# Patient Record
Sex: MALE | Race: BLACK OR AFRICAN AMERICAN | NOT HISPANIC OR LATINO | URBAN - METROPOLITAN AREA
[De-identification: names, ages, dates, MRNs, and addresses within clinical notes are randomized per-mention and may not be internally consistent; named-entity substitution may affect disease eponyms.]

---

## 2017-01-04 ENCOUNTER — EMERGENCY (EMERGENCY)
Facility: HOSPITAL | Age: 77
LOS: 1 days | Discharge: HOME | End: 2017-01-04
Attending: EMERGENCY MEDICINE | Admitting: STUDENT IN AN ORGANIZED HEALTH CARE EDUCATION/TRAINING PROGRAM
Payer: COMMERCIAL

## 2017-01-04 VITALS
HEART RATE: 93 BPM | SYSTOLIC BLOOD PRESSURE: 101 MMHG | DIASTOLIC BLOOD PRESSURE: 66 MMHG | OXYGEN SATURATION: 97 % | RESPIRATION RATE: 16 BRPM

## 2017-01-04 VITALS
RESPIRATION RATE: 14 BRPM | WEIGHT: 203.93 LBS | TEMPERATURE: 97 F | SYSTOLIC BLOOD PRESSURE: 108 MMHG | OXYGEN SATURATION: 99 % | DIASTOLIC BLOOD PRESSURE: 74 MMHG | HEART RATE: 102 BPM

## 2017-01-04 DIAGNOSIS — J44.9 CHRONIC OBSTRUCTIVE PULMONARY DISEASE, UNSPECIFIED: ICD-10-CM

## 2017-01-04 DIAGNOSIS — E11.9 TYPE 2 DIABETES MELLITUS WITHOUT COMPLICATIONS: ICD-10-CM

## 2017-01-04 DIAGNOSIS — R53.1 WEAKNESS: ICD-10-CM

## 2017-01-04 DIAGNOSIS — I73.9 PERIPHERAL VASCULAR DISEASE, UNSPECIFIED: ICD-10-CM

## 2017-01-04 DIAGNOSIS — Z87.891 PERSONAL HISTORY OF NICOTINE DEPENDENCE: ICD-10-CM

## 2017-01-04 DIAGNOSIS — I10 ESSENTIAL (PRIMARY) HYPERTENSION: ICD-10-CM

## 2017-01-04 DIAGNOSIS — Z79.84 LONG TERM (CURRENT) USE OF ORAL HYPOGLYCEMIC DRUGS: ICD-10-CM

## 2017-01-04 DIAGNOSIS — Z95.1 PRESENCE OF AORTOCORONARY BYPASS GRAFT: ICD-10-CM

## 2017-01-04 DIAGNOSIS — R05 COUGH: ICD-10-CM

## 2017-01-04 DIAGNOSIS — E78.5 HYPERLIPIDEMIA, UNSPECIFIED: ICD-10-CM

## 2017-01-04 DIAGNOSIS — I25.10 ATHEROSCLEROTIC HEART DISEASE OF NATIVE CORONARY ARTERY WITHOUT ANGINA PECTORIS: ICD-10-CM

## 2017-01-04 LAB
ALBUMIN SERPL ELPH-MCNC: 3.1 G/DL — LOW (ref 3.3–5)
ALP SERPL-CCNC: 95 U/L — SIGNIFICANT CHANGE UP (ref 40–120)
ALT FLD-CCNC: 22 U/L — SIGNIFICANT CHANGE UP (ref 12–78)
ANION GAP SERPL CALC-SCNC: 11 MMOL/L — SIGNIFICANT CHANGE UP (ref 5–17)
AST SERPL-CCNC: 12 U/L — LOW (ref 15–37)
BASOPHILS # BLD AUTO: 0.1 K/UL — SIGNIFICANT CHANGE UP (ref 0–0.2)
BASOPHILS NFR BLD AUTO: 1.5 % — SIGNIFICANT CHANGE UP (ref 0–2)
BILIRUB SERPL-MCNC: 0.4 MG/DL — SIGNIFICANT CHANGE UP (ref 0.2–1.2)
BUN SERPL-MCNC: 17 MG/DL — SIGNIFICANT CHANGE UP (ref 7–23)
CALCIUM SERPL-MCNC: 10 MG/DL — SIGNIFICANT CHANGE UP (ref 8.5–10.1)
CHLORIDE SERPL-SCNC: 97 MMOL/L — SIGNIFICANT CHANGE UP (ref 96–108)
CO2 SERPL-SCNC: 29 MMOL/L — SIGNIFICANT CHANGE UP (ref 22–31)
CREAT SERPL-MCNC: 1.4 MG/DL — HIGH (ref 0.5–1.3)
EOSINOPHIL # BLD AUTO: 0.2 K/UL — SIGNIFICANT CHANGE UP (ref 0–0.5)
EOSINOPHIL NFR BLD AUTO: 2.3 % — SIGNIFICANT CHANGE UP (ref 0–6)
GLUCOSE SERPL-MCNC: 360 MG/DL — HIGH (ref 70–99)
HCT VFR BLD CALC: 43.1 % — SIGNIFICANT CHANGE UP (ref 39–50)
HGB BLD-MCNC: 13.7 G/DL — SIGNIFICANT CHANGE UP (ref 13–17)
LYMPHOCYTES # BLD AUTO: 1.6 K/UL — SIGNIFICANT CHANGE UP (ref 1–3.3)
LYMPHOCYTES # BLD AUTO: 25.2 % — SIGNIFICANT CHANGE UP (ref 13–44)
MCHC RBC-ENTMCNC: 27.1 PG — SIGNIFICANT CHANGE UP (ref 27–34)
MCHC RBC-ENTMCNC: 31.8 GM/DL — LOW (ref 32–36)
MCV RBC AUTO: 85.2 FL — SIGNIFICANT CHANGE UP (ref 80–100)
MONOCYTES # BLD AUTO: 0.6 K/UL — SIGNIFICANT CHANGE UP (ref 0–0.9)
MONOCYTES NFR BLD AUTO: 9.3 % — HIGH (ref 1–9)
NEUTROPHILS # BLD AUTO: 4 K/UL — SIGNIFICANT CHANGE UP (ref 1.8–7.4)
NEUTROPHILS NFR BLD AUTO: 61.7 % — SIGNIFICANT CHANGE UP (ref 43–77)
PLATELET # BLD AUTO: 382 K/UL — SIGNIFICANT CHANGE UP (ref 150–400)
POTASSIUM SERPL-MCNC: 4.3 MMOL/L — SIGNIFICANT CHANGE UP (ref 3.5–5.3)
POTASSIUM SERPL-SCNC: 4.3 MMOL/L — SIGNIFICANT CHANGE UP (ref 3.5–5.3)
PROT SERPL-MCNC: 7.5 G/DL — SIGNIFICANT CHANGE UP (ref 6–8.3)
RBC # BLD: 5.06 M/UL — SIGNIFICANT CHANGE UP (ref 4.2–5.8)
RBC # FLD: 13.1 % — SIGNIFICANT CHANGE UP (ref 10.3–14.5)
SODIUM SERPL-SCNC: 137 MMOL/L — SIGNIFICANT CHANGE UP (ref 135–145)
WBC # BLD: 6.5 K/UL — SIGNIFICANT CHANGE UP (ref 3.8–10.5)
WBC # FLD AUTO: 6.5 K/UL — SIGNIFICANT CHANGE UP (ref 3.8–10.5)

## 2017-01-04 PROCEDURE — 93005 ELECTROCARDIOGRAM TRACING: CPT

## 2017-01-04 PROCEDURE — 99283 EMERGENCY DEPT VISIT LOW MDM: CPT | Mod: 25

## 2017-01-04 PROCEDURE — 71045 X-RAY EXAM CHEST 1 VIEW: CPT

## 2017-01-04 PROCEDURE — 71010: CPT | Mod: 26

## 2017-01-04 PROCEDURE — 80053 COMPREHEN METABOLIC PANEL: CPT

## 2017-01-04 PROCEDURE — 99285 EMERGENCY DEPT VISIT HI MDM: CPT

## 2017-01-04 PROCEDURE — 85027 COMPLETE CBC AUTOMATED: CPT

## 2017-01-04 RX ORDER — SODIUM CHLORIDE 9 MG/ML
1000 INJECTION INTRAMUSCULAR; INTRAVENOUS; SUBCUTANEOUS ONCE
Qty: 0 | Refills: 0 | Status: COMPLETED | OUTPATIENT
Start: 2017-01-04 | End: 2017-01-04

## 2017-01-04 RX ADMIN — SODIUM CHLORIDE 1000 MILLILITER(S): 9 INJECTION INTRAMUSCULAR; INTRAVENOUS; SUBCUTANEOUS at 14:53

## 2017-01-04 NOTE — ED PROVIDER NOTE - OBJECTIVE STATEMENT
pt c/o generalized weakness/fatigue. pt had recent pna, d/c from hospital 12/27. pt reports was coughing x 3 weeks. no fevers, chills, ha, d/n/v, cp, sob, abd pain.  pmd - hip

## 2017-01-04 NOTE — ED PROVIDER NOTE - PROGRESS NOTE DETAILS
CXR d/w radiologist, he relates infiltrate seen on CT chest 12/27. Reevaluated patient at bedside.  Patient feeling much improved, ambulating in ER without symptoms. Pt offered admission, but declining, has 1 more day abx, wants to be d/c to f/u as outpt.  Discussed the results of all diagnostic testing in ED and copies of all reports given.   An opportunity to ask questions was given.  Discussed the importance of prompt, close medical follow-up.  Patient will return with any changes, concerns or persistent / worsening symptoms.  Understanding of all instructions verbalized.

## 2017-01-04 NOTE — ED PROVIDER NOTE - PMH
Asymptomatic Peripheral Vascular Disease    CAD (Coronary Artery Disease)    COPD (chronic obstructive pulmonary disease)    Diabetes Mellitus, Type II    Dyslipidemia    Hypertension    Osteomyelitis

## 2017-01-04 NOTE — ED PROVIDER NOTE - CONDUCTED A DETAILED DISCUSSION WITH PATIENT AND/OR GUARDIAN REGARDING, MDM
return to ED if symptoms worsen, persist or questions arise/need for outpatient follow-up/radiology results/lab results

## 2017-01-04 NOTE — ED ADULT NURSE NOTE - OBJECTIVE STATEMENT
Pt to ED sent by PMD for hypotension, pt states being sick with cough/cough x 3weeks, afebrile on arrival, skin intact, EKG performed, lab tests sent will continue to monitor.

## 2017-07-06 ENCOUNTER — INPATIENT (INPATIENT)
Facility: HOSPITAL | Age: 77
LOS: 3 days | Discharge: HOME | DRG: 189 | End: 2017-07-10
Attending: INTERNAL MEDICINE | Admitting: INTERNAL MEDICINE
Payer: COMMERCIAL

## 2017-07-06 VITALS
OXYGEN SATURATION: 100 % | SYSTOLIC BLOOD PRESSURE: 143 MMHG | HEART RATE: 120 BPM | RESPIRATION RATE: 36 BRPM | DIASTOLIC BLOOD PRESSURE: 91 MMHG | TEMPERATURE: 98 F

## 2017-07-06 DIAGNOSIS — J96.91 RESPIRATORY FAILURE, UNSPECIFIED WITH HYPOXIA: ICD-10-CM

## 2017-07-06 LAB
ALBUMIN SERPL ELPH-MCNC: 3.8 G/DL — SIGNIFICANT CHANGE UP (ref 3.3–5)
ALP SERPL-CCNC: 98 U/L — SIGNIFICANT CHANGE UP (ref 40–120)
ALT FLD-CCNC: 29 U/L — SIGNIFICANT CHANGE UP (ref 12–78)
ANION GAP SERPL CALC-SCNC: 8 MMOL/L — SIGNIFICANT CHANGE UP (ref 5–17)
AST SERPL-CCNC: 14 U/L — LOW (ref 15–37)
BASOPHILS # BLD AUTO: 0 K/UL — SIGNIFICANT CHANGE UP (ref 0–0.2)
BASOPHILS NFR BLD AUTO: 0.4 % — SIGNIFICANT CHANGE UP (ref 0–2)
BILIRUB SERPL-MCNC: 0.3 MG/DL — SIGNIFICANT CHANGE UP (ref 0.2–1.2)
BUN SERPL-MCNC: 28 MG/DL — HIGH (ref 7–23)
CALCIUM SERPL-MCNC: 9.5 MG/DL — SIGNIFICANT CHANGE UP (ref 8.5–10.1)
CHLORIDE SERPL-SCNC: 93 MMOL/L — LOW (ref 96–108)
CK MB BLD-MCNC: 6.8 % — HIGH (ref 0–3.5)
CK MB CFR SERPL CALC: 10.7 NG/ML — HIGH (ref 0–3.6)
CK SERPL-CCNC: 158 U/L — SIGNIFICANT CHANGE UP (ref 26–308)
CO2 SERPL-SCNC: 32 MMOL/L — HIGH (ref 22–31)
CREAT SERPL-MCNC: 1.6 MG/DL — HIGH (ref 0.5–1.3)
EOSINOPHIL # BLD AUTO: 0 K/UL — SIGNIFICANT CHANGE UP (ref 0–0.5)
EOSINOPHIL NFR BLD AUTO: 0.1 % — SIGNIFICANT CHANGE UP (ref 0–6)
GLUCOSE SERPL-MCNC: 372 MG/DL — HIGH (ref 70–99)
HCT VFR BLD CALC: 44.6 % — SIGNIFICANT CHANGE UP (ref 39–50)
HGB BLD-MCNC: 14.3 G/DL — SIGNIFICANT CHANGE UP (ref 13–17)
LACTATE SERPL-SCNC: 2.4 MMOL/L — HIGH (ref 0.7–2)
LYMPHOCYTES # BLD AUTO: 0.5 K/UL — LOW (ref 1–3.3)
LYMPHOCYTES # BLD AUTO: 5.4 % — LOW (ref 13–44)
MCHC RBC-ENTMCNC: 28.4 PG — SIGNIFICANT CHANGE UP (ref 27–34)
MCHC RBC-ENTMCNC: 32 GM/DL — SIGNIFICANT CHANGE UP (ref 32–36)
MCV RBC AUTO: 88.8 FL — SIGNIFICANT CHANGE UP (ref 80–100)
MONOCYTES # BLD AUTO: 0.1 K/UL — SIGNIFICANT CHANGE UP (ref 0–0.9)
MONOCYTES NFR BLD AUTO: 0.9 % — LOW (ref 1–9)
NEUTROPHILS # BLD AUTO: 8.1 K/UL — HIGH (ref 1.8–7.4)
NEUTROPHILS NFR BLD AUTO: 93.2 % — HIGH (ref 43–77)
NT-PROBNP SERPL-SCNC: 168 PG/ML — SIGNIFICANT CHANGE UP (ref 0–450)
PLATELET # BLD AUTO: 264 K/UL — SIGNIFICANT CHANGE UP (ref 150–400)
POTASSIUM SERPL-MCNC: 5.2 MMOL/L — SIGNIFICANT CHANGE UP (ref 3.5–5.3)
POTASSIUM SERPL-SCNC: 5.2 MMOL/L — SIGNIFICANT CHANGE UP (ref 3.5–5.3)
PROT SERPL-MCNC: 7.3 G/DL — SIGNIFICANT CHANGE UP (ref 6–8.3)
RBC # BLD: 5.03 M/UL — SIGNIFICANT CHANGE UP (ref 4.2–5.8)
RBC # FLD: 13.6 % — SIGNIFICANT CHANGE UP (ref 10.3–14.5)
SODIUM SERPL-SCNC: 133 MMOL/L — LOW (ref 135–145)
TROPONIN I SERPL-MCNC: <.015 NG/ML — SIGNIFICANT CHANGE UP (ref 0.01–0.04)
WBC # BLD: 8.7 K/UL — SIGNIFICANT CHANGE UP (ref 3.8–10.5)
WBC # FLD AUTO: 8.7 K/UL — SIGNIFICANT CHANGE UP (ref 3.8–10.5)

## 2017-07-06 PROCEDURE — 93010 ELECTROCARDIOGRAM REPORT: CPT

## 2017-07-06 PROCEDURE — 99223 1ST HOSP IP/OBS HIGH 75: CPT | Mod: AI

## 2017-07-06 PROCEDURE — 71010: CPT | Mod: 26

## 2017-07-06 PROCEDURE — 99291 CRITICAL CARE FIRST HOUR: CPT

## 2017-07-06 PROCEDURE — 99285 EMERGENCY DEPT VISIT HI MDM: CPT

## 2017-07-06 RX ORDER — CLOPIDOGREL BISULFATE 75 MG/1
75 TABLET, FILM COATED ORAL DAILY
Qty: 0 | Refills: 0 | Status: DISCONTINUED | OUTPATIENT
Start: 2017-07-06 | End: 2017-07-10

## 2017-07-06 RX ORDER — INSULIN LISPRO 100/ML
6 VIAL (ML) SUBCUTANEOUS ONCE
Qty: 0 | Refills: 0 | Status: COMPLETED | OUTPATIENT
Start: 2017-07-06 | End: 2017-07-06

## 2017-07-06 RX ORDER — ATORVASTATIN CALCIUM 80 MG/1
40 TABLET, FILM COATED ORAL AT BEDTIME
Qty: 0 | Refills: 0 | Status: DISCONTINUED | OUTPATIENT
Start: 2017-07-06 | End: 2017-07-10

## 2017-07-06 RX ORDER — FUROSEMIDE 40 MG
40 TABLET ORAL ONCE
Qty: 0 | Refills: 0 | Status: DISCONTINUED | OUTPATIENT
Start: 2017-07-06 | End: 2017-07-06

## 2017-07-06 RX ORDER — DEXTROSE 50 % IN WATER 50 %
1 SYRINGE (ML) INTRAVENOUS ONCE
Qty: 0 | Refills: 0 | Status: DISCONTINUED | OUTPATIENT
Start: 2017-07-06 | End: 2017-07-10

## 2017-07-06 RX ORDER — INSULIN GLARGINE 100 [IU]/ML
12 INJECTION, SOLUTION SUBCUTANEOUS AT BEDTIME
Qty: 0 | Refills: 0 | Status: DISCONTINUED | OUTPATIENT
Start: 2017-07-06 | End: 2017-07-10

## 2017-07-06 RX ORDER — SODIUM CHLORIDE 9 MG/ML
1000 INJECTION, SOLUTION INTRAVENOUS
Qty: 0 | Refills: 0 | Status: DISCONTINUED | OUTPATIENT
Start: 2017-07-06 | End: 2017-07-10

## 2017-07-06 RX ORDER — INSULIN LISPRO 100/ML
VIAL (ML) SUBCUTANEOUS
Qty: 0 | Refills: 0 | Status: DISCONTINUED | OUTPATIENT
Start: 2017-07-06 | End: 2017-07-10

## 2017-07-06 RX ORDER — IPRATROPIUM/ALBUTEROL SULFATE 18-103MCG
3 AEROSOL WITH ADAPTER (GRAM) INHALATION ONCE
Qty: 0 | Refills: 0 | Status: COMPLETED | OUTPATIENT
Start: 2017-07-06 | End: 2017-07-06

## 2017-07-06 RX ORDER — BUDESONIDE AND FORMOTEROL FUMARATE DIHYDRATE 160; 4.5 UG/1; UG/1
2 AEROSOL RESPIRATORY (INHALATION)
Qty: 0 | Refills: 0 | Status: DISCONTINUED | OUTPATIENT
Start: 2017-07-06 | End: 2017-07-10

## 2017-07-06 RX ORDER — CEFTRIAXONE 500 MG/1
1 INJECTION, POWDER, FOR SOLUTION INTRAMUSCULAR; INTRAVENOUS EVERY 24 HOURS
Qty: 0 | Refills: 0 | Status: DISCONTINUED | OUTPATIENT
Start: 2017-07-06 | End: 2017-07-09

## 2017-07-06 RX ORDER — IPRATROPIUM/ALBUTEROL SULFATE 18-103MCG
3 AEROSOL WITH ADAPTER (GRAM) INHALATION EVERY 6 HOURS
Qty: 0 | Refills: 0 | Status: DISCONTINUED | OUTPATIENT
Start: 2017-07-06 | End: 2017-07-08

## 2017-07-06 RX ORDER — DEXTROSE 50 % IN WATER 50 %
25 SYRINGE (ML) INTRAVENOUS ONCE
Qty: 0 | Refills: 0 | Status: DISCONTINUED | OUTPATIENT
Start: 2017-07-06 | End: 2017-07-10

## 2017-07-06 RX ORDER — VALSARTAN 80 MG/1
160 TABLET ORAL DAILY
Qty: 0 | Refills: 0 | Status: DISCONTINUED | OUTPATIENT
Start: 2017-07-06 | End: 2017-07-10

## 2017-07-06 RX ORDER — DEXTROSE 50 % IN WATER 50 %
12.5 SYRINGE (ML) INTRAVENOUS ONCE
Qty: 0 | Refills: 0 | Status: DISCONTINUED | OUTPATIENT
Start: 2017-07-06 | End: 2017-07-10

## 2017-07-06 RX ORDER — INSULIN LISPRO 100/ML
4 VIAL (ML) SUBCUTANEOUS
Qty: 0 | Refills: 0 | Status: DISCONTINUED | OUTPATIENT
Start: 2017-07-06 | End: 2017-07-07

## 2017-07-06 RX ORDER — GLUCAGON INJECTION, SOLUTION 0.5 MG/.1ML
1 INJECTION, SOLUTION SUBCUTANEOUS ONCE
Qty: 0 | Refills: 0 | Status: DISCONTINUED | OUTPATIENT
Start: 2017-07-06 | End: 2017-07-10

## 2017-07-06 RX ORDER — MAGNESIUM SULFATE 500 MG/ML
2 VIAL (ML) INJECTION ONCE
Qty: 0 | Refills: 0 | Status: COMPLETED | OUTPATIENT
Start: 2017-07-06 | End: 2017-07-06

## 2017-07-06 RX ORDER — ENOXAPARIN SODIUM 100 MG/ML
40 INJECTION SUBCUTANEOUS DAILY
Qty: 0 | Refills: 0 | Status: DISCONTINUED | OUTPATIENT
Start: 2017-07-06 | End: 2017-07-10

## 2017-07-06 RX ORDER — AZITHROMYCIN 500 MG/1
500 TABLET, FILM COATED ORAL EVERY 24 HOURS
Qty: 0 | Refills: 0 | Status: DISCONTINUED | OUTPATIENT
Start: 2017-07-06 | End: 2017-07-10

## 2017-07-06 RX ORDER — TIOTROPIUM BROMIDE 18 UG/1
1 CAPSULE ORAL; RESPIRATORY (INHALATION) DAILY
Qty: 0 | Refills: 0 | Status: DISCONTINUED | OUTPATIENT
Start: 2017-07-06 | End: 2017-07-10

## 2017-07-06 RX ORDER — TAMSULOSIN HYDROCHLORIDE 0.4 MG/1
0.4 CAPSULE ORAL AT BEDTIME
Qty: 0 | Refills: 0 | Status: DISCONTINUED | OUTPATIENT
Start: 2017-07-06 | End: 2017-07-10

## 2017-07-06 RX ADMIN — INSULIN GLARGINE 12 UNIT(S): 100 INJECTION, SOLUTION SUBCUTANEOUS at 21:24

## 2017-07-06 RX ADMIN — Medication 50 GRAM(S): at 08:17

## 2017-07-06 RX ADMIN — ATORVASTATIN CALCIUM 40 MILLIGRAM(S): 80 TABLET, FILM COATED ORAL at 21:24

## 2017-07-06 RX ADMIN — ENOXAPARIN SODIUM 40 MILLIGRAM(S): 100 INJECTION SUBCUTANEOUS at 21:24

## 2017-07-06 RX ADMIN — Medication 40 MILLIGRAM(S): at 18:00

## 2017-07-06 RX ADMIN — Medication 3 MILLILITER(S): at 08:18

## 2017-07-06 RX ADMIN — BUDESONIDE AND FORMOTEROL FUMARATE DIHYDRATE 2 PUFF(S): 160; 4.5 AEROSOL RESPIRATORY (INHALATION) at 17:24

## 2017-07-06 RX ADMIN — Medication 3 MILLILITER(S): at 20:26

## 2017-07-06 RX ADMIN — TAMSULOSIN HYDROCHLORIDE 0.4 MILLIGRAM(S): 0.4 CAPSULE ORAL at 21:24

## 2017-07-06 RX ADMIN — Medication 125 MILLIGRAM(S): at 08:17

## 2017-07-06 RX ADMIN — Medication 6 UNIT(S): at 21:32

## 2017-07-06 NOTE — ED PROVIDER NOTE - OBJECTIVE STATEMENT
pt w COPD, progressive SOB over the last two weeks while he was being tapered off of prednisone.  now moderate to severe and not responsive to home nebulizer w albuterol.  endorses dry cough, no fevers, no chest pain, no abd pain or n/v/d.

## 2017-07-06 NOTE — CONSULT NOTE ADULT - SUBJECTIVE AND OBJECTIVE BOX
CC: 77M presents with progressively worsening dyspnea and cough    HPI:  77M PMH COPD (previously on Symbicort), HTN, HLD, DM2 (not on insulin), CAD s/p 3V-CABG (2004), PVD s/p R LE stent on plavix, BPH, and left femur fracture with OM s/p multiple surgeries who presents with 1 month of progressively worsening dyspnea on exertion. He reports exercise tolerance of less than 1 block, 2-pillow orthopnea, waking up frequently at night due to dyspnea. He denies any leg edema or chest pain. No fevers or chills, but reports worsening cough without production. He saw a PMD who prescribed him a steroid taper that he completed 2 days ago. He has also been taking albuterol nebs q6h for 1 month without significant improvement.     In the ED, he was afebrile and hemodynamically stable, but lethargic and found on ABG to have acute on chronic hypercapnic respiratory failure. He was placed on BiPAP 15/5 and first repeat ABG showed no improvement in hypercapnia. He was subsequently admitted to ICU. He received Solumedrol and duonebs with improvement in dyspnea.    Allergies: No Known Allergies    PAST MEDICAL & SURGICAL HISTORY:  COPD (previously on Symbicort)  Hypertension  Hyperlipidemia  Diabetes mellitus type 2 (not on insulin)  Coronary artery disease  S/P 3V-CABG (2004)  Left femur fracture and osteomyelitis s/p multiple orthopedic surgeries  Benign prostatic hyperplasia  Peripheral vascular disease s/p Right leg stent    FAMILY HISTORY:  Family history of diabetes mellitus    SOCIAL HISTORY:  quit smoking 30 years ago, used to smoke 1 ppd for 35 years. Smokes about 3-4 joints marijuana 3x/week. No alcohol use. No other drug use    Home Medications: Clopidogrel 75 mg qd, Valsartan/HCTZ 160/12.5 qd, Metformin 1000 mg bid, Glipizide 2.5 mg qd, Tamsulosin 0.4 mg qhs, Atorvastatin 40 mg qhs, Albuterol nebs    Review of Systems:  Constitutional: +fatigue, lethargy, no fevers or chills  Neuro: no headache, numbness, weakness  Resp: +dyspnea, +cough  CVS: no chest pain, palpitations, leg swelling  GI: no abdominal pain, nausea, vomiting, diarrhea   : no dysuria, frequency, incontinence  Skin: no itching, burning, rashes, or lesions   Msk: no joint pain or swelling  Psych: no depression, anxiety, mood swings    T(F): 98.5 (07-06-17 @ 07:14), Max: 98.5 (07-06-17 @ 07:14)  HR: 92 (07-06-17 @ 15:30) (90 - 120)  BP: 99/53 (07-06-17 @ 09:44) (99/53 - 143/91)  RR: 16 (07-06-17 @ 09:44) (16 - 36)  SpO2: 98% (07-06-17 @ 15:30)    Physical Exam:     Gen: NAD; AAOx3, lethargic in speech  Neuro: CN II-XII grossly intact; moving all extremities  HEENT: NC/AT; EOMI; injected sclera, MMM; no JVD  CVS: normal S1 & S2; regular rate and rhythm   Resp: decreased air entry bilaterally; faint end-expiratory wheezing  Abd: soft; NT/ND   Ext: no edema; no cyanosis or clubbing  Skin: WWP                        15.3   10.4  )-----------( 270      ( 06 Jul 2017 08:13 )             47.1     137  |  98  |  24  ----------------------------<  252  4.6   |  33  |  1.40    Ca    9.8      06 Jul 2017 08:14    TPro  7.3  /  Alb  3.9  /  TBili  0.3  /  DBili  x   /  AST  19  /  ALT  30  /  AlkPhos  106  07-06    Lactate 1.0           07-06 @ 08:14      CARDIAC MARKERS ( 06 Jul 2017 08:14 )  <.015 ng/mL / x     / 181 U/L / x     / 11.1 ng/mL    CXR: hyperinflated lungs with flattened hemidiaphragm. Stable left basilar opacity    CODE STATUS: full

## 2017-07-06 NOTE — DOWNTIME INTERRUPTION NOTE - WHICH MANUAL FORMS INITIATED?
Down time interruption today.    I/O  VS  MAR  Physician orders.  Critical lab value  Patient profile  assesment profile (body system)  Lab results

## 2017-07-06 NOTE — ED ADULT NURSE REASSESSMENT NOTE - NS ED NURSE REASSESS COMMENT FT1
pt placed on bipap due to abg results, 40% FIO2, , 15 ipap, 5epap
after 3 Duonebs pt is now wheezing, able to hear breath sounds. placed on an end tidal CO @ 35. will continue to monitor

## 2017-07-06 NOTE — PROGRESS NOTE ADULT - SUBJECTIVE AND OBJECTIVE BOX
Patient is a 77y old  Male who presents with a chief complaint of   PAST MEDICAL & SURGICAL HISTORY:  Hypertension  COPD (chronic obstructive pulmonary disease)  Osteomyelitis  Asymptomatic Peripheral Vascular Disease  Dyslipidemia  CAD (Coronary Artery Disease)  Diabetes Mellitus, Type II  CABG (Coronary Artery Bypass Graft)    YUE COMMODORE   77y    Male    BRIEF HOSPITAL COURSE:    Review of Systems:          SOB improved             All other ROS are negative.    ICU Vital Signs Last 24 Hrs  T(C): 36.9 (2017 23:40), Max: 36.9 (2017 07:14)  T(F): 98.4 (2017 23:40), Max: 98.5 (2017 07:14)  HR: 96 (2017 23:00) (88 - 120)  BP: 128/66 (2017 23:00) (99/53 - 143/91)  BP(mean): 90 (2017 23:00) (80 - 90)  ABP: --  ABP(mean): --  RR: 20 (2017 23:00) (14 - 36)  SpO2: 98% (2017 23:00) (96% - 100%)    Physical Examination:    General:  NAD    HEENT: no JVD    PULM:  bilateral bs few wheeze    CVS: s1 s2 reg    ABD: Soft      EXT: no edema     SKIN: warm    Neuro: alert awake.     ABG - ( 2017 14:19 )  pH: 7.30  /  pCO2: 61    /  pO2: 91    / HCO3: 26    / Base Excess: 2.9   /  SaO2: 96        LABS:                        14.3   8.7   )-----------( 264      ( 2017 14:40 )             44.6     07-06    133<L>  |  93<L>  |  28<H>  ----------------------------<  372<H>  5.2   |  32<H>  |  1.60<H>    Ca    9.5      2017 14:40  Phos  4.3     07-06  Mg     2.1     07-06    TPro  7.3  /  Alb  3.8  /  TBili  0.3  /  DBili  <.10  /  AST  14<L>  /  ALT  29  /  AlkPhos  98  07-06      CARDIAC MARKERS ( 2017 14:40 )  <.015 ng/mL / x     / 158 U/L / x     / 10.7 ng/mL  CARDIAC MARKERS ( 2017 08:14 )  <.015 ng/mL / x     / 181 U/L / x     / 11.1 ng/mL    CAPILLARY BLOOD GLUCOSE  366 (2017 21:14)    PT/INR - ( 2017 15:50 )   PT: 10.6 sec;   INR: 0.97 ratio         PTT - ( 2017 15:50 )  PTT:36.7 sec  Urinalysis Basic - ( 2017 16:39 )    Color: Yellow / Appearance: Clear / S.020 / pH: x  Gluc: x / Ketone: Negative  / Bili: Negative / Urobili: Negative   Blood: x / Protein: 25 mg/dL / Nitrite: Negative   Leuk Esterase: Negative / RBC: 0-2 /HPF / WBC 0-2   Sq Epi: x / Non Sq Epi: x / Bacteria: x      Medications:  cefTRIAXone   IVPB 1 Gram(s) IV Intermittent every 24 hours  azithromycin  IVPB 500 milliGRAM(s) IV Intermittent every 24 hours  tamsulosin 0.4 milliGRAM(s) Oral at bedtime  valsartan 160 milliGRAM(s) Oral daily  ALBUTerol/ipratropium for Nebulization 3 milliLiter(s) Nebulizer every 6 hours  buDESOnide 160 MICROgram(s)/formoterol 4.5 MICROgram(s) Inhaler 2 Puff(s) Inhalation two times a day  tiotropium 18 MICROgram(s) Capsule 1 Capsule(s) Inhalation daily  enoxaparin Injectable 40 milliGRAM(s) SubCutaneous daily  clopidogrel Tablet 75 milliGRAM(s) Oral daily  insulin glargine Injectable (LANTUS) 12 Unit(s) SubCutaneous at bedtime  insulin lispro Injectable (HumaLOG) 4 Unit(s) SubCutaneous three times a day before meals  insulin lispro (HumaLOG) corrective regimen sliding scale   SubCutaneous three times a day before meals  methylPREDNISolone sodium succinate Injectable 40 milliGRAM(s) IV Push two times a day  atorvastatin 40 milliGRAM(s) Oral at bedtime       @ 07:01  -   @ 00:19  --------------------------------------------------------  IN: 420 mL / OUT: 700 mL / NET: -280 mL    RADIOLOGY/IMAGING/ECHO    CXR hyperinflated.      Assessment/Plan:    77M PMH COPD HTN, HLD, DM2, CAD s/p 3V-CABG (), PVD s/p R LE stent on plavix, BPH, and left femur fracture presents with SOB lethargy.    In the ED, he was afebrile, but lethargic and found on ABG to have acute on chronic hypercapnic respiratory failure. He was placed on BiPAP 15/5 and first repeat ABG showed no improvement in hypercapnia. He was subsequently admitted to ICU. He received Solumedrol and duonebs with improvement in dyspnea.      AECOPD with acute on chronic hypercapnia.  Improved with therapy steroids bronchodilator  ceftriaxone/azithro. Now off BIPAP.     Lactic acidosis during resp distress no suspecting sepsis repeat in AM.    DVT prophylaxis   Glycemic control Lantus humalog     f/u cultures legionella ag.  d/c ceftriaxone in next 48 hrs if cultures negative.        Minutes of Critical Care time: 34  (Reviewing data, imaging, discussing with multidisciplinary team, non inclusive of procedures, discussing goals of care with patient/family)

## 2017-07-06 NOTE — CONSULT NOTE ADULT - ASSESSMENT
77M PMH COPD (previously on Symbicort), HTN, HLD, DM2 (not on insulin), CAD s/p 3V-CABG (2004), PVD s/p R LE stent on plavix, CKD, (baseline Cr=1.4), BPH, and left femur fracture with OM s/p multiple surgeries who presents with acute on chronic hypercapnic respiratory failure due to COPD exacerbation, improved with BiPAP    - Improved lethargy with improved ABG (now 7.3/60)  - HD stable, restart clopidogrel, valsartan, atorvastatin  - BiPAP 22/8 qhs and prn day, supplemental oxygen with NC@2-3LPM when off BiPAP, goal SpO2>88%  - Solumedrol 40 mg IV q12h + Duonebs q6h + Symbicort 160/4.5 2 puffs bid + Spiriva  - Ceftriaxone + Azithromycin for COPD exacerbation. There is a left basilar opacity. May obtain CT Chest in AM to re-eval  - Diabetic diet as tolerated  - CKD, trend BUN/Cr/K/HCO3, strict I/Os  - F/up blood cultures, UA, Urine cultures, urine legionella  - DVT ppx with Lovenox  - Start Lantus 12 units qhs + Lispro 4 tid ac + HISS  - No lines or cunha  - discussed with patient and wife, full code  CC time spent: 40 min

## 2017-07-07 DIAGNOSIS — J18.9 PNEUMONIA, UNSPECIFIED ORGANISM: ICD-10-CM

## 2017-07-07 DIAGNOSIS — E78.5 HYPERLIPIDEMIA, UNSPECIFIED: ICD-10-CM

## 2017-07-07 DIAGNOSIS — I25.10 ATHEROSCLEROTIC HEART DISEASE OF NATIVE CORONARY ARTERY WITHOUT ANGINA PECTORIS: ICD-10-CM

## 2017-07-07 DIAGNOSIS — I10 ESSENTIAL (PRIMARY) HYPERTENSION: ICD-10-CM

## 2017-07-07 DIAGNOSIS — E11.9 TYPE 2 DIABETES MELLITUS WITHOUT COMPLICATIONS: ICD-10-CM

## 2017-07-07 DIAGNOSIS — J44.1 CHRONIC OBSTRUCTIVE PULMONARY DISEASE WITH (ACUTE) EXACERBATION: ICD-10-CM

## 2017-07-07 DIAGNOSIS — J96.01 ACUTE RESPIRATORY FAILURE WITH HYPOXIA: ICD-10-CM

## 2017-07-07 LAB
ALBUMIN SERPL ELPH-MCNC: 3.3 G/DL — SIGNIFICANT CHANGE UP (ref 3.3–5)
ALP SERPL-CCNC: 83 U/L — SIGNIFICANT CHANGE UP (ref 40–120)
ALT FLD-CCNC: 19 U/L — SIGNIFICANT CHANGE UP (ref 12–78)
ANION GAP SERPL CALC-SCNC: 3 MMOL/L — LOW (ref 5–17)
AST SERPL-CCNC: 17 U/L — SIGNIFICANT CHANGE UP (ref 15–37)
BASE EXCESS BLDA CALC-SCNC: 6.8 MMOL/L — HIGH (ref -2–2)
BASOPHILS # BLD AUTO: 0.1 K/UL — SIGNIFICANT CHANGE UP (ref 0–0.2)
BASOPHILS NFR BLD AUTO: 0.6 % — SIGNIFICANT CHANGE UP (ref 0–2)
BILIRUB SERPL-MCNC: 0.2 MG/DL — SIGNIFICANT CHANGE UP (ref 0.2–1.2)
BLOOD GAS COMMENTS ARTERIAL: SIGNIFICANT CHANGE UP
BUN SERPL-MCNC: 30 MG/DL — HIGH (ref 7–23)
CALCIUM SERPL-MCNC: 9.3 MG/DL — SIGNIFICANT CHANGE UP (ref 8.5–10.1)
CHLORIDE SERPL-SCNC: 97 MMOL/L — SIGNIFICANT CHANGE UP (ref 96–108)
CO2 SERPL-SCNC: 37 MMOL/L — HIGH (ref 22–31)
CREAT SERPL-MCNC: 1.3 MG/DL — SIGNIFICANT CHANGE UP (ref 0.5–1.3)
EOSINOPHIL # BLD AUTO: 0 K/UL — SIGNIFICANT CHANGE UP (ref 0–0.5)
EOSINOPHIL NFR BLD AUTO: 0.1 % — SIGNIFICANT CHANGE UP (ref 0–6)
GLUCOSE SERPL-MCNC: 206 MG/DL — HIGH (ref 70–99)
HBA1C BLD-MCNC: 7.5 % — HIGH (ref 4–5.6)
HCO3 BLDA-SCNC: 29 MMOL/L — HIGH (ref 23–27)
HCT VFR BLD CALC: 42.4 % — SIGNIFICANT CHANGE UP (ref 39–50)
HGB BLD-MCNC: 13.6 G/DL — SIGNIFICANT CHANGE UP (ref 13–17)
HOROWITZ INDEX BLDA+IHG-RTO: 21 — SIGNIFICANT CHANGE UP
LACTATE SERPL-SCNC: 2.3 MMOL/L — HIGH (ref 0.7–2)
LYMPHOCYTES # BLD AUTO: 1.3 K/UL — SIGNIFICANT CHANGE UP (ref 1–3.3)
LYMPHOCYTES # BLD AUTO: 11.4 % — LOW (ref 13–44)
MAGNESIUM SERPL-MCNC: 2.3 MG/DL — SIGNIFICANT CHANGE UP (ref 1.6–2.6)
MCHC RBC-ENTMCNC: 28.4 PG — SIGNIFICANT CHANGE UP (ref 27–34)
MCHC RBC-ENTMCNC: 32 GM/DL — SIGNIFICANT CHANGE UP (ref 32–36)
MCV RBC AUTO: 88.5 FL — SIGNIFICANT CHANGE UP (ref 80–100)
MONOCYTES # BLD AUTO: 1.1 K/UL — HIGH (ref 0–0.9)
MONOCYTES NFR BLD AUTO: 9.9 % — HIGH (ref 1–9)
NEUTROPHILS # BLD AUTO: 8.7 K/UL — HIGH (ref 1.8–7.4)
NEUTROPHILS NFR BLD AUTO: 78 % — HIGH (ref 43–77)
PCO2 BLDA: 64 MMHG — HIGH (ref 32–46)
PH BLDA: 7.33 — LOW (ref 7.35–7.45)
PHOSPHATE SERPL-MCNC: 4.2 MG/DL — SIGNIFICANT CHANGE UP (ref 2.5–4.5)
PLATELET # BLD AUTO: 231 K/UL — SIGNIFICANT CHANGE UP (ref 150–400)
PO2 BLDA: 52 MMHG — LOW (ref 74–108)
POTASSIUM SERPL-MCNC: 5.3 MMOL/L — SIGNIFICANT CHANGE UP (ref 3.5–5.3)
POTASSIUM SERPL-SCNC: 5.3 MMOL/L — SIGNIFICANT CHANGE UP (ref 3.5–5.3)
PROT SERPL-MCNC: 6.6 G/DL — SIGNIFICANT CHANGE UP (ref 6–8.3)
RBC # BLD: 4.78 M/UL — SIGNIFICANT CHANGE UP (ref 4.2–5.8)
RBC # FLD: 14 % — SIGNIFICANT CHANGE UP (ref 10.3–14.5)
SAO2 % BLDA: 85 % — LOW (ref 92–96)
SODIUM SERPL-SCNC: 137 MMOL/L — SIGNIFICANT CHANGE UP (ref 135–145)
WBC # BLD: 11.2 K/UL — HIGH (ref 3.8–10.5)
WBC # FLD AUTO: 11.2 K/UL — HIGH (ref 3.8–10.5)

## 2017-07-07 PROCEDURE — 99233 SBSQ HOSP IP/OBS HIGH 50: CPT

## 2017-07-07 PROCEDURE — 99233 SBSQ HOSP IP/OBS HIGH 50: CPT | Mod: GC

## 2017-07-07 RX ORDER — INSULIN LISPRO 100/ML
6 VIAL (ML) SUBCUTANEOUS
Qty: 0 | Refills: 0 | Status: DISCONTINUED | OUTPATIENT
Start: 2017-07-07 | End: 2017-07-09

## 2017-07-07 RX ADMIN — Medication 4 UNIT(S): at 11:34

## 2017-07-07 RX ADMIN — Medication 40 MILLIGRAM(S): at 06:11

## 2017-07-07 RX ADMIN — TAMSULOSIN HYDROCHLORIDE 0.4 MILLIGRAM(S): 0.4 CAPSULE ORAL at 21:42

## 2017-07-07 RX ADMIN — Medication 3 MILLILITER(S): at 08:33

## 2017-07-07 RX ADMIN — Medication 5: at 11:35

## 2017-07-07 RX ADMIN — BUDESONIDE AND FORMOTEROL FUMARATE DIHYDRATE 2 PUFF(S): 160; 4.5 AEROSOL RESPIRATORY (INHALATION) at 06:11

## 2017-07-07 RX ADMIN — TIOTROPIUM BROMIDE 1 CAPSULE(S): 18 CAPSULE ORAL; RESPIRATORY (INHALATION) at 06:12

## 2017-07-07 RX ADMIN — Medication 4 UNIT(S): at 08:03

## 2017-07-07 RX ADMIN — ENOXAPARIN SODIUM 40 MILLIGRAM(S): 100 INJECTION SUBCUTANEOUS at 11:35

## 2017-07-07 RX ADMIN — CEFTRIAXONE 100 GRAM(S): 500 INJECTION, POWDER, FOR SOLUTION INTRAMUSCULAR; INTRAVENOUS at 09:57

## 2017-07-07 RX ADMIN — Medication 6 UNIT(S): at 17:41

## 2017-07-07 RX ADMIN — BUDESONIDE AND FORMOTEROL FUMARATE DIHYDRATE 2 PUFF(S): 160; 4.5 AEROSOL RESPIRATORY (INHALATION) at 18:17

## 2017-07-07 RX ADMIN — Medication 3 MILLILITER(S): at 19:42

## 2017-07-07 RX ADMIN — VALSARTAN 160 MILLIGRAM(S): 80 TABLET ORAL at 06:11

## 2017-07-07 RX ADMIN — Medication 3 MILLILITER(S): at 13:44

## 2017-07-07 RX ADMIN — AZITHROMYCIN 255 MILLIGRAM(S): 500 TABLET, FILM COATED ORAL at 11:22

## 2017-07-07 RX ADMIN — ATORVASTATIN CALCIUM 40 MILLIGRAM(S): 80 TABLET, FILM COATED ORAL at 21:41

## 2017-07-07 RX ADMIN — Medication 1: at 08:03

## 2017-07-07 RX ADMIN — Medication 3 MILLILITER(S): at 02:14

## 2017-07-07 RX ADMIN — INSULIN GLARGINE 12 UNIT(S): 100 INJECTION, SOLUTION SUBCUTANEOUS at 21:42

## 2017-07-07 RX ADMIN — CLOPIDOGREL BISULFATE 75 MILLIGRAM(S): 75 TABLET, FILM COATED ORAL at 11:35

## 2017-07-07 RX ADMIN — Medication 1: at 17:41

## 2017-07-07 NOTE — PROGRESS NOTE ADULT - SUBJECTIVE AND OBJECTIVE BOX
Patient is a 77y old  Male who presents with a chief complaint of  SOB    INTERVAL HPI/OVERNIGHT EVENTS: Patient on O2 Via NC now. Denies new complaints    MEDICATIONS  (STANDING):  insulin glargine Injectable (LANTUS) 12 Unit(s) SubCutaneous at bedtime  insulin lispro Injectable (HumaLOG) 4 Unit(s) SubCutaneous three times a day before meals  enoxaparin Injectable 40 milliGRAM(s) SubCutaneous daily  insulin lispro (HumaLOG) corrective regimen sliding scale   SubCutaneous three times a day before meals  dextrose 5%. 1000 milliLiter(s) (50 mL/Hr) IV Continuous <Continuous>  dextrose 50% Injectable 12.5 Gram(s) IV Push once  dextrose 50% Injectable 25 Gram(s) IV Push once  dextrose 50% Injectable 25 Gram(s) IV Push once  methylPREDNISolone sodium succinate Injectable 40 milliGRAM(s) IV Push two times a day  ALBUTerol/ipratropium for Nebulization 3 milliLiter(s) Nebulizer every 6 hours  buDESOnide 160 MICROgram(s)/formoterol 4.5 MICROgram(s) Inhaler 2 Puff(s) Inhalation two times a day  tiotropium 18 MICROgram(s) Capsule 1 Capsule(s) Inhalation daily  clopidogrel Tablet 75 milliGRAM(s) Oral daily  tamsulosin 0.4 milliGRAM(s) Oral at bedtime  atorvastatin 40 milliGRAM(s) Oral at bedtime  valsartan 160 milliGRAM(s) Oral daily  cefTRIAXone   IVPB 1 Gram(s) IV Intermittent every 24 hours  azithromycin  IVPB 500 milliGRAM(s) IV Intermittent every 24 hours    MEDICATIONS  (PRN):  dextrose Gel 1 Dose(s) Oral once PRN Blood Glucose LESS THAN 70 milliGRAM(s)/deciliter  glucagon  Injectable 1 milliGRAM(s) IntraMuscular once PRN Glucose LESS THAN 70 milligrams/deciliter      Allergies    No Known Allergies    Intolerances    shellfish (Nausea)      REVIEW OF SYSTEMS:  CONSTITUTIONAL: No fever or fatigue  EYES: No eye pain, visual disturbances, or discharge  ENMT:  No difficulty hearing, tinnitus, vertigo; No sinus or throat pain  NECK: No pain or stiffness  RESPIRATORY: No cough, wheezing, chills or hemoptysis; positive shortness of breath  CARDIOVASCULAR: No chest pain, palpitations, dizziness, or leg swelling  GASTROINTESTINAL: No abdominal or epigastric pain. No nausea, vomiting, or hematemesis; No diarrhea or constipation. No melena or hematochezia.  GENITOURINARY: No dysuria, frequency, hematuria, or incontinence  NEUROLOGICAL: No headaches, memory loss, loss of strength, numbness, or tremors  SKIN: No itching, burning, rashes, or lesions   LYMPH NODES: No enlarged glands  ENDOCRINE: No heat or cold intolerance; No hair loss; No polydipsia or polyuria  MUSCULOSKELETAL: No joint pain or swelling; No muscle, back, or extremity pain  PSYCHIATRIC: No depression, anxiety, mood swings, or difficulty sleeping  HEME/LYMPH: No easy bruising, or bleeding gums  ALLERGY AND IMMUNOLOGIC: No hives or eczema    Vital Signs Last 24 Hrs  T(C): 36.6 (07 Jul 2017 08:04), Max: 36.9 (06 Jul 2017 20:30)  T(F): 97.8 (07 Jul 2017 08:04), Max: 98.4 (06 Jul 2017 20:30)  HR: 118 (07 Jul 2017 08:34) (62 - 118)  BP: 115/58 (07 Jul 2017 08:00) (112/67 - 134/62)  BP(mean): 77 (07 Jul 2017 08:00) (75 - 90)  RR: 18 (07 Jul 2017 08:00) (12 - 24)  SpO2: 96% (07 Jul 2017 08:34) (88% - 100%)    PHYSICAL EXAM:  GENERAL: NAD, well-groomed, well-developed  HEAD:  Atraumatic, Normocephalic  EYES: EOMI, PERRLA, conjunctiva and sclera clear  ENMT: No tonsillar erythema, exudates, or enlargement; Moist mucous membranes, Good dentition, No lesions  NECK: Supple, No JVD, Normal thyroid  NERVOUS SYSTEM:  Alert & Oriented X3, Good concentration; Motor Strength 5/5 B/L upper and lower extremities; DTRs 2+ intact and symmetric  CHEST/LUNG: Decreased breath sounds  bilaterally; No rales, rhonchi, wheezing, or rubs  HEART: Regular rate and rhythm; No murmurs, rubs, or gallops  ABDOMEN: Soft, Nontender, Nondistended; Bowel sounds present  EXTREMITIES:  2+ Peripheral Pulses, No clubbing, cyanosis, or edema  LYMPH: No lymphadenopathy noted  SKIN: No rashes or lesions    LABS:                        13.6   11.2  )-----------( 231      ( 07 Jul 2017 05:59 )             42.4     07 Jul 2017 05:59    137    |  97     |  30     ----------------------------<  206    5.3     |  37     |  1.30     Ca    9.3        07 Jul 2017 05:59  Phos  4.2       07 Jul 2017 05:59  Mg     2.3       07 Jul 2017 05:59    TPro  6.6    /  Alb  3.3    /  TBili  0.2    /  DBili  x      /  AST  17     /  ALT  19     /  AlkPhos  83     07 Jul 2017 05:59    PT/INR - ( 06 Jul 2017 15:50 )   PT: 10.6 sec;   INR: 0.97 ratio         PTT - ( 06 Jul 2017 15:50 )  PTT:36.7 sec  CAPILLARY BLOOD GLUCOSE  167 (07 Jul 2017 07:00)  366 (06 Jul 2017 21:14)        BLOOD CULTURE    RADIOLOGY & ADDITIONAL TESTS:    Imaging Personally Reviewed:  [ ] YES     Consultant(s) Notes Reviewed:      Care Discussed with Consultants/Other Providers:

## 2017-07-07 NOTE — PROGRESS NOTE ADULT - SUBJECTIVE AND OBJECTIVE BOX
Interval events:   On nasal cannula overnight, patient reports he felt okay on nasal cannula.  Reports his breathing feels much better today than yesterday.  States cough is improving.  Reports he had a dull right sided chest pain this AM which resolved on it's own and did not return.     Review of Systems:  Constitutional: no fever, chills  Neuro: no headache, lightheadedness, dizziness.  Resp: + productive and dry cough, no wheezing, shortness of breath  CVS: no chest pain, palpitations, leg swelling  GI: no abdominal pain, nausea, vomiting, diarrhea   : no dysuria, frequency, incontinence  Skin: no itching, burning, rashes, or lesions   Msk: no joint pain or swelling      T(F): 97.5 (17 @ 12:00), Max: 98.4 (17 @ 20:30)  HR: 87 (17 @ 13:47) (62 - 118)  BP: 96/56 (17 @ 13:00) (95/57 - 134/62)  RR: 20 (17 @ 13:00) (12 - 24)  SpO2: 97% (17 @ 13:47) (88% - 99%)  Wt(kg): --      CAPILLARY BLOOD GLUCOSE  351 (2017 11:00)    I&O's Summary    2017 07:01  -  2017 07:00  --------------------------------------------------------  IN: 540 mL / OUT: 1200 mL / NET: -660 mL    2017 07:01  -  2017 14:04  --------------------------------------------------------  IN: 420 mL / OUT: 250 mL / NET: 170 mL        Physical Exam:     Gen: NAD, sitting up in chair  Neuro: AAO x 3  HEENT: NCAT, on nasal cannula  CV: RRR, +S1, +s2 no murmur  Pulm: Lungs CTA B/L, no w/r/r  GI: +BS x 4 Quadrants  Ext: No c/c/e      Meds:  enoxaparin Injectable 40 milliGRAM(s) SubCutaneous daily  clopidogrel Tablet 75 milliGRAM(s) Oral daily  cefTRIAXone   IVPB 1 Gram(s) IV Intermittent every 24 hours  azithromycin  IVPB 500 milliGRAM(s) IV Intermittent every 24 hours  tamsulosin 0.4 milliGRAM(s) Oral at bedtime  valsartan 160 milliGRAM(s) Oral daily  insulin glargine Injectable (LANTUS) 12 Unit(s) SubCutaneous at bedtime  insulin lispro Injectable (HumaLOG) 4 Unit(s) SubCutaneous three times a day before meals  insulin lispro (HumaLOG) corrective regimen sliding scale   SubCutaneous three times a day before meals  dextrose Gel 1 Dose(s) Oral once PRN  dextrose 50% Injectable 12.5 Gram(s) IV Push once  dextrose 50% Injectable 25 Gram(s) IV Push once  dextrose 50% Injectable 25 Gram(s) IV Push once  glucagon  Injectable 1 milliGRAM(s) IntraMuscular once PRN  methylPREDNISolone sodium succinate Injectable 40 milliGRAM(s) IV Push two times a day  atorvastatin 40 milliGRAM(s) Oral at bedtime  ALBUTerol/ipratropium for Nebulization 3 milliLiter(s) Nebulizer every 6 hours  buDESOnide 160 MICROgram(s)/formoterol 4.5 MICROgram(s) Inhaler 2 Puff(s) Inhalation two times a day  tiotropium 18 MICROgram(s) Capsule 1 Capsule(s) Inhalation daily  dextrose 5%. 1000 milliLiter(s) IV Continuous <Continuous>                            13.6   11.2  )-----------( 231      ( 2017 05:59 )             42.4       07-07    137  |  97  |  30<H>  ----------------------------<  206<H>  5.3   |  37<H>  |  1.30    Ca    9.3      2017 05:59  Phos  4.2     07-  Mg     2.3     -    TPro  6.6  /  Alb  3.3  /  TBili  0.2  /  DBili  x   /  AST  17  /  ALT  19  /  AlkPhos  83      Lactate 2.3           -07 @ 05:59    Lactate 2.4           - @ 14:48      CARDIAC MARKERS ( 2017 14:40 )  <.015 ng/mL / x     / 158 U/L / x     / 10.7 ng/mL  CARDIAC MARKERS ( 2017 08:14 )  <.015 ng/mL / x     / 181 U/L / x     / 11.1 ng/mL      PT/INR - ( 2017 15:50 )   PT: 10.6 sec;   INR: 0.97 ratio         PTT - ( 2017 15:50 )  PTT:36.7 sec  Urinalysis Basic - ( 2017 16:39 )    Color: Yellow / Appearance: Clear / S.020 / pH: x  Gluc: x / Ketone: Negative  / Bili: Negative / Urobili: Negative   Blood: x / Protein: 25 mg/dL / Nitrite: Negative   Leuk Esterase: Negative / RBC: 0-2 /HPF / WBC 0-2   Sq Epi: x / Non Sq Epi: x / Bacteria: x      ABG - ( 2017 09:35 )  pH: 7.33  /  pCO2: 64    /  pO2: 52    / HCO3: 29    / Base Excess: 6.8   /  SaO2: 85            Radiology: ***    Bedside Lung U/S: ***    Bedside Cardiac U/S: ***    CENTRAL LINE: Y/N   DATE INSERTED:   REMOVE: Y/N    SHARMA: Y/N      DATE INSERTED:        REMOVE: Y/N    A-LINE: Y/N     DATE INSERTED:              REMOVE: Y/N    GLOBAL ISSUE/BEST PRACTICE:  Analgesia: None  Sedation: None  HOB elevation: yes  Stress ulcer prophylaxis: None  VTE prophylaxis: Lovenox  Glycemic control: Lantus 12U QHS, 4U premeal and sliding scale  Nutrition: Consistent Carb/DASH-TLC    CODE STATUS: *** Interval events:   On nasal cannula overnight, patient reports he felt okay on nasal cannula.  Reports his breathing feels much better today than yesterday.  States cough is improving.  Reports he had a dull right sided chest pain this AM which resolved on it's own and did not return.     Review of Systems:  Constitutional: no fever, chills  Neuro: no headache, lightheadedness, dizziness.  Resp: + productive and dry cough, no wheezing, shortness of breath  CVS: no chest pain, palpitations, leg swelling  GI: no abdominal pain, nausea, vomiting, diarrhea   : no dysuria, frequency, incontinence  Skin: no itching, burning, rashes, or lesions   Msk: no joint pain or swelling      T(F): 97.5 (17 @ 12:00), Max: 98.4 (17 @ 20:30)  HR: 87 (17 @ 13:47) (62 - 118)  BP: 96/56 (17 @ 13:00) (95/57 - 134/62)  RR: 20 (17 @ 13:00) (12 - 24)  SpO2: 97% (17 @ 13:47) (88% - 99%)  Wt(kg): --      CAPILLARY BLOOD GLUCOSE  351 (2017 11:00)    I&O's Summary    2017 07:01  -  2017 07:00  --------------------------------------------------------  IN: 540 mL / OUT: 1200 mL / NET: -660 mL    2017 07:01  -  2017 14:04  --------------------------------------------------------  IN: 420 mL / OUT: 250 mL / NET: 170 mL        Physical Exam:     Gen: NAD, sitting up in chair  Neuro: AAO x 3  HEENT: NCAT, on nasal cannula  CV: RRR, +S1, +s2 no murmur  Pulm: Lungs CTA B/L, no w/r/r  GI: +BS x 4 Quadrants, soft, nontender, nondistended  Ext: No c/c/e      Meds:  enoxaparin Injectable 40 milliGRAM(s) SubCutaneous daily  clopidogrel Tablet 75 milliGRAM(s) Oral daily  cefTRIAXone   IVPB 1 Gram(s) IV Intermittent every 24 hours  azithromycin  IVPB 500 milliGRAM(s) IV Intermittent every 24 hours  tamsulosin 0.4 milliGRAM(s) Oral at bedtime  valsartan 160 milliGRAM(s) Oral daily  insulin glargine Injectable (LANTUS) 12 Unit(s) SubCutaneous at bedtime  insulin lispro Injectable (HumaLOG) 4 Unit(s) SubCutaneous three times a day before meals  insulin lispro (HumaLOG) corrective regimen sliding scale   SubCutaneous three times a day before meals  dextrose Gel 1 Dose(s) Oral once PRN  dextrose 50% Injectable 12.5 Gram(s) IV Push once  dextrose 50% Injectable 25 Gram(s) IV Push once  dextrose 50% Injectable 25 Gram(s) IV Push once  glucagon  Injectable 1 milliGRAM(s) IntraMuscular once PRN  methylPREDNISolone sodium succinate Injectable 40 milliGRAM(s) IV Push two times a day  atorvastatin 40 milliGRAM(s) Oral at bedtime  ALBUTerol/ipratropium for Nebulization 3 milliLiter(s) Nebulizer every 6 hours  buDESOnide 160 MICROgram(s)/formoterol 4.5 MICROgram(s) Inhaler 2 Puff(s) Inhalation two times a day  tiotropium 18 MICROgram(s) Capsule 1 Capsule(s) Inhalation daily  dextrose 5%. 1000 milliLiter(s) IV Continuous <Continuous>                            13.6   11.2  )-----------( 231      ( 2017 05:59 )             42.4       07-07    137  |  97  |  30<H>  ----------------------------<  206<H>  5.3   |  37<H>  |  1.30    Ca    9.3      2017 05:59  Phos  4.2     07-  Mg     2.3     -    TPro  6.6  /  Alb  3.3  /  TBili  0.2  /  DBili  x   /  AST  17  /  ALT  19  /  AlkPhos  83  07-07    Lactate 2.3           -07 @ 05:59    Lactate 2.4           07-06 @ 14:48      CARDIAC MARKERS ( 2017 14:40 )  <.015 ng/mL / x     / 158 U/L / x     / 10.7 ng/mL  CARDIAC MARKERS ( 2017 08:14 )  <.015 ng/mL / x     / 181 U/L / x     / 11.1 ng/mL      PT/INR - ( 2017 15:50 )   PT: 10.6 sec;   INR: 0.97 ratio         PTT - ( 2017 15:50 )  PTT:36.7 sec  Urinalysis Basic - ( 2017 16:39 )    Color: Yellow / Appearance: Clear / S.020 / pH: x  Gluc: x / Ketone: Negative  / Bili: Negative / Urobili: Negative   Blood: x / Protein: 25 mg/dL / Nitrite: Negative   Leuk Esterase: Negative / RBC: 0-2 /HPF / WBC 0-2   Sq Epi: x / Non Sq Epi: x / Bacteria: x      ABG - ( 2017 09:35 )  pH: 7.33  /  pCO2: 64    /  pO2: 52    / HCO3: 29    / Base Excess: 6.8   /  SaO2: 85            Radiology: ***    Bedside Lung U/S: ***    Bedside Cardiac U/S: ***    CENTRAL LINE: Y/N   DATE INSERTED:   REMOVE: Y/N    SHARMA: Y/N      DATE INSERTED:        REMOVE: Y/N    A-LINE: Y/N     DATE INSERTED:              REMOVE: Y/N    GLOBAL ISSUE/BEST PRACTICE:  Analgesia: None  Sedation: None  HOB elevation: yes  Stress ulcer prophylaxis: None  VTE prophylaxis: Lovenox  Glycemic control: Lantus 12U QHS, 4U premeal and sliding scale  Nutrition: Consistent Carb/DASH-TLC    CODE STATUS: *** Interval events:   On nasal cannula overnight, patient reports he felt okay on nasal cannula.  Reports his breathing feels much better today than yesterday.  States cough is improving.  Reports he had a dull right sided chest pain this AM which resolved on it's own and did not return.     Review of Systems:  Constitutional: no fever, chills  Neuro: no headache, lightheadedness, dizziness.  Resp: + productive and dry cough, no wheezing, shortness of breath  CVS: no chest pain, palpitations, leg swelling  GI: no abdominal pain, nausea, vomiting, diarrhea   : no dysuria, frequency, incontinence  Skin: no itching, burning, rashes, or lesions   Msk: no joint pain or swelling      T(F): 97.5 (17 @ 12:00), Max: 98.4 (17 @ 20:30)  HR: 87 (17 @ 13:47) (62 - 118)  BP: 96/56 (17 @ 13:00) (95/57 - 134/62)  RR: 20 (17 @ 13:00) (12 - 24)  SpO2: 97% (17 @ 13:47) (88% - 99%)  Wt(kg): 94.4 kg      CAPILLARY BLOOD GLUCOSE  351 (2017 11:00)    I&O's Summary    2017 07:01  -  2017 07:00  --------------------------------------------------------  IN: 540 mL / OUT: 1200 mL / NET: -660 mL    2017 07:01  -  2017 14:04  --------------------------------------------------------  IN: 420 mL / OUT: 250 mL / NET: 170 mL      Physical Exam:     Gen: NAD, sitting up in chair  Neuro: AAO x 3  HEENT: NCAT, on nasal cannula  CV: RRR, +S1, +s2 no murmur  Pulm: Lungs CTA B/L, no w/r/r  GI: +BS x 4 Quadrants, soft, nontender, nondistended  Ext: No c/c/e      Meds:  enoxaparin Injectable 40 milliGRAM(s) SubCutaneous daily  clopidogrel Tablet 75 milliGRAM(s) Oral daily  cefTRIAXone   IVPB 1 Gram(s) IV Intermittent every 24 hours  azithromycin  IVPB 500 milliGRAM(s) IV Intermittent every 24 hours  tamsulosin 0.4 milliGRAM(s) Oral at bedtime  valsartan 160 milliGRAM(s) Oral daily  insulin glargine Injectable (LANTUS) 12 Unit(s) SubCutaneous at bedtime  insulin lispro Injectable (HumaLOG) 4 Unit(s) SubCutaneous three times a day before meals  insulin lispro (HumaLOG) corrective regimen sliding scale   SubCutaneous three times a day before meals  methylPREDNISolone sodium succinate Injectable 40 milliGRAM(s) IV Push two times a day  atorvastatin 40 milliGRAM(s) Oral at bedtime  ALBUTerol/ipratropium for Nebulization 3 milliLiter(s) Nebulizer every 6 hours  buDESOnide 160 MICROgram(s)/formoterol 4.5 MICROgram(s) Inhaler 2 Puff(s) Inhalation two times a day  tiotropium 18 MICROgram(s) Capsule 1 Capsule(s) Inhalation daily               13.6   11.2  )-----------( 231      ( 2017 05:59 )             42.4       137  |  97  |  30<H>  ----------------------------<  206<H>  5.3   |  37  |  1.30    Ca    9.3      2017 05:59  Phos  4.2     07-07  Mg     2.3     07-07    TPro  6.6  /  Alb  3.3  /  TBili  0.2  /  DBili  x   /  AST  17  /  ALT  19  /  AlkPhos  83  -07    Lactate 2.3           - @ 05:59    Lactate 2.4           07- @ 14:48      CARDIAC MARKERS ( 2017 14:40 )  <.015 ng/mL / x     / 158 U/L / x     / 10.7 ng/mL  CARDIAC MARKERS ( 2017 08:14 )  <.015 ng/mL / x     / 181 U/L / x     / 11.1 ng/mL    PT/INR - ( 2017 15:50 )   PT: 10.6 sec;   INR: 0.97 ratio    PTT - ( 2017 15:50 )  PTT:36.7 sec    Urinalysis Basic - ( 2017 16:39 )    Color: Yellow / Appearance: Clear / S.020 / pH: x  Gluc: x / Ketone: Negative  / Bili: Negative / Urobili: Negative   Blood: x / Protein: 25 mg/dL / Nitrite: Negative   Leuk Esterase: Negative / RBC: 0-2 /HPF / WBC 0-2   Sq Epi: x / Non Sq Epi: x / Bacteria: x      ABG - ( 2017 09:35 )  pH: 7.33  /  pCO2: 64    /  pO2: 52    / HCO3: 29    / Base Excess: 6.8   /  SaO2: 85          CXR: hyperinflated lungs with flattened hemidiaphragm. Stable left basilar opacity    CENTRAL LINE: N    SHARMA: N    A-LINE: N    GLOBAL ISSUE/BEST PRACTICE:  Analgesia: None  Sedation: None  HOB elevation: yes  Stress ulcer prophylaxis: N/A  VTE prophylaxis: Lovenox  Glycemic control: Lantus 12U QHS, 4U premeal and sliding scale  Nutrition: Consistent Carb/DASH-TLC    CODE STATUS: full

## 2017-07-08 DIAGNOSIS — J96.01 ACUTE RESPIRATORY FAILURE WITH HYPOXIA: ICD-10-CM

## 2017-07-08 DIAGNOSIS — N40.0 BENIGN PROSTATIC HYPERPLASIA WITHOUT LOWER URINARY TRACT SYMPTOMS: ICD-10-CM

## 2017-07-08 LAB
ALBUMIN SERPL ELPH-MCNC: 3.4 G/DL — SIGNIFICANT CHANGE UP (ref 3.3–5)
ALP SERPL-CCNC: 89 U/L — SIGNIFICANT CHANGE UP (ref 40–120)
ALT FLD-CCNC: 33 U/L — SIGNIFICANT CHANGE UP (ref 12–78)
ANION GAP SERPL CALC-SCNC: 1 MMOL/L — LOW (ref 5–17)
AST SERPL-CCNC: 25 U/L — SIGNIFICANT CHANGE UP (ref 15–37)
BASOPHILS # BLD AUTO: 0.1 K/UL — SIGNIFICANT CHANGE UP (ref 0–0.2)
BASOPHILS NFR BLD AUTO: 1 % — SIGNIFICANT CHANGE UP (ref 0–2)
BILIRUB SERPL-MCNC: 0.3 MG/DL — SIGNIFICANT CHANGE UP (ref 0.2–1.2)
BUN SERPL-MCNC: 30 MG/DL — HIGH (ref 7–23)
CALCIUM SERPL-MCNC: 9.5 MG/DL — SIGNIFICANT CHANGE UP (ref 8.5–10.1)
CHLORIDE SERPL-SCNC: 99 MMOL/L — SIGNIFICANT CHANGE UP (ref 96–108)
CO2 SERPL-SCNC: 40 MMOL/L — HIGH (ref 22–31)
CREAT SERPL-MCNC: 1.3 MG/DL — SIGNIFICANT CHANGE UP (ref 0.5–1.3)
EOSINOPHIL # BLD AUTO: 0.5 K/UL — SIGNIFICANT CHANGE UP (ref 0–0.5)
EOSINOPHIL NFR BLD AUTO: 3.8 % — SIGNIFICANT CHANGE UP (ref 0–6)
GLUCOSE SERPL-MCNC: 119 MG/DL — HIGH (ref 70–99)
HCT VFR BLD CALC: 44.1 % — SIGNIFICANT CHANGE UP (ref 39–50)
HGB BLD-MCNC: 13.6 G/DL — SIGNIFICANT CHANGE UP (ref 13–17)
LYMPHOCYTES # BLD AUTO: 2.9 K/UL — SIGNIFICANT CHANGE UP (ref 1–3.3)
LYMPHOCYTES # BLD AUTO: 21.2 % — SIGNIFICANT CHANGE UP (ref 13–44)
MAGNESIUM SERPL-MCNC: 2 MG/DL — SIGNIFICANT CHANGE UP (ref 1.6–2.6)
MCHC RBC-ENTMCNC: 27.7 PG — SIGNIFICANT CHANGE UP (ref 27–34)
MCHC RBC-ENTMCNC: 30.9 GM/DL — LOW (ref 32–36)
MCV RBC AUTO: 89.6 FL — SIGNIFICANT CHANGE UP (ref 80–100)
MONOCYTES # BLD AUTO: 1.1 K/UL — HIGH (ref 0–0.9)
MONOCYTES NFR BLD AUTO: 8.5 % — SIGNIFICANT CHANGE UP (ref 1–9)
NEUTROPHILS # BLD AUTO: 8.8 K/UL — HIGH (ref 1.8–7.4)
NEUTROPHILS NFR BLD AUTO: 65.4 % — SIGNIFICANT CHANGE UP (ref 43–77)
PHOSPHATE SERPL-MCNC: 3.9 MG/DL — SIGNIFICANT CHANGE UP (ref 2.5–4.5)
PLATELET # BLD AUTO: 244 K/UL — SIGNIFICANT CHANGE UP (ref 150–400)
POTASSIUM SERPL-MCNC: 4.7 MMOL/L — SIGNIFICANT CHANGE UP (ref 3.5–5.3)
POTASSIUM SERPL-SCNC: 4.7 MMOL/L — SIGNIFICANT CHANGE UP (ref 3.5–5.3)
PROT SERPL-MCNC: 6.5 G/DL — SIGNIFICANT CHANGE UP (ref 6–8.3)
RAPID RVP RESULT: SIGNIFICANT CHANGE UP
RBC # BLD: 4.92 M/UL — SIGNIFICANT CHANGE UP (ref 4.2–5.8)
RBC # FLD: 14.1 % — SIGNIFICANT CHANGE UP (ref 10.3–14.5)
SODIUM SERPL-SCNC: 140 MMOL/L — SIGNIFICANT CHANGE UP (ref 135–145)
WBC # BLD: 13.5 K/UL — HIGH (ref 3.8–10.5)
WBC # FLD AUTO: 13.5 K/UL — HIGH (ref 3.8–10.5)

## 2017-07-08 PROCEDURE — 71250 CT THORAX DX C-: CPT | Mod: 26

## 2017-07-08 PROCEDURE — 99233 SBSQ HOSP IP/OBS HIGH 50: CPT

## 2017-07-08 RX ORDER — IPRATROPIUM/ALBUTEROL SULFATE 18-103MCG
3 AEROSOL WITH ADAPTER (GRAM) INHALATION EVERY 6 HOURS
Qty: 0 | Refills: 0 | Status: DISCONTINUED | OUTPATIENT
Start: 2017-07-08 | End: 2017-07-08

## 2017-07-08 RX ORDER — ALBUTEROL 90 UG/1
2.5 AEROSOL, METERED ORAL EVERY 6 HOURS
Qty: 0 | Refills: 0 | Status: DISCONTINUED | OUTPATIENT
Start: 2017-07-08 | End: 2017-07-10

## 2017-07-08 RX ADMIN — Medication 40 MILLIGRAM(S): at 05:55

## 2017-07-08 RX ADMIN — VALSARTAN 160 MILLIGRAM(S): 80 TABLET ORAL at 05:54

## 2017-07-08 RX ADMIN — TIOTROPIUM BROMIDE 1 CAPSULE(S): 18 CAPSULE ORAL; RESPIRATORY (INHALATION) at 05:53

## 2017-07-08 RX ADMIN — Medication 6 UNIT(S): at 08:35

## 2017-07-08 RX ADMIN — Medication 6 UNIT(S): at 17:54

## 2017-07-08 RX ADMIN — CLOPIDOGREL BISULFATE 75 MILLIGRAM(S): 75 TABLET, FILM COATED ORAL at 12:15

## 2017-07-08 RX ADMIN — ENOXAPARIN SODIUM 40 MILLIGRAM(S): 100 INJECTION SUBCUTANEOUS at 12:15

## 2017-07-08 RX ADMIN — ATORVASTATIN CALCIUM 40 MILLIGRAM(S): 80 TABLET, FILM COATED ORAL at 22:14

## 2017-07-08 RX ADMIN — CEFTRIAXONE 100 GRAM(S): 500 INJECTION, POWDER, FOR SOLUTION INTRAMUSCULAR; INTRAVENOUS at 09:39

## 2017-07-08 RX ADMIN — INSULIN GLARGINE 12 UNIT(S): 100 INJECTION, SOLUTION SUBCUTANEOUS at 22:14

## 2017-07-08 RX ADMIN — Medication 3 MILLILITER(S): at 01:18

## 2017-07-08 RX ADMIN — BUDESONIDE AND FORMOTEROL FUMARATE DIHYDRATE 2 PUFF(S): 160; 4.5 AEROSOL RESPIRATORY (INHALATION) at 05:53

## 2017-07-08 RX ADMIN — Medication 6 UNIT(S): at 12:16

## 2017-07-08 RX ADMIN — Medication 1: at 08:34

## 2017-07-08 RX ADMIN — Medication 2: at 17:54

## 2017-07-08 RX ADMIN — TAMSULOSIN HYDROCHLORIDE 0.4 MILLIGRAM(S): 0.4 CAPSULE ORAL at 22:14

## 2017-07-08 RX ADMIN — AZITHROMYCIN 255 MILLIGRAM(S): 500 TABLET, FILM COATED ORAL at 10:37

## 2017-07-08 RX ADMIN — Medication 3 MILLILITER(S): at 07:59

## 2017-07-08 RX ADMIN — Medication 3: at 12:16

## 2017-07-08 NOTE — PROGRESS NOTE ADULT - SUBJECTIVE AND OBJECTIVE BOX
Patient is a 77y old  Male who presents with a chief complaint of respiratory distress (08 Jul 2017 03:41)      INTERVAL HPI/OVERNIGHT EVENTS: pt states SOB and wheezing is much improved. Denies fever, chills, cough, CP.     MEDICATIONS  (STANDING):  insulin glargine Injectable (LANTUS) 12 Unit(s) SubCutaneous at bedtime  enoxaparin Injectable 40 milliGRAM(s) SubCutaneous daily  insulin lispro (HumaLOG) corrective regimen sliding scale   SubCutaneous three times a day before meals  dextrose 5%. 1000 milliLiter(s) (50 mL/Hr) IV Continuous <Continuous>  dextrose 50% Injectable 12.5 Gram(s) IV Push once  dextrose 50% Injectable 25 Gram(s) IV Push once  dextrose 50% Injectable 25 Gram(s) IV Push once  buDESOnide 160 MICROgram(s)/formoterol 4.5 MICROgram(s) Inhaler 2 Puff(s) Inhalation two times a day  tiotropium 18 MICROgram(s) Capsule 1 Capsule(s) Inhalation daily  clopidogrel Tablet 75 milliGRAM(s) Oral daily  tamsulosin 0.4 milliGRAM(s) Oral at bedtime  atorvastatin 40 milliGRAM(s) Oral at bedtime  valsartan 160 milliGRAM(s) Oral daily  cefTRIAXone   IVPB 1 Gram(s) IV Intermittent every 24 hours  azithromycin  IVPB 500 milliGRAM(s) IV Intermittent every 24 hours  insulin lispro Injectable (HumaLOG) 6 Unit(s) SubCutaneous three times a day before meals  predniSONE   Tablet 40 milliGRAM(s) Oral daily    MEDICATIONS  (PRN):  dextrose Gel 1 Dose(s) Oral once PRN Blood Glucose LESS THAN 70 milliGRAM(s)/deciliter  glucagon  Injectable 1 milliGRAM(s) IntraMuscular once PRN Glucose LESS THAN 70 milligrams/deciliter  ALBUTerol    0.083% 2.5 milliGRAM(s) Nebulizer every 6 hours PRN Shortness of Breath and/or Wheezing      Allergies    No Known Allergies    Intolerances    shellfish (Nausea)      REVIEW OF SYSTEMS:  CONSTITUTIONAL: No fever or chills  HEENT:  No headache, no sore throat  RESPIRATORY: + slight wheezing; No cough, or shortness of breath  CARDIOVASCULAR: No chest pain, palpitations, or leg swelling  GASTROINTESTINAL: No nausea, vomiting, or diarrhea  GENITOURINARY: No dysuria, frequency, or hematuria  NEUROLOGICAL: no focal weakness or dizziness  SKIN:  No rashes or lesions   MUSCULOSKELETAL: no myalgias   PSYCHIATRIC: No depression or anxiety  ROS Unable to obtain due to - [ ] Dementia  [ ] Lethargy  REST OF REVIEW Of SYSTEM - [ ] Normal     Vital Signs Last 24 Hrs  T(C): 37.1 (08 Jul 2017 20:04), Max: 37.1 (08 Jul 2017 12:01)  T(F): 98.8 (08 Jul 2017 20:04), Max: 98.8 (08 Jul 2017 20:04)  HR: 109 (08 Jul 2017 15:32) (88 - 120)  BP: 112/63 (08 Jul 2017 20:04) (103/59 - 124/64)  BP(mean): 78 (08 Jul 2017 09:00) (71 - 84)  RR: 16 (08 Jul 2017 20:04) (12 - 30)  SpO2: 100% (08 Jul 2017 20:04) (93% - 100%)    PHYSICAL EXAM:  GENERAL: NAD  HEENT:  EOMI, moist mucous membranes  CHEST/LUNG:  mild wheezing b/l; even air entry b/l  HEART:  RRR, S1, S2  ABDOMEN:  BS+, soft, nontender, nondistended  EXTREMITIES: no edema or calf tenderness  SKIN:  no rash  NERVOUS SYSTEM: AA&Ox3    LABS:                        13.6   13.5  )-----------( 244      ( 08 Jul 2017 05:43 )             44.1     CBC Full  -  ( 08 Jul 2017 05:43 )  WBC Count : 13.5 K/uL  Hemoglobin : 13.6 g/dL  Hematocrit : 44.1 %  Platelet Count - Automated : 244 K/uL  Mean Cell Volume : 89.6 fl  Mean Cell Hemoglobin : 27.7 pg  Mean Cell Hemoglobin Concentration : 30.9 gm/dL  Auto Neutrophil # : 8.8 K/uL  Auto Lymphocyte # : 2.9 K/uL  Auto Monocyte # : 1.1 K/uL  Auto Eosinophil # : 0.5 K/uL  Auto Basophil # : 0.1 K/uL  Auto Neutrophil % : 65.4 %  Auto Lymphocyte % : 21.2 %  Auto Monocyte % : 8.5 %  Auto Eosinophil % : 3.8 %  Auto Basophil % : 1.0 %    08 Jul 2017 05:43    140    |  99     |  30     ----------------------------<  119    4.7     |  40     |  1.30     Ca    9.5        08 Jul 2017 05:43  Phos  3.9       08 Jul 2017 05:43  Mg     2.0       08 Jul 2017 05:43    TPro  6.5    /  Alb  3.4    /  TBili  0.3    /  DBili  x      /  AST  25     /  ALT  33     /  AlkPhos  89     08 Jul 2017 05:43        CAPILLARY BLOOD GLUCOSE  223 (08 Jul 2017 16:37)  290 (08 Jul 2017 11:51)  167 (08 Jul 2017 07:52)  175 (07 Jul 2017 23:00)  85 (07 Jul 2017 22:00)          RECENT CULTURES:        RESPIRATORY CULTURES:      cardiac Enzyme:  07-06 @ 14:40    CK:  158    CKMB:  --    CPK Mass Assay:  6.8    troponin i:  <.015    BNP: 168  07-06 @ 08:14    CK:  181    CKMB:  --    CPK Mass Assay:  6.1    troponin i:  <.015    BNP: 150        Anemia panel:          RADIOLOGY & ADDITIONAL TESTS:    Personally reviewed.     Consultant(s) Notes Reviewed:  [x] YES  [ ] NO    Care Discussed with [x] Consultants  [x] Patient  [ ] Family  [ ]      [ ] Other; RN  DVT ppx: lovenox Patient is a 77y old  Male who presents with a chief complaint of respiratory distress (08 Jul 2017 03:41)      INTERVAL HPI/OVERNIGHT EVENTS: pt states SOB and wheezing is much improved. Denies fever, chills, cough, CP.     MEDICATIONS  (STANDING):  insulin glargine Injectable (LANTUS) 12 Unit(s) SubCutaneous at bedtime  enoxaparin Injectable 40 milliGRAM(s) SubCutaneous daily  insulin lispro (HumaLOG) corrective regimen sliding scale   SubCutaneous three times a day before meals  dextrose 5%. 1000 milliLiter(s) (50 mL/Hr) IV Continuous <Continuous>  dextrose 50% Injectable 12.5 Gram(s) IV Push once  dextrose 50% Injectable 25 Gram(s) IV Push once  dextrose 50% Injectable 25 Gram(s) IV Push once  buDESOnide 160 MICROgram(s)/formoterol 4.5 MICROgram(s) Inhaler 2 Puff(s) Inhalation two times a day  tiotropium 18 MICROgram(s) Capsule 1 Capsule(s) Inhalation daily  clopidogrel Tablet 75 milliGRAM(s) Oral daily  tamsulosin 0.4 milliGRAM(s) Oral at bedtime  atorvastatin 40 milliGRAM(s) Oral at bedtime  valsartan 160 milliGRAM(s) Oral daily  cefTRIAXone   IVPB 1 Gram(s) IV Intermittent every 24 hours  azithromycin  IVPB 500 milliGRAM(s) IV Intermittent every 24 hours  insulin lispro Injectable (HumaLOG) 6 Unit(s) SubCutaneous three times a day before meals  predniSONE   Tablet 40 milliGRAM(s) Oral daily    MEDICATIONS  (PRN):  dextrose Gel 1 Dose(s) Oral once PRN Blood Glucose LESS THAN 70 milliGRAM(s)/deciliter  glucagon  Injectable 1 milliGRAM(s) IntraMuscular once PRN Glucose LESS THAN 70 milligrams/deciliter  ALBUTerol    0.083% 2.5 milliGRAM(s) Nebulizer every 6 hours PRN Shortness of Breath and/or Wheezing      Allergies    No Known Allergies    Intolerances    shellfish (Nausea)      REVIEW OF SYSTEMS:  CONSTITUTIONAL: No fever or chills  HEENT:  No headache, no sore throat  RESPIRATORY: + slight wheezing; No cough, or shortness of breath  CARDIOVASCULAR: No chest pain, palpitations, or leg swelling  GASTROINTESTINAL: No nausea, vomiting, or diarrhea  GENITOURINARY: No dysuria, frequency, or hematuria  NEUROLOGICAL: no focal weakness or dizziness  SKIN:  No rashes or lesions   MUSCULOSKELETAL: no myalgias   PSYCHIATRIC: No depression or anxiety  ROS Unable to obtain due to - [ ] Dementia  [ ] Lethargy  REST OF REVIEW Of SYSTEM - [ ] Normal     Vital Signs Last 24 Hrs  T(C): 37.1 (08 Jul 2017 20:04), Max: 37.1 (08 Jul 2017 12:01)  T(F): 98.8 (08 Jul 2017 20:04), Max: 98.8 (08 Jul 2017 20:04)  HR: 109 (08 Jul 2017 15:32) (88 - 120)  BP: 112/63 (08 Jul 2017 20:04) (103/59 - 124/64)  BP(mean): 78 (08 Jul 2017 09:00) (71 - 84)  RR: 16 (08 Jul 2017 20:04) (12 - 30)  SpO2: 100% (08 Jul 2017 20:04) (93% - 100%)    PHYSICAL EXAM:  GENERAL: NAD  HEENT:  EOMI, moist mucous membranes  CHEST/LUNG:  mild wheezing b/l; even air entry b/l  HEART:  RRR, S1, S2  ABDOMEN:  BS+, soft, nontender, nondistended  EXTREMITIES: no edema or calf tenderness  SKIN:  no rash  NERVOUS SYSTEM: AA&Ox3    LABS:                        13.6   13.5  )-----------( 244      ( 08 Jul 2017 05:43 )             44.1     CBC Full  -  ( 08 Jul 2017 05:43 )  WBC Count : 13.5 K/uL  Hemoglobin : 13.6 g/dL  Hematocrit : 44.1 %  Platelet Count - Automated : 244 K/uL  Mean Cell Volume : 89.6 fl  Mean Cell Hemoglobin : 27.7 pg  Mean Cell Hemoglobin Concentration : 30.9 gm/dL  Auto Neutrophil # : 8.8 K/uL  Auto Lymphocyte # : 2.9 K/uL  Auto Monocyte # : 1.1 K/uL  Auto Eosinophil # : 0.5 K/uL  Auto Basophil # : 0.1 K/uL  Auto Neutrophil % : 65.4 %  Auto Lymphocyte % : 21.2 %  Auto Monocyte % : 8.5 %  Auto Eosinophil % : 3.8 %  Auto Basophil % : 1.0 %    08 Jul 2017 05:43    140    |  99     |  30     ----------------------------<  119    4.7     |  40     |  1.30     Ca    9.5        08 Jul 2017 05:43  Phos  3.9       08 Jul 2017 05:43  Mg     2.0       08 Jul 2017 05:43    TPro  6.5    /  Alb  3.4    /  TBili  0.3    /  DBili  x      /  AST  25     /  ALT  33     /  AlkPhos  89     08 Jul 2017 05:43      CAPILLARY BLOOD GLUCOSE  223 (08 Jul 2017 16:37)  290 (08 Jul 2017 11:51)  167 (08 Jul 2017 07:52)  175 (07 Jul 2017 23:00)  85 (07 Jul 2017 22:00)          RECENT CULTURES:        RESPIRATORY CULTURES:      cardiac Enzyme:  07-06 @ 14:40    CK:  158    CKMB:  --    CPK Mass Assay:  6.8    troponin i:  <.015    BNP: 168  07-06 @ 08:14    CK:  181    CKMB:  --    CPK Mass Assay:  6.1    troponin i:  <.015    BNP: 150        Anemia panel:          RADIOLOGY & ADDITIONAL TESTS:    Personally reviewed.     Consultant(s) Notes Reviewed:  [x] YES  [ ] NO    Care Discussed with [x] Consultants  [x] Patient  [ ] Family  [ ]      [ ] Other; RN  DVT ppx: lovenox

## 2017-07-08 NOTE — PROGRESS NOTE ADULT - SUBJECTIVE AND OBJECTIVE BOX
24 hour events:   on BiPAP 22/8 overnight    Review of Systems:  Constitutional: No fever, chills, fatigue  Neuro: No headache, numbness, weakness  Resp: No cough, wheezing, shortness of breath  CVS: No chest pain, palpitations, leg swelling  GI: No abdominal pain, nausea, vomiting, diarrhea   : No dysuria, frequency, incontinence  Skin: No itching, burning, rashes, or lesions   Msk: No joint pain or swelling  Psych: No depression, anxiety, mood swings    T(F): 97.7 (07-08-17 @ 04:00), Max: 97.8 (07-07-17 @ 08:04)  HR: 95 (07-08-17 @ 08:00) (73 - 118)  BP: 124/58 (07-08-17 @ 07:00) (95/57 - 130/63)  RR: 12 (07-08-17 @ 07:00) (12 - 30)  SpO2: 99% (07-08-17 @ 08:00) (92% - 100%)    CAPILLARY BLOOD GLUCOSE  167 (08 Jul 2017 07:52)    I&O's Summary    07 Jul 2017 07:01  -  08 Jul 2017 07:00  --------------------------------------------------------  IN: 1730 mL / OUT: 2250 mL / NET: -520 mL        Physical Exam:   Gen:  Neuro:  HEENT:  Resp:  CVS:  Abd:  Ext:  Skin:    Meds:  cefTRIAXone   IVPB IV Intermittent  azithromycin  IVPB IV Intermittent  tamsulosin Oral  valsartan Oral  insulin glargine Injectable (LANTUS) SubCutaneous  insulin lispro (HumaLOG) corrective regimen sliding scale SubCutaneous  dextrose Gel Oral PRN  dextrose 50% Injectable IV Push  dextrose 50% Injectable IV Push  dextrose 50% Injectable IV Push  glucagon  Injectable IntraMuscular PRN  atorvastatin Oral  insulin lispro Injectable (HumaLOG) SubCutaneous  predniSONE   Tablet Oral  ALBUTerol/ipratropium for Nebulization Nebulizer  buDESOnide 160 MICROgram(s)/formoterol 4.5 MICROgram(s) Inhaler Inhalation  tiotropium 18 MICROgram(s) Capsule Inhalation  enoxaparin Injectable SubCutaneous  clopidogrel Tablet Oral  dextrose 5%. IV Continuous                        13.6   13.5  )-----------( 244      ( 08 Jul 2017 05:43 )             44.1       07-08    140  |  99  |  30<H>  ----------------------------<  119<H>  4.7   |  40<H>  |  1.30    Ca    9.5      08 Jul 2017 05:43  Phos  3.9     07-08  Mg     2.0     07-08    TPro  6.5  /  Alb  3.4  /  TBili  0.3  /  DBili  x   /  AST  25  /  ALT  33  /  AlkPhos  89  07-08    BCx neg  UCx <10K CFU    Radiology: ***    Bedside ultrasound: ***    CENTRAL LINE: N/Y          DATE INSERTED:              REMOVE: Y/N  SHARMA: N/Y                       DATE INSERTED:              REMOVE: Y/N  A-LINE: N/Y                       DATE INSERTED:              REMOVE: Y/N    GLOBAL ISSUE/BEST PRACTICE:  Analgesia:  Sedation:  CAM-ICU:   HOB elevation: yes  Stress ulcer prophylaxis:  VTE prophylaxis:  Glycemic control:  Nutrition:    CODE STATUS: *** 24 hour events:   on BiPAP 22/8 overnight, found it uncomfortable    T(F): 97.7 (07-08-17 @ 04:00), Max: 97.8 (07-07-17 @ 08:04)  HR: 95 (07-08-17 @ 08:00) (73 - 118)  BP: 124/58 (07-08-17 @ 07:00) (95/57 - 130/63)  RR: 12 (07-08-17 @ 07:00) (12 - 30)  SpO2: 99% (07-08-17 @ 08:00) (92% - 100%)    CAPILLARY BLOOD GLUCOSE  167 (08 Jul 2017 07:52)    I&O's Summary    07 Jul 2017 07:01  -  08 Jul 2017 07:00  --------------------------------------------------------  IN: 1730 mL / OUT: 2250 mL / NET: -520 mL    Physical Exam:   Gen: NAD, laying in bed  Neuro: alert, appropriate  HEENT: OP clear  Resp: expiratory wheezing b/l, good air movement  CVS: RRR  Abd: soft, NTND  Ext: no edema    Meds:  cefTRIAXone   IVPB IV Intermittent  azithromycin  IVPB IV Intermittent  tamsulosin Oral  valsartan Oral  insulin glargine Injectable (LANTUS) SubCutaneous  insulin lispro (HumaLOG) corrective regimen sliding scale SubCutaneous  dextrose Gel Oral PRN  dextrose 50% Injectable IV Push  dextrose 50% Injectable IV Push  dextrose 50% Injectable IV Push  glucagon  Injectable IntraMuscular PRN  atorvastatin Oral  insulin lispro Injectable (HumaLOG) SubCutaneous  predniSONE   Tablet Oral  ALBUTerol/ipratropium for Nebulization Nebulizer  buDESOnide 160 MICROgram(s)/formoterol 4.5 MICROgram(s) Inhaler Inhalation  tiotropium 18 MICROgram(s) Capsule Inhalation  enoxaparin Injectable SubCutaneous  clopidogrel Tablet Oral  dextrose 5%. IV Continuous                        13.6   13.5  )-----------( 244      ( 08 Jul 2017 05:43 )             44.1       07-08    140  |  99  |  30<H>  ----------------------------<  119<H>  4.7   |  40<H>  |  1.30    Ca    9.5      08 Jul 2017 05:43  Phos  3.9     07-08  Mg     2.0     07-08    TPro  6.5  /  Alb  3.4  /  TBili  0.3  /  DBili  x   /  AST  25  /  ALT  33  /  AlkPhos  89  07-08    BCx neg  UCx <10K CFU    Radiology:   < from: CT Chest No Cont (07.08.17 @ 09:27) >  Evidence of centrilobular emphysema. No lobar consolidation effusion or vascular congestion. No suspicious pulmonary nodules. No pneumothorax.  No thoracic aortic aneurysm. No pericardial effusion. Central airway intact. Thyroid gland not enlarged.  No hilar lesions, evaluation limited due to lack of IV contrast. Stable subcarinal lymph node since prior exam. Sternal sutures present. Mediastinal clips present. No adrenal lesions identified. No acute osseous abnormalities.    CENTRAL LINE: N  SHARMA: N  A-LINE: N    GLOBAL ISSUE/BEST PRACTICE:  Analgesia: N  Sedation:N  CAM-ICU:  NEG  HOB elevation: yes  Stress ulcer prophylaxis:NA  VTE prophylaxis: Y  Glycemic control: Y  Nutrition: Y    CODE STATUS: FULL

## 2017-07-09 LAB
ANION GAP SERPL CALC-SCNC: 2 MMOL/L — LOW (ref 5–17)
BASE EXCESS BLDA CALC-SCNC: 12.1 MMOL/L — HIGH (ref -2–2)
BLOOD GAS COMMENTS ARTERIAL: SIGNIFICANT CHANGE UP
BLOOD GAS COMMENTS ARTERIAL: SIGNIFICANT CHANGE UP
BUN SERPL-MCNC: 27 MG/DL — HIGH (ref 7–23)
CALCIUM SERPL-MCNC: 9.3 MG/DL — SIGNIFICANT CHANGE UP (ref 8.5–10.1)
CHLORIDE SERPL-SCNC: 96 MMOL/L — SIGNIFICANT CHANGE UP (ref 96–108)
CO2 SERPL-SCNC: 42 MMOL/L — HIGH (ref 22–31)
CREAT SERPL-MCNC: 1.3 MG/DL — SIGNIFICANT CHANGE UP (ref 0.5–1.3)
GLUCOSE SERPL-MCNC: 158 MG/DL — HIGH (ref 70–99)
HCO3 BLDA-SCNC: 34 MMOL/L — HIGH (ref 23–27)
HCT VFR BLD CALC: 43.6 % — SIGNIFICANT CHANGE UP (ref 39–50)
HGB BLD-MCNC: 13.7 G/DL — SIGNIFICANT CHANGE UP (ref 13–17)
HOROWITZ INDEX BLDA+IHG-RTO: 21 — SIGNIFICANT CHANGE UP
MAGNESIUM SERPL-MCNC: 1.9 MG/DL — SIGNIFICANT CHANGE UP (ref 1.6–2.6)
MCHC RBC-ENTMCNC: 28.2 PG — SIGNIFICANT CHANGE UP (ref 27–34)
MCHC RBC-ENTMCNC: 31.4 GM/DL — LOW (ref 32–36)
MCV RBC AUTO: 89.8 FL — SIGNIFICANT CHANGE UP (ref 80–100)
PCO2 BLDA: 70 MMHG — CRITICAL HIGH (ref 32–46)
PH BLDA: 7.36 — SIGNIFICANT CHANGE UP (ref 7.35–7.45)
PHOSPHATE SERPL-MCNC: 3.4 MG/DL — SIGNIFICANT CHANGE UP (ref 2.5–4.5)
PLATELET # BLD AUTO: 224 K/UL — SIGNIFICANT CHANGE UP (ref 150–400)
PO2 BLDA: 75 MMHG — SIGNIFICANT CHANGE UP (ref 74–108)
POTASSIUM SERPL-MCNC: 4.1 MMOL/L — SIGNIFICANT CHANGE UP (ref 3.5–5.3)
POTASSIUM SERPL-SCNC: 4.1 MMOL/L — SIGNIFICANT CHANGE UP (ref 3.5–5.3)
PROCALCITONIN SERPL-MCNC: <0.05 NG/ML — HIGH (ref 0–0.04)
RBC # BLD: 4.86 M/UL — SIGNIFICANT CHANGE UP (ref 4.2–5.8)
RBC # FLD: 13.6 % — SIGNIFICANT CHANGE UP (ref 10.3–14.5)
SAO2 % BLDA: 95 % — SIGNIFICANT CHANGE UP (ref 92–96)
SODIUM SERPL-SCNC: 140 MMOL/L — SIGNIFICANT CHANGE UP (ref 135–145)
WBC # BLD: 10.2 K/UL — SIGNIFICANT CHANGE UP (ref 3.8–10.5)
WBC # FLD AUTO: 10.2 K/UL — SIGNIFICANT CHANGE UP (ref 3.8–10.5)

## 2017-07-09 PROCEDURE — 99233 SBSQ HOSP IP/OBS HIGH 50: CPT

## 2017-07-09 RX ORDER — INSULIN LISPRO 100/ML
6 VIAL (ML) SUBCUTANEOUS
Qty: 0 | Refills: 0 | Status: DISCONTINUED | OUTPATIENT
Start: 2017-07-09 | End: 2017-07-10

## 2017-07-09 RX ORDER — INSULIN LISPRO 100/ML
5 VIAL (ML) SUBCUTANEOUS
Qty: 0 | Refills: 0 | Status: DISCONTINUED | OUTPATIENT
Start: 2017-07-09 | End: 2017-07-09

## 2017-07-09 RX ADMIN — VALSARTAN 160 MILLIGRAM(S): 80 TABLET ORAL at 06:15

## 2017-07-09 RX ADMIN — Medication 5 UNIT(S): at 12:01

## 2017-07-09 RX ADMIN — AZITHROMYCIN 255 MILLIGRAM(S): 500 TABLET, FILM COATED ORAL at 11:34

## 2017-07-09 RX ADMIN — CLOPIDOGREL BISULFATE 75 MILLIGRAM(S): 75 TABLET, FILM COATED ORAL at 11:32

## 2017-07-09 RX ADMIN — BUDESONIDE AND FORMOTEROL FUMARATE DIHYDRATE 2 PUFF(S): 160; 4.5 AEROSOL RESPIRATORY (INHALATION) at 17:51

## 2017-07-09 RX ADMIN — TIOTROPIUM BROMIDE 1 CAPSULE(S): 18 CAPSULE ORAL; RESPIRATORY (INHALATION) at 06:16

## 2017-07-09 RX ADMIN — BUDESONIDE AND FORMOTEROL FUMARATE DIHYDRATE 2 PUFF(S): 160; 4.5 AEROSOL RESPIRATORY (INHALATION) at 06:15

## 2017-07-09 RX ADMIN — INSULIN GLARGINE 12 UNIT(S): 100 INJECTION, SOLUTION SUBCUTANEOUS at 21:28

## 2017-07-09 RX ADMIN — Medication 3: at 12:01

## 2017-07-09 RX ADMIN — Medication 6 UNIT(S): at 17:03

## 2017-07-09 RX ADMIN — Medication 1: at 08:05

## 2017-07-09 RX ADMIN — Medication 6 UNIT(S): at 08:06

## 2017-07-09 RX ADMIN — ATORVASTATIN CALCIUM 40 MILLIGRAM(S): 80 TABLET, FILM COATED ORAL at 21:28

## 2017-07-09 RX ADMIN — ENOXAPARIN SODIUM 40 MILLIGRAM(S): 100 INJECTION SUBCUTANEOUS at 11:32

## 2017-07-09 RX ADMIN — TAMSULOSIN HYDROCHLORIDE 0.4 MILLIGRAM(S): 0.4 CAPSULE ORAL at 21:28

## 2017-07-09 RX ADMIN — Medication 40 MILLIGRAM(S): at 06:15

## 2017-07-09 RX ADMIN — Medication 3: at 17:03

## 2017-07-09 NOTE — PROGRESS NOTE ADULT - SUBJECTIVE AND OBJECTIVE BOX
Patient is a 77y old  Male who presents with a chief complaint of respiratory distress (08 Jul 2017 03:41)      INTERVAL HPI/OVERNIGHT EVENTS: Pt reports slight wheeze. Denies SOB, CP, fever, cough, chills.    MEDICATIONS  (STANDING):  insulin glargine Injectable (LANTUS) 12 Unit(s) SubCutaneous at bedtime  enoxaparin Injectable 40 milliGRAM(s) SubCutaneous daily  insulin lispro (HumaLOG) corrective regimen sliding scale   SubCutaneous three times a day before meals  dextrose 5%. 1000 milliLiter(s) (50 mL/Hr) IV Continuous <Continuous>  dextrose 50% Injectable 12.5 Gram(s) IV Push once  dextrose 50% Injectable 25 Gram(s) IV Push once  dextrose 50% Injectable 25 Gram(s) IV Push once  buDESOnide 160 MICROgram(s)/formoterol 4.5 MICROgram(s) Inhaler 2 Puff(s) Inhalation two times a day  tiotropium 18 MICROgram(s) Capsule 1 Capsule(s) Inhalation daily  clopidogrel Tablet 75 milliGRAM(s) Oral daily  tamsulosin 0.4 milliGRAM(s) Oral at bedtime  atorvastatin 40 milliGRAM(s) Oral at bedtime  valsartan 160 milliGRAM(s) Oral daily  azithromycin  IVPB 500 milliGRAM(s) IV Intermittent every 24 hours  predniSONE   Tablet 40 milliGRAM(s) Oral daily  insulin lispro Injectable (HumaLOG) 6 Unit(s) SubCutaneous three times a day before meals    MEDICATIONS  (PRN):  dextrose Gel 1 Dose(s) Oral once PRN Blood Glucose LESS THAN 70 milliGRAM(s)/deciliter  glucagon  Injectable 1 milliGRAM(s) IntraMuscular once PRN Glucose LESS THAN 70 milligrams/deciliter  ALBUTerol    0.083% 2.5 milliGRAM(s) Nebulizer every 6 hours PRN Shortness of Breath and/or Wheezing      Allergies    No Known Allergies    Intolerances    shellfish (Nausea)      REVIEW OF SYSTEMS:  CONSTITUTIONAL: No fever or chills  HEENT:  No headache, no sore throat  RESPIRATORY: + slight wheezing; No cough, or shortness of breath  CARDIOVASCULAR: No chest pain, palpitations, or leg swelling  GASTROINTESTINAL: No nausea, vomiting, or diarrhea  GENITOURINARY: No dysuria, frequency, or hematuria  NEUROLOGICAL: no focal weakness or dizziness  SKIN:  No rashes or lesions   MUSCULOSKELETAL: no myalgias   PSYCHIATRIC: No depression or anxiety    Vital Signs Last 24 Hrs  T(C): 37.3 (09 Jul 2017 12:27), Max: 37.3 (09 Jul 2017 12:27)  T(F): 99.1 (09 Jul 2017 12:27), Max: 99.1 (09 Jul 2017 12:27)  HR: 106 (09 Jul 2017 12:27) (99 - 109)  BP: 93/56 (09 Jul 2017 12:27) (93/56 - 112/68)  BP(mean): --  RR: 16 (09 Jul 2017 12:27) (16 - 20)  SpO2: 96% (09 Jul 2017 12:27) (92% - 100%)    PHYSICAL EXAM:  GENERAL: NAD  HEENT:  EOMI, moist mucous membranes  CHEST/LUNG:  mild wheezing b/l; even air entry b/l  HEART:  RRR, S1, S2  ABDOMEN:  BS+, soft, nontender, nondistended  EXTREMITIES: no edema  SKIN:  no rash  NERVOUS SYSTEM: AA&Ox3    LABS:                        13.7   10.2  )-----------( 224      ( 09 Jul 2017 06:42 )             43.6     CBC Full  -  ( 09 Jul 2017 06:42 )  WBC Count : 10.2 K/uL  Hemoglobin : 13.7 g/dL  Hematocrit : 43.6 %  Platelet Count - Automated : 224 K/uL  Mean Cell Volume : 89.8 fl  Mean Cell Hemoglobin : 28.2 pg  Mean Cell Hemoglobin Concentration : 31.4 gm/dL  Auto Neutrophil # : x  Auto Lymphocyte # : x  Auto Monocyte # : x  Auto Eosinophil # : x  Auto Basophil # : x  Auto Neutrophil % : x  Auto Lymphocyte % : x  Auto Monocyte % : x  Auto Eosinophil % : x  Auto Basophil % : x    09 Jul 2017 06:42    140    |  96     |  27     ----------------------------<  158    4.1     |  42     |  1.30     Ca    9.3        09 Jul 2017 06:42  Phos  3.4       09 Jul 2017 06:42  Mg     1.9       09 Jul 2017 06:42          CAPILLARY BLOOD GLUCOSE  272 (09 Jul 2017 12:27)  174 (09 Jul 2017 08:11)  81 (08 Jul 2017 21:25)  223 (08 Jul 2017 16:37)          RECENT CULTURES:        RESPIRATORY CULTURES:      cardiac Enzyme:  07-06 @ 14:40    CK:  158    CKMB:  --    CPK Mass Assay:  6.8    troponin i:  <.015    BNP: 168  07-06 @ 08:14    CK:  181    CKMB:  --    CPK Mass Assay:  6.1    troponin i:  <.015    BNP: 150        Anemia panel:          RADIOLOGY & ADDITIONAL TESTS:    Personally reviewed.     Consultant(s) Notes Reviewed:  [x] YES  [ ] NO    Care Discussed with [x] Consultants  [x] Patient  [ ] Family  [ ]      [ ] Other; RN  DVT ppx: lovenox

## 2017-07-09 NOTE — PROGRESS NOTE ADULT - PROVIDER SPECIALTY LIST ADULT
Problem: Patient Care Overview  Goal: Plan of Care Review  Outcome: Ongoing (interventions implemented as appropriate)  Infant in open crib, VSS on room air. One bradycardic event noted today while infant was sleeping (see flowsheets, NNP notified). Infant tolerated Q3 nipple feedings of Neosure 22 with two spits after first feed. RN tried slow flow nipple and spits were reduced after that. Infant is voiding, no stools so far this shift. Multivitamins and propanalol given per MAR. Maternal grandmother called on behalf of mother, stating that infant's mother is sick with a virus. MGM gave the phone to mom, RN provided update to her. She stated she has one or two bottles of EBM at home and asked if she should continue to pump even through she has a virus. Mom also asked what she can do to increase her supply; RN advised her that she should continue to pump with a goal of 8-10 times per day. Mom stated she is not pumping that often. Will continue to monitor.        Pulmonology

## 2017-07-09 NOTE — PROGRESS NOTE ADULT - SUBJECTIVE AND OBJECTIVE BOX
Date/Time Patient Seen:  		  Referring MD:   Data Reviewed	       Patient is a 77y old  Male who presents with a chief complaint of respiratory distress (08 Jul 2017 03:41)      Subjective/HPI    pt w COPD, progressive SOB over the last two weeks while he was being tapered off of prednisone.  now moderate to severe and not responsive to home nebulizer w albuterol.  endorses dry cough, no fevers, no chest pain, no abd pain or n/v/d.     PAST MEDICAL & SURGICAL HISTORY:  Hypertension  Diabetes mellitus  COPD (chronic obstructive pulmonary disease)  Osteomyelitis  Asymptomatic Peripheral Vascular Disease  Dyslipidemia  CAD (Coronary Artery Disease)  Diabetes Mellitus, Type II  CABG (Coronary Artery Bypass Graft)        Medication list         MEDICATIONS  (STANDING):  insulin glargine Injectable (LANTUS) 12 Unit(s) SubCutaneous at bedtime  enoxaparin Injectable 40 milliGRAM(s) SubCutaneous daily  insulin lispro (HumaLOG) corrective regimen sliding scale   SubCutaneous three times a day before meals  dextrose 5%. 1000 milliLiter(s) (50 mL/Hr) IV Continuous <Continuous>  dextrose 50% Injectable 12.5 Gram(s) IV Push once  dextrose 50% Injectable 25 Gram(s) IV Push once  dextrose 50% Injectable 25 Gram(s) IV Push once  buDESOnide 160 MICROgram(s)/formoterol 4.5 MICROgram(s) Inhaler 2 Puff(s) Inhalation two times a day  tiotropium 18 MICROgram(s) Capsule 1 Capsule(s) Inhalation daily  clopidogrel Tablet 75 milliGRAM(s) Oral daily  tamsulosin 0.4 milliGRAM(s) Oral at bedtime  atorvastatin 40 milliGRAM(s) Oral at bedtime  valsartan 160 milliGRAM(s) Oral daily  cefTRIAXone   IVPB 1 Gram(s) IV Intermittent every 24 hours  azithromycin  IVPB 500 milliGRAM(s) IV Intermittent every 24 hours  insulin lispro Injectable (HumaLOG) 6 Unit(s) SubCutaneous three times a day before meals  predniSONE   Tablet 40 milliGRAM(s) Oral daily    MEDICATIONS  (PRN):  dextrose Gel 1 Dose(s) Oral once PRN Blood Glucose LESS THAN 70 milliGRAM(s)/deciliter  glucagon  Injectable 1 milliGRAM(s) IntraMuscular once PRN Glucose LESS THAN 70 milligrams/deciliter  ALBUTerol    0.083% 2.5 milliGRAM(s) Nebulizer every 6 hours PRN Shortness of Breath and/or Wheezing         Vitals log        ICU Vital Signs Last 24 Hrs  T(C): 36.6 (09 Jul 2017 04:36), Max: 37.1 (08 Jul 2017 12:01)  T(F): 97.9 (09 Jul 2017 04:36), Max: 98.8 (08 Jul 2017 20:04)  HR: 102 (09 Jul 2017 04:36) (95 - 120)  BP: 108/68 (09 Jul 2017 04:36) (107/56 - 124/58)  BP(mean): 78 (08 Jul 2017 09:00) (76 - 83)  ABP: --  ABP(mean): --  RR: 20 (09 Jul 2017 04:36) (12 - 28)  SpO2: 98% (09 Jul 2017 04:36) (93% - 100%)           Input and Output:  I&O's Detail    07 Jul 2017 07:01  -  08 Jul 2017 07:00  --------------------------------------------------------  IN:    Oral Fluid: 1430 mL    Solution: 50 mL    Solution: 250 mL  Total IN: 1730 mL    OUT:    Voided: 2250 mL  Total OUT: 2250 mL    Total NET: -520 mL      08 Jul 2017 07:01  -  09 Jul 2017 06:11  --------------------------------------------------------  IN:    Solution: 50 mL    Solution: 250 mL  Total IN: 300 mL    OUT:    Voided: 2100 mL  Total OUT: 2100 mL    Total NET: -1800 mL          Lab Data                        13.6   13.5  )-----------( 244      ( 08 Jul 2017 05:43 )             44.1     07-08    140  |  99  |  30<H>  ----------------------------<  119<H>  4.7   |  40<H>  |  1.30    Ca    9.5      08 Jul 2017 05:43  Phos  3.9     07-08  Mg     2.0     07-08    TPro  6.5  /  Alb  3.4  /  TBili  0.3  /  DBili  x   /  AST  25  /  ALT  33  /  AlkPhos  89  07-08    ABG - ( 07 Jul 2017 09:35 )  pH: 7.33  /  pCO2: 64    /  pO2: 52    / HCO3: 29    / Base Excess: 6.8   /  SaO2: 85                      Review of Systems	    LA  Objective     Physical Examination    head at  heart - s1s2  lungs - dec BS  abd - soft  extr - no gross edema  cn grossly int      Pertinent Lab findings & Imaging      Eliecer:  NO   Adequate UO     I&O's Detail    07 Jul 2017 07:01  -  08 Jul 2017 07:00  --------------------------------------------------------  IN:    Oral Fluid: 1430 mL    Solution: 50 mL    Solution: 250 mL  Total IN: 1730 mL    OUT:    Voided: 2250 mL  Total OUT: 2250 mL    Total NET: -520 mL      08 Jul 2017 07:01  -  09 Jul 2017 06:11  --------------------------------------------------------  IN:    Solution: 50 mL    Solution: 250 mL  Total IN: 300 mL    OUT:    Voided: 2100 mL  Total OUT: 2100 mL    Total NET: -1800 mL               Discussed with:     Cultures:	        Radiology

## 2017-07-09 NOTE — CHART NOTE - NSCHARTNOTEFT_GEN_A_CORE
spoke with RT about ABG report this am  pt appears to have compensated resp acidosis  pH in balance  cont with current recommendations and nocturnal use of BIPAP  will follow

## 2017-07-10 ENCOUNTER — TRANSCRIPTION ENCOUNTER (OUTPATIENT)
Age: 77
End: 2017-07-10

## 2017-07-10 VITALS — OXYGEN SATURATION: 93 %

## 2017-07-10 DIAGNOSIS — J44.9 CHRONIC OBSTRUCTIVE PULMONARY DISEASE, UNSPECIFIED: ICD-10-CM

## 2017-07-10 LAB
ANION GAP SERPL CALC-SCNC: 3 MMOL/L — LOW (ref 5–17)
BUN SERPL-MCNC: 21 MG/DL — SIGNIFICANT CHANGE UP (ref 7–23)
CALCIUM SERPL-MCNC: 9.2 MG/DL — SIGNIFICANT CHANGE UP (ref 8.5–10.1)
CHLORIDE SERPL-SCNC: 97 MMOL/L — SIGNIFICANT CHANGE UP (ref 96–108)
CO2 SERPL-SCNC: 41 MMOL/L — HIGH (ref 22–31)
CREAT SERPL-MCNC: 1.1 MG/DL — SIGNIFICANT CHANGE UP (ref 0.5–1.3)
GLUCOSE SERPL-MCNC: 149 MG/DL — HIGH (ref 70–99)
HCT VFR BLD CALC: 41.6 % — SIGNIFICANT CHANGE UP (ref 39–50)
HGB BLD-MCNC: 12.9 G/DL — LOW (ref 13–17)
MCHC RBC-ENTMCNC: 27.4 PG — SIGNIFICANT CHANGE UP (ref 27–34)
MCHC RBC-ENTMCNC: 30.9 GM/DL — LOW (ref 32–36)
MCV RBC AUTO: 88.6 FL — SIGNIFICANT CHANGE UP (ref 80–100)
PLATELET # BLD AUTO: 219 K/UL — SIGNIFICANT CHANGE UP (ref 150–400)
POTASSIUM SERPL-MCNC: 4 MMOL/L — SIGNIFICANT CHANGE UP (ref 3.5–5.3)
POTASSIUM SERPL-SCNC: 4 MMOL/L — SIGNIFICANT CHANGE UP (ref 3.5–5.3)
RBC # BLD: 4.7 M/UL — SIGNIFICANT CHANGE UP (ref 4.2–5.8)
RBC # FLD: 13.6 % — SIGNIFICANT CHANGE UP (ref 10.3–14.5)
SODIUM SERPL-SCNC: 141 MMOL/L — SIGNIFICANT CHANGE UP (ref 135–145)
WBC # BLD: 8.8 K/UL — SIGNIFICANT CHANGE UP (ref 3.8–10.5)
WBC # FLD AUTO: 8.8 K/UL — SIGNIFICANT CHANGE UP (ref 3.8–10.5)

## 2017-07-10 PROCEDURE — 87486 CHLMYD PNEUM DNA AMP PROBE: CPT

## 2017-07-10 PROCEDURE — 96372 THER/PROPH/DIAG INJ SC/IM: CPT | Mod: 59

## 2017-07-10 PROCEDURE — 83735 ASSAY OF MAGNESIUM: CPT

## 2017-07-10 PROCEDURE — 94640 AIRWAY INHALATION TREATMENT: CPT

## 2017-07-10 PROCEDURE — 99285 EMERGENCY DEPT VISIT HI MDM: CPT | Mod: 25

## 2017-07-10 PROCEDURE — 82550 ASSAY OF CK (CPK): CPT

## 2017-07-10 PROCEDURE — 85730 THROMBOPLASTIN TIME PARTIAL: CPT

## 2017-07-10 PROCEDURE — 87086 URINE CULTURE/COLONY COUNT: CPT

## 2017-07-10 PROCEDURE — 87449 NOS EACH ORGANISM AG IA: CPT

## 2017-07-10 PROCEDURE — 96375 TX/PRO/DX INJ NEW DRUG ADDON: CPT

## 2017-07-10 PROCEDURE — 85027 COMPLETE CBC AUTOMATED: CPT

## 2017-07-10 PROCEDURE — 96374 THER/PROPH/DIAG INJ IV PUSH: CPT

## 2017-07-10 PROCEDURE — 71250 CT THORAX DX C-: CPT

## 2017-07-10 PROCEDURE — 83880 ASSAY OF NATRIURETIC PEPTIDE: CPT

## 2017-07-10 PROCEDURE — 96376 TX/PRO/DX INJ SAME DRUG ADON: CPT

## 2017-07-10 PROCEDURE — 84100 ASSAY OF PHOSPHORUS: CPT

## 2017-07-10 PROCEDURE — 94660 CPAP INITIATION&MGMT: CPT

## 2017-07-10 PROCEDURE — 87040 BLOOD CULTURE FOR BACTERIA: CPT

## 2017-07-10 PROCEDURE — 83036 HEMOGLOBIN GLYCOSYLATED A1C: CPT

## 2017-07-10 PROCEDURE — 84484 ASSAY OF TROPONIN QUANT: CPT

## 2017-07-10 PROCEDURE — 82803 BLOOD GASES ANY COMBINATION: CPT

## 2017-07-10 PROCEDURE — 82248 BILIRUBIN DIRECT: CPT

## 2017-07-10 PROCEDURE — 94760 N-INVAS EAR/PLS OXIMETRY 1: CPT

## 2017-07-10 PROCEDURE — 81001 URINALYSIS AUTO W/SCOPE: CPT

## 2017-07-10 PROCEDURE — 87633 RESP VIRUS 12-25 TARGETS: CPT

## 2017-07-10 PROCEDURE — 71045 X-RAY EXAM CHEST 1 VIEW: CPT

## 2017-07-10 PROCEDURE — 80053 COMPREHEN METABOLIC PANEL: CPT

## 2017-07-10 PROCEDURE — 87581 M.PNEUMON DNA AMP PROBE: CPT

## 2017-07-10 PROCEDURE — 85610 PROTHROMBIN TIME: CPT

## 2017-07-10 PROCEDURE — 99239 HOSP IP/OBS DSCHRG MGMT >30: CPT

## 2017-07-10 PROCEDURE — 82553 CREATINE MB FRACTION: CPT

## 2017-07-10 PROCEDURE — 83605 ASSAY OF LACTIC ACID: CPT

## 2017-07-10 PROCEDURE — 80048 BASIC METABOLIC PNL TOTAL CA: CPT

## 2017-07-10 PROCEDURE — 87798 DETECT AGENT NOS DNA AMP: CPT

## 2017-07-10 PROCEDURE — 93005 ELECTROCARDIOGRAM TRACING: CPT

## 2017-07-10 PROCEDURE — 84145 PROCALCITONIN (PCT): CPT

## 2017-07-10 RX ORDER — BUDESONIDE AND FORMOTEROL FUMARATE DIHYDRATE 160; 4.5 UG/1; UG/1
2 AEROSOL RESPIRATORY (INHALATION)
Qty: 1 | Refills: 0 | OUTPATIENT
Start: 2017-07-10 | End: 2017-08-09

## 2017-07-10 RX ORDER — AZITHROMYCIN 500 MG/1
500 TABLET, FILM COATED ORAL EVERY 24 HOURS
Qty: 0 | Refills: 0 | Status: DISCONTINUED | OUTPATIENT
Start: 2017-07-10 | End: 2017-07-10

## 2017-07-10 RX ORDER — ALBUTEROL 90 UG/1
3 AEROSOL, METERED ORAL
Qty: 60 | Refills: 0 | OUTPATIENT
Start: 2017-07-10 | End: 2017-07-20

## 2017-07-10 RX ORDER — TIOTROPIUM BROMIDE 18 UG/1
1 CAPSULE ORAL; RESPIRATORY (INHALATION)
Qty: 1 | Refills: 0 | OUTPATIENT
Start: 2017-07-10 | End: 2017-08-09

## 2017-07-10 RX ADMIN — Medication 1: at 17:44

## 2017-07-10 RX ADMIN — TIOTROPIUM BROMIDE 1 CAPSULE(S): 18 CAPSULE ORAL; RESPIRATORY (INHALATION) at 06:09

## 2017-07-10 RX ADMIN — VALSARTAN 160 MILLIGRAM(S): 80 TABLET ORAL at 06:09

## 2017-07-10 RX ADMIN — Medication 6 UNIT(S): at 08:46

## 2017-07-10 RX ADMIN — AZITHROMYCIN 500 MILLIGRAM(S): 500 TABLET, FILM COATED ORAL at 12:17

## 2017-07-10 RX ADMIN — Medication 3: at 12:16

## 2017-07-10 RX ADMIN — Medication 6 UNIT(S): at 17:44

## 2017-07-10 RX ADMIN — Medication 20 MILLIGRAM(S): at 06:09

## 2017-07-10 RX ADMIN — Medication 6 UNIT(S): at 12:17

## 2017-07-10 RX ADMIN — ENOXAPARIN SODIUM 40 MILLIGRAM(S): 100 INJECTION SUBCUTANEOUS at 12:17

## 2017-07-10 RX ADMIN — BUDESONIDE AND FORMOTEROL FUMARATE DIHYDRATE 2 PUFF(S): 160; 4.5 AEROSOL RESPIRATORY (INHALATION) at 06:09

## 2017-07-10 RX ADMIN — CLOPIDOGREL BISULFATE 75 MILLIGRAM(S): 75 TABLET, FILM COATED ORAL at 12:17

## 2017-07-10 NOTE — DISCHARGE NOTE ADULT - PLAN OF CARE
resolved, prevent recurrence Your respiratory function was compromised from a COPD exacerbation. It is important to keep your COPD under control with the daily inhalers. Complete the course of steroids with the prescribed prednisone for the next 4 days starting tomorrow (you had your dose for today in the hospital). Use the prescribed Symbicort and Spiriva inhalers every day even if you feel well. If you feel badly, and have wheezing or shortness of breath you may use the rescue albuterol nebulizer prescribed. Follow up with the pulmonologist you had scheduled an appt with and discuss your COPD regimen, get pulmonary function testing, and would recommend a sleep study, as well, to evaluate if you require BIPAP at night. Your Hb A1c is 7.5%, which is acceptable for your age. Continue your home regimen of medications for diabetes and follow up with your PMD within a week. Continue your home regimen and follow up with your PMD or cardiologist. Continue flomax and follow up with your PMD. Continue your home regimen and follow up with your PMD.

## 2017-07-10 NOTE — PROGRESS NOTE ADULT - SUBJECTIVE AND OBJECTIVE BOX
Date/Time Patient Seen:  		  Referring MD:   Data Reviewed	       Patient is a 77y old  Male who presents with a chief complaint of respiratory distress (08 Jul 2017 03:41)  in bed  seen and examined  vs and meds reviewed  on prednisone and inhalers      Subjective/HPI     PAST MEDICAL & SURGICAL HISTORY:  Hypertension  Diabetes mellitus  COPD (chronic obstructive pulmonary disease)  Osteomyelitis  Asymptomatic Peripheral Vascular Disease  Dyslipidemia  CAD (Coronary Artery Disease)  Diabetes Mellitus, Type II  CABG (Coronary Artery Bypass Graft)        Medication list         MEDICATIONS  (STANDING):  insulin glargine Injectable (LANTUS) 12 Unit(s) SubCutaneous at bedtime  enoxaparin Injectable 40 milliGRAM(s) SubCutaneous daily  insulin lispro (HumaLOG) corrective regimen sliding scale   SubCutaneous three times a day before meals  dextrose 5%. 1000 milliLiter(s) (50 mL/Hr) IV Continuous <Continuous>  dextrose 50% Injectable 12.5 Gram(s) IV Push once  dextrose 50% Injectable 25 Gram(s) IV Push once  dextrose 50% Injectable 25 Gram(s) IV Push once  buDESOnide 160 MICROgram(s)/formoterol 4.5 MICROgram(s) Inhaler 2 Puff(s) Inhalation two times a day  tiotropium 18 MICROgram(s) Capsule 1 Capsule(s) Inhalation daily  clopidogrel Tablet 75 milliGRAM(s) Oral daily  tamsulosin 0.4 milliGRAM(s) Oral at bedtime  atorvastatin 40 milliGRAM(s) Oral at bedtime  valsartan 160 milliGRAM(s) Oral daily  azithromycin  IVPB 500 milliGRAM(s) IV Intermittent every 24 hours  insulin lispro Injectable (HumaLOG) 6 Unit(s) SubCutaneous three times a day before meals  predniSONE   Tablet 20 milliGRAM(s) Oral daily    MEDICATIONS  (PRN):  dextrose Gel 1 Dose(s) Oral once PRN Blood Glucose LESS THAN 70 milliGRAM(s)/deciliter  glucagon  Injectable 1 milliGRAM(s) IntraMuscular once PRN Glucose LESS THAN 70 milligrams/deciliter  ALBUTerol    0.083% 2.5 milliGRAM(s) Nebulizer every 6 hours PRN Shortness of Breath and/or Wheezing         Vitals log        ICU Vital Signs Last 24 Hrs  T(C): 37.1 (10 Jul 2017 04:00), Max: 37.3 (09 Jul 2017 12:27)  T(F): 98.7 (10 Jul 2017 04:00), Max: 99.1 (09 Jul 2017 12:27)  HR: 92 (10 Jul 2017 04:00) (87 - 112)  BP: 97/64 (10 Jul 2017 04:00) (93/56 - 108/72)  BP(mean): --  ABP: --  ABP(mean): --  RR: 17 (10 Jul 2017 04:00) (16 - 18)  SpO2: 95% (10 Jul 2017 04:00) (92% - 99%)           Input and Output:  I&O's Detail    08 Jul 2017 07:01  -  09 Jul 2017 07:00  --------------------------------------------------------  IN:    Solution: 50 mL    Solution: 250 mL  Total IN: 300 mL    OUT:    Voided: 2400 mL  Total OUT: 2400 mL    Total NET: -2100 mL      09 Jul 2017 07:01  -  10 Jul 2017 05:56  --------------------------------------------------------  IN:  Total IN: 0 mL    OUT:    Voided: 475 mL  Total OUT: 475 mL    Total NET: -475 mL          Lab Data                        13.7   10.2  )-----------( 224      ( 09 Jul 2017 06:42 )             43.6     07-09    140  |  96  |  27<H>  ----------------------------<  158<H>  4.1   |  42<H>  |  1.30    Ca    9.3      09 Jul 2017 06:42  Phos  3.4     07-09  Mg     1.9     07-09      ABG - ( 09 Jul 2017 06:57 )  pH: 7.36  /  pCO2: 70    /  pO2: 75    / HCO3: 34    / Base Excess: 12.1  /  SaO2: 95                      Review of Systems	      Objective     Physical Examination  obese  head at  heart - s1s2  lungs - dec BS  abd - soft  cn grossly int        Pertinent Lab findings & Imaging      Eliecer:  NO   Adequate UO     I&O's Detail    08 Jul 2017 07:01  -  09 Jul 2017 07:00  --------------------------------------------------------  IN:    Solution: 50 mL    Solution: 250 mL  Total IN: 300 mL    OUT:    Voided: 2400 mL  Total OUT: 2400 mL    Total NET: -2100 mL      09 Jul 2017 07:01  -  10 Jul 2017 05:56  --------------------------------------------------------  IN:  Total IN: 0 mL    OUT:    Voided: 475 mL  Total OUT: 475 mL    Total NET: -475 mL               Discussed with:     Cultures:	        Radiology

## 2017-07-10 NOTE — PROGRESS NOTE ADULT - PROBLEM SELECTOR PLAN 6
c/w lantus 12un QHS and humalog 6un TID premeal and HISS  -go back to home regimen on discharge as his Hb A1c was 7.5% which is good given his age.
c/w lantus 12un QHS and humalog 6un TID premeal and HISS  -monitor FSG, improving control now in 100-200s.
c/w lantus 12un QHS and humalog 6un TID premeal and HISS  -monitor FSG, which have been labile, as low as 81 and up to 272 -- if more consistent trend appears, will alter humalog/lantus accodingly
Continue home meds

## 2017-07-10 NOTE — PROGRESS NOTE ADULT - PROBLEM SELECTOR PROBLEM 2
COPD exacerbation
Acute respiratory failure with hypoxia

## 2017-07-10 NOTE — PROGRESS NOTE ADULT - ATTENDING COMMENTS
77M PMH COPD, HTN, HLD, DM2, CAD s/p 3V-CABG (2004), PVD s/p R LE stent on plavix, CKD, (baseline Cr=1.4), with acute on chronic hypercapnic respiratory failure due to COPD exacerbation, now markedly improved with BiPAP.    --normal mentation  --CAD, PVD, continue ASA, plavix, statin, ARB  --COPD exacerbation, continue pred 40, symbicort, spiriva, change duonebs to prn  continue Azithromycin and CTX  needs BiPAP qHS for chronic hypercapnic respiratory failure, decrease to 12/6 for comfort and to encourage compliance  will likely need home O2 and nocturnal BiPAP  --CKD at baseline  --type 2 DM, continue lantus, humalog and ISS  --stable for tele  --discussed plan with pt
77M PMH COPD (previously on Symbicort), HTN, HLD, DM2 (not on insulin), CAD s/p 3V-CABG (2004), PVD s/p R LE stent on plavix, CKD, (baseline Cr=1.4), BPH, and left femur fracture with OM s/p multiple surgeries who presents with acute on chronic hypercapnic respiratory failure due to COPD exacerbation, improved with BiPAP    - Resolved lethargy and hypercapnia  - HD stable, c/w clopidogrel, valsartan, atorvastatin  - BiPAP 22/8 qhs and prn day, supplemental oxygen with NC@2-3LPM when off BiPAP, goal SpO2>88%  - Change steroids to prednisone 40 mg qd + Duonebs q6h + Symbicort 160/4.5 2 puffs bid + Spiriva  - Ceftriaxone + Azithromycin for COPD exacerbation. There is a chronic left basilar opacity. Check CT Chest  - Diabetic diet as tolerated  - CKD stable, trend kidney function and lytes  - Blood cultures negative, UA negative, Urine legionella pending, UCx pending  - DVT ppx with Lovenox  - C/w Lantus 12 units qhs, increase Lispro 6 tid ac, c/w HISS  - No lines or cunha  - discussed with patient and wife, full code  - Stable for floors with remote tele/pulse ox
Note written by attending, see above.

## 2017-07-10 NOTE — DISCHARGE NOTE ADULT - MEDICATION SUMMARY - MEDICATIONS TO TAKE
I will START or STAY ON the medications listed below when I get home from the hospital:    predniSONE 20 mg oral tablet  -- 1 tab(s) by mouth once a day for 4 more days starting tomorrow (7/11/17)  -- Indication: For COPD exacerbation    tamsulosin 0.4 mg oral capsule  -- 1 cap(s) by mouth once a day (at bedtime)  -- Indication: For BPH (benign prostatic hyperplasia)    metFORMIN  -- 1000 milligram(s) by mouth 2 times a day  -- Indication: For Diabetes Mellitus, Type II    atorvastatin 40 mg oral tablet  -- 1 tab(s) by mouth once a day (at bedtime)  -- Indication: For CAD (Coronary Artery Disease)    Diovan  mg-12.5 mg oral tablet  -- 1 tab(s) by mouth once a day  -- Indication: For Hypertension    clopidogrel 75 mg oral tablet  -- 1 tab(s) by mouth once a day  -- Indication: For CAD (Coronary Artery Disease)    budesonide-formoterol 160 mcg-4.5 mcg/inh inhalation aerosol  -- 2 puff(s) inhaled 2 times a day  -- Indication: For COPD (chronic obstructive pulmonary disease)    albuterol 2.5 mg/3 mL (0.083%) inhalation solution  -- 3 milliliter(s) inhaled every 4 hours, As Needed for wheezing or shortness of breath  -- Indication: For COPD exacerbation    tiotropium 18 mcg inhalation capsule  -- 1 cap(s) inhaled once a day  -- Indication: For COPD (chronic obstructive pulmonary disease)    Multiple Vitamins with Minerals oral tablet  -- 1 tab(s) by mouth once a day  -- Indication: For vitamin supplement

## 2017-07-10 NOTE — PROGRESS NOTE ADULT - SUBJECTIVE AND OBJECTIVE BOX
Patient is a 77y old  Male who presents with a chief complaint of respiratory distress (10 Jul 2017 15:17)      INTERVAL HPI/OVERNIGHT EVENTS: much improved. No SOB. no wheezing.     MEDICATIONS  (STANDING):  insulin glargine Injectable (LANTUS) 12 Unit(s) SubCutaneous at bedtime  enoxaparin Injectable 40 milliGRAM(s) SubCutaneous daily  insulin lispro (HumaLOG) corrective regimen sliding scale   SubCutaneous three times a day before meals  dextrose 5%. 1000 milliLiter(s) (50 mL/Hr) IV Continuous <Continuous>  dextrose 50% Injectable 12.5 Gram(s) IV Push once  dextrose 50% Injectable 25 Gram(s) IV Push once  dextrose 50% Injectable 25 Gram(s) IV Push once  buDESOnide 160 MICROgram(s)/formoterol 4.5 MICROgram(s) Inhaler 2 Puff(s) Inhalation two times a day  tiotropium 18 MICROgram(s) Capsule 1 Capsule(s) Inhalation daily  clopidogrel Tablet 75 milliGRAM(s) Oral daily  tamsulosin 0.4 milliGRAM(s) Oral at bedtime  atorvastatin 40 milliGRAM(s) Oral at bedtime  valsartan 160 milliGRAM(s) Oral daily  insulin lispro Injectable (HumaLOG) 6 Unit(s) SubCutaneous three times a day before meals  predniSONE   Tablet 20 milliGRAM(s) Oral daily  azithromycin   Tablet 500 milliGRAM(s) Oral every 24 hours    MEDICATIONS  (PRN):  dextrose Gel 1 Dose(s) Oral once PRN Blood Glucose LESS THAN 70 milliGRAM(s)/deciliter  glucagon  Injectable 1 milliGRAM(s) IntraMuscular once PRN Glucose LESS THAN 70 milligrams/deciliter  ALBUTerol    0.083% 2.5 milliGRAM(s) Nebulizer every 6 hours PRN Shortness of Breath and/or Wheezing      Allergies    No Known Allergies    Intolerances    shellfish (Nausea)      REVIEW OF SYSTEMS:  CONSTITUTIONAL: No fever or chills  HEENT:  No headache, no sore throat  RESPIRATORY: No cough, wheezing, or shortness of breath  CARDIOVASCULAR: No chest pain, palpitations, or leg swelling  GASTROINTESTINAL: No nausea, vomiting, or diarrhea  GENITOURINARY: No dysuria, frequency, or hematuria  NEUROLOGICAL: no focal weakness or dizziness  SKIN:  No rashes or lesions   MUSCULOSKELETAL: no myalgias   PSYCHIATRIC: No depression or anxiety      Vital Signs Last 24 Hrs  T(C): 36.4 (10 Jul 2017 11:43), Max: 37.6 (10 Jul 2017 07:49)  T(F): 97.5 (10 Jul 2017 11:43), Max: 99.7 (10 Jul 2017 07:49)  HR: 93 (10 Jul 2017 11:43) (87 - 112)  BP: 99/63 (10 Jul 2017 11:43) (97/62 - 108/72)  BP(mean): --  RR: 17 (10 Jul 2017 11:43) (17 - 17)  SpO2: 93% (10 Jul 2017 12:33) (90% - 99%)    PHYSICAL EXAM:  GENERAL: NAD  HEENT:  EOMI, moist mucous membranes  CHEST/LUNG:  CTA b/l, no rales, wheezes, or rhonchi  HEART:  RRR, S1, S2  ABDOMEN:  BS+, soft, nontender, nondistended  EXTREMITIES: no edema or calf tenderness  SKIN:  no rash  NERVOUS SYSTEM: AA&Ox3    LABS:                        12.9   8.8   )-----------( 219      ( 10 Jul 2017 05:56 )             41.6     CBC Full  -  ( 10 Jul 2017 05:56 )  WBC Count : 8.8 K/uL  Hemoglobin : 12.9 g/dL  Hematocrit : 41.6 %  Platelet Count - Automated : 219 K/uL  Mean Cell Volume : 88.6 fl  Mean Cell Hemoglobin : 27.4 pg  Mean Cell Hemoglobin Concentration : 30.9 gm/dL  Auto Neutrophil # : x  Auto Lymphocyte # : x  Auto Monocyte # : x  Auto Eosinophil # : x  Auto Basophil # : x  Auto Neutrophil % : x  Auto Lymphocyte % : x  Auto Monocyte % : x  Auto Eosinophil % : x  Auto Basophil % : x    10 Jul 2017 05:56    141    |  97     |  21     ----------------------------<  149    4.0     |  41     |  1.10     Ca    9.2        10 Jul 2017 05:56          CAPILLARY BLOOD GLUCOSE  167 (10 Jul 2017 16:29)  278 (10 Jul 2017 11:43)  144 (10 Jul 2017 09:00)          RECENT CULTURES:        RESPIRATORY CULTURES:      cardiac Enzyme:  07-06 @ 14:40    CK:  158    CKMB:  --    CPK Mass Assay:  6.8    troponin i:  <.015    BNP: 168  07-06 @ 08:14    CK:  181    CKMB:  --    CPK Mass Assay:  6.1    troponin i:  <.015    BNP: 150        Anemia panel:          RADIOLOGY & ADDITIONAL TESTS:    Personally reviewed.     Consultant(s) Notes Reviewed:  [x] YES  [ ] NO    Care Discussed with [x] Consultants  [x] Patient  [x] Family  [ ]      [ ] Other; RN  DVT ppx lovenox

## 2017-07-10 NOTE — DISCHARGE NOTE ADULT - PATIENT PORTAL LINK FT
“You can access the [employer] Patient Portal, offered by [facility], by registering with the following website: [email or web address]”

## 2017-07-10 NOTE — DISCHARGE NOTE ADULT - SECONDARY DIAGNOSIS.
COPD exacerbation Diabetes Mellitus, Type II CAD (Coronary Artery Disease) BPH (benign prostatic hyperplasia) Essential hypertension Dyslipidemia

## 2017-07-10 NOTE — PROGRESS NOTE ADULT - PROBLEM SELECTOR PROBLEM 6
CAD (Coronary Artery Disease)
Diabetes Mellitus, Type II

## 2017-07-10 NOTE — DISCHARGE NOTE ADULT - MEDICATION SUMMARY - MEDICATIONS TO STOP TAKING
I will STOP taking the medications listed below when I get home from the hospital:    levoFLOXacin 500 mg oral tablet  -- 1 tab(s) by mouth every 24 hours    Advair Diskus 250 mcg-50 mcg inhalation powder  -- 1 puff(s) inhaled 2 times a day  -- Check with your doctor before becoming pregnant.  For inhalation only.  Obtain medical advice before taking any non-prescription drugs as some may affect the action of this medication.  Rinse mouth thoroughly after use.

## 2017-07-10 NOTE — PROGRESS NOTE ADULT - ASSESSMENT
77yo M w/PMHx of CAD s/p CABG, HTN, HLD, DM and COPD very recently admitted here from 12/9-12/13 with influenza and COPD exacerbation now presents with acute respiratory failure secondary to  LLL pneumonia and acute on chronic  COPD exacerbation secondary to PNA.  Given recent influenza and hospitalization being treated as post-influenza pneumonia/HCAP and  coverage for MRSA.
75yo M w/ PMH of CAD s/p CABG, HTN, HLD, DM2 and COPD (recently admitted to [facility] from 12/9-12/13 with influenza and COPD exacerbation) p/w SOB, wheezing a/w acute hypoxic & hypercarbic respiratory failure due to COPD exacerbation.
77M PMH COPD (previously on Symbicort), HTN, HLD, DM2 (not on insulin), CAD s/p 3V-CABG (2004), PVD s/p R LE stent on plavix, CKD, (baseline Cr=1.4), BPH, and left femur fracture with OM s/p multiple surgeries who presents with acute on chronic hypercapnic respiratory failure due to COPD exacerbation, improved with BiPAP.    Neuro: Stable, no pain issues.  Cardio: CAD with stent history-continue clopidogrel, atorvastatin.  HTN-continue valsartan.  Pulm: COPD exacerbation-continue Ceftriaxone, Azithromycin.  Continue Albuterol/Ipratropium Q6H, budesonide 160 mcg, methylprenisolone 40mg IVP BID.  ABG improved from yesterday. BiPAP 22/8 qhs and prn day, supplemental oxygen with NC@2-3LPM when off BiPAP, goal SpO2>88%.  Encourage BIPAP overnight.  GI: Stable.  Tolerating PO.  Endo: Elevated blood glucose, will increase premeal insulin to 6u TID.  Continue Lantus and ISS.  : Creatinine improved.  Continue tamsulosin.  ID: Continue Ceftriaxone and Azithromycin.    Skin: no lines, no cunha.   Dispo: Stable for transfer to telemetry with continuous pulse ox and nocturnal bipap.
77yo M w/ PMH of CAD s/p CABG, HTN, HLD, CKD (likely Stage II), DM2 and COPD (recently admitted to [facility] from 12/9-12/13 with influenza and COPD exacerbation) p/w SOB, wheezing a/w acute hypoxic & hypercarbic respiratory failure due to COPD exacerbation.
77yo M w/ PMH of CAD s/p CABG, HTN, HLD, DM2 and COPD (recently admitted to [facility] from 12/9-12/13 with influenza and COPD exacerbation) p/w SOB, wheezing a/w acute hypoxic & hypercarbic respiratory failure due to COPD exacerbation.

## 2017-07-10 NOTE — PROGRESS NOTE ADULT - PROBLEM SELECTOR PROBLEM 5
Dyslipidemia
CAD (Coronary Artery Disease)

## 2017-07-10 NOTE — PROGRESS NOTE ADULT - PROBLEM SELECTOR PROBLEM 1
Acute respiratory failure with hypoxia
Acute respiratory failure with hypoxia and hypercapnia
COPD (chronic obstructive pulmonary disease)

## 2017-07-10 NOTE — DISCHARGE NOTE ADULT - CARE PLAN
Principal Discharge DX:	Acute respiratory failure with hypoxia and hypercapnia  Goal:	resolved, prevent recurrence  Instructions for follow-up, activity and diet:	Your respiratory function was compromised from a COPD exacerbation. It is important to keep your COPD under control with the daily inhalers. Complete the course of steroids with the prescribed prednisone for the next 4 days starting tomorrow (you had your dose for today in the hospital). Use the prescribed Symbicort and Spiriva inhalers every day even if you feel well. If you feel badly, and have wheezing or shortness of breath you may use the rescue albuterol nebulizer prescribed. Follow up with the pulmonologist you had scheduled an appt with and discuss your COPD regimen, get pulmonary function testing, and would recommend a sleep study, as well, to evaluate if you require BIPAP at night.  Secondary Diagnosis:	COPD exacerbation  Instructions for follow-up, activity and diet:	Your respiratory function was compromised from a COPD exacerbation. It is important to keep your COPD under control with the daily inhalers. Complete the course of steroids with the prescribed prednisone for the next 4 days starting tomorrow (you had your dose for today in the hospital). Use the prescribed Symbicort and Spiriva inhalers every day even if you feel well. If you feel badly, and have wheezing or shortness of breath you may use the rescue albuterol nebulizer prescribed. Follow up with the pulmonologist you had scheduled an appt with and discuss your COPD regimen, get pulmonary function testing, and would recommend a sleep study, as well, to evaluate if you require BIPAP at night.  Secondary Diagnosis:	Diabetes Mellitus, Type II  Instructions for follow-up, activity and diet:	Your Hb A1c is 7.5%, which is acceptable for your age. Continue your home regimen of medications for diabetes and follow up with your PMD within a week.  Secondary Diagnosis:	CAD (Coronary Artery Disease)  Instructions for follow-up, activity and diet:	Continue your home regimen and follow up with your PMD or cardiologist.  Secondary Diagnosis:	BPH (benign prostatic hyperplasia)  Instructions for follow-up, activity and diet:	Continue flomax and follow up with your PMD.  Secondary Diagnosis:	Essential hypertension  Instructions for follow-up, activity and diet:	Continue your home regimen and follow up with your PMD.  Secondary Diagnosis:	Dyslipidemia  Instructions for follow-up, activity and diet:	Continue your home regimen and follow up with your PMD.

## 2017-07-10 NOTE — DISCHARGE NOTE ADULT - MEDICATION SUMMARY - MEDICATIONS TO CHANGE
I will SWITCH the dose or number of times a day I take the medications listed below when I get home from the hospital:    predniSONE 10 mg oral tablet  -- take 2 tabs daily for 2 days, then take 1 tab daily for 2 days

## 2017-07-10 NOTE — PROGRESS NOTE ADULT - PROBLEM SELECTOR PROBLEM 7
Diabetes Mellitus, Type II
BPH (benign prostatic hyperplasia)

## 2017-07-10 NOTE — PROGRESS NOTE ADULT - PROBLEM SELECTOR PLAN 2
-c/w prednisone, nebs PRN  -c/w Spiriva and Symbicort  -check RVP as unclear pt's trigger for COPD exacerbation
-c/w prednisone, nebs PRN  -c/w Spiriva and Symbicort  -unclear pt's trigger for COPD exacerbation  -RVP negative
-c/w prednisone, nebs PRN (decreased prednisone to 20mg po daily today for 5 days as per pulm recs)  -c/w Spiriva and Symbicort  -unclear pt's trigger for COPD exacerbation  -RVP negative
cont spiriva, symbicort, prednisone tapered to 20 mg daily  overall better  ABG shows compensated CO2 retention, consistent with COPD  will need assessment for COURTNEY OHS in the sleep lab  no need for BIPAP at home at present, will need PSG first  will need to assess for Home 02 need, assess exertional o2 sat on room air
IV solumedrol, taper as tolerated, Nebs, O2 supplementation.

## 2017-07-10 NOTE — DISCHARGE NOTE ADULT - NSTOBACCOHOTLINE_GEN_A_CS
[facility] Smokers Quitline ([phone]) [facility] Smokers Quitline ([phone])

## 2017-07-10 NOTE — DISCHARGE NOTE ADULT - HOSPITAL COURSE
Hospital Course:  75yo M w/ PMH of CAD s/p CABG, HTN, HLD, DM2 and COPD (recently admitted to [facility] from 12/9-12/13 with influenza and COPD exacerbation) p/w SOB, wheezing a/w acute hypoxic & hypercarbic respiratory failure due to COPD exacerbation. Pt was placed on BIPAP and monitored in the ICU. Started to improve without need for intubation. Initially started on CAP coverage but CT Chest showed no signs of PNA, pt had no cough, afebrile, negative procalcitonin, so discontinued rocephin. Had 5 days of azithromycin to potentially help with inflammation with COPD exacerbation. Transitioned from Solumedrol to prednisone and will complete 4 more days of prednisone 20mg po daily as per pulm recs (Dr. Combs). Pt's last ABG showed compensated hypercarbia, normal pH. Pt's hypoxia resolved and was 93% on RA at rest, 90% on RA with ambulation on day of discharge. Pt had mild MERRILL on admission, which resolved with encouraged increased po intake of fluids. RVP negative. Pt's symptoms resolved and he feels approximately at baseline. As per pulm, no need for BIPAP on discharge - pt will have outpt PFTs and sleep study with a pulmonologist the pt has made an appt to decide if would benefit from BIPAP in the future.   Pt discharged to home. Pt making appts with his PMD and pulmonologist.     Time spent: 45min    Called PMD Dr. Sarah Avila's office and left message regarding the discharge. Hospital Course:  75yo M w/ PMH of CAD s/p CABG, HTN, HLD, DM2 and COPD (recently admitted to [facility] from 12/9-12/13 with influenza and COPD exacerbation) p/w SOB, wheezing a/w acute hypoxic & hypercarbic respiratory failure due to COPD exacerbation. Pt was placed on BIPAP and monitored in the ICU. Started to improve without need for intubation. Initially started on CAP coverage but CT Chest showed no signs of PNA, pt had no cough, afebrile, negative procalcitonin, so discontinued rocephin. Had 5 days of azithromycin to potentially help with inflammation with COPD exacerbation. Transitioned from Solumedrol to prednisone and will complete 4 more days of prednisone 20mg po daily as per pulm recs (Dr. Combs). Pt's last ABG showed compensated hypercarbia, normal pH. Pt's hypoxia resolved and was 93% on RA at rest, 90% on RA with ambulation on day of discharge. Pt had mild MERRILL on admission, which resolved with encouraged increased po intake of fluids. RVP negative. BCx, UCx, legionella antigen negative. Pt's symptoms resolved and he feels approximately at baseline. As per pulm, no need for BIPAP on discharge - pt will have outpt PFTs and sleep study with a pulmonologist the pt has made an appt to decide if would benefit from BIPAP in the future.   Pt discharged to home. Pt making appts with his PMD and pulmonologist.     Time spent: 45min    Called PMD Dr. Sarah Avila's office and left message regarding the discharge. Hospital Course:  75yo M w/ PMH of CAD s/p CABG, HTN, HLD, CKD (likely Stage 2), DM2 and COPD (recently admitted to [facility] from 12/9-12/13 with influenza and COPD exacerbation) p/w SOB, wheezing a/w acute hypoxic & hypercarbic respiratory failure due to COPD exacerbation. Pt was placed on BIPAP and monitored in the ICU. Started to improve without need for intubation. Initially started on CAP coverage but CT Chest showed no signs of PNA, pt had no cough, afebrile, negative procalcitonin, so discontinued rocephin. Had 5 days of azithromycin to potentially help with inflammation with COPD exacerbation. Transitioned from Solumedrol to prednisone and will complete 4 more days of prednisone 20mg po daily as per pulm recs (Dr. Combs). Pt's last ABG showed compensated hypercarbia, normal pH. Pt's hypoxia resolved and was 93% on RA at rest, 90% on RA with ambulation on day of discharge. Pt had mild MERRILL on admission, which resolved with encouraged increased po intake of fluids. RVP negative. BCx, UCx, legionella antigen negative. Pt's symptoms resolved and he feels approximately at baseline. As per pulm, no need for BIPAP on discharge - pt will have outpt PFTs and sleep study with a pulmonologist the pt has made an appt to decide if would benefit from BIPAP in the future.   Pt discharged to home. Pt making appts with his PMD and pulmonologist.     Time spent: 45min    Called PMD Dr. Sarah Avila's office and left message regarding the discharge.

## 2017-07-10 NOTE — PROGRESS NOTE ADULT - PROBLEM SELECTOR PLAN 1
Off BIPAP. On 2L O2 via NC. Check ABG, CXR. Nebs PRN. Treatment for PNA.
-due to COPD exacerbation  -symptomatically improving  -wean off NC O2 as tolerated  -ABG in AM showed good SpO2 and pO2 supposedly off oxygen, but after trying to remove nasal cannula this morning, pt's SpO2 dropped to 82% on RA at rest.   -CT Chest benign not showing acute pathology, negative procalcitonin; discontinued rocephin. Will continue azithromycin to complete a 5-day course as the anti-inflammatory effects may help with COPD exacerbation.   -pt will likely need home O2 and possibly nocturnal BIPAP -- pCO2 of 70 on AM ABG, but has normal pH showing compensated hypercarbia.   -c/w steroids, Spiriva, Symbicort, nebs PRN
-due to COPD exacerbation  -symptomatically improving  -weaned off NC now satting >90% on RA with walking and at rest.  -CT Chest benign not showing acute pathology, negative procalcitonin; discontinued rocephin. Will continue azithromycin to complete a 5-day course as the anti-inflammatory effects may help with COPD exacerbation.   -eval for possible BIPAP to be done as outpt as per pulm recs  -c/w steroids, Spiriva, Symbicort, nebs PRN
-pt no longer requiring BIPAP during the day   -symptomatically much improved  -wean off NC O2 as tolerated  -recheck ABG in AM  -CT Chest benign not showing acute pathology, recommend de-escalating Abx to just azithromycin  -Check RVP, procalcitonin
doubt PNA  no evidence of PNA on ct chest  cont NEBS and Steroids and Inhalers for COPD ex  ABG noted - co2 retention in acute COPD ex  cont NIPPV nightly  o2 during the day  will need to assess for home o2 need, will need to assess sat on exertion  macrolide ABX for 5 days, cephalosporin for 7 days  will follow  overall appears to be better  discussed plan of care with pt  will need PFT as outpatient
will need outpatient PFT and sleep study  pt's wife made an appt with pulmonary specialist in [facility]  will follow up as outpatient  discussed with pt

## 2017-07-13 DIAGNOSIS — I12.9 HYPERTENSIVE CHRONIC KIDNEY DISEASE WITH STAGE 1 THROUGH STAGE 4 CHRONIC KIDNEY DISEASE, OR UNSPECIFIED CHRONIC KIDNEY DISEASE: ICD-10-CM

## 2017-07-13 DIAGNOSIS — Z79.84 LONG TERM (CURRENT) USE OF ORAL HYPOGLYCEMIC DRUGS: ICD-10-CM

## 2017-07-13 DIAGNOSIS — E11.22 TYPE 2 DIABETES MELLITUS WITH DIABETIC CHRONIC KIDNEY DISEASE: ICD-10-CM

## 2017-07-13 DIAGNOSIS — N18.2 CHRONIC KIDNEY DISEASE, STAGE 2 (MILD): ICD-10-CM

## 2017-07-13 DIAGNOSIS — Z79.51 LONG TERM (CURRENT) USE OF INHALED STEROIDS: ICD-10-CM

## 2017-07-13 DIAGNOSIS — N17.9 ACUTE KIDNEY FAILURE, UNSPECIFIED: ICD-10-CM

## 2017-07-13 DIAGNOSIS — E87.2 ACIDOSIS: ICD-10-CM

## 2017-07-13 DIAGNOSIS — J96.01 ACUTE RESPIRATORY FAILURE WITH HYPOXIA: ICD-10-CM

## 2017-07-13 DIAGNOSIS — Z79.01 LONG TERM (CURRENT) USE OF ANTICOAGULANTS: ICD-10-CM

## 2017-07-13 DIAGNOSIS — J96.21 ACUTE AND CHRONIC RESPIRATORY FAILURE WITH HYPOXIA: ICD-10-CM

## 2017-07-13 DIAGNOSIS — J96.22 ACUTE AND CHRONIC RESPIRATORY FAILURE WITH HYPERCAPNIA: ICD-10-CM

## 2017-07-13 DIAGNOSIS — I73.9 PERIPHERAL VASCULAR DISEASE, UNSPECIFIED: ICD-10-CM

## 2017-07-13 DIAGNOSIS — J96.02 ACUTE RESPIRATORY FAILURE WITH HYPERCAPNIA: ICD-10-CM

## 2017-07-13 DIAGNOSIS — Z95.828 PRESENCE OF OTHER VASCULAR IMPLANTS AND GRAFTS: ICD-10-CM

## 2017-07-13 DIAGNOSIS — J44.1 CHRONIC OBSTRUCTIVE PULMONARY DISEASE WITH (ACUTE) EXACERBATION: ICD-10-CM

## 2017-07-13 DIAGNOSIS — Z87.891 PERSONAL HISTORY OF NICOTINE DEPENDENCE: ICD-10-CM

## 2017-07-13 DIAGNOSIS — E78.5 HYPERLIPIDEMIA, UNSPECIFIED: ICD-10-CM

## 2017-07-13 DIAGNOSIS — I25.10 ATHEROSCLEROTIC HEART DISEASE OF NATIVE CORONARY ARTERY WITHOUT ANGINA PECTORIS: ICD-10-CM

## 2017-07-13 DIAGNOSIS — N40.0 BENIGN PROSTATIC HYPERPLASIA WITHOUT LOWER URINARY TRACT SYMPTOMS: ICD-10-CM

## 2017-07-13 DIAGNOSIS — Z95.1 PRESENCE OF AORTOCORONARY BYPASS GRAFT: ICD-10-CM

## 2017-07-24 ENCOUNTER — APPOINTMENT (OUTPATIENT)
Dept: VASCULAR SURGERY | Facility: CLINIC | Age: 77
End: 2017-07-24
Payer: MEDICARE

## 2017-07-24 ENCOUNTER — APPOINTMENT (OUTPATIENT)
Dept: VASCULAR SURGERY | Facility: CLINIC | Age: 77
End: 2017-07-24

## 2017-07-24 VITALS
BODY MASS INDEX: 30.1 KG/M2 | HEART RATE: 70 BPM | SYSTOLIC BLOOD PRESSURE: 124 MMHG | WEIGHT: 215 LBS | TEMPERATURE: 98 F | DIASTOLIC BLOOD PRESSURE: 60 MMHG | HEIGHT: 71 IN

## 2017-07-24 DIAGNOSIS — Z87.891 PERSONAL HISTORY OF NICOTINE DEPENDENCE: ICD-10-CM

## 2017-07-24 DIAGNOSIS — F15.90 OTHER STIMULANT USE, UNSPECIFIED, UNCOMPLICATED: ICD-10-CM

## 2017-07-24 DIAGNOSIS — Q78.2 OSTEOPETROSIS: ICD-10-CM

## 2017-07-24 PROCEDURE — 93926 LOWER EXTREMITY STUDY: CPT | Mod: RT

## 2017-07-24 PROCEDURE — 93924 LWR XTR VASC STDY BILAT: CPT

## 2017-07-24 RX ORDER — PSYLLIUM HUSK 0.4 G
CAPSULE ORAL
Refills: 0 | Status: ACTIVE | COMMUNITY

## 2017-07-24 RX ORDER — UBIDECARENONE 200 MG
CAPSULE ORAL
Refills: 0 | Status: ACTIVE | COMMUNITY

## 2017-07-24 RX ORDER — VITAMIN B COMPLEX
CAPSULE ORAL
Refills: 0 | Status: ACTIVE | COMMUNITY

## 2017-07-24 RX ORDER — MULTIVITAMIN
TABLET ORAL
Refills: 0 | Status: ACTIVE | COMMUNITY

## 2017-08-08 ENCOUNTER — FORM ENCOUNTER (OUTPATIENT)
Age: 77
End: 2017-08-08

## 2017-08-09 ENCOUNTER — APPOINTMENT (OUTPATIENT)
Age: 77
End: 2017-08-09
Payer: MEDICARE

## 2017-08-09 PROCEDURE — 37223Z: CUSTOM | Mod: 50,59

## 2017-08-09 PROCEDURE — 75625 CONTRAST EXAM ABDOMINL AORTA: CPT

## 2017-08-09 PROCEDURE — 37221Z: CUSTOM | Mod: 50

## 2017-08-28 ENCOUNTER — APPOINTMENT (OUTPATIENT)
Dept: VASCULAR SURGERY | Facility: CLINIC | Age: 77
End: 2017-08-28

## 2017-09-25 ENCOUNTER — APPOINTMENT (OUTPATIENT)
Dept: VASCULAR SURGERY | Facility: CLINIC | Age: 77
End: 2017-09-25
Payer: MEDICARE

## 2017-09-25 VITALS
TEMPERATURE: 98.6 F | HEART RATE: 112 BPM | HEIGHT: 60 IN | RESPIRATION RATE: 16 BRPM | SYSTOLIC BLOOD PRESSURE: 137 MMHG | BODY MASS INDEX: 42.21 KG/M2 | DIASTOLIC BLOOD PRESSURE: 84 MMHG | WEIGHT: 215 LBS

## 2017-09-25 PROCEDURE — 93924 LWR XTR VASC STDY BILAT: CPT

## 2017-09-25 PROCEDURE — 99214 OFFICE O/P EST MOD 30 MIN: CPT | Mod: 25

## 2017-10-30 ENCOUNTER — APPOINTMENT (OUTPATIENT)
Dept: VASCULAR SURGERY | Facility: CLINIC | Age: 77
End: 2017-10-30
Payer: MEDICARE

## 2017-10-30 PROCEDURE — 93926 LOWER EXTREMITY STUDY: CPT | Mod: RT

## 2017-10-30 PROCEDURE — 99214 OFFICE O/P EST MOD 30 MIN: CPT

## 2017-10-30 PROCEDURE — 93979 VASCULAR STUDY: CPT

## 2017-12-18 ENCOUNTER — APPOINTMENT (OUTPATIENT)
Dept: VASCULAR SURGERY | Facility: CLINIC | Age: 77
End: 2017-12-18

## 2017-12-18 ENCOUNTER — INPATIENT (INPATIENT)
Facility: HOSPITAL | Age: 77
LOS: 2 days | Discharge: HOME | DRG: 683 | End: 2017-12-21
Attending: HOSPITALIST | Admitting: HOSPITALIST
Payer: COMMERCIAL

## 2017-12-18 VITALS
HEIGHT: 71 IN | HEART RATE: 132 BPM | DIASTOLIC BLOOD PRESSURE: 81 MMHG | OXYGEN SATURATION: 78 % | WEIGHT: 199.96 LBS | TEMPERATURE: 98 F | SYSTOLIC BLOOD PRESSURE: 119 MMHG | RESPIRATION RATE: 24 BRPM

## 2017-12-18 DIAGNOSIS — Z83.3 FAMILY HISTORY OF DIABETES MELLITUS: ICD-10-CM

## 2017-12-18 DIAGNOSIS — J44.9 CHRONIC OBSTRUCTIVE PULMONARY DISEASE, UNSPECIFIED: ICD-10-CM

## 2017-12-18 DIAGNOSIS — E11.9 TYPE 2 DIABETES MELLITUS WITHOUT COMPLICATIONS: ICD-10-CM

## 2017-12-18 DIAGNOSIS — Z29.9 ENCOUNTER FOR PROPHYLACTIC MEASURES, UNSPECIFIED: ICD-10-CM

## 2017-12-18 DIAGNOSIS — I25.10 ATHEROSCLEROTIC HEART DISEASE OF NATIVE CORONARY ARTERY WITHOUT ANGINA PECTORIS: ICD-10-CM

## 2017-12-18 DIAGNOSIS — E78.5 HYPERLIPIDEMIA, UNSPECIFIED: ICD-10-CM

## 2017-12-18 DIAGNOSIS — I10 ESSENTIAL (PRIMARY) HYPERTENSION: ICD-10-CM

## 2017-12-18 DIAGNOSIS — N17.9 ACUTE KIDNEY FAILURE, UNSPECIFIED: ICD-10-CM

## 2017-12-18 LAB
ALBUMIN SERPL ELPH-MCNC: 3.9 G/DL — SIGNIFICANT CHANGE UP (ref 3.3–5)
ALP SERPL-CCNC: 91 U/L — SIGNIFICANT CHANGE UP (ref 40–120)
ALT FLD-CCNC: 28 U/L — SIGNIFICANT CHANGE UP (ref 12–78)
ANION GAP SERPL CALC-SCNC: 8 MMOL/L — SIGNIFICANT CHANGE UP (ref 5–17)
ANISOCYTOSIS BLD QL: SLIGHT — SIGNIFICANT CHANGE UP
APTT BLD: 33.5 SEC — SIGNIFICANT CHANGE UP (ref 27.5–37.4)
AST SERPL-CCNC: 21 U/L — SIGNIFICANT CHANGE UP (ref 15–37)
BASOPHILS NFR BLD AUTO: 2 % — SIGNIFICANT CHANGE UP (ref 0–2)
BILIRUB SERPL-MCNC: 0.4 MG/DL — SIGNIFICANT CHANGE UP (ref 0.2–1.2)
BUN SERPL-MCNC: 21 MG/DL — SIGNIFICANT CHANGE UP (ref 7–23)
CALCIUM SERPL-MCNC: 9.9 MG/DL — SIGNIFICANT CHANGE UP (ref 8.5–10.1)
CHLORIDE SERPL-SCNC: 104 MMOL/L — SIGNIFICANT CHANGE UP (ref 96–108)
CO2 SERPL-SCNC: 28 MMOL/L — SIGNIFICANT CHANGE UP (ref 22–31)
CREAT SERPL-MCNC: 1.5 MG/DL — HIGH (ref 0.5–1.3)
DACRYOCYTES BLD QL SMEAR: SLIGHT — SIGNIFICANT CHANGE UP
ELLIPTOCYTES BLD QL SMEAR: SLIGHT — SIGNIFICANT CHANGE UP
EOSINOPHIL NFR BLD AUTO: 16 % — HIGH (ref 0–6)
GLUCOSE SERPL-MCNC: 204 MG/DL — HIGH (ref 70–99)
HCT VFR BLD CALC: 45 % — SIGNIFICANT CHANGE UP (ref 39–50)
HGB BLD-MCNC: 14.1 G/DL — SIGNIFICANT CHANGE UP (ref 13–17)
INR BLD: 1.04 RATIO — SIGNIFICANT CHANGE UP (ref 0.88–1.16)
LACTATE SERPL-SCNC: 1.4 MMOL/L — SIGNIFICANT CHANGE UP (ref 0.7–2)
LACTATE SERPL-SCNC: 2 MMOL/L — SIGNIFICANT CHANGE UP (ref 0.7–2)
LACTATE SERPL-SCNC: 2.2 MMOL/L — HIGH (ref 0.7–2)
LACTATE SERPL-SCNC: 3 MMOL/L — HIGH (ref 0.7–2)
LYMPHOCYTES # BLD AUTO: 13 % — SIGNIFICANT CHANGE UP (ref 13–44)
MCHC RBC-ENTMCNC: 27.5 PG — SIGNIFICANT CHANGE UP (ref 27–34)
MCHC RBC-ENTMCNC: 31.2 GM/DL — LOW (ref 32–36)
MCV RBC AUTO: 87.9 FL — SIGNIFICANT CHANGE UP (ref 80–100)
MICROCYTES BLD QL: SLIGHT — SIGNIFICANT CHANGE UP
MONOCYTES NFR BLD AUTO: 9 % — SIGNIFICANT CHANGE UP (ref 1–9)
NEUTROPHILS NFR BLD AUTO: 56 % — SIGNIFICANT CHANGE UP (ref 43–77)
NEUTS BAND # BLD: 3 % — SIGNIFICANT CHANGE UP (ref 0–8)
PLAT MORPH BLD: NORMAL — SIGNIFICANT CHANGE UP
PLATELET # BLD AUTO: 284 K/UL — SIGNIFICANT CHANGE UP (ref 150–400)
POIKILOCYTOSIS BLD QL AUTO: SLIGHT — SIGNIFICANT CHANGE UP
POLYCHROMASIA BLD QL SMEAR: SLIGHT — SIGNIFICANT CHANGE UP
POTASSIUM SERPL-MCNC: 4.5 MMOL/L — SIGNIFICANT CHANGE UP (ref 3.5–5.3)
POTASSIUM SERPL-SCNC: 4.5 MMOL/L — SIGNIFICANT CHANGE UP (ref 3.5–5.3)
PROT SERPL-MCNC: 7.3 G/DL — SIGNIFICANT CHANGE UP (ref 6–8.3)
PROTHROM AB SERPL-ACNC: 11.3 SEC — SIGNIFICANT CHANGE UP (ref 9.8–12.7)
RAPID RVP RESULT: SIGNIFICANT CHANGE UP
RBC # BLD: 5.12 M/UL — SIGNIFICANT CHANGE UP (ref 4.2–5.8)
RBC # FLD: 13.7 % — SIGNIFICANT CHANGE UP (ref 10.3–14.5)
RBC BLD AUTO: ABNORMAL
SCHISTOCYTES BLD QL AUTO: SLIGHT — SIGNIFICANT CHANGE UP
SODIUM SERPL-SCNC: 140 MMOL/L — SIGNIFICANT CHANGE UP (ref 135–145)
VARIANT LYMPHS # BLD: 1 % — SIGNIFICANT CHANGE UP (ref 0–6)
WBC # BLD: 9.2 K/UL — SIGNIFICANT CHANGE UP (ref 3.8–10.5)
WBC # FLD AUTO: 9.2 K/UL — SIGNIFICANT CHANGE UP (ref 3.8–10.5)

## 2017-12-18 PROCEDURE — 93970 EXTREMITY STUDY: CPT | Mod: 26

## 2017-12-18 PROCEDURE — 71010: CPT | Mod: 26

## 2017-12-18 PROCEDURE — 93010 ELECTROCARDIOGRAM REPORT: CPT

## 2017-12-18 PROCEDURE — 99285 EMERGENCY DEPT VISIT HI MDM: CPT

## 2017-12-18 PROCEDURE — 99223 1ST HOSP IP/OBS HIGH 75: CPT | Mod: AI,GC

## 2017-12-18 RX ORDER — GLUCAGON INJECTION, SOLUTION 0.5 MG/.1ML
1 INJECTION, SOLUTION SUBCUTANEOUS ONCE
Qty: 0 | Refills: 0 | Status: DISCONTINUED | OUTPATIENT
Start: 2017-12-18 | End: 2017-12-21

## 2017-12-18 RX ORDER — CLOPIDOGREL BISULFATE 75 MG/1
75 TABLET, FILM COATED ORAL DAILY
Qty: 0 | Refills: 0 | Status: DISCONTINUED | OUTPATIENT
Start: 2017-12-18 | End: 2017-12-21

## 2017-12-18 RX ORDER — DEXTROSE 50 % IN WATER 50 %
12.5 SYRINGE (ML) INTRAVENOUS ONCE
Qty: 0 | Refills: 0 | Status: DISCONTINUED | OUTPATIENT
Start: 2017-12-18 | End: 2017-12-21

## 2017-12-18 RX ORDER — AZITHROMYCIN 500 MG/1
500 TABLET, FILM COATED ORAL EVERY 24 HOURS
Qty: 0 | Refills: 0 | Status: DISCONTINUED | OUTPATIENT
Start: 2017-12-19 | End: 2017-12-19

## 2017-12-18 RX ORDER — HEPARIN SODIUM 5000 [USP'U]/ML
5000 INJECTION INTRAVENOUS; SUBCUTANEOUS EVERY 12 HOURS
Qty: 0 | Refills: 0 | Status: DISCONTINUED | OUTPATIENT
Start: 2017-12-18 | End: 2017-12-21

## 2017-12-18 RX ORDER — IPRATROPIUM/ALBUTEROL SULFATE 18-103MCG
3 AEROSOL WITH ADAPTER (GRAM) INHALATION
Qty: 0 | Refills: 0 | Status: DISCONTINUED | OUTPATIENT
Start: 2017-12-18 | End: 2017-12-19

## 2017-12-18 RX ORDER — DEXTROSE 50 % IN WATER 50 %
25 SYRINGE (ML) INTRAVENOUS ONCE
Qty: 0 | Refills: 0 | Status: DISCONTINUED | OUTPATIENT
Start: 2017-12-18 | End: 2017-12-21

## 2017-12-18 RX ORDER — SODIUM CHLORIDE 9 MG/ML
1000 INJECTION INTRAMUSCULAR; INTRAVENOUS; SUBCUTANEOUS
Qty: 0 | Refills: 0 | Status: COMPLETED | OUTPATIENT
Start: 2017-12-18 | End: 2017-12-18

## 2017-12-18 RX ORDER — IPRATROPIUM/ALBUTEROL SULFATE 18-103MCG
3 AEROSOL WITH ADAPTER (GRAM) INHALATION ONCE
Qty: 0 | Refills: 0 | Status: COMPLETED | OUTPATIENT
Start: 2017-12-18 | End: 2017-12-18

## 2017-12-18 RX ORDER — ASPIRIN/CALCIUM CARB/MAGNESIUM 324 MG
81 TABLET ORAL DAILY
Qty: 0 | Refills: 0 | Status: DISCONTINUED | OUTPATIENT
Start: 2017-12-18 | End: 2017-12-18

## 2017-12-18 RX ORDER — IPRATROPIUM/ALBUTEROL SULFATE 18-103MCG
3 AEROSOL WITH ADAPTER (GRAM) INHALATION EVERY 4 HOURS
Qty: 0 | Refills: 0 | Status: DISCONTINUED | OUTPATIENT
Start: 2017-12-18 | End: 2017-12-21

## 2017-12-18 RX ORDER — HYDROCHLOROTHIAZIDE 25 MG
12.5 TABLET ORAL DAILY
Qty: 0 | Refills: 0 | Status: DISCONTINUED | OUTPATIENT
Start: 2017-12-18 | End: 2017-12-18

## 2017-12-18 RX ORDER — BUDESONIDE, MICRONIZED 100 %
0.5 POWDER (GRAM) MISCELLANEOUS
Qty: 0 | Refills: 0 | Status: DISCONTINUED | OUTPATIENT
Start: 2017-12-18 | End: 2017-12-21

## 2017-12-18 RX ORDER — VALSARTAN 80 MG/1
160 TABLET ORAL DAILY
Qty: 0 | Refills: 0 | Status: DISCONTINUED | OUTPATIENT
Start: 2017-12-18 | End: 2017-12-18

## 2017-12-18 RX ORDER — ASPIRIN/CALCIUM CARB/MAGNESIUM 324 MG
81 TABLET ORAL DAILY
Qty: 0 | Refills: 0 | Status: DISCONTINUED | OUTPATIENT
Start: 2017-12-18 | End: 2017-12-21

## 2017-12-18 RX ORDER — IPRATROPIUM/ALBUTEROL SULFATE 18-103MCG
3 AEROSOL WITH ADAPTER (GRAM) INHALATION EVERY 6 HOURS
Qty: 0 | Refills: 0 | Status: DISCONTINUED | OUTPATIENT
Start: 2017-12-18 | End: 2017-12-18

## 2017-12-18 RX ORDER — ATORVASTATIN CALCIUM 80 MG/1
40 TABLET, FILM COATED ORAL AT BEDTIME
Qty: 0 | Refills: 0 | Status: DISCONTINUED | OUTPATIENT
Start: 2017-12-18 | End: 2017-12-21

## 2017-12-18 RX ORDER — AMLODIPINE BESYLATE 2.5 MG/1
5 TABLET ORAL DAILY
Qty: 0 | Refills: 0 | Status: DISCONTINUED | OUTPATIENT
Start: 2017-12-18 | End: 2017-12-19

## 2017-12-18 RX ORDER — CEFTRIAXONE 500 MG/1
1 INJECTION, POWDER, FOR SOLUTION INTRAMUSCULAR; INTRAVENOUS EVERY 24 HOURS
Qty: 0 | Refills: 0 | Status: DISCONTINUED | OUTPATIENT
Start: 2017-12-19 | End: 2017-12-19

## 2017-12-18 RX ORDER — CEFTRIAXONE 500 MG/1
1 INJECTION, POWDER, FOR SOLUTION INTRAMUSCULAR; INTRAVENOUS ONCE
Qty: 0 | Refills: 0 | Status: COMPLETED | OUTPATIENT
Start: 2017-12-18 | End: 2017-12-18

## 2017-12-18 RX ORDER — INSULIN LISPRO 100/ML
VIAL (ML) SUBCUTANEOUS
Qty: 0 | Refills: 0 | Status: DISCONTINUED | OUTPATIENT
Start: 2017-12-18 | End: 2017-12-21

## 2017-12-18 RX ORDER — DEXTROSE 50 % IN WATER 50 %
1 SYRINGE (ML) INTRAVENOUS ONCE
Qty: 0 | Refills: 0 | Status: DISCONTINUED | OUTPATIENT
Start: 2017-12-18 | End: 2017-12-21

## 2017-12-18 RX ORDER — SODIUM CHLORIDE 9 MG/ML
1000 INJECTION INTRAMUSCULAR; INTRAVENOUS; SUBCUTANEOUS
Qty: 0 | Refills: 0 | Status: DISCONTINUED | OUTPATIENT
Start: 2017-12-18 | End: 2017-12-20

## 2017-12-18 RX ORDER — SODIUM CHLORIDE 9 MG/ML
1000 INJECTION, SOLUTION INTRAVENOUS
Qty: 0 | Refills: 0 | Status: DISCONTINUED | OUTPATIENT
Start: 2017-12-18 | End: 2017-12-21

## 2017-12-18 RX ORDER — SODIUM CHLORIDE 9 MG/ML
1000 INJECTION INTRAMUSCULAR; INTRAVENOUS; SUBCUTANEOUS ONCE
Qty: 0 | Refills: 0 | Status: COMPLETED | OUTPATIENT
Start: 2017-12-18 | End: 2017-12-18

## 2017-12-18 RX ORDER — AZITHROMYCIN 500 MG/1
500 TABLET, FILM COATED ORAL ONCE
Qty: 0 | Refills: 0 | Status: COMPLETED | OUTPATIENT
Start: 2017-12-18 | End: 2017-12-18

## 2017-12-18 RX ADMIN — Medication 2: at 17:34

## 2017-12-18 RX ADMIN — CLOPIDOGREL BISULFATE 75 MILLIGRAM(S): 75 TABLET, FILM COATED ORAL at 17:44

## 2017-12-18 RX ADMIN — Medication 125 MILLIGRAM(S): at 09:35

## 2017-12-18 RX ADMIN — Medication 40 MILLIGRAM(S): at 14:59

## 2017-12-18 RX ADMIN — Medication 81 MILLIGRAM(S): at 17:44

## 2017-12-18 RX ADMIN — SODIUM CHLORIDE 2000 MILLILITER(S): 9 INJECTION INTRAMUSCULAR; INTRAVENOUS; SUBCUTANEOUS at 12:21

## 2017-12-18 RX ADMIN — Medication 40 MILLIGRAM(S): at 21:45

## 2017-12-18 RX ADMIN — AZITHROMYCIN 255 MILLIGRAM(S): 500 TABLET, FILM COATED ORAL at 10:06

## 2017-12-18 RX ADMIN — Medication 3 MILLILITER(S): at 09:14

## 2017-12-18 RX ADMIN — AMLODIPINE BESYLATE 5 MILLIGRAM(S): 2.5 TABLET ORAL at 17:44

## 2017-12-18 RX ADMIN — CEFTRIAXONE 100 GRAM(S): 500 INJECTION, POWDER, FOR SOLUTION INTRAMUSCULAR; INTRAVENOUS at 09:36

## 2017-12-18 RX ADMIN — Medication 3 MILLILITER(S): at 20:19

## 2017-12-18 RX ADMIN — SODIUM CHLORIDE 75 MILLILITER(S): 9 INJECTION INTRAMUSCULAR; INTRAVENOUS; SUBCUTANEOUS at 15:00

## 2017-12-18 RX ADMIN — ATORVASTATIN CALCIUM 40 MILLIGRAM(S): 80 TABLET, FILM COATED ORAL at 21:45

## 2017-12-18 RX ADMIN — SODIUM CHLORIDE 1000 MILLILITER(S): 9 INJECTION INTRAMUSCULAR; INTRAVENOUS; SUBCUTANEOUS at 10:50

## 2017-12-18 RX ADMIN — SODIUM CHLORIDE 1000 MILLILITER(S): 9 INJECTION INTRAMUSCULAR; INTRAVENOUS; SUBCUTANEOUS at 13:39

## 2017-12-18 RX ADMIN — HEPARIN SODIUM 5000 UNIT(S): 5000 INJECTION INTRAVENOUS; SUBCUTANEOUS at 17:44

## 2017-12-18 RX ADMIN — Medication 0.5 MILLIGRAM(S): at 20:19

## 2017-12-18 NOTE — H&P ADULT - ASSESSMENT
Patient is a 76 yo male with pmhx of COPD, PVD, CAD, DMII, HTN, HLD presented to ED with COPD exacerbation. Patient is a 76 yo male with pmhx of COPD, PVD, CAD, DMII, HTN, HLD presented to ED with COPD exacerbation, MERRILL

## 2017-12-18 NOTE — H&P ADULT - PROBLEM SELECTOR PLAN 7
- dvt ppx heparin  IMPROVE VTE Individual Risk Assessment          RISK                                                          Points  [  ] Previous VTE                                                3  [  ] Thrombophilia                                             2  [  ] Lower limb paralysis                                   2        (unable to hold up >15 seconds)    [  ] Current Cancer                                             2         (within 6 months)  x] Immobilization > 24 hrs                              1  [  ] ICU/CCU stay > 24 hours                             1  x] Age > 60                                                         1    IMPROVE VTE Score: 2

## 2017-12-18 NOTE — H&P ADULT - PROBLEM SELECTOR PLAN 3
- ISS  - routine finger sticks  - diabetic diet - continue aspirin 81  - continue clopidogrel   - monitor vitals

## 2017-12-18 NOTE — PROVIDER CONTACT NOTE (CRITICAL VALUE NOTIFICATION) - ACTION/TREATMENT ORDERED:
Patient already received 3 liters of normal saline. Also patient is getting N/S at 75 cc/hr. Patient already received 3 liters of normal saline. Also patient is getting N/S at 75 cc/hr.  Will repeat lactate in 4 hours

## 2017-12-18 NOTE — ED PROVIDER NOTE - CARE PLAN
Principal Discharge DX:	Cough  Secondary Diagnosis:	Wheezing Principal Discharge DX:	Cough  Secondary Diagnosis:	Wheezing  Secondary Diagnosis:	COPD (chronic obstructive pulmonary disease)

## 2017-12-18 NOTE — H&P ADULT - PROBLEM SELECTOR PLAN 6
- dvt ppx heparin - continue valsartan/hctz with hold parameters - Hold HCTZ/ Valsartan secondary to MERRILL.  - Add Norvasc 5mg po daily with hold parameters.  - Resume home meds, once renal function better

## 2017-12-18 NOTE — H&P ADULT - ATTENDING COMMENTS
Patient seen and examined at bedside. admitted for acute COPD exacerbation. Plan as above discussed at length.  Also d/W Dr. Dejesus from Pulm, Knows patient and will see. Agreed with the plan. Patient seen and examined at bedside. admitted for acute COPD exacerbation. Plan as above discussed at length.  Also d/W Dr. Dejesus from Pulm, Knows patient and will see. Plan as above, edited where appropriate. Patient seen and examined at bedside. admitted for acute COPD exacerbation. Plan as above discussed at length.  Also d/W Dr. Dejesus from Pulm, Knows patient and will see. Plan as above, edited where appropriate. Lactate is high likely secondary to COPD exacerbation, hypoxia? f/u repeat lactate level, f/u blood cultures, continue fluids, bolus was given in ER.

## 2017-12-18 NOTE — ED ADULT NURSE NOTE - OBJECTIVE STATEMENT
Pt. stated he has had shortness of breath x 3 days. Pt. stated he has been taking his albuterol treatments via nebulizer with no relief x 3days. Pt. denies chest pain or discomfort.

## 2017-12-18 NOTE — H&P ADULT - NSHPPHYSICALEXAM_GEN_ALL_CORE
Physical Exam:  General: Well developed, well nourished, NAD  HEENT: NCAT, PERRLA, EOMI bl, moist mucous membranes   Neck: Supple, nontender, no mass  Neurology: A&Ox3, nonfocal  Respiratory: +diffused wheezing b/l   CV: RRR, +S1/S2, no murmurs, rubs or gallops  Abdominal: Soft, NT, ND +BSx4  Extremities: No C/C/E  Skin: warm, dry

## 2017-12-18 NOTE — H&P ADULT - PROBLEM SELECTOR PLAN 2
- continue aspirin 81  - continue clopidogrel   - monitor vitals ? CKD.   Gentle hydration, NS at 75ml/hour x 1 day.  monitor creatinine.  Avoid nephrotoxins. ? CKD. Hold ARB  Gentle hydration, NS at 75ml/hour x 1 day.  monitor creatinine.  Avoid nephrotoxins. ? CKD. Hold ARB for now, resume once renal function better. BP stable at present.   Gentle hydration, NS at 75ml/hour x 1 day.  monitor creatinine.  Avoid nephrotoxins. ? CKD. Hold ARB/ HCTZ  for now, resume once renal function better. BP stable at present.   Gentle hydration, NS at 75ml/hour x 1 day. Craetinine 1.1 in July 2017  monitor creatinine.  Avoid nephrotoxins.

## 2017-12-18 NOTE — ED PROVIDER NOTE - OBJECTIVE STATEMENT
pt c/o sob, productive cough, wheeze x few days. no fevers, chills, ha, d/n/v, cp, abd pain.  pmd - olga (hip)  pul[facility] - arturo

## 2017-12-18 NOTE — H&P ADULT - HISTORY OF PRESENT ILLNESS
Patient is a 78 yo male with pmhx of COPD, PVD, CAD, DMII, HTN, HLD presented to ED with COPD exacerbation. Per patient and his wife, he started having trouble breathing about 3-4 days ago and came to the ED today due to worsening of his symptoms. Patient states that he has been using nebulizer treatment at home every day about 4-6 times a day for the past 3-4 days. According to wife, nebulizing treatment has not been effective to alleviate his SOB. Patient recently was placed on Breo and Incruse which is not effective according to his wife. Patient states that his SOB worsens at night, needs to sleep on about 4 pillows at night. Patient does not use any home oxygen. Patient and wife also states that he does not ambulate much at home due to his chronic leg pain. Per wife, patient has very little trouble breathing prior to 4 days ago, and only needed to use his nebulizer occasionally. Currently states that he is breathing better on NC. Patient denies any headache, dizziness, changes in vision, chest pain, abdominal pain, or recent sick contacts.     In the ED, patient is afebrile 97.8, 132bpm, 119/81, RR 24 @ 78% on presentation. WBC 9.2, h/h 14.1/45. Na 140, K 4.5, BUN 21, Cr 1.5, Glucose 204. GFR 44. Lactate 2.2. Negative RVP. CXR shows hyperexpansion of lung consistent with COPD with no acute changes. Patient received zithromax, rocephin, IVF bolus, duoneb treatment  and solumedrol in ED.

## 2017-12-18 NOTE — H&P ADULT - NSHPREVIEWOFSYSTEMS_GEN_ALL_CORE
Constitutional: denies fever, chills, diaphoresis   HEENT: denies blurry vision, difficulty hearing  Respiratory: +SOB, +LA, +wheezing, denies cough, sputum production, hemoptysis  Cardiovascular: denies CP, palpitations, edema  Gastrointestinal: denies nausea, vomiting, diarrhea, constipation, abdominal pain, melena, hematochezia   Genitourinary: denies dysuria, frequency, urgency, hematuria   Musculoskeletal: +chronic lower leg pain  Neurologic: denies headache, weakness, dizziness, paresthesias, numbness/tingling  ROS negative except as noted above

## 2017-12-18 NOTE — H&P ADULT - PROBLEM SELECTOR PLAN 1
- continue rocephin and zithromax  - continue duoneb treatment  - continue supplemental O2   - monitor pulse ox   - monitor vitals  - f/u AM labs   - f/u blood cultures, f/u urine cultures  - f/u repeat lactate - continue rocephin and zithromax for possible URI causing COPD exacerbation   - continue duoneb treatment  - continue supplemental O2   - monitor pulse ox   - monitor vitals  - f/u AM labs   - f/u blood cultures, f/u urine cultures  - f/u repeat lactat-   -Dr. Dejesus from [facility] called, knows patient since from [facility].

## 2017-12-18 NOTE — H&P ADULT - NSHPSOCIALHISTORY_GEN_ALL_CORE
Previous smoker, quit 30 years ago   stop etoh about 4 months ago  stop marijuanna about 4 months ago  lives at home with wife

## 2017-12-19 LAB
ANION GAP SERPL CALC-SCNC: 6 MMOL/L — SIGNIFICANT CHANGE UP (ref 5–17)
BUN SERPL-MCNC: 16 MG/DL — SIGNIFICANT CHANGE UP (ref 7–23)
CALCIUM SERPL-MCNC: 9.9 MG/DL — SIGNIFICANT CHANGE UP (ref 8.5–10.1)
CHLORIDE SERPL-SCNC: 106 MMOL/L — SIGNIFICANT CHANGE UP (ref 96–108)
CO2 SERPL-SCNC: 31 MMOL/L — SIGNIFICANT CHANGE UP (ref 22–31)
CREAT SERPL-MCNC: 1.1 MG/DL — SIGNIFICANT CHANGE UP (ref 0.5–1.3)
GLUCOSE SERPL-MCNC: 166 MG/DL — HIGH (ref 70–99)
HBA1C BLD-MCNC: 6.4 % — HIGH (ref 4–5.6)
HCT VFR BLD CALC: 46.3 % — SIGNIFICANT CHANGE UP (ref 39–50)
HGB BLD-MCNC: 14.1 G/DL — SIGNIFICANT CHANGE UP (ref 13–17)
MCHC RBC-ENTMCNC: 27.1 PG — SIGNIFICANT CHANGE UP (ref 27–34)
MCHC RBC-ENTMCNC: 30.5 GM/DL — LOW (ref 32–36)
MCV RBC AUTO: 89 FL — SIGNIFICANT CHANGE UP (ref 80–100)
PLATELET # BLD AUTO: 290 K/UL — SIGNIFICANT CHANGE UP (ref 150–400)
POTASSIUM SERPL-MCNC: 4.9 MMOL/L — SIGNIFICANT CHANGE UP (ref 3.5–5.3)
POTASSIUM SERPL-SCNC: 4.9 MMOL/L — SIGNIFICANT CHANGE UP (ref 3.5–5.3)
RBC # BLD: 5.21 M/UL — SIGNIFICANT CHANGE UP (ref 4.2–5.8)
RBC # FLD: 13.8 % — SIGNIFICANT CHANGE UP (ref 10.3–14.5)
SODIUM SERPL-SCNC: 143 MMOL/L — SIGNIFICANT CHANGE UP (ref 135–145)
WBC # BLD: 9.3 K/UL — SIGNIFICANT CHANGE UP (ref 3.8–10.5)
WBC # FLD AUTO: 9.3 K/UL — SIGNIFICANT CHANGE UP (ref 3.8–10.5)

## 2017-12-19 PROCEDURE — 99233 SBSQ HOSP IP/OBS HIGH 50: CPT

## 2017-12-19 RX ORDER — FAMOTIDINE 10 MG/ML
20 INJECTION INTRAVENOUS DAILY
Qty: 0 | Refills: 0 | Status: DISCONTINUED | OUTPATIENT
Start: 2017-12-19 | End: 2017-12-21

## 2017-12-19 RX ORDER — HYDROCHLOROTHIAZIDE 25 MG
12.5 TABLET ORAL DAILY
Qty: 0 | Refills: 0 | Status: DISCONTINUED | OUTPATIENT
Start: 2017-12-19 | End: 2017-12-21

## 2017-12-19 RX ORDER — VALSARTAN 80 MG/1
160 TABLET ORAL DAILY
Qty: 0 | Refills: 0 | Status: DISCONTINUED | OUTPATIENT
Start: 2017-12-19 | End: 2017-12-21

## 2017-12-19 RX ORDER — IPRATROPIUM/ALBUTEROL SULFATE 18-103MCG
3 AEROSOL WITH ADAPTER (GRAM) INHALATION EVERY 6 HOURS
Qty: 0 | Refills: 0 | Status: DISCONTINUED | OUTPATIENT
Start: 2017-12-19 | End: 2017-12-21

## 2017-12-19 RX ADMIN — Medication 40 MILLIGRAM(S): at 14:07

## 2017-12-19 RX ADMIN — Medication 1: at 08:06

## 2017-12-19 RX ADMIN — Medication 2: at 12:53

## 2017-12-19 RX ADMIN — Medication 3 MILLILITER(S): at 04:30

## 2017-12-19 RX ADMIN — FAMOTIDINE 20 MILLIGRAM(S): 10 INJECTION INTRAVENOUS at 12:55

## 2017-12-19 RX ADMIN — CLOPIDOGREL BISULFATE 75 MILLIGRAM(S): 75 TABLET, FILM COATED ORAL at 12:53

## 2017-12-19 RX ADMIN — HEPARIN SODIUM 5000 UNIT(S): 5000 INJECTION INTRAVENOUS; SUBCUTANEOUS at 05:34

## 2017-12-19 RX ADMIN — CEFTRIAXONE 100 GRAM(S): 500 INJECTION, POWDER, FOR SOLUTION INTRAMUSCULAR; INTRAVENOUS at 03:52

## 2017-12-19 RX ADMIN — ATORVASTATIN CALCIUM 40 MILLIGRAM(S): 80 TABLET, FILM COATED ORAL at 22:32

## 2017-12-19 RX ADMIN — AMLODIPINE BESYLATE 5 MILLIGRAM(S): 2.5 TABLET ORAL at 05:35

## 2017-12-19 RX ADMIN — Medication 3 MILLILITER(S): at 20:33

## 2017-12-19 RX ADMIN — Medication 40 MILLIGRAM(S): at 05:35

## 2017-12-19 RX ADMIN — Medication 0.5 MILLIGRAM(S): at 20:33

## 2017-12-19 RX ADMIN — AZITHROMYCIN 255 MILLIGRAM(S): 500 TABLET, FILM COATED ORAL at 03:52

## 2017-12-19 RX ADMIN — HEPARIN SODIUM 5000 UNIT(S): 5000 INJECTION INTRAVENOUS; SUBCUTANEOUS at 17:04

## 2017-12-19 RX ADMIN — Medication 40 MILLIGRAM(S): at 22:32

## 2017-12-19 RX ADMIN — Medication 12.5 MILLIGRAM(S): at 17:04

## 2017-12-19 RX ADMIN — SODIUM CHLORIDE 75 MILLILITER(S): 9 INJECTION INTRAMUSCULAR; INTRAVENOUS; SUBCUTANEOUS at 03:53

## 2017-12-19 RX ADMIN — Medication 81 MILLIGRAM(S): at 12:53

## 2017-12-19 NOTE — PHYSICAL THERAPY INITIAL EVALUATION ADULT - CRITERIA FOR SKILLED THERAPEUTIC INTERVENTIONS
anticipated discharge recommendation/TBD after functional assessment anticipated discharge recommendation/impairments found

## 2017-12-19 NOTE — PROGRESS NOTE ADULT - PROBLEM SELECTOR PLAN 1
- continue rocephin and zithromax for possible URI causing COPD exacerbation   - continue duoneb treatment  - continue supplemental O2   - monitor pulse ox   - monitor vitals  - f/u AM labs   - f/u blood cultures, f/u urine cultures  - f/u repeat lactat-   -Dr. Dejesus from [facility] saw patient.

## 2017-12-19 NOTE — ED ADULT NURSE REASSESSMENT NOTE - NS ED NURSE REASSESS COMMENT FT1
Rapid response called at 0420 as patient sat up to use urinal which resulted in severe respiratory distress, patient with tripod posture, saturation dropping to low 80's on nasal cannula oxygen, poor movement of air, rapid and shallow breathing, unable to speak, HR increased to 130+. Patient admitted for COPD exacerbation/wheezing. ICU PA, resp therapist, medical residents responded. Duoneb given with some improvement, patient states he feels better. Upgraded to remote tele with continuous pulse ox.

## 2017-12-19 NOTE — PROGRESS NOTE ADULT - SUBJECTIVE AND OBJECTIVE BOX
Patient is a 77y old  Male who presents with a chief complaint of copd exacerbation (18 Dec 2017 13:55)       INTERVAL HPI/OVERNIGHT EVENTS: S/P RRT early this morning for sob, desaturation. Feels ok now. Remains on 3LO2 supplement.     MEDICATIONS  (STANDING):  ALBUTerol/ipratropium for Nebulization 3 milliLiter(s) Nebulizer every 6 hours  amLODIPine   Tablet 5 milliGRAM(s) Oral daily  aspirin enteric coated 81 milliGRAM(s) Oral daily  atorvastatin 40 milliGRAM(s) Oral at bedtime  azithromycin  IVPB 500 milliGRAM(s) IV Intermittent every 24 hours  buDESOnide   0.5 milliGRAM(s) Respule 0.5 milliGRAM(s) Inhalation two times a day  cefTRIAXone   IVPB 1 Gram(s) IV Intermittent every 24 hours  clopidogrel Tablet 75 milliGRAM(s) Oral daily  dextrose 5%. 1000 milliLiter(s) (50 mL/Hr) IV Continuous <Continuous>  dextrose 50% Injectable 12.5 Gram(s) IV Push once  dextrose 50% Injectable 25 Gram(s) IV Push once  dextrose 50% Injectable 25 Gram(s) IV Push once  heparin  Injectable 5000 Unit(s) SubCutaneous every 12 hours  insulin lispro (HumaLOG) corrective regimen sliding scale   SubCutaneous three times a day before meals  methylPREDNISolone sodium succinate Injectable 40 milliGRAM(s) IV Push every 8 hours  sodium chloride 0.9%. 1000 milliLiter(s) (75 mL/Hr) IV Continuous <Continuous>    MEDICATIONS  (PRN):  ALBUTerol/ipratropium for Nebulization 3 milliLiter(s) Nebulizer every 4 hours PRN Shortness of Breath and/or Wheezing  dextrose Gel 1 Dose(s) Oral once PRN Blood Glucose LESS THAN 70 milliGRAM(s)/deciliter  glucagon  Injectable 1 milliGRAM(s) IntraMuscular once PRN Glucose LESS THAN 70 milligrams/deciliter      Allergies    No Known Allergies    Intolerances    shellfish (Nausea)      REVIEW OF SYSTEMS:  CONSTITUTIONAL: No fever, weight loss, or fatigue  EYES: No eye pain, visual disturbances, or discharge  ENMT:  No difficulty hearing, tinnitus, vertigo; No sinus or throat pain  NECK: No pain or stiffness  RESPIRATORY: No cough, wheezing, chills or hemoptysis; + shortness of breath  CARDIOVASCULAR: No chest pain, palpitations, dizziness, or leg swelling  GASTROINTESTINAL: No abdominal or epigastric pain. No nausea, vomiting, or hematemesis; No diarrhea or constipation. No melena or hematochezia.  GENITOURINARY: No dysuria, frequency, hematuria, or incontinence  NEUROLOGICAL: No headaches, memory loss, loss of strength, numbness, or tremors  SKIN: No itching, burning, rashes, or lesions   LYMPH NODES: No enlarged glands  ENDOCRINE: No heat or cold intolerance; No hair loss; No polydipsia or polyuria  MUSCULOSKELETAL: No joint pain or swelling; No muscle, back, or extremity pain  PSYCHIATRIC: No depression, anxiety, mood swings, or difficulty sleeping  HEME/LYMPH: No easy bruising, or bleeding gums  ALLERGY AND IMMUNOLOGIC: No hives or eczema    Vital Signs Last 24 Hrs  T(C): 37.1 (19 Dec 2017 01:54), Max: 37.1 (19 Dec 2017 01:54)  T(F): 98.8 (19 Dec 2017 01:54), Max: 98.8 (19 Dec 2017 01:54)  HR: 101 (19 Dec 2017 05:47) (101 - 138)  BP: 109/55 (19 Dec 2017 05:47) (109/55 - 136/64)  BP(mean): --  RR: 18 (19 Dec 2017 05:47) (18 - 40)  SpO2: 98% (19 Dec 2017 05:47) (78% - 100%)    PHYSICAL EXAM:  GENERAL: NAD, well-groomed, well-developed  HEAD:  Atraumatic, Normocephalic  EYES: EOMI, PERRLA, conjunctiva and sclera clear  ENMT: No tonsillar erythema, exudates, or enlargement; Moist mucous membranes  NECK: Supple, No JVD, Normal thyroid  NERVOUS SYSTEM:  Alert & Oriented X3, Good concentration; Motor Strength 5/5 B/L upper and lower extremities; DTRs 2+ intact and symmetric  CHEST/LUNG: Decrease to  auscultation bilaterally; No rales, rhonchi, wheezing, or rubs  HEART: Regular rate and rhythm; No murmurs, rubs, or gallops  ABDOMEN: Soft, Nontender, Nondistended; Bowel sounds present  EXTREMITIES:  2+ Peripheral Pulses, No clubbing, cyanosis, or edema  LYMPH: No lymphadenopathy noted  SKIN: No rashes or lesions    LABS:                        14.1   9.3   )-----------( 290      ( 19 Dec 2017 06:09 )             46.3     19 Dec 2017 06:09    143    |  106    |  16     ----------------------------<  166    4.9     |  31     |  1.10     Ca    9.9        19 Dec 2017 06:09    TPro  7.3    /  Alb  3.9    /  TBili  0.4    /  DBili  x      /  AST  21     /  ALT  28     /  AlkPhos  91     18 Dec 2017 09:41    PT/INR - ( 18 Dec 2017 09:41 )   PT: 11.3 sec;   INR: 1.04 ratio         PTT - ( 18 Dec 2017 09:41 )  PTT:33.5 sec  CAPILLARY BLOOD GLUCOSE      POCT Blood Glucose.: 147 mg/dL (19 Dec 2017 04:44)  POCT Blood Glucose.: 225 mg/dL (18 Dec 2017 16:50)    BLOOD CULTURE    RADIOLOGY & ADDITIONAL TESTS:    Imaging Personally Reviewed:  [ ] YES     Consultant(s) Notes Reviewed:      Care Discussed with Consultants/Other Providers:

## 2017-12-19 NOTE — PROGRESS NOTE ADULT - SUBJECTIVE AND OBJECTIVE BOX
VITALS/LABS  12/19/2017 988 112 115/78 20 97%   12/19/2017 W 9.3 Hb 14.1 Plt 290k Na 143 K 4.9 CO2 31 Cr 1.1   REVIEW OF SYMPTOMS    Able to give ROS  Yes     RELIABLE No   CONSTITUTIONAL Weakness Yes  Chills No Vision changes No  ENDOCRINE No unexplained hair loss No heat or cold intolerance    ALLERGY No hives  Sore throat No   RESP Coughing blood No  Shortness of breath YES   NEURO No Headache  Confusion Pain neck No   CARDIAC No Chest pain No Palpitations   GI No Pain abdomen NO   Vomiting NO   PHYSICAL EXAM    HEENT Unremarkable PERRLA atraumatic   RESP Fair air entry EXP prolonged    Harsh breath sound Resp distres mild   CARDIAC S1 S2 No S3     NO JVD    ABDOMEN SOFT BS PRESENT NOT DISTENDED No hepatosplenomegaly PEDAL EDEMA present No calf tenderness  NO rash   GENERAL Not TOXIC looking  HOSPITAL COURSE PROBLEM ASSESSMENT PLAN  12/18/2017 ADMISSION [facility] P                                                                    LEG PAIN  12/18 V duplex n  RESP  O2 Monitor PO   COPD EX  solumed 40.3 (12/18)   DUONEB.4P (12/18) --> Duoneb.6 (12/18) Pulmicort (12/18)  LACTICEMIA   12/18/2017 LA 2.2   PNEUMONIA RESP TRACT INFECTION   12/18/2017 pct n 12/18/2017 RVP n   12/18/2017 CXR COPD nsc 7/6/2017    On azithro (12/18) Rocephin (12/18)   CAD   On ASA 81 (12/18) Plavix 75 (12/18) atorvastat 40 (12/18)   HYPERTENSION  Norvasc 5 (12/18) HCTZ 12.5 (12/19) Valsartan 160 (12/19)    MERRILL  12/18 Cr 1.5  DM   iss (12/18)   GLOBAL ISSUE/BEST PRACTICE:        PROBLEM: Analgesia:     na                        PROBLEM: Sedation:     na               PROBLEM: HOB elevation:   y             PROBLEM: Stress ulcer proph:    na                      PROBLEM: VTE prophylaxis:      hsc (12/18)                   PROBLEM: Glycemic control:    iss (12/18)    PROBLEM: Nutrition:    cons carb (12/18)           PROBLEM: Advanced directive: na     PROBLEM: Allergies:  na      TIME SPENT Over 25 minutes aggregate care time spent on encounter; activities included   direct patient care, counseling and/or coordinating care reviewing notes, lab data/ imaging , discussion with multidisciplinary team/ patient  /family. Risks, benefits, alternatives  discussed in detail. Proper antibiotic use issues addressed Questions/concerns  were addressed  as  best as possible   SUMMARY ASSESSMENT PLAN 12/18/2017 ADMISSION [facility] P   12/18/2017 ADMISSION [facility] P                                                               HPI 12/18/2017 ADMISSION [facility] P                                                                                           Patient is a 76 yo male former smoker quit 30 y Not on home O2 Had home nebs utd with flu and pneumonia vaccinations with pmhx of COPD, PVD, CAD, CABG DMII, HTN, HLD Pilonidal cyst admitted [facility] P 12/18/2017  with COPD exacerbation.   HOSPITAL COURSE 12/18/2017 ADMISSION [facility] P                                                               Pt was felt to be dyspneic sec COPD ex RVP as well as Pct were neg Pt was managed with azithro BD ICS and IVCS  DM was managed with iss   PROBLEM ASSESSMENT PLAN  12/18/2017 ADMISSION [facility] P                                                                    LEG PAIN  12/18 V duplex n  RESP  Given suppl O2 Monitor PO   COPD EX  Rxed with IVCS IBD ICS   LACTICEMIA   12/18/2017 Managed with IVF   PNEUMONIA RESP TRACT INFECTION   No clear evidence of bact pneumonia Kept on azithro (12/18) Rocephin (12/18) pending bc   CAD   Kept on ASA 81 (12/18) Plavix 75 (12/18) atorvastat 40 (12/18) No evidence active ongoing ischemia

## 2017-12-19 NOTE — CHART NOTE - NSCHARTNOTEFT_GEN_A_CORE
Patient is a 77y old  Male who presents with a chief complaint of copd exacerbation (18 Dec 2017 13:55)    Rapid Response was called on a 77y year old Male patient for respiratory distress, change in respiratory rate and O2 sat less than 90% on NC.     Patient was seen and examined at the bedside by the rapid response team and ICU team. Patient reports getting up to find a urinal when he became short of breath.     Vital Signs:   HR: 136  RR: 40  BP: 136/64  O2 sat: 82% on 2L NC     PHYSICAL EXAM:  GENERAL: elderly male in NAD  HEENT:  NC/AT, EOMI, PERRL,  CHEST/LUNG: diffuse expiratory wheeze in R lung, decreased breath sounds at bases  HEART: tachycardia, S1 S2; No murmurs, rubs, or gallops  ABDOMEN: Soft, NT/ND, +BS   EXTREMITIES:  No clubbing, cyanosis, or edema. +2 peripheral pulses   NERVOUS SYSTEM:  A&Ox3    Assessment/Plan - 77M with PMH of COPD, PVD, CAD, DMII, HTN, HLD admitted for COPD exacerbation exacerbation and MERRILL. Rapid Response called on a 77y Male for SOB.  - Duoneb treatment stat, Duoneb order switched to q6 hours standing and continue q4 PRN  - Plan for Solu-Medrol treatment at 6am   - O2 sat during treatment was 97% on NC, patient reports SOB resolved after treatment.   - Patient appeared more comfortable following treatment.  - Attending made aware  - Will continue to follow, RN to call if any changes. Patient is a 77y old  Male who presents with a chief complaint of copd exacerbation (18 Dec 2017 13:55)    Rapid Response was called on a 77y year old Male patient for respiratory distress, change in respiratory rate and O2 sat less than 90% on NC.     Patient was seen and examined at the bedside by the rapid response team and ICU team. Patient reports trying to get out of bed to find a urinal and became short of breath.     Vital Signs:   HR: 136  RR: 40  BP: 136/64  O2 sat: 82% on 2L NC     PHYSICAL EXAM:  GENERAL: elderly male in NAD  HEENT:  NC/AT, EOMI, PERRL,  CHEST/LUNG: diffuse expiratory wheeze in R lung, decreased breath sounds at bases  HEART: tachycardia, S1 S2; No murmurs, rubs, or gallops  ABDOMEN: Soft, NT/ND, +BS   EXTREMITIES:  No clubbing, cyanosis, or edema. +2 peripheral pulses   NERVOUS SYSTEM:  A&Ox3    Assessment/Plan - 77M with PMH of COPD, PVD, CAD, DMII, HTN, HLD admitted for COPD exacerbation and MERRILL. Rapid Response called on a 77y Male for SOB.  - Duoneb treatment stat, Duoneb order switched to q6 hours standing and continue q4 PRN  - Plan for Solu-Medrol treatment at 6am   - O2 sat during treatment was 97% on NC, patient reports SOB resolved after treatment.   - Patient appeared more comfortable following treatment.  - Attending made aware  - Will continue to follow, RN to call if any changes.

## 2017-12-19 NOTE — PROGRESS NOTE ADULT - ASSESSMENT
Patient is a 76 yo male with pmhx of COPD, PVD, CAD, DMII, HTN, HLD presented to ED with COPD exacerbation, MERRILL

## 2017-12-19 NOTE — PHYSICAL THERAPY INITIAL EVALUATION ADULT - ADDITIONAL COMMENTS
Patient lives in a 2 story home with steps to 2nd floor and basement; steps to enter home. Patient with no hx of falls.

## 2017-12-19 NOTE — PHYSICAL THERAPY INITIAL EVALUATION ADULT - PERTINENT HX OF CURRENT PROBLEM, REHAB EVAL
Patient is a 76 yo male with pmhx of COPD, PVD, CAD, DMII, HTN, HLD presented to ED with COPD exacerbation. Per patient and his wife, he started having trouble breathing about 3-4 days ago and came to the ED today due to worsening of his symptoms.

## 2017-12-19 NOTE — CHART NOTE - NSCHARTNOTEFT_GEN_A_CORE
Patient is a 77y old  Male who presents with a chief complaint of copd exacerbation (18 Dec 2017 13:55)    Rapid Response was called on a 77y year old Male patient for respiratory distress, change in respiratory rate and O2 sat less than 90% on NC.     Follow up: patient received standing Solu-Medrol dose. Denies feeling short of breath. States SOB only occurred because he was having difficulty getting out of bed to find a urinal. Denies using O2 at home.     Repeat Vital Signs:   HR: 98  RR: 18  BP: 109/62  O2 sat: 96% 2L NC     PHYSICAL EXAM:  GENERAL: elderly male in NAD, laying comfortably in bed  HEENT:  NC/AT, EOMI, PERRL  CHEST/LUNG: diffuse expiratory wheezing bilaterally  HEART: Regular rate and rhythm; No murmurs, rubs, or gallops  ABDOMEN: Soft, NT/ND, +BS   EXTREMITIES:  No clubbing, cyanosis, or edema. +2 peripheral pulses     Assessment/Plan -  77M with PMH of COPD, PVD, CAD, DMII, HTN, HLD admitted for COPD exacerbation and MERRILL. Rapid Response called on a 77y Male for SOB.  -Received Solu-Medrol standing treatment   -Patient appears more comfortable, denies shortness of breath   -NC lowered from 5L to 3L, satting at 97%.   - Will continue to follow, RN to call if any changes.

## 2017-12-20 ENCOUNTER — TRANSCRIPTION ENCOUNTER (OUTPATIENT)
Age: 77
End: 2017-12-20

## 2017-12-20 LAB
ANION GAP SERPL CALC-SCNC: 3 MMOL/L — LOW (ref 5–17)
BUN SERPL-MCNC: 21 MG/DL — SIGNIFICANT CHANGE UP (ref 7–23)
CALCIUM SERPL-MCNC: 9.5 MG/DL — SIGNIFICANT CHANGE UP (ref 8.5–10.1)
CHLORIDE SERPL-SCNC: 102 MMOL/L — SIGNIFICANT CHANGE UP (ref 96–108)
CO2 SERPL-SCNC: 35 MMOL/L — HIGH (ref 22–31)
CREAT SERPL-MCNC: 1.1 MG/DL — SIGNIFICANT CHANGE UP (ref 0.5–1.3)
GLUCOSE SERPL-MCNC: 177 MG/DL — HIGH (ref 70–99)
NT-PROBNP SERPL-SCNC: 348 PG/ML — SIGNIFICANT CHANGE UP (ref 0–450)
POTASSIUM SERPL-MCNC: 5.1 MMOL/L — SIGNIFICANT CHANGE UP (ref 3.5–5.3)
POTASSIUM SERPL-SCNC: 5.1 MMOL/L — SIGNIFICANT CHANGE UP (ref 3.5–5.3)
SODIUM SERPL-SCNC: 140 MMOL/L — SIGNIFICANT CHANGE UP (ref 135–145)

## 2017-12-20 PROCEDURE — 93306 TTE W/DOPPLER COMPLETE: CPT | Mod: 26

## 2017-12-20 PROCEDURE — 99233 SBSQ HOSP IP/OBS HIGH 50: CPT

## 2017-12-20 RX ADMIN — Medication 3 MILLILITER(S): at 14:57

## 2017-12-20 RX ADMIN — Medication 3 MILLILITER(S): at 09:49

## 2017-12-20 RX ADMIN — ATORVASTATIN CALCIUM 40 MILLIGRAM(S): 80 TABLET, FILM COATED ORAL at 21:30

## 2017-12-20 RX ADMIN — Medication 3 MILLILITER(S): at 19:41

## 2017-12-20 RX ADMIN — Medication 3 MILLILITER(S): at 00:46

## 2017-12-20 RX ADMIN — Medication 1: at 08:40

## 2017-12-20 RX ADMIN — Medication 0.5 MILLIGRAM(S): at 19:41

## 2017-12-20 RX ADMIN — CLOPIDOGREL BISULFATE 75 MILLIGRAM(S): 75 TABLET, FILM COATED ORAL at 11:05

## 2017-12-20 RX ADMIN — HEPARIN SODIUM 5000 UNIT(S): 5000 INJECTION INTRAVENOUS; SUBCUTANEOUS at 05:30

## 2017-12-20 RX ADMIN — Medication 12.5 MILLIGRAM(S): at 05:31

## 2017-12-20 RX ADMIN — Medication 81 MILLIGRAM(S): at 11:05

## 2017-12-20 RX ADMIN — Medication 3: at 17:35

## 2017-12-20 RX ADMIN — Medication 3: at 12:57

## 2017-12-20 RX ADMIN — Medication 40 MILLIGRAM(S): at 11:05

## 2017-12-20 RX ADMIN — VALSARTAN 160 MILLIGRAM(S): 80 TABLET ORAL at 05:30

## 2017-12-20 RX ADMIN — FAMOTIDINE 20 MILLIGRAM(S): 10 INJECTION INTRAVENOUS at 11:05

## 2017-12-20 RX ADMIN — Medication 40 MILLIGRAM(S): at 05:30

## 2017-12-20 RX ADMIN — Medication 0.5 MILLIGRAM(S): at 09:49

## 2017-12-20 RX ADMIN — HEPARIN SODIUM 5000 UNIT(S): 5000 INJECTION INTRAVENOUS; SUBCUTANEOUS at 17:41

## 2017-12-20 NOTE — DISCHARGE NOTE ADULT - HOSPITAL COURSE
Patient was admitted and started on IV steroids, Nebulizer treatments. Patient was admitted and started on IV steroids, Nebulizer treatments.  Patient was seen by Pulmonologist Dr. Dejesus. Patient was given O2 supplement, later was tapered off. Patient is a 78 yo male with pmhx of COPD, PVD, CAD, DMII, HTN, HLD presented to ED with COPD exacerbation. Per patient and his wife, he started having trouble breathing about 3-4 days ago and came to the ED today due to worsening of his symptoms. Patient states that he has been using nebulizer treatment at home every day about 4-6 times a day for the past 3-4 days. According to wife, nebulizing treatment has not been effective to alleviate his SOB. Patient recently was placed on Breo and Incruse which is not effective according to his wife. Patient states that his SOB worsens at night, needs to sleep on about 4 pillows at night. Patient does not use any home oxygen. Patient and wife also states that he does not ambulate much at home due to his chronic leg pain. Per wife, patient has very little trouble breathing prior to 4 days ago, and only needed to use his nebulizer occasionally. Currently states that he is breathing better on NC. Patient denies any headache, dizziness, changes in vision, chest pain, abdominal pain, or recent sick contacts.     In the ED, patient is afebrile 97.8, 132bpm, 119/81, RR 24 @ 78% on presentation. WBC 9.2, h/h 14.1/45. Na 140, K 4.5, BUN 21, Cr 1.5, Glucose 204. GFR 44. Lactate 2.2. Negative RVP. CXR shows hyperexpansion of lung consistent with COPD with no acute changes. Patient received zithromax, rocephin, IVF bolus, duoneb treatment  and solumedrol in ED.     Patient was admitted for acute copd exacerbation, and started on IV steroids, Nebulizer treatments.  Patient was seen by Pulmonologist Dr. Dejesus. Patient was given O2 supplement, later was tapered off. stable no more wheezing.   O2 was86 % on RA at rest and activity then 94 % on 2 lit at rest and activity . spoke to Dr Dukes to see him on Monday.   I spent 45 min and spoke to family and Dr Dejesus and notified DR Avila pcp.    PHYSICAL EXAM    Constitutional: NAD, well-groomed, well-developed  HEENT: PERRLA, EOMI, Normal Hearing, MMM  Neck: No LAD, No JVD  Back: Normal spine flexure, No CVA tenderness  Respiratory: CTAB/L   Cardiovascular: S1 and S2, RRR, no M/G/R  Gastrointestinal: BS+, soft, NT/ND  Extremities: No peripheral edema  Vascular: 2+ peripheral pulses  Neurological: A/O x 3, no focal deficits  Skin: No rashes

## 2017-12-20 NOTE — PROGRESS NOTE ADULT - SUBJECTIVE AND OBJECTIVE BOX
Patient is a 77y old  Male who presents with a chief complaint of SOB (19 Dec 2017 10:01)       INTERVAL HPI/ OVERNIGHT EVENTS: SOB and cough has been improving.     MEDICATIONS  (STANDING):  ALBUTerol/ipratropium for Nebulization 3 milliLiter(s) Nebulizer every 6 hours  aspirin enteric coated 81 milliGRAM(s) Oral daily  atorvastatin 40 milliGRAM(s) Oral at bedtime  buDESOnide   0.5 milliGRAM(s) Respule 0.5 milliGRAM(s) Inhalation two times a day  clopidogrel Tablet 75 milliGRAM(s) Oral daily  dextrose 5%. 1000 milliLiter(s) (50 mL/Hr) IV Continuous <Continuous>  dextrose 50% Injectable 12.5 Gram(s) IV Push once  dextrose 50% Injectable 25 Gram(s) IV Push once  dextrose 50% Injectable 25 Gram(s) IV Push once  famotidine    Tablet 20 milliGRAM(s) Oral daily  heparin  Injectable 5000 Unit(s) SubCutaneous every 12 hours  hydrochlorothiazide 12.5 milliGRAM(s) Oral daily  insulin lispro (HumaLOG) corrective regimen sliding scale   SubCutaneous three times a day before meals  methylPREDNISolone sodium succinate Injectable 40 milliGRAM(s) IV Push every 12 hours  sodium chloride 0.9%. 1000 milliLiter(s) (75 mL/Hr) IV Continuous <Continuous>  valsartan 160 milliGRAM(s) Oral daily    MEDICATIONS  (PRN):  ALBUTerol/ipratropium for Nebulization 3 milliLiter(s) Nebulizer every 4 hours PRN Shortness of Breath and/or Wheezing  dextrose Gel 1 Dose(s) Oral once PRN Blood Glucose LESS THAN 70 milliGRAM(s)/deciliter  glucagon  Injectable 1 milliGRAM(s) IntraMuscular once PRN Glucose LESS THAN 70 milligrams/deciliter      Allergies    No Known Allergies    Intolerances    shellfish (Nausea)      REVIEW OF SYSTEMS:  CONSTITUTIONAL: No fever, weight loss, or fatigue  EYES: No eye pain, visual disturbances, or discharge  ENMT:  No difficulty hearing, tinnitus, vertigo; No sinus or throat pain  NECK: No pain or stiffness  RESPIRATORY: + cough, +wheezing, No  chills or hemoptysis; + shortness of breath  CARDIOVASCULAR: No chest pain, palpitations, dizziness, or leg swelling  GASTROINTESTINAL: No abdominal or epigastric pain. No nausea, vomiting, or hematemesis; No diarrhea or constipation. No melena or hematochezia.  GENITOURINARY: No dysuria, frequency, hematuria, or incontinence  NEUROLOGICAL: No headaches, memory loss, loss of strength, numbness, or tremors  SKIN: No itching, burning, rashes, or lesions   LYMPH NODES: No enlarged glands  ENDOCRINE: No heat or cold intolerance; No hair loss; No polydipsia or polyuria  MUSCULOSKELETAL: No joint pain or swelling; No muscle, back, or extremity pain  PSYCHIATRIC: No depression, anxiety, mood swings, or difficulty sleeping  HEME/LYMPH: No easy bruising, or bleeding gums  ALLERGY AND IMMUNOLOGIC: No hives or eczema    Vital Signs Last 24 Hrs  T(C): 36.7 (20 Dec 2017 04:48), Max: 37.1 (19 Dec 2017 21:28)  T(F): 98 (20 Dec 2017 04:48), Max: 98.8 (19 Dec 2017 21:28)  HR: 76 (20 Dec 2017 04:48) (76 - 106)  BP: 128/64 (20 Dec 2017 04:48) (116/66 - 148/64)  BP(mean): --  RR: 18 (20 Dec 2017 04:48) (18 - 20)  SpO2: 98% (20 Dec 2017 04:48) (98% - 100%)    PHYSICAL EXAM:  GENERAL: NAD, well-groomed, well-developed  HEAD:  Atraumatic, Normocephalic  EYES: EOMI, PERRLA, conjunctiva and sclera clear  ENMT: No tonsillar erythema, exudates, or enlargement; Moist mucous membranes.  NECK: Supple, No JVD, Normal thyroid  NERVOUS SYSTEM:  Alert & Oriented X3, Good concentration; Motor Strength 5/5 B/L upper and lower extremities; DTRs 2+ intact and symmetric  CHEST/LUNG: Decreased breath sounds  to auscultation bilaterally; + bilat expiratory wheezes.  HEART: Regular rate and rhythm; No murmurs, rubs, or gallops  ABDOMEN: Soft, Nontender, Nondistended; Bowel sounds present  EXTREMITIES:  2+ Peripheral Pulses, No clubbing, cyanosis, or edema  LYMPH: No lymphadenopathy noted  SKIN: No rashes or lesions    LABS:      Ca    9.9        19 Dec 2017 06:09      PT/INR - ( 18 Dec 2017 09:41 )   PT: 11.3 sec;   INR: 1.04 ratio         PTT - ( 18 Dec 2017 09:41 )  PTT:33.5 sec  CAPILLARY BLOOD GLUCOSE      POCT Blood Glucose.: 166 mg/dL (20 Dec 2017 07:49)  POCT Blood Glucose.: 172 mg/dL (19 Dec 2017 21:24)  POCT Blood Glucose.: 129 mg/dL (19 Dec 2017 16:39)  POCT Blood Glucose.: 225 mg/dL (19 Dec 2017 12:27)    BLOOD CULTURE  12-18 @ 12:33   No growth to date.  --  --    RADIOLOGY & ADDITIONAL TESTS:    Imaging Personally Reviewed:  [ ] YES     Consultant(s) Notes Reviewed:      Care Discussed with Consultants/Other Providers:

## 2017-12-20 NOTE — DIETITIAN INITIAL EVALUATION ADULT. - PROBLEM SELECTOR PLAN 6
- Hold HCTZ/ Valsartan secondary to MERRILL.  - Add Norvasc 5mg po daily with hold parameters.  - Resume home meds, once renal function better

## 2017-12-20 NOTE — DISCHARGE NOTE ADULT - CARE PLAN
Principal Discharge DX:	COPD (chronic obstructive pulmonary disease)  Goal:	Improvement.  Instructions for follow-up, activity and diet:	Complete course of Prednisone as prescribed. Continue home meds. F/U with your pulmonologist.  Secondary Diagnosis:	MERRILL (acute kidney injury)  Goal:	Resolved.  Instructions for follow-up, activity and diet:	F/U with PCP.  Secondary Diagnosis:	CAD (Coronary Artery Disease)  Instructions for follow-up, activity and diet:	Continue home meds. F/U with your doctor.  Secondary Diagnosis:	Dyslipidemia  Instructions for follow-up, activity and diet:	Continue home meds. F/u with your doctor  Secondary Diagnosis:	Essential hypertension  Instructions for follow-up, activity and diet:	Continue home meds. F/u with PCP.  Secondary Diagnosis:	DM2 (diabetes mellitus, type 2)  Instructions for follow-up, activity and diet:	Continue home meds. F/u with your PCP. Principal Discharge DX:	COPD (chronic obstructive pulmonary disease)  Goal:	Improvement.  Instructions for follow-up, activity and diet:	Complete course of Prednisone as prescribed. Continue home meds. F/U with your pulmonologist.  called Dr Oleg haynes him next week  Secondary Diagnosis:	MERRILL (acute kidney injury)  Goal:	Resolved.  Instructions for follow-up, activity and diet:	F/U with PCP.  Secondary Diagnosis:	CAD (Coronary Artery Disease)  Instructions for follow-up, activity and diet:	Continue home meds. F/U with your doctor.  Secondary Diagnosis:	Dyslipidemia  Instructions for follow-up, activity and diet:	Continue home meds. F/u with your doctor  Secondary Diagnosis:	Essential hypertension  Instructions for follow-up, activity and diet:	Continue home meds. F/u with PCP.  Secondary Diagnosis:	DM2 (diabetes mellitus, type 2)  Instructions for follow-up, activity and diet:	Continue home meds. F/u with your PCP.

## 2017-12-20 NOTE — PROGRESS NOTE ADULT - ASSESSMENT
Patient is a 78 yo male with pmhx of COPD, PVD, CAD, DMII, HTN, HLD presented to ED with COPD exacerbation, MERRILL

## 2017-12-20 NOTE — DISCHARGE NOTE ADULT - CARE PROVIDER_API CALL
Sarah Avila), Internal Medicine  [address] [place] [address]  Phone: [phone]    Arun Dejesus), Critical Care Medicine; Internal Medicine; Pulmonary Disease  [address] [place] [address]  Phone: [phone]  Fax: [phone]

## 2017-12-20 NOTE — DISCHARGE NOTE ADULT - MEDICATION SUMMARY - MEDICATIONS TO TAKE
I will START or STAY ON the medications listed below when I get home from the hospital:    predniSONE 10 mg oral tablet  -- 4 tab(s) by mouth once a day then   3 tab for 3 days then   2 tab for 3 days  1 tab for 3 days  0.5 tab for 2 days   -- It is very important that you take or use this exactly as directed.  Do not skip doses or discontinue unless directed by your doctor.  Obtain medical advice before taking any non-prescription drugs as some may affect the action of this medication.  Take with food or milk.    -- Indication: For COPD (chronic obstructive pulmonary disease)    aspirin 81 mg oral tablet  -- 1 tab(s) by mouth once a day  -- Indication: For CAD (Coronary Artery Disease)    tamsulosin 0.4 mg oral capsule  -- 1 cap(s) by mouth once a day (at bedtime)  -- Indication: For Home meds     metFORMIN  -- 1000 milligram(s) by mouth 2 times a day  -- Indication: For DM2 (diabetes mellitus, type 2)    glipiZIDE 2.5 mg oral tablet, extended release  -- 1 tab(s) by mouth 2 times a day (with meals)  -- Indication: For Diabetes Mellitus, Type II    atorvastatin 40 mg oral tablet  -- 1 tab(s) by mouth once a day (at bedtime)  -- Indication: For CAD (Coronary Artery Disease)    Diovan  mg-12.5 mg oral tablet  -- 1 tab(s) by mouth once a day  -- Indication: For Essential hypertension    clopidogrel 75 mg oral tablet  -- 1 tab(s) by mouth once a day  -- Indication: For CAD (Coronary Artery Disease)    theophylline 400 mg/24 hours oral capsule, extended release  -- 1 cap(s) by mouth once a day  -- Indication: For COPD (chronic obstructive pulmonary disease)    Incruse Ellipta 62.5 mcg/inh inhalation powder  -- 1 puff(s) inhaled once a day  -- Indication: For COPD (chronic obstructive pulmonary disease)    Breo Ellipta 200 mcg-25 mcg/inh inhalation powder  -- 1 puff(s) inhaled once a day  -- Indication: For COPD (chronic obstructive pulmonary disease)    Daliresp 500 mcg oral tablet  -- 1 tab(s) by mouth once a day  -- Indication: For COPD (chronic obstructive pulmonary disease)    Multiple Vitamins with Minerals oral tablet  -- 1 tab(s) by mouth once a day  -- Indication: For Home meds

## 2017-12-20 NOTE — DIETITIAN INITIAL EVALUATION ADULT. - OTHER INFO
patient reports with good PO intake follows consistent cho diet at home states with some diet non compliance low Na diet but wife keeps an eye on him.  refusing diet education at this time. states wife knows diet

## 2017-12-20 NOTE — DISCHARGE NOTE ADULT - PLAN OF CARE
Improvement. Complete course of Prednisone as prescribed. Continue home meds. F/U with your pulmonologist. Resolved. F/U with PCP. Continue home meds. F/U with your doctor. Continue home meds. F/u with your doctor Continue home meds. F/u with PCP. Continue home meds. F/u with your PCP. Complete course of Prednisone as prescribed. Continue home meds. F/U with your pulmonologist.  called Dr Oleg haynes him next week

## 2017-12-20 NOTE — DISCHARGE NOTE ADULT - PATIENT PORTAL LINK FT
“You can access the [facility] Patient Portal, offered by [facility], by registering with the following website: [email or web address]”

## 2017-12-20 NOTE — DIETITIAN INITIAL EVALUATION ADULT. - PROBLEM SELECTOR PLAN 2
? CKD. Hold ARB/ HCTZ  for now, resume once renal function better. BP stable at present.   Gentle hydration, NS at 75ml/hour x 1 day. Craetinine 1.1 in July 2017  monitor creatinine.  Avoid nephrotoxins.

## 2017-12-20 NOTE — DISCHARGE NOTE ADULT - SECONDARY DIAGNOSIS.
MERRILL (acute kidney injury) CAD (Coronary Artery Disease) Dyslipidemia Essential hypertension DM2 (diabetes mellitus, type 2)

## 2017-12-20 NOTE — DISCHARGE NOTE ADULT - MEDICATION SUMMARY - MEDICATIONS TO CHANGE
I will SWITCH the dose or number of times a day I take the medications listed below when I get home from the hospital:    predniSONE 20 mg oral tablet  -- 1 tab(s) by mouth once a day for 4 more days starting tomorrow (7/11/17)

## 2017-12-20 NOTE — PROGRESS NOTE ADULT - SUBJECTIVE AND OBJECTIVE BOX
copd ex improved   Patient seen /examined/evaluated  today Plan/Questions explained (to patient/ancillary staff/colleagues) Chart notes reviewd copd ex improved   Patient seen /examined/evaluated  today Plan/Questions explained (to patient/ancillary staff/colleagues) Chart notes reviewd     VITALS/LABS  12/20/2017 afeb 82 140/80 16 98%   12/20/2017 Na 140 K 5.1 CO2 35 Cr 1.1   12/19/2017 W 9.3 Hb 14.1 Plt 290k Na 143 K 4.9 CO2 31 Cr 1.1   REVIEW OF SYMPTOMS    Able to give ROS  Yes     RELIABLE No   CONSTITUTIONAL Weakness Yes  Chills No Vision changes No  ENDOCRINE No unexplained hair loss No heat or cold intolerance    ALLERGY No hives  Sore throat No   RESP Coughing blood No  Shortness of breath YES   NEURO No Headache  Confusion Pain neck No   CARDIAC No Chest pain No Palpitations   GI No Pain abdomen NO   Vomiting NO   PHYSICAL EXAM    HEENT Unremarkable PERRLA atraumatic   RESP Fair air entry EXP prolonged    Harsh breath sound Resp distres mild   CARDIAC S1 S2 No S3     NO JVD    ABDOMEN SOFT BS PRESENT NOT DISTENDED No hepatosplenomegaly PEDAL EDEMA present No calf tenderness  NO rash   GENERAL Not TOXIC looking  HOSPITAL COURSE PROBLEM ASSESSMENT PLAN  12/18/2017 ADMISSION [facility]        NEED FOR HOME O2  12/20 RA resp 90     If po drops below 88 on ambulation on ra then he will need home O2         12/20 test ordered                                                 LEG PAIN  12/18 V duplex n  RESP  O2 Monitor PO   COPD EX  solumed 40.3 (12/18) --> Pred 40 (12/20)   DUONEB.4P (12/18) --> Duoneb.6 (12/18) Pulmicort (12/18)  LACTICEMIA   12/18/2017 LA 2.2   PNEUMONIA RESP TRACT INFECTION   12/18/2017 pct n 12/18/2017 RVP n 12/18 bc n   12/18/2017 CXR COPD nsc 7/6/2017    On azithro (12/18-12/20) Rocephin (12/18-12/20)   CAD   On ASA 81 (12/18) Plavix 75 (12/18) atorvastat 40 (12/18)   HYPERTENSION  Norvasc 5 (12/18) HCTZ 12.5 (12/19) Valsartan 160 (12/19)    MERRILL  12/18-12/20 Cr 1.5-1.1  DM   iss (12/18)   GLOBAL ISSUE/BEST PRACTICE:        PROBLEM: Analgesia:     na                        PROBLEM: Sedation:     na               PROBLEM: HOB elevation:   y             PROBLEM: Stress ulcer proph:    na                      PROBLEM: VTE prophylaxis:      hsc (12/18)                   PROBLEM: Glycemic control:    iss (12/18)    PROBLEM: Nutrition:    cons carb (12/18)           PROBLEM: Advanced directive: na     PROBLEM: Allergies:  na      TIME SPENT Over 25 minutes aggregate care time spent on encounter; activities included   direct patient care, counseling and/or coordinating care reviewing notes, lab data/ imaging , discussion with multidisciplinary team/ patient  /family. Risks, benefits, alternatives  discussed in detail. Proper antibiotic use issues addressed Questions/concerns  were addressed  as  best as possible   SUMMARY ASSESSMENT PLAN 12/18/2017 ADMISSION [facility]   12/18/2017 ADMISSION [facility]                                                                 HPI 12/18/2017 ADMISSION [facility]                                                                                           Patient is a 76 yo male former smoker quit 30 y Not on home O2 Had home nebs utd with flu and pneumonia vaccinations with pmhx of COPD, PVD, CAD, CABG DMII, HTN, HLD Pilonidal cyst admitted [facility] 12/18/2017  with COPD exacerbation.   HOSPITAL COURSE 12/18/2017 ADMISSION [facility]                                                               Pt was felt to be dyspneic sec COPD ex RVP as well as Pct were neg Pt was managed with azithro BD ICS and IVCS  DM was managed with iss   PROBLEM ASSESSMENT PLAN  12/18/2017 ADMISSION [facility]                                                                    LEG PAIN  12/18 V duplex n  RESP  Given suppl O2 Monitor PO   COPD EX  Rxed with IVCS IBD ICS   LACTICEMIA   12/18/2017 Managed with IVF   PNEUMONIA RESP TRACT INFECTION   No clear evidence of bact pneumonia Kept on azithro (12/18) Rocephin (12/18) pending bc then DCed   CAD   Kept on ASA 81 (12/18) Plavix 75 (12/18) atorvastat 40 (12/18) No evidence active ongoing ischemia

## 2017-12-20 NOTE — PROGRESS NOTE ADULT - PROBLEM SELECTOR PLAN 1
- Check BNP.   - continue duoneb treatment  - Check O2 sat on room air, at rest and ambulation.   - Blood cultures negative to date.   -Pulm follow up, Dr. Dejesus   -Switch to Prednisone 40mg po daily.

## 2017-12-20 NOTE — DIETITIAN INITIAL EVALUATION ADULT. - PROBLEM SELECTOR PLAN 1
- continue rocephin and zithromax for possible URI causing COPD exacerbation   - continue duoneb treatment  - continue supplemental O2   - monitor pulse ox   - monitor vitals  - f/u AM labs   - f/u blood cultures, f/u urine cultures  - f/u repeat lactat-   -Dr. Dejesus from Pul called, knows patient since from [facility]IP.

## 2017-12-21 VITALS
OXYGEN SATURATION: 100 % | SYSTOLIC BLOOD PRESSURE: 123 MMHG | DIASTOLIC BLOOD PRESSURE: 82 MMHG | RESPIRATION RATE: 18 BRPM | TEMPERATURE: 99 F | HEART RATE: 102 BPM

## 2017-12-21 LAB
ANION GAP SERPL CALC-SCNC: 5 MMOL/L — SIGNIFICANT CHANGE UP (ref 5–17)
BUN SERPL-MCNC: 21 MG/DL — SIGNIFICANT CHANGE UP (ref 7–23)
CALCIUM SERPL-MCNC: 9.3 MG/DL — SIGNIFICANT CHANGE UP (ref 8.5–10.1)
CHLORIDE SERPL-SCNC: 102 MMOL/L — SIGNIFICANT CHANGE UP (ref 96–108)
CO2 SERPL-SCNC: 36 MMOL/L — HIGH (ref 22–31)
CREAT SERPL-MCNC: 1.1 MG/DL — SIGNIFICANT CHANGE UP (ref 0.5–1.3)
GLUCOSE SERPL-MCNC: 126 MG/DL — HIGH (ref 70–99)
HCT VFR BLD CALC: 40.4 % — SIGNIFICANT CHANGE UP (ref 39–50)
HGB BLD-MCNC: 12.4 G/DL — LOW (ref 13–17)
MCHC RBC-ENTMCNC: 27.3 PG — SIGNIFICANT CHANGE UP (ref 27–34)
MCHC RBC-ENTMCNC: 30.8 GM/DL — LOW (ref 32–36)
MCV RBC AUTO: 88.8 FL — SIGNIFICANT CHANGE UP (ref 80–100)
PLATELET # BLD AUTO: 239 K/UL — SIGNIFICANT CHANGE UP (ref 150–400)
POTASSIUM SERPL-MCNC: 4.4 MMOL/L — SIGNIFICANT CHANGE UP (ref 3.5–5.3)
POTASSIUM SERPL-SCNC: 4.4 MMOL/L — SIGNIFICANT CHANGE UP (ref 3.5–5.3)
RBC # BLD: 4.55 M/UL — SIGNIFICANT CHANGE UP (ref 4.2–5.8)
RBC # FLD: 13.7 % — SIGNIFICANT CHANGE UP (ref 10.3–14.5)
SODIUM SERPL-SCNC: 143 MMOL/L — SIGNIFICANT CHANGE UP (ref 135–145)
WBC # BLD: 9.1 K/UL — SIGNIFICANT CHANGE UP (ref 3.8–10.5)
WBC # FLD AUTO: 9.1 K/UL — SIGNIFICANT CHANGE UP (ref 3.8–10.5)

## 2017-12-21 PROCEDURE — 99239 HOSP IP/OBS DSCHRG MGMT >30: CPT

## 2017-12-21 PROCEDURE — 83605 ASSAY OF LACTIC ACID: CPT

## 2017-12-21 PROCEDURE — 71045 X-RAY EXAM CHEST 1 VIEW: CPT

## 2017-12-21 PROCEDURE — 85027 COMPLETE CBC AUTOMATED: CPT

## 2017-12-21 PROCEDURE — 96375 TX/PRO/DX INJ NEW DRUG ADDON: CPT

## 2017-12-21 PROCEDURE — 87798 DETECT AGENT NOS DNA AMP: CPT

## 2017-12-21 PROCEDURE — 94640 AIRWAY INHALATION TREATMENT: CPT

## 2017-12-21 PROCEDURE — 93970 EXTREMITY STUDY: CPT

## 2017-12-21 PROCEDURE — 84145 PROCALCITONIN (PCT): CPT

## 2017-12-21 PROCEDURE — 96365 THER/PROPH/DIAG IV INF INIT: CPT

## 2017-12-21 PROCEDURE — 94760 N-INVAS EAR/PLS OXIMETRY 1: CPT

## 2017-12-21 PROCEDURE — 93005 ELECTROCARDIOGRAM TRACING: CPT

## 2017-12-21 PROCEDURE — 99221 1ST HOSP IP/OBS SF/LOW 40: CPT

## 2017-12-21 PROCEDURE — 87633 RESP VIRUS 12-25 TARGETS: CPT

## 2017-12-21 PROCEDURE — 97162 PT EVAL MOD COMPLEX 30 MIN: CPT

## 2017-12-21 PROCEDURE — 87486 CHLMYD PNEUM DNA AMP PROBE: CPT

## 2017-12-21 PROCEDURE — 94664 DEMO&/EVAL PT USE INHALER: CPT

## 2017-12-21 PROCEDURE — 97530 THERAPEUTIC ACTIVITIES: CPT

## 2017-12-21 PROCEDURE — 99231 SBSQ HOSP IP/OBS SF/LOW 25: CPT

## 2017-12-21 PROCEDURE — 99285 EMERGENCY DEPT VISIT HI MDM: CPT | Mod: 25

## 2017-12-21 PROCEDURE — 85610 PROTHROMBIN TIME: CPT

## 2017-12-21 PROCEDURE — 96376 TX/PRO/DX INJ SAME DRUG ADON: CPT

## 2017-12-21 PROCEDURE — 93306 TTE W/DOPPLER COMPLETE: CPT

## 2017-12-21 PROCEDURE — 96367 TX/PROPH/DG ADDL SEQ IV INF: CPT

## 2017-12-21 PROCEDURE — 87581 M.PNEUMON DNA AMP PROBE: CPT

## 2017-12-21 PROCEDURE — 96372 THER/PROPH/DIAG INJ SC/IM: CPT | Mod: 59

## 2017-12-21 PROCEDURE — 97116 GAIT TRAINING THERAPY: CPT

## 2017-12-21 PROCEDURE — 83880 ASSAY OF NATRIURETIC PEPTIDE: CPT

## 2017-12-21 PROCEDURE — 80048 BASIC METABOLIC PNL TOTAL CA: CPT

## 2017-12-21 PROCEDURE — 87040 BLOOD CULTURE FOR BACTERIA: CPT

## 2017-12-21 PROCEDURE — 82962 GLUCOSE BLOOD TEST: CPT

## 2017-12-21 PROCEDURE — 83036 HEMOGLOBIN GLYCOSYLATED A1C: CPT

## 2017-12-21 PROCEDURE — 80053 COMPREHEN METABOLIC PANEL: CPT

## 2017-12-21 PROCEDURE — 85730 THROMBOPLASTIN TIME PARTIAL: CPT

## 2017-12-21 RX ADMIN — Medication 1: at 18:15

## 2017-12-21 RX ADMIN — Medication 3 MILLILITER(S): at 07:45

## 2017-12-21 RX ADMIN — Medication 81 MILLIGRAM(S): at 11:20

## 2017-12-21 RX ADMIN — CLOPIDOGREL BISULFATE 75 MILLIGRAM(S): 75 TABLET, FILM COATED ORAL at 11:20

## 2017-12-21 RX ADMIN — Medication 3 MILLILITER(S): at 13:29

## 2017-12-21 RX ADMIN — Medication 12.5 MILLIGRAM(S): at 05:34

## 2017-12-21 RX ADMIN — VALSARTAN 160 MILLIGRAM(S): 80 TABLET ORAL at 05:34

## 2017-12-21 RX ADMIN — Medication 40 MILLIGRAM(S): at 05:34

## 2017-12-21 RX ADMIN — Medication 3 MILLILITER(S): at 01:36

## 2017-12-21 RX ADMIN — Medication 0.5 MILLIGRAM(S): at 07:45

## 2017-12-21 RX ADMIN — Medication 4: at 12:18

## 2017-12-21 RX ADMIN — Medication 1: at 09:51

## 2017-12-21 RX ADMIN — HEPARIN SODIUM 5000 UNIT(S): 5000 INJECTION INTRAVENOUS; SUBCUTANEOUS at 05:34

## 2017-12-21 RX ADMIN — FAMOTIDINE 20 MILLIGRAM(S): 10 INJECTION INTRAVENOUS at 11:20

## 2017-12-21 NOTE — PROGRESS NOTE ADULT - SUBJECTIVE AND OBJECTIVE BOX
VITALS/LABS  12/21/2017 afeb 85 113/50 18 97%   12/21/2017 W 9.1 Hb 12.4 Plt 239 Na 143 K 4.4 CO2 36 Cr 1.1   REVIEW OF SYMPTOMS    Able to give ROS  Yes     RELIABLE No   CONSTITUTIONAL Weakness Yes  Chills No Vision changes No  ENDOCRINE No unexplained hair loss No heat or cold intolerance    ALLERGY No hives  Sore throat No   RESP Coughing blood No  Shortness of breath YES   NEURO No Headache  Confusion Pain neck No   CARDIAC No Chest pain No Palpitations   GI No Pain abdomen NO   Vomiting NO   PHYSICAL EXAM    HEENT Unremarkable PERRLA atraumatic   RESP Fair air entry EXP prolonged    Harsh breath sound Resp distres mild   CARDIAC S1 S2 No S3     NO JVD    ABDOMEN SOFT BS PRESENT NOT DISTENDED No hepatosplenomegaly PEDAL EDEMA present No calf tenderness  NO rash   GENERAL Not TOXIC looking  HOSPITAL COURSE PROBLEM ASSESSMENT PLAN  12/18/2017 ADMISSION [facility]        NEED FOR HOME O2  12/20 RA resp 90       On 12/21 RA pulse ox dropped to 66% on ambulating in RA and he will need home O2 2l/min 24/7 portable and stationary for copd till i reevaluate him in office                                     LEG PAIN  12/18 V duplex n  RESP  O2 Monitor PO   COPD EX  solumed 40.3 (12/18) --> Pred 40 (12/20)   DUONEB.4P (12/18) --> Duoneb.6 (12/18) Pulmicort (12/18)  LACTICEMIA   12/18/2017 LA 2.2   PNEUMONIA RESP TRACT INFECTION   12/18/2017 pct n 12/18/2017 RVP n 12/18 bc n   12/18/2017 CXR COPD nsc 7/6/2017    On azithro (12/18-12/20) Rocephin (12/18-12/20)   CAD   On ASA 81 (12/18) Plavix 75 (12/18) atorvastat 40 (12/18)   HYPERTENSION  Norvasc 5 (12/18) HCTZ 12.5 (12/19) Valsartan 160 (12/19)    MERRILL  12/18-12/20 Cr 1.5-1.1  DM   iss (12/18)   GLOBAL ISSUE/BEST PRACTICE:        PROBLEM: Analgesia:     na                        PROBLEM: Sedation:     na               PROBLEM: HOB elevation:   y             PROBLEM: Stress ulcer proph:    na                      PROBLEM: VTE prophylaxis:      hsc (12/18)                   PROBLEM: Glycemic control:    iss (12/18)    PROBLEM: Nutrition:    cons carb (12/18)           PROBLEM: Advanced directive: na     PROBLEM: Allergies:  na      TIME SPENT Over 25 minutes aggregate care time spent on encounter; activities included   direct patient care, counseling and/or coordinating care reviewing notes, lab data/ imaging , discussion with multidisciplinary team/ patient  /family. Risks, benefits, alternatives  discussed in detail. Proper antibiotic use issues addressed Questions/concerns  were addressed  as  best as possible   SUMMARY ASSESSMENT PLAN 12/18/2017 ADMISSION [facility]   12/18/2017 ADMISSION [facility]                                                                 HPI 12/18/2017 ADMISSION [facility]                                                                                           Patient is a 78 yo male former smoker quit 30 y Not on home O2 Had home nebs utd with flu and pneumonia vaccinations with pmhx of COPD, PVD, CAD, CABG DMII, HTN, HLD Pilonidal cyst admitted [facility] 12/18/2017  with COPD exacerbation.   HOSPITAL COURSE 12/18/2017 ADMISSION [facility]                                                               Pt was felt to be dyspneic sec COPD ex RVP as well as Pct were neg Pt was managed with azithro BD ICS and IVCS  DM was managed with iss   PROBLEM ASSESSMENT PLAN  12/18/2017 ADMISSION [facility]                                                                    LEG PAIN  12/18 V duplex n  RESP  Given suppl O2 Monitor PO   On 12/21 RA pulse ox dropped to 66% on ambulating in RA and he will need home O2 2l/min 24/7 portable and stationary for copd till i reevaluate him in office   COPD EX  Rxed with IVCS IBD ICS   LACTICEMIA   12/18/2017 Managed with IVF   PNEUMONIA RESP TRACT INFECTION   No clear evidence of bact pneumonia Kept on azithro (12/18) Rocephin (12/18) pending bc then DCed   CAD   Kept on ASA 81 (12/18) Plavix 75 (12/18) atorvastat 40 (12/18) No evidence active ongoing ischemia

## 2017-12-23 LAB
CULTURE RESULTS: SIGNIFICANT CHANGE UP
CULTURE RESULTS: SIGNIFICANT CHANGE UP
SPECIMEN SOURCE: SIGNIFICANT CHANGE UP
SPECIMEN SOURCE: SIGNIFICANT CHANGE UP

## 2018-01-20 ENCOUNTER — INPATIENT (INPATIENT)
Facility: HOSPITAL | Age: 78
LOS: 2 days | Discharge: HOME | DRG: 189 | End: 2018-01-23
Attending: STUDENT IN AN ORGANIZED HEALTH CARE EDUCATION/TRAINING PROGRAM | Admitting: INTERNAL MEDICINE
Payer: COMMERCIAL

## 2018-01-20 VITALS — WEIGHT: 210.1 LBS | HEIGHT: 72 IN

## 2018-01-20 DIAGNOSIS — J96.22 ACUTE AND CHRONIC RESPIRATORY FAILURE WITH HYPERCAPNIA: ICD-10-CM

## 2018-01-20 DIAGNOSIS — J44.1 CHRONIC OBSTRUCTIVE PULMONARY DISEASE WITH (ACUTE) EXACERBATION: ICD-10-CM

## 2018-01-20 DIAGNOSIS — I10 ESSENTIAL (PRIMARY) HYPERTENSION: ICD-10-CM

## 2018-01-20 DIAGNOSIS — I25.10 ATHEROSCLEROTIC HEART DISEASE OF NATIVE CORONARY ARTERY WITHOUT ANGINA PECTORIS: ICD-10-CM

## 2018-01-20 DIAGNOSIS — Z29.9 ENCOUNTER FOR PROPHYLACTIC MEASURES, UNSPECIFIED: ICD-10-CM

## 2018-01-20 DIAGNOSIS — N40.0 BENIGN PROSTATIC HYPERPLASIA WITHOUT LOWER URINARY TRACT SYMPTOMS: ICD-10-CM

## 2018-01-20 DIAGNOSIS — E11.9 TYPE 2 DIABETES MELLITUS WITHOUT COMPLICATIONS: ICD-10-CM

## 2018-01-20 DIAGNOSIS — J96.21 ACUTE AND CHRONIC RESPIRATORY FAILURE WITH HYPOXIA: ICD-10-CM

## 2018-01-20 DIAGNOSIS — E78.5 HYPERLIPIDEMIA, UNSPECIFIED: ICD-10-CM

## 2018-01-20 LAB
ALBUMIN SERPL ELPH-MCNC: 3.5 G/DL — SIGNIFICANT CHANGE UP (ref 3.3–5)
ALP SERPL-CCNC: 80 U/L — SIGNIFICANT CHANGE UP (ref 40–120)
ALT FLD-CCNC: 25 U/L — SIGNIFICANT CHANGE UP (ref 12–78)
ANION GAP SERPL CALC-SCNC: 7 MMOL/L — SIGNIFICANT CHANGE UP (ref 5–17)
APTT BLD: 33.7 SEC — SIGNIFICANT CHANGE UP (ref 27.5–37.4)
AST SERPL-CCNC: 20 U/L — SIGNIFICANT CHANGE UP (ref 15–37)
BASE EXCESS BLDA CALC-SCNC: 6.4 MMOL/L — HIGH (ref -2–2)
BASE EXCESS BLDA CALC-SCNC: 6.7 MMOL/L — HIGH (ref -2–2)
BASE EXCESS BLDA CALC-SCNC: 7.9 MMOL/L — HIGH (ref -2–2)
BASOPHILS # BLD AUTO: 0.1 K/UL — SIGNIFICANT CHANGE UP (ref 0–0.2)
BASOPHILS NFR BLD AUTO: 1.3 % — SIGNIFICANT CHANGE UP (ref 0–2)
BILIRUB SERPL-MCNC: 0.5 MG/DL — SIGNIFICANT CHANGE UP (ref 0.2–1.2)
BLOOD GAS COMMENTS ARTERIAL: SIGNIFICANT CHANGE UP
BUN SERPL-MCNC: 16 MG/DL — SIGNIFICANT CHANGE UP (ref 7–23)
CALCIUM SERPL-MCNC: 9.3 MG/DL — SIGNIFICANT CHANGE UP (ref 8.5–10.1)
CHLORIDE SERPL-SCNC: 97 MMOL/L — SIGNIFICANT CHANGE UP (ref 96–108)
CK SERPL-CCNC: 72 U/L — SIGNIFICANT CHANGE UP (ref 26–308)
CO2 SERPL-SCNC: 31 MMOL/L — SIGNIFICANT CHANGE UP (ref 22–31)
CREAT SERPL-MCNC: 1.3 MG/DL — SIGNIFICANT CHANGE UP (ref 0.5–1.3)
EOSINOPHIL # BLD AUTO: 0.4 K/UL — SIGNIFICANT CHANGE UP (ref 0–0.5)
EOSINOPHIL NFR BLD AUTO: 4.2 % — SIGNIFICANT CHANGE UP (ref 0–6)
GLUCOSE SERPL-MCNC: 225 MG/DL — HIGH (ref 70–99)
HCO3 BLDA-SCNC: 28 MMOL/L — HIGH (ref 23–27)
HCO3 BLDA-SCNC: 28 MMOL/L — HIGH (ref 23–27)
HCO3 BLDA-SCNC: 30 MMOL/L — HIGH (ref 23–27)
HCT VFR BLD CALC: 41.9 % — SIGNIFICANT CHANGE UP (ref 39–50)
HGB BLD-MCNC: 13.1 G/DL — SIGNIFICANT CHANGE UP (ref 13–17)
HOROWITZ INDEX BLDA+IHG-RTO: 30 — SIGNIFICANT CHANGE UP
HOROWITZ INDEX BLDA+IHG-RTO: 40 — SIGNIFICANT CHANGE UP
HOROWITZ INDEX BLDA+IHG-RTO: SIGNIFICANT CHANGE UP
INR BLD: 0.96 RATIO — SIGNIFICANT CHANGE UP (ref 0.88–1.16)
LACTATE SERPL-SCNC: 1.9 MMOL/L — SIGNIFICANT CHANGE UP (ref 0.7–2)
LYMPHOCYTES # BLD AUTO: 1 K/UL — SIGNIFICANT CHANGE UP (ref 1–3.3)
LYMPHOCYTES # BLD AUTO: 9.4 % — LOW (ref 13–44)
MCHC RBC-ENTMCNC: 28.2 PG — SIGNIFICANT CHANGE UP (ref 27–34)
MCHC RBC-ENTMCNC: 31.2 GM/DL — LOW (ref 32–36)
MCV RBC AUTO: 90.2 FL — SIGNIFICANT CHANGE UP (ref 80–100)
MONOCYTES # BLD AUTO: 0.6 K/UL — SIGNIFICANT CHANGE UP (ref 0–0.9)
MONOCYTES NFR BLD AUTO: 5.7 % — SIGNIFICANT CHANGE UP (ref 1–9)
NEUTROPHILS # BLD AUTO: 8.4 K/UL — HIGH (ref 1.8–7.4)
NEUTROPHILS NFR BLD AUTO: 79.4 % — HIGH (ref 43–77)
PCO2 BLDA: 68 MMHG — HIGH (ref 32–46)
PCO2 BLDA: 86 MMHG — CRITICAL HIGH (ref 32–46)
PCO2 BLDA: 87 MMHG — CRITICAL HIGH (ref 32–46)
PH BLDA: 7.22 — LOW (ref 7.35–7.45)
PH BLDA: 7.22 — LOW (ref 7.35–7.45)
PH BLDA: 7.32 — LOW (ref 7.35–7.45)
PLATELET # BLD AUTO: 208 K/UL — SIGNIFICANT CHANGE UP (ref 150–400)
PO2 BLDA: 112 MMHG — HIGH (ref 74–108)
PO2 BLDA: 95 MMHG — SIGNIFICANT CHANGE UP (ref 74–108)
PO2 BLDA: 98 MMHG — SIGNIFICANT CHANGE UP (ref 74–108)
POTASSIUM SERPL-MCNC: 5.1 MMOL/L — SIGNIFICANT CHANGE UP (ref 3.5–5.3)
POTASSIUM SERPL-SCNC: 5.1 MMOL/L — SIGNIFICANT CHANGE UP (ref 3.5–5.3)
PROT SERPL-MCNC: 6.9 G/DL — SIGNIFICANT CHANGE UP (ref 6–8.3)
PROTHROM AB SERPL-ACNC: 10.4 SEC — SIGNIFICANT CHANGE UP (ref 9.8–12.7)
RAPID RVP RESULT: SIGNIFICANT CHANGE UP
RBC # BLD: 4.64 M/UL — SIGNIFICANT CHANGE UP (ref 4.2–5.8)
RBC # FLD: 13.6 % — SIGNIFICANT CHANGE UP (ref 10.3–14.5)
SAO2 % BLDA: 97 % — HIGH (ref 92–96)
SAO2 % BLDA: 97 % — HIGH (ref 92–96)
SAO2 % BLDA: 98 % — HIGH (ref 92–96)
SODIUM SERPL-SCNC: 135 MMOL/L — SIGNIFICANT CHANGE UP (ref 135–145)
THEOPHYLLINE SERPL-MCNC: <1 UG/ML — LOW (ref 10–20)
TROPONIN I SERPL-MCNC: <.015 NG/ML — SIGNIFICANT CHANGE UP (ref 0.01–0.04)
WBC # BLD: 10.5 K/UL — SIGNIFICANT CHANGE UP (ref 3.8–10.5)
WBC # FLD AUTO: 10.5 K/UL — SIGNIFICANT CHANGE UP (ref 3.8–10.5)

## 2018-01-20 PROCEDURE — 71045 X-RAY EXAM CHEST 1 VIEW: CPT | Mod: 26

## 2018-01-20 PROCEDURE — 93010 ELECTROCARDIOGRAM REPORT: CPT

## 2018-01-20 PROCEDURE — 99221 1ST HOSP IP/OBS SF/LOW 40: CPT

## 2018-01-20 PROCEDURE — 99223 1ST HOSP IP/OBS HIGH 75: CPT | Mod: AI,GC

## 2018-01-20 PROCEDURE — 99285 EMERGENCY DEPT VISIT HI MDM: CPT

## 2018-01-20 RX ORDER — CEFTRIAXONE 500 MG/1
1 INJECTION, POWDER, FOR SOLUTION INTRAMUSCULAR; INTRAVENOUS EVERY 24 HOURS
Qty: 0 | Refills: 0 | Status: DISCONTINUED | OUTPATIENT
Start: 2018-01-21 | End: 2018-01-22

## 2018-01-20 RX ORDER — TAMSULOSIN HYDROCHLORIDE 0.4 MG/1
0.4 CAPSULE ORAL AT BEDTIME
Qty: 0 | Refills: 0 | Status: DISCONTINUED | OUTPATIENT
Start: 2018-01-20 | End: 2018-01-23

## 2018-01-20 RX ORDER — CEFTRIAXONE 500 MG/1
1 INJECTION, POWDER, FOR SOLUTION INTRAMUSCULAR; INTRAVENOUS ONCE
Qty: 0 | Refills: 0 | Status: DISCONTINUED | OUTPATIENT
Start: 2018-01-20 | End: 2018-01-20

## 2018-01-20 RX ORDER — DEXTROSE 50 % IN WATER 50 %
25 SYRINGE (ML) INTRAVENOUS ONCE
Qty: 0 | Refills: 0 | Status: DISCONTINUED | OUTPATIENT
Start: 2018-01-20 | End: 2018-01-23

## 2018-01-20 RX ORDER — GLUCAGON INJECTION, SOLUTION 0.5 MG/.1ML
1 INJECTION, SOLUTION SUBCUTANEOUS ONCE
Qty: 0 | Refills: 0 | Status: DISCONTINUED | OUTPATIENT
Start: 2018-01-20 | End: 2018-01-23

## 2018-01-20 RX ORDER — ROFLUMILAST 500 UG/1
1 TABLET ORAL
Qty: 0 | Refills: 0 | COMMUNITY

## 2018-01-20 RX ORDER — THEOPHYLLINE ANHYDROUS 200 MG
1 TABLET, EXTENDED RELEASE 12 HR ORAL
Qty: 0 | Refills: 0 | COMMUNITY

## 2018-01-20 RX ORDER — IPRATROPIUM/ALBUTEROL SULFATE 18-103MCG
3 AEROSOL WITH ADAPTER (GRAM) INHALATION EVERY 4 HOURS
Qty: 0 | Refills: 0 | Status: DISCONTINUED | OUTPATIENT
Start: 2018-01-20 | End: 2018-01-23

## 2018-01-20 RX ORDER — DEXTROSE 50 % IN WATER 50 %
25 SYRINGE (ML) INTRAVENOUS ONCE
Qty: 0 | Refills: 0 | Status: DISCONTINUED | OUTPATIENT
Start: 2018-01-20 | End: 2018-01-20

## 2018-01-20 RX ORDER — GLUCAGON INJECTION, SOLUTION 0.5 MG/.1ML
1 INJECTION, SOLUTION SUBCUTANEOUS ONCE
Qty: 0 | Refills: 0 | Status: DISCONTINUED | OUTPATIENT
Start: 2018-01-20 | End: 2018-01-20

## 2018-01-20 RX ORDER — BUDESONIDE, MICRONIZED 100 %
0.5 POWDER (GRAM) MISCELLANEOUS
Qty: 0 | Refills: 0 | Status: DISCONTINUED | OUTPATIENT
Start: 2018-01-20 | End: 2018-01-23

## 2018-01-20 RX ORDER — AZITHROMYCIN 500 MG/1
500 TABLET, FILM COATED ORAL EVERY 24 HOURS
Qty: 0 | Refills: 0 | Status: DISCONTINUED | OUTPATIENT
Start: 2018-01-21 | End: 2018-01-22

## 2018-01-20 RX ORDER — AZITHROMYCIN 500 MG/1
500 TABLET, FILM COATED ORAL ONCE
Qty: 0 | Refills: 0 | Status: COMPLETED | OUTPATIENT
Start: 2018-01-20 | End: 2018-01-20

## 2018-01-20 RX ORDER — CEFTRIAXONE 500 MG/1
2 INJECTION, POWDER, FOR SOLUTION INTRAMUSCULAR; INTRAVENOUS EVERY 24 HOURS
Qty: 0 | Refills: 0 | Status: DISCONTINUED | OUTPATIENT
Start: 2018-01-20 | End: 2018-01-20

## 2018-01-20 RX ORDER — DEXTROSE 50 % IN WATER 50 %
1 SYRINGE (ML) INTRAVENOUS ONCE
Qty: 0 | Refills: 0 | Status: DISCONTINUED | OUTPATIENT
Start: 2018-01-20 | End: 2018-01-20

## 2018-01-20 RX ORDER — INSULIN LISPRO 100/ML
VIAL (ML) SUBCUTANEOUS
Qty: 0 | Refills: 0 | Status: DISCONTINUED | OUTPATIENT
Start: 2018-01-20 | End: 2018-01-20

## 2018-01-20 RX ORDER — SODIUM CHLORIDE 9 MG/ML
1000 INJECTION, SOLUTION INTRAVENOUS
Qty: 0 | Refills: 0 | Status: DISCONTINUED | OUTPATIENT
Start: 2018-01-20 | End: 2018-01-20

## 2018-01-20 RX ORDER — VALSARTAN 80 MG/1
160 TABLET ORAL DAILY
Qty: 0 | Refills: 0 | Status: DISCONTINUED | OUTPATIENT
Start: 2018-01-20 | End: 2018-01-23

## 2018-01-20 RX ORDER — INSULIN LISPRO 100/ML
VIAL (ML) SUBCUTANEOUS EVERY 6 HOURS
Qty: 0 | Refills: 0 | Status: DISCONTINUED | OUTPATIENT
Start: 2018-01-20 | End: 2018-01-21

## 2018-01-20 RX ORDER — IPRATROPIUM/ALBUTEROL SULFATE 18-103MCG
3 AEROSOL WITH ADAPTER (GRAM) INHALATION ONCE
Qty: 0 | Refills: 0 | Status: COMPLETED | OUTPATIENT
Start: 2018-01-20 | End: 2018-01-20

## 2018-01-20 RX ORDER — CEFTRIAXONE 500 MG/1
1 INJECTION, POWDER, FOR SOLUTION INTRAMUSCULAR; INTRAVENOUS ONCE
Qty: 0 | Refills: 0 | Status: COMPLETED | OUTPATIENT
Start: 2018-01-20 | End: 2018-01-20

## 2018-01-20 RX ORDER — DEXTROSE 50 % IN WATER 50 %
12.5 SYRINGE (ML) INTRAVENOUS ONCE
Qty: 0 | Refills: 0 | Status: DISCONTINUED | OUTPATIENT
Start: 2018-01-20 | End: 2018-01-20

## 2018-01-20 RX ORDER — ATORVASTATIN CALCIUM 80 MG/1
40 TABLET, FILM COATED ORAL AT BEDTIME
Qty: 0 | Refills: 0 | Status: DISCONTINUED | OUTPATIENT
Start: 2018-01-20 | End: 2018-01-23

## 2018-01-20 RX ORDER — DEXTROSE 50 % IN WATER 50 %
1 SYRINGE (ML) INTRAVENOUS ONCE
Qty: 0 | Refills: 0 | Status: DISCONTINUED | OUTPATIENT
Start: 2018-01-20 | End: 2018-01-23

## 2018-01-20 RX ORDER — ENOXAPARIN SODIUM 100 MG/ML
40 INJECTION SUBCUTANEOUS DAILY
Qty: 0 | Refills: 0 | Status: DISCONTINUED | OUTPATIENT
Start: 2018-01-20 | End: 2018-01-23

## 2018-01-20 RX ORDER — DEXTROSE 50 % IN WATER 50 %
12.5 SYRINGE (ML) INTRAVENOUS ONCE
Qty: 0 | Refills: 0 | Status: DISCONTINUED | OUTPATIENT
Start: 2018-01-20 | End: 2018-01-23

## 2018-01-20 RX ORDER — INSULIN LISPRO 100/ML
VIAL (ML) SUBCUTANEOUS AT BEDTIME
Qty: 0 | Refills: 0 | Status: DISCONTINUED | OUTPATIENT
Start: 2018-01-20 | End: 2018-01-20

## 2018-01-20 RX ORDER — SODIUM CHLORIDE 9 MG/ML
1000 INJECTION, SOLUTION INTRAVENOUS
Qty: 0 | Refills: 0 | Status: DISCONTINUED | OUTPATIENT
Start: 2018-01-20 | End: 2018-01-22

## 2018-01-20 RX ORDER — SODIUM CHLORIDE 9 MG/ML
1000 INJECTION, SOLUTION INTRAVENOUS
Qty: 0 | Refills: 0 | Status: DISCONTINUED | OUTPATIENT
Start: 2018-01-20 | End: 2018-01-23

## 2018-01-20 RX ORDER — MULTIVIT-MIN/FERROUS GLUCONATE 9 MG/15 ML
1 LIQUID (ML) ORAL DAILY
Qty: 0 | Refills: 0 | Status: DISCONTINUED | OUTPATIENT
Start: 2018-01-20 | End: 2018-01-23

## 2018-01-20 RX ORDER — CEFTRIAXONE 500 MG/1
INJECTION, POWDER, FOR SOLUTION INTRAMUSCULAR; INTRAVENOUS
Qty: 0 | Refills: 0 | Status: DISCONTINUED | OUTPATIENT
Start: 2018-01-20 | End: 2018-01-20

## 2018-01-20 RX ORDER — CLOPIDOGREL BISULFATE 75 MG/1
75 TABLET, FILM COATED ORAL DAILY
Qty: 0 | Refills: 0 | Status: DISCONTINUED | OUTPATIENT
Start: 2018-01-20 | End: 2018-01-23

## 2018-01-20 RX ORDER — ASPIRIN/CALCIUM CARB/MAGNESIUM 324 MG
81 TABLET ORAL DAILY
Qty: 0 | Refills: 0 | Status: DISCONTINUED | OUTPATIENT
Start: 2018-01-20 | End: 2018-01-23

## 2018-01-20 RX ORDER — DEXTROSE MONOHYDRATE, SODIUM CHLORIDE, AND POTASSIUM CHLORIDE 50; .745; 4.5 G/1000ML; G/1000ML; G/1000ML
1000 INJECTION, SOLUTION INTRAVENOUS
Qty: 0 | Refills: 0 | Status: DISCONTINUED | OUTPATIENT
Start: 2018-01-20 | End: 2018-01-20

## 2018-01-20 RX ADMIN — Medication 0.5 MILLIGRAM(S): at 19:25

## 2018-01-20 RX ADMIN — DEXTROSE MONOHYDRATE, SODIUM CHLORIDE, AND POTASSIUM CHLORIDE 60 MILLILITER(S): 50; .745; 4.5 INJECTION, SOLUTION INTRAVENOUS at 19:14

## 2018-01-20 RX ADMIN — Medication 3 MILLILITER(S): at 10:00

## 2018-01-20 RX ADMIN — Medication 40 MILLIGRAM(S): at 18:50

## 2018-01-20 RX ADMIN — ENOXAPARIN SODIUM 40 MILLIGRAM(S): 100 INJECTION SUBCUTANEOUS at 23:05

## 2018-01-20 RX ADMIN — Medication 3 MILLILITER(S): at 19:25

## 2018-01-20 RX ADMIN — TAMSULOSIN HYDROCHLORIDE 0.4 MILLIGRAM(S): 0.4 CAPSULE ORAL at 23:05

## 2018-01-20 RX ADMIN — AZITHROMYCIN 255 MILLIGRAM(S): 500 TABLET, FILM COATED ORAL at 10:29

## 2018-01-20 RX ADMIN — ATORVASTATIN CALCIUM 40 MILLIGRAM(S): 80 TABLET, FILM COATED ORAL at 23:05

## 2018-01-20 RX ADMIN — Medication 3 MILLILITER(S): at 15:36

## 2018-01-20 RX ADMIN — Medication 2: at 23:14

## 2018-01-20 RX ADMIN — Medication 3 MILLILITER(S): at 23:10

## 2018-01-20 RX ADMIN — CEFTRIAXONE 100 GRAM(S): 500 INJECTION, POWDER, FOR SOLUTION INTRAMUSCULAR; INTRAVENOUS at 10:06

## 2018-01-20 RX ADMIN — Medication 4: at 16:18

## 2018-01-20 RX ADMIN — Medication 3 MILLILITER(S): at 09:51

## 2018-01-20 NOTE — H&P ADULT - NSHPOUTPATIENTPROVIDERS_GEN_ALL_CORE
[facility] PMD- Dr. Sarah Dejesus and another pulmonologist whose name pt could not recall

## 2018-01-20 NOTE — ED PROVIDER NOTE - CARE PLAN
Principal Discharge DX:	SOB (shortness of breath)  Secondary Diagnosis:	Wheezing Principal Discharge DX:	COPD exacerbation  Secondary Diagnosis:	Wheezing

## 2018-01-20 NOTE — H&P ADULT - PROBLEM SELECTOR PROBLEM 1
Acute on chronic respiratory failure with hypercapnia Acute on chronic respiratory failure with hypoxia and hypercapnia

## 2018-01-20 NOTE — H&P ADULT - HISTORY OF PRESENT ILLNESS
77M w/pmh of CAD s/p CABG, COPD on 2L home O2, DMII, Dyslipidemia, HTN, PVD presenting with sob for 1 day. Patient states that he had difficulty breathing this morning despite nebulizer treatment. He has a nonproductive cough. Patient was feeling well up until this morning. Patient admits to noncompliance of COPD maintenance medications. Denies fevers, chills, n/v, chest pain, palpitations, abdominal pain or muscle weakness. Denies recent sick contacts or travel. Patient was admitted to [facility] 1 month ago for similar complaints and discharged home with prednisone taper and home oxygen. Patient is a former smoker with 5 pack year history, many years ago. He was a daily marijuana user and quit 15 years ago.     In the ED, patient's vitals were: T98.6F, /58, , RR 18, SpO2 100% on Bipap. Significant labs include: Glucose 225. ABG 7.22/86/98/28. RVP negative. Patient given Azithromycin 500mg x1, Rocephin 1g x1, Duonebs x2.   CXR: No sign of lobar consolidation vascular congestion or effusion.  EKG: sinus tachycardia. unchanged from prior 12/18/17 76yo M w/ PMH of CAD s/p CABG, COPD (on 2L home O2), DMII, Dyslipidemia, HTN, PVD presenting with SOB x 2 days. Patient states symptoms started yesterday with mild SOB, but this morning he had much more difficulty breathing and wheezing despite nebulizer treatment. He has a nonproductive cough, which he does not believe has increased much from baseline. Patient admits to noncompliance with COPD maintenance medications as he thought they are not effective and has only been using the albuterol nebulizer. Pt denies fevers, chills, n/v, chest pain, palpitations, abdominal pain or muscle weakness. Denies recent sick contacts or travel. Patient was admitted to [facility] 1 month ago for similar complaints, treated for COPD exacerbation and discharged home with prednisone taper and home oxygen. Patient is a former smoker and quit "many years ago." He was a daily marijuana user and quit 15 years ago.     In the ED, patient's vitals were: T98.6F, /58, , RR 18, SpO2 100% on BiPAP. Significant labs include: Glucose 225. ABG 7.22/86/98/28/97% on 5L NC. RVP negative. Patient given Solu-medrol 125mg x1, Azithromycin 500mg x1, Rocephin 1g x1, Duonebs x2.   CXR: No sign of lobar consolidation vascular congestion or effusion.  EKG: sinus tachycardia at 125bpm, unchanged from prior on 12/18/17.

## 2018-01-20 NOTE — CONSULT NOTE ADULT - SUBJECTIVE AND OBJECTIVE BOX
Patient is a 77y old  Male who presents with a chief complaint of SOB  HPI:  76 yo male with h/o DM, CAD, COPD, s/p recent [facility] admission for COPD exascerbation 12/17 after which he was d/c'd on home nighttime CPAP and home O2 with a steroid taper that ended 1/11/18.  Over the last week he developed progressive SOB and productive cough initially treated successfully with nebulizer at home.  Over the last 48 hours his symptoms worsened and were not relieved by home tx.  He arrived to ED via ambulance on CPAP.  In ED, his breathing appeared stable and he was briefly removed from the CPAP machine and placed on FM O2.  A blood gas was drawn showing a pH of 7.22, pCO2 87, pO2 112.  He was placed on BIPAP and ICU was called to consult.    Allergies: No Known Allergies    PAST MEDICAL & SURGICAL HISTORY:  Hypertension  COPD (chronic obstructive pulmonary disease)  Osteomyelitis  Asymptomatic Peripheral Vascular Disease  Dyslipidemia  CAD (Coronary Artery Disease)  Diabetes Mellitus, Type II  CABG (Coronary Artery Bypass Graft)    FAMILY HISTORY:  Family history of diabetes mellitus (Sibling)    SOCIAL HISTORY:    Home Medications:    Review of Systems:  Constitutional: No fever, chills  Resp: No cough, wheezing, shortness of breath  CVS: No chest pain, palpitations, leg swelling  GI: No abdominal pain, nausea, vomiting, diarrhea     T(F): 98.6 (01-20-18 @ 09:48), Max: 98.6 (01-20-18 @ 09:48)  HR: 118 (01-20-18 @ 12:41) (116 - 123)  BP: 101/58 (01-20-18 @ 12:41) (101/58 - 106/58)  RR: 18 (01-20-18 @ 12:41) (18 - 25)  SpO2: 100% (01-20-18 @ 12:41) on BIPAP 12/5      Physical Exam:   BP: 101/57  HR:  121  RR16, unlabored on BIPAP  O2 sat 98% on Bipap    Gen:  Sleepy, but easily arousable and attends well, alert and oriented x3, comfortable on BIPAP  CVS:  Tachy, reg, no murmur audible  Resp:  coarse exp ronchi/wheeze with prolonged exp, no crackles  Abd:  soft NT  Ext:  trace bipedal edema, no clubbing    Bedside lung US:  Bilat A line predominant with scattered B lines, no effusion bilat        Meds:  azithromycin  IVPB 500 milliGRAM(s) IV Intermittent every 24 hours    tamsulosin 0.4 milliGRAM(s) Oral at bedtime  valsartan 160 milliGRAM(s) Oral daily     atorvastatin 40 milliGRAM(s) Oral at bedtime  methylPREDNISolone sodium succinate Injectable 40 milliGRAM(s) IV Push every 8 hours     ALBUTerol/ipratropium for Nebulization 3 milliLiter(s) Nebulizer every 4 hours  buDESOnide   0.5 milliGRAM(s) Respule 0.5 milliGRAM(s) Inhalation two times a day      aspirin enteric coated 81 milliGRAM(s) Oral daily  clopidogrel Tablet 75 milliGRAM(s) Oral daily     multivitamin/minerals 1 Tablet(s) Oral daily                                    13.1   10.5  )-----------( 208      ( 20 Jan 2018 10:12 )             41.9       01-20    135  |  97  |  16  ----------------------------<  225<H>  5.1   |  31  |  1.30    Ca    9.3      20 Jan 2018 10:12    TPro  6.9  /  Alb  3.5  /  TBili  0.5  /  DBili  x   /  AST  20  /  ALT  25  /  AlkPhos  80  01-20    Lactate 1.9           01-20 @ 10:12    PT/INR - ( 20 Jan 2018 10:12 )   PT: 10.4 sec;   INR: 0.96 ratio       PTT - ( 20 Jan 2018 10:12 )  PTT:33.7 sec    Rapid RVP Result: NotDetec (01-20 @ 10:12)    ABG - ( 20 Jan 2018 13:35 ) on FM (?50%)  pH: 7.22  /  pCO2: 87    /  pO2: 112   / HCO3: 28    / Base Excess: 6.7   /  SaO2: 98                Radiology: ***    Bedside lung ultrasound: ***    Bedside ECHO: ***    CODE STATUS: ***  [facility] discussion: Y  Critical care time spent (mins): *** Patient is a 77y old  Male who presents with a chief complaint of SOB  HPI:  78 yo male with h/o DM, CAD, COPD, s/p recent [facility] for COPD exascerbation 12/17 after which he was d/c'd on home nighttime CPAP and home O2 with a steroid taper that ended 1/11/18.  Over the last week he developed progressive SOB and productive cough initially treated successfully with nebulizer at home.  Over the last 48 hours his symptoms worsened and were not relieved by home tx.  He arrived to ED via ambulance on CPAP.  In ED, his breathing appeared stable and he was briefly removed from the CPAP machine and placed on FM O2.  A blood gas was drawn showing a pH of 7.22, pCO2 87, pO2 112.  He was placed on BIPAP and ICU was called to consult.    Allergies: No Known Allergies    PAST MEDICAL & SURGICAL HISTORY:  Hypertension  COPD (chronic obstructive pulmonary disease)  Osteomyelitis  Asymptomatic Peripheral Vascular Disease  Dyslipidemia  CAD (Coronary Artery Disease)  Diabetes Mellitus, Type II  CABG (Coronary Artery Bypass Graft)    FAMILY HISTORY:  Family history of diabetes mellitus (Sibling)    Review of Systems:  Constitutional: No fever, chills  Resp: No cough, wheezing, shortness of breath  CVS: No chest pain, palpitations, leg swelling  GI: No abdominal pain, nausea, vomiting, diarrhea     T(F): 98.6 (01-20-18 @ 09:48), Max: 98.6 (01-20-18 @ 09:48)  HR: 118 (01-20-18 @ 12:41) (116 - 123)  BP: 101/58 (01-20-18 @ 12:41) (101/58 - 106/58)  RR: 18 (01-20-18 @ 12:41) (18 - 25)  SpO2: 100% (01-20-18 @ 12:41) on BIPAP 12/5      Meds:  azithromycin  IVPB 500 milliGRAM(s) IV Intermittent every 24 hours    tamsulosin 0.4 milliGRAM(s) Oral at bedtime  valsartan 160 milliGRAM(s) Oral daily     atorvastatin 40 milliGRAM(s) Oral at bedtime  methylPREDNISolone sodium succinate Injectable 40 milliGRAM(s) IV Push every 8 hours     ALBUTerol/ipratropium for Nebulization 3 milliLiter(s) Nebulizer every 4 hours  buDESOnide   0.5 milliGRAM(s) Respule 0.5 milliGRAM(s) Inhalation two times a day      aspirin enteric coated 81 milliGRAM(s) Oral daily  clopidogrel Tablet 75 milliGRAM(s) Oral daily     multivitamin/minerals 1 Tablet(s) Oral daily                                 13.1   10.5  )-----------( 208      ( 20 Jan 2018 10:12 )             41.9       01-20    135  |  97  |  16  ----------------------------<  225<H>  5.1   |  31  |  1.30    Ca    9.3      20 Jan 2018 10:12    TPro  6.9  /  Alb  3.5  /  TBili  0.5  /  DBili  x   /  AST  20  /  ALT  25  /  AlkPhos  80  01-20    Lactate 1.9           01-20 @ 10:12    PT/INR - ( 20 Jan 2018 10:12 )   PT: 10.4 sec;   INR: 0.96 ratio       PTT - ( 20 Jan 2018 10:12 )  PTT:33.7 sec    Rapid RVP Result: NotDetec (01-20 @ 10:12)    ABG - ( 20 Jan 2018 13:35 ) on FM (?50%)  pH: 7.22  /  pCO2: 87    /  pO2: 112   / HCO3: 28    / Base Excess: 6.7   /  SaO2: 98          Physical Exam:   BP: 101/57  HR:  121  RR16, unlabored on BIPAP  O2 sat 98% on Bipap    Gen:  Sleepy, but easily arousable and attends well, alert and oriented x3, comfortable on BIPAP  CVS:  Tachy, reg, no murmur audible  Resp:  coarse exp ronchi/wheeze with prolonged exp, no crackles  Abd:  soft NT  Ext:  trace bipedal edema, no clubbing    Bedside lung US:  Bilat A line predominant with scattered B lines, no effusion bilat        Radiology:     < from: Xray Chest 1 View AP/PA (01.20.18 @ 09:58) >  Portable study, 9:50 AM    Comparison exam dated 12/18/2017.    Cardiac monitor leads present.Sternal sutures present. Flattening left   hemidiaphragm, unchanged. No sign of lobar consolidation vascular   congestion or effusion. Heart size within normal limits. Hilar regions,   mediastinal contours intact. No acute osseous abnormalities.    < end of copied text >    CODE STATUS: Full Patient is a 77y old  Male who presents with a chief complaint of SOB  HPI:  76 yo male with h/o DM, CAD, COPD, s/p [facility] for COPD exascerbation 12/17 after which he was d/c'd on home nighttime CPAP and home O2 with a steroid taper that ended 1/11/18.  Over the last week he developed progressive SOB and productive cough initially treated successfully with nebulizer at home.  Over the last 48 hours his symptoms worsened and were not relieved by home tx.  He arrived to ED via ambulance on CPAP.  In ED, his breathing appeared stable and he was briefly removed from the CPAP machine and placed on FM O2.  A blood gas was drawn showing a pH of 7.22, pCO2 87, pO2 112.  He was placed on BIPAP and ICU was called to consult.    Allergies: No Known Allergies    PAST MEDICAL & SURGICAL HISTORY:  Hypertension  COPD (chronic obstructive pulmonary disease)  Osteomyelitis  Asymptomatic Peripheral Vascular Disease  Dyslipidemia  CAD (Coronary Artery Disease)  Diabetes Mellitus, Type II  CABG (Coronary Artery Bypass Graft)    FAMILY HISTORY:  Family history of diabetes mellitus (Sibling)    Review of Systems:  Constitutional: No fever, chills  Resp: No cough, wheezing, shortness of breath  CVS: No chest pain, palpitations, leg swelling  GI: No abdominal pain, nausea, vomiting, diarrhea     T(F): 98.6 (01-20-18 @ 09:48), Max: 98.6 (01-20-18 @ 09:48)  HR: 118 (01-20-18 @ 12:41) (116 - 123)  BP: 101/58 (01-20-18 @ 12:41) (101/58 - 106/58)  RR: 18 (01-20-18 @ 12:41) (18 - 25)  SpO2: 100% (01-20-18 @ 12:41) on BIPAP 12/5      Meds:  azithromycin  IVPB 500 milliGRAM(s) IV Intermittent every 24 hours    tamsulosin 0.4 milliGRAM(s) Oral at bedtime  valsartan 160 milliGRAM(s) Oral daily     atorvastatin 40 milliGRAM(s) Oral at bedtime  methylPREDNISolone sodium succinate Injectable 40 milliGRAM(s) IV Push every 8 hours     ALBUTerol/ipratropium for Nebulization 3 milliLiter(s) Nebulizer every 4 hours  buDESOnide   0.5 milliGRAM(s) Respule 0.5 milliGRAM(s) Inhalation two times a day      aspirin enteric coated 81 milliGRAM(s) Oral daily  clopidogrel Tablet 75 milliGRAM(s) Oral daily     multivitamin/minerals 1 Tablet(s) Oral daily                                 13.1   10.5  )-----------( 208      ( 20 Jan 2018 10:12 )             41.9       01-20    135  |  97  |  16  ----------------------------<  225<H>  5.1   |  31  |  1.30    Ca    9.3      20 Jan 2018 10:12    TPro  6.9  /  Alb  3.5  /  TBili  0.5  /  DBili  x   /  AST  20  /  ALT  25  /  AlkPhos  80  01-20    Lactate 1.9           01-20 @ 10:12    PT/INR - ( 20 Jan 2018 10:12 )   PT: 10.4 sec;   INR: 0.96 ratio       PTT - ( 20 Jan 2018 10:12 )  PTT:33.7 sec    Rapid RVP Result: NotDetec (01-20 @ 10:12)    ABG - ( 20 Jan 2018 13:35 ) on FM (?50%)  pH: 7.22  /  pCO2: 87    /  pO2: 112   / HCO3: 28    / Base Excess: 6.7   /  SaO2: 98          Physical Exam:   BP: 101/57  HR:  121  RR16, unlabored on BIPAP  O2 sat 98% on Bipap    Gen:  easily arousable and attends well, alert and oriented x3, comfortable on BIPAP  CVS:  Tachy, reg, no murmur audible  Resp:  coarse exp ronchi/wheeze with prolonged exp, no crackles  Abd:  soft NT  Ext:  trace bipedal edema, no clubbing    Bedside lung US:  Bilat A line predominant with scattered B lines, no effusion bilat        Radiology:     < from: Xray Chest 1 View AP/PA (01.20.18 @ 09:58) >  Portable study, 9:50 AM    Comparison exam dated 12/18/2017.    Cardiac monitor leads present.Sternal sutures present. Flattening left   hemidiaphragm, unchanged. No sign of lobar consolidation vascular   congestion or effusion. Heart size within normal limits. Hilar regions,   mediastinal contours intact. No acute osseous abnormalities.    < end of copied text >    CODE STATUS: Full

## 2018-01-20 NOTE — H&P ADULT - ASSESSMENT
77M w/pmh of CAD s/p CABG, COPD on 2L home O2, DMII, Dyslipidemia, HTN, PVD presenting with sob admitted with acute on chronic hypercarbic respiratory failure. 78yo M w/ PMH of CAD s/p CABG, COPD (on 2L home O2), DMII, Dyslipidemia, HTN, PVD presenting with SOB x 2 days admitted with acute on chronic hypoxic and hypercarbic respiratory failure due to COPD exacerbation. 76yo M w/ PMH of CAD s/p CABG, COPD (on 2L home O2), DM2, Dyslipidemia, HTN, PVD presenting with SOB x 2 days admitted with acute on chronic hypoxic and hypercarbic respiratory failure due to COPD exacerbation.

## 2018-01-20 NOTE — ED ADULT TRIAGE NOTE - CHIEF COMPLAINT QUOTE
pt from home, c/o diff breathing, ems arrival pt in resp distress, # 18 placed by ems, give Solumedrol 125 mg IVP and duoneb, pt arrived on CPAP

## 2018-01-20 NOTE — CONSULT NOTE ADULT - SUBJECTIVE AND OBJECTIVE BOX
Past Medical History:  Asymptomatic Peripheral Vascular Disease    CAD (Coronary Artery Disease)    COPD (chronic obstructive pulmonary disease)    Diabetes Mellitus, Type II    Dyslipidemia    Hypertension    Osteomyelitis.     Past Surgical History:  CABG (Coronary Artery Bypass Graft).    PMH CABG CAD COPD DM2 Htn   VITALS/LABS  1/20/2018 W 10.5 Hb 13.1 Plt 208 INR .9 Na 135 K 5.1 CO2 31 Cr 1.3 G 225   1/20/2018 10a 5l 722/86/98   REVIEW OF SYMPTOMS    Able to give ROS  Yes     RELIABLE No   CONSTITUTIONAL Weakness Yes  Chills No Vision changes No  ENDOCRINE No unexplained hair loss No heat or cold intolerance    ALLERGY No hives  Sore throat No   RESP Coughing blood  Shortness of breath YES   NEURO No Headache  Confusion Pain neck No   CARDIAC No Chest pain No Palpitations   GI No Pain abdomen NO   Vomiting NO   PHYSICAL EXAM    HEENT Unremarkable PERRLA atraumatic   RESP Fair air entry EXP prolonged    Harsh breath sound Resp distres mild   CARDIAC S1 S2 No S3     NO JVD    ABDOMEN SOFT BS PRESENT NOT DISTENDED No hepatosplenomegaly PEDAL EDEMA present No calf tenderness  NO rash   GENERAL Not TOXIC looking  PATIENT DESCRIPTION [facility] SOCIAL  1/20/2018 ADMISSION [facility]   12/18-12/21/2017 HOSPITAL COURSE  [facility]                                                                                           Patient is a 76 yo male former smoker quit 30 y Not on home O2 Had home nebs utd with flu and pneumonia vaccinations with pmhx of COPD, PVD, CAD, CABG DMII, HTN, HLD Pilonidal cyst admitted [facility] 12/18-12/21/2017  with COPD exacerbation. RVP as well as Pct were neg Pt was managed with azithro BD ICS and IVCS  DM was managed with iss 12/18 V duplex n On 12/21 RA pulse ox dropped to 66% on ambulating in RA and home O2 was ordered  HPI 1/20/2018 ADMISSION [facility] P   77 m brought in by EMS on CPAP wheezing with hop radually progressive SOB x 1 week No fever cough chest pain abd pain   ER vitals 1/20/2018 106/58 120 afeb 99%   DC MEDS 12/21/2017 Tapering steroids ASA 81 Tamsulosin .4 metformin 1000.2 glipizide 2.5 atorvastat 40 diovan hct 160 12.5 plavix 75 jarod 400 incruse ellipta 62.5 breo 200 25 daliresp 500  ER MEDS GIVEN BY 1P 1/20 azithro rocephin duoneb    HOSPITAL COURSE PROBLEM ASSESSMENT PLAN 1/20/2018 ADMISSION [facility]                                       RESP INFECTION  1/20 RVP n   1/20/2018 W 10.5   1/20/2018 CXR COPD Sternal sutures Flattened l diaphragm   ACUTE HYPERCAPNIC RESP FAILURE   1/20/2018 10a 5l 722/86/98      1/20 Check ABG on BPAP    1/20/2018 1p 12/5/.4 722/87/112 --> 16/5/.3  COPD EX   1/20 BD Steroids  abio                 MERRILL   1/20 Cr 1.3      GLOBAL ISSUE/BEST PRACTICE:        PROBLEM: Analgesia:     na                        PROBLEM: Sedation:     na               PROBLEM: HOB elevation:   y             PROBLEM: Stress ulcer proph:    na                      PROBLEM: VTE prophylaxis:      hsc (1/20)                   PROBLEM: Glycemic control:    iss (1/20)    PROBLEM: Nutrition:    npo (1/20)           PROBLEM: Advanced directive: na     PROBLEM: Allergies:  na      TIME SPENT Over 55 minutes aggregate care time spent on encounter; activities included   direct patient care, counseling and/or coordinating care reviewing notes, lab data/ imaging , discussion with multidisciplinary team/ patient  /family. Risks, benefits, alternatives  discussed in detail. Proper antibiotic use issues addressed Questions/concerns  were addressed  as  best as possible

## 2018-01-20 NOTE — H&P ADULT - NSHPLABSRESULTS_GEN_ALL_CORE
CXR (personally reviewed): No sign of lobar consolidation vascular congestion or effusion.  EKG (personally reviewed): sinus tachycardia at 125bpm, unchanged from prior on 12/18/17.

## 2018-01-20 NOTE — PATIENT PROFILE ADULT. - PMH
CAD (Coronary Artery Disease)    COPD (chronic obstructive pulmonary disease)    Diabetes Mellitus, Type II    Dyslipidemia    Hypertension    Osteomyelitis    PVD (peripheral vascular disease)

## 2018-01-20 NOTE — H&P ADULT - ATTENDING COMMENTS
Pt seen + examined. Case discussed with intern/resident. Agree with H&P above (edited) with following addendum:  76yo M w/ PMH of CAD s/p CABG, COPD (on 2L home O2), DM2, Dyslipidemia, HTN, PVD presenting with SOB x 2 days admitted with acute on chronic hypoxic and hypercarbic respiratory failure due to COPD exacerbation.   -likely due to medication non-compliance as pt was not using any of his maintenance medications and only used rescue nebs  -discussed case with pulmonologist who knows pt from prior admissions, Dr. Dejesus  -given pt's significant decompensation with poor response to BiPAP 12/5 (increased to 16/5, by pulm), will aggressive treat for now with solu-medrol 40mg q8h, duonebs q4h, and empiric CAP coverage as per pulm recs -- f/up procalcitonin and consider CT Chest when pt stabilizes off BiPAP to r/out PNA and limit Abx  -RVP negative  -counseled pt and his wife on importance of using maintenance inhalers in the future to decrease chances of exacerbations.     Called PMD, Dr. Avila's office and left message regarding admission.

## 2018-01-20 NOTE — H&P ADULT - PROBLEM SELECTOR PLAN 1
Admit to telemetry  Likely 2/2 COPD exacerbation 2/2 medication noncompliance  On BiPap with improvement. Patient uses 2L home O2.   Continue duonebs q6  Solumedrol 40q8  Continuous pulse ox  Pulm- Dejesus Admit to telemetry  Likely 2/2 COPD exacerbation 2/2 medication noncompliance  On BiPap with improvement. Patient uses 2L home O2.   Continue duonebs q6  Solumedrol 40q8  Continue azithromycin and ceftriaxone  Continuous pulse ox  Pulrubens Dejesus Admit to telemetry  Likely 2/2 COPD exacerbation 2/2 medication noncompliance  On BiPap with improvement. Patient uses 2L home O2.   Continue duonebs q4  Solumedrol 40q8  Continue azithromycin and ceftriaxone  Follow up ABG   Continuous pulse ox  Pulm- Dejesus Likely due to COPD exacerbation due to medication noncompliance as pt stated he has not used anything but rescue nebs.  ICU was consulted in the ER and pt not deemed ICU candidate at this time -- admit to telemetry.  On BiPAP with improvement in symptoms, but ABG unchanged in several hours.   Pulmonology, Dr. Dejesus, who knows pt from past admissions, recommends duonebs q4h, solu-medrol 40mg q8h, and CAP Abx with rocephin/azithromycin for now until pt stabilizes.   Solumedrol 40q8  Continue azithromycin and ceftriaxone  Follow up ABG   Continuous pulse ox  Pulrubens Dejesus Likely due to COPD exacerbation due to medication noncompliance as pt stated he has not used anything, but rescue nebs.  ICU was consulted in the ER and pt not deemed ICU candidate at this time -- admit to telemetry.  On BiPAP with improvement in symptoms, but ABG unchanged in several hours.   Pulmonology, Dr. Dejesus, who knows pt from past admissions, recommends duonebs q4h, solu-medrol 40mg q8h, and empiric CAP Abx regimen with rocephin/azithromycin for now until pt stabilizes.   Follow up repeat ABG with change in BiPAP setting by pulm to 16/5  RVP negative  Continuous pulse ox  NPO for now while BiPAP dependent

## 2018-01-20 NOTE — ED PROVIDER NOTE - OBJECTIVE STATEMENT
pt bib ems on cpap for sob and wheezing, worsening x 1 week. pt given solumedrol 125mg iv and 1 neb treatment by ems. pt denies fevers, cough, cp, abd pain, n/v, edema, calf pain.  pmd-  olga ([facility])  pulm - arturo

## 2018-01-20 NOTE — H&P ADULT - NSHPREVIEWOFSYSTEMS_GEN_ALL_CORE
Constitutional: denies fever, chills, diaphoresis   HEENT: denies blurry vision, difficulty hearing  Respiratory: +SOB, +cough, denies LA, sputum production, wheezing  Cardiovascular: denies CP, palpitations  Gastrointestinal: denies nausea, vomiting, diarrhea, constipation, abdominal pain  Genitourinary: denies dysuria, frequency, urgency, hematuria   Skin: denies rash, itching  Musculoskeletal: denies myalgias, joint swelling, muscle weakness  Neurologic: denies headache, weakness, dizziness, numbness/tingling  Psychiatric: denies feeling anxious Constitutional: denies fever, chills, diaphoresis   HEENT: denies blurry vision, difficulty hearing  Respiratory: +SOB, +cough, +wheezing, denies LA, sputum production  Cardiovascular: denies CP, palpitations  Gastrointestinal: denies nausea, vomiting, diarrhea, constipation, abdominal pain  Genitourinary: denies dysuria, frequency, urgency, hematuria   Skin: denies rash, itching  Musculoskeletal: denies myalgias, joint swelling, muscle weakness  Neurologic: denies headache, weakness, dizziness, numbness/tingling  Psychiatric: denies anxiety

## 2018-01-20 NOTE — H&P ADULT - PROBLEM SELECTOR PLAN 5
Controlled. Continue valsartan. Continue valsartan.  Will hold HCTZ portion of home combination pill for now especially as pt is NPO. Monitor BP.

## 2018-01-20 NOTE — ED PROVIDER NOTE - MEDICAL DECISION MAKING DETAILS
pt with sob, wheezing worsening past week, given neb and steroids by ems - ekg/cxr/labs/nebs pt with sob, wheezing worsening past week, given neb and steroids by ems - ekg/cxr/labs/nebs/pulm

## 2018-01-20 NOTE — CONSULT NOTE ADULT - ATTENDING COMMENTS
76 yo M with COPD, CAD, DM, recent admission for COPD exacerbation, now with acute on chronic hypercapnic respiratory failure, likely from recurrent COPD exacerbation.  Pt alert and conversant, and ABG improving on BiPAP.  Hemodynamically stable.    Not an ICU candidate.    Agree with systemic steroids, bronchdilators, BiPAP prn.    Given his chronic hypercapnia should use BiPAP nightly.  Call if condition changes.

## 2018-01-20 NOTE — CONSULT NOTE ADULT - ASSESSMENT
76 yo man with h/o DM, CAD, COPD s/p recent admission for exascerbation and d/c'd on nighttime CPAP and daytime lowflow O2 PRN and steroid taper ending on 1/11/18 who presents to ED with hypercapnic respiratory failure which has responded clinically to BIPAP.  The initial ABG was done before he was placed on BIPAP.  Currently pt is alert and oriented, able to converse in short 5-6 word sentences, states he feels very much improved, is well oxygenated and hemodynamically stable with a sinus tachycardia and adequate BP.  His physical exam and bedside US do not suggest any element of CHF and this appears to be an exascerbation of COPD which has responded to ED care with BIPAP, steroids, bronchodilators.      He is not in need of ICU care at this time. He appears to be doing well on BIPAP and I agree with continuing BIPAP on telemetry.
Past Medical History:  Asymptomatic Peripheral Vascular Disease    CAD (Coronary Artery Disease)    COPD (chronic obstructive pulmonary disease)    Diabetes Mellitus, Type II    Dyslipidemia    Hypertension    Osteomyelitis.     Past Surgical History:  CABG (Coronary Artery Bypass Graft).    PMH CABG CAD COPD DM2 Htn   VITALS/LABS  1/20/2018 W 10.5 Hb 13.1 Plt 208 INR .9 Na 135 K 5.1 CO2 31 Cr 1.3 G 225   1/20/2018 10a 5l 722/86/98   REVIEW OF SYMPTOMS    Able to give ROS  Yes     RELIABLE No   CONSTITUTIONAL Weakness Yes  Chills No Vision changes No  ENDOCRINE No unexplained hair loss No heat or cold intolerance    ALLERGY No hives  Sore throat No   RESP Coughing blood  Shortness of breath YES   NEURO No Headache  Confusion Pain neck No   CARDIAC No Chest pain No Palpitations   GI No Pain abdomen NO   Vomiting NO   PHYSICAL EXAM    HEENT Unremarkable PERRLA atraumatic   RESP Fair air entry EXP prolonged    Harsh breath sound Resp distres mild   CARDIAC S1 S2 No S3     NO JVD    ABDOMEN SOFT BS PRESENT NOT DISTENDED No hepatosplenomegaly PEDAL EDEMA present No calf tenderness  NO rash   GENERAL Not TOXIC looking  PATIENT DESCRIPTION [facility] SOCIAL  1/20/2018 ADMISSION [facility]   12/18-12/21/2017 HOSPITAL COURSE  [facility]                                                                                           Patient is a 78 yo male former smoker quit 30 y Not on home O2 Had home nebs utd with flu and pneumonia vaccinations with pmhx of COPD, PVD, CAD, CABG DMII, HTN, HLD Pilonidal cyst admitted [facility] 12/18-12/21/2017  with COPD exacerbation. RVP as well as Pct were neg Pt was managed with azithro BD ICS and IVCS  DM was managed with iss 12/18 V duplex n On 12/21 RA pulse ox dropped to 66% on ambulating in RA and home O2 was ordered  HPI 1/20/2018 ADMISSION [facility]   77 m brought in by EMS on CPAP wheezing with hop radually progressive SOB x 1 week No fever cough chest pain abd pain   ER vitals 1/20/2018 106/58 120 afeb 99%   DC MEDS 12/21/2017 Tapering steroids ASA 81 Tamsulosin .4 metformin 1000.2 glipizide 2.5 atorvastat 40 diovan hct 160 12.5 plavix 75 jarod 400 incruse ellipta 62.5 breo 200 25 daliresp 500  ER MEDS GIVEN BY 1P 1/20 azithro rocephin duoneb    HOSPITAL COURSE PROBLEM ASSESSMENT PLAN 1/20/2018 ADMISSION [facility]                                       RESP INFECTION  1/20 RVP n   1/20/2018 W 10.5   1/20/2018 CXR COPD Sternal sutures Flattened l diaphragm   ACUTE HYPERCAPNIC RESP FAILURE   1/20/2018 10a 5l 722/86/98      1/20 Check ABG on BPAP    1/20/2018 1p 12/5/.4 722/87/112 --> 16/5/.3  COPD EX   1/20 BD Steroids  abio                 MERRILL   1/20 Cr 1.3      GLOBAL ISSUE/BEST PRACTICE:        PROBLEM: Analgesia:     na                        PROBLEM: Sedation:     na               PROBLEM: HOB elevation:   y             PROBLEM: Stress ulcer proph:    na                      PROBLEM: VTE prophylaxis:      hsc (1/20)                   PROBLEM: Glycemic control:    iss (1/20)    PROBLEM: Nutrition:    npo (1/20)           PROBLEM: Advanced directive: na     PROBLEM: Allergies:  na      TIME SPENT Over 55 minutes aggregate care time spent on encounter; activities included   direct patient care, counseling and/or coordinating care reviewing notes, lab data/ imaging , discussion with multidisciplinary team/ patient  /family. Risks, benefits, alternatives  discussed in detail. Proper antibiotic use issues addressed Questions/concerns  were addressed  as  best as possible

## 2018-01-20 NOTE — H&P ADULT - PROBLEM SELECTOR PLAN 7
Lovenox for DVT ppx    IMPROVE VTE Individual Risk Assessment          RISK                                                          Points  [  ] Previous VTE                                                3  [  ] Thrombophilia                                             2  [  ] Lower limb paralysis                                   2        (unable to hold up >15 seconds)    [  ] Current Cancer                                             2         (within 6 months)  [  ] Immobilization > 24 hrs                              1  [  ] ICU/CCU stay > 24 hours                             1  [x  ] Age > 60                                                         1    IMPROVE VTE Score: 1

## 2018-01-20 NOTE — H&P ADULT - PROBLEM SELECTOR PLAN 2
Uncontrolled  Hold oral antihyperglycemic agents  Start ISS  accuchecks  Follow up A1c Hold oral antihyperglycemic agents  Start HISS (q6h for now while NPO)  accuchecks  Follow up A1c

## 2018-01-20 NOTE — ED ADULT NURSE NOTE - OBJECTIVE STATEMENT
Presents to ER [facility]. Wife at bedside states pt was just discharged from this hospital 12/21 for COPD exacerbation. BS diminished bilat. 125 methypred and combivent given in bus.

## 2018-01-20 NOTE — H&P ADULT - PMH
Asymptomatic Peripheral Vascular Disease    CAD (Coronary Artery Disease)    COPD (chronic obstructive pulmonary disease)    Diabetes Mellitus, Type II    Dyslipidemia    Hypertension    Osteomyelitis CAD (Coronary Artery Disease)    COPD (chronic obstructive pulmonary disease)    Diabetes Mellitus, Type II    Dyslipidemia    Hypertension    Osteomyelitis    PVD (peripheral vascular disease)

## 2018-01-20 NOTE — H&P ADULT - NSHPSOCIALHISTORY_GEN_ALL_CORE
Lives with wife at home. Ambulates without assistance.   Tobacco: former smoker, 5 pack years  Alcohol: last used 20 years ago  Drugs: daily marijuana user 15 years ago Lives with wife at home. Ambulates without assistance.   Tobacco: former smoker, 5 pack years  Alcohol: last used 20 years ago  Drugs: former daily marijuana user, quit 15 years ago

## 2018-01-20 NOTE — H&P ADULT - NSHPPHYSICALEXAM_GEN_ALL_CORE
Vital Signs Last 24 Hrs  T(C): 37 (01-20-18 @ 09:48), Max: 37 (01-20-18 @ 09:48)  T(F): 98.6 (01-20-18 @ 09:48), Max: 98.6 (01-20-18 @ 09:48)  HR: 118 (01-20-18 @ 12:41) (116 - 123)  BP: 101/58 (01-20-18 @ 12:41) (101/58 - 106/58)  RR: 18 (01-20-18 @ 12:41) (18 - 25)  SpO2: 100% (01-20-18 @ 12:41) (98% - 100%) on BiPAP    Physical Exam:  General: Well developed, well nourished, NAD, elderly AA male lying comfortably in bed  HEENT: NCAT, +BiPap mask on  Neurology: A&Ox3, nonfocal, sensation intact  Respiratory: +diffuse wheezing, no rales, no rhonchi  CV: RRR, +S1/S2, no murmurs, rubs or gallops  Abdominal: Soft, NT, ND +BSx4, no guarding, no rebound  Extremities: No C/C/E, + peripheral pulses  MSK: Normal ROM, no joint erythema or warmth, no joint swelling   Skin: +dry skin b/l legs Vital Signs Last 24 Hrs  T(C): 37 (01-20-18 @ 09:48), Max: 37 (01-20-18 @ 09:48)  T(F): 98.6 (01-20-18 @ 09:48), Max: 98.6 (01-20-18 @ 09:48)  HR: 118 (01-20-18 @ 12:41) (116 - 123)  BP: 101/58 (01-20-18 @ 12:41) (101/58 - 106/58)  RR: 18 (01-20-18 @ 12:41) (18 - 25)  SpO2: 100% (01-20-18 @ 12:41) (98% - 100%) on BiPAP    Physical Exam:  General: elderly man lying comfortably in bed with BiPAP on  Head: normocephalic, atraumatic  Eyes: anicteric  ENT: moist mucous membranes, +BiPAP mask on  Neurology: answering questions and following commands appropriately; nonfocal, sensation intact  Respiratory: +diffuse wheezing, no rales, no rhonchi, decreased breath sounds overall  CV: tachycardic at 104bpm, +S1/S2  Abdominal: Soft, NT, ND +BSx4, no guarding, no rebound  Extremities: No cyanosis or edema, + peripheral pulses  MSK: no joint erythema or warmth, no joint swelling   Skin: +dry skin b/l legs  Psych: normal affect

## 2018-01-21 DIAGNOSIS — E11.9 TYPE 2 DIABETES MELLITUS WITHOUT COMPLICATIONS: ICD-10-CM

## 2018-01-21 DIAGNOSIS — E83.39 OTHER DISORDERS OF PHOSPHORUS METABOLISM: ICD-10-CM

## 2018-01-21 LAB
ALBUMIN SERPL ELPH-MCNC: 3 G/DL — LOW (ref 3.3–5)
ALP SERPL-CCNC: 64 U/L — SIGNIFICANT CHANGE UP (ref 40–120)
ALT FLD-CCNC: 22 U/L — SIGNIFICANT CHANGE UP (ref 12–78)
ANION GAP SERPL CALC-SCNC: 3 MMOL/L — LOW (ref 5–17)
AST SERPL-CCNC: 16 U/L — SIGNIFICANT CHANGE UP (ref 15–37)
BASE EXCESS BLDA CALC-SCNC: 10.9 MMOL/L — HIGH (ref -2–2)
BILIRUB SERPL-MCNC: 0.5 MG/DL — SIGNIFICANT CHANGE UP (ref 0.2–1.2)
BLOOD GAS COMMENTS ARTERIAL: SIGNIFICANT CHANGE UP
BLOOD GAS COMMENTS ARTERIAL: SIGNIFICANT CHANGE UP
BUN SERPL-MCNC: 18 MG/DL — SIGNIFICANT CHANGE UP (ref 7–23)
CALCIUM SERPL-MCNC: 9.6 MG/DL — SIGNIFICANT CHANGE UP (ref 8.5–10.1)
CHLORIDE SERPL-SCNC: 97 MMOL/L — SIGNIFICANT CHANGE UP (ref 96–108)
CO2 SERPL-SCNC: 38 MMOL/L — HIGH (ref 22–31)
CREAT SERPL-MCNC: 1.1 MG/DL — SIGNIFICANT CHANGE UP (ref 0.5–1.3)
GLUCOSE SERPL-MCNC: 190 MG/DL — HIGH (ref 70–99)
HCO3 BLDA-SCNC: 33 MMOL/L — HIGH (ref 23–27)
HCT VFR BLD CALC: 36.3 % — LOW (ref 39–50)
HGB BLD-MCNC: 11.5 G/DL — LOW (ref 13–17)
HIV 1 & 2 AB SERPL IA.RAPID: SIGNIFICANT CHANGE UP
HOROWITZ INDEX BLDA+IHG-RTO: 30 — SIGNIFICANT CHANGE UP
INR BLD: 1.04 RATIO — SIGNIFICANT CHANGE UP (ref 0.88–1.16)
MAGNESIUM SERPL-MCNC: 1.9 MG/DL — SIGNIFICANT CHANGE UP (ref 1.6–2.6)
MCHC RBC-ENTMCNC: 28.2 PG — SIGNIFICANT CHANGE UP (ref 27–34)
MCHC RBC-ENTMCNC: 31.7 GM/DL — LOW (ref 32–36)
MCV RBC AUTO: 89.1 FL — SIGNIFICANT CHANGE UP (ref 80–100)
PCO2 BLDA: 70 MMHG — CRITICAL HIGH (ref 32–46)
PH BLDA: 7.34 — LOW (ref 7.35–7.45)
PHOSPHATE SERPL-MCNC: 2.3 MG/DL — LOW (ref 2.5–4.5)
PLATELET # BLD AUTO: 163 K/UL — SIGNIFICANT CHANGE UP (ref 150–400)
PO2 BLDA: 84 MMHG — SIGNIFICANT CHANGE UP (ref 74–108)
POTASSIUM SERPL-MCNC: 5 MMOL/L — SIGNIFICANT CHANGE UP (ref 3.5–5.3)
POTASSIUM SERPL-SCNC: 5 MMOL/L — SIGNIFICANT CHANGE UP (ref 3.5–5.3)
PROT SERPL-MCNC: 6 G/DL — SIGNIFICANT CHANGE UP (ref 6–8.3)
PROTHROM AB SERPL-ACNC: 11.3 SEC — SIGNIFICANT CHANGE UP (ref 9.8–12.7)
RBC # BLD: 4.08 M/UL — LOW (ref 4.2–5.8)
RBC # FLD: 13.2 % — SIGNIFICANT CHANGE UP (ref 10.3–14.5)
SAO2 % BLDA: 97 % — HIGH (ref 92–96)
SODIUM SERPL-SCNC: 138 MMOL/L — SIGNIFICANT CHANGE UP (ref 135–145)
WBC # BLD: 7.1 K/UL — SIGNIFICANT CHANGE UP (ref 3.8–10.5)
WBC # FLD AUTO: 7.1 K/UL — SIGNIFICANT CHANGE UP (ref 3.8–10.5)

## 2018-01-21 PROCEDURE — 99233 SBSQ HOSP IP/OBS HIGH 50: CPT

## 2018-01-21 RX ORDER — INSULIN LISPRO 100/ML
VIAL (ML) SUBCUTANEOUS
Qty: 0 | Refills: 0 | Status: DISCONTINUED | OUTPATIENT
Start: 2018-01-21 | End: 2018-01-22

## 2018-01-21 RX ORDER — SODIUM,POTASSIUM PHOSPHATES 278-250MG
1 POWDER IN PACKET (EA) ORAL
Qty: 0 | Refills: 0 | Status: COMPLETED | OUTPATIENT
Start: 2018-01-21 | End: 2018-01-23

## 2018-01-21 RX ORDER — PANTOPRAZOLE SODIUM 20 MG/1
40 TABLET, DELAYED RELEASE ORAL
Qty: 0 | Refills: 0 | Status: DISCONTINUED | OUTPATIENT
Start: 2018-01-21 | End: 2018-01-23

## 2018-01-21 RX ADMIN — Medication 3 MILLILITER(S): at 07:34

## 2018-01-21 RX ADMIN — Medication 40 MILLIGRAM(S): at 10:00

## 2018-01-21 RX ADMIN — Medication 3 MILLILITER(S): at 02:23

## 2018-01-21 RX ADMIN — Medication 1 TABLET(S): at 14:20

## 2018-01-21 RX ADMIN — Medication 0.5 MILLIGRAM(S): at 07:34

## 2018-01-21 RX ADMIN — Medication 3 MILLILITER(S): at 15:39

## 2018-01-21 RX ADMIN — TAMSULOSIN HYDROCHLORIDE 0.4 MILLIGRAM(S): 0.4 CAPSULE ORAL at 22:37

## 2018-01-21 RX ADMIN — ATORVASTATIN CALCIUM 40 MILLIGRAM(S): 80 TABLET, FILM COATED ORAL at 22:37

## 2018-01-21 RX ADMIN — CEFTRIAXONE 100 GRAM(S): 500 INJECTION, POWDER, FOR SOLUTION INTRAMUSCULAR; INTRAVENOUS at 10:00

## 2018-01-21 RX ADMIN — AZITHROMYCIN 255 MILLIGRAM(S): 500 TABLET, FILM COATED ORAL at 10:00

## 2018-01-21 RX ADMIN — ENOXAPARIN SODIUM 40 MILLIGRAM(S): 100 INJECTION SUBCUTANEOUS at 12:35

## 2018-01-21 RX ADMIN — Medication 3 MILLILITER(S): at 20:22

## 2018-01-21 RX ADMIN — Medication 3 MILLILITER(S): at 23:20

## 2018-01-21 RX ADMIN — Medication 2: at 05:17

## 2018-01-21 RX ADMIN — Medication 3: at 17:11

## 2018-01-21 RX ADMIN — Medication 3: at 22:37

## 2018-01-21 RX ADMIN — Medication 40 MILLIGRAM(S): at 02:12

## 2018-01-21 RX ADMIN — Medication 1 PACKET(S): at 17:11

## 2018-01-21 RX ADMIN — Medication 0.5 MILLIGRAM(S): at 20:22

## 2018-01-21 RX ADMIN — Medication 40 MILLIGRAM(S): at 17:11

## 2018-01-21 RX ADMIN — Medication 1: at 12:34

## 2018-01-21 RX ADMIN — SODIUM CHLORIDE 60 MILLILITER(S): 9 INJECTION, SOLUTION INTRAVENOUS at 14:21

## 2018-01-21 RX ADMIN — Medication 81 MILLIGRAM(S): at 12:35

## 2018-01-21 RX ADMIN — CLOPIDOGREL BISULFATE 75 MILLIGRAM(S): 75 TABLET, FILM COATED ORAL at 12:35

## 2018-01-21 NOTE — PROGRESS NOTE ADULT - SUBJECTIVE AND OBJECTIVE BOX
Patient is a 77y old  Male who presents with a chief complaint of SOB (20 Jan 2018 20:57)      HPI:  76yo M w/ PMH of CAD s/p CABG, COPD (on 2L home O2), DMII, Dyslipidemia, HTN, PVD presenting with SOB x 2 days. Patient states symptoms started yesterday with mild SOB, but this morning he had much more difficulty breathing and wheezing despite nebulizer treatment. He has a nonproductive cough, which he does not believe has increased much from baseline. Patient admits to noncompliance with COPD maintenance medications as he thought they are not effective and has only been using the albuterol nebulizer. Pt denies fevers, chills, n/v, chest pain, palpitations, abdominal pain or muscle weakness. Denies recent sick contacts or travel. Patient was admitted to [facility] 1 month ago for similar complaints, treated for COPD exacerbation and discharged home with prednisone taper and home oxygen. Patient is a former smoker and quit "many years ago." He was a daily marijuana user and quit 15 years ago.     In the ED, patient's vitals were: T98.6F, /58, , RR 18, SpO2 100% on BiPAP. Significant labs include: Glucose 225. ABG 7.22/86/98/28/97% on 5L NC. RVP negative. Patient given Solu-medrol 125mg x1, Azithromycin 500mg x1, Rocephin 1g x1, Duonebs x2.   CXR: No sign of lobar consolidation vascular congestion or effusion.  EKG: sinus tachycardia at 125bpm, unchanged from prior on 12/18/17. (20 Jan 2018 13:21)      INTERVAL HPI/OVERNIGHT EVENTS: Pt seen and evaluated at the bedside. No acute overnight events occured.     Telemetry: reviewed strips, sinus rhythm in 80's no events      T(C): 36 (01-21-18 @ 13:54), Max: 36.8 (01-20-18 @ 16:57)  HR: 96 (01-21-18 @ 13:54) (90 - 108)  BP: 107/69 (01-21-18 @ 13:54) (104/69 - 135/85)  RR: 17 (01-21-18 @ 13:54) (17 - 18)  SpO2: 98% (01-21-18 @ 13:54) (96% - 99%)  Wt(kg): --  I&O's Summary    20 Jan 2018 07:01  -  21 Jan 2018 07:00  --------------------------------------------------------  IN: 600 mL / OUT: 800 mL / NET: -200 mL    21 Jan 2018 07:01  -  21 Jan 2018 16:18  --------------------------------------------------------  IN: 0 mL / OUT: 600 mL / NET: -600 mL      ROS:  CONSTITUTIONAL: denies fever, chills, fatigue, weakness  HEENT: denies blurred vision, sore throat  SKIN: denies new lesions, rash  CARDIOVASCULAR: denies chest pain, chest pressure, palpitations  RESPIRATORY: admits severe SOB and dyspnea on exertion  GASTROINTESTINAL: denies nausea, vomiting, diarrhea, abdominal pain  GENITOURINARY: denies dysuria, discharge  NEUROLOGICAL: denies numbness, headache, focal weakness  MUSCULOSKELETAL: denies new joint pain, muscle aches  HEMATOLOGIC: denies gross bleeding, bruising  LYMPHATICS: denies enlarged lymph nodes, extremity swelling  ENDOCRINOLOGIC: denies sweating, cold or heat intolerance    PE:  GENERAL: patient appears comfortable, no acute distress, appropriate, conversational, appears frustrated w/ medical conditions, speaking in full sentences w/ nasal cannula on 2L  EYES: sclera clear, no exudates  ENMT: oropharynx clear without erythema, no exudates, moist mucous membranes  NECK: supple, soft, no thyromegaly noted  LUNGS: diffuse inspiratory/expiratory rhonchi w/ prolonged expiratory wheezing  HEART: soft S1/S2, regular rate and rhythm  GASTROINTESTINAL: abdomen is soft, nontender, nondistended, normoactive bowel sounds, no palpable masses  INTEGUMENT: good skin turgor, no lesions noted  MUSCULOSKELETAL: no clubbing or cyanosis, no obvious deformity  NEUROLOGIC: awake, alert, oriented x3, good muscle tone in 4 extremities, no obvious sensory deficits  PSYCHIATRIC: mood is good, affect is congruent, linear and logical thought process  HEME/LYMPH: no palpable supraclavicular nodules, no obvious ecchymosis or petechiae     MEDICATIONS  (STANDING):  ALBUTerol/ipratropium for Nebulization 3 milliLiter(s) Nebulizer every 4 hours  aspirin enteric coated 81 milliGRAM(s) Oral daily  atorvastatin 40 milliGRAM(s) Oral at bedtime  azithromycin  IVPB 500 milliGRAM(s) IV Intermittent every 24 hours  buDESOnide   0.5 milliGRAM(s) Respule 0.5 milliGRAM(s) Inhalation two times a day  cefTRIAXone   IVPB 1 Gram(s) IV Intermittent every 24 hours  clopidogrel Tablet 75 milliGRAM(s) Oral daily  dextrose 5%. 1000 milliLiter(s) (50 mL/Hr) IV Continuous <Continuous>  dextrose 50% Injectable 12.5 Gram(s) IV Push once  dextrose 50% Injectable 25 Gram(s) IV Push once  dextrose 50% Injectable 25 Gram(s) IV Push once  enoxaparin Injectable 40 milliGRAM(s) SubCutaneous daily  insulin lispro (HumaLOG) corrective regimen sliding scale   SubCutaneous every 6 hours  methylPREDNISolone sodium succinate Injectable 40 milliGRAM(s) IV Push every 8 hours  multivitamin/minerals 1 Tablet(s) Oral daily  sodium chloride 0.45%. 1000 milliLiter(s) (60 mL/Hr) IV Continuous <Continuous>  tamsulosin 0.4 milliGRAM(s) Oral at bedtime  valsartan 160 milliGRAM(s) Oral daily    MEDICATIONS  (PRN):  dextrose Gel 1 Dose(s) Oral once PRN Blood Glucose LESS THAN 70 milliGRAM(s)/deciliter  glucagon  Injectable 1 milliGRAM(s) IntraMuscular once PRN Glucose LESS THAN 70 milligrams/deciliter      LABS:                        11.5   7.1   )-----------( 163      ( 21 Jan 2018 07:25 )             36.3     01-21    138  |  97  |  18  ----------------------------<  190<H>  5.0   |  38<H>  |  1.10    Ca    9.6      21 Jan 2018 07:25  Phos  2.3     01-21  Mg     1.9     01-21    TPro  6.0  /  Alb  3.0<L>  /  TBili  0.5  /  DBili  x   /  AST  16  /  ALT  22  /  AlkPhos  64  01-21    PT/INR - ( 21 Jan 2018 07:25 )   PT: 11.3 sec;   INR: 1.04 ratio         PTT - ( 20 Jan 2018 10:12 )  PTT:33.7 sec    CAPILLARY BLOOD GLUCOSE      POCT Blood Glucose.: 184 mg/dL (21 Jan 2018 11:48)  POCT Blood Glucose.: 196 mg/dL (21 Jan 2018 07:20)  POCT Blood Glucose.: 218 mg/dL (21 Jan 2018 05:07)  POCT Blood Glucose.: 217 mg/dL (20 Jan 2018 22:59)    ABG - ( 21 Jan 2018 05:04 )  pH: 7.34  /  pCO2: 70    /  pO2: 84    / HCO3: 33    / Base Excess: 10.9  /  SaO2: 97                01-20 @ 15:34   No growth to date.  --  --        I personally reviewed the CXR: no focal infiltrates

## 2018-01-21 NOTE — PROGRESS NOTE ADULT - PROBLEM SELECTOR PLAN 1
COPD exacerbation due to medication nonadherence as pt stated he has not used anything, but rescue nebs (he told me he doesn't feel other maintenance meds have helped him at all)  ICU was consulted in the ER and pt not deemed ICU candidate, will continue to monitor on tele as pt has improved clinically  ABG represents persistent CO2 retention, only mild improvement, will likely require additional bipap support tonight  Pulmonology, Dr. Dejesus following: duonebs q4h, solu-medrol 40mg q8h, and empiric CAP Abx regimen with rocephin/azithromycin for now until pt stabilizes.   RVP negative  Continuous pulse ox  advanced diet as pt can tolerate diet w/ removal of bipap for meals

## 2018-01-21 NOTE — DIETITIAN INITIAL EVALUATION ADULT. - PROBLEM SELECTOR PLAN 5
Continue valsartan.  Will hold HCTZ portion of home combination pill for now especially as pt is NPO. Monitor BP.

## 2018-01-21 NOTE — PROGRESS NOTE ADULT - SUBJECTIVE AND OBJECTIVE BOX
VITALS/LABS  1/21/2018 afeb 96 108/69 17 100%   1/21/2018 W 7.1 Hb 11.5 Plt 163 INR 1 Na 138 K 5 CO2 38 Cr 1.1   REVIEW OF SYMPTOMS    Able to give ROS  Yes     RELIABLE No   CONSTITUTIONAL Weakness Yes  Chills No Vision changes No  ENDOCRINE No unexplained hair loss No heat or cold intolerance    ALLERGY No hives  Sore throat No   RESP Coughing blood  Shortness of breath YES   NEURO No Headache  Confusion Pain neck No   CARDIAC No Chest pain No Palpitations   GI No Pain abdomen NO   Vomiting NO   PHYSICAL EXAM    HEENT Unremarkable PERRLA atraumatic   RESP Fair air entry EXP prolonged    Harsh breath sound Resp distres mild   CARDIAC S1 S2 No S3     NO JVD    ABDOMEN SOFT BS PRESENT NOT DISTENDED No hepatosplenomegaly PEDAL EDEMA present No calf tenderness  NO rash   GENERAL Not TOXIC looking  [facility]  [facility] AD[facility]   12/18-12/21/2017 HOSPITAL COURSE  [facility]                                                                                           Patient is a 76 yo male former smoker quit 30 y Not on home O2 Had home nebs utd with flu and pneumonia vaccinations with pmhx of COPD, PVD, CAD, CABG DMII, HTN, HLD Pilonidal cyst admitted [facility] 12/18-12/21/2017  with COPD exacerbation. RVP as well as Pct were neg Pt was managed with azithro BD ICS and IVCS  DM was managed with iss 12/18 V duplex n On 12/21 RA pulse ox dropped to 66% on ambulating in RA and home O2 was ordered  HPI 1/20/2018 ADMISSION [facility]   77 m brought in by EMS on CPAP wheezing with hop radually progressive SOB x 1 week No fever cough chest pain abd pain   HOSPITAL COURSE PROBLEM ASSESSMENT PLAN 1/20/2018 ADMISSION [facility]                                       RESP INFECTION  1/20 RVP n   1/20-1/21/2018 W 10.5-7.1   1/21 HIV n 1/20 pct n  1/20/2018 CXR COPD Sternal sutures Flattened l diaphragm   ACUTE HYPERCAPNIC RESP FAILURE   1/20/2018 10a 5l 722/86/98      1/20 Check ABG on BPAP    1/20/2018 1p 12/5/.4 722/87/112 --> 16/5/.3  1/21/2018 bpap 16/5/.3 734/70/84  azithro (1/20) rocephin (1/20)   COPD EX   1/20 BD Steroids  abio                 MERRILL   1/20-1/21 Cr 1.3-1.1  CAD   ASA 81 (1/20) plavix 75 (1/20)    GLOBAL ISSUE/BEST PRACTICE:        PROBLEM: Analgesia:     na                        PROBLEM: Sedation:     na               PROBLEM: HOB elevation:   y             PROBLEM: Stress ulcer proph:    na                      PROBLEM: VTE prophylaxis:      hsc (1/20) --> lvnx 40 (1/20)                   PROBLEM: Glycemic control:    iss (1/20)    PROBLEM: Nutrition:    npo (1/20) --> Cons carb crump (1/21)           PROBLEM: Advanced directive: na     PROBLEM: Allergies:  na      TIME SPENT Over 25 minutes aggregate care time spent on encounter; activities included   direct patient care, counseling and/or coordinating care reviewing notes, lab data/ imaging , discussion with multidisciplinary team/ patient  /family. Risks, benefits, alternatives  discussed in detail. Proper antibiotic use issues addressed Questions/concerns  were addressed  as  best as possible

## 2018-01-21 NOTE — DIETITIAN INITIAL EVALUATION ADULT. - OTHER INFO
Pt sleeping soundly in bed. Pt NPO while on Bipap. Referral for Stage II pressure ulcer. Pt without pressure ulcers; chart reviewed and spoke with RN.

## 2018-01-21 NOTE — PROGRESS NOTE ADULT - PROBLEM SELECTOR PROBLEM 2
Diabetes Mellitus, Type II Type 2 diabetes mellitus without complication, with long-term current use of insulin

## 2018-01-21 NOTE — CHART NOTE - NSCHARTNOTEFT_GEN_A_CORE
Event Note    Called by RN as she had a needle stick occurrence with a patient after she administered insulin through the needle.  RN feels fine, will go to ER to get appropriate blood work.  Received consent from patient to be tested for HIV and Hepatitis.

## 2018-01-21 NOTE — PROGRESS NOTE ADULT - PROBLEM SELECTOR PLAN 2
Hold oral antihyperglycemic agents  Start HISS (q6h for now while NPO)  accuchecks  a1c: 6.4 in 12/2017

## 2018-01-21 NOTE — DIETITIAN INITIAL EVALUATION ADULT. - PROBLEM SELECTOR PLAN 1
Likely due to COPD exacerbation due to medication noncompliance as pt stated he has not used anything, but rescue nebs.  ICU was consulted in the ER and pt not deemed ICU candidate at this time -- admit to telemetry.  On BiPAP with improvement in symptoms, but ABG unchanged in several hours.   Pulmonology, Dr. Dejesus, who knows pt from past admissions, recommends duonebs q4h, solu-medrol 40mg q8h, and empiric CAP Abx regimen with rocephin/azithromycin for now until pt stabilizes.   Follow up repeat ABG with change in BiPAP setting by pulm to 16/5  RVP negative  Continuous pulse ox  NPO for now while BiPAP dependent

## 2018-01-22 ENCOUNTER — TRANSCRIPTION ENCOUNTER (OUTPATIENT)
Age: 78
End: 2018-01-22

## 2018-01-22 LAB
ANION GAP SERPL CALC-SCNC: 6 MMOL/L — SIGNIFICANT CHANGE UP (ref 5–17)
BUN SERPL-MCNC: 24 MG/DL — HIGH (ref 7–23)
CALCIUM SERPL-MCNC: 9.2 MG/DL — SIGNIFICANT CHANGE UP (ref 8.5–10.1)
CHLORIDE SERPL-SCNC: 96 MMOL/L — SIGNIFICANT CHANGE UP (ref 96–108)
CO2 SERPL-SCNC: 37 MMOL/L — HIGH (ref 22–31)
CREAT SERPL-MCNC: 1.1 MG/DL — SIGNIFICANT CHANGE UP (ref 0.5–1.3)
GLUCOSE SERPL-MCNC: 236 MG/DL — HIGH (ref 70–99)
HCT VFR BLD CALC: 37.2 % — LOW (ref 39–50)
HGB BLD-MCNC: 11.8 G/DL — LOW (ref 13–17)
LEGIONELLA AG UR QL: NEGATIVE — SIGNIFICANT CHANGE UP
MAGNESIUM SERPL-MCNC: 1.9 MG/DL — SIGNIFICANT CHANGE UP (ref 1.6–2.6)
MCHC RBC-ENTMCNC: 28 PG — SIGNIFICANT CHANGE UP (ref 27–34)
MCHC RBC-ENTMCNC: 31.7 GM/DL — LOW (ref 32–36)
MCV RBC AUTO: 88.3 FL — SIGNIFICANT CHANGE UP (ref 80–100)
PHOSPHATE SERPL-MCNC: 2.6 MG/DL — SIGNIFICANT CHANGE UP (ref 2.5–4.5)
PLATELET # BLD AUTO: 180 K/UL — SIGNIFICANT CHANGE UP (ref 150–400)
POTASSIUM SERPL-MCNC: 4.8 MMOL/L — SIGNIFICANT CHANGE UP (ref 3.5–5.3)
POTASSIUM SERPL-SCNC: 4.8 MMOL/L — SIGNIFICANT CHANGE UP (ref 3.5–5.3)
RBC # BLD: 4.21 M/UL — SIGNIFICANT CHANGE UP (ref 4.2–5.8)
RBC # FLD: 13.1 % — SIGNIFICANT CHANGE UP (ref 10.3–14.5)
SODIUM SERPL-SCNC: 139 MMOL/L — SIGNIFICANT CHANGE UP (ref 135–145)
WBC # BLD: 9.5 K/UL — SIGNIFICANT CHANGE UP (ref 3.8–10.5)
WBC # FLD AUTO: 9.5 K/UL — SIGNIFICANT CHANGE UP (ref 3.8–10.5)

## 2018-01-22 PROCEDURE — 71250 CT THORAX DX C-: CPT | Mod: 26

## 2018-01-22 PROCEDURE — 99233 SBSQ HOSP IP/OBS HIGH 50: CPT | Mod: GC

## 2018-01-22 RX ORDER — CEFUROXIME AXETIL 250 MG
500 TABLET ORAL EVERY 12 HOURS
Qty: 0 | Refills: 0 | Status: DISCONTINUED | OUTPATIENT
Start: 2018-01-23 | End: 2018-01-23

## 2018-01-22 RX ORDER — LANOLIN ALCOHOL/MO/W.PET/CERES
5 CREAM (GRAM) TOPICAL AT BEDTIME
Qty: 0 | Refills: 0 | Status: DISCONTINUED | OUTPATIENT
Start: 2018-01-22 | End: 2018-01-23

## 2018-01-22 RX ORDER — INSULIN LISPRO 100/ML
VIAL (ML) SUBCUTANEOUS
Qty: 0 | Refills: 0 | Status: DISCONTINUED | OUTPATIENT
Start: 2018-01-22 | End: 2018-01-22

## 2018-01-22 RX ORDER — AZITHROMYCIN 500 MG/1
250 TABLET, FILM COATED ORAL DAILY
Qty: 0 | Refills: 0 | Status: DISCONTINUED | OUTPATIENT
Start: 2018-01-23 | End: 2018-01-23

## 2018-01-22 RX ORDER — INSULIN LISPRO 100/ML
VIAL (ML) SUBCUTANEOUS
Qty: 0 | Refills: 0 | Status: DISCONTINUED | OUTPATIENT
Start: 2018-01-22 | End: 2018-01-23

## 2018-01-22 RX ADMIN — ATORVASTATIN CALCIUM 40 MILLIGRAM(S): 80 TABLET, FILM COATED ORAL at 21:55

## 2018-01-22 RX ADMIN — TAMSULOSIN HYDROCHLORIDE 0.4 MILLIGRAM(S): 0.4 CAPSULE ORAL at 21:55

## 2018-01-22 RX ADMIN — Medication 3 MILLILITER(S): at 07:48

## 2018-01-22 RX ADMIN — Medication 0.5 MILLIGRAM(S): at 20:13

## 2018-01-22 RX ADMIN — CEFTRIAXONE 100 GRAM(S): 500 INJECTION, POWDER, FOR SOLUTION INTRAMUSCULAR; INTRAVENOUS at 10:07

## 2018-01-22 RX ADMIN — Medication 1 TABLET(S): at 13:33

## 2018-01-22 RX ADMIN — Medication 8: at 21:55

## 2018-01-22 RX ADMIN — PANTOPRAZOLE SODIUM 40 MILLIGRAM(S): 20 TABLET, DELAYED RELEASE ORAL at 05:34

## 2018-01-22 RX ADMIN — Medication 1 PACKET(S): at 05:34

## 2018-01-22 RX ADMIN — Medication 3 MILLILITER(S): at 12:15

## 2018-01-22 RX ADMIN — Medication 0.5 MILLIGRAM(S): at 07:48

## 2018-01-22 RX ADMIN — Medication 2: at 17:29

## 2018-01-22 RX ADMIN — Medication 1 PACKET(S): at 17:30

## 2018-01-22 RX ADMIN — Medication 3: at 08:15

## 2018-01-22 RX ADMIN — Medication 40 MILLIGRAM(S): at 17:30

## 2018-01-22 RX ADMIN — AZITHROMYCIN 255 MILLIGRAM(S): 500 TABLET, FILM COATED ORAL at 10:07

## 2018-01-22 RX ADMIN — Medication 2: at 12:21

## 2018-01-22 RX ADMIN — Medication 3 MILLILITER(S): at 23:35

## 2018-01-22 RX ADMIN — ENOXAPARIN SODIUM 40 MILLIGRAM(S): 100 INJECTION SUBCUTANEOUS at 13:32

## 2018-01-22 RX ADMIN — Medication 3 MILLILITER(S): at 20:13

## 2018-01-22 RX ADMIN — Medication 3 MILLILITER(S): at 02:32

## 2018-01-22 RX ADMIN — CLOPIDOGREL BISULFATE 75 MILLIGRAM(S): 75 TABLET, FILM COATED ORAL at 13:32

## 2018-01-22 RX ADMIN — Medication 40 MILLIGRAM(S): at 01:03

## 2018-01-22 RX ADMIN — SODIUM CHLORIDE 60 MILLILITER(S): 9 INJECTION, SOLUTION INTRAVENOUS at 05:34

## 2018-01-22 RX ADMIN — Medication 81 MILLIGRAM(S): at 13:33

## 2018-01-22 RX ADMIN — Medication 3 MILLILITER(S): at 16:00

## 2018-01-22 RX ADMIN — Medication 5 MILLIGRAM(S): at 22:46

## 2018-01-22 NOTE — DISCHARGE NOTE ADULT - PLAN OF CARE
stabilize - please continue home medications  - follow up with PMD within 3-5 days - continue metformin and glipizide  - encourage carb controlled diet - continue atorvastatin  - - continue valsartan/hctz improve symptoms/functional status - please continue home medications  - please take medications as prescribed  - follow up with PMD within 3-5 days - continue metformin and glipizide  - encourage carb controlled diet  - please follow up with your PCP to follow up regularly to check your fingersticks and diabetes management - continue atorvastatin

## 2018-01-22 NOTE — PROGRESS NOTE ADULT - PROBLEM SELECTOR PLAN 1
- Continue BIPAP support at night, patient comfortable on NC during the day   - Pulmonology consutled (Dr. Dejesus)  - continue duonebs q4h, solu-medrol 40mg q12h, and empiric CAP Abx regimen with rocephin/azithromycin for now  - taper steroids dosing per pulm rec   - RVP negative  - f/u CT chest to rule out pneumonia, if negative, possible d/c antibiotics and switch to PO steroids per pulmonary rec  - Continuous pulse ox  - advanced diet as pt can tolerate diet w/ removal of bipap for meals  - D/C fluids, patient able to tolerate PO diet  - f/u AM labs - Continue BIPAP support at night, patient comfortable on NC during the day   - Pulmonology consutled (Dr. Dejesus)  - continue duonebs q4h, solu-medrol 40mg q12h, and empiric CAP Abx regimen with rocephin/azithromycin for now  - will switch to PO abx with ceftin and zithromax starting 1/23  - taper steroids dosing per pulm rec   - RVP negative  - f/u CT chest to rule out pneumonia, if negative, possible d/c antibiotics and switch to PO steroids per pulmonary rec  - Continuous pulse ox  - advanced diet as pt can tolerate diet w/ removal of bipap for meals  - D/C fluids, patient able to tolerate PO diet  - f/u AM labs

## 2018-01-22 NOTE — DISCHARGE NOTE ADULT - MEDICATION SUMMARY - MEDICATIONS TO TAKE
I will START or STAY ON the medications listed below when I get home from the hospital:    predniSONE 10 mg oral tablet  -- 4 tabs q daily x3 days, then 3 tabs q daily x3 days, then 2 tabs q daily x3 days, then 1 tab q daily x3 days  -- It is very important that you take or use this exactly as directed.  Do not skip doses or discontinue unless directed by your doctor.  Obtain medical advice before taking any non-prescription drugs as some may affect the action of this medication.  Take with food or milk.    -- Indication: For COPD exacerbation    aspirin 81 mg oral tablet  -- 1 tab(s) by mouth once a day  -- Indication: For CAD (Coronary Artery Disease)    tamsulosin 0.4 mg oral capsule  -- 1 cap(s) by mouth once a day (at bedtime)  -- Indication: For BPH    metFORMIN  -- 1000 milligram(s) by mouth 2 times a day  -- Indication: For Diabetes Mellitus, Type II    glipiZIDE 2.5 mg oral tablet, extended release  -- 1 tab(s) by mouth 2 times a day (with meals)  -- Indication: For Diabetes Mellitus, Type II    atorvastatin 40 mg oral tablet  -- 1 tab(s) by mouth once a day (at bedtime)  -- Indication: For Dyslipidemia    Diovan  mg-12.5 mg oral tablet  -- 1 tab(s) by mouth once a day  -- Indication: For Hypertension    clopidogrel 75 mg oral tablet  -- 1 tab(s) by mouth once a day  -- Indication: For CAD (Coronary Artery Disease)    Incruse Ellipta 62.5 mcg/inh inhalation powder  -- 1 puff(s) inhaled once a day  -- Indication: For COPD exacerbation    Breo Ellipta 200 mcg-25 mcg/inh inhalation powder  -- 1 puff(s) inhaled once a day  -- Indication: For COPD exacerbation    cefuroxime 500 mg oral tablet  -- 1 tab(s) by mouth every 12 hours  -- Indication: For COPD exacerbation    azithromycin 250 mg oral tablet  -- 1 tab(s) by mouth once a day  -- Indication: For COPD exacerbation    Multiple Vitamins with Minerals oral tablet  -- 1 tab(s) by mouth once a day  -- Indication: For Prophylactic measure I will START or STAY ON the medications listed below when I get home from the hospital:    predniSONE 10 mg oral tablet  -- 4 tabs q daily x3 days, then 3 tabs q daily x3 days, then 2 tabs q daily x3 days, then 1 tab q daily x3 days  -- It is very important that you take or use this exactly as directed.  Do not skip doses or discontinue unless directed by your doctor.  Obtain medical advice before taking any non-prescription drugs as some may affect the action of this medication.  Take with food or milk.    -- Indication: For COPD exacerbation    aspirin 81 mg oral tablet  -- 1 tab(s) by mouth once a day  -- Indication: For CAD (Coronary Artery Disease)    tamsulosin 0.4 mg oral capsule  -- 1 cap(s) by mouth once a day (at bedtime)  -- Indication: For BPH    metFORMIN  -- 1000 milligram(s) by mouth 2 times a day  -- Indication: For Diabetes Mellitus, Type II    glipiZIDE 2.5 mg oral tablet, extended release  -- 1 tab(s) by mouth 2 times a day (with meals)  -- Indication: For Diabetes Mellitus, Type II    atorvastatin 40 mg oral tablet  -- 1 tab(s) by mouth once a day (at bedtime)  -- Indication: For Dyslipidemia    Diovan  mg-12.5 mg oral tablet  -- 1 tab(s) by mouth once a day  -- Indication: For Hypertension    clopidogrel 75 mg oral tablet  -- 1 tab(s) by mouth once a day  -- Indication: For CAD (Coronary Artery Disease)    Symbicort 160 mcg-4.5 mcg/inh inhalation aerosol  -- 2 puff(s) inhaled 2 times a day   -- Check with your doctor before becoming pregnant.  For inhalation only.  Rinse mouth thoroughly after use.    -- Indication: For COPD exacerbation    Tudorza Pressair 400 mcg/inh inhalation powder  -- 400 microgram(s) inhaled 2 times a day   -- Check with your doctor before becoming pregnant.  For inhalation only.  Obtain medical advice before taking any non-prescription drugs as some may affect the action of this medication.    -- Indication: For COPD exacerbation    cefuroxime 500 mg oral tablet  -- 1 tab(s) by mouth every 12 hours  -- Indication: For COPD exacerbation    azithromycin 250 mg oral tablet  -- 1 tab(s) by mouth once a day  -- Indication: For COPD exacerbation    Multiple Vitamins with Minerals oral tablet  -- 1 tab(s) by mouth once a day  -- Indication: For Prophylactic measure

## 2018-01-22 NOTE — PROGRESS NOTE ADULT - SUBJECTIVE AND OBJECTIVE BOX
Patient is a 77y old  Male who presents with a chief complaint of SOB (20 Jan 2018 20:57)      HPI:  76yo M w/ PMH of CAD s/p CABG, COPD (on 2L home O2), DMII, Dyslipidemia, HTN, PVD presenting with SOB x 2 days. Patient states symptoms started yesterday with mild SOB, but this morning he had much more difficulty breathing and wheezing despite nebulizer treatment. He has a nonproductive cough, which he does not believe has increased much from baseline. Patient admits to noncompliance with COPD maintenance medications as he thought they are not effective and has only been using the albuterol nebulizer. Pt denies fevers, chills, n/v, chest pain, palpitations, abdominal pain or muscle weakness. Denies recent sick contacts or travel. Patient was admitted to [facility] 1 month ago for similar complaints, treated for COPD exacerbation and discharged home with prednisone taper and home oxygen. Patient is a former smoker and quit "many years ago." He was a daily marijuana user and quit 15 years ago.     In the ED, patient's vitals were: T98.6F, /58, , RR 18, SpO2 100% on BiPAP. Significant labs include: Glucose 225. ABG 7.22/86/98/28/97% on 5L NC. RVP negative. Patient given Solu-medrol 125mg x1, Azithromycin 500mg x1, Rocephin 1g x1, Duonebs x2.   CXR: No sign of lobar consolidation vascular congestion or effusion.  EKG: sinus tachycardia at 125bpm, unchanged from prior on 12/18/17. (20 Jan 2018 13:21)      INTERVAL HPI/OVERNIGHT EVENTS: Patient seen and evaluated at bedside, states that he is breathing better. Off bipap during the day. Patient complained of not sleeping well, on bipap at night. Patient states that he uses home O2 2L at night. Reports no BM yesterday, states that he does not want a bowel regimen at this time.     Telemetry: no events overnight       ROS:  CONSTITUTIONAL: denies fever, chills, fatigue, weakness  HEENT: denies blurred vision, sore throat  CARDIOVASCULAR: denies chest pain, chest pressure, palpitations  RESPIRATORY: improving SOB, tolerating NC well, Admits to intermittent cough   GASTROINTESTINAL: denies nausea, vomiting, diarrhea, abdominal pain  GENITOURINARY: denies dysuria, discharge  NEUROLOGICAL: denies numbness, headache, focal weakness  MUSCULOSKELETAL: denies new joint pain, muscle aches    Vital Signs Last 24 Hrs  T(C): 36.9 (22 Jan 2018 07:21), Max: 36.9 (21 Jan 2018 20:02)  T(F): 98.5 (22 Jan 2018 07:21), Max: 98.5 (21 Jan 2018 20:02)  HR: 85 (22 Jan 2018 07:50) (84 - 105)  BP: 110/66 (22 Jan 2018 07:21) (100/64 - 115/69)  BP(mean): --  RR: 20 (22 Jan 2018 07:21) (17 - 20)  SpO2: 97% (22 Jan 2018 07:50) (96% - 100%)      I&O's Summary    21 Jan 2018 07:01  -  22 Jan 2018 07:00  --------------------------------------------------------  IN: 1200 mL / OUT: 600 mL / NET: 600 mL    22 Jan 2018 07:01  -  22 Jan 2018 10:34  --------------------------------------------------------  IN: 0 mL / OUT: 300 mL / NET: -300 mL    PE:  GENERAL: patient appears comfortable, no acute distress, lying flat in bed w/ nasal cannula on 2L  EYES: sclera clear, no exudates  NECK: supple, soft, no thyromegaly noted  LUNGS: diffuse inspiratory/expiratory wheezing w/ prolonged expiratory wheezing  HEART: soft S1/S2, regular rate and rhythm  GASTROINTESTINAL: abdomen is soft, nontender, nondistended, normoactive bowel sounds, no palpable masses  MUSCULOSKELETAL: no clubbing or cyanosis, no obvious deformity  NEUROLOGIC: awake, alert, oriented x3      MEDICATIONS  (STANDING):  ALBUTerol/ipratropium for Nebulization 3 milliLiter(s) Nebulizer every 4 hours  aspirin enteric coated 81 milliGRAM(s) Oral daily  atorvastatin 40 milliGRAM(s) Oral at bedtime  azithromycin  IVPB 500 milliGRAM(s) IV Intermittent every 24 hours  buDESOnide   0.5 milliGRAM(s) Respule 0.5 milliGRAM(s) Inhalation two times a day  cefTRIAXone   IVPB 1 Gram(s) IV Intermittent every 24 hours  clopidogrel Tablet 75 milliGRAM(s) Oral daily  dextrose 5%. 1000 milliLiter(s) (50 mL/Hr) IV Continuous <Continuous>  dextrose 50% Injectable 12.5 Gram(s) IV Push once  dextrose 50% Injectable 25 Gram(s) IV Push once  dextrose 50% Injectable 25 Gram(s) IV Push once  enoxaparin Injectable 40 milliGRAM(s) SubCutaneous daily  insulin lispro (HumaLOG) corrective regimen sliding scale   SubCutaneous Before meals and at bedtime  methylPREDNISolone sodium succinate Injectable 40 milliGRAM(s) IV Push every 12 hours  multivitamin/minerals 1 Tablet(s) Oral daily  pantoprazole    Tablet 40 milliGRAM(s) Oral before breakfast  potassium phosphate / sodium phosphate powder 1 Packet(s) Oral two times a day  sodium chloride 0.45%. 1000 milliLiter(s) (60 mL/Hr) IV Continuous <Continuous>  tamsulosin 0.4 milliGRAM(s) Oral at bedtime  valsartan 160 milliGRAM(s) Oral daily    MEDICATIONS  (PRN):  dextrose Gel 1 Dose(s) Oral once PRN Blood Glucose LESS THAN 70 milliGRAM(s)/deciliter  glucagon  Injectable 1 milliGRAM(s) IntraMuscular once PRN Glucose LESS THAN 70 milligrams/deciliter        LABS:                          11.8   9.5   )-----------( 180      ( 22 Jan 2018 06:47 )             37.2     01-22    139  |  96  |  24<H>  ----------------------------<  236<H>  4.8   |  37<H>  |  1.10    Ca    9.2      22 Jan 2018 06:47  Phos  2.6     01-22  Mg     1.9     01-22    TPro  6.0  /  Alb  3.0<L>  /  TBili  0.5  /  DBili  x   /  AST  16  /  ALT  22  /  AlkPhos  64  01-21     PTT - ( 20 Jan 2018 10:12 )  PTT:33.7 sec      POCT Blood Glucose.: 252 mg/dL (22 Jan 2018 07:23)  POCT Blood Glucose.: 184 mg/dL (21 Jan 2018 11:48)  POCT Blood Glucose.: 196 mg/dL (21 Jan 2018 07:20)  POCT Blood Glucose.: 218 mg/dL (21 Jan 2018 05:07)  POCT Blood Glucose.: 217 mg/dL (20 Jan 2018 22:59)    ABG - ( 21 Jan 2018 05:04 )  pH: 7.34  /  pCO2: 70    /  pO2: 84    / HCO3: 33    / Base Excess: 10.9  /  SaO2: 97        Culture Results:   No growth to date. (01.20.18 @ 15:34)      < from: Xray Chest 1 View AP/PA (01.20.18 @ 09:58) >  EXAM:  XR CHEST AP OR PA 1V                            PROCEDURE DATE:  01/20/2018      INTERPRETATION:  Clinical information: Shortness of breath    Portable study, 9:50 AM    Comparison exam dated 12/18/2017.    Cardiac monitor leads present.Sternal sutures present. Flattening left   hemidiaphragm, unchanged. No sign of lobar consolidation vascular   congestion or effusion. Heart size within normal limits. Hilar regions,   mediastinal contours intact. No acute osseous abnormalities.    IMPRESSION:    See above report      DANNY MCGHEE M.D.,ATTENDING RADIOLOGIST  This document has been electronically signed. Jan 20 2018 10:15AM    < end of copied text > Patient is a 77y old  Male who presents with a chief complaint of SOB (20 Jan 2018 20:57)      HPI:  76yo M w/ PMH of CAD s/p CABG, COPD (on 2L home O2), DMII, Dyslipidemia, HTN, PVD presenting with SOB x 2 days. Patient states symptoms started yesterday with mild SOB, but this morning he had much more difficulty breathing and wheezing despite nebulizer treatment. He has a nonproductive cough, which he does not believe has increased much from baseline. Patient admits to noncompliance with COPD maintenance medications as he thought they are not effective and has only been using the albuterol nebulizer. Pt denies fevers, chills, n/v, chest pain, palpitations, abdominal pain or muscle weakness. Denies recent sick contacts or travel. Patient was admitted to [facility] 1 month ago for similar complaints, treated for COPD exacerbation and discharged home with prednisone taper and home oxygen. Patient is a former smoker and quit "many years ago." He was a daily marijuana user and quit 15 years ago.     In the ED, patient's vitals were: T98.6F, /58, , RR 18, SpO2 100% on BiPAP. Significant labs include: Glucose 225. ABG 7.22/86/98/28/97% on 5L NC. RVP negative. Patient given Solu-medrol 125mg x1, Azithromycin 500mg x1, Rocephin 1g x1, Duonebs x2.   CXR: No sign of lobar consolidation vascular congestion or effusion.  EKG: sinus tachycardia at 125bpm, unchanged from prior on 12/18/17. (20 Jan 2018 13:21)      INTERVAL HPI/OVERNIGHT EVENTS: Patient seen and evaluated at bedside, states that he is breathing better. Off bipap during the day. Patient complained of not sleeping well, on bipap at night. Patient states that he uses home O2 2L at night. Reports no BM yesterday, states that he does not want a bowel regimen at this time.     Telemetry: no events overnight       ROS:  CONSTITUTIONAL: denies fever, chills, fatigue, weakness  HEENT: denies blurred vision, sore throat  CARDIOVASCULAR: denies chest pain, chest pressure, palpitations  RESPIRATORY: improving SOB, tolerating NC well, Admits to intermittent cough   GASTROINTESTINAL: denies nausea, vomiting, diarrhea, abdominal pain  GENITOURINARY: denies dysuria, discharge  NEUROLOGICAL: denies numbness, headache, focal weakness  MUSCULOSKELETAL: denies new joint pain, muscle aches  All other systems reviewed and are negative    Vital Signs Last 24 Hrs  T(C): 36.9 (22 Jan 2018 07:21), Max: 36.9 (21 Jan 2018 20:02)  T(F): 98.5 (22 Jan 2018 07:21), Max: 98.5 (21 Jan 2018 20:02)  HR: 85 (22 Jan 2018 07:50) (84 - 105)  BP: 110/66 (22 Jan 2018 07:21) (100/64 - 115/69)  BP(mean): --  RR: 20 (22 Jan 2018 07:21) (17 - 20)  SpO2: 97% (22 Jan 2018 07:50) (96% - 100%)      I&O's Summary    21 Jan 2018 07:01  -  22 Jan 2018 07:00  --------------------------------------------------------  IN: 1200 mL / OUT: 600 mL / NET: 600 mL    22 Jan 2018 07:01  -  22 Jan 2018 10:34  --------------------------------------------------------  IN: 0 mL / OUT: 300 mL / NET: -300 mL    PE:  GENERAL: patient appears comfortable, no acute distress, lying flat in bed w/ nasal cannula on 2L  EYES: sclera clear, no exudates  NECK: supple, soft, no thyromegaly noted  LUNGS: diffuse inspiratory/expiratory wheezing w/ prolonged expiratory wheezing  HEART: soft S1/S2, regular rate and rhythm  GASTROINTESTINAL: abdomen is soft, nontender, nondistended, normoactive bowel sounds, no palpable masses  MUSCULOSKELETAL: no clubbing or cyanosis, no obvious deformity  NEUROLOGIC: awake, alert, oriented x3  PSYCH: pleasant, appropriate, mood is good, affect congruent      MEDICATIONS  (STANDING):  ALBUTerol/ipratropium for Nebulization 3 milliLiter(s) Nebulizer every 4 hours  aspirin enteric coated 81 milliGRAM(s) Oral daily  atorvastatin 40 milliGRAM(s) Oral at bedtime  azithromycin  IVPB 500 milliGRAM(s) IV Intermittent every 24 hours  buDESOnide   0.5 milliGRAM(s) Respule 0.5 milliGRAM(s) Inhalation two times a day  cefTRIAXone   IVPB 1 Gram(s) IV Intermittent every 24 hours  clopidogrel Tablet 75 milliGRAM(s) Oral daily  dextrose 5%. 1000 milliLiter(s) (50 mL/Hr) IV Continuous <Continuous>  dextrose 50% Injectable 12.5 Gram(s) IV Push once  dextrose 50% Injectable 25 Gram(s) IV Push once  dextrose 50% Injectable 25 Gram(s) IV Push once  enoxaparin Injectable 40 milliGRAM(s) SubCutaneous daily  insulin lispro (HumaLOG) corrective regimen sliding scale   SubCutaneous Before meals and at bedtime  methylPREDNISolone sodium succinate Injectable 40 milliGRAM(s) IV Push every 12 hours  multivitamin/minerals 1 Tablet(s) Oral daily  pantoprazole    Tablet 40 milliGRAM(s) Oral before breakfast  potassium phosphate / sodium phosphate powder 1 Packet(s) Oral two times a day  sodium chloride 0.45%. 1000 milliLiter(s) (60 mL/Hr) IV Continuous <Continuous>  tamsulosin 0.4 milliGRAM(s) Oral at bedtime  valsartan 160 milliGRAM(s) Oral daily    MEDICATIONS  (PRN):  dextrose Gel 1 Dose(s) Oral once PRN Blood Glucose LESS THAN 70 milliGRAM(s)/deciliter  glucagon  Injectable 1 milliGRAM(s) IntraMuscular once PRN Glucose LESS THAN 70 milligrams/deciliter        LABS:                          11.8   9.5   )-----------( 180      ( 22 Jan 2018 06:47 )             37.2     01-22    139  |  96  |  24<H>  ----------------------------<  236<H>  4.8   |  37<H>  |  1.10    Ca    9.2      22 Jan 2018 06:47  Phos  2.6     01-22  Mg     1.9     01-22    TPro  6.0  /  Alb  3.0<L>  /  TBili  0.5  /  DBili  x   /  AST  16  /  ALT  22  /  AlkPhos  64  01-21     PTT - ( 20 Jan 2018 10:12 )  PTT:33.7 sec      POCT Blood Glucose.: 252 mg/dL (22 Jan 2018 07:23)  POCT Blood Glucose.: 184 mg/dL (21 Jan 2018 11:48)  POCT Blood Glucose.: 196 mg/dL (21 Jan 2018 07:20)  POCT Blood Glucose.: 218 mg/dL (21 Jan 2018 05:07)  POCT Blood Glucose.: 217 mg/dL (20 Jan 2018 22:59)    ABG - ( 21 Jan 2018 05:04 )  pH: 7.34  /  pCO2: 70    /  pO2: 84    / HCO3: 33    / Base Excess: 10.9  /  SaO2: 97        Culture Results:   No growth to date. (01.20.18 @ 15:34)      < from: Xray Chest 1 View AP/PA (01.20.18 @ 09:58) >  EXAM:  XR CHEST AP OR PA 1V                            PROCEDURE DATE:  01/20/2018      INTERPRETATION:  Clinical information: Shortness of breath    Portable study, 9:50 AM    Comparison exam dated 12/18/2017.    Cardiac monitor leads present.Sternal sutures present. Flattening left   hemidiaphragm, unchanged. No sign of lobar consolidation vascular   congestion or effusion. Heart size within normal limits. Hilar regions,   mediastinal contours intact. No acute osseous abnormalities.    IMPRESSION:    See above report      DANNY MCGHEE M.D.,ATTENDING RADIOLOGIST  This document has been electronically signed. Jan 20 2018 10:15AM    < end of copied text >

## 2018-01-22 NOTE — PROGRESS NOTE ADULT - ATTENDING COMMENTS
The admission plan was discussed in detail with the patient and family members at the bedside. Their questions and concerns were addressed to the best of my ability. They are in agreement with the plan as detailed above. They demonstrated adequate understanding of the counseling which I have provided.
I personally conducted a physical examination of the patient. I personally gathered the patient's history. I edited the above listed findings which were prepared by the listed resident physician. I personally discussed the plan of care with the patient. The questions and concerns were addressed to the best of my ability. The patient is in agreement with the listed treatment plan.     A/P: I edited and agree w/ plan above. D/c planing for home tomorrow depending on clinical course

## 2018-01-22 NOTE — DISCHARGE NOTE ADULT - PATIENT PORTAL LINK FT
“You can access the [employer] Patient Portal, offered by [facility], by registering with the following website: [email or web address]”

## 2018-01-22 NOTE — PROGRESS NOTE ADULT - SUBJECTIVE AND OBJECTIVE BOX
VITALS/LABS  1/22/2018 afeb 89 110/66 20 100%   1/22/2018 2a 16/5/.3   1/22/2018 1/2 NS 60 (1/20)   1/22/2018 W 9.5 Hb 11.8 Plt 180 Na 139 K 4.8 CO2 37 Cr 1.1  REVIEW OF SYMPTOMS    Able to give ROS  Yes     RELIABLE No   CONSTITUTIONAL Weakness Yes  Chills No Vision changes No  ENDOCRINE No unexplained hair loss No heat or cold intolerance    ALLERGY No hives  Sore throat No   RESP Coughing blood  Shortness of breath YES   NEURO No Headache  Confusion Pain neck No   CARDIAC No Chest pain No Palpitations   GI No Pain abdomen NO   Vomiting NO   PHYSICAL EXAM    HEENT Unremarkable PERRLA atraumatic   RESP Fair air entry EXP prolonged    Harsh breath sound Resp distres mild   CARDIAC S1 S2 No S3     NO JVD    ABDOMEN SOFT BS PRESENT NOT DISTENDED No hepatosplenomegaly PEDAL EDEMA present No calf tenderness  NO rash   GENERAL Not TOXIC looking  PAT[facility] DES[facility]  [facility]/20/2018 ADMISSION [facility]   12/18-12/21/2017 HOSPITAL COURSE  [facility]                                                                                           Patient is a 76 yo male former smoker quit 30 y Not on home O2 Had home nebs utd with flu and pneumonia vaccinations with pmhx of COPD, PVD, CAD, CABG DMII, HTN, HLD Pilonidal cyst admitted [facility] 12/18-12/21/2017  with COPD exacerbation. RVP as well as Pct were neg Pt was managed with azithro BD ICS and IVCS  DM was managed with iss 12/18 V duplex n On 12/21 RA pulse ox dropped to 66% on ambulating in RA and home O2 was ordered  HPI 1/20/2018 ADMISSION [facility]   77 m brought in by EMS on CPAP wheezing with hop radually progressive SOB x 1 week No fever cough chest pain abd pain   HOSPITAL COURSE PROBLEM ASSESSMENT PLAN 1/20/2018 ADMISSION [facility]                                       RESP INFECTION  1/20 RVP n   1/20-1/21/2018 W 10.5-7.1   1/21 HIV n 1/20 pct n  1/20/2018 CXR COPD Sternal sutures Flattened l diaphragm   ACUTE HYPERCAPNIC RESP FAILURE   1/20/2018 10a 5l 722/86/98      1/20 Check ABG on BPAP    1/20/2018 1p 12/5/.4 722/87/112 --> 16/5/.3  1/21/2018 bpap 16/5/.3 734/70/84  azithro (1/20) rocephin (1/20)   COPD EX   1/20 BD Steroids  abio                 MERRILL   1/20-1/21 Cr 1.3-1.1  CAD   ASA 81 (1/20) plavix 75 (1/20)    GLOBAL ISSUE/BEST PRACTICE:        PROBLEM: Analgesia:     na                        PROBLEM: Sedation:     na               PROBLEM: HOB elevation:   y             PROBLEM: Stress ulcer proph:    na                      PROBLEM: VTE prophylaxis:      hsc (1/20) --> lvnx 40 (1/20)                   PROBLEM: Glycemic control:    iss (1/20)    PROBLEM: Nutrition:    npo (1/20) --> Cons carb crump (1/21)           PROBLEM: Advanced directive: na     PROBLEM: Allergies:  na      TIME SPENT Over 25 minutes aggregate care time spent on encounter; activities included   direct patient care, counseling and/or coordinating care reviewing notes, lab data/ imaging , discussion with multidisciplinary team/ patient  /family. Risks, benefits, alternatives  discussed in detail. Proper antibiotic use issues addressed Questions/concerns  were addressed  as  best as possible

## 2018-01-22 NOTE — DISCHARGE NOTE ADULT - HOSPITAL COURSE
76yo M w/ PMH of CAD s/p CABG, COPD (on 2L home O2), DMII, Dyslipidemia, HTN, PVD presented with SOB x 2 days. Patient stated symptoms started yesterday with mild SOB, but this morning he had much more difficulty breathing and wheezing despite nebulizer treatment. He had a nonproductive cough, which he did not believe has increased much from baseline. Patient admited to noncompliance with COPD maintenance medications as he thought they are not effective and has only been using the albuterol nebulizer. Pt denied fevers, chills, n/v, chest pain, palpitations, abdominal pain or muscle weakness. Denied recent sick contacts or travel. Patient was admitted to [facility] 1 month ago for similar complaints, treated for COPD exacerbation and discharged home with prednisone taper and home oxygen. Patient is a former smoker and quit "many years ago." He was a daily marijuana user and quit 15 years ago.   In the ED, patient's vitals were: T98.6F, /58, , RR 18, SpO2 100% on BiPAP. Significant labs include: Glucose 225. ABG 7.22/86/98/28/97% on 5L NC. RVP negative. Patient given Solu-medrol 125mg x1, Azithromycin 500mg x1, Rocephin 1g x1, Duonebs x2. CXR showed no sign of lobar consolidation vascular congestion or effusion. EKG sig for sinus tachycardia at 125bpm, unchanged from prior on 12/18/17. Patient was admitted to telemetry for acute on chronic hypoxic and hypercarbic respiratory failure due to COPD exacerbation. Patient was started on BiPap. Evaluated by pulmonology, Dr Dejesus, who started pt on duonebs q4h, solu-medrol 40mg q8h, and empiric CAP Abx regimen with rocephin/azithromycin. Patient was able to tolerate increased time off BiPap and was restarted on PO diet. Steroid were tapered and changed to PO and his abx were changed to PO ceftin and zithromax. Pts symptoms improved. Psych evaluated pt as family expressed concerns of psych history and an unsafe discharge home for pt. Psych deemed pt to have capacity and decision making capabilities. Patient was able to be discharged home with appropriate outpatient followup. 76yo M w/ PMH of CAD s/p CABG, COPD (on 2L home O2), DMII, Dyslipidemia, HTN, PVD presented with SOB x 2 days. Patient stated symptoms started yesterday with mild SOB, but this morning he had much more difficulty breathing and wheezing despite nebulizer treatment. He had a nonproductive cough, which he did not believe has increased much from baseline. Patient admited to noncompliance with COPD maintenance medications as he thought they are not effective and has only been using the albuterol nebulizer. Pt denied fevers, chills, n/v, chest pain, palpitations, abdominal pain or muscle weakness. Denied recent sick contacts or travel. Patient was admitted to [facility] 1 month ago for similar complaints, treated for COPD exacerbation and discharged home with prednisone taper and home oxygen. Patient is a former smoker and quit "many years ago." He was a daily marijuana user and quit 15 years ago.   In the ED, patient's vitals were: T98.6F, /58, , RR 18, SpO2 100% on BiPAP. Significant labs include: Glucose 225. ABG 7.22/86/98/28/97% on 5L NC. RVP negative. Patient given Solu-medrol 125mg x1, Azithromycin 500mg x1, Rocephin 1g x1, Duonebs x2. CXR showed no sign of lobar consolidation vascular congestion or effusion. EKG sig for sinus tachycardia at 125bpm, unchanged from prior on 12/18/17. Patient was admitted to telemetry for acute on chronic hypoxic and hypercarbic respiratory failure due to COPD exacerbation. Patient was started on BiPap. Evaluated by pulmonology, Dr Dejesus, who started pt on duonebs q4h, solu-medrol 40mg q8h, and empiric CAP Abx regimen with rocephin/azithromycin. Patient was able to tolerate increased time off BiPap and was restarted on PO diet. Steroid were tapered and changed to PO and his abx were changed to PO ceftin and zithromax. Pts symptoms improved.Patient was able to be discharged home with appropriate outpatient followup. 78yo M w/ PMH of CAD s/p CABG, COPD (on 2L home O2), DMII, Dyslipidemia, HTN, PVD presented with SOB x 2 days. Patient stated symptoms started yesterday with mild SOB, but this morning he had much more difficulty breathing and wheezing despite nebulizer treatment. He had a nonproductive cough, which he did not believe has increased much from baseline. Patient admited to noncompliance with COPD maintenance medications as he thought they are not effective and has only been using the albuterol nebulizer. Pt denied fevers, chills, n/v, chest pain, palpitations, abdominal pain or muscle weakness. Denied recent sick contacts or travel. Patient was admitted to [facility] 1 month ago for similar complaints, treated for COPD exacerbation and discharged home with prednisone taper and home oxygen. Patient is a former smoker and quit "many years ago." He was a daily marijuana user and quit 15 years ago.   In the ED, patient's vitals were: T98.6F, /58, , RR 18, SpO2 100% on BiPAP. Significant labs include: Glucose 225. ABG 7.22/86/98/28/97% on 5L NC. RVP negative. Patient given Solu-medrol 125mg x1, Azithromycin 500mg x1, Rocephin 1g x1, Duonebs x2. CXR showed no sign of lobar consolidation vascular congestion or effusion. EKG sig for sinus tachycardia at 125bpm, unchanged from prior on 12/18/17. Patient was admitted to telemetry for acute on chronic hypoxic and hypercarbic respiratory failure due to COPD exacerbation. Patient was started on BiPap. Evaluated by pulmonology, Dr Dejesus, who started pt on duonebs q4h, solu-medrol 40mg q8h, and empiric CAP Abx regimen with rocephin/azithromycin. Patient was able to tolerate increased time off BiPap and was restarted on PO diet. Steroid were tapered and changed to PO and his abx were changed to PO ceftin and zithromax. Pts symptoms improved.Patient was able to be discharged home with appropriate outpatient followup.     The total amount of time spent reviewing the hospital notes, laboratory values, imaging findings, assessing/counseling the patient, discussing with consultant physicians, social work, nursing staff took 70 minutes 78yo M w/ PMH of CAD s/p CABG, COPD (on 2L home O2), DMII, Dyslipidemia, HTN, PVD presented with SOB x 2 days. Patient stated symptoms started yesterday with mild SOB, but this morning he had much more difficulty breathing and wheezing despite nebulizer treatment. He had a nonproductive cough, which he did not believe has increased much from baseline. Patient admited to noncompliance with COPD maintenance medications as he thought they are not effective and has only been using the albuterol nebulizer. Pt denied fevers, chills, n/v, chest pain, palpitations, abdominal pain or muscle weakness. Denied recent sick contacts or travel. Patient was admitted to [facility] 1 month ago for similar complaints, treated for COPD exacerbation and discharged home with prednisone taper and home oxygen. Patient is a former smoker and quit "many years ago." He was a daily marijuana user and quit 15 years ago.   In the ED, patient's vitals were: T98.6F, /58, , RR 18, SpO2 100% on BiPAP. Significant labs include: Glucose 225. ABG 7.22/86/98/28/97% on 5L NC. RVP negative. Patient given Solu-medrol 125mg x1, Azithromycin 500mg x1, Rocephin 1g x1, Duonebs x2. CXR showed no sign of lobar consolidation vascular congestion or effusion. EKG sig for sinus tachycardia at 125bpm, unchanged from prior on 12/18/17. Patient was admitted to telemetry for acute on chronic hypoxic and hypercarbic respiratory failure due to COPD exacerbation. Patient was started on BiPap. Evaluated by pulmonology, Dr Dejesus, who started pt on duonebs q4h, solu-medrol 40mg q8h, and empiric CAP Abx regimen with rocephin/azithromycin. Patient was able to tolerate increased time off BiPap and was restarted on PO diet. Steroid were tapered and changed to PO and his abx were changed to PO ceftin and zithromax. Pts symptoms improved.Patient was able to be discharged home with appropriate outpatient followup.     The total amount of time spent reviewing the hospital notes, laboratory values, imaging findings, assessing/counseling the patient, discussing with consultant physicians, social work, nursing staff took 70 minutes    Message left w/ PCP informing of hospital discharge.

## 2018-01-22 NOTE — DISCHARGE NOTE ADULT - CARE PLAN
Principal Discharge DX:	COPD exacerbation  Goal:	stabilize  Assessment and plan of treatment:	- please continue home medications  - follow up with PMD within 3-5 days  Secondary Diagnosis:	Diabetes Mellitus, Type II  Assessment and plan of treatment:	- continue metformin and glipizide  - encourage carb controlled diet  Secondary Diagnosis:	Dyslipidemia  Assessment and plan of treatment:	- continue atorvastatin  -  Secondary Diagnosis:	Hypertension  Assessment and plan of treatment:	- continue valsartan/hctz Principal Discharge DX:	COPD exacerbation  Goal:	improve symptoms/functional status  Assessment and plan of treatment:	- please continue home medications  - please take medications as prescribed  - follow up with PMD within 3-5 days  Secondary Diagnosis:	Diabetes Mellitus, Type II  Assessment and plan of treatment:	- continue metformin and glipizide  - encourage carb controlled diet  - please follow up with your PCP to follow up regularly to check your fingersticks and diabetes management  Secondary Diagnosis:	Dyslipidemia  Assessment and plan of treatment:	- continue atorvastatin  Secondary Diagnosis:	Hypertension  Assessment and plan of treatment:	- continue valsartan/hctz

## 2018-01-22 NOTE — PROGRESS NOTE ADULT - PROBLEM SELECTOR PLAN 2
- ISS  - daily finger sticks - ISS  - daily finger sticks  - increased sliding scale to moderate dose due to steroid administration as inpatient, will resume home dosing upon hospital discharge which is planned for tomorrow 1/23/18 depending on clinical course

## 2018-01-23 VITALS — OXYGEN SATURATION: 95 %

## 2018-01-23 LAB
ALBUMIN SERPL ELPH-MCNC: 2.9 G/DL — LOW (ref 3.3–5)
ALP SERPL-CCNC: 71 U/L — SIGNIFICANT CHANGE UP (ref 40–120)
ALT FLD-CCNC: 24 U/L — SIGNIFICANT CHANGE UP (ref 12–78)
ANION GAP SERPL CALC-SCNC: 4 MMOL/L — LOW (ref 5–17)
AST SERPL-CCNC: 13 U/L — LOW (ref 15–37)
BASE EXCESS BLDA CALC-SCNC: 13.8 MMOL/L — HIGH (ref -2–2)
BILIRUB SERPL-MCNC: 0.3 MG/DL — SIGNIFICANT CHANGE UP (ref 0.2–1.2)
BLOOD GAS COMMENTS ARTERIAL: SIGNIFICANT CHANGE UP
BLOOD GAS COMMENTS ARTERIAL: SIGNIFICANT CHANGE UP
BUN SERPL-MCNC: 21 MG/DL — SIGNIFICANT CHANGE UP (ref 7–23)
CALCIUM SERPL-MCNC: 9.5 MG/DL — SIGNIFICANT CHANGE UP (ref 8.5–10.1)
CHLORIDE SERPL-SCNC: 96 MMOL/L — SIGNIFICANT CHANGE UP (ref 96–108)
CO2 SERPL-SCNC: 39 MMOL/L — HIGH (ref 22–31)
CREAT SERPL-MCNC: 1.1 MG/DL — SIGNIFICANT CHANGE UP (ref 0.5–1.3)
GLUCOSE SERPL-MCNC: 202 MG/DL — HIGH (ref 70–99)
HCO3 BLDA-SCNC: 36 MMOL/L — HIGH (ref 23–27)
HCT VFR BLD CALC: 36.8 % — LOW (ref 39–50)
HGB BLD-MCNC: 11.7 G/DL — LOW (ref 13–17)
HOROWITZ INDEX BLDA+IHG-RTO: 30 — SIGNIFICANT CHANGE UP
INR BLD: 1 RATIO — SIGNIFICANT CHANGE UP (ref 0.88–1.16)
MAGNESIUM SERPL-MCNC: 2 MG/DL — SIGNIFICANT CHANGE UP (ref 1.6–2.6)
MCHC RBC-ENTMCNC: 28 PG — SIGNIFICANT CHANGE UP (ref 27–34)
MCHC RBC-ENTMCNC: 31.7 GM/DL — LOW (ref 32–36)
MCV RBC AUTO: 88.2 FL — SIGNIFICANT CHANGE UP (ref 80–100)
PCO2 BLDA: 70 MMHG — CRITICAL HIGH (ref 32–46)
PH BLDA: 7.37 — SIGNIFICANT CHANGE UP (ref 7.35–7.45)
PHOSPHATE SERPL-MCNC: 3.8 MG/DL — SIGNIFICANT CHANGE UP (ref 2.5–4.5)
PLATELET # BLD AUTO: 178 K/UL — SIGNIFICANT CHANGE UP (ref 150–400)
PO2 BLDA: 119 MMHG — HIGH (ref 74–108)
POTASSIUM SERPL-MCNC: 4.4 MMOL/L — SIGNIFICANT CHANGE UP (ref 3.5–5.3)
POTASSIUM SERPL-SCNC: 4.4 MMOL/L — SIGNIFICANT CHANGE UP (ref 3.5–5.3)
PROT SERPL-MCNC: 5.8 G/DL — LOW (ref 6–8.3)
PROTHROM AB SERPL-ACNC: 10.9 SEC — SIGNIFICANT CHANGE UP (ref 9.8–12.7)
RBC # BLD: 4.17 M/UL — LOW (ref 4.2–5.8)
RBC # FLD: 13.2 % — SIGNIFICANT CHANGE UP (ref 10.3–14.5)
S PNEUM AG SER QL: SIGNIFICANT CHANGE UP
SAO2 % BLDA: 98 % — HIGH (ref 92–96)
SODIUM SERPL-SCNC: 139 MMOL/L — SIGNIFICANT CHANGE UP (ref 135–145)
WBC # BLD: 7 K/UL — SIGNIFICANT CHANGE UP (ref 3.8–10.5)
WBC # FLD AUTO: 7 K/UL — SIGNIFICANT CHANGE UP (ref 3.8–10.5)

## 2018-01-23 PROCEDURE — 87040 BLOOD CULTURE FOR BACTERIA: CPT

## 2018-01-23 PROCEDURE — 96367 TX/PROPH/DG ADDL SEQ IV INF: CPT

## 2018-01-23 PROCEDURE — 87899 AGENT NOS ASSAY W/OPTIC: CPT

## 2018-01-23 PROCEDURE — 99239 HOSP IP/OBS DSCHRG MGMT >30: CPT

## 2018-01-23 PROCEDURE — 86703 HIV-1/HIV-2 1 RESULT ANTBDY: CPT

## 2018-01-23 PROCEDURE — 96376 TX/PRO/DX INJ SAME DRUG ADON: CPT

## 2018-01-23 PROCEDURE — 85730 THROMBOPLASTIN TIME PARTIAL: CPT

## 2018-01-23 PROCEDURE — 94760 N-INVAS EAR/PLS OXIMETRY 1: CPT

## 2018-01-23 PROCEDURE — 85610 PROTHROMBIN TIME: CPT

## 2018-01-23 PROCEDURE — 83605 ASSAY OF LACTIC ACID: CPT

## 2018-01-23 PROCEDURE — 99221 1ST HOSP IP/OBS SF/LOW 40: CPT

## 2018-01-23 PROCEDURE — 84100 ASSAY OF PHOSPHORUS: CPT

## 2018-01-23 PROCEDURE — 84145 PROCALCITONIN (PCT): CPT

## 2018-01-23 PROCEDURE — 94660 CPAP INITIATION&MGMT: CPT

## 2018-01-23 PROCEDURE — 87449 NOS EACH ORGANISM AG IA: CPT

## 2018-01-23 PROCEDURE — 82803 BLOOD GASES ANY COMBINATION: CPT

## 2018-01-23 PROCEDURE — 87486 CHLMYD PNEUM DNA AMP PROBE: CPT

## 2018-01-23 PROCEDURE — 94664 DEMO&/EVAL PT USE INHALER: CPT

## 2018-01-23 PROCEDURE — 82550 ASSAY OF CK (CPK): CPT

## 2018-01-23 PROCEDURE — 80074 ACUTE HEPATITIS PANEL: CPT

## 2018-01-23 PROCEDURE — 96375 TX/PRO/DX INJ NEW DRUG ADDON: CPT

## 2018-01-23 PROCEDURE — 80048 BASIC METABOLIC PNL TOTAL CA: CPT

## 2018-01-23 PROCEDURE — 71250 CT THORAX DX C-: CPT

## 2018-01-23 PROCEDURE — 87798 DETECT AGENT NOS DNA AMP: CPT

## 2018-01-23 PROCEDURE — 93005 ELECTROCARDIOGRAM TRACING: CPT

## 2018-01-23 PROCEDURE — 80053 COMPREHEN METABOLIC PANEL: CPT

## 2018-01-23 PROCEDURE — 80198 ASSAY OF THEOPHYLLINE: CPT

## 2018-01-23 PROCEDURE — 36600 WITHDRAWAL OF ARTERIAL BLOOD: CPT

## 2018-01-23 PROCEDURE — 83735 ASSAY OF MAGNESIUM: CPT

## 2018-01-23 PROCEDURE — 82962 GLUCOSE BLOOD TEST: CPT

## 2018-01-23 PROCEDURE — 94640 AIRWAY INHALATION TREATMENT: CPT

## 2018-01-23 PROCEDURE — 96372 THER/PROPH/DIAG INJ SC/IM: CPT | Mod: 59

## 2018-01-23 PROCEDURE — 85027 COMPLETE CBC AUTOMATED: CPT

## 2018-01-23 PROCEDURE — 87389 HIV-1 AG W/HIV-1&-2 AB AG IA: CPT

## 2018-01-23 PROCEDURE — 99285 EMERGENCY DEPT VISIT HI MDM: CPT | Mod: 25

## 2018-01-23 PROCEDURE — 96365 THER/PROPH/DIAG IV INF INIT: CPT

## 2018-01-23 PROCEDURE — 87581 M.PNEUMON DNA AMP PROBE: CPT

## 2018-01-23 PROCEDURE — 84484 ASSAY OF TROPONIN QUANT: CPT

## 2018-01-23 PROCEDURE — 87633 RESP VIRUS 12-25 TARGETS: CPT

## 2018-01-23 PROCEDURE — 71045 X-RAY EXAM CHEST 1 VIEW: CPT

## 2018-01-23 RX ORDER — FLUTICASONE FUROATE AND VILANTEROL TRIFENATATE 100; 25 UG/1; UG/1
1 POWDER RESPIRATORY (INHALATION)
Qty: 0 | Refills: 0 | COMMUNITY

## 2018-01-23 RX ORDER — AZITHROMYCIN 500 MG/1
1 TABLET, FILM COATED ORAL
Qty: 2 | Refills: 0 | OUTPATIENT
Start: 2018-01-23 | End: 2018-01-24

## 2018-01-23 RX ORDER — BUDESONIDE AND FORMOTEROL FUMARATE DIHYDRATE 160; 4.5 UG/1; UG/1
2 AEROSOL RESPIRATORY (INHALATION)
Qty: 1 | Refills: 0 | OUTPATIENT
Start: 2018-01-23 | End: 2018-02-21

## 2018-01-23 RX ORDER — INSULIN LISPRO 100/ML
6 VIAL (ML) SUBCUTANEOUS ONCE
Qty: 0 | Refills: 0 | Status: COMPLETED | OUTPATIENT
Start: 2018-01-23 | End: 2018-01-23

## 2018-01-23 RX ORDER — UMECLIDINIUM 62.5 UG/1
1 AEROSOL, POWDER ORAL
Qty: 0 | Refills: 0 | COMMUNITY

## 2018-01-23 RX ORDER — ACLIDINIUM BROMIDE 400 UG/1
400 POWDER, METERED RESPIRATORY (INHALATION)
Qty: 1 | Refills: 0 | OUTPATIENT
Start: 2018-01-23 | End: 2018-02-21

## 2018-01-23 RX ORDER — CEFUROXIME AXETIL 250 MG
1 TABLET ORAL
Qty: 8 | Refills: 0 | OUTPATIENT
Start: 2018-01-23 | End: 2018-01-26

## 2018-01-23 RX ADMIN — Medication 1 TABLET(S): at 12:16

## 2018-01-23 RX ADMIN — CLOPIDOGREL BISULFATE 75 MILLIGRAM(S): 75 TABLET, FILM COATED ORAL at 12:16

## 2018-01-23 RX ADMIN — Medication 1 PACKET(S): at 05:43

## 2018-01-23 RX ADMIN — Medication 12: at 12:16

## 2018-01-23 RX ADMIN — ENOXAPARIN SODIUM 40 MILLIGRAM(S): 100 INJECTION SUBCUTANEOUS at 12:16

## 2018-01-23 RX ADMIN — VALSARTAN 160 MILLIGRAM(S): 80 TABLET ORAL at 05:43

## 2018-01-23 RX ADMIN — PANTOPRAZOLE SODIUM 40 MILLIGRAM(S): 20 TABLET, DELAYED RELEASE ORAL at 05:43

## 2018-01-23 RX ADMIN — Medication 3 MILLILITER(S): at 09:07

## 2018-01-23 RX ADMIN — Medication 0.5 MILLIGRAM(S): at 09:07

## 2018-01-23 RX ADMIN — Medication 6 UNIT(S): at 12:16

## 2018-01-23 RX ADMIN — Medication 3 MILLILITER(S): at 12:59

## 2018-01-23 RX ADMIN — Medication 40 MILLIGRAM(S): at 05:43

## 2018-01-23 RX ADMIN — AZITHROMYCIN 250 MILLIGRAM(S): 500 TABLET, FILM COATED ORAL at 12:16

## 2018-01-23 RX ADMIN — Medication 2: at 08:19

## 2018-01-23 RX ADMIN — Medication 81 MILLIGRAM(S): at 12:16

## 2018-01-23 RX ADMIN — Medication 500 MILLIGRAM(S): at 05:43

## 2018-01-23 RX ADMIN — Medication 3 MILLILITER(S): at 02:52

## 2018-01-23 NOTE — PROGRESS NOTE ADULT - PROBLEM SELECTOR PLAN 3
- Continue ASA and plavix and lipitor
Continue ASA and plavix and lipitor
- Continue ASA and plavix and lipitor

## 2018-01-23 NOTE — PROGRESS NOTE ADULT - PROBLEM SELECTOR PLAN 5
- Continue valsartan with hold parameters
Continue valsartan.  continue to hold diuretics for now until pt is fully tolerating diet  Monitor BP.
- Continue valsartan with hold parameters

## 2018-01-23 NOTE — PROGRESS NOTE ADULT - SUBJECTIVE AND OBJECTIVE BOX
Patient is a 77y old  Male who presents with a chief complaint of SOB (20 Jan 2018 20:57)      HPI:  76yo M w/ PMH of CAD s/p CABG, COPD (on 2L home O2), DMII, Dyslipidemia, HTN, PVD presenting with SOB x 2 days. Patient states symptoms started yesterday with mild SOB, but this morning he had much more difficulty breathing and wheezing despite nebulizer treatment. He has a nonproductive cough, which he does not believe has increased much from baseline. Patient admits to noncompliance with COPD maintenance medications as he thought they are not effective and has only been using the albuterol nebulizer. Pt denies fevers, chills, n/v, chest pain, palpitations, abdominal pain or muscle weakness. Denies recent sick contacts or travel. Patient was admitted to [facility] 1 month ago for similar complaints, treated for COPD exacerbation and discharged home with prednisone taper and home oxygen. Patient is a former smoker and quit "many years ago." He was a daily marijuana user and quit 15 years ago.     In the ED, patient's vitals were: T98.6F, /58, , RR 18, SpO2 100% on BiPAP. Significant labs include: Glucose 225. ABG 7.22/86/98/28/97% on 5L NC. RVP negative. Patient given Solu-medrol 125mg x1, Azithromycin 500mg x1, Rocephin 1g x1, Duonebs x2.   CXR: No sign of lobar consolidation vascular congestion or effusion.  EKG: sinus tachycardia at 125bpm, unchanged from prior on 12/18/17. (20 Jan 2018 13:21)      INTERVAL HPI/OVERNIGHT EVENTS: Patient seen and evaluated at bedside, states that he is breathing better and is ready to be discharged home. No complaints today.  Telemetry: sinus tachy       ROS:  CONSTITUTIONAL: denies fever, chills, fatigue, weakness  HEENT: denies blurred vision, sore throat  CARDIOVASCULAR: denies chest pain, chest pressure, palpitations  RESPIRATORY: improving SOB, tolerating NC well, Admits to intermittent cough   GASTROINTESTINAL: denies nausea, vomiting, diarrhea, abdominal pain  GENITOURINARY: denies dysuria, discharge  NEUROLOGICAL: denies numbness, headache, focal weakness  MUSCULOSKELETAL: denies new joint pain, muscle aches  All other systems reviewed and are negative    Vital Signs Last 24 Hrs  T(C): 37.5 (23 Jan 2018 08:50), Max: 37.5 (23 Jan 2018 08:50)  T(F): 99.5 (23 Jan 2018 08:50), Max: 99.5 (23 Jan 2018 08:50)  HR: 92 (23 Jan 2018 09:10) (52 - 102)  BP: 98/65 (23 Jan 2018 08:50) (98/65 - 126/80)  BP(mean): --  RR: 17 (23 Jan 2018 08:50) (17 - 19)  SpO2: 98% (23 Jan 2018 09:10) (96% - 100%)    I&O's Summary    22 Jan 2018 07:01  -  23 Jan 2018 07:00  --------------------------------------------------------  IN: 0 mL / OUT: 550 mL / NET: -550 mL    23 Jan 2018 07:01  -  23 Jan 2018 09:27  --------------------------------------------------------  IN: 0 mL / OUT: 200 mL / NET: -200 mL        PE:  GENERAL: patient appears comfortable, no acute distress, sitting in bed eating breakfast   EYES: sclera clear, no exudates  NECK: supple, soft, no thyromegaly noted  LUNGS: diffuse inspiratory/expiratory wheezing w/ prolonged expiratory wheezing  HEART: soft S1/S2, regular rate and rhythm  GASTROINTESTINAL: abdomen is soft, nontender, nondistended, normoactive bowel sounds, no palpable masses  MUSCULOSKELETAL: no clubbing or cyanosis, no obvious deformity  NEUROLOGIC: awake, alert, oriented x3  PSYCH: pleasant, appropriate, mood is good      MEDICATIONS  (STANDING):  ALBUTerol/ipratropium for Nebulization 3 milliLiter(s) Nebulizer every 4 hours  aspirin enteric coated 81 milliGRAM(s) Oral daily  atorvastatin 40 milliGRAM(s) Oral at bedtime  azithromycin   Tablet 250 milliGRAM(s) Oral daily  buDESOnide   0.5 milliGRAM(s) Respule 0.5 milliGRAM(s) Inhalation two times a day  cefuroxime   Tablet 500 milliGRAM(s) Oral every 12 hours  clopidogrel Tablet 75 milliGRAM(s) Oral daily  dextrose 5%. 1000 milliLiter(s) (50 mL/Hr) IV Continuous <Continuous>  dextrose 50% Injectable 12.5 Gram(s) IV Push once  dextrose 50% Injectable 25 Gram(s) IV Push once  dextrose 50% Injectable 25 Gram(s) IV Push once  enoxaparin Injectable 40 milliGRAM(s) SubCutaneous daily  insulin lispro (HumaLOG) corrective regimen sliding scale   SubCutaneous Before meals and at bedtime  melatonin 5 milliGRAM(s) Oral at bedtime  methylPREDNISolone sodium succinate Injectable 40 milliGRAM(s) IV Push every 12 hours  multivitamin/minerals 1 Tablet(s) Oral daily  pantoprazole    Tablet 40 milliGRAM(s) Oral before breakfast  tamsulosin 0.4 milliGRAM(s) Oral at bedtime  valsartan 160 milliGRAM(s) Oral daily    MEDICATIONS  (PRN):  dextrose Gel 1 Dose(s) Oral once PRN Blood Glucose LESS THAN 70 milliGRAM(s)/deciliter  glucagon  Injectable 1 milliGRAM(s) IntraMuscular once PRN Glucose LESS THAN 70 milligrams/deciliter          LABS:                         11.7   7.0   )-----------( 178      ( 23 Jan 2018 07:38 )             36.8     01-23    139  |  96  |  21  ----------------------------<  202<H>  4.4   |  39<H>  |  1.10    Ca    9.5      23 Jan 2018 07:38  Phos  3.8     01-23  Mg     2.0     01-23    TPro  5.8<L>  /  Alb  2.9<L>  /  TBili  0.3  /  DBili  x   /  AST  13<L>  /  ALT  24  /  AlkPhos  71  01-23          CAPILLARY BLOOD GLUCOSE      POCT Blood Glucose.: 195 mg/dL (23 Jan 2018 07:32)  POCT Blood Glucose.: 252 mg/dL (22 Jan 2018 07:23)  POCT Blood Glucose.: 184 mg/dL (21 Jan 2018 11:48)  POCT Blood Glucose.: 196 mg/dL (21 Jan 2018 07:20)  POCT Blood Glucose.: 218 mg/dL (21 Jan 2018 05:07)  POCT Blood Glucose.: 217 mg/dL (20 Jan 2018 22:59)    ABG - ( 23 Jan 2018 06:17 )  pH: 7.37  /  pCO2: 70    /  pO2: 119   / HCO3: 36    / Base Excess: 13.8  /  SaO2: 98            Culture Results:   No growth to date. (01.20.18 @ 15:34)    < from: CT Chest No Cont (01.22.18 @ 11:51) >  EXAM:  CT CHEST                            PROCEDURE DATE:  01/22/2018          INTERPRETATION:  CLINICAL INFORMATION:  Shortness of breath, COPD   exacerbation.    PROCEDURE:  Using multislice helical CT, 2.5 mm sections were obtained   from the thoracic inlet through the lung bases.  Multiplanar reformatted images.    COMPARISON: Chest radiograph 1/20/2018.    FINDINGS:      The lungs are hyperinflated with bilateral centrilobular emphysematous   changes.    The central airways remain patent.  There are mild aerosolized secretions trachea and left mainstem bronchus.    There are mild areas of linear scarring/fibrosis left lingula and lower   lobes.  There are few scattered subpleural nodular opacities.    There is minimal scarring at the right lung apex.    No lobar lung consolidation, significant pleural effusion or pneumothorax   is noted.    No enlarged mediastinal or hilar lymphadenopathy is noted.    Patient is status post sternotomy.  Coronary artery calcifications are present. No pericardial effusion is   noted.    Cholelithiasis is noted.    Impression:    Emphysematous disease.    No acute pulmonary process demonstrated as discussed above.    YUE COLEY M.D., ATTENDING RADIOLOGIST  This document has been electronically signed. Jan 22 2018 12:02PM        < end of copied text >      < from: Xray Chest 1 View AP/PA (01.20.18 @ 09:58) >  EXAM:  XR CHEST AP OR PA 1V                            PROCEDURE DATE:  01/20/2018      INTERPRETATION:  Clinical information: Shortness of breath    Portable study, 9:50 AM    Comparison exam dated 12/18/2017.    Cardiac monitor leads present.Sternal sutures present. Flattening left   hemidiaphragm, unchanged. No sign of lobar consolidation vascular   congestion or effusion. Heart size within normal limits. Hilar regions,   mediastinal contours intact. No acute osseous abnormalities.    IMPRESSION:    See above report      DANNY MCGHEE M.D.,ATTENDING RADIOLOGIST  This document has been electronically signed. Jan 20 2018 10:15AM    < end of copied text >

## 2018-01-23 NOTE — PROGRESS NOTE ADULT - PROBLEM SELECTOR PLAN 1
- Continue BIPAP support at night, patient comfortable on NC during the day   - Pulmonology consutled (Dr. Dejesus)  - continue duonebs q4h, solu-medrol 40mg q12h  - continue PO abx with ceftin and zithromax starting 1/23  - taper steroids dosing per pulm rec   - RVP negative  - legionella negative  - CT chest showed emphysema, no other acute findings, will switch to PO steroid for discharge   - Continuous pulse ox  - advanced diet as pt can tolerate diet w/ removal of bipap for meals  - D/C fluids, patient able to tolerate PO diet  - f/u AM labs

## 2018-01-23 NOTE — PROGRESS NOTE ADULT - SUBJECTIVE AND OBJECTIVE BOX
VITALS/LABS  1/23/2018 afeb 99 96/65 16 91%   1/23/2018 3a BPAP 16/5/.3  1/23/2018 W 7 Hb 11.7 Plt 178 INR 1 Na 139 K 4.4 CO2 39 Cr 1.1   REVIEW OF SYMPTOMS    Able to give ROS  Yes     RELIABLE No   CONSTITUTIONAL Weakness Yes  Chills No Vision changes No  ENDOCRINE No unexplained hair loss No heat or cold intolerance    ALLERGY No hives  Sore throat No   RESP Coughing blood  Shortness of breath YES   NEURO No Headache  Confusion Pain neck No   CARDIAC No Chest pain No Palpitations   GI No Pain abdomen NO   Vomiting NO   PHYSICAL EXAM    HEENT Unremarkable PERRLA atraumatic   RESP Fair air entry EXP prolonged    Harsh breath sound Resp distres mild   CARDIAC S1 S2 No S3     NO JVD    ABDOMEN SOFT BS PRESENT NOT DISTENDED No hepatosplenomegaly PEDAL EDEMA present No calf tenderness  NO rash   GENERAL Not TOXIC looking  [facility]  [facility]2018 ADMISSION [facility]   12/18-12/21/2017 HOSPITAL COURSE  [facility]                                                                                           Patient is a 76 yo male former smoker quit 30 y Not on home O2 Had home nebs utd with flu and pneumonia vaccinations with pmhx of COPD, PVD, CAD, CABG DMII, HTN, HLD Pilonidal cyst admitted [facility] 12/18-12/21/2017  with COPD exacerbation. RVP as well as Pct were neg Pt was managed with azithro BD ICS and IVCS  DM was managed with iss 12/18 V duplex n On 12/21 RA pulse ox dropped to 66% on ambulating in RA and home O2 was ordered  HPI 1/20/2018 ADMISSION [facility] P   77 m brought in by EMS on CPAP wheezing with hop radually progressive SOB x 1 week No fever cough chest pain abd pain     HOSPITAL COURSE PROBLEM ASSESSMENT PLAN 1/20/2018 ADMISSION [facility] P                                       RESP INFECTION  1/20 RVP n   1/20-1/21/2018 W 10.5-7.1   1/21 HIV n 1/20 pct n  1/20/2018 CXR COPD Sternal sutures Flattened l diaphragm   ACUTE HYPERCAPNIC RESP FAILURE   1/20/2018 10a 5l 722/86/98      1/20 Check ABG on BPAP    1/20/2018 1p 12/5/.4 722/87/112 --> 16/5/.3  1/21/2018 bpap 16/5/.3 734/70/84  azithro (1/20) rocephin (1/20)   1/23/2018 2.5l 737/70/119   COPD EX   1/20 BD Steroids  abio                 MERRILL   1/20-1/21 Cr 1.3-1.1  CAD   ASA 81 (1/20) plavix 75 (1/20)    12/20/2017 ECHO  n lvsf diast dysfn ef 55%    GLOBAL ISSUE/BEST PRACTICE:        PROBLEM: Analgesia:     na                        PROBLEM: Sedation:     na               PROBLEM: HOB elevation:   y             PROBLEM: Stress ulcer proph:    na                      PROBLEM: VTE prophylaxis:      hsc (1/20) --> lvnx 40 (1/20)                   PROBLEM: Glycemic control:    iss (1/20)    PROBLEM: Nutrition:    npo (1/20) --> Cons carb crump (1/21)           PROBLEM: Advanced directive: na     PROBLEM: Allergies:  na      TIME SPENT Over 25 minutes aggregate care time spent on encounter; activities included   direct patient care, counseling and/or coordinating care reviewing notes, lab data/ imaging , discussion with multidisciplinary team/ patient  /family. Risks, benefits, alternatives  discussed in detail. Proper antibiotic use issues addressed Questions/concerns  were addressed  as  best as possible

## 2018-01-23 NOTE — PROGRESS NOTE ADULT - PROBLEM SELECTOR PLAN 2
- ISS  - daily finger sticks  - increased sliding scale to moderate dose due to steroid administration as inpatient, will resume home dosing upon hospital discharge which is planned for today 1/23/18 depending on clinical course

## 2018-01-23 NOTE — PROVIDER CONTACT NOTE (OTHER) - ACTION/TREATMENT ORDERED:
md aware. came and spoke with wife and patient.pt is getting steroids.pt d/c home today.ok to d/c home.no new orders.

## 2018-01-23 NOTE — PROGRESS NOTE ADULT - PROBLEM SELECTOR PROBLEM 1
Acute on chronic respiratory failure with hypoxia and hypercapnia

## 2018-01-23 NOTE — PROGRESS NOTE ADULT - ASSESSMENT
76yo M w/ PMH of CAD s/p CABG, COPD (on 2L home O2), DM2, Dyslipidemia, HTN, PVD presenting with SOB x2 days admitted with acute on chronic hypoxic and hypercarbic respiratory failure due to COPD exacerbation.
78yo M w/ PMH of CAD s/p CABG, COPD (on 2L home O2), DM2, Dyslipidemia, HTN, PVD presenting with SOB x2 days admitted with acute on chronic hypoxic and hypercarbic respiratory failure due to COPD exacerbation.
76yo M w/ PMH of CAD s/p CABG, COPD (on 2L home O2), DM2, Dyslipidemia, HTN, PVD presenting with SOB x2 days admitted with acute on chronic hypoxic and hypercarbic respiratory failure due to COPD exacerbation.

## 2018-01-23 NOTE — PROGRESS NOTE ADULT - PROBLEM SELECTOR PLAN 8
- DVT ppx w/ lovenox  - GI ppx w/ protonix while on steroids
- DVT ppx w/ lovenox  - GI ppx w/ protonix while on steroids
DVT ppx w/ lovenox  GI ppx w/ protonix while on

## 2018-02-26 ENCOUNTER — APPOINTMENT (OUTPATIENT)
Dept: VASCULAR SURGERY | Facility: CLINIC | Age: 78
End: 2018-02-26
Payer: MEDICARE

## 2018-02-26 VITALS
BODY MASS INDEX: 30.1 KG/M2 | WEIGHT: 215 LBS | HEART RATE: 109 BPM | HEIGHT: 71 IN | DIASTOLIC BLOOD PRESSURE: 72 MMHG | SYSTOLIC BLOOD PRESSURE: 101 MMHG

## 2018-02-26 PROCEDURE — 93926 LOWER EXTREMITY STUDY: CPT

## 2018-02-26 PROCEDURE — 93978 VASCULAR STUDY: CPT

## 2018-02-26 PROCEDURE — 99214 OFFICE O/P EST MOD 30 MIN: CPT

## 2018-02-26 RX ORDER — PEN NEEDLE, DIABETIC 29 G X1/2"
31G X 8 MM NEEDLE, DISPOSABLE MISCELLANEOUS
Qty: 400 | Refills: 0 | Status: ACTIVE | COMMUNITY
Start: 2017-07-19

## 2018-02-26 RX ORDER — INSULIN ASPART 100 [IU]/ML
100 INJECTION, SOLUTION INTRAVENOUS; SUBCUTANEOUS
Qty: 15 | Refills: 0 | Status: ACTIVE | COMMUNITY
Start: 2017-07-19

## 2018-04-18 NOTE — ED ADULT NURSE NOTE - PMH
pt made aware
Asymptomatic Peripheral Vascular Disease    CAD (Coronary Artery Disease)    COPD (chronic obstructive pulmonary disease)    Diabetes Mellitus, Type II    Dyslipidemia    Hypertension    Osteomyelitis

## 2018-05-03 ENCOUNTER — EMERGENCY (EMERGENCY)
Facility: HOSPITAL | Age: 78
LOS: 1 days | Discharge: HOME | End: 2018-05-03
Attending: EMERGENCY MEDICINE
Payer: COMMERCIAL

## 2018-05-03 VITALS
OXYGEN SATURATION: 97 % | HEART RATE: 90 BPM | DIASTOLIC BLOOD PRESSURE: 78 MMHG | SYSTOLIC BLOOD PRESSURE: 130 MMHG | TEMPERATURE: 98 F | RESPIRATION RATE: 19 BRPM

## 2018-05-03 VITALS
OXYGEN SATURATION: 96 % | HEART RATE: 88 BPM | DIASTOLIC BLOOD PRESSURE: 78 MMHG | RESPIRATION RATE: 18 BRPM | SYSTOLIC BLOOD PRESSURE: 138 MMHG

## 2018-05-03 LAB
ALBUMIN SERPL ELPH-MCNC: 4.2 G/DL — SIGNIFICANT CHANGE UP (ref 3.3–5)
ALP SERPL-CCNC: 84 U/L — SIGNIFICANT CHANGE UP (ref 40–120)
ALT FLD-CCNC: 15 U/L — SIGNIFICANT CHANGE UP (ref 10–45)
ANION GAP SERPL CALC-SCNC: 13 MMOL/L — SIGNIFICANT CHANGE UP (ref 5–17)
APTT BLD: 37.8 SEC — HIGH (ref 27.5–37.4)
AST SERPL-CCNC: 20 U/L — SIGNIFICANT CHANGE UP (ref 10–40)
BASOPHILS # BLD AUTO: 0.1 K/UL — SIGNIFICANT CHANGE UP (ref 0–0.2)
BASOPHILS NFR BLD AUTO: 0.9 % — SIGNIFICANT CHANGE UP (ref 0–2)
BILIRUB SERPL-MCNC: 0.4 MG/DL — SIGNIFICANT CHANGE UP (ref 0.2–1.2)
BUN SERPL-MCNC: 15 MG/DL — SIGNIFICANT CHANGE UP (ref 7–23)
CALCIUM SERPL-MCNC: 10.3 MG/DL — SIGNIFICANT CHANGE UP (ref 8.4–10.5)
CHLORIDE SERPL-SCNC: 99 MMOL/L — SIGNIFICANT CHANGE UP (ref 96–108)
CO2 SERPL-SCNC: 30 MMOL/L — SIGNIFICANT CHANGE UP (ref 22–31)
CREAT SERPL-MCNC: 1.17 MG/DL — SIGNIFICANT CHANGE UP (ref 0.5–1.3)
EOSINOPHIL # BLD AUTO: 0.6 K/UL — HIGH (ref 0–0.5)
EOSINOPHIL NFR BLD AUTO: 9.6 % — HIGH (ref 0–6)
GAS PNL BLDV: SIGNIFICANT CHANGE UP
GLUCOSE SERPL-MCNC: 147 MG/DL — HIGH (ref 70–99)
HCT VFR BLD CALC: 43.4 % — SIGNIFICANT CHANGE UP (ref 39–50)
HGB BLD-MCNC: 13.8 G/DL — SIGNIFICANT CHANGE UP (ref 13–17)
INR BLD: 1.04 RATIO — SIGNIFICANT CHANGE UP (ref 0.88–1.16)
LYMPHOCYTES # BLD AUTO: 1.8 K/UL — SIGNIFICANT CHANGE UP (ref 1–3.3)
LYMPHOCYTES # BLD AUTO: 28.1 % — SIGNIFICANT CHANGE UP (ref 13–44)
MCHC RBC-ENTMCNC: 28.6 PG — SIGNIFICANT CHANGE UP (ref 27–34)
MCHC RBC-ENTMCNC: 31.8 GM/DL — LOW (ref 32–36)
MCV RBC AUTO: 90.1 FL — SIGNIFICANT CHANGE UP (ref 80–100)
MONOCYTES # BLD AUTO: 0.6 K/UL — SIGNIFICANT CHANGE UP (ref 0–0.9)
MONOCYTES NFR BLD AUTO: 8.8 % — SIGNIFICANT CHANGE UP (ref 2–14)
NEUTROPHILS # BLD AUTO: 3.3 K/UL — SIGNIFICANT CHANGE UP (ref 1.8–7.4)
NEUTROPHILS NFR BLD AUTO: 52.5 % — SIGNIFICANT CHANGE UP (ref 43–77)
PLATELET # BLD AUTO: 240 K/UL — SIGNIFICANT CHANGE UP (ref 150–400)
POTASSIUM SERPL-MCNC: 3.9 MMOL/L — SIGNIFICANT CHANGE UP (ref 3.5–5.3)
POTASSIUM SERPL-SCNC: 3.9 MMOL/L — SIGNIFICANT CHANGE UP (ref 3.5–5.3)
PROT SERPL-MCNC: 7.1 G/DL — SIGNIFICANT CHANGE UP (ref 6–8.3)
PROTHROM AB SERPL-ACNC: 11.2 SEC — SIGNIFICANT CHANGE UP (ref 9.8–12.7)
RAPID RVP RESULT: SIGNIFICANT CHANGE UP
RBC # BLD: 4.81 M/UL — SIGNIFICANT CHANGE UP (ref 4.2–5.8)
RBC # FLD: 12.4 % — SIGNIFICANT CHANGE UP (ref 10.3–14.5)
SODIUM SERPL-SCNC: 142 MMOL/L — SIGNIFICANT CHANGE UP (ref 135–145)
WBC # BLD: 6.3 K/UL — SIGNIFICANT CHANGE UP (ref 3.8–10.5)
WBC # FLD AUTO: 6.3 K/UL — SIGNIFICANT CHANGE UP (ref 3.8–10.5)

## 2018-05-03 PROCEDURE — 83605 ASSAY OF LACTIC ACID: CPT

## 2018-05-03 PROCEDURE — 87581 M.PNEUMON DNA AMP PROBE: CPT

## 2018-05-03 PROCEDURE — 99284 EMERGENCY DEPT VISIT MOD MDM: CPT | Mod: 25

## 2018-05-03 PROCEDURE — 82435 ASSAY OF BLOOD CHLORIDE: CPT

## 2018-05-03 PROCEDURE — 96374 THER/PROPH/DIAG INJ IV PUSH: CPT

## 2018-05-03 PROCEDURE — 94640 AIRWAY INHALATION TREATMENT: CPT

## 2018-05-03 PROCEDURE — 71046 X-RAY EXAM CHEST 2 VIEWS: CPT | Mod: 26

## 2018-05-03 PROCEDURE — 87633 RESP VIRUS 12-25 TARGETS: CPT

## 2018-05-03 PROCEDURE — 93010 ELECTROCARDIOGRAM REPORT: CPT

## 2018-05-03 PROCEDURE — 84295 ASSAY OF SERUM SODIUM: CPT

## 2018-05-03 PROCEDURE — 82947 ASSAY GLUCOSE BLOOD QUANT: CPT

## 2018-05-03 PROCEDURE — 85610 PROTHROMBIN TIME: CPT

## 2018-05-03 PROCEDURE — 82803 BLOOD GASES ANY COMBINATION: CPT

## 2018-05-03 PROCEDURE — 82330 ASSAY OF CALCIUM: CPT

## 2018-05-03 PROCEDURE — 85014 HEMATOCRIT: CPT

## 2018-05-03 PROCEDURE — 85027 COMPLETE CBC AUTOMATED: CPT

## 2018-05-03 PROCEDURE — 93970 EXTREMITY STUDY: CPT | Mod: 26

## 2018-05-03 PROCEDURE — 71046 X-RAY EXAM CHEST 2 VIEWS: CPT

## 2018-05-03 PROCEDURE — 87486 CHLMYD PNEUM DNA AMP PROBE: CPT

## 2018-05-03 PROCEDURE — 93005 ELECTROCARDIOGRAM TRACING: CPT

## 2018-05-03 PROCEDURE — 80053 COMPREHEN METABOLIC PANEL: CPT

## 2018-05-03 PROCEDURE — 84132 ASSAY OF SERUM POTASSIUM: CPT

## 2018-05-03 PROCEDURE — 85730 THROMBOPLASTIN TIME PARTIAL: CPT

## 2018-05-03 PROCEDURE — 87798 DETECT AGENT NOS DNA AMP: CPT

## 2018-05-03 PROCEDURE — 84484 ASSAY OF TROPONIN QUANT: CPT

## 2018-05-03 PROCEDURE — 93970 EXTREMITY STUDY: CPT

## 2018-05-03 PROCEDURE — 83880 ASSAY OF NATRIURETIC PEPTIDE: CPT

## 2018-05-03 RX ORDER — IPRATROPIUM/ALBUTEROL SULFATE 18-103MCG
3 AEROSOL WITH ADAPTER (GRAM) INHALATION ONCE
Qty: 0 | Refills: 0 | Status: COMPLETED | OUTPATIENT
Start: 2018-05-03 | End: 2018-05-03

## 2018-05-03 RX ORDER — SODIUM CHLORIDE 9 MG/ML
1000 INJECTION INTRAMUSCULAR; INTRAVENOUS; SUBCUTANEOUS ONCE
Qty: 0 | Refills: 0 | Status: COMPLETED | OUTPATIENT
Start: 2018-05-03 | End: 2018-05-03

## 2018-05-03 RX ADMIN — Medication 3 MILLILITER(S): at 18:21

## 2018-05-03 RX ADMIN — SODIUM CHLORIDE 1000 MILLILITER(S): 9 INJECTION INTRAMUSCULAR; INTRAVENOUS; SUBCUTANEOUS at 19:01

## 2018-05-03 NOTE — ED PROVIDER NOTE - PHYSICAL EXAMINATION
GENERAL: Well appearing, well nourished, awake, alert and in NAD  CARDIAC: Regular rate, regular rhythm.  Heart sounds S1, S2, no S3, S4. No murmurs, rubs or gallops.  RESPIRATORY: INSPIRATORY AND EXPIRATORY WHEEZES BILAT ANTERIOR AND POSTERIOR LUNG SCHMID; no ronchi/stridor; pt breathing and speaking comfortably with no increased WOB, no accessory mm. use, no intercostal retractions, no nasal flaring, pt satting 95 on RA.   GI: no ecchymosis, erythema, or lacerations, abdomen soft, non-distended, non-tender, no rebound or guarding.   extremities: no pedal edema or unilateral leg swelling bilat GENERAL: Well appearing, well nourished, awake, alert and in NAD  CARDIAC: Regular rate, regular rhythm.  Heart sounds S1, S2, no S3, S4. No murmurs, rubs or gallops.  RESPIRATORY: INSPIRATORY AND EXPIRATORY WHEEZES BILAT ANTERIOR AND POSTERIOR LUNG SCHMID; no ronchi/stridor/crackles; pt breathing and speaking comfortably with no increased WOB, no accessory mm. use, no intercostal retractions, no nasal flaring, pt satting 95 on RA.   GI: no ecchymosis, erythema, or lacerations, abdomen soft, non-distended, non-tender, no rebound or guarding.   extremities: no pedal edema or unilateral leg swelling bilat

## 2018-05-03 NOTE — ED PROVIDER NOTE - CARE PLAN
Principal Discharge DX:	Wheezing  Secondary Diagnosis:	Leg swelling Principal Discharge DX:	Wheezing  Secondary Diagnosis:	Leg swelling  Secondary Diagnosis:	COPD exacerbation

## 2018-05-03 NOTE — ED PROVIDER NOTE - MEDICAL DECISION MAKING DETAILS
78 yo M with PMH of DM, COPD (sleeps with 1L of O2), PAD (s/p stents in both legs most recently last year) with 1 day hx of bilat LE swelling associated with increased LA from baseline, sent in by pcp to r/o DVT/PE. On exam, O2sat 95 on RA, VS  otherwise wnl (temp pending), bilat anterior and posterior wheezes, no pedal edema bilat, pt in NAD. sx likely represent COPD exacerbation, other possibilities include CHF exacerbation (recent echo showed e/o diastolic HF), vs flu vs PE/DVT. basic labs, CXR pending, duoneb for tx (pt already received solumedrol en route), levaquin for possible COPD exacerbation, US BLE to r/o DVT. will reassess.

## 2018-05-03 NOTE — ED PROVIDER NOTE - ATTENDING CONTRIBUTION TO CARE
77 yom pmhx chf , mild diastolic chf, on home 02 only 1 L only at night and does not use 02 during the day, presents with mild leg swelling and mildly incr sob. no cough or fever. states pt himself has not noticed any incr leg swelling, however wife feels they are slightly more swollen than usual. pt was sitting out int he hot sun yesterday. pt states this AM in the shower had midlly incr sob due to the humidity/ steam that improved after getting out of the shower. went to his pmd for a routine check up after having blood work done 3 mo ago - states pmd noted some wheezing and agreed that legs were mildly more swollen - sent in for "ct of chest" to [facility] for blood clot. no recent sick contacts. admission few mo ago for copd exac managed w abx/ steroids.     ROS:   constitutional - no fever, no chills  eyes - no visual changes, no redness  eent - no sore throat, no nasal congestion  cvs - no chest pain, + mild leg swelling  resp - + shortness of breath, no cough  gi - no abdominal pain, no vomiting, no diarrhea  gu - no dysuria, no hematuria  msk - no acute back pain, no joint swelling  skin - no rashes, no jaundice  neuro - no headache, no focal weakness  psych - no acute mental health issue     Physical Exam:   constitutional - well appearing, awake and alert, oriented x3  head - no external evidence of trauma  cvs - rrr, no murmurs, trace bilat peripheral edema, no calf tenderness.   resp - wheezing in all lung fields. satting 96 on RA and 100 on 2L.   gi - abdomen soft and nontender, no rigidity, guarding or rebound, bowel sounds present  msk - moving all extremities spontaneously  neuro - alert and oriented x3, no focal deficits, CNs 2-12 grossly intact  skin- no jaundice, warm and dry  psych - mood and affect wnl, no apparent risk to self or others     sent in for r/o PE however pt without any c/o sob at this time, no hypoxia even on RA, no tachycardia- unlikely central PE; will do dvt studies. no specific risks - no prolonged immob. wheezing in all lung fields more c/w copd exac or possibly mild viral syndrome. unlikely chf as no signif swelling however see check bnp/xray. 77 yom pmhx chf , mild diastolic chf, on home 02 only 1 L only at night and does not use 02 during the day, presents with mild leg swelling and mildly incr sob. no cough or fever. states pt himself has not noticed any incr leg swelling, however wife feels they are slightly more swollen than usual. pt was sitting out int he hot sun yesterday. pt states this AM in the shower had midlly incr sob due to the humidity/ steam that improved after getting out of the shower. went to his pmd for a routine check up after having blood work done 3 mo ago - states pmd noted some wheezing and agreed that legs were mildly more swollen - sent in for "ct of chest" to [facility] for blood clot. no recent sick contacts. admission few mo ago for copd exac managed w abx/ steroids.     ROS:   constitutional - no fever, no chills  eyes - no visual changes, no redness  eent - no sore throat, no nasal congestion  cvs - no chest pain, + mild leg swelling  resp - + shortness of breath, no cough  gi - no abdominal pain, no vomiting, no diarrhea  gu - no dysuria, no hematuria  msk - no acute back pain, no joint swelling  skin - no rashes, no jaundice  neuro - no headache, no focal weakness  psych - no acute mental health issue     Physical Exam:   constitutional - well appearing, awake and alert, oriented x3  head - no external evidence of trauma  cvs - rrr, no murmurs, trace bilat peripheral edema, no calf tenderness.   resp - wheezing in all lung fields. satting 96 on RA and 100 on 2L.   gi - abdomen soft and nontender, no rigidity, guarding or rebound, bowel sounds present  msk - moving all extremities spontaneously  neuro - alert and oriented x3, no focal deficits, CNs 2-12 grossly intact  skin- no jaundice, warm and dry  psych - mood and affect wnl, no apparent risk to self or others     sent in for r/o PE however pt without any c/o sob at this time, no hypoxia even on RA, no tachycardia, no CP - unlikely central PE; will do dvt studies. no specific risks - no prolonged immob. wheezing in all lung fields more c/w copd exac or possibly mild viral syndrome. unlikely chf as no signif swelling however see check bnp/xray.  dvt study negative - much more likely to be copd exac. pt has f/u appt w his pulm in 4 days from now. strict return precautions. additional verbal instructions regarding diagnosis, return precautions and follow up plan given to pt and/or family. SHAKA Martell MD 77 yom pmhx chf , mild diastolic chf, on home 02 only 1 L only at night and does not use 02 during the day, presents with mild leg swelling and mildly incr sob. no cough or fever. states pt himself has not noticed any incr leg swelling, however wife feels they are slightly more swollen than usual. pt was sitting out int he hot sun yesterday. pt states this AM in the shower had midlly incr sob due to the humidity/ steam that improved after getting out of the shower. went to his pmd for a routine check up after having blood work done 3 mo ago - states pmd noted some wheezing and agreed that legs were mildly more swollen - sent in for "ct of chest" to [facility] for blood clot. no recent sick contacts. admission few mo ago for copd exac managed w abx/ steroids.     ROS:   constitutional - no fever, no chills  eyes - no visual changes, no redness  eent - no sore throat, no nasal congestion  cvs - no chest pain, + mild leg swelling  resp - + shortness of breath, no cough  gi - no abdominal pain, no vomiting, no diarrhea  gu - no dysuria, no hematuria  msk - no acute back pain, no joint swelling  skin - no rashes, no jaundice  neuro - no headache, no focal weakness  psych - no acute mental health issue     Physical Exam:   constitutional - well appearing, awake and alert, oriented x3  head - no external evidence of trauma  cvs - rrr, no murmurs, trace bilat peripheral edema, no calf tenderness.   resp - wheezing in all lung fields. satting 96 on RA and 100 on 2L.   gi - abdomen soft and nontender, no rigidity, guarding or rebound, bowel sounds present  msk - moving all extremities spontaneously  neuro - alert and oriented x3, no focal deficits, CNs 2-12 grossly intact  skin- no jaundice, warm and dry  psych - mood and affect wnl, no apparent risk to self or others     sent in for r/o PE however pt without any c/o sob at this time, no hypoxia even on RA, no tachycardia, no CP - unlikely central PE; will do dvt studies. no specific risks - no prolonged immob. wheezing in all lung fields more c/w copd exac or possibly mild viral syndrome. unlikely chf as no signif swelling however see check bnp/xray.  dvt study negative - much more likely to be copd exac. pt has f/u appt w his pulm in 4 days from now- will start short course steroids; pt states has adequate nebs/ inhalers at home. strict return precautions. additional verbal instructions regarding diagnosis, return precautions and follow up plan given to pt and/or family. SHAKA Martell MD

## 2018-05-03 NOTE — ED PROVIDER NOTE - OBJECTIVE STATEMENT
78 yo M with PMH of DM, COPD (sleeps with 1L of O2), PAD (s/p stents in both legs most recently last year) with 1 day hx of bilat LE swelling associated with increased LA from baseline. Pt returned from 7 hour road trip from [place] 4 d/a. Pt went PCP today for a routine checkup, PCP noticed swelling and wheezing and was concerned for DVT, sent pt by EMS to ED. Pt on ASA 81 and plavix.     ROS positive: bilat leg swelling, LA  ROS negative: f/c, CP, SOB, hemoptysis, n/v, abdominal, diarrhea, hematachezia, bilat leg swelling,     meds: incruse, brio, ventolin, losartan, hctz, asa, plavix, metformin, glyburide     PCP: Sarah Avila  surgeon: Marcio

## 2018-06-04 ENCOUNTER — APPOINTMENT (OUTPATIENT)
Dept: VASCULAR SURGERY | Facility: CLINIC | Age: 78
End: 2018-06-04
Payer: MEDICARE

## 2018-06-04 VITALS
SYSTOLIC BLOOD PRESSURE: 141 MMHG | WEIGHT: 210 LBS | HEIGHT: 71 IN | DIASTOLIC BLOOD PRESSURE: 76 MMHG | HEART RATE: 101 BPM | BODY MASS INDEX: 29.4 KG/M2

## 2018-06-04 DIAGNOSIS — Z09 ENCOUNTER FOR FOLLOW-UP EXAMINATION AFTER COMPLETED TREATMENT FOR CONDITIONS OTHER THAN MALIGNANT NEOPLASM: ICD-10-CM

## 2018-06-04 PROCEDURE — 93978 VASCULAR STUDY: CPT

## 2018-06-04 PROCEDURE — 93931 UPPER EXTREMITY STUDY: CPT

## 2018-06-04 PROCEDURE — 99214 OFFICE O/P EST MOD 30 MIN: CPT

## 2018-06-04 RX ORDER — ACLIDINIUM BROMIDE 400 UG/1
400 POWDER, METERED RESPIRATORY (INHALATION)
Qty: 1 | Refills: 0 | Status: DISCONTINUED | COMMUNITY
Start: 2017-07-12 | End: 2018-06-04

## 2018-06-04 RX ORDER — VALSARTAN 40 MG/1
TABLET, COATED ORAL
Refills: 0 | Status: COMPLETED | COMMUNITY
End: 2018-06-04

## 2018-06-04 RX ORDER — PREDNISONE 20 MG/1
20 TABLET ORAL
Qty: 4 | Refills: 0 | Status: DISCONTINUED | COMMUNITY
Start: 2017-07-10 | End: 2018-06-04

## 2018-06-11 ENCOUNTER — INPATIENT (INPATIENT)
Facility: HOSPITAL | Age: 78
LOS: 2 days | Discharge: HOME HEALTH | DRG: 871 | End: 2018-06-14
Attending: INTERNAL MEDICINE | Admitting: INTERNAL MEDICINE
Payer: COMMERCIAL

## 2018-06-11 ENCOUNTER — APPOINTMENT (OUTPATIENT)
Dept: VASCULAR SURGERY | Facility: CLINIC | Age: 78
End: 2018-06-11
Payer: MEDICARE

## 2018-06-11 VITALS
DIASTOLIC BLOOD PRESSURE: 62 MMHG | OXYGEN SATURATION: 95 % | HEART RATE: 124 BPM | RESPIRATION RATE: 18 BRPM | SYSTOLIC BLOOD PRESSURE: 98 MMHG

## 2018-06-11 DIAGNOSIS — E11.9 TYPE 2 DIABETES MELLITUS WITHOUT COMPLICATIONS: ICD-10-CM

## 2018-06-11 DIAGNOSIS — I25.10 ATHEROSCLEROTIC HEART DISEASE OF NATIVE CORONARY ARTERY WITHOUT ANGINA PECTORIS: ICD-10-CM

## 2018-06-11 DIAGNOSIS — J44.9 CHRONIC OBSTRUCTIVE PULMONARY DISEASE, UNSPECIFIED: ICD-10-CM

## 2018-06-11 DIAGNOSIS — I10 ESSENTIAL (PRIMARY) HYPERTENSION: ICD-10-CM

## 2018-06-11 DIAGNOSIS — A41.9 SEPSIS, UNSPECIFIED ORGANISM: ICD-10-CM

## 2018-06-11 DIAGNOSIS — I73.9 PERIPHERAL VASCULAR DISEASE, UNSPECIFIED: ICD-10-CM

## 2018-06-11 DIAGNOSIS — N17.9 ACUTE KIDNEY FAILURE, UNSPECIFIED: ICD-10-CM

## 2018-06-11 DIAGNOSIS — J18.1 LOBAR PNEUMONIA, UNSPECIFIED ORGANISM: ICD-10-CM

## 2018-06-11 LAB
ALBUMIN SERPL ELPH-MCNC: 3.7 G/DL — SIGNIFICANT CHANGE UP (ref 3.3–5)
ALP SERPL-CCNC: 101 U/L — SIGNIFICANT CHANGE UP (ref 40–120)
ALT FLD-CCNC: 15 U/L — SIGNIFICANT CHANGE UP (ref 10–45)
ANION GAP SERPL CALC-SCNC: 14 MMOL/L — SIGNIFICANT CHANGE UP (ref 5–17)
APTT BLD: 27.5 SEC — SIGNIFICANT CHANGE UP (ref 27.5–37.4)
AST SERPL-CCNC: 36 U/L — SIGNIFICANT CHANGE UP (ref 10–40)
BASE EXCESS BLDV CALC-SCNC: 2.4 MMOL/L — HIGH (ref -2–2)
BASOPHILS # BLD AUTO: 0 K/UL — SIGNIFICANT CHANGE UP (ref 0–0.2)
BASOPHILS NFR BLD AUTO: 0.3 % — SIGNIFICANT CHANGE UP (ref 0–2)
BILIRUB SERPL-MCNC: 0.6 MG/DL — SIGNIFICANT CHANGE UP (ref 0.2–1.2)
BUN SERPL-MCNC: 28 MG/DL — HIGH (ref 7–23)
CA-I SERPL-SCNC: 1.25 MMOL/L — SIGNIFICANT CHANGE UP (ref 1.12–1.3)
CALCIUM SERPL-MCNC: 10.4 MG/DL — SIGNIFICANT CHANGE UP (ref 8.4–10.5)
CHLORIDE BLDV-SCNC: 99 MMOL/L — SIGNIFICANT CHANGE UP (ref 96–108)
CHLORIDE SERPL-SCNC: 96 MMOL/L — SIGNIFICANT CHANGE UP (ref 96–108)
CK MB CFR SERPL CALC: 3 NG/ML — SIGNIFICANT CHANGE UP (ref 0–6.7)
CK MB CFR SERPL CALC: 3.4 NG/ML — SIGNIFICANT CHANGE UP (ref 0–6.7)
CK SERPL-CCNC: 48 U/L — SIGNIFICANT CHANGE UP (ref 30–200)
CK SERPL-CCNC: 82 U/L — SIGNIFICANT CHANGE UP (ref 30–200)
CO2 BLDV-SCNC: 30 MMOL/L — SIGNIFICANT CHANGE UP (ref 22–30)
CO2 SERPL-SCNC: 26 MMOL/L — SIGNIFICANT CHANGE UP (ref 22–31)
CREAT SERPL-MCNC: 1.81 MG/DL — HIGH (ref 0.5–1.3)
EOSINOPHIL # BLD AUTO: 0.1 K/UL — SIGNIFICANT CHANGE UP (ref 0–0.5)
EOSINOPHIL NFR BLD AUTO: 0.3 % — SIGNIFICANT CHANGE UP (ref 0–6)
GAS PNL BLDV: 134 MMOL/L — LOW (ref 136–145)
GAS PNL BLDV: SIGNIFICANT CHANGE UP
GAS PNL BLDV: SIGNIFICANT CHANGE UP
GLUCOSE BLDC GLUCOMTR-MCNC: 371 MG/DL — HIGH (ref 70–99)
GLUCOSE BLDV-MCNC: 298 MG/DL — HIGH (ref 70–99)
GLUCOSE SERPL-MCNC: 195 MG/DL — HIGH (ref 70–99)
HCO3 BLDV-SCNC: 28 MMOL/L — SIGNIFICANT CHANGE UP (ref 21–29)
HCT VFR BLD CALC: 41.7 % — SIGNIFICANT CHANGE UP (ref 39–50)
HCT VFR BLDA CALC: 37 % — LOW (ref 39–50)
HGB BLD CALC-MCNC: 12.1 G/DL — LOW (ref 13–17)
HGB BLD-MCNC: 13.6 G/DL — SIGNIFICANT CHANGE UP (ref 13–17)
INR BLD: 1.14 RATIO — SIGNIFICANT CHANGE UP (ref 0.88–1.16)
LACTATE BLDV-MCNC: 2 MMOL/L — SIGNIFICANT CHANGE UP (ref 0.7–2)
LIDOCAIN IGE QN: 18 U/L — SIGNIFICANT CHANGE UP (ref 7–60)
LYMPHOCYTES # BLD AUTO: 1 K/UL — SIGNIFICANT CHANGE UP (ref 1–3.3)
LYMPHOCYTES # BLD AUTO: 5.7 % — LOW (ref 13–44)
MCHC RBC-ENTMCNC: 29 PG — SIGNIFICANT CHANGE UP (ref 27–34)
MCHC RBC-ENTMCNC: 32.5 GM/DL — SIGNIFICANT CHANGE UP (ref 32–36)
MCV RBC AUTO: 89 FL — SIGNIFICANT CHANGE UP (ref 80–100)
MONOCYTES # BLD AUTO: 1.8 K/UL — HIGH (ref 0–0.9)
MONOCYTES NFR BLD AUTO: 10 % — SIGNIFICANT CHANGE UP (ref 2–14)
NEUTROPHILS # BLD AUTO: 14.9 K/UL — HIGH (ref 1.8–7.4)
NEUTROPHILS NFR BLD AUTO: 83.7 % — HIGH (ref 43–77)
PCO2 BLDV: 52 MMHG — HIGH (ref 35–50)
PH BLDV: 7.36 — SIGNIFICANT CHANGE UP (ref 7.35–7.45)
PLATELET # BLD AUTO: 236 K/UL — SIGNIFICANT CHANGE UP (ref 150–400)
PO2 BLDV: 52 MMHG — HIGH (ref 25–45)
POTASSIUM BLDV-SCNC: 4.5 MMOL/L — SIGNIFICANT CHANGE UP (ref 3.5–5.3)
POTASSIUM SERPL-MCNC: 4.9 MMOL/L — SIGNIFICANT CHANGE UP (ref 3.5–5.3)
POTASSIUM SERPL-SCNC: 4.9 MMOL/L — SIGNIFICANT CHANGE UP (ref 3.5–5.3)
PROT SERPL-MCNC: 7.8 G/DL — SIGNIFICANT CHANGE UP (ref 6–8.3)
PROTHROM AB SERPL-ACNC: 12.4 SEC — SIGNIFICANT CHANGE UP (ref 9.8–12.7)
RAPID RVP RESULT: SIGNIFICANT CHANGE UP
RBC # BLD: 4.68 M/UL — SIGNIFICANT CHANGE UP (ref 4.2–5.8)
RBC # FLD: 12.3 % — SIGNIFICANT CHANGE UP (ref 10.3–14.5)
SAO2 % BLDV: 84 % — SIGNIFICANT CHANGE UP (ref 67–88)
SODIUM SERPL-SCNC: 136 MMOL/L — SIGNIFICANT CHANGE UP (ref 135–145)
TROPONIN T SERPL-MCNC: 0.02 NG/ML — SIGNIFICANT CHANGE UP (ref 0–0.06)
TROPONIN T SERPL-MCNC: 0.02 NG/ML — SIGNIFICANT CHANGE UP (ref 0–0.06)
TROPONIN T SERPL-MCNC: 0.03 NG/ML — SIGNIFICANT CHANGE UP (ref 0–0.06)
WBC # BLD: 17.8 K/UL — HIGH (ref 3.8–10.5)
WBC # FLD AUTO: 17.8 K/UL — HIGH (ref 3.8–10.5)

## 2018-06-11 PROCEDURE — 71250 CT THORAX DX C-: CPT | Mod: 26

## 2018-06-11 PROCEDURE — 99213 OFFICE O/P EST LOW 20 MIN: CPT

## 2018-06-11 PROCEDURE — 93923 UPR/LXTR ART STDY 3+ LVLS: CPT

## 2018-06-11 PROCEDURE — 71046 X-RAY EXAM CHEST 2 VIEWS: CPT | Mod: 26

## 2018-06-11 PROCEDURE — 99285 EMERGENCY DEPT VISIT HI MDM: CPT | Mod: 25

## 2018-06-11 PROCEDURE — 93010 ELECTROCARDIOGRAM REPORT: CPT

## 2018-06-11 RX ORDER — CEFTRIAXONE 500 MG/1
1 INJECTION, POWDER, FOR SOLUTION INTRAMUSCULAR; INTRAVENOUS EVERY 24 HOURS
Qty: 0 | Refills: 0 | Status: DISCONTINUED | OUTPATIENT
Start: 2018-06-11 | End: 2018-06-13

## 2018-06-11 RX ORDER — SODIUM CHLORIDE 9 MG/ML
1500 INJECTION INTRAMUSCULAR; INTRAVENOUS; SUBCUTANEOUS ONCE
Qty: 0 | Refills: 0 | Status: COMPLETED | OUTPATIENT
Start: 2018-06-11 | End: 2018-06-11

## 2018-06-11 RX ORDER — SODIUM CHLORIDE 9 MG/ML
1000 INJECTION, SOLUTION INTRAVENOUS
Qty: 0 | Refills: 0 | Status: DISCONTINUED | OUTPATIENT
Start: 2018-06-11 | End: 2018-06-14

## 2018-06-11 RX ORDER — IPRATROPIUM/ALBUTEROL SULFATE 18-103MCG
3 AEROSOL WITH ADAPTER (GRAM) INHALATION ONCE
Qty: 0 | Refills: 0 | Status: COMPLETED | OUTPATIENT
Start: 2018-06-11 | End: 2018-06-11

## 2018-06-11 RX ORDER — AZITHROMYCIN 500 MG/1
500 TABLET, FILM COATED ORAL DAILY
Qty: 0 | Refills: 0 | Status: DISCONTINUED | OUTPATIENT
Start: 2018-06-12 | End: 2018-06-14

## 2018-06-11 RX ORDER — GLUCAGON INJECTION, SOLUTION 0.5 MG/.1ML
1 INJECTION, SOLUTION SUBCUTANEOUS ONCE
Qty: 0 | Refills: 0 | Status: DISCONTINUED | OUTPATIENT
Start: 2018-06-11 | End: 2018-06-14

## 2018-06-11 RX ORDER — DEXTROSE 50 % IN WATER 50 %
12.5 SYRINGE (ML) INTRAVENOUS ONCE
Qty: 0 | Refills: 0 | Status: DISCONTINUED | OUTPATIENT
Start: 2018-06-11 | End: 2018-06-14

## 2018-06-11 RX ORDER — ATORVASTATIN CALCIUM 80 MG/1
40 TABLET, FILM COATED ORAL AT BEDTIME
Qty: 0 | Refills: 0 | Status: DISCONTINUED | OUTPATIENT
Start: 2018-06-11 | End: 2018-06-14

## 2018-06-11 RX ORDER — DEXTROSE 50 % IN WATER 50 %
15 SYRINGE (ML) INTRAVENOUS ONCE
Qty: 0 | Refills: 0 | Status: DISCONTINUED | OUTPATIENT
Start: 2018-06-11 | End: 2018-06-14

## 2018-06-11 RX ORDER — HEPARIN SODIUM 5000 [USP'U]/ML
5000 INJECTION INTRAVENOUS; SUBCUTANEOUS EVERY 12 HOURS
Qty: 0 | Refills: 0 | Status: DISCONTINUED | OUTPATIENT
Start: 2018-06-11 | End: 2018-06-14

## 2018-06-11 RX ORDER — TAMSULOSIN HYDROCHLORIDE 0.4 MG/1
0.4 CAPSULE ORAL AT BEDTIME
Qty: 0 | Refills: 0 | Status: DISCONTINUED | OUTPATIENT
Start: 2018-06-11 | End: 2018-06-14

## 2018-06-11 RX ORDER — BUDESONIDE AND FORMOTEROL FUMARATE DIHYDRATE 160; 4.5 UG/1; UG/1
2 AEROSOL RESPIRATORY (INHALATION)
Qty: 0 | Refills: 0 | Status: DISCONTINUED | OUTPATIENT
Start: 2018-06-11 | End: 2018-06-14

## 2018-06-11 RX ORDER — CEFTRIAXONE 500 MG/1
1 INJECTION, POWDER, FOR SOLUTION INTRAMUSCULAR; INTRAVENOUS ONCE
Qty: 0 | Refills: 0 | Status: DISCONTINUED | OUTPATIENT
Start: 2018-06-11 | End: 2018-06-11

## 2018-06-11 RX ORDER — DEXTROSE 50 % IN WATER 50 %
25 SYRINGE (ML) INTRAVENOUS ONCE
Qty: 0 | Refills: 0 | Status: DISCONTINUED | OUTPATIENT
Start: 2018-06-11 | End: 2018-06-14

## 2018-06-11 RX ORDER — CLOPIDOGREL BISULFATE 75 MG/1
75 TABLET, FILM COATED ORAL DAILY
Qty: 0 | Refills: 0 | Status: DISCONTINUED | OUTPATIENT
Start: 2018-06-11 | End: 2018-06-14

## 2018-06-11 RX ORDER — INSULIN LISPRO 100/ML
VIAL (ML) SUBCUTANEOUS AT BEDTIME
Qty: 0 | Refills: 0 | Status: DISCONTINUED | OUTPATIENT
Start: 2018-06-11 | End: 2018-06-14

## 2018-06-11 RX ORDER — SODIUM CHLORIDE 9 MG/ML
1000 INJECTION INTRAMUSCULAR; INTRAVENOUS; SUBCUTANEOUS ONCE
Qty: 0 | Refills: 0 | Status: COMPLETED | OUTPATIENT
Start: 2018-06-11 | End: 2018-06-11

## 2018-06-11 RX ORDER — ASPIRIN/CALCIUM CARB/MAGNESIUM 324 MG
162 TABLET ORAL ONCE
Qty: 0 | Refills: 0 | Status: COMPLETED | OUTPATIENT
Start: 2018-06-11 | End: 2018-06-11

## 2018-06-11 RX ORDER — INSULIN LISPRO 100/ML
VIAL (ML) SUBCUTANEOUS
Qty: 0 | Refills: 0 | Status: DISCONTINUED | OUTPATIENT
Start: 2018-06-11 | End: 2018-06-14

## 2018-06-11 RX ORDER — CEFTRIAXONE 500 MG/1
1 INJECTION, POWDER, FOR SOLUTION INTRAMUSCULAR; INTRAVENOUS ONCE
Qty: 0 | Refills: 0 | Status: COMPLETED | OUTPATIENT
Start: 2018-06-11 | End: 2018-06-11

## 2018-06-11 RX ORDER — AZITHROMYCIN 500 MG/1
500 TABLET, FILM COATED ORAL ONCE
Qty: 0 | Refills: 0 | Status: COMPLETED | OUTPATIENT
Start: 2018-06-11 | End: 2018-06-11

## 2018-06-11 RX ORDER — ASPIRIN/CALCIUM CARB/MAGNESIUM 324 MG
81 TABLET ORAL DAILY
Qty: 0 | Refills: 0 | Status: DISCONTINUED | OUTPATIENT
Start: 2018-06-11 | End: 2018-06-14

## 2018-06-11 RX ORDER — MULTIVIT-MIN/FERROUS GLUCONATE 9 MG/15 ML
1 LIQUID (ML) ORAL DAILY
Qty: 0 | Refills: 0 | Status: DISCONTINUED | OUTPATIENT
Start: 2018-06-11 | End: 2018-06-14

## 2018-06-11 RX ORDER — AZITHROMYCIN 500 MG/1
500 TABLET, FILM COATED ORAL ONCE
Qty: 0 | Refills: 0 | Status: DISCONTINUED | OUTPATIENT
Start: 2018-06-11 | End: 2018-06-11

## 2018-06-11 RX ADMIN — BUDESONIDE AND FORMOTEROL FUMARATE DIHYDRATE 2 PUFF(S): 160; 4.5 AEROSOL RESPIRATORY (INHALATION) at 21:49

## 2018-06-11 RX ADMIN — CLOPIDOGREL BISULFATE 75 MILLIGRAM(S): 75 TABLET, FILM COATED ORAL at 21:49

## 2018-06-11 RX ADMIN — HEPARIN SODIUM 5000 UNIT(S): 5000 INJECTION INTRAVENOUS; SUBCUTANEOUS at 21:49

## 2018-06-11 RX ADMIN — Medication 3: at 22:16

## 2018-06-11 RX ADMIN — Medication 162 MILLIGRAM(S): at 14:59

## 2018-06-11 RX ADMIN — SODIUM CHLORIDE 1500 MILLILITER(S): 9 INJECTION INTRAMUSCULAR; INTRAVENOUS; SUBCUTANEOUS at 15:00

## 2018-06-11 RX ADMIN — SODIUM CHLORIDE 1000 MILLILITER(S): 9 INJECTION INTRAMUSCULAR; INTRAVENOUS; SUBCUTANEOUS at 15:00

## 2018-06-11 RX ADMIN — Medication 125 MILLIGRAM(S): at 14:59

## 2018-06-11 RX ADMIN — ATORVASTATIN CALCIUM 40 MILLIGRAM(S): 80 TABLET, FILM COATED ORAL at 21:50

## 2018-06-11 RX ADMIN — CEFTRIAXONE 100 GRAM(S): 500 INJECTION, POWDER, FOR SOLUTION INTRAMUSCULAR; INTRAVENOUS at 15:09

## 2018-06-11 RX ADMIN — AZITHROMYCIN 250 MILLIGRAM(S): 500 TABLET, FILM COATED ORAL at 16:30

## 2018-06-11 RX ADMIN — Medication 3 MILLILITER(S): at 14:59

## 2018-06-11 RX ADMIN — TAMSULOSIN HYDROCHLORIDE 0.4 MILLIGRAM(S): 0.4 CAPSULE ORAL at 21:50

## 2018-06-11 NOTE — ED PROVIDER NOTE - PHYSICAL EXAMINATION
Gen: NAD, AOx3, non-toxic //            Head: NCAT //            HEENT: EOMI, oral mucosa moist, normal conjunctiva //            Lung: CTAB, no respiratory distress, no wheezes/rhonchi/rales B/L, speaking in full sentences. //       CARDIAC: Regular rate and rhythm. Normal S1 and S2. No murmurs, rubs or gallops.  Equal pulses in upper and lower extremities bilaterally. No JVD.  There is no peripheral edema, cyanosis or pallor. Extremities are warm and well perfused. Capillary refill is less than 2 seconds. No carotid bruits.  //            Abd: soft, NTND, no guarding, no CVA tenderness //            MSK: no visible deformities //            Neuro: No focal sensory or motor deficits //            Skin: Warm, well perfused, no rash //            Psych: normal affect. ~Jenniffer Chin M.D., Ph.D. -Resident

## 2018-06-11 NOTE — H&P ADULT - PROBLEM SELECTOR PLAN 1
elevated HR, lactic acidosis, leukocytosis, likely source clinically pneumonia, MERRILL  s/p IV fluids with improved BP  urine culture blood cultures pending   s/p ceftriaxone IV and azithromycin IV  pulmonary evaluation and ID evaluation in AM  clinically improving already

## 2018-06-11 NOTE — H&P ADULT - HISTORY OF PRESENT ILLNESS
78 male history of COPD on 2LNC at home, CAD, DM 2, HTN, HLD, PVD started feeling unwell 2 days ago. His wife attributed it to COPD as at night patient had found that his O2 had slipped out of his nose. She took him to the vascular attending office where they found he was tachycardic and hypotensive and sent him to the Emergency Department. He later developed substernal, pressure-like, non-radiating, improved with O2 on.   BP was 98/60 and HR 130s so sent to Emergency Department. Seen by outpatient cardiology attending who diagnosed sinus tach. No lightheadedness or chest pain or shortness of breath. Currently subjectively feels much improved.

## 2018-06-11 NOTE — ED PROVIDER NOTE - MEDICAL DECISION MAKING DETAILS
PE vs ACS vs COPD exacerbation, will do basic labs, fluids, CTA chest, cardiac enzymes, ekg, cxr, likely admit.

## 2018-06-11 NOTE — ED PROVIDER NOTE - OBJECTIVE STATEMENT
78 male history of COPD on 2LNC at home, CAD, DMII, HTN, HLD, PVD here for chest pain. Onset last night when O2 fell off, mild intensituy, pointws substernal, pressure-like, nonradadiating, improved with O2 on.  This AM was at Vascular sx office ([facility]) who noted tachycardia and low BP to 98/60 and HR 130s so sent here. Sinus tach. No lightheadedness or chest pain or shortness of breath. 78 male history of COPD on 2LNC at home, CAD, DMII, HTN, HLD, PVD here for chest pain. Onset last night when O2 fell off, mild intensity, points to substernal region, pressure-like, nonradadiating, improved with O2 on.  This AM was at Vascular sx office ([facility]) who noted tachycardia and low BP to 98/60 and HR 130s so sent here. Sinus tach. No lightheadedness or chest pain or shortness of breath.

## 2018-06-11 NOTE — ED ADULT NURSE REASSESSMENT NOTE - COMFORT CARE
meal provided
plan of care explained/side rails up/warm blanket provided/treatment delay explained/wait time explained/darkened lights

## 2018-06-11 NOTE — ED PROVIDER NOTE - NS ED ROS FT
GENERAL: No fever or chills, //             EYES: no change in vision, //             HEENT: no trouble swallowing or speaking, //                PULMONARY: no cough or SOB, //             GI: no abdominal pain, no nausea or no vomiting, no diarrhea or constipation, //             : No changes in urination,  //            SKIN: no rashes,  //            NEURO: no headache,  //             MSK: No joint pain otherwise as HPI or negative. ~Jenniffer Chin M.D., Ph.D. -Resident

## 2018-06-11 NOTE — ED ADULT NURSE REASSESSMENT NOTE - NS ED NURSE REASSESS COMMENT FT1
Report received from     RACHEL Brar  Pt resting comfortably  Awaiting bed, labs drawn and sent by previous RN  Safety maintained at all times, bed in lowest position, call bell in hand.  Will continue to monitor closely.

## 2018-06-11 NOTE — CHART NOTE - NSCHARTNOTEFT_GEN_A_CORE
Pt was seen and examined.  Briefly this is a elderly male c hx PVD DM CAD pw hypotension and tachycardia  MERRILL likely hemodynamic in nature    Suggest  -Cont NS at 100cc/hr  -No renal objection to CTA if required  -Check UA and quantify urine  -IV abx        Sayed Angel  [facility]  [phone]

## 2018-06-11 NOTE — ED PROVIDER NOTE - ATTENDING CONTRIBUTION TO CARE
I have examined and evaluated this patient with the above resident or PA, and agree with the documented clinical history, exam and plan.   Briefly: 77y/o M w/ h/o CAD COPD (on home O2 2L), DM, HLD, HTN, PVD, presentign with chest pain, SOB, cough; found to be tachycardic in vascular surgeons office (there for follow-up visit) and sent to ED; where patient subsequently found to be febrile.    On exam, febrile, tachycardic, but not in acute distress.  MMM.  Speaking clearly and in full sentences, cooperative with exam.  Lungs with few rales L>R otherwise lungs clear no wheezing.  Cardiac s1s2, abd soft nt/nd, no cva tenderness, no rash, no peripheral edema.    Given pulmonary exam, and presenting complaints, likely respiratory infection, pneumonia suspected, vs URI/viral syndrome.  Tachycardia and fever meet sepsis criteria: blood cultures sent, fluid boluses initiated and patient empirically start on antibiotics for community acquired pneumonia coverage.  Plan for admission to medicine service for further evaluation and treatment.

## 2018-06-11 NOTE — ED PROVIDER NOTE - PROGRESS NOTE DETAILS
patient found to have rectal temp. Will cancel CTA chest bc PNA much more likely cause of chest pain and tachycardia vs PE. Will reconsider CTA if tachycardia not responsive to fluid bolus Emir Chin MD,PhD - Resident: patient accepted by Dr. Tineo for admission

## 2018-06-11 NOTE — H&P ADULT - PROBLEM SELECTOR PLAN 4
likely secondary to ATN from sepsis and hypotension  s.p IVF  will improve with hydration  renal evaluation called

## 2018-06-11 NOTE — H&P ADULT - ASSESSMENT
78 male history of COPD on 2LNC at home, CAD, DM 2, HTN, HLD, PVD started feeling unwell 2 days ago.

## 2018-06-11 NOTE — ED ADULT NURSE NOTE - OBJECTIVE STATEMENT
Patient is alert  and  oriented  x3.  Color is  good   and  skin warm  to touch.   Sob, wheezing  and  moist  non-productive   cough noted.   Cm  is  reading  sinus  tachycardia.  Patient  is febrile.

## 2018-06-11 NOTE — H&P ADULT - NSHPPHYSICALEXAM_GEN_ALL_CORE
PHYSICAL EXAM: vital signs as above  in no apparent distress  HEENT: VIOLETA EOMI  Neck: Supple, no JVD, no thyromegaly  Lungs: scattered expiratory wheeze, no crackles  CVS: S1 S2 soft ejection systolic murmur best heard at left sternal border   Abdomen: no tenderness, no organomegaly, BS present  Neuro: AO x 3 no focal weakness, no sensory abnormalities  Psych: appropriate affect  Skin: warm, dry  Ext: no cyanosis or clubbing, no edema  Msk: no joint swelling or deformities  Back: no CVA tenderness, no kyphosis/scoliosis

## 2018-06-11 NOTE — CONSULT NOTE ADULT - SUBJECTIVE AND OBJECTIVE BOX
CARDIOLOGY ATTENDING    History: He is a pleasant 79 y/o male PMH CAD s/p CABG (2004) and DM admitted with tachycardia (sinus) in setting of fever. A fever workup is pending. He denies palpitations nor syncope, but has been reporting atypical chest pain. Echo/NST in July 2017 was unremarkable    PMH: as above. CAD, DM, COPD, PVD  PSHX: Three-vessel CABG in 2004.  Fam HX: Noncontributory for cardiovascular disease.  Allergies: NKDA.  Medications: Valsartan/hydrochlorothiazide 160/25 mg daily, clopidogrel 75 mg daily, metformin 1000 mg twice daily, tamsulosin, atorvastatin 40 mg QHS, Symbicort, Avair  Review of systems: No fevers. No chills. No rash. No bleed. No diarrhea. No dysuria.     azithromycin  IVPB 500 milliGRAM(s) IV Intermittent Once                            13.6   17.8  )-----------( 236      ( 11 Jun 2018 14:42 )             41.7       06-11    136  |  96  |  28<H>  ----------------------------<  195<H>  4.9   |  26  |  1.81<H>    Ca    10.4      11 Jun 2018 14:42    TPro  7.8  /  Alb  3.7  /  TBili  0.6  /  DBili  x   /  AST  36  /  ALT  15  /  AlkPhos  101  06-11      CARDIAC MARKERS ( 11 Jun 2018 14:42 )  x     / 0.03 ng/mL / 82 U/L / x     / x            T(C): 38 (06-11-18 @ 14:38), Max: 38 (06-11-18 @ 14:38)  HR: 124 (06-11-18 @ 14:04) (124 - 124)  BP: 98/62 (06-11-18 @ 14:04) (98/62 - 98/62)  RR: 18 (06-11-18 @ 14:04) (18 - 18)  SpO2: 95% (06-11-18 @ 14:04) (95% - 95%)  Wt(kg): --    no JVD  RRR, no murmurs  CTAB  soft nt/nd  no c/c/e      A/P) He is a pleasant 79 y/o male PMH CAD s/p CABG (2004) and DM admitted with tachycardia (sinus) in setting of fever. A fever workup is pending. He denies palpitations nor syncope, but has been reporting atypical chest pain. Echo/NST in July 2017 was unremarkable    -continue current medical therapy for stable CAD  -fever workup as per primary team  -given atypical chest pain would get 3 set of CPK/troponin and admit to telemetry  -get repeat echo to assure normal LVEF  -final cardiology reccs pending the above

## 2018-06-12 DIAGNOSIS — J18.9 PNEUMONIA, UNSPECIFIED ORGANISM: ICD-10-CM

## 2018-06-12 DIAGNOSIS — J18.1 LOBAR PNEUMONIA, UNSPECIFIED ORGANISM: ICD-10-CM

## 2018-06-12 DIAGNOSIS — R50.9 FEVER, UNSPECIFIED: ICD-10-CM

## 2018-06-12 DIAGNOSIS — D72.829 ELEVATED WHITE BLOOD CELL COUNT, UNSPECIFIED: ICD-10-CM

## 2018-06-12 DIAGNOSIS — R59.1 GENERALIZED ENLARGED LYMPH NODES: ICD-10-CM

## 2018-06-12 DIAGNOSIS — E11.9 TYPE 2 DIABETES MELLITUS WITHOUT COMPLICATIONS: ICD-10-CM

## 2018-06-12 LAB
ANION GAP SERPL CALC-SCNC: 13 MMOL/L — SIGNIFICANT CHANGE UP (ref 5–17)
APPEARANCE UR: CLEAR — SIGNIFICANT CHANGE UP
BILIRUB UR-MCNC: NEGATIVE — SIGNIFICANT CHANGE UP
BUN SERPL-MCNC: 33 MG/DL — HIGH (ref 7–23)
CALCIUM SERPL-MCNC: 9.4 MG/DL — SIGNIFICANT CHANGE UP (ref 8.4–10.5)
CHLORIDE SERPL-SCNC: 97 MMOL/L — SIGNIFICANT CHANGE UP (ref 96–108)
CO2 SERPL-SCNC: 28 MMOL/L — SIGNIFICANT CHANGE UP (ref 22–31)
COLOR SPEC: SIGNIFICANT CHANGE UP
CREAT SERPL-MCNC: 1.54 MG/DL — HIGH (ref 0.5–1.3)
DIFF PNL FLD: NEGATIVE — SIGNIFICANT CHANGE UP
GLUCOSE BLDC GLUCOMTR-MCNC: 195 MG/DL — HIGH (ref 70–99)
GLUCOSE BLDC GLUCOMTR-MCNC: 214 MG/DL — HIGH (ref 70–99)
GLUCOSE BLDC GLUCOMTR-MCNC: 269 MG/DL — HIGH (ref 70–99)
GLUCOSE BLDC GLUCOMTR-MCNC: 331 MG/DL — HIGH (ref 70–99)
GLUCOSE SERPL-MCNC: 366 MG/DL — HIGH (ref 70–99)
GLUCOSE UR QL: >1000 MG/DL
HBA1C BLD-MCNC: 6.2 % — HIGH (ref 4–5.6)
HCT VFR BLD CALC: 37.4 % — LOW (ref 39–50)
HGB BLD-MCNC: 11.3 G/DL — LOW (ref 13–17)
KETONES UR-MCNC: NEGATIVE — SIGNIFICANT CHANGE UP
LEUKOCYTE ESTERASE UR-ACNC: NEGATIVE — SIGNIFICANT CHANGE UP
MCHC RBC-ENTMCNC: 26.6 PG — LOW (ref 27–34)
MCHC RBC-ENTMCNC: 30.2 GM/DL — LOW (ref 32–36)
MCV RBC AUTO: 88 FL — SIGNIFICANT CHANGE UP (ref 80–100)
NITRITE UR-MCNC: NEGATIVE — SIGNIFICANT CHANGE UP
PH UR: 5.5 — SIGNIFICANT CHANGE UP (ref 5–8)
PLATELET # BLD AUTO: 267 K/UL — SIGNIFICANT CHANGE UP (ref 150–400)
POTASSIUM SERPL-MCNC: 4.8 MMOL/L — SIGNIFICANT CHANGE UP (ref 3.5–5.3)
POTASSIUM SERPL-SCNC: 4.8 MMOL/L — SIGNIFICANT CHANGE UP (ref 3.5–5.3)
PROT UR-MCNC: NEGATIVE — SIGNIFICANT CHANGE UP
RBC # BLD: 4.25 M/UL — SIGNIFICANT CHANGE UP (ref 4.2–5.8)
RBC # FLD: 13.6 % — SIGNIFICANT CHANGE UP (ref 10.3–14.5)
SODIUM SERPL-SCNC: 138 MMOL/L — SIGNIFICANT CHANGE UP (ref 135–145)
SP GR SPEC: 1.02 — SIGNIFICANT CHANGE UP (ref 1.01–1.02)
TROPONIN T SERPL-MCNC: 0.02 NG/ML — SIGNIFICANT CHANGE UP (ref 0–0.06)
TSH SERPL-MCNC: 0.43 UIU/ML — SIGNIFICANT CHANGE UP (ref 0.27–4.2)
UROBILINOGEN FLD QL: NEGATIVE — SIGNIFICANT CHANGE UP
WBC # BLD: 10.14 K/UL — SIGNIFICANT CHANGE UP (ref 3.8–10.5)
WBC # FLD AUTO: 10.14 K/UL — SIGNIFICANT CHANGE UP (ref 3.8–10.5)

## 2018-06-12 PROCEDURE — 99222 1ST HOSP IP/OBS MODERATE 55: CPT

## 2018-06-12 RX ORDER — INSULIN LISPRO 100/ML
6 VIAL (ML) SUBCUTANEOUS
Qty: 0 | Refills: 0 | Status: DISCONTINUED | OUTPATIENT
Start: 2018-06-12 | End: 2018-06-12

## 2018-06-12 RX ORDER — INSULIN LISPRO 100 [IU]/ML
10 INJECTION, SUSPENSION SUBCUTANEOUS
Qty: 0 | Refills: 0 | Status: DISCONTINUED | OUTPATIENT
Start: 2018-06-12 | End: 2018-06-12

## 2018-06-12 RX ORDER — INSULIN GLARGINE 100 [IU]/ML
16 INJECTION, SOLUTION SUBCUTANEOUS AT BEDTIME
Qty: 0 | Refills: 0 | Status: DISCONTINUED | OUTPATIENT
Start: 2018-06-12 | End: 2018-06-14

## 2018-06-12 RX ORDER — INSULIN LISPRO 100/ML
7 VIAL (ML) SUBCUTANEOUS
Qty: 0 | Refills: 0 | Status: DISCONTINUED | OUTPATIENT
Start: 2018-06-12 | End: 2018-06-14

## 2018-06-12 RX ORDER — LABETALOL HCL 100 MG
10 TABLET ORAL ONCE
Qty: 0 | Refills: 0 | Status: DISCONTINUED | OUTPATIENT
Start: 2018-06-12 | End: 2018-06-12

## 2018-06-12 RX ADMIN — Medication 1: at 17:12

## 2018-06-12 RX ADMIN — Medication 81 MILLIGRAM(S): at 13:14

## 2018-06-12 RX ADMIN — HEPARIN SODIUM 5000 UNIT(S): 5000 INJECTION INTRAVENOUS; SUBCUTANEOUS at 10:09

## 2018-06-12 RX ADMIN — Medication 4: at 12:46

## 2018-06-12 RX ADMIN — INSULIN GLARGINE 16 UNIT(S): 100 INJECTION, SOLUTION SUBCUTANEOUS at 23:04

## 2018-06-12 RX ADMIN — ATORVASTATIN CALCIUM 40 MILLIGRAM(S): 80 TABLET, FILM COATED ORAL at 23:10

## 2018-06-12 RX ADMIN — BUDESONIDE AND FORMOTEROL FUMARATE DIHYDRATE 2 PUFF(S): 160; 4.5 AEROSOL RESPIRATORY (INHALATION) at 17:13

## 2018-06-12 RX ADMIN — TAMSULOSIN HYDROCHLORIDE 0.4 MILLIGRAM(S): 0.4 CAPSULE ORAL at 23:10

## 2018-06-12 RX ADMIN — AZITHROMYCIN 500 MILLIGRAM(S): 500 TABLET, FILM COATED ORAL at 13:14

## 2018-06-12 RX ADMIN — HEPARIN SODIUM 5000 UNIT(S): 5000 INJECTION INTRAVENOUS; SUBCUTANEOUS at 17:13

## 2018-06-12 RX ADMIN — CEFTRIAXONE 100 GRAM(S): 500 INJECTION, POWDER, FOR SOLUTION INTRAMUSCULAR; INTRAVENOUS at 17:12

## 2018-06-12 RX ADMIN — Medication 1 TABLET(S): at 17:14

## 2018-06-12 RX ADMIN — Medication 3: at 08:42

## 2018-06-12 RX ADMIN — BUDESONIDE AND FORMOTEROL FUMARATE DIHYDRATE 2 PUFF(S): 160; 4.5 AEROSOL RESPIRATORY (INHALATION) at 05:30

## 2018-06-12 RX ADMIN — CLOPIDOGREL BISULFATE 75 MILLIGRAM(S): 75 TABLET, FILM COATED ORAL at 13:13

## 2018-06-12 NOTE — CONSULT NOTE ADULT - SUBJECTIVE AND OBJECTIVE BOX
Patient is a 78y old  Male who presents with a chief complaint of SOB (2018 20:47)      HPI:  78 male history of COPD on 2LNC at home, CAD, DM 2, HTN, HLD, PVD started feeling unwell 2 days ago. His wife attributed it to COPD as at night patient had found that his O2 had slipped out of his nose. She took him to the vascular attending office where they found he was tachycardic and hypotensive and sent him to the Emergency Department. He later developed substernal, pressure-like, non-radiating, improved with O2 on.   BP was 98/60 and HR 130s so sent to Emergency Department. Seen by outpatient cardiology attending who diagnosed sinus tach. No lightheadedness or chest pain or shortness of breath. Currently subjectively feels much improved. (2018 17:30)    pt is a pretty poor historian: he says he has mild SOB with some cough: He has no chest pain now and he used to smoke and stopped about 30 years ago: He has been using oxygen at home: He was found to have Pneumonia on ct chest and hence admitted:       ?FOLLOWING PRESENT  [x ] Hx of PE/DVT, [y ] Hx COPD, [x ] Hx of Asthma, [ ?] Hx of Hospitalization, [x ]  Hx of BiPAP/CPAP use, [ unk] Hx of COURTNEY    Allergies    No Known Allergies    Intolerances    shellfish (Nausea)      PAST MEDICAL & SURGICAL HISTORY:  PVD (peripheral vascular disease)  Hypertension  COPD (chronic obstructive pulmonary disease)  Osteomyelitis  Dyslipidemia  CAD (Coronary Artery Disease)  Diabetes Mellitus, Type II  CABG (Coronary Artery Bypass Graft)      FAMILY HISTORY:  Family history of diabetes mellitus (Sibling)      Social History: [ remote: quit 30 years ago  ] TOBACCO                  [x  ] ETOH                                 [x  ] IVDA/DRUGS    REVIEW OF SYSTEMS      General:	x    Skin/Breast:x  	  Ophthalmologic:x  	  ENMT:	x    Respiratory and Thorax: sob, cough   	  Cardiovascular:	x    Gastrointestinal:	x    Genitourinary:	x    Musculoskeletal:	x    Neurological:	x    Psychiatric:	x    Hematology/Lymphatics:	x    Endocrine:	x    Allergic/Immunologic:	x    MEDICATIONS  (STANDING):  aspirin enteric coated 81 milliGRAM(s) Oral daily  atorvastatin 40 milliGRAM(s) Oral at bedtime  azithromycin   Tablet 500 milliGRAM(s) Oral daily  buDESOnide 160 MICROgram(s)/formoterol 4.5 MICROgram(s) Inhaler 2 Puff(s) Inhalation two times a day  cefTRIAXone   IVPB 1 Gram(s) IV Intermittent every 24 hours  clopidogrel Tablet 75 milliGRAM(s) Oral daily  dextrose 5%. 1000 milliLiter(s) (50 mL/Hr) IV Continuous <Continuous>  dextrose 5%. 1000 milliLiter(s) (50 mL/Hr) IV Continuous <Continuous>  dextrose 50% Injectable 12.5 Gram(s) IV Push once  dextrose 50% Injectable 25 Gram(s) IV Push once  dextrose 50% Injectable 25 Gram(s) IV Push once  dextrose 50% Injectable 12.5 Gram(s) IV Push once  dextrose 50% Injectable 25 Gram(s) IV Push once  dextrose 50% Injectable 25 Gram(s) IV Push once  heparin  Injectable 5000 Unit(s) SubCutaneous every 12 hours  insulin lispro (HumaLOG) corrective regimen sliding scale   SubCutaneous three times a day before meals  insulin lispro (HumaLOG) corrective regimen sliding scale   SubCutaneous at bedtime  multivitamin/minerals 1 Tablet(s) Oral daily  tamsulosin 0.4 milliGRAM(s) Oral at bedtime    MEDICATIONS  (PRN):  dextrose 40% Gel 15 Gram(s) Oral once PRN Blood Glucose LESS THAN 70 milliGRAM(s)/deciliter  dextrose 40% Gel 15 Gram(s) Oral once PRN Blood Glucose LESS THAN 70 milliGRAM(s)/deciliter  glucagon  Injectable 1 milliGRAM(s) IntraMuscular once PRN Glucose LESS THAN 70 milligrams/deciliter  glucagon  Injectable 1 milliGRAM(s) IntraMuscular once PRN Glucose LESS THAN 70 milligrams/deciliter       Vital Signs Last 24 Hrs  T(C): 36.4 (2018 07:15), Max: 38 (2018 14:20)  T(F): 97.6 (2018 07:15), Max: 100.4 (2018 14:20)  HR: 96 (2018 07:15) (87 - 124)  BP: 133/77 (2018 07:15) (98/62 - 133/77)  BP(mean): --  RR: 20 (2018 07:15) (18 - 23)  SpO2: 100% (2018 07:15) (95% - 100%)        I&O's Summary    2018 07:01  -  2018 07:00  --------------------------------------------------------  IN: 0 mL / OUT: 650 mL / NET: -650 mL        Physical Exam:   GENERAL: NAD, well-groomed, well-developed  HEENT: VIOLETA/   Atraumatic, Normocephalic  ENMT: No tonsillar erythema, exudates, or enlargement; Moist mucous membranes, Good dentition, No lesions  NECK: Supple, No JVD, Normal thyroid  CHEST/LUNG: poor air entry bilaterally: no wheezing   CVS: Regular rate and rhythm; No murmurs, rubs, or gallops  GI: : Soft, Nontender, Nondistended; Bowel sounds present  NERVOUS SYSTEM:  Alert & Oriented X3  EXTREMITIES:- edema  LYMPH: No lymphadenopathy noted  SKIN: No rashes or lesions  ENDOCRINOLOGY: No Thyromegaly  PSYCH: Appropriate    Labs:  2.4<52<4>>52<<7.365>>2.4<<3><<4><<5<<529>>, UNK<51<4>>61<<7.385>>Venous<<3><<4><<5<<619>>  CARDIAC MARKERS ( 2018 01:21 )  x     / 0.02 ng/mL / x     / x     / x      CARDIAC MARKERS ( 2018 20:53 )  x     / 0.02 ng/mL / x     / x     / x      CARDIAC MARKERS ( 2018 19:41 )  x     / 0.02 ng/mL / 48 U/L / x     / 3.0 ng/mL  CARDIAC MARKERS ( 2018 14:42 )  x     / 0.03 ng/mL / 82 U/L / x     / 3.4 ng/mL                            13.6   17.8  )-----------( 236      ( 2018 14:42 )             41.7     06    138  |  97  |  33<H>  ----------------------------<  366<H>  4.8   |  28  |  1.54<H>      136  |  96  |  28<H>  ----------------------------<  195<H>  4.9   |  26  |  1.81<H>    Ca    9.4      2018 05:38  Ca    10.4      2018 14:42    TPro  7.8  /  Alb  3.7  /  TBili  0.6  /  DBili  x   /  AST  36  /  ALT  15  /  AlkPhos  101  06-11    CAPILLARY BLOOD GLUCOSE      POCT Blood Glucose.: 269 mg/dL (2018 08:32)  POCT Blood Glucose.: 371 mg/dL (2018 21:48)    LIVER FUNCTIONS - ( 2018 14:42 )  Alb: 3.7 g/dL / Pro: 7.8 g/dL / ALK PHOS: 101 U/L / ALT: 15 U/L / AST: 36 U/L / GGT: x           PT/INR - ( 2018 14:42 )   PT: 12.4 sec;   INR: 1.14 ratio         PTT - ( 2018 14:42 )  PTT:27.5 sec  Urinalysis Basic - ( 2018 05:00 )    Color: PL Yellow / Appearance: Clear / S.023 / pH: x  Gluc: x / Ketone: Negative  / Bili: Negative / Urobili: Negative   Blood: x / Protein: Negative / Nitrite: Negative   Leuk Esterase: Negative / RBC: x / WBC x   Sq Epi: x / Non Sq Epi: x / Bacteria: x      D DImer      Studies  Chest X-RAY  CT SCAN Chest   CT Abdomen  Venous Dopplers: LE:   Others    < from: CT Chest No Cont (18 @ 18:11) >    Reconstructions were performed in axial thin, axial maximum intensity   projection, sagittal and coronal planes.    COMPARISON: CT of the chest dated 2018.    FINDINGS:    LUNGS AND LARGE AIRWAYS: Mucoid impaction of several lower lobe bronchi.   Bilateral centrilobular emphysema. A 3 mm peripheral right upper lobe   calcified granuloma. Bilateral lower lobe parenchymal consolidation, new   from 2018 concerning for pneumonia.  PLEURA: No pleural effusion.  VESSELS: Mild atheromatous disease of the aorta.  HEART: Heart size is normal. No pericardial effusion. Aortic valvular and   coronary artery calcifications.  MEDIASTINUM AND FRANKLYN: A subcarinal lymph node measures 1.6 x 1.2 cm   (3:75), and is new.  CHEST WALL AND LOWER NECK: Status post sternotomy.  UPPER ABDOMEN: Within normal limits.  BONES: Mild degenerative changes of the visualized cervicothoracic spine.   Status post sternotomy.     IMPRESSION:    Bilateral lower lobe patchy consolidations concerning for pneumonia.   Follow-up to resolution is recommended.    Centrilobular emphysema.    Mildly enlarged subcarinal lymph node, which may be reactive.                LAITH GROSSMAN M.D., RADIOLOGY RESIDENT  This document has been electronically signed.  JEANNE GILL M.D., ATTENDING RADIOLOGIST  This document has been electronically signed. 2018  6:44PM    < end of copied text >

## 2018-06-12 NOTE — CONSULT NOTE ADULT - ASSESSMENT
78 male history of COPD on 2LNC at home, CAD, DM 2, HTN, HLD, PVD started feeling unwell 2 days ago.
Assessment  DMT2: 78y Male with DM T2 with hyperglycemia on insulin, blood sugars running high, no hypoglycemic episode,  eating meals,  non compliant with low carb diet.  CAD: On medications, stable, monitored.  Pneumonia: on treatment, stable.
78 male history of COPD on 2LNC at home, CAD, DM 2, HTN, HLD, PVD started feeling unwell 2 days ago with fever, leukocytosis, sepsis, CAP

## 2018-06-12 NOTE — CONSULT NOTE ADULT - PROBLEM SELECTOR RECOMMENDATION 2
On medications, monitored and followed up by primary/cardiology team
cont symbicort as well as antibiotics: NO NEED FOR STEROIDS AT [facility] STIME: He has chronic compensated hypercarbic respiratory failure!
-due to above  -cont to trend

## 2018-06-12 NOTE — CONSULT NOTE ADULT - PROBLEM SELECTOR RECOMMENDATION 9
Will increase Lantus to 16 units at bed time.  Will increase Humalog to 7 units before each meal in addition to Humalog correction scale coverage.  Patient counseled for compliance with consistent low carb diet.
cont antibtiocs
-stable  -ceftriaxone 1 gm iv q24 + zithromax 500 mg iv q24  -suspect CAP  -check bcx and urine legionella antigen  -check sputum cx if productive

## 2018-06-12 NOTE — CONSULT NOTE ADULT - PROBLEM SELECTOR RECOMMENDATION 3
Continue treatment, FU primary team.
Subcarinal: ++: ? reactive: has pneumonia at this time: Would need to be followed up as an outpatient:
-due to PNA  -cont to trend

## 2018-06-12 NOTE — CONSULT NOTE ADULT - PROBLEM SELECTOR RECOMMENDATION 4
On meds primary team following up
running high secondary to steroids: defer to PMD
-as above  -appreciate pulm input

## 2018-06-12 NOTE — CONSULT NOTE ADULT - SUBJECTIVE AND OBJECTIVE BOX
YUE COTTO 78y Male  MRN-[id number]    Patient is a 78y old  Male who presents with a chief complaint of SOB (2018 20:47)      HPI:  78 male history of COPD on 2LNC at home, CAD, DM 2, HTN, HLD, PVD started feeling unwell 2 days ago. His wife attributed it to COPD as at night patient had found that his O2 had slipped out of his nose. She took him to the vascular attending office where they found he was tachycardic and hypotensive and sent him to the Emergency Department. He later developed substernal, pressure-like, non-radiating, improved with O2 on.   BP was 98/60 and HR 130s so sent to Emergency Department. Seen by outpatient cardiology attending who diagnosed sinus tach. No lightheadedness or chest pain or shortness of breath. Currently subjectively feels much improved. (2018 17:30)      PAST MEDICAL & SURGICAL HISTORY:  PVD (peripheral vascular disease)  Hypertension  COPD (chronic obstructive pulmonary disease)  Osteomyelitis  Dyslipidemia  CAD (Coronary Artery Disease)  Diabetes Mellitus, Type II  CABG (Coronary Artery Bypass Graft)      Allergies    No Known Allergies    Intolerances    shellfish (Nausea)      ANTIMICROBIALS:  azithromycin   Tablet 500 daily  cefTRIAXone   IVPB 1 every 24 hours      MEDICATIONS  (STANDING):  aspirin enteric coated 81 milliGRAM(s) Oral daily  atorvastatin 40 milliGRAM(s) Oral at bedtime  azithromycin   Tablet 500 milliGRAM(s) Oral daily  buDESOnide 160 MICROgram(s)/formoterol 4.5 MICROgram(s) Inhaler 2 Puff(s) Inhalation two times a day  cefTRIAXone   IVPB 1 Gram(s) IV Intermittent every 24 hours  clopidogrel Tablet 75 milliGRAM(s) Oral daily  dextrose 5%. 1000 milliLiter(s) (50 mL/Hr) IV Continuous <Continuous>  dextrose 5%. 1000 milliLiter(s) (50 mL/Hr) IV Continuous <Continuous>  dextrose 50% Injectable 12.5 Gram(s) IV Push once  dextrose 50% Injectable 25 Gram(s) IV Push once  dextrose 50% Injectable 25 Gram(s) IV Push once  dextrose 50% Injectable 12.5 Gram(s) IV Push once  dextrose 50% Injectable 25 Gram(s) IV Push once  dextrose 50% Injectable 25 Gram(s) IV Push once  heparin  Injectable 5000 Unit(s) SubCutaneous every 12 hours  insulin lispro (HumaLOG) corrective regimen sliding scale   SubCutaneous three times a day before meals  insulin lispro (HumaLOG) corrective regimen sliding scale   SubCutaneous at bedtime  multivitamin/minerals 1 Tablet(s) Oral daily  tamsulosin 0.4 milliGRAM(s) Oral at bedtime      Social History  Smoking:  Etoh:  Drug use:      FAMILY HISTORY:  Family history of diabetes mellitus (Sibling)      Vital Signs Last 24 Hrs  T(C): 36.4 (2018 07:15), Max: 38 (2018 14:20)  T(F): 97.6 (2018 07:15), Max: 100.4 (2018 14:20)  HR: 96 (2018 07:15) (87 - 124)  BP: 133/77 (2018 07:15) (98/62 - 133/77)  BP(mean): --  RR: 20 (2018 07:15) (18 - 23)  SpO2: 100% (2018 07:15) (95% - 100%)    CBC Full  -  ( 2018 14:42 )  WBC Count : 17.8 K/uL  Hemoglobin : 13.6 g/dL  Hematocrit : 41.7 %  Platelet Count - Automated : 236 K/uL  Mean Cell Volume : 89.0 fl  Mean Cell Hemoglobin : 29.0 pg  Mean Cell Hemoglobin Concentration : 32.5 gm/dL  Auto Neutrophil # : 14.9 K/uL  Auto Lymphocyte # : 1.0 K/uL  Auto Monocyte # : 1.8 K/uL  Auto Eosinophil # : 0.1 K/uL  Auto Basophil # : 0.0 K/uL  Auto Neutrophil % : 83.7 %  Auto Lymphocyte % : 5.7 %  Auto Monocyte % : 10.0 %  Auto Eosinophil % : 0.3 %  Auto Basophil % : 0.3 %        138  |  97  |  33<H>  ----------------------------<  366<H>  4.8   |  28  |  1.54<H>    Ca    9.4      2018 05:38    TPro  7.8  /  Alb  3.7  /  TBili  0.6  /  DBili  x   /  AST  36  /  ALT  15  /  AlkPhos  101  06-11    LIVER FUNCTIONS - ( 2018 14:42 )  Alb: 3.7 g/dL / Pro: 7.8 g/dL / ALK PHOS: 101 U/L / ALT: 15 U/L / AST: 36 U/L / GGT: x           Urinalysis Basic - ( 2018 05:00 )    Color: PL Yellow / Appearance: Clear / S.023 / pH: x  Gluc: x / Ketone: Negative  / Bili: Negative / Urobili: Negative   Blood: x / Protein: Negative / Nitrite: Negative   Leuk Esterase: Negative / RBC: x / WBC x   Sq Epi: x / Non Sq Epi: x / Bacteria: x        MICROBIOLOGY:          v    Rapid RVP Result: NotDetec ( @ 19:41)            RADIOLOGY YUE COTTO 78y Male  MRN-[id number]    Patient is a 78y old  Male who presents with a chief complaint of SOB (2018 20:47)      HPI:  78 male history of COPD on 2LNC at home, CAD, DM 2, HTN, HLD, PVD started feeling unwell 2 days ago. His wife attributed it to COPD as at night patient had found that his O2 had slipped out of his nose. She took him to the vascular attending office where they found he was tachycardic and hypotensive and sent him to the Emergency Department. He later developed substernal, pressure-like, non-radiating, improved with O2 on.   BP was 98/60 and HR 130s so sent to Emergency Department. Seen by outpatient cardiology attending who diagnosed sinus tach. No lightheadedness or chest pain or shortness of breath. Currently subjectively feels much improved. (2018 17:30)    Denies sick contacts, recent abx use      PAST MEDICAL & SURGICAL HISTORY:  PVD (peripheral vascular disease)  Hypertension  COPD (chronic obstructive pulmonary disease)  Osteomyelitis  Dyslipidemia  CAD (Coronary Artery Disease)  Diabetes Mellitus, Type II  CABG (Coronary Artery Bypass Graft)      Allergies    No Known Allergies    Intolerances    shellfish (Nausea)      ANTIMICROBIALS:  azithromycin   Tablet 500 daily  cefTRIAXone   IVPB 1 every 24 hours      MEDICATIONS  (STANDING):  aspirin enteric coated 81 milliGRAM(s) Oral daily  atorvastatin 40 milliGRAM(s) Oral at bedtime  azithromycin   Tablet 500 milliGRAM(s) Oral daily  buDESOnide 160 MICROgram(s)/formoterol 4.5 MICROgram(s) Inhaler 2 Puff(s) Inhalation two times a day  cefTRIAXone   IVPB 1 Gram(s) IV Intermittent every 24 hours  clopidogrel Tablet 75 milliGRAM(s) Oral daily  dextrose 5%. 1000 milliLiter(s) (50 mL/Hr) IV Continuous <Continuous>  dextrose 5%. 1000 milliLiter(s) (50 mL/Hr) IV Continuous <Continuous>  dextrose 50% Injectable 12.5 Gram(s) IV Push once  dextrose 50% Injectable 25 Gram(s) IV Push once  dextrose 50% Injectable 25 Gram(s) IV Push once  dextrose 50% Injectable 12.5 Gram(s) IV Push once  dextrose 50% Injectable 25 Gram(s) IV Push once  dextrose 50% Injectable 25 Gram(s) IV Push once  heparin  Injectable 5000 Unit(s) SubCutaneous every 12 hours  insulin lispro (HumaLOG) corrective regimen sliding scale   SubCutaneous three times a day before meals  insulin lispro (HumaLOG) corrective regimen sliding scale   SubCutaneous at bedtime  multivitamin/minerals 1 Tablet(s) Oral daily  tamsulosin 0.4 milliGRAM(s) Oral at bedtime      Social History  Smoking: former smoker  Etoh: no  Drug use: no      FAMILY HISTORY:  Family history of diabetes mellitus (Sibling)      Vital Signs Last 24 Hrs  T(C): 36.4 (2018 07:15), Max: 38 (2018 14:20)  T(F): 97.6 (2018 07:15), Max: 100.4 (2018 14:20)  HR: 96 (2018 07:15) (87 - 124)  BP: 133/77 (2018 07:15) (98/62 - 133/77)  BP(mean): --  RR: 20 (2018 07:15) (18 - 23)  SpO2: 100% (2018 07:15) (95% - 100%)    CBC Full  -  ( 2018 14:42 )  WBC Count : 17.8 K/uL  Hemoglobin : 13.6 g/dL  Hematocrit : 41.7 %  Platelet Count - Automated : 236 K/uL  Mean Cell Volume : 89.0 fl  Mean Cell Hemoglobin : 29.0 pg  Mean Cell Hemoglobin Concentration : 32.5 gm/dL  Auto Neutrophil # : 14.9 K/uL  Auto Lymphocyte # : 1.0 K/uL  Auto Monocyte # : 1.8 K/uL  Auto Eosinophil # : 0.1 K/uL  Auto Basophil # : 0.0 K/uL  Auto Neutrophil % : 83.7 %  Auto Lymphocyte % : 5.7 %  Auto Monocyte % : 10.0 %  Auto Eosinophil % : 0.3 %  Auto Basophil % : 0.3 %        138  |  97  |  33<H>  ----------------------------<  366<H>  4.8   |  28  |  1.54<H>    Ca    9.4      2018 05:38    TPro  7.8  /  Alb  3.7  /  TBili  0.6  /  DBili  x   /  AST  36  /  ALT  15  /  AlkPhos  101  06-11    LIVER FUNCTIONS - ( 2018 14:42 )  Alb: 3.7 g/dL / Pro: 7.8 g/dL / ALK PHOS: 101 U/L / ALT: 15 U/L / AST: 36 U/L / GGT: x           Urinalysis Basic - ( 2018 05:00 )    Color: PL Yellow / Appearance: Clear / S.023 / pH: x  Gluc: x / Ketone: Negative  / Bili: Negative / Urobili: Negative   Blood: x / Protein: Negative / Nitrite: Negative   Leuk Esterase: Negative / RBC: x / WBC x   Sq Epi: x / Non Sq Epi: x / Bacteria: x        MICROBIOLOGY:    Rapid RVP Result: NotDetec ( @ 19:41)    RADIOLOGY    CT Chest No Cont (18 @ 18:11) >  Bilateral lower lobe patchy consolidations concerning for pneumonia.   Follow-up to resolution is recommended.    Centrilobular emphysema.    Mildly enlarged subcarinal lymph node, which may be reactive.

## 2018-06-12 NOTE — CONSULT NOTE ADULT - ATTENDING COMMENTS
Ney Regan  Attending Physician   Division of Infectious Disease  Pager #[phone]  After 5pm/weekend or no response, call #[phone]
check venous dopplers bl le

## 2018-06-13 ENCOUNTER — TRANSCRIPTION ENCOUNTER (OUTPATIENT)
Age: 78
End: 2018-06-13

## 2018-06-13 DIAGNOSIS — J18.9 PNEUMONIA, UNSPECIFIED ORGANISM: ICD-10-CM

## 2018-06-13 LAB
ANION GAP SERPL CALC-SCNC: 10 MMOL/L — SIGNIFICANT CHANGE UP (ref 5–17)
BUN SERPL-MCNC: 27 MG/DL — HIGH (ref 7–23)
CALCIUM SERPL-MCNC: 9.8 MG/DL — SIGNIFICANT CHANGE UP (ref 8.4–10.5)
CHLORIDE SERPL-SCNC: 100 MMOL/L — SIGNIFICANT CHANGE UP (ref 96–108)
CO2 SERPL-SCNC: 27 MMOL/L — SIGNIFICANT CHANGE UP (ref 22–31)
CREAT SERPL-MCNC: 1.28 MG/DL — SIGNIFICANT CHANGE UP (ref 0.5–1.3)
CULTURE RESULTS: SIGNIFICANT CHANGE UP
GLUCOSE BLDC GLUCOMTR-MCNC: 122 MG/DL — HIGH (ref 70–99)
GLUCOSE BLDC GLUCOMTR-MCNC: 150 MG/DL — HIGH (ref 70–99)
GLUCOSE BLDC GLUCOMTR-MCNC: 162 MG/DL — HIGH (ref 70–99)
GLUCOSE BLDC GLUCOMTR-MCNC: 224 MG/DL — HIGH (ref 70–99)
GLUCOSE SERPL-MCNC: 170 MG/DL — HIGH (ref 70–99)
HCT VFR BLD CALC: 37.2 % — LOW (ref 39–50)
HGB BLD-MCNC: 11.1 G/DL — LOW (ref 13–17)
MCHC RBC-ENTMCNC: 26 PG — LOW (ref 27–34)
MCHC RBC-ENTMCNC: 29.8 GM/DL — LOW (ref 32–36)
MCV RBC AUTO: 87.1 FL — SIGNIFICANT CHANGE UP (ref 80–100)
PLATELET # BLD AUTO: 278 K/UL — SIGNIFICANT CHANGE UP (ref 150–400)
POTASSIUM SERPL-MCNC: 4.3 MMOL/L — SIGNIFICANT CHANGE UP (ref 3.5–5.3)
POTASSIUM SERPL-SCNC: 4.3 MMOL/L — SIGNIFICANT CHANGE UP (ref 3.5–5.3)
RBC # BLD: 4.27 M/UL — SIGNIFICANT CHANGE UP (ref 4.2–5.8)
RBC # FLD: 13.4 % — SIGNIFICANT CHANGE UP (ref 10.3–14.5)
SODIUM SERPL-SCNC: 137 MMOL/L — SIGNIFICANT CHANGE UP (ref 135–145)
SPECIMEN SOURCE: SIGNIFICANT CHANGE UP
T4 FREE SERPL-MCNC: 1.2 NG/DL — SIGNIFICANT CHANGE UP (ref 0.9–1.8)
TSH SERPL-MCNC: 1.18 UIU/ML — SIGNIFICANT CHANGE UP (ref 0.27–4.2)
WBC # BLD: 13.5 K/UL — HIGH (ref 3.8–10.5)
WBC # FLD AUTO: 13.5 K/UL — HIGH (ref 3.8–10.5)

## 2018-06-13 PROCEDURE — 99232 SBSQ HOSP IP/OBS MODERATE 35: CPT

## 2018-06-13 RX ORDER — CEFTRIAXONE 500 MG/1
1 INJECTION, POWDER, FOR SOLUTION INTRAMUSCULAR; INTRAVENOUS EVERY 24 HOURS
Qty: 0 | Refills: 0 | Status: DISCONTINUED | OUTPATIENT
Start: 2018-06-13 | End: 2018-06-14

## 2018-06-13 RX ORDER — ACETAMINOPHEN 500 MG
650 TABLET ORAL EVERY 6 HOURS
Qty: 0 | Refills: 0 | Status: DISCONTINUED | OUTPATIENT
Start: 2018-06-13 | End: 2018-06-14

## 2018-06-13 RX ADMIN — CLOPIDOGREL BISULFATE 75 MILLIGRAM(S): 75 TABLET, FILM COATED ORAL at 12:14

## 2018-06-13 RX ADMIN — ATORVASTATIN CALCIUM 40 MILLIGRAM(S): 80 TABLET, FILM COATED ORAL at 21:45

## 2018-06-13 RX ADMIN — Medication 650 MILLIGRAM(S): at 15:26

## 2018-06-13 RX ADMIN — Medication 7 UNIT(S): at 17:47

## 2018-06-13 RX ADMIN — Medication 81 MILLIGRAM(S): at 12:14

## 2018-06-13 RX ADMIN — CEFTRIAXONE 100 GRAM(S): 500 INJECTION, POWDER, FOR SOLUTION INTRAMUSCULAR; INTRAVENOUS at 19:39

## 2018-06-13 RX ADMIN — AZITHROMYCIN 500 MILLIGRAM(S): 500 TABLET, FILM COATED ORAL at 12:14

## 2018-06-13 RX ADMIN — INSULIN GLARGINE 16 UNIT(S): 100 INJECTION, SOLUTION SUBCUTANEOUS at 21:45

## 2018-06-13 RX ADMIN — TAMSULOSIN HYDROCHLORIDE 0.4 MILLIGRAM(S): 0.4 CAPSULE ORAL at 21:45

## 2018-06-13 RX ADMIN — BUDESONIDE AND FORMOTEROL FUMARATE DIHYDRATE 2 PUFF(S): 160; 4.5 AEROSOL RESPIRATORY (INHALATION) at 19:42

## 2018-06-13 RX ADMIN — Medication 1: at 08:18

## 2018-06-13 RX ADMIN — Medication 650 MILLIGRAM(S): at 05:20

## 2018-06-13 RX ADMIN — Medication 650 MILLIGRAM(S): at 16:15

## 2018-06-13 RX ADMIN — Medication 650 MILLIGRAM(S): at 04:43

## 2018-06-13 RX ADMIN — Medication 1 TABLET(S): at 12:14

## 2018-06-13 RX ADMIN — BUDESONIDE AND FORMOTEROL FUMARATE DIHYDRATE 2 PUFF(S): 160; 4.5 AEROSOL RESPIRATORY (INHALATION) at 06:28

## 2018-06-13 RX ADMIN — HEPARIN SODIUM 5000 UNIT(S): 5000 INJECTION INTRAVENOUS; SUBCUTANEOUS at 06:28

## 2018-06-13 RX ADMIN — HEPARIN SODIUM 5000 UNIT(S): 5000 INJECTION INTRAVENOUS; SUBCUTANEOUS at 19:41

## 2018-06-13 RX ADMIN — Medication 7 UNIT(S): at 08:18

## 2018-06-13 RX ADMIN — Medication 7 UNIT(S): at 12:11

## 2018-06-13 NOTE — PHYSICAL THERAPY INITIAL EVALUATION ADULT - GAIT DEVIATIONS NOTED, PT EVAL
decreased ara/decreased weight-shifting ability/decreased step length/decreased stride length/increased time in double stance

## 2018-06-13 NOTE — SWALLOW BEDSIDE ASSESSMENT ADULT - SLP GENERAL OBSERVATIONS
Pt awake, alert and oriented x 3; Pt OOB in chair and reports that he is tolerating the current diet; denies s/s of aspiration.  Pt endorses that sometimes he eats rapidly.  Good phonation and speech intelligibility noted.  Pt eating lunch; OOB, in chair upon encounter.

## 2018-06-13 NOTE — DISCHARGE NOTE ADULT - MEDICATION SUMMARY - MEDICATIONS TO STOP TAKING
I will STOP taking the medications listed below when I get home from the hospital:    predniSONE 10 mg oral tablet  -- 4 tabs q daily x3 days, then 3 tabs q daily x3 days, then 2 tabs q daily x3 days, then 1 tab q daily x3 days  -- It is very important that you take or use this exactly as directed.  Do not skip doses or discontinue unless directed by your doctor.  Obtain medical advice before taking any non-prescription drugs as some may affect the action of this medication.  Take with food or milk.    azithromycin 250 mg oral tablet  -- 1 tab(s) by mouth once a day    Symbicort 160 mcg-4.5 mcg/inh inhalation aerosol  -- 2 puff(s) inhaled 2 times a day   -- Check with your doctor before becoming pregnant.  For inhalation only.  Rinse mouth thoroughly after use.    Tudorza Pressair 400 mcg/inh inhalation powder  -- 400 microgram(s) inhaled 2 times a day   -- Check with your doctor before becoming pregnant.  For inhalation only.  Obtain medical advice before taking any non-prescription drugs as some may affect the action of this medication.    predniSONE 50 mg oral tablet  -- 1 tab(s) by mouth once a day   -- It is very important that you take or use this exactly as directed.  Do not skip doses or discontinue unless directed by your doctor.  Obtain medical advice before taking any non-prescription drugs as some may affect the action of this medication.  Take with food or milk.

## 2018-06-13 NOTE — PHYSICAL THERAPY INITIAL EVALUATION ADULT - ADDITIONAL COMMENTS
As per pt, PTA, Pt lived in PH with 15 steps to enter, 1 flight inside to bedroom/bath. PTA pt was independent with ambulation and ADL's w/o AD. Pt on 2L O2 at home at night only not for functional mobility

## 2018-06-13 NOTE — SWALLOW BEDSIDE ASSESSMENT ADULT - COMMENTS
6/11/18 CT Chest: IMPRESSION:  Bilateral lower lobe patchy consolidations concerning for pneumonia.  Follow-up to resolution is recommended.  Centrilobular emphysema.  Mildly enlarged subcarinal lymph node, which may be reactive.

## 2018-06-13 NOTE — DISCHARGE NOTE ADULT - PLAN OF CARE
sepsis resolves Take all antibiotics as ordered.  Call you Health care provider upon arrival home to make a one week follow up appointment.  If you develop fever, chills, malaise, or change in mental status call your Health Care Provider or go to the Emergency Department.  Nutrition is important, eat small frequent meals to help ensure you get adequate calories.  Do not stay in bed all day!  Increase your activity daily as tolerated. complete antibiotics  Follow up with PMD/pulmonary for repeat CT Chest in 1 month for clearance and to evaluate lymph nodes in chest Call your Health Care provider upon arrival home to make a follow up appointment within one week.  Take all inhalers as prescribed by your Health Care Provider.  Take steroids as prescribed by your Health Care Provider.  If your cough increases infrequency and severity and/or you have shortness of breath or increased shortness of breath call your Health Care Provider.  If you develop fever, chills, night sweats, malaise, and/or change in mental status call your Health care Provider.  Nutrition is very important.  Eat small frequent meals.  Increase your activity as tolerated.  Do not stay in bed all day HgA1C this admission  6.2  Make sure you get your HgA1c checked every three months.  If you take oral diabetes medications, check your blood glucose two times a day.  If you take insulin, check your blood glucose before meals and at bedtime.  It's important not to skip any meals.  Keep a log of your blood glucose results and always take it with you to your doctor appointments.  Keep a list of your current medications including injectables and over the counter medications and bring this medication list with you to all your doctor appointments.  If you have not seen your ophthalmologist this year call for appointment.  Check your feet daily for redness, sores, or openings. Do not self treat. If no improvement in two days call your primary care physician for an appointment.  Low blood sugar (hypoglycemia) is a blood sugar below 70mg/dl. Check your blood sugar if you feel signs/symptoms of hypoglycemia. If your blood sugar is below 70 take 15 grams of carbohydrates (ex 4 oz of apple juice, 3-4 glucose tablets, or 4-6 oz of regular soda) wait 15 minutes and repeat blood sugar to make sure it comes up above 70.  If your blood sugar is above 70 and you are due for a meal, have a meal.  If you are not due for a meal have a snack.  This snack helps keeps your blood sugar at a safe range. Coronary artery disease is a condition where the arteries the supply the heart muscle get clogges with fatty deposits & puts you at risk for a heart attack  Call your doctor if you have any new pain, pressure, or discomfort in the center of your chest, pain, tingling or discomfort in arms, back, neck, jaw, or stomach, shortness of breath, nausea, vomiting, burping or heartburn, sweating, cold and clammy skin, racing or abnormal heartbeat for more than 10 minutes or if they keep coming & going.  Call 911 and do not tr to get to hospital by care  You can help yourself with lefestyle changes (quitting smoking if you smoke), eat lots of fruits & vegetables & low fat dairy products, not a lot of meat & fatty foods, walk or some form of physical activity most days of the week, lose weight if you are overweight  Take your cardiac medication as prescribed to lower cholesterol, to lower blood pressure, aspirin to prevent blood clots, and diabetes control  Make sure to keep appointments with doctor for cardiac follow up care Resolved

## 2018-06-13 NOTE — PHYSICAL THERAPY INITIAL EVALUATION ADULT - PERTINENT HX OF CURRENT PROBLEM, REHAB EVAL
Pt is a 78yoM history of COPD on 2LNC at home, CAD, DM 2, HTN, HLD, PVD started feeling unwell 2 days ago. His wife attributed it to COPD as at night patient had found that his O2 had slipped out of his nose. She took him to the vascular attending office where they found he was tachycardic and hypotensive and sent him to the ED. He later developed substernal, pressure-like, non-radiating pain, improved with O2 on

## 2018-06-13 NOTE — DISCHARGE NOTE ADULT - MEDICATION SUMMARY - MEDICATIONS TO TAKE
I will START or STAY ON the medications listed below when I get home from the hospital:    tumeric  -- once a day  -- Indication: For Supplement    acetaminophen 325 mg oral tablet  -- 2 tab(s) by mouth every 6 hours, As needed, Moderate Pain (4 - 6)  -- Indication: For Pain    aspirin 81 mg oral tablet  -- 1 tab(s) by mouth once a day  -- Indication: For Heart disease    tamsulosin 0.4 mg oral capsule  -- 1 cap(s) by mouth once a day (at bedtime)  -- Indication: For Prostate enlargement    glipiZIDE 2.5 mg oral tablet, extended release  -- 1 tab(s) by mouth once a day  -- Indication: For Diabetes mellitus    metFORMIN 1000 mg oral tablet  -- 1 tab(s) by mouth 2 times a day  -- Indication: For Diabetes mellitus    atorvastatin 40 mg oral tablet  -- 1 tab(s) by mouth once a day (at bedtime)  -- Indication: For High cholesterol    Diovan  mg-12.5 mg oral tablet  -- 1 tab(s) by mouth once a day  -- Indication: For High bp    clopidogrel 75 mg oral tablet  -- 1 tab(s) by mouth once a day  -- Indication: For Heart disease    theophylline 400 mg/24 hours oral tablet, extended release  -- 1 tab(s) by mouth every 12 hours  -- Indication: For COPD (chronic obstructive pulmonary disease)    albuterol 2.5 mg/3 mL (0.083%) inhalation solution  -- 3 milliliter(s) inhaled every 6 hours, As Needed  -- Indication: For COPD (chronic obstructive pulmonary disease)    Breo Ellipta 200 mcg-25 mcg/inh inhalation powder  -- 1 puff(s) inhaled once a day  -- Indication: For COPD (chronic obstructive pulmonary disease)    Incruse Ellipta 62.5 mcg/inh inhalation powder  -- 1 puff(s) inhaled once a day  -- Indication: For COPD (chronic obstructive pulmonary disease)    cefuroxime 500 mg oral tablet  -- 1 tab(s) by mouth every 12 hours   -- Finish all this medication unless otherwise directed by prescriber.  Medication should be taken with plenty of water.  Take with food or milk.    -- Indication: For Pneumonia    Fish Oil oral capsule  -- 2 cap(s) by mouth once a day  -- Indication: For Supplement    CoQ10  -- 1 cap(s) by mouth once a day  -- Indication: For Supplement    Multiple Vitamins oral tablet  -- 1 tab(s) by mouth once a day  -- Indication: For Supplement    Vitamin C  -- 1 tab(s) by mouth once a day  -- Indication: For Supplement

## 2018-06-13 NOTE — PHYSICAL THERAPY INITIAL EVALUATION ADULT - STRENGTHENING, PT EVAL
GOAL: Pt will improve strength to at least 4+/5 t/o in 4 weeks in order to improve safety with functional mobility and ADL's.

## 2018-06-13 NOTE — DISCHARGE NOTE ADULT - HOSPITAL COURSE
78 male history of COPD on 2LNC at home, CAD, DM 2, HTN, HLD, PVD started feeling unwell 2 days ago. His wife attributed it to COPD as at night patient had found that his O2 had slipped out of his nose. She took him to the vascular attending office where they found he was tachycardic and hypotensive and sent him to the Emergency Department. He later developed substernal, pressure-like, non-radiating, improved with O2 on.   BP was 98/60 and HR 130s so sent to Emergency Department. Seen by outpatient cardiology attending who diagnosed sinus tach. Admitted with Severe sepsis. CT c Plan: elevated HR, lactic acidosis, leukocytosis, likely source clinically pneumonia, MERRILL  s/p IV fluids with improved BP  urine culture blood cultures pending   s/p ceftriaxone IV and azithromycin IV  pulmonary evaluation and ID evaluation in AM  clinically improving already. 78 male history of COPD on 2LNC at home, CAD, DM 2, HTN, HLD, PVD started feeling unwell 2 days ago. His wife attributed it to COPD as at night patient had found that his O2 had slipped out of his nose. She took him to the vascular attending office where they found he was tachycardic and hypotensive and sent him to the Emergency Department. He later developed substernal, pressure-like, non-radiating, improved with O2 on.   BP was 98/60 and HR 130s so sent to Emergency Department. Seen by outpatient cardiology attending who diagnosed sinus tach. Admitted with Severe sepsis with  elevated HR, lactic acidosis, leukocytosis, likely source clinically pneumonia and  MERRILL. CT chest with bilateral  lower lobe patchy consolidations concerning for pneumonia and enlarged sub carinal lymph nodes; Centrilobular emphysema. Pulmonary, cardiology, ID consulted. :   s/p IV fluids with improved BP  urine culture blood cultures pending   s/p ceftriaxone IV and azithromycin IV  pulmonary evaluation and ID evaluation in AM  clinically improving already. 78 male history of COPD on 2LNC at home, CAD, DM 2, HTN, HLD, PVD started feeling unwell 2 days ago. His wife attributed it to COPD as at night patient had found that his O2 had slipped out of his nose. She took him to the vascular attending office where they found he was tachycardic and hypotensive and sent him to the Emergency Department. He later developed substernal, pressure-like, non-radiating, improved with O2 on.   BP was 98/60 and HR 130s so sent to Emergency Department. Seen by outpatient cardiology attending who diagnosed sinus tach. Admitted with Severe sepsis with  elevated HR, lactic acidosis, leukocytosis, likely source clinically pneumonia and  MERRILL. CT chest with bilateral  lower lobe patchy consolidations concerning for pneumonia and enlarged sub carinal lymph nodes; Centrilobular emphysema. Pulmonary, cardiology, ID consulted. : s/p IV fluids with improved BP; continued  ceftriaxone IV and azithromycin IV and nebulizers. Clinically improving , Pt to complete 7 days of abx. 2 D ECHO with no significant changes from prior. LE duplex with no DVT> Pt discharged home with advise to repeat CT Chest in 1 month and with home PT>

## 2018-06-13 NOTE — DISCHARGE NOTE ADULT - CARE PLAN
Principal Discharge DX:	Severe sepsis  Goal:	sepsis resolves  Assessment and plan of treatment:	Take all antibiotics as ordered.  Call you Health care provider upon arrival home to make a one week follow up appointment.  If you develop fever, chills, malaise, or change in mental status call your Health Care Provider or go to the Emergency Department.  Nutrition is important, eat small frequent meals to help ensure you get adequate calories.  Do not stay in bed all day!  Increase your activity daily as tolerated.  Secondary Diagnosis:	Pneumonia of right middle lobe due to infectious organism  Assessment and plan of treatment:	complete antibiotics  Follow up with PMD/pulmonary for repeat CT Chest in 1 month for clearance and to evaluate lymph nodes in chest  Secondary Diagnosis:	COPD (chronic obstructive pulmonary disease)  Assessment and plan of treatment:	Call your Health Care provider upon arrival home to make a follow up appointment within one week.  Take all inhalers as prescribed by your Health Care Provider.  Take steroids as prescribed by your Health Care Provider.  If your cough increases infrequency and severity and/or you have shortness of breath or increased shortness of breath call your Health Care Provider.  If you develop fever, chills, night sweats, malaise, and/or change in mental status call your Health care Provider.  Nutrition is very important.  Eat small frequent meals.  Increase your activity as tolerated.  Do not stay in bed all day  Secondary Diagnosis:	Diabetes Mellitus, Type II  Assessment and plan of treatment:	HgA1C this admission  6.2  Make sure you get your HgA1c checked every three months.  If you take oral diabetes medications, check your blood glucose two times a day.  If you take insulin, check your blood glucose before meals and at bedtime.  It's important not to skip any meals.  Keep a log of your blood glucose results and always take it with you to your doctor appointments.  Keep a list of your current medications including injectables and over the counter medications and bring this medication list with you to all your doctor appointments.  If you have not seen your ophthalmologist this year call for appointment.  Check your feet daily for redness, sores, or openings. Do not self treat. If no improvement in two days call your primary care physician for an appointment.  Low blood sugar (hypoglycemia) is a blood sugar below 70mg/dl. Check your blood sugar if you feel signs/symptoms of hypoglycemia. If your blood sugar is below 70 take 15 grams of carbohydrates (ex 4 oz of apple juice, 3-4 glucose tablets, or 4-6 oz of regular soda) wait 15 minutes and repeat blood sugar to make sure it comes up above 70.  If your blood sugar is above 70 and you are due for a meal, have a meal.  If you are not due for a meal have a snack.  This snack helps keeps your blood sugar at a safe range.  Secondary Diagnosis:	CAD (Coronary Artery Disease)  Assessment and plan of treatment:	Coronary artery disease is a condition where the arteries the supply the heart muscle get clogges with fatty deposits & puts you at risk for a heart attack  Call your doctor if you have any new pain, pressure, or discomfort in the center of your chest, pain, tingling or discomfort in arms, back, neck, jaw, or stomach, shortness of breath, nausea, vomiting, burping or heartburn, sweating, cold and clammy skin, racing or abnormal heartbeat for more than 10 minutes or if they keep coming & going.  Call 911 and do not tr to get to hospital by care  You can help yourself with lefestyle changes (quitting smoking if you smoke), eat lots of fruits & vegetables & low fat dairy products, not a lot of meat & fatty foods, walk or some form of physical activity most days of the week, lose weight if you are overweight  Take your cardiac medication as prescribed to lower cholesterol, to lower blood pressure, aspirin to prevent blood clots, and diabetes control  Make sure to keep appointments with doctor for cardiac follow up care  Secondary Diagnosis:	MERRILL (acute kidney injury)  Assessment and plan of treatment:	Resolved

## 2018-06-13 NOTE — SWALLOW BEDSIDE ASSESSMENT ADULT - ASR SWALLOW ASPIRATION MONITOR
upper respiratory infection/throat clearing/pneumonia/cough/gurgly voice/*If evident, report to MD immediately and reconsult this department./change of breathing pattern

## 2018-06-13 NOTE — SWALLOW BEDSIDE ASSESSMENT ADULT - SWALLOW EVAL: DIAGNOSIS
Pt presents with overtly functional oropharyngeal swallowing skills.  No signs or symptoms of laryngeal penetration/aspiration evident with any consistency presented.  Pharyngeal swallow judged to be timely with adequate laryngeal elevation.  Pt reports that he sometimes eats rapidly but denies difficulty swallowing.

## 2018-06-13 NOTE — SWALLOW BEDSIDE ASSESSMENT ADULT - ASR SWALLOW DENTITION
Pt not wearing partial dentures and states he can eat/chew without them as they were soaking at this time.

## 2018-06-13 NOTE — DISCHARGE NOTE ADULT - PATIENT PORTAL LINK FT
You can access the [facility] Patient Portal, offered by [facility], by registering with the following website: [email or web address]

## 2018-06-13 NOTE — DISCHARGE NOTE ADULT - CARE PROVIDER_API CALL
Adan Gallardo), Cardiovascular Disease  [address]  [place] [address]  Phone: [phone]  Fax: [phone]    Billy Jaquez), Critical Care Medicine; Internal Medicine; Pulmonary Disease  [address] [place] [address]  Phone: [phone]  Fax: [phone]

## 2018-06-13 NOTE — SWALLOW BEDSIDE ASSESSMENT ADULT - CONSISTENCIES ADMINISTERED
thin liquid/mech soft/hard solid Pt administered meds by Nursing and he took several pills at once with sips of water; no s/s of aspiration evident; Pt denies difficulty.

## 2018-06-13 NOTE — SWALLOW BEDSIDE ASSESSMENT ADULT - ORAL PHASE
Within functional limits/No signs or symptoms of laryngeal penetration/aspiration evident with any consistency presented.  Pharyngeal swallow judged to be timely with adequate laryngeal elevation. Within functional limits

## 2018-06-13 NOTE — DISCHARGE NOTE ADULT - SECONDARY DIAGNOSIS.
Pneumonia of right middle lobe due to infectious organism COPD (chronic obstructive pulmonary disease) Diabetes Mellitus, Type II CAD (Coronary Artery Disease) MERRILL (acute kidney injury)

## 2018-06-13 NOTE — PHYSICAL THERAPY INITIAL EVALUATION ADULT - PRECAUTIONS/LIMITATIONS, REHAB EVAL
fall precautions/BP was 98/60 and HR 130s so sent to ED. Seen by outpatient cardiology attending who diagnosed sinus tach. Currently subjectively feels much improved. Dx: Severe Sepsis 2/2 PNA, MERRILL

## 2018-06-13 NOTE — SWALLOW BEDSIDE ASSESSMENT ADULT - SLP PERTINENT HISTORY OF CURRENT PROBLEM
78 male history of COPD on 2LNC at home, CAD, DM 2, HTN, HLD, PVD started feeling unwell 2 days ago. His wife attributed it to COPD as at night patient had found that his O2 had slipped out of his nose. She took him to the vascular attending office where they found he was tachycardic and hypotensive and sent him to the Emergency Department. He later developed substernal, pressure-like, non-radiating, improved with O2 on.   BP was 98/60 and HR 130s so sent to Emergency Department. Seen by outpatient cardiology attending who diagnosed sinus tach. No lightheadedness or chest pain or shortness of breath.

## 2018-06-14 VITALS — OXYGEN SATURATION: 84 % | RESPIRATION RATE: 20 BRPM

## 2018-06-14 LAB
GLUCOSE BLDC GLUCOMTR-MCNC: 105 MG/DL — HIGH (ref 70–99)
GLUCOSE BLDC GLUCOMTR-MCNC: 123 MG/DL — HIGH (ref 70–99)
GLUCOSE BLDC GLUCOMTR-MCNC: 169 MG/DL — HIGH (ref 70–99)
HCT VFR BLD CALC: 38.8 % — LOW (ref 39–50)
HGB BLD-MCNC: 12.2 G/DL — LOW (ref 13–17)
LEGIONELLA AG UR QL: NEGATIVE — SIGNIFICANT CHANGE UP
MCHC RBC-ENTMCNC: 27.4 PG — SIGNIFICANT CHANGE UP (ref 27–34)
MCHC RBC-ENTMCNC: 31.4 GM/DL — LOW (ref 32–36)
MCV RBC AUTO: 87 FL — SIGNIFICANT CHANGE UP (ref 80–100)
PLATELET # BLD AUTO: 318 K/UL — SIGNIFICANT CHANGE UP (ref 150–400)
RBC # BLD: 4.46 M/UL — SIGNIFICANT CHANGE UP (ref 4.2–5.8)
RBC # FLD: 13.4 % — SIGNIFICANT CHANGE UP (ref 10.3–14.5)
WBC # BLD: 9.63 K/UL — SIGNIFICANT CHANGE UP (ref 3.8–10.5)
WBC # FLD AUTO: 9.63 K/UL — SIGNIFICANT CHANGE UP (ref 3.8–10.5)

## 2018-06-14 PROCEDURE — 93970 EXTREMITY STUDY: CPT

## 2018-06-14 PROCEDURE — 85610 PROTHROMBIN TIME: CPT

## 2018-06-14 PROCEDURE — 80048 BASIC METABOLIC PNL TOTAL CA: CPT

## 2018-06-14 PROCEDURE — 83036 HEMOGLOBIN GLYCOSYLATED A1C: CPT

## 2018-06-14 PROCEDURE — 82962 GLUCOSE BLOOD TEST: CPT

## 2018-06-14 PROCEDURE — 85014 HEMATOCRIT: CPT

## 2018-06-14 PROCEDURE — 93005 ELECTROCARDIOGRAM TRACING: CPT

## 2018-06-14 PROCEDURE — 82803 BLOOD GASES ANY COMBINATION: CPT

## 2018-06-14 PROCEDURE — 82947 ASSAY GLUCOSE BLOOD QUANT: CPT

## 2018-06-14 PROCEDURE — 87798 DETECT AGENT NOS DNA AMP: CPT

## 2018-06-14 PROCEDURE — 71046 X-RAY EXAM CHEST 2 VIEWS: CPT

## 2018-06-14 PROCEDURE — 84132 ASSAY OF SERUM POTASSIUM: CPT

## 2018-06-14 PROCEDURE — 87486 CHLMYD PNEUM DNA AMP PROBE: CPT

## 2018-06-14 PROCEDURE — 93306 TTE W/DOPPLER COMPLETE: CPT

## 2018-06-14 PROCEDURE — 87633 RESP VIRUS 12-25 TARGETS: CPT

## 2018-06-14 PROCEDURE — 99285 EMERGENCY DEPT VISIT HI MDM: CPT | Mod: 25

## 2018-06-14 PROCEDURE — 87040 BLOOD CULTURE FOR BACTERIA: CPT

## 2018-06-14 PROCEDURE — 87449 NOS EACH ORGANISM AG IA: CPT

## 2018-06-14 PROCEDURE — 87581 M.PNEUMON DNA AMP PROBE: CPT

## 2018-06-14 PROCEDURE — 80053 COMPREHEN METABOLIC PANEL: CPT

## 2018-06-14 PROCEDURE — 93970 EXTREMITY STUDY: CPT | Mod: 26

## 2018-06-14 PROCEDURE — 85027 COMPLETE CBC AUTOMATED: CPT

## 2018-06-14 PROCEDURE — 82553 CREATINE MB FRACTION: CPT

## 2018-06-14 PROCEDURE — 94640 AIRWAY INHALATION TREATMENT: CPT

## 2018-06-14 PROCEDURE — 97161 PT EVAL LOW COMPLEX 20 MIN: CPT

## 2018-06-14 PROCEDURE — 92610 EVALUATE SWALLOWING FUNCTION: CPT

## 2018-06-14 PROCEDURE — 93306 TTE W/DOPPLER COMPLETE: CPT | Mod: 26

## 2018-06-14 PROCEDURE — 84295 ASSAY OF SERUM SODIUM: CPT

## 2018-06-14 PROCEDURE — 83605 ASSAY OF LACTIC ACID: CPT

## 2018-06-14 PROCEDURE — 84484 ASSAY OF TROPONIN QUANT: CPT

## 2018-06-14 PROCEDURE — 84439 ASSAY OF FREE THYROXINE: CPT

## 2018-06-14 PROCEDURE — 82565 ASSAY OF CREATININE: CPT

## 2018-06-14 PROCEDURE — 82435 ASSAY OF BLOOD CHLORIDE: CPT

## 2018-06-14 PROCEDURE — 85730 THROMBOPLASTIN TIME PARTIAL: CPT

## 2018-06-14 PROCEDURE — 83690 ASSAY OF LIPASE: CPT

## 2018-06-14 PROCEDURE — 84443 ASSAY THYROID STIM HORMONE: CPT

## 2018-06-14 PROCEDURE — 96375 TX/PRO/DX INJ NEW DRUG ADDON: CPT

## 2018-06-14 PROCEDURE — 82330 ASSAY OF CALCIUM: CPT

## 2018-06-14 PROCEDURE — 82550 ASSAY OF CK (CPK): CPT

## 2018-06-14 PROCEDURE — 99232 SBSQ HOSP IP/OBS MODERATE 35: CPT

## 2018-06-14 PROCEDURE — 87086 URINE CULTURE/COLONY COUNT: CPT

## 2018-06-14 PROCEDURE — 81003 URINALYSIS AUTO W/O SCOPE: CPT

## 2018-06-14 PROCEDURE — 96374 THER/PROPH/DIAG INJ IV PUSH: CPT

## 2018-06-14 PROCEDURE — 71250 CT THORAX DX C-: CPT

## 2018-06-14 RX ORDER — THEOPHYLLINE ANHYDROUS 200 MG
1 TABLET, EXTENDED RELEASE 12 HR ORAL
Qty: 0 | Refills: 0 | COMMUNITY

## 2018-06-14 RX ORDER — CEFUROXIME AXETIL 250 MG
500 TABLET ORAL EVERY 12 HOURS
Qty: 0 | Refills: 0 | Status: DISCONTINUED | OUTPATIENT
Start: 2018-06-14 | End: 2018-06-14

## 2018-06-14 RX ORDER — THEOPHYLLINE ANHYDROUS 200 MG
1 TABLET, EXTENDED RELEASE 12 HR ORAL
Qty: 0 | Refills: 0 | COMMUNITY
Start: 2018-06-14

## 2018-06-14 RX ORDER — THEOPHYLLINE ANHYDROUS 200 MG
400 TABLET, EXTENDED RELEASE 12 HR ORAL DAILY
Qty: 0 | Refills: 0 | Status: DISCONTINUED | OUTPATIENT
Start: 2018-06-14 | End: 2018-06-14

## 2018-06-14 RX ORDER — ACETAMINOPHEN 500 MG
2 TABLET ORAL
Qty: 0 | Refills: 0 | COMMUNITY
Start: 2018-06-14

## 2018-06-14 RX ORDER — CEFUROXIME AXETIL 250 MG
1 TABLET ORAL
Qty: 8 | Refills: 0 | OUTPATIENT
Start: 2018-06-14 | End: 2018-06-17

## 2018-06-14 RX ORDER — TIOTROPIUM BROMIDE 18 UG/1
1 CAPSULE ORAL; RESPIRATORY (INHALATION) DAILY
Qty: 0 | Refills: 0 | Status: DISCONTINUED | OUTPATIENT
Start: 2018-06-14 | End: 2018-06-14

## 2018-06-14 RX ORDER — CEFUROXIME AXETIL 250 MG
1 TABLET ORAL
Qty: 6 | Refills: 0 | OUTPATIENT
Start: 2018-06-14 | End: 2018-06-16

## 2018-06-14 RX ORDER — CEFTRIAXONE 500 MG/1
1 INJECTION, POWDER, FOR SOLUTION INTRAMUSCULAR; INTRAVENOUS ONCE
Qty: 0 | Refills: 0 | Status: COMPLETED | OUTPATIENT
Start: 2018-06-14 | End: 2018-06-14

## 2018-06-14 RX ADMIN — HEPARIN SODIUM 5000 UNIT(S): 5000 INJECTION INTRAVENOUS; SUBCUTANEOUS at 05:05

## 2018-06-14 RX ADMIN — Medication 650 MILLIGRAM(S): at 15:03

## 2018-06-14 RX ADMIN — HEPARIN SODIUM 5000 UNIT(S): 5000 INJECTION INTRAVENOUS; SUBCUTANEOUS at 17:22

## 2018-06-14 RX ADMIN — Medication 650 MILLIGRAM(S): at 05:38

## 2018-06-14 RX ADMIN — TIOTROPIUM BROMIDE 1 CAPSULE(S): 18 CAPSULE ORAL; RESPIRATORY (INHALATION) at 13:57

## 2018-06-14 RX ADMIN — BUDESONIDE AND FORMOTEROL FUMARATE DIHYDRATE 2 PUFF(S): 160; 4.5 AEROSOL RESPIRATORY (INHALATION) at 05:05

## 2018-06-14 RX ADMIN — Medication 1 TABLET(S): at 13:57

## 2018-06-14 RX ADMIN — Medication 7 UNIT(S): at 08:26

## 2018-06-14 RX ADMIN — Medication 650 MILLIGRAM(S): at 06:10

## 2018-06-14 RX ADMIN — AZITHROMYCIN 500 MILLIGRAM(S): 500 TABLET, FILM COATED ORAL at 13:58

## 2018-06-14 RX ADMIN — CEFTRIAXONE 100 GRAM(S): 500 INJECTION, POWDER, FOR SOLUTION INTRAMUSCULAR; INTRAVENOUS at 15:51

## 2018-06-14 RX ADMIN — CLOPIDOGREL BISULFATE 75 MILLIGRAM(S): 75 TABLET, FILM COATED ORAL at 13:58

## 2018-06-14 RX ADMIN — Medication 500 MILLIGRAM(S): at 15:23

## 2018-06-14 RX ADMIN — Medication 81 MILLIGRAM(S): at 13:58

## 2018-06-14 RX ADMIN — BUDESONIDE AND FORMOTEROL FUMARATE DIHYDRATE 2 PUFF(S): 160; 4.5 AEROSOL RESPIRATORY (INHALATION) at 17:22

## 2018-06-14 RX ADMIN — Medication 7 UNIT(S): at 13:55

## 2018-06-14 RX ADMIN — Medication 650 MILLIGRAM(S): at 14:02

## 2018-06-14 RX ADMIN — Medication 1: at 08:26

## 2018-06-14 RX ADMIN — Medication 7 UNIT(S): at 17:22

## 2018-06-14 NOTE — PROGRESS NOTE ADULT - PROBLEM SELECTOR PROBLEM 1
Severe sepsis
Severe sepsis
COPD (chronic obstructive pulmonary disease)
COPD (chronic obstructive pulmonary disease)
Diabetes Mellitus, Type II
Diabetes Mellitus, Type II
Severe sepsis
Sepsis due to pneumonia
Sepsis due to pneumonia

## 2018-06-14 NOTE — PROGRESS NOTE ADULT - PROBLEM SELECTOR PROBLEM 6
CAD (Coronary Artery Disease)
Hypertension

## 2018-06-14 NOTE — PROGRESS NOTE ADULT - PROBLEM SELECTOR PLAN 8
asymptomatic   outpatient follow up with vascular attending after discharge
asymptomatic   outpatient follow up with vascular attending after discharge
[facility]

## 2018-06-14 NOTE — PROGRESS NOTE ADULT - RS GEN PE MLT RESP DETAILS PC
respirations non-labored/clear to auscultation bilaterally/diminished breath sounds, L/diminished breath sounds, R
diminished breath sounds, R/diminished breath sounds, L/respirations non-labored

## 2018-06-14 NOTE — PROGRESS NOTE ADULT - ATTENDING COMMENTS
discussed with patient in detail, all questions answered  d/w pulmonary attending
discussed with patient in detail   discussed with wife at bedside in detail, all questions answered
check venous dopplers bl le
check venous dopplers bl le  6/14: dopplers BL LE pending!
discussed with patient in detail, all questions answered  discussed with wife at bedside in detail  PT evaluation noted  home PT
Ney Regan  Attending Physician   Division of Infectious Disease  Pager #[phone]  After 5pm/weekend or no response, call #[phone]
Ney Regan  Attending Physician   Division of Infectious Disease  Pager #[phone]  After 5pm/weekend or no response, call #[phone]

## 2018-06-14 NOTE — PROGRESS NOTE ADULT - PROVIDER SPECIALTY LIST ADULT
CCU
Cardiology
Endocrinology
Endocrinology
Infectious Disease
Infectious Disease
Internal Medicine
Nephrology
Pulmonology
Pulmonology
Cardiology
Internal Medicine
Internal Medicine

## 2018-06-14 NOTE — DIETITIAN INITIAL EVALUATION ADULT. - SOURCE
patient/Medical records, previous RD note, pt discussed during multidisciplinary rounds this morning

## 2018-06-14 NOTE — PROGRESS NOTE ADULT - PROBLEM SELECTOR PLAN 3
continue O2  no need for steroids per pulmonary
stable  no need for steroids per pulmonary
Continue treatment, pul/primary team FU
Continue treatment, pul/primary team FU
needs to be followed  up ? reactive:
needs to be followed  up ? reactive:  6/14: pt must be followed up for these lymphadenopathy:
uncontrolled DM Type 2   endocrinologist evaluation  will follow recommendations  continue finger sticks with short acting insulin sliding scale
-cont to trend
-cont to trend  -resolved

## 2018-06-14 NOTE — DIETITIAN INITIAL EVALUATION ADULT. - ENERGY NEEDS
Height: 71 inches, Weight: 218 pounds  BMI: 30.4 kg/m2 IBW: 172 pounds (+/-10%), %IBW: 127%  Pertinent Info: Per chart, 79 y/o male with PMH of T2DM, COPD, CAD s/p CABG (2004), HTN, HLD, admitted with sepsis 2/2 PNA, tachycardia and hypotension, possible D/C home today.  No edema, no pressure ulcers noted at this time.

## 2018-06-14 NOTE — PROGRESS NOTE ADULT - PROBLEM SELECTOR PROBLEM 4
Diabetes mellitus
Hypertension
Hypertension
MERRILL (acute kidney injury)
Pneumonia of right middle lobe due to infectious organism
Pneumonia of right middle lobe due to infectious organism

## 2018-06-14 NOTE — PROGRESS NOTE ADULT - PROBLEM SELECTOR PROBLEM 2
Pneumonia
Pneumonia
CAD (Coronary Artery Disease)
CAD (Coronary Artery Disease)
COPD (chronic obstructive pulmonary disease)
Pneumonia
Pneumonia
Fever
Fever

## 2018-06-14 NOTE — DIETITIAN INITIAL EVALUATION ADULT. - ORAL INTAKE PTA
Pt reports wife prepares healthy well balanced meals at home, reports good appetite and po intakes PTA/good

## 2018-06-14 NOTE — CHART NOTE - NSCHARTNOTEFT_GEN_A_CORE
Asked by PMD to expedite discharge home after IV ceftriaxone today; Spoke with Endocrine; Pt to be discharged back on Glipizide and metformin; Pt to complete 3 more days of PO Ceftin. Wife and pt agrees to plan.

## 2018-06-14 NOTE — PROGRESS NOTE ADULT - PROBLEM SELECTOR PLAN 1
much improved  will continue to monitor
on admission  now resolved
Will continue current insulin regimen for now. Will continue monitoring FS, log, will Follow up.  Patient counseled for compliance with consistent low carb diet.
Will continue current insulin regimen for now. Will continue monitoring FS, log, will Follow up.  Patient counseled for compliance with consistent low carb diet.
cont symbicort: no wheezing: cont o2
cont symbicort: no wheezing: cont o2  6/14: Add spiriva with Symbicort:
resolving  continue antibiotics ceftriaxone IV and azithromycin IV  pulmonary evaluation noted  ID evaluation called by me  clinically improving already
-seems improved  -cont abx
-seems improved  -cont abx  -day 4 of 7  -can change to ceftin 500 mg po BID x 3 days from 6/15  -potential side effects of abx explained including GI, Cdiff, etc.

## 2018-06-14 NOTE — PROGRESS NOTE ADULT - PROBLEM SELECTOR PLAN 4
uncontrolled DM Type 2   will continue to follow endocrinologist recommendations  continue finger sticks with short acting insulin sliding scale
On meds primary team following up
On meds primary team following up
discharge meds per endocrinologist
improved  will continue to monitor   renal help appreciated
reasonable: cont to follow
reasonable: cont to follow
-as above
-as above

## 2018-06-14 NOTE — PROGRESS NOTE ADULT - PROBLEM SELECTOR PLAN 2
bilateral LL pneumonia   on CT  continue antibiotics  defervescing well  speech and swallow evaluation pending to r/o aspiration
d/w ID   OK for discharge on po ceftin 500 BID x 3 days  give today's dose of ceftriaxone IV before discharge   speech and swallow evaluation noted  no intervention at this time
On medications, monitored and followed up by primary/cardiology team
On medications, monitored and followed up by primary/cardiology team
cont antibtiocs:
cont antibtiocs:  6/14: finish 5 days of zithromax as well as 7 days of ceftriaxone:
continue O2  no need for steroids per pulmonary
-resolved
-resolved

## 2018-06-14 NOTE — PROGRESS NOTE ADULT - SUBJECTIVE AND OBJECTIVE BOX
CARDIOLOGY ATTENDING    no tele    no palpitations, no syncope, no angina    acetaminophen   Tablet. 650 milliGRAM(s) Oral every 6 hours PRN  aspirin enteric coated 81 milliGRAM(s) Oral daily  atorvastatin 40 milliGRAM(s) Oral at bedtime  azithromycin   Tablet 500 milliGRAM(s) Oral daily  buDESOnide 160 MICROgram(s)/formoterol 4.5 MICROgram(s) Inhaler 2 Puff(s) Inhalation two times a day  cefTRIAXone   IVPB 1 Gram(s) IV Intermittent every 24 hours  clopidogrel Tablet 75 milliGRAM(s) Oral daily  dextrose 40% Gel 15 Gram(s) Oral once PRN  dextrose 40% Gel 15 Gram(s) Oral once PRN  dextrose 5%. 1000 milliLiter(s) IV Continuous <Continuous>  dextrose 5%. 1000 milliLiter(s) IV Continuous <Continuous>  dextrose 50% Injectable 12.5 Gram(s) IV Push once  dextrose 50% Injectable 25 Gram(s) IV Push once  dextrose 50% Injectable 25 Gram(s) IV Push once  dextrose 50% Injectable 12.5 Gram(s) IV Push once  dextrose 50% Injectable 25 Gram(s) IV Push once  dextrose 50% Injectable 25 Gram(s) IV Push once  glucagon  Injectable 1 milliGRAM(s) IntraMuscular once PRN  glucagon  Injectable 1 milliGRAM(s) IntraMuscular once PRN  heparin  Injectable 5000 Unit(s) SubCutaneous every 12 hours  insulin glargine Injectable (LANTUS) 16 Unit(s) SubCutaneous at bedtime  insulin lispro (HumaLOG) corrective regimen sliding scale   SubCutaneous three times a day before meals  insulin lispro (HumaLOG) corrective regimen sliding scale   SubCutaneous at bedtime  insulin lispro Injectable (HumaLOG) 7 Unit(s) SubCutaneous three times a day before meals  multivitamin/minerals 1 Tablet(s) Oral daily  tamsulosin 0.4 milliGRAM(s) Oral at bedtime                            11.1   13.50 )-----------( 278      ( 13 Jun 2018 09:35 )             37.2       06-13    137  |  100  |  27<H>  ----------------------------<  170<H>  4.3   |  27  |  1.28    Ca    9.8      13 Jun 2018 06:50    TPro  7.8  /  Alb  3.7  /  TBili  0.6  /  DBili  x   /  AST  36  /  ALT  15  /  AlkPhos  101  06-11      CARDIAC MARKERS ( 12 Jun 2018 01:21 )  x     / 0.02 ng/mL / x     / x     / x      CARDIAC MARKERS ( 11 Jun 2018 20:53 )  x     / 0.02 ng/mL / x     / x     / x      CARDIAC MARKERS ( 11 Jun 2018 19:41 )  x     / 0.02 ng/mL / 48 U/L / x     / 3.0 ng/mL  CARDIAC MARKERS ( 11 Jun 2018 14:42 )  x     / 0.03 ng/mL / 82 U/L / x     / 3.4 ng/mL        T(C): 36.4 (06-13-18 @ 05:09), Max: 36.9 (06-12-18 @ 19:57)  HR: 90 (06-13-18 @ 11:50) (90 - 99)  BP: 119/74 (06-13-18 @ 11:50) (108/70 - 120/72)  RR: 17 (06-13-18 @ 05:09) (17 - 20)  SpO2: 98% (06-13-18 @ 11:50) (94% - 98%)  Wt(kg): --    no JVD  RRR, no murmurs  CTAB  soft nt/nd  no c/c/e    repeat echo pending    A/P) 77 y/o male PMH CAD s/p CABG (2004) and DM admitted with tachycardia (sinus) in setting of fever. A fever workup is in progress. He denies palpitations nor syncope, but has been reporting atypical chest pain. Echo/NST in July 2017 was unremarkable    - f/u echo to assure LVEF is still normal  - Abx per med
Patient is a 78y old  Male who presents with a chief complaint of SOB (13 Jun 2018 10:23)    SUBJECTIVE / OVERNIGHT EVENTS: no overnight events  doing well    ROS:  Resp: No cough no sputum production  CVS: No chest pain no palpitations no orthopnea  GI: no N/V/D  : no dysuria, no hematuria  Neuro: no weakness no paresthesias  Heme: No petechiae no easy bruising  Msk: No joint pain no swelling  Skin: No rash no itching        MEDICATIONS  (STANDING):  aspirin enteric coated 81 milliGRAM(s) Oral daily  atorvastatin 40 milliGRAM(s) Oral at bedtime  azithromycin   Tablet 500 milliGRAM(s) Oral daily  buDESOnide 160 MICROgram(s)/formoterol 4.5 MICROgram(s) Inhaler 2 Puff(s) Inhalation two times a day  cefTRIAXone   IVPB 1 Gram(s) IV Intermittent every 24 hours  clopidogrel Tablet 75 milliGRAM(s) Oral daily  dextrose 5%. 1000 milliLiter(s) (50 mL/Hr) IV Continuous <Continuous>  dextrose 5%. 1000 milliLiter(s) (50 mL/Hr) IV Continuous <Continuous>  dextrose 50% Injectable 12.5 Gram(s) IV Push once  dextrose 50% Injectable 25 Gram(s) IV Push once  dextrose 50% Injectable 25 Gram(s) IV Push once  dextrose 50% Injectable 12.5 Gram(s) IV Push once  dextrose 50% Injectable 25 Gram(s) IV Push once  dextrose 50% Injectable 25 Gram(s) IV Push once  heparin  Injectable 5000 Unit(s) SubCutaneous every 12 hours  insulin glargine Injectable (LANTUS) 16 Unit(s) SubCutaneous at bedtime  insulin lispro (HumaLOG) corrective regimen sliding scale   SubCutaneous three times a day before meals  insulin lispro (HumaLOG) corrective regimen sliding scale   SubCutaneous at bedtime  insulin lispro Injectable (HumaLOG) 7 Unit(s) SubCutaneous three times a day before meals  multivitamin/minerals 1 Tablet(s) Oral daily  tamsulosin 0.4 milliGRAM(s) Oral at bedtime  tiotropium 18 MICROgram(s) Capsule 1 Capsule(s) Inhalation daily    MEDICATIONS  (PRN):  acetaminophen   Tablet. 650 milliGRAM(s) Oral every 6 hours PRN Moderate Pain (4 - 6)  dextrose 40% Gel 15 Gram(s) Oral once PRN Blood Glucose LESS THAN 70 milliGRAM(s)/deciliter  dextrose 40% Gel 15 Gram(s) Oral once PRN Blood Glucose LESS THAN 70 milliGRAM(s)/deciliter  glucagon  Injectable 1 milliGRAM(s) IntraMuscular once PRN Glucose LESS THAN 70 milligrams/deciliter  glucagon  Injectable 1 milliGRAM(s) IntraMuscular once PRN Glucose LESS THAN 70 milligrams/deciliter        CAPILLARY BLOOD GLUCOSE      POCT Blood Glucose.: 169 mg/dL (14 Jun 2018 08:03)  POCT Blood Glucose.: 224 mg/dL (13 Jun 2018 21:30)  POCT Blood Glucose.: 122 mg/dL (13 Jun 2018 16:51)    I&O's Summary    13 Jun 2018 07:01  -  14 Jun 2018 07:00  --------------------------------------------------------  IN: 840 mL / OUT: 1150 mL / NET: -310 mL        Vital Signs Last 24 Hrs  T(C): 37.5 (14 Jun 2018 05:01), Max: 37.5 (14 Jun 2018 05:01)  T(F): 99.5 (14 Jun 2018 05:01), Max: 99.5 (14 Jun 2018 05:01)  HR: 107 (14 Jun 2018 05:01) (100 - 107)  BP: 106/67 (14 Jun 2018 05:01) (106/67 - 125/73)  BP(mean): --  RR: 18 (14 Jun 2018 05:01) (18 - 18)  SpO2: 95% (14 Jun 2018 05:01) (94% - 96%)    PHYSICAL EXAM: vital signs as above  in no apparent distress  HEENT: VIOLETA EOMI  Neck: Supple, no JVD, no thyromegaly  Lungs: decreased breath sounds at bases   CVS: S1 S2 soft ejection systolic murmur best heard at left sternal border   Abdomen: no tenderness, no organomegaly, BS present  Neuro: AO x 3 no focal weakness, no sensory abnormalities  Psych: appropriate affect  Skin: warm, dry  Ext: no cyanosis or clubbing, no edema  Msk: no joint swelling or deformities  Back: no CVA tenderness, no kyphosis/scoliosis    LABS:                        12.2   9.63  )-----------( 318      ( 14 Jun 2018 08:18 )             38.8     06-13    137  |  100  |  27<H>  ----------------------------<  170<H>  4.3   |  27  |  1.28    Ca    9.8      13 Jun 2018 06:50                  All consultant(s) notes reviewed and care discussed with other providers    Contact Number, Dr Tineo [phone]
Patient is a 78y old  Male who presents with a chief complaint of SOB (2018 10:23)      SUBJECTIVE / OVERNIGHT EVENTS: no overnight events  states feels better    ROS:  Resp: No cough no sputum production  CVS: No chest pain no palpitations no orthopnea  GI: no N/V/D  : no dysuria, no hematuria  Neuro: no weakness no paresthesias  Heme: No petechiae no easy bruising  Msk: No joint pain no swelling  Skin: No rash no itching        MEDICATIONS  (STANDING):  aspirin enteric coated 81 milliGRAM(s) Oral daily  atorvastatin 40 milliGRAM(s) Oral at bedtime  azithromycin   Tablet 500 milliGRAM(s) Oral daily  buDESOnide 160 MICROgram(s)/formoterol 4.5 MICROgram(s) Inhaler 2 Puff(s) Inhalation two times a day  cefTRIAXone   IVPB 1 Gram(s) IV Intermittent every 24 hours  clopidogrel Tablet 75 milliGRAM(s) Oral daily  dextrose 5%. 1000 milliLiter(s) (50 mL/Hr) IV Continuous <Continuous>  dextrose 5%. 1000 milliLiter(s) (50 mL/Hr) IV Continuous <Continuous>  dextrose 50% Injectable 12.5 Gram(s) IV Push once  dextrose 50% Injectable 25 Gram(s) IV Push once  dextrose 50% Injectable 25 Gram(s) IV Push once  dextrose 50% Injectable 12.5 Gram(s) IV Push once  dextrose 50% Injectable 25 Gram(s) IV Push once  dextrose 50% Injectable 25 Gram(s) IV Push once  heparin  Injectable 5000 Unit(s) SubCutaneous every 12 hours  insulin glargine Injectable (LANTUS) 16 Unit(s) SubCutaneous at bedtime  insulin lispro (HumaLOG) corrective regimen sliding scale   SubCutaneous three times a day before meals  insulin lispro (HumaLOG) corrective regimen sliding scale   SubCutaneous at bedtime  insulin lispro Injectable (HumaLOG) 7 Unit(s) SubCutaneous three times a day before meals  multivitamin/minerals 1 Tablet(s) Oral daily  tamsulosin 0.4 milliGRAM(s) Oral at bedtime    MEDICATIONS  (PRN):  acetaminophen   Tablet. 650 milliGRAM(s) Oral every 6 hours PRN Moderate Pain (4 - 6)  dextrose 40% Gel 15 Gram(s) Oral once PRN Blood Glucose LESS THAN 70 milliGRAM(s)/deciliter  dextrose 40% Gel 15 Gram(s) Oral once PRN Blood Glucose LESS THAN 70 milliGRAM(s)/deciliter  glucagon  Injectable 1 milliGRAM(s) IntraMuscular once PRN Glucose LESS THAN 70 milligrams/deciliter  glucagon  Injectable 1 milliGRAM(s) IntraMuscular once PRN Glucose LESS THAN 70 milligrams/deciliter        CAPILLARY BLOOD GLUCOSE      POCT Blood Glucose.: 162 mg/dL (2018 07:35)  POCT Blood Glucose.: 214 mg/dL (2018 22:06)  POCT Blood Glucose.: 195 mg/dL (2018 16:39)  POCT Blood Glucose.: 331 mg/dL (2018 12:43)    I&O's Summary    2018 07:01  -  2018 07:00  --------------------------------------------------------  IN: 360 mL / OUT: 400 mL / NET: -40 mL    2018 07:01  -  2018 11:27  --------------------------------------------------------  IN: 300 mL / OUT: 300 mL / NET: 0 mL        Vital Signs Last 24 Hrs  T(C): 36.4 (2018 05:09), Max: 36.9 (2018 19:57)  T(F): 97.6 (2018 05:09), Max: 98.4 (2018 19:57)  HR: 98 (2018 05:09) (91 - 99)  BP: 115/68 (2018 05:09) (108/70 - 120/72)  BP(mean): --  RR: 17 (2018 05:09) (17 - 20)  SpO2: 98% (2018 05:09) (94% - 98%)    PHYSICAL EXAM: vital signs as above  in no apparent distress  HEENT: VIOLETA EOMI  Neck: Supple, no JVD, no thyromegaly  Lungs: decreased breath sounds at bases   CVS: S1 S2 soft ejection systolic murmur best heard at left sternal border   Abdomen: no tenderness, no organomegaly, BS present  Neuro: AO x 3 no focal weakness, no sensory abnormalities  Psych: appropriate affect  Skin: warm, dry  Ext: no cyanosis or clubbing, no edema  Msk: no joint swelling or deformities  Back: no CVA tenderness, no kyphosis/scoliosis    LABS:                        11.1   13.50 )-----------( 278      ( 2018 09:35 )             37.2     06-13    137  |  100  |  27<H>  ----------------------------<  170<H>  4.3   |  27  |  1.28    Ca    9.8      2018 06:50    TPro  7.8  /  Alb  3.7  /  TBili  0.6  /  DBili  x   /  AST  36  /  ALT  15  /  AlkPhos  101  06-11    PT/INR - ( 2018 14:42 )   PT: 12.4 sec;   INR: 1.14 ratio         PTT - ( 2018 14:42 )  PTT:27.5 sec  CARDIAC MARKERS ( 2018 01:21 )  x     / 0.02 ng/mL / x     / x     / x      CARDIAC MARKERS ( 2018 20:53 )  x     / 0.02 ng/mL / x     / x     / x      CARDIAC MARKERS ( 2018 19:41 )  x     / 0.02 ng/mL / 48 U/L / x     / 3.0 ng/mL  CARDIAC MARKERS ( 2018 14:42 )  x     / 0.03 ng/mL / 82 U/L / x     / 3.4 ng/mL      Urinalysis Basic - ( 2018 05:00 )    Color: PL Yellow / Appearance: Clear / S.023 / pH: x  Gluc: x / Ketone: Negative  / Bili: Negative / Urobili: Negative   Blood: x / Protein: Negative / Nitrite: Negative   Leuk Esterase: Negative / RBC: x / WBC x   Sq Epi: x / Non Sq Epi: x / Bacteria: x          All consultant(s) notes reviewed and care discussed with other providers    Contact Number, Dr Tineo [phone]
CARDIOLOGY     no tele    no palpitations, no syncope, no angina      MEDICATIONS  (STANDING):  aspirin enteric coated 81 milliGRAM(s) Oral daily  atorvastatin 40 milliGRAM(s) Oral at bedtime  azithromycin   Tablet 500 milliGRAM(s) Oral daily  buDESOnide 160 MICROgram(s)/formoterol 4.5 MICROgram(s) Inhaler 2 Puff(s) Inhalation two times a day  cefuroxime   Tablet 500 milliGRAM(s) Oral every 12 hours  clopidogrel Tablet 75 milliGRAM(s) Oral daily  dextrose 5%. 1000 milliLiter(s) (50 mL/Hr) IV Continuous <Continuous>  dextrose 5%. 1000 milliLiter(s) (50 mL/Hr) IV Continuous <Continuous>  dextrose 50% Injectable 12.5 Gram(s) IV Push once  dextrose 50% Injectable 25 Gram(s) IV Push once  dextrose 50% Injectable 25 Gram(s) IV Push once  dextrose 50% Injectable 12.5 Gram(s) IV Push once  dextrose 50% Injectable 25 Gram(s) IV Push once  dextrose 50% Injectable 25 Gram(s) IV Push once  heparin  Injectable 5000 Unit(s) SubCutaneous every 12 hours  insulin glargine Injectable (LANTUS) 16 Unit(s) SubCutaneous at bedtime  insulin lispro (HumaLOG) corrective regimen sliding scale   SubCutaneous three times a day before meals  insulin lispro (HumaLOG) corrective regimen sliding scale   SubCutaneous at bedtime  insulin lispro Injectable (HumaLOG) 7 Unit(s) SubCutaneous three times a day before meals  multivitamin/minerals 1 Tablet(s) Oral daily  tamsulosin 0.4 milliGRAM(s) Oral at bedtime  theophylline ER (24 Hour) 400 milliGRAM(s) Oral daily  tiotropium 18 MICROgram(s) Capsule 1 Capsule(s) Inhalation daily    MEDICATIONS  (PRN):  acetaminophen   Tablet. 650 milliGRAM(s) Oral every 6 hours PRN Moderate Pain (4 - 6)  dextrose 40% Gel 15 Gram(s) Oral once PRN Blood Glucose LESS THAN 70 milliGRAM(s)/deciliter  dextrose 40% Gel 15 Gram(s) Oral once PRN Blood Glucose LESS THAN 70 milliGRAM(s)/deciliter  glucagon  Injectable 1 milliGRAM(s) IntraMuscular once PRN Glucose LESS THAN 70 milligrams/deciliter  glucagon  Injectable 1 milliGRAM(s) IntraMuscular once PRN Glucose LESS THAN 70 milligrams/deciliter      LABS:                        12.2   9.63  )-----------( 318      ( 14 Jun 2018 08:18 )             38.8     Hemoglobin: 12.2 g/dL (06-14 @ 08:18)  Hemoglobin: 11.1 g/dL (06-13 @ 09:35)  Hemoglobin: 11.3 g/dL (06-12 @ 07:50)  Hemoglobin: 13.6 g/dL (06-11 @ 14:42)    06-13    137  |  100  |  27<H>  ----------------------------<  170<H>  4.3   |  27  |  1.28    Ca    9.8      13 Jun 2018 06:50      Creatinine Trend: 1.28<--, 1.54<--, 1.81<--     CARDIAC MARKERS ( 12 Jun 2018 01:21 )  x     / 0.02 ng/mL / x     / x     / x      CARDIAC MARKERS ( 11 Jun 2018 20:53 )  x     / 0.02 ng/mL / x     / x     / x      CARDIAC MARKERS ( 11 Jun 2018 19:41 )  x     / 0.02 ng/mL / 48 U/L / x     / 3.0 ng/mL        PHYSICAL EXAM  Vital Signs Last 24 Hrs  T(C): 37.5 (14 Jun 2018 05:01), Max: 37.5 (14 Jun 2018 05:01)  T(F): 99.5 (14 Jun 2018 05:01), Max: 99.5 (14 Jun 2018 05:01)  HR: 107 (14 Jun 2018 05:01) (100 - 107)  BP: 106/67 (14 Jun 2018 05:01) (106/67 - 125/73)  BP(mean): --  RR: 20 (14 Jun 2018 14:34) (18 - 20)  SpO2: 84% (14 Jun 2018 14:34) (84% - 96%)      no JVD  RRR, no murmurs  CTAB  soft nt/nd  no c/c/e     Transthoracic Echocardiogram (06.14.18 @ 08:58) >  Conclusions:  1. Normal left ventricular internal dimensions and wall  thicknesses.  2. Endocardium not well visualized; grossly normal left  ventricular systolic function. Septal motion consistent  with prior cardiac surgery.  3. The right ventricle is not well visualized; grossly  normal right ventricular systolic function.  *** Compared with echocardiogram of 8/26/2011, no  significant changes noted.        repeat echo pending    A/P) 77 y/o male PMH CAD s/p CABG (2004) and DM admitted with tachycardia (sinus) in setting of fever thought to be secondary to PNA.  He denies palpitations nor syncope, but has been reporting atypical chest pain. Echo/NST in July 2017 was unremarkable. The patient has ruled out for ACS.     - repeat TTE with normal LV function   - chest pain likely secondary to PNA - non anginal in nature   - blood cx negative   - Abx per med  - patient h/o cad/pad - on asa/plavix /statin  - f/u with Dr Rod on  August 22 at 1030am
Chief complaint  Patient is a 78y old  Male who presents with a chief complaint of SOB (13 Jun 2018 10:23)   Review of systems  Patient in bed, looks comfortable, no fever, no hypoglycemia.    Labs and Fingersticks  CAPILLARY BLOOD GLUCOSE      POCT Blood Glucose.: 122 mg/dL (13 Jun 2018 16:51)  POCT Blood Glucose.: 150 mg/dL (13 Jun 2018 11:47)  POCT Blood Glucose.: 162 mg/dL (13 Jun 2018 07:35)  POCT Blood Glucose.: 214 mg/dL (12 Jun 2018 22:06)      Anion Gap, Serum: 10 (06-13 @ 06:50)  Anion Gap, Serum: 13 (06-12 @ 05:38)    Hemoglobin A1C, Whole Blood: 6.2 <H> (06-12 @ 07:50)    Calcium, Total Serum: 9.8 (06-13 @ 06:50)  Calcium, Total Serum: 9.4 (06-12 @ 05:38)          06-13    137  |  100  |  27<H>  ----------------------------<  170<H>  4.3   |  27  |  1.28    Ca    9.8      13 Jun 2018 06:50                          11.1   13.50 )-----------( 278      ( 13 Jun 2018 09:35 )             37.2     Medications  MEDICATIONS  (STANDING):  aspirin enteric coated 81 milliGRAM(s) Oral daily  atorvastatin 40 milliGRAM(s) Oral at bedtime  azithromycin   Tablet 500 milliGRAM(s) Oral daily  buDESOnide 160 MICROgram(s)/formoterol 4.5 MICROgram(s) Inhaler 2 Puff(s) Inhalation two times a day  cefTRIAXone   IVPB 1 Gram(s) IV Intermittent every 24 hours  clopidogrel Tablet 75 milliGRAM(s) Oral daily  dextrose 5%. 1000 milliLiter(s) (50 mL/Hr) IV Continuous <Continuous>  dextrose 5%. 1000 milliLiter(s) (50 mL/Hr) IV Continuous <Continuous>  dextrose 50% Injectable 12.5 Gram(s) IV Push once  dextrose 50% Injectable 25 Gram(s) IV Push once  dextrose 50% Injectable 25 Gram(s) IV Push once  dextrose 50% Injectable 12.5 Gram(s) IV Push once  dextrose 50% Injectable 25 Gram(s) IV Push once  dextrose 50% Injectable 25 Gram(s) IV Push once  heparin  Injectable 5000 Unit(s) SubCutaneous every 12 hours  insulin glargine Injectable (LANTUS) 16 Unit(s) SubCutaneous at bedtime  insulin lispro (HumaLOG) corrective regimen sliding scale   SubCutaneous three times a day before meals  insulin lispro (HumaLOG) corrective regimen sliding scale   SubCutaneous at bedtime  insulin lispro Injectable (HumaLOG) 7 Unit(s) SubCutaneous three times a day before meals  multivitamin/minerals 1 Tablet(s) Oral daily  tamsulosin 0.4 milliGRAM(s) Oral at bedtime      Physical Exam  General: Patient comfortable in bed  Vital Signs Last 12 Hrs  T(F): 99.1 (06-13-18 @ 14:50), Max: 99.1 (06-13-18 @ 14:50)  HR: 104 (06-13-18 @ 14:50) (90 - 104)  BP: 117/71 (06-13-18 @ 14:50) (117/71 - 119/74)  BP(mean): --  RR: 18 (06-13-18 @ 14:50) (18 - 18)  SpO2: 94% (06-13-18 @ 14:50) (94% - 98%)  Neck: No palpable thyroid nodules.  CVS: S1S2, No murmurs  Respiratory: No wheezing, no crepitations  GI: Abdomen soft, bowel sounds positive  Musculoskeletal:  edema lower extremities.   Skin: No skin rashes, no ecchymosis    Diagnostics    Thyroid Stimulating Hormone, Serum: AM Sched. Collection: 13-Jun-2018 06:00 (06-12 @ 19:10)  Free Thyroxine, Serum: AM Sched. Collection: 13-Jun-2018 06:00 (06-12 @ 19:10)
Chief complaint  Patient is a 78y old  Male who presents with a chief complaint of SOB (13 Jun 2018 10:23)   Review of systems  Patient in bed, looks comfortable, no fever, still SOB feeling better, no hypoglycemia.    Labs and Fingersticks  CAPILLARY BLOOD GLUCOSE      POCT Blood Glucose.: 169 mg/dL (14 Jun 2018 08:03)  POCT Blood Glucose.: 224 mg/dL (13 Jun 2018 21:30)  POCT Blood Glucose.: 122 mg/dL (13 Jun 2018 16:51)  POCT Blood Glucose.: 150 mg/dL (13 Jun 2018 11:47)      Anion Gap, Serum: 10 (06-13 @ 06:50)      Calcium, Total Serum: 9.8 (06-13 @ 06:50)          06-13    137  |  100  |  27<H>  ----------------------------<  170<H>  4.3   |  27  |  1.28    Ca    9.8      13 Jun 2018 06:50                          12.2   9.63  )-----------( 318      ( 14 Jun 2018 08:18 )             38.8     Medications  MEDICATIONS  (STANDING):  aspirin enteric coated 81 milliGRAM(s) Oral daily  atorvastatin 40 milliGRAM(s) Oral at bedtime  azithromycin   Tablet 500 milliGRAM(s) Oral daily  buDESOnide 160 MICROgram(s)/formoterol 4.5 MICROgram(s) Inhaler 2 Puff(s) Inhalation two times a day  cefTRIAXone   IVPB 1 Gram(s) IV Intermittent every 24 hours  clopidogrel Tablet 75 milliGRAM(s) Oral daily  dextrose 5%. 1000 milliLiter(s) (50 mL/Hr) IV Continuous <Continuous>  dextrose 5%. 1000 milliLiter(s) (50 mL/Hr) IV Continuous <Continuous>  dextrose 50% Injectable 12.5 Gram(s) IV Push once  dextrose 50% Injectable 25 Gram(s) IV Push once  dextrose 50% Injectable 25 Gram(s) IV Push once  dextrose 50% Injectable 12.5 Gram(s) IV Push once  dextrose 50% Injectable 25 Gram(s) IV Push once  dextrose 50% Injectable 25 Gram(s) IV Push once  heparin  Injectable 5000 Unit(s) SubCutaneous every 12 hours  insulin glargine Injectable (LANTUS) 16 Unit(s) SubCutaneous at bedtime  insulin lispro (HumaLOG) corrective regimen sliding scale   SubCutaneous three times a day before meals  insulin lispro (HumaLOG) corrective regimen sliding scale   SubCutaneous at bedtime  insulin lispro Injectable (HumaLOG) 7 Unit(s) SubCutaneous three times a day before meals  multivitamin/minerals 1 Tablet(s) Oral daily  tamsulosin 0.4 milliGRAM(s) Oral at bedtime      Physical Exam  General: Patient comfortable in bed  Vital Signs Last 12 Hrs  T(F): 99.5 (06-14-18 @ 05:01), Max: 99.5 (06-14-18 @ 05:01)  HR: 107 (06-14-18 @ 05:01) (107 - 107)  BP: 106/67 (06-14-18 @ 05:01) (106/67 - 106/67)  BP(mean): --  RR: 18 (06-14-18 @ 05:01) (18 - 18)  SpO2: 95% (06-14-18 @ 05:01) (95% - 95%)  Neck: No palpable thyroid nodules.  CVS: S1S2, No murmurs  Respiratory: No wheezing, no crepitations  GI: Abdomen soft, bowel sounds positive  Musculoskeletal:  edema lower extremities.   Skin: No skin rashes, no ecchymosis    Diagnostics    Thyroid Stimulating Hormone, Serum: AM Sched. Collection: 13-Jun-2018 06:00 (06-12 @ 19:10)  Free Thyroxine, Serum: AM Sched. Collection: 13-Jun-2018 06:00 (06-12 @ 19:10)
NEPHROLOGY[facility] [phone]        Patient seen and examined in bed.  He felt well and offered no complaints        MEDICATIONS  (STANDING):  aspirin enteric coated 81 milliGRAM(s) Oral daily  atorvastatin 40 milliGRAM(s) Oral at bedtime  azithromycin   Tablet 500 milliGRAM(s) Oral daily  buDESOnide 160 MICROgram(s)/formoterol 4.5 MICROgram(s) Inhaler 2 Puff(s) Inhalation two times a day  cefTRIAXone   IVPB 1 Gram(s) IV Intermittent every 24 hours  clopidogrel Tablet 75 milliGRAM(s) Oral daily  dextrose 5%. 1000 milliLiter(s) (50 mL/Hr) IV Continuous <Continuous>  dextrose 5%. 1000 milliLiter(s) (50 mL/Hr) IV Continuous <Continuous>  dextrose 50% Injectable 12.5 Gram(s) IV Push once  dextrose 50% Injectable 25 Gram(s) IV Push once  dextrose 50% Injectable 25 Gram(s) IV Push once  dextrose 50% Injectable 12.5 Gram(s) IV Push once  dextrose 50% Injectable 25 Gram(s) IV Push once  dextrose 50% Injectable 25 Gram(s) IV Push once  heparin  Injectable 5000 Unit(s) SubCutaneous every 12 hours  insulin glargine Injectable (LANTUS) 16 Unit(s) SubCutaneous at bedtime  insulin lispro (HumaLOG) corrective regimen sliding scale   SubCutaneous three times a day before meals  insulin lispro (HumaLOG) corrective regimen sliding scale   SubCutaneous at bedtime  insulin lispro Injectable (HumaLOG) 7 Unit(s) SubCutaneous three times a day before meals  multivitamin/minerals 1 Tablet(s) Oral daily  tamsulosin 0.4 milliGRAM(s) Oral at bedtime      VITAL:  T(C): , Max: 36.9 (18 @ 19:57)  T(F): , Max: 98.4 (18 @ 19:57)  HR: 98 (18 @ 05:09)  BP: 115/68 (18 @ 05:09)  BP(mean): --  RR: 17 (18 @ 05:09)  SpO2: 98% (18 @ 05:09)  Wt(kg): --    I and O's:     @ 07:01  -   @ 07:00  --------------------------------------------------------  IN: 360 mL / OUT: 400 mL / NET: -40 mL     @ 07:01  -   @ 10:17  --------------------------------------------------------  IN: 300 mL / OUT: 300 mL / NET: 0 mL      Height (cm): 180.34 ( @ 14:33)  Weight (kg): 99 ( 14:33)  BMI (kg/m2): 30.4 ( 14:33)  BSA (m2): 2.19 ( @ 14:33)    PHYSICAL EXAM:    Constitutional: NAD  HEENT: PERRLA    Neck:  No JVD  Respiratory: rare crackels at bases  Cardiovascular: S1 and S2  Gastrointestinal: BS+, soft, NT/ND  Extremities: No peripheral edema  Neurological: A/O x 3, no focal deficits  Psychiatric: Normal mood, normal affect  : No Gonzáles  Skin: No rashes  Access: Not applicable    LABS:                        11.3   10.14 )-----------( 267      ( 2018 07:50 )             37.4     06-13    137  |  100  |  27<H>  ----------------------------<  170<H>  4.3   |  27  |  1.28    Ca    9.8      2018 06:50    TPro  7.8  /  Alb  3.7  /  TBili  0.6  /  DBili  x   /  AST  36  /  ALT  15  /  AlkPhos  101  06-11          Urine Studies:  Urinalysis Basic - ( 2018 05:00 )    Color: PL Yellow / Appearance: Clear / S.023 / pH: x  Gluc: x / Ketone: Negative  / Bili: Negative / Urobili: Negative   Blood: x / Protein: Negative / Nitrite: Negative   Leuk Esterase: Negative / RBC: x / WBC x   Sq Epi: x / Non Sq Epi: x / Bacteria: x            RADIOLOGY & ADDITIONAL STUDIES:
[facility] [phone]        Patient seen and examined         MEDICATIONS  (STANDING):  aspirin enteric coated 81 milliGRAM(s) Oral daily  atorvastatin 40 milliGRAM(s) Oral at bedtime  azithromycin   Tablet 500 milliGRAM(s) Oral daily  buDESOnide 160 MICROgram(s)/formoterol 4.5 MICROgram(s) Inhaler 2 Puff(s) Inhalation two times a day  cefTRIAXone   IVPB 1 Gram(s) IV Intermittent every 24 hours  clopidogrel Tablet 75 milliGRAM(s) Oral daily  dextrose 5%. 1000 milliLiter(s) (50 mL/Hr) IV Continuous <Continuous>  dextrose 5%. 1000 milliLiter(s) (50 mL/Hr) IV Continuous <Continuous>  dextrose 50% Injectable 12.5 Gram(s) IV Push once  dextrose 50% Injectable 25 Gram(s) IV Push once  dextrose 50% Injectable 25 Gram(s) IV Push once  dextrose 50% Injectable 12.5 Gram(s) IV Push once  dextrose 50% Injectable 25 Gram(s) IV Push once  dextrose 50% Injectable 25 Gram(s) IV Push once  heparin  Injectable 5000 Unit(s) SubCutaneous every 12 hours  insulin lispro (HumaLOG) corrective regimen sliding scale   SubCutaneous three times a day before meals  insulin lispro (HumaLOG) corrective regimen sliding scale   SubCutaneous at bedtime  multivitamin/minerals 1 Tablet(s) Oral daily  tamsulosin 0.4 milliGRAM(s) Oral at bedtime      VITAL:  T(C): , Max: 36.8 (18 @ 16:33)  T(F): , Max: 98.3 (18 @ 16:33)  HR: 99 (18 @ 13:14)  BP: 118/72 (18 @ 13:14)  BP(mean): --  RR: 20 (18 @ 13:14)  SpO2: 97% (18 @ 13:14)  Wt(kg): --    I and O's:     @ 07:01  -   @ 07:00  --------------------------------------------------------  IN: 0 mL / OUT: 650 mL / NET: -650 mL          PHYSICAL EXAM:    Constitutional: NAD  HEENT: PERRLA    Neck:  No JVD  Respiratory: CTAB/L  Cardiovascular: S1 and S2  Gastrointestinal: BS+, soft, NT/ND  Extremities: No peripheral edema  Neurological: A/O x 3, no focal deficits  Psychiatric: Normal mood, normal affect  : No Gonzáles  Skin: No rashes  Access: Not applicable    LABS:                        13.6   17.8  )-----------( 236      ( 2018 14:42 )             41.7     06-12    138  |  97  |  33<H>  ----------------------------<  366<H>  4.8   |  28  |  1.54<H>    Ca    9.4      2018 05:38    TPro  7.8  /  Alb  3.7  /  TBili  0.6  /  DBili  x   /  AST  36  /  ALT  15  /  AlkPhos  101  06-11          Urine Studies:  Urinalysis Basic - ( 2018 05:00 )    Color: PL Yellow / Appearance: Clear / S.023 / pH: x  Gluc: x / Ketone: Negative  / Bili: Negative / Urobili: Negative   Blood: x / Protein: Negative / Nitrite: Negative   Leuk Esterase: Negative / RBC: x / WBC x   Sq Epi: x / Non Sq Epi: x / Bacteria: x            RADIOLOGY & ADDITIONAL STUDIES:
[facility] [phone]        Patient seen and examined in bed.  He was in good spirits        MEDICATIONS  (STANDING):  aspirin enteric coated 81 milliGRAM(s) Oral daily  atorvastatin 40 milliGRAM(s) Oral at bedtime  azithromycin   Tablet 500 milliGRAM(s) Oral daily  buDESOnide 160 MICROgram(s)/formoterol 4.5 MICROgram(s) Inhaler 2 Puff(s) Inhalation two times a day  cefuroxime   Tablet 500 milliGRAM(s) Oral every 12 hours  clopidogrel Tablet 75 milliGRAM(s) Oral daily  dextrose 5%. 1000 milliLiter(s) (50 mL/Hr) IV Continuous <Continuous>  dextrose 5%. 1000 milliLiter(s) (50 mL/Hr) IV Continuous <Continuous>  dextrose 50% Injectable 12.5 Gram(s) IV Push once  dextrose 50% Injectable 25 Gram(s) IV Push once  dextrose 50% Injectable 25 Gram(s) IV Push once  dextrose 50% Injectable 12.5 Gram(s) IV Push once  dextrose 50% Injectable 25 Gram(s) IV Push once  dextrose 50% Injectable 25 Gram(s) IV Push once  heparin  Injectable 5000 Unit(s) SubCutaneous every 12 hours  insulin glargine Injectable (LANTUS) 16 Unit(s) SubCutaneous at bedtime  insulin lispro (HumaLOG) corrective regimen sliding scale   SubCutaneous three times a day before meals  insulin lispro (HumaLOG) corrective regimen sliding scale   SubCutaneous at bedtime  insulin lispro Injectable (HumaLOG) 7 Unit(s) SubCutaneous three times a day before meals  multivitamin/minerals 1 Tablet(s) Oral daily  tamsulosin 0.4 milliGRAM(s) Oral at bedtime  theophylline ER (24 Hour) 400 milliGRAM(s) Oral daily  tiotropium 18 MICROgram(s) Capsule 1 Capsule(s) Inhalation daily      VITAL:  T(C): , Max: 37.5 (06-14-18 @ 05:01)  T(F): , Max: 99.5 (06-14-18 @ 05:01)  HR: 107 (06-14-18 @ 05:01)  BP: 106/67 (06-14-18 @ 05:01)  BP(mean): --  RR: 20 (06-14-18 @ 14:34)  SpO2: 84% (06-14-18 @ 14:34)  Wt(kg): --    I and O's:    06-13 @ 07:01  -  06-14 @ 07:00  --------------------------------------------------------  IN: 840 mL / OUT: 1150 mL / NET: -310 mL          PHYSICAL EXAM:    Constitutional: NAD  HEENT: PERRLA    Neck:  No JVD  Respiratory: CTAB/L  Cardiovascular: S1 and S2  Gastrointestinal: BS+, soft, NT/ND  Extremities: No peripheral edema  Neurological: A/O x 3, no focal deficits  Psychiatric: Normal mood, normal affect  : No Gonzáles  Skin: No rashes  Access: Not applicable    LABS:                        12.2   9.63  )-----------( 318      ( 14 Jun 2018 08:18 )             38.8     06-13    137  |  100  |  27<H>  ----------------------------<  170<H>  4.3   |  27  |  1.28    Ca    9.8      13 Jun 2018 06:50            Urine Studies:          RADIOLOGY & ADDITIONAL STUDIES:
Patient denies CP, SOB  Review of systems otherwise (-)  	  MEDICATIONS:  MEDICATIONS  (STANDING):  aspirin enteric coated 81 milliGRAM(s) Oral daily  atorvastatin 40 milliGRAM(s) Oral at bedtime  azithromycin   Tablet 500 milliGRAM(s) Oral daily  buDESOnide 160 MICROgram(s)/formoterol 4.5 MICROgram(s) Inhaler 2 Puff(s) Inhalation two times a day  cefTRIAXone   IVPB 1 Gram(s) IV Intermittent every 24 hours  clopidogrel Tablet 75 milliGRAM(s) Oral daily  dextrose 5%. 1000 milliLiter(s) (50 mL/Hr) IV Continuous <Continuous>  dextrose 5%. 1000 milliLiter(s) (50 mL/Hr) IV Continuous <Continuous>  dextrose 50% Injectable 12.5 Gram(s) IV Push once  dextrose 50% Injectable 25 Gram(s) IV Push once  dextrose 50% Injectable 25 Gram(s) IV Push once  dextrose 50% Injectable 12.5 Gram(s) IV Push once  dextrose 50% Injectable 25 Gram(s) IV Push once  dextrose 50% Injectable 25 Gram(s) IV Push once  heparin  Injectable 5000 Unit(s) SubCutaneous every 12 hours  insulin lispro (HumaLOG) corrective regimen sliding scale   SubCutaneous three times a day before meals  insulin lispro (HumaLOG) corrective regimen sliding scale   SubCutaneous at bedtime  insulin lispro Injectable (HumaLOG) 6 Unit(s) SubCutaneous three times a day before meals  insulin lispro protamine/insulin lispro 75/25 Injectable (HumaLOG MIX) 10 Unit(s) SubCutaneous two times a day before meals  multivitamin/minerals 1 Tablet(s) Oral daily  tamsulosin 0.4 milliGRAM(s) Oral at bedtime      LABS:	 	    CARDIAC MARKERS:  CARDIAC MARKERS ( 12 Jun 2018 01:21 )  x     / 0.02 ng/mL / x     / x     / x      CARDIAC MARKERS ( 11 Jun 2018 20:53 )  x     / 0.02 ng/mL / x     / x     / x      CARDIAC MARKERS ( 11 Jun 2018 19:41 )  x     / 0.02 ng/mL / 48 U/L / x     / 3.0 ng/mL  CARDIAC MARKERS ( 11 Jun 2018 14:42 )  x     / 0.03 ng/mL / 82 U/L / x     / 3.4 ng/mL                                11.3   10.14 )-----------( 267      ( 12 Jun 2018 07:50 )             37.4     Hemoglobin: 11.3 g/dL (06-12 @ 07:50)  Hemoglobin: 13.6 g/dL (06-11 @ 14:42)      06-12    138  |  97  |  33<H>  ----------------------------<  366<H>  4.8   |  28  |  1.54<H>    Ca    9.4      12 Jun 2018 05:38    TPro  7.8  /  Alb  3.7  /  TBili  0.6  /  DBili  x   /  AST  36  /  ALT  15  /  AlkPhos  101  06-11    Creatinine Trend: 1.54<--, 1.81<--    HgA1c: Hemoglobin A1C, Whole Blood: 6.2 % (06-12 @ 07:50)    TSH: Thyroid Stimulating Hormone, Serum: 0.43 uIU/mL (06-12 @ 07:58)        PHYSICAL EXAM:  T(C): 36.9 (06-12-18 @ 19:57), Max: 36.9 (06-12-18 @ 19:57)  HR: 91 (06-12-18 @ 19:57) (87 - 99)  BP: 108/70 (06-12-18 @ 19:57) (108/70 - 133/77)  RR: 18 (06-12-18 @ 19:57) (18 - 20)  SpO2: 94% (06-12-18 @ 19:57) (94% - 100%)  Wt(kg): --  I&O's Summary    11 Jun 2018 07:01  -  12 Jun 2018 07:00  --------------------------------------------------------  IN: 0 mL / OUT: 650 mL / NET: -650 mL    12 Jun 2018 07:01  -  12 Jun 2018 21:28  --------------------------------------------------------  IN: 360 mL / OUT: 0 mL / NET: 360 mL      Height (cm): 180.34 (06-12 @ 14:33)  Weight (kg): 99 (06-12 @ 14:33)  BMI (kg/m2): 30.4 (06-12 @ 14:33)  BSA (m2): 2.19 (06-12 @ 14:33)    HEENT:   Normal oral mucosa, PERRL, EOMI	  Lymphatic: No obvious lymphadenopathy , no edema  Cardiovascular: Normal S1 S2, No JVD, 1/6 GRISELDA murmur, Peripheral pulses palpable 2+ bilaterally  Respiratory: Lungs clear to auscultation, normal effort 	  Gastrointestinal:  Soft, Non-tender, + BS	  Skin: No rashes,  No cyanosis, warm to touch  Musculoskeletal: Normal range of motion, normal strength  Psychiatry:  Appropriate Mood & affect           ASSESSMENT/PLAN: 	78y Male 79 y/o male PMH CAD s/p CABG (2004) and DM admitted with tachycardia (sinus) in setting of fever. A fever workup is pending. He denies palpitations nor syncope, but has been reporting atypical chest pain. Echo/NST in July 2017 was unremarkable    - f/u echo  - Abx per med    Adan Gallardo MD, FACC
Patient is a 78y old  Male who presents with a chief complaint of SOB (13 Jun 2018 10:23)      Any change in ROS:   mild cough:    MEDICATIONS  (STANDING):  aspirin enteric coated 81 milliGRAM(s) Oral daily  atorvastatin 40 milliGRAM(s) Oral at bedtime  azithromycin   Tablet 500 milliGRAM(s) Oral daily  buDESOnide 160 MICROgram(s)/formoterol 4.5 MICROgram(s) Inhaler 2 Puff(s) Inhalation two times a day  cefTRIAXone   IVPB 1 Gram(s) IV Intermittent every 24 hours  clopidogrel Tablet 75 milliGRAM(s) Oral daily  dextrose 5%. 1000 milliLiter(s) (50 mL/Hr) IV Continuous <Continuous>  dextrose 5%. 1000 milliLiter(s) (50 mL/Hr) IV Continuous <Continuous>  dextrose 50% Injectable 12.5 Gram(s) IV Push once  dextrose 50% Injectable 25 Gram(s) IV Push once  dextrose 50% Injectable 25 Gram(s) IV Push once  dextrose 50% Injectable 12.5 Gram(s) IV Push once  dextrose 50% Injectable 25 Gram(s) IV Push once  dextrose 50% Injectable 25 Gram(s) IV Push once  heparin  Injectable 5000 Unit(s) SubCutaneous every 12 hours  insulin glargine Injectable (LANTUS) 16 Unit(s) SubCutaneous at bedtime  insulin lispro (HumaLOG) corrective regimen sliding scale   SubCutaneous three times a day before meals  insulin lispro (HumaLOG) corrective regimen sliding scale   SubCutaneous at bedtime  insulin lispro Injectable (HumaLOG) 7 Unit(s) SubCutaneous three times a day before meals  multivitamin/minerals 1 Tablet(s) Oral daily  tamsulosin 0.4 milliGRAM(s) Oral at bedtime    MEDICATIONS  (PRN):  acetaminophen   Tablet. 650 milliGRAM(s) Oral every 6 hours PRN Moderate Pain (4 - 6)  dextrose 40% Gel 15 Gram(s) Oral once PRN Blood Glucose LESS THAN 70 milliGRAM(s)/deciliter  dextrose 40% Gel 15 Gram(s) Oral once PRN Blood Glucose LESS THAN 70 milliGRAM(s)/deciliter  glucagon  Injectable 1 milliGRAM(s) IntraMuscular once PRN Glucose LESS THAN 70 milligrams/deciliter  glucagon  Injectable 1 milliGRAM(s) IntraMuscular once PRN Glucose LESS THAN 70 milligrams/deciliter    Vital Signs Last 24 Hrs  T(C): 37.5 (14 Jun 2018 05:01), Max: 37.5 (14 Jun 2018 05:01)  T(F): 99.5 (14 Jun 2018 05:01), Max: 99.5 (14 Jun 2018 05:01)  HR: 107 (14 Jun 2018 05:01) (90 - 107)  BP: 106/67 (14 Jun 2018 05:01) (106/67 - 125/73)  BP(mean): --  RR: 18 (14 Jun 2018 05:01) (18 - 18)  SpO2: 95% (14 Jun 2018 05:01) (94% - 98%)    I&O's Summary    13 Jun 2018 07:01  -  14 Jun 2018 07:00  --------------------------------------------------------  IN: 840 mL / OUT: 1150 mL / NET: -310 mL          Physical Exam:   GENERAL: NAD, well-groomed, well-developed  HEENT: VIOLETA/   Atraumatic, Normocephalic  ENMT: No tonsillar erythema, exudates, or enlargement; Moist mucous membranes, Good dentition, No lesions  NECK: Supple, No JVD, Normal thyroid  CHEST/LUNG: scattered crackles bilaterally:   CVS: Regular rate and rhythm; No murmurs, rubs, or gallops  GI: : Soft, Nontender, Nondistended; Bowel sounds present  NERVOUS SYSTEM:  Alert & Oriented X3  EXTREMITIES:  trace edema  LYMPH: No lymphadenopathy noted  SKIN: No rashes or lesions  ENDOCRINOLOGY: No Thyromegaly  PSYCH: Appropriate    Labs:  28, UNK                            12.2   9.63  )-----------( 318      ( 14 Jun 2018 08:18 )             38.8                         11.1   13.50 )-----------( 278      ( 13 Jun 2018 09:35 )             37.2                         11.3   10.14 )-----------( 267      ( 12 Jun 2018 07:50 )             37.4                         13.6   17.8  )-----------( 236      ( 11 Jun 2018 14:42 )             41.7     06-13    137  |  100  |  27<H>  ----------------------------<  170<H>  4.3   |  27  |  1.28  06-12    138  |  97  |  33<H>  ----------------------------<  366<H>  4.8   |  28  |  1.54<H>  06-11    136  |  96  |  28<H>  ----------------------------<  195<H>  4.9   |  26  |  1.81<H>    Ca    9.8      13 Jun 2018 06:50    TPro  7.8  /  Alb  3.7  /  TBili  0.6  /  DBili  x   /  AST  36  /  ALT  15  /  AlkPhos  101  06-11    CAPILLARY BLOOD GLUCOSE      POCT Blood Glucose.: 169 mg/dL (14 Jun 2018 08:03)  POCT Blood Glucose.: 224 mg/dL (13 Jun 2018 21:30)  POCT Blood Glucose.: 122 mg/dL (13 Jun 2018 16:51)  POCT Blood Glucose.: 150 mg/dL (13 Jun 2018 11:47)                  RECENT CULTURES:  06-12 @ 08:20 .Urine Clean Catch (Midstream)                Normal Urogenital shayy present    06-11 @ 20:57 .Blood Blood-Peripheral         < from: CT Chest No Cont (06.11.18 @ 18:11) >  PROCEDURE:   CT of the Chest was performed without intravenous contrast.   Intravenous contrast: None.    Reconstructions were performed in axial thin, axial maximum intensity   projection, sagittal and coronal planes.    COMPARISON: CT of the chest dated 1/22/2018.    FINDINGS:    LUNGS AND LARGE AIRWAYS: Mucoid impaction of several lower lobe bronchi.   Bilateral centrilobular emphysema. A 3 mm peripheral right upper lobe   calcified granuloma. Bilateral lower lobe parenchymal consolidation, new   from 1/22/2018 concerning for pneumonia.  PLEURA: No pleural effusion.  VESSELS: Mild atheromatous disease of the aorta.  HEART: Heart size is normal. No pericardial effusion. Aortic valvular and   coronary artery calcifications.  MEDIASTINUM AND FRANKLYN: A subcarinal lymph node measures 1.6 x 1.2 cm   (3:75), and is new.  CHEST WALL AND LOWER NECK: Status post sternotomy.  UPPER ABDOMEN: Within normal limits.  BONES: Mild degenerative changes of the visualized cervicothoracic spine.   Status post sternotomy.     IMPRESSION:    Bilateral lower lobe patchy consolidations concerning for pneumonia.   Follow-up to resolution is recommended.    Centrilobular emphysema.    Mildly enlarged subcarinal lymph node, which may be reactive.                LAITH GROSSMAN M.D., RADIOLOGY RESIDENT  This document has been electronically signed.  JEANNE GILL M.D., ATTENDING RADIOLOGIST  This document has been electronically signed. Jun 11 2018  6:44PM    < end of copied text >  < from: CT Chest No Cont (06.11.18 @ 18:11) >  PROCEDURE:   CT of the Chest was performed without intravenous contrast.   Intravenous contrast: None.    Reconstructions were performed in axial thin, axial maximum intensity   projection, sagittal and coronal planes.    COMPARISON: CT of the chest dated 1/22/2018.    FINDINGS:    LUNGS AND LARGE AIRWAYS: Mucoid impaction of several lower lobe bronchi.   Bilateral centrilobular emphysema. A 3 mm peripheral right upper lobe   calcified granuloma. Bilateral lower lobe parenchymal consolidation, new   from 1/22/2018 concerning for pneumonia.  PLEURA: No pleural effusion.  VESSELS: Mild atheromatous disease of the aorta.  HEART: Heart size is normal. No pericardial effusion. Aortic valvular and   coronary artery calcifications.  MEDIASTINUM AND FRANKLYN: A subcarinal lymph node measures 1.6 x 1.2 cm   (3:75), and is new.  CHEST WALL AND LOWER NECK: Status post sternotomy.  UPPER ABDOMEN: Within normal limits.  BONES: Mild degenerative changes of the visualized cervicothoracic spine.   Status post sternotomy.     IMPRESSION:    Bilateral lower lobe patchy consolidations concerning for pneumonia.   Follow-up to resolution is recommended.    Centrilobular emphysema.    Mildly enlarged subcarinal lymph node, which may be reactive.                LAITH GROSSMAN M.D., RADIOLOGY RESIDENT  This document has been electronically signed.  JEANNE GILL M.D., ATTENDING RADIOLOGIST  This document has been electronically signed. Jun 11 2018  6:44PM    < end of copied text >         No growth to date.          RESPIRATORY CULTURES:          Studies  Chest X-RAY  CT SCAN Chest   Venous Dopplers: LE:   CT Abdomen  Others
Patient is a 78y old  Male who presents with a chief complaint of SOB (2018 20:47)        SUBJECTIVE / OVERNIGHT EVENTS: no overnight events  states feels a little better     ROS:  Resp: No cough no sputum production  CVS: No chest pain no palpitations no orthopnea  GI: no N/V/D  : no dysuria, no hematuria  Neuro: no weakness no paresthesias  Heme: No petechiae no easy bruising  Msk: No joint pain no swelling  Skin: No rash no itching        MEDICATIONS  (STANDING):  aspirin enteric coated 81 milliGRAM(s) Oral daily  atorvastatin 40 milliGRAM(s) Oral at bedtime  azithromycin   Tablet 500 milliGRAM(s) Oral daily  buDESOnide 160 MICROgram(s)/formoterol 4.5 MICROgram(s) Inhaler 2 Puff(s) Inhalation two times a day  cefTRIAXone   IVPB 1 Gram(s) IV Intermittent every 24 hours  clopidogrel Tablet 75 milliGRAM(s) Oral daily  dextrose 5%. 1000 milliLiter(s) (50 mL/Hr) IV Continuous <Continuous>  dextrose 5%. 1000 milliLiter(s) (50 mL/Hr) IV Continuous <Continuous>  dextrose 50% Injectable 12.5 Gram(s) IV Push once  dextrose 50% Injectable 25 Gram(s) IV Push once  dextrose 50% Injectable 25 Gram(s) IV Push once  dextrose 50% Injectable 12.5 Gram(s) IV Push once  dextrose 50% Injectable 25 Gram(s) IV Push once  dextrose 50% Injectable 25 Gram(s) IV Push once  heparin  Injectable 5000 Unit(s) SubCutaneous every 12 hours  insulin lispro (HumaLOG) corrective regimen sliding scale   SubCutaneous three times a day before meals  insulin lispro (HumaLOG) corrective regimen sliding scale   SubCutaneous at bedtime  multivitamin/minerals 1 Tablet(s) Oral daily  tamsulosin 0.4 milliGRAM(s) Oral at bedtime    MEDICATIONS  (PRN):  dextrose 40% Gel 15 Gram(s) Oral once PRN Blood Glucose LESS THAN 70 milliGRAM(s)/deciliter  dextrose 40% Gel 15 Gram(s) Oral once PRN Blood Glucose LESS THAN 70 milliGRAM(s)/deciliter  glucagon  Injectable 1 milliGRAM(s) IntraMuscular once PRN Glucose LESS THAN 70 milligrams/deciliter  glucagon  Injectable 1 milliGRAM(s) IntraMuscular once PRN Glucose LESS THAN 70 milligrams/deciliter        CAPILLARY BLOOD GLUCOSE      POCT Blood Glucose.: 269 mg/dL (2018 08:32)  POCT Blood Glucose.: 371 mg/dL (2018 21:48)    I&O's Summary    2018 07:01  -  2018 07:00  --------------------------------------------------------  IN: 0 mL / OUT: 650 mL / NET: -650 mL        Vital Signs Last 24 Hrs  T(C): 36.4 (2018 07:15), Max: 38 (2018 14:20)  T(F): 97.6 (2018 07:15), Max: 100.4 (2018 14:20)  HR: 96 (2018 07:15) (87 - 124)  BP: 133/77 (2018 07:15) (98/62 - 133/77)  BP(mean): --  RR: 20 (2018 07:15) (18 - 23)  SpO2: 100% (2018 07:15) (95% - 100%)    PHYSICAL EXAM: vital signs as above  in no apparent distress  HEENT: VIOLETA EOMI  Neck: Supple, no JVD, no thyromegaly  Lungs: scattered expiratory wheeze, few crackles bases evident today  CVS: S1 S2 soft ejection systolic murmur best heard at left sternal border   Abdomen: no tenderness, no organomegaly, BS present  Neuro: AO x 3 no focal weakness, no sensory abnormalities  Psych: appropriate affect  Skin: warm, dry  Ext: no cyanosis or clubbing, no edema  Msk: no joint swelling or deformities  Back: no CVA tenderness, no kyphosis/scoliosis    LABS:                        13.6   17.8  )-----------( 236      ( 2018 14:42 )             41.7     06-12    138  |  97  |  33<H>  ----------------------------<  366<H>  4.8   |  28  |  1.54<H>    Ca    9.4      2018 05:38    TPro  7.8  /  Alb  3.7  /  TBili  0.6  /  DBili  x   /  AST  36  /  ALT  15  /  AlkPhos  101  06-11    PT/INR - ( 2018 14:42 )   PT: 12.4 sec;   INR: 1.14 ratio         PTT - ( 2018 14:42 )  PTT:27.5 sec  CARDIAC MARKERS ( 2018 01:21 )  x     / 0.02 ng/mL / x     / x     / x      CARDIAC MARKERS ( 2018 20:53 )  x     / 0.02 ng/mL / x     / x     / x      CARDIAC MARKERS ( 2018 19:41 )  x     / 0.02 ng/mL / 48 U/L / x     / 3.0 ng/mL  CARDIAC MARKERS ( 2018 14:42 )  x     / 0.03 ng/mL / 82 U/L / x     / 3.4 ng/mL      Urinalysis Basic - ( 2018 05:00 )    Color: PL Yellow / Appearance: Clear / S.023 / pH: x  Gluc: x / Ketone: Negative  / Bili: Negative / Urobili: Negative   Blood: x / Protein: Negative / Nitrite: Negative   Leuk Esterase: Negative / RBC: x / WBC x   Sq Epi: x / Non Sq Epi: x / Bacteria: x          All consultant(s) notes reviewed and care discussed with other providers    Contact Number, Dr Tineo [phone]
Patient is a 78y old  Male who presents with a chief complaint of SOB (2018 20:47)      Any change in ROS: I feel OK :  no SOB : no cough :     MEDICATIONS  (STANDING):  aspirin enteric coated 81 milliGRAM(s) Oral daily  atorvastatin 40 milliGRAM(s) Oral at bedtime  azithromycin   Tablet 500 milliGRAM(s) Oral daily  buDESOnide 160 MICROgram(s)/formoterol 4.5 MICROgram(s) Inhaler 2 Puff(s) Inhalation two times a day  cefTRIAXone   IVPB 1 Gram(s) IV Intermittent every 24 hours  clopidogrel Tablet 75 milliGRAM(s) Oral daily  dextrose 5%. 1000 milliLiter(s) (50 mL/Hr) IV Continuous <Continuous>  dextrose 5%. 1000 milliLiter(s) (50 mL/Hr) IV Continuous <Continuous>  dextrose 50% Injectable 12.5 Gram(s) IV Push once  dextrose 50% Injectable 25 Gram(s) IV Push once  dextrose 50% Injectable 25 Gram(s) IV Push once  dextrose 50% Injectable 12.5 Gram(s) IV Push once  dextrose 50% Injectable 25 Gram(s) IV Push once  dextrose 50% Injectable 25 Gram(s) IV Push once  heparin  Injectable 5000 Unit(s) SubCutaneous every 12 hours  insulin glargine Injectable (LANTUS) 16 Unit(s) SubCutaneous at bedtime  insulin lispro (HumaLOG) corrective regimen sliding scale   SubCutaneous three times a day before meals  insulin lispro (HumaLOG) corrective regimen sliding scale   SubCutaneous at bedtime  insulin lispro Injectable (HumaLOG) 7 Unit(s) SubCutaneous three times a day before meals  multivitamin/minerals 1 Tablet(s) Oral daily  tamsulosin 0.4 milliGRAM(s) Oral at bedtime    MEDICATIONS  (PRN):  acetaminophen   Tablet. 650 milliGRAM(s) Oral every 6 hours PRN Moderate Pain (4 - 6)  dextrose 40% Gel 15 Gram(s) Oral once PRN Blood Glucose LESS THAN 70 milliGRAM(s)/deciliter  dextrose 40% Gel 15 Gram(s) Oral once PRN Blood Glucose LESS THAN 70 milliGRAM(s)/deciliter  glucagon  Injectable 1 milliGRAM(s) IntraMuscular once PRN Glucose LESS THAN 70 milligrams/deciliter  glucagon  Injectable 1 milliGRAM(s) IntraMuscular once PRN Glucose LESS THAN 70 milligrams/deciliter    Vital Signs Last 24 Hrs  T(C): 36.4 (2018 05:09), Max: 36.9 (2018 19:57)  T(F): 97.6 (2018 05:09), Max: 98.4 (2018 19:57)  HR: 98 (2018 05:09) (91 - 99)  BP: 115/68 (2018 05:09) (108/70 - 120/72)  BP(mean): --  RR: 17 (2018 05:09) (17 - 20)  SpO2: 98% (2018 05:09) (94% - 98%)    I&O's Summary    2018 07:01  -  2018 07:00  --------------------------------------------------------  IN: 360 mL / OUT: 400 mL / NET: -40 mL          Physical Exam:   GENERAL: NAD, well-groomed, well-developed  HEENT: VIOLETA/   Atraumatic, Normocephalic  ENMT: No tonsillar erythema, exudates, or enlargement; Moist mucous membranes, Good dentition, No lesions  NECK: Supple, No JVD, Normal thyroid  CHEST/LUNG: scattered crackles+  CVS: Regular rate and rhythm; No murmurs, rubs, or gallops  GI: : Soft, Nontender, Nondistended; Bowel sounds present  NERVOUS SYSTEM:  Alert & Oriented X3  EXTREMITIES:  *- edema  LYMPH: No lymphadenopathy noted  SKIN: No rashes or lesions  ENDOCRINOLOGY: No Thyromegaly  PSYCH: Appropriate    Labs:  28, UNK  CARDIAC MARKERS ( 2018 01:21 )  x     / 0.02 ng/mL / x     / x     / x      CARDIAC MARKERS ( 2018 20:53 )  x     / 0.02 ng/mL / x     / x     / x      CARDIAC MARKERS ( 2018 19:41 )  x     / 0.02 ng/mL / 48 U/L / x     / 3.0 ng/mL  CARDIAC MARKERS ( 2018 14:42 )  x     / 0.03 ng/mL / 82 U/L / x     / 3.4 ng/mL                            11.3   10.14 )-----------( 267      ( 2018 07:50 )             37.4                         13.6   17.8  )-----------( 236      ( 2018 14:42 )             41.7         137  |  100  |  27<H>  ----------------------------<  170<H>  4.3   |  27  |  1.28      138  |  97  |  33<H>  ----------------------------<  366<H>  4.8   |  28  |  1.54<H>      136  |  96  |  28<H>  ----------------------------<  195<H>  4.9   |  26  |  1.81<H>    Ca    9.8      2018 06:50  Ca    9.4      2018 05:38  Ca    10.4      2018 14:42    TPro  7.8  /  Alb  3.7  /  TBili  0.6  /  DBili  x   /  AST  36  /  ALT  15  /  AlkPhos  101  06-11    CAPILLARY BLOOD GLUCOSE      POCT Blood Glucose.: 162 mg/dL (2018 07:35)  POCT Blood Glucose.: 214 mg/dL (2018 22:06)  POCT Blood Glucose.: 195 mg/dL (2018 16:39)  POCT Blood Glucose.: 331 mg/dL (2018 12:43)      LIVER FUNCTIONS - ( 2018 14:42 )  Alb: 3.7 g/dL / Pro: 7.8 g/dL / ALK PHOS: 101 U/L / ALT: 15 U/L / AST: 36 U/L / GGT: x           PT/INR - ( 2018 14:42 )   PT: 12.4 sec;   INR: 1.14 ratio         PTT - ( 2018 14:42 )  PTT:27.5 sec  Urinalysis Basic - ( 2018 05:00 )    Color: PL Yellow / Appearance: Clear / S.023 / pH: x  Gluc: x / Ketone: Negative  / Bili: Negative / Urobili: Negative   Blood: x / Protein: Negative / Nitrite: Negative   Leuk Esterase: Negative / RBC: x / WBC x   Sq Epi: x / Non Sq Epi: x / Bacteria: x            RECENT CULTURES:   @ 20:57 .Blood Blood-Peripheral                No growth to date.          RESPIRATORY CULTURES:          Studies  Chest X-RAY  CT SCAN Chest   Venous Dopplers: LE:   CT Abdomen  Others      < from: CT Chest No Cont (18 @ 18:11) >  lateral centrilobular emphysema. A 3 mm peripheral right upper lobe   calcified granuloma. Bilateral lower lobe parenchymal consolidation, new   from 2018 concerning for pneumonia.  PLEURA: No pleural effusion.  VESSELS: Mild atheromatous disease of the aorta.  HEART: Heart size is normal. No pericardial effusion. Aortic valvular and   coronary artery calcifications.  MEDIASTINUM AND FRANKLYN: A subcarinal lymph node measures 1.6 x 1.2 cm   (3:75), and is new.  CHEST WALL AND LOWER NECK: Status post sternotomy.  UPPER ABDOMEN: Within normal limits.  BONES: Mild degenerative changes of the visualized cervicothoracic spine.   Status post sternotomy.     IMPRESSION:    Bilateral lower lobe patchy consolidations concerning for pneumonia.   Follow-up to resolution is recommended.    Centrilobular emphysema.    Mildly enlarged subcarinal lymph node, which may be reactive.                LAITH GROSSMAN M.D., RADIOLOGY RESIDENT  This document has been electronically signed.  JEANNE GILL M.D., ATTENDING RADIOLOGIST  This document has been electronically signed. 2018  6:44PM        < end of copied text >
YUE COTTO 78y [id number]    Patient is a 78y old  Male who presents with a chief complaint of SOB (2018 10:23)      Follow Up/CC:  ID following for PNA    Interval History/ROS: feels better    Allergies    No Known Allergies    Intolerances    shellfish (Nausea)      ANTIMICROBIALS:  azithromycin   Tablet 500 daily  cefTRIAXone   IVPB 1 every 24 hours      MEDICATIONS  (STANDING):  aspirin enteric coated 81 milliGRAM(s) Oral daily  atorvastatin 40 milliGRAM(s) Oral at bedtime  azithromycin   Tablet 500 milliGRAM(s) Oral daily  buDESOnide 160 MICROgram(s)/formoterol 4.5 MICROgram(s) Inhaler 2 Puff(s) Inhalation two times a day  cefTRIAXone   IVPB 1 Gram(s) IV Intermittent every 24 hours  clopidogrel Tablet 75 milliGRAM(s) Oral daily  dextrose 5%. 1000 milliLiter(s) (50 mL/Hr) IV Continuous <Continuous>  dextrose 5%. 1000 milliLiter(s) (50 mL/Hr) IV Continuous <Continuous>  dextrose 50% Injectable 12.5 Gram(s) IV Push once  dextrose 50% Injectable 25 Gram(s) IV Push once  dextrose 50% Injectable 25 Gram(s) IV Push once  dextrose 50% Injectable 12.5 Gram(s) IV Push once  dextrose 50% Injectable 25 Gram(s) IV Push once  dextrose 50% Injectable 25 Gram(s) IV Push once  heparin  Injectable 5000 Unit(s) SubCutaneous every 12 hours  insulin glargine Injectable (LANTUS) 16 Unit(s) SubCutaneous at bedtime  insulin lispro (HumaLOG) corrective regimen sliding scale   SubCutaneous three times a day before meals  insulin lispro (HumaLOG) corrective regimen sliding scale   SubCutaneous at bedtime  insulin lispro Injectable (HumaLOG) 7 Unit(s) SubCutaneous three times a day before meals  multivitamin/minerals 1 Tablet(s) Oral daily  tamsulosin 0.4 milliGRAM(s) Oral at bedtime    MEDICATIONS  (PRN):  acetaminophen   Tablet. 650 milliGRAM(s) Oral every 6 hours PRN Moderate Pain (4 - 6)  dextrose 40% Gel 15 Gram(s) Oral once PRN Blood Glucose LESS THAN 70 milliGRAM(s)/deciliter  dextrose 40% Gel 15 Gram(s) Oral once PRN Blood Glucose LESS THAN 70 milliGRAM(s)/deciliter  glucagon  Injectable 1 milliGRAM(s) IntraMuscular once PRN Glucose LESS THAN 70 milligrams/deciliter  glucagon  Injectable 1 milliGRAM(s) IntraMuscular once PRN Glucose LESS THAN 70 milligrams/deciliter        Vital Signs Last 24 Hrs  T(C): 36.4 (2018 05:09), Max: 36.9 (2018 19:57)  T(F): 97.6 (2018 05:09), Max: 98.4 (2018 19:57)  HR: 90 (2018 11:50) (90 - 99)  BP: 119/74 (2018 11:50) (108/70 - 120/72)  BP(mean): --  RR: 17 (2018 05:09) (17 - 20)  SpO2: 98% (2018 11:50) (94% - 98%)    CBC Full  -  ( 2018 09:35 )  WBC Count : 13.50 K/uL  Hemoglobin : 11.1 g/dL  Hematocrit : 37.2 %  Platelet Count - Automated : 278 K/uL  Mean Cell Volume : 87.1 fl  Mean Cell Hemoglobin : 26.0 pg  Mean Cell Hemoglobin Concentration : 29.8 gm/dL  Auto Neutrophil # : x  Auto Lymphocyte # : x  Auto Monocyte # : x  Auto Eosinophil # : x  Auto Basophil # : x  Auto Neutrophil % : x  Auto Lymphocyte % : x  Auto Monocyte % : x  Auto Eosinophil % : x  Auto Basophil % : x    06-13    137  |  100  |  27<H>  ----------------------------<  170<H>  4.3   |  27  |  1.28    Ca    9.8      2018 06:50    TPro  7.8  /  Alb  3.7  /  TBili  0.6  /  DBili  x   /  AST  36  /  ALT  15  /  AlkPhos  101  06-11    LIVER FUNCTIONS - ( 2018 14:42 )  Alb: 3.7 g/dL / Pro: 7.8 g/dL / ALK PHOS: 101 U/L / ALT: 15 U/L / AST: 36 U/L / GGT: x           Urinalysis Basic - ( 2018 05:00 )    Color: PL Yellow / Appearance: Clear / S.023 / pH: x  Gluc: x / Ketone: Negative  / Bili: Negative / Urobili: Negative   Blood: x / Protein: Negative / Nitrite: Negative   Leuk Esterase: Negative / RBC: x / WBC x   Sq Epi: x / Non Sq Epi: x / Bacteria: x        MICROBIOLOGY:  .Urine Clean Catch (Midstream)  18   Normal Urogenital shayy present  --  --      .Blood Blood-Peripheral  18   No growth to date.  --  --      Rapid RVP Result: Eric ( @ 19:41)      RADIOLOGY
YUE COTTO 78y [id number]    Patient is a 78y old  Male who presents with a chief complaint of SOB (13 Jun 2018 10:23)      Follow Up/CC:  ID following for PNA    Interval History/ROS: feels ok    Allergies    No Known Allergies    Intolerances    shellfish (Nausea)      ANTIMICROBIALS:  azithromycin   Tablet 500 daily  cefuroxime   Tablet 500 every 12 hours      MEDICATIONS  (STANDING):  aspirin enteric coated 81 milliGRAM(s) Oral daily  atorvastatin 40 milliGRAM(s) Oral at bedtime  azithromycin   Tablet 500 milliGRAM(s) Oral daily  buDESOnide 160 MICROgram(s)/formoterol 4.5 MICROgram(s) Inhaler 2 Puff(s) Inhalation two times a day  cefuroxime   Tablet 500 milliGRAM(s) Oral every 12 hours  clopidogrel Tablet 75 milliGRAM(s) Oral daily  dextrose 5%. 1000 milliLiter(s) (50 mL/Hr) IV Continuous <Continuous>  dextrose 5%. 1000 milliLiter(s) (50 mL/Hr) IV Continuous <Continuous>  dextrose 50% Injectable 12.5 Gram(s) IV Push once  dextrose 50% Injectable 25 Gram(s) IV Push once  dextrose 50% Injectable 25 Gram(s) IV Push once  dextrose 50% Injectable 12.5 Gram(s) IV Push once  dextrose 50% Injectable 25 Gram(s) IV Push once  dextrose 50% Injectable 25 Gram(s) IV Push once  heparin  Injectable 5000 Unit(s) SubCutaneous every 12 hours  insulin glargine Injectable (LANTUS) 16 Unit(s) SubCutaneous at bedtime  insulin lispro (HumaLOG) corrective regimen sliding scale   SubCutaneous three times a day before meals  insulin lispro (HumaLOG) corrective regimen sliding scale   SubCutaneous at bedtime  insulin lispro Injectable (HumaLOG) 7 Unit(s) SubCutaneous three times a day before meals  multivitamin/minerals 1 Tablet(s) Oral daily  tamsulosin 0.4 milliGRAM(s) Oral at bedtime  theophylline ER (24 Hour) 400 milliGRAM(s) Oral daily  tiotropium 18 MICROgram(s) Capsule 1 Capsule(s) Inhalation daily    MEDICATIONS  (PRN):  acetaminophen   Tablet. 650 milliGRAM(s) Oral every 6 hours PRN Moderate Pain (4 - 6)  dextrose 40% Gel 15 Gram(s) Oral once PRN Blood Glucose LESS THAN 70 milliGRAM(s)/deciliter  dextrose 40% Gel 15 Gram(s) Oral once PRN Blood Glucose LESS THAN 70 milliGRAM(s)/deciliter  glucagon  Injectable 1 milliGRAM(s) IntraMuscular once PRN Glucose LESS THAN 70 milligrams/deciliter  glucagon  Injectable 1 milliGRAM(s) IntraMuscular once PRN Glucose LESS THAN 70 milligrams/deciliter        Vital Signs Last 24 Hrs  T(C): 37.5 (14 Jun 2018 05:01), Max: 37.5 (14 Jun 2018 05:01)  T(F): 99.5 (14 Jun 2018 05:01), Max: 99.5 (14 Jun 2018 05:01)  HR: 107 (14 Jun 2018 05:01) (100 - 107)  BP: 106/67 (14 Jun 2018 05:01) (106/67 - 125/73)  BP(mean): --  RR: 18 (14 Jun 2018 05:01) (18 - 18)  SpO2: 95% (14 Jun 2018 05:01) (94% - 96%)    CBC Full  -  ( 14 Jun 2018 08:18 )  WBC Count : 9.63 K/uL  Hemoglobin : 12.2 g/dL  Hematocrit : 38.8 %  Platelet Count - Automated : 318 K/uL  Mean Cell Volume : 87.0 fl  Mean Cell Hemoglobin : 27.4 pg  Mean Cell Hemoglobin Concentration : 31.4 gm/dL  Auto Neutrophil # : x  Auto Lymphocyte # : x  Auto Monocyte # : x  Auto Eosinophil # : x  Auto Basophil # : x  Auto Neutrophil % : x  Auto Lymphocyte % : x  Auto Monocyte % : x  Auto Eosinophil % : x  Auto Basophil % : x    06-13    137  |  100  |  27<H>  ----------------------------<  170<H>  4.3   |  27  |  1.28    Ca    9.8      13 Jun 2018 06:50            MICROBIOLOGY:  .Urine Clean Catch (Midstream)  06-12-18   Normal Urogenital shayy present  --  --      .Blood Blood-Peripheral  06-11-18   No growth to date.  --  --      Rapid RVP Result: Eric (06-11 @ 19:41)    RADIOLOGY

## 2018-06-14 NOTE — PROGRESS NOTE ADULT - NEGATIVE GASTROINTESTINAL SYMPTOMS
no vomiting/no nausea/no abdominal pain/no diarrhea
no diarrhea/no abdominal pain/no vomiting/no nausea

## 2018-06-14 NOTE — PROGRESS NOTE ADULT - PROBLEM SELECTOR PROBLEM 3
COPD (chronic obstructive pulmonary disease)
Diabetes mellitus
Lymphadenopathy
Lymphadenopathy
Leukocytosis, unspecified type
Leukocytosis, unspecified type

## 2018-06-14 NOTE — PROGRESS NOTE ADULT - PROBLEM SELECTOR PLAN 6
chest pain free  ruled out for MI
Conth ome meds
Conth ome meds
chest pain free  ruled out for MI
improved hypotension  will resume BP meds as needed gradually

## 2018-06-14 NOTE — PROGRESS NOTE ADULT - PROBLEM SELECTOR PROBLEM 5
MERRILL (acute kidney injury)
CAD (Coronary Artery Disease)
MERRILL (acute kidney injury)

## 2018-06-14 NOTE — PROGRESS NOTE ADULT - PROBLEM SELECTOR PLAN 7
improved hypotension  will resume BP meds at discharge
asymptomatic   outpatient follow up with vascular attending after discharge
improved hypotension  will resume BP meds at discharge
seems to be controlled
seems to be controlled

## 2018-06-14 NOTE — DIETITIAN INITIAL EVALUATION ADULT. - NS AS NUTRI INTERV ED CONTENT
Pt's current HgbA1c 6.2% WLD, declines Consistent Carbohydrate diet education at this time. RD availability made known to pt

## 2018-06-14 NOTE — PROGRESS NOTE ADULT - PROBLEM SELECTOR PLAN 5
on admission secondary to ATN from sepsis and dehydration   resolved  renal help appreciated
chest pain likely not cardiac  cardiac enzymes negative  cardiology attending evaluation appreciated
monitor:
monitor:
on admission secondary to ATN from sepsis and dehydration   resolved  renal help appreciated

## 2018-06-14 NOTE — DIETITIAN INITIAL EVALUATION ADULT. - OTHER INFO
Nutrition consult received for T2DM. Pt reports UBW between 210-218 pounds, denies any recent wt fluctuations. Per previous RD note, pt wt was documented as 210.1 pounds, current wt is 218 pounds. Pt reports good appetite and po intakes, observed 100% po intake of breakfast at visit, reports still feeling hungry, flowsheet noted 100% po intakes. No GI distress, +BM yesterday. Pt was evaluated by SLP swallow evaluation yesterday recommended to continue with Regular consistency diet, denies chewing/swallowing difficulties. NKFA - pt denies having shellfish allergy and states he just does not like to eat them. Pt with T2DM on Metformin at home, reports taking fingersticks 2 x day with usual BG 110mg/dL. PTA takes multivitamin supplement.

## 2018-06-14 NOTE — PROGRESS NOTE ADULT - GASTROINTESTINAL DETAILS
soft/no rebound tenderness/no distention/nontender
no rebound tenderness/nontender/no distention/soft

## 2018-06-14 NOTE — PROGRESS NOTE ADULT - ASSESSMENT
78 male history of COPD on 2LNC at home, CAD, DM 2, HTN, HLD, PVD started feeling unwell 2 days ago. Clinically had pneumonia now confirmed with CT chest
78 male history of COPD on 2LNC at home, CAD, DM 2, HTN, HLD, PVD started feeling unwell 2 days ago. Clinically had pneumonia now confirmed with CT chest
78 male history of COPD on 2LNC at home, CAD, DM 2, HTN, HLD, PVD started feeling unwell 2 days ago.
ASSESSMENT AND PLAN:  A 78-year-old gentleman with past medical history of diabetes, hypertension, peripheral vascular disease, and chronic obstructive pulmonary disease, presents with hypotension and tachycardia but is nonseptic in appearance.  He has an elevated white count, but recently placed on prednisone.    Community acquired PNA  MERRILL    1.	Renal:    Acute kidney injury is likely hemodynamic in nature and improving.  2.	Cardiovascular:  Hold diovan and diuretics for now  3.	Cardiology:  MOSELEY  in progress.  Need to check echo.  4.	ID:  PO/IV antibiotics.
ASSESSMENT AND PLAN:  A 78-year-old gentleman with past medical history of diabetes, hypertension, peripheral vascular disease, and chronic obstructive pulmonary disease, presents with hypotension and tachycardia but is nonseptic in appearance.  He has an elevated white count, but recently placed on prednisone.    Community acquired PNA  MERRILL    1.	Renal:    Acute kidney injury was hemodynamic in nature resolved.  2.	Cardiovascular: Can restart diovan and diuretics   3.	ID:  PO antibiotics.        DC planning
ASSESSMENT AND PLAN:  A 78-year-old gentleman with past medical history of diabetes, hypertension, peripheral vascular disease, and chronic obstructive pulmonary disease, presents with hypotension and tachycardia but is nonseptic in appearance.  He has an elevated white count, but recently placed on prednisone.    Community acquired PNA  MERRILL    1.	Renal:  We will check UA.  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  Acute kidney injury is likely hemodynamic in nature.  If the CTA needs to be done then  no renal objections.  2.	Cardiovascular:  Hold Diovan and all diuretics.  Continue with IV hydration.  3.	Cardiology:  MOSELEY  in progress.  Need to check echo.  4.	ID:  IV antibiotics.  Further workup pending above.
Assessment  DMT2: 78y Male with DM T2 with hyperglycemia on insulin, blood sugars improving, no hypoglycemic episode,  eating meals,  non compliant with low carb diet.  CAD: On medications, stable, monitored.  Pneumonia: on treatment, stable.
Assessment  DMT2: 78y Male with DM T2 with hyperglycemia on insulin, blood sugars improving, no hypoglycemic episode,  eating meals,  non compliant with low carb diet.  CAD: On medications, stable, monitored.  Pneumonia: on treatment, stable.
78 male history of COPD on 2LNC at home, CAD, DM 2, HTN, HLD, PVD started feeling unwell 2 days ago with fever, leukocytosis, sepsis, CAP
78 male history of COPD on 2LNC at home, CAD, DM 2, HTN, HLD, PVD started feeling unwell 2 days ago with fever, leukocytosis, sepsis, CAP

## 2018-06-15 RX ORDER — CEFUROXIME AXETIL 250 MG
1 TABLET ORAL
Qty: 6 | Refills: 0 | OUTPATIENT
Start: 2018-06-15 | End: 2018-06-17

## 2018-10-03 NOTE — PATIENT PROFILE ADULT. - TOBACCO USE
Last seen: 04/24/18 by Dr. Samson  Next appt: None     Was the patient seen in the last year in this department? Yes   Does patient have an active prescription for medications requested? No   Received Request Via: Pharmacy   Former smoker

## 2018-10-26 ENCOUNTER — INPATIENT (INPATIENT)
Facility: HOSPITAL | Age: 78
LOS: 6 days | Discharge: NURSING, REHAB OR LONG-TERM CARE | DRG: 208 | End: 2018-11-02
Attending: INTERNAL MEDICINE | Admitting: INTERNAL MEDICINE
Payer: COMMERCIAL

## 2018-10-26 VITALS
DIASTOLIC BLOOD PRESSURE: 81 MMHG | SYSTOLIC BLOOD PRESSURE: 196 MMHG | HEART RATE: 112 BPM | TEMPERATURE: 96 F | WEIGHT: 220.02 LBS | RESPIRATION RATE: 32 BRPM | OXYGEN SATURATION: 99 %

## 2018-10-26 DIAGNOSIS — J96.02 ACUTE RESPIRATORY FAILURE WITH HYPERCAPNIA: ICD-10-CM

## 2018-10-26 PROBLEM — I73.9 PERIPHERAL VASCULAR DISEASE, UNSPECIFIED: Chronic | Status: ACTIVE | Noted: 2018-01-20

## 2018-10-26 LAB
ALBUMIN SERPL ELPH-MCNC: 3.3 G/DL — SIGNIFICANT CHANGE UP (ref 3.3–5)
ALBUMIN SERPL ELPH-MCNC: 4 G/DL — SIGNIFICANT CHANGE UP (ref 3.3–5)
ALP SERPL-CCNC: 107 U/L — SIGNIFICANT CHANGE UP (ref 40–120)
ALP SERPL-CCNC: 97 U/L — SIGNIFICANT CHANGE UP (ref 40–120)
ALT FLD-CCNC: 100 U/L — HIGH (ref 12–78)
ALT FLD-CCNC: 31 U/L — SIGNIFICANT CHANGE UP (ref 12–78)
ANION GAP SERPL CALC-SCNC: 6 MMOL/L — SIGNIFICANT CHANGE UP (ref 5–17)
ANION GAP SERPL CALC-SCNC: 7 MMOL/L — SIGNIFICANT CHANGE UP (ref 5–17)
ANION GAP SERPL CALC-SCNC: 8 MMOL/L — SIGNIFICANT CHANGE UP (ref 5–17)
APPEARANCE UR: ABNORMAL
APTT BLD: 34 SEC — SIGNIFICANT CHANGE UP (ref 27.5–37.4)
AST SERPL-CCNC: 32 U/L — SIGNIFICANT CHANGE UP (ref 15–37)
AST SERPL-CCNC: 84 U/L — HIGH (ref 15–37)
BASE EXCESS BLDA CALC-SCNC: -0.5 MMOL/L — SIGNIFICANT CHANGE UP (ref -2–2)
BASE EXCESS BLDA CALC-SCNC: -2 MMOL/L — SIGNIFICANT CHANGE UP (ref -2–2)
BASE EXCESS BLDA CALC-SCNC: 0.1 MMOL/L — SIGNIFICANT CHANGE UP (ref -2–2)
BASE EXCESS BLDA CALC-SCNC: 0.5 MMOL/L — SIGNIFICANT CHANGE UP (ref -2–2)
BASE EXCESS BLDA CALC-SCNC: 2 MMOL/L — SIGNIFICANT CHANGE UP (ref -2–2)
BASE EXCESS BLDA CALC-SCNC: 3.5 MMOL/L — HIGH (ref -2–2)
BASOPHILS # BLD AUTO: 0.06 K/UL — SIGNIFICANT CHANGE UP (ref 0–0.2)
BASOPHILS NFR BLD AUTO: 0.4 % — SIGNIFICANT CHANGE UP (ref 0–2)
BILIRUB SERPL-MCNC: 0.3 MG/DL — SIGNIFICANT CHANGE UP (ref 0.2–1.2)
BILIRUB SERPL-MCNC: 0.3 MG/DL — SIGNIFICANT CHANGE UP (ref 0.2–1.2)
BILIRUB UR-MCNC: NEGATIVE — SIGNIFICANT CHANGE UP
BLOOD GAS COMMENTS ARTERIAL: SIGNIFICANT CHANGE UP
BUN SERPL-MCNC: 19 MG/DL — SIGNIFICANT CHANGE UP (ref 7–23)
BUN SERPL-MCNC: 21 MG/DL — SIGNIFICANT CHANGE UP (ref 7–23)
BUN SERPL-MCNC: 26 MG/DL — HIGH (ref 7–23)
CALCIUM SERPL-MCNC: 8.8 MG/DL — SIGNIFICANT CHANGE UP (ref 8.5–10.1)
CALCIUM SERPL-MCNC: 9.6 MG/DL — SIGNIFICANT CHANGE UP (ref 8.5–10.1)
CALCIUM SERPL-MCNC: 9.6 MG/DL — SIGNIFICANT CHANGE UP (ref 8.5–10.1)
CHLORIDE SERPL-SCNC: 100 MMOL/L — SIGNIFICANT CHANGE UP (ref 96–108)
CHLORIDE SERPL-SCNC: 100 MMOL/L — SIGNIFICANT CHANGE UP (ref 96–108)
CHLORIDE SERPL-SCNC: 95 MMOL/L — LOW (ref 96–108)
CK MB BLD-MCNC: 4.2 % — HIGH (ref 0–3.5)
CK MB CFR SERPL CALC: 19.2 NG/ML — HIGH (ref 0–3.6)
CK SERPL-CCNC: 454 U/L — HIGH (ref 26–308)
CO2 SERPL-SCNC: 26 MMOL/L — SIGNIFICANT CHANGE UP (ref 22–31)
CO2 SERPL-SCNC: 32 MMOL/L — HIGH (ref 22–31)
CO2 SERPL-SCNC: 33 MMOL/L — HIGH (ref 22–31)
COLOR SPEC: YELLOW — SIGNIFICANT CHANGE UP
CREAT SERPL-MCNC: 1.2 MG/DL — SIGNIFICANT CHANGE UP (ref 0.5–1.3)
CREAT SERPL-MCNC: 1.7 MG/DL — HIGH (ref 0.5–1.3)
CREAT SERPL-MCNC: 1.9 MG/DL — HIGH (ref 0.5–1.3)
DIFF PNL FLD: ABNORMAL
EOSINOPHIL # BLD AUTO: 0.81 K/UL — HIGH (ref 0–0.5)
EOSINOPHIL NFR BLD AUTO: 5 % — SIGNIFICANT CHANGE UP (ref 0–6)
GLUCOSE SERPL-MCNC: 199 MG/DL — HIGH (ref 70–99)
GLUCOSE SERPL-MCNC: 310 MG/DL — HIGH (ref 70–99)
GLUCOSE SERPL-MCNC: 480 MG/DL — CRITICAL HIGH (ref 70–99)
GLUCOSE UR QL: 1000 MG/DL
HCO3 BLDA-SCNC: 20 MMOL/L — LOW (ref 23–27)
HCO3 BLDA-SCNC: 20 MMOL/L — LOW (ref 23–27)
HCO3 BLDA-SCNC: 22 MMOL/L — LOW (ref 23–27)
HCO3 BLDA-SCNC: 23 MMOL/L — SIGNIFICANT CHANGE UP (ref 23–27)
HCO3 BLDA-SCNC: 26 MMOL/L — SIGNIFICANT CHANGE UP (ref 23–27)
HCT VFR BLD CALC: 41.1 % — SIGNIFICANT CHANGE UP (ref 39–50)
HCT VFR BLD CALC: 43.4 % — SIGNIFICANT CHANGE UP (ref 39–50)
HGB BLD-MCNC: 12.6 G/DL — LOW (ref 13–17)
HGB BLD-MCNC: 13.2 G/DL — SIGNIFICANT CHANGE UP (ref 13–17)
HOROWITZ INDEX BLDA+IHG-RTO: 100 — SIGNIFICANT CHANGE UP
HOROWITZ INDEX BLDA+IHG-RTO: 100 — SIGNIFICANT CHANGE UP
HOROWITZ INDEX BLDA+IHG-RTO: 21 — SIGNIFICANT CHANGE UP
HOROWITZ INDEX BLDA+IHG-RTO: 40 — SIGNIFICANT CHANGE UP
IMM GRANULOCYTES NFR BLD AUTO: 1.4 % — SIGNIFICANT CHANGE UP (ref 0–1.5)
INR BLD: 0.96 RATIO — SIGNIFICANT CHANGE UP (ref 0.88–1.16)
KETONES UR-MCNC: NEGATIVE — SIGNIFICANT CHANGE UP
LACTATE SERPL-SCNC: 5 MMOL/L — CRITICAL HIGH (ref 0.7–2)
LACTATE SERPL-SCNC: 5.7 MMOL/L — CRITICAL HIGH (ref 0.7–2)
LEUKOCYTE ESTERASE UR-ACNC: NEGATIVE — SIGNIFICANT CHANGE UP
LYMPHOCYTES # BLD AUTO: 13.7 % — SIGNIFICANT CHANGE UP (ref 13–44)
LYMPHOCYTES # BLD AUTO: 2.21 K/UL — SIGNIFICANT CHANGE UP (ref 1–3.3)
MAGNESIUM SERPL-MCNC: 2.2 MG/DL — SIGNIFICANT CHANGE UP (ref 1.6–2.6)
MCHC RBC-ENTMCNC: 27.3 PG — SIGNIFICANT CHANGE UP (ref 27–34)
MCHC RBC-ENTMCNC: 27.8 PG — SIGNIFICANT CHANGE UP (ref 27–34)
MCHC RBC-ENTMCNC: 30.4 GM/DL — LOW (ref 32–36)
MCHC RBC-ENTMCNC: 30.7 GM/DL — LOW (ref 32–36)
MCV RBC AUTO: 89.7 FL — SIGNIFICANT CHANGE UP (ref 80–100)
MCV RBC AUTO: 90.7 FL — SIGNIFICANT CHANGE UP (ref 80–100)
MONOCYTES # BLD AUTO: 1.24 K/UL — HIGH (ref 0–0.9)
MONOCYTES NFR BLD AUTO: 7.7 % — SIGNIFICANT CHANGE UP (ref 2–14)
NEUTROPHILS # BLD AUTO: 11.63 K/UL — HIGH (ref 1.8–7.4)
NEUTROPHILS NFR BLD AUTO: 71.8 % — SIGNIFICANT CHANGE UP (ref 43–77)
NITRITE UR-MCNC: NEGATIVE — SIGNIFICANT CHANGE UP
NRBC # BLD: 0 /100 WBCS — SIGNIFICANT CHANGE UP (ref 0–0)
NT-PROBNP SERPL-SCNC: 204 PG/ML — SIGNIFICANT CHANGE UP (ref 0–450)
PCO2 BLDA: 103 MMHG — CRITICAL HIGH (ref 32–46)
PCO2 BLDA: 109 MMHG — CRITICAL HIGH (ref 32–46)
PCO2 BLDA: 110 MMHG — CRITICAL HIGH (ref 32–46)
PCO2 BLDA: 65 MMHG — HIGH (ref 32–46)
PCO2 BLDA: 80 MMHG — CRITICAL HIGH (ref 32–46)
PCO2 BLDA: 95 MMHG — CRITICAL HIGH (ref 32–46)
PCO2 BLDA: 99 MMHG — CRITICAL HIGH (ref 32–46)
PH BLDA: 7.06 — CRITICAL LOW (ref 7.35–7.45)
PH BLDA: 7.06 — CRITICAL LOW (ref 7.35–7.45)
PH BLDA: 7.08 — CRITICAL LOW (ref 7.35–7.45)
PH BLDA: 7.1 — CRITICAL LOW (ref 7.35–7.45)
PH BLDA: 7.11 — CRITICAL LOW (ref 7.35–7.45)
PH BLDA: 7.17 — CRITICAL LOW (ref 7.35–7.45)
PH BLDA: 7.28 — LOW (ref 7.35–7.45)
PH UR: 5 — SIGNIFICANT CHANGE UP (ref 5–8)
PHOSPHATE SERPL-MCNC: 2.9 MG/DL — SIGNIFICANT CHANGE UP (ref 2.5–4.5)
PLATELET # BLD AUTO: 319 K/UL — SIGNIFICANT CHANGE UP (ref 150–400)
PLATELET # BLD AUTO: 369 K/UL — SIGNIFICANT CHANGE UP (ref 150–400)
PO2 BLDA: 136 MMHG — HIGH (ref 74–108)
PO2 BLDA: 147 MMHG — HIGH (ref 74–108)
PO2 BLDA: 171 MMHG — HIGH (ref 74–108)
PO2 BLDA: 398 MMHG — HIGH (ref 74–108)
PO2 BLDA: 441 MMHG — HIGH (ref 74–108)
PO2 BLDA: 55 MMHG — LOW (ref 74–108)
PO2 BLDA: 98 MMHG — SIGNIFICANT CHANGE UP (ref 74–108)
POTASSIUM SERPL-MCNC: 4.7 MMOL/L — SIGNIFICANT CHANGE UP (ref 3.5–5.3)
POTASSIUM SERPL-MCNC: 5 MMOL/L — SIGNIFICANT CHANGE UP (ref 3.5–5.3)
POTASSIUM SERPL-MCNC: 5.5 MMOL/L — HIGH (ref 3.5–5.3)
POTASSIUM SERPL-SCNC: 4.7 MMOL/L — SIGNIFICANT CHANGE UP (ref 3.5–5.3)
POTASSIUM SERPL-SCNC: 5 MMOL/L — SIGNIFICANT CHANGE UP (ref 3.5–5.3)
POTASSIUM SERPL-SCNC: 5.5 MMOL/L — HIGH (ref 3.5–5.3)
PROT SERPL-MCNC: 6.9 G/DL — SIGNIFICANT CHANGE UP (ref 6–8.3)
PROT SERPL-MCNC: 7.5 G/DL — SIGNIFICANT CHANGE UP (ref 6–8.3)
PROT UR-MCNC: 150 MG/DL
PROTHROM AB SERPL-ACNC: 10.4 SEC — SIGNIFICANT CHANGE UP (ref 9.8–12.7)
RAPID RVP RESULT: SIGNIFICANT CHANGE UP
RBC # BLD: 4.53 M/UL — SIGNIFICANT CHANGE UP (ref 4.2–5.8)
RBC # BLD: 4.84 M/UL — SIGNIFICANT CHANGE UP (ref 4.2–5.8)
RBC # FLD: 13.2 % — SIGNIFICANT CHANGE UP (ref 10.3–14.5)
RBC # FLD: 13.4 % — SIGNIFICANT CHANGE UP (ref 10.3–14.5)
SAO2 % BLDA: 100 % — HIGH (ref 92–96)
SAO2 % BLDA: 100 % — HIGH (ref 92–96)
SAO2 % BLDA: 89 % — LOW (ref 92–96)
SAO2 % BLDA: 96 % — SIGNIFICANT CHANGE UP (ref 92–96)
SAO2 % BLDA: 98 % — HIGH (ref 92–96)
SAO2 % BLDA: 99 % — HIGH (ref 92–96)
SAO2 % BLDA: 99 % — HIGH (ref 92–96)
SODIUM SERPL-SCNC: 134 MMOL/L — LOW (ref 135–145)
SODIUM SERPL-SCNC: 134 MMOL/L — LOW (ref 135–145)
SODIUM SERPL-SCNC: 139 MMOL/L — SIGNIFICANT CHANGE UP (ref 135–145)
SP GR SPEC: 1.02 — SIGNIFICANT CHANGE UP (ref 1.01–1.02)
TROPONIN I SERPL-MCNC: 0.15 NG/ML — HIGH (ref 0.01–0.04)
UROBILINOGEN FLD QL: NEGATIVE — SIGNIFICANT CHANGE UP
WBC # BLD: 15.31 K/UL — HIGH (ref 3.8–10.5)
WBC # BLD: 16.17 K/UL — HIGH (ref 3.8–10.5)
WBC # FLD AUTO: 15.31 K/UL — HIGH (ref 3.8–10.5)
WBC # FLD AUTO: 16.17 K/UL — HIGH (ref 3.8–10.5)

## 2018-10-26 PROCEDURE — 99291 CRITICAL CARE FIRST HOUR: CPT

## 2018-10-26 PROCEDURE — 71045 X-RAY EXAM CHEST 1 VIEW: CPT | Mod: 26

## 2018-10-26 PROCEDURE — 99292 CRITICAL CARE ADDL 30 MIN: CPT

## 2018-10-26 PROCEDURE — 93010 ELECTROCARDIOGRAM REPORT: CPT

## 2018-10-26 PROCEDURE — 71045 X-RAY EXAM CHEST 1 VIEW: CPT | Mod: 26,77

## 2018-10-26 RX ORDER — AZITHROMYCIN 500 MG/1
500 TABLET, FILM COATED ORAL ONCE
Qty: 0 | Refills: 0 | Status: COMPLETED | OUTPATIENT
Start: 2018-10-26 | End: 2018-10-26

## 2018-10-26 RX ORDER — PANTOPRAZOLE SODIUM 20 MG/1
40 TABLET, DELAYED RELEASE ORAL DAILY
Qty: 0 | Refills: 0 | Status: DISCONTINUED | OUTPATIENT
Start: 2018-10-26 | End: 2018-10-30

## 2018-10-26 RX ORDER — ALBUTEROL 90 UG/1
4 AEROSOL, METERED ORAL EVERY 4 HOURS
Qty: 0 | Refills: 0 | Status: DISCONTINUED | OUTPATIENT
Start: 2018-10-26 | End: 2018-10-26

## 2018-10-26 RX ORDER — INSULIN HUMAN 100 [IU]/ML
1 INJECTION, SOLUTION SUBCUTANEOUS
Qty: 100 | Refills: 0 | Status: DISCONTINUED | OUTPATIENT
Start: 2018-10-26 | End: 2018-10-28

## 2018-10-26 RX ORDER — SODIUM CHLORIDE 9 MG/ML
1000 INJECTION INTRAMUSCULAR; INTRAVENOUS; SUBCUTANEOUS
Qty: 0 | Refills: 0 | Status: DISCONTINUED | OUTPATIENT
Start: 2018-10-26 | End: 2018-10-28

## 2018-10-26 RX ORDER — CEFTRIAXONE 500 MG/1
1 INJECTION, POWDER, FOR SOLUTION INTRAMUSCULAR; INTRAVENOUS EVERY 24 HOURS
Qty: 0 | Refills: 0 | Status: COMPLETED | OUTPATIENT
Start: 2018-10-26 | End: 2018-10-30

## 2018-10-26 RX ORDER — ALBUTEROL 90 UG/1
1 AEROSOL, METERED ORAL
Qty: 0 | Refills: 0 | Status: DISCONTINUED | OUTPATIENT
Start: 2018-10-26 | End: 2018-10-26

## 2018-10-26 RX ORDER — ALBUTEROL 90 UG/1
4 AEROSOL, METERED ORAL EVERY 6 HOURS
Qty: 0 | Refills: 0 | Status: DISCONTINUED | OUTPATIENT
Start: 2018-10-26 | End: 2018-10-27

## 2018-10-26 RX ORDER — DEXTROSE 50 % IN WATER 50 %
25 SYRINGE (ML) INTRAVENOUS ONCE
Qty: 0 | Refills: 0 | Status: DISCONTINUED | OUTPATIENT
Start: 2018-10-26 | End: 2018-10-26

## 2018-10-26 RX ORDER — IPRATROPIUM/ALBUTEROL SULFATE 18-103MCG
3 AEROSOL WITH ADAPTER (GRAM) INHALATION EVERY 6 HOURS
Qty: 0 | Refills: 0 | Status: DISCONTINUED | OUTPATIENT
Start: 2018-10-26 | End: 2018-10-26

## 2018-10-26 RX ORDER — LABETALOL HCL 100 MG
10 TABLET ORAL ONCE
Qty: 0 | Refills: 0 | Status: COMPLETED | OUTPATIENT
Start: 2018-10-26 | End: 2018-10-26

## 2018-10-26 RX ORDER — HYDRALAZINE HCL 50 MG
10 TABLET ORAL ONCE
Qty: 0 | Refills: 0 | Status: DISCONTINUED | OUTPATIENT
Start: 2018-10-26 | End: 2018-10-26

## 2018-10-26 RX ORDER — INSULIN LISPRO 100/ML
VIAL (ML) SUBCUTANEOUS EVERY 6 HOURS
Qty: 0 | Refills: 0 | Status: DISCONTINUED | OUTPATIENT
Start: 2018-10-26 | End: 2018-10-26

## 2018-10-26 RX ORDER — KETAMINE HYDROCHLORIDE 100 MG/ML
0.5 INJECTION INTRAMUSCULAR; INTRAVENOUS
Qty: 1000 | Refills: 0 | Status: DISCONTINUED | OUTPATIENT
Start: 2018-10-26 | End: 2018-10-27

## 2018-10-26 RX ORDER — IPRATROPIUM BROMIDE 0.2 MG/ML
4 SOLUTION, NON-ORAL INHALATION EVERY 6 HOURS
Qty: 0 | Refills: 0 | Status: DISCONTINUED | OUTPATIENT
Start: 2018-10-26 | End: 2018-10-26

## 2018-10-26 RX ORDER — IPRATROPIUM/ALBUTEROL SULFATE 18-103MCG
3 AEROSOL WITH ADAPTER (GRAM) INHALATION
Qty: 0 | Refills: 0 | Status: DISCONTINUED | OUTPATIENT
Start: 2018-10-26 | End: 2018-10-26

## 2018-10-26 RX ORDER — GLUCAGON INJECTION, SOLUTION 0.5 MG/.1ML
1 INJECTION, SOLUTION SUBCUTANEOUS ONCE
Qty: 0 | Refills: 0 | Status: DISCONTINUED | OUTPATIENT
Start: 2018-10-26 | End: 2018-10-26

## 2018-10-26 RX ORDER — MAGNESIUM SULFATE 500 MG/ML
2 VIAL (ML) INJECTION ONCE
Qty: 0 | Refills: 0 | Status: COMPLETED | OUTPATIENT
Start: 2018-10-26 | End: 2018-10-26

## 2018-10-26 RX ORDER — SODIUM CHLORIDE 9 MG/ML
1000 INJECTION, SOLUTION INTRAVENOUS
Qty: 0 | Refills: 0 | Status: DISCONTINUED | OUTPATIENT
Start: 2018-10-26 | End: 2018-10-26

## 2018-10-26 RX ORDER — DEXTROSE 50 % IN WATER 50 %
15 SYRINGE (ML) INTRAVENOUS ONCE
Qty: 0 | Refills: 0 | Status: DISCONTINUED | OUTPATIENT
Start: 2018-10-26 | End: 2018-10-26

## 2018-10-26 RX ORDER — PROPOFOL 10 MG/ML
10 INJECTION, EMULSION INTRAVENOUS
Qty: 1000 | Refills: 0 | Status: DISCONTINUED | OUTPATIENT
Start: 2018-10-26 | End: 2018-10-29

## 2018-10-26 RX ORDER — DEXTROSE 50 % IN WATER 50 %
12.5 SYRINGE (ML) INTRAVENOUS ONCE
Qty: 0 | Refills: 0 | Status: DISCONTINUED | OUTPATIENT
Start: 2018-10-26 | End: 2018-10-26

## 2018-10-26 RX ORDER — HEPARIN SODIUM 5000 [USP'U]/ML
5000 INJECTION INTRAVENOUS; SUBCUTANEOUS EVERY 8 HOURS
Qty: 0 | Refills: 0 | Status: DISCONTINUED | OUTPATIENT
Start: 2018-10-26 | End: 2018-10-30

## 2018-10-26 RX ORDER — CALCIUM GLUCONATE 100 MG/ML
2 VIAL (ML) INTRAVENOUS ONCE
Qty: 0 | Refills: 0 | Status: COMPLETED | OUTPATIENT
Start: 2018-10-26 | End: 2018-10-26

## 2018-10-26 RX ORDER — AZITHROMYCIN 500 MG/1
500 TABLET, FILM COATED ORAL EVERY 24 HOURS
Qty: 0 | Refills: 0 | Status: COMPLETED | OUTPATIENT
Start: 2018-10-27 | End: 2018-10-28

## 2018-10-26 RX ORDER — METOPROLOL TARTRATE 50 MG
5 TABLET ORAL ONCE
Qty: 0 | Refills: 0 | Status: COMPLETED | OUTPATIENT
Start: 2018-10-26 | End: 2018-10-26

## 2018-10-26 RX ORDER — IPRATROPIUM BROMIDE 0.2 MG/ML
1 SOLUTION, NON-ORAL INHALATION EVERY 6 HOURS
Qty: 0 | Refills: 0 | Status: DISCONTINUED | OUTPATIENT
Start: 2018-10-26 | End: 2018-10-27

## 2018-10-26 RX ORDER — INSULIN HUMAN 100 [IU]/ML
10 INJECTION, SOLUTION SUBCUTANEOUS ONCE
Qty: 0 | Refills: 0 | Status: COMPLETED | OUTPATIENT
Start: 2018-10-26 | End: 2018-10-26

## 2018-10-26 RX ORDER — KETAMINE HYDROCHLORIDE 100 MG/ML
1 INJECTION INTRAMUSCULAR; INTRAVENOUS
Qty: 1000 | Refills: 0 | Status: DISCONTINUED | OUTPATIENT
Start: 2018-10-26 | End: 2018-10-26

## 2018-10-26 RX ORDER — SODIUM BICARBONATE 1 MEQ/ML
50 SYRINGE (ML) INTRAVENOUS ONCE
Qty: 0 | Refills: 0 | Status: COMPLETED | OUTPATIENT
Start: 2018-10-26 | End: 2018-10-26

## 2018-10-26 RX ORDER — AZITHROMYCIN 500 MG/1
TABLET, FILM COATED ORAL
Qty: 0 | Refills: 0 | Status: COMPLETED | OUTPATIENT
Start: 2018-10-26 | End: 2018-10-28

## 2018-10-26 RX ORDER — CHLORHEXIDINE GLUCONATE 213 G/1000ML
15 SOLUTION TOPICAL
Qty: 0 | Refills: 0 | Status: DISCONTINUED | OUTPATIENT
Start: 2018-10-26 | End: 2018-10-30

## 2018-10-26 RX ORDER — ESMOLOL HCL 100MG/10ML
50 VIAL (ML) INTRAVENOUS
Qty: 2500 | Refills: 0 | Status: DISCONTINUED | OUTPATIENT
Start: 2018-10-26 | End: 2018-10-26

## 2018-10-26 RX ORDER — NICARDIPINE HYDROCHLORIDE 30 MG/1
5 CAPSULE, EXTENDED RELEASE ORAL
Qty: 40 | Refills: 0 | Status: DISCONTINUED | OUTPATIENT
Start: 2018-10-26 | End: 2018-10-26

## 2018-10-26 RX ORDER — ALBUTEROL 90 UG/1
4 AEROSOL, METERED ORAL
Qty: 0 | Refills: 0 | Status: DISCONTINUED | OUTPATIENT
Start: 2018-10-26 | End: 2018-10-26

## 2018-10-26 RX ORDER — ALBUTEROL 90 UG/1
4 AEROSOL, METERED ORAL
Qty: 0 | Refills: 0 | Status: DISCONTINUED | OUTPATIENT
Start: 2018-10-26 | End: 2018-10-27

## 2018-10-26 RX ADMIN — PROPOFOL 5.99 MICROGRAM(S)/KG/MIN: 10 INJECTION, EMULSION INTRAVENOUS at 23:05

## 2018-10-26 RX ADMIN — PROPOFOL 5.99 MICROGRAM(S)/KG/MIN: 10 INJECTION, EMULSION INTRAVENOUS at 16:29

## 2018-10-26 RX ADMIN — Medication 50 GRAM(S): at 16:37

## 2018-10-26 RX ADMIN — Medication 3 MILLILITER(S): at 09:52

## 2018-10-26 RX ADMIN — PANTOPRAZOLE SODIUM 40 MILLIGRAM(S): 20 TABLET, DELAYED RELEASE ORAL at 18:16

## 2018-10-26 RX ADMIN — KETAMINE HYDROCHLORIDE 24.95 MG/KG/HR: 100 INJECTION INTRAMUSCULAR; INTRAVENOUS at 18:56

## 2018-10-26 RX ADMIN — SODIUM CHLORIDE 75 MILLILITER(S): 9 INJECTION INTRAMUSCULAR; INTRAVENOUS; SUBCUTANEOUS at 17:03

## 2018-10-26 RX ADMIN — Medication 125 MILLIGRAM(S): at 09:03

## 2018-10-26 RX ADMIN — ALBUTEROL 4 PUFF(S): 90 AEROSOL, METERED ORAL at 20:57

## 2018-10-26 RX ADMIN — INSULIN HUMAN 10 UNIT(S): 100 INJECTION, SOLUTION SUBCUTANEOUS at 10:39

## 2018-10-26 RX ADMIN — CEFTRIAXONE 100 GRAM(S): 500 INJECTION, POWDER, FOR SOLUTION INTRAMUSCULAR; INTRAVENOUS at 16:40

## 2018-10-26 RX ADMIN — Medication 1 PUFF(S): at 20:57

## 2018-10-26 RX ADMIN — Medication 5 MILLIGRAM(S): at 10:41

## 2018-10-26 RX ADMIN — Medication 50 MILLIEQUIVALENT(S): at 14:52

## 2018-10-26 RX ADMIN — Medication 125 MILLIGRAM(S): at 14:30

## 2018-10-26 RX ADMIN — Medication 200 GRAM(S): at 14:30

## 2018-10-26 RX ADMIN — HEPARIN SODIUM 5000 UNIT(S): 5000 INJECTION INTRAVENOUS; SUBCUTANEOUS at 22:12

## 2018-10-26 RX ADMIN — Medication 3 MILLILITER(S): at 09:54

## 2018-10-26 RX ADMIN — Medication 125 MILLIGRAM(S): at 21:16

## 2018-10-26 RX ADMIN — ALBUTEROL 4 PUFF(S): 90 AEROSOL, METERED ORAL at 23:27

## 2018-10-26 RX ADMIN — CHLORHEXIDINE GLUCONATE 15 MILLILITER(S): 213 SOLUTION TOPICAL at 18:15

## 2018-10-26 RX ADMIN — AZITHROMYCIN 255 MILLIGRAM(S): 500 TABLET, FILM COATED ORAL at 14:30

## 2018-10-26 RX ADMIN — PROPOFOL 5.99 MICROGRAM(S)/KG/MIN: 10 INJECTION, EMULSION INTRAVENOUS at 19:36

## 2018-10-26 RX ADMIN — INSULIN HUMAN 10 UNIT(S)/HR: 100 INJECTION, SOLUTION SUBCUTANEOUS at 14:49

## 2018-10-26 RX ADMIN — Medication 10 MILLIGRAM(S): at 11:45

## 2018-10-26 NOTE — H&P ADULT - HISTORY OF PRESENT ILLNESS
Pt is a 78 year old male with a PMHx COPD, Asthma, Diabetes, Chronic Hypoxemic Respiratory Failure, CAD (s/p CABG) who was brought to the ED by EMS because of SOB. Over the last 4 days the patient endorsed a cough and has become increasingly SOB. He continued to worsen and refused to come to the hospital yesterday (10/25) when his wife suggested it. This AM he went to the bathroom and was so SOB that he could not get off the toilet at which point his wife called EMS.    In the ED, pt was noted to be in respiratory distress likely secondary to hypercapnia. Bipap was started and nebulizer treatments, iv steroid given were given. ICU was consulted.

## 2018-10-26 NOTE — PROCEDURE NOTE - NSINFORMCONSENT_GEN_A_CORE
Benefits, risks, and possible complications of procedure explained to patient/caregiver who verbalized understanding and gave written consent.
This was an emergent procedure.
Benefits, risks, and possible complications of procedure explained to patient/caregiver who verbalized understanding and gave verbal consent.
This was an emergent procedure.

## 2018-10-26 NOTE — CONSULT NOTE ADULT - SUBJECTIVE AND OBJECTIVE BOX
Patient is a 78y old  Male who presents with a chief complaint of     BRIEF HOSPITAL COURSE:  79 y/o male pmhx HTN, PVD, DM, COPD on 2L home O2, hx CABGx3, HLD presented to ED w/ SOB and increased work of breathing.  Pt arrived by EMS on CPAP, switched to BiPAP in ED, on initial presentation patient lethargic w/ increased work of breathing. As per wife, patient has had URI symptoms the past few days w/ non productive cough, nasal congestion. She denies fever/chills, N/V, CP, abdominal pain, N/V, headache or dizziness.     Events last 24 hours: ***    PAST MEDICAL & SURGICAL HISTORY:  PVD (peripheral vascular disease)  Hypertension  COPD (chronic obstructive pulmonary disease)  Osteomyelitis  Dyslipidemia  CAD (Coronary Artery Disease)  Diabetes Mellitus, Type II  CABG (Coronary Artery Bypass Graft)    Allergies    No Known Allergies    Intolerances    shellfish (Nausea)    FAMILY HISTORY:  Family history of diabetes mellitus (Sibling)      Review of Systems:  CONSTITUTIONAL: No fever, chills, or fatigue  EYES: No eye pain, visual disturbances, or discharge  ENMT:  No difficulty hearing, tinnitus, vertigo; No sinus or throat pain  NECK: No pain or stiffness  RESPIRATORY: No cough, wheezing, chills or hemoptysis; No shortness of breath  CARDIOVASCULAR: No chest pain, palpitations, dizziness, or leg swelling  GASTROINTESTINAL: No abdominal or epigastric pain. No nausea, vomiting, or hematemesis; No diarrhea or constipation. No melena or hematochezia.  GENITOURINARY: No dysuria, frequency, hematuria, or incontinence  NEUROLOGICAL: No headaches, memory loss, loss of strength, numbness, or tremors  SKIN: No itching, burning, rashes, or lesions   MUSCULOSKELETAL: No joint pain or swelling; No muscle, back, or extremity pain  PSYCHIATRIC: No depression, anxiety, mood swings, or difficulty sleeping      Medications:  azithromycin  IVPB      azithromycin  IVPB 500 milliGRAM(s) IV Intermittent once  cefTRIAXone   IVPB 1 Gram(s) IV Intermittent every 24 hours      ALBUTerol    90 MICROgram(s) HFA Inhaler 1 Puff(s) Inhalation every 2 hours  ALBUTerol/ipratropium for Nebulization 3 milliLiter(s) Nebulizer every 6 hours              dextrose 40% Gel 15 Gram(s) Oral once PRN  dextrose 50% Injectable 12.5 Gram(s) IV Push once  dextrose 50% Injectable 25 Gram(s) IV Push once  dextrose 50% Injectable 25 Gram(s) IV Push once  glucagon  Injectable 1 milliGRAM(s) IntraMuscular once PRN  insulin lispro (HumaLOG) corrective regimen sliding scale   SubCutaneous every 6 hours  methylPREDNISolone sodium succinate Injectable 125 milliGRAM(s) IV Push every 8 hours    dextrose 5%. 1000 milliLiter(s) IV Continuous <Continuous>  sodium bicarbonate  Injectable 50 milliEquivalent(s) IV Push once            Mode: AC/ CMV (Assist Control/ Continuous Mandatory Ventilation)  RR (machine): 10  TV (machine): 500  FiO2: 40  PEEP: 5  ITime: 1      ICU Vital Signs Last 24 Hrs  T(C): 36.8 (26 Oct 2018 10:32), Max: 36.8 (26 Oct 2018 10:32)  T(F): 98.2 (26 Oct 2018 10:32), Max: 98.2 (26 Oct 2018 10:32)  HR: 115 (26 Oct 2018 13:01) (106 - 127)  BP: 200/102 (26 Oct 2018 11:04) (186/88 - 203/88)  BP(mean): --  ABP: --  ABP(mean): --  RR: 19 (26 Oct 2018 11:04) (17 - 32)  SpO2: 99% (26 Oct 2018 13:01) (98% - 100%)    Vital Signs Last 24 Hrs  T(C): 36.8 (26 Oct 2018 10:32), Max: 36.8 (26 Oct 2018 10:32)  T(F): 98.2 (26 Oct 2018 10:32), Max: 98.2 (26 Oct 2018 10:32)  HR: 115 (26 Oct 2018 13:01) (106 - 127)  BP: 200/102 (26 Oct 2018 11:04) (186/88 - 203/88)  BP(mean): --  RR: 19 (26 Oct 2018 11:04) (17 - 32)  SpO2: 99% (26 Oct 2018 13:01) (98% - 100%)    ABG - ( 26 Oct 2018 09:32 )  pH, Arterial: 7.10  pH, Blood: x     /  pCO2: 99    /  pO2: 147   / HCO3: 22    / Base Excess: 0.5   /  SaO2: 98                  I&O's Detail        LABS:                        12.6   15.31 )-----------( 319      ( 26 Oct 2018 12:58 )             41.1     10-26    134<L>  |  100  |  19  ----------------------------<  310<H>  5.0   |  26  |  1.20    Ca    8.8      26 Oct 2018 08:48    TPro  7.5  /  Alb  4.0  /  TBili  0.3  /  DBili  x   /  AST  32  /  ALT  31  /  AlkPhos  107  10-26          CAPILLARY BLOOD GLUCOSE      POCT Blood Glucose.: 342 mg/dL (26 Oct 2018 08:55)    PT/INR - ( 26 Oct 2018 08:48 )   PT: 10.4 sec;   INR: 0.96 ratio         PTT - ( 26 Oct 2018 08:48 )  PTT:34.0 sec    CULTURES:      Physical Examination:    General: No acute distress.  Alert, oriented, interactive, nonfocal    HEENT: Pupils equal, reactive to light.  Symmetric.    PULM: Clear to auscultation bilaterally, no significant sputum production    CVS: Regular rate and rhythm, no murmurs, rubs, or gallops    ABD: Soft, nondistended, nontender, normoactive bowel sounds, no masses    EXT: No edema, nontender    SKIN: Warm and well perfused, no rashes noted.    NEURO: A&Ox3, strength 5/5 all extremities, cranial nerves grossly intact, no focal deficits    RADIOLOGY: ***    CRITICAL CARE TIME SPENT: ***  Evaluating/treating patient, reviewing data/labs/imaging, discussing case with multidisciplinary team, discussing plan/goals of care with patient/family. Non-inclusive of procedure time. Patient is a 78y old  Male who presents with a chief complaint of     BRIEF HOSPITAL COURSE:  79 y/o male pmhx HTN, PVD, DM, COPD on 2L home O2, hx CABGx3, HLD presented to ED w/ SOB and increased work of breathing.  Pt arrived by EMS on CPAP, switched to BiPAP in ED, on initial presentation patient lethargic w/ increased work of breathing. As per wife, patient has had URI symptoms the past few days w/ non productive cough, nasal congestion. She denies fever/chills, N/V, CP, abdominal pain, N/V, headache or dizziness.  Last night he kept asking his wife to increase his O2 because he was short of breath, this am patient was found to be very lethargic by wife and she called EMS.  Pt has had multiple admissions in the past for recurrent COPD exacerbations.     Events last 24 hours: ***    PAST MEDICAL & SURGICAL HISTORY:  PVD (peripheral vascular disease)  Hypertension  COPD (chronic obstructive pulmonary disease)  Osteomyelitis  Dyslipidemia  CAD (Coronary Artery Disease)  Diabetes Mellitus, Type II  CABG (Coronary Artery Bypass Graft)    Allergies    No Known Allergies    Intolerances    shellfish (Nausea)    FAMILY HISTORY:  Family history of diabetes mellitus (Sibling)      Review of Systems:  CONSTITUTIONAL: No fever, chills, or fatigue  EYES: No eye pain, visual disturbances, or discharge  ENMT:  No difficulty hearing, tinnitus, vertigo; No sinus or throat pain  NECK: No pain or stiffness  RESPIRATORY: No cough, wheezing, chills or hemoptysis; No shortness of breath  CARDIOVASCULAR: No chest pain, palpitations, dizziness, or leg swelling  GASTROINTESTINAL: No abdominal or epigastric pain. No nausea, vomiting, or hematemesis; No diarrhea or constipation. No melena or hematochezia.  GENITOURINARY: No dysuria, frequency, hematuria, or incontinence  NEUROLOGICAL: No headaches, memory loss, loss of strength, numbness, or tremors  SKIN: No itching, burning, rashes, or lesions   MUSCULOSKELETAL: No joint pain or swelling; No muscle, back, or extremity pain  PSYCHIATRIC: No depression, anxiety, mood swings, or difficulty sleeping      Medications:  azithromycin  IVPB      azithromycin  IVPB 500 milliGRAM(s) IV Intermittent once  cefTRIAXone   IVPB 1 Gram(s) IV Intermittent every 24 hours      ALBUTerol    90 MICROgram(s) HFA Inhaler 1 Puff(s) Inhalation every 2 hours  ALBUTerol/ipratropium for Nebulization 3 milliLiter(s) Nebulizer every 6 hours              dextrose 40% Gel 15 Gram(s) Oral once PRN  dextrose 50% Injectable 12.5 Gram(s) IV Push once  dextrose 50% Injectable 25 Gram(s) IV Push once  dextrose 50% Injectable 25 Gram(s) IV Push once  glucagon  Injectable 1 milliGRAM(s) IntraMuscular once PRN  insulin lispro (HumaLOG) corrective regimen sliding scale   SubCutaneous every 6 hours  methylPREDNISolone sodium succinate Injectable 125 milliGRAM(s) IV Push every 8 hours    dextrose 5%. 1000 milliLiter(s) IV Continuous <Continuous>  sodium bicarbonate  Injectable 50 milliEquivalent(s) IV Push once            Mode: AC/ CMV (Assist Control/ Continuous Mandatory Ventilation)  RR (machine): 10  TV (machine): 500  FiO2: 40  PEEP: 5  ITime: 1      ICU Vital Signs Last 24 Hrs  T(C): 36.8 (26 Oct 2018 10:32), Max: 36.8 (26 Oct 2018 10:32)  T(F): 98.2 (26 Oct 2018 10:32), Max: 98.2 (26 Oct 2018 10:32)  HR: 115 (26 Oct 2018 13:01) (106 - 127)  BP: 200/102 (26 Oct 2018 11:04) (186/88 - 203/88)  BP(mean): --  ABP: --  ABP(mean): --  RR: 19 (26 Oct 2018 11:04) (17 - 32)  SpO2: 99% (26 Oct 2018 13:01) (98% - 100%)    Vital Signs Last 24 Hrs  T(C): 36.8 (26 Oct 2018 10:32), Max: 36.8 (26 Oct 2018 10:32)  T(F): 98.2 (26 Oct 2018 10:32), Max: 98.2 (26 Oct 2018 10:32)  HR: 115 (26 Oct 2018 13:01) (106 - 127)  BP: 200/102 (26 Oct 2018 11:04) (186/88 - 203/88)  BP(mean): --  RR: 19 (26 Oct 2018 11:04) (17 - 32)  SpO2: 99% (26 Oct 2018 13:01) (98% - 100%)    ABG - ( 26 Oct 2018 09:32 )  pH, Arterial: 7.10  pH, Blood: x     /  pCO2: 99    /  pO2: 147   / HCO3: 22    / Base Excess: 0.5   /  SaO2: 98                  I&O's Detail        LABS:                        12.6   15.31 )-----------( 319      ( 26 Oct 2018 12:58 )             41.1     10-26    134<L>  |  100  |  19  ----------------------------<  310<H>  5.0   |  26  |  1.20    Ca    8.8      26 Oct 2018 08:48    TPro  7.5  /  Alb  4.0  /  TBili  0.3  /  DBili  x   /  AST  32  /  ALT  31  /  AlkPhos  107  10-26          CAPILLARY BLOOD GLUCOSE      POCT Blood Glucose.: 342 mg/dL (26 Oct 2018 08:55)    PT/INR - ( 26 Oct 2018 08:48 )   PT: 10.4 sec;   INR: 0.96 ratio         PTT - ( 26 Oct 2018 08:48 )  PTT:34.0 sec    CULTURES:      Physical Examination:    General: No acute distress.  Alert, oriented, interactive, nonfocal    HEENT: Pupils equal, reactive to light.  Symmetric.    PULM: Clear to auscultation bilaterally, no significant sputum production    CVS: Regular rate and rhythm, no murmurs, rubs, or gallops    ABD: Soft, nondistended, nontender, normoactive bowel sounds, no masses    EXT: No edema, nontender    SKIN: Warm and well perfused, no rashes noted.    NEURO: A&Ox3, strength 5/5 all extremities, cranial nerves grossly intact, no focal deficits    RADIOLOGY: ***    CRITICAL CARE TIME SPENT: ***  Evaluating/treating patient, reviewing data/labs/imaging, discussing case with multidisciplinary team, discussing plan/goals of care with patient/family. Non-inclusive of procedure time. Patient is a 78y old  Male who presents with a chief complaint of     BRIEF HOSPITAL COURSE:  79 y/o male pmhx HTN, PVD, DM, COPD on 2L home O2, hx CABGx3, HLD presented to ED w/ SOB and increased work of breathing.  Pt arrived by EMS on CPAP, switched to BiPAP in ED, on initial presentation patient lethargic w/ increased work of breathing. As per wife, patient has had URI symptoms the past few days w/ non productive cough, nasal congestion. She denies fever/chills, N/V, CP, abdominal pain, N/V, headache or dizziness.  Last night he kept asking his wife to increase his O2 because he was short of breath, this am patient was found to be very lethargic by wife and she called EMS.  Pt has had multiple admissions in the past for recurrent COPD exacerbations. Pt placed on BiPAP, intial ABG 7.05/109/420.     On my arrival to ED, lethargic on BiPAP responding to painful stimuli and opening eyes, repeat ABG done was 7.10/99  , I changed the BiPAP settings to 20/8. Pt was hypertensive w/ , given 5mg Lopressor and found to have     PAST MEDICAL & SURGICAL HISTORY:  PVD (peripheral vascular disease)  Hypertension  COPD (chronic obstructive pulmonary disease)  Osteomyelitis  Dyslipidemia  CAD (Coronary Artery Disease)  Diabetes Mellitus, Type II  CABG (Coronary Artery Bypass Graft)    Allergies    No Known Allergies    Intolerances    shellfish (Nausea)    FAMILY HISTORY:  Family history of diabetes mellitus (Sibling)      Review of Systems:  CONSTITUTIONAL: No fever, chills, or fatigue  EYES: No eye pain, visual disturbances, or discharge  ENMT:  No difficulty hearing, tinnitus, vertigo; No sinus or throat pain  NECK: No pain or stiffness  RESPIRATORY: No cough, wheezing, chills or hemoptysis; No shortness of breath  CARDIOVASCULAR: No chest pain, palpitations, dizziness, or leg swelling  GASTROINTESTINAL: No abdominal or epigastric pain. No nausea, vomiting, or hematemesis; No diarrhea or constipation. No melena or hematochezia.  GENITOURINARY: No dysuria, frequency, hematuria, or incontinence  NEUROLOGICAL: No headaches, memory loss, loss of strength, numbness, or tremors  SKIN: No itching, burning, rashes, or lesions   MUSCULOSKELETAL: No joint pain or swelling; No muscle, back, or extremity pain  PSYCHIATRIC: No depression, anxiety, mood swings, or difficulty sleeping      Medications:  azithromycin  IVPB      azithromycin  IVPB 500 milliGRAM(s) IV Intermittent once  cefTRIAXone   IVPB 1 Gram(s) IV Intermittent every 24 hours      ALBUTerol    90 MICROgram(s) HFA Inhaler 1 Puff(s) Inhalation every 2 hours  ALBUTerol/ipratropium for Nebulization 3 milliLiter(s) Nebulizer every 6 hours              dextrose 40% Gel 15 Gram(s) Oral once PRN  dextrose 50% Injectable 12.5 Gram(s) IV Push once  dextrose 50% Injectable 25 Gram(s) IV Push once  dextrose 50% Injectable 25 Gram(s) IV Push once  glucagon  Injectable 1 milliGRAM(s) IntraMuscular once PRN  insulin lispro (HumaLOG) corrective regimen sliding scale   SubCutaneous every 6 hours  methylPREDNISolone sodium succinate Injectable 125 milliGRAM(s) IV Push every 8 hours    dextrose 5%. 1000 milliLiter(s) IV Continuous <Continuous>  sodium bicarbonate  Injectable 50 milliEquivalent(s) IV Push once            Mode: AC/ CMV (Assist Control/ Continuous Mandatory Ventilation)  RR (machine): 10  TV (machine): 500  FiO2: 40  PEEP: 5  ITime: 1      ICU Vital Signs Last 24 Hrs  T(C): 36.8 (26 Oct 2018 10:32), Max: 36.8 (26 Oct 2018 10:32)  T(F): 98.2 (26 Oct 2018 10:32), Max: 98.2 (26 Oct 2018 10:32)  HR: 115 (26 Oct 2018 13:01) (106 - 127)  BP: 200/102 (26 Oct 2018 11:04) (186/88 - 203/88)  BP(mean): --  ABP: --  ABP(mean): --  RR: 19 (26 Oct 2018 11:04) (17 - 32)  SpO2: 99% (26 Oct 2018 13:01) (98% - 100%)    Vital Signs Last 24 Hrs  T(C): 36.8 (26 Oct 2018 10:32), Max: 36.8 (26 Oct 2018 10:32)  T(F): 98.2 (26 Oct 2018 10:32), Max: 98.2 (26 Oct 2018 10:32)  HR: 115 (26 Oct 2018 13:01) (106 - 127)  BP: 200/102 (26 Oct 2018 11:04) (186/88 - 203/88)  BP(mean): --  RR: 19 (26 Oct 2018 11:04) (17 - 32)  SpO2: 99% (26 Oct 2018 13:01) (98% - 100%)    ABG - ( 26 Oct 2018 09:32 )  pH, Arterial: 7.10  pH, Blood: x     /  pCO2: 99    /  pO2: 147   / HCO3: 22    / Base Excess: 0.5   /  SaO2: 98                  I&O's Detail        LABS:                        12.6   15.31 )-----------( 319      ( 26 Oct 2018 12:58 )             41.1     10-26    134<L>  |  100  |  19  ----------------------------<  310<H>  5.0   |  26  |  1.20    Ca    8.8      26 Oct 2018 08:48    TPro  7.5  /  Alb  4.0  /  TBili  0.3  /  DBili  x   /  AST  32  /  ALT  31  /  AlkPhos  107  10-26          CAPILLARY BLOOD GLUCOSE      POCT Blood Glucose.: 342 mg/dL (26 Oct 2018 08:55)    PT/INR - ( 26 Oct 2018 08:48 )   PT: 10.4 sec;   INR: 0.96 ratio         PTT - ( 26 Oct 2018 08:48 )  PTT:34.0 sec    CULTURES:      Physical Examination:    General: No acute distress.  Alert, oriented, interactive, nonfocal    HEENT: Pupils equal, reactive to light.  Symmetric.    PULM: Clear to auscultation bilaterally, no significant sputum production    CVS: Regular rate and rhythm, no murmurs, rubs, or gallops    ABD: Soft, nondistended, nontender, normoactive bowel sounds, no masses    EXT: No edema, nontender    SKIN: Warm and well perfused, no rashes noted.    NEURO: A&Ox3, strength 5/5 all extremities, cranial nerves grossly intact, no focal deficits    RADIOLOGY: ***    CRITICAL CARE TIME SPENT: ***  Evaluating/treating patient, reviewing data/labs/imaging, discussing case with multidisciplinary team, discussing plan/goals of care with patient/family. Non-inclusive of procedure time. Patient is a 78y old  Male who presents with a chief complaint of     BRIEF HOSPITAL COURSE:  77 y/o male pmhx HTN, PVD, DM, Asthma, COPD on 2L home O2, hx CABGx3, HLD presented to ED w/ SOB and increased work of breathing.  Pt arrived by EMS on CPAP, switched to BiPAP in ED, on initial presentation patient lethargic w/ increased work of breathing. As per wife, patient has had URI symptoms the past few days w/ non productive cough, nasal congestion. She denies fever/chills, N/V, CP, abdominal pain, N/V, headache or dizziness.  Last night he kept asking his wife to increase his O2 because he was short of breath, this am patient was found to be very lethargic by wife and she called EMS.  Pt has had multiple admissions in the past for recurrent COPD exacerbations. Pt placed on BiPAP, intial ABG 7.05/109/420.     On my arrival to ED, lethargic on BiPAP responding to painful stimuli and opening eyes, repeat ABG done was 7.10/99  , I changed the BiPAP settings to 20/8. Pt was hypertensive w/ , given 5mg Lopressor and found to have , given 10 units Insulin. After about 15 minutes on adjusted BiPAP settings, patient awake in bed and following commands. Pt accepted to ICU.   Patient arrived to ICU, given an additional 10mg IV labetalol. As RN was attempting additional access, patient quickly became bradycardic, unresponsive and into PEA arrest. ACLS initiated immediately. Pt received  Epix4, Atropine x1, 2x sodium bicarb, 1x CaGl and 1 D50.  ROSC achieved in approx. 17 minutes. PT intubated during code. ( See code sheet for additional details).     PAST MEDICAL & SURGICAL HISTORY:  PVD (peripheral vascular disease)  Hypertension  COPD (chronic obstructive pulmonary disease)  Osteomyelitis  Dyslipidemia  CAD (Coronary Artery Disease)  Diabetes Mellitus, Type II  CABG (Coronary Artery Bypass Graft)    Allergies    No Known Allergies    Intolerances    shellfish (Nausea)    FAMILY HISTORY:  Family history of diabetes mellitus (Sibling)      Review of Systems:  Unable to obtain due to clinical condition     Medications:  azithromycin  IVPB      azithromycin  IVPB 500 milliGRAM(s) IV Intermittent once  cefTRIAXone   IVPB 1 Gram(s) IV Intermittent every 24 hours  ALBUTerol    90 MICROgram(s) HFA Inhaler 1 Puff(s) Inhalation every 2 hours  ALBUTerol/ipratropium for Nebulization 3 milliLiter(s) Nebulizer every 6 hours  dextrose 40% Gel 15 Gram(s) Oral once PRN  dextrose 50% Injectable 12.5 Gram(s) IV Push once  dextrose 50% Injectable 25 Gram(s) IV Push once  dextrose 50% Injectable 25 Gram(s) IV Push once  glucagon  Injectable 1 milliGRAM(s) IntraMuscular once PRN  insulin lispro (HumaLOG) corrective regimen sliding scale   SubCutaneous every 6 hours  methylPREDNISolone sodium succinate Injectable 125 milliGRAM(s) IV Push every 8 hours  dextrose 5%. 1000 milliLiter(s) IV Continuous <Continuous>  sodium bicarbonate  Injectable 50 milliEquivalent(s) IV Push once            Mode: AC/ CMV (Assist Control/ Continuous Mandatory Ventilation)  RR (machine): 10  TV (machine): 500  FiO2: 40  PEEP: 5  ITime: 1      ICU Vital Signs Last 24 Hrs  T(C): 36.8 (26 Oct 2018 10:32), Max: 36.8 (26 Oct 2018 10:32)  T(F): 98.2 (26 Oct 2018 10:32), Max: 98.2 (26 Oct 2018 10:32)  HR: 115 (26 Oct 2018 13:01) (106 - 127)  BP: 200/102 (26 Oct 2018 11:04) (186/88 - 203/88)  RR: 19 (26 Oct 2018 11:04) (17 - 32)  SpO2: 99% (26 Oct 2018 13:01) (98% - 100%)    Vital Signs Last 24 Hrs  T(C): 36.8 (26 Oct 2018 10:32), Max: 36.8 (26 Oct 2018 10:32)  T(F): 98.2 (26 Oct 2018 10:32), Max: 98.2 (26 Oct 2018 10:32)  HR: 115 (26 Oct 2018 13:01) (106 - 127)  BP: 200/102 (26 Oct 2018 11:04) (186/88 - 203/88)  BP(mean): --  RR: 19 (26 Oct 2018 11:04) (17 - 32)  SpO2: 99% (26 Oct 2018 13:01) (98% - 100%)    ABG - ( 26 Oct 2018 09:32 )  pH, Arterial: 7.10  pH, Blood: x     /  pCO2: 99    /  pO2: 147   / HCO3: 22    / Base Excess: 0.5   /  SaO2: 98                  I&O's Detail        LABS:                        12.6   15.31 )-----------( 319      ( 26 Oct 2018 12:58 )             41.1     10-26    134<L>  |  100  |  19  ----------------------------<  310<H>  5.0   |  26  |  1.20    Ca    8.8      26 Oct 2018 08:48    TPro  7.5  /  Alb  4.0  /  TBili  0.3  /  DBili  x   /  AST  32  /  ALT  31  /  AlkPhos  107  10-26          CAPILLARY BLOOD GLUCOSE      POCT Blood Glucose.: 342 mg/dL (26 Oct 2018 08:55)    PT/INR - ( 26 Oct 2018 08:48 )   PT: 10.4 sec;   INR: 0.96 ratio         PTT - ( 26 Oct 2018 08:48 )  PTT:34.0 sec    CULTURES:      Physical Examination ( Done in ED @ 1130):    General: Awake/alert. Laying in bed on BiPAP w/ moderate respiratory distress     HEENT: Pupils equal, reactive to light.  Symmetric. Sluggish     PULM: Poor air entry  and movement b/l. Diffuse inspiratory/ expiratory wheeze b/l.  No rales/ rhonchi     CVS: + S1/S2. Sinus tachycardia. No murmurs     ABD: Soft, nondistended, nontender, normoactive bowel sounds, no masses    EXT: No edema, nontender    SKIN: Warm and well perfused, no rashes noted.    NEURO: Awake, alert. Follows commands, able to move all extremities.           Physical Exam POST cardiac arrest:    General: Intubated.     HEENT: Pupils equal, reactive to light.  Symmetric. Sluggish     PULM: Poor air entry  and movement b/l . Diffuse inspiratory/ expiratory wheeze b/l.  No rales/ rhonchi     CVS: + S1/S2. Sinus tachycardia. No murmurs     ABD: Soft, nondistended, nontender, normoactive bowel sounds, no masses    EXT: No edema, nontender    SKIN: Warm and well perfused, no rashes noted.    NEURO: Does not follow commands, does not withdraw to painful/ verbal stimuli . + gag reflex. No cough reflex           RADIOLOGY: < from: Xray Chest 1 View-PORTABLE IMMEDIATE (10.26.18 @ 16:08) >  PROCEDURE DATE:  10/26/2018          INTERPRETATION:  Status post right internal jugular line.    AP chest. Prior earlier same day.  Impression:  Left costophrenic excluded. Endotracheal tube nasogastric tube in   position. There has been interval introduction of a right internal   jugular line tip in the superior vena cava. No pneumothorax. No gross   change heart and lungs.    < end of copied text >      CRITICAL CARE TIME SPENT: 65 MIN  Evaluating/treating patient, reviewing data/labs/imaging, discussing case with multidisciplinary team, discussing plan/goals of care with patient/family. Non-inclusive of procedure time.

## 2018-10-26 NOTE — PROCEDURE NOTE - ADDITIONAL PROCEDURE DETAILS
Cuff broken was broken on initial intubation, unable to hear breath sounds, no color change on capnography. Placement of ETT in trachea confirmed w/ glidescope. ETT exchanged via tube exchanger w/ visualization the whole time w/ glidescope. O2 sat maintained >96% during course of exhange
DX: hypercapnic resp failure.  Cardiac arrest

## 2018-10-26 NOTE — PROGRESS NOTE ADULT - SUBJECTIVE AND OBJECTIVE BOX
24 hour events:   77 yo M with Hx asthma, COPD, chronic hypoxemic respiratory failure, CAD with CABG presents with worsening respiratory distress over a few days.  Found to have severe hypercapnic respiratory failure, initially improved on BiPAP.  Transferred to ICU and had bradycardic/PEA arrest.     Review of Systems: LINA    T(F): 96.4 (10-26-18 @ 20:29), Max: 98.2 (10-26-18 @ 10:32)  HR: 87 (10-26-18 @ 19:00) (0 - 144)  BP: 112/63 (10-26-18 @ 19:00) (105/66 - 223/121)  RR: 15 (10-26-18 @ 19:00) (13 - 111)  SpO2: 100% (10-26-18 @ 19:00) (72% - 100%)  Wt(kg): --    Mode: AC/ CMV (Assist Control/ Continuous Mandatory Ventilation), RR (machine): 9, TV (machine): 600, FiO2: 40, PEEP: 5  10-26-18 @ 07:01  -  10-26-18 @ 20:35  --------------------------------------------------------  IN: 845.9 mL / OUT: 0 mL / NET: 845.9 mL        CAPILLARY BLOOD GLUCOSE      POCT Blood Glucose.: 230 mg/dL (26 Oct 2018 20:14)      I&O's Summary    26 Oct 2018 07:01  -  26 Oct 2018 20:35  --------------------------------------------------------  IN: 845.9 mL / OUT: 0 mL / NET: 845.9 mL        Physical Exam:   Gen: critically ill, intubated  Neuro: PERRL, no response to painful stimuli, +gag  Resp: diffuse inspiratory and expiratory wheeze, little air movement  CVS: tachy, regular  Abd: soft, NTND  Skin: cool and dry    Meds:  azithromycin  IVPB   cefTRIAXone   IVPB IV Intermittent      insulin Infusion IV Continuous  methylPREDNISolone sodium succinate Injectable IV Push    ALBUTerol    90 MICROgram(s) HFA Inhaler Inhalation PRN  ALBUTerol    90 MICROgram(s) HFA Inhaler Inhalation  ipratropium 17 MICROgram(s) HFA Inhaler Inhalation    ketamine Infusion. IV Continuous  propofol Infusion IV Continuous        pantoprazole  Injectable IV Push      sodium chloride 0.9%. IV Continuous      chlorhexidine 0.12% Liquid Swish and Spit                              12.6   15.31 )-----------( 319      ( 26 Oct 2018 12:58 )             41.1       10-26    134<L>  |  95<L>  |  21  ----------------------------<  480<HH>  5.5<H>   |  33<H>  |  1.90<H>    Ca    9.6      26 Oct 2018 12:58  Phos  7.1     10-26  Mg     2.0     10-26    TPro  6.9  /  Alb  3.3  /  TBili  0.3  /  DBili  x   /  AST  84<H>  /  ALT  100<H>  /  AlkPhos  97  10-26    Lactate 5.7           10-26 @ 12:58          PT/INR - ( 26 Oct 2018 08:48 )   PT: 10.4 sec;   INR: 0.96 ratio         PTT - ( 26 Oct 2018 08:48 )  PTT:34.0 sec  Urinalysis Basic - ( 26 Oct 2018 14:28 )    Color: Yellow / Appearance: Turbid / S.025 / pH: x  Gluc: x / Ketone: Negative  / Bili: Negative / Urobili: Negative   Blood: x / Protein: 150 mg/dL / Nitrite: Negative   Leuk Esterase: Negative / RBC: 0-2 /HPF / WBC 0-2   Sq Epi: x / Non Sq Epi: Few / Bacteria: Moderate          Rapid RVP Result: NotDetec (10-26 @ 10:58)      Radiology:   < from: Xray Chest 1 View-PORTABLE IMMEDIATE (10.26.18 @ 16:08) >  INTERPRETATION:  Status post right internal jugular line.    AP chest. Prior earlier same day.  Impression:  Left costophrenic excluded. Endotracheal tube nasogastric tube in   position. There has been interval introduction of a right internal   jugular line tip in the superior vena cava. No pneumothorax. No gross   change heart and lungs.    < end of copied text >    < from: Xray Chest 1 View-PORTABLE IMMEDIATE (10.26.18 @ 13:09) >  INTERPRETATION:  Clinical information: Shortness of breath, intubated    Portable study, 12:44 PM    Comparison exam dated 10/26/2018, 8:51 AM.    Cardiac monitor leads overlie the chest. An endotracheal tube is present   with its tip 10 cm above the angelica.    Sternal sutures present. No lobar consolidation effusion or pneumothorax.   No vascular congestion. Left costophrenic angle is not includedon this   study. Heart size within normal limits. No acute osseous abnormalities.    IMPRESSION:    See above report    < end of copied text >      CENTRAL LINE: RIJ TLC  SHARMA: Y  A-LINE: N    GLOBAL ISSUE/BEST PRACTICE:  Analgesia: N  Sedation: Y  CAM-ICU:  LINA  HOB elevation: yes  Stress ulcer prophylaxis: Y  VTE prophylaxis: Y  Glycemic control: Y  Nutrition: N    CODE STATUS: FULL

## 2018-10-26 NOTE — ED PROVIDER NOTE - OBJECTIVE STATEMENT
78 year old male that has a history of diabetes and COPD was brought to the ED by EMS because of SOB. Over the last 4 days the patient has had a cough and has become more SOB. He continued to worsen and refused to come to the hospital yesterday when his wife suggested it. This AM he went to the bathroom and was so SOB that he could not get off the toilet. His wife activated EMS.

## 2018-10-26 NOTE — H&P ADULT - ASSESSMENT
Pt is a 78 year old male with a PMHx COPD, Asthma, Diabetes, Chronic Hypoxemic Respiratory Failure, CAD (s/p CABG) who was brought to the ED by EMS because of SOB.

## 2018-10-26 NOTE — ED ADULT NURSE NOTE - OBJECTIVE STATEMENT
patient comes to ED via ambulance in respiratory distress hx copd with worsening sob over past few days pt come in with cpap in place pt opens eyes to verbal stimuli patient comes to ED via ambulance in respiratory distress hx copd with worsening sob over past few days pt come in with cpap in place pt opens eyes to verbal stimuli wife states patient wasn't feeling well went to the bathroom and became unable to get up wife states patient was awake and speaking when EMS arrived

## 2018-10-26 NOTE — PATIENT PROFILE ADULT - VISION (WITH CORRECTIVE LENSES IF THE PATIENT USUALLY WEARS THEM):
Normal vision: sees adequately in most situations; can see medication labels, newsprint/wears glasses to read

## 2018-10-26 NOTE — ED ADULT NURSE NOTE - NSIMPLEMENTINTERV_GEN_ALL_ED
Implemented All Universal Safety Interventions:  [place] to call system. Call bell, personal items and telephone within reach. Instruct patient to call for assistance. Room bathroom lighting operational. Non-slip footwear when patient is off stretcher. Physically safe environment: no spills, clutter or unnecessary equipment. Stretcher in lowest position, wheels locked, appropriate side rails in place.

## 2018-10-26 NOTE — PROCEDURE NOTE - NSPROCDETAILS_GEN_ALL_CORE
difficult/crash intubation
orogastric/placement confirmed by auscultation/gastric secretions aspirated, placement confirmed
guidewire recovered/sterile dressing applied/ultrasound guidance/sterile technique, catheter placed/lumen(s) aspirated and flushed
Seldinger technique/location identified, draped/prepped, sterile technique used, needle inserted/introduced/positive blood return obtained via catheter/connected to a pressurized flush line

## 2018-10-26 NOTE — PROCEDURE NOTE - NSPROCNAME_GEN_A_CORE
Arterial Puncture/Cannulation
Central Line Insertion
Gastric Intubation/Gastric Lavage
Tracheal Intubation

## 2018-10-26 NOTE — PROGRESS NOTE ADULT - SUBJECTIVE AND OBJECTIVE BOX
CCT 48 min    Status asthmaticus with acute hypercapnic resp failure s/p CPA PEA    MERRILL with hyperkalemia.        PAP 35  Plateau 16  peep 5  auto peep 6  Moving air now    MV increased to approx 7 liters/min.     Will allow for some permissive hypercapnia     High exp time on vent peak flow 80    Hypothermia protocol post arrest.  Has what appears to be purposeful movements in 4 ext , but will not stop sedation to eval MS now due to current lung mechanics.      Reassess later.      D/W pt's wife at [facility] questions answered. CCT 48 min    Status asthmaticus with acute hypercapnic resp failure s/p CPA PEA    MERRILL with hyperkalemia.        PAP 35  Plateau 16  peep 5  auto peep 6  Moving air now    MV increased to approx 7 liters/min.     Will allow for some permissive hypercapnia repeat ABG  liberalize bronchodilators    High exp time on vent peak flow 80    Hypothermia protocol post arrest.  Has what appears to be purposeful movements in 4 ext , but will not stop sedation to eval MS now due to current lung mechanics.      Reassess later    Glycemic control with insulin drip post arrest .      D/W pt's wife at [facility] questions answered.

## 2018-10-26 NOTE — PROCEDURE NOTE - NSPOSTPRCRAD_GEN_A_CORE
post-procedure radiography performed
no pneumothorax/post-procedure radiography performed/central line located in the superior vena cava

## 2018-10-26 NOTE — PROCEDURE NOTE - NSPOSTCAREGUIDE_GEN_A_CORE
Verbal/written post procedure instructions were given to patient/caregiver
Verbal/written post procedure instructions were given to patient/caregiver
Care for catheter as per unit/ICU protocols/Verbal/written post procedure instructions were given to patient/caregiver
Care for catheter as per unit/ICU protocols

## 2018-10-26 NOTE — H&P ADULT - NSHPPHYSICALEXAM_GEN_ALL_CORE
Appearance: ILL APPEARING, gag reflex intact  Distress: MODERATE  Mentation: ALTERED LOC  EYES: Clear bilaterally, pupils equal, round and reactive to light.  CARDIAC: Normal rate, regular rhythm.  Heart sounds S1, S2.  No murmurs, rubs or gallops.  Respiratory Distress: YES   Breath Sounds: WHEEZES  Wheezes: DIFFUSE  MUSCULOSKELETAL: Spine appears normal, range of motion is not limited, no muscle or joint tenderness, no edema, no calf tenderness  NEUROLOGICAL: Oriented to person, no focal deficits, no motor or sensory deficits.  SKIN: Skin normal color for race, warm, diaphoretic and intact. No evidence of rash.

## 2018-10-26 NOTE — H&P ADULT - PROBLEM SELECTOR PLAN 1
Admit to ICU  On Bipap  IV steroid  Nebulizer  Serial ABGs  Maintain high index of suspicion for decompensation

## 2018-10-26 NOTE — ED PROVIDER NOTE - PROGRESS NOTE DETAILS
Patient is less confused now and following commands. Evaluated by ICU and accepted. Responding to Bipap, second ABG improved from the first.

## 2018-10-26 NOTE — PROGRESS NOTE ADULT - ATTENDING COMMENTS
77 yo M with Hx asthma, COPD, chronic hypoxemic respiratory failure, CAD with status asthmaticus and profound hypercapnic respiratory failure, complicated by PEA cardiac arrest (likely due to hypercapnic acidosis), anoxic encephalopathy, MERRILL, severe hyperglycemia.    --anoxic encephalopathy s/p cardiac arrest  targeted temperature management goal 36C  CTH if stable  required heavy sedation due to vent bucking and tenuous respiratory status, sedated with propofol and ketamine   --s/p cardiac arrest, likely due to profound acidosis and hypercapnia  trend cardiac enzymes and EKG though doubt ACS  check echo  continue home ASA, plavix   --status asthmaticus with profound hypercapnic respiratory failure  numerous vent adjustments to maximize expiration and minimize autopeep, serial ABG  on AC/9/600/40/5 with peak flow rate 80, Ppeak mid 30s, intrinsic PEEP of 5 for total PEEP of 10  high dose steroids, q2h bronchodilators  ketamine gtt for sedation and bronchodilator properties (fixed dose)  check sputum Cx, urine legionella Ag  empiric azithro and CTX for now  place [place]  --NPO for now  --MERRILL s/p cardiac arrest  prerenal v ATN  IV hydration  --DM type 2 uncontrolled, requiring insulin gtt  --discussed plan with wife in detail  --pt critically ill, total CC time 180min

## 2018-10-26 NOTE — ED PROVIDER NOTE - MEDICAL DECISION MAKING DETAILS
Pt in resp distress, likely hypercarbic, bipap started, labs, nebulizer treatments, iv steroid given, ICU consulted.

## 2018-10-27 LAB
ALBUMIN SERPL ELPH-MCNC: 3.2 G/DL — LOW (ref 3.3–5)
ALP SERPL-CCNC: 81 U/L — SIGNIFICANT CHANGE UP (ref 40–120)
ALT FLD-CCNC: 92 U/L — HIGH (ref 12–78)
ANION GAP SERPL CALC-SCNC: 4 MMOL/L — LOW (ref 5–17)
ANION GAP SERPL CALC-SCNC: 5 MMOL/L — SIGNIFICANT CHANGE UP (ref 5–17)
APTT BLD: 31.9 SEC — SIGNIFICANT CHANGE UP (ref 27.5–37.4)
AST SERPL-CCNC: 55 U/L — HIGH (ref 15–37)
BASE EXCESS BLDA CALC-SCNC: 4.2 MMOL/L — HIGH (ref -2–2)
BASE EXCESS BLDA CALC-SCNC: 5.5 MMOL/L — HIGH (ref -2–2)
BASE EXCESS BLDA CALC-SCNC: 5.6 MMOL/L — HIGH (ref -2–2)
BASOPHILS # BLD AUTO: 0.02 K/UL — SIGNIFICANT CHANGE UP (ref 0–0.2)
BASOPHILS NFR BLD AUTO: 0.1 % — SIGNIFICANT CHANGE UP (ref 0–2)
BILIRUB SERPL-MCNC: 0.3 MG/DL — SIGNIFICANT CHANGE UP (ref 0.2–1.2)
BLOOD GAS COMMENTS ARTERIAL: SIGNIFICANT CHANGE UP
BUN SERPL-MCNC: 21 MG/DL — SIGNIFICANT CHANGE UP (ref 7–23)
BUN SERPL-MCNC: 22 MG/DL — SIGNIFICANT CHANGE UP (ref 7–23)
CALCIUM SERPL-MCNC: 9.4 MG/DL — SIGNIFICANT CHANGE UP (ref 8.5–10.1)
CALCIUM SERPL-MCNC: 9.7 MG/DL — SIGNIFICANT CHANGE UP (ref 8.5–10.1)
CHLORIDE SERPL-SCNC: 103 MMOL/L — SIGNIFICANT CHANGE UP (ref 96–108)
CHLORIDE SERPL-SCNC: 103 MMOL/L — SIGNIFICANT CHANGE UP (ref 96–108)
CO2 SERPL-SCNC: 32 MMOL/L — HIGH (ref 22–31)
CO2 SERPL-SCNC: 34 MMOL/L — HIGH (ref 22–31)
CREAT SERPL-MCNC: 1.2 MG/DL — SIGNIFICANT CHANGE UP (ref 0.5–1.3)
CREAT SERPL-MCNC: 1.2 MG/DL — SIGNIFICANT CHANGE UP (ref 0.5–1.3)
CULTURE RESULTS: NO GROWTH — SIGNIFICANT CHANGE UP
EOSINOPHIL # BLD AUTO: 0 K/UL — SIGNIFICANT CHANGE UP (ref 0–0.5)
EOSINOPHIL NFR BLD AUTO: 0 % — SIGNIFICANT CHANGE UP (ref 0–6)
GLUCOSE SERPL-MCNC: 125 MG/DL — HIGH (ref 70–99)
GLUCOSE SERPL-MCNC: 193 MG/DL — HIGH (ref 70–99)
GRAM STN FLD: SIGNIFICANT CHANGE UP
HBA1C BLD-MCNC: 6.2 % — HIGH (ref 4–5.6)
HCO3 BLDA-SCNC: 26 MMOL/L — SIGNIFICANT CHANGE UP (ref 23–27)
HCO3 BLDA-SCNC: 28 MMOL/L — HIGH (ref 23–27)
HCO3 BLDA-SCNC: 28 MMOL/L — HIGH (ref 23–27)
HCT VFR BLD CALC: 37.8 % — LOW (ref 39–50)
HGB BLD-MCNC: 11.8 G/DL — LOW (ref 13–17)
HOROWITZ INDEX BLDA+IHG-RTO: 30 — SIGNIFICANT CHANGE UP
HOROWITZ INDEX BLDA+IHG-RTO: 30 — SIGNIFICANT CHANGE UP
HOROWITZ INDEX BLDA+IHG-RTO: 40 — SIGNIFICANT CHANGE UP
IMM GRANULOCYTES NFR BLD AUTO: 0.8 % — SIGNIFICANT CHANGE UP (ref 0–1.5)
INR BLD: 1.01 RATIO — SIGNIFICANT CHANGE UP (ref 0.88–1.16)
LACTATE SERPL-SCNC: 1.5 MMOL/L — SIGNIFICANT CHANGE UP (ref 0.7–2)
LEGIONELLA AG UR QL: NEGATIVE — SIGNIFICANT CHANGE UP
LYMPHOCYTES # BLD AUTO: 0.61 K/UL — LOW (ref 1–3.3)
LYMPHOCYTES # BLD AUTO: 3.2 % — LOW (ref 13–44)
MAGNESIUM SERPL-MCNC: 1.9 MG/DL — SIGNIFICANT CHANGE UP (ref 1.6–2.6)
MAGNESIUM SERPL-MCNC: 2.1 MG/DL — SIGNIFICANT CHANGE UP (ref 1.6–2.6)
MCHC RBC-ENTMCNC: 27.7 PG — SIGNIFICANT CHANGE UP (ref 27–34)
MCHC RBC-ENTMCNC: 31.2 GM/DL — LOW (ref 32–36)
MCV RBC AUTO: 88.7 FL — SIGNIFICANT CHANGE UP (ref 80–100)
MONOCYTES # BLD AUTO: 1.34 K/UL — HIGH (ref 0–0.9)
MONOCYTES NFR BLD AUTO: 7.1 % — SIGNIFICANT CHANGE UP (ref 2–14)
NEUTROPHILS # BLD AUTO: 16.85 K/UL — HIGH (ref 1.8–7.4)
NEUTROPHILS NFR BLD AUTO: 88.8 % — HIGH (ref 43–77)
PCO2 BLDA: 64 MMHG — HIGH (ref 32–46)
PCO2 BLDA: 65 MMHG — HIGH (ref 32–46)
PCO2 BLDA: 75 MMHG — CRITICAL HIGH (ref 32–46)
PH BLDA: 7.24 — LOW (ref 7.35–7.45)
PH BLDA: 7.31 — LOW (ref 7.35–7.45)
PH BLDA: 7.32 — LOW (ref 7.35–7.45)
PHOSPHATE SERPL-MCNC: 3 MG/DL — SIGNIFICANT CHANGE UP (ref 2.5–4.5)
PHOSPHATE SERPL-MCNC: 3.1 MG/DL — SIGNIFICANT CHANGE UP (ref 2.5–4.5)
PLATELET # BLD AUTO: 264 K/UL — SIGNIFICANT CHANGE UP (ref 150–400)
PO2 BLDA: 213 MMHG — HIGH (ref 74–108)
PO2 BLDA: 82 MMHG — SIGNIFICANT CHANGE UP (ref 74–108)
PO2 BLDA: 86 MMHG — SIGNIFICANT CHANGE UP (ref 74–108)
POTASSIUM SERPL-MCNC: 4.8 MMOL/L — SIGNIFICANT CHANGE UP (ref 3.5–5.3)
POTASSIUM SERPL-MCNC: 5.1 MMOL/L — SIGNIFICANT CHANGE UP (ref 3.5–5.3)
POTASSIUM SERPL-SCNC: 4.8 MMOL/L — SIGNIFICANT CHANGE UP (ref 3.5–5.3)
POTASSIUM SERPL-SCNC: 5.1 MMOL/L — SIGNIFICANT CHANGE UP (ref 3.5–5.3)
PROT SERPL-MCNC: 6.7 G/DL — SIGNIFICANT CHANGE UP (ref 6–8.3)
PROTHROM AB SERPL-ACNC: 11 SEC — SIGNIFICANT CHANGE UP (ref 9.8–12.7)
RBC # BLD: 4.26 M/UL — SIGNIFICANT CHANGE UP (ref 4.2–5.8)
RBC # FLD: 13.3 % — SIGNIFICANT CHANGE UP (ref 10.3–14.5)
SAO2 % BLDA: 96 % — SIGNIFICANT CHANGE UP (ref 92–96)
SAO2 % BLDA: 96 % — SIGNIFICANT CHANGE UP (ref 92–96)
SAO2 % BLDA: 99 % — HIGH (ref 92–96)
SODIUM SERPL-SCNC: 140 MMOL/L — SIGNIFICANT CHANGE UP (ref 135–145)
SODIUM SERPL-SCNC: 141 MMOL/L — SIGNIFICANT CHANGE UP (ref 135–145)
SPECIMEN SOURCE: SIGNIFICANT CHANGE UP
SPECIMEN SOURCE: SIGNIFICANT CHANGE UP
WBC # BLD: 18.97 K/UL — HIGH (ref 3.8–10.5)
WBC # FLD AUTO: 18.97 K/UL — HIGH (ref 3.8–10.5)

## 2018-10-27 PROCEDURE — 99291 CRITICAL CARE FIRST HOUR: CPT

## 2018-10-27 PROCEDURE — 93010 ELECTROCARDIOGRAM REPORT: CPT

## 2018-10-27 RX ORDER — ALBUTEROL 90 UG/1
4 AEROSOL, METERED ORAL EVERY 4 HOURS
Qty: 0 | Refills: 0 | Status: DISCONTINUED | OUTPATIENT
Start: 2018-10-27 | End: 2018-10-27

## 2018-10-27 RX ORDER — MAGNESIUM SULFATE 500 MG/ML
2 VIAL (ML) INJECTION ONCE
Qty: 0 | Refills: 0 | Status: COMPLETED | OUTPATIENT
Start: 2018-10-27 | End: 2018-10-27

## 2018-10-27 RX ORDER — KETAMINE HYDROCHLORIDE 100 MG/ML
0.4 INJECTION INTRAMUSCULAR; INTRAVENOUS
Qty: 1000 | Refills: 0 | Status: DISCONTINUED | OUTPATIENT
Start: 2018-10-27 | End: 2018-10-28

## 2018-10-27 RX ORDER — IPRATROPIUM/ALBUTEROL SULFATE 18-103MCG
3 AEROSOL WITH ADAPTER (GRAM) INHALATION EVERY 4 HOURS
Qty: 0 | Refills: 0 | Status: DISCONTINUED | OUTPATIENT
Start: 2018-10-27 | End: 2018-10-31

## 2018-10-27 RX ORDER — DEXMEDETOMIDINE HYDROCHLORIDE IN 0.9% SODIUM CHLORIDE 4 UG/ML
0.2 INJECTION INTRAVENOUS
Qty: 200 | Refills: 0 | Status: DISCONTINUED | OUTPATIENT
Start: 2018-10-27 | End: 2018-10-27

## 2018-10-27 RX ORDER — IPRATROPIUM BROMIDE 0.2 MG/ML
4 SOLUTION, NON-ORAL INHALATION
Qty: 0 | Refills: 0 | Status: DISCONTINUED | OUTPATIENT
Start: 2018-10-27 | End: 2018-10-27

## 2018-10-27 RX ORDER — IPRATROPIUM BROMIDE 0.2 MG/ML
4 SOLUTION, NON-ORAL INHALATION EVERY 6 HOURS
Qty: 0 | Refills: 0 | Status: DISCONTINUED | OUTPATIENT
Start: 2018-10-27 | End: 2018-10-27

## 2018-10-27 RX ORDER — MIDAZOLAM HYDROCHLORIDE 1 MG/ML
2 INJECTION, SOLUTION INTRAMUSCULAR; INTRAVENOUS ONCE
Qty: 0 | Refills: 0 | Status: DISCONTINUED | OUTPATIENT
Start: 2018-10-27 | End: 2018-10-27

## 2018-10-27 RX ORDER — ALBUTEROL 90 UG/1
4 AEROSOL, METERED ORAL
Qty: 0 | Refills: 0 | Status: DISCONTINUED | OUTPATIENT
Start: 2018-10-27 | End: 2018-10-28

## 2018-10-27 RX ORDER — TAMSULOSIN HYDROCHLORIDE 0.4 MG/1
0.4 CAPSULE ORAL AT BEDTIME
Qty: 0 | Refills: 0 | Status: DISCONTINUED | OUTPATIENT
Start: 2018-10-27 | End: 2018-11-02

## 2018-10-27 RX ADMIN — PROPOFOL 5.99 MICROGRAM(S)/KG/MIN: 10 INJECTION, EMULSION INTRAVENOUS at 02:06

## 2018-10-27 RX ADMIN — ALBUTEROL 4 PUFF(S): 90 AEROSOL, METERED ORAL at 08:14

## 2018-10-27 RX ADMIN — Medication 40 MILLIGRAM(S): at 14:03

## 2018-10-27 RX ADMIN — PANTOPRAZOLE SODIUM 40 MILLIGRAM(S): 20 TABLET, DELAYED RELEASE ORAL at 12:37

## 2018-10-27 RX ADMIN — SODIUM CHLORIDE 75 MILLILITER(S): 9 INJECTION INTRAMUSCULAR; INTRAVENOUS; SUBCUTANEOUS at 21:49

## 2018-10-27 RX ADMIN — Medication 125 MILLIGRAM(S): at 05:27

## 2018-10-27 RX ADMIN — ALBUTEROL 4 PUFF(S): 90 AEROSOL, METERED ORAL at 11:53

## 2018-10-27 RX ADMIN — ALBUTEROL 4 PUFF(S): 90 AEROSOL, METERED ORAL at 19:47

## 2018-10-27 RX ADMIN — TAMSULOSIN HYDROCHLORIDE 0.4 MILLIGRAM(S): 0.4 CAPSULE ORAL at 21:50

## 2018-10-27 RX ADMIN — AZITHROMYCIN 255 MILLIGRAM(S): 500 TABLET, FILM COATED ORAL at 12:36

## 2018-10-27 RX ADMIN — HEPARIN SODIUM 5000 UNIT(S): 5000 INJECTION INTRAVENOUS; SUBCUTANEOUS at 14:03

## 2018-10-27 RX ADMIN — CHLORHEXIDINE GLUCONATE 15 MILLILITER(S): 213 SOLUTION TOPICAL at 19:17

## 2018-10-27 RX ADMIN — HEPARIN SODIUM 5000 UNIT(S): 5000 INJECTION INTRAVENOUS; SUBCUTANEOUS at 21:48

## 2018-10-27 RX ADMIN — ALBUTEROL 4 PUFF(S): 90 AEROSOL, METERED ORAL at 17:08

## 2018-10-27 RX ADMIN — ALBUTEROL 4 PUFF(S): 90 AEROSOL, METERED ORAL at 23:12

## 2018-10-27 RX ADMIN — Medication 3 MILLILITER(S): at 19:43

## 2018-10-27 RX ADMIN — PROPOFOL 5.99 MICROGRAM(S)/KG/MIN: 10 INJECTION, EMULSION INTRAVENOUS at 21:48

## 2018-10-27 RX ADMIN — ALBUTEROL 4 PUFF(S): 90 AEROSOL, METERED ORAL at 02:40

## 2018-10-27 RX ADMIN — INSULIN HUMAN 4 UNIT(S)/HR: 100 INJECTION, SOLUTION SUBCUTANEOUS at 02:05

## 2018-10-27 RX ADMIN — PROPOFOL 5.99 MICROGRAM(S)/KG/MIN: 10 INJECTION, EMULSION INTRAVENOUS at 05:27

## 2018-10-27 RX ADMIN — MIDAZOLAM HYDROCHLORIDE 2 MILLIGRAM(S): 1 INJECTION, SOLUTION INTRAMUSCULAR; INTRAVENOUS at 19:31

## 2018-10-27 RX ADMIN — HEPARIN SODIUM 5000 UNIT(S): 5000 INJECTION INTRAVENOUS; SUBCUTANEOUS at 05:27

## 2018-10-27 RX ADMIN — ALBUTEROL 4 PUFF(S): 90 AEROSOL, METERED ORAL at 21:15

## 2018-10-27 RX ADMIN — DEXMEDETOMIDINE HYDROCHLORIDE IN 0.9% SODIUM CHLORIDE 4.9 MICROGRAM(S)/KG/HR: 4 INJECTION INTRAVENOUS at 05:42

## 2018-10-27 RX ADMIN — PROPOFOL 5.99 MICROGRAM(S)/KG/MIN: 10 INJECTION, EMULSION INTRAVENOUS at 14:21

## 2018-10-27 RX ADMIN — Medication 50 GRAM(S): at 15:07

## 2018-10-27 RX ADMIN — CHLORHEXIDINE GLUCONATE 15 MILLILITER(S): 213 SOLUTION TOPICAL at 05:27

## 2018-10-27 RX ADMIN — Medication 40 MILLIGRAM(S): at 21:48

## 2018-10-27 RX ADMIN — CEFTRIAXONE 100 GRAM(S): 500 INJECTION, POWDER, FOR SOLUTION INTRAMUSCULAR; INTRAVENOUS at 16:54

## 2018-10-27 RX ADMIN — Medication 1 PUFF(S): at 08:15

## 2018-10-27 RX ADMIN — Medication 3 MILLILITER(S): at 23:51

## 2018-10-27 RX ADMIN — SODIUM CHLORIDE 75 MILLILITER(S): 9 INJECTION INTRAMUSCULAR; INTRAVENOUS; SUBCUTANEOUS at 05:26

## 2018-10-27 RX ADMIN — Medication 3 MILLILITER(S): at 14:54

## 2018-10-27 RX ADMIN — PROPOFOL 5.99 MICROGRAM(S)/KG/MIN: 10 INJECTION, EMULSION INTRAVENOUS at 10:22

## 2018-10-27 RX ADMIN — Medication 1 PUFF(S): at 02:40

## 2018-10-27 NOTE — DIETITIAN INITIAL EVALUATION ADULT. - PERTINENT LABORATORY DATA
( 10-27-18) ,[phone], markedly improved overall  from yesterday [phone], 390, 311, 230, 214, 176, 162

## 2018-10-27 NOTE — PROGRESS NOTE ADULT - SUBJECTIVE AND OBJECTIVE BOX
Patient is a 78y old  Male who presents with a chief complaint of status asthmaticus (26 Oct 2018 20:34)    PAST MEDICAL & SURGICAL HISTORY:  PVD (peripheral vascular disease)  Hypertension  COPD (chronic obstructive pulmonary disease)  Osteomyelitis  Dyslipidemia  CAD (Coronary Artery Disease)  Diabetes Mellitus, Type II  CABG (Coronary Artery Bypass Graft)    YUE COMMODORE   78y    Male    BRIEF HOSPITAL COURSE:    Review of Systems:                       All other ROS are negative.    Allergies    No Known Allergies    Intolerances    shellfish (Nausea)    Weight (kg): 98 (10-26-18 @ 11:31)  BMI (kg/m2): 29.3 (10-26-18 @ 11:31)    ICU Vital Signs Last 24 Hrs  T(C): 35.6 (27 Oct 2018 04:09), Max: 36.8 (26 Oct 2018 10:32)  T(F): 96.1 (27 Oct 2018 04:09), Max: 98.2 (26 Oct 2018 10:32)  HR: 86 (27 Oct 2018 04:00) (0 - 144)  BP: 120/69 (27 Oct 2018 04:00) (104/64 - 223/121)  BP(mean): 89 (27 Oct 2018 04:00) (78 - 163)  ABP: 138/60 (27 Oct 2018 04:00) (80/77 - 170/66)  ABP(mean): 86 (27 Oct 2018 04:00) (47 - 126)  RR: 14 (27 Oct 2018 04:00) (13 - 111)  SpO2: 100% (27 Oct 2018 04:00) (72% - 100%)    Physical Examination:    General:     HEENT:     PULM:     CVS:     ABD:     EXT:     SKIN:     Neuro:    ABG - ( 26 Oct 2018 20:52 )  pH, Arterial: 7.28  pH, Blood: x     /  pCO2: 65    /  pO2: 55    / HCO3: 26    / Base Excess: 3.5   /  SaO2: 89                Mode: AC/ CMV (Assist Control/ Continuous Mandatory Ventilation)  RR (machine): 12  TV (machine): 500  FiO2: 40  PEEP: 5  ITime: 1  MAP: 9.6  PIP: 31    Mode: AC/ CMV (Assist Control/ Continuous Mandatory Ventilation), RR (machine): 12, TV (machine): 500, FiO2: 40, PEEP: 5, ITime: 1, MAP: 9.6, PIP: 31  LABS:                        12.6   15.31 )-----------( 319      ( 26 Oct 2018 12:58 )             41.1     10-26    139  |  100  |  26<H>  ----------------------------<  199<H>  4.7   |  32<H>  |  1.70<H>    Ca    9.6      26 Oct 2018 21:39  Phos  2.9     10-26  Mg     2.2     10-26    TPro  6.9  /  Alb  3.3  /  TBili  0.3  /  DBili  x   /  AST  84<H>  /  ALT  100<H>  /  AlkPhos  97  10-26      CARDIAC MARKERS ( 26 Oct 2018 21:39 )  .155 ng/mL / x     / 454 U/L / x     / 19.2 ng/mL      CAPILLARY BLOOD GLUCOSE      POCT Blood Glucose.: 112 mg/dL (27 Oct 2018 04:05)  POCT Blood Glucose.: 120 mg/dL (27 Oct 2018 03:05)  POCT Blood Glucose.: 131 mg/dL (27 Oct 2018 02:02)  POCT Blood Glucose.: 148 mg/dL (27 Oct 2018 01:13)  POCT Blood Glucose.: 158 mg/dL (27 Oct 2018 00:38)  POCT Blood Glucose.: 162 mg/dL (26 Oct 2018 23:10)  POCT Blood Glucose.: 176 mg/dL (26 Oct 2018 22:06)  POCT Blood Glucose.: 214 mg/dL (26 Oct 2018 21:06)  POCT Blood Glucose.: 230 mg/dL (26 Oct 2018 20:14)  POCT Blood Glucose.: 311 mg/dL (26 Oct 2018 19:04)  POCT Blood Glucose.: 340 mg/dL (26 Oct 2018 18:20)  POCT Blood Glucose.: 393 mg/dL (26 Oct 2018 17:06)  POCT Blood Glucose.: 442 mg/dL (26 Oct 2018 16:16)  POCT Blood Glucose.: 342 mg/dL (26 Oct 2018 08:55)    PT/INR - ( 26 Oct 2018 08:48 )   PT: 10.4 sec;   INR: 0.96 ratio         PTT - ( 26 Oct 2018 08:48 )  PTT:34.0 sec  Urinalysis Basic - ( 26 Oct 2018 14:28 )    Color: Yellow / Appearance: Turbid / S.025 / pH: x  Gluc: x / Ketone: Negative  / Bili: Negative / Urobili: Negative   Blood: x / Protein: 150 mg/dL / Nitrite: Negative   Leuk Esterase: Negative / RBC: 0-2 /HPF / WBC 0-2   Sq Epi: x / Non Sq Epi: Few / Bacteria: Moderate      CULTURES:  Rapid RVP Result: Eric (10-26 @ 10:58)      Medications:  MEDICATIONS  (STANDING):  ALBUTerol    90 MICROgram(s) HFA Inhaler 4 Puff(s) Inhalation every 6 hours  azithromycin  IVPB      azithromycin  IVPB 500 milliGRAM(s) IV Intermittent every 24 hours  cefTRIAXone   IVPB 1 Gram(s) IV Intermittent every 24 hours  chlorhexidine 0.12% Liquid 15 milliLiter(s) Swish and Spit two times a day  heparin  Injectable 5000 Unit(s) SubCutaneous every 8 hours  insulin Infusion 0.5 Unit(s)/Hr (0.5 mL/Hr) IV Continuous <Continuous>  ipratropium 17 MICROgram(s) HFA Inhaler 1 Puff(s) Inhalation every 6 hours  ketamine Infusion. 0.5 mG/kG/Hr (24.95 mL/Hr) IV Continuous <Continuous>  methylPREDNISolone sodium succinate Injectable 125 milliGRAM(s) IV Push every 8 hours  pantoprazole  Injectable 40 milliGRAM(s) IV Push daily  propofol Infusion 10 MICROgram(s)/kG/Min (5.988 mL/Hr) IV Continuous <Continuous>  sodium chloride 0.9%. 1000 milliLiter(s) (75 mL/Hr) IV Continuous <Continuous>    MEDICATIONS  (PRN):  ALBUTerol    90 MICROgram(s) HFA Inhaler 4 Puff(s) Inhalation every 2 hours PRN Wheezing        10-26 @ 07:  -  10-27 @ 04:23  --------------------------------------------------------  IN: 1393.1 mL / OUT: 200 mL / NET: 1193.1 mL        RADIOLOGY/IMAGING/ECHO    < from: Xray Chest 1 View-PORTABLE IMMEDIATE (10.26.18 @ 16:08) >    AP chest. Prior earlier same day.  Impression:  Left costophrenic excluded. Endotracheal tube nasogastric tube in   position. There has been interval introduction of a right internal   jugular line tip in the superior vena cava. No pneumothorax. No gross   change heart and lungs.    < end of copied text >      Assessment/Plan:              CRITICAL CARE TIME SPENT: 33 minutes assessing presenting problems of acute illness, which pose high probability of life threatening deterioration or end organ damage/dysfunction, as well as medical decision making including initiating plan of care, reviewing data, reviewing radiologic exams, discussing with multidisciplinary team,  discussing goals of care with patient/family, and writing this note.  Non-inclusive of procedures performed, Patient is a 78y old  Male who presents with a chief complaint of status asthmaticus (26 Oct 2018 20:34)    PAST MEDICAL & SURGICAL HISTORY:  PVD (peripheral vascular disease)  Hypertension  COPD (chronic obstructive pulmonary disease)  Osteomyelitis  Dyslipidemia  CAD (Coronary Artery Disease)  Diabetes Mellitus, Type II  CABG (Coronary Artery Bypass Graft)    YUE COMMODORE   78y    Male    BRIEF HOSPITAL COURSE:    Review of Systems:                       All other ROS are negative.    Allergies    No Known Allergies    Intolerances    shellfish (Nausea)    Weight (kg): 98 (10-26-18 @ 11:31)  BMI (kg/m2): 29.3 (10-26-18 @ 11:31)    ICU Vital Signs Last 24 Hrs  T(C): 35.6 (27 Oct 2018 04:09), Max: 36.8 (26 Oct 2018 10:32)  T(F): 96.1 (27 Oct 2018 04:09), Max: 98.2 (26 Oct 2018 10:32)  HR: 86 (27 Oct 2018 04:00) (0 - 144)  BP: 120/69 (27 Oct 2018 04:00) (104/64 - 223/121)  BP(mean): 89 (27 Oct 2018 04:00) (78 - 163)  ABP: 138/60 (27 Oct 2018 04:00) (80/77 - 170/66)  ABP(mean): 86 (27 Oct 2018 04:00) (47 - 126)  RR: 14 (27 Oct 2018 04:00) (13 - 111)  SpO2: 100% (27 Oct 2018 04:00) (72% - 100%)    Physical Examination:    General:     HEENT:     PULM:     CVS:     ABD:     EXT:     SKIN:     Neuro:    ABG - ( 26 Oct 2018 20:52 )  pH, Arterial: 7.28  pH, Blood: x     /  pCO2: 65    /  pO2: 55    / HCO3: 26    / Base Excess: 3.5   /  SaO2: 89                Mode: AC/ CMV (Assist Control/ Continuous Mandatory Ventilation)  RR (machine): 12  TV (machine): 500  FiO2: 40  PEEP: 5  ITime: 1  MAP: 9.6  PIP: 31    Mode: AC/ CMV (Assist Control/ Continuous Mandatory Ventilation), RR (machine): 12, TV (machine): 500, FiO2: 40, PEEP: 5, ITime: 1, MAP: 9.6, PIP: 31  LABS:                        12.6   15.31 )-----------( 319      ( 26 Oct 2018 12:58 )             41.1     10-26    139  |  100  |  26<H>  ----------------------------<  199<H>  4.7   |  32<H>  |  1.70<H>    Ca    9.6      26 Oct 2018 21:39  Phos  2.9     10-26  Mg     2.2     10-26    TPro  6.9  /  Alb  3.3  /  TBili  0.3  /  DBili  x   /  AST  84<H>  /  ALT  100<H>  /  AlkPhos  97  10-26      CARDIAC MARKERS ( 26 Oct 2018 21:39 )  .155 ng/mL / x     / 454 U/L / x     / 19.2 ng/mL      CAPILLARY BLOOD GLUCOSE      POCT Blood Glucose.: 112 mg/dL (27 Oct 2018 04:05)  POCT Blood Glucose.: 120 mg/dL (27 Oct 2018 03:05)  POCT Blood Glucose.: 131 mg/dL (27 Oct 2018 02:02)  POCT Blood Glucose.: 148 mg/dL (27 Oct 2018 01:13)  POCT Blood Glucose.: 158 mg/dL (27 Oct 2018 00:38)  POCT Blood Glucose.: 162 mg/dL (26 Oct 2018 23:10)  POCT Blood Glucose.: 176 mg/dL (26 Oct 2018 22:06)  POCT Blood Glucose.: 214 mg/dL (26 Oct 2018 21:06)  POCT Blood Glucose.: 230 mg/dL (26 Oct 2018 20:14)  POCT Blood Glucose.: 311 mg/dL (26 Oct 2018 19:04)  POCT Blood Glucose.: 340 mg/dL (26 Oct 2018 18:20)  POCT Blood Glucose.: 393 mg/dL (26 Oct 2018 17:06)  POCT Blood Glucose.: 442 mg/dL (26 Oct 2018 16:16)  POCT Blood Glucose.: 342 mg/dL (26 Oct 2018 08:55)    PT/INR - ( 26 Oct 2018 08:48 )   PT: 10.4 sec;   INR: 0.96 ratio         PTT - ( 26 Oct 2018 08:48 )  PTT:34.0 sec  Urinalysis Basic - ( 26 Oct 2018 14:28 )    Color: Yellow / Appearance: Turbid / S.025 / pH: x  Gluc: x / Ketone: Negative  / Bili: Negative / Urobili: Negative   Blood: x / Protein: 150 mg/dL / Nitrite: Negative   Leuk Esterase: Negative / RBC: 0-2 /HPF / WBC 0-2   Sq Epi: x / Non Sq Epi: Few / Bacteria: Moderate      CULTURES:  Rapid RVP Result: Eric (10-26 @ 10:58)      Medications:  MEDICATIONS  (STANDING):  ALBUTerol    90 MICROgram(s) HFA Inhaler 4 Puff(s) Inhalation every 6 hours  azithromycin  IVPB      azithromycin  IVPB 500 milliGRAM(s) IV Intermittent every 24 hours  cefTRIAXone   IVPB 1 Gram(s) IV Intermittent every 24 hours  chlorhexidine 0.12% Liquid 15 milliLiter(s) Swish and Spit two times a day  heparin  Injectable 5000 Unit(s) SubCutaneous every 8 hours  insulin Infusion 0.5 Unit(s)/Hr (0.5 mL/Hr) IV Continuous <Continuous>  ipratropium 17 MICROgram(s) HFA Inhaler 1 Puff(s) Inhalation every 6 hours  ketamine Infusion. 0.5 mG/kG/Hr (24.95 mL/Hr) IV Continuous <Continuous>  methylPREDNISolone sodium succinate Injectable 125 milliGRAM(s) IV Push every 8 hours  pantoprazole  Injectable 40 milliGRAM(s) IV Push daily  propofol Infusion 10 MICROgram(s)/kG/Min (5.988 mL/Hr) IV Continuous <Continuous>  sodium chloride 0.9%. 1000 milliLiter(s) (75 mL/Hr) IV Continuous <Continuous>    MEDICATIONS  (PRN):  ALBUTerol    90 MICROgram(s) HFA Inhaler 4 Puff(s) Inhalation every 2 hours PRN Wheezing        10-26 @ 07:  -  10-27 @ 04:23  --------------------------------------------------------  IN: 1393.1 mL / OUT: 200 mL / NET: 1193.1 mL        RADIOLOGY/IMAGING/ECHO    < from: Xray Chest 1 View-PORTABLE IMMEDIATE (10.26.18 @ 16:08) >    AP chest. Prior earlier same day.  Impression:  Left costophrenic excluded. Endotracheal tube nasogastric tube in   position. There has been interval introduction of a right internal   jugular line tip in the superior vena cava. No pneumothorax. No gross   change heart and lungs.          Assessment/Plan:      78M  with Hx asthma, COPD, CAD admit 10/26 with SOB for 2 days with status asthmaticus and profound hypercapnic respiratory failure leading to  PEA  cardiac arrest,  anoxic encephalopathy, MERRILL with hyperkalemia  severe hyperglycemia,  and lactic acidosis.     Sedate on vent with propofol ketamine.    Neuro  Hypothermia protocol, will now hold ketamine and propofol to eval MS    Cor  HD stable SR   Pulm PAP  himproved.  No wheeze.  steroids BD.     Gi  PPI feed soon if not extubated   Renal  Non oliguric MERRILL with hyperkalemia  improving with IVF   Lactic acidosis type B related to MERRILL and metformin from home.  He is otherwise warm and perfused.    Heme/DVT added heparin.    ID on ceftriaxone an azithro.  No discrete infiltrate on CXR.  Leave on for now   Endo  hyperglycemia without DKA requiring insulin infusion .   Stress and steroid related.    Lines/tubes RIJ TLC  R radial a-lice 10/26  sites clean               CRITICAL CARE TIME SPENT: 33 minutes assessing presenting problems of acute illness, which pose high probability of life threatening deterioration or end organ damage/dysfunction, as well as medical decision making including initiating plan of care, reviewing data, reviewing radiologic exams, discussing with multidisciplinary team,  discussing goals of care with patient/family, and writing this note.  Non-inclusive of procedures performed, Patient is a 78y old  Male who presents with a chief complaint of status asthmaticus (26 Oct 2018 20:34)    PAST MEDICAL & SURGICAL HISTORY:  PVD (peripheral vascular disease)  Hypertension  COPD (chronic obstructive pulmonary disease)  Osteomyelitis  Dyslipidemia  CAD (Coronary Artery Disease)  Diabetes Mellitus, Type II  CABG (Coronary Artery Bypass Graft)    YUE COMMODORE   78y    Male    BRIEF HOSPITAL COURSE:  s/p CPA due to asthma exacerbation     Review of Systems:   UATO                    All other ROS are negative.    Allergies    No Known Allergies    Intolerances    shellfish (Nausea)    Weight (kg): 98 (10-26-18 @ 11:31)  BMI (kg/m2): 29.3 (10-26-18 @ 11:31)    ICU Vital Signs Last 24 Hrs  T(C): 35.6 (27 Oct 2018 04:09), Max: 36.8 (26 Oct 2018 10:32)  T(F): 96.1 (27 Oct 2018 04:09), Max: 98.2 (26 Oct 2018 10:32)  HR: 86 (27 Oct 2018 04:00) (0 - 144)  BP: 120/69 (27 Oct 2018 04:00) (104/64 - 223/121)  BP(mean): 89 (27 Oct 2018 04:00) (78 - 163)  ABP: 138/60 (27 Oct 2018 04:00) (80/77 - 170/66)  ABP(mean): 86 (27 Oct 2018 04:00) (47 - 126)  RR: 14 (27 Oct 2018 04:00) (13 - 111)  SpO2: 100% (27 Oct 2018 04:00) (72% - 100%)    Physical Examination:    General:  sedate on vent     HEENT: RIJ  TLC     PULM:  bilateral BS few end exp wheezes.      CVS:  s1 s2 reg    ABD: soft NT     EXT: no edema     SKIN: warm    Neuro:  moves 4 to pain     ABG - ( 26 Oct 2018 20:52 )  pH, Arterial: 7.28  pH, Blood: x     /  pCO2: 65    /  pO2: 55    / HCO3: 26    / Base Excess: 3.5   /  SaO2: 89                Mode: AC/ CMV (Assist Control/ Continuous Mandatory Ventilation)  RR (machine): 12  TV (machine): 500  FiO2: 40  PEEP: 5  ITime: 1  MAP: 9.6  PIP: 31    Mode: AC/ CMV (Assist Control/ Continuous Mandatory Ventilation), RR (machine): 12, TV (machine): 500, FiO2: 40, PEEP: 5, ITime: 1, MAP: 9.6, PIP: 31  LABS:                        12.6   15.31 )-----------( 319      ( 26 Oct 2018 12:58 )             41.1     10    139  |  100  |  26<H>  ----------------------------<  199<H>  4.7   |  32<H>  |  1.70<H>    Ca    9.6      26 Oct 2018 21:39  Phos  2.9     10-26  Mg     2.2     10-26    TPro  6.9  /  Alb  3.3  /  TBili  0.3  /  DBili  x   /  AST  84<H>  /  ALT  100<H>  /  AlkPhos  97  10-26      CARDIAC MARKERS ( 26 Oct 2018 21:39 )  .155 ng/mL / x     / 454 U/L / x     / 19.2 ng/mL      CAPILLARY BLOOD GLUCOSE      POCT Blood Glucose.: 112 mg/dL (27 Oct 2018 04:05)  POCT Blood Glucose.: 120 mg/dL (27 Oct 2018 03:05)  POCT Blood Glucose.: 131 mg/dL (27 Oct 2018 02:02)  POCT Blood Glucose.: 148 mg/dL (27 Oct 2018 01:13)  POCT Blood Glucose.: 158 mg/dL (27 Oct 2018 00:38)  POCT Blood Glucose.: 162 mg/dL (26 Oct 2018 23:10)  POCT Blood Glucose.: 176 mg/dL (26 Oct 2018 22:06)  POCT Blood Glucose.: 214 mg/dL (26 Oct 2018 21:06)  POCT Blood Glucose.: 230 mg/dL (26 Oct 2018 20:14)  POCT Blood Glucose.: 311 mg/dL (26 Oct 2018 19:04)  POCT Blood Glucose.: 340 mg/dL (26 Oct 2018 18:20)  POCT Blood Glucose.: 393 mg/dL (26 Oct 2018 17:06)  POCT Blood Glucose.: 442 mg/dL (26 Oct 2018 16:16)  POCT Blood Glucose.: 342 mg/dL (26 Oct 2018 08:55)    PT/INR - ( 26 Oct 2018 08:48 )   PT: 10.4 sec;   INR: 0.96 ratio         PTT - ( 26 Oct 2018 08:48 )  PTT:34.0 sec  Urinalysis Basic - ( 26 Oct 2018 14:28 )    Color: Yellow / Appearance: Turbid / S.025 / pH: x  Gluc: x / Ketone: Negative  / Bili: Negative / Urobili: Negative   Blood: x / Protein: 150 mg/dL / Nitrite: Negative   Leuk Esterase: Negative / RBC: 0-2 /HPF / WBC 0-2   Sq Epi: x / Non Sq Epi: Few / Bacteria: Moderate      CULTURES:  Rapid RVP Result: Eric (10-26 @ 10:58)      Medications:  MEDICATIONS  (STANDING):  ALBUTerol    90 MICROgram(s) HFA Inhaler 4 Puff(s) Inhalation every 6 hours  azithromycin  IVPB      azithromycin  IVPB 500 milliGRAM(s) IV Intermittent every 24 hours  cefTRIAXone   IVPB 1 Gram(s) IV Intermittent every 24 hours  chlorhexidine 0.12% Liquid 15 milliLiter(s) Swish and Spit two times a day  heparin  Injectable 5000 Unit(s) SubCutaneous every 8 hours  insulin Infusion 0.5 Unit(s)/Hr (0.5 mL/Hr) IV Continuous <Continuous>  ipratropium 17 MICROgram(s) HFA Inhaler 1 Puff(s) Inhalation every 6 hours  ketamine Infusion. 0.5 mG/kG/Hr (24.95 mL/Hr) IV Continuous <Continuous>  methylPREDNISolone sodium succinate Injectable 125 milliGRAM(s) IV Push every 8 hours  pantoprazole  Injectable 40 milliGRAM(s) IV Push daily  propofol Infusion 10 MICROgram(s)/kG/Min (5.988 mL/Hr) IV Continuous <Continuous>  sodium chloride 0.9%. 1000 milliLiter(s) (75 mL/Hr) IV Continuous <Continuous>    MEDICATIONS  (PRN):  ALBUTerol    90 MICROgram(s) HFA Inhaler 4 Puff(s) Inhalation every 2 hours PRN Wheezing        10-26 @ 07:01  -  10-27 @ 04:23  --------------------------------------------------------  IN: 1393.1 mL / OUT: 200 mL / NET: 1193.1 mL        RADIOLOGY/IMAGING/ECHO    < from: Xray Chest 1 View-PORTABLE IMMEDIATE (10.26.18 @ 16:08) >    AP chest. Prior earlier same day.  Impression:  Left costophrenic excluded. Endotracheal tube nasogastric tube in   position. There has been interval introduction of a right internal   jugular line tip in the superior vena cava. No pneumothorax. No gross   change heart and lungs.          Assessment/Plan:      78M  with Hx asthma, COPD, CAD admit 10/26 with SOB for 2 days with status asthmaticus and profound hypercapnic respiratory failure leading to  PEA  cardiac arrest,  anoxic encephalopathy, MERRILL with hyperkalemia  severe hyperglycemia,  and lactic acidosis.     Sedate on vent with propofol ketamine.    Neuro  Hypothermia protocol, will now hold ketamine and propofol to eval MS.  Seems to have purposeful movements   Cor  HD stable SR   Pulm PAP  improved.  slight wheeze.  Steroids BD.     Gi  PPI feed soon if not extubated.    Renal  Non oliguric MERRILL with hyperkalemia  improving with IVF   Lactic acidosis type B related to MERRILL and metformin from home.  He is otherwise warm and perfused.    Heme/DVT added heparin.    ID on ceftriaxone an azithro.  No discrete infiltrate on CXR.  Leave on for now   Endo  hyperglycemia without DKA requiring insulin infusion .   Stress and steroid related.    Lines/tubes RIJ TLC  R radial a-lice 10/26  sites clean           CRITICAL CARE TIME SPENT: 33 minutes assessing presenting problems of acute illness, which pose high probability of life threatening deterioration or end organ damage/dysfunction, as well as medical decision making including initiating plan of care, reviewing data, reviewing radiologic exams, discussing with multidisciplinary team,  discussing goals of care with patient/family, and writing this note.  Non-inclusive of procedures performed,

## 2018-10-27 NOTE — DIETITIAN INITIAL EVALUATION ADULT. - ENERGY NEEDS
MSJ based on ht of 5'11"-6'0" wt of 215# , 78 yrs of age and male   1729 x 1.2 af x 1.1 sf = ~ 2280 kcals

## 2018-10-27 NOTE — PROGRESS NOTE ADULT - ASSESSMENT
Assessment  77 yo M with Hx asthma, COPD, chronic hypoxemic respiratory failure, CAD with CABG presents with worsening respiratory distress over a few days.  Found to have severe hypercapnic respiratory failure, initially improved on BiPAP.  Transferred to ICU and had bradycardic/PEA arrest.         Neuro  Sedated on propofol & ketamine drip  Pending CT Head after completion of targeted temperature management  Continue hypothermia protocol    Cardiovascular  Off pressors   TTE performed, pending results  restart aspirin & plavix    Pulmonary  Treatment regimen for status asthmaticus--decrease methylprednisone to 40mg IV BID, change albuterol frequency to every 4 hours standing with PRN albuterol available every 2 hours, increase frequency of ipatropium to every 4 hours  Continue ketamine drip for Assessment  77 yo M with Hx asthma, COPD, chronic hypoxemic respiratory failure, CAD with CABG presents with worsening respiratory distress over a few days.  Found to have severe hypercapnic respiratory failure, initially improved on BiPAP.  Transferred to ICU and had bradycardic/PEA arrest.     Neuro  Sedated on propofol & ketamine drip  Pending CT Head after completion of targeted temperature management  Continue hypothermia protocol    Cardiovascular  Off pressors   TTE performed, pending results  restart aspirin & plavix    Pulmonary  Treatment regimen for status asthmaticus--decrease methylprednisone to 40mg IV BID, change albuterol frequency to every 4 hours standing with PRN albuterol available every 2 hours, increase frequency of ipatropium to every 4 hours  Continue ketamine drip for sedation and bronchodilator properties (fixed dose)    Endocrine  Blood glucose levels currently well controlled on insulin drip, will consider changing to basal/bolus tomorrow after 24h of monitoring blood glucose levels with feedings and decreased steroids    /Renal  Improving MERRILL, continue IVF for hydration, restart flomax, continue cunha catheter for strict intake & output monitoring    GI  start feeds via OG tube, GI prophylaxis utilized    ID  COntinue Ceftriaxone & azithromycin for 3 days as empirical coverage for PNA, follow up blood & sputum cultures    Tubes/Lines  Right IJ central line, right radial A line, cunha catheter Assessment  77 yo M with Hx asthma, COPD, chronic hypoxemic respiratory failure, CAD with CABG presents with worsening respiratory distress over a few days.  Found to have severe hypercapnic respiratory failure, initially improved on BiPAP. Hospital Course complicated by bradycardic PEA arrest resulting anoxic encephalopathy and MERRILL.    Neuro  Sedated on propofol & ketamine drip (for sedation and vent synchrony in setting of status asthmaticus)  Pending CT Head after completion of targeted temperature management  Continue hypothermia protocol    Cardiovascular  Hemodynamically stable  TTE performed, pending results  restart aspirin & plavix (for CAD & PVD)    Pulmonary  Treatment regimen for status asthmaticus--decrease methylprednisone to 40mg IV BID, change albuterol frequency to every 4 hours standing with PRN albuterol available every 2 hours, increase frequency of ipatropium to every 4 hours  Continue ketamine drip for sedation and bronchodilator properties (fixed dose)    Endocrine  Blood glucose levels currently well controlled on insulin drip, will consider changing to basal/bolus tomorrow after 24h of monitoring blood glucose levels with feedings and decreased steroids    /Renal  Improving MERRILL, continue IVF for hydration, restart flomax, continue cunha catheter for strict intake & output monitoring    GI  start feeds via OG tube, GI prophylaxis utilized    ID  COntinue Ceftriaxone & azithromycin for 3 days as empirical coverage for PNA, follow up blood & sputum cultures    Tubes/Lines  Right IJ central line, right radial A line, cunha catheter

## 2018-10-27 NOTE — DIETITIAN INITIAL EVALUATION ADULT. - OTHER INFO
77 yo male with pmhx as below  presents with worsening respiratory distress over a few days. Pt found to have severe hypercapnic respiratory failure, initially improved on BiPAP.   Pt then transferred to ICU and had bradycardic/PEA arrest. It is difficult to see pt well as lights are out in room, lots of equipment , pt on hypothermia protocop and MD's doing rounds and in with pt at this time therfore unable to do a nutrition focused physical exam. From what I can tell, pt is not overly undernourished appearing nor obese. He is currently on a mech vent, orally intubated and sedated. FS markedly improved on insulin drip. RD aware that plan is for tubefeeding if extubation not feasible . RD nutrition assessment from june 2018 reviewed. Pt had a A1c of 6.2 which meets treatment goals for persons with DM, recent wts of 210-218#. skin at present - intact. Nutrient needs can be met with Pulmocare at 70 ml/hr x 22 hours ( for 96 gm pro and 2310 kcals) would provide 30 ml NC Prosource to help ensure adequate pro delivery, each 30 ml provides 15 gm protein . would anticpiate depirivan volume to be decreased or this medicine d/c'd once tf is at goal rate. If diprivan still providing a significant amount of fat kcals, volume of tf can be reduced.

## 2018-10-27 NOTE — CONSULT NOTE ADULT - SUBJECTIVE AND OBJECTIVE BOX
[facility] [place] [facility] - Saud Aguilar, Dani, Génesis, Medina, Kumar Hodges  Office Number: [phone]    Initial Consult Note    CHIEF COMPLAINT: Patient is a 78y old  Male who presents with a chief complaint of status asthmaticus (26 Oct 2018 20:34)      HPI:  79 yo M with Hx asthma, COPD, chronic hypoxemic respiratory failure, CAD with CABG presents with worsening respiratory distress over a few days.  Found to have severe hypercapnic respiratory failure, initially improved on BiPAP.  Transferred to ICU and had bradycardic/PEA arrest.     Cardiac wise, he has a history of CABG x 3V in 2004, and PVD with peripheral intervention more recently, and DM2.  He has been on Plavix  Wife reports a normal stress test in 8/2018.  Echo with normal LV function in 6/2018.    PAST MEDICAL & SURGICAL HISTORY:  PVD (peripheral vascular disease)  Hypertension  COPD (chronic obstructive pulmonary disease)  Osteomyelitis  Dyslipidemia  CAD (Coronary Artery Disease)  Diabetes Mellitus, Type II  CABG (Coronary Artery Bypass Graft)      SOCIAL HISTORY:  No tobacco, ethanol, or drug abuse.    FAMILY HISTORY:  Family history of diabetes mellitus (Sibling)    No family history of acute MI or sudden cardiac death.    MEDICATIONS  (STANDING):  ALBUTerol    90 MICROgram(s) HFA Inhaler 4 Puff(s) Inhalation every 6 hours  azithromycin  IVPB      azithromycin  IVPB 500 milliGRAM(s) IV Intermittent every 24 hours  cefTRIAXone   IVPB 1 Gram(s) IV Intermittent every 24 hours  chlorhexidine 0.12% Liquid 15 milliLiter(s) Swish and Spit two times a day  heparin  Injectable 5000 Unit(s) SubCutaneous every 8 hours  insulin Infusion 0.5 Unit(s)/Hr (0.5 mL/Hr) IV Continuous <Continuous>  ipratropium 17 MICROgram(s) HFA Inhaler 1 Puff(s) Inhalation every 6 hours  ketamine Infusion. 0.4 mG/kG/Hr (19.6 mL/Hr) IV Continuous <Continuous>  methylPREDNISolone sodium succinate Injectable 125 milliGRAM(s) IV Push every 8 hours  pantoprazole  Injectable 40 milliGRAM(s) IV Push daily  propofol Infusion 10 MICROgram(s)/kG/Min (5.988 mL/Hr) IV Continuous <Continuous>  sodium chloride 0.9%. 1000 milliLiter(s) (75 mL/Hr) IV Continuous <Continuous>    MEDICATIONS  (PRN):  ALBUTerol    90 MICROgram(s) HFA Inhaler 4 Puff(s) Inhalation every 2 hours PRN Wheezing      Allergies    No Known Allergies    Intolerances    shellfish (Nausea)      REVIEW OF SYSTEMS:  Unable to obtain ROS given the fact that he is intubated and sedated    VITAL SIGNS:   Vital Signs Last 24 Hrs  T(C): 35.6 (27 Oct 2018 04:09), Max: 36.8 (26 Oct 2018 10:32)  T(F): 96.1 (27 Oct 2018 04:09), Max: 98.2 (26 Oct 2018 10:32)  HR: 81 (27 Oct 2018 06:50) (0 - 144)  BP: 135/74 (27 Oct 2018 06:00) (104/64 - 223/121)  BP(mean): 97 (27 Oct 2018 06:00) (78 - 163)  RR: 18 (27 Oct 2018 06:50) (13 - 111)  SpO2: 100% (27 Oct 2018 06:50) (72% - 100%)    I&O's Summary    26 Oct 2018 07:01  -  27 Oct 2018 07:00  --------------------------------------------------------  IN: 1786.9 mL / OUT: 380 mL / NET: 1406.9 mL        On Exam:    Constitutional: NAD, intubated and sedated  Lungs:  decreased bilaterally, No wheezing, rales or rhonchi  Cardiovascular: borderline tachycardic, S1 and S2 positive.  No murmurs, rubs, gallops or clicks  Gastrointestinal: Bowel Sounds present, soft, nontender.   Lymph: No peripheral edema. No cervical lymphadenopathy.  Neurological: unable to assess  Skin: No rashes or ulcers   Psych:  unable to assess    LABS: All Labs Reviewed:                        11.8   18.97 )-----------( 264      ( 27 Oct 2018 06:38 )             37.8                         12.6   15.31 )-----------( 319      ( 26 Oct 2018 12:58 )             41.1                         13.2   16.17 )-----------( 369      ( 26 Oct 2018 08:48 )             43.4     26 Oct 2018 21:39    139    |  100    |  26     ----------------------------<  199    4.7     |  32     |  1.70   26 Oct 2018 12:58    134    |  95     |  21     ----------------------------<  480    5.5     |  33     |  1.90   26 Oct 2018 08:48    134    |  100    |  19     ----------------------------<  310    5.0     |  26     |  1.20     Ca    9.6        26 Oct 2018 21:39  Ca    9.6        26 Oct 2018 12:58  Ca    8.8        26 Oct 2018 08:48  Phos  2.9       26 Oct 2018 21:39  Phos  7.1       26 Oct 2018 12:58  Mg     2.2       26 Oct 2018 21:39  Mg     2.0       26 Oct 2018 12:58    TPro  6.9    /  Alb  3.3    /  TBili  0.3    /  DBili  x      /  AST  84     /  ALT  100    /  AlkPhos  97     26 Oct 2018 12:58  TPro  7.5    /  Alb  4.0    /  TBili  0.3    /  DBili  x      /  AST  32     /  ALT  31     /  AlkPhos  107    26 Oct 2018 08:48    PT/INR - ( 27 Oct 2018 06:38 )   PT: 11.0 sec;   INR: 1.01 ratio         PTT - ( 27 Oct 2018 06:38 )  PTT:31.9 sec  CARDIAC MARKERS ( 26 Oct 2018 21:39 )  .155 ng/mL / x     / 454 U/L / x     / 19.2 ng/mL      Blood Culture:   10-26 @ 08:48  Pro Bnp 204        RADIOLOGY:    EKG: ST

## 2018-10-27 NOTE — PROGRESS NOTE ADULT - SUBJECTIVE AND OBJECTIVE BOX
Brief Hospital Course:  78 year old male with past medical history of HTN, PVD, DM, Asthma, COPD on 2L home O2, hx CABGx3, HLD presented to ED w/ SOB and laboredwork of breathing.  Pt arrived by EMS on CPAP, switched to BiPAP in ED. As per wife, patient has had URI symptoms the past few days w/ non productive cough, nasal congestion and had increased usage of his rescue inhaler with no relief. She denies fever/chills, N/V, CP, abdominal pain, N/V, headache or dizziness.  On arrival to ICU patient was given 10mg labetalol IVP for hypertension.  With Rn at bedside patient became bradycardic, unresponsive and was found to be in PEA arrest, ACLS performed and ROSC achieved within approximately 17 minutes.  Patient intubated and on ventilator.    24 hour events:   remains on vent ABGs every 4 hours  Improving acidemia  continued on targeted temperature management      Review of Systems: Unable to perform patient intubated & sedated  Constitutional: Intubated & Sedated      T(F): 96.6 (10-27-18 @ 15:35), Max: 97.2 (10-26-18 @ 23:29)  HR: 90 (10-27-18 @ 16:00) (81 - 101)  BP: 123/72 (10-27-18 @ 16:00) (104/59 - 135/74)  RR: 15 (10-27-18 @ 16:00) (13 - 21)  SpO2: 100% (10-27-18 @ 16:00) (97% - 100%)  Wt(kg): --    Mode: AC/ CMV (Assist Control/ Continuous Mandatory Ventilation), RR (machine): 12, TV (machine): 500, FiO2: 30, PEEP: 5    CAPILLARY BLOOD GLUCOSE      POCT Blood Glucose.: 175 mg/dL (27 Oct 2018 16:35)      I&O's Summary    26 Oct 2018 07:  -  27 Oct 2018 07:00  --------------------------------------------------------  IN: 1786.9 mL / OUT: 380 mL / NET: 1406.9 mL    27 Oct 2018 07:01  -  27 Oct 2018 17:07  --------------------------------------------------------  IN: 1346 mL / OUT: 1205 mL / NET: 141 mL      Physical Exam:   Constitutional: Appears comfortable, intubated & sedated  HEENT: pupils 1mm equal and not reacting to light  Neck: supple, Central Line to RIJ-dressing, clean, dry & intact, ET tube, OG tube  Cardiovascular: Regular rate & rhythm, +S1/S2  Gastrointestinal: soft, non-tender, non distended, bowel sounds present x4  Extremities; No edema noted, no cyanosis or no clubbing,  Vascular: bilateral pedal pulse +2, capillary refill < 3 seconds  Neurological: intubated & sedated  Musculoskeletal: unable to assess d/t sedation   Skin: warm, dry, normal skin turgor      Code Status: Full Code    Meds:  azithromycin  IVPB   azithromycin  IVPB IV Intermittent  cefTRIAXone   IVPB IV Intermittent    tamsulosin Oral    insulin Infusion IV Continuous  methylPREDNISolone sodium succinate Injectable IV Push    ALBUTerol    90 MICROgram(s) HFA Inhaler Inhalation  ALBUTerol/ipratropium for Nebulization Nebulizer    ketamine Infusion. IV Continuous  propofol Infusion IV Continuous      heparin  Injectable SubCutaneous    pantoprazole  Injectable IV Push      sodium chloride 0.9%. IV Continuous      chlorhexidine 0.12% Liquid Swish and Spit                              11.8   18.97 )-----------( 264      ( 27 Oct 2018 06:38 )             37.8       10-27    141  |  103  |  22  ----------------------------<  193<H>  5.1   |  34<H>  |  1.20    Ca    9.7      27 Oct 2018 14:11  Phos  3.0     10-27  Mg     1.9     10-27    TPro  6.7  /  Alb  3.2<L>  /  TBili  0.3  /  DBili  x   /  AST  55<H>  /  ALT  92<H>  /  AlkPhos  81  10-27    Lactate 1.5           10-27 @ 14:10    Lactate 5.0           10-26 @ 21:39      CARDIAC MARKERS ( 26 Oct 2018 21:39 )  .155 ng/mL / x     / 454 U/L / x     / 19.2 ng/mL      PT/INR - ( 27 Oct 2018 06:38 )   PT: 11.0 sec;   INR: 1.01 ratio         PTT - ( 27 Oct 2018 06:38 )  PTT:31.9 sec  Urinalysis Basic - ( 26 Oct 2018 14:28 )    Color: Yellow / Appearance: Turbid / S.025 / pH: x  Gluc: x / Ketone: Negative  / Bili: Negative / Urobili: Negative   Blood: x / Protein: 150 mg/dL / Nitrite: Negative   Leuk Esterase: Negative / RBC: 0-2 /HPF / WBC 0-2   Sq Epi: x / Non Sq Epi: Few / Bacteria: Moderate      .Sputum Sputum - trap --   Few polymorphonuclear leukocytes per low power field  Moderate squamous epithelial cells per low power field  Numerous Gram positive cocci in pairs, chains and clusters 10-27 @ 10:23  .Urine Catheterized   No growth -- 10-26 @ 19:53      Rapid RVP Result: NotDetec (10-26 @ 10:58)    Bedside ultrasound: ***A line predominant    CENTRAL LINE:RIJ      DATE INSERTED:  10/26/18            REMOVE: Y/N  SHARMA: Yes                DATE INSERTED: 10/26/18             REMOVE: Y/N  A-LINE: Y (right radial)             DATE INSERTED: 10/26/18             REMOVE: Y/N    GLOBAL ISSUE/BEST PRACTICE:  Analgesia:  Sedation: Propofol, ketamine  HOB elevation: yes  Stress ulcer prophylaxis: Pantoprazole 40mg IVP daily  VTE prophylaxis:Heparin 5000 units SQ every 8 hours  Glycemic control: Blood glucose monitoring every 6 hours, insulin drip  Nutrition: OG tube feeding    CODE STATUS:Full Code Brief Hospital Course:  78 year old male with past medical history of HTN, PVD, DM, Asthma, COPD on 2L home O2, hx CABGx3, HLD presented to ED w/ SOB and laboredwork of breathing.  Pt arrived by EMS on CPAP, switched to BiPAP in ED. As per wife, patient has had URI symptoms the past few days w/ non productive cough, nasal congestion and had increased usage of his rescue inhaler with no relief. She denies fever/chills, N/V, CP, abdominal pain, N/V, headache or dizziness.  On arrival to ICU patient was given 10mg labetalol IVP for hypertension.  With Rn at bedside patient became bradycardic, unresponsive and was found to be in PEA arrest, ACLS performed and ROSC achieved within approximately 17 minutes.  Patient intubated and on ventilator.    24 hour events:   remains on vent Serial ABGs  Improving acidemia  continued on targeted temperature management      Review of Systems: Unable to perform patient intubated & sedated  Constitutional: Intubated & Sedated      T(F): 96.6 (10-27-18 @ 15:35), Max: 97.2 (10-26-18 @ 23:29)  HR: 90 (10-27-18 @ 16:00) (81 - 101)  BP: 123/72 (10-27-18 @ 16:00) (104/59 - 135/74)  RR: 15 (10-27-18 @ 16:00) (13 - 21)  SpO2: 100% (10-27-18 @ 16:00) (97% - 100%)  Wt(kg): --    Mode: AC/ CMV (Assist Control/ Continuous Mandatory Ventilation), RR (machine): 12, TV (machine): 500, FiO2: 30, PEEP: 5    CAPILLARY BLOOD GLUCOSE      POCT Blood Glucose.: 175 mg/dL (27 Oct 2018 16:35)      I&O's Summary    26 Oct 2018 07:01  -  27 Oct 2018 07:00  --------------------------------------------------------  IN: 1786.9 mL / OUT: 380 mL / NET: 1406.9 mL    27 Oct 2018 07:01  -  27 Oct 2018 17:07  --------------------------------------------------------  IN: 1346 mL / OUT: 1205 mL / NET: 141 mL      Physical Exam:   Constitutional: Appears comfortable, intubated & sedated  HEENT: pupils 1mm equal and not reacting to light  Neck: supple, Central Line to [facility]-dressing, clean, dry & intact, ET tube, OG tube  Cardiovascular: Regular rate & rhythm, +S1/S2  Gastrointestinal: soft, non-tender, non distended, bowel sounds present x4  Extremities; No edema noted, no cyanosis or no clubbing,  Vascular: bilateral pedal pulse +2, capillary refill < 3 seconds  Neurological: intubated & sedated  Musculoskeletal: unable to assess d/t sedation   Skin: warm, dry, normal skin turgor    Code Status: Full Code    Meds:  azithromycin  IVPB   azithromycin  IVPB IV Intermittent  cefTRIAXone   IVPB IV Intermittent    tamsulosin Oral    insulin Infusion IV Continuous  methylPREDNISolone sodium succinate Injectable IV Push    ALBUTerol    90 MICROgram(s) HFA Inhaler Inhalation  ALBUTerol/ipratropium for Nebulization Nebulizer    ketamine Infusion. IV Continuous  propofol Infusion IV Continuous      heparin  Injectable SubCutaneous    pantoprazole  Injectable IV Push      sodium chloride 0.9%. IV Continuous      chlorhexidine 0.12% Liquid Swish and Spit                          11.8   18.97 )-----------( 264      ( 27 Oct 2018 06:38 )             37.8       10-27    141  |  103  |  22  ----------------------------<  193<H>  5.1   |  34<H>  |  1.20    Ca    9.7      27 Oct 2018 14:11  Phos  3.0     10-27  Mg     1.9     10-27    TPro  6.7  /  Alb  3.2<L>  /  TBili  0.3  /  DBili  x   /  AST  55<H>  /  ALT  92<H>  /  AlkPhos  81  10-27    Lactate 1.5           10-27 @ 14:10    Lactate 5.0           10-26 @ 21:39      CARDIAC MARKERS ( 26 Oct 2018 21:39 )  .155 ng/mL / x     / 454 U/L / x     / 19.2 ng/mL      PT/INR - ( 27 Oct 2018 06:38 )   PT: 11.0 sec;   INR: 1.01 ratio         PTT - ( 27 Oct 2018 06:38 )  PTT:31.9 sec  Urinalysis Basic - ( 26 Oct 2018 14:28 )    Color: Yellow / Appearance: Turbid / S.025 / pH: x  Gluc: x / Ketone: Negative  / Bili: Negative / Urobili: Negative   Blood: x / Protein: 150 mg/dL / Nitrite: Negative   Leuk Esterase: Negative / RBC: 0-2 /HPF / WBC 0-2   Sq Epi: x / Non Sq Epi: Few / Bacteria: Moderate    .Sputum Sputum - trap --   Few polymorphonuclear leukocytes per low power field  Moderate squamous epithelial cells per low power field  Numerous Gram positive cocci in pairs, chains and clusters 10-27 @ 10:23  .Urine Catheterized   No growth -- 10-26 @ 19:53      Rapid RVP Result: NotDetec (10-26 @ 10:58)    Bedside ultrasound: ***A line predominant    CENTRAL LINE:RIJ      DATE INSERTED:  10/26/18            REMOVE: Y/N  SHARMA: Yes                DATE INSERTED: 10/26/18             REMOVE: Y/N  A-LINE: Y (right radial)             DATE INSERTED: 10/26/18             REMOVE: Y/N    GLOBAL ISSUE/BEST PRACTICE:  Analgesia:  Sedation: Propofol, ketamine  HOB elevation: yes  Stress ulcer prophylaxis: Pantoprazole 40mg IVP daily  VTE prophylaxis:Heparin 5000 units SQ every 8 hours  Glycemic control: Blood glucose monitoring every 6 hours, insulin drip  Nutrition: OG tube feeding    CODE STATUS:Full Code Brief Hospital Course:  78 year old male with past medical history of HTN, PVD, DM, Asthma, COPD on 2L home O2, hx CABGx3, HLD presented to ED w/ SOB and laboredwork of breathing.  Pt arrived by EMS on CPAP, switched to BiPAP in ED. As per wife, patient has had URI symptoms the past few days w/ non productive cough, nasal congestion and had increased usage of his rescue inhaler with no relief. She denies fever/chills, N/V, CP, abdominal pain, N/V, headache or dizziness.  On arrival to ICU patient was given 10mg labetalol IVP for hypertension.  With Rn at bedside patient became bradycardic, unresponsive and was found to be in PEA arrest, ACLS performed and ROSC achieved within approximately 17 minutes.  Patient intubated and on ventilator.    24 hour events:   remains on vent Serial ABGs  Improving acidemia  continued on targeted temperature management      Review of Systems: Unable to perform patient intubated & sedated  Constitutional: Intubated & Sedated      T(F): 96.6 (10-27-18 @ 15:35), Max: 97.2 (10-26-18 @ 23:29)  HR: 90 (10-27-18 @ 16:00) (81 - 101)  BP: 123/72 (10-27-18 @ 16:00) (104/59 - 135/74)  RR: 15 (10-27-18 @ 16:00) (13 - 21)  SpO2: 100% (10-27-18 @ 16:00) (97% - 100%)  Wt(kg): --    Mode: AC/ CMV (Assist Control/ Continuous Mandatory Ventilation), RR (machine): 12, TV (machine): 500, FiO2: 30, PEEP: 5    CAPILLARY BLOOD GLUCOSE      POCT Blood Glucose.: 175 mg/dL (27 Oct 2018 16:35)      I&O's Summary    26 Oct 2018 07:01  -  27 Oct 2018 07:00  --------------------------------------------------------  IN: 1786.9 mL / OUT: 380 mL / NET: 1406.9 mL    27 Oct 2018 07:01  -  27 Oct 2018 17:07  --------------------------------------------------------  IN: 1346 mL / OUT: 1205 mL / NET: 141 mL      Physical Exam:   Constitutional: Appears comfortable, intubated & sedated  HEENT: pupils 1mm equal and not reacting to light  Neck: supple, Central Line to [facility]-dressing, clean, dry & intact, ET tube, OG tube  Cardiovascular: Regular rate & rhythm, +S1/S2  Gastrointestinal: soft, non-tender, non distended, bowel sounds present x4  Respiratory: Inspiratory & expiratory wheezes, no rhonchi or rales  Extremities; No edema noted, no cyanosis or no clubbing,  Vascular: bilateral pedal pulse +2, capillary refill < 3 seconds  Neurological: intubated & sedated  Musculoskeletal: unable to assess d/t sedation   Skin: warm, dry, normal skin turgor    Code Status: Full Code    Meds:  azithromycin  IVPB   azithromycin  IVPB IV Intermittent  cefTRIAXone   IVPB IV Intermittent    tamsulosin Oral    insulin Infusion IV Continuous  methylPREDNISolone sodium succinate Injectable IV Push    ALBUTerol    90 MICROgram(s) HFA Inhaler Inhalation  ALBUTerol/ipratropium for Nebulization Nebulizer    ketamine Infusion. IV Continuous  propofol Infusion IV Continuous      heparin  Injectable SubCutaneous    pantoprazole  Injectable IV Push      sodium chloride 0.9%. IV Continuous      chlorhexidine 0.12% Liquid Swish and Spit                          11.8   18.97 )-----------( 264      ( 27 Oct 2018 06:38 )             37.8       10-27    141  |  103  |  22  ----------------------------<  193<H>  5.1   |  34<H>  |  1.20    Ca    9.7      27 Oct 2018 14:11  Phos  3.0     10-27  Mg     1.9     10-27    TPro  6.7  /  Alb  3.2<L>  /  TBili  0.3  /  DBili  x   /  AST  55<H>  /  ALT  92<H>  /  AlkPhos  81  10-27    Lactate 1.5           10-27 @ 14:10    Lactate 5.0           10-26 @ 21:39      CARDIAC MARKERS ( 26 Oct 2018 21:39 )  .155 ng/mL / x     / 454 U/L / x     / 19.2 ng/mL      PT/INR - ( 27 Oct 2018 06:38 )   PT: 11.0 sec;   INR: 1.01 ratio         PTT - ( 27 Oct 2018 06:38 )  PTT:31.9 sec  Urinalysis Basic - ( 26 Oct 2018 14:28 )    Color: Yellow / Appearance: Turbid / S.025 / pH: x  Gluc: x / Ketone: Negative  / Bili: Negative / Urobili: Negative   Blood: x / Protein: 150 mg/dL / Nitrite: Negative   Leuk Esterase: Negative / RBC: 0-2 /HPF / WBC 0-2   Sq Epi: x / Non Sq Epi: Few / Bacteria: Moderate    .Sputum Sputum - trap --   Few polymorphonuclear leukocytes per low power field  Moderate squamous epithelial cells per low power field  Numerous Gram positive cocci in pairs, chains and clusters 10-27 @ 10:23  .Urine Catheterized   No growth -- 10-26 @ 19:53      Rapid RVP Result: NotDetec (10-26 @ 10:58)    Bedside ultrasound: ***A line predominant    CENTRAL LINE:RIJ      DATE INSERTED:  10/26/18            REMOVE: Y/N  SHARMA: Yes                DATE INSERTED: 10/26/18             REMOVE: Y/N  A-LINE: Y (right radial)             DATE INSERTED: 10/26/18             REMOVE: Y/N    GLOBAL ISSUE/BEST PRACTICE:  Analgesia:  Sedation: Propofol, ketamine  HOB elevation: yes  Stress ulcer prophylaxis: Pantoprazole 40mg IVP daily  VTE prophylaxis:Heparin 5000 units SQ every 8 hours  Glycemic control: Blood glucose monitoring every 6 hours, insulin drip  Nutrition: OG tube feeding    CODE STATUS:Full Code

## 2018-10-27 NOTE — PROGRESS NOTE ADULT - ATTENDING COMMENTS
79 yo M with Hx asthma, COPD, chronic hypoxemic respiratory failure, CAD with status asthmaticus and profound hypercapnic respiratory failure, complicated by PEA cardiac arrest (likely due to hypercapnic acidosis), anoxic encephalopathy, MERRILL, severe hyperglycemia.    --anoxic encephalopathy s/p cardiac arrest  targeted temperature management goal 36C  CTH when 24h protocol completed  requires heavy sedation due to respiratory status, sedated with propofol and ketamine   --s/p cardiac arrest, likely due to profound acidosis and hypercapnia  f/u echo  if CTH negative continue ASA, plavix   --status asthmaticus with profound hypercapnic respiratory failure  marked improvement in respiratory acidosis  still with autoPEEP about 5  decrease steroids to solumedrol 40q8h, continue q2h bronchodilators  ketamine gtt for sedation and bronchodilator properties, will begin to wean  check sputum Cx, urine legionella Ag  empiric azithro and CTX for now  --start TF, cont GI ppx  --MERRILL s/p cardiac arrest, improved  cont IV hydration  --DM type 2 better controlled  given anticipated changes in feeding and weaning steroids will continue insulin gtt for now as on a stable rate  --discussed plan with wife in detail  --pt critically ill, total CC time 60min

## 2018-10-27 NOTE — PROGRESS NOTE ADULT - SUBJECTIVE AND OBJECTIVE BOX
24 hour events:   continued on targeted temperature management  NEDA and Leigha hernandez     Review of Systems: LINA    T(F): 97.2 (10-27-18 @ 08:00), Max: 98.2 (10-26-18 @ 10:32)  HR: 87 (10-27-18 @ 08:18) (0 - 144)  BP: 113/59 (10-27-18 @ 08:00) (104/59 - 223/121)  RR: 13 (10-27-18 @ 08:00) (13 - 111)  SpO2: 100% (10-27-18 @ 08:18) (72% - 100%)  Wt(kg): --    Mode: AC/ CMV (Assist Control/ Continuous Mandatory Ventilation), RR (machine): 12, TV (machine): 500, FiO2: 30, PEEP: 5  10-26-18 @ 07:01  -  10-27-18 @ 07:00  --------------------------------------------------------  IN: 1786.9 mL / OUT: 380 mL / NET: 1406.9 mL    10-27-18 @ 07:01  -  10-27-18 @ 08:55  --------------------------------------------------------  IN: 124.5 mL / OUT: 125 mL / NET: -0.5 mL        CAPILLARY BLOOD GLUCOSE      POCT Blood Glucose.: 165 mg/dL (27 Oct 2018 08:15)      I&O's Summary    26 Oct 2018 07:  -  27 Oct 2018 07:00  --------------------------------------------------------  IN: 1786.9 mL / OUT: 380 mL / NET: 1406.9 mL    27 Oct 2018 07:  -  27 Oct 2018 08:55  --------------------------------------------------------  IN: 124.5 mL / OUT: 125 mL / NET: -0.5 mL        Physical Exam:   Gen:  Neuro:  HEENT:  Resp:  CVS:  Abd:  Ext:  Skin:    Meds:  azithromycin  IVPB   azithromycin  IVPB IV Intermittent  cefTRIAXone   IVPB IV Intermittent      insulin Infusion IV Continuous  methylPREDNISolone sodium succinate Injectable IV Push    ALBUTerol    90 MICROgram(s) HFA Inhaler Inhalation PRN  ALBUTerol    90 MICROgram(s) HFA Inhaler Inhalation  ipratropium 17 MICROgram(s) HFA Inhaler Inhalation    ketamine Infusion. IV Continuous  propofol Infusion IV Continuous      heparin  Injectable SubCutaneous    pantoprazole  Injectable IV Push      sodium chloride 0.9%. IV Continuous      chlorhexidine 0.12% Liquid Swish and Spit                              11.8   18.97 )-----------( 264      ( 27 Oct 2018 06:38 )             37.8       10-27    140  |  103  |  21  ----------------------------<  125<H>  4.8   |  32<H>  |  1.20    Ca    9.4      27 Oct 2018 06:38  Phos  3.1     10-27  Mg     2.1     10-27    TPro  6.7  /  Alb  3.2<L>  /  TBili  0.3  /  DBili  x   /  AST  55<H>  /  ALT  92<H>  /  AlkPhos  81  10-27    Lactate 5.0           10-26 @ 21:39    Lactate 5.7           10-26 @ 12:58      CARDIAC MARKERS ( 26 Oct 2018 21:39 )  .155 ng/mL / x     / 454 U/L / x     / 19.2 ng/mL      PT/INR - ( 27 Oct 2018 06:38 )   PT: 11.0 sec;   INR: 1.01 ratio         PTT - ( 27 Oct 2018 06:38 )  PTT:31.9 sec  Urinalysis Basic - ( 26 Oct 2018 14:28 )    Color: Yellow / Appearance: Turbid / S.025 / pH: x  Gluc: x / Ketone: Negative  / Bili: Negative / Urobili: Negative   Blood: x / Protein: 150 mg/dL / Nitrite: Negative   Leuk Esterase: Negative / RBC: 0-2 /HPF / WBC 0-2   Sq Epi: x / Non Sq Epi: Few / Bacteria: Moderate          Rapid RVP Result: NotDetec (10-26 @ 10:58)          Radiology: ***  Bedside ultrasound: ***    CENTRAL LINE: N/Y          DATE INSERTED:              REMOVE: Y/N  SHARMA: N/Y                       DATE INSERTED:              REMOVE: Y/N  A-LINE: N/Y                       DATE INSERTED:              REMOVE: Y/N    GLOBAL ISSUE/BEST PRACTICE:  Analgesia:  Sedation:  CAM-ICU:   HOB elevation: yes  Stress ulcer prophylaxis:  VTE prophylaxis:  Glycemic control:  Nutrition:    CODE STATUS: *** 24 hour events:   continued on targeted temperature management  NEDA and Leigha hernandez     Review of Systems: LINA    T(F): 97.2 (10-27-18 @ 08:00), Max: 98.2 (10-26-18 @ 10:32)  HR: 87 (10-27-18 @ 08:18) (0 - 144)  BP: 113/59 (10-27-18 @ 08:00) (104/59 - 223/121)  RR: 13 (10-27-18 @ 08:00) (13 - 111)  SpO2: 100% (10-27-18 @ 08:18) (72% - 100%)  Wt(kg): --    Mode: AC/ CMV (Assist Control/ Continuous Mandatory Ventilation), RR (machine): 12, TV (machine): 500, FiO2: 30, PEEP: 5  10-26-18 @ 07:01  -  10-27-18 @ 07:00  --------------------------------------------------------  IN: 1786.9 mL / OUT: 380 mL / NET: 1406.9 mL    10-27-18 @ 07:01  -  10-27-18 @ 08:55  --------------------------------------------------------  IN: 124.5 mL / OUT: 125 mL / NET: -0.5 mL        CAPILLARY BLOOD GLUCOSE      POCT Blood Glucose.: 165 mg/dL (27 Oct 2018 08:15)      I&O's Summary    26 Oct 2018 07:  -  27 Oct 2018 07:00  --------------------------------------------------------  IN: 1786.9 mL / OUT: 380 mL / NET: 1406.9 mL    27 Oct 2018 07:  -  27 Oct 2018 08:55  --------------------------------------------------------  IN: 124.5 mL / OUT: 125 mL / NET: -0.5 mL        Physical Exam:   Gen: critically ill, intubated and sedated  Neuro: PERRL, responds to pain in extremities  HEENT: PERRL  Resp: inspiratory and expiratory wheezes, prolonged expiration  CVS: RRR  Abd: soft, NTND  Ext: no edema  Skin: cool and dry    Meds:  azithromycin  IVPB   azithromycin  IVPB IV Intermittent  cefTRIAXone   IVPB IV Intermittent      insulin Infusion IV Continuous  methylPREDNISolone sodium succinate Injectable IV Push    ALBUTerol    90 MICROgram(s) HFA Inhaler Inhalation PRN  ALBUTerol    90 MICROgram(s) HFA Inhaler Inhalation  ipratropium 17 MICROgram(s) HFA Inhaler Inhalation    ketamine Infusion. IV Continuous  propofol Infusion IV Continuous      heparin  Injectable SubCutaneous    pantoprazole  Injectable IV Push      sodium chloride 0.9%. IV Continuous      chlorhexidine 0.12% Liquid Swish and Spit                              11.8   18.97 )-----------( 264      ( 27 Oct 2018 06:38 )             37.8       10-27    140  |  103  |  21  ----------------------------<  125<H>  4.8   |  32<H>  |  1.20    Ca    9.4      27 Oct 2018 06:38  Phos  3.1     10-27  Mg     2.1     10-27    TPro  6.7  /  Alb  3.2<L>  /  TBili  0.3  /  DBili  x   /  AST  55<H>  /  ALT  92<H>  /  AlkPhos  81  10-27    Lactate 5.0           10-26 @ 21:39    Lactate 5.7           10-26 @ 12:58      CARDIAC MARKERS ( 26 Oct 2018 21:39 )  .155 ng/mL / x     / 454 U/L / x     / 19.2 ng/mL      PT/INR - ( 27 Oct 2018 06:38 )   PT: 11.0 sec;   INR: 1.01 ratio         PTT - ( 27 Oct 2018 06:38 )  PTT:31.9 sec  Urinalysis Basic - ( 26 Oct 2018 14:28 )    Color: Yellow / Appearance: Turbid / S.025 / pH: x  Gluc: x / Ketone: Negative  / Bili: Negative / Urobili: Negative   Blood: x / Protein: 150 mg/dL / Nitrite: Negative   Leuk Esterase: Negative / RBC: 0-2 /HPF / WBC 0-2   Sq Epi: x / Non Sq Epi: Few / Bacteria: Moderate          Rapid RVP Result: NotDetec (10-26 @ 10:58)          Radiology:   < from: Xray Chest 1 View-PORTABLE IMMEDIATE (10.26.18 @ 16:08) >  INTERPRETATION:  Status post right internal jugular line.    AP chest. Prior earlier same day.  Impression:  Left costophrenic excluded. Endotracheal tube nasogastric tube in   position. There has been interval introduction of a right internal   jugular line tip in the superior vena cava. No pneumothorax. No gross   change heart and lungs.    < end of copied text >      < from: Xray Chest 1 View-PORTABLE IMMEDIATE (10.26.18 @ 13:09) >    INTERPRETATION:  Clinical information: Shortness of breath, intubated    Portable study, 12:44 PM    Comparison exam dated 10/26/2018, 8:51 AM.    Cardiac monitor leads overlie the chest. An endotracheal tube is present   with its tip 10 cm above the angelica.    Sternal sutures present. No lobar consolidation effusion or pneumothorax.   No vascular congestion. Left costophrenic angle is not includedon this   study. Heart size within normal limits. No acute osseous abnormalities.    IMPRESSION:    See above report      < end of copied text >        CENTRAL LINE: RIJ TLC  SHARMA: Y  A-LINE: R rad     GLOBAL ISSUE/BEST PRACTICE:  Analgesia: N  Sedation:Y  CAM-ICU: LINA  HOB elevation: yes  Stress ulcer prophylaxis: Y  VTE prophylaxis: Y  Glycemic control: Y  Nutrition: Y    CODE STATUS: FULL

## 2018-10-28 LAB
ALBUMIN SERPL ELPH-MCNC: 3 G/DL — LOW (ref 3.3–5)
ALP SERPL-CCNC: 83 U/L — SIGNIFICANT CHANGE UP (ref 40–120)
ALT FLD-CCNC: 72 U/L — SIGNIFICANT CHANGE UP (ref 12–78)
ANION GAP SERPL CALC-SCNC: 6 MMOL/L — SIGNIFICANT CHANGE UP (ref 5–17)
APTT BLD: 30.1 SEC — SIGNIFICANT CHANGE UP (ref 27.5–37.4)
AST SERPL-CCNC: 24 U/L — SIGNIFICANT CHANGE UP (ref 15–37)
BASE EXCESS BLDA CALC-SCNC: 6.7 MMOL/L — HIGH (ref -2–2)
BASE EXCESS BLDA CALC-SCNC: 7.8 MMOL/L — HIGH (ref -2–2)
BASOPHILS # BLD AUTO: 0.01 K/UL — SIGNIFICANT CHANGE UP (ref 0–0.2)
BASOPHILS NFR BLD AUTO: 0.1 % — SIGNIFICANT CHANGE UP (ref 0–2)
BILIRUB SERPL-MCNC: 0.3 MG/DL — SIGNIFICANT CHANGE UP (ref 0.2–1.2)
BLOOD GAS COMMENTS ARTERIAL: SIGNIFICANT CHANGE UP
BUN SERPL-MCNC: 22 MG/DL — SIGNIFICANT CHANGE UP (ref 7–23)
CALCIUM SERPL-MCNC: 9.9 MG/DL — SIGNIFICANT CHANGE UP (ref 8.5–10.1)
CHLORIDE SERPL-SCNC: 103 MMOL/L — SIGNIFICANT CHANGE UP (ref 96–108)
CO2 SERPL-SCNC: 34 MMOL/L — HIGH (ref 22–31)
CREAT SERPL-MCNC: 0.97 MG/DL — SIGNIFICANT CHANGE UP (ref 0.5–1.3)
EOSINOPHIL # BLD AUTO: 0 K/UL — SIGNIFICANT CHANGE UP (ref 0–0.5)
EOSINOPHIL NFR BLD AUTO: 0 % — SIGNIFICANT CHANGE UP (ref 0–6)
GLUCOSE SERPL-MCNC: 182 MG/DL — HIGH (ref 70–99)
HCO3 BLDA-SCNC: 29 MMOL/L — HIGH (ref 23–27)
HCO3 BLDA-SCNC: 31 MMOL/L — HIGH (ref 23–27)
HCT VFR BLD CALC: 38.2 % — LOW (ref 39–50)
HGB BLD-MCNC: 11.9 G/DL — LOW (ref 13–17)
HOROWITZ INDEX BLDA+IHG-RTO: 30 — SIGNIFICANT CHANGE UP
HOROWITZ INDEX BLDA+IHG-RTO: 30 — SIGNIFICANT CHANGE UP
IMM GRANULOCYTES NFR BLD AUTO: 0.7 % — SIGNIFICANT CHANGE UP (ref 0–1.5)
INR BLD: 0.96 RATIO — SIGNIFICANT CHANGE UP (ref 0.88–1.16)
LACTATE SERPL-SCNC: 1.8 MMOL/L — SIGNIFICANT CHANGE UP (ref 0.7–2)
LYMPHOCYTES # BLD AUTO: 0.47 K/UL — LOW (ref 1–3.3)
LYMPHOCYTES # BLD AUTO: 2.7 % — LOW (ref 13–44)
MAGNESIUM SERPL-MCNC: 2.5 MG/DL — SIGNIFICANT CHANGE UP (ref 1.6–2.6)
MCHC RBC-ENTMCNC: 27.7 PG — SIGNIFICANT CHANGE UP (ref 27–34)
MCHC RBC-ENTMCNC: 31.2 GM/DL — LOW (ref 32–36)
MCV RBC AUTO: 89 FL — SIGNIFICANT CHANGE UP (ref 80–100)
MONOCYTES # BLD AUTO: 1 K/UL — HIGH (ref 0–0.9)
MONOCYTES NFR BLD AUTO: 5.8 % — SIGNIFICANT CHANGE UP (ref 2–14)
NEUTROPHILS # BLD AUTO: 15.74 K/UL — HIGH (ref 1.8–7.4)
NEUTROPHILS NFR BLD AUTO: 90.7 % — HIGH (ref 43–77)
PCO2 BLDA: 52 MMHG — HIGH (ref 32–46)
PCO2 BLDA: 56 MMHG — HIGH (ref 32–46)
PH BLDA: 7.37 — SIGNIFICANT CHANGE UP (ref 7.35–7.45)
PH BLDA: 7.42 — SIGNIFICANT CHANGE UP (ref 7.35–7.45)
PHOSPHATE SERPL-MCNC: 2.7 MG/DL — SIGNIFICANT CHANGE UP (ref 2.5–4.5)
PLATELET # BLD AUTO: 264 K/UL — SIGNIFICANT CHANGE UP (ref 150–400)
PO2 BLDA: 100 MMHG — SIGNIFICANT CHANGE UP (ref 74–108)
PO2 BLDA: 96 MMHG — SIGNIFICANT CHANGE UP (ref 74–108)
POTASSIUM SERPL-MCNC: 4.9 MMOL/L — SIGNIFICANT CHANGE UP (ref 3.5–5.3)
POTASSIUM SERPL-SCNC: 4.9 MMOL/L — SIGNIFICANT CHANGE UP (ref 3.5–5.3)
PROT SERPL-MCNC: 6.4 G/DL — SIGNIFICANT CHANGE UP (ref 6–8.3)
PROTHROM AB SERPL-ACNC: 10.4 SEC — SIGNIFICANT CHANGE UP (ref 9.8–12.7)
RBC # BLD: 4.29 M/UL — SIGNIFICANT CHANGE UP (ref 4.2–5.8)
RBC # FLD: 13.5 % — SIGNIFICANT CHANGE UP (ref 10.3–14.5)
SAO2 % BLDA: 98 % — HIGH (ref 92–96)
SAO2 % BLDA: 98 % — HIGH (ref 92–96)
SODIUM SERPL-SCNC: 143 MMOL/L — SIGNIFICANT CHANGE UP (ref 135–145)
WBC # BLD: 17.35 K/UL — HIGH (ref 3.8–10.5)
WBC # FLD AUTO: 17.35 K/UL — HIGH (ref 3.8–10.5)

## 2018-10-28 PROCEDURE — 99232 SBSQ HOSP IP/OBS MODERATE 35: CPT

## 2018-10-28 PROCEDURE — 99291 CRITICAL CARE FIRST HOUR: CPT

## 2018-10-28 PROCEDURE — 70450 CT HEAD/BRAIN W/O DYE: CPT | Mod: 26

## 2018-10-28 RX ORDER — DEXMEDETOMIDINE HYDROCHLORIDE IN 0.9% SODIUM CHLORIDE 4 UG/ML
0.3 INJECTION INTRAVENOUS
Qty: 200 | Refills: 0 | Status: DISCONTINUED | OUTPATIENT
Start: 2018-10-28 | End: 2018-10-29

## 2018-10-28 RX ORDER — FUROSEMIDE 40 MG
20 TABLET ORAL ONCE
Qty: 0 | Refills: 0 | Status: COMPLETED | OUTPATIENT
Start: 2018-10-28 | End: 2018-10-28

## 2018-10-28 RX ORDER — ALBUTEROL 90 UG/1
4 AEROSOL, METERED ORAL
Qty: 0 | Refills: 0 | Status: DISCONTINUED | OUTPATIENT
Start: 2018-10-28 | End: 2018-10-29

## 2018-10-28 RX ORDER — HYDRALAZINE HCL 50 MG
25 TABLET ORAL EVERY 8 HOURS
Qty: 0 | Refills: 0 | Status: DISCONTINUED | OUTPATIENT
Start: 2018-10-28 | End: 2018-10-30

## 2018-10-28 RX ORDER — CLOPIDOGREL BISULFATE 75 MG/1
75 TABLET, FILM COATED ORAL DAILY
Qty: 0 | Refills: 0 | Status: DISCONTINUED | OUTPATIENT
Start: 2018-10-28 | End: 2018-11-02

## 2018-10-28 RX ORDER — INSULIN GLARGINE 100 [IU]/ML
6 INJECTION, SOLUTION SUBCUTANEOUS AT BEDTIME
Qty: 0 | Refills: 0 | Status: DISCONTINUED | OUTPATIENT
Start: 2018-10-28 | End: 2018-10-28

## 2018-10-28 RX ORDER — INSULIN LISPRO 100/ML
VIAL (ML) SUBCUTANEOUS EVERY 6 HOURS
Qty: 0 | Refills: 0 | Status: DISCONTINUED | OUTPATIENT
Start: 2018-10-28 | End: 2018-10-31

## 2018-10-28 RX ORDER — ASPIRIN/CALCIUM CARB/MAGNESIUM 324 MG
81 TABLET ORAL DAILY
Qty: 0 | Refills: 0 | Status: DISCONTINUED | OUTPATIENT
Start: 2018-10-28 | End: 2018-11-02

## 2018-10-28 RX ORDER — MIDAZOLAM HYDROCHLORIDE 1 MG/ML
2 INJECTION, SOLUTION INTRAMUSCULAR; INTRAVENOUS ONCE
Qty: 0 | Refills: 0 | Status: DISCONTINUED | OUTPATIENT
Start: 2018-10-28 | End: 2018-10-28

## 2018-10-28 RX ORDER — ATORVASTATIN CALCIUM 80 MG/1
40 TABLET, FILM COATED ORAL AT BEDTIME
Qty: 0 | Refills: 0 | Status: DISCONTINUED | OUTPATIENT
Start: 2018-10-28 | End: 2018-11-02

## 2018-10-28 RX ORDER — INSULIN GLARGINE 100 [IU]/ML
6 INJECTION, SOLUTION SUBCUTANEOUS AT BEDTIME
Qty: 0 | Refills: 0 | Status: DISCONTINUED | OUTPATIENT
Start: 2018-10-28 | End: 2018-11-02

## 2018-10-28 RX ADMIN — Medication 3 MILLILITER(S): at 17:11

## 2018-10-28 RX ADMIN — SODIUM CHLORIDE 75 MILLILITER(S): 9 INJECTION INTRAMUSCULAR; INTRAVENOUS; SUBCUTANEOUS at 05:10

## 2018-10-28 RX ADMIN — Medication 3 MILLILITER(S): at 03:01

## 2018-10-28 RX ADMIN — HEPARIN SODIUM 5000 UNIT(S): 5000 INJECTION INTRAVENOUS; SUBCUTANEOUS at 21:33

## 2018-10-28 RX ADMIN — TAMSULOSIN HYDROCHLORIDE 0.4 MILLIGRAM(S): 0.4 CAPSULE ORAL at 21:33

## 2018-10-28 RX ADMIN — CEFTRIAXONE 100 GRAM(S): 500 INJECTION, POWDER, FOR SOLUTION INTRAMUSCULAR; INTRAVENOUS at 18:26

## 2018-10-28 RX ADMIN — Medication 3 MILLILITER(S): at 07:48

## 2018-10-28 RX ADMIN — DEXMEDETOMIDINE HYDROCHLORIDE IN 0.9% SODIUM CHLORIDE 7.35 MICROGRAM(S)/KG/HR: 4 INJECTION INTRAVENOUS at 20:40

## 2018-10-28 RX ADMIN — PROPOFOL 5.99 MICROGRAM(S)/KG/MIN: 10 INJECTION, EMULSION INTRAVENOUS at 20:39

## 2018-10-28 RX ADMIN — ALBUTEROL 4 PUFF(S): 90 AEROSOL, METERED ORAL at 15:30

## 2018-10-28 RX ADMIN — HEPARIN SODIUM 5000 UNIT(S): 5000 INJECTION INTRAVENOUS; SUBCUTANEOUS at 05:10

## 2018-10-28 RX ADMIN — MIDAZOLAM HYDROCHLORIDE 2 MILLIGRAM(S): 1 INJECTION, SOLUTION INTRAMUSCULAR; INTRAVENOUS at 09:03

## 2018-10-28 RX ADMIN — PANTOPRAZOLE SODIUM 40 MILLIGRAM(S): 20 TABLET, DELAYED RELEASE ORAL at 12:11

## 2018-10-28 RX ADMIN — Medication 40 MILLIGRAM(S): at 05:09

## 2018-10-28 RX ADMIN — CHLORHEXIDINE GLUCONATE 15 MILLILITER(S): 213 SOLUTION TOPICAL at 18:27

## 2018-10-28 RX ADMIN — Medication 3 MILLILITER(S): at 10:53

## 2018-10-28 RX ADMIN — ALBUTEROL 4 PUFF(S): 90 AEROSOL, METERED ORAL at 10:52

## 2018-10-28 RX ADMIN — CHLORHEXIDINE GLUCONATE 15 MILLILITER(S): 213 SOLUTION TOPICAL at 05:09

## 2018-10-28 RX ADMIN — HEPARIN SODIUM 5000 UNIT(S): 5000 INJECTION INTRAVENOUS; SUBCUTANEOUS at 14:25

## 2018-10-28 RX ADMIN — AZITHROMYCIN 255 MILLIGRAM(S): 500 TABLET, FILM COATED ORAL at 12:12

## 2018-10-28 RX ADMIN — ALBUTEROL 4 PUFF(S): 90 AEROSOL, METERED ORAL at 14:44

## 2018-10-28 RX ADMIN — Medication 81 MILLIGRAM(S): at 12:14

## 2018-10-28 RX ADMIN — DEXMEDETOMIDINE HYDROCHLORIDE IN 0.9% SODIUM CHLORIDE 7.35 MICROGRAM(S)/KG/HR: 4 INJECTION INTRAVENOUS at 15:57

## 2018-10-28 RX ADMIN — INSULIN GLARGINE 6 UNIT(S): 100 INJECTION, SOLUTION SUBCUTANEOUS at 12:12

## 2018-10-28 RX ADMIN — ALBUTEROL 4 PUFF(S): 90 AEROSOL, METERED ORAL at 03:02

## 2018-10-28 RX ADMIN — PROPOFOL 5.99 MICROGRAM(S)/KG/MIN: 10 INJECTION, EMULSION INTRAVENOUS at 01:32

## 2018-10-28 RX ADMIN — ALBUTEROL 4 PUFF(S): 90 AEROSOL, METERED ORAL at 06:15

## 2018-10-28 RX ADMIN — CLOPIDOGREL BISULFATE 75 MILLIGRAM(S): 75 TABLET, FILM COATED ORAL at 12:11

## 2018-10-28 RX ADMIN — PROPOFOL 5.99 MICROGRAM(S)/KG/MIN: 10 INJECTION, EMULSION INTRAVENOUS at 04:49

## 2018-10-28 RX ADMIN — Medication 3 MILLILITER(S): at 21:12

## 2018-10-28 RX ADMIN — Medication 25 MILLIGRAM(S): at 12:11

## 2018-10-28 RX ADMIN — Medication 40 MILLIGRAM(S): at 18:26

## 2018-10-28 RX ADMIN — Medication 20 MILLIGRAM(S): at 12:34

## 2018-10-28 RX ADMIN — Medication 2: at 18:27

## 2018-10-28 RX ADMIN — ALBUTEROL 4 PUFF(S): 90 AEROSOL, METERED ORAL at 01:57

## 2018-10-28 RX ADMIN — ATORVASTATIN CALCIUM 40 MILLIGRAM(S): 80 TABLET, FILM COATED ORAL at 21:33

## 2018-10-28 RX ADMIN — Medication 25 MILLIGRAM(S): at 21:33

## 2018-10-28 NOTE — PROGRESS NOTE ADULT - SUBJECTIVE AND OBJECTIVE BOX
[facility] - Saud Aguilar Grossman, Génesis, [place], Kumar Hodges  Office Number:  [phone]    Remains intubated/sedated. Off ketamine and pressors. Hypothermic    ROS: negative unless otherwise mentioned.    Telemetry:  SR    MEDICATIONS  (STANDING):  ALBUTerol    90 MICROgram(s) HFA Inhaler 4 Puff(s) Inhalation every 2 hours  ALBUTerol/ipratropium for Nebulization 3 milliLiter(s) Nebulizer every 4 hours  azithromycin  IVPB      azithromycin  IVPB 500 milliGRAM(s) IV Intermittent every 24 hours  cefTRIAXone   IVPB 1 Gram(s) IV Intermittent every 24 hours  chlorhexidine 0.12% Liquid 15 milliLiter(s) Swish and Spit two times a day  heparin  Injectable 5000 Unit(s) SubCutaneous every 8 hours  insulin Infusion 1 Unit(s)/Hr (1 mL/Hr) IV Continuous <Continuous>  ketamine Infusion. 0.4 mG/kG/Hr (19.6 mL/Hr) IV Continuous <Continuous>  methylPREDNISolone sodium succinate Injectable 40 milliGRAM(s) IV Push every 8 hours  pantoprazole  Injectable 40 milliGRAM(s) IV Push daily  propofol Infusion 10 MICROgram(s)/kG/Min (5.988 mL/Hr) IV Continuous <Continuous>  sodium chloride 0.9%. 1000 milliLiter(s) (75 mL/Hr) IV Continuous <Continuous>  tamsulosin 0.4 milliGRAM(s) Oral at bedtime    MEDICATIONS  (PRN):      Allergies    No Known Allergies    Intolerances    shellfish (Nausea)      Vital Signs Last 24 Hrs  T(C): 35.7 (28 Oct 2018 00:20), Max: 36.3 (27 Oct 2018 19:00)  T(F): 96.3 (28 Oct 2018 00:20), Max: 97.3 (27 Oct 2018 19:00)  HR: 95 (28 Oct 2018 06:30) (80 - 105)  BP: 116/71 (28 Oct 2018 06:00) (97/57 - 152/87)  BP(mean): 89 (28 Oct 2018 06:00) (71 - 113)  RR: 17 (28 Oct 2018 06:30) (12 - 34)  SpO2: 100% (28 Oct 2018 06:30) (74% - 100%)    I&O's Summary    26 Oct 2018 07:01  -  27 Oct 2018 07:00  --------------------------------------------------------  IN: 1786.9 mL / OUT: 380 mL / NET: 1406.9 mL    27 Oct 2018 07:01  -  28 Oct 2018 06:57  --------------------------------------------------------  IN: 3030 mL / OUT: 2305 mL / NET: 725 mL        ON EXAM:    Constitutional: NAD, intubated and sedated  Lungs:  decreased bilaterally, No wheezing, rales or rhonchi  Cardiovascular: borderline tachycardic, S1 and S2 positive.  No murmurs, rubs, gallops or clicks  Gastrointestinal: Bowel Sounds present, soft, nontender.   Lymph: No peripheral edema. No cervical lymphadenopathy.  Neurological: unable to assess  Skin: No rashes or ulcers   Psych:  unable to assess    LABS: All Labs Reviewed:                        11.9   17.35 )-----------( 264      ( 28 Oct 2018 06:10 )             38.2                         11.8   18.97 )-----------( 264      ( 27 Oct 2018 06:38 )             37.8                         12.6   15.31 )-----------( 319      ( 26 Oct 2018 12:58 )             41.1     28 Oct 2018 06:10    143    |  103    |  22     ----------------------------<  182    4.9     |  34     |  0.97   27 Oct 2018 14:11    141    |  103    |  22     ----------------------------<  193    5.1     |  34     |  1.20   27 Oct 2018 06:38    140    |  103    |  21     ----------------------------<  125    4.8     |  32     |  1.20     Ca    9.9        28 Oct 2018 06:10  Ca    9.7        27 Oct 2018 14:11  Ca    9.4        27 Oct 2018 06:38  Phos  2.7       28 Oct 2018 06:10  Phos  3.0       27 Oct 2018 14:11  Phos  3.1       27 Oct 2018 06:38  Mg     2.5       28 Oct 2018 06:10  Mg     1.9       27 Oct 2018 14:11  Mg     2.1       27 Oct 2018 06:38    TPro  6.4    /  Alb  3.0    /  TBili  0.3    /  DBili  x      /  AST  24     /  ALT  72     /  AlkPhos  83     28 Oct 2018 06:10  TPro  6.7    /  Alb  3.2    /  TBili  0.3    /  DBili  x      /  AST  55     /  ALT  92     /  AlkPhos  81     27 Oct 2018 06:38  TPro  6.9    /  Alb  3.3    /  TBili  0.3    /  DBili  x      /  AST  84     /  ALT  100    /  AlkPhos  97     26 Oct 2018 12:58    PT/INR - ( 28 Oct 2018 06:10 )   PT: 10.4 sec;   INR: 0.96 ratio         PTT - ( 28 Oct 2018 06:10 )  PTT:30.1 sec  CARDIAC MARKERS ( 26 Oct 2018 21:39 )  .155 ng/mL / x     / 454 U/L / x     / 19.2 ng/mL      Blood Culture: Organism --  Gram Stain Blood -- Gram Stain   Few polymorphonuclear leukocytes per low power field  Moderate squamous epithelial cells per low power field  Numerous Gram positive cocci in pairs, chains and clusters  Specimen Source .Sputum Sputum - trap  Culture-Blood --    Organism --  Gram Stain Blood -- Gram Stain --  Specimen Source .Blood Blood  Culture-Blood --    Organism --  Gram Stain Blood -- Gram Stain --  Specimen Source .Blood Blood  Culture-Blood --    Organism --  Gram Stain Blood -- Gram Stain --  Specimen Source .Urine Catheterized  Culture-Blood --      10-26 @ 08:48  Pro Bnp 204

## 2018-10-28 NOTE — PROGRESS NOTE ADULT - ASSESSMENT
Mr. Donohue is a 77 yo M with asthma, COPD, chronic hypoxemic respiratory failure, CAD with CABG in 2004 presents with worsening respiratory distress over a few days.  Found to have severe hypercapnic respiratory failure, initially improved on BiPAP.  Transferred to ICU and had bradycardic/PEA arrest.   Etiology of his arrest presumably respiratory in etiology  Has not had recent cardiac issues and reports a normal stress test in 8/2018.   LV function normal as of 6/2018    - Remains intubated and sedated. Vent management per ICU  - Enzyme leak noted though presumably in setting of arrest. Continue to trend  - EKG is Sinus tachycardia without obvious ischemia  - No sign of volume overload  - No arrythmia on telemetry  - Repeat echo with normal LV function  - Please restart anti-platelets if no contraindication, and head CT negative  - Eventually restart on statin  - BP medications can eventually be restarted  - Watch creatinine and electrolytes  - Will follow with you    Critically ill. High risk of decompensation. >35 min spent taking care of patient.

## 2018-10-28 NOTE — PROGRESS NOTE ADULT - SUBJECTIVE AND OBJECTIVE BOX
78y  Male  No Known Allergies  shellfish (Nausea)    CC; Patient is a 78y old  Male who presents with a chief complaint of Respiratory Distress     HPI: 77 yo M with Hx asthma, COPD, chronic hypoxemic respiratory failure, CAD with CABG presented with worsening respiratory distress, admited 10/26 with SOB for 2 days with status asthmaticus but  found to have severe hypercapnic respiratory failure, initially improved on BiPAP. Hospital Course was then complicated by bradycardic PEA arrest resulting anoxic encephalopathy and MERRILL.      PAST MEDICAL & SURGICAL HISTORY:  PVD (peripheral vascular disease)  Hypertension  COPD (chronic obstructive pulmonary disease)  Osteomyelitis  Dyslipidemia  CAD (Coronary Artery Disease)  Diabetes Mellitus, Type II  CABG (Coronary Artery Bypass Graft)    FAMILY HISTORY:  Family history of diabetes mellitus (Sibling)      Vitals   Vital Signs Last 24 Hrs  T(C): 37.3 (28 Oct 2018 19:40), Max: 37.3 (28 Oct 2018 19:40)  T(F): 99.1 (28 Oct 2018 19:40), Max: 99.1 (28 Oct 2018 19:40)  HR: 108 (28 Oct 2018 21:12) (65 - 122)  BP: 166/84 (28 Oct 2018 09:00) (104/60 - 166/84)  BP(mean): 115 (28 Oct 2018 09:00) (77 - 116)  RR: 17 (28 Oct 2018 20:30) (12 - 34)  SpO2: 100% (28 Oct 2018 21:12) (99% - 100%)  ABG - ( 28 Oct 2018 17:44 )  pH, Arterial: 7.42  pH, Blood: x     /  pCO2: 52    /  pO2: 96    / HCO3: 31    / Base Excess: 7.8   /  SaO2: 98                I&O's Summary    27 Oct 2018 07:01  -  28 Oct 2018 07:00  --------------------------------------------------------  IN: 3030 mL / OUT: 2305 mL / NET: 725 mL    28 Oct 2018 07:01  -  28 Oct 2018 23:58  --------------------------------------------------------  IN: 1359 mL / OUT: 2190 mL / NET: -831 mL        LABS  10-28    143  |  103  |  22  ----------------------------<  182<H>  4.9   |  34<H>  |  0.97    Ca    9.9      28 Oct 2018 06:10  Phos  2.7     10-28  Mg     2.5     10-28    TPro  6.4  /  Alb  3.0<L>  /  TBili  0.3  /  DBili  x   /  AST  24  /  ALT  72  /  AlkPhos  83  10-28                          11.9   17.35 )-----------( 264      ( 28 Oct 2018 06:10 )             38.2     PT/INR - ( 28 Oct 2018 06:10 )   PT: 10.4 sec;   INR: 0.96 ratio         PTT - ( 28 Oct 2018 06:10 )  PTT:30.1 sec      LIVER FUNCTIONS - ( 28 Oct 2018 06:10 )  Alb: 3.0 g/dL / Pro: 6.4 g/dL / ALK PHOS: 83 U/L / ALT: 72 U/L / AST: 24 U/L / GGT: x           CAPILLARY BLOOD GLUCOSE      POCT Blood Glucose.: 174 mg/dL (28 Oct 2018 17:13)        VENT SETTINGS   Mode: CPAP with PS  FiO2: 30  PEEP: 5  PS: 10  ITime: 1  MAP: 8  PIP: 17      Meds  MEDICATIONS  (STANDING):  ALBUTerol/ipratropium for Nebulization 3 milliLiter(s) Nebulizer every 4 hours  aspirin  chewable 81 milliGRAM(s) Oral daily  atorvastatin 40 milliGRAM(s) Oral at bedtime  cefTRIAXone   IVPB 1 Gram(s) IV Intermittent every 24 hours  chlorhexidine 0.12% Liquid 15 milliLiter(s) Swish and Spit two times a day  clopidogrel Tablet 75 milliGRAM(s) Oral daily  dexmedetomidine Infusion 0.3 MICROgram(s)/kG/Hr (7.35 mL/Hr) IV Continuous <Continuous>  heparin  Injectable 5000 Unit(s) SubCutaneous every 8 hours  hydrALAZINE 25 milliGRAM(s) Oral every 8 hours  insulin glargine Injectable (LANTUS) 6 Unit(s) SubCutaneous at bedtime  insulin lispro (HumaLOG) corrective regimen sliding scale   SubCutaneous every 6 hours  methylPREDNISolone sodium succinate Injectable 40 milliGRAM(s) IV Push every 12 hours  pantoprazole  Injectable 40 milliGRAM(s) IV Push daily  propofol Infusion 10 MICROgram(s)/kG/Min (5.988 mL/Hr) IV Continuous <Continuous>  tamsulosin 0.4 milliGRAM(s) Oral at bedtime      REVIEW OF SYSTEMS:    CONSTITUTIONAL: No fever, weight loss, or fatigue  EYES: No eye pain, visual disturbances, or discharge  ENMT:  No difficulty hearing, tinnitus, vertigo; No sinus or throat pain  NECK: No pain or stiffness  BREASTS: No pain, masses, or nipple discharge  RESPIRATORY: No cough, wheezing, chills or hemoptysis; No shortness of breath  CARDIOVASCULAR: No chest pain, palpitations, dizziness, or leg swelling  GASTROINTESTINAL: No abdominal or epigastric pain. No nausea, vomiting, or hematemesis; No diarrhea or constipation. No melena or hematochezia.  GENITOURINARY: No dysuria, frequency, hematuria, or incontinence  NEUROLOGICAL: No headaches, memory loss, loss of strength, numbness, or tremors  SKIN: No itching, burning, rashes, or lesions   LYMPH NODES: No enlarged glands  ENDOCRINE: No heat or cold intolerance; No hair loss  MUSCULOSKELETAL: No joint pain or swelling; No muscle, back, or extremity pain  PSYCHIATRIC: No depression, anxiety, mood swings, or difficulty sleeping  HEME/LYMPH: No easy bruising, or bleeding gums  ALLERY AND IMMUNOLOGIC: No hives or eczema      Physicial Exam:     Constitutional: NAD, well-groomed, well-developed  HEENT: PERRLA, EOMI, no drainage or redness  Neck: No bruits; no thyromegaly or nodules,  No JVD  Back: Normal spine flexure, No CVA tenderness, No deformity or limitation of movement  Respiratory: Breath Sounds equal & clear to percussion & auscultation, no accessory muscle use  Cardiovascular: Regular rate & rhythm, normal S1, S2; no murmurs, gallops or rubs; no S3, S4  Gastrointestinal: Soft, non-tender, non distended no hepatosplenomegaly, normal bowel sounds  Extremities: No peripheral edema, No cyanosis, clubbing   Vascular: Equal and normal pulses: 2+ peripheral pulses throughout  Neurological: GCS:    A&O x 3; no sensory, motor  deficits, normal reflexes  Psychiatric: Normal mood, normal affect  Musculoskeletal: No joint pain, swelling or deformity; no limitation of movement  Skin: No rashes 78y  Male  No Known Allergies  shellfish (Nausea)    CC; Patient is a 78y old  Male who presents with a chief complaint of Respiratory Distress     HPI: 79 yo M with Hx asthma, COPD, chronic hypoxemic respiratory failure, CAD with CABG presented with worsening respiratory distress, admited 10/26 with SOB for 2 days with status asthmaticus but  found to have severe hypercapnic respiratory failure, initially improved on BiPAP. Hospital Course was then complicated by bradycardic PEA arrest resulting anoxic encephalopathy and MERRILL.    PAST MEDICAL & SURGICAL HISTORY:  PVD (peripheral vascular disease)  Hypertension  COPD (chronic obstructive pulmonary disease)  Osteomyelitis  Dyslipidemia  CAD (Coronary Artery Disease)  Diabetes Mellitus, Type II  CABG (Coronary Artery Bypass Graft)    FAMILY HISTORY:  Family history of diabetes mellitus (Sibling)    Vital Signs Last 24 Hrs  T(C): 37.3 (28 Oct 2018 19:40), Max: 37.3 (28 Oct 2018 19:40)  T(F): 99.1 (28 Oct 2018 19:40), Max: 99.1 (28 Oct 2018 19:40)  HR: 108 (28 Oct 2018 21:12) (65 - 122)  BP: 166/84 (28 Oct 2018 09:00) (104/60 - 166/84)  BP(mean): 115 (28 Oct 2018 09:00) (77 - 116)  RR: 17 (28 Oct 2018 20:30) (12 - 34)  SpO2: 100% (28 Oct 2018 21:12) (99% - 100%)  ABG - ( 28 Oct 2018 17:44 )  pH, Arterial: 7.42  pH, Blood: x     /  pCO2: 52    /  pO2: 96    / HCO3: 31    / Base Excess: 7.8   /  SaO2: 98        I&O's Summary  27 Oct 2018 07:01  -  28 Oct 2018 07:00  --------------------------------------------------------  IN: 3030 mL / OUT: 2305 mL / NET: 725 mL    28 Oct 2018 07:01  -  28 Oct 2018 23:58  --------------------------------------------------------  IN: 1359 mL / OUT: 2190 mL / NET: -831 mL      LABS    143  |  103  |  22  ----------------------------<  182<H>  4.9   |  34<H>  |  0.97    Ca    9.9      28 Oct 2018 06:10  Phos  2.7     10-28  Mg     2.5     10-28    TPro  6.4  /  Alb  3.0<L>  /  TBili  0.3  /  DBili  x   /  AST  24  /  ALT  72  /  AlkPhos  83  10-28                        11.9   17.35 )-----------( 264      ( 28 Oct 2018 06:10 )             38.2     PT/INR - ( 28 Oct 2018 06:10 )   PT: 10.4 sec;   INR: 0.96 ratio    PTT - ( 28 Oct 2018 06:10 )  PTT:30.1 sec      LIVER FUNCTIONS - ( 28 Oct 2018 06:10 )  Alb: 3.0 g/dL / Pro: 6.4 g/dL / ALK PHOS: 83 U/L / ALT: 72 U/L / AST: 24 U/L / GGT: x           CAPILLARY BLOOD GLUCOSE  POCT Blood Glucose.: 174 mg/dL (28 Oct 2018 17:13)    VENT SETTINGS   Mode: CPAP with PS  FiO2: 30  PEEP: 5  PS: 10  ITime: 1  MAP: 8  PIP: 17      MEDICATIONS  (STANDING):  ALBUTerol/ipratropium for Nebulization 3 milliLiter(s) Nebulizer every 4 hours  aspirin  chewable 81 milliGRAM(s) Oral daily  atorvastatin 40 milliGRAM(s) Oral at bedtime  cefTRIAXone   IVPB 1 Gram(s) IV Intermittent every 24 hours  chlorhexidine 0.12% Liquid 15 milliLiter(s) Swish and Spit two times a day  clopidogrel Tablet 75 milliGRAM(s) Oral daily  dexmedetomidine Infusion 0.3 MICROgram(s)/kG/Hr (7.35 mL/Hr) IV Continuous <Continuous>  heparin  Injectable 5000 Unit(s) SubCutaneous every 8 hours  hydrALAZINE 25 milliGRAM(s) Oral every 8 hours  insulin glargine Injectable (LANTUS) 6 Unit(s) SubCutaneous at bedtime  insulin lispro (HumaLOG) corrective regimen sliding scale   SubCutaneous every 6 hours  methylPREDNISolone sodium succinate Injectable 40 milliGRAM(s) IV Push every 12 hours  pantoprazole  Injectable 40 milliGRAM(s) IV Push daily  propofol Infusion 10 MICROgram(s)/kG/Min (5.988 mL/Hr) IV Continuous <Continuous>  tamsulosin 0.4 milliGRAM(s) Oral at bedtime      REVIEW OF SYSTEMS:    Unable to obtain due to mechanical ventilation and sedation Precedex while intubated    Physicial Exam:     Constitutional: NAD, well-groomed, well-developed  HEENT: PERRLA, EOMI, no drainage or redness  Neck: No bruits; no thyromegaly or nodules,  No JVD  Respiratory: Breath Sounds equal & clear to percussion & auscultation, no accessory muscle use  Cardiovascular: Regular rate & rhythm, normal S1, S2; no murmurs, gallops or rubs; no S3, S4  Gastrointestinal: Soft, non-tender, non distended no hepatosplenomegaly, normal bowel sounds  Extremities: No peripheral edema, No cyanosis, clubbing   Vascular: Equal and normal pulses: 2+ peripheral pulses throughout  Neurological: follows commands while intubated, no sensory, motor  deficits, normal reflexes  Musculoskeletal: No joint pain, swelling or deformity; no limitation of movement  Skin: No rashes

## 2018-10-28 NOTE — PROGRESS NOTE ADULT - ATTENDING COMMENTS
79 yo M with Hx asthma, COPD, chronic hypoxemic respiratory failure, CAD with status asthmaticus and profound hypercapnic respiratory failure, complicated by PEA cardiac arrest (likely due to hypercapnic acidosis), anoxic encephalopathy, MERRILL, severe hyperglycemia.    --anoxic encephalopathy s/p cardiac arrest  today off sedation pt now following commands  may continue to require sedation for tolerance of vent and ETT, add precedex  --s/p cardiac arrest, likely due to profound acidosis and hypercapnia  CAD, cont ASA, plavix   f/u echo  --status asthmaticus with profound hypercapnic respiratory failure, now markedly improved  Pplat today 13, unable to perform expiratory pause maneuver for autoPEEP  decrease solumedrol to q12h, change bronchodilators to q4h standing and q2h prn  serial ABGs, developing alkalosis, decrease ventilation to maintain CO2 about mid50s (baseline)  complete 3d azithro today, and will complete 5d of empiric CTX  --cont TF, cont GI ppx  --MERRILL improved  volume overloaded by bedside US, lasix 20 IV   --DM type 2 better controlled  transition insulin gtt to lantus and ISS  --discussed plan with wife in detail  --pt critically ill, total CC time 60min

## 2018-10-28 NOTE — PROGRESS NOTE ADULT - ASSESSMENT
Assessment and Plan:     Assessment  77 yo M with Hx asthma, COPD, chronic hypoxemic respiratory failure, CAD with CABG presents with worsening respiratory distress over a few days.  Found to have severe hypercapnic respiratory failure, initially improved on BiPAP.  Transferred to ICU and had bradycardic/PEA arrest.     Neuro  Head CT performed with no indicators of ischemic infarct or hemorrage  Currently sedated on propofol-goal for today is to decrease propofol and assess LOC  continue targeted temperature management   ketamine drip stopped      Cardiovascular  Off pressors   TTE performed-EF 55-60%  restart aspirin & plavix given that head CT negative  continued hypertension--on diovan at home, will hold off on ACE/ARB in setting of MERRILL recovery, no BB secondary to asthma, will start on hydralazine 25mg oral every 8 hours  Bedside ultrasound revealed scattered B lines in lower lung fields and IVC 2.3 with no respiratory variation, will stop fluids and give lasix 20mg IV x1 dose    Pulmonary  Treatment regimen for status asthmaticus--decrease frequency  methylprednisone to 40mg IV every 12 hours, continue albuterol & duonebs  Continue ketamine drip for sedation and bronchodilator properties (fixed dose)    Endocrine  Blood glucose levels between 180-200 for last 24 hours, will start basal bolus.  Patient received 12units of humalog in 24 hour period.  Lantus 6mg once daily and humalog sliding scale    /Renal  Resolved MERRILL  Avoid nephrotoxins  Cunha for strict intake and output monitoring  flomax restarted    GI  Tolerating feeds, GI prophylaxis utilized    ID  Continue Azithromycin to be completed 10/28, continue ceftriaxone until 10/30  follow up blood & sputum cultures    Tubes/Lines  Right IJ central line, right radial A line, cunha catheter Assessment and Plan:     Assessment  79 yo M with Hx asthma, COPD, chronic hypoxemic respiratory failure, CAD with CABG presents with worsening respiratory distress over a few days.  Found to have severe hypercapnic respiratory failure, initially improved on BiPAP.  Transferred to ICU and had bradycardic/PEA arrest.     Neuro  Head CT performed with no indicators of ischemic infarct or hemorrage  Currently sedated on propofol-goal for today is to decrease propofol and assess LOC  continue targeted temperature management   ketamine drip stopped      Cardiovascular  Off pressors   TTE performed-EF 55-60%  restart aspirin & plavix given that head CT negative  continued hypertension--on diovan at home, will hold off on ACE/ARB in setting of MERRILL recovery, no BB secondary to asthma, will start on hydralazine 25mg oral every 8 hours  Bedside ultrasound revealed scattered B lines in lower lung fields and IVC 2.3 with no respiratory variation, will stop fluids and give lasix 20mg IV x1 dose    Pulmonary  Treatment regimen for status asthmaticus--decrease frequency  methylprednisone to 40mg IV every 12 hours, continue albuterol & duonebs    Endocrine  Blood glucose levels between 180-200 for last 24 hours, will start basal bolus.  Patient received 12units of humalog in 24 hour period.  Lantus 6mg once daily and humalog sliding scale    /Renal  Resolved MERRILL  Avoid nephrotoxins  Cunha for strict intake and output monitoring  flomax restarted    GI  Tolerating feeds, GI prophylaxis utilized    ID  Continue Azithromycin to be completed 10/28, continue ceftriaxone until 10/30  follow up blood & sputum cultures    Tubes/Lines  Right IJ central line, right radial A line, cunha catheter Assessment and Plan:     Assessment  77 yo M with Hx asthma, COPD, chronic hypoxemic respiratory failure, CAD with CABG presents with worsening respiratory distress over a few days.  Found to have severe hypercapnic respiratory failure, initially improved on BiPAP.  Hospital course complicated by bradycardic PEA arrest resulting in anoxic encephalopathy & MERRILL.    Neuro  Head CT performed with no indicators of ischemic infarct or hemorrage  Currently sedated on propofol-goal for today is to decrease propofol and assess LOC  continue targeted temperature management   ketamine drip stopped      Cardiovascular  Hemodynamically stable  TTE performed-EF 55-60%  restart aspirin & plavix (for CAD and PVD) given that head CT negative  continued hypertension--on diovan at home, will hold off on ACE/ARB in setting of MERRILL recovery, no BB secondary to asthma, will start on hydralazine 25mg oral every 8 hours  Bedside ultrasound revealed scattered B lines in lower lung fields and IVC 2.3 with no respiratory variation, will stop fluids and give lasix 20mg IV x1 dose    Pulmonary  Treatment regimen for status asthmaticus--decrease frequency  methylprednisone to 40mg IV every 12 hours, continue albuterol & duonebs  Continue ventilator management to improve ventilation in setting of status asthmaticus    Endocrine  Blood glucose levels between 180-200 for last 24 hours, will start basal bolus.  Patient received 12units of humalog in 24 hour period.  Lantus 6mg once daily and humalog sliding scale    /Renal  Resolved MERRILL  Avoid nephrotoxins  Cunha for strict intake and output monitoring  flomax restarted    GI  Tolerating feeds, GI prophylaxis utilized    ID  Continue Azithromycin to be completed 10/28, continue ceftriaxone until 10/30  follow up blood & sputum cultures    Tubes/Lines  Right IJ central line, right radial A line, cunha catheter

## 2018-10-28 NOTE — PROGRESS NOTE ADULT - SUBJECTIVE AND OBJECTIVE BOX
24 hour events:   ventilation improving over the course of the day yesterday  ketamine gtt weaned off    Review of Systems: LINA    T(F): 97 (10-28-18 @ 07:35), Max: 97.3 (10-27-18 @ 19:00)  HR: 65 (10-28-18 @ 07:53) (65 - 105)  BP: 116/71 (10-28-18 @ 06:00) (97/57 - 152/87)  RR: 17 (10-28-18 @ 06:30) (12 - 34)  SpO2: 100% (10-28-18 @ 07:53) (74% - 100%)  Wt(kg): --    Mode: AC/ CMV (Assist Control/ Continuous Mandatory Ventilation), RR (machine): 12, TV (machine): 500, FiO2: 30, PEEP: 5  10-27-18 @ 07:01  -  10-28-18 @ 07:00  --------------------------------------------------------  IN: 3030 mL / OUT: 2305 mL / NET: 725 mL        CAPILLARY BLOOD GLUCOSE      POCT Blood Glucose.: 190 mg/dL (28 Oct 2018 08:08)      I&O's Summary    27 Oct 2018 07:01  -  28 Oct 2018 07:00  --------------------------------------------------------  IN: 3030 mL / OUT: 2305 mL / NET: 725 mL        Physical Exam:   Gen:  Neuro:  HEENT:  Resp:  CVS:  Abd:  Ext:  Skin:    Meds:  azithromycin  IVPB   azithromycin  IVPB IV Intermittent  cefTRIAXone   IVPB IV Intermittent    tamsulosin Oral    insulin Infusion IV Continuous  methylPREDNISolone sodium succinate Injectable IV Push    ALBUTerol    90 MICROgram(s) HFA Inhaler Inhalation  ALBUTerol/ipratropium for Nebulization Nebulizer    propofol Infusion IV Continuous      heparin  Injectable SubCutaneous    pantoprazole  Injectable IV Push      sodium chloride 0.9%. IV Continuous      chlorhexidine 0.12% Liquid Swish and Spit                              11.9   17.35 )-----------( 264      ( 28 Oct 2018 06:10 )             38.2       10-28    143  |  103  |  22  ----------------------------<  182<H>  4.9   |  34<H>  |  0.97    Ca    9.9      28 Oct 2018 06:10  Phos  2.7     10-28  Mg     2.5     10-28    TPro  6.4  /  Alb  3.0<L>  /  TBili  0.3  /  DBili  x   /  AST  24  /  ALT  72  /  AlkPhos  83  10-28    Lactate 1.8           10-28 @ 06:10    Lactate 1.5           10-27 @ 14:10      CARDIAC MARKERS ( 26 Oct 2018 21:39 )  .155 ng/mL / x     / 454 U/L / x     / 19.2 ng/mL      PT/INR - ( 28 Oct 2018 06:10 )   PT: 10.4 sec;   INR: 0.96 ratio         PTT - ( 28 Oct 2018 06:10 )  PTT:30.1 sec  Urinalysis Basic - ( 26 Oct 2018 14:28 )    Color: Yellow / Appearance: Turbid / S.025 / pH: x  Gluc: x / Ketone: Negative  / Bili: Negative / Urobili: Negative   Blood: x / Protein: 150 mg/dL / Nitrite: Negative   Leuk Esterase: Negative / RBC: 0-2 /HPF / WBC 0-2   Sq Epi: x / Non Sq Epi: Few / Bacteria: Moderate      .Sputum Sputum - trap --   Few polymorphonuclear leukocytes per low power field  Moderate squamous epithelial cells per low power field  Numerous Gram positive cocci in pairs, chains and clusters 10-27 @ 10:23  .Blood Blood   No growth to date. -- 10-26 @ 20:03  .Blood Blood   No growth to date. -- 10-26 @ 20:01  .Urine Catheterized   No growth -- 10-26 @ 19:53      Rapid RVP Result: NotDetec (10-26 @ 10:58)          Radiology: ***  Bedside ultrasound: ***    CENTRAL LINE: N/Y          DATE INSERTED:              REMOVE: Y/N  SHARMA: N/Y                       DATE INSERTED:              REMOVE: Y/N  A-LINE: N/Y                       DATE INSERTED:              REMOVE: Y/N    GLOBAL ISSUE/BEST PRACTICE:  Analgesia:  Sedation:  CAM-ICU:   HOB elevation: yes  Stress ulcer prophylaxis:  VTE prophylaxis:  Glycemic control:  Nutrition:    CODE STATUS: *** 24 hour events:   ventilation improving over the course of the day yesterday  ketamine gtt weaned off    Review of Systems: LINA    T(F): 97 (10-28-18 @ 07:35), Max: 97.3 (10-27-18 @ 19:00)  HR: 65 (10-28-18 @ 07:53) (65 - 105)  BP: 116/71 (10-28-18 @ 06:00) (97/57 - 152/87)  RR: 17 (10-28-18 @ 06:30) (12 - 34)  SpO2: 100% (10-28-18 @ 07:53) (74% - 100%)  Wt(kg): --    Mode: AC/ CMV (Assist Control/ Continuous Mandatory Ventilation), RR (machine): 12, TV (machine): 500, FiO2: 30, PEEP: 5  10-27-18 @ 07:01  -  10-28-18 @ 07:00  --------------------------------------------------------  IN: 3030 mL / OUT: 2305 mL / NET: 725 mL      CAPILLARY BLOOD GLUCOSE    POCT Blood Glucose.: 190 mg/dL (28 Oct 2018 08:08)    I&O's Summary    27 Oct 2018 07:01  -  28 Oct 2018 07:00  --------------------------------------------------------  IN: 3030 mL / OUT: 2305 mL / NET: 725 mL    Physical Exam:   Constitutional: Appears comfortable, intubated & sedated  HEENT: pupils 2mm equal and reacting to light  Neck: supple, Central Line to RIJ-dressing, clean, dry & intact, ET tube, OG tube  Cardiovascular: Regular rate & rhythm, +S1/S2  Gastrointestinal: soft, non-tender, non distended, bowel sounds present x4  Extremities; No edema noted, no cyanosis or no clubbing,  Vascular: bilateral pedal pulse +2, capillary refill < 3 seconds  Neurological: intubated & sedated  Musculoskeletal: unable to assess d/t sedation   Skin: warm, dry, normal skin turgor    Meds:  azithromycin  IVPB   azithromycin  IVPB IV Intermittent  cefTRIAXone   IVPB IV Intermittent    tamsulosin Oral    insulin Infusion IV Continuous  methylPREDNISolone sodium succinate Injectable IV Push    ALBUTerol    90 MICROgram(s) HFA Inhaler Inhalation  ALBUTerol/ipratropium for Nebulization Nebulizer    propofol Infusion IV Continuous      heparin  Injectable SubCutaneous    pantoprazole  Injectable IV Push      sodium chloride 0.9%. IV Continuous      chlorhexidine 0.12% Liquid Swish and Spit               11.9   17.35 )-----------( 264      ( 28 Oct 2018 06:10 )             38.2       10-28    143  |  103  |  22  ----------------------------<  182<H>  4.9   |  34<H>  |  0.97    Ca    9.9      28 Oct 2018 06:10  Phos  2.7     10-28  Mg     2.5     10-28    TPro  6.4  /  Alb  3.0<L>  /  TBili  0.3  /  DBili  x   /  AST  24  /  ALT  72  /  AlkPhos  83  10-28    Lactate 1.8           10-28 @ 06:10    Lactate 1.5           10-27 @ 14:10      CARDIAC MARKERS ( 26 Oct 2018 21:39 )  .155 ng/mL / x     / 454 U/L / x     / 19.2 ng/mL      PT/INR - ( 28 Oct 2018 06:10 )   PT: 10.4 sec;   INR: 0.96 ratio         PTT - ( 28 Oct 2018 06:10 )  PTT:30.1 sec  Urinalysis Basic - ( 26 Oct 2018 14:28 )    Color: Yellow / Appearance: Turbid / S.025 / pH: x  Gluc: x / Ketone: Negative  / Bili: Negative / Urobili: Negative   Blood: x / Protein: 150 mg/dL / Nitrite: Negative   Leuk Esterase: Negative / RBC: 0-2 /HPF / WBC 0-2   Sq Epi: x / Non Sq Epi: Few / Bacteria: Moderate      .Sputum Sputum - trap --   Few polymorphonuclear leukocytes per low power field  Moderate squamous epithelial cells per low power field  Numerous Gram positive cocci in pairs, chains and clusters 10-27 @ 10:23  .Blood Blood   No growth to date. -- 10-26 @ 20:03  .Blood Blood   No growth to date. -- 10-26 @ 20:01  .Urine Catheterized   No growth -- 10-26 @ 19:53      Rapid RVP Result: NotDetec (10-26 @ 10:58)          Radiology: ***< from: CT Head No Cont (10.28.18 @ 09:46) >  XAM:  CT BRAIN                            PROCEDURE DATE:  10/28/2018          INTERPRETATION:  CLINICAL STATEMENT: Altered consciousness    TECHNIQUE: CT of the head was performed without IV contrast.    COMPARISON: None.    FINDINGS:  There is moderate diffuse parenchymal volume loss. There are areas of low   attenuation in the periventricular white matter likely related to mild   chronic microvascular ischemic changes.    There is no acute intracranial hemorrhage, parenchymal mass, or midline   shift. There is no acute territorial infarct. There is no hydrocephalus.    Extra-axial mass measuring 2.2 x 1.4 cm noted in the left parietal region   containing calcifications which may represent meningioma.    The cranium is intact.         Mild mucosal thickening paranasal sinuses.    IMPRESSION:  No acute intracranial hemorrhage or acute territorial infarct.      ALEKS HERBERT M.D., ATTENDING RADIOLOGIST  This document has been electronically signed. Oct 28 2018  9:59AM       < end of copied text >    Bedside ultrasound: ***Scattered B lines, IVC 2.3 with no respiratory variation      CENTRAL LINE:RIJ      DATE INSERTED:  10/26/18            REMOVE: Y/N  SHARMA: Yes                DATE INSERTED: 10/26/18             REMOVE: Y/N  A-LINE: Y (right radial)             DATE INSERTED: 10/26/18             REMOVE: Y/N    GLOBAL ISSUE/BEST PRACTICE:  Analgesia: n/a  Sedation propofol drip  CAM-ICU: n/a  HOB elevation: yes  Stress ulcer prophylaxis: Pantoprazole 40mg IVP twice daily  VTE prophylaxis: he eryn 5000 units SQ every 8 hours  Glycemic control: blood glucose monitoring every 6 hours, insulin coverage  Nutrition: feedings via OG tube    CODE STATUS: ***Full Code 24 hour events:   ventilation improving over the course of the day yesterday  ketamine gtt weaned off    Review of Systems: LINA    T(F): 97 (10-28-18 @ 07:35), Max: 97.3 (10-27-18 @ 19:00)  HR: 65 (10-28-18 @ 07:53) (65 - 105)  BP: 116/71 (10-28-18 @ 06:00) (97/57 - 152/87)  RR: 17 (10-28-18 @ 06:30) (12 - 34)  SpO2: 100% (10-28-18 @ 07:53) (74% - 100%)  Wt(kg): --    Mode: AC/ CMV (Assist Control/ Continuous Mandatory Ventilation), RR (machine): 12, TV (machine): 500, FiO2: 30, PEEP: 5  10-27-18 @ 07:01  -  10-28-18 @ 07:00  --------------------------------------------------------  IN: 3030 mL / OUT: 2305 mL / NET: 725 mL      CAPILLARY BLOOD GLUCOSE    POCT Blood Glucose.: 190 mg/dL (28 Oct 2018 08:08)    I&O's Summary    27 Oct 2018 07:01  -  28 Oct 2018 07:00  --------------------------------------------------------  IN: 3030 mL / OUT: 2305 mL / NET: 725 mL    Physical Exam:   Constitutional: Appears comfortable, intubated & sedated  HEENT: pupils 2mm equal and reacting to light  Neck: supple, Central Line to RIJ-dressing, clean, dry & intact, ET tube, OG tube  Cardiovascular: Regular rate & rhythm, +S1/S2  Respiratory: Inspiratory & expiratory wheezes, on ventilator, no rales or rhonchi  Gastrointestinal: soft, non-tender, non distended, bowel sounds present x4  Extremities; No edema noted, no cyanosis or no clubbing,  Vascular: bilateral pedal pulse +2, capillary refill < 3 seconds  Neurological: intubated & sedated  Musculoskeletal: unable to assess d/t sedation   Skin: warm, dry, normal skin turgor    Meds:  azithromycin  IVPB   azithromycin  IVPB IV Intermittent  cefTRIAXone   IVPB IV Intermittent    tamsulosin Oral    insulin Infusion IV Continuous  methylPREDNISolone sodium succinate Injectable IV Push    ALBUTerol    90 MICROgram(s) HFA Inhaler Inhalation  ALBUTerol/ipratropium for Nebulization Nebulizer    propofol Infusion IV Continuous      heparin  Injectable SubCutaneous    pantoprazole  Injectable IV Push      sodium chloride 0.9%. IV Continuous      chlorhexidine 0.12% Liquid Swish and Spit               11.9   17.35 )-----------( 264      ( 28 Oct 2018 06:10 )             38.2       10-28    143  |  103  |  22  ----------------------------<  182<H>  4.9   |  34<H>  |  0.97    Ca    9.9      28 Oct 2018 06:10  Phos  2.7     10-28  Mg     2.5     10-28    TPro  6.4  /  Alb  3.0<L>  /  TBili  0.3  /  DBili  x   /  AST  24  /  ALT  72  /  AlkPhos  83  10-28    Lactate 1.8           10-28 @ 06:10    Lactate 1.5           10-27 @ 14:10      CARDIAC MARKERS ( 26 Oct 2018 21:39 )  .155 ng/mL / x     / 454 U/L / x     / 19.2 ng/mL      PT/INR - ( 28 Oct 2018 06:10 )   PT: 10.4 sec;   INR: 0.96 ratio         PTT - ( 28 Oct 2018 06:10 )  PTT:30.1 sec  Urinalysis Basic - ( 26 Oct 2018 14:28 )    Color: Yellow / Appearance: Turbid / S.025 / pH: x  Gluc: x / Ketone: Negative  / Bili: Negative / Urobili: Negative   Blood: x / Protein: 150 mg/dL / Nitrite: Negative   Leuk Esterase: Negative / RBC: 0-2 /HPF / WBC 0-2   Sq Epi: x / Non Sq Epi: Few / Bacteria: Moderate      .Sputum Sputum - trap --   Few polymorphonuclear leukocytes per low power field  Moderate squamous epithelial cells per low power field  Numerous Gram positive cocci in pairs, chains and clusters 10-27 @ 10:23  .Blood Blood   No growth to date. -- 10-26 @ 20:03  .Blood Blood   No growth to date. -- 10-26 @ 20:01  .Urine Catheterized   No growth -- 10-26 @ 19:53      Rapid RVP Result: NotDetec (10-26 @ 10:58)          Radiology: ***< from: CT Head No Cont (10.28.18 @ 09:46) >  XAM:  CT BRAIN                            PROCEDURE DATE:  10/28/2018          INTERPRETATION:  CLINICAL STATEMENT: Altered consciousness    TECHNIQUE: CT of the head was performed without IV contrast.    COMPARISON: None.    FINDINGS:  There is moderate diffuse parenchymal volume loss. There are areas of low   attenuation in the periventricular white matter likely related to mild   chronic microvascular ischemic changes.    There is no acute intracranial hemorrhage, parenchymal mass, or midline   shift. There is no acute territorial infarct. There is no hydrocephalus.    Extra-axial mass measuring 2.2 x 1.4 cm noted in the left parietal region   containing calcifications which may represent meningioma.    The cranium is intact.         Mild mucosal thickening paranasal sinuses.    IMPRESSION:  No acute intracranial hemorrhage or acute territorial infarct.      ALEKS HERBERT M.D., ATTENDING RADIOLOGIST  This document has been electronically signed. Oct 28 2018  9:59AM       < end of copied text >    Bedside ultrasound: ***Scattered B lines, IVC 2.3 with no respiratory variation      CENTRAL LINE:RIJ      DATE INSERTED:  10/26/18            REMOVE: Y/N  SHARMA: Yes                DATE INSERTED: 10/26/18             REMOVE: Y/N  A-LINE: Y (right radial)             DATE INSERTED: 10/26/18             REMOVE: Y/N    GLOBAL ISSUE/BEST PRACTICE:  Analgesia: n/a  Sedation propofol drip  CAM-ICU: n/a  HOB elevation: yes  Stress ulcer prophylaxis: Pantoprazole 40mg IVP twice daily  VTE prophylaxis: he eryn 5000 units SQ every 8 hours  Glycemic control: blood glucose monitoring every 6 hours, insulin coverage  Nutrition: feedings via OG tube    CODE STATUS: ***Full Code 24 hour events:   ventilation improving over the course of the day yesterday  ketamine gtt weaned off  improving acedemia    Review of Systems: LINA    T(F): 97 (10-28-18 @ 07:35), Max: 97.3 (10-27-18 @ 19:00)  HR: 65 (10-28-18 @ 07:53) (65 - 105)  BP: 116/71 (10-28-18 @ 06:00) (97/57 - 152/87)  RR: 17 (10-28-18 @ 06:30) (12 - 34)  SpO2: 100% (10-28-18 @ 07:53) (74% - 100%)  Wt(kg): --    Mode: AC/ CMV (Assist Control/ Continuous Mandatory Ventilation), RR (machine): 12, TV (machine): 500, FiO2: 30, PEEP: 5  10-27-18 @ 07:01  -  10-28-18 @ 07:00  --------------------------------------------------------  IN: 3030 mL / OUT: 2305 mL / NET: 725 mL      CAPILLARY BLOOD GLUCOSE    POCT Blood Glucose.: 190 mg/dL (28 Oct 2018 08:08)    I&O's Summary    27 Oct 2018 07:01  -  28 Oct 2018 07:00  --------------------------------------------------------  IN: 3030 mL / OUT: 2305 mL / NET: 725 mL    Physical Exam:   Constitutional: Appears comfortable, intubated & sedated  HEENT: pupils 2mm equal and reacting to light  Neck: supple, Central Line to RIJ-dressing, clean, dry & intact, ET tube, OG tube  Cardiovascular: Regular rate & rhythm, +S1/S2  Respiratory: Inspiratory & expiratory wheezes, on ventilator, no rales or rhonchi  Gastrointestinal: soft, non-tender, non distended, bowel sounds present x4  Extremities; No edema noted, no cyanosis or no clubbing,  Vascular: bilateral pedal pulse +2, capillary refill < 3 seconds  Neurological: intubated & sedated  Musculoskeletal: unable to assess d/t sedation   Skin: warm, dry, normal skin turgor    Meds:  azithromycin  IVPB   azithromycin  IVPB IV Intermittent  cefTRIAXone   IVPB IV Intermittent    tamsulosin Oral    insulin Infusion IV Continuous  methylPREDNISolone sodium succinate Injectable IV Push    ALBUTerol    90 MICROgram(s) HFA Inhaler Inhalation  ALBUTerol/ipratropium for Nebulization Nebulizer    propofol Infusion IV Continuous      heparin  Injectable SubCutaneous    pantoprazole  Injectable IV Push      sodium chloride 0.9%. IV Continuous      chlorhexidine 0.12% Liquid Swish and Spit               11.9   17.35 )-----------( 264      ( 28 Oct 2018 06:10 )             38.2       10-28    143  |  103  |  22  ----------------------------<  182<H>  4.9   |  34<H>  |  0.97    Ca    9.9      28 Oct 2018 06:10  Phos  2.7     10-28  Mg     2.5     10-28    TPro  6.4  /  Alb  3.0<L>  /  TBili  0.3  /  DBili  x   /  AST  24  /  ALT  72  /  AlkPhos  83  10-28    Lactate 1.8           10-28 @ 06:10    Lactate 1.5           10-27 @ 14:10      CARDIAC MARKERS ( 26 Oct 2018 21:39 )  .155 ng/mL / x     / 454 U/L / x     / 19.2 ng/mL      PT/INR - ( 28 Oct 2018 06:10 )   PT: 10.4 sec;   INR: 0.96 ratio         PTT - ( 28 Oct 2018 06:10 )  PTT:30.1 sec  Urinalysis Basic - ( 26 Oct 2018 14:28 )    Color: Yellow / Appearance: Turbid / S.025 / pH: x  Gluc: x / Ketone: Negative  / Bili: Negative / Urobili: Negative   Blood: x / Protein: 150 mg/dL / Nitrite: Negative   Leuk Esterase: Negative / RBC: 0-2 /HPF / WBC 0-2   Sq Epi: x / Non Sq Epi: Few / Bacteria: Moderate      .Sputum Sputum - trap --   Few polymorphonuclear leukocytes per low power field  Moderate squamous epithelial cells per low power field  Numerous Gram positive cocci in pairs, chains and clusters 10-27 @ 10:23  .Blood Blood   No growth to date. -- 10-26 @ 20:03  .Blood Blood   No growth to date. -- 10-26 @ 20:01  .Urine Catheterized   No growth -- 10-26 @ 19:53      Rapid RVP Result: NotDetec (10-26 @ 10:58)          Radiology: ***< from: CT Head No Cont (10.28.18 @ 09:46) >  XAM:  CT BRAIN                            PROCEDURE DATE:  10/28/2018          INTERPRETATION:  CLINICAL STATEMENT: Altered consciousness    TECHNIQUE: CT of the head was performed without IV contrast.    COMPARISON: None.    FINDINGS:  There is moderate diffuse parenchymal volume loss. There are areas of low   attenuation in the periventricular white matter likely related to mild   chronic microvascular ischemic changes.    There is no acute intracranial hemorrhage, parenchymal mass, or midline   shift. There is no acute territorial infarct. There is no hydrocephalus.    Extra-axial mass measuring 2.2 x 1.4 cm noted in the left parietal region   containing calcifications which may represent meningioma.    The cranium is intact.         Mild mucosal thickening paranasal sinuses.    IMPRESSION:  No acute intracranial hemorrhage or acute territorial infarct.      ALEKS HERBERT M.D., ATTENDING RADIOLOGIST  This document has been electronically signed. Oct 28 2018  9:59AM       < end of copied text >    Bedside ultrasound: ***Scattered B lines, IVC 2.3 with no respiratory variation      CENTRAL LINE:RIJ      DATE INSERTED:  10/26/18            REMOVE: Y/N  SHARMA: Yes                DATE INSERTED: 10/26/18             REMOVE: Y/N  A-LINE: Y (right radial)             DATE INSERTED: 10/26/18             REMOVE: Y/N    GLOBAL ISSUE/BEST PRACTICE:  Analgesia: n/a  Sedation propofol drip  CAM-ICU: n/a  HOB elevation: yes  Stress ulcer prophylaxis: Pantoprazole 40mg IVP twice daily  VTE prophylaxis: he eryn 5000 units SQ every 8 hours  Glycemic control: blood glucose monitoring every 6 hours, insulin coverage  Nutrition: feedings via OG tube    CODE STATUS: ***Full Code 24 hour events:   ventilation improving over the course of the day yesterday  ketamine gtt weaned off  improving acidemia    Review of Systems: LINA    T(F): 97 (10-28-18 @ 07:35), Max: 97.3 (10-27-18 @ 19:00)  HR: 65 (10-28-18 @ 07:53) (65 - 105)  BP: 116/71 (10-28-18 @ 06:00) (97/57 - 152/87)  RR: 17 (10-28-18 @ 06:30) (12 - 34)  SpO2: 100% (10-28-18 @ 07:53) (74% - 100%)  Wt(kg): --    Mode: AC/ CMV (Assist Control/ Continuous Mandatory Ventilation), RR (machine): 12, TV (machine): 500, FiO2: 30, PEEP: 5  10-27-18 @ 07:01  -  10-28-18 @ 07:00  --------------------------------------------------------  IN: 3030 mL / OUT: 2305 mL / NET: 725 mL      CAPILLARY BLOOD GLUCOSE    POCT Blood Glucose.: 190 mg/dL (28 Oct 2018 08:08)    I&O's Summary    27 Oct 2018 07:01  -  28 Oct 2018 07:00  --------------------------------------------------------  IN: 3030 mL / OUT: 2305 mL / NET: 725 mL    Physical Exam:   Constitutional: Appears comfortable, intubated & sedated  HEENT: pupils 2mm equal and reacting to light  Neck: supple, Central Line to RIJ-dressing, clean, dry & intact, ET tube, OG tube  Cardiovascular: Regular rate & rhythm, +S1/S2  Respiratory: Inspiratory & expiratory wheezes, on ventilator, no rales or rhonchi  Gastrointestinal: soft, non-tender, non distended, bowel sounds present x4  Extremities; No edema noted, no cyanosis or no clubbing,  Vascular: bilateral pedal pulse +2, capillary refill < 3 seconds  Neurological: intubated & sedated  Musculoskeletal: unable to assess d/t sedation   Skin: warm, dry, normal skin turgor    Meds:  azithromycin  IVPB   azithromycin  IVPB IV Intermittent  cefTRIAXone   IVPB IV Intermittent    tamsulosin Oral    insulin Infusion IV Continuous  methylPREDNISolone sodium succinate Injectable IV Push    ALBUTerol    90 MICROgram(s) HFA Inhaler Inhalation  ALBUTerol/ipratropium for Nebulization Nebulizer    propofol Infusion IV Continuous      heparin  Injectable SubCutaneous    pantoprazole  Injectable IV Push      sodium chloride 0.9%. IV Continuous      chlorhexidine 0.12% Liquid Swish and Spit               11.9   17.35 )-----------( 264      ( 28 Oct 2018 06:10 )             38.2       10-28    143  |  103  |  22  ----------------------------<  182<H>  4.9   |  34<H>  |  0.97    Ca    9.9      28 Oct 2018 06:10  Phos  2.7     10-28  Mg     2.5     10-28    TPro  6.4  /  Alb  3.0<L>  /  TBili  0.3  /  DBili  x   /  AST  24  /  ALT  72  /  AlkPhos  83  10-28    Lactate 1.8           10-28 @ 06:10    Lactate 1.5           10-27 @ 14:10      CARDIAC MARKERS ( 26 Oct 2018 21:39 )  .155 ng/mL / x     / 454 U/L / x     / 19.2 ng/mL      PT/INR - ( 28 Oct 2018 06:10 )   PT: 10.4 sec;   INR: 0.96 ratio         PTT - ( 28 Oct 2018 06:10 )  PTT:30.1 sec  Urinalysis Basic - ( 26 Oct 2018 14:28 )    Color: Yellow / Appearance: Turbid / S.025 / pH: x  Gluc: x / Ketone: Negative  / Bili: Negative / Urobili: Negative   Blood: x / Protein: 150 mg/dL / Nitrite: Negative   Leuk Esterase: Negative / RBC: 0-2 /HPF / WBC 0-2   Sq Epi: x / Non Sq Epi: Few / Bacteria: Moderate      .Sputum Sputum - trap --   Few polymorphonuclear leukocytes per low power field  Moderate squamous epithelial cells per low power field  Numerous Gram positive cocci in pairs, chains and clusters 10-27 @ 10:23  .Blood Blood   No growth to date. -- 10-26 @ 20:03  .Blood Blood   No growth to date. -- 10-26 @ 20:01  .Urine Catheterized   No growth -- 10-26 @ 19:53      Rapid RVP Result: NotDetec (10-26 @ 10:58)          Radiology: ***< from: CT Head No Cont (10.28.18 @ 09:46) >  XAM:  CT BRAIN                            PROCEDURE DATE:  10/28/2018          INTERPRETATION:  CLINICAL STATEMENT: Altered consciousness    TECHNIQUE: CT of the head was performed without IV contrast.    COMPARISON: None.    FINDINGS:  There is moderate diffuse parenchymal volume loss. There are areas of low   attenuation in the periventricular white matter likely related to mild   chronic microvascular ischemic changes.    There is no acute intracranial hemorrhage, parenchymal mass, or midline   shift. There is no acute territorial infarct. There is no hydrocephalus.    Extra-axial mass measuring 2.2 x 1.4 cm noted in the left parietal region   containing calcifications which may represent meningioma.    The cranium is intact.         Mild mucosal thickening paranasal sinuses.    IMPRESSION:  No acute intracranial hemorrhage or acute territorial infarct.      ALEKS HERBERT M.D., ATTENDING RADIOLOGIST  This document has been electronically signed. Oct 28 2018  9:59AM       < end of copied text >    Bedside ultrasound: ***Scattered B lines, IVC 2.3 with no respiratory variation      CENTRAL LINE:RIJ      DATE INSERTED:  10/26/18            REMOVE: Y/N  SHARMA: Yes                DATE INSERTED: 10/26/18             REMOVE: Y/N  A-LINE: Y (right radial)             DATE INSERTED: 10/26/18             REMOVE: Y/N    GLOBAL ISSUE/BEST PRACTICE:  Analgesia: n/a  Sedation propofol drip  CAM-ICU: n/a  HOB elevation: yes  Stress ulcer prophylaxis: Pantoprazole 40mg IVP twice daily  VTE prophylaxis: he eryn 5000 units SQ every 8 hours  Glycemic control: blood glucose monitoring every 6 hours, insulin coverage  Nutrition: feedings via OG tube    CODE STATUS: ***Full Code 24 hour events:   ventilation improving over the course of the day yesterday  ketamine gtt weaned off  improving acidemia    Review of Systems: LINA patient intubated & sedated    T(F): 97 (10-28-18 @ 07:35), Max: 97.3 (10-27-18 @ 19:00)  HR: 65 (10-28-18 @ 07:53) (65 - 105)  BP: 116/71 (10-28-18 @ 06:00) (97/57 - 152/87)  RR: 17 (10-28-18 @ 06:30) (12 - 34)  SpO2: 100% (10-28-18 @ 07:53) (74% - 100%)  Wt(kg): --    Mode: AC/ CMV (Assist Control/ Continuous Mandatory Ventilation), RR (machine): 12, TV (machine): 500, FiO2: 30, PEEP: 5  10-27-18 @ 07:01  -  10-28-18 @ 07:00  --------------------------------------------------------  IN: 3030 mL / OUT: 2305 mL / NET: 725 mL      CAPILLARY BLOOD GLUCOSE    POCT Blood Glucose.: 190 mg/dL (28 Oct 2018 08:08)    I&O's Summary    27 Oct 2018 07:01  -  28 Oct 2018 07:00  --------------------------------------------------------  IN: 3030 mL / OUT: 2305 mL / NET: 725 mL    Physical Exam:   Constitutional: Appears comfortable, intubated & sedated  HEENT: pupils 2mm equal and reacting to light  Neck: supple, Central Line to RIJ-dressing, clean, dry & intact, ET tube, OG tube  Cardiovascular: Regular rate & rhythm, +S1/S2  Respiratory: Inspiratory & expiratory wheezes, on ventilator, no rales or rhonchi  Gastrointestinal: soft, non-tender, non distended, bowel sounds present x4  Extremities; No edema noted, no cyanosis or no clubbing,  Vascular: bilateral pedal pulse +2, capillary refill < 3 seconds  Neurological: intubated & sedated  Musculoskeletal: unable to assess d/t sedation   Skin: warm, dry, normal skin turgor    Meds:  azithromycin  IVPB   azithromycin  IVPB IV Intermittent  cefTRIAXone   IVPB IV Intermittent    tamsulosin Oral    insulin Infusion IV Continuous  methylPREDNISolone sodium succinate Injectable IV Push    ALBUTerol    90 MICROgram(s) HFA Inhaler Inhalation  ALBUTerol/ipratropium for Nebulization Nebulizer    propofol Infusion IV Continuous      heparin  Injectable SubCutaneous    pantoprazole  Injectable IV Push      sodium chloride 0.9%. IV Continuous      chlorhexidine 0.12% Liquid Swish and Spit               11.9   17.35 )-----------( 264      ( 28 Oct 2018 06:10 )             38.2       10-28    143  |  103  |  22  ----------------------------<  182<H>  4.9   |  34<H>  |  0.97    Ca    9.9      28 Oct 2018 06:10  Phos  2.7     10-28  Mg     2.5     10-28    TPro  6.4  /  Alb  3.0<L>  /  TBili  0.3  /  DBili  x   /  AST  24  /  ALT  72  /  AlkPhos  83  10-28    Lactate 1.8           10-28 @ 06:10    Lactate 1.5           10-27 @ 14:10      CARDIAC MARKERS ( 26 Oct 2018 21:39 )  .155 ng/mL / x     / 454 U/L / x     / 19.2 ng/mL      PT/INR - ( 28 Oct 2018 06:10 )   PT: 10.4 sec;   INR: 0.96 ratio         PTT - ( 28 Oct 2018 06:10 )  PTT:30.1 sec  Urinalysis Basic - ( 26 Oct 2018 14:28 )    Color: Yellow / Appearance: Turbid / S.025 / pH: x  Gluc: x / Ketone: Negative  / Bili: Negative / Urobili: Negative   Blood: x / Protein: 150 mg/dL / Nitrite: Negative   Leuk Esterase: Negative / RBC: 0-2 /HPF / WBC 0-2   Sq Epi: x / Non Sq Epi: Few / Bacteria: Moderate      .Sputum Sputum - trap --   Few polymorphonuclear leukocytes per low power field  Moderate squamous epithelial cells per low power field  Numerous Gram positive cocci in pairs, chains and clusters 10-27 @ 10:23  .Blood Blood   No growth to date. -- 10-26 @ 20:03  .Blood Blood   No growth to date. -- 10-26 @ 20:01  .Urine Catheterized   No growth -- 10-26 @ 19:53      Rapid RVP Result: NotDetec (10-26 @ 10:58)          Radiology: ***< from: CT Head No Cont (10.28.18 @ 09:46) >  XAM:  CT BRAIN                            PROCEDURE DATE:  10/28/2018          INTERPRETATION:  CLINICAL STATEMENT: Altered consciousness    TECHNIQUE: CT of the head was performed without IV contrast.    COMPARISON: None.    FINDINGS:  There is moderate diffuse parenchymal volume loss. There are areas of low   attenuation in the periventricular white matter likely related to mild   chronic microvascular ischemic changes.    There is no acute intracranial hemorrhage, parenchymal mass, or midline   shift. There is no acute territorial infarct. There is no hydrocephalus.    Extra-axial mass measuring 2.2 x 1.4 cm noted in the left parietal region   containing calcifications which may represent meningioma.    The cranium is intact.         Mild mucosal thickening paranasal sinuses.    IMPRESSION:  No acute intracranial hemorrhage or acute territorial infarct.      ALEKS HERBERT M.D., ATTENDING RADIOLOGIST  This document has been electronically signed. Oct 28 2018  9:59AM       < end of copied text >    Bedside ultrasound: ***Scattered B lines, IVC 2.3 with no respiratory variation      CENTRAL LINE:RIJ      DATE INSERTED:  10/26/18            REMOVE: Y/N  SHARMA: Yes                DATE INSERTED: 10/26/18             REMOVE: Y/N  A-LINE: Y (right radial)             DATE INSERTED: 10/26/18             REMOVE: Y/N    GLOBAL ISSUE/BEST PRACTICE:  Analgesia: n/a  Sedation propofol drip  CAM-ICU: n/a  HOB elevation: yes  Stress ulcer prophylaxis: Pantoprazole 40mg IVP twice daily  VTE prophylaxis: he eryn 5000 units SQ every 8 hours  Glycemic control: blood glucose monitoring every 6 hours, insulin coverage  Nutrition: feedings via OG tube    CODE STATUS: ***Full Code 24 hour events:   ventilation improving over the course of the day yesterday  ketamine gtt weaned off  improving acidemia    Review of Systems: LINA patient intubated & sedated    T(F): 97 (10-28-18 @ 07:35), Max: 97.3 (10-27-18 @ 19:00)  HR: 65 (10-28-18 @ 07:53) (65 - 105)  BP: 116/71 (10-28-18 @ 06:00) (97/57 - 152/87)  RR: 17 (10-28-18 @ 06:30) (12 - 34)  SpO2: 100% (10-28-18 @ 07:53) (74% - 100%)  Wt(kg): --    Mode: AC/ CMV (Assist Control/ Continuous Mandatory Ventilation), RR (machine): 12, TV (machine): 500, FiO2: 30, PEEP: 5  10-27-18 @ 07:01  -  10-28-18 @ 07:00  --------------------------------------------------------  IN: 3030 mL / OUT: 2305 mL / NET: 725 mL      CAPILLARY BLOOD GLUCOSE    POCT Blood Glucose.: 190 mg/dL (28 Oct 2018 08:08)    I&O's Summary    27 Oct 2018 07:01  -  28 Oct 2018 07:00  --------------------------------------------------------  IN: 3030 mL / OUT: 2305 mL / NET: 725 mL    Physical Exam:   Constitutional: Appears comfortable, intubated & sedated  HEENT: pupils 2mm equal and reacting to light  Neck: supple, Central Line to RIJ-dressing, clean, dry & intact, ET tube, OG tube  Cardiovascular: Regular rate & rhythm, +S1/S2  Respiratory: Inspiratory & expiratory wheezes, on ventilator, no rales or rhonchi  Gastrointestinal: soft, non-tender, non distended, bowel sounds present x4  Extremities; No edema noted, no cyanosis or no clubbing,  Vascular: bilateral pedal pulse +2, capillary refill < 3 seconds  Neurological: intubated & sedated  Musculoskeletal: unable to assess d/t sedation   Skin: warm, dry, normal skin turgor    Meds:  azithromycin  IVPB   azithromycin  IVPB IV Intermittent  cefTRIAXone   IVPB IV Intermittent    tamsulosin Oral    insulin Infusion IV Continuous  methylPREDNISolone sodium succinate Injectable IV Push    ALBUTerol    90 MICROgram(s) HFA Inhaler Inhalation  ALBUTerol/ipratropium for Nebulization Nebulizer    propofol Infusion IV Continuous      heparin  Injectable SubCutaneous    pantoprazole  Injectable IV Push      sodium chloride 0.9%. IV Continuous      chlorhexidine 0.12% Liquid Swish and Spit               11.9   17.35 )-----------( 264      ( 28 Oct 2018 06:10 )             38.2       10-28    143  |  103  |  22  ----------------------------<  182<H>  4.9   |  34<H>  |  0.97    Ca    9.9      28 Oct 2018 06:10  Phos  2.7     10-28  Mg     2.5     10-28    TPro  6.4  /  Alb  3.0<L>  /  TBili  0.3  /  DBili  x   /  AST  24  /  ALT  72  /  AlkPhos  83  10-28    Lactate 1.8           10-28 @ 06:10    Lactate 1.5           10-27 @ 14:10      CARDIAC MARKERS ( 26 Oct 2018 21:39 )  .155 ng/mL / x     / 454 U/L / x     / 19.2 ng/mL      PT/INR - ( 28 Oct 2018 06:10 )   PT: 10.4 sec;   INR: 0.96 ratio         PTT - ( 28 Oct 2018 06:10 )  PTT:30.1 sec  Urinalysis Basic - ( 26 Oct 2018 14:28 )    Color: Yellow / Appearance: Turbid / S.025 / pH: x  Gluc: x / Ketone: Negative  / Bili: Negative / Urobili: Negative   Blood: x / Protein: 150 mg/dL / Nitrite: Negative   Leuk Esterase: Negative / RBC: 0-2 /HPF / WBC 0-2   Sq Epi: x / Non Sq Epi: Few / Bacteria: Moderate      .Sputum Sputum - trap --   Few polymorphonuclear leukocytes per low power field  Moderate squamous epithelial cells per low power field  Numerous Gram positive cocci in pairs, chains and clusters 10-27 @ 10:23    .Blood Blood   No growth to date. -- 10-26 @ 20:03    .Blood Blood   No growth to date. -- 10-26 @ 20:01    .Urine Catheterized   No growth -- 10-26 @ 19:53      Rapid RVP Result: NotDetec (10-26 @ 10:58)          Radiology: ***< from: CT Head No Cont (10.28.18 @ 09:46) >  XAM:  CT BRAIN                            PROCEDURE DATE:  10/28/2018          INTERPRETATION:  CLINICAL STATEMENT: Altered consciousness    TECHNIQUE: CT of the head was performed without IV contrast.    COMPARISON: None.    FINDINGS:  There is moderate diffuse parenchymal volume loss. There are areas of low   attenuation in the periventricular white matter likely related to mild   chronic microvascular ischemic changes.    There is no acute intracranial hemorrhage, parenchymal mass, or midline   shift. There is no acute territorial infarct. There is no hydrocephalus.    Extra-axial mass measuring 2.2 x 1.4 cm noted in the left parietal region   containing calcifications which may represent meningioma.    The cranium is intact.         Mild mucosal thickening paranasal sinuses.    IMPRESSION:  No acute intracranial hemorrhage or acute territorial infarct.      ALEKS HERBERT M.D., ATTENDING RADIOLOGIST  This document has been electronically signed. Oct 28 2018  9:59AM       < end of copied text >    Bedside ultrasound: ***Scattered B lines, IVC 2.3 with no respiratory variation      CENTRAL LINE:RIJ      DATE INSERTED:  10/26/18            REMOVE: Y/N  SHARMA: Yes                DATE INSERTED: 10/26/18             REMOVE: Y/N  A-LINE: Y (right radial)             DATE INSERTED: 10/26/18             REMOVE: Y/N    GLOBAL ISSUE/BEST PRACTICE:  Analgesia: n/a  Sedation propofol drip  CAM-ICU: LINA  HOB elevation: yes  Stress ulcer prophylaxis: Pantoprazole 40mg IVP twice daily  VTE prophylaxis: he eryn 5000 units SQ every 8 hours  Glycemic control: blood glucose monitoring every 6 hours, insulin coverage  Nutrition: feedings via OG tube    CODE STATUS: ***Full Code

## 2018-10-28 NOTE — PROGRESS NOTE ADULT - ASSESSMENT
78 year old male with acute hypoxic hypercapnic respiratory failure secondary to status asthmaticus then developed PEA cardiac arrest, MERRILL 78 year old male with acute hypoxic hypercapnic respiratory failure secondary to status asthmaticus then developed PEA cardiac arrest, MERRILL     Critical Care time: 35 mins assessing presenting problems of acute illness that poses high probability of life threatening deterioration or end organ damage/dysfunction.  Medical decision making inculding Initiating plan of care, reviewing data, reviewing radiology,direct patient bedside evaluation and interpretation of vital signs, any necessary ventilator management , discusion with multidisciplinary team, discussing goals of care with patient/family, all non inclusive of procedures

## 2018-10-28 NOTE — PROGRESS NOTE ADULT - SUBJECTIVE AND OBJECTIVE BOX
Neurology Follow up note    YUE [id number]    HPI:      Interval History - doing better.    Patient is seen, chart was reviewed and case was discussed with the treatment team.  Pt is not in any distress.   Lying on bed comfortably.   No events reported overnight.   No clinical seizure was reported.  Sitting on chair bed comfortably.    is at bedside.    Vital Signs Last 24 Hrs  T(C): 35.9 (28 Oct 2018 11:27), Max: 36.3 (27 Oct 2018 19:00)  T(F): 96.6 (28 Oct 2018 11:27), Max: 97.3 (27 Oct 2018 19:00)  HR: 78 (28 Oct 2018 12:00) (65 - 105)  BP: 166/84 (28 Oct 2018 09:00) (97/57 - 166/84)  BP(mean): 115 (28 Oct 2018 09:00) (71 - 116)  RR: 14 (28 Oct 2018 12:00) (12 - 34)  SpO2: 100% (28 Oct 2018 12:00) (74% - 100%)          On Neurological Examination:    Mental Status - Pt is responding to verbal commands by squeezing hands and raising leg.    Speech -  seems to comprehend .    Cranial Nerves - Pupils 3 mm equal and reactive to light, extraocular eye movements intact,.  Pt has no  facial asymmetry.     Muscle tone - is normal      Motor Exam - 3/5 grossly    Sensory Exam; Pt withdraws all extremities equally on stimulation.       Deep tendon Reflexes - 2 plus all over.         Neck Supple -  Yes.     MEDICATIONS    ALBUTerol    90 MICROgram(s) HFA Inhaler 4 Puff(s) Inhalation every 2 hours  ALBUTerol/ipratropium for Nebulization 3 milliLiter(s) Nebulizer every 4 hours  aspirin  chewable 81 milliGRAM(s) Oral daily  atorvastatin 40 milliGRAM(s) Oral at bedtime  cefTRIAXone   IVPB 1 Gram(s) IV Intermittent every 24 hours  chlorhexidine 0.12% Liquid 15 milliLiter(s) Swish and Spit two times a day  clopidogrel Tablet 75 milliGRAM(s) Oral daily  heparin  Injectable 5000 Unit(s) SubCutaneous every 8 hours  hydrALAZINE 25 milliGRAM(s) Oral every 8 hours  insulin glargine Injectable (LANTUS) 6 Unit(s) SubCutaneous at bedtime  insulin lispro (HumaLOG) corrective regimen sliding scale   SubCutaneous every 6 hours  methylPREDNISolone sodium succinate Injectable 40 milliGRAM(s) IV Push every 12 hours  pantoprazole  Injectable 40 milliGRAM(s) IV Push daily  propofol Infusion 10 MICROgram(s)/kG/Min IV Continuous <Continuous>  tamsulosin 0.4 milliGRAM(s) Oral at bedtime      Allergies    No Known Allergies    Intolerances    shellfish (Nausea)      LABS:  CBC Full  -  ( 28 Oct 2018 06:10 )  WBC Count : 17.35 K/uL  Hemoglobin : 11.9 g/dL  Hematocrit : 38.2 %  Platelet Count - Automated : 264 K/uL  Mean Cell Volume : 89.0 fl  Mean Cell Hemoglobin : 27.7 pg  Mean Cell Hemoglobin Concentration : 31.2 gm/dL  Auto Neutrophil # : 15.74 K/uL  Auto Lymphocyte # : 0.47 K/uL  Auto Monocyte # : 1.00 K/uL  Auto Eosinophil # : 0.00 K/uL  Auto Basophil # : 0.01 K/uL  Auto Neutrophil % : 90.7 %  Auto Lymphocyte % : 2.7 %  Auto Monocyte % : 5.8 %  Auto Eosinophil % : 0.0 %  Auto Basophil % : 0.1 %    Urinalysis Basic - ( 26 Oct 2018 14:28 )    Color: Yellow / Appearance: Turbid / S.025 / pH: x  Gluc: x / Ketone: Negative  / Bili: Negative / Urobili: Negative   Blood: x / Protein: 150 mg/dL / Nitrite: Negative   Leuk Esterase: Negative / RBC: 0-2 /HPF / WBC 0-2   Sq Epi: x / Non Sq Epi: Few / Bacteria: Moderate      10-28    143  |  103  |  22  ----------------------------<  182<H>  4.9   |  34<H>  |  0.97    Ca    9.9      28 Oct 2018 06:10  Phos  2.7     10-28  Mg     2.5     10-28    TPro  6.4  /  Alb  3.0<L>  /  TBili  0.3  /  DBili  x   /  AST  24  /  ALT  72  /  AlkPhos  83  10-28    Hemoglobin A1C:     Vitamin B12     RADIOLOGY  < from: CT Head No Cont (10.28.18 @ 09:46) >    EXAM:  CT BRAIN                            PROCEDURE DATE:  10/28/2018          INTERPRETATION:  CLINICAL STATEMENT: Altered consciousness    TECHNIQUE: CT of the head was performed without IV contrast.    COMPARISON: None.    FINDINGS:  There is moderate diffuse parenchymal volume loss. There are areas of low   attenuation in the periventricular white matter likely related to mild   chronic microvascular ischemic changes.    There is no acute intracranial hemorrhage, parenchymal mass, or midline   shift. There is no acute territorial infarct. There is no hydrocephalus.    Extra-axial mass measuring 2.2 x 1.4 cm noted in the left parietal region   containing calcifications which may represent meningioma.    The cranium is intact.         Mild mucosal thickening paranasal sinuses.    IMPRESSION:  No acute intracranial hemorrhage or acute territorial infarct.                ALEKS HERBERT M.D., ATTENDING RADIOLOGIST  This document has been electronically signed. Oct 28 2018  9:59AM                <     ASSESSMENT AND PLAN:      hypoxic encephalopathy improving mentation.  sp cardiac arrest on hypothermia protocol.  respiratory failure on vent.    ct head reported no cva/edema.  would maintain adequate bp/oxygenation.  Plan of care was discussed with family. Questions answered.  Would continue to follow.

## 2018-10-28 NOTE — CONSULT NOTE ADULT - ASSESSMENT
[facility]    ALLERGY Shellfish  CONTACT Sp Amira     REASON FOR VISIT   Initial evaluation/Pulmonary consultation requested  10/28/2018  from Dr Dejesus   Patient examined chart reviewed  HOSPITAL ADMISSION [facility] Dr Melba Sidhu 10/26/2018   PATIENT CAME  FROM (if information available)      VITALS/LABS                           10/28/2018 afeb 109 140/70 20 100%   10/28/2018 Insulin drip Dced 10/28/2018   10/28/2018 DEX   10/28/2018 7a PROP 50 M/K/M   10/28/2018 u 1845 last s   10/28/2018 AC 12/500/5/.3   10/28/2018 W 17.3 Hb 11.9 Plt 264 INR .9 Na 143 K 4.9 CO2 34 Cr .9   10/28/2018 acc [phone]   10/28/2018 ac 12/.500/.3/5  10/28/2018 CT H Post ttt No infacrt or hrrge     REVIEW OF SYMPTOMS    Able to give ROS  NO     PHYSICAL EXAM    HEENT Unremarkable PERRLA atraumatic   RESP Fair air entry EXP prolonged    Harsh breath sound Resp distres mild   CARDIAC S1 S2 No S3     NO JVD    ABDOMEN SOFT BS PRESENT NOT DISTENDED No hepatosplenomegaly PEDAL EDEMA present No calf tenderness  NO rash   GENERAL Not TOXIC looking    GLOBAL ISSUE/BEST PRACTICE:      PROBLEM: HOB elevation:   y            PROBLEM: Stress ulcer proph:    protonix 40 (10/26)                       PROBLEM: VTE prophylaxis:      HSC (10/26)  PROBLEM: PNEUMONIA PPX Chlorhexidine .12%    PROBLEM: Glycemic control:    na  PROBLEM: Nutrition:    pulmocare 1680 (10/27)   PROBLEM: Advanced directive: na     PROBLEM: Allergies:  shellfish  PROCEDURE 10/28/2018 Gonzáles (u retn)   PROCEDURE 10/26/2018 A line   PROCEDURE 10/26/2018 C line RIJ  PROCEDURE 10/26/2018 G tube   PEROCEDURE 10/26/2018 ET intubation    PATIENT DESCRIPTION 10/26/2018  ADMISSION [facility] DR DU SIDHU  77 y/o male pmhx HTN, PVD, DM, Asthma, COPD on 2L home O2, hx CABGx3, HLD presented to ED w/ SOB and increased work of breathing. Pt admitted w/ acute on chronic hypercapnic respiratory failure due to status asthmaticus and hyperglycemia. Subsequently suffered a cardiac arrest (radycardic/PEA arrest. ) in ICU, ICU course was marked with anoxic encephalopathy s/p cardiac arrest  targeted temperature management goal 36C  required heavy sedation due to respiratory status, sedated with propofol and ketamine  ICU course marked  w/ MERRILL, lactic acidosis, and hyperkalemia.  Pulm consulted 10/28/2018     PATIENT MANAGEMENT   10/26/2018  ADMISSION [facility] P DR DU SIDHU    CARDIAC ARREST 10/26/2018 LIKELY SEC TO RESP FAILURE   Hemodynamics remained stable Possible anoxic encephaslopathy   TARGETED TEMPERATURE THERAPY POST CAC 10/26/2018 10/28 CT H n   INTUBATION MECHANICAL VENT 10/26/2018  IV SEDATIVE DRIP FOR MECHANICAL VENTILATION  Propofol changed to DEX 10/28/2018 for weaning   COPD Managed with BD steroids    HYPERGLYCEMIA REQUIRED INSULIN DRIO 10/26-10/28/2018   MERRILL NONOLIGURIC 10/26/2018 Resolved  with supp care     PROBLEM/ASSESSMENT/PLAN    CAD  10/26/2018 ECHO ef 55%   ASA 81 (10/28)   Plavix 75 (10/28)   Atorvastat 40 (10/28)   ASSESSMENT/RECOMMENDATIONS    PROBLEM/ASSESSMENT/PLAN    HYPERTENSION  Hydralazine 25.3  ASSESSMENT/RECOMMENDATIONS        PROBLEM/ASSESSMENT/PLAN    RESP TRACT INFECTION   10/26-10/27-10/28 W 15.3-18.7-17.3   MICROBIO   10/27 Sp c N fl   10/26 bc n   10/26  u c n   10/26 Leg n   10/26 RVP n   ABIO   Rocephin (10/26)   ASSESSMENT/RECOMMENDATIONS  Cont Rx      PROBLEM/ASSESSMENT/PLAN    ACUTE COMBINED O2 CO2  RESP FAILURE SEC COPD EX   10/28/2018 AC 12/500/5/.3   10/28/2018 737/56/100  ASSESSMENT/RECOMMENDATIONS  Wean as tolerated     PROBLEM/ASSESSMENT/PLAN      ASSESSMENT/RECOMMENDATIONS        PROBLEM/ASSESSMENT/PLAN    VENT SEDATION  Dexmedetomidine 200 in 50 ns .3 m/k/h (10/28)   Propofol 1000 in 100 10 m/k/m (10/26)  ASSESSMENT/RECOMMENDATIONS  Target RASS 0- Neg 1     PROBLEM/ASSESSMENT/PLAN    COPD EX   Solumed 40.2 (10/28)   Duoneb.6 (10/27)   ASSESSMENT/RECOMMENDATIONS  Consider adding inhaled cs        PROBLEM/ASSESSMENT/PLAN    DM  Lantus 6 (10/28)   iss (10/28)   ASSESSMENT/RECOMMENDATIONS  Monitor acc    PROBLEM/ASSESSMENT/PLAN    BPH   Tamsulosin .4 (10/27)   ASSESSMENT/RECOMMENDATIONS  Cont Rx         TIME SPENT Over 55 minutes aggregate care time spent on encounter; activities included   direct patient care, counseling and/or coordinating care reviewing notes, lab data/ imaging , discussion with multidisciplinary team/ patient  /family. Risks, benefits, alternatives  discussed in detail.
Mr. Donohue is a 79 yo M with asthma, COPD, chronic hypoxemic respiratory failure, CAD with CABG in 2004 presents with worsening respiratory distress over a few days.  Found to have severe hypercapnic respiratory failure, initially improved on BiPAP.  Transferred to ICU and had bradycardic/PEA arrest.   Etiology of his arrest presumably respiratory in etiology  Has not had recent cardiac issues and reports a normal stress test in 8/2018.   LV function normal as of 6/2018    - Remains intubated and sedated. Vent management per ICU  - Enzyme leak noted though presumably in setting of arrest. Continue to trend  - EKG is Sinus tachycardia without obvious ischemia  - No sign of volume overload  - No arrythmia on telemetry  - Repeat echocardiogram to evaluate changes in LV function  - Can restart anti-platelets if no contraindication  - Eventually restart on statin  - BP medications temporarily held  - Watch creatinine and electrolytes  - Will follow with you    Critically ill. High risk of decompensation. >35 min spent taking care of patient. Discussed with wife at bedside.
79 y/o male pmhx HTN, PVD, DM, Asthma, COPD on 2L home O2, hx CABGx3, HLD presented to ED w/ SOB and increased work of breathing. Pt admitted w/ acute on chronic hypercapnic respiratory failure due to status asthmaticus and hyperglycemia. Subsequently suffered a cardiac arrestin ICU, Now w/ MERRILL, lactic acidosis, and hyperkalemia.     Neuro:  CT head when stable. Hypothermia Protocol initiated aim for temp < 36 C. Sedated on propofol for ventilator compliance  CV: Cardiac arrest, likely related to hypercapnia. Hypothermia protocol. Trend Cardiac enzymes. Serial EKG's. Official TTE. Hold antihypertensives for now. Will restart ASA/ plavix after head CT.   Resp: Acute hypoxic respiratory failure, related to status asthmaticus. c/w AC/VC, titrate FiO2 to maintain O2 sat > 92%. Will place arterial line for serial ABG's. Low respiratory rate to allow full exhalation and avoid autopeep. Watch for autopeep on ventilator.  Given 2mg mag for additional assistance in bronchodilation. Started on ketamine infusion for bronchodilation as well.   Duoneb q6hr. Solumedrol 125 q8hr . Atrovent 4 puffs q6hr.   GI: NPO. Stress ulcer ppx w/ protonix  Renal MERRILL, likely from hypoperfusion during arrest. IVF hydration. Trend BUN/Crt. Avoid nephrotoxic agents  - Lactic acidosis, due to cardiac arrest. s/p IVF. repeat until cleared   Endo: Hyperglycemia, s/p 10 units insulin. BG remains >450. Started on Insulin infusion, accu checks q1hr. Follow per hyperglycemia protocol   ID: ? Sepsis, Started on ceftriaxone and Azithromycin f/u blood and urine cultures. RVP negative. send sputum culture. send urine legionella.

## 2018-10-28 NOTE — PROGRESS NOTE ADULT - SUBJECTIVE AND OBJECTIVE BOX
CHIEF COMPLAINT/INTERVAL HISTORY:  Pt. seen and evaluated for s/p PEA cardiac arrest and acute on chronic respiratory failure.  Pt. is currently on ventilator support.  Able to open his eyes.  Tolerating IV antibiotic and steroids.      REVIEW OF SYSTEMS:  No fever.  Rest of ROS not obtained as patient on vent.      Vital Signs Last 24 Hrs  T(C): 35.9 (28 Oct 2018 11:27), Max: 36.3 (27 Oct 2018 19:00)  T(F): 96.6 (28 Oct 2018 11:27), Max: 97.3 (27 Oct 2018 19:00)  HR: 78 (28 Oct 2018 12:00) (65 - 105)  BP: 166/84 (28 Oct 2018 09:00) (97/57 - 166/84)  BP(mean): 115 (28 Oct 2018 09:00) (71 - 116)  RR: 14 (28 Oct 2018 12:00) (12 - 34)  SpO2: 100% (28 Oct 2018 12:00) (74% - 100%)    PHYSICAL EXAM:  GENERAL: NAD, intubated  HEENT: conjunctiva and sclera clear, OGT, ETT  Chest: Decreased BS bialterally, no wheezing  CV: S1S2, RRR,   GI: soft, +BS, NT/ND  Musculoskeletal: no edema  Psychiatric: in no distress, intubated  Skin: warm and dry    LABS:                        11.9   17.35 )-----------( 264      ( 28 Oct 2018 06:10 )             38.2     10-    143  |  103  |  22  ----------------------------<  182<H>  4.9   |  34<H>  |  0.97    Ca    9.9      28 Oct 2018 06:10  Phos  2.7     10-  Mg     2.5     10-    TPro  6.4  /  Alb  3.0<L>  /  TBili  0.3  /  DBili  x   /  AST  24  /  ALT  72  /  AlkPhos  83  10-    PT/INR - ( 28 Oct 2018 06:10 )   PT: 10.4 sec;   INR: 0.96 ratio         PTT - ( 28 Oct 2018 06:10 )  PTT:30.1 sec  Urinalysis Basic - ( 26 Oct 2018 14:28 )    Color: Yellow / Appearance: Turbid / S.025 / pH: x  Gluc: x / Ketone: Negative  / Bili: Negative / Urobili: Negative   Blood: x / Protein: 150 mg/dL / Nitrite: Negative   Leuk Esterase: Negative / RBC: 0-2 /HPF / WBC 0-2   Sq Epi: x / Non Sq Epi: Few / Bacteria: Moderate        Assessment and Plan:  -S/p PEA cardiac arrest:  2/2 hypercapnic respiratory failure.  On hypothermia protocol.  Ventilator support.  Intensivist and Cardiology f/u  -Acute on chronic hypoxic and hypercapnic respiratory failure 2/2 status asthmaticus/COPD exacerbation:  continue Ceftriaxone 1gm IV daily, solu-medrol 40mg IV Q12h, and duoneb tx Q4h.  Ventilator support.    -MERRILL:  resolved with hydration.  -Type 2 DM:  continue Lantus 6 units SQ QHS and humalog sliding scale  -CAD:  continue ASA, Plavix, and Lipitor.  -HTN:  continue hydralazinie 25mg via OGT Q8h  -BPH:  continue Flomax 0.4mg via OGT QHS  -Ppx: Protonix 40mg IV daily and Heparin 5000 units sQ Q8h.

## 2018-10-29 DIAGNOSIS — J44.9 CHRONIC OBSTRUCTIVE PULMONARY DISEASE, UNSPECIFIED: ICD-10-CM

## 2018-10-29 DIAGNOSIS — I10 ESSENTIAL (PRIMARY) HYPERTENSION: ICD-10-CM

## 2018-10-29 DIAGNOSIS — N40.0 BENIGN PROSTATIC HYPERPLASIA WITHOUT LOWER URINARY TRACT SYMPTOMS: ICD-10-CM

## 2018-10-29 DIAGNOSIS — E11.9 TYPE 2 DIABETES MELLITUS WITHOUT COMPLICATIONS: ICD-10-CM

## 2018-10-29 DIAGNOSIS — I73.9 PERIPHERAL VASCULAR DISEASE, UNSPECIFIED: ICD-10-CM

## 2018-10-29 DIAGNOSIS — I25.10 ATHEROSCLEROTIC HEART DISEASE OF NATIVE CORONARY ARTERY WITHOUT ANGINA PECTORIS: ICD-10-CM

## 2018-10-29 DIAGNOSIS — N17.9 ACUTE KIDNEY FAILURE, UNSPECIFIED: ICD-10-CM

## 2018-10-29 DIAGNOSIS — J45.901 UNSPECIFIED ASTHMA WITH (ACUTE) EXACERBATION: ICD-10-CM

## 2018-10-29 DIAGNOSIS — J96.02 ACUTE RESPIRATORY FAILURE WITH HYPERCAPNIA: ICD-10-CM

## 2018-10-29 DIAGNOSIS — Z29.9 ENCOUNTER FOR PROPHYLACTIC MEASURES, UNSPECIFIED: ICD-10-CM

## 2018-10-29 DIAGNOSIS — I46.9 CARDIAC ARREST, CAUSE UNSPECIFIED: ICD-10-CM

## 2018-10-29 LAB
ALBUMIN SERPL ELPH-MCNC: 2.6 G/DL — LOW (ref 3.3–5)
ALP SERPL-CCNC: 76 U/L — SIGNIFICANT CHANGE UP (ref 40–120)
ALT FLD-CCNC: 57 U/L — SIGNIFICANT CHANGE UP (ref 12–78)
ANION GAP SERPL CALC-SCNC: 2 MMOL/L — LOW (ref 5–17)
APTT BLD: 31.4 SEC — SIGNIFICANT CHANGE UP (ref 27.5–37.4)
AST SERPL-CCNC: 16 U/L — SIGNIFICANT CHANGE UP (ref 15–37)
BASE EXCESS BLDA CALC-SCNC: 7.6 MMOL/L — HIGH (ref -2–2)
BASE EXCESS BLDA CALC-SCNC: 9.5 MMOL/L — HIGH (ref -2–2)
BASOPHILS # BLD AUTO: 0.01 K/UL — SIGNIFICANT CHANGE UP (ref 0–0.2)
BASOPHILS NFR BLD AUTO: 0.1 % — SIGNIFICANT CHANGE UP (ref 0–2)
BILIRUB SERPL-MCNC: 0.3 MG/DL — SIGNIFICANT CHANGE UP (ref 0.2–1.2)
BLOOD GAS COMMENTS ARTERIAL: SIGNIFICANT CHANGE UP
BUN SERPL-MCNC: 25 MG/DL — HIGH (ref 7–23)
CALCIUM SERPL-MCNC: 8.9 MG/DL — SIGNIFICANT CHANGE UP (ref 8.5–10.1)
CHLORIDE SERPL-SCNC: 106 MMOL/L — SIGNIFICANT CHANGE UP (ref 96–108)
CO2 SERPL-SCNC: 36 MMOL/L — HIGH (ref 22–31)
CREAT SERPL-MCNC: 1.2 MG/DL — SIGNIFICANT CHANGE UP (ref 0.5–1.3)
CULTURE RESULTS: SIGNIFICANT CHANGE UP
EOSINOPHIL # BLD AUTO: 0.01 K/UL — SIGNIFICANT CHANGE UP (ref 0–0.5)
EOSINOPHIL NFR BLD AUTO: 0.1 % — SIGNIFICANT CHANGE UP (ref 0–6)
GLUCOSE SERPL-MCNC: 171 MG/DL — HIGH (ref 70–99)
HCO3 BLDA-SCNC: 31 MMOL/L — HIGH (ref 23–27)
HCO3 BLDA-SCNC: 33 MMOL/L — HIGH (ref 23–27)
HCT VFR BLD CALC: 36.4 % — LOW (ref 39–50)
HGB BLD-MCNC: 11.3 G/DL — LOW (ref 13–17)
HOROWITZ INDEX BLDA+IHG-RTO: 30 — SIGNIFICANT CHANGE UP
HOROWITZ INDEX BLDA+IHG-RTO: 30 — SIGNIFICANT CHANGE UP
IMM GRANULOCYTES NFR BLD AUTO: 0.6 % — SIGNIFICANT CHANGE UP (ref 0–1.5)
INR BLD: 1.01 RATIO — SIGNIFICANT CHANGE UP (ref 0.88–1.16)
LYMPHOCYTES # BLD AUTO: 1.01 K/UL — SIGNIFICANT CHANGE UP (ref 1–3.3)
LYMPHOCYTES # BLD AUTO: 9.6 % — LOW (ref 13–44)
MAGNESIUM SERPL-MCNC: 2.2 MG/DL — SIGNIFICANT CHANGE UP (ref 1.6–2.6)
MCHC RBC-ENTMCNC: 27.4 PG — SIGNIFICANT CHANGE UP (ref 27–34)
MCHC RBC-ENTMCNC: 31 GM/DL — LOW (ref 32–36)
MCV RBC AUTO: 88.1 FL — SIGNIFICANT CHANGE UP (ref 80–100)
MONOCYTES # BLD AUTO: 0.94 K/UL — HIGH (ref 0–0.9)
MONOCYTES NFR BLD AUTO: 8.9 % — SIGNIFICANT CHANGE UP (ref 2–14)
NEUTROPHILS # BLD AUTO: 8.5 K/UL — HIGH (ref 1.8–7.4)
NEUTROPHILS NFR BLD AUTO: 80.7 % — HIGH (ref 43–77)
PCO2 BLDA: 49 MMHG — HIGH (ref 32–46)
PCO2 BLDA: 49 MMHG — HIGH (ref 32–46)
PH BLDA: 7.43 — SIGNIFICANT CHANGE UP (ref 7.35–7.45)
PH BLDA: 7.45 — SIGNIFICANT CHANGE UP (ref 7.35–7.45)
PHOSPHATE SERPL-MCNC: 2.7 MG/DL — SIGNIFICANT CHANGE UP (ref 2.5–4.5)
PLATELET # BLD AUTO: 235 K/UL — SIGNIFICANT CHANGE UP (ref 150–400)
PO2 BLDA: 101 MMHG — SIGNIFICANT CHANGE UP (ref 74–108)
PO2 BLDA: 87 MMHG — SIGNIFICANT CHANGE UP (ref 74–108)
POTASSIUM SERPL-MCNC: 4.6 MMOL/L — SIGNIFICANT CHANGE UP (ref 3.5–5.3)
POTASSIUM SERPL-SCNC: 4.6 MMOL/L — SIGNIFICANT CHANGE UP (ref 3.5–5.3)
PROT SERPL-MCNC: 5.9 G/DL — LOW (ref 6–8.3)
PROTHROM AB SERPL-ACNC: 11 SEC — SIGNIFICANT CHANGE UP (ref 9.8–12.7)
RBC # BLD: 4.13 M/UL — LOW (ref 4.2–5.8)
RBC # FLD: 13.5 % — SIGNIFICANT CHANGE UP (ref 10.3–14.5)
SAO2 % BLDA: 97 % — HIGH (ref 92–96)
SAO2 % BLDA: 98 % — HIGH (ref 92–96)
SODIUM SERPL-SCNC: 144 MMOL/L — SIGNIFICANT CHANGE UP (ref 135–145)
SPECIMEN SOURCE: SIGNIFICANT CHANGE UP
WBC # BLD: 10.53 K/UL — HIGH (ref 3.8–10.5)
WBC # FLD AUTO: 10.53 K/UL — HIGH (ref 3.8–10.5)

## 2018-10-29 PROCEDURE — 99233 SBSQ HOSP IP/OBS HIGH 50: CPT

## 2018-10-29 PROCEDURE — 99291 CRITICAL CARE FIRST HOUR: CPT

## 2018-10-29 RX ORDER — METOPROLOL TARTRATE 50 MG
25 TABLET ORAL EVERY 12 HOURS
Qty: 0 | Refills: 0 | Status: DISCONTINUED | OUTPATIENT
Start: 2018-10-29 | End: 2018-10-30

## 2018-10-29 RX ORDER — METOPROLOL TARTRATE 50 MG
5 TABLET ORAL ONCE
Qty: 0 | Refills: 0 | Status: COMPLETED | OUTPATIENT
Start: 2018-10-29 | End: 2018-10-29

## 2018-10-29 RX ORDER — BUDESONIDE AND FORMOTEROL FUMARATE DIHYDRATE 160; 4.5 UG/1; UG/1
2 AEROSOL RESPIRATORY (INHALATION)
Qty: 0 | Refills: 0 | Status: DISCONTINUED | OUTPATIENT
Start: 2018-10-29 | End: 2018-11-02

## 2018-10-29 RX ORDER — TIOTROPIUM BROMIDE 18 UG/1
1 CAPSULE ORAL; RESPIRATORY (INHALATION) DAILY
Qty: 0 | Refills: 0 | Status: DISCONTINUED | OUTPATIENT
Start: 2018-10-29 | End: 2018-11-02

## 2018-10-29 RX ADMIN — Medication 3 MILLILITER(S): at 03:39

## 2018-10-29 RX ADMIN — HEPARIN SODIUM 5000 UNIT(S): 5000 INJECTION INTRAVENOUS; SUBCUTANEOUS at 05:15

## 2018-10-29 RX ADMIN — PANTOPRAZOLE SODIUM 40 MILLIGRAM(S): 20 TABLET, DELAYED RELEASE ORAL at 12:15

## 2018-10-29 RX ADMIN — Medication 40 MILLIGRAM(S): at 05:15

## 2018-10-29 RX ADMIN — Medication 2: at 05:27

## 2018-10-29 RX ADMIN — ATORVASTATIN CALCIUM 40 MILLIGRAM(S): 80 TABLET, FILM COATED ORAL at 22:28

## 2018-10-29 RX ADMIN — Medication 25 MILLIGRAM(S): at 05:16

## 2018-10-29 RX ADMIN — Medication 25 MILLIGRAM(S): at 22:28

## 2018-10-29 RX ADMIN — CLOPIDOGREL BISULFATE 75 MILLIGRAM(S): 75 TABLET, FILM COATED ORAL at 12:16

## 2018-10-29 RX ADMIN — DEXMEDETOMIDINE HYDROCHLORIDE IN 0.9% SODIUM CHLORIDE 7.35 MICROGRAM(S)/KG/HR: 4 INJECTION INTRAVENOUS at 06:32

## 2018-10-29 RX ADMIN — Medication 25 MILLIGRAM(S): at 18:55

## 2018-10-29 RX ADMIN — CHLORHEXIDINE GLUCONATE 15 MILLILITER(S): 213 SOLUTION TOPICAL at 05:16

## 2018-10-29 RX ADMIN — BUDESONIDE AND FORMOTEROL FUMARATE DIHYDRATE 2 PUFF(S): 160; 4.5 AEROSOL RESPIRATORY (INHALATION) at 18:32

## 2018-10-29 RX ADMIN — Medication 8: at 12:15

## 2018-10-29 RX ADMIN — Medication 5 MILLIGRAM(S): at 18:55

## 2018-10-29 RX ADMIN — Medication 81 MILLIGRAM(S): at 12:15

## 2018-10-29 RX ADMIN — Medication 3 MILLILITER(S): at 11:28

## 2018-10-29 RX ADMIN — HEPARIN SODIUM 5000 UNIT(S): 5000 INJECTION INTRAVENOUS; SUBCUTANEOUS at 22:27

## 2018-10-29 RX ADMIN — TAMSULOSIN HYDROCHLORIDE 0.4 MILLIGRAM(S): 0.4 CAPSULE ORAL at 22:28

## 2018-10-29 RX ADMIN — Medication 3 MILLILITER(S): at 00:16

## 2018-10-29 RX ADMIN — Medication 40 MILLIGRAM(S): at 14:32

## 2018-10-29 RX ADMIN — Medication 4: at 18:31

## 2018-10-29 RX ADMIN — Medication 25 MILLIGRAM(S): at 14:32

## 2018-10-29 RX ADMIN — CEFTRIAXONE 100 GRAM(S): 500 INJECTION, POWDER, FOR SOLUTION INTRAMUSCULAR; INTRAVENOUS at 18:28

## 2018-10-29 RX ADMIN — CHLORHEXIDINE GLUCONATE 15 MILLILITER(S): 213 SOLUTION TOPICAL at 18:30

## 2018-10-29 RX ADMIN — HEPARIN SODIUM 5000 UNIT(S): 5000 INJECTION INTRAVENOUS; SUBCUTANEOUS at 14:32

## 2018-10-29 RX ADMIN — INSULIN GLARGINE 6 UNIT(S): 100 INJECTION, SOLUTION SUBCUTANEOUS at 22:32

## 2018-10-29 RX ADMIN — Medication 4: at 01:07

## 2018-10-29 RX ADMIN — Medication 3 MILLILITER(S): at 08:03

## 2018-10-29 NOTE — PROGRESS NOTE ADULT - ASSESSMENT
Mr. Donohue is a 79 yo M with asthma, COPD, chronic hypoxemic respiratory failure, CAD with CABG in 2004 presents with worsening respiratory distress over a few days.  Found to have severe hypercapnic respiratory failure, initially improved on BiPAP.  Transferred to ICU and had bradycardic/PEA arrest.   Etiology of his arrest presumably respiratory in etiology  Has not had recent cardiac issues and reports a normal stress test in 8/2018.   LV function normal as of 6/2018    - Remains intubated and sedated. Vent management per ICU  - Enzyme leak noted though presumably in setting of arrest.   - EKG is Sinus tachycardia without obvious ischemia  - No sign of volume overload  - No arrythmia on telemetry  - Antiplatelets resumed  - Atorvastatin 40 restarted  - Hydralazine resumed.  BP this AM is soft.  Continue to monitor closely and hold as needed  - Watch creatinine and electrolytes  - Will follow with you    Critically ill. High risk of decompensation. >35 min spent taking care of patient.

## 2018-10-29 NOTE — PROGRESS NOTE ADULT - SUBJECTIVE AND OBJECTIVE BOX
[facility]    ALLERGY Shellfish  CONTACT Sp Amira EATON      VITALS/LABS                           10/29/2018 99f 97 116/58 23 100%   10/29/2018 7a DEX .7 m/k/h   10/29/2018 7a PROP 5 m/k/m   10/29/2018 W 10.5 Hb 11.3 Plt 235 INR 1 Na 144 K 4.6 CO2 36 Cr 1.2   10/29/2018 12p cpap 5 ps 0 .3 743/49/87   10/28/2018 afeb 109 140/70 20 100%     REVIEW OF SYMPTOMS    Able to give ROS  NO     PHYSICAL EXAM    HEENT Unremarkable PERRLA atraumatic   RESP Fair air entry EXP prolonged    Harsh breath sound Resp distres mild   CARDIAC S1 S2 No S3     NO JVD    ABDOMEN SOFT BS PRESENT NOT DISTENDED No hepatosplenomegaly PEDAL EDEMA present No calf tenderness  NO rash   GENERAL Not TOXIC looking    GLOBAL ISSUE/BEST PRACTICE:      PROBLEM: HOB elevation:   y            PROBLEM: Stress ulcer proph:    protonix 40 (10/26)                       PROBLEM: VTE prophylaxis:      HSC (10/26)  PROBLEM: PNEUMONIA PPX Chlorhexidine .12%    PROBLEM: Glycemic control:    na  PROBLEM: Nutrition:    pulmocare 1680 (10/27)   PROBLEM: Advanced directive: na     PROBLEM: Allergies:  shellfish  PROCEDURE 10/28/2018 Gonzáles (u retn)   PROCEDURE 10/26/2018 A line   PROCEDURE 10/26/2018 C line RIJ  PROCEDURE 10/26/2018 G tube   PEROCEDURE 10/26/2018 ET intubation    PATIENT DESCRIPTION 10/26/2018  ADMISSION [facility] DR DU FREEMAN  79 y/o male pmhx HTN, PVD, DM, Asthma, COPD on 2L home O2, hx CABGx3, HLD presented to ED w/ SOB and increased work of breathing. Pt admitted w/ acute on chronic hypercapnic respiratory failure due to status asthmaticus and hyperglycemia. Subsequently suffered a cardiac arrest (radycardic/PEA arrest. ) in ICU, ICU course was marked with anoxic encephalopathy s/p cardiac arrest  targeted temperature management goal 36C  required heavy sedation due to respiratory status, sedated with propofol and ketamine  ICU course marked  w/ MERRILL, lactic acidosis, and hyperkalemia.  Pulm consulted 10/28/2018     PATIENT MANAGEMENT   10/26/2018  ADMISSION [facility] P DR DU FREEMAN    CARDIAC ARREST 10/26/2018 LIKELY SEC TO RESP FAILURE   Hemodynamics remained stable Possible anoxic encephaslopathy   TARGETED TEMPERATURE THERAPY POST CAC 10/26/2018 10/28 CT H n   INTUBATION MECHANICAL VENT 10/26/2018  IV SEDATIVE DRIP FOR MECHANICAL VENTILATION  Propofol changed to DEX 10/28/2018 for weaning   COPD Managed with BD steroids    HYPERGLYCEMIA REQUIRED INSULIN DRIO 10/26-10/28/2018   MERRILL NONOLIGURIC 10/26/2018 Resolved  with supp care     PROBLEM/ASSESSMENT/PLAN    RESP TRACT INFECTION   10/26-10/27-10/28 W 15.3-18.7-17.3   MICROBIO   10/27 Sp c N fl   10/26 bc n   10/26  u c n   10/26 Leg n   10/26 RVP n   ABIO   Rocephin (10/26)   ASSESSMENT/RECOMMENDATIONS  Cont Rx    PROBLEM/ASSESSMENT/PLAN    CAD  10/26/2018 ECHO ef 55%   ASA 81 (10/28)   Plavix 75 (10/28)   Atorvastat 40 (10/28)   ASSESSMENT/RECOMMENDATIONS    PROBLEM/ASSESSMENT/PLAN    HYPERTENSION  Hydralazine 25.3  ASSESSMENT/RECOMMENDATIONS      PROBLEM/ASSESSMENT/PLAN    ACUTE COMBINED O2 CO2  RESP FAILURE SEC COPD EX   10/28/2018 AC 12/500/5/.3   10/28/2018 737/56/100  ASSESSMENT/RECOMMENDATIONS  Wean as tolerated     PROBLEM/ASSESSMENT/PLAN    VENT SEDATION  Dexmedetomidine 200 in 50 ns .3 m/k/h (10/28)   Propofol 1000 in 100 10 m/k/m (10/26)  ASSESSMENT/RECOMMENDATIONS  Target RASS 0- Neg 1     PROBLEM/ASSESSMENT/PLAN    COPD EX   Solumed 40.2 (10/28) ? Solumed 40 (10/29)   Duoneb.6 (10/27)   ASSESSMENT/RECOMMENDATIONS  Consider adding inhaled cs      PROBLEM/ASSESSMENT/PLAN    DM  Lantus 6 (10/28)   iss (10/28)   ASSESSMENT/RECOMMENDATIONS  Monitor acc    PROBLEM/ASSESSMENT/PLAN    BPH   Tamsulosin .4 (10/27)   ASSESSMENT/RECOMMENDATIONS  Cont Rx       TIME SPENT Over 25 minutes aggregate care time spent on encounter; activities included   direct patient care, counseling and/or coordinating care reviewing notes, lab data/ imaging , discussion with multidisciplinary team/ patient  /family. Risks, benefits, alternatives  discussed in detail.

## 2018-10-29 NOTE — PROGRESS NOTE ADULT - SUBJECTIVE AND OBJECTIVE BOX
[facility] - Saud Aguilar, Dani, Génesis, Medina, Kumar Hodges  Office Number:  [phone]    Patient resting comfortably in bed in NAD.  Laying flat with no respiratory distress.  He is sedated on propofol and precedex.  Following commands when off of sedation.    F/U for:  cardiac arrest    Telemetry:  Sinus rhythm    MEDICATIONS  (STANDING):  ALBUTerol/ipratropium for Nebulization 3 milliLiter(s) Nebulizer every 4 hours  aspirin  chewable 81 milliGRAM(s) Oral daily  atorvastatin 40 milliGRAM(s) Oral at bedtime  cefTRIAXone   IVPB 1 Gram(s) IV Intermittent every 24 hours  chlorhexidine 0.12% Liquid 15 milliLiter(s) Swish and Spit two times a day  clopidogrel Tablet 75 milliGRAM(s) Oral daily  dexmedetomidine Infusion 0.3 MICROgram(s)/kG/Hr (7.35 mL/Hr) IV Continuous <Continuous>  heparin  Injectable 5000 Unit(s) SubCutaneous every 8 hours  hydrALAZINE 25 milliGRAM(s) Oral every 8 hours  insulin glargine Injectable (LANTUS) 6 Unit(s) SubCutaneous at bedtime  insulin lispro (HumaLOG) corrective regimen sliding scale   SubCutaneous every 6 hours  methylPREDNISolone sodium succinate Injectable 40 milliGRAM(s) IV Push every 12 hours  pantoprazole  Injectable 40 milliGRAM(s) IV Push daily  propofol Infusion 10 MICROgram(s)/kG/Min (5.988 mL/Hr) IV Continuous <Continuous>  tamsulosin 0.4 milliGRAM(s) Oral at bedtime    MEDICATIONS  (PRN):  ALBUTerol    90 MICROgram(s) HFA Inhaler 4 Puff(s) Inhalation every 2 hours PRN Shortness of Breath and/or Wheezing      Allergies    No Known Allergies    Intolerances    shellfish (Nausea)      Vital Signs Last 24 Hrs  T(C): 38 (29 Oct 2018 04:01), Max: 38 (29 Oct 2018 00:09)  T(F): 100.4 (29 Oct 2018 04:01), Max: 100.4 (29 Oct 2018 00:09)  HR: 93 (29 Oct 2018 06:00) (65 - 124)  BP: 166/84 (28 Oct 2018 09:00) (113/63 - 166/84)  BP(mean): 115 (28 Oct 2018 09:00) (82 - 116)  RR: 20 (29 Oct 2018 06:00) (12 - 30)  SpO2: 100% (29 Oct 2018 06:00) (97% - 100%)    I&O's Summary    27 Oct 2018 07:01  -  28 Oct 2018 07:00  --------------------------------------------------------  IN: 3030 mL / OUT: 2305 mL / NET: 725 mL    28 Oct 2018 07:01  -  29 Oct 2018 06:15  --------------------------------------------------------  IN: 1359 mL / OUT: 2190 mL / NET: -831 mL        ON EXAM:    Constitutional: NAD, intubated and sedated  Lungs:  decreased bilaterally, No wheezing, rales or rhonchi  Cardiovascular: borderline tachycardic, S1 and S2 positive.  No murmurs, rubs, gallops or clicks  Gastrointestinal: Bowel Sounds present, soft, nontender.   Lymph: No peripheral edema. No cervical lymphadenopathy.  Neurological: unable to assess  Skin: No rashes or ulcers   Psych:  unable to assess    LABS: All Labs Reviewed:                        11.9   17.35 )-----------( 264      ( 28 Oct 2018 06:10 )             38.2                         11.8   18.97 )-----------( 264      ( 27 Oct 2018 06:38 )             37.8                         12.6   15.31 )-----------( 319      ( 26 Oct 2018 12:58 )             41.1     28 Oct 2018 06:10    143    |  103    |  22     ----------------------------<  182    4.9     |  34     |  0.97   27 Oct 2018 14:11    141    |  103    |  22     ----------------------------<  193    5.1     |  34     |  1.20   27 Oct 2018 06:38    140    |  103    |  21     ----------------------------<  125    4.8     |  32     |  1.20     Ca    9.9        28 Oct 2018 06:10  Ca    9.7        27 Oct 2018 14:11  Ca    9.4        27 Oct 2018 06:38  Phos  2.7       28 Oct 2018 06:10  Phos  3.0       27 Oct 2018 14:11  Phos  3.1       27 Oct 2018 06:38  Mg     2.5       28 Oct 2018 06:10  Mg     1.9       27 Oct 2018 14:11  Mg     2.1       27 Oct 2018 06:38    TPro  6.4    /  Alb  3.0    /  TBili  0.3    /  DBili  x      /  AST  24     /  ALT  72     /  AlkPhos  83     28 Oct 2018 06:10  TPro  6.7    /  Alb  3.2    /  TBili  0.3    /  DBili  x      /  AST  55     /  ALT  92     /  AlkPhos  81     27 Oct 2018 06:38  TPro  6.9    /  Alb  3.3    /  TBili  0.3    /  DBili  x      /  AST  84     /  ALT  100    /  AlkPhos  97     26 Oct 2018 12:58    PT/INR - ( 28 Oct 2018 06:10 )   PT: 10.4 sec;   INR: 0.96 ratio         PTT - ( 28 Oct 2018 06:10 )  PTT:30.1 sec      Blood Culture: Organism --  Gram Stain Blood -- Gram Stain   Few polymorphonuclear leukocytes per low power field  Moderate squamous epithelial cells per low power field  Numerous Gram positive cocci in pairs, chains and clusters  Specimen Source .Sputum Sputum - trap  Culture-Blood --    Organism --  Gram Stain Blood -- Gram Stain --  Specimen Source .Blood Blood  Culture-Blood --    Organism --  Gram Stain Blood -- Gram Stain --  Specimen Source .Blood Blood  Culture-Blood --    Organism --  Gram Stain Blood -- Gram Stain --  Specimen Source .Urine Catheterized  Culture-Blood --      10-26 @ 08:48  Pro Bnp 204

## 2018-10-29 NOTE — PROGRESS NOTE ADULT - SUBJECTIVE AND OBJECTIVE BOX
Patient is a 78y old  Male who presents with a chief complaint of Respiratory Distress (28 Oct 2018 08:41)    24 hour events: Following simple commands off sedation yesterday. Bedside ultrasound 10/28 revealed scattered B lines in lower lung fields and IVC 2.3 with no respiratory variation, dc'd fluids and gave lasix 20mg IV x1 dose. Tm 100.4 10/29 0400h.    REVIEW OF SYSTEMS******  Constitutional: No fever, chills, fatigue  Neuro: No headache, numbness, weakness  Resp: No cough, wheezing, shortness of breath  CVS: No chest pain, palpitations, leg swelling  GI: No abdominal pain, nausea, vomiting, diarrhea   : No dysuria, frequency, incontinence  Skin: No itching, burning, rashes, or lesions   Msk: No joint pain or swelling  Psych: No depression, anxiety, mood swings  Heme: No bleeding    T(F): 100.4 (10-29-18 @ 04:01), Max: 100.4 (10-29-18 @ 00:09)  HR: 103 (10-29-18 @ 08:04) (78 - 124)  BP: 166/84 (10-28-18 @ 09:00) (166/84 - 166/84)  RR: 22 (10-29-18 @ 07:00) (14 - 30)  SpO2: 100% (10-29-18 @ 08:04) (97% - 100%)  Wt(kg): --    Mode: CPAP with PS, FiO2: 30, PEEP: 5, PS: 5  CAPILLARY BLOOD GLUCOSE    POCT Blood Glucose.: 184 mg/dL (29 Oct 2018 05:23)  POCT Blood Glucose.: 248 mg/dL (29 Oct 2018 00:43)  POCT Blood Glucose.: 174 mg/dL (28 Oct 2018 17:13)  POCT Blood Glucose.: 217 mg/dL (28 Oct 2018 14:05)  POCT Blood Glucose.: 215 mg/dL (28 Oct 2018 13:15)  POCT Blood Glucose.: 208 mg/dL (28 Oct 2018 11:23)  POCT Blood Glucose.: 206 mg/dL (28 Oct 2018 10:20)  POCT Blood Glucose.: 199 mg/dL (28 Oct 2018 09:08)  POCT Blood Glucose.: 190 mg/dL (28 Oct 2018 08:08)    I&O's Summary    10-28 @ 07:01  -  10-29 @ 07:00  --------------------------------------------------------  IN: 1920.1 mL / OUT: 2710 mL / NET: -789.9 mL      PHYSICAL EXAM  Constitutional: Appears comfortable, intubated & sedated  HEENT: pupils 2mm equal and reacting to light  Neck: supple, Central Line to RIJ-dressing, clean, dry & intact, ET tube, OG tube  Cardiovascular: Regular rate & rhythm, +S1/S2  Respiratory: Inspiratory & expiratory wheezes, on ventilator, no rales or rhonchi  Gastrointestinal: soft, non-tender, non distended, bowel sounds present x4  Extremities; No edema noted, no cyanosis or no clubbing,  Vascular: bilateral pedal pulse +2, capillary refill < 3 seconds  Neurological: intubated & sedated  Musculoskeletal: unable to assess d/t sedation   Skin: warm, dry, normal skin turgor    MEDICATIONS  cefTRIAXone   IVPB IV Intermittent  hydrALAZINE Oral  tamsulosin Oral  atorvastatin Oral  insulin glargine Injectable (LANTUS) SubCutaneous  insulin lispro (HumaLOG) corrective regimen sliding scale SubCutaneous  methylPREDNISolone sodium succinate Injectable IV Push  ALBUTerol    90 MICROgram(s) HFA Inhaler Inhalation PRN  ALBUTerol/ipratropium for Nebulization Nebulizer  dexmedetomidine Infusion IV Continuous  propofol Infusion IV Continuous  aspirin  chewable Oral  clopidogrel Tablet Oral  heparin  Injectable SubCutaneous  pantoprazole  Injectable IV Push  chlorhexidine 0.12% Liquid Swish and Spit                            11.3   10.53 )-----------( 235      ( 29 Oct 2018 06:45 )             36.4       10-29    144  |  106  |  25<H>  ----------------------------<  171<H>  4.6   |  36<H>  |  1.20    Ca    8.9      29 Oct 2018 06:45  Phos  2.7     10-29  Mg     2.2     10-29    TPro  5.9<L>  /  Alb  2.6<L>  /  TBili  0.3  /  DBili  x   /  AST  16  /  ALT  57  /  AlkPhos  76  10-29      PT/INR - ( 29 Oct 2018 06:45 )   PT: 11.0 sec;   INR: 1.01 ratio         PTT - ( 29 Oct 2018 06:45 )  PTT:31.4 sec    .Sputum Sputum - trap   Normal Respiratory Maria Esther present   Few polymorphonuclear leukocytes per low power field  Moderate squamous epithelial cells per low power field  Numerous Gram positive cocci in pairs, chains and clusters 10-27 @ 10:23  .Blood Blood   No growth to date. -- 10-26 @ 20:03  .Blood Blood   No growth to date. -- 10-26 @ 20:01  .Urine Catheterized   No growth -- 10-26 @ 19:53      Rapid RVP Result: NotDetec (10-26 @ 10:58)    Radiology: ***  Bedside lung ultrasound: ***  Bedside ECHO: ***    CENTRAL LINE: Y/N          DATE INSERTED:              REMOVE: Y/N  SHARMA: Y/N                        DATE INSERTED:              REMOVE: Y/N  A-LINE: Y/N                       DATE INSERTED:              REMOVE: Y/N    GLOBAL ISSUE/BEST PRACTICE  Analgesia:   Sedation:   CAM-ICU:   HOB elevation: yes  Stress ulcer prophylaxis:   VTE prophylaxis:   Glycemic control:   Nutrition:     CODE STATUS: ***  [facility] discussion: Y Patient is a 78y old  Male who presents with a chief complaint of Respiratory Distress (28 Oct 2018 08:41)    24 hour events: Following simple commands off sedation yesterday. Bedside ultrasound 10/28 revealed scattered B lines in lower lung fields and IVC 2.3 with no respiratory variation, dc'd fluids and gave lasix 20mg IV x1 dose. Tm 100.4 10/29 0400h.    REVIEW OF SYSTEMS******  Constitutional: No fever, chills, fatigue  Neuro: No headache, numbness, weakness  Resp: No cough, wheezing, shortness of breath  CVS: No chest pain, palpitations, leg swelling  GI: No abdominal pain, nausea, vomiting, diarrhea   : No dysuria, frequency, incontinence  Skin: No itching, burning, rashes, or lesions   Msk: No joint pain or swelling  Psych: No depression, anxiety, mood swings  Heme: No bleeding    T(F): 100.4 (10-29-18 @ 04:01), Max: 100.4 (10-29-18 @ 00:09)  HR: 103 (10-29-18 @ 08:04) (78 - 124)  BP: 166/84 (10-28-18 @ 09:00) (166/84 - 166/84)  RR: 22 (10-29-18 @ 07:00) (14 - 30)  SpO2: 100% (10-29-18 @ 08:04) (97% - 100%)  Wt(kg): --    Mode: CPAP with PS, FiO2: 30, PEEP: 5, PS: 5  CAPILLARY BLOOD GLUCOSE    POCT Blood Glucose.: 184 mg/dL (29 Oct 2018 05:23)  POCT Blood Glucose.: 248 mg/dL (29 Oct 2018 00:43)  POCT Blood Glucose.: 174 mg/dL (28 Oct 2018 17:13)  POCT Blood Glucose.: 217 mg/dL (28 Oct 2018 14:05)  POCT Blood Glucose.: 215 mg/dL (28 Oct 2018 13:15)  POCT Blood Glucose.: 208 mg/dL (28 Oct 2018 11:23)  POCT Blood Glucose.: 206 mg/dL (28 Oct 2018 10:20)  POCT Blood Glucose.: 199 mg/dL (28 Oct 2018 09:08)  POCT Blood Glucose.: 190 mg/dL (28 Oct 2018 08:08)    I&O's Summary    10-28 @ 07:01  -  10-29 @ 07:00  --------------------------------------------------------  IN: 1920.1 mL / OUT: 2710 mL / NET: -789.9 mL      PHYSICAL EXAM  Physical Exam:   Constitutional: Appears comfortable, intubated & sedated  HEENT: pupils 2mm equal and reacting to light  Neck: supple, Central Line to RIJ-dressing, clean, dry & intact, ET tube, OG tube  Cardiovascular: Regular rate & rhythm, +S1/S2  Respiratory: Inspiratory & expiratory wheezes, on ventilator, no rales or rhonchi  Gastrointestinal: soft, non-tender, non distended, bowel sounds present x4  Extremities; No edema noted, no cyanosis or no clubbing,  Vascular: bilateral pedal pulse +2, capillary refill < 3 seconds  Neurological: intubated & sedated  Musculoskeletal: unable to assess d/t sedation   Skin: warm, dry, normal skin turgor    MEDICATIONS  cefTRIAXone   IVPB IV Intermittent  hydrALAZINE Oral  tamsulosin Oral  atorvastatin Oral  insulin glargine Injectable (LANTUS) SubCutaneous  insulin lispro (HumaLOG) corrective regimen sliding scale SubCutaneous  methylPREDNISolone sodium succinate Injectable IV Push  ALBUTerol    90 MICROgram(s) HFA Inhaler Inhalation PRN  ALBUTerol/ipratropium for Nebulization Nebulizer  dexmedetomidine Infusion IV Continuous  propofol Infusion IV Continuous  aspirin  chewable Oral  clopidogrel Tablet Oral  heparin  Injectable SubCutaneous  pantoprazole  Injectable IV Push  chlorhexidine 0.12% Liquid Swish and Spit                            11.3   10.53 )-----------( 235      ( 29 Oct 2018 06:45 )             36.4       10-29    144  |  106  |  25<H>  ----------------------------<  171<H>  4.6   |  36<H>  |  1.20    Ca    8.9      29 Oct 2018 06:45  Phos  2.7     10-29  Mg     2.2     10-29    TPro  5.9<L>  /  Alb  2.6<L>  /  TBili  0.3  /  DBili  x   /  AST  16  /  ALT  57  /  AlkPhos  76  10-29      PT/INR - ( 29 Oct 2018 06:45 )   PT: 11.0 sec;   INR: 1.01 ratio         PTT - ( 29 Oct 2018 06:45 )  PTT:31.4 sec    .Sputum Sputum - trap   Normal Respiratory Maria Esther present   Few polymorphonuclear leukocytes per low power field  Moderate squamous epithelial cells per low power field  Numerous Gram positive cocci in pairs, chains and clusters 10-27 @ 10:23  .Blood Blood   No growth to date. -- 10-26 @ 20:03  .Blood Blood   No growth to date. -- 10-26 @ 20:01  .Urine Catheterized   No growth -- 10-26 @ 19:53      Rapid RVP Result: NotDetec (10-26 @ 10:58)    Radiology: ***  Bedside lung ultrasound: ***  Bedside ECHO: ***    CENTRAL LINE: Y/N          DATE INSERTED:              REMOVE: Y/N  SHARMA: Y/N                        DATE INSERTED:              REMOVE: Y/N  A-LINE: Y/N                       DATE INSERTED:              REMOVE: Y/N    GLOBAL ISSUE/BEST PRACTICE  Analgesia:   Sedation:   CAM-ICU:   HOB elevation: yes  Stress ulcer prophylaxis:   VTE prophylaxis:   Glycemic control:   Nutrition:     CODE STATUS: ***  [facility] discussion: Y Patient is a 78y old  Male who presents with a chief complaint of Respiratory Distress (28 Oct 2018 08:41)    24 hour events: Following simple commands off sedation yesterday. Bedside ultrasound 10/28 revealed scattered B lines in lower lung fields and IVC 2.3 with no respiratory variation, dc'd fluids and gave lasix 20mg IV x1 dose. Tm 100.4 10/29 0400h.    REVIEW OF SYSTEMS  Deferred secondary to patient's intubated status.      T(F): 100.4 (10-29-18 @ 04:01), Max: 100.4 (10-29-18 @ 00:09)  HR: 103 (10-29-18 @ 08:04) (78 - 124)  BP: 166/84 (10-28-18 @ 09:00) (166/84 - 166/84)  RR: 22 (10-29-18 @ 07:00) (14 - 30)  SpO2: 100% (10-29-18 @ 08:04) (97% - 100%)  Wt(kg): --    Mode: CPAP with PS, FiO2: 30, PEEP: 5, PS: 5  CAPILLARY BLOOD GLUCOSE    POCT Blood Glucose.: 184 mg/dL (29 Oct 2018 05:23)  POCT Blood Glucose.: 248 mg/dL (29 Oct 2018 00:43)  POCT Blood Glucose.: 174 mg/dL (28 Oct 2018 17:13)  POCT Blood Glucose.: 217 mg/dL (28 Oct 2018 14:05)  POCT Blood Glucose.: 215 mg/dL (28 Oct 2018 13:15)  POCT Blood Glucose.: 208 mg/dL (28 Oct 2018 11:23)  POCT Blood Glucose.: 206 mg/dL (28 Oct 2018 10:20)  POCT Blood Glucose.: 199 mg/dL (28 Oct 2018 09:08)  POCT Blood Glucose.: 190 mg/dL (28 Oct 2018 08:08)    I&O's Summary    10-28 @ 07:01  -  10-29 @ 07:00  --------------------------------------------------------  IN: 1920.1 mL / OUT: 2710 mL / NET: -789.9 mL      PHYSICAL EXAM  Physical Exam:   Constitutional: Appears comfortable, intubated & sedated  HEENT: pupils 2mm equal and reacting to light  Neck: supple, Central Line to RIJ-dressing, clean, dry & intact, ET tube, OG tube  Cardiovascular: Regular rate & rhythm, +S1/S2  Respiratory: expiratory wheezes, on ventilator, no rales or rhonchi  Gastrointestinal: soft, non-tender, non distended, bowel sounds present x4  Extremities; No edema noted, no cyanosis or no clubbing,  Vascular: bilateral pedal pulse +2, capillary refill < 2 seconds  Neurological: intubated & sedated  Musculoskeletal: unable to assess d/t sedation   Skin: warm, dry, normal skin turgor    MEDICATIONS  cefTRIAXone   IVPB IV Intermittent  hydrALAZINE Oral  tamsulosin Oral  atorvastatin Oral  insulin glargine Injectable (LANTUS) SubCutaneous  insulin lispro (HumaLOG) corrective regimen sliding scale SubCutaneous  methylPREDNISolone sodium succinate Injectable IV Push  ALBUTerol    90 MICROgram(s) HFA Inhaler Inhalation PRN  ALBUTerol/ipratropium for Nebulization Nebulizer  dexmedetomidine Infusion IV Continuous  propofol Infusion IV Continuous  aspirin  chewable Oral  clopidogrel Tablet Oral  heparin  Injectable SubCutaneous  pantoprazole  Injectable IV Push  chlorhexidine 0.12% Liquid Swish and Spit                            11.3   10.53 )-----------( 235      ( 29 Oct 2018 06:45 )             36.4       10-29    144  |  106  |  25<H>  ----------------------------<  171<H>  4.6   |  36<H>  |  1.20    Ca    8.9      29 Oct 2018 06:45  Phos  2.7     10-29  Mg     2.2     10-29    TPro  5.9<L>  /  Alb  2.6<L>  /  TBili  0.3  /  DBili  x   /  AST  16  /  ALT  57  /  AlkPhos  76  10-29      PT/INR - ( 29 Oct 2018 06:45 )   PT: 11.0 sec;   INR: 1.01 ratio         PTT - ( 29 Oct 2018 06:45 )  PTT:31.4 sec    .Sputum Sputum - trap   Normal Respiratory Maria Esther present   Few polymorphonuclear leukocytes per low power field  Moderate squamous epithelial cells per low power field  Numerous Gram positive cocci in pairs, chains and clusters 10-27 @ 10:23  .Blood Blood   No growth to date. -- 10-26 @ 20:03  .Blood Blood   No growth to date. -- 10-26 @ 20:01  .Urine Catheterized   No growth -- 10-26 @ 19:53      Rapid RVP Result: NotDetec (10-26 @ 10:58)    Radiology: x  Bedside lung ultrasound: x  Bedside ECHO: x    CENTRAL LINE:RIJ      DATE INSERTED:  10/26/18            REMOVE: Y/N  SHARMA: Yes                DATE INSERTED: 10/26/18             REMOVE: Y/N  A-LINE: Y (right radial)             DATE INSERTED: 10/26/18             REMOVE: Y/N    GLOBAL ISSUE/BEST PRACTICE:  Analgesia: n/a  Sedation propofol drip  CAM-ICU: [facility]  HOB elevation: yes  Stress ulcer prophylaxis: Pantoprazole 40mg IVP twice daily  VTE prophylaxis: he eryn 5000 units SQ every 8 hours  Glycemic control: blood glucose monitoring every 6 hours, insulin coverage  Nutrition: feedings via OG tube    CODE STATUS: Full Code

## 2018-10-29 NOTE — PROGRESS NOTE ADULT - SUBJECTIVE AND OBJECTIVE BOX
Neurology Follow up note    YUE [id number]    HPI: PRESENTED WITH RESPIRATORY DISTRESS.      Interval History - NO NEW EVENTS.    Patient is seen, chart was reviewed and case was discussed with the treatment team.  Pt is not in any distress.   Lying on bed comfortably.   No events reported overnight.   No clinical seizure was reported.  Sitting on chair bed comfortably.    is at bedside.    Vital Signs Last 24 Hrs  T(C): 37.5 (29 Oct 2018 12:53), Max: 38 (29 Oct 2018 00:09)  T(F): 99.5 (29 Oct 2018 12:53), Max: 100.4 (29 Oct 2018 00:09)  HR: 100 (29 Oct 2018 12:00) (9 - 124)  BP: --  BP(mean): --  RR: 21 (29 Oct 2018 12:00) (15 - 27)  SpO2: 100% (29 Oct 2018 12:00) (97% - 100%)          On Neurological Examination:    Mental Status - Pt is more responsive today , following simple commands.     Speech -  seems to comprehend .    Cranial Nerves - Pupils 3 mm equal and reactive to light, extraocular eye movements intact,.  Pt has no  facial asymmetry.     Muscle tone - is normal      Motor Exam - 3/5 grossly    Sensory Exam; Pt withdraws all extremities equally on stimulation.       Deep tendon Reflexes - 2 plus all over.         Neck Supple -  Yes.     MEDICATIONS    ALBUTerol    90 MICROgram(s) HFA Inhaler 4 Puff(s) Inhalation every 2 hours  ALBUTerol/ipratropium for Nebulization 3 milliLiter(s) Nebulizer every 4 hours  aspirin  chewable 81 milliGRAM(s) Oral daily  atorvastatin 40 milliGRAM(s) Oral at bedtime  cefTRIAXone   IVPB 1 Gram(s) IV Intermittent every 24 hours  chlorhexidine 0.12% Liquid 15 milliLiter(s) Swish and Spit two times a day  clopidogrel Tablet 75 milliGRAM(s) Oral daily  heparin  Injectable 5000 Unit(s) SubCutaneous every 8 hours  hydrALAZINE 25 milliGRAM(s) Oral every 8 hours  insulin glargine Injectable (LANTUS) 6 Unit(s) SubCutaneous at bedtime  insulin lispro (HumaLOG) corrective regimen sliding scale   SubCutaneous every 6 hours  methylPREDNISolone sodium succinate Injectable 40 milliGRAM(s) IV Push every 12 hours  pantoprazole  Injectable 40 milliGRAM(s) IV Push daily  propofol Infusion 10 MICROgram(s)/kG/Min IV Continuous <Continuous>  tamsulosin 0.4 milliGRAM(s) Oral at bedtime      Allergies    No Known Allergies    Intolerances    shellfish (Nausea)      LABS:  CBC Full  -  ( 28 Oct 2018 06:10 )  WBC Count : 17.35 K/uL  Hemoglobin : 11.9 g/dL  Hematocrit : 38.2 %  Platelet Count - Automated : 264 K/uL  Mean Cell Volume : 89.0 fl  Mean Cell Hemoglobin : 27.7 pg  Mean Cell Hemoglobin Concentration : 31.2 gm/dL  Auto Neutrophil # : 15.74 K/uL  Auto Lymphocyte # : 0.47 K/uL  Auto Monocyte # : 1.00 K/uL  Auto Eosinophil # : 0.00 K/uL  Auto Basophil # : 0.01 K/uL  Auto Neutrophil % : 90.7 %  Auto Lymphocyte % : 2.7 %  Auto Monocyte % : 5.8 %  Auto Eosinophil % : 0.0 %  Auto Basophil % : 0.1 %    Urinalysis Basic - ( 26 Oct 2018 14:28 )    Color: Yellow / Appearance: Turbid / S.025 / pH: x  Gluc: x / Ketone: Negative  / Bili: Negative / Urobili: Negative   Blood: x / Protein: 150 mg/dL / Nitrite: Negative   Leuk Esterase: Negative / RBC: 0-2 /HPF / WBC 0-2   Sq Epi: x / Non Sq Epi: Few / Bacteria: Moderate      10-28    143  |  103  |  22  ----------------------------<  182<H>  4.9   |  34<H>  |  0.97    Ca    9.9      28 Oct 2018 06:10  Phos  2.7     10-28  Mg     2.5     10-28    TPro  6.4  /  Alb  3.0<L>  /  TBili  0.3  /  DBili  x   /  AST  24  /  ALT  72  /  AlkPhos  83  10-28    Hemoglobin A1C:     Vitamin B12     RADIOLOGY  < from: CT Head No Cont (10.28.18 @ 09:46) >    EXAM:  CT BRAIN                            PROCEDURE DATE:  10/28/2018          INTERPRETATION:  CLINICAL STATEMENT: Altered consciousness    TECHNIQUE: CT of the head was performed without IV contrast.    COMPARISON: None.    FINDINGS:  There is moderate diffuse parenchymal volume loss. There are areas of low   attenuation in the periventricular white matter likely related to mild   chronic microvascular ischemic changes.    There is no acute intracranial hemorrhage, parenchymal mass, or midline   shift. There is no acute territorial infarct. There is no hydrocephalus.    Extra-axial mass measuring 2.2 x 1.4 cm noted in the left parietal region   containing calcifications which may represent meningioma.    The cranium is intact.         Mild mucosal thickening paranasal sinuses.    IMPRESSION:  No acute intracranial hemorrhage or acute territorial infarct.                ALEKS HERBERT M.D., ATTENDING RADIOLOGIST  This document has been electronically signed. Oct 28 2018  9:59AM                ASSESSMENT AND PLAN:      hypoxic encephalopathy improving mentation.  sp cardiac arrest on hypothermia protocol.  respiratory failure on vent.    sedation is being tapered off.  would maintain adequate bp/oxygenation.  Plan of care was discussed with family. Questions answered.  Would continue to follow.

## 2018-10-29 NOTE — PROGRESS NOTE ADULT - ATTENDING COMMENTS
78M PMH Asthma/COPD with chronic hypoxic respiratory failure on home oxygen, HTN, HLD, CAD, PVD, and DM2 who presents with acute hypoxic and hypercapnic respiratory failure due to status asthmaticus complicated by PEA cardiac arrest s/p ACLS with ROSC, now recovering.    - Awake and alert, following commands, no evidence of anoxic encephalopathy. PT eval  - HD stable. Had episode of SVT in the 200s post-extubation. Failed carotid massage. Adenosine not given due to resolving status asthmaticus. Received metoprolol 5mg IV, converted to NSR. Started on metoprolol 25 mg bid  - CAD, continue aspirin, plavix, and statin  - Passed SBT, extubated to NC, doing well. Taper methylprednisolone to 40 mg IV qd. Restart ICS/LABA/LAMA  - Keep NPO today, advance diet as tolerates. S&S eval  - MERRILL improved, strict I/O's, trend kidney function and lytes  - Continue ceftriaxone to complete 5-7 day course. S/p 3 days azithromycin. RVP and ULEg negative  - DVT ppx with HSQ  - Continue lantus + insulin coverage scale, improved hyperglycemia  - DIscussed with patient and wife at bedside, full code  CC time spent: 35 min

## 2018-10-29 NOTE — PROGRESS NOTE ADULT - ASSESSMENT
Assessment and Plan:  - On hypothermia protocol.  Ventilator support.  Intensivist and Cardiology f/u  Ventilator support.    -MERRILL:  resolved with hydration.  -Type 2 DM: -CAD: -HTN:  ia OGT Q8h  -BPH:  -Ppx:

## 2018-10-29 NOTE — PROGRESS NOTE ADULT - SUBJECTIVE AND OBJECTIVE BOX
Patient is a 78y old  Male who presents with a chief complaint of Respiratory Distress (28 Oct 2018 08:41)    Follow up for resp distress: Pt. seen and evaluated for s/p PEA cardiac arrest and acute on chronic respiratory failure.   INTERVAL HPI: Pt seen and examined bedside, has no complaints. extubated and AAOx3. denies any SOB, CP, With O2 NC  OVERNIGHT EVENTS:  T(F): 99.1 (10-29-18 @ 16:16), Max: 100.4 (10-29-18 @ 00:09)  HR: 121 (10-29-18 @ 17:00) (9 - 124)  BP: 130/76 (10-29-18 @ 17:00) (125/75 - 130/76)  RR: 31 (10-29-18 @ 17:00) (15 - 31)  SpO2: 100% (10-29-18 @ 17:00) (97% - 100%)  Wt(kg): --  I&O's Summary    28 Oct 2018 07:01  -  29 Oct 2018 07:00  --------------------------------------------------------  IN: 1920.1 mL / OUT: 2710 mL / NET: -789.9 mL    29 Oct 2018 07:01  -  29 Oct 2018 17:07  --------------------------------------------------------  IN: 479.2 mL / OUT: 465 mL / NET: 14.2 mL        REVIEW OF SYSTEM:    Constitutional: No fever, chills, fatigue  Neuro: No headache, numbness, weakness  Resp: No cough, wheezing, shortness of breath  CVS: No chest pain, palpitations, leg swelling  GI: No abdominal pain, nausea, vomiting, diarrhea   : No dysuria, frequency, incontinence  Skin: No itching, burning, rashes, or lesions   Msk: No joint pain or swelling  Psych: No depression, anxiety, mood swings          PHYSICAL EXAM:  GENERAL: NAD, well-developed with O2 nc  HEAD:  Atraumatic, Normocephalic  EYES: EOMI, PERRLA, conjunctiva and sclera clear  NECK: Supple, No JVD, Normal thyroid  HEART: Regular rate and rhythm; No murmurs, rubs, or gallops  RESPIRATORY: CTA B/L, No wheezing / rhonchi  ABDOMEN: Soft, Nontender, Nondistended; Bowel sounds present  NEUROLOGY: A&Ox3, nonfocal, moving all extremities.  EXTREMITIES:  2+ Peripheral Pulses, No clubbing, cyanosis, or edema  SKIN: warm, dry, normal color, no rash or abnormal lesions        LABS:                        11.3   10.53 )-----------( 235      ( 29 Oct 2018 06:45 )             36.4     10-29    144  |  106  |  25<H>  ----------------------------<  171<H>  4.6   |  36<H>  |  1.20    Ca    8.9      29 Oct 2018 06:45  Phos  2.7     10-29  Mg     2.2     10-29    TPro  5.9<L>  /  Alb  2.6<L>  /  TBili  0.3  /  DBili  x   /  AST  16  /  ALT  57  /  AlkPhos  76  10-29    PT/INR - ( 29 Oct 2018 06:45 )   PT: 11.0 sec;   INR: 1.01 ratio         PTT - ( 29 Oct 2018 06:45 )  PTT:31.4 sec    CAPILLARY BLOOD GLUCOSE      POCT Blood Glucose.: 311 mg/dL (29 Oct 2018 12:02)  POCT Blood Glucose.: 184 mg/dL (29 Oct 2018 05:23)  POCT Blood Glucose.: 248 mg/dL (29 Oct 2018 00:43)  POCT Blood Glucose.: 174 mg/dL (28 Oct 2018 17:13)    ABG - ( 29 Oct 2018 12:48 )  pH, Arterial: 7.43  pH, Blood: x     /  pCO2: 49    /  pO2: 87    / HCO3: 31    / Base Excess: 7.6   /  SaO2: 97                10-27 @ 10:23   Normal Respiratory Maria Esther present  --  --  10-26 @ 20:03   No growth to date.  --  --  10-26 @ 20:01   No growth to date.  --  --  10-26 @ 19:53   No growth  --  --          MEDICATIONS  (STANDING):  aspirin  chewable 81 milliGRAM(s) Oral daily  atorvastatin 40 milliGRAM(s) Oral at bedtime  buDESOnide 160 MICROgram(s)/formoterol 4.5 MICROgram(s) Inhaler 2 Puff(s) Inhalation two times a day  cefTRIAXone   IVPB 1 Gram(s) IV Intermittent every 24 hours  chlorhexidine 0.12% Liquid 15 milliLiter(s) Swish and Spit two times a day  clopidogrel Tablet 75 milliGRAM(s) Oral daily  heparin  Injectable 5000 Unit(s) SubCutaneous every 8 hours  hydrALAZINE 25 milliGRAM(s) Oral every 8 hours  insulin glargine Injectable (LANTUS) 6 Unit(s) SubCutaneous at bedtime  insulin lispro (HumaLOG) corrective regimen sliding scale   SubCutaneous every 6 hours  methylPREDNISolone sodium succinate Injectable 40 milliGRAM(s) IV Push daily  pantoprazole  Injectable 40 milliGRAM(s) IV Push daily  tamsulosin 0.4 milliGRAM(s) Oral at bedtime  tiotropium 18 MICROgram(s) Capsule 1 Capsule(s) Inhalation daily    MEDICATIONS  (PRN):  ALBUTerol/ipratropium for Nebulization 3 milliLiter(s) Nebulizer every 4 hours PRN Shortness of Breath and/or Wheezing

## 2018-10-29 NOTE — PROGRESS NOTE ADULT - ASSESSMENT
77 yo M with Hx asthma, COPD, chronic hypoxemic respiratory failure, CAD with CABG presented with worsening respiratory distress, admited 10/26 with SOB for 2 days with status asthmaticus but  found to have severe hypercapnic respiratory failure, initially improved on BiPAP. Hospital Course was then complicated by bradycardic PEA arrest resulting anoxic encephalopathy and MERRILL.    Neuro:    -Currently sedated on propofol, Precedex  -Following simple commands yesterday (10/28) off sedation  -continue targeted temperature management  -CT Head (10/28): Negative    Cardio:  -HD Stable  -TTE performed-EF 55-60%  -Continue aspirin & plavix (for CAD and PVD)  -continued antihypertensives--on diovan at home, will hold off on ACE/ARB in setting of MERRILL recovery, no BB secondary to asthma, continue start on hydralazine 25mg oral every 8 hours  -Bedside ultrasound 10/28 revealed scattered B lines in lower lung fields and IVC 2.3 with no respiratory variation, dc'd fluids and gave lasix 20mg IV x1 dose    Pulm:  -Treatment regimen for status asthmaticus--decreased frequency  methylprednisone to 40mg IV every 12 hours, continue albuterol & duonebs  -Continue ventilator management to improve ventilation in setting of status asthmaticus    Endocrine  -Blood glucose level 171   -ISS  -Long-acting Insulin    Renal:  -Resolved MERRILL  -Avoid nephrotoxins  -Cunha for strict intake and output monitoring  -Continue Flomax  -No electrolyte abnormalities    GI  -Tolerating feeds  -Protonix    ID  -Rocephin until 10/30  -Azithro completed 10/28  -Sputum Cx (10/27): GPCC  -BCx (10/26): NGTD (10/29)  -UCx (10/26): NGF    Tubes/Lines  -Right IJ central line  -Right radial A line  -cunha 77 yo M with Hx asthma, COPD, chronic hypoxemic respiratory failure, CAD with CABG presented with worsening respiratory distress, admited 10/26 with SOB for 2 days with status asthmaticus but  found to have severe hypercapnic respiratory failure, initially improved on BiPAP. Hospital Course was then complicated by bradycardic PEA arrest resulting anoxic encephalopathy and MERRILL.    Neuro:    -Currently sedated on propofol, Precedex  -Following simple commands yesterday (10/28) off sedation  -continue targeted temperature management  -CT Head (10/28): Negative    Cardio:  -HD Stable  -TTE performed-EF 55-60%  -Continue aspirin & plavix (for CAD and PVD)  -continued antihypertensives--on diovan at home, will hold off on ACE/ARB in setting of MERRILL recovery, no BB secondary to asthma, continue start on hydralazine 25mg oral every 8 hours  -Bedside ultrasound 10/28 revealed scattered B lines in lower lung fields and IVC 2.3 with no respiratory variation, dc'd fluids and gave lasix 20mg IV x1 dose    Pulm:  -Treatment regimen for status asthmaticus--decreased frequency  methylprednisone to 40mg IV every 12 hours, continue albuterol & duonebs  -Continue ventilator management to improve ventilation in setting of status asthmaticus    Endocrine  -Blood glucose level 171   -ISS  -Long-acting Insulin    Renal:  -Resolved MERRILL  -Avoid nephrotoxins  -Cunha for strict intake and output monitoring  -Continue Flomax  -No electrolyte abnormalities    GI  -Tolerating feeds  -Protonix    ID  -Rocephin until 10/30  -Azithro completed 10/28  -Sputum Cx (10/27): GPCC  -BCx (10/26): NGTD (10/29)  -UCx (10/26): NGF    Prophylaxis:  -Protonix  -Heparin    Tubes/Lines  -Right IJ central line  -Right radial A line  -cunha 77 yo M with Hx asthma, COPD, chronic hypoxemic respiratory failure, CAD with CABG presented with worsening respiratory distress, admited 10/26 with SOB for 2 days with status asthmaticus but  found to have severe hypercapnic respiratory failure, initially improved on BiPAP. Hospital Course was then complicated by bradycardic PEA arrest resulting anoxic encephalopathy and MERRILL.    Neuro:  -Alert and oriented, following commands  -DC Propofol and Precedex  -CT Head (10/28): Negative    Cardio:  -HD Stable  -TTE performed-EF 55-60%  -Continue aspirin & plavix (for CAD and PVD)  -continued antihypertensives--on diovan at home, will hold off on ACE/ARB in setting of MERRILL recovery, no BB secondary to asthma  -continue start on hydralazine 25mg oral every 8 hours with hold parameters for hypotension  -Bedside ultrasound 10/28 revealed scattered B lines in lower lung fields and IVC 2.3 with no respiratory variation, dc'd fluids and gave lasix 20mg IV x1 dose 10/28  -Will DC A-line today.  -Will DC Central line pending peripheral access.    Pulm:  -Treatment regimen for status asthmaticus--decreased dose of methylprednisone to 40 mg qd, will continue to taper   -continue symbicort, spiriva, albuterol  -Will extubate today. Supplemental O2 as needed.    Endocrine  -Blood glucose level 171   -ISS  -Long-acting Insulin    Renal:  -Resolved MERRILL  -Avoid nephrotoxins  -Alphonse for strict intake and output monitoring  -Continue Flomax  -No electrolyte abnormalities    GI  -Tolerating feeds  -Protonix    ID  -Rocephin day 4/5.  -Azithro completed 10/28  -Sputum Cx (10/27): Normal respiratory shayy  -BCx (10/26): NGTD (10/29)  -UCx (10/26): NGF    Prophylaxis:  -Protonix  -Heparin    Tubes/Lines  -Right IJ central line: consider dc today if peripheral access  -Right radial A line: will dc today  -alphonse

## 2018-10-30 LAB
ALBUMIN SERPL ELPH-MCNC: 2.9 G/DL — LOW (ref 3.3–5)
ALP SERPL-CCNC: 81 U/L — SIGNIFICANT CHANGE UP (ref 40–120)
ALT FLD-CCNC: 54 U/L — SIGNIFICANT CHANGE UP (ref 12–78)
ANION GAP SERPL CALC-SCNC: 5 MMOL/L — SIGNIFICANT CHANGE UP (ref 5–17)
APTT BLD: 37.1 SEC — SIGNIFICANT CHANGE UP (ref 27.5–37.4)
AST SERPL-CCNC: 20 U/L — SIGNIFICANT CHANGE UP (ref 15–37)
BASOPHILS # BLD AUTO: 0.01 K/UL — SIGNIFICANT CHANGE UP (ref 0–0.2)
BASOPHILS NFR BLD AUTO: 0.1 % — SIGNIFICANT CHANGE UP (ref 0–2)
BILIRUB SERPL-MCNC: 0.4 MG/DL — SIGNIFICANT CHANGE UP (ref 0.2–1.2)
BUN SERPL-MCNC: 28 MG/DL — HIGH (ref 7–23)
CALCIUM SERPL-MCNC: 9.3 MG/DL — SIGNIFICANT CHANGE UP (ref 8.5–10.1)
CHLORIDE SERPL-SCNC: 104 MMOL/L — SIGNIFICANT CHANGE UP (ref 96–108)
CO2 SERPL-SCNC: 37 MMOL/L — HIGH (ref 22–31)
CREAT SERPL-MCNC: 1.2 MG/DL — SIGNIFICANT CHANGE UP (ref 0.5–1.3)
EOSINOPHIL # BLD AUTO: 0.07 K/UL — SIGNIFICANT CHANGE UP (ref 0–0.5)
EOSINOPHIL NFR BLD AUTO: 0.5 % — SIGNIFICANT CHANGE UP (ref 0–6)
GLUCOSE SERPL-MCNC: 139 MG/DL — HIGH (ref 70–99)
HCT VFR BLD CALC: 41.5 % — SIGNIFICANT CHANGE UP (ref 39–50)
HGB BLD-MCNC: 12.8 G/DL — LOW (ref 13–17)
IMM GRANULOCYTES NFR BLD AUTO: 0.8 % — SIGNIFICANT CHANGE UP (ref 0–1.5)
INR BLD: 1.03 RATIO — SIGNIFICANT CHANGE UP (ref 0.88–1.16)
LYMPHOCYTES # BLD AUTO: 18.1 % — SIGNIFICANT CHANGE UP (ref 13–44)
LYMPHOCYTES # BLD AUTO: 2.44 K/UL — SIGNIFICANT CHANGE UP (ref 1–3.3)
MAGNESIUM SERPL-MCNC: 2.1 MG/DL — SIGNIFICANT CHANGE UP (ref 1.6–2.6)
MCHC RBC-ENTMCNC: 27.5 PG — SIGNIFICANT CHANGE UP (ref 27–34)
MCHC RBC-ENTMCNC: 30.8 GM/DL — LOW (ref 32–36)
MCV RBC AUTO: 89.2 FL — SIGNIFICANT CHANGE UP (ref 80–100)
MONOCYTES # BLD AUTO: 1.45 K/UL — HIGH (ref 0–0.9)
MONOCYTES NFR BLD AUTO: 10.7 % — SIGNIFICANT CHANGE UP (ref 2–14)
NEUTROPHILS # BLD AUTO: 9.43 K/UL — HIGH (ref 1.8–7.4)
NEUTROPHILS NFR BLD AUTO: 69.8 % — SIGNIFICANT CHANGE UP (ref 43–77)
PHOSPHATE SERPL-MCNC: 2.4 MG/DL — LOW (ref 2.5–4.5)
PLATELET # BLD AUTO: 261 K/UL — SIGNIFICANT CHANGE UP (ref 150–400)
POTASSIUM SERPL-MCNC: 4.2 MMOL/L — SIGNIFICANT CHANGE UP (ref 3.5–5.3)
POTASSIUM SERPL-SCNC: 4.2 MMOL/L — SIGNIFICANT CHANGE UP (ref 3.5–5.3)
PROT SERPL-MCNC: 6.6 G/DL — SIGNIFICANT CHANGE UP (ref 6–8.3)
PROTHROM AB SERPL-ACNC: 11.2 SEC — SIGNIFICANT CHANGE UP (ref 9.8–12.7)
RBC # BLD: 4.65 M/UL — SIGNIFICANT CHANGE UP (ref 4.2–5.8)
RBC # FLD: 13.2 % — SIGNIFICANT CHANGE UP (ref 10.3–14.5)
SODIUM SERPL-SCNC: 146 MMOL/L — HIGH (ref 135–145)
WBC # BLD: 13.51 K/UL — HIGH (ref 3.8–10.5)
WBC # FLD AUTO: 13.51 K/UL — HIGH (ref 3.8–10.5)

## 2018-10-30 PROCEDURE — 99233 SBSQ HOSP IP/OBS HIGH 50: CPT

## 2018-10-30 PROCEDURE — 99291 CRITICAL CARE FIRST HOUR: CPT

## 2018-10-30 RX ORDER — ENOXAPARIN SODIUM 100 MG/ML
40 INJECTION SUBCUTANEOUS EVERY 24 HOURS
Qty: 0 | Refills: 0 | Status: DISCONTINUED | OUTPATIENT
Start: 2018-10-31 | End: 2018-11-02

## 2018-10-30 RX ORDER — METOPROLOL TARTRATE 50 MG
25 TABLET ORAL EVERY 8 HOURS
Qty: 0 | Refills: 0 | Status: DISCONTINUED | OUTPATIENT
Start: 2018-10-30 | End: 2018-11-02

## 2018-10-30 RX ORDER — LOSARTAN POTASSIUM 100 MG/1
25 TABLET, FILM COATED ORAL DAILY
Qty: 0 | Refills: 0 | Status: DISCONTINUED | OUTPATIENT
Start: 2018-10-30 | End: 2018-11-02

## 2018-10-30 RX ORDER — DOCUSATE SODIUM 100 MG
100 CAPSULE ORAL THREE TIMES A DAY
Qty: 0 | Refills: 0 | Status: DISCONTINUED | OUTPATIENT
Start: 2018-10-30 | End: 2018-11-02

## 2018-10-30 RX ORDER — SENNA PLUS 8.6 MG/1
1 TABLET ORAL DAILY
Qty: 0 | Refills: 0 | Status: DISCONTINUED | OUTPATIENT
Start: 2018-10-30 | End: 2018-11-02

## 2018-10-30 RX ORDER — HYDRALAZINE HCL 50 MG
25 TABLET ORAL EVERY 12 HOURS
Qty: 0 | Refills: 0 | Status: DISCONTINUED | OUTPATIENT
Start: 2018-10-30 | End: 2018-10-31

## 2018-10-30 RX ORDER — POTASSIUM PHOSPHATE, MONOBASIC POTASSIUM PHOSPHATE, DIBASIC 236; 224 MG/ML; MG/ML
15 INJECTION, SOLUTION INTRAVENOUS ONCE
Qty: 0 | Refills: 0 | Status: COMPLETED | OUTPATIENT
Start: 2018-10-30 | End: 2018-10-30

## 2018-10-30 RX ADMIN — Medication 100 MILLIGRAM(S): at 21:33

## 2018-10-30 RX ADMIN — TAMSULOSIN HYDROCHLORIDE 0.4 MILLIGRAM(S): 0.4 CAPSULE ORAL at 21:32

## 2018-10-30 RX ADMIN — SENNA PLUS 1 TABLET(S): 8.6 TABLET ORAL at 21:33

## 2018-10-30 RX ADMIN — Medication 2: at 23:54

## 2018-10-30 RX ADMIN — ATORVASTATIN CALCIUM 40 MILLIGRAM(S): 80 TABLET, FILM COATED ORAL at 21:33

## 2018-10-30 RX ADMIN — Medication 100 MILLIGRAM(S): at 13:59

## 2018-10-30 RX ADMIN — CEFTRIAXONE 100 GRAM(S): 500 INJECTION, POWDER, FOR SOLUTION INTRAMUSCULAR; INTRAVENOUS at 15:42

## 2018-10-30 RX ADMIN — HEPARIN SODIUM 5000 UNIT(S): 5000 INJECTION INTRAVENOUS; SUBCUTANEOUS at 05:09

## 2018-10-30 RX ADMIN — Medication 25 MILLIGRAM(S): at 05:10

## 2018-10-30 RX ADMIN — TIOTROPIUM BROMIDE 1 CAPSULE(S): 18 CAPSULE ORAL; RESPIRATORY (INHALATION) at 07:09

## 2018-10-30 RX ADMIN — LOSARTAN POTASSIUM 25 MILLIGRAM(S): 100 TABLET, FILM COATED ORAL at 12:18

## 2018-10-30 RX ADMIN — Medication 81 MILLIGRAM(S): at 12:19

## 2018-10-30 RX ADMIN — BUDESONIDE AND FORMOTEROL FUMARATE DIHYDRATE 2 PUFF(S): 160; 4.5 AEROSOL RESPIRATORY (INHALATION) at 19:21

## 2018-10-30 RX ADMIN — Medication 25 MILLIGRAM(S): at 21:32

## 2018-10-30 RX ADMIN — Medication 25 MILLIGRAM(S): at 17:18

## 2018-10-30 RX ADMIN — CHLORHEXIDINE GLUCONATE 15 MILLILITER(S): 213 SOLUTION TOPICAL at 05:09

## 2018-10-30 RX ADMIN — Medication 25 MILLIGRAM(S): at 05:12

## 2018-10-30 RX ADMIN — Medication 25 MILLIGRAM(S): at 13:59

## 2018-10-30 RX ADMIN — CLOPIDOGREL BISULFATE 75 MILLIGRAM(S): 75 TABLET, FILM COATED ORAL at 12:19

## 2018-10-30 RX ADMIN — Medication 6: at 12:18

## 2018-10-30 RX ADMIN — BUDESONIDE AND FORMOTEROL FUMARATE DIHYDRATE 2 PUFF(S): 160; 4.5 AEROSOL RESPIRATORY (INHALATION) at 07:06

## 2018-10-30 RX ADMIN — Medication 40 MILLIGRAM(S): at 05:09

## 2018-10-30 RX ADMIN — INSULIN GLARGINE 6 UNIT(S): 100 INJECTION, SOLUTION SUBCUTANEOUS at 21:33

## 2018-10-30 RX ADMIN — POTASSIUM PHOSPHATE, MONOBASIC POTASSIUM PHOSPHATE, DIBASIC 62.5 MILLIMOLE(S): 236; 224 INJECTION, SOLUTION INTRAVENOUS at 10:12

## 2018-10-30 NOTE — PROGRESS NOTE ADULT - SUBJECTIVE AND OBJECTIVE BOX
[facility]    Saud Aguilar, Dani, Génesis, Medina, Carroll, Kumar      [phone]    CHIEF COMPLAINT: Patient is a 78y old  Male who presents with a chief complaint of Dyspnea (30 Oct 2018 07:46)      Follow Up: resp failure, rapid svt    Interim history: The patient reports no new symptoms.  Denies chest discomfort and shortness of breath.  No abdominal pain.  No new neurologic symptoms. 10 min rapid af yest janine noted      MEDICATIONS  (STANDING):  aspirin  chewable 81 milliGRAM(s) Oral daily  atorvastatin 40 milliGRAM(s) Oral at bedtime  buDESOnide 160 MICROgram(s)/formoterol 4.5 MICROgram(s) Inhaler 2 Puff(s) Inhalation two times a day  cefTRIAXone   IVPB 1 Gram(s) IV Intermittent every 24 hours  chlorhexidine 0.12% Liquid 15 milliLiter(s) Swish and Spit two times a day  clopidogrel Tablet 75 milliGRAM(s) Oral daily  docusate sodium 100 milliGRAM(s) Oral three times a day  hydrALAZINE 25 milliGRAM(s) Oral every 12 hours  insulin glargine Injectable (LANTUS) 6 Unit(s) SubCutaneous at bedtime  insulin lispro (HumaLOG) corrective regimen sliding scale   SubCutaneous every 6 hours  losartan 25 milliGRAM(s) Oral daily  metoprolol tartrate 25 milliGRAM(s) Oral every 8 hours  senna 1 Tablet(s) Oral daily  tamsulosin 0.4 milliGRAM(s) Oral at bedtime  tiotropium 18 MICROgram(s) Capsule 1 Capsule(s) Inhalation daily    MEDICATIONS  (PRN):  ALBUTerol/ipratropium for Nebulization 3 milliLiter(s) Nebulizer every 4 hours PRN Shortness of Breath and/or Wheezing      REVIEW OF SYSTEMS:  eye, ent, GI, , allergic, dermatologic, musculoskeletal and neurologic are negative except as described above    Vital Signs Last 24 Hrs  T(C): 37 (30 Oct 2018 12:20), Max: 37.5 (29 Oct 2018 12:53)  T(F): 98.6 (30 Oct 2018 12:20), Max: 99.5 (29 Oct 2018 12:53)  HR: 114 (30 Oct 2018 12:00) (97 - 124)  BP: 140/75 (30 Oct 2018 12:00) (124/66 - 166/79)  BP(mean): 100 (30 Oct 2018 12:00) (85 - 113)  RR: 34 (30 Oct 2018 12:00) (22 - 52)  SpO2: 97% (30 Oct 2018 12:00) (92% - 100%)    I&O's Summary    29 Oct 2018 07:01  -  30 Oct 2018 07:00  --------------------------------------------------------  IN: 409.2 mL / OUT: 1965 mL / NET: -1555.8 mL        Telemetry past 24h:    PHYSICAL EXAM:    Constitutional: well-nourished, well-developed, NAD   HEENT:  MMM, sclerae anicteric, conjunctivae clear, no oral cyanosis.  Pulmonary: Non-labored, breath sounds are decr bilaterally, ester wheezing   Cardiovascular: Regular, S1 and S2.  No murmur.  No rubs, gallops or clicks  Gastrointestinal: Bowel Sounds present, soft, nontender.   Lymph: No peripheral edema.   Neurological: Alert, no focal deficits  Skin: No rashes.  Psych:  Mood & affect appropriate    LABS: All Labs Reviewed:                        12.8   13.51 )-----------( 261      ( 30 Oct 2018 05:49 )             41.5                         11.3   10.53 )-----------( 235      ( 29 Oct 2018 06:45 )             36.4                         11.9   17.35 )-----------( 264      ( 28 Oct 2018 06:10 )             38.2     30 Oct 2018 05:49    146    |  104    |  28     ----------------------------<  139    4.2     |  37     |  1.20   29 Oct 2018 06:45    144    |  106    |  25     ----------------------------<  171    4.6     |  36     |  1.20   28 Oct 2018 06:10    143    |  103    |  22     ----------------------------<  182    4.9     |  34     |  0.97     Ca    9.3        30 Oct 2018 05:49  Ca    8.9        29 Oct 2018 06:45  Ca    9.9        28 Oct 2018 06:10  Phos  2.4       30 Oct 2018 05:49  Phos  2.7       29 Oct 2018 06:45  Phos  2.7       28 Oct 2018 06:10  Mg     2.1       30 Oct 2018 05:49  Mg     2.2       29 Oct 2018 06:45  Mg     2.5       28 Oct 2018 06:10    TPro  6.6    /  Alb  2.9    /  TBili  0.4    /  DBili  x      /  AST  20     /  ALT  54     /  AlkPhos  81     30 Oct 2018 05:49  TPro  5.9    /  Alb  2.6    /  TBili  0.3    /  DBili  x      /  AST  16     /  ALT  57     /  AlkPhos  76     29 Oct 2018 06:45  TPro  6.4    /  Alb  3.0    /  TBili  0.3    /  DBili  x      /  AST  24     /  ALT  72     /  AlkPhos  83     28 Oct 2018 06:10    PT/INR - ( 30 Oct 2018 05:49 )   PT: 11.2 sec;   INR: 1.03 ratio         PTT - ( 30 Oct 2018 05:49 )  PTT:37.1 sec      Blood Culture: Organism --  Gram Stain Blood -- Gram Stain   Few polymorphonuclear leukocytes per low power field  Moderate squamous epithelial cells per low power field  Numerous Gram positive cocci in pairs, chains and clusters  Specimen Source .Sputum Sputum - trap  Culture-Blood --    Organism --  Gram Stain Blood -- Gram Stain --  Specimen Source .Blood Blood  Culture-Blood --    Organism --  Gram Stain Blood -- Gram Stain --  Specimen Source .Blood Blood  Culture-Blood --    Organism --  Gram Stain Blood -- Gram Stain --  Specimen Source .Urine Catheterized  Culture-Blood --            RADIOLOGY:    EKG:    Echo:  < from: TTE Echo Doppler w/o Cont (10.26.18 @ 19:52) >     EXAM:  ECHO TTE W/O CON COMP W/DOPPLR         PROCEDURE DATE:  10/26/2018        INTERPRETATION:  Ordering Physician: RICKEY TRUJILLO    Indication: Cardiac arrest    Technician: BALDOMERO    Study Quality: Poor given that the patient was supine in the ICU   A complete echocardiographic study was performed utilizing standard   protocol including spectral and color Doppler in all echocardiographic   windows.    Height: 180 cm  Weight: 99 kg  Blood Pressure: 54/74    MEASUREMENTS  IVS: 0.99 cm  PWT: 0.78cm  LA: 3.4cm  AO: 2.9cm  LVIDd: Unable to measured given poor endocardial border definition  LVIDs: Unable to measure given poor endocardial border definition      LVEF: Visually estimated in the LV short axis view (best images of the   LV) is 55-60 %  RVSP: Unable to assess given lack of adequate TR waveform  RA Pressure: 15 mmHg  IVC: Dilated at 2.4 cm in diameter and collapses less than 50% with   inspiration (not valid if patient is intubated and on mechanical   ventilatory support)    FINDINGS  Left Ventricle: Over the left ventricle is poorly visualized. There was   best visualized in the LV short axis view. The visual estimation of the   ejection fraction is 55-60%. I'm unable to rule out regional wall motion   abnormalities given the poor acoustic windows.  Right Ventricle: Overall poorly visualized  Left Atrium: Grossly normal in size  Right Atrium: Overall poorly visualized  Mitral Valve: Normal in structure with trace mitral valve regurgitation  Aortic Valve: Trileaflet and sclerotic with no evidence of significant   insufficiency. No stenosis  Tricuspid Valve: Overall poorly visualized. There was trace tricuspid   valve regurgitation  Pulmonic Valve: Poorly visualized and poor Doppler interrogation  Diastolic Function: The LV diastolic function is indeterminate in this   study  Pericardium/Pleura: No significant pericardial or pleural effusions noted      CONCLUSIONS:  1. Normal technically limited study.  2. The left ventricle was best visualized in the shortaxis view. The LV   systolic function in that view appears preserved with a visually   estimated ejection fraction of 55-60%.  3. I'm unable to rule out regional wall motion abnormalities given the   poor endomyocardial border definition.  4. Normal mitral valve with trace regurgitation.  5. Sclerotic trileaflet aortic valve with no insufficiency or stenosis.  6. Poorly visualized right-sided chambers.  7. Limited study to assess pulmonary artery systolic pressure.                  MAC TAYLOR   This document has been electronically signed. Oct 27 2018 11:31AM                < end of copied text >

## 2018-10-30 NOTE — PROGRESS NOTE ADULT - SUBJECTIVE AND OBJECTIVE BOX
Patient is a 78y old  Male who presents with a chief complaint of Respiratory Distress (28 Oct 2018 08:41)    Follow up for resp distress: Pt. seen and evaluated for s/p PEA cardiac arrest and acute on chronic respiratory failure.     INTERVAL HPI: Pt seen and examined bedside, has no complaints. extubated on 10/29 and AAOx3. denies any SOB, CP, With O2 NC. feeling better.     OVERNIGHT EVENTS:  ICU Vital Signs Last 24 Hrs  T(C): 37 (30 Oct 2018 16:02), Max: 37.4 (29 Oct 2018 20:20)  T(F): 98.6 (30 Oct 2018 16:02), Max: 99.3 (29 Oct 2018 20:20)  HR: 96 (30 Oct 2018 17:00) (94 - 124)  BP: 139/72 (30 Oct 2018 17:00) (124/66 - 166/79)  BP(mean): 99 (30 Oct 2018 17:00) (85 - 114)  ABP: --  ABP(mean): --  RR: 34 (30 Oct 2018 17:00) (22 - 52)  SpO2: 100% (30 Oct 2018 17:00) (92% - 100%)        REVIEW OF SYSTEM:    Constitutional: No fever, chills, fatigue  Neuro: No headache, numbness, weakness  Resp: No cough, wheezing, shortness of breath  CVS: No chest pain, palpitations, leg swelling  GI: No abdominal pain, nausea, vomiting, diarrhea   : No dysuria, frequency, incontinence  Skin: No itching, burning, rashes, or lesions   Msk: No joint pain or swelling  Psych: No depression, anxiety, mood swings          PHYSICAL EXAM:  GENERAL: NAD, well-developed with O2 nc  HEAD:  Atraumatic, Normocephalic  EYES: EOMI, PERRLA, conjunctiva and sclera clear  NECK: Supple, No JVD, Normal thyroid  HEART: Regular rate and rhythm; No murmurs, rubs, or gallops  RESPIRATORY: CTA B/L, occ wheezing, no rhonchi  ABDOMEN: Soft, Nontender, Nondistended; Bowel sounds present  NEUROLOGY: A&Ox3, nonfocal, moving all extremities.  EXTREMITIES:  2+ Peripheral Pulses, No clubbing, cyanosis, or edema  SKIN: warm, dry, normal color, no rash or abnormal lesions        LABS:                        12.8   13.51 )-----------( 261      ( 30 Oct 2018 05:49 )             41.5     30 Oct 2018 05:49    146    |  104    |  28     ----------------------------<  139    4.2     |  37     |  1.20     Ca    9.3        30 Oct 2018 05:49  Phos  2.4       30 Oct 2018 05:49  Mg     2.1       30 Oct 2018 05:49    TPro  6.6    /  Alb  2.9    /  TBili  0.4    /  DBili  x      /  AST  20     /  ALT  54     /  AlkPhos  81     30 Oct 2018 05:49    PT/INR - ( 30 Oct 2018 05:49 )   PT: 11.2 sec;   INR: 1.03 ratio         PTT - ( 30 Oct 2018 05:49 )  PTT:37.1 sec    CAPILLARY BLOOD GLUCOSE      POCT Blood Glucose.: 253 mg/dL (30 Oct 2018 11:31)  POCT Blood Glucose.: 132 mg/dL (30 Oct 2018 05:16)  POCT Blood Glucose.: 143 mg/dL (30 Oct 2018 00:27)  POCT Blood Glucose.: 210 mg/dL (29 Oct 2018 18:13)    UCx       RADIOLOGY & ADDITIONAL TESTS:    MEDICATIONS  (STANDING):  aspirin  chewable 81 milliGRAM(s) Oral daily  atorvastatin 40 milliGRAM(s) Oral at bedtime  buDESOnide 160 MICROgram(s)/formoterol 4.5 MICROgram(s) Inhaler 2 Puff(s) Inhalation two times a day  cefTRIAXone   IVPB 1 Gram(s) IV Intermittent every 24 hours  chlorhexidine 0.12% Liquid 15 milliLiter(s) Swish and Spit two times a day  clopidogrel Tablet 75 milliGRAM(s) Oral daily  heparin  Injectable 5000 Unit(s) SubCutaneous every 8 hours  hydrALAZINE 25 milliGRAM(s) Oral every 8 hours  insulin glargine Injectable (LANTUS) 6 Unit(s) SubCutaneous at bedtime  insulin lispro (HumaLOG) corrective regimen sliding scale   SubCutaneous every 6 hours  methylPREDNISolone sodium succinate Injectable 40 milliGRAM(s) IV Push daily  pantoprazole  Injectable 40 milliGRAM(s) IV Push daily  tamsulosin 0.4 milliGRAM(s) Oral at bedtime  tiotropium 18 MICROgram(s) Capsule 1 Capsule(s) Inhalation daily    MEDICATIONS  (PRN):  ALBUTerol/ipratropium for Nebulization 3 milliLiter(s) Nebulizer every 4 hours PRN Shortness of Breath and/or Wheezing

## 2018-10-30 NOTE — PROGRESS NOTE ADULT - ASSESSMENT
79 yo M with Hx asthma, COPD, chronic hypoxemic respiratory failure, CAD with CABG presented with worsening respiratory distress, admited 10/26 with SOB for 2 days with status asthmaticus but  found to have severe hypercapnic respiratory failure, initially improved on BiPAP. Hospital Course was then complicated by bradycardic PEA arrest resulting anoxic encephalopathy and MERRILL.    Neuro:  -Alert and oriented, following commands s/p extubation 10/29  -CT Head (10/28): Negative    Cardio:  -HD Stable  -TTE performed-EF 55-60%  -Continue aspirin & plavix (for CAD and PVD)  -continued antihypertensives--on diovan at home, will hold off on ACE/ARB in setting of MERRILL recovery, no BB secondary to asthma  -continue on hydralazine 25mg oral every 8 hours with hold parameters for hypotension  -A-line dc'd 10/29  -Central line dc'd 10/29    Pulm:  -Treatment regimen for status asthmaticus--decreased dose of methylprednisone to 40 mg qd, will continue to taper  -continue symbicort, spiriva, albuterol  -Extubated 10/29.   -Supplemental O2 as needed    Endocrine  -Serum glucose level 139  -ISS  -Long-acting Insulin    Renal:  -Resolved MERRILL  -Avoid nephrotoxins  -Cunha for strict intake and output monitoring  -Continue Flomax  -Hypophosphatemia *****    GI  -Tolerating feeds  -Protonix    ID  -Rocephin day 5/5?****  -Azithro completed 10/28  -Sputum Cx (10/27): Normal respiratory shayy  -BCx (10/26): NGTD (10/29)  -UCx (10/26): NGF    Prophylaxis:  -Protonix  -Heparin    Tubes/Lines  -cunha 79 yo M with Hx asthma, COPD, chronic hypoxemic respiratory failure, CAD with CABG presented with worsening respiratory distress, admited 10/26 with SOB for 2 days with status asthmaticus but  found to have severe hypercapnic respiratory failure, initially improved on BiPAP. Hospital Course was then complicated by bradycardic PEA arrest resulting anoxic encephalopathy and MERRILL.    Neuro:  -Alert and oriented, following commands s/p extubation 10/29  -CT Head (10/28): Negative    Cardio:  -HD Stable  -TTE performed-EF 55-60%  -Continue aspirin & plavix (for CAD and PVD)  -continued antihypertensives--on diovan at home, will hold off on ACE/ARB in setting of MERRILL recovery, no BB secondary to asthma  -continue on hydralazine 25mg oral every 8 hours with hold parameters for hypotension  -A-line dc'd 10/29  -Central line dc'd 10/29    Pulm:  -Treatment regimen for status asthmaticus--decreased dose of methylprednisone to 40 mg qd, will continue to taper  -continue symbicort, spiriva, albuterol  -Extubated 10/29.   -Supplemental O2 as needed    Endocrine  -Serum glucose level 139  -ISS  -Long-acting Insulin    Renal:  -Resolved MERRILL  -Avoid nephrotoxins  -Cunha for strict intake and output monitoring  -Continue Flomax  -Hypophosphatemia *****    GI  -FU Speech and swallow eval****  -Will advance diet per speech and swallow*****  -Protonix    ID  -Rocephin day 5/5?****  -Azithro completed 10/28  -Sputum Cx (10/27): Normal respiratory shayy  -BCx (10/26): NGTD (10/29)  -UCx (10/26): NGF    Prophylaxis:  -Protonix  -Heparin    Tubes/Lines  -cunha 79 yo M with Hx asthma, COPD, chronic hypoxemic respiratory failure, CAD with CABG presented with worsening respiratory distress, admitted 10/26 with SOB for 2 days with status asthmaticus but  found to have severe hypercapnic respiratory failure, initially improved on BiPAP. Hospital Course was then complicated by bradycardic PEA arrest resulting anoxic encephalopathy and EMRRILL.    Neuro:  -Alert and oriented, following commands s/p extubation 10/29  -CT Head (10/28): Negative    Cardio:  -HD Stable  -TTE performed-EF 55-60%  -Continue aspirin & plavix (for CAD and PVD)  -continue antihypertensives, will resume Losartan 25 mg (MERRILL has resolved)  -continue on hydralazine 25mg oral every 12 hours with hold parameters for hypotension  -A-line dc'd 10/29  -Central line dc'd 10/29  -Continue metoprolo 25mg q8h per cardio  -if rapid svt/afib arrhythmia recurs, may benefit from amiodarone per cardio    Pulm:  -Treatment regimen for status asthmaticus--continue dose of methylprednisone to 40 mg qd, will continue to taper  -continue symbicort, spiriva, albuterol  -Extubated 10/29.   -Supplemental O2 as needed    Endocrine  -Serum glucose level 139  -ISS  -Long-acting Insulin    Renal:  -Resolved MERRILL  -Avoid nephrotoxins  -DC cunha  -Continue Flomax  -Hypophosphatemia, repleted    GI  -Diet advanced per speech and swallow recs  -Will start bowel regimen (senna/colace)  -Protonix    ID  -Rocephin day 5/5  -Azithro completed 10/28  -Sputum Cx (10/27): Normal respiratory shayy  -BCx (10/26): NGTD (10/29)  -UCx (10/26): NGF    MSK:  -PT/OT    Prophylaxis:  -Protonix  -Lovenox (MERRILL resolved)    Tubes/Lines  -cunha     Dispo:  -transfer to [facility] likely tomorrow (10/31)

## 2018-10-30 NOTE — PHYSICAL THERAPY INITIAL EVALUATION ADULT - IMPAIRED TRANSFERS: SIT/STAND, REHAB EVAL
narrow base of support/impaired coordination/impaired balance/cognition/decreased flexibility/impaired postural control/decreased strength

## 2018-10-30 NOTE — PHYSICAL THERAPY INITIAL EVALUATION ADULT - PRECAUTIONS/LIMITATIONS, REHAB EVAL
cardiac precautions/oxygen therapy device and L/min/fall precautions/Max , Uses O2 at night 2 L since ~ Jan. 2018

## 2018-10-30 NOTE — PROGRESS NOTE ADULT - ATTENDING COMMENTS
78M PMH Asthma/COPD with chronic hypoxic respiratory failure on home oxygen, HTN, HLD, CAD, PVD, and DM2 who presents with acute hypoxic and hypercapnic respiratory failure due to status asthmaticus complicated by PEA cardiac arrest s/p ACLS with ROSC, now recovering. ICU course complicated by SVT resolved with metoprolol    - Improved neurologic status, continue physical therapy  - HD stable. No further SVT. Increase metoprolol to 25 mg q8h. Start losartan 25 mg qd. Taper off hydralazine  - CAD, continue aspirin, plavix, and statin  - Resolved status asthmaticus. Change steroids to prednisone 40 mg daily with slow taper to home dose of 2.5 mg qd  - Continue ICS/LABA/LAMA  - Advance diet per speech and swallow  - MERRILL improved, strict I/O's, trend kidney function and lytes, d/c cunha  - S/p 5 days ceftriaxone and 3 day azithromycin  - DVT ppx with enoxaparin  - Continue lantus + insulin coverage scale, improved hyperglycemia  - DIscussed with patient and wife at bedside, full code  CC time spent: 35 min

## 2018-10-30 NOTE — PHYSICAL THERAPY INITIAL EVALUATION ADULT - IMPAIRMENTS CONTRIBUTING TO GAIT DEVIATIONS, PT EVAL
decreased strength/impaired balance decreased strength/narrow base of support/impaired postural control/decreased flexibility/impaired coordination/impaired balance/cognition

## 2018-10-30 NOTE — PHYSICAL THERAPY INITIAL EVALUATION ADULT - ADDITIONAL COMMENTS
Pt lives in a private home /c his spouse with 15 steps to enter /c no rails and plus 6 steps /c 1 handrail. Pt has 12 steps inside to bedroom/bath. PTA pt was independent with ambulation and ADL's w/o AD. Pt on 2L O2 at home at night only not for functional mobility. Pt also owns a cane. Pt drives and did not have any trouble /c functional mobility.

## 2018-10-30 NOTE — CHART NOTE - NSCHARTNOTEFT_GEN_A_CORE
Assessment/Follow Up: Pt awake/alert at visit; wife at bedside. Extubated 10/29, NGT removed. NPO, awaiting SLP evaluation. Per wife regular texture Consistent Carb, heart healthy diet at home. Usually good appetite/po intake. Past HgbA1c 6.2%(wdl). No report N/V. Loose brown stool 10/29. Will remain available for diet initiation pending SLP evaluation. Pt/pts spouse with no dietary questions/concerns.     Factors impacting intake: [ ] none [ ] nausea  [ ] vomiting [ ] diarrhea [ ] constipation  [ ]chewing problems [ ] swallowing issues  [x ] other: NPO awaiting SLP evaluation    Diet Presciption: NPO      Current Weight: Weight (kg): 98 (10-26 @ 11:31)      Pertinent Medications: MEDICATIONS  (STANDING):  aspirin  chewable 81 milliGRAM(s) Oral daily  atorvastatin 40 milliGRAM(s) Oral at bedtime  buDESOnide 160 MICROgram(s)/formoterol 4.5 MICROgram(s) Inhaler 2 Puff(s) Inhalation two times a day  cefTRIAXone   IVPB 1 Gram(s) IV Intermittent every 24 hours  chlorhexidine 0.12% Liquid 15 milliLiter(s) Swish and Spit two times a day  clopidogrel Tablet 75 milliGRAM(s) Oral daily  heparin  Injectable 5000 Unit(s) SubCutaneous every 8 hours  hydrALAZINE 25 milliGRAM(s) Oral every 8 hours  insulin glargine Injectable (LANTUS) 6 Unit(s) SubCutaneous at bedtime  insulin lispro (HumaLOG) corrective regimen sliding scale   SubCutaneous every 6 hours  methylPREDNISolone sodium succinate Injectable 40 milliGRAM(s) IV Push daily  metoprolol tartrate 25 milliGRAM(s) Oral every 12 hours  potassium phosphate IVPB 15 milliMole(s) IV Intermittent once  tamsulosin 0.4 milliGRAM(s) Oral at bedtime  tiotropium 18 MICROgram(s) Capsule 1 Capsule(s) Inhalation daily    MEDICATIONS  (PRN):  ALBUTerol/ipratropium for Nebulization 3 milliLiter(s) Nebulizer every 4 hours PRN Shortness of Breath and/or Wheezing    Pertinent Labs: 10-30 Na146 mmol/L<H> Glu 139 mg/dL<H> K+ 4.2 mmol/L Cr  1.20 mg/dL BUN 28 mg/dL<H> 10-30 Phos 2.4 mg/dL<L> 10-30 Alb 2.9 g/dL<L> 10-27 [id number] 6.2 %<H>     CAPILLARY BLOOD GLUCOSE      POCT Blood Glucose.: 132 mg/dL (30 Oct 2018 05:16)  POCT Blood Glucose.: 143 mg/dL (30 Oct 2018 00:27)  POCT Blood Glucose.: 210 mg/dL (29 Oct 2018 18:13)  POCT Blood Glucose.: 311 mg/dL (29 Oct 2018 12:02)    Skin: intact. Joe 16.     Estimated Needs:   [x ] no change since previous assessment  [ ] recalculated:     Previous Nutrition Diagnosis:   [ x] Increased nutrient needs - ongoing   [x ] Altered nutrition related labs- ongoing; sliding scale insulin coverage provided, HbgA1c wdl.       New Nutrition Diagnosis: [x ] Inadequate oral intake related to decreased ability to consume sufficient energy as evidenced by NPO status s/p extubation 10/29, awaiting SLP evaluation.       Interventions:   Recommend  [X ] Change Diet To: Consistent Carbohydrate, Dash/TLC diet w/ recommendations per SLP on diet texture/liquid consistency.   [ ] Nutrition Supplement  [ ] Nutrition Support  [x ] Other: MVI w/ mineral daily. Electrolyte replacement prn.     Monitoring and Evaluation:   [x ] PO intake [ x ] Tolerance to diet prescription [ x ] weights [ x ] labs[ x ] follow up per protocol  [ ] other:

## 2018-10-30 NOTE — SWALLOW BEDSIDE ASSESSMENT ADULT - COMMENTS
78 year old male with a PMHx COPD, Asthma, Diabetes, Chronic Hypoxemic Respiratory Failure, CAD who was brought to the ED by EMS because of SOB.   Pt A and 0 x 4, communicates wants/ needs, seen c Pt's spouse present bedside  Pt c slow mastication of solids, timely trigger of swallow c clear breath sounds pre/post deglutition via cervical auscultation

## 2018-10-30 NOTE — OCCUPATIONAL THERAPY INITIAL EVALUATION ADULT - PERTINENT HX OF CURRENT PROBLEM, REHAB EVAL
77 y/o male admitted with acute respiratory failure w/hypercapnia and s/p cardiac arrest. 77 y/o male admitted w/acute respiratory failure w/hypercapnia and s/p cardiac arrest. PMHx COPD, Asthma, DM, Chronic Hypoxemic Respiratory Failure, CAD (s/p CABG) who was brought to the ED by EMS on 10/26 due to SOB. Prior to 10/26 the pt endorsed a cough and has become increasingly SOB. He continued to worsen and his spouse called EMS when he went to the bathroom and couldn't get off the toilet. CT Head (-) neuro event.

## 2018-10-30 NOTE — PROGRESS NOTE ADULT - SUBJECTIVE AND OBJECTIVE BOX
Patient is a 78y old  Male who presents with a chief complaint of Dyspnea (29 Oct 2018 17:07)    24 hour events: Pt extubated 10/29. OG tube, central line, A-line dc'd 10/29. Pt clinically improving.    REVIEW OF SYSTEMS*******  Constitutional: No fever, chills, fatigue  Neuro: No headache, numbness, weakness  Resp: No cough, wheezing, shortness of breath  CVS: No chest pain, palpitations, leg swelling  GI: No abdominal pain, nausea, vomiting, diarrhea   : No dysuria, frequency, incontinence  Skin: No itching, burning, rashes, or lesions   Msk: No joint pain or swelling  Psych: No depression, anxiety, mood swings  Heme: No bleeding    T(F): 99.2 (10-30-18 @ 07:29), Max: 100 (10-29-18 @ 08:20)  HR: 106 (10-30-18 @ 07:00) (9 - 124)  BP: 166/79 (10-30-18 @ 07:00) (124/66 - 166/79)  RR: 35 (10-30-18 @ 07:00) (20 - 52)  SpO2: 99% (10-30-18 @ 07:00) (92% - 100%)  Wt(kg): --    Mode: CPAP with PS, FiO2: 30, PEEP: 5, PS: 5  CAPILLARY BLOOD GLUCOSE    POCT Blood Glucose.: 132 mg/dL (30 Oct 2018 05:16)  POCT Blood Glucose.: 143 mg/dL (30 Oct 2018 00:27)  POCT Blood Glucose.: 210 mg/dL (29 Oct 2018 18:13)  POCT Blood Glucose.: 311 mg/dL (29 Oct 2018 12:02)    I&O's Summary    10-29 @ 07:01  -  10-30 @ 07:00  --------------------------------------------------------  IN: 409.2 mL / OUT: 1965 mL / NET: -1555.8 mL      PHYSICAL EXAM  Constitutional: Appears comfortable  HEENT: pupils 2mm equal and reacting to light  Neck: supple  Cardiovascular: Regular rate & rhythm, +S1/S2  Respiratory: expiratory wheezes****, no rales or rhonchi  Gastrointestinal: soft, non-tender, non distended, bowel sounds present x4  Extremities; No edema noted, no cyanosis or no clubbing,  Vascular: bilateral pedal pulse +2, capillary refill < 2 seconds  Neurological: Neuro intact, no deficits  Skin: warm, dry, normal skin turgor    MEDICATIONS  cefTRIAXone   IVPB IV Intermittent  hydrALAZINE Oral  metoprolol tartrate Oral  tamsulosin Oral  atorvastatin Oral  insulin glargine Injectable (LANTUS) SubCutaneous  insulin lispro (HumaLOG) corrective regimen sliding scale SubCutaneous  methylPREDNISolone sodium succinate Injectable IV Push  ALBUTerol/ipratropium for Nebulization Nebulizer PRN  buDESOnide 160 MICROgram(s)/formoterol 4.5 MICROgram(s) Inhaler Inhalation  tiotropium 18 MICROgram(s) Capsule Inhalation  aspirin  chewable Oral  clopidogrel Tablet Oral  heparin  Injectable SubCutaneous  pantoprazole  Injectable IV Push  chlorhexidine 0.12% Liquid Swish and Spit                 12.8   13.51 )-----------( 261      ( 30 Oct 2018 05:49 )             41.5       10-30    146<H>  |  104  |  28<H>  ----------------------------<  139<H>  4.2   |  37<H>  |  1.20    Ca    9.3      30 Oct 2018 05:49  Phos  2.4     10-30  Mg     2.1     10-30    TPro  6.6  /  Alb  2.9<L>  /  TBili  0.4  /  DBili  x   /  AST  20  /  ALT  54  /  AlkPhos  81  10-3      PT/INR - ( 30 Oct 2018 05:49 )   PT: 11.2 sec;   INR: 1.03 ratio         PTT - ( 30 Oct 2018 05:49 )  PTT:37.1 sec    .Sputum Sputum - trap   Normal Respiratory Maria Esther present   Few polymorphonuclear leukocytes per low power field  Moderate squamous epithelial cells per low power field  Numerous Gram positive cocci in pairs, chains and clusters 10-27 @ 10:23  .Blood Blood   No growth to date. -- 10-26 @ 20:03  .Blood Blood   No growth to date. -- 10-26 @ 20:01  .Urine Catheterized   No growth -- 10-26 @ 19:53    Rapid RVP Result: NotDetec (10-26 @ 10:58)    Radiology: ***  Bedside lung ultrasound: ***  Bedside ECHO: ***    CENTRAL LINE: N  SHARMA: Yes                  A-LINE: N    GLOBAL ISSUE/BEST PRACTICE:  Analgesia: n/a  Sedation x  CAM-ICU: LINA  HOB elevation: yes  Stress ulcer prophylaxis: Pantoprazole 40mg IVP twice daily  VTE prophylaxis: he eryn 5000 units SQ every 8 hours  Glycemic control: blood glucose monitoring every 6 hours, insulin coverage  Nutrition:x    CODE STATUS: Full Code Patient is a 78y old  Male who presents with a chief complaint of Dyspnea (29 Oct 2018 17:07)    24 hour events: Pt extubated 10/29. OG tube, central line, A-line dc'd 10/29. Pt experienced an episode of SVT/rapid Afib 10/29 around 1830h for about 10 minutes that resolved with metoprolol. Pt clinically improving.     REVIEW OF SYSTEMS  Constitutional: No fever, chills, fatigue  Neuro: No headache, numbness, weakness  Resp: No cough, wheezing, shortness of breath  CVS: No chest pain, palpitations, leg swelling  GI: No abdominal pain, nausea, vomiting, diarrhea   : No dysuria, frequency, incontinence  Skin: No itching, burning, rashes, or lesions   Msk: No joint pain or swelling  Psych: No depression, anxiety    T(F): 99.2 (10-30-18 @ 07:29), Max: 100 (10-29-18 @ 08:20)  HR: 106 (10-30-18 @ 07:00) (9 - 124)  BP: 166/79 (10-30-18 @ 07:00) (124/66 - 166/79)  RR: 35 (10-30-18 @ 07:00) (20 - 52)  SpO2: 99% (10-30-18 @ 07:00) (92% - 100%)  Wt(kg): --    Mode: CPAP with PS, FiO2: 30, PEEP: 5, PS: 5  CAPILLARY BLOOD GLUCOSE    POCT Blood Glucose.: 132 mg/dL (30 Oct 2018 05:16)  POCT Blood Glucose.: 143 mg/dL (30 Oct 2018 00:27)  POCT Blood Glucose.: 210 mg/dL (29 Oct 2018 18:13)  POCT Blood Glucose.: 311 mg/dL (29 Oct 2018 12:02)    I&O's Summary    10-29 @ 07:01  -  10-30 @ 07:00  --------------------------------------------------------  IN: 409.2 mL / OUT: 1965 mL / NET: -1555.8 mL      PHYSICAL EXAM  Constitutional: Appears comfortable, sitting up in chair, AAOx3  HEENT: pupils 2mm equal and reacting to light  Neck: supple  Cardiovascular: Regular rate & rhythm, +S1/S2  Respiratory: expiratory wheezes, but improved from yesterday, no rales or rhonchi  Gastrointestinal: soft, non-tender, non distended, bowel sounds present x4  Extremities; No edema noted, no cyanosis or no clubbing,  Vascular: bilateral pedal pulse +2, capillary refill < 2 seconds  Neurological: Neuro intact, no deficits  Skin: warm, dry, normal skin turgor    MEDICATIONS  cefTRIAXone   IVPB IV Intermittent  hydrALAZINE Oral  metoprolol tartrate Oral  tamsulosin Oral  atorvastatin Oral  insulin glargine Injectable (LANTUS) SubCutaneous  insulin lispro (HumaLOG) corrective regimen sliding scale SubCutaneous  methylPREDNISolone sodium succinate Injectable IV Push  ALBUTerol/ipratropium for Nebulization Nebulizer PRN  buDESOnide 160 MICROgram(s)/formoterol 4.5 MICROgram(s) Inhaler Inhalation  tiotropium 18 MICROgram(s) Capsule Inhalation  aspirin  chewable Oral  clopidogrel Tablet Oral  heparin  Injectable SubCutaneous  pantoprazole  Injectable IV Push  chlorhexidine 0.12% Liquid Swish and Spit                 12.8   13.51 )-----------( 261      ( 30 Oct 2018 05:49 )             41.5       10-30    146<H>  |  104  |  28<H>  ----------------------------<  139<H>  4.2   |  37<H>  |  1.20    Ca    9.3      30 Oct 2018 05:49  Phos  2.4     10-30  Mg     2.1     10-30    TPro  6.6  /  Alb  2.9<L>  /  TBili  0.4  /  DBili  x   /  AST  20  /  ALT  54  /  AlkPhos  81  10-3      PT/INR - ( 30 Oct 2018 05:49 )   PT: 11.2 sec;   INR: 1.03 ratio         PTT - ( 30 Oct 2018 05:49 )  PTT:37.1 sec    .Sputum Sputum - trap   Normal Respiratory Maria Esther present   Few polymorphonuclear leukocytes per low power field  Moderate squamous epithelial cells per low power field  Numerous Gram positive cocci in pairs, chains and clusters 10-27 @ 10:23  .Blood Blood   No growth to date. -- 10-26 @ 20:03  .Blood Blood   No growth to date. -- 10-26 @ 20:01  .Urine Catheterized   No growth -- 10-26 @ 19:53    Rapid RVP Result: NotDetec (10-26 @ 10:58)    Radiology: x  Bedside lung ultrasound: x  Bedside ECHO: x    CENTRAL LINE: N  SHARMA: Yes                A-LINE: N    GLOBAL ISSUE/BEST PRACTICE:  Analgesia: n/a  Sedation x  CAM-ICU: LINA  HOB elevation: yes  Stress ulcer prophylaxis: Pantoprazole 40mg IVP twice daily  VTE prophylaxis: he eryn 5000 units SQ every 8 hours  Glycemic control: blood glucose monitoring every 6 hours, insulin coverage  Nutrition:x    CODE STATUS: Full Code

## 2018-10-30 NOTE — OCCUPATIONAL THERAPY INITIAL EVALUATION ADULT - ADDITIONAL COMMENTS
Pt lives in a private home with 15 steps to enter with no handrails plus 6 steps with 1 handrail. Pt has 12 steps inside to bedroom/bath. PTA pt was independent with ambulation and ADL's. Pt on 2L O2 at home at night only (since Jan 2018). Pt owns a cane. Pt drives and did not have any trouble with functional mobility prior to hospital admission. Pt lives in a private home with 15 steps to enter with no handrails plus 6 steps with 1 handrail. Pt has 12 steps inside to bedroom/bath. PTA pt was independent with ambulation and ADL's. Pt on 2L O2 at home at night only (since Jan 2018). Pt owns a cane. Pt has a stall shower with 1 grab bar. Pt drives and did not have any trouble with functional mobility prior to hospital admission. Pt appears mildly confused and exhibits decreased cognitive processing (delayed response time).

## 2018-10-30 NOTE — PHYSICAL THERAPY INITIAL EVALUATION ADULT - TRANSFER SAFETY CONCERNS NOTED: SIT/STAND, REHAB EVAL
decreased safety awareness/losing balance/decreased balance during turns/decreased sequencing ability/decreased weight-shifting ability/tactile, manual and verbal cues needed, excessive retropulsion in standing, leans to right/decreased proprioception/decreased step length

## 2018-10-30 NOTE — PHYSICAL THERAPY INITIAL EVALUATION ADULT - GENERAL OBSERVATIONS, REHAB EVAL
Pt rec'd in recliner in ICU /c spouse present, NAD, VSS, +2LO2 via NC, agreeable to PT. Pt is noted to have some delayed response time and processing time.

## 2018-10-30 NOTE — OCCUPATIONAL THERAPY INITIAL EVALUATION ADULT - TRANSFER TRAINING, PT EVAL
Patient will transfer to toilet with DME as needed with minimal assistance in 2-4 sessions. Patient will transfer to bedside chair with DME as needed with moderate assistance in 2-4 sessions.

## 2018-10-30 NOTE — PHYSICAL THERAPY INITIAL EVALUATION ADULT - GAIT DEVIATIONS NOTED, PT EVAL
decreased ara/increased time in double stance/decreased stride length/decreased weight-shifting ability/decreased step length increased time in double stance/decreased velocity of limb motion/decreased step length/decreased weight-shifting ability/decreased ara/decreased stride length

## 2018-10-30 NOTE — PROGRESS NOTE ADULT - ASSESSMENT
79 yo M with Hx asthma, COPD, chronic hypoxemic respiratory failure, CAD with CABG presented with worsening respiratory distress, admitted 10/26 with SOB for 2 days with status asthmaticus but  found to have severe hypercapnic respiratory failure, initially improved on BiPAP. Hospital Course was then complicated by bradycardic PEA arrest resulting anoxic encephalopathy and MERRILL.

## 2018-10-30 NOTE — OCCUPATIONAL THERAPY INITIAL EVALUATION ADULT - BED MOBILITY TRAINING, PT EVAL
Patient will perform bed mobility skills (supine to sit and sit to supine) with minimal assistance in 2-4 sessions. Patient will perform bed mobility skills (supine to sit and sit to supine) with maximal assistance in 2-4 sessions.

## 2018-10-30 NOTE — PHYSICAL THERAPY INITIAL EVALUATION ADULT - GAIT TRAINING, PT EVAL
GOAL: Pt will ambulate 200ft independently with least restrictive AD in 4 weeks. GOAL: Pt will ambulate 25'-50' or as tolerated /c Min ax1 and chair follow in 7 sessions

## 2018-10-30 NOTE — PHYSICAL THERAPY INITIAL EVALUATION ADULT - LEVEL OF INDEPENDENCE: STAND/SIT, REHAB EVAL
independent tactile, manual and verbal cues needed, excessive retropulsion in standing, leans to right/moderate assist (50% patients effort)/maximum assist (25% patients effort)

## 2018-10-30 NOTE — PHYSICAL THERAPY INITIAL EVALUATION ADULT - TRANSFER TRAINING, PT EVAL
GOAL: Pt will complete all transfers independently w/o AD in 4 weeks GOAL: Pt will perform sit<->stand transfers /c Min ax1 or better in 5 sessions

## 2018-10-30 NOTE — PHYSICAL THERAPY INITIAL EVALUATION ADULT - GAIT DISTANCE, PT EVAL
x2 trials/75 feet 1-2 side steps, pt not weight shifting onto RW, retroleans and very unsteady. flexed knees

## 2018-10-30 NOTE — OCCUPATIONAL THERAPY INITIAL EVALUATION ADULT - OCCUPATION
Retired (worked at [employer]) Retired (professor at [employer]; taught political science)

## 2018-10-30 NOTE — OCCUPATIONAL THERAPY INITIAL EVALUATION ADULT - RANGE OF MOTION EXAMINATION, UPPER EXTREMITY
Fine motor skills appear minimally-moderately impaired due to impaired cognition and decreased dexterity b/l hands. Pt is right hand dominant./bilateral UE Active ROM was WFL  (within functional limits)

## 2018-10-30 NOTE — PHYSICAL THERAPY INITIAL EVALUATION ADULT - IMPAIRMENTS FOUND, PT EVAL
gait, locomotion, and balance/aerobic capacity/endurance/muscle strength posture/gait, locomotion, and balance/arousal, attention, and cognition/gross motor/poor safety awareness/aerobic capacity/endurance/fine motor/muscle strength

## 2018-10-30 NOTE — PROGRESS NOTE ADULT - ASSESSMENT
77 yo M with asthma, COPD, chronic hypoxemic respiratory failure, CAD with CABG in 2004 presents with worsening respiratory distress over a few days.  Found to have severe hypercapnic respiratory failure, initially improved on BiPAP.  Transferred to ICU and had bradycardic/PEA arrest.   Etiology of his arrest presumably respiratory in etiology  Has not had recent cardiac issues and reports a normal stress test in 8/2018.   LV function normal.    -there is no evidence of acute ischemia.  -mild trop elevation in the setting of cardiac/resp arrest  -low suspicion for acs  -cont asa  -cont plavix  -cont statin  -cont bb    -there is recurrence of significant arrhythmia thus far.  -rapid svt, ?af yesterday, @~200. lasted about 10 min  -if recurs may benefit from amiodarone    -no clear vol ol    -bp adequately controlled  -cont losartan  -cont hydralazine    The patient is at risk of abrupt decompensation.  35 minutes of critical care time was spent with this patient.

## 2018-10-30 NOTE — PHYSICAL THERAPY INITIAL EVALUATION ADULT - PERTINENT HX OF CURRENT PROBLEM, REHAB EVAL
As per H&P:"Pt is a 78 year old male with a PMHx COPD, Asthma, Diabetes, Chronic Hypoxemic Respiratory Failure, CAD (s/p CABG) who was brought to the ED by EMS because of SOB. Over the last 4 days the patient endorsed a cough and has become increasingly SOB. He continued to worsen and refused to come to the hospital yesterday (10/25) when his wife suggested it. This AM he went to the bathroom and was so SOB that he could not get off the toilet at which point his wife called EMS."

## 2018-10-30 NOTE — OCCUPATIONAL THERAPY INITIAL EVALUATION ADULT - GENERAL OBSERVATIONS, REHAB EVAL
Patient seated upright in bedside chair with +IV, +telemonitor, 2 liters O2 via nc. Pt was intubated 10/26-10/29/18.

## 2018-10-30 NOTE — PROGRESS NOTE ADULT - SUBJECTIVE AND OBJECTIVE BOX
Neurology Follow up note    YUE [id number]    HPI:  Pt is a 78 year old male with a PMHx COPD, Asthma, Diabetes, Chronic Hypoxemic Respiratory Failure, CAD (s/p CABG) who was brought to the ED by EMS because of SOB. Over the last 4 days the patient endorsed a cough and has become increasingly SOB. He continued to worsen and refused to come to the hospital yesterday (10/25) when his wife suggested it. This AM he went to the bathroom and was so SOB that he could not get off the toilet at which point his wife called EMS.    In the ED, pt was noted to be in respiratory distress likely secondary to hypercapnia. Bipap was started and nebulizer treatments, iv steroid given were given. ICU was consulted. (26 Oct 2018 10:37)      Interval History - doing better    Patient is seen, chart was reviewed and case was discussed with the treatment team.  Pt is not in any distress.   Lying on bed comfortably.   No events reported overnight.   No clinical seizure was reported.  Sitting on chair bed comfortably.    is at bedside.    Vital Signs Last 24 Hrs  T(C): 37 (30 Oct 2018 16:02), Max: 37.4 (29 Oct 2018 20:20)  T(F): 98.6 (30 Oct 2018 16:02), Max: 99.3 (29 Oct 2018 20:20)  HR: 96 (30 Oct 2018 17:00) (94 - 124)  BP: 139/72 (30 Oct 2018 17:00) (124/66 - 166/79)  BP(mean): 99 (30 Oct 2018 17:00) (85 - 114)  RR: 34 (30 Oct 2018 17:00) (22 - 52)  SpO2: 100% (30 Oct 2018 17:00) (92% - 100%)          On Neurological Examination:    Mental Status - Pt is alert, awake, oriented X3.   Mood and affect  normal    Speech - dysarthria.    Cranial Nerves - Pupils 3 mm equal and reactive to light, extraocular eye movements intact. Pt has no visual field deficit.  Pt has no facial asymmetry. Facial sensation is intact.Tongue - is in midline.    Muscle tone - is normal    Motor Exam - 5/5 of UE.'  LE 3-4/5,    Sensory Exam -  Pt withdraws all extremities equally on stimulation. No asymmetry seen.    coordination:    Deep tendon Reflexes - 2 plus all over.          Neck Supple -  Yes.   MEDICATIONS    ALBUTerol/ipratropium for Nebulization 3 milliLiter(s) Nebulizer every 4 hours PRN  aspirin  chewable 81 milliGRAM(s) Oral daily  atorvastatin 40 milliGRAM(s) Oral at bedtime  buDESOnide 160 MICROgram(s)/formoterol 4.5 MICROgram(s) Inhaler 2 Puff(s) Inhalation two times a day  chlorhexidine 0.12% Liquid 15 milliLiter(s) Swish and Spit two times a day  clopidogrel Tablet 75 milliGRAM(s) Oral daily  docusate sodium 100 milliGRAM(s) Oral three times a day  hydrALAZINE 25 milliGRAM(s) Oral every 12 hours  insulin glargine Injectable (LANTUS) 6 Unit(s) SubCutaneous at bedtime  insulin lispro (HumaLOG) corrective regimen sliding scale   SubCutaneous every 6 hours  losartan 25 milliGRAM(s) Oral daily  metoprolol tartrate 25 milliGRAM(s) Oral every 8 hours  senna 1 Tablet(s) Oral daily  tamsulosin 0.4 milliGRAM(s) Oral at bedtime  tiotropium 18 MICROgram(s) Capsule 1 Capsule(s) Inhalation daily      Allergies    No Known Allergies    Intolerances    shellfish (Nausea)      LABS:  CBC Full  -  ( 30 Oct 2018 05:49 )  WBC Count : 13.51 K/uL  Hemoglobin : 12.8 g/dL  Hematocrit : 41.5 %  Platelet Count - Automated : 261 K/uL  Mean Cell Volume : 89.2 fl  Mean Cell Hemoglobin : 27.5 pg  Mean Cell Hemoglobin Concentration : 30.8 gm/dL  Auto Neutrophil # : 9.43 K/uL  Auto Lymphocyte # : 2.44 K/uL  Auto Monocyte # : 1.45 K/uL  Auto Eosinophil # : 0.07 K/uL  Auto Basophil # : 0.01 K/uL  Auto Neutrophil % : 69.8 %  Auto Lymphocyte % : 18.1 %  Auto Monocyte % : 10.7 %  Auto Eosinophil % : 0.5 %  Auto Basophil % : 0.1 %      10-30    146<H>  |  104  |  28<H>  ----------------------------<  139<H>  4.2   |  37<H>  |  1.20    Ca    9.3      30 Oct 2018 05:49  Phos  2.4     10-30  Mg     2.1     10-30    TPro  6.6  /  Alb  2.9<L>  /  TBili  0.4  /  DBili  x   /  AST  20  /  ALT  54  /  AlkPhos  81  10-30    Hemoglobin A1C:     Vitamin B12     RADIOLOGY    ASSESSMENT AND PLAN:      HYPOXIC ENCEPHALOPATHY IMPROVING MENTAL STATUS,  SP CARDIA ARREST.    CONTINUE NEBULIZER,  SWALLOWING EVAL.  Physical therapy evaluation.  OOB to chair/ambulation with assistance only.  Pain is accessed and addressed.  Plan of care was discussed with family. Questions answered.  Would continue to follow.

## 2018-10-30 NOTE — PROGRESS NOTE ADULT - SUBJECTIVE AND OBJECTIVE BOX
COMMODORE TURNER [facility] P    ALLERGY Shellfish  CONTACT Sp Amira C    REASON FOR VISIT   Initial evaluation/Pulmonary consultation requested  10/28/2018  from Dr Dejesus   Patient examined chart reviewed  HOSPITAL ADMISSION [facility] Dr Melba Sidhu 10/26/2018   PATIENT CAME  FROM (if information available)              VITALS/LABS                           10/30/2018 afeb 95 130/70 34 100%   10/30/2018 W 13.5 Hb 12.8 Plt 261 INR 1 Na 146 K 4.2 CO2 37 Cr 1.2     REVIEW OF SYMPTOMS    Able to give ROS  Yes     RELIABLE No   CONSTITUTIONAL Weakness Yes  Chills No Vision changes No  ENDOCRINE No unexplained hair loss No heat or cold intolerance    ALLERGY No hives  Sore throat No   RESP Coughing blood no  Shortness of breath YES   NEURO No Headache  Confusion Pain neck No   CARDIAC No Chest pain No Palpitations   GI No Pain abdomen NO   Vomiting NO     PHYSICAL EXAM    HEENT Unremarkable PERRLA atraumatic   RESP Fair air entry EXP prolonged    Harsh breath sound Resp distres mild   CARDIAC S1 S2 No S3     NO JVD    ABDOMEN SOFT BS PRESENT NOT DISTENDED No hepatosplenomegaly PEDAL EDEMA present No calf tenderness  NO rash   GENERAL Not TOXIC looking    GLOBAL ISSUE/BEST PRACTICE:      PROBLEM: HOB elevation:   y            PROBLEM: Stress ulcer proph:    protonix 40 (10/26)                       PROBLEM: VTE prophylaxis:      HSC (10/26) ?  Lovenox 40 (10/30)  PROBLEM: PNEUMONIA PPX Chlorhexidine .12%    PROBLEM: Glycemic control:    na  PROBLEM: Nutrition:    pulmocare 1680 (10/27)   PROBLEM: Advanced directive: na     PROBLEM: Allergies:  shellfish  PROCEDURE 10/30/2018 Gonzáles DCed  PROCEDURE 10/29/2018 EXtubation  PROCEDURE 10/28/2018 Gonzáles (u retn)   PROCEDURE 10/26/2018 A line   PROCEDURE 10/26/2018 C line RIJ  PROCEDURE 10/26/2018 G tube   PEROCEDURE 10/26/2018 ET intubation    PATIENT DESCRIPTION 10/26/2018  ADMISSION [facility] P DR DU SIDHU  77 y/o male pmhx HTN, PVD, DM, Asthma, COPD on 2L home O2, hx CABGx3, HLD presented to ED w/ SOB and increased work of breathing. Pt admitted w/ acute on chronic hypercapnic respiratory failure due to status asthmaticus and hyperglycemia. Subsequently suffered a cardiac arrest (radycardic/PEA arrest. ) in ICU, ICU course was marked with anoxic encephalopathy s/p cardiac arrest  targeted temperature management goal 36C  required heavy sedation due to respiratory status, sedated with propofol and ketamine  ICU course marked  w/ MERRILL, lactic acidosis, and hyperkalemia.  Pulm consulted 10/28/2018     PATIENT MANAGEMENT   10/26/2018  ADMISSION [facility] P DR DU SIDHU    CARDIAC ARREST 10/26/2018 LIKELY SEC TO RESP FAILURE   Hemodynamics remained stable Possible anoxic encephaslopathy   TARGETED TEMPERATURE THERAPY POST CAC 10/26/2018 10/28 CT H n Good neurologic recovery noted   INTUBATION MECHANICAL VENT 10/26-10/29/2018 Extubated 10/29/2018   IV SEDATIVE DRIP FOR MECHANICAL VENTILATION  Propofol changed to DEX 10/28/2018 for weaning   COPD Managed with BD steroids    HYPERGLYCEMIA REQUIRED INSULIN DRIO 10/26-10/28/2018   MERRILL NONOLIGURIC 10/26/2018 Resolved  with supp care     PROBLEM/ASSESSMENT/PLAN    CAD  10/26/2018 ECHO ef 55%   ASA 81 (10/28)   Plavix 75 (10/28)   Atorvastat 40 (10/28)   ASSESSMENT/RECOMMENDATIONS    PROBLEM/ASSESSMENT/PLAN    HYPERTENSION  Hydralazine 25.3  ASSESSMENT/RECOMMENDATIONS      PROBLEM/ASSESSMENT/PLAN    RESP TRACT INFECTION   10/26-10/27-10/28 W 15.3-18.7-17.3   MICROBIO   10/27 Sp c N fl   10/26 bc n   10/26  u c n   10/26 Leg n   10/26 RVP n   ABIO   Rocephin (10/26-10/30)   ASSESSMENT/RECOMMENDATIONS  Cont Rx    PROBLEM/ASSESSMENT/PLAN    ACUTE COMBINED O2 CO2  RESP FAILURE SEC COPD EX   10/29/2018 Extubated  10/28/2018 AC 12/500/5/.3   10/28/2018 737/56/100  ASSESSMENT/RECOMMENDATIONS  Wean as tolerated     PROBLEM/ASSESSMENT/PLAN    COPD EX   Solumed 40.2 (10/28) ? Solumed 40 (10/29) ? Pred 40.5d (10/30)  Symbicort 160 (10/30)   spiriva (10/30)   Duoneb.6 (10/27)   ASSESSMENT/RECOMMENDATIONS  Consider adding inhaled cs      PROBLEM/ASSESSMENT/PLAN    CHF  Hydralazine 25.2 (10/30)   metoprolol 25.3 (10/30)   Losartan 25 (10/30)   ASSESSMENT/RECOMMENDATIONS  Cont Rx       TIME SPENT Over 25 minutes aggregate care time spent on encounter; activities included   direct patient care, counseling and/or coordinating care reviewing notes, lab data/ imaging , discussion with multidisciplinary team/ patient  /family. Risks, benefits, alternatives  discussed in detail.

## 2018-10-31 LAB
ALBUMIN SERPL ELPH-MCNC: 2.7 G/DL — LOW (ref 3.3–5)
ALP SERPL-CCNC: 75 U/L — SIGNIFICANT CHANGE UP (ref 40–120)
ALT FLD-CCNC: 51 U/L — SIGNIFICANT CHANGE UP (ref 12–78)
ANION GAP SERPL CALC-SCNC: 6 MMOL/L — SIGNIFICANT CHANGE UP (ref 5–17)
AST SERPL-CCNC: 23 U/L — SIGNIFICANT CHANGE UP (ref 15–37)
BASOPHILS # BLD AUTO: 0.01 K/UL — SIGNIFICANT CHANGE UP (ref 0–0.2)
BASOPHILS NFR BLD AUTO: 0.1 % — SIGNIFICANT CHANGE UP (ref 0–2)
BILIRUB SERPL-MCNC: 0.4 MG/DL — SIGNIFICANT CHANGE UP (ref 0.2–1.2)
BUN SERPL-MCNC: 25 MG/DL — HIGH (ref 7–23)
CALCIUM SERPL-MCNC: 8.8 MG/DL — SIGNIFICANT CHANGE UP (ref 8.5–10.1)
CHLORIDE SERPL-SCNC: 103 MMOL/L — SIGNIFICANT CHANGE UP (ref 96–108)
CO2 SERPL-SCNC: 37 MMOL/L — HIGH (ref 22–31)
CREAT SERPL-MCNC: 1.2 MG/DL — SIGNIFICANT CHANGE UP (ref 0.5–1.3)
CULTURE RESULTS: SIGNIFICANT CHANGE UP
CULTURE RESULTS: SIGNIFICANT CHANGE UP
EOSINOPHIL # BLD AUTO: 0.29 K/UL — SIGNIFICANT CHANGE UP (ref 0–0.5)
EOSINOPHIL NFR BLD AUTO: 3.2 % — SIGNIFICANT CHANGE UP (ref 0–6)
GLUCOSE SERPL-MCNC: 104 MG/DL — HIGH (ref 70–99)
HCT VFR BLD CALC: 41.5 % — SIGNIFICANT CHANGE UP (ref 39–50)
HGB BLD-MCNC: 12.8 G/DL — LOW (ref 13–17)
IMM GRANULOCYTES NFR BLD AUTO: 0.9 % — SIGNIFICANT CHANGE UP (ref 0–1.5)
INR BLD: 0.98 RATIO — SIGNIFICANT CHANGE UP (ref 0.88–1.16)
LYMPHOCYTES # BLD AUTO: 2.36 K/UL — SIGNIFICANT CHANGE UP (ref 1–3.3)
LYMPHOCYTES # BLD AUTO: 25.8 % — SIGNIFICANT CHANGE UP (ref 13–44)
MAGNESIUM SERPL-MCNC: 1.8 MG/DL — SIGNIFICANT CHANGE UP (ref 1.6–2.6)
MCHC RBC-ENTMCNC: 27.5 PG — SIGNIFICANT CHANGE UP (ref 27–34)
MCHC RBC-ENTMCNC: 30.8 GM/DL — LOW (ref 32–36)
MCV RBC AUTO: 89.2 FL — SIGNIFICANT CHANGE UP (ref 80–100)
MONOCYTES # BLD AUTO: 0.93 K/UL — HIGH (ref 0–0.9)
MONOCYTES NFR BLD AUTO: 10.2 % — SIGNIFICANT CHANGE UP (ref 2–14)
NEUTROPHILS # BLD AUTO: 5.48 K/UL — SIGNIFICANT CHANGE UP (ref 1.8–7.4)
NEUTROPHILS NFR BLD AUTO: 59.8 % — SIGNIFICANT CHANGE UP (ref 43–77)
PHOSPHATE SERPL-MCNC: 2.9 MG/DL — SIGNIFICANT CHANGE UP (ref 2.5–4.5)
PLATELET # BLD AUTO: 265 K/UL — SIGNIFICANT CHANGE UP (ref 150–400)
POTASSIUM SERPL-MCNC: 3.8 MMOL/L — SIGNIFICANT CHANGE UP (ref 3.5–5.3)
POTASSIUM SERPL-SCNC: 3.8 MMOL/L — SIGNIFICANT CHANGE UP (ref 3.5–5.3)
PROT SERPL-MCNC: 6.3 G/DL — SIGNIFICANT CHANGE UP (ref 6–8.3)
PROTHROM AB SERPL-ACNC: 11.2 SEC — SIGNIFICANT CHANGE UP (ref 10–12.9)
RBC # BLD: 4.65 M/UL — SIGNIFICANT CHANGE UP (ref 4.2–5.8)
RBC # FLD: 13 % — SIGNIFICANT CHANGE UP (ref 10.3–14.5)
SODIUM SERPL-SCNC: 146 MMOL/L — HIGH (ref 135–145)
SPECIMEN SOURCE: SIGNIFICANT CHANGE UP
SPECIMEN SOURCE: SIGNIFICANT CHANGE UP
WBC # BLD: 9.15 K/UL — SIGNIFICANT CHANGE UP (ref 3.8–10.5)
WBC # FLD AUTO: 9.15 K/UL — SIGNIFICANT CHANGE UP (ref 3.8–10.5)

## 2018-10-31 PROCEDURE — 99233 SBSQ HOSP IP/OBS HIGH 50: CPT

## 2018-10-31 PROCEDURE — 99233 SBSQ HOSP IP/OBS HIGH 50: CPT | Mod: GC

## 2018-10-31 RX ORDER — INSULIN LISPRO 100/ML
VIAL (ML) SUBCUTANEOUS
Qty: 0 | Refills: 0 | Status: DISCONTINUED | OUTPATIENT
Start: 2018-10-31 | End: 2018-11-02

## 2018-10-31 RX ADMIN — BUDESONIDE AND FORMOTEROL FUMARATE DIHYDRATE 2 PUFF(S): 160; 4.5 AEROSOL RESPIRATORY (INHALATION) at 21:47

## 2018-10-31 RX ADMIN — Medication 8: at 12:20

## 2018-10-31 RX ADMIN — TAMSULOSIN HYDROCHLORIDE 0.4 MILLIGRAM(S): 0.4 CAPSULE ORAL at 21:46

## 2018-10-31 RX ADMIN — Medication 2: at 17:23

## 2018-10-31 RX ADMIN — Medication 25 MILLIGRAM(S): at 04:52

## 2018-10-31 RX ADMIN — Medication 25 MILLIGRAM(S): at 21:56

## 2018-10-31 RX ADMIN — BUDESONIDE AND FORMOTEROL FUMARATE DIHYDRATE 2 PUFF(S): 160; 4.5 AEROSOL RESPIRATORY (INHALATION) at 06:53

## 2018-10-31 RX ADMIN — Medication 25 MILLIGRAM(S): at 12:21

## 2018-10-31 RX ADMIN — Medication 81 MILLIGRAM(S): at 12:21

## 2018-10-31 RX ADMIN — Medication 0: at 06:52

## 2018-10-31 RX ADMIN — Medication 25 MILLIGRAM(S): at 04:51

## 2018-10-31 RX ADMIN — ATORVASTATIN CALCIUM 40 MILLIGRAM(S): 80 TABLET, FILM COATED ORAL at 21:46

## 2018-10-31 RX ADMIN — Medication 40 MILLIGRAM(S): at 07:37

## 2018-10-31 RX ADMIN — ENOXAPARIN SODIUM 40 MILLIGRAM(S): 100 INJECTION SUBCUTANEOUS at 12:21

## 2018-10-31 RX ADMIN — CLOPIDOGREL BISULFATE 75 MILLIGRAM(S): 75 TABLET, FILM COATED ORAL at 12:21

## 2018-10-31 RX ADMIN — Medication 2: at 21:56

## 2018-10-31 RX ADMIN — INSULIN GLARGINE 6 UNIT(S): 100 INJECTION, SOLUTION SUBCUTANEOUS at 21:57

## 2018-10-31 RX ADMIN — TIOTROPIUM BROMIDE 1 CAPSULE(S): 18 CAPSULE ORAL; RESPIRATORY (INHALATION) at 06:53

## 2018-10-31 NOTE — PROGRESS NOTE ADULT - SUBJECTIVE AND OBJECTIVE BOX
[facility] P    ALLERGY Shellfish  CONTACT Sp Amira C      VITALS/LABS                           10/31/2018 afeb 109 120/70 20 96%   10/31/2018 W 9.1 Hb 12.8 Plt 265 Na 146 K 3.8 CO2 37 Cr 1.2     REVIEW OF SYMPTOMS    Able to give ROS  Yes     RELIABLE No   CONSTITUTIONAL Weakness Yes  Chills No Vision changes No  ENDOCRINE No unexplained hair loss No heat or cold intolerance    ALLERGY No hives  Sore throat No   RESP Coughing blood no  Shortness of breath YES   NEURO No Headache  Confusion Pain neck No   CARDIAC No Chest pain No Palpitations   GI No Pain abdomen NO   Vomiting NO     PHYSICAL EXAM    HEENT Unremarkable PERRLA atraumatic   RESP Fair air entry EXP prolonged    Harsh breath sound Resp distres mild   CARDIAC S1 S2 No S3     NO JVD    ABDOMEN SOFT BS PRESENT NOT DISTENDED No hepatosplenomegaly PEDAL EDEMA present No calf tenderness  NO rash   GENERAL Not TOXIC looking    GLOBAL ISSUE/BEST PRACTICE:      PROBLEM: HOB elevation:   y            PROBLEM: Stress ulcer proph:    protonix 40 (10/26)                       PROBLEM: VTE prophylaxis:      HSC (10/26) ?  Lovenox 40 (10/30)  PROBLEM: PNEUMONIA PPX Chlorhexidine .12%    PROBLEM: Glycemic control:    na  PROBLEM: Nutrition:    pulmocare 1680 (10/27)   PROBLEM: Advanced directive: na     PROBLEM: Allergies:  shellfish  PROCEDURE 10/30/2018 Gonzáles DCed  PROCEDURE 10/29/2018 EXtubation  PROCEDURE 10/28/2018 Gonzáles (u retn)   PROCEDURE 10/26/2018 A line   PROCEDURE 10/26/2018 C line RIJ  PROCEDURE 10/26/2018 G tube   PEROCEDURE 10/26/2018 ET intubation    PATIENT DESCRIPTION 10/26/2018  ADMISSION [facility] P DR DU FREEMAN  79 y/o male pmhx HTN, PVD, DM, Asthma, COPD on 2L home O2, hx CABGx3, HLD presented to ED w/ SOB and increased work of breathing. Pt admitted w/ acute on chronic hypercapnic respiratory failure due to status asthmaticus and hyperglycemia. Subsequently suffered a cardiac arrest (radycardic/PEA arrest. ) in ICU, ICU course was marked with anoxic encephalopathy s/p cardiac arrest  targeted temperature management goal 36C  required heavy sedation due to respiratory status, sedated with propofol and ketamine  ICU course marked  w/ MERRILL, lactic acidosis, and hyperkalemia.  Pulm consulted 10/28/2018     PATIENT MANAGEMENT   10/26/2018  ADMISSION [facility] P DR DU FREEMAN    CARDIAC ARREST 10/26/2018 LIKELY SEC TO RESP FAILURE   Hemodynamics remained stable Possible anoxic encephaslopathy   TARGETED TEMPERATURE THERAPY POST CAC 10/26/2018 10/28 CT H n Good neurologic recovery noted   INTUBATION MECHANICAL VENT 10/26-10/29/2018 Extubated 10/29/2018   IV SEDATIVE DRIP FOR MECHANICAL VENTILATION  Propofol changed to DEX 10/28/2018 for weaning   COPD Managed with BD steroids  Rxed LABA LAMA ICS   HYPERGLYCEMIA REQUIRED INSULIN DRIO 10/26-10/28/2018   MERRILL NONOLIGURIC 10/26/2018 Resolved  with supp care     TIME SPENT Over 25 minutes aggregate care time spent on encounter; activities included   direct patient care, counseling and/or coordinating care reviewing notes, lab data/ imaging , discussion with multidisciplinary team/ patient  /family. Risks, benefits, alternatives  discussed in detail.

## 2018-10-31 NOTE — PROGRESS NOTE ADULT - ATTENDING COMMENTS
78M PMH Asthma/COPD with chronic hypoxic respiratory failure on home oxygen, HTN, HLD, CAD, PVD, and DM2 who presents with acute hypoxic and hypercapnic respiratory failure due to status asthmaticus complicated by PEA cardiac arrest s/p ACLS with ROSC, now recovering. ICU course complicated by SVT resolved with metoprolol.    - Improved neurologic status, continue physical therapy  - HD stable. No further SVT. Continue metoprolol to 25 mg q8h. Continue losartan 25 mg qd. D/c hydralazine  - CAD, continue aspirin, plavix, and statin  - Resolved status asthmaticus. Very slow prednisone taper to home dose of 2.5 mg qd  - Continue ICS/LABA/LAMA. Start nocturnal bilevel PAP for chronic hypercapnic respiratory failure, will try to arrange for home Bipap  - Advance diet per speech and swallow  - MERRILL improved, strict I/O's, trend kidney function and lytes, d/c cunha  - S/p 5 days ceftriaxone and 3 day azithromycin  - DVT ppx with enoxaparin  - Continue lantus + insulin coverage scale, improved hyperglycemia  - DIscussed with patient and wife at bedside, full code  CC time spent: 35 min 78M PMH Asthma/COPD with chronic hypoxic respiratory failure on home oxygen, HTN, HLD, CAD, PVD, and DM2 who presents with acute hypoxic and hypercapnic respiratory failure due to status asthmaticus complicated by PEA cardiac arrest s/p ACLS with ROSC, now recovering. ICU course complicated by SVT resolved with metoprolol.    - Improved neurologic status, continue physical therapy  - HD stable. No further SVT. Continue metoprolol to 25 mg q8h. Continue losartan 25 mg qd. D/c hydralazine  - CAD, continue aspirin, plavix, and statin  - Resolved status asthmaticus. Very slow prednisone taper to home dose of 2.5 mg qd  - Continue ICS/LABA/LAMA. Start nocturnal bilevel PAP for chronic hypercapnic respiratory failure, will try to arrange for home Bipap  - Tolerating diet well  - MERRILL improved, strict I/O's, trend kidney function and lytes, no cunha  - S/p 5 days ceftriaxone and 3 day azithromycin  - DVT ppx with enoxaparin  - Continue lantus + insulin coverage scale, improved hyperglycemia  - DIscussed with patient and wife at bedside, full code

## 2018-10-31 NOTE — PROGRESS NOTE ADULT - ASSESSMENT
infectious diseases progress note:    MADDIE MALLOY is a 74y y. o. Male patient    Patient less alert and not very verbal, wife reports significant change in mental status after receiving higher dose baclofen yesterday    Allergies    Vaseline (Pruritus; Urticaria)    Intolerances        ANTIBIOTICS/RELEVANT:  antimicrobials    immunologic:  influenza   Vaccine 0.5 milliLiter(s) IntraMuscular once    OTHER:  acetaminophen  IVPB. 1000 milliGRAM(s) IV Intermittent once  aspirin  chewable 81 milliGRAM(s) Oral daily  BACItracin   Ointment 1 Application(s) Topical three times a day  baclofen 10 milliGRAM(s) Oral once  baclofen 20 milliGRAM(s) Oral three times a day  bisacodyl Suppository 10 milliGRAM(s) Rectal daily PRN  chlorhexidine 0.12% Liquid 15 milliLiter(s) Swish and Spit four times a day  digoxin     Tablet 0.125 milliGRAM(s) Oral daily  heparin  Injectable 5000 Unit(s) SubCutaneous every 8 hours  insulin glargine Injectable (LANTUS) 12 Unit(s) SubCutaneous at bedtime  insulin lispro (HumaLOG) corrective regimen sliding scale   SubCutaneous Before meals and at bedtime  insulin lispro Injectable (HumaLOG) 7 Unit(s) SubCutaneous three times a day before meals  lidocaine   Patch 1 Patch Transdermal daily  metoprolol     tartrate 25 milliGRAM(s) Oral two times a day  multivitamin 1 Tablet(s) Oral daily  pantoprazole  Injectable 40 milliGRAM(s) IV Push daily  polyethylene glycol 3350 17 Gram(s) Oral two times a day PRN  predniSONE   Tablet 10 milliGRAM(s) Oral daily  saliva substitute (BIOTENE MOISTURIZING MOUTH SPRAY) 1 [place](s) Topical three times a day  senna Syrup 10 milliLiter(s) Oral at bedtime PRN  sodium chloride 0.65% Nasal 1 Spray(s) Both Nostrils three times a day  sodium chloride 0.9%. 500 milliLiter(s) IV Continuous <Continuous>  tiotropium 18 MICROgram(s) Capsule 1 Capsule(s) Inhalation daily      Objective:  Last 24-Vital Signs Last 24 Hrs  T(C): 36.4 (30 Nov 2017 08:28), Max: 37.2 (29 Nov 2017 22:10)  T(F): 97.6 (30 Nov 2017 08:28), Max: 99 (29 Nov 2017 22:10)  HR: 71 (30 Nov 2017 08:28) (71 - 103)  BP: 116/68 (30 Nov 2017 08:28) (103/64 - 127/68)  BP(mean): --  RR: 18 (30 Nov 2017 08:28) (18 - 18)  SpO2: 99% (30 Nov 2017 08:28) (95% - 99%)    T(C): 36.4 (11-30-17 @ 08:28), Max: 37.2 (11-29-17 @ 22:10)  T(F): 97.6 (11-30-17 @ 08:28), Max: 99 (11-29-17 @ 22:10)  T(C): 36.4 (11-30-17 @ 08:28), Max: 37.2 (11-29-17 @ 22:10)  T(F): 97.6 (11-30-17 @ 08:28), Max: 99 (11-29-17 @ 22:10)  T(C): 36.4 (11-30-17 @ 08:28), Max: 37.4 (11-26-17 @ 15:52)  T(F): 97.6 (11-30-17 @ 08:28), Max: 99.4 (11-26-17 @ 15:52)    PHYSICAL EXAM:  Constitutional:Well-developed, well nourished  Eyes:PERRLA, EOMI  Ear/Nose/Throat: oropharynx normal	  Neck:no JVD, no lymphadenopathy, supple  Respiratory: no accessory muscle use, lung fields bilaterally decreased BS in bases with some crackles  Cardiovascular:RRR, normal S1, S2 no m/r/g  Gastrointestinal:soft, tender-suprapubic, no HSM, BS-normal  Extremities:no clubbing, no cyanosis, edema absent  Neuro-patient less alert, sig change  Skin-no sig lesions      LABS:                        7.4    5.76  )-----------( 165      ( 29 Nov 2017 07:19 )             23.2       WBC 5.76  11-29 @ 07:19  WBC 7.8  11-28 @ 14:38  WBC 8.19  11-28 @ 08:55  WBC 7.63  11-27 @ 08:45  WBC 5.54  11-26 @ 09:29  WBC 4.73  11-25 @ 07:54  WBC 5.42  11-24 @ 08:41  WBC 5.45  11-23 @ 09:54      11-29    137  |  99  |  29<H>  ----------------------------<  102<H>  5.5<H>   |  29  |  0.83    Ca    8.7      29 Nov 2017 23:08    Urine Microscopic-Add On (NC) (11.30.17 @ 00:45)    Red Blood Cell - Urine: 5-10 /HPF    White Blood Cell - Urine: >50 /HPF    Bacteria: Few /HPF    Epithelial Cells: Occasional /HPF    Urinalysis (11.30.17 @ 00:45)    pH Urine: 7.5    Glucose Qualitative, Urine: 500    Blood, Urine: Small    Color: Yellow    Urine Appearance: SL Turbid    Bilirubin: Negative    Ketone - Urine: Negative    Specific Gravity: 1.013    Protein, Urine: 30 mg/dL    Urobilinogen: Negative    Nitrite: Negative    Leukocyte Esterase Concentration: Large            MICROBIOLOGY:        RADIOLOGY & ADDITIONAL STUDIES: 77 yo M with Hx asthma, COPD, chronic hypoxemic respiratory failure, CAD with CABG presented with worsening respiratory distress, admitted 10/26 with SOB for 2 days with status asthmaticus but  found to have severe hypercapnic respiratory failure, initially improved on BiPAP. Hospital Course was then complicated by bradycardic PEA arrest resulting anoxic encephalopathy and MERRILL.    Neuro:  -Alert and oriented, following commands s/p extubation 10/29  -CT Head (10/28): Negative    Cardio:  -HD Stable  -TTE performed-EF 55-60%  -Continue aspirin & plavix (for CAD and PVD)  -continue antihypertensives, will resume Losartan 25 mg (MERRILL has resolved)  -continue on hydralazine 25mg oral every 12 hours with hold parameters for hypotension  -A-line dc'd 10/29  -Central line dc'd 10/29  -Continue metoprolol 25mg q8h per cardio  -if rapid svt/afib arrhythmia recurs, may benefit from amiodarone per cardio    Pulm:  -Treatment regimen for status asthmaticus--continue dose of methylprednisone to 40 mg qd, will continue to taper****  -continue symbicort, spiriva, albuterol  -Extubated 10/29.   -Supplemental O2 as needed    Endocrine  -ISS  -Long-acting Insulin    Renal:  -Resolved MERRILL  -Avoid nephrotoxins  -DC cunha  -Continue Flomax  -Hypophosphatemia, repleted    GI  -Diet advanced per speech and swallow recs  -Will start bowel regimen (senna/colace)  -Protonix    ID  -Rocephin day 5/5  -Azithro completed 10/28  -Sputum Cx (10/27): Normal respiratory shayy  -BCx (10/26): NGTD (10/29)  -UCx (10/26): NGF    MSK:  -PT/OT    Prophylaxis:  -Protonix  -Lovenox (MERRILL resolved)    Tubes/Lines  -cunha     Dispo:  -transfer to [facility] likely today (10/31) 79 yo M with Hx asthma, COPD, chronic hypoxemic respiratory failure, CAD with CABG presented with worsening respiratory distress, admitted 10/26 with SOB for 2 days with status asthmaticus but  found to have severe hypercapnic respiratory failure, initially improved on BiPAP. Hospital Course was then complicated by bradycardic PEA arrest resulting anoxic encephalopathy and MERRILL.    Neuro:  -Alert and oriented, following commands s/p extubation 10/29  -CT Head (10/28): Negative    Cardio:  -HD Stable, but soft BPs  -TTE performed-EF 55-60%  -Continue aspirin & plavix (for CAD and PVD)  -continue antihypertensives, will resume Losartan 25 mg (MERRILL has resolved)  -Will dc hydralazine per cards  -A-line dc'd 10/29  -Central line dc'd 10/29  -Continue metoprolol 25mg q8h per cardio  -if rapid svt/afib arrhythmia recurs, may benefit from amiodarone per cardio    Pulm:  -Treatment regimen for status asthmaticus--continue dose of methylprednisone to 40 mg qd, will continue to taper****  -continue symbicort, spiriva, albuterol  -Extubated 10/29.   -Supplemental O2 as needed    Endocrine  -ISS  -Long-acting Insulin    Renal:  -Resolved MERRILL  -Avoid nephrotoxins  -DC cunha  -Continue Flomax  -Hypophosphatemia, repleted    GI  -Diet advanced per speech and swallow recs  -Will start bowel regimen (senna/colace)  -Protonix    ID  -Rocephin day 5/5  -Azithro completed 10/28  -Sputum Cx (10/27): Normal respiratory shayy  -BCx (10/26): NGTD (10/29)  -UCx (10/26): NGF    MSK:  -PT/OT    Prophylaxis:  -Protonix  -Lovenox (MERRILL resolved)    Tubes/Lines  -cunha     Dispo:  -transfer to [facility] likely today (10/31) 79 yo M with Hx asthma, COPD, chronic hypoxemic respiratory failure, CAD with CABG presented with worsening respiratory distress, admitted 10/26 with SOB for 2 days with status asthmaticus but  found to have severe hypercapnic respiratory failure, initially improved on BiPAP. Hospital Course was then complicated by bradycardic PEA arrest resulting anoxic encephalopathy and MERRILL.    Neuro:  -Alert and oriented, following commands s/p extubation 10/29  -CT Head (10/28): Negative    Cardio:  -HD Stable, but soft BPs  -TTE performed-EF 55-60%  -Continue aspirin & plavix (for CAD and PVD)  -continue Losartan 25 mg (MERRILL has resolved)  -Continue metoprolol 25mg q8h per cardio  -Will dc hydralazine per cards  -A-line dc'd 10/29  -Central line dc'd 10/29=  -if rapid svt/afib arrhythmia recurs, may benefit from amiodarone per cardio    Pulm:  -Treatment regimen for status asthmaticus--continue dose of methylprednisone to 40 mg qd, will continue to taper  -continue symbicort, spiriva, albuterol  -Extubated 10/29.   -Supplemental O2 as needed  -Recommend BiPap at night. Discussed with patient. Pt amenable to a trial of Bipap.    Endocrine  -ISS  -Long-acting Insulin    Renal:  -Resolved MERRILL  -Avoid nephrotoxins  -Gonzáles dc'd 10/30  -Continue Flomax    GI  -Diet advanced per speech and swallow recs  -Having watery bowel movements, will hold bowel regimen  -Protonix    ID  -Rocephin day 5/5  -Azithro completed 10/28  -Sputum Cx (10/27): Normal respiratory shayy  -BCx (10/26): NGTD (10/29)  -UCx (10/26): NGF    MSK:  -PT/OT    Prophylaxis:  -Protonix  -Lovenox (MERRILL resolved)    Tubes/Lines  -None    Dispo:  -transfer to [facility] likely today (10/31)

## 2018-10-31 NOTE — PROGRESS NOTE ADULT - ASSESSMENT
77 yo M with asthma, COPD, chronic hypoxemic respiratory failure, CAD with CABG in 2004 presents with worsening respiratory distress over a few days.  Found to have severe hypercapnic respiratory failure, initially improved on BiPAP.  Transferred to ICU and had bradycardic/PEA arrest.   Etiology of his arrest presumably respiratory in etiology  Has not had recent cardiac issues and reports a normal stress test in 8/2018.   LV function normal.  there is no evidence of acute ischemia.; mild trop elevation in the setting of cardiac/resp arrest  low suspicion for acs  NO recurrence of significant dysrhythmias  No significant volume overload on exam    Recommend:  cont asa  cont plavix  DVT prophylaxis  cont metoprolol  cont losartan  cont statin  pulf F/U and RX  if SVT recurs may benefit from amiodarone      The patient is at risk of abrupt decompensation.  30 minutes of critical care time was spent with this patient.

## 2018-10-31 NOTE — PROGRESS NOTE ADULT - SUBJECTIVE AND OBJECTIVE BOX
Patient is a 78y old  Male who presents with a chief complaint of Respiratory Distress (28 Oct 2018 08:41)    Follow up for resp distress: Pt. seen and evaluated for s/p PEA cardiac arrest and acute on chronic respiratory failure.     INTERVAL HPI: Pt seen and examined bedside, has no complaints. extubated on 10/29 and AAOx3. denies any SOB, CP, With O2 NC. feeling better.     OVERNIGHT EVENTS: had sinus tach     Vital Signs Last 24 Hrs  T(C): 37.3 (31 Oct 2018 15:46), Max: 37.7 (31 Oct 2018 00:09)  T(F): 99.2 (31 Oct 2018 15:46), Max: 99.9 (31 Oct 2018 00:09)  HR: 98 (31 Oct 2018 14:00) (85 - 117)  BP: 117/64 (31 Oct 2018 14:00) (103/60 - 149/80)  BP(mean): 83 (31 Oct 2018 14:00) (75 - 108)  RR: 32 (31 Oct 2018 14:00) (17 - 34)  SpO2: 100% (31 Oct 2018 14:00) (91% - 100%)      REVIEW OF SYSTEM:    Constitutional: No fever, chills, fatigue  Neuro: No headache, numbness, weakness  Resp: +cough, wheezing, no shortness of breath  CVS: No chest pain, palpitations, leg swelling  GI: No abdominal pain, nausea, vomiting, diarrhea   : No dysuria, frequency, incontinence  Skin: No itching, burning, rashes, or lesions   Msk: No joint pain or swelling  Psych: No depression, anxiety, mood swings          PHYSICAL EXAM:  GENERAL: NAD, well-developed with O2 nc  HEAD:  Atraumatic, Normocephalic  EYES: EOMI, PERRLA, conjunctiva and sclera clear  NECK: Supple, No JVD, Normal thyroid  HEART: Regular rate and rhythm; No murmurs, rubs, or gallops  RESPIRATORY: CTA B/L, occ wheezing, no rhonchi  ABDOMEN: Soft, Nontender, Nondistended; Bowel sounds present  NEUROLOGY: A&Ox3, nonfocal, moving all extremities.  EXTREMITIES:  2+ Peripheral Pulses, No clubbing, cyanosis, or edema  SKIN: warm, dry, normal color, no rash or abnormal lesions        LABS:                        12.8   9.15  )-----------( 265      ( 31 Oct 2018 04:51 )             41.5     31 Oct 2018 04:51    146    |  103    |  25     ----------------------------<  104    3.8     |  37     |  1.20     Ca    8.8        31 Oct 2018 04:51  Phos  2.9       31 Oct 2018 04:51  Mg     1.8       31 Oct 2018 04:51    TPro  6.3    /  Alb  2.7    /  TBili  0.4    /  DBili  x      /  AST  23     /  ALT  51     /  AlkPhos  75     31 Oct 2018 04:51    PT/INR - ( 31 Oct 2018 04:51 )   PT: 11.2 sec;   INR: 0.98 ratio         PTT - ( 30 Oct 2018 05:49 )  PTT:37.1 sec    CAPILLARY BLOOD GLUCOSE      POCT Blood Glucose.: 321 mg/dL (31 Oct 2018 12:12)  POCT Blood Glucose.: 166 mg/dL (30 Oct 2018 23:39)  POCT Blood Glucose.: 142 mg/dL (30 Oct 2018 17:37)    UCx       RADIOLOGY & ADDITIONAL TESTS:      MEDICATIONS  (STANDING):  aspirin  chewable 81 milliGRAM(s) Oral daily  atorvastatin 40 milliGRAM(s) Oral at bedtime  buDESOnide 160 MICROgram(s)/formoterol 4.5 MICROgram(s) Inhaler 2 Puff(s) Inhalation two times a day  cefTRIAXone   IVPB 1 Gram(s) IV Intermittent every 24 hours  chlorhexidine 0.12% Liquid 15 milliLiter(s) Swish and Spit two times a day  clopidogrel Tablet 75 milliGRAM(s) Oral daily  heparin  Injectable 5000 Unit(s) SubCutaneous every 8 hours  hydrALAZINE 25 milliGRAM(s) Oral every 8 hours  insulin glargine Injectable (LANTUS) 6 Unit(s) SubCutaneous at bedtime  insulin lispro (HumaLOG) corrective regimen sliding scale   SubCutaneous every 6 hours  methylPREDNISolone sodium succinate Injectable 40 milliGRAM(s) IV Push daily  pantoprazole  Injectable 40 milliGRAM(s) IV Push daily  tamsulosin 0.4 milliGRAM(s) Oral at bedtime  tiotropium 18 MICROgram(s) Capsule 1 Capsule(s) Inhalation daily    MEDICATIONS  (PRN):  ALBUTerol/ipratropium for Nebulization 3 milliLiter(s) Nebulizer every 4 hours PRN Shortness of Breath and/or Wheezing

## 2018-10-31 NOTE — PROGRESS NOTE ADULT - ASSESSMENT
77 yo M with Hx asthma, COPD, chronic hypoxemic respiratory failure, CAD with CABG presented with worsening respiratory distress, admitted 10/26 with SOB for 2 days with status asthmaticus but  found to have severe hypercapnic respiratory failure, initially improved on BiPAP. Hospital Course was then complicated by bradycardic PEA arrest resulting anoxic encephalopathy and MERRILL.

## 2018-10-31 NOTE — PROGRESS NOTE ADULT - SUBJECTIVE AND OBJECTIVE BOX
Patient is a 78y old  Male who presents with a chief complaint of Dyspnea (30 Oct 2018 18:46)    24 hour events: Pt experienced sinus tach overnight.     REVIEW OF SYSTEMS****  Constitutional: No fever, chills, fatigue  Neuro: No headache, numbness, weakness  Resp: No cough, wheezing, shortness of breath  CVS: No chest pain, palpitations, leg swelling  GI: No abdominal pain, nausea, vomiting, diarrhea   : No dysuria, frequency, incontinence  Skin: No itching, burning, rashes, or lesions   Msk: No joint pain or swelling  Psych: No depression, anxiety, mood swings    T(F): 99 (10-31-18 @ 04:01), Max: 99.9 (10-31-18 @ 00:09)  HR: 89 (10-31-18 @ 06:00) (85 - 118)  BP: 103/60 (10-31-18 @ 06:00) (103/60 - 165/73)  RR: 19 (10-31-18 @ 06:00) (17 - 34)  SpO2: 100% (10-31-18 @ 06:00) (91% - 100%)  Wt(kg): --      CAPILLARY BLOOD GLUCOSE    POCT Blood Glucose.: 166 mg/dL (30 Oct 2018 23:39)  POCT Blood Glucose.: 142 mg/dL (30 Oct 2018 17:37)  POCT Blood Glucose.: 253 mg/dL (30 Oct 2018 11:31)    I&O's Summary    10-30 @ 07:01  -  10-31 @ 07:00  --------------------------------------------------------  IN: 1104.4 mL / OUT: 851 mL / NET: 253.4 mL      PHYSICAL EXAM*****  Constitutional: Appears comfortable, sitting up in chair, AAOx3  HEENT: pupils 2mm equal and reacting to light  Neck: supple  Cardiovascular: Regular rate & rhythm, +S1/S2  Respiratory: expiratory wheezes, but improved from yesterday, no rales or rhonchi  Gastrointestinal: soft, non-tender, non distended, bowel sounds present x4  Extremities; No edema noted, no cyanosis or no clubbing,  Vascular: bilateral pedal pulse +2, capillary refill < 2 seconds  Neurological: Neuro intact, no deficits  Skin: warm, dry, normal skin turgor    MEDICATIONS    hydrALAZINE Oral  losartan Oral  metoprolol tartrate Oral  tamsulosin Oral  atorvastatin Oral  insulin glargine Injectable (LANTUS) SubCutaneous  insulin lispro (HumaLOG) corrective regimen sliding scale SubCutaneous  predniSONE   Tablet Oral  predniSONE   Tablet Oral  ALBUTerol/ipratropium for Nebulization Nebulizer PRN  buDESOnide 160 MICROgram(s)/formoterol 4.5 MICROgram(s) Inhaler Inhalation  tiotropium 18 MICROgram(s) Capsule Inhalation  aspirin  chewable Oral  clopidogrel Tablet Oral  enoxaparin Injectable SubCutaneous  docusate sodium Oral  senna Oral                        12.8   9.15  )-----------( 265      ( 31 Oct 2018 04:51 )             41.5       10-31    146<H>  |  103  |  25<H>  ----------------------------<  104<H>  3.8   |  37<H>  |  1.20    Ca    8.8      31 Oct 2018 04:51  Phos  2.9     10-31  Mg     1.8     10-31    TPro  6.3  /  Alb  2.7<L>  /  TBili  0.4  /  DBili  x   /  AST  23  /  ALT  51  /  AlkPhos  75  10-31      PT/INR - ( 31 Oct 2018 04:51 )   PT: 11.2 sec;   INR: 0.98 ratio         PTT - ( 30 Oct 2018 05:49 )  PTT:37.1 sec    .Sputum Sputum - trap   Normal Respiratory Maria Esther present   Few polymorphonuclear leukocytes per low power field  Moderate squamous epithelial cells per low power field  Numerous Gram positive cocci in pairs, chains and clusters 10-27 @ 10:23  .Blood Blood   No growth to date. -- 10-26 @ 20:03  .Blood Blood   No growth to date. -- 10-26 @ 20:01  .Urine Catheterized   No growth -- 10-26 @ 19:53    Rapid RVP Result: NotDetec (10-26 @ 10:58)    Radiology: ***  Bedside lung ultrasound: ***  Bedside ECHO: ***      CENTRAL LINE: N  SHARMA: Yes                A-LINE: N    GLOBAL ISSUE/BEST PRACTICE:  Analgesia: n/a  Sedation x  CAM-ICU: LINA  HOB elevation: yes  Stress ulcer prophylaxis: Pantoprazole 40mg IVP twice daily  VTE prophylaxis: he eryn 5000 units SQ every 8 hours  Glycemic control: blood glucose monitoring every 6 hours, insulin coverage  Nutrition:x    CODE STATUS: Full Code Patient is a 78y old  Male who presents with a chief complaint of Dyspnea (30 Oct 2018 18:46)    24 hour events: Pt experienced sinus tach overnight.     REVIEW OF SYSTEMS  Constitutional: No fever, chills, fatigue  Neuro: No headache, numbness, weakness  Resp: No cough, wheezing, shortness of breath  CVS: No chest pain, palpitations, leg swelling  GI: No abdominal pain, nausea, vomiting, diarrhea   : No dysuria, frequency, incontinence  Skin: No itching, burning, rashes, or lesions   Msk: No joint pain or swelling  Psych: No depression, anxiety    T(F): 99 (10-31-18 @ 04:01), Max: 99.9 (10-31-18 @ 00:09)  HR: 89 (10-31-18 @ 06:00) (85 - 118)  BP: 103/60 (10-31-18 @ 06:00) (103/60 - 165/73)  RR: 19 (10-31-18 @ 06:00) (17 - 34)  SpO2: 100% (10-31-18 @ 06:00) (91% - 100%)  Wt(kg): --    CAPILLARY BLOOD GLUCOSE    POCT Blood Glucose.: 166 mg/dL (30 Oct 2018 23:39)  POCT Blood Glucose.: 142 mg/dL (30 Oct 2018 17:37)  POCT Blood Glucose.: 253 mg/dL (30 Oct 2018 11:31)    I&O's Summary    10-30 @ 07:01  -  10-31 @ 07:00  --------------------------------------------------------  IN: 1104.4 mL / OUT: 851 mL / NET: 253.4 mL      PHYSICAL EXAM  Constitutional: Appears comfortable, sitting up in chair, AAOx3  HEENT: pupils 2mm equal and reacting to light  Neck: supple  Cardiovascular: Regular rate & rhythm, +S1/S2  Respiratory: expiratory wheezes, but improving, no rales or rhonchi  Gastrointestinal: soft, non-tender, non distended, bowel sounds present x4  Extremities; No edema noted, no cyanosis or no clubbing,  Vascular: bilateral pedal pulse +2, capillary refill < 2 seconds  Neurological: Neuro intact, no deficits  Skin: warm, dry, normal skin turgor    MEDICATIONS    hydrALAZINE Oral  losartan Oral  metoprolol tartrate Oral  tamsulosin Oral  atorvastatin Oral  insulin glargine Injectable (LANTUS) SubCutaneous  insulin lispro (HumaLOG) corrective regimen sliding scale SubCutaneous  predniSONE   Tablet Oral  predniSONE   Tablet Oral  ALBUTerol/ipratropium for Nebulization Nebulizer PRN  buDESOnide 160 MICROgram(s)/formoterol 4.5 MICROgram(s) Inhaler Inhalation  tiotropium 18 MICROgram(s) Capsule Inhalation  aspirin  chewable Oral  clopidogrel Tablet Oral  enoxaparin Injectable SubCutaneous  docusate sodium Oral  senna Oral                        12.8   9.15  )-----------( 265      ( 31 Oct 2018 04:51 )             41.5     10-31    146<H>  |  103  |  25<H>  ----------------------------<  104<H>  3.8   |  37<H>  |  1.20    Ca    8.8      31 Oct 2018 04:51  Phos  2.9     10-31  Mg     1.8     10-31    TPro  6.3  /  Alb  2.7<L>  /  TBili  0.4  /  DBili  x   /  AST  23  /  ALT  51  /  AlkPhos  75  10-31      PT/INR - ( 31 Oct 2018 04:51 )   PT: 11.2 sec;   INR: 0.98 ratio         PTT - ( 30 Oct 2018 05:49 )  PTT:37.1 sec    .Sputum Sputum - trap   Normal Respiratory Maria Esther present   Few polymorphonuclear leukocytes per low power field  Moderate squamous epithelial cells per low power field  Numerous Gram positive cocci in pairs, chains and clusters 10-27 @ 10:23  .Blood Blood   No growth to date. -- 10-26 @ 20:03  .Blood Blood   No growth to date. -- 10-26 @ 20:01  .Urine Catheterized   No growth -- 10-26 @ 19:53    Rapid RVP Result: NotDetec (10-26 @ 10:58)    Radiology: x  Bedside lung ultrasound: x  Bedside ECHO: x      CENTRAL LINE: N  SHARMA: Yes                A-LINE: N    GLOBAL ISSUE/BEST PRACTICE:  Analgesia: n/a  Sedation x  CAM-ICU: LINA  HOB elevation: yes  Stress ulcer prophylaxis: Pantoprazole 40mg IVP twice daily  VTE prophylaxis: he eryn 5000 units SQ every 8 hours  Glycemic control: blood glucose monitoring every 6 hours, insulin coverage  Nutrition:x    CODE STATUS: Full Code

## 2018-10-31 NOTE — PROGRESS NOTE ADULT - SUBJECTIVE AND OBJECTIVE BOX
Follow up: resp failure, SVT, CAD, CABG, PEA arrest    HPI:  Pt is a 78 year old male with a PMHx COPD, Asthma, Diabetes, Chronic Hypoxemic Respiratory Failure, CAD (s/p CABG) who was brought to the ED by EMS because of SOB. Over the last 4 days the patient endorsed a cough and has become increasingly SOB. He continued to worsen and refused to come to the hospital yesterday (10/25) when his wife suggested it. This AM he went to the bathroom and was so SOB that he could not get off the toilet at which point his wife called EMS.    He is now awake and alert, extubated after bradycardic/PEA arrest. He reports that he has an outside cardiologist in [place] and has been doing well since his coronary artery bypass surgery. He had sinus tachycardia to approximately 115 beats per minute overnight but this is improved. He has no chest pain or other cardiac complaints      PAST MEDICAL & SURGICAL HISTORY:  PVD (peripheral vascular disease)  Hypertension  COPD (chronic obstructive pulmonary disease)  Osteomyelitis  Dyslipidemia  CAD (Coronary Artery Disease)  Diabetes Mellitus, Type II  CABG (Coronary Artery Bypass Graft)      MEDICATIONS  (STANDING):  aspirin  chewable 81 milliGRAM(s) Oral daily  atorvastatin 40 milliGRAM(s) Oral at bedtime  buDESOnide 160 MICROgram(s)/formoterol 4.5 MICROgram(s) Inhaler 2 Puff(s) Inhalation two times a day  clopidogrel Tablet 75 milliGRAM(s) Oral daily  docusate sodium 100 milliGRAM(s) Oral three times a day  enoxaparin Injectable 40 milliGRAM(s) SubCutaneous every 24 hours  insulin glargine Injectable (LANTUS) 6 Unit(s) SubCutaneous at bedtime  insulin lispro (HumaLOG) corrective regimen sliding scale   SubCutaneous every 6 hours  losartan 25 milliGRAM(s) Oral daily  metoprolol tartrate 25 milliGRAM(s) Oral every 8 hours  predniSONE   Tablet 40 milliGRAM(s) Oral daily  predniSONE   Tablet   Oral   senna 1 Tablet(s) Oral daily  tamsulosin 0.4 milliGRAM(s) Oral at bedtime  tiotropium 18 MICROgram(s) Capsule 1 Capsule(s) Inhalation daily    Vital Signs Last 24 Hrs  T(C): 37.2 (31 Oct 2018 04:01), Max: 37.7 (31 Oct 2018 00:09)  T(F): 99 (31 Oct 2018 04:01), Max: 99.9 (31 Oct 2018 00:09)  HR: 98 (31 Oct 2018 08:00) (85 - 118)  BP: 109/63 (31 Oct 2018 08:00) (103/60 - 163/86)  BP(mean): 81 (31 Oct 2018 08:00) (75 - 114)  RR: 29 (31 Oct 2018 08:00) (17 - 34)  SpO2: 97% (31 Oct 2018 08:00) (91% - 100%)    I&O's Summary    30 Oct 2018 07:01  -  31 Oct 2018 07:00  --------------------------------------------------------  IN: 1204.4 mL / OUT: 851 mL / NET: 353.4 mL        PHYSICAL EXAM:    Constitutional: NAD, awake and alert, well-developed  Eyes:  EOMI,  Pupils round, no lesions  ENMT: no exudate or erythema  Pulmonary: breath sounds are clear bilaterally, No wheezing, rales or rhonchi  Cardiovascular: PMI not palpable RRR normal S1 and S2, no murmurs, rubs, gallops or clicks  Gastrointestinal: Bowel Sounds present, soft, nontender.   Lymph: No cervical lymphadenopathy.  Neurological: Alert, no focal deficits  Skin: No rashes.  No cyanosis.  Psych:  Mood & affect appropriate   Ext: No lower ext edema                                12.8   9.15  )-----------( 265      ( 31 Oct 2018 04:51 )             41.5     10-31    146<H>  |  103  |  25<H>  ----------------------------<  104<H>  3.8   |  37<H>  |  1.20    Ca    8.8      31 Oct 2018 04:51  Phos  2.9     10-31  Mg     1.8     10-31    TPro  6.3  /  Alb  2.7<L>  /  TBili  0.4  /  DBili  x   /  AST  23  /  ALT  51  /  AlkPhos  75  10-31      < from: 12 Lead ECG (10.27.18 @ 09:16) >    Ventricular Rate 85 BPM    Atrial Rate 85 BPM    P-R Interval 142 ms    QRS Duration 80 ms    Q-T Interval 368 ms    QTC Calculation(Bezet) 437 ms    P Axis 80 degrees    R Axis 83 degrees    T Axis 72 degrees    Diagnosis Line Normal sinus rhythm  Normal ECG    Confirmed by Tito Kelly ([id number]) on 10/27/2018 12:15:33 PM    < end of copied text >  < from: Xray Chest 1 View-PORTABLE IMMEDIATE (10.26.18 @ 16:08) >    EXAM:  XR CHEST PORTABLE IMMED 1V                            PROCEDURE DATE:  10/26/2018          INTERPRETATION:  Status post right internal jugular line.    AP chest. Prior earlier same day.  Impression:  Left costophrenic excluded. Endotracheal tube nasogastric tube in   position. There has been interval introduction of a right internal   jugular line tip in the superior vena cava. No pneumothorax. No gross   change heart and lungs.                RODRIGO FAITH M.D., ATTENDING RADIOLOGIST  This document has been electronically signed. Oct 26 2018  4:10PM                < end of copied text >  < from: TTE Echo Doppler w/o Cont (10.26.18 @ 19:52) >     EXAM:  ECHO TTE W/O CON COMP W/DOPPLR         PROCEDURE DATE:  10/26/2018        INTERPRETATION:  Ordering Physician: RICKEY TRUJILLO    Indication: Cardiac arrest    Technician: BALDOMERO    Study Quality: Poor given that the patient was supine in the ICU   A complete echocardiographic study was performed utilizing standard   protocol including spectral and color Doppler in all echocardiographic   windows.    Height: 180 cm  Weight: 99 kg  Blood Pressure: 54/74    MEASUREMENTS  IVS: 0.99 cm  PWT: 0.78cm  LA: 3.4cm  AO: 2.9cm  LVIDd: Unable to measured given poor endocardial border definition  LVIDs: Unable to measure given poor endocardial border definition      LVEF: Visually estimated in the LV short axis view (best images of the   LV) is 55-60 %  RVSP: Unable to assess given lack of adequate TR waveform  RA Pressure: 15 mmHg  IVC: Dilated at 2.4 cm in diameter and collapses less than 50% with   inspiration (not valid if patient is intubated and on mechanical   ventilatory support)    FINDINGS  Left Ventricle: Over the left ventricle is poorly visualized. There was   best visualized in the LV short axis view. The visual estimation of the   ejection fraction is 55-60%. I'm unable to rule out regional wall motion   abnormalities given the poor acoustic windows.  Right Ventricle: Overall poorly visualized  Left Atrium: Grossly normal in size  Right Atrium: Overall poorly visualized  Mitral Valve: Normal in structure with trace mitral valve regurgitation  Aortic Valve: Trileaflet and sclerotic with no evidence of significant   insufficiency. No stenosis  Tricuspid Valve: Overall poorly visualized. There was trace tricuspid   valve regurgitation  Pulmonic Valve: Poorly visualized and poor Doppler interrogation  Diastolic Function: The LV diastolic function is indeterminate in this   study  Pericardium/Pleura: No significant pericardial or pleural effusions noted      CONCLUSIONS:  1. Normal technically limited study.  2. The left ventricle was best visualized in the shortaxis view. The LV   systolic function in that view appears preserved with a visually   estimated ejection fraction of 55-60%.  3. I'm unable to rule out regional wall motion abnormalities given the   poor endomyocardial border definition.  4. Normal mitral valve with trace regurgitation.  5. Sclerotic trileaflet aortic valve with no insufficiency or stenosis.  6. Poorly visualized right-sided chambers.  7. Limited study to assess pulmonary artery systolic pressure.                  TITO KELLY   This document has been electronically signed. Oct 27 2018 11:31AM                < end of copied text >

## 2018-11-01 LAB
ALBUMIN SERPL ELPH-MCNC: 2.6 G/DL — LOW (ref 3.3–5)
ALP SERPL-CCNC: 71 U/L — SIGNIFICANT CHANGE UP (ref 40–120)
ALT FLD-CCNC: 58 U/L — SIGNIFICANT CHANGE UP (ref 12–78)
ANION GAP SERPL CALC-SCNC: 5 MMOL/L — SIGNIFICANT CHANGE UP (ref 5–17)
AST SERPL-CCNC: 29 U/L — SIGNIFICANT CHANGE UP (ref 15–37)
BASOPHILS # BLD AUTO: 0.01 K/UL — SIGNIFICANT CHANGE UP (ref 0–0.2)
BASOPHILS NFR BLD AUTO: 0.1 % — SIGNIFICANT CHANGE UP (ref 0–2)
BILIRUB SERPL-MCNC: 0.4 MG/DL — SIGNIFICANT CHANGE UP (ref 0.2–1.2)
BUN SERPL-MCNC: 23 MG/DL — SIGNIFICANT CHANGE UP (ref 7–23)
CALCIUM SERPL-MCNC: 9.3 MG/DL — SIGNIFICANT CHANGE UP (ref 8.5–10.1)
CHLORIDE SERPL-SCNC: 103 MMOL/L — SIGNIFICANT CHANGE UP (ref 96–108)
CO2 SERPL-SCNC: 37 MMOL/L — HIGH (ref 22–31)
CREAT SERPL-MCNC: 1.2 MG/DL — SIGNIFICANT CHANGE UP (ref 0.5–1.3)
EOSINOPHIL # BLD AUTO: 0.33 K/UL — SIGNIFICANT CHANGE UP (ref 0–0.5)
EOSINOPHIL NFR BLD AUTO: 4 % — SIGNIFICANT CHANGE UP (ref 0–6)
GLUCOSE SERPL-MCNC: 140 MG/DL — HIGH (ref 70–99)
HCT VFR BLD CALC: 37.9 % — LOW (ref 39–50)
HGB BLD-MCNC: 11.7 G/DL — LOW (ref 13–17)
IMM GRANULOCYTES NFR BLD AUTO: 1 % — SIGNIFICANT CHANGE UP (ref 0–1.5)
LYMPHOCYTES # BLD AUTO: 2.23 K/UL — SIGNIFICANT CHANGE UP (ref 1–3.3)
LYMPHOCYTES # BLD AUTO: 27.2 % — SIGNIFICANT CHANGE UP (ref 13–44)
MAGNESIUM SERPL-MCNC: 2 MG/DL — SIGNIFICANT CHANGE UP (ref 1.6–2.6)
MCHC RBC-ENTMCNC: 27.5 PG — SIGNIFICANT CHANGE UP (ref 27–34)
MCHC RBC-ENTMCNC: 30.9 GM/DL — LOW (ref 32–36)
MCV RBC AUTO: 89.2 FL — SIGNIFICANT CHANGE UP (ref 80–100)
MONOCYTES # BLD AUTO: 0.93 K/UL — HIGH (ref 0–0.9)
MONOCYTES NFR BLD AUTO: 11.4 % — SIGNIFICANT CHANGE UP (ref 2–14)
NEUTROPHILS # BLD AUTO: 4.61 K/UL — SIGNIFICANT CHANGE UP (ref 1.8–7.4)
NEUTROPHILS NFR BLD AUTO: 56.3 % — SIGNIFICANT CHANGE UP (ref 43–77)
PHOSPHATE SERPL-MCNC: 2.7 MG/DL — SIGNIFICANT CHANGE UP (ref 2.5–4.5)
PLATELET # BLD AUTO: 254 K/UL — SIGNIFICANT CHANGE UP (ref 150–400)
POTASSIUM SERPL-MCNC: 3.4 MMOL/L — LOW (ref 3.5–5.3)
POTASSIUM SERPL-SCNC: 3.4 MMOL/L — LOW (ref 3.5–5.3)
PROT SERPL-MCNC: 6 G/DL — SIGNIFICANT CHANGE UP (ref 6–8.3)
RBC # BLD: 4.25 M/UL — SIGNIFICANT CHANGE UP (ref 4.2–5.8)
RBC # FLD: 12.8 % — SIGNIFICANT CHANGE UP (ref 10.3–14.5)
SODIUM SERPL-SCNC: 145 MMOL/L — SIGNIFICANT CHANGE UP (ref 135–145)
WBC # BLD: 8.19 K/UL — SIGNIFICANT CHANGE UP (ref 3.8–10.5)
WBC # FLD AUTO: 8.19 K/UL — SIGNIFICANT CHANGE UP (ref 3.8–10.5)

## 2018-11-01 PROCEDURE — 99233 SBSQ HOSP IP/OBS HIGH 50: CPT

## 2018-11-01 PROCEDURE — 99232 SBSQ HOSP IP/OBS MODERATE 35: CPT

## 2018-11-01 RX ORDER — POTASSIUM CHLORIDE 20 MEQ
20 PACKET (EA) ORAL ONCE
Qty: 0 | Refills: 0 | Status: COMPLETED | OUTPATIENT
Start: 2018-11-01 | End: 2018-11-01

## 2018-11-01 RX ADMIN — BUDESONIDE AND FORMOTEROL FUMARATE DIHYDRATE 2 PUFF(S): 160; 4.5 AEROSOL RESPIRATORY (INHALATION) at 19:42

## 2018-11-01 RX ADMIN — Medication 4: at 18:11

## 2018-11-01 RX ADMIN — Medication 20 MILLIEQUIVALENT(S): at 12:47

## 2018-11-01 RX ADMIN — Medication 6: at 12:46

## 2018-11-01 RX ADMIN — Medication 25 MILLIGRAM(S): at 21:26

## 2018-11-01 RX ADMIN — TIOTROPIUM BROMIDE 1 CAPSULE(S): 18 CAPSULE ORAL; RESPIRATORY (INHALATION) at 05:57

## 2018-11-01 RX ADMIN — LOSARTAN POTASSIUM 25 MILLIGRAM(S): 100 TABLET, FILM COATED ORAL at 05:50

## 2018-11-01 RX ADMIN — INSULIN GLARGINE 6 UNIT(S): 100 INJECTION, SOLUTION SUBCUTANEOUS at 21:27

## 2018-11-01 RX ADMIN — Medication 2: at 08:36

## 2018-11-01 RX ADMIN — ATORVASTATIN CALCIUM 40 MILLIGRAM(S): 80 TABLET, FILM COATED ORAL at 21:26

## 2018-11-01 RX ADMIN — Medication 40 MILLIGRAM(S): at 05:50

## 2018-11-01 RX ADMIN — SENNA PLUS 1 TABLET(S): 8.6 TABLET ORAL at 21:26

## 2018-11-01 RX ADMIN — CLOPIDOGREL BISULFATE 75 MILLIGRAM(S): 75 TABLET, FILM COATED ORAL at 12:47

## 2018-11-01 RX ADMIN — Medication 2: at 21:27

## 2018-11-01 RX ADMIN — Medication 25 MILLIGRAM(S): at 05:50

## 2018-11-01 RX ADMIN — Medication 100 MILLIGRAM(S): at 21:26

## 2018-11-01 RX ADMIN — ENOXAPARIN SODIUM 40 MILLIGRAM(S): 100 INJECTION SUBCUTANEOUS at 12:48

## 2018-11-01 RX ADMIN — Medication 81 MILLIGRAM(S): at 12:48

## 2018-11-01 RX ADMIN — BUDESONIDE AND FORMOTEROL FUMARATE DIHYDRATE 2 PUFF(S): 160; 4.5 AEROSOL RESPIRATORY (INHALATION) at 05:57

## 2018-11-01 RX ADMIN — TAMSULOSIN HYDROCHLORIDE 0.4 MILLIGRAM(S): 0.4 CAPSULE ORAL at 21:26

## 2018-11-01 RX ADMIN — Medication 25 MILLIGRAM(S): at 12:48

## 2018-11-01 NOTE — PROGRESS NOTE ADULT - PROBLEM SELECTOR PLAN 4
continue ASA, Plavix, and Lipitor.
continue ASA, Plavix, BB and Lipitor.
continue ASA, Plavix, and Lipitor.
Hold oral hypoglycmeic meds  Regular Insulin Slide Scale  Finger sticks Q 6 hours  Low Carb 1800 Sang Diet when can tolorate  Hemoglobin A1c
continue ASA, Plavix, BB and Lipitor.

## 2018-11-01 NOTE — PROGRESS NOTE ADULT - SUBJECTIVE AND OBJECTIVE BOX
[facility] [place] [facility] -- Saud Aguilar, Dani, Génesis, Carroll Haney Savella  Office # [phone]      Follow Up:  resp failure, SVT, CAD, CABG, PEA arrest      Subjective/Observations: No events overnight resting comfortably in bed w/o complaints       REVIEW OF SYSTEMS: All other review of systems is negative unless indicated above    PAST MEDICAL & SURGICAL HISTORY:  PVD (peripheral vascular disease)  Hypertension  COPD (chronic obstructive pulmonary disease)  Osteomyelitis  Dyslipidemia  CAD (Coronary Artery Disease)  Diabetes Mellitus, Type II  CABG (Coronary Artery Bypass Graft)      MEDICATIONS  (STANDING):  aspirin  chewable 81 milliGRAM(s) Oral daily  atorvastatin 40 milliGRAM(s) Oral at bedtime  buDESOnide 160 MICROgram(s)/formoterol 4.5 MICROgram(s) Inhaler 2 Puff(s) Inhalation two times a day  clopidogrel Tablet 75 milliGRAM(s) Oral daily  docusate sodium 100 milliGRAM(s) Oral three times a day  enoxaparin Injectable 40 milliGRAM(s) SubCutaneous every 24 hours  insulin glargine Injectable (LANTUS) 6 Unit(s) SubCutaneous at bedtime  insulin lispro (HumaLOG) corrective regimen sliding scale   SubCutaneous Before meals and at bedtime  losartan 25 milliGRAM(s) Oral daily  metoprolol tartrate 25 milliGRAM(s) Oral every 8 hours  predniSONE   Tablet 40 milliGRAM(s) Oral daily  predniSONE   Tablet   Oral   senna 1 Tablet(s) Oral daily  tamsulosin 0.4 milliGRAM(s) Oral at bedtime  tiotropium 18 MICROgram(s) Capsule 1 Capsule(s) Inhalation daily    MEDICATIONS  (PRN):      Allergies    No Known Allergies    Intolerances    shellfish (Nausea)      Vital Signs Last 24 Hrs  T(C): 37.2 (01 Nov 2018 07:50), Max: 37.3 (31 Oct 2018 15:46)  T(F): 98.9 (01 Nov 2018 07:50), Max: 99.2 (31 Oct 2018 15:46)  HR: 86 (01 Nov 2018 07:50) (86 - 117)  BP: 107/68 (01 Nov 2018 07:50) (105/74 - 138/75)  BP(mean): 83 (31 Oct 2018 14:00) (83 - 101)  RR: 18 (01 Nov 2018 07:50) (18 - 33)  SpO2: 100% (01 Nov 2018 07:50) (96% - 100%)    I&O's Summary    31 Oct 2018 07:01  -  01 Nov 2018 07:00  --------------------------------------------------------  IN: 0 mL / OUT: 700 mL / NET: -700 mL          PHYSICAL EXAM:  TELE: NSR @ 80 No events of tele overnight   Constitutional: NAD, awake and alert, well-developed  HEENT: Moist Mucous Membranes, Anicteric  Pulmonary: Non-labored, breath sounds w/ crackles bilaterally at bases , No wheezing,  or rhonchi  Cardiovascular: Regular, S1 and S2 nl, No murmurs, rubs, gallops or clicks  Gastrointestinal: Bowel Sounds present, soft, nontender.   Lymph: No lymphadenopathy. No peripheral edema.  Skin: No visible rashes or ulcers.  Psych:  Mood & affect appropriate    LABS: All Labs Reviewed:                        11.7   8.19  )-----------( 254      ( 01 Nov 2018 06:30 )             37.9                         12.8   9.15  )-----------( 265      ( 31 Oct 2018 04:51 )             41.5                         12.8   13.51 )-----------( 261      ( 30 Oct 2018 05:49 )             41.5     01 Nov 2018 06:30    145    |  103    |  23     ----------------------------<  140    3.4     |  37     |  1.20   31 Oct 2018 04:51    146    |  103    |  25     ----------------------------<  104    3.8     |  37     |  1.20   30 Oct 2018 05:49    146    |  104    |  28     ----------------------------<  139    4.2     |  37     |  1.20     Ca    9.3        01 Nov 2018 06:30  Ca    8.8        31 Oct 2018 04:51  Ca    9.3        30 Oct 2018 05:49  Phos  2.7       01 Nov 2018 06:30  Phos  2.9       31 Oct 2018 04:51  Phos  2.4       30 Oct 2018 05:49  Mg     2.0       01 Nov 2018 06:30  Mg     1.8       31 Oct 2018 04:51  Mg     2.1       30 Oct 2018 05:49    TPro  6.0    /  Alb  2.6    /  TBili  0.4    /  DBili  x      /  AST  29     /  ALT  58     /  AlkPhos  71     01 Nov 2018 06:30  TPro  6.3    /  Alb  2.7    /  TBili  0.4    /  DBili  x      /  AST  23     /  ALT  51     /  AlkPhos  75     31 Oct 2018 04:51  TPro  6.6    /  Alb  2.9    /  TBili  0.4    /  DBili  x      /  AST  20     /  ALT  54     /  AlkPhos  81     30 Oct 2018 05:49    PT/INR - ( 31 Oct 2018 04:51 )   PT: 11.2 sec;   INR: 0.98 ratio                  ECG:  < from: 12 Lead ECG (10.27.18 @ 09:16) >  Ventricular Rate 85 BPM    Atrial Rate 85 BPM    P-R Interval 142 ms    QRS Duration 80 ms    Q-T Interval 368 ms    QTC Calculation(Bezet) 437 ms    P Axis 80 degrees    R Axis 83 degrees    T Axis 72 degrees    Diagnosis Line Normal sinus rhythm  Normal ECG    Confirmed by Tito Taylor ([id number]) on 10/27/2018 12:15:33 PM    < end of copied text >    Echo:  < from: TTE Echo Doppler w/o Cont (10.26.18 @ 19:52) >  EXAM:  ECHO TTE W/O CON COMP W/DOPPLR         PROCEDURE DATE:  10/26/2018        INTERPRETATION:  Ordering Physician: RICKEY TRUJILLO    Indication: Cardiac arrest    Technician:     Study Quality: Poor given that the patient was supine in the ICU   A complete echocardiographic study was performed utilizing standard   protocol including spectral and color Doppler in all echocardiographic   windows.    Height: 180 cm  Weight: 99 kg  Blood Pressure: 54/74    MEASUREMENTS  IVS: 0.99 cm  PWT: 0.78cm  LA: 3.4cm  AO: 2.9cm  LVIDd: Unable to measured given poor endocardial border definition  LVIDs: Unable to measure given poor endocardial border definition      LVEF: Visually estimated in the LV short axis view (best images of the   LV) is 55-60 %  RVSP: Unable to assess given lack of adequate TR waveform  RA Pressure: 15 mmHg  IVC: Dilated at 2.4 cm in diameter and collapses less than 50% with   inspiration (not valid if patient is intubated and on mechanical   ventilatory support)    FINDINGS  Left Ventricle: Over the left ventricle is poorly visualized. There was   best visualized in the LV short axis view. The visual estimation of the   ejection fraction is 55-60%. I'm unable to rule out regional wall motion   abnormalities given the poor acoustic windows.  Right Ventricle: Overall poorly visualized  Left Atrium: Grossly normal in size  Right Atrium: Overall poorly visualized  Mitral Valve: Normal in structure with trace mitral valve regurgitation  Aortic Valve: Trileaflet and sclerotic with no evidence of significant   insufficiency. No stenosis  Tricuspid Valve: Overall poorly visualized. There was trace tricuspid   valve regurgitation  Pulmonic Valve: Poorly visualized and poor Doppler interrogation  Diastolic Function: The LV diastolic function is indeterminate in this   study  Pericardium/Pleura: No significant pericardial or pleural effusions noted      CONCLUSIONS:  1. Normal technically limited study.  2. The left ventricle was best visualized in the shortaxis view. The LV   systolic function in that view appears preserved with a visually   estimated ejection fraction of 55-60%.  3. I'm unable to rule out regional wall motion abnormalities given the   poor endomyocardial border definition.  4. Normal mitral valve with trace regurgitation.  5. Sclerotic trileaflet aortic valve with no insufficiency or stenosis.  6. Poorly visualized right-sided chambers.  7. Limited study to assess pulmonary artery systolic pressure.                  TITO TAYLOR   This document has been electronically signed. Oct 27 2018 11:31AM    < end of copied text >    Radiology:  < from: CT Head No Cont (10.28.18 @ 09:46) >  EXAM:  CT BRAIN                            PROCEDURE DATE:  10/28/2018          INTERPRETATION:  CLINICAL STATEMENT: Altered consciousness    TECHNIQUE: CT of the head was performed without IV contrast.    COMPARISON: None.    FINDINGS:  There is moderate diffuse parenchymal volume loss. There are areas of low   attenuation in the periventricular white matter likely related to mild   chronic microvascular ischemic changes.    There is no acute intracranial hemorrhage, parenchymal mass, or midline   shift. There is no acute territorial infarct. There is no hydrocephalus.    Extra-axial mass measuring 2.2 x 1.4 cm noted in the left parietal region   containing calcifications which may represent meningioma.    The cranium is intact.         Mild mucosal thickening paranasal sinuses.    IMPRESSION:  No acute intracranial hemorrhage or acute territorial infarct.                ALEKS HERBERT M.D., ATTENDING RADIOLOGIST  This document has been electronically signed. Oct 28 2018  9:59AM                < end of copied text >           Austin Salinas ANP   Cardiology

## 2018-11-01 NOTE — PROGRESS NOTE ADULT - PROBLEM SELECTOR PROBLEM 6
MERRILL (acute kidney injury)

## 2018-11-01 NOTE — PROGRESS NOTE ADULT - PROBLEM SELECTOR PROBLEM 2
Acute respiratory failure with hypercapnia
Acute respiratory failure with hypercapnia
Cardiac arrest
Acute respiratory failure with hypercapnia
Acute respiratory failure with hypercapnia

## 2018-11-01 NOTE — PROGRESS NOTE ADULT - SUBJECTIVE AND OBJECTIVE BOX
COMMODORE YUE [facility]    ALLERGY Shellfish  CONTACT Sp Amira C      VITALS/LABS                           11/1/2018 afeb 86 107/68 18 100%   11/1/2018 bpap 10/5/.3   11/1/2018 W 8.1 Hb 11.7 Plt 254 Na 145 K 3.4 CO2 37 Cr 1.2     REVIEW OF SYMPTOMS    Able to give ROS  Yes     RELIABLE No   CONSTITUTIONAL Weakness Yes  Chills No Vision changes No  ENDOCRINE No unexplained hair loss No heat or cold intolerance    ALLERGY No hives  Sore throat No   RESP Coughing blood no  Shortness of breath YES   NEURO No Headache  Confusion Pain neck No   CARDIAC No Chest pain No Palpitations   GI No Pain abdomen NO   Vomiting NO     PHYSICAL EXAM    HEENT Unremarkable PERRLA atraumatic   RESP Fair air entry EXP prolonged    Harsh breath sound Resp distres mild   CARDIAC S1 S2 No S3     NO JVD    ABDOMEN SOFT BS PRESENT NOT DISTENDED No hepatosplenomegaly PEDAL EDEMA present No calf tenderness  NO rash   GENERAL Not TOXIC looking    GLOBAL ISSUE/BEST PRACTICE:      PROBLEM: HOB elevation:   y            PROBLEM: Stress ulcer proph:    protonix 40 (10/26)                       PROBLEM: VTE prophylaxis:      HSC (10/26) ?  Lovenox 40 (10/30)  PROBLEM: PNEUMONIA PPX Chlorhexidine .12%    PROBLEM: Glycemic control:    na  PROBLEM: Nutrition:    pulmocare 1680 (10/27) ? Dys 3 soft thin (10/31)   PROBLEM: Advanced directive: na     PROBLEM: Allergies:  shellfish  PROCEDURE 10/30/2018 Gonzáles DCed  PROCEDURE 10/29/2018 EXtubation  PROCEDURE 10/28/2018 Gonzáles (u retn)   PROCEDURE 10/26/2018 A line   PROCEDURE 10/26/2018 C line RIJ  PROCEDURE 10/26/2018 G tube   PEROCEDURE 10/26/2018 ET intubation  EVENT 10/31/2018 Tr to tele    PATIENT DESCRIPTION 10/26/2018  ADMISSION [facility] P DR DU FREEMAN  77 y/o male pmhx HTN, PVD, DM, Asthma, COPD on 2L home O2, hx CABGx3, HLD presented to ED w/ SOB and increased work of breathing. Pt admitted w/ acute on chronic hypercapnic respiratory failure due to status asthmaticus and hyperglycemia. Subsequently suffered a cardiac arrest (radycardic/PEA arrest. ) in ICU, ICU course was marked with anoxic encephalopathy s/p cardiac arrest  targeted temperature management goal 36C  required heavy sedation due to respiratory status, sedated with propofol and ketamine  ICU course marked  w/ MERRILL, lactic acidosis, and hyperkalemia.  Pulm consulted 10/28/2018     PATIENT MANAGEMENT   10/26/2018  ADMISSION [facility] JACK FREEMAN    CARDIAC ARREST 10/26/2018 LIKELY SEC TO RESP FAILURE TARGETED TEMPERATURE THERAPY WAS GIVEN POST CAC 10/26/2018  Hemodynamics remained stable Possible anoxic encephaslopathy if any was minimal    10/28 CT H n Good neurologic recovery noted   STATUS POST INTUBATION MECHANICAL VENT 10/26-10/29/2018 Did well off vent  COPD Managed with BD steroids  Rxed LABA LAMA ICS   HYPERGLYCEMIA REQUIRED INSULIN DRIO 10/26-10/28/2018   MERRILL NONOLIGURIC 10/26/2018 Resolved  with supp care     PROBLEM/ASSESSMENT/PLAN    CAD  10/26/2018 ECHO ef 55%   ASA 81 (10/28)   Plavix 75 (10/28)   Losartan 25 (10/30)   Metoprolol 25.3 (10/30)   Atorvastat 40 (10/28)   ASSESSMENT/RECOMMENDATIONS  No ongoing ischemia     PROBLEM/ASSESSMENT/PLAN    HYPERTENSION  Hydralazine 25.3  ASSESSMENT/RECOMMENDATIONS    Undre control    PROBLEM/ASSESSMENT/PLAN    RESP TRACT INFECTION   10/26-10/27-10/28-11/1 W 15.3-18.7-17.3 -  8.1  MICROBIO   10/27 Sp c N fl   10/26 bc n   10/26  u c n   10/26 Leg n   10/26 RVP n   ABIO   Rocephin (10/26-10/30)   ASSESSMENT/RECOMMENDATIONS  Cont Rx No obvious active infection    PROBLEM/ASSESSMENT/PLAN    ACUTE COMBINED O2 CO2  RESP FAILURE SEC COPD EX   10/29/2018 Extubated  10/28/2018 AC 12/500/5/.3   10/28/2018 737/56/100  ASSESSMENT/RECOMMENDATIONS  11/1/2018 Used bpap overnight  May need home bpap   11/1/2018 Check abg am     PROBLEM/ASSESSMENT/PLAN    COPD EX   Solumed 40.2 (10/28) ? Solumed 40 (10/29) ? Pred 40.5d (10/30)  Symbicort 160 (10/30)   spiriva (10/30)   Duoneb.6 (10/27)   ASSESSMENT/RECOMMENDATIONS  Consider adding inhaled cs      PROBLEM/ASSESSMENT/PLAN    DM  Lantus 6 (10/28)   iss (10/28)   ASSESSMENT/RECOMMENDATIONS  Monitor acc    PROBLEM/ASSESSMENT/PLAN    BPH   Tamsulosin .4 (10/27)   ASSESSMENT/RECOMMENDATIONS  Cont Rx       PROBLEM/ASSESSMENT/PLAN    D CHF  10/26/2018 ECHO ef 55%   Hydralazine 25.2 (10/30)   metoprolol 25.3 (10/30)   Losartan 25 (10/30)   ASSESSMENT/RECOMMENDATIONS  Cont Rx     TIME SPENT Over 25 minutes aggregate care time spent on encounter; activities included   direct patient care, counseling and/or coordinating care reviewing notes, lab data/ imaging , discussion with multidisciplinary team/ patient  /family. Risks, benefits, alternatives  discussed in detail.

## 2018-11-01 NOTE — PROGRESS NOTE ADULT - PROBLEM SELECTOR PLAN 8
Protonix 40mg IV daily and Heparin 5000 units sQ Q8h.
Protonix 40mg daily and Heparin 5000 units sQ Q8h.

## 2018-11-01 NOTE — PROGRESS NOTE ADULT - PROBLEM SELECTOR PLAN 5
continue Losartan, and metoprolol.  D/C  hydralazine 25mg q8 hrs
continue hydralazinie 25mg q8 hrs
continue Losartan, and metoprolol.  D/C hydralazine 25mg q8 hrs
continue hydralazinie 25mg q8 hrs
heparin for DVT  Protonix for GI  Peridex for VAP   head of bed 30 and frequent suctioning degrees for aspiration

## 2018-11-01 NOTE — PROGRESS NOTE ADULT - ASSESSMENT
79 yo M with asthma, COPD, chronic hypoxemic respiratory failure, CAD with CABG in 2004 presents with worsening respiratory distress over a few days.  Found to have severe hypercapnic respiratory failure, initially improved on BiPAP.  Transferred to ICU and had bradycardic/PEA arrest.   Etiology of his arrest presumably respiratory in etiology  Has not had recent cardiac issues and reports a normal stress test in 8/2018.   LV function normal.  there is no evidence of acute ischemia.; mild trop elevation in the setting of cardiac/resp arrest  low suspicion for acs  NO recurrence of significant dysrhythmias  No significant volume overload on exam    Recommend:  cont asa  cont plavix  DVT prophylaxis  cont metoprolol  cont losartan  cont statin  pulf F/U and RX  if SVT recurs may benefit from amiodarone    -Monitor and replete lytes, keep K>4 and Mg >2  - All other workup per primary team  Thank you for the consult  Will continue to follow  Austin GUY  Cardiology 77 yo M with asthma, COPD, chronic hypoxemic respiratory failure, CAD with CABG in 2004 presents with worsening respiratory distress over a few days.  Found to have severe hypercapnic respiratory failure, initially improved on BiPAP.  Transferred to ICU and had bradycardic/PEA arrest.   Etiology of his arrest presumably respiratory in etiology  Has not had recent cardiac issues and reports a normal stress test in 8/2018.   LV function normal.  there is no evidence of acute ischemia.; mild trop elevation in the setting of cardiac/resp arrest  low suspicion for acs  No recurrence of significant dysrhythmias  No significant volume overload on exam    Recommend:  cont asa  cont plavix  DVT prophylaxis  cont metoprolol  cont losartan  cont statin  pulf F/U and RX  if SVT recurs may benefit from amiodarone    Monitor and replete lytes, keep K>4 and Mg >2  All other workup per primary team    Will continue to follow  Austin GUY  Cardiology 77 yo M with asthma, COPD, chronic hypoxemic respiratory failure, CAD with CABG in 2004 presents with worsening respiratory distress over a few days.  Found to have severe hypercapnic respiratory failure, initially improved on BiPAP.  Transferred to ICU and had bradycardic/PEA arrest.   Etiology of his arrest presumably respiratory in etiology  Has not had recent cardiac issues and reports a normal stress test in 8/2018.   LV function normal.  there is no evidence of acute ischemia.; mild trop elevation in the setting of cardiac/resp arrest  low suspicion for acs  No recurrence of significant dysrhythmias  No significant volume overload on exam    Recommend:  cont asa  cont plavix  DVT prophylaxis  cont metoprolol  cont losartan  cont statin  pulm F/U and RX  if SVT recurs may benefit from amiodarone    Monitor and replete lytes, keep K>4 and Mg >2  All other workup per primary team  He will follow up with Dr. Rod as outpatient    Will continue to follow  Austin GUY  Cardiology

## 2018-11-01 NOTE — PROGRESS NOTE ADULT - PROBLEM SELECTOR PLAN 2
-Acute on chronic hypoxic and hypercapnic respiratory failure 2/2 status asthmaticus/COPD exacerbation:  Extubated yesterday  continue Ceftriaxone 1gm IV daily, solu-medrol tapered to 40mg IV daily  C/W duoneb tx . symbicort, spiriva, albuterol
-Acute on chronic hypoxic and hypercapnic respiratory failure 2/2 status asthmaticus/COPD exacerbation:  Extubated now  continue Ceftriaxone 1gm IV daily, solu-medrol 40mg IV Q12h, and duoneb tx Q4h.    continue symbicort, spiriva, albuterol
Resolved status asthmaticus  Acute on chronic hypoxic and hypercapnic respiratory failure 2/2 status asthmaticus/COPD exacerbation:  Extubated, on O2 nc  continue Ceftriaxone 1gm IV daily,  switched to prednisone 40mg daily  C/W duoneb tx . symbicort, spiriva, albuterol.  On BIPAP at night, will need for home. Dr wilson made aware.
today off sedation was following commands  will keep sedation to tolerate vent support with precedex   cardiac arrest  likely due to profound acidosis and hypercapnia
Resolved status asthmaticus  Acute on chronic hypoxic and hypercapnic respiratory failure 2/2 status asthmaticus/COPD exacerbation:  Extubated yesterday  continue Ceftriaxone 1gm IV daily, solu-medrol tapered to 40mg IV daily  C/W duoneb tx . symbicort, spiriva, albuterol

## 2018-11-01 NOTE — PROGRESS NOTE ADULT - PROBLEM SELECTOR PLAN 1
-Patient currently on Full vent support  -titrate settings to maintain SaO2 >90%, or pH >7.25  -consider low titdal volume ventilation strategy w/ goal Tv 4-6 cc/kg of ideal body weight  -plateu pressure goal <30  -Peridex oral care and VAP prophylaxis with HOB 30 degrees   -aggressive chest PT and suctioning   -daily sedation vacation with spontaneous breathing trial if clinical condition warrants, discuss with respiratory therapy
S/p PEA cardiac arrest:  2/2 hypercapnic respiratory failure.   TTE performed-EF 55-60%.  Received lasix for ? pul edema
S/p PEA cardiac arrest:  2/2 hypercapnic respiratory failure.   TTE performed-EF 55-60%. c/w ASA, plavix.  Stable cardiac
S/p PEA cardiac arrest:  2/2 hypercapnic respiratory failure.   TTE performed-EF 55-60%. c/w ASA, plavix.  if rapid svt/afib arrhythmia recurs, may benefit from amiodarone per cardio  Received lasix for pul edema
S/p PEA cardiac arrest:  2/2 hypercapnic respiratory failure.   TTE performed-EF 55-60%.  Received lasix for pul edema

## 2018-11-01 NOTE — PROGRESS NOTE ADULT - SUBJECTIVE AND OBJECTIVE BOX
Patient is a 78y old  Male who presents with a chief complaint of Respiratory Distress (28 Oct 2018 08:41)    Follow up for resp distress: Pt. seen and evaluated for s/p PEA cardiac arrest and acute on chronic respiratory failure.     INTERVAL HPI: Pt seen and examined bedside with wife present. has no complaints. extubated on 10/29 and AAOx3. denies any SOB, CP, With O2 NC. feeling better. tolerating po diet    OVERNIGHT EVENTS:   Vital Signs Last 24 Hrs  T(C): 36.9 (01 Nov 2018 15:43), Max: 37.2 (31 Oct 2018 20:03)  T(F): 98.5 (01 Nov 2018 15:43), Max: 99 (31 Oct 2018 23:31)  HR: 82 (01 Nov 2018 15:43) (82 - 119)  BP: 131/84 (01 Nov 2018 15:43) (107/68 - 138/73)  BP(mean): --  RR: 18 (01 Nov 2018 15:43) (18 - 20)  SpO2: 100% (01 Nov 2018 15:43) (96% - 100%)    REVIEW OF SYSTEM:    Constitutional: No fever, chills, fatigue  Neuro: No headache, numbness, weakness  Resp: +cough, wheezing, no shortness of breath  CVS: No chest pain, palpitations, leg swelling  GI: No abdominal pain, nausea, vomiting, diarrhea   : No dysuria, frequency, incontinence  Skin: No itching, burning, rashes, or lesions   Msk: No joint pain or swelling  Psych: No depression, anxiety, mood swings          PHYSICAL EXAM:  GENERAL: NAD, well-developed with O2 nc  HEAD:  Atraumatic, Normocephalic  EYES: EOMI, PERRLA, conjunctiva and sclera clear  NECK: Supple, No JVD, Normal thyroid  HEART: Regular rate and rhythm; No murmurs, rubs, or gallops  RESPIRATORY: CTA B/L, occ wheezing, no rhonchi  ABDOMEN: Soft, Nontender, Nondistended; Bowel sounds present  NEUROLOGY: A&Ox3, nonfocal, moving all extremities.  EXTREMITIES:  2+ Peripheral Pulses, No clubbing, cyanosis, or edema  SKIN: warm, dry, normal color, no rash or abnormal lesions        LABS:                        11.7   8.19  )-----------( 254      ( 01 Nov 2018 06:30 )             37.9     01 Nov 2018 06:30    145    |  103    |  23     ----------------------------<  140    3.4     |  37     |  1.20     Ca    9.3        01 Nov 2018 06:30  Phos  2.7       01 Nov 2018 06:30  Mg     2.0       01 Nov 2018 06:30    TPro  6.0    /  Alb  2.6    /  TBili  0.4    /  DBili  x      /  AST  29     /  ALT  58     /  AlkPhos  71     01 Nov 2018 06:30    PT/INR - ( 31 Oct 2018 04:51 )   PT: 11.2 sec;   INR: 0.98 ratio             CAPILLARY BLOOD GLUCOSE      POCT Blood Glucose.: 271 mg/dL (01 Nov 2018 11:56)  POCT Blood Glucose.: 174 mg/dL (01 Nov 2018 08:08)  POCT Blood Glucose.: 167 mg/dL (31 Oct 2018 21:39)  POCT Blood Glucose.: 199 mg/dL (31 Oct 2018 16:53)    [facility]       RADIOLOGY & ADDITIONAL TESTS:        RADIOLOGY & ADDITIONAL TESTS:    MEDICATIONS  (STANDING):  aspirin  chewable 81 milliGRAM(s) Oral daily  atorvastatin 40 milliGRAM(s) Oral at bedtime  buDESOnide 160 MICROgram(s)/formoterol 4.5 MICROgram(s) Inhaler 2 Puff(s) Inhalation two times a day  clopidogrel Tablet 75 milliGRAM(s) Oral daily  docusate sodium 100 milliGRAM(s) Oral three times a day  enoxaparin Injectable 40 milliGRAM(s) SubCutaneous every 24 hours  insulin glargine Injectable (LANTUS) 6 Unit(s) SubCutaneous at bedtime  insulin lispro (HumaLOG) corrective regimen sliding scale   SubCutaneous Before meals and at bedtime  losartan 25 milliGRAM(s) Oral daily  metoprolol tartrate 25 milliGRAM(s) Oral every 8 hours  predniSONE   Tablet 40 milliGRAM(s) Oral daily  predniSONE   Tablet   Oral   senna 1 Tablet(s) Oral daily  tamsulosin 0.4 milliGRAM(s) Oral at bedtime  tiotropium 18 MICROgram(s) Capsule 1 Capsule(s) Inhalation daily    MEDICATIONS  (PRN):

## 2018-11-01 NOTE — PROGRESS NOTE ADULT - PROBLEM SELECTOR PROBLEM 4
CAD (Coronary Artery Disease)
Diabetes Mellitus, Type II
CAD (Coronary Artery Disease)

## 2018-11-01 NOTE — PROGRESS NOTE ADULT - PROBLEM SELECTOR PLAN 3
continue Lantus 6 units SQ QHS and humalog sliding scale
continue Lantus 6 units SQ QHS and humalog sliding scale
continue Lantus 6 units SQ QHS and humalog sliding scale  improved hyperglycemia
profound hypercapnic respiratory failure, now improved  peek plateau pressure today 13  autoPEEP inhibited expiratory pressure on day shift   solumedrol q12h   bronchodilators to q4h and q2h prn  serial ABGs and foloow results  today developing alkalosis will decrease ventilation to maintain CO2
continue Lantus 6 units SQ QHS and humalog sliding scale   improved hyperglycemia

## 2018-11-01 NOTE — PROGRESS NOTE ADULT - PROBLEM SELECTOR PROBLEM 3
Diabetes Mellitus, Type II
Acute asthma exacerbation
Diabetes Mellitus, Type II

## 2018-11-02 ENCOUNTER — TRANSCRIPTION ENCOUNTER (OUTPATIENT)
Age: 78
End: 2018-11-02

## 2018-11-02 VITALS
RESPIRATION RATE: 18 BRPM | SYSTOLIC BLOOD PRESSURE: 118 MMHG | HEART RATE: 92 BPM | OXYGEN SATURATION: 99 % | TEMPERATURE: 98 F | DIASTOLIC BLOOD PRESSURE: 73 MMHG

## 2018-11-02 LAB
ALBUMIN SERPL ELPH-MCNC: 2.7 G/DL — LOW (ref 3.3–5)
ALP SERPL-CCNC: 73 U/L — SIGNIFICANT CHANGE UP (ref 40–120)
ALT FLD-CCNC: 53 U/L — SIGNIFICANT CHANGE UP (ref 12–78)
ANION GAP SERPL CALC-SCNC: 5 MMOL/L — SIGNIFICANT CHANGE UP (ref 5–17)
AST SERPL-CCNC: 22 U/L — SIGNIFICANT CHANGE UP (ref 15–37)
BASE EXCESS BLDA CALC-SCNC: 10.6 MMOL/L — HIGH (ref -2–2)
BILIRUB SERPL-MCNC: 0.5 MG/DL — SIGNIFICANT CHANGE UP (ref 0.2–1.2)
BLOOD GAS COMMENTS ARTERIAL: SIGNIFICANT CHANGE UP
BUN SERPL-MCNC: 22 MG/DL — SIGNIFICANT CHANGE UP (ref 7–23)
CALCIUM SERPL-MCNC: 8.8 MG/DL — SIGNIFICANT CHANGE UP (ref 8.5–10.1)
CHLORIDE SERPL-SCNC: 102 MMOL/L — SIGNIFICANT CHANGE UP (ref 96–108)
CO2 SERPL-SCNC: 36 MMOL/L — HIGH (ref 22–31)
CREAT SERPL-MCNC: 1.1 MG/DL — SIGNIFICANT CHANGE UP (ref 0.5–1.3)
GLUCOSE SERPL-MCNC: 115 MG/DL — HIGH (ref 70–99)
HCO3 BLDA-SCNC: 33 MMOL/L — HIGH (ref 23–27)
HCT VFR BLD CALC: 38.6 % — LOW (ref 39–50)
HGB BLD-MCNC: 12 G/DL — LOW (ref 13–17)
HOROWITZ INDEX BLDA+IHG-RTO: 30 — SIGNIFICANT CHANGE UP
INR BLD: 0.99 RATIO — SIGNIFICANT CHANGE UP (ref 0.88–1.16)
MAGNESIUM SERPL-MCNC: 1.8 MG/DL — SIGNIFICANT CHANGE UP (ref 1.6–2.6)
MCHC RBC-ENTMCNC: 27.5 PG — SIGNIFICANT CHANGE UP (ref 27–34)
MCHC RBC-ENTMCNC: 31.1 GM/DL — LOW (ref 32–36)
MCV RBC AUTO: 88.5 FL — SIGNIFICANT CHANGE UP (ref 80–100)
NRBC # BLD: 0 /100 WBCS — SIGNIFICANT CHANGE UP (ref 0–0)
PCO2 BLDA: 59 MMHG — HIGH (ref 32–46)
PH BLDA: 7.4 — SIGNIFICANT CHANGE UP (ref 7.35–7.45)
PHOSPHATE SERPL-MCNC: 2.2 MG/DL — LOW (ref 2.5–4.5)
PLATELET # BLD AUTO: 256 K/UL — SIGNIFICANT CHANGE UP (ref 150–400)
PO2 BLDA: 51 MMHG — LOW (ref 74–108)
POTASSIUM SERPL-MCNC: 3.3 MMOL/L — LOW (ref 3.5–5.3)
POTASSIUM SERPL-SCNC: 3.3 MMOL/L — LOW (ref 3.5–5.3)
PROT SERPL-MCNC: 6.2 G/DL — SIGNIFICANT CHANGE UP (ref 6–8.3)
PROTHROM AB SERPL-ACNC: 11.3 SEC — SIGNIFICANT CHANGE UP (ref 10–12.9)
RBC # BLD: 4.36 M/UL — SIGNIFICANT CHANGE UP (ref 4.2–5.8)
RBC # FLD: 12.6 % — SIGNIFICANT CHANGE UP (ref 10.3–14.5)
SAO2 % BLDA: 84 % — LOW (ref 92–96)
SODIUM SERPL-SCNC: 143 MMOL/L — SIGNIFICANT CHANGE UP (ref 135–145)
WBC # BLD: 7.47 K/UL — SIGNIFICANT CHANGE UP (ref 3.8–10.5)
WBC # FLD AUTO: 7.47 K/UL — SIGNIFICANT CHANGE UP (ref 3.8–10.5)

## 2018-11-02 PROCEDURE — 93970 EXTREMITY STUDY: CPT | Mod: 26

## 2018-11-02 PROCEDURE — 71045 X-RAY EXAM CHEST 1 VIEW: CPT | Mod: 26

## 2018-11-02 PROCEDURE — 99232 SBSQ HOSP IP/OBS MODERATE 35: CPT

## 2018-11-02 PROCEDURE — 99239 HOSP IP/OBS DSCHRG MGMT >30: CPT

## 2018-11-02 RX ORDER — TIOTROPIUM BROMIDE 18 UG/1
1 CAPSULE ORAL; RESPIRATORY (INHALATION)
Qty: 0 | Refills: 0 | COMMUNITY
Start: 2018-11-02

## 2018-11-02 RX ORDER — FLUTICASONE FUROATE AND VILANTEROL TRIFENATATE 100; 25 UG/1; UG/1
1 POWDER RESPIRATORY (INHALATION)
Qty: 0 | Refills: 0 | COMMUNITY

## 2018-11-02 RX ORDER — DOCUSATE SODIUM 100 MG
1 CAPSULE ORAL
Qty: 0 | Refills: 0 | COMMUNITY
Start: 2018-11-02

## 2018-11-02 RX ORDER — OMEGA-3 ACID ETHYL ESTERS 1 G
2 CAPSULE ORAL
Qty: 0 | Refills: 0 | COMMUNITY

## 2018-11-02 RX ORDER — BUDESONIDE AND FORMOTEROL FUMARATE DIHYDRATE 160; 4.5 UG/1; UG/1
2 AEROSOL RESPIRATORY (INHALATION)
Qty: 0 | Refills: 0 | COMMUNITY
Start: 2018-11-02

## 2018-11-02 RX ORDER — POTASSIUM CHLORIDE 20 MEQ
20 PACKET (EA) ORAL
Qty: 0 | Refills: 0 | Status: COMPLETED | OUTPATIENT
Start: 2018-11-02 | End: 2018-11-02

## 2018-11-02 RX ORDER — UBIDECARENONE 100 MG
1 CAPSULE ORAL
Qty: 0 | Refills: 0 | COMMUNITY

## 2018-11-02 RX ORDER — SENNA PLUS 8.6 MG/1
1 TABLET ORAL
Qty: 0 | Refills: 0 | COMMUNITY
Start: 2018-11-02

## 2018-11-02 RX ORDER — UMECLIDINIUM 62.5 UG/1
1 AEROSOL, POWDER ORAL
Qty: 0 | Refills: 0 | COMMUNITY

## 2018-11-02 RX ORDER — METOPROLOL TARTRATE 50 MG
1 TABLET ORAL
Qty: 0 | Refills: 0 | COMMUNITY
Start: 2018-11-02

## 2018-11-02 RX ORDER — METOPROLOL TARTRATE 50 MG
0.5 TABLET ORAL
Qty: 0 | Refills: 0 | COMMUNITY
Start: 2018-11-02

## 2018-11-02 RX ORDER — ALBUTEROL 90 UG/1
3 AEROSOL, METERED ORAL
Qty: 0 | Refills: 0 | COMMUNITY

## 2018-11-02 RX ADMIN — Medication 10: at 12:53

## 2018-11-02 RX ADMIN — Medication 40 MILLIGRAM(S): at 05:09

## 2018-11-02 RX ADMIN — Medication 4: at 08:47

## 2018-11-02 RX ADMIN — ENOXAPARIN SODIUM 40 MILLIGRAM(S): 100 INJECTION SUBCUTANEOUS at 14:08

## 2018-11-02 RX ADMIN — Medication 20 MILLIEQUIVALENT(S): at 14:08

## 2018-11-02 RX ADMIN — Medication 20 MILLIEQUIVALENT(S): at 10:32

## 2018-11-02 RX ADMIN — BUDESONIDE AND FORMOTEROL FUMARATE DIHYDRATE 2 PUFF(S): 160; 4.5 AEROSOL RESPIRATORY (INHALATION) at 09:32

## 2018-11-02 RX ADMIN — Medication 100 MILLIGRAM(S): at 05:08

## 2018-11-02 RX ADMIN — Medication 25 MILLIGRAM(S): at 05:09

## 2018-11-02 RX ADMIN — Medication 100 MILLIGRAM(S): at 14:10

## 2018-11-02 RX ADMIN — TIOTROPIUM BROMIDE 1 CAPSULE(S): 18 CAPSULE ORAL; RESPIRATORY (INHALATION) at 09:32

## 2018-11-02 RX ADMIN — Medication 81 MILLIGRAM(S): at 14:09

## 2018-11-02 RX ADMIN — CLOPIDOGREL BISULFATE 75 MILLIGRAM(S): 75 TABLET, FILM COATED ORAL at 14:09

## 2018-11-02 RX ADMIN — LOSARTAN POTASSIUM 25 MILLIGRAM(S): 100 TABLET, FILM COATED ORAL at 05:09

## 2018-11-02 NOTE — PROGRESS NOTE ADULT - ASSESSMENT
79 yo M with asthma, COPD, chronic hypoxemic respiratory failure, CAD with CABG in 2004 presents with worsening respiratory distress over a few days.  Found to have severe hypercapnic respiratory failure, initially improved on BiPAP.  Transferred to ICU and had bradycardic/PEA arrest.   Etiology of his arrest presumably respiratory in etiology  Has not had recent cardiac issues and reports a normal stress test in 8/2018.   LV function normal.  there is no evidence of acute ischemia.; mild trop elevation in the setting of cardiac/resp arrest  low suspicion for acs  No recurrence of significant dysrhythmias  No significant volume overload on exam    Recommend:  cont asa  cont plavix  DVT prophylaxis  cont metoprolol  cont losartan  cont statin  pulm F/U and RX  if SVT recurs may benefit from amiodarone    Monitor and replete lytes, keep K>4 and Mg >2  All other workup per primary team  He will follow up with Dr. Rod as outpatient    Will continue to follow  D/C planning to [facility] as per Primary team and CM possibly today as per notes    Cris Galarza, NP-C  Cardiology

## 2018-11-02 NOTE — PROGRESS NOTE ADULT - SUBJECTIVE AND OBJECTIVE BOX
[facility] -- Saud Aguilar, Génesis Louise Pannella, Patel, Savella  Office # [phone]      Follow Up:  resp failure, SVT, CAD, CABG, PEA arrest    Subjective/Observations: Seen and examined.  Awake and alert sitting up in bed.  Tolerating supplemental NC O2 but does not tolerate bipap "states it hurts his nose". Denies cp, sob or palpitations.  NAD.        REVIEW OF SYSTEMS: All other review of systems is negative unless indicated above    PAST MEDICAL & SURGICAL HISTORY:  PVD (peripheral vascular disease)  Hypertension  COPD (chronic obstructive pulmonary disease)  Osteomyelitis  Dyslipidemia  CAD (Coronary Artery Disease)  Diabetes Mellitus, Type II  CABG (Coronary Artery Bypass Graft)      MEDICATIONS  (STANDING):  aspirin  chewable 81 milliGRAM(s) Oral daily  atorvastatin 40 milliGRAM(s) Oral at bedtime  buDESOnide 160 MICROgram(s)/formoterol 4.5 MICROgram(s) Inhaler 2 Puff(s) Inhalation two times a day  clopidogrel Tablet 75 milliGRAM(s) Oral daily  docusate sodium 100 milliGRAM(s) Oral three times a day  enoxaparin Injectable 40 milliGRAM(s) SubCutaneous every 24 hours  insulin glargine Injectable (LANTUS) 6 Unit(s) SubCutaneous at bedtime  insulin lispro (HumaLOG) corrective regimen sliding scale   SubCutaneous Before meals and at bedtime  losartan 25 milliGRAM(s) Oral daily  metoprolol tartrate 25 milliGRAM(s) Oral every 8 hours  potassium chloride    Tablet ER 20 milliEquivalent(s) Oral every 2 hours  predniSONE   Tablet   Oral   senna 1 Tablet(s) Oral daily  tamsulosin 0.4 milliGRAM(s) Oral at bedtime  tiotropium 18 MICROgram(s) Capsule 1 Capsule(s) Inhalation daily    MEDICATIONS  (PRN):      Allergies    No Known Allergies    Intolerances    shellfish (Nausea)          Vital Signs Last 24 Hrs  T(C): 36.9 (02 Nov 2018 07:45), Max: 37.2 (02 Nov 2018 04:48)  T(F): 98.4 (02 Nov 2018 07:45), Max: 99 (02 Nov 2018 04:48)  HR: 92 (02 Nov 2018 07:45) (67 - 99)  BP: 102/71 (02 Nov 2018 07:45) (102/71 - 131/84)  BP(mean): --  RR: 18 (02 Nov 2018 07:45) (18 - 18)  SpO2: 98% (02 Nov 2018 07:45) (94% - 100%)    I&O's Summary    01 Nov 2018 07:01  -  02 Nov 2018 07:00  --------------------------------------------------------  IN: 500 mL / OUT: 700 mL / NET: -200 mL          PHYSICAL EXAM:  TELE: SR 80s  Constitutional: NAD, awake and alert, well-developed  HEENT: Moist Mucous Membranes, Anicteric  Pulmonary: Non-labored, breath sounds with wheezing bilaterally  Cardiovascular: Regular, S1 and S2, No murmurs, rubs, gallops or clicks  Gastrointestinal: Bowel Sounds present, soft, nontender.   Lymph: No peripheral edema. No lymphadenopathy.  Skin: No visible rashes or ulcers.  Psych:  Mood & affect appropriate    LABS: All Labs Reviewed:                        12.0   7.47  )-----------( 256      ( 02 Nov 2018 06:53 )             38.6                         11.7   8.19  )-----------( 254      ( 01 Nov 2018 06:30 )             37.9                         12.8   9.15  )-----------( 265      ( 31 Oct 2018 04:51 )             41.5     02 Nov 2018 06:53    143    |  102    |  22     ----------------------------<  115    3.3     |  36     |  1.10   01 Nov 2018 06:30    145    |  103    |  23     ----------------------------<  140    3.4     |  37     |  1.20   31 Oct 2018 04:51    146    |  103    |  25     ----------------------------<  104    3.8     |  37     |  1.20     Ca    8.8        02 Nov 2018 06:53  Ca    9.3        01 Nov 2018 06:30  Ca    8.8        31 Oct 2018 04:51  Phos  2.2       02 Nov 2018 06:53  Phos  2.7       01 Nov 2018 06:30  Phos  2.9       31 Oct 2018 04:51  Mg     1.8       02 Nov 2018 06:53  Mg     2.0       01 Nov 2018 06:30  Mg     1.8       31 Oct 2018 04:51    TPro  6.2    /  Alb  2.7    /  TBili  0.5    /  DBili  x      /  AST  22     /  ALT  53     /  AlkPhos  73     02 Nov 2018 06:53  TPro  6.0    /  Alb  2.6    /  TBili  0.4    /  DBili  x      /  AST  29     /  ALT  58     /  AlkPhos  71     01 Nov 2018 06:30  TPro  6.3    /  Alb  2.7    /  TBili  0.4    /  DBili  x      /  AST  23     /  ALT  51     /  AlkPhos  75     31 Oct 2018 04:51    PT/INR - ( 02 Nov 2018 06:53 )   PT: 11.3 sec;   INR: 0.99 ratio        < from: 12 Lead ECG (10.27.18 @ 09:16) >  Ventricular Rate 85 BPM    Atrial Rate 85 BPM    P-R Interval 142 ms    QRS Duration 80 ms    Q-T Interval 368 ms    QTC Calculation(Bezet) 437 ms    P Axis 80 degrees    R Axis 83 degrees    T Axis 72 degrees    Diagnosis Line Normal sinus rhythm  Normal ECG    Confirmed by Tito Taylor ([id number]) on 10/27/2018 12:15:33 PM    < end of copied text >     < from: TTE Echo Doppler w/o Cont (10.26.18 @ 19:52) >   EXAM:  ECHO TTE W/O CON COMP W/DOPPLR         PROCEDURE DATE:  10/26/2018        INTERPRETATION:  Ordering Physician: RICKEY TRUJILLO    Indication: Cardiac arrest    Technician:     Study Quality: Poor given that the patient was supine in the ICU   A complete echocardiographic study was performed utilizing standard   protocol including spectral and color Doppler in all echocardiographic   windows.    Height: 180 cm  Weight: 99 kg  Blood Pressure: 54/74    MEASUREMENTS  IVS: 0.99 cm  PWT: 0.78cm  LA: 3.4cm  AO: 2.9cm  LVIDd: Unable to measured given poor endocardial border definition  LVIDs: Unable to measure given poor endocardial border definition      LVEF: Visually estimated in the LV short axis view (best images of the   LV) is 55-60 %  RVSP: Unable to assess given lack of adequate TR waveform  RA Pressure: 15 mmHg  IVC: Dilated at 2.4 cm in diameter and collapses less than 50% with   inspiration (not valid if patient is intubated and on mechanical   ventilatory support)    FINDINGS  Left Ventricle: Over the left ventricle is poorly visualized. There was   best visualized in the LV short axis view. The visual estimation of the   ejection fraction is 55-60%. I'm unable to rule out regional wall motion   abnormalities given the poor acoustic windows.  Right Ventricle: Overall poorly visualized  Left Atrium: Grossly normal in size  Right Atrium: Overall poorly visualized  Mitral Valve: Normal in structure with trace mitral valve regurgitation  Aortic Valve: Trileaflet and sclerotic with no evidence of significant   insufficiency. No stenosis  Tricuspid Valve: Overall poorly visualized. There was trace tricuspid   valve regurgitation  Pulmonic Valve: Poorly visualized and poor Doppler interrogation  Diastolic Function: The LV diastolic function is indeterminate in this   study  Pericardium/Pleura: No significant pericardial or pleural effusions noted      CONCLUSIONS:  1. Normal technically limited study.  2. The left ventricle was best visualized in the shortaxis view. The LV   systolic function in that view appears preserved with a visually   estimated ejection fraction of 55-60%.  3. I'm unable to rule out regional wall motion abnormalities given the   poor endomyocardial border definition.  4. Normal mitral valve with trace regurgitation.  5. Sclerotic trileaflet aortic valve with no insufficiency or stenosis.  6. Poorly visualized right-sided chambers.  7. Limited study to assess pulmonary artery systolic pressure.                  TITO TAYLOR   This document has been electronically signed. Oct 27 2018 11:31AM    < end of copied text >    < from: Xray Chest 1 View- PORTABLE-Routine (11.02.18 @ 08:38) >  EXAM:  XR CHEST PORTABLE ROUTINE 1V                            PROCEDURE DATE:  11/02/2018          INTERPRETATION:  Clinical information: Respiratory failure    Portable study, 8:15 AM    Comparison exam dated 10/26/2018.    The patient is tilted towards the left.    Cardiac monitor leads overlie the chest. Sternal sutures present.    Diaphragm appears flattened, an emphysematous change. No lobar   consolidation effusion or vascular congestion. Heart size within normal   limits. Aortic knob contains calcifications. No acute osseous   abnormalities.    IMPRESSION: See above report                DANNY MCGHEE M.D.,ATTENDING RADIOLOGIST  This document has been electronically signed. Nov 2 2018  9:30AM        < end of copied text >

## 2018-11-02 NOTE — DISCHARGE NOTE ADULT - HOSPITAL COURSE
Pt is a 78 year old male with a PMHx COPD, Asthma, Diabetes, Chronic Hypoxemic Respiratory Failure, CAD (s/p CABG) who was brought to the ED by EMS because of SOB. Over the last 4 days the patient endorsed a cough and has become increasingly SOB. He continued to worsen and refused to come to the hospital yesterday (10/25) when his wife suggested it. This AM he went to the bathroom and was so SOB that he could not get off the toilet at which point his wife called EMS.    In the ED, pt was noted to be in respiratory distress likely secondary to hypercapnia. Bipap was started and nebulizer treatments, iv steroid given were given. ICU was consulted.  Admitted to ICU, On Bipap, IV steroid, Nebulizer, Serial ABGs with  high index of suspicion for decompensation.   Pt monitored and treated for status asthmaticus with profound hypercapnic respiratory failure requiring intubation. complicated by PEA cardiac arrest (likely due to hypercapnic acidosis), anoxic encephalopathy, MERRILL, severe hyperglycemia.  Treated with steroids, bronchodilators, ketamine gtt for sedation and bronchodilator properties. Followed by Cardio, Pul and Renal. Started on TF, MERRILL resolved, extubated and ,  Pt extubated 10/29. OG tube, central line, A-line dc'd 10/29. Pt experienced an episode of SVT/rapid Afib 10/29 around 1830h for about 10 minutes that resolved with metoprolol. Pt clinically improving.   TTE performed-EF 55-60%, Continued aspirin & plavix (for CAD and PVD), continued antihypertensives, resumed Losartan 25 mg (MERRILL has resolved). Treatment regimen for status asthmaticus--continue dose of methylprednisone to 40 mg qd, continued to taper. continued symbicort, spiriva, albuterol. Diet advanced per speech and swallow recs. Started bowel regimen (senna/colace), Protonix for GI ppx. Continue lantus + insulin coverage scale, improved hyperglycemia  Pt had PT and tolerated po diet. Is on O2 NC with BIPAP at night. Off antibx and on steroids taper.   Stable for d/c to AR.  Vital Signs Last 24 Hrs  T(C): 36.9 (02 Nov 2018 07:45), Max: 37.2 (02 Nov 2018 04:48)  T(F): 98.4 (02 Nov 2018 07:45), Max: 99 (02 Nov 2018 04:48)  HR: 92 (02 Nov 2018 07:45) (67 - 99)  BP: 102/71 (02 Nov 2018 07:45) (102/71 - 131/84)  BP(mean): --  RR: 18 (02 Nov 2018 07:45) (18 - 18)  SpO2: 98% (02 Nov 2018 07:45) (94% - 100%)    General: NAD,   Neurology: A&Ox3, nonfocal,  moving all extremities  Respiratory: CTA B/L, no wheezing  CV: RRR, S1S2, no murmurs, rubs or gallops  Abdominal: Soft, NT, ND +BS  Extremities: No edema, + peripheral pulses

## 2018-11-02 NOTE — DISCHARGE NOTE ADULT - CARE PLAN
Principal Discharge DX:	Acute respiratory failure with hypercapnia  Goal:	Resolved  Assessment and plan of treatment:	switched to prednisone 40mg dailytaper as adv.  C/W duoneb tx . symbicort, spiriva, albuterol. On BIPAP at night,  Secondary Diagnosis:	Cardiac arrest  Assessment and plan of treatment:	S/p PEA cardiac arrest:  2/2 hypercapnic respiratory failure.   TTE performed-EF 55-60%. c/w ASA, plavix.  Secondary Diagnosis:	CAD (Coronary Artery Disease)  Assessment and plan of treatment:	continue ASA, Plavix, BB and Lipitor.  Secondary Diagnosis:	Diabetes Mellitus, Type II  Assessment and plan of treatment:	C/W metformin and glipizide.  Accu- checks and ISS  ISS  Secondary Diagnosis:	Hypertension  Assessment and plan of treatment:	Home meds, and metoprolol.

## 2018-11-02 NOTE — PROGRESS NOTE ADULT - PROVIDER SPECIALTY LIST ADULT
Cardiology
Critical Care
Hospitalist
MICU
Neurology
Pulmonology
Neurology
Hospitalist

## 2018-11-02 NOTE — PROGRESS NOTE ADULT - SUBJECTIVE AND OBJECTIVE BOX
[facility]    ALLERGY Shellfish  CONTACT Sp Amira C      VITALS/LABS                           11/2/2018 afeb 92 102/71 18 98% 95   11/2/2018 W 7.4 Hb 12 Plt 256 INR .9 Na 143 K 3./3 CO2 36 Cr 1.1 PO4 2.2   11/2/2018 4L 740/59/51   11/2/2018 CXR Diaphragms flattenedNo consolidation     REVIEW OF SYMPTOMS    Able to give ROS  Yes     RELIABLE No   CONSTITUTIONAL Weakness Yes  Chills No Vision changes No  ENDOCRINE No unexplained hair loss No heat or cold intolerance    ALLERGY No hives  Sore throat No   RESP Coughing blood no  Shortness of breath YES   NEURO No Headache  Confusion Pain neck No   CARDIAC No Chest pain No Palpitations   GI No Pain abdomen NO   Vomiting NO     PHYSICAL EXAM    HEENT Unremarkable PERRLA atraumatic   RESP Fair air entry EXP prolonged    Harsh breath sound Resp distres mild   CARDIAC S1 S2 No S3     NO JVD    ABDOMEN SOFT BS PRESENT NOT DISTENDED No hepatosplenomegaly PEDAL EDEMA present No calf tenderness  NO rash   GENERAL Not TOXIC looking    GLOBAL ISSUE/BEST PRACTICE:      PROBLEM: HOB elevation:   y            PROBLEM: Stress ulcer proph:    protonix 40 (10/26)                       PROBLEM: VTE prophylaxis:      HSC (10/26) ?  Lovenox 40 (10/30)  PROBLEM: PNEUMONIA PPX Chlorhexidine .12%    PROBLEM: Glycemic control:    na  PROBLEM: Nutrition:    pulmocare 1680 (10/27) ? Dys 3 soft thin (10/31)   PROBLEM: Advanced directive: na     PROBLEM: Allergies:  shellfish    PATIENT DESCRIPTION 10/26/2018  ADMISSION [facility] DR DU FREEMAN  79 y/o male pmhx HTN, PVD, DM, Asthma, COPD on 2L home O2, hx CABGx3, HLD presented to ED w/ SOB and increased work of breathing. Pt admitted w/ acute on chronic hypercapnic respiratory failure due to status asthmaticus and hyperglycemia. Subsequently suffered a cardiac arrest (radycardic/PEA arrest. ) in ICU, ICU course was marked with anoxic encephalopathy s/p cardiac arrest  targeted temperature management goal 36C  required heavy sedation due to respiratory status, sedated with propofol and ketamine  ICU course marked  w/ MERRILL, lactic acidosis, and hyperkalemia.  Pulm consulted 10/28/2018     PATIENT MANAGEMENT   10/26/2018  ADMISSION [facility] DR DU FREEMAN      CARDIAC ARREST 10/26/2018 LIKELY SEC TO RESP FAILURE TARGETED TEMPERATURE THERAPY WAS GIVEN POST CAC 10/26/2018  Hemodynamics remained stable Possible anoxic encephaslopathy if any was minimal    10/28 CT H n Good neurologic recovery noted   STATUS POST INTUBATION MECHANICAL VENT 10/26-10/29/2018 Did well off vent Requires noct bpap [employer] was contacted 11/1 to help arrange post DC   HYPOXIA 11/2/2018 4L 740/59/51 V duplex legs ordered 11/2/2018  COPD Managed with BD steroids  Rxed LABA LAMA ICS   S CHF was ruled out 10/26 EF 55%   HYPERGLYCEMIA REQUIRED INSULIN DRIO 10/26-10/28/2018   MERRILL NONOLIGURIC 10/26/2018 Resolved  with supp care     PROBLEM/ASSESSMENT/PLAN    CAD  10/26/2018 ECHO ef 55%   ASA 81 (10/28)   Plavix 75 (10/28)   Losartan 25 (10/30)   Metoprolol 25.3 (10/30)   Atorvastat 40 (10/28)   ASSESSMENT/RECOMMENDATIONS  No ongoing ischemia     PROBLEM/ASSESSMENT/PLAN    HYPERTENSION  Hydralazine 25.3  ASSESSMENT/RECOMMENDATIONS    Undre control    PROBLEM/ASSESSMENT/PLAN    RESP TRACT INFECTION   10/26-10/27-10/28-11/1-11/2 W 15.3-18.7-17.3 -  8.1-7.4  MICROBIO   10/27 Sp c N fl   10/26 bc n   10/26  u c n   10/26 Leg n   10/26 RVP n   ABIO   Rocephin (10/26-10/30)   ASSESSMENT/RECOMMENDATIONS  Cont Rx No obvious active infection    PROBLEM/ASSESSMENT/PLAN    ACUTE COMBINED O2 CO2  RESP FAILURE SEC COPD EX   11/2/2018 4L 740/59/51   10/29/2018 Extubated  10/28/2018 AC 12/500/5/.3   10/28/2018 737/56/100  ASSESSMENT/RECOMMENDATIONS  11/2/2018 [employer] was contacted 11/1 to help arrange home niv as pt still hypercapnic after stabilization Rehab being considerde  11/1/2018 Used bpap overnight  May need home bpap     PROBLEM/ASSESSMENT/PLAN    COPD EX   Solumed 40.2 (10/28) ? Solumed 40 (10/29) ? Pred 40.5d (10/30)  Symbicort 160 (10/30)   spiriva (10/30)   Duoneb.6 (10/27)   ASSESSMENT/RECOMMENDATIONS  Cont Rx    PROBLEM/ASSESSMENT/PLAN    D CHF  10/26/2018 ECHO ef 55%   Hydralazine 25.2 (10/30)   metoprolol 25.3 (10/30)   Losartan 25 (10/30)   ASSESSMENT/RECOMMENDATIONS  Cont Rx     TIME SPENT Over 25 minutes aggregate care time spent on encounter; activities included   direct patient care, counseling and/or coordinating care reviewing notes, lab data/ imaging , discussion with multidisciplinary team/ patient  /family. Risks, benefits, alternatives  discussed in detail.  [facility]    ALLERGY Shellfish  CONTACT Sp Amira C      VITALS/LABS                           11/2/2018 afeb 92 102/71 18 98% 95   11/2/2018 W 7.4 Hb 12 Plt 256 INR .9 Na 143 K 3./3 CO2 36 Cr 1.1 PO4 2.2   11/2/2018 4L 740/59/51   11/2/2018 CXR Diaphragms flattenedNo consolidation     REVIEW OF SYMPTOMS    Able to give ROS  Yes     RELIABLE No   CONSTITUTIONAL Weakness Yes  Chills No Vision changes No  ENDOCRINE No unexplained hair loss No heat or cold intolerance    ALLERGY No hives  Sore throat No   RESP Coughing blood no  Shortness of breath YES   NEURO No Headache  Confusion Pain neck No   CARDIAC No Chest pain No Palpitations   GI No Pain abdomen NO   Vomiting NO     PHYSICAL EXAM    HEENT Unremarkable PERRLA atraumatic   RESP Fair air entry EXP prolonged    Harsh breath sound Resp distres mild   CARDIAC S1 S2 No S3     NO JVD    ABDOMEN SOFT BS PRESENT NOT DISTENDED No hepatosplenomegaly PEDAL EDEMA present No calf tenderness  NO rash   GENERAL Not TOXIC looking    GLOBAL ISSUE/BEST PRACTICE:      PROBLEM: HOB elevation:   y            PROBLEM: Stress ulcer proph:    protonix 40 (10/26)                       PROBLEM: VTE prophylaxis:      HSC (10/26) ?  Lovenox 40 (10/30)  PROBLEM: PNEUMONIA PPX Chlorhexidine .12%    PROBLEM: Glycemic control:    na  PROBLEM: Nutrition:    pulmocare 1680 (10/27) ? Dys 3 soft thin (10/31)   PROBLEM: Advanced directive: na     PROBLEM: Allergies:  shellfish    PATIENT DESCRIPTION 10/26/2018  ADMISSION [facility] DR DU FREEMAN  79 y/o male pmhx HTN, PVD, DM, Asthma, COPD on 2L home O2, hx CABGx3, HLD presented to ED w/ SOB and increased work of breathing. Pt admitted w/ acute on chronic hypercapnic respiratory failure due to status asthmaticus and hyperglycemia. Subsequently suffered a cardiac arrest (radycardic/PEA arrest. ) in ICU, ICU course was marked with anoxic encephalopathy s/p cardiac arrest  targeted temperature management goal 36C  required heavy sedation due to respiratory status, sedated with propofol and ketamine  ICU course marked  w/ MERRILL, lactic acidosis, and hyperkalemia.  Pulm consulted 10/28/2018     PATIENT MANAGEMENT   10/26/2018  ADMISSION [facility] DR DU FREEMAN      CARDIAC ARREST 10/26/2018 LIKELY SEC TO RESP FAILURE TARGETED TEMPERATURE THERAPY WAS GIVEN POST CAC 10/26/2018  Hemodynamics remained stable Possible anoxic encephaslopathy if any was minimal    10/28 CT H n Good neurologic recovery noted   STATUS POST INTUBATION MECHANICAL VENT 10/26-10/29/2018 Did well off vent Requires noct bpap [employer] was contacted 11/1 to help arrange post DC   HYPOXIA 11/2/2018 4L 740/59/51 V duplex legs ordered 11/2/2018  COPD Managed with BD steroids  Rxed LABA LAMA ICS   S CHF was ruled out 10/26 EF 55%   HYPERGLYCEMIA REQUIRED INSULIN DRIO 10/26-10/28/2018   MERRILL NONOLIGURIC 10/26/2018 Resolved  with supp care     PROBLEM/ASSESSMENT/PLAN    CAD  10/26/2018 ECHO ef 55%   ASA 81 (10/28)   Plavix 75 (10/28)   Losartan 25 (10/30)   Metoprolol 25.3 (10/30)   Atorvastat 40 (10/28)   ASSESSMENT/RECOMMENDATIONS  No ongoing ischemia     PROBLEM/ASSESSMENT/PLAN    HYPERTENSION  Hydralazine 25.3  ASSESSMENT/RECOMMENDATIONS    Undre control    PROBLEM/ASSESSMENT/PLAN    RESP TRACT INFECTION   10/26-10/27-10/28-11/1-11/2 W 15.3-18.7-17.3 -  8.1-7.4  MICROBIO   10/27 Sp c N fl   10/26 bc n   10/26  u c n   10/26 Leg n   10/26 RVP n   ABIO   Rocephin (10/26-10/30)   ASSESSMENT/RECOMMENDATIONS  Cont Rx No obvious active infection    PROBLEM/ASSESSMENT/PLAN    ACUTE COMBINED O2 CO2  RESP FAILURE SEC COPD EX   11/2/2018 4L 740/59/51   10/29/2018 Extubated  10/28/2018 AC 12/500/5/.3   10/28/2018 737/56/100  ASSESSMENT/RECOMMENDATIONS  11/2/2018 [employer] was contacted 11/1 to help arrange home niv as pt still hypercapnic after stabilization Rehab being considerde  Patient has chronic resp failure sec to COPD and will need niv to help control CO2 levels   11/1/2018 Used bpap overnight  May need home bpap     PROBLEM/ASSESSMENT/PLAN    COPD EX   Solumed 40.2 (10/28) ? Solumed 40 (10/29) ? Pred 40.5d (10/30)  Symbicort 160 (10/30)   spiriva (10/30)   Duoneb.6 (10/27)   ASSESSMENT/RECOMMENDATIONS  Cont Rx    PROBLEM/ASSESSMENT/PLAN    D CHF  10/26/2018 ECHO ef 55%   Hydralazine 25.2 (10/30)   metoprolol 25.3 (10/30)   Losartan 25 (10/30)   ASSESSMENT/RECOMMENDATIONS  Cont Rx     TIME SPENT Over 25 minutes aggregate care time spent on encounter; activities included   direct patient care, counseling and/or coordinating care reviewing notes, lab data/ imaging , discussion with multidisciplinary team/ patient  /family. Risks, benefits, alternatives  discussed in detail.

## 2018-11-02 NOTE — DISCHARGE NOTE ADULT - PLAN OF CARE
Resolved switched to prednisone 40mg dailytaper as adv.  C/W duoneb tx . symbicort, spiriva, albuterol. On BIPAP at night, S/p PEA cardiac arrest:  2/2 hypercapnic respiratory failure.   TTE performed-EF 55-60%. c/w ASA, plavix. continue ASA, Plavix, BB and Lipitor. C/W metformin and glipizide.  Accu- checks and ISS  ISS Home meds, and metoprolol.

## 2018-11-02 NOTE — DISCHARGE NOTE ADULT - MEDICATION SUMMARY - MEDICATIONS TO STOP TAKING
I will STOP taking the medications listed below when I get home from the hospital:    albuterol 2.5 mg/3 mL (0.083%) inhalation solution  -- 3 milliliter(s) inhaled every 6 hours, As Needed    Fish Oil oral capsule  -- 2 cap(s) by mouth once a day    CoQ10  -- 1 cap(s) by mouth once a day    tumeric  -- once a day    theophylline 400 mg/24 hours oral tablet, extended release  -- 1 tab(s) by mouth every 12 hours    cefuroxime 500 mg oral tablet  -- 1 tab(s) by mouth every 12 hours   -- Finish all this medication unless otherwise directed by prescriber.  Medication should be taken with plenty of water.  Take with food or milk.

## 2018-11-02 NOTE — DISCHARGE NOTE ADULT - PATIENT PORTAL LINK FT
You can access the [facility], offered by [facility], by registering with the following website: [email or web address]

## 2018-11-02 NOTE — DISCHARGE NOTE ADULT - MEDICATION SUMMARY - MEDICATIONS TO TAKE
I will START or STAY ON the medications listed below when I get home from the hospital:    predniSONE 10 mg oral tablet  -- 4 tab(s) by mouth once a dayx 2 days.   2 tabs daily x 3 days  1tab daily x 4 days  -- Indication: For Acute asthma exacerbation    acetaminophen 325 mg oral tablet  -- 2 tab(s) by mouth every 6 hours, As needed, Moderate Pain (4 - 6)  -- Indication: For Pain    aspirin 81 mg oral tablet  -- 1 tab(s) by mouth once a day  -- Indication: For CAD (Coronary Artery Disease)    tamsulosin 0.4 mg oral capsule  -- 1 cap(s) by mouth once a day (at bedtime)  -- Indication: For BPH (benign prostatic hyperplasia)    glipiZIDE 2.5 mg oral tablet, extended release  -- 1 tab(s) by mouth once a day  -- Indication: For Diabetes Mellitus, Type II    metFORMIN 1000 mg oral tablet  -- 1 tab(s) by mouth 2 times a day  -- Indication: For Diabetes Mellitus, Type II    atorvastatin 40 mg oral tablet  -- 1 tab(s) by mouth once a day (at bedtime)  -- Indication: For CAD (Coronary Artery Disease)    Diovan  mg-12.5 mg oral tablet  -- 1 tab(s) by mouth once a day  -- Indication: For Hypertension    clopidogrel 75 mg oral tablet  -- 1 tab(s) by mouth once a day  -- Indication: For CAD (Coronary Artery Disease)    metoprolol tartrate 25 mg oral tablet  -- 1 tab(s) by mouth every 8 hours  -- Indication: For CAD (Coronary Artery Disease)    Symbicort 160 mcg-4.5 mcg/inh inhalation aerosol  -- 2 puff(s) inhaled 2 times a day  -- Indication: For Acute asthma exacerbation    tiotropium 18 mcg inhalation capsule  -- 1 cap(s) inhaled once a day  -- Indication: For Acute asthma exacerbation    docusate sodium 100 mg oral capsule  -- 1 cap(s) by mouth 3 times a day  -- Indication: For COnstipation    senna oral tablet  -- 1 tab(s) by mouth once a day  -- Indication: For COnstipation    Multiple Vitamins oral tablet  -- 1 tab(s) by mouth once a day  -- Indication: For Prophylactic measure    Vitamin C  -- 1 tab(s) by mouth once a day  -- Indication: For Prophylactic measure

## 2018-11-02 NOTE — PROGRESS NOTE ADULT - SUBJECTIVE AND OBJECTIVE BOX
Neurology Follow up note    YUE COTTO78yMale    HPI:  Pt is a 78 year old male with a PMHx COPD, Asthma, Diabetes, Chronic Hypoxemic Respiratory Failure, CAD (s/p CABG) who was brought to the ED by EMS because of SOB. Over the last 4 days the patient endorsed a cough and has become increasingly SOB. He continued to worsen and refused to come to the hospital yesterday (10/25) when his wife suggested it. This AM he went to the bathroom and was so SOB that he could not get off the toilet at which point his wife called EMS.    In the ED, pt was noted to be in respiratory distress likely secondary to hypercapnia. Bipap was started and nebulizer treatments, iv steroid given were given. ICU was consulted. (26 Oct 2018 10:37)      Interval History - doing better.    Patient is seen, chart was reviewed and case was discussed with the treatment team.  Pt is not in any distress.   Lying on bed comfortably.   No events reported overnight.   No clinical seizure was reported.  Sitting on chair bed comfortably.    is at bedside.    Vital Signs Last 24 Hrs  T(C): 36.8 (02 Nov 2018 12:00), Max: 37.2 (02 Nov 2018 04:48)  T(F): 98.3 (02 Nov 2018 12:00), Max: 99 (02 Nov 2018 04:48)  HR: 92 (02 Nov 2018 12:00) (67 - 97)  BP: 118/73 (02 Nov 2018 12:00) (102/71 - 131/84)  BP(mean): --  RR: 18 (02 Nov 2018 12:00) (18 - 18)  SpO2: 99% (02 Nov 2018 12:00) (94% - 100%)        REVIEW OF SYSTEMS:    Constitutional: No fever, weight loss or fatigue  Eyes: No eye pain, visual disturbances, or discharge  ENT:  No difficulty hearing, tinnitus, vertigo; No sinus or throat pain  Neck: No pain or stiffness  Cardiovascular: No chest pain, palpitations, shortness of breath,   Gastrointestinal: No abdominal or epigastric pain. No nausea, vomiting or hematemesis; No diarrhea or constipation. No melena or hematochezia.  Genitourinary: No dysuria, frequency, hematuria or incontinence  Neurological: No headaches, memory loss, loss of strength, numbness or tremors  Psychiatric: No depression, anxiety, mood swings or difficulty sleeping  Musculoskeletal: No joint pain   Skin: No itching, burning, rashes or lesions   Lymph Nodes: No enlarged glands  Endocrine: No heat or cold intolerance; No hair loss, No h/o diabetes or thyroid dysfunction  Allergy and Immunologic: No hives or eczema    On Neurological Examination:    Mental Status - Pt is alert, awake, oriented X3.  immediate recall 3/3 ; 3 minute recall 1/3.  Follows commands well and able to answer questions appropriately  .Mood and affect  normal    Speech - dysarthria.    Cranial Nerves - Pupils 3 mm equal and reactive to light, extraocular eye movements intact. P  Pt has no facial asymmetry. Facial sensation is intact.Tongue - is in midline.    Muscle tone - is normal all over.     Motor Exam - 5/5 of UE.  LE 4/5.    Sensory Exam - Pin prick, temperature, joint position and vibration are intact on either side. Pt withdraws all extremities equally on stimulation. No asymmetry seen. No complaints of tingling, numbness.      coordination:    Finger to nose: normal    Deep tendon Reflexes - 2 plus all over.       Neck Supple -  Yes.     MEDICATIONS    aspirin  chewable 81 milliGRAM(s) Oral daily  atorvastatin 40 milliGRAM(s) Oral at bedtime  buDESOnide 160 MICROgram(s)/formoterol 4.5 MICROgram(s) Inhaler 2 Puff(s) Inhalation two times a day  clopidogrel Tablet 75 milliGRAM(s) Oral daily  docusate sodium 100 milliGRAM(s) Oral three times a day  enoxaparin Injectable 40 milliGRAM(s) SubCutaneous every 24 hours  insulin glargine Injectable (LANTUS) 6 Unit(s) SubCutaneous at bedtime  insulin lispro (HumaLOG) corrective regimen sliding scale   SubCutaneous Before meals and at bedtime  losartan 25 milliGRAM(s) Oral daily  metoprolol tartrate 25 milliGRAM(s) Oral every 8 hours  potassium chloride    Tablet ER 20 milliEquivalent(s) Oral every 2 hours  predniSONE   Tablet   Oral   senna 1 Tablet(s) Oral daily  tamsulosin 0.4 milliGRAM(s) Oral at bedtime  tiotropium 18 MICROgram(s) Capsule 1 Capsule(s) Inhalation daily      Allergies    No Known Allergies    Intolerances    shellfish (Nausea)      LABS:  CBC Full  -  ( 02 Nov 2018 06:53 )  WBC Count : 7.47 K/uL  Hemoglobin : 12.0 g/dL  Hematocrit : 38.6 %  Platelet Count - Automated : 256 K/uL  Mean Cell Volume : 88.5 fl  Mean Cell Hemoglobin : 27.5 pg  Mean Cell Hemoglobin Concentration : 31.1 gm/dL  Auto Neutrophil # : x  Auto Lymphocyte # : x  Auto Monocyte # : x  Au x  Auto Basophil % : x      11-02    143  |  102  |  22  ----------------------------<  115<H>  3.3<L>   |  36<H>  |  1.10    Ca    8.8      02 Nov 2018 06:53  Phos  2.2     11-02  Mg     1.8     11-02    TPro  6.2  /  Alb  2.7<L>  /  TBili  0.5  /  DBili  x   /  AST  22  /  ALT  53  /  AlkPhos  73  11-02    Hemoglobin A1C:     Vitamin B12     RADIOLOGY    ASSESSMENT AND PLAN:      hypoxic encephalopathy improving mental status.  sp cardiac/respiratory arrest.    Neuro wise stable for SUSI.  Physical therapy evaluation.  Pain is accessed and addressed.  Plan of care was discussed with family. Questions answered.  Would continue to follow.

## 2018-11-11 PROCEDURE — 87086 URINE CULTURE/COLONY COUNT: CPT

## 2018-11-11 PROCEDURE — 87633 RESP VIRUS 12-25 TARGETS: CPT

## 2018-11-11 PROCEDURE — 82803 BLOOD GASES ANY COMBINATION: CPT

## 2018-11-11 PROCEDURE — 83880 ASSAY OF NATRIURETIC PEPTIDE: CPT

## 2018-11-11 PROCEDURE — 94799 UNLISTED PULMONARY SVC/PX: CPT

## 2018-11-11 PROCEDURE — 94003 VENT MGMT INPAT SUBQ DAY: CPT

## 2018-11-11 PROCEDURE — 94770: CPT

## 2018-11-11 PROCEDURE — 82962 GLUCOSE BLOOD TEST: CPT

## 2018-11-11 PROCEDURE — 71045 X-RAY EXAM CHEST 1 VIEW: CPT

## 2018-11-11 PROCEDURE — 99221 1ST HOSP IP/OBS SF/LOW 40: CPT

## 2018-11-11 PROCEDURE — 83735 ASSAY OF MAGNESIUM: CPT

## 2018-11-11 PROCEDURE — 87581 M.PNEUMON DNA AMP PROBE: CPT

## 2018-11-11 PROCEDURE — 94660 CPAP INITIATION&MGMT: CPT

## 2018-11-11 PROCEDURE — 94760 N-INVAS EAR/PLS OXIMETRY 1: CPT

## 2018-11-11 PROCEDURE — 87040 BLOOD CULTURE FOR BACTERIA: CPT

## 2018-11-11 PROCEDURE — 36415 COLL VENOUS BLD VENIPUNCTURE: CPT

## 2018-11-11 PROCEDURE — 81001 URINALYSIS AUTO W/SCOPE: CPT

## 2018-11-11 PROCEDURE — 97167 OT EVAL HIGH COMPLEX 60 MIN: CPT

## 2018-11-11 PROCEDURE — 82553 CREATINE MB FRACTION: CPT

## 2018-11-11 PROCEDURE — 87798 DETECT AGENT NOS DNA AMP: CPT

## 2018-11-11 PROCEDURE — 85730 THROMBOPLASTIN TIME PARTIAL: CPT

## 2018-11-11 PROCEDURE — 99231 SBSQ HOSP IP/OBS SF/LOW 25: CPT

## 2018-11-11 PROCEDURE — 93005 ELECTROCARDIOGRAM TRACING: CPT

## 2018-11-11 PROCEDURE — 99291 CRITICAL CARE FIRST HOUR: CPT | Mod: 25

## 2018-11-11 PROCEDURE — 83605 ASSAY OF LACTIC ACID: CPT

## 2018-11-11 PROCEDURE — 84100 ASSAY OF PHOSPHORUS: CPT

## 2018-11-11 PROCEDURE — 97110 THERAPEUTIC EXERCISES: CPT

## 2018-11-11 PROCEDURE — 93306 TTE W/DOPPLER COMPLETE: CPT

## 2018-11-11 PROCEDURE — 87070 CULTURE OTHR SPECIMN AEROBIC: CPT

## 2018-11-11 PROCEDURE — 85610 PROTHROMBIN TIME: CPT

## 2018-11-11 PROCEDURE — 83036 HEMOGLOBIN GLYCOSYLATED A1C: CPT

## 2018-11-11 PROCEDURE — 94002 VENT MGMT INPAT INIT DAY: CPT

## 2018-11-11 PROCEDURE — 97163 PT EVAL HIGH COMPLEX 45 MIN: CPT

## 2018-11-11 PROCEDURE — 87449 NOS EACH ORGANISM AG IA: CPT

## 2018-11-11 PROCEDURE — 87486 CHLMYD PNEUM DNA AMP PROBE: CPT

## 2018-11-11 PROCEDURE — 82550 ASSAY OF CK (CPK): CPT

## 2018-11-11 PROCEDURE — 70450 CT HEAD/BRAIN W/O DYE: CPT

## 2018-11-11 PROCEDURE — 84484 ASSAY OF TROPONIN QUANT: CPT

## 2018-11-11 PROCEDURE — 93970 EXTREMITY STUDY: CPT

## 2018-11-11 PROCEDURE — 85027 COMPLETE CBC AUTOMATED: CPT

## 2018-11-11 PROCEDURE — 94640 AIRWAY INHALATION TREATMENT: CPT

## 2018-11-11 PROCEDURE — 97116 GAIT TRAINING THERAPY: CPT

## 2018-11-11 PROCEDURE — 36600 WITHDRAWAL OF ARTERIAL BLOOD: CPT

## 2018-11-11 PROCEDURE — 80053 COMPREHEN METABOLIC PANEL: CPT

## 2018-11-11 PROCEDURE — 80048 BASIC METABOLIC PNL TOTAL CA: CPT

## 2018-11-11 PROCEDURE — 97535 SELF CARE MNGMENT TRAINING: CPT

## 2018-11-11 PROCEDURE — 97530 THERAPEUTIC ACTIVITIES: CPT

## 2018-12-21 ENCOUNTER — INPATIENT (INPATIENT)
Facility: HOSPITAL | Age: 78
LOS: 2 days | Discharge: HOME | DRG: 189 | End: 2018-12-24
Attending: HOSPITALIST | Admitting: HOSPITALIST
Payer: COMMERCIAL

## 2018-12-21 VITALS
SYSTOLIC BLOOD PRESSURE: 192 MMHG | HEIGHT: 70 IN | HEART RATE: 120 BPM | WEIGHT: 214.95 LBS | DIASTOLIC BLOOD PRESSURE: 93 MMHG | TEMPERATURE: 98 F | OXYGEN SATURATION: 94 % | RESPIRATION RATE: 20 BRPM

## 2018-12-21 DIAGNOSIS — J44.1 CHRONIC OBSTRUCTIVE PULMONARY DISEASE WITH (ACUTE) EXACERBATION: ICD-10-CM

## 2018-12-21 LAB
ALBUMIN SERPL ELPH-MCNC: 3.8 G/DL — SIGNIFICANT CHANGE UP (ref 3.3–5)
ALP SERPL-CCNC: 102 U/L — SIGNIFICANT CHANGE UP (ref 40–120)
ALT FLD-CCNC: 31 U/L — SIGNIFICANT CHANGE UP (ref 12–78)
ANION GAP SERPL CALC-SCNC: 5 MMOL/L — SIGNIFICANT CHANGE UP (ref 5–17)
APPEARANCE UR: CLEAR — SIGNIFICANT CHANGE UP
APTT BLD: 32.3 SEC — SIGNIFICANT CHANGE UP (ref 27.5–36.3)
AST SERPL-CCNC: 21 U/L — SIGNIFICANT CHANGE UP (ref 15–37)
BACTERIA # UR AUTO: NEGATIVE — SIGNIFICANT CHANGE UP
BASE EXCESS BLDA CALC-SCNC: 4 MMOL/L — HIGH (ref -2–2)
BASOPHILS # BLD AUTO: 0.03 K/UL — SIGNIFICANT CHANGE UP (ref 0–0.2)
BASOPHILS NFR BLD AUTO: 0.4 % — SIGNIFICANT CHANGE UP (ref 0–2)
BILIRUB SERPL-MCNC: 0.4 MG/DL — SIGNIFICANT CHANGE UP (ref 0.2–1.2)
BILIRUB UR-MCNC: NEGATIVE — SIGNIFICANT CHANGE UP
BLD GP AB SCN SERPL QL: SIGNIFICANT CHANGE UP
BLOOD GAS COMMENTS ARTERIAL: SIGNIFICANT CHANGE UP
BLOOD GAS COMMENTS ARTERIAL: SIGNIFICANT CHANGE UP
BUN SERPL-MCNC: 12 MG/DL — SIGNIFICANT CHANGE UP (ref 7–23)
CALCIUM SERPL-MCNC: 9.4 MG/DL — SIGNIFICANT CHANGE UP (ref 8.5–10.1)
CHLORIDE SERPL-SCNC: 105 MMOL/L — SIGNIFICANT CHANGE UP (ref 96–108)
CK MB CFR SERPL CALC: 3.4 NG/ML — SIGNIFICANT CHANGE UP (ref 0–3.6)
CO2 SERPL-SCNC: 33 MMOL/L — HIGH (ref 22–31)
COLOR SPEC: YELLOW — SIGNIFICANT CHANGE UP
CREAT SERPL-MCNC: 1.1 MG/DL — SIGNIFICANT CHANGE UP (ref 0.5–1.3)
DIFF PNL FLD: NEGATIVE — SIGNIFICANT CHANGE UP
EOSINOPHIL # BLD AUTO: 0.86 K/UL — HIGH (ref 0–0.5)
EOSINOPHIL NFR BLD AUTO: 10.8 % — HIGH (ref 0–6)
EPI CELLS # UR: SIGNIFICANT CHANGE UP
FLU A RESULT: SIGNIFICANT CHANGE UP
FLU A RESULT: SIGNIFICANT CHANGE UP
FLUAV AG NPH QL: SIGNIFICANT CHANGE UP
FLUBV AG NPH QL: SIGNIFICANT CHANGE UP
GLUCOSE SERPL-MCNC: 181 MG/DL — HIGH (ref 70–99)
GLUCOSE UR QL: NEGATIVE — SIGNIFICANT CHANGE UP
HCO3 BLDA-SCNC: 27 MMOL/L — SIGNIFICANT CHANGE UP (ref 23–27)
HCT VFR BLD CALC: 42 % — SIGNIFICANT CHANGE UP (ref 39–50)
HGB BLD-MCNC: 13.2 G/DL — SIGNIFICANT CHANGE UP (ref 13–17)
HOROWITZ INDEX BLDA+IHG-RTO: 50 — SIGNIFICANT CHANGE UP
HYALINE CASTS # UR AUTO: ABNORMAL /LPF
IMM GRANULOCYTES NFR BLD AUTO: 0.5 % — SIGNIFICANT CHANGE UP (ref 0–1.5)
INR BLD: 1.01 RATIO — SIGNIFICANT CHANGE UP (ref 0.88–1.16)
KETONES UR-MCNC: NEGATIVE — SIGNIFICANT CHANGE UP
LACTATE SERPL-SCNC: 1 MMOL/L — SIGNIFICANT CHANGE UP (ref 0.7–2)
LEUKOCYTE ESTERASE UR-ACNC: NEGATIVE — SIGNIFICANT CHANGE UP
LYMPHOCYTES # BLD AUTO: 1.31 K/UL — SIGNIFICANT CHANGE UP (ref 1–3.3)
LYMPHOCYTES # BLD AUTO: 16.5 % — SIGNIFICANT CHANGE UP (ref 13–44)
MAGNESIUM SERPL-MCNC: 1.7 MG/DL — SIGNIFICANT CHANGE UP (ref 1.6–2.6)
MCHC RBC-ENTMCNC: 28.3 PG — SIGNIFICANT CHANGE UP (ref 27–34)
MCHC RBC-ENTMCNC: 31.4 GM/DL — LOW (ref 32–36)
MCV RBC AUTO: 90.1 FL — SIGNIFICANT CHANGE UP (ref 80–100)
MONOCYTES # BLD AUTO: 0.72 K/UL — SIGNIFICANT CHANGE UP (ref 0–0.9)
MONOCYTES NFR BLD AUTO: 9 % — SIGNIFICANT CHANGE UP (ref 2–14)
NEUTROPHILS # BLD AUTO: 5 K/UL — SIGNIFICANT CHANGE UP (ref 1.8–7.4)
NEUTROPHILS NFR BLD AUTO: 62.8 % — SIGNIFICANT CHANGE UP (ref 43–77)
NITRITE UR-MCNC: NEGATIVE — SIGNIFICANT CHANGE UP
NRBC # BLD: 0 /100 WBCS — SIGNIFICANT CHANGE UP (ref 0–0)
NT-PROBNP SERPL-SCNC: 152 PG/ML — SIGNIFICANT CHANGE UP (ref 0–450)
PCO2 BLDA: 64 MMHG — HIGH (ref 32–46)
PH BLDA: 7.3 — LOW (ref 7.35–7.45)
PH UR: 5 — SIGNIFICANT CHANGE UP (ref 5–8)
PLATELET # BLD AUTO: 321 K/UL — SIGNIFICANT CHANGE UP (ref 150–400)
PO2 BLDA: 190 MMHG — HIGH (ref 74–108)
POTASSIUM SERPL-MCNC: 4.5 MMOL/L — SIGNIFICANT CHANGE UP (ref 3.5–5.3)
POTASSIUM SERPL-SCNC: 4.5 MMOL/L — SIGNIFICANT CHANGE UP (ref 3.5–5.3)
PROT SERPL-MCNC: 7.2 G/DL — SIGNIFICANT CHANGE UP (ref 6–8.3)
PROT UR-MCNC: 25 MG/DL
PROTHROM AB SERPL-ACNC: 11.4 SEC — SIGNIFICANT CHANGE UP (ref 10–12.9)
RBC # BLD: 4.66 M/UL — SIGNIFICANT CHANGE UP (ref 4.2–5.8)
RBC # FLD: 13.8 % — SIGNIFICANT CHANGE UP (ref 10.3–14.5)
RBC CASTS # UR COMP ASSIST: NEGATIVE /HPF — SIGNIFICANT CHANGE UP (ref 0–4)
RSV RESULT: SIGNIFICANT CHANGE UP
RSV RNA RESP QL NAA+PROBE: SIGNIFICANT CHANGE UP
SAO2 % BLDA: 100 % — HIGH (ref 92–96)
SODIUM SERPL-SCNC: 143 MMOL/L — SIGNIFICANT CHANGE UP (ref 135–145)
SP GR SPEC: 1.01 — SIGNIFICANT CHANGE UP (ref 1.01–1.02)
TROPONIN I SERPL-MCNC: <.015 NG/ML — SIGNIFICANT CHANGE UP (ref 0.01–0.04)
UROBILINOGEN FLD QL: NEGATIVE — SIGNIFICANT CHANGE UP
WBC # BLD: 7.96 K/UL — SIGNIFICANT CHANGE UP (ref 3.8–10.5)
WBC # FLD AUTO: 7.96 K/UL — SIGNIFICANT CHANGE UP (ref 3.8–10.5)
WBC UR QL: SIGNIFICANT CHANGE UP

## 2018-12-21 PROCEDURE — 99291 CRITICAL CARE FIRST HOUR: CPT

## 2018-12-21 PROCEDURE — 99223 1ST HOSP IP/OBS HIGH 75: CPT | Mod: GC,AI

## 2018-12-21 PROCEDURE — 71045 X-RAY EXAM CHEST 1 VIEW: CPT | Mod: 26

## 2018-12-21 PROCEDURE — 93010 ELECTROCARDIOGRAM REPORT: CPT

## 2018-12-21 RX ORDER — LOSARTAN POTASSIUM 100 MG/1
100 TABLET, FILM COATED ORAL DAILY
Qty: 0 | Refills: 0 | Status: DISCONTINUED | OUTPATIENT
Start: 2018-12-21 | End: 2018-12-24

## 2018-12-21 RX ORDER — AZITHROMYCIN 500 MG/1
500 TABLET, FILM COATED ORAL ONCE
Qty: 0 | Refills: 0 | Status: COMPLETED | OUTPATIENT
Start: 2018-12-21 | End: 2018-12-21

## 2018-12-21 RX ORDER — ASPIRIN/CALCIUM CARB/MAGNESIUM 324 MG
81 TABLET ORAL DAILY
Qty: 0 | Refills: 0 | Status: DISCONTINUED | OUTPATIENT
Start: 2018-12-21 | End: 2018-12-24

## 2018-12-21 RX ORDER — AZITHROMYCIN 500 MG/1
TABLET, FILM COATED ORAL
Qty: 0 | Refills: 0 | Status: DISCONTINUED | OUTPATIENT
Start: 2018-12-21 | End: 2018-12-24

## 2018-12-21 RX ORDER — AZITHROMYCIN 500 MG/1
500 TABLET, FILM COATED ORAL EVERY 24 HOURS
Qty: 0 | Refills: 0 | Status: DISCONTINUED | OUTPATIENT
Start: 2018-12-22 | End: 2018-12-24

## 2018-12-21 RX ORDER — SODIUM CHLORIDE 9 MG/ML
1000 INJECTION, SOLUTION INTRAVENOUS
Qty: 0 | Refills: 0 | Status: DISCONTINUED | OUTPATIENT
Start: 2018-12-21 | End: 2018-12-24

## 2018-12-21 RX ORDER — TIOTROPIUM BROMIDE 18 UG/1
1 CAPSULE ORAL; RESPIRATORY (INHALATION) DAILY
Qty: 0 | Refills: 0 | Status: DISCONTINUED | OUTPATIENT
Start: 2018-12-21 | End: 2018-12-24

## 2018-12-21 RX ORDER — INSULIN LISPRO 100/ML
VIAL (ML) SUBCUTANEOUS
Qty: 0 | Refills: 0 | Status: DISCONTINUED | OUTPATIENT
Start: 2018-12-21 | End: 2018-12-24

## 2018-12-21 RX ORDER — TAMSULOSIN HYDROCHLORIDE 0.4 MG/1
0.4 CAPSULE ORAL AT BEDTIME
Qty: 0 | Refills: 0 | Status: DISCONTINUED | OUTPATIENT
Start: 2018-12-21 | End: 2018-12-24

## 2018-12-21 RX ORDER — DEXTROSE 50 % IN WATER 50 %
25 SYRINGE (ML) INTRAVENOUS ONCE
Qty: 0 | Refills: 0 | Status: DISCONTINUED | OUTPATIENT
Start: 2018-12-21 | End: 2018-12-24

## 2018-12-21 RX ORDER — CLOPIDOGREL BISULFATE 75 MG/1
75 TABLET, FILM COATED ORAL DAILY
Qty: 0 | Refills: 0 | Status: DISCONTINUED | OUTPATIENT
Start: 2018-12-21 | End: 2018-12-24

## 2018-12-21 RX ORDER — METOPROLOL TARTRATE 50 MG
25 TABLET ORAL EVERY 8 HOURS
Qty: 0 | Refills: 0 | Status: DISCONTINUED | OUTPATIENT
Start: 2018-12-21 | End: 2018-12-24

## 2018-12-21 RX ORDER — DEXTROSE 50 % IN WATER 50 %
12.5 SYRINGE (ML) INTRAVENOUS ONCE
Qty: 0 | Refills: 0 | Status: DISCONTINUED | OUTPATIENT
Start: 2018-12-21 | End: 2018-12-24

## 2018-12-21 RX ORDER — BUDESONIDE AND FORMOTEROL FUMARATE DIHYDRATE 160; 4.5 UG/1; UG/1
2 AEROSOL RESPIRATORY (INHALATION)
Qty: 0 | Refills: 0 | Status: DISCONTINUED | OUTPATIENT
Start: 2018-12-21 | End: 2018-12-24

## 2018-12-21 RX ORDER — ENOXAPARIN SODIUM 100 MG/ML
40 INJECTION SUBCUTANEOUS DAILY
Qty: 0 | Refills: 0 | Status: DISCONTINUED | OUTPATIENT
Start: 2018-12-21 | End: 2018-12-24

## 2018-12-21 RX ORDER — FUROSEMIDE 40 MG
40 TABLET ORAL ONCE
Qty: 0 | Refills: 0 | Status: COMPLETED | OUTPATIENT
Start: 2018-12-21 | End: 2018-12-21

## 2018-12-21 RX ORDER — GLUCAGON INJECTION, SOLUTION 0.5 MG/.1ML
1 INJECTION, SOLUTION SUBCUTANEOUS ONCE
Qty: 0 | Refills: 0 | Status: DISCONTINUED | OUTPATIENT
Start: 2018-12-21 | End: 2018-12-24

## 2018-12-21 RX ORDER — METFORMIN HYDROCHLORIDE 850 MG/1
1000 TABLET ORAL
Qty: 0 | Refills: 0 | Status: DISCONTINUED | OUTPATIENT
Start: 2018-12-21 | End: 2018-12-22

## 2018-12-21 RX ORDER — ALBUTEROL 90 UG/1
2.5 AEROSOL, METERED ORAL EVERY 4 HOURS
Qty: 0 | Refills: 0 | Status: DISCONTINUED | OUTPATIENT
Start: 2018-12-21 | End: 2018-12-24

## 2018-12-21 RX ORDER — NITROGLYCERIN 6.5 MG
0.4 CAPSULE, EXTENDED RELEASE ORAL ONCE
Qty: 0 | Refills: 0 | Status: COMPLETED | OUTPATIENT
Start: 2018-12-21 | End: 2018-12-21

## 2018-12-21 RX ORDER — IPRATROPIUM/ALBUTEROL SULFATE 18-103MCG
3 AEROSOL WITH ADAPTER (GRAM) INHALATION ONCE
Qty: 0 | Refills: 0 | Status: COMPLETED | OUTPATIENT
Start: 2018-12-21 | End: 2018-12-21

## 2018-12-21 RX ORDER — DEXTROSE 50 % IN WATER 50 %
15 SYRINGE (ML) INTRAVENOUS ONCE
Qty: 0 | Refills: 0 | Status: DISCONTINUED | OUTPATIENT
Start: 2018-12-21 | End: 2018-12-24

## 2018-12-21 RX ADMIN — Medication 125 MILLIGRAM(S): at 19:33

## 2018-12-21 RX ADMIN — Medication 40 MILLIGRAM(S): at 19:34

## 2018-12-21 RX ADMIN — ALBUTEROL 2.5 MILLIGRAM(S): 90 AEROSOL, METERED ORAL at 23:44

## 2018-12-21 RX ADMIN — Medication 3 MILLILITER(S): at 19:40

## 2018-12-21 RX ADMIN — Medication 0.4 MILLIGRAM(S): at 19:32

## 2018-12-21 NOTE — H&P ADULT - NSHPREVIEWOFSYSTEMS_GEN_ALL_CORE
CONSTITUTIONAL: + weakness, + fatigue, denies fever, chills  HEENT: denies blurred vision, sore throat  SKIN: denies new lesions, rash  CARDIOVASCULAR: denies chest pain, chest pressure, palpitations  RESPIRATORY: +shortness of breath, denies sputum production  GASTROINTESTINAL: denies nausea, vomiting, diarrhea, abdominal pain  GENITOURINARY: denies dysuria, discharge  NEUROLOGICAL: denies numbness, headache, focal weakness  MUSCULOSKELETAL: denies new joint pain, muscle aches  HEMATOLOGIC: denies gross bleeding, bruising  LYMPHATICS:  extremity swelling

## 2018-12-21 NOTE — ED PROVIDER NOTE - OBJECTIVE STATEMENT
78 male presents to ER with spouse c/o difficulty breathing for the past few days, lower extremity swelling, getting worse today. Patient denies chest pain, no fever, no abdominal pain.

## 2018-12-21 NOTE — CONSULT NOTE ADULT - SUBJECTIVE AND OBJECTIVE BOX
78y year old Male admitted for Patient is a 78y old  Male who presents with a chief complaint of       79 yo F with histoy of COPD, asthma, DM, PVD, Dyslipidemia, CAD s.p CABG, recent admission for hypercapnic respiratory failure c/b  cardiac arrest , now admitted with shortness of breath.  Per patient and wife, he woke up with difficulty breathing, wife at that time took his blood pressure and the systolic was int he 200s, she gave him albuterol treatment but he continued to have Shortness of breath similar to asthma attacks.  In ED he was given steroids, lasix, azithromycin and started on bipap.  He currently states he feels much better without any shortness of brath and would l freedom to go home.  He denies fever, chills, n/V, palipitations, sick contacts, chest pain, cough.       PMH:  Chronic obstructive pulmonary disease with acute exacerbation  Family history of diabetes mellitus (Sibling)  Handoff  MEWS Score  PVD (peripheral vascular disease)  Hypertension  Diabetes mellitus  COPD (chronic obstructive pulmonary disease)  Osteomyelitis  Asymptomatic Peripheral Vascular Disease  Dyslipidemia  CAD (Coronary Artery Disease)  Diabetes Mellitus, Type II  COPD exacerbation  CABG (Coronary Artery Bypass Graft)  DIFF BREATHING  49        MEDICATIONS  (STANDING):  ALBUTerol    0.083% 2.5 milliGRAM(s) Nebulizer every 4 hours  aspirin enteric coated 81 milliGRAM(s) Oral daily  azithromycin  IVPB 500 milliGRAM(s) IV Intermittent once  azithromycin  IVPB      buDESOnide 160 MICROgram(s)/formoterol 4.5 MICROgram(s) Inhaler 2 Puff(s) Inhalation two times a day  clopidogrel Tablet 75 milliGRAM(s) Oral daily  dextrose 5%. 1000 milliLiter(s) (50 mL/Hr) IV Continuous <Continuous>  dextrose 50% Injectable 12.5 Gram(s) IV Push once  dextrose 50% Injectable 25 Gram(s) IV Push once  dextrose 50% Injectable 25 Gram(s) IV Push once  enoxaparin Injectable 40 milliGRAM(s) SubCutaneous daily  insulin lispro (HumaLOG) corrective regimen sliding scale   SubCutaneous three times a day before meals  losartan 100 milliGRAM(s) Oral daily  metFORMIN 1000 milliGRAM(s) Oral two times a day  methylPREDNISolone sodium succinate Injectable 40 milliGRAM(s) IV Push every 12 hours  metoprolol tartrate 25 milliGRAM(s) Oral every 8 hours  tamsulosin 0.4 milliGRAM(s) Oral at bedtime  tiotropium 18 MICROgram(s) Capsule 1 Capsule(s) Inhalation daily    MEDICATIONS  (PRN):  dextrose 40% Gel 15 Gram(s) Oral once PRN Blood Glucose LESS THAN 70 milliGRAM(s)/deciliter  glucagon  Injectable 1 milliGRAM(s) IntraMuscular once PRN Glucose LESS THAN 70 milligrams/deciliter      I&O's Summary    ICU Vital Signs Last 24 Hrs  T(C): 36.7 (21 Dec 2018 18:47), Max: 36.7 (21 Dec 2018 18:47)  T(F): 98 (21 Dec 2018 18:47), Max: 98 (21 Dec 2018 18:47)  HR: 96 (21 Dec 2018 23:45) (80 - 120)  BP: 139/84 (21 Dec 2018 22:55) (127/62 - 192/93)  BP(mean): --  ABP: --  ABP(mean): --  RR: 24 (21 Dec 2018 22:55) (20 - 25)  SpO2: 97% (21 Dec 2018 23:45) (94% - 99%)      PHYSICAL EXAM:  General: NAD conversant  CV: s1s2 RRR no murmurs  pulm: distant breathsounds bilaterally, mild expiratory wheezing   GI: soft NTND   Extremities: no periferal edema          143  |  105  |  12  ----------------------------<  181<H>  4.5   |  33<H>  |  1.10    Ca    9.4      21 Dec 2018 19:18  Mg     1.7         TPro  7.2  /  Alb  3.8  /  TBili  0.4  /  DBili  x   /  AST  21  /  ALT  31  /  AlkPhos  102                            13.2   7.96  )-----------( 321      ( 21 Dec 2018 19:18 )             42.0     ABG - ( 21 Dec 2018 19:46 )  pH, Arterial: 7.30  pH, Blood: x     /  pCO2: 64    /  pO2: 190   / HCO3: 27    / Base Excess: 4.0   /  SaO2: 100               CARDIAC MARKERS ( 21 Dec 2018 19:18 )  <.015 ng/mL / x     / x     / x     / 3.4 ng/mL      Urinalysis Basic - ( 21 Dec 2018 20:46 )    Color: Yellow / Appearance: Clear / S.010 / pH: x  Gluc: x / Ketone: Negative  / Bili: Negative / Urobili: Negative   Blood: x / Protein: 25 mg/dL / Nitrite: Negative   Leuk Esterase: Negative / RBC: Negative /HPF / WBC 0-2   Sq Epi: x / Non Sq Epi: Occasional / Bacteria: Negative               79 yo F with histoy of COPD, asthma, DM, PVD, Dyslipidemia, CAD s.p CABG, recent admission for hypercapnic respiratory failure c/b  cardiac arrest , now admitted with shortness of breath likely from asthma/copd, he feels much better after treatment with nebs, steroids and bipap, mild expiratory wheezing.  During my exam he was on bipap 14/6 30%, i reduced to CPAP 5, he remained comfortable with TV over 400, not hypoxic, asking to go home.  - Trial off bipap,   - check gas off bipap, previous was 7.3/64/190/27  - continue nebs, steroids, azithro,   - budesonide and tiotropium   - flu is negative  - No need for ICU at this time    discussed with Leonidas MALDONADO

## 2018-12-21 NOTE — H&P ADULT - PROBLEM SELECTOR PLAN 1
Acute respiratory failure with hypercapnia  - Continue CPAP 5 TV > 400  - Continue Albuterol Neb 2.5 q4 hours  - Continue Budesonide/ formoterol BID  - Repeat ABG  - Consult Pulm, Dr. Dejesus, appreciate rec's  - F/u AM Labs: CBC, BMP Acute respiratory failure, acidosis with hypercapnia  - Continue CPAP 5 TV > 400  - Continue Albuterol Neb 2.5 q4 hours  - Continue Budesonide/ formoterol BID  - Repeat ABG  - Consult Pulm, Dr. Dejesus, appreciate rec's  - F/u AM Labs: CBC, BMP Acute respiratory failure, acidosis with hypercapnia  - Continue CPAP 5 TV > 400  - Continue Albuterol Neb 2.5 q4 hours  - Continue Budesonide/ formoterol BID  - Repeat ABG  - Consult Pulm, Dr. Dejesus, appreciate rec's  - F/u AM Labs: CBC, BMP, Blood cultures Acute respiratory failure, acidosis with hypercapnia  Likely viral induced COPD exacerbation, virus not covered by the RVPanel  - Continue CPAP 5 TV > 400  - Continue Albuterol Neb 2.5 q4 hours  - Continue Budesonide/ formoterol BID  - Repeat ABG  - Consult Pulm, Dr. Dejesus, appreciate rec's  - F/u AM Labs: CBC, BMP, Blood cultures Acute respiratory failure, acidosis with hypercapnia  Likely viral induced? possible virus not covered by the current RVPanel  -Pulm Dr Dejesus consulted  -on IV corticoteroids  - Continue CPAP 5 TV > 400  - Continue Albuterol Neb 2.5 q4 hours  - Continue Budesonide/ formoterol BID  - Repeat ABG in AM   - F/u AM Labs: CBC, BMP, Blood cultures

## 2018-12-21 NOTE — H&P ADULT - ATTENDING COMMENTS
Seen and examined by attending MD, agree with above and edited to reflect my findings. Case discussed with patient (wife at bedside) who is alert and oriented , and voiced understanding and agreement to aforementioned plan.

## 2018-12-21 NOTE — H&P ADULT - PROBLEM SELECTOR PLAN 5
IMPROVE VTE Individual Risk Assessment  RISK                                                                Points  [  ] Previous VTE                                                  3  [  ] Thrombophilia                                               2  [  ] Lower limb paralysis                                      2        (unable to hold up >15 seconds)    [  ] Current Cancer                                              2         (within 6 months)  [  ] Immobilization > 24 hrs                                1  [  ] ICU/CCU stay > 24 hours                              1  [ X ] Age > 60                                                      1  IMPROVE VTE Score ____1_____    Lovenox 40 mg subcutaneous daily IMPROVE VTE Individual Risk Assessment  RISK                                                                Points  [  ] Previous VTE                                                  3  [  ] Thrombophilia                                               2  [  ] Lower limb paralysis                                      2        (unable to hold up >15 seconds)    [  ] Current Cancer                                              2         (within 6 months)  [  ] Immobilization > 24 hrs                                1  [  ] ICU/CCU stay > 24 hours                              1  [ X ] Age > 60                                                      1  IMPROVE VTE Score ____1_____    Lovenox 40 mg subcutaneous daily (Padua score 5 infection, age, reduced mobility)

## 2018-12-21 NOTE — CONSULT NOTE ADULT - ASSESSMENT
COMMODORE TURNER [facility]    ALLERGY Shellfish  CONTACT Sp Amira     REASON FOR VISIT   Initial evaluation/Pulmonary consultation requested  12/21/2018 from Dr Dejesus   Patient examined chart reviewed  HOSPITAL ADMISSION [facility] Dr Gregory Reyes   PATIENT CAME  FROM (if information available)      PAST MEDICAL & SURGICAL HISTORY:  PVD (peripheral vascular disease)  Hypertension  COPD (chronic obstructive pulmonary disease)  Osteomyelitis  Dyslipidemia  CAD (Coronary Artery Disease)  Diabetes Mellitus, Type II  CABG (Coronary Artery Bypass Graft)    PATIENT PROBLEMS   10/26/2018  ADMISSION [facility] DR DU FREEMAN    CARDIAC ARREST 10/26/2018   TARGETED TEMPERATURE THERAPY POST CAC 10/26/2018   INTUBATION MECHANICAL VENT 10/26/2018  IV SEDATIVE DRIP FOR MECHANICAL VENTILATION    COPD EX   HYPERGLYCEMIA REQUIRED INSULIN DRIO 10/26-10/28/2018   MERRILL NONOLIGURIC 10/26/2018     VITALS/LABS                           12/21/2018 afeb 102 130/80   12/21/2018 W 7.9 Hb 13.2 Plt 321INR 101 Na 143 K 4.5 CO2 Cr 1.1   12/21/2018 Tr 1 n   12/21/2018 bpap 12/6/.5 730/64/190   12/21/2018 Flu An Flu B n RSV n  12/21/2018 CXR hyperinflation  11/2/2018 W 7.4 Hb 12 Plt 256 INR .9 Na 143 K 3./3 CO2 36 Cr 1.1 PO4 2.2     REVIEW OF SYMPTOMS    Able to give ROS  Yes     RELIABLE No   CONSTITUTIONAL Weakness Yes  Chills No Vision changes No  ENDOCRINE No unexplained hair loss No heat or cold intolerance    ALLERGY No hives  Sore throat No   RESP Coughing blood no  Shortness of breath YES   NEURO No Headache  Confusion Pain neck No   CARDIAC No Chest pain No Palpitations   GI No Pain abdomen NO   Vomiting NO     PHYSICAL EXAM    HEENT Unremarkable PERRLA atraumatic   RESP Fair air entry EXP prolonged    Harsh breath sound Resp distres mild   CARDIAC S1 S2 No S3     NO JVD    ABDOMEN SOFT BS PRESENT NOT DISTENDED No hepatosplenomegaly PEDAL EDEMA present No calf tenderness  NO rash   GENERAL Not TOXIC looking    PATIENT DESCRIPTION PATIENT COMMODORE TURNER 12/21/2018  ADMISSION [facility] DR GREGORY REYES  79 y/o male pmhx HTN, PVD, DM, Asthma, COPD on 2L home O2, hx CABGx3, HLD presented to ED 12/21/2018  w/ SOB and increased work of breathing.   11/2/2018 4L 740/59/51    12/16/2018 Has BPAP at home suppleied by Courtney On 2 l     MEDS   12/16/2018  Breo 200   Incruse   Daliresp  Glipizide 2.5x2   metformin 1000.2   Lopressor 12.5x2   Valsart 80    Tamsulosin .4   Atorvastat 40   Plavix 75     PROBLEM SPECIFIC PATIENT DATA PATIENT COMMODORE YUE 12/21/2018  ADMISSION [facility] P DR GREGORY REYES  79 y/o male pmhx HTN, PVD, DM, Asthma, COPD on 2L home O2, hx CABGx3, HLD presented to ED 12/21/2018  w/ SOB and increased work of breathing. Pateint was neg for flu and rsv (12/21/2018) and CXR showed just hyperinflation ABG revealed ac on chr resp acidosis and pt was placed on BPAP admitted [facility] 12/21/2018 and Pulm consulted   COPD ex albuterol (12/21) symbicort (12/21) spiriva (12/21) solumed 40.2 (12/21)   RO RESP TRACT INFECTION 12/21/2018 Flu AB aand RSV n 12/21/2018 W 7.9  12/21/2018 CXR No infiltr Azithro (12/21)   DM Sl scale (12/21)   RESP  12/21/2018 bpap 12/6/.5 730/64/190   RO MI 12/21/2018 Tr 1 n     ASSESSMENT RECOMMENDATIONSPATIENT COMMODORE YUE 12/21/2018  ADMISSION [facility] P DR GREGORY REYES  COPD ex BD Stroid   RO RESP TRACT INFECTION  Azithro (12/21) for antiinflammatory and immunomodulat   DM Sl scale (12/21)   RESP  12/21/2018 bpap 12/6/.5 730/64/190 Monitor abg   RO MI 12/21/2018 Tr 1 n Check enz     TIME SPENT Over 55 minutes aggregate care time spent on encounter; activities included   direct patient care, counseling and/or coordinating care reviewing notes, lab data/ imaging , discussion with multidisciplinary team/ patient  /family. Risks, benefits, alternatives  discussed in detail.

## 2018-12-21 NOTE — H&P ADULT - ASSESSMENT
79 y/o M PMhx HTN, PVD, DM, Asthma, COPD ( on 2L home O2) c/b Chronic Hypoxemic Respiratory Failure, CABG, HLD, recent admission to [facility] Oct 2018 for hypercapnic respiratory failure c/b  cardiac arrest,  now presents to ED  w/ SOB and increased work of breathing x 1 day. Admit for Acute on Chronic Respiratory failure with hypercapnia.

## 2018-12-21 NOTE — H&P ADULT - NSHPPHYSICALEXAM_GEN_ALL_CORE
T(C): 36.7 (12-21-18 @ 18:47), Max: 36.7 (12-21-18 @ 18:47)  HR: 96 (12-21-18 @ 23:45) (80 - 120)  BP: 139/84 (12-21-18 @ 22:55) (127/62 - 192/93)  RR: 24 (12-21-18 @ 22:55) (20 - 25)  SpO2: 97% (12-21-18 @ 23:45) (94% - 99%)    GENERAL: patient appears ill, no acute distress, appropriate, pleasant, on CPAP resting in bed   EYES: sclera clear, no exudates, PERRLA  ENMT: oropharynx clear without erythema, no exudates, moist mucous membranes  NECK: supple, soft, FROM  LUNGS: wheezing in Left lower lobe  HEART: Tachycardic S1/S2, reg rhythm, no murmurs noted, no lower extremity edema  GASTROINTESTINAL: abdomen is soft, nontender, nondistended, normoactive bowel sounds,   INTEGUMENT: good skin turgor, no lesions noted  MUSCULOSKELETAL: no clubbing or cyanosis, no obvious deformity  NEUROLOGIC: awake, alert, oriented x3, good muscle tone in 4 extremities, no obvious sensory deficits  PSYCHIATRIC: mood is good, affect is congruent, linear and logical thought process  HEME/LYMPH:  no obvious ecchymosis or petechiae

## 2018-12-21 NOTE — ED ADULT NURSE NOTE - OBJECTIVE STATEMENT
Pt A&Ox4, ambulatory to ED c/o difficulty breathing.  Pt is home from recent stay at rehab, uses oxygen during the day and bipap at night.  For the past day or two pt has had increased shortness of breath and difficulty breathing, along with B/L lower leg swelling.  Pt presents slightly diaphoretic, labored breathing at rate of 32-34 BPM with retractions.  Pt denies chest pain, N/V, dizziness.

## 2018-12-21 NOTE — H&P ADULT - PROBLEM SELECTOR PLAN 4
- Chronic  - stable, s/p CABG   - Continue ASA 81 mg daily  - Continue Clopidogrel 75 mg daily  - Continue Atorvastatin 40 mg daily

## 2018-12-21 NOTE — H&P ADULT - HISTORY OF PRESENT ILLNESS
79 y/o M PMhx HTN, PVD, DM, Asthma, COPD ( on 2L home O2) c/b Chronic Hypoxemic Respiratory Failure, CABG, HLD, recent admission to [facility] Oct 2018 for respiratory failure, complicated by PEA/cardiac arrest,  now presents to ED  w/ SOB and increased work of breathing x         In ED,  VS   ABG - ( 21 Dec 2018 19:46 )  pH, Arterial: 7.30  pH, Blood: x     /  pCO2: 64    /  pO2: 190   / HCO3: 27    / Base Excess: 4.0   /  SaO2: 100     ABG revealed Acute on Chronic respiratory acidosis and pt was placed on BPAP    Flu AB aand RSV neg  CXR - hyper inflation, No infiltrate      Received albuterol,  symbicort, spiriva, solumed 40.2, 79 y/o M PMhx HTN, PVD, DM, Asthma, COPD ( on 2L home O2) c/b Chronic Hypoxemic Respiratory Failure, CABG, HLD, recent admission to [facility] Oct 2018 for hypercapnic respiratory failure c/b  cardiac arrest,  now presents to ED  w/ SOB and increased work of breathing x 1 day.  Per patient  he woke up with difficulty breathing, wife at that time took his blood pressure and the systolic was int he 200s, she gave him albuterol treatment but he continued to have Shortness of breath similar to asthma attacks.  Pt also states he has been unable to sleep with the home BIPAP mask, waking up the in the middle of the night and taking it off in attempt to sleep. Both nights he was unable to go back to sleep and has been very tired the past two days due to lack of sleep. Pt has been consistently using his 2L home O2 during the daytime and reports LA with climbing stairs, which is chronic for him. Of note, his two brothers  in the past month and [place] 2-3 weeks ago. He currently states his breathing is much more comfortable on the CPAP.  He denies fever, chills, n/V,abdominal pain, dysuria, palpitations, cough, congestion, myalgias, sick contacts, chest pain,      In ED,  VS  T 98, , /93, RR 20, Sat 94% on 2L NC  ABG - ( 21 Dec 2018 19:46 )  pH, Arterial: 7.30  pH, Blood: x     /  pCO2: 64    /  pO2: 190   / HCO3: 27    / Base Excess: 4.0   /  SaO2: 100     ABG revealed Acute on Chronic respiratory acidosis and pt was placed on BPAP. Then changed to CPAP 5 TV >400 by ICU PA  Labs:   Flu AB aand RSV neg  CXR - hyper inflation, No infiltrate      Received albuterol,  symbicort, spiriva, solumed 40.2,    In ED he was given steroids, lasix, azithromycin and started on bipap. 79 y/o M PMhx HTN, PVD, DM, Asthma, COPD ( on 2L home O2) c/b Chronic Hypoxemic Respiratory Failure, CABG, HLD, recent admission to [facility] Oct 2018 for hypercapnic respiratory failure c/b  cardiac arrest,  now presents to ED  w/ SOB and increased work of breathing x 1 day.  Per patient  he woke up with difficulty breathing, wife at that time took his blood pressure and the systolic was int he 200s, she gave him albuterol treatment but he continued to have Shortness of breath similar to asthma attacks.  Pt also states he has been unable to sleep with the home BIPAP mask, waking up the in the middle of the night and taking it off in attempt to sleep. Both nights he was unable to go back to sleep and has been very tired the past two days due to lack of sleep. Pt has been consistently using his 2L home O2 during the daytime and reports LA with climbing stairs, which is chronic for him. Of note, his two brothers  in the past month and he [place] 2-3 weeks ago. He currently states his breathing is much more comfortable on the CPAP.  He denies fever, chills, n/V,abdominal pain, dysuria, palpitations, cough, congestion, myalgias, sick contacts, chest pain,      In ED,  VS  T 98, , /93, RR 20, Sat 94% on 2L NC  EKG- Sinus Tach 100 with fusion complexes, no ST changes  ABG - ( 21 Dec 2018 19:46 )  pH, Arterial: 7.30  pH, Blood: x     /  pCO2: 64    /  pO2: 190   / HCO3: 27    / Base Excess: 4.0   /  SaO2: 100     ABG revealed Acute on Chronic respiratory acidosis and pt was placed on BPAP. Then changed to CPAP 5 TV >400 by ICU PA  Labs:   Flu AB aand RSV neg  CXR - hyper inflation, No infiltrate      Received albuterol,  symbicort, spiriva, solumed 40.2,    In ED he was given steroids, lasix, azithromycin and started on bipap. 77 y/o M PMhx HTN, PVD, DM, Asthma, COPD ( on 2L home O2) c/b Chronic Hypoxemic Respiratory Failure, CABG, HLD, recent admission to [facility] Oct 2018 for hypercapnic respiratory failure c/b  cardiac arrest,  now presents to ED  w/ SOB and increased work of breathing x 1 day.  Per patient  he woke up with difficulty breathing, wife at that time took his blood pressure and the systolic was int he 200s, she gave him albuterol treatment but he continued to have Shortness of breath similar to asthma attacks.  Pt also states he has been unable to sleep with the home BIPAP mask, waking up the in the middle of the night and taking it off in attempt to sleep. Both nights he was unable to go back to sleep and has been very tired the past two days due to lack of sleep. Pt has been consistently using his 2L home O2 during the daytime and reports LA with climbing stairs, which is chronic for him. Of note, his two brothers  in the past month and he took some [place] 2-3 weeks ago. He currently states his breathing is much more comfortable on the CPAP.  He denies fever, chills, n/V,abdominal pain, dysuria, palpitations, cough, congestion, myalgias, sick contacts, chest pain,      In ED,  VS  T 98, , /93, RR 20, Sat 94% on 2L NC  EKG- Sinus Tach 100 with fusion complexes, no ST changes  ABG - ( 21 Dec 2018 19:46 )  pH, Arterial: 7.30  pH, Blood: x     /  pCO2: 64    /  pO2: 190   / HCO3: 27    / Base Excess: 4.0   /  SaO2: 100     ABG revealed Acute on Chronic respiratory acidosis and pt was placed on BPAP. Then changed to CPAP 5 TV >400 by ICU PA  Labs: WBC shows Eos% 10 , Procal 0.05, CO2 33, Glu 181, Trop neg x 1, UA protein and hyalin casts  Flu AB aand RSV neg  CXR - hyper inflation, No infiltrate , similar to 2018  Received albuterol, solumedrol 40 IV, lasix 40 IV , Azithromycin, sublingual Nitroglycerin 77 y/o M PMhx HTN, PVD, DMT2, Asthma, COPD ( on 2L home O2) c/b Chronic Hypoxemic Respiratory Failure, CABG, HLD, recent admission to [facility] Oct 2018 for hypercapnic respiratory failure c/b  cardiac arrest,  now presents to ED  w/ SOB and increased work of breathing x 1 day.  Per patient  he woke up with difficulty breathing, wife at that time took his blood pressure and the systolic was int he 200s, she gave him albuterol treatment but he continued to have Shortness of breath similar to asthma attacks.  Pt also states he has been unable to sleep with the home BIPAP mask, waking up the in the middle of the night and taking it off in attempt to sleep. Both nights he was unable to go back to sleep and has been very tired the past two days due to lack of sleep. Pt has been consistently using his 2L home O2 during the daytime and reports LA with climbing stairs, which is chronic for him. Of note, his two brothers  in the past month and he took some long [place] 2-3 weeks ago. He currently states his breathing is much more comfortable on the CPAP.  He denies fever, chills, n/V,abdominal pain, dysuria, palpitations, cough, congestion, myalgias, sick contacts, chest pain,      In ED,  VS  T 98, , /93, RR 20, Sat 94% on 2L NC  EKG- Sinus Tach 100 with fusion complexes, no ST changes  ABG - ( 21 Dec 2018 19:46 )  pH, Arterial: 7.30  pH, Blood: x     /  pCO2: 64    /  pO2: 190   / HCO3: 27    / Base Excess: 4.0   /  SaO2: 100     ABG revealed Acute on Chronic respiratory acidosis and pt was placed on BPAP. Then changed to CPAP 5 TV >400 by ICU PA  Labs: WBC shows Eos% 10 , Procal 0.05, CO2 33, Glu 181, Trop neg x 1, UA protein and hyalin casts  Flu AB aand RSV neg  CXR - hyper inflation, No infiltrate , similar to 2018  Received albuterol, solumedrol 40 IV, lasix 40 IV , Azithromycin, sublingual Nitroglycerin 77 y/o M PMhx HTN, PVD, DMT2, Asthma, COPD ( on 2L home O2) c/b Chronic Hypoxemic Respiratory Failure, CABG, HLD, recent admission to [facility] Oct 2018 for hypercapnic respiratory failure c/b  cardiac arrest,  now presents to ED  w/ SOB and increased work of breathing x 1 day.  Per patient  he woke up with difficulty breathing, wife at that time took his blood pressure and the systolic was int he 200s, she gave him albuterol treatment but he continued to have Shortness of breath similar to asthma attacks.  Pt also states he has been unable to sleep with the home BIPAP mask, waking up the in the middle of the night and taking it off in attempt to sleep. Both nights he was unable to go back to sleep and has been very tired the past two days due to lack of sleep. Pt has been consistently using his 2L home O2 during the daytime and reports LA with climbing stairs, which is chronic for him. Of note, his two brothers  in the past month and he took some long [place] 2-3 weeks ago. He currently states his breathing is much more comfortable on the CPAP.  He denies fever, chills, n/V,abdominal pain, dysuria, palpitations, cough, congestion, myalgias, sick contacts, chest pain,      In ED,  VS  T 98, , /93, RR 20, Sat 94% on 2L NC  EKG- Sinus Tach 100 with fusion complexes, no ST changes  ABG - ( 21 Dec 2018 19:46 )  pH, Arterial: 7.30  pH, Blood: x     /  pCO2: 64    /  pO2: 190   / HCO3: 27    / Base Excess: 4.0   /  SaO2: 100     ABG revealed Acute on Chronic respiratory acidosis and pt was placed on BPAP. Then changed to CPAP 5 TV >400 by ICU PA  Labs: WBC shows Eos% 10 , Procal 0.05, CO2 33, Glu 181, Trop neg x 1, UA protein and hyalin casts  Flu AB aand RSV neg  CXR - hyper inflation, No infiltrate , similar to 2018  Received albuterol, solumedrol 40 IV, lasix 40 IV , Azithromycin, sublingual Nitroglycerin.    He was placed on BiPap then CPAP after ICU consulted.

## 2018-12-21 NOTE — H&P ADULT - NSHPSOCIALHISTORY_GEN_ALL_CORE
Lives at home with wife. Performs all ADLs.  Ambulates without assistance. No longer drives after Nov admission. history of smoking, Quit 30 years ago, 50 pack years, Denies etoh or drug use.

## 2018-12-21 NOTE — H&P ADULT - PROBLEM SELECTOR PLAN 3
- Stable  - Hold home Metformin and Glipizide  - Accuchecks, ISS, Carb Count Diet  - f/u HgA1c - Stable  - Hold home Metformin, in the setting of acidemia and Glipizide  - Accuchlittle, ISS, Carb Count Diet  - f/u HgA1c - Stable  - Hold home Metformin, in the setting of acidemia  hold glipizide  - Leyda, ISS, Carb Count Diet  - f/u HgA1c

## 2018-12-21 NOTE — ED ADULT NURSE NOTE - NSIMPLEMENTINTERV_GEN_ALL_ED
Implemented All Fall with Harm Risk Interventions:  Cord to call system. Call bell, personal items and telephone within reach. Instruct patient to call for assistance. Room bathroom lighting operational. Non-slip footwear when patient is off stretcher. Physically safe environment: no spills, clutter or unnecessary equipment. Stretcher in lowest position, wheels locked, appropriate side rails in place. Provide visual cue, wrist band, yellow gown, etc. Monitor gait and stability. Monitor for mental status changes and reorient to person, place, and time. Review medications for side effects contributing to fall risk. Reinforce activity limits and safety measures with patient and family. Provide visual clues: red socks.

## 2018-12-22 DIAGNOSIS — I10 ESSENTIAL (PRIMARY) HYPERTENSION: ICD-10-CM

## 2018-12-22 DIAGNOSIS — N40.1 BENIGN PROSTATIC HYPERPLASIA WITH LOWER URINARY TRACT SYMPTOMS: ICD-10-CM

## 2018-12-22 DIAGNOSIS — E11.9 TYPE 2 DIABETES MELLITUS WITHOUT COMPLICATIONS: ICD-10-CM

## 2018-12-22 DIAGNOSIS — I25.10 ATHEROSCLEROTIC HEART DISEASE OF NATIVE CORONARY ARTERY WITHOUT ANGINA PECTORIS: ICD-10-CM

## 2018-12-22 DIAGNOSIS — Z29.9 ENCOUNTER FOR PROPHYLACTIC MEASURES, UNSPECIFIED: ICD-10-CM

## 2018-12-22 DIAGNOSIS — J44.1 CHRONIC OBSTRUCTIVE PULMONARY DISEASE WITH (ACUTE) EXACERBATION: ICD-10-CM

## 2018-12-22 LAB
ALBUMIN SERPL ELPH-MCNC: 3.6 G/DL — SIGNIFICANT CHANGE UP (ref 3.3–5)
ALP SERPL-CCNC: 86 U/L — SIGNIFICANT CHANGE UP (ref 40–120)
ALT FLD-CCNC: 28 U/L — SIGNIFICANT CHANGE UP (ref 12–78)
ANION GAP SERPL CALC-SCNC: 5 MMOL/L — SIGNIFICANT CHANGE UP (ref 5–17)
AST SERPL-CCNC: 14 U/L — LOW (ref 15–37)
BASE EXCESS BLDA CALC-SCNC: 6.2 MMOL/L — HIGH (ref -2–2)
BILIRUB SERPL-MCNC: 0.4 MG/DL — SIGNIFICANT CHANGE UP (ref 0.2–1.2)
BLOOD GAS COMMENTS ARTERIAL: SIGNIFICANT CHANGE UP
BLOOD GAS COMMENTS ARTERIAL: SIGNIFICANT CHANGE UP
BUN SERPL-MCNC: 14 MG/DL — SIGNIFICANT CHANGE UP (ref 7–23)
CALCIUM SERPL-MCNC: 9.3 MG/DL — SIGNIFICANT CHANGE UP (ref 8.5–10.1)
CHLORIDE SERPL-SCNC: 102 MMOL/L — SIGNIFICANT CHANGE UP (ref 96–108)
CO2 SERPL-SCNC: 34 MMOL/L — HIGH (ref 22–31)
CREAT SERPL-MCNC: 1.1 MG/DL — SIGNIFICANT CHANGE UP (ref 0.5–1.3)
CULTURE RESULTS: SIGNIFICANT CHANGE UP
GLUCOSE SERPL-MCNC: 218 MG/DL — HIGH (ref 70–99)
HCO3 BLDA-SCNC: 29 MMOL/L — HIGH (ref 23–27)
HCT VFR BLD CALC: 40.6 % — SIGNIFICANT CHANGE UP (ref 39–50)
HGB BLD-MCNC: 12.6 G/DL — LOW (ref 13–17)
HOROWITZ INDEX BLDA+IHG-RTO: 30 — SIGNIFICANT CHANGE UP
INR BLD: 1.14 RATIO — SIGNIFICANT CHANGE UP (ref 0.88–1.16)
MCHC RBC-ENTMCNC: 27.8 PG — SIGNIFICANT CHANGE UP (ref 27–34)
MCHC RBC-ENTMCNC: 31 GM/DL — LOW (ref 32–36)
MCV RBC AUTO: 89.4 FL — SIGNIFICANT CHANGE UP (ref 80–100)
NRBC # BLD: 0 /100 WBCS — SIGNIFICANT CHANGE UP (ref 0–0)
PCO2 BLDA: 59 MMHG — HIGH (ref 32–46)
PH BLDA: 7.35 — SIGNIFICANT CHANGE UP (ref 7.35–7.45)
PHOSPHATE SERPL-MCNC: 2.9 MG/DL — SIGNIFICANT CHANGE UP (ref 2.5–4.5)
PLATELET # BLD AUTO: 303 K/UL — SIGNIFICANT CHANGE UP (ref 150–400)
PO2 BLDA: 84 MMHG — SIGNIFICANT CHANGE UP (ref 74–108)
POTASSIUM SERPL-MCNC: 4.6 MMOL/L — SIGNIFICANT CHANGE UP (ref 3.5–5.3)
POTASSIUM SERPL-SCNC: 4.6 MMOL/L — SIGNIFICANT CHANGE UP (ref 3.5–5.3)
PROCALCITONIN SERPL-MCNC: <0.05 NG/ML — HIGH (ref 0–0.04)
PROT SERPL-MCNC: 6.8 G/DL — SIGNIFICANT CHANGE UP (ref 6–8.3)
PROTHROM AB SERPL-ACNC: 13 SEC — HIGH (ref 10–12.9)
RBC # BLD: 4.54 M/UL — SIGNIFICANT CHANGE UP (ref 4.2–5.8)
RBC # FLD: 13.6 % — SIGNIFICANT CHANGE UP (ref 10.3–14.5)
SAO2 % BLDA: 96 % — SIGNIFICANT CHANGE UP (ref 92–96)
SODIUM SERPL-SCNC: 141 MMOL/L — SIGNIFICANT CHANGE UP (ref 135–145)
SPECIMEN SOURCE: SIGNIFICANT CHANGE UP
TROPONIN I SERPL-MCNC: <.015 NG/ML — SIGNIFICANT CHANGE UP (ref 0.01–0.04)
WBC # BLD: 6.39 K/UL — SIGNIFICANT CHANGE UP (ref 3.8–10.5)
WBC # FLD AUTO: 6.39 K/UL — SIGNIFICANT CHANGE UP (ref 3.8–10.5)

## 2018-12-22 PROCEDURE — 93970 EXTREMITY STUDY: CPT | Mod: 26

## 2018-12-22 PROCEDURE — 99233 SBSQ HOSP IP/OBS HIGH 50: CPT

## 2018-12-22 RX ORDER — HYDRALAZINE HCL 50 MG
10 TABLET ORAL EVERY 8 HOURS
Qty: 0 | Refills: 0 | Status: DISCONTINUED | OUTPATIENT
Start: 2018-12-22 | End: 2018-12-24

## 2018-12-22 RX ORDER — ATORVASTATIN CALCIUM 80 MG/1
40 TABLET, FILM COATED ORAL AT BEDTIME
Qty: 0 | Refills: 0 | Status: DISCONTINUED | OUTPATIENT
Start: 2018-12-22 | End: 2018-12-24

## 2018-12-22 RX ORDER — PANTOPRAZOLE SODIUM 20 MG/1
40 TABLET, DELAYED RELEASE ORAL
Qty: 0 | Refills: 0 | Status: DISCONTINUED | OUTPATIENT
Start: 2018-12-22 | End: 2018-12-24

## 2018-12-22 RX ADMIN — Medication 40 MILLIGRAM(S): at 17:17

## 2018-12-22 RX ADMIN — ALBUTEROL 2.5 MILLIGRAM(S): 90 AEROSOL, METERED ORAL at 12:27

## 2018-12-22 RX ADMIN — Medication 2: at 12:31

## 2018-12-22 RX ADMIN — Medication 25 MILLIGRAM(S): at 14:33

## 2018-12-22 RX ADMIN — AZITHROMYCIN 255 MILLIGRAM(S): 500 TABLET, FILM COATED ORAL at 01:17

## 2018-12-22 RX ADMIN — Medication 81 MILLIGRAM(S): at 12:33

## 2018-12-22 RX ADMIN — LOSARTAN POTASSIUM 100 MILLIGRAM(S): 100 TABLET, FILM COATED ORAL at 06:48

## 2018-12-22 RX ADMIN — ENOXAPARIN SODIUM 40 MILLIGRAM(S): 100 INJECTION SUBCUTANEOUS at 12:29

## 2018-12-22 RX ADMIN — ALBUTEROL 2.5 MILLIGRAM(S): 90 AEROSOL, METERED ORAL at 16:04

## 2018-12-22 RX ADMIN — TAMSULOSIN HYDROCHLORIDE 0.4 MILLIGRAM(S): 0.4 CAPSULE ORAL at 21:12

## 2018-12-22 RX ADMIN — ALBUTEROL 2.5 MILLIGRAM(S): 90 AEROSOL, METERED ORAL at 19:01

## 2018-12-22 RX ADMIN — BUDESONIDE AND FORMOTEROL FUMARATE DIHYDRATE 2 PUFF(S): 160; 4.5 AEROSOL RESPIRATORY (INHALATION) at 09:00

## 2018-12-22 RX ADMIN — BUDESONIDE AND FORMOTEROL FUMARATE DIHYDRATE 2 PUFF(S): 160; 4.5 AEROSOL RESPIRATORY (INHALATION) at 21:12

## 2018-12-22 RX ADMIN — ATORVASTATIN CALCIUM 40 MILLIGRAM(S): 80 TABLET, FILM COATED ORAL at 21:07

## 2018-12-22 RX ADMIN — Medication 2: at 17:17

## 2018-12-22 RX ADMIN — Medication 40 MILLIGRAM(S): at 06:48

## 2018-12-22 RX ADMIN — ALBUTEROL 2.5 MILLIGRAM(S): 90 AEROSOL, METERED ORAL at 22:32

## 2018-12-22 RX ADMIN — Medication 2: at 06:48

## 2018-12-22 RX ADMIN — ALBUTEROL 2.5 MILLIGRAM(S): 90 AEROSOL, METERED ORAL at 07:44

## 2018-12-22 RX ADMIN — ALBUTEROL 2.5 MILLIGRAM(S): 90 AEROSOL, METERED ORAL at 02:14

## 2018-12-22 RX ADMIN — Medication 25 MILLIGRAM(S): at 06:48

## 2018-12-22 RX ADMIN — CLOPIDOGREL BISULFATE 75 MILLIGRAM(S): 75 TABLET, FILM COATED ORAL at 12:42

## 2018-12-22 RX ADMIN — TIOTROPIUM BROMIDE 1 CAPSULE(S): 18 CAPSULE ORAL; RESPIRATORY (INHALATION) at 09:00

## 2018-12-22 RX ADMIN — PANTOPRAZOLE SODIUM 40 MILLIGRAM(S): 20 TABLET, DELAYED RELEASE ORAL at 06:48

## 2018-12-22 RX ADMIN — Medication 25 MILLIGRAM(S): at 21:07

## 2018-12-22 NOTE — PROGRESS NOTE ADULT - SUBJECTIVE AND OBJECTIVE BOX
Patient examined Chart reviewed Detailed note will be inserted below after today's data is processed Meanwhile continue management as outlined in my previous note Off bpap Used bpap last night States is feeling better Patient examined Chart reviewed Detailed note will be inserted below after today's data is processed Meanwhile continue management as outlined in my previous note Off bpap Used bpap last night States is feeling better        COMMODORE TURNER [facility] P    ALLERGY Shellfish  CONTACT Sp Amira C    REASON FOR VISIT Subsequent pulm fu   Initial evaluation/Pulmonary consultation requested  12/21/2018 from Dr Dejesus   Patient examined chart reviewed  HOSPITAL ADMISSION [facility] Dr Gregory Reyes   PATIENT CAME  FROM (if information available)      VITALS/LABS                           12/22/2018 88 146/80 18 100%   12/22/2018 W 6.3 Hb 12.6 Plt 303  Na 141 K 4.6 CO2 34 Cr 1.1   12/22/2018 5a .3/10/6 735/59/84     GLOBAL ISSUE/BEST PRACTICE:      PROBLEM: HOB elevation:   y            PROBLEM: Stress ulcer proph:                      PROBLEM: VTE prophylaxis:      Chlorhexidine .12%    PROBLEM: Glycemic control:    na  PROBLEM: Nutrition:      PROBLEM: Advanced directive: na     PROBLEM: Allergies:  shellfish  PROCEDURE 10/29/2018 EXtubation   PEROCEDURE 10/26/2018 ET intubation    REVIEW OF SYMPTOMS    Able to give ROS  Yes     RELIABLE No   CONSTITUTIONAL Weakness Yes  Chills No Vision changes No  ENDOCRINE No unexplained hair loss No heat or cold intolerance    ALLERGY No hives  Sore throat No   RESP Coughing blood no  Shortness of breath YES   NEURO No Headache  Confusion Pain neck No   CARDIAC No Chest pain No Palpitations   GI No Pain abdomen NO   Vomiting NO     PHYSICAL EXAM    HEENT Unremarkable PERRLA atraumatic   RESP Fair air entry EXP prolonged    Harsh breath sound Resp distres mild   CARDIAC S1 S2 No S3     NO JVD    ABDOMEN SOFT BS PRESENT NOT DISTENDED No hepatosplenomegaly PEDAL EDEMA present No calf tenderness  NO rash   GENERAL Not TOXIC looking    PROBLEM SPECIFIC PATIENT DATA PATIENT [employer] TURNER 12/21/2018  ADMISSION [facility] DR GREGORY REYES  79 y/o male pmhx HTN, PVD, DM, Asthma, COPD on 2L home O2, hx CABGx3, HLD presented to ED 12/21/2018  w/ SOB and increased work of breathing. Pateint was neg for flu and rsv (12/21/2018) and CXR showed just hyperinflation ABG revealed ac on chr resp acidosis and pt was placed on BPAP admitted [facility] 12/21/2018 and Pulm consulted   COPD ex albuterol (12/21) symbicort (12/21) spiriva (12/21) solumed 40.2 (12/21)   RO RESP TRACT INFECTION 12/22/2018 prct n 12/21/2018 Flu AB aand RSV n 12/21/2018 W 7.9  12/21/2018 CXR No infiltr Azithro (12/21)   DM Sl scale (12/21)   RESP  12/22/2018 5a .3/10/6 735/59/84 12/21/2018 bpap 12/6/.5 730/64/190   RO MI 12/21-12/22/2018 Tr 1 n.2     ASSESSMENT RECOMMENDATIONSPATIENT COMMODORE YUE 12/21/2018  ADMISSION [facility] P DR GREGORY REYES  COPD ex BD Stroid Will taper steroids in am  RO RESP TRACT INFECTION  Azithro (12/21) for antiinflammatory and immunomodulat   DM Sl scale (12/21)   RESP  12/21/2018 bpap 12/6/.5 730/64/190 Monitor abg 12/22/2018 Improved   RO MI 12/21-12/22/2018 Tr 1 n.2     TIME SPENT Over 25 minutes aggregate care time spent on encounter; activities included   direct patient care, counseling and/or coordinating care reviewing notes, lab data/ imaging , discussion with multidisciplinary team/ patient  /family. Risks, benefits, alternatives  discussed in detail.

## 2018-12-22 NOTE — PROGRESS NOTE ADULT - SUBJECTIVE AND OBJECTIVE BOX
Patient is a 78y old  Male who presents with a chief complaint of Acute on Chronic Respiratory Failure (22 Dec 2018 08:13)      FROM ADMISSION H+P:   HPI:  77 y/o M PMhx HTN, PVD, DMT2, Asthma, COPD ( on 2L home O2) c/b Chronic Hypoxemic Respiratory Failure, CABG, HLD, recent admission to [facility] Oct 2018 for hypercapnic respiratory failure c/b  cardiac arrest, now presents to ED w/ SOB and increased work of breathing x 1 day. Per patient, he woke up with difficulty breathing, wife at that time took his blood pressure and the systolic was int he 200s, she gave him albuterol treatment but he continued to have Shortness of breath similar to asthma attacks.  Pt also states he has been unable to sleep with the home BIPAP mask, waking up the in the middle of the night and taking it off in attempt to sleep. Both nights he was unable to go back to sleep and has been very tired the past two days due to lack of sleep. Pt has been consistently using his 2L home O2 during the daytime and reports LA with climbing stairs, which is chronic for him. Of note, his two brothers  in the past month and he [place] some [place] 2-3 weeks ago. He currently states his breathing is much more comfortable on the CPAP.  He denies fever, chills, n/V,abdominal pain, dysuria, palpitations, cough, congestion, myalgias, sick contacts, chest pain,      In ED,  VS  T 98, , /93, RR 20, Sat 94% on 2L NC  EKG- Sinus Tach 100 with fusion complexes, no ST changes  ABG - ( 21 Dec 2018 19:46 )  pH, Arterial: 7.30  pH, Blood: x     /  pCO2: 64    /  pO2: 190   / HCO3: 27    / Base Excess: 4.0   /  SaO2: 100     ABG revealed Acute on Chronic respiratory acidosis and pt was placed on BPAP. Then changed to CPAP 5 TV >400 by ICU PA  Labs: WBC shows Eos% 10 , Procal 0.05, CO2 33, Glu 181, Trop neg x 1, UA protein and hyalin casts  Flu AB aand RSV neg  CXR - hyper inflation, No infiltrate , similar to 2018  Received albuterol, solumedrol 40 IV, lasix 40 IV , Azithromycin, sublingual Nitroglycerin.    He was placed on BiPap then CPAP after ICU consulted. (21 Dec 2018 23:39)      ----  INTERVAL HPI/OVERNIGHT EVENTS: Pt seen and evaluated at the bedside. No acute overnight events occurred. Pt was on bipap/cpap overnight and weaned to nasal cannula. He appears to be tolerating nasal cannula now. When I entered room, the pt was resting comfortably with nasal cannula, no audible snowing, even respirations, no acute distress. Pt promptly responded to verbal stimuli. Verbalized no focal complaints. No pain. No coughing. No chest congestion but did cough a bit when he sat up for my examination. He feels hungry. No reflux symptoms. No constipation or HA. No anxiety/agitaiton. No further complaints. No family @ bedside.     ----  PAST MEDICAL & SURGICAL HISTORY:  PVD (peripheral vascular disease)  Hypertension  COPD (chronic obstructive pulmonary disease)  Osteomyelitis  Dyslipidemia  CAD (Coronary Artery Disease)  Diabetes Mellitus, Type II  CABG (Coronary Artery Bypass Graft)      FAMILY HISTORY:  Family history of diabetes mellitus (Sibling)      ----  MEDICATIONS  (STANDING):  ALBUTerol    0.083% 2.5 milliGRAM(s) Nebulizer every 4 hours  aspirin enteric coated 81 milliGRAM(s) Oral daily  atorvastatin 40 milliGRAM(s) Oral at bedtime  azithromycin  IVPB 500 milliGRAM(s) IV Intermittent every 24 hours  azithromycin  IVPB      buDESOnide 160 MICROgram(s)/formoterol 4.5 MICROgram(s) Inhaler 2 Puff(s) Inhalation two times a day  clopidogrel Tablet 75 milliGRAM(s) Oral daily  dextrose 5%. 1000 milliLiter(s) (50 mL/Hr) IV Continuous <Continuous>  dextrose 50% Injectable 12.5 Gram(s) IV Push once  dextrose 50% Injectable 25 Gram(s) IV Push once  dextrose 50% Injectable 25 Gram(s) IV Push once  enoxaparin Injectable 40 milliGRAM(s) SubCutaneous daily  insulin lispro (HumaLOG) corrective regimen sliding scale   SubCutaneous three times a day before meals  losartan 100 milliGRAM(s) Oral daily  methylPREDNISolone sodium succinate Injectable 40 milliGRAM(s) IV Push every 12 hours  metoprolol tartrate 25 milliGRAM(s) Oral every 8 hours  pantoprazole    Tablet 40 milliGRAM(s) Oral before breakfast  tamsulosin 0.4 milliGRAM(s) Oral at bedtime  tiotropium 18 MICROgram(s) Capsule 1 Capsule(s) Inhalation daily    MEDICATIONS  (PRN):  dextrose 40% Gel 15 Gram(s) Oral once PRN Blood Glucose LESS THAN 70 milliGRAM(s)/deciliter  glucagon  Injectable 1 milliGRAM(s) IntraMuscular once PRN Glucose LESS THAN 70 milligrams/deciliter      ----  REVIEW OF SYSTEMS:  CONSTITUTIONAL: denies fever, chills, admits sleeping well  HEENT: denies blurred vision, sore throat  SKIN: denies new lesions, rash  CARDIOVASCULAR: denies chest pain, chest pressure, palpitations  RESPIRATORY: as per HPI  GASTROINTESTINAL: denies nausea, vomiting, diarrhea, abdominal pain  GENITOURINARY: denies dysuria, discharge  NEUROLOGICAL: denies numbness, headache, focal weakness  MUSCULOSKELETAL: denies new joint pain, muscle aches  HEMATOLOGIC: denies gross bleeding, bruising  LYMPHATICS: denies enlarged lymph nodes, extremity swelling  ENDOCRINOLOGIC: denies sweating, cold or heat intolerance    ----  PHYSICAL EXAM:  GENERAL: patient appears comfortable, tolerating nasal cannula, no acute distress, conversational, speaking in full sentences  EYES: sclera clear, no exudates  ENMT: oropharynx clear without erythema, no exudates, moist mucous membranes  NECK: supple, soft, no thyromegaly noted  LUNGS: good air entry bilaterally, coarse breath sounds b/l w/ prolonged but mild expiratory wheezes in b/l upper chest, no focal rhonchi appreciated, no rales in the bases  HEART: soft S1/S2, regular rate and rhythm, distant heart sounds, no lower extremity edema  GASTROINTESTINAL: abdomen is soft, nontender, nondistended, normoactive bowel sounds, no palpable masses  INTEGUMENT: good skin turgor, no lesions noted  MUSCULOSKELETAL: no clubbing or cyanosis, no obvious deformity  NEUROLOGIC: awake, alert, oriented x3, good muscle tone in 4 extremities, no obvious sensory deficits  HEME/LYMPH: no palpable supraclavicular nodules, no obvious ecchymosis or petechiae     T(C): 36.7 (18 @ 18:47), Max: 36.7 (18 @ 18:47)  HR: 92 (18 @ 07:46) (80 - 120)  BP: 146/80 (18 @ 06:50) (127/62 - 192/93)  RR: 18 (18 @ 06:50) (18 - 25)  SpO2: 100% (18 @ 07:46) (94% - 100%)  Wt(kg): --    ----  I&O's Summary      LABS:                        12.6   6.39  )-----------( 303      ( 22 Dec 2018 05:52 )             40.6         141  |  102  |  14  ----------------------------<  218<H>  4.6   |  34<H>  |  1.10    Ca    9.3      22 Dec 2018 05:52  Phos  2.9       Mg     1.7         TPro  6.8  /  Alb  3.6  /  TBili  0.4  /  DBili  x   /  AST  14<L>  /  ALT  28  /  AlkPhos  86      PT/INR - ( 22 Dec 2018 05:52 )   PT: 13.0 sec;   INR: 1.14 ratio         PTT - ( 21 Dec 2018 19:18 )  PTT:32.3 sec  Urinalysis Basic - ( 21 Dec 2018 20:46 )    Color: Yellow / Appearance: Clear / S.010 / pH: x  Gluc: x / Ketone: Negative  / Bili: Negative / Urobili: Negative   Blood: x / Protein: 25 mg/dL / Nitrite: Negative   Leuk Esterase: Negative / RBC: Negative /HPF / WBC 0-2   Sq Epi: x / Non Sq Epi: Occasional / Bacteria: Negative      CAPILLARY BLOOD GLUCOSE      POCT Blood Glucose.: 221 mg/dL (22 Dec 2018 06:43)    ABG - ( 22 Dec 2018 06:11 )  pH, Arterial: 7.35  pH, Blood: x     /  pCO2: 59    /  pO2: 84    / HCO3: 29    / Base Excess: 6.2   /  SaO2: 96                          ----  Personally reviewed:  Vital sign trends: [ x ] yes    [  ] no     [  ] n/a  Laboratory results: [ x ] yes    [  ] no     [  ] n/a  Radiology results: [ x] yes    [  ] no     [  ] n/a - cxr  Culture results: [  ] yes    [  ] no     [ x ] n/a  Consultant recommendations: [ x ] yes    [  ] no     [  ] n/a

## 2018-12-23 ENCOUNTER — TRANSCRIPTION ENCOUNTER (OUTPATIENT)
Age: 78
End: 2018-12-23

## 2018-12-23 LAB
ANION GAP SERPL CALC-SCNC: 4 MMOL/L — LOW (ref 5–17)
BUN SERPL-MCNC: 21 MG/DL — SIGNIFICANT CHANGE UP (ref 7–23)
CALCIUM SERPL-MCNC: 9.2 MG/DL — SIGNIFICANT CHANGE UP (ref 8.5–10.1)
CHLORIDE SERPL-SCNC: 102 MMOL/L — SIGNIFICANT CHANGE UP (ref 96–108)
CO2 SERPL-SCNC: 37 MMOL/L — HIGH (ref 22–31)
CREAT SERPL-MCNC: 1.2 MG/DL — SIGNIFICANT CHANGE UP (ref 0.5–1.3)
GLUCOSE SERPL-MCNC: 148 MG/DL — HIGH (ref 70–99)
HCT VFR BLD CALC: 40.3 % — SIGNIFICANT CHANGE UP (ref 39–50)
HGB BLD-MCNC: 12.2 G/DL — LOW (ref 13–17)
MAGNESIUM SERPL-MCNC: 1.9 MG/DL — SIGNIFICANT CHANGE UP (ref 1.6–2.6)
MCHC RBC-ENTMCNC: 27.9 PG — SIGNIFICANT CHANGE UP (ref 27–34)
MCHC RBC-ENTMCNC: 30.3 GM/DL — LOW (ref 32–36)
MCV RBC AUTO: 92 FL — SIGNIFICANT CHANGE UP (ref 80–100)
NRBC # BLD: 0 /100 WBCS — SIGNIFICANT CHANGE UP (ref 0–0)
PLATELET # BLD AUTO: 304 K/UL — SIGNIFICANT CHANGE UP (ref 150–400)
POTASSIUM SERPL-MCNC: 4.4 MMOL/L — SIGNIFICANT CHANGE UP (ref 3.5–5.3)
POTASSIUM SERPL-SCNC: 4.4 MMOL/L — SIGNIFICANT CHANGE UP (ref 3.5–5.3)
RBC # BLD: 4.38 M/UL — SIGNIFICANT CHANGE UP (ref 4.2–5.8)
RBC # FLD: 13.7 % — SIGNIFICANT CHANGE UP (ref 10.3–14.5)
SODIUM SERPL-SCNC: 143 MMOL/L — SIGNIFICANT CHANGE UP (ref 135–145)
WBC # BLD: 12.2 K/UL — HIGH (ref 3.8–10.5)
WBC # FLD AUTO: 12.2 K/UL — HIGH (ref 3.8–10.5)

## 2018-12-23 PROCEDURE — 99233 SBSQ HOSP IP/OBS HIGH 50: CPT

## 2018-12-23 RX ORDER — PANTOPRAZOLE SODIUM 20 MG/1
1 TABLET, DELAYED RELEASE ORAL
Qty: 0 | Refills: 0 | COMMUNITY
Start: 2018-12-23

## 2018-12-23 RX ADMIN — AZITHROMYCIN 255 MILLIGRAM(S): 500 TABLET, FILM COATED ORAL at 23:30

## 2018-12-23 RX ADMIN — BUDESONIDE AND FORMOTEROL FUMARATE DIHYDRATE 2 PUFF(S): 160; 4.5 AEROSOL RESPIRATORY (INHALATION) at 21:28

## 2018-12-23 RX ADMIN — Medication 40 MILLIGRAM(S): at 17:21

## 2018-12-23 RX ADMIN — AZITHROMYCIN 255 MILLIGRAM(S): 500 TABLET, FILM COATED ORAL at 01:58

## 2018-12-23 RX ADMIN — CLOPIDOGREL BISULFATE 75 MILLIGRAM(S): 75 TABLET, FILM COATED ORAL at 12:29

## 2018-12-23 RX ADMIN — ALBUTEROL 2.5 MILLIGRAM(S): 90 AEROSOL, METERED ORAL at 03:15

## 2018-12-23 RX ADMIN — ALBUTEROL 2.5 MILLIGRAM(S): 90 AEROSOL, METERED ORAL at 22:44

## 2018-12-23 RX ADMIN — BUDESONIDE AND FORMOTEROL FUMARATE DIHYDRATE 2 PUFF(S): 160; 4.5 AEROSOL RESPIRATORY (INHALATION) at 06:32

## 2018-12-23 RX ADMIN — ALBUTEROL 2.5 MILLIGRAM(S): 90 AEROSOL, METERED ORAL at 19:24

## 2018-12-23 RX ADMIN — Medication 25 MILLIGRAM(S): at 21:29

## 2018-12-23 RX ADMIN — Medication 3: at 12:29

## 2018-12-23 RX ADMIN — ALBUTEROL 2.5 MILLIGRAM(S): 90 AEROSOL, METERED ORAL at 08:34

## 2018-12-23 RX ADMIN — Medication 40 MILLIGRAM(S): at 06:22

## 2018-12-23 RX ADMIN — ENOXAPARIN SODIUM 40 MILLIGRAM(S): 100 INJECTION SUBCUTANEOUS at 12:29

## 2018-12-23 RX ADMIN — ATORVASTATIN CALCIUM 40 MILLIGRAM(S): 80 TABLET, FILM COATED ORAL at 21:29

## 2018-12-23 RX ADMIN — ALBUTEROL 2.5 MILLIGRAM(S): 90 AEROSOL, METERED ORAL at 12:49

## 2018-12-23 RX ADMIN — TIOTROPIUM BROMIDE 1 CAPSULE(S): 18 CAPSULE ORAL; RESPIRATORY (INHALATION) at 06:33

## 2018-12-23 RX ADMIN — Medication 81 MILLIGRAM(S): at 12:29

## 2018-12-23 RX ADMIN — PANTOPRAZOLE SODIUM 40 MILLIGRAM(S): 20 TABLET, DELAYED RELEASE ORAL at 06:22

## 2018-12-23 RX ADMIN — Medication 2: at 17:27

## 2018-12-23 RX ADMIN — LOSARTAN POTASSIUM 100 MILLIGRAM(S): 100 TABLET, FILM COATED ORAL at 06:22

## 2018-12-23 RX ADMIN — Medication 25 MILLIGRAM(S): at 14:35

## 2018-12-23 RX ADMIN — TAMSULOSIN HYDROCHLORIDE 0.4 MILLIGRAM(S): 0.4 CAPSULE ORAL at 21:29

## 2018-12-23 RX ADMIN — ALBUTEROL 2.5 MILLIGRAM(S): 90 AEROSOL, METERED ORAL at 15:41

## 2018-12-23 RX ADMIN — Medication 25 MILLIGRAM(S): at 06:22

## 2018-12-23 NOTE — DIETITIAN INITIAL EVALUATION ADULT. - ADHERENCE
Pt states that he tries to follow a low salt, consistent carbohydrate diet PTA. Wife ensures good adherence to diet aside from occasional meals (pt likes pancakes, sausage, stern, desserts). She checks his fingersticks 1-2x daily with values usually running  mg/dl. On metformin and glipizide PTA. Last HgbA1c noted in chart 6.2% (10/27/18).

## 2018-12-23 NOTE — DISCHARGE NOTE ADULT - DURABLE MEDICAL EQUIPMENT AGENCY
you are going home with your portable O2. You already have home O2 prior to this admission. You have a rolling walker and a cane at home.

## 2018-12-23 NOTE — DIETITIAN INITIAL EVALUATION ADULT. - OTHER INFO
Pt seen for nutrition referral for pressure injury > stage 2 (pt only with healed unstageable pressure injury on coccyx). Per chart, pt is 79 Y/O M with PMH of HTN, PVD, type 2 DM, COPD, CABG, HLD admitted for acute on chronic respiratory failure with hypercapnia. Per wife, pt with good appetite this AM (consumed 50-75% of breakfast). Denied any recent change in wt, currently 215 pounds stable (compared with previous RDN notes). No reported N/V/diarrhea/constipation, last BM today 12/23. No reported difficulties chewing/swallowing, NKFA.

## 2018-12-23 NOTE — DISCHARGE NOTE ADULT - CARE PLAN
Principal Discharge DX:	COPD exacerbation  Goal:	Improve symptoms  Assessment and plan of treatment:	- Please follow up with Dr. Dejesus in 1-2wks in his office  - Please continue steroid taper as detailed Principal Discharge DX:	COPD exacerbation  Goal:	Improve symptoms  Assessment and plan of treatment:	- Please follow up with Dr. Dejesus in 1-2wks in his office  - Please continue steroid taper as detailed  -Continue home inhalers  Secondary Diagnosis:	CAD (Coronary Artery Disease)  Assessment and plan of treatment:	Continue home meds  F/u with your cardiologist  Secondary Diagnosis:	Diabetes Mellitus, Type II  Assessment and plan of treatment:	Continue home meds  F/u with your PCP  HbA1C 6.2  Secondary Diagnosis:	Dyslipidemia  Assessment and plan of treatment:	Continue home med  f/u with your cardiologist  Secondary Diagnosis:	Essential hypertension  Assessment and plan of treatment:	Continue home meds  F/u with your PCP  Secondary Diagnosis:	Benign prostatic hyperplasia with lower urinary tract symptoms, symptom details unspecified  Assessment and plan of treatment:	Continue Flomax  F/u with your doctor

## 2018-12-23 NOTE — PROGRESS NOTE ADULT - PROBLEM SELECTOR PLAN 1
Acute respiratory failure, acidosis with hypercapnia and hypoxia  - Likely viral induced? possible virus not covered by the current RVPanel  - Pulm Dr Dejesus consulted  - on IV corticoteroids  - tolerating nasal cannula today, will reassess throughout the day today  - Continue Albuterol Neb 2.5 q4 hours  - Continue Budesonide/ formoterol BID  - cultures pending  - on empiric azithromycin, agree w/ this for now, will limit to short course (day 2 of 3), procal is negative
Acute respiratory failure, acidosis with hypercapnia and hypoxia  - Likely viral induced? possible virus not covered by the current RVPanel  - Pulm Dr Dejesus consulted  - on IV corticoteroids, last dose of 40mg IV solu-medrol tonight, start po steroids tomorrow  - tolerating nasal cannula today, will reassess throughout the day today  - Continue Albuterol Neb 2.5 q4 hours  - Continue Budesonide/ formoterol BID  - cultures ngtd  - on empiric azithromycin, agree w/ this for now, will limit to short course (day 3 of 3), procal is negative (last dose scheduled for tonight around midnight)

## 2018-12-23 NOTE — PROGRESS NOTE ADULT - PROBLEM/PLAN-2
Patient for the past 3 days whenever she has a BM and bears down has bright fresh vaginal bleeding, she put on a pad and then as minimal to none through out the day, has been experiencing left sided and low back dull ache pain, feeling slightly nauseous today, period not due for 2 weeks, call transferred to schedule, patient informed to go to Urgent care if soaking more than a pad an hour  
DISPLAY PLAN FREE TEXT
DISPLAY PLAN FREE TEXT

## 2018-12-23 NOTE — PROGRESS NOTE ADULT - PROBLEM SELECTOR PLAN 4
- Chronic  - stable, s/p CABG   - Continue ASA 81 mg daily  - Continue Clopidogrel 75 mg daily  - Continue Atorvastatin 40 mg daily
- Chronic  - stable, s/p CABG   - Continue ASA 81 mg daily  - Continue Clopidogrel 75 mg daily  - Continue Atorvastatin 40 mg daily

## 2018-12-23 NOTE — PROGRESS NOTE ADULT - SUBJECTIVE AND OBJECTIVE BOX
Patient is a 78y old  Male who presents with a chief complaint of Acute on Chronic Respiratory Failure (23 Dec 2018 11:20)      FROM ADMISSION H+P:   HPI:  77 y/o M PMhx HTN, PVD, DMT2, Asthma, COPD ( on 2L home O2) c/b Chronic Hypoxemic Respiratory Failure, CABG, HLD, recent admission to \[facility]\"" Oct 2018 for hypercapnic respiratory failure c/b  cardiac arrest,  now presents to ED  w/ SOB and increased work of breathing x 1 day.  Per patient  he woke up with difficulty breathing, wife at that time took his blood pressure and the systolic was int he 200s, she gave him albuterol treatment but he continued to have Shortness of breath similar to asthma attacks.  Pt also states he has been unable to sleep with the home BIPAP mask, waking up the in the middle of the night and taking it off in attempt to sleep. Both nights he was unable to go back to sleep and has been very tired the past two days due to lack of sleep. Pt has been consistently using his 2L home O2 during the daytime and reports LA with climbing stairs, which is chronic for him. Of note, his two brothers  in the past month and he [place] 2-3 weeks ago. He currently states his breathing is much more comfortable on the CPAP.  He denies fever, chills, n/V,abdominal pain, dysuria, palpitations, cough, congestion, myalgias, sick contacts, chest pain,      In ED,  VS  T 98, , /93, RR 20, Sat 94% on 2L NC  EKG- Sinus Tach 100 with fusion complexes, no ST changes  ABG - ( 21 Dec 2018 19:46 )  pH, Arterial: 7.30  pH, Blood: x     /  pCO2: 64    /  pO2: 190   / HCO3: 27    / Base Excess: 4.0   /  SaO2: 100     ABG revealed Acute on Chronic respiratory acidosis and pt was placed on BPAP. Then changed to CPAP 5 TV >400 by ICU PA  Labs: WBC shows Eos% 10 , Procal 0.05, CO2 33, Glu 181, Trop neg x 1, UA protein and hyalin casts  Flu AB aand RSV neg  CXR - hyper inflation, No infiltrate , similar to 2018  Received albuterol, solumedrol 40 IV, lasix 40 IV , Azithromycin, sublingual Nitroglycerin.    He was placed on BiPap then CPAP after ICU consulted. (21 Dec 2018 23:39)      ----  INTERVAL HPI/OVERNIGHT EVENTS: Pt seen and evaluated at the bedside. No acute overnight events occurred. Pt is ambulating to the restroom comfortably today without any acute complaints. SOB has improved but he still feels "a little congested" but he thinks there has been a significant improvement.     ----  PAST MEDICAL & SURGICAL HISTORY:  PVD (peripheral vascular disease)  Hypertension  COPD (chronic obstructive pulmonary disease)  Osteomyelitis  Dyslipidemia  CAD (Coronary Artery Disease)  Diabetes Mellitus, Type II  CABG (Coronary Artery Bypass Graft)      FAMILY HISTORY:  Family history of diabetes mellitus (Sibling)      ----  MEDICATIONS  (STANDING):  ALBUTerol    0.083% 2.5 milliGRAM(s) Nebulizer every 4 hours  aspirin enteric coated 81 milliGRAM(s) Oral daily  atorvastatin 40 milliGRAM(s) Oral at bedtime  azithromycin  IVPB 500 milliGRAM(s) IV Intermittent every 24 hours  azithromycin  IVPB      buDESOnide 160 MICROgram(s)/formoterol 4.5 MICROgram(s) Inhaler 2 Puff(s) Inhalation two times a day  clopidogrel Tablet 75 milliGRAM(s) Oral daily  dextrose 5%. 1000 milliLiter(s) (50 mL/Hr) IV Continuous <Continuous>  dextrose 50% Injectable 12.5 Gram(s) IV Push once  dextrose 50% Injectable 25 Gram(s) IV Push once  dextrose 50% Injectable 25 Gram(s) IV Push once  enoxaparin Injectable 40 milliGRAM(s) SubCutaneous daily  insulin lispro (HumaLOG) corrective regimen sliding scale   SubCutaneous three times a day before meals  losartan 100 milliGRAM(s) Oral daily  methylPREDNISolone sodium succinate Injectable 40 milliGRAM(s) IV Push every 12 hours  metoprolol tartrate 25 milliGRAM(s) Oral every 8 hours  pantoprazole    Tablet 40 milliGRAM(s) Oral before breakfast  tamsulosin 0.4 milliGRAM(s) Oral at bedtime  tiotropium 18 MICROgram(s) Capsule 1 Capsule(s) Inhalation daily    MEDICATIONS  (PRN):  dextrose 40% Gel 15 Gram(s) Oral once PRN Blood Glucose LESS THAN 70 milliGRAM(s)/deciliter  glucagon  Injectable 1 milliGRAM(s) IntraMuscular once PRN Glucose LESS THAN 70 milligrams/deciliter  hydrALAZINE 10 milliGRAM(s) Oral every 8 hours PRN Systolic blood pressure > 170      ----  REVIEW OF SYSTEMS:  CONSTITUTIONAL: denies fever, chills, admits sleeping well  HEENT: denies blurred vision, sore throat  SKIN: denies new lesions, rash  CARDIOVASCULAR: denies chest pain, chest pressure, palpitations  RESPIRATORY: as per HPI  GASTROINTESTINAL: denies nausea, vomiting, diarrhea, abdominal pain  GENITOURINARY: denies dysuria, discharge  NEUROLOGICAL: denies numbness, headache, focal weakness  MUSCULOSKELETAL: denies new joint pain, muscle aches  HEMATOLOGIC: denies gross bleeding, bruising  LYMPHATICS: denies enlarged lymph nodes, extremity swelling  ENDOCRINOLOGIC: denies sweating, cold or heat intolerance    ----  PHYSICAL EXAM:  GENERAL: patient appears comfortable, tolerating nasal cannula, no acute distress, conversational, speaking in full sentences  EYES: sclera clear, no exudates  ENMT: oropharynx clear without erythema, no exudates, moist mucous membranes  NECK: supple, soft, no thyromegaly noted  LUNGS: reduced air entry bilaterally, no wheezing today but there is trace bibasilar rhonchi noted, no rales in the bases, improving exam  HEART: soft S1/S2, regular rate and rhythm, distant heart sounds, no lower extremity edema  GASTROINTESTINAL: abdomen is soft, nontender, nondistended, normoactive bowel sounds, no palpable masses  INTEGUMENT: good skin turgor, no lesions noted  MUSCULOSKELETAL: no clubbing or cyanosis, no obvious deformity  NEUROLOGIC: awake, alert, oriented x3, good muscle tone in 4 extremities, no obvious sensory deficits  HEME/LYMPH: no palpable supraclavicular nodules, no obvious ecchymosis or petechiae     T(C): 36.8 (18 @ 13:25), Max: 36.9 (18 @ 16:53)  HR: 86 (18 @ 13:25) (71 - 111)  BP: 130/74 (18 @ 13:25) (114/64 - 135/77)  RR: 16 (18 @ 13:25) (16 - 18)  SpO2: 94% (18 @ 13:25) (93% - 100%)  Wt(kg): --    ----  I&O's Summary    22 Dec 2018 07:01  -  23 Dec 2018 07:00  --------------------------------------------------------  IN: 240 mL / OUT: 700 mL / NET: -460 mL        LABS:                        12.2   12.20 )-----------( 304      ( 23 Dec 2018 07:32 )             40.3         143  |  102  |  21  ----------------------------<  148<H>  4.4   |  37<H>  |  1.20    Ca    9.2      23 Dec 2018 07:32  Phos  2.9       Mg     1.9         TPro  6.8  /  Alb  3.6  /  TBili  0.4  /  DBili  x   /  AST  14<L>  /  ALT  28  /  AlkPhos  86  1222    PT/INR - ( 22 Dec 2018 05:52 )   PT: 13.0 sec;   INR: 1.14 ratio         PTT - ( 21 Dec 2018 19:18 )  PTT:32.3 sec  Urinalysis Basic - ( 21 Dec 2018 20:46 )    Color: Yellow / Appearance: Clear / S.010 / pH: x  Gluc: x / Ketone: Negative  / Bili: Negative / Urobili: Negative   Blood: x / Protein: 25 mg/dL / Nitrite: Negative   Leuk Esterase: Negative / RBC: Negative /HPF / WBC 0-2   Sq Epi: x / Non Sq Epi: Occasional / Bacteria: Negative      CAPILLARY BLOOD GLUCOSE      POCT Blood Glucose.: 269 mg/dL (23 Dec 2018 11:39)  POCT Blood Glucose.: 148 mg/dL (23 Dec 2018 07:52)  POCT Blood Glucose.: 258 mg/dL (22 Dec 2018 21:49)  POCT Blood Glucose.: 243 mg/dL (22 Dec 2018 16:42)    ABG - ( 22 Dec 2018 06:11 )  pH, Arterial: 7.35  pH, Blood: x     /  pCO2: 59    /  pO2: 84    / HCO3: 29    / Base Excess: 6.2   /  SaO2: 96                 @ 10:59   No growth to date.  --  --   @ 23:19   No growth to date.  --  --   @ 22:48   <10,000 CFU/ml Normal Urogenital shayy present  --  --            ----  Personally reviewed:  Vital sign trends: [ x ] yes    [  ] no     [  ] n/a  Laboratory results: [ x ] yes    [  ] no     [  ] n/a  Radiology results: [ x ] yes    [  ] no     [  ] n/a  Culture results: [ x ] yes    [  ] no     [  ] n/a  Consultant recommendations: [ x ] yes    [  ] no     [  ] n/a

## 2018-12-23 NOTE — PROGRESS NOTE ADULT - NSHPATTENDINGPLANDISCUSS_GEN_ALL_CORE
pt, pulm attending, RN - re: tx plan, dispo plan
pt, ED RN - re: tx plan, dispo plan - all in agreement

## 2018-12-23 NOTE — DIETITIAN INITIAL EVALUATION ADULT. - NS AS NUTRI INTERV ED CONTENT
Wife provided with written and verbal nutrition education on consistent carbohydrate diet. Topics discussed include: optimal sources of carbohydrates, complex vs. simple carbohydrates, inclusion of whole grains and fiber, mixed meals (pairing protein and carbohydrate for optimal blood glucose response), and timing of meals/snacks. Wife verbalized understanding of diet and will follow up regarding teach back ability. Wife provided with written and verbal nutrition education on heart healthy, consistent carbohydrate diet. Topics discussed include: lowering sodium intake, dietary sources of sodium, choosing heart healthy low fat meat/dairy optimal sources of carbohydrates, complex vs. simple carbohydrates, inclusion of whole grains and fiber, mixed meals (pairing protein and carbohydrate for optimal blood glucose response), and timing of meals/snacks. Wife verbalized understanding of diet and will follow up regarding teach back ability.

## 2018-12-23 NOTE — PROGRESS NOTE ADULT - PROBLEM SELECTOR PLAN 2
- BP's have been labile but well controlled over last 8hr, elevated by steroids/hypoxia/stress  - Hold home dose Diovan HCT   - Continue Losartan 100 mg daily  - will add hydralazine prn elevated SBP's
- BP well controlled last 24hrs  - Hold home dose Diovan HCT   - Continue Losartan 100 mg daily  - ordered hydralazine prn elevated SBP's but hasn't received any

## 2018-12-23 NOTE — PROGRESS NOTE ADULT - PROBLEM SELECTOR PLAN 6
IMPROVE VTE Individual Risk Assessment  RISK                                                                Points  [  ] Previous VTE                                                  3  [  ] Thrombophilia                                               2  [  ] Lower limb paralysis                                      2        (unable to hold up >15 seconds)    [  ] Current Cancer                                              2         (within 6 months)  [  ] Immobilization > 24 hrs                                1  [  ] ICU/CCU stay > 24 hours                              1  [ X ] Age > 60                                                      1  IMPROVE VTE Score ____1_____    Lovenox 40 mg subcutaneous daily (Padua score 5 infection, age, reduced mobility)
IMPROVE VTE Individual Risk Assessment  RISK                                                                Points  [  ] Previous VTE                                                  3  [  ] Thrombophilia                                               2  [  ] Lower limb paralysis                                      2        (unable to hold up >15 seconds)    [  ] Current Cancer                                              2         (within 6 months)  [  ] Immobilization > 24 hrs                                1  [  ] ICU/CCU stay > 24 hours                              1  [ X ] Age > 60                                                      1  IMPROVE VTE Score ____1_____    Lovenox 40 mg subcutaneous daily (Padua score 5 infection, age, reduced mobility)

## 2018-12-23 NOTE — DIETITIAN INITIAL EVALUATION ADULT. - NS AS NUTRI INTERV MEALS SNACK
Continue DASH/TLC, Consistent Carbohydrate (with snack) diet as it remains appropriate at this time. F/u HgbA1c. Encourage po intake of nutrient dense, carbohydrate controlled meals/snacks.

## 2018-12-23 NOTE — DISCHARGE NOTE ADULT - SECONDARY DIAGNOSIS.
CAD (Coronary Artery Disease) Diabetes Mellitus, Type II Dyslipidemia Essential hypertension Benign prostatic hyperplasia with lower urinary tract symptoms, symptom details unspecified

## 2018-12-23 NOTE — DIETITIAN INITIAL EVALUATION ADULT. - PROBLEM SELECTOR PLAN 1
Acute respiratory failure, acidosis with hypercapnia  Likely viral induced? possible virus not covered by the current RVPanel  -Pulm Dr Dejesus consulted  -on IV corticoteroids  - Continue CPAP 5 TV > 400  - Continue Albuterol Neb 2.5 q4 hours  - Continue Budesonide/ formoterol BID  - Repeat ABG in AM   - F/u AM Labs: CBC, BMP, Blood cultures

## 2018-12-23 NOTE — PROGRESS NOTE ADULT - PROBLEM SELECTOR PLAN 3
- Stable  - Hold home Metformin, in the setting of acidemia  - hold glipizide  - Leyda, JOSETTE, Carb Count Diet  - f/u HgA1c, it was only 6.2 back in 10/2018 recently
- Stable  - Hold home Metformin, in the setting of acidemia  - hold glipizide  - Leyda, JOSETTE, Carb Count Diet  - f/u HgA1c, it was only 6.2 back in 10/2018 recently

## 2018-12-23 NOTE — DISCHARGE NOTE ADULT - MEDICATION SUMMARY - MEDICATIONS TO TAKE
I will START or STAY ON the medications listed below when I get home from the hospital:    Medrol Dosepak 4 mg oral tablet  -- Please use as directed on the package   -- Indication: For COPD exacerbation    aspirin 81 mg oral tablet  -- 1 tab(s) by mouth once a day  -- Indication: For CAD (Coronary Artery Disease)    tamsulosin 0.4 mg oral capsule  -- 1 cap(s) by mouth once a day (at bedtime)  -- Indication: For Bph    NovoLOG FlexPen 100 units/mL injectable solution  -- sliding scale  -- Indication: For Diabetes Mellitus, Type II    glipiZIDE 2.5 mg oral tablet, extended release  -- 1 tab(s) by mouth once a day  -- Indication: For Diabetes Mellitus, Type II    metFORMIN 1000 mg oral tablet  -- 1 tab(s) by mouth 2 times a day  -- Indication: For Diabetes Mellitus, Type II    atorvastatin 40 mg oral tablet  -- 1 tab(s) by mouth once a day (at bedtime)  -- Indication: For HLD    valsartan-hydrochlorothiazide 160mg-12.5mg oral tablet  -- 1 tab(s) by mouth once a day  -- Indication: For HTN    clopidogrel 75 mg oral tablet  -- 1 tab(s) by mouth once a day  -- Indication: For CAD (Coronary Artery Disease)    metoprolol tartrate 25 mg oral tablet  -- 1 tab(s) by mouth every 8 hours  -- Indication: For Htn    Symbicort 160 mcg-4.5 mcg/inh inhalation aerosol  -- 2 puff(s) inhaled 2 times a day  -- Indication: For COPD exacerbation    Incruse Ellipta 62.5 mcg/inh inhalation powder  -- home dose and frequency  -- Indication: For COPD exacerbation    docusate sodium 100 mg oral capsule  -- 1 cap(s) by mouth 3 times a day  -- Indication: For as needed for constipation     senna oral tablet  -- 1 tab(s) by mouth once a day  -- Indication: For as needed for constipation     pantoprazole 40 mg oral delayed release tablet  -- 1 tab(s) by mouth once a day (before a meal) only while taking steroids  -- Indication: For Gi PPX    Multiple Vitamins oral tablet  -- 1 tab(s) by mouth once a day  -- Indication: For Supplements     Vitamin C  -- 1 tab(s) by mouth once a day  -- Indication: For supplemnt

## 2018-12-23 NOTE — PROGRESS NOTE ADULT - ATTENDING COMMENTS
The tx plan was discussed with the patient in detail. The patient's questions and concerns were addressed to the best of my ability. The patient is in agreement with the plan detailed above. The patient demonstrated adequate understanding of the plan and the counseling which I have provided.    D/c planning for 24-48hrs w/ steroid taper if clinically indicated. Still tight. Spoke w/ pulm, agree that pt needs at least 24hrs. Pt eager for d/c home.
The tx plan was discussed with the patient in detail. The patient's questions and concerns were addressed to the best of my ability. The patient is in agreement with the plan detailed above. The patient demonstrated adequate understanding of the plan and the counseling which I have provided.

## 2018-12-23 NOTE — PROGRESS NOTE ADULT - SUBJECTIVE AND OBJECTIVE BOX
Patient was examined Chart reviewed Detailed note will be inserted below after patient's latest data is processed Meanwhile continue management as per assessment recommendations in previous pulmonary note

## 2018-12-23 NOTE — DISCHARGE NOTE ADULT - PATIENT PORTAL LINK FT
You can access the [facility], offered by [facility], by registering with the following website: [email or web address]

## 2018-12-23 NOTE — DIETITIAN INITIAL EVALUATION ADULT. - ENERGY NEEDS
Wt: 215 pounds, Ht: 70 inches, BMI: 30.8 kg/m2 IBW: 166 pounds  +/-10%, %IBW: 129%  No edema; pt with unstageable pressure injury (healed) coccyx.

## 2018-12-23 NOTE — PROGRESS NOTE ADULT - ASSESSMENT
79 y/o M PMhx HTN, PVD, DM, Asthma, COPD ( on 2L home O2) c/b Chronic Hypoxemic Respiratory Failure, CABG, HLD, recent admission to [facility] Oct 2018 for hypercapnic respiratory failure c/b  cardiac arrest,  now presents to ED  w/ SOB and increased work of breathing x 1 day. Admit for Acute on Chronic Respiratory failure with hypercapnia.
77 y/o M PMhx HTN, PVD, DM, Asthma, COPD ( on 2L home O2) c/b Chronic Hypoxemic Respiratory Failure, CABG, HLD, recent admission to [facility] Oct 2018 for hypercapnic respiratory failure c/b  cardiac arrest,  now presents to ED  w/ SOB and increased work of breathing x 1 day. Admit for Acute on Chronic Respiratory failure with hypercapnia.

## 2018-12-23 NOTE — DIETITIAN INITIAL EVALUATION ADULT. - ORAL INTAKE PTA
Per wife, pt with good appetite/intake PTA. She prepares most meals for him. Typical intake: breakfast - oatmeal with berries/bananas, or shredded wheat with fruit, lunch - tuna sandwich on whole wheat bread, soup, dinner - protein (fish, chicken, beef), salad, vegetable. Pt takes CoQ10, Vitamin C, Vitamin B complex, and fish oil PTA.

## 2018-12-23 NOTE — DIETITIAN INITIAL EVALUATION ADULT. - PROBLEM SELECTOR PLAN 5
IMPROVE VTE Individual Risk Assessment  RISK                                                                Points  [  ] Previous VTE                                                  3  [  ] Thrombophilia                                               2  [  ] Lower limb paralysis                                      2        (unable to hold up >15 seconds)    [  ] Current Cancer                                              2         (within 6 months)  [  ] Immobilization > 24 hrs                                1  [  ] ICU/CCU stay > 24 hours                              1  [ X ] Age > 60                                                      1  IMPROVE VTE Score ____1_____    Lovenox 40 mg subcutaneous daily (Padua score 5 infection, age, reduced mobility)

## 2018-12-23 NOTE — DISCHARGE NOTE ADULT - CARE PROVIDER_API CALL
Pallavi Aguiar), Cardiovascular Disease; Internal Medicine  [address] [facility]  [address]  [place] [address]  Phone: [phone]  Fax: [phone]    Arun Dejesus), Critical Care Medicine; Internal Medicine; Pulmonary Disease  [address] [place] [address]  Phone: [phone]  Fax: [phone]

## 2018-12-23 NOTE — PROGRESS NOTE ADULT - PROBLEM SELECTOR PROBLEM 5
Benign prostatic hyperplasia with lower urinary tract symptoms, symptom details unspecified
Benign prostatic hyperplasia with lower urinary tract symptoms, symptom details unspecified

## 2018-12-23 NOTE — DISCHARGE NOTE ADULT - PLAN OF CARE
Improve symptoms - Please follow up with Dr. Dejesus in 1-2wks in his office  - Please continue steroid taper as detailed - Please follow up with Dr. Dejesus in 1-2wks in his office  - Please continue steroid taper as detailed  -Continue home inhalers Continue home meds  F/u with your cardiologist Continue home meds  F/u with your PCP  HbA1C 6.2 Continue home med  f/u with your cardiologist Continue home meds  F/u with your PCP Continue Flomax  F/u with your doctor

## 2018-12-24 VITALS
TEMPERATURE: 98 F | RESPIRATION RATE: 18 BRPM | SYSTOLIC BLOOD PRESSURE: 128 MMHG | HEART RATE: 87 BPM | DIASTOLIC BLOOD PRESSURE: 81 MMHG | OXYGEN SATURATION: 97 %

## 2018-12-24 LAB
ANION GAP SERPL CALC-SCNC: 4 MMOL/L — LOW (ref 5–17)
BUN SERPL-MCNC: 22 MG/DL — SIGNIFICANT CHANGE UP (ref 7–23)
CALCIUM SERPL-MCNC: 8.8 MG/DL — SIGNIFICANT CHANGE UP (ref 8.5–10.1)
CHLORIDE SERPL-SCNC: 102 MMOL/L — SIGNIFICANT CHANGE UP (ref 96–108)
CO2 SERPL-SCNC: 36 MMOL/L — HIGH (ref 22–31)
CREAT SERPL-MCNC: 1.2 MG/DL — SIGNIFICANT CHANGE UP (ref 0.5–1.3)
GLUCOSE SERPL-MCNC: 172 MG/DL — HIGH (ref 70–99)
HCT VFR BLD CALC: 40.1 % — SIGNIFICANT CHANGE UP (ref 39–50)
HGB BLD-MCNC: 12.2 G/DL — LOW (ref 13–17)
MAGNESIUM SERPL-MCNC: 2 MG/DL — SIGNIFICANT CHANGE UP (ref 1.6–2.6)
MCHC RBC-ENTMCNC: 28.2 PG — SIGNIFICANT CHANGE UP (ref 27–34)
MCHC RBC-ENTMCNC: 30.4 GM/DL — LOW (ref 32–36)
MCV RBC AUTO: 92.8 FL — SIGNIFICANT CHANGE UP (ref 80–100)
NRBC # BLD: 0 /100 WBCS — SIGNIFICANT CHANGE UP (ref 0–0)
PLATELET # BLD AUTO: 300 K/UL — SIGNIFICANT CHANGE UP (ref 150–400)
POTASSIUM SERPL-MCNC: 4.6 MMOL/L — SIGNIFICANT CHANGE UP (ref 3.5–5.3)
POTASSIUM SERPL-SCNC: 4.6 MMOL/L — SIGNIFICANT CHANGE UP (ref 3.5–5.3)
RBC # BLD: 4.32 M/UL — SIGNIFICANT CHANGE UP (ref 4.2–5.8)
RBC # FLD: 13.3 % — SIGNIFICANT CHANGE UP (ref 10.3–14.5)
SODIUM SERPL-SCNC: 142 MMOL/L — SIGNIFICANT CHANGE UP (ref 135–145)
WBC # BLD: 9.49 K/UL — SIGNIFICANT CHANGE UP (ref 3.8–10.5)
WBC # FLD AUTO: 9.49 K/UL — SIGNIFICANT CHANGE UP (ref 3.8–10.5)

## 2018-12-24 PROCEDURE — 83880 ASSAY OF NATRIURETIC PEPTIDE: CPT

## 2018-12-24 PROCEDURE — 85610 PROTHROMBIN TIME: CPT

## 2018-12-24 PROCEDURE — 94660 CPAP INITIATION&MGMT: CPT

## 2018-12-24 PROCEDURE — 86901 BLOOD TYPING SEROLOGIC RH(D): CPT

## 2018-12-24 PROCEDURE — 93005 ELECTROCARDIOGRAM TRACING: CPT

## 2018-12-24 PROCEDURE — 84100 ASSAY OF PHOSPHORUS: CPT

## 2018-12-24 PROCEDURE — 86900 BLOOD TYPING SEROLOGIC ABO: CPT

## 2018-12-24 PROCEDURE — 82553 CREATINE MB FRACTION: CPT

## 2018-12-24 PROCEDURE — 84145 PROCALCITONIN (PCT): CPT

## 2018-12-24 PROCEDURE — 84484 ASSAY OF TROPONIN QUANT: CPT

## 2018-12-24 PROCEDURE — 82962 GLUCOSE BLOOD TEST: CPT

## 2018-12-24 PROCEDURE — 83735 ASSAY OF MAGNESIUM: CPT

## 2018-12-24 PROCEDURE — 82803 BLOOD GASES ANY COMBINATION: CPT

## 2018-12-24 PROCEDURE — 83605 ASSAY OF LACTIC ACID: CPT

## 2018-12-24 PROCEDURE — 71045 X-RAY EXAM CHEST 1 VIEW: CPT

## 2018-12-24 PROCEDURE — 96365 THER/PROPH/DIAG IV INF INIT: CPT

## 2018-12-24 PROCEDURE — 81001 URINALYSIS AUTO W/SCOPE: CPT

## 2018-12-24 PROCEDURE — 87631 RESP VIRUS 3-5 TARGETS: CPT

## 2018-12-24 PROCEDURE — 94640 AIRWAY INHALATION TREATMENT: CPT

## 2018-12-24 PROCEDURE — 96375 TX/PRO/DX INJ NEW DRUG ADDON: CPT

## 2018-12-24 PROCEDURE — 99291 CRITICAL CARE FIRST HOUR: CPT | Mod: 25

## 2018-12-24 PROCEDURE — 94760 N-INVAS EAR/PLS OXIMETRY 1: CPT

## 2018-12-24 PROCEDURE — 93970 EXTREMITY STUDY: CPT

## 2018-12-24 PROCEDURE — 87086 URINE CULTURE/COLONY COUNT: CPT

## 2018-12-24 PROCEDURE — 80048 BASIC METABOLIC PNL TOTAL CA: CPT

## 2018-12-24 PROCEDURE — 87040 BLOOD CULTURE FOR BACTERIA: CPT

## 2018-12-24 PROCEDURE — 80053 COMPREHEN METABOLIC PANEL: CPT

## 2018-12-24 PROCEDURE — 36415 COLL VENOUS BLD VENIPUNCTURE: CPT

## 2018-12-24 PROCEDURE — 85027 COMPLETE CBC AUTOMATED: CPT

## 2018-12-24 PROCEDURE — 86850 RBC ANTIBODY SCREEN: CPT

## 2018-12-24 PROCEDURE — 85730 THROMBOPLASTIN TIME PARTIAL: CPT

## 2018-12-24 PROCEDURE — 99239 HOSP IP/OBS DSCHRG MGMT >30: CPT

## 2018-12-24 RX ORDER — UMECLIDINIUM 62.5 UG/1
0 AEROSOL, POWDER ORAL
Qty: 0 | Refills: 0 | COMMUNITY

## 2018-12-24 RX ADMIN — CLOPIDOGREL BISULFATE 75 MILLIGRAM(S): 75 TABLET, FILM COATED ORAL at 11:55

## 2018-12-24 RX ADMIN — ALBUTEROL 2.5 MILLIGRAM(S): 90 AEROSOL, METERED ORAL at 11:19

## 2018-12-24 RX ADMIN — Medication 40 MILLIGRAM(S): at 05:59

## 2018-12-24 RX ADMIN — ENOXAPARIN SODIUM 40 MILLIGRAM(S): 100 INJECTION SUBCUTANEOUS at 11:55

## 2018-12-24 RX ADMIN — Medication 25 MILLIGRAM(S): at 05:59

## 2018-12-24 RX ADMIN — BUDESONIDE AND FORMOTEROL FUMARATE DIHYDRATE 2 PUFF(S): 160; 4.5 AEROSOL RESPIRATORY (INHALATION) at 08:30

## 2018-12-24 RX ADMIN — ALBUTEROL 2.5 MILLIGRAM(S): 90 AEROSOL, METERED ORAL at 07:36

## 2018-12-24 RX ADMIN — ALBUTEROL 2.5 MILLIGRAM(S): 90 AEROSOL, METERED ORAL at 03:12

## 2018-12-24 RX ADMIN — Medication 3: at 11:55

## 2018-12-24 RX ADMIN — LOSARTAN POTASSIUM 100 MILLIGRAM(S): 100 TABLET, FILM COATED ORAL at 05:59

## 2018-12-24 RX ADMIN — PANTOPRAZOLE SODIUM 40 MILLIGRAM(S): 20 TABLET, DELAYED RELEASE ORAL at 05:59

## 2018-12-24 RX ADMIN — Medication 81 MILLIGRAM(S): at 11:55

## 2018-12-24 RX ADMIN — Medication 1: at 08:14

## 2018-12-24 RX ADMIN — TIOTROPIUM BROMIDE 1 CAPSULE(S): 18 CAPSULE ORAL; RESPIRATORY (INHALATION) at 05:58

## 2018-12-24 NOTE — PROGRESS NOTE ADULT - SUBJECTIVE AND OBJECTIVE BOX
Patient was examined Chart reviewed Detailed note will be inserted below after latest data is processed Meanwhile continue management in accordance with my previous recommendations Patient was examined Chart reviewed Detailed note will be inserted below after latest data is processed Meanwhile continue management in accordance with my previous recommendations         COMMODORE TURNER [facility] P    ALLERGY Shellfish  CONTACT Sp Amira C    REASON FOR VISIT Subsequent pulm fu   Initial evaluation/Pulmonary consultation requested  12/21/2018 from Dr Dejesus   Patient examined chart reviewed  HOSPITAL ADMISSION [facility] Dr Gregory Reyes   PATIENT CAME  FROM (if information available)      VITALS/LABS                           12/24/2018 afeb 87 128/81 18 97%   12/24/2018 W 9.4 Hb 12.2 Plt 300 Na 142 K 4.5 CO2 36 Cr 1.2     REVIEW OF SYMPTOMS    Able to give ROS  Yes     RELIABLE No   CONSTITUTIONAL Weakness Yes  Chills No Vision changes No  ENDOCRINE No unexplained hair loss No heat or cold intolerance    ALLERGY No hives  Sore throat No   RESP Coughing blood no  Shortness of breath YES   NEURO No Headache  Confusion Pain neck No   CARDIAC No Chest pain No Palpitations   GI No Pain abdomen NO   Vomiting NO     PHYSICAL EXAM    HEENT Unremarkable PERRLA atraumatic   RESP Fair air entry EXP prolonged    Harsh breath sound Resp distres mild   CARDIAC S1 S2 No S3     NO JVD    ABDOMEN SOFT BS PRESENT NOT DISTENDED No hepatosplenomegaly PEDAL EDEMA present No calf tenderness  NO rash   GENERAL Not TOXIC looking    GLOBAL ISSUE/BEST PRACTICE:      PROBLEM: HOB elevation:   y            PROBLEM: Stress ulcer proph:                      PROBLEM: VTE prophylaxis:      Chlorhexidine .12%    PROBLEM: Glycemic control:    na  PROBLEM: Nutrition:  cons carb (12/22)     PROBLEM: Advanced directive: na     PROBLEM: Allergies:  shellfish  PROCEDURE 10/29/2018 EXtubation   PEROCEDURE 10/26/2018 ET intubation    PROBLEM SPECIFIC PATIENT DATA PATIENT COMMODORE TURNER 12/21/2018  ADMISSION [facility] DR GREGORY REYES  79 y/o male pmhx HTN, PVD, DM, Asthma, COPD on 2L home O2, hx CABGx3, HLD HO CAC ttt 10/26/2018 resp failure intubation presented to ED 12/21/2018  w/ SOB and increased work of breathing. Pateint was neg for flu and rsv (12/21/2018) and CXR showed just hyperinflation ABG revealed ac on chr resp acidosis and pt was placed on BPAP admitted [facility] 12/21/2018 and Pulm consulted   COPD ex albuterol (12/21) symbicort (12/21) spiriva (12/21) solumed 40.2 (12/21)   RO RESP TRACT INFECTION 12/22/2018 prct n 12/21/2018 Flu AB aand RSV n 12/21/2018 W 7.9  12/21/2018 CXR No infiltr Azithro (12/21)   DM Sl scale (12/21)   RESP  12/22/2018 5a .3/10/6 735/59/84 12/21/2018 bpap 12/6/.5 730/64/190   RO MI 12/21-12/22/2018 Tr 1 n.2     ASSESSMENT RECOMMENDATIONSPATIENT COMMODORE YUE 12/21/2018  ADMISSION [facility] P DR GREGORY REYES  COPD ex BD Stroid DC planning on 12/24 on pred 40 Taper over 12 d  Improved  RO RESP TRACT INFECTION  Azithro (12/21) for antiinflammatory and immunomodulat No evidence of infection needing more bs abio   DM Sl scale (12/21)   RESP  12/21/2018 bpap 12/6/.5 730/64/190 Monitor abg 12/22/2018 Improved   RO MI 12/21-12/22/2018 Tr 1 n.2 MI roed     TIME SPENT Over 25 minutes aggregate care time spent on encounter; activities included   direct patient care, counseling and/or coordinating care reviewing notes, lab data/ imaging , discussion with multidisciplinary team/ patient  /family. Risks, benefits, alternatives  discussed in detail.

## 2018-12-24 NOTE — PROGRESS NOTE ADULT - REASON FOR ADMISSION
Acute on Chronic Respiratory Failure

## 2018-12-27 LAB
CULTURE RESULTS: SIGNIFICANT CHANGE UP
SPECIMEN SOURCE: SIGNIFICANT CHANGE UP

## 2019-01-06 ENCOUNTER — INPATIENT (INPATIENT)
Facility: HOSPITAL | Age: 79
LOS: 2 days | Discharge: HOME | DRG: 189 | End: 2019-01-09
Attending: INTERNAL MEDICINE | Admitting: STUDENT IN AN ORGANIZED HEALTH CARE EDUCATION/TRAINING PROGRAM
Payer: COMMERCIAL

## 2019-01-06 VITALS
HEART RATE: 135 BPM | TEMPERATURE: 98 F | SYSTOLIC BLOOD PRESSURE: 138 MMHG | HEIGHT: 71 IN | DIASTOLIC BLOOD PRESSURE: 70 MMHG | OXYGEN SATURATION: 50 % | WEIGHT: 220.02 LBS

## 2019-01-06 DIAGNOSIS — I25.10 ATHEROSCLEROTIC HEART DISEASE OF NATIVE CORONARY ARTERY WITHOUT ANGINA PECTORIS: ICD-10-CM

## 2019-01-06 DIAGNOSIS — J44.1 CHRONIC OBSTRUCTIVE PULMONARY DISEASE WITH (ACUTE) EXACERBATION: ICD-10-CM

## 2019-01-06 DIAGNOSIS — E78.5 HYPERLIPIDEMIA, UNSPECIFIED: ICD-10-CM

## 2019-01-06 DIAGNOSIS — J96.21 ACUTE AND CHRONIC RESPIRATORY FAILURE WITH HYPOXIA: ICD-10-CM

## 2019-01-06 DIAGNOSIS — I10 ESSENTIAL (PRIMARY) HYPERTENSION: ICD-10-CM

## 2019-01-06 DIAGNOSIS — E11.9 TYPE 2 DIABETES MELLITUS WITHOUT COMPLICATIONS: ICD-10-CM

## 2019-01-06 DIAGNOSIS — H92.02 OTALGIA, LEFT EAR: ICD-10-CM

## 2019-01-06 DIAGNOSIS — Z29.9 ENCOUNTER FOR PROPHYLACTIC MEASURES, UNSPECIFIED: ICD-10-CM

## 2019-01-06 DIAGNOSIS — J44.9 CHRONIC OBSTRUCTIVE PULMONARY DISEASE, UNSPECIFIED: ICD-10-CM

## 2019-01-06 LAB
ALBUMIN SERPL ELPH-MCNC: 3.6 G/DL — SIGNIFICANT CHANGE UP (ref 3.3–5)
ALP SERPL-CCNC: 100 U/L — SIGNIFICANT CHANGE UP (ref 40–120)
ALT FLD-CCNC: 41 U/L — SIGNIFICANT CHANGE UP (ref 12–78)
ANION GAP SERPL CALC-SCNC: 6 MMOL/L — SIGNIFICANT CHANGE UP (ref 5–17)
APTT BLD: 31.3 SEC — SIGNIFICANT CHANGE UP (ref 27.5–36.3)
AST SERPL-CCNC: 21 U/L — SIGNIFICANT CHANGE UP (ref 15–37)
BASE EXCESS BLDA CALC-SCNC: 2.3 MMOL/L — HIGH (ref -2–2)
BASE EXCESS BLDA CALC-SCNC: 2.4 MMOL/L — HIGH (ref -2–2)
BASE EXCESS BLDA CALC-SCNC: 4.9 MMOL/L — HIGH (ref -2–2)
BASOPHILS # BLD AUTO: 0.02 K/UL — SIGNIFICANT CHANGE UP (ref 0–0.2)
BASOPHILS NFR BLD AUTO: 0.2 % — SIGNIFICANT CHANGE UP (ref 0–2)
BILIRUB SERPL-MCNC: 0.4 MG/DL — SIGNIFICANT CHANGE UP (ref 0.2–1.2)
BLOOD GAS COMMENTS ARTERIAL: SIGNIFICANT CHANGE UP
BUN SERPL-MCNC: 15 MG/DL — SIGNIFICANT CHANGE UP (ref 7–23)
CALCIUM SERPL-MCNC: 9 MG/DL — SIGNIFICANT CHANGE UP (ref 8.5–10.1)
CHLORIDE SERPL-SCNC: 99 MMOL/L — SIGNIFICANT CHANGE UP (ref 96–108)
CK MB CFR SERPL CALC: 5 NG/ML — HIGH (ref 0–3.6)
CK SERPL-CCNC: 69 U/L — SIGNIFICANT CHANGE UP (ref 26–308)
CO2 SERPL-SCNC: 33 MMOL/L — HIGH (ref 22–31)
CREAT SERPL-MCNC: 1.2 MG/DL — SIGNIFICANT CHANGE UP (ref 0.5–1.3)
EOSINOPHIL # BLD AUTO: 0.42 K/UL — SIGNIFICANT CHANGE UP (ref 0–0.5)
EOSINOPHIL NFR BLD AUTO: 3.4 % — SIGNIFICANT CHANGE UP (ref 0–6)
FLU A RESULT: SIGNIFICANT CHANGE UP
FLU A RESULT: SIGNIFICANT CHANGE UP
FLUAV AG NPH QL: SIGNIFICANT CHANGE UP
FLUBV AG NPH QL: SIGNIFICANT CHANGE UP
GLUCOSE SERPL-MCNC: 308 MG/DL — HIGH (ref 70–99)
HCO3 BLDA-SCNC: 24 MMOL/L — SIGNIFICANT CHANGE UP (ref 23–27)
HCO3 BLDA-SCNC: 25 MMOL/L — SIGNIFICANT CHANGE UP (ref 23–27)
HCO3 BLDA-SCNC: 27 MMOL/L — SIGNIFICANT CHANGE UP (ref 23–27)
HCOV PNL SPEC NAA+PROBE: DETECTED
HCT VFR BLD CALC: 44.4 % — SIGNIFICANT CHANGE UP (ref 39–50)
HGB BLD-MCNC: 13.6 G/DL — SIGNIFICANT CHANGE UP (ref 13–17)
HOROWITZ INDEX BLDA+IHG-RTO: 25 — SIGNIFICANT CHANGE UP
HOROWITZ INDEX BLDA+IHG-RTO: 30 — SIGNIFICANT CHANGE UP
HOROWITZ INDEX BLDA+IHG-RTO: 40 — SIGNIFICANT CHANGE UP
IMM GRANULOCYTES NFR BLD AUTO: 0.6 % — SIGNIFICANT CHANGE UP (ref 0–1.5)
INR BLD: 0.92 RATIO — SIGNIFICANT CHANGE UP (ref 0.88–1.16)
LYMPHOCYTES # BLD AUTO: 1.9 K/UL — SIGNIFICANT CHANGE UP (ref 1–3.3)
LYMPHOCYTES # BLD AUTO: 15.4 % — SIGNIFICANT CHANGE UP (ref 13–44)
MAGNESIUM SERPL-MCNC: 1.7 MG/DL — SIGNIFICANT CHANGE UP (ref 1.6–2.6)
MCHC RBC-ENTMCNC: 28.2 PG — SIGNIFICANT CHANGE UP (ref 27–34)
MCHC RBC-ENTMCNC: 30.6 GM/DL — LOW (ref 32–36)
MCV RBC AUTO: 91.9 FL — SIGNIFICANT CHANGE UP (ref 80–100)
MONOCYTES # BLD AUTO: 1.01 K/UL — HIGH (ref 0–0.9)
MONOCYTES NFR BLD AUTO: 8.2 % — SIGNIFICANT CHANGE UP (ref 2–14)
NEUTROPHILS # BLD AUTO: 8.93 K/UL — HIGH (ref 1.8–7.4)
NEUTROPHILS NFR BLD AUTO: 72.2 % — SIGNIFICANT CHANGE UP (ref 43–77)
NRBC # BLD: 0 /100 WBCS — SIGNIFICANT CHANGE UP (ref 0–0)
NT-PROBNP SERPL-SCNC: 136 PG/ML — SIGNIFICANT CHANGE UP (ref 0–450)
PCO2 BLDA: 67 MMHG — HIGH (ref 32–46)
PCO2 BLDA: 71 MMHG — CRITICAL HIGH (ref 32–46)
PCO2 BLDA: 90 MMHG — CRITICAL HIGH (ref 32–46)
PH BLDA: 7.15 — CRITICAL LOW (ref 7.35–7.45)
PH BLDA: 7.24 — LOW (ref 7.35–7.45)
PH BLDA: 7.29 — LOW (ref 7.35–7.45)
PLATELET # BLD AUTO: 262 K/UL — SIGNIFICANT CHANGE UP (ref 150–400)
PO2 BLDA: 196 MMHG — HIGH (ref 74–108)
PO2 BLDA: 76 MMHG — SIGNIFICANT CHANGE UP (ref 74–108)
PO2 BLDA: 77 MMHG — SIGNIFICANT CHANGE UP (ref 74–108)
POTASSIUM SERPL-MCNC: 5 MMOL/L — SIGNIFICANT CHANGE UP (ref 3.5–5.3)
POTASSIUM SERPL-SCNC: 5 MMOL/L — SIGNIFICANT CHANGE UP (ref 3.5–5.3)
PROT SERPL-MCNC: 7.1 G/DL — SIGNIFICANT CHANGE UP (ref 6–8.3)
PROTHROM AB SERPL-ACNC: 10.4 SEC — SIGNIFICANT CHANGE UP (ref 10–12.9)
RAPID RVP RESULT: DETECTED
RBC # BLD: 4.83 M/UL — SIGNIFICANT CHANGE UP (ref 4.2–5.8)
RBC # FLD: 13.1 % — SIGNIFICANT CHANGE UP (ref 10.3–14.5)
RSV RESULT: SIGNIFICANT CHANGE UP
RSV RNA RESP QL NAA+PROBE: SIGNIFICANT CHANGE UP
SAO2 % BLDA: 93 % — SIGNIFICANT CHANGE UP (ref 92–96)
SAO2 % BLDA: 94 % — SIGNIFICANT CHANGE UP (ref 92–96)
SAO2 % BLDA: 99 % — HIGH (ref 92–96)
SODIUM SERPL-SCNC: 138 MMOL/L — SIGNIFICANT CHANGE UP (ref 135–145)
TROPONIN I SERPL-MCNC: 0.03 NG/ML — SIGNIFICANT CHANGE UP (ref 0.01–0.04)
TROPONIN I SERPL-MCNC: <.015 NG/ML — SIGNIFICANT CHANGE UP (ref 0.01–0.04)
WBC # BLD: 12.35 K/UL — HIGH (ref 3.8–10.5)
WBC # FLD AUTO: 12.35 K/UL — HIGH (ref 3.8–10.5)

## 2019-01-06 PROCEDURE — 99291 CRITICAL CARE FIRST HOUR: CPT

## 2019-01-06 PROCEDURE — 99223 1ST HOSP IP/OBS HIGH 75: CPT | Mod: GC,AI

## 2019-01-06 PROCEDURE — 93010 ELECTROCARDIOGRAM REPORT: CPT

## 2019-01-06 PROCEDURE — 93970 EXTREMITY STUDY: CPT | Mod: 26

## 2019-01-06 PROCEDURE — 71045 X-RAY EXAM CHEST 1 VIEW: CPT | Mod: 26

## 2019-01-06 PROCEDURE — 99223 1ST HOSP IP/OBS HIGH 75: CPT

## 2019-01-06 RX ORDER — INSULIN LISPRO 100/ML
VIAL (ML) SUBCUTANEOUS
Qty: 0 | Refills: 0 | Status: DISCONTINUED | OUTPATIENT
Start: 2019-01-06 | End: 2019-01-09

## 2019-01-06 RX ORDER — IPRATROPIUM/ALBUTEROL SULFATE 18-103MCG
3 AEROSOL WITH ADAPTER (GRAM) INHALATION ONCE
Qty: 0 | Refills: 0 | Status: COMPLETED | OUTPATIENT
Start: 2019-01-06 | End: 2019-01-06

## 2019-01-06 RX ORDER — DEXTROSE 50 % IN WATER 50 %
15 SYRINGE (ML) INTRAVENOUS ONCE
Qty: 0 | Refills: 0 | Status: DISCONTINUED | OUTPATIENT
Start: 2019-01-06 | End: 2019-01-09

## 2019-01-06 RX ORDER — CLOPIDOGREL BISULFATE 75 MG/1
75 TABLET, FILM COATED ORAL DAILY
Qty: 0 | Refills: 0 | Status: DISCONTINUED | OUTPATIENT
Start: 2019-01-06 | End: 2019-01-09

## 2019-01-06 RX ORDER — INSULIN LISPRO 100/ML
VIAL (ML) SUBCUTANEOUS AT BEDTIME
Qty: 0 | Refills: 0 | Status: DISCONTINUED | OUTPATIENT
Start: 2019-01-06 | End: 2019-01-09

## 2019-01-06 RX ORDER — ASPIRIN/CALCIUM CARB/MAGNESIUM 324 MG
81 TABLET ORAL DAILY
Qty: 0 | Refills: 0 | Status: DISCONTINUED | OUTPATIENT
Start: 2019-01-06 | End: 2019-01-09

## 2019-01-06 RX ORDER — SODIUM CHLORIDE 9 MG/ML
1000 INJECTION, SOLUTION INTRAVENOUS
Qty: 0 | Refills: 0 | Status: DISCONTINUED | OUTPATIENT
Start: 2019-01-06 | End: 2019-01-09

## 2019-01-06 RX ORDER — DEXTROSE 50 % IN WATER 50 %
25 SYRINGE (ML) INTRAVENOUS ONCE
Qty: 0 | Refills: 0 | Status: DISCONTINUED | OUTPATIENT
Start: 2019-01-06 | End: 2019-01-09

## 2019-01-06 RX ORDER — METOPROLOL TARTRATE 50 MG
12.5 TABLET ORAL
Qty: 0 | Refills: 0 | Status: DISCONTINUED | OUTPATIENT
Start: 2019-01-06 | End: 2019-01-09

## 2019-01-06 RX ORDER — AZITHROMYCIN 500 MG/1
500 TABLET, FILM COATED ORAL EVERY 24 HOURS
Qty: 0 | Refills: 0 | Status: DISCONTINUED | OUTPATIENT
Start: 2019-01-06 | End: 2019-01-07

## 2019-01-06 RX ORDER — HEPARIN SODIUM 5000 [USP'U]/ML
5000 INJECTION INTRAVENOUS; SUBCUTANEOUS EVERY 12 HOURS
Qty: 0 | Refills: 0 | Status: DISCONTINUED | OUTPATIENT
Start: 2019-01-06 | End: 2019-01-09

## 2019-01-06 RX ORDER — MAGNESIUM SULFATE 500 MG/ML
2 VIAL (ML) INJECTION ONCE
Qty: 0 | Refills: 0 | Status: COMPLETED | OUTPATIENT
Start: 2019-01-06 | End: 2019-01-06

## 2019-01-06 RX ORDER — ATORVASTATIN CALCIUM 80 MG/1
40 TABLET, FILM COATED ORAL AT BEDTIME
Qty: 0 | Refills: 0 | Status: DISCONTINUED | OUTPATIENT
Start: 2019-01-06 | End: 2019-01-09

## 2019-01-06 RX ORDER — UMECLIDINIUM 62.5 UG/1
0 AEROSOL, POWDER ORAL
Qty: 0 | Refills: 0 | COMMUNITY

## 2019-01-06 RX ORDER — SODIUM CHLORIDE 9 MG/ML
1000 INJECTION INTRAMUSCULAR; INTRAVENOUS; SUBCUTANEOUS ONCE
Qty: 0 | Refills: 0 | Status: COMPLETED | OUTPATIENT
Start: 2019-01-06 | End: 2019-01-06

## 2019-01-06 RX ORDER — DEXTROSE 50 % IN WATER 50 %
12.5 SYRINGE (ML) INTRAVENOUS ONCE
Qty: 0 | Refills: 0 | Status: DISCONTINUED | OUTPATIENT
Start: 2019-01-06 | End: 2019-01-09

## 2019-01-06 RX ORDER — VALSARTAN 80 MG/1
80 TABLET ORAL DAILY
Qty: 0 | Refills: 0 | Status: DISCONTINUED | OUTPATIENT
Start: 2019-01-06 | End: 2019-01-09

## 2019-01-06 RX ORDER — GLUCAGON INJECTION, SOLUTION 0.5 MG/.1ML
1 INJECTION, SOLUTION SUBCUTANEOUS ONCE
Qty: 0 | Refills: 0 | Status: DISCONTINUED | OUTPATIENT
Start: 2019-01-06 | End: 2019-01-09

## 2019-01-06 RX ORDER — TAMSULOSIN HYDROCHLORIDE 0.4 MG/1
0.4 CAPSULE ORAL AT BEDTIME
Qty: 0 | Refills: 0 | Status: DISCONTINUED | OUTPATIENT
Start: 2019-01-06 | End: 2019-01-09

## 2019-01-06 RX ORDER — BUDESONIDE, MICRONIZED 100 %
0.5 POWDER (GRAM) MISCELLANEOUS
Qty: 0 | Refills: 0 | Status: DISCONTINUED | OUTPATIENT
Start: 2019-01-06 | End: 2019-01-09

## 2019-01-06 RX ORDER — IPRATROPIUM/ALBUTEROL SULFATE 18-103MCG
3 AEROSOL WITH ADAPTER (GRAM) INHALATION EVERY 4 HOURS
Qty: 0 | Refills: 0 | Status: DISCONTINUED | OUTPATIENT
Start: 2019-01-06 | End: 2019-01-09

## 2019-01-06 RX ORDER — PANTOPRAZOLE SODIUM 20 MG/1
40 TABLET, DELAYED RELEASE ORAL
Qty: 0 | Refills: 0 | Status: DISCONTINUED | OUTPATIENT
Start: 2019-01-06 | End: 2019-01-09

## 2019-01-06 RX ORDER — INSULIN LISPRO 100/ML
VIAL (ML) SUBCUTANEOUS EVERY 6 HOURS
Qty: 0 | Refills: 0 | Status: DISCONTINUED | OUTPATIENT
Start: 2019-01-06 | End: 2019-01-06

## 2019-01-06 RX ORDER — AZITHROMYCIN 500 MG/1
500 TABLET, FILM COATED ORAL ONCE
Qty: 0 | Refills: 0 | Status: COMPLETED | OUTPATIENT
Start: 2019-01-06 | End: 2019-01-06

## 2019-01-06 RX ADMIN — AZITHROMYCIN 255 MILLIGRAM(S): 500 TABLET, FILM COATED ORAL at 15:19

## 2019-01-06 RX ADMIN — Medication 0.5 MILLIGRAM(S): at 15:15

## 2019-01-06 RX ADMIN — HEPARIN SODIUM 5000 UNIT(S): 5000 INJECTION INTRAVENOUS; SUBCUTANEOUS at 11:57

## 2019-01-06 RX ADMIN — Medication 2: at 15:00

## 2019-01-06 RX ADMIN — Medication 3 MILLILITER(S): at 09:15

## 2019-01-06 RX ADMIN — Medication 0.5 MILLIGRAM(S): at 19:43

## 2019-01-06 RX ADMIN — SODIUM CHLORIDE 1000 MILLILITER(S): 9 INJECTION INTRAMUSCULAR; INTRAVENOUS; SUBCUTANEOUS at 09:48

## 2019-01-06 RX ADMIN — HEPARIN SODIUM 5000 UNIT(S): 5000 INJECTION INTRAVENOUS; SUBCUTANEOUS at 18:56

## 2019-01-06 RX ADMIN — TAMSULOSIN HYDROCHLORIDE 0.4 MILLIGRAM(S): 0.4 CAPSULE ORAL at 21:28

## 2019-01-06 RX ADMIN — Medication 2 GRAM(S): at 10:15

## 2019-01-06 RX ADMIN — Medication 40 MILLIGRAM(S): at 21:28

## 2019-01-06 RX ADMIN — Medication 3 MILLILITER(S): at 22:51

## 2019-01-06 RX ADMIN — Medication 3 MILLILITER(S): at 19:43

## 2019-01-06 RX ADMIN — Medication 50 GRAM(S): at 09:15

## 2019-01-06 RX ADMIN — SODIUM CHLORIDE 1000 MILLILITER(S): 9 INJECTION INTRAMUSCULAR; INTRAVENOUS; SUBCUTANEOUS at 10:15

## 2019-01-06 RX ADMIN — Medication 3 MILLILITER(S): at 15:28

## 2019-01-06 RX ADMIN — Medication 125 MILLIGRAM(S): at 09:30

## 2019-01-06 RX ADMIN — Medication 3 MILLILITER(S): at 09:25

## 2019-01-06 RX ADMIN — ATORVASTATIN CALCIUM 40 MILLIGRAM(S): 80 TABLET, FILM COATED ORAL at 21:28

## 2019-01-06 RX ADMIN — Medication 12.5 MILLIGRAM(S): at 18:56

## 2019-01-06 NOTE — CONSULT NOTE ADULT - SUBJECTIVE AND OBJECTIVE BOX
[facility]         Saud Aguilar, Dani, Génesis, Medina, Carroll, Kumar        [phone] (office)    CHIEF COMPLAINT: Patient is a 78y old  Male who presents with a chief complaint of     HPI:  77 yo M with Hx asthma, COPD with frequent admissions to this and other hospitals, chronic hypoxemic respiratory failure, CAD with 3v CABG 2004, without any other more recent cardiac issue.  His wife reports a normal stress test in 8/2018.  He has peripheral vascular disease status post stents years ago, which have been found patent by us.  He was here in October with resp failure, complicated by a bradycardic/PEA arrest. Echo during that hospitalization revealed a normal lvef without significant valve disease.    He has just recently completed a dosepak of steroids within the last week.  He was having some dyspnea and cough.  His wife found that he had taken off his bipap last night.  He becasme increasingly sob this am, and although he got into the car himself, on arrival to er, he could not even get out.     He was found to have acute hypercarbic resp failure, with a pH of 7.15.  He is now on bipap, and consultation is now being obtained.  His wife does not believe he has had any recent cp, edema, palpitations or syncope.         PAST MEDICAL & SURGICAL HISTORY:  PVD (peripheral vascular disease)  Hypertension  COPD (chronic obstructive pulmonary disease)  Osteomyelitis  Dyslipidemia  CAD (Coronary Artery Disease)  Diabetes Mellitus, Type II  CABG (Coronary Artery Bypass Graft)      SOCIAL HISTORY: No active tobacco, alcohol or illicit drug use.  Former smoker    FAMILY HISTORY:  Family history of diabetes mellitus (Sibling)       Outpatient medications:  flomax 0.4, metf 1000 bid, valorie 40, plavix 75, glip 2.5 bid, daliaresp, valsartan 80, metop 12.5 bid, asa 81  MEDICATIONS  (STANDING):  magnesium sulfate  IVPB 2 Gram(s) IV Intermittent Once    MEDICATIONS  (PRN):      Allergies    No Known Allergies    Intolerances    shellfish (Nausea)      REVIEW OF SYSTEMS: Is negative for eye, ENT, GI, , allergic, dermatologic, musculoskeletal and neurologic, except as described above.    VITAL SIGNS:   Vital Signs Last 24 Hrs  T(C): 36.4 (06 Jan 2019 09:04), Max: 36.4 (06 Jan 2019 09:04)  T(F): 97.5 (06 Jan 2019 09:04), Max: 97.5 (06 Jan 2019 09:04)  HR: 112 (06 Jan 2019 09:42) (112 - 135)  BP: 131/70 (06 Jan 2019 09:42) (131/70 - 138/70)  BP(mean): --  RR: 15 (06 Jan 2019 09:42) (15 - 15)  SpO2: 99% (06 Jan 2019 09:42) (50% - 99%)    I&O's Summary      PHYSICAL EXAM:    Constitutional: mod resp distress, on biap, leth but arousable well-developed  Eyes:  EOMI, no oral cyanosis, conjunctivae clear, anicteric.  Pulmonary: labored,  wheezing ester  Cardiovascular:  regular S1 and S2. tachy No murmur.  No rubs, gallops or clicks  Gastrointestinal: Bowel Sounds present, soft, nontender.   Lymph: No peripheral edema.   Neurological: leth  Skin: No obvious lesions/rashes.   Psych:  leth.    LABS: All Labs Reviewed:                        13.6   12.35 )-----------( 262      ( 06 Jan 2019 09:36 )             44.4     06 Jan 2019 09:36    138    |  99     |  15     ----------------------------<  308    5.0     |  33     |  1.20     Ca    9.0        06 Jan 2019 09:36  Mg     1.7       06 Jan 2019 09:36    TPro  7.1    /  Alb  3.6    /  TBili  0.4    /  DBili  x      /  AST  21     /  ALT  41     /  AlkPhos  100    06 Jan 2019 09:36    PT/INR - ( 06 Jan 2019 09:36 )   PT: 10.4 sec;   INR: 0.92 ratio         PTT - ( 06 Jan 2019 09:36 )  PTT:31.3 sec  CARDIAC MARKERS ( 06 Jan 2019 09:36 )  <.015 ng/mL / x     / x     / x     / 5.0 ng/mL      Blood Culture:   01-06 @ 09:36  Pro Bnp 136        RADIOLOGY:  < from: TTE Echo Doppler w/o Cont (10.26.18 @ 19:52) >     EXAM:  ECHO TTE W/O CON COMP W/DOPPLR         PROCEDURE DATE:  10/26/2018        INTERPRETATION:  Ordering Physician: RICKEY TRUJILLO    Indication: Cardiac arrest    Technician: BALDOMERO    Study Quality: Poor given that the patient was supine in the ICU   A complete echocardiographic study was performed utilizing standard   protocol including spectral and color Doppler in all echocardiographic   windows.    Height: 180 cm  Weight: 99 kg  Blood Pressure: 54/74    MEASUREMENTS  IVS: 0.99 cm  PWT: 0.78cm  LA: 3.4cm  AO: 2.9cm  LVIDd: Unable to measured given poor endocardial border definition  LVIDs: Unable to measure given poor endocardial border definition      LVEF: Visually estimated in the LV short axis view (best images of the   LV) is 55-60 %  RVSP: Unable to assess given lack of adequate TR waveform  RA Pressure: 15 mmHg  IVC: Dilated at 2.4 cm in diameter and collapses less than 50% with   inspiration (not valid if patient is intubated and on mechanical   ventilatory support)    FINDINGS  Left Ventricle: Over the left ventricle is poorly visualized. There was   best visualized in the LV short axis view. The visual estimation of the   ejection fraction is 55-60%. I'm unable to rule out regional wall motion   abnormalities given the poor acoustic windows.  Right Ventricle: Overall poorly visualized  Left Atrium: Grossly normal in size  Right Atrium: Overall poorly visualized  Mitral Valve: Normal in structure with trace mitral valve regurgitation  Aortic Valve: Trileaflet and sclerotic with no evidence of significant   insufficiency. No stenosis  Tricuspid Valve: Overall poorly visualized. There was trace tricuspid   valve regurgitation  Pulmonic Valve: Poorly visualized and poor Doppler interrogation  Diastolic Function: The LV diastolic function is indeterminate in this   study  Pericardium/Pleura: No significant pericardial or pleural effusions noted      CONCLUSIONS:  1. Normal technically limited study.  2. The left ventricle was best visualized in the shortaxis view. The LV   systolic function in that view appears preserved with a visually   estimated ejection fraction of 55-60%.  3. I'm unable to rule out regional wall motion abnormalities given the   poor endomyocardial border definition.  4. Normal mitral valve with trace regurgitation.  5. Sclerotic trileaflet aortic valve with no insufficiency or stenosis.  6. Poorly visualized right-sided chambers.  7. Limited study to assess pulmonary artery systolic pressure.                  MAC TAYLOR   This document has been electronically signed. Oct 27 2018 11:31AM                < end of copied text >    EKG: st rad

## 2019-01-06 NOTE — H&P ADULT - PROBLEM SELECTOR PLAN 4
- glucose elevation might be due to steroid use  - continue LINDA, accuchecks, hypoglycemia protocol  - DASH/consistent carb diet once off bipap - , likely 2/2 missing am meds and recent steroid use.   - a1c 6.3  - continue LINDA, accuchecks, hypoglycemia protocol  - DASH/consistent carb diet once off bipap

## 2019-01-06 NOTE — H&P ADULT - PROBLEM SELECTOR PLAN 5
- continue atorvastatin - unable to visualize TM with otoscope due to hair and cerumen obstruction, no external erythema or edema visualized, no pain on palpation around ear  - patient receiving zithromax for copd exacerbation, continue zithromax for coverage of possible otitis media

## 2019-01-06 NOTE — CONSULT NOTE ADULT - ATTENDING COMMENTS
Patient seen and evaluated independently from above.     This is a 78-year-old male with a history of Asthma/COPD with chronic hypoxic and hypercapnic respiratory failure on home oxygen and BiPAP qhs, recent PEA cardiac arrest in October 2/2 hypercapnia, HTN, HLD, CAD s/p CABG, PVD s/p stents, and DM2 who presents with acute hypoxic and hypercapnic respiratory failure due to bipap non-adherence. Per wife, he has only been wearing his bipap about 1-3 hours per night. Recently, he has been having increased weakness with a cough that developed about 3 days ago. Yesterday, the grandchildren were visiting, one grandchild is recovering from cold.  Presentation consistent with acute on chronic hypercapnic respiratory failure due to bipap non-adherence + viral URI.     - Rapid correction of hypercapnia with bipap, pH improved from 7.15 to 7.29, pCO2 decreased from 90 to 67  - Patient awake and alert, no respiratory distress. Has chronic wheezing on exam with prolonged expiratory flow, which is stable from previous examinations  - Would keep bipap on until the morning, then rest with NC if remains stable  - Agree with solumedrol, duonebs, ICS/LABA  - Given patient's improvement and current stable condition, he does not require ICU care at this time  - Please call back if patient's respiratory status worsens  - Discussed with patient, wife, and team

## 2019-01-06 NOTE — ED PROVIDER NOTE - PROGRESS NOTE DETAILS
paged Pulm Dr. Dejesus, awaiting call back called ICU PA, will come to see the patient called ICU JONAH Brown, states he will come to see the patient

## 2019-01-06 NOTE — CONSULT NOTE ADULT - ASSESSMENT
COMMODORE TURNER [facility] P    ALLERGY Shellfish  CONTACT Sp Amira EATON    REASON FOR VISIT   Initial evaluation/Pulmonary consultation requested  1/6/2019 from Dr Dejesus by Dr Alston    Patient examined chart reviewed  HOSPITAL ADMISSION [facility] Hospitalist 1/6/2019   PATIENT CAME  FROM (if information available)      PAST MEDICAL & SURGICAL HISTORY:  PVD (peripheral vascular disease)  Hypertension  COPD (chronic obstructive pulmonary disease)  Osteomyelitis  Dyslipidemia  CAD (Coronary Artery Disease)  Diabetes Mellitus, Type II  CABG (Coronary Artery Bypass Graft)    HOSPITAL STAYS   12/21-12/24/2018  10/26-11/2/2018 6/11-6/14/2018 1/20-1/23/2018    PATIENT PROBLEMS   10/26-11/2/2018  ADMISSION [facility] DR DU FREEMAN    CARDIAC ARREST 10/26/2018   TARGETED TEMPERATURE THERAPY POST CAC 10/26/2018   INTUBATION MECHANICAL VENT 10/26/2018  IV SEDATIVE DRIP FOR MECHANICAL VENTILATION    COPD EX   HYPERGLYCEMIA REQUIRED INSULIN DRIO 10/26-10/28/2018   MERRILL NONOLIGURIC 10/26/2018     VITALS/LABS                           1/6/2019 112 130/70   1/6/2019 W 12.3 Hb 13.6 Plt 262 INR .9 Na 138 K 5 CO2 33 Cr 1.2   12/24/2018 afeb 87 128/81 18 97%   12/24/2018 W 9.4 Hb 12.2 Plt 300 Na 142 K 4.5 CO2 36 Cr 1.2     GLOBAL ISSUE/BEST PRACTICE:      PROBLEM: HOB elevation:   y            PROBLEM: Stress ulcer proph:                      PROBLEM: VTE prophylaxis:        PROBLEM: Glycemic control:      PROBLEM: Nutrition:    PROBLEM: Advanced directive: na     PROBLEM: Allergies:  shellfish  PROCEDURE 10/29/2018 EXtubation   PEROCEDURE 10/26/2018 ET intubation    REVIEW OF SYMPTOMS    Able to give ROS  NO     PHYSICAL EXAM    HEENT Unremarkable PERRLA atraumatic   RESP Fair air entry EXP prolonged    Harsh breath sound Resp distres mild   CARDIAC S1 S2 No S3     NO JVD    ABDOMEN SOFT BS PRESENT NOT DISTENDED No hepatosplenomegaly PEDAL EDEMA present No calf tenderness  NO rash   GENERAL Not TOXIC looking    PATIENT DESCRIPTION PATIENT COMMODORE TURNER ;  ADMISSION [facility] HOSPITALIST   79 y/o male pmhx HTN, PVD, DM, Asthma, COPD on 2L home O2, hx CABGx3, HLD presented to ED 1/6/2019  w/ SOB and increased work of breathing.     MEDS   12/16/2018  Breo 200   Incruse   Daliresp  Glipizide 2.5x2   metformin 1000.2   Lopressor 12.5x2   Valsart 80    Tamsulosin .4   Atorvastat 40   Plavix 75     PROBLEM SPECIFIC PATIENT DATA PATIENT COMMODORE YUE 1/6/2019   ADMISSION [facility] P HOSPITALIST    ACUTE COMBINED HYPOXIC HYPERCAPNIC RESP FAILURE   1/6/2019 9a .4 715/90/196   A/R If BPAP fails in 1 h consider itubatioin and icu   RESP TRACT INFECTION  1/6/2019 W 12.3   1/6/2019 CXR no infiltrates   A/R Check pc bc sero   COPD ex   A/R BD Steroids   DM   A/R Sl scale   RO MI   1/6/2019 Tr 1 n  A/R Check c enz   CHF  10/26/2018 ECHO ef 55%   12/20/2017 ECHO  n lvsf diast dysfn ef 55%     TIME SPENT Over 55 minutes aggregate care time spent on encounter; activities included   direct patient care, counseling and/or coordinating care reviewing notes, lab data/ imaging , discussion with multidisciplinary team/ patient  /family. Risks, benefits, alternatives  discussed in detail.

## 2019-01-06 NOTE — H&P ADULT - PROBLEM SELECTOR PLAN 2
- continue home med metoprolol, valsartan with hold prameters - currently stable.   - continue home med metoprolol, valsartan with hold parameters

## 2019-01-06 NOTE — H&P ADULT - PROBLEM SELECTOR PLAN 7
- protonix while on steroids  - heparin for dvt ppx  - NPO while on bipap    IMPROVE VTE Individual Risk Assessment          RISK                                                          Points    [  ] Previous VTE                                                3  [  ] Thrombophilia                                             2  [  ] Lower limb paralysis                                   2        (unable to hold up >15 seconds)    [  ] Current Cancer                                            2         (within 6 months)  [x ] Immobilization > 24 hrs                              1  [  ] ICU/CCU stay > 24 hours                            1  [x ] Age > 60                                                    1    IMPROVE VTE Score _2___

## 2019-01-06 NOTE — ED ADULT NURSE NOTE - NSIMPLEMENTINTERV_GEN_ALL_ED
Implemented All Universal Safety Interventions:  [place] to call system. Call bell, personal items and telephone within reach. Instruct patient to call for assistance. Room bathroom lighting operational. Non-slip footwear when patient is off stretcher. Physically safe environment: no spills, clutter or unnecessary equipment. Stretcher in lowest position, wheels locked, appropriate side rails in place.

## 2019-01-06 NOTE — H&P ADULT - PROBLEM SELECTOR PLAN 1
- COPD exacerbation likely due to bipap noncompliance with possible viral illness, CXR negative for acute findings   - Continue bipap for respiratory support for now, pending repeat ABG for assessment of resp acidosis   - continue solu-medrol 40 TID, duoneb Q4H, pulmicort BID  - evaluated by Pulm (Dr. Dejesus), Cardio (Dr. Capps), and ICU PA  - f/u full RVP panel due to recent hospitalization and sick contact, f/u legionella, strep urine antigen   - no evidence of DVT, ddimer wnl  - f/u am labs - COPD exacerbation likely due to bipap noncompliance with possible viral illness, CXR negative for acute findings   - Continue bipap for respiratory support for now, pending repeat ABG for assessment of resp acidosis   - continue solu-medrol 40 TID, duoneb Q4H, pulmicort BID  - evaluated by Pulm (Dr. Dejesus), Cardio (Dr. Capps), and ICU PA  - f/u full RVP panel due to recent hospitalization and sick contact, f/u legionella, strep urine antigen   - no evidence of DVT, ddimer wnl, CEx2 negative, TTE in 10/2018 showed normal LVEF with no significant valvular disease   - f/u am labs, repeat ABG, blood cx, 3rd set of CE - COPD exacerbation likely due possible viral illness, patient also noncompliant with nightly bipap use. CXR negative for acute findings  - Continue bipap for respiratory support for now, pending repeat ABG for assessment of resp acidosis   - continue solu-medrol 40 TID, duoneb Q4H, pulmicort BID  - evaluated by Pulm (Dr. Dejesus), Cardio (Dr. Capps), and ICU PA  - f/u full RVP panel due to recent hospitalization and sick contact, f/u legionella, strep urine antigen   - no evidence of DVT, ddimer wnl, CEx2 negative, TTE in 10/2018 showed normal LVEF with no significant valvular disease   - f/u am labs, repeat ABG, blood cx, 3rd set of CE - COPD exacerbation likely due possible viral illness, patient also noncompliant with nightly bipap use. CXR negative for acute findings  - Continue bipap for respiratory support for now, acidosis improving. pending repeat ABG for assessment   - s/p solumedrol 125 x1 continue solu-medrol 40 TID  - c/w duoneb Q4H, pulmicort BID  - evaluated by Pulm (Dr. Dejesus), Cardio (Dr. Capps), and ICU PA  - f/u full RVP panel due to recent hospitalization and sick contact, f/u legionella, strep urine antigen   - no evidence of DVT, ddimer wnl, CEx2 negative, TTE in 10/2018 showed normal LVEF with no significant valvular disease   - f/u am labs, repeat ABG, blood cx, 3rd set of CE

## 2019-01-06 NOTE — H&P ADULT - NSHPPHYSICALEXAM_GEN_ALL_CORE
Physical Exam:  General: Well developed, well nourished, NAD, appears comfortable on bipap  HEENT: NCAT, PERRL, EOMI bl, TM not visualized due to b/l ear canals obstructed by hair and cerumen, tenderness of left ear to pulling of helix   Neck: Supple, nontender, no mass  Neurology: A&Ox3, nonfocal, sensation intact, answers all questions appropriately   Respiratory: prolonged expiratory wheezing, no accessory muscle use, on bipap  CV: RRR, +S1/S2, no murmurs, rubs or gallops  Abdominal: Soft, NT, ND +BSx4  Extremities: No C/C/E, + peripheral pulses  MSK: no joint erythema or warmth, no joint swelling   Skin: warm, dry, normal color

## 2019-01-06 NOTE — CONSULT NOTE ADULT - ASSESSMENT
79 yo M with Hx asthma, COPD with frequent admissions to this and other hospitals, chronic hypoxemic respiratory failure, CAD with 3v CABG 2004, without any other more recent cardiac issue.  His wife reports a normal stress test in 8/2018.  He has peripheral vascular disease status post stents years ago, which have been found patent by us.  He was here in October with resp failure, complicated by a bradycardic/PEA arrest. Echo during that hospitalization revealed a normal lvef without significant valve disease.    He has just recently completed a dosepak of steroids within the last week.  He was having some dyspnea and cough.  His wife found that he had taken off his bipap last night.  He becasme increasingly sob this am, and although he got into the car himself, on arrival to er, he could not even get out.     He was found to have acute hypercarbic resp failure, with a pH of 7.15.  He is now on bipap, and consultation is now being obtained.  His wife does not believe he has had any recent cp, edema, palpitations or syncope.    -there is no evidence of acute ischemia.  -known cad, s/p remote cabg and pad  -cont asa  -cont bb as yara  -cont atorvastatin  -first trop neg, though he could certainly evolve pos ce on the basis of hypoxia, demand.  can cycle ce x 3    -there is no evidence of significant arrhythmia.  -is at risk of af, and would monitor tele    -there is no evidence for meaningful  volume overload.    -bp controlled at present, cont valsartan    -bipap and resp treatments, including steroids, abx and inhaled bd, as per pulm, icu  -DVT prophylaxis  -monitor electrolytes, keep k>4, Mg>2  -will follow    The patient is at risk of abrupt decompensation.  35 minutes of critical care time was spent with this patient.

## 2019-01-06 NOTE — CONSULT NOTE ADULT - ASSESSMENT
79 yo F with history of COPD, asthma, DM, PVD, Dyslipidemia, CAD s.p CABG, HTN, multiple recent admissions for hypercapnic and hypoxic respiratory failure presents to [facility] today with dyspnea. Appears to be an acute COPD exacerbation leading to hypoxic and hypercapnic respiratory failure in the setting of BiPAP non compliance with possible viral illness (which is still in the process of being ruled out).     Will obtain one more ABG on BiPAP before making a final disposition. If pH improved then patient may go to medical floor, if pH not improved will take to ICU for further observation and management.     Plan Discussed with ICU attending Dr. Marcelino      CV: 77 yo F with history of COPD, asthma, DM, PVD with bilateral LE stents, Dyslipidemia, CAD s.p CABG, HTN, multiple recent admissions for hypercapnic and hypoxic respiratory failure presents to [facility] today with dyspnea. Appears to be an acute COPD exacerbation leading to hypoxic and hypercapnic respiratory failure in the setting of BiPAP non compliance with possible viral illness (which is still in the process of being ruled out).     Will obtain one more ABG on BiPAP before making a final disposition. If pH improved then patient may go to medical floor, if pH not improved will take to ICU for further observation and management.     Plan Discussed with ICU attending Dr. Marcelino    NEURO: Stable , no active issues     CV: Patient with extensive history of arteriosclerotic disease, PVD, CAD. Last Echo with normal LV systolic function. Would continue ASA, statin, plavix, beta-blocker, ARB. Replace Magnesium, keep K>4, Mag >2.     PULM: COPD exacerbation in the setting of BiPAP non-compliance. Need to r/o viral illness, legionella. Would keep the patient on BiPAP for now and repeat ABG. He appears to be improving. Send RVP, legionella antigen. Give nebulizers RTC, steroids q8, mucinex. CXR appears hyperinflated, however no evidence for infiltrate. Can send procal if concerned for bacterial infection but less likely. Social work and case management consult as patient non compliant with BiPAP and mask is not comfortable, maybe there is an alternative mask. No evidence of DVT on LE dupplex. BiPAP nocturnally and PRN. Restart home meds.     GI: NPO while on BiPAP. Can start DASH/carb diet once off bipap. Protonix daily for GI prophylaxis given steroids.    RENAL/LYTES: Some aspect of CKD, however no acute issue presently. Replace K, mag, phos as needed.    ID: WBC elevated, CXR without infiltrates, some sick contacts as home. Flu negative, would send full RVP panel. Check procal if concerned for bacterial infection but low likelihood. Would give azithromycin 500mg for 4 days for COPD exacerbation can give PO.      ENDO: Diabetic, check A1c, sliding scale and accuchecks. Check TSH, free T4.     HEME: DVT-P: heparin 5000 units q8     DISPO: repeat ABG if improved sent to floor, call ICU if repeat ABG not improved. For now dispo to floor. 77 yo F with history of COPD, asthma, DM, PVD with bilateral LE stents, Dyslipidemia, CAD s.p CABG, HTN, multiple recent admissions for hypercapnic and hypoxic respiratory failure presents to [facility] today with dyspnea. Appears to be an acute COPD exacerbation leading to hypoxic and hypercapnic respiratory failure in the setting of BiPAP non compliance with possible viral illness (which is still in the process of being ruled out).     ABG appears improved, patient appears better, can go to medical floors    Plan Discussed with ICU attending Dr. Marcelnio    NEURO: Stable , no active issues     CV: Patient with extensive history of arteriosclerotic disease, PVD, CAD. Last Echo with normal LV systolic function. Would continue ASA, statin, plavix, beta-blocker, ARB. Replace Magnesium, keep K>4, Mag >2.     PULM: COPD exacerbation in the setting of BiPAP non-compliance. Need to r/o viral illness, legionella. Would keep the patient on BiPAP for now and repeat ABG. He appears to be improving. Send RVP, legionella antigen. Give nebulizers RTC, steroids q8, mucinex. CXR appears hyperinflated, however no evidence for infiltrate. Can send procal if concerned for bacterial infection but less likely. Social work and case management consult as patient non compliant with BiPAP and mask is not comfortable, maybe there is an alternative mask. No evidence of DVT on LE dupplex. BiPAP nocturnally and PRN. Restart home meds.     GI: NPO while on BiPAP. Can start DASH/carb diet once off bipap. Protonix daily for GI prophylaxis given steroids.    RENAL/LYTES: Some aspect of CKD, however no acute issue presently. Replace K, mag, phos as needed.    ID: WBC elevated, CXR without infiltrates, some sick contacts as home. Flu negative, would send full RVP panel. Check procal if concerned for bacterial infection but low likelihood. Would give azithromycin 500mg for 4 days for COPD exacerbation can give PO.      ENDO: Diabetic, check A1c, sliding scale and accuchecks. Check TSH, free T4.     HEME: DVT-P: heparin 5000 units q8     DISPO: Dispo to floor

## 2019-01-06 NOTE — CONSULT NOTE ADULT - SUBJECTIVE AND OBJECTIVE BOX
Patient is a 78y old  Male who presents with a chief complaint of shortness of breath     HPI: 79 yo F with history of COPD, asthma, DM, PVD, Dyslipidemia, CAD s.p CABG, HTN, multiple recent admissions for hypercapnic and hypoxic respiratory failure presents to [facility] today with dyspnea. According to wife and patient over the last few days he has been feeling a little more short of breath and has developed a dry cough. Wife states he is not using his biPAP mask at night and may only keep it on for 1 -2 hours. This morning he appeared to have trouble breathing and his wife called the pulmonologist who recommended they come to the ER. The patient was able to walk to the car with some dyspnea however on the ride over to the ED he became more short of breath and almost passed out. On arrival to the ER he was found to be hypoxic to 50%, supplemental O2 was applied and he was placed on BiPAP. Initial ABG before biPAP was 7.15/90/136/24. ICU called for consult. Patient seen and examined at the bedside. He appears in mild respiratory distress and is on the BiPAP. He is alert and oriented and able to provide an appropriate history. Appears to have minimal work of breathing on BiPAP and states he feels comfortable. He confirms the story outlined above by his wife. To add his grandchildren visited yesterday and one of them had a cold and was sneezing a lot. He complains of SOB, sneezing, dry cough, and calf pain which has been going on for quite some time. He denies CP, fever, chills, abdominal pain, diarrhea, constipation, nausea, vomiting, dysuria, watery eyes, sore throat. He is compliant with his medications     PAST MEDICAL & SURGICAL HISTORY:  PVD (peripheral vascular disease)  Hypertension  COPD (chronic obstructive pulmonary disease)  Osteomyelitis  Dyslipidemia  CAD (Coronary Artery Disease)  Diabetes Mellitus, Type II  CABG (Coronary Artery Bypass Graft)    Allergies  No Known Allergies    Intolerances  shellfish (Nausea)    FAMILY HISTORY:  Family history of diabetes mellitus (Sibling)    Social History: Former smoker quit 30 years ago (20 pack year history), no recent alcohol use, no drug use    Review of Systems:    ALL OTHER REVIEW OF SYSTEMS EXCEPT PER HPI NEGATIVE.      Medications:  azithromycin  IVPB 500 milliGRAM(s) IV Intermittent once  ALBUTerol/ipratropium for Nebulization 3 milliLiter(s) Nebulizer every 4 hours  buDESOnide   0.5 milliGRAM(s) Respule 0.5 milliGRAM(s) Inhalation two times a day  heparin  Injectable 5000 Unit(s) SubCutaneous every 12 hours  dextrose 40% Gel 15 Gram(s) Oral once PRN  dextrose 50% Injectable 12.5 Gram(s) IV Push once  dextrose 50% Injectable 25 Gram(s) IV Push once  dextrose 50% Injectable 25 Gram(s) IV Push once  glucagon  Injectable 1 milliGRAM(s) IntraMuscular once PRN  insulin lispro (HumaLOG) corrective regimen sliding scale   SubCutaneous every 6 hours  methylPREDNISolone sodium succinate Injectable 40 milliGRAM(s) IV Push three times a day  dextrose 5%. 1000 milliLiter(s) IV Continuous <Continuous>    Vital Signs Last 24 Hrs  T(C): 36.4 (06 Jan 2019 09:04), Max: 36.4   T(F): 97.5 (06 Jan 2019 09:04), Max: 97.5   HR: 116   BP: 131/70   RR: 15 (0  SpO2: 94% on 25% FiO2 on BiPAP    ABG - ( 06 Jan 2019 11:28 )  pH, Arterial: 7.24  pH, Blood: x     /  pCO2: 71    /  pO2: 77    / HCO3: 25    / Base Excess: 2.4   /  SaO2: 93          I&O's Detail      LABS:                        13.6   12.35 )-----------( 262      ( 06 Jan 2019 09:36 )             44.4     01-06    138  |  99  |  15  ----------------------------<  308<H>  5.0   |  33<H>  |  1.20    Ca    9.0      06 Jan 2019 09:36  Mg     1.7     01-06    TPro  7.1  /  Alb  3.6  /  TBili  0.4  /  DBili  x   /  AST  21  /  ALT  41  /  AlkPhos  100  01-06      CARDIAC MARKERS ( 06 Jan 2019 09:36 )  <.015 ng/mL / x     / x     / x     / 5.0 ng/mL      CAPILLARY BLOOD GLUCOSE    PT/INR - ( 06 Jan 2019 09:36 )   PT: 10.4 sec;   INR: 0.92 ratio        PTT - ( 06 Jan 2019 09:36 )  PTT:31.3 sec      Physical Examination:    GENERAL: mild to moderate respiratory distress, tachypnea, overweight     EYES: Pupils equal, reactive to light.  Symmetric.    EARS, NOSE, THROAT: supple neck, trachea midline, no JVD, dry mucus membranes     PULM: Prolonged expiratory wheeze, worse at the end of expiration, poor air movement , tachypnea, no accessory muscle use     CVS: Regular rate and rhythm, no murmurs, rubs, or gallops    GI: Soft, nondistended, nontender, normoactive bowel sounds, no masses, no guarding    EXTREMITIES: No edema, no pain to palpation of calves or popliteal fossa     SKIN: Warm and well perfused, no rashes noted.    NEURO: Alert, oriented, interactive, nonfocal. Moves all 4 extremities, appropriate sensation.     DEVICES: BiPAP    RADIOLOGY:     EXAM:  XR CHEST PORTABLE URGENT 1V                            PROCEDURE DATE:  01/06/2019          INTERPRETATION:  Shortness of breath.    AP chest. Prior 12/21/2018.    Status post median sternotomy. No change heart mediastinum. Lungs are   emphysematous with increased lung volumes relatively oligemic upper lung   fields. Chronic accentuated bibasilar bronchovascular markings. No acute   infiltrate pneumothorax or pleural collection.    Impression: Uncomplicated COPD. No active infiltrates. Stable exam.        RODRIGO FAITH M.D., ATTENDING RADIOLOGIST  This document has been electronically signed. Jan 6 2019  9:46AM      CRITICAL CARE TIME SPENT: *** Patient is a 78y old  Male who presents with a chief complaint of shortness of breath     HPI: 79 yo F with history of COPD, asthma, DM, PVD, Dyslipidemia, CAD s.p CABG, HTN, multiple recent admissions for hypercapnic and hypoxic respiratory failure presents to [facility] today with dyspnea. According to wife and patient over the last few days he has been feeling a little more short of breath and has developed a dry cough. Wife states he is not using his biPAP mask at night and may only keep it on for 1 -2 hours. This morning he appeared to have trouble breathing and his wife called the pulmonologist who recommended they come to the ER. The patient was able to walk to the car with some dyspnea however on the ride over to the ED he became more short of breath and almost passed out. On arrival to the ER he was found to be hypoxic to 50%, supplemental O2 was applied and he was placed on BiPAP. Initial ABG before biPAP was 7.15/90/136/24. ICU called for consult. Patient seen and examined at the bedside. He appears in mild respiratory distress and is on the BiPAP. He is alert and oriented and able to provide an appropriate history. Appears to have minimal work of breathing on BiPAP and states he feels comfortable. He confirms the story outlined above by his wife. To add his grandchildren visited yesterday and one of them had a cold and was sneezing a lot. He complains of SOB, sneezing, dry cough, and calf pain which has been going on for quite some time. He denies CP, fever, chills, abdominal pain, diarrhea, constipation, nausea, vomiting, dysuria, watery eyes, sore throat. He is compliant with his medications     PAST MEDICAL & SURGICAL HISTORY:  PVD (peripheral vascular disease)  Hypertension  COPD (chronic obstructive pulmonary disease)  Osteomyelitis  Dyslipidemia  CAD (Coronary Artery Disease)  Diabetes Mellitus, Type II  CABG (Coronary Artery Bypass Graft)    Allergies  No Known Allergies    Intolerances  shellfish (Nausea)    FAMILY HISTORY:  Family history of diabetes mellitus (Sibling)    Social History: Former smoker quit 30 years ago (20 pack year history), no recent alcohol use, no drug use    Review of Systems:    ALL OTHER REVIEW OF SYSTEMS EXCEPT PER HPI NEGATIVE.      Medications:  azithromycin  IVPB 500 milliGRAM(s) IV Intermittent once  ALBUTerol/ipratropium for Nebulization 3 milliLiter(s) Nebulizer every 4 hours  buDESOnide   0.5 milliGRAM(s) Respule 0.5 milliGRAM(s) Inhalation two times a day  heparin  Injectable 5000 Unit(s) SubCutaneous every 12 hours  dextrose 40% Gel 15 Gram(s) Oral once PRN  dextrose 50% Injectable 12.5 Gram(s) IV Push once  dextrose 50% Injectable 25 Gram(s) IV Push once  dextrose 50% Injectable 25 Gram(s) IV Push once  glucagon  Injectable 1 milliGRAM(s) IntraMuscular once PRN  insulin lispro (HumaLOG) corrective regimen sliding scale   SubCutaneous every 6 hours  methylPREDNISolone sodium succinate Injectable 40 milliGRAM(s) IV Push three times a day  dextrose 5%. 1000 milliLiter(s) IV Continuous <Continuous>    Vital Signs Last 24 Hrs  T(C): 36.4 (06 Jan 2019 09:04), Max: 36.4   T(F): 97.5 (06 Jan 2019 09:04), Max: 97.5   HR: 116   BP: 131/70   RR: 15 (0  SpO2: 94% on 25% FiO2 on BiPAP    ABG - ( 06 Jan 2019 11:28 )  pH, Arterial: 7.24  pH, Blood: x     /  pCO2: 71    /  pO2: 77    / HCO3: 25    / Base Excess: 2.4   /  SaO2: 93          I&O's Detail      LABS:                        13.6   12.35 )-----------( 262      ( 06 Jan 2019 09:36 )             44.4     01-06    138  |  99  |  15  ----------------------------<  308<H>  5.0   |  33<H>  |  1.20    Ca    9.0      06 Jan 2019 09:36  Mg     1.7     01-06    TPro  7.1  /  Alb  3.6  /  TBili  0.4  /  DBili  x   /  AST  21  /  ALT  41  /  AlkPhos  100  01-06      CARDIAC MARKERS ( 06 Jan 2019 09:36 )  <.015 ng/mL / x     / x     / x     / 5.0 ng/mL      CAPILLARY BLOOD GLUCOSE    PT/INR - ( 06 Jan 2019 09:36 )   PT: 10.4 sec;   INR: 0.92 ratio        PTT - ( 06 Jan 2019 09:36 )  PTT:31.3 sec      Physical Examination:    GENERAL: mild to moderate respiratory distress, tachypnea, overweight     EYES: Pupils equal, reactive to light.  Symmetric.    EARS, NOSE, THROAT: supple neck, trachea midline, no JVD, dry mucus membranes     PULM: Prolonged expiratory wheeze, worse at the end of expiration, poor air movement , tachypnea, no accessory muscle use     CVS: Regular rate and rhythm, no murmurs, rubs, or gallops    GI: Soft, nondistended, nontender, normoactive bowel sounds, no masses, no guarding    EXTREMITIES: No edema, no pain to palpation of calves or popliteal fossa     SKIN: Warm and well perfused, no rashes noted.    NEURO: Alert, oriented, interactive, nonfocal. Moves all 4 extremities, appropriate sensation.     DEVICES: BiPAP    RADIOLOGY:     EXAM:  XR CHEST PORTABLE URGENT 1V                            PROCEDURE DATE:  01/06/2019          INTERPRETATION:  Shortness of breath.    AP chest. Prior 12/21/2018.    Status post median sternotomy. No change heart mediastinum. Lungs are   emphysematous with increased lung volumes relatively oligemic upper lung   fields. Chronic accentuated bibasilar bronchovascular markings. No acute   infiltrate pneumothorax or pleural collection.    Impression: Uncomplicated COPD. No active infiltrates. Stable exam.        RODRIGO FAITH M.D., ATTENDING RADIOLOGIST  This document has been electronically signed. Jan 6 2019  9:46AM      CRITICAL CARE TIME SPENT: 40 minutes assessing presenting problems of acute illness, which pose high probability of life threatening deterioration or end organ damage/dysfunction, as well as medical decision making including initiating plan of care, reviewing data, reviewing radiologic exams, discussing with multidisciplinary team,  discussing goals of care with patient/family, and writing this note.  Non-inclusive of procedures performed,

## 2019-01-06 NOTE — H&P ADULT - ASSESSMENT
Patient is a 79 yo male with pmhx of htn, DM2 (occasional insulin use when on steroids), asthma, COPD (2L home/ BIPAP at night), chronic hypoxemic resp failure, CABG, HLD, hypercapnic resp failure c/b with cardiac arrest, presenting to ED with SOB. Admitted for COPD exacerbation possibly due to bipap noncompliance with possible underlying viral URI Patient is a 79 yo male with pmhx of htn, DM2 (occasional insulin use when on steroids), asthma, COPD (2L home/ BIPAP at night), chronic hypoxemic resp failure, CABG, HLD, hypercapnic resp failure c/b with cardiac arrest, presenting to ED with SOB. Admitted for acute hypoxic, hypercapnic respiratory failure 2/2 COPD exacerbation possibly due to with possible underlying viral URI

## 2019-01-06 NOTE — ED PROVIDER NOTE - OBJECTIVE STATEMENT
78 male 78 male presents to ER in respiratory distress, o2 sat 50% on room air. Wife states patient was recently discharged from [facility], started having difficulty breathing last night, worsening this morning.

## 2019-01-06 NOTE — H&P ADULT - NSHPSOCIALHISTORY_GEN_ALL_CORE
lives at home with wife, able to perform all ADLs, hx of smoking quit 30 years ago, denies etoh or drug use

## 2019-01-06 NOTE — ED ADULT NURSE NOTE - NSFALLRSKUNASSIST_ED_ALL_ED
Chief Complaint   Patient presents with    Memory Loss     Alzheimer's       HPI    Ms. Misa Lazo is an 58-year-old woman here to follow-up on dementia. Last visit we began donepezil 5 mg. She is tolerating it fine. No side effects. Her level of function has not changed. She can do her own ADLs such as dressing. She has her systems in place remember her medications. She is not getting lost.  She is here with her daughter. Review of Systems   Unable to perform ROS: Dementia   Neurological: Negative for headaches. All other systems reviewed and are negative. Past Medical History:   Diagnosis Date    Bilateral dry eyes 2017    Dr. Barron Hartmann    Cataract     Bilaterally. Dr. Payton Dunn. Dr. Rhea Shultz. Dr. Barron Hartmann.  Chest pain 10/2015, 2016    Dr. Campbell Palma. negative Stress Test.    Dementia 07/2017    triggered by mild LACHELLE, depression. Dr. Russell Ordaz. Dr. Donnise Castleman Diverticulosis 09/2008    Dr. Prasad Arias. Dr. Haim Rivera.  DJD (degenerative joint disease) 09/30/05    cervical, worse C6-7, C7-T1. Left AC joint.  DJD (degenerative joint disease) of knee     bilateral  Dr. Riddhi Moody. Dr. Daksha NULL (dyspnea on exertion) 10/2015    Dr. Campbell Palma.  Essential hypertension, benign 1968    Foster child     GIB (gastrointestinal bleeding) 11/17/14    due to internal hemorrhoids. Dr. Jw Barillas Heart palpitations 11/12/15    due to PVCs. Dr. Bhumika Szymanski.  Heartburn     Hypercholesteremia     Hyperglycemia 2014    Internal carotid artery stenosis 2007    bilateral.  Dr. Adolph Bamberger Internal hemorrhoids 09/2008, 11/2014    Dr. Blanka Mason. Dr. Basil Simpson.  Knee pain     Left. Dr. Deborah Gutierrez.  Pulmonic valve insufficiency 10/2015    Mild to Mod. Dr. Campbell Palma.   EF 50-55%    PVD (peripheral vascular disease) ([facility] [address] 2007    Dr. Myah Parmar    Raynaud's phenomenon 1968    Shortening, leg, congenital     left    Vitamin D deficiency 10/10/10     Family History   Problem Relation Age of Onset    Heart Attack Mother 79    Heart Attack Sister      x 3 sisters    Heart Attack Brother      x 3 brothers     Social History     Social History    Marital status:      Spouse name: N/A    Number of children: N/A    Years of education: N/A     Occupational History    Not on file. Social History Main Topics    Smoking status: Former Smoker     Packs/day: 1.00     Years: 15.00     Types: Cigarettes     Quit date: 1/1/1968    Smokeless tobacco: Never Used    Alcohol use No    Drug use: No    Sexual activity: No     Other Topics Concern    Not on file     Social History Narrative     Allergies   Allergen Reactions    Penicillins Hives         Current Outpatient Prescriptions   Medication Sig    donepezil (ARICEPT) 10 mg tablet Take 1 Tab by mouth daily (with breakfast).  acetaminophen (TYLENOL) 325 mg tablet Take  by mouth every four (4) hours as needed for Pain.  lisinopril (PRINIVIL, ZESTRIL) 10 mg tablet TAKE 1 TABLET IN THE MORNING AND TAKE 2 TABLETS IN THE EVENING FOR HYPERTENSION    aspirin delayed-release 81 mg tablet Take 1 Tab by mouth daily. Indications: myocardial infarction prevention    Omega-3 Fatty Acids 300 mg cap Take 1 Cap by mouth daily.  Calcium-Cholecalciferol, D3, 600 mg(1,500mg) -400 unit cap Take  by mouth daily.  mometasone (NASONEX) 50 mcg/actuation nasal spray 2 Sprays by Both Nostrils route daily. Indications: ALLERGIC RHINITIS     No current facility-administered medications for this visit. Neurologic Exam     Mental Status        WD/WN adult in NAD, normal grooming  VSS  Awake and alert and pleasant. She does not know the day of the week or the exact date but she knows the month and year. [place] testing 14    PERRL, nonicteric  Face is symmetric, tongue midline  Speech is fluent and clear  No limb ataxia. No abnl movements.   Moving all extemities spontaneously and symmetric  Normal gait    CVS RRR  Lungs nonlabored  Skin is warm and dry         Visit Vitals    /60    Pulse 65    Resp 16    Ht 4' 11\" (1.499 m)    Wt 73.5 kg (162 lb)    SpO2 95%    BMI 32.72 kg/m2       Assessment and Plan   Diagnoses and all orders for this visit:    1. Late onset Alzheimer's disease without behavioral disturbance    Other orders  -     donepezil (ARICEPT) 10 mg tablet; Take 1 Tab by mouth daily (with breakfast). 69-year-old woman who has Alzheimer's with objective findings on neuropsychological testing. We completed [place] testing in office which she scored 14 which is consistent with her diagnosis. I am going to be more aggressive and titrate donepezil from 5 to more therapeutic 10 mg. Side effects have been discussed with her. I want her to continue doing her physical daily mental exercises. Go for walks. Puzzles and games to help with thinking and concentration. Watch for mood changes. I would like to see her in 3 months.         [address] [facility], [address]  Diplomate ROSALIND no

## 2019-01-06 NOTE — ED ADULT NURSE NOTE - OBJECTIVE STATEMENT
patient comes to ED for evaluation of shortness of breath has hx of COPD with hospitalization within the last few weeks pt presents with home O2 in place with O@ sat apprpox 62 patient awake alert 100%NRB applied Dr. Alston at bedside CM=ST breath sounds diminished

## 2019-01-06 NOTE — H&P ADULT - PROBLEM SELECTOR PLAN 6
- protonix while on steroids  - heparin for dvt ppx    IMPROVE VTE Individual Risk Assessment          RISK                                                          Points    [  ] Previous VTE                                                3  [  ] Thrombophilia                                             2  [  ] Lower limb paralysis                                   2        (unable to hold up >15 seconds)    [  ] Current Cancer                                            2         (within 6 months)  [x ] Immobilization > 24 hrs                              1  [  ] ICU/CCU stay > 24 hours                            1  [x ] Age > 60                                                    1    IMPROVE VTE Score _2___ - protonix while on steroids  - heparin for dvt ppx  - NPO while on bipap    IMPROVE VTE Individual Risk Assessment          RISK                                                          Points    [  ] Previous VTE                                                3  [  ] Thrombophilia                                             2  [  ] Lower limb paralysis                                   2        (unable to hold up >15 seconds)    [  ] Current Cancer                                            2         (within 6 months)  [x ] Immobilization > 24 hrs                              1  [  ] ICU/CCU stay > 24 hours                            1  [x ] Age > 60                                                    1    IMPROVE VTE Score _2___ - continue atorvastatin

## 2019-01-06 NOTE — H&P ADULT - NSHPREVIEWOFSYSTEMS_GEN_ALL_CORE
Constitutional: admits to increase fatigue, denies fever, chills, diaphoresis   HEENT: denies blurry vision, LEFT EAR PAIN x 3 days  Respiratory: admits to WORSENING SOB, LA, nonproductive cough, denies sputum production, hemoptysis  Cardiovascular: denies CP, palpitations, edema  Gastrointestinal: denies nausea, vomiting, diarrhea, constipation, abdominal pain, melena, hematochezia   Genitourinary: denies dysuria, frequency, urgency, hematuria   Musculoskeletal: denies myalgias, joint swelling, muscle weakness  Neurologic: denies headache, weakness, dizziness, paresthesias, numbness/tingling  ROS negative except as noted above

## 2019-01-06 NOTE — ED PROVIDER NOTE - MEDICAL DECISION MAKING DETAILS
respiratory distress, BIPAP, abg, labs, chest xray, duoneb, steroids, magnesium, pulm, cardio, icu consult

## 2019-01-07 ENCOUNTER — TRANSCRIPTION ENCOUNTER (OUTPATIENT)
Age: 79
End: 2019-01-07

## 2019-01-07 LAB
ALBUMIN SERPL ELPH-MCNC: 3 G/DL — LOW (ref 3.3–5)
ALP SERPL-CCNC: 80 U/L — SIGNIFICANT CHANGE UP (ref 40–120)
ALT FLD-CCNC: 31 U/L — SIGNIFICANT CHANGE UP (ref 12–78)
ANION GAP SERPL CALC-SCNC: 6 MMOL/L — SIGNIFICANT CHANGE UP (ref 5–17)
APPEARANCE UR: CLEAR — SIGNIFICANT CHANGE UP
AST SERPL-CCNC: 11 U/L — LOW (ref 15–37)
BASE EXCESS BLDA CALC-SCNC: 7.3 MMOL/L — HIGH (ref -2–2)
BILIRUB DIRECT SERPL-MCNC: 0.1 MG/DL — SIGNIFICANT CHANGE UP (ref 0.05–0.2)
BILIRUB INDIRECT FLD-MCNC: 0.2 MG/DL — SIGNIFICANT CHANGE UP (ref 0.2–1)
BILIRUB SERPL-MCNC: 0.3 MG/DL — SIGNIFICANT CHANGE UP (ref 0.2–1.2)
BILIRUB UR-MCNC: NEGATIVE — SIGNIFICANT CHANGE UP
BLOOD GAS COMMENTS ARTERIAL: SIGNIFICANT CHANGE UP
BLOOD GAS COMMENTS ARTERIAL: SIGNIFICANT CHANGE UP
BUN SERPL-MCNC: 16 MG/DL — SIGNIFICANT CHANGE UP (ref 7–23)
CALCIUM SERPL-MCNC: 9.2 MG/DL — SIGNIFICANT CHANGE UP (ref 8.5–10.1)
CHLORIDE SERPL-SCNC: 99 MMOL/L — SIGNIFICANT CHANGE UP (ref 96–108)
CK SERPL-CCNC: 55 U/L — SIGNIFICANT CHANGE UP (ref 26–308)
CO2 SERPL-SCNC: 34 MMOL/L — HIGH (ref 22–31)
COLOR SPEC: SIGNIFICANT CHANGE UP
CREAT SERPL-MCNC: 1 MG/DL — SIGNIFICANT CHANGE UP (ref 0.5–1.3)
DIFF PNL FLD: NEGATIVE — SIGNIFICANT CHANGE UP
GLUCOSE SERPL-MCNC: 233 MG/DL — HIGH (ref 70–99)
GLUCOSE UR QL: 1000 MG/DL
HBA1C BLD-MCNC: 6.4 % — HIGH (ref 4–5.6)
HCO3 BLDA-SCNC: 30 MMOL/L — HIGH (ref 23–27)
HCT VFR BLD CALC: 38 % — LOW (ref 39–50)
HGB BLD-MCNC: 11.7 G/DL — LOW (ref 13–17)
HOROWITZ INDEX BLDA+IHG-RTO: 25 — SIGNIFICANT CHANGE UP
INR BLD: 1.02 RATIO — SIGNIFICANT CHANGE UP (ref 0.88–1.16)
KETONES UR-MCNC: NEGATIVE — SIGNIFICANT CHANGE UP
LEGIONELLA AG UR QL: NEGATIVE — SIGNIFICANT CHANGE UP
LEUKOCYTE ESTERASE UR-ACNC: NEGATIVE — SIGNIFICANT CHANGE UP
MCHC RBC-ENTMCNC: 28 PG — SIGNIFICANT CHANGE UP (ref 27–34)
MCHC RBC-ENTMCNC: 30.8 GM/DL — LOW (ref 32–36)
MCV RBC AUTO: 90.9 FL — SIGNIFICANT CHANGE UP (ref 80–100)
NITRITE UR-MCNC: NEGATIVE — SIGNIFICANT CHANGE UP
NRBC # BLD: 0 /100 WBCS — SIGNIFICANT CHANGE UP (ref 0–0)
PCO2 BLDA: 55 MMHG — HIGH (ref 32–46)
PH BLDA: 7.39 — SIGNIFICANT CHANGE UP (ref 7.35–7.45)
PH UR: 5 — SIGNIFICANT CHANGE UP (ref 5–8)
PHOSPHATE SERPL-MCNC: 2.9 MG/DL — SIGNIFICANT CHANGE UP (ref 2.5–4.5)
PLATELET # BLD AUTO: 211 K/UL — SIGNIFICANT CHANGE UP (ref 150–400)
PO2 BLDA: 94 MMHG — SIGNIFICANT CHANGE UP (ref 74–108)
POTASSIUM SERPL-MCNC: 4.9 MMOL/L — SIGNIFICANT CHANGE UP (ref 3.5–5.3)
POTASSIUM SERPL-SCNC: 4.9 MMOL/L — SIGNIFICANT CHANGE UP (ref 3.5–5.3)
PROT SERPL-MCNC: 6.3 G/DL — SIGNIFICANT CHANGE UP (ref 6–8.3)
PROT UR-MCNC: NEGATIVE — SIGNIFICANT CHANGE UP
PROTHROM AB SERPL-ACNC: 11.6 SEC — SIGNIFICANT CHANGE UP (ref 10–12.9)
RBC # BLD: 4.18 M/UL — LOW (ref 4.2–5.8)
RBC # FLD: 12.5 % — SIGNIFICANT CHANGE UP (ref 10.3–14.5)
SAO2 % BLDA: 98 % — HIGH (ref 92–96)
SODIUM SERPL-SCNC: 139 MMOL/L — SIGNIFICANT CHANGE UP (ref 135–145)
SP GR SPEC: 1.01 — SIGNIFICANT CHANGE UP (ref 1.01–1.02)
TROPONIN I SERPL-MCNC: <.015 NG/ML — SIGNIFICANT CHANGE UP (ref 0.01–0.04)
UROBILINOGEN FLD QL: NEGATIVE — SIGNIFICANT CHANGE UP
WBC # BLD: 5.85 K/UL — SIGNIFICANT CHANGE UP (ref 3.8–10.5)
WBC # FLD AUTO: 5.85 K/UL — SIGNIFICANT CHANGE UP (ref 3.8–10.5)

## 2019-01-07 PROCEDURE — 99233 SBSQ HOSP IP/OBS HIGH 50: CPT | Mod: GC

## 2019-01-07 PROCEDURE — 99232 SBSQ HOSP IP/OBS MODERATE 35: CPT

## 2019-01-07 RX ORDER — AZITHROMYCIN 500 MG/1
500 TABLET, FILM COATED ORAL EVERY 24 HOURS
Qty: 0 | Refills: 0 | Status: DISCONTINUED | OUTPATIENT
Start: 2019-01-07 | End: 2019-01-09

## 2019-01-07 RX ORDER — AZITHROMYCIN 500 MG/1
500 TABLET, FILM COATED ORAL EVERY 24 HOURS
Qty: 0 | Refills: 0 | Status: DISCONTINUED | OUTPATIENT
Start: 2019-01-07 | End: 2019-01-07

## 2019-01-07 RX ADMIN — Medication 40 MILLIGRAM(S): at 15:04

## 2019-01-07 RX ADMIN — Medication 40 MILLIGRAM(S): at 21:34

## 2019-01-07 RX ADMIN — Medication 3 MILLILITER(S): at 11:11

## 2019-01-07 RX ADMIN — ATORVASTATIN CALCIUM 40 MILLIGRAM(S): 80 TABLET, FILM COATED ORAL at 21:34

## 2019-01-07 RX ADMIN — HEPARIN SODIUM 5000 UNIT(S): 5000 INJECTION INTRAVENOUS; SUBCUTANEOUS at 05:23

## 2019-01-07 RX ADMIN — CLOPIDOGREL BISULFATE 75 MILLIGRAM(S): 75 TABLET, FILM COATED ORAL at 12:24

## 2019-01-07 RX ADMIN — Medication 3 MILLILITER(S): at 19:46

## 2019-01-07 RX ADMIN — Medication 1: at 21:35

## 2019-01-07 RX ADMIN — Medication 1 TABLET(S): at 12:24

## 2019-01-07 RX ADMIN — HEPARIN SODIUM 5000 UNIT(S): 5000 INJECTION INTRAVENOUS; SUBCUTANEOUS at 17:49

## 2019-01-07 RX ADMIN — AZITHROMYCIN 500 MILLIGRAM(S): 500 TABLET, FILM COATED ORAL at 17:49

## 2019-01-07 RX ADMIN — Medication 3 MILLILITER(S): at 15:21

## 2019-01-07 RX ADMIN — PANTOPRAZOLE SODIUM 40 MILLIGRAM(S): 20 TABLET, DELAYED RELEASE ORAL at 05:24

## 2019-01-07 RX ADMIN — Medication 81 MILLIGRAM(S): at 12:24

## 2019-01-07 RX ADMIN — Medication 40 MILLIGRAM(S): at 05:23

## 2019-01-07 RX ADMIN — Medication 12.5 MILLIGRAM(S): at 17:49

## 2019-01-07 RX ADMIN — Medication 2: at 08:31

## 2019-01-07 RX ADMIN — TAMSULOSIN HYDROCHLORIDE 0.4 MILLIGRAM(S): 0.4 CAPSULE ORAL at 21:34

## 2019-01-07 RX ADMIN — Medication 3 MILLILITER(S): at 23:55

## 2019-01-07 RX ADMIN — Medication 3: at 12:24

## 2019-01-07 RX ADMIN — Medication 3 MILLILITER(S): at 08:19

## 2019-01-07 RX ADMIN — Medication 0.5 MILLIGRAM(S): at 08:19

## 2019-01-07 RX ADMIN — Medication 0.5 MILLIGRAM(S): at 19:46

## 2019-01-07 RX ADMIN — Medication 3 MILLILITER(S): at 02:20

## 2019-01-07 NOTE — DISCHARGE NOTE ADULT - CARE PROVIDER_API CALL
Arun Dejesus), Critical Care Medicine; Internal Medicine; Pulmonary Disease  [address] [place] [address]  Phone: [phone]  Fax: [phone]

## 2019-01-07 NOTE — DISCHARGE NOTE ADULT - PLAN OF CARE
Improvement - Continue inhaler and Prednisone as prescribed.  - Follow up with PMD and pulmonologist as outpatient within 1 week of discharge for further management. - Continue ASA and Plavix.  - Follow up with PMD as outpatient within 1 week of discharge for further management. - Continue home meds metoprolol and valsartan.  - Follow up with PMD as outpatient within 1 week of discharge for further management. - Continue your home diabetic medications.  - Follow up with PMD as outpatient within 1 week of discharge for further management. - Use your albuterol inhaler 4 times per day for the next 3-4 days and take the prednisone as prescribed for 4 more days starting tomorrow 1/10/19.  - Follow up with PMD and pulmonologist as outpatient within 1 week of discharge for further management. - Continue aspirin and Plavix and lipitor  - Follow up with cardiologist as outpatient within 1 week of discharge for further management. - Continue your home diabetic medication. Your hemoglobin A1c is 6.4% which shows you have good diabetic control.   - Follow up with PMD as outpatient within 1 week of discharge for further management.

## 2019-01-07 NOTE — PROGRESS NOTE ADULT - SUBJECTIVE AND OBJECTIVE BOX
Patient is a 78y old  Male who presents with a chief complaint of SOB (2019 11:34)      INTERVAL HPI/OVERNIGHT EVENTS: Patient seen and examined at bedside. He reports that he kept the BiPAP on overnight and feels he is breathing comfortably now on nasal cannula. O2 noted to be set at 3.5, decreased to 2L (patient's home level) without patient experiencing any difficulty breathing. Denies any CP, abdominal pain, N/V/D.    MEDICATIONS  (STANDING):  ALBUTerol/ipratropium for Nebulization 3 milliLiter(s) Nebulizer every 4 hours  aspirin enteric coated 81 milliGRAM(s) Oral daily  atorvastatin 40 milliGRAM(s) Oral at bedtime  azithromycin  IVPB 500 milliGRAM(s) IV Intermittent every 24 hours  buDESOnide   0.5 milliGRAM(s) Respule 0.5 milliGRAM(s) Inhalation two times a day  clopidogrel Tablet 75 milliGRAM(s) Oral daily  dextrose 5%. 1000 milliLiter(s) (50 mL/Hr) IV Continuous <Continuous>  dextrose 50% Injectable 12.5 Gram(s) IV Push once  dextrose 50% Injectable 25 Gram(s) IV Push once  dextrose 50% Injectable 25 Gram(s) IV Push once  heparin  Injectable 5000 Unit(s) SubCutaneous every 12 hours  insulin lispro (HumaLOG) corrective regimen sliding scale   SubCutaneous three times a day before meals  insulin lispro (HumaLOG) corrective regimen sliding scale   SubCutaneous at bedtime  methylPREDNISolone sodium succinate Injectable 40 milliGRAM(s) IV Push three times a day  metoprolol tartrate 12.5 milliGRAM(s) Oral two times a day  multivitamin 1 Tablet(s) Oral daily  pantoprazole    Tablet 40 milliGRAM(s) Oral before breakfast  tamsulosin 0.4 milliGRAM(s) Oral at bedtime  valsartan 80 milliGRAM(s) Oral daily    MEDICATIONS  (PRN):  dextrose 40% Gel 15 Gram(s) Oral once PRN Blood Glucose LESS THAN 70 milliGRAM(s)/deciliter  glucagon  Injectable 1 milliGRAM(s) IntraMuscular once PRN Glucose LESS THAN 70 milligrams/deciliter      Allergies    No Known Allergies    Intolerances    shellfish (Nausea)      REVIEW OF SYSTEMS:  CONSTITUTIONAL: No fever or chills  HEENT: No headache, no sore throat  RESPIRATORY: No cough, wheezing, or shortness of breath  CARDIOVASCULAR: No chest pain, palpitations, or leg swelling  GASTROINTESTINAL: No abd pain, nausea, vomiting, or diarrhea  GENITOURINARY: No dysuria, frequency, or hematuria  NEUROLOGICAL: No focal weakness or dizziness  MUSCULOSKELETAL: No myalgias     Vital Signs Last 24 Hrs  T(C): 36.6 (2019 11:42), Max: 36.8 (2019 17:09)  T(F): 97.9 (2019 11:42), Max: 98.2 (2019 17:09)  HR: 86 (2019 11:42) (64 - 108)  BP: 122/73 (2019 11:42) (107/69 - 146/74)  BP(mean): --  RR: 17 (2019 11:42) (16 - 24)  SpO2: 99% (2019 11:42) (94% - 100%)    PHYSICAL EXAM:  GENERAL: NAD  HEENT: EOMI, moist mucous membranes  CHEST/LUNG: Diffuse wheezes, breathing nonlabored on nasal cannula  HEART: RRR, S1, S2  ABDOMEN: BS+, soft, nontender, nondistended  EXTREMITIES: No edema, cyanosis, or calf tenderness  NERVOUS SYSTEM: AA&Ox3    LABS:                        11.7   5.85  )-----------( 211      ( 2019 07:09 )             38.0     CBC Full  -  ( 2019 07:09 )  WBC Count : 5.85 K/uL  Hemoglobin : 11.7 g/dL  Hematocrit : 38.0 %  Platelet Count - Automated : 211 K/uL  Mean Cell Volume : 90.9 fl  Mean Cell Hemoglobin : 28.0 pg  Mean Cell Hemoglobin Concentration : 30.8 gm/dL  Auto Neutrophil # : x  Auto Lymphocyte # : x  Auto Monocyte # : x  Auto Eosinophil # : x  Auto Basophil # : x  Auto Neutrophil % : x  Auto Lymphocyte % : x  Auto Monocyte % : x  Auto Eosinophil % : x  Auto Basophil % : x    2019 07:09    139    |  99     |  16     ----------------------------<  233    4.9     |  34     |  1.00     Ca    9.2        2019 07:09  Phos  2.9       2019 07:09    TPro  6.3    /  Alb  3.0    /  TBili  0.3    /  DBili  .10    /  AST  11     /  ALT  31     /  AlkPhos  80     2019 07:09    PT/INR - ( 2019 07:09 )   PT: 11.6 sec;   INR: 1.02 ratio    PTT - ( 2019 09:36 )  PTT:31.3 sec    Urinalysis Basic - ( 2019 11:34 )  Color: Pale Yellow / Appearance: Clear / S.010 / pH: x  Gluc: x / Ketone: Negative  / Bili: Negative / Urobili: Negative   Blood: x / Protein: Negative / Nitrite: Negative   Leuk Esterase: Negative / RBC: x / WBC x   Sq Epi: x / Non Sq Epi: x / Bacteria: x    CAPILLARY BLOOD GLUCOSE  POCT Blood Glucose.: 292 mg/dL (2019 11:35)  POCT Blood Glucose.: 226 mg/dL (2019 07:30)  POCT Blood Glucose.: 258 mg/dL (2019 23:38)        RADIOLOGY & ADDITIONAL TESTS:    Personally reviewed.     Consultant(s) Notes Reviewed:  [x] YES  [ ] NO Patient is a 78y old  Male who presents with a chief complaint of SOB (2019 11:34)      INTERVAL HPI/OVERNIGHT EVENTS: Patient seen and examined at bedside. He reports that he kept the BiPAP on overnight and feels he is breathing comfortably now on nasal cannula. O2 noted to be set at 3.5L, decreased to 2L (patient's home level) without patient experiencing any difficulty breathing. Denies any CP, abdominal pain, N/V/D.    MEDICATIONS  (STANDING):  ALBUTerol/ipratropium for Nebulization 3 milliLiter(s) Nebulizer every 4 hours  aspirin enteric coated 81 milliGRAM(s) Oral daily  atorvastatin 40 milliGRAM(s) Oral at bedtime  azithromycin  IVPB 500 milliGRAM(s) IV Intermittent every 24 hours  buDESOnide   0.5 milliGRAM(s) Respule 0.5 milliGRAM(s) Inhalation two times a day  clopidogrel Tablet 75 milliGRAM(s) Oral daily  dextrose 5%. 1000 milliLiter(s) (50 mL/Hr) IV Continuous <Continuous>  dextrose 50% Injectable 12.5 Gram(s) IV Push once  dextrose 50% Injectable 25 Gram(s) IV Push once  dextrose 50% Injectable 25 Gram(s) IV Push once  heparin  Injectable 5000 Unit(s) SubCutaneous every 12 hours  insulin lispro (HumaLOG) corrective regimen sliding scale   SubCutaneous three times a day before meals  insulin lispro (HumaLOG) corrective regimen sliding scale   SubCutaneous at bedtime  methylPREDNISolone sodium succinate Injectable 40 milliGRAM(s) IV Push three times a day  metoprolol tartrate 12.5 milliGRAM(s) Oral two times a day  multivitamin 1 Tablet(s) Oral daily  pantoprazole    Tablet 40 milliGRAM(s) Oral before breakfast  tamsulosin 0.4 milliGRAM(s) Oral at bedtime  valsartan 80 milliGRAM(s) Oral daily    MEDICATIONS  (PRN):  dextrose 40% Gel 15 Gram(s) Oral once PRN Blood Glucose LESS THAN 70 milliGRAM(s)/deciliter  glucagon  Injectable 1 milliGRAM(s) IntraMuscular once PRN Glucose LESS THAN 70 milligrams/deciliter      Allergies    No Known Allergies    Intolerances    shellfish (Nausea)      REVIEW OF SYSTEMS:  CONSTITUTIONAL: No fever or chills  HEENT: No headache, no sore throat  RESPIRATORY: improving cough and shortness of breath  CARDIOVASCULAR: No chest pain, palpitations, or leg swelling  GASTROINTESTINAL: No abd pain, nausea, vomiting, or diarrhea  GENITOURINARY: No dysuria, frequency, or hematuria  NEUROLOGICAL: No focal weakness or dizziness  MUSCULOSKELETAL: No myalgias     Vital Signs Last 24 Hrs  T(C): 36.6 (2019 11:42), Max: 36.8 (2019 17:09)  T(F): 97.9 (2019 11:42), Max: 98.2 (2019 17:09)  HR: 86 (2019 11:42) (64 - 108)  BP: 122/73 (2019 11:42) (107/69 - 146/74)  BP(mean): --  RR: 17 (2019 11:42) (16 - 24)  SpO2: 99% (2019 11:42) (94% - 100%)    PHYSICAL EXAM:  GENERAL: NAD  HEENT: EOMI, moist mucous membranes  CHEST/LUNG: Diffuse wheezes, breathing nonlabored on nasal cannula  HEART: RRR, S1, S2  ABDOMEN: BS+, soft, nontender, nondistended  EXTREMITIES: No edema, cyanosis, or calf tenderness  NERVOUS SYSTEM: AA&Ox3    LABS:                        11.7   5.85  )-----------( 211      ( 2019 07:09 )             38.0     CBC Full  -  ( 2019 07:09 )  WBC Count : 5.85 K/uL  Hemoglobin : 11.7 g/dL  Hematocrit : 38.0 %  Platelet Count - Automated : 211 K/uL  Mean Cell Volume : 90.9 fl  Mean Cell Hemoglobin : 28.0 pg  Mean Cell Hemoglobin Concentration : 30.8 gm/dL  Auto Neutrophil # : x  Auto Lymphocyte # : x  Auto Monocyte # : x  Auto Eosinophil # : x  Auto Basophil # : x  Auto Neutrophil % : x  Auto Lymphocyte % : x  Auto Monocyte % : x  Auto Eosinophil % : x  Auto Basophil % : x    2019 07:09    139    |  99     |  16     ----------------------------<  233    4.9     |  34     |  1.00     Ca    9.2        2019 07:09  Phos  2.9       2019 07:09    TPro  6.3    /  Alb  3.0    /  TBili  0.3    /  DBili  .10    /  AST  11     /  ALT  31     /  AlkPhos  80     2019 07:09    PT/INR - ( 2019 07:09 )   PT: 11.6 sec;   INR: 1.02 ratio    PTT - ( 2019 09:36 )  PTT:31.3 sec    Urinalysis Basic - ( 2019 11:34 )  Color: Pale Yellow / Appearance: Clear / S.010 / pH: x  Gluc: x / Ketone: Negative  / Bili: Negative / Urobili: Negative   Blood: x / Protein: Negative / Nitrite: Negative   Leuk Esterase: Negative / RBC: x / WBC x   Sq Epi: x / Non Sq Epi: x / Bacteria: x    CAPILLARY BLOOD GLUCOSE  POCT Blood Glucose.: 292 mg/dL (2019 11:35)  POCT Blood Glucose.: 226 mg/dL (2019 07:30)  POCT Blood Glucose.: 258 mg/dL (2019 23:38)        RADIOLOGY & ADDITIONAL TESTS:    Personally reviewed.     Consultant(s) Notes Reviewed:  [x] YES  [ ] NO

## 2019-01-07 NOTE — PROGRESS NOTE ADULT - SUBJECTIVE AND OBJECTIVE BOX
[facility] -- Saud Aguilar, Dani, Medina Capps Patel, Savella  Office # [phone]      Follow Up:    acute resp failure  Subjective/Observations:   No events overnight resting comfortably in bed.  No complaints of chest pain, dyspnea, or palpitations reported. No signs of orthopnea or PND.     REVIEW OF SYSTEMS: All other review of systems is negative unless indicated above    PAST MEDICAL & SURGICAL HISTORY:  PVD (peripheral vascular disease)  Hypertension  COPD (chronic obstructive pulmonary disease)  Osteomyelitis  Dyslipidemia  CAD (Coronary Artery Disease)  Diabetes Mellitus, Type II  CABG (Coronary Artery Bypass Graft)      MEDICATIONS  (STANDING):  ALBUTerol/ipratropium for Nebulization 3 milliLiter(s) Nebulizer every 4 hours  aspirin enteric coated 81 milliGRAM(s) Oral daily  atorvastatin 40 milliGRAM(s) Oral at bedtime  azithromycin  IVPB 500 milliGRAM(s) IV Intermittent every 24 hours  buDESOnide   0.5 milliGRAM(s) Respule 0.5 milliGRAM(s) Inhalation two times a day  clopidogrel Tablet 75 milliGRAM(s) Oral daily  dextrose 5%. 1000 milliLiter(s) (50 mL/Hr) IV Continuous <Continuous>  dextrose 50% Injectable 12.5 Gram(s) IV Push once  dextrose 50% Injectable 25 Gram(s) IV Push once  dextrose 50% Injectable 25 Gram(s) IV Push once  heparin  Injectable 5000 Unit(s) SubCutaneous every 12 hours  insulin lispro (HumaLOG) corrective regimen sliding scale   SubCutaneous three times a day before meals  insulin lispro (HumaLOG) corrective regimen sliding scale   SubCutaneous at bedtime  methylPREDNISolone sodium succinate Injectable 40 milliGRAM(s) IV Push three times a day  metoprolol tartrate 12.5 milliGRAM(s) Oral two times a day  multivitamin 1 Tablet(s) Oral daily  pantoprazole    Tablet 40 milliGRAM(s) Oral before breakfast  tamsulosin 0.4 milliGRAM(s) Oral at bedtime  valsartan 80 milliGRAM(s) Oral daily    MEDICATIONS  (PRN):  dextrose 40% Gel 15 Gram(s) Oral once PRN Blood Glucose LESS THAN 70 milliGRAM(s)/deciliter  glucagon  Injectable 1 milliGRAM(s) IntraMuscular once PRN Glucose LESS THAN 70 milligrams/deciliter      Allergies    No Known Allergies    Intolerances    shellfish (Nausea)      Vital Signs Last 24 Hrs  T(C): 36.6 (07 Jan 2019 07:33), Max: 36.8 (06 Jan 2019 17:09)  T(F): 97.9 (07 Jan 2019 07:33), Max: 98.2 (06 Jan 2019 17:09)  HR: 85 (07 Jan 2019 11:13) (64 - 108)  BP: 107/69 (07 Jan 2019 07:33) (107/69 - 146/74)  BP(mean): --  RR: 17 (07 Jan 2019 07:33) (16 - 24)  SpO2: 98% (07 Jan 2019 11:13) (94% - 100%)    I&O's Summary    06 Jan 2019 07:01  -  07 Jan 2019 07:00  --------------------------------------------------------  IN: 300 mL / OUT: 700 mL / NET: -400 mL          PHYSICAL EXAM:  TELE: NSR No events on tele overnight   Constitutional: NAD, awake and alert, well-developed  HEENT: Moist Mucous Membranes, Anicteric  Pulmonary: Non-labored, breath sounds with Bilaterally diminished at bases  No  wheezes, crackles or rhonchi   Cardiovascular: Regular, S1 and S2 nl, No murmurs, rubs, gallops or clicks  Gastrointestinal: Bowel Sounds present, soft, nontender.   Lymph: No lymphadenopathy. No peripheral edema.  Skin: No visible rashes or ulcers.  Psych:  Mood & affect appropriate    LABS: All Labs Reviewed:                        11.7   5.85  )-----------( 211      ( 07 Jan 2019 07:09 )             38.0                         13.6   12.35 )-----------( 262      ( 06 Jan 2019 09:36 )             44.4     07 Jan 2019 07:09    139    |  99     |  16     ----------------------------<  233    4.9     |  34     |  1.00   06 Jan 2019 09:36    138    |  99     |  15     ----------------------------<  308    5.0     |  33     |  1.20     Ca    9.2        07 Jan 2019 07:09  Ca    9.0        06 Jan 2019 09:36  Phos  2.9       07 Jan 2019 07:09  Mg     1.7       06 Jan 2019 09:36    TPro  6.3    /  Alb  3.0    /  TBili  0.3    /  DBili  .10    /  AST  11     /  ALT  31     /  AlkPhos  80     07 Jan 2019 07:09  TPro  7.1    /  Alb  3.6    /  TBili  0.4    /  DBili  x      /  AST  21     /  ALT  41     /  AlkPhos  100    06 Jan 2019 09:36    PT/INR - ( 07 Jan 2019 07:09 )   PT: 11.6 sec;   INR: 1.02 ratio         PTT - ( 06 Jan 2019 09:36 )  PTT:31.3 sec  CARDIAC MARKERS ( 07 Jan 2019 03:26 )  <.015 ng/mL / x     / 55 U/L / x     / x      CARDIAC MARKERS ( 06 Jan 2019 20:16 )  .026 ng/mL / x     / 69 U/L / x     / x      CARDIAC MARKERS ( 06 Jan 2019 09:36 )  <.015 ng/mL / x     / x     / x     / 5.0 ng/mL         ECG:  < from: 12 Lead ECG (01.06.19 @ 10:05) >  Ventricular Rate 116 BPM    Atrial Rate 116 BPM    P-R Interval 142 ms    QRS Duration 74 ms    Q-T Interval 318 ms    QTC Calculation(Bezet) 442 ms    P Axis 74 degrees    R Axis 90 degrees    T Axis 60 degrees    Diagnosis Line Sinus tachycardia  Rightward axis  Borderline ECG    Confirmed by Tito Taylor ([id number]) on 1/7/2019 11:06:15 AM    < end of copied text >    Echo:  < from: TTE Echo Doppler w/o Cont (10.26.18 @ 19:52) >  EXAM:  ECHO TTE W/O CON COMP W/DOPPLR         PROCEDURE DATE:  10/26/2018        INTERPRETATION:  Ordering Physician: RICKEY TRUJILLO    Indication: Cardiac arrest    Technician: BALDOMERO    Study Quality: Poor given that the patient was supine in the ICU   A complete echocardiographic study was performed utilizing standard   protocol including spectral and color Doppler in all echocardiographic   windows.    Height: 180 cm  Weight: 99 kg  Blood Pressure: 54/74    MEASUREMENTS  IVS: 0.99 cm  PWT: 0.78cm  LA: 3.4cm  AO: 2.9cm  LVIDd: Unable to measured given poor endocardial border definition  LVIDs: Unable to measure given poor endocardial border definition      LVEF: Visually estimated in the LV short axis view (best images of the   LV) is 55-60 %  RVSP: Unable to assess given lack of adequate TR waveform  RA Pressure: 15 mmHg  IVC: Dilated at 2.4 cm in diameter and collapses less than 50% with   inspiration (not valid if patient is intubated and on mechanical   ventilatory support)    FINDINGS  Left Ventricle: Over the left ventricle is poorly visualized. There was   best visualized in the LV short axis view. The visual estimation of the   ejection fraction is 55-60%. I'm unable to rule out regional wall motion   abnormalities given the poor acoustic windows.  Right Ventricle: Overall poorly visualized  Left Atrium: Grossly normal in size  Right Atrium: Overall poorly visualized  Mitral Valve: Normal in structure with trace mitral valve regurgitation  Aortic Valve: Trileaflet and sclerotic with no evidence of significant   insufficiency. No stenosis  Tricuspid Valve: Overall poorly visualized. There was trace tricuspid   valve regurgitation  Pulmonic Valve: Poorly visualized and poor Doppler interrogation  Diastolic Function: The LV diastolic function is indeterminate in this   study  Pericardium/Pleura: No significant pericardial or pleural effusions noted      CONCLUSIONS:  1. Normal technically limited study.  2. The left ventricle was best visualized in the shortaxis view. The LV   systolic function in that view appears preserved with a visually   estimated ejection fraction of 55-60%.  3. I'm unable to rule out regional wall motion abnormalities given the   poor endomyocardial border definition.  4. Normal mitral valve with trace regurgitation.  5. Sclerotic trileaflet aortic valve with no insufficiency or stenosis.  6. Poorly visualized right-sided chambers.  7. Limited study to assess pulmonary artery systolic pressure.                  TITO TAYLOR   This document has been electronically signed. Oct 27 2018 11:31AM        < end of copied text >    Radiology:  < from: US Duplex Venous Lower Ext Complete, Bilateral (01.06.19 @ 12:45) >  EXAM:  US DPLX LWR EXT VEINS COMPL BI                            PROCEDURE DATE:  01/06/2019          INTERPRETATION:  CLINICAL INFORMATION: dyspnea    COMPARISON: 12/22/2018.    TECHNIQUE: Duplex sonography of the BILATERAL LOWER extremities with   color and spectral Doppler, with and without compression.      FINDINGS:    There is normal compressibility of the bilateral common femoral, femoral   and popliteal veins. No calf vein thrombosis is detected.    Doppler examination shows normal spontaneous and phasic flow.    IMPRESSION:     No evidence of bilateral lower extremity deep venous thrombosis.                    RODRIGO FAITH M.D., ATTENDING RADIOLOGIST  This document has been electronically signed. Jan 6 2019 12:51PM    < end of copied text >  < from: Xray Chest 1 View- PORTABLE-Urgent (01.06.19 @ 09:37) >  EXAM:  XR CHEST PORTABLE URGENT 1V                            PROCEDURE DATE:  01/06/2019          INTERPRETATION:  Shortness of breath.    AP chest. Prior 12/21/2018.    Status post median sternotomy. No change heart mediastinum. Lungs are   emphysematous with increased lung volumes relatively oligemic upper lung   fields. Chronic accentuated bibasilar bronchovascular markings. No acute   infiltrate pneumothorax or pleural collection.    Impression: Uncomplicated COPD. No active infiltrates. Stable exam.                RODRIGO FAITH M.D., ATTENDING RADIOLOGIST  This document has been electronically signed. Jan 6 2019  9:46AM        < end of copied text >           Austin Salinas ANP   Cardiology

## 2019-01-07 NOTE — PROGRESS NOTE ADULT - ASSESSMENT
77 yo male with PMH of HTN, DM2 (occasional insulin use when on steroids), asthma, COPD (2L home/BiPAP at night), chronic hypoxemic resp failure, CABG, HLD, hypercapnic resp failure c/b with cardiac arrest, presented to ED with SOB and was admitted for acute hypoxic hypercapnic respiratory failure 2/2 COPD exacerbation likely 2/2 Coronavirus. 77 yo male with PMH of HTN, DM2 (occasional insulin use when on steroids), asthma, COPD (2L home/BiPAP at night), chronic hypoxemic and hypercapnic resp failure, CABG, HLD, hx of cardiac arrest, presented to ED with SOB and was admitted for acute on chronic hypoxic and hypercapnic respiratory failure 2/2 COPD exacerbation likely 2/2 Coronavirus infection.

## 2019-01-07 NOTE — PROGRESS NOTE ADULT - ASSESSMENT
79 yo M with Hx asthma, COPD with frequent admissions to this and other hospitals, chronic hypoxemic respiratory failure, CAD with 3v CABG 2004, without any other more recent cardiac issue.  His wife reports a normal stress test in 8/2018.  He has peripheral vascular disease status post stents years ago, which have been found patent by us.  He was here in October with resp failure, complicated by a bradycardic/PEA arrest. Echo during that hospitalization revealed a normal lvef without significant valve disease. S/P acute hypercarbic resp failure, with a pH of 7.15.   After being off bipap at home overnightS/P bipap      -there is no evidence of acute ischemia.  -known cad, s/p remote cabg and pad  -cont DAPT, BB, statin  - Trop neg x 3    -there is no evidence of significant arrhythmia.  -is at risk of af, and would monitor tele    -there is no evidence for meaningful  volume overload.    -bp controlled at present, cont valsartan    -DVT prophylaxis  -Monitor and replete lytes, keep K>4 and Mg >2  - All other workup per primary team  Thank you for the consult  Will continue to follow  Austin Salinas ANP  Cardiology

## 2019-01-07 NOTE — DISCHARGE NOTE ADULT - SECONDARY DIAGNOSIS.
COPD exacerbation CAD (coronary artery disease) Dyslipidemia Essential hypertension Diabetes Mellitus, Type II

## 2019-01-07 NOTE — DISCHARGE NOTE ADULT - PATIENT PORTAL LINK FT
You can access the [facility] Patient Portal, offered by [facility], by registering with the following website: [email or web address]

## 2019-01-07 NOTE — DISCHARGE NOTE ADULT - HOSPITAL COURSE
Patient is a 79 yo male with pmhx of htn, DM2 (occasional insulin use when on steroids), asthma, COPD (2L home/ BIPAP at night), chronic hypoxemic resp failure, CABG, HLD, hypercapnic resp failure c/b with cardiac arrest, presented to ED with SOB. Per patient and wife, started experiencing mild SOB yesterday night around 9pm, wife stated that patient complained of feeling more tired than usual, increased his O2 to 3L, and gave him a nebulizer treatment. Patient was supposed to be on bipap while sleeping at night, however, per wife, she woke up around 4am and found patient took off his bipap and was on NC. Patient complained of worsening SOB this AM, however, was able to get dressed and get into the car independently, however, wife stated that patient was unable to get out of the car when they arrived at the hospital. Per wife, patient is noncompliant with his bipap at night because he states that it is very uncomfortable. Patient also stated that for the past 3 days, he has increased his ventolin inhaler use, and yesterday he required ventolin 5x. Patient also reports left ear pain for the past 3 days with no redness or swelling and persistent dry cough. Patient was recently hospitalized at [facility] in 12/2018 for acute on chronic resp failure with hypoxia and hypercapnea. Was d/c home on PO steroids with last dose on 12/29/18. Per patient and wife, ever since discharge on 12/24/18 from [facility], patient has been more tired and has significant decrease in physical activity. Patient stated that he saw Dr. Dejesus recently and was told to restart daliresp. Patient had grandchildren visiting last night who was recovering from a cold, however, denies any other recent sick contacts. Denied any fever, dizziness, visual changes, chest pain, palpitations, abdominal pain, nausea, vomiting, diarrhea, dysuria, hematuria, or melena.     In the ED, Initially saturating 50% on room air, tachycardic 135bpm  most recent vitals: afebrile, 116bpm, 131/70, 94% on bipap  CBC showed mild leukocytosis 12.35 CMP: CO2 33, glucose 308  Ddimer 167  most recent abg: pH 7.24, CO2 71, O2 77, HCO3 25, base 2.4, O2 sat 93  Flu A/B/RSV negative  CXR consistent with COPD, otherwise unremarkable for any other acute findings, doppler negative for LE DVT  EKG showed sinus tachy 116bpm, otherwise no significant changes when compared to previous ekg  Received 1x NS bolus, 2x duoneb, 1x mag sulfate, 1x solumedrol in ED  Admitted for acute hypoxic hypercapnic respiratory failure 2/2 COPD exacerbation with underlying coronavirus. Seen by pulmonologist, Dr. Dejesus, recommended PM BIPAP use. Serial ABG showed hypercapnic acidemia improved during admission while on BIPAP. Given solumedrol, duonebs and pulmicort. Evaluated by Dr. Capps, cardiologist, for recent cardiac arrest/PEA while admitted in October for respiratory failure. Troponins negative and patient continued on home beta blocker, statin and aspirin. 79 yo male with PMH of HTN, DM2 (occasional insulin use when on steroids), asthma, COPD (2L home/BiPAP at night), chronic hypoxemic and hypercapnic resp failure, CABG, HLD, hx of cardiac arrest, presented to ED with shortness of breath, admitted for acute on chronic hypoxic and hypercapnic respiratory failure 2/2 COPD exacerbation likely 2/2 Coronavirus infection. Seen by pulmonologist, Dr. Dejesus, recommended PM BIPAP use. Serial ABG showed hypercapnic acidemia improved during admission while on BIPAP. Given solumedrol, duonebs and pulmicort. Evaluated by Dr. Capps, cardiologist, for recent cardiac arrest/PEA while admitted in October for respiratory failure. Troponins were negative and patient continued on home beta blocker, statin and aspirin. Steroids were tapered.    Patient responded well to medical treatment during hospitalization, breathing comfortably on home level of O2 by NC and with BiPAP at night. Patient was seen and examined on the day of discharge and is medically optimized for discharge, with close outpatient follow up. 79 yo male with PMH of HTN, DM2 (occasional insulin use when on steroids), asthma, COPD (2L home/BiPAP at night), chronic hypoxemic and hypercapnic resp failure, CABG, HLD, hx of cardiac arrest, presented to ED with shortness of breath, admitted for acute on chronic hypoxic and hypercapnic respiratory failure 2/2 COPD exacerbation likely 2/2 Coronavirus infection. Seen by pulmonologist, Dr. Dejesus, recommended PM BIPAP use. Serial ABG showed hypercapnic acidemia improved during admission while on BIPAP. Given solumedrol, duonebs and pulmicort. Evaluated by Dr. Capps, cardiologist, for recent cardiac arrest/PEA while admitted in October for respiratory failure. Troponins were negative and patient continued on home beta blocker, statin and aspirin. Steroids were tapered. Patient responded well to medical treatment during hospitalization, breathing comfortably on home level of O2 by NC and with BiPAP at night.     Patient was seen and examined on the day of discharge, denied any CP, SOB, abdominal pain, or other complaints. Patient is medically optimized for discharge, with close outpatient follow up.    PHYSICAL EXAM:  GENERAL: NAD, on nasal cannula  HEENT: EOMI, moist mucous membranes  CHEST/LUNG: Decreased breath sounds, no wheezing  HEART: RRR, S1, S2  ABDOMEN: BS+, soft, nontender, nondistended  EXTREMITIES: No edema, cyanosis, or calf tenderness  NERVOUS SYSTEM: AA&Ox3    Vital Signs Last 24 Hrs  T(C): 36.4 (09 Jan 2019 12:07), Max: 36.9 (08 Jan 2019 15:32)  T(F): 97.6 (09 Jan 2019 12:07), Max: 98.4 (08 Jan 2019 15:32)  HR: 84 (09 Jan 2019 14:49) (61 - 95)  BP: 116/74 (09 Jan 2019 12:07) (112/71 - 122/72)  BP(mean): --  RR: 20 (09 Jan 2019 12:07) (16 - 21)  SpO2: 97% (09 Jan 2019 14:49) (94% - 100%) 77 yo male with PMH of HTN, DM2 (occasional insulin use when on steroids), asthma, COPD (2L home/BiPAP at night), chronic hypoxemic and hypercapnic resp failure, CABG, HLD, hx of cardiac arrest, presented to ED with shortness of breath, admitted for acute on chronic hypoxic and hypercapnic respiratory failure 2/2 COPD exacerbation likely 2/2 Coronavirus infection. Seen by pulmonologist, Dr. Dejesus, recommended PM BIPAP use. Serial ABG showed hypercapnic acidemia improved during admission while on BIPAP. Given solumedrol, duonebs and pulmicort. Evaluated by Dr. Capps, cardiologist, for recent cardiac arrest/PEA while admitted in October for respiratory failure. Troponins were negative and patient continued on home beta blocker, statin and aspirin. Steroids were tapered. Patient responded well to medical treatment during hospitalization, breathing comfortably on home level of O2 by NC and with BiPAP at night.     Patient was seen and examined on the day of discharge, denied any CP, SOB, abdominal pain, or other complaints. Patient is medically optimized for discharge, with close outpatient follow up.    PHYSICAL EXAM:  GENERAL: NAD, on nasal cannula  HEENT: EOMI, moist mucous membranes  CHEST/LUNG: Decreased breath sounds, no wheezing  HEART: RRR, S1, S2  ABDOMEN: BS+, soft, nontender, nondistended  EXTREMITIES: No edema, cyanosis, or calf tenderness  NERVOUS SYSTEM: AA&Ox3    Vital Signs Last 24 Hrs  T(C): 36.4 (09 Jan 2019 12:07), Max: 36.9 (08 Jan 2019 15:32)  T(F): 97.6 (09 Jan 2019 12:07), Max: 98.4 (08 Jan 2019 15:32)  HR: 84 (09 Jan 2019 14:49) (61 - 95)  BP: 116/74 (09 Jan 2019 12:07) (112/71 - 122/72)  BP(mean): --  RR: 20 (09 Jan 2019 12:07) (16 - 21)  SpO2: 97% (09 Jan 2019 14:49) (94% - 100%)    Time spent: 45min

## 2019-01-07 NOTE — DISCHARGE NOTE ADULT - ADDITIONAL INSTRUCTIONS
Full range of motion of upper and lower extremities, no joint tenderness/swelling.
A1C 6.4 on January 7,2019

## 2019-01-07 NOTE — PROGRESS NOTE ADULT - PROBLEM SELECTOR PLAN 2
Stable  - Continue home meds metoprolol and valsartan with hold parameters. - Continue home meds metoprolol and valsartan with hold parameters.

## 2019-01-07 NOTE — DISCHARGE NOTE ADULT - MEDICATION SUMMARY - MEDICATIONS TO TAKE
I will START or STAY ON the medications listed below when I get home from the hospital:    predniSONE 20 mg oral tablet  -- 1 tab(s) by mouth 2 times a day for 4 more days starting tomorrow morning 1/10/19  -- It is very important that you take or use this exactly as directed.  Do not skip doses or discontinue unless directed by your doctor.  Obtain medical advice before taking any non-prescription drugs as some may affect the action of this medication.  Take with food or milk.    -- Indication: For COPD exacerbation    aspirin 81 mg oral tablet  -- 1 tab(s) by mouth once a day  -- Indication: For CAD (coronary artery disease)    valsartan 80 mg oral tablet  -- 1 tab(s) by mouth once a day  -- Indication: For Hypertension    tamsulosin 0.4 mg oral capsule  -- 1 cap(s) by mouth once a day (at bedtime)  -- Indication: For BPH    metFORMIN 1000 mg oral tablet  -- 1 tab(s) by mouth 2 times a day  -- Indication: For Diabetes Mellitus, Type II    glipiZIDE 2.5 mg oral tablet, extended release  -- 1 tab(s) by mouth 2 times a day  -- Indication: For Diabetes Mellitus, Type II    atorvastatin 40 mg oral tablet  -- 1 tab(s) by mouth once a day (at bedtime)  -- Indication: For CAD (coronary artery disease)    clopidogrel 75 mg oral tablet  -- 1 tab(s) by mouth once a day  -- Indication: For CAD (coronary artery disease)    metoprolol tartrate 25 mg oral tablet  -- 0.5 tab(s) by mouth 2 times a day  -- Indication: For Hypertension    Incruse Ellipta 62.5 mcg/inh inhalation powder  -- 1 puff(s) inhaled once a day  -- Indication: For COPD      Breo Ellipta 200 mcg-25 mcg/inh inhalation powder  -- 1 puff(s) inhaled once a day  -- Indication: For COPD      Ventolin HFA 90 mcg/inh inhalation aerosol  -- 2 puff(s) inhaled 4 times a day, As Needed  -- Indication: For COPD      Symbicort 160 mcg-4.5 mcg/inh inhalation aerosol  -- 2 puff(s) inhaled 2 times a day  -- Indication: For COPD      azithromycin 500 mg oral tablet  -- 1 tab(s) by mouth one more tablet tomorrow evening 1/10/19 around 4PM.   -- Indication: For COPD exacerbation    pantoprazole 40 mg oral delayed release tablet  -- 1 tab(s) by mouth once a day (before a meal) for 5 more days while on steroids  -- Indication: For protect stomach while on steroids    Daliresp 500 mcg oral tablet  -- 1 tab(s) by mouth once a day  -- Indication: For COPD (chronic obstructive pulmonary disease)    Multiple Vitamins oral tablet  -- 1 tab(s) by mouth once a day  -- Indication: For home vitamin    Vitamin C  -- 1 tab(s) by mouth once a day  -- Indication: For home vitamin

## 2019-01-07 NOTE — PROGRESS NOTE ADULT - SUBJECTIVE AND OBJECTIVE BOX
[facility]    ALLERGY Shellfish  CONTACT Sp Amira EATON    REASON FOR VISIT Subseq pul fu   Initial evaluation/Pulmonary consultation requested  1/6/2019 from Dr Dejesus by Dr Alston    Patient examined chart reviewed  HOSPITAL ADMISSION [facility] Hospitalist 1/6/2019   PATIENT CAME  FROM (if information available)      VITALS/LABS                           1/7/2019 afeb 81 107/69 17 100%   1/7/2019 1a bpap 15/5/.25   1/7/2019 w 5.8 Hb 11.7 Plt 211  Na 139 K 4.9 CO2 34 Cr 1     REVIEW OF SYMPTOMS    Able to give ROS  Yes     RELIABLE No   CONSTITUTIONAL Weakness Yes  Chills No Vision changes No  ENDOCRINE No unexplained hair loss No heat or cold intolerance    ALLERGY No hives  Sore throat No   RESP Coughing blood no  Shortness of breath YES   NEURO No Headache  Confusion Pain neck No   CARDIAC No Chest pain No Palpitations   GI No Pain abdomen NO   Vomiting NO     PHYSICAL EXAM    HEENT Unremarkable PERRLA atraumatic   RESP Fair air entry EXP prolonged    Harsh breath sound Resp distres mild   CARDIAC S1 S2 No S3     NO JVD    ABDOMEN SOFT BS PRESENT NOT DISTENDED No hepatosplenomegaly PEDAL EDEMA present No calf tenderness  NO rash   GENERAL Not TOXIC looking    PATIENT DESCRIPTION PATIENT [facility] ;  ADMISSION [facility] HOSPITALIST   77 y/o male pmhx HTN, PVD, DM, Asthma, COPD on 2L home O2, hx CABGx3, HLD presented to ED 1/6/2019  w/ SOB and increased work of breathing.     MEDS     PROBLEM SPECIFIC PATIENT DATA PATIENT [facility] 1/6/2019   ADMISSION [facility] HOSPITALIST    ACUTE COMBINED HYPOXIC HYPERCAPNIC RESP FAILURE   1/7/2019 .25/15/5 739/55/94   1/7/2019 Patient is in compensated resp acidosis and will continue to manage with prn bpap  RESP TRACT INFECTION  1/6-1/7/2019 W 12.3 - 5.8  1/6/2019 CXR no infiltrates   1/6 coronavirus   Azithr (1/6)   A/R Patient likely had copd  sec to coronavirus Check pc bc sero   COPD ex   Duoneb.6 (1/6)   Pulmicort (1/6)   solumed 40.3   A/R BD Steroids Improving Taper staroids in am   DM   A/R Sl scale   RO MI   1/6/2019 Tr 1 n  ASA 81 (1/6)   atorvastat 40 (1/6)   metoprolol 12.5x2 (1/6)   plavx 75 (1/6)   A/R No ongoing ischemia   CHF  10/26/2018 ECHO ef 55%   12/20/2017 ECHO  n lvsf diast dysfn ef 55%   Valsartan 80   A/R compensated     TIME SPENT Over 25 minutes aggregate care time spent on encounter; activities included   direct patient care, counseling and/or coordinating care reviewing notes, lab data/ imaging , discussion with multidisciplinary team/ patient  /family. Risks, benefits, alternatives  discussed in detail.

## 2019-01-07 NOTE — PROGRESS NOTE ADULT - PROBLEM SELECTOR PLAN 4
HbA1c 6.3%, elevated glucose likely 2/2 steroids  - Continue LINDA, accuchecks, hypoglycemia protocol  - DASH/consistent carb diet

## 2019-01-07 NOTE — DISCHARGE NOTE ADULT - CARE PLAN
Principal Discharge DX:	Acute on chronic respiratory failure with hypoxia and hypercapnia  Secondary Diagnosis:	COPD exacerbation  Secondary Diagnosis:	CAD (coronary artery disease)  Secondary Diagnosis:	Dyslipidemia  Secondary Diagnosis:	Essential hypertension  Secondary Diagnosis:	Diabetes Mellitus, Type II Principal Discharge DX:	Acute on chronic respiratory failure with hypoxia and hypercapnia  Goal:	Improvement  Assessment and plan of treatment:	- Continue inhaler and Prednisone as prescribed.  - Follow up with PMD and pulmonologist as outpatient within 1 week of discharge for further management.  Secondary Diagnosis:	CAD (coronary artery disease)  Assessment and plan of treatment:	- Continue ASA and Plavix.  - Follow up with PMD as outpatient within 1 week of discharge for further management.  Secondary Diagnosis:	Essential hypertension  Assessment and plan of treatment:	- Continue home meds metoprolol and valsartan.  - Follow up with PMD as outpatient within 1 week of discharge for further management.  Secondary Diagnosis:	Diabetes Mellitus, Type II  Assessment and plan of treatment:	- Continue your home diabetic medications.  - Follow up with PMD as outpatient within 1 week of discharge for further management. Principal Discharge DX:	Acute on chronic respiratory failure with hypoxia and hypercapnia  Goal:	Improvement  Assessment and plan of treatment:	- Use your albuterol inhaler 4 times per day for the next 3-4 days and take the prednisone as prescribed for 4 more days starting tomorrow 1/10/19.  - Follow up with PMD and pulmonologist as outpatient within 1 week of discharge for further management.  Secondary Diagnosis:	CAD (coronary artery disease)  Assessment and plan of treatment:	- Continue aspirin and Plavix and lipitor  - Follow up with cardiologist as outpatient within 1 week of discharge for further management.  Secondary Diagnosis:	Essential hypertension  Assessment and plan of treatment:	- Continue home meds metoprolol and valsartan.  - Follow up with PMD as outpatient within 1 week of discharge for further management.  Secondary Diagnosis:	Diabetes Mellitus, Type II  Assessment and plan of treatment:	- Continue your home diabetic medication. Your hemoglobin A1c is 6.4% which shows you have good diabetic control.   - Follow up with PMD as outpatient within 1 week of discharge for further management.

## 2019-01-07 NOTE — PROGRESS NOTE ADULT - PROBLEM SELECTOR PLAN 1
COPD exacerbation likely 2/2 Coronavirus (positive rapid RVP), patient also noncompliant with nightly CPAP use.  - Latest ABG showed further improvement.  - Continue supplemental O2 during the day/BiPAP at night.  - Per pulm (Dr. Dejesus), continue solu-medrol 40 TID, Duoneb Q4H, Pulmicort BID, and Zithromax.  - No evidence of DVT on dopplers, D-Dimer wnl, troponin negative x3, TTE in 10/2018 showed normal LVEF with no significant valvular disease.  - f/u BCx, Legionella, Strep urine antigen. -acute on chronic hypoxic and hypercapnic respiratory failure 2/2 COPD exacerbation likely 2/2 Coronavirus (positive rapid RVP), patient also noncompliant with nightly CPAP use.  - Latest ABG showed further improvement to what is likely near pt's baseline.  - Continue supplemental O2 during the day/BiPAP at night.  - Per pulm (Dr. Dejesus), continue solu-medrol 40 TID, Duoneb Q4H, Pulmicort BID, and Zithromax.  - No evidence of DVT on dopplers, D-Dimer wnl, troponin negative x3, TTE in 10/2018 showed normal LVEF with no significant valvular disease.  - f/u BCx, Legionella, Strep urine antigen.

## 2019-01-08 LAB
ANION GAP SERPL CALC-SCNC: 3 MMOL/L — LOW (ref 5–17)
BUN SERPL-MCNC: 18 MG/DL — SIGNIFICANT CHANGE UP (ref 7–23)
CALCIUM SERPL-MCNC: 9.1 MG/DL — SIGNIFICANT CHANGE UP (ref 8.5–10.1)
CHLORIDE SERPL-SCNC: 99 MMOL/L — SIGNIFICANT CHANGE UP (ref 96–108)
CO2 SERPL-SCNC: 37 MMOL/L — HIGH (ref 22–31)
CREAT SERPL-MCNC: 1.1 MG/DL — SIGNIFICANT CHANGE UP (ref 0.5–1.3)
CULTURE RESULTS: SIGNIFICANT CHANGE UP
GLUCOSE SERPL-MCNC: 213 MG/DL — HIGH (ref 70–99)
HCT VFR BLD CALC: 39.1 % — SIGNIFICANT CHANGE UP (ref 39–50)
HGB BLD-MCNC: 12.1 G/DL — LOW (ref 13–17)
MCHC RBC-ENTMCNC: 27.9 PG — SIGNIFICANT CHANGE UP (ref 27–34)
MCHC RBC-ENTMCNC: 30.9 GM/DL — LOW (ref 32–36)
MCV RBC AUTO: 90.1 FL — SIGNIFICANT CHANGE UP (ref 80–100)
NRBC # BLD: 0 /100 WBCS — SIGNIFICANT CHANGE UP (ref 0–0)
PLATELET # BLD AUTO: 240 K/UL — SIGNIFICANT CHANGE UP (ref 150–400)
POTASSIUM SERPL-MCNC: 4.6 MMOL/L — SIGNIFICANT CHANGE UP (ref 3.5–5.3)
POTASSIUM SERPL-SCNC: 4.6 MMOL/L — SIGNIFICANT CHANGE UP (ref 3.5–5.3)
RBC # BLD: 4.34 M/UL — SIGNIFICANT CHANGE UP (ref 4.2–5.8)
RBC # FLD: 12.5 % — SIGNIFICANT CHANGE UP (ref 10.3–14.5)
SODIUM SERPL-SCNC: 139 MMOL/L — SIGNIFICANT CHANGE UP (ref 135–145)
SPECIMEN SOURCE: SIGNIFICANT CHANGE UP
WBC # BLD: 8.93 K/UL — SIGNIFICANT CHANGE UP (ref 3.8–10.5)
WBC # FLD AUTO: 8.93 K/UL — SIGNIFICANT CHANGE UP (ref 3.8–10.5)

## 2019-01-08 PROCEDURE — 99232 SBSQ HOSP IP/OBS MODERATE 35: CPT

## 2019-01-08 PROCEDURE — 99233 SBSQ HOSP IP/OBS HIGH 50: CPT | Mod: GC

## 2019-01-08 RX ORDER — INSULIN LISPRO 100/ML
5 VIAL (ML) SUBCUTANEOUS
Qty: 0 | Refills: 0 | Status: DISCONTINUED | OUTPATIENT
Start: 2019-01-08 | End: 2019-01-09

## 2019-01-08 RX ORDER — INSULIN GLARGINE 100 [IU]/ML
15 INJECTION, SOLUTION SUBCUTANEOUS AT BEDTIME
Qty: 0 | Refills: 0 | Status: DISCONTINUED | OUTPATIENT
Start: 2019-01-08 | End: 2019-01-09

## 2019-01-08 RX ADMIN — Medication 40 MILLIGRAM(S): at 05:45

## 2019-01-08 RX ADMIN — Medication 3 MILLILITER(S): at 20:07

## 2019-01-08 RX ADMIN — INSULIN GLARGINE 15 UNIT(S): 100 INJECTION, SOLUTION SUBCUTANEOUS at 22:55

## 2019-01-08 RX ADMIN — Medication 0.5 MILLIGRAM(S): at 08:16

## 2019-01-08 RX ADMIN — Medication 5: at 17:28

## 2019-01-08 RX ADMIN — TAMSULOSIN HYDROCHLORIDE 0.4 MILLIGRAM(S): 0.4 CAPSULE ORAL at 21:09

## 2019-01-08 RX ADMIN — HEPARIN SODIUM 5000 UNIT(S): 5000 INJECTION INTRAVENOUS; SUBCUTANEOUS at 17:28

## 2019-01-08 RX ADMIN — Medication 20 MILLIGRAM(S): at 21:08

## 2019-01-08 RX ADMIN — Medication 0.5 MILLIGRAM(S): at 20:07

## 2019-01-08 RX ADMIN — Medication 3 MILLILITER(S): at 11:41

## 2019-01-08 RX ADMIN — Medication 1 TABLET(S): at 12:15

## 2019-01-08 RX ADMIN — ATORVASTATIN CALCIUM 40 MILLIGRAM(S): 80 TABLET, FILM COATED ORAL at 21:08

## 2019-01-08 RX ADMIN — Medication 2: at 22:55

## 2019-01-08 RX ADMIN — Medication 3 MILLILITER(S): at 08:16

## 2019-01-08 RX ADMIN — Medication 3 MILLILITER(S): at 23:34

## 2019-01-08 RX ADMIN — PANTOPRAZOLE SODIUM 40 MILLIGRAM(S): 20 TABLET, DELAYED RELEASE ORAL at 05:46

## 2019-01-08 RX ADMIN — CLOPIDOGREL BISULFATE 75 MILLIGRAM(S): 75 TABLET, FILM COATED ORAL at 12:15

## 2019-01-08 RX ADMIN — Medication 12.5 MILLIGRAM(S): at 17:28

## 2019-01-08 RX ADMIN — AZITHROMYCIN 500 MILLIGRAM(S): 500 TABLET, FILM COATED ORAL at 17:28

## 2019-01-08 RX ADMIN — Medication 5: at 12:15

## 2019-01-08 RX ADMIN — Medication 81 MILLIGRAM(S): at 12:15

## 2019-01-08 RX ADMIN — Medication 3 MILLILITER(S): at 16:01

## 2019-01-08 RX ADMIN — HEPARIN SODIUM 5000 UNIT(S): 5000 INJECTION INTRAVENOUS; SUBCUTANEOUS at 05:45

## 2019-01-08 RX ADMIN — Medication 5 UNIT(S): at 17:28

## 2019-01-08 RX ADMIN — Medication 20 MILLIGRAM(S): at 13:24

## 2019-01-08 RX ADMIN — Medication 2: at 08:19

## 2019-01-08 NOTE — PROGRESS NOTE ADULT - SUBJECTIVE AND OBJECTIVE BOX
Patient was examined Chart reviewed Detailed note to follow later today after reviewing latest data Meanwhile  please refer to my previous note for Pulmonary recommendations Patient was examined Chart reviewed Detailed note to follow later today after reviewing latest data Meanwhile  please refer to my previous note for Pulmonary recommendations        [facility]    ALLERGY Shellfish  CONTACT Sp Amira EATON    REASON FOR VISIT Subseq pul fu   Initial evaluation/Pulmonary consultation requested  1/6/2019 from Dr Dejesus by Dr Alston    Patient examined chart reviewed  HOSPITAL ADMISSION [facility] Hospitalist 1/6/2019   PATIENT CAME  FROM (if information available)      VITALS/LABS                           1/8/2019 afeb 85 118/80 16 100%   1/8/2019 w 8.9 Hb 12.1 Plt 240 Na 139 K 4.6 CO2 37 Cr 1.1    GLOBAL ISSUE/BEST PRACTICE:      PROBLEM: HOB elevation:   y            PROBLEM: Stress ulcer proph:    protonix 40 (1/6)               PROBLEM: VTE prophylaxis:      Hepar (1/6)   PROBLEM: Glycemic control:      PROBLEM: Nutrition:  cons carb (1/6)   PROBLEM: Advanced directive: na     PROBLEM: Allergies:  shellfish    REVIEW OF SYMPTOMS    Able to give ROS  Yes     RELIABLE No   CONSTITUTIONAL Weakness Yes  Chills No Vision changes No  ENDOCRINE No unexplained hair loss No heat or cold intolerance    ALLERGY No hives  Sore throat No   RESP Coughing blood no  Shortness of breath YES   NEURO No Headache  Confusion Pain neck No   CARDIAC No Chest pain No Palpitations   GI No Pain abdomen NO   Vomiting NO     PHYSICAL EXAM    HEENT Unremarkable PERRLA atraumatic   RESP Fair air entry EXP prolonged    Harsh breath sound Resp distres mild   CARDIAC S1 S2 No S3     NO JVD    ABDOMEN SOFT BS PRESENT NOT DISTENDED No hepatosplenomegaly PEDAL EDEMA present No calf tenderness  NO rash   GENERAL Not TOXIC looking    PATIENT DESCRIPTION PATIENT [facility] ;  ADMISSION [facility] HOSPITALIST   79 y/o male pmhx HTN, PVD, DM, Asthma, COPD on 2L home O2, hx CABGx3, HLD presented to ED 1/6/2019  w/ SOB and increased work of breathing.     MEDS   12/16/2018  Breo 200   Incruse   Daliresp  Glipizide 2.5x2   metformin 1000.2   Lopressor 12.5x2   Valsart 80    Tamsulosin .4   Atorvastat 40   Plavix 75     PROBLEM SPECIFIC PATIENT DATA PATIENT [facility] 1/6/2019   ADMISSION [facility] HOSPITALIST    ACUTE COMBINED HYPOXIC HYPERCAPNIC RESP FAILURE   1/7/2019 .25/15/5 739/55/94   1/7/2019 Patient is in compensated resp acidosis and will continue to manage with prn bpap  RESP TRACT INFECTION  1/6-1/7/2019 W 12.3 - 5.8  1/6/2019 CXR no infiltrates   1/6 coronavirus   Azithr (1/6)   A/R Patient likely had copd  sec to coronavirus Check pc bc sero   COPD ex   Duoneb.6 (1/6)   Pulmicort (1/6)   solumed 40.3 (1/6) ch solu 20.3 (1/8)   A/R BD Steroids Improving Taper staroids in am   DM   A/R Sl scale   RO MI   1/6/2019 Tr 1 n  ASA 81 (1/6)   atorvastat 40 (1/6)   metoprolol 12.5x2 (1/6)   plavx 75 (1/6)   A/R No ongoing ischemia   CHF  10/26/2018 ECHO ef 55%   12/20/2017 ECHO  n lvsf diast dysfn ef 55%   Valsartan 80   A/R compensated     TIME SPENT Over 25 minutes aggregate care time spent on encounter; activities included   direct patient care, counseling and/or coordinating care reviewing notes, lab data/ imaging , discussion with multidisciplinary team/ patient  /family. Risks, benefits, alternatives  discussed in detail.

## 2019-01-08 NOTE — PROGRESS NOTE ADULT - PROBLEM SELECTOR PLAN 4
HbA1c 6.3%, elevated glucose likely 2/2 steroids  - Continue LINDA, accuchecks, hypoglycemia protocol  - DASH/consistent carb diet HbA1c 6.3%, elevated glucose likely 2/2 steroids  -holding pt's home metformin/glipizode  -started conservative weight based basal/bolus insulin today as FSG uncontrolled  - Continue LINDA, accuchecks, hypoglycemia protocol  - DASH/consistent carb diet

## 2019-01-08 NOTE — PROGRESS NOTE ADULT - ASSESSMENT
77 yo male with PMH of HTN, DM2 (occasional insulin use when on steroids), asthma, COPD (2L home/BiPAP at night), chronic hypoxemic and hypercapnic resp failure, CABG, HLD, hx of cardiac arrest, presented to ED with SOB and was admitted for acute on chronic hypoxic and hypercapnic respiratory failure 2/2 COPD exacerbation likely 2/2 Coronavirus infection. 79yo male with PMH of HTN, DM2 (occasional insulin use when on steroids), asthma, COPD (2L home/BiPAP at night), chronic hypoxemic and hypercapnic resp failure, CABG, HLD, hx of cardiac arrest, presented to ED with SOB and was admitted for acute on chronic hypoxic and hypercapnic respiratory failure 2/2 COPD exacerbation likely 2/2 Coronavirus infection.

## 2019-01-08 NOTE — PROGRESS NOTE ADULT - SUBJECTIVE AND OBJECTIVE BOX
[facility] -- Saud Aguilar, Génesis Louise Pannella, Patel, Savella  Office # [phone]    Follow Up:  Acute respiratory failure    Subjective/Observations: Seen awake and alert, sitting up.  Nasal cannula in use.  Claimed to have worn BIPAP overnight and felt better with it.  Denies CP or palpitatios    REVIEW OF SYSTEMS: All other review of systems is negative unless indicated above    PAST MEDICAL & SURGICAL HISTORY:  PVD (peripheral vascular disease)  Hypertension  COPD (chronic obstructive pulmonary disease)  Osteomyelitis  Dyslipidemia  CAD (Coronary Artery Disease)  Diabetes Mellitus, Type II  CABG (Coronary Artery Bypass Graft)    MEDICATIONS  (STANDING):  ALBUTerol/ipratropium for Nebulization 3 milliLiter(s) Nebulizer every 4 hours  aspirin enteric coated 81 milliGRAM(s) Oral daily  atorvastatin 40 milliGRAM(s) Oral at bedtime  azithromycin   Tablet 500 milliGRAM(s) Oral every 24 hours  buDESOnide   0.5 milliGRAM(s) Respule 0.5 milliGRAM(s) Inhalation two times a day  clopidogrel Tablet 75 milliGRAM(s) Oral daily  dextrose 5%. 1000 milliLiter(s) (50 mL/Hr) IV Continuous <Continuous>  dextrose 50% Injectable 12.5 Gram(s) IV Push once  dextrose 50% Injectable 25 Gram(s) IV Push once  dextrose 50% Injectable 25 Gram(s) IV Push once  heparin  Injectable 5000 Unit(s) SubCutaneous every 12 hours  insulin lispro (HumaLOG) corrective regimen sliding scale   SubCutaneous three times a day before meals  insulin lispro (HumaLOG) corrective regimen sliding scale   SubCutaneous at bedtime  methylPREDNISolone sodium succinate Injectable 20 milliGRAM(s) IV Push three times a day  metoprolol tartrate 12.5 milliGRAM(s) Oral two times a day  multivitamin 1 Tablet(s) Oral daily  pantoprazole    Tablet 40 milliGRAM(s) Oral before breakfast  tamsulosin 0.4 milliGRAM(s) Oral at bedtime  valsartan 80 milliGRAM(s) Oral daily    MEDICATIONS  (PRN):  dextrose 40% Gel 15 Gram(s) Oral once PRN Blood Glucose LESS THAN 70 milliGRAM(s)/deciliter  glucagon  Injectable 1 milliGRAM(s) IntraMuscular once PRN Glucose LESS THAN 70 milligrams/deciliter      Allergies    No Known Allergies    Intolerances    shellfish (Nausea)    Vital Signs Last 24 Hrs  T(C): 36.5 (08 Jan 2019 12:07), Max: 36.9 (07 Jan 2019 16:15)  T(F): 97.7 (08 Jan 2019 12:07), Max: 98.5 (07 Jan 2019 16:15)  HR: 88 (08 Jan 2019 12:07) (68 - 102)  BP: 119/79 (08 Jan 2019 12:07) (109/67 - 131/83)  BP(mean): --  RR: 16 (08 Jan 2019 12:07) (16 - 17)  SpO2: 98% (08 Jan 2019 12:07) (97% - 100%)    I&O's Summary    07 Jan 2019 07:01  -  08 Jan 2019 07:00  --------------------------------------------------------  IN: 1775 mL / OUT: 400 mL / NET: 1375 mL    PHYSICAL EXAM:  TELE: NSR  Constitutional: NAD, awake and alert, well-developed  HEENT: Moist Mucous Membranes, Anicteric  Pulmonary: Non-labored, breath sounds are markedly diminished bilaterally, No wheezing, rales or rhonchi  Cardiovascular: Regular, S1 and S2, No murmurs, rubs, gallops or clicks  Gastrointestinal: Bowel Sounds present, soft, nontender.   Lymph: No peripheral edema. No lymphadenopathy.  Skin: No visible rashes or ulcers.  Psych:  Mood & affect appropriate    LABS: All Labs Reviewed:                        12.1   8.93  )-----------( 240      ( 08 Jan 2019 07:20 )             39.1                         11.7   5.85  )-----------( 211      ( 07 Jan 2019 07:09 )             38.0                         13.6   12.35 )-----------( 262      ( 06 Jan 2019 09:36 )             44.4     08 Jan 2019 07:20    139    |  99     |  18     ----------------------------<  213    4.6     |  37     |  1.10   07 Jan 2019 07:09    139    |  99     |  16     ----------------------------<  233    4.9     |  34     |  1.00   06 Jan 2019 09:36    138    |  99     |  15     ----------------------------<  308    5.0     |  33     |  1.20     Ca    9.1        08 Jan 2019 07:20  Ca    9.2        07 Jan 2019 07:09  Ca    9.0        06 Jan 2019 09:36  Phos  2.9       07 Jan 2019 07:09  Mg     1.7       06 Jan 2019 09:36    TPro  6.3    /  Alb  3.0    /  TBili  0.3    /  DBili  .10    /  AST  11     /  ALT  31     /  AlkPhos  80     07 Jan 2019 07:09  TPro  7.1    /  Alb  3.6    /  TBili  0.4    /  DBili  x      /  AST  21     /  ALT  41     /  AlkPhos  100    06 Jan 2019 09:36    PT/INR - ( 07 Jan 2019 07:09 )   PT: 11.6 sec;   INR: 1.02 ratio    CARDIAC MARKERS ( 07 Jan 2019 03:26 )  <.015 ng/mL / x     / 55 U/L / x     / x      CARDIAC MARKERS ( 06 Jan 2019 20:16 )  .026 ng/mL / x     / 69 U/L / x     / x        < from: TTE Echo Doppler w/o Cont (10.26.18 @ 19:52) >     EXAM:  ECHO TTE W/O CON COMP W/DOPPLR         PROCEDURE DATE:  10/26/2018        INTERPRETATION:  Ordering Physician: RICKEY TRUJILLO    Indication: Cardiac arrest    Technician:     Study Quality: Poor given that the patient was supine in the ICU   A complete echocardiographic study was performed utilizing standard   protocol including spectral and color Doppler in all echocardiographic   windows.    Height: 180 cm  Weight: 99 kg  Blood Pressure: 54/74    MEASUREMENTS  IVS: 0.99 cm  PWT: 0.78cm  LA: 3.4cm  AO: 2.9cm  LVIDd: Unable to measured given poor endocardial border definition  LVIDs: Unable to measure given poor endocardial border definition      LVEF: Visually estimated in the LV short axis view (best images of the   LV) is 55-60 %  RVSP: Unable to assess given lack of adequate TR waveform  RA Pressure: 15 mmHg  IVC: Dilated at 2.4 cm in diameter and collapses less than 50% with   inspiration (not valid if patient is intubated and on mechanical   ventilatory support)    FINDINGS  Left Ventricle: Over the left ventricle is poorly visualized. There was   best visualized in the LV short axis view. The visual estimation of the   ejection fraction is 55-60%. I'm unable to rule out regional wall motion   abnormalities given the poor acoustic windows.  Right Ventricle: Overall poorly visualized  Left Atrium: Grossly normal in size  Right Atrium: Overall poorly visualized  Mitral Valve: Normal in structure with trace mitral valve regurgitation  Aortic Valve: Trileaflet and sclerotic with no evidence of significant   insufficiency. No stenosis  Tricuspid Valve: Overall poorly visualized. There was trace tricuspid   valve regurgitation  Pulmonic Valve: Poorly visualized and poor Doppler interrogation  Diastolic Function: The LV diastolic function is indeterminate in this   study  Pericardium/Pleura: No significant pericardial or pleural effusions noted      CONCLUSIONS:  1. Normal technically limited study.  2. The left ventricle was best visualized in the shortaxis view. The LV   systolic function in that view appears preserved with a visually   estimated ejection fraction of 55-60%.  3. I'm unable to rule out regional wall motion abnormalities given the   poor endomyocardial border definition.  4. Normal mitral valve with trace regurgitation.  5. Sclerotic trileaflet aortic valve with no insufficiency or stenosis.  6. Poorly visualized right-sided chambers.  7. Limited study to assess pulmonary artery systolic pressure.      MAC TAYLOR   This document has been electronically signed. Oct 27 2018 11:31AM      < end of copied text >    < from: Xray Chest 1 View- PORTABLE-Urgent (01.06.19 @ 09:37) >    EXAM:  XR CHEST PORTABLE URGENT 1V                          PROCEDURE DATE:  01/06/2019      INTERPRETATION:  Shortness of breath.    AP chest. Prior 12/21/2018.    Status post median sternotomy. No change heart mediastinum. Lungs are   emphysematous with increased lung volumes relatively oligemic upper lung   fields. Chronic accentuated bibasilar bronchovascular markings. No acute   infiltrate pneumothorax or pleural collection.    Impression: Uncomplicated COPD. No active infiltrates. Stable exam.    RODRIGO FAITH M.D., ATTENDING RADIOLOGIST  This document has been electronically signed. Jan 6 2019  9:46AM     < end of copied text >

## 2019-01-08 NOTE — PROGRESS NOTE ADULT - SUBJECTIVE AND OBJECTIVE BOX
Patient is a 78y old  Male who presents with a chief complaint of SOB (2019 12:33)      INTERVAL HPI/OVERNIGHT EVENTS: Patient seen and examined at bedside.    MEDICATIONS  (STANDING):  ALBUTerol/ipratropium for Nebulization 3 milliLiter(s) Nebulizer every 4 hours  aspirin enteric coated 81 milliGRAM(s) Oral daily  atorvastatin 40 milliGRAM(s) Oral at bedtime  azithromycin   Tablet 500 milliGRAM(s) Oral every 24 hours  buDESOnide   0.5 milliGRAM(s) Respule 0.5 milliGRAM(s) Inhalation two times a day  clopidogrel Tablet 75 milliGRAM(s) Oral daily  dextrose 5%. 1000 milliLiter(s) (50 mL/Hr) IV Continuous <Continuous>  dextrose 50% Injectable 12.5 Gram(s) IV Push once  dextrose 50% Injectable 25 Gram(s) IV Push once  dextrose 50% Injectable 25 Gram(s) IV Push once  heparin  Injectable 5000 Unit(s) SubCutaneous every 12 hours  insulin glargine Injectable (LANTUS) 15 Unit(s) SubCutaneous at bedtime  insulin lispro (HumaLOG) corrective regimen sliding scale   SubCutaneous three times a day before meals  insulin lispro (HumaLOG) corrective regimen sliding scale   SubCutaneous at bedtime  insulin lispro Injectable (HumaLOG) 5 Unit(s) SubCutaneous three times a day before meals  methylPREDNISolone sodium succinate Injectable 20 milliGRAM(s) IV Push three times a day  metoprolol tartrate 12.5 milliGRAM(s) Oral two times a day  multivitamin 1 Tablet(s) Oral daily  pantoprazole    Tablet 40 milliGRAM(s) Oral before breakfast  tamsulosin 0.4 milliGRAM(s) Oral at bedtime  valsartan 80 milliGRAM(s) Oral daily    MEDICATIONS  (PRN):  dextrose 40% Gel 15 Gram(s) Oral once PRN Blood Glucose LESS THAN 70 milliGRAM(s)/deciliter  glucagon  Injectable 1 milliGRAM(s) IntraMuscular once PRN Glucose LESS THAN 70 milligrams/deciliter      Allergies    No Known Allergies    Intolerances    shellfish (Nausea)      REVIEW OF SYSTEMS:  CONSTITUTIONAL: No fever or chills  HEENT: No headache, no sore throat  RESPIRATORY: No cough, wheezing, or shortness of breath  CARDIOVASCULAR: No chest pain, palpitations, or leg swelling  GASTROINTESTINAL: No abd pain, nausea, vomiting, or diarrhea  GENITOURINARY: No dysuria, frequency, or hematuria  NEUROLOGICAL: No focal weakness or dizziness  MUSCULOSKELETAL: No myalgias     Vital Signs Last 24 Hrs  T(C): 36.5 (2019 12:07), Max: 36.9 (2019 16:15)  T(F): 97.7 (2019 12:07), Max: 98.5 (2019 16:15)  HR: 88 (2019 12:07) (68 - 102)  BP: 119/79 (2019 12:07) (109/67 - 131/83)  BP(mean): --  RR: 16 (2019 12:07) (16 - 17)  SpO2: 98% (2019 12:07) (97% - 100%)    PHYSICAL EXAM:  GENERAL: NAD  HEENT: EOMI, moist mucous membranes  CHEST/LUNG: CTA b/l, no rales, wheezes, or rhonchi  HEART: RRR, S1, S2  ABDOMEN: BS+, soft, nontender, nondistended  EXTREMITIES: No edema, cyanosis, or calf tenderness  NERVOUS SYSTEM: AA&Ox3    LABS:                        12.1   8.93  )-----------( 240      ( 2019 07:20 )             39.1     CBC Full  -  ( 2019 07:20 )  WBC Count : 8.93 K/uL  Hemoglobin : 12.1 g/dL  Hematocrit : 39.1 %  Platelet Count - Automated : 240 K/uL  Mean Cell Volume : 90.1 fl  Mean Cell Hemoglobin : 27.9 pg  Mean Cell Hemoglobin Concentration : 30.9 gm/dL  Auto Neutrophil # : x  Auto Lymphocyte # : x  Auto Monocyte # : x  Auto Eosinophil # : x  Auto Basophil # : x  Auto Neutrophil % : x  Auto Lymphocyte % : x  Auto Monocyte % : x  Auto Eosinophil % : x  Auto Basophil % : x    2019 07:20    139    |  99     |  18     ----------------------------<  213    4.6     |  37     |  1.10     Ca    9.1        2019 07:20      PT/INR - ( 2019 07:09 )   PT: 11.6 sec;   INR: 1.02 ratio           Urinalysis Basic - ( 2019 11:34 )  Color: Pale Yellow / Appearance: Clear / S.010 / pH: x  Gluc: x / Ketone: Negative  / Bili: Negative / Urobili: Negative   Blood: x / Protein: Negative / Nitrite: Negative   Leuk Esterase: Negative / RBC: x / WBC x   Sq Epi: x / Non Sq Epi: x / Bacteria: x      CAPILLARY BLOOD GLUCOSE  POCT Blood Glucose.: 354 mg/dL (2019 12:09)  POCT Blood Glucose.: 217 mg/dL (2019 07:53)  POCT Blood Glucose.: 298 mg/dL (2019 21:32)  POCT Blood Glucose.: 139 mg/dL (2019 17:12)        Culture - Blood (collected 19 @ 14:37)  Source: .Blood Blood-Peripheral  Preliminary Report (19 @ 15:26):    No growth to date.    Culture - Blood (collected 19 @ 14:37)  Source: .Blood Blood-Peripheral  Preliminary Report (19 @ 15:26):    No growth to date.        RADIOLOGY & ADDITIONAL TESTS:    Personally reviewed.     Consultant(s) Notes Reviewed:  [x] YES  [ ] NO Patient is a 78y old  Male who presents with a chief complaint of SOB (2019 12:33)      INTERVAL HPI/OVERNIGHT EVENTS: Patient seen and examined at bedside. Denied any CP, SOB or other complaints. Per pt and RN, he used his BiPAP throughout the night.    MEDICATIONS  (STANDING):  ALBUTerol/ipratropium for Nebulization 3 milliLiter(s) Nebulizer every 4 hours  aspirin enteric coated 81 milliGRAM(s) Oral daily  atorvastatin 40 milliGRAM(s) Oral at bedtime  azithromycin   Tablet 500 milliGRAM(s) Oral every 24 hours  buDESOnide   0.5 milliGRAM(s) Respule 0.5 milliGRAM(s) Inhalation two times a day  clopidogrel Tablet 75 milliGRAM(s) Oral daily  dextrose 5%. 1000 milliLiter(s) (50 mL/Hr) IV Continuous <Continuous>  dextrose 50% Injectable 12.5 Gram(s) IV Push once  dextrose 50% Injectable 25 Gram(s) IV Push once  dextrose 50% Injectable 25 Gram(s) IV Push once  heparin  Injectable 5000 Unit(s) SubCutaneous every 12 hours  insulin glargine Injectable (LANTUS) 15 Unit(s) SubCutaneous at bedtime  insulin lispro (HumaLOG) corrective regimen sliding scale   SubCutaneous three times a day before meals  insulin lispro (HumaLOG) corrective regimen sliding scale   SubCutaneous at bedtime  insulin lispro Injectable (HumaLOG) 5 Unit(s) SubCutaneous three times a day before meals  methylPREDNISolone sodium succinate Injectable 20 milliGRAM(s) IV Push three times a day  metoprolol tartrate 12.5 milliGRAM(s) Oral two times a day  multivitamin 1 Tablet(s) Oral daily  pantoprazole    Tablet 40 milliGRAM(s) Oral before breakfast  tamsulosin 0.4 milliGRAM(s) Oral at bedtime  valsartan 80 milliGRAM(s) Oral daily    MEDICATIONS  (PRN):  dextrose 40% Gel 15 Gram(s) Oral once PRN Blood Glucose LESS THAN 70 milliGRAM(s)/deciliter  glucagon  Injectable 1 milliGRAM(s) IntraMuscular once PRN Glucose LESS THAN 70 milligrams/deciliter      Allergies    No Known Allergies    Intolerances    shellfish (Nausea)      REVIEW OF SYSTEMS:  CONSTITUTIONAL: No fever or chills  HEENT: No headache, no sore throat  RESPIRATORY: No cough, wheezing, or shortness of breath (improved)  CARDIOVASCULAR: No chest pain, palpitations, or leg swelling  GASTROINTESTINAL: No abd pain, nausea, vomiting, or diarrhea  GENITOURINARY: No dysuria, frequency, or hematuria  NEUROLOGICAL: No focal weakness or dizziness  MUSCULOSKELETAL: No myalgias     Vital Signs Last 24 Hrs  T(C): 36.5 (2019 12:07), Max: 36.9 (2019 16:15)  T(F): 97.7 (2019 12:07), Max: 98.5 (2019 16:15)  HR: 88 (2019 12:07) (68 - 102)  BP: 119/79 (2019 12:07) (109/67 - 131/83)  BP(mean): --  RR: 16 (2019 12:07) (16 - 17)  SpO2: 98% (2019 12:07) (97% - 100%)    PHYSICAL EXAM:  GENERAL: NAD  HEENT: EOMI, moist mucous membranes  CHEST/LUNG: decreased BS b/l but no rales, wheezes, or rhonchi  HEART: RRR, S1, S2  ABDOMEN: BS+, soft, nontender, nondistended  EXTREMITIES: No edema, cyanosis, or calf tenderness  NERVOUS SYSTEM: AA&Ox3    LABS:                        12.1   8.93  )-----------( 240      ( 2019 07:20 )             39.1     CBC Full  -  ( 2019 07:20 )  WBC Count : 8.93 K/uL  Hemoglobin : 12.1 g/dL  Hematocrit : 39.1 %  Platelet Count - Automated : 240 K/uL  Mean Cell Volume : 90.1 fl  Mean Cell Hemoglobin : 27.9 pg  Mean Cell Hemoglobin Concentration : 30.9 gm/dL  Auto Neutrophil # : x  Auto Lymphocyte # : x  Auto Monocyte # : x  Auto Eosinophil # : x  Auto Basophil # : x  Auto Neutrophil % : x  Auto Lymphocyte % : x  Auto Monocyte % : x  Auto Eosinophil % : x  Auto Basophil % : x    2019 07:20    139    |  99     |  18     ----------------------------<  213    4.6     |  37     |  1.10     Ca    9.1        2019 07:20      PT/INR - ( 2019 07:09 )   PT: 11.6 sec;   INR: 1.02 ratio           Urinalysis Basic - ( 2019 11:34 )  Color: Pale Yellow / Appearance: Clear / S.010 / pH: x  Gluc: x / Ketone: Negative  / Bili: Negative / Urobili: Negative   Blood: x / Protein: Negative / Nitrite: Negative   Leuk Esterase: Negative / RBC: x / WBC x   Sq Epi: x / Non Sq Epi: x / Bacteria: x      CAPILLARY BLOOD GLUCOSE  POCT Blood Glucose.: 354 mg/dL (2019 12:09)  POCT Blood Glucose.: 217 mg/dL (2019 07:53)  POCT Blood Glucose.: 298 mg/dL (2019 21:32)  POCT Blood Glucose.: 139 mg/dL (2019 17:12)        Culture - Blood (collected 19 @ 14:37)  Source: .Blood Blood-Peripheral  Preliminary Report (19 @ 15:26):    No growth to date.    Culture - Blood (collected 19 @ 14:37)  Source: .Blood Blood-Peripheral  Preliminary Report (19 @ 15:26):    No growth to date.        RADIOLOGY & ADDITIONAL TESTS:    Personally reviewed.     Consultant(s) Notes Reviewed:  [x] YES  [ ] NO Patient is a 78y old  Male who presents with a chief complaint of SOB (2019 12:33)      INTERVAL HPI/OVERNIGHT EVENTS: Patient seen and examined at bedside. Denied any CP, SOB or other complaints. Per pt and RN, he used his BiPAP throughout the night.    MEDICATIONS  (STANDING):  ALBUTerol/ipratropium for Nebulization 3 milliLiter(s) Nebulizer every 4 hours  aspirin enteric coated 81 milliGRAM(s) Oral daily  atorvastatin 40 milliGRAM(s) Oral at bedtime  azithromycin   Tablet 500 milliGRAM(s) Oral every 24 hours  buDESOnide   0.5 milliGRAM(s) Respule 0.5 milliGRAM(s) Inhalation two times a day  clopidogrel Tablet 75 milliGRAM(s) Oral daily  dextrose 5%. 1000 milliLiter(s) (50 mL/Hr) IV Continuous <Continuous>  dextrose 50% Injectable 12.5 Gram(s) IV Push once  dextrose 50% Injectable 25 Gram(s) IV Push once  dextrose 50% Injectable 25 Gram(s) IV Push once  heparin  Injectable 5000 Unit(s) SubCutaneous every 12 hours  insulin glargine Injectable (LANTUS) 15 Unit(s) SubCutaneous at bedtime  insulin lispro (HumaLOG) corrective regimen sliding scale   SubCutaneous three times a day before meals  insulin lispro (HumaLOG) corrective regimen sliding scale   SubCutaneous at bedtime  insulin lispro Injectable (HumaLOG) 5 Unit(s) SubCutaneous three times a day before meals  methylPREDNISolone sodium succinate Injectable 20 milliGRAM(s) IV Push three times a day  metoprolol tartrate 12.5 milliGRAM(s) Oral two times a day  multivitamin 1 Tablet(s) Oral daily  pantoprazole    Tablet 40 milliGRAM(s) Oral before breakfast  tamsulosin 0.4 milliGRAM(s) Oral at bedtime  valsartan 80 milliGRAM(s) Oral daily    MEDICATIONS  (PRN):  dextrose 40% Gel 15 Gram(s) Oral once PRN Blood Glucose LESS THAN 70 milliGRAM(s)/deciliter  glucagon  Injectable 1 milliGRAM(s) IntraMuscular once PRN Glucose LESS THAN 70 milligrams/deciliter      Allergies    No Known Allergies    Intolerances    shellfish (Nausea)      REVIEW OF SYSTEMS:  CONSTITUTIONAL: No fever or chills  HEENT: No headache, no sore throat  RESPIRATORY: No cough, wheezing, or shortness of breath (improved)  CARDIOVASCULAR: No chest pain, palpitations, or leg swelling  GASTROINTESTINAL: No abd pain, nausea, vomiting, or diarrhea  GENITOURINARY: No dysuria, frequency, or hematuria  NEUROLOGICAL: No focal weakness or dizziness  MUSCULOSKELETAL: No myalgias     Vital Signs Last 24 Hrs  T(C): 36.5 (2019 12:07), Max: 36.9 (2019 16:15)  T(F): 97.7 (2019 12:07), Max: 98.5 (2019 16:15)  HR: 88 (2019 12:07) (68 - 102)  BP: 119/79 (2019 12:07) (109/67 - 131/83)  BP(mean): --  RR: 16 (2019 12:07) (16 - 17)  SpO2: 98% (2019 12:07) (97% - 100%)    PHYSICAL EXAM:  GENERAL: NAD  HEENT: EOMI, moist mucous membranes  CHEST/LUNG: decreased breath sounds with scattered wheezes   HEART: RRR, S1, S2  ABDOMEN: BS+, soft, nontender, nondistended  EXTREMITIES: No edema, cyanosis, or calf tenderness  NERVOUS SYSTEM: AA&Ox3    LABS:                        12.1   8.93  )-----------( 240      ( 2019 07:20 )             39.1     CBC Full  -  ( 2019 07:20 )  WBC Count : 8.93 K/uL  Hemoglobin : 12.1 g/dL  Hematocrit : 39.1 %  Platelet Count - Automated : 240 K/uL  Mean Cell Volume : 90.1 fl  Mean Cell Hemoglobin : 27.9 pg  Mean Cell Hemoglobin Concentration : 30.9 gm/dL  Auto Neutrophil # : x  Auto Lymphocyte # : x  Auto Monocyte # : x  Auto Eosinophil # : x  Auto Basophil # : x  Auto Neutrophil % : x  Auto Lymphocyte % : x  Auto Monocyte % : x  Auto Eosinophil % : x  Auto Basophil % : x    2019 07:20    139    |  99     |  18     ----------------------------<  213    4.6     |  37     |  1.10     Ca    9.1        2019 07:20      PT/INR - ( 2019 07:09 )   PT: 11.6 sec;   INR: 1.02 ratio           Urinalysis Basic - ( 2019 11:34 )  Color: Pale Yellow / Appearance: Clear / S.010 / pH: x  Gluc: x / Ketone: Negative  / Bili: Negative / Urobili: Negative   Blood: x / Protein: Negative / Nitrite: Negative   Leuk Esterase: Negative / RBC: x / WBC x   Sq Epi: x / Non Sq Epi: x / Bacteria: x      CAPILLARY BLOOD GLUCOSE  POCT Blood Glucose.: 354 mg/dL (2019 12:09)  POCT Blood Glucose.: 217 mg/dL (2019 07:53)  POCT Blood Glucose.: 298 mg/dL (2019 21:32)  POCT Blood Glucose.: 139 mg/dL (2019 17:12)        Culture - Blood (collected 19 @ 14:37)  Source: .Blood Blood-Peripheral  Preliminary Report (19 @ 15:26):    No growth to date.    Culture - Blood (collected 19 @ 14:37)  Source: .Blood Blood-Peripheral  Preliminary Report (19 @ 15:26):    No growth to date.        RADIOLOGY & ADDITIONAL TESTS:    Personally reviewed.     Consultant(s) Notes Reviewed:  [x] YES  [ ] NO

## 2019-01-08 NOTE — PROGRESS NOTE ADULT - ASSESSMENT
77 yo M with Hx asthma, COPD with frequent admissions to this and other hospitals, chronic hypoxemic respiratory failure, CAD with 3v CABG 2004, without any other more recent cardiac issue.  His wife reports a normal stress test in 8/2018.  He has peripheral vascular disease status post stents years ago, which have been found patent by us.  He was here in October with resp failure, complicated by a bradycardic/PEA arrest. Echo during that hospitalization revealed a normal lvef without significant valve disease. S/P acute hypercarbic resp failure, with a pH of 7.15.   After being off bipap at home overnight S/P bipap    - there is no evidence of acute ischemia.  - Stable CAD s/p remote cabg and pad s/p peripheral stent 2 years ago  - cont DAPT, BB, statin  - bp controlled at present, cont valsartan    - There is no evidence of significant arrhythmia.  - May discontinue telemetry monitoring  - There is no evidence for meaningful  volume overload.    - DVT prophylaxis  - Monitor and replete lytes, keep K>4 and Mg >2  - Discharge planning.  Patient follows up with Dr. Pallavi Rod  - All other workup per primary team    Dena Maynard NP  Cardiology

## 2019-01-08 NOTE — PROGRESS NOTE ADULT - PROBLEM SELECTOR PLAN 1
- Acute on chronic hypoxic and hypercapnic respiratory failure 2/2 COPD exacerbation likely 2/2 Coronavirus (positive rapid RVP), patient also noncompliant with nightly CPAP use.  - No evidence of DVT on dopplers, D-Dimer wnl, troponin negative x3, TTE in 10/2018 showed normal LVEF with no significant valvular disease.  - Latest ABG showed further improvement to what is likely near pt's baseline.  - Continue supplemental O2 during the day/BiPAP at night.  - Adjusted solu-medrol to 20 mg TID.  - Continue Duoneb q4h, Pulmicort BID, and Zithromax.  - Legionella Ag negative.  - f/u BCx and Strep urine antigen. - Acute on chronic hypoxic and hypercapnic respiratory failure 2/2 COPD exacerbation likely 2/2 Coronavirus (positive rapid RVP), patient also noncompliant with nightly CPAP use.  - No evidence of DVT on dopplers, D-Dimer wnl, troponin negative x3, TTE in 10/2018 showed normal LVEF with no significant valvular disease.  - Latest ABG showed further improvement to what is likely near pt's baseline.  - Continue supplemental O2 during the day/BiPAP at night.  - decreased solu-medrol to 20 mg TID.  - Continue Duoneb q4h, Pulmicort BID, and Zithromax.  - Legionella Ag negative.  - f/u BCx and Strep urine antigen.

## 2019-01-09 LAB
ANION GAP SERPL CALC-SCNC: 4 MMOL/L — LOW (ref 5–17)
BASE EXCESS BLDA CALC-SCNC: 10.1 MMOL/L — HIGH (ref -2–2)
BLOOD GAS COMMENTS ARTERIAL: SIGNIFICANT CHANGE UP
BLOOD GAS COMMENTS ARTERIAL: SIGNIFICANT CHANGE UP
BUN SERPL-MCNC: 23 MG/DL — SIGNIFICANT CHANGE UP (ref 7–23)
CALCIUM SERPL-MCNC: 8.9 MG/DL — SIGNIFICANT CHANGE UP (ref 8.5–10.1)
CHLORIDE SERPL-SCNC: 97 MMOL/L — SIGNIFICANT CHANGE UP (ref 96–108)
CO2 SERPL-SCNC: 38 MMOL/L — HIGH (ref 22–31)
CREAT SERPL-MCNC: 1.2 MG/DL — SIGNIFICANT CHANGE UP (ref 0.5–1.3)
GLUCOSE SERPL-MCNC: 252 MG/DL — HIGH (ref 70–99)
HCO3 BLDA-SCNC: 33 MMOL/L — HIGH (ref 23–27)
HCT VFR BLD CALC: 38.7 % — LOW (ref 39–50)
HGB BLD-MCNC: 12 G/DL — LOW (ref 13–17)
HOROWITZ INDEX BLDA+IHG-RTO: 25 — SIGNIFICANT CHANGE UP
MCHC RBC-ENTMCNC: 27.6 PG — SIGNIFICANT CHANGE UP (ref 27–34)
MCHC RBC-ENTMCNC: 31 GM/DL — LOW (ref 32–36)
MCV RBC AUTO: 89.2 FL — SIGNIFICANT CHANGE UP (ref 80–100)
NRBC # BLD: 0 /100 WBCS — SIGNIFICANT CHANGE UP (ref 0–0)
PCO2 BLDA: 58 MMHG — HIGH (ref 32–46)
PH BLDA: 7.4 — SIGNIFICANT CHANGE UP (ref 7.35–7.45)
PLATELET # BLD AUTO: 248 K/UL — SIGNIFICANT CHANGE UP (ref 150–400)
PO2 BLDA: 106 MMHG — SIGNIFICANT CHANGE UP (ref 74–108)
POTASSIUM SERPL-MCNC: 4.8 MMOL/L — SIGNIFICANT CHANGE UP (ref 3.5–5.3)
POTASSIUM SERPL-SCNC: 4.8 MMOL/L — SIGNIFICANT CHANGE UP (ref 3.5–5.3)
RBC # BLD: 4.34 M/UL — SIGNIFICANT CHANGE UP (ref 4.2–5.8)
RBC # FLD: 12.8 % — SIGNIFICANT CHANGE UP (ref 10.3–14.5)
SAO2 % BLDA: 98 % — HIGH (ref 92–96)
SODIUM SERPL-SCNC: 139 MMOL/L — SIGNIFICANT CHANGE UP (ref 135–145)
WBC # BLD: 10.09 K/UL — SIGNIFICANT CHANGE UP (ref 3.8–10.5)
WBC # FLD AUTO: 10.09 K/UL — SIGNIFICANT CHANGE UP (ref 3.8–10.5)

## 2019-01-09 PROCEDURE — 87086 URINE CULTURE/COLONY COUNT: CPT

## 2019-01-09 PROCEDURE — 80053 COMPREHEN METABOLIC PANEL: CPT

## 2019-01-09 PROCEDURE — 81003 URINALYSIS AUTO W/O SCOPE: CPT

## 2019-01-09 PROCEDURE — 87486 CHLMYD PNEUM DNA AMP PROBE: CPT

## 2019-01-09 PROCEDURE — 99231 SBSQ HOSP IP/OBS SF/LOW 25: CPT

## 2019-01-09 PROCEDURE — 82553 CREATINE MB FRACTION: CPT

## 2019-01-09 PROCEDURE — 87581 M.PNEUMON DNA AMP PROBE: CPT

## 2019-01-09 PROCEDURE — 87631 RESP VIRUS 3-5 TARGETS: CPT

## 2019-01-09 PROCEDURE — 85610 PROTHROMBIN TIME: CPT

## 2019-01-09 PROCEDURE — 96372 THER/PROPH/DIAG INJ SC/IM: CPT | Mod: XU

## 2019-01-09 PROCEDURE — 93005 ELECTROCARDIOGRAM TRACING: CPT

## 2019-01-09 PROCEDURE — 97162 PT EVAL MOD COMPLEX 30 MIN: CPT

## 2019-01-09 PROCEDURE — 82248 BILIRUBIN DIRECT: CPT

## 2019-01-09 PROCEDURE — 82550 ASSAY OF CK (CPK): CPT

## 2019-01-09 PROCEDURE — 94664 DEMO&/EVAL PT USE INHALER: CPT

## 2019-01-09 PROCEDURE — 85027 COMPLETE CBC AUTOMATED: CPT

## 2019-01-09 PROCEDURE — 93970 EXTREMITY STUDY: CPT

## 2019-01-09 PROCEDURE — 99239 HOSP IP/OBS DSCHRG MGMT >30: CPT

## 2019-01-09 PROCEDURE — 87040 BLOOD CULTURE FOR BACTERIA: CPT

## 2019-01-09 PROCEDURE — 94660 CPAP INITIATION&MGMT: CPT

## 2019-01-09 PROCEDURE — 87633 RESP VIRUS 12-25 TARGETS: CPT

## 2019-01-09 PROCEDURE — 96365 THER/PROPH/DIAG IV INF INIT: CPT

## 2019-01-09 PROCEDURE — 84100 ASSAY OF PHOSPHORUS: CPT

## 2019-01-09 PROCEDURE — 85730 THROMBOPLASTIN TIME PARTIAL: CPT

## 2019-01-09 PROCEDURE — 83735 ASSAY OF MAGNESIUM: CPT

## 2019-01-09 PROCEDURE — 83036 HEMOGLOBIN GLYCOSYLATED A1C: CPT

## 2019-01-09 PROCEDURE — 99232 SBSQ HOSP IP/OBS MODERATE 35: CPT

## 2019-01-09 PROCEDURE — 99221 1ST HOSP IP/OBS SF/LOW 40: CPT

## 2019-01-09 PROCEDURE — 84484 ASSAY OF TROPONIN QUANT: CPT

## 2019-01-09 PROCEDURE — 99291 CRITICAL CARE FIRST HOUR: CPT | Mod: 25

## 2019-01-09 PROCEDURE — 82803 BLOOD GASES ANY COMBINATION: CPT

## 2019-01-09 PROCEDURE — 87449 NOS EACH ORGANISM AG IA: CPT

## 2019-01-09 PROCEDURE — 82962 GLUCOSE BLOOD TEST: CPT

## 2019-01-09 PROCEDURE — 83880 ASSAY OF NATRIURETIC PEPTIDE: CPT

## 2019-01-09 PROCEDURE — 80048 BASIC METABOLIC PNL TOTAL CA: CPT

## 2019-01-09 PROCEDURE — 96375 TX/PRO/DX INJ NEW DRUG ADDON: CPT

## 2019-01-09 PROCEDURE — 85379 FIBRIN DEGRADATION QUANT: CPT

## 2019-01-09 PROCEDURE — 71045 X-RAY EXAM CHEST 1 VIEW: CPT

## 2019-01-09 PROCEDURE — 94640 AIRWAY INHALATION TREATMENT: CPT

## 2019-01-09 PROCEDURE — 36415 COLL VENOUS BLD VENIPUNCTURE: CPT

## 2019-01-09 PROCEDURE — 94760 N-INVAS EAR/PLS OXIMETRY 1: CPT

## 2019-01-09 PROCEDURE — 36600 WITHDRAWAL OF ARTERIAL BLOOD: CPT

## 2019-01-09 RX ORDER — PANTOPRAZOLE SODIUM 20 MG/1
1 TABLET, DELAYED RELEASE ORAL
Qty: 5 | Refills: 0 | OUTPATIENT
Start: 2019-01-09 | End: 2019-01-13

## 2019-01-09 RX ORDER — INSULIN ASPART 100 [IU]/ML
0 INJECTION, SOLUTION SUBCUTANEOUS
Qty: 0 | Refills: 0 | COMMUNITY

## 2019-01-09 RX ADMIN — Medication 3 MILLILITER(S): at 07:44

## 2019-01-09 RX ADMIN — CLOPIDOGREL BISULFATE 75 MILLIGRAM(S): 75 TABLET, FILM COATED ORAL at 12:23

## 2019-01-09 RX ADMIN — Medication 5 UNIT(S): at 17:02

## 2019-01-09 RX ADMIN — Medication 3 MILLILITER(S): at 03:02

## 2019-01-09 RX ADMIN — Medication 3: at 08:33

## 2019-01-09 RX ADMIN — Medication 4: at 12:23

## 2019-01-09 RX ADMIN — Medication 12.5 MILLIGRAM(S): at 05:29

## 2019-01-09 RX ADMIN — Medication 3 MILLILITER(S): at 14:48

## 2019-01-09 RX ADMIN — Medication 1 TABLET(S): at 12:23

## 2019-01-09 RX ADMIN — HEPARIN SODIUM 5000 UNIT(S): 5000 INJECTION INTRAVENOUS; SUBCUTANEOUS at 05:29

## 2019-01-09 RX ADMIN — Medication 3 MILLILITER(S): at 11:11

## 2019-01-09 RX ADMIN — VALSARTAN 80 MILLIGRAM(S): 80 TABLET ORAL at 05:29

## 2019-01-09 RX ADMIN — Medication 3: at 17:01

## 2019-01-09 RX ADMIN — PANTOPRAZOLE SODIUM 40 MILLIGRAM(S): 20 TABLET, DELAYED RELEASE ORAL at 05:29

## 2019-01-09 RX ADMIN — Medication 5 UNIT(S): at 12:24

## 2019-01-09 RX ADMIN — Medication 20 MILLIGRAM(S): at 05:29

## 2019-01-09 RX ADMIN — AZITHROMYCIN 500 MILLIGRAM(S): 500 TABLET, FILM COATED ORAL at 17:01

## 2019-01-09 RX ADMIN — HEPARIN SODIUM 5000 UNIT(S): 5000 INJECTION INTRAVENOUS; SUBCUTANEOUS at 17:01

## 2019-01-09 RX ADMIN — Medication 0.5 MILLIGRAM(S): at 07:44

## 2019-01-09 RX ADMIN — Medication 81 MILLIGRAM(S): at 12:23

## 2019-01-09 RX ADMIN — Medication 20 MILLIGRAM(S): at 13:20

## 2019-01-09 RX ADMIN — Medication 12.5 MILLIGRAM(S): at 17:01

## 2019-01-09 RX ADMIN — Medication 5 UNIT(S): at 08:33

## 2019-01-09 NOTE — PROGRESS NOTE ADULT - ASSESSMENT
79 yo M with Hx asthma, COPD with frequent admissions to this and other hospitals, chronic hypoxemic respiratory failure, CAD with 3v CABG 2004, without any other more recent cardiac issue.  His wife reports a normal stress test in 8/2018.  He has peripheral vascular disease status post stents years ago, which have been found patent by us.  He was here in October with resp failure, complicated by a bradycardic/PEA arrest. Echo during that hospitalization revealed a normal lvef without significant valve disease. S/P acute hypercarbic resp failure, with a pH of 7.15.   After being off bipap at home overnight S/P bipap    - there is no evidence of acute ischemia.  - Stable CAD s/p remote cabg and pad s/p peripheral stent 2 years ago  - cont DAPT, BB, statin  - bp controlled at present, cont valsartan    - There is no evidence of significant arrhythmia.  - May discontinue telemetry monitoring  - There is no evidence for meaningful  volume overload.    - DVT prophylaxis  - Monitor and replete lytes, keep K>4 and Mg >2  - Discharge planning.  Patient follows up with Dr. Pallavi Rod  - All other workup per primary team    Austin GUY  Cardiology

## 2019-01-09 NOTE — PHYSICAL THERAPY INITIAL EVALUATION ADULT - PERTINENT HX OF CURRENT PROBLEM, REHAB EVAL
Patient is a 79 yo male with pmhx of htn, DM2 (occasional insulin use when on steroids), asthma, COPD (2L home/ BIPAP at night), chronic hypoxemic resp failure, CABG, HLD, hypercapnic resp failure c/b with cardiac arrest, presenting to ED with SOB. Per patient and wife, started experiencing mild SOB, wife stated that patient complained of feeling more tired than usual, increased his O2 to 3L.

## 2019-01-09 NOTE — PROGRESS NOTE ADULT - ATTENDING COMMENTS
I personally saw and examined the patient in detail.  I have spoken to the above provider regarding the assessment and plan of care.  I reviewed the above assessment and plan of care, and agree.  I have made changes in the body of the note where appropriate.
I saw and examined the patient personally. Spoke with above provider regarding this case. I reviewed the above findings completely.  I agree with the above history, physical, and plan which I have edited where appropriate.
Seen/examined. agree with above.
Pt seen + examined. Case discussed with intern/resident. Agree with assessment and plan above (edited) with following addendum:  Time spent: 40min. More than 50% of the visit was spent counseling the patient on medical condition -- acute on chronic hypoxic and hypercapnic respiratory failure, COPD exacerbation, Coronavirus infection, BiPAP use/importance.     77 yo male with PMH of HTN, DM2 (occasional insulin use when on steroids), asthma, COPD (2L home/BiPAP at night), chronic hypoxemic and hypercapnic resp failure, CABG, HLD, hx of cardiac arrest, presented to ED with SOB and was admitted for acute on chronic hypoxic and hypercapnic respiratory failure 2/2 COPD exacerbation likely 2/2 Coronavirus infection.  -acute on chronic hypoxic and hypercapnic respiratory failure 2/2 COPD exacerbation likely 2/2 Coronavirus (positive rapid RVP), patient also noncompliant with nightly CPAP use.  - pH of 7.15 with pCO2 of 90 on admission; now 7.39 and 55 on latest ABG which is likely near pt's baseline.  - Continue supplemental O2 during the day/BiPAP at night.  - discussed with pulm (Dr. Dejesus), decreased solu-medrol to 20 TID today, Duoneb Q4H, Pulmicort BID, and Zithromax. --- consider de-escalating to po steroids tomorrow and discharged home given pt's great improvement.   - No evidence of DVT on dopplers, D-Dimer wnl, troponin negative x3, TTE in 10/2018 showed normal LVEF with no significant valvular disease.  - cultures negative
Pt seen + examined. Case discussed with intern/resident. Agree with assessment and plan above (edited) with following addendum:  Time spent: 40min. More than 50% of the visit was spent counseling the patient on medical condition -- acute on chronic hypoxic and hypercapnic respiratory failure, COPD exacerbation, Coronavirus infection, BiPAP use/importance.     79 yo male with PMH of HTN, DM2 (occasional insulin use when on steroids), asthma, COPD (2L home/BiPAP at night), chronic hypoxemic and hypercapnic resp failure, CABG, HLD, hx of cardiac arrest, presented to ED with SOB and was admitted for acute on chronic hypoxic and hypercapnic respiratory failure 2/2 COPD exacerbation likely 2/2 Coronavirus infection.  -acute on chronic hypoxic and hypercapnic respiratory failure 2/2 COPD exacerbation likely 2/2 Coronavirus (positive rapid RVP), patient also noncompliant with nightly CPAP use.  - pH of 7.15 with pCO2 of 90 on admission yesterday; now 7.39 and 55 on latest ABG which is likely near pt's baseline.  - Continue supplemental O2 during the day/BiPAP at night.  - Per pulm (Dr. Dejesus), continue solu-medrol 40 TID, Duoneb Q4H, Pulmicort BID, and Zithromax. --- consider de-escalating steroids tomorrow given pt's great improvement.   - No evidence of DVT on dopplers, D-Dimer wnl, troponin negative x3, TTE in 10/2018 showed normal LVEF with no significant valvular disease.  - f/u BCx, Legionella, Strep urine antigen.

## 2019-01-09 NOTE — PROGRESS NOTE ADULT - SUBJECTIVE AND OBJECTIVE BOX
[facility] -- Saud Aguilar, Génesis Louise Pannella, Patel, Savella  Office # [phone]      Follow Up:    Acute resp failure  Subjective/Observations:   No events overnight resting comfortably in bed.  No complaints of chest pain, dyspnea, or palpitations reported. No signs of orthopnea or PND. On bipap    REVIEW OF SYSTEMS: All other review of systems is negative unless indicated above    PAST MEDICAL & SURGICAL HISTORY:  PVD (peripheral vascular disease)  Hypertension  COPD (chronic obstructive pulmonary disease)  Osteomyelitis  Dyslipidemia  CAD (Coronary Artery Disease)  Diabetes Mellitus, Type II  CABG (Coronary Artery Bypass Graft)      MEDICATIONS  (STANDING):  ALBUTerol/ipratropium for Nebulization 3 milliLiter(s) Nebulizer every 4 hours  aspirin enteric coated 81 milliGRAM(s) Oral daily  atorvastatin 40 milliGRAM(s) Oral at bedtime  azithromycin   Tablet 500 milliGRAM(s) Oral every 24 hours  buDESOnide   0.5 milliGRAM(s) Respule 0.5 milliGRAM(s) Inhalation two times a day  clopidogrel Tablet 75 milliGRAM(s) Oral daily  dextrose 5%. 1000 milliLiter(s) (50 mL/Hr) IV Continuous <Continuous>  dextrose 50% Injectable 12.5 Gram(s) IV Push once  dextrose 50% Injectable 25 Gram(s) IV Push once  dextrose 50% Injectable 25 Gram(s) IV Push once  heparin  Injectable 5000 Unit(s) SubCutaneous every 12 hours  insulin glargine Injectable (LANTUS) 15 Unit(s) SubCutaneous at bedtime  insulin lispro (HumaLOG) corrective regimen sliding scale   SubCutaneous three times a day before meals  insulin lispro (HumaLOG) corrective regimen sliding scale   SubCutaneous at bedtime  insulin lispro Injectable (HumaLOG) 5 Unit(s) SubCutaneous three times a day before meals  methylPREDNISolone sodium succinate Injectable 20 milliGRAM(s) IV Push three times a day  metoprolol tartrate 12.5 milliGRAM(s) Oral two times a day  multivitamin 1 Tablet(s) Oral daily  pantoprazole    Tablet 40 milliGRAM(s) Oral before breakfast  tamsulosin 0.4 milliGRAM(s) Oral at bedtime  valsartan 80 milliGRAM(s) Oral daily    MEDICATIONS  (PRN):  dextrose 40% Gel 15 Gram(s) Oral once PRN Blood Glucose LESS THAN 70 milliGRAM(s)/deciliter  glucagon  Injectable 1 milliGRAM(s) IntraMuscular once PRN Glucose LESS THAN 70 milligrams/deciliter      Allergies    No Known Allergies    Intolerances    shellfish (Nausea)      Vital Signs Last 24 Hrs  T(C): 36.4 (09 Jan 2019 07:50), Max: 36.9 (08 Jan 2019 15:32)  T(F): 97.5 (09 Jan 2019 07:50), Max: 98.4 (08 Jan 2019 15:32)  HR: 90 (09 Jan 2019 11:13) (61 - 95)  BP: 121/77 (09 Jan 2019 07:50) (112/71 - 122/72)  BP(mean): --  RR: 21 (09 Jan 2019 07:50) (16 - 21)  SpO2: 98% (09 Jan 2019 11:13) (94% - 100%)    I&O's Summary    08 Jan 2019 07:01  -  09 Jan 2019 07:00  --------------------------------------------------------  IN: 200 mL / OUT: 900 mL / NET: -700 mL          PHYSICAL EXAM:  TELE: Off tele   Constitutional: NAD, awake and alert, well-developed  HEENT: Moist Mucous Membranes, Anicteric  Pulmonary: Non-labored, breath sounds are clear bilaterally, No wheezing, crackles or rhonchi  Cardiovascular: Regular, S1 and S2 nl, No murmurs, rubs, gallops or clicks  Gastrointestinal: Bowel Sounds present, soft, nontender.   Lymph: No lymphadenopathy. No peripheral edema.  Skin: No visible rashes or ulcers.  Psych:  Mood & affect appropriate    LABS: All Labs Reviewed:                        12.0   10.09 )-----------( 248      ( 09 Jan 2019 07:25 )             38.7                         12.1   8.93  )-----------( 240      ( 08 Jan 2019 07:20 )             39.1                         11.7   5.85  )-----------( 211      ( 07 Jan 2019 07:09 )             38.0     09 Jan 2019 07:25    139    |  97     |  23     ----------------------------<  252    4.8     |  38     |  1.20   08 Jan 2019 07:20    139    |  99     |  18     ----------------------------<  213    4.6     |  37     |  1.10   07 Jan 2019 07:09    139    |  99     |  16     ----------------------------<  233    4.9     |  34     |  1.00     Ca    8.9        09 Jan 2019 07:25  Ca    9.1        08 Jan 2019 07:20  Ca    9.2        07 Jan 2019 07:09  Phos  2.9       07 Jan 2019 07:09    TPro  6.3    /  Alb  3.0    /  TBili  0.3    /  DBili  .10    /  AST  11     /  ALT  31     /  AlkPhos  80     07 Jan 2019 07:09      < from: TTE Echo Doppler w/o Cont (10.26.18 @ 19:52) >     EXAM:  ECHO TTE W/O CON COMP W/DOPPLR         PROCEDURE DATE:  10/26/2018        INTERPRETATION:  Ordering Physician: RICKEY TRUJILLO    Indication: Cardiac arrest    Technician:     Study Quality: Poor given that the patient was supine in the ICU   A complete echocardiographic study was performed utilizing standard   protocol including spectral and color Doppler in all echocardiographic   windows.    Height: 180 cm  Weight: 99 kg  Blood Pressure: 54/74    MEASUREMENTS  IVS: 0.99 cm  PWT: 0.78cm  LA: 3.4cm  AO: 2.9cm  LVIDd: Unable to measured given poor endocardial border definition  LVIDs: Unable to measure given poor endocardial border definition      LVEF: Visually estimated in the LV short axis view (best images of the   LV) is 55-60 %  RVSP: Unable to assess given lack of adequate TR waveform  RA Pressure: 15 mmHg  IVC: Dilated at 2.4 cm in diameter and collapses less than 50% with   inspiration (not valid if patient is intubated and on mechanical   ventilatory support)    FINDINGS  Left Ventricle: Over the left ventricle is poorly visualized. There was   best visualized in the LV short axis view. The visual estimation of the   ejection fraction is 55-60%. I'm unable to rule out regional wall motion   abnormalities given the poor acoustic windows.  Right Ventricle: Overall poorly visualized  Left Atrium: Grossly normal in size  Right Atrium: Overall poorly visualized  Mitral Valve: Normal in structure with trace mitral valve regurgitation  Aortic Valve: Trileaflet and sclerotic with no evidence of significant   insufficiency. No stenosis  Tricuspid Valve: Overall poorly visualized. There was trace tricuspid   valve regurgitation  Pulmonic Valve: Poorly visualized and poor Doppler interrogation  Diastolic Function: The LV diastolic function is indeterminate in this   study  Pericardium/Pleura: No significant pericardial or pleural effusions noted      CONCLUSIONS:  1. Normal technically limited study.  2. The left ventricle was best visualized in the shortaxis view. The LV   systolic function in that view appears preserved with a visually   estimated ejection fraction of 55-60%.  3. I'm unable to rule out regional wall motion abnormalities given the   poor endomyocardial border definition.  4. Normal mitral valve with trace regurgitation.  5. Sclerotic trileaflet aortic valve with no insufficiency or stenosis.  6. Poorly visualized right-sided chambers.  7. Limited study to assess pulmonary artery systolic pressure.      MAC TAYLOR   This document has been electronically signed. Oct 27 2018 11:31AM      < end of copied text >    < from: Xray Chest 1 View- PORTABLE-Urgent (01.06.19 @ 09:37) >    EXAM:  XR CHEST PORTABLE URGENT 1V                          PROCEDURE DATE:  01/06/2019      INTERPRETATION:  Shortness of breath.    AP chest. Prior 12/21/2018.    Status post median sternotomy. No change heart mediastinum. Lungs are   emphysematous with increased lung volumes relatively oligemic upper lung   fields. Chronic accentuated bibasilar bronchovascular markings. No acute   infiltrate pneumothorax or pleural collection.    Impression: Uncomplicated COPD. No active infiltrates. Stable exam.    RODRIGO FAITH M.D., ATTENDING RADIOLOGIST  This document has been electronically signed. Jan 6 2019  9:46AM     < end of copied text >              [facility] Salinas [facility]   Cardiology

## 2019-01-09 NOTE — PROGRESS NOTE ADULT - SUBJECTIVE AND OBJECTIVE BOX
Patient was examined Chart reviewed Detailed note will be entered later today below this once latest data has been reviewed In the meantime please refer to my previous note for Pulmonary recommendations Patient was examined Chart reviewed Detailed note will be entered later today below this once latest data has been reviewed In the meantime please refer to my previous note for Pulmonary recommendations           COMMOD[facility] TURNER [facility]    ALLERGY Shellfish  CONTACT Sp Amira EATON    REASON FOR VISIT Subseq pul fu   Initial evaluation/Pulmonary consultation requested  1/6/2019 from Dr Dejesus by Dr Alston    Patient examined chart reviewed  HOSPITAL ADMISSION [facility] Hospitalist 1/6/2019   PATIENT CAME  FROM (if information available)      VITALS/LABS                           1/9/2019 afeb 86 100/60 20 98%   1/9/2019 W 10 Hb 12 Plt 248 Na 139 K 4.8 CO2 38 Cr 1.2     GLOBAL ISSUE/BEST PRACTICE:      PROBLEM: HOB elevation:   y            PROBLEM: Stress ulcer proph:    protonix 40 (1/6)               PROBLEM: VTE prophylaxis:      Hepar (1/6)   PROBLEM: Glycemic control:      PROBLEM: Nutrition:  cons carb (1/6)   PROBLEM: Advanced directive: na     PROBLEM: Allergies:  shellfish    REVIEW OF SYMPTOMS    Able to give ROS  Yes     RELIABLE No   CONSTITUTIONAL Weakness Yes  Chills No Vision changes No  ENDOCRINE No unexplained hair loss No heat or cold intolerance    ALLERGY No hives  Sore throat No   RESP Coughing blood no  Shortness of breath YES   NEURO No Headache  Confusion Pain neck No   CARDIAC No Chest pain No Palpitations   GI No Pain abdomen NO   Vomiting NO     PHYSICAL EXAM    HEENT Unremarkable PERRLA atraumatic   RESP Fair air entry EXP prolonged    Harsh breath sound Resp distres mild   CARDIAC S1 S2 No S3     NO JVD    ABDOMEN SOFT BS PRESENT NOT DISTENDED No hepatosplenomegaly PEDAL EDEMA present No calf tenderness  NO rash   GENERAL Not TOXIC looking    PATIENT DESCRIPTION PATIENT COMMODORE TURNER ;  ADMISSION [facility] HOSPITALIST   79 y/o male pmhx HTN, PVD, DM, Asthma, COPD on 2L home O2, hx CABGx3, HLD presented to ED 1/6/2019  w/ SOB and increased work of breathing.     MEDS   12/16/2018  Breo 200   Incruse   Daliresp  Glipizide 2.5x2   metformin 1000.2   Lopressor 12.5x2   Valsart 80    Tamsulosin .4   Atorvastat 40   Plavix 75     PROBLEM SPECIFIC PATIENT DATA PATIENT COMMODORE TURNER 1/6/2019   ADMISSION [facility] HOSPITALIST    ACUTE COMBINED HYPOXIC HYPERCAPNIC RESP FAILURE   1/9/2019 25 740/58 106 15/5/.25   1/7/2019 .25/15/5 739/55/94   1/7/2019 Patient is in compensated resp acidosis and will continue to manage with prn bpap  RESP TRACT INFECTION  1/6-1/7/2019 W 12.3 - 5.8  1/6/2019 CXR no infiltrates   1/6 coronavirus   Azithr (1/6)   A/R Patient likely had copd  sec to coronavirus Check pc bc sero   COPD ex   Duoneb.6 (1/6)   Pulmicort (1/6)   solumed 40.3 (1/6) ch solu 20.3 (1/8)   A/R BD Steroids Improving Taper staroids in am   DM   A/R Sl scale   RO MI   1/6/2019 Tr 1 n  ASA 81 (1/6)   atorvastat 40 (1/6)   metoprolol 12.5x2 (1/6)   plavx 75 (1/6)   A/R No ongoing ischemia   CHF  10/26/2018 ECHO ef 55%   12/20/2017 ECHO  n lvsf diast dysfn ef 55%   Valsartan 80   A/R compensated     TIME SPENT Over 25 minutes aggregate care time spent on encounter; activities included   direct patient care, counseling and/or coordinating care reviewing notes, lab data/ imaging , discussion with multidisciplinary team/ patient  /family. Risks, benefits, alternatives  discussed in detail.

## 2019-01-10 VITALS — HEART RATE: 51 BPM | DIASTOLIC BLOOD PRESSURE: 71 MMHG | OXYGEN SATURATION: 91 % | SYSTOLIC BLOOD PRESSURE: 146 MMHG

## 2019-01-10 RX ORDER — AZITHROMYCIN 500 MG/1
1 TABLET, FILM COATED ORAL
Qty: 1 | Refills: 0 | OUTPATIENT
Start: 2019-01-10 | End: 2019-01-10

## 2019-02-10 ENCOUNTER — INPATIENT (INPATIENT)
Facility: HOSPITAL | Age: 79
LOS: 1 days | Discharge: HOME | DRG: 189 | End: 2019-02-12
Attending: FAMILY MEDICINE | Admitting: HOSPITALIST
Payer: COMMERCIAL

## 2019-02-10 VITALS
SYSTOLIC BLOOD PRESSURE: 119 MMHG | TEMPERATURE: 98 F | RESPIRATION RATE: 18 BRPM | HEIGHT: 71 IN | DIASTOLIC BLOOD PRESSURE: 69 MMHG | HEART RATE: 102 BPM | WEIGHT: 205.03 LBS | OXYGEN SATURATION: 96 %

## 2019-02-10 LAB
ALBUMIN SERPL ELPH-MCNC: 3.4 G/DL — SIGNIFICANT CHANGE UP (ref 3.3–5)
ALP SERPL-CCNC: 119 U/L — SIGNIFICANT CHANGE UP (ref 40–120)
ALT FLD-CCNC: 18 U/L — SIGNIFICANT CHANGE UP (ref 12–78)
ANION GAP SERPL CALC-SCNC: 6 MMOL/L — SIGNIFICANT CHANGE UP (ref 5–17)
APTT BLD: 38.3 SEC — HIGH (ref 27.5–36.3)
AST SERPL-CCNC: 13 U/L — LOW (ref 15–37)
BASE EXCESS BLDA CALC-SCNC: 4.6 MMOL/L — HIGH (ref -2–2)
BASOPHILS # BLD AUTO: 0.03 K/UL — SIGNIFICANT CHANGE UP (ref 0–0.2)
BASOPHILS NFR BLD AUTO: 0.4 % — SIGNIFICANT CHANGE UP (ref 0–2)
BILIRUB SERPL-MCNC: 0.3 MG/DL — SIGNIFICANT CHANGE UP (ref 0.2–1.2)
BLOOD GAS COMMENTS ARTERIAL: SIGNIFICANT CHANGE UP
BLOOD GAS COMMENTS ARTERIAL: SIGNIFICANT CHANGE UP
BUN SERPL-MCNC: 12 MG/DL — SIGNIFICANT CHANGE UP (ref 7–23)
CALCIUM SERPL-MCNC: 9.2 MG/DL — SIGNIFICANT CHANGE UP (ref 8.5–10.1)
CHLORIDE SERPL-SCNC: 103 MMOL/L — SIGNIFICANT CHANGE UP (ref 96–108)
CO2 SERPL-SCNC: 33 MMOL/L — HIGH (ref 22–31)
CREAT SERPL-MCNC: 1.2 MG/DL — SIGNIFICANT CHANGE UP (ref 0.5–1.3)
EOSINOPHIL # BLD AUTO: 0.41 K/UL — SIGNIFICANT CHANGE UP (ref 0–0.5)
EOSINOPHIL NFR BLD AUTO: 5.5 % — SIGNIFICANT CHANGE UP (ref 0–6)
GLUCOSE SERPL-MCNC: 149 MG/DL — HIGH (ref 70–99)
HCO3 BLDA-SCNC: 28 MMOL/L — HIGH (ref 23–27)
HCT VFR BLD CALC: 38.9 % — LOW (ref 39–50)
HGB BLD-MCNC: 12.1 G/DL — LOW (ref 13–17)
HOROWITZ INDEX BLDA+IHG-RTO: 48 — SIGNIFICANT CHANGE UP
IMM GRANULOCYTES NFR BLD AUTO: 0.7 % — SIGNIFICANT CHANGE UP (ref 0–1.5)
INR BLD: 0.98 RATIO — SIGNIFICANT CHANGE UP (ref 0.88–1.16)
LACTATE SERPL-SCNC: 2.4 MMOL/L — HIGH (ref 0.7–2)
LYMPHOCYTES # BLD AUTO: 1.59 K/UL — SIGNIFICANT CHANGE UP (ref 1–3.3)
LYMPHOCYTES # BLD AUTO: 21.5 % — SIGNIFICANT CHANGE UP (ref 13–44)
MCHC RBC-ENTMCNC: 27.8 PG — SIGNIFICANT CHANGE UP (ref 27–34)
MCHC RBC-ENTMCNC: 31.1 GM/DL — LOW (ref 32–36)
MCV RBC AUTO: 89.2 FL — SIGNIFICANT CHANGE UP (ref 80–100)
MONOCYTES # BLD AUTO: 0.8 K/UL — SIGNIFICANT CHANGE UP (ref 0–0.9)
MONOCYTES NFR BLD AUTO: 10.8 % — SIGNIFICANT CHANGE UP (ref 2–14)
NEUTROPHILS # BLD AUTO: 4.53 K/UL — SIGNIFICANT CHANGE UP (ref 1.8–7.4)
NEUTROPHILS NFR BLD AUTO: 61.1 % — SIGNIFICANT CHANGE UP (ref 43–77)
NRBC # BLD: 0 /100 WBCS — SIGNIFICANT CHANGE UP (ref 0–0)
PCO2 BLDA: 48 MMHG — HIGH (ref 32–46)
PH BLDA: 7.4 — SIGNIFICANT CHANGE UP (ref 7.35–7.45)
PLATELET # BLD AUTO: 315 K/UL — SIGNIFICANT CHANGE UP (ref 150–400)
PO2 BLDA: 155 MMHG — HIGH (ref 74–108)
POTASSIUM SERPL-MCNC: 4.2 MMOL/L — SIGNIFICANT CHANGE UP (ref 3.5–5.3)
POTASSIUM SERPL-SCNC: 4.2 MMOL/L — SIGNIFICANT CHANGE UP (ref 3.5–5.3)
PROT SERPL-MCNC: 7.1 G/DL — SIGNIFICANT CHANGE UP (ref 6–8.3)
PROTHROM AB SERPL-ACNC: 11.1 SEC — SIGNIFICANT CHANGE UP (ref 10–12.9)
RBC # BLD: 4.36 M/UL — SIGNIFICANT CHANGE UP (ref 4.2–5.8)
RBC # FLD: 12.9 % — SIGNIFICANT CHANGE UP (ref 10.3–14.5)
SAO2 % BLDA: 100 % — HIGH (ref 92–96)
SODIUM SERPL-SCNC: 142 MMOL/L — SIGNIFICANT CHANGE UP (ref 135–145)
WBC # BLD: 7.41 K/UL — SIGNIFICANT CHANGE UP (ref 3.8–10.5)
WBC # FLD AUTO: 7.41 K/UL — SIGNIFICANT CHANGE UP (ref 3.8–10.5)

## 2019-02-10 PROCEDURE — 71045 X-RAY EXAM CHEST 1 VIEW: CPT | Mod: 26

## 2019-02-10 PROCEDURE — 93010 ELECTROCARDIOGRAM REPORT: CPT

## 2019-02-10 PROCEDURE — 99285 EMERGENCY DEPT VISIT HI MDM: CPT

## 2019-02-10 RX ORDER — IPRATROPIUM/ALBUTEROL SULFATE 18-103MCG
3 AEROSOL WITH ADAPTER (GRAM) INHALATION ONCE
Qty: 0 | Refills: 0 | Status: COMPLETED | OUTPATIENT
Start: 2019-02-10 | End: 2019-02-10

## 2019-02-10 RX ORDER — AZITHROMYCIN 500 MG/1
500 TABLET, FILM COATED ORAL EVERY 24 HOURS
Qty: 0 | Refills: 0 | Status: DISCONTINUED | OUTPATIENT
Start: 2019-02-11 | End: 2019-02-12

## 2019-02-10 RX ORDER — AZITHROMYCIN 500 MG/1
500 TABLET, FILM COATED ORAL ONCE
Qty: 0 | Refills: 0 | Status: COMPLETED | OUTPATIENT
Start: 2019-02-10 | End: 2019-02-10

## 2019-02-10 RX ORDER — BUDESONIDE, MICRONIZED 100 %
0.5 POWDER (GRAM) MISCELLANEOUS
Qty: 0 | Refills: 0 | Status: DISCONTINUED | OUTPATIENT
Start: 2019-02-10 | End: 2019-02-12

## 2019-02-10 RX ORDER — AZITHROMYCIN 500 MG/1
TABLET, FILM COATED ORAL
Qty: 0 | Refills: 0 | Status: DISCONTINUED | OUTPATIENT
Start: 2019-02-10 | End: 2019-02-12

## 2019-02-10 RX ORDER — TIOTROPIUM BROMIDE 18 UG/1
1 CAPSULE ORAL; RESPIRATORY (INHALATION) DAILY
Qty: 0 | Refills: 0 | Status: DISCONTINUED | OUTPATIENT
Start: 2019-02-10 | End: 2019-02-12

## 2019-02-10 RX ORDER — ALBUTEROL 90 UG/1
2.5 AEROSOL, METERED ORAL EVERY 4 HOURS
Qty: 0 | Refills: 0 | Status: DISCONTINUED | OUTPATIENT
Start: 2019-02-10 | End: 2019-02-12

## 2019-02-10 RX ORDER — ENOXAPARIN SODIUM 100 MG/ML
40 INJECTION SUBCUTANEOUS DAILY
Qty: 0 | Refills: 0 | Status: DISCONTINUED | OUTPATIENT
Start: 2019-02-10 | End: 2019-02-12

## 2019-02-10 RX ADMIN — Medication 125 MILLIGRAM(S): at 22:58

## 2019-02-10 RX ADMIN — Medication 3 MILLILITER(S): at 22:57

## 2019-02-10 NOTE — ED PROVIDER NOTE - OBJECTIVE STATEMENT
pt referred by his pulm for sob, wheezing today. no fevers, chills, cough, cp, abd pain, d/n/v.  pmd - olga ([facility])  pulm - katie

## 2019-02-10 NOTE — ED ADULT TRIAGE NOTE - CHIEF COMPLAINT QUOTE
Pt reports shortness of breath since today, reports that he uses oxygen at home all the time. 95% on room air in triage. States he has had a mild cough. No other complaints at this time.

## 2019-02-10 NOTE — ED PROVIDER NOTE - CARDIAC, MLM
Normal rate, regular rhythm.  Heart sounds S1, S2 Normal rate, regular rhythm.  Heart sounds S1, S2. mild pitting edema b/l le's

## 2019-02-10 NOTE — ED ADULT NURSE NOTE - NSIMPLEMENTINTERV_GEN_ALL_ED
Implemented All Universal Safety Interventions:  [place] to call system. Call bell, personal items and telephone within reach. Instruct patient to call for assistance. Room bathroom lighting operational. Non-slip footwear when patient is off stretcher. Physically safe environment: no spills, clutter or unnecessary equipment. Stretcher in lowest position, wheels locked, appropriate side rails in place.

## 2019-02-10 NOTE — CONSULT NOTE ADULT - ASSESSMENT
COMMBRIANNE TURNER [facility]    ALLERGY Shellfish  CONTACT Sp Amira EATON    REASON FOR VISIT   Initial evaluation/Pulmonary consultation requested  2/10/2019 from Dr Dejesus by Dr Collins    Patient examined chart reviewed  HOSPITAL ADMISSION [facility] Hospitalist 2/10/2019  PATIENT CAME  FROM (if information available)      PAST MEDICAL & SURGICAL HISTORY:  PVD (peripheral vascular disease)  Hypertension  COPD (chronic obstructive pulmonary disease)  Osteomyelitis  Dyslipidemia  CAD (Coronary Artery Disease)  Diabetes Mellitus, Type II  CABG (Coronary Artery Bypass Graft)    HOSPITAL STAYS   12/21-12/24/2018  10/26-11/2/2018 6/11-6/14/2018 1/20-1/23/2018    PATIENT PROBLEMS   10/26-11/2/2018  ADMISSION [facility] DR DU FREEMAN    CARDIAC ARREST 10/26/2018   TARGETED TEMPERATURE THERAPY POST CAC 10/26/2018   INTUBATION MECHANICAL VENT 10/26/2018  IV SEDATIVE DRIP FOR MECHANICAL VENTILATION    COPD EX   HYPERGLYCEMIA REQUIRED INSULIN DRIO 10/26-10/28/2018   MERRILL NONOLIGURIC 10/26/2018     VITALS/LABS                           2/10/2019 W 7.4 Hb 12.1 Plt 315 INR .98 Na 142 K 4.2 CO2 33 Cr 1.2   2/10/2019 afeb 102 119/69 18 96%   1/9/2019 afeb 86 100/60 20 98%   1/9/2019 W 10 Hb 12 Plt 248 Na 139 K 4.8 CO2 38 Cr 1.2     REVIEW OF SYMPTOMS    Able to give ROS  Yes     RELIABLE No   CONSTITUTIONAL Weakness Yes  Chills No Vision changes No  ENDOCRINE No unexplained hair loss No heat or cold intolerance    ALLERGY No hives  Sore throat No   RESP Coughing blood no  Shortness of breath YES   NEURO No Headache  Confusion Pain neck No   CARDIAC No Chest pain No Palpitations   GI No Pain abdomen NO   Vomiting NO     PHYSICAL EXAM    HEENT Unremarkable PERRLA atraumatic   RESP Fair air entry EXP prolonged    Harsh breath sound Resp distres mild   CARDIAC S1 S2 No S3     NO JVD    ABDOMEN SOFT BS PRESENT NOT DISTENDED No hepatosplenomegaly PEDAL EDEMA present No calf tenderness  NO rash   GENERAL Not TOXIC looking    PATIENT DESCRIPTION PATIENT COMMODORE TURNER 2/10/2019  ADMISSION [facility] HOSPITALIST   77 y/o male pmhx HTN, PVD, DM, Asthma, COPD on 2L home O2, hx CABGx3, HLD presented to ED 2/10/2019  w/ SOB and increased work of breathing.     MEDS   Breo 200   Incruse   Daliresp  Glipizide 2.5x2   metformin 1000.2   Lopressor 12.5x2   Valsart 80    Tamsulosin .4   Atorvastat 40   Plavix 75     ASSESSMENT RECOMMENDATIONS PATIENT COMMODORE YUE 2/10/2019  ADMISSION [facility] HOSPITALIST   LACTICEMIA 2/10/2019 LA 2.4   RESP GAS EXCHANGE 2/10/2019 7 l neb 740/48/155   2/10/2019 Patient has early Type 2 resp failure and will benefit from bpap   RESP TRACT INFECTION 2/10/2019 Check procalcitonin bc flu rsv Start azithro   COPD EX BD steroids     TIME SPENT Over 55 minutes aggregate care time spent on encounter; activities included   direct patient care, counseling and/or coordinating care reviewing notes, lab data/ imaging , discussion with multidisciplinary team/ patient  /family. Risks, benefits, alternatives  discussed in detail.

## 2019-02-11 DIAGNOSIS — T14.8XXA OTHER INJURY OF UNSPECIFIED BODY REGION, INITIAL ENCOUNTER: Chronic | ICD-10-CM

## 2019-02-11 DIAGNOSIS — Z29.9 ENCOUNTER FOR PROPHYLACTIC MEASURES, UNSPECIFIED: ICD-10-CM

## 2019-02-11 DIAGNOSIS — I25.10 ATHEROSCLEROTIC HEART DISEASE OF NATIVE CORONARY ARTERY WITHOUT ANGINA PECTORIS: ICD-10-CM

## 2019-02-11 DIAGNOSIS — J44.1 CHRONIC OBSTRUCTIVE PULMONARY DISEASE WITH (ACUTE) EXACERBATION: ICD-10-CM

## 2019-02-11 DIAGNOSIS — N40.0 BENIGN PROSTATIC HYPERPLASIA WITHOUT LOWER URINARY TRACT SYMPTOMS: ICD-10-CM

## 2019-02-11 DIAGNOSIS — E78.5 HYPERLIPIDEMIA, UNSPECIFIED: ICD-10-CM

## 2019-02-11 DIAGNOSIS — I10 ESSENTIAL (PRIMARY) HYPERTENSION: ICD-10-CM

## 2019-02-11 DIAGNOSIS — E11.9 TYPE 2 DIABETES MELLITUS WITHOUT COMPLICATIONS: ICD-10-CM

## 2019-02-11 LAB
ALBUMIN SERPL ELPH-MCNC: 3.2 G/DL — LOW (ref 3.3–5)
ALP SERPL-CCNC: 124 U/L — HIGH (ref 40–120)
ALT FLD-CCNC: 19 U/L — SIGNIFICANT CHANGE UP (ref 12–78)
ANION GAP SERPL CALC-SCNC: 10 MMOL/L — SIGNIFICANT CHANGE UP (ref 5–17)
AST SERPL-CCNC: 12 U/L — LOW (ref 15–37)
BILIRUB DIRECT SERPL-MCNC: <.1 MG/DL — SIGNIFICANT CHANGE UP (ref 0.05–0.2)
BILIRUB INDIRECT FLD-MCNC: >0.2 MG/DL — SIGNIFICANT CHANGE UP (ref 0.2–1)
BILIRUB SERPL-MCNC: 0.3 MG/DL — SIGNIFICANT CHANGE UP (ref 0.2–1.2)
BUN SERPL-MCNC: 16 MG/DL — SIGNIFICANT CHANGE UP (ref 7–23)
CALCIUM SERPL-MCNC: 9.1 MG/DL — SIGNIFICANT CHANGE UP (ref 8.5–10.1)
CHLORIDE SERPL-SCNC: 102 MMOL/L — SIGNIFICANT CHANGE UP (ref 96–108)
CK SERPL-CCNC: 68 U/L — SIGNIFICANT CHANGE UP (ref 26–308)
CO2 SERPL-SCNC: 26 MMOL/L — SIGNIFICANT CHANGE UP (ref 22–31)
CREAT SERPL-MCNC: 1.4 MG/DL — HIGH (ref 0.5–1.3)
FLU A RESULT: SIGNIFICANT CHANGE UP
FLU A RESULT: SIGNIFICANT CHANGE UP
FLUAV AG NPH QL: SIGNIFICANT CHANGE UP
FLUBV AG NPH QL: SIGNIFICANT CHANGE UP
GLUCOSE SERPL-MCNC: 312 MG/DL — HIGH (ref 70–99)
HCT VFR BLD CALC: 39 % — SIGNIFICANT CHANGE UP (ref 39–50)
HGB BLD-MCNC: 12 G/DL — LOW (ref 13–17)
INR BLD: 1.06 RATIO — SIGNIFICANT CHANGE UP (ref 0.88–1.16)
LACTATE SERPL-SCNC: 2 MMOL/L — SIGNIFICANT CHANGE UP (ref 0.7–2)
MCHC RBC-ENTMCNC: 27.5 PG — SIGNIFICANT CHANGE UP (ref 27–34)
MCHC RBC-ENTMCNC: 30.8 GM/DL — LOW (ref 32–36)
MCV RBC AUTO: 89.4 FL — SIGNIFICANT CHANGE UP (ref 80–100)
NRBC # BLD: 0 /100 WBCS — SIGNIFICANT CHANGE UP (ref 0–0)
PHOSPHATE SERPL-MCNC: 2.6 MG/DL — SIGNIFICANT CHANGE UP (ref 2.5–4.5)
PLATELET # BLD AUTO: 324 K/UL — SIGNIFICANT CHANGE UP (ref 150–400)
POTASSIUM SERPL-MCNC: 5.2 MMOL/L — SIGNIFICANT CHANGE UP (ref 3.5–5.3)
POTASSIUM SERPL-SCNC: 5.2 MMOL/L — SIGNIFICANT CHANGE UP (ref 3.5–5.3)
PROCALCITONIN SERPL-MCNC: <0.05 NG/ML — HIGH (ref 0–0.04)
PROT SERPL-MCNC: 7.1 G/DL — SIGNIFICANT CHANGE UP (ref 6–8.3)
PROTHROM AB SERPL-ACNC: 12.1 SEC — SIGNIFICANT CHANGE UP (ref 10–12.9)
RBC # BLD: 4.36 M/UL — SIGNIFICANT CHANGE UP (ref 4.2–5.8)
RBC # FLD: 12.8 % — SIGNIFICANT CHANGE UP (ref 10.3–14.5)
RSV RESULT: SIGNIFICANT CHANGE UP
RSV RNA RESP QL NAA+PROBE: SIGNIFICANT CHANGE UP
SODIUM SERPL-SCNC: 138 MMOL/L — SIGNIFICANT CHANGE UP (ref 135–145)
TROPONIN I SERPL-MCNC: <.015 NG/ML — SIGNIFICANT CHANGE UP (ref 0.01–0.04)
WBC # BLD: 8.83 K/UL — SIGNIFICANT CHANGE UP (ref 3.8–10.5)
WBC # FLD AUTO: 8.83 K/UL — SIGNIFICANT CHANGE UP (ref 3.8–10.5)

## 2019-02-11 PROCEDURE — 12345: CPT | Mod: NC

## 2019-02-11 PROCEDURE — 99223 1ST HOSP IP/OBS HIGH 75: CPT | Mod: AI,GC

## 2019-02-11 RX ORDER — INSULIN LISPRO 100/ML
VIAL (ML) SUBCUTANEOUS
Qty: 0 | Refills: 0 | Status: DISCONTINUED | OUTPATIENT
Start: 2019-02-11 | End: 2019-02-12

## 2019-02-11 RX ORDER — ATORVASTATIN CALCIUM 80 MG/1
40 TABLET, FILM COATED ORAL AT BEDTIME
Qty: 0 | Refills: 0 | Status: DISCONTINUED | OUTPATIENT
Start: 2019-02-11 | End: 2019-02-12

## 2019-02-11 RX ORDER — GLUCAGON INJECTION, SOLUTION 0.5 MG/.1ML
1 INJECTION, SOLUTION SUBCUTANEOUS ONCE
Qty: 0 | Refills: 0 | Status: DISCONTINUED | OUTPATIENT
Start: 2019-02-11 | End: 2019-02-12

## 2019-02-11 RX ORDER — DEXTROSE 50 % IN WATER 50 %
12.5 SYRINGE (ML) INTRAVENOUS ONCE
Qty: 0 | Refills: 0 | Status: DISCONTINUED | OUTPATIENT
Start: 2019-02-11 | End: 2019-02-12

## 2019-02-11 RX ORDER — PANTOPRAZOLE SODIUM 20 MG/1
40 TABLET, DELAYED RELEASE ORAL
Qty: 0 | Refills: 0 | Status: DISCONTINUED | OUTPATIENT
Start: 2019-02-11 | End: 2019-02-12

## 2019-02-11 RX ORDER — METOPROLOL TARTRATE 50 MG
12.5 TABLET ORAL
Qty: 0 | Refills: 0 | Status: DISCONTINUED | OUTPATIENT
Start: 2019-02-11 | End: 2019-02-12

## 2019-02-11 RX ORDER — DEXTROSE 50 % IN WATER 50 %
15 SYRINGE (ML) INTRAVENOUS ONCE
Qty: 0 | Refills: 0 | Status: DISCONTINUED | OUTPATIENT
Start: 2019-02-11 | End: 2019-02-12

## 2019-02-11 RX ORDER — CLOPIDOGREL BISULFATE 75 MG/1
75 TABLET, FILM COATED ORAL DAILY
Qty: 0 | Refills: 0 | Status: DISCONTINUED | OUTPATIENT
Start: 2019-02-11 | End: 2019-02-12

## 2019-02-11 RX ORDER — TAMSULOSIN HYDROCHLORIDE 0.4 MG/1
0.4 CAPSULE ORAL AT BEDTIME
Qty: 0 | Refills: 0 | Status: DISCONTINUED | OUTPATIENT
Start: 2019-02-11 | End: 2019-02-12

## 2019-02-11 RX ORDER — ASCORBIC ACID 60 MG
500 TABLET,CHEWABLE ORAL DAILY
Qty: 0 | Refills: 0 | Status: DISCONTINUED | OUTPATIENT
Start: 2019-02-11 | End: 2019-02-12

## 2019-02-11 RX ORDER — SODIUM CHLORIDE 9 MG/ML
1000 INJECTION, SOLUTION INTRAVENOUS
Qty: 0 | Refills: 0 | Status: DISCONTINUED | OUTPATIENT
Start: 2019-02-11 | End: 2019-02-12

## 2019-02-11 RX ORDER — ASPIRIN/CALCIUM CARB/MAGNESIUM 324 MG
81 TABLET ORAL DAILY
Qty: 0 | Refills: 0 | Status: DISCONTINUED | OUTPATIENT
Start: 2019-02-11 | End: 2019-02-12

## 2019-02-11 RX ORDER — LOSARTAN POTASSIUM 100 MG/1
50 TABLET, FILM COATED ORAL DAILY
Qty: 0 | Refills: 0 | Status: DISCONTINUED | OUTPATIENT
Start: 2019-02-11 | End: 2019-02-12

## 2019-02-11 RX ADMIN — Medication 3: at 18:34

## 2019-02-11 RX ADMIN — Medication 0.5 MILLIGRAM(S): at 20:56

## 2019-02-11 RX ADMIN — Medication 1 TABLET(S): at 12:43

## 2019-02-11 RX ADMIN — Medication 81 MILLIGRAM(S): at 12:42

## 2019-02-11 RX ADMIN — AZITHROMYCIN 255 MILLIGRAM(S): 500 TABLET, FILM COATED ORAL at 23:02

## 2019-02-11 RX ADMIN — Medication 3: at 08:37

## 2019-02-11 RX ADMIN — Medication 40 MILLIGRAM(S): at 05:12

## 2019-02-11 RX ADMIN — Medication 12.5 MILLIGRAM(S): at 17:16

## 2019-02-11 RX ADMIN — TIOTROPIUM BROMIDE 1 CAPSULE(S): 18 CAPSULE ORAL; RESPIRATORY (INHALATION) at 05:12

## 2019-02-11 RX ADMIN — PANTOPRAZOLE SODIUM 40 MILLIGRAM(S): 20 TABLET, DELAYED RELEASE ORAL at 05:12

## 2019-02-11 RX ADMIN — Medication 12.5 MILLIGRAM(S): at 05:12

## 2019-02-11 RX ADMIN — TAMSULOSIN HYDROCHLORIDE 0.4 MILLIGRAM(S): 0.4 CAPSULE ORAL at 23:02

## 2019-02-11 RX ADMIN — ALBUTEROL 2.5 MILLIGRAM(S): 90 AEROSOL, METERED ORAL at 08:08

## 2019-02-11 RX ADMIN — AZITHROMYCIN 255 MILLIGRAM(S): 500 TABLET, FILM COATED ORAL at 00:32

## 2019-02-11 RX ADMIN — Medication 500 MILLIGRAM(S): at 12:43

## 2019-02-11 RX ADMIN — LOSARTAN POTASSIUM 50 MILLIGRAM(S): 100 TABLET, FILM COATED ORAL at 05:12

## 2019-02-11 RX ADMIN — Medication 3: at 12:39

## 2019-02-11 RX ADMIN — ALBUTEROL 2.5 MILLIGRAM(S): 90 AEROSOL, METERED ORAL at 01:53

## 2019-02-11 RX ADMIN — ENOXAPARIN SODIUM 40 MILLIGRAM(S): 100 INJECTION SUBCUTANEOUS at 12:42

## 2019-02-11 RX ADMIN — ALBUTEROL 2.5 MILLIGRAM(S): 90 AEROSOL, METERED ORAL at 20:56

## 2019-02-11 RX ADMIN — CLOPIDOGREL BISULFATE 75 MILLIGRAM(S): 75 TABLET, FILM COATED ORAL at 12:42

## 2019-02-11 RX ADMIN — ATORVASTATIN CALCIUM 40 MILLIGRAM(S): 80 TABLET, FILM COATED ORAL at 23:02

## 2019-02-11 RX ADMIN — ALBUTEROL 2.5 MILLIGRAM(S): 90 AEROSOL, METERED ORAL at 15:27

## 2019-02-11 RX ADMIN — Medication 0.5 MILLIGRAM(S): at 08:08

## 2019-02-11 RX ADMIN — Medication 40 MILLIGRAM(S): at 17:15

## 2019-02-11 RX ADMIN — ALBUTEROL 2.5 MILLIGRAM(S): 90 AEROSOL, METERED ORAL at 11:56

## 2019-02-11 RX ADMIN — Medication 4: at 23:02

## 2019-02-11 NOTE — PROGRESS NOTE ADULT - SUBJECTIVE AND OBJECTIVE BOX
Patient admitted earlier this AM, see admission H&P for more detail.    Briefly, a 77 yo male with PMHx of HTN, DM2 (on home Metformin and glipizide), COPD with chronic hypoxic/hypercapnic respiratory failure (on 2L/min O2 at home/ BIPAP at night), CABG, HLD, s/p recent intubation c/b cardiac arrest (12/2018), presenting to ED with SOB x1 day. Patient now s/p treatement with duonebs x2 and solumedrol 125mg IV x1. Flu negative. Feeling much better compared to time of presentation, and anxious to go home.       VITAL SIGNS:  Vital Signs Last 24 Hrs  T(C): 36.2 (02-11-19 @ 07:32), Max: 37.2 (02-11-19 @ 00:28)  T(F): 97.2 (02-11-19 @ 07:32), Max: 98.9 (02-11-19 @ 00:28)  HR: 76 (02-11-19 @ 08:10) (76 - 102)  BP: 130/60 (02-11-19 @ 07:32) (119/69 - 139/76)  BP(mean): --  RR: 16 (02-11-19 @ 07:32) (16 - 22)  SpO2: 100% (02-11-19 @ 08:10) (96% - 100%)      PHYSICAL EXAM:     GENERAL: Elderly male in no acute distress, resting comfortably in bed on BiPAP  HEENT: NC/AT, EOMI, neck supple, MMM  RESPIRATORY: LCTAB/L, no rhonchi, rales, or wheezing  CARDIOVASCULAR: RRR, no murmurs, gallops, rubs  ABDOMINAL: soft, non-tender, non-distended, positive bowel sounds   EXTREMITIES: no clubbing, cyanosis, or edema  NEUROLOGICAL: alert and oriented x 3, non-focal  SKIN: warm, dry, intact  MUSCULOSKELETAL: no gross joint deformity                          12.1   7.41  )-----------( 315      ( 10 Feb 2019 22:58 )             38.9     02-10    142  |  103  |  12  ----------------------------<  149<H>  4.2   |  33<H>  |  1.20    Ca    9.2      10 Feb 2019 22:58    TPro  7.1  /  Alb  3.4  /  TBili  0.3  /  DBili  x   /  AST  13<L>  /  ALT  18  /  AlkPhos  119  02-10    PT/INR - ( 10 Feb 2019 22:58 )   PT: 11.1 sec;   INR: 0.98 ratio         PTT - ( 10 Feb 2019 22:58 )  PTT:38.3 sec    MEDICATIONS  (STANDING):  ALBUTerol    0.083% 2.5 milliGRAM(s) Nebulizer every 4 hours  ascorbic acid 500 milliGRAM(s) Oral daily  aspirin enteric coated 81 milliGRAM(s) Oral daily  atorvastatin 40 milliGRAM(s) Oral at bedtime  azithromycin  IVPB      azithromycin  IVPB 500 milliGRAM(s) IV Intermittent every 24 hours  buDESOnide   0.5 milliGRAM(s) Respule 0.5 milliGRAM(s) Inhalation two times a day  clopidogrel Tablet 75 milliGRAM(s) Oral daily  dextrose 5%. 1000 milliLiter(s) (50 mL/Hr) IV Continuous <Continuous>  dextrose 50% Injectable 12.5 Gram(s) IV Push once  enoxaparin Injectable 40 milliGRAM(s) SubCutaneous daily  insulin lispro (HumaLOG) corrective regimen sliding scale   SubCutaneous Before meals and at bedtime  losartan 50 milliGRAM(s) Oral daily  methylPREDNISolone sodium succinate Injectable 40 milliGRAM(s) IV Push two times a day  metoprolol tartrate 12.5 milliGRAM(s) Oral two times a day  multivitamin 1 Tablet(s) Oral daily  pantoprazole    Tablet 40 milliGRAM(s) Oral before breakfast  tamsulosin 0.4 milliGRAM(s) Oral at bedtime  tiotropium 18 MICROgram(s) Capsule 1 Capsule(s) Inhalation daily    MEDICATIONS  (PRN):  dextrose 40% Gel 15 Gram(s) Oral once PRN Blood Glucose LESS THAN 70 milliGRAM(s)/deciliter  glucagon  Injectable 1 milliGRAM(s) IntraMuscular once PRN Glucose LESS THAN 70 milligrams/deciliter

## 2019-02-11 NOTE — H&P ADULT - NSHPOUTPATIENTPROVIDERS_GEN_ALL_CORE
PCP: Dr. Sarah Avila  Cardio: Dr. Rod  Pulm: Dr. Dejesus PCP: Dr. Sarah Avila  Cardio: Dr. Rod  Pulbriseyda: Dr. Dejesus   Pulbriseyda: Dr. Dejesus

## 2019-02-11 NOTE — PATIENT PROFILE ADULT - NSPROGENOTHERPROVIDER_GEN_A_NUR
medical specialist/Dr Sarah Avila primary  Dr Rod cardio  Dr Dejesus pulmonary dr medical specialist/Dr Sarah Avila primary [phone]  Dr Rod cardio  Dr Dejesus pulmonary dr

## 2019-02-11 NOTE — H&P ADULT - PROBLEM SELECTOR PLAN 2
HTN, chronic. Stable   - On home Valsartan 80mg, therapeutic interchange to Losartan 50mg daily.   - On home Metoprolol tartrate 12.5mg bid. will continue with hold parameters

## 2019-02-11 NOTE — H&P ADULT - HISTORY OF PRESENT ILLNESS
79 yo male with PMHx of HTN, DM2 (on home Metformin and glipizide), COPD (2L home/ BIPAP at night), CABG, HLD, hx hypercapnic resp failure with recent intubation c/b with cardiac arrest (11/2018), presenting to ED with SOB starting this morning. Wife at bedside providing additional history. Patient states he woke up this morning and felt more weak than usual and felt like "he couldn't catch his breath". Wife states that yesterday patient was able to perform all ADLs normally. He stated he stayed in bed all day and used his bipap and albuterol nebulizer x2 but did not feel better. His wife called pulmonologist, Dr. Dejesus, and was told to go to ED. Patient denied recent sicknesses, sick contacts, fevers, chills, chest pain, abdominal pain, n/v/d/c, urinary symptoms.     Of note, patient was recently admitted to [facility] for COPD exacerbation 1/2019.     In the ED, the patient's vital signs were T: 98.3, , /69, R: 18, SpO2 96% on 2L NC. CBC WNL. CMP WNL Lactate 2.4-->2.0. ABG significant for pCO2 48, pO2 155, pH 7.4, HCO3 28. CXR: On personal read appears unchanged from previous CXR on 1/2019. Patient was seen by Dr. Rutledge in ED. He received duonebs x2 and solumedrol 125mg IV x1. Flu, legionella, blood cultures drawn in ED. 77 yo male with PMHx of HTN, DM2 (on home Metformin and glipizide), COPD (2L home/ BIPAP at night), CABG, HLD, hx hypercapnic resp failure with recent intubation c/b with cardiac arrest (12/2018), presenting to ED with SOB starting this morning. Wife at bedside providing additional history. Patient states he woke up this morning and felt more weak than usual and felt like "he couldn't catch his breath". Wife states that yesterday patient was able to perform all ADLs normally. He stated he stayed in bed all day and used his bipap and albuterol nebulizer x2 but did not feel better. His wife called pulmonologist, Dr. Dejesus, and was told to go to ED. Patient denied recent sicknesses, sick contacts, fevers, chills, chest pain, abdominal pain, n/v/d/c, loss of appetite, urinary symptoms.     Of note, patient was recently admitted to [facility] for COPD exacerbation 1/2019.     In the ED, the patient's vital signs were T: 98.3, , /69, R: 18, SpO2 96% on 2L NC. CBC WNL. CMP WNL Lactate 2.4-->2.0. ABG significant for pCO2 48, pO2 155, pH 7.4, HCO3 28. CXR: On personal read appears unchanged from previous CXR on 1/2019. Patient was seen by Dr. Rutledge in ED. He received duonebs x2 and solumedrol 125mg IV x1. Flu, legionella, blood cultures drawn in ED. 79 yo male with PMHx of HTN, DM2 (on home Metformin and glipizide), COPD (2L home/ BIPAP at night), CABG, HLD, hx hypercapnic resp failure with recent intubation c/b with cardiac arrest (12/2018), presenting to ED with SOB starting this morning. Wife at bedside providing additional history. Patient states he woke up this morning and felt more weak than usual and felt like "he couldn't catch his breath". Wife states that yesterday patient was able to perform all ADLs normally. He stated he stayed in bed all day and used his bipap and albuterol nebulizer x2 but did not feel better. His wife called pulmonologist, Dr. Dejesus, and was told to go to ED. Patient denied recent sicknesses, sick contacts, fevers, chills, chest pain, abdominal pain, n/v/d/c, loss of appetite, urinary symptoms.     Of note, patient was recently admitted to [facility] for COPD exacerbation 1/2019.     In the ED, the patient's vital signs were T: 98.3, , /69, R: 18, SpO2 96% on 2L NC. CBC WNL. CMP WNL Lactate 2.0. ABG significant for pCO2 48, pO2 155, pH 7.4, HCO3 28. CXR: On personal read appears unchanged from previous CXR on 1/2019. Patient was seen by Dr. Rutledge in ED. He received duonebs x2 and solumedrol 125mg IV x1. Flu, legionella, blood cultures drawn in ED.

## 2019-02-11 NOTE — H&P ADULT - PROBLEM SELECTOR PLAN 7
Lovenox 40mg daily for dvt ppx   IMPROVE VTE Individual Risk Assessment          RISK                                                          Points  [  ] Previous VTE                                                3  [  ] Thrombophilia                                             2  [  ] Lower limb paralysis                                   2        (unable to hold up >15 seconds)    [  ] Current Cancer                                             2         (within 6 months)  [  ] Immobilization > 24 hrs                              1  [  ] ICU/CCU stay > 24 hours                             1  [x  ] Age > 60                                                         1    IMPROVE VTE Score: 1

## 2019-02-11 NOTE — H&P ADULT - PROBLEM SELECTOR PLAN 3
DMII, chronic on home Metformin and glipizide. Hold home meds  start Low dose ISS   Hypoglycemia protocol

## 2019-02-11 NOTE — H&P ADULT - ASSESSMENT
79 yo male with PMHx of HTN, DM2 (on home Metformin and glipizide), COPD (2L home/ BIPAP at night), CABG, HLD, hx hypercapnic resp failure with recent intubation c/b with cardiac arrest (12/2018), presenting to ED with SOB starting this morning, admitted for COPD exacerbation.

## 2019-02-11 NOTE — H&P ADULT - NSHPREVIEWOFSYSTEMS_GEN_ALL_CORE
CONSTITUTIONAL: denies fever, chills, fatigue, weakness  HEENT: denies blurred visions, sore throat  SKIN: denies new lesions, rash  CARDIOVASCULAR: denies chest pain, chest pressure, palpitations  RESPIRATORY: denies shortness of breath, sputum production  GASTROINTESTINAL: denies nausea, vomiting, diarrhea, abdominal pain  GENITOURINARY: denies dysuria, discharge  NEUROLOGICAL: denies numbness, headache, focal weakness  MUSCULOSKELETAL: denies new joint pain, muscle aches  HEMATOLOGIC: denies gross bleeding, bruising  LYMPHATICS: denies enlarged lymph nodes, extremity swelling  PSYCHIATRIC: denies recent changes in anxiety, depression  ENDOCRINOLOGIC: denies sweating, cold or heat intolerance CONSTITUTIONAL: admits to weakness denies fever, chills  HEENT: denies blurred visions, sore throat  SKIN: denies new lesions, rash  CARDIOVASCULAR: denies chest pain, chest pressure, palpitations  RESPIRATORY: admits to shortness of breath, denies cough, sputum production  GASTROINTESTINAL: denies nausea, vomiting, diarrhea, abdominal pain  GENITOURINARY: denies dysuria, discharge  NEUROLOGICAL: denies numbness, headache, focal weakness  MUSCULOSKELETAL: denies new joint pain, muscle aches  HEMATOLOGIC: denies gross bleeding, bruising  LYMPHATICS: admits to bilateral feet swelling, denies enlarged lymph nodes  PSYCHIATRIC: denies recent changes in anxiety, depression  ENDOCRINOLOGIC: denies sweating, cold or heat intolerance

## 2019-02-11 NOTE — PROGRESS NOTE ADULT - PROBLEM SELECTOR PLAN 1
COPD exacerbation, unclear trigger. Patient clinically stable.   Continue bipap qhs and Nasal cannula. Attempt to wean supplemental O2.   Dr. Dejesus (pul) following. Will discuss dropping dose of Solumedrol with Pulmonology. Pantoprazole 40mg daily, Azithromycin 500 q24 (day 1), Spiriva, pulmicort, albuerol q4hrs standing.
1-2 drinks

## 2019-02-11 NOTE — PATIENT PROFILE ADULT - VISION (WITH CORRECTIVE LENSES IF THE PATIENT USUALLY WEARS THEM):
Normal vision: sees adequately in most situations; can see medication labels, newsprint/wears glasses at the time

## 2019-02-11 NOTE — H&P ADULT - NSHPPHYSICALEXAM_GEN_ALL_CORE
T(C): 37.2 (02-11-19 @ 00:28), Max: 37.2 (02-11-19 @ 00:28)  HR: 94 (02-11-19 @ 00:28) (94 - 102)  BP: 139/76 (02-11-19 @ 00:28) (119/69 - 139/76)  RR: 22 (02-11-19 @ 00:28) (18 - 22)  SpO2: 99% (02-11-19 @ 00:28) (96% - 99%)    GENERAL:  male patient appears well, no acute distress, appropriate, pleasant  EYES: sclera clear, no exudates  ENMT: oropharynx clear without erythema, no exudates, moist mucous membranes, nasal cannula in place   NECK: supple, soft, no thyromegaly noted  LUNGS: mild wheezing noted in RLL and RML, no rhonchi appreciated  HEART: soft S1/S2, regular rate and rhythm, no murmurs noted, trace bilateral feet edema  GASTROINTESTINAL: abdomen is soft, nontender, nondistended, normoactive bowel sounds, no palpable masses  INTEGUMENT: good skin turgor, no lesions noted  MUSCULOSKELETAL: no clubbing or cyanosis, no obvious deformity  NEUROLOGIC: awake, alert, oriented x3, good muscle tone in 4 extremities, no obvious sensory deficits  PSYCHIATRIC: mood is good, affect is congruent, linear and logical thought process  HEME/LYMPH: no palpable supraclavicular nodules, no obvious ecchymosis or petechiae

## 2019-02-11 NOTE — ED ADULT NURSE REASSESSMENT NOTE - NS ED NURSE REASSESS COMMENT FT1
Patient had lunch . tolerated well.
Patient is off the BIPAP for break fast. Patient refused nasal canula while eating the breakfast. Patient is maintaining well. Patient is breathing normally. Tolerated break fast.
Received the patient in the Er at the time of change of shift. Patient is alert and oriented. Skin warm and dry. Patient is on BIPAP. Patient is breathing normally. No respiratory distress noticed. Patient is in a hospital bed. Pending for room availability.
1110 bipap place back on pt as per pt request. pt denies respiratory discomfort. marii gomez

## 2019-02-11 NOTE — H&P ADULT - PROBLEM SELECTOR PLAN 1
COPD exacerbation, unclear trigger. Patient clinically stable.   Admit to telemetry   Continue bipap qhs and Nasal cannula. Attempt to wean supplemental O2.   Dr. Dejesus (pul) following. Continue IV solumedrol 40mg bid, Pantoprazole 40mg daily, Azithromycin 500 q24 (day 1), Spiriva, pulmicort, albuerol q4hrs standing.

## 2019-02-11 NOTE — H&P ADULT - NSHPSOCIALHISTORY_GEN_ALL_CORE
Lives with wife  Ambulates independently   Former smoker (quit 25 years ago), smoked 1PPD for 20 years   Denies EtOH use   Received flu vaccine august 2018

## 2019-02-11 NOTE — PROGRESS NOTE ADULT - SUBJECTIVE AND OBJECTIVE BOX
Patient was examined Chart was reviewed Detailed assessment plan will be inserted in the space below after more data has been evaluated Meanwhile please refer to my previous note for Pulmonart assessment recommendations Patient was examined Chart was reviewed Detailed assessment plan will be inserted in the space below after more data has been evaluated Meanwhile please refer to my previous note for Pulmonart assessment recommendations           COMMBRIANNE TURNER [facility]    ALLERGY Shellfish  CONTACT Sp Amira C    REASON FOR VISIT Subsequent pulm fu   Initial evaluation/Pulmonary consultation requested  2/10/2019 from Dr Dejesus by Dr Collins    Patient examined chart reviewed  HOSPITAL ADMISSION [facility] Hospitalist 2/10/2019  PATIENT CAME  FROM (if information available)      VITALS/LABS                           2/11/2019 afeb 78 130/60 16 100%   2/11/2019 bpap 14/6/.25   2/10/2019 W 7.4 Hb 12.1 Plt 315 INR .98 Na 142 K 4.2 CO2 33 Cr 1.2     REVIEW OF SYMPTOMS    Able to give ROS  Yes     RELIABLE No   CONSTITUTIONAL Weakness Yes  Chills No Vision changes No  ENDOCRINE No unexplained hair loss No heat or cold intolerance    ALLERGY No hives  Sore throat No   RESP Coughing blood no  Shortness of breath YES   NEURO No Headache  Confusion Pain neck No   CARDIAC No Chest pain No Palpitations   GI No Pain abdomen NO   Vomiting NO     PHYSICAL EXAM    HEENT Unremarkable PERRLA atraumatic   RESP Fair air entry EXP prolonged    Harsh breath sound Resp distres mild   CARDIAC S1 S2 No S3     NO JVD    ABDOMEN SOFT BS PRESENT NOT DISTENDED No hepatosplenomegaly PEDAL EDEMA present No calf tenderness  NO rash   GENERAL Not TOXIC looking    GLOBAL ISSUE/BEST PRACTICE:      PROBLEM: HOB elevation:   y            PROBLEM: Stress ulcer proph:    protonix 40 (2/11)   PROBLEM: VTE prophylaxis:      lvnx 40 (2/11)   PROBLEM: Glycemic control:   iss (2/11)    PROBLEM: Nutrition:  cons carb (2/11)   PROBLEM: Advanced directive: na     PROBLEM: Allergies:  shellfish    PATIENT DESCRIPTION PATIENT COMMBRIANNE TURNER 2/10/2019  ADMISSION [facility] HOSPITALIST   77 y/o male pmhx HTN, PVD, DM, Asthma, COPD on 2L home O2, hx CABGx3, 10/26/2018 ECHO ef 55%  12/20/2017 ECHO  n lvsf diast dysfn ef 55% HLD presented to ED 2/10/2019  w/ SOB and increased work of breathing.     HOSPITAL COURSE PATIENT COMMBRIANNE TURNER 2/10/2019  ADMISSION [facility] HOSPITALIST   78 m admitted with COPD ex and resp failure on 2/10/2019   Rxed with niv bd steroids azithro 2/11/2019 procalcitoinin was n On 2/10 fluab and rsv were n     ASSESSMENT RECOMMENDATIONS PATIENT COMMODORE YUE 2/10/2019  ADMISSION [facility] HOSPITALIST   LACTICEMIA 2/10/2019 LA 2.4   RESP GAS EXCHANGE 2/10/2019 7 l neb 740/48/155   2/10/2019 Patient has early Type 2 resp failure and will benefit from bpap BPAP was started 2/10   RESP TRACT INFECTION 2/10/2019 Check  bc flu rsv Started azithro (2/11) for antiinflamm effect even though procalc was n   COPD EX BD steroids Albuterol.6 (2/10) pulmicort (2/11) solu 40.2 (2/10)   CAD ASA 81 (2/11) Plavix 75 (2/11) losartan 50 (2/11) Metoprolol 12.5x2   (Overall beta blockers improve outcomes in COPD patients so would avoid discontinuing bb)   BPH Tamsulosin .4 (2/11)     PLAN PATIENT COMMODORE YUE 2/10/2019  ADMISSION [facility] HOSPITALIST   Monitor gas exchange Continue bd steroids and his CAD meds DC planning in 24-48 h    TIME SPENT Over 25 minutes aggregate care time spent on encounter; activities included   direct patient care, counseling and/or coordinating care reviewing notes, lab data/ imaging , discussion with multidisciplinary team/ patient  /family. Risks, benefits, alternatives  discussed in detail.

## 2019-02-12 ENCOUNTER — TRANSCRIPTION ENCOUNTER (OUTPATIENT)
Age: 79
End: 2019-02-12

## 2019-02-12 VITALS — OXYGEN SATURATION: 96 %

## 2019-02-12 LAB
ALBUMIN SERPL ELPH-MCNC: 3 G/DL — LOW (ref 3.3–5)
ALP SERPL-CCNC: 111 U/L — SIGNIFICANT CHANGE UP (ref 40–120)
ALT FLD-CCNC: 16 U/L — SIGNIFICANT CHANGE UP (ref 12–78)
ANION GAP SERPL CALC-SCNC: 6 MMOL/L — SIGNIFICANT CHANGE UP (ref 5–17)
AST SERPL-CCNC: 8 U/L — LOW (ref 15–37)
BASE EXCESS BLDA CALC-SCNC: 4.7 MMOL/L — HIGH (ref -2–2)
BILIRUB SERPL-MCNC: 0.2 MG/DL — SIGNIFICANT CHANGE UP (ref 0.2–1.2)
BLOOD GAS COMMENTS ARTERIAL: SIGNIFICANT CHANGE UP
BLOOD GAS COMMENTS ARTERIAL: SIGNIFICANT CHANGE UP
BUN SERPL-MCNC: 24 MG/DL — HIGH (ref 7–23)
CALCIUM SERPL-MCNC: 9.4 MG/DL — SIGNIFICANT CHANGE UP (ref 8.5–10.1)
CHLORIDE SERPL-SCNC: 104 MMOL/L — SIGNIFICANT CHANGE UP (ref 96–108)
CO2 SERPL-SCNC: 32 MMOL/L — HIGH (ref 22–31)
CREAT SERPL-MCNC: 1.3 MG/DL — SIGNIFICANT CHANGE UP (ref 0.5–1.3)
GLUCOSE SERPL-MCNC: 300 MG/DL — HIGH (ref 70–99)
HCO3 BLDA-SCNC: 28 MMOL/L — HIGH (ref 23–27)
HCT VFR BLD CALC: 35.1 % — LOW (ref 39–50)
HGB BLD-MCNC: 10.9 G/DL — LOW (ref 13–17)
HOROWITZ INDEX BLDA+IHG-RTO: 21 — SIGNIFICANT CHANGE UP
INR BLD: 1.01 RATIO — SIGNIFICANT CHANGE UP (ref 0.88–1.16)
LACTATE SERPL-SCNC: 2.9 MMOL/L — HIGH (ref 0.7–2)
LACTATE SERPL-SCNC: 4.1 MMOL/L — CRITICAL HIGH (ref 0.7–2)
LACTATE SERPL-SCNC: 4.9 MMOL/L — CRITICAL HIGH (ref 0.7–2)
MCHC RBC-ENTMCNC: 27.9 PG — SIGNIFICANT CHANGE UP (ref 27–34)
MCHC RBC-ENTMCNC: 31.1 GM/DL — LOW (ref 32–36)
MCV RBC AUTO: 90 FL — SIGNIFICANT CHANGE UP (ref 80–100)
NRBC # BLD: 0 /100 WBCS — SIGNIFICANT CHANGE UP (ref 0–0)
PCO2 BLDA: 51 MMHG — HIGH (ref 32–46)
PH BLDA: 7.38 — SIGNIFICANT CHANGE UP (ref 7.35–7.45)
PHOSPHATE SERPL-MCNC: 2.8 MG/DL — SIGNIFICANT CHANGE UP (ref 2.5–4.5)
PLATELET # BLD AUTO: 338 K/UL — SIGNIFICANT CHANGE UP (ref 150–400)
PO2 BLDA: 72 MMHG — LOW (ref 74–108)
POTASSIUM SERPL-MCNC: 5 MMOL/L — SIGNIFICANT CHANGE UP (ref 3.5–5.3)
POTASSIUM SERPL-SCNC: 5 MMOL/L — SIGNIFICANT CHANGE UP (ref 3.5–5.3)
PROT SERPL-MCNC: 6.3 G/DL — SIGNIFICANT CHANGE UP (ref 6–8.3)
PROTHROM AB SERPL-ACNC: 11.4 SEC — SIGNIFICANT CHANGE UP (ref 10–12.9)
RBC # BLD: 3.9 M/UL — LOW (ref 4.2–5.8)
RBC # FLD: 12.7 % — SIGNIFICANT CHANGE UP (ref 10.3–14.5)
SAO2 % BLDA: 94 % — SIGNIFICANT CHANGE UP (ref 92–96)
SODIUM SERPL-SCNC: 142 MMOL/L — SIGNIFICANT CHANGE UP (ref 135–145)
WBC # BLD: 12.69 K/UL — HIGH (ref 3.8–10.5)
WBC # FLD AUTO: 12.69 K/UL — HIGH (ref 3.8–10.5)

## 2019-02-12 PROCEDURE — 87040 BLOOD CULTURE FOR BACTERIA: CPT

## 2019-02-12 PROCEDURE — 83036 HEMOGLOBIN GLYCOSYLATED A1C: CPT

## 2019-02-12 PROCEDURE — 82962 GLUCOSE BLOOD TEST: CPT

## 2019-02-12 PROCEDURE — 93005 ELECTROCARDIOGRAM TRACING: CPT

## 2019-02-12 PROCEDURE — 85610 PROTHROMBIN TIME: CPT

## 2019-02-12 PROCEDURE — 99285 EMERGENCY DEPT VISIT HI MDM: CPT | Mod: 25

## 2019-02-12 PROCEDURE — 36600 WITHDRAWAL OF ARTERIAL BLOOD: CPT

## 2019-02-12 PROCEDURE — 94640 AIRWAY INHALATION TREATMENT: CPT

## 2019-02-12 PROCEDURE — 82803 BLOOD GASES ANY COMBINATION: CPT

## 2019-02-12 PROCEDURE — 94760 N-INVAS EAR/PLS OXIMETRY 1: CPT

## 2019-02-12 PROCEDURE — 87631 RESP VIRUS 3-5 TARGETS: CPT

## 2019-02-12 PROCEDURE — 94660 CPAP INITIATION&MGMT: CPT

## 2019-02-12 PROCEDURE — 96374 THER/PROPH/DIAG INJ IV PUSH: CPT

## 2019-02-12 PROCEDURE — 84100 ASSAY OF PHOSPHORUS: CPT

## 2019-02-12 PROCEDURE — 99221 1ST HOSP IP/OBS SF/LOW 40: CPT

## 2019-02-12 PROCEDURE — 99239 HOSP IP/OBS DSCHRG MGMT >30: CPT

## 2019-02-12 PROCEDURE — 85027 COMPLETE CBC AUTOMATED: CPT

## 2019-02-12 PROCEDURE — 36415 COLL VENOUS BLD VENIPUNCTURE: CPT

## 2019-02-12 PROCEDURE — 84145 PROCALCITONIN (PCT): CPT

## 2019-02-12 PROCEDURE — 80053 COMPREHEN METABOLIC PANEL: CPT

## 2019-02-12 PROCEDURE — 82550 ASSAY OF CK (CPK): CPT

## 2019-02-12 PROCEDURE — 84484 ASSAY OF TROPONIN QUANT: CPT

## 2019-02-12 PROCEDURE — 85730 THROMBOPLASTIN TIME PARTIAL: CPT

## 2019-02-12 PROCEDURE — 71045 X-RAY EXAM CHEST 1 VIEW: CPT

## 2019-02-12 PROCEDURE — 82248 BILIRUBIN DIRECT: CPT

## 2019-02-12 PROCEDURE — 83605 ASSAY OF LACTIC ACID: CPT

## 2019-02-12 RX ORDER — AZITHROMYCIN 500 MG/1
1 TABLET, FILM COATED ORAL
Qty: 3 | Refills: 0 | OUTPATIENT
Start: 2019-02-12 | End: 2019-02-14

## 2019-02-12 RX ORDER — CLOPIDOGREL BISULFATE 75 MG/1
1 TABLET, FILM COATED ORAL
Qty: 0 | Refills: 0 | DISCHARGE
Start: 2019-02-12

## 2019-02-12 RX ORDER — ASPIRIN/CALCIUM CARB/MAGNESIUM 324 MG
1 TABLET ORAL
Qty: 0 | Refills: 0 | DISCHARGE
Start: 2019-02-12

## 2019-02-12 RX ORDER — FLUTICASONE FUROATE AND VILANTEROL TRIFENATATE 100; 25 UG/1; UG/1
1 POWDER RESPIRATORY (INHALATION)
Qty: 0 | Refills: 0 | COMMUNITY
Start: 2019-02-12

## 2019-02-12 RX ORDER — ROFLUMILAST 500 UG/1
1 TABLET ORAL
Qty: 0 | Refills: 0 | COMMUNITY

## 2019-02-12 RX ORDER — SODIUM CHLORIDE 9 MG/ML
1000 INJECTION INTRAMUSCULAR; INTRAVENOUS; SUBCUTANEOUS
Qty: 0 | Refills: 0 | Status: DISCONTINUED | OUTPATIENT
Start: 2019-02-12 | End: 2019-02-12

## 2019-02-12 RX ORDER — UMECLIDINIUM 62.5 UG/1
1 AEROSOL, POWDER ORAL
Qty: 0 | Refills: 0 | COMMUNITY
Start: 2019-02-12

## 2019-02-12 RX ORDER — VALSARTAN 80 MG/1
1 TABLET ORAL
Qty: 0 | Refills: 0 | DISCHARGE
Start: 2019-02-12

## 2019-02-12 RX ORDER — ALBUTEROL 90 UG/1
2 AEROSOL, METERED ORAL
Qty: 0 | Refills: 0 | COMMUNITY
Start: 2019-02-12

## 2019-02-12 RX ORDER — ASPIRIN/CALCIUM CARB/MAGNESIUM 324 MG
1 TABLET ORAL
Qty: 0 | Refills: 0 | COMMUNITY

## 2019-02-12 RX ORDER — VALSARTAN 80 MG/1
1 TABLET ORAL
Qty: 0 | Refills: 0 | COMMUNITY

## 2019-02-12 RX ORDER — ALBUTEROL 90 UG/1
2 AEROSOL, METERED ORAL
Qty: 0 | Refills: 0 | COMMUNITY

## 2019-02-12 RX ORDER — TAMSULOSIN HYDROCHLORIDE 0.4 MG/1
1 CAPSULE ORAL
Qty: 0 | Refills: 0 | DISCHARGE
Start: 2019-02-12

## 2019-02-12 RX ORDER — METOPROLOL TARTRATE 50 MG
0.5 TABLET ORAL
Qty: 0 | Refills: 0 | COMMUNITY
Start: 2019-02-12

## 2019-02-12 RX ORDER — ATORVASTATIN CALCIUM 80 MG/1
1 TABLET, FILM COATED ORAL
Qty: 0 | Refills: 0 | DISCHARGE
Start: 2019-02-12

## 2019-02-12 RX ORDER — FAMOTIDINE 10 MG/ML
1 INJECTION INTRAVENOUS
Qty: 14 | Refills: 0 | OUTPATIENT
Start: 2019-02-12 | End: 2019-02-18

## 2019-02-12 RX ORDER — ROFLUMILAST 500 UG/1
1 TABLET ORAL
Qty: 0 | Refills: 0 | COMMUNITY
Start: 2019-02-12

## 2019-02-12 RX ORDER — METFORMIN HYDROCHLORIDE 850 MG/1
1 TABLET ORAL
Qty: 0 | Refills: 0 | COMMUNITY

## 2019-02-12 RX ORDER — ASCORBIC ACID 60 MG
1 TABLET,CHEWABLE ORAL
Qty: 0 | Refills: 0 | COMMUNITY

## 2019-02-12 RX ORDER — FLUTICASONE FUROATE AND VILANTEROL TRIFENATATE 100; 25 UG/1; UG/1
1 POWDER RESPIRATORY (INHALATION)
Qty: 0 | Refills: 0 | COMMUNITY

## 2019-02-12 RX ORDER — ASCORBIC ACID 60 MG
1 TABLET,CHEWABLE ORAL
Qty: 0 | Refills: 0 | COMMUNITY
Start: 2019-02-12

## 2019-02-12 RX ORDER — UMECLIDINIUM 62.5 UG/1
1 AEROSOL, POWDER ORAL
Qty: 0 | Refills: 0 | COMMUNITY

## 2019-02-12 RX ADMIN — Medication 40 MILLIGRAM(S): at 06:20

## 2019-02-12 RX ADMIN — ALBUTEROL 2.5 MILLIGRAM(S): 90 AEROSOL, METERED ORAL at 00:23

## 2019-02-12 RX ADMIN — ALBUTEROL 2.5 MILLIGRAM(S): 90 AEROSOL, METERED ORAL at 03:44

## 2019-02-12 RX ADMIN — ALBUTEROL 2.5 MILLIGRAM(S): 90 AEROSOL, METERED ORAL at 12:21

## 2019-02-12 RX ADMIN — Medication 1: at 08:16

## 2019-02-12 RX ADMIN — Medication 1 TABLET(S): at 12:34

## 2019-02-12 RX ADMIN — Medication 40 MILLIGRAM(S): at 17:22

## 2019-02-12 RX ADMIN — Medication 4: at 12:33

## 2019-02-12 RX ADMIN — Medication 12.5 MILLIGRAM(S): at 17:22

## 2019-02-12 RX ADMIN — SODIUM CHLORIDE 500 MILLILITER(S): 9 INJECTION INTRAMUSCULAR; INTRAVENOUS; SUBCUTANEOUS at 13:38

## 2019-02-12 RX ADMIN — ENOXAPARIN SODIUM 40 MILLIGRAM(S): 100 INJECTION SUBCUTANEOUS at 12:34

## 2019-02-12 RX ADMIN — ALBUTEROL 2.5 MILLIGRAM(S): 90 AEROSOL, METERED ORAL at 08:10

## 2019-02-12 RX ADMIN — ALBUTEROL 2.5 MILLIGRAM(S): 90 AEROSOL, METERED ORAL at 15:51

## 2019-02-12 RX ADMIN — Medication 81 MILLIGRAM(S): at 12:34

## 2019-02-12 RX ADMIN — Medication 12.5 MILLIGRAM(S): at 06:23

## 2019-02-12 RX ADMIN — TIOTROPIUM BROMIDE 1 CAPSULE(S): 18 CAPSULE ORAL; RESPIRATORY (INHALATION) at 08:20

## 2019-02-12 RX ADMIN — CLOPIDOGREL BISULFATE 75 MILLIGRAM(S): 75 TABLET, FILM COATED ORAL at 12:34

## 2019-02-12 RX ADMIN — Medication 4: at 17:22

## 2019-02-12 RX ADMIN — Medication 500 MILLIGRAM(S): at 12:34

## 2019-02-12 RX ADMIN — PANTOPRAZOLE SODIUM 40 MILLIGRAM(S): 20 TABLET, DELAYED RELEASE ORAL at 06:19

## 2019-02-12 RX ADMIN — LOSARTAN POTASSIUM 50 MILLIGRAM(S): 100 TABLET, FILM COATED ORAL at 06:23

## 2019-02-12 RX ADMIN — Medication 0.5 MILLIGRAM(S): at 08:10

## 2019-02-12 NOTE — PROVIDER CONTACT NOTE (CRITICAL VALUE NOTIFICATION) - NAME OF MD/NP/PA/DO NOTIFIED:
Left message to MD Stephenson Left message to MD Stephenson, Md Hernández called and aware of lactate

## 2019-02-12 NOTE — PROGRESS NOTE ADULT - SUBJECTIVE AND OBJECTIVE BOX
COMMODORE TURNER [facility] P    ALLERGY Shellfish  CONTACT Sp Amira EATON    REASON FOR VISIT Subsequent pulm fu   Initial evaluation/Pulmonary consultation requested  2/10/2019 from Dr Dejesus by Dr Collins    Patient examined chart reviewed  HOSPITAL ADMISSION [facility] Hospitalist 2/10/2019  PATIENT CAME  FROM (if information available)      VITALS/LABS                           2/12/2019 afeb 73 118/71 19 99%   2/12/2019 3a 14/9/.25   2/12/2019 w 12.6 Hb 10.9 Plt 338 INR 1 Na 142 K 5 CO2 32 Cr 1.3   2/12/2019 /51/72   2/12 la 4.1-4.9     REVIEW OF SYMPTOMS    Able to give ROS  Yes     RELIABLE No   CONSTITUTIONAL Weakness Yes  Chills No Vision changes No  ENDOCRINE No unexplained hair loss No heat or cold intolerance    ALLERGY No hives  Sore throat No   RESP Coughing blood no  Shortness of breath YES   NEURO No Headache  Confusion Pain neck No   CARDIAC No Chest pain No Palpitations   GI No Pain abdomen NO   Vomiting NO     PHYSICAL EXAM    HEENT Unremarkable PERRLA atraumatic   RESP Fair air entry EXP prolonged    Harsh breath sound Resp distres mild   CARDIAC S1 S2 No S3     NO JVD    ABDOMEN SOFT BS PRESENT NOT DISTENDED No hepatosplenomegaly PEDAL EDEMA present No calf tenderness  NO rash   GENERAL Not TOXIC looking    GLOBAL ISSUE/BEST PRACTICE:      PROBLEM: HOB elevation:   y            PROBLEM: Stress ulcer proph:    protonix 40 (2/11)   PROBLEM: VTE prophylaxis:      lvnx 40 (2/11)   PROBLEM: Glycemic control:   iss (2/11)    PROBLEM: Nutrition:  cons carb (2/11)   PROBLEM: Advanced directive: na     PROBLEM: Allergies:  shellfish    HOSPITAL COURSE PATIENT COMMBRIANNE TURNER 2/10/2019  ADMISSION [facility] HOSPITALIST   78 m admitted with COPD ex and resp failure on 2/10/2019   Rxed with niv bd steroids azithro 2/11/2019 procalcitoinin was n On 2/10 fluab and rsv were n   2/12/2019 Gas exchange is better 2/12/2019 /51/72   2/12/2019 Lacticemia noted 2/12 la 4.1-4.9  Poss sec work of breathing ba agonists 2/11 b c n Sepsis seemed less likely as cause Given ivf     ASSESSMENT RECOMMENDATIONS PATIENT COMMBRIANNE TURNER 2/10/2019  ADMISSION [facility] HOSPITALIST   LACTICEMIA 2/10/2019 LA 2.4 2/12 la 4.1-4.9 Given fc on 2/12/2019 Lacticemia is likely not sec to sepsis given the neg b c and clinical improvement  It is likely sec to wob and B agonists   RESP GAS EXCHANGE 2/10/2019 7 l neb 740/48/155 2/12/2019 /51/72  BPAP was started 2/10 Gas exchange has remained acceptable this admission   RESP TRACT INFECTION 2/10/2019 b c fluab rsv were n Started azithro (2/11) even though procalc was n  for antiinflamm effect  COPD EX BD steroids Albuterol.6 (2/10) pulmicort (2/11) solu 40.2 (2/10) To dc on Pred 30x 5 d   CAD ASA 81 (2/11) Plavix 75 (2/11) losartan 50 (2/11) Metoprolol 12.5x2   (Overall beta blockers improve outcomes in COPD patients so would avoid discontinuing bb)   BPH Tamsulosin .4 (2/11)     PLAN PATIENT COMMODORE YUE 2/10/2019  ADMISSION [facility] HOSPITALIST   Monitor gas exchange Continue bd steroids and his CAD meds DC planning  once lacticemia trending down     TIME SPENT Over 25 minutes aggregate care time spent on encounter; activities included   direct patient care, counseling and/or coordinating care reviewing notes, lab data/ imaging , discussion with multidisciplinary team/ patient  /family. Risks, benefits, alternatives  discussed in detail.

## 2019-02-12 NOTE — DISCHARGE NOTE ADULT - CARE PLAN
Principal Discharge DX:	COPD exacerbation  Goal:	no further episodes  Assessment and plan of treatment:	Complete course of antibiotics and steroids as prescribed. Take Pepcid while on your steroids to protect your stomach.  Continue Oxygen during day and BiPAP overnight  Secondary Diagnosis:	Diabetes Mellitus, Type II  Assessment and plan of treatment:	Continue Glipizide per your home regimen.  Follow up with your PCP.  Hold your Metformin x 3 days and then resume, as you had elevated lactic acid, which Metformin can worsen. Follow up with your PCP to discuss continuing this medication long term.  Secondary Diagnosis:	Dyslipidemia  Assessment and plan of treatment:	Resume home meds.  Follow up with PCP.  Secondary Diagnosis:	Hypertension  Assessment and plan of treatment:	Resume home meds.  Follow up with PCP.  Secondary Diagnosis:	BPH (benign prostatic hyperplasia)  Assessment and plan of treatment:	Resume home meds.  Follow up with urology.  Secondary Diagnosis:	CAD (Coronary Artery Disease)  Assessment and plan of treatment:	Resume home meds.  Follow up with PCP/Cardio.

## 2019-02-12 NOTE — DISCHARGE NOTE ADULT - PLAN OF CARE
no further episodes Complete course of antibiotics and steroids as prescribed. Take Pepcid while on your steroids to protect your stomach.  Continue Oxygen during day and BiPAP overnight Continue Glipizide per your home regimen.  Follow up with your PCP.  Hold your Metformin x 3 days and then resume, as you had elevated lactic acid, which Metformin can worsen. Follow up with your PCP to discuss continuing this medication long term. Resume home meds.  Follow up with PCP. Resume home meds.  Follow up with urology. Resume home meds.  Follow up with PCP/Cardio.

## 2019-02-12 NOTE — DISCHARGE NOTE ADULT - SECONDARY DIAGNOSIS.
Diabetes Mellitus, Type II Dyslipidemia Hypertension BPH (benign prostatic hyperplasia) CAD (Coronary Artery Disease)

## 2019-02-12 NOTE — PROGRESS NOTE ADULT - SUBJECTIVE AND OBJECTIVE BOX
Patient was examined Chart reviewed Detailed note will be inserted ion the space below after going over more patient data later today Meanwhile please refer to my prior note(s) for Pulmonary assessment and recommendations

## 2019-02-12 NOTE — DISCHARGE NOTE ADULT - MEDICATION SUMMARY - MEDICATIONS TO TAKE
I will START or STAY ON the medications listed below when I get home from the hospital:    predniSONE 10 mg oral tablet  -- 3 tab(s) by mouth once a day   -- It is very important that you take or use this exactly as directed.  Do not skip doses or discontinue unless directed by your doctor.  Obtain medical advice before taking any non-prescription drugs as some may affect the action of this medication.  Take with food or milk.    -- Indication: For COPD exacerbation    aspirin 81 mg oral delayed release tablet  -- 1 tab(s) by mouth once a day  -- Indication: For prevention    valsartan 80 mg oral tablet  -- 1 tab(s) by mouth once a day  -- Indication: For Htn    tamsulosin 0.4 mg oral capsule  -- 1 cap(s) by mouth once a day (at bedtime)  -- Indication: For BPH (benign prostatic hyperplasia)    glipiZIDE 2.5 mg oral tablet, extended release  -- 1 tab(s) by mouth once a day  -- Indication: For Diabetes Mellitus, Type II    atorvastatin 40 mg oral tablet  -- 1 tab(s) by mouth once a day  -- Indication: For Hld    clopidogrel 75 mg oral tablet  -- 1 tab(s) by mouth once a day  -- Indication: For CAD (Coronary Artery Disease)    metoprolol tartrate 25 mg oral tablet  -- 0.5 tab(s) by mouth 2 times a day  -- Indication: For Htn    Breo Ellipta 200 mcg-25 mcg/inh inhalation powder  -- 1 puff(s) inhaled once a day  -- Indication: For COPD exacerbation    Incruse Ellipta 62.5 mcg/inh inhalation powder  -- 1 puff(s) inhaled every 24 hours  -- Indication: For COPD exacerbation    albuterol 90 mcg/inh inhalation aerosol  -- 2 puff(s) inhaled 4 times a day, As Needed  -- Indication: For COPD exacerbation    Pepcid 20 mg oral tablet  -- 1 tab(s) by mouth 2 times a day   -- It is very important that you take or use this exactly as directed.  Do not skip doses or discontinue unless directed by your doctor.  Obtain medical advice before taking any non-prescription drugs as some may affect the action of this medication.    -- Indication: For Need for prophylactic measure (while on steroids)    Zithromax 500 mg oral tablet  -- 1 tab(s) by mouth once a day   -- Do not take dairy products, antacids, or iron preparations within one hour of this medication.  Finish all this medication unless otherwise directed by prescriber.    -- Indication: For COPD exacerbation    Daliresp 500 mcg oral tablet  -- 1 tab(s) by mouth once a day  -- Indication: For COPD exacerbation    Multiple Vitamins oral tablet  -- 1 tab(s) by mouth once a day  -- Indication: For supplement    ascorbic acid 500 mg oral tablet  -- 1 tab(s) by mouth once a day  -- Indication: For supplement

## 2019-02-12 NOTE — DISCHARGE NOTE ADULT - HOSPITAL COURSE
79 yo male with PMHx of HTN, DM2 (on home Metformin and glipizide), COPD (2L home/ BIPAP at night), CABG, HLD, hx hypercapnic resp failure with recent intubation c/b with cardiac arrest (12/2018), presenting to ED with SOB starting the morning of presentation to ED. Patient staid in bed all day and used his bipap and albuterol nebulizer x2 but did not feel better. His wife called pulmonologist, Dr. Dejesus, and was told to go to ED. Patient denied recent sicknesses, sick contacts, fevers, chills, chest pain, abdominal pain, n/v/d/c, loss of appetite, urinary symptoms.     In the ED, the patient's vital signs were T: 98.3, , /69, R: 18, SpO2 96% on 2L NC. CBC WNL. CMP WNL Lactate 2.0. ABG significant for pCO2 48, pO2 155, pH 7.4, HCO3 28.     Patient admitted for acute on chronic hypoxic/hypercarbic respiratory failure due to COPD exacerbation. He was treated with Azithromycin, Solumedrol, inhalers, and nebulizer treatments. He was maintained on HS BiPAP. His symptoms improved drastically. On day of discharge, patient ambulated in more and maintained saturation of 92% without supplemental oxygen.     He did have lactic acidosis which was attributed to his respiratory failure and his use of Metformin. It will be held at time of admission temporarily, and patient was advised to discuss further treatment with Metformin with his PCP. Patient felt well and requested discharge home and outpatient follow-up.       Day of discharge:    VITAL SIGNS:  Vital Signs Last 24 Hrs  T(C): 36.8 (02-12-19 @ 15:38), Max: 36.9 (02-11-19 @ 19:32)  T(F): 98.3 (02-12-19 @ 15:38), Max: 98.4 (02-11-19 @ 19:32)  HR: 73 (02-12-19 @ 15:52) (67 - 122)  BP: 109/70 (02-12-19 @ 15:38) (106/56 - 134/78)  BP(mean): --  RR: 19 (02-12-19 @ 15:38) (16 - 19)  SpO2: 96% (02-12-19 @ 15:52) (92% - 100%)      PHYSICAL EXAM:     GENERAL: no acute distress  HEENT: NC/AT, EOMI, neck supple, MMM  RESPIRATORY: decreased breath sounds, no rhonchi, rales, or wheezing  CARDIOVASCULAR: RRR, no murmurs, gallops, rubs  ABDOMINAL: soft, non-tender, non-distended, positive bowel sounds   EXTREMITIES: no clubbing, cyanosis, or edema  NEUROLOGICAL: alert and oriented x 3, non-focal  SKIN: warm, dry, intact  MUSCULOSKELETAL: no gross joint deformity    Patient stable for discharge home and outpatient follow-up.     Time spent: 45 minutes

## 2019-02-12 NOTE — DISCHARGE NOTE ADULT - CARE PROVIDER_API CALL
Sarah Avila)  Medicine  [address] [place] [address]  Phone: [phone]  Fax: [phone]  Follow Up Time:     Arun Dejesus)  Critical Care Medicine; Internal Medicine; Pulmonary Disease  [address] [place] [address]  Phone: [phone]  Fax: [phone]  Follow Up Time:

## 2019-02-23 ENCOUNTER — INPATIENT (INPATIENT)
Facility: HOSPITAL | Age: 79
LOS: 2 days | Discharge: HOME | DRG: 193 | End: 2019-02-26
Attending: INTERNAL MEDICINE | Admitting: HOSPITALIST
Payer: COMMERCIAL

## 2019-02-23 VITALS
DIASTOLIC BLOOD PRESSURE: 102 MMHG | WEIGHT: 205.03 LBS | RESPIRATION RATE: 18 BRPM | TEMPERATURE: 98 F | SYSTOLIC BLOOD PRESSURE: 156 MMHG | HEART RATE: 192 BPM | OXYGEN SATURATION: 97 %

## 2019-02-23 DIAGNOSIS — J96.02 ACUTE RESPIRATORY FAILURE WITH HYPERCAPNIA: ICD-10-CM

## 2019-02-23 DIAGNOSIS — I48.91 UNSPECIFIED ATRIAL FIBRILLATION: ICD-10-CM

## 2019-02-23 DIAGNOSIS — I25.10 ATHEROSCLEROTIC HEART DISEASE OF NATIVE CORONARY ARTERY WITHOUT ANGINA PECTORIS: ICD-10-CM

## 2019-02-23 DIAGNOSIS — I10 ESSENTIAL (PRIMARY) HYPERTENSION: ICD-10-CM

## 2019-02-23 DIAGNOSIS — E11.9 TYPE 2 DIABETES MELLITUS WITHOUT COMPLICATIONS: ICD-10-CM

## 2019-02-23 DIAGNOSIS — R00.0 TACHYCARDIA, UNSPECIFIED: ICD-10-CM

## 2019-02-23 DIAGNOSIS — Z29.9 ENCOUNTER FOR PROPHYLACTIC MEASURES, UNSPECIFIED: ICD-10-CM

## 2019-02-23 DIAGNOSIS — N40.0 BENIGN PROSTATIC HYPERPLASIA WITHOUT LOWER URINARY TRACT SYMPTOMS: ICD-10-CM

## 2019-02-23 DIAGNOSIS — E78.5 HYPERLIPIDEMIA, UNSPECIFIED: ICD-10-CM

## 2019-02-23 DIAGNOSIS — T14.8XXA OTHER INJURY OF UNSPECIFIED BODY REGION, INITIAL ENCOUNTER: Chronic | ICD-10-CM

## 2019-02-23 DIAGNOSIS — J44.1 CHRONIC OBSTRUCTIVE PULMONARY DISEASE WITH (ACUTE) EXACERBATION: ICD-10-CM

## 2019-02-23 LAB
ALBUMIN SERPL ELPH-MCNC: 3.8 G/DL — SIGNIFICANT CHANGE UP (ref 3.3–5)
ALP SERPL-CCNC: 135 U/L — HIGH (ref 40–120)
ALT FLD-CCNC: 51 U/L — SIGNIFICANT CHANGE UP (ref 12–78)
ANION GAP SERPL CALC-SCNC: 7 MMOL/L — SIGNIFICANT CHANGE UP (ref 5–17)
APTT BLD: 33.7 SEC — SIGNIFICANT CHANGE UP (ref 28.5–37)
AST SERPL-CCNC: 28 U/L — SIGNIFICANT CHANGE UP (ref 15–37)
BASE EXCESS BLDA CALC-SCNC: 2.2 MMOL/L — HIGH (ref -2–2)
BASE EXCESS BLDA CALC-SCNC: 3.3 MMOL/L — HIGH (ref -2–2)
BASOPHILS # BLD AUTO: 0.04 K/UL — SIGNIFICANT CHANGE UP (ref 0–0.2)
BASOPHILS NFR BLD AUTO: 0.4 % — SIGNIFICANT CHANGE UP (ref 0–2)
BILIRUB SERPL-MCNC: 0.3 MG/DL — SIGNIFICANT CHANGE UP (ref 0.2–1.2)
BLOOD GAS COMMENTS ARTERIAL: SIGNIFICANT CHANGE UP
BLOOD GAS COMMENTS ARTERIAL: SIGNIFICANT CHANGE UP
BUN SERPL-MCNC: 13 MG/DL — SIGNIFICANT CHANGE UP (ref 7–23)
CALCIUM SERPL-MCNC: 9.5 MG/DL — SIGNIFICANT CHANGE UP (ref 8.5–10.1)
CHLORIDE SERPL-SCNC: 102 MMOL/L — SIGNIFICANT CHANGE UP (ref 96–108)
CK SERPL-CCNC: 116 U/L — SIGNIFICANT CHANGE UP (ref 26–308)
CO2 SERPL-SCNC: 31 MMOL/L — SIGNIFICANT CHANGE UP (ref 22–31)
CREAT SERPL-MCNC: 1.3 MG/DL — SIGNIFICANT CHANGE UP (ref 0.5–1.3)
EOSINOPHIL # BLD AUTO: 0.66 K/UL — HIGH (ref 0–0.5)
EOSINOPHIL NFR BLD AUTO: 6.3 % — HIGH (ref 0–6)
FLU A RESULT: SIGNIFICANT CHANGE UP
FLU A RESULT: SIGNIFICANT CHANGE UP
FLUAV AG NPH QL: SIGNIFICANT CHANGE UP
FLUBV AG NPH QL: SIGNIFICANT CHANGE UP
GLUCOSE SERPL-MCNC: 218 MG/DL — HIGH (ref 70–99)
HCO3 BLDA-SCNC: 25 MMOL/L — SIGNIFICANT CHANGE UP (ref 23–27)
HCO3 BLDA-SCNC: 25 MMOL/L — SIGNIFICANT CHANGE UP (ref 23–27)
HCT VFR BLD CALC: 45.8 % — SIGNIFICANT CHANGE UP (ref 39–50)
HGB BLD-MCNC: 13.8 G/DL — SIGNIFICANT CHANGE UP (ref 13–17)
HOROWITZ INDEX BLDA+IHG-RTO: 24 — SIGNIFICANT CHANGE UP
HOROWITZ INDEX BLDA+IHG-RTO: 28 — SIGNIFICANT CHANGE UP
IMM GRANULOCYTES NFR BLD AUTO: 0.6 % — SIGNIFICANT CHANGE UP (ref 0–1.5)
INR BLD: 0.92 RATIO — SIGNIFICANT CHANGE UP (ref 0.88–1.16)
LACTATE SERPL-SCNC: 1 MMOL/L — SIGNIFICANT CHANGE UP (ref 0.7–2)
LYMPHOCYTES # BLD AUTO: 1.55 K/UL — SIGNIFICANT CHANGE UP (ref 1–3.3)
LYMPHOCYTES # BLD AUTO: 14.8 % — SIGNIFICANT CHANGE UP (ref 13–44)
MCHC RBC-ENTMCNC: 27.3 PG — SIGNIFICANT CHANGE UP (ref 27–34)
MCHC RBC-ENTMCNC: 30.1 GM/DL — LOW (ref 32–36)
MCV RBC AUTO: 90.5 FL — SIGNIFICANT CHANGE UP (ref 80–100)
MONOCYTES # BLD AUTO: 1.09 K/UL — HIGH (ref 0–0.9)
MONOCYTES NFR BLD AUTO: 10.4 % — SIGNIFICANT CHANGE UP (ref 2–14)
NEUTROPHILS # BLD AUTO: 7.06 K/UL — SIGNIFICANT CHANGE UP (ref 1.8–7.4)
NEUTROPHILS NFR BLD AUTO: 67.5 % — SIGNIFICANT CHANGE UP (ref 43–77)
NRBC # BLD: 0 /100 WBCS — SIGNIFICANT CHANGE UP (ref 0–0)
NT-PROBNP SERPL-SCNC: 175 PG/ML — SIGNIFICANT CHANGE UP (ref 0–450)
PCO2 BLDA: 63 MMHG — HIGH (ref 32–46)
PCO2 BLDA: 76 MMHG — CRITICAL HIGH (ref 32–46)
PH BLDA: 7.22 — LOW (ref 7.35–7.45)
PH BLDA: 7.28 — LOW (ref 7.35–7.45)
PLATELET # BLD AUTO: 346 K/UL — SIGNIFICANT CHANGE UP (ref 150–400)
PO2 BLDA: 147 MMHG — HIGH (ref 74–108)
PO2 BLDA: 68 MMHG — LOW (ref 74–108)
POTASSIUM SERPL-MCNC: 5 MMOL/L — SIGNIFICANT CHANGE UP (ref 3.5–5.3)
POTASSIUM SERPL-SCNC: 5 MMOL/L — SIGNIFICANT CHANGE UP (ref 3.5–5.3)
PROT SERPL-MCNC: 7.5 G/DL — SIGNIFICANT CHANGE UP (ref 6–8.3)
PROTHROM AB SERPL-ACNC: 10.5 SEC — SIGNIFICANT CHANGE UP (ref 10–12.9)
RBC # BLD: 5.06 M/UL — SIGNIFICANT CHANGE UP (ref 4.2–5.8)
RBC # FLD: 13.4 % — SIGNIFICANT CHANGE UP (ref 10.3–14.5)
RSV RESULT: SIGNIFICANT CHANGE UP
RSV RNA RESP QL NAA+PROBE: SIGNIFICANT CHANGE UP
SAO2 % BLDA: 91 % — LOW (ref 92–96)
SAO2 % BLDA: 99 % — HIGH (ref 92–96)
SODIUM SERPL-SCNC: 140 MMOL/L — SIGNIFICANT CHANGE UP (ref 135–145)
TROPONIN I SERPL-MCNC: <.015 NG/ML — SIGNIFICANT CHANGE UP (ref 0.01–0.04)
WBC # BLD: 10.46 K/UL — SIGNIFICANT CHANGE UP (ref 3.8–10.5)
WBC # FLD AUTO: 10.46 K/UL — SIGNIFICANT CHANGE UP (ref 3.8–10.5)

## 2019-02-23 PROCEDURE — 93970 EXTREMITY STUDY: CPT | Mod: 26

## 2019-02-23 PROCEDURE — 99291 CRITICAL CARE FIRST HOUR: CPT

## 2019-02-23 PROCEDURE — 99223 1ST HOSP IP/OBS HIGH 75: CPT

## 2019-02-23 PROCEDURE — 71045 X-RAY EXAM CHEST 1 VIEW: CPT | Mod: 26

## 2019-02-23 PROCEDURE — 93010 ELECTROCARDIOGRAM REPORT: CPT

## 2019-02-23 PROCEDURE — 99223 1ST HOSP IP/OBS HIGH 75: CPT | Mod: AI,GC

## 2019-02-23 RX ORDER — SODIUM CHLORIDE 9 MG/ML
1000 INJECTION INTRAMUSCULAR; INTRAVENOUS; SUBCUTANEOUS ONCE
Qty: 0 | Refills: 0 | Status: COMPLETED | OUTPATIENT
Start: 2019-02-23 | End: 2019-02-23

## 2019-02-23 RX ORDER — DEXTROSE 50 % IN WATER 50 %
25 SYRINGE (ML) INTRAVENOUS ONCE
Qty: 0 | Refills: 0 | Status: DISCONTINUED | OUTPATIENT
Start: 2019-02-23 | End: 2019-02-26

## 2019-02-23 RX ORDER — TIOTROPIUM BROMIDE 18 UG/1
1 CAPSULE ORAL; RESPIRATORY (INHALATION) DAILY
Qty: 0 | Refills: 0 | Status: DISCONTINUED | OUTPATIENT
Start: 2019-02-23 | End: 2019-02-26

## 2019-02-23 RX ORDER — ATORVASTATIN CALCIUM 80 MG/1
40 TABLET, FILM COATED ORAL AT BEDTIME
Qty: 0 | Refills: 0 | Status: DISCONTINUED | OUTPATIENT
Start: 2019-02-23 | End: 2019-02-26

## 2019-02-23 RX ORDER — AZITHROMYCIN 500 MG/1
500 TABLET, FILM COATED ORAL EVERY 24 HOURS
Qty: 0 | Refills: 0 | Status: COMPLETED | OUTPATIENT
Start: 2019-02-24 | End: 2019-02-25

## 2019-02-23 RX ORDER — DEXTROSE 50 % IN WATER 50 %
12.5 SYRINGE (ML) INTRAVENOUS ONCE
Qty: 0 | Refills: 0 | Status: DISCONTINUED | OUTPATIENT
Start: 2019-02-23 | End: 2019-02-26

## 2019-02-23 RX ORDER — INSULIN LISPRO 100/ML
VIAL (ML) SUBCUTANEOUS AT BEDTIME
Qty: 0 | Refills: 0 | Status: DISCONTINUED | OUTPATIENT
Start: 2019-02-23 | End: 2019-02-26

## 2019-02-23 RX ORDER — TAMSULOSIN HYDROCHLORIDE 0.4 MG/1
0.4 CAPSULE ORAL AT BEDTIME
Qty: 0 | Refills: 0 | Status: DISCONTINUED | OUTPATIENT
Start: 2019-02-23 | End: 2019-02-26

## 2019-02-23 RX ORDER — GLUCAGON INJECTION, SOLUTION 0.5 MG/.1ML
1 INJECTION, SOLUTION SUBCUTANEOUS ONCE
Qty: 0 | Refills: 0 | Status: DISCONTINUED | OUTPATIENT
Start: 2019-02-23 | End: 2019-02-26

## 2019-02-23 RX ORDER — ENOXAPARIN SODIUM 100 MG/ML
40 INJECTION SUBCUTANEOUS DAILY
Qty: 0 | Refills: 0 | Status: DISCONTINUED | OUTPATIENT
Start: 2019-02-23 | End: 2019-02-26

## 2019-02-23 RX ORDER — ASPIRIN/CALCIUM CARB/MAGNESIUM 324 MG
81 TABLET ORAL DAILY
Qty: 0 | Refills: 0 | Status: DISCONTINUED | OUTPATIENT
Start: 2019-02-23 | End: 2019-02-26

## 2019-02-23 RX ORDER — DEXTROSE 50 % IN WATER 50 %
15 SYRINGE (ML) INTRAVENOUS ONCE
Qty: 0 | Refills: 0 | Status: DISCONTINUED | OUTPATIENT
Start: 2019-02-23 | End: 2019-02-26

## 2019-02-23 RX ORDER — METOPROLOL TARTRATE 50 MG
12.5 TABLET ORAL
Qty: 0 | Refills: 0 | Status: DISCONTINUED | OUTPATIENT
Start: 2019-02-23 | End: 2019-02-26

## 2019-02-23 RX ORDER — IPRATROPIUM/ALBUTEROL SULFATE 18-103MCG
3 AEROSOL WITH ADAPTER (GRAM) INHALATION ONCE
Qty: 0 | Refills: 0 | Status: COMPLETED | OUTPATIENT
Start: 2019-02-23 | End: 2019-02-23

## 2019-02-23 RX ORDER — IPRATROPIUM/ALBUTEROL SULFATE 18-103MCG
3 AEROSOL WITH ADAPTER (GRAM) INHALATION EVERY 6 HOURS
Qty: 0 | Refills: 0 | Status: DISCONTINUED | OUTPATIENT
Start: 2019-02-23 | End: 2019-02-23

## 2019-02-23 RX ORDER — INSULIN LISPRO 100/ML
VIAL (ML) SUBCUTANEOUS
Qty: 0 | Refills: 0 | Status: DISCONTINUED | OUTPATIENT
Start: 2019-02-23 | End: 2019-02-26

## 2019-02-23 RX ORDER — ALBUTEROL 90 UG/1
2.5 AEROSOL, METERED ORAL EVERY 4 HOURS
Qty: 0 | Refills: 0 | Status: DISCONTINUED | OUTPATIENT
Start: 2019-02-23 | End: 2019-02-26

## 2019-02-23 RX ORDER — BUDESONIDE AND FORMOTEROL FUMARATE DIHYDRATE 160; 4.5 UG/1; UG/1
2 AEROSOL RESPIRATORY (INHALATION)
Qty: 0 | Refills: 0 | Status: DISCONTINUED | OUTPATIENT
Start: 2019-02-23 | End: 2019-02-26

## 2019-02-23 RX ORDER — ALBUTEROL 90 UG/1
2.5 AEROSOL, METERED ORAL ONCE
Qty: 0 | Refills: 0 | Status: COMPLETED | OUTPATIENT
Start: 2019-02-23 | End: 2019-02-23

## 2019-02-23 RX ORDER — CLOPIDOGREL BISULFATE 75 MG/1
75 TABLET, FILM COATED ORAL DAILY
Qty: 0 | Refills: 0 | Status: DISCONTINUED | OUTPATIENT
Start: 2019-02-23 | End: 2019-02-26

## 2019-02-23 RX ORDER — AZITHROMYCIN 500 MG/1
TABLET, FILM COATED ORAL
Qty: 0 | Refills: 0 | Status: COMPLETED | OUTPATIENT
Start: 2019-02-23 | End: 2019-02-25

## 2019-02-23 RX ORDER — AZITHROMYCIN 500 MG/1
500 TABLET, FILM COATED ORAL ONCE
Qty: 0 | Refills: 0 | Status: COMPLETED | OUTPATIENT
Start: 2019-02-23 | End: 2019-02-23

## 2019-02-23 RX ORDER — LOSARTAN POTASSIUM 100 MG/1
50 TABLET, FILM COATED ORAL DAILY
Qty: 0 | Refills: 0 | Status: DISCONTINUED | OUTPATIENT
Start: 2019-02-23 | End: 2019-02-26

## 2019-02-23 RX ORDER — SODIUM CHLORIDE 9 MG/ML
1000 INJECTION, SOLUTION INTRAVENOUS
Qty: 0 | Refills: 0 | Status: DISCONTINUED | OUTPATIENT
Start: 2019-02-23 | End: 2019-02-26

## 2019-02-23 RX ADMIN — Medication 40 MILLIGRAM(S): at 17:04

## 2019-02-23 RX ADMIN — Medication 4: at 17:04

## 2019-02-23 RX ADMIN — TAMSULOSIN HYDROCHLORIDE 0.4 MILLIGRAM(S): 0.4 CAPSULE ORAL at 21:21

## 2019-02-23 RX ADMIN — Medication 125 MILLIGRAM(S): at 11:36

## 2019-02-23 RX ADMIN — ALBUTEROL 2.5 MILLIGRAM(S): 90 AEROSOL, METERED ORAL at 16:28

## 2019-02-23 RX ADMIN — ALBUTEROL 2.5 MILLIGRAM(S): 90 AEROSOL, METERED ORAL at 21:31

## 2019-02-23 RX ADMIN — SODIUM CHLORIDE 1000 MILLILITER(S): 9 INJECTION INTRAMUSCULAR; INTRAVENOUS; SUBCUTANEOUS at 11:37

## 2019-02-23 RX ADMIN — Medication 2: at 21:24

## 2019-02-23 RX ADMIN — AZITHROMYCIN 255 MILLIGRAM(S): 500 TABLET, FILM COATED ORAL at 15:17

## 2019-02-23 RX ADMIN — ENOXAPARIN SODIUM 40 MILLIGRAM(S): 100 INJECTION SUBCUTANEOUS at 21:21

## 2019-02-23 RX ADMIN — ATORVASTATIN CALCIUM 40 MILLIGRAM(S): 80 TABLET, FILM COATED ORAL at 21:21

## 2019-02-23 RX ADMIN — ALBUTEROL 2.5 MILLIGRAM(S): 90 AEROSOL, METERED ORAL at 12:12

## 2019-02-23 RX ADMIN — BUDESONIDE AND FORMOTEROL FUMARATE DIHYDRATE 2 PUFF(S): 160; 4.5 AEROSOL RESPIRATORY (INHALATION) at 21:08

## 2019-02-23 RX ADMIN — SODIUM CHLORIDE 1000 MILLILITER(S): 9 INJECTION INTRAMUSCULAR; INTRAVENOUS; SUBCUTANEOUS at 12:30

## 2019-02-23 RX ADMIN — Medication 3 MILLILITER(S): at 11:37

## 2019-02-23 RX ADMIN — Medication 12.5 MILLIGRAM(S): at 17:05

## 2019-02-23 NOTE — ED PROVIDER NOTE - PROGRESS NOTE DETAILS
Pts HR now in 130's , sinus. Pt states SOB feeling improved. Still with some wheezing, will rx. Dw RT re ABG, will start bipap (she already loi wilson). Pt doing well, awake and alert, stillstates feeling improved from arrival. Dw RT re ABG, will start bipap (she already loi wilson). Pt doing well, awake and alert, still states feeling improved from arrival. Dw Dr MARIA ANTONIA Dasilva, will see pt to admit

## 2019-02-23 NOTE — ED PROVIDER NOTE - CHPI ED SYMPTOMS NEG
no body aches/no cough/no chills/no diaphoresis/no wheezing/no edema/no fever/no headache/no hemoptysis

## 2019-02-23 NOTE — H&P ADULT - PROBLEM SELECTOR PLAN 8
-Lovenox 40mg subQ for DVT ppx -Padua score: 2  -Lovenox 40mg subQ for DVT ppx -on home Flomax 0.4mg daily, will continue

## 2019-02-23 NOTE — H&P ADULT - PROBLEM SELECTOR PLAN 1
-admit to tele -Admit to tele  -Tachycardia likely caused by COPD exacerbation   -Patient did not seem to be in afib more likely SVT vs artifact on strip, will continue to monitor patient on tele for arrhythmia  -trops negative x1, ProBNP 175  -Keep Mg> 2, K> 4  -Cardio, Dr. Capps, recs appreciated -admit to tele  -unclear trigger, CXR negative  -retaining CO2, f/u repeat ABG at 14:00  -Flu/RSV panel negative, f/u RVP panel  -f/u BCx  -Continue bipap qhs and Nasal cannula. Attempt to wean supplemental O2.  -Continue with albuterol and duonebs PRN  -Pulm, Dr. Dejesus, consulted, f/u recs  -ICU consulted, f/u recs -admit to tele  -unclear trigger, CXR negative  -retaining CO2, f/u repeat ABG at 14:00  -Flu/RSV panel negative, f/u RVP panel  -f/u BCx  -Continue bipap qhs and Nasal cannula. Attempt to wean supplemental O2.  -Start patient on IV solumedrol 40mg BID  -Continue with albuterol q4hrs and duonebs PRN  -Pulm, Dr. Dejesus, consulted, f/u recs  -ICU consulted, f/u recs -admit to tele  -likely secondary to COPD exacerbation, CXR negative  -retaining CO2, f/u repeat ABG at 14:00  -Flu/RSV panel negative, f/u RVP panel  -f/u BCx  -Continue bipap qhs and Nasal cannula. Attempt to wean supplemental O2.  -Start patient on IV solumedrol 40mg BID  -Continue with duonebs q6hrs  -Pulm, Dr. Dejesus, consulted, f/u recs  -ICU consulted, f/u recs

## 2019-02-23 NOTE — H&P ADULT - PROBLEM SELECTOR PLAN 2
-unclear trigger, CXR negative  -retaining CO2, f/u repeat ABG at 14:00  -Flu/RSV panel negative, f/u RVP panel  -f/u BCx  -Continue bipap qhs and Nasal cannula. Attempt to wean supplemental O2.  -Continue with albuterol and duonebs PRN  -Pulm, Dr. Dejesus, consulted, f/u recs  -ICU consulted, f/u recs -Tachycardia likely caused by COPD exacerbation   -Patient did not seem to be in afib more likely SVT vs artifact on strip, will continue to monitor patient on tele for arrhythmia  -trops negative x1, ProBNP 175  -Keep Mg> 2, K> 4  -Cardio, Dr. Capps, recs appreciated -see management above -unclear etiology  -see management above

## 2019-02-23 NOTE — CONSULT NOTE ADULT - SUBJECTIVE AND OBJECTIVE BOX
[facility]         Saud Aguilar, Dani, Génesis, Medina, Carroll, Kumar        [phone] (office)    CHIEF COMPLAINT: Patient is a 78y old  Male who presents with a chief complaint of SOB (23 Feb 2019 12:18)      HPI:  77 yo M with Hx asthma, COPD with frequent admissions to this and other hospitals, chronic hypoxemic respiratory failure, CAD with 3v CABG 2004, without any other more recent cardiac issue.  His wife reports a normal stress test in 8/2018.  He has peripheral vascular disease status post stents years ago, which have been found patent.  He was here in October with resp failure, complicated by a bradycardic/PEA arrest. Echo during that hospitalization revealed a normal lvef without significant valve disease. He was admitted again on several other recent occasions with dyspnea requiring admission.    He presents along with his wife for dyspnea, refractory to the use of nebs and bipap at home.  He was most recently seen here 2/12/19 for acute on chronic hypoxic/hypercarbic respiratory failure due to COPD exacerbation. He was treated with Azithromycin, Solumedrol, inhalers, and nebulizer treatments.    In the ED, , /78, RR 18, SpO2 99% on 2L NC.  Labs showed CBC wnl, PT/INR wnl, lactate 1.0, BMP potassium 5.0, glucose 218, alk phos 135, ABG showed pCO2 76, pH 7.22, HCO3 25.    Patient received 1L NS bolus, albuterol x1, duoneb x1, solumedrol 125mg IV x1.  Blood cultures and Flu/RSV sent from ED. (23 Feb 2019 12:18)      PAST MEDICAL & SURGICAL HISTORY:  PVD (peripheral vascular disease)  Hypertension  COPD (chronic obstructive pulmonary disease)  Osteomyelitis  Dyslipidemia  CAD (Coronary Artery Disease)  Diabetes Mellitus, Type II  Compound fracture: left leg  CABG (Coronary Artery Bypass Graft)      SOCIAL HISTORY: No active tobacco, alcohol or illicit drug use    FAMILY HISTORY:  Family history of diabetes mellitus (Sibling)   No pertinent family history of CAD    Outpatient medications:  · 	valsartan 80 mg oral tablet: 1 tab(s) orally once a day, Last Dose Taken:    · 	Multiple Vitamins oral tablet: 1 tab(s) orally once a day, Last Dose Taken:    · 	metoprolol tartrate 25 mg oral tablet: 0.5 tab(s) orally 2 times a day, Last Dose Taken:    · 	clopidogrel 75 mg oral tablet: 1 tab(s) orally once a day, Last Dose Taken:    · 	aspirin 81 mg oral delayed release tablet: 1 tab(s) orally once a day, Last Dose Taken:    · 	atorvastatin 40 mg oral tablet: 1 tab(s) orally once a day, Last Dose Taken:    · 	glipiZIDE 2.5 mg oral tablet, extended release: 1 tab(s) orally once a day, Last Dose Taken:    · 	tamsulosin 0.4 mg oral capsule: 1 cap(s) orally once a day (at bedtime), Last Dose Taken:  MEDICATIONS  (STANDING):    MEDICATIONS  (PRN):      Allergies    No Known Allergies    Intolerances    shellfish (Nausea)      REVIEW OF SYSTEMS: Is negative for eye, ENT, GI, , allergic, dermatologic, musculoskeletal and neurologic, except as described above.    VITAL SIGNS:   Vital Signs Last 24 Hrs  T(C): 36.7 (23 Feb 2019 10:57), Max: 36.7 (23 Feb 2019 10:57)  T(F): 98 (23 Feb 2019 10:57), Max: 98 (23 Feb 2019 10:57)  HR: 99 (23 Feb 2019 12:28) (99 - 192)  BP: 145/78 (23 Feb 2019 12:01) (145/78 - 156/102)  BP(mean): --  RR: 18 (23 Feb 2019 12:01) (18 - 18)  SpO2: 113% (23 Feb 2019 12:28) (97% - 113%)    I&O's Summary      PHYSICAL EXAM:    Constitutional: mild resp distress, awake and alert, well-developed  Eyes:  EOMI, no oral cyanosis, conjunctivae clear, anicteric.  Pulmonary: bipap, resp labored, breath sounds are decr bilaterally, no wheezing, rales or rhonchi  Cardiovascular:  regular S1 and S2. No murmur.  No rubs, gallops or clicks  Gastrointestinal: Bowel Sounds present, soft, nontender.   Lymph: No peripheral edema.   Neurological: Alert, strength and sensitivity are grossly intact  Skin: No obvious lesions/rashes.   Psych:  Mood & affect appropriate .    LABS: All Labs Reviewed:                        13.8   10.46 )-----------( 346      ( 23 Feb 2019 11:19 )             45.8     23 Feb 2019 11:19    140    |  102    |  13     ----------------------------<  218    5.0     |  31     |  1.30     Ca    9.5        23 Feb 2019 11:19    TPro  7.5    /  Alb  3.8    /  TBili  0.3    /  DBili  x      /  AST  28     /  ALT  51     /  AlkPhos  135    23 Feb 2019 11:19    PT/INR - ( 23 Feb 2019 11:19 )   PT: 10.5 sec;   INR: 0.92 ratio         PTT - ( 23 Feb 2019 11:19 )  PTT:33.7 sec  CARDIAC MARKERS ( 23 Feb 2019 11:19 )  <.015 ng/mL / x     / 116 U/L / x     / x          Blood Culture:   02-23 @ 11:19  Pro Bnp 175        RADIOLOGY:  < from: TTE Echo Doppler w/o Cont (10.26.18 @ 19:52) >     EXAM:  ECHO TTE W/O CON COMP W/DOPPLR         PROCEDURE DATE:  10/26/2018        INTERPRETATION:  Ordering Physician: RICKEY TRUJILLO    Indication: Cardiac arrest    Technician:     Study Quality: Poor given that the patient was supine in the ICU   A complete echocardiographic study was performed utilizing standard   protocol including spectral and color Doppler in all echocardiographic   windows.    Height: 180 cm  Weight: 99 kg  Blood Pressure: 54/74    MEASUREMENTS  IVS: 0.99 cm  PWT: 0.78cm  LA: 3.4cm  AO: 2.9cm  LVIDd: Unable to measured given poor endocardial border definition  LVIDs: Unable to measure given poor endocardial border definition      LVEF: Visually estimated in the LV short axis view (best images of the   LV) is 55-60 %  RVSP: Unable to assess given lack of adequate TR waveform  RA Pressure: 15 mmHg  IVC: Dilated at 2.4 cm in diameter and collapses less than 50% with   inspiration (not valid if patient is intubated and on mechanical   ventilatory support)    FINDINGS  Left Ventricle: Over the left ventricle is poorly visualized. There was   best visualized in the LV short axis view. The visual estimation of the   ejection fraction is 55-60%. I'm unable to rule out regional wall motion   abnormalities given the poor acoustic windows.  Right Ventricle: Overall poorly visualized  Left Atrium: Grossly normal in size  Right Atrium: Overall poorly visualized  Mitral Valve: Normal in structure with trace mitral valve regurgitation  Aortic Valve: Trileaflet and sclerotic with no evidence of significant   insufficiency. No stenosis  Tricuspid Valve: Overall poorly visualized. There was trace tricuspid   valve regurgitation  Pulmonic Valve: Poorly visualized and poor Doppler interrogation  Diastolic Function: The LV diastolic function is indeterminate in this   study  Pericardium/Pleura: No significant pericardial or pleural effusions noted      CONCLUSIONS:  1. Normal technically limited study.  2. The left ventricle was best visualized in the shortaxis view. The LV   systolic function in that view appears preserved with a visually   estimated ejection fraction of 55-60%.  3. I'm unable to rule out regional wall motion abnormalities given the   poor endomyocardial border definition.  4. Normal mitral valve with trace regurgitation.  5. Sclerotic trileaflet aortic valve with no insufficiency or stenosis.  6. Poorly visualized right-sided chambers.  7. Limited study to assess pulmonary artery systolic pressure.                  MAC TAYLOR   This document has been electronically signed. Oct 27 2018 11:31AM                < end of copied text >    EKG: st

## 2019-02-23 NOTE — H&P ADULT - PROBLEM SELECTOR PLAN 5
-on home ASA and plavix, will continue -DMII, on home Metformin and glipizide. Hold oral home meds  -start Low dose ISS   -Hypoglycemia protocol

## 2019-02-23 NOTE — PATIENT PROFILE ADULT - NSPROGENOTHERPROVIDER_GEN_A_NUR
medical specialist/Dr Sarah Avila primary [phone]  Dr Rod cardio  Dr Dejesus pulmonary dr respiratory services/medical specialist/Dr Sarah Avila primary [phone]  Dr Rod cardio  Dr Dejesus pulmonary dr

## 2019-02-23 NOTE — CONSULT NOTE ADULT - ATTENDING COMMENTS
Patient seen and evaluated independently from above.     This is a 78-year-old male with a history of Asthma/COPD with chronic hypoxic and hypercapnic respiratory failure on home oxygen and BiPAP qhs, recent PEA cardiac arrest in October 2/2 hypercapnia, HTN, HLD, CAD s/p CABG, PVD s/p stents, and DM2 who presents with acute hypoxic and hypercapnic respiratory failure due to COPD exacerbation, in the setting of recently discontinuing steroids. In the ED, he was afebrile and hemodynamically stable. Found to have hypercapnia that improved with bilevel PAP. Per patient, he developed claustrophobia at home from the bilevel and did not wear last night.    - Currently markedly improved on bilevel with improved pH and hypercapnia  - Continue systemic glucocorticoids  - Agree with continuing ICS/LABA + LAMA + Albuterol prn  - Azithromycin for COPD exacerbation  - Influenza A/B and RSV negative  - Does not require ICU care at this time, please call back with questions

## 2019-02-23 NOTE — H&P ADULT - ASSESSMENT
79 yo male with PMHx of HTN, DM2 (on home Metformin and glipizide), COPD (2L home/ BIPAP at night), CABG, HLD, hx hypercapnic resp failure with recent intubation c/b with cardiac arrest (12/2018), presenting to ED with SOB since yesterday admitted for COPD exacerbation and rapid afib. 77 yo male with PMHx of HTN, DM2 (on home Metformin and glipizide), COPD (2L home/ BIPAP at night), CAD with 3v CABG 2004, HLD, hx hypercapnic resp failure with recent intubation c/b with cardiac arrest (12/2018), presenting to ED with SOB since yesterday admitted for COPD exacerbation and ?rapid afib vs tachycardia. 79 yo male with PMHx of HTN, DM2 (on home Metformin and glipizide), COPD (2L home/ BIPAP at night), CAD with 3v CABG 2004, HLD, hx hypercapnic resp failure with recent intubation c/b with cardiac arrest (12/2018), presenting to ED with SOB since yesterday admitted for COPD exacerbation and vs tachycardia.

## 2019-02-23 NOTE — ED PROVIDER NOTE - OBJECTIVE STATEMENT
79 yo M p/w co feeling sob since yesterday. No chest pain. Some assoc palpitations. no numb/ting/focal weak. no weakness / malaise. no numb/ting/focal weak. No recent LA. No fever/chills. No cough/sputum. no abd pain. no v/d. no agg/allev factors. No other inj or co.

## 2019-02-23 NOTE — H&P ADULT - ATTENDING COMMENTS
Pt. seen and evaluated in ED.  79y/o M. with hx of COPD/Asthma and CAD presenting with SOB.  Found to be in acute hypercapnic respiratory failure 2/2 acute COPD exacerbation.  Physical examination as above.    -Acute hypercapnic respiratory failure 2/2 acute COPD exacerbation:  Start Solu-medrol 40mg IV Q12h and Duoneb tx Q6h.  Continue BiPAP/O2 support.  Pulmonary and Intensivist consult.   -Sinus tachycardia:  likely 2/2 to pulmonary process.  Continue telemetry monitor.   -CAD:  continue ASA 81mg PO daily, Plavix 75mg PO daily, metoprolol 12.5mg PO BID, and Lipitor 40mg PO QHS  -HTN:  continue metoprolol and losartan  -HLD:  continue statin tx  -Type 2 DM:  low dose humalog sliding scale  -BPH:  continue Flomax 0.4mg PO QHS  -VTE ppx: Lovenox 40mg SQ daily    IMPROVE VTE Individual Risk Assessment          RISK                                                          Points  [  ] Previous VTE                                                3  [  ] Thrombophilia                                             2  [  ] Lower limb paralysis                                   2        (unable to hold up >15 seconds)    [  ] Current Cancer                                             2         (within 6 months)  [  ] Immobilization > 24 hrs                              1  [  ] ICU/CCU stay > 24 hours                             1  [ x] Age > 60                                                         1    IMPROVE VTE Score:  1

## 2019-02-23 NOTE — H&P ADULT - NSHPREVIEWOFSYSTEMS_GEN_ALL_CORE
Review of Systems  General: no fever, chills  Eyes: no vision changes  ENT: no hearing changes, nasal congestions, or sore throat  CV: no chest pain, + palpitations  Pulm: + SOB, no wheezing, + cough, no hemoptysis  Abd/GI: no nausea, vomiting, diarrhea, constipation, abd pain  : no dysuria, hematuria, urinary frequency  MSK: no joint pain or myalgias  Neuro: no syncope, dizziness, + generalized weakness  Psych: no anxiety or depression  Endo: no heat or cold intolerance

## 2019-02-23 NOTE — CONSULT NOTE ADULT - SUBJECTIVE AND OBJECTIVE BOX
Patient is a 78y old  Male who presents with a chief complaint of SOB (23 Feb 2019 12:58)       77 y/o male pmhx COPD/ Asmtha, COURTNEY, HTN, PVD, HLD, DM2,   PAST MEDICAL & SURGICAL HISTORY:  PVD (peripheral vascular disease)  Hypertension  COPD (chronic obstructive pulmonary disease)  Osteomyelitis  Dyslipidemia  CAD (Coronary Artery Disease)  Diabetes Mellitus, Type II  Compound fracture: left leg  CABG (Coronary Artery Bypass Graft)    Allergies    No Known Allergies    Intolerances    shellfish (Nausea)    FAMILY HISTORY:  Family history of diabetes mellitus (Sibling)      Review of Systems:  CONSTITUTIONAL: No fever, chills, or fatigue  EYES: No eye pain, visual disturbances, or discharge  ENMT:  No difficulty hearing, tinnitus, vertigo; No sinus or throat pain  NECK: No pain or stiffness  RESPIRATORY: No cough, wheezing, chills or hemoptysis; No shortness of breath  CARDIOVASCULAR: No chest pain, palpitations, dizziness, or leg swelling  GASTROINTESTINAL: No abdominal or epigastric pain. No nausea, vomiting, or hematemesis; No diarrhea or constipation. No melena or hematochezia.  GENITOURINARY: No dysuria, frequency, hematuria, or incontinence  NEUROLOGICAL: No headaches, memory loss, loss of strength, numbness, or tremors  SKIN: No itching, burning, rashes, or lesions   MUSCULOSKELETAL: No joint pain or swelling; No muscle, back, or extremity pain  PSYCHIATRIC: No depression, anxiety, mood swings, or difficulty sleeping      Medications:                                  ICU Vital Signs Last 24 Hrs  T(C): 36.7 (23 Feb 2019 10:57), Max: 36.7 (23 Feb 2019 10:57)  T(F): 98 (23 Feb 2019 10:57), Max: 98 (23 Feb 2019 10:57)  HR: 99 (23 Feb 2019 12:28) (99 - 192)  BP: 145/78 (23 Feb 2019 12:01) (145/78 - 156/102)  BP(mean): --  ABP: --  ABP(mean): --  RR: 18 (23 Feb 2019 12:01) (18 - 18)  SpO2: 113% (23 Feb 2019 12:28) (97% - 113%)    Vital Signs Last 24 Hrs  T(C): 36.7 (23 Feb 2019 10:57), Max: 36.7 (23 Feb 2019 10:57)  T(F): 98 (23 Feb 2019 10:57), Max: 98 (23 Feb 2019 10:57)  HR: 99 (23 Feb 2019 12:28) (99 - 192)  BP: 145/78 (23 Feb 2019 12:01) (145/78 - 156/102)  BP(mean): --  RR: 18 (23 Feb 2019 12:01) (18 - 18)  SpO2: 113% (23 Feb 2019 12:28) (97% - 113%)    ABG - ( 23 Feb 2019 11:55 )  pH, Arterial: 7.22  pH, Blood: x     /  pCO2: 76    /  pO2: 147   / HCO3: 25    / Base Excess: 3.3   /  SaO2: 99                  I&O's Detail        LABS:                        13.8   10.46 )-----------( 346      ( 23 Feb 2019 11:19 )             45.8     02-23    140  |  102  |  13  ----------------------------<  218<H>  5.0   |  31  |  1.30    Ca    9.5      23 Feb 2019 11:19    TPro  7.5  /  Alb  3.8  /  TBili  0.3  /  DBili  x   /  AST  28  /  ALT  51  /  AlkPhos  135<H>  02-23      CARDIAC MARKERS ( 23 Feb 2019 11:19 )  <.015 ng/mL / x     / 116 U/L / x     / x          CAPILLARY BLOOD GLUCOSE        PT/INR - ( 23 Feb 2019 11:19 )   PT: 10.5 sec;   INR: 0.92 ratio         PTT - ( 23 Feb 2019 11:19 )  PTT:33.7 sec    CULTURES:      Physical Examination:    General: No acute distress.  Alert, oriented, interactive, nonfocal    HEENT: Pupils equal, reactive to light.  Symmetric.    PULM: Clear to auscultation bilaterally, no significant sputum production    CVS: Regular rate and rhythm, no murmurs, rubs, or gallops    ABD: Soft, nondistended, nontender, normoactive bowel sounds, no masses    EXT: No edema, nontender    SKIN: Warm and well perfused, no rashes noted.    NEURO: A&Ox3, strength 5/5 all extremities, cranial nerves grossly intact, no focal deficits    RADIOLOGY: ***    CRITICAL CARE TIME SPENT: ***  Evaluating/treating patient, reviewing data/labs/imaging, discussing case with multidisciplinary team, discussing plan/goals of care with patient/family. Non-inclusive of procedure time. Patient is a 78y old  Male who presents with a chief complaint of SOB (23 Feb 2019 12:58)       77 y/o male pmhx COPD/ Asmtha, HTN, chronic hypercapnic respiratory failure w/ home O2 and BiPAP, PVD, HLD, DM2, w/ recent PEA arrest in October 2018. BIBEMS w/ worsening SOB and tachycardia. As per wife patient has had worsening SOB since yesterday and last night took off his BiPAP at around 3am then went back to sleep. This am woke up extremely SOB w/ increased work of breathing, wife then called EMS. He denies any fever/chills, chest pain, palpitations, headache /dizziness, or any  recent sick contacts.  In ED patient found to have ABG 7.22/76/  /   placed on Bipap 14/8, 24% w/ sinus tachycardia to 115-120.       PAST MEDICAL & SURGICAL HISTORY:  PVD (peripheral vascular disease)  Hypertension  COPD (chronic obstructive pulmonary disease)  Osteomyelitis  Dyslipidemia  CAD (Coronary Artery Disease)  Diabetes Mellitus, Type II  Compound fracture: left leg  CABG (Coronary Artery Bypass Graft)    Allergies    No Known Allergies    Intolerances    shellfish (Nausea)    FAMILY HISTORY:  Family history of diabetes mellitus (Sibling)      Review of Systems:  CONSTITUTIONAL: No fever, chills, or fatigue  EYES: No eye pain, visual disturbances, or discharge  ENMT:  No difficulty hearing, tinnitus, vertigo; No sinus or throat pain  NECK: No pain or stiffness  RESPIRATORY: No cough, wheezing, chills or hemoptysis; No shortness of breath  CARDIOVASCULAR: No chest pain, palpitations, dizziness, or leg swelling  GASTROINTESTINAL: No abdominal or epigastric pain. No nausea, vomiting, or hematemesis; No diarrhea or constipation. No melena or hematochezia.  GENITOURINARY: No dysuria, frequency, hematuria, or incontinence  NEUROLOGICAL: No headaches, memory loss, loss of strength, numbness, or tremors  SKIN: No itching, burning, rashes, or lesions   MUSCULOSKELETAL: No joint pain or swelling; No muscle, back, or extremity pain  PSYCHIATRIC: No depression, anxiety, mood swings, or difficulty sleeping      Medications:                                  ICU Vital Signs Last 24 Hrs  T(C): 36.7 (23 Feb 2019 10:57), Max: 36.7 (23 Feb 2019 10:57)  T(F): 98 (23 Feb 2019 10:57), Max: 98 (23 Feb 2019 10:57)  HR: 99 (23 Feb 2019 12:28) (99 - 192)  BP: 145/78 (23 Feb 2019 12:01) (145/78 - 156/102)  BP(mean): --  ABP: --  ABP(mean): --  RR: 18 (23 Feb 2019 12:01) (18 - 18)  SpO2: 113% (23 Feb 2019 12:28) (97% - 113%)    Vital Signs Last 24 Hrs  T(C): 36.7 (23 Feb 2019 10:57), Max: 36.7 (23 Feb 2019 10:57)  T(F): 98 (23 Feb 2019 10:57), Max: 98 (23 Feb 2019 10:57)  HR: 99 (23 Feb 2019 12:28) (99 - 192)  BP: 145/78 (23 Feb 2019 12:01) (145/78 - 156/102)  BP(mean): --  RR: 18 (23 Feb 2019 12:01) (18 - 18)  SpO2: 113% (23 Feb 2019 12:28) (97% - 113%)    ABG - ( 23 Feb 2019 11:55 )  pH, Arterial: 7.22  pH, Blood: x     /  pCO2: 76    /  pO2: 147   / HCO3: 25    / Base Excess: 3.3   /  SaO2: 99                  I&O's Detail        LABS:                        13.8   10.46 )-----------( 346      ( 23 Feb 2019 11:19 )             45.8     02-23    140  |  102  |  13  ----------------------------<  218<H>  5.0   |  31  |  1.30    Ca    9.5      23 Feb 2019 11:19    TPro  7.5  /  Alb  3.8  /  TBili  0.3  /  DBili  x   /  AST  28  /  ALT  51  /  AlkPhos  135<H>  02-23      CARDIAC MARKERS ( 23 Feb 2019 11:19 )  <.015 ng/mL / x     / 116 U/L / x     / x          CAPILLARY BLOOD GLUCOSE        PT/INR - ( 23 Feb 2019 11:19 )   PT: 10.5 sec;   INR: 0.92 ratio         PTT - ( 23 Feb 2019 11:19 )  PTT:33.7 sec    CULTURES:      Physical Examination:    General: AAOx3. Mild respiratory distress     HEENT: Pupils equal, reactive to light.  Symmetric.    PULM: b/l diffuse end expiratory wheeze.  No rales/rhonchi.     CVS: + S1/S2. Sinus tachycardia. No murmurs     ABD: Soft, nondistended, nontender, normoactive bowel sounds, no masses    EXT: No edema, nontender    SKIN: Warm and well perfused, no rashes noted.    NEURO: A&Ox3, strength 5/5 all extremities, cranial nerves grossly intact, no focal deficits    RADIOLOGY: < from: Xray Chest 1 View AP/PA (02.23.19 @ 13:01) >    EXAM:  XR CHEST AP OR PA 1V                            PROCEDURE DATE:  02/23/2019          INTERPRETATION:  Chest 1 view    HISTORY: Weakness and shortness of breath    Comparison: 2/10/2019    Radiographic examination shows the heart to be normalin size. The lungs   are hyperinflated but clear. There is no evidence of focal infiltrate,   pneumothorax or pleural effusion. Sternal wires are again noted.    IMPRESSION: Hyperinflated lungs.    Thank you for this referral.    < end of copied text >      CRITICAL CARE TIME SPENT:  34 min  Evaluating/treating patient, reviewing data/labs/imaging, discussing case with multidisciplinary team, discussing plan/goals of care with patient/family. Non-inclusive of procedure time.

## 2019-02-23 NOTE — H&P ADULT - PROBLEM SELECTOR PLAN 3
-On home Valsartan 80mg, therapeutic interchange to Losartan 50mg daily  -On home Metoprolol tartrate 12.5mg BID, will continue with hold parameters -Tachycardia likely caused by COPD exacerbation   -Patient did not seem to be in afib more likely SVT vs artifact on strip, will continue to monitor patient on tele for arrhythmia  -trops negative x1, ProBNP 175  -Keep Mg> 2, K> 4  -Cardio, Dr. Capps, recs appreciated

## 2019-02-23 NOTE — ED ADULT NURSE NOTE - OBJECTIVE STATEMENT
79 y/o males comes in with complaints of shortness of breath and rapid heart rate. states the symptoms started last night. Nasal cannula 2L in place. EKG obtained on arrival. Cardiac monitor in place. IV placed, blood sent, fluids started. plan of care discussed with patient. safety precautions in place. will continue to monitor.

## 2019-02-23 NOTE — PATIENT PROFILE ADULT - NSPROHMDIABETBLDGLCUSUALFT_GEN_A_NUR
am is usuall below 120. sometimes at night it is 150-160 am is usually below 120. sometimes at night it is 150-160

## 2019-02-23 NOTE — ED ADULT NURSE REASSESSMENT NOTE - NS ED NURSE REASSESS COMMENT FT1
patient resting comfortably on Bipap. He is awaiting bed on telemetry unit. no signs of distress. will continue to monitor.

## 2019-02-23 NOTE — CONSULT NOTE ADULT - ASSESSMENT
77 yo M with Hx asthma, COPD with frequent admissions to this and other hospitals, chronic hypoxemic respiratory failure, CAD with 3v CABG 2004, without any other more recent cardiac issue.  His wife reports a normal stress test in 8/2018.  He has peripheral vascular disease status post stents years ago, which have been found patent.  He was here in October with resp failure, complicated by a bradycardic/PEA arrest. Echo during that hospitalization revealed a normal lvef without significant valve disease. He was admitted again on several other recent occasions with dyspnea requiring admission.    He presents along with his wife for dyspnea, refractory to the use of nebs and bipap at home.  He was most recently seen here 2/12/19 for acute on chronic hypoxic/hypercarbic respiratory failure due to COPD exacerbation. He was treated with Azithromycin, Solumedrol, inhalers, and nebulizer treatments.    His course has been notable for severe acute resp acidosis.  He remains on bipap.    -there is no evidence of acute ischemia.  -he does have a hx of cad s/p remote cabg  -cont dapt, cont statin  -cont bb as tolerated    -he is tachycardic, reactive to the severity of his acute illness  -he has had pea arrest related to severe acute hypercarbic resp failure in the past, and is at risk of recurrence  -monitor carefully    -there is no evidence for meaningful  volume overload.  -normal ef 10/2018, no need to repeat at this time  -no pasp was estimated at that time, but repetition of the echo is low yield    -DVT prophylaxis  -monitor electrolytes, keep k>4, Mg>2  -will follow    Noting cad, brittle resp failure, the patient is at risk of abrupt decompensation.  35 minutes was spent with this patient.
COMMODORE TURNER [facility]    ALLERGY Shellfish  CONTACT Sp Amira C    REASON FOR VISIT Subsequent pulm fu   Initial evaluation/Pulmonary consultation requested  2/23/2019 from Dr Dejesus by Dr Hughes    Patient examined chart reviewed  HOSPITAL ADMISSION [facility] Dr Ernie Dasilva 2/23/2019  PATIENT CAME  FROM (if information available)      VITALS/LABS         2/23/2019 W 10.4 Hb 13.8 Plt 346 INR .9 Na 140 K 5 CO2 31 Cr 1.3   2/23/2019 .28 722/76/147  2/23/2019 12p Placed on bpap 14/6/.24 2/23/2019 fluab n rsv n   2/23/2019 cxr hyperinflated lungs     REVIEW OF SYMPTOMS    Able to give ROS  NO     PHYSICAL EXAM    HEENT Unremarkable PERRLA atraumatic   RESP Fair air entry EXP prolonged    Harsh breath sound Resp distres mild   CARDIAC S1 S2 No S3     NO JVD    ABDOMEN SOFT BS PRESENT NOT DISTENDED No hepatosplenomegaly PEDAL EDEMA present No calf tenderness  NO rash   GENERAL Not TOXIC looking    GLOBAL ISSUE/BEST PRACTICE:      PROBLEM: HOB elevation:   y            PROBLEM: Stress ulcer proph:    na                      PROBLEM: VTE prophylaxis:      lvnx 40 (2/23)   PROBLEM: Glycemic control:    iss (2/23)  PROBLEM: Nutrition:    DASH (2/23)   PROBLEM: Advanced directive: na     PROBLEM: Allergies:  na    PAST MEDICAL & SURGICAL HISTORY:  Past Medical History:  CAD (Coronary Artery Disease)    COPD (chronic obstructive pulmonary disease)    Diabetes Mellitus, Type II    Dyslipidemia    Hypertension    Osteomyelitis    PVD (peripheral vascular disease).     Past Surgical History:  CABG (Coronary Artery Bypass Graft)    Compound fracture  left leg.    HOSPITAL STAYS   12/21-12/24/2018  10/26-11/2/2018 6/11-6/14/2018 1/20-1/23/2018  2/10-2/12/2019    PATIENT PROBLEMS   10/26-11/2/2018  ADMISSION [facility]    CARDIAC ARREST 10/26/2018   TARGETED TEMPERATURE THERAPY POST CAC 10/26/2018   INTUBATION MECHANICAL VENT 10/26/2018  IV SEDATIVE DRIP FOR MECHANICAL VENTILATION    COPD EX   HYPERGLYCEMIA REQUIRED INSULIN DRIO 10/26-10/28/2018   MERRILL NONOLIGURIC 10/26/2018     PATIENT DESCRIPTION PATIENT COMMODORE TURNER 2/23/2019  ADMISSION [facility] HOSPITALIST       History of Present Illness:  Reason for Admission: SOB  History of Present Illness:   77 yo male with PMHx of HTN, DM2 (on home Metformin and glipizide), COPD (2L home/ BIPAP at night), CAD with 3v CABG 2004, HLD, hx hypercapnic resp failure with recent intubation c/b with cardiac arrest (12/2018), presenting to ED with SOB since yesterday.  Patient states that last night he was lying in bed when all of a sudden had labored breathing, he got up to use the bathroom, SOB got worse.  Patient felt palpitations at this time but no chest pain/pressure.  Patient had BM and states that it helped with SOB, however still present and so patient used home nebulizers and put on BiPAP machine, which again helped a little but did not cause symptoms to go away.  Patient got minimal sleep last night, constantly having SOB and this morning as per wife, patient was not able to get up had significant weakness and so she called 911.  Patient states SOB still present but currently on BiPAP machine, is feeling a little better.  Denies dizziness, lightheadedness, fevers, chills, nausea, chest pain, abdominal pain.  Patient does have chronic cough related to COPD however nonproductive.  Patient saw cardio after last admission, no acute changes.  Metformin stopped on last admission due to lactic acidosis, however followed up with PCP and restarted.  Patient finished abx course from last admission of Zithromax and completed prednisone course on Sunday.    Of note, patient was admitted to [facility] on 2/12/19 for acute on chronic hypoxic/hypercarbic respiratory failure due to COPD exacerbation. He was treated with Azithromycin, Solumedrol, inhalers, and nebulizer treatments.    In the ED, , /78, RR 18, SpO2 99% on 2L NC.  Labs showed CBC wnl, PT/INR wnl, lactate 1.0, BMP potassium 5.0, glucose 218, alk phos 135, ABG showed pCO2 76, pH 7.22, HCO3 25.  EKG: tachycardia , NSR  CXR: Hyperinflated lungs  Patient received 1L NS bolus, albuterol x1, duoneb x1, solumedrol 125mg IV x1.  Blood cultures and Flu/RSV sent from ED.     Review of Systems:  Review of Systems: Review of Systems  General: no fever, chills  Eyes: no vision changes  ENT: no hearing changes, nasal congestions, or sore throat  CV: no chest pain, + palpitations  Pulm: + SOB, no wheezing, + cough, no hemoptysis  Abd/GI: no nausea, vomiting, diarrhea, constipation, abd pain  : no dysuria, hematuria, urinary frequency  MSK: no joint pain or myalgias  Neuro: no syncope, dizziness, + generalized weakness  Psych: no anxiety or depression  Endo: no heat or cold intolerance      Allergies and Intolerances:        Allergies:  No Known Allergies:        Intolerances:  shellfish: Food, Nausea, feels nauseous when eating crabs or shrimp but no true allergic reaction    Home Medications:   * Patient Currently Takes Medications as of 23-Feb-2019 13:27 documented in Structured Notes  · valsartan 80 mg oral tablet: 1 tab(s) orally once a day, Last Dose Taken:    · Multiple Vitamins oral tablet: 1 tab(s) orally once a day, Last Dose Taken:    · metoprolol tartrate 25 mg oral tablet: 0.5 tab(s) orally 2 times a day, Last Dose Taken:    · clopidogrel 75 mg oral tablet: 1 tab(s) orally once a day, Last Dose Taken:    · aspirin 81 mg oral delayed release tablet: 1 tab(s) orally once a day, Last Dose Taken:    · atorvastatin 40 mg oral tablet: 1 tab(s) orally once a day, Last Dose Taken:    · glipiZIDE 2.5 mg oral tablet, extended release: 1 tab(s) orally 2 times a day, Last Dose Taken:    · tamsulosin 0.4 mg oral capsule: 1 cap(s) orally once a day (at bedtime), Last Dose Taken:    · Daliresp 500 mcg oral tablet: 1 tab(s) orally once a day, Last Dose Taken:    · metFORMIN 1000 mg oral tablet: 1 tab(s) orally 2 times a day, Last Dose Taken:      .    Patient History:    Past Medical History:  CAD (Coronary Artery Disease)    COPD (chronic obstructive pulmonary disease)    Diabetes Mellitus, Type II    Dyslipidemia    Hypertension    Osteomyelitis    PVD (peripheral vascular disease).     Past Surgical History:  CABG (Coronary Artery Bypass Graft)    Compound fracture  left leg.     Family History:  Sibling  Still living? Unknown  Family history of diabetes mellitus, Age at diagnosis: Age Unknown.     Social History:  Social History (marital status, living situation, occupation, tobacco use, alcohol and drug use, and sexual history): Lives with wife  Ambulates independently   Former smoker (quit 25 years ago), smoked 1PPD for 20 years   Denies EtOH use   Received flu vaccine august 2018     Tobacco Screening:  · Core Measure Site Yes  · Has the patient used tobacco in the past 30 days? No           ASSESSMENT RECOMMENDATION LUIS DANIEL TURNER 2/23/2019  ADMISSION [facility] HOSPITALIST   CAD    hx CABGx3, 10/26/2018 ECHO ef 55%  12/20/2017 ECHO  n lvsf diast dysfn ef 55%  Plavix 75 (2/23) Atorvastat 40 (2/23)  ASA 81 (2/23) Losartan 50 (2/23) Metoprolol 12.5x2 (2/23)   DM iss (2/23)  BPH Tamsulosin .4 (2/23)            INFECTION   IV FLUIDS       W  2/23/2019 W 10.4       CXR  2/23/2019 cxr hyperinflated lungs     MICROBIOLOGY          2/23/2019 fluab n rsv n       ANTIBIOTICS          Azithro (2/23) for antiinflamm effsct in COPD ex     COPD ex  Albuterol.6 (2/23/2019)   Solumed 40.2 (2/23)     TYPE 2 RESP FAILURE POA 2/23/2019 2/23/2019 2:57 PM bpap 14/6/.95054/63/67  2/23/2019 12p Placed on bpap 14/6/.24     2/23/2019 .28 722/76/147  A/R 2/23/2019 2:41 PM Monitor ABG vbg po Target po 90-95% and pH 730 If niv fails then intubation Repeat abg ordered for 2 h after first            PLAN SYNOPSIS  79 y/o male pmhx HTN, PVD, DM, Asthma, COPD on 2L home O2, hx CABGx3, 10/26/2018 ECHO ef 55%  12/20/2017 ECHO  n lvsf diast dysfn ef 55% HLD presented to ED 2/22/2019  w/ SOB and increased work of breathing.   77 yo male with PMHx of HTN, DM2 (on home Metformin and glipizide), COPD (2L home/ BIPAP at night), CAD with 3v CABG 2004, HLD, 10/26/2018 ECHO ef 55% hx hypercapnic resp failure with recent intubation c/b with cardiac arrest (12/2018), presenting to ED with SOB x 1 d and was found to be in ac on chr resp acidosis Type 2 resp failure     TYPE 2 RESP FAILURE Target PO 90-95 and pH 730 If NIV fails intubation and ICU   COPD ex BD steroids adjusted Azithro for inflammation/infection      TIME SPENT Over 55 minutes aggregate care time spent on encounter; activities included   direct patient care, counseling and/or coordinating care reviewing notes, lab data/ imaging , discussion with multidisciplinary team/ patient  /family. Risks, benefits, alternatives  discussed in detail.
79 y/o male pmhx COPD/ Asmtha, HTN, chronic hypercapnic respiratory failure w/ home O2 and BiPAP, PVD, HLD, DM2, w/ recent PEA arrest in October 2018.Admitted today w/ acute on chronic hypercapnic respiratory failure related to COPD exacerbation. Pt placed on BiPAP in ED, repeat ABG 2 hours on NIPPV improved, pt appearing comfortable only states he is claustrophobic on BiPAP. At this time he does not require an ICU admission, please reconsult if his condition deteriorates.     Case discussed w/ ICU attending Dr. Marcelino     Plan:    Resp: Acute on chronic hypercapnic respiratory failure 2/2 COPD exacerbation likely related to non compliance w/ nocturnal BiPAP. ABG improved to 7.28/63/68/25. Continue w/ NIPPV, titrate FiO2 to keep O2 sat >92%. Order placed for repeat ABG @ 17:00, will f/u. BiPAP PRN and qhs. Solumedrol 40mg q8hr. Duonebs q6hr and PRN for wheeze. Monitor on telemetry w/  . RVP panel sent.   CV: HTN, c/w home antihypertensives  - Sinus tachycardia, improving since arrival, related to increased work of breathing. tele monitor   Endo: DM2, ISS w/ hypoglycemia protocol

## 2019-02-23 NOTE — H&P ADULT - HISTORY OF PRESENT ILLNESS
77 yo male with PMHx of HTN, DM2 (on home Metformin and glipizide), COPD (2L home/ BIPAP at night), CABG, HLD, hx hypercapnic resp failure with recent intubation c/b with cardiac arrest (12/2018), presenting to ED with SOB since yesterday.      Of note, patient was admitted to [facility] on 2/12/19 for acute on chronic hypoxic/hypercarbic respiratory failure due to COPD exacerbation. He was treated with Azithromycin, Solumedrol, inhalers, and nebulizer treatments.    In the ED, , /78, RR 18, SpO2 99% on 2L NC.  Labs showed CBC wnl, PT/INR wnl, lactate 1.0, BMP potassium 5.0, glucose 218, alk phos 135, ABG showed pCO2 76, pH 7.22, HCO3 25.    Patient received 1L NS bolus, albuterol x1, duoneb x1, solumedrol 125mg IV x1.  Blood cultures and Flu/RSV sent from ED. 77 yo male with PMHx of HTN, DM2 (on home Metformin and glipizide), COPD (2L home/ BIPAP at night), CABG, HLD, hx hypercapnic resp failure with recent intubation c/b with cardiac arrest (12/2018), presenting to ED with SOB since yesterday.  Patient states that last night he was lying in bed when all of a sudden had labored breathing, he got up to use the bathroom, SOB got worse.  Patient felt palpitations at this time but no chest pain/pressure.  Patient had BM and states that it helped with SOB, however still present and so patient used home nebulizers and put on BiPAP machine, which again helped a little but did not cause symptoms to go away.  Patient got minimal sleep last night, constantly having SOB and this morning as per wife, patient was not able to get up had significant weakness and so she called 911.  Patient states SOB still present but currently on BiPAP machine, is feeling a little better.  Denies dizziness, lightheadedness, fevers, chills, nausea, chest pain, abdominal pain.  Patient does have chronic cough related to COPD however nonproductive.  Patient saw cardio after last admission, no acute changes.  Metformin stopped on last admission due to lactic acidosis, however followed up with PCP and restarted.  Patient finished abx course from last admission of Zithromax and completed prednisone course on Sunday.    Of note, patient was admitted to [facility] on 2/12/19 for acute on chronic hypoxic/hypercarbic respiratory failure due to COPD exacerbation. He was treated with Azithromycin, Solumedrol, inhalers, and nebulizer treatments.    In the ED, , /77, RR 18, SpO2 99% on 2L NC.  Labs showed CBC wnl, PT/INR wnl, lactate 1.0, BMP potassium 5.0, glucose 218, alk phos 135, ABG showed pCO2 76, pH 7.22, HCO3 25.  EKG: tachycardia , NSR  CXR: Hyperinflated lungs  Patient received 1L NS bolus, albuterol x1, duoneb x1, solumedrol 125mg IV x1.  Blood cultures and Flu/RSV sent from ED. 77 yo male with PMHx of HTN, DM2 (on home Metformin and glipizide), COPD (2L home/ BIPAP at night), CAD with 3v CABG 2004, HLD, hx hypercapnic resp failure with recent intubation c/b with cardiac arrest (12/2018), presenting to ED with SOB since yesterday.  Patient states that last night he was lying in bed when all of a sudden had labored breathing, he got up to use the bathroom, SOB got worse.  Patient felt palpitations at this time but no chest pain/pressure.  Patient had BM and states that it helped with SOB, however still present and so patient used home nebulizers and put on BiPAP machine, which again helped a little but did not cause symptoms to go away.  Patient got minimal sleep last night, constantly having SOB and this morning as per wife, patient was not able to get up had significant weakness and so she called 911.  Patient states SOB still present but currently on BiPAP machine, is feeling a little better.  Denies dizziness, lightheadedness, fevers, chills, nausea, chest pain, abdominal pain.  Patient does have chronic cough related to COPD however nonproductive.  Patient saw cardio after last admission, no acute changes.  Metformin stopped on last admission due to lactic acidosis, however followed up with PCP and restarted.  Patient finished abx course from last admission of Zithromax and completed prednisone course on Sunday.    Of note, patient was admitted to [facility] on 2/12/19 for acute on chronic hypoxic/hypercarbic respiratory failure due to COPD exacerbation. He was treated with Azithromycin, Solumedrol, inhalers, and nebulizer treatments.    In the ED, , /77, RR 18, SpO2 99% on 2L NC.  Labs showed CBC wnl, PT/INR wnl, lactate 1.0, BMP potassium 5.0, glucose 218, alk phos 135, ABG showed pCO2 76, pH 7.22, HCO3 25.  EKG: tachycardia , NSR  CXR: Hyperinflated lungs  Patient received 1L NS bolus, albuterol x1, duoneb x1, solumedrol 125mg IV x1.  Blood cultures and Flu/RSV sent from ED.

## 2019-02-23 NOTE — PATIENT PROFILE ADULT - VISION (WITH CORRECTIVE LENSES IF THE PATIENT USUALLY WEARS THEM):
wears glasses at the time/Normal vision: sees adequately in most situations; can see medication labels, newsprint

## 2019-02-23 NOTE — H&P ADULT - PROBLEM SELECTOR PLAN 4
-DMII, on home Metformin and glipizide. Hold oral home meds  -start Low dose ISS   -Hypoglycemia protocol -On home Valsartan 80mg, therapeutic interchange to Losartan 50mg daily  -On home Metoprolol tartrate 12.5mg BID, will continue with hold parameters

## 2019-02-23 NOTE — H&P ADULT - NSHPPHYSICALEXAM_GEN_ALL_CORE
Physical Exam  General: mild distress 2/2 labored breathing  Head: normocephalic, atraumatic  Eyes: EOMI, anicteric  ENT: patient on BiPAP  Heart: Regular rhythm, tachycardic, S1, S2, no murmurs  Chest: CTA b/l, no rales, rhonchi, or wheezes, no use of accessory muscles  Abd: BS+, soft, NT, ND  Extr: 1+ pitting edema b/l LE   Neuro: AA&Ox3, no focal weakness, 5/5 muscle strength x4 extremities, sensation to light touch intact  Psych: normal affect Physical Exam  General: mild distress 2/2 labored breathing  Head: normocephalic, atraumatic  Eyes: EOMI, anicteric  ENT: patient on BiPAP  Heart: Regular rhythm, tachycardic, S1, S2, no murmurs  Chest: Diminished BS b/l, no rales, rhonchi, or wheezes  Abd: BS+, soft, NT, ND  Extr: 1+ pitting edema b/l LE   Neuro: AA&Ox3, no focal weakness, 5/5 muscle strength x4 extremities, sensation to light touch intact  Psych: normal affect

## 2019-02-23 NOTE — ED ADULT NURSE NOTE - NSIMPLEMENTINTERV_GEN_ALL_ED
Implemented All Universal Safety Interventions:  [place] to call system. Call bell, personal items and telephone within reach. Instruct patient to call for assistance. Room bathroom lighting operational. Non-slip footwear when patient is off stretcher. Physically safe environment: no spills, clutter or unnecessary equipment. Stretcher in lowest position, wheels locked, appropriate side rails in place.

## 2019-02-23 NOTE — PATIENT PROFILE ADULT - FALL HARM RISK
other/general weakness coagulation(Bleeding disorder R/T clinical cond/anti-coags)/general weakness/bones(Osteoporosis,prev fx,steroid use,metastatic bone ca)

## 2019-02-24 LAB
ALBUMIN SERPL ELPH-MCNC: 3 G/DL — LOW (ref 3.3–5)
ALP SERPL-CCNC: 90 U/L — SIGNIFICANT CHANGE UP (ref 40–120)
ALT FLD-CCNC: 39 U/L — SIGNIFICANT CHANGE UP (ref 12–78)
ANION GAP SERPL CALC-SCNC: 6 MMOL/L — SIGNIFICANT CHANGE UP (ref 5–17)
AST SERPL-CCNC: 14 U/L — LOW (ref 15–37)
BASE EXCESS BLDA CALC-SCNC: 7.1 MMOL/L — HIGH (ref -2–2)
BILIRUB SERPL-MCNC: 0.3 MG/DL — SIGNIFICANT CHANGE UP (ref 0.2–1.2)
BLOOD GAS COMMENTS ARTERIAL: SIGNIFICANT CHANGE UP
BUN SERPL-MCNC: 17 MG/DL — SIGNIFICANT CHANGE UP (ref 7–23)
CALCIUM SERPL-MCNC: 9.3 MG/DL — SIGNIFICANT CHANGE UP (ref 8.5–10.1)
CHLORIDE SERPL-SCNC: 104 MMOL/L — SIGNIFICANT CHANGE UP (ref 96–108)
CO2 SERPL-SCNC: 32 MMOL/L — HIGH (ref 22–31)
CREAT SERPL-MCNC: 1.1 MG/DL — SIGNIFICANT CHANGE UP (ref 0.5–1.3)
GLUCOSE SERPL-MCNC: 266 MG/DL — HIGH (ref 70–99)
HCO3 BLDA-SCNC: 30 MMOL/L — HIGH (ref 23–27)
HCT VFR BLD CALC: 37.3 % — LOW (ref 39–50)
HGB BLD-MCNC: 11.6 G/DL — LOW (ref 13–17)
HOROWITZ INDEX BLDA+IHG-RTO: 24 — SIGNIFICANT CHANGE UP
INR BLD: 1.09 RATIO — SIGNIFICANT CHANGE UP (ref 0.88–1.16)
MCHC RBC-ENTMCNC: 28.1 PG — SIGNIFICANT CHANGE UP (ref 27–34)
MCHC RBC-ENTMCNC: 31.1 GM/DL — LOW (ref 32–36)
MCV RBC AUTO: 90.3 FL — SIGNIFICANT CHANGE UP (ref 80–100)
NRBC # BLD: 0 /100 WBCS — SIGNIFICANT CHANGE UP (ref 0–0)
PCO2 BLDA: 58 MMHG — HIGH (ref 32–46)
PH BLDA: 7.36 — SIGNIFICANT CHANGE UP (ref 7.35–7.45)
PHOSPHATE SERPL-MCNC: 2.4 MG/DL — LOW (ref 2.5–4.5)
PLATELET # BLD AUTO: 272 K/UL — SIGNIFICANT CHANGE UP (ref 150–400)
PO2 BLDA: 54 MMHG — LOW (ref 74–108)
POTASSIUM SERPL-MCNC: 4.5 MMOL/L — SIGNIFICANT CHANGE UP (ref 3.5–5.3)
POTASSIUM SERPL-SCNC: 4.5 MMOL/L — SIGNIFICANT CHANGE UP (ref 3.5–5.3)
PROCALCITONIN SERPL-MCNC: <0.05 NG/ML — HIGH (ref 0–0.04)
PROT SERPL-MCNC: 5.9 G/DL — LOW (ref 6–8.3)
PROTHROM AB SERPL-ACNC: 12.4 SEC — SIGNIFICANT CHANGE UP (ref 10–12.9)
RAPID RVP RESULT: SIGNIFICANT CHANGE UP
RBC # BLD: 4.13 M/UL — LOW (ref 4.2–5.8)
RBC # FLD: 13.2 % — SIGNIFICANT CHANGE UP (ref 10.3–14.5)
SAO2 % BLDA: 87 % — LOW (ref 92–96)
SODIUM SERPL-SCNC: 142 MMOL/L — SIGNIFICANT CHANGE UP (ref 135–145)
WBC # BLD: 10.17 K/UL — SIGNIFICANT CHANGE UP (ref 3.8–10.5)
WBC # FLD AUTO: 10.17 K/UL — SIGNIFICANT CHANGE UP (ref 3.8–10.5)

## 2019-02-24 PROCEDURE — 99233 SBSQ HOSP IP/OBS HIGH 50: CPT

## 2019-02-24 PROCEDURE — 99291 CRITICAL CARE FIRST HOUR: CPT

## 2019-02-24 RX ORDER — INSULIN GLARGINE 100 [IU]/ML
5 INJECTION, SOLUTION SUBCUTANEOUS AT BEDTIME
Qty: 0 | Refills: 0 | Status: DISCONTINUED | OUTPATIENT
Start: 2019-02-24 | End: 2019-02-26

## 2019-02-24 RX ADMIN — ALBUTEROL 2.5 MILLIGRAM(S): 90 AEROSOL, METERED ORAL at 23:42

## 2019-02-24 RX ADMIN — Medication 2: at 08:05

## 2019-02-24 RX ADMIN — CLOPIDOGREL BISULFATE 75 MILLIGRAM(S): 75 TABLET, FILM COATED ORAL at 11:39

## 2019-02-24 RX ADMIN — Medication 4: at 12:09

## 2019-02-24 RX ADMIN — Medication 40 MILLIGRAM(S): at 06:45

## 2019-02-24 RX ADMIN — ALBUTEROL 2.5 MILLIGRAM(S): 90 AEROSOL, METERED ORAL at 03:02

## 2019-02-24 RX ADMIN — Medication 3: at 17:21

## 2019-02-24 RX ADMIN — ALBUTEROL 2.5 MILLIGRAM(S): 90 AEROSOL, METERED ORAL at 11:07

## 2019-02-24 RX ADMIN — ALBUTEROL 2.5 MILLIGRAM(S): 90 AEROSOL, METERED ORAL at 15:08

## 2019-02-24 RX ADMIN — AZITHROMYCIN 255 MILLIGRAM(S): 500 TABLET, FILM COATED ORAL at 14:01

## 2019-02-24 RX ADMIN — Medication 81 MILLIGRAM(S): at 11:40

## 2019-02-24 RX ADMIN — BUDESONIDE AND FORMOTEROL FUMARATE DIHYDRATE 2 PUFF(S): 160; 4.5 AEROSOL RESPIRATORY (INHALATION) at 18:54

## 2019-02-24 RX ADMIN — Medication 12.5 MILLIGRAM(S): at 17:28

## 2019-02-24 RX ADMIN — BUDESONIDE AND FORMOTEROL FUMARATE DIHYDRATE 2 PUFF(S): 160; 4.5 AEROSOL RESPIRATORY (INHALATION) at 06:45

## 2019-02-24 RX ADMIN — TAMSULOSIN HYDROCHLORIDE 0.4 MILLIGRAM(S): 0.4 CAPSULE ORAL at 21:41

## 2019-02-24 RX ADMIN — Medication 1 TABLET(S): at 11:39

## 2019-02-24 RX ADMIN — ALBUTEROL 2.5 MILLIGRAM(S): 90 AEROSOL, METERED ORAL at 20:44

## 2019-02-24 RX ADMIN — ATORVASTATIN CALCIUM 40 MILLIGRAM(S): 80 TABLET, FILM COATED ORAL at 21:41

## 2019-02-24 RX ADMIN — ENOXAPARIN SODIUM 40 MILLIGRAM(S): 100 INJECTION SUBCUTANEOUS at 11:40

## 2019-02-24 RX ADMIN — TIOTROPIUM BROMIDE 1 CAPSULE(S): 18 CAPSULE ORAL; RESPIRATORY (INHALATION) at 06:45

## 2019-02-24 RX ADMIN — INSULIN GLARGINE 5 UNIT(S): 100 INJECTION, SOLUTION SUBCUTANEOUS at 21:41

## 2019-02-24 RX ADMIN — ALBUTEROL 2.5 MILLIGRAM(S): 90 AEROSOL, METERED ORAL at 00:43

## 2019-02-24 RX ADMIN — ALBUTEROL 2.5 MILLIGRAM(S): 90 AEROSOL, METERED ORAL at 08:01

## 2019-02-24 NOTE — PROGRESS NOTE ADULT - ASSESSMENT
77 yo M with Hx asthma, COPD with frequent admissions to this and other hospitals, chronic hypoxemic respiratory failure, CAD with 3v CABG 2004, without any other more recent cardiac issue.  His wife reports a normal stress test in 8/2018.  He has peripheral vascular disease status post stents years ago, which have been found patent.  He was here in October with resp failure, complicated by a bradycardic/PEA arrest. Echo during that hospitalization revealed a normal lvef without significant valve disease. He was admitted again on several other recent occasions with dyspnea requiring admission.    He presents along with his wife for dyspnea, refractory to the use of nebs and bipap at home.  He was most recently seen here 2/12/19 for acute on chronic hypoxic/hypercarbic respiratory failure due to COPD exacerbation. He was treated with Azithromycin, Solumedrol, inhalers, and nebulizer treatments.    His course has been notable for severe acute resp acidosis.  He remains on bipap.    -there is no evidence of acute ischemia.  -he does have a hx of cad s/p remote cabg  -cont dapt, cont statin  -cont bb as tolerated    -he is tachycardic, reactive to the severity of his acute illness  -he has had pea arrest related to severe acute hypercarbic resp failure in the past, and is at risk of recurrence  -monitor carefully    -there is no evidence for meaningful  volume overload.  -normal ef 10/2018, no need to repeat at this time  -no pasp was estimated at that time, but repetition of the echo is low yield    -DVT prophylaxis  -Monitor and replete lytes, keep K>4 and Mg >2  - Further cardiac workup will depend on clinical course.   - All other workup per primary team  Thank you for the consult  Will continue to follow  Austin GUY  Cardiology 79 yo M with Hx asthma, COPD with frequent admissions to this and other hospitals, chronic hypoxemic respiratory failure, CAD with 3v CABG 2004, without any other more recent cardiac issue.  His wife reports a normal stress test in 8/2018.  He has peripheral vascular disease status post stents years ago, which have been found patent.  He was here in October with resp failure, complicated by a bradycardic/PEA arrest. Echo during that hospitalization revealed a normal lvef without significant valve disease. He was admitted again on several other recent occasions with dyspnea requiring admission.    He presents along with his wife for dyspnea, refractory to the use of nebs and bipap at home.  He was most recently seen here 2/12/19 for acute on chronic hypoxic/hypercarbic respiratory failure due to COPD exacerbation. He was treated with Azithromycin, Solumedrol, inhalers, and nebulizer treatments.    His course has been notable for severe acute resp acidosis.  He remains with severe respiratory insufficiency and remains on bipap.  He has been unable to come off bipap since admission.    -there is no evidence of acute ischemia.  -he does have a hx of cad s/p remote cabg  -cont dapt, cont statin  -cont bb as tolerated    -he is less tachycardic, reactive to the severity of his acute illness  -he has had pea arrest related to severe acute hypercarbic resp failure in the past, and is at risk of recurrence  -monitor carefully    -there is no evidence for meaningful  volume overload.  -normal ef 10/2018, no need to repeat at this time  -no pasp was estimated at that time, but repetition of the echo is low yield    -DVT prophylaxis  -Monitor and replete lytes, keep K>4 and Mg >2  - Further cardiac workup will depend on clinical course.   - All other workup per primary team  Thank you for the consult  Will continue to follow  Austin GUY  Cardiology 79 yo M with Hx asthma, COPD with frequent admissions to this and other hospitals, chronic hypoxemic respiratory failure, CAD with 3v CABG 2004, without any other more recent cardiac issue.  His wife reports a normal stress test in 8/2018.  He has peripheral vascular disease status post stents years ago, which have been found patent.  He was here in October with resp failure, complicated by a bradycardic/PEA arrest. Echo during that hospitalization revealed a normal lvef without significant valve disease. He was admitted again on several other recent occasions with dyspnea requiring admission.    He presents along with his wife for dyspnea, refractory to the use of nebs and bipap at home.  He was most recently seen here 2/12/19 for acute on chronic hypoxic/hypercarbic respiratory failure due to COPD exacerbation. He was treated with Azithromycin, Solumedrol, inhalers, and nebulizer treatments.    His course has been notable for severe acute resp acidosis.  He remains with severe respiratory insufficiency and remains on bipap.  He has been unable to come off bipap since admission.    -there is no evidence of acute ischemia.  -he does have a hx of cad s/p remote cabg  -cont dapt, cont statin  -cont bb as tolerated    -he is less tachycardic, reactive to the severity of his acute illness  -he has had pea arrest related to severe acute hypercarbic resp failure in the past, and is at risk of recurrence  -monitor carefully    -there is no evidence for meaningful  volume overload.  -normal ef 10/2018, no need to repeat at this time  -no pasp was estimated at that time, but repetition of the echo is low yield    -DVT prophylaxis  -Monitor and replete lytes, keep K>4 and Mg >2  - Further cardiac workup will depend on clinical course.      -noting persistent resp failure requiring bipap, he remains at risk of abrupt decompensation. 35 minutes was spent  	  Austin GUY  Cardiology

## 2019-02-24 NOTE — PROGRESS NOTE ADULT - SUBJECTIVE AND OBJECTIVE BOX
[facility] -- Saud Aguilar, Génesis Louise Pannella, Patel, Savella  Office # [phone]      Follow Up:    SOB  Subjective/Observations:   No events overnight resting comfortably in bed.  No complaints of chest pain,  or palpitations reported.  On Bipap overnight still w/ dyspnea    REVIEW OF SYSTEMS: All other review of systems is negative unless indicated above    PAST MEDICAL & SURGICAL HISTORY:  PVD (peripheral vascular disease)  Hypertension  COPD (chronic obstructive pulmonary disease)  Osteomyelitis  Dyslipidemia  CAD (Coronary Artery Disease)  Diabetes Mellitus, Type II  Compound fracture: left leg  CABG (Coronary Artery Bypass Graft)      MEDICATIONS  (STANDING):  ALBUTerol    0.083% 2.5 milliGRAM(s) Nebulizer every 4 hours  aspirin enteric coated 81 milliGRAM(s) Oral daily  atorvastatin 40 milliGRAM(s) Oral at bedtime  azithromycin  IVPB      azithromycin  IVPB 500 milliGRAM(s) IV Intermittent every 24 hours  buDESOnide 160 MICROgram(s)/formoterol 4.5 MICROgram(s) Inhaler 2 Puff(s) Inhalation two times a day  clopidogrel Tablet 75 milliGRAM(s) Oral daily  dextrose 5%. 1000 milliLiter(s) (50 mL/Hr) IV Continuous <Continuous>  dextrose 50% Injectable 12.5 Gram(s) IV Push once  dextrose 50% Injectable 25 Gram(s) IV Push once  dextrose 50% Injectable 25 Gram(s) IV Push once  enoxaparin Injectable 40 milliGRAM(s) SubCutaneous daily  insulin lispro (HumaLOG) corrective regimen sliding scale   SubCutaneous three times a day before meals  insulin lispro (HumaLOG) corrective regimen sliding scale   SubCutaneous at bedtime  losartan 50 milliGRAM(s) Oral daily  methylPREDNISolone sodium succinate Injectable 40 milliGRAM(s) IV Push every 12 hours  metoprolol tartrate 12.5 milliGRAM(s) Oral two times a day  multivitamin 1 Tablet(s) Oral daily  tamsulosin 0.4 milliGRAM(s) Oral at bedtime  tiotropium 18 MICROgram(s) Capsule 1 Capsule(s) Inhalation daily    MEDICATIONS  (PRN):  dextrose 40% Gel 15 Gram(s) Oral once PRN Blood Glucose LESS THAN 70 milliGRAM(s)/deciliter  glucagon  Injectable 1 milliGRAM(s) IntraMuscular once PRN Glucose LESS THAN 70 milligrams/deciliter      Allergies    No Known Allergies    Intolerances    shellfish (Nausea)      Vital Signs Last 24 Hrs  T(C): 36.4 (24 Feb 2019 07:11), Max: 36.6 (23 Feb 2019 21:34)  T(F): 97.5 (24 Feb 2019 07:11), Max: 97.9 (23 Feb 2019 21:34)  HR: 84 (24 Feb 2019 11:08) (78 - 125)  BP: 116/74 (24 Feb 2019 07:11) (108/66 - 145/78)  BP(mean): --  RR: 16 (24 Feb 2019 07:11) (15 - 20)  SpO2: 99% (24 Feb 2019 11:08) (96% - 113%)    I&O's Summary    24 Feb 2019 07:01  -  24 Feb 2019 11:11  --------------------------------------------------------  IN: 0 mL / OUT: 300 mL / NET: -300 mL          PHYSICAL EXAM:  TELE: NSR  Constitutional: NAD, awake and alert, well-developed  HEENT: Moist Mucous Membranes, Anicteric  Pulmonary: Non-labored, breath sounds are clear bilaterally, No wheezing, crackles or rhonchi  Cardiovascular: Regular, S1 and S2 nl, No murmurs, rubs, gallops or clicks  Gastrointestinal: Bowel Sounds present, soft, nontender.   Lymph: No lymphadenopathy. No peripheral edema.  Skin: No visible rashes or ulcers.  Psych:  Mood & affect appropriate    LABS: All Labs Reviewed:                        11.6   10.17 )-----------( 272      ( 24 Feb 2019 05:51 )             37.3                         13.8   10.46 )-----------( 346      ( 23 Feb 2019 11:19 )             45.8     24 Feb 2019 05:51    142    |  104    |  17     ----------------------------<  266    4.5     |  32     |  1.10   23 Feb 2019 11:19    140    |  102    |  13     ----------------------------<  218    5.0     |  31     |  1.30     Ca    9.3        24 Feb 2019 05:51  Ca    9.5        23 Feb 2019 11:19  Phos  2.4       24 Feb 2019 05:51    TPro  5.9    /  Alb  3.0    /  TBili  0.3    /  DBili  x      /  AST  14     /  ALT  39     /  AlkPhos  90     24 Feb 2019 05:51  TPro  7.5    /  Alb  3.8    /  TBili  0.3    /  DBili  x      /  AST  28     /  ALT  51     /  AlkPhos  135    23 Feb 2019 11:19    PT/INR - ( 24 Feb 2019 05:51 )   PT: 12.4 sec;   INR: 1.09 ratio         PTT - ( 23 Feb 2019 11:19 )  PTT:33.7 sec  CARDIAC MARKERS ( 23 Feb 2019 11:19 )  <.015 ng/mL / x     / 116 U/L / x     / x      RADIOLOGY:  < from: TTE Echo Doppler w/o Cont (10.26.18 @ 19:52) >     EXAM:  ECHO TTE W/O CON COMP W/DOPPLR         PROCEDURE DATE:  10/26/2018        INTERPRETATION:  Ordering Physician: RICKEY TRUJILLO    Indication: Cardiac arrest    Technician: BALDOMERO    Study Quality: Poor given that the patient was supine in the ICU   A complete echocardiographic study was performed utilizing standard   protocol including spectral and color Doppler in all echocardiographic   windows.    Height: 180 cm  Weight: 99 kg  Blood Pressure: 54/74    MEASUREMENTS  IVS: 0.99 cm  PWT: 0.78cm  LA: 3.4cm  AO: 2.9cm  LVIDd: Unable to measured given poor endocardial border definition  LVIDs: Unable to measure given poor endocardial border definition      LVEF: Visually estimated in the LV short axis view (best images of the   LV) is 55-60 %  RVSP: Unable to assess given lack of adequate TR waveform  RA Pressure: 15 mmHg  IVC: Dilated at 2.4 cm in diameter and collapses less than 50% with   inspiration (not valid if patient is intubated and on mechanical   ventilatory support)    FINDINGS  Left Ventricle: Over the left ventricle is poorly visualized. There was   best visualized in the LV short axis view. The visual estimation of the   ejection fraction is 55-60%. I'm unable to rule out regional wall motion   abnormalities given the poor acoustic windows.  Right Ventricle: Overall poorly visualized  Left Atrium: Grossly normal in size  Right Atrium: Overall poorly visualized  Mitral Valve: Normal in structure with trace mitral valve regurgitation  Aortic Valve: Trileaflet and sclerotic with no evidence of significant   insufficiency. No stenosis  Tricuspid Valve: Overall poorly visualized. There was trace tricuspid   valve regurgitation  Pulmonic Valve: Poorly visualized and poor Doppler interrogation  Diastolic Function: The LV diastolic function is indeterminate in this   study  Pericardium/Pleura: No significant pericardial or pleural effusions noted      CONCLUSIONS:  1. Normal technically limited study.  2. The left ventricle was best visualized in the shortaxis view. The LV   systolic function in that view appears preserved with a visually   estimated ejection fraction of 55-60%.  3. I'm unable to rule out regional wall motion abnormalities given the   poor endomyocardial border definition.  4. Normal mitral valve with trace regurgitation.  5. Sclerotic trileaflet aortic valve with no insufficiency or stenosis.  6. Poorly visualized right-sided chambers.  7. Limited study to assess pulmonary artery systolic pressure.                  MAC TAYLOR   This document has been electronically signed. Oct 27 2018 11:31AM                < end of copied text >    EKG: st              [facility]   Cardiology

## 2019-02-24 NOTE — PROGRESS NOTE ADULT - PROBLEM SELECTOR PLAN 1
-admit to tele  -likely secondary to COPD exacerbation  -Monitor ABG closely  -Flu/RSV panel negative, f/u RVP panel  -f/u BCx  -Continue bipap qhs and Nasal cannula. Patients wife unsure if home Bipap working well, will call [facility] to check bipap   -Continue IV solumedrol 40mg BID  -Continue with duonebs q6hrs  -Pulm, Dr. Dejesus, consulted, f/u recs

## 2019-02-24 NOTE — PROGRESS NOTE ADULT - SUBJECTIVE AND OBJECTIVE BOX
INTERVAL HPI/OVERNIGHT EVENTS: Patient seen and examined at bedside. No acute events overnight. Spoke to patient and wife at bedside. Overall patient feels much better today. Wife is concerned the bipap machine at home may not be working properly.     MEDICATIONS  (STANDING):  ALBUTerol    0.083% 2.5 milliGRAM(s) Nebulizer every 4 hours  aspirin enteric coated 81 milliGRAM(s) Oral daily  atorvastatin 40 milliGRAM(s) Oral at bedtime  azithromycin  IVPB      azithromycin  IVPB 500 milliGRAM(s) IV Intermittent every 24 hours  buDESOnide 160 MICROgram(s)/formoterol 4.5 MICROgram(s) Inhaler 2 Puff(s) Inhalation two times a day  clopidogrel Tablet 75 milliGRAM(s) Oral daily  dextrose 5%. 1000 milliLiter(s) (50 mL/Hr) IV Continuous <Continuous>  dextrose 50% Injectable 12.5 Gram(s) IV Push once  dextrose 50% Injectable 25 Gram(s) IV Push once  dextrose 50% Injectable 25 Gram(s) IV Push once  enoxaparin Injectable 40 milliGRAM(s) SubCutaneous daily  insulin glargine Injectable (LANTUS) 5 Unit(s) SubCutaneous at bedtime  insulin lispro (HumaLOG) corrective regimen sliding scale   SubCutaneous three times a day before meals  insulin lispro (HumaLOG) corrective regimen sliding scale   SubCutaneous at bedtime  losartan 50 milliGRAM(s) Oral daily  methylPREDNISolone sodium succinate Injectable 40 milliGRAM(s) IV Push every 12 hours  metoprolol tartrate 12.5 milliGRAM(s) Oral two times a day  multivitamin 1 Tablet(s) Oral daily  tamsulosin 0.4 milliGRAM(s) Oral at bedtime  tiotropium 18 MICROgram(s) Capsule 1 Capsule(s) Inhalation daily    MEDICATIONS  (PRN):  dextrose 40% Gel 15 Gram(s) Oral once PRN Blood Glucose LESS THAN 70 milliGRAM(s)/deciliter  glucagon  Injectable 1 milliGRAM(s) IntraMuscular once PRN Glucose LESS THAN 70 milligrams/deciliter        Allergies    No Known Allergies    Intolerances    shellfish (Nausea)      CONSTITUTIONAL: No weakness, fevers or chills  RESPIRATORY: No cough, wheezing, hemoptysis; No shortness of breath  Cvs: No chest pain or palpitations  Gi: No abdominal pain. No nausea, vomiting, diarrhea or constipation. No melena or hematochezia.  Neuro: no weakness    All other review of systems is negative unless indicated above.    Vital Signs Last 24 Hrs  T(C): 36.4 (24 Feb 2019 11:51), Max: 36.6 (23 Feb 2019 21:34)  T(F): 97.5 (24 Feb 2019 11:51), Max: 97.9 (23 Feb 2019 21:34)  HR: 94 (24 Feb 2019 11:51) (78 - 118)  BP: 118/71 (24 Feb 2019 11:51) (108/66 - 135/67)  BP(mean): --  RR: 17 (24 Feb 2019 11:51) (15 - 20)  SpO2: 97% (24 Feb 2019 11:51) (96% - 100%)    General: NAD  Neurology: A&Ox3 , nonfocal  Respiratory: expiratory wheezing   CV: RRR, S1S2  Abdominal: Soft, NT, ND +BS  Extremities: No edema, + peripheral pulses      LABS:                        11.6   10.17 )-----------( 272      ( 24 Feb 2019 05:51 )             37.3     02-24    142  |  104  |  17  ----------------------------<  266<H>  4.5   |  32<H>  |  1.10    Ca    9.3      24 Feb 2019 05:51  Phos  2.4     02-24    TPro  5.9<L>  /  Alb  3.0<L>  /  TBili  0.3  /  DBili  x   /  AST  14<L>  /  ALT  39  /  AlkPhos  90  02-24    PT/INR - ( 24 Feb 2019 05:51 )   PT: 12.4 sec;   INR: 1.09 ratio         PTT - ( 23 Feb 2019 11:19 )  PTT:33.7 sec      RADIOLOGY & ADDITIONAL TESTS:

## 2019-02-24 NOTE — PROGRESS NOTE ADULT - ASSESSMENT
77 yo male with PMHx of HTN, DM2 (on home Metformin and glipizide), COPD (2L home/ BIPAP at night), CAD with 3v CABG 2004, HLD, hx hypercapnic resp failure with recent intubation c/b with cardiac arrest (12/2018), presenting to ED with SOB since yesterday admitted for COPD exacerbation

## 2019-02-24 NOTE — PROGRESS NOTE ADULT - PROBLEM SELECTOR PLAN 5
-DMII, on home Metformin and glipizide. Hold oral home meds  -start Low dose ISS, lantus  -Hypoglycemia protocol

## 2019-02-24 NOTE — PROGRESS NOTE ADULT - SUBJECTIVE AND OBJECTIVE BOX
Patient was examined Chart reviewed DW staff Detailed note will be inserted below after ghoing over latest data Meanwhile continue management as per recommendations in my previous note    dw spouse at bedside    abg is better  Looks better Patient was examined Chart reviewed DW staff Detailed note will be inserted below after ghoing over latest data Meanwhile continue management as per recommendations in my previous note    dw spouse at bedside    abg is better  Looks better        COMMODORE TURNER [facility]    ALLERGY Shellfish  CONTACT Sp Amira C    REASON FOR VISIT Subsequent pulm fu   Initial evaluation/Pulmonary consultation requested  2/23/2019 from Dr Dejesus by Dr Hughes    Patient examined chart reviewed  HOSPITAL ADMISSION [facility] P Dr Ernie Dasilva 2/23/2019  PATIENT CAME  FROM (if information available)      VITALS/LABS         2/24/2019 afeb 95 103/65 17 100%   2/24/2019 6a bpap 14/6/.24   2/24/2019 W 10.1 Hb 11.6 Plt 272  Na 142 K 4.5 CO2 32 Cr 1.1     REVIEW OF SYMPTOMS    Able to give ROS  Yes     RELIABLE No   CONSTITUTIONAL Weakness Yes  Chills No Vision changes No  ENDOCRINE No unexplained hair loss No heat or cold intolerance    ALLERGY No hives  Sore throat No   RESP Coughing blood no  Shortness of breath YES   NEURO No Headache  Confusion Pain neck No   CARDIAC No Chest pain No Palpitations   GI No Pain abdomen NO   Vomiting NO     PHYSICAL EXAM    HEENT Unremarkable PERRLA atraumatic   RESP Fair air entry EXP prolonged    Harsh breath sound Resp distres mild   CARDIAC S1 S2 No S3     NO JVD    ABDOMEN SOFT BS PRESENT NOT DISTENDED No hepatosplenomegaly PEDAL EDEMA present No calf tenderness  NO rash   GENERAL Not TOXIC looking    GLOBAL ISSUE/BEST PRACTICE:      PROBLEM: HOB elevation:   y            PROBLEM: Stress ulcer proph:    na                      PROBLEM: VTE prophylaxis:      lvnx 40 (2/23)   PROBLEM: Glycemic control:    iss (2/23)  PROBLEM: Nutrition:    DASH (2/23)   PROBLEM: Advanced directive: na     PROBLEM: Allergies:  na    DESCRIPTION ASSESSMENT RECOMMENDATION PATIENT COMMODORE TURNER 2/23/2019  ADMISSION [facility] HOSPITALIST   77 y/o male COPD was admitted 2/22/2019  w/ SOB and increased work of breathing.   PMH HTN, DM2 (on home Metformin and glipizide), COPD (2L home/ BIPAP at night), CAD with 3v CABG 2004, HLD, 10/26/2018 ECHO ef 55% hx hypercapnic resp failure with recent intubation c/b with cardiac arrest (12/2018), presenting to ED with SOB x 1 d and was found to be in ac on chr resp acidosis Type 2 resp failure   HOSPITAL COURSE   CAD    hx CABGx3, 10/26/2018 ECHO ef 55%  12/20/2017 ECHO  n lvsf diast dysfn ef 55%  Plavix 75 (2/23) Atorvastat 40 (2/23)  ASA 81 (2/23) Losartan 50 (2/23) Metoprolol 12.5x2 (2/23) were continued  DM iss (2/23)  BPH Tamsulosin .4 (2/23)            PLAN SYNOPSIS  TYPE 2 RESP FAILURE Target PO 90-95   2/24/2019 bpap 14/6/.24 736/58/54   2/24/2019 Increased fio2 to 28  Gas exchange improved   2/24/2019 dw family re pulm rehab Gave scrip   COPD ex BD steroids adjusted Azithro for inflammation/infection 2/23 qtc 479  RO DVT 2/23 V duplex n     TIME SPENT Over 25 minutes aggregate care time spent on encounter; activities included   direct patient care, counseling and/or coordinating care reviewing notes, lab data/ imaging , discussion with multidisciplinary team/ patient  /family. Risks, benefits, alternatives  discussed in detail.

## 2019-02-25 ENCOUNTER — TRANSCRIPTION ENCOUNTER (OUTPATIENT)
Age: 79
End: 2019-02-25

## 2019-02-25 LAB
ANION GAP SERPL CALC-SCNC: 6 MMOL/L — SIGNIFICANT CHANGE UP (ref 5–17)
BUN SERPL-MCNC: 14 MG/DL — SIGNIFICANT CHANGE UP (ref 7–23)
CALCIUM SERPL-MCNC: 9.4 MG/DL — SIGNIFICANT CHANGE UP (ref 8.5–10.1)
CHLORIDE SERPL-SCNC: 102 MMOL/L — SIGNIFICANT CHANGE UP (ref 96–108)
CO2 SERPL-SCNC: 35 MMOL/L — HIGH (ref 22–31)
CREAT SERPL-MCNC: 1.1 MG/DL — SIGNIFICANT CHANGE UP (ref 0.5–1.3)
EOSINOPHIL # BLD: 10 /UL — LOW (ref 50–350)
GLUCOSE SERPL-MCNC: 185 MG/DL — HIGH (ref 70–99)
HCT VFR BLD CALC: 37.3 % — LOW (ref 39–50)
HGB BLD-MCNC: 11.5 G/DL — LOW (ref 13–17)
IGE SERPL-ACNC: 116 IU/ML — SIGNIFICANT CHANGE UP
MCHC RBC-ENTMCNC: 27.8 PG — SIGNIFICANT CHANGE UP (ref 27–34)
MCHC RBC-ENTMCNC: 30.8 GM/DL — LOW (ref 32–36)
MCV RBC AUTO: 90.1 FL — SIGNIFICANT CHANGE UP (ref 80–100)
NRBC # BLD: 0 /100 WBCS — SIGNIFICANT CHANGE UP (ref 0–0)
PLATELET # BLD AUTO: 242 K/UL — SIGNIFICANT CHANGE UP (ref 150–400)
POTASSIUM SERPL-MCNC: 4 MMOL/L — SIGNIFICANT CHANGE UP (ref 3.5–5.3)
POTASSIUM SERPL-SCNC: 4 MMOL/L — SIGNIFICANT CHANGE UP (ref 3.5–5.3)
RBC # BLD: 4.14 M/UL — LOW (ref 4.2–5.8)
RBC # FLD: 13.2 % — SIGNIFICANT CHANGE UP (ref 10.3–14.5)
SODIUM SERPL-SCNC: 143 MMOL/L — SIGNIFICANT CHANGE UP (ref 135–145)
WBC # BLD: 9.25 K/UL — SIGNIFICANT CHANGE UP (ref 3.8–10.5)
WBC # FLD AUTO: 9.25 K/UL — SIGNIFICANT CHANGE UP (ref 3.8–10.5)

## 2019-02-25 PROCEDURE — 99232 SBSQ HOSP IP/OBS MODERATE 35: CPT

## 2019-02-25 PROCEDURE — 99233 SBSQ HOSP IP/OBS HIGH 50: CPT | Mod: GC

## 2019-02-25 RX ORDER — AZITHROMYCIN 500 MG/1
500 TABLET, FILM COATED ORAL EVERY 24 HOURS
Qty: 0 | Refills: 0 | Status: DISCONTINUED | OUTPATIENT
Start: 2019-02-26 | End: 2019-02-26

## 2019-02-25 RX ADMIN — INSULIN GLARGINE 5 UNIT(S): 100 INJECTION, SOLUTION SUBCUTANEOUS at 22:43

## 2019-02-25 RX ADMIN — Medication 81 MILLIGRAM(S): at 12:07

## 2019-02-25 RX ADMIN — BUDESONIDE AND FORMOTEROL FUMARATE DIHYDRATE 2 PUFF(S): 160; 4.5 AEROSOL RESPIRATORY (INHALATION) at 06:57

## 2019-02-25 RX ADMIN — Medication 40 MILLIGRAM(S): at 06:57

## 2019-02-25 RX ADMIN — TAMSULOSIN HYDROCHLORIDE 0.4 MILLIGRAM(S): 0.4 CAPSULE ORAL at 22:43

## 2019-02-25 RX ADMIN — ALBUTEROL 2.5 MILLIGRAM(S): 90 AEROSOL, METERED ORAL at 19:41

## 2019-02-25 RX ADMIN — AZITHROMYCIN 255 MILLIGRAM(S): 500 TABLET, FILM COATED ORAL at 16:57

## 2019-02-25 RX ADMIN — Medication 12.5 MILLIGRAM(S): at 06:57

## 2019-02-25 RX ADMIN — ATORVASTATIN CALCIUM 40 MILLIGRAM(S): 80 TABLET, FILM COATED ORAL at 22:43

## 2019-02-25 RX ADMIN — Medication 2: at 08:53

## 2019-02-25 RX ADMIN — ALBUTEROL 2.5 MILLIGRAM(S): 90 AEROSOL, METERED ORAL at 22:41

## 2019-02-25 RX ADMIN — Medication 3: at 16:57

## 2019-02-25 RX ADMIN — ALBUTEROL 2.5 MILLIGRAM(S): 90 AEROSOL, METERED ORAL at 03:56

## 2019-02-25 RX ADMIN — LOSARTAN POTASSIUM 50 MILLIGRAM(S): 100 TABLET, FILM COATED ORAL at 06:56

## 2019-02-25 RX ADMIN — Medication 12.5 MILLIGRAM(S): at 18:01

## 2019-02-25 RX ADMIN — Medication 1: at 22:43

## 2019-02-25 RX ADMIN — ALBUTEROL 2.5 MILLIGRAM(S): 90 AEROSOL, METERED ORAL at 07:39

## 2019-02-25 RX ADMIN — TIOTROPIUM BROMIDE 1 CAPSULE(S): 18 CAPSULE ORAL; RESPIRATORY (INHALATION) at 06:58

## 2019-02-25 RX ADMIN — BUDESONIDE AND FORMOTEROL FUMARATE DIHYDRATE 2 PUFF(S): 160; 4.5 AEROSOL RESPIRATORY (INHALATION) at 20:19

## 2019-02-25 RX ADMIN — Medication 6: at 12:06

## 2019-02-25 RX ADMIN — ALBUTEROL 2.5 MILLIGRAM(S): 90 AEROSOL, METERED ORAL at 11:53

## 2019-02-25 RX ADMIN — ENOXAPARIN SODIUM 40 MILLIGRAM(S): 100 INJECTION SUBCUTANEOUS at 12:07

## 2019-02-25 RX ADMIN — CLOPIDOGREL BISULFATE 75 MILLIGRAM(S): 75 TABLET, FILM COATED ORAL at 12:07

## 2019-02-25 RX ADMIN — ALBUTEROL 2.5 MILLIGRAM(S): 90 AEROSOL, METERED ORAL at 15:37

## 2019-02-25 RX ADMIN — Medication 1 TABLET(S): at 12:07

## 2019-02-25 NOTE — PROGRESS NOTE ADULT - SUBJECTIVE AND OBJECTIVE BOX
Patient was examined Chart reviewed DW staff Detailed note will be inserted below after going over latest data Meanwhile continue management as per recommendations in my previous note     CO pain nose from bpap   Wearing bpap this am   Feels that his breathing is better    No cp No palpitations Patient was examined Chart reviewed  staff Detailed note will be inserted below after going over latest data Meanwhile continue management as per recommendations in my previous note     CO pain nose from bpap   Wearing bpap this am   Feels that his breathing is better    No cp No palpitations         COM[facility]ORE TURNER [facility]    ALLERGY Shellfish  CONTACT Sp Amira C    REASON FOR VISIT Subsequent pulm fu   Initial evaluation/Pulmonary consultation requested  2/23/2019 from Dr Dejesus by Dr Hughes    Patient examined chart reviewed  HOSPITAL ADMISSION [facility] P Dr Ernie Dasilva 2/23/2019  PATIENT CAME  FROM (if information available)      VITALS/LABS         2/25/2019 afeb 89 114/70 18 100%   2/25/2019 w 9.2 Hb 11.5 Plt 242 Na 143 K 4 CO2 35 Cr 1.1  2/25/2019 14/6/.24     REVIEW OF SYMPTOMS    Able to give ROS  Yes     RELIABLE No   CONSTITUTIONAL Weakness Yes  Chills No Vision changes No  ENDOCRINE No unexplained hair loss No heat or cold intolerance    ALLERGY No hives  Sore throat No   RESP Coughing blood no  Shortness of breath YES   NEURO No Headache  Confusion Pain neck No   CARDIAC No Chest pain No Palpitations   GI No Pain abdomen NO   Vomiting NO     PHYSICAL EXAM    HEENT Unremarkable PERRLA atraumatic   RESP Fair air entry EXP prolonged    Harsh breath sound Resp distres mild   CARDIAC S1 S2 No S3     NO JVD    ABDOMEN SOFT BS PRESENT NOT DISTENDED No hepatosplenomegaly PEDAL EDEMA present No calf tenderness  NO rash   GENERAL Not TOXIC looking    GLOBAL ISSUE/BEST PRACTICE:      PROBLEM: HOB elevation:   y            PROBLEM: Stress ulcer proph:    na                      PROBLEM: VTE prophylaxis:      lvnx 40 (2/23)   PROBLEM: Glycemic control:    iss (2/23)  PROBLEM: Nutrition:    DASH (2/23)   PROBLEM: Advanced directive: na     PROBLEM: Allergies:  na    DESCRIPTION ASSESSMENT RECOMMENDATION PATIENT COMMODORE YUE 2/23/2019  ADMISSION [facility] HOSPITALIST   79 y/o male COPD was admitted 2/22/2019  w/ SOB and increased work of breathing.   PMH HTN, DM2 (on home Metformin and glipizide), COPD (2L home/ BIPAP at night), CAD with 3v CABG 2004, HLD, 10/26/2018 ECHO ef 55% hx hypercapnic resp failure with recent intubation c/b with cardiac arrest (12/2018), presenting to ED with SOB x 1 d and was found to be in ac on chr resp acidosis Type 2 resp failure   HOSPITAL COURSE   CAD    hx CABG x3, 10/26/2018 ECHO ef 55%  12/20/2017 ECHO  n lvsf diast dysfn ef 55%  Plavix 75 (2/23) Atorvastat 40 (2/23)  ASA 81 (2/23) Losartan 50 (2/23) Metoprolol 12.5x2 (2/23) were continued No active ischemia noted   DM iss (2/23)  BPH Tamsulosin .4 (2/23)            PLAN SYNOPSIS  TYPE 2 RESP FAILURE Target PO 90-95   2/24/2019 bpap 14/6/.24 736/58/54   2/24/2019 Increased fio2 to 28  Gas exchange improved   2/24/2019 dw family re pulm rehab Gave scrip   COPD ex BD steroids adjusted Azithro for inflammation/infection 2/23 qtc 479  RO DVT 2/23 V duplex n   DISPO DC planning on po pred taper 30 mg over 6 d       TIME SPENT Over 25 minutes aggregate care time spent on encounter; activities included   direct patient care, counseling and/or coordinating care reviewing notes, lab data/ imaging , discussion with multidisciplinary team/ patient  /family. Risks, benefits, alternatives  discussed in detail.

## 2019-02-25 NOTE — PROGRESS NOTE ADULT - SUBJECTIVE AND OBJECTIVE BOX
[facility] -- Saud Aguilar, Génesis Louise Pannella, Patel, Savella  Office # [phone]      Follow Up:  SOB    Subjective/Observations: Seen and examined.   Pt sitting in bed tolerated bipap through the night with pain in bridge of nose when bipap removed (no breakdown guard in place).  Denies cp , sob or palpitations.  States he slept well.  NAD.        REVIEW OF SYSTEMS: All other review of systems is negative unless indicated above    PAST MEDICAL & SURGICAL HISTORY:  PVD (peripheral vascular disease)  Hypertension  COPD (chronic obstructive pulmonary disease)  Osteomyelitis  Dyslipidemia  CAD (Coronary Artery Disease)  Diabetes Mellitus, Type II  Compound fracture: left leg  CABG (Coronary Artery Bypass Graft)      MEDICATIONS  (STANDING):  ALBUTerol    0.083% 2.5 milliGRAM(s) Nebulizer every 4 hours  aspirin enteric coated 81 milliGRAM(s) Oral daily  atorvastatin 40 milliGRAM(s) Oral at bedtime  azithromycin  IVPB      azithromycin  IVPB 500 milliGRAM(s) IV Intermittent every 24 hours  buDESOnide 160 MICROgram(s)/formoterol 4.5 MICROgram(s) Inhaler 2 Puff(s) Inhalation two times a day  clopidogrel Tablet 75 milliGRAM(s) Oral daily  dextrose 5%. 1000 milliLiter(s) (50 mL/Hr) IV Continuous <Continuous>  dextrose 50% Injectable 12.5 Gram(s) IV Push once  dextrose 50% Injectable 25 Gram(s) IV Push once  dextrose 50% Injectable 25 Gram(s) IV Push once  enoxaparin Injectable 40 milliGRAM(s) SubCutaneous daily  insulin glargine Injectable (LANTUS) 5 Unit(s) SubCutaneous at bedtime  insulin lispro (HumaLOG) corrective regimen sliding scale   SubCutaneous three times a day before meals  insulin lispro (HumaLOG) corrective regimen sliding scale   SubCutaneous at bedtime  losartan 50 milliGRAM(s) Oral daily  methylPREDNISolone sodium succinate Injectable 40 milliGRAM(s) IV Push daily  metoprolol tartrate 12.5 milliGRAM(s) Oral two times a day  multivitamin 1 Tablet(s) Oral daily  tamsulosin 0.4 milliGRAM(s) Oral at bedtime  tiotropium 18 MICROgram(s) Capsule 1 Capsule(s) Inhalation daily    MEDICATIONS  (PRN):  dextrose 40% Gel 15 Gram(s) Oral once PRN Blood Glucose LESS THAN 70 milliGRAM(s)/deciliter  glucagon  Injectable 1 milliGRAM(s) IntraMuscular once PRN Glucose LESS THAN 70 milligrams/deciliter      Allergies    No Known Allergies    Intolerances    shellfish (Nausea)          Vital Signs Last 24 Hrs  T(C): 36.3 (25 Feb 2019 11:56), Max: 36.9 (24 Feb 2019 19:49)  T(F): 97.4 (25 Feb 2019 11:56), Max: 98.4 (24 Feb 2019 19:49)  HR: 82 (25 Feb 2019 11:57) (69 - 99)  BP: 117/73 (25 Feb 2019 11:56) (103/65 - 119/76)  BP(mean): --  RR: 18 (25 Feb 2019 11:56) (17 - 18)  SpO2: 98% (25 Feb 2019 11:56) (97% - 100%)    I&O's Summary    24 Feb 2019 07:01  -  25 Feb 2019 07:00  --------------------------------------------------------  IN: 0 mL / OUT: 300 mL / NET: -300 mL    25 Feb 2019 07:01  -  25 Feb 2019 12:40  --------------------------------------------------------  IN: 0 mL / OUT: 300 mL / NET: -300 mL          PHYSICAL EXAM:  TELE: SR 80s  Constitutional: NAD, awake and alert, well-developed  HEENT: Moist Mucous Membranes, Anicteric  Pulmonary: Non-labored, breath sounds with expiratory wheezing bilaterally  Cardiovascular: Regular, S1 and S2, No murmurs, rubs, gallops or clicks  Gastrointestinal: Bowel Sounds present, soft, nontender.   Lymph: Mild bilateral peripheral edema. No lymphadenopathy.  Skin: No visible rashes or ulcers.  Psych:  Mood & affect appropriate    LABS: All Labs Reviewed:                        11.5 9.25  )-----------( 242      ( 25 Feb 2019 07:09 )             37.3                         11.6   10.17 )-----------( 272      ( 24 Feb 2019 05:51 )             37.3                         13.8   10.46 )-----------( 346      ( 23 Feb 2019 11:19 )             45.8     25 Feb 2019 07:09    143    |  102    |  14     ----------------------------<  185    4.0     |  35     |  1.10   24 Feb 2019 05:51    142    |  104    |  17     ----------------------------<  266    4.5     |  32     |  1.10   23 Feb 2019 11:19    140    |  102    |  13     ----------------------------<  218    5.0     |  31     |  1.30     Ca    9.4        25 Feb 2019 07:09  Ca    9.3        24 Feb 2019 05:51  Ca    9.5        23 Feb 2019 11:19  Phos  2.4       24 Feb 2019 05:51    TPro  5.9    /  Alb  3.0    /  TBili  0.3    /  DBili  x      /  AST  14     /  ALT  39     /  AlkPhos  90     24 Feb 2019 05:51  TPro  7.5    /  Alb  3.8    /  TBili  0.3    /  DBili  x      /  AST  28     /  ALT  51     /  AlkPhos  135    23 Feb 2019 11:19    PT/INR - ( 24 Feb 2019 05:51 )   PT: 12.4 sec;   INR: 1.09 ratio        < from: 12 Lead ECG (02.23.19 @ 11:15) >  Ventricular Rate 130 BPM    Atrial Rate 130 BPM    P-R Interval 92 ms    QRS Duration 90 ms    Q-T Interval 326 ms    QTC Calculation(Bezet) 479 ms    P Axis 82 degrees    R Axis 80 degrees    T Axis 31 degrees    Diagnosis Line Sinus tachycardia with short IA  Otherwise normal ECG  When compared with ECG of 10-FEB-2019 22:27,  ST no longer depressed in Lateral leads  Confirmed by Jonas Capps MD ([id number]) on 2/23/2019 2:18:07 PM    < end of copied text >     < from: TTE Echo Doppler w/o Cont (10.26.18 @ 19:52) >   EXAM:  ECHO TTE W/O CON COMP W/DOPPLR         PROCEDURE DATE:  10/26/2018        INTERPRETATION:  Ordering Physician: RICKEY TRUJILLO    Indication: Cardiac arrest    Technician: BALDOMERO    Study Quality: Poor given that the patient was supine in the ICU   A complete echocardiographic study was performed utilizing standard   protocol including spectral and color Doppler in all echocardiographic   windows.    Height: 180 cm  Weight: 99 kg  Blood Pressure: 54/74    MEASUREMENTS  IVS: 0.99 cm  PWT: 0.78cm  LA: 3.4cm  AO: 2.9cm  LVIDd: Unable to measured given poor endocardial border definition  LVIDs: Unable to measure given poor endocardial border definition      LVEF: Visually estimated in the LV short axis view (best images of the   LV) is 55-60 %  RVSP: Unable to assess given lack of adequate TR waveform  RA Pressure: 15 mmHg  IVC: Dilated at 2.4 cm in diameter and collapses less than 50% with   inspiration (not valid if patient is intubated and on mechanical   ventilatory support)    FINDINGS  Left Ventricle: Over the left ventricle is poorly visualized. There was   best visualized in the LV short axis view. The visual estimation of the   ejection fraction is 55-60%. I'm unable to rule out regional wall motion   abnormalities given the poor acoustic windows.  Right Ventricle: Overall poorly visualized  Left Atrium: Grossly normal in size  Right Atrium: Overall poorly visualized  Mitral Valve: Normal in structure with trace mitral valve regurgitation  Aortic Valve: Trileaflet and sclerotic with no evidence of significant   insufficiency. No stenosis  Tricuspid Valve: Overall poorly visualized. There was trace tricuspid   valve regurgitation  Pulmonic Valve: Poorly visualized and poor Doppler interrogation  Diastolic Function: The LV diastolic function is indeterminate in this   study  Pericardium/Pleura: No significant pericardial or pleural effusions noted      CONCLUSIONS:  1. Normal technically limited study.  2. The left ventricle was best visualized in the shortaxis view. The LV   systolic function in that view appears preserved with a visually   estimated ejection fraction of 55-60%.  3. I'm unable to rule out regional wall motion abnormalities given the   poor endomyocardial border definition.  4. Normal mitral valve with trace regurgitation.  5. Sclerotic trileaflet aortic valve with no insufficiency or stenosis.  6. Poorly visualized right-sided chambers.  7. Limited study to assess pulmonary artery systolic pressure.                  MAC TAYLOR   This document has been electronically signed. Oct 27 2018 11:31AM    < end of copied text >    < from: Xray Chest 1 View AP/PA (02.23.19 @ 13:01) >    EXAM:  XR CHEST AP OR PA 1V                            PROCEDURE DATE:  02/23/2019          INTERPRETATION:  Chest 1 view    HISTORY: Weakness and shortness of breath    Comparison: 2/10/2019    Radiographic examination shows the heart to be normalin size. The lungs   are hyperinflated but clear. There is no evidence of focal infiltrate,   pneumothorax or pleural effusion. Sternal wires are again noted.    IMPRESSION: Hyperinflated lungs.    Thank you for this referral.                KAREN SHARMA M.D., ATTENDING RADIOLOGIST  This document has been electronically signed. Feb 23 2019  1:08PM    < end of copied text >

## 2019-02-25 NOTE — DISCHARGE NOTE ADULT - SECONDARY DIAGNOSIS.
Hypertension Diabetes Mellitus, Type II CAD (Coronary Artery Disease) Dyslipidemia BPH (benign prostatic hyperplasia)

## 2019-02-25 NOTE — PROGRESS NOTE ADULT - SUBJECTIVE AND OBJECTIVE BOX
Patient is a 78y old  Male who presents with a chief complaint of SOB (25 Feb 2019 06:46)      OVERNIGHT EVENTS/INTERVAL HPI:Pt seen and evaluated at bedside, resting comfortably and eating breakfast. No acute events overnight, no acute complaints at this time. Patient seen on NC, no bipap at this time. Patient states he feels better than on admission and states he'd feel well enough to go home tomorrow.     REVIEW OF SYSTEMS:  CONSTITUTIONAL: No weakness, fevers or chills  EYES/ENT: No visual changes, no throat pain   RESPIRATORY: No cough, wheezing, hemoptysis; No shortness of breath  CARDIOVASCULAR: No chest pain or palpitations  GASTROINTESTINAL: No abdominal pain, nausea, vomiting, or hematemesis; No diarrhea or constipation. No melena or hematochezia.  GENITOURINARY: No dysuria, frequency or hematuria  NEUROLOGICAL: No dizziness, numbness, or weakness  All other review of systems is negative unless indicated above.    VITAL SIGNS:  Vital Signs  T(C): 36.6 (25 Feb 2019 07:51), Max: 36.9 (24 Feb 2019 19:49)  T(F): 97.9 (25 Feb 2019 07:51), Max: 98.4 (24 Feb 2019 19:49)  HR: 89 (25 Feb 2019 07:51) (69 - 99)  BP: 114/70 (25 Feb 2019 07:51) (103/65 - 119/76)  RR: 18 (25 Feb 2019 07:51) (17 - 18)  SpO2: 100% (25 Feb 2019 07:51) (97% - 100%)    PHYSICAL EXAM:   GENERAL: no acute distress  HEENT: NC/AT, EOMI, neck supple, MMM  RESPIRATORY: b/l wheezes on inspiration, prolonged expiration   CARDIOVASCULAR: RRR, no murmurs, gallops, rubs  ABDOMINAL: soft, non-tender, non-distended, positive bowel sounds   EXTREMITIES: no clubbing, cyanosis, or edema  NEUROLOGICAL: alert and oriented x 3, non-focal  SKIN: no rashes or lesions   MUSCULOSKELETAL: no gross joint deformity                          11.5   9.25  )-----------( 242      ( 25 Feb 2019 07:09 )             37.3     02-25    143  |  102  |  14  ----------------------------<  185<H>  4.0   |  35<H>  |  1.10    Ca    9.4      25 Feb 2019 07:09  Phos  2.4     02-24    TPro  5.9<L>  /  Alb  3.0<L>  /  TBili  0.3  /  DBili  x   /  AST  14<L>  /  ALT  39  /  AlkPhos  90  02-24      CAPILLARY BLOOD GLUCOSE      POCT Blood Glucose.: 203 mg/dL (25 Feb 2019 08:02)  POCT Blood Glucose.: 197 mg/dL (24 Feb 2019 21:27)  POCT Blood Glucose.: 300 mg/dL (24 Feb 2019 17:18)  POCT Blood Glucose.: 318 mg/dL (24 Feb 2019 11:45)      MEDICATIONS  (STANDING):  ALBUTerol    0.083% 2.5 milliGRAM(s) Nebulizer every 4 hours  aspirin enteric coated 81 milliGRAM(s) Oral daily  atorvastatin 40 milliGRAM(s) Oral at bedtime  azithromycin  IVPB      azithromycin  IVPB 500 milliGRAM(s) IV Intermittent every 24 hours  buDESOnide 160 MICROgram(s)/formoterol 4.5 MICROgram(s) Inhaler 2 Puff(s) Inhalation two times a day  clopidogrel Tablet 75 milliGRAM(s) Oral daily  dextrose 5%. 1000 milliLiter(s) (50 mL/Hr) IV Continuous <Continuous>  dextrose 50% Injectable 12.5 Gram(s) IV Push once  dextrose 50% Injectable 25 Gram(s) IV Push once  dextrose 50% Injectable 25 Gram(s) IV Push once  enoxaparin Injectable 40 milliGRAM(s) SubCutaneous daily  insulin glargine Injectable (LANTUS) 5 Unit(s) SubCutaneous at bedtime  insulin lispro (HumaLOG) corrective regimen sliding scale   SubCutaneous three times a day before meals  insulin lispro (HumaLOG) corrective regimen sliding scale   SubCutaneous at bedtime  losartan 50 milliGRAM(s) Oral daily  methylPREDNISolone sodium succinate Injectable 40 milliGRAM(s) IV Push daily  metoprolol tartrate 12.5 milliGRAM(s) Oral two times a day  multivitamin 1 Tablet(s) Oral daily  tamsulosin 0.4 milliGRAM(s) Oral at bedtime  tiotropium 18 MICROgram(s) Capsule 1 Capsule(s) Inhalation daily      RADIOLOGY Patient is a 78y old  Male who presents with a chief complaint of SOB (25 Feb 2019 06:46)      OVERNIGHT EVENTS/INTERVAL HPI:Pt seen and evaluated at bedside, resting comfortably and eating breakfast. No acute events overnight, no acute complaints at this time. Patient seen on 3L NC, no BiPAP at this time. Titrated down to home 2L NC. Patient states he feels better than on admission and is looking forward to discharge,     REVIEW OF SYSTEMS:  CONSTITUTIONAL: No weakness, fevers or chills  EYES/ENT: No visual changes, no throat pain   RESPIRATORY: No cough, wheezing, hemoptysis; No shortness of breath  CARDIOVASCULAR: No chest pain or palpitations  GASTROINTESTINAL: No abdominal pain, nausea, vomiting, or hematemesis; No diarrhea or constipation. No melena or hematochezia.  GENITOURINARY: No dysuria, frequency or hematuria  NEUROLOGICAL: No dizziness, numbness, or weakness  All other review of systems is negative unless indicated above.    VITAL SIGNS:  T(C): 36.6 (25 Feb 2019 07:51), Max: 36.9 (24 Feb 2019 19:49)  T(F): 97.9 (25 Feb 2019 07:51), Max: 98.4 (24 Feb 2019 19:49)  HR: 89 (25 Feb 2019 07:51) (69 - 99)  BP: 114/70 (25 Feb 2019 07:51) (103/65 - 119/76)  RR: 18 (25 Feb 2019 07:51) (17 - 18)  SpO2: 100% (25 Feb 2019 07:51) (97% - 100%)    PHYSICAL EXAM:   GENERAL: no acute distress  HEENT: NC/AT, EOMI, neck supple, MMM  RESPIRATORY: b/l wheezes on inspiration, prolonged expiration   CARDIOVASCULAR: RRR, no murmurs, gallops, rubs  ABDOMINAL: soft, non-tender, non-distended, positive bowel sounds   EXTREMITIES: no clubbing, cyanosis, or edema  NEUROLOGICAL: alert and oriented x 3, non-focal  SKIN: no rashes or lesions   MUSCULOSKELETAL: no gross joint deformity                          11.5   9.25  )-----------( 242      ( 25 Feb 2019 07:09 )             37.3     02-25    143  |  102  |  14  ----------------------------<  185<H>  4.0   |  35<H>  |  1.10    Ca    9.4      25 Feb 2019 07:09  Phos  2.4     02-24    TPro  5.9<L>  /  Alb  3.0<L>  /  TBili  0.3  /  DBili  x   /  AST  14<L>  /  ALT  39  /  AlkPhos  90  02-24      CAPILLARY BLOOD GLUCOSE  POCT Blood Glucose.: 203 mg/dL (25 Feb 2019 08:02)  POCT Blood Glucose.: 197 mg/dL (24 Feb 2019 21:27)  POCT Blood Glucose.: 300 mg/dL (24 Feb 2019 17:18)  POCT Blood Glucose.: 318 mg/dL (24 Feb 2019 11:45)      MEDICATIONS  (STANDING):  ALBUTerol    0.083% 2.5 milliGRAM(s) Nebulizer every 4 hours  aspirin enteric coated 81 milliGRAM(s) Oral daily  atorvastatin 40 milliGRAM(s) Oral at bedtime  azithromycin  IVPB      azithromycin  IVPB 500 milliGRAM(s) IV Intermittent every 24 hours  buDESOnide 160 MICROgram(s)/formoterol 4.5 MICROgram(s) Inhaler 2 Puff(s) Inhalation two times a day  clopidogrel Tablet 75 milliGRAM(s) Oral daily  dextrose 5%. 1000 milliLiter(s) (50 mL/Hr) IV Continuous <Continuous>  dextrose 50% Injectable 12.5 Gram(s) IV Push once  dextrose 50% Injectable 25 Gram(s) IV Push once  dextrose 50% Injectable 25 Gram(s) IV Push once  enoxaparin Injectable 40 milliGRAM(s) SubCutaneous daily  insulin glargine Injectable (LANTUS) 5 Unit(s) SubCutaneous at bedtime  insulin lispro (HumaLOG) corrective regimen sliding scale   SubCutaneous three times a day before meals  insulin lispro (HumaLOG) corrective regimen sliding scale   SubCutaneous at bedtime  losartan 50 milliGRAM(s) Oral daily  methylPREDNISolone sodium succinate Injectable 40 milliGRAM(s) IV Push daily  metoprolol tartrate 12.5 milliGRAM(s) Oral two times a day  multivitamin 1 Tablet(s) Oral daily  tamsulosin 0.4 milliGRAM(s) Oral at bedtime  tiotropium 18 MICROgram(s) Capsule 1 Capsule(s) Inhalation daily

## 2019-02-25 NOTE — PROGRESS NOTE ADULT - PROBLEM SELECTOR PLAN 3
-Tachycardia likely caused by COPD exacerbation   -Patient did not seem to be in afib more likely SVT vs artifact on strip, will continue to monitor patient on tele for arrhythmia  -trops negative x1, ProBNP 175  -Keep Mg> 2, K> 4  -Cardio, Dr. Capps, recs appreciated
-Tachycardia likely caused by COPD exacerbation   -Patient did not seem to be in afib more likely SVT vs artifact on strip, will continue to monitor patient on tele for arrhythmia  -trops negative x2, ProBNP 175  -Keep Mg> 2, K> 4  -Cardio, Dr. Capps, following

## 2019-02-25 NOTE — DISCHARGE NOTE ADULT - CARE PROVIDER_API CALL
[place] [address]  Phone: [phone]  Fax: [phone]  Follow Up Time: 1 week    Pallavi Rod  Phone: [phone]  Fax: (   )    -  Follow Up Time:     Arun Dejesus)  Critical Care Medicine; Internal Medicine; Pulmonary Disease  [address] [place] [address]  Phone: [phone]  Fax: [phone]  Follow Up Time:

## 2019-02-25 NOTE — DISCHARGE NOTE ADULT - PROVIDER TOKENS
PROVIDER:[TOKEN:[[id number]],FOLLOWUP:[1 week]],FREE:[LAST:[Lyamelia],FIRST:[Pallavi],PHONE:[phone]],FAX:[(   [phone]    -]],PROVIDER:[TOKEN:[[id number]]]

## 2019-02-25 NOTE — DISCHARGE NOTE ADULT - PATIENT PORTAL LINK FT
You can access the [facility] Patient Portal, offered by [facility], by registering with the following website: [email or web address]

## 2019-02-25 NOTE — PROGRESS NOTE ADULT - ASSESSMENT
79 yo M with Hx asthma, COPD with frequent admissions to this and other hospitals, chronic hypoxemic respiratory failure, CAD with 3v CABG 2004, without any other more recent cardiac issue.  His wife reports a normal stress test in 8/2018.  He has peripheral vascular disease status post stents years ago, which have been found patent.  He was here in October with resp failure, complicated by a bradycardic/PEA arrest. Echo during that hospitalization revealed a normal lvef without significant valve disease. He was admitted again on several other recent occasions with dyspnea requiring admission.    He presents along with his wife for dyspnea, refractory to the use of nebs and bipap at home.  He was most recently seen here 2/12/19 for acute on chronic hypoxic/hypercarbic respiratory failure due to COPD exacerbation. He was treated with Azithromycin, Solumedrol, inhalers, and nebulizer treatments.    His course has been notable for severe acute resp acidosis.  He remains with severe respiratory insufficiency and remains on bipap.  He has been unable to come off bipap since admission.    -there is no evidence of acute ischemia.  -he does have a hx of cad s/p remote cabg  -cont dapt, cont statin  -cont bb as tolerated    -he is less tachycardic, reactive to the severity of his acute illness  -he has had pea arrest related to severe acute hypercarbic resp failure in the past, and is at risk of recurrence  -monitor carefully    -there is no evidence for meaningful  volume overload.  -normal ef 10/2018, no need to repeat at this time  -no pasp was estimated at that time, but repetition of the echo is low yield    -DVT prophylaxis  -Monitor and replete lytes, keep K>4 and Mg >2  - Further cardiac workup will depend on clinical course.      	  Cris Galarza, ANP-C; AGACNP-BC  Cardiology 77 yo M with Hx asthma, COPD with frequent admissions to this and other hospitals, chronic hypoxemic respiratory failure, CAD with 3v CABG 2004, without any other more recent cardiac issue.  His wife reports a normal stress test in 8/2018.  He has peripheral vascular disease status post stents years ago, which have been found patent.  He was here in October with resp failure, complicated by a bradycardic/PEA arrest. Echo during that hospitalization revealed a normal lvef without significant valve disease. He was admitted again on several other recent occasions with dyspnea requiring admission.    He presents along with his wife for dyspnea, refractory to the use of nebs and bipap at home.  He was most recently seen here 2/12/19 for acute on chronic hypoxic/hypercarbic respiratory failure due to COPD exacerbation. He was treated with Azithromycin, Solumedrol, inhalers, and nebulizer treatments.    His course has been notable for severe acute resp acidosis.  He remains with severe respiratory insufficiency and remains on bipap.  He has been unable to come off bipap since admission. His breathing has now improved.    -there is no evidence of acute ischemia.  -he does have a hx of cad s/p remote cabg  -cont dapt, cont statin  -cont bb as tolerated    -he is less tachycardic, reactive to the severity of his acute illness  -he has had pea arrest related to severe acute hypercarbic resp failure in the past, and is at risk of recurrence  -monitor carefully    -there is no evidence for meaningful  volume overload.  -normal ef 10/2018, no need to repeat at this time  -no pasp was estimated at that time, but repetition of the echo is low yield    -DVT prophylaxis  -Monitor and replete lytes, keep K>4 and Mg >2  - Further cardiac workup will depend on clinical course.        Cris Galarza, ANP-C; AGACNP-BC  Cardiology

## 2019-02-25 NOTE — PROGRESS NOTE ADULT - PROBLEM SELECTOR PLAN 5
-DMII, on home Metformin and glipizide. Hold oral home meds  -POCT Glucose running ~300's yesterday  -start Low dose ISS, lantus  -Hypoglycemia protocol -DMII, on home Metformin and glipizide. Hold oral home meds  -POCT Glucose running ~300's yesterday  -continue low dose ISS, lantus 5 QHS  -Hypoglycemia protocol, POCT glucose monitoring

## 2019-02-25 NOTE — DISCHARGE NOTE ADULT - MEDICATION SUMMARY - MEDICATIONS TO TAKE
I will START or STAY ON the medications listed below when I get home from the hospital:    predniSONE 10 mg oral tablet  -- Take 3 tabs (30 mg) on 2/27  Take 2 tabs (20 mg) on 2/28  Take 2 tabs (20 mg) on 3/1  Take 1 tab (10 mg) on 3/2  Take 1 tab (10 mg) on 3/3  Then STOP.  -- Indication: For COPD exacerbation    aspirin 81 mg oral delayed release tablet  -- 1 tab(s) by mouth once a day  -- Indication: For CAD (Coronary Artery Disease)    valsartan 80 mg oral tablet  -- 1 tab(s) by mouth once a day  -- Indication: For Hypertension    tamsulosin 0.4 mg oral capsule  -- 1 cap(s) by mouth once a day (at bedtime)  -- Indication: For BPH (benign prostatic hyperplasia)    metFORMIN 1000 mg oral tablet  -- 1 tab(s) by mouth 2 times a day  -- Indication: For Diabetes Mellitus, Type II    glipiZIDE 2.5 mg oral tablet, extended release  -- 1 tab(s) by mouth 2 times a day  -- Indication: For Diabetes Mellitus, Type II    atorvastatin 40 mg oral tablet  -- 1 tab(s) by mouth once a day  -- Indication: For Dyslipidemia    clopidogrel 75 mg oral tablet  -- 1 tab(s) by mouth once a day  -- Indication: For CAD (Coronary Artery Disease)    metoprolol tartrate 25 mg oral tablet  -- 0.5 tab(s) by mouth 2 times a day  -- Indication: For Hypertension    azithromycin 500 mg oral tablet  -- 1 tab(s) by mouth every 24 hours for 4 days  -- Indication: For Pneumonia    Acidophilus oral capsule  -- 1 cap(s) by mouth 3 times a day (with meals) while taking Azithromycin   -- Indication: For Probiotic    Daliresp 500 mcg oral tablet  -- 1 tab(s) by mouth once a day  -- Indication: For COPD    Multiple Vitamins oral tablet  -- 1 tab(s) by mouth once a day  -- Indication: For Multivitamin

## 2019-02-25 NOTE — PHARMACOTHERAPY INTERVENTION NOTE - COMMENTS
Patient on day 3 of IV azithromycin. Spoke with physician and recommended changing to PO, as patient is able to tolerate PO meds.

## 2019-02-25 NOTE — DISCHARGE NOTE ADULT - HOSPITAL COURSE
79 yo male with PMHx of HTN, DM2 (on home Metformin and glipizide), COPD (2L home/ BIPAP at night), CAD with 3v CABG 2004, HLD, hx hypercapnic resp failure with recent intubation c/b with cardiac arrest (12/2018), presenting to ED with SOB since yesterday.  Patient states that on the night prior to admission, he was lying in bed when all of a sudden had labored breathing, he got up to use the bathroom, and the SOB got worse.  Patient felt palpitations at this time but no chest pain/pressure.  Patient had BM and states that it helped with SOB, however still present and so patient used home nebulizers and put on BiPAP machine, which again helped a little but did not cause symptoms to go away.  Patient got minimal sleep that night, constantly having SOB and this morning as per wife, patient was not able to get up had significant weakness and so she called 911.  Patient states SOB still present but currently on BiPAP machine, is feeling a little better.  Denies dizziness, lightheadedness, fevers, chills, nausea, chest pain, abdominal pain.  Patient does have chronic cough related to COPD however nonproductive.  Patient saw cardio after last admission, no acute changes.  Metformin stopped on last admission due to lactic acidosis, however followed up with PCP and restarted.  Patient finished abx course from last admission of Zithromax and completed prednisone course on Sunday.    Of note, patient was admitted to [facility] on 2/12/19 for acute on chronic hypoxic/hypercarbic respiratory failure due to COPD exacerbation. He was treated with Azithromycin, Solumedrol, inhalers, and nebulizer treatments.    In the ED, , /78, RR 18, SpO2 99% on 2L NC.  Labs showed CBC wnl, PT/INR wnl, lactate 1.0, BMP potassium 5.0, glucose 218, alk phos 135, ABG showed pCO2 76, pH 7.22, HCO3 25.  EKG: tachycardia , NSR  CXR: Hyperinflated lungs  Patient received 1L NS bolus, albuterol x1, duoneb x1, solumedrol 125mg IV x1.  Blood cultures and Flu/RSV sent from ED.  LE Dopper negative. ABG improving.     2/25: pt says ready to go home by tomorrow. seen on NC at this time 79 yo male with PMHx of HTN, DM2 (on home Metformin and glipizide), COPD (2L home/ BIPAP at night), CAD with 3v CABG 2004, HLD, hx hypercapnic resp failure with recent intubation c/b with cardiac arrest (12/2018), presenting to ED with SOB x 1 day.  Patient states that on the night prior to admission, he was lying in bed when all of a sudden had labored breathing, he got up to use the bathroom, and the SOB got worse.  Patient felt palpitations at that time but no chest pain/pressure.  Patient had BM which helped with SOB, however was still present and so patient used home nebulizers and put on BiPAP machine, which again helped a little but did not cause symptoms to go away.  Patient got minimal sleep that night, constantly having SOB and on morning of admission, patient was not able to get up/had significant weakness and so wife called 911. Denied dizziness, lightheadedness, fevers, chills, nausea, chest pain, abdominal pain in ED. Patient does have chronic cough related to COPD however nonproductive.  Patient saw cardio after last admission, no acute changes.  Metformin stopped on last admission due to lactic acidosis, however followed up with PCP and restarted. Patient finished antibiotic course from last admission of Zithromax and completed prednisone course on Sunday.    Of note, patient was admitted to [facility] on 2/12/19 for acute on chronic hypoxic/hypercarbic respiratory failure due to COPD exacerbation. He was treated with Azithromycin, Solumedrol, inhalers, and nebulizer treatments.    Patient's admission ABG showed pCO2 76, pH 7.22, HCO3 25. EKG: tachycardia , NSR. CXR unremarkable. Patient received albuterol, duoneb, solumedrol 125mg IV x1.  Blood cultures NGTD and flu/RSV negative. LE Doppler negative. ABG improved during hospital stay. Patient was seen by pulmonologist Dr. Dejesus and continued on IV solu-medrol. Patient continued on nasal cannula and BiPAP at night with improvement of his symptoms. NC titrated down to 2L (patient's home oxygen). Patient transitioned to oral prednisone on day of discharge with taper over 6 days.     Vital Signs  T(C): 36.7 (26 Feb 2019 07:41), Max: 36.8 (25 Feb 2019 16:27)  T(F): 98 (26 Feb 2019 07:41), Max: 98.2 (25 Feb 2019 16:27)  HR: 90 (26 Feb 2019 07:43) (72 - 98)  BP: 113/69 (26 Feb 2019 07:41) (97/62 - 121/70)  RR: 18 (26 Feb 2019 07:41) (16 - 18)  SpO2: 99% (26 Feb 2019 07:43) (96% - 100%)    Physical Exam:  General: well-developed, well-nourished, NAD  HEENT: normocephalic, atraumatic, EOMI, moist mucous membranes   Neck: supple, non-tender, no masses  Neurology: awake and alert, sensation intact  Respiratory: clear to auscultation bilaterally; no wheezes, rhonchi, or rales  CV: regular rate and rhythm, soft S1/S2, no murmurs, rubs, or gallops  Abdominal: soft, non-tender, non-distended, bowel sounds present  Extremities: no clubbing, cyanosis, or edema; palpable peripheral pulses  Musculoskeletal: no joint erythema or warmth, no joint swelling   Skin: warm, dry, normal color

## 2019-02-25 NOTE — DISCHARGE NOTE ADULT - PLAN OF CARE
Resolution of symptoms You presented to the hospital with acute hypercapnic respiratory failure secondary to an exacerbation of your COPD. Your flu and viral panel was negative. Your blood cultures showed no growth to day of discharge. You were seen by pulmonologist Dr. Dejesus and given BiPAP, nasal cannula oxygen, antibiotics, and IV steroids. Continue to use BiPAP at home and follow up with [facility] if you have any issues with your machine or mask. You were transitioned to oral steroids on day of discharge. Continue the steroid taper by taking 30 mg on 2/27, 20 mg 2/28, 20 mg 3/1, 10 mg 3/2, 10 mg 3/3 then STOP. Continue your home Daliresp. On home Valsartan 80mg, therapeutic interchange to Losartan 50mg daily  -On home Metoprolol tartrate 12.5mg BID, will continue with hold parameters. You presented to the hospital with acute hypercapnic respiratory failure secondary to an exacerbation of your COPD and possible pneumonia. Your flu and viral panel was negative. Your blood cultures showed no growth to day of discharge. You were seen by pulmonologist Dr. Dejesus and given BiPAP, nasal cannula oxygen, antibiotics, and IV steroids. Continue to use BiPAP at home and follow up with [facility] if you have any issues with your machine or mask. You were transitioned to oral steroids on day of discharge. Continue the steroid taper by taking 30 mg on 2/27, 20 mg 2/28, 20 mg 3/1, 10 mg 3/2, 10 mg 3/3 then STOP. Continue Azithromycin 500 mg daily for 3 days. Continue your home Daliresp. Follow up outpatient with your PMD Dr. Avila and pulm Dr. Dejesus within 1 week. Continue your home Valsartan 80mg daily and metoprolol tartrate 12.5mg twice a day. Follow up with your PMD Dr. Avila and cardiologist Dr. Rod. Continue your home glipizide and metformin. Follow up with your PMD. Continue your home aspirin, statin, plavix. Follow up with your PMD. Continue your home statin. Follow up with your PMD. Continue your home flomax. Follow up with your PMD.

## 2019-02-25 NOTE — PROGRESS NOTE ADULT - ASSESSMENT
77 yo male with PMHx of HTN, DM2 (on home Metformin and glipizide), COPD (2L home/ BIPAP at night), CAD with 3v CABG 2004, HLD, hx hypercapnic resp failure with recent intubation c/b with cardiac arrest (12/2018), presenting to ED with SOB since yesterday admitted for COPD exacerbation, now improved.

## 2019-02-25 NOTE — DISCHARGE NOTE ADULT - CARE PLAN
Principal Discharge DX:	COPD exacerbation Principal Discharge DX:	COPD exacerbation  Goal:	Resolution of symptoms  Assessment and plan of treatment:	You presented to the hospital with acute hypercapnic respiratory failure secondary to an exacerbation of your COPD. Your flu and viral panel was negative. Your blood cultures showed no growth to day of discharge. You were seen by pulmonologist Dr. Dejesus and given BiPAP, nasal cannula oxygen, antibiotics, and IV steroids. Continue to use BiPAP at home and follow up with [facility] if you have any issues with your machine or mask. You were transitioned to oral steroids on day of discharge. Continue the steroid taper by taking 30 mg on 2/27, 20 mg 2/28, 20 mg 3/1, 10 mg 3/2, 10 mg 3/3 then STOP. Continue your home Daliresp.  Secondary Diagnosis:	Hypertension  Assessment and plan of treatment:	On home Valsartan 80mg, therapeutic interchange to Losartan 50mg daily  -On home Metoprolol tartrate 12.5mg BID, will continue with hold parameters.  Secondary Diagnosis:	Diabetes Mellitus, Type II  Secondary Diagnosis:	CAD (Coronary Artery Disease)  Secondary Diagnosis:	Dyslipidemia  Secondary Diagnosis:	BPH (benign prostatic hyperplasia) Principal Discharge DX:	COPD exacerbation  Goal:	Resolution of symptoms  Assessment and plan of treatment:	You presented to the hospital with acute hypercapnic respiratory failure secondary to an exacerbation of your COPD and possible pneumonia. Your flu and viral panel was negative. Your blood cultures showed no growth to day of discharge. You were seen by pulmonologist Dr. Dejesus and given BiPAP, nasal cannula oxygen, antibiotics, and IV steroids. Continue to use BiPAP at home and follow up with [facility] if you have any issues with your machine or mask. You were transitioned to oral steroids on day of discharge. Continue the steroid taper by taking 30 mg on 2/27, 20 mg 2/28, 20 mg 3/1, 10 mg 3/2, 10 mg 3/3 then STOP. Continue Azithromycin 500 mg daily for 3 days. Continue your home Daliresp. Follow up outpatient with your PMD Dr. Avila and pulm Dr. Dejesus within 1 week.  Secondary Diagnosis:	Hypertension  Assessment and plan of treatment:	Continue your home Valsartan 80mg daily and metoprolol tartrate 12.5mg twice a day. Follow up with your PMD Dr. Avila and cardiologist Dr. Rod.  Secondary Diagnosis:	Diabetes Mellitus, Type II  Assessment and plan of treatment:	Continue your home glipizide and metformin. Follow up with your PMD.  Secondary Diagnosis:	CAD (Coronary Artery Disease)  Assessment and plan of treatment:	Continue your home aspirin, statin, plavix. Follow up with your PMD.  Secondary Diagnosis:	Dyslipidemia  Assessment and plan of treatment:	Continue your home statin. Follow up with your PMD.  Secondary Diagnosis:	BPH (benign prostatic hyperplasia)  Assessment and plan of treatment:	Continue your home flomax. Follow up with your PMD.

## 2019-02-25 NOTE — PROGRESS NOTE ADULT - PROBLEM SELECTOR PLAN 1
-Likely secondary to COPD exacerbation   -Monitor ABG closely - improving since admission.  -Flu/RSV panel negative, RVP negative  -f/u BCx; prelim no growth  -Continue bipap qhs and Nasal cannula.   -Patients wife unsure if home Bipap working well, called [facility] to check bipap, rep will be visiting their home today per patient.   -Continue IV solumedrol 40mg QD per pulm  -Continue with duonebs q6hrs  -Pulm, Dr. Dejesus following  -DC on Prednisone taper 30mg over 6 days -Likely secondary to COPD exacerbation   -Monitor ABG closely - improving since admission.  -Flu/RSV panel negative, RVP negative  -f/u BCx; prelim no growth  -Continue bipap qhs and nasal cannula; on 2L NC.   -Patients wife unsure if home Bipap working well, called [facility] to check bipap, rep will be visiting their home today per patient.   -Continue azithroymcin, f/u pulm recs for discharge  -Continue IV solumedrol 40mg QD; discharge on PO prednisone, taper 30 mg over 6 days  -Continue with duonebs q6hrs, inhalers  -Pulm, Dr. Dejesus following  -DC on Prednisone taper 30mg over 6 days

## 2019-02-26 VITALS — OXYGEN SATURATION: 98 %

## 2019-02-26 LAB
ANION GAP SERPL CALC-SCNC: 5 MMOL/L — SIGNIFICANT CHANGE UP (ref 5–17)
BUN SERPL-MCNC: 18 MG/DL — SIGNIFICANT CHANGE UP (ref 7–23)
CALCIUM SERPL-MCNC: 9.2 MG/DL — SIGNIFICANT CHANGE UP (ref 8.5–10.1)
CHLORIDE SERPL-SCNC: 102 MMOL/L — SIGNIFICANT CHANGE UP (ref 96–108)
CO2 SERPL-SCNC: 36 MMOL/L — HIGH (ref 22–31)
CREAT SERPL-MCNC: 1 MG/DL — SIGNIFICANT CHANGE UP (ref 0.5–1.3)
GLUCOSE SERPL-MCNC: 181 MG/DL — HIGH (ref 70–99)
HCT VFR BLD CALC: 38.4 % — LOW (ref 39–50)
HGB BLD-MCNC: 11.9 G/DL — LOW (ref 13–17)
MCHC RBC-ENTMCNC: 27.9 PG — SIGNIFICANT CHANGE UP (ref 27–34)
MCHC RBC-ENTMCNC: 31 GM/DL — LOW (ref 32–36)
MCV RBC AUTO: 89.9 FL — SIGNIFICANT CHANGE UP (ref 80–100)
NRBC # BLD: 0 /100 WBCS — SIGNIFICANT CHANGE UP (ref 0–0)
PLATELET # BLD AUTO: 244 K/UL — SIGNIFICANT CHANGE UP (ref 150–400)
POTASSIUM SERPL-MCNC: 3.6 MMOL/L — SIGNIFICANT CHANGE UP (ref 3.5–5.3)
POTASSIUM SERPL-SCNC: 3.6 MMOL/L — SIGNIFICANT CHANGE UP (ref 3.5–5.3)
RBC # BLD: 4.27 M/UL — SIGNIFICANT CHANGE UP (ref 4.2–5.8)
RBC # FLD: 13.2 % — SIGNIFICANT CHANGE UP (ref 10.3–14.5)
SODIUM SERPL-SCNC: 143 MMOL/L — SIGNIFICANT CHANGE UP (ref 135–145)
WBC # BLD: 7.59 K/UL — SIGNIFICANT CHANGE UP (ref 3.8–10.5)
WBC # FLD AUTO: 7.59 K/UL — SIGNIFICANT CHANGE UP (ref 3.8–10.5)

## 2019-02-26 PROCEDURE — 99291 CRITICAL CARE FIRST HOUR: CPT

## 2019-02-26 PROCEDURE — 85610 PROTHROMBIN TIME: CPT

## 2019-02-26 PROCEDURE — 85048 AUTOMATED LEUKOCYTE COUNT: CPT

## 2019-02-26 PROCEDURE — 84484 ASSAY OF TROPONIN QUANT: CPT

## 2019-02-26 PROCEDURE — 82962 GLUCOSE BLOOD TEST: CPT

## 2019-02-26 PROCEDURE — 85027 COMPLETE CBC AUTOMATED: CPT

## 2019-02-26 PROCEDURE — 82785 ASSAY OF IGE: CPT

## 2019-02-26 PROCEDURE — 36600 WITHDRAWAL OF ARTERIAL BLOOD: CPT

## 2019-02-26 PROCEDURE — 99239 HOSP IP/OBS DSCHRG MGMT >30: CPT

## 2019-02-26 PROCEDURE — 93005 ELECTROCARDIOGRAM TRACING: CPT

## 2019-02-26 PROCEDURE — 84145 PROCALCITONIN (PCT): CPT

## 2019-02-26 PROCEDURE — 99231 SBSQ HOSP IP/OBS SF/LOW 25: CPT

## 2019-02-26 PROCEDURE — 36415 COLL VENOUS BLD VENIPUNCTURE: CPT

## 2019-02-26 PROCEDURE — 87040 BLOOD CULTURE FOR BACTERIA: CPT

## 2019-02-26 PROCEDURE — 93970 EXTREMITY STUDY: CPT

## 2019-02-26 PROCEDURE — 87633 RESP VIRUS 12-25 TARGETS: CPT

## 2019-02-26 PROCEDURE — 83605 ASSAY OF LACTIC ACID: CPT

## 2019-02-26 PROCEDURE — 84100 ASSAY OF PHOSPHORUS: CPT

## 2019-02-26 PROCEDURE — 87798 DETECT AGENT NOS DNA AMP: CPT

## 2019-02-26 PROCEDURE — 83880 ASSAY OF NATRIURETIC PEPTIDE: CPT

## 2019-02-26 PROCEDURE — 82803 BLOOD GASES ANY COMBINATION: CPT

## 2019-02-26 PROCEDURE — 94760 N-INVAS EAR/PLS OXIMETRY 1: CPT

## 2019-02-26 PROCEDURE — 87581 M.PNEUMON DNA AMP PROBE: CPT

## 2019-02-26 PROCEDURE — 94640 AIRWAY INHALATION TREATMENT: CPT

## 2019-02-26 PROCEDURE — 87486 CHLMYD PNEUM DNA AMP PROBE: CPT

## 2019-02-26 PROCEDURE — 96374 THER/PROPH/DIAG INJ IV PUSH: CPT

## 2019-02-26 PROCEDURE — 94660 CPAP INITIATION&MGMT: CPT

## 2019-02-26 PROCEDURE — 97161 PT EVAL LOW COMPLEX 20 MIN: CPT

## 2019-02-26 PROCEDURE — 85730 THROMBOPLASTIN TIME PARTIAL: CPT

## 2019-02-26 PROCEDURE — 80053 COMPREHEN METABOLIC PANEL: CPT

## 2019-02-26 PROCEDURE — 82550 ASSAY OF CK (CPK): CPT

## 2019-02-26 PROCEDURE — 80048 BASIC METABOLIC PNL TOTAL CA: CPT

## 2019-02-26 PROCEDURE — 87631 RESP VIRUS 3-5 TARGETS: CPT

## 2019-02-26 PROCEDURE — 71045 X-RAY EXAM CHEST 1 VIEW: CPT

## 2019-02-26 RX ORDER — LACTOBACILLUS ACIDOPHILUS 100MM CELL
1 CAPSULE ORAL
Qty: 12 | Refills: 0 | OUTPATIENT
Start: 2019-02-26 | End: 2019-03-01

## 2019-02-26 RX ORDER — AZITHROMYCIN 500 MG/1
1 TABLET, FILM COATED ORAL
Qty: 4 | Refills: 0 | OUTPATIENT
Start: 2019-02-26 | End: 2019-03-01

## 2019-02-26 RX ORDER — POTASSIUM CHLORIDE 20 MEQ
40 PACKET (EA) ORAL ONCE
Qty: 0 | Refills: 0 | Status: COMPLETED | OUTPATIENT
Start: 2019-02-26 | End: 2019-02-26

## 2019-02-26 RX ADMIN — Medication 30 MILLIGRAM(S): at 12:11

## 2019-02-26 RX ADMIN — ALBUTEROL 2.5 MILLIGRAM(S): 90 AEROSOL, METERED ORAL at 15:34

## 2019-02-26 RX ADMIN — ENOXAPARIN SODIUM 40 MILLIGRAM(S): 100 INJECTION SUBCUTANEOUS at 12:12

## 2019-02-26 RX ADMIN — Medication 40 MILLIEQUIVALENT(S): at 12:11

## 2019-02-26 RX ADMIN — ALBUTEROL 2.5 MILLIGRAM(S): 90 AEROSOL, METERED ORAL at 07:39

## 2019-02-26 RX ADMIN — Medication 1 TABLET(S): at 12:11

## 2019-02-26 RX ADMIN — BUDESONIDE AND FORMOTEROL FUMARATE DIHYDRATE 2 PUFF(S): 160; 4.5 AEROSOL RESPIRATORY (INHALATION) at 07:36

## 2019-02-26 RX ADMIN — TIOTROPIUM BROMIDE 1 CAPSULE(S): 18 CAPSULE ORAL; RESPIRATORY (INHALATION) at 07:37

## 2019-02-26 RX ADMIN — CLOPIDOGREL BISULFATE 75 MILLIGRAM(S): 75 TABLET, FILM COATED ORAL at 12:11

## 2019-02-26 RX ADMIN — ALBUTEROL 2.5 MILLIGRAM(S): 90 AEROSOL, METERED ORAL at 02:17

## 2019-02-26 RX ADMIN — Medication 40 MILLIGRAM(S): at 05:17

## 2019-02-26 RX ADMIN — Medication 2: at 08:17

## 2019-02-26 RX ADMIN — Medication 5: at 12:12

## 2019-02-26 RX ADMIN — ALBUTEROL 2.5 MILLIGRAM(S): 90 AEROSOL, METERED ORAL at 11:17

## 2019-02-26 RX ADMIN — Medication 81 MILLIGRAM(S): at 12:11

## 2019-02-26 NOTE — PROGRESS NOTE ADULT - ASSESSMENT
77 yo M with Hx asthma, COPD with frequent admissions to this and other hospitals, chronic hypoxemic respiratory failure, CAD with 3v CABG 2004, without any other more recent cardiac issue.  His wife reports a normal stress test in 8/2018.  He has peripheral vascular disease status post stents years ago, which have been found patent.  He was here in October with resp failure, complicated by a bradycardic/PEA arrest. Echo during that hospitalization revealed a normal lvef without significant valve disease. He was admitted again on several other recent occasions with dyspnea requiring admission.    He presents along with his wife for dyspnea, refractory to the use of nebs and bipap at home.  He was most recently seen here 2/12/19 for acute on chronic hypoxic/hypercarbic respiratory failure due to COPD exacerbation. He was treated with Azithromycin, Solumedrol, inhalers, and nebulizer treatments.    His course has been notable for severe acute resp acidosis.  He remains with severe respiratory insufficiency and remains on bipap.  He has been unable to come off bipap since admission. His breathing has now improved.    - Stable CAD s/p remote cabg  - Continue DAPT, mACEI, and statin  - Resume low dose BB (home dose) as patient becomes tachycardic up to 130's    - History of PEA arrest related to severe acute hypercarbic resp failure in the past, and is at risk of recurrence  - Continue supplemental O2  - Nocturnal CPAP   - Monitor carefully    - No evidence for meaningful  volume overload.  - Normal ef 10/2018, no need to repeat at this time    - DVT prophylaxis  - Monitor and replete lytes, keep K>4 and Mg >2  - Abx per primary  - Bronchodilators and steroid per Pulm  - Further cardiac workup will depend on clinical course.      Dena Maynard NP  Cardiology

## 2019-02-26 NOTE — PHYSICAL THERAPY INITIAL EVALUATION ADULT - ADDITIONAL COMMENTS
Pt lives with his spouse in a house, + steps to bedroom. Pt ambulates independently without device- uses rollator as needed and is independent with ADLs. + driving. Pt uses home O2 24/7

## 2019-02-26 NOTE — PROGRESS NOTE ADULT - ATTENDING COMMENTS
I saw and examined the patient personally. Spoke with above provider regarding this case. I reviewed the above findings completely.  I agree with the above history, physical, and plan which I have edited where appropriate.
Seen/examined. agree with above.
The patient was personally seen and examined, in addition to being examined and evaluated by NP.  All elements of the note were edited where appropriate.
79 yo male with PMHx of HTN, DM2 (on home Metformin and glipizide), COPD (2L home/ BIPAP at night), CAD with 3v CABG 2004, HLD, hx hypercapnic resp failure with recent intubation c/b with cardiac arrest (12/2018), presenting to ED with SOB since yesterday admitted for COPD exacerbation, now improved. Plan: apprec PT eval, improving cont steroids, dc planning to [place], apprec pulm recs, monitor clinical course

## 2019-02-26 NOTE — PROGRESS NOTE ADULT - SUBJECTIVE AND OBJECTIVE BOX
Patient was examined Chart reviewed Detailed note will be inserted below after going over latest data Meanwhile please refer to my previous notes for Pulmonary/Critical Care assessment recommendations     Home niv changed to epap max 16 epap min 8 ps max 20 fio2 .24   Uses 2 l when hes off niv  Equipment     dc being planned Patient was examined Chart reviewed Detailed note will be inserted below after going over latest data Meanwhile please refer to my previous notes for Pulmonary/Critical Care assessment recommendations     Home niv changed to epap max 16 epap min 8 ps max 20 fio2 .24   Uses 2 l when hes off niv  Equipment     dc being planned          [facility]    ALLERGY Shellfish  CONTACT Sp Amira C    REASON FOR VISIT Subsequent pulm fu   Initial evaluation/Pulmonary consultation requested  2/23/2019 from Dr Dejesus by Dr Hughes    Patient examined chart reviewed  HOSPITAL ADMISSION [facility] Dr Ernie Dasilva 2/23/2019  PATIENT CAME  FROM (if information available)      VITALS/LABS         2/26/2019 afeb 86 103/86   2/26/2019 14/6/.24   2/26/2019 W 7.5 Hb 11.9 Plt 244 Na 143 K 3.6 CO2 36 Cr 1     REVIEW OF SYMPTOMS    Able to give ROS  Yes     RELIABLE No   CONSTITUTIONAL Weakness Yes  Chills No Vision changes No  ENDOCRINE No unexplained hair loss No heat or cold intolerance    ALLERGY No hives  Sore throat No   RESP Coughing blood no  Shortness of breath YES   NEURO No Headache  Confusion Pain neck No   CARDIAC No Chest pain No Palpitations   GI No Pain abdomen NO   Vomiting NO     PHYSICAL EXAM    HEENT Unremarkable PERRLA atraumatic   RESP Fair air entry EXP prolonged    Harsh breath sound Resp distres mild   CARDIAC S1 S2 No S3     NO JVD    ABDOMEN SOFT BS PRESENT NOT DISTENDED No hepatosplenomegaly PEDAL EDEMA present No calf tenderness  NO rash   GENERAL Not TOXIC looking    GLOBAL ISSUE/BEST PRACTICE:      PROBLEM: HOB elevation:   y            PROBLEM: Stress ulcer proph:    na                      PROBLEM: VTE prophylaxis:      lvnx 40 (2/23)   PROBLEM: Glycemic control:    iss (2/23)  PROBLEM: Nutrition:    DASH (2/23)   PROBLEM: Advanced directive: na     PROBLEM: Allergies:  na    DESCRIPTION ASSESSMENT RECOMMENDATION PATIENT COMMODORE YUE 2/23/2019  ADMISSION [facility] HOSPITALIST   77 y/o male COPD was admitted 2/22/2019  w/ SOB and increased work of breathing.   PMH HTN, DM2 (on home Metformin and glipizide), COPD (2L home/ BIPAP at night), CAD with 3v CABG 2004, HLD, 10/26/2018 ECHO ef 55% hx hypercapnic resp failure with recent intubation c/b with cardiac arrest (12/2018), presenting to ED with SOB x 1 d and was found to be in ac on chr resp acidosis Type 2 resp failure   HOSPITAL COURSE   CAD    hx CABG x3, 10/26/2018 ECHO ef 55%  12/20/2017 ECHO  n lvsf diast dysfn ef 55%  Plavix 75 (2/23) Atorvastat 40 (2/23)  ASA 81 (2/23) Losartan 50 (2/23) Metoprolol 12.5x2 (2/23) were continued No active ischemia noted   DM iss (2/23)  BPH Tamsulosin .4 (2/23)            PLAN SYNOPSIS  TYPE 2 RESP FAILURE Target PO 90-95   2/24/2019 bpap 14/6/.24 736/58/54   2/24/2019 Increased fio2 to 28  Gas exchange improved   2/24/2019 dw family re pulm rehab Gave scrip   2/26/2019 Gave script to spouse to have bpap adjusted [phone] [id number] max 16 epap min 8 ps max 20 fio2 .24   COPD ex BD steroids adjusted Azithro for inflammation/infection 2/23 qtc 479  RO DVT 2/23 V duplex n   DISPO DC planning on po pred taper 30 mg over 6 d     TIME SPENT Over 25 minutes aggregate care time spent on encounter; activities included   direct patient care, counseling and/or coordinating care reviewing notes, lab data/ imaging , discussion with multidisciplinary team/ patient  /family. Risks, benefits, alternatives  discussed in detail.

## 2019-02-26 NOTE — PROGRESS NOTE ADULT - SUBJECTIVE AND OBJECTIVE BOX
[facility] -- Suad Aguilar, Génesis Louise Pannella, Patel, Savella  Office # [phone]    Follow Up:  SOB    Subjective/Observations: Awake and alert, sitting on the chair.  Nasal cannula on but denies SB, LA, or orthopnea.  Denies CP or palpitations.  Noted to have transient episodes of tachycardia up to 130's, likely during activity    REVIEW OF SYSTEMS: All other review of systems is negative unless indicated above    PAST MEDICAL & SURGICAL HISTORY:  PVD (peripheral vascular disease)  Hypertension  COPD (chronic obstructive pulmonary disease)  Osteomyelitis  Dyslipidemia  CAD (Coronary Artery Disease)  Diabetes Mellitus, Type II  Compound fracture: left leg  CABG (Coronary Artery Bypass Graft)    MEDICATIONS  (STANDING):  ALBUTerol    0.083% 2.5 milliGRAM(s) Nebulizer every 4 hours  aspirin enteric coated 81 milliGRAM(s) Oral daily  atorvastatin 40 milliGRAM(s) Oral at bedtime  azithromycin   Tablet 500 milliGRAM(s) Oral every 24 hours  buDESOnide 160 MICROgram(s)/formoterol 4.5 MICROgram(s) Inhaler 2 Puff(s) Inhalation two times a day  clopidogrel Tablet 75 milliGRAM(s) Oral daily  dextrose 5%. 1000 milliLiter(s) (50 mL/Hr) IV Continuous <Continuous>  dextrose 50% Injectable 12.5 Gram(s) IV Push once  dextrose 50% Injectable 25 Gram(s) IV Push once  dextrose 50% Injectable 25 Gram(s) IV Push once  enoxaparin Injectable 40 milliGRAM(s) SubCutaneous daily  insulin glargine Injectable (LANTUS) 5 Unit(s) SubCutaneous at bedtime  insulin lispro (HumaLOG) corrective regimen sliding scale   SubCutaneous three times a day before meals  insulin lispro (HumaLOG) corrective regimen sliding scale   SubCutaneous at bedtime  losartan 50 milliGRAM(s) Oral daily  metoprolol tartrate 12.5 milliGRAM(s) Oral two times a day  multivitamin 1 Tablet(s) Oral daily  potassium chloride    Tablet ER 40 milliEquivalent(s) Oral once  predniSONE   Tablet 30 milliGRAM(s) Oral once  tamsulosin 0.4 milliGRAM(s) Oral at bedtime  tiotropium 18 MICROgram(s) Capsule 1 Capsule(s) Inhalation daily    MEDICATIONS  (PRN):  dextrose 40% Gel 15 Gram(s) Oral once PRN Blood Glucose LESS THAN 70 milliGRAM(s)/deciliter  glucagon  Injectable 1 milliGRAM(s) IntraMuscular once PRN Glucose LESS THAN 70 milligrams/deciliter    Allergies    No Known Allergies    Intolerances    shellfish (Nausea)    Vital Signs Last 24 Hrs  T(C): 36.7 (26 Feb 2019 07:41), Max: 36.8 (25 Feb 2019 16:27)  T(F): 98 (26 Feb 2019 07:41), Max: 98.2 (25 Feb 2019 16:27)  HR: 91 (26 Feb 2019 11:18) (72 - 98)  BP: 113/69 (26 Feb 2019 07:41) (97/62 - 121/70)  BP(mean): --  RR: 18 (26 Feb 2019 07:41) (16 - 18)  SpO2: 97% (26 Feb 2019 11:18) (96% - 100%)    I&O's Summary    25 Feb 2019 07:01  -  26 Feb 2019 07:00  --------------------------------------------------------  IN: 300 mL / OUT: 800 mL / NET: -500 mL    PHYSICAL EXAM:  TELE: NSR   Constitutional: NAD, awake and alert, obese  HEENT: Moist Mucous Membranes, Anicteric  Pulmonary: Non-labored, breath sounds are diminished bilaterally, No wheezing, rales or rhonchi  Cardiovascular: Regular, S1 and S2, No murmurs, rubs, gallops or clicks  Gastrointestinal: Bowel Sounds present, soft, nontender.   Lymph: No peripheral edema. No lymphadenopathy.  Skin: No visible rashes or ulcers.  Psych:  Mood & affect appropriate    LABS: All Labs Reviewed:                        11.9   7.59  )-----------( 244      ( 26 Feb 2019 07:06 )             38.4                         11.5   9.25  )-----------( 242      ( 25 Feb 2019 07:09 )             37.3                         11.6   10.17 )-----------( 272      ( 24 Feb 2019 05:51 )             37.3     26 Feb 2019 07:06    143    |  102    |  18     ----------------------------<  181    3.6     |  36     |  1.00   25 Feb 2019 07:09    143    |  102    |  14     ----------------------------<  185    4.0     |  35     |  1.10   24 Feb 2019 05:51    142    |  104    |  17     ----------------------------<  266    4.5     |  32     |  1.10     Ca    9.2        26 Feb 2019 07:06  Ca    9.4        25 Feb 2019 07:09  Ca    9.3        24 Feb 2019 05:51  Phos  2.4       24 Feb 2019 05:51    TPro  5.9    /  Alb  3.0    /  TBili  0.3    /  DBili  x      /  AST  14     /  ALT  39     /  AlkPhos  90     24 Feb 2019 05:51    < from: TTE Echo Doppler w/o Cont (10.26.18 @ 19:52) >     EXAM:  ECHO TTE W/O CON COMP W/DOPPLR      PROCEDURE DATE:  10/26/2018      INTERPRETATION:  Ordering Physician: RICKEY TRUJILLO    Indication: Cardiac arrest    Technician:     Study Quality: Poor given that the patient was supine in the ICU   A complete echocardiographic study was performed utilizing standard   protocol including spectral and color Doppler in all echocardiographic   windows.    Height: 180 cm  Weight: 99 kg  Blood Pressure: 54/74    MEASUREMENTS  IVS: 0.99 cm  PWT: 0.78cm  LA: 3.4cm  AO: 2.9cm  LVIDd: Unable to measured given poor endocardial border definition  LVIDs: Unable to measure given poor endocardial border definition      LVEF: Visually estimated in the LV short axis view (best images of the   LV) is 55-60 %  RVSP: Unable to assess given lack of adequate TR waveform  RA Pressure: 15 mmHg  IVC: Dilated at 2.4 cm in diameter and collapses less than 50% with   inspiration (not valid if patient is intubated and on mechanical   ventilatory support)    FINDINGS  Left Ventricle: Over the left ventricle is poorly visualized. There was   best visualized in the LV short axis view. The visual estimation of the   ejection fraction is 55-60%. I'm unable to rule out regional wall motion   abnormalities given the poor acoustic windows.  Right Ventricle: Overall poorly visualized  Left Atrium: Grossly normal in size  Right Atrium: Overall poorly visualized  Mitral Valve: Normal in structure with trace mitral valve regurgitation  Aortic Valve: Trileaflet and sclerotic with no evidence of significant   insufficiency. No stenosis  Tricuspid Valve: Overall poorly visualized. There was trace tricuspid   valve regurgitation  Pulmonic Valve: Poorly visualized and poor Doppler interrogation  Diastolic Function: The LV diastolic function is indeterminate in this   study  Pericardium/Pleura: No significant pericardial or pleural effusions noted    CONCLUSIONS:  1. Normal technically limited study.  2. The left ventricle was best visualized in the shortaxis view. The LV   systolic function in that view appears preserved with a visually   estimated ejection fraction of 55-60%.  3. I'm unable to rule out regional wall motion abnormalities given the   poor endomyocardial border definition.  4. Normal mitral valve with trace regurgitation.  5. Sclerotic trileaflet aortic valve with no insufficiency or stenosis.  6. Poorly visualized right-sided chambers.  7. Limited study to assess pulmonary artery systolic pressure.      MAC TAYLOR   This document has been electronically signed. Oct 27 2018 11:31AM     < end of copied text >    < from: Xray Chest 1 View AP/PA (02.23.19 @ 13:01) >    EXAM:  XR CHEST AP OR PA 1V                          PROCEDURE DATE:  02/23/2019      INTERPRETATION:  Chest 1 view    HISTORY: Weakness and shortness of breath    Comparison: 2/10/2019    Radiographic examination shows the heart to be normalin size. The lungs   are hyperinflated but clear. There is no evidence of focal infiltrate,   pneumothorax or pleural effusion. Sternal wires are again noted.    IMPRESSION: Hyperinflated lungs.    Thank you for this referral.    KAREN SHARMA M.D., ATTENDING RADIOLOGIST  This document has been electronically signed. Feb 23 2019  1:08PM      < end of copied text > [facility] -- Saud Aguilar, Génesis Louise Pannella, Patel, Savella  Office # [phone]    Follow Up:  SOB    Subjective/Observations: Awake and alert, sitting on the chair.  Nasal cannula on but denies SB, LA, or orthopnea.  Denies CP or palpitations.  Noted to have transient episodes of tachycardia up to 130's, likely during activity    REVIEW OF SYSTEMS: All other review of systems is negative unless indicated above    PAST MEDICAL & SURGICAL HISTORY:  PVD (peripheral vascular disease)  Hypertension  COPD (chronic obstructive pulmonary disease)  Osteomyelitis  Dyslipidemia  CAD (Coronary Artery Disease)  Diabetes Mellitus, Type II  Compound fracture: left leg  CABG (Coronary Artery Bypass Graft)    MEDICATIONS  (STANDING):  ALBUTerol    0.083% 2.5 milliGRAM(s) Nebulizer every 4 hours  aspirin enteric coated 81 milliGRAM(s) Oral daily  atorvastatin 40 milliGRAM(s) Oral at bedtime  azithromycin   Tablet 500 milliGRAM(s) Oral every 24 hours  buDESOnide 160 MICROgram(s)/formoterol 4.5 MICROgram(s) Inhaler 2 Puff(s) Inhalation two times a day  clopidogrel Tablet 75 milliGRAM(s) Oral daily  dextrose 5%. 1000 milliLiter(s) (50 mL/Hr) IV Continuous <Continuous>  dextrose 50% Injectable 12.5 Gram(s) IV Push once  dextrose 50% Injectable 25 Gram(s) IV Push once  dextrose 50% Injectable 25 Gram(s) IV Push once  enoxaparin Injectable 40 milliGRAM(s) SubCutaneous daily  insulin glargine Injectable (LANTUS) 5 Unit(s) SubCutaneous at bedtime  insulin lispro (HumaLOG) corrective regimen sliding scale   SubCutaneous three times a day before meals  insulin lispro (HumaLOG) corrective regimen sliding scale   SubCutaneous at bedtime  losartan 50 milliGRAM(s) Oral daily  metoprolol tartrate 12.5 milliGRAM(s) Oral two times a day  multivitamin 1 Tablet(s) Oral daily  potassium chloride    Tablet ER 40 milliEquivalent(s) Oral once  predniSONE   Tablet 30 milliGRAM(s) Oral once  tamsulosin 0.4 milliGRAM(s) Oral at bedtime  tiotropium 18 MICROgram(s) Capsule 1 Capsule(s) Inhalation daily    MEDICATIONS  (PRN):  dextrose 40% Gel 15 Gram(s) Oral once PRN Blood Glucose LESS THAN 70 milliGRAM(s)/deciliter  glucagon  Injectable 1 milliGRAM(s) IntraMuscular once PRN Glucose LESS THAN 70 milligrams/deciliter    Allergies    No Known Allergies    Intolerances    shellfish (Nausea)    Vital Signs Last 24 Hrs  T(C): 36.7 (26 Feb 2019 07:41), Max: 36.8 (25 Feb 2019 16:27)  T(F): 98 (26 Feb 2019 07:41), Max: 98.2 (25 Feb 2019 16:27)  HR: 91 (26 Feb 2019 11:18) (72 - 98)  BP: 113/69 (26 Feb 2019 07:41) (97/62 - 121/70)  BP(mean): --  RR: 18 (26 Feb 2019 07:41) (16 - 18)  SpO2: 97% (26 Feb 2019 11:18) (96% - 100%)    I&O's Summary    25 Feb 2019 07:01  -  26 Feb 2019 07:00  --------------------------------------------------------  IN: 300 mL / OUT: 800 mL / NET: -500 mL    PHYSICAL EXAM:  TELE: NSR   Constitutional: NAD, awake and alert, obese  HEENT: Moist Mucous Membranes, Anicteric  Pulmonary: Non-labored, breath sounds are diminished bilaterally, no rales or rhonchi  Cardiovascular: Regular, S1 and S2, No murmurs, rubs, gallops or clicks  Gastrointestinal: Bowel Sounds present, soft, nontender.   Lymph: No peripheral edema. No lymphadenopathy.  Skin: No visible rashes or ulcers.  Psych:  Mood & affect appropriate    LABS: All Labs Reviewed:                        11.9   7.59  )-----------( 244      ( 26 Feb 2019 07:06 )             38.4                         11.5   9.25  )-----------( 242      ( 25 Feb 2019 07:09 )             37.3                         11.6   10.17 )-----------( 272      ( 24 Feb 2019 05:51 )             37.3     26 Feb 2019 07:06    143    |  102    |  18     ----------------------------<  181    3.6     |  36     |  1.00   25 Feb 2019 07:09    143    |  102    |  14     ----------------------------<  185    4.0     |  35     |  1.10   24 Feb 2019 05:51    142    |  104    |  17     ----------------------------<  266    4.5     |  32     |  1.10     Ca    9.2        26 Feb 2019 07:06  Ca    9.4        25 Feb 2019 07:09  Ca    9.3        24 Feb 2019 05:51  Phos  2.4       24 Feb 2019 05:51    TPro  5.9    /  Alb  3.0    /  TBili  0.3    /  DBili  x      /  AST  14     /  ALT  39     /  AlkPhos  90     24 Feb 2019 05:51    < from: TTE Echo Doppler w/o Cont (10.26.18 @ 19:52) >     EXAM:  ECHO TTE W/O CON COMP W/DOPPLR      PROCEDURE DATE:  10/26/2018      INTERPRETATION:  Ordering Physician: RICKEY TRUJILLO    Indication: Cardiac arrest    Technician:     Study Quality: Poor given that the patient was supine in the ICU   A complete echocardiographic study was performed utilizing standard   protocol including spectral and color Doppler in all echocardiographic   windows.    Height: 180 cm  Weight: 99 kg  Blood Pressure: 54/74    MEASUREMENTS  IVS: 0.99 cm  PWT: 0.78cm  LA: 3.4cm  AO: 2.9cm  LVIDd: Unable to measured given poor endocardial border definition  LVIDs: Unable to measure given poor endocardial border definition      LVEF: Visually estimated in the LV short axis view (best images of the   LV) is 55-60 %  RVSP: Unable to assess given lack of adequate TR waveform  RA Pressure: 15 mmHg  IVC: Dilated at 2.4 cm in diameter and collapses less than 50% with   inspiration (not valid if patient is intubated and on mechanical   ventilatory support)    FINDINGS  Left Ventricle: Over the left ventricle is poorly visualized. There was   best visualized in the LV short axis view. The visual estimation of the   ejection fraction is 55-60%. I'm unable to rule out regional wall motion   abnormalities given the poor acoustic windows.  Right Ventricle: Overall poorly visualized  Left Atrium: Grossly normal in size  Right Atrium: Overall poorly visualized  Mitral Valve: Normal in structure with trace mitral valve regurgitation  Aortic Valve: Trileaflet and sclerotic with no evidence of significant   insufficiency. No stenosis  Tricuspid Valve: Overall poorly visualized. There was trace tricuspid   valve regurgitation  Pulmonic Valve: Poorly visualized and poor Doppler interrogation  Diastolic Function: The LV diastolic function is indeterminate in this   study  Pericardium/Pleura: No significant pericardial or pleural effusions noted    CONCLUSIONS:  1. Normal technically limited study.  2. The left ventricle was best visualized in the shortaxis view. The LV   systolic function in that view appears preserved with a visually   estimated ejection fraction of 55-60%.  3. I'm unable to rule out regional wall motion abnormalities given the   poor endomyocardial border definition.  4. Normal mitral valve with trace regurgitation.  5. Sclerotic trileaflet aortic valve with no insufficiency or stenosis.  6. Poorly visualized right-sided chambers.  7. Limited study to assess pulmonary artery systolic pressure.      MAC TAYLOR   This document has been electronically signed. Oct 27 2018 11:31AM     < end of copied text >    < from: Xray Chest 1 View AP/PA (02.23.19 @ 13:01) >    EXAM:  XR CHEST AP OR PA 1V                          PROCEDURE DATE:  02/23/2019      INTERPRETATION:  Chest 1 view    HISTORY: Weakness and shortness of breath    Comparison: 2/10/2019    Radiographic examination shows the heart to be normalin size. The lungs   are hyperinflated but clear. There is no evidence of focal infiltrate,   pneumothorax or pleural effusion. Sternal wires are again noted.    IMPRESSION: Hyperinflated lungs.    Thank you for this referral.    KAREN SHARMA M.D., ATTENDING RADIOLOGIST  This document has been electronically signed. Feb 23 2019  1:08PM      < end of copied text >

## 2019-02-26 NOTE — PHYSICAL THERAPY INITIAL EVALUATION ADULT - PERTINENT HX OF CURRENT PROBLEM, REHAB EVAL
79 y/o male adm 2/23 with SOB- Dopplers: negative. DVT. Influenza negative. RSV negative. Pt admitted with COPD exacerbation.

## 2019-03-11 ENCOUNTER — APPOINTMENT (OUTPATIENT)
Dept: VASCULAR SURGERY | Facility: CLINIC | Age: 79
End: 2019-03-11

## 2019-03-11 ENCOUNTER — INPATIENT (INPATIENT)
Facility: HOSPITAL | Age: 79
LOS: 2 days | Discharge: HOME | DRG: 189 | End: 2019-03-14
Attending: INTERNAL MEDICINE | Admitting: STUDENT IN AN ORGANIZED HEALTH CARE EDUCATION/TRAINING PROGRAM
Payer: COMMERCIAL

## 2019-03-11 VITALS
HEIGHT: 70 IN | OXYGEN SATURATION: 97 % | WEIGHT: 250 LBS | HEART RATE: 120 BPM | DIASTOLIC BLOOD PRESSURE: 84 MMHG | TEMPERATURE: 98 F | RESPIRATION RATE: 18 BRPM | SYSTOLIC BLOOD PRESSURE: 150 MMHG

## 2019-03-11 DIAGNOSIS — I10 ESSENTIAL (PRIMARY) HYPERTENSION: ICD-10-CM

## 2019-03-11 DIAGNOSIS — J96.20 ACUTE AND CHRONIC RESPIRATORY FAILURE, UNSPECIFIED WHETHER WITH HYPOXIA OR HYPERCAPNIA: ICD-10-CM

## 2019-03-11 DIAGNOSIS — Z29.9 ENCOUNTER FOR PROPHYLACTIC MEASURES, UNSPECIFIED: ICD-10-CM

## 2019-03-11 DIAGNOSIS — J44.1 CHRONIC OBSTRUCTIVE PULMONARY DISEASE WITH (ACUTE) EXACERBATION: ICD-10-CM

## 2019-03-11 DIAGNOSIS — T14.8XXA OTHER INJURY OF UNSPECIFIED BODY REGION, INITIAL ENCOUNTER: Chronic | ICD-10-CM

## 2019-03-11 DIAGNOSIS — E11.9 TYPE 2 DIABETES MELLITUS WITHOUT COMPLICATIONS: ICD-10-CM

## 2019-03-11 DIAGNOSIS — I25.10 ATHEROSCLEROTIC HEART DISEASE OF NATIVE CORONARY ARTERY WITHOUT ANGINA PECTORIS: ICD-10-CM

## 2019-03-11 DIAGNOSIS — E78.5 HYPERLIPIDEMIA, UNSPECIFIED: ICD-10-CM

## 2019-03-11 LAB
ALBUMIN SERPL ELPH-MCNC: 3.8 G/DL — SIGNIFICANT CHANGE UP (ref 3.3–5)
ALP SERPL-CCNC: 111 U/L — SIGNIFICANT CHANGE UP (ref 40–120)
ALT FLD-CCNC: 33 U/L — SIGNIFICANT CHANGE UP (ref 12–78)
ANION GAP SERPL CALC-SCNC: 6 MMOL/L — SIGNIFICANT CHANGE UP (ref 5–17)
APPEARANCE UR: CLEAR — SIGNIFICANT CHANGE UP
APTT BLD: 36.2 SEC — SIGNIFICANT CHANGE UP (ref 27.5–36.3)
AST SERPL-CCNC: 23 U/L — SIGNIFICANT CHANGE UP (ref 15–37)
BACTERIA # UR AUTO: ABNORMAL
BASE EXCESS BLDA CALC-SCNC: 0.3 MMOL/L — SIGNIFICANT CHANGE UP (ref -2–2)
BASE EXCESS BLDA CALC-SCNC: 0.9 MMOL/L — SIGNIFICANT CHANGE UP (ref -2–2)
BASE EXCESS BLDA CALC-SCNC: 2.1 MMOL/L — HIGH (ref -2–2)
BASE EXCESS BLDA CALC-SCNC: 2.9 MMOL/L — HIGH (ref -2–2)
BASOPHILS # BLD AUTO: 0.05 K/UL — SIGNIFICANT CHANGE UP (ref 0–0.2)
BASOPHILS NFR BLD AUTO: 0.4 % — SIGNIFICANT CHANGE UP (ref 0–2)
BILIRUB SERPL-MCNC: 0.3 MG/DL — SIGNIFICANT CHANGE UP (ref 0.2–1.2)
BILIRUB UR-MCNC: NEGATIVE — SIGNIFICANT CHANGE UP
BLOOD GAS COMMENTS ARTERIAL: SIGNIFICANT CHANGE UP
BUN SERPL-MCNC: 11 MG/DL — SIGNIFICANT CHANGE UP (ref 7–23)
CALCIUM SERPL-MCNC: 9.4 MG/DL — SIGNIFICANT CHANGE UP (ref 8.5–10.1)
CHLORIDE SERPL-SCNC: 105 MMOL/L — SIGNIFICANT CHANGE UP (ref 96–108)
CK MB CFR SERPL CALC: 4.1 NG/ML — HIGH (ref 0–3.6)
CO2 SERPL-SCNC: 30 MMOL/L — SIGNIFICANT CHANGE UP (ref 22–31)
COD CRY URNS QL: ABNORMAL
COLOR SPEC: YELLOW — SIGNIFICANT CHANGE UP
CREAT SERPL-MCNC: 1.2 MG/DL — SIGNIFICANT CHANGE UP (ref 0.5–1.3)
DIFF PNL FLD: NEGATIVE — SIGNIFICANT CHANGE UP
EOSINOPHIL # BLD AUTO: 1.12 K/UL — HIGH (ref 0–0.5)
EOSINOPHIL NFR BLD AUTO: 9.1 % — HIGH (ref 0–6)
EPI CELLS # UR: NEGATIVE — SIGNIFICANT CHANGE UP
GLUCOSE SERPL-MCNC: 201 MG/DL — HIGH (ref 70–99)
GLUCOSE UR QL: 1000 MG/DL
HCO3 BLDA-SCNC: 24 MMOL/L — SIGNIFICANT CHANGE UP (ref 23–27)
HCO3 BLDA-SCNC: 26 MMOL/L — SIGNIFICANT CHANGE UP (ref 23–27)
HCT VFR BLD CALC: 43.9 % — SIGNIFICANT CHANGE UP (ref 39–50)
HGB BLD-MCNC: 13.9 G/DL — SIGNIFICANT CHANGE UP (ref 13–17)
HOROWITZ INDEX BLDA+IHG-RTO: 0.4 — SIGNIFICANT CHANGE UP
HOROWITZ INDEX BLDA+IHG-RTO: 30 — SIGNIFICANT CHANGE UP
HOROWITZ INDEX BLDA+IHG-RTO: 30 — SIGNIFICANT CHANGE UP
HOROWITZ INDEX BLDA+IHG-RTO: 44 — SIGNIFICANT CHANGE UP
IMM GRANULOCYTES NFR BLD AUTO: 0.6 % — SIGNIFICANT CHANGE UP (ref 0–1.5)
INR BLD: 0.92 RATIO — SIGNIFICANT CHANGE UP (ref 0.88–1.16)
KETONES UR-MCNC: ABNORMAL
LACTATE SERPL-SCNC: 1 MMOL/L — SIGNIFICANT CHANGE UP (ref 0.7–2)
LEUKOCYTE ESTERASE UR-ACNC: NEGATIVE — SIGNIFICANT CHANGE UP
LYMPHOCYTES # BLD AUTO: 18.3 % — SIGNIFICANT CHANGE UP (ref 13–44)
LYMPHOCYTES # BLD AUTO: 2.26 K/UL — SIGNIFICANT CHANGE UP (ref 1–3.3)
MAGNESIUM SERPL-MCNC: 1.6 MG/DL — SIGNIFICANT CHANGE UP (ref 1.6–2.6)
MCHC RBC-ENTMCNC: 28.7 PG — SIGNIFICANT CHANGE UP (ref 27–34)
MCHC RBC-ENTMCNC: 31.7 GM/DL — LOW (ref 32–36)
MCV RBC AUTO: 90.5 FL — SIGNIFICANT CHANGE UP (ref 80–100)
MONOCYTES # BLD AUTO: 0.97 K/UL — HIGH (ref 0–0.9)
MONOCYTES NFR BLD AUTO: 7.9 % — SIGNIFICANT CHANGE UP (ref 2–14)
NEUTROPHILS # BLD AUTO: 7.85 K/UL — HIGH (ref 1.8–7.4)
NEUTROPHILS NFR BLD AUTO: 63.7 % — SIGNIFICANT CHANGE UP (ref 43–77)
NITRITE UR-MCNC: NEGATIVE — SIGNIFICANT CHANGE UP
NRBC # BLD: 0 /100 WBCS — SIGNIFICANT CHANGE UP (ref 0–0)
NT-PROBNP SERPL-SCNC: 144 PG/ML — SIGNIFICANT CHANGE UP (ref 0–450)
PCO2 BLDA: 56 MMHG — HIGH (ref 32–46)
PCO2 BLDA: 58 MMHG — HIGH (ref 32–46)
PCO2 BLDA: 71 MMHG — CRITICAL HIGH (ref 32–46)
PCO2 BLDA: 73 MMHG — CRITICAL HIGH (ref 32–46)
PH BLDA: 7.21 — LOW (ref 7.35–7.45)
PH BLDA: 7.24 — LOW (ref 7.35–7.45)
PH BLDA: 7.29 — LOW (ref 7.35–7.45)
PH BLDA: 7.31 — LOW (ref 7.35–7.45)
PH UR: 5 — SIGNIFICANT CHANGE UP (ref 5–8)
PLATELET # BLD AUTO: 257 K/UL — SIGNIFICANT CHANGE UP (ref 150–400)
PO2 BLDA: 101 MMHG — SIGNIFICANT CHANGE UP (ref 74–108)
PO2 BLDA: 115 MMHG — HIGH (ref 74–108)
PO2 BLDA: 138 MMHG — HIGH (ref 74–108)
PO2 BLDA: 139 MMHG — HIGH (ref 74–108)
POTASSIUM SERPL-MCNC: 4.5 MMOL/L — SIGNIFICANT CHANGE UP (ref 3.5–5.3)
POTASSIUM SERPL-SCNC: 4.5 MMOL/L — SIGNIFICANT CHANGE UP (ref 3.5–5.3)
PROT SERPL-MCNC: 7.5 G/DL — SIGNIFICANT CHANGE UP (ref 6–8.3)
PROT UR-MCNC: 75 MG/DL
PROTHROM AB SERPL-ACNC: 10.4 SEC — SIGNIFICANT CHANGE UP (ref 10–12.9)
RBC # BLD: 4.85 M/UL — SIGNIFICANT CHANGE UP (ref 4.2–5.8)
RBC # FLD: 13.3 % — SIGNIFICANT CHANGE UP (ref 10.3–14.5)
RBC CASTS # UR COMP ASSIST: SIGNIFICANT CHANGE UP /HPF (ref 0–4)
SAO2 % BLDA: 98 % — HIGH (ref 92–96)
SAO2 % BLDA: 98 % — HIGH (ref 92–96)
SAO2 % BLDA: 99 % — HIGH (ref 92–96)
SAO2 % BLDA: 99 % — HIGH (ref 92–96)
SODIUM SERPL-SCNC: 141 MMOL/L — SIGNIFICANT CHANGE UP (ref 135–145)
SP GR SPEC: 1.02 — SIGNIFICANT CHANGE UP (ref 1.01–1.02)
TROPONIN I SERPL-MCNC: <.015 NG/ML — SIGNIFICANT CHANGE UP (ref 0.01–0.04)
UROBILINOGEN FLD QL: NEGATIVE — SIGNIFICANT CHANGE UP
WBC # BLD: 12.33 K/UL — HIGH (ref 3.8–10.5)
WBC # FLD AUTO: 12.33 K/UL — HIGH (ref 3.8–10.5)
WBC UR QL: SIGNIFICANT CHANGE UP

## 2019-03-11 PROCEDURE — 71045 X-RAY EXAM CHEST 1 VIEW: CPT | Mod: 26

## 2019-03-11 PROCEDURE — 99285 EMERGENCY DEPT VISIT HI MDM: CPT

## 2019-03-11 PROCEDURE — 93010 ELECTROCARDIOGRAM REPORT: CPT

## 2019-03-11 PROCEDURE — 99223 1ST HOSP IP/OBS HIGH 75: CPT | Mod: AI

## 2019-03-11 PROCEDURE — 93970 EXTREMITY STUDY: CPT | Mod: 26

## 2019-03-11 RX ORDER — INSULIN LISPRO 100/ML
VIAL (ML) SUBCUTANEOUS EVERY 6 HOURS
Qty: 0 | Refills: 0 | Status: DISCONTINUED | OUTPATIENT
Start: 2019-03-11 | End: 2019-03-12

## 2019-03-11 RX ORDER — BUDESONIDE AND FORMOTEROL FUMARATE DIHYDRATE 160; 4.5 UG/1; UG/1
2 AEROSOL RESPIRATORY (INHALATION)
Qty: 0 | Refills: 0 | Status: DISCONTINUED | OUTPATIENT
Start: 2019-03-11 | End: 2019-03-14

## 2019-03-11 RX ORDER — ASPIRIN/CALCIUM CARB/MAGNESIUM 324 MG
81 TABLET ORAL DAILY
Qty: 0 | Refills: 0 | Status: DISCONTINUED | OUTPATIENT
Start: 2019-03-11 | End: 2019-03-14

## 2019-03-11 RX ORDER — GLUCAGON INJECTION, SOLUTION 0.5 MG/.1ML
1 INJECTION, SOLUTION SUBCUTANEOUS ONCE
Qty: 0 | Refills: 0 | Status: DISCONTINUED | OUTPATIENT
Start: 2019-03-11 | End: 2019-03-14

## 2019-03-11 RX ORDER — AZITHROMYCIN 500 MG/1
500 TABLET, FILM COATED ORAL ONCE
Qty: 0 | Refills: 0 | Status: COMPLETED | OUTPATIENT
Start: 2019-03-11 | End: 2019-03-11

## 2019-03-11 RX ORDER — SODIUM CHLORIDE 9 MG/ML
1000 INJECTION INTRAMUSCULAR; INTRAVENOUS; SUBCUTANEOUS ONCE
Qty: 0 | Refills: 0 | Status: COMPLETED | OUTPATIENT
Start: 2019-03-11 | End: 2019-03-11

## 2019-03-11 RX ORDER — METOPROLOL TARTRATE 50 MG
12.5 TABLET ORAL
Qty: 0 | Refills: 0 | Status: DISCONTINUED | OUTPATIENT
Start: 2019-03-11 | End: 2019-03-14

## 2019-03-11 RX ORDER — SODIUM CHLORIDE 9 MG/ML
1000 INJECTION, SOLUTION INTRAVENOUS
Qty: 0 | Refills: 0 | Status: DISCONTINUED | OUTPATIENT
Start: 2019-03-11 | End: 2019-03-14

## 2019-03-11 RX ORDER — ENOXAPARIN SODIUM 100 MG/ML
40 INJECTION SUBCUTANEOUS DAILY
Qty: 0 | Refills: 0 | Status: DISCONTINUED | OUTPATIENT
Start: 2019-03-11 | End: 2019-03-14

## 2019-03-11 RX ORDER — CLOPIDOGREL BISULFATE 75 MG/1
75 TABLET, FILM COATED ORAL DAILY
Qty: 0 | Refills: 0 | Status: DISCONTINUED | OUTPATIENT
Start: 2019-03-11 | End: 2019-03-14

## 2019-03-11 RX ORDER — INSULIN LISPRO 100/ML
VIAL (ML) SUBCUTANEOUS
Qty: 0 | Refills: 0 | Status: DISCONTINUED | OUTPATIENT
Start: 2019-03-11 | End: 2019-03-11

## 2019-03-11 RX ORDER — DEXTROSE 50 % IN WATER 50 %
25 SYRINGE (ML) INTRAVENOUS ONCE
Qty: 0 | Refills: 0 | Status: DISCONTINUED | OUTPATIENT
Start: 2019-03-11 | End: 2019-03-14

## 2019-03-11 RX ORDER — LOSARTAN POTASSIUM 100 MG/1
25 TABLET, FILM COATED ORAL DAILY
Qty: 0 | Refills: 0 | Status: DISCONTINUED | OUTPATIENT
Start: 2019-03-11 | End: 2019-03-14

## 2019-03-11 RX ORDER — DEXTROSE 50 % IN WATER 50 %
12.5 SYRINGE (ML) INTRAVENOUS ONCE
Qty: 0 | Refills: 0 | Status: DISCONTINUED | OUTPATIENT
Start: 2019-03-11 | End: 2019-03-14

## 2019-03-11 RX ORDER — ROFLUMILAST 500 UG/1
1 TABLET ORAL
Qty: 0 | Refills: 0 | COMMUNITY

## 2019-03-11 RX ORDER — AZITHROMYCIN 500 MG/1
500 TABLET, FILM COATED ORAL EVERY 24 HOURS
Qty: 0 | Refills: 0 | Status: DISCONTINUED | OUTPATIENT
Start: 2019-03-12 | End: 2019-03-13

## 2019-03-11 RX ORDER — BUDESONIDE, MICRONIZED 100 %
0.5 POWDER (GRAM) MISCELLANEOUS EVERY 12 HOURS
Qty: 0 | Refills: 0 | Status: DISCONTINUED | OUTPATIENT
Start: 2019-03-11 | End: 2019-03-14

## 2019-03-11 RX ORDER — INSULIN LISPRO 100/ML
VIAL (ML) SUBCUTANEOUS AT BEDTIME
Qty: 0 | Refills: 0 | Status: DISCONTINUED | OUTPATIENT
Start: 2019-03-11 | End: 2019-03-11

## 2019-03-11 RX ORDER — TAMSULOSIN HYDROCHLORIDE 0.4 MG/1
0.4 CAPSULE ORAL AT BEDTIME
Qty: 0 | Refills: 0 | Status: DISCONTINUED | OUTPATIENT
Start: 2019-03-11 | End: 2019-03-14

## 2019-03-11 RX ORDER — DEXTROSE 50 % IN WATER 50 %
15 SYRINGE (ML) INTRAVENOUS ONCE
Qty: 0 | Refills: 0 | Status: DISCONTINUED | OUTPATIENT
Start: 2019-03-11 | End: 2019-03-14

## 2019-03-11 RX ORDER — ALBUTEROL 90 UG/1
2.5 AEROSOL, METERED ORAL EVERY 4 HOURS
Qty: 0 | Refills: 0 | Status: DISCONTINUED | OUTPATIENT
Start: 2019-03-11 | End: 2019-03-14

## 2019-03-11 RX ORDER — ATORVASTATIN CALCIUM 80 MG/1
40 TABLET, FILM COATED ORAL AT BEDTIME
Qty: 0 | Refills: 0 | Status: DISCONTINUED | OUTPATIENT
Start: 2019-03-11 | End: 2019-03-14

## 2019-03-11 RX ORDER — AZITHROMYCIN 500 MG/1
TABLET, FILM COATED ORAL
Qty: 0 | Refills: 0 | Status: DISCONTINUED | OUTPATIENT
Start: 2019-03-11 | End: 2019-03-13

## 2019-03-11 RX ORDER — IPRATROPIUM/ALBUTEROL SULFATE 18-103MCG
3 AEROSOL WITH ADAPTER (GRAM) INHALATION ONCE
Qty: 0 | Refills: 0 | Status: COMPLETED | OUTPATIENT
Start: 2019-03-11 | End: 2019-03-11

## 2019-03-11 RX ORDER — TIOTROPIUM BROMIDE 18 UG/1
1 CAPSULE ORAL; RESPIRATORY (INHALATION) DAILY
Qty: 0 | Refills: 0 | Status: DISCONTINUED | OUTPATIENT
Start: 2019-03-11 | End: 2019-03-14

## 2019-03-11 RX ADMIN — SODIUM CHLORIDE 1000 MILLILITER(S): 9 INJECTION INTRAMUSCULAR; INTRAVENOUS; SUBCUTANEOUS at 09:18

## 2019-03-11 RX ADMIN — Medication 3: at 11:28

## 2019-03-11 RX ADMIN — ALBUTEROL 2.5 MILLIGRAM(S): 90 AEROSOL, METERED ORAL at 15:12

## 2019-03-11 RX ADMIN — Medication 3 MILLILITER(S): at 08:17

## 2019-03-11 RX ADMIN — SODIUM CHLORIDE 50 MILLILITER(S): 9 INJECTION, SOLUTION INTRAVENOUS at 22:13

## 2019-03-11 RX ADMIN — ATORVASTATIN CALCIUM 40 MILLIGRAM(S): 80 TABLET, FILM COATED ORAL at 22:12

## 2019-03-11 RX ADMIN — TAMSULOSIN HYDROCHLORIDE 0.4 MILLIGRAM(S): 0.4 CAPSULE ORAL at 22:12

## 2019-03-11 RX ADMIN — Medication 40 MILLIGRAM(S): at 17:08

## 2019-03-11 RX ADMIN — AZITHROMYCIN 500 MILLIGRAM(S): 500 TABLET, FILM COATED ORAL at 10:45

## 2019-03-11 RX ADMIN — Medication 125 MILLIGRAM(S): at 08:17

## 2019-03-11 RX ADMIN — ALBUTEROL 2.5 MILLIGRAM(S): 90 AEROSOL, METERED ORAL at 11:38

## 2019-03-11 RX ADMIN — Medication 3: at 17:08

## 2019-03-11 RX ADMIN — Medication 81 MILLIGRAM(S): at 11:59

## 2019-03-11 RX ADMIN — ALBUTEROL 2.5 MILLIGRAM(S): 90 AEROSOL, METERED ORAL at 20:19

## 2019-03-11 RX ADMIN — Medication 12.5 MILLIGRAM(S): at 17:08

## 2019-03-11 RX ADMIN — ENOXAPARIN SODIUM 40 MILLIGRAM(S): 100 INJECTION SUBCUTANEOUS at 11:42

## 2019-03-11 RX ADMIN — CLOPIDOGREL BISULFATE 75 MILLIGRAM(S): 75 TABLET, FILM COATED ORAL at 11:59

## 2019-03-11 RX ADMIN — AZITHROMYCIN 255 MILLIGRAM(S): 500 TABLET, FILM COATED ORAL at 09:45

## 2019-03-11 RX ADMIN — SODIUM CHLORIDE 1000 MILLILITER(S): 9 INJECTION INTRAMUSCULAR; INTRAVENOUS; SUBCUTANEOUS at 08:17

## 2019-03-11 RX ADMIN — SODIUM CHLORIDE 50 MILLILITER(S): 9 INJECTION, SOLUTION INTRAVENOUS at 17:38

## 2019-03-11 RX ADMIN — LOSARTAN POTASSIUM 25 MILLIGRAM(S): 100 TABLET, FILM COATED ORAL at 17:08

## 2019-03-11 RX ADMIN — Medication 1 TABLET(S): at 11:59

## 2019-03-11 NOTE — ED ADULT NURSE NOTE - OBJECTIVE STATEMENT
Presents to ER with SOB. Pt states he was having difficulty breathing last night while wearing his BIPAP. Denies any fevers or chills.  Sepsis workup obtained. Wheezing noted to bilat lung fiels. Was given 2 Combivents in ambulance.

## 2019-03-11 NOTE — ED PROVIDER NOTE - NS ED ATTENDING STATEMENT MOD
Referred To Otolaryngology For Closure Text (Leave Blank If You Do Not Want): After obtaining clear surgical margins the patient was sent to otolaryngology for surgical repair.  The patient understands they will receive post-surgical care and follow-up from the referring physician's office. Attending Only

## 2019-03-11 NOTE — ED ADULT NURSE REASSESSMENT NOTE - NS ED NURSE REASSESS COMMENT FT1
STAT ABG obtained, acidotic on gas. RT Flora called, pt to be placed on BIPAP, requires admission. Family updated and made aware of current condition.

## 2019-03-11 NOTE — ED ADULT NURSE NOTE - CHPI ED NUR SYMPTOMS NEG
no hemoptysis/no body aches/no diaphoresis/no shortness of breath/no chest pain/no chills/no headache

## 2019-03-11 NOTE — H&P ADULT - HISTORY OF PRESENT ILLNESS
79 yo male with PMHx of HTN, DM2 (on home Metformin and glipizide), COPD (2L home/ BIPAP at night), CAD with 3v CABG 2004, HLD, hx hypercapnic resp failure with recent intubation c/b with cardiac arrest (12/2018), presenting to ED with SOB 77 yo male with PMHx of HTN, DM2 (on home Metformin and glipizide), COPD (2L home/ BIPAP at night), CAD with 3v CABG 2004, HLD, hx hypercapnic resp failure with recent intubation c/b with cardiac arrest (6/2018), presenting to ED with SOB . HIstory obtained from patients wife at bedside. Wife states that patient has history of copd on home o2 and bipap as needed. He has had multiple hospitalizations in the past year. The past couple of days pt stated he was feeling tired. She checked his blood pressure and his oxygen and states they were normal. He said he was going to rest and he slept yesterday during the day without his bipap. Later in the evening when he still wasnt feeling well he put his bipap on. He was still sating well - approx 93%- but felt he had increased work of breathing. Wife planned on driving him to the ED but when he tried to ambulate his breathing became extremely labored so she called EMS. Of note patient completed course of antibiotics and steroids back in february and was doing well until yesterday.     in the ED CBC, CMP, ABG, Blood cultures, mg, lactate, coags and cardiac enzymes were drawn. Significant for WBC 12.33, glucose 201, CKMB 4.1- ABG - 7.24/71/138/24. EKG revealed sinus tachycardia.  Pt was given zithromax x 1, NS bolus x 1, solumdrol 125 x1, albuterol neb and placed on BIPAP. CXR and LE dopplers pending.

## 2019-03-11 NOTE — H&P ADULT - ASSESSMENT
79 yo male with PMHx of HTN, DM2 (on home Metformin and glipizide), COPD (2L home/ BIPAP at night), CAD with 3v CABG 2004, HLD, hx hypercapnic resp failure with recent intubation c/b with cardiac arrest (12/2018), presenting to ED admitted with acute on chronic hypercapnic respiratory failure 77 yo male with PMHx of HTN, DM2 (on home Metformin and glipizide), COPD (2L home/ BIPAP at night), CAD with 3v CABG 2004, HLD, hx hypercapnic resp failure with recent intubation c/b with cardiac arrest (12/2018), presenting to ED admitted with acute on chronic hypercapnic respiratory failure 2/2 copd exacerbation

## 2019-03-11 NOTE — H&P ADULT - NSHPLABSRESULTS_GEN_ALL_CORE
13.9   12.33 )-----------( 257      ( 11 Mar 2019 08:07 )             43.9     11 Mar 2019 08:07    141    |  105    |  11     ----------------------------<  201    4.5     |  30     |  1.20     Ca    9.4        11 Mar 2019 08:07  Mg     1.6       11 Mar 2019 08:07    TPro  7.5    /  Alb  3.8    /  TBili  0.3    /  DBili  x      /  AST  23     /  ALT  33     /  AlkPhos  111    11 Mar 2019 08:07    LIVER FUNCTIONS - ( 11 Mar 2019 08:07 )  Alb: 3.8 g/dL / Pro: 7.5 g/dL / ALK PHOS: 111 U/L / ALT: 33 U/L / AST: 23 U/L / GGT: x           PT/INR - ( 11 Mar 2019 08:07 )   PT: 10.4 sec;   INR: 0.92 ratio         PTT - ( 11 Mar 2019 08:07 )  PTT:36.2 sec  CAPILLARY BLOOD GLUCOSE        CARDIAC MARKERS ( 11 Mar 2019 08:07 )  <.015 ng/mL / x     / x     / x     / 4.1 ng/mL

## 2019-03-11 NOTE — ED ADULT NURSE NOTE - NSIMPLEMENTINTERV_GEN_ALL_ED
Implemented All Universal Safety Interventions:  [place] to call system. Call bell, personal items and telephone within reach. Instruct patient to call for assistance. Room bathroom lighting operational. Non-slip footwear when patient is off stretcher. Physically safe environment: no spills, clutter or unnecessary equipment. Stretcher in lowest position, wheels locked, appropriate side rails in place.

## 2019-03-11 NOTE — H&P ADULT - PROBLEM SELECTOR PLAN 1
acute on chronic respiratory failure 2/2 copd exacerbation   currently on bipap , tolerating well   initial abg 7.24/71/138/24, repeat pending at 11   s/p zithromax x 1, continue   s/p solumedrol 125 x 1, c/w 40 BID   Evaluated by Dr. Oleg ashley - continue with albuterol nebs pumicort, symbicort and spirvia.   lower extremity dopplers and cxr pending   follow up blood cultures, & procalcitionin

## 2019-03-11 NOTE — H&P ADULT - NSHPREVIEWOFSYSTEMS_GEN_ALL_CORE
limited due to increased work of breathing     +SOB +Labored breathing   denies fevers, chills, nausea, vomiting diarrhea, chest pain, sick contacts

## 2019-03-11 NOTE — ED PROVIDER NOTE - OBJECTIVE STATEMENT
78 male presents to ER by ambulance from home with report of shortness of breath, received combivent neb x 2. Patient states last night he felt worsening of shortness of breath, wheezing, chest tightness, used his oxygen, nebulizer and BIPAP with no relief.

## 2019-03-11 NOTE — PHARMACOTHERAPY INTERVENTION NOTE - COMMENTS
Patient prescribed azithromycin IV for acute bronchitis.  Spoke with Dr. Dejesus to confirm utilization for inflammatory/possibly bacterial etiology.  Dr. Dejesus agreed - requested 5 days total therapy.  Recommended adding a stop date to the order - Dr. Dejesus agreed.

## 2019-03-11 NOTE — CONSULT NOTE ADULT - ASSESSMENT
COMMBRIANNE TURNER [facility] P    ALLERGY Shellfish  CONTACT Sp Amira EATON    REASON FOR VISIT Subsequent pulm fu   Initial evaluation/Pulmonary consultation requested  3/11/2019 from Dr Dejesus by Dr Alston    Patient examined chart reviewed  HOSPITAL ADMISSION [facility] P HOSPITALIST 3/11/2019  PATIENT CAME  FROM (if information available)      VITALS/LABS COMMODORE TURNER      3/11/2019 W 12.3 Hb 13.9 Plt 257 INR .9 Na 141 K 4.5 CO2 30 Cr 1.2   3/11/2019 Tr 1 n  3/11/2019 724/71/138 6l neb   3/11/2019 cxr copd ch  3/11/2019 V duplex legs n   3/11/2019 130 p 18/8/.30 729/56/101  3/11/2019 11a bpap 14/6/.35 721/73/139  3/11/2019 8a 6l 724/71/138    REVIEW OF SYMPTOMS    Able to give ROS  Yes     RELIABLE No   CONSTITUTIONAL Weakness Yes  Chills No Vision changes No  ENDOCRINE No unexplained hair loss No heat or cold intolerance    ALLERGY No hives  Sore throat No   RESP Coughing blood no  Shortness of breath YES   NEURO No Headache  Confusion Pain neck No   CARDIAC No Chest pain No Palpitations   GI No Pain abdomen NO   Vomiting NO     PHYSICAL EXAM    HEENT Unremarkable PERRLA atraumatic   RESP Fair air entry EXP prolonged    Harsh breath sound Resp distres mild   CARDIAC S1 S2 No S3     NO JVD    ABDOMEN SOFT BS PRESENT NOT DISTENDED No hepatosplenomegaly PEDAL EDEMA present No calf tenderness  NO rash   GENERAL Not TOXIC looking    GLOBAL ISSUE/BEST PRACTICE:      PROBLEM: HOB elevation:   y            PROBLEM: Stress ulcer proph:    na                      PROBLEM: VTE prophylaxis:      Lvnx 40 (3/11)   PROBLEM: Glycemic control:    iss (3/11)   PROBLEM: Nutrition:    npo (3/11)   PROBLEM: Advanced directive: na     PROBLEM: Allergies:  na    [facility]      DVT P   Lvnx 40 (3/11)   ANTIBIO   azithro (3/11)   DM   iss (3/11)   COPD   Albuterol.6 (3/11)   Symbicort (3/11)   Solumed 40.2 (3/11)   spiriva (3/11)   CAD   Losartan 25 (3/11)   ASA 81 (3/11)   Plavix 75 (3/11)  Metoprolol 12.5x2 (3/11)   BPH  Tamsulosin .4 (3/11)     PATIENT DESCRIPTION PATIENT PATRIC TURNER 3/11/2019 ADMISSION [facility] P HOSPITALIST       · Chief Complaint: The patient is a 78y Male complaining of difficulty breathing.  · HPI Objective Statement: 78 male presents to ER by ambulance from home with report of shortness of breath, received combivent neb x 2. Patient states last night he felt worsening of shortness of breath, wheezing, chest tightness, used his oxygen, nebulizer and BIPAP with no relief.  · Presenting Symptoms: SHORTNESS OF BREATH, WHEEZING  · Negative Findings: no fever, no hemoptysis  · Timing: worsening  · Duration: yesterday  · Quality: alteration in breathing pattern  · Severity: PAIN SCALE 8 OF 10.  · Context: worse at night  · Recent Exposure To: none known  · Aggravated Factors: coughing  · Relieving Factors: none    PAST MEDICAL/SURGICAL/FAMILY/SOCIAL HISTORY:    Past Medical History:  CAD (Coronary Artery Disease)    COPD (chronic obstructive pulmonary disease)    Diabetes Mellitus, Type II    Dyslipidemia    Hypertension    Osteomyelitis    PVD (peripheral vascular disease).     Tobacco Usage:  · Tobacco Usage Former smoker    ALLERGIES AND HOME MEDICATIONS:   Allergies:        Allergies:  No Known Allergies:        Intolerances:  shellfish: Food, Nausea, feels nauseous when eating crabs or shrimp but no true allergic reaction    Home Medications:   * Patient Currently Takes Medications as of 26-Feb-2019 16:05 documented in Structured Notes  · predniSONE 10 mg oral tablet: 1 tab(s) orally once a day   · Acidophilus oral capsule: 1 cap(s) orally 3 times a day (with meals) while taking Azithromycin   · azithromycin 500 mg oral tablet: 1 tab(s) orally every 24 hours for 4 days  · valsartan 80 mg oral tablet: 1 tab(s) orally once a day  · Multiple Vitamins oral tablet: 1 tab(s) orally once a day  · metoprolol tartrate 25 mg oral tablet: 0.5 tab(s) orally 2 times a day  · clopidogrel 75 mg oral tablet: 1 tab(s) orally once a day  · aspirin 81 mg oral delayed release tablet: 1 tab(s) orally once a day  · atorvastatin 40 mg oral tablet: 1 tab(s) orally once a day  · glipiZIDE 2.5 mg oral tablet, extended release: 1 tab(s) orally 2 times a day  · tamsulosin 0.4 mg oral capsule: 1 cap(s) orally once a day (at bedtime)  · Daliresp 500 mcg oral tablet: 1 tab(s) orally once a day  · metFORMIN 1000 mg oral tablet: 1 tab(s) orally 2 times a day    REVIEW OF SYSTEMS:    Review of Systems:  · RESPIRATORY: - - -  · Respiratory [+]: EXERTIONAL DYSPNEA, SHORTNESS OF BREATH  · ROS STATEMENT: all other ROS negative except as per HPI    VITAL SIGNS( Pullset):    ,,ED ADULT Flow Sheet:    11-Mar-2019 07:44  · Temp (F): 98.2  · Temp (C) Temp (C): 36.8  · Temp site Temp Site: oral  · Heart Rate Heart Rate (beats/min): 120  · BP Systolic Systolic: 150  · BP Diastolic Diastolic (mm Hg): 84  · Respiration Rate (breaths/min) Respiration Rate (breaths/min): 18  · SpO2 (%) SpO2 (%): 97  · O2 delivery Patient On: supplemental O2  · How was the weight captured? Weight Type/Method: stated  · Dosing Weight (KILOGRAMS) Dosing Weight (KILOGRAMS): 113.4  · Dosing Weight  (POUNDS) Dosing Weight (POUNDS): 250  · Height type Height Type: stated  · Height (FEET) Height (FEET): 5  · Height (INCHES) Height (INCHES): 10  · Height (CENTIMETERS) Height (CENTIMETERS): 177.8  · BSA (m2): 2.29  · BMI (kG/m2) BMI (kG/m2): 35.9  · Presence of Pain: denies pain/discomfort  · Pain Rating (0-10): Rest: 0  · Pain Rating (0-10): Activity: 0  · SpO2 (%) SpO2 (%): 97  · O2 delivery Patient On: supplemental O2  · Preferred Language to Address Healthcare Preferred Language to Address Healthcare: English           IMPORTANT POINTS IN PATIENT COMMPHUC TURNER 3/11/2019 ADMISSION [facility] P HOSPITALIST       78 m with HO advanced COPD recurrent hospital admissions GOLD stage D admitted 3/11/2019 with COPD ex ac on chr combined resp failure   PMH HTN, DM2 (on home Metformin and glipizide), COPD (2L home/ BIPAP at night), CAD with 3v CABG 2004, HLD, 10/26/2018 ECHO ef 55% hx hypercapnic resp failure with recent intubation c/b with cardiac arrest (12/2018)         ASSESSMENT RECOMMENDATION PATIENT COMMODJACOB TURNER 3/11/2019 ADMISSION [facility] P HOSPITALIST     ACUTE COMBINED RESP FAILURE   3/11/2019 130 p 18/8/.30 729/56/101  3/11/2019 11a bpap 14/6/.35 721/73/139  3/11/2019 8a 6l 724/71/138  Improving on niv  Monitor abg po  COPD EX   On BD steroids   RESP TRACT INFECTION     3/11/2019 W 12.3  Azithro for antiinflamm effect Check pc   RO DVT  3/11/2019 V duplex legs n   On dvt p   RO MI   hx CABGx3, 10/26/2018 ECHO ef 55%    12/20/2017 ECHO  n lvsf diast dysfn ef 55%  3/11/2019 Tr 1 n  No active ischemia          PLAN SYNOPSIS PATIENT COMMODJACOB TURNER 3/11/2019 ADMISSION [facility] P HOSPITALIST  78 m with HO advanced COPD recurrent hospital admissions GOLD stage D admitted 3/11/2019 with COPD ex ac on chr combined resp failure   AC COMBINED RESP FAILURE Responding to niv Monitor abg po  COPD EX On BS steroids     TIME SPENT Over 55 minutes aggregate care time spent on encounter; activities included   direct patient care, counseling and/or coordinating care reviewing notes, lab data/ imaging , discussion with multidisciplinary team/ patient  /family. Risks, benefits, alternatives  discussed in detail.

## 2019-03-11 NOTE — CHART NOTE - NSCHARTNOTEFT_GEN_A_CORE
11 a abg noted  dw Dr Marcelino He will accept pt only if repeat abg in 2 h remains bad  Increased to bpap 18/8 fio2 .3 Called Flora RT

## 2019-03-11 NOTE — ED ADULT NURSE NOTE - EXTENSIONS OF SELF_ADULT
ANGELINE WANG  : 1966 13:29:43  ACCOUNT:  [id number]  HOME PHONE:  [phone]  WORK PHONE:  [phone]    Phone conversation: Spoke with pt regarding normal TSH. Vitamin D 24, instructed pt to start Vitamin D 2000 units daily.   Palpitations improved, but not completely resolved. Pt continues with medications.   Hold on Holter and f/u OV at this time.   Azucena DEGROOT    
None

## 2019-03-11 NOTE — H&P ADULT - PROBLEM SELECTOR PLAN 2
- hold home PO meds   - started on insulin coverage scale.   - accuck q6 while NPO. will advance diet when respiratory status improves.   - hypoglycemia protocol

## 2019-03-12 ENCOUNTER — TRANSCRIPTION ENCOUNTER (OUTPATIENT)
Age: 79
End: 2019-03-12

## 2019-03-12 LAB
ALBUMIN SERPL ELPH-MCNC: 2.9 G/DL — LOW (ref 3.3–5)
ALP SERPL-CCNC: 70 U/L — SIGNIFICANT CHANGE UP (ref 40–120)
ALT FLD-CCNC: 25 U/L — SIGNIFICANT CHANGE UP (ref 12–78)
ANION GAP SERPL CALC-SCNC: 7 MMOL/L — SIGNIFICANT CHANGE UP (ref 5–17)
AST SERPL-CCNC: 9 U/L — LOW (ref 15–37)
BILIRUB SERPL-MCNC: 0.3 MG/DL — SIGNIFICANT CHANGE UP (ref 0.2–1.2)
BUN SERPL-MCNC: 10 MG/DL — SIGNIFICANT CHANGE UP (ref 7–23)
CALCIUM SERPL-MCNC: 9 MG/DL — SIGNIFICANT CHANGE UP (ref 8.5–10.1)
CHLORIDE SERPL-SCNC: 104 MMOL/L — SIGNIFICANT CHANGE UP (ref 96–108)
CO2 SERPL-SCNC: 30 MMOL/L — SIGNIFICANT CHANGE UP (ref 22–31)
CREAT SERPL-MCNC: 0.9 MG/DL — SIGNIFICANT CHANGE UP (ref 0.5–1.3)
CULTURE RESULTS: NO GROWTH — SIGNIFICANT CHANGE UP
GLUCOSE SERPL-MCNC: 212 MG/DL — HIGH (ref 70–99)
HCT VFR BLD CALC: 36.1 % — LOW (ref 39–50)
HGB BLD-MCNC: 11.2 G/DL — LOW (ref 13–17)
INR BLD: 1.03 RATIO — SIGNIFICANT CHANGE UP (ref 0.88–1.16)
LEGIONELLA AG UR QL: NEGATIVE — SIGNIFICANT CHANGE UP
MCHC RBC-ENTMCNC: 27.9 PG — SIGNIFICANT CHANGE UP (ref 27–34)
MCHC RBC-ENTMCNC: 31 GM/DL — LOW (ref 32–36)
MCV RBC AUTO: 89.8 FL — SIGNIFICANT CHANGE UP (ref 80–100)
NRBC # BLD: 0 /100 WBCS — SIGNIFICANT CHANGE UP (ref 0–0)
PHOSPHATE SERPL-MCNC: 2.6 MG/DL — SIGNIFICANT CHANGE UP (ref 2.5–4.5)
PLATELET # BLD AUTO: 215 K/UL — SIGNIFICANT CHANGE UP (ref 150–400)
POTASSIUM SERPL-MCNC: 4.5 MMOL/L — SIGNIFICANT CHANGE UP (ref 3.5–5.3)
POTASSIUM SERPL-SCNC: 4.5 MMOL/L — SIGNIFICANT CHANGE UP (ref 3.5–5.3)
PROCALCITONIN SERPL-MCNC: <0.05 NG/ML — SIGNIFICANT CHANGE UP (ref 0–0.04)
PROT SERPL-MCNC: 5.5 G/DL — LOW (ref 6–8.3)
PROTHROM AB SERPL-ACNC: 11.8 SEC — SIGNIFICANT CHANGE UP (ref 10–12.9)
RBC # BLD: 4.02 M/UL — LOW (ref 4.2–5.8)
RBC # FLD: 13.1 % — SIGNIFICANT CHANGE UP (ref 10.3–14.5)
SODIUM SERPL-SCNC: 141 MMOL/L — SIGNIFICANT CHANGE UP (ref 135–145)
SPECIMEN SOURCE: SIGNIFICANT CHANGE UP
TROPONIN I SERPL-MCNC: <.015 NG/ML — SIGNIFICANT CHANGE UP (ref 0.01–0.04)
WBC # BLD: 9.91 K/UL — SIGNIFICANT CHANGE UP (ref 3.8–10.5)
WBC # FLD AUTO: 9.91 K/UL — SIGNIFICANT CHANGE UP (ref 3.8–10.5)

## 2019-03-12 PROCEDURE — 99233 SBSQ HOSP IP/OBS HIGH 50: CPT | Mod: GC

## 2019-03-12 PROCEDURE — 93306 TTE W/DOPPLER COMPLETE: CPT | Mod: 26

## 2019-03-12 RX ORDER — INSULIN LISPRO 100/ML
VIAL (ML) SUBCUTANEOUS
Qty: 0 | Refills: 0 | Status: DISCONTINUED | OUTPATIENT
Start: 2019-03-12 | End: 2019-03-13

## 2019-03-12 RX ADMIN — LOSARTAN POTASSIUM 25 MILLIGRAM(S): 100 TABLET, FILM COATED ORAL at 05:47

## 2019-03-12 RX ADMIN — Medication 12.5 MILLIGRAM(S): at 05:47

## 2019-03-12 RX ADMIN — ALBUTEROL 2.5 MILLIGRAM(S): 90 AEROSOL, METERED ORAL at 20:41

## 2019-03-12 RX ADMIN — ALBUTEROL 2.5 MILLIGRAM(S): 90 AEROSOL, METERED ORAL at 07:44

## 2019-03-12 RX ADMIN — ALBUTEROL 2.5 MILLIGRAM(S): 90 AEROSOL, METERED ORAL at 12:14

## 2019-03-12 RX ADMIN — ALBUTEROL 2.5 MILLIGRAM(S): 90 AEROSOL, METERED ORAL at 15:55

## 2019-03-12 RX ADMIN — TIOTROPIUM BROMIDE 1 CAPSULE(S): 18 CAPSULE ORAL; RESPIRATORY (INHALATION) at 05:47

## 2019-03-12 RX ADMIN — Medication 81 MILLIGRAM(S): at 12:34

## 2019-03-12 RX ADMIN — Medication 3: at 01:13

## 2019-03-12 RX ADMIN — AZITHROMYCIN 255 MILLIGRAM(S): 500 TABLET, FILM COATED ORAL at 10:29

## 2019-03-12 RX ADMIN — ATORVASTATIN CALCIUM 40 MILLIGRAM(S): 80 TABLET, FILM COATED ORAL at 21:05

## 2019-03-12 RX ADMIN — SODIUM CHLORIDE 50 MILLILITER(S): 9 INJECTION, SOLUTION INTRAVENOUS at 05:51

## 2019-03-12 RX ADMIN — ALBUTEROL 2.5 MILLIGRAM(S): 90 AEROSOL, METERED ORAL at 02:33

## 2019-03-12 RX ADMIN — Medication 1: at 21:48

## 2019-03-12 RX ADMIN — Medication 40 MILLIGRAM(S): at 05:47

## 2019-03-12 RX ADMIN — Medication 2: at 17:31

## 2019-03-12 RX ADMIN — BUDESONIDE AND FORMOTEROL FUMARATE DIHYDRATE 2 PUFF(S): 160; 4.5 AEROSOL RESPIRATORY (INHALATION) at 05:47

## 2019-03-12 RX ADMIN — Medication 0.5 MILLIGRAM(S): at 20:41

## 2019-03-12 RX ADMIN — CLOPIDOGREL BISULFATE 75 MILLIGRAM(S): 75 TABLET, FILM COATED ORAL at 12:34

## 2019-03-12 RX ADMIN — Medication 1 TABLET(S): at 12:34

## 2019-03-12 RX ADMIN — Medication 0.5 MILLIGRAM(S): at 07:45

## 2019-03-12 RX ADMIN — Medication 4: at 12:34

## 2019-03-12 RX ADMIN — ALBUTEROL 2.5 MILLIGRAM(S): 90 AEROSOL, METERED ORAL at 00:05

## 2019-03-12 RX ADMIN — Medication 2: at 05:54

## 2019-03-12 RX ADMIN — TAMSULOSIN HYDROCHLORIDE 0.4 MILLIGRAM(S): 0.4 CAPSULE ORAL at 21:05

## 2019-03-12 RX ADMIN — BUDESONIDE AND FORMOTEROL FUMARATE DIHYDRATE 2 PUFF(S): 160; 4.5 AEROSOL RESPIRATORY (INHALATION) at 18:19

## 2019-03-12 RX ADMIN — ENOXAPARIN SODIUM 40 MILLIGRAM(S): 100 INJECTION SUBCUTANEOUS at 12:36

## 2019-03-12 RX ADMIN — Medication 12.5 MILLIGRAM(S): at 17:32

## 2019-03-12 RX ADMIN — SODIUM CHLORIDE 50 MILLILITER(S): 9 INJECTION, SOLUTION INTRAVENOUS at 13:41

## 2019-03-12 NOTE — DISCHARGE NOTE PROVIDER - CARE PROVIDER_API CALL
Sarah Avila)  [place] [address]  Phone: [phone]  Fax: [phone]  Follow Up Time: 1 week    Dr. Rod,   Cardiologist  Phone: (   )    -  Fax: (   )    -  Follow Up Time:     Arun Dejesus)  Critical Care Medicine; Internal Medicine; Pulmonary Disease  [address] [place] [address]  Phone: [phone]  Fax: [phone]  Follow Up Time: 1 week

## 2019-03-12 NOTE — PROGRESS NOTE ADULT - SUBJECTIVE AND OBJECTIVE BOX
COMMODORE YUE [facility] P    ALLERGY Shellfish  CONTACT Sp Amira C    REASON FOR VISIT Subsequent pulm fu   Initial evaluation/Pulmonary consultation requested  3/11/2019 from Dr Dejesus by Dr Alston    Patient examined chart reviewed  HOSPITAL ADMISSION [facility] HOSPITALIST 3/11/2019  PATIENT CAME  FROM (if information available)      VITALS/LABS COMMODALAN TURNER     3/12/2019 afeb 100 113/75 19 100%  3/12/2019 bpap 18/8/.3    3/12/2019 W 9.9 Hb 11.2 Plt 215  Na 141 K 4.5 co2 30 cR .9     GLOBAL ISSUE/BEST PRACTICE:      PROBLEM: HOB elevation:   y            PROBLEM: Stress ulcer proph:    na                      PROBLEM: VTE prophylaxis:      Lvnx 40 (3/11)   PROBLEM: Glycemic control:    iss (3/11)   PROBLEM: Nutrition:    npo (3/11) ch Dash (3/12)   PROBLEM: Advanced directive: na     PROBLEM: Allergies:  shellfish    MEDICATIONS COMMODORE YUE      DVT P   Lvnx 40 (3/11)   ANTIBIO   azithro (3/11)   DM   iss (3/11)   COPD   Albuterol.6 (3/11)   Symbicort (3/11)   Solumed 40.2 (3/11) ch Solumed 40 (3/12)   spiriva (3/11)   CAD   Losartan 25 (3/11)   ASA 81 (3/11)   Plavix 75 (3/11)  Metoprolol 12.5x2 (3/11)   BPH  Tamsulosin .4 (3/11)     AC COMBINED RESP FAILURE   3/119p 18/8/.3 731/58/115   3/11/2019 130 p 18/8/.30 729/56/101  3/11/2019 11a bpap 14/6/.35 721/73/139  3/11/2019 8a 6l 724/71/138  3/11/2019 cxr copd ch  3/11/2019 V duplex legs n     ASSESSMENT RECOMMENDATION PATIENT COMMODRE YUE 3/11/2019 ADMISSION [facility] P HOSPITALIST     ACUTE COMBINED RESP FAILURE   3/119p 18/8/.3 731/58/115   Improving on niv  Monitor abg po  COPD EX   On BD steroids   RESP TRACT INFECTION     3/11/2019 W 12.3  Azithro for antiinflamm effect even through oc was n   RO DVT  3/11/2019 V duplex legs n   On dvt p   RO MI   hx CABGx3, 10/26/2018 ECHO ef 55%    12/20/2017 ECHO  n lvsf diast dysfn ef 55%  3/11-3/12/2019 Tr 1 n.2  No active ischemia          PLAN SYNOPSIS PATIENT COMMODJACOB TURNER 3/11/2019 ADMISSION [facility] P HOSPITALIST  78 m with HO advanced COPD recurrent hospital admissions GOLD stage D admitted 3/11/2019 with COPD ex ac on chr combined resp failure   AC COMBINED RESP FAILURE Responding to niv Monitor abg po Improving  COPD EX On BS steroids Improving    TIME SPENT Over 25 minutes aggregate care time spent on encounter; activities included   direct patient care, counseling and/or coordinating care reviewing notes, lab data/ imaging , discussion with multidisciplinary team/ patient  /family. Risks, benefits, alternatives  discussed in detail.

## 2019-03-12 NOTE — DISCHARGE NOTE PROVIDER - PROVIDER TOKENS
PROVIDER:[TOKEN:[[id number]],FOLLOWUP:[1 week]],FREE:[LAST:[Dr. Rod],PHONE:[(   )    -],FAX:[(   )    -],ADDRESS:[Cardiologist]],PROVIDER:[TOKEN:[[id number]],FOLLOWUP:[1 week]]

## 2019-03-12 NOTE — PROGRESS NOTE ADULT - SUBJECTIVE AND OBJECTIVE BOX
Patient is a 78y old  Male who presents with a chief complaint of SOB (12 Mar 2019 10:14)      FROM ADMISSION H+P:   HPI:  79 yo male with PMHx of HTN, DM2 (on home Metformin and glipizide), COPD (2L home/ BIPAP at night), CAD with 3v CABG , HLD, hx hypercapnic resp failure with recent intubation c/b with cardiac arrest (2018), presenting to ED with SOB . HIstory obtained from patients wife at bedside. Wife states that patient has history of copd on home o2 and bipap as needed. He has had multiple hospitalizations in the past year. The past couple of days pt stated he was feeling tired. She checked his blood pressure and his oxygen and states they were normal. He said he was going to rest and he slept yesterday during the day without his bipap. Later in the evening when he still wasnt feeling well he put his bipap on. He was still sating well - approx 93%- but felt he had increased work of breathing. Wife planned on driving him to the ED but when he tried to ambulate his breathing became extremely labored so she called EMS. Of note patient completed course of antibiotics and steroids back in february and was doing well until yesterday.     in the ED CBC, CMP, ABG, Blood cultures, mg, lactate, coags and cardiac enzymes were drawn. Significant for WBC 12.33, glucose 201, CKMB 4.1- ABG - 7.24/71/138/24. EKG revealed sinus tachycardia.  Pt was given zithromax x 1, NS bolus x 1, solumdrol 125 x1, albuterol neb and placed on BIPAP. CXR and LE dopplers pending. (11 Mar 2019 10:08)      ----  INTERVAL HPI/OVERNIGHT EVENTS: Pt seen and evaluated at the bedside. No acute overnight events occurred.     ----  PAST MEDICAL & SURGICAL HISTORY:  PVD (peripheral vascular disease)  Hypertension  COPD (chronic obstructive pulmonary disease)  Osteomyelitis  Dyslipidemia  CAD (Coronary Artery Disease)  Diabetes Mellitus, Type II  Compound fracture: left leg  CABG (Coronary Artery Bypass Graft)      FAMILY HISTORY:  Family history of diabetes mellitus (Sibling)      ----  MEDICATIONS  (STANDING):  ALBUTerol    0.083% 2.5 milliGRAM(s) Nebulizer every 4 hours  aspirin enteric coated 81 milliGRAM(s) Oral daily  atorvastatin 40 milliGRAM(s) Oral at bedtime  azithromycin  IVPB 500 milliGRAM(s) IV Intermittent every 24 hours  azithromycin  IVPB      buDESOnide   0.5 milliGRAM(s) Respule 0.5 milliGRAM(s) Inhalation every 12 hours  buDESOnide 160 MICROgram(s)/formoterol 4.5 MICROgram(s) Inhaler 2 Puff(s) Inhalation two times a day  clopidogrel Tablet 75 milliGRAM(s) Oral daily  dextrose 5% + sodium chloride 0.45%. 1000 milliLiter(s) (50 mL/Hr) IV Continuous <Continuous>  dextrose 5%. 1000 milliLiter(s) (50 mL/Hr) IV Continuous <Continuous>  dextrose 50% Injectable 12.5 Gram(s) IV Push once  dextrose 50% Injectable 25 Gram(s) IV Push once  dextrose 50% Injectable 25 Gram(s) IV Push once  enoxaparin Injectable 40 milliGRAM(s) SubCutaneous daily  insulin lispro (HumaLOG) corrective regimen sliding scale   SubCutaneous every 6 hours  losartan 25 milliGRAM(s) Oral daily  methylPREDNISolone sodium succinate Injectable 40 milliGRAM(s) IV Push every 12 hours  metoprolol tartrate 12.5 milliGRAM(s) Oral two times a day  multivitamin 1 Tablet(s) Oral daily  tamsulosin 0.4 milliGRAM(s) Oral at bedtime  tiotropium 18 MICROgram(s) Capsule 1 Capsule(s) Inhalation daily    MEDICATIONS  (PRN):  dextrose 40% Gel 15 Gram(s) Oral once PRN Blood Glucose LESS THAN 70 milliGRAM(s)/deciliter  glucagon  Injectable 1 milliGRAM(s) IntraMuscular once PRN Glucose LESS THAN 70 milligrams/deciliter      ----  REVIEW OF SYSTEMS:  CONSTITUTIONAL: denies fever, chills, fatigue, weakness  HEENT: denies blurred vision, sore throat  SKIN: denies new lesions, rash  CARDIOVASCULAR: denies chest pain, chest pressure, palpitations  RESPIRATORY: denies shortness of breath, sputum production  GASTROINTESTINAL: denies nausea, vomiting, diarrhea, abdominal pain  GENITOURINARY: denies dysuria, discharge  NEUROLOGICAL: denies numbness, headache, focal weakness  MUSCULOSKELETAL: denies new joint pain, muscle aches  HEMATOLOGIC: denies gross bleeding, bruising  LYMPHATICS: denies enlarged lymph nodes, extremity swelling  PSYCHIATRIC: denies recent changes in anxiety, depression  ENDOCRINOLOGIC: denies sweating, cold or heat intolerance    ----  PHYSICAL EXAM:  GENERAL: no acute distress, on bipap  EYES: sclera clear, no exudates, PERRL  ENMT: oropharynx clear without erythema, no exudates, moist mucous membranes, no stridor  NECK: supple, soft, no thyromegaly noted  LUNGS: coarse breath sounds throughout, symmetric breath sounds  HEART: soft S1/S2, regular rate and rhythm, no murmurs noted, no lower extremity edema  GASTROINTESTINAL: abdomen is soft, nontender, nondistended, normoactive bowel sounds, no palpable masses  INTEGUMENT: warm, dry, normal color  MUSCULOSKELETAL: no clubbing or cyanosis, no obvious deformity  NEUROLOGIC: awake, alert, oriented x3, good muscle tone in 4 extremities, no obvious sensory deficits      T(C): 36.3 (19 @ 08:23), Max: 36.9 (19 @ 14:32)  HR: 89 (19 @ 08:23) (74 - 121)  BP: 104/67 (19 @ 08:23) (100/61 - 136/83)  RR: 18 (19 @ 08:23) (18 - 20)  SpO2: 100% (19 @ 08:23) (96% - 100%)  Wt(kg): --    ----  I&O's Summary    11 Mar 2019 07:01  -  12 Mar 2019 07:00  --------------------------------------------------------  IN: 700 mL / OUT: 950 mL / NET: -250 mL        LABS:                        11.2   9.91  )-----------( 215      ( 12 Mar 2019 07:10 )             36.1     03-12    141  |  104  |  10  ----------------------------<  212<H>  4.5   |  30  |  0.90    Ca    9.0      12 Mar 2019 07:10  Phos  2.6     03-12  Mg     1.6     03-11    TPro  5.5<L>  /  Alb  2.9<L>  /  TBili  0.3  /  DBili  x   /  AST  9<L>  /  ALT  25  /  AlkPhos  70  -12    PT/INR - ( 12 Mar 2019 07:10 )   PT: 11.8 sec;   INR: 1.03 ratio         PTT - ( 11 Mar 2019 08:07 )  PTT:36.2 sec  Urinalysis Basic - ( 11 Mar 2019 11:48 )    Color: Yellow / Appearance: Clear / S.025 / pH: x  Gluc: x / Ketone: Small  / Bili: Negative / Urobili: Negative   Blood: x / Protein: 75 mg/dL / Nitrite: Negative   Leuk Esterase: Negative / RBC: 0-2 /HPF / WBC 0-2   Sq Epi: x / Non Sq Epi: Negative / Bacteria: Occasional      CAPILLARY BLOOD GLUCOSE      POCT Blood Glucose.: 226 mg/dL (12 Mar 2019 05:50)  POCT Blood Glucose.: 297 mg/dL (12 Mar 2019 01:11)  POCT Blood Glucose.: 284 mg/dL (11 Mar 2019 21:38)  POCT Blood Glucose.: 277 mg/dL (11 Mar 2019 16:26)  POCT Blood Glucose.: 263 mg/dL (11 Mar 2019 11:18)    ABG - ( 11 Mar 2019 20:24 )  pH, Arterial: 7.31  pH, Blood: x     /  pCO2: 58    /  pO2: 115   / HCO3: 26    / Base Excess: 2.9   /  SaO2: 98                          ----  Personally reviewed:  Vital sign trends: [  x] yes    [  ] no     [  ] n/a  Laboratory results: [x  ] yes    [  ] no     [  ] n/a  Radiology results: [x  ] yes    [  ] no     [  ] n/a  Culture results: [  ] yes    [  ] no     [  ] n/a  Consultant recommendations: [x  ] yes    [  ] no     [  ] n/a

## 2019-03-12 NOTE — DISCHARGE NOTE PROVIDER - NSDCFUADDINST_GEN_ALL_CORE_FT
Please follow up with your primary care doctor within one week of discharge  Please follow up with your pulmonologist upon discharge  If you begin to experience worsening of your symptoms please seek medical attention

## 2019-03-12 NOTE — DISCHARGE NOTE PROVIDER - HOSPITAL COURSE
FROM ADMISSION H+P:     HPI:    79 yo male with PMHx of HTN, DM2 (on home Metformin and glipizide), COPD (2L home/ BIPAP at night), CAD with 3v CABG 2004, HLD, hx hypercapnic resp failure with recent intubation c/b with cardiac arrest (6/2018), presenting to ED with SOB . HIstory obtained from patients wife at bedside. Wife states that patient has history of copd on home o2 and bipap as needed. He has had multiple hospitalizations in the past year. The past couple of days pt stated he was feeling tired. She checked his blood pressure and his oxygen and states they were normal. He said he was going to rest and he slept yesterday during the day without his bipap. Later in the evening when he still wasnt feeling well he put his bipap on. He was still sating well - approx 93%- but felt he had increased work of breathing. Wife planned on driving him to the ED but when he tried to ambulate his breathing became extremely labored so she called EMS. Of note patient completed course of antibiotics and steroids back in february and was doing well until yesterday.         in the ED CBC, CMP, ABG, Blood cultures, mg, lactate, coags and cardiac enzymes were drawn. Significant for WBC 12.33, glucose 201, CKMB 4.1- ABG - 7.24/71/138/24. EKG revealed sinus tachycardia.    Pt was given zithromax x 1, NS bolus x 1, solumdrol 125 x1, albuterol neb and placed on BIPAP. CXR and LE dopplers were negative. (11 Mar 2019 10:08) Pulmonology was consulted for guidance of course.            ---    HOSPITAL COURSE:     Pt was admitted to [facility] for acute on chronic hypercapnic respiratory failure 2/2 COPD exacerbation. Pt was put on BIPAP 18/8/30%. Pt was continued on Zitromax for 5 days, and solumedrol. Pt symptoms began to improve and he was able to be transitioned over to NC during the day. Blood and urine cx showed no growth. In order to r/o a cardiac nature a TTE was performed which revealed:*****    ---    CONSULTANTS:     Pulm: Dr. Dejesus    ---    TIME SPENT:    The total amount of time spent reviewing the hospital notes, laboratory values, imaging findings, assessing/counseling the patient, discussing with consultant physicians, social work, nursing staff took >45 minutes        ---    FINAL DISCHARGE DIAGNOSIS LIST:    Please see last daily progress note for final discharge diagnoses        ---    Primary care provider was made aware of plan for discharge:      [  ] NO     [  ] YES FROM ADMISSION H+P:     HPI:    79 yo male with PMHx of HTN, DM2 (on home Metformin and glipizide), COPD (2L home/ BIPAP at night), CAD with 3v CABG 2004, HLD, hx hypercapnic resp failure with recent intubation c/b with cardiac arrest (6/2018), presenting to ED with SOB . HIstory obtained from patients wife at bedside. Wife states that patient has history of copd on home o2 and bipap as needed. He has had multiple hospitalizations in the past year. The past couple of days pt stated he was feeling tired. She checked his blood pressure and his oxygen and states they were normal. He said he was going to rest and he slept yesterday during the day without his bipap. Later in the evening when he still wasnt feeling well he put his bipap on. He was still sating well - approx 93%- but felt he had increased work of breathing. Wife planned on driving him to the ED but when he tried to ambulate his breathing became extremely labored so she called EMS. Of note patient completed course of antibiotics and steroids back in february and was doing well until yesterday.         in the ED CBC, CMP, ABG, Blood cultures, mg, lactate, coags and cardiac enzymes were drawn. Significant for WBC 12.33, glucose 201, CKMB 4.1- ABG - 7.24/71/138/24. EKG revealed sinus tachycardia.    Pt was given zithromax x 1, NS bolus x 1, solumdrol 125 x1, albuterol neb and placed on BIPAP. CXR and LE dopplers were negative. (11 Mar 2019 10:08) Pulmonology was consulted for guidance of course.            ---    HOSPITAL COURSE:     Pt was admitted to [facility] for acute on chronic hypercapnic respiratory failure 2/2 COPD exacerbation. Pt was put on BIPAP 18/8/30%. Pt was continued on Zitromax for 5 days, and solumedrol. Pt symptoms began to improve and he was able to be transitioned over to NC during the day. Blood and urine cx showed no growth, urine legionella was negative. In order to r/o a cardiac nature a TTE was performed which revealed:*****    ---    CONSULTANTS:     Pulm: Dr. Dejesus    ---    TIME SPENT:    The total amount of time spent reviewing the hospital notes, laboratory values, imaging findings, assessing/counseling the patient, discussing with consultant physicians, social work, nursing staff took >45 minutes        ---    FINAL DISCHARGE DIAGNOSIS LIST:    Please see last daily progress note for final discharge diagnoses        ---    Primary care provider was made aware of plan for discharge:      [  ] NO     [  ] YES FROM ADMISSION H+P:     HPI:    79 yo male with PMHx of HTN, DM2 (on home Metformin and glipizide), COPD (2L home/ BIPAP at night), CAD with 3v CABG 2004, HLD, hx hypercapnic resp failure with recent intubation c/b with cardiac arrest (6/2018), presenting to ED with SOB . HIstory obtained from patients wife at bedside. Wife states that patient has history of copd on home o2 and bipap as needed. He has had multiple hospitalizations in the past year. The past couple of days pt stated he was feeling tired. She checked his blood pressure and his oxygen and states they were normal. He said he was going to rest and he slept yesterday during the day without his bipap. Later in the evening when he still wasnt feeling well he put his bipap on. He was still sating well - approx 93%- but felt he had increased work of breathing. Wife planned on driving him to the ED but when he tried to ambulate his breathing became extremely labored so she called EMS. Of note patient completed course of antibiotics and steroids back in february and was doing well until yesterday.         in the ED CBC, CMP, ABG, Blood cultures, mg, lactate, coags and cardiac enzymes were drawn. Significant for WBC 12.33, glucose 201, CKMB 4.1- ABG - 7.24/71/138/24. EKG revealed sinus tachycardia.    Pt was given zithromax x 1, NS bolus x 1, solumdrol 125 x1, albuterol neb and placed on BIPAP. CXR and LE dopplers were negative. (11 Mar 2019 10:08) Pulmonology was consulted for guidance of course.            ---    HOSPITAL COURSE:     Pt was admitted to [facility] for acute on chronic hypercapnic respiratory failure 2/2 COPD exacerbation. Pt was put on BIPAP 18/8/30%. Pt was continued on Zitromax for 5 days, and solumedrol. Pt symptoms began to improve and he was able to be transitioned over to NC during the day. Pt BIPAP machine was checked during hospital stay. Blood and urine cx showed no growth, urine legionella was negative. In order to r/o a cardiac nature a TTE was performed which revealed:*****    ---    CONSULTANTS:     Pulm: Dr. Dejesus    ---    TIME SPENT:    The total amount of time spent reviewing the hospital notes, laboratory values, imaging findings, assessing/counseling the patient, discussing with consultant physicians, social work, nursing staff took >45 minutes        ---    FINAL DISCHARGE DIAGNOSIS LIST:    Please see last daily progress note for final discharge diagnoses        ---    Primary care provider was made aware of plan for discharge:      [  ] NO     [  ] YES FROM ADMISSION H+P:     HPI:    79 yo male with PMHx of HTN, DM2 (on home Metformin and glipizide), COPD (2L home/ BIPAP at night), CAD with 3v CABG 2004, HLD, hx hypercapnic resp failure with recent intubation c/b with cardiac arrest (6/2018), presenting to ED with SOB . HIstory obtained from patients wife at bedside. Wife states that patient has history of copd on home o2 and bipap as needed. He has had multiple hospitalizations in the past year. The past couple of days pt stated he was feeling tired. She checked his blood pressure and his oxygen and states they were normal. He said he was going to rest and he slept yesterday during the day without his bipap. Later in the evening when he still wasnt feeling well he put his bipap on. He was still sating well - approx 93%- but felt he had increased work of breathing. Wife planned on driving him to the ED but when he tried to ambulate his breathing became extremely labored so she called EMS. Of note patient completed course of antibiotics and steroids back in february and was doing well until yesterday.         in the ED CBC, CMP, ABG, Blood cultures, mg, lactate, coags and cardiac enzymes were drawn. Significant for WBC 12.33, glucose 201, CKMB 4.1- ABG - 7.24/71/138/24. EKG revealed sinus tachycardia.    Pt was given zithromax x 1, NS bolus x 1, solumdrol 125 x1, albuterol neb and placed on BIPAP. CXR and LE dopplers were negative. (11 Mar 2019 10:08) Pulmonology was consulted for guidance of course.            ---    HOSPITAL COURSE:     Pt was admitted to [facility] for acute on chronic hypercapnic respiratory failure 2/2 COPD exacerbation. Pt was put on BIPAP 18/8/30%. Pt was continued on Zitromax for 5 days, and solumedrol. Pt symptoms began to improve and he was able to be transitioned over to NC during the day. Pt BIPAP machine was checked during hospital stay. Blood and urine cx showed no growth, urine legionella was negative. In order to r/o a cardiac nature a TTE was performed which revealed:***** a1c 8    ---    CONSULTANTS:     Pulm: Dr. Dejesus    ---    TIME SPENT:    The total amount of time spent reviewing the hospital notes, laboratory values, imaging findings, assessing/counseling the patient, discussing with consultant physicians, social work, nursing staff took >45 minutes        ---    FINAL DISCHARGE DIAGNOSIS LIST:    Please see last daily progress note for final discharge diagnoses        ---    Primary care provider was made aware of plan for discharge:      [  ] NO     [  ] YES FROM ADMISSION H+P:     HPI:    77 yo male with PMHx of HTN, DM2 (on home Metformin and glipizide), COPD (2L home/ BIPAP at night), CAD with 3v CABG 2004, HLD, hx hypercapnic resp failure with recent intubation c/b with cardiac arrest (6/2018), presenting to ED with SOB . HIstory obtained from patients wife at bedside. Wife states that patient has history of copd on home o2 and bipap as needed. He has had multiple hospitalizations in the past year. The past couple of days pt stated he was feeling tired. She checked his blood pressure and his oxygen and states they were normal. He said he was going to rest and he slept yesterday during the day without his bipap. Later in the evening when he still wasnt feeling well he put his bipap on. He was still sating well - approx 93%- but felt he had increased work of breathing. Wife planned on driving him to the ED but when he tried to ambulate his breathing became extremely labored so she called EMS. Of note patient completed course of antibiotics and steroids back in february and was doing well until yesterday.         in the ED CBC, CMP, ABG, Blood cultures, mg, lactate, coags and cardiac enzymes were drawn. Significant for WBC 12.33, glucose 201, CKMB 4.1- ABG - 7.24/71/138/24. EKG revealed sinus tachycardia.    Pt was given zithromax x 1, NS bolus x 1, solumdrol 125 x1, albuterol neb and placed on BIPAP. CXR and LE dopplers were negative. (11 Mar 2019 10:08) Pulmonology was consulted for guidance of course.            ---    HOSPITAL COURSE:     Pt was admitted to [facility] for acute on chronic hypercapnic respiratory failure 2/2 COPD exacerbation. Pt was put on BIPAP 18/8/30%. Pt was continued on Zitromax for 5 days, and solumedrol. Pt symptoms began to improve and he was able to be transitioned over to NC during the day. Pt BIPAP machine was checked during hospital stay. Blood and urine cx showed no growth, urine legionella was negative. In order to r/o a cardiac nature a TTE was performed which revealed:***** a1c 8. Pt was deemed medically stable for dc home.    ---    CONSULTANTS:     Pulm: Dr. Dejesus    ---    TIME SPENT:    The total amount of time spent reviewing the hospital notes, laboratory values, imaging findings, assessing/counseling the patient, discussing with consultant physicians, social work, nursing staff took >45 minutes        ---    FINAL DISCHARGE DIAGNOSIS LIST:    Please see last daily progress note for final discharge diagnoses        ---    Primary care provider was made aware of plan for discharge:      [  ] NO     [  ] YES        On day of dc:    GENERAL: no acute distress, resting comfortably in bed on NC    EYES: sclera clear, no exudates, PERRL    ENMT: oropharynx clear without erythema, no exudates, moist mucous membranes, no stridor    NECK: supple, soft, no thyromegaly noted    LUNGS: mild expiratory wheezing, symmetric breath sounds    HEART: soft S1/S2, regular rate and rhythm, no murmurs noted, no lower extremity edema    GASTROINTESTINAL: abdomen is soft, nontender, nondistended, normoactive bowel sounds, no palpable masses    INTEGUMENT: warm, dry, normal color    MUSCULOSKELETAL: no clubbing or cyanosis, no obvious deformity    NEUROLOGIC: awake, alert, oriented x3, good muscle tone in 4 extremities, no obvious sensory deficits FROM ADMISSION H+P:     HPI:    77 yo male with PMHx of HTN, DM2 (on home Metformin and glipizide), COPD (2L home/ BIPAP at night), CAD with 3v CABG 2004, HLD, hx hypercapnic resp failure with recent intubation c/b with cardiac arrest (6/2018), presenting to ED with SOB . HIstory obtained from patients wife at bedside. Wife states that patient has history of copd on home o2 and bipap as needed. He has had multiple hospitalizations in the past year. The past couple of days pt stated he was feeling tired. She checked his blood pressure and his oxygen and states they were normal. He said he was going to rest and he slept yesterday during the day without his bipap. Later in the evening when he still wasnt feeling well he put his bipap on. He was still sating well - approx 93%- but felt he had increased work of breathing. Wife planned on driving him to the ED but when he tried to ambulate his breathing became extremely labored so she called EMS. Of note patient completed course of antibiotics and steroids back in february and was doing well until yesterday.         in the ED CBC, CMP, ABG, Blood cultures, mg, lactate, coags and cardiac enzymes were drawn. Significant for WBC 12.33, glucose 201, CKMB 4.1- ABG - 7.24/71/138/24. EKG revealed sinus tachycardia.    Pt was given zithromax x 1, NS bolus x 1, solumdrol 125 x1, albuterol neb and placed on BIPAP. CXR and LE dopplers were negative. (11 Mar 2019 10:08) Pulmonology was consulted for guidance of course.            ---    HOSPITAL COURSE:     Pt was admitted to [facility] for acute on chronic hypercapnic respiratory failure 2/2 COPD exacerbation. Pt was put on BIPAP 18/8/30%. Pt was continued on Zitromax for 5 days, and solumedrol. Pt symptoms began to improve and he was able to be transitioned over to NC during the day. Pt BIPAP machine was checked during hospital stay. Blood and urine cx showed no growth, urine legionella was negative. In order to r/o a cardiac nature a TTE was performed which revealed:***** a1c 8. Pt symptoms began to improve and he noted significant difference. He was able to breath without distress. Pt was deemed medically stable for dc home.    ---    CONSULTANTS:     Pulm: Dr. Dejesus    ---    TIME SPENT:    The total amount of time spent reviewing the hospital notes, laboratory values, imaging findings, assessing/counseling the patient, discussing with consultant physicians, social work, nursing staff took >45 minutes        ---    FINAL DISCHARGE DIAGNOSIS LIST:    Please see last daily progress note for final discharge diagnoses        ---    Primary care provider was made aware of plan for discharge:      [  ] NO     [  ] YES        On day of dc:    GENERAL: no acute distress, resting comfortably in bed on NC    EYES: sclera clear, no exudates, PERRL    ENMT: oropharynx clear without erythema, no exudates, moist mucous membranes, no stridor    NECK: supple, soft, no thyromegaly noted    LUNGS: mild expiratory wheezing, symmetric breath sounds    HEART: soft S1/S2, regular rate and rhythm, no murmurs noted, no lower extremity edema    GASTROINTESTINAL: abdomen is soft, nontender, nondistended, normoactive bowel sounds, no palpable masses    INTEGUMENT: warm, dry, normal color    MUSCULOSKELETAL: no clubbing or cyanosis, no obvious deformity    NEUROLOGIC: awake, alert, oriented x3, good muscle tone in 4 extremities, no obvious sensory deficits FROM ADMISSION H+P:     HPI:    79 yo male with PMHx of HTN, DM2 (on home Metformin and glipizide), COPD (2L home/ BIPAP at night), CAD with 3v CABG 2004, HLD, hx hypercapnic resp failure with recent intubation c/b with cardiac arrest (6/2018), presenting to ED with SOB . HIstory obtained from patients wife at bedside. Wife states that patient has history of copd on home o2 and bipap as needed. He has had multiple hospitalizations in the past year. The past couple of days pt stated he was feeling tired. She checked his blood pressure and his oxygen and states they were normal. He said he was going to rest and he slept yesterday during the day without his bipap. Later in the evening when he still wasnt feeling well he put his bipap on. He was still sating well - approx 93%- but felt he had increased work of breathing. Wife planned on driving him to the ED but when he tried to ambulate his breathing became extremely labored so she called EMS. Of note patient completed course of antibiotics and steroids back in february and was doing well until yesterday.         in the ED CBC, CMP, ABG, Blood cultures, mg, lactate, coags and cardiac enzymes were drawn. Significant for WBC 12.33, glucose 201, CKMB 4.1- ABG - 7.24/71/138/24. EKG revealed sinus tachycardia.    Pt was given zithromax x 1, NS bolus x 1, solumdrol 125 x1, albuterol neb and placed on BIPAP. CXR and LE dopplers were negative. (11 Mar 2019 10:08) Pulmonology was consulted for guidance of course.            ---    HOSPITAL COURSE:     Pt was admitted to [facility] for acute on chronic hypercapnic respiratory failure 2/2 COPD exacerbation. Pt was put on BIPAP 18/8/30%. Pt was continued on Zitromax for 5 days, and solumedrol. Pt symptoms began to improve and he was able to be transitioned over to NC during the day. Pt BIPAP machine was checked during hospital stay. Blood and urine cx showed no growth, urine legionella was negative. In order to r/o a cardiac nature a TTE was performed which did not show any signs of CHF. a1c 8. Pt symptoms began to improve and he noted significant difference. He was able to breath without distress. Pt was deemed medically stable for dc home.    ---    CONSULTANTS:     Pulm: Dr. Dejesus    ---    TIME SPENT:    The total amount of time spent reviewing the hospital notes, laboratory values, imaging findings, assessing/counseling the patient, discussing with consultant physicians, social work, nursing staff took >45 minutes        ---    FINAL DISCHARGE DIAGNOSIS LIST:    Please see last daily progress note for final discharge diagnoses        ---    Primary care provider was made aware of plan for discharge:      [  ] NO     [  ] YES        On day of dc:    GENERAL: no acute distress, resting comfortably in bed on NC    EYES: sclera clear, no exudates, PERRL    ENMT: oropharynx clear without erythema, no exudates, moist mucous membranes, no stridor    NECK: supple, soft, no thyromegaly noted    LUNGS: mild expiratory wheezing, symmetric breath sounds    HEART: soft S1/S2, regular rate and rhythm, no murmurs noted, no lower extremity edema    GASTROINTESTINAL: abdomen is soft, nontender, nondistended, normoactive bowel sounds, no palpable masses    INTEGUMENT: warm, dry, normal color    MUSCULOSKELETAL: no clubbing or cyanosis, no obvious deformity    NEUROLOGIC: awake, alert, oriented x3, good muscle tone in 4 extremities, no obvious sensory deficits

## 2019-03-12 NOTE — DISCHARGE NOTE PROVIDER - NSDCCPCAREPLAN_GEN_ALL_CORE_FT
PRINCIPAL DISCHARGE DIAGNOSIS  Problem: Respiratory failure, acute-on-chronic  Assessment and Plan of Treatment: Respiratory failure, acute-on-chronic: You were found to be in respiratory distress from a COPD exacerbation. You were placed on BIPAP. You completed a course of Zitromax and were given steroids. Please continue to use your home oxygen and BIPAP at night. Please continue to take your COPD medications as prescribed      SECONDARY DISCHARGE DIAGNOSES  Problem: COPD exacerbation  Assessment and Plan of Treatment: You were found to be in respiratory distress from a COPD exacerbation. You were placed on BIPAP. You completed a course of Zitromax and were given steroids. Please continue to use your home oxygen and BIPAP at night. Please continue to take your COPD medications as prescribed    Problem: CAD (Coronary Artery Disease)  Assessment and Plan of Treatment: CAD (Coronary Artery Disease). Please continue to take your medications as prescribed    Problem: Dyslipidemia  Assessment and Plan of Treatment: Dyslipidemia. Please continue to take your medications as prescribed    Problem: Diabetes Mellitus, Type II  Assessment and Plan of Treatment: Diabetes Mellitus, Type II. Please continue to take your medications as prescribed    Problem: Hypertension  Assessment and Plan of Treatment: Hypertension. Please continue to take your medications as prescribed    Problem: BPH (benign prostatic hyperplasia)  Assessment and Plan of Treatment: Please continue to take your medication as prescribed PRINCIPAL DISCHARGE DIAGNOSIS  Problem: Respiratory failure, acute-on-chronic  Assessment and Plan of Treatment: Respiratory failure, acute-on-chronic: You were found to be in respiratory distress from a COPD exacerbation. You were placed on BIPAP. You completed a course of Zitromax and were given steroids. Please continue to use your home oxygen and BIPAP at night. Please continue to take your COPD medications as prescribed      SECONDARY DISCHARGE DIAGNOSES  Problem: COPD exacerbation  Assessment and Plan of Treatment: You were found to be in respiratory distress from a COPD exacerbation. You were placed on BIPAP. You completed a course of Zitromax and were given steroids. Please continue to use your home oxygen and BIPAP at night. Please continue to take your COPD medications as prescribed    Problem: CAD (Coronary Artery Disease)  Assessment and Plan of Treatment: CAD (Coronary Artery Disease). Please continue to take your medications as prescribed    Problem: Dyslipidemia  Assessment and Plan of Treatment: Dyslipidemia. Please continue to take your medications as prescribed    Problem: Diabetes Mellitus, Type II  Assessment and Plan of Treatment: Diabetes Mellitus, Type II. Please continue to take your medications as prescribed. a1c 8    Problem: Hypertension  Assessment and Plan of Treatment: Hypertension. Please continue to take your medications as prescribed    Problem: BPH (benign prostatic hyperplasia)  Assessment and Plan of Treatment: Please continue to take your medication as prescribed

## 2019-03-13 LAB
ANION GAP SERPL CALC-SCNC: 3 MMOL/L — LOW (ref 5–17)
BUN SERPL-MCNC: 15 MG/DL — SIGNIFICANT CHANGE UP (ref 7–23)
CALCIUM SERPL-MCNC: 8.8 MG/DL — SIGNIFICANT CHANGE UP (ref 8.5–10.1)
CHLORIDE SERPL-SCNC: 103 MMOL/L — SIGNIFICANT CHANGE UP (ref 96–108)
CO2 SERPL-SCNC: 36 MMOL/L — HIGH (ref 22–31)
CREAT SERPL-MCNC: 1.1 MG/DL — SIGNIFICANT CHANGE UP (ref 0.5–1.3)
GLUCOSE SERPL-MCNC: 203 MG/DL — HIGH (ref 70–99)
HBA1C BLD-MCNC: 8 % — HIGH (ref 4–5.6)
HCT VFR BLD CALC: 36.1 % — LOW (ref 39–50)
HGB BLD-MCNC: 11 G/DL — LOW (ref 13–17)
MCHC RBC-ENTMCNC: 27.7 PG — SIGNIFICANT CHANGE UP (ref 27–34)
MCHC RBC-ENTMCNC: 30.5 GM/DL — LOW (ref 32–36)
MCV RBC AUTO: 90.9 FL — SIGNIFICANT CHANGE UP (ref 80–100)
NRBC # BLD: 0 /100 WBCS — SIGNIFICANT CHANGE UP (ref 0–0)
PLATELET # BLD AUTO: 214 K/UL — SIGNIFICANT CHANGE UP (ref 150–400)
POTASSIUM SERPL-MCNC: 4.1 MMOL/L — SIGNIFICANT CHANGE UP (ref 3.5–5.3)
POTASSIUM SERPL-SCNC: 4.1 MMOL/L — SIGNIFICANT CHANGE UP (ref 3.5–5.3)
RBC # BLD: 3.97 M/UL — LOW (ref 4.2–5.8)
RBC # FLD: 13.3 % — SIGNIFICANT CHANGE UP (ref 10.3–14.5)
S PNEUM AG SER QL: SIGNIFICANT CHANGE UP
SODIUM SERPL-SCNC: 142 MMOL/L — SIGNIFICANT CHANGE UP (ref 135–145)
WBC # BLD: 8.49 K/UL — SIGNIFICANT CHANGE UP (ref 3.8–10.5)
WBC # FLD AUTO: 8.49 K/UL — SIGNIFICANT CHANGE UP (ref 3.8–10.5)

## 2019-03-13 PROCEDURE — 99233 SBSQ HOSP IP/OBS HIGH 50: CPT | Mod: GC

## 2019-03-13 RX ORDER — INSULIN LISPRO 100/ML
VIAL (ML) SUBCUTANEOUS
Qty: 0 | Refills: 0 | Status: DISCONTINUED | OUTPATIENT
Start: 2019-03-13 | End: 2019-03-14

## 2019-03-13 RX ORDER — AZITHROMYCIN 500 MG/1
250 TABLET, FILM COATED ORAL DAILY
Qty: 0 | Refills: 0 | Status: DISCONTINUED | OUTPATIENT
Start: 2019-03-14 | End: 2019-03-14

## 2019-03-13 RX ADMIN — Medication 6: at 17:41

## 2019-03-13 RX ADMIN — BUDESONIDE AND FORMOTEROL FUMARATE DIHYDRATE 2 PUFF(S): 160; 4.5 AEROSOL RESPIRATORY (INHALATION) at 17:41

## 2019-03-13 RX ADMIN — TAMSULOSIN HYDROCHLORIDE 0.4 MILLIGRAM(S): 0.4 CAPSULE ORAL at 21:28

## 2019-03-13 RX ADMIN — Medication 0.5 MILLIGRAM(S): at 08:06

## 2019-03-13 RX ADMIN — Medication 81 MILLIGRAM(S): at 12:25

## 2019-03-13 RX ADMIN — Medication 12: at 12:36

## 2019-03-13 RX ADMIN — CLOPIDOGREL BISULFATE 75 MILLIGRAM(S): 75 TABLET, FILM COATED ORAL at 12:25

## 2019-03-13 RX ADMIN — ALBUTEROL 2.5 MILLIGRAM(S): 90 AEROSOL, METERED ORAL at 15:30

## 2019-03-13 RX ADMIN — AZITHROMYCIN 255 MILLIGRAM(S): 500 TABLET, FILM COATED ORAL at 10:17

## 2019-03-13 RX ADMIN — ALBUTEROL 2.5 MILLIGRAM(S): 90 AEROSOL, METERED ORAL at 19:09

## 2019-03-13 RX ADMIN — Medication 40 MILLIGRAM(S): at 05:20

## 2019-03-13 RX ADMIN — Medication 4: at 21:29

## 2019-03-13 RX ADMIN — SODIUM CHLORIDE 50 MILLILITER(S): 9 INJECTION, SOLUTION INTRAVENOUS at 21:29

## 2019-03-13 RX ADMIN — TIOTROPIUM BROMIDE 1 CAPSULE(S): 18 CAPSULE ORAL; RESPIRATORY (INHALATION) at 08:25

## 2019-03-13 RX ADMIN — ALBUTEROL 2.5 MILLIGRAM(S): 90 AEROSOL, METERED ORAL at 08:07

## 2019-03-13 RX ADMIN — Medication 1 TABLET(S): at 12:26

## 2019-03-13 RX ADMIN — Medication 0.5 MILLIGRAM(S): at 19:09

## 2019-03-13 RX ADMIN — Medication 12.5 MILLIGRAM(S): at 17:41

## 2019-03-13 RX ADMIN — ALBUTEROL 2.5 MILLIGRAM(S): 90 AEROSOL, METERED ORAL at 23:09

## 2019-03-13 RX ADMIN — ATORVASTATIN CALCIUM 40 MILLIGRAM(S): 80 TABLET, FILM COATED ORAL at 21:28

## 2019-03-13 RX ADMIN — SODIUM CHLORIDE 50 MILLILITER(S): 9 INJECTION, SOLUTION INTRAVENOUS at 10:17

## 2019-03-13 RX ADMIN — ENOXAPARIN SODIUM 40 MILLIGRAM(S): 100 INJECTION SUBCUTANEOUS at 12:25

## 2019-03-13 RX ADMIN — ALBUTEROL 2.5 MILLIGRAM(S): 90 AEROSOL, METERED ORAL at 01:30

## 2019-03-13 RX ADMIN — ALBUTEROL 2.5 MILLIGRAM(S): 90 AEROSOL, METERED ORAL at 11:13

## 2019-03-13 RX ADMIN — BUDESONIDE AND FORMOTEROL FUMARATE DIHYDRATE 2 PUFF(S): 160; 4.5 AEROSOL RESPIRATORY (INHALATION) at 08:25

## 2019-03-13 RX ADMIN — Medication 2: at 08:25

## 2019-03-13 NOTE — PROGRESS NOTE ADULT - ATTENDING COMMENTS
Agree with plan and exam as above with the following: Given 2nd readmission in past month, will repeat ECHO. Spoke to case management bipap to get rechecked. rest as above.
Agree with plan and exam as above with the following: Given 2nd readmission in past month, will repeat ECHO. Wife also informed to get bipap rechecked. rest as above.

## 2019-03-13 NOTE — PROGRESS NOTE ADULT - SUBJECTIVE AND OBJECTIVE BOX
Patient was examined Chart reviewed Detailed note will be inserted below after going over latest data Meanwhile please refer to my previous notes for Pulmonary/Critical Care assessment recommendations Patient was examined Chart reviewed Detailed note will be inserted below after going over latest data Meanwhile please refer to my previous notes for Pulmonary/Critical Care assessment recommendations         COMMODORE TURNER [facility] P    ALLERGY Shellfish  CONTACT Sp Amira C    REASON FOR VISIT Subsequent pulm fu   Initial evaluation/Pulmonary consultation requested  3/11/2019 from Dr Dejesus by Dr Alston    Patient examined chart reviewed  HOSPITAL ADMISSION [facility] P HOSPITALIST 3/11/2019  PATIENT CAME  FROM (if information available)      VITALS/LABS  YUE     3/13/2019 afeb 74 119/70 19 100%   3/13/2019 18/8/.3   3/13/2019 w 8.4 Hb 11 Plt 214 Na 142 K 4.1 CO2 36 Cr 1.1     REVIEW OF SYMPTOMS    Able to give ROS  Yes     RELIABLE No   CONSTITUTIONAL Weakness Yes  Chills No Vision changes No  ENDOCRINE No unexplained hair loss No heat or cold intolerance    ALLERGY No hives  Sore throat No   RESP Coughing blood no  Shortness of breath YES   NEURO No Headache  Confusion Pain neck No   CARDIAC No Chest pain No Palpitations   GI No Pain abdomen NO   Vomiting NO     PHYSICAL EXAM    HEENT Unremarkable PERRLA atraumatic   RESP Fair air entry EXP prolonged    Harsh breath sound Resp distres mild   CARDIAC S1 S2 No S3     NO JVD    ABDOMEN SOFT BS PRESENT NOT DISTENDED No hepatosplenomegaly PEDAL EDEMA present No calf tenderness  NO rash   GENERAL Not TOXIC looking    GLOBAL ISSUE/BEST PRACTICE:      PROBLEM: HOB elevation:   y            PROBLEM: Stress ulcer proph:    na                      PROBLEM: VTE prophylaxis:      Lvnx 40 (3/11)   PROBLEM: Glycemic control:    iss (3/11)   PROBLEM: Nutrition:    npo (3/11) ch Dash (3/12)   PROBLEM: Advanced directive: na     PROBLEM: Allergies:  shellfish    MEDICATIONS COMMODORE TURNER      DVT P   Lvnx 40 (3/11)   ANTIBIO   azithro (3/11)   DM   iss (3/11)   COPD   Albuterol.6 (3/11)   Symbicort (3/11)   Solumed 40.2 (3/11) ch Solumed 40 (3/12)   spiriva (3/11)   CAD   Losartan 25 (3/11)   ASA 81 (3/11)   Plavix 75 (3/11)  Metoprolol 12.5x2 (3/11)   BPH  Tamsulosin .4 (3/11)     ASSESSMENT RECOMMENDATION PATIENT COMMODJACOB TURNER 3/11/2019 ADMISSION [facility] P HOSPITALIST     ACUTE COMBINED RESP FAILURE   3/119p 18/8/.3 731/58/115   3/13/2019 rt at [facility] to  pt how to detach humidifier from his home machine (as it bothers him)   Improving on niv  Monitor abg po  COPD EX   On BD steroids   RESP TRACT INFECTION     3/11/2019 W 12.3  Azithro for antiinflamm effect even through oc was n   RO DVT  3/11/2019 V duplex legs n   On dvt p   RO MI   hx CABGx3, 10/26/2018 ECHO ef 55%    12/20/2017 ECHO  n lvsf diast dysfn ef 55%  3/11-3/12/2019 Tr 1 n.2  No active ischemia          PLAN SYNOPSIS PATIENT COMMODJACOB TURNER 3/11/2019 ADMISSION [facility] P HOSPITALIST  78 m with HO advanced COPD recurrent hospital admissions GOLD stage D admitted 3/11/2019 with COPD ex ac on chr combined resp failure   AC COMBINED RESP FAILURE Responding to niv Monitor abg po Improving Has niv at home 3/13/2019 rt at [facility] to  pt how to detach humidifier from his home machine (as it bothers him)   COPD EX On BS steroids Improving May change to pred 30 taper over 9 d 3/14     TIME SPENT Over 25 minutes aggregate care time spent on encounter; activities included   direct patient care, counseling and/or coordinating care reviewing notes, lab data/ imaging , discussion with multidisciplinary team/ patient  /family. Risks, benefits, alternatives  discussed in detail.

## 2019-03-13 NOTE — PROGRESS NOTE ADULT - SUBJECTIVE AND OBJECTIVE BOX
Patient is a 78y old  Male who presents with a chief complaint of SOB (12 Mar 2019 16:40)      FROM ADMISSION H+P:   HPI:  77 yo male with PMHx of HTN, DM2 (on home Metformin and glipizide), COPD (2L home/ BIPAP at night), CAD with 3v CABG , HLD, hx hypercapnic resp failure with recent intubation c/b with cardiac arrest (2018), presenting to ED with SOB . HIstory obtained from patients wife at bedside. Wife states that patient has history of copd on home o2 and bipap as needed. He has had multiple hospitalizations in the past year. The past couple of days pt stated he was feeling tired. She checked his blood pressure and his oxygen and states they were normal. He said he was going to rest and he slept yesterday during the day without his bipap. Later in the evening when he still wasnt feeling well he put his bipap on. He was still sating well - approx 93%- but felt he had increased work of breathing. Wife planned on driving him to the ED but when he tried to ambulate his breathing became extremely labored so she called EMS. Of note patient completed course of antibiotics and steroids back in february and was doing well until yesterday.     in the ED CBC, CMP, ABG, Blood cultures, mg, lactate, coags and cardiac enzymes were drawn. Significant for WBC 12.33, glucose 201, CKMB 4.1- ABG - 7.24/71/138/24. EKG revealed sinus tachycardia.  Pt was given zithromax x 1, NS bolus x 1, solumdrol 125 x1, albuterol neb and placed on BIPAP. CXR and LE dopplers pending. (11 Mar 2019 10:08)      ----  INTERVAL HPI/OVERNIGHT EVENTS: Pt seen and evaluated at the bedside. No acute overnight events occurred. Pt reports he is breathing comfortably. States he feels the difference between BIPAP machine here and home is he uses water at home in his. Reports he feels better with hospital BIPAP.    ----  PAST MEDICAL & SURGICAL HISTORY:  PVD (peripheral vascular disease)  Hypertension  COPD (chronic obstructive pulmonary disease)  Osteomyelitis  Dyslipidemia  CAD (Coronary Artery Disease)  Diabetes Mellitus, Type II  Compound fracture: left leg  CABG (Coronary Artery Bypass Graft)      FAMILY HISTORY:  Family history of diabetes mellitus (Sibling)      ----  MEDICATIONS  (STANDING):  ALBUTerol    0.083% 2.5 milliGRAM(s) Nebulizer every 4 hours  aspirin enteric coated 81 milliGRAM(s) Oral daily  atorvastatin 40 milliGRAM(s) Oral at bedtime  azithromycin  IVPB 500 milliGRAM(s) IV Intermittent every 24 hours  azithromycin  IVPB      buDESOnide   0.5 milliGRAM(s) Respule 0.5 milliGRAM(s) Inhalation every 12 hours  buDESOnide 160 MICROgram(s)/formoterol 4.5 MICROgram(s) Inhaler 2 Puff(s) Inhalation two times a day  clopidogrel Tablet 75 milliGRAM(s) Oral daily  dextrose 5% + sodium chloride 0.45%. 1000 milliLiter(s) (50 mL/Hr) IV Continuous <Continuous>  dextrose 5%. 1000 milliLiter(s) (50 mL/Hr) IV Continuous <Continuous>  dextrose 50% Injectable 12.5 Gram(s) IV Push once  dextrose 50% Injectable 25 Gram(s) IV Push once  dextrose 50% Injectable 25 Gram(s) IV Push once  enoxaparin Injectable 40 milliGRAM(s) SubCutaneous daily  insulin lispro (HumaLOG) corrective regimen sliding scale   SubCutaneous Before meals and at bedtime  losartan 25 milliGRAM(s) Oral daily  methylPREDNISolone sodium succinate Injectable 40 milliGRAM(s) IV Push daily  metoprolol tartrate 12.5 milliGRAM(s) Oral two times a day  multivitamin 1 Tablet(s) Oral daily  tamsulosin 0.4 milliGRAM(s) Oral at bedtime  tiotropium 18 MICROgram(s) Capsule 1 Capsule(s) Inhalation daily    MEDICATIONS  (PRN):  dextrose 40% Gel 15 Gram(s) Oral once PRN Blood Glucose LESS THAN 70 milliGRAM(s)/deciliter  glucagon  Injectable 1 milliGRAM(s) IntraMuscular once PRN Glucose LESS THAN 70 milligrams/deciliter      ----  REVIEW OF SYSTEMS:  CONSTITUTIONAL: denies fever, chills, fatigue, weakness  HEENT: denies blurred vision, sore throat  SKIN: denies new lesions, rash  CARDIOVASCULAR: denies chest pain, chest pressure, palpitations  RESPIRATORY: denies shortness of breath, sputum production  GASTROINTESTINAL: denies nausea, vomiting, diarrhea, abdominal pain  GENITOURINARY: denies dysuria, discharge  NEUROLOGICAL: denies numbness, headache, focal weakness  MUSCULOSKELETAL: denies new joint pain, muscle aches  HEMATOLOGIC: denies gross bleeding, bruising  LYMPHATICS: denies enlarged lymph nodes, extremity swelling  PSYCHIATRIC: denies recent changes in anxiety, depression  ENDOCRINOLOGIC: denies sweating, cold or heat intolerance    ----  PHYSICAL EXAM:  GENERAL: no acute distress, resting comfortably in bed on NC  EYES: sclera clear, no exudates, PERRL  ENMT: oropharynx clear without erythema, no exudates, moist mucous membranes, no stridor  NECK: supple, soft, no thyromegaly noted  LUNGS: expiratory wheezing, symmetric breath sounds  HEART: soft S1/S2, regular rate and rhythm, no murmurs noted, no lower extremity edema  GASTROINTESTINAL: abdomen is soft, nontender, nondistended, normoactive bowel sounds, no palpable masses  INTEGUMENT: warm, dry, normal color  MUSCULOSKELETAL: no clubbing or cyanosis, no obvious deformity  NEUROLOGIC: awake, alert, oriented x3, good muscle tone in 4 extremities, no obvious sensory deficits    T(C): 36.7 (19 @ 07:42), Max: 37.3 (19 @ 12:59)  HR: 90 (19 @ 08:13) (70 - 105)  BP: 118/73 (19 @ 07:42) (100/62 - 118/73)  RR: 19 (19 @ 07:42) (18 - 20)  SpO2: 99% (19 @ 08:13) (96% - 100%)  Wt(kg): --    ----  I&O's Summary    12 Mar 2019 07:  -  13 Mar 2019 07:00  --------------------------------------------------------  IN: 1700 mL / OUT: 850 mL / NET: 850 mL    13 Mar 2019 07:  -  13 Mar 2019 09:05  --------------------------------------------------------  IN: 0 mL / OUT: 300 mL / NET: -300 mL        LABS:                        11.0   8.49  )-----------( 214      ( 13 Mar 2019 07:06 )             36.1         142  |  103  |  15  ----------------------------<  203<H>  4.1   |  36<H>  |  1.10    Ca    8.8      13 Mar 2019 06:38  Phos  2.6         TPro  5.5<L>  /  Alb  2.9<L>  /  TBili  0.3  /  DBili  x   /  AST  9<L>  /  ALT  25  /  AlkPhos  70      PT/INR - ( 12 Mar 2019 07:10 )   PT: 11.8 sec;   INR: 1.03 ratio           Urinalysis Basic - ( 11 Mar 2019 11:48 )    Color: Yellow / Appearance: Clear / S.025 / pH: x  Gluc: x / Ketone: Small  / Bili: Negative / Urobili: Negative   Blood: x / Protein: 75 mg/dL / Nitrite: Negative   Leuk Esterase: Negative / RBC: 0-2 /HPF / WBC 0-2   Sq Epi: x / Non Sq Epi: Negative / Bacteria: Occasional      CAPILLARY BLOOD GLUCOSE      POCT Blood Glucose.: 239 mg/dL (13 Mar 2019 07:51)  POCT Blood Glucose.: 183 mg/dL (12 Mar 2019 21:41)  POCT Blood Glucose.: 222 mg/dL (12 Mar 2019 16:35)  POCT Blood Glucose.: 338 mg/dL (12 Mar 2019 12:17)    ABG - ( 11 Mar 2019 20:24 )  pH, Arterial: 7.31  pH, Blood: x     /  pCO2: 58    /  pO2: 115   / HCO3: 26    / Base Excess: 2.9   /  SaO2: 98                 @ 17:10   No growth  --  --   @ 12:08   No growth to date.  --  --            ----  Personally reviewed:  Vital sign trends: [x  ] yes    [  ] no     [  ] n/a  Laboratory results: [ x ] yes    [  ] no     [  ] n/a  Radiology results: [  ] yes    [  ] no     [  ] n/a  Culture results: [ x ] yes    [  ] no     [  ] n/a  Consultant recommendations: [ x ] yes    [  ] no     [  ] n/a

## 2019-03-13 NOTE — PROGRESS NOTE ADULT - ASSESSMENT
77 yo male with PMHx of HTN, DM2 (on home Metformin and glipizide), COPD (2L home/ BIPAP at night), CAD with 3v CABG 2004, HLD, hx hypercapnic resp failure with recent intubation c/b with cardiac arrest (12/2018), presenting to ED admitted with acute on chronic hypercapnic respiratory failure 2/2 copd exacerbation     Problem/Plan - 1:  ·  Problem: Respiratory failure, acute-on-chronic.  Plan: acute on chronic respiratory failure 2/2 copd exacerbation currently using NC. Evaluated by Dr. Oleg ashley - continue with albuterol nebs pumicort, symbicort and spirvia.   lower extremity dopplers and cxr neg, procal neg, blood and urine cx neg, oct 2018 echo EF 55-60, will fu repeat echo and consider cardio consult pending echo  cont with NC to keep spO2 >88, BIPAP at night, NPO while on BIPAP     Problem/Plan - 2:  ·  Problem: Diabetes Mellitus, Type II.  Plan: - hold home PO meds   -  insulin coverage scale.   - will advance diet  - hypoglycemia protocol.      Problem/Plan - 3:  ·  Problem: Hypertension.  Plan: - BP stable   - c/w metoprolol and losartan for interchange with valsartan.      Problem/Plan - 4:  ·  Problem: Dyslipidemia.  Plan: - c/w statin.      Problem/Plan - 5:  ·  Problem: CAD (Coronary Artery Disease).  Plan: - continue with aspirin, plavix   - c/w beta blocker.      Problem/Plan - 6:  Problem: Prophylactic measure. Plan: - lovenox 40 QD    Advanced care planning: pt was intubated and had a cardiac arrest back in june 2018 per wife- pt wants to remain full code. 77 yo male with PMHx of HTN, DM2 (on home Metformin and glipizide), COPD (2L home/ BIPAP at night), CAD with 3v CABG 2004, HLD, hx hypercapnic resp failure with recent intubation c/b with cardiac arrest (12/2018), presenting to ED admitted with acute on chronic hypercapnic respiratory failure 2/2 copd exacerbation     Problem/Plan - 1:  ·  Problem: Respiratory failure, acute-on-chronic.  Plan: acute on chronic respiratory failure 2/2 copd exacerbation currently using NC. Evaluated by Dr. Oleg ashley - continue with albuterol nebs pumicort, symbicort and spirvia.   lower extremity dopplers and cxr neg, procal neg, blood and urine cx neg, oct 2018 echo EF 55-60, will fu repeat echo and consider cardio consult pending echo  cont with NC to keep spO2 >88, BIPAP at night, NPO while on BIPAP  will have respiratory check his home BIPAP     Problem/Plan - 2:  ·  Problem: Diabetes Mellitus, Type II.  Plan: - hold home PO meds   -  insulin coverage scale. increased to mod as pt on steroids and blood sugars running high  - will advance diet  - hypoglycemia protocol.      Problem/Plan - 3:  ·  Problem: Hypertension.  Plan: - BP stable   - c/w metoprolol and losartan for interchange with valsartan.      Problem/Plan - 4:  ·  Problem: Dyslipidemia.  Plan: - c/w statin.      Problem/Plan - 5:  ·  Problem: CAD (Coronary Artery Disease).  Plan: - continue with aspirin, plavix   - c/w beta blocker.      Problem/Plan - 6:  Problem: Prophylactic measure. Plan: - lovenox 40 QD    Advanced care planning: pt was intubated and had a cardiac arrest back in june 2018 per wife- pt wants to remain full code.

## 2019-03-14 ENCOUNTER — TRANSCRIPTION ENCOUNTER (OUTPATIENT)
Age: 79
End: 2019-03-14

## 2019-03-14 VITALS
DIASTOLIC BLOOD PRESSURE: 66 MMHG | RESPIRATION RATE: 19 BRPM | SYSTOLIC BLOOD PRESSURE: 106 MMHG | TEMPERATURE: 98 F | OXYGEN SATURATION: 100 % | HEART RATE: 100 BPM

## 2019-03-14 LAB
ANION GAP SERPL CALC-SCNC: 6 MMOL/L — SIGNIFICANT CHANGE UP (ref 5–17)
BASE EXCESS BLDA CALC-SCNC: 8.5 MMOL/L — HIGH (ref -2–2)
BLOOD GAS COMMENTS ARTERIAL: SIGNIFICANT CHANGE UP
BLOOD GAS COMMENTS ARTERIAL: SIGNIFICANT CHANGE UP
BUN SERPL-MCNC: 16 MG/DL — SIGNIFICANT CHANGE UP (ref 7–23)
CALCIUM SERPL-MCNC: 9 MG/DL — SIGNIFICANT CHANGE UP (ref 8.5–10.1)
CHLORIDE SERPL-SCNC: 102 MMOL/L — SIGNIFICANT CHANGE UP (ref 96–108)
CO2 SERPL-SCNC: 34 MMOL/L — HIGH (ref 22–31)
CREAT SERPL-MCNC: 1 MG/DL — SIGNIFICANT CHANGE UP (ref 0.5–1.3)
GLUCOSE SERPL-MCNC: 169 MG/DL — HIGH (ref 70–99)
HCO3 BLDA-SCNC: 31 MMOL/L — HIGH (ref 23–27)
HCT VFR BLD CALC: 36.4 % — LOW (ref 39–50)
HGB BLD-MCNC: 11.2 G/DL — LOW (ref 13–17)
HOROWITZ INDEX BLDA+IHG-RTO: 32 — SIGNIFICANT CHANGE UP
MCHC RBC-ENTMCNC: 27.7 PG — SIGNIFICANT CHANGE UP (ref 27–34)
MCHC RBC-ENTMCNC: 30.8 GM/DL — LOW (ref 32–36)
MCV RBC AUTO: 90.1 FL — SIGNIFICANT CHANGE UP (ref 80–100)
NRBC # BLD: 0 /100 WBCS — SIGNIFICANT CHANGE UP (ref 0–0)
PCO2 BLDA: 54 MMHG — HIGH (ref 32–46)
PH BLDA: 7.41 — SIGNIFICANT CHANGE UP (ref 7.35–7.45)
PLATELET # BLD AUTO: 224 K/UL — SIGNIFICANT CHANGE UP (ref 150–400)
PO2 BLDA: 69 MMHG — LOW (ref 74–108)
POTASSIUM SERPL-MCNC: 3.7 MMOL/L — SIGNIFICANT CHANGE UP (ref 3.5–5.3)
POTASSIUM SERPL-SCNC: 3.7 MMOL/L — SIGNIFICANT CHANGE UP (ref 3.5–5.3)
RBC # BLD: 4.04 M/UL — LOW (ref 4.2–5.8)
RBC # FLD: 13.2 % — SIGNIFICANT CHANGE UP (ref 10.3–14.5)
SAO2 % BLDA: 94 % — SIGNIFICANT CHANGE UP (ref 92–96)
SODIUM SERPL-SCNC: 142 MMOL/L — SIGNIFICANT CHANGE UP (ref 135–145)
WBC # BLD: 7.28 K/UL — SIGNIFICANT CHANGE UP (ref 3.8–10.5)
WBC # FLD AUTO: 7.28 K/UL — SIGNIFICANT CHANGE UP (ref 3.8–10.5)

## 2019-03-14 PROCEDURE — 84100 ASSAY OF PHOSPHORUS: CPT

## 2019-03-14 PROCEDURE — 87449 NOS EACH ORGANISM AG IA: CPT

## 2019-03-14 PROCEDURE — 93306 TTE W/DOPPLER COMPLETE: CPT

## 2019-03-14 PROCEDURE — 94760 N-INVAS EAR/PLS OXIMETRY 1: CPT

## 2019-03-14 PROCEDURE — 36415 COLL VENOUS BLD VENIPUNCTURE: CPT

## 2019-03-14 PROCEDURE — 83605 ASSAY OF LACTIC ACID: CPT

## 2019-03-14 PROCEDURE — 87899 AGENT NOS ASSAY W/OPTIC: CPT

## 2019-03-14 PROCEDURE — 94660 CPAP INITIATION&MGMT: CPT

## 2019-03-14 PROCEDURE — 99239 HOSP IP/OBS DSCHRG MGMT >30: CPT

## 2019-03-14 PROCEDURE — 87086 URINE CULTURE/COLONY COUNT: CPT

## 2019-03-14 PROCEDURE — 82553 CREATINE MB FRACTION: CPT

## 2019-03-14 PROCEDURE — 85027 COMPLETE CBC AUTOMATED: CPT

## 2019-03-14 PROCEDURE — 84145 PROCALCITONIN (PCT): CPT

## 2019-03-14 PROCEDURE — 85610 PROTHROMBIN TIME: CPT

## 2019-03-14 PROCEDURE — 85730 THROMBOPLASTIN TIME PARTIAL: CPT

## 2019-03-14 PROCEDURE — 83735 ASSAY OF MAGNESIUM: CPT

## 2019-03-14 PROCEDURE — 99285 EMERGENCY DEPT VISIT HI MDM: CPT | Mod: 25

## 2019-03-14 PROCEDURE — 93005 ELECTROCARDIOGRAM TRACING: CPT

## 2019-03-14 PROCEDURE — 87040 BLOOD CULTURE FOR BACTERIA: CPT

## 2019-03-14 PROCEDURE — 94640 AIRWAY INHALATION TREATMENT: CPT

## 2019-03-14 PROCEDURE — 71045 X-RAY EXAM CHEST 1 VIEW: CPT

## 2019-03-14 PROCEDURE — 84484 ASSAY OF TROPONIN QUANT: CPT

## 2019-03-14 PROCEDURE — 80053 COMPREHEN METABOLIC PANEL: CPT

## 2019-03-14 PROCEDURE — 93970 EXTREMITY STUDY: CPT

## 2019-03-14 PROCEDURE — 83880 ASSAY OF NATRIURETIC PEPTIDE: CPT

## 2019-03-14 PROCEDURE — 82803 BLOOD GASES ANY COMBINATION: CPT

## 2019-03-14 PROCEDURE — 82962 GLUCOSE BLOOD TEST: CPT

## 2019-03-14 PROCEDURE — 83036 HEMOGLOBIN GLYCOSYLATED A1C: CPT

## 2019-03-14 PROCEDURE — 80048 BASIC METABOLIC PNL TOTAL CA: CPT

## 2019-03-14 PROCEDURE — 36600 WITHDRAWAL OF ARTERIAL BLOOD: CPT

## 2019-03-14 PROCEDURE — 81001 URINALYSIS AUTO W/SCOPE: CPT

## 2019-03-14 RX ADMIN — BUDESONIDE AND FORMOTEROL FUMARATE DIHYDRATE 2 PUFF(S): 160; 4.5 AEROSOL RESPIRATORY (INHALATION) at 05:43

## 2019-03-14 RX ADMIN — TIOTROPIUM BROMIDE 1 CAPSULE(S): 18 CAPSULE ORAL; RESPIRATORY (INHALATION) at 05:43

## 2019-03-14 RX ADMIN — Medication 1 TABLET(S): at 12:30

## 2019-03-14 RX ADMIN — Medication 0.5 MILLIGRAM(S): at 07:24

## 2019-03-14 RX ADMIN — ENOXAPARIN SODIUM 40 MILLIGRAM(S): 100 INJECTION SUBCUTANEOUS at 12:31

## 2019-03-14 RX ADMIN — Medication 4: at 07:53

## 2019-03-14 RX ADMIN — Medication 8: at 12:30

## 2019-03-14 RX ADMIN — ALBUTEROL 2.5 MILLIGRAM(S): 90 AEROSOL, METERED ORAL at 07:24

## 2019-03-14 RX ADMIN — Medication 81 MILLIGRAM(S): at 12:30

## 2019-03-14 RX ADMIN — CLOPIDOGREL BISULFATE 75 MILLIGRAM(S): 75 TABLET, FILM COATED ORAL at 12:30

## 2019-03-14 RX ADMIN — ALBUTEROL 2.5 MILLIGRAM(S): 90 AEROSOL, METERED ORAL at 02:11

## 2019-03-14 RX ADMIN — Medication 40 MILLIGRAM(S): at 05:43

## 2019-03-14 RX ADMIN — ALBUTEROL 2.5 MILLIGRAM(S): 90 AEROSOL, METERED ORAL at 11:05

## 2019-03-14 RX ADMIN — Medication 12.5 MILLIGRAM(S): at 05:42

## 2019-03-14 RX ADMIN — AZITHROMYCIN 250 MILLIGRAM(S): 500 TABLET, FILM COATED ORAL at 12:30

## 2019-03-14 RX ADMIN — LOSARTAN POTASSIUM 25 MILLIGRAM(S): 100 TABLET, FILM COATED ORAL at 05:42

## 2019-03-14 NOTE — PROGRESS NOTE ADULT - SUBJECTIVE AND OBJECTIVE BOX
COMMBRIANNE TURNER [facility] P    ALLERGY Shellfish  CONTACT Sp Amira C    REASON FOR VISIT Subsequent pulm fu   Initial evaluation/Pulmonary consultation requested  3/11/2019 from Dr Dejesus by Dr Alston    Patient examined chart reviewed  HOSPITAL ADMISSION [facility] P HOSPITALIST 3/11/2019  PATIENT CAME  FROM (if information available)      VITALS/LABS COMMODORE TURNER     3/14/2019 afeb 83 110/72 18 99%   3/14/2019 w 7.2 Hb 11.2 Plt 224 Na 142 K 3.7 CO2 34 Cr 1   3/14/2019 On his mach 3l O2 741/54/69    REVIEW OF SYMPTOMS    Able to give ROS  NO     PHYSICAL EXAM    HEENT Unremarkable PERRLA atraumatic   RESP Fair air entry EXP prolonged    Harsh breath sound Resp distres mild   CARDIAC S1 S2 No S3     NO JVD    ABDOMEN SOFT BS PRESENT NOT DISTENDED No hepatosplenomegaly PEDAL EDEMA present No calf tenderness  NO rash   GENERAL Not TOXIC looking    GLOBAL ISSUE/BEST PRACTICE:      PROBLEM: HOB elevation:   y            PROBLEM: Stress ulcer proph:    na                      PROBLEM: VTE prophylaxis:      Lvnx 40 (3/11)   PROBLEM: Glycemic control:    iss (3/11)   PROBLEM: Nutrition:    npo (3/11) ch Dash (3/12)   PROBLEM: Advanced directive: na     PROBLEM: Allergies:  shellfish    MEDICATIONS COMMODORE TURNER      DVT P   Lvnx 40 (3/11)   ANTIBIO   azithro (3/11)   DM   iss (3/11)   COPD   Albuterol.6 (3/11)   Symbicort (3/11)   Solumed 40.2 (3/11) ch Solumed 40 (3/12)   spiriva (3/11)   CAD   Losartan 25 (3/11)   ASA 81 (3/11)   Plavix 75 (3/11)  Metoprolol 12.5x2 (3/11)   BPH  Tamsulosin .4 (3/11)     ASSESSMENT RECOMMENDATION PATIENT PATRIC YUE 3/11/2019 ADMISSION [facility] P HOSPITALIST     ACUTE COMBINED RESP FAILURE   3/119p 18/8/.3 731/58/115   3/13/2019 rt [facility] to dw pt how to detach humidifier from his home machine (as it bothers him)   Improving on niv  Monitor abg po  NOTE RT adjusted settings of patient home niv to DREAM WEARFULL FACE MASK (currently on  (L) mask) Settings PS max 20 cm PS min 8 cm EPAAP max 16 cm EPAP min 8 cm Rste 16 Vol 500   COPD EX   On BD steroids   RESP TRACT INFECTION     3/11/2019 W 12.3  Azithro for antiinflamm effect even through oc was n   RO DVT  3/11/2019 V duplex legs n   On dvt p   RO MI   hx CABGx3, 10/26/2018 ECHO ef 55%    12/20/2017 ECHO  n lvsf diast dysfn ef 55%  3/11-3/12/2019 Tr 1 n.2  No active ischemia          PLAN SYNOPSIS PATIENT PATRIC TURNER 3/11/2019 ADMISSION [facility] P HOSPITALIST  78 m with HO advanced COPD recurrent hospital admissions GOLD stage D admitted 3/11/2019 with COPD ex ac on chr combined resp failure   AC COMBINED RESP FAILURE Responding to niv Monitor abg po Improving Has niv at home 3/13/2019 rt at [facility] to dw pt how to detach humidifier from his home machine (as it bothers him) New script for home niv given   COPD EX On BS steroids Improving May change to pred 30 taper over 9 d 3/14     TIME SPENT Over 25 minutes aggregate care time spent on encounter; activities included   direct patient care, counseling and/or coordinating care reviewing notes, lab data/ imaging , discussion with multidisciplinary team/ patient  /family. Risks, benefits, alternatives  discussed in detail.

## 2019-03-14 NOTE — DISCHARGE NOTE NURSING/CASE MANAGEMENT/SOCIAL WORK - NSDCDPATPORTLINK_GEN_ALL_CORE
You can access the [facility] Patient Portal, offered by [facility], by registering with the following website: [email or web address]

## 2019-03-19 NOTE — PATIENT PROFILE ADULT - NSPRONUTRITIONRISK_GEN_A_NUR
Crutches have been fitted and delivered to pt. For any other orthopedic assistance please don't hesitate to call the traction team @ [phone].   No indicators present

## 2019-04-04 ENCOUNTER — INPATIENT (INPATIENT)
Facility: HOSPITAL | Age: 79
LOS: 2 days | Discharge: HOME | DRG: 189 | End: 2019-04-07
Attending: HOSPITALIST | Admitting: INTERNAL MEDICINE
Payer: COMMERCIAL

## 2019-04-04 VITALS
TEMPERATURE: 98 F | OXYGEN SATURATION: 97 % | SYSTOLIC BLOOD PRESSURE: 140 MMHG | HEART RATE: 84 BPM | WEIGHT: 199.96 LBS | DIASTOLIC BLOOD PRESSURE: 90 MMHG | RESPIRATION RATE: 18 BRPM

## 2019-04-04 DIAGNOSIS — I48.91 UNSPECIFIED ATRIAL FIBRILLATION: ICD-10-CM

## 2019-04-04 DIAGNOSIS — Z29.9 ENCOUNTER FOR PROPHYLACTIC MEASURES, UNSPECIFIED: ICD-10-CM

## 2019-04-04 DIAGNOSIS — E78.5 HYPERLIPIDEMIA, UNSPECIFIED: ICD-10-CM

## 2019-04-04 DIAGNOSIS — I47.1 SUPRAVENTRICULAR TACHYCARDIA: ICD-10-CM

## 2019-04-04 DIAGNOSIS — E11.9 TYPE 2 DIABETES MELLITUS WITHOUT COMPLICATIONS: ICD-10-CM

## 2019-04-04 DIAGNOSIS — Z71.89 OTHER SPECIFIED COUNSELING: ICD-10-CM

## 2019-04-04 DIAGNOSIS — R06.03 ACUTE RESPIRATORY DISTRESS: ICD-10-CM

## 2019-04-04 DIAGNOSIS — T14.8XXA OTHER INJURY OF UNSPECIFIED BODY REGION, INITIAL ENCOUNTER: Chronic | ICD-10-CM

## 2019-04-04 DIAGNOSIS — R00.0 TACHYCARDIA, UNSPECIFIED: ICD-10-CM

## 2019-04-04 DIAGNOSIS — I25.10 ATHEROSCLEROTIC HEART DISEASE OF NATIVE CORONARY ARTERY WITHOUT ANGINA PECTORIS: ICD-10-CM

## 2019-04-04 DIAGNOSIS — J96.02 ACUTE RESPIRATORY FAILURE WITH HYPERCAPNIA: ICD-10-CM

## 2019-04-04 DIAGNOSIS — I73.9 PERIPHERAL VASCULAR DISEASE, UNSPECIFIED: ICD-10-CM

## 2019-04-04 DIAGNOSIS — J44.1 CHRONIC OBSTRUCTIVE PULMONARY DISEASE WITH (ACUTE) EXACERBATION: ICD-10-CM

## 2019-04-04 DIAGNOSIS — R60.0 LOCALIZED EDEMA: ICD-10-CM

## 2019-04-04 DIAGNOSIS — N40.0 BENIGN PROSTATIC HYPERPLASIA WITHOUT LOWER URINARY TRACT SYMPTOMS: ICD-10-CM

## 2019-04-04 LAB
ALBUMIN SERPL ELPH-MCNC: 3.9 G/DL — SIGNIFICANT CHANGE UP (ref 3.3–5)
ALP SERPL-CCNC: 88 U/L — SIGNIFICANT CHANGE UP (ref 40–120)
ALT FLD-CCNC: 35 U/L — SIGNIFICANT CHANGE UP (ref 12–78)
ANION GAP SERPL CALC-SCNC: 7 MMOL/L — SIGNIFICANT CHANGE UP (ref 5–17)
AST SERPL-CCNC: 28 U/L — SIGNIFICANT CHANGE UP (ref 15–37)
BASE EXCESS BLDA CALC-SCNC: 5.6 MMOL/L — HIGH (ref -2–2)
BASE EXCESS BLDA CALC-SCNC: 6.3 MMOL/L — HIGH (ref -2–2)
BASE EXCESS BLDA CALC-SCNC: 8.1 MMOL/L — HIGH (ref -2–2)
BASOPHILS # BLD AUTO: 0.04 K/UL — SIGNIFICANT CHANGE UP (ref 0–0.2)
BASOPHILS NFR BLD AUTO: 0.6 % — SIGNIFICANT CHANGE UP (ref 0–2)
BILIRUB SERPL-MCNC: 0.4 MG/DL — SIGNIFICANT CHANGE UP (ref 0.2–1.2)
BLOOD GAS COMMENTS ARTERIAL: SIGNIFICANT CHANGE UP
BUN SERPL-MCNC: 14 MG/DL — SIGNIFICANT CHANGE UP (ref 7–23)
CALCIUM SERPL-MCNC: 9.7 MG/DL — SIGNIFICANT CHANGE UP (ref 8.5–10.1)
CHLORIDE SERPL-SCNC: 99 MMOL/L — SIGNIFICANT CHANGE UP (ref 96–108)
CK MB BLD-MCNC: 2.6 % — SIGNIFICANT CHANGE UP (ref 0–3.5)
CK MB CFR SERPL CALC: 4.7 NG/ML — HIGH (ref 0–3.6)
CK SERPL-CCNC: 179 U/L — SIGNIFICANT CHANGE UP (ref 26–308)
CO2 SERPL-SCNC: 34 MMOL/L — HIGH (ref 22–31)
CREAT SERPL-MCNC: 1.1 MG/DL — SIGNIFICANT CHANGE UP (ref 0.5–1.3)
EOSINOPHIL # BLD AUTO: 0.33 K/UL — SIGNIFICANT CHANGE UP (ref 0–0.5)
EOSINOPHIL NFR BLD AUTO: 4.7 % — SIGNIFICANT CHANGE UP (ref 0–6)
FLU A RESULT: SIGNIFICANT CHANGE UP
FLU A RESULT: SIGNIFICANT CHANGE UP
FLUAV AG NPH QL: SIGNIFICANT CHANGE UP
FLUBV AG NPH QL: SIGNIFICANT CHANGE UP
GLUCOSE SERPL-MCNC: 170 MG/DL — HIGH (ref 70–99)
HCO3 BLDA-SCNC: 28 MMOL/L — HIGH (ref 23–27)
HCO3 BLDA-SCNC: 28 MMOL/L — HIGH (ref 23–27)
HCO3 BLDA-SCNC: 30 MMOL/L — HIGH (ref 23–27)
HCT VFR BLD CALC: 43.1 % — SIGNIFICANT CHANGE UP (ref 39–50)
HGB BLD-MCNC: 13 G/DL — SIGNIFICANT CHANGE UP (ref 13–17)
HOROWITZ INDEX BLDA+IHG-RTO: 21 — SIGNIFICANT CHANGE UP
HOROWITZ INDEX BLDA+IHG-RTO: 21 — SIGNIFICANT CHANGE UP
HOROWITZ INDEX BLDA+IHG-RTO: 50 — SIGNIFICANT CHANGE UP
IMM GRANULOCYTES NFR BLD AUTO: 0.4 % — SIGNIFICANT CHANGE UP (ref 0–1.5)
LACTATE SERPL-SCNC: 1.4 MMOL/L — SIGNIFICANT CHANGE UP (ref 0.7–2)
LYMPHOCYTES # BLD AUTO: 0.94 K/UL — LOW (ref 1–3.3)
LYMPHOCYTES # BLD AUTO: 13.3 % — SIGNIFICANT CHANGE UP (ref 13–44)
MCHC RBC-ENTMCNC: 27.6 PG — SIGNIFICANT CHANGE UP (ref 27–34)
MCHC RBC-ENTMCNC: 30.2 GM/DL — LOW (ref 32–36)
MCV RBC AUTO: 91.5 FL — SIGNIFICANT CHANGE UP (ref 80–100)
MONOCYTES # BLD AUTO: 0.65 K/UL — SIGNIFICANT CHANGE UP (ref 0–0.9)
MONOCYTES NFR BLD AUTO: 9.2 % — SIGNIFICANT CHANGE UP (ref 2–14)
NEUTROPHILS # BLD AUTO: 5.1 K/UL — SIGNIFICANT CHANGE UP (ref 1.8–7.4)
NEUTROPHILS NFR BLD AUTO: 71.8 % — SIGNIFICANT CHANGE UP (ref 43–77)
NRBC # BLD: 0 /100 WBCS — SIGNIFICANT CHANGE UP (ref 0–0)
NT-PROBNP SERPL-SCNC: 64 PG/ML — SIGNIFICANT CHANGE UP (ref 0–450)
PCO2 BLDA: 67 MMHG — HIGH (ref 32–46)
PCO2 BLDA: 76 MMHG — CRITICAL HIGH (ref 32–46)
PCO2 BLDA: 81 MMHG — CRITICAL HIGH (ref 32–46)
PH BLDA: 7.24 — LOW (ref 7.35–7.45)
PH BLDA: 7.28 — LOW (ref 7.35–7.45)
PH BLDA: 7.3 — LOW (ref 7.35–7.45)
PLATELET # BLD AUTO: 280 K/UL — SIGNIFICANT CHANGE UP (ref 150–400)
PO2 BLDA: 201 MMHG — HIGH (ref 74–108)
PO2 BLDA: 216 MMHG — HIGH (ref 74–108)
PO2 BLDA: 82 MMHG — SIGNIFICANT CHANGE UP (ref 74–108)
POTASSIUM SERPL-MCNC: 4.4 MMOL/L — SIGNIFICANT CHANGE UP (ref 3.5–5.3)
POTASSIUM SERPL-SCNC: 4.4 MMOL/L — SIGNIFICANT CHANGE UP (ref 3.5–5.3)
PROCALCITONIN SERPL-MCNC: <0.05 — SIGNIFICANT CHANGE UP (ref 0–0.04)
PROT SERPL-MCNC: 7.2 G/DL — SIGNIFICANT CHANGE UP (ref 6–8.3)
RBC # BLD: 4.71 M/UL — SIGNIFICANT CHANGE UP (ref 4.2–5.8)
RBC # FLD: 13.5 % — SIGNIFICANT CHANGE UP (ref 10.3–14.5)
RSV RESULT: SIGNIFICANT CHANGE UP
RSV RNA RESP QL NAA+PROBE: SIGNIFICANT CHANGE UP
SAO2 % BLDA: 100 % — HIGH (ref 92–96)
SAO2 % BLDA: 100 % — HIGH (ref 92–96)
SAO2 % BLDA: 95 % — SIGNIFICANT CHANGE UP (ref 92–96)
SODIUM SERPL-SCNC: 140 MMOL/L — SIGNIFICANT CHANGE UP (ref 135–145)
TROPONIN I SERPL-MCNC: <.015 NG/ML — SIGNIFICANT CHANGE UP (ref 0.01–0.04)
WBC # BLD: 7.09 K/UL — SIGNIFICANT CHANGE UP (ref 3.8–10.5)
WBC # FLD AUTO: 7.09 K/UL — SIGNIFICANT CHANGE UP (ref 3.8–10.5)

## 2019-04-04 PROCEDURE — 99291 CRITICAL CARE FIRST HOUR: CPT

## 2019-04-04 PROCEDURE — 99223 1ST HOSP IP/OBS HIGH 75: CPT | Mod: AI,GC

## 2019-04-04 PROCEDURE — 93010 ELECTROCARDIOGRAM REPORT: CPT

## 2019-04-04 PROCEDURE — 71045 X-RAY EXAM CHEST 1 VIEW: CPT | Mod: 26

## 2019-04-04 RX ORDER — IPRATROPIUM/ALBUTEROL SULFATE 18-103MCG
3 AEROSOL WITH ADAPTER (GRAM) INHALATION EVERY 6 HOURS
Qty: 0 | Refills: 0 | Status: DISCONTINUED | OUTPATIENT
Start: 2019-04-04 | End: 2019-04-07

## 2019-04-04 RX ORDER — VALSARTAN 80 MG/1
80 TABLET ORAL DAILY
Qty: 0 | Refills: 0 | Status: DISCONTINUED | OUTPATIENT
Start: 2019-04-04 | End: 2019-04-07

## 2019-04-04 RX ORDER — ASPIRIN/CALCIUM CARB/MAGNESIUM 324 MG
81 TABLET ORAL DAILY
Qty: 0 | Refills: 0 | Status: DISCONTINUED | OUTPATIENT
Start: 2019-04-04 | End: 2019-04-07

## 2019-04-04 RX ORDER — DEXTROSE 50 % IN WATER 50 %
25 SYRINGE (ML) INTRAVENOUS ONCE
Qty: 0 | Refills: 0 | Status: DISCONTINUED | OUTPATIENT
Start: 2019-04-04 | End: 2019-04-07

## 2019-04-04 RX ORDER — DILTIAZEM HCL 120 MG
10 CAPSULE, EXT RELEASE 24 HR ORAL ONCE
Qty: 0 | Refills: 0 | Status: COMPLETED | OUTPATIENT
Start: 2019-04-04 | End: 2019-04-04

## 2019-04-04 RX ORDER — FUROSEMIDE 40 MG
40 TABLET ORAL DAILY
Qty: 0 | Refills: 0 | Status: DISCONTINUED | OUTPATIENT
Start: 2019-04-04 | End: 2019-04-06

## 2019-04-04 RX ORDER — BUDESONIDE AND FORMOTEROL FUMARATE DIHYDRATE 160; 4.5 UG/1; UG/1
2 AEROSOL RESPIRATORY (INHALATION)
Qty: 0 | Refills: 0 | Status: DISCONTINUED | OUTPATIENT
Start: 2019-04-04 | End: 2019-04-04

## 2019-04-04 RX ORDER — METOPROLOL TARTRATE 50 MG
12.5 TABLET ORAL
Qty: 0 | Refills: 0 | Status: DISCONTINUED | OUTPATIENT
Start: 2019-04-04 | End: 2019-04-07

## 2019-04-04 RX ORDER — IPRATROPIUM/ALBUTEROL SULFATE 18-103MCG
3 AEROSOL WITH ADAPTER (GRAM) INHALATION
Qty: 0 | Refills: 0 | Status: COMPLETED | OUTPATIENT
Start: 2019-04-04 | End: 2019-04-04

## 2019-04-04 RX ORDER — ATORVASTATIN CALCIUM 80 MG/1
40 TABLET, FILM COATED ORAL AT BEDTIME
Qty: 0 | Refills: 0 | Status: DISCONTINUED | OUTPATIENT
Start: 2019-04-04 | End: 2019-04-07

## 2019-04-04 RX ORDER — CLOPIDOGREL BISULFATE 75 MG/1
75 TABLET, FILM COATED ORAL DAILY
Qty: 0 | Refills: 0 | Status: DISCONTINUED | OUTPATIENT
Start: 2019-04-04 | End: 2019-04-07

## 2019-04-04 RX ORDER — GLUCAGON INJECTION, SOLUTION 0.5 MG/.1ML
1 INJECTION, SOLUTION SUBCUTANEOUS ONCE
Qty: 0 | Refills: 0 | Status: DISCONTINUED | OUTPATIENT
Start: 2019-04-04 | End: 2019-04-07

## 2019-04-04 RX ORDER — SODIUM CHLORIDE 9 MG/ML
1000 INJECTION, SOLUTION INTRAVENOUS
Qty: 0 | Refills: 0 | Status: DISCONTINUED | OUTPATIENT
Start: 2019-04-04 | End: 2019-04-07

## 2019-04-04 RX ORDER — ALBUTEROL 90 UG/1
2.5 AEROSOL, METERED ORAL ONCE
Qty: 0 | Refills: 0 | Status: COMPLETED | OUTPATIENT
Start: 2019-04-04 | End: 2019-04-04

## 2019-04-04 RX ORDER — BUDESONIDE, MICRONIZED 100 %
0.5 POWDER (GRAM) MISCELLANEOUS ONCE
Qty: 0 | Refills: 0 | Status: COMPLETED | OUTPATIENT
Start: 2019-04-04 | End: 2019-04-04

## 2019-04-04 RX ORDER — DEXTROSE 50 % IN WATER 50 %
15 SYRINGE (ML) INTRAVENOUS ONCE
Qty: 0 | Refills: 0 | Status: DISCONTINUED | OUTPATIENT
Start: 2019-04-04 | End: 2019-04-07

## 2019-04-04 RX ORDER — TAMSULOSIN HYDROCHLORIDE 0.4 MG/1
0.4 CAPSULE ORAL AT BEDTIME
Qty: 0 | Refills: 0 | Status: DISCONTINUED | OUTPATIENT
Start: 2019-04-04 | End: 2019-04-07

## 2019-04-04 RX ORDER — INSULIN LISPRO 100/ML
VIAL (ML) SUBCUTANEOUS
Qty: 0 | Refills: 0 | Status: DISCONTINUED | OUTPATIENT
Start: 2019-04-04 | End: 2019-04-07

## 2019-04-04 RX ORDER — ENOXAPARIN SODIUM 100 MG/ML
40 INJECTION SUBCUTANEOUS DAILY
Qty: 0 | Refills: 0 | Status: DISCONTINUED | OUTPATIENT
Start: 2019-04-04 | End: 2019-04-07

## 2019-04-04 RX ORDER — MAGNESIUM SULFATE 500 MG/ML
2 VIAL (ML) INJECTION ONCE
Qty: 0 | Refills: 0 | Status: COMPLETED | OUTPATIENT
Start: 2019-04-04 | End: 2019-04-04

## 2019-04-04 RX ORDER — BUDESONIDE AND FORMOTEROL FUMARATE DIHYDRATE 160; 4.5 UG/1; UG/1
2 AEROSOL RESPIRATORY (INHALATION)
Qty: 0 | Refills: 0 | Status: DISCONTINUED | OUTPATIENT
Start: 2019-04-04 | End: 2019-04-07

## 2019-04-04 RX ORDER — DEXTROSE 50 % IN WATER 50 %
12.5 SYRINGE (ML) INTRAVENOUS ONCE
Qty: 0 | Refills: 0 | Status: DISCONTINUED | OUTPATIENT
Start: 2019-04-04 | End: 2019-04-07

## 2019-04-04 RX ADMIN — TAMSULOSIN HYDROCHLORIDE 0.4 MILLIGRAM(S): 0.4 CAPSULE ORAL at 22:01

## 2019-04-04 RX ADMIN — Medication 125 MILLIGRAM(S): at 16:04

## 2019-04-04 RX ADMIN — Medication 10 MILLIGRAM(S): at 17:25

## 2019-04-04 RX ADMIN — Medication 40 MILLIGRAM(S): at 22:01

## 2019-04-04 RX ADMIN — ATORVASTATIN CALCIUM 40 MILLIGRAM(S): 80 TABLET, FILM COATED ORAL at 22:01

## 2019-04-04 RX ADMIN — Medication 3 MILLILITER(S): at 15:40

## 2019-04-04 RX ADMIN — Medication 50 GRAM(S): at 16:03

## 2019-04-04 RX ADMIN — ENOXAPARIN SODIUM 40 MILLIGRAM(S): 100 INJECTION SUBCUTANEOUS at 22:02

## 2019-04-04 RX ADMIN — Medication 3 MILLILITER(S): at 15:56

## 2019-04-04 RX ADMIN — Medication 0.5 MILLIGRAM(S): at 15:59

## 2019-04-04 RX ADMIN — Medication 3 MILLILITER(S): at 15:20

## 2019-04-04 RX ADMIN — ALBUTEROL 2.5 MILLIGRAM(S): 90 AEROSOL, METERED ORAL at 15:59

## 2019-04-04 RX ADMIN — Medication 3 MILLILITER(S): at 19:56

## 2019-04-04 NOTE — H&P ADULT - PROBLEM SELECTOR PLAN 2
- likely 2/2 to respiratory distress  - EKG showing SVT rather than Afib, patient is s/p cardizem drip, with rate in low 100s  - continue to monitor on tele  - proBMP 64  - follow up cardiac enzymes  - cardio, Dr. Calabrese, follow up recs

## 2019-04-04 NOTE — CONSULT NOTE ADULT - SUBJECTIVE AND OBJECTIVE BOX
Patient is a 78y old  Male who presents with a chief complaint of     BRIEF HOSPITAL COURSE: ***    Events last 24 hours: ***      PAST MEDICAL & SURGICAL HISTORY:  PVD (peripheral vascular disease)  Hypertension  COPD (chronic obstructive pulmonary disease)  Osteomyelitis  Dyslipidemia  CAD (Coronary Artery Disease)  Diabetes Mellitus, Type II  Compound fracture: left leg  CABG (Coronary Artery Bypass Graft)    Allergies    No Known Allergies    Intolerances    shellfish (Nausea)    FAMILY HISTORY:  Family history of diabetes mellitus (Sibling)      Review of Systems:  CONSTITUTIONAL: No fever, chills, or fatigue  EYES: No eye pain, visual disturbances, or discharge  ENMT:  No difficulty hearing, tinnitus, vertigo; No sinus or throat pain  NECK: No pain or stiffness  RESPIRATORY: No cough, wheezing, chills or hemoptysis; No shortness of breath  CARDIOVASCULAR: No chest pain, palpitations, dizziness, or leg swelling  GASTROINTESTINAL: No abdominal or epigastric pain. No nausea, vomiting, or hematemesis; No diarrhea or constipation. No melena or hematochezia.  GENITOURINARY: No dysuria, frequency, hematuria, or incontinence  NEUROLOGICAL: No headaches, memory loss, loss of strength, numbness, or tremors  SKIN: No itching, burning, rashes, or lesions   MUSCULOSKELETAL: No joint pain or swelling; No muscle, back, or extremity pain  PSYCHIATRIC: No depression, anxiety, mood swings, or difficulty sleeping      Social History: ***      Medications:  levoFLOXacin IVPB        furosemide   Injectable 40 milliGRAM(s) IV Push daily  metoprolol tartrate 12.5 milliGRAM(s) Oral two times a day  tamsulosin 0.4 milliGRAM(s) Oral at bedtime  valsartan 80 milliGRAM(s) Oral daily    ALBUTerol/ipratropium for Nebulization 3 milliLiter(s) Nebulizer every 6 hours  buDESOnide 160 MICROgram(s)/formoterol 4.5 MICROgram(s) Inhaler 2 Puff(s) Inhalation two times a day        aspirin enteric coated 81 milliGRAM(s) Oral daily  clopidogrel Tablet 75 milliGRAM(s) Oral daily  enoxaparin Injectable 40 milliGRAM(s) SubCutaneous daily        atorvastatin 40 milliGRAM(s) Oral at bedtime  dextrose 40% Gel 15 Gram(s) Oral once PRN  dextrose 50% Injectable 12.5 Gram(s) IV Push once  dextrose 50% Injectable 25 Gram(s) IV Push once  dextrose 50% Injectable 25 Gram(s) IV Push once  glucagon  Injectable 1 milliGRAM(s) IntraMuscular once PRN  insulin lispro (HumaLOG) corrective regimen sliding scale   SubCutaneous three times a day before meals  methylPREDNISolone sodium succinate Injectable 40 milliGRAM(s) IV Push every 8 hours    dextrose 5%. 1000 milliLiter(s) IV Continuous <Continuous>  multivitamin 1 Tablet(s) Oral daily                ICU Vital Signs Last 24 Hrs  T(C): 36.4 (04 Apr 2019 20:51), Max: 36.7 (04 Apr 2019 14:56)  T(F): 97.6 (04 Apr 2019 20:51), Max: 98 (04 Apr 2019 14:56)  HR: 100 (04 Apr 2019 20:51) (84 - 175)  BP: 127/57 (04 Apr 2019 20:51) (91/43 - 140/90)  BP(mean): --  ABP: --  ABP(mean): --  RR: 20 (04 Apr 2019 20:51) (18 - 20)  SpO2: 98% (04 Apr 2019 20:51) (96% - 100%)    Vital Signs Last 24 Hrs  T(C): 36.4 (04 Apr 2019 20:51), Max: 36.7 (04 Apr 2019 14:56)  T(F): 97.6 (04 Apr 2019 20:51), Max: 98 (04 Apr 2019 14:56)  HR: 100 (04 Apr 2019 20:51) (84 - 175)  BP: 127/57 (04 Apr 2019 20:51) (91/43 - 140/90)  BP(mean): --  RR: 20 (04 Apr 2019 20:51) (18 - 20)  SpO2: 98% (04 Apr 2019 20:51) (96% - 100%)    ABG - ( 04 Apr 2019 22:25 )  pH, Arterial: 7.30  pH, Blood: x     /  pCO2: 67    /  pO2: 82    / HCO3: 28    / Base Excess: 5.6   /  SaO2: 95                  I&O's Detail        LABS:                        13.0   7.09  )-----------( 280      ( 04 Apr 2019 16:14 )             43.1     04-04    140  |  99  |  14  ----------------------------<  170<H>  4.4   |  34<H>  |  1.10    Ca    9.7      04 Apr 2019 16:14    TPro  7.2  /  Alb  3.9  /  TBili  0.4  /  DBili  x   /  AST  28  /  ALT  35  /  AlkPhos  88  04-04      CARDIAC MARKERS ( 04 Apr 2019 19:58 )  <.015 ng/mL / x     / 179 U/L / x     / 4.7 ng/mL      CAPILLARY BLOOD GLUCOSE      POCT Blood Glucose.: 223 mg/dL (04 Apr 2019 22:17)        CULTURES:        Physical Examination:    General: No acute distress.  Alert, oriented, interactive, nonfocal    HEENT: Pupils equal, reactive to light.  Symmetric.    PULM: Clear to auscultation bilaterally, no significant sputum production    CVS: Regular rate and rhythm, no murmurs, rubs, or gallops    ABD: Soft, nondistended, nontender, normoactive bowel sounds, no masses    EXT: No edema, nontender    SKIN: Warm and well perfused, no rashes noted.    NEURO: A&Ox3, strength 5/5 all extremities, cranial nerves grossly intact, no focal deficits      RADIOLOGY: ***        CRITICAL CARE TIME SPENT: ***  Time spent evaluating/treating patient with medical issues that pose a high risk for life threatening deterioration and/or end-organ damage, reviewing data/labs/imaging, discussing case with multidisciplinary team, discussing plan/goals of care with patient/family. Non-inclusive of procedure time. Patient is a 78y old  Male who presents with a chief complaint of     BRIEF HOSPITAL COURSE: 77 y/o M with a h/o COPD (on 2L home O2/BiPAP qHS), CAD (s/p CABG), HTN, HLD, DM, recent admission for hypercapnic respiratory failure with subsequent cardiac arrest, presents to the ED c/o SOB and AMS.     Events last 24 hours: ***      PAST MEDICAL & SURGICAL HISTORY:  PVD (peripheral vascular disease)  Hypertension  COPD (chronic obstructive pulmonary disease)  Osteomyelitis  Dyslipidemia  CAD (Coronary Artery Disease)  Diabetes Mellitus, Type II  Compound fracture: left leg  CABG (Coronary Artery Bypass Graft)    Allergies    No Known Allergies    Intolerances    shellfish (Nausea)    FAMILY HISTORY:  Family history of diabetes mellitus (Sibling)      Review of Systems:  CONSTITUTIONAL: No fever, chills, or fatigue  EYES: No eye pain, visual disturbances, or discharge  ENMT:  No difficulty hearing, tinnitus, vertigo; No sinus or throat pain  NECK: No pain or stiffness  RESPIRATORY: No cough, wheezing, chills or hemoptysis; No shortness of breath  CARDIOVASCULAR: No chest pain, palpitations, dizziness, or leg swelling  GASTROINTESTINAL: No abdominal or epigastric pain. No nausea, vomiting, or hematemesis; No diarrhea or constipation. No melena or hematochezia.  GENITOURINARY: No dysuria, frequency, hematuria, or incontinence  NEUROLOGICAL: No headaches, memory loss, loss of strength, numbness, or tremors  SKIN: No itching, burning, rashes, or lesions   MUSCULOSKELETAL: No joint pain or swelling; No muscle, back, or extremity pain  PSYCHIATRIC: No depression, anxiety, mood swings, or difficulty sleeping      Social History: ***      Medications:  levoFLOXacin IVPB        furosemide   Injectable 40 milliGRAM(s) IV Push daily  metoprolol tartrate 12.5 milliGRAM(s) Oral two times a day  tamsulosin 0.4 milliGRAM(s) Oral at bedtime  valsartan 80 milliGRAM(s) Oral daily    ALBUTerol/ipratropium for Nebulization 3 milliLiter(s) Nebulizer every 6 hours  buDESOnide 160 MICROgram(s)/formoterol 4.5 MICROgram(s) Inhaler 2 Puff(s) Inhalation two times a day        aspirin enteric coated 81 milliGRAM(s) Oral daily  clopidogrel Tablet 75 milliGRAM(s) Oral daily  enoxaparin Injectable 40 milliGRAM(s) SubCutaneous daily        atorvastatin 40 milliGRAM(s) Oral at bedtime  dextrose 40% Gel 15 Gram(s) Oral once PRN  dextrose 50% Injectable 12.5 Gram(s) IV Push once  dextrose 50% Injectable 25 Gram(s) IV Push once  dextrose 50% Injectable 25 Gram(s) IV Push once  glucagon  Injectable 1 milliGRAM(s) IntraMuscular once PRN  insulin lispro (HumaLOG) corrective regimen sliding scale   SubCutaneous three times a day before meals  methylPREDNISolone sodium succinate Injectable 40 milliGRAM(s) IV Push every 8 hours    dextrose 5%. 1000 milliLiter(s) IV Continuous <Continuous>  multivitamin 1 Tablet(s) Oral daily                ICU Vital Signs Last 24 Hrs  T(C): 36.4 (04 Apr 2019 20:51), Max: 36.7 (04 Apr 2019 14:56)  T(F): 97.6 (04 Apr 2019 20:51), Max: 98 (04 Apr 2019 14:56)  HR: 100 (04 Apr 2019 20:51) (84 - 175)  BP: 127/57 (04 Apr 2019 20:51) (91/43 - 140/90)  BP(mean): --  ABP: --  ABP(mean): --  RR: 20 (04 Apr 2019 20:51) (18 - 20)  SpO2: 98% (04 Apr 2019 20:51) (96% - 100%)    Vital Signs Last 24 Hrs  T(C): 36.4 (04 Apr 2019 20:51), Max: 36.7 (04 Apr 2019 14:56)  T(F): 97.6 (04 Apr 2019 20:51), Max: 98 (04 Apr 2019 14:56)  HR: 100 (04 Apr 2019 20:51) (84 - 175)  BP: 127/57 (04 Apr 2019 20:51) (91/43 - 140/90)  BP(mean): --  RR: 20 (04 Apr 2019 20:51) (18 - 20)  SpO2: 98% (04 Apr 2019 20:51) (96% - 100%)    ABG - ( 04 Apr 2019 22:25 )  pH, Arterial: 7.30  pH, Blood: x     /  pCO2: 67    /  pO2: 82    / HCO3: 28    / Base Excess: 5.6   /  SaO2: 95                  I&O's Detail        LABS:                        13.0   7.09  )-----------( 280      ( 04 Apr 2019 16:14 )             43.1     04-04    140  |  99  |  14  ----------------------------<  170<H>  4.4   |  34<H>  |  1.10    Ca    9.7      04 Apr 2019 16:14    TPro  7.2  /  Alb  3.9  /  TBili  0.4  /  DBili  x   /  AST  28  /  ALT  35  /  AlkPhos  88  04-04      CARDIAC MARKERS ( 04 Apr 2019 19:58 )  <.015 ng/mL / x     / 179 U/L / x     / 4.7 ng/mL      CAPILLARY BLOOD GLUCOSE      POCT Blood Glucose.: 223 mg/dL (04 Apr 2019 22:17)        CULTURES:        Physical Examination:    General: No acute distress.  Alert, oriented, interactive, nonfocal    HEENT: Pupils equal, reactive to light.  Symmetric.    PULM: Clear to auscultation bilaterally, no significant sputum production    CVS: Regular rate and rhythm, no murmurs, rubs, or gallops    ABD: Soft, nondistended, nontender, normoactive bowel sounds, no masses    EXT: No edema, nontender    SKIN: Warm and well perfused, no rashes noted.    NEURO: A&Ox3, strength 5/5 all extremities, cranial nerves grossly intact, no focal deficits      RADIOLOGY: ***        CRITICAL CARE TIME SPENT: ***  Time spent evaluating/treating patient with medical issues that pose a high risk for life threatening deterioration and/or end-organ damage, reviewing data/labs/imaging, discussing case with multidisciplinary team, discussing plan/goals of care with patient/family. Non-inclusive of procedure time. Patient is a 78y old  Male who presents with a chief complaint of     BRIEF HOSPITAL COURSE: 77 y/o M with a h/o COPD (on 2L home O2/BiPAP qHS), CAD (s/p CABG), HTN, HLD, DM, recent admission for hypercapnic respiratory failure with subsequent cardiac arrest, presents to the ED c/o SOB and AMS. Was noted to be in the midst of an acute COPD exacerbation with associated hypercapnic respiratory failure. Was started on NIPPV. ICU evaluation requested when repeat blood gas worsened (7.24/81/201/28). Was also noted to be in SVT. BP stable. Afebrile.          PAST MEDICAL & SURGICAL HISTORY:  PVD (peripheral vascular disease)  Hypertension  COPD (chronic obstructive pulmonary disease)  Osteomyelitis  Dyslipidemia  CAD (Coronary Artery Disease)  Diabetes Mellitus, Type II  Compound fracture: left leg  CABG (Coronary Artery Bypass Graft)    Allergies    No Known Allergies    Intolerances    shellfish (Nausea)    FAMILY HISTORY:  Family history of diabetes mellitus (Sibling)      Review of Systems:  CONSTITUTIONAL: No fever, chills, (+)fatigue  EYES: No eye pain, visual disturbances, or discharge  ENMT:  No difficulty hearing, tinnitus, vertigo; No sinus or throat pain  NECK: No pain or stiffness  RESPIRATORY: (+) cough, (+)wheezing, no chills or hemoptysis; (+) shortness of breath  CARDIOVASCULAR: No chest pain, palpitations, dizziness, (+) leg swelling  GASTROINTESTINAL: No abdominal or epigastric pain. No nausea, vomiting, or hematemesis; No diarrhea or constipation. No melena or hematochezia.  GENITOURINARY: No dysuria, frequency, hematuria, or incontinence  NEUROLOGICAL: No headaches, memory loss, loss of strength, numbness, or tremors  SKIN: No itching, burning, rashes, or lesions   MUSCULOSKELETAL: No joint pain or swelling; No muscle, back, or extremity pain  PSYCHIATRIC: No depression, anxiety, mood swings, or difficulty sleeping      Social History: former smoker, occasional EtOH and former marijuana use      Medications:  levoFLOXacin IVPB      furosemide   Injectable 40 milliGRAM(s) IV Push daily  metoprolol tartrate 12.5 milliGRAM(s) Oral two times a day  tamsulosin 0.4 milliGRAM(s) Oral at bedtime  valsartan 80 milliGRAM(s) Oral daily  ALBUTerol/ipratropium for Nebulization 3 milliLiter(s) Nebulizer every 6 hours  buDESOnide 160 MICROgram(s)/formoterol 4.5 MICROgram(s) Inhaler 2 Puff(s) Inhalation two times a day  aspirin enteric coated 81 milliGRAM(s) Oral daily  clopidogrel Tablet 75 milliGRAM(s) Oral daily  enoxaparin Injectable 40 milliGRAM(s) SubCutaneous daily  atorvastatin 40 milliGRAM(s) Oral at bedtime  dextrose 40% Gel 15 Gram(s) Oral once PRN  dextrose 50% Injectable 12.5 Gram(s) IV Push once  dextrose 50% Injectable 25 Gram(s) IV Push once  dextrose 50% Injectable 25 Gram(s) IV Push once  glucagon  Injectable 1 milliGRAM(s) IntraMuscular once PRN  insulin lispro (HumaLOG) corrective regimen sliding scale   SubCutaneous three times a day before meals  methylPREDNISolone sodium succinate Injectable 40 milliGRAM(s) IV Push every 8 hours  dextrose 5%. 1000 milliLiter(s) IV Continuous <Continuous>  multivitamin 1 Tablet(s) Oral daily          ICU Vital Signs Last 24 Hrs  T(C): 36.4 (04 Apr 2019 20:51), Max: 36.7 (04 Apr 2019 14:56)  T(F): 97.6 (04 Apr 2019 20:51), Max: 98 (04 Apr 2019 14:56)  HR: 100 (04 Apr 2019 20:51) (84 - 175)  BP: 127/57 (04 Apr 2019 20:51) (91/43 - 140/90)  BP(mean): --  ABP: --  ABP(mean): --  RR: 20 (04 Apr 2019 20:51) (18 - 20)  SpO2: 98% (04 Apr 2019 20:51) (96% - 100%)    Vital Signs Last 24 Hrs  T(C): 36.4 (04 Apr 2019 20:51), Max: 36.7 (04 Apr 2019 14:56)  T(F): 97.6 (04 Apr 2019 20:51), Max: 98 (04 Apr 2019 14:56)  HR: 100 (04 Apr 2019 20:51) (84 - 175)  BP: 127/57 (04 Apr 2019 20:51) (91/43 - 140/90)  BP(mean): --  RR: 20 (04 Apr 2019 20:51) (18 - 20)  SpO2: 98% (04 Apr 2019 20:51) (96% - 100%)    ABG - ( 04 Apr 2019 22:25 )  pH, Arterial: 7.30  pH, Blood: x     /  pCO2: 67    /  pO2: 82    / HCO3: 28    / Base Excess: 5.6   /  SaO2: 95                  I&O's Detail        LABS:                        13.0   7.09  )-----------( 280      ( 04 Apr 2019 16:14 )             43.1     04-04    140  |  99  |  14  ----------------------------<  170<H>  4.4   |  34<H>  |  1.10    Ca    9.7      04 Apr 2019 16:14    TPro  7.2  /  Alb  3.9  /  TBili  0.4  /  DBili  x   /  AST  28  /  ALT  35  /  AlkPhos  88  04-04      CARDIAC MARKERS ( 04 Apr 2019 19:58 )  <.015 ng/mL / x     / 179 U/L / x     / 4.7 ng/mL      CAPILLARY BLOOD GLUCOSE      POCT Blood Glucose.: 223 mg/dL (04 Apr 2019 22:17)        CULTURES:        Physical Examination:    General: No acute distress.  Alert, oriented, interactive, nonfocal, on BiPAP, fatigued    HEENT: Pupils equal, reactive to light.  Symmetric.    PULM: severely diminished bilaterally with mild expiratory wheeze diffusely, no significant sputum production    CVS: Regular rate and rhythm, no murmurs, rubs, or gallops    ABD: Soft, nondistended, nontender, normoactive bowel sounds, no masses    EXT: trace b/l edema, nontender    SKIN: Warm and well perfused, no rashes noted.    NEURO: A&Ox3, strength 5/5 all extremities, cranial nerves grossly intact, no focal deficits      RADIOLOGY:     < from: Xray Chest 1 View AP/PA (04.04.19 @ 15:54) >  FINDINGS:    LUNGS/PLEURA: No focal consolidation, pleural effusion, or pneumothorax.  MEDIASTINUM: Cardiomediastinal silhouette is unremarkable.  OTHER: Sternotomy.    IMPRESSION:     No focal consolidation or pleural effusion.            CRITICAL CARE TIME SPENT: 40 mins  Time spent evaluating/treating patient with medical issues that pose a high risk for life threatening deterioration and/or end-organ damage, reviewing data/labs/imaging, discussing case with multidisciplinary team, discussing plan/goals of care with patient/family. Non-inclusive of procedure time.

## 2019-04-04 NOTE — ED ADULT NURSE REASSESSMENT NOTE - NS ED NURSE REASSESS COMMENT FT1
pt sitting up in bed, improved on bipap, a/o x 3, vitals improved, HR 98 and pressure improved, aware of plan of care, awaiting bed status, will remain on bipap, will continue to monitor

## 2019-04-04 NOTE — H&P ADULT - PROBLEM SELECTOR PLAN 8
Lovenox for DVT PPX  OOB with assistance  Fall precautions  continue BIPAP, with NC at daytime  IMPROVE VTE Individual Risk Assessment          RISK                                                          Points  [  ] Previous VTE                                                3  [  ] Thrombophilia                                             2  [  ] Lower limb paralysis                                   2        (unable to hold up >15 seconds)    [  ] Current Cancer                                             2         (within 6 months)  [  ] Immobilization > 24 hrs                              1  [  ] ICU/CCU stay > 24 hours                             1  [  ] Age > 60                                                         1    IMPROVE VTE Score: 1

## 2019-04-04 NOTE — H&P ADULT - NSHPREVIEWOFSYSTEMS_GEN_ALL_CORE
Constitutional: Denies fever, chills, general malaise  HEENT: Denies sore throat, runny nose, vision changes, eye pain, difficulty hearing, dizziness, dysphagia, epistaxis  Respiratory: +shortness of breath at rest and with exertion, + wheezing. Denies sputum production, hemoptysis  Cardiovascular: + edema in bilateral fee. Denies chest pain, palpitations  Gastrointestinal: Denies nausea, vomiting, diarrhea, constipation, abdominal pain, melena, hematochezia   Genitourinary: Denies dysuria, hematuria, frequency, urgency, incontinence  Musculoskeletal: Denies muscle pains, muscle weakness, joint pain   Neurologic: Denies syncope, loss of consciousness, headache, weakness, dizziness, paresthesias, numbness, tingling, confusion, dementia   Psychiatric: Denies feeling anxious, depressed, suicidal, or homicidal thoughts    ROS negative except as noted above

## 2019-04-04 NOTE — H&P ADULT - NSHPSOCIALHISTORY_GEN_ALL_CORE
Lives with wife, ambulates independently at baseline.  Former smoker (quit 25 years ago), 1ppd/20 years.  Denies EtOH, recreational drug use.  Got flu shot this year.

## 2019-04-04 NOTE — CONSULT NOTE ADULT - PROBLEM SELECTOR RECOMMENDATION 2
Precipitating factors unclear. Bronchodilators and IV steroids. NIPPV as above. Cover empirically with antibiotic therapy. Send RVP.

## 2019-04-04 NOTE — ED ADULT NURSE NOTE - NSIMPLEMENTINTERV_GEN_ALL_ED
Implemented All Fall with Harm Risk Interventions:  Kite to call system. Call bell, personal items and telephone within reach. Instruct patient to call for assistance. Room bathroom lighting operational. Non-slip footwear when patient is off stretcher. Physically safe environment: no spills, clutter or unnecessary equipment. Stretcher in lowest position, wheels locked, appropriate side rails in place. Provide visual cue, wrist band, yellow gown, etc. Monitor gait and stability. Monitor for mental status changes and reorient to person, place, and time. Review medications for side effects contributing to fall risk. Reinforce activity limits and safety measures with patient and family. Provide visual clues: red socks.

## 2019-04-04 NOTE — CONSULT NOTE ADULT - PROBLEM SELECTOR RECOMMENDATION 3
Secondary to respiratory failure. Better rate control now with improving respiratory status. Continue telemetry monitoring.

## 2019-04-04 NOTE — ED PROVIDER NOTE - CARE PLAN
Principal Discharge DX:	COPD exacerbation Principal Discharge DX:	COPD exacerbation  Secondary Diagnosis:	Atrial fibrillation with rapid ventricular response

## 2019-04-04 NOTE — CONSULT NOTE ADULT - PROBLEM SELECTOR RECOMMENDATION 9
Secondary to acute COPD exacerbation. Continue NIPPV overnight, especially since he wears BiPAP qHS as an outpatient. Initially hypercapnia and respiratory acidosis worsened, but patient had been on setting of 10/5, increased to 18/8 with good response. Titrate to maintain SaO2 > 88% and avoid hyperoxia. Wean to NC in the morning as tolerated. Keep HOB elevated > 30 degrees. No acute pathology on CXR. Has some b/l LE edema, this is probably secondary to RV failure due to pulmonary hypertension.

## 2019-04-04 NOTE — H&P ADULT - NSHPPHYSICALEXAM_GEN_ALL_CORE
Physical Exam:  General: No acute distress, on BIPAP  HEENT: Normocephallic Atraumatic, PERRLA, EOMI bl, moist mucous membranes   Neck: Supple, nontender, no mass  Neurology: AA&Ox3, no focal deficits, sensation intact  Respiratory: + wheeze, coarse breath sounds in bilateral bases  CV: tachycardic regular rhythm, +S1/S2, no murmurs, rubs or gallops  Abdominal: Soft, Non-Tender, Non-Distended +Bowel Soundsx4  Extremities: + 2 pitting edema in bilateral   Musculoskeletal: , no joint erythema or warmth, no joint swelling   Skin: warm, dry, normal color, no rash or abnormal lesions

## 2019-04-04 NOTE — H&P ADULT - NSICDXPASTMEDICALHX_GEN_ALL_CORE_FT
PAST MEDICAL HISTORY:  CAD (Coronary Artery Disease)     COPD (chronic obstructive pulmonary disease)     Diabetes Mellitus, Type II     Dyslipidemia     Hypertension     Osteomyelitis     PVD (peripheral vascular disease)

## 2019-04-04 NOTE — H&P ADULT - PROBLEM SELECTOR PLAN 1
- with hypercapnic respiratory failure, respiratory acidosis  - continue BIPAP at 12/5/50 percent  - repeat ABG at 9 pm  - continue duonebs  - follow up blood cultures  - follow up legionella antigen  - pulm, Dr. Combs consulted  - ICU consult  - continue symbicort  - continue solumedrol 40 mg IV Q8  - Will start patient on Levaquin   - Flu A/B, RSV negative, will order complete RVP pane;

## 2019-04-04 NOTE — H&P ADULT - ASSESSMENT
77 yo male with PMHx of HTN, DM2 (on home Metformin and glipizide), COPD (2L home/ BIPAP at night), CAD with 3v CABG 2004, HLD, hx hypercapnic resp failure with recent intubation c/b with cardiac arrest (6/2018) being admitted for hypercapnic respiratory failure 2/2 acute COPD exacerbation.

## 2019-04-04 NOTE — H&P ADULT - HISTORY OF PRESENT ILLNESS
77 yo male with PMHx of HTN, DM2 (on home Metformin and glipizide), COPD (2L home/ BIPAP at night), CAD with 3v CABG 2004, HLD, hx hypercapnic resp failure with recent intubation c/b with cardiac arrest (6/2018)    In ED T 98  then came down to 107 after cardizem  BP 91/43 then 128/76 RR 18 O2 sat 97 on RA  Labs significant for serum CO2 34, glucose 170  ABG pH 7.28 pCO2 76 pO2 216 HCO3 30 Base excess 8.1  CXR No focal consolidation or pleural effusion.  12 lead EKG shows   Received albuterol x1, duonebs x1, budesonide x1, cardizem 10 mg IV x1, Magnesium sulfate 2 g IV x1, solumedrol 125 mg IV x1 79 yo male with PMHx of HTN, DM2 (on home Metformin and glipizide), COPD (2L home/ BIPAP at night), CAD with 3v CABG 2004, HLD, hx hypercapnic resp failure with recent intubation c/b with cardiac arrest (6/2018), presenting to the ED for SOB and confusion starting today. Patient's wife states that this morning he had increased work of breathing. She took his vital signs. His BP, HR were fine but she noted his O2 saturation was in the high-80s-low 90s on NC. She observed that he was taking longer than usual to answer her questions and called 911. Patient denies headache, chest pain, abdominal pain, N/V/D, body aches. No sick contacts or recent travel. Has pitting edema in bilateral LE for several months.  Was admitted to [place] for acute on chronic hypercapnic respiratory failure 2/2 COPD exacerbation, treated with azithromycin and solumedrol.  Had a TTE, EF of 55 percent, grade 1 diastolic dysfunction.    In ED T 98  then came down to 107 after cardizem  BP 91/43 then 128/76 RR 18 O2 sat 97 on RA  Labs significant for serum CO2 34, glucose 170  ABG pH 7.28 pCO2 76 pO2 216 HCO3 30 Base excess 8.1  CXR No focal consolidation or pleural effusion.  12 lead EKG shows   Received albuterol x1, duonebs x1, budesonide x1, cardizem 10 mg IV x1, Magnesium sulfate 2 g IV x1, solumedrol 125 mg IV x1 77 yo male with PMHx of HTN, DM2 (on home Metformin and glipizide), COPD (2L home/ BIPAP at night), CAD with 3v CABG 2004, HLD, hx hypercapnic resp failure with recent intubation c/b with cardiac arrest (6/2018), presenting to the ED for SOB and confusion starting today. Patient's wife states that this morning he had increased work of breathing. She took his vital signs. His BP, HR were fine but she noted his O2 saturation was in the high-80s-low 90s on NC. She observed that he was taking longer than usual to answer her questions and called 911. Patient denies headache, chest pain, abdominal pain, N/V/D, body aches. No sick contacts or recent travel. Has pitting edema in bilateral LE for several months.  Was admitted to [place] for acute on chronic hypercapnic respiratory failure 2/2 COPD exacerbation, treated with azithromycin and solumedrol.  Had a TTE, EF of 55 percent, grade 1 diastolic dysfunction.    In ED T 98  Afib  then came down to 107 after cardizem 10 mg IV  BP 91/43 then 128/76 RR 18 O2 sat 97 on RA  Labs significant for serum CO2 34, glucose 170  ABG pH 7.28 pCO2 76 pO2 216 HCO3 30 Base excess 8.1  CXR No focal consolidation or pleural effusion.  12 lead EKG shows   Received albuterol x1, duonebs x1, budesonide x1, cardizem 10 mg IV x1, Magnesium sulfate 2 g IV x1, solumedrol 125 mg IV x1

## 2019-04-04 NOTE — ED ADULT NURSE REASSESSMENT NOTE - NS ED NURSE REASSESS COMMENT FT1
pt sitting up in bed, pt in rapid afib with RVR, -180, bp 91/43, pt mentating well, 10 cardizem to be given iv

## 2019-04-04 NOTE — H&P ADULT - PROBLEM SELECTOR PLAN 3
- echo from last month shows EF of 55 percent with grade 1 diastolic dysfunction  - will diurese with lasix 40 mg IV daily  - continue to monitor BMP

## 2019-04-04 NOTE — CONSULT NOTE ADULT - SUBJECTIVE AND OBJECTIVE BOX
Date/Time Patient Seen:  		  Referring MD:   Data Reviewed	       Patient is a 78y old  Male who presents with a chief complaint of     Subjective/HPI     PAST MEDICAL & SURGICAL HISTORY:  PVD (peripheral vascular disease)  Hypertension  Diabetes mellitus  COPD (chronic obstructive pulmonary disease)  Osteomyelitis  Asymptomatic Peripheral Vascular Disease  Dyslipidemia  CAD (Coronary Artery Disease)  Diabetes Mellitus, Type II  Compound fracture: left leg  CABG (Coronary Artery Bypass Graft)        Medication list         MEDICATIONS  (STANDING):  ALBUTerol/ipratropium for Nebulization 3 milliLiter(s) Nebulizer every 6 hours  aspirin enteric coated 81 milliGRAM(s) Oral daily  atorvastatin 40 milliGRAM(s) Oral at bedtime  buDESOnide 160 MICROgram(s)/formoterol 4.5 MICROgram(s) Inhaler 2 Puff(s) Inhalation two times a day  clopidogrel Tablet 75 milliGRAM(s) Oral daily  dextrose 5%. 1000 milliLiter(s) (50 mL/Hr) IV Continuous <Continuous>  dextrose 50% Injectable 12.5 Gram(s) IV Push once  dextrose 50% Injectable 25 Gram(s) IV Push once  dextrose 50% Injectable 25 Gram(s) IV Push once  enoxaparin Injectable 40 milliGRAM(s) SubCutaneous daily  furosemide   Injectable 40 milliGRAM(s) IV Push daily  insulin lispro (HumaLOG) corrective regimen sliding scale   SubCutaneous three times a day before meals  levoFLOXacin IVPB      methylPREDNISolone sodium succinate Injectable 40 milliGRAM(s) IV Push every 8 hours  metoprolol tartrate 12.5 milliGRAM(s) Oral two times a day  multivitamin 1 Tablet(s) Oral daily  tamsulosin 0.4 milliGRAM(s) Oral at bedtime  valsartan 80 milliGRAM(s) Oral daily    MEDICATIONS  (PRN):  dextrose 40% Gel 15 Gram(s) Oral once PRN Blood Glucose LESS THAN 70 milliGRAM(s)/deciliter  glucagon  Injectable 1 milliGRAM(s) IntraMuscular once PRN Glucose LESS THAN 70 milligrams/deciliter         Vitals log        ICU Vital Signs Last 24 Hrs  T(C): 36.4 (04 Apr 2019 20:51), Max: 36.7 (04 Apr 2019 14:56)  T(F): 97.6 (04 Apr 2019 20:51), Max: 98 (04 Apr 2019 14:56)  HR: 100 (04 Apr 2019 20:51) (84 - 175)  BP: 127/57 (04 Apr 2019 20:51) (91/43 - 140/90)  BP(mean): --  ABP: --  ABP(mean): --  RR: 20 (04 Apr 2019 20:51) (18 - 20)  SpO2: 98% (04 Apr 2019 20:51) (96% - 100%)           Input and Output:  I&O's Detail      Lab Data                        13.0   7.09  )-----------( 280      ( 04 Apr 2019 16:14 )             43.1     04-04    140  |  99  |  14  ----------------------------<  170<H>  4.4   |  34<H>  |  1.10    Ca    9.7      04 Apr 2019 16:14    TPro  7.2  /  Alb  3.9  /  TBili  0.4  /  DBili  x   /  AST  28  /  ALT  35  /  AlkPhos  88  04-04    ABG - ( 04 Apr 2019 20:56 )  pH, Arterial: 7.24  pH, Blood: x     /  pCO2: 81    /  pO2: 201   / HCO3: 28    / Base Excess: 6.3   /  SaO2: 100               CARDIAC MARKERS ( 04 Apr 2019 19:58 )  <.015 ng/mL / x     / 179 U/L / x     / 4.7 ng/mL        Review of Systems	      Objective     Physical Examination        Pertinent Lab findings & Imaging      Eliecer:  NO   Adequate UO     I&O's Detail           Discussed with:     Cultures:	        Radiology

## 2019-04-04 NOTE — CONSULT NOTE ADULT - SUBJECTIVE AND OBJECTIVE BOX
Chart reviewed Consult to follow Chart reviewed Consult to follow     COMMODORE TURNER [facility]    ALLERGY [employer]  CONTACT Oumar EATON    Initial evaluation/Pulmonary Critical Care consultation requested on 4/4/2019   by Dr Dawson    from Dr Dejesus   Patient examined chart reviewed    HOSPITAL ADMISSION  4/4/2019 [facility] P DR MAYNOR THOMPSON  PATIENT CAME  FROM (if information available)        TYPE OF VISIT       Initial evaluation/Pulmonary Critical Care consultation requested on 4/4/2019   by Dr Dawson    from Dr Dejesus       REASON FOR VISIT  Please see problem list            PATIENT DESCRIPTION PATIENT COMMODORE TURNER 4/4/2019 ADMISSION [facility] P DR MAYNOR THOMPSON          History of Present Illness:   79 yo male with PMHx of HTN, DM2 (on home Metformin and glipizide), COPD (2L home/ BIPAP at night), CAD with 3v CABG 2004, HLD, hx hypercapnic resp failure with recent intubation c/b with cardiac arrest (6/2018), presenting to the ED for SOB and confusion starting today. Patient's wife states that this morning he had increased work of breathing. She took his vital signs. His BP, HR were fine but she noted his O2 saturation was in the high-80s-low 90s on NC. She observed that he was taking longer than usual to answer her questions and called 911. Patient denies headache, chest pain, abdominal pain, N/V/D, body aches. No sick contacts or recent travel. Has pitting edema in bilateral LE for several months.  Was admitted to [place] for acute on chronic hypercapnic respiratory failure 2/2 COPD exacerbation, treated with azithromycin and solumedrol.  Had a TTE, EF of 55 percent, grade 1 diastolic dysfunction.    In ED T 98  Afib  then came down to 107 after cardizem 10 mg IV  BP 91/43 then 128/76 RR 18 O2 sat 97 on RA  Labs significant for serum CO2 34, glucose 170  ABG pH 7.28 pCO2 76 pO2 216 HCO3 30 Base excess 8.1  CXR No focal consolidation or pleural effusion.  12 lead EKG shows   Received albuterol x1, duonebs x1, budesonide x1, cardizem 10 mg IV x1, Magnesium sulfate 2 g IV x1, solumedrol 125 mg IV x1       Review of Systems:  Review of Systems: Constitutional: Denies fever, chills, general malaise  HEENT: Denies sore throat, runny nose, vision changes, eye pain, difficulty hearing, dizziness, dysphagia, epistaxis  Respiratory: +shortness of breath at rest and with exertion, + wheezing. Denies sputum production, hemoptysis  Cardiovascular: + edema in bilateral fee. Denies chest pain, palpitations  Gastrointestinal: Denies nausea, vomiting, diarrhea, constipation, abdominal pain, melena, hematochezia   Genitourinary: Denies dysuria, hematuria, frequency, urgency, incontinence  Musculoskeletal: Denies muscle pains, muscle weakness, joint pain   Neurologic: Denies syncope, loss of consciousness, headache, weakness, dizziness, paresthesias, numbness, tingling, confusion, dementia   Psychiatric: Denies feeling anxious, depressed, suicidal, or homicidal thoughts    ROS negative except as noted above      Allergies and Intolerances:        Allergies:  No Known Allergies:        Intolerances:  shellfish: Food, Nausea, feels nauseous when eating crabs or shrimp but no true allergic reaction    Home Medications:   * Patient Currently Takes Medications as of 04-Apr-2019 18:54 documented in Structured Notes  · valsartan 80 mg oral tablet: 1 tab(s) orally once a day, Last Dose Taken:    · Multiple Vitamins oral tablet: 1 tab(s) orally once a day, Last Dose Taken:    · metoprolol tartrate 25 mg oral tablet: 0.5 tab(s) orally 2 times a day, Last Dose Taken:    · clopidogrel 75 mg oral tablet: 1 tab(s) orally once a day, Last Dose Taken:    · aspirin 81 mg oral delayed release tablet: 1 tab(s) orally once a day, Last Dose Taken:    · atorvastatin 40 mg oral tablet: 1 tab(s) orally once a day, Last Dose Taken:    · glipiZIDE 2.5 mg oral tablet, extended release: 1 tab(s) orally once a day, Last Dose Taken:    · tamsulosin 0.4 mg oral capsule: 1 cap(s) orally once a day (at bedtime), Last Dose Taken:    · Breo Ellipta 100 mcg-25 mcg/inh inhalation powder: 1 puff(s) inhaled once a day, Last Dose Taken:    · Ventolin HFA 90 mcg/inh inhalation aerosol: 2 puff(s) inhaled 4 times a day, Last Dose Taken:    · Incruse Ellipta 62.5 mcg/inh inhalation powder: 1 puff(s) inhaled once a day, Last Dose Taken:    · roflumilast 500 mcg oral tablet: 1 tab(s) orally once a day  · metFORMIN 1000 mg oral tablet: 1 tab(s) orally 2 times a day, Last Dose Taken:      .    Patient History:    Past Medical, Past Surgical, and Family History:  PAST MEDICAL HISTORY:  CAD (Coronary Artery Disease)     COPD (chronic obstructive pulmonary disease)     Diabetes Mellitus, Type II     Dyslipidemia     Hypertension     Osteomyelitis     PVD (peripheral vascular disease).     PAST SURGICAL HISTORY:  CABG (Coronary Artery Bypass Graft)     Compound fracture left leg.     FAMILY HISTORY:  Sibling  Still living? Unknown  Family history of diabetes mellitus, Age at diagnosis: Age Unknown.     Social History:  Social History (marital status, living situation, occupation, tobacco use, alcohol and drug use, and sexual history): Lives with wife, ambulates independently at baseline.  Former smoker (quit 25 years ago), 1ppd/20 years.  Denies EtOH, recreational drug use.  Got flu shot this year.     Tobacco Screening:  · Core Measure Site Yes  · Has the patient used tobacco in the past 30 days? No    Risk Assessment:    Present on Admission:  Deep Venous Thrombosis no  Pulmonary Embolus no     Heart Failure:  Does this patient have a history of or has been diagnosed with heart failure? no.       Physical Exam:  Physical Exam: Physical Exam:  General: No acute distress, on BIPAP  HEENT: Normocephallic Atraumatic, PERRLA, EOMI bl, moist mucous membranes   Neck: Supple, nontender, no mass  Neurology: AA&Ox3, no focal deficits, sensation intact  Respiratory: + wheeze, coarse breath sounds in bilateral bases  CV: tachycardic regular rhythm, +S1/S2, no murmurs, rubs or gallops  Abdominal: Soft, Non-Tender, Non-Distended +Bowel Soundsx4  Extremities: + 2 pitting edema in bilateral   Musculoskeletal: , no joint erythema or warmth, no joint swelling   Skin: warm, dry, normal color, no rash or abnormal lesions

## 2019-04-04 NOTE — H&P ADULT - NSICDXFAMILYHX_GEN_ALL_CORE_FT
FAMILY HISTORY:  Sibling  Still living? Unknown  Family history of diabetes mellitus, Age at diagnosis: Age Unknown

## 2019-04-04 NOTE — ED ADULT NURSE NOTE - OBJECTIVE STATEMENT
79 y/o male pmh of copd with recent admission requiring bipap presents to the ed c/o copd exacerbation. received 2 duonebs by ems. denies nausea vomiting fever chills chest pain headache abdominal pain and urinary problems. labored breathing

## 2019-04-04 NOTE — ED PROVIDER NOTE - OBJECTIVE STATEMENT
sob x 2 days, worse today.  home O2 at 2L.  pmd- M Austin HUNTER [place].  pulbriseyda Dejesus.  no other complaint.

## 2019-04-05 LAB
ALBUMIN SERPL ELPH-MCNC: 3.3 G/DL — SIGNIFICANT CHANGE UP (ref 3.3–5)
ALP SERPL-CCNC: 74 U/L — SIGNIFICANT CHANGE UP (ref 40–120)
ALT FLD-CCNC: 34 U/L — SIGNIFICANT CHANGE UP (ref 12–78)
ANION GAP SERPL CALC-SCNC: 6 MMOL/L — SIGNIFICANT CHANGE UP (ref 5–17)
AST SERPL-CCNC: 15 U/L — SIGNIFICANT CHANGE UP (ref 15–37)
BASE EXCESS BLDA CALC-SCNC: 6.5 MMOL/L — HIGH (ref -2–2)
BASOPHILS # BLD AUTO: 0.01 K/UL — SIGNIFICANT CHANGE UP (ref 0–0.2)
BASOPHILS NFR BLD AUTO: 0.2 % — SIGNIFICANT CHANGE UP (ref 0–2)
BILIRUB SERPL-MCNC: 0.4 MG/DL — SIGNIFICANT CHANGE UP (ref 0.2–1.2)
BLOOD GAS COMMENTS ARTERIAL: SIGNIFICANT CHANGE UP
BUN SERPL-MCNC: 15 MG/DL — SIGNIFICANT CHANGE UP (ref 7–23)
CALCIUM SERPL-MCNC: 9 MG/DL — SIGNIFICANT CHANGE UP (ref 8.5–10.1)
CHLORIDE SERPL-SCNC: 98 MMOL/L — SIGNIFICANT CHANGE UP (ref 96–108)
CO2 SERPL-SCNC: 35 MMOL/L — HIGH (ref 22–31)
CREAT SERPL-MCNC: 1.1 MG/DL — SIGNIFICANT CHANGE UP (ref 0.5–1.3)
EOSINOPHIL # BLD AUTO: 0 K/UL — SIGNIFICANT CHANGE UP (ref 0–0.5)
EOSINOPHIL NFR BLD AUTO: 0 % — SIGNIFICANT CHANGE UP (ref 0–6)
GLUCOSE SERPL-MCNC: 176 MG/DL — HIGH (ref 70–99)
HCO3 BLDA-SCNC: 29 MMOL/L — HIGH (ref 23–27)
HCT VFR BLD CALC: 39 % — SIGNIFICANT CHANGE UP (ref 39–50)
HGB BLD-MCNC: 11.9 G/DL — LOW (ref 13–17)
HOROWITZ INDEX BLDA+IHG-RTO: 21 — SIGNIFICANT CHANGE UP
IMM GRANULOCYTES NFR BLD AUTO: 0.3 % — SIGNIFICANT CHANGE UP (ref 0–1.5)
LEGIONELLA AG UR QL: NEGATIVE — SIGNIFICANT CHANGE UP
LYMPHOCYTES # BLD AUTO: 0.53 K/UL — LOW (ref 1–3.3)
LYMPHOCYTES # BLD AUTO: 9 % — LOW (ref 13–44)
MCHC RBC-ENTMCNC: 27.7 PG — SIGNIFICANT CHANGE UP (ref 27–34)
MCHC RBC-ENTMCNC: 30.5 GM/DL — LOW (ref 32–36)
MCV RBC AUTO: 90.9 FL — SIGNIFICANT CHANGE UP (ref 80–100)
MONOCYTES # BLD AUTO: 0.31 K/UL — SIGNIFICANT CHANGE UP (ref 0–0.9)
MONOCYTES NFR BLD AUTO: 5.2 % — SIGNIFICANT CHANGE UP (ref 2–14)
NEUTROPHILS # BLD AUTO: 5.04 K/UL — SIGNIFICANT CHANGE UP (ref 1.8–7.4)
NEUTROPHILS NFR BLD AUTO: 85.3 % — HIGH (ref 43–77)
NRBC # BLD: 0 /100 WBCS — SIGNIFICANT CHANGE UP (ref 0–0)
PCO2 BLDA: 67 MMHG — HIGH (ref 32–46)
PH BLDA: 7.31 — LOW (ref 7.35–7.45)
PLATELET # BLD AUTO: 260 K/UL — SIGNIFICANT CHANGE UP (ref 150–400)
PO2 BLDA: 136 MMHG — HIGH (ref 74–108)
POTASSIUM SERPL-MCNC: 5.1 MMOL/L — SIGNIFICANT CHANGE UP (ref 3.5–5.3)
POTASSIUM SERPL-SCNC: 5.1 MMOL/L — SIGNIFICANT CHANGE UP (ref 3.5–5.3)
PROT SERPL-MCNC: 6.2 G/DL — SIGNIFICANT CHANGE UP (ref 6–8.3)
RAPID RVP RESULT: SIGNIFICANT CHANGE UP
RBC # BLD: 4.29 M/UL — SIGNIFICANT CHANGE UP (ref 4.2–5.8)
RBC # FLD: 13.3 % — SIGNIFICANT CHANGE UP (ref 10.3–14.5)
SAO2 % BLDA: 99 % — HIGH (ref 92–96)
SODIUM SERPL-SCNC: 139 MMOL/L — SIGNIFICANT CHANGE UP (ref 135–145)
WBC # BLD: 5.91 K/UL — SIGNIFICANT CHANGE UP (ref 3.8–10.5)
WBC # FLD AUTO: 5.91 K/UL — SIGNIFICANT CHANGE UP (ref 3.8–10.5)

## 2019-04-05 PROCEDURE — 99233 SBSQ HOSP IP/OBS HIGH 50: CPT

## 2019-04-05 PROCEDURE — 93970 EXTREMITY STUDY: CPT | Mod: 26

## 2019-04-05 RX ORDER — INSULIN LISPRO 100/ML
VIAL (ML) SUBCUTANEOUS AT BEDTIME
Qty: 0 | Refills: 0 | Status: DISCONTINUED | OUTPATIENT
Start: 2019-04-05 | End: 2019-04-07

## 2019-04-05 RX ADMIN — CLOPIDOGREL BISULFATE 75 MILLIGRAM(S): 75 TABLET, FILM COATED ORAL at 12:54

## 2019-04-05 RX ADMIN — Medication 4: at 12:56

## 2019-04-05 RX ADMIN — ATORVASTATIN CALCIUM 40 MILLIGRAM(S): 80 TABLET, FILM COATED ORAL at 21:35

## 2019-04-05 RX ADMIN — Medication 3 MILLILITER(S): at 01:33

## 2019-04-05 RX ADMIN — Medication 40 MILLIGRAM(S): at 06:04

## 2019-04-05 RX ADMIN — VALSARTAN 80 MILLIGRAM(S): 80 TABLET ORAL at 06:04

## 2019-04-05 RX ADMIN — Medication 1: at 09:07

## 2019-04-05 RX ADMIN — Medication 0: at 23:55

## 2019-04-05 RX ADMIN — BUDESONIDE AND FORMOTEROL FUMARATE DIHYDRATE 2 PUFF(S): 160; 4.5 AEROSOL RESPIRATORY (INHALATION) at 06:04

## 2019-04-05 RX ADMIN — Medication 40 MILLIGRAM(S): at 12:58

## 2019-04-05 RX ADMIN — Medication 3 MILLILITER(S): at 12:58

## 2019-04-05 RX ADMIN — Medication 3 MILLILITER(S): at 08:14

## 2019-04-05 RX ADMIN — TAMSULOSIN HYDROCHLORIDE 0.4 MILLIGRAM(S): 0.4 CAPSULE ORAL at 21:35

## 2019-04-05 RX ADMIN — Medication 40 MILLIGRAM(S): at 21:35

## 2019-04-05 RX ADMIN — Medication 3 MILLILITER(S): at 20:16

## 2019-04-05 RX ADMIN — Medication 12.5 MILLIGRAM(S): at 06:04

## 2019-04-05 RX ADMIN — ENOXAPARIN SODIUM 40 MILLIGRAM(S): 100 INJECTION SUBCUTANEOUS at 12:54

## 2019-04-05 RX ADMIN — Medication 81 MILLIGRAM(S): at 12:54

## 2019-04-05 RX ADMIN — BUDESONIDE AND FORMOTEROL FUMARATE DIHYDRATE 2 PUFF(S): 160; 4.5 AEROSOL RESPIRATORY (INHALATION) at 21:34

## 2019-04-05 RX ADMIN — Medication 1 TABLET(S): at 12:58

## 2019-04-05 NOTE — PROGRESS NOTE ADULT - SUBJECTIVE AND OBJECTIVE BOX
Patient was examined Chart reviewed Detailed note will be inserted below after going over latest data Meanwhile please refer to my previous notes for Pulmonary/Critical Care assessment recommendations   abg this am acceptable Patient was examined Chart reviewed Detailed note will be inserted below after going over latest data Meanwhile please refer to my previous notes for Pulmonary/Critical Care assessment recommendations   abg this am acceptable     COMMODALAN TURNER [facility]    ALLERGY Shellfish  CONTACT Sp Vanessajulius C    Initial evaluation/Pulmonary Critical Care consultation requested on 4/4/2019   by Dr Dawson    from Dr Dejesus   Patient examined chart reviewed    HOSPITAL ADMISSION  4/4/2019 [facility] P DR MAYNOR THOMPSON  PATIENT CAME  FROM (if information available)        TYPE OF VISIT       Subsequent Pulmonary followup       REASON FOR VISIT  Please see problem list                 VITALS/LABS PATIENT COMMODORE TURNER 4/4/2019 ADMISSION [facility] P DR MAYNOR THOMPSON        4/5/2019 afeb 83 109/66 19 99%   4/5/2019 4a bpap 18/8/.4  4/5/2019 W 5.9 Hb 11.9 Plt 260 Na 139 K 5.1 CO2 35 Cr 1.1     REVIEW OF SYMPTOMS    Able to give ROS  Yes     RELIABLE No   CONSTITUTIONAL Weakness Yes  Chills No Vision changes No  ENDOCRINE No unexplained hair loss No heat or cold intolerance    ALLERGY No hives  Sore throat No   RESP Coughing blood no  Shortness of breath YES   NEURO No Headache  Confusion Pain neck No   CARDIAC No Chest pain No Palpitations   GI No Pain abdomen NO   Vomiting NO     PHYSICAL EXAM    HEENT Unremarkable PERRLA atraumatic   RESP Fair air entry EXP prolonged    Harsh breath sound Resp distres mild   CARDIAC S1 S2 No S3     NO JVD    ABDOMEN SOFT BS PRESENT NOT DISTENDED No hepatosplenomegaly PEDAL EDEMA present No calf tenderness  NO rash   GENERAL Not TOXIC looking

## 2019-04-05 NOTE — PROGRESS NOTE ADULT - SUBJECTIVE AND OBJECTIVE BOX
Date/Time Patient Seen:  		  Referring MD:   Data Reviewed	       Patient is a 78y old  Male who presents with a chief complaint of     Subjective/HPI     PAST MEDICAL & SURGICAL HISTORY:  PVD (peripheral vascular disease)  Hypertension  Diabetes mellitus  COPD (chronic obstructive pulmonary disease)  Osteomyelitis  Asymptomatic Peripheral Vascular Disease  Dyslipidemia  CAD (Coronary Artery Disease)  Diabetes Mellitus, Type II  Compound fracture: left leg  CABG (Coronary Artery Bypass Graft)        Medication list         MEDICATIONS  (STANDING):  ALBUTerol/ipratropium for Nebulization 3 milliLiter(s) Nebulizer every 6 hours  aspirin enteric coated 81 milliGRAM(s) Oral daily  atorvastatin 40 milliGRAM(s) Oral at bedtime  buDESOnide 160 MICROgram(s)/formoterol 4.5 MICROgram(s) Inhaler 2 Puff(s) Inhalation two times a day  clopidogrel Tablet 75 milliGRAM(s) Oral daily  dextrose 5%. 1000 milliLiter(s) (50 mL/Hr) IV Continuous <Continuous>  dextrose 50% Injectable 12.5 Gram(s) IV Push once  dextrose 50% Injectable 25 Gram(s) IV Push once  dextrose 50% Injectable 25 Gram(s) IV Push once  enoxaparin Injectable 40 milliGRAM(s) SubCutaneous daily  furosemide   Injectable 40 milliGRAM(s) IV Push daily  insulin lispro (HumaLOG) corrective regimen sliding scale   SubCutaneous three times a day before meals  levoFLOXacin IVPB      levoFLOXacin IVPB 750 milliGRAM(s) IV Intermittent every 24 hours  methylPREDNISolone sodium succinate Injectable 40 milliGRAM(s) IV Push every 8 hours  metoprolol tartrate 12.5 milliGRAM(s) Oral two times a day  multivitamin 1 Tablet(s) Oral daily  tamsulosin 0.4 milliGRAM(s) Oral at bedtime  valsartan 80 milliGRAM(s) Oral daily    MEDICATIONS  (PRN):  dextrose 40% Gel 15 Gram(s) Oral once PRN Blood Glucose LESS THAN 70 milliGRAM(s)/deciliter  glucagon  Injectable 1 milliGRAM(s) IntraMuscular once PRN Glucose LESS THAN 70 milligrams/deciliter         Vitals log        ICU Vital Signs Last 24 Hrs  T(C): 36.4 (05 Apr 2019 05:46), Max: 36.7 (04 Apr 2019 14:56)  T(F): 97.5 (05 Apr 2019 05:46), Max: 98 (04 Apr 2019 14:56)  HR: 89 (05 Apr 2019 05:46) (83 - 175)  BP: 113/58 (05 Apr 2019 05:46) (91/43 - 140/90)  BP(mean): --  ABP: --  ABP(mean): --  RR: 16 (05 Apr 2019 05:46) (16 - 20)  SpO2: 100% (05 Apr 2019 05:46) (90% - 100%)           Input and Output:  I&O's Detail    04 Apr 2019 07:01  -  05 Apr 2019 07:00  --------------------------------------------------------  IN:  Total IN: 0 mL    OUT:    Voided: 600 mL  Total OUT: 600 mL    Total NET: -600 mL          Lab Data                        13.0   7.09  )-----------( 280      ( 04 Apr 2019 16:14 )             43.1     04-04    140  |  99  |  14  ----------------------------<  170<H>  4.4   |  34<H>  |  1.10    Ca    9.7      04 Apr 2019 16:14    TPro  7.2  /  Alb  3.9  /  TBili  0.4  /  DBili  x   /  AST  28  /  ALT  35  /  AlkPhos  88  04-04    ABG - ( 05 Apr 2019 06:39 )  pH, Arterial: 7.31  pH, Blood: x     /  pCO2: 67    /  pO2: 136   / HCO3: 29    / Base Excess: 6.5   /  SaO2: 99                CARDIAC MARKERS ( 04 Apr 2019 19:58 )  <.015 ng/mL / x     / 179 U/L / x     / 4.7 ng/mL        Review of Systems	      Objective     Physical Examination    heart s1s2  lung dec BS  abd soft  on BIPAP        Pertinent Lab findings & Imaging      Eliecer:  NO   Adequate UO     I&O's Detail    04 Apr 2019 07:01  -  05 Apr 2019 07:00  --------------------------------------------------------  IN:  Total IN: 0 mL    OUT:    Voided: 600 mL  Total OUT: 600 mL    Total NET: -600 mL               Discussed with:     Cultures:	        Radiology

## 2019-04-05 NOTE — PROGRESS NOTE ADULT - SUBJECTIVE AND OBJECTIVE BOX
Patient is a 78y old  Male who presents with a chief complaint of  SOB    INTERVAL HPI/OVERNIGHT EVENTS: 77 yo male with PMHx of HTN, DM2 (on home Metformin and glipizide), COPD (2L home/ BIPAP at night), CAD with 3v CABG 2004, HLD, hx hypercapnic resp failure with recent intubation c/b with cardiac arrest (6/2018) being admitted for hypercapnic respiratory failure 2/2 acute COPD exacerbation. Seen and examined at bedside, remains on BIPAP      MEDICATIONS  (STANDING):  ALBUTerol/ipratropium for Nebulization 3 milliLiter(s) Nebulizer every 6 hours  aspirin enteric coated 81 milliGRAM(s) Oral daily  atorvastatin 40 milliGRAM(s) Oral at bedtime  buDESOnide 160 MICROgram(s)/formoterol 4.5 MICROgram(s) Inhaler 2 Puff(s) Inhalation two times a day  clopidogrel Tablet 75 milliGRAM(s) Oral daily  dextrose 5%. 1000 milliLiter(s) (50 mL/Hr) IV Continuous <Continuous>  dextrose 50% Injectable 12.5 Gram(s) IV Push once  dextrose 50% Injectable 25 Gram(s) IV Push once  dextrose 50% Injectable 25 Gram(s) IV Push once  enoxaparin Injectable 40 milliGRAM(s) SubCutaneous daily  furosemide   Injectable 40 milliGRAM(s) IV Push daily  insulin lispro (HumaLOG) corrective regimen sliding scale   SubCutaneous three times a day before meals  levoFLOXacin IVPB      levoFLOXacin IVPB 750 milliGRAM(s) IV Intermittent every 24 hours  methylPREDNISolone sodium succinate Injectable 40 milliGRAM(s) IV Push every 8 hours  metoprolol tartrate 12.5 milliGRAM(s) Oral two times a day  multivitamin 1 Tablet(s) Oral daily  tamsulosin 0.4 milliGRAM(s) Oral at bedtime  valsartan 80 milliGRAM(s) Oral daily    MEDICATIONS  (PRN):  dextrose 40% Gel 15 Gram(s) Oral once PRN Blood Glucose LESS THAN 70 milliGRAM(s)/deciliter  glucagon  Injectable 1 milliGRAM(s) IntraMuscular once PRN Glucose LESS THAN 70 milligrams/deciliter      Allergies    No Known Allergies    Intolerances    shellfish (Nausea)      REVIEW OF SYSTEMS:  CONSTITUTIONAL: No fever, weight loss, or fatigue  EYES: No eye pain, visual disturbances, or discharge  ENMT:  No difficulty hearing, tinnitus, vertigo; No sinus or throat pain  NECK: No pain or stiffness  BREASTS: No pain, masses, or nipple discharge  RESPIRATORY: No cough, wheezing, chills or hemoptysis; + shortness of breath  CARDIOVASCULAR: No chest pain, palpitations, dizziness, or leg swelling  GASTROINTESTINAL: No abdominal or epigastric pain. No nausea, vomiting, or hematemesis; No diarrhea or constipation. No melena or hematochezia.  GENITOURINARY: No dysuria, frequency, hematuria, or incontinence  NEUROLOGICAL: No headaches, memory loss, loss of strength, numbness, or tremors  SKIN: No itching, burning, rashes, or lesions   LYMPH NODES: No enlarged glands  ENDOCRINE: No heat or cold intolerance; No hair loss; No polydipsia or polyuria  MUSCULOSKELETAL: No joint pain or swelling; No muscle, back, or extremity pain  HEME/LYMPH: No easy bruising, or bleeding gums  ALLERGY AND IMMUNOLOGIC: No hives or eczema    Vital Signs Last 24 Hrs  T(C): 36.4 (05 Apr 2019 07:39), Max: 36.7 (04 Apr 2019 14:56)  T(F): 97.6 (05 Apr 2019 07:39), Max: 98 (04 Apr 2019 14:56)  HR: 95 (05 Apr 2019 08:57) (69 - 175)  BP: 112/57 (05 Apr 2019 07:39) (91/43 - 140/90)  BP(mean): --  RR: 16 (05 Apr 2019 07:39) (16 - 20)  SpO2: 96% (05 Apr 2019 08:57) (90% - 100%)    PHYSICAL EXAM:  GENERAL: NAD, On BIPAP  HEAD:  Atraumatic, Normocephalic  EYES: EOMI, PERRLA, conjunctiva and sclera clear  ENMT: No tonsillar erythema, exudates, or enlargement; Moist mucous membranes, Good dentition, No lesions  NECK: Supple, No JVD, Normal thyroid  NERVOUS SYSTEM:  Alert & Oriented X3, Good concentration; Motor Strength 5/5 B/L upper and lower extremities  CHEST/LUNG: Decrease  to auscultation bilaterally; + wheezing bilat   HEART: S1S2+, Regular rate and rhythm  ABDOMEN: Soft, Nontender, Nondistended; Bowel sounds present  EXTREMITIES:  2+ Peripheral Pulses, No clubbing, cyanosis, or edema  LYMPH: No lymphadenopathy noted  SKIN: No rashes or lesions    LABS:                        11.9   5.91  )-----------( 260      ( 05 Apr 2019 07:06 )             39.0     05 Apr 2019 07:06    139    |  98     |  15     ----------------------------<  176    5.1     |  35     |  1.10     Ca    9.0        05 Apr 2019 07:06    TPro  6.2    /  Alb  3.3    /  TBili  0.4    /  DBili  x      /  AST  15     /  ALT  34     /  AlkPhos  74     05 Apr 2019 07:06      CAPILLARY BLOOD GLUCOSE      POCT Blood Glucose.: 193 mg/dL (05 Apr 2019 09:06)  POCT Blood Glucose.: 223 mg/dL (04 Apr 2019 22:17)    BLOOD CULTURE    RADIOLOGY & ADDITIONAL TESTS:    Imaging Personally Reviewed:  [ ] YES     Consultant(s) Notes Reviewed:      Care Discussed with Consultants/Other Providers:

## 2019-04-06 LAB
ANION GAP SERPL CALC-SCNC: 6 MMOL/L — SIGNIFICANT CHANGE UP (ref 5–17)
BASE EXCESS BLDA CALC-SCNC: 8.4 MMOL/L — HIGH (ref -2–2)
BLOOD GAS COMMENTS ARTERIAL: SIGNIFICANT CHANGE UP
BUN SERPL-MCNC: 20 MG/DL — SIGNIFICANT CHANGE UP (ref 7–23)
CALCIUM SERPL-MCNC: 9.2 MG/DL — SIGNIFICANT CHANGE UP (ref 8.5–10.1)
CHLORIDE SERPL-SCNC: 94 MMOL/L — LOW (ref 96–108)
CO2 SERPL-SCNC: 37 MMOL/L — HIGH (ref 22–31)
CREAT SERPL-MCNC: 1.3 MG/DL — SIGNIFICANT CHANGE UP (ref 0.5–1.3)
GLUCOSE SERPL-MCNC: 288 MG/DL — HIGH (ref 70–99)
HCO3 BLDA-SCNC: 31 MMOL/L — HIGH (ref 23–27)
HCT VFR BLD CALC: 39.2 % — SIGNIFICANT CHANGE UP (ref 39–50)
HGB BLD-MCNC: 12 G/DL — LOW (ref 13–17)
HOROWITZ INDEX BLDA+IHG-RTO: 28 — SIGNIFICANT CHANGE UP
MCHC RBC-ENTMCNC: 27.8 PG — SIGNIFICANT CHANGE UP (ref 27–34)
MCHC RBC-ENTMCNC: 30.6 GM/DL — LOW (ref 32–36)
MCV RBC AUTO: 90.7 FL — SIGNIFICANT CHANGE UP (ref 80–100)
NRBC # BLD: 0 /100 WBCS — SIGNIFICANT CHANGE UP (ref 0–0)
PCO2 BLDA: 61 MMHG — HIGH (ref 32–46)
PH BLDA: 7.36 — SIGNIFICANT CHANGE UP (ref 7.35–7.45)
PLATELET # BLD AUTO: 263 K/UL — SIGNIFICANT CHANGE UP (ref 150–400)
PO2 BLDA: 85 MMHG — SIGNIFICANT CHANGE UP (ref 74–108)
POTASSIUM SERPL-MCNC: 4.8 MMOL/L — SIGNIFICANT CHANGE UP (ref 3.5–5.3)
POTASSIUM SERPL-SCNC: 4.8 MMOL/L — SIGNIFICANT CHANGE UP (ref 3.5–5.3)
RBC # BLD: 4.32 M/UL — SIGNIFICANT CHANGE UP (ref 4.2–5.8)
RBC # FLD: 13.4 % — SIGNIFICANT CHANGE UP (ref 10.3–14.5)
SAO2 % BLDA: 96 % — SIGNIFICANT CHANGE UP (ref 92–96)
SODIUM SERPL-SCNC: 137 MMOL/L — SIGNIFICANT CHANGE UP (ref 135–145)
WBC # BLD: 11.51 K/UL — HIGH (ref 3.8–10.5)
WBC # FLD AUTO: 11.51 K/UL — HIGH (ref 3.8–10.5)

## 2019-04-06 PROCEDURE — 99233 SBSQ HOSP IP/OBS HIGH 50: CPT

## 2019-04-06 PROCEDURE — 93010 ELECTROCARDIOGRAM REPORT: CPT

## 2019-04-06 RX ADMIN — Medication 40 MILLIGRAM(S): at 18:04

## 2019-04-06 RX ADMIN — Medication 1: at 07:53

## 2019-04-06 RX ADMIN — Medication 40 MILLIGRAM(S): at 13:56

## 2019-04-06 RX ADMIN — BUDESONIDE AND FORMOTEROL FUMARATE DIHYDRATE 2 PUFF(S): 160; 4.5 AEROSOL RESPIRATORY (INHALATION) at 19:46

## 2019-04-06 RX ADMIN — ATORVASTATIN CALCIUM 40 MILLIGRAM(S): 80 TABLET, FILM COATED ORAL at 21:50

## 2019-04-06 RX ADMIN — Medication 3 MILLILITER(S): at 20:08

## 2019-04-06 RX ADMIN — Medication 3 MILLILITER(S): at 14:04

## 2019-04-06 RX ADMIN — Medication 3 MILLILITER(S): at 01:23

## 2019-04-06 RX ADMIN — Medication 40 MILLIGRAM(S): at 06:01

## 2019-04-06 RX ADMIN — Medication 3 MILLILITER(S): at 07:33

## 2019-04-06 RX ADMIN — Medication 1 TABLET(S): at 11:25

## 2019-04-06 RX ADMIN — Medication 3: at 12:04

## 2019-04-06 RX ADMIN — Medication 81 MILLIGRAM(S): at 11:25

## 2019-04-06 RX ADMIN — TAMSULOSIN HYDROCHLORIDE 0.4 MILLIGRAM(S): 0.4 CAPSULE ORAL at 21:50

## 2019-04-06 RX ADMIN — Medication 5: at 16:47

## 2019-04-06 RX ADMIN — CLOPIDOGREL BISULFATE 75 MILLIGRAM(S): 75 TABLET, FILM COATED ORAL at 11:25

## 2019-04-06 RX ADMIN — Medication 2: at 21:50

## 2019-04-06 RX ADMIN — Medication 12.5 MILLIGRAM(S): at 18:04

## 2019-04-06 RX ADMIN — ENOXAPARIN SODIUM 40 MILLIGRAM(S): 100 INJECTION SUBCUTANEOUS at 11:25

## 2019-04-06 NOTE — PHARMACOTHERAPY INTERVENTION NOTE - COMMENTS
Patient to receive day 3 of IV levofloxacin tonight for HCAP treatment. Currently tolerating PO (jump in WBC count possibly d/t steroids). Spoke with physician to recommend changing to PO levofloxacin and add a stop date. Levofloxacin 750mg daily x3 more doses ordered

## 2019-04-06 NOTE — PROGRESS NOTE ADULT - SUBJECTIVE AND OBJECTIVE BOX
Date/Time Patient Seen:  		  Referring MD:   Data Reviewed	       Patient is a 78y old  Male who presents with a chief complaint of SOB (05 Apr 2019 11:01)      Subjective/HPI     PAST MEDICAL & SURGICAL HISTORY:  PVD (peripheral vascular disease)  Hypertension  Diabetes mellitus  COPD (chronic obstructive pulmonary disease)  Osteomyelitis  Asymptomatic Peripheral Vascular Disease  Dyslipidemia  CAD (Coronary Artery Disease)  Diabetes Mellitus, Type II  Compound fracture: left leg  CABG (Coronary Artery Bypass Graft)        Medication list         MEDICATIONS  (STANDING):  ALBUTerol/ipratropium for Nebulization 3 milliLiter(s) Nebulizer every 6 hours  aspirin enteric coated 81 milliGRAM(s) Oral daily  atorvastatin 40 milliGRAM(s) Oral at bedtime  buDESOnide 160 MICROgram(s)/formoterol 4.5 MICROgram(s) Inhaler 2 Puff(s) Inhalation two times a day  clopidogrel Tablet 75 milliGRAM(s) Oral daily  dextrose 5%. 1000 milliLiter(s) (50 mL/Hr) IV Continuous <Continuous>  dextrose 50% Injectable 12.5 Gram(s) IV Push once  dextrose 50% Injectable 25 Gram(s) IV Push once  dextrose 50% Injectable 25 Gram(s) IV Push once  enoxaparin Injectable 40 milliGRAM(s) SubCutaneous daily  furosemide   Injectable 40 milliGRAM(s) IV Push daily  insulin lispro (HumaLOG) corrective regimen sliding scale   SubCutaneous at bedtime  insulin lispro (HumaLOG) corrective regimen sliding scale   SubCutaneous three times a day before meals  levoFLOXacin IVPB      levoFLOXacin IVPB 750 milliGRAM(s) IV Intermittent every 24 hours  methylPREDNISolone sodium succinate Injectable 40 milliGRAM(s) IV Push every 8 hours  metoprolol tartrate 12.5 milliGRAM(s) Oral two times a day  multivitamin 1 Tablet(s) Oral daily  tamsulosin 0.4 milliGRAM(s) Oral at bedtime  valsartan 80 milliGRAM(s) Oral daily    MEDICATIONS  (PRN):  dextrose 40% Gel 15 Gram(s) Oral once PRN Blood Glucose LESS THAN 70 milliGRAM(s)/deciliter  glucagon  Injectable 1 milliGRAM(s) IntraMuscular once PRN Glucose LESS THAN 70 milligrams/deciliter         Vitals log        ICU Vital Signs Last 24 Hrs  T(C): 36.4 (06 Apr 2019 04:44), Max: 36.8 (05 Apr 2019 17:03)  T(F): 97.5 (06 Apr 2019 04:44), Max: 98.3 (05 Apr 2019 17:36)  HR: 103 (06 Apr 2019 04:44) (69 - 103)  BP: 103/70 (06 Apr 2019 04:44) (102/58 - 122/60)  BP(mean): --  ABP: --  ABP(mean): --  RR: 18 (06 Apr 2019 04:44) (16 - 20)  SpO2: 96% (06 Apr 2019 04:44) (96% - 100%)           Input and Output:  I&O's Detail    04 Apr 2019 07:01  -  05 Apr 2019 07:00  --------------------------------------------------------  IN:  Total IN: 0 mL    OUT:    Voided: 600 mL  Total OUT: 600 mL    Total NET: -600 mL          Lab Data                        11.9   5.91  )-----------( 260      ( 05 Apr 2019 07:06 )             39.0     04-05    139  |  98  |  15  ----------------------------<  176<H>  5.1   |  35<H>  |  1.10    Ca    9.0      05 Apr 2019 07:06    TPro  6.2  /  Alb  3.3  /  TBili  0.4  /  DBili  x   /  AST  15  /  ALT  34  /  AlkPhos  74  04-05    ABG - ( 05 Apr 2019 06:39 )  pH, Arterial: 7.31  pH, Blood: x     /  pCO2: 67    /  pO2: 136   / HCO3: 29    / Base Excess: 6.5   /  SaO2: 99                CARDIAC MARKERS ( 04 Apr 2019 19:58 )  <.015 ng/mL / x     / 179 U/L / x     / 4.7 ng/mL        Review of Systems	      Objective     Physical Examination    heart s1s2  lung dec BS  abd soft      Pertinent Lab findings & Imaging      Eliecer:  NO   Adequate UO     I&O's Detail    04 Apr 2019 07:01  -  05 Apr 2019 07:00  --------------------------------------------------------  IN:  Total IN: 0 mL    OUT:    Voided: 600 mL  Total OUT: 600 mL    Total NET: -600 mL               Discussed with:     Cultures:	        Radiology

## 2019-04-06 NOTE — PROGRESS NOTE ADULT - SUBJECTIVE AND OBJECTIVE BOX
[facility]MOD[facility] P    ALLERGY Shellfish  [facility]     Initial evaluation/Pulmonary Critical Care consultation requested on 4/4/2019   by Dr Dawson    from Dr Dejesus   Patient examined chart reviewed    HOSPITAL ADMISSION  4/4/2019 [facility] JACK THOMPSON  PATIENT CAME  FROM (if information available)        TYPE OF VISIT       Subsequent Pulmonary followup       REASON FOR VISIT  Please see problem list                 VITALS/LABS PATIENT  YUE 4/4/2019 ADMISSION [facility] DR MAYNOR THOMPSON        4/6/2019 afeb 107 130/60 18   4/6/2019 W 11.5 Hb 12 Plt 263 Na 137 K 4.8 CO2 37 Cr 1.3   4/6/2019 736/61/85 2l   4/5 v duplex legs n     REVIEW OF SYMPTOMS    Able to give ROS  Yes     RELIABLE No   CONSTITUTIONAL Weakness Yes  Chills No Vision changes No  ENDOCRINE No unexplained hair loss No heat or cold intolerance    ALLERGY No hives  Sore throat No   RESP Coughing blood no  Shortness of breath YES   NEURO No Headache  Confusion Pain neck No   CARDIAC No Chest pain No Palpitations   GI No Pain abdomen NO   Vomiting NO     PHYSICAL EXAM    HEENT Unremarkable PERRLA atraumatic   RESP Fair air entry EXP prolonged    Harsh breath sound Resp distres mild   CARDIAC S1 S2 No S3     NO JVD    ABDOMEN SOFT BS PRESENT NOT DISTENDED No hepatosplenomegaly PEDAL EDEMA present No calf tenderness  NO rash   GENERAL Not TOXIC looking

## 2019-04-06 NOTE — PROGRESS NOTE ADULT - SUBJECTIVE AND OBJECTIVE BOX
Patient is a 78y old  Male who presents with a chief complaint of  SOB    INTERVAL HPI/OVERNIGHT EVENTS: 77 yo male with PMHx of HTN, DM2 (on home Metformin and glipizide), COPD (2L home/ BIPAP at night), CAD with 3v CABG 2004, HLD, hx hypercapnic resp failure with recent intubation c/b with cardiac arrest (6/2018) being admitted for hypercapnic respiratory failure 2/2 acute COPD exacerbation. Seen and examined. Doing well today. Feeling better. Denies SOB, palpitations, chest pain. No N/V/D.     ROS: As above    MEDICATIONS  (STANDING):  ALBUTerol/ipratropium for Nebulization 3 milliLiter(s) Nebulizer every 6 hours  aspirin enteric coated 81 milliGRAM(s) Oral daily  atorvastatin 40 milliGRAM(s) Oral at bedtime  buDESOnide 160 MICROgram(s)/formoterol 4.5 MICROgram(s) Inhaler 2 Puff(s) Inhalation two times a day  clopidogrel Tablet 75 milliGRAM(s) Oral daily  dextrose 5%. 1000 milliLiter(s) (50 mL/Hr) IV Continuous <Continuous>  dextrose 50% Injectable 12.5 Gram(s) IV Push once  dextrose 50% Injectable 25 Gram(s) IV Push once  dextrose 50% Injectable 25 Gram(s) IV Push once  enoxaparin Injectable 40 milliGRAM(s) SubCutaneous daily  furosemide   Injectable 40 milliGRAM(s) IV Push daily  insulin lispro (HumaLOG) corrective regimen sliding scale   SubCutaneous at bedtime  insulin lispro (HumaLOG) corrective regimen sliding scale   SubCutaneous three times a day before meals  levoFLOXacin IVPB      levoFLOXacin IVPB 750 milliGRAM(s) IV Intermittent every 24 hours  methylPREDNISolone sodium succinate Injectable 40 milliGRAM(s) IV Push every 12 hours  metoprolol tartrate 12.5 milliGRAM(s) Oral two times a day  multivitamin 1 Tablet(s) Oral daily  tamsulosin 0.4 milliGRAM(s) Oral at bedtime  valsartan 80 milliGRAM(s) Oral daily    MEDICATIONS  (PRN):  dextrose 40% Gel 15 Gram(s) Oral once PRN Blood Glucose LESS THAN 70 milliGRAM(s)/deciliter  glucagon  Injectable 1 milliGRAM(s) IntraMuscular once PRN Glucose LESS THAN 70 milligrams/deciliter    Allergies    No Known Allergies    Intolerances    shellfish (Nausea)    Vital Signs Last 24 Hrs  T(C): 36.4 (06 Apr 2019 13:03), Max: 36.8 (05 Apr 2019 17:03)  T(F): 97.6 (06 Apr 2019 13:03), Max: 98.3 (05 Apr 2019 17:36)  HR: 107 (06 Apr 2019 13:03) (81 - 107)  BP: 110/71 (06 Apr 2019 13:03) (102/58 - 119/53)  BP(mean): --  RR: 18 (06 Apr 2019 13:03) (18 - 20)  SpO2: 91% (06 Apr 2019 13:03) (91% - 100%)    PHYSICAL EXAM:  GENERAL: NAD, On BiPAP  HEAD:  Atraumatic, Normocephalic  EYES: EOMI, PERRLA, conjunctiva and sclera clear  ENMT: No tonsillar erythema, exudates, or enlargement; Moist mucous membranes, Good dentition, No lesions  NECK: Supple, No JVD, Normal thyroid  NERVOUS SYSTEM:  Alert & Oriented X3, Good concentration; no focal deficits.  CHEST/LUNG: Decreased BS bilaterally, +expiratory wheeze diffusely.  HEART: S1S2+, Regular rate and rhythm  ABDOMEN: Soft, Nontender, Nondistended; Bowel sounds present  EXTREMITIES:  2+ Peripheral Pulses, No clubbing, cyanosis, or edema  LYMPH: No lymphadenopathy noted  SKIN: Warm, dry, intact    LABS:                         12.0   11.51 )-----------( 263      ( 06 Apr 2019 09:44 )             39.2   04-06    137  |  94<L>  |  20  ----------------------------<  288<H>  4.8   |  37<H>  |  1.30    Ca    9.2      06 Apr 2019 09:44    TPro  6.2  /  Alb  3.3  /  TBili  0.4  /  DBili  x   /  AST  15  /  ALT  34  /  AlkPhos  74  04-05    CAPILLARY BLOOD GLUCOSE  POCT Blood Glucose.: 274 mg/dL (06 Apr 2019 11:45)  POCT Blood Glucose.: 194 mg/dL (06 Apr 2019 07:36)  POCT Blood Glucose.: 245 mg/dL (05 Apr 2019 23:28)  POCT Blood Glucose.: 170 mg/dL (05 Apr 2019 17:33)    BLOOD CULTURE    RADIOLOGY & ADDITIONAL TESTS:    Imaging Personally Reviewed:  [ ] YES     Consultant(s) Notes Reviewed:      Care Discussed with Consultants/Other Providers:

## 2019-04-06 NOTE — PROGRESS NOTE ADULT - PROBLEM SELECTOR PLAN 2
- likely 2/2 to respiratory distress  - EKG showing SVT rather than Afib, patient is s/p cardizem drip, with rate 80s to 100s.  - cardio, Dr. Calabrese, follow up recs

## 2019-04-06 NOTE — CONSULT NOTE ADULT - SUBJECTIVE AND OBJECTIVE BOX
CARDIOLOGY CONSULT NOTE    Patient is a 78y Male with a known history of :  SVT (supraventricular tachycardia) (I47.1)  Acute hypercapnic respiratory failure (J96.02)  Goals of care, counseling/discussion (Z71.89)  Respiratory distress (R06.03)  BPH (benign prostatic hyperplasia) (N40.0)  Edema of lower extremity (R60.0)  Tachycardia (R00.0)  Need for prophylactic measure (Z29.9)  PVD (peripheral vascular disease) (I73.9)  Dyslipidemia (E78.5)  Diabetes Mellitus, Type II (E11.9)  CAD (Coronary Artery Disease) (I25.10)  Atrial fibrillation with rapid ventricular response (I48.91)  COPD exacerbation (J44.1)    HPI:  77 yo male with PMHx of HTN, DM2 (on home Metformin and glipizide), COPD (2L home/ BIPAP at night), CAD with 3v CABG 2004, HLD, hx hypercapnic resp failure with recent intubation c/b with cardiac arrest (6/2018), presenting to the ED for SOB and confusion starting today. Patient's wife states that this morning he had increased work of breathing. She took his vital signs. His BP, HR were fine but she noted his O2 saturation was in the high-80s-low 90s on NC. She observed that he was taking longer than usual to answer her questions and called 911. Patient denies headache, chest pain, abdominal pain, N/V/D, body aches. No sick contacts or recent travel. Has pitting edema in bilateral LE for several months.  Was admitted to [facility] for acute on chronic hypercapnic respiratory failure 2/2 COPD exacerbation, treated with azithromycin and solumedrol.  Had a TTE, EF of 55 percent, grade 1 diastolic dysfunction.    In ED T 98  Afib  then came down to 107 after cardizem 10 mg IV  BP 91/43 then 128/76 RR 18 O2 sat 97 on RA  Labs significant for serum CO2 34, glucose 170  ABG pH 7.28 pCO2 76 pO2 216 HCO3 30 Base excess 8.1  CXR No focal consolidation or pleural effusion.  12 lead EKG shows   Received albuterol x1, duonebs x1, budesonide x1, cardizem 10 mg IV x1, Magnesium sulfate 2 g IV x1, solumedrol 125 mg IV x1 (04 Apr 2019 18:12)      REVIEW OF SYSTEMS:    CONSTITUTIONAL: No fever, weight loss, or fatigue  EYES: No eye pain, visual disturbances, or discharge  ENMT:  No difficulty hearing, tinnitus, vertigo; No sinus or throat pain  NECK: No pain or stiffness  BREASTS: No pain, masses, or nipple discharge  RESPIRATORY: No cough, wheezing, chills or hemoptysis; No shortness of breath  CARDIOVASCULAR: No chest pain, palpitations, dizziness, or leg swelling  GASTROINTESTINAL: No abdominal or epigastric pain. No nausea, vomiting, or hematemesis; No diarrhea or constipation. No melena or hematochezia.  GENITOURINARY: No dysuria, frequency, hematuria, or incontinence  NEUROLOGICAL: No headaches, memory loss, loss of strength, numbness, or tremors  SKIN: No itching, burning, rashes, or lesions   LYMPH NODES: No enlarged glands  ENDOCRINE: No heat or cold intolerance; No hair loss  MUSCULOSKELETAL: No joint pain or swelling; No muscle, back, or extremity pain  PSYCHIATRIC: No depression, anxiety, mood swings, or difficulty sleeping  HEME/LYMPH: No easy bruising, or bleeding gums  ALLERGY AND IMMUNOLOGIC: No hives or eczema    MEDICATIONS  (STANDING):  ALBUTerol/ipratropium for Nebulization 3 milliLiter(s) Nebulizer every 6 hours  aspirin enteric coated 81 milliGRAM(s) Oral daily  atorvastatin 40 milliGRAM(s) Oral at bedtime  buDESOnide 160 MICROgram(s)/formoterol 4.5 MICROgram(s) Inhaler 2 Puff(s) Inhalation two times a day  clopidogrel Tablet 75 milliGRAM(s) Oral daily  dextrose 5%. 1000 milliLiter(s) (50 mL/Hr) IV Continuous <Continuous>  dextrose 50% Injectable 12.5 Gram(s) IV Push once  dextrose 50% Injectable 25 Gram(s) IV Push once  dextrose 50% Injectable 25 Gram(s) IV Push once  enoxaparin Injectable 40 milliGRAM(s) SubCutaneous daily  furosemide   Injectable 40 milliGRAM(s) IV Push daily  insulin lispro (HumaLOG) corrective regimen sliding scale   SubCutaneous at bedtime  insulin lispro (HumaLOG) corrective regimen sliding scale   SubCutaneous three times a day before meals  levoFLOXacin IVPB      levoFLOXacin IVPB 750 milliGRAM(s) IV Intermittent every 24 hours  methylPREDNISolone sodium succinate Injectable 40 milliGRAM(s) IV Push every 12 hours  metoprolol tartrate 12.5 milliGRAM(s) Oral two times a day  multivitamin 1 Tablet(s) Oral daily  tamsulosin 0.4 milliGRAM(s) Oral at bedtime  valsartan 80 milliGRAM(s) Oral daily    MEDICATIONS  (PRN):  dextrose 40% Gel 15 Gram(s) Oral once PRN Blood Glucose LESS THAN 70 milliGRAM(s)/deciliter  glucagon  Injectable 1 milliGRAM(s) IntraMuscular once PRN Glucose LESS THAN 70 milligrams/deciliter      ALLERGIES: No Known Allergies      FAMILY HISTORY:  Family history of diabetes mellitus (Sibling)      Social History:  Alochol:   Smoking:   Drug Use:   Marital Status:     I&O's Detail    06 Apr 2019 07:01  -  06 Apr 2019 16:28  --------------------------------------------------------  IN:  Total IN: 0 mL    OUT:    Voided: 900 mL  Total OUT: 900 mL    Total NET: -900 mL          PHYSICAL EXAMINATION:  -----------------------------  T(C): 36.4 (04-06-19 @ 13:03), Max: 36.8 (04-05-19 @ 17:03)  HR: 107 (04-06-19 @ 13:03) (81 - 107)  BP: 110/71 (04-06-19 @ 13:03) (102/58 - 119/53)  RR: 18 (04-06-19 @ 13:03) (18 - 20)  SpO2: 91% (04-06-19 @ 13:03) (91% - 100%)  Wt(kg): --    04-06 @ 07:01  -  04-06 @ 16:28  --------------------------------------------------------  IN:  Total IN: 0 mL    OUT:    Voided: 900 mL  Total OUT: 900 mL    Total NET: -900 mL            Constitutional: well developed, normal appearance, well groomed, well nourished, no deformities and no acute distress.   Eyes: the conjunctiva exhibited no abnormalities and the eyelids demonstrated no xanthelasmas.   HEENT: normal oral mucosa, no oral pallor and no oral cyanosis.   Neck: normal jugular venous A waves present, normal jugular venous V waves present and no jugular venous cummins A waves.   Pulmonary: no respiratory distress, normal respiratory rhythm and effort, no accessory muscle use and lungs were clear to auscultation bilaterally.   Cardiovascular: heart rate and rhythm were normal, normal S1 and S2 and no murmur, gallop, rub, heave or thrill are present.   Musculoskeletal: the gait could not be assessed.   Extremities: no clubbing of the fingernails, no localized cyanosis, no petechial hemorrhages and no ischemic changes.   Skin: normal skin color and pigmentation, no rash, no venous stasis, no skin lesions, no skin ulcer and no xanthoma was observed.   Psychiatric: oriented to person, place, and time, the affect was normal, the mood was normal and not feeling anxious.     LABS:   --------  04-06    137  |  94<L>  |  20  ----------------------------<  288<H>  4.8   |  37<H>  |  1.30    Ca    9.2      06 Apr 2019 09:44    TPro  6.2  /  Alb  3.3  /  TBili  0.4  /  DBili  x   /  AST  15  /  ALT  34  /  AlkPhos  74  04-05                         12.0   11.51 )-----------( 263      ( 06 Apr 2019 09:44 )             39.2         04-04 @ 19:58 CPK total:--, CKMB --, Troponin I - <.015 ng/mL [.015 - .045]      Culture Results:   No growth to date. (04-04 @ 21:09)  Culture Results:   No growth to date. (04-04 @ 21:09)    04-04 @ 21:09    Organism --   Gram Stain Blood -- Gram Stain --  Specimen Source .Blood Blood  Culture-Blood --        RADIOLOGY:  -----------------    < from: TTE Echo Doppler w/o Cont (03.12.19 @ 20:42) >     EXAM:  ECHO TTE WO CON COMP W DOPPLR         PROCEDURE DATE:  03/12/2019        INTERPRETATION:  Ordering Physician: MARTÍN CARRASQUILLO [id number]    Indication: Shortness of breath    Study Quality: Technically difficult   A complete echocardiographic study was performed utilizing standard   protocol including spectral and color Doppler in all echocardiographic   windows.    Height: 175 cm  Weight: 113 kg  BSA: 2.2 sq m  Blood Pressure: 103/61    MEASUREMENTS  IVS: 1.5cm  PWT: 1.1cm  LA: 3.1cm  AO: 3.3cm  LVIDd: 4.2cm  LVIDs: 3.3cm    RVSP: 29mmHg    FINDINGS  Left Ventricle: The endocardium is not well-visualized. In limited views,   the LV function appears to be preserved with an estimated EF of 55%.   There is paradoxical motion of the septum in the setting of prior cardiac   surgery.  Aortic Valve: Calcified aortic valve with normal opening. Trace aortic   regurgitation  Mitral Valve: Thickened and calcified mitral valve leaflets with trace to   mild mitral regurgitation  Tricuspid Valve: Grossly normal tricuspid valve. Mild tricuspid   regurgitation.  Pulmonic Valve: Not well-visualized.  Left Atrium: Grossly normal. An interatrial septal aneurysm is noted   without obvious color flow across it  Right Ventricle: In limited views, grossly normal RV size.  Right Atrium: Grossly normal  Diastolic Function: Doppler evidence of rate 1 diastolic dysfunction  Pericardium/Pleura: No significant pericardial effusion.  Hemodynamics: The pulmonary artery systolic pressure is estimated to be   29 mmHg, assuming the right atrial pressure is equal to 8 mmHg,   consistent with normal pulmonary pressures.    CONCLUSIONS:  1. Technically difficult and limited study  2. The endocardium is not well-visualized. In limited views, the LV   function appears to be preserved with an estimated EF of 55%. There is   paradoxical motion of the septum in the setting of prior cardiac surgery.  3. Calcified aortic valve with normal opening. Trace aortic regurgitation  4. Thickened and calcified mitral valve leaflets with trace to mild   mitral regurgitation  5. Doppler evidence of rate 1 diastolic dysfunction  6. The interatrial septum appears aneurysmal  7. No significant pericardial effusion.                      REJI VILA   This document has been electronically signed. 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

## 2019-04-06 NOTE — CONSULT NOTE ADULT - ASSESSMENT
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
VITALS/LABS PATIENT COMMODALAN TURNER 4/4/2019 ADMISSION [facility] DR MAYNOR THOMPSON        4/4/2019 w 7 Hb 13 Plt 280 Na 140 K 4.4 CO2 34 Cr 1.1     MEDICATIONS  PATIENT COMMODALAN TURNER 4/4/2019 ADMISSION [facility] DR MAYNOR THOMPSON            CAD  ASA 81 (4/4)   Plavix 75 (4/4)   Metoprolol 12.5x2 (4/4)     DVT P   Lvnx 40 94/4)     ANTIBIOTICS   Levaquin 750 (4/4)     DM  iss (4/4)     COPD   Duoneb.4 (4/4)   Symbicort 160 (4/4)   Solumed 40.3 (4/4)     CHF DIAST  Lasix 40 (4/4)   valsartan 80 (4/4)     BPH   Tamsulosin .4 (4/4)     BRIEF PATIENT  DESCRIPTION  PATIENT COMMODALAN TURNER 4/4/2019 ADMISSION [facility] DR MAYNOR THOMPSON       78 m with HO advanced COPD recurrent hospital admissions GOLD stage D admitted 3/11/2019 with COPD ex ac on chr combined resp failure   PMH HTN, DM2 (on home Metformin and glipizide), COPD (2L home/ BIPAP at night), CAD with 3v CABG 2004, HLD, 10/26/2018 ECHO ef 55% hx hypercapnic resp failure with recent intubation c/b with cardiac arrest (12/2018) admitted [facility] 4/4/2019 for hypercapnic respiratory failure 2/2 acute COPD exacerbation.      PROBLEM  LIST PATIENT COMMODALAN TURNER 4/4/2019 ADMISSION [facility] DR MAYNOR THOMPSON        COMBINED HYPERCAPNIC HYPOXIC RESP FAILURE   4/4/2019 4p bpap 10/5/.5 728/76/216     RESP TRACT INFECTION  4/4/2019 w 7  4/4/2019 Pc n   4/4/2019 fluab n rsv n   4/4/2019 CXR no focal consolidations     COPD     RULE OUT MI   4/4/2019 Tr 1 n     DIASTOLIC CHF  3/12/2019 echo ef 55% dd1     ASSESSMENT RECOMMENDATIONS PATIENT COMMODALAN TURNER 4/4/2019 ADMISSION [facility] DR MAYNOR THOMPSON                   COMBINED HYPERCAPNIC HYPOXIC RESP FAILURE   4/4/2019  8:46 PM REPEAT abg stat ordered to make sure he does not need to be placed on mech vent invasive type     RESP TRACT INFECTION  Agree with bs abio However with low procal bact infection is unlikely     COPD   Cont bs steroids Increased symbicort to 160 (4/4)     RULE OUT MI   MI unlikely check serial enz     DIASTOLIC CHF  On lasix and arb Monitor volume statu    TIME SPENT Over 55 minutes aggregate care time spent on encounter; activities included   direct patient care, counseling and/or coordinating care reviewing notes, lab data/ imaging , discussion with multidisciplinary team/ patient  /family. Risks, benefits, alternatives  discussed in detail
77 y/o M with a h/o COPD (on 2L home O2/BiPAP qHS), CAD (s/p CABG), HTN, HLD, DM, with acute hypercapnic respiratory failure, acute COPD exacerbation, SVT.    Patient does not require admission to an ICU setting at this point in time. On reassessment after earlier adjustments in NIPPV settings, he is more comfortable and his blood gas has improved.    Case discussed in detail with eICU attending, Dr. Roberts.

## 2019-04-07 ENCOUNTER — TRANSCRIPTION ENCOUNTER (OUTPATIENT)
Age: 79
End: 2019-04-07

## 2019-04-07 VITALS
DIASTOLIC BLOOD PRESSURE: 64 MMHG | TEMPERATURE: 98 F | RESPIRATION RATE: 16 BRPM | OXYGEN SATURATION: 94 % | SYSTOLIC BLOOD PRESSURE: 114 MMHG | HEART RATE: 93 BPM

## 2019-04-07 LAB
ANION GAP SERPL CALC-SCNC: 4 MMOL/L — LOW (ref 5–17)
BUN SERPL-MCNC: 24 MG/DL — HIGH (ref 7–23)
CALCIUM SERPL-MCNC: 9.1 MG/DL — SIGNIFICANT CHANGE UP (ref 8.5–10.1)
CHLORIDE SERPL-SCNC: 97 MMOL/L — SIGNIFICANT CHANGE UP (ref 96–108)
CO2 SERPL-SCNC: 38 MMOL/L — HIGH (ref 22–31)
CREAT SERPL-MCNC: 1.1 MG/DL — SIGNIFICANT CHANGE UP (ref 0.5–1.3)
GLUCOSE SERPL-MCNC: 257 MG/DL — HIGH (ref 70–99)
HCT VFR BLD CALC: 40 % — SIGNIFICANT CHANGE UP (ref 39–50)
HGB BLD-MCNC: 12.4 G/DL — LOW (ref 13–17)
MCHC RBC-ENTMCNC: 28.1 PG — SIGNIFICANT CHANGE UP (ref 27–34)
MCHC RBC-ENTMCNC: 31 GM/DL — LOW (ref 32–36)
MCV RBC AUTO: 90.5 FL — SIGNIFICANT CHANGE UP (ref 80–100)
NRBC # BLD: 0 /100 WBCS — SIGNIFICANT CHANGE UP (ref 0–0)
PLATELET # BLD AUTO: 252 K/UL — SIGNIFICANT CHANGE UP (ref 150–400)
POTASSIUM SERPL-MCNC: 4.2 MMOL/L — SIGNIFICANT CHANGE UP (ref 3.5–5.3)
POTASSIUM SERPL-SCNC: 4.2 MMOL/L — SIGNIFICANT CHANGE UP (ref 3.5–5.3)
RBC # BLD: 4.42 M/UL — SIGNIFICANT CHANGE UP (ref 4.2–5.8)
RBC # FLD: 13.2 % — SIGNIFICANT CHANGE UP (ref 10.3–14.5)
SODIUM SERPL-SCNC: 139 MMOL/L — SIGNIFICANT CHANGE UP (ref 135–145)
WBC # BLD: 9.93 K/UL — SIGNIFICANT CHANGE UP (ref 3.8–10.5)
WBC # FLD AUTO: 9.93 K/UL — SIGNIFICANT CHANGE UP (ref 3.8–10.5)

## 2019-04-07 PROCEDURE — 99239 HOSP IP/OBS DSCHRG MGMT >30: CPT

## 2019-04-07 PROCEDURE — 94660 CPAP INITIATION&MGMT: CPT

## 2019-04-07 PROCEDURE — 87631 RESP VIRUS 3-5 TARGETS: CPT

## 2019-04-07 PROCEDURE — 83605 ASSAY OF LACTIC ACID: CPT

## 2019-04-07 PROCEDURE — 36415 COLL VENOUS BLD VENIPUNCTURE: CPT

## 2019-04-07 PROCEDURE — 80048 BASIC METABOLIC PNL TOTAL CA: CPT

## 2019-04-07 PROCEDURE — 87633 RESP VIRUS 12-25 TARGETS: CPT

## 2019-04-07 PROCEDURE — 87486 CHLMYD PNEUM DNA AMP PROBE: CPT

## 2019-04-07 PROCEDURE — 36600 WITHDRAWAL OF ARTERIAL BLOOD: CPT

## 2019-04-07 PROCEDURE — 99291 CRITICAL CARE FIRST HOUR: CPT

## 2019-04-07 PROCEDURE — 82550 ASSAY OF CK (CPK): CPT

## 2019-04-07 PROCEDURE — 82962 GLUCOSE BLOOD TEST: CPT

## 2019-04-07 PROCEDURE — 82803 BLOOD GASES ANY COMBINATION: CPT

## 2019-04-07 PROCEDURE — 96375 TX/PRO/DX INJ NEW DRUG ADDON: CPT

## 2019-04-07 PROCEDURE — 96374 THER/PROPH/DIAG INJ IV PUSH: CPT

## 2019-04-07 PROCEDURE — 71045 X-RAY EXAM CHEST 1 VIEW: CPT

## 2019-04-07 PROCEDURE — 93005 ELECTROCARDIOGRAM TRACING: CPT

## 2019-04-07 PROCEDURE — 84145 PROCALCITONIN (PCT): CPT

## 2019-04-07 PROCEDURE — 94760 N-INVAS EAR/PLS OXIMETRY 1: CPT

## 2019-04-07 PROCEDURE — 82553 CREATINE MB FRACTION: CPT

## 2019-04-07 PROCEDURE — 87040 BLOOD CULTURE FOR BACTERIA: CPT

## 2019-04-07 PROCEDURE — 80053 COMPREHEN METABOLIC PANEL: CPT

## 2019-04-07 PROCEDURE — 87581 M.PNEUMON DNA AMP PROBE: CPT

## 2019-04-07 PROCEDURE — 93970 EXTREMITY STUDY: CPT

## 2019-04-07 PROCEDURE — 84484 ASSAY OF TROPONIN QUANT: CPT

## 2019-04-07 PROCEDURE — 94640 AIRWAY INHALATION TREATMENT: CPT

## 2019-04-07 PROCEDURE — 87449 NOS EACH ORGANISM AG IA: CPT

## 2019-04-07 PROCEDURE — 87798 DETECT AGENT NOS DNA AMP: CPT

## 2019-04-07 PROCEDURE — 83880 ASSAY OF NATRIURETIC PEPTIDE: CPT

## 2019-04-07 PROCEDURE — 85027 COMPLETE CBC AUTOMATED: CPT

## 2019-04-07 RX ORDER — CIPROFLOXACIN LACTATE 400MG/40ML
1 VIAL (ML) INTRAVENOUS
Qty: 4 | Refills: 0 | OUTPATIENT
Start: 2019-04-07

## 2019-04-07 RX ADMIN — Medication 3 MILLILITER(S): at 01:41

## 2019-04-07 RX ADMIN — Medication 2: at 07:57

## 2019-04-07 RX ADMIN — BUDESONIDE AND FORMOTEROL FUMARATE DIHYDRATE 2 PUFF(S): 160; 4.5 AEROSOL RESPIRATORY (INHALATION) at 06:31

## 2019-04-07 RX ADMIN — Medication 3 MILLILITER(S): at 07:46

## 2019-04-07 RX ADMIN — Medication 5: at 11:40

## 2019-04-07 RX ADMIN — CLOPIDOGREL BISULFATE 75 MILLIGRAM(S): 75 TABLET, FILM COATED ORAL at 11:39

## 2019-04-07 RX ADMIN — Medication 81 MILLIGRAM(S): at 11:39

## 2019-04-07 RX ADMIN — Medication 12.5 MILLIGRAM(S): at 06:35

## 2019-04-07 RX ADMIN — Medication 3 MILLILITER(S): at 13:39

## 2019-04-07 RX ADMIN — VALSARTAN 80 MILLIGRAM(S): 80 TABLET ORAL at 06:31

## 2019-04-07 RX ADMIN — ENOXAPARIN SODIUM 40 MILLIGRAM(S): 100 INJECTION SUBCUTANEOUS at 11:39

## 2019-04-07 RX ADMIN — Medication 1 TABLET(S): at 11:39

## 2019-04-07 RX ADMIN — Medication 40 MILLIGRAM(S): at 06:31

## 2019-04-07 NOTE — PROGRESS NOTE ADULT - SUBJECTIVE AND OBJECTIVE BOX
Date/Time Patient Seen:  		  Referring MD:   Data Reviewed	       Patient is a 78y old  Male who presents with a chief complaint of SOB (06 Apr 2019 15:12)      Subjective/HPI     PAST MEDICAL & SURGICAL HISTORY:  PVD (peripheral vascular disease)  Hypertension  Diabetes mellitus  COPD (chronic obstructive pulmonary disease)  Osteomyelitis  Asymptomatic Peripheral Vascular Disease  Dyslipidemia  CAD (Coronary Artery Disease)  Diabetes Mellitus, Type II  Compound fracture: left leg  CABG (Coronary Artery Bypass Graft)        Medication list         MEDICATIONS  (STANDING):  ALBUTerol/ipratropium for Nebulization 3 milliLiter(s) Nebulizer every 6 hours  aspirin enteric coated 81 milliGRAM(s) Oral daily  atorvastatin 40 milliGRAM(s) Oral at bedtime  buDESOnide 160 MICROgram(s)/formoterol 4.5 MICROgram(s) Inhaler 2 Puff(s) Inhalation two times a day  clopidogrel Tablet 75 milliGRAM(s) Oral daily  dextrose 5%. 1000 milliLiter(s) (50 mL/Hr) IV Continuous <Continuous>  dextrose 50% Injectable 12.5 Gram(s) IV Push once  dextrose 50% Injectable 25 Gram(s) IV Push once  dextrose 50% Injectable 25 Gram(s) IV Push once  enoxaparin Injectable 40 milliGRAM(s) SubCutaneous daily  insulin lispro (HumaLOG) corrective regimen sliding scale   SubCutaneous at bedtime  insulin lispro (HumaLOG) corrective regimen sliding scale   SubCutaneous three times a day before meals  levoFLOXacin  Tablet 750 milliGRAM(s) Oral every 24 hours  methylPREDNISolone sodium succinate Injectable 40 milliGRAM(s) IV Push every 12 hours  metoprolol tartrate 12.5 milliGRAM(s) Oral two times a day  multivitamin 1 Tablet(s) Oral daily  tamsulosin 0.4 milliGRAM(s) Oral at bedtime  valsartan 80 milliGRAM(s) Oral daily    MEDICATIONS  (PRN):  dextrose 40% Gel 15 Gram(s) Oral once PRN Blood Glucose LESS THAN 70 milliGRAM(s)/deciliter  glucagon  Injectable 1 milliGRAM(s) IntraMuscular once PRN Glucose LESS THAN 70 milligrams/deciliter         Vitals log        ICU Vital Signs Last 24 Hrs  T(C): 36.2 (07 Apr 2019 05:17), Max: 36.7 (06 Apr 2019 20:13)  T(F): 97.2 (07 Apr 2019 05:17), Max: 98 (06 Apr 2019 20:13)  HR: 80 (07 Apr 2019 06:06) (80 - 107)  BP: 124/66 (07 Apr 2019 05:17) (110/71 - 136/66)  BP(mean): --  ABP: --  ABP(mean): --  RR: 19 (07 Apr 2019 05:17) (17 - 19)  SpO2: 96% (07 Apr 2019 06:06) (91% - 98%)           Input and Output:  I&O's Detail    06 Apr 2019 07:01  -  07 Apr 2019 07:00  --------------------------------------------------------  IN:  Total IN: 0 mL    OUT:    Voided: 1750 mL  Total OUT: 1750 mL    Total NET: -1750 mL          Lab Data                        12.0   11.51 )-----------( 263      ( 06 Apr 2019 09:44 )             39.2     04-06    137  |  94<L>  |  20  ----------------------------<  288<H>  4.8   |  37<H>  |  1.30    Ca    9.2      06 Apr 2019 09:44      ABG - ( 06 Apr 2019 11:02 )  pH, Arterial: 7.36  pH, Blood: x     /  pCO2: 61    /  pO2: 85    / HCO3: 31    / Base Excess: 8.4   /  SaO2: 96                      Review of Systems	      Objective     Physical Examination    heart s1s2  lung dec BS      Pertinent Lab findings & Imaging      Eliecer:  NO   Adequate UO     I&O's Detail    06 Apr 2019 07:01  -  07 Apr 2019 07:00  --------------------------------------------------------  IN:  Total IN: 0 mL    OUT:    Voided: 1750 mL  Total OUT: 1750 mL    Total NET: -1750 mL               Discussed with:     Cultures:	        Radiology

## 2019-04-07 NOTE — DISCHARGE NOTE PROVIDER - PROVIDER TOKENS
Left message for patient to give office a call back to schedule an appointment and a nurse visit for BP check   PROVIDER:[TOKEN:[[id number]]]

## 2019-04-07 NOTE — DISCHARGE NOTE PROVIDER - CARE PROVIDER_API CALL
Claudio Calabrese)  Cardiovascular Disease; Internal Medicine  [address] [facility] [facility] [address]  [place] [address]  Phone: [phone]  Fax: [phone]  Follow Up Time:

## 2019-04-07 NOTE — PROGRESS NOTE ADULT - PROBLEM SELECTOR PLAN 1
copd - cad - resp distress  on BIPAP  discussed goals of care with wife / patient  pt is on BIPAP this am -   pt is full code  supportive medical regimen in effect - ICU eval noted  on nebs, steroids, abx, and lasix  multiple comorbidities  oral and skin care  pt has hx of multiple admissions - multiple comorbidities  prognosis guarded
multiple comorbidities  pt is full code  on NIPPV - copd - resp distress  I and O  supportive medical regimen   discussed with wife   pulm and medicine notes follow up reviewed  prognosis remains guarded  multiple readmissions  will follow
multiple comorbidities  pt is full code  on NIPPV - copd - resp distress  I and O  supportive medical regimen   discussed with wife   pulm and medicine notes follow up reviewed  prognosis remains guarded  multiple readmissions  will follow.
- with acute  hypercapnic respiratory failure, respiratory acidosis  - continue BIPAP at 12/5/50 percent  - repeat ABG at 9 pm  - continue duonebs  - follow up blood cultures  - follow up legionella antigen  - pulm, Dr. Combs consulted  - ICU consult  - continue symbicort  - continue solumedrol 40 mg IV Q8  - Will start patient on Levaquin   - Flu A/B, RSV negative, will order complete RVP pane;
- with acute hypercapnic respiratory failure, respiratory acidosis  - continue BIPAP qHS  - continue duonebs  - follow up blood cultures  - follow up legionella antigen  - pulm, Dr. Combs consulted  - continue symbicort  - taper Solumedrol to 40 mg q12h.  - Continue Levaquin   - Flu A/B, RSV negative

## 2019-04-07 NOTE — DISCHARGE NOTE PROVIDER - HOSPITAL COURSE
79 yo male with PMHx of HTN, DM2 (on home Metformin and glipizide), COPD (2L home/ BIPAP at night), CAD with 3v CABG 2004, HLD, hx hypercapnic resp failure with recent intubation c/b cardiac arrest (6/2018) being admitted for hypercapnic respiratory failure 2/2 acute COPD exacerbation.    He was seen by pulm service and treated with IV steroids and levaquin.    -EKG showed SVT rather than Afib, patient is s/p cardizem drip, with rate 80s to 100s and cardio cleared ofr dc also. echo from last month shows EF of 55 percent with grade 1 diastolic dysfunction and he was diurese don this admission

## 2019-04-07 NOTE — DISCHARGE NOTE NURSING/CASE MANAGEMENT/SOCIAL WORK - NSDCDPATPORTLINK_GEN_ALL_CORE
You can access the [facility] Patient Portal, offered by [facility], by registering with the following website: [email or web address]

## 2019-04-07 NOTE — DISCHARGE NOTE PROVIDER - NSFOLLOWUPCLINICS_GEN_ALL_ED_FT
A Family Medicine Doctor  Family Medicine  .  [place]   Phone:   Fax:   Follow Up Time:     A Pulmonologist  Pulmonary Medicine  .  [place]   Phone:   Fax:   Follow Up Time:

## 2019-04-07 NOTE — PROGRESS NOTE ADULT - PROBLEM SELECTOR PROBLEM 1
Acute hypercapnic respiratory failure
COPD exacerbation
COPD exacerbation

## 2019-04-07 NOTE — PROGRESS NOTE ADULT - SUBJECTIVE AND OBJECTIVE BOX
Patient was examined Chart reviewed Detailed note will be inserted below after going over latest data Meanwhile please refer to my previous notes for Pulmonary/Critical Care assessment recommendations Patient was examined Chart reviewed Detailed note will be inserted below after going over latest data Meanwhile please refer to my previous notes for Pulmonary/Critical Care assessment recommendations     COMMODORE TURNER [facility]    ALLERGY [place]  CONTACT Sp Amira C    Initial evaluation/Pulmonary Critical Care consultation requested on 4/4/2019   by Dr Dawson    from Dr Dejesus   Patient examined chart reviewed    HOSPITAL ADMISSION  4/4/2019 [facility] DR MAYNOR THOMPSON  PATIENT CAME  FROM (if information available)        TYPE OF VISIT       Subsequent Pulmonary followup       REASON FOR VISIT  Please see problem list                 VITALS/LABS PATIENT COMMODORE TURNER 4/4/2019 ADMISSION [facility] DR MAYNOR THOMPSON        4/7/2019 afeb 93 114/64 16 94%   4/7/2019 W 9.9 Hb 12.4 Plt 252 Na 139 K 4.2 CO2 38 Cr 1.1     REVIEW OF SYMPTOMS    Able to give ROS  Yes     RELIABLE No   CONSTITUTIONAL Weakness Yes  Chills No Vision changes No  ENDOCRINE No unexplained hair loss No heat or cold intolerance    ALLERGY No hives  Sore throat No   RESP Coughing blood no  Shortness of breath YES   NEURO No Headache  Confusion Pain neck No   CARDIAC No Chest pain No Palpitations   GI No Pain abdomen NO   Vomiting NO     PHYSICAL EXAM    HEENT Unremarkable PERRLA atraumatic   RESP Fair air entry EXP prolonged    Harsh breath sound Resp distres mild   CARDIAC S1 S2 No S3     NO JVD    ABDOMEN SOFT BS PRESENT NOT DISTENDED No hepatosplenomegaly PEDAL EDEMA present No calf tenderness  NO rash   GENERAL Not TOXIC looking

## 2019-04-07 NOTE — PROGRESS NOTE ADULT - ASSESSMENT
77 yo male with PMHx of HTN, DM2 (on home Metformin and glipizide), COPD (2L home/ BIPAP at night), CAD with 3v CABG 2004, HLD, hx hypercapnic resp failure with recent intubation c/b with cardiac arrest (6/2018) being admitted for hypercapnic respiratory failure 2/2 acute COPD exacerbation.
79 yo male with PMHx of HTN, DM2 (on home Metformin and glipizide), COPD (2L home/ BIPAP at night), CAD with 3v CABG 2004, HLD, hx hypercapnic resp failure with recent intubation c/b cardiac arrest (6/2018) being admitted for hypercapnic respiratory failure 2/2 acute COPD exacerbation.
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
GLOBAL ISSUE/BEST PRACTICE:      PROBLEM: HOB elevation:   y            PROBLEM: Stress ulcer proph:    na                      PROBLEM: VTE prophylaxis:      Lvnx 40 94/4)   PROBLEM: Glycemic control:    iss (4/4)   PROBLEM: Nutrition:    npo (4/4) ch DASH (4/5)   PROBLEM: Advanced directive: na     PROBLEM: Allergies:  na    MEDICATIONS  PATIENT COMMODORE YUE 4/4/2019 ADMISSION [facility] DR MAYNOR THOMPSON            CAD  ASA 81 (4/4)   Plavix 75 (4/4)   Metoprolol 12.5x2 (4/4)     DVT P   Lvnx 40 94/4)     ANTIBIOTICS   Levaquin 750 (4/4)     DM  iss (4/4)     COPD   Duoneb.4 (4/4)   Symbicort 160 (4/4)   Solumed 40.3 (4/4)     CHF DIAST  Lasix 40 (4/4)   valsartan 80 (4/4)     BPH   Tamsulosin .4 (4/4)     BRIEF PATIENT  DESCRIPTION  [facility] DR MAYNOR THOMPSON       78 m with HO advanced COPD recurrent hospital admissions GOLD stage D admitted 3/11/2019 with COPD ex ac on chr combined resp failure   PMH HTN, DM2 (on home Metformin and glipizide), COPD (2L home/ BIPAP at night), CAD with 3v CABG 2004, HLD, 10/26/2018 ECHO ef 55% hx hypercapnic resp failure with recent intubation c/b with cardiac arrest (12/2018) admitted [facility] 4/4/2019 for hypercapnic respiratory failure 2/2 acute COPD exacerbation.    4/5/2019 Procalcitonin was n   4/5/2019 CXR showed no infiltrates     ASSESSMENT RECOMMENDATIONS PATIENT COMMODORE YUE 4/4/2019 ADMISSION [facility] DR MAYNOR THOMPSON                   COMBINED HYPERCAPNIC HYPOXIC RESP FAILURE   ABG improved      RESP TRACT INFECTION  Agree with bs abio However with low procal bact infection is unlikely     COPD   Cont bs steroids Increased symbicort to 160 (4/4)   4/6/2019 Improving DC in 24-48 h ontapering pred 30 taoer over 6d     RULE OUT MI   MI unlikely check serial enz     DIASTOLIC CHF  On lasix and arb Monitor volume statu    RO DVT  4/5/2019 V duplex ordered    TIME SPENT Over 25 minutes aggregate care time spent on encounter; activities included   direct patient care, counseling and/or coordinating care reviewing notes, lab data/ imaging , discussion with multidisciplinary team/ patient  /family. Risks, benefits, alternatives  discussed in detail.
GLOBAL ISSUE/BEST PRACTICE:      PROBLEM: HOB elevation:   y            PROBLEM: Stress ulcer proph:    na                      PROBLEM: VTE prophylaxis:      Lvnx 40 94/4)   PROBLEM: Glycemic control:    iss (4/4)   PROBLEM: Nutrition:    npo (4/4) ch DASH (4/5)   PROBLEM: Advanced directive: na     PROBLEM: Allergies:  na    MEDICATIONS  PATIENT COMMODORE YUE 4/4/2019 ADMISSION [facility] DR MAYNOR THOMPSON            CAD  ASA 81 (4/4)   Plavix 75 (4/4)   Metoprolol 12.5x2 (4/4)     DVT P   Lvnx 40 94/4)     ANTIBIOTICS   Levaquin 750 (4/4)     DM  iss (4/4)     COPD   Duoneb.4 (4/4)   Symbicort 160 (4/4)   Solumed 40.3 (4/4)     CHF DIAST  Lasix 40 (4/4)   valsartan 80 (4/4)     BPH   Tamsulosin .4 (4/4)     BRIEF PATIENT  DESCRIPTION  [facility] DR MAYNOR THOMPSON       78 m with HO advanced COPD recurrent hospital admissions GOLD stage D admitted 3/11/2019 with COPD ex ac on chr combined resp failure   PMH HTN, DM2 (on home Metformin and glipizide), COPD (2L home/ BIPAP at night), CAD with 3v CABG 2004, HLD, 10/26/2018 ECHO ef 55% hx hypercapnic resp failure with recent intubation c/b with cardiac arrest (12/2018) admitted [facility] 4/4/2019 for hypercapnic respiratory failure 2/2 acute COPD exacerbation.    4/5/2019 Procalcitonin was n   4/5/2019 CXR showed no infiltrates       PROBLEM  LIST PATIENT COMMODORE YUE 4/4/2019 ADMISSION [facility] DR MAYNOR THOMPSON        COMBINED HYPERCAPNIC HYPOXIC RESP FAILURE   4/4/2019 4p bpap 10/5/.5 728/76/216     RESP TRACT INFECTION  4/4/2019 w 7  4/4/2019 Pc n   4/4/2019 fluab n rsv n   4/4/2019 CXR no focal consolidations     COPD     RULE OUT MI   4/4/2019 Tr 1 n     DIASTOLIC CHF  3/12/2019 echo ef 55% dd1     ASSESSMENT RECOMMENDATIONS [facility]2019 ADMISSION [facility] DR MAYNOR THOMPSON                   COMBINED HYPERCAPNIC HYPOXIC RESP FAILURE   4/4/2019  8:46 PM REPEAT abg stat ordered to make sure he does not need to be placed on mech vent invasive type     RESP TRACT INFECTION  Agree with bs abio However with low procal bact infection is unlikely     COPD   Cont bs steroids Increased symbicort to 160 (4/4)     RULE OUT MI   MI unlikely check serial enz     DIASTOLIC CHF  On lasix and arb Monitor volume statu    RO DVT  4/5/2019 V duplex ordered    TIME SPENT Over 25 minutes aggregate care time spent on encounter; activities included   direct patient care, counseling and/or coordinating care reviewing notes, lab data/ imaging , discussion with multidisciplinary team/ patient  /family. Risks, benefits, alternatives  discussed in detail.
GLOBAL ISSUE/BEST PRACTICE:      PROBLEM: HOB elevation:   y            PROBLEM: Stress ulcer proph:    na                      PROBLEM: VTE prophylaxis:      Lvnx 40 94/4)   PROBLEM: Glycemic control:    iss (4/4)   PROBLEM: Nutrition:    npo (4/4) ch DASH (4/5)   PROBLEM: Advanced directive: na     PROBLEM: Allergies:  na    MEDICATIONS  PATIENT COMMODORE YUE 4/4/2019 ADMISS[address] [facility] DR MAYNOR THOMPSON            CAD  ASA 81 (4/4)   Plavix 75 (4/4)   Metoprolol 12.5x2 (4/4)     DVT P   Lvnx 40 94/4)     ANTIBIOTICS   Levaquin 750 (4/4)     DM  iss (4/4)     COPD   Duoneb.4 (4/4)   Symbicort 160 (4/4)   Solumed 40.3 (4/4)     CHF DIAST  Lasix 40 (4/4)   valsartan 80 (4/4)     BPH   Tamsulosin .4 (4/4)     BRIEF PATIENT  DESCRIPTION  [facility] DR MAYNOR THOMPSON       78 m with HO advanced COPD recurrent hospital admissions GOLD stage D admitted 3/11/2019 with COPD ex ac on chr combined resp failure   PMH HTN, DM2 (on home Metformin and glipizide), COPD (2L home/ BIPAP at night), CAD with 3v CABG 2004, HLD, 10/26/2018 ECHO ef 55% hx hypercapnic resp failure with recent intubation c/b with cardiac arrest (12/2018) admitted [facility] 4/4/2019 for hypercapnic respiratory failure 2/2 acute COPD exacerbation.    4/5/2019 Procalcitonin was n   4/5/2019 CXR showed no infiltrates       PROBLEM  LIST PATIENT COMMOD[place] YUE 4/4/2019 ADMISSION [facility] DR MAYNOR THOMPSON        COMBINED HYPERCAPNIC HYPOXIC RESP FAILURE   4/4/2019 4p bpap 10/5/.5 728/76/216     RESP TRACT INFECTION  4/4/2019 w 7  4/4/2019 Pc n   4/4/2019 fluab n rsv n   4/4/2019 CXR no focal consolidations     COPD     RULE OUT MI   4/4/2019 Tr 1 n     DIASTOLIC CHF  3/12/2019 echo ef 55% dd1     ASSESSMENT RECOMMENDATIONS PAT[facility] 4/4/[facility] ADMISSION [facility] DR MAYNOR THOMPSON                   COMBINED HYPERCAPNIC HYPOXIC RESP FAILURE   ABG improved      RESP TRACT INFECTION  Agree with bs abio However with low procal bact infection is unlikely     COPD   Cont bs steroids Increased symbicort to 160 (4/4)   4/6/2019 Improving DC in 24-48 h ontapering pred 30 taoer over 6d     RULE OUT MI   MI unlikely check serial enz     DIASTOLIC CHF  On lasix and arb Monitor volume statu    RO DVT  4/5/2019 V duplex n    TIME SPENT Over 25 minutes aggregate care time spent on encounter; activities included   direct patient care, counseling and/or coordinating care reviewing notes, lab data/ imaging , discussion with multidisciplinary team/ patient  /family. Risks, benefits, alternatives  discussed in detail.

## 2019-04-07 NOTE — DISCHARGE NOTE PROVIDER - NSDCCPCAREPLAN_GEN_ALL_CORE_FT
PRINCIPAL DISCHARGE DIAGNOSIS  Diagnosis: COPD exacerbation  Assessment and Plan of Treatment:       SECONDARY DISCHARGE DIAGNOSES  Diagnosis: Atrial fibrillation with rapid ventricular response  Assessment and Plan of Treatment:

## 2019-04-07 NOTE — PROGRESS NOTE ADULT - SUBJECTIVE AND OBJECTIVE BOX
Patient is a 78y Male with a known history of :  SVT (supraventricular tachycardia) (I47.1)  Acute hypercapnic respiratory failure (J96.02)  Goals of care, counseling/discussion (Z71.89)  Respiratory distress (R06.03)  BPH (benign prostatic hyperplasia) (N40.0)  Edema of lower extremity (R60.0)  Tachycardia (R00.0)  Need for prophylactic measure (Z29.9)  PVD (peripheral vascular disease) (I73.9)  Dyslipidemia (E78.5)  Diabetes Mellitus, Type II (E11.9)  CAD (Coronary Artery Disease) (I25.10)  Atrial fibrillation with rapid ventricular response (I48.91)  COPD exacerbation (J44.1)    HPI:  77 yo male with PMHx of HTN, DM2 (on home Metformin and glipizide), COPD (2L home/ BIPAP at night), CAD with 3v CABG 2004, HLD, hx hypercapnic resp failure with recent intubation c/b with cardiac arrest (6/2018), presenting to the ED for SOB and confusion starting today. Patient's wife states that this morning he had increased work of breathing. She took his vital signs. His BP, HR were fine but she noted his O2 saturation was in the high-80s-low 90s on NC. She observed that he was taking longer than usual to answer her questions and called 911. Patient denies headache, chest pain, abdominal pain, N/V/D, body aches. No sick contacts or recent travel. Has pitting edema in bilateral LE for several months.  Was admitted to [facility] [place] for acute on chronic hypercapnic respiratory failure 2/2 COPD exacerbation, treated with azithromycin and solumedrol.  Had a TTE, EF of 55 percent, grade 1 diastolic dysfunction.    In ED T 98  Afib  then came down to 107 after cardizem 10 mg IV  BP 91/43 then 128/76 RR 18 O2 sat 97 on RA  Labs significant for serum CO2 34, glucose 170  ABG pH 7.28 pCO2 76 pO2 216 HCO3 30 Base excess 8.1  CXR No focal consolidation or pleural effusion.  12 lead EKG shows   Received albuterol x1, duonebs x1, budesonide x1, cardizem 10 mg IV x1, Magnesium sulfate 2 g IV x1, solumedrol 125 mg IV x1 (04 Apr 2019 18:12)      REVIEW OF SYSTEMS:    CONSTITUTIONAL: No fever, weight loss, or fatigue  EYES: No eye pain, visual disturbances, or discharge  ENMT:  No difficulty hearing, tinnitus, vertigo; No sinus or throat pain  NECK: No pain or stiffness  BREASTS: No pain, masses, or nipple discharge  RESPIRATORY: No cough, wheezing, chills or hemoptysis; No shortness of breath  CARDIOVASCULAR: No chest pain, palpitations, dizziness, or leg swelling  GASTROINTESTINAL: No abdominal or epigastric pain. No nausea, vomiting, or hematemesis; No diarrhea or constipation. No melena or hematochezia.  GENITOURINARY: No dysuria, frequency, hematuria, or incontinence  NEUROLOGICAL: No headaches, memory loss, loss of strength, numbness, or tremors  SKIN: No itching, burning, rashes, or lesions   LYMPH NODES: No enlarged glands  ENDOCRINE: No heat or cold intolerance; No hair loss  MUSCULOSKELETAL: No joint pain or swelling; No muscle, back, or extremity pain  PSYCHIATRIC: No depression, anxiety, mood swings, or difficulty sleeping  HEME/LYMPH: No easy bruising, or bleeding gums  ALLERGY AND IMMUNOLOGIC: No hives or eczema    MEDICATIONS  (STANDING):  ALBUTerol/ipratropium for Nebulization 3 milliLiter(s) Nebulizer every 6 hours  aspirin enteric coated 81 milliGRAM(s) Oral daily  atorvastatin 40 milliGRAM(s) Oral at bedtime  buDESOnide 160 MICROgram(s)/formoterol 4.5 MICROgram(s) Inhaler 2 Puff(s) Inhalation two times a day  clopidogrel Tablet 75 milliGRAM(s) Oral daily  dextrose 5%. 1000 milliLiter(s) (50 mL/Hr) IV Continuous <Continuous>  dextrose 50% Injectable 12.5 Gram(s) IV Push once  dextrose 50% Injectable 25 Gram(s) IV Push once  dextrose 50% Injectable 25 Gram(s) IV Push once  enoxaparin Injectable 40 milliGRAM(s) SubCutaneous daily  insulin lispro (HumaLOG) corrective regimen sliding scale   SubCutaneous at bedtime  insulin lispro (HumaLOG) corrective regimen sliding scale   SubCutaneous three times a day before meals  levoFLOXacin  Tablet 750 milliGRAM(s) Oral every 24 hours  methylPREDNISolone sodium succinate Injectable 40 milliGRAM(s) IV Push every 12 hours  metoprolol tartrate 12.5 milliGRAM(s) Oral two times a day  multivitamin 1 Tablet(s) Oral daily  tamsulosin 0.4 milliGRAM(s) Oral at bedtime  valsartan 80 milliGRAM(s) Oral daily    MEDICATIONS  (PRN):  dextrose 40% Gel 15 Gram(s) Oral once PRN Blood Glucose LESS THAN 70 milliGRAM(s)/deciliter  glucagon  Injectable 1 milliGRAM(s) IntraMuscular once PRN Glucose LESS THAN 70 milligrams/deciliter      ALLERGIES: No Known Allergies      FAMILY HISTORY:  Family history of diabetes mellitus (Sibling)      Social history:  Alochol:   Smoking:   Drug Use:   Marital Status:     PHYSICAL EXAMINATION:  -----------------------------  T(C): 36.2 (04-07-19 @ 05:17), Max: 36.7 (04-06-19 @ 20:13)  HR: 80 (04-07-19 @ 06:06) (80 - 107)  BP: 124/66 (04-07-19 @ 05:17) (110/71 - 136/66)  RR: 19 (04-07-19 @ 05:17) (17 - 19)  SpO2: 96% (04-07-19 @ 06:06) (91% - 98%)  Wt(kg): --    04-06 @ 07:01  -  04-07 @ 07:00  --------------------------------------------------------  IN:  Total IN: 0 mL    OUT:    Voided: 1750 mL  Total OUT: 1750 mL    Total NET: -1750 mL            Constitutional: well developed, normal appearance, well groomed, well nourished, no deformities and no acute distress.   Eyes: the conjunctiva exhibited no abnormalities and the eyelids demonstrated no xanthelasmas.   HEENT: normal oral mucosa, no oral pallor and no oral cyanosis.   Neck: normal jugular venous A waves present, normal jugular venous V waves present and no jugular venous cummins A waves.   Pulmonary: no respiratory distress, normal respiratory rhythm and effort, no accessory muscle use and lungs were clear to auscultation bilaterally. Anteriorly   Cardiovascular: heart rate and rhythm were normal, normal S1 and S2 and no murmur, gallop, rub, heave or thrill are present.   Musculoskeletal: the gait could not be assessed.   Extremities: no clubbing of the fingernails, no localized cyanosis, no petechial hemorrhages and no ischemic changes.   Skin: normal skin color and pigmentation, no rash, no venous stasis, no skin lesions, no skin ulcer and no xanthoma was observed.   Psychiatric: oriented to person, place, and time, the affect was normal, the mood was normal and not feeling anxious.     LABS:   --------  04-06    137  |  94<L>  |  20  ----------------------------<  288<H>  4.8   |  37<H>  |  1.30    Ca    9.2      06 Apr 2019 09:44                           12.0   11.51 )-----------( 263      ( 06 Apr 2019 09:44 )             39.2                   Radiology:

## 2019-04-15 ENCOUNTER — APPOINTMENT (OUTPATIENT)
Dept: VASCULAR SURGERY | Facility: CLINIC | Age: 79
End: 2019-04-15

## 2019-04-25 ENCOUNTER — INPATIENT (INPATIENT)
Facility: HOSPITAL | Age: 79
LOS: 2 days | Discharge: HOME | DRG: 189 | End: 2019-04-28
Attending: STUDENT IN AN ORGANIZED HEALTH CARE EDUCATION/TRAINING PROGRAM | Admitting: STUDENT IN AN ORGANIZED HEALTH CARE EDUCATION/TRAINING PROGRAM
Payer: COMMERCIAL

## 2019-04-25 VITALS
WEIGHT: 199.96 LBS | HEART RATE: 118 BPM | SYSTOLIC BLOOD PRESSURE: 132 MMHG | TEMPERATURE: 98 F | RESPIRATION RATE: 17 BRPM | OXYGEN SATURATION: 100 % | HEIGHT: 71 IN | DIASTOLIC BLOOD PRESSURE: 73 MMHG

## 2019-04-25 DIAGNOSIS — I10 ESSENTIAL (PRIMARY) HYPERTENSION: ICD-10-CM

## 2019-04-25 DIAGNOSIS — R00.0 TACHYCARDIA, UNSPECIFIED: ICD-10-CM

## 2019-04-25 DIAGNOSIS — I73.9 PERIPHERAL VASCULAR DISEASE, UNSPECIFIED: ICD-10-CM

## 2019-04-25 DIAGNOSIS — J96.22 ACUTE AND CHRONIC RESPIRATORY FAILURE WITH HYPERCAPNIA: ICD-10-CM

## 2019-04-25 DIAGNOSIS — Z29.9 ENCOUNTER FOR PROPHYLACTIC MEASURES, UNSPECIFIED: ICD-10-CM

## 2019-04-25 DIAGNOSIS — I25.10 ATHEROSCLEROTIC HEART DISEASE OF NATIVE CORONARY ARTERY WITHOUT ANGINA PECTORIS: ICD-10-CM

## 2019-04-25 DIAGNOSIS — T14.8XXA OTHER INJURY OF UNSPECIFIED BODY REGION, INITIAL ENCOUNTER: Chronic | ICD-10-CM

## 2019-04-25 DIAGNOSIS — N40.0 BENIGN PROSTATIC HYPERPLASIA WITHOUT LOWER URINARY TRACT SYMPTOMS: ICD-10-CM

## 2019-04-25 DIAGNOSIS — E11.9 TYPE 2 DIABETES MELLITUS WITHOUT COMPLICATIONS: ICD-10-CM

## 2019-04-25 DIAGNOSIS — J44.9 CHRONIC OBSTRUCTIVE PULMONARY DISEASE, UNSPECIFIED: ICD-10-CM

## 2019-04-25 LAB
ALBUMIN SERPL ELPH-MCNC: 3.5 G/DL — SIGNIFICANT CHANGE UP (ref 3.3–5)
ALP SERPL-CCNC: 90 U/L — SIGNIFICANT CHANGE UP (ref 40–120)
ALT FLD-CCNC: 23 U/L — SIGNIFICANT CHANGE UP (ref 12–78)
ANION GAP SERPL CALC-SCNC: 3 MMOL/L — LOW (ref 5–17)
ANION GAP SERPL CALC-SCNC: 4 MMOL/L — LOW (ref 5–17)
APTT BLD: 36.3 SEC — SIGNIFICANT CHANGE UP (ref 27.5–36.3)
APTT BLD: 37.8 SEC — HIGH (ref 27.5–36.3)
AST SERPL-CCNC: 13 U/L — LOW (ref 15–37)
BASE EXCESS BLDA CALC-SCNC: 2.7 MMOL/L — HIGH (ref -2–2)
BASE EXCESS BLDA CALC-SCNC: 3.2 MMOL/L — HIGH (ref -2–2)
BASE EXCESS BLDA CALC-SCNC: 3.3 MMOL/L — HIGH (ref -2–2)
BASOPHILS # BLD AUTO: 0.01 K/UL — SIGNIFICANT CHANGE UP (ref 0–0.2)
BASOPHILS # BLD AUTO: 0.03 K/UL — SIGNIFICANT CHANGE UP (ref 0–0.2)
BASOPHILS NFR BLD AUTO: 0.1 % — SIGNIFICANT CHANGE UP (ref 0–2)
BASOPHILS NFR BLD AUTO: 0.3 % — SIGNIFICANT CHANGE UP (ref 0–2)
BILIRUB SERPL-MCNC: 0.4 MG/DL — SIGNIFICANT CHANGE UP (ref 0.2–1.2)
BLOOD GAS COMMENTS ARTERIAL: SIGNIFICANT CHANGE UP
BUN SERPL-MCNC: 12 MG/DL — SIGNIFICANT CHANGE UP (ref 7–23)
BUN SERPL-MCNC: 13 MG/DL — SIGNIFICANT CHANGE UP (ref 7–23)
CALCIUM SERPL-MCNC: 9 MG/DL — SIGNIFICANT CHANGE UP (ref 8.5–10.1)
CALCIUM SERPL-MCNC: 9.5 MG/DL — SIGNIFICANT CHANGE UP (ref 8.5–10.1)
CHLORIDE SERPL-SCNC: 105 MMOL/L — SIGNIFICANT CHANGE UP (ref 96–108)
CHLORIDE SERPL-SCNC: 106 MMOL/L — SIGNIFICANT CHANGE UP (ref 96–108)
CK MB BLD-MCNC: 4.2 % — HIGH (ref 0–3.5)
CK MB CFR SERPL CALC: 2.7 NG/ML — SIGNIFICANT CHANGE UP (ref 0–3.6)
CK MB CFR SERPL CALC: 2.9 NG/ML — SIGNIFICANT CHANGE UP (ref 0–3.6)
CK SERPL-CCNC: 69 U/L — SIGNIFICANT CHANGE UP (ref 26–308)
CO2 SERPL-SCNC: 31 MMOL/L — SIGNIFICANT CHANGE UP (ref 22–31)
CO2 SERPL-SCNC: 32 MMOL/L — HIGH (ref 22–31)
CREAT SERPL-MCNC: 1.1 MG/DL — SIGNIFICANT CHANGE UP (ref 0.5–1.3)
CREAT SERPL-MCNC: 1.1 MG/DL — SIGNIFICANT CHANGE UP (ref 0.5–1.3)
EOSINOPHIL # BLD AUTO: 0.02 K/UL — SIGNIFICANT CHANGE UP (ref 0–0.5)
EOSINOPHIL # BLD AUTO: 0.64 K/UL — HIGH (ref 0–0.5)
EOSINOPHIL NFR BLD AUTO: 0.2 % — SIGNIFICANT CHANGE UP (ref 0–6)
EOSINOPHIL NFR BLD AUTO: 6.4 % — HIGH (ref 0–6)
FLU A RESULT: SIGNIFICANT CHANGE UP
FLU A RESULT: SIGNIFICANT CHANGE UP
FLUAV AG NPH QL: SIGNIFICANT CHANGE UP
FLUBV AG NPH QL: SIGNIFICANT CHANGE UP
GLUCOSE SERPL-MCNC: 195 MG/DL — HIGH (ref 70–99)
GLUCOSE SERPL-MCNC: 236 MG/DL — HIGH (ref 70–99)
HCO3 BLDA-SCNC: 25 MMOL/L — SIGNIFICANT CHANGE UP (ref 23–27)
HCO3 BLDA-SCNC: 25 MMOL/L — SIGNIFICANT CHANGE UP (ref 23–27)
HCO3 BLDA-SCNC: 26 MMOL/L — SIGNIFICANT CHANGE UP (ref 23–27)
HCT VFR BLD CALC: 39.3 % — SIGNIFICANT CHANGE UP (ref 39–50)
HCT VFR BLD CALC: 42.5 % — SIGNIFICANT CHANGE UP (ref 39–50)
HGB BLD-MCNC: 12.1 G/DL — LOW (ref 13–17)
HGB BLD-MCNC: 12.9 G/DL — LOW (ref 13–17)
HOROWITZ INDEX BLDA+IHG-RTO: 30 — SIGNIFICANT CHANGE UP
IMM GRANULOCYTES NFR BLD AUTO: 0.5 % — SIGNIFICANT CHANGE UP (ref 0–1.5)
IMM GRANULOCYTES NFR BLD AUTO: 0.7 % — SIGNIFICANT CHANGE UP (ref 0–1.5)
INR BLD: 0.96 RATIO — SIGNIFICANT CHANGE UP (ref 0.88–1.16)
INR BLD: 1 RATIO — SIGNIFICANT CHANGE UP (ref 0.88–1.16)
LACTATE SERPL-SCNC: 0.6 MMOL/L — LOW (ref 0.7–2)
LYMPHOCYTES # BLD AUTO: 0.29 K/UL — LOW (ref 1–3.3)
LYMPHOCYTES # BLD AUTO: 1.39 K/UL — SIGNIFICANT CHANGE UP (ref 1–3.3)
LYMPHOCYTES # BLD AUTO: 13.9 % — SIGNIFICANT CHANGE UP (ref 13–44)
LYMPHOCYTES # BLD AUTO: 3.1 % — LOW (ref 13–44)
MAGNESIUM SERPL-MCNC: 2 MG/DL — SIGNIFICANT CHANGE UP (ref 1.6–2.6)
MCHC RBC-ENTMCNC: 28.2 PG — SIGNIFICANT CHANGE UP (ref 27–34)
MCHC RBC-ENTMCNC: 28.2 PG — SIGNIFICANT CHANGE UP (ref 27–34)
MCHC RBC-ENTMCNC: 30.4 GM/DL — LOW (ref 32–36)
MCHC RBC-ENTMCNC: 30.8 GM/DL — LOW (ref 32–36)
MCV RBC AUTO: 91.6 FL — SIGNIFICANT CHANGE UP (ref 80–100)
MCV RBC AUTO: 92.8 FL — SIGNIFICANT CHANGE UP (ref 80–100)
MONOCYTES # BLD AUTO: 0.08 K/UL — SIGNIFICANT CHANGE UP (ref 0–0.9)
MONOCYTES # BLD AUTO: 0.67 K/UL — SIGNIFICANT CHANGE UP (ref 0–0.9)
MONOCYTES NFR BLD AUTO: 0.8 % — LOW (ref 2–14)
MONOCYTES NFR BLD AUTO: 6.7 % — SIGNIFICANT CHANGE UP (ref 2–14)
NEUTROPHILS # BLD AUTO: 7.2 K/UL — SIGNIFICANT CHANGE UP (ref 1.8–7.4)
NEUTROPHILS # BLD AUTO: 8.97 K/UL — HIGH (ref 1.8–7.4)
NEUTROPHILS NFR BLD AUTO: 72 % — SIGNIFICANT CHANGE UP (ref 43–77)
NEUTROPHILS NFR BLD AUTO: 95.3 % — HIGH (ref 43–77)
NRBC # BLD: 0 /100 WBCS — SIGNIFICANT CHANGE UP (ref 0–0)
NRBC # BLD: 0 /100 WBCS — SIGNIFICANT CHANGE UP (ref 0–0)
NT-PROBNP SERPL-SCNC: 102 PG/ML — SIGNIFICANT CHANGE UP (ref 0–450)
PCO2 BLDA: 59 MMHG — HIGH (ref 32–46)
PCO2 BLDA: 70 MMHG — CRITICAL HIGH (ref 32–46)
PCO2 BLDA: 74 MMHG — CRITICAL HIGH (ref 32–46)
PH BLDA: 7.23 — LOW (ref 7.35–7.45)
PH BLDA: 7.24 — LOW (ref 7.35–7.45)
PH BLDA: 7.31 — LOW (ref 7.35–7.45)
PHOSPHATE SERPL-MCNC: 3 MG/DL — SIGNIFICANT CHANGE UP (ref 2.5–4.5)
PLATELET # BLD AUTO: 264 K/UL — SIGNIFICANT CHANGE UP (ref 150–400)
PLATELET # BLD AUTO: 284 K/UL — SIGNIFICANT CHANGE UP (ref 150–400)
PO2 BLDA: 76 MMHG — SIGNIFICANT CHANGE UP (ref 74–108)
PO2 BLDA: 87 MMHG — SIGNIFICANT CHANGE UP (ref 74–108)
PO2 BLDA: 95 MMHG — SIGNIFICANT CHANGE UP (ref 74–108)
POTASSIUM SERPL-MCNC: 4.6 MMOL/L — SIGNIFICANT CHANGE UP (ref 3.5–5.3)
POTASSIUM SERPL-MCNC: 4.9 MMOL/L — SIGNIFICANT CHANGE UP (ref 3.5–5.3)
POTASSIUM SERPL-SCNC: 4.6 MMOL/L — SIGNIFICANT CHANGE UP (ref 3.5–5.3)
POTASSIUM SERPL-SCNC: 4.9 MMOL/L — SIGNIFICANT CHANGE UP (ref 3.5–5.3)
PROCALCITONIN SERPL-MCNC: <0.05 NG/ML — SIGNIFICANT CHANGE UP (ref 0–0.04)
PROT SERPL-MCNC: 6.6 G/DL — SIGNIFICANT CHANGE UP (ref 6–8.3)
PROTHROM AB SERPL-ACNC: 10.8 SEC — SIGNIFICANT CHANGE UP (ref 10–12.9)
PROTHROM AB SERPL-ACNC: 11.3 SEC — SIGNIFICANT CHANGE UP (ref 10–12.9)
RBC # BLD: 4.29 M/UL — SIGNIFICANT CHANGE UP (ref 4.2–5.8)
RBC # BLD: 4.58 M/UL — SIGNIFICANT CHANGE UP (ref 4.2–5.8)
RBC # FLD: 12.9 % — SIGNIFICANT CHANGE UP (ref 10.3–14.5)
RBC # FLD: 13.2 % — SIGNIFICANT CHANGE UP (ref 10.3–14.5)
RSV RESULT: SIGNIFICANT CHANGE UP
RSV RNA RESP QL NAA+PROBE: SIGNIFICANT CHANGE UP
SAO2 % BLDA: 92 % — SIGNIFICANT CHANGE UP (ref 92–96)
SAO2 % BLDA: 95 % — SIGNIFICANT CHANGE UP (ref 92–96)
SAO2 % BLDA: 97 % — HIGH (ref 92–96)
SODIUM SERPL-SCNC: 140 MMOL/L — SIGNIFICANT CHANGE UP (ref 135–145)
SODIUM SERPL-SCNC: 141 MMOL/L — SIGNIFICANT CHANGE UP (ref 135–145)
TROPONIN I SERPL-MCNC: <.015 NG/ML — SIGNIFICANT CHANGE UP (ref 0.01–0.04)
TROPONIN I SERPL-MCNC: <.015 NG/ML — SIGNIFICANT CHANGE UP (ref 0.01–0.04)
WBC # BLD: 10 K/UL — SIGNIFICANT CHANGE UP (ref 3.8–10.5)
WBC # BLD: 9.42 K/UL — SIGNIFICANT CHANGE UP (ref 3.8–10.5)
WBC # FLD AUTO: 10 K/UL — SIGNIFICANT CHANGE UP (ref 3.8–10.5)
WBC # FLD AUTO: 9.42 K/UL — SIGNIFICANT CHANGE UP (ref 3.8–10.5)

## 2019-04-25 PROCEDURE — 99223 1ST HOSP IP/OBS HIGH 75: CPT | Mod: GC,AI

## 2019-04-25 PROCEDURE — 99291 CRITICAL CARE FIRST HOUR: CPT

## 2019-04-25 PROCEDURE — 71045 X-RAY EXAM CHEST 1 VIEW: CPT | Mod: 26

## 2019-04-25 PROCEDURE — 99283 EMERGENCY DEPT VISIT LOW MDM: CPT

## 2019-04-25 RX ORDER — IPRATROPIUM/ALBUTEROL SULFATE 18-103MCG
3 AEROSOL WITH ADAPTER (GRAM) INHALATION ONCE
Qty: 0 | Refills: 0 | Status: COMPLETED | OUTPATIENT
Start: 2019-04-25 | End: 2019-04-25

## 2019-04-25 RX ORDER — ATORVASTATIN CALCIUM 80 MG/1
40 TABLET, FILM COATED ORAL AT BEDTIME
Qty: 0 | Refills: 0 | Status: DISCONTINUED | OUTPATIENT
Start: 2019-04-25 | End: 2019-04-28

## 2019-04-25 RX ORDER — CLOPIDOGREL BISULFATE 75 MG/1
75 TABLET, FILM COATED ORAL DAILY
Qty: 0 | Refills: 0 | Status: DISCONTINUED | OUTPATIENT
Start: 2019-04-25 | End: 2019-04-28

## 2019-04-25 RX ORDER — IPRATROPIUM/ALBUTEROL SULFATE 18-103MCG
3 AEROSOL WITH ADAPTER (GRAM) INHALATION EVERY 6 HOURS
Qty: 0 | Refills: 0 | Status: DISCONTINUED | OUTPATIENT
Start: 2019-04-25 | End: 2019-04-28

## 2019-04-25 RX ORDER — AZITHROMYCIN 500 MG/1
TABLET, FILM COATED ORAL
Qty: 0 | Refills: 0 | Status: DISCONTINUED | OUTPATIENT
Start: 2019-04-25 | End: 2019-04-26

## 2019-04-25 RX ORDER — ALBUTEROL 90 UG/1
2.5 AEROSOL, METERED ORAL ONCE
Qty: 0 | Refills: 0 | Status: COMPLETED | OUTPATIENT
Start: 2019-04-25 | End: 2019-04-25

## 2019-04-25 RX ORDER — METOPROLOL TARTRATE 50 MG
12.5 TABLET ORAL
Qty: 0 | Refills: 0 | Status: DISCONTINUED | OUTPATIENT
Start: 2019-04-25 | End: 2019-04-28

## 2019-04-25 RX ORDER — AZITHROMYCIN 500 MG/1
500 TABLET, FILM COATED ORAL EVERY 24 HOURS
Qty: 0 | Refills: 0 | Status: DISCONTINUED | OUTPATIENT
Start: 2019-04-26 | End: 2019-04-26

## 2019-04-25 RX ORDER — BUDESONIDE AND FORMOTEROL FUMARATE DIHYDRATE 160; 4.5 UG/1; UG/1
2 AEROSOL RESPIRATORY (INHALATION)
Qty: 0 | Refills: 0 | Status: DISCONTINUED | OUTPATIENT
Start: 2019-04-25 | End: 2019-04-28

## 2019-04-25 RX ORDER — MAGNESIUM SULFATE 500 MG/ML
1 VIAL (ML) INJECTION ONCE
Qty: 0 | Refills: 0 | Status: COMPLETED | OUTPATIENT
Start: 2019-04-25 | End: 2019-04-25

## 2019-04-25 RX ORDER — TAMSULOSIN HYDROCHLORIDE 0.4 MG/1
0.4 CAPSULE ORAL AT BEDTIME
Qty: 0 | Refills: 0 | Status: DISCONTINUED | OUTPATIENT
Start: 2019-04-25 | End: 2019-04-28

## 2019-04-25 RX ORDER — HEPARIN SODIUM 5000 [USP'U]/ML
5000 INJECTION INTRAVENOUS; SUBCUTANEOUS EVERY 8 HOURS
Qty: 0 | Refills: 0 | Status: DISCONTINUED | OUTPATIENT
Start: 2019-04-25 | End: 2019-04-26

## 2019-04-25 RX ORDER — VALSARTAN 80 MG/1
80 TABLET ORAL DAILY
Qty: 0 | Refills: 0 | Status: DISCONTINUED | OUTPATIENT
Start: 2019-04-25 | End: 2019-04-28

## 2019-04-25 RX ORDER — INSULIN LISPRO 100/ML
VIAL (ML) SUBCUTANEOUS EVERY 4 HOURS
Qty: 0 | Refills: 0 | Status: DISCONTINUED | OUTPATIENT
Start: 2019-04-25 | End: 2019-04-26

## 2019-04-25 RX ORDER — AZITHROMYCIN 500 MG/1
500 TABLET, FILM COATED ORAL ONCE
Qty: 0 | Refills: 0 | Status: COMPLETED | OUTPATIENT
Start: 2019-04-25 | End: 2019-04-25

## 2019-04-25 RX ORDER — ALBUTEROL 90 UG/1
2.5 AEROSOL, METERED ORAL
Qty: 0 | Refills: 0 | Status: DISCONTINUED | OUTPATIENT
Start: 2019-04-25 | End: 2019-04-28

## 2019-04-25 RX ORDER — IPRATROPIUM/ALBUTEROL SULFATE 18-103MCG
3 AEROSOL WITH ADAPTER (GRAM) INHALATION
Qty: 0 | Refills: 0 | Status: DISCONTINUED | OUTPATIENT
Start: 2019-04-25 | End: 2019-04-25

## 2019-04-25 RX ADMIN — Medication 3 MILLILITER(S): at 11:51

## 2019-04-25 RX ADMIN — Medication 60 MILLIGRAM(S): at 18:29

## 2019-04-25 RX ADMIN — HEPARIN SODIUM 5000 UNIT(S): 5000 INJECTION INTRAVENOUS; SUBCUTANEOUS at 21:54

## 2019-04-25 RX ADMIN — Medication 4: at 21:54

## 2019-04-25 RX ADMIN — Medication 3 MILLILITER(S): at 20:29

## 2019-04-25 RX ADMIN — ATORVASTATIN CALCIUM 40 MILLIGRAM(S): 80 TABLET, FILM COATED ORAL at 21:54

## 2019-04-25 RX ADMIN — AZITHROMYCIN 500 MILLIGRAM(S): 500 TABLET, FILM COATED ORAL at 14:35

## 2019-04-25 RX ADMIN — CLOPIDOGREL BISULFATE 75 MILLIGRAM(S): 75 TABLET, FILM COATED ORAL at 18:29

## 2019-04-25 RX ADMIN — TAMSULOSIN HYDROCHLORIDE 0.4 MILLIGRAM(S): 0.4 CAPSULE ORAL at 21:53

## 2019-04-25 RX ADMIN — Medication 3 MILLILITER(S): at 11:39

## 2019-04-25 RX ADMIN — Medication 3 MILLILITER(S): at 13:58

## 2019-04-25 RX ADMIN — Medication 4: at 18:31

## 2019-04-25 RX ADMIN — Medication 125 MILLIGRAM(S): at 11:38

## 2019-04-25 RX ADMIN — VALSARTAN 80 MILLIGRAM(S): 80 TABLET ORAL at 18:36

## 2019-04-25 RX ADMIN — Medication 12.5 MILLIGRAM(S): at 18:29

## 2019-04-25 RX ADMIN — Medication 100 GRAM(S): at 15:20

## 2019-04-25 RX ADMIN — AZITHROMYCIN 255 MILLIGRAM(S): 500 TABLET, FILM COATED ORAL at 13:35

## 2019-04-25 RX ADMIN — ALBUTEROL 2.5 MILLIGRAM(S): 90 AEROSOL, METERED ORAL at 15:36

## 2019-04-25 RX ADMIN — Medication 3 MILLILITER(S): at 12:00

## 2019-04-25 NOTE — H&P ADULT - PROBLEM SELECTOR PLAN 1
Hypercapnic Resp Failure   -Pt To Go to ICU, Plan per ICU Team at This Time. Will FU   -ICU Consulted, Recs Appreciated. c/w  ICU management.   -Duoneb q 6 hrs  -albuterol nebu q 1 hr prn wheezes and sob. Pt Currenlty w/ Wheezing and SOB  -C/w Solumedrol to 60 mg IV q 6 hr  -Pulm C/s, rec Check procal, and c/w Azithromycin, c/w BD Steroids.   - continue BIPAP Fi02 30, IPAP 16, CPAP 6  -FU Labs Per ICU Team   - follow up blood cultures/Urine Cultures   - follow up legionella antigen   - Flu A/B, RSV negative

## 2019-04-25 NOTE — ED PROVIDER NOTE - OBJECTIVE STATEMENT
79yo M h/o htn, dm ,cad cabg, PVD, copd O2 dependent p/w dyspanea cough x 2 days. denies f/c/n/v/d/cp.

## 2019-04-25 NOTE — H&P ADULT - PROBLEM SELECTOR PROBLEM 7
Hpi Title: Evaluation of a Skin Lesion How Severe Are Your Spot(S)?: mild Have Your Spot(S) Been Treated In The Past?: has not been treated Family Member: Brother Diabetes Mellitus, Type II

## 2019-04-25 NOTE — ED ADULT NURSE NOTE - OBJECTIVE STATEMENT
received pt in bed #5a BIBA from home Pt A&O c/o worsening SOB since yesterday Pt tachypneic, labored & wheezing O2 sat 97% on 2L NC. Cm placed w/  EKG done Dr Dillon @ bedside Pt placed on Bipap 10/6 30% resp tx's given per respiratory Will cont to monitor

## 2019-04-25 NOTE — H&P ADULT - ATTENDING COMMENTS
I personally conducted a physical examination of the patient. I personally gathered the patient's history. I edited the above listed findings which were prepared by the listed resident physician. I personally discussed the plan of care with the patient. The questions and concerns were addressed to the best of my ability. The patient is in agreement with the listed treatment plan.    79yo M w/ PMHx DM2 on po regimen, HTN, COPD on 2L O2 w/ BiPAP at night, h/o cardiac arrest 6/2018 admitted for acute hypoxic and hypercapnic respiratory failure  - COPD exacerbation - monitor in MICU for impending respiratory failure, IV steroids, IV Mg, bipap, further management per MICU, no indication for abx currently, pulm consult (Oleg/Lauryn)  - other comorbidities as above

## 2019-04-25 NOTE — CONSULT NOTE ADULT - ASSESSMENT
BRIEF PATIENT  DESCRIPTION  PATIENT COMMODALAN TURNER 4/25/2019 ADMISSION [facility] P HOSPITALIST        ALLERGY  shellfish                                                           CC      dusp                                                 HPI        78 m advanced COPD co sob cough x 2 d  At home is on Breo 100 incruse roflimulast                                                                                               PMH                                        PMH HTN, DM2 (on home Metformin and glipizide), COPD (2L home/ BIPAP at night), CAD with 3v CABG 2004, HLD, 10/26/2018 ECHO ef 55% hx hypercapnic resp failure with recent intubation c/b with cardiac arrest (12/2018)       Home meds                                   As above                                         ER vitals                                                afeb 118 130/70         ASSESSMENT RECOMMENDATIONS PATIENT  COMMODORE YUE 4/25/2019 ADMISSION [facility] P HOSPITALIST          COPD ex   BD steroids   AC COMBINED RESP FAILURE   4/25/2019 dw ic pa He will eval   4/25/2019 bpap settings increased 4/25/2019 12:47 PM dw rt   4/25/2019 Repeat abg at 2p   RESP TRACT INFECTION   4/25/2019 Check procalcito  4/25/2019 Azithro     TIME SPENT Over 55 minutes aggregate care time spent on encounter; activities included   direct patient care, counseling and/or coordinating care reviewing notes, lab data/ imaging , discussion with multidisciplinary team/ patient  /family. Risks, benefits, alternatives  discussed in detail

## 2019-04-25 NOTE — H&P ADULT - NSHPREVIEWOFSYSTEMS_GEN_ALL_CORE
CONSTITUTIONAL: No weakness, fevers or chills  EYES/ENT: No visual changes;  No vertigo or throat pain   NECK: No pain or stiffness  RESPIRATORY: +Cough non Productive, wheezing, hemoptysis; + shortness of breath  CARDIOVASCULAR: No chest pain or palpitations  GASTROINTESTINAL: No abdominal or epigastric pain. No nausea, vomiting, or hematemesis; No diarrhea or constipation. No melena or hematochezia.  GENITOURINARY: No dysuria, frequency or hematuria  NEUROLOGICAL: No numbness or weakness  SKIN: No itching, rashes

## 2019-04-25 NOTE — H&P ADULT - NSHPPHYSICALEXAM_GEN_ALL_CORE
General: No acute distress, on BIPAP  HEENT: Normocephallic Atraumatic, PERRLA, EOMI bl, moist mucous membranes   Neck: Supple, nontender, no mass  Neurology: AA&Ox3, no focal deficits, sensation intact  Respiratory: + wheeze, coarse breath sounds in bilateral bases, On BiPAP   CV: tachycardic regular rhythm, +S1/S2, no murmurs, rubs or gallops  Abdominal: Soft, Non-Tender, Non-Distended +Bowel Soundsx4  Extremities: + 2 pitting edema in bilateral   Musculoskeletal: , no joint erythema or warmth, no joint swelling   Skin: warm, dry, normal color, no rash or abnormal lesions Vital Signs Last 24 Hrs  T(C): 36.6 (25 Apr 2019 20:01), Max: 36.8 (25 Apr 2019 16:35)  T(F): 97.9 (25 Apr 2019 20:01), Max: 98.3 (25 Apr 2019 16:35)  HR: 63 (26 Apr 2019 00:00) (63 - 138)  BP: 116/66 (26 Apr 2019 00:00) (116/66 - 174/78)  BP(mean): 86 (26 Apr 2019 00:00) (81 - 112)  RR: 20 (26 Apr 2019 00:00) (15 - 35)  SpO2: 98% (26 Apr 2019 00:00) (96% - 100%)    General: No acute distress, on BIPAP  HEENT: Normocephallic Atraumatic, PERRLA, EOMI bl, moist mucous membranes   Neck: Supple, nontender, no mass  Neurology: AA&Ox3, no focal deficits, sensation intact  Respiratory: + wheeze, coarse breath sounds in bilateral bases, On BiPAP   CV: tachycardic regular rhythm, +S1/S2, no murmurs, rubs or gallops  Abdominal: Soft, Non-Tender, Non-Distended +Bowel Soundsx4  Extremities: + 2 pitting edema in bilateral   Musculoskeletal: , no joint erythema or warmth, no joint swelling   Skin: warm, dry, normal color, no rash or abnormal lesions

## 2019-04-25 NOTE — ED PROVIDER NOTE - CLINICAL SUMMARY MEDICAL DECISION MAKING FREE TEXT BOX
77yo M h/o htn, dm ,cad cabg, PVD, copd O2 dependent p/w dyspanea cough x 2 days. denies f/c/n/v/d/cp. 79yo M h/o htn, dm ,cad cabg, PVD, copd O2 dependent p/w dyspanea cough x 2 days. denies f/c/n/v/d/cp.   abg shows hypercapnia, placed on bipap w/ improvement, cxr labs ekg unremarkable, given nebs, steroids, icu consulted,   pt admitted for further eval and mgmt

## 2019-04-25 NOTE — ED PROVIDER NOTE - CARE PLAN
Principal Discharge DX:	Acute on chronic respiratory failure with hypercapnia  Secondary Diagnosis:	Chronic obstructive pulmonary disease, unspecified COPD type

## 2019-04-25 NOTE — ED ADULT NURSE NOTE - ED STAT RN HANDOFF DETAILS 2
attempted to give report to ICU stated the nurse is in a room w/ a pt & then they have afternoon brief

## 2019-04-25 NOTE — CONSULT NOTE ADULT - PROBLEM SELECTOR RECOMMENDATION 9
goals of care discussed with the wife  pt is full code  multiple comorbidities  resp distress - COPD / Heart Disease - NIPPV  on COPD regimen  monitor vs and HD and Sat  keep sat > 88 pct  assist with ADL  anxiolytics  will follow and monitor   prognosis guarded - multiple readmissions, multiple comorbidities, ICU management and supportive care

## 2019-04-25 NOTE — H&P ADULT - PROBLEM SELECTOR PLAN 9
DVT PPX per ICU Team at this time     OOB with assistance  Fall precautions  continue BIPAP, with NC at daytime  IMPROVE VTE Individual Risk Assessment          RISK                                                          Points  [  ] Previous VTE                                                3  [  ] Thrombophilia                                             2  [  ] Lower limb paralysis                                   2        (unable to hold up >15 seconds)    [  ] Current Cancer                                             2         (within 6 months)  [  ] Immobilization > 24 hrs                              1  [  ] ICU/CCU stay > 24 hours                             1  [  ] Age > 60                                                         1    IMPROVE VTE Score: 1.

## 2019-04-25 NOTE — H&P ADULT - ASSESSMENT
78y M w/ PMH of HTN, DM2 (on home Metformin and glipizide), COPD (2L home/ BIPAP at night), CAD with 3v CABG 2004, HLD, hx hypercapnic resp failure with recent intubation c/b with cardiac arrest (6/2018)  w/ recent admission on 4/4/19 for COPD exacerbation.   Now being admitted for Increased SOB for 2 days, w/out relief from albuterol neb tx/BiPAP therapy, Found to have hypercapnic Respiratory Failure 2/2 to COPD Exacerbation vs. Asthma. Low suspicion for Infectious cause at this time.

## 2019-04-25 NOTE — ED PROVIDER NOTE - PHYSICAL EXAMINATION
GEN: awake, alert, ill appearing, uncomfortable   HENT: atraumatic, normocephalic, VIOLETA, EOMI, no midline instability, oropharynx w/o erythema or exudates, no lymphadenopathy  CV: normal rate and rhythm, S1, S2, no MRG, equal pulses throughout, no JVD  RESP: mod distress, +IWOB, +retraction, wheezing/crackles throughout   ABD: soft, nontender, nondistended, no rebound, no guarding, normoactive bowel sounds, no organomegally  MUSCULOSKELETAL: strenght 5/5 x 4, full range of motion, CMS intact   SKIN: normal color, no turgor, no wounds or rash   NEURO: Awake alert oriented x 3, no facial asymmetry, no slurred speech, no pronator drift, moving all extremities  PSYCH: no suicial ideation, no homicidal ideation, no depression, no anxiety, no hallucination

## 2019-04-25 NOTE — H&P ADULT - PROBLEM SELECTOR PLAN 3
-Pt with SVTs in ED to 170-180's. Improved with cardizem and carotid message.   --Pt with Hx of CABG/CAD  -Pt with Hx of PEA arrest 2/2 to status asthmaticus 10/2018  -EKG w/ Sinus Tachycardia, pt is paced. FU EKG per ICU as needed   -FU Cardiac enzymes now and q 8 hrs x 3  -Pt with Hx HTN  -FU TTE

## 2019-04-25 NOTE — CONSULT NOTE ADULT - ATTENDING COMMENTS
I personally saw and examined the patient in detail.  I have spoken to the above provider regarding the assessment and plan of care.  I reviewed the above assessment and plan of care, and agree.  I have made changes in the body of the note where appropriate.  Patient is critically ill with a high risk of decompensation.  I have personally provided 35 minutes of critical care time excluding time spent on separate procedures.
77 yo M with Hx CAD, CABG, dCHF, asthma and COPD, chronic hypoxemic respiratory failure, prior intubation for status asthmaticus, now presenting with dyspnea and hypercapnic respiratory failure, with asthma exacerbation.  Despite initial ED treatment he has poor air entry and ABG minimally improved after 2hrs.    --admit to ICU   --asthma exacerbation  continue on BiPAP for now  high dose steroids, standing and prn bronchodilators  empiric azithromycin   RVP and sputum Cx pending, legionella Ag, MRSA swab  --CAD, hemodynamically stable   continue home regimen of plavix, statin, lopressor, valsartan (if able to take PO)  --NPO for now  --normal renal function  --DM2, insulin coverage scale  --plan discussed with pt and wife

## 2019-04-25 NOTE — ED PROVIDER NOTE - NS ED ROS FT
GEN: no fever, no chills, no weakness  HENT: no eye pain, no visual changes, no ear pain, no visual or hearing changes, no sore throat, no swelling or neck pain  CV: no chest pain, no palpitations, no dizziness, no swelling  RESP: +coughing, +sob, +IWOB, no LA  GI: no abd pain, no distension, no nausea, no vomiting, no diarrhea, no constipation  : no dysuria,  no frequency, no hematuria, no discharge, no flank pain  MUSCULOSKELETAL: no myalgia, no arthralgia, no joint swelling, no bruising   SKIN: no rash, no wounds, no itching  NEURO: no change in mentation, no visual changes, no HA, no focal weakness, no trouble speaking, no gait abnormalities, no dizziness  PSYCH: no suidical ideation, no homicidal ideation, no depression, no anxiety, no hallucinations

## 2019-04-25 NOTE — H&P ADULT - NSHPLABSRESULTS_GEN_ALL_CORE
LABS:                        12.9   10.00 )-----------( 284      ( 25 Apr 2019 11:40 )             42.5     25 Apr 2019 11:40    141    |  106    |  13     ----------------------------<  195    4.6     |  31     |  1.10     Ca    9.5        25 Apr 2019 11:40      PT/INR - ( 25 Apr 2019 11:40 )   PT: 10.8 sec;   INR: 0.96 ratio         PTT - ( 25 Apr 2019 11:40 )  PTT:36.3 sec          ABG - ( 25 Apr 2019 14:20 )  pH, Arterial: 7.24  pH, Blood: x     /  pCO2: 70    /  pO2: 87    / HCO3: 25    / Base Excess: 2.7   /  SaO2: 95                    < from: Xray Chest 1 View- PORTABLE-Urgent (04.25.19 @ 11:52) >      EXAM:  XR CHEST PORTABLE URGENT 1V                            PROCEDURE DATE:  04/25/2019          INTERPRETATION:  AP semierect chest on April 25, 2019 11:36 AM. Patient   is short of breath. 2 views obtained.    COPD configuration to the chest again noted. Sternotomy again seen. Heart   remains normal in size.    On April 4 this year there was a minimal left base pleural reaction. This   is again noted and suggest chronic etiology.    IMPRESSION: Unchanged chest as above.                REJI ROTHMAN M.D., ATTENDING RADIOLOGIST  This document has been electronically signed. Apr 25 2019 11:54AM    < end of copied text >

## 2019-04-25 NOTE — CONSULT NOTE ADULT - SUBJECTIVE AND OBJECTIVE BOX
CHIEF COMPLAINT: Patient is a 78y old  Male who presents with a chief complaint of dyspnea (25 Apr 2019 15:09)      HPI:  78 y Male with a PMHx of HTN, COPD (On home O2 2L and BIPAP at Nighttime), CAD w/ 3v CABG in '04, HLD, DM2 (on Metformin/Glipizide), hx Hypercapnic Resp Failure/Cardiac Arrest requiring intubation (6/'18), Presents to the ED for increased SOB for the past 2 days. Pt was recently admitted to [facility] on 4/4/19 for SOB/Resp Failure, treated with abx/Steroid therapy and BIPAP. Pt was Discharged from the Hospital on 4/7/19 and recommended to FU with Primary care physician and cardiologist. Pt and Wife at bedside report he has an appointment with his PCP this coming Monday. Per Wife, Pt was brought to the ED today by EMS for persistant worsening SOB, s/p 3-4 nebulizer treatments overnight along with BIPAP. Pt reports of having sob/trouble breathing at baseline, however usually BIPAP, along with nebulized treatments have helped with his symptoms. Due to his worsening SOB for the past 2 days, both patient and wife decided he needed to go to nearest ED. Pt Denies any recent sick contacts. Pt denies Fever, Night Sweats, Chills, n/v. Pt reports of SOB w/ trouble breathing and wheezing. Endorses of a cough, however non productive. No other complaints at this time.       In ED  Upon Evaluation: Vitals /68, , RR 28, O2sat 100%, Patient On BiPAP, Had an episode Of SVTs in the 180's, where Carotid Message was performed with resolution to HR of 110's.   -Pt evaluated by the critical care team where vitals demonstrated: T 98  Afib  then came down to 107 after cardizem 10 mg IV  BP 91/43 then 128/76 RR 18 O2 sat 97 on RA  Labs significant for serum CO2 34, glucose 170  Most recent ABG pH 7.24 pCO2 70 pO2 87 HCO3 25 Base excess 2.7   CXR No focal consolidation or pleural effusion, Flattened Diaphragm  Viral Panel/Flu: Negative  BCx/UCx/Sputum Cx ordered   12 lead EKG shows Sinus Tachycardia   Received: Azithromycin x1 dose, albuterol x1, duonebs x1, budesonide x1, cardizem 10 mg IV x1, Magnesium sulfate 2 g IV x1, solumedrol 125 mg IV x1 (25 Apr 2019 15:09)      PAST MEDICAL & SURGICAL HISTORY:  PVD (peripheral vascular disease)  Hypertension  COPD (chronic obstructive pulmonary disease)  Osteomyelitis  Dyslipidemia  CAD (Coronary Artery Disease)  Diabetes Mellitus, Type II  Compound fracture: left leg  CABG (Coronary Artery Bypass Graft)      SOCIAL HISTORY:  No tobacco, ethanol, or drug abuse.    FAMILY HISTORY:  Family history of diabetes mellitus (Sibling)    No family history of acute MI or sudden cardiac death.    MEDICATIONS  (STANDING):  ALBUTerol/ipratropium for Nebulization 3 milliLiter(s) Nebulizer every 6 hours  atorvastatin 40 milliGRAM(s) Oral at bedtime  azithromycin  IVPB      buDESOnide 160 MICROgram(s)/formoterol 4.5 MICROgram(s) Inhaler 2 Puff(s) Inhalation two times a day  clopidogrel Tablet 75 milliGRAM(s) Oral daily  heparin  Injectable 5000 Unit(s) SubCutaneous every 8 hours  insulin lispro (HumaLOG) corrective regimen sliding scale   SubCutaneous every 4 hours  methylPREDNISolone sodium succinate Injectable 60 milliGRAM(s) IV Push every 6 hours  metoprolol tartrate 12.5 milliGRAM(s) Oral two times a day  tamsulosin 0.4 milliGRAM(s) Oral at bedtime  valsartan 80 milliGRAM(s) Oral daily    MEDICATIONS  (PRN):  ALBUTerol/ipratropium for Nebulization 3 milliLiter(s) Nebulizer every 2 hours PRN Shortness of Breath and/or Wheezing      Allergies    No Known Allergies    Intolerances    shellfish (Nausea)      REVIEW OF SYSTEMS:    CONSTITUTIONAL: No weakness, fevers or chills  EYES/ENT: No visual changes;  No vertigo or throat pain   NECK: No pain or stiffness  RESPIRATORY: No cough, wheezing, hemoptysis; No shortness of breath  CARDIOVASCULAR: No chest pain or palpitations  GASTROINTESTINAL: No abdominal pain. No nausea, vomiting, or hematemesis; No diarrhea or constipation. No melena or hematochezia.  GENITOURINARY: No dysuria, frequency or hematuria  NEUROLOGICAL: No numbness or weakness  SKIN: No itching or rash  All other review of systems is negative unless indicated above    VITAL SIGNS:   Vital Signs Last 24 Hrs  T(C): 36.8 (25 Apr 2019 16:35), Max: 36.8 (25 Apr 2019 16:35)  T(F): 98.3 (25 Apr 2019 16:35), Max: 98.3 (25 Apr 2019 16:35)  HR: 115 (25 Apr 2019 16:22) (115 - 138)  BP: 174/78 (25 Apr 2019 16:22) (132/73 - 174/78)  BP(mean): 112 (25 Apr 2019 16:22) (112 - 112)  RR: 33 (25 Apr 2019 16:22) (17 - 34)  SpO2: 98% (25 Apr 2019 16:22) (96% - 100%)    I&O's Summary      On Exam:    Constitutional: NAD, alert and oriented x 3  Lungs:  Non-labored, breath sounds with Bilaterally diminished at bases  No  wheezes, crackles or rhonchi   Cardiovascular: RRR.  S1 and S2 positive.  No murmurs, rubs, gallops or clicks  Gastrointestinal: Bowel Sounds present, soft, nontender.   Lymph: No peripheral edema. No cervical lymphadenopathy.  Neurological: Alert, no focal deficits  Skin: No rashes or ulcers   Psych:  Mood & affect appropriate.    LABS: All Labs Reviewed:                        12.1   9.42  )-----------( 264      ( 25 Apr 2019 16:22 )             39.3                         12.9   10.00 )-----------( 284      ( 25 Apr 2019 11:40 )             42.5     25 Apr 2019 16:22    140    |  105    |  12     ----------------------------<  236    4.9     |  32     |  1.10   25 Apr 2019 11:40    141    |  106    |  13     ----------------------------<  195    4.6     |  31     |  1.10     Ca    9.0        25 Apr 2019 16:22  Ca    9.5        25 Apr 2019 11:40  Phos  3.0       25 Apr 2019 16:22  Mg     2.0       25 Apr 2019 16:22    TPro  6.6    /  Alb  3.5    /  TBili  0.4    /  DBili  x      /  AST  13     /  ALT  23     /  AlkPhos  90     25 Apr 2019 16:22    PT/INR - ( 25 Apr 2019 16:22 )   PT: 11.3 sec;   INR: 1.00 ratio         PTT - ( 25 Apr 2019 16:22 )  PTT:37.8 sec  CARDIAC MARKERS ( 25 Apr 2019 16:22 )  <.015 ng/mL / x     / 69 U/L / x     / 2.9 ng/mL  CARDIAC MARKERS ( 25 Apr 2019 11:40 )  <.015 ng/mL / x     / x     / x     / 2.7 ng/mL      Blood Culture:   04-25 @ 11:40  Pro Bnp 102        RADIOLOGY:  < from: Xray Chest 1 View- PORTABLE-Urgent (04.25.19 @ 11:52) >  EXAM:  XR CHEST PORTABLE URGENT 1V                            PROCEDURE DATE:  04/25/2019          INTERPRETATION:  AP semierect chest on April 25, 2019 11:36 AM. Patient   is short of breath. 2 views obtained.    COPD configuration to the chest again noted. Sternotomy again seen. Heart   remains normal in size.    On April 4 this year there was a minimal left base pleural reaction. This   is again noted and suggest chronic etiology.    IMPRESSION: Unchanged chest as above.                REJI ROTHMAN M.D., ATTENDING RADIOLOGIST  This document has been electronically signed. Apr 25 2019 11:54AM    < end of copied text >    EKG:  < from: 12 Lead ECG (04.06.19 @ 17:24) >  Ventricular Rate 103 BPM    Atrial Rate 103 BPM    P-R Interval 132 ms    QRS Duration 90 ms    Q-T Interval 328 ms    QTC Calculation(Bezet) 429 ms    P Axis 77 degrees    R Axis 83 degrees    T Axis 54 degrees    Diagnosis Line Sinus tachycardia  Otherwise normal ECG    Confirmed by Tito Kelly ([id number]) on 4/7/2019 11:40:55 AM    < end of copied text >    < from: TTE Echo Doppler w/o Cont (03.12.19 @ 20:42) >  EXAM:  ECHO TTE WO CON COMP W DOPPLR         PROCEDURE DATE:  03/12/2019        INTERPRETATION:  Ordering Physician: MARTÍN CARRASQUILLO [id number]    Indication: Shortness of breath    Study Quality: Technically difficult   A complete echocardiographic study was performed utilizing standard   protocol including spectral and color Doppler in all echocardiographic   windows.    Height: 175 cm  Weight: 113 kg  BSA: 2.2 sq m  Blood Pressure: 103/61    MEASUREMENTS  IVS: 1.5cm  PWT: 1.1cm  LA: 3.1cm  AO: 3.3cm  LVIDd: 4.2cm  LVIDs: 3.3cm    RVSP: 29mmHg    FINDINGS  Left Ventricle: The endocardium is not well-visualized. In limited views,   the LV function appears to be preserved with an estimated EF of 55%.   There is paradoxical motion of the septum in the setting of prior cardiac   surgery.  Aortic Valve: Calcified aortic valve with normal opening. Trace aortic   regurgitation  Mitral Valve: Thickened and calcified mitral valve leaflets with trace to   mild mitral regurgitation  Tricuspid Valve: Grossly normal tricuspid valve. Mild tricuspid   regurgitation.  Pulmonic Valve: Not well-visualized.  Left Atrium: Grossly normal. An interatrial septal aneurysm is noted   without obvious color flow across it  Right Ventricle: In limited views, grossly normal RV size.  Right Atrium: Grossly normal  Diastolic Function: Doppler evidence of rate 1 diastolic dysfunction  Pericardium/Pleura: No significant pericardial effusion.  Hemodynamics: The pulmonary artery systolic pressure is estimated to be   29 mmHg, assuming the right atrial pressure is equal to 8 mmHg,   consistent with normal pulmonary pressures.    CONCLUSIONS:  1. Technically difficult and limited study  2. The endocardium is not well-visualized. In limited views, the LV   function appears to be preserved with an estimated EF of 55%. There is   paradoxical motion of the septum in the setting of prior cardiac surgery.  3. Calcified aortic valve with normal opening. Trace aortic regurgitation  4. Thickened and calcified mitral valve leaflets with trace to mild   mitral regurgitation  5. Doppler evidence of rate 1 diastolic dysfunction  6. The interatrial septum appears aneurysmal  7. No significant pericardial effusion.                      REJI VILA   This document has been electronically signed. Mar 14 2019 10:16AM    < end of copied text >

## 2019-04-25 NOTE — CONSULT NOTE ADULT - SUBJECTIVE AND OBJECTIVE BOX
Date/Time Patient Seen:  		  Referring MD:   Data Reviewed	       Patient is a 78y old  Male who presents with a chief complaint of dyspnea (25 Apr 2019 16:58)      Subjective/HPI  in bed  seen and examined  vs and meds reviewed  labs reviewed  H and P reviewed  ICU notes reviewed  pt is known to me from prior admissions    History and Physical:   Outpatient Providers	PCP: Dr. Sarah Avila  Cardio: Dr. Rod  Pulm: Dr. Dejesus       History of Present Illness:  Reason for Admission: dyspnea  History of Present Illness:   78 y Male with a PMHx of HTN, COPD (On home O2 2L and BIPAP at Nighttime), CAD w/ 3v CABG in '04, HLD, DM2 (on Metformin/Glipizide), hx Hypercapnic Resp Failure/Cardiac Arrest requiring intubation (6/'18), Presents to the ED for increased SOB for the past 2 days. Pt was recently admitted to [facility] on 4/4/19 for SOB/Resp Failure, treated with abx/Steroid therapy and BIPAP. Pt was Discharged from the Hospital on 4/7/19 and recommended to FU with Primary care physician and cardiologist. Pt and Wife at bedside report he has an appointment with his PCP this coming Monday. Per Wife, Pt was brought to the ED today by EMS for persistant worsening SOB, s/p 3-4 nebulizer treatments overnight along with BIPAP. Pt reports of having sob/trouble breathing at baseline, however usually BIPAP, along with nebulized treatments have helped with his symptoms. Due to his worsening SOB for the past 2 days, both patient and wife decided he needed to go to nearest ED. Pt Denies any recent sick contacts. Pt denies Fever, Night Sweats, Chills, n/v. Pt reports of SOB w/ trouble breathing and wheezing. Endorses of a cough, however non productive. No other complaints at this time.      PAST MEDICAL & SURGICAL HISTORY:  PVD (peripheral vascular disease)  Hypertension  Diabetes mellitus  COPD (chronic obstructive pulmonary disease)  Osteomyelitis  Asymptomatic Peripheral Vascular Disease  Dyslipidemia  CAD (Coronary Artery Disease)  Diabetes Mellitus, Type II  Compound fracture: left leg  CABG (Coronary Artery Bypass Graft)        Medication list         MEDICATIONS  (STANDING):  ALBUTerol/ipratropium for Nebulization 3 milliLiter(s) Nebulizer every 6 hours  atorvastatin 40 milliGRAM(s) Oral at bedtime  azithromycin  IVPB      buDESOnide 160 MICROgram(s)/formoterol 4.5 MICROgram(s) Inhaler 2 Puff(s) Inhalation two times a day  clopidogrel Tablet 75 milliGRAM(s) Oral daily  heparin  Injectable 5000 Unit(s) SubCutaneous every 8 hours  insulin lispro (HumaLOG) corrective regimen sliding scale   SubCutaneous every 4 hours  methylPREDNISolone sodium succinate Injectable 60 milliGRAM(s) IV Push every 6 hours  metoprolol tartrate 12.5 milliGRAM(s) Oral two times a day  tamsulosin 0.4 milliGRAM(s) Oral at bedtime  valsartan 80 milliGRAM(s) Oral daily    MEDICATIONS  (PRN):  ALBUTerol    0.083% 2.5 milliGRAM(s) Nebulizer every 2 hours PRN Shortness of Breath and/or Wheezing         Vitals log        ICU Vital Signs Last 24 Hrs  T(C): 36.8 (25 Apr 2019 16:35), Max: 36.8 (25 Apr 2019 16:35)  T(F): 98.3 (25 Apr 2019 16:35), Max: 98.3 (25 Apr 2019 16:35)  HR: 100 (25 Apr 2019 18:09) (100 - 138)  BP: 129/68 (25 Apr 2019 17:00) (129/68 - 174/78)  BP(mean): 112 (25 Apr 2019 16:22) (112 - 112)  ABP: --  ABP(mean): --  RR: 30 (25 Apr 2019 17:00) (17 - 34)  SpO2: 99% (25 Apr 2019 18:09) (96% - 100%)           Input and Output:  I&O's Detail      Lab Data                        12.1   9.42  )-----------( 264      ( 25 Apr 2019 16:22 )             39.3     04-25    140  |  105  |  12  ----------------------------<  236<H>  4.9   |  32<H>  |  1.10    Ca    9.0      25 Apr 2019 16:22  Phos  3.0     04-25  Mg     2.0     04-25    TPro  6.6  /  Alb  3.5  /  TBili  0.4  /  DBili  x   /  AST  13<L>  /  ALT  23  /  AlkPhos  90  04-25    ABG - ( 25 Apr 2019 17:44 )  pH, Arterial: 7.31  pH, Blood: x     /  pCO2: 59    /  pO2: 95    / HCO3: 26    / Base Excess: 3.2   /  SaO2: 97                CARDIAC MARKERS ( 25 Apr 2019 16:22 )  <.015 ng/mL / x     / 69 U/L / x     / 2.9 ng/mL  CARDIAC MARKERS ( 25 Apr 2019 11:40 )  <.015 ng/mL / x     / x     / x     / 2.7 ng/mL        Review of Systems	      Objective     Physical Examination    heart s1s2  lung dec BS  abd soft      Pertinent Lab findings & Imaging      Gonzáles:  NO   Adequate UO     I&O's Detail           Discussed with:     Cultures:	        Radiology

## 2019-04-25 NOTE — ED ADULT NURSE NOTE - ED STAT RN HANDOFF DETAILS 3
attempted to give report to ICU again stated nurse is off the unit & will call me back when she gets back

## 2019-04-25 NOTE — CONSULT NOTE ADULT - SUBJECTIVE AND OBJECTIVE BOX
Chart reviewed Detailed consult will be inserted below Chart reviewed Detailed consult will be inserted below     COMMODORE TURNER [facility]    ALLERGY Shellfish  CONTACT Sp Amira EATON    Initial evaluation/Pulmonary Critical Care consultation requested on 4/25/2019 by Dr Dillon    from Dr Dejesus   Patient examined chart reviewed    HOSPITAL ADMISSION  4/4/2019 [facility] DR MAYNOR THOMPSON  PATIENT CAME  FROM (if information available)        TYPE OF VISIT      Initial evaluation/Pulmonary Critical Care consultation requested on 4/25/2019 by Dr Dillon    from Dr Dejesus       REASON FOR VISIT  Please see problem list          PATIENT DESCRIPTION PATIENT COMMODORE TURNER 4/25/2019 ADMISSION [facility] HOSPITALIST     · Chief Complaint: The patient is a 78y Male complaining of difficulty breathing.  · HPI Objective Statement: 77yo M h/o htn, dm ,cad cabg, PVD, copd O2 dependent p/w dyspanea cough x 2 days. denies f/c/n/v/d/cp.    PAST MEDICAL/SURGICAL/FAMILY/SOCIAL HISTORY:    Past Medical History:  CAD (Coronary Artery Disease)    COPD (chronic obstructive pulmonary disease)    Diabetes Mellitus, Type II    Dyslipidemia    Hypertension    Osteomyelitis    PVD (peripheral vascular disease).     Past Surgical History:  CABG (Coronary Artery Bypass Graft)    Compound fracture  left leg.     Tobacco Usage:  · Tobacco Usage Unknown if ever smoked    ALLERGIES AND HOME MEDICATIONS:   Allergies:        Allergies:  No Known Allergies:        Intolerances:  shellfish: Food, Nausea, feels nauseous when eating crabs or shrimp but no true allergic reaction    Home Medications:   * Patient Currently Takes Medications as of 07-Apr-2019 13:32 documented in Structured Notes  · Deltasone 20 mg oral tablet: 1 tab(s) orally once a day (at bedtime) -for bladder spasms - for bronchospasm   · levoFLOXacin 750 mg oral tablet: 1 tab(s) orally every 24 hours -for bronchospasm   · valsartan 80 mg oral tablet: 1 tab(s) orally once a day  · Multiple Vitamins oral tablet: 1 tab(s) orally once a day  · metoprolol tartrate 25 mg oral tablet: 0.5 tab(s) orally 2 times a day  · clopidogrel 75 mg oral tablet: 1 tab(s) orally once a day  · aspirin 81 mg oral delayed release tablet: 1 tab(s) orally once a day  · atorvastatin 40 mg oral tablet: 1 tab(s) orally once a day  · glipiZIDE 2.5 mg oral tablet, extended release: 1 tab(s) orally once a day  · tamsulosin 0.4 mg oral capsule: 1 cap(s) orally once a day (at bedtime)  · Breo Ellipta 100 mcg-25 mcg/inh inhalation powder: 1 puff(s) inhaled once a day  · Ventolin HFA 90 mcg/inh inhalation aerosol: 2 puff(s) inhaled 4 times a day  · Incruse Ellipta 62.5 mcg/inh inhalation powder: 1 puff(s) inhaled once a day  · roflumilast 500 mcg oral tablet: 1 tab(s) orally once a day  · metFORMIN 1000 mg oral tablet: 1 tab(s) orally 2 times a day    REVIEW OF SYSTEMS:    Review of Systems:  · Review of Systems: GEN: no fever, no chills, no weakness  HENT: no eye pain, no visual changes, no ear pain, no visual or hearing changes, no sore throat, no swelling or neck pain  CV: no chest pain, no palpitations, no dizziness, no swelling  RESP: +coughing, +sob, +IWOB, no LA  GI: no abd pain, no distension, no nausea, no vomiting, no diarrhea, no constipation  : no dysuria,  no frequency, no hematuria, no discharge, no flank pain  MUSCULOSKELETAL: no myalgia, no arthralgia, no joint swelling, no bruising   SKIN: no rash, no wounds, no itching  NEURO: no change in mentation, no visual changes, no HA, no focal weakness, no trouble speaking, no gait abnormalities, no dizziness  PSYCH: no suidical ideation, no homicidal ideation, no depression, no anxiety, no hallucinations    VITAL SIGNS( Pullset):    ,,ED ADULT Flow Sheet:    25-Apr-2019 11:01  · Temp (F): 98.1  · Temp (C) Temp (C): 36.7  · Temp site Temp Site: oral  · Heart Rate Heart Rate (beats/min): 118  · BP Systolic Systolic: 132  · BP Diastolic Diastolic (mm Hg): 73  · Respiration Rate (breaths/min) Respiration Rate (breaths/min): 17  · SpO2 (%) SpO2 (%): 100  · O2 delivery Patient On: supplemental O2  · Dosing Weight (KILOGRAMS) Dosing Weight (KILOGRAMS): 90.7  · Dosing Weight  (POUNDS) Dosing Weight (POUNDS): 199.9  · Height (FEET) Height (FEET): 5  · Height (INCHES) Height (INCHES): 11  · Height (CENTIMETERS) Height (CENTIMETERS): 180.34  · BSA (m2): 2.11  · BMI (kG/m2) BMI (kG/m2): 27.9  · SpO2 (%) SpO2 (%): 100  · O2 delivery Patient On: supplemental O2    11:04  · Presence of Pain: denies pain/discomfort  · Pain Rating (0-10): Rest: 0  · Pain Rating (0-10): Activity: 0  · Preferred Language to Address Healthcare Preferred Language to Address Healthcare: English    PHYSICAL EXAM:   · Physical Examination: GEN: awake, alert, ill appearing, uncomfortable   HENT: atraumatic, normocephalic, VIOLETA, EOMI, no midline instability, oropharynx w/o erythema or exudates, no lymphadenopathy  CV: normal rate and rhythm, S1, S2, no MRG, equal pulses throughout, no JVD  RESP: mod distress, +IWOB, +retraction, wheezing/crackles throughout   ABD: soft, nontender, nondistended, no rebound, no guarding, normoactive bowel sounds, no organomegally  MUSCULOSKELETAL: strenght 5/5 x 4, full range of motion, CMS intact   SKIN: normal color, no turgor, no wounds or rash   NEURO: Awake alert oriented x 3, no facial asymmetry, no slurred speech, no pronator drift, moving all extremities  PSYCH: no suicial ideation, no homicidal ideation, no depression, no anxiety, no hallucination           VITALS/LABS PATIENT  COMMODORE YUE 4/25/2019 ADMISSION [facility] HOSPITALIST       4/25/2019 W 10 Hb 12.9 Plt 284 INR .9 Na 141 K 4.6 CO2 31 Cr 1.1   4/25/2019 la .6   4/25/2019 120 bpap 10/6/.3 723/74/76  4/25/2019 flu ab n rsv n  4/25/2019 cxr chr changes     REVIEW OF SYMPTOMS    Able to give ROS  NO     PHYSICAL EXAM    HEENT Unremarkable PERRLA atraumatic   RESP Fair air entry EXP prolonged    Harsh breath sound Resp distres mild   CARDIAC S1 S2 No S3     NO JVD    ABDOMEN SOFT BS PRESENT NOT DISTENDED No hepatosplenomegaly PEDAL EDEMA present No calf tenderness  NO rash   GENERAL Not TOXIC looki

## 2019-04-25 NOTE — H&P ADULT - NSHPSOCIALHISTORY_GEN_ALL_CORE
Lives with wife, ambulates independently at baseline.  	Former smoker (quit 25 years ago), 1ppd/20 years.  	Denies EtOH, recreational drug use.  Got flu shot this year. Lives with wife, ambulates independently at baseline.  Former smoker (quit 25 years ago), 1ppd/20 years.  Denies EtOH, recreational drug use.  Got flu shot this year.

## 2019-04-25 NOTE — CONSULT NOTE ADULT - ASSESSMENT
78 yr old male with stated Hx significant for COPD, Asthma on home O2/Bipap therapy, CAD, CABG '04, PVD s/p stents (remote), frequent hospitalizations for COPD/Asthma exacerbations with most recent beginning of April 2019 who now presents with progressive sob/wheezes over past 24 hrs without relief from albuterol neb tx and bipap therapy. Found to be in hypercapnic resp failure secondary to COPD exacerbation vs asthma exacerbation Consulted for tachycardia     Pt has been asymptomatic   -there is no evidence of acute ischemia.  -CE negative x2  - ECG ST @ 103 w/o ischemic changes     -there is no evidence of significant arrhythmia.  - ECG ST @ 103 w/o ischemic changes   - ST on monitor most likely reactive in nature  - CW home dose metropolol, plavix, statin, valsartan    -there is no evidence for meaningful  volume overload.  - , chest xray unchaged from 4/4 showing minimal L pleural reaction chronic in nature  - TTE from 3/12/19  shows EF 55% w/ mild MR and trace Ar w/ Grade 1 DD no need to repeat at this time    -Pt is hemodynamically stable  -Monitor and replete lytes, keep K>4 and Mg >2  Thank you for the consult  Will continue to follow  Austin GUY 78 yr old male with stated Hx significant for COPD, Asthma on home O2/Bipap therapy, CAD, CABG '04, PVD s/p stents (remote), frequent hospitalizations for COPD/Asthma exacerbations with most recent beginning of April 2019 who now presents with progressive sob/wheezes over past 24 hrs without relief from albuterol neb tx and bipap therapy. Found to be in hypercapnic resp failure secondary to COPD exacerbation vs asthma exacerbation Consulted for tachycardia     Pt has been asymptomatic   -there is no evidence of acute ischemia.  -CE negative x2  - ECG ST @ 103 w/o ischemic changes     -there is no evidence of significant arrhythmia.  - ECG ST @ 103 w/o ischemic changes   - ST on monitor most likely reactive in nature  - CW home dose metropolol, plavix, statin, valsartan    -there is no evidence for meaningful  volume overload.  - , chest xray unchaged from 4/4 showing minimal L pleural reaction chronic in nature  - TTE from 3/12/19  shows EF 55% w/ mild MR and trace Ar w/ Grade 1 DD no need to repeat at this time    -Pt is hemodynamically stable  -Monitor and replete lytes, keep K>4 and Mg >2  - Further cardiac workup will depend on clinical course.   - All other workup per ICU team  - DVT ppx  Thank you for the consult  Will continue to follow  Austin GUY   Cardiology 78 yr old male with stated Hx significant for COPD, Asthma on home O2/Bipap therapy, CAD, CABG '04, PVD s/p stents (remote), frequent hospitalizations for COPD/Asthma exacerbations with most recent beginning of April 2019 who now presents with progressive sob/wheezes over past 24 hrs without relief from albuterol neb tx and bipap therapy. Found to be in hypercapnic resp failure secondary to COPD exacerbation vs asthma exacerbation Consulted for tachycardia     Pt has been asymptomatic   -there is no evidence of acute ischemia.  -CE negative x2  - ECG ST @ 103 w/o ischemic changes  - Tachycardia likely reactive    - Continue BIPAP for respiratory support for acute hypercapenic respiratory failure  - Monitor respiratory status closely.  Hopefully will avoid intubation    -there is no evidence of significant arrhythmia.  - ECG ST @ 103 w/o ischemic changes   - ST on monitor most likely reactive in nature  - CW home dose metropolol, plavix, statin, valsartan  - Reportedly had SVT in ED that responded to carotid massage and IV dilt.  This was likely reactive as well.  No strips available for review    -there is no evidence for meaningful  volume overload.  - , chest xray unchaged from 4/4 showing minimal L pleural reaction chronic in nature  - TTE from 3/12/19  shows EF 55% w/ mild MR and trace Ar w/ Grade 1 DD no need to repeat at this time    -Pt is hemodynamically stable  -Monitor and replete lytes, keep K>4 and Mg >2  - Further cardiac workup will depend on clinical course.   - All other workup per ICU team  - DVT ppx  Thank you for the consult  Will continue to follow  Austin GUY   Cardiology

## 2019-04-25 NOTE — H&P ADULT - HISTORY OF PRESENT ILLNESS
78 y Male with a PMHx of HTN, COPD (On home O2 2L and BIPAP at Nighttime), CAD w/ 3v CABG in '04, HLD, DM2 (on Metformin/Glipizide), hx Hypercapnic Resp Failure/Cardiac Arrest requiring intubation (6/'18), Presents to the ED for increased SOB for the past 2 days. Pt was recently admitted to [facility] on 4/4/19 for SOB/Resp Failure, treated with abx/Steroid therapy and BIPAP. Pt was Discharged from the Hospital on 4/7/19 and recommended to FU with Primary care physician and cardiologist. Pt and Wife at bedside report he has an appointment with his PCP this coming Monday. Per Wife, Pt was brought to the ED today by EMS for persistant worsening SOB, s/p 3-4 nebulizer treatments overnight along with BIPAP. Pt reports of having sob/trouble breathing at baseline, however usually BIPAP, along with nebulized treatments have helped with his symptoms. Due to his worsening SOB for the past 2 days, both patient and wife decided he needed to go to nearest ED. Pt Denies any recent sick contacts. Pt denies Fever, Night Sweats, Chills, n/v. Pt reports of SOB w/ trouble breathing and wheezing. Endorses of a cough, however non productive. No other complaints at this time.           In ED  Upon Evaluation: Vitals /68, , RR 28, O2sat 100%, Patient On BiPAP, Had an episode Of SVTs in the 180's, where Carotid Message was performed with resolution to HR of 110's.   -Pt evaluated by the critical care team where vitals demonstrated: T 98  Afib  then came down to 107 after cardizem 10 mg IV  BP 91/43 then 128/76 RR 18 O2 sat 97 on RA  Labs significant for serum CO2 34, glucose 170  Most recent ABG pH 7.24 pCO2 70 pO2 87 HCO3 25 Base excess 2.7   CXR No focal consolidation or pleural effusion, Flattened Diaphragm  Viral Panel/Flu: Negative  BCx/UCx/Sputum Cx ordered   12 lead EKG shows Sinus Tachycardia   Received: Azithromycin x1 dose, albuterol x1, duonebs x1, budesonide x1, cardizem 10 mg IV x1, Magnesium sulfate 2 g IV x1, solumedrol 125 mg IV x1

## 2019-04-25 NOTE — ED ADULT NURSE REASSESSMENT NOTE - NS ED NURSE REASSESS COMMENT FT1
dr Sidhu @ bedside Pt cont to be SOB & Wheezing Pt & wife updated to pts status Pt to be admitted to ICU

## 2019-04-25 NOTE — CONSULT NOTE ADULT - ASSESSMENT
Assessment:    Plan:    #Neuro:  -Neuro checks q 4 hrs and prn  -  #Pulm:  -Bipap therapy - titrate to ph 7.35 - 7.45; PCO2 35 - 45; SPO2 > 88  -Duoneb therapy q 6 hrs  -albuterol nebulizer therapy q 1 hr prn wheezes and sob  #CV:    #GI/:    #ID:    #FEN/ENDO/HEME:          Critical Care Time: 40minutes   Reviewing data, imaging, discussing with multidisciplinary team, not inclusive of procedures, discussing goals of care with family Assessment:    Plan:    #Neuro:  -Neuro checks q 4 hrs and prn  -  #Pulm:  -Bipap therapy - titrate to ph 7.35 - 7.45; PCO2 35 - 45; SPO2 > 88  -Pt with diffuse wheezes  -Mg  -Duoneb therapy q 6 hrs  -albuterol nebulizer therapy q 1 hr prn wheezes and sob  #CV:    #GI/:    #ID:    #FEN/ENDO/HEME:          Critical Care Time: 40minutes   Reviewing data, imaging, discussing with multidisciplinary team, not inclusive of procedures, discussing goals of care with family Assessment:  78 yr old male with stated Hx significant for COPD, Asthma on home O2/Bipap therapy, CAD, CABG '04, PVD s/p stents (remote), frequent hospitalizations for COPD/Asthma exacerbations with most recent beginning of April 2019 who now presents with progressive sob/wheezes over past 24 hrs without relief from albuterol neb tx and bipap therapy. found to be in hypercapnic resp failure secondary to COPD exacerbation vs asthma exacerbation    Plan:    #Neuro:  -Neuro checks q 4 hrs and prn  -  #Pulm:  -Bipap therapy - titrate to ph 7.35 - 7.45; PCO2 35 - 45; SPO2 > 88  -Pt with diffuse wheezes  -MgSO4 1 gm IV x 1  -Duoneb therapy q 6 hrs  -albuterol nebulizer therapy q 1 hr prn wheezes and sob  -will increase solumedrol to 60 mg IV q 6 hr    #CV:  -Pt with Hx of CABG/CAD  -Pt with Hx of PEA arrest 2/2 to status asthmaticus 10/2018  -obtain ecg now and q am  -obtain Cardiac enzymes now and q 8 hrs x 3  -Pt with Hx HTN  -home med of valsartan/atorvastatin/metoprolol/plavix- will continue  -when stable obtain TTE to eval for progression of     #GI/:  -NPO except for meds while on Bipap therapy  -protonix 40 mg IV qd - d/c when taking po food  -pt with Hx of BPH  -home med of tamsulosin - will continue  -strict I & O's - keep even    #ID:  -pan culture for surveillance  -RVP - neg  -obtain urine for legionella  -continue azithromax 500 mg IV qd     #FEN/ENDO/HEME:  -Pt with Hx DM2 on metformin/glipizide  -will hold metformin/glipizide at present - resume when stable and taking po  -POC glucose with ISS q 4 hrs - maintain glucose 140 - 160  -cmp/mg++/po--4/cbc with diff/coags now and q am  -abg prn  -DVT prophylaxis with heparin 5000 units subq q 8 hrs          Critical Care Time: 40minutes   Reviewing data, imaging, discussing with multidisciplinary team, not inclusive of procedures, discussing goals of care with family Assessment:  78 yr old male with stated Hx significant for COPD, Asthma on home O2/Bipap therapy, CAD, CABG '04, PVD s/p stents (remote), frequent hospitalizations for COPD/Asthma exacerbations with most recent beginning of April 2019 who now presents with progressive sob/wheezes over past 24 hrs without relief from albuterol neb tx and bipap therapy. found to be in hypercapnic resp failure secondary to COPD exacerbation vs asthma exacerbation    Plan:    #Neuro:  -Neuro checks q 4 hrs and prn  -bedrest at present  -physical therapy when stable    #Pulm:  -Bipap therapy - titrate to ph 7.35 - 7.45; PCO2 35 - 45; SPO2 > 88  -Pt with diffuse wheezes  -MgSO4 1 gm IV x 1  -Duoneb therapy q 6 hrs  -albuterol nebulizer therapy q 1 hr prn wheezes and sob  -will increase solumedrol to 60 mg IV q 6 hr    #CV:  -Pt with Hx of CABG/CAD  -Pt with Hx of PEA arrest 2/2 to status asthmaticus 10/2018  -obtain ecg now and q am  -obtain Cardiac enzymes now and q 8 hrs x 3  -Pt with Hx HTN  -home med of valsartan/atorvastatin/metoprolol/plavix- will continue  -when stable obtain TTE to eval for progression of     #GI/:  -NPO except for meds while on Bipap therapy  -protonix 40 mg IV qd - d/c when taking po food  -pt with Hx of BPH  -home med of tamsulosin - will continue  -strict I & O's - keep even    #ID:  -pan culture for surveillance  -RVP - neg  -obtain urine for legionella  -continue azithromax 500 mg IV qd     #FEN/ENDO/HEME:  -Pt with Hx DM2 on metformin/glipizide  -will hold metformin/glipizide at present - resume when stable and taking po  -POC glucose with ISS q 4 hrs - maintain glucose 140 - 160  -cmp/mg++/po--4/cbc with diff/coags now and q am  -abg prn  -DVT prophylaxis with heparin 5000 units subq q 8 hrs          Critical Care Time: 40minutes   Reviewing data, imaging, discussing with multidisciplinary team, not inclusive of procedures, discussing goals of care with family

## 2019-04-25 NOTE — CONSULT NOTE ADULT - SUBJECTIVE AND OBJECTIVE BOX
CHIEF COMPLAINT:    HPI:  78 yr old male with PMHx HTN, HLD, CAD, CABG '04, PVD s/p stents, DM2 on metformin/glypizide, COPD on home O2 and Bipap qhs, Asthma, frequent admissions for hypoxic/hypercapnic resp failure 2/2 to COPD exacerbation where one episode was c/b bradycardia/PEA arrest (6/2018). Most recent hospitalization 4/4 - 7/19. Pt now presents to E.D.          PAST MEDICAL & SURGICAL HISTORY:  PVD (peripheral vascular disease)  Hypertension  COPD (chronic obstructive pulmonary disease)  Osteomyelitis  Dyslipidemia  CAD (Coronary Artery Disease)  Diabetes Mellitus, Type II  Compound fracture: left leg  CABG (Coronary Artery Bypass Graft)      FAMILY HISTORY:  Family history of diabetes mellitus (Sibling)      SOCIAL HISTORY:  Smoking: [ ] Never Smoked [ ] Former Smoker (__ packs x ___ years) [ ] Current Smoker  (__ packs x ___ years)  Substance Use: [ ] Never Used [ ] Used ____  EtOH Use:  Marital Status: [ ] Single [ ]  [ ]  [ ]   Sexual History:   Occupation:  Recent Travel:  Country of Birth:  Advance Directives:    Allergies    No Known Allergies    Intolerances    shellfish (Nausea)      HOME MEDICATIONS:    REVIEW OF SYSTEMS:      OBJECTIVE:  ICU Vital Signs Last 24 Hrs  T(C): 36.7 (25 Apr 2019 11:01), Max: 36.7 (25 Apr 2019 11:01)  T(F): 98.1 (25 Apr 2019 11:01), Max: 98.1 (25 Apr 2019 11:01)  HR: 123 (25 Apr 2019 12:30) (118 - 138)  BP: 161/66 (25 Apr 2019 12:30) (132/73 - 161/66)  BP(mean): --  ABP: --  ABP(mean): --  RR: 31 (25 Apr 2019 12:30) (17 - 34)  SpO2: 97% (25 Apr 2019 12:30) (96% - 100%)        CAPILLARY BLOOD GLUCOSE          PHYSICAL EXAM:        HOSPITAL MEDICATIONS:  MEDICATIONS  (STANDING):    MEDICATIONS  (PRN):      LABS:                        12.9   10.00 )-----------( 284      ( 25 Apr 2019 11:40 )             42.5     Hgb Trend: 12.9<--  04-25    141  |  106  |  13  ----------------------------<  195<H>  4.6   |  31  |  1.10    Ca    9.5      25 Apr 2019 11:40      Creatinine Trend: 1.10<--, 1.10<--, 1.30<--, 1.10<--, 1.10<--  PT/INR - ( 25 Apr 2019 11:40 )   PT: 10.8 sec;   INR: 0.96 ratio         PTT - ( 25 Apr 2019 11:40 )  PTT:36.3 sec    Arterial Blood Gas:  04-25 @ 12:16  7.23/74/76/25/92/3.3  ABG lactate: --        MICROBIOLOGY:     RADIOLOGY:  [ ] Reviewed and interpreted by me    EKG:        [facility] ANP-BC (ext. [id number]) CHIEF COMPLAINT:    HPI:  78 yr old male with PMHx HTN, HLD, CAD, CABG '04, PVD s/p stents, DM2 on metformin/glypizide, COPD on home O2 and Bipap qhs, Asthma, frequent admissions for hypoxic/hypercapnic resp failure 2/2 to COPD exacerbation where one episode was c/b bradycardia/PEA arrest (10/2018). Most recent hospitalization 4/4 - 7/19 treated with antibx/steroid therapy and bipap therapy. Pt now presents to E.D. via EMS from home after developing progressive sob beginning yesterday  (4/24/19) evening with initial relief from albuterol nebulizer therapy with eventual return of symptoms in spite of utilizing bipap therapy and 3 - 4 nebulizer tx throughout night and early morning. In E.D. found to have hypercapnic resp failure with ph 7.23 PCO2 74. Received 3 tx of albuterol nebulizers, solumedrol 125mg IVP, started on azithromycin IV, placed on Bipap therapy.       Pt denies F/C/N/V/D or sick contacts, denies cough or URI symptoms or LE swelling, endorses sitting in back yard previous few days      Consult called for hypercapnic resp failure secondary to COPD exacerbation          PAST MEDICAL & SURGICAL HISTORY:  PVD (peripheral vascular disease)  Hypertension  COPD (chronic obstructive pulmonary disease)  Osteomyelitis  Dyslipidemia  CAD (Coronary Artery Disease)  Diabetes Mellitus, Type II  Compound fracture: left leg  CABG (Coronary Artery Bypass Graft)      FAMILY HISTORY:  Family history of diabetes mellitus (Sibling)      SOCIAL HISTORY:  Smoking: [ ] Never Smoked [xx ] Former Smoker (_1_ packs x _30__ years) [ ] Current Smoker  (__ packs x ___ years)  Substance Use: [xx ] Never Used [ ] Used ____  EtOH Use: social   Marital Status: [ ] Single [xx ]  [ ]  [ ]   Sexual History:   Occupation: retired   Recent Travel: none  Country of Birth:  Advance Directives:    Allergies    No Known Allergies    Intolerances    shellfish (Nausea)      HOME MEDICATIONS:    REVIEW OF SYSTEMS:      CONSTITUTIONAL: No weakness, fevers or chills  EYES/ENT: No visual changes;  No vertigo or throat pain   NECK: No pain or stiffness  RESPIRATORY: No cough, wheezing, hemoptysis; + shortness of breath  CARDIOVASCULAR: No chest pain or palpitations  GASTROINTESTINAL: No abdominal or epigastric pain. No nausea, vomiting, or hematemesis; No diarrhea or constipation. No melena or hematochezia.  GENITOURINARY: No dysuria, frequency or hematuria  NEUROLOGICAL: No numbness or weakness  SKIN: No itching, rashes      OBJECTIVE:  ICU Vital Signs Last 24 Hrs  T(C): 36.7 (25 Apr 2019 11:01), Max: 36.7 (25 Apr 2019 11:01)  T(F): 98.1 (25 Apr 2019 11:01), Max: 98.1 (25 Apr 2019 11:01)  HR: 123 (25 Apr 2019 12:30) (118 - 138)  BP: 161/66 (25 Apr 2019 12:30) (132/73 - 161/66)  BP(mean): --  ABP: --  ABP(mean): --  RR: 31 (25 Apr 2019 12:30) (17 - 34)  SpO2: 97% (25 Apr 2019 12:30) (96% - 100%)    CURRENT VITAL SIGNS:  BP: 130/70;  (sinus tach); RR: 31 Bipap 12/6 with SpVt of 365 - 500 cc's ; Temp: 36.7 P.O.    CAPILLARY BLOOD GLUCOSE          PHYSICAL EXAM:  GENERAL: NAD, well-groomed, well-developed  HEAD:  Atraumatic, Normocephalic  EYES: EOMI, PERRLA, conjunctiva and sclera clear  ENMT: No oropharyngeal exudates, erythema or lesions,  Moist mucous membranes  NECK: Supple, no cervical lymphadenopathy, no JVD  NERVOUS SYSTEM:  Alert & Oriented X3, CN II-XII intact, 5/5 BUE and BLE motor strength, full sensation to light touch   CHEST/LUNG: Breath sounds bilaterally with diffuse expiratory wheezes, slight diminished breath sounds in LLL. POCUS with A line predominance with few focal B lines  HEART: Sinus tach without ectopy,  S1/S2 with I/VI murmurs, without rubs, or gallops. POCUS with good LVFx, VTI 17.8 IVC collapsable   ABDOMEN: Soft, Nontender, Nondistended; Bowel sounds present, Bladder non distended, non palpable  EXTREMITIES: Pulses palpable radial pulses 2+ bilat, DP/PT 1+/1+ bilat, without clubbing,cyanosis. Digits warm to touch with good cap refill < 3 secs  Skin: warm, dry, intact, normal color, no rash or abnormal lesions,without palpable nodes      HOSPITAL MEDICATIONS:  MEDICATIONS  (STANDING):    MEDICATIONS  (PRN):      LABS:                        12.9   10.00 )-----------( 284      ( 25 Apr 2019 11:40 )             42.5     Hgb Trend: 12.9<--  04-25    141  |  106  |  13  ----------------------------<  195<H>  4.6   |  31  |  1.10    Ca    9.5      25 Apr 2019 11:40      Creatinine Trend: 1.10<--, 1.10<--, 1.30<--, 1.10<--, 1.10<--  PT/INR - ( 25 Apr 2019 11:40 )   PT: 10.8 sec;   INR: 0.96 ratio         PTT - ( 25 Apr 2019 11:40 )  PTT:36.3 sec    Arterial Blood Gas:  04-25 @ 12:16  7.23/74/76/25/92/3.3  ABG lactate: --        MICROBIOLOGY:     RADIOLOGY:  [ ] Reviewed and interpreted by me    EKG:        [facility] (ext. [id number]) CHIEF COMPLAINT:    HPI:  78 yr old male with PMHx HTN, HLD, CAD, CABG '04, PVD s/p stents, DM2 on metformin/glypizide, COPD on home O2 and Bipap qhs, Asthma, frequent admissions for hypoxic/hypercapnic resp failure 2/2 to COPD exacerbation where one episode was c/b bradycardia/PEA arrest (10/2018). Most recent hospitalization 4/4 - 7/19 treated with antibx/steroid therapy and bipap therapy. Pt now presents to E.D. via EMS from home after developing progressive sob beginning yesterday  (4/24/19) evening with initial relief from albuterol nebulizer therapy with eventual return of symptoms in spite of utilizing bipap therapy and 3 - 4 nebulizer tx throughout night and early morning. In E.D. found to have hypercapnic resp failure with ph 7.23 PCO2 74. Received 3 tx of albuterol nebulizers, solumedrol 125mg IVP, started on azithromycin IV, placed on Bipap therapy.       Pt denies F/C/N/V/D or sick contacts, denies cough or URI symptoms or LE swelling, endorses sitting in back yard previous few days      Consult called for hypercapnic resp failure secondary to COPD exacerbation Vs Asthma exacerbation          PAST MEDICAL & SURGICAL HISTORY:  PVD (peripheral vascular disease)  Hypertension  COPD (chronic obstructive pulmonary disease)  Osteomyelitis  Dyslipidemia  CAD (Coronary Artery Disease)  Diabetes Mellitus, Type II  Compound fracture: left leg  CABG (Coronary Artery Bypass Graft)      FAMILY HISTORY:  Family history of diabetes mellitus (Sibling)      SOCIAL HISTORY:  Smoking: [ ] Never Smoked [xx ] Former Smoker (_1_ packs x _30__ years) [ ] Current Smoker  (__ packs x ___ years)  Substance Use: [xx ] Never Used [ ] Used ____  EtOH Use: social   Marital Status: [ ] Single [xx ]  [ ]  [ ]   Sexual History:   Occupation: retired   Recent Travel: none  Country of Birth:  Advance Directives:    Allergies    No Known Allergies    Intolerances    shellfish (Nausea)      HOME MEDICATIONS:    REVIEW OF SYSTEMS:      CONSTITUTIONAL: No weakness, fevers or chills  EYES/ENT: No visual changes;  No vertigo or throat pain   NECK: No pain or stiffness  RESPIRATORY: No cough, wheezing, hemoptysis; + shortness of breath  CARDIOVASCULAR: No chest pain or palpitations  GASTROINTESTINAL: No abdominal or epigastric pain. No nausea, vomiting, or hematemesis; No diarrhea or constipation. No melena or hematochezia.  GENITOURINARY: No dysuria, frequency or hematuria  NEUROLOGICAL: No numbness or weakness  SKIN: No itching, rashes      OBJECTIVE:  ICU Vital Signs Last 24 Hrs  T(C): 36.7 (25 Apr 2019 11:01), Max: 36.7 (25 Apr 2019 11:01)  T(F): 98.1 (25 Apr 2019 11:01), Max: 98.1 (25 Apr 2019 11:01)  HR: 123 (25 Apr 2019 12:30) (118 - 138)  BP: 161/66 (25 Apr 2019 12:30) (132/73 - 161/66)  BP(mean): --  ABP: --  ABP(mean): --  RR: 31 (25 Apr 2019 12:30) (17 - 34)  SpO2: 97% (25 Apr 2019 12:30) (96% - 100%)    CURRENT VITAL SIGNS:  BP: 130/70;  (sinus tach); RR: 31 Bipap 12/6 with SpVt of 365 - 500 cc's ; Temp: 36.7 P.O.    CAPILLARY BLOOD GLUCOSE          PHYSICAL EXAM:  GENERAL: NAD, well-groomed, well-developed  HEAD:  Atraumatic, Normocephalic  EYES: EOMI, PERRLA, conjunctiva and sclera clear  ENMT: No oropharyngeal exudates, erythema or lesions,  Moist mucous membranes  NECK: Supple, no cervical lymphadenopathy, no JVD  NERVOUS SYSTEM:  Alert & Oriented X3, CN II-XII intact, 5/5 BUE and BLE motor strength, full sensation to light touch   CHEST/LUNG: Breath sounds bilaterally with diffuse expiratory wheezes, slight diminished breath sounds in LLL. POCUS with A line predominance with few focal B lines  HEART: Sinus tach without ectopy,  S1/S2 with I/VI murmurs, without rubs, or gallops. POCUS with good LVFx, VTI 17.8 IVC collapsable   ABDOMEN: Soft, Nontender, Nondistended; Bowel sounds present, Bladder non distended, non palpable  EXTREMITIES: Pulses palpable radial pulses 2+ bilat, DP/PT 1+/1+ bilat, without clubbing,cyanosis. Digits warm to touch with good cap refill < 3 secs  Skin: warm, dry, intact, normal color, no rash or abnormal lesions,without palpable nodes      HOSPITAL MEDICATIONS:  MEDICATIONS  (STANDING):    MEDICATIONS  (PRN):      LABS:                        12.9   10.00 )-----------( 284      ( 25 Apr 2019 11:40 )             42.5     Hgb Trend: 12.9<--  04-25    141  |  106  |  13  ----------------------------<  195<H>  4.6   |  31  |  1.10    Ca    9.5      25 Apr 2019 11:40      Creatinine Trend: 1.10<--, 1.10<--, 1.30<--, 1.10<--, 1.10<--  PT/INR - ( 25 Apr 2019 11:40 )   PT: 10.8 sec;   INR: 0.96 ratio         PTT - ( 25 Apr 2019 11:40 )  PTT:36.3 sec    Arterial Blood Gas:  04-25 @ 12:16  7.23/74/76/25/92/3.3  ABG lactate: --        MICROBIOLOGY:     RADIOLOGY:  [ ] Reviewed and interpreted by me    EKG:        [facility] ANP-BC (ext. [id number]) CHIEF COMPLAINT:    HPI:  78 yr old male with PMHx HTN, HLD, CAD, CABG '04, HFpEF of 55% (3/14/19) PVD s/p stents (remote), DM2 on metformin/glypizide, COPD on home O2 and Bipap qhs, Asthma, frequent admissions for hypoxic/hypercapnic resp failure 2/2 to COPD exacerbation where one episode was c/b bradycardia/PEA arrest (10/2018). Most recent hospitalization 4/4 - 7/19 treated with antibx/steroid therapy and bipap therapy. Pt now presents to E.D. via EMS from home after developing progressive sob beginning yesterday  (4/24/19) evening with initial relief from albuterol nebulizer therapy with eventual return of symptoms in spite of utilizing bipap therapy and 3 - 4 nebulizer tx throughout night and early morning. In E.D. found to have hypercapnic resp failure with ph 7.23 PCO2 74. Received 3 tx of albuterol nebulizers, solumedrol 125mg IVP, started on azithromycin IV, placed on Bipap therapy.       Pt denies F/C/N/V/D or sick contacts, denies cough or URI symptoms or LE swelling, endorses sitting in back yard previous few days      Consult called for hypercapnic resp failure secondary to COPD exacerbation Vs Asthma exacerbation          PAST MEDICAL & SURGICAL HISTORY:  PVD (peripheral vascular disease)  Hypertension  COPD (chronic obstructive pulmonary disease)  Osteomyelitis  Dyslipidemia  CAD (Coronary Artery Disease)  Diabetes Mellitus, Type II  Compound fracture: left leg  CABG (Coronary Artery Bypass Graft)      FAMILY HISTORY:  Family history of diabetes mellitus (Sibling)      SOCIAL HISTORY:  Smoking: [ ] Never Smoked [xx ] Former Smoker (_1_ packs x _30__ years) [ ] Current Smoker  (__ packs x ___ years)  Substance Use: [xx ] Never Used [ ] Used ____  EtOH Use: social   Marital Status: [ ] Single [xx ]  [ ]  [ ]   Sexual History:   Occupation: retired   Recent Travel: none  Country of Birth:  Advance Directives:    Allergies    No Known Allergies    Intolerances    shellfish (Nausea)      HOME MEDICATIONS:    REVIEW OF SYSTEMS:      CONSTITUTIONAL: No weakness, fevers or chills  EYES/ENT: No visual changes;  No vertigo or throat pain   NECK: No pain or stiffness  RESPIRATORY: No cough, wheezing, hemoptysis; + shortness of breath  CARDIOVASCULAR: No chest pain or palpitations  GASTROINTESTINAL: No abdominal or epigastric pain. No nausea, vomiting, or hematemesis; No diarrhea or constipation. No melena or hematochezia.  GENITOURINARY: No dysuria, frequency or hematuria  NEUROLOGICAL: No numbness or weakness  SKIN: No itching, rashes      OBJECTIVE:  ICU Vital Signs Last 24 Hrs  T(C): 36.7 (25 Apr 2019 11:01), Max: 36.7 (25 Apr 2019 11:01)  T(F): 98.1 (25 Apr 2019 11:01), Max: 98.1 (25 Apr 2019 11:01)  HR: 123 (25 Apr 2019 12:30) (118 - 138)  BP: 161/66 (25 Apr 2019 12:30) (132/73 - 161/66)  BP(mean): --  ABP: --  ABP(mean): --  RR: 31 (25 Apr 2019 12:30) (17 - 34)  SpO2: 97% (25 Apr 2019 12:30) (96% - 100%)    CURRENT VITAL SIGNS:  BP: 130/70;  (sinus tach); RR: 31 Bipap 12/6 with SpVt of 365 - 500 cc's ; Temp: 36.7 P.O.    CAPILLARY BLOOD GLUCOSE          PHYSICAL EXAM:  GENERAL: NAD, well-groomed, well-developed  HEAD:  Atraumatic, Normocephalic  EYES: EOMI, PERRLA, conjunctiva and sclera clear  ENMT: No oropharyngeal exudates, erythema or lesions,  Moist mucous membranes  NECK: Supple, no cervical lymphadenopathy, no JVD  NERVOUS SYSTEM:  Alert & Oriented X3, CN II-XII intact, 5/5 BUE and BLE motor strength, full sensation to light touch   CHEST/LUNG: Breath sounds bilaterally with diffuse expiratory wheezes, slight diminished breath sounds in LLL. POCUS with A line predominance with few focal B lines  HEART: Sinus tach without ectopy,  S1/S2 with I/VI murmurs, without rubs, or gallops. POCUS with good LVFx, VTI 17.8 IVC collapsable   ABDOMEN: Soft, Nontender, Nondistended; Bowel sounds present, Bladder non distended, non palpable  EXTREMITIES: Pulses palpable radial pulses 2+ bilat, DP/PT 1+/1+ bilat, without clubbing,cyanosis. Digits warm to touch with good cap refill < 3 secs  Skin: warm, dry, intact, normal color, no rash or abnormal lesions,without palpable nodes      HOSPITAL MEDICATIONS:  MEDICATIONS  (STANDING):    MEDICATIONS  (PRN):      LABS:                        12.9   10.00 )-----------( 284      ( 25 Apr 2019 11:40 )             42.5     Hgb Trend: 12.9<--  04-25    141  |  106  |  13  ----------------------------<  195<H>  4.6   |  31  |  1.10    Ca    9.5      25 Apr 2019 11:40      Creatinine Trend: 1.10<--, 1.10<--, 1.30<--, 1.10<--, 1.10<--  PT/INR - ( 25 Apr 2019 11:40 )   PT: 10.8 sec;   INR: 0.96 ratio         PTT - ( 25 Apr 2019 11:40 )  PTT:36.3 sec    Arterial Blood Gas:  04-25 @ 12:16  7.23/74/76/25/92/3.3  ABG lactate: --        MICROBIOLOGY:     RADIOLOGY:  [ ] Reviewed and interpreted by me    EKG:        [facility] ANP-BC (ext. [id number])

## 2019-04-26 LAB
ALBUMIN SERPL ELPH-MCNC: 3.3 G/DL — SIGNIFICANT CHANGE UP (ref 3.3–5)
ALP SERPL-CCNC: 78 U/L — SIGNIFICANT CHANGE UP (ref 40–120)
ALT FLD-CCNC: 23 U/L — SIGNIFICANT CHANGE UP (ref 12–78)
ANION GAP SERPL CALC-SCNC: 5 MMOL/L — SIGNIFICANT CHANGE UP (ref 5–17)
APTT BLD: 36.3 SEC — SIGNIFICANT CHANGE UP (ref 28.5–37)
AST SERPL-CCNC: 10 U/L — LOW (ref 15–37)
BASOPHILS # BLD AUTO: 0.01 K/UL — SIGNIFICANT CHANGE UP (ref 0–0.2)
BASOPHILS NFR BLD AUTO: 0.1 % — SIGNIFICANT CHANGE UP (ref 0–2)
BILIRUB SERPL-MCNC: 0.4 MG/DL — SIGNIFICANT CHANGE UP (ref 0.2–1.2)
BUN SERPL-MCNC: 13 MG/DL — SIGNIFICANT CHANGE UP (ref 7–23)
CALCIUM SERPL-MCNC: 9.5 MG/DL — SIGNIFICANT CHANGE UP (ref 8.5–10.1)
CHLORIDE SERPL-SCNC: 103 MMOL/L — SIGNIFICANT CHANGE UP (ref 96–108)
CK MB BLD-MCNC: 5.5 % — HIGH (ref 0–3.5)
CK MB CFR SERPL CALC: 3.3 NG/ML — SIGNIFICANT CHANGE UP (ref 0–3.6)
CK SERPL-CCNC: 60 U/L — SIGNIFICANT CHANGE UP (ref 26–308)
CO2 SERPL-SCNC: 33 MMOL/L — HIGH (ref 22–31)
CREAT SERPL-MCNC: 1.1 MG/DL — SIGNIFICANT CHANGE UP (ref 0.5–1.3)
EOSINOPHIL # BLD AUTO: 0 K/UL — SIGNIFICANT CHANGE UP (ref 0–0.5)
EOSINOPHIL NFR BLD AUTO: 0 % — SIGNIFICANT CHANGE UP (ref 0–6)
GLUCOSE SERPL-MCNC: 195 MG/DL — HIGH (ref 70–99)
HBA1C BLD-MCNC: 6.9 % — HIGH (ref 4–5.6)
HCT VFR BLD CALC: 39.3 % — SIGNIFICANT CHANGE UP (ref 39–50)
HGB BLD-MCNC: 12.2 G/DL — LOW (ref 13–17)
IMM GRANULOCYTES NFR BLD AUTO: 0.4 % — SIGNIFICANT CHANGE UP (ref 0–1.5)
INR BLD: 0.99 RATIO — SIGNIFICANT CHANGE UP (ref 0.88–1.16)
LYMPHOCYTES # BLD AUTO: 0.72 K/UL — LOW (ref 1–3.3)
LYMPHOCYTES # BLD AUTO: 9.5 % — LOW (ref 13–44)
MAGNESIUM SERPL-MCNC: 1.9 MG/DL — SIGNIFICANT CHANGE UP (ref 1.6–2.6)
MCHC RBC-ENTMCNC: 28.3 PG — SIGNIFICANT CHANGE UP (ref 27–34)
MCHC RBC-ENTMCNC: 31 GM/DL — LOW (ref 32–36)
MCV RBC AUTO: 91.2 FL — SIGNIFICANT CHANGE UP (ref 80–100)
MONOCYTES # BLD AUTO: 0.23 K/UL — SIGNIFICANT CHANGE UP (ref 0–0.9)
MONOCYTES NFR BLD AUTO: 3 % — SIGNIFICANT CHANGE UP (ref 2–14)
NEUTROPHILS # BLD AUTO: 6.58 K/UL — SIGNIFICANT CHANGE UP (ref 1.8–7.4)
NEUTROPHILS NFR BLD AUTO: 87 % — HIGH (ref 43–77)
NRBC # BLD: 0 /100 WBCS — SIGNIFICANT CHANGE UP (ref 0–0)
PHOSPHATE SERPL-MCNC: 2.4 MG/DL — LOW (ref 2.5–4.5)
PLATELET # BLD AUTO: 263 K/UL — SIGNIFICANT CHANGE UP (ref 150–400)
POTASSIUM SERPL-MCNC: 5 MMOL/L — SIGNIFICANT CHANGE UP (ref 3.5–5.3)
POTASSIUM SERPL-SCNC: 5 MMOL/L — SIGNIFICANT CHANGE UP (ref 3.5–5.3)
PROT SERPL-MCNC: 6.2 G/DL — SIGNIFICANT CHANGE UP (ref 6–8.3)
PROTHROM AB SERPL-ACNC: 11.3 SEC — SIGNIFICANT CHANGE UP (ref 10–12.9)
RAPID RVP RESULT: SIGNIFICANT CHANGE UP
RBC # BLD: 4.31 M/UL — SIGNIFICANT CHANGE UP (ref 4.2–5.8)
RBC # FLD: 12.7 % — SIGNIFICANT CHANGE UP (ref 10.3–14.5)
SODIUM SERPL-SCNC: 141 MMOL/L — SIGNIFICANT CHANGE UP (ref 135–145)
TROPONIN I SERPL-MCNC: <.015 NG/ML — SIGNIFICANT CHANGE UP (ref 0.01–0.04)
WBC # BLD: 7.57 K/UL — SIGNIFICANT CHANGE UP (ref 3.8–10.5)
WBC # FLD AUTO: 7.57 K/UL — SIGNIFICANT CHANGE UP (ref 3.8–10.5)

## 2019-04-26 PROCEDURE — 99232 SBSQ HOSP IP/OBS MODERATE 35: CPT

## 2019-04-26 PROCEDURE — 99233 SBSQ HOSP IP/OBS HIGH 50: CPT | Mod: GC

## 2019-04-26 RX ORDER — ENOXAPARIN SODIUM 100 MG/ML
40 INJECTION SUBCUTANEOUS EVERY 24 HOURS
Qty: 0 | Refills: 0 | Status: DISCONTINUED | OUTPATIENT
Start: 2019-04-26 | End: 2019-04-28

## 2019-04-26 RX ORDER — ROFLUMILAST 500 UG/1
1 TABLET ORAL
Qty: 0 | Refills: 0 | COMMUNITY

## 2019-04-26 RX ORDER — INSULIN LISPRO 100/ML
VIAL (ML) SUBCUTANEOUS
Qty: 0 | Refills: 0 | Status: DISCONTINUED | OUTPATIENT
Start: 2019-04-26 | End: 2019-04-28

## 2019-04-26 RX ORDER — AZITHROMYCIN 500 MG/1
500 TABLET, FILM COATED ORAL EVERY 24 HOURS
Qty: 0 | Refills: 0 | Status: DISCONTINUED | OUTPATIENT
Start: 2019-04-27 | End: 2019-04-28

## 2019-04-26 RX ADMIN — Medication 6: at 17:07

## 2019-04-26 RX ADMIN — Medication 40 MILLIGRAM(S): at 20:28

## 2019-04-26 RX ADMIN — ATORVASTATIN CALCIUM 40 MILLIGRAM(S): 80 TABLET, FILM COATED ORAL at 22:20

## 2019-04-26 RX ADMIN — CLOPIDOGREL BISULFATE 75 MILLIGRAM(S): 75 TABLET, FILM COATED ORAL at 12:20

## 2019-04-26 RX ADMIN — Medication 6: at 12:25

## 2019-04-26 RX ADMIN — Medication 3 MILLILITER(S): at 08:49

## 2019-04-26 RX ADMIN — Medication 12.5 MILLIGRAM(S): at 06:08

## 2019-04-26 RX ADMIN — HEPARIN SODIUM 5000 UNIT(S): 5000 INJECTION INTRAVENOUS; SUBCUTANEOUS at 13:13

## 2019-04-26 RX ADMIN — Medication 12.5 MILLIGRAM(S): at 17:07

## 2019-04-26 RX ADMIN — Medication 3 MILLILITER(S): at 20:03

## 2019-04-26 RX ADMIN — Medication 4: at 21:23

## 2019-04-26 RX ADMIN — TAMSULOSIN HYDROCHLORIDE 0.4 MILLIGRAM(S): 0.4 CAPSULE ORAL at 21:23

## 2019-04-26 RX ADMIN — Medication 2: at 02:01

## 2019-04-26 RX ADMIN — VALSARTAN 80 MILLIGRAM(S): 80 TABLET ORAL at 06:11

## 2019-04-26 RX ADMIN — Medication 60 MILLIGRAM(S): at 06:08

## 2019-04-26 RX ADMIN — Medication 3 MILLILITER(S): at 02:09

## 2019-04-26 RX ADMIN — Medication 60 MILLIGRAM(S): at 00:28

## 2019-04-26 RX ADMIN — AZITHROMYCIN 255 MILLIGRAM(S): 500 TABLET, FILM COATED ORAL at 12:26

## 2019-04-26 RX ADMIN — HEPARIN SODIUM 5000 UNIT(S): 5000 INJECTION INTRAVENOUS; SUBCUTANEOUS at 06:08

## 2019-04-26 RX ADMIN — Medication 3 MILLILITER(S): at 14:24

## 2019-04-26 RX ADMIN — Medication 2: at 06:11

## 2019-04-26 RX ADMIN — Medication 60 MILLIGRAM(S): at 12:20

## 2019-04-26 NOTE — PROGRESS NOTE ADULT - SUBJECTIVE AND OBJECTIVE BOX
Date/Time Patient Seen:  		  Referring MD:   Data Reviewed	       Patient is a 78y old  Male who presents with a chief complaint of dyspnea (26 Apr 2019 00:17)      Subjective/HPI     PAST MEDICAL & SURGICAL HISTORY:  PVD (peripheral vascular disease)  Hypertension  Diabetes mellitus  COPD (chronic obstructive pulmonary disease)  Osteomyelitis  Asymptomatic Peripheral Vascular Disease  Dyslipidemia  CAD (Coronary Artery Disease)  Diabetes Mellitus, Type II  Compound fracture: left leg  CABG (Coronary Artery Bypass Graft)        Medication list         MEDICATIONS  (STANDING):  ALBUTerol/ipratropium for Nebulization 3 milliLiter(s) Nebulizer every 6 hours  atorvastatin 40 milliGRAM(s) Oral at bedtime  azithromycin  IVPB 500 milliGRAM(s) IV Intermittent every 24 hours  azithromycin  IVPB      buDESOnide 160 MICROgram(s)/formoterol 4.5 MICROgram(s) Inhaler 2 Puff(s) Inhalation two times a day  clopidogrel Tablet 75 milliGRAM(s) Oral daily  heparin  Injectable 5000 Unit(s) SubCutaneous every 8 hours  insulin lispro (HumaLOG) corrective regimen sliding scale   SubCutaneous every 4 hours  methylPREDNISolone sodium succinate Injectable 60 milliGRAM(s) IV Push every 6 hours  metoprolol tartrate 12.5 milliGRAM(s) Oral two times a day  tamsulosin 0.4 milliGRAM(s) Oral at bedtime  valsartan 80 milliGRAM(s) Oral daily    MEDICATIONS  (PRN):  ALBUTerol    0.083% 2.5 milliGRAM(s) Nebulizer every 2 hours PRN Shortness of Breath and/or Wheezing         Vitals log        ICU Vital Signs Last 24 Hrs  T(C): 36.7 (26 Apr 2019 04:05), Max: 36.8 (25 Apr 2019 16:35)  T(F): 98 (26 Apr 2019 04:05), Max: 98.3 (25 Apr 2019 16:35)  HR: 74 (26 Apr 2019 06:00) (57 - 138)  BP: 108/68 (26 Apr 2019 06:00) (108/68 - 174/78)  BP(mean): 83 (26 Apr 2019 06:00) (79 - 112)  ABP: --  ABP(mean): --  RR: 22 (26 Apr 2019 06:00) (12 - 35)  SpO2: 98% (26 Apr 2019 06:00) (96% - 100%)           Input and Output:  I&O's Detail    25 Apr 2019 07:01  -  26 Apr 2019 07:00  --------------------------------------------------------  IN:    Oral Fluid: 460 mL  Total IN: 460 mL    OUT:    Voided: 1150 mL  Total OUT: 1150 mL    Total NET: -690 mL          Lab Data                        12.2   7.57  )-----------( 263      ( 26 Apr 2019 05:27 )             39.3     04-26    141  |  103  |  13  ----------------------------<  195<H>  5.0   |  33<H>  |  1.10    Ca    9.5      26 Apr 2019 05:27  Phos  2.4     04-26  Mg     1.9     04-26    TPro  6.2  /  Alb  3.3  /  TBili  0.4  /  DBili  x   /  AST  10<L>  /  ALT  23  /  AlkPhos  78  04-26    ABG - ( 25 Apr 2019 17:44 )  pH, Arterial: 7.31  pH, Blood: x     /  pCO2: 59    /  pO2: 95    / HCO3: 26    / Base Excess: 3.2   /  SaO2: 97                CARDIAC MARKERS ( 26 Apr 2019 05:27 )  <.015 ng/mL / x     / 60 U/L / x     / 3.3 ng/mL  CARDIAC MARKERS ( 25 Apr 2019 16:22 )  <.015 ng/mL / x     / 69 U/L / x     / 2.9 ng/mL  CARDIAC MARKERS ( 25 Apr 2019 11:40 )  <.015 ng/mL / x     / x     / x     / 2.7 ng/mL        Review of Systems	      Objective     Physical Examination    heart s1s2  lung dec BS  on BIPAP      Pertinent Lab findings & Imaging      Eliecer:  NO   Adequate UO     I&O's Detail    25 Apr 2019 07:01  -  26 Apr 2019 07:00  --------------------------------------------------------  IN:    Oral Fluid: 460 mL  Total IN: 460 mL    OUT:    Voided: 1150 mL  Total OUT: 1150 mL    Total NET: -690 mL               Discussed with:     Cultures:	        Radiology

## 2019-04-26 NOTE — PROGRESS NOTE ADULT - PROBLEM SELECTOR PLAN 1
resp distress  on BIPAP  alert  verbal  on copd regimen  on ABX regimen  I and O  monitor vs and HD and Sat  assess off BIPAP this am   pt is full code  GOC discussed  will follow  cont ICU management  labs reviewed  cx pending

## 2019-04-26 NOTE — PROGRESS NOTE ADULT - SUBJECTIVE AND OBJECTIVE BOX
[facility] - Saud Aguilar, Dani, Génesis, Medina, Kumar Hodges  Office Number:  [phone]    Patient resting comfortably in bed in NAD.  Laying flat with no respiratory distress.  No complaints of chest pain, dyspnea, palpitations, PND, or orthopnea.  on bipap and feeling much better    ROS: negative unless otherwise mentioned.    Telemetry:  sr/st    MEDICATIONS  (STANDING):  ALBUTerol/ipratropium for Nebulization 3 milliLiter(s) Nebulizer every 6 hours  atorvastatin 40 milliGRAM(s) Oral at bedtime  azithromycin  IVPB 500 milliGRAM(s) IV Intermittent every 24 hours  azithromycin  IVPB      buDESOnide 160 MICROgram(s)/formoterol 4.5 MICROgram(s) Inhaler 2 Puff(s) Inhalation two times a day  clopidogrel Tablet 75 milliGRAM(s) Oral daily  heparin  Injectable 5000 Unit(s) SubCutaneous every 8 hours  insulin lispro (HumaLOG) corrective regimen sliding scale   SubCutaneous every 4 hours  methylPREDNISolone sodium succinate Injectable 60 milliGRAM(s) IV Push every 6 hours  metoprolol tartrate 12.5 milliGRAM(s) Oral two times a day  tamsulosin 0.4 milliGRAM(s) Oral at bedtime  valsartan 80 milliGRAM(s) Oral daily    MEDICATIONS  (PRN):  ALBUTerol    0.083% 2.5 milliGRAM(s) Nebulizer every 2 hours PRN Shortness of Breath and/or Wheezing      Allergies    No Known Allergies    Intolerances    shellfish (Nausea)      Vital Signs Last 24 Hrs  T(C): 36.7 (26 Apr 2019 04:05), Max: 36.8 (25 Apr 2019 16:35)  T(F): 98 (26 Apr 2019 04:05), Max: 98.3 (25 Apr 2019 16:35)  HR: 74 (26 Apr 2019 06:00) (57 - 138)  BP: 108/68 (26 Apr 2019 06:00) (108/68 - 174/78)  BP(mean): 83 (26 Apr 2019 06:00) (79 - 112)  RR: 22 (26 Apr 2019 06:00) (12 - 35)  SpO2: 98% (26 Apr 2019 06:00) (96% - 100%)    I&O's Summary    25 Apr 2019 07:01 - 26 Apr 2019 07:00  --------------------------------------------------------  IN: 460 mL / OUT: 1150 mL / NET: -690 mL        ON EXAM:    Constitutional: NAD, alert and oriented x 3  Lungs:  Non-labored, breath sounds with Bilaterally diminished at bases  No  wheezes, crackles or rhonchi   Cardiovascular: RRR.  S1 and S2 positive.  No murmurs, rubs, gallops or clicks  Gastrointestinal: Bowel Sounds present, soft, nontender.   Lymph: No peripheral edema. No cervical lymphadenopathy.  Neurological: Alert, no focal deficits  Skin: No rashes or ulcers   Psych:  Mood & affect appropriate.      LABS: All Labs Reviewed:                        12.2   7.57  )-----------( 263      ( 26 Apr 2019 05:27 )             39.3                         12.1   9.42  )-----------( 264      ( 25 Apr 2019 16:22 )             39.3                         12.9   10.00 )-----------( 284      ( 25 Apr 2019 11:40 )             42.5     26 Apr 2019 05:27    141    |  103    |  13     ----------------------------<  195    5.0     |  33     |  1.10   25 Apr 2019 16:22    140    |  105    |  12     ----------------------------<  236    4.9     |  32     |  1.10   25 Apr 2019 11:40    141    |  106    |  13     ----------------------------<  195    4.6     |  31     |  1.10     Ca    9.5        26 Apr 2019 05:27  Ca    9.0        25 Apr 2019 16:22  Ca    9.5        25 Apr 2019 11:40  Phos  2.4       26 Apr 2019 05:27  Phos  3.0       25 Apr 2019 16:22  Mg     1.9       26 Apr 2019 05:27  Mg     2.0       25 Apr 2019 16:22    TPro  6.2    /  Alb  3.3    /  TBili  0.4    /  DBili  x      /  AST  10     /  ALT  23     /  AlkPhos  78     26 Apr 2019 05:27  TPro  6.6    /  Alb  3.5    /  TBili  0.4    /  DBili  x      /  AST  13     /  ALT  23     /  AlkPhos  90     25 Apr 2019 16:22    PT/INR - ( 26 Apr 2019 05:27 )   PT: 11.3 sec;   INR: 0.99 ratio         PTT - ( 26 Apr 2019 05:27 )  PTT:36.3 sec  CARDIAC MARKERS ( 26 Apr 2019 05:27 )  <.015 ng/mL / x     / 60 U/L / x     / 3.3 ng/mL  CARDIAC MARKERS ( 25 Apr 2019 16:22 )  <.015 ng/mL / x     / 69 U/L / x     / 2.9 ng/mL  CARDIAC MARKERS ( 25 Apr 2019 11:40 )  <.015 ng/mL / x     / x     / x     / 2.7 ng/mL      Blood Culture:   04-25 @ 11:40  Pro Bnp 102

## 2019-04-26 NOTE — PROGRESS NOTE ADULT - SUBJECTIVE AND OBJECTIVE BOX
24 hour events: on BiPAP overnight. Pt seen and examined at bedside. States he feels "100%." Denies CP, SOB, wheezing, chest tightness, lightheadedness.     Review of Systems:  Constitutional: No fever, chills  Neuro: No headache, lightheadedness, weakness  Resp: No cough, wheezing, shortness of breath  CVS: No chest pain, palpitations, leg swelling  GI: No abdominal pain, nausea, vomiting, + normal BM  : No dysuria, frequency, incontinence      ICU Vital Signs Last 24 Hrs  T(C): 36.1 (26 Apr 2019 07:39), Max: 36.8 (25 Apr 2019 16:35)  T(F): 97 (26 Apr 2019 07:39), Max: 98.3 (25 Apr 2019 16:35)  HR: 75 (26 Apr 2019 09:05) (57 - 138)  BP: 108/57 (26 Apr 2019 09:00) (108/57 - 174/78)  BP(mean): 77 (26 Apr 2019 09:00) (77 - 112)  ABP: --  ABP(mean): --  RR: 23 (26 Apr 2019 09:00) (12 - 35)  SpO2: 100% (26 Apr 2019 09:05) (96% - 100%)          CAPILLARY BLOOD GLUCOSE      POCT Blood Glucose.: 183 mg/dL (26 Apr 2019 06:07)      I&O's Summary    25 Apr 2019 07:01  -  26 Apr 2019 07:00  --------------------------------------------------------  IN: 460 mL / OUT: 1150 mL / NET: -690 mL        Physical Exam:   Gen: NAD   Neuro: A&Ox3, no focal deficits, moves all extremities spontaneously  HEENT: NCAT, EOMI, MMM   Resp: on BiPAP, non-labored breathing, no accessory muscle use, reduced breath sounds B/L, rare end expiratory wheezes  CVS: RRR, +S1 S2, no murmurs appreciated   Abd: soft, NT, ND, normoactive BS  Ext: no edema, cyanosis or clubbing  Skin: warm, dry, normal color    MEDICATIONS  (STANDING):  ALBUTerol/ipratropium for Nebulization 3 milliLiter(s) Nebulizer every 6 hours  atorvastatin 40 milliGRAM(s) Oral at bedtime  azithromycin  IVPB 500 milliGRAM(s) IV Intermittent every 24 hours  azithromycin  IVPB      buDESOnide 160 MICROgram(s)/formoterol 4.5 MICROgram(s) Inhaler 2 Puff(s) Inhalation two times a day  clopidogrel Tablet 75 milliGRAM(s) Oral daily  heparin  Injectable 5000 Unit(s) SubCutaneous every 8 hours  insulin lispro (HumaLOG) corrective regimen sliding scale   SubCutaneous Before meals and at bedtime  methylPREDNISolone sodium succinate Injectable 60 milliGRAM(s) IV Push every 6 hours  metoprolol tartrate 12.5 milliGRAM(s) Oral two times a day  tamsulosin 0.4 milliGRAM(s) Oral at bedtime  valsartan 80 milliGRAM(s) Oral daily    MEDICATIONS  (PRN):  ALBUTerol    0.083% 2.5 milliGRAM(s) Nebulizer every 2 hours PRN Shortness of Breath and/or Wheezing        Labs:                         12.2   7.57  )-----------( 263      ( 26 Apr 2019 05:27 )             39.3     04-26    141  |  103  |  13  ----------------------------<  195<H>  5.0   |  33<H>  |  1.10    Ca    9.5      26 Apr 2019 05:27  Phos  2.4     04-26  Mg     1.9     04-26    TPro  6.2  /  Alb  3.3  /  TBili  0.4  /  DBili  x   /  AST  10<L>  /  ALT  23  /  AlkPhos  78  04-26      CARDIAC MARKERS ( 26 Apr 2019 05:27 )  <.015 ng/mL / x     / 60 U/L / x     / 3.3 ng/mL  CARDIAC MARKERS ( 25 Apr 2019 16:22 )  <.015 ng/mL / x     / 69 U/L / x     / 2.9 ng/mL  CARDIAC MARKERS ( 25 Apr 2019 11:40 )  <.015 ng/mL / x     / x     / x     / 2.7 ng/mL        PT/INR - ( 26 Apr 2019 05:27 )   PT: 11.3 sec;   INR: 0.99 ratio         PTT - ( 26 Apr 2019 05:27 )  PTT:36.3 sec              CENTRAL LINE: N  SHARMA: N  A-LINE: N    GLOBAL ISSUE/BEST PRACTICE:  Analgesia: no  Sedation: no  CAM-ICU: negative  HOB elevation: yes    Stress ulcer prophylaxis: no  VTE prophylaxis: yes  Glycemic control: yes    Nutrition: yes    CODE STATUS: 24 hour events: on BiPAP overnight. Pt seen and examined at bedside. States he feels "100%." Denies CP, SOB, wheezing, chest tightness, lightheadedness.     Review of Systems:  Constitutional: No fever, chills  Neuro: No headache, lightheadedness, weakness  Resp: No cough, wheezing, shortness of breath  CVS: No chest pain, palpitations, leg swelling  GI: No abdominal pain, nausea, vomiting, + normal BM  : No dysuria, frequency, incontinence      ICU Vital Signs Last 24 Hrs  T(C): 36.1 (26 Apr 2019 07:39), Max: 36.8 (25 Apr 2019 16:35)  T(F): 97 (26 Apr 2019 07:39), Max: 98.3 (25 Apr 2019 16:35)  HR: 75 (26 Apr 2019 09:05) (57 - 138)  BP: 108/57 (26 Apr 2019 09:00) (108/57 - 174/78)  BP(mean): 77 (26 Apr 2019 09:00) (77 - 112)  ABP: --  ABP(mean): --  RR: 23 (26 Apr 2019 09:00) (12 - 35)  SpO2: 100% (26 Apr 2019 09:05) (96% - 100%)          CAPILLARY BLOOD GLUCOSE      POCT Blood Glucose.: 183 mg/dL (26 Apr 2019 06:07)      I&O's Summary    25 Apr 2019 07:01  -  26 Apr 2019 07:00  --------------------------------------------------------  IN: 460 mL / OUT: 1150 mL / NET: -690 mL        Physical Exam:   Gen: NAD   Neuro: A&Ox3, no focal deficits, moves all extremities spontaneously  HEENT: NCAT, EOMI, MMM   Resp: on BiPAP, non-labored breathing, no accessory muscle use, reduced breath sounds B/L, rare end expiratory wheezes  CVS: RRR, +S1 S2, no murmurs appreciated   Abd: soft, NT, ND, normoactive BS  Ext: no edema, cyanosis or clubbing  Skin: warm, dry, normal color    MEDICATIONS  (STANDING):  ALBUTerol/ipratropium for Nebulization 3 milliLiter(s) Nebulizer every 6 hours  atorvastatin 40 milliGRAM(s) Oral at bedtime  azithromycin  IVPB 500 milliGRAM(s) IV Intermittent every 24 hours  azithromycin  IVPB      buDESOnide 160 MICROgram(s)/formoterol 4.5 MICROgram(s) Inhaler 2 Puff(s) Inhalation two times a day  clopidogrel Tablet 75 milliGRAM(s) Oral daily  heparin  Injectable 5000 Unit(s) SubCutaneous every 8 hours  insulin lispro (HumaLOG) corrective regimen sliding scale   SubCutaneous Before meals and at bedtime  methylPREDNISolone sodium succinate Injectable 60 milliGRAM(s) IV Push every 6 hours  metoprolol tartrate 12.5 milliGRAM(s) Oral two times a day  tamsulosin 0.4 milliGRAM(s) Oral at bedtime  valsartan 80 milliGRAM(s) Oral daily    MEDICATIONS  (PRN):  ALBUTerol    0.083% 2.5 milliGRAM(s) Nebulizer every 2 hours PRN Shortness of Breath and/or Wheezing        Labs:                         12.2   7.57  )-----------( 263      ( 26 Apr 2019 05:27 )             39.3     04-26    141  |  103  |  13  ----------------------------<  195<H>  5.0   |  33<H>  |  1.10    Ca    9.5      26 Apr 2019 05:27  Phos  2.4     04-26  Mg     1.9     04-26    TPro  6.2  /  Alb  3.3  /  TBili  0.4  /  DBili  x   /  AST  10<L>  /  ALT  23  /  AlkPhos  78  04-26      CARDIAC MARKERS ( 26 Apr 2019 05:27 )  <.015 ng/mL / x     / 60 U/L / x     / 3.3 ng/mL  CARDIAC MARKERS ( 25 Apr 2019 16:22 )  <.015 ng/mL / x     / 69 U/L / x     / 2.9 ng/mL  CARDIAC MARKERS ( 25 Apr 2019 11:40 )  <.015 ng/mL / x     / x     / x     / 2.7 ng/mL        PT/INR - ( 26 Apr 2019 05:27 )   PT: 11.3 sec;   INR: 0.99 ratio         PTT - ( 26 Apr 2019 05:27 )  PTT:36.3 sec              CENTRAL LINE: N  SHARMA: N  A-LINE: N    GLOBAL ISSUE/BEST PRACTICE:  Analgesia: no  Sedation: no  CAM-ICU: negative  HOB elevation: yes    Stress ulcer prophylaxis: no  VTE prophylaxis: yes  Glycemic control: yes    Nutrition: yes    CODE STATUS: full 24 hour events: on BiPAP overnight. Transitioned to NC in am.  Pt seen and examined at bedside. States he feels "100%." Denies CP, SOB, wheezing, chest tightness, lightheadedness.     Review of Systems:  Constitutional: No fever, chills  Neuro: No headache, lightheadedness, weakness  Resp: No cough, wheezing, shortness of breath  CVS: No chest pain, palpitations, leg swelling  GI: No abdominal pain, nausea, vomiting, + normal BM  : No dysuria, frequency, incontinence      ICU Vital Signs Last 24 Hrs  T(C): 36.1 (26 Apr 2019 07:39), Max: 36.8 (25 Apr 2019 16:35)  T(F): 97 (26 Apr 2019 07:39), Max: 98.3 (25 Apr 2019 16:35)  HR: 75 (26 Apr 2019 09:05) (57 - 138)  BP: 108/57 (26 Apr 2019 09:00) (108/57 - 174/78)  BP(mean): 77 (26 Apr 2019 09:00) (77 - 112)  ABP: --  ABP(mean): --  RR: 23 (26 Apr 2019 09:00) (12 - 35)  SpO2: 100% (26 Apr 2019 09:05) (96% - 100%)          CAPILLARY BLOOD GLUCOSE      POCT Blood Glucose.: 183 mg/dL (26 Apr 2019 06:07)      I&O's Summary    25 Apr 2019 07:01  -  26 Apr 2019 07:00  --------------------------------------------------------  IN: 460 mL / OUT: 1150 mL / NET: -690 mL        Physical Exam:   Gen: NAD   Neuro: A&Ox3, no focal deficits, moves all extremities spontaneously  HEENT: NCAT, EOMI, MMM   Resp: on BiPAP, non-labored breathing, no accessory muscle use, reduced breath sounds B/L, rare end expiratory wheezes  CVS: RRR, +S1 S2, no murmurs appreciated   Abd: soft, NT, ND, normoactive BS  Ext: no edema, cyanosis or clubbing  Skin: warm, dry, normal color    MEDICATIONS  (STANDING):  ALBUTerol/ipratropium for Nebulization 3 milliLiter(s) Nebulizer every 6 hours  atorvastatin 40 milliGRAM(s) Oral at bedtime  azithromycin  IVPB 500 milliGRAM(s) IV Intermittent every 24 hours  azithromycin  IVPB      buDESOnide 160 MICROgram(s)/formoterol 4.5 MICROgram(s) Inhaler 2 Puff(s) Inhalation two times a day  clopidogrel Tablet 75 milliGRAM(s) Oral daily  heparin  Injectable 5000 Unit(s) SubCutaneous every 8 hours  insulin lispro (HumaLOG) corrective regimen sliding scale   SubCutaneous Before meals and at bedtime  methylPREDNISolone sodium succinate Injectable 60 milliGRAM(s) IV Push every 6 hours  metoprolol tartrate 12.5 milliGRAM(s) Oral two times a day  tamsulosin 0.4 milliGRAM(s) Oral at bedtime  valsartan 80 milliGRAM(s) Oral daily    MEDICATIONS  (PRN):  ALBUTerol    0.083% 2.5 milliGRAM(s) Nebulizer every 2 hours PRN Shortness of Breath and/or Wheezing        Labs:                         12.2   7.57  )-----------( 263      ( 26 Apr 2019 05:27 )             39.3     04-26    141  |  103  |  13  ----------------------------<  195<H>  5.0   |  33<H>  |  1.10    Ca    9.5      26 Apr 2019 05:27  Phos  2.4     04-26  Mg     1.9     04-26    TPro  6.2  /  Alb  3.3  /  TBili  0.4  /  DBili  x   /  AST  10<L>  /  ALT  23  /  AlkPhos  78  04-26      CARDIAC MARKERS ( 26 Apr 2019 05:27 )  <.015 ng/mL / x     / 60 U/L / x     / 3.3 ng/mL  CARDIAC MARKERS ( 25 Apr 2019 16:22 )  <.015 ng/mL / x     / 69 U/L / x     / 2.9 ng/mL  CARDIAC MARKERS ( 25 Apr 2019 11:40 )  <.015 ng/mL / x     / x     / x     / 2.7 ng/mL        PT/INR - ( 26 Apr 2019 05:27 )   PT: 11.3 sec;   INR: 0.99 ratio         PTT - ( 26 Apr 2019 05:27 )  PTT:36.3 sec              CENTRAL LINE: N  SHARMA: N  A-LINE: N    GLOBAL ISSUE/BEST PRACTICE:  Analgesia: no  Sedation: no  CAM-ICU: negative  HOB elevation: yes    Stress ulcer prophylaxis: no  VTE prophylaxis: yes  Glycemic control: yes    Nutrition: yes    CODE STATUS: full

## 2019-04-26 NOTE — PROGRESS NOTE ADULT - SUBJECTIVE AND OBJECTIVE BOX
Patient is a 78y old  Male who presents with a chief complaint of dyspnea (25 Apr 2019 18:35)    24 hour events: Pt seen and examined at bedside. Chart/labs reviewed.   PAST MEDICAL & SURGICAL HISTORY:  PVD (peripheral vascular disease)  Hypertension  COPD (chronic obstructive pulmonary disease)  Osteomyelitis  Dyslipidemia  CAD (Coronary Artery Disease)  Diabetes Mellitus, Type II  Compound fracture: left leg  CABG (Coronary Artery Bypass Graft)      Review of Systems:  Constitutional: No fever, chills, fatigue  Neuro: No headache, numbness, weakness  Resp: No cough, wheezing, shortness of breath  CVS: No chest pain, palpitations, leg swelling  GI: No abdominal pain, nausea, vomiting, diarrhea   : No dysuria, frequency, incontinence  Skin: No itching, burning, rashes, or lesions   Msk: No joint pain or swelling  Psych: No depression, anxiety, mood swings    T(F): 97.9 (04-25-19 @ 20:01), Max: 98.3 (04-25-19 @ 16:35)  HR: 77 (04-25-19 @ 23:41) (72 - 138)  BP: 123/65 (04-25-19 @ 22:00) (123/65 - 174/78)  RR: 31 (04-25-19 @ 22:00) (15 - 35)  SpO2: 98% (04-25-19 @ 23:41) (96% - 100%)  Wt(kg): --        CAPILLARY BLOOD GLUCOSE      POCT Blood Glucose.: 211 mg/dL (25 Apr 2019 21:31)      I&O's Summary    25 Apr 2019 07:01  -  26 Apr 2019 00:17  --------------------------------------------------------  IN: 100 mL / OUT: 550 mL / NET: -450 mL        Physical Exam:     Gen:   Neuro: A&Ox3, non-focal  HEENT: NC/AT  Resp: CTA b/l  CVS: nl S1/S2, RRR  Abd: soft, nt, nd, +bs  Ext: no edema, +pulses  Skin: well perfused, warm    Meds:  azithromycin  IVPB IV Intermittent  azithromycin  IVPB     metoprolol tartrate Oral  tamsulosin Oral  valsartan Oral    atorvastatin Oral  insulin lispro (HumaLOG) corrective regimen sliding scale SubCutaneous  methylPREDNISolone sodium succinate Injectable IV Push    ALBUTerol    0.083% Nebulizer PRN  ALBUTerol/ipratropium for Nebulization Nebulizer  buDESOnide 160 MICROgram(s)/formoterol 4.5 MICROgram(s) Inhaler Inhalation        clopidogrel Tablet Oral  heparin  Injectable SubCutaneous                                        12.1   9.42  )-----------( 264      ( 25 Apr 2019 16:22 )             39.3       04-25    140  |  105  |  12  ----------------------------<  236<H>  4.9   |  32<H>  |  1.10    Ca    9.0      25 Apr 2019 16:22  Phos  3.0     04-25  Mg     2.0     04-25    TPro  6.6  /  Alb  3.5  /  TBili  0.4  /  DBili  x   /  AST  13<L>  /  ALT  23  /  AlkPhos  90  04-25    Lactate 0.6           04-25 @ 11:40      CARDIAC MARKERS ( 25 Apr 2019 16:22 )  <.015 ng/mL / x     / 69 U/L / x     / 2.9 ng/mL  CARDIAC MARKERS ( 25 Apr 2019 11:40 )  <.015 ng/mL / x     / x     / x     / 2.7 ng/mL      PT/INR - ( 25 Apr 2019 16:22 )   PT: 11.3 sec;   INR: 1.00 ratio         PTT - ( 25 Apr 2019 16:22 )  PTT:37.8 sec            CXR:    CTH:    CT Chest:    CT A/P:    TTE:        CENTRAL LINE: yes/no    SHARMA: yes/no    A-LINE: yes/no    GLOBAL ISSUE/BEST PRACTICE:  Analgesia:  Sedation:  HOB elevation: yes  Stress ulcer prophylaxis:  VTE prophylaxis:  Glycemic control:  Nutrition:      CODE STATUS: ***  GO discussion: Y  Critical care time spent (mins): ***  (Reviewing data, imaging, discussing with multidisciplinary team, non inclusive of procedures, discussing goals of care with patient/family) 78M with PMhx of PVD, htn, hld, copd on home O2, asthma, osteomyelitis, cad s/p stents, s/p cabg, dm, HFpEF of 55%, 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 Chart/labs reviewed. Remains on Bipap support      PAST MEDICAL & SURGICAL HISTORY:  PVD (peripheral vascular disease)  Hypertension  COPD (chronic obstructive pulmonary disease)  Osteomyelitis  Dyslipidemia  CAD (Coronary Artery Disease)  Diabetes Mellitus, Type II  Compound fracture: left leg  CABG (Coronary Artery Bypass Graft)      Review of Systems:  Constitutional: No fever, chills, fatigue  Neuro: No headache, numbness, weakness  Resp: No cough, wheezing, +shortness of breath  CVS: No chest pain, palpitations, leg swelling  GI: No abdominal pain, nausea, vomiting, diarrhea   : No dysuria, frequency, incontinence  Skin: No itching, burning, rashes, or lesions   Msk: No joint pain or swelling  Psych: No depression, anxiety, mood swings      T(F): 97.9 (04-25-19 @ 20:01), Max: 98.3 (04-25-19 @ 16:35)  HR: 77 (04-25-19 @ 23:41) (72 - 138)  BP: 123/65 (04-25-19 @ 22:00) (123/65 - 174/78)  RR: 31 (04-25-19 @ 22:00) (15 - 35)  SpO2: 98% (04-25-19 @ 23:41) (96% - 100%)  Wt(kg): --        CAPILLARY BLOOD GLUCOSE      POCT Blood Glucose.: 211 mg/dL (25 Apr 2019 21:31)      I&O's Summary    25 Apr 2019 07:01  -  26 Apr 2019 00:17  --------------------------------------------------------  IN: 100 mL / OUT: 550 mL / NET: -450 mL        Physical Exam:     Gen: Mild respiratory distress  Neuro: A&Ox3, non-focal  HEENT: NC/AT  Resp: + wheeze, Decrease BS left base  CVS: nl S1/S2, RRR  Abd: soft, nt, nd, +bs  Ext: no edema, +pulses  Skin: well perfused, warm      Meds:    azithromycin  IVPB IV Intermittent  azithromycin  IVPB   metoprolol tartrate Oral  tamsulosin Oral  valsartan Oral  atorvastatin Oral  insulin lispro (HumaLOG) corrective regimen sliding scale SubCutaneous  methylPREDNISolone sodium succinate Injectable IV Push  ALBUTerol    0.083% Nebulizer PRN  ALBUTerol/ipratropium for Nebulization Nebulizer  buDESOnide 160 MICROgram(s)/formoterol 4.5 MICROgram(s) Inhaler Inhalation  clopidogrel Tablet Oral  heparin  Injectable SubCutaneous                              12.1   9.42  )-----------( 264      ( 25 Apr 2019 16:22 )             39.3       04-25    140  |  105  |  12  ----------------------------<  236<H>  4.9   |  32<H>  |  1.10    Ca    9.0      25 Apr 2019 16:22  Phos  3.0     04-25  Mg     2.0     04-25    TPro  6.6  /  Alb  3.5  /  TBili  0.4  /  DBili  x   /  AST  13<L>  /  ALT  23  /  AlkPhos  90  04-25    Lactate 0.6           04-25 @ 11:40      CARDIAC MARKERS ( 25 Apr 2019 16:22 )  <.015 ng/mL / x     / 69 U/L / x     / 2.9 ng/mL  CARDIAC MARKERS ( 25 Apr 2019 11:40 )  <.015 ng/mL / x     / x     / x     / 2.7 ng/mL      PT/INR - ( 25 Apr 2019 16:22 )   PT: 11.3 sec;   INR: 1.00 ratio         PTT - ( 25 Apr 2019 16:22 )  PTT:37.8 sec            CXR:      INTERPRETATION: AP semierect chest on April 25, 2019 11:36 AM. Patient is   short of breath. 2 views obtained.     COPD configuration to the chest again noted. Sternotomy again seen. Heart   remains normal in size.     On April 4 this year there was a minimal left base pleural reaction. This is   again noted and suggest chronic etiology.     IMPRESSION: Unchanged chest as above.             CENTRAL LINE: no    SHARMA: no    A-LINE: no    GLOBAL ISSUE/BEST PRACTICE:  Analgesia: no  Sedation: no  HOB elevation: yes  Stress ulcer prophylaxis: no  VTE prophylaxis: yes  Glycemic control: yes  Nutrition: NPO      CODE STATUS: Full  GOC discussion: Y  Critical care time spent (mins): 38  (Reviewing data, imaging, discussing with multidisciplinary team, non inclusive of procedures, discussing goals of care with patient/family)

## 2019-04-26 NOTE — PROGRESS NOTE ADULT - ASSESSMENT
78 yr old male with stated Hx significant for COPD, Asthma on home O2/Bipap therapy, CAD, CABG '04, PVD s/p stents (remote), frequent hospitalizations for COPD/Asthma exacerbations with most recent beginning of April 2019 who now presents with progressive sob/wheezes over past 24 hrs without relief from albuterol neb tx and bipap therapy. Found to be in hypercapnic resp failure secondary to COPD exacerbation vs asthma exacerbation Consulted for tachycardia     - there is no evidence of acute ischemia.  - CE negative x2  - ECG ST @ 103 w/o ischemic changes  - Tachycardia likely reactive, now improved with improvement in his breathing status.    - Continue BIPAP for respiratory support for acute hypercapnic respiratory failure  - Monitor respiratory status closely.     - there is no evidence of significant arrhythmia.  - CW home dose metropolol, plavix, statin, valsartan  - Reportedly had SVT in ED that responded to carotid massage and IV dilt.  This was likely reactive as well.  No strips available for review    - there is no evidence for meaningful  volume overload.  - , chest xray unchaged from 4/4 showing minimal L pleural reaction chronic in nature  - TTE from 3/12/19  shows EF 55% w/ mild MR and trace Ar w/ Grade 1 DD no need to repeat at this time    - Further cardiac workup will depend on clinical course.   - All other workup per ICU team

## 2019-04-26 NOTE — DIETITIAN INITIAL EVALUATION ADULT. - OTHER INFO
per pt/wife, they watch diet carefully, but were not aware of CHO counts of frequently consumed foods. Tests glu 2x day, 130-180 most times

## 2019-04-26 NOTE — PROGRESS NOTE ADULT - ASSESSMENT
Assessment:  78 yr old male with stated Hx significant for COPD, Asthma on home O2/Bipap therapy, CAD, CABG '04, PVD s/p stents (remote), frequent hospitalizations for COPD/Asthma exacerbations with most recent beginning of April 2019 who now presents with progressive sob/wheezes over past 24 hrs without relief from albuterol neb tx and bipap therapy. found to be in hypercapnic resp failure secondary to COPD exacerbation vs asthma exacerbation    Plan:    #Neuro: A&Ox3. No active issues. Neuro checks q routine. physical therapy consult.    #Pulm: hypercapnic respiratory failure sec to COPD exacerbation. Transitioned to NC. Bipap therapy qHS. Continue duonebs, symbicort, steroids, azithromycin.     #CV: Hx of CABG/CAD - continue statin, plavix. Cardiac enzymes negative. Hx HTN - continue losartan, metoprolol. dCHF. Hx of PEA arrest 2/2 to status asthmaticus 10/2018. F/u TTE.    #GI: diet advanced to consistent carbs.     #: Hx of BPH -home med of tamsulosin - will continue. strict I & O's - keep even    #ID: RVP - neg, F/u cultures, urine for legionella. Continue azithromax 500 mg IV qd for COPD exacerbation.     #ENDO Hx DM2 on metformin/glipizide. f/u HA1c. FS with meals and at bedtime. Mod insulin sliding coverage. Maintain -160.    #HEME: DVT prophylaxis with lovenox Assessment:  78 yr old male with stated Hx significant for COPD, Asthma on home O2/Bipap therapy, CAD, CABG '04, PVD s/p stents (remote), frequent hospitalizations for COPD/Asthma exacerbations with most recent beginning of April 2019 who now presents with progressive sob/wheezes over past 24 hrs without relief from albuterol neb tx and bipap therapy. found to be in hypercapnic resp failure secondary to COPD exacerbation vs asthma exacerbation    Plan:    --Neuro: A&Ox3. No active issues. Neuro checks q routine. physical therapy consult.  --Pulm: hypercapnic respiratory failure sec to COPD exacerbation. Transitioned to NC. Bipap therapy qHS. Continue duonebs, symbicort, steroids, azithromycin.   --CV: Hx of CABG/CAD - continue statin, plavix. Cardiac enzymes negative x3. Hx HTN - continue losartan, metoprolol. dCHF. Hx of PEA arrest 2/2 to status asthmaticus 10/2018. F/u TTE.  --GI: diet advanced to consistent carbs.   --: Stable kidney function. Strict I&Os - keep even. Hx of BPH -home med of tamsulosin - will continue.   --ID: RVP - neg, F/u cultures, urine for legionella. Continue azithromax 500 mg IV qd for COPD exacerbation.   --ENDO: Hx DM2 on metformin/glipizide. HA1c - 6.9%. FS with meals and at bedtime. Mod insulin sliding coverage. Maintain -180.  --HEME: DVT prophylaxis with lovenox

## 2019-04-26 NOTE — PROGRESS NOTE ADULT - ASSESSMENT
78M with PMhx of PVD, htn, hld, copd on home O2, asthma, osteomyelitis, cad s/p stents, s/p cabg, dm, HFpEF of 55%, bradycardia/PEA arrest 10/2018, admitted to ICU with hypercapnic respiratory failure, asthma exacerbation,     -Neuro stable  -HFpEF 55%. Not requiring pressors at this time. No acute signs of HF. Will Monitor  -Restart Home HTN medications as tolerated once able to take off bipap  -Continue bipap support. Pt is slowly responding to current management. Remains with low threshold for intubation  -Continue steroids/nebs. Given Mg earlier. Monitor O2 saturation; keep > 88%  -NPO while on bipap  -Empiric azithromycin. Monitor wbc/temp. Check RVP, sputum cx, MRSA swab, legionella  -DVT ppx  -Pt is critically ill

## 2019-04-26 NOTE — PHARMACOTHERAPY INTERVENTION NOTE - COMMENTS
Performed medication reconciliation based off list provided by family. Updates reflected in current outpatient medication list.
Patient on IV azithromycin therapy. Patient is tolerating other PO medications so recommended changing antibiotic to PO.

## 2019-04-27 LAB
ALBUMIN SERPL ELPH-MCNC: 3.2 G/DL — LOW (ref 3.3–5)
ALP SERPL-CCNC: 78 U/L — SIGNIFICANT CHANGE UP (ref 40–120)
ALT FLD-CCNC: 19 U/L — SIGNIFICANT CHANGE UP (ref 12–78)
ANION GAP SERPL CALC-SCNC: 4 MMOL/L — LOW (ref 5–17)
AST SERPL-CCNC: 7 U/L — LOW (ref 15–37)
BASOPHILS # BLD AUTO: 0.01 K/UL — SIGNIFICANT CHANGE UP (ref 0–0.2)
BASOPHILS NFR BLD AUTO: 0.1 % — SIGNIFICANT CHANGE UP (ref 0–2)
BILIRUB SERPL-MCNC: 0.3 MG/DL — SIGNIFICANT CHANGE UP (ref 0.2–1.2)
BUN SERPL-MCNC: 20 MG/DL — SIGNIFICANT CHANGE UP (ref 7–23)
CALCIUM SERPL-MCNC: 9.3 MG/DL — SIGNIFICANT CHANGE UP (ref 8.5–10.1)
CHLORIDE SERPL-SCNC: 102 MMOL/L — SIGNIFICANT CHANGE UP (ref 96–108)
CO2 SERPL-SCNC: 35 MMOL/L — HIGH (ref 22–31)
CREAT SERPL-MCNC: 1.2 MG/DL — SIGNIFICANT CHANGE UP (ref 0.5–1.3)
EOSINOPHIL # BLD AUTO: 0 K/UL — SIGNIFICANT CHANGE UP (ref 0–0.5)
EOSINOPHIL NFR BLD AUTO: 0 % — SIGNIFICANT CHANGE UP (ref 0–6)
GLUCOSE SERPL-MCNC: 190 MG/DL — HIGH (ref 70–99)
HCT VFR BLD CALC: 38.3 % — LOW (ref 39–50)
HGB BLD-MCNC: 11.8 G/DL — LOW (ref 13–17)
IMM GRANULOCYTES NFR BLD AUTO: 0.7 % — SIGNIFICANT CHANGE UP (ref 0–1.5)
LEGIONELLA AG UR QL: NEGATIVE — SIGNIFICANT CHANGE UP
LYMPHOCYTES # BLD AUTO: 0.88 K/UL — LOW (ref 1–3.3)
LYMPHOCYTES # BLD AUTO: 5.3 % — LOW (ref 13–44)
MAGNESIUM SERPL-MCNC: 2.1 MG/DL — SIGNIFICANT CHANGE UP (ref 1.6–2.6)
MCHC RBC-ENTMCNC: 28 PG — SIGNIFICANT CHANGE UP (ref 27–34)
MCHC RBC-ENTMCNC: 30.8 GM/DL — LOW (ref 32–36)
MCV RBC AUTO: 90.8 FL — SIGNIFICANT CHANGE UP (ref 80–100)
MONOCYTES # BLD AUTO: 0.98 K/UL — HIGH (ref 0–0.9)
MONOCYTES NFR BLD AUTO: 5.9 % — SIGNIFICANT CHANGE UP (ref 2–14)
NEUTROPHILS # BLD AUTO: 14.55 K/UL — HIGH (ref 1.8–7.4)
NEUTROPHILS NFR BLD AUTO: 88 % — HIGH (ref 43–77)
NRBC # BLD: 0 /100 WBCS — SIGNIFICANT CHANGE UP (ref 0–0)
PHOSPHATE SERPL-MCNC: 3.6 MG/DL — SIGNIFICANT CHANGE UP (ref 2.5–4.5)
PLATELET # BLD AUTO: 298 K/UL — SIGNIFICANT CHANGE UP (ref 150–400)
POTASSIUM SERPL-MCNC: 4.7 MMOL/L — SIGNIFICANT CHANGE UP (ref 3.5–5.3)
POTASSIUM SERPL-SCNC: 4.7 MMOL/L — SIGNIFICANT CHANGE UP (ref 3.5–5.3)
PROT SERPL-MCNC: 6 G/DL — SIGNIFICANT CHANGE UP (ref 6–8.3)
RBC # BLD: 4.22 M/UL — SIGNIFICANT CHANGE UP (ref 4.2–5.8)
RBC # FLD: 13.1 % — SIGNIFICANT CHANGE UP (ref 10.3–14.5)
SODIUM SERPL-SCNC: 141 MMOL/L — SIGNIFICANT CHANGE UP (ref 135–145)
WBC # BLD: 16.54 K/UL — HIGH (ref 3.8–10.5)
WBC # FLD AUTO: 16.54 K/UL — HIGH (ref 3.8–10.5)

## 2019-04-27 PROCEDURE — 99232 SBSQ HOSP IP/OBS MODERATE 35: CPT

## 2019-04-27 RX ADMIN — Medication 6: at 12:18

## 2019-04-27 RX ADMIN — ATORVASTATIN CALCIUM 40 MILLIGRAM(S): 80 TABLET, FILM COATED ORAL at 21:05

## 2019-04-27 RX ADMIN — Medication 40 MILLIGRAM(S): at 05:14

## 2019-04-27 RX ADMIN — Medication 2: at 08:28

## 2019-04-27 RX ADMIN — Medication 12.5 MILLIGRAM(S): at 17:23

## 2019-04-27 RX ADMIN — CLOPIDOGREL BISULFATE 75 MILLIGRAM(S): 75 TABLET, FILM COATED ORAL at 12:09

## 2019-04-27 RX ADMIN — BUDESONIDE AND FORMOTEROL FUMARATE DIHYDRATE 2 PUFF(S): 160; 4.5 AEROSOL RESPIRATORY (INHALATION) at 12:09

## 2019-04-27 RX ADMIN — Medication 3 MILLILITER(S): at 03:18

## 2019-04-27 RX ADMIN — Medication 3 MILLILITER(S): at 19:39

## 2019-04-27 RX ADMIN — Medication 40 MILLIGRAM(S): at 17:23

## 2019-04-27 RX ADMIN — BUDESONIDE AND FORMOTEROL FUMARATE DIHYDRATE 2 PUFF(S): 160; 4.5 AEROSOL RESPIRATORY (INHALATION) at 21:05

## 2019-04-27 RX ADMIN — Medication 2: at 17:22

## 2019-04-27 RX ADMIN — AZITHROMYCIN 500 MILLIGRAM(S): 500 TABLET, FILM COATED ORAL at 12:09

## 2019-04-27 RX ADMIN — TAMSULOSIN HYDROCHLORIDE 0.4 MILLIGRAM(S): 0.4 CAPSULE ORAL at 21:05

## 2019-04-27 RX ADMIN — Medication 3 MILLILITER(S): at 07:37

## 2019-04-27 RX ADMIN — ENOXAPARIN SODIUM 40 MILLIGRAM(S): 100 INJECTION SUBCUTANEOUS at 05:14

## 2019-04-27 RX ADMIN — Medication 3 MILLILITER(S): at 13:36

## 2019-04-27 NOTE — PROGRESS NOTE ADULT - SUBJECTIVE AND OBJECTIVE BOX
Date/Time Patient Seen:  		  Referring MD:   Data Reviewed	       Patient is a 78y old  Male who presents with a chief complaint of dyspnea (26 Apr 2019 09:42)      Subjective/HPI     PAST MEDICAL & SURGICAL HISTORY:  PVD (peripheral vascular disease)  Hypertension  Diabetes mellitus  COPD (chronic obstructive pulmonary disease)  Osteomyelitis  Asymptomatic Peripheral Vascular Disease  Dyslipidemia  CAD (Coronary Artery Disease)  Diabetes Mellitus, Type II  Compound fracture: left leg  CABG (Coronary Artery Bypass Graft)        Medication list         MEDICATIONS  (STANDING):  ALBUTerol/ipratropium for Nebulization 3 milliLiter(s) Nebulizer every 6 hours  atorvastatin 40 milliGRAM(s) Oral at bedtime  azithromycin   Tablet 500 milliGRAM(s) Oral every 24 hours  buDESOnide 160 MICROgram(s)/formoterol 4.5 MICROgram(s) Inhaler 2 Puff(s) Inhalation two times a day  clopidogrel Tablet 75 milliGRAM(s) Oral daily  enoxaparin Injectable 40 milliGRAM(s) SubCutaneous every 24 hours  insulin lispro (HumaLOG) corrective regimen sliding scale   SubCutaneous Before meals and at bedtime  methylPREDNISolone sodium succinate Injectable 40 milliGRAM(s) IV Push every 8 hours  metoprolol tartrate 12.5 milliGRAM(s) Oral two times a day  tamsulosin 0.4 milliGRAM(s) Oral at bedtime  valsartan 80 milliGRAM(s) Oral daily    MEDICATIONS  (PRN):  ALBUTerol    0.083% 2.5 milliGRAM(s) Nebulizer every 2 hours PRN Shortness of Breath and/or Wheezing         Vitals log        ICU Vital Signs Last 24 Hrs  T(C): 36.3 (27 Apr 2019 04:00), Max: 36.9 (26 Apr 2019 23:42)  T(F): 97.3 (27 Apr 2019 04:00), Max: 98.5 (26 Apr 2019 23:42)  HR: 82 (27 Apr 2019 04:00) (74 - 97)  BP: 100/65 (27 Apr 2019 04:00) (100/65 - 147/67)  BP(mean): 85 (26 Apr 2019 20:00) (77 - 101)  ABP: --  ABP(mean): --  RR: 18 (27 Apr 2019 04:00) (18 - 37)  SpO2: 100% (27 Apr 2019 04:00) (95% - 100%)           Input and Output:  I&O's Detail    26 Apr 2019 07:01  -  27 Apr 2019 07:00  --------------------------------------------------------  IN:    Oral Fluid: 1200 mL  Total IN: 1200 mL    OUT:    Voided: 850 mL  Total OUT: 850 mL    Total NET: 350 mL          Lab Data                        12.2   7.57  )-----------( 263      ( 26 Apr 2019 05:27 )             39.3     04-26    141  |  103  |  13  ----------------------------<  195<H>  5.0   |  33<H>  |  1.10    Ca    9.5      26 Apr 2019 05:27  Phos  2.4     04-26  Mg     1.9     04-26    TPro  6.2  /  Alb  3.3  /  TBili  0.4  /  DBili  x   /  AST  10<L>  /  ALT  23  /  AlkPhos  78  04-26    ABG - ( 25 Apr 2019 17:44 )  pH, Arterial: 7.31  pH, Blood: x     /  pCO2: 59    /  pO2: 95    / HCO3: 26    / Base Excess: 3.2   /  SaO2: 97                CARDIAC MARKERS ( 26 Apr 2019 05:27 )  <.015 ng/mL / x     / 60 U/L / x     / 3.3 ng/mL  CARDIAC MARKERS ( 25 Apr 2019 16:22 )  <.015 ng/mL / x     / 69 U/L / x     / 2.9 ng/mL  CARDIAC MARKERS ( 25 Apr 2019 11:40 )  <.015 ng/mL / x     / x     / x     / 2.7 ng/mL        Review of Systems	      Objective     Physical Examination    heart s1s2  lung dec BS      Pertinent Lab findings & Imaging      Eliecer:  NO   Adequate UO     I&O's Detail    26 Apr 2019 07:01  -  27 Apr 2019 07:00  --------------------------------------------------------  IN:    Oral Fluid: 1200 mL  Total IN: 1200 mL    OUT:    Voided: 850 mL  Total OUT: 850 mL    Total NET: 350 mL               Discussed with:     Cultures:	        Radiology

## 2019-04-27 NOTE — PROGRESS NOTE ADULT - PROBLEM SELECTOR PLAN 1
transferred out of ICU   on BIPAP night time and PRN  would taper steroids -   cont bronchodilators  oral and skin care  assist with ADL  I and O  cont tele monitoring  pt is full code  out of bed as tolerated  overall appears much better   copd  heart disease  atelectasis  PT assessment  check sat at rest and on exertion  will follow

## 2019-04-27 NOTE — PROGRESS NOTE ADULT - SUBJECTIVE AND OBJECTIVE BOX
[facility] -- Saud Aguilar, Dani, Medina Capps Patel, Savella  Office # [phone]      Follow Up:    Dyspnea   Subjective/Observations:   No events overnight resting comfortably in bed.  No complaints of chest pain, dyspnea, or palpitations reported. No signs of orthopnea or PND.     REVIEW OF SYSTEMS: All other review of systems is negative unless indicated above    PAST MEDICAL & SURGICAL HISTORY:  PVD (peripheral vascular disease)  Hypertension  COPD (chronic obstructive pulmonary disease)  Osteomyelitis  Dyslipidemia  CAD (Coronary Artery Disease)  Diabetes Mellitus, Type II  Compound fracture: left leg  CABG (Coronary Artery Bypass Graft)      MEDICATIONS  (STANDING):  ALBUTerol/ipratropium for Nebulization 3 milliLiter(s) Nebulizer every 6 hours  atorvastatin 40 milliGRAM(s) Oral at bedtime  azithromycin   Tablet 500 milliGRAM(s) Oral every 24 hours  buDESOnide 160 MICROgram(s)/formoterol 4.5 MICROgram(s) Inhaler 2 Puff(s) Inhalation two times a day  clopidogrel Tablet 75 milliGRAM(s) Oral daily  enoxaparin Injectable 40 milliGRAM(s) SubCutaneous every 24 hours  insulin lispro (HumaLOG) corrective regimen sliding scale   SubCutaneous Before meals and at bedtime  methylPREDNISolone sodium succinate Injectable 40 milliGRAM(s) IV Push every 8 hours  metoprolol tartrate 12.5 milliGRAM(s) Oral two times a day  tamsulosin 0.4 milliGRAM(s) Oral at bedtime  valsartan 80 milliGRAM(s) Oral daily    MEDICATIONS  (PRN):  ALBUTerol    0.083% 2.5 milliGRAM(s) Nebulizer every 2 hours PRN Shortness of Breath and/or Wheezing      Allergies    No Known Allergies    Intolerances    shellfish (Nausea)      Vital Signs Last 24 Hrs  T(C): 36.3 (27 Apr 2019 08:09), Max: 36.9 (26 Apr 2019 23:42)  T(F): 97.4 (27 Apr 2019 08:09), Max: 98.5 (26 Apr 2019 23:42)  HR: 76 (27 Apr 2019 08:09) (70 - 97)  BP: 109/68 (27 Apr 2019 08:09) (100/65 - 147/67)  BP(mean): 85 (26 Apr 2019 20:00) (80 - 101)  RR: 18 (27 Apr 2019 08:09) (18 - 37)  SpO2: 100% (27 Apr 2019 08:09) (95% - 100%)    I&O's Summary    26 Apr 2019 07:01  -  27 Apr 2019 07:00  --------------------------------------------------------  IN: 1200 mL / OUT: 850 mL / NET: 350 mL          PHYSICAL EXAM:  TELE: NSR No events on tele overnight   Constitutional: NAD, awake and alert, well-developed  HEENT: Moist Mucous Membranes, Anicteric  Pulmonary: Non-labored, breath sounds with Bilateral expiratory wheezes throughout   No  crackles or rhonchi   Cardiovascular: Regular, S1 and S2 nl, No murmurs, rubs, gallops or clicks  Gastrointestinal: Bowel Sounds present, soft, nontender.   Lymph: No lymphadenopathy. No peripheral edema.  Skin: No visible rashes or ulcers.  Psych:  Mood & affect appropriate    LABS: All Labs Reviewed:                        11.8   16.54 )-----------( 298      ( 27 Apr 2019 07:53 )             38.3                         12.2   7.57  )-----------( 263      ( 26 Apr 2019 05:27 )             39.3                         12.1   9.42  )-----------( 264      ( 25 Apr 2019 16:22 )             39.3     27 Apr 2019 07:53    141    |  102    |  20     ----------------------------<  190    4.7     |  35     |  1.20   26 Apr 2019 05:27    141    |  103    |  13     ----------------------------<  195    5.0     |  33     |  1.10   25 Apr 2019 16:22    140    |  105    |  12     ----------------------------<  236    4.9     |  32     |  1.10     Ca    9.3        27 Apr 2019 07:53  Ca    9.5        26 Apr 2019 05:27  Ca    9.0        25 Apr 2019 16:22  Phos  3.6       27 Apr 2019 07:53  Phos  2.4       26 Apr 2019 05:27  Phos  3.0       25 Apr 2019 16:22  Mg     2.1       27 Apr 2019 07:53  Mg     1.9       26 Apr 2019 05:27  Mg     2.0       25 Apr 2019 16:22    TPro  6.0    /  Alb  3.2    /  TBili  0.3    /  DBili  x      /  AST  7      /  ALT  19     /  AlkPhos  78     27 Apr 2019 07:53  TPro  6.2    /  Alb  3.3    /  TBili  0.4    /  DBili  x      /  AST  10     /  ALT  23     /  AlkPhos  78     26 Apr 2019 05:27  TPro  6.6    /  Alb  3.5    /  TBili  0.4    /  DBili  x      /  AST  13     /  ALT  23     /  AlkPhos  90     25 Apr 2019 16:22    PT/INR - ( 26 Apr 2019 05:27 )   PT: 11.3 sec;   INR: 0.99 ratio         PTT - ( 26 Apr 2019 05:27 )  PTT:36.3 sec  CARDIAC MARKERS ( 26 Apr 2019 05:27 )  <.015 ng/mL / x     / 60 U/L / x     / 3.3 ng/mL  CARDIAC MARKERS ( 25 Apr 2019 16:22 )  <.015 ng/mL / x     / 69 U/L / x     / 2.9 ng/mL  CARDIAC MARKERS ( 25 Apr 2019 11:40 )  <.015 ng/mL / x     / x     / x     / 2.7 ng/mL         ECG:  < from: 12 Lead ECG (04.06.19 @ 17:24) >  Ventricular Rate 103 BPM    Atrial Rate 103 BPM    P-R Interval 132 ms    QRS Duration 90 ms    Q-T Interval 328 ms    QTC Calculation(Bezet) 429 ms    P Axis 77 degrees    R Axis 83 degrees    T Axis 54 degrees    Diagnosis Line Sinus tachycardia  Otherwise normal ECG    Confirmed by Tito Kelly ([id number]) on 4/7/2019 11:40:55 AM    < end of copied text >    Echo:  < from: TTE Echo Doppler w/o Cont (03.12.19 @ 20:42) >  EXAM:  ECHO TTE WO CON COMP W DOPPLR         PROCEDURE DATE:  03/12/2019        INTERPRETATION:  Ordering Physician: MARTÍN CARRASQUILLO [id number]    Indication: Shortness of breath    Study Quality: Technically difficult   A complete echocardiographic study was performed utilizing standard   protocol including spectral and color Doppler in all echocardiographic   windows.    Height: 175 cm  Weight: 113 kg  BSA: 2.2 sq m  Blood Pressure: 103/61    MEASUREMENTS  IVS: 1.5cm  PWT: 1.1cm  LA: 3.1cm  AO: 3.3cm  LVIDd: 4.2cm  LVIDs: 3.3cm    RVSP: 29mmHg    FINDINGS  Left Ventricle: The endocardium is not well-visualized. In limited views,   the LV function appears to be preserved with an estimated EF of 55%.   There is paradoxical motion of the septum in the setting of prior cardiac   surgery.  Aortic Valve: Calcified aortic valve with normal opening. Trace aortic   regurgitation  Mitral Valve: Thickened and calcified mitral valve leaflets with trace to   mild mitral regurgitation  Tricuspid Valve: Grossly normal tricuspid valve. Mild tricuspid   regurgitation.  Pulmonic Valve: Not well-visualized.  Left Atrium: Grossly normal. An interatrial septal aneurysm is noted   without obvious color flow across it  Right Ventricle: In limited views, grossly normal RV size.  Right Atrium: Grossly normal  Diastolic Function: Doppler evidence of rate 1 diastolic dysfunction  Pericardium/Pleura: No significant pericardial effusion.  Hemodynamics: The pulmonary artery systolic pressure is estimated to be   29 mmHg, assuming the right atrial pressure is equal to 8 mmHg,   consistent with normal pulmonary pressures.    CONCLUSIONS:  1. Technically difficult and limited study  2. The endocardium is not well-visualized. In limited views, the LV   function appears to be preserved with an estimated EF of 55%. There is   paradoxical motion of the septum in the setting of prior cardiac surgery.  3. Calcified aortic valve with normal opening. Trace aortic regurgitation  4. Thickened and calcified mitral valve leaflets with trace to mild   mitral regurgitation  5. Doppler evidence of rate 1 diastolic dysfunction  6. The interatrial septum appears aneurysmal  7. No significant pericardial effusion.                      REJI VILA   This document has been electronically signed. Mar 14 2019 10:16AM    < end of copied text >    Radiology:  < from: Xray Chest 1 View- PORTABLE-Urgent (04.25.19 @ 11:52) >  EXAM:  XR CHEST PORTABLE URGENT 1V                            PROCEDURE DATE:  04/25/2019          INTERPRETATION:  AP semierect chest on April 25, 2019 11:36 AM. Patient   is short of breath. 2 views obtained.    COPD configuration to the chest again noted. Sternotomy again seen. Heart   remains normal in size.    On April 4 this year there was a minimal left base pleural reaction. This   is again noted and suggest chronic etiology.    IMPRESSION: Unchanged chest as above.      REJI ROTHMAN M.D., ATTENDING RADIOLOGIST  This document has been electronically signed. Apr 25 2019 11:54AM        < end of copied text >           Austin Salinas ANP   Cardiology

## 2019-04-27 NOTE — PROGRESS NOTE ADULT - ASSESSMENT
78 yr old male with stated Hx significant for COPD, Asthma on home O2/Bipap therapy, CAD, CABG '04, PVD s/p stents (remote), frequent hospitalizations for COPD/Asthma exacerbations with most recent beginning of April 2019 who now presents with progressive sob/wheezes over past 24 hrs without relief from albuterol neb tx and bipap therapy. Found to be in hypercapnic resp failure secondary to COPD exacerbation vs asthma exacerbation Consulted for tachycardia     - there is no evidence of acute ischemia.  - CE negative x2  - ECG ST @ 103 w/o ischemic changes  - Tachycardia likely reactive, now improved with improvement in his breathing status.    - Continue BIPAP for respiratory support for acute hypercapnic respiratory failure  - Monitor respiratory status closely.     - there is no evidence of significant arrhythmia.  - CW home dose metropolol, plavix, statin, valsartan  - Reportedly had SVT in ED that responded to carotid massage and IV dilt.  This was likely reactive as well.  No strips available for review    - there is no evidence for meaningful  volume overload.  - , chest xray unchaged from 4/4 showing minimal L pleural reaction chronic in nature  - TTE from 3/12/19  shows EF 55% w/ mild MR and trace Ar w/ Grade 1 DD no need to repeat at this time    -Monitor and replete lytes, keep K>4 and Mg >2  - Further cardiac workup will depend on clinical course.   - All other workup per primary team  - DVT ppx  Thank you for the consult  Will continue to follow  Austin GUY  Cardiology

## 2019-04-27 NOTE — PHYSICAL THERAPY INITIAL EVALUATION ADULT - PERTINENT HX OF CURRENT PROBLEM, REHAB EVAL
78 y Male with a PMHx of HTN, COPD (On home O2 2L and BIPAP at Nighttime), CAD w/ 3v CABG in '04, HLD, DM2 (on Metformin/Glipizide), hx Hypercapnic Resp Failure/Cardiac Arrest requiring intubation (6/'18), Presents to the ED for increased SOB for the past 2 days. Pt was recently admitted to [facility] on 4/4/19 for SOB/Resp Failure, treated with abx/Steroid therapy and BIPAP.

## 2019-04-27 NOTE — PHYSICAL THERAPY INITIAL EVALUATION ADULT - ADDITIONAL COMMENTS
Pt reports he lives in private home with 15 [address] with railings.  Pt's bedroom and bathroom are on second floor. Pt was independent in all ADLs and ambulates independently without a device.  Pt has access to RW, SAC and has tub shower and shower stall with seat.

## 2019-04-27 NOTE — PROGRESS NOTE ADULT - SUBJECTIVE AND OBJECTIVE BOX
CHIEF COMPLAINT/INTERVAL HISTORY:  Pt. seen and evaluated for acute hypercapnic respiratory failure and acute copd exacerbation.  Pt. is feeling much better.  Tolerating IV steroids and PO antibiotic.      REVIEW OF SYSTEMS:  No fever, CP, SOB, or abdominal pain.     Vital Signs Last 24 Hrs  T(C): 36.3 (27 Apr 2019 08:09), Max: 36.9 (26 Apr 2019 23:42)  T(F): 97.4 (27 Apr 2019 08:09), Max: 98.5 (26 Apr 2019 23:42)  HR: 76 (27 Apr 2019 08:09) (70 - 97)  BP: 109/68 (27 Apr 2019 08:09) (100/65 - 147/67)  BP(mean): 85 (26 Apr 2019 20:00) (82 - 101)  RR: 18 (27 Apr 2019 08:09) (18 - 37)  SpO2: 100% (27 Apr 2019 08:09) (95% - 100%)    PHYSICAL EXAM:  GENERAL: NAD  HEENT: EOMI, hearing normal, conjunctiva and sclera clear  Chest: scant exp wheeze in RUL field  CV: S1S2, RRR,   GI: soft, +BS, NT/ND  Musculoskeletal: no edema  Psychiatric: affect nL, mood nL  Skin: warm and dry    LABS:                        11.8   16.54 )-----------( 298      ( 27 Apr 2019 07:53 )             38.3     04-27    141  |  102  |  20  ----------------------------<  190<H>  4.7   |  35<H>  |  1.20    Ca    9.3      27 Apr 2019 07:53  Phos  3.6     04-27  Mg     2.1     04-27    TPro  6.0  /  Alb  3.2<L>  /  TBili  0.3  /  DBili  x   /  AST  7<L>  /  ALT  19  /  AlkPhos  78  04-27    PT/INR - ( 26 Apr 2019 05:27 )   PT: 11.3 sec;   INR: 0.99 ratio         PTT - ( 26 Apr 2019 05:27 )  PTT:36.3 sec      Assessment and Plan:  -Acute hypercapnic respiratory failure 2/2 acute copd exacerbation:  Clinically improved and down graded from ICU.  Change solu-medrol to 40mg IV Q12h.  Continue Azithromycin 500mg PO daily, Duoneb tx Q6h, Symbicort inh BID, and O2 support.  Pulmonary f/u  -CAD:  continue Plavix 75mg PO daily, metoprolol 12.5mg PO BID, and Lipitor 40mg PO QHS  -PVD:  continue Plavix and statin  -HTN:  continue metoprolol 12.5mg PO BID and Diovan 80mg PO daily  -HLD:  continue statin therapy  -Type 2 DM:  continue humalog sliding scale  -BPH:  continue Flomax 0.4mg PO QHS  -VTE ppx:  Lovenox 40mg SQ daily Alert/Cooperative/Awake

## 2019-04-28 ENCOUNTER — TRANSCRIPTION ENCOUNTER (OUTPATIENT)
Age: 79
End: 2019-04-28

## 2019-04-28 VITALS
HEART RATE: 81 BPM | DIASTOLIC BLOOD PRESSURE: 75 MMHG | OXYGEN SATURATION: 100 % | SYSTOLIC BLOOD PRESSURE: 115 MMHG | TEMPERATURE: 99 F | RESPIRATION RATE: 18 BRPM

## 2019-04-28 LAB
ANION GAP SERPL CALC-SCNC: 4 MMOL/L — LOW (ref 5–17)
BUN SERPL-MCNC: 20 MG/DL — SIGNIFICANT CHANGE UP (ref 7–23)
CALCIUM SERPL-MCNC: 9.1 MG/DL — SIGNIFICANT CHANGE UP (ref 8.5–10.1)
CHLORIDE SERPL-SCNC: 99 MMOL/L — SIGNIFICANT CHANGE UP (ref 96–108)
CO2 SERPL-SCNC: 36 MMOL/L — HIGH (ref 22–31)
CREAT SERPL-MCNC: 1.1 MG/DL — SIGNIFICANT CHANGE UP (ref 0.5–1.3)
GLUCOSE SERPL-MCNC: 317 MG/DL — HIGH (ref 70–99)
HCT VFR BLD CALC: 39.2 % — SIGNIFICANT CHANGE UP (ref 39–50)
HGB BLD-MCNC: 12 G/DL — LOW (ref 13–17)
MAGNESIUM SERPL-MCNC: 2.1 MG/DL — SIGNIFICANT CHANGE UP (ref 1.6–2.6)
MCHC RBC-ENTMCNC: 27.7 PG — SIGNIFICANT CHANGE UP (ref 27–34)
MCHC RBC-ENTMCNC: 30.6 GM/DL — LOW (ref 32–36)
MCV RBC AUTO: 90.5 FL — SIGNIFICANT CHANGE UP (ref 80–100)
NRBC # BLD: 0 /100 WBCS — SIGNIFICANT CHANGE UP (ref 0–0)
PLATELET # BLD AUTO: 269 K/UL — SIGNIFICANT CHANGE UP (ref 150–400)
POTASSIUM SERPL-MCNC: 4.7 MMOL/L — SIGNIFICANT CHANGE UP (ref 3.5–5.3)
POTASSIUM SERPL-SCNC: 4.7 MMOL/L — SIGNIFICANT CHANGE UP (ref 3.5–5.3)
RBC # BLD: 4.33 M/UL — SIGNIFICANT CHANGE UP (ref 4.2–5.8)
RBC # FLD: 12.9 % — SIGNIFICANT CHANGE UP (ref 10.3–14.5)
SODIUM SERPL-SCNC: 139 MMOL/L — SIGNIFICANT CHANGE UP (ref 135–145)
WBC # BLD: 9.84 K/UL — SIGNIFICANT CHANGE UP (ref 3.8–10.5)
WBC # FLD AUTO: 9.84 K/UL — SIGNIFICANT CHANGE UP (ref 3.8–10.5)

## 2019-04-28 PROCEDURE — 84145 PROCALCITONIN (PCT): CPT

## 2019-04-28 PROCEDURE — 87040 BLOOD CULTURE FOR BACTERIA: CPT

## 2019-04-28 PROCEDURE — 99239 HOSP IP/OBS DSCHRG MGMT >30: CPT

## 2019-04-28 PROCEDURE — 71045 X-RAY EXAM CHEST 1 VIEW: CPT

## 2019-04-28 PROCEDURE — 36415 COLL VENOUS BLD VENIPUNCTURE: CPT

## 2019-04-28 PROCEDURE — 87798 DETECT AGENT NOS DNA AMP: CPT

## 2019-04-28 PROCEDURE — 83036 HEMOGLOBIN GLYCOSYLATED A1C: CPT

## 2019-04-28 PROCEDURE — 85610 PROTHROMBIN TIME: CPT

## 2019-04-28 PROCEDURE — 99285 EMERGENCY DEPT VISIT HI MDM: CPT | Mod: 25

## 2019-04-28 PROCEDURE — 94760 N-INVAS EAR/PLS OXIMETRY 1: CPT

## 2019-04-28 PROCEDURE — 97161 PT EVAL LOW COMPLEX 20 MIN: CPT

## 2019-04-28 PROCEDURE — 99232 SBSQ HOSP IP/OBS MODERATE 35: CPT

## 2019-04-28 PROCEDURE — 87449 NOS EACH ORGANISM AG IA: CPT

## 2019-04-28 PROCEDURE — 83605 ASSAY OF LACTIC ACID: CPT

## 2019-04-28 PROCEDURE — 80053 COMPREHEN METABOLIC PANEL: CPT

## 2019-04-28 PROCEDURE — 94660 CPAP INITIATION&MGMT: CPT

## 2019-04-28 PROCEDURE — 87486 CHLMYD PNEUM DNA AMP PROBE: CPT

## 2019-04-28 PROCEDURE — 82553 CREATINE MB FRACTION: CPT

## 2019-04-28 PROCEDURE — 84100 ASSAY OF PHOSPHORUS: CPT

## 2019-04-28 PROCEDURE — 84484 ASSAY OF TROPONIN QUANT: CPT

## 2019-04-28 PROCEDURE — 36600 WITHDRAWAL OF ARTERIAL BLOOD: CPT

## 2019-04-28 PROCEDURE — 82550 ASSAY OF CK (CPK): CPT

## 2019-04-28 PROCEDURE — 94640 AIRWAY INHALATION TREATMENT: CPT

## 2019-04-28 PROCEDURE — 87631 RESP VIRUS 3-5 TARGETS: CPT

## 2019-04-28 PROCEDURE — 85730 THROMBOPLASTIN TIME PARTIAL: CPT

## 2019-04-28 PROCEDURE — 85027 COMPLETE CBC AUTOMATED: CPT

## 2019-04-28 PROCEDURE — 87581 M.PNEUMON DNA AMP PROBE: CPT

## 2019-04-28 PROCEDURE — 83880 ASSAY OF NATRIURETIC PEPTIDE: CPT

## 2019-04-28 PROCEDURE — 80048 BASIC METABOLIC PNL TOTAL CA: CPT

## 2019-04-28 PROCEDURE — 82803 BLOOD GASES ANY COMBINATION: CPT

## 2019-04-28 PROCEDURE — 97116 GAIT TRAINING THERAPY: CPT

## 2019-04-28 PROCEDURE — 82962 GLUCOSE BLOOD TEST: CPT

## 2019-04-28 PROCEDURE — 87633 RESP VIRUS 12-25 TARGETS: CPT

## 2019-04-28 PROCEDURE — 83735 ASSAY OF MAGNESIUM: CPT

## 2019-04-28 PROCEDURE — 99221 1ST HOSP IP/OBS SF/LOW 40: CPT

## 2019-04-28 RX ORDER — METOPROLOL TARTRATE 50 MG
1 TABLET ORAL
Qty: 60 | Refills: 0
Start: 2019-04-28

## 2019-04-28 RX ORDER — AZITHROMYCIN 500 MG/1
1 TABLET, FILM COATED ORAL
Qty: 2 | Refills: 0
Start: 2019-04-28

## 2019-04-28 RX ORDER — METOPROLOL TARTRATE 50 MG
25 TABLET ORAL
Qty: 0 | Refills: 0 | Status: DISCONTINUED | OUTPATIENT
Start: 2019-04-28 | End: 2019-04-28

## 2019-04-28 RX ADMIN — Medication 40 MILLIGRAM(S): at 05:24

## 2019-04-28 RX ADMIN — Medication 3 MILLILITER(S): at 01:58

## 2019-04-28 RX ADMIN — CLOPIDOGREL BISULFATE 75 MILLIGRAM(S): 75 TABLET, FILM COATED ORAL at 11:26

## 2019-04-28 RX ADMIN — Medication 8: at 00:15

## 2019-04-28 RX ADMIN — AZITHROMYCIN 500 MILLIGRAM(S): 500 TABLET, FILM COATED ORAL at 11:26

## 2019-04-28 RX ADMIN — Medication 3 MILLILITER(S): at 07:58

## 2019-04-28 RX ADMIN — BUDESONIDE AND FORMOTEROL FUMARATE DIHYDRATE 2 PUFF(S): 160; 4.5 AEROSOL RESPIRATORY (INHALATION) at 08:29

## 2019-04-28 RX ADMIN — Medication 6: at 12:15

## 2019-04-28 RX ADMIN — Medication 3 MILLILITER(S): at 13:26

## 2019-04-28 RX ADMIN — Medication 2: at 08:29

## 2019-04-28 RX ADMIN — ENOXAPARIN SODIUM 40 MILLIGRAM(S): 100 INJECTION SUBCUTANEOUS at 05:24

## 2019-04-28 NOTE — DISCHARGE NOTE NURSING/CASE MANAGEMENT/SOCIAL WORK - [email or web address]_GEN_ALL_CORE
You can access the [facility] Portal, offered by [facility], by registering with the following website: [email or web address]

## 2019-04-28 NOTE — PROGRESS NOTE ADULT - SUBJECTIVE AND OBJECTIVE BOX
[facility] -- Saud Aguilar, Génesis Louise, Carroll Haney Savella  Office # [phone]    Follow Up:  Tachycardia    Subjective/Observations: Seen and evaluated, nasal cannula in use.  Patient is on home O2 prn.  Denies any respiratory or cardiac discomfort.  Admits to have ambulated in the hallway with a walker on nasal cannula.  Patient was asymptomatic    REVIEW OF SYSTEMS: All other review of systems is negative unless indicated above    PAST MEDICAL & SURGICAL HISTORY:  PVD (peripheral vascular disease)  Hypertension  COPD (chronic obstructive pulmonary disease)  Osteomyelitis  Dyslipidemia  CAD (Coronary Artery Disease)  Diabetes Mellitus, Type II  Compound fracture: left leg  CABG (Coronary Artery Bypass Graft)    MEDICATIONS  (STANDING):  ALBUTerol/ipratropium for Nebulization 3 milliLiter(s) Nebulizer every 6 hours  atorvastatin 40 milliGRAM(s) Oral at bedtime  azithromycin   Tablet 500 milliGRAM(s) Oral every 24 hours  buDESOnide 160 MICROgram(s)/formoterol 4.5 MICROgram(s) Inhaler 2 Puff(s) Inhalation two times a day  clopidogrel Tablet 75 milliGRAM(s) Oral daily  enoxaparin Injectable 40 milliGRAM(s) SubCutaneous every 24 hours  insulin lispro (HumaLOG) corrective regimen sliding scale   SubCutaneous Before meals and at bedtime  methylPREDNISolone sodium succinate Injectable 40 milliGRAM(s) IV Push every 12 hours  metoprolol tartrate 12.5 milliGRAM(s) Oral two times a day  tamsulosin 0.4 milliGRAM(s) Oral at bedtime  valsartan 80 milliGRAM(s) Oral daily    MEDICATIONS  (PRN):  ALBUTerol    0.083% 2.5 milliGRAM(s) Nebulizer every 2 hours PRN Shortness of Breath and/or Wheezing    Allergies    No Known Allergies    Intolerances    shellfish (Nausea)    Vital Signs Last 24 Hrs  T(C): 36.6 (28 Apr 2019 08:03), Max: 37.2 (27 Apr 2019 19:23)  T(F): 97.8 (28 Apr 2019 08:03), Max: 99 (27 Apr 2019 19:23)  HR: 120 (28 Apr 2019 09:55) (69 - 120)  BP: 122/71 (28 Apr 2019 09:35) (106/52 - 122/71)  BP(mean): --  RR: 18 (28 Apr 2019 08:03) (18 - 18)  SpO2: 95% (28 Apr 2019 09:55) (95% - 100%)    I&O's Summary    27 Apr 2019 07:01  -  28 Apr 2019 07:00  --------------------------------------------------------  IN: 150 mL / OUT: 1850 mL / NET: -1700 mL    PHYSICAL EXAM:  TELE: NSR  Constitutional: NAD, awake and alert, obese  HEENT: Moist Mucous Membranes, Anicteric  Pulmonary: Non-labored, breath sounds are diminished bilaterally, No wheezing, rales or rhonchi  Cardiovascular: Regular, S1 and S2, No murmurs, rubs, gallops or clicks  Gastrointestinal: Bowel Sounds present, soft, nontender.   Lymph: No peripheral edema. No lymphadenopathy.  Skin: No visible rashes or ulcers.  Psych:  Mood & affect appropriate    LABS: All Labs Reviewed:                        12.0   9.84  )-----------( 269      ( 28 Apr 2019 09:47 )             39.2                         11.8   16.54 )-----------( 298      ( 27 Apr 2019 07:53 )             38.3                         12.2   7.57  )-----------( 263      ( 26 Apr 2019 05:27 )             39.3     28 Apr 2019 09:47    139    |  99     |  20     ----------------------------<  317    4.7     |  36     |  1.10   27 Apr 2019 07:53    141    |  102    |  20     ----------------------------<  190    4.7     |  35     |  1.20   26 Apr 2019 05:27    141    |  103    |  13     ----------------------------<  195    5.0     |  33     |  1.10     Ca    9.1        28 Apr 2019 09:47  Ca    9.3        27 Apr 2019 07:53  Ca    9.5        26 Apr 2019 05:27  Phos  3.6       27 Apr 2019 07:53  Phos  2.4       26 Apr 2019 05:27  Phos  3.0       25 Apr 2019 16:22  Mg     2.1       28 Apr 2019 09:47  Mg     2.1       27 Apr 2019 07:53  Mg     1.9       26 Apr 2019 05:27    TPro  6.0    /  Alb  3.2    /  TBili  0.3    /  DBili  x      /  AST  7      /  ALT  19     /  AlkPhos  78     27 Apr 2019 07:53  TPro  6.2    /  Alb  3.3    /  TBili  0.4    /  DBili  x      /  AST  10     /  ALT  23     /  AlkPhos  78     26 Apr 2019 05:27  TPro  6.6    /  Alb  3.5    /  TBili  0.4    /  DBili  x      /  AST  13     /  ALT  23     /  AlkPhos  90     25 Apr 2019 16:22    < from: TTE Echo Doppler w/o Cont (03.12.19 @ 20:42) >     EXAM:  ECHO TTE WO CON COMP W DOPPLR         PROCEDURE DATE:  03/12/2019        INTERPRETATION:  Ordering Physician: MARTÍN CARRASQUILLO [id number]    Indication: Shortness of breath    Study Quality: Technically difficult   A complete echocardiographic study was performed utilizing standard   protocol including spectral and color Doppler in all echocardiographic   windows.    Height: 175 cm  Weight: 113 kg  BSA: 2.2 sq m  Blood Pressure: 103/61    MEASUREMENTS  IVS: 1.5cm  PWT: 1.1cm  LA: 3.1cm  AO: 3.3cm  LVIDd: 4.2cm  LVIDs: 3.3cm    RVSP: 29mmHg    FINDINGS  Left Ventricle: The endocardium is not well-visualized. In limited views,   the LV function appears to be preserved with an estimated EF of 55%.   There is paradoxical motion of the septum in the setting of prior cardiac   surgery.  Aortic Valve: Calcified aortic valve with normal opening. Trace aortic   regurgitation  Mitral Valve: Thickened and calcified mitral valve leaflets with trace to   mild mitral regurgitation  Tricuspid Valve: Grossly normal tricuspid valve. Mild tricuspid   regurgitation.  Pulmonic Valve: Not well-visualized.  Left Atrium: Grossly normal. An interatrial septal aneurysm is noted   without obvious color flow across it  Right Ventricle: In limited views, grossly normal RV size.  Right Atrium: Grossly normal  Diastolic Function: Doppler evidence of rate 1 diastolic dysfunction  Pericardium/Pleura: No significant pericardial effusion.  Hemodynamics: The pulmonary artery systolic pressure is estimated to be   29 mmHg, assuming the right atrial pressure is equal to 8 mmHg,   consistent with normal pulmonary pressures.    CONCLUSIONS:  1. Technically difficult and limited study  2. The endocardium is not well-visualized. In limited views, the LV   function appears to be preserved with an estimated EF of 55%. There is   paradoxical motion of the septum in the setting of prior cardiac surgery.  3. Calcified aortic valve with normal opening. Trace aortic regurgitation  4. Thickened and calcified mitral valve leaflets with trace to mild   mitral regurgitation  5. Doppler evidence of rate 1 diastolic dysfunction  6. The interatrial septum appears aneurysmal  7. No significant pericardial effusion.    REJI VILA   This document has been electronically signed. Mar 14 2019 10:16AM     < end of copied text >    < from: Xray Chest 1 View- PORTABLE-Urgent (04.25.19 @ 11:52) >    EXAM:  XR CHEST PORTABLE URGENT 1V                          PROCEDURE DATE:  04/25/2019      INTERPRETATION:  AP semierect chest on April 25, 2019 11:36 AM. Patient   is short of breath. 2 views obtained.    COPD configuration to the chest again noted. Sternotomy again seen. Heart   remains normal in size.    On April 4 this year there was a minimal left base pleural reaction. This   is again noted and suggest chronic etiology.    IMPRESSION: Unchanged chest as above.    REJI ROTHMAN M.D., ATTENDING RADIOLOGIST  This document has been electronically signed. Apr 25 2019 11:54AM      < end of copied text > [facility] -- Saud Aguilar, Dani, Medina Capps Patel, Savella  Office # [phone]    Follow Up:  Tachycardia    Subjective/Observations: Seen and evaluated, nasal cannula in use.  Patient is on home O2 prn.  Denies any respiratory or cardiac discomfort.  Admits to have ambulated in the hallway with a walker on nasal cannula.  Patient was asymptomatic, though, tachycardia was noted on flow sheet.    REVIEW OF SYSTEMS: All other review of systems is negative unless indicated above    PAST MEDICAL & SURGICAL HISTORY:  PVD (peripheral vascular disease)  Hypertension  COPD (chronic obstructive pulmonary disease)  Osteomyelitis  Dyslipidemia  CAD (Coronary Artery Disease)  Diabetes Mellitus, Type II  Compound fracture: left leg  CABG (Coronary Artery Bypass Graft)    MEDICATIONS  (STANDING):  ALBUTerol/ipratropium for Nebulization 3 milliLiter(s) Nebulizer every 6 hours  atorvastatin 40 milliGRAM(s) Oral at bedtime  azithromycin   Tablet 500 milliGRAM(s) Oral every 24 hours  buDESOnide 160 MICROgram(s)/formoterol 4.5 MICROgram(s) Inhaler 2 Puff(s) Inhalation two times a day  clopidogrel Tablet 75 milliGRAM(s) Oral daily  enoxaparin Injectable 40 milliGRAM(s) SubCutaneous every 24 hours  insulin lispro (HumaLOG) corrective regimen sliding scale   SubCutaneous Before meals and at bedtime  methylPREDNISolone sodium succinate Injectable 40 milliGRAM(s) IV Push every 12 hours  metoprolol tartrate 12.5 milliGRAM(s) Oral two times a day  tamsulosin 0.4 milliGRAM(s) Oral at bedtime  valsartan 80 milliGRAM(s) Oral daily    MEDICATIONS  (PRN):  ALBUTerol    0.083% 2.5 milliGRAM(s) Nebulizer every 2 hours PRN Shortness of Breath and/or Wheezing    Allergies    No Known Allergies    Intolerances    shellfish (Nausea)    Vital Signs Last 24 Hrs  T(C): 36.6 (28 Apr 2019 08:03), Max: 37.2 (27 Apr 2019 19:23)  T(F): 97.8 (28 Apr 2019 08:03), Max: 99 (27 Apr 2019 19:23)  HR: 120 (28 Apr 2019 09:55) (69 - 120)  BP: 122/71 (28 Apr 2019 09:35) (106/52 - 122/71)  BP(mean): --  RR: 18 (28 Apr 2019 08:03) (18 - 18)  SpO2: 95% (28 Apr 2019 09:55) (95% - 100%)    I&O's Summary    27 Apr 2019 07:01  -  28 Apr 2019 07:00  --------------------------------------------------------  IN: 150 mL / OUT: 1850 mL / NET: -1700 mL    PHYSICAL EXAM:  TELE: NSR  Constitutional: NAD, awake and alert, obese  HEENT: Moist Mucous Membranes, Anicteric  Pulmonary: Non-labored, breath sounds are diminished bilaterally, No wheezing, rales or rhonchi  Cardiovascular: Regular, S1 and S2, No murmurs, rubs, gallops or clicks  Gastrointestinal: Bowel Sounds present, soft, nontender.   Lymph: No peripheral edema. No lymphadenopathy.  Skin: No visible rashes or ulcers.  Psych:  Mood & affect appropriate    LABS: All Labs Reviewed:                        12.0   9.84  )-----------( 269      ( 28 Apr 2019 09:47 )             39.2                         11.8   16.54 )-----------( 298      ( 27 Apr 2019 07:53 )             38.3                         12.2   7.57  )-----------( 263      ( 26 Apr 2019 05:27 )             39.3     28 Apr 2019 09:47    139    |  99     |  20     ----------------------------<  317    4.7     |  36     |  1.10   27 Apr 2019 07:53    141    |  102    |  20     ----------------------------<  190    4.7     |  35     |  1.20   26 Apr 2019 05:27    141    |  103    |  13     ----------------------------<  195    5.0     |  33     |  1.10     Ca    9.1        28 Apr 2019 09:47  Ca    9.3        27 Apr 2019 07:53  Ca    9.5        26 Apr 2019 05:27  Phos  3.6       27 Apr 2019 07:53  Phos  2.4       26 Apr 2019 05:27  Phos  3.0       25 Apr 2019 16:22  Mg     2.1       28 Apr 2019 09:47  Mg     2.1       27 Apr 2019 07:53  Mg     1.9       26 Apr 2019 05:27    TPro  6.0    /  Alb  3.2    /  TBili  0.3    /  DBili  x      /  AST  7      /  ALT  19     /  AlkPhos  78     27 Apr 2019 07:53  TPro  6.2    /  Alb  3.3    /  TBili  0.4    /  DBili  x      /  AST  10     /  ALT  23     /  AlkPhos  78     26 Apr 2019 05:27  TPro  6.6    /  Alb  3.5    /  TBili  0.4    /  DBili  x      /  AST  13     /  ALT  23     /  AlkPhos  90     25 Apr 2019 16:22    < from: TTE Echo Doppler w/o Cont (03.12.19 @ 20:42) >     EXAM:  ECHO TTE WO CON COMP W DOPPLR         PROCEDURE DATE:  03/12/2019        INTERPRETATION:  Ordering Physician: MARTÍN CARRASQUILLO [id number]    Indication: Shortness of breath    Study Quality: Technically difficult   A complete echocardiographic study was performed utilizing standard   protocol including spectral and color Doppler in all echocardiographic   windows.    Height: 175 cm  Weight: 113 kg  BSA: 2.2 sq m  Blood Pressure: 103/61    MEASUREMENTS  IVS: 1.5cm  PWT: 1.1cm  LA: 3.1cm  AO: 3.3cm  LVIDd: 4.2cm  LVIDs: 3.3cm    RVSP: 29mmHg    FINDINGS  Left Ventricle: The endocardium is not well-visualized. In limited views,   the LV function appears to be preserved with an estimated EF of 55%.   There is paradoxical motion of the septum in the setting of prior cardiac   surgery.  Aortic Valve: Calcified aortic valve with normal opening. Trace aortic   regurgitation  Mitral Valve: Thickened and calcified mitral valve leaflets with trace to   mild mitral regurgitation  Tricuspid Valve: Grossly normal tricuspid valve. Mild tricuspid   regurgitation.  Pulmonic Valve: Not well-visualized.  Left Atrium: Grossly normal. An interatrial septal aneurysm is noted   without obvious color flow across it  Right Ventricle: In limited views, grossly normal RV size.  Right Atrium: Grossly normal  Diastolic Function: Doppler evidence of rate 1 diastolic dysfunction  Pericardium/Pleura: No significant pericardial effusion.  Hemodynamics: The pulmonary artery systolic pressure is estimated to be   29 mmHg, assuming the right atrial pressure is equal to 8 mmHg,   consistent with normal pulmonary pressures.    CONCLUSIONS:  1. Technically difficult and limited study  2. The endocardium is not well-visualized. In limited views, the LV   function appears to be preserved with an estimated EF of 55%. There is   paradoxical motion of the septum in the setting of prior cardiac surgery.  3. Calcified aortic valve with normal opening. Trace aortic regurgitation  4. Thickened and calcified mitral valve leaflets with trace to mild   mitral regurgitation  5. Doppler evidence of rate 1 diastolic dysfunction  6. The interatrial septum appears aneurysmal  7. No significant pericardial effusion.    REJI VILA   This document has been electronically signed. Mar 14 2019 10:16AM     < end of copied text >    < from: Xray Chest 1 View- PORTABLE-Urgent (04.25.19 @ 11:52) >    EXAM:  XR CHEST PORTABLE URGENT 1V                          PROCEDURE DATE:  04/25/2019      INTERPRETATION:  AP semierect chest on April 25, 2019 11:36 AM. Patient   is short of breath. 2 views obtained.    COPD configuration to the chest again noted. Sternotomy again seen. Heart   remains normal in size.    On April 4 this year there was a minimal left base pleural reaction. This   is again noted and suggest chronic etiology.    IMPRESSION: Unchanged chest as above.    REJI ROTHMNA M.D., ATTENDING RADIOLOGIST  This document has been electronically signed. Apr 25 2019 11:54AM      < end of copied text >

## 2019-04-28 NOTE — PROGRESS NOTE ADULT - ATTENDING COMMENTS
Seen/examined. agree with above.
I personally saw and examined the patient in detail.  I have spoken to the above provider regarding the assessment and plan of care.  I reviewed the above assessment and plan of care, and agree.  I have made changes in the body of the note where appropriate.
**late entry.  Pt seen and examined 4/26**    77 yo M with Hx CAD, CABG, dCHF, asthma and COPD, chronic hypoxemic respiratory failure, prior intubation for status asthmaticus, now presenting with dyspnea and hypercapnic respiratory failure, with asthma exacerbation.  Today is markedly improved.      --asthma exacerbation with acute on chronic hypercapnic respiratory failure  now tolerating NC  decrease steroids to solumedrol 40 q8h  continue bronchodilators and symbicort  empiric azithromycin pending Cx data  pt with frequent exacerbations requiring hospitalization.  Need to ensure proper inhaler technique.  Asked wife to bring in inhalers so he can receive education regarding technique.  May require escalation of maintenance regimen.  --CAD, hemodynamically stable   continue home regimen of plavix, statin, lopressor, valsartan   --tolerating PO diet  --CKD at baseline  --DM2, fs elevated on moderate insulin coverage scale but are weaning steroids today  monitor for now  --plan discussed with pt and wife .   --stable for tele

## 2019-04-28 NOTE — DISCHARGE NOTE PROVIDER - HOSPITAL COURSE
78 y Male with a PMHx of HTN, COPD (On home O2 2L and BIPAP at Nighttime), CAD w/ 3v CABG in '04, HLD, DM2 (on Metformin/Glipizide), hx Hypercapnic Resp Failure/Cardiac Arrest requiring intubation (6/'18), Presents to the ED for increased SOB for the past 2 days. Pt was recently admitted to [facility] on 4/4/19 for SOB/Resp Failure, treated with abx/Steroid therapy and BIPAP. Pt was Discharged from the Hospital on 4/7/19 and recommended to FU with Primary care physician and cardiologist. Pt and Wife at bedside report he has an appointment with his PCP this coming Monday. Per Wife, Pt was brought to the ED today by EMS for persistant worsening SOB, s/p 3-4 nebulizer treatments overnight along with BIPAP. Pt reports of having sob/trouble breathing at baseline, however usually BIPAP, along with nebulized treatments have helped with his symptoms. Due to his worsening SOB for the past 2 days, both patient and wife decided he needed to go to nearest ED. Pt Denies any recent sick contacts. Pt denies Fever, Night Sweats, Chills, n/v. Pt reports of SOB w/ trouble breathing and wheezing. Endorses of a cough, however non productive. No other complaints at this time.         In ED    Upon Evaluation: Vitals /68, , RR 28, O2sat 100%, Patient On BiPAP, Had an episode Of SVTs in the 180's, where Carotid Message was performed with resolution to HR of 110's.     -Pt evaluated by the critical care team where vitals demonstrated: T 98  Afib  then came down to 107 after cardizem 10 mg IV  BP 91/43 then 128/76 RR 18 O2 sat 97 on RA    Labs significant for serum CO2 34, glucose 170    Most recent ABG pH 7.24 pCO2 70 pO2 87 HCO3 25 Base excess 2.7     CXR No focal consolidation or pleural effusion, Flattened Diaphragm    Viral Panel/Flu: Negative    BCx/UCx/Sputum Cx ordered     12 lead EKG shows Sinus Tachycardia     Received: Azithromycin x1 dose, albuterol x1, duonebs x1, budesonide x1, cardizem 10 mg IV x1, Magnesium sulfate 2 g IV x1, solumedrol 125 mg IV x1        Pt. was was admitted to ICU for hypercapnic respiratory failure and placed on BiPAP.  He was initiated on IV steroids, nebulizer tx, and antibiotic.  He had Cardiology consult for evaluation of tachycardia.  He had episode of SVT in ED that responded to carotid message and IV diltiazem.  This was believed to be precipitated from his respiratory failure.  Serial troponin were negative.  Blood culture showed no growth.  Pt. clinically improved and was down graded from ICU.  His steroids were tapered and patient continued to improve.          On day of discharge patient is feeling well and denies any SOB.  He has remained afebrile and hemodynamically stable.   Pt. denies actively smoking.  Reports he quit 30 years ago.         Completion of discharge in 35 minutes.

## 2019-04-28 NOTE — PROGRESS NOTE ADULT - SUBJECTIVE AND OBJECTIVE BOX
CHIEF COMPLAINT/INTERVAL HISTORY:  Pt. seen and evaluated for acute hypercapnic respiratory failure and acute COPD exacerbation.  Pt. is in no distress.  Feels well.  Denies any wheezing or SOB.  Tolerating IV steroids.     REVIEW OF SYSTEMS:  No fever, CP, sOB, or abdominal pain.     Vital Signs Last 24 Hrs  T(C): 36.6 (28 Apr 2019 08:03), Max: 37.2 (27 Apr 2019 19:23)  T(F): 97.8 (28 Apr 2019 08:03), Max: 99 (27 Apr 2019 19:23)  HR: 82 (28 Apr 2019 08:03) (69 - 104)  BP: 115/72 (28 Apr 2019 08:03) (106/52 - 115/72)  BP(mean): --  RR: 18 (28 Apr 2019 08:03) (18 - 18)  SpO2: 100% (28 Apr 2019 08:03) (95% - 100%)    PHYSICAL EXAM:  GENERAL: NAD  HEENT: EOMI, hearing normal, conjunctiva and sclera clear  Chest: CTA bilaterally, no wheezing  CV: S1S2, RRR,   GI: soft, +BS, NT/ND  Musculoskeletal: no edema  Psychiatric: affect nL, mood nL  Skin: warm and dry    LABS:                        11.8   16.54 )-----------( 298      ( 27 Apr 2019 07:53 )             38.3     04-27    141  |  102  |  20  ----------------------------<  190<H>  4.7   |  35<H>  |  1.20    Ca    9.3      27 Apr 2019 07:53  Phos  3.6     04-27  Mg     2.1     04-27    TPro  6.0  /  Alb  3.2<L>  /  TBili  0.3  /  DBili  x   /  AST  7<L>  /  ALT  19  /  AlkPhos  78  04-27          Assessment and Plan:  -Acute hypercapnic respiratory failure 2/2 acute copd exacerbation:  Clinically improved and down graded from ICU.  Continue solu-medrol to 40mg IV Q12h (will switch to prednisone taper upon discharge), Azithromycin 500mg PO daily, Duoneb tx Q6h, Symbicort inh BID, and O2 support.  Pulmonary f/u  -CAD:  continue Plavix 75mg PO daily, metoprolol 12.5mg PO BID, and Lipitor 40mg PO QHS  -PVD:  continue Plavix and statin  -HTN:  continue metoprolol 12.5mg PO BID and Diovan 80mg PO daily  -HLD:  continue statin therapy  -Type 2 DM:  continue humalog sliding scale  -BPH:  continue Flomax 0.4mg PO QHS  -VTE ppx:  Lovenox 40mg SQ daily

## 2019-04-28 NOTE — PROGRESS NOTE ADULT - ASSESSMENT
78 yr old male with stated Hx significant for COPD, Asthma on home O2/Bipap therapy, CAD, CABG '04, PVD s/p stents (remote), frequent hospitalizations for COPD/Asthma exacerbations with most recent beginning of April 2019 who now presents with progressive sob/wheezes over past 24 hrs without relief from albuterol neb tx and bipap therapy. Found to be in hypercapnic resp failure secondary to COPD exacerbation vs asthma exacerbation Consulted for tachycardia     - there is no evidence of acute ischemia.  CE negative x2  - ECG ST @ 103 w/o ischemic changes  - Tachycardia likely reactive, now resolved  - Continue low dose BB  - Discontinue telemetry    - Continue nocturnal BIPAP for respiratory support for acute hypercapnic respiratory failure  - Supplemental O2 as needed    - there is no evidence of significant arrhythmia.  - CW home dose metropolol, plavix, statin, valsartan  - Reportedly had SVT in ED that responded to carotid massage and IV dilt.  This was likely reactive as well.  No strips available for review    - There is no evidence for meaningful  volume overload.  - , chest xray unchaged from 4/4 showing minimal L pleural reaction chronic in nature  - TTE from 3/12/19  shows EF 55% w/ mild MR and trace Ar w/ Grade 1 DD no need to repeat at this time    - Monitor and replete lytes, keep K>4 and Mg >2  - Further cardiac workup will depend on clinical course.   - All other workup per primary team  - DVT ppx    Plan for discharge in am.  He follows up with his Private Cardio, Dr. Rod  Will continue to follow while inpatient    Dena Maynard NP  Cardiology 78 yr old male with stated Hx significant for COPD, Asthma on home O2/Bipap therapy, CAD, CABG '04, PVD s/p stents (remote), frequent hospitalizations for COPD/Asthma exacerbations with most recent beginning of April 2019 who now presents with progressive sob/wheezes over past 24 hrs without relief from albuterol neb tx and bipap therapy. Found to be in hypercapnic resp failure secondary to COPD exacerbation vs asthma exacerbation Consulted for tachycardia     - there is no evidence of acute ischemia.  CE negative x2  - ECG ST @ 103 w/o ischemic changes  - Tachycardia likely reactive, now resolved  - Increase BB for rate-control  - Discontinue telemetry    - Continue nocturnal BIPAP for respiratory support for acute hypercapnic respiratory failure  - Supplemental O2 as needed    - there is no evidence of significant arrhythmia.  - CW home dose metropolol, plavix, statin, valsartan  - Reportedly had SVT in ED that responded to carotid massage and IV dilt.  This was likely reactive as well.  No strips available for review    - There is no evidence for meaningful  volume overload.  - , chest xray unchaged from 4/4 showing minimal L pleural reaction chronic in nature  - TTE from 3/12/19  shows EF 55% w/ mild MR and trace Ar w/ Grade 1 DD no need to repeat at this time    - Monitor and replete lytes, keep K>4 and Mg >2  - Further cardiac workup will depend on clinical course.   - All other workup per primary team  - DVT ppx    Plan for discharge in am.  He follows up with his Private Cardio, Dr. Rod  Will continue to follow while inpatient    Dena Maynard NP  Cardiology

## 2019-04-28 NOTE — DISCHARGE NOTE PROVIDER - NSDCCPCAREPLAN_GEN_ALL_CORE_FT
PRINCIPAL DISCHARGE DIAGNOSIS  Diagnosis: Acute on chronic respiratory failure with hypercapnia  Assessment and Plan of Treatment: secondary to acute COPD exacerbation.  Complete course of prednisone taper and antibiotic.      SECONDARY DISCHARGE DIAGNOSES  Diagnosis: Chronic obstructive pulmonary disease, unspecified COPD type  Assessment and Plan of Treatment: Use 2 L nasal canula to maintain SpO2>92%.  Complete course of prednisone and antibiotic.    Diagnosis: Diabetes Mellitus, Type II  Assessment and Plan of Treatment: Continue your home diabetes medication    Diagnosis: CAD (Coronary Artery Disease)  Assessment and Plan of Treatment: continue your cardiac maintenance medications.

## 2019-04-28 NOTE — PROGRESS NOTE ADULT - SUBJECTIVE AND OBJECTIVE BOX
Date/Time Patient Seen:  		  Referring MD:   Data Reviewed	       Patient is a 78y old  Male who presents with a chief complaint of dyspnea (27 Apr 2019 10:34)      Subjective/HPI     PAST MEDICAL & SURGICAL HISTORY:  PVD (peripheral vascular disease)  Hypertension  Diabetes mellitus  COPD (chronic obstructive pulmonary disease)  Osteomyelitis  Asymptomatic Peripheral Vascular Disease  Dyslipidemia  CAD (Coronary Artery Disease)  Diabetes Mellitus, Type II  Compound fracture: left leg  CABG (Coronary Artery Bypass Graft)        Medication list         MEDICATIONS  (STANDING):  ALBUTerol/ipratropium for Nebulization 3 milliLiter(s) Nebulizer every 6 hours  atorvastatin 40 milliGRAM(s) Oral at bedtime  azithromycin   Tablet 500 milliGRAM(s) Oral every 24 hours  buDESOnide 160 MICROgram(s)/formoterol 4.5 MICROgram(s) Inhaler 2 Puff(s) Inhalation two times a day  clopidogrel Tablet 75 milliGRAM(s) Oral daily  enoxaparin Injectable 40 milliGRAM(s) SubCutaneous every 24 hours  insulin lispro (HumaLOG) corrective regimen sliding scale   SubCutaneous Before meals and at bedtime  methylPREDNISolone sodium succinate Injectable 40 milliGRAM(s) IV Push every 12 hours  metoprolol tartrate 12.5 milliGRAM(s) Oral two times a day  tamsulosin 0.4 milliGRAM(s) Oral at bedtime  valsartan 80 milliGRAM(s) Oral daily    MEDICATIONS  (PRN):  ALBUTerol    0.083% 2.5 milliGRAM(s) Nebulizer every 2 hours PRN Shortness of Breath and/or Wheezing         Vitals log        ICU Vital Signs Last 24 Hrs  T(C): 36.4 (28 Apr 2019 04:56), Max: 37.2 (27 Apr 2019 19:23)  T(F): 97.5 (28 Apr 2019 04:56), Max: 99 (27 Apr 2019 19:23)  HR: 69 (28 Apr 2019 04:56) (69 - 104)  BP: 106/52 (28 Apr 2019 05:20) (106/52 - 118/73)  BP(mean): --  ABP: --  ABP(mean): --  RR: 18 (28 Apr 2019 04:56) (18 - 18)  SpO2: 99% (28 Apr 2019 04:56) (95% - 100%)           Input and Output:  I&O's Detail    27 Apr 2019 07:01  -  28 Apr 2019 07:00  --------------------------------------------------------  IN:    Oral Fluid: 150 mL  Total IN: 150 mL    OUT:    Voided: 1850 mL  Total OUT: 1850 mL    Total NET: -1700 mL          Lab Data                        11.8   16.54 )-----------( 298      ( 27 Apr 2019 07:53 )             38.3     04-27    141  |  102  |  20  ----------------------------<  190<H>  4.7   |  35<H>  |  1.20    Ca    9.3      27 Apr 2019 07:53  Phos  3.6     04-27  Mg     2.1     04-27    TPro  6.0  /  Alb  3.2<L>  /  TBili  0.3  /  DBili  x   /  AST  7<L>  /  ALT  19  /  AlkPhos  78  04-27            Review of Systems	      Objective     Physical Examination    heart s1s2  lung dec BS      Pertinent Lab findings & Imaging      Eliecer:  NO   Adequate UO     I&O's Detail    27 Apr 2019 07:01  -  28 Apr 2019 07:00  --------------------------------------------------------  IN:    Oral Fluid: 150 mL  Total IN: 150 mL    OUT:    Voided: 1850 mL  Total OUT: 1850 mL    Total NET: -1700 mL               Discussed with:     Cultures:	        Radiology

## 2019-04-28 NOTE — PROGRESS NOTE ADULT - PROBLEM SELECTOR PLAN 1
doing much better on COPD regimen  on NIPPV night time and prn  awake  alert  verbal  bronchodilators and systemic steroids - adjusted on 4/27  I and O  cvs regimen  nutrition  out of bed   keep sat > 88 pct  will follow  prognosis guarded - multiple and frequent readmissions

## 2019-04-28 NOTE — DISCHARGE NOTE PROVIDER - CARE PROVIDER_API CALL
Arun Dejesus)  Critical Care Medicine; Internal Medicine; Pulmonary Disease  [address] [place] [address]  Phone: [phone]  Fax: [phone]  Follow Up Time:

## 2019-05-24 ENCOUNTER — INPATIENT (INPATIENT)
Facility: HOSPITAL | Age: 79
LOS: 2 days | Discharge: HOME | DRG: 189 | End: 2019-05-27
Attending: HOSPITALIST | Admitting: HOSPITALIST
Payer: COMMERCIAL

## 2019-05-24 VITALS
SYSTOLIC BLOOD PRESSURE: 148 MMHG | RESPIRATION RATE: 15 BRPM | OXYGEN SATURATION: 100 % | DIASTOLIC BLOOD PRESSURE: 85 MMHG | TEMPERATURE: 98 F | HEART RATE: 96 BPM | WEIGHT: 207.01 LBS | HEIGHT: 71 IN

## 2019-05-24 DIAGNOSIS — T14.8XXA OTHER INJURY OF UNSPECIFIED BODY REGION, INITIAL ENCOUNTER: Chronic | ICD-10-CM

## 2019-05-24 DIAGNOSIS — E78.5 HYPERLIPIDEMIA, UNSPECIFIED: ICD-10-CM

## 2019-05-24 DIAGNOSIS — Z29.9 ENCOUNTER FOR PROPHYLACTIC MEASURES, UNSPECIFIED: ICD-10-CM

## 2019-05-24 DIAGNOSIS — E11.9 TYPE 2 DIABETES MELLITUS WITHOUT COMPLICATIONS: ICD-10-CM

## 2019-05-24 DIAGNOSIS — J44.9 CHRONIC OBSTRUCTIVE PULMONARY DISEASE, UNSPECIFIED: ICD-10-CM

## 2019-05-24 DIAGNOSIS — I10 ESSENTIAL (PRIMARY) HYPERTENSION: ICD-10-CM

## 2019-05-24 DIAGNOSIS — I25.10 ATHEROSCLEROTIC HEART DISEASE OF NATIVE CORONARY ARTERY WITHOUT ANGINA PECTORIS: ICD-10-CM

## 2019-05-24 LAB
ALBUMIN SERPL ELPH-MCNC: 3.6 G/DL — SIGNIFICANT CHANGE UP (ref 3.3–5)
ALP SERPL-CCNC: 94 U/L — SIGNIFICANT CHANGE UP (ref 40–120)
ALT FLD-CCNC: 21 U/L — SIGNIFICANT CHANGE UP (ref 12–78)
ANION GAP SERPL CALC-SCNC: 6 MMOL/L — SIGNIFICANT CHANGE UP (ref 5–17)
AST SERPL-CCNC: 16 U/L — SIGNIFICANT CHANGE UP (ref 15–37)
BASE EXCESS BLDA CALC-SCNC: 4.2 MMOL/L — HIGH (ref -2–2)
BASE EXCESS BLDA CALC-SCNC: 4.9 MMOL/L — HIGH (ref -2–2)
BASOPHILS # BLD AUTO: 0.04 K/UL — SIGNIFICANT CHANGE UP (ref 0–0.2)
BASOPHILS NFR BLD AUTO: 0.5 % — SIGNIFICANT CHANGE UP (ref 0–2)
BILIRUB SERPL-MCNC: 0.3 MG/DL — SIGNIFICANT CHANGE UP (ref 0.2–1.2)
BLOOD GAS COMMENTS ARTERIAL: SIGNIFICANT CHANGE UP
BLOOD GAS COMMENTS ARTERIAL: SIGNIFICANT CHANGE UP
BUN SERPL-MCNC: 9 MG/DL — SIGNIFICANT CHANGE UP (ref 7–23)
CALCIUM SERPL-MCNC: 9.1 MG/DL — SIGNIFICANT CHANGE UP (ref 8.5–10.1)
CHLORIDE SERPL-SCNC: 105 MMOL/L — SIGNIFICANT CHANGE UP (ref 96–108)
CO2 SERPL-SCNC: 32 MMOL/L — HIGH (ref 22–31)
CREAT SERPL-MCNC: 1 MG/DL — SIGNIFICANT CHANGE UP (ref 0.5–1.3)
EOSINOPHIL # BLD AUTO: 0.84 K/UL — HIGH (ref 0–0.5)
EOSINOPHIL NFR BLD AUTO: 11.3 % — HIGH (ref 0–6)
FLU A RESULT: SIGNIFICANT CHANGE UP
FLU A RESULT: SIGNIFICANT CHANGE UP
FLUAV AG NPH QL: SIGNIFICANT CHANGE UP
FLUBV AG NPH QL: SIGNIFICANT CHANGE UP
GLUCOSE SERPL-MCNC: 118 MG/DL — HIGH (ref 70–99)
HCO3 BLDA-SCNC: 27 MMOL/L — SIGNIFICANT CHANGE UP (ref 23–27)
HCO3 BLDA-SCNC: 27 MMOL/L — SIGNIFICANT CHANGE UP (ref 23–27)
HCT VFR BLD CALC: 40.1 % — SIGNIFICANT CHANGE UP (ref 39–50)
HGB BLD-MCNC: 12.3 G/DL — LOW (ref 13–17)
HOROWITZ INDEX BLDA+IHG-RTO: 25 — SIGNIFICANT CHANGE UP
HOROWITZ INDEX BLDA+IHG-RTO: 30 — SIGNIFICANT CHANGE UP
IMM GRANULOCYTES NFR BLD AUTO: 0.3 % — SIGNIFICANT CHANGE UP (ref 0–1.5)
LYMPHOCYTES # BLD AUTO: 1.19 K/UL — SIGNIFICANT CHANGE UP (ref 1–3.3)
LYMPHOCYTES # BLD AUTO: 16 % — SIGNIFICANT CHANGE UP (ref 13–44)
MCHC RBC-ENTMCNC: 27.8 PG — SIGNIFICANT CHANGE UP (ref 27–34)
MCHC RBC-ENTMCNC: 30.7 GM/DL — LOW (ref 32–36)
MCV RBC AUTO: 90.7 FL — SIGNIFICANT CHANGE UP (ref 80–100)
MONOCYTES # BLD AUTO: 0.75 K/UL — SIGNIFICANT CHANGE UP (ref 0–0.9)
MONOCYTES NFR BLD AUTO: 10.1 % — SIGNIFICANT CHANGE UP (ref 2–14)
NEUTROPHILS # BLD AUTO: 4.61 K/UL — SIGNIFICANT CHANGE UP (ref 1.8–7.4)
NEUTROPHILS NFR BLD AUTO: 61.8 % — SIGNIFICANT CHANGE UP (ref 43–77)
NRBC # BLD: 0 /100 WBCS — SIGNIFICANT CHANGE UP (ref 0–0)
PCO2 BLDA: 62 MMHG — HIGH (ref 32–46)
PCO2 BLDA: 74 MMHG — CRITICAL HIGH (ref 32–46)
PH BLDA: 7.26 — LOW (ref 7.35–7.45)
PH BLDA: 7.31 — LOW (ref 7.35–7.45)
PLATELET # BLD AUTO: 282 K/UL — SIGNIFICANT CHANGE UP (ref 150–400)
PO2 BLDA: 87 MMHG — SIGNIFICANT CHANGE UP (ref 74–108)
PO2 BLDA: 92 MMHG — SIGNIFICANT CHANGE UP (ref 74–108)
POTASSIUM SERPL-MCNC: 4.5 MMOL/L — SIGNIFICANT CHANGE UP (ref 3.5–5.3)
POTASSIUM SERPL-SCNC: 4.5 MMOL/L — SIGNIFICANT CHANGE UP (ref 3.5–5.3)
PROT SERPL-MCNC: 6.6 G/DL — SIGNIFICANT CHANGE UP (ref 6–8.3)
RBC # BLD: 4.42 M/UL — SIGNIFICANT CHANGE UP (ref 4.2–5.8)
RBC # FLD: 12.7 % — SIGNIFICANT CHANGE UP (ref 10.3–14.5)
RSV RESULT: SIGNIFICANT CHANGE UP
RSV RNA RESP QL NAA+PROBE: SIGNIFICANT CHANGE UP
SAO2 % BLDA: 96 % — SIGNIFICANT CHANGE UP (ref 92–96)
SAO2 % BLDA: 96 % — SIGNIFICANT CHANGE UP (ref 92–96)
SODIUM SERPL-SCNC: 143 MMOL/L — SIGNIFICANT CHANGE UP (ref 135–145)
TROPONIN I SERPL-MCNC: <.015 NG/ML — SIGNIFICANT CHANGE UP (ref 0.01–0.04)
WBC # BLD: 7.45 K/UL — SIGNIFICANT CHANGE UP (ref 3.8–10.5)
WBC # FLD AUTO: 7.45 K/UL — SIGNIFICANT CHANGE UP (ref 3.8–10.5)

## 2019-05-24 PROCEDURE — 93010 ELECTROCARDIOGRAM REPORT: CPT

## 2019-05-24 PROCEDURE — 71045 X-RAY EXAM CHEST 1 VIEW: CPT | Mod: 26

## 2019-05-24 PROCEDURE — 93970 EXTREMITY STUDY: CPT | Mod: 26

## 2019-05-24 PROCEDURE — 99291 CRITICAL CARE FIRST HOUR: CPT

## 2019-05-24 PROCEDURE — 99223 1ST HOSP IP/OBS HIGH 75: CPT | Mod: AI,GC

## 2019-05-24 RX ORDER — DEXTROSE 50 % IN WATER 50 %
12.5 SYRINGE (ML) INTRAVENOUS ONCE
Refills: 0 | Status: DISCONTINUED | OUTPATIENT
Start: 2019-05-24 | End: 2019-05-27

## 2019-05-24 RX ORDER — METOPROLOL TARTRATE 50 MG
25 TABLET ORAL
Refills: 0 | Status: DISCONTINUED | OUTPATIENT
Start: 2019-05-24 | End: 2019-05-27

## 2019-05-24 RX ORDER — IPRATROPIUM/ALBUTEROL SULFATE 18-103MCG
3 AEROSOL WITH ADAPTER (GRAM) INHALATION ONCE
Refills: 0 | Status: COMPLETED | OUTPATIENT
Start: 2019-05-24 | End: 2019-05-24

## 2019-05-24 RX ORDER — AZITHROMYCIN 500 MG/1
500 TABLET, FILM COATED ORAL EVERY 24 HOURS
Refills: 0 | Status: DISCONTINUED | OUTPATIENT
Start: 2019-05-25 | End: 2019-05-25

## 2019-05-24 RX ORDER — DEXTROSE 50 % IN WATER 50 %
25 SYRINGE (ML) INTRAVENOUS ONCE
Refills: 0 | Status: DISCONTINUED | OUTPATIENT
Start: 2019-05-24 | End: 2019-05-27

## 2019-05-24 RX ORDER — INSULIN LISPRO 100/ML
VIAL (ML) SUBCUTANEOUS AT BEDTIME
Refills: 0 | Status: DISCONTINUED | OUTPATIENT
Start: 2019-05-24 | End: 2019-05-24

## 2019-05-24 RX ORDER — DEXTROSE 50 % IN WATER 50 %
15 SYRINGE (ML) INTRAVENOUS ONCE
Refills: 0 | Status: DISCONTINUED | OUTPATIENT
Start: 2019-05-24 | End: 2019-05-27

## 2019-05-24 RX ORDER — GLUCAGON INJECTION, SOLUTION 0.5 MG/.1ML
1 INJECTION, SOLUTION SUBCUTANEOUS ONCE
Refills: 0 | Status: DISCONTINUED | OUTPATIENT
Start: 2019-05-24 | End: 2019-05-27

## 2019-05-24 RX ORDER — AZITHROMYCIN 500 MG/1
500 TABLET, FILM COATED ORAL ONCE
Refills: 0 | Status: COMPLETED | OUTPATIENT
Start: 2019-05-24 | End: 2019-05-24

## 2019-05-24 RX ORDER — ATORVASTATIN CALCIUM 80 MG/1
40 TABLET, FILM COATED ORAL AT BEDTIME
Refills: 0 | Status: DISCONTINUED | OUTPATIENT
Start: 2019-05-24 | End: 2019-05-27

## 2019-05-24 RX ORDER — PANTOPRAZOLE SODIUM 20 MG/1
40 TABLET, DELAYED RELEASE ORAL
Refills: 0 | Status: DISCONTINUED | OUTPATIENT
Start: 2019-05-24 | End: 2019-05-27

## 2019-05-24 RX ORDER — SODIUM CHLORIDE 9 MG/ML
1000 INJECTION, SOLUTION INTRAVENOUS
Refills: 0 | Status: DISCONTINUED | OUTPATIENT
Start: 2019-05-24 | End: 2019-05-27

## 2019-05-24 RX ORDER — ASPIRIN/CALCIUM CARB/MAGNESIUM 324 MG
81 TABLET ORAL DAILY
Refills: 0 | Status: DISCONTINUED | OUTPATIENT
Start: 2019-05-24 | End: 2019-05-27

## 2019-05-24 RX ORDER — BUDESONIDE AND FORMOTEROL FUMARATE DIHYDRATE 160; 4.5 UG/1; UG/1
2 AEROSOL RESPIRATORY (INHALATION)
Refills: 0 | Status: DISCONTINUED | OUTPATIENT
Start: 2019-05-24 | End: 2019-05-27

## 2019-05-24 RX ORDER — AZITHROMYCIN 500 MG/1
TABLET, FILM COATED ORAL
Refills: 0 | Status: DISCONTINUED | OUTPATIENT
Start: 2019-05-24 | End: 2019-05-25

## 2019-05-24 RX ORDER — ENOXAPARIN SODIUM 100 MG/ML
40 INJECTION SUBCUTANEOUS EVERY 24 HOURS
Refills: 0 | Status: DISCONTINUED | OUTPATIENT
Start: 2019-05-24 | End: 2019-05-27

## 2019-05-24 RX ORDER — TIOTROPIUM BROMIDE 18 UG/1
1 CAPSULE ORAL; RESPIRATORY (INHALATION) DAILY
Refills: 0 | Status: DISCONTINUED | OUTPATIENT
Start: 2019-05-24 | End: 2019-05-27

## 2019-05-24 RX ORDER — IPRATROPIUM/ALBUTEROL SULFATE 18-103MCG
3 AEROSOL WITH ADAPTER (GRAM) INHALATION EVERY 6 HOURS
Refills: 0 | Status: DISCONTINUED | OUTPATIENT
Start: 2019-05-24 | End: 2019-05-27

## 2019-05-24 RX ORDER — INSULIN LISPRO 100/ML
VIAL (ML) SUBCUTANEOUS
Refills: 0 | Status: DISCONTINUED | OUTPATIENT
Start: 2019-05-24 | End: 2019-05-24

## 2019-05-24 RX ORDER — LOSARTAN POTASSIUM 100 MG/1
50 TABLET, FILM COATED ORAL DAILY
Refills: 0 | Status: DISCONTINUED | OUTPATIENT
Start: 2019-05-24 | End: 2019-05-27

## 2019-05-24 RX ORDER — INSULIN LISPRO 100/ML
VIAL (ML) SUBCUTANEOUS
Refills: 0 | Status: DISCONTINUED | OUTPATIENT
Start: 2019-05-24 | End: 2019-05-27

## 2019-05-24 RX ORDER — CLOPIDOGREL BISULFATE 75 MG/1
75 TABLET, FILM COATED ORAL DAILY
Refills: 0 | Status: DISCONTINUED | OUTPATIENT
Start: 2019-05-24 | End: 2019-05-27

## 2019-05-24 RX ORDER — TAMSULOSIN HYDROCHLORIDE 0.4 MG/1
0.4 CAPSULE ORAL AT BEDTIME
Refills: 0 | Status: DISCONTINUED | OUTPATIENT
Start: 2019-05-24 | End: 2019-05-27

## 2019-05-24 RX ADMIN — Medication 4: at 23:55

## 2019-05-24 RX ADMIN — Medication 3 MILLILITER(S): at 18:44

## 2019-05-24 RX ADMIN — Medication 125 MILLIGRAM(S): at 18:43

## 2019-05-24 RX ADMIN — Medication 3 MILLILITER(S): at 18:43

## 2019-05-24 RX ADMIN — AZITHROMYCIN 255 MILLIGRAM(S): 500 TABLET, FILM COATED ORAL at 21:42

## 2019-05-24 NOTE — CONSULT NOTE ADULT - SUBJECTIVE AND OBJECTIVE BOX
COMMODORE TURNER [facility] P [id number]  1940 DOA 2019 DR BJ HILLIARD   ALLERGY Shellfish  CONTACT Sp Amira     Initial evaluation/Pulmonary Critical Care consultation requested on 2019    by Dr Hilliard   from Dr Dejesus   Patient examined chart reviewed    HOSPITAL ADMISSION   PATIENT CAME  FROM (if information available)        TYPE OF VISIT      Initial evaluation/Pulmonary Critical Care consultation requested on 2019    by Dr Hilliard   from Dr Dejesus     REASON FOR VISIT  For list of problems evaluated/addressed, please see problem list in the note below     PATIENT DESCRIPTION PATIENT  COMMODORE TURNER [facility] P [id number]  1940 DOA 2019 DR BJ HILLIARD    · Chief Complaint: The patient is a 79y Male complaining of difficulty breathing.  · HPI Objective Statement: 80 y/o male PMH COPD, DM, CAD presents to ED c/o sob x 5 hours. As per pts wife, pt woke up with some sob this am so she put pt back on his bipap machine. Pt sleeps with bipap as per pts wife. Denies any other complaints. States he otherwise feels good. Denies n/v, f/c, chest pain, numbness, tingling, headache, lightheadedness, dizziness, visual changes, abdominal pain.  · Presenting Symptoms: COUGH, SHORTNESS OF BREATH  · Negative Findings: no body aches, no chest pain, no chills, no diaphoresis, no edema, no fever, no headache, no hemoptysis, no wheezing  · Timing: gradual onset  · Duration: hour(s)  x5  · Severity: PAIN SCALE 5 OF 10.  · Context: coughing  · Recent Exposure To: none known  · Aggravated Factors: none  · Relieving Factors: none    PAST MEDICAL/SURGICAL/FAMILY/SOCIAL HISTORY:    Tobacco Usage:  · Tobacco Usage Unknown if ever smoked    ALLERGIES AND HOME MEDICATIONS:   Allergies:        Allergies:  No Known Allergies:        Intolerances:  shellfish: Food, Nausea, feels nauseous when eating crabs or shrimp but no true allergic reaction    Home Medications:   * Patient Currently Takes Medications as of 2019 13:12 documented in Structured Notes  · metoprolol tartrate 25 mg oral tablet: 1 tab(s) orally 2 times a day  · predniSONE 10 mg oral tablet: 4 tab(s) orally once a day x 2 days, then take 3 tabs orally once a day x 2 days, then take 2 tabs orally once a day x 2 days, then take 1 tab orally once a day x 2 days.    · azithromycin 500 mg oral tablet: 1 tab(s) orally every 24 hours  · valsartan 80 mg oral tablet: 1 tab(s) orally once a day  · Multiple Vitamins oral tablet: 1 tab(s) orally once a day  · clopidogrel 75 mg oral tablet: 1 tab(s) orally once a day  · aspirin 81 mg oral delayed release tablet: 1 tab(s) orally once a day  · atorvastatin 40 mg oral tablet: 1 tab(s) orally once a day  · glipiZIDE 5 mg oral tablet: 0.5 tab(s) orally 2 times a day  · tamsulosin 0.4 mg oral capsule: 1 cap(s) orally once a day (at bedtime)  · Breo Ellipta 100 mcg-25 mcg/inh inhalation powder: 1 puff(s) inhaled once a day  · Ventolin HFA 90 mcg/inh inhalation aerosol: 2 puff(s) inhaled 4 times a day  · Incruse Ellipta 62.5 mcg/inh inhalation powder: 1 puff(s) inhaled once a day  · metFORMIN 1000 mg oral tablet: 1 tab(s) orally 2 times a day    REVIEW OF SYSTEMS:    Review of Systems:  · CONSTITUTIONAL: no fever and no chills.  · EYES: no visual changes.  · ENMT: Ears: no ear pain and no hearing problems.Nose: no nasal congestion and no nasal drainage.Mouth/Throat: no dysphagia, no hoarseness and no throat pain.Neck: no lumps, no pain, no stiffness and no swollen glands.  · CARDIOVASCULAR: no chest pain and no edema.  · RESPIRATORY: - - -  · Respiratory [+]: COUGH, SHORTNESS OF BREATH  · Respiratory [-]: no exertional dyspnea, no hemoptysis, no orthopnea  · GASTROINTESTINAL: no abdominal pain, no bloating, no constipation, no diarrhea, no nausea and no vomiting.  · MUSCULOSKELETAL: no back pain, no gout, no musculoskeletal pain, no neck pain, and no weakness.  · SKIN: no abrasions, no jaundice, no lesions, no pruritis, and no rashes.  · NEURO: no loss of consciousness, no gait abnormality, no headache, no sensory deficits, and no weakness.  · ROS STATEMENT: all other ROS negative except as per HPI    VITAL SIGNS( Pullset):    ,,ED ADULT Flow Sheet:    24-May-2019 17:00  · Temp (F): 98  · Temp (C) Temp (C): 36.7  · Temp site Temp Site: oral  · Heart Rate Heart Rate (beats/min): 96  · BP Systolic Systolic: 148  · BP Diastolic Diastolic (mm Hg): 85  · Respiration Rate (breaths/min) Respiration Rate (breaths/min): 15  · SpO2 (%) SpO2 (%): 100  · O2 delivery Patient On: supplemental O2  · How was the weight captured? Weight Type/Method: stated  · Dosing Weight (KILOGRAMS) Dosing Weight (KILOGRAMS): 93.9  · Dosing Weight  (POUNDS) Dosing Weight (POUNDS): 207  · Height type Height Type: stated  · Height (FEET) Height (FEET): 5  · Height (INCHES) Height (INCHES): 11  · Height (CENTIMETERS) Height (CENTIMETERS): 180.34  · BSA (m2): 2.14  · BMI (kG/m2) BMI (kG/m2): 28.9  · Presence of Pain: denies pain/discomfort  · SpO2 (%) SpO2 (%): 100  · O2 delivery Patient On: supplemental O2  · Preferred Language to Address Healthcare Preferred Language to Address Healthcare: English    PHYSICAL EXAM:   · CONSTITUTIONAL: Well appearing, well nourished, awake, alert, oriented to person, place, time/situation and in no apparent distress.  · ENMT: Airway patent, Nasal mucosa clear. Mouth with normal mucosa. Throat has no vesicles, no oropharyngeal exudates and uvula is midline.  · EYES: Clear bilaterally, pupils equal, round and reactive to light.  · CARDIAC: Normal rate, regular rhythm.  Heart sounds S1, S2.  No murmurs, rubs or gallops.  · RESPIRATORY: - - -  · Respiratory Distress: no  · Breath Sounds: WHEEZES  · Wheezes: DIFFUSE  · Chest Exam: normal  · GASTROINTESTINAL: Abdomen soft, non-tender, no guarding.  · NEUROLOGICAL: Alert and oriented, no focal deficits, no motor or sensory deficits.  · SKIN: Skin normal color for race, warm, dry and intact. No evidence of rash.  · PSYCHIATRIC: Alert and oriented to person, place, time/situation. normal mood and affect. no apparent risk to self or others.     PATIENT COMMODORE YUE [facility] P [phone]  1940 DOA 2019 DR BJ HILLIARD     VITALS/LABS         2019 bpap 15/5/.3   2019 W 7.4 Hb 12.3 Plt 282 Na 143 K 4.5 CO2 32 Cr 1   2019 tr 1 n  2019 6p 3l 726/74/92  2019 CXR no infiltr      PATIENT  COMMODORE YUE [facility] P [phone]  1940 DOA 2019 DR BJ HILLIARD     MEDICATIONS     CAD   asa 81 (2019)   Plavix 75 (2019)   atorvastat 40 (2019)   Metoprolol 25.2 (2019)   COPD   duoneb (2019)   solumed 40.3 (2019)   DVT P   Lvnx 40 (2019  DM   iss (2019)   BPH   Tamsulosin .4 (2019)       GLOBAL ISSUE/BEST PRACTICE:      PROBLEM: HOB elevation:   y            PROBLEM: Stress ulcer proph:    na                      PROBLEM: VTE prophylaxis:   Lvnx 40 (2019)     PROBLEM: Glycemic control:    na  PROBLEM: Nutrition: DASH (2019)     PROBLEM: Advanced directive: na     PROBLEM: Allergies:  na

## 2019-05-24 NOTE — H&P ADULT - HISTORY OF PRESENT ILLNESS
Patient is a 79/M with PMHx significant for HTN, COPD (On home O2 2L and BIPAP at while sleeping regardless of time of day), CAD w/ 3v CABG, HLD, DM2 (on Metformin), hx Hypercapnic Resp Failure/Cardiac Arrest requiring intubation (6/'18), Presents to the ED for increased SOB since yesterday morning. Per wife, patient uses BiPAP when sleeping as he is a chronic CO2 retainer 2/2 COPD, denies hx of sleep apnea. Patient  and wife attempted to manage symptoms at home, keeping patient on BiPAP intermittently throughout the day and giving nebulizers PRN with some symptomatic relief, however when patient woke this morning nebs giving no relief. Unclear if any triggers for episode, but wife (Sania) reports that patients bedroom has been very warm. Reports some increased coughing frequency, but maintains coughs are dry, denies any mucus production. Of note, patient admitted to [facility] 4/23/19 for SOB, and again 4/4 prior to that.  No fever, chills, n/v/d, chest pain, lightheadedness.     ED Vitals: T 98.0F HR 96 /85 RR 16 SpO2 100% on 3L NC. Labs indicative of mild anemia (hb 12.3), cmp wnl, troponin negative x 1. ABG showing hypercapnic respiratory acidosis, at which point patient was placed on BiPAP. CXR unremarkable, s/p CABG. EKG 95 NSR    Patient seen and evaluated at the bedside, seen sitting up in bed with NC 3L in place, with increased work of breathing. Patient unable to speak in complete sentences due to SOB, wife (Sania) at the bedside, providing additional history for the patient. Denies any chest pain, fever, chills, URI symptoms, n/v/d.

## 2019-05-24 NOTE — H&P ADULT - PROBLEM SELECTOR PLAN 3
Chronic, stable  -Continue home meds -metoprolol tartrate 25mg q12h with hold parameters  -Home med valsartan not available at present, losartan 50mg qd given instead with hold parameters

## 2019-05-24 NOTE — H&P ADULT - NSHPPHYSICALEXAM_GEN_ALL_CORE
Physical Exam:  General: Well developed, frail, elderly black male, increased work of breathing  HEENT: NCAT, PERRLA, EOMI bl   Neck: Supple, nontender  Neurology: A&Ox3, nonfocal, CN II-XII grossly intact, sensation intact  Respiratory: diffuse expiratory wheezing in all lung fields, breathing  appeared labored, no accessory muscle use  CV: RRR, S1/S2, no murmurs, rubs or gallops  Abdominal: Soft, obese, NT, ND BSx4  Extremities: No clubbing, edema, 2+b/l radial, PT, and DP peripheral pulses  MSK: Normal ROM, no joint erythema or warmth, no joint swelling   Skin: warm, dry, normal color

## 2019-05-24 NOTE — H&P ADULT - PROBLEM SELECTOR PLAN 4
Chronic  -Home meds include metformin and glipizide  -Will place patient on low dose insulin sliding scale, accuchecks, and hypoglycemia protocol

## 2019-05-24 NOTE — H&P ADULT - ASSESSMENT
Patient is a 79/M with PMHx significant for HTN, COPD (On home O2 2L and BIPAP at while sleeping regardless of time of day), CAD w/ 3v CABG, HLD, DM2 (on Metformin), hx Hypercapnic Resp Failure/Cardiac Arrest requiring intubation (6/'18), Presents to the ED for increased SOB since yesterday morning. Attempted to alleviate symptoms with biPAP and nebs at home, but became increasingly SOB this AM and came to ED via ambulance. Admit to F for management of COPD exacerbation.

## 2019-05-24 NOTE — H&P ADULT - PROBLEM SELECTOR PLAN 6
IMPROVE VTE Individual Risk Assessment          RISK                                                          Points  [  ] Previous VTE                                                3  [  ] Thrombophilia                                            2  [  ] Lower limb paralysis                                  2        (unable to hold up >15 seconds)    [  ] Current Cancer                                            2         (within 6 months)  [  ] Immobilization > 24 hrs                             1  [  ] ICU/CCU stay > 24 hours                           1  [x  ] Age > 60                                                        1    IMPROVE VTE Score:1  will pharm vte ppx with lovenox 40mg daily  elevated HOB, aspiration precautions  ambulate with assistance  Diet - DASH/TLC with consistent carb

## 2019-05-24 NOTE — ED PROVIDER NOTE - ATTENDING CONTRIBUTION TO CARE
pt with hx copd c/o 3 days of worsening sob, wheezing with occ cough. no fevers, chills, ha, d/n/v, cp, edema, calf pain. pmd - [facility]  pulm - katie  wd, wn male, nad, s1s2 rrr, lungs b/l wheezing, abd soft, nt, nd pt with hx copd, cad, dm , htn, high chol, pvd, c/o 3 days of worsening sob, wheezing with occ cough significantly worsened today. no fevers, chills, ha, d/n/v, cp, edema, calf pain. pmd - [facility]  pulm - katie  wd, wn male, a&ox4 nad, s1s2 rrr, lungs b/l wheezing, abd soft, nt, nd

## 2019-05-24 NOTE — ED PROVIDER NOTE - OBJECTIVE STATEMENT
80 y/o male PMH COPD, DM, CAD presents to ED c/o sob x 5 hours. As per pts wife, pt woke up with some sob this am so she put pt back on his bipap machine. Pt sleeps with bipap as per pts wife. Denies any other complaints. States he otherwise feels good. Denies n/v, f/c, chest pain, numbness, tingling, headache, lightheadedness, dizziness, visual changes, abdominal pain.

## 2019-05-24 NOTE — CONSULT NOTE ADULT - ASSESSMENT
PATIENT COMMODORE YUE [facility] P [phone]  1940 DOA 2019 DR BJ HILLIARD     ASSESSMENT/RECOMMENDATIONS (A/R)     AC HYPERCAPNIC RESP FAILURE   Monitor abg joelle determine need to escalate to invasive tushar   niv ordered  COPD ex   BD steroids laba lama   BRONCHITIS   azithro started   CAD   On asa plvx bb  RO DVT  check v duplex  RO CHF  3/20/2019 echo ef 55% paradx motion septum prior cardiac surgery dd1 pasp 29                COMMODORE YUE [facility] P [phone]  1940 DOA 2019 DR BJ HILLIARD          DISPOSITION/PLAN  Gold D COPD with recurrent frequent hos HO CAC 2018 HO intubation 2019 admitted 2019 with copd ex   Rx with bd steroids abio Monitor abg on niv to see need for mv     TIME SPENT Over 55 minutes aggregate care time spent on encounter; activities included   direct patient care, counseling and/or coordinating care reviewing notes, lab data/ imaging , discussion with multidisciplinary team/ patient  /family. Risks, benefits, alternatives  discussed in detail.

## 2019-05-24 NOTE — H&P ADULT - PROBLEM SELECTOR PLAN 1
Patient with worsening SOB over the past 2 days with associated increase in frequency fo dry cough despite use of BiPAP and nebs at home, trigger unclear in case  -Patient with increased WOB, started on BiPAP in ED, continue when sleeping and PRN  -ABG showing hypercapnic RF, has since been placed on BiPAP, f/u repeat ABG. ICU to evaluate patient, has required critical care and intubation in the past for COPD exacerbation.   -S/p 125mg solumedrol in ED, will continue on 40mg daily. Continue duonebs q4h standing, and elevated head of bed  -Check rapid flu, and procalcitonin. Unlikely infectious in nature given no fever, wbc count.  -Pulm consult (Dr triplett)  -Admit to F with continuous pulse ox monitoring Patient with worsening SOB over the past 2 days with associated increase in frequency fo dry cough despite use of BiPAP and nebs at home, trigger unclear in case  -Patient with increased WOB, started on BiPAP in ED, continue when sleeping and PRN  -ABG showing hypercapnic RF, has since been placed on BiPAP, f/u repeat ABG. ICU to evaluate patient, has required critical care and intubation in the past for COPD exacerbation.   -S/p 125mg solumedrol in ED, will continue on 40mg q8h. Continue duonebs q6h standing, and elevated head of bed  -Check rapid flu, and procalcitonin. Unlikely infectious in nature given no fever, wbc count.  -Pulm consult (Dr triplett)  -Admit to F with continuous pulse ox monitoring Patient with worsening SOB over the past 2 days with associated increase in frequency fo dry cough despite use of BiPAP and nebs at home, trigger unclear in case  -Patient with increased WOB, started on BiPAP in ED, continue when sleeping and PRN  -ABG showing hypercapnic RF, has since been placed on BiPAP, f/u repeat ABG. ICU to evaluate patient, has required critical care and intubation in the past for COPD exacerbation.   -S/p 125mg solumedrol in ED, will continue on 40mg q8h. Continue duonebs q6h standing, and elevated head of bed  -Check rapid flu, and procalcitonin. Unlikely infectious in nature given no fever, wbc count.  -Pulm consult (Dr Dejesus) placed, will evaluate patient, f/u recs  -Admit to F with continuous pulse ox monitoring  -F/u repeat AGB and ICU consult

## 2019-05-24 NOTE — H&P ADULT - ATTENDING COMMENTS
acute on chronic hypercarbic respiratory failure - on Bipap.  repeat ABG and ICU consult pending.  Steroids, nebs, low threshold for abx.  Avoid sedatives.  Diffuse wheezing on exam  No confusion or AMS.  wife at bedside.    Titrate insulin based on glucose readings.  Hyperglycemia from steroids.    POOR long term prognosis

## 2019-05-24 NOTE — ED PROVIDER NOTE - PROGRESS NOTE DETAILS
spoke with pulm dr allen who will come see pt. pt breathing even and non-labored, improved after duoneb x 2 spoke with pulm dr wilson who advised repeat ABG in 1 hour and bipap @ 15/5 30% o2. spoke with respiratory who is aware. also spoke with hospitalist who is aware. will call icu consult. spoke with ICU JONAH Dawson who will come consult on pt.

## 2019-05-24 NOTE — H&P ADULT - NSHPSOCIALHISTORY_GEN_ALL_CORE
Tobacco: former 1 ppd smoker quit 30 years ago  EtOH: Denies  Recreational drug use: occasionally smokes marijuana, last time ~1year ago  Lives: with wife, 1 dog in the home  Ambulates: at baseline independently without assistive devices

## 2019-05-24 NOTE — CONSULT NOTE ADULT - SUBJECTIVE AND OBJECTIVE BOX
Patient is a 79y old  Male who presents with a chief complaint of SOB (24 May 2019 20:48)    PAST MEDICAL & SURGICAL HISTORY:  PVD (peripheral vascular disease)  Hypertension  COPD (chronic obstructive pulmonary disease)  Osteomyelitis  Dyslipidemia  CAD (Coronary Artery Disease)  Diabetes Mellitus, Type II  Compound fracture: left leg  CABG (Coronary Artery Bypass Graft)    YUE COMMODORE   79y    Male    BRIEF HOSPITAL COURSE:    Review of Systems:                       All other ROS are negative.    Allergies    No Known Allergies    Intolerances    shellfish (Nausea)    Weight (kg): 93.9 (05-24-19 @ 17:00)  BMI (kg/m2): 28.9 (05-24-19 @ 17:00)    ICU Vital Signs Last 24 Hrs  T(C): 36.3 (24 May 2019 22:01), Max: 36.7 (24 May 2019 17:00)  T(F): 97.4 (24 May 2019 22:01), Max: 98 (24 May 2019 17:00)  HR: 96 (24 May 2019 22:01) (93 - 96)  BP: 102/70 (24 May 2019 22:01) (102/70 - 162/73)  BP(mean): --  ABP: --  ABP(mean): --  RR: 18 (24 May 2019 22:01) (15 - 18)  SpO2: 98% (24 May 2019 22:01) (98% - 100%)    Physical Examination:    General:     HEENT:     PULM:     CVS:     ABD:     EXT:     SKIN:     Neuro:    ABG - ( 24 May 2019 20:38 )  pH, Arterial: 7.31  pH, Blood: x     /  pCO2: 62    /  pO2: 87    / HCO3: 27    / Base Excess: 4.2   /  SaO2: 96                    LABS:                        12.3   7.45  )-----------( 282      ( 24 May 2019 17:49 )             40.1     05-24    143  |  105  |  9   ----------------------------<  118<H>  4.5   |  32<H>  |  1.00    Ca    9.1      24 May 2019 17:49    TPro  6.6  /  Alb  3.6  /  TBili  0.3  /  DBili  x   /  AST  16  /  ALT  21  /  AlkPhos  94  05-24      CARDIAC MARKERS ( 24 May 2019 17:49 )  <.015 ng/mL / x     / x     / x     / x          CAPILLARY BLOOD GLUCOSE      POCT Blood Glucose.: 234 mg/dL (24 May 2019 23:50)        CULTURES:      Medications:  MEDICATIONS  (STANDING):  ALBUTerol/ipratropium for Nebulization. 3 milliLiter(s) Nebulizer every 6 hours  aspirin enteric coated 81 milliGRAM(s) Oral daily  atorvastatin 40 milliGRAM(s) Oral at bedtime  azithromycin  IVPB      buDESOnide 160 MICROgram(s)/formoterol 4.5 MICROgram(s) Inhaler 2 Puff(s) Inhalation two times a day  clopidogrel Tablet 75 milliGRAM(s) Oral daily  dextrose 5%. 1000 milliLiter(s) (50 mL/Hr) IV Continuous <Continuous>  dextrose 50% Injectable 12.5 Gram(s) IV Push once  dextrose 50% Injectable 25 Gram(s) IV Push once  dextrose 50% Injectable 25 Gram(s) IV Push once  enoxaparin Injectable 40 milliGRAM(s) SubCutaneous every 24 hours  insulin lispro (HumaLOG) corrective regimen sliding scale   SubCutaneous Before meals and at bedtime  losartan 50 milliGRAM(s) Oral daily  methylPREDNISolone sodium succinate Injectable 40 milliGRAM(s) IV Push every 8 hours  metoprolol tartrate 25 milliGRAM(s) Oral two times a day  multivitamin 1 Tablet(s) Oral daily  pantoprazole    Tablet 40 milliGRAM(s) Oral before breakfast  tamsulosin 0.4 milliGRAM(s) Oral at bedtime  tiotropium 18 MICROgram(s) Capsule 1 Capsule(s) Inhalation daily    MEDICATIONS  (PRN):  dextrose 40% Gel 15 Gram(s) Oral once PRN Blood Glucose LESS THAN 70 milliGRAM(s)/deciliter  glucagon  Injectable 1 milliGRAM(s) IntraMuscular once PRN Glucose LESS THAN 70 milligrams/deciliter          RADIOLOGY/IMAGING/ECHO  < from: US Duplex Venous Lower Ext Complete, Bilateral (05.24.19 @ 21:57) >    IMPRESSION:     No evidence of bilateral lower extremity deep venous thrombosis.        < from: Xray Chest 1 View- PORTABLE-Urgent (05.24.19 @ 17:32) >  IMPRESSION: No gross consolidation is seen.   Status post CABG.     < end of copied text >          Assessment/Plan:     79 y/o M with a h/o COPD (on 2L home O2/BiPAP qHS), CAD (s/p CABG), HTN, HLD, DM, recent admission for hypercapnic respiratory failure with subsequent cardiac arrest, presents to the ED c/o SOB.     Has has multiple admissions over  the last several months for AECOPD.      Went to pulm rehab at [facility] on Tuesday, got SOB and diaphoretic on the treadmill.  Has not been the same since.  Today worsening resp status.       Initial ABG  7.26/74/92 was wheezing BIPAP applied duonebs given  Viral panel neg.      WOB OK/ABG improved with NIV  settings increased.    NIV overnight  Steroids BD azithro for anti-infalamatory effect   Repeat ABG in AM.    Acute on chronic resp acidosis.      DVT prophylaxis.  Doppler LE is neg    Scheduled for  elective cath next month.    D/W Dr Pitt E-ICU can got to tele pulse ox floor. Patient is a 79y old  Male who presents with a chief complaint of SOB (24 May 2019 20:48)    PAST MEDICAL & SURGICAL HISTORY:  PVD (peripheral vascular disease)  Hypertension  COPD (chronic obstructive pulmonary disease)  Osteomyelitis  Dyslipidemia  CAD (Coronary Artery Disease)  Diabetes Mellitus, Type II  Compound fracture: left leg  CABG (Coronary Artery Bypass Graft)    YUE COMMODORE   79y    Male    BRIEF HOSPITAL COURSE:    Review of Systems:                       All other ROS are negative.    Allergies    No Known Allergies    Intolerances    shellfish (Nausea)    Weight (kg): 93.9 (05-24-19 @ 17:00)  BMI (kg/m2): 28.9 (05-24-19 @ 17:00)    ICU Vital Signs Last 24 Hrs  T(C): 36.3 (24 May 2019 22:01), Max: 36.7 (24 May 2019 17:00)  T(F): 97.4 (24 May 2019 22:01), Max: 98 (24 May 2019 17:00)  HR: 96 (24 May 2019 22:01) (93 - 96)  BP: 102/70 (24 May 2019 22:01) (102/70 - 162/73)  BP(mean): --  ABP: --  ABP(mean): --  RR: 18 (24 May 2019 22:01) (15 - 18)  SpO2: 98% (24 May 2019 22:01) (98% - 100%)    Physical Examination:    General:   WOB OK    HEENT: no JVD    PULM: bilateral BS not wheezing now     CVS: s1 s2 reg     ABD: soft NT    EXT: no edema     SKIN: warm    Neuro: alert oriented     ABG - ( 24 May 2019 20:38 )  pH, Arterial: 7.31  pH, Blood: x     /  pCO2: 62    /  pO2: 87    / HCO3: 27    / Base Excess: 4.2   /  SaO2: 96                    LABS:                        12.3   7.45  )-----------( 282      ( 24 May 2019 17:49 )             40.1     05-24    143  |  105  |  9   ----------------------------<  118<H>  4.5   |  32<H>  |  1.00    Ca    9.1      24 May 2019 17:49    TPro  6.6  /  Alb  3.6  /  TBili  0.3  /  DBili  x   /  AST  16  /  ALT  21  /  AlkPhos  94  05-24      CARDIAC MARKERS ( 24 May 2019 17:49 )  <.015 ng/mL / x     / x     / x     / x          CAPILLARY BLOOD GLUCOSE      POCT Blood Glucose.: 234 mg/dL (24 May 2019 23:50)        CULTURES:      Medications:  MEDICATIONS  (STANDING):  ALBUTerol/ipratropium for Nebulization. 3 milliLiter(s) Nebulizer every 6 hours  aspirin enteric coated 81 milliGRAM(s) Oral daily  atorvastatin 40 milliGRAM(s) Oral at bedtime  azithromycin  IVPB      buDESOnide 160 MICROgram(s)/formoterol 4.5 MICROgram(s) Inhaler 2 Puff(s) Inhalation two times a day  clopidogrel Tablet 75 milliGRAM(s) Oral daily  dextrose 5%. 1000 milliLiter(s) (50 mL/Hr) IV Continuous <Continuous>  dextrose 50% Injectable 12.5 Gram(s) IV Push once  dextrose 50% Injectable 25 Gram(s) IV Push once  dextrose 50% Injectable 25 Gram(s) IV Push once  enoxaparin Injectable 40 milliGRAM(s) SubCutaneous every 24 hours  insulin lispro (HumaLOG) corrective regimen sliding scale   SubCutaneous Before meals and at bedtime  losartan 50 milliGRAM(s) Oral daily  methylPREDNISolone sodium succinate Injectable 40 milliGRAM(s) IV Push every 8 hours  metoprolol tartrate 25 milliGRAM(s) Oral two times a day  multivitamin 1 Tablet(s) Oral daily  pantoprazole    Tablet 40 milliGRAM(s) Oral before breakfast  tamsulosin 0.4 milliGRAM(s) Oral at bedtime  tiotropium 18 MICROgram(s) Capsule 1 Capsule(s) Inhalation daily    MEDICATIONS  (PRN):  dextrose 40% Gel 15 Gram(s) Oral once PRN Blood Glucose LESS THAN 70 milliGRAM(s)/deciliter  glucagon  Injectable 1 milliGRAM(s) IntraMuscular once PRN Glucose LESS THAN 70 milligrams/deciliter          RADIOLOGY/IMAGING/ECHO  < from: US Duplex Venous Lower Ext Complete, Bilateral (05.24.19 @ 21:57) >    IMPRESSION:     No evidence of bilateral lower extremity deep venous thrombosis.        < from: Xray Chest 1 View- PORTABLE-Urgent (05.24.19 @ 17:32) >  IMPRESSION: No gross consolidation is seen.   Status post CABG.     < end of copied text >          Assessment/Plan:     79 y/o M with a h/o COPD (on 2L home O2/BiPAP qHS), CAD (s/p CABG), HTN, HLD, DM, recent admission for hypercapnic respiratory failure with subsequent cardiac arrest, presents to the ED c/o SOB.     Has has multiple admissions over  the last several months for AECOPD.      Went to pulm rehab at [facility] on Tuesday, got SOB and diaphoretic on the treadmill.  Has not been the same since.  Today worsening resp status.       Initial ABG  7.26/74/92 was wheezing BIPAP applied duonebs given  Viral panel neg.      WOB OK/ABG improved  feels much better with NIV  settings increased.    NIV overnight  Steroids BD azithro for anti-infalamatory effect   Repeat ABG in AM.    Acute on chronic resp acidosis.      DVT prophylaxis.  Doppler LE is neg    Scheduled for  elective cath next month.    D/W Dr Pitt E-ICU can got to tele pulse ox floor.

## 2019-05-24 NOTE — H&P ADULT - NSHPREVIEWOFSYSTEMS_GEN_ALL_CORE
REVIEW OF SYSTEMS  Constitutional: No fever, chills   Neuro: No headache, numbness, weakness  Resp:  Admits to Cough, wheezing and dyspnea on exertion   CVS: No chest pain, palpitations, leg swelling   GI: No abdominal pain, nausea, vomiting, diarrhea   : No dysuria, frequency  Skin: No itching, burning, rashes  Msk: No weakness, joint pain

## 2019-05-25 LAB
ALBUMIN SERPL ELPH-MCNC: 3.3 G/DL — SIGNIFICANT CHANGE UP (ref 3.3–5)
ALP SERPL-CCNC: 88 U/L — SIGNIFICANT CHANGE UP (ref 40–120)
ALT FLD-CCNC: 19 U/L — SIGNIFICANT CHANGE UP (ref 12–78)
ANION GAP SERPL CALC-SCNC: 5 MMOL/L — SIGNIFICANT CHANGE UP (ref 5–17)
AST SERPL-CCNC: 12 U/L — LOW (ref 15–37)
BASE EXCESS BLDA CALC-SCNC: 5.3 MMOL/L — HIGH (ref -2–2)
BASOPHILS # BLD AUTO: 0 K/UL — SIGNIFICANT CHANGE UP (ref 0–0.2)
BASOPHILS NFR BLD AUTO: 0 % — SIGNIFICANT CHANGE UP (ref 0–2)
BILIRUB SERPL-MCNC: 0.3 MG/DL — SIGNIFICANT CHANGE UP (ref 0.2–1.2)
BLOOD GAS COMMENTS ARTERIAL: SIGNIFICANT CHANGE UP
BUN SERPL-MCNC: 13 MG/DL — SIGNIFICANT CHANGE UP (ref 7–23)
CALCIUM SERPL-MCNC: 9.2 MG/DL — SIGNIFICANT CHANGE UP (ref 8.5–10.1)
CHLORIDE SERPL-SCNC: 103 MMOL/L — SIGNIFICANT CHANGE UP (ref 96–108)
CO2 SERPL-SCNC: 33 MMOL/L — HIGH (ref 22–31)
CREAT SERPL-MCNC: 1.2 MG/DL — SIGNIFICANT CHANGE UP (ref 0.5–1.3)
EOSINOPHIL # BLD AUTO: 0 K/UL — SIGNIFICANT CHANGE UP (ref 0–0.5)
EOSINOPHIL NFR BLD AUTO: 0 % — SIGNIFICANT CHANGE UP (ref 0–6)
GLUCOSE SERPL-MCNC: 276 MG/DL — HIGH (ref 70–99)
HCO3 BLDA-SCNC: 29 MMOL/L — HIGH (ref 23–27)
HCT VFR BLD CALC: 38.4 % — LOW (ref 39–50)
HGB BLD-MCNC: 12 G/DL — LOW (ref 13–17)
HOROWITZ INDEX BLDA+IHG-RTO: 25 — SIGNIFICANT CHANGE UP
INR BLD: 1.08 RATIO — SIGNIFICANT CHANGE UP (ref 0.88–1.16)
LYMPHOCYTES # BLD AUTO: 0.45 K/UL — LOW (ref 1–3.3)
LYMPHOCYTES # BLD AUTO: 7 % — LOW (ref 13–44)
MCHC RBC-ENTMCNC: 27.8 PG — SIGNIFICANT CHANGE UP (ref 27–34)
MCHC RBC-ENTMCNC: 31.3 GM/DL — LOW (ref 32–36)
MCV RBC AUTO: 89.1 FL — SIGNIFICANT CHANGE UP (ref 80–100)
MONOCYTES # BLD AUTO: 0.25 K/UL — SIGNIFICANT CHANGE UP (ref 0–0.9)
MONOCYTES NFR BLD AUTO: 4 % — SIGNIFICANT CHANGE UP (ref 2–14)
NEUTROPHILS # BLD AUTO: 5.66 K/UL — SIGNIFICANT CHANGE UP (ref 1.8–7.4)
NEUTROPHILS NFR BLD AUTO: 85 % — HIGH (ref 43–77)
NRBC # BLD: SIGNIFICANT CHANGE UP /100 WBCS (ref 0–0)
PCO2 BLDA: 51 MMHG — HIGH (ref 32–46)
PH BLDA: 7.39 — SIGNIFICANT CHANGE UP (ref 7.35–7.45)
PHOSPHATE SERPL-MCNC: 2.2 MG/DL — LOW (ref 2.5–4.5)
PLATELET # BLD AUTO: 278 K/UL — SIGNIFICANT CHANGE UP (ref 150–400)
PO2 BLDA: 91 MMHG — SIGNIFICANT CHANGE UP (ref 74–108)
POTASSIUM SERPL-MCNC: 4.5 MMOL/L — SIGNIFICANT CHANGE UP (ref 3.5–5.3)
POTASSIUM SERPL-SCNC: 4.5 MMOL/L — SIGNIFICANT CHANGE UP (ref 3.5–5.3)
PROT SERPL-MCNC: 6.5 G/DL — SIGNIFICANT CHANGE UP (ref 6–8.3)
PROTHROM AB SERPL-ACNC: 12.4 SEC — SIGNIFICANT CHANGE UP (ref 10–12.9)
RAPID RVP RESULT: SIGNIFICANT CHANGE UP
RBC # BLD: 4.31 M/UL — SIGNIFICANT CHANGE UP (ref 4.2–5.8)
RBC # FLD: 12.5 % — SIGNIFICANT CHANGE UP (ref 10.3–14.5)
SAO2 % BLDA: 98 % — HIGH (ref 92–96)
SODIUM SERPL-SCNC: 141 MMOL/L — SIGNIFICANT CHANGE UP (ref 135–145)
TROPONIN I SERPL-MCNC: <.015 NG/ML — SIGNIFICANT CHANGE UP (ref 0.01–0.04)
WBC # BLD: 6.36 K/UL — SIGNIFICANT CHANGE UP (ref 3.8–10.5)
WBC # FLD AUTO: 6.36 K/UL — SIGNIFICANT CHANGE UP (ref 3.8–10.5)

## 2019-05-25 PROCEDURE — 99233 SBSQ HOSP IP/OBS HIGH 50: CPT

## 2019-05-25 RX ORDER — INSULIN GLARGINE 100 [IU]/ML
10 INJECTION, SOLUTION SUBCUTANEOUS AT BEDTIME
Refills: 0 | Status: DISCONTINUED | OUTPATIENT
Start: 2019-05-25 | End: 2019-05-27

## 2019-05-25 RX ORDER — AZITHROMYCIN 500 MG/1
500 TABLET, FILM COATED ORAL EVERY 24 HOURS
Refills: 0 | Status: DISCONTINUED | OUTPATIENT
Start: 2019-05-25 | End: 2019-05-27

## 2019-05-25 RX ADMIN — Medication 81 MILLIGRAM(S): at 11:18

## 2019-05-25 RX ADMIN — BUDESONIDE AND FORMOTEROL FUMARATE DIHYDRATE 2 PUFF(S): 160; 4.5 AEROSOL RESPIRATORY (INHALATION) at 06:57

## 2019-05-25 RX ADMIN — Medication 4: at 08:23

## 2019-05-25 RX ADMIN — Medication 3 MILLILITER(S): at 13:44

## 2019-05-25 RX ADMIN — Medication 3 MILLILITER(S): at 02:00

## 2019-05-25 RX ADMIN — TAMSULOSIN HYDROCHLORIDE 0.4 MILLIGRAM(S): 0.4 CAPSULE ORAL at 22:41

## 2019-05-25 RX ADMIN — LOSARTAN POTASSIUM 50 MILLIGRAM(S): 100 TABLET, FILM COATED ORAL at 06:12

## 2019-05-25 RX ADMIN — INSULIN GLARGINE 10 UNIT(S): 100 INJECTION, SOLUTION SUBCUTANEOUS at 22:17

## 2019-05-25 RX ADMIN — ATORVASTATIN CALCIUM 40 MILLIGRAM(S): 80 TABLET, FILM COATED ORAL at 22:40

## 2019-05-25 RX ADMIN — AZITHROMYCIN 500 MILLIGRAM(S): 500 TABLET, FILM COATED ORAL at 22:40

## 2019-05-25 RX ADMIN — Medication 25 MILLIGRAM(S): at 06:12

## 2019-05-25 RX ADMIN — Medication 40 MILLIGRAM(S): at 13:20

## 2019-05-25 RX ADMIN — CLOPIDOGREL BISULFATE 75 MILLIGRAM(S): 75 TABLET, FILM COATED ORAL at 11:18

## 2019-05-25 RX ADMIN — PANTOPRAZOLE SODIUM 40 MILLIGRAM(S): 20 TABLET, DELAYED RELEASE ORAL at 06:12

## 2019-05-25 RX ADMIN — TAMSULOSIN HYDROCHLORIDE 0.4 MILLIGRAM(S): 0.4 CAPSULE ORAL at 00:10

## 2019-05-25 RX ADMIN — Medication 2: at 22:17

## 2019-05-25 RX ADMIN — Medication 25 MILLIGRAM(S): at 17:15

## 2019-05-25 RX ADMIN — Medication 3 MILLILITER(S): at 19:31

## 2019-05-25 RX ADMIN — Medication 6: at 12:21

## 2019-05-25 RX ADMIN — ATORVASTATIN CALCIUM 40 MILLIGRAM(S): 80 TABLET, FILM COATED ORAL at 00:09

## 2019-05-25 RX ADMIN — BUDESONIDE AND FORMOTEROL FUMARATE DIHYDRATE 2 PUFF(S): 160; 4.5 AEROSOL RESPIRATORY (INHALATION) at 22:41

## 2019-05-25 RX ADMIN — Medication 1 TABLET(S): at 11:18

## 2019-05-25 RX ADMIN — Medication 40 MILLIGRAM(S): at 00:10

## 2019-05-25 RX ADMIN — Medication 40 MILLIGRAM(S): at 06:56

## 2019-05-25 RX ADMIN — Medication 6: at 17:15

## 2019-05-25 RX ADMIN — ENOXAPARIN SODIUM 40 MILLIGRAM(S): 100 INJECTION SUBCUTANEOUS at 00:09

## 2019-05-25 RX ADMIN — Medication 3 MILLILITER(S): at 07:44

## 2019-05-25 RX ADMIN — TIOTROPIUM BROMIDE 1 CAPSULE(S): 18 CAPSULE ORAL; RESPIRATORY (INHALATION) at 06:57

## 2019-05-25 NOTE — PHARMACOTHERAPY INTERVENTION NOTE - COMMENTS
Patient on IV azithromycin for exacerbation. Spoke with physician to recommend changing to PO, as patient is currently tolerating other PO medications and conversion from IV to PO for azithromycin is 1:1

## 2019-05-25 NOTE — PROGRESS NOTE ADULT - SUBJECTIVE AND OBJECTIVE BOX
Patient was examined Chart reviewed Detailed note will be inserted below after going over latest data Meanwhile please refer to my previous notes for Pulmonary/Critical Care assessment recommendations Patient was examined Chart reviewed Detailed note will be inserted below after going over latest data Meanwhile please refer to my previous notes for Pulmonary/Critical Care assessment recommendations     COMMBRIANNE TURNER [facility] P [id number]  1940 [address]A 2019 DR BJ KNUTSON   ALLERGY Shellfish  CONTACT Sp Amiar EATON    Initial evaluation/Pulmonary Critical Care consultation requested on 2019    by Dr Knutson   from Dr Dejesus   Patient examined chart reviewed    HOSPITAL ADMISSION   PATIENT CAME  FROM (if information available)        TYPE OF VISIT      Subsequent pulmonary followup      REASON FOR VISIT  For list of problems evaluated/addressed, please see problem list in the note below     PATIENT COMMBRIANNE TRUNER [facility] P [id number]  1940 DOA 2019 DR BJ KNUTSON     VITALS/LABS       2019 afeb 87 120/72 18 99%   2019 18/2019 W 6.3 Hb 12 Plt 278 INR 1 Na 141 K 4.5 CO2 33 Cr 1.2 PO4 2.2   2019 Tr 1 n   2019 6a  739/51/       PATIENT COMMODORE YUE [facility] P 8[address] [id number]  1940 DOA 2019 DR BJ KNUTSON     PROBLEMS     AC HYPERCAPNIC RESP FAILURE   2019 6a  739/51/91   2019 6p 3l 726/74/92  COPD ex   RESP TRACT INFECTION   W - W 7.4-6.3   X 5/24/2019 CXR no infiltr  P     M  flu ab n rsv n   M 2019 rvp n   CAD   E -2019 Tr 1 n.2   RO DVT  D 2019 V duplex n   RO CHF  3/20/2019 echo ef 55% paradx motion septum prior cardiac surgery dd1 pasp 29      PATIENT  COMMODALAN TURNER [facility] P 8[address] [id number]  1940 DOA 2019 DR BJ KNUTSON     MEDICATIONS     CAD   asa 81 (2019)   Plavix 75 (2019)   atorvastat 40 (2019)   Metoprolol 25.2 (2019)   ANTIBIO  azithro (2019)     COPD   duoneb (2019)   solumed 40.3 (2019)   DVT P   Lvnx 40 (2019  DM   iss (2019)   BPH   Tamsulosin .4 (2019)       GLOBAL ISSUE/BEST PRACTICE:      PROBLEM: HOB elevation:   y            PROBLEM: Stress ulcer proph:    na                      PROBLEM: VTE prophylaxis:   Lvnx 40 (2019)     PROBLEM: Glycemic control:    na  PROBLEM: Nutrition: DASH (2019)     PROBLEM: Advanced directive: na     PROBLEM: Allergies:  na    REVIEW OF SYMPTOMS     NOTE Changess if any  in ROS and PE are updated in daily HOSPITAL COURSE below      Able to give ROS  Yes     RELIABLE No   CONSTITUTIONAL Weakness Yes  Chills No Vision changes No  ENDOCRINE No unexplained hair loss No heat or cold intolerance    ALLERGY No hives  Sore throat No   RESP Coughing blood no  Shortness of breath YES   NEURO No Headache  Confusion Pain neck No   CARDIAC No Chest pain No Palpitations   GI No Pain abdomen NO   Vomiting NO     PHYSICAL EXAM    HEENT Unremarkable PERRLA atraumatic   RESP Fair air entry EXP prolonged    Harsh breath sound Resp distres mild   CARDIAC S1 S2 No S3     NO JVD    ABDOMEN SOFT BS PRESENT NOT DISTENDED No hepatosplenomegaly PEDAL EDEMA present No calf tenderness  NO rash   GENERAL Not TOXIC looking

## 2019-05-25 NOTE — PROGRESS NOTE ADULT - NSHPATTENDINGPLANDISCUSS_GEN_ALL_CORE
patient re: steroids, antibiotics, oxygen, BiPAP, anticipated hospital course and discharge planning

## 2019-05-25 NOTE — PROGRESS NOTE ADULT - PROBLEM SELECTOR PLAN 4
Chronic  -Home meds include metformin and glipizide  -Will place patient on low dose insulin sliding scale, accuchecks, and hypoglycemia protocol. Add Lantus 10 units qHS for better glycemic control. Will likely improve with decrease doses of steroids as well.

## 2019-05-25 NOTE — PROGRESS NOTE ADULT - PROBLEM SELECTOR PLAN 1
Patient with worsening SOB over the past 2 days with associated increase in frequency fo dry cough despite use of BiPAP and nebs at home, trigger unclear in case  -Patient with increased WOB, started on BiPAP in ED, continue when sleeping and PRN  -ABG showing hypercapnic RF, has since been placed on BiPAP, f/u repeat ABG. ICU to evaluate patient, has required critical care and intubation in the past for COPD exacerbation.   -Taper Solumedrol to 40mg q12h. Continue duonebs q6h standing, and elevated head of bed  -Continue Azithromycin, change to PO.   -Pulm consult (Dr Dejesus) following  -Continuous pulse ox monitoring

## 2019-05-25 NOTE — PROGRESS NOTE ADULT - SUBJECTIVE AND OBJECTIVE BOX
ADMISSION HPI:  Patient is a 79/M with PMHx significant for HTN, COPD (On home O2 2L and BIPAP at while sleeping regardless of time of day), CAD w/ 3v CABG, HLD, DM2 (on Metformin), hx Hypercapnic Resp Failure/Cardiac Arrest requiring intubation (6/'18), Presents to the ED for increased SOB since yesterday morning. Per wife, patient uses BiPAP when sleeping as he is a chronic CO2 retainer 2/2 COPD, denies hx of sleep apnea. Patient  and wife attempted to manage symptoms at home, keeping patient on BiPAP intermittently throughout the day and giving nebulizers PRN with some symptomatic relief, however when patient woke this morning nebs giving no relief. Unclear if any triggers for episode, but wife (Sania) reports that patients bedroom has been very warm. Reports some increased coughing frequency, but maintains coughs are dry, denies any mucus production. Of note, patient admitted to [facility] 4/23/19 for SOB, and again 4/4 prior to that.  No fever, chills, n/v/d, chest pain, lightheadedness.     ED Vitals: T 98.0F HR 96 /85 RR 16 SpO2 100% on 3L NC. Labs indicative of mild anemia (hb 12.3), cmp wnl, troponin negative x 1. ABG showing hypercapnic respiratory acidosis, at which point patient was placed on BiPAP. CXR unremarkable, s/p CABG. EKG 95 NSR    Patient seen and evaluated at the bedside, seen sitting up in bed with NC 3L in place, with increased work of breathing. Patient unable to speak in complete sentences due to SOB, wife (Sania) at the bedside, providing additional history for the patient. Denies any chest pain, fever, chills, URI symptoms, n/v/d. (24 May 2019 19:02)    INTERVAL HPI:  Patient seen and examined. Feeling much better. "I think I can probably go home tomorrow". Denies shortness of breath, chest pain, palpitations.     REVIEW OF SYSTEMS:    CONSTITUTIONAL: No weakness, fevers, or chills  EYES/ENT: No visual changes, no throat pain   RESPIRATORY: No cough, wheezing, hemoptysis; No shortness of breath  CARDIOVASCULAR: No chest pain or palpitations  GASTROINTESTINAL: No abdominal pain, nausea, vomiting, or diarrhea  GENITOURINARY: No dysuria, frequency or hematuria  NEUROLOGICAL: No dizziness, numbness, or weakness  SKIN: No itching, burning, rashes, or lesions   All other review of systems is negative unless indicated above.    VITAL SIGNS:  Vital Signs Last 24 Hrs  T(C): 36.6 (05-25-19 @ 14:13), Max: 36.7 (05-24-19 @ 17:00)  T(F): 97.9 (05-25-19 @ 14:13), Max: 98 (05-24-19 @ 17:00)  HR: 87 (05-25-19 @ 14:13) (80 - 103)  BP: 126/72 (05-25-19 @ 14:13) (102/70 - 162/73)  BP(mean): --  RR: 18 (05-25-19 @ 14:13) (15 - 18)  SpO2: 99% (05-25-19 @ 14:13) (98% - 100%)      PHYSICAL EXAM:     GENERAL: no acute distress  HEENT: NC/AT, EOMI, neck supple, MMM  RESPIRATORY: LCTAB/L, no rhonchi, rales, or wheezing  CARDIOVASCULAR: RRR, no murmurs, gallops, rubs  ABDOMINAL: soft, non-tender, non-distended, positive bowel sounds   EXTREMITIES: no clubbing, cyanosis, or edema  NEUROLOGICAL: alert and oriented x 3, non-focal  SKIN: warm, dry, intact  MUSCULOSKELETAL: no gross joint deformity                          12.0   6.36  )-----------( 278      ( 25 May 2019 06:59 )             38.4     05-25    141  |  103  |  13  ----------------------------<  276<H>  4.5   |  33<H>  |  1.20    Ca    9.2      25 May 2019 06:59  Phos  2.2     05-25    TPro  6.5  /  Alb  3.3  /  TBili  0.3  /  DBili  x   /  AST  12<L>  /  ALT  19  /  AlkPhos  88  05-25    PT/INR - ( 25 May 2019 06:59 )   PT: 12.4 sec;   INR: 1.08 ratio           CAPILLARY BLOOD GLUCOSE      POCT Blood Glucose.: 270 mg/dL (25 May 2019 11:54)  POCT Blood Glucose.: 247 mg/dL (25 May 2019 08:15)  POCT Blood Glucose.: 234 mg/dL (24 May 2019 23:50)          MEDICATIONS  (STANDING):  ALBUTerol/ipratropium for Nebulization. 3 milliLiter(s) Nebulizer every 6 hours  aspirin enteric coated 81 milliGRAM(s) Oral daily  atorvastatin 40 milliGRAM(s) Oral at bedtime  azithromycin   Tablet 500 milliGRAM(s) Oral every 24 hours  buDESOnide 160 MICROgram(s)/formoterol 4.5 MICROgram(s) Inhaler 2 Puff(s) Inhalation two times a day  clopidogrel Tablet 75 milliGRAM(s) Oral daily  dextrose 5%. 1000 milliLiter(s) (50 mL/Hr) IV Continuous <Continuous>  dextrose 50% Injectable 12.5 Gram(s) IV Push once  dextrose 50% Injectable 25 Gram(s) IV Push once  dextrose 50% Injectable 25 Gram(s) IV Push once  enoxaparin Injectable 40 milliGRAM(s) SubCutaneous every 24 hours  insulin lispro (HumaLOG) corrective regimen sliding scale   SubCutaneous Before meals and at bedtime  losartan 50 milliGRAM(s) Oral daily  methylPREDNISolone sodium succinate Injectable 40 milliGRAM(s) IV Push every 12 hours  metoprolol tartrate 25 milliGRAM(s) Oral two times a day  multivitamin 1 Tablet(s) Oral daily  pantoprazole    Tablet 40 milliGRAM(s) Oral before breakfast  tamsulosin 0.4 milliGRAM(s) Oral at bedtime  tiotropium 18 MICROgram(s) Capsule 1 Capsule(s) Inhalation daily    MEDICATIONS  (PRN):  dextrose 40% Gel 15 Gram(s) Oral once PRN Blood Glucose LESS THAN 70 milliGRAM(s)/deciliter  glucagon  Injectable 1 milliGRAM(s) IntraMuscular once PRN Glucose LESS THAN 70 milligrams/deciliter

## 2019-05-26 LAB
ALBUMIN SERPL ELPH-MCNC: 3.3 G/DL — SIGNIFICANT CHANGE UP (ref 3.3–5)
ALP SERPL-CCNC: 79 U/L — SIGNIFICANT CHANGE UP (ref 40–120)
ALT FLD-CCNC: 17 U/L — SIGNIFICANT CHANGE UP (ref 12–78)
ANION GAP SERPL CALC-SCNC: 3 MMOL/L — LOW (ref 5–17)
AST SERPL-CCNC: 9 U/L — LOW (ref 15–37)
BASE EXCESS BLDV CALC-SCNC: 8 MMOL/L — HIGH (ref -2–2)
BILIRUB SERPL-MCNC: 0.3 MG/DL — SIGNIFICANT CHANGE UP (ref 0.2–1.2)
BLOOD GAS COMMENTS, VENOUS: SIGNIFICANT CHANGE UP
BUN SERPL-MCNC: 20 MG/DL — SIGNIFICANT CHANGE UP (ref 7–23)
CALCIUM SERPL-MCNC: 9.3 MG/DL — SIGNIFICANT CHANGE UP (ref 8.5–10.1)
CHLORIDE SERPL-SCNC: 101 MMOL/L — SIGNIFICANT CHANGE UP (ref 96–108)
CO2 SERPL-SCNC: 36 MMOL/L — HIGH (ref 22–31)
CREAT SERPL-MCNC: 1.2 MG/DL — SIGNIFICANT CHANGE UP (ref 0.5–1.3)
GLUCOSE SERPL-MCNC: 214 MG/DL — HIGH (ref 70–99)
HCO3 BLDV-SCNC: 31 MMOL/L — HIGH (ref 21–29)
HCT VFR BLD CALC: 35.7 % — LOW (ref 39–50)
HGB BLD-MCNC: 11.1 G/DL — LOW (ref 13–17)
INR BLD: 1.08 RATIO — SIGNIFICANT CHANGE UP (ref 0.88–1.16)
MCHC RBC-ENTMCNC: 27.8 PG — SIGNIFICANT CHANGE UP (ref 27–34)
MCHC RBC-ENTMCNC: 31.1 GM/DL — LOW (ref 32–36)
MCV RBC AUTO: 89.3 FL — SIGNIFICANT CHANGE UP (ref 80–100)
NRBC # BLD: 0 /100 WBCS — SIGNIFICANT CHANGE UP (ref 0–0)
PCO2 BLDV: 55 MMHG — HIGH (ref 35–50)
PH BLDV: 7.39 — SIGNIFICANT CHANGE UP (ref 7.35–7.45)
PLATELET # BLD AUTO: 274 K/UL — SIGNIFICANT CHANGE UP (ref 150–400)
PO2 BLDV: 91 MMHG — HIGH (ref 25–45)
POTASSIUM SERPL-MCNC: 4.5 MMOL/L — SIGNIFICANT CHANGE UP (ref 3.5–5.3)
POTASSIUM SERPL-SCNC: 4.5 MMOL/L — SIGNIFICANT CHANGE UP (ref 3.5–5.3)
PROCALCITONIN SERPL-MCNC: <0.05 — SIGNIFICANT CHANGE UP (ref 0–0.04)
PROT SERPL-MCNC: 6.1 G/DL — SIGNIFICANT CHANGE UP (ref 6–8.3)
PROTHROM AB SERPL-ACNC: 12.2 SEC — SIGNIFICANT CHANGE UP (ref 10–12.9)
RBC # BLD: 4 M/UL — LOW (ref 4.2–5.8)
RBC # FLD: 12.6 % — SIGNIFICANT CHANGE UP (ref 10.3–14.5)
SAO2 % BLDV: 97 % — HIGH (ref 67–88)
SODIUM SERPL-SCNC: 140 MMOL/L — SIGNIFICANT CHANGE UP (ref 135–145)
WBC # BLD: 9.99 K/UL — SIGNIFICANT CHANGE UP (ref 3.8–10.5)
WBC # FLD AUTO: 9.99 K/UL — SIGNIFICANT CHANGE UP (ref 3.8–10.5)

## 2019-05-26 PROCEDURE — 99233 SBSQ HOSP IP/OBS HIGH 50: CPT

## 2019-05-26 RX ADMIN — Medication 10: at 21:44

## 2019-05-26 RX ADMIN — Medication 3 MILLILITER(S): at 20:32

## 2019-05-26 RX ADMIN — AZITHROMYCIN 500 MILLIGRAM(S): 500 TABLET, FILM COATED ORAL at 22:48

## 2019-05-26 RX ADMIN — Medication 40 MILLIGRAM(S): at 00:46

## 2019-05-26 RX ADMIN — Medication 4: at 17:54

## 2019-05-26 RX ADMIN — PANTOPRAZOLE SODIUM 40 MILLIGRAM(S): 20 TABLET, DELAYED RELEASE ORAL at 06:46

## 2019-05-26 RX ADMIN — Medication 81 MILLIGRAM(S): at 11:43

## 2019-05-26 RX ADMIN — ENOXAPARIN SODIUM 40 MILLIGRAM(S): 100 INJECTION SUBCUTANEOUS at 22:48

## 2019-05-26 RX ADMIN — TAMSULOSIN HYDROCHLORIDE 0.4 MILLIGRAM(S): 0.4 CAPSULE ORAL at 22:48

## 2019-05-26 RX ADMIN — LOSARTAN POTASSIUM 50 MILLIGRAM(S): 100 TABLET, FILM COATED ORAL at 06:46

## 2019-05-26 RX ADMIN — Medication 2: at 12:51

## 2019-05-26 RX ADMIN — TIOTROPIUM BROMIDE 1 CAPSULE(S): 18 CAPSULE ORAL; RESPIRATORY (INHALATION) at 06:45

## 2019-05-26 RX ADMIN — Medication 25 MILLIGRAM(S): at 06:46

## 2019-05-26 RX ADMIN — Medication 3 MILLILITER(S): at 07:42

## 2019-05-26 RX ADMIN — Medication 3 MILLILITER(S): at 13:45

## 2019-05-26 RX ADMIN — Medication 3 MILLILITER(S): at 00:17

## 2019-05-26 RX ADMIN — BUDESONIDE AND FORMOTEROL FUMARATE DIHYDRATE 2 PUFF(S): 160; 4.5 AEROSOL RESPIRATORY (INHALATION) at 06:46

## 2019-05-26 RX ADMIN — INSULIN GLARGINE 10 UNIT(S): 100 INJECTION, SOLUTION SUBCUTANEOUS at 22:00

## 2019-05-26 RX ADMIN — ATORVASTATIN CALCIUM 40 MILLIGRAM(S): 80 TABLET, FILM COATED ORAL at 22:48

## 2019-05-26 RX ADMIN — Medication 40 MILLIGRAM(S): at 12:55

## 2019-05-26 RX ADMIN — BUDESONIDE AND FORMOTEROL FUMARATE DIHYDRATE 2 PUFF(S): 160; 4.5 AEROSOL RESPIRATORY (INHALATION) at 22:48

## 2019-05-26 RX ADMIN — Medication 4: at 08:21

## 2019-05-26 RX ADMIN — Medication 40 MILLIGRAM(S): at 22:52

## 2019-05-26 RX ADMIN — ENOXAPARIN SODIUM 40 MILLIGRAM(S): 100 INJECTION SUBCUTANEOUS at 00:46

## 2019-05-26 RX ADMIN — Medication 1 TABLET(S): at 11:43

## 2019-05-26 RX ADMIN — CLOPIDOGREL BISULFATE 75 MILLIGRAM(S): 75 TABLET, FILM COATED ORAL at 11:43

## 2019-05-26 NOTE — PROGRESS NOTE ADULT - SUBJECTIVE AND OBJECTIVE BOX
Patient was examined Chart reviewed Detailed note will be inserted below after going over latest data Meanwhile please refer to my previous notes for Pulmonary/Critical Care assessment recommendations Patient was examined Chart reviewed Detailed note will be inserted below after going over latest data Meanwhile please refer to my previous notes for Pulmonary/Critical Care assessment recommendations     COMMODORE TURNER [facility] P [phone]  1940 DOA 2019 DR BJ KNUTSON   ALLERGY Shellfish  CONTACT Sp Amira EATON    Initial evaluation/Pulmonary Critical Care consultation requested on 2019    by Dr Knutson   from Dr Dejesus   Patient examined chart reviewed    HOSPITAL ADMISSION   PATIENT CAME  FROM (if information available)        TYPE OF VISIT      Subsequent pulmonary followup      REASON FOR VISIT  For list of problems evaluated/addressed, please see problem list in the note below     PATIENT COMMODORE TURNER [facility] P [phone]  1940 DOA 2019 DR BJ KNUTSON     VITALS/LABS      2019 afeb 66 105/65 18 100%   2019 5a 18/8/.25  2019 W 9.9 Hb 11.1 Plt 274 INR 1 Na 140 K 4.5 CO2 36 Cr 1.2   2019 vbg pH 739   2019 pc n      PATIENT COMMODALAN TURNER [facility] P 868 018  1940 DOA 2019 DR BJ KNUTSON     PROBLEMS     AC HYPERCAPNIC RESP FAILURE   2019 vbg pH 739  2019 6a 18/8/.25 739/51/91   2019 6p 3l 726/74/92  COPD ex   RESP TRACT INFECTION   W - W 7.4-6.3   X 5/24/2019 CXR no infiltr  P   2019 pc n   M  flu ab n rsv n   M 2019 rvp n   CAD   E -2019 Tr 1 n.2   RO DVT  D 2019 V duplex n   RO CHF  3/20/2019 echo ef 55% paradx motion septum prior cardiac surgery dd1 pasp 29      PATIENT  COMMBRIANNE TURNER [facility] P [phone]  1940 DOA 2019 DR BJ KNUTSON     MEDICATIONS     CAD   asa 81 (2019)   Plavix 75 (2019)   atorvastat 40 (2019)   Metoprolol 25.2 (2019)   ANTIBIO  azithro (2019)     COPD   duoneb (2019)   solumed 40.3 (2019)   DVT P   Lvnx 40 (2019  DM   iss (2019)   BPH   Tamsulosin .4 (2019)       GLOBAL ISSUE/BEST PRACTICE:      PROBLEM: HOB elevation:   y            PROBLEM: Stress ulcer proph:    na                      PROBLEM: VTE prophylaxis:   Lvnx 40 (2019)     PROBLEM: Glycemic control:    na  PROBLEM: Nutrition: DASH (2019)     PROBLEM: Advanced directive: na     PROBLEM: Allergies:  na    REVIEW OF SYMPTOMS     NOTE Changess if any  in ROS and PE are updated in daily HOSPITAL COURSE below      Able to give ROS  Yes     RELIABLE No   CONSTITUTIONAL Weakness Yes  Chills No Vision changes No  ENDOCRINE No unexplained hair loss No heat or cold intolerance    ALLERGY No hives  Sore throat No   RESP Coughing blood no  Shortness of breath YES   NEURO No Headache  Confusion Pain neck No   CARDIAC No Chest pain No Palpitations   GI No Pain abdomen NO   Vomiting NO     PHYSICAL EXAM    HEENT Unremarkable PERRLA atraumatic   RESP Fair air entry EXP prolonged    Harsh breath sound Resp distres mild   CARDIAC S1 S2 No S3     NO JVD    ABDOMEN SOFT BS PRESENT NOT DISTENDED No hepatosplenomegaly PEDAL EDEMA present No calf tenderness  NO rash   GENERAL Not TOXIC looking

## 2019-05-26 NOTE — PROGRESS NOTE ADULT - PROBLEM SELECTOR PLAN 1
-ABG showing hypercapnic respiratory failure  -Continue Solumedrol 40mg q12h.   -Continue duonebs q6h standing, and elevated head of bed  -Continue Azithromycin  -Pulm consult (Dr Dejesus) following  -Continuous pulse ox monitoring

## 2019-05-26 NOTE — PROGRESS NOTE ADULT - SUBJECTIVE AND OBJECTIVE BOX
ADMISSION HPI:  Patient is a 79/M with PMHx significant for HTN, COPD (On home O2 2L and BIPAP at while sleeping regardless of time of day), CAD w/ 3v CABG, HLD, DM2 (on Metformin), hx Hypercapnic Resp Failure/Cardiac Arrest requiring intubation (6/'18), Presents to the ED for increased SOB since yesterday morning. Per wife, patient uses BiPAP when sleeping as he is a chronic CO2 retainer 2/2 COPD, denies hx of sleep apnea. Patient  and wife attempted to manage symptoms at home, keeping patient on BiPAP intermittently throughout the day and giving nebulizers PRN with some symptomatic relief, however when patient woke this morning nebs giving no relief. Unclear if any triggers for episode, but wife (Sania) reports that patients bedroom has been very warm. Reports some increased coughing frequency, but maintains coughs are dry, denies any mucus production. Of note, patient admitted to [facility] 4/23/19 for SOB, and again 4/4 prior to that.  No fever, chills, n/v/d, chest pain, lightheadedness.     ED Vitals: T 98.0F HR 96 /85 RR 16 SpO2 100% on 3L NC. Labs indicative of mild anemia (hb 12.3), cmp wnl, troponin negative x 1. ABG showing hypercapnic respiratory acidosis, at which point patient was placed on BiPAP. CXR unremarkable, s/p CABG. EKG 95 NSR    Patient seen and evaluated at the bedside, seen sitting up in bed with NC 3L in place, with increased work of breathing. Patient unable to speak in complete sentences due to SOB, wife (Sania) at the bedside, providing additional history for the patient. Denies any chest pain, fever, chills, URI symptoms, n/v/d. (24 May 2019 19:02)    INTERVAL HPI:  Patient seen and examined. Has shortness of breath and wheezing today. No chest pain or palpitations.     REVIEW OF SYSTEMS:    CONSTITUTIONAL: No weakness, fevers, or chills  EYES/ENT: No visual changes, no throat pain   RESPIRATORY: No cough, wheezing, hemoptysis; +shortness of breath  CARDIOVASCULAR: No chest pain or palpitations  GASTROINTESTINAL: No abdominal pain, nausea, vomiting, or diarrhea  GENITOURINARY: No dysuria, frequency or hematuria  NEUROLOGICAL: No dizziness, numbness, or weakness  SKIN: No itching, burning, rashes, or lesions   All other review of systems is negative unless indicated above.    VITAL SIGNS:  Vital Signs Last 24 Hrs  T(C): 36.6 (05-25-19 @ 14:13), Max: 36.7 (05-24-19 @ 17:00)  T(F): 97.9 (05-25-19 @ 14:13), Max: 98 (05-24-19 @ 17:00)  HR: 87 (05-25-19 @ 14:13) (80 - 103)  BP: 126/72 (05-25-19 @ 14:13) (102/70 - 162/73)  BP(mean): --  RR: 18 (05-25-19 @ 14:13) (15 - 18)  SpO2: 99% (05-25-19 @ 14:13) (98% - 100%)      PHYSICAL EXAM:     GENERAL: no acute distress  HEENT: NC/AT, EOMI, neck supple, MMM  RESPIRATORY: bilateral inspiratory and expiratory wheeze, no rales  CARDIOVASCULAR: RRR, no murmurs, gallops, rubs  ABDOMINAL: soft, non-tender, non-distended, positive bowel sounds   EXTREMITIES: no clubbing, cyanosis, or edema  NEUROLOGICAL: alert and oriented x 3, non-focal  SKIN: warm, dry, intact  MUSCULOSKELETAL: no gross joint deformity                        11.1   9.99  )-----------( 274      ( 26 May 2019 07:15 )             35.7   05-26    140  |  101  |  20  ----------------------------<  214<H>  4.5   |  36<H>  |  1.20    Ca    9.3      26 May 2019 07:15  Phos  2.2     05-25    TPro  6.1  /  Alb  3.3  /  TBili  0.3  /  DBili  x   /  AST  9<L>  /  ALT  17  /  AlkPhos  79  05-26         CAPILLARY BLOOD GLUCOSE  POCT Blood Glucose.: 157 mg/dL (26 May 2019 11:59)  POCT Blood Glucose.: 201 mg/dL (26 May 2019 08:08)  POCT Blood Glucose.: 176 mg/dL (25 May 2019 21:42)  POCT Blood Glucose.: 281 mg/dL (25 May 2019 16:56)      MEDICATIONS  (STANDING):  ALBUTerol/ipratropium for Nebulization. 3 milliLiter(s) Nebulizer every 6 hours  aspirin enteric coated 81 milliGRAM(s) Oral daily  atorvastatin 40 milliGRAM(s) Oral at bedtime  azithromycin   Tablet 500 milliGRAM(s) Oral every 24 hours  buDESOnide 160 MICROgram(s)/formoterol 4.5 MICROgram(s) Inhaler 2 Puff(s) Inhalation two times a day  clopidogrel Tablet 75 milliGRAM(s) Oral daily  dextrose 5%. 1000 milliLiter(s) (50 mL/Hr) IV Continuous <Continuous>  dextrose 50% Injectable 12.5 Gram(s) IV Push once  dextrose 50% Injectable 25 Gram(s) IV Push once  dextrose 50% Injectable 25 Gram(s) IV Push once  enoxaparin Injectable 40 milliGRAM(s) SubCutaneous every 24 hours  insulin glargine Injectable (LANTUS) 10 Unit(s) SubCutaneous at bedtime  insulin lispro (HumaLOG) corrective regimen sliding scale   SubCutaneous Before meals and at bedtime  losartan 50 milliGRAM(s) Oral daily  methylPREDNISolone sodium succinate Injectable 40 milliGRAM(s) IV Push every 12 hours  metoprolol tartrate 25 milliGRAM(s) Oral two times a day  multivitamin 1 Tablet(s) Oral daily  pantoprazole    Tablet 40 milliGRAM(s) Oral before breakfast  tamsulosin 0.4 milliGRAM(s) Oral at bedtime  tiotropium 18 MICROgram(s) Capsule 1 Capsule(s) Inhalation daily    MEDICATIONS  (PRN):  dextrose 40% Gel 15 Gram(s) Oral once PRN Blood Glucose LESS THAN 70 milliGRAM(s)/deciliter  glucagon  Injectable 1 milliGRAM(s) IntraMuscular once PRN Glucose LESS THAN 70 milligrams/deciliter

## 2019-05-27 ENCOUNTER — TRANSCRIPTION ENCOUNTER (OUTPATIENT)
Age: 79
End: 2019-05-27

## 2019-05-27 VITALS — OXYGEN SATURATION: 92 %

## 2019-05-27 LAB
ANION GAP SERPL CALC-SCNC: 3 MMOL/L — LOW (ref 5–17)
BUN SERPL-MCNC: 19 MG/DL — SIGNIFICANT CHANGE UP (ref 7–23)
CALCIUM SERPL-MCNC: 9.2 MG/DL — SIGNIFICANT CHANGE UP (ref 8.5–10.1)
CHLORIDE SERPL-SCNC: 105 MMOL/L — SIGNIFICANT CHANGE UP (ref 96–108)
CO2 SERPL-SCNC: 36 MMOL/L — HIGH (ref 22–31)
CREAT SERPL-MCNC: 1.1 MG/DL — SIGNIFICANT CHANGE UP (ref 0.5–1.3)
GLUCOSE SERPL-MCNC: 129 MG/DL — HIGH (ref 70–99)
HCT VFR BLD CALC: 37.4 % — LOW (ref 39–50)
HGB BLD-MCNC: 11.4 G/DL — LOW (ref 13–17)
MCHC RBC-ENTMCNC: 27.3 PG — SIGNIFICANT CHANGE UP (ref 27–34)
MCHC RBC-ENTMCNC: 30.5 GM/DL — LOW (ref 32–36)
MCV RBC AUTO: 89.7 FL — SIGNIFICANT CHANGE UP (ref 80–100)
NRBC # BLD: 0 /100 WBCS — SIGNIFICANT CHANGE UP (ref 0–0)
PLATELET # BLD AUTO: 291 K/UL — SIGNIFICANT CHANGE UP (ref 150–400)
POTASSIUM SERPL-MCNC: 4 MMOL/L — SIGNIFICANT CHANGE UP (ref 3.5–5.3)
POTASSIUM SERPL-SCNC: 4 MMOL/L — SIGNIFICANT CHANGE UP (ref 3.5–5.3)
RBC # BLD: 4.17 M/UL — LOW (ref 4.2–5.8)
RBC # FLD: 12.7 % — SIGNIFICANT CHANGE UP (ref 10.3–14.5)
SODIUM SERPL-SCNC: 144 MMOL/L — SIGNIFICANT CHANGE UP (ref 135–145)
WBC # BLD: 8.28 K/UL — SIGNIFICANT CHANGE UP (ref 3.8–10.5)
WBC # FLD AUTO: 8.28 K/UL — SIGNIFICANT CHANGE UP (ref 3.8–10.5)

## 2019-05-27 PROCEDURE — 36415 COLL VENOUS BLD VENIPUNCTURE: CPT

## 2019-05-27 PROCEDURE — 93005 ELECTROCARDIOGRAM TRACING: CPT

## 2019-05-27 PROCEDURE — 87631 RESP VIRUS 3-5 TARGETS: CPT

## 2019-05-27 PROCEDURE — 85610 PROTHROMBIN TIME: CPT

## 2019-05-27 PROCEDURE — 71045 X-RAY EXAM CHEST 1 VIEW: CPT

## 2019-05-27 PROCEDURE — 87798 DETECT AGENT NOS DNA AMP: CPT

## 2019-05-27 PROCEDURE — 94664 DEMO&/EVAL PT USE INHALER: CPT

## 2019-05-27 PROCEDURE — 82962 GLUCOSE BLOOD TEST: CPT

## 2019-05-27 PROCEDURE — 94760 N-INVAS EAR/PLS OXIMETRY 1: CPT

## 2019-05-27 PROCEDURE — 84484 ASSAY OF TROPONIN QUANT: CPT

## 2019-05-27 PROCEDURE — 99239 HOSP IP/OBS DSCHRG MGMT >30: CPT

## 2019-05-27 PROCEDURE — 83036 HEMOGLOBIN GLYCOSYLATED A1C: CPT

## 2019-05-27 PROCEDURE — 87581 M.PNEUMON DNA AMP PROBE: CPT

## 2019-05-27 PROCEDURE — 93970 EXTREMITY STUDY: CPT

## 2019-05-27 PROCEDURE — 82803 BLOOD GASES ANY COMBINATION: CPT

## 2019-05-27 PROCEDURE — 80048 BASIC METABOLIC PNL TOTAL CA: CPT

## 2019-05-27 PROCEDURE — 99231 SBSQ HOSP IP/OBS SF/LOW 25: CPT

## 2019-05-27 PROCEDURE — 99221 1ST HOSP IP/OBS SF/LOW 40: CPT

## 2019-05-27 PROCEDURE — 85027 COMPLETE CBC AUTOMATED: CPT

## 2019-05-27 PROCEDURE — 84145 PROCALCITONIN (PCT): CPT

## 2019-05-27 PROCEDURE — 84100 ASSAY OF PHOSPHORUS: CPT

## 2019-05-27 PROCEDURE — 80053 COMPREHEN METABOLIC PANEL: CPT

## 2019-05-27 PROCEDURE — 94640 AIRWAY INHALATION TREATMENT: CPT

## 2019-05-27 PROCEDURE — 87486 CHLMYD PNEUM DNA AMP PROBE: CPT

## 2019-05-27 PROCEDURE — 87040 BLOOD CULTURE FOR BACTERIA: CPT

## 2019-05-27 PROCEDURE — 94660 CPAP INITIATION&MGMT: CPT

## 2019-05-27 PROCEDURE — 99291 CRITICAL CARE FIRST HOUR: CPT | Mod: 25

## 2019-05-27 PROCEDURE — 87633 RESP VIRUS 12-25 TARGETS: CPT

## 2019-05-27 RX ORDER — AZITHROMYCIN 500 MG/1
1 TABLET, FILM COATED ORAL
Qty: 3 | Refills: 0
Start: 2019-05-27 | End: 2019-05-29

## 2019-05-27 RX ADMIN — Medication 81 MILLIGRAM(S): at 11:13

## 2019-05-27 RX ADMIN — Medication 25 MILLIGRAM(S): at 05:44

## 2019-05-27 RX ADMIN — LOSARTAN POTASSIUM 50 MILLIGRAM(S): 100 TABLET, FILM COATED ORAL at 05:44

## 2019-05-27 RX ADMIN — Medication 1 TABLET(S): at 11:13

## 2019-05-27 RX ADMIN — Medication 40 MILLIGRAM(S): at 12:29

## 2019-05-27 RX ADMIN — CLOPIDOGREL BISULFATE 75 MILLIGRAM(S): 75 TABLET, FILM COATED ORAL at 11:13

## 2019-05-27 RX ADMIN — Medication 3 MILLILITER(S): at 13:42

## 2019-05-27 RX ADMIN — PANTOPRAZOLE SODIUM 40 MILLIGRAM(S): 20 TABLET, DELAYED RELEASE ORAL at 05:44

## 2019-05-27 RX ADMIN — BUDESONIDE AND FORMOTEROL FUMARATE DIHYDRATE 2 PUFF(S): 160; 4.5 AEROSOL RESPIRATORY (INHALATION) at 05:46

## 2019-05-27 RX ADMIN — TIOTROPIUM BROMIDE 1 CAPSULE(S): 18 CAPSULE ORAL; RESPIRATORY (INHALATION) at 05:44

## 2019-05-27 RX ADMIN — Medication 3 MILLILITER(S): at 07:40

## 2019-05-27 RX ADMIN — Medication 3 MILLILITER(S): at 02:32

## 2019-05-27 RX ADMIN — Medication 2: at 12:29

## 2019-05-27 NOTE — DISCHARGE NOTE PROVIDER - CARE PROVIDER_API CALL
Arun Dejesus)  Critical Care Medicine; Internal Medicine; Pulmonary Disease  [address] [place] [address]  Phone: [phone]  Fax: [phone]  Follow Up Time:     Sarah Avila)  [place] [address]  Phone: [phone]  Fax: [phone]  Follow Up Time:

## 2019-05-27 NOTE — PROGRESS NOTE ADULT - SUBJECTIVE AND OBJECTIVE BOX
Patient was examined Chart reviewed Detailed note will be inserted below after going over latest data Meanwhile please refer to my previous notes for Pulmonary/Critical Care assessment recommendations Patient was examined Chart reviewed Detailed note will be inserted below after going over latest data Meanwhile please refer to my previous notes for Pulmonary/Critical Care assessment recommendations       COMMODORE TURNER [facility] P [phone]  1940 DOA 2019 DR BJ KNUTSON   ALLERGY [place]  CONTACT Sp Amira EATON    Initial evaluation/Pulmonary Critical Care consultation requested on 2019    by Dr Knutson   from Dr Dejesus   Patient examined chart reviewed    HOSPITAL ADMISSION   PATIENT CAME  FROM (if information available)        TYPE OF VISIT      Subsequent pulmonary followup      REASON FOR VISIT  For list of problems evaluated/addressed, please see problem list in the note below     PATIENT COMMODORE TURNER [facility] P [phone]  1940 DOA 2019 DR BJ KNUTSON     VITALS/LABS        2019 afeb 97 120/70 18 98%   2019 182019 w 8.2 Hb 11.4 Plt 291 Na 144 K 4 CO2 36 Cr 1.1     PATIENT  COMMODORE TURNER [facility] P [phone]  1940 DOA 2019 DR BJ KNUTSON     MEDICATIONS     CAD   asa 81 (2019)   Plavix 75 (2019)   atorvastat 40 (2019)   Metoprolol 25.2 (2019)   ANTIBIO  azithro (2019)     COPD   duoneb (2019)   solumed 40.3 (2019)   DVT P   Lvnx 40 (2019  DM   iss (2019)   BPH   Tamsulosin .4 (2019)       GLOBAL ISSUE/BEST PRACTICE:      PROBLEM: HOB elevation:   y            PROBLEM: Stress ulcer proph:    na                      PROBLEM: VTE prophylaxis:   Lvnx 40 (2019)     PROBLEM: Glycemic control:    na  PROBLEM: Nutrition: DASH (2019)     PROBLEM: Advanced directive: na     PROBLEM: Allergies:  na    REVIEW OF SYMPTOMS     NOTE Changess if any  in ROS and PE are updated in daily HOSPITAL COURSE below      Able to give ROS  Yes     RELIABLE No   CONSTITUTIONAL Weakness Yes  Chills No Vision changes No  ENDOCRINE No unexplained hair loss No heat or cold intolerance    ALLERGY No hives  Sore throat No   RESP Coughing blood no  Shortness of breath YES   NEURO No Headache  Confusion Pain neck No   CARDIAC No Chest pain No Palpitations   GI No Pain abdomen NO   Vomiting NO     PHYSICAL EXAM    HEENT Unremarkable PERRLA atraumatic   RESP Fair air entry EXP prolonged    Harsh breath sound Resp distres mild   CARDIAC S1 S2 No S3     NO JVD    ABDOMEN SOFT BS PRESENT NOT DISTENDED No hepatosplenomegaly PEDAL EDEMA present No calf tenderness  NO rash   GENERAL Not TOXIC

## 2019-05-27 NOTE — DISCHARGE NOTE PROVIDER - HOSPITAL COURSE
ADMISSION HPI:    Patient is a 79/M with PMHx significant for HTN, COPD (On home O2 2L and BIPAP at while sleeping regardless of time of day), CAD w/ 3v CABG, HLD, DM2 (on Metformin), hx Hypercapnic Resp Failure/Cardiac Arrest requiring intubation (6/'18), Presents to the ED for increased SOB since yesterday morning. Per wife, patient uses BiPAP when sleeping as he is a chronic CO2 retainer 2/2 COPD, denies hx of sleep apnea. Patient  and wife attempted to manage symptoms at home, keeping patient on BiPAP intermittently throughout the day and giving nebulizers PRN with some symptomatic relief, however when patient woke this morning nebs giving no relief. Unclear if any triggers for episode, but wife (Sania) reports that patients bedroom has been very warm. Reports some increased coughing frequency, but maintains coughs are dry, denies any mucus production. Of note, patient admitted to [facility] 4/23/19 for SOB, and again 4/4 prior to that.  No fever, chills, n/v/d, chest pain, lightheadedness.         ABG showing hypercapnic respiratory acidosis, at which point patient was placed on BiPAP. CXR unremarkable, s/p CABG. EKG 95 NSR.        Admitted for acute hypoxic/hypercarbic respiratory failure due to COPD exacerbation. He was treated with supplemental oxygen/BiPAP (which he uses at home), IV steroids, Azithromycin, nebulizers. He was evaluated by Pulm Dr. Dejesus. His symptoms gradually improved. His steroids were tapered. By day of discharge, he was able to ambulate in hallway without any shortness of breath. He was cleared by pulm for discharge home and close outpatient follow-up.         He was seen and examined on day fo discharge:         VITAL SIGNS:    Vital Signs Last 24 Hrs    T(C): 36.9 (05-27-19 @ 13:06), Max: 36.9 (05-26-19 @ 20:22)    T(F): 98.4 (05-27-19 @ 13:06), Max: 98.4 (05-26-19 @ 20:22)    HR: 86 (05-27-19 @ 13:43) (83 - 97)    BP: 121/73 (05-27-19 @ 13:06) (99/60 - 142/77)    BP(mean): --    RR: 18 (05-27-19 @ 13:06) (18 - 19)    SpO2: 92% (05-27-19 @ 13:43) (92% - 99%)            PHYSICAL EXAM:         GENERAL: no acute distress    HEENT: NC/AT, EOMI, neck supple, MMM    RESPIRATORY: good air entry bilaterally, few left sided scattered wheezes, no rhonchi or rales    CARDIOVASCULAR: RRR, no murmurs, gallops, rubs    ABDOMINAL: soft, non-tender, non-distended, positive bowel sounds     EXTREMITIES: no clubbing, cyanosis, or edema    NEUROLOGICAL: alert and oriented x 3, non-focal    SKIN: warm, dry, intact    MUSCULOSKELETAL: no gross joint deformity        Patient stable for discharge and outpatient follow-up. He will complete a steroid taper at home as well as a short course of Azithromycin. He will resume his home regimen for COPD.        Time spent: 40 minutes.

## 2019-05-27 NOTE — PROGRESS NOTE ADULT - ASSESSMENT
Patient is a 79/M with PMHx significant for HTN, COPD (On home O2 2L and BIPAP at while sleeping regardless of time of day), CAD w/ 3v CABG, HLD, DM2 (on Metformin), hx Hypercapnic Resp Failure/Cardiac Arrest requiring intubation (6/'18), Presents to the ED for increased SOB since yesterday morning. Attempted to alleviate symptoms with biPAP and nebs at home, but became increasingly SOB this AM and came to ED via ambulance. Admit to F for management of COPD exacerbation.
Patient is a 79/M with PMHx significant for HTN, COPD (On home O2 2L and BIPAP at while sleeping regardless of time of day), CAD w/ 3v CABG, HLD, DM2 (on Metformin), hx Hypercapnic Resp Failure/Cardiac Arrest requiring intubation (6/'18), Presents to the ED for increased SOB since yesterday morning. Attempted to alleviate symptoms with biPAP and nebs at home, but became increasingly SOB this AM and came to ED via ambulance. Admit to F for management of COPD exacerbation.
PATIENT COMMODALAN TURNER [facility] P [phone]  1940 DOA 2019 DR BJ HILLIARD     ASSESSMENT/RECOMMENDATIONS (A/R)     AC HYPERCAPNIC RESP FAILURE   Monitor abg joelle determine need to escalate to invasive tushar   niv ordered  COPD ex   BD steroids laba lama   BRONCHITIS   azithro started   CAD   On asa plvx bb  RO DVT  check v duplex  RO CHF  3/20/2019 echo ef 55% paradx motion septum prior cardiac surgery dd1 pasp 29                COMMODORE YUE [facility] P 868 018  1940 DOA 2019 DR BJ HILLIARD          DISPOSITION/PLAN  Gold D COPD with recurrent frequent hos HO CAC 2018 HO intubation 2019 admitted 2019 with copd ex   Rx with bd steroids abio Monitor abg on niv to see need for mv   2019 Improved with niv  2019 DC planning in 24-48   2019 Had wheezing today defer dc x 24-48 h loi Stephenson     TIME SPENT Over 25 minutes aggregate care time spent on encounter; activities included   direct patient care, counseling and/or coordinating care reviewing notes, lab data/ imaging , discussion with multidisciplinary team/ patient  /family. Risks, benefits, alternatives  discussed in detail
PATIENT COMMODALAN TURNER [facility] P 868 018  1940 DOA 2019 DR BJ HILLIARD     ASSESSMENT/RECOMMENDATIONS (A/R)     AC HYPERCAPNIC RESP FAILURE   Monitor abg joelle determine need to escalate to invasive tushar   niv ordered  COPD ex   BD steroids laba lama   BRONCHITIS   azithro started   CAD   On asa plvx bb  RO DVT  check v duplex  RO CHF  3/20/2019 echo ef 55% paradx motion septum prior cardiac surgery dd1 pasp 29                COMMODORE YUE [facility] P 868 018  1940 DOA 2019 DR BJ HILLIARD          DISPOSITION/PLAN  Gold D COPD with recurrent frequent hos HO CAC 2018 HO intubation 2019 admitted 2019 with copd ex   Rx with bd steroids abio Monitor abg on niv to see need for mv   2019 Improved with niv  2019 DC plai=nning in 24-48     TIME SPENT Over 25 minutes aggregate care time spent on encounter; activities included   direct patient care, counseling and/or coordinating care reviewing notes, lab data/ imaging , discussion with multidisciplinary team/ patient  /family. Risks, benefits, alternatives  discussed in detail.
PATIENT COMMODORE YUE [facility] P [phone]  1940 DOA 2019 DR BJ HILLIARD     ASSESSMENT/RECOMMENDATIONS (A/R)     AC HYPERCAPNIC RESP FAILURE   Monitor abg joelle determine need to escalate to invasive tushar   niv ordered  COPD ex   BD steroids laba lama   BRONCHITIS   azithro started   CAD   On asa plvx bb  RO DVT  check v duplex  RO CHF  3/20/2019 echo ef 55% paradx motion septum prior cardiac surgery dd1 pasp 29                COMMODORE YUE [facility] P [phone]  1940 DOA 2019 DR BJ HILLIARD          DISPOSITION/PLAN  Gold D COPD with recurrent frequent hos HO CAC 2018 HO intubation 2019 admitted 2019 with copd ex   Rx with bd steroids abio Monitor abg on niv to see need for mv   2019 Improved with niv  2019 DC planning in 2448   2019 Had wheezing today defer dc x 24-48 h dw Dr Stephenson     TIME SPENT Over 25 minutes aggregate care time spent on encounter; activities included   direct patient care, counseling and/or coordinating care reviewing notes, lab data/ imaging , discussion with multidisciplinary team/ patient  /family. Risks, benefits, alternatives  discussed in detai

## 2019-05-27 NOTE — DISCHARGE NOTE PROVIDER - NSDCCPCAREPLAN_GEN_ALL_CORE_FT
PRINCIPAL DISCHARGE DIAGNOSIS  Diagnosis: COPD (chronic obstructive pulmonary disease)  Assessment and Plan of Treatment: with exacerbation.  Complete course of antibiotic (Azithromycin) as prescribed.   Complete course of steroids as prescribed.  Resume your home medication regimen.      SECONDARY DISCHARGE DIAGNOSES  Diagnosis: Diabetes Mellitus, Type II  Assessment and Plan of Treatment: Resume home medication regimen as prescribed. Follow up with your PCP.    Diagnosis: Hypertension  Assessment and Plan of Treatment: Resume home medication regimen as prescribed. Follow up with your PCP.    Diagnosis: CAD (Coronary Artery Disease)  Assessment and Plan of Treatment: Resume home medication regimen as prescribed. Follow up with your PCP.    Diagnosis: Dyslipidemia  Assessment and Plan of Treatment: Resume home medication regimen as prescribed. Follow up with your PCP.

## 2019-05-27 NOTE — DISCHARGE NOTE NURSING/CASE MANAGEMENT/SOCIAL WORK - NSDCDPATPORTLINK_GEN_ALL_CORE
You can access the [facility], offered by [facility], by registering with the following website: [email or web address]

## 2019-05-30 LAB
CULTURE RESULTS: SIGNIFICANT CHANGE UP
SPECIMEN SOURCE: SIGNIFICANT CHANGE UP

## 2019-06-03 ENCOUNTER — APPOINTMENT (OUTPATIENT)
Dept: VASCULAR SURGERY | Facility: CLINIC | Age: 79
End: 2019-06-03
Payer: MEDICARE

## 2019-06-03 PROCEDURE — 93926 LOWER EXTREMITY STUDY: CPT

## 2019-06-03 PROCEDURE — 99213 OFFICE O/P EST LOW 20 MIN: CPT

## 2019-06-03 PROCEDURE — 93978 VASCULAR STUDY: CPT

## 2019-06-03 NOTE — HISTORY OF PRESENT ILLNESS
[FreeTextEntry1] : 77 yo male with history of dm, htn, copd, pad s/p b/l iliac stents and right lower extremity sfa stent.  pt reports left lower extremity stable claudications of less then 1/4 of a mile of the left lower extremity.  pt states that after a few blocks he has to stop to rest.\par pt denies any rest pain of the left lower extremity

## 2019-06-03 NOTE — ASSESSMENT
[FreeTextEntry1] : 77 yo male with history of  dm, htn, copd, pad s/p b/l iliac stents and right lower extremity sfa stent presents for follow up with persistant claudication of the left worse then the right .  duplex shows patent right sfa stent and b/l iliac stents with stable 50% stenosis \par f/u in 3 months.

## 2019-06-03 NOTE — PHYSICAL EXAM
[JVD] : no jugular venous distention  [Normal Heart Sounds] : normal heart sounds [Normal Breath Sounds] : Normal breath sounds [2+] : left 2+ [Varicose Veins Of Lower Extremities] : not present [Abdomen Masses] : No abdominal masses [Ankle Swelling (On Exam)] : not present [] : not present [Skin Ulcer] : no ulcer [Oriented to Place] : oriented to place

## 2019-06-04 NOTE — DIETITIAN INITIAL EVALUATION ADULT. - PROBLEM SELECTOR PROBLEM 5
BF rx given. Long hx of borderline IOP.; 's Notes: Daughter Dolores Stevens in [facility]. Dyslipidemia

## 2019-06-12 ENCOUNTER — INPATIENT (INPATIENT)
Facility: HOSPITAL | Age: 79
LOS: 1 days | Discharge: HOME | DRG: 190 | End: 2019-06-14
Attending: INTERNAL MEDICINE | Admitting: INTERNAL MEDICINE
Payer: COMMERCIAL

## 2019-06-12 VITALS — WEIGHT: 207.01 LBS | HEIGHT: 71 IN

## 2019-06-12 DIAGNOSIS — T14.8XXA OTHER INJURY OF UNSPECIFIED BODY REGION, INITIAL ENCOUNTER: Chronic | ICD-10-CM

## 2019-06-12 DIAGNOSIS — J44.1 CHRONIC OBSTRUCTIVE PULMONARY DISEASE WITH (ACUTE) EXACERBATION: ICD-10-CM

## 2019-06-12 LAB
ANION GAP SERPL CALC-SCNC: 5 MMOL/L — SIGNIFICANT CHANGE UP (ref 5–17)
APTT BLD: 37.9 SEC — HIGH (ref 28.5–37)
BASE EXCESS BLDA CALC-SCNC: 3.2 MMOL/L — HIGH (ref -2–2)
BLOOD GAS COMMENTS ARTERIAL: SIGNIFICANT CHANGE UP
BLOOD GAS COMMENTS ARTERIAL: SIGNIFICANT CHANGE UP
BUN SERPL-MCNC: 10 MG/DL — SIGNIFICANT CHANGE UP (ref 7–23)
CALCIUM SERPL-MCNC: 9.5 MG/DL — SIGNIFICANT CHANGE UP (ref 8.5–10.1)
CHLORIDE SERPL-SCNC: 107 MMOL/L — SIGNIFICANT CHANGE UP (ref 96–108)
CO2 SERPL-SCNC: 29 MMOL/L — SIGNIFICANT CHANGE UP (ref 22–31)
CREAT SERPL-MCNC: 1 MG/DL — SIGNIFICANT CHANGE UP (ref 0.5–1.3)
GLUCOSE SERPL-MCNC: 145 MG/DL — HIGH (ref 70–99)
HCO3 BLDA-SCNC: 26 MMOL/L — SIGNIFICANT CHANGE UP (ref 23–27)
HCT VFR BLD CALC: 40.4 % — SIGNIFICANT CHANGE UP (ref 39–50)
HGB BLD-MCNC: 12.4 G/DL — LOW (ref 13–17)
HOROWITZ INDEX BLDA+IHG-RTO: SIGNIFICANT CHANGE UP
INR BLD: 0.95 RATIO — SIGNIFICANT CHANGE UP (ref 0.88–1.16)
MCHC RBC-ENTMCNC: 27.6 PG — SIGNIFICANT CHANGE UP (ref 27–34)
MCHC RBC-ENTMCNC: 30.7 GM/DL — LOW (ref 32–36)
MCV RBC AUTO: 89.8 FL — SIGNIFICANT CHANGE UP (ref 80–100)
NRBC # BLD: 0 /100 WBCS — SIGNIFICANT CHANGE UP (ref 0–0)
PCO2 BLDA: 62 MMHG — HIGH (ref 32–46)
PH BLDA: 7.29 — LOW (ref 7.35–7.45)
PLATELET # BLD AUTO: 276 K/UL — SIGNIFICANT CHANGE UP (ref 150–400)
PO2 BLDA: 86 MMHG — SIGNIFICANT CHANGE UP (ref 74–108)
POTASSIUM SERPL-MCNC: 4.3 MMOL/L — SIGNIFICANT CHANGE UP (ref 3.5–5.3)
POTASSIUM SERPL-SCNC: 4.3 MMOL/L — SIGNIFICANT CHANGE UP (ref 3.5–5.3)
PROTHROM AB SERPL-ACNC: 10.8 SEC — SIGNIFICANT CHANGE UP (ref 10–12.9)
RBC # BLD: 4.5 M/UL — SIGNIFICANT CHANGE UP (ref 4.2–5.8)
RBC # FLD: 13.1 % — SIGNIFICANT CHANGE UP (ref 10.3–14.5)
SAO2 % BLDA: 96 % — SIGNIFICANT CHANGE UP (ref 92–96)
SODIUM SERPL-SCNC: 141 MMOL/L — SIGNIFICANT CHANGE UP (ref 135–145)
WBC # BLD: 9.78 K/UL — SIGNIFICANT CHANGE UP (ref 3.8–10.5)
WBC # FLD AUTO: 9.78 K/UL — SIGNIFICANT CHANGE UP (ref 3.8–10.5)

## 2019-06-12 PROCEDURE — 71045 X-RAY EXAM CHEST 1 VIEW: CPT | Mod: 26

## 2019-06-12 PROCEDURE — 99223 1ST HOSP IP/OBS HIGH 75: CPT

## 2019-06-12 PROCEDURE — 99291 CRITICAL CARE FIRST HOUR: CPT

## 2019-06-12 PROCEDURE — 93010 ELECTROCARDIOGRAM REPORT: CPT

## 2019-06-12 RX ORDER — ALBUTEROL 90 UG/1
2.5 AEROSOL, METERED ORAL
Refills: 0 | Status: COMPLETED | OUTPATIENT
Start: 2019-06-12 | End: 2019-06-12

## 2019-06-12 RX ORDER — IPRATROPIUM/ALBUTEROL SULFATE 18-103MCG
3 AEROSOL WITH ADAPTER (GRAM) INHALATION ONCE
Refills: 0 | Status: COMPLETED | OUTPATIENT
Start: 2019-06-12 | End: 2019-06-12

## 2019-06-12 RX ADMIN — Medication 3 MILLILITER(S): at 21:12

## 2019-06-12 RX ADMIN — ALBUTEROL 2.5 MILLIGRAM(S): 90 AEROSOL, METERED ORAL at 22:25

## 2019-06-12 RX ADMIN — ALBUTEROL 2.5 MILLIGRAM(S): 90 AEROSOL, METERED ORAL at 21:54

## 2019-06-12 RX ADMIN — Medication 125 MILLIGRAM(S): at 21:12

## 2019-06-12 RX ADMIN — ALBUTEROL 2.5 MILLIGRAM(S): 90 AEROSOL, METERED ORAL at 21:34

## 2019-06-12 NOTE — ED PROVIDER NOTE - CONSTITUTIONAL, MLM
normal... Weak appearing tall black male, well nourished, awake, alert, oriented to person, place, time/situation and in mild to respiratory apparent distress.

## 2019-06-12 NOTE — ED PROVIDER NOTE - OBJECTIVE STATEMENT
80 yo black male with H/O CAD (Coronary Artery Disease)    COPD (chronic obstructive pulmonary disease)    Diabetes Mellitus, Type II    Dyslipidemia    Hypertension    Osteomyelitis and   PVD (peripheral vascular disease) who was at his baseline state of health until this morning when he began to experience SOB. Over the course of the day, dyspnea has worsened and feels similar to his previous COPD exacerbations. Patient denies any fever, chills, chest pain, cough, palpitations, nausea, vomiting or diarrhea. Not on steroids at present time. Pulmonary=Dejesus    PCP=[facility]

## 2019-06-12 NOTE — CONSULT NOTE ADULT - SUBJECTIVE AND OBJECTIVE BOX
CC:  Patient is a 79y old  Male who presents with a chief complaint of COPD exacerbation (13 Jun 2019 00:20)      HPI/BRIEF HOSPITAL COURSE: ***    Events last 24 hours: ***    PAST MEDICAL & SURGICAL HISTORY:  PVD (peripheral vascular disease)  Hypertension  COPD (chronic obstructive pulmonary disease)  Osteomyelitis  Dyslipidemia  CAD (Coronary Artery Disease)  Diabetes Mellitus, Type II  Compound fracture: left leg  CABG (Coronary Artery Bypass Graft)    Allergies    No Known Allergies    Intolerances    shellfish (Nausea)    FAMILY HISTORY:  Family history of diabetes mellitus (Sibling)      Review of Systems:  CONSTITUTIONAL: No fever, chills, or fatigue  EYES: No eye pain, visual disturbances, or discharge  ENMT:  No difficulty hearing, tinnitus, vertigo; No sinus or throat pain  NECK: No pain or stiffness  RESPIRATORY: No cough, wheezing, chills or hemoptysis; No shortness of breath  CARDIOVASCULAR: No chest pain, palpitations, dizziness, or leg swelling  GASTROINTESTINAL: No abdominal or epigastric pain. No nausea, vomiting, or hematemesis; No diarrhea or constipation. No melena or hematochezia.  GENITOURINARY: No dysuria, frequency, hematuria, or incontinence  NEUROLOGICAL: No headaches, memory loss, loss of strength, numbness, or tremors  SKIN: No itching, burning, rashes, or lesions   MUSCULOSKELETAL: No joint pain or swelling; No muscle, back, or extremity pain  PSYCHIATRIC: No depression, anxiety, mood swings, or difficulty sleeping      Medications:    metoprolol tartrate 25 milliGRAM(s) Oral two times a day  tamsulosin 0.4 milliGRAM(s) Oral at bedtime  valsartan 80 milliGRAM(s) Oral daily    ALBUTerol/ipratropium for Nebulization 3 milliLiter(s) Nebulizer every 6 hours        aspirin enteric coated 81 milliGRAM(s) Oral daily  clopidogrel Tablet 75 milliGRAM(s) Oral daily  enoxaparin Injectable 40 milliGRAM(s) SubCutaneous daily        atorvastatin 40 milliGRAM(s) Oral at bedtime  dextrose 40% Gel 15 Gram(s) Oral once PRN  dextrose 50% Injectable 12.5 Gram(s) IV Push once  dextrose 50% Injectable 25 Gram(s) IV Push once  dextrose 50% Injectable 25 Gram(s) IV Push once  glucagon  Injectable 1 milliGRAM(s) IntraMuscular once PRN  insulin lispro (HumaLOG) corrective regimen sliding scale   SubCutaneous three times a day before meals  methylPREDNISolone sodium succinate Injectable 40 milliGRAM(s) IV Push every 8 hours    dextrose 5%. 1000 milliLiter(s) IV Continuous <Continuous>                ICU Vital Signs Last 24 Hrs  T(C): 37.1 (12 Jun 2019 20:43), Max: 37.1 (12 Jun 2019 20:43)  T(F): 98.8 (12 Jun 2019 20:43), Max: 98.8 (12 Jun 2019 20:43)  HR: 104 (13 Jun 2019 02:23) (101 - 115)  BP: 135/60 (12 Jun 2019 23:39) (128/58 - 168/92)  BP(mean): --  ABP: --  ABP(mean): --  RR: 18 (12 Jun 2019 23:39) (18 - 22)  SpO2: 98% (13 Jun 2019 02:23) (94% - 100%)    Vital Signs Last 24 Hrs  T(C): 37.1 (12 Jun 2019 20:43), Max: 37.1 (12 Jun 2019 20:43)  T(F): 98.8 (12 Jun 2019 20:43), Max: 98.8 (12 Jun 2019 20:43)  HR: 104 (13 Jun 2019 02:23) (101 - 115)  BP: 135/60 (12 Jun 2019 23:39) (128/58 - 168/92)  BP(mean): --  RR: 18 (12 Jun 2019 23:39) (18 - 22)  SpO2: 98% (13 Jun 2019 02:23) (94% - 100%)    ABG - ( 13 Jun 2019 03:40 )  pH, Arterial: 7.31  pH, Blood: x     /  pCO2: 57    /  pO2: 132   / HCO3: 26    / Base Excess: 2.3   /  SaO2: 99                  I&O's Detail        LABS:                        12.4   9.78  )-----------( 276      ( 12 Jun 2019 21:22 )             40.4     06-12    141  |  107  |  10  ----------------------------<  145<H>  4.3   |  29  |  1.00    Ca    9.5      12 Jun 2019 21:22            CAPILLARY BLOOD GLUCOSE        PT/INR - ( 12 Jun 2019 21:22 )   PT: 10.8 sec;   INR: 0.95 ratio         PTT - ( 12 Jun 2019 21:22 )  PTT:37.9 sec    CULTURES:        Physical Examination:  General: No acute distress.  Alert, oriented, interactive, nonfocal  NEURO: A&O X3, motor function 5/5 BL UE/LE  HEENT: Pupils equal, reactive to light.  Symmetric.  PULM: CTA BL, no significant sputum production, no wheezes, rales, rhonchi  CVS: Regular rate and rhythm, no murmurs, rubs, or gallops  ABD: Soft, nondistended, nontender, normoactive bowel sounds, no masses  EXT: No edema, nontender  SKIN: Warm and well perfused, no rashes noted      EKG: ***    RADIOLOGY: ***    POCUS:          LUNG:               (+/-) A-line  (+/-) B-line predominantly anteriorly              (+/-) Effusions, (+/-) infiltrate, (+/-) atelectasis, (+/-) B-lines at bases, (+/-) curtain's sign               (+/-)  Lung sliding          CARDIAC:               LV contractility/EF WNL, (+/-)LVH              (+/-) atrial enlargement               parasternal short view concentric w/o wall motion abnormality              (+/-) signs of RV strain, (+/-) septal flattening/D'ing, (+/-) Arriaga's sign               (+/-) pericardial effusion               (+/-) valvular dz or notable vegetations               5 chamber VTI ***              Subcostal view IVC ***cm             ABDOMEN:              (+/-) abdominal ascites              (+/-) hydronephrosis          DVT STUDY: (+/-) noted thrombus [X] common femoral [X] deep femoral [X] popliteal       CENTRAL LINE: N          DATE INSERTED:                  SHARMA: N                        DATE INSERTED:                  A-LINE: N                       DATE INSERTED:                  GLOBAL ISSUE/BEST PRACTICE:  Analgesia: N/A  Sedation: N/A  HOB elevation: yes  Stress ulcer prophylaxis: protonix   VTE prophylaxis: SCD/Heparin SQ/Lovenox SQ  Glycemic control: N/A  Nutrition: NPO/Diet/TF CC:  Patient is a 79y old  Male who presents with a chief complaint of COPD exacerbation (13 Jun 2019 00:20)      HPI/BRIEF HOSPITAL COURSE:   80 yo M PMH DM, HLD, CAD s/p CABG, COPD on BiPAP and night and as needed with settings 18/6 30%. p/w a few days of weakness and SOB worsening this AM. Pt states he took 2 of his albuterol treatments today for relief       PAST MEDICAL & SURGICAL HISTORY:  PVD (peripheral vascular disease)  Hypertension  COPD (chronic obstructive pulmonary disease)  Osteomyelitis  Dyslipidemia  CAD (Coronary Artery Disease)  Diabetes Mellitus, Type II  Compound fracture: left leg  CABG (Coronary Artery Bypass Graft)    Allergies    No Known Allergies    Intolerances    shellfish (Nausea)    FAMILY HISTORY:  Family history of diabetes mellitus (Sibling)      Review of Systems:  CONSTITUTIONAL: No fever, chills, or fatigue  EYES: No eye pain, visual disturbances, or discharge  ENMT:  No difficulty hearing, tinnitus, vertigo; No sinus or throat pain  NECK: No pain or stiffness  RESPIRATORY: No cough, wheezing, chills or hemoptysis; No shortness of breath  CARDIOVASCULAR: No chest pain, palpitations, dizziness, or leg swelling  GASTROINTESTINAL: No abdominal or epigastric pain. No nausea, vomiting, or hematemesis; No diarrhea or constipation. No melena or hematochezia.  GENITOURINARY: No dysuria, frequency, hematuria, or incontinence  NEUROLOGICAL: No headaches, memory loss, loss of strength, numbness, or tremors  SKIN: No itching, burning, rashes, or lesions   MUSCULOSKELETAL: No joint pain or swelling; No muscle, back, or extremity pain  PSYCHIATRIC: No depression, anxiety, mood swings, or difficulty sleeping      Medications:    metoprolol tartrate 25 milliGRAM(s) Oral two times a day  tamsulosin 0.4 milliGRAM(s) Oral at bedtime  valsartan 80 milliGRAM(s) Oral daily  ALBUTerol/ipratropium for Nebulization 3 milliLiter(s) Nebulizer every 6 hours  aspirin enteric coated 81 milliGRAM(s) Oral daily  clopidogrel Tablet 75 milliGRAM(s) Oral daily  enoxaparin Injectable 40 milliGRAM(s) SubCutaneous daily  atorvastatin 40 milliGRAM(s) Oral at bedtime  dextrose 40% Gel 15 Gram(s) Oral once PRN  dextrose 50% Injectable 12.5 Gram(s) IV Push once  dextrose 50% Injectable 25 Gram(s) IV Push once  dextrose 50% Injectable 25 Gram(s) IV Push once  glucagon  Injectable 1 milliGRAM(s) IntraMuscular once PRN  insulin lispro (HumaLOG) corrective regimen sliding scale   SubCutaneous three times a day before meals  methylPREDNISolone sodium succinate Injectable 40 milliGRAM(s) IV Push every 8 hours  dextrose 5%. 1000 milliLiter(s) IV Continuous <Continuous>      ICU Vital Signs Last 24 Hrs  T(C): 37.1 (12 Jun 2019 20:43), Max: 37.1 (12 Jun 2019 20:43)  T(F): 98.8 (12 Jun 2019 20:43), Max: 98.8 (12 Jun 2019 20:43)  HR: 104 (13 Jun 2019 02:23) (101 - 115)  BP: 135/60 (12 Jun 2019 23:39) (128/58 - 168/92)  BP(mean): --  ABP: --  ABP(mean): --  RR: 18 (12 Jun 2019 23:39) (18 - 22)  SpO2: 98% (13 Jun 2019 02:23) (94% - 100%)    Vital Signs Last 24 Hrs  T(C): 37.1 (12 Jun 2019 20:43), Max: 37.1 (12 Jun 2019 20:43)  T(F): 98.8 (12 Jun 2019 20:43), Max: 98.8 (12 Jun 2019 20:43)  HR: 104 (13 Jun 2019 02:23) (101 - 115)  BP: 135/60 (12 Jun 2019 23:39) (128/58 - 168/92)  BP(mean): --  RR: 18 (12 Jun 2019 23:39) (18 - 22)  SpO2: 98% (13 Jun 2019 02:23) (94% - 100%)    ABG - ( 13 Jun 2019 03:40 )  pH, Arterial: 7.31  pH, Blood: x     /  pCO2: 57    /  pO2: 132   / HCO3: 26    / Base Excess: 2.3   /  SaO2: 99                  I&O's Detail        LABS:                        12.4   9.78  )-----------( 276      ( 12 Jun 2019 21:22 )             40.4     06-12    141  |  107  |  10  ----------------------------<  145<H>  4.3   |  29  |  1.00    Ca    9.5      12 Jun 2019 21:22            CAPILLARY BLOOD GLUCOSE        PT/INR - ( 12 Jun 2019 21:22 )   PT: 10.8 sec;   INR: 0.95 ratio         PTT - ( 12 Jun 2019 21:22 )  PTT:37.9 sec    CULTURES:        Physical Examination:  General: No acute distress.  Alert, oriented, interactive, nonfocal  NEURO: A&O X3, motor function 5/5 BL UE/LE  HEENT: Pupils equal, reactive to light.  Symmetric.  PULM: CTA BL, no significant sputum production, no wheezes, rales, rhonchi  CVS: Regular rate and rhythm, no murmurs, rubs, or gallops  ABD: Soft, nondistended, nontender, normoactive bowel sounds, no masses  EXT: No edema, nontender  SKIN: Warm and well perfused, no rashes noted      EKG: ***    RADIOLOGY: ***    POCUS:          LUNG:               (+/-) A-line  (+/-) B-line predominantly anteriorly              (+/-) Effusions, (+/-) infiltrate, (+/-) atelectasis, (+/-) B-lines at bases, (+/-) curtain's sign               (+/-)  Lung sliding          CARDIAC:               LV contractility/EF WNL, (+/-)LVH              (+/-) atrial enlargement               parasternal short view concentric w/o wall motion abnormality              (+/-) signs of RV strain, (+/-) septal flattening/D'ing, (+/-) Arriaga's sign               (+/-) pericardial effusion               (+/-) valvular dz or notable vegetations               5 chamber VTI ***              Subcostal view IVC ***cm             ABDOMEN:              (+/-) abdominal ascites              (+/-) hydronephrosis          DVT STUDY: (+/-) noted thrombus [X] common femoral [X] deep femoral [X] popliteal       CENTRAL LINE: N          DATE INSERTED:                  SHARMA: N                        DATE INSERTED:                  A-LINE: N                       DATE INSERTED:                  GLOBAL ISSUE/BEST PRACTICE:  Analgesia: N/A  Sedation: N/A  HOB elevation: yes  Stress ulcer prophylaxis: protonix   VTE prophylaxis: SCD/Heparin SQ/Lovenox SQ  Glycemic control: N/A  Nutrition: NPO/Diet/TF CC:  Patient is a 79y old  Male who presents with a chief complaint of COPD exacerbation (13 Jun 2019 00:20)      HPI/BRIEF HOSPITAL COURSE:   78 yo M PMH DM, HLD, CAD s/p CABG, COPD on BiPAP and night and as needed with settings 18/6 30%. p/w a few days of weakness and SOB worsening this AM. Pt states he took 2 of his albuterol treatments today for relief, and put BIPAP on, but SOB persisted. Pt's wife states she noticed  was more lethargic as well. Pt came to ED and was found to have a mild tachypnea and respiratory acidosis with pCO2 70's and pH 7.29.      ICU c/s called for COPD exacerbation. Pt was examined and assessed at the bedside and was found to be awake alert in NAD, mild increased WOB, on NC 2L. At that time I transitioned pt to BiPAP 20/6 40% for more adequate ventilation and given duonebs, repeat gas improved and overall condition improved.  Pt does not require ICU level care at this time. Will periodically re-assess patient overnight.  Would consider adjusting IPAP as needed for work of breathing and hypercarbia, keeping SpO2 88-92%, continue Duonebs, and repeat ABG in AM       PAST MEDICAL & SURGICAL HISTORY:  PVD (peripheral vascular disease)  Hypertension  COPD (chronic obstructive pulmonary disease)  Osteomyelitis  Dyslipidemia  CAD (Coronary Artery Disease)  Diabetes Mellitus, Type II  Compound fracture: left leg  CABG (Coronary Artery Bypass Graft)    Allergies    No Known Allergies    Intolerances    shellfish (Nausea)    FAMILY HISTORY:  Family history of diabetes mellitus (Sibling)      Review of Systems:  CONSTITUTIONAL: + fatigue  EYES: No eye pain, visual disturbances, or discharge  ENMT:  No difficulty hearing, tinnitus, vertigo; No sinus or throat pain  NECK: No pain or stiffness  RESPIRATORY: + dry cough, + shortness of breath  CARDIOVASCULAR: No chest pain, palpitations, dizziness, or leg swelling  GASTROINTESTINAL: No abdominal or epigastric pain. No nausea, vomiting, or hematemesis; No diarrhea or constipation. No melena or hematochezia.  GENITOURINARY: No dysuria, frequency, hematuria, or incontinence  NEUROLOGICAL: No headaches, memory loss, loss of strength, numbness, or tremors  SKIN: No itching, burning, rashes, or lesions   MUSCULOSKELETAL: No joint pain or swelling; No muscle, back, or extremity pain  PSYCHIATRIC: No depression, anxiety, mood swings, or difficulty sleeping      Medications:    metoprolol tartrate 25 milliGRAM(s) Oral two times a day  tamsulosin 0.4 milliGRAM(s) Oral at bedtime  valsartan 80 milliGRAM(s) Oral daily  ALBUTerol/ipratropium for Nebulization 3 milliLiter(s) Nebulizer every 6 hours  aspirin enteric coated 81 milliGRAM(s) Oral daily  clopidogrel Tablet 75 milliGRAM(s) Oral daily  enoxaparin Injectable 40 milliGRAM(s) SubCutaneous daily  atorvastatin 40 milliGRAM(s) Oral at bedtime  dextrose 40% Gel 15 Gram(s) Oral once PRN  dextrose 50% Injectable 12.5 Gram(s) IV Push once  dextrose 50% Injectable 25 Gram(s) IV Push once  dextrose 50% Injectable 25 Gram(s) IV Push once  glucagon  Injectable 1 milliGRAM(s) IntraMuscular once PRN  insulin lispro (HumaLOG) corrective regimen sliding scale   SubCutaneous three times a day before meals  methylPREDNISolone sodium succinate Injectable 40 milliGRAM(s) IV Push every 8 hours  dextrose 5%. 1000 milliLiter(s) IV Continuous <Continuous>      ICU Vital Signs Last 24 Hrs  T(C): 37.1 (12 Jun 2019 20:43), Max: 37.1 (12 Jun 2019 20:43)  T(F): 98.8 (12 Jun 2019 20:43), Max: 98.8 (12 Jun 2019 20:43)  HR: 104 (13 Jun 2019 02:23) (101 - 115)  BP: 135/60 (12 Jun 2019 23:39) (128/58 - 168/92)  BP(mean): --  ABP: --  ABP(mean): --  RR: 18 (12 Jun 2019 23:39) (18 - 22)  SpO2: 98% (13 Jun 2019 02:23) (94% - 100%)    Vital Signs Last 24 Hrs  T(C): 37.1 (12 Jun 2019 20:43), Max: 37.1 (12 Jun 2019 20:43)  T(F): 98.8 (12 Jun 2019 20:43), Max: 98.8 (12 Jun 2019 20:43)  HR: 104 (13 Jun 2019 02:23) (101 - 115)  BP: 135/60 (12 Jun 2019 23:39) (128/58 - 168/92)  BP(mean): --  RR: 18 (12 Jun 2019 23:39) (18 - 22)  SpO2: 98% (13 Jun 2019 02:23) (94% - 100%)    ABG - ( 13 Jun 2019 03:40 )  pH, Arterial: 7.31  pH, Blood: x     /  pCO2: 57    /  pO2: 132   / HCO3: 26    / Base Excess: 2.3   /  SaO2: 99                  I&O's Detail        LABS:                        12.4   9.78  )-----------( 276      ( 12 Jun 2019 21:22 )             40.4     06-12    141  |  107  |  10  ----------------------------<  145<H>  4.3   |  29  |  1.00    Ca    9.5      12 Jun 2019 21:22            CAPILLARY BLOOD GLUCOSE        PT/INR - ( 12 Jun 2019 21:22 )   PT: 10.8 sec;   INR: 0.95 ratio         PTT - ( 12 Jun 2019 21:22 )  PTT:37.9 sec    CULTURES:        Physical Examination:  General: No acute distress.  Alert, oriented, interactive, nonfocal  NEURO: A&O X3, motor function 5/5 BL UE/LE  HEENT: Pupils equal, reactive to light.  Symmetric.  PULM: Mildly tachypneic on 2L NC BS distant at bases, otherwise CTA, no significant sputum production, no wheezes, rales, rhonchi  CVS: Regular rate and rhythm, no murmurs, rubs, or gallops  ABD: Soft, nondistended, nontender, normoactive bowel sounds, no masses  EXT: No edema, nontender  SKIN: Warm and well perfused, no rashes noted      EKG: NSR    RADIOLOGY: < from: Xray Chest 1 View-PORTABLE IMMEDIATE (06.12.19 @ 21:30) >  FINDINGS/  IMPRESSION:  Sternotomy wires again noted. Hyper aerated lungs without consolidation   or pleural effusion.    Heart size within normal limits.    < end of copied text >

## 2019-06-13 DIAGNOSIS — N40.0 BENIGN PROSTATIC HYPERPLASIA WITHOUT LOWER URINARY TRACT SYMPTOMS: ICD-10-CM

## 2019-06-13 DIAGNOSIS — J44.1 CHRONIC OBSTRUCTIVE PULMONARY DISEASE WITH (ACUTE) EXACERBATION: ICD-10-CM

## 2019-06-13 DIAGNOSIS — E11.9 TYPE 2 DIABETES MELLITUS WITHOUT COMPLICATIONS: ICD-10-CM

## 2019-06-13 DIAGNOSIS — I10 ESSENTIAL (PRIMARY) HYPERTENSION: ICD-10-CM

## 2019-06-13 DIAGNOSIS — I25.10 ATHEROSCLEROTIC HEART DISEASE OF NATIVE CORONARY ARTERY WITHOUT ANGINA PECTORIS: ICD-10-CM

## 2019-06-13 DIAGNOSIS — Z29.9 ENCOUNTER FOR PROPHYLACTIC MEASURES, UNSPECIFIED: ICD-10-CM

## 2019-06-13 DIAGNOSIS — E78.5 HYPERLIPIDEMIA, UNSPECIFIED: ICD-10-CM

## 2019-06-13 LAB
BASE EXCESS BLDA CALC-SCNC: 1.4 MMOL/L — SIGNIFICANT CHANGE UP (ref -2–2)
BASE EXCESS BLDA CALC-SCNC: 2.3 MMOL/L — HIGH (ref -2–2)
BLOOD GAS COMMENTS ARTERIAL: SIGNIFICANT CHANGE UP
HCO3 BLDA-SCNC: 25 MMOL/L — SIGNIFICANT CHANGE UP (ref 23–27)
HCO3 BLDA-SCNC: 26 MMOL/L — SIGNIFICANT CHANGE UP (ref 23–27)
HOROWITZ INDEX BLDA+IHG-RTO: 30 — SIGNIFICANT CHANGE UP
HOROWITZ INDEX BLDA+IHG-RTO: 30 — SIGNIFICANT CHANGE UP
PCO2 BLDA: 57 MMHG — HIGH (ref 32–46)
PCO2 BLDA: 57 MMHG — HIGH (ref 32–46)
PH BLDA: 7.3 — LOW (ref 7.35–7.45)
PH BLDA: 7.31 — LOW (ref 7.35–7.45)
PO2 BLDA: 101 MMHG — SIGNIFICANT CHANGE UP (ref 74–108)
PO2 BLDA: 132 MMHG — HIGH (ref 74–108)
PROCALCITONIN SERPL-MCNC: <0.05 — SIGNIFICANT CHANGE UP (ref 0–0.04)
SAO2 % BLDA: 97 % — HIGH (ref 92–96)
SAO2 % BLDA: 99 % — HIGH (ref 92–96)

## 2019-06-13 PROCEDURE — 99233 SBSQ HOSP IP/OBS HIGH 50: CPT | Mod: GC

## 2019-06-13 RX ORDER — SODIUM CHLORIDE 9 MG/ML
1000 INJECTION, SOLUTION INTRAVENOUS
Refills: 0 | Status: DISCONTINUED | OUTPATIENT
Start: 2019-06-13 | End: 2019-06-14

## 2019-06-13 RX ORDER — DEXTROSE 50 % IN WATER 50 %
25 SYRINGE (ML) INTRAVENOUS ONCE
Refills: 0 | Status: DISCONTINUED | OUTPATIENT
Start: 2019-06-13 | End: 2019-06-14

## 2019-06-13 RX ORDER — DEXTROSE 50 % IN WATER 50 %
12.5 SYRINGE (ML) INTRAVENOUS ONCE
Refills: 0 | Status: DISCONTINUED | OUTPATIENT
Start: 2019-06-13 | End: 2019-06-14

## 2019-06-13 RX ORDER — VALSARTAN 80 MG/1
80 TABLET ORAL DAILY
Refills: 0 | Status: DISCONTINUED | OUTPATIENT
Start: 2019-06-13 | End: 2019-06-14

## 2019-06-13 RX ORDER — INSULIN LISPRO 100/ML
VIAL (ML) SUBCUTANEOUS
Refills: 0 | Status: DISCONTINUED | OUTPATIENT
Start: 2019-06-13 | End: 2019-06-14

## 2019-06-13 RX ORDER — ATORVASTATIN CALCIUM 80 MG/1
40 TABLET, FILM COATED ORAL AT BEDTIME
Refills: 0 | Status: DISCONTINUED | OUTPATIENT
Start: 2019-06-13 | End: 2019-06-14

## 2019-06-13 RX ORDER — AZITHROMYCIN 500 MG/1
500 TABLET, FILM COATED ORAL EVERY 24 HOURS
Refills: 0 | Status: DISCONTINUED | OUTPATIENT
Start: 2019-06-14 | End: 2019-06-14

## 2019-06-13 RX ORDER — GLUCAGON INJECTION, SOLUTION 0.5 MG/.1ML
1 INJECTION, SOLUTION SUBCUTANEOUS ONCE
Refills: 0 | Status: DISCONTINUED | OUTPATIENT
Start: 2019-06-13 | End: 2019-06-14

## 2019-06-13 RX ORDER — TIOTROPIUM BROMIDE 18 UG/1
1 CAPSULE ORAL; RESPIRATORY (INHALATION) DAILY
Refills: 0 | Status: DISCONTINUED | OUTPATIENT
Start: 2019-06-13 | End: 2019-06-14

## 2019-06-13 RX ORDER — ENOXAPARIN SODIUM 100 MG/ML
40 INJECTION SUBCUTANEOUS DAILY
Refills: 0 | Status: DISCONTINUED | OUTPATIENT
Start: 2019-06-13 | End: 2019-06-14

## 2019-06-13 RX ORDER — IPRATROPIUM/ALBUTEROL SULFATE 18-103MCG
3 AEROSOL WITH ADAPTER (GRAM) INHALATION EVERY 6 HOURS
Refills: 0 | Status: DISCONTINUED | OUTPATIENT
Start: 2019-06-13 | End: 2019-06-14

## 2019-06-13 RX ORDER — BUDESONIDE AND FORMOTEROL FUMARATE DIHYDRATE 160; 4.5 UG/1; UG/1
2 AEROSOL RESPIRATORY (INHALATION)
Refills: 0 | Status: DISCONTINUED | OUTPATIENT
Start: 2019-06-13 | End: 2019-06-14

## 2019-06-13 RX ORDER — CLOPIDOGREL BISULFATE 75 MG/1
75 TABLET, FILM COATED ORAL DAILY
Refills: 0 | Status: DISCONTINUED | OUTPATIENT
Start: 2019-06-13 | End: 2019-06-14

## 2019-06-13 RX ORDER — AZITHROMYCIN 500 MG/1
TABLET, FILM COATED ORAL
Refills: 0 | Status: DISCONTINUED | OUTPATIENT
Start: 2019-06-13 | End: 2019-06-14

## 2019-06-13 RX ORDER — METOPROLOL TARTRATE 50 MG
25 TABLET ORAL
Refills: 0 | Status: DISCONTINUED | OUTPATIENT
Start: 2019-06-13 | End: 2019-06-14

## 2019-06-13 RX ORDER — TAMSULOSIN HYDROCHLORIDE 0.4 MG/1
0.4 CAPSULE ORAL AT BEDTIME
Refills: 0 | Status: DISCONTINUED | OUTPATIENT
Start: 2019-06-13 | End: 2019-06-14

## 2019-06-13 RX ORDER — AZITHROMYCIN 500 MG/1
500 TABLET, FILM COATED ORAL ONCE
Refills: 0 | Status: COMPLETED | OUTPATIENT
Start: 2019-06-13 | End: 2019-06-13

## 2019-06-13 RX ORDER — DEXTROSE 50 % IN WATER 50 %
15 SYRINGE (ML) INTRAVENOUS ONCE
Refills: 0 | Status: DISCONTINUED | OUTPATIENT
Start: 2019-06-13 | End: 2019-06-14

## 2019-06-13 RX ORDER — ASPIRIN/CALCIUM CARB/MAGNESIUM 324 MG
81 TABLET ORAL DAILY
Refills: 0 | Status: DISCONTINUED | OUTPATIENT
Start: 2019-06-13 | End: 2019-06-14

## 2019-06-13 RX ADMIN — CLOPIDOGREL BISULFATE 75 MILLIGRAM(S): 75 TABLET, FILM COATED ORAL at 11:27

## 2019-06-13 RX ADMIN — Medication 25 MILLIGRAM(S): at 06:21

## 2019-06-13 RX ADMIN — Medication 3 MILLILITER(S): at 20:02

## 2019-06-13 RX ADMIN — Medication 81 MILLIGRAM(S): at 11:27

## 2019-06-13 RX ADMIN — Medication 25 MILLIGRAM(S): at 17:39

## 2019-06-13 RX ADMIN — Medication 40 MILLIGRAM(S): at 06:20

## 2019-06-13 RX ADMIN — Medication 2: at 11:57

## 2019-06-13 RX ADMIN — TAMSULOSIN HYDROCHLORIDE 0.4 MILLIGRAM(S): 0.4 CAPSULE ORAL at 21:31

## 2019-06-13 RX ADMIN — ENOXAPARIN SODIUM 40 MILLIGRAM(S): 100 INJECTION SUBCUTANEOUS at 11:27

## 2019-06-13 RX ADMIN — Medication 40 MILLIGRAM(S): at 14:19

## 2019-06-13 RX ADMIN — AZITHROMYCIN 255 MILLIGRAM(S): 500 TABLET, FILM COATED ORAL at 18:53

## 2019-06-13 RX ADMIN — BUDESONIDE AND FORMOTEROL FUMARATE DIHYDRATE 2 PUFF(S): 160; 4.5 AEROSOL RESPIRATORY (INHALATION) at 18:53

## 2019-06-13 RX ADMIN — ATORVASTATIN CALCIUM 40 MILLIGRAM(S): 80 TABLET, FILM COATED ORAL at 21:31

## 2019-06-13 RX ADMIN — Medication 2: at 06:34

## 2019-06-13 RX ADMIN — Medication 2: at 17:39

## 2019-06-13 RX ADMIN — Medication 3 MILLILITER(S): at 07:53

## 2019-06-13 RX ADMIN — Medication 3 MILLILITER(S): at 13:54

## 2019-06-13 RX ADMIN — Medication 3 MILLILITER(S): at 02:20

## 2019-06-13 RX ADMIN — VALSARTAN 80 MILLIGRAM(S): 80 TABLET ORAL at 06:21

## 2019-06-13 RX ADMIN — TIOTROPIUM BROMIDE 1 CAPSULE(S): 18 CAPSULE ORAL; RESPIRATORY (INHALATION) at 18:53

## 2019-06-13 RX ADMIN — Medication 40 MILLIGRAM(S): at 21:31

## 2019-06-13 NOTE — H&P ADULT - HISTORY OF PRESENT ILLNESS
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 Per wife, patient has been doing fine since his discharge 3 weeks ago.  He was on prednisone and feeling well.  He recently was tapered off of the doesn't exactly remember when).    In the ED, pt's abg showed pH 7.29, pCO2 62.  He received 125 mg of solumderol.  Duonebs was given.  He is currently on BiPAP.   Of note, he is a chronic Co2 retainer 2/2 COPD and uses BiPAP intermittently throughout the day.    Pt was speaking in full sentences to me while on BiPAP.      ICU was consulted to evaluate pt.

## 2019-06-13 NOTE — CONSULT NOTE ADULT - SUBJECTIVE AND OBJECTIVE BOX
Patient chart was reviewed Patient has not been examined yet but will be done so later today and following that  the final note will be entered in the space below Workup and management orders based on current assessment have been entered to expedite patient care  Nursing notified for stat orders as applicable Meanwhile please refer to my previous Pulmonary Critical Care notes on this patient or call me directly COMMODORE TURNER [facility] P [phone]  1940 DOA 2019 DR VESTA MALDONADO   ALLERGY Shellfish  CONTACT Sp Amira     Initial evaluation/Pulmonary Critical Care consultation requested on  2019  by Dr Crockett   from Dr Dejesus     Patient examined chart reviewed    HOSPITAL ADMISSION   PATIENT CAME  FROM (if information available)        TYPE OF VISIT      Initial evaluation/Pulmonary Critical Care consultation requested on  2019  by Dr Crockett   from Dr Dejesus     REASON FOR VISIT  For list of problems evaluated/addressed, please see problem list in the note below     PATIENT COMMODORE TURNER [facility] P [phone]  1940 DOA 2019 DR VESTA MALDONADO     PT DESCRIPTION     Information in this section was taken from ER provider note  · Chief Complaint: The patient is a 79y Male complaining of difficulty breathing.  · HPI Objective Statement: 78 yo black male with H/O CAD (Coronary Artery Disease)    COPD (chronic obstructive pulmonary disease)    Diabetes Mellitus, Type II    Dyslipidemia    Hypertension    Osteomyelitis and   PVD (peripheral vascular disease) who was at his baseline state of health until this morning when he began to experience SOB. Over the course of the day, dyspnea has worsened and feels similar to his previous COPD exacerbations. Patient denies any fever, chills, chest pain, cough, palpitations, nausea, vomiting or diarrhea. Not on steroids at present time. Pulmonary=Oleg    PCP=[facility]  · Presenting Symptoms: DIFFICULTY BREATHING, DYSPNEA ON EXERTION, SHORTNESS OF BREATH, WHEEZING  · Negative Findings: no chest pain, no chills, no cough, no diaphoresis, no edema, no fever, no hemoptysis  · Timing: gradual onset, worsening  · Duration: today  · Aggravated Factors: walking  · Relieving Factors: Rest    PAST MEDICAL/SURGICAL/FAMILY/SOCIAL HISTORY:    Past Medical History:  CAD (Coronary Artery Disease)    COPD (chronic obstructive pulmonary disease)    Diabetes Mellitus, Type II    Dyslipidemia    Hypertension    Osteomyelitis    PVD (peripheral vascular disease).     Past Surgical History:  CABG (Coronary Artery Bypass Graft)    Compound fracture  left leg.    · Marital Status:   · Lives With: alone; spouse     Alcohol Use History:  · Have you ever consumed alcohol yes...     Tobacco Usage:  · Tobacco Usage Former smoker    · Attestation Comment: I have reviewed and confirmed nurses' notes for patient's medications, allergies, medical history, and surgical history.    ALLERGIES AND HOME MEDICATIONS:   Allergies:        Allergies:  No Known Allergies:        Intolerances:  shellfish: Food, Nausea, feels nauseous when eating crabs or shrimp but no true allergic reaction    Home Medications:   * Incomplete Medication History as of 2019 20:45 documented in Structured Notes  · metoprolol tartrate 25 mg oral tablet: Last Dose Taken:  , 1 tab(s) orally 2 times a day  · valsartan 80 mg oral tablet: Last Dose Taken:  , 1 tab(s) orally once a day  · Multiple Vitamins oral tablet: Last Dose Taken:  , 1 tab(s) orally once a day  · clopidogrel 75 mg oral tablet: Last Dose Taken:  , 1 tab(s) orally once a day  · aspirin 81 mg oral delayed release tablet: Last Dose Taken:  , 1 tab(s) orally once a day  · atorvastatin 40 mg oral tablet: Last Dose Taken:  , 1 tab(s) orally once a day  · glipiZIDE 5 mg oral tablet: Last Dose Taken:  , 0.5 tab(s) orally 2 times a day  · tamsulosin 0.4 mg oral capsule: Last Dose Taken:  , 1 cap(s) orally once a day (at bedtime)  · Breo Ellipta 100 mcg-25 mcg/inh inhalation powder: Last Dose Taken:  , 1 puff(s) inhaled once a day  · Ventolin HFA 90 mcg/inh inhalation aerosol: Last Dose Taken:  , 2 puff(s) inhaled 4 times a day  · Incruse Ellipta 62.5 mcg/inh inhalation powder: Last Dose Taken:  , 1 puff(s) inhaled once a day  · metFORMIN 1000 mg oral tablet: Last Dose Taken:  , 1 tab(s) orally 2 times a day    REVIEW OF SYSTEMS:    Review of Systems:  · RESPIRATORY: - - -  · Respiratory [+]: SHORTNESS OF BREATH  · ROS STATEMENT: all other ROS negative except as per HPI    PHYSICAL EXAM:   · CONSTITUTIONAL: Weak appearing tall black male, well nourished, awake, alert, oriented to person, place, time/situation and in mild to respiratory apparent distress.  · ENMT: Airway patent  · EYES: Clear bilaterally, pupils equal, round and reactive to light.  · CARDIAC: Normal rate, regular rhythm.  Heart sounds S1, S2.  No murmurs, rubs or gallops.  · RESPIRATORY: - - -  · Breath Sounds: DIMINISHED, WHEEZES  · Diminished Breath Sounds: DIFFUSE  · Wheezes: DIFFUSE  · GASTROINTESTINAL: Abdomen soft, non-tender, no guarding.  · MUSCULOSKELETAL: No muscle or joint tenderness  · NEUROLOGICAL: No motor or sensory deficits.  · SKIN: Trace pitting pedal edema    PATIENT COMMODORE YUE [facility] P [phone]  1940 DOA 2019 DR VESTA MALDONADO     VITALS/LABS       2019 afeb 95 140/80 19 98%   2019 W 9.7 Hb 12.4 Plt 276 INR .9 Na 141 K 4.3 CO2 29 Cr 1   2019 CXR no consolidation   2019 ekg s tachy      PATIENT COMMODORE YUE [facility] P [phone]  1940 DOA 2019 DR VESTA MALDONADO     PROBLEMS     HYPOXEMIC HYPERCAPNIC RESP FAILURE POA   COPD ex     PATIENT  COMMODORE YUE [facility] P [phone]  1940 DOA 2019 DR VESTA MALDONADO     MEDICATIONS     CAD   ASA 81 ()   plavix 75 ()   metoprolol 25.2 ()   valsartan 80 ()   atyorvastat 40 ()   DVT P   Lvnx 40 ()   DM   iss ()   COPD   duoneb.4 ()  Solumed 40.3 ()    BPH  Tamsulosin .4 ()    GLOBAL ISSUE/BEST PRACTICE:      PROBLEM: HOB elevation:   y            PROBLEM: Stress ulcer proph:    na                      PROBLEM: VTE prophylaxis: lvnx 40 ()        PROBLEM: Glycemic control:    na  PROBLEM: Nutrition:   cons carb ()   PROBLEM: Advanced directive: na     PROBLEM: Allergies:  na    REVIEW OF SYMPTOMS     NOTE Changess if any  in ROS and PE are updated in daily HOSPITAL COURSE below      Able to give ROS  Yes     RELIABLE No   CONSTITUTIONAL Weakness Yes  Chills No Vision changes No  ENDOCRINE No unexplained hair loss No heat or cold intolerance    ALLERGY No hives  Sore throat No   RESP Coughing blood no  Shortness of breath YES   NEURO No Headache  Confusion Pain neck No   CARDIAC No Chest pain No Palpitations   GI No Pain abdomen NO   Vomiting NO     PHYSICAL EXAM    HEENT Unremarkable PERRLA atraumatic   RESP Fair air entry EXP prolonged    Harsh breath sound Resp distres mild   CARDIAC S1 S2 No S3     NO JVD    ABDOMEN SOFT BS PRESENT NOT DISTENDED No hepatosplenomegaly PEDAL EDEMA present No calf tenderness  NO rash   GENERAL Not TOXIC

## 2019-06-13 NOTE — CONSULT NOTE ADULT - ASSESSMENT
PATIENT COMMODORE YUE [facility] P [phone]  1940 DOA 2019 DR VESTA MALDONADO     PATIENT SUMMARY    79 m PMH HTN, DM2 (on home Metformin and glipizide), COPD (2L home/ BIPAP at night), CAD with 3v CABG 2004, HLD, 10/26/2018 ECHO ef 55% hx hypercapnic resp failure with ho intubation c/b with cardiac arrest (2018) with ho recurrent admissions GOLD D admitted [facility] P 2019 p with progressive resp distress Improved with BPAP     Home meds included  metorpolol 25.2 valsartan 80 plavx 75 asa breo 100 ventiolin incruse metformin 1000.2               PATIENT COMMODORE YUE [facility] P [phone]  1940 DOA 2019 DR VESTA MALDONADO     PROBLEM/ASSESSMENT/RECOMMENDATIONS (A/R)       HYPOXEMIC HYPERCAPNIC RESP FAILURE POA   2019 3a /6 731/57/132   2019 1a 730/57/101 18/6   2019 10p 2l729/62/86   A/R Gas excange suboptimal but acceptable Continue bpap noct and periodically daytime as needed  COPD ex   On BD steroids   2019 Added azithro and symbicort       TIME SPENT Over 55 minutes aggregate care time spent on encounter; activities included   direct pt care, counseling and/or coordinating care reviewing notes, lab data/ imaging , discussion with multidisciplinary team/ pt /family. Risks, benefits, alternatives  discussed in detail
ASSESSMENT   78 yo M PMH DM, HLD, CAD s/p CABG, COPD on BiPAP and night and as needed with settings 18/6 30%. p/w a few days of weakness and SOB worsening this AM. ICU c/s called for COPD exacerbation. Pt was examined and assessed at the bedside and was found to be awake alert in NAD, mild increased WOB, on NC 2L. At that time I transitioned pt to BiPAP 20/6 40% for more adequate ventilation and given duonebs, repeat gas improved and overall condition improved.  Pt does not require ICU level care at this time.     1.  COPD exacerbation   2.  SOB   3. Acute hypercarbic RF     PLAN   -Will periodically re-assess patient overnight.    -Would consider adjusting IPAP as needed for work of breathing and hypercarbia, keeping SpO2 88-92%  -continue Duonebs,   -repeat ABG in AM   -steroids as needed   -azithro for antiinflammatory affect in renata setting of COPD

## 2019-06-13 NOTE — H&P ADULT - ASSESSMENT
Pt 80 yo m pmh with PMHx significant for HTN, COPD (On home O2 2L and BIPAP at while sleeping regardless of time of day), CAD w/ 3v CABG, HLD, DM2 (on Metformin, glipizide), hx Hypercapnic Resp Failure/Cardiac Arrest requiring intubation (6/'18) being admitted for COPD exacerbation  1. COPD exacerbation - Continue Solumderol. Consulted Pulmonary.  Duonebs .  BiPAP.  F/U repeat ABG  2. HTN - continue BP meds with hold parameters  3. CAD w 3v CABG - continue asa and plavix  4. HLD - continue statin  5. DM 2- ISS AC HS  6. BPH - continue Tamsulosin  7. DVT proph - Heparin BID

## 2019-06-13 NOTE — H&P ADULT - NSHPREVIEWOFSYSTEMS_GEN_ALL_CORE
REVIEW OF SYSTEMS  Constitutional: No fever, chills   Neuro: No headache, numbness, weakness  Resp: wheezing and dyspnea on exertion   CVS: No chest pain, palpitations, leg swelling   GI: No abdominal pain, nausea, vomiting, diarrhea   : No dysuria, frequency  Skin: No itching, burning, rashes  Msk: No weakness, joint pain

## 2019-06-13 NOTE — H&P ADULT - NSHPPHYSICALEXAM_GEN_ALL_CORE
Physical Exam:  General: Well developed, frail, elderly black male, increased work of breathing  HEENT: NCAT, PERRLA, EOMI bl . BiPAP in place  Neck: Supple, nontender  Neurology: A&Ox3, nonfocal, CN II-XII grossly intact, sensation intact  Respiratory: diffuse expiratory wheezing in all lung fields, no accessory muscle use  CV: RRR, S1/S2, no murmurs, rubs or gallops  Abdominal: Soft, obese, NT, ND BSx4  Extremities: No clubbing, edema, 2+b/l radial, PT, and DP peripheral pulses  MSK: Normal ROM, no joint erythema or warmth, no joint swelling   Skin: warm, dry, normal color

## 2019-06-13 NOTE — PROGRESS NOTE ADULT - SUBJECTIVE AND OBJECTIVE BOX
Patient is a 79y old  Male who presents with a chief complaint of COPD exacerbation (13 Jun 2019 10:38)      INTERVAL HPI/OVERNIGHT EVENTS: Patient seen and examined at bedside. Report shortness of breath. Denies fever, cough, sputum production, chest pain or palpitations.     MEDICATIONS  (STANDING):  ALBUTerol/ipratropium for Nebulization 3 milliLiter(s) Nebulizer every 6 hours  aspirin enteric coated 81 milliGRAM(s) Oral daily  atorvastatin 40 milliGRAM(s) Oral at bedtime  clopidogrel Tablet 75 milliGRAM(s) Oral daily  dextrose 5%. 1000 milliLiter(s) (50 mL/Hr) IV Continuous <Continuous>  dextrose 50% Injectable 12.5 Gram(s) IV Push once  dextrose 50% Injectable 25 Gram(s) IV Push once  dextrose 50% Injectable 25 Gram(s) IV Push once  enoxaparin Injectable 40 milliGRAM(s) SubCutaneous daily  insulin lispro (HumaLOG) corrective regimen sliding scale   SubCutaneous three times a day before meals  methylPREDNISolone sodium succinate Injectable 40 milliGRAM(s) IV Push every 8 hours  metoprolol tartrate 25 milliGRAM(s) Oral two times a day  tamsulosin 0.4 milliGRAM(s) Oral at bedtime  valsartan 80 milliGRAM(s) Oral daily    MEDICATIONS  (PRN):  dextrose 40% Gel 15 Gram(s) Oral once PRN Blood Glucose LESS THAN 70 milliGRAM(s)/deciliter  glucagon  Injectable 1 milliGRAM(s) IntraMuscular once PRN Glucose LESS THAN 70 milligrams/deciliter      Allergies    No Known Allergies    Intolerances    shellfish (Nausea)      REVIEW OF SYSTEMS:  CONSTITUTIONAL: No fever or chills  HEENT: No headache, no sore throat  RESPIRATORY: No cough, wheezing, or shortness of breath  CARDIOVASCULAR: No chest pain, palpitations, or leg swelling  GASTROINTESTINAL: No abd pain, nausea, vomiting, or diarrhea  GENITOURINARY: No dysuria, frequency, or hematuria  NEUROLOGICAL: No focal weakness or dizziness  MUSCULOSKELETAL: No myalgias     Vital Signs Last 24 Hrs  T(C): 36.6 (13 Jun 2019 10:54), Max: 37.1 (12 Jun 2019 20:43)  T(F): 97.9 (13 Jun 2019 10:54), Max: 98.8 (12 Jun 2019 20:43)  HR: 78 (13 Jun 2019 11:00) (72 - 115)  BP: 134/81 (13 Jun 2019 10:54) (121/56 - 168/92)  BP(mean): --  RR: 16 (13 Jun 2019 10:54) (16 - 22)  SpO2: 99% (13 Jun 2019 11:00) (94% - 100%)    PHYSICAL EXAM:  General: Well developed, elderly black male, non labored breathing  HEENT: NCAT, PERRLA, EOMI bl . not on bipap   Neck: Supple, nontender  Neurology: A&Ox3, nonfocal, CN II-XII grossly intact, sensation intact  Respiratory: mild rales throughout, no accessory muscle use  CV: RRR, S1/S2, no murmurs, rubs or gallops  Abdominal: Soft, obese, NT, ND BSx4  Extremities: No clubbing, edema, 2+b/l radial, PT, and DP peripheral pulses  MSK: Normal ROM, no joint erythema or warmth, no joint swelling   Skin: warm, dry, normal color    LABS:                        12.4   9.78  )-----------( 276      ( 12 Jun 2019 21:22 )             40.4     CBC Full  -  ( 12 Jun 2019 21:22 )  WBC Count : 9.78 K/uL  Hemoglobin : 12.4 g/dL  Hematocrit : 40.4 %  Platelet Count - Automated : 276 K/uL  Mean Cell Volume : 89.8 fl  Mean Cell Hemoglobin : 27.6 pg  Mean Cell Hemoglobin Concentration : 30.7 gm/dL  Auto Neutrophil # : x  Auto Lymphocyte # : x  Auto Monocyte # : x  Auto Eosinophil # : x  Auto Basophil # : x  Auto Neutrophil % : x  Auto Lymphocyte % : x  Auto Monocyte % : x  Auto Eosinophil % : x  Auto Basophil % : x    12 Jun 2019 21:22    141    |  107    |  10     ----------------------------<  145    4.3     |  29     |  1.00     Ca    9.5        12 Jun 2019 21:22      PT/INR - ( 12 Jun 2019 21:22 )   PT: 10.8 sec;   INR: 0.95 ratio         PTT - ( 12 Jun 2019 21:22 )  PTT:37.9 sec    CAPILLARY BLOOD GLUCOSE      POCT Blood Glucose.: 244 mg/dL (13 Jun 2019 11:41)  POCT Blood Glucose.: 246 mg/dL (13 Jun 2019 06:29)          RADIOLOGY & ADDITIONAL TESTS:    Personally reviewed.     Consultant(s) Notes Reviewed:  [x] YES  [ ] NO Patient is a 79y old  Male who presents with a chief complaint of COPD exacerbation (13 Jun 2019 10:38)      INTERVAL HPI/OVERNIGHT EVENTS: Patient seen and examined at bedside in ED on BIPAP. Report shortness of breath. Denies fever, cough, sputum production, chest pain or palpitations.     MEDICATIONS  (STANDING):  ALBUTerol/ipratropium for Nebulization 3 milliLiter(s) Nebulizer every 6 hours  aspirin enteric coated 81 milliGRAM(s) Oral daily  atorvastatin 40 milliGRAM(s) Oral at bedtime  clopidogrel Tablet 75 milliGRAM(s) Oral daily  dextrose 5%. 1000 milliLiter(s) (50 mL/Hr) IV Continuous <Continuous>  dextrose 50% Injectable 12.5 Gram(s) IV Push once  dextrose 50% Injectable 25 Gram(s) IV Push once  dextrose 50% Injectable 25 Gram(s) IV Push once  enoxaparin Injectable 40 milliGRAM(s) SubCutaneous daily  insulin lispro (HumaLOG) corrective regimen sliding scale   SubCutaneous three times a day before meals  methylPREDNISolone sodium succinate Injectable 40 milliGRAM(s) IV Push every 8 hours  metoprolol tartrate 25 milliGRAM(s) Oral two times a day  tamsulosin 0.4 milliGRAM(s) Oral at bedtime  valsartan 80 milliGRAM(s) Oral daily    MEDICATIONS  (PRN):  dextrose 40% Gel 15 Gram(s) Oral once PRN Blood Glucose LESS THAN 70 milliGRAM(s)/deciliter  glucagon  Injectable 1 milliGRAM(s) IntraMuscular once PRN Glucose LESS THAN 70 milligrams/deciliter      Allergies    No Known Allergies    Intolerances    shellfish (Nausea)      REVIEW OF SYSTEMS:  CONSTITUTIONAL: No fever or chills  HEENT: No headache, no sore throat  RESPIRATORY: No cough, wheezing, or shortness of breath  CARDIOVASCULAR: No chest pain, palpitations, or leg swelling  GASTROINTESTINAL: No abd pain, nausea, vomiting, or diarrhea  GENITOURINARY: No dysuria, frequency, or hematuria  NEUROLOGICAL: No focal weakness or dizziness  MUSCULOSKELETAL: No myalgias     Vital Signs Last 24 Hrs  T(C): 36.6 (13 Jun 2019 10:54), Max: 37.1 (12 Jun 2019 20:43)  T(F): 97.9 (13 Jun 2019 10:54), Max: 98.8 (12 Jun 2019 20:43)  HR: 78 (13 Jun 2019 11:00) (72 - 115)  BP: 134/81 (13 Jun 2019 10:54) (121/56 - 168/92)  BP(mean): --  RR: 16 (13 Jun 2019 10:54) (16 - 22)  SpO2: 99% (13 Jun 2019 11:00) (94% - 100%)    PHYSICAL EXAM:  General: Well developed, elderly black male, non labored breathing  HEENT: NCAT, PERRLA, EOMI bl . not on bipap   Neck: Supple, nontender  Neurology: A&Ox3, nonfocal, CN II-XII grossly intact, sensation intact  Respiratory: mild rales throughout, no accessory muscle use  CV: RRR, S1/S2, no murmurs, rubs or gallops  Abdominal: Soft, obese, NT, ND BSx4  Extremities: No clubbing, edema, 2+b/l radial, PT, and DP peripheral pulses  MSK: Normal ROM, no joint erythema or warmth, no joint swelling   Skin: warm, dry, normal color    LABS:                        12.4   9.78  )-----------( 276      ( 12 Jun 2019 21:22 )             40.4     CBC Full  -  ( 12 Jun 2019 21:22 )  WBC Count : 9.78 K/uL  Hemoglobin : 12.4 g/dL  Hematocrit : 40.4 %  Platelet Count - Automated : 276 K/uL  Mean Cell Volume : 89.8 fl  Mean Cell Hemoglobin : 27.6 pg  Mean Cell Hemoglobin Concentration : 30.7 gm/dL  Auto Neutrophil # : x  Auto Lymphocyte # : x  Auto Monocyte # : x  Auto Eosinophil # : x  Auto Basophil # : x  Auto Neutrophil % : x  Auto Lymphocyte % : x  Auto Monocyte % : x  Auto Eosinophil % : x  Auto Basophil % : x    12 Jun 2019 21:22    141    |  107    |  10     ----------------------------<  145    4.3     |  29     |  1.00     Ca    9.5        12 Jun 2019 21:22      PT/INR - ( 12 Jun 2019 21:22 )   PT: 10.8 sec;   INR: 0.95 ratio         PTT - ( 12 Jun 2019 21:22 )  PTT:37.9 sec    CAPILLARY BLOOD GLUCOSE      POCT Blood Glucose.: 244 mg/dL (13 Jun 2019 11:41)  POCT Blood Glucose.: 246 mg/dL (13 Jun 2019 06:29)          RADIOLOGY & ADDITIONAL TESTS:    Personally reviewed.     Consultant(s) Notes Reviewed:  [x] YES  [ ] NO

## 2019-06-13 NOTE — PROGRESS NOTE ADULT - PROBLEM SELECTOR PLAN 7
IMPROVE VTE Individual Risk Assessment        RISK                                                          Points  [  ] Previous VTE                                                3  [  ] Thrombophilia                                             2  [  ] Lower limb paralysis                                   2        (unable to hold up >15 seconds)    [  ] Current Cancer                                            2         (within 6 months)  [  ] Immobilization > 24 hrs                              1  [  ] ICU/CCU stay > 24 hours                            1  [ x ] Age > 60                                                    1  IMPROVE VTE Score ____1_____  Levenox 40 DVT ppx ; Levenox 40

## 2019-06-13 NOTE — PROGRESS NOTE ADULT - PROBLEM SELECTOR PLAN 1
Acute exacerbation of COPD   - Admit to tele   - Continue dominik solumedrol   - BIPAP prn   - F/u pulm consult (Dr. Dejesus) Acute exacerbation of COPD   - Continue dunoebs, solumedrol IV  - BIPAP prn   - F/u pulm consult (Dr. Dejesus)

## 2019-06-14 ENCOUNTER — TRANSCRIPTION ENCOUNTER (OUTPATIENT)
Age: 79
End: 2019-06-14

## 2019-06-14 VITALS — OXYGEN SATURATION: 99 %

## 2019-06-14 LAB
ALBUMIN SERPL ELPH-MCNC: 3.1 G/DL — LOW (ref 3.3–5)
ALP SERPL-CCNC: 84 U/L — SIGNIFICANT CHANGE UP (ref 40–120)
ALT FLD-CCNC: 20 U/L — SIGNIFICANT CHANGE UP (ref 12–78)
ANION GAP SERPL CALC-SCNC: 5 MMOL/L — SIGNIFICANT CHANGE UP (ref 5–17)
AST SERPL-CCNC: 10 U/L — LOW (ref 15–37)
BASE EXCESS BLDA CALC-SCNC: 5.8 MMOL/L — HIGH (ref -2–2)
BASOPHILS # BLD AUTO: 0.02 K/UL — SIGNIFICANT CHANGE UP (ref 0–0.2)
BASOPHILS NFR BLD AUTO: 0.1 % — SIGNIFICANT CHANGE UP (ref 0–2)
BILIRUB SERPL-MCNC: 0.2 MG/DL — SIGNIFICANT CHANGE UP (ref 0.2–1.2)
BLOOD GAS COMMENTS ARTERIAL: SIGNIFICANT CHANGE UP
BUN SERPL-MCNC: 19 MG/DL — SIGNIFICANT CHANGE UP (ref 7–23)
CALCIUM SERPL-MCNC: 9.2 MG/DL — SIGNIFICANT CHANGE UP (ref 8.5–10.1)
CHLORIDE SERPL-SCNC: 104 MMOL/L — SIGNIFICANT CHANGE UP (ref 96–108)
CO2 SERPL-SCNC: 32 MMOL/L — HIGH (ref 22–31)
CREAT SERPL-MCNC: 1.2 MG/DL — SIGNIFICANT CHANGE UP (ref 0.5–1.3)
D DIMER BLD IA.RAPID-MCNC: <150 NG/ML DDU — SIGNIFICANT CHANGE UP
EOSINOPHIL # BLD AUTO: 0.01 K/UL — SIGNIFICANT CHANGE UP (ref 0–0.5)
EOSINOPHIL NFR BLD AUTO: 0.1 % — SIGNIFICANT CHANGE UP (ref 0–6)
GLUCOSE SERPL-MCNC: 251 MG/DL — HIGH (ref 70–99)
HCO3 BLDA-SCNC: 29 MMOL/L — HIGH (ref 23–27)
HCT VFR BLD CALC: 36.7 % — LOW (ref 39–50)
HGB BLD-MCNC: 11.3 G/DL — LOW (ref 13–17)
HOROWITZ INDEX BLDA+IHG-RTO: 21 — SIGNIFICANT CHANGE UP
IMM GRANULOCYTES NFR BLD AUTO: 1 % — SIGNIFICANT CHANGE UP (ref 0–1.5)
INR BLD: 0.98 RATIO — SIGNIFICANT CHANGE UP (ref 0.88–1.16)
LYMPHOCYTES # BLD AUTO: 0.84 K/UL — LOW (ref 1–3.3)
LYMPHOCYTES # BLD AUTO: 5.6 % — LOW (ref 13–44)
MCHC RBC-ENTMCNC: 27.4 PG — SIGNIFICANT CHANGE UP (ref 27–34)
MCHC RBC-ENTMCNC: 30.8 GM/DL — LOW (ref 32–36)
MCV RBC AUTO: 89.1 FL — SIGNIFICANT CHANGE UP (ref 80–100)
MONOCYTES # BLD AUTO: 0.98 K/UL — HIGH (ref 0–0.9)
MONOCYTES NFR BLD AUTO: 6.5 % — SIGNIFICANT CHANGE UP (ref 2–14)
NEUTROPHILS # BLD AUTO: 13.11 K/UL — HIGH (ref 1.8–7.4)
NEUTROPHILS NFR BLD AUTO: 86.7 % — HIGH (ref 43–77)
NRBC # BLD: 0 /100 WBCS — SIGNIFICANT CHANGE UP (ref 0–0)
PCO2 BLDA: 54 MMHG — HIGH (ref 32–46)
PH BLDA: 7.37 — SIGNIFICANT CHANGE UP (ref 7.35–7.45)
PHOSPHATE SERPL-MCNC: 3.6 MG/DL — SIGNIFICANT CHANGE UP (ref 2.5–4.5)
PLATELET # BLD AUTO: 258 K/UL — SIGNIFICANT CHANGE UP (ref 150–400)
PO2 BLDA: 98 MMHG — SIGNIFICANT CHANGE UP (ref 74–108)
POTASSIUM SERPL-MCNC: 5.2 MMOL/L — SIGNIFICANT CHANGE UP (ref 3.5–5.3)
POTASSIUM SERPL-SCNC: 5.2 MMOL/L — SIGNIFICANT CHANGE UP (ref 3.5–5.3)
PROT SERPL-MCNC: 6 G/DL — SIGNIFICANT CHANGE UP (ref 6–8.3)
PROTHROM AB SERPL-ACNC: 11.2 SEC — SIGNIFICANT CHANGE UP (ref 10–12.9)
RBC # BLD: 4.12 M/UL — LOW (ref 4.2–5.8)
RBC # FLD: 13.1 % — SIGNIFICANT CHANGE UP (ref 10.3–14.5)
SAO2 % BLDA: 98 % — HIGH (ref 92–96)
SODIUM SERPL-SCNC: 141 MMOL/L — SIGNIFICANT CHANGE UP (ref 135–145)
TROPONIN I SERPL-MCNC: <.015 NG/ML — SIGNIFICANT CHANGE UP (ref 0.01–0.04)
WBC # BLD: 15.11 K/UL — HIGH (ref 3.8–10.5)
WBC # FLD AUTO: 15.11 K/UL — HIGH (ref 3.8–10.5)

## 2019-06-14 PROCEDURE — 84100 ASSAY OF PHOSPHORUS: CPT

## 2019-06-14 PROCEDURE — 93005 ELECTROCARDIOGRAM TRACING: CPT

## 2019-06-14 PROCEDURE — 94760 N-INVAS EAR/PLS OXIMETRY 1: CPT

## 2019-06-14 PROCEDURE — 94640 AIRWAY INHALATION TREATMENT: CPT

## 2019-06-14 PROCEDURE — 85730 THROMBOPLASTIN TIME PARTIAL: CPT

## 2019-06-14 PROCEDURE — 80053 COMPREHEN METABOLIC PANEL: CPT

## 2019-06-14 PROCEDURE — 84484 ASSAY OF TROPONIN QUANT: CPT

## 2019-06-14 PROCEDURE — 99239 HOSP IP/OBS DSCHRG MGMT >30: CPT | Mod: GC

## 2019-06-14 PROCEDURE — 82803 BLOOD GASES ANY COMBINATION: CPT

## 2019-06-14 PROCEDURE — 80048 BASIC METABOLIC PNL TOTAL CA: CPT

## 2019-06-14 PROCEDURE — 84145 PROCALCITONIN (PCT): CPT

## 2019-06-14 PROCEDURE — 36415 COLL VENOUS BLD VENIPUNCTURE: CPT

## 2019-06-14 PROCEDURE — 96374 THER/PROPH/DIAG INJ IV PUSH: CPT

## 2019-06-14 PROCEDURE — 99231 SBSQ HOSP IP/OBS SF/LOW 25: CPT

## 2019-06-14 PROCEDURE — 36600 WITHDRAWAL OF ARTERIAL BLOOD: CPT

## 2019-06-14 PROCEDURE — 87040 BLOOD CULTURE FOR BACTERIA: CPT

## 2019-06-14 PROCEDURE — 94664 DEMO&/EVAL PT USE INHALER: CPT

## 2019-06-14 PROCEDURE — 94660 CPAP INITIATION&MGMT: CPT

## 2019-06-14 PROCEDURE — 99291 CRITICAL CARE FIRST HOUR: CPT | Mod: 25

## 2019-06-14 PROCEDURE — 85610 PROTHROMBIN TIME: CPT

## 2019-06-14 PROCEDURE — 99221 1ST HOSP IP/OBS SF/LOW 40: CPT

## 2019-06-14 PROCEDURE — 71045 X-RAY EXAM CHEST 1 VIEW: CPT

## 2019-06-14 PROCEDURE — 85027 COMPLETE CBC AUTOMATED: CPT

## 2019-06-14 PROCEDURE — 85379 FIBRIN DEGRADATION QUANT: CPT

## 2019-06-14 PROCEDURE — 82962 GLUCOSE BLOOD TEST: CPT

## 2019-06-14 PROCEDURE — 97161 PT EVAL LOW COMPLEX 20 MIN: CPT

## 2019-06-14 RX ORDER — AZITHROMYCIN 500 MG/1
1 TABLET, FILM COATED ORAL
Qty: 4 | Refills: 0
Start: 2019-06-14 | End: 2019-06-17

## 2019-06-14 RX ADMIN — Medication 81 MILLIGRAM(S): at 11:32

## 2019-06-14 RX ADMIN — CLOPIDOGREL BISULFATE 75 MILLIGRAM(S): 75 TABLET, FILM COATED ORAL at 11:32

## 2019-06-14 RX ADMIN — Medication 25 MILLIGRAM(S): at 05:12

## 2019-06-14 RX ADMIN — Medication 3: at 11:32

## 2019-06-14 RX ADMIN — Medication 2: at 07:51

## 2019-06-14 RX ADMIN — ENOXAPARIN SODIUM 40 MILLIGRAM(S): 100 INJECTION SUBCUTANEOUS at 11:32

## 2019-06-14 RX ADMIN — Medication 40 MILLIGRAM(S): at 11:32

## 2019-06-14 RX ADMIN — Medication 3 MILLILITER(S): at 08:13

## 2019-06-14 RX ADMIN — Medication 40 MILLIGRAM(S): at 05:12

## 2019-06-14 RX ADMIN — BUDESONIDE AND FORMOTEROL FUMARATE DIHYDRATE 2 PUFF(S): 160; 4.5 AEROSOL RESPIRATORY (INHALATION) at 05:13

## 2019-06-14 RX ADMIN — TIOTROPIUM BROMIDE 1 CAPSULE(S): 18 CAPSULE ORAL; RESPIRATORY (INHALATION) at 05:13

## 2019-06-14 RX ADMIN — Medication 3 MILLILITER(S): at 13:36

## 2019-06-14 RX ADMIN — VALSARTAN 80 MILLIGRAM(S): 80 TABLET ORAL at 05:12

## 2019-06-14 RX ADMIN — Medication 3 MILLILITER(S): at 02:01

## 2019-06-14 NOTE — DISCHARGE NOTE PROVIDER - CARE PROVIDER_API CALL
Arun Dejesus)  Critical Care Medicine; Internal Medicine; Pulmonary Disease  [address] [place] [address]  Phone: [phone]  Fax: [phone]  Follow Up Time:

## 2019-06-14 NOTE — DISCHARGE NOTE NURSING/CASE MANAGEMENT/SOCIAL WORK - NSDCDPATPORTLINK_GEN_ALL_CORE
You can access the [facility], offered by [facility], by registering with the following website: [email or web address]

## 2019-06-14 NOTE — PHYSICAL THERAPY INITIAL EVALUATION ADULT - ADDITIONAL COMMENTS
Pt lives with wife in private home + [address] with railing.  Pt's bedroom and bath are on second floor.  Pt was independent in all ADLs and independent in ambulation without device.

## 2019-06-14 NOTE — DISCHARGE NOTE PROVIDER - HOSPITAL COURSE
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 Per wife, patient has been doing fine since his discharge 3 weeks ago.  He was on prednisone and feeling well.  He recently was tapered off (of the doesn't exactly remember when).      In the ED, pt's abg showed pH 7.29, pCO2 62.  He received 125 mg of solumderol.  Duonebs was given.  He is currently on BiPAP.     Of note, he is a chronic Co2 retainer 2/2 COPD and uses BiPAP intermittently throughout the day.      Pt was speaking in full sentences to me while on BiPAP.      Patient received one dose of IV azythromycin. Patient 's respiratory status improved. Patient is stable to be discharged home.     Vital Signs Last 24 Hrs    T(C): 36.6 (14 Jun 2019 08:05), Max: 36.9 (14 Jun 2019 04:35)    T(F): 97.9 (14 Jun 2019 08:05), Max: 98.5 (14 Jun 2019 04:35)    HR: 97 (14 Jun 2019 11:03) (59 - 97)    BP: 138/76 (14 Jun 2019 11:03) (104/55 - 152/77)    BP(mean): --    RR: 17 (14 Jun 2019 08:05) (17 - 19)    SpO2: 97% (14 Jun 2019 11:03) (96% - 100%)        Physical Exam:    General: Well developed, well nourished, NAD    HEENT: NCAT, Discolored skin patch on head, PERRLA, EOMI bl, moist mucous membranes     Neck: Supple, nontender, no mass    Neurology: A&Ox3, nonfocal, CN II-XII grossly intact, sensation intact, no gait abnormalities     Respiratory: CTA B/L, No W/R/R    CV: RRR, +S1/S2, no murmurs, rubs or gallops    Abdominal: Soft, NT, ND +BSx4    Extremities: No C/C/E, + peripheral pulses    MSK: Normal ROM, no joint erythema or warmth, no joint swelling     Skin: warm, dry, normal, discolored skin patch on head Pt 78 yo m pmh with PMHx significant for HTN, COPD (On home O2 2L and BIPAP at while sleeping regardless of time of day), CAD w/ 3v CABG, HLD, DM2 (on Metformin, glipizide), hx Hypercapnic Resp Failure/Cardiac Arrest requiring intubation (6/'18), Presents to the ED for increased SOB since this morning. Per wife, patient has been doing fine since his discharge 3 weeks ago.  He was on prednisone and feeling well.  He recently was tapered off (of the doesn't exactly remember when).      In the ED, pt's abg showed pH 7.29, pCO2 62.  He received 125 mg of solumderol.  Duonebs was given.  He is currently on BiPAP.     Of note, he is a chronic Co2 retainer 2/2 COPD and uses BiPAP intermittently throughout the day.      Pt was speaking in full sentences to me while on BiPAP.      Patient received one dose of IV azythromycin and solumedrol. pt followed by pul. Patient 's respiratory status improved. Patient is stable to be discharged home.     Vital Signs Last 24 Hrs    T(C): 36.6 (14 Jun 2019 08:05), Max: 36.9 (14 Jun 2019 04:35)    T(F): 97.9 (14 Jun 2019 08:05), Max: 98.5 (14 Jun 2019 04:35)    HR: 97 (14 Jun 2019 11:03) (59 - 97)    BP: 138/76 (14 Jun 2019 11:03) (104/55 - 152/77)    BP(mean): --    RR: 17 (14 Jun 2019 08:05) (17 - 19)    SpO2: 97% (14 Jun 2019 11:03) (96% - 100%)        Physical Exam:    General: Well developed, well nourished, NAD    HEENT: NCAT, Discolored skin patch on head, PERRLA, EOMI bl, moist mucous membranes     Neck: Supple, nontender, no mass    Neurology: A&Ox3, nonfocal, CN II-XII grossly intact, sensation intact, no gait abnormalities     Respiratory: CTA B/L, No W/R/R    CV: RRR, +S1/S2, no murmurs, rubs or gallops    Abdominal: Soft, NT, ND +BSx4    Extremities: No C/C/E, + peripheral pulses    MSK: Normal ROM, no joint erythema or warmth, no joint swelling     Skin: warm, dry, normal, discolored skin patch on head

## 2019-06-14 NOTE — DISCHARGE NOTE PROVIDER - NSDCCPCAREPLAN_GEN_ALL_CORE_FT
PRINCIPAL DISCHARGE DIAGNOSIS  Diagnosis: COPD exacerbation  Assessment and Plan of Treatment: You had an exaceerbation of your underlying chronic obstructive pulmonary disease. You were treated with methylpednisolone, budesonide/formoterol and albuterol/ipatropium. You also received IV abx. Your respiratory status improved and you are stable to be discharged home. Please follow up with pulmonologist, Dr. Artem alvarado a week of discharge. If you develop fever, shortness of breath or any worrisome symptom please visit ED for further evaluation.      SECONDARY DISCHARGE DIAGNOSES  Diagnosis: Hypertension  Assessment and Plan of Treatment: Continue metoprolol home dose.    Diagnosis: Diabetes Mellitus, Type II  Assessment and Plan of Treatment: Continue your home dose of metformin and glipizide.    Diagnosis: BPH (benign prostatic hyperplasia)  Assessment and Plan of Treatment: Continue tamsulosin home dose. PRINCIPAL DISCHARGE DIAGNOSIS  Diagnosis: COPD exacerbation  Assessment and Plan of Treatment: You had an exaceerbation of your underlying chronic obstructive pulmonary disease. You were treated with methylpednisolone, budesonide/formoterol and albuterol/ipatropium. You also received IV abx. Your respiratory status improved and you are stable to be discharged home. Please follow up with pulmonologist, Dr. Oleg alvarado a week of discharge. If you develop fever, shortness of breath or any worrisome symptom please visit ED for further evaluation.      SECONDARY DISCHARGE DIAGNOSES  Diagnosis: Hypertension  Assessment and Plan of Treatment: Continue metoprolol home dose.    Diagnosis: Diabetes Mellitus, Type II  Assessment and Plan of Treatment: Continue your home dose of metformin and glipizide.    Diagnosis: BPH (benign prostatic hyperplasia)  Assessment and Plan of Treatment: Continue tamsulosin home dose.

## 2019-06-14 NOTE — PHYSICAL THERAPY INITIAL EVALUATION ADULT - PERTINENT HX OF CURRENT PROBLEM, REHAB EVAL
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

## 2019-06-14 NOTE — PROGRESS NOTE ADULT - SUBJECTIVE AND OBJECTIVE BOX
Patient chart was reviewed Patient has not been examined yet but will be done so later today and following that  the final note will be entered in the space below Workup and management orders based on current assessment have been entered to expedite patient care  Nursing notified for stat orders as applicable Meanwhile please refer to my previous Pulmonary Critical Care notes on this patient or call me directly Patient was examined Chart reviewed Detailed note will be inserted below after going over latest data Meanwhile please refer to my previous notes for Pulmonary/Critical Care assessment recommendations [facility] [place] P [phone]  1940 [address]A 2019 DR VESTA MALDONADO   ALLERGY Shellfish  CONTACT Sp Amira EATON    Initial evaluation/Pulmonary Critical Care consultation requested on  2019  by Dr Crockett   from Dr Dejesus     Patient examined chart reviewed    HOSPITAL ADMISSION   PATIENT CAME  FROM (if information available)        TYPE OF VISIT      Subsequent pulmonary followup      REASON FOR VISIT  For list of problems evaluated/addressed, please see problem list in the note below     PATIENT [facility] [place] P [phone]  1940 DOA 2019 DR VESTA MALDONADO     VITALS/LABS       2019 afeb 59 116/62 17 100% 84   2019 W 15.1 Hb 11.3 Plt 258 Na 141 K 5.2 CO2 32 Cr 1.2     PATIENT  [facility] [place] P [phone]  1940 DOA 2019 DR VESTA MALDONADO     MEDICATIONS     CAD   ASA 81 ()   plavix 75 ()   metoprolol 25.2 ()   valsartan 80 ()   atyorvastat 40 ()   DVT P   Lvnx 40 ()   DM   iss ()   ANTIBIO  azithro ()   COPD   duoneb.4 ()  Solumed 40.3 ()    BPH  Tamsulosin .4 ()    GLOBAL ISSUE/BEST PRACTICE:      PROBLEM: HOB elevation:   y            PROBLEM: Stress ulcer proph:    na                      PROBLEM: VTE prophylaxis: lvnx 40 ()        PROBLEM: Glycemic control:    na  PROBLEM: Nutrition:   cons carb ()   PROBLEM: Advanced directive: na     PROBLEM: Allergies:  na    REVIEW OF SYMPTOMS     NOTE Changess if any  in ROS and PE are updated in daily HOSPITAL COURSE below      Able to give ROS  Yes     RELIABLE No   CONSTITUTIONAL Weakness Yes  Chills No Vision changes No  ENDOCRINE No unexplained hair loss No heat or cold intolerance    ALLERGY No hives  Sore throat No   RESP Coughing blood no  Shortness of breath YES   NEURO No Headache  Confusion Pain neck No   CARDIAC No Chest pain No Palpitations   GI No Pain abdomen NO   Vomiting NO     PHYSICAL EXAM    HEENT Unremarkable PERRLA atraumatic   RESP Fair air entry EXP prolonged    Harsh breath sound Resp distres mild   CARDIAC S1 S2 No S3     NO JVD    ABDOMEN SOFT BS PRESENT NOT DISTENDED No hepatosplenomegaly PEDAL EDEMA present No calf tenderness  NO rash   GENERAL Not TOXIC

## 2019-06-14 NOTE — PROGRESS NOTE ADULT - ASSESSMENT
Pt 80 yo m pmh with PMHx significant for HTN, COPD (On home O2 2L and BIPAP at while sleeping regardless of time of day), CAD w/ 3v CABG, HLD, DM2 (on Metformin, glipizide), hx Hypercapnic Resp Failure/Cardiac Arrest requiring intubation (6/'18) being admitted for COPD exacerbation
PATIENT [facility]OD[facility] YUE [facility] P [phone]  1940 [address]A 2019 DR VESTA MALDONADO     PATIENT SUMMARY    79 m PMH HTN, DM2 (on home Metformin and glipizide), COPD (2L home/ BIPAP at night), CAD with 3v CABG 2004, HLD, 10/26/2018 ECHO ef 55% hx hypercapnic resp failure with ho intubation c/b with cardiac arrest (2018) with ho recurrent admissions GOLD D admitted [facility] P 2019 p with progressive resp distress Improved with BPAP     Home meds included  metorpolol 25.2 valsartan 80 plavx 75 asa breo 100 ventiolin incruse metformin 1000.2               PATIENT [facility]OD[facility] YUE [facility] P [phone]  1940 DOA 2019 DR VESTA MALDONADO     PROBLEM/ASSESSMENT/RECOMMENDATIONS (A/R)       HYPOXEMIC HYPERCAPNIC RESP FAILURE POA   2019 3a //6 731/57/132   2019 1a 730/57/101 30/18/6   2019 10p 2l729/62/86   A/R Gas excange suboptimal but acceptable Continue bpap noct and periodically daytime as needed  2019 Check abg in am and if good poss sc 6/15  RO VTE   2019 ddimer below 150   VTE less likely  RO MI  2019 Tr 1 n   COPD ex   On BD steroids   2019 Added azithro and symbicort   2019 Solumed decr 40  DC planning on 6/15 on pred 30 12 d taper (as pt states he does better while on pred)   RO RESP TRACT INFECTION  2019 proc n   [facility]     TIME SPENT Over 25 minutes aggregate care time spent on encounter; activities included   direct pt care, counseling and/or coordinating care reviewing notes, lab data/ imaging , discussion with multidisciplinary team/ pt /family. Risks, benefits, alternatives  discussed in detail

## 2019-06-18 LAB
CULTURE RESULTS: SIGNIFICANT CHANGE UP
SPECIMEN SOURCE: SIGNIFICANT CHANGE UP

## 2019-06-26 ENCOUNTER — APPOINTMENT (OUTPATIENT)
Dept: PULMONOLOGY | Facility: CLINIC | Age: 79
End: 2019-06-26
Payer: MEDICARE

## 2019-06-26 VITALS
BODY MASS INDEX: 28.98 KG/M2 | DIASTOLIC BLOOD PRESSURE: 74 MMHG | SYSTOLIC BLOOD PRESSURE: 129 MMHG | HEART RATE: 91 BPM | HEIGHT: 71 IN | WEIGHT: 207 LBS | OXYGEN SATURATION: 96 % | RESPIRATION RATE: 16 BRPM

## 2019-06-26 DIAGNOSIS — Z86.79 PERSONAL HISTORY OF OTHER DISEASES OF THE CIRCULATORY SYSTEM: ICD-10-CM

## 2019-06-26 DIAGNOSIS — J44.9 CHRONIC OBSTRUCTIVE PULMONARY DISEASE, UNSPECIFIED: ICD-10-CM

## 2019-06-26 DIAGNOSIS — Z86.39 PERSONAL HISTORY OF OTHER ENDOCRINE, NUTRITIONAL AND METABOLIC DISEASE: ICD-10-CM

## 2019-06-26 PROCEDURE — 99204 OFFICE O/P NEW MOD 45 MIN: CPT

## 2019-06-26 NOTE — REASON FOR VISIT
[Acute] : an acute visit [COPD] : COPD [Spouse] : spouse [Shortness of Breath] : shortness of Breath

## 2019-06-26 NOTE — HISTORY OF PRESENT ILLNESS
[Feelings Of Weakness On Exertion] : stable exercise intolerance [Difficulty Breathing During Exertion] : stable dyspnea on exertion [Wheezing] : stable wheezing [Oxygen] : the patient uses supplemental oxygen [Former] : is a former smoker [7  -  Very severe] : 7, very severe [Class IV - Severe Limitations, Symptoms at Rest] : IV

## 2019-07-05 NOTE — ASSESSMENT
[FreeTextEntry1] : \par PATIENT YUE COTTO [birth year](79y)M  MRN [id number]  Other2 [phone]  H Phone [phone]  W Phone   \par \par DATE OF VISIT \par 2019\par \par REASON FOR VISIT\par Please see   active pulmonary problems \par \par REFERING MD \par Dr Sarah Avila\par \par TYPE OF VISIT \par Initial  Pulmonary evaluation \par \par REASON FOR VISIT\par Please see   active pulmonary problems \par \par REFERING MD \par Dr Sarah Avila\par \par TYPE OF VISIT \par Initial  Pulmonary evaluation \par \par PATIENT YUE COTTO [birth year](79y)M  MRN [id number]  Other2 [phone]  H Phone [phone]  W Phone   \par \par DATE INTERVAL HISTORY/CC/NOTEWORTHY ROS AND PE\par 2019 Pt wife insisted that he should be on prednisone  They were explained the pros and cons and explained that the bulk of literature does not feel that oral steroids are a good choice in [place] However she insisted that I start and we started Pred 10 eod end 2019  \par \par PATIENT YUE COTTO [birth year](79y)M  MRN [id number]  Other2 [phone]  H Phone [phone]  W Phone   \par                                     \par PMH  CAD D CHF      \par PSH             CABG                                    \par HPI           79 m referred by Dr Avila for COPD chr resp failure recurrent hospital stays Has home O2 Home niv Home neb                                                  \par \par PATIENT YUE COTTO [birth year](79y)M  MRN [id number]  Other2 [phone]  H Phone [phone]  W Phone  \par \par MEDICATIONS/MANAGEMENT  \par Breo 200 \par Incruse \par Daliresp\par Glipizide 2.5x2 \par metformin 1000.2 \par Lopressor 12.5x2 \par Valsart 80  \par Tamsulosin .4 \par Atorvastat 40 \par Plavix 75  \par \par PATIENT YUE COTTO DOB [birth year](79y)M  MRN [id number]  Other2 [phone]  H Phone [phone]  W Phone   \par \par BEST PRACTICE \par \par STATUS OF IMMUNIZATION ASSESSMENT/RECOMMENDATIONS \par Immunization is uptodate \par \par \par CIGARETTE SMOKING STATUS ASSESSMENT/RECOMMENDATIONS \par Is nonsmoker\par \par \par PATIENT YUE COTTO  [birth year](79y)M  MRN [id number]  Other2 [phone]  H Phone [phone]  W Phone   \par \par ACTIVE PROBLEMS\par PROBLEMS ASSESSMENT/RECOMMENDATIONS (A/R) \par COPD \par Has advanced COPD Will start petersen including PFTs necxt visist Pt needs portable O2 concentrator Ordered DW   Needs insurance auth for home niv \par 2018 bpap /.5 730/64/190 \par Will benefit from Home niv to prevent frequent hospoital stays and to decrease hypercapnia  Ordered\par Was insisting that he be started on Home pred despite risks Pros cons dw pt and spoiuse Started end 2019 Pred 10 eod \par \par \par TIME SPENT \par Over 55 minutes aggregate care time spent on encounter; activities included   direct pt care, counseling and/or coordinating care reviewing notes, lab data/ imaging , discussion with multidisciplinary team/ pt  /family and explaining in detail risks, benefits, alternatives  of the recommendations \par \par \par

## 2019-07-05 NOTE — REVIEW OF SYSTEMS
[Fatigue] : fatigue [Cough] : cough [Dyspnea] : dyspnea [Wheezing] : wheezing [Orthopnea] : orthopnea [Negative] : Genitourinary [Poor Appetite] : normal appetite  [Sputum] : not coughing up ~M sputum [Hemoptysis] : no hemoptysis

## 2019-07-05 NOTE — PHYSICAL EXAM
[General Appearance - Well Developed] : well developed [Normal Conjunctiva] : the conjunctiva exhibited no abnormalities [Eyelids - No Xanthelasma] : the eyelids demonstrated no xanthelasmas [Neck Appearance] : the appearance of the neck was normal [Neck Cervical Mass (___cm)] : no neck mass was observed [Nail Clubbing] : no clubbing of the fingernails [Thyroid Diffuse Enlargement] : the thyroid was not enlarged [Jugular Venous Distention Increased] : there was no jugular-venous distention [1+ Pitting] : 1+  pitting [Normal Oropharynx] : abnormal oropharynx [FreeTextEntry1] : DYSPNEIC AT REST

## 2019-07-05 NOTE — CONSULT LETTER
[Consult Letter:] : I had the pleasure of evaluating your patient, [unfilled]. [Dear  ___] : Dear  [unfilled], [Please see my note below.] : Please see my note below. [Consult Closing:] : Thank you very much for allowing me to participate in the care of this patient.  If you have any questions, please do not hesitate to contact me. [FreeTextEntry3] : Yours truly,\par \par Arun Dejesus MD\par

## 2019-07-10 ENCOUNTER — EMERGENCY (EMERGENCY)
Facility: HOSPITAL | Age: 79
LOS: 1 days | Discharge: HOME | End: 2019-07-10
Attending: EMERGENCY MEDICINE | Admitting: EMERGENCY MEDICINE
Payer: COMMERCIAL

## 2019-07-10 VITALS
TEMPERATURE: 99 F | OXYGEN SATURATION: 96 % | DIASTOLIC BLOOD PRESSURE: 78 MMHG | RESPIRATION RATE: 18 BRPM | SYSTOLIC BLOOD PRESSURE: 121 MMHG | HEART RATE: 98 BPM

## 2019-07-10 VITALS
HEIGHT: 71 IN | RESPIRATION RATE: 16 BRPM | OXYGEN SATURATION: 97 % | WEIGHT: 207.01 LBS | DIASTOLIC BLOOD PRESSURE: 77 MMHG | HEART RATE: 100 BPM | TEMPERATURE: 98 F | SYSTOLIC BLOOD PRESSURE: 124 MMHG

## 2019-07-10 DIAGNOSIS — T14.8XXA OTHER INJURY OF UNSPECIFIED BODY REGION, INITIAL ENCOUNTER: Chronic | ICD-10-CM

## 2019-07-10 LAB
ALBUMIN SERPL ELPH-MCNC: 3.6 G/DL — SIGNIFICANT CHANGE UP (ref 3.3–5)
ALP SERPL-CCNC: 97 U/L — SIGNIFICANT CHANGE UP (ref 40–120)
ALT FLD-CCNC: 26 U/L — SIGNIFICANT CHANGE UP (ref 12–78)
ANION GAP SERPL CALC-SCNC: 4 MMOL/L — LOW (ref 5–17)
APTT BLD: 38.2 SEC — HIGH (ref 28.5–37)
AST SERPL-CCNC: 23 U/L — SIGNIFICANT CHANGE UP (ref 15–37)
BASE EXCESS BLDA CALC-SCNC: 4.8 MMOL/L — HIGH (ref -2–2)
BASOPHILS # BLD AUTO: 0.03 K/UL — SIGNIFICANT CHANGE UP (ref 0–0.2)
BASOPHILS NFR BLD AUTO: 0.4 % — SIGNIFICANT CHANGE UP (ref 0–2)
BILIRUB SERPL-MCNC: 0.3 MG/DL — SIGNIFICANT CHANGE UP (ref 0.2–1.2)
BLOOD GAS COMMENTS ARTERIAL: SIGNIFICANT CHANGE UP
BUN SERPL-MCNC: 14 MG/DL — SIGNIFICANT CHANGE UP (ref 7–23)
CALCIUM SERPL-MCNC: 10 MG/DL — SIGNIFICANT CHANGE UP (ref 8.5–10.1)
CHLORIDE SERPL-SCNC: 104 MMOL/L — SIGNIFICANT CHANGE UP (ref 96–108)
CO2 SERPL-SCNC: 34 MMOL/L — HIGH (ref 22–31)
CREAT SERPL-MCNC: 1.1 MG/DL — SIGNIFICANT CHANGE UP (ref 0.5–1.3)
EOSINOPHIL # BLD AUTO: 0.22 K/UL — SIGNIFICANT CHANGE UP (ref 0–0.5)
EOSINOPHIL NFR BLD AUTO: 3.2 % — SIGNIFICANT CHANGE UP (ref 0–6)
GLUCOSE SERPL-MCNC: 171 MG/DL — HIGH (ref 70–99)
HCO3 BLDA-SCNC: 29 MMOL/L — HIGH (ref 23–27)
HCT VFR BLD CALC: 42.1 % — SIGNIFICANT CHANGE UP (ref 39–50)
HGB BLD-MCNC: 13 G/DL — SIGNIFICANT CHANGE UP (ref 13–17)
HOROWITZ INDEX BLDA+IHG-RTO: 28 — SIGNIFICANT CHANGE UP
IMM GRANULOCYTES NFR BLD AUTO: 0.4 % — SIGNIFICANT CHANGE UP (ref 0–1.5)
INR BLD: 0.97 RATIO — SIGNIFICANT CHANGE UP (ref 0.88–1.16)
LACTATE SERPL-SCNC: 1 MMOL/L — SIGNIFICANT CHANGE UP (ref 0.7–2)
LYMPHOCYTES # BLD AUTO: 0.85 K/UL — LOW (ref 1–3.3)
LYMPHOCYTES # BLD AUTO: 12.4 % — LOW (ref 13–44)
MCHC RBC-ENTMCNC: 27.4 PG — SIGNIFICANT CHANGE UP (ref 27–34)
MCHC RBC-ENTMCNC: 30.9 GM/DL — LOW (ref 32–36)
MCV RBC AUTO: 88.8 FL — SIGNIFICANT CHANGE UP (ref 80–100)
MONOCYTES # BLD AUTO: 0.34 K/UL — SIGNIFICANT CHANGE UP (ref 0–0.9)
MONOCYTES NFR BLD AUTO: 4.9 % — SIGNIFICANT CHANGE UP (ref 2–14)
NEUTROPHILS # BLD AUTO: 5.41 K/UL — SIGNIFICANT CHANGE UP (ref 1.8–7.4)
NEUTROPHILS NFR BLD AUTO: 78.7 % — HIGH (ref 43–77)
NRBC # BLD: 0 /100 WBCS — SIGNIFICANT CHANGE UP (ref 0–0)
NT-PROBNP SERPL-SCNC: 92 PG/ML — SIGNIFICANT CHANGE UP (ref 0–450)
PCO2 BLDA: 36 MMHG — SIGNIFICANT CHANGE UP (ref 32–46)
PH BLDA: 7.5 — HIGH (ref 7.35–7.45)
PLATELET # BLD AUTO: 300 K/UL — SIGNIFICANT CHANGE UP (ref 150–400)
PO2 BLDA: 180 MMHG — HIGH (ref 74–108)
POTASSIUM SERPL-MCNC: 4.8 MMOL/L — SIGNIFICANT CHANGE UP (ref 3.5–5.3)
POTASSIUM SERPL-SCNC: 4.8 MMOL/L — SIGNIFICANT CHANGE UP (ref 3.5–5.3)
PROT SERPL-MCNC: 6.9 G/DL — SIGNIFICANT CHANGE UP (ref 6–8.3)
PROTHROM AB SERPL-ACNC: 11.1 SEC — SIGNIFICANT CHANGE UP (ref 10–12.9)
RBC # BLD: 4.74 M/UL — SIGNIFICANT CHANGE UP (ref 4.2–5.8)
RBC # FLD: 13.2 % — SIGNIFICANT CHANGE UP (ref 10.3–14.5)
SAO2 % BLDA: 100 % — HIGH (ref 92–96)
SODIUM SERPL-SCNC: 142 MMOL/L — SIGNIFICANT CHANGE UP (ref 135–145)
TROPONIN I SERPL-MCNC: <.015 NG/ML — SIGNIFICANT CHANGE UP (ref 0.01–0.04)
WBC # BLD: 6.88 K/UL — SIGNIFICANT CHANGE UP (ref 3.8–10.5)
WBC # FLD AUTO: 6.88 K/UL — SIGNIFICANT CHANGE UP (ref 3.8–10.5)

## 2019-07-10 PROCEDURE — 71045 X-RAY EXAM CHEST 1 VIEW: CPT

## 2019-07-10 PROCEDURE — 85027 COMPLETE CBC AUTOMATED: CPT

## 2019-07-10 PROCEDURE — 71045 X-RAY EXAM CHEST 1 VIEW: CPT | Mod: 26

## 2019-07-10 PROCEDURE — 94640 AIRWAY INHALATION TREATMENT: CPT

## 2019-07-10 PROCEDURE — 99284 EMERGENCY DEPT VISIT MOD MDM: CPT | Mod: 25

## 2019-07-10 PROCEDURE — 80053 COMPREHEN METABOLIC PANEL: CPT

## 2019-07-10 PROCEDURE — 93005 ELECTROCARDIOGRAM TRACING: CPT

## 2019-07-10 PROCEDURE — 85610 PROTHROMBIN TIME: CPT

## 2019-07-10 PROCEDURE — 82803 BLOOD GASES ANY COMBINATION: CPT

## 2019-07-10 PROCEDURE — 93010 ELECTROCARDIOGRAM REPORT: CPT

## 2019-07-10 PROCEDURE — 85730 THROMBOPLASTIN TIME PARTIAL: CPT

## 2019-07-10 PROCEDURE — 36415 COLL VENOUS BLD VENIPUNCTURE: CPT

## 2019-07-10 PROCEDURE — 96374 THER/PROPH/DIAG INJ IV PUSH: CPT

## 2019-07-10 PROCEDURE — 99284 EMERGENCY DEPT VISIT MOD MDM: CPT

## 2019-07-10 PROCEDURE — 87040 BLOOD CULTURE FOR BACTERIA: CPT

## 2019-07-10 PROCEDURE — 84484 ASSAY OF TROPONIN QUANT: CPT

## 2019-07-10 PROCEDURE — 83605 ASSAY OF LACTIC ACID: CPT

## 2019-07-10 PROCEDURE — 83880 ASSAY OF NATRIURETIC PEPTIDE: CPT

## 2019-07-10 RX ORDER — IPRATROPIUM/ALBUTEROL SULFATE 18-103MCG
3 AEROSOL WITH ADAPTER (GRAM) INHALATION EVERY 6 HOURS
Refills: 0 | Status: DISCONTINUED | OUTPATIENT
Start: 2019-07-10 | End: 2019-07-13

## 2019-07-10 RX ORDER — IPRATROPIUM/ALBUTEROL SULFATE 18-103MCG
3 AEROSOL WITH ADAPTER (GRAM) INHALATION ONCE
Refills: 0 | Status: COMPLETED | OUTPATIENT
Start: 2019-07-10 | End: 2019-07-10

## 2019-07-10 RX ORDER — ALBUTEROL 90 UG/1
2.5 AEROSOL, METERED ORAL ONCE
Refills: 0 | Status: COMPLETED | OUTPATIENT
Start: 2019-07-10 | End: 2019-07-10

## 2019-07-10 RX ADMIN — Medication 3 MILLILITER(S): at 17:13

## 2019-07-10 RX ADMIN — Medication 3 MILLILITER(S): at 19:37

## 2019-07-10 RX ADMIN — Medication 125 MILLIGRAM(S): at 17:39

## 2019-07-10 NOTE — ED PROVIDER NOTE - PROGRESS NOTE DETAILS
labs reviewed cxr wnl  pt offered admission pt states he feels better mild wheezing still noted states that is chronic wants to go home  spoke with Dr. Oleg ashley advised f/u

## 2019-07-10 NOTE — ED PROVIDER NOTE - ATTENDING CONTRIBUTION TO CARE
I have personally performed a face to face diagnostic evaluation on this patient.  I have reviewed the PA note and agree with the history, exam, and plan of care, except as noted.  History and Exam by me shows  baseline sob due to copd but worse today.  no cp. Home O2 2l/min.  lungs- decreased bs diffusely.  pending lab and reevaluation.

## 2019-07-10 NOTE — ED PROVIDER NOTE - CONSTITUTIONAL, MLM
normal... Well appearing, well nourished, awake, alert, oriented to person, place, time/situation and in no apparent distress. on O2

## 2019-07-10 NOTE — ED PROVIDER NOTE - CLINICAL SUMMARY MEDICAL DECISION MAKING FREE TEXT BOX
Pt is a 80 yo male with pmhx of copd cad here for sob   labs abg  cxr  neb tx solumedrol  reeval   Pulm Dr. Dejesus

## 2019-07-10 NOTE — ED PROVIDER NOTE - CARE PROVIDER_API CALL
Arun Dejesus)  Critical Care Medicine; Internal Medicine; Pulmonary Disease  [address] [place] [address]  Phone: [phone]  Fax: [phone]  Follow Up Time:

## 2019-07-10 NOTE — ED PROVIDER NOTE - OBJECTIVE STATEMENT
Pt 80 yo m pmh with PMHx significant for HTN, COPD (On home O2 2L and BIPAP), CAD w/ 3v CABG, HLD, DM2 (on Metformin, glipizide), hx Hypercapnic Resp Failure/Cardiac Arrest requiring intubation (6/'18), Presents to the ED for increased SOB since this morning. Pt states he had sob yesterday which resolved and returned today no fever no leg swelling + cough no chest pain. Pt states feels like copd    pcp olga wilson

## 2019-07-22 ENCOUNTER — INPATIENT (INPATIENT)
Facility: HOSPITAL | Age: 79
LOS: 1 days | Discharge: HOME | DRG: 189 | End: 2019-07-24
Attending: INTERNAL MEDICINE | Admitting: INTERNAL MEDICINE
Payer: COMMERCIAL

## 2019-07-22 VITALS
WEIGHT: 207.01 LBS | OXYGEN SATURATION: 97 % | SYSTOLIC BLOOD PRESSURE: 149 MMHG | TEMPERATURE: 98 F | RESPIRATION RATE: 18 BRPM | DIASTOLIC BLOOD PRESSURE: 79 MMHG | HEART RATE: 110 BPM | HEIGHT: 71 IN

## 2019-07-22 DIAGNOSIS — T14.8XXA OTHER INJURY OF UNSPECIFIED BODY REGION, INITIAL ENCOUNTER: Chronic | ICD-10-CM

## 2019-07-22 DIAGNOSIS — J44.1 CHRONIC OBSTRUCTIVE PULMONARY DISEASE WITH (ACUTE) EXACERBATION: ICD-10-CM

## 2019-07-22 DIAGNOSIS — R09.89 OTHER SPECIFIED SYMPTOMS AND SIGNS INVOLVING THE CIRCULATORY AND RESPIRATORY SYSTEMS: ICD-10-CM

## 2019-07-22 DIAGNOSIS — E11.65 TYPE 2 DIABETES MELLITUS WITH HYPERGLYCEMIA: ICD-10-CM

## 2019-07-22 DIAGNOSIS — R74.8 ABNORMAL LEVELS OF OTHER SERUM ENZYMES: ICD-10-CM

## 2019-07-22 DIAGNOSIS — J96.02 ACUTE RESPIRATORY FAILURE WITH HYPERCAPNIA: ICD-10-CM

## 2019-07-22 LAB
ALBUMIN SERPL ELPH-MCNC: 3.8 G/DL — SIGNIFICANT CHANGE UP (ref 3.3–5)
ALP SERPL-CCNC: 87 U/L — SIGNIFICANT CHANGE UP (ref 40–120)
ALT FLD-CCNC: 34 U/L — SIGNIFICANT CHANGE UP (ref 12–78)
ANION GAP SERPL CALC-SCNC: 5 MMOL/L — SIGNIFICANT CHANGE UP (ref 5–17)
APTT BLD: 32.4 SEC — SIGNIFICANT CHANGE UP (ref 28.5–37)
AST SERPL-CCNC: 19 U/L — SIGNIFICANT CHANGE UP (ref 15–37)
BASE EXCESS BLDA CALC-SCNC: 0.7 MMOL/L — SIGNIFICANT CHANGE UP (ref -2–2)
BASE EXCESS BLDA CALC-SCNC: 1.2 MMOL/L — SIGNIFICANT CHANGE UP (ref -2–2)
BASE EXCESS BLDA CALC-SCNC: 1.5 MMOL/L — SIGNIFICANT CHANGE UP (ref -2–2)
BASE EXCESS BLDA CALC-SCNC: 1.7 MMOL/L — SIGNIFICANT CHANGE UP (ref -2–2)
BASOPHILS # BLD AUTO: 0.07 K/UL — SIGNIFICANT CHANGE UP (ref 0–0.2)
BASOPHILS NFR BLD AUTO: 0.4 % — SIGNIFICANT CHANGE UP (ref 0–2)
BILIRUB SERPL-MCNC: 0.3 MG/DL — SIGNIFICANT CHANGE UP (ref 0.2–1.2)
BLOOD GAS COMMENTS ARTERIAL: SIGNIFICANT CHANGE UP
BUN SERPL-MCNC: 18 MG/DL — SIGNIFICANT CHANGE UP (ref 7–23)
CALCIUM SERPL-MCNC: 9.1 MG/DL — SIGNIFICANT CHANGE UP (ref 8.5–10.1)
CHLORIDE SERPL-SCNC: 108 MMOL/L — SIGNIFICANT CHANGE UP (ref 96–108)
CO2 SERPL-SCNC: 28 MMOL/L — SIGNIFICANT CHANGE UP (ref 22–31)
CREAT SERPL-MCNC: 1.2 MG/DL — SIGNIFICANT CHANGE UP (ref 0.5–1.3)
EOSINOPHIL # BLD AUTO: 0.32 K/UL — SIGNIFICANT CHANGE UP (ref 0–0.5)
EOSINOPHIL NFR BLD AUTO: 2 % — SIGNIFICANT CHANGE UP (ref 0–6)
GLUCOSE SERPL-MCNC: 222 MG/DL — HIGH (ref 70–99)
HCO3 BLDA-SCNC: 24 MMOL/L — SIGNIFICANT CHANGE UP (ref 23–27)
HCO3 BLDA-SCNC: 25 MMOL/L — SIGNIFICANT CHANGE UP (ref 23–27)
HCT VFR BLD CALC: 43 % — SIGNIFICANT CHANGE UP (ref 39–50)
HGB BLD-MCNC: 13.3 G/DL — SIGNIFICANT CHANGE UP (ref 13–17)
HOROWITZ INDEX BLDA+IHG-RTO: 30 — SIGNIFICANT CHANGE UP
HOROWITZ INDEX BLDA+IHG-RTO: 30 — SIGNIFICANT CHANGE UP
HOROWITZ INDEX BLDA+IHG-RTO: 35 — SIGNIFICANT CHANGE UP
HOROWITZ INDEX BLDA+IHG-RTO: 50 — SIGNIFICANT CHANGE UP
IMM GRANULOCYTES NFR BLD AUTO: 1.2 % — SIGNIFICANT CHANGE UP (ref 0–1.5)
INR BLD: 0.9 RATIO — SIGNIFICANT CHANGE UP (ref 0.88–1.16)
LACTATE SERPL-SCNC: 1.2 MMOL/L — SIGNIFICANT CHANGE UP (ref 0.7–2)
LYMPHOCYTES # BLD AUTO: 1.28 K/UL — SIGNIFICANT CHANGE UP (ref 1–3.3)
LYMPHOCYTES # BLD AUTO: 8 % — LOW (ref 13–44)
MCHC RBC-ENTMCNC: 28.1 PG — SIGNIFICANT CHANGE UP (ref 27–34)
MCHC RBC-ENTMCNC: 30.9 GM/DL — LOW (ref 32–36)
MCV RBC AUTO: 90.9 FL — SIGNIFICANT CHANGE UP (ref 80–100)
MONOCYTES # BLD AUTO: 0.62 K/UL — SIGNIFICANT CHANGE UP (ref 0–0.9)
MONOCYTES NFR BLD AUTO: 3.9 % — SIGNIFICANT CHANGE UP (ref 2–14)
NEUTROPHILS # BLD AUTO: 13.57 K/UL — HIGH (ref 1.8–7.4)
NEUTROPHILS NFR BLD AUTO: 84.5 % — HIGH (ref 43–77)
NRBC # BLD: 0 /100 WBCS — SIGNIFICANT CHANGE UP (ref 0–0)
PCO2 BLDA: 51 MMHG — HIGH (ref 32–46)
PCO2 BLDA: 60 MMHG — HIGH (ref 32–46)
PCO2 BLDA: 66 MMHG — HIGH (ref 32–46)
PCO2 BLDA: 74 MMHG — CRITICAL HIGH (ref 32–46)
PH BLDA: 7.21 — LOW (ref 7.35–7.45)
PH BLDA: 7.24 — LOW (ref 7.35–7.45)
PH BLDA: 7.28 — LOW (ref 7.35–7.45)
PH BLDA: 7.34 — LOW (ref 7.35–7.45)
PLATELET # BLD AUTO: 235 K/UL — SIGNIFICANT CHANGE UP (ref 150–400)
PO2 BLDA: 112 MMHG — HIGH (ref 74–108)
PO2 BLDA: 118 MMHG — HIGH (ref 74–108)
PO2 BLDA: 133 MMHG — HIGH (ref 74–108)
PO2 BLDA: 199 MMHG — HIGH (ref 74–108)
POTASSIUM SERPL-MCNC: 5 MMOL/L — SIGNIFICANT CHANGE UP (ref 3.5–5.3)
POTASSIUM SERPL-SCNC: 5 MMOL/L — SIGNIFICANT CHANGE UP (ref 3.5–5.3)
PROT SERPL-MCNC: 6.8 G/DL — SIGNIFICANT CHANGE UP (ref 6–8.3)
PROTHROM AB SERPL-ACNC: 10.2 SEC — SIGNIFICANT CHANGE UP (ref 10–12.9)
RBC # BLD: 4.73 M/UL — SIGNIFICANT CHANGE UP (ref 4.2–5.8)
RBC # FLD: 13.7 % — SIGNIFICANT CHANGE UP (ref 10.3–14.5)
SAO2 % BLDA: 100 % — HIGH (ref 92–96)
SAO2 % BLDA: 97 % — HIGH (ref 92–96)
SAO2 % BLDA: 98 % — HIGH (ref 92–96)
SAO2 % BLDA: 99 % — HIGH (ref 92–96)
SODIUM SERPL-SCNC: 141 MMOL/L — SIGNIFICANT CHANGE UP (ref 135–145)
TROPONIN I SERPL-MCNC: <.015 NG/ML — SIGNIFICANT CHANGE UP (ref 0.01–0.04)
WBC # BLD: 16.06 K/UL — HIGH (ref 3.8–10.5)
WBC # FLD AUTO: 16.06 K/UL — HIGH (ref 3.8–10.5)

## 2019-07-22 PROCEDURE — 71045 X-RAY EXAM CHEST 1 VIEW: CPT | Mod: 26

## 2019-07-22 PROCEDURE — 99291 CRITICAL CARE FIRST HOUR: CPT

## 2019-07-22 PROCEDURE — 99223 1ST HOSP IP/OBS HIGH 75: CPT

## 2019-07-22 PROCEDURE — 93010 ELECTROCARDIOGRAM REPORT: CPT

## 2019-07-22 PROCEDURE — 71275 CT ANGIOGRAPHY CHEST: CPT | Mod: 26

## 2019-07-22 PROCEDURE — 93970 EXTREMITY STUDY: CPT | Mod: 26

## 2019-07-22 RX ORDER — AZITHROMYCIN 500 MG/1
TABLET, FILM COATED ORAL
Refills: 0 | Status: DISCONTINUED | OUTPATIENT
Start: 2019-07-22 | End: 2019-07-24

## 2019-07-22 RX ORDER — AZITHROMYCIN 500 MG/1
500 TABLET, FILM COATED ORAL EVERY 24 HOURS
Refills: 0 | Status: DISCONTINUED | OUTPATIENT
Start: 2019-07-23 | End: 2019-07-24

## 2019-07-22 RX ORDER — IPRATROPIUM/ALBUTEROL SULFATE 18-103MCG
3 AEROSOL WITH ADAPTER (GRAM) INHALATION ONCE
Refills: 0 | Status: COMPLETED | OUTPATIENT
Start: 2019-07-22 | End: 2019-07-22

## 2019-07-22 RX ORDER — BUDESONIDE, MICRONIZED 100 %
0.5 POWDER (GRAM) MISCELLANEOUS
Refills: 0 | Status: DISCONTINUED | OUTPATIENT
Start: 2019-07-22 | End: 2019-07-24

## 2019-07-22 RX ORDER — ENOXAPARIN SODIUM 100 MG/ML
40 INJECTION SUBCUTANEOUS DAILY
Refills: 0 | Status: DISCONTINUED | OUTPATIENT
Start: 2019-07-22 | End: 2019-07-24

## 2019-07-22 RX ORDER — SODIUM CHLORIDE 9 MG/ML
1000 INJECTION, SOLUTION INTRAVENOUS
Refills: 0 | Status: DISCONTINUED | OUTPATIENT
Start: 2019-07-22 | End: 2019-07-22

## 2019-07-22 RX ORDER — ATORVASTATIN CALCIUM 80 MG/1
40 TABLET, FILM COATED ORAL AT BEDTIME
Refills: 0 | Status: DISCONTINUED | OUTPATIENT
Start: 2019-07-22 | End: 2019-07-22

## 2019-07-22 RX ORDER — INSULIN LISPRO 100/ML
VIAL (ML) SUBCUTANEOUS EVERY 6 HOURS
Refills: 0 | Status: DISCONTINUED | OUTPATIENT
Start: 2019-07-22 | End: 2019-07-24

## 2019-07-22 RX ORDER — DEXTROSE 50 % IN WATER 50 %
12.5 SYRINGE (ML) INTRAVENOUS ONCE
Refills: 0 | Status: DISCONTINUED | OUTPATIENT
Start: 2019-07-22 | End: 2019-07-24

## 2019-07-22 RX ORDER — ASPIRIN/CALCIUM CARB/MAGNESIUM 324 MG
81 TABLET ORAL DAILY
Refills: 0 | Status: DISCONTINUED | OUTPATIENT
Start: 2019-07-22 | End: 2019-07-24

## 2019-07-22 RX ORDER — GLUCAGON INJECTION, SOLUTION 0.5 MG/.1ML
1 INJECTION, SOLUTION SUBCUTANEOUS ONCE
Refills: 0 | Status: DISCONTINUED | OUTPATIENT
Start: 2019-07-22 | End: 2019-07-24

## 2019-07-22 RX ORDER — DEXTROSE 50 % IN WATER 50 %
15 SYRINGE (ML) INTRAVENOUS ONCE
Refills: 0 | Status: DISCONTINUED | OUTPATIENT
Start: 2019-07-22 | End: 2019-07-24

## 2019-07-22 RX ORDER — METOPROLOL TARTRATE 50 MG
25 TABLET ORAL
Refills: 0 | Status: DISCONTINUED | OUTPATIENT
Start: 2019-07-23 | End: 2019-07-24

## 2019-07-22 RX ORDER — DEXTROSE 50 % IN WATER 50 %
25 SYRINGE (ML) INTRAVENOUS ONCE
Refills: 0 | Status: DISCONTINUED | OUTPATIENT
Start: 2019-07-22 | End: 2019-07-24

## 2019-07-22 RX ORDER — TAMSULOSIN HYDROCHLORIDE 0.4 MG/1
0.4 CAPSULE ORAL AT BEDTIME
Refills: 0 | Status: DISCONTINUED | OUTPATIENT
Start: 2019-07-22 | End: 2019-07-22

## 2019-07-22 RX ORDER — IPRATROPIUM/ALBUTEROL SULFATE 18-103MCG
3 AEROSOL WITH ADAPTER (GRAM) INHALATION EVERY 4 HOURS
Refills: 0 | Status: DISCONTINUED | OUTPATIENT
Start: 2019-07-22 | End: 2019-07-24

## 2019-07-22 RX ORDER — ATORVASTATIN CALCIUM 80 MG/1
40 TABLET, FILM COATED ORAL AT BEDTIME
Refills: 0 | Status: DISCONTINUED | OUTPATIENT
Start: 2019-07-23 | End: 2019-07-24

## 2019-07-22 RX ORDER — CLOPIDOGREL BISULFATE 75 MG/1
75 TABLET, FILM COATED ORAL DAILY
Refills: 0 | Status: DISCONTINUED | OUTPATIENT
Start: 2019-07-22 | End: 2019-07-24

## 2019-07-22 RX ORDER — SODIUM CHLORIDE 9 MG/ML
1000 INJECTION INTRAMUSCULAR; INTRAVENOUS; SUBCUTANEOUS
Refills: 0 | Status: DISCONTINUED | OUTPATIENT
Start: 2019-07-22 | End: 2019-07-24

## 2019-07-22 RX ORDER — AZITHROMYCIN 500 MG/1
500 TABLET, FILM COATED ORAL ONCE
Refills: 0 | Status: COMPLETED | OUTPATIENT
Start: 2019-07-22 | End: 2019-07-22

## 2019-07-22 RX ORDER — SODIUM CHLORIDE 9 MG/ML
1000 INJECTION INTRAMUSCULAR; INTRAVENOUS; SUBCUTANEOUS ONCE
Refills: 0 | Status: COMPLETED | OUTPATIENT
Start: 2019-07-22 | End: 2019-07-22

## 2019-07-22 RX ORDER — INSULIN LISPRO 100/ML
VIAL (ML) SUBCUTANEOUS
Refills: 0 | Status: DISCONTINUED | OUTPATIENT
Start: 2019-07-22 | End: 2019-07-22

## 2019-07-22 RX ORDER — METOPROLOL TARTRATE 50 MG
25 TABLET ORAL
Refills: 0 | Status: DISCONTINUED | OUTPATIENT
Start: 2019-07-22 | End: 2019-07-22

## 2019-07-22 RX ORDER — TAMSULOSIN HYDROCHLORIDE 0.4 MG/1
0.4 CAPSULE ORAL AT BEDTIME
Refills: 0 | Status: DISCONTINUED | OUTPATIENT
Start: 2019-07-23 | End: 2019-07-24

## 2019-07-22 RX ORDER — SODIUM CHLORIDE 9 MG/ML
1000 INJECTION, SOLUTION INTRAVENOUS
Refills: 0 | Status: DISCONTINUED | OUTPATIENT
Start: 2019-07-22 | End: 2019-07-24

## 2019-07-22 RX ADMIN — Medication 3 MILLILITER(S): at 19:27

## 2019-07-22 RX ADMIN — Medication 3 MILLILITER(S): at 15:00

## 2019-07-22 RX ADMIN — SODIUM CHLORIDE 50 MILLILITER(S): 9 INJECTION, SOLUTION INTRAVENOUS at 13:07

## 2019-07-22 RX ADMIN — Medication 0.5 MILLIGRAM(S): at 19:26

## 2019-07-22 RX ADMIN — Medication 3 MILLILITER(S): at 10:37

## 2019-07-22 RX ADMIN — Medication 40 MILLIGRAM(S): at 17:45

## 2019-07-22 RX ADMIN — Medication 125 MILLIGRAM(S): at 10:27

## 2019-07-22 RX ADMIN — AZITHROMYCIN 255 MILLIGRAM(S): 500 TABLET, FILM COATED ORAL at 13:07

## 2019-07-22 RX ADMIN — SODIUM CHLORIDE 500 MILLILITER(S): 9 INJECTION INTRAMUSCULAR; INTRAVENOUS; SUBCUTANEOUS at 12:07

## 2019-07-22 RX ADMIN — Medication 3 MILLILITER(S): at 23:24

## 2019-07-22 RX ADMIN — ENOXAPARIN SODIUM 40 MILLIGRAM(S): 100 INJECTION SUBCUTANEOUS at 20:29

## 2019-07-22 RX ADMIN — SODIUM CHLORIDE 50 MILLILITER(S): 9 INJECTION INTRAMUSCULAR; INTRAVENOUS; SUBCUTANEOUS at 17:44

## 2019-07-22 RX ADMIN — SODIUM CHLORIDE 1000 MILLILITER(S): 9 INJECTION INTRAMUSCULAR; INTRAVENOUS; SUBCUTANEOUS at 13:38

## 2019-07-22 RX ADMIN — Medication 3 MILLILITER(S): at 10:35

## 2019-07-22 RX ADMIN — Medication 6: at 17:44

## 2019-07-22 RX ADMIN — Medication 40 MILLIGRAM(S): at 21:52

## 2019-07-22 NOTE — ED ADULT NURSE NOTE - OBJECTIVE STATEMENT
Received the patient in the Er. Patient is alert and oriented. Skin warm and dry. C/O breathing difficulty.  Patient is breathing laboured. Hooked the patient to cardiac monitor. 12 lead EKG done and presented to JONAH Pastor.

## 2019-07-22 NOTE — ED PROVIDER NOTE - PROGRESS NOTE DETAILS
abg reviewed. pt put on bipap. will consult icu for admission pending call back icu consulted pending call back. hospitalist consulted pending call back addison icu pa advised repeat abg and re-eval

## 2019-07-22 NOTE — ED PROVIDER NOTE - CLINICAL SUMMARY MEDICAL DECISION MAKING FREE TEXT BOX
labs per sepsis protocol, abg , cxr to r/o pna, ekg, us bl le to r/o dvt, steroids, nebs, consult pulm.

## 2019-07-22 NOTE — ED PROVIDER NOTE - CRITICAL CARE PROVIDED
consultation with other physicians/direct patient care (not related to procedure)/interpretation of diagnostic studies/consult w/ pt's family directly relating to pts condition/additional history taking

## 2019-07-22 NOTE — CONSULT NOTE ADULT - SUBJECTIVE AND OBJECTIVE BOX
Patient was examined Chart reviewed Detailed note will be inserted below after going over latest data Meanwhile please refer to my previous notes for Pulmonary/Critical Care assessment recommendations [facility] TUR[facility] P [phone]  1940 DOA 2019   ALLERGY Shellfish  CONTACT Oumar EATON      Initial evaluation/Pulmonary Critical Care consultation requested on  2019  by Dr CARRILLO   from Dr Dejesus   Patient examined chart reviewed    HOSPITAL ADMISSION   PATIENT CAME  FROM (if information available)        TYPE OF VISIT      Initial evaluation/Pulmonary Critical Care consultation requested on  2019  by Dr CARRILLO   from Dr Dejesus     REASON FOR VISIT  PLEASE SEE PROBLEM LIST/ASSESSMENTAND RECOMMENDATIONS     PATIENT [facility]UR[facility] P [phone]  1940 DOA 2019                           PT DESCRIPTION       This section was excerpted from ER md note but was independently verified by me    · Chief Complaint: The patient is a 79y Male complaining of difficulty breathing.  · HPI Objective Statement: pt is a 80yo male with pmhx of copd on home O2 NC2L and pm bipap, CAD, HTN, S/P cardiac arrest with intubation in 2018, presents with sob x today. pt reports increased sob with wheezing and non-productive cough. pt took 10mg prednisone and neb with minimal relief. pt reports he was supposed to have an angiogram today but was unable to due to sob.     pmd: olga ashley: katie  · Presenting Symptoms: COUGH, SHORTNESS OF BREATH, WHEEZING  · Negative Findings: no body aches, no chest pain, no chills, no edema, no fever, no hemoptysis  · Timing: sudden onset  · Duration: today  · Quality: non-productive cough  · Severity: PAIN SCALE 0 OF 10.  · Context: unknown  · Recent Exposure To: none known  · Aggravated Factors: breathing deeply, coughing  · Relieving Factors: prescription medication    PAST MEDICAL/SURGICAL/FAMILY/SOCIAL HISTORY:    Past Medical History:  CAD (Coronary Artery Disease)    COPD (chronic obstructive pulmonary disease)    Diabetes Mellitus, Type II    Dyslipidemia    Hypertension    Osteomyelitis    PVD (peripheral vascular disease).     Past Surgical History:  CABG (Coronary Artery Bypass Graft)    Compound fracture  left leg.     Tobacco Usage:  · Tobacco Usage Former smoker    ALLERGIES AND HOME MEDICATIONS:   Allergies:        Allergies:  No Known Allergies:        Intolerances:  shellfish: Food, Nausea, feels nauseous when eating crabs or shrimp but no true allergic reaction    Home Medications:   * Patient Currently Takes Medications as of 10-Jul-2019 19:56 documented in Structured Notes  · predniSONE 20 mg oral tablet: 2 tab(s) orally once a day   · azithromycin 250 mg oral tablet: 1 tab(s) orally once a day   · predniSONE 20 mg oral tablet: 1 tab(s) orally once a day   · metoprolol tartrate 25 mg oral tablet: 1 tab(s) orally 2 times a day  · valsartan 80 mg oral tablet: 1 tab(s) orally once a day  · Multiple Vitamins oral tablet: 1 tab(s) orally once a day  · clopidogrel 75 mg oral tablet: 1 tab(s) orally once a day  · aspirin 81 mg oral delayed release tablet: 1 tab(s) orally once a day  · atorvastatin 40 mg oral tablet: 1 tab(s) orally once a day  · glipiZIDE 5 mg oral tablet: 0.5 tab(s) orally 2 times a day  · tamsulosin 0.4 mg oral capsule: 1 cap(s) orally once a day (at bedtime)  · Breo Ellipta 100 mcg-25 mcg/inh inhalation powder: 1 puff(s) inhaled once a day  · Ventolin HFA 90 mcg/inh inhalation aerosol: 2 puff(s) inhaled 4 times a day  · Incruse Ellipta 62.5 mcg/inh inhalation powder: 1 puff(s) inhaled once a day  · metFORMIN 1000 mg oral tablet: 1 tab(s) orally 2 times a day    REVIEW OF SYSTEMS:    Review of Systems:  · CONSTITUTIONAL: no fever and no chills.  · CARDIOVASCULAR: - - -  · Cardiovascular [-]: no chest pain, no palpitations, no peripheral edema  · RESPIRATORY: - - -  · Respiratory [+]: COUGH, SHORTNESS OF BREATH  · Respiratory [-]: no exertional dyspnea, no hemoptysis  · GASTROINTESTINAL: no abdominal pain, no bloating, no constipation, no diarrhea, no nausea and no vomiting.  · SKIN: no abrasions, no jaundice, no lesions, no pruritis, and no rashes.  · NEURO: no loss of consciousness, no gait abnormality, no headache, no sensory deficits, and no weakness.  · ROS STATEMENT: all other ROS negative except as per HPI              PATIENT [facility] TURNER [facility] P 868 018  1940 DOA 2019     VITALS/LABS       2019 99 130/60   2019 W 16 hb 13.3 Plt 235 INR .9 Na 141 K 5 CO2 28 Cr 1.2   2019 721/74/112 3l   2019 cxr hyperinflated lungs       REVIEW OF SYMPTOMS     NOTE Changess if any  in ROS and PE are updated in daily HOSPITAL COURSE below      Able to give ROS  Yes     RELIABLE No   CONSTITUTIONAL Weakness Yes  Chills No Vision changes No  ENDOCRINE No unexplained hair loss No heat or cold intolerance    ALLERGY No hives  Sore throat No   RESP Coughing blood no  Shortness of breath YES   NEURO No Headache  Confusion Pain neck No   CARDIAC No Chest pain No Palpitations   GI No Pain abdomen NO   Vomiting NO     PHYSICAL EXAM    HEENT Unremarkable PERRLA atraumatic   RESP Fair air entry EXP prolonged    Harsh breath sound Resp distres mild   CARDIAC S1 S2 No S3     NO JVD    ABDOMEN SOFT BS PRESENT NOT DISTENDED No hepatosplenomegaly PEDAL EDEMA present No calf tenderness  NO rash   GENERAL Not TOXIC

## 2019-07-22 NOTE — H&P ADULT - NSHPPHYSICALEXAM_GEN_ALL_CORE
Vital Signs Last 24 Hrs  T(C): 36.8 (22 Jul 2019 09:46), Max: 36.8 (22 Jul 2019 09:46)  T(F): 98.2 (22 Jul 2019 09:46), Max: 98.2 (22 Jul 2019 09:46)  HR: 97 (22 Jul 2019 12:58) (97 - 110)  BP: 135/64 (22 Jul 2019 10:49) (135/64 - 149/79)  BP(mean): --  RR: 18 (22 Jul 2019 10:49) (18 - 18)  SpO2: 100% (22 Jul 2019 12:58) (97% - 100%)  CAPILLARY BLOOD GLUCOSE        PHYSICAL EXAM:  GENERAL: lying in bed, NAD; no BiPAP machine  HEAD:  atraumatic, normocephalic  EYES: sclera anicteric  ENMT: mucous membranes dry  NECK: supple  HEART: normal S1, S2  CHEST/LUNG: respirations mildly labored; air entry symmetric; BS coarse bilaterally; + wheezing b/l  ABDOMEN: BS+, soft, ND, NT   EXTREMITIES: trace edema b/l LEs  NEURO: awake, alert, interactive; moves all extremities Vital Signs Last 24 Hrs  T(C): 36.8 (22 Jul 2019 09:46), Max: 36.8 (22 Jul 2019 09:46)  T(F): 98.2 (22 Jul 2019 09:46), Max: 98.2 (22 Jul 2019 09:46)  HR: 97 (22 Jul 2019 12:58) (97 - 110)  BP: 135/64 (22 Jul 2019 10:49) (135/64 - 149/79)  RR: 18 (22 Jul 2019 10:49) (18 - 18)  SpO2: 100% (22 Jul 2019 12:58) (97% - 100%)    PHYSICAL EXAM:  GENERAL: elderly male, chronically ill appearing; lying in bed, NAD; on BiPAP machine  HEAD:  atraumatic, normocephalic  EYES: sclera anicteric  ENMT: mucous membranes dry  NECK: supple  HEART: normal S1, S2  CHEST/LUNG: respirations mildly labored; air entry symmetric; BS coarse bilaterally; + wheezing b/l  ABDOMEN: BS+, soft, ND, NT   EXTREMITIES: trace edema b/l LEs  NEURO: awake, alert, interactive; moves all extremities

## 2019-07-22 NOTE — H&P ADULT - NSHPREVIEWOFSYSTEMS_GEN_ALL_CORE
REVIEW OF SYSTEMS:    CONSTITUTIONAL: No fever, chills, weight loss, or fatigue  EYES: No eye pain, visual disturbances, or discharge  ENMT:  No difficulty hearing, tinnitus, vertigo; No sinus or throat pain  NECK: No pain or stiffness  RESPIRATORY: No cough, or hemoptysis  CARDIOVASCULAR: No chest pain, palpitations, dizziness  GASTROINTESTINAL: No abdominal or epigastric pain. No nausea, vomiting, or hematemesis; No diarrhea or constipation. No melena or hematochezia.  GENITOURINARY: No dysuria, frequency, hematuria, or incontinence  NEUROLOGICAL: No headaches, memory loss, loss of strength, numbness, or tremors  SKIN: No itching, burning, rashes, or lesions   LYMPH NODES: No enlarged glands  ENDOCRINE: No heat or cold intolerance; No hair loss  MUSCULOSKELETAL: No joint pain or swelling; No muscle, back, or extremity pain  PSYCHIATRIC: No depression, anxiety, mood swings, or difficulty sleeping  HEME/LYMPH: No easy bruising, or bleeding gums  ALLERGY AND IMMUNOLOGIC: No hives or eczema

## 2019-07-22 NOTE — ED PROVIDER NOTE - OBJECTIVE STATEMENT
pt is a 78yo male with pmhx of copd on home O2 NC2L and pm bipap, CAD, HTN, S/P cardiac arrest with intubation in 2018, presents with sob x today. pt reports increased sob with wheezing and non-productive cough. pt took 10mg prednisone and neb with minimal relief. pt reports he was supposed to have an angiogram today but was unable to due to sob.     pmd: olga ashley: katie

## 2019-07-22 NOTE — CONSULT NOTE ADULT - ATTENDING COMMENTS
Pt seen and examined with ICU PAs. Pt is 79M h/o HTN, COPD (On home 2L O2 2L and BIPAP when sleeping/napping), CAD w/ 3 vessel CABG, HLD, DM2, previous cardiac arrest 2/2 hypercapnic resp failure (6/2018) p/w SOB and increased WOB in setting of COPD exacerbation, likely from being outside in high heat and humidity/poor air quality based on pt's HPI. In ED, received nebs, steroids and placed on BiPAP. Pt appeared comfortable, speaking full sentences on my exam. Improved from initial presentation as no longer with increased WOB, though still with diffuse wheezes.    Acute Problems:  1. hypercarbic resp failure requiring bipap  2. copd exacerbation  3. distal mucus plugging of RLL  4. hypergmlycemia    - c/w BiPAP at 12/6  - prednisone 40mg daily, azithro 500mg q24hr  - chest pt and incentive mateusz when off BiPAP  - no AG, acidosis 2/2 hypercarbia - ISS q6hr   - repeat ABG improving on BiPAP, pt currently stable for the floor with continuous O2 monitoring

## 2019-07-22 NOTE — H&P ADULT - PROBLEM SELECTOR PROBLEM 3
Acute respiratory failure with hypercapnia Type 2 diabetes mellitus with hyperglycemia, unspecified whether long term insulin use

## 2019-07-22 NOTE — CONSULT NOTE ADULT - SUBJECTIVE AND OBJECTIVE BOX
Patient is a 79y old  Male who presents with a chief complaint of SOB    HPI: 78 yo m pmh with PMHx significant for HTN, COPD (On home O2 2L and BIPAP at while sleeping regardless of time of day), CAD w/ 3v CABG, HLD, DM2 (on Metformin, glipizide), hx Hypercapnic Resp Failure/Cardiac Arrest requiring intubation (6/'18), Presents to the ED for increased SOB. Patient was found on BiPAP, mentating well, without increased WOB. Patient state he breathing became labored starting overnight. He attempted to use his inhalers which did not provide him with much relief. HE presented to the ER early this morning as his symptoms were not resolving. While in ER ABG showed pH of 7.21 and pCO2 of 74, he was placed on BIPAP, given albuterol and steroids. ICU called for consult. Patient seen and examined at the bedside he is on BiPAP, mentating well, taking great TV and minute ventilation on BIPAP, appears comfortable on BIPAP. Repeat ABG with pH 7.24 and PCO2 of 66. Patient denies CP, abdominal pain, diarrhea, fever, or chills. Spoke to wife at bedside who believes the warm weather and severe humidity stimulated a COPD exac. He is normally compliant with his COPD regimen.       PAST MEDICAL & SURGICAL HISTORY:  PVD (peripheral vascular disease)  Hypertension  COPD (chronic obstructive pulmonary disease)  Osteomyelitis  Dyslipidemia  CAD (Coronary Artery Disease)  Diabetes Mellitus, Type II  Compound fracture: left leg  CABG (Coronary Artery Bypass Graft)      Review of Systems:  CONSTITUTIONAL: No fever, chills, or fatigue  EYES: No eye pain, visual disturbances, or discharge  ENMT:  No difficulty hearing, tinnitus, vertigo; No sinus or throat pain  NECK: No pain or stiffness  RESPIRATORY: No cough, + wheezing, chills or hemoptysis; + shortness of breath  CARDIOVASCULAR: No chest pain, palpitations, dizziness, or leg swelling  GASTROINTESTINAL: No abdominal or epigastric pain. No nausea, vomiting, or hematemesis; No diarrhea or constipation. No melena or hematochezia.  GENITOURINARY: No dysuria, frequency, hematuria, or incontinence  NEUROLOGICAL: No headaches, memory loss, loss of strength, numbness, or tremors  SKIN: No itching, burning, rashes, or lesions   MUSCULOSKELETAL: No joint pain or swelling; No muscle, back, or extremity pain  PSYCHIATRIC: No depression, anxiety, mood swings, or difficulty sleeping      Medications:  azithromycin  IVPB      ALBUTerol/ipratropium for Nebulization 3 milliLiter(s) Nebulizer every 4 hours  buDESOnide    Inhalation Suspension 0.5 milliGRAM(s) Inhalation two times a day  enoxaparin Injectable 40 milliGRAM(s) SubCutaneous daily  methylPREDNISolone sodium succinate Injectable 40 milliGRAM(s) IV Push every 8 hours  sodium chloride 0.45%. 1000 milliLiter(s) IV Continuous <Continuous>      ICU Vital Signs  T(C): 36.8   T(F): 98.2   HR: 97  BP: 135/64  RR: 18  SpO2: 100%      ABG - ( 22 Jul 2019 12:26 )  pH, Arterial: 7.24  pH, Blood: x     /  pCO2: 66    /  pO2: 199   / HCO3: 24    / Base Excess: 0.7   /  SaO2: 100         I&O's Detail        LABS:                        13.3   16.06 )-----------( 235      ( 22 Jul 2019 10:30 )             43.0     07-22    141  |  108  |  18  ----------------------------<  222<H>  5.0   |  28  |  1.20    Ca    9.1      22 Jul 2019 10:30    TPro  6.8  /  Alb  3.8  /  TBili  0.3  /  DBili  x   /  AST  19  /  ALT  34  /  AlkPhos  87  07-22        CAPILLARY BLOOD GLUCOSE        PT/INR - ( 22 Jul 2019 10:30 )   PT: 10.2 sec;   INR: 0.90 ratio         PTT - ( 22 Jul 2019 10:30 )  PTT:32.4 sec    CULTURES:      Physical Examination:    General: No acute distress.  Alert, oriented, interactive, nonfocal    HEENT: Pupils equal, reactive to light.  Symmetric.    PULM: Poor air movement, b/l wheeze, no increased WOB    CVS: Regular rate and rhythm, no murmurs, rubs, or gallops    ABD: Soft, nondistended, nontender, normoactive bowel sounds, no masses    EXT: moderate edema b/l LE     SKIN: Warm and well perfused, no rashes noted.    NEURO: A&Ox3, strength 5/5 all extremities, cranial nerves grossly intact, no focal deficits      RADIOLOGY:   EXAM:  XR CHEST PORTABLE IMMED 1V                            PROCEDURE DATE:  07/22/2019          INTERPRETATION:  Sepsis.    AP chest. Prior 7/10/2019.    Left costophrenic angle excluded.    Status post median sternotomy. No change heart mediastinum. Hyperinflated   emphysematous lungs. No consolidation pneumothorax or effusion.    Impression: Hyperinflated lungs. No active infiltrates. Stable exam.        RODRIGO FAITH M.D., ATTENDING RADIOLOGIST  This document has been electronically signed. Jul 22 2019 10:56AM       TIME SPENT: 35 min   Evaluating/treating patient, reviewing data/labs/imaging, discussing case with multidisciplinary team, discussing plan/goals of care with patient/family. Non-inclusive of procedure time. Patient is a 79y old  Male who presents with a chief complaint of SOB    HPI: 78 yo m pmh with PMHx significant for HTN, COPD (On home O2 2L and BIPAP at while sleeping regardless of time of day), CAD w/ 3v CABG, HLD, DM2 (on Metformin, glipizide), hx Hypercapnic Resp Failure/Cardiac Arrest requiring intubation (6/'18), Presents to the ED for increased SOB. Patient was found on BiPAP, mentating well, without increased WOB. Patient state he breathing became labored starting overnight. He attempted to use his inhalers which did not provide him with much relief. HE presented to the ER early this morning as his symptoms were not resolving. While in ER ABG showed pH of 7.21 and pCO2 of 74, he was placed on BIPAP, given albuterol and steroids. ICU called for consult. Patient seen and examined at the bedside he is on BiPAP, mentating well, taking great TV and minute ventilation on BIPAP, appears comfortable on BIPAP. Repeat ABG with pH 7.24 and PCO2 of 66. Patient denies CP, abdominal pain, diarrhea, fever, or chills. Spoke to wife at bedside who believes the warm weather and severe humidity stimulated a COPD exac. He is normally compliant with his COPD regimen.       PAST MEDICAL & SURGICAL HISTORY:  PVD (peripheral vascular disease)  Hypertension  COPD (chronic obstructive pulmonary disease)  Osteomyelitis  Dyslipidemia  CAD (Coronary Artery Disease)  Diabetes Mellitus, Type II  Compound fracture: left leg  CABG (Coronary Artery Bypass Graft)    No known allergies    Former smoker, occasional alcohol use      Review of Systems:  CONSTITUTIONAL: No fever, chills, or fatigue  EYES: No eye pain, visual disturbances, or discharge  ENMT:  No difficulty hearing, tinnitus, vertigo; No sinus or throat pain  NECK: No pain or stiffness  RESPIRATORY: No cough, + wheezing, chills or hemoptysis; + shortness of breath  CARDIOVASCULAR: No chest pain, palpitations, dizziness, or leg swelling  GASTROINTESTINAL: No abdominal or epigastric pain. No nausea, vomiting, or hematemesis; No diarrhea or constipation. No melena or hematochezia.  GENITOURINARY: No dysuria, frequency, hematuria, or incontinence  NEUROLOGICAL: No headaches, memory loss, loss of strength, numbness, or tremors  SKIN: No itching, burning, rashes, or lesions   MUSCULOSKELETAL: No joint pain or swelling; No muscle, back, or extremity pain  PSYCHIATRIC: No depression, anxiety, mood swings, or difficulty sleeping      Medications:  azithromycin  IVPB      ALBUTerol/ipratropium for Nebulization 3 milliLiter(s) Nebulizer every 4 hours  buDESOnide    Inhalation Suspension 0.5 milliGRAM(s) Inhalation two times a day  enoxaparin Injectable 40 milliGRAM(s) SubCutaneous daily  methylPREDNISolone sodium succinate Injectable 40 milliGRAM(s) IV Push every 8 hours  sodium chloride 0.45%. 1000 milliLiter(s) IV Continuous <Continuous>      ICU Vital Signs  T(C): 36.8   T(F): 98.2   HR: 97  BP: 135/64  RR: 18  SpO2: 100%      ABG - ( 22 Jul 2019 12:26 )  pH, Arterial: 7.24  pH, Blood: x     /  pCO2: 66    /  pO2: 199   / HCO3: 24    / Base Excess: 0.7   /  SaO2: 100         I&O's Detail        LABS:                        13.3   16.06 )-----------( 235      ( 22 Jul 2019 10:30 )             43.0     07-22    141  |  108  |  18  ----------------------------<  222<H>  5.0   |  28  |  1.20    Ca    9.1      22 Jul 2019 10:30    TPro  6.8  /  Alb  3.8  /  TBili  0.3  /  DBili  x   /  AST  19  /  ALT  34  /  AlkPhos  87  07-22        CAPILLARY BLOOD GLUCOSE        PT/INR - ( 22 Jul 2019 10:30 )   PT: 10.2 sec;   INR: 0.90 ratio         PTT - ( 22 Jul 2019 10:30 )  PTT:32.4 sec    CULTURES:      Physical Examination:    General: No acute distress.  Alert, oriented, interactive, nonfocal    HEENT: Pupils equal, reactive to light.  Symmetric.    PULM: Poor air movement, b/l wheeze, no increased WOB    CVS: Regular rate and rhythm, no murmurs, rubs, or gallops    ABD: Soft, nondistended, nontender, normoactive bowel sounds, no masses    EXT: moderate edema b/l LE     SKIN: Warm and well perfused, no rashes noted.    NEURO: A&Ox3, strength 5/5 all extremities, cranial nerves grossly intact, no focal deficits      RADIOLOGY:   EXAM:  XR CHEST PORTABLE IMMED 1V                            PROCEDURE DATE:  07/22/2019          INTERPRETATION:  Sepsis.    AP chest. Prior 7/10/2019.    Left costophrenic angle excluded.    Status post median sternotomy. No change heart mediastinum. Hyperinflated   emphysematous lungs. No consolidation pneumothorax or effusion.    Impression: Hyperinflated lungs. No active infiltrates. Stable exam.        RODRIGO FAITH M.D., ATTENDING RADIOLOGIST  This document has been electronically signed. Jul 22 2019 10:56AM       TIME SPENT: 35 min   Evaluating/treating patient, reviewing data/labs/imaging, discussing case with multidisciplinary team, discussing plan/goals of care with patient/family. Non-inclusive of procedure time.

## 2019-07-22 NOTE — H&P ADULT - HISTORY OF PRESENT ILLNESS
79M, COPD/2L home O2/nightly BiPAP, hx cardiac arrest 2018, c/f cardiac dz/planned for LHC today at [facility]; recent ED visit 7/2019 for COPD exac, d/c'd on prednisone; adm w/sob x past 1d, worsening despite home regimen of nebs, steroids; denies cough, fevers, chills, recent sick contacts.    In ED, labs w/ABG 7.2/74; pt placed on BiPAP. 79M, HTN, COPD/2L home O2/nightly BiPAP, hx cardiac arrest 2018, c/f cardiac dz/planned for LHC today at [facility]; recent ED visit 7/2019 for COPD exac, d/c'd on prednisone; adm w/sob x past 1d, worsening despite home regimen of nebs, steroids; denies cough, fevers, chills, recent sick contacts.    In ED, labs w/ABG 7.2/74/112 ON 35%; pt placed on BiPAP; labs w/WBC 16.06, neg Ddimer, neg trop, lactate wnl; CXR neg for infiltrates; LE dopplers done and NEG; CTA chest done and NEG.

## 2019-07-22 NOTE — PATIENT PROFILE ADULT - NSPROGENOTHERPROVIDER_GEN_A_NUR
medical specialist/Dr Luis Angel Avila ([facility]) [phone] primary  Dr ITZ Dejesus pulmonary  Dr Pallavi Rod cardio [phone]

## 2019-07-22 NOTE — ED ADULT NURSE REASSESSMENT NOTE - NS ED NURSE REASSESS COMMENT FT1
Respiratory Therapy and ICU PA at bedside w/ patient. Pt on bipap at this time. Awake alert and oriented x 4. Tolerating bipap well. Family at bedside updated on POC for admission.

## 2019-07-22 NOTE — H&P ADULT - ASSESSMENT
79M, COPD/2L home O2/nightly BiPAP, hx cardiac arrest 2018, c/f cardiac dz/planned for [facility] today at [facility]; recent ED visit 7/2019 for COPD exac, d/c'd on prednisone; adm w/sob x past 1d, worsening despite home regimen of nebs, steroids; labs w/ABG 7.2/74; pt placed on BiPAP.    SOB, wheezing - in setting of COPD exac, hypercapneic resp failure  -continue BiPAP tx  -continue pulm regimen of nebs, IV steroids, suppl O2  -no cristobal e/o acute infection  -Pulm consulted -   -r/o VTE - pending CTA chest, LE dopplers    elev trop - in setting of elev c/f cardiac ischemic dz  -continue cardiac regimen   -TTE pending  -Cards consulted    dvt prophy 79M, HTN, COPD/2L home O2/nightly BiPAP, hx cardiac arrest 2018, c/f cardiac dz/planned for LHC today at [facility]; recent ED visit 7/2019 for COPD exac, d/c'd on prednisone; adm w/sob x past 1d, worsening despite home regimen of nebs, steroids; labs w/ABG 7.21/74/112, WBC 16.06, CXR neg, CTA chest neg for PE, LE dopplers neg - pt being managed for acute hypercapneic resp failure in setting of COPD exac - on BiPAP    SOB, wheezing - in setting of COPD exac, hypercapneic resp failure  -continue BiPAP tx  -continue pulm regimen of nebs, IV steroids, suppl O2  -no cristobal e/o acute infection, but w/leukocytosis - possibly in setting of recent steroids at home - CXR neg, CTA chest neg - cover w/azithromycin for possible acute bronchitis  -Pulm consulted -   -r/o'd VTE - CTA chest NEG, LE dopplers NEG    hx c/f ischemic cardiac dz - planned for cardiac cath today at [facility]   -continue cardiac regimen of asa, lipitor, metoprolol  -no e/o acute ischemic episode    HTN - continue home regimen    DM - glycemic cvrg w/insulin    dvt prophy 79M, HTN, COPD/2L home O2/nightly BiPAP, hx cardiac arrest 2018, c/f cardiac dz/planned for LHC today at [facility]; recent ED visit 7/2019 for COPD exac, d/c'd on prednisone; adm w/sob x past 1d, worsening despite home regimen of nebs, steroids; labs w/ABG 7.21/74/112, WBC 16.06, CXR neg, CTA chest neg for PE, LE dopplers neg - pt being managed for acute hypercapneic resp failure in setting of COPD exac - on BiPAP    SOB, wheezing - in setting of COPD exac, hypercapneic resp failure  -continue BiPAP tx  -continue pulm regimen of nebs, IV steroids, suppl O2  -no cristobal e/o acute infection, but w/leukocytosis - possibly in setting of recent steroids at home - CXR neg, CTA chest neg - cover w/azithromycin for possible acute bronchitis  -Pulm consulted - Oleg following  -r/o'd VTE - CTA chest NEG, LE dopplers NEG    hx c/f ischemic cardiac dz - planned for o/p cardiac cath today at [facility] - postponed 2/2 current admission  -continue cardiac regimen of asa, lipitor, metoprolol  -no e/o acute ischemic episode    HTN - continue home regimen    DM - glycemic cvrg w/insulin    dvt prophy

## 2019-07-22 NOTE — CONSULT NOTE ADULT - ASSESSMENT
80 yo m pmh with PMHx significant for HTN, COPD (On home O2 2L and BIPAP at while sleeping regardless of time of day), CAD w/ 3v CABG, HLD, DM2 (on Metformin, glipizide), hx Hypercapnic Resp Failure/Cardiac Arrest requiring intubation (6/'18), Presents to the ED for increased SOB. At this time patient does not require admission to the ICU as he mentation is fine, he is without increased WOB, and he is improving on BIPAP. Please reconsult if patient's status changes.     Plan discussed with ICU attending Dr. Babb    Problem List:   1) acute COPD exacerbation  2) acute on chronic hypercapnic respiratory failure    - Keep patient on BiPAP for now as he still needs to blow of CO2  - Would obtain another ABG in 4-6 hours, do not remove BiPAP until repeat ABG is done  - Would give patient q4 hour albuterol treatments, solumedrol 40mg q6-8 hours   - Does azithromycin for COPD exac  - Pulm hygiene   - Most likely not PNA  - Attempt to obtain sputum culture and RVP   - Continue his home medications
PATIENT COMMBRIANNE TURNER [facility] P [phone]  1940 DOA 2019                            Patient description                          79 m PMH HTN, DM2 (on home Metformin and glipizide), COPD (2L home/ BIPAP at night), CAD with 3v CABG , HLD, 10/26/2018 ECHO ef 55% hx hypercapnic resp failure with ho intubation c/b with cardiac arrest (2018) with ho recurrent admissions GOLD D admitted [facility] P 2019 p with progressive resp distress  seesawing in er placed on BPAP No fever no chest pain    Home meds included  metorpolol 25.2 valsartan 80 plavx 75 asa breo 100 ventiolin incruse metformin 1000.2          Previous visits   -2019 [facility]   -2019 [facility]   -2019 [facility]   -2019  [facility]  3/11-3/14/2019 [facility]   -2019 [facility]   2/ [facility]   1-2019 [facility]                                                PATIENT  COMMODORE TURNER [facility] P [phone]  1940 DOA 2019                            PROBLEM/ASSESSMENT/RECOMMENDATIONS (A/R)       AOC HYPERCAPNIC RESP FAILURE   2019 721/74/112 3l   A/R BPAP 16/6/.3 2019 Repeat abg 2p If worse may need icu or intubatioon or both  COPD ex   COPD (2L home/ BIPAP at night)  A/R COPD GOLD D with ac exacerbation   BD steroids   RO VTE   A/R 2019 check d dimer and v duplex  RO MI  CAD with 3v CABG 2004   10/26/2018 ECHO ef 55%   A/R 2019 Check c enz       TIME SPENT Over 55 minutes aggregate care time spent on encounter; activities included   direct pt care, counseling and/or coordinating care reviewing notes, lab data/ imaging , discussion with multidisciplinary team/ pt /family. Risks, benefits, alternatives  discussed in detail

## 2019-07-22 NOTE — ED PROVIDER NOTE - ATTENDING CONTRIBUTION TO CARE
I have personally performed a face to face diagnostic evaluation on this patient.  I have reviewed the PA note and agree with the history, exam, and plan of care, except as noted.  History and Exam by me shows  sob got worse yesterday.  pt was seen by me about 2 weeks ago for same complaints.   lungs- decreased bs diffusely.  labs sig wbc 16 and icu consults

## 2019-07-23 DIAGNOSIS — I25.10 ATHEROSCLEROTIC HEART DISEASE OF NATIVE CORONARY ARTERY WITHOUT ANGINA PECTORIS: ICD-10-CM

## 2019-07-23 DIAGNOSIS — I10 ESSENTIAL (PRIMARY) HYPERTENSION: ICD-10-CM

## 2019-07-23 DIAGNOSIS — Z29.9 ENCOUNTER FOR PROPHYLACTIC MEASURES, UNSPECIFIED: ICD-10-CM

## 2019-07-23 LAB
ALBUMIN SERPL ELPH-MCNC: 3 G/DL — LOW (ref 3.3–5)
ALP SERPL-CCNC: 65 U/L — SIGNIFICANT CHANGE UP (ref 40–120)
ALT FLD-CCNC: 28 U/L — SIGNIFICANT CHANGE UP (ref 12–78)
ANION GAP SERPL CALC-SCNC: 4 MMOL/L — LOW (ref 5–17)
APPEARANCE UR: CLEAR — SIGNIFICANT CHANGE UP
AST SERPL-CCNC: 10 U/L — LOW (ref 15–37)
BILIRUB SERPL-MCNC: 0.4 MG/DL — SIGNIFICANT CHANGE UP (ref 0.2–1.2)
BILIRUB UR-MCNC: NEGATIVE — SIGNIFICANT CHANGE UP
BUN SERPL-MCNC: 19 MG/DL — SIGNIFICANT CHANGE UP (ref 7–23)
CALCIUM SERPL-MCNC: 9.1 MG/DL — SIGNIFICANT CHANGE UP (ref 8.5–10.1)
CHLORIDE SERPL-SCNC: 105 MMOL/L — SIGNIFICANT CHANGE UP (ref 96–108)
CO2 SERPL-SCNC: 33 MMOL/L — HIGH (ref 22–31)
COLOR SPEC: YELLOW — SIGNIFICANT CHANGE UP
CREAT SERPL-MCNC: 1.2 MG/DL — SIGNIFICANT CHANGE UP (ref 0.5–1.3)
DIFF PNL FLD: NEGATIVE — SIGNIFICANT CHANGE UP
GLUCOSE SERPL-MCNC: 233 MG/DL — HIGH (ref 70–99)
GLUCOSE UR QL: 300 MG/DL
HCT VFR BLD CALC: 36.1 % — LOW (ref 39–50)
HGB BLD-MCNC: 11 G/DL — LOW (ref 13–17)
INR BLD: 1.08 RATIO — SIGNIFICANT CHANGE UP (ref 0.88–1.16)
KETONES UR-MCNC: NEGATIVE — SIGNIFICANT CHANGE UP
LEUKOCYTE ESTERASE UR-ACNC: NEGATIVE — SIGNIFICANT CHANGE UP
MCHC RBC-ENTMCNC: 27.5 PG — SIGNIFICANT CHANGE UP (ref 27–34)
MCHC RBC-ENTMCNC: 30.5 GM/DL — LOW (ref 32–36)
MCV RBC AUTO: 90.3 FL — SIGNIFICANT CHANGE UP (ref 80–100)
NITRITE UR-MCNC: NEGATIVE — SIGNIFICANT CHANGE UP
NRBC # BLD: 0 /100 WBCS — SIGNIFICANT CHANGE UP (ref 0–0)
PH UR: 5 — SIGNIFICANT CHANGE UP (ref 5–8)
PHOSPHATE SERPL-MCNC: 3.3 MG/DL — SIGNIFICANT CHANGE UP (ref 2.5–4.5)
PLATELET # BLD AUTO: 192 K/UL — SIGNIFICANT CHANGE UP (ref 150–400)
POTASSIUM SERPL-MCNC: 5.1 MMOL/L — SIGNIFICANT CHANGE UP (ref 3.5–5.3)
POTASSIUM SERPL-SCNC: 5.1 MMOL/L — SIGNIFICANT CHANGE UP (ref 3.5–5.3)
PROT SERPL-MCNC: 5.7 G/DL — LOW (ref 6–8.3)
PROT UR-MCNC: NEGATIVE — SIGNIFICANT CHANGE UP
PROTHROM AB SERPL-ACNC: 12.3 SEC — SIGNIFICANT CHANGE UP (ref 10–12.9)
RBC # BLD: 4 M/UL — LOW (ref 4.2–5.8)
RBC # FLD: 13.6 % — SIGNIFICANT CHANGE UP (ref 10.3–14.5)
SODIUM SERPL-SCNC: 142 MMOL/L — SIGNIFICANT CHANGE UP (ref 135–145)
SP GR SPEC: 1.01 — SIGNIFICANT CHANGE UP (ref 1.01–1.02)
TROPONIN I SERPL-MCNC: <.015 NG/ML — SIGNIFICANT CHANGE UP (ref 0.01–0.04)
UROBILINOGEN FLD QL: NEGATIVE — SIGNIFICANT CHANGE UP
WBC # BLD: 11.54 K/UL — HIGH (ref 3.8–10.5)
WBC # FLD AUTO: 11.54 K/UL — HIGH (ref 3.8–10.5)

## 2019-07-23 PROCEDURE — 99233 SBSQ HOSP IP/OBS HIGH 50: CPT | Mod: GC

## 2019-07-23 RX ORDER — INSULIN LISPRO 100/ML
VIAL (ML) SUBCUTANEOUS AT BEDTIME
Refills: 0 | Status: DISCONTINUED | OUTPATIENT
Start: 2019-07-23 | End: 2019-07-23

## 2019-07-23 RX ORDER — INSULIN LISPRO 100/ML
VIAL (ML) SUBCUTANEOUS AT BEDTIME
Refills: 0 | Status: DISCONTINUED | OUTPATIENT
Start: 2019-07-23 | End: 2019-07-24

## 2019-07-23 RX ADMIN — Medication 81 MILLIGRAM(S): at 11:52

## 2019-07-23 RX ADMIN — Medication 3 MILLILITER(S): at 16:03

## 2019-07-23 RX ADMIN — SODIUM CHLORIDE 50 MILLILITER(S): 9 INJECTION INTRAMUSCULAR; INTRAVENOUS; SUBCUTANEOUS at 11:58

## 2019-07-23 RX ADMIN — Medication 2: at 21:28

## 2019-07-23 RX ADMIN — Medication 3 MILLILITER(S): at 23:03

## 2019-07-23 RX ADMIN — AZITHROMYCIN 255 MILLIGRAM(S): 500 TABLET, FILM COATED ORAL at 11:59

## 2019-07-23 RX ADMIN — Medication 40 MILLIGRAM(S): at 13:16

## 2019-07-23 RX ADMIN — Medication 25 MILLIGRAM(S): at 17:05

## 2019-07-23 RX ADMIN — Medication 3 MILLILITER(S): at 13:39

## 2019-07-23 RX ADMIN — Medication 40 MILLIGRAM(S): at 05:38

## 2019-07-23 RX ADMIN — Medication 3 MILLILITER(S): at 02:13

## 2019-07-23 RX ADMIN — Medication 4: at 05:37

## 2019-07-23 RX ADMIN — Medication 6: at 12:18

## 2019-07-23 RX ADMIN — ENOXAPARIN SODIUM 40 MILLIGRAM(S): 100 INJECTION SUBCUTANEOUS at 11:53

## 2019-07-23 RX ADMIN — Medication 6: at 17:05

## 2019-07-23 RX ADMIN — Medication 0.5 MILLIGRAM(S): at 07:48

## 2019-07-23 RX ADMIN — CLOPIDOGREL BISULFATE 75 MILLIGRAM(S): 75 TABLET, FILM COATED ORAL at 11:53

## 2019-07-23 RX ADMIN — Medication 3 MILLILITER(S): at 20:35

## 2019-07-23 RX ADMIN — Medication 1 TABLET(S): at 11:53

## 2019-07-23 RX ADMIN — TAMSULOSIN HYDROCHLORIDE 0.4 MILLIGRAM(S): 0.4 CAPSULE ORAL at 21:10

## 2019-07-23 RX ADMIN — ATORVASTATIN CALCIUM 40 MILLIGRAM(S): 80 TABLET, FILM COATED ORAL at 21:10

## 2019-07-23 RX ADMIN — Medication 0.5 MILLIGRAM(S): at 20:35

## 2019-07-23 RX ADMIN — Medication 3 MILLILITER(S): at 07:47

## 2019-07-23 RX ADMIN — Medication 4: at 00:42

## 2019-07-23 NOTE — PROGRESS NOTE ADULT - SUBJECTIVE AND OBJECTIVE BOX
Patient was examined Chart reviewed Detailed note will be inserted below after going over latest data Meanwhile please refer to my previous notes for Pulmonary/Critical Care assessment recommendations COMMODORE TURNER [facility] P [phone]  1940 DOA 2019   ALLERGY [place]  CONTACT Sp [facility]      Initial evaluation/Pulmonary Critical Care consultation requested on  2019  by Dr CARRILLO   from Dr Dejesus   Patient examined chart reviewed    HOSPITAL ADMISSION   PATIENT CAME  FROM (if information available)        TYPE OF VISIT      Subsequent Pulmonary followup     REASON FOR VISIT  PLEASE SEE PROBLEM LIST/ASSESSMENTAND RECOMMENDATIONS     PATIENT COMMODORE TURNER [facility] P [phone]  1940 DOA 2019     VITALS/LABS       2019 afeb 98 120/60 100%   2019 2p bpap 16/6/.3   2019 W 11.5 Hb 11 Plt 192  Na 142 K 5.1 CO2 33 Cr 1.2     REVIEW OF SYMPTOMS     NOTE Changess if any  in ROS and PE are updated in daily HOSPITAL COURSE below      Able to give ROS  Yes     RELIABLE No   CONSTITUTIONAL Weakness Yes  Chills No Vision changes No  ENDOCRINE No unexplained hair loss No heat or cold intolerance    ALLERGY No hives  Sore throat No   RESP Coughing blood no  Shortness of breath YES   NEURO No Headache  Confusion Pain neck No   CARDIAC No Chest pain No Palpitations   GI No Pain abdomen NO   Vomiting NO     PHYSICAL EXAM    HEENT Unremarkable PERRLA atraumatic   RESP Fair air entry EXP prolonged    Harsh breath sound Resp distres mild   CARDIAC S1 S2 No S3     NO JVD    ABDOMEN SOFT BS PRESENT NOT DISTENDED No hepatosplenomegaly PEDAL EDEMA present No calf tenderness  NO rash   GENERAL Not TOXIC looking

## 2019-07-23 NOTE — PROGRESS NOTE ADULT - ATTENDING COMMENTS
Agree with plan and exam as above with following: Spoke to patient and wife, appears that he is compliant with cpap at home and meds, acute copd excacerbation. Will continue bipap more frequently now and plan for dc soon, patient improved quickly.

## 2019-07-23 NOTE — PROGRESS NOTE ADULT - PROBLEM SELECTOR PLAN 3
- Continue humalog sliding scale   - Monitor patient blood sugar levels Chronic, pt with pmhx c/f ischemic cardiac dz - planned for o/p cardiac cath today at [facility] - postponed 2/2 current admission  -Continue with ASA and Plavix

## 2019-07-23 NOTE — DIETITIAN INITIAL EVALUATION ADULT. - REASON INDICATOR FOR ASSESSMENT
Pressure Injury >stage 2. However pt does not have any pressure ulcers noted in chart, pt noted with suspected DTI of bridge of nose.

## 2019-07-23 NOTE — PROGRESS NOTE ADULT - ASSESSMENT
79M, HTN, COPD/2L home O2/nightly BiPAP, hx cardiac arrest 2018, c/f cardiac dz/planned for LHC today at [facility]; recent ED visit 7/2019 for COPD exac, d/c'd on prednisone; adm w/sob x past 1d, worsening despite home regimen of nebs, steroids; labs w/ABG 7.21/74/112, WBC 16.06, CXR neg, CTA chest neg for PE, LE dopplers neg - pt being managed for acute hypercapneic respiratory failure in setting of COPD exacerbation - on BiPAP. Patient's condition is improving as patient is less respiratory acidotic and PCO2 is downtrending. 79M, HTN, COPD/2L home O2/nightly BiPAP, hx cardiac arrest 2018, c/f cardiac dz/planned for LHC today at [facility]; recent ED visit 7/2019 for COPD exac, d/c'd on prednisone; adm w/sob x past 1d, worsening despite home regimen of nebs, steroids; labs w/ABG 7.21/74/112, WBC 16.06, CXR neg, CTA chest neg for PE, LE dopplers neg - pt being managed for acute hypercapneic respiratory failure in setting of COPD exacerbation - on BiPAP.

## 2019-07-23 NOTE — PROGRESS NOTE ADULT - PROBLEM SELECTOR PROBLEM 3
Type 2 diabetes mellitus with hyperglycemia, unspecified whether long term insulin use CAD (Coronary Artery Disease)

## 2019-07-23 NOTE — PROGRESS NOTE ADULT - PROBLEM SELECTOR PLAN 5
hx c/f ischemic cardiac dz - planned for o/p cardiac cath today at [facility] - postponed 2/2 current admission  -continue cardiac regimen of asa, lipitor, metoprolol  -no e/o acute ischemic episode Chronic, stable  -Continue home metoprolol 25mg PO BID, ASA 81mg PO qd, atorvastatin 40mg PO qhs

## 2019-07-23 NOTE — PROGRESS NOTE ADULT - PROBLEM SELECTOR PLAN 1
* in setting of COPD exacerbation  - Continue BiPAP treatment  - Conitinue pulm regimen of nebs, IV steroids, supplement O2  --no cristobal e/o acute infection, but w/leukocytosis - possibly in setting of recent steroids at home - CXR neg, CTA chest neg - cover w/azithromycin for possible acute bronchitis; Transition IV azithromycin to PO azithromycin  - Apprec Pulm recs; Dr. Dejesus 2/2 acute COPD exacerbation  -Encourage BIPAP use   -Continue pulm regimen of nebs, IV steroids, supplement O2  -Pt w/ leukocytosis, possibly 2/2 recent steroid use at home, not likely infectious etiology given afebrile, CXR neg, CTA chest neg. Will continue to cover w/ Azithromycin for possible acute bronchitis  -Transition IV azithromycin to PO azithromycin 500mg PO qd (day 2)  -Pulm following (Dr. Dejesus), recs appreciated

## 2019-07-23 NOTE — PROGRESS NOTE ADULT - PROBLEM SELECTOR PLAN 2
- Continue BiPAP treatment and pulm regimen (nebs, IV steroids, supplement O2) Acute on chronic, likely 2/2 noncompliance with home regimen    -Improving, pt currently asymptomatic   -Continue BIPAP and pulm regimen (nebs, IV steroids, supplement O2)  -D/c planning for tomorrow

## 2019-07-23 NOTE — PROGRESS NOTE ADULT - ASSESSMENT
PATIENT COMMOD[employer]URNER [facility] P [phone]  1940 DOA 2019                            Patient description                          79 m former smoker PMH HTN, DM2 (on home Metformin and glipizide), COPD (2L home/ BIPAP at night), CAD with 3v CABG , HLD, 10/26/2018 ECHO ef 55% hx hypercapnic resp failure with ho intubation c/b with cardiac arrest (2018) with ho recurrent admissions GOLD D admitted [facility] P 2019 p with progressive resp distress  seesawing in er placed on BPAP No fever no chest pain    Hospital course                                  AC HYPERCAPNIC RESP FAILURE Responded as during previous visits                    PATIENT  COMMODORE TURNER [facility] P [phone]  1940 DOA 2019                            PROBLEM/ASSESSMENT/RECOMMENDATIONS (A/R)       AOC HYPERCAPNIC RESP FAILURE    11p bpap16/6/.3 734/51/133   2019 721/74/112 3l   A/R Gas exchange improved with bpap following his usual post-hospitalization course seen previously   2019 Suggest repeat abg am and consider dc in 24-48 h     COPD ex  COPD (2L home/ BIPAP at night) Has COPD GOLD D with ac exacerbation   A/R On BD azithro steroids  Taper steroids Cont Rx   RESP TRACT INFECTION  pc n A/R Bacterial infection unlikely  RO VTE  ddimer n  v duplex n A/R VTE ruled out   A/R 2019 check d dimer and v duplex  RO MI PAST H CAD with 3v CABG 2004  10/26/2018 ECHO ef 55% - Tr 1 n.3   A/R MI ruled out       TIME SPENT Over 25 minutes aggregate care time spent on encounter; activities included   direct pt care, counseling and/or coordinating care reviewing notes, lab data/ imaging , discussion with multidisciplinary team/ pt /family. Risks, benefits, alternatives  discussed in detail.

## 2019-07-23 NOTE — PROGRESS NOTE ADULT - PROBLEM SELECTOR PLAN 4
-chronic, stable  - continue home regimen Chronic   -Continue humalog sliding scale   -Monitor glucose fingersticks

## 2019-07-23 NOTE — PROGRESS NOTE ADULT - SUBJECTIVE AND OBJECTIVE BOX
Patient is a 79y old  Male who presents with a chief complaint of SOB (2019 10:06)      INTERVAL HPI/ OVERNIGHT EVENTS:    T(C): 36.6 (19 @ 08:40), Max: 36.6 (19 @ 08:40)  HR: 86 (19 @ 09:17) (60 - 102)  BP: 125/79 (19 @ 08:40) (100/59 - 135/64)  RR: 22 (19 @ 08:40) (17 - 22)  SpO2: 94% (19 @ 09:17) (94% - 100%)  Wt(kg): --    I&O's Summary    2019 07:01  -  2019 07:00  --------------------------------------------------------  IN: 900 mL / OUT: 700 mL / NET: 200 mL        REVIEW OF SYSTEMS:  CONSTITUTIONAL: No fever, weight loss, or fatigue  EYES: No eye pain, visual disturbances, or discharge  ENMT:  No difficulty hearing, tinnitus, vertigo; No sinus or throat pain  NECK: No pain or stiffness  BREASTS: No pain, masses, or nipple discharge  RESPIRATORY: No cough, wheezing, chills or hemoptysis; No shortness of breath  CARDIOVASCULAR: No chest pain, palpitations, dizziness, or leg swelling  GASTROINTESTINAL: No abdominal or epigastric pain. No nausea, vomiting, or hematemesis; No diarrhea or constipation. No melena or hematochezia.  GENITOURINARY: No dysuria, frequency, hematuria, or incontinence  NEUROLOGICAL: No headaches, memory loss, loss of strength, numbness, or tremors  SKIN: No itching, burning, rashes, or lesions   LYMPH NODES: No enlarged glands  ENDOCRINE: No heat or cold intolerance; No hair loss  MUSCULOSKELETAL: No joint pain or swelling; No muscle, back, or extremity pain  PSYCHIATRIC: No depression, anxiety, mood swings, or difficulty sleeping  HEME/LYMPH: No easy bruising, or bleeding gums    PHYSICAL EXAM:  GENERAL: NAD, well-groomed, well-developed  HEAD:  Atraumatic, Normocephalic  EYES: EOMI, PERRLA, conjunctiva and sclera clear  ENMT: No tonsillar erythema, exudates, or enlargement; Moist mucous membranes, Good dentition, No lesions  NECK: Supple, No JVD, Normal thyroid  NERVOUS SYSTEM:  Alert & Oriented X3, Good concentration; Motor Strength 5/5 B/L upper and lower extremities; DTRs 2+ intact and symmetric  CHEST/LUNG: Clear to percussion bilaterally; No rales, rhonchi, wheezing, or rubs  HEART: Regular rate and rhythm; No murmurs, rubs, or gallops  ABDOMEN: Soft, Nontender, Nondistended; Bowel sounds present  EXTREMITIES:  2+ Peripheral Pulses, No clubbing, cyanosis, or edema  LYMPH: No lymphadenopathy noted  SKIN: No rashes or lesions    LABS:                        11.0   11.54 )-----------( 192      ( 2019 05:18 )             36.1     07-23    142  |  105  |  19  ----------------------------<  233<H>  5.1   |  33<H>  |  1.20    Ca    9.1      2019 05:18  Phos  3.3     07-23  Mg     1.8     07-    TPro  5.7<L>  /  Alb  3.0<L>  /  TBili  0.4  /  DBili  x   /  AST  10<L>  /  ALT  28  /  AlkPhos  65  07-23    PT/INR - ( 2019 05:18 )   PT: 12.3 sec;   INR: 1.08 ratio         PTT - ( 2019 10:30 )  PTT:32.4 sec  Urinalysis Basic - ( 2019 09:14 )    Color: Yellow / Appearance: Clear / S.015 / pH: x  Gluc: x / Ketone: Negative  / Bili: Negative / Urobili: Negative   Blood: x / Protein: Negative / Nitrite: Negative   Leuk Esterase: Negative / RBC: x / WBC x   Sq Epi: x / Non Sq Epi: x / Bacteria: x      CAPILLARY BLOOD GLUCOSE      POCT Blood Glucose.: 227 mg/dL (2019 05:37)  POCT Blood Glucose.: 244 mg/dL (2019 00:06)  POCT Blood Glucose.: 291 mg/dL (2019 17:04)    ABG - ( 2019 23:36 )  pH, Arterial: 7.34  pH, Blood: x     /  pCO2: 51    /  pO2: 133   / HCO3: 25    / Base Excess: 1.5   /  SaO2: 99        MEDICATIONS  (STANDING):  ALBUTerol/ipratropium for Nebulization 3 milliLiter(s) Nebulizer every 4 hours  aspirin enteric coated 81 milliGRAM(s) Oral daily  atorvastatin 40 milliGRAM(s) Oral at bedtime  azithromycin  IVPB 500 milliGRAM(s) IV Intermittent every 24 hours  azithromycin  IVPB      buDESOnide    Inhalation Suspension 0.5 milliGRAM(s) Inhalation two times a day  clopidogrel Tablet 75 milliGRAM(s) Oral daily  dextrose 5%. 1000 milliLiter(s) (50 mL/Hr) IV Continuous <Continuous>  dextrose 50% Injectable 12.5 Gram(s) IV Push once  dextrose 50% Injectable 25 Gram(s) IV Push once  dextrose 50% Injectable 25 Gram(s) IV Push once  enoxaparin Injectable 40 milliGRAM(s) SubCutaneous daily  insulin lispro (HumaLOG) corrective regimen sliding scale   SubCutaneous every 6 hours  insulin lispro (HumaLOG) corrective regimen sliding scale   SubCutaneous at bedtime  methylPREDNISolone sodium succinate Injectable 40 milliGRAM(s) IV Push every 8 hours  metoprolol tartrate 25 milliGRAM(s) Oral two times a day  multivitamin 1 Tablet(s) Oral daily  sodium chloride 0.9%. 1000 milliLiter(s) (50 mL/Hr) IV Continuous <Continuous>  tamsulosin 0.4 milliGRAM(s) Oral at bedtime    MEDICATIONS  (PRN):  dextrose 40% Gel 15 Gram(s) Oral once PRN Blood Glucose LESS THAN 70 milliGRAM(s)/deciliter  glucagon  Injectable 1 milliGRAM(s) IntraMuscular once PRN Glucose LESS THAN 70 milligrams/deciliter      Diet:    RADIOLOGY & ADDITIONAL TESTS:    Imaging Personally Reviewed:  [ ] YES  [ ] NO    Consultant(s) Notes Reviewed:  [ x ] YES  [ ] NO    Disposition:    DVT Prophylaxis:  Subcu Heparin [  ]     LMWH [ x ]     Coumadin [ ]    Xaeralto [ ]    Eliquis [ ]   Venodyne pumps [ ]    Discussed with Patient [ ]     Family [ ]          [ ]   RN[ ]      [ ]    Advance Directives: Patient is a 79y old  Male who presents with a chief complaint of SOB (2019 10:06)      INTERVAL HPI/ OVERNIGHT EVENTS:  Patient was seen at bedside in the morning. Patient appeared in no acute distress and comfortable. Patient reports sleeping well last night, and ate well. Patient reports no complaints. Patient denies headaches, lightheadedness, shortness of breath, coughs, chest pain, palpitations, abdominal pain, N/V/D/C, and difficulty with urination. There were no acute events overnight.       T(C): 36.6 (19 @ 08:40), Max: 36.6 (19 @ 08:40)  HR: 86 (19 @ 09:17) (60 - 102)  BP: 125/79 (19 @ 08:40) (100/59 - 135/64)  RR: 22 (19 @ 08:40) (17 - 22)  SpO2: 94% (19 @ 09:17) (94% - 100%)  Wt(kg): --    I&O's Summary    2019 07:01  -  2019 07:00  --------------------------------------------------------  IN: 900 mL / OUT: 700 mL / NET: 200 mL        REVIEW OF SYSTEMS:  CONSTITUTIONAL: No fever  EYES: No visual disturbances  RESPIRATORY: No cough, wheezing, or hemoptysis; No shortness of breath  CARDIOVASCULAR: No chest pain, palpitations, dizziness, or leg swelling  GASTROINTESTINAL: No abdominal pain. No nausea, vomiting, or hematemesis; No diarrhea or constipation. No melena or hematochezia.  GENITOURINARY: No dysuria, frequency, hematuria, or incontinence  NEUROLOGICAL: No headaches    PHYSICAL EXAM:  GENERAL: NAD, well-groomed, well-developed  EYES: EOMI, PERRLA  ENMT: No tonsillar erythema, exudates, or enlargement; Moist mucous membranes, Good dentition, No lesions  NECK: Supple, No JVD, Normal thyroid  NERVOUS SYSTEM:  Alert & Oriented X3, Good concentration; Motor Strength 5/5 B/L upper and lower extremities; DTRs 2+ intact and symmetric  CHEST/LUNG: Clear to percussion bilaterally; No rales, rhonchi, wheezing, or rubs  HEART: Regular rate and rhythm; No murmurs, rubs, or gallops  ABDOMEN: Soft, Nontender, Nondistended; Bowel sounds present  EXTREMITIES:  2+ Peripheral Pulses, No clubbing, cyanosis, or edema  LYMPH: No lymphadenopathy noted  SKIN: No rashes or lesions    LABS:                        11.0   11.54 )-----------( 192      ( 2019 05:18 )             36.1         142  |  105  |  19  ----------------------------<  233<H>  5.1   |  33<H>  |  1.20    Ca    9.1      2019 05:18  Phos  3.3       Mg     1.8         TPro  5.7<L>  /  Alb  3.0<L>  /  TBili  0.4  /  DBili  x   /  AST  10<L>  /  ALT  28  /  AlkPhos  65  0723    PT/INR - ( 2019 05:18 )   PT: 12.3 sec;   INR: 1.08 ratio         PTT - ( 2019 10:30 )  PTT:32.4 sec  Urinalysis Basic - ( 2019 09:14 )    Color: Yellow / Appearance: Clear / S.015 / pH: x  Gluc: x / Ketone: Negative  / Bili: Negative / Urobili: Negative   Blood: x / Protein: Negative / Nitrite: Negative   Leuk Esterase: Negative / RBC: x / WBC x   Sq Epi: x / Non Sq Epi: x / Bacteria: x      CAPILLARY BLOOD GLUCOSE      POCT Blood Glucose.: 227 mg/dL (2019 05:37)  POCT Blood Glucose.: 244 mg/dL (2019 00:06)  POCT Blood Glucose.: 291 mg/dL (2019 17:04)    ABG - ( 2019 23:36 )  pH, Arterial: 7.34  pH, Blood: x     /  pCO2: 51    /  pO2: 133   / HCO3: 25    / Base Excess: 1.5   /  SaO2: 99        MEDICATIONS  (STANDING):  ALBUTerol/ipratropium for Nebulization 3 milliLiter(s) Nebulizer every 4 hours  aspirin enteric coated 81 milliGRAM(s) Oral daily  atorvastatin 40 milliGRAM(s) Oral at bedtime  azithromycin  IVPB 500 milliGRAM(s) IV Intermittent every 24 hours  azithromycin  IVPB      buDESOnide    Inhalation Suspension 0.5 milliGRAM(s) Inhalation two times a day  clopidogrel Tablet 75 milliGRAM(s) Oral daily  dextrose 5%. 1000 milliLiter(s) (50 mL/Hr) IV Continuous <Continuous>  dextrose 50% Injectable 12.5 Gram(s) IV Push once  dextrose 50% Injectable 25 Gram(s) IV Push once  dextrose 50% Injectable 25 Gram(s) IV Push once  enoxaparin Injectable 40 milliGRAM(s) SubCutaneous daily  insulin lispro (HumaLOG) corrective regimen sliding scale   SubCutaneous every 6 hours  insulin lispro (HumaLOG) corrective regimen sliding scale   SubCutaneous at bedtime  methylPREDNISolone sodium succinate Injectable 40 milliGRAM(s) IV Push every 8 hours  metoprolol tartrate 25 milliGRAM(s) Oral two times a day  multivitamin 1 Tablet(s) Oral daily  sodium chloride 0.9%. 1000 milliLiter(s) (50 mL/Hr) IV Continuous <Continuous>  tamsulosin 0.4 milliGRAM(s) Oral at bedtime    MEDICATIONS  (PRN):  dextrose 40% Gel 15 Gram(s) Oral once PRN Blood Glucose LESS THAN 70 milliGRAM(s)/deciliter  glucagon  Injectable 1 milliGRAM(s) IntraMuscular once PRN Glucose LESS THAN 70 milligrams/deciliter      Diet:    RADIOLOGY & ADDITIONAL TESTS:    Imaging Personally Reviewed:  [ ] YES  [ ] NO    Consultant(s) Notes Reviewed:  [ x ] YES  [ ] NO    Disposition:    DVT Prophylaxis:  Subcu Heparin [  ]     LMWH [ x ]     Coumadin [ ]    Xaeralto [ ]    Eliquis [ ]   Venodyne pumps [ ]    Discussed with Patient [ ]     Family [ ]          [ ]   RN[ ]      [ ]    Advance Directives: Patient is a 79y old  Male who presents with a chief complaint of SOB (2019 10:06)      INTERVAL HPI/ OVERNIGHT EVENTS:  Patient was seen at bedside in the morning. Patient appeared in no acute distress and comfortable. Patient reports sleeping well last night, and ate well. Patient reports no complaints. Patient denies headaches, lightheadedness, shortness of breath, coughs, chest pain, palpitations, abdominal pain, N/V/D/C, and difficulty with urination. There were no acute events overnight.       T(C): 36.6 (19 @ 08:40), Max: 36.6 (19 @ 08:40)  HR: 86 (19 @ 09:17) (60 - 102)  BP: 125/79 (19 @ 08:40) (100/59 - 135/64)  RR: 22 (19 @ 08:40) (17 - 22)  SpO2: 94% (19 @ 09:17) (94% - 100%)  Wt(kg): --    I&O's Summary    2019 07:01  -  2019 07:00  --------------------------------------------------------  IN: 900 mL / OUT: 700 mL / NET: 200 mL        REVIEW OF SYSTEMS:  CONSTITUTIONAL: No fever  EYES: No visual disturbances  RESPIRATORY: No cough, wheezing, or hemoptysis; No shortness of breath  CARDIOVASCULAR: No chest pain, palpitations, dizziness, or leg swelling  GASTROINTESTINAL: No abdominal pain. No nausea, vomiting, or hematemesis; No diarrhea or constipation. No melena or hematochezia.  GENITOURINARY: No dysuria, frequency, hematuria, or incontinence  NEUROLOGICAL: No headaches    PHYSICAL EXAM:  GENERAL: NAD, well-groomed, well-developed  EYES: +EOMI, +PERRLA  NERVOUS SYSTEM:  Alert & Oriented X3, Good concentration; Motor Strength 5/5 B/L upper and lower extremities;  CHEST/LUNG: No rales, rhonchi, or rubs. Diffuse bilateral wheezing auscultated. Decreased breath sounds noted bilaterally.   HEART: Regular rate and rhythm; No murmurs, rubs, or gallops  ABDOMEN: Soft, Nontender, Nondistended; Bowel sounds present  EXTREMITIES:  2+ Peripheral Pulses, No clubbing, cyanosis, or edema    LABS:                        11.0   11.54 )-----------( 192      ( 2019 05:18 )             36.1     07-    142  |  105  |  19  ----------------------------<  233<H>  5.1   |  33<H>  |  1.20    Ca    9.1      2019 05:18  Phos  3.3       Mg     1.8         TPro  5.7<L>  /  Alb  3.0<L>  /  TBili  0.4  /  DBili  x   /  AST  10<L>  /  ALT  28  /  AlkPhos  65  07    PT/INR - ( 2019 05:18 )   PT: 12.3 sec;   INR: 1.08 ratio         PTT - ( 2019 10:30 )  PTT:32.4 sec  Urinalysis Basic - ( 2019 09:14 )    Color: Yellow / Appearance: Clear / S.015 / pH: x  Gluc: x / Ketone: Negative  / Bili: Negative / Urobili: Negative   Blood: x / Protein: Negative / Nitrite: Negative   Leuk Esterase: Negative / RBC: x / WBC x   Sq Epi: x / Non Sq Epi: x / Bacteria: x      CAPILLARY BLOOD GLUCOSE      POCT Blood Glucose.: 227 mg/dL (2019 05:37)  POCT Blood Glucose.: 244 mg/dL (2019 00:06)  POCT Blood Glucose.: 291 mg/dL (2019 17:04)    ABG - ( 2019 23:36 )  pH, Arterial: 7.34  pH, Blood: x     /  pCO2: 51    /  pO2: 133   / HCO3: 25    / Base Excess: 1.5   /  SaO2: 99        MEDICATIONS  (STANDING):  ALBUTerol/ipratropium for Nebulization 3 milliLiter(s) Nebulizer every 4 hours  aspirin enteric coated 81 milliGRAM(s) Oral daily  atorvastatin 40 milliGRAM(s) Oral at bedtime  azithromycin  IVPB 500 milliGRAM(s) IV Intermittent every 24 hours  azithromycin  IVPB      buDESOnide    Inhalation Suspension 0.5 milliGRAM(s) Inhalation two times a day  clopidogrel Tablet 75 milliGRAM(s) Oral daily  dextrose 5%. 1000 milliLiter(s) (50 mL/Hr) IV Continuous <Continuous>  dextrose 50% Injectable 12.5 Gram(s) IV Push once  dextrose 50% Injectable 25 Gram(s) IV Push once  dextrose 50% Injectable 25 Gram(s) IV Push once  enoxaparin Injectable 40 milliGRAM(s) SubCutaneous daily  insulin lispro (HumaLOG) corrective regimen sliding scale   SubCutaneous every 6 hours  insulin lispro (HumaLOG) corrective regimen sliding scale   SubCutaneous at bedtime  methylPREDNISolone sodium succinate Injectable 40 milliGRAM(s) IV Push every 8 hours  metoprolol tartrate 25 milliGRAM(s) Oral two times a day  multivitamin 1 Tablet(s) Oral daily  sodium chloride 0.9%. 1000 milliLiter(s) (50 mL/Hr) IV Continuous <Continuous>  tamsulosin 0.4 milliGRAM(s) Oral at bedtime    MEDICATIONS  (PRN):  dextrose 40% Gel 15 Gram(s) Oral once PRN Blood Glucose LESS THAN 70 milliGRAM(s)/deciliter  glucagon  Injectable 1 milliGRAM(s) IntraMuscular once PRN Glucose LESS THAN 70 milligrams/deciliter      Diet:    RADIOLOGY & ADDITIONAL TESTS:    < from: CT Angio Chest w/ IV Cont (19 @ 15:45) >    EXAM:  CT ANGIO CHEST (W)AW IC                            PROCEDURE DATE:  2019          INTERPRETATION:  CLINICAL INFORMATION: COPD, short of breath    COMPARISON: 2018    PROCEDURE:   CT Angiography of the Chest.  90 ml of Omnipaque 350 was injected intravenously. 10 ml were discarded.  Sagittal and coronal reformats were performed as well as 3D (MIP)   reconstructions.      FINDINGS:    LUNGS AND AIRWAYS: Patent central airways.  Centrilobular emphysematous   changes similar to prior. Bibasilar bronchiectasis and mucoid impacted   right lower lobe bronchus similar to prior. No focal consolidation.    PLEURA: No pleural effusion.    MEDIASTINUM AND FRANKLYN: No lymphadenopathy.    VESSELS: No pulmonary embolism. Nonaneurysmal thoracic aorta    HEART: Coronary artery calcification No pericardial effusion.    CHEST WALL AND LOWER NECK: Status post median sternotomy.    VISUALIZED UPPER ABDOMEN: Calcified gallstone    BONES: No acute.    IMPRESSION:     No evidence of pulmonary emboli.    Centrilobular emphysema with bilateral bronchiectatic changes and mucoid   impacted right lower lobe bronchus.                RODRIGO FAITH M.D., ATTENDING RADIOLOGIST  This document has been electronically signed. 2019  3:47PM                < end of copied text >    < from: US Duplex Venous Lower Ext Complete, Bilateral (19 @ 12:05) >    EXAM:  US DPLX LWR EXT VEINS COMPL BI                            PROCEDURE DATE:  2019          INTERPRETATION:  CLINICAL INFORMATION: Shortness of breath. No other   clinical information is provided.    COMPARISON: Prior duplex evaluation ofthe lower extremities dated   2019.    TECHNIQUE: Duplex sonography of the BILATERAL LOWER extremity veins with   color and spectral Doppler, with and without compression.      FINDINGS:    There is normal compressibility of the bilateral commonfemoral, femoral   and popliteal veins.     Doppler examination shows normal spontaneous and phasic flow.    No calf vein thrombosis is detected.    There is a small right-sided popliteal fossa cyst which measures 1.3 x   1.2 x 0.4 cm.    IMPRESSION:    No evidence of deep venous thrombosis in either lower extremity.                    LANE SALOMON M.D., ATTENDING RADIOLOGIST  This document has been electronically signed. 2019 12:08PM                < end of copied text >    < from: Xray Chest 1 View-PORTABLE IMMEDIATE (19 @ 10:50) >    EXAM:  XR CHEST PORTABLE IMMED 1V                            PROCEDURE DATE:  2019          INTERPRETATION:  Sepsis.    AP chest. Prior 7/10/2019.    Left costophrenic angle excluded.    Status post median sternotomy. No change heart mediastinum. Hyperinflated   emphysematous lungs. No consolidation pneumothorax or effusion.    Impression: Hyperinflated lungs. No active infiltrates. Stable exam.                RODRIGO FAITH M.D., ATTENDING RADIOLOGIST  This document has been electronically signed. 2019 10:56AM                < end of copied text >      Imaging Personally Reviewed:  [ ] YES  [ ] NO    Consultant(s) Notes Reviewed:  [ x ] YES  [ ] NO    Disposition:    DVT Prophylaxis:  Subcu Heparin [  ]     LMWH [ x ]     Coumadin [ ]    Xaeralto [ ]    Eliquis [ ]   Venodyne pumps [ ]    Discussed with Patient [ ]     Family [ ]          [ ]   RN[ ]      [ ]    Advance Directives: Patient is a 79y old  Male who presents with a chief complaint of SOB (2019 10:06)      INTERVAL HPI/ OVERNIGHT EVENTS:  Patient was seen and evaluated at bedside. No acute overnight events reported. Patient appears in no acute distress and comfortable. Patient reports sleeping well last night, and ate well. Patient denies headaches, lightheadedness, shortness of breath, coughs, chest pain, palpitations, abdominal pain, N/V/D/C, and difficulty with urination.       T(C): 36.6 (19 @ 08:40), Max: 36.6 (19 @ 08:40)  HR: 86 (19 @ 09:17) (60 - 102)  BP: 125/79 (19 @ 08:40) (100/59 - 135/64)  RR: 22 (19 @ 08:40) (17 - 22)  SpO2: 94% (19 @ 09:17) (94% - 100%)  Wt(kg): --    I&O's Summary    2019 07:01  -  2019 07:00  --------------------------------------------------------  IN: 900 mL / OUT: 700 mL / NET: 200 mL        REVIEW OF SYSTEMS:  CONSTITUTIONAL: No fever/chills, generalized weakness or fatigue   EYES: No visual disturbances including no blurry vision   RESPIRATORY: No cough, wheezing, or hemoptysis; No shortness of breath  CARDIOVASCULAR: No chest pain, palpitations, dizziness, or leg swelling  GASTROINTESTINAL: No abdominal pain. No N/V/D/C. No melena or hematochezia.  GENITOURINARY: No dysuria, frequency, hematuria, or incontinence  NEUROLOGICAL: No headaches    PHYSICAL EXAM:  GENERAL: NAD, well-groomed, well-developed  HEENT: NC/AT, EOMI b/l, +PERRLA, oropharynx clear without exudates or erythema   NERVOUS SYSTEM:  AA&O X3. Motor strength grossly intact   CHEST/LUNG: +Diffuse b/l wheezing, decr breath sounds b/l. No rales appreciated   HEART: +S1/S2, RRR. No murmurs or gallops appreciated   ABDOMEN: Soft, NTND. +Bowel sounds x4 quadrants   EXTREMITIES: 2+ peripheral pulses. No clubbing, cyanosis, or edema noted on examination     LABS:                        11.0   11.54 )-----------( 192      ( 2019 05:18 )             36.1     07-23    142  |  105  |  19  ----------------------------<  233<H>  5.1   |  33<H>  |  1.20    Ca    9.1      2019 05:18  Phos  3.3     -  Mg     1.8         TPro  5.7<L>  /  Alb  3.0<L>  /  TBili  0.4  /  DBili  x   /  AST  10<L>  /  ALT  28  /  AlkPhos  65  07-23    PT/INR - ( 2019 05:18 )   PT: 12.3 sec;   INR: 1.08 ratio         PTT - ( 2019 10:30 )  PTT:32.4 sec  Urinalysis Basic - ( 2019 09:14 )    Color: Yellow / Appearance: Clear / S.015 / pH: x  Gluc: x / Ketone: Negative  / Bili: Negative / Urobili: Negative   Blood: x / Protein: Negative / Nitrite: Negative   Leuk Esterase: Negative / RBC: x / WBC x   Sq Epi: x / Non Sq Epi: x / Bacteria: x      CAPILLARY BLOOD GLUCOSE      POCT Blood Glucose.: 227 mg/dL (2019 05:37)  POCT Blood Glucose.: 244 mg/dL (2019 00:06)  POCT Blood Glucose.: 291 mg/dL (2019 17:04)    ABG - ( 2019 23:36 )  pH, Arterial: 7.34  pH, Blood: x     /  pCO2: 51    /  pO2: 133   / HCO3: 25    / Base Excess: 1.5   /  SaO2: 99        MEDICATIONS  (STANDING):  ALBUTerol/ipratropium for Nebulization 3 milliLiter(s) Nebulizer every 4 hours  aspirin enteric coated 81 milliGRAM(s) Oral daily  atorvastatin 40 milliGRAM(s) Oral at bedtime  azithromycin  IVPB 500 milliGRAM(s) IV Intermittent every 24 hours  azithromycin  IVPB      buDESOnide    Inhalation Suspension 0.5 milliGRAM(s) Inhalation two times a day  clopidogrel Tablet 75 milliGRAM(s) Oral daily  dextrose 5%. 1000 milliLiter(s) (50 mL/Hr) IV Continuous <Continuous>  dextrose 50% Injectable 12.5 Gram(s) IV Push once  dextrose 50% Injectable 25 Gram(s) IV Push once  dextrose 50% Injectable 25 Gram(s) IV Push once  enoxaparin Injectable 40 milliGRAM(s) SubCutaneous daily  insulin lispro (HumaLOG) corrective regimen sliding scale   SubCutaneous every 6 hours  insulin lispro (HumaLOG) corrective regimen sliding scale   SubCutaneous at bedtime  methylPREDNISolone sodium succinate Injectable 40 milliGRAM(s) IV Push every 8 hours  metoprolol tartrate 25 milliGRAM(s) Oral two times a day  multivitamin 1 Tablet(s) Oral daily  sodium chloride 0.9%. 1000 milliLiter(s) (50 mL/Hr) IV Continuous <Continuous>  tamsulosin 0.4 milliGRAM(s) Oral at bedtime    MEDICATIONS  (PRN):  dextrose 40% Gel 15 Gram(s) Oral once PRN Blood Glucose LESS THAN 70 milliGRAM(s)/deciliter  glucagon  Injectable 1 milliGRAM(s) IntraMuscular once PRN Glucose LESS THAN 70 milligrams/deciliter      Diet: NPO    RADIOLOGY & ADDITIONAL TESTS:    < from: CT Angio Chest w/ IV Cont (19 @ 15:45) >    EXAM:  CT ANGIO CHEST (W)AW IC                          PROCEDURE DATE:  2019      INTERPRETATION:  CLINICAL INFORMATION: COPD, short of breath    COMPARISON: 2018    PROCEDURE:   CT Angiography of the Chest.  90 ml of Omnipaque 350 was injected intravenously. 10 ml were discarded.  Sagittal and coronal reformats were performed as well as 3D (MIP)   reconstructions.      FINDINGS:    LUNGS AND AIRWAYS: Patent central airways.  Centrilobular emphysematous   changes similar to prior. Bibasilar bronchiectasis and mucoid impacted   right lower lobe bronchus similar to prior. No focal consolidation.    PLEURA: No pleural effusion.    MEDIASTINUM AND FRANKLYN: No lymphadenopathy.    VESSELS: No pulmonary embolism. Nonaneurysmal thoracic aorta    HEART: Coronary artery calcification No pericardial effusion.    CHEST WALL AND LOWER NECK: Status post median sternotomy.    VISUALIZED UPPER ABDOMEN: Calcified gallstone    BONES: No acute.    IMPRESSION:     No evidence of pulmonary emboli.    Centrilobular emphysema with bilateral bronchiectatic changes and mucoid   impacted right lower lobe bronchus.    < end of copied text >    < from: US Duplex Venous Lower Ext Complete, Bilateral (19 @ 12:05) >    EXAM:  US DPLX LWR EXT VEINS COMPL BI                            PROCEDURE DATE:  2019          INTERPRETATION:  CLINICAL INFORMATION: Shortness of breath. No other   clinical information is provided.    COMPARISON: Prior duplex evaluation ofthe lower extremities dated   2019.    TECHNIQUE: Duplex sonography of the BILATERAL LOWER extremity veins with   color and spectral Doppler, with and without compression.      FINDINGS:    There is normal compressibility of the bilateral commonfemoral, femoral   and popliteal veins.     Doppler examination shows normal spontaneous and phasic flow.    No calf vein thrombosis is detected.    There is a small right-sided popliteal fossa cyst which measures 1.3 x   1.2 x 0.4 cm.    IMPRESSION:    No evidence of deep venous thrombosis in either lower extremity.      < end of copied text >    < from: Xray Chest 1 View-PORTABLE IMMEDIATE (19 @ 10:50) >    EXAM:  XR CHEST PORTABLE IMMED 1V                            PROCEDURE DATE:  2019          INTERPRETATION:  Sepsis.    AP chest. Prior 7/10/2019.    Left costophrenic angle excluded.    Status post median sternotomy. No change heart mediastinum. Hyperinflated   emphysematous lungs. No consolidation pneumothorax or effusion.    Impression: Hyperinflated lungs. No active infiltrates. Stable exam.      < end of copied text >      Imaging Personally Reviewed:  [ x ] YES  [ ] NO    Consultant(s) Notes Reviewed:  [ x ] YES  [ ] NO    DVT Prophylaxis:  Subcu Heparin [  ]     LMWH [ x ]     Coumadin [ ]    Xaeralto [ ]    Eliquis [ ]   Venodyne pumps [ ]    Discussed with Patient [ x ]     Family [ ]          [ ]   RN[ ]      [ ]

## 2019-07-23 NOTE — DIETITIAN INITIAL EVALUATION ADULT. - ADD RECOMMEND
1) Recommend to change diet to Consistent CHO, DASH/TLC, 2) Pt provided with reinforced oral and written diet education on Consistent CHO, heart healthy diet; Topics covered: foods high in sodium that should be avoided, using alternative spices and herbs, meal planning/ consuming CHO with protein, complex vs. simple CHO, 3) Pt encouraged to continue good PO intake, 4) Monitor pt's PO intake, weight, skin, edema, GI distress 1) Recommend to change diet to Consistent CHO, DASH/TLC, 2) Pt provided with reinforced oral and written diet education on Consistent CHO, heart healthy diet; Topics covered: foods high in sodium that should be avoided, using alternative spices and herbs, meal planning/ consuming CHO with protein, complex vs. simple CHO, 3) Pt encouraged to continue good PO intake, 4) Recommend to obtain pt's updated HgbA1c, 5) Monitor pt's PO intake, weight, skin, edema, GI distress

## 2019-07-23 NOTE — DIETITIAN INITIAL EVALUATION ADULT. - OTHER INFO
As per chart pt is a 79 year old male with a PMH of HTN, COPD/2L home O2/nightly BiPAP, hx cardiac arrest 2018, c/f cardiac dz/planned for LHC today at [facility]; recent ED visit 7/2019 for COPD exac, d/c'd on prednisone; adm w/sob x past 1d, worsening despite home regimen of nebs, steroids; labs w/ABG 7.21/74/112, WBC 16.06, CXR neg, CTA chest neg for PE, LE dopplers neg - pt being managed for acute hypercapneic resp failure in setting of COPD exac - on BiPAP. Pt admitted for COPD exacerbation.    Pt reports good appetite and PO intake PTA. Pt reports his wife prepares his meals for him at home and states he follows a low cholesterol, low sodium, Consistent CHO diet PTA, reports consuming little meat and a lot of vegetables and fruits. Pt is on glipizide and metformin. Per chart pt's last HgbA1c (5/25/19) 6.4%. PTA Supplement use PTA includes MVI, Vitamin C. Per chart pt with food allergy to shellfish.    Pt reports currently good appetite and PO intake. Pt's current weight per chart (7/23) 208.7lbs, (admission wt) 207lbs. Pt reports current and UBW of 207lbs, stable, denies any recent weight changes. Per previous RD note pt's weight (4/26/19) 207lbs, indicates pt's weight has remained stable. Denies any chewing/ swallowing difficulties, denies any nausea/ vomiting/ diarrhea/ constipation, +BM 7/22.     Suspected DTI bridge of nose  +1 generalized edema

## 2019-07-24 ENCOUNTER — TRANSCRIPTION ENCOUNTER (OUTPATIENT)
Age: 79
End: 2019-07-24

## 2019-07-24 ENCOUNTER — APPOINTMENT (OUTPATIENT)
Dept: PULMONOLOGY | Facility: CLINIC | Age: 79
End: 2019-07-24

## 2019-07-24 VITALS
OXYGEN SATURATION: 99 % | TEMPERATURE: 98 F | HEART RATE: 102 BPM | RESPIRATION RATE: 18 BRPM | DIASTOLIC BLOOD PRESSURE: 69 MMHG | SYSTOLIC BLOOD PRESSURE: 131 MMHG

## 2019-07-24 LAB
ALBUMIN SERPL ELPH-MCNC: 3.1 G/DL — LOW (ref 3.3–5)
ALP SERPL-CCNC: 62 U/L — SIGNIFICANT CHANGE UP (ref 40–120)
ALT FLD-CCNC: 26 U/L — SIGNIFICANT CHANGE UP (ref 12–78)
ANION GAP SERPL CALC-SCNC: 3 MMOL/L — LOW (ref 5–17)
AST SERPL-CCNC: 10 U/L — LOW (ref 15–37)
BASE EXCESS BLDA CALC-SCNC: 6.7 MMOL/L — HIGH (ref -2–2)
BILIRUB SERPL-MCNC: 0.3 MG/DL — SIGNIFICANT CHANGE UP (ref 0.2–1.2)
BLOOD GAS COMMENTS ARTERIAL: SIGNIFICANT CHANGE UP
BLOOD GAS COMMENTS ARTERIAL: SIGNIFICANT CHANGE UP
BUN SERPL-MCNC: 18 MG/DL — SIGNIFICANT CHANGE UP (ref 7–23)
CALCIUM SERPL-MCNC: 9.2 MG/DL — SIGNIFICANT CHANGE UP (ref 8.5–10.1)
CHLORIDE SERPL-SCNC: 106 MMOL/L — SIGNIFICANT CHANGE UP (ref 96–108)
CO2 SERPL-SCNC: 36 MMOL/L — HIGH (ref 22–31)
CREAT SERPL-MCNC: 1.1 MG/DL — SIGNIFICANT CHANGE UP (ref 0.5–1.3)
CULTURE RESULTS: SIGNIFICANT CHANGE UP
GLUCOSE SERPL-MCNC: 146 MG/DL — HIGH (ref 70–99)
HCO3 BLDA-SCNC: 30 MMOL/L — HIGH (ref 23–27)
HCT VFR BLD CALC: 36.2 % — LOW (ref 39–50)
HGB BLD-MCNC: 11.2 G/DL — LOW (ref 13–17)
HOROWITZ INDEX BLDA+IHG-RTO: 30 — SIGNIFICANT CHANGE UP
INR BLD: 1.02 RATIO — SIGNIFICANT CHANGE UP (ref 0.88–1.16)
MCHC RBC-ENTMCNC: 28.2 PG — SIGNIFICANT CHANGE UP (ref 27–34)
MCHC RBC-ENTMCNC: 30.9 GM/DL — LOW (ref 32–36)
MCV RBC AUTO: 91.2 FL — SIGNIFICANT CHANGE UP (ref 80–100)
NRBC # BLD: 0 /100 WBCS — SIGNIFICANT CHANGE UP (ref 0–0)
PCO2 BLDA: 56 MMHG — HIGH (ref 32–46)
PH BLDA: 7.38 — SIGNIFICANT CHANGE UP (ref 7.35–7.45)
PHOSPHATE SERPL-MCNC: 3.5 MG/DL — SIGNIFICANT CHANGE UP (ref 2.5–4.5)
PLATELET # BLD AUTO: 207 K/UL — SIGNIFICANT CHANGE UP (ref 150–400)
PO2 BLDA: 143 MMHG — HIGH (ref 74–108)
POTASSIUM SERPL-MCNC: 4.2 MMOL/L — SIGNIFICANT CHANGE UP (ref 3.5–5.3)
POTASSIUM SERPL-SCNC: 4.2 MMOL/L — SIGNIFICANT CHANGE UP (ref 3.5–5.3)
PROT SERPL-MCNC: 5.6 G/DL — LOW (ref 6–8.3)
PROTHROM AB SERPL-ACNC: 11.5 SEC — SIGNIFICANT CHANGE UP (ref 10–12.9)
RBC # BLD: 3.97 M/UL — LOW (ref 4.2–5.8)
RBC # FLD: 13.5 % — SIGNIFICANT CHANGE UP (ref 10.3–14.5)
SAO2 % BLDA: 99 % — HIGH (ref 92–96)
SODIUM SERPL-SCNC: 145 MMOL/L — SIGNIFICANT CHANGE UP (ref 135–145)
SPECIMEN SOURCE: SIGNIFICANT CHANGE UP
WBC # BLD: 13.55 K/UL — HIGH (ref 3.8–10.5)
WBC # FLD AUTO: 13.55 K/UL — HIGH (ref 3.8–10.5)

## 2019-07-24 PROCEDURE — 99221 1ST HOSP IP/OBS SF/LOW 40: CPT

## 2019-07-24 PROCEDURE — 82962 GLUCOSE BLOOD TEST: CPT

## 2019-07-24 PROCEDURE — 85027 COMPLETE CBC AUTOMATED: CPT

## 2019-07-24 PROCEDURE — 84100 ASSAY OF PHOSPHORUS: CPT

## 2019-07-24 PROCEDURE — 71275 CT ANGIOGRAPHY CHEST: CPT

## 2019-07-24 PROCEDURE — 36600 WITHDRAWAL OF ARTERIAL BLOOD: CPT

## 2019-07-24 PROCEDURE — 85730 THROMBOPLASTIN TIME PARTIAL: CPT

## 2019-07-24 PROCEDURE — 84145 PROCALCITONIN (PCT): CPT

## 2019-07-24 PROCEDURE — 83605 ASSAY OF LACTIC ACID: CPT

## 2019-07-24 PROCEDURE — 96374 THER/PROPH/DIAG INJ IV PUSH: CPT

## 2019-07-24 PROCEDURE — 93970 EXTREMITY STUDY: CPT

## 2019-07-24 PROCEDURE — 83735 ASSAY OF MAGNESIUM: CPT

## 2019-07-24 PROCEDURE — 80053 COMPREHEN METABOLIC PANEL: CPT

## 2019-07-24 PROCEDURE — 81003 URINALYSIS AUTO W/O SCOPE: CPT

## 2019-07-24 PROCEDURE — 94760 N-INVAS EAR/PLS OXIMETRY 1: CPT

## 2019-07-24 PROCEDURE — 36415 COLL VENOUS BLD VENIPUNCTURE: CPT

## 2019-07-24 PROCEDURE — 87040 BLOOD CULTURE FOR BACTERIA: CPT

## 2019-07-24 PROCEDURE — 87086 URINE CULTURE/COLONY COUNT: CPT

## 2019-07-24 PROCEDURE — 99291 CRITICAL CARE FIRST HOUR: CPT | Mod: 25

## 2019-07-24 PROCEDURE — 94660 CPAP INITIATION&MGMT: CPT

## 2019-07-24 PROCEDURE — 71045 X-RAY EXAM CHEST 1 VIEW: CPT

## 2019-07-24 PROCEDURE — 82803 BLOOD GASES ANY COMBINATION: CPT

## 2019-07-24 PROCEDURE — 99239 HOSP IP/OBS DSCHRG MGMT >30: CPT | Mod: GC

## 2019-07-24 PROCEDURE — 85379 FIBRIN DEGRADATION QUANT: CPT

## 2019-07-24 PROCEDURE — 85610 PROTHROMBIN TIME: CPT

## 2019-07-24 PROCEDURE — 94640 AIRWAY INHALATION TREATMENT: CPT

## 2019-07-24 PROCEDURE — 93005 ELECTROCARDIOGRAM TRACING: CPT

## 2019-07-24 PROCEDURE — 84484 ASSAY OF TROPONIN QUANT: CPT

## 2019-07-24 RX ORDER — ALBUTEROL 90 UG/1
2 AEROSOL, METERED ORAL
Qty: 0 | Refills: 0 | DISCHARGE

## 2019-07-24 RX ORDER — FLUTICASONE FUROATE AND VILANTEROL TRIFENATATE 100; 25 UG/1; UG/1
1 POWDER RESPIRATORY (INHALATION)
Qty: 0 | Refills: 0 | DISCHARGE

## 2019-07-24 RX ORDER — UMECLIDINIUM 62.5 UG/1
1 AEROSOL, POWDER ORAL
Qty: 0 | Refills: 0 | DISCHARGE

## 2019-07-24 RX ORDER — INSULIN LISPRO 100/ML
7 VIAL (ML) SUBCUTANEOUS ONCE
Refills: 0 | Status: COMPLETED | OUTPATIENT
Start: 2019-07-24 | End: 2019-07-24

## 2019-07-24 RX ORDER — AZITHROMYCIN 500 MG/1
1 TABLET, FILM COATED ORAL
Qty: 3 | Refills: 0
Start: 2019-07-24 | End: 2019-07-26

## 2019-07-24 RX ORDER — AZITHROMYCIN 500 MG/1
500 TABLET, FILM COATED ORAL DAILY
Refills: 0 | Status: DISCONTINUED | OUTPATIENT
Start: 2019-07-24 | End: 2019-07-24

## 2019-07-24 RX ORDER — METFORMIN HYDROCHLORIDE 850 MG/1
1 TABLET ORAL
Qty: 0 | Refills: 0 | DISCHARGE

## 2019-07-24 RX ORDER — INSULIN LISPRO 100/ML
VIAL (ML) SUBCUTANEOUS
Refills: 0 | Status: DISCONTINUED | OUTPATIENT
Start: 2019-07-24 | End: 2019-07-24

## 2019-07-24 RX ORDER — INSULIN LISPRO 100/ML
5 VIAL (ML) SUBCUTANEOUS ONCE
Refills: 0 | Status: DISCONTINUED | OUTPATIENT
Start: 2019-07-24 | End: 2019-07-24

## 2019-07-24 RX ADMIN — Medication 3 MILLILITER(S): at 14:52

## 2019-07-24 RX ADMIN — CLOPIDOGREL BISULFATE 75 MILLIGRAM(S): 75 TABLET, FILM COATED ORAL at 12:12

## 2019-07-24 RX ADMIN — Medication 81 MILLIGRAM(S): at 12:12

## 2019-07-24 RX ADMIN — Medication 40 MILLIGRAM(S): at 05:59

## 2019-07-24 RX ADMIN — Medication 2: at 08:23

## 2019-07-24 RX ADMIN — ENOXAPARIN SODIUM 40 MILLIGRAM(S): 100 INJECTION SUBCUTANEOUS at 12:12

## 2019-07-24 RX ADMIN — SODIUM CHLORIDE 50 MILLILITER(S): 9 INJECTION INTRAMUSCULAR; INTRAVENOUS; SUBCUTANEOUS at 08:25

## 2019-07-24 RX ADMIN — Medication 3 MILLILITER(S): at 11:14

## 2019-07-24 RX ADMIN — Medication 7 UNIT(S): at 13:34

## 2019-07-24 RX ADMIN — Medication 3 MILLILITER(S): at 07:24

## 2019-07-24 RX ADMIN — Medication 0.5 MILLIGRAM(S): at 07:24

## 2019-07-24 RX ADMIN — Medication 8: at 16:56

## 2019-07-24 RX ADMIN — Medication 8: at 12:12

## 2019-07-24 RX ADMIN — AZITHROMYCIN 500 MILLIGRAM(S): 500 TABLET, FILM COATED ORAL at 13:28

## 2019-07-24 RX ADMIN — Medication 1 TABLET(S): at 12:12

## 2019-07-24 RX ADMIN — Medication 3 MILLILITER(S): at 05:35

## 2019-07-24 NOTE — DISCHARGE NOTE PROVIDER - CARE PROVIDER_API CALL
Arun Dejesus)  Critical Care Medicine; Internal Medicine; Pulmonary Disease  [address] [place] [address]  Phone: [phone]  Fax: [phone]  Follow Up Time: 1 week

## 2019-07-24 NOTE — PROGRESS NOTE ADULT - SUBJECTIVE AND OBJECTIVE BOX
Patient chart was reviewed Patient has not been examined yet but will be done so later today and following that  the final note will be entered in the space below Workup and management orders based on current assessment have been entered to expedite patient care  Nursing notified for stat orders as applicable Meanwhile please refer to my previous Pulmonary Critical Care notes on this patient or call me directly COMMODORE TURNER [facility] P [phone]  1940 DOA 2019   ALLERGY Shellfish  [facility]       Initial evaluation/Pulmonary Critical Care consultation requested on  2019  by Dr CARRILLO   from Dr Dejesus   Patient examined chart reviewed    HOSPITAL ADMISSION   PATIENT CAME  FROM (if information available)        TYPE OF VISIT      Subsequent Pulmonary followup     REASON FOR VISIT  PLEASE SEE PROBLEM LIST/ASSESSMENTAND RECOMMENDATIONS     PATIENT COMMODORE TURNER [facility] P [phone]  1940 DOA 2019     VITALS/LABS    2019 afeb 79 130/70 17 100%   2019 1a 16/6/.3   2019 w 13.5 Hb 11.2 Plt 207  Na 145 K 4.2 CO2 36 cR 1.1            PATIENT COMMODORE TURNER [facility] P [phone]  1940 DOA 2019     MEDICATIONS      CAD   asa 81 ()   plavx 75 ()   metoprolol 25.2 ()   DVT p   lvnx 40 ()   COPD   azithjro ()   solumed 40.2 ()   duoneb  pulmicort   IV  NS 50 ()   DM   iss ()       REVIEW OF SYMPTOMS     NOTE Changess if any  in ROS and PE are updated in daily HOSPITAL COURSE below      Able to give ROS  Yes     RELIABLE No   CONSTITUTIONAL Weakness Yes  Chills No Vision changes No  ENDOCRINE No unexplained hair loss No heat or cold intolerance    ALLERGY No hives  Sore throat No   RESP Coughing blood no  Shortness of breath YES   NEURO No Headache  Confusion Pain neck No   CARDIAC No Chest pain No Palpitations   GI No Pain abdomen NO   Vomiting NO     PHYSICAL EXAM    HEENT Unremarkable PERRLA atraumatic   RESP Fair air entry EXP prolonged    Harsh breath sound Resp distres mild   CARDIAC S1 S2 No S3     NO JVD    ABDOMEN SOFT BS PRESENT NOT DISTENDED No hepatosplenomegaly PEDAL EDEMA present No calf tenderness  NO rash   GENERAL Not TOXIC

## 2019-07-24 NOTE — DISCHARGE NOTE PROVIDER - HOSPITAL COURSE
FROM ADMISSION H+P:     HPI:    79M, HTN, COPD/2L home O2/nightly BiPAP, hx cardiac arrest 2018, c/f cardiac dz/planned for LHC today at [facility]; recent ED visit 7/2019 for COPD exac, d/c'd on prednisone; adm w/sob x past 1d, worsening despite home regimen of nebs, steroids; denies cough, fevers, chills, recent sick contacts.        In ED, labs w/ABG 7.2/74/112 ON 35%; pt placed on BiPAP; labs w/WBC 16.06, neg Ddimer, neg trop, lactate wnl; CXR neg for infiltrates; LE dopplers done and NEG; CTA chest done and NEG. (22 Jul 2019 13:08)            ---    HOSPITAL COURSE: The patient was admitted for acute hypercapnic respiratory failure 2/2 an acute on chronic COPD exacerbation.         Patient was medically optimized and improved clinically throughout hospital. Patient seen and examined on day of discharge.            Vital Signs Last 24 Hrs    T(C): 36.5 (24 Jul 2019 07:51), Max: 36.9 (23 Jul 2019 11:53)    T(F): 97.7 (24 Jul 2019 07:51), Max: 98.5 (23 Jul 2019 11:53)    HR: 79 (24 Jul 2019 07:51) (70 - 98)    BP: 132/71 (24 Jul 2019 07:51) (106/64 - 132/75)    BP(mean): --    RR: 17 (24 Jul 2019 07:51) (17 - 21)    SpO2: 100% (24 Jul 2019 07:51) (96% - 100%)        Physical Exam:    General: NAD, well-developed, resting comfortably in chair     HEENT: NC/AT, EOMI b/l, moist mucous membranes    Neurology: AA&Ox3, motor strength grossly intact      Respiratory: CTA B/L, No wheezing, rales, or rhonchi    CV: RRR, S1/S2 present, no murmurs, rubs, or gallops    Abdominal: Soft, nontender, non-distended, normoactive bowel sounds    Extremities: No C/C/E, peripheral pulses present    MSK: Normal ROM, no joint erythema or warmth, no joint swelling     Skin: warm, dry, normal color, no obvious rash or abnormal lesions        Patient is medically stable and cleared for discharge to home with outpatient pulmonary follow up.        ---    CONSULTANTS:     Pulmonary: Oleg         --- FROM ADMISSION H+P:     HPI:    79M, HTN, COPD/2L home O2/nightly BiPAP, hx cardiac arrest 2018, c/f cardiac dz/planned for LHC today at [facility]; recent ED visit 7/2019 for COPD exac, d/c'd on prednisone; adm w/sob x past 1d, worsening despite home regimen of nebs, steroids; denies cough, fevers, chills, recent sick contacts.        In ED, labs w/ABG 7.2/74/112 ON 35%; pt placed on BiPAP; labs w/WBC 16.06, neg Ddimer, neg trop, lactate wnl; CXR neg for infiltrates; LE dopplers done and NEG; CTA chest done and NEG. (22 Jul 2019 13:08)            ---    HOSPITAL COURSE: The patient was admitted for acute hypercapnic respiratory failure 2/2 an acute on chronic COPD exacerbation. The patient was medically managed with duonebs q4h, inhalters BID and a stress dose of steroids followed by 40mg IV q12h of methylprednisolone. The patient was encouraged to increase use of BiPAP as tolerated. The patient was started on Azithromycin 500mg IV daily and will be discharged with a 3 day supply to complete the course. The patient will be discharged with a steroid taper. The patient was medically optimized and improved clinically throughout hospital. Patient seen and examined on day of discharge.            Vital Signs Last 24 Hrs    T(C): 36.5 (24 Jul 2019 07:51), Max: 36.9 (23 Jul 2019 11:53)    T(F): 97.7 (24 Jul 2019 07:51), Max: 98.5 (23 Jul 2019 11:53)    HR: 79 (24 Jul 2019 07:51) (70 - 98)    BP: 132/71 (24 Jul 2019 07:51) (106/64 - 132/75)    BP(mean): --    RR: 17 (24 Jul 2019 07:51) (17 - 21)    SpO2: 100% (24 Jul 2019 07:51) (96% - 100%)        Physical Exam:    General: NAD, well-developed, resting comfortably in chair     HEENT: NC/AT, EOMI b/l, moist mucous membranes    Neurology: AA&Ox3, motor strength grossly intact      Respiratory: +b/l end expiratory wheezing, decr breath sounds b/l    CV: RRR, +S1/S2, no murmurs appreciated     Abdominal: Soft, NTND, +bowel sounds x4 quadrants     Extremities: 2+ peripheral pulses. +R. LE nonpitting edema              Patient is medically stable and cleared for discharge to home with outpatient pulmonary and cardiology follow up.        ---    CONSULTANTS:     Pulmonary: Dr. Dejesus     Critical Care: Dr. Aviva Babb         --- FROM ADMISSION H+P:     HPI:    79M, HTN, COPD/2L home O2/nightly BiPAP, hx cardiac arrest 2018, c/f cardiac dz/planned for LHC today at [facility]; recent ED visit 7/2019 for COPD exac, d/c'd on prednisone; adm w/sob x past 1d, worsening despite home regimen of nebs, steroids; denies cough, fevers, chills, recent sick contacts.            ---    HOSPITAL COURSE: The patient was admitted for acute  on chr  hypercapnic respiratory failure 2/2 an acute on chronic COPD exacerbation    started iv steriod and bipap .     The patient was medically managed with duonebs q4h, inhalters BID and a stress dose of steroids followed by 40mg IV q12h of methylprednisolone. The patient was encouraged to increase use of BiPAP as tolerated.       The patient was started on Azithromycin 500mg IV daily and will be discharged with a 3 day supply to complete the course. The patient will be discharged with a steroid taper. The patient was medically optimized and improved clinically throughout hospital.  hyperglycemia sec to on steroid  induce advice given increase dose home dm meds if bs remain elevated on steriod and fu pmd with in 1wk. CTA chest neg for PE, LE dopplers neg.    Patient seen and examined on day of discharge -    7-24-19         Vital Signs Last 24 Hrs    T(C): 36.5 (24 Jul 2019 07:51), Max: 36.9 (23 Jul 2019 11:53)    T(F): 97.7 (24 Jul 2019 07:51), Max: 98.5 (23 Jul 2019 11:53)    HR: 79 (24 Jul 2019 07:51) (70 - 98)    BP: 132/71 (24 Jul 2019 07:51) (106/64 - 132/75)    BP(mean): --    RR: 17 (24 Jul 2019 07:51) (17 - 21)    SpO2: 100% (24 Jul 2019 07:51) (96% - 100%)     7-24-19     Physical Exam day of dc     General: NAD, well-developed,     HEENT: NC/AT, EOMI b/l, moist mucous membranes    Neurology: AA&Ox3, motor strength grossly intact      Respiratory: +b/l end expiratory wheezing, decr breath sounds b/l    CV: RRR, +S1/S2, no murmurs , no tachy     Abdominal: Soft, NTND, +bowel sounds x4 quadrants     Extremities: 2+ peripheral pulses. +R. LE nonpitting edema              Patient is medically stable and cleared for discharge to home with outpatient pulmonary and cardiology  with in 1wk follow up.        ---    CONSULTANTS:     Pulmonary: Dr. Dejesus     Critical Care: Dr. Aviva Babb         ---

## 2019-07-24 NOTE — DISCHARGE NOTE PROVIDER - NSDCCPCAREPLAN_GEN_ALL_CORE_FT
PRINCIPAL DISCHARGE DIAGNOSIS  Diagnosis: COPD exacerbation  Assessment and Plan of Treatment:       SECONDARY DISCHARGE DIAGNOSES  Diagnosis: Type 2 diabetes mellitus with hyperglycemia, unspecified whether long term insulin use  Assessment and Plan of Treatment:     Diagnosis: CAD (Coronary Artery Disease)  Assessment and Plan of Treatment: PRINCIPAL DISCHARGE DIAGNOSIS  Diagnosis: COPD exacerbation  Assessment and Plan of Treatment: You have a history of COPD. In the hospital you were treated for an exacerbation of this condition. Continue using your home inhalers, nebulizer treatments, supplemental oxygen and BiPAP machine upon discharge. We encourage you to increase your use of BiPAP as tolerated. Follow the instructions above for your steroid taper instructions. Continue your antibiotic course of Azithrymycin 500mg PO for 3 days. Follow-up with your pulmonologist, Dr. Dejesus, within one week of discharge for further monitoring of this condition. Please contact your doctor or go to the ER if you are experiencing any of the following: Dizziness, changes in vision, changes in mental status, worsening shortness of breath, increased work of breathing or chest pain.           SECONDARY DISCHARGE DIAGNOSES  Diagnosis: Type 2 diabetes mellitus with hyperglycemia, unspecified whether long term insulin use  Assessment and Plan of Treatment: Please continue your home medications and diet for the management of your type 2 diabetes mellitus. Please take and record your blood sugars regularly, especially in the setting of steroid use.    Diagnosis: CAD (Coronary Artery Disease)  Assessment and Plan of Treatment: Please continue your home aspirin, Plavix, Lopressor and Lipitor. Please follow up with your cardiologist within 1 week of discharge.

## 2019-07-24 NOTE — PHARMACOTHERAPY INTERVENTION NOTE - COMMENTS
Reviewed patient's discharge medications, new and changed medications. Patient and spouse present. Discussed increasing glipizide dose to accommodate for increased blood sugar as a result of steroid taper. Patient will remain on home dose of metoprolol 12.5mg bid
Spoke with Dr Dejesus re iv azithromycin. Chest xray negative.  wanted to continue azithromycin for 3 more days. Agreed to changing to po

## 2019-07-24 NOTE — DISCHARGE NOTE PROVIDER - NSDCFUADDAPPT_GEN_ALL_CORE_FT
you are on po steroid  check bs closely   increase your  dm meds  as  needed  fu your pmd with in 1wk .

## 2019-07-24 NOTE — DISCHARGE NOTE NURSING/CASE MANAGEMENT/SOCIAL WORK - NSDCDPATPORTLINK_GEN_ALL_CORE
You can access the [facility] Portal, offered by [facility], by registering with the following website: [email or web address]

## 2019-07-24 NOTE — PROGRESS NOTE ADULT - ASSESSMENT
PATIENT COMMODORE TURNER [facility] P [phone]  1940 DOA 2019                            Patient description                          79 m former smoker PMH HTN, DM2 (on home Metformin and glipizide), COPD (2L home/ BIPAP at night), CAD with 3v CABG , HLD, 10/26/2018 ECHO ef 55% hx hypercapnic resp failure with ho intubation c/b with cardiac arrest (2018) with ho recurrent admissions GOLD D admitted [facility] P 2019 p with progressive resp distress  seesawing in er placed on BPAP No fever no chest pain    Hospital course                                  AC HYPERCAPNIC RESP FAILURE Responded as during previous visits        PATIENT  COMMODORE TURNER [facility] P [phone]  1940 DOA 2019                            PROBLEM/ASSESSMENT/RECOMMENDATIONS (A/R)         AOC HYPERCAPNIC RESP FAILURE   2019 16/6/.3 738/56/143    11p bpap16/6/.3 734/51/133   2019 721/74/112 3l   A/R Gas exchange improved with bpap following his usual post-hospitalization course seen previously   DC planning  or      COPD ex  COPD (2L home/ BIPAP at night) Has COPD GOLD D with ac exacerbation   A/R On BD azithro steroids  Taper steroids Cont Rx    2019 Taper pred 30 to 10 mgeod within 6 d then keep on Pred 10 eod as per fam wishes (Discussed previously)     RESP TRACT INFECTION  pc n A/R Bacterial infection unlikely    RO VTE  ddimer n  v duplex n A/R VTE ruled out     RO MI PAST H CAD with 3v CABG 2004  10/26/2018 ECHO ef 55% - Tr 1 n.3   A/R MI ruled out       TIME SPENT Over 25 minutes aggregate care time spent on encounter; activities included   direct pt care, counseling and/or coordinating care reviewing notes, lab data/ imaging , discussion with multidisciplinary team/ pt /family. Risks, benefits, alternatives  discussed in detail

## 2019-07-27 NOTE — PATIENT PROFILE ADULT - PRIMARY SOURCE OF SUPPORT/COMFORT
Patient getting oxycodone for pain in right foot. Refused offer of wheelchair to walk out to smoke. Encouraged to keep legs elevated. Right foot and ankle edematous with some pink at old incisions. Kept NPO pending potential surgical intervention. Podiatrist has not rounded as yet.    child(armando)/extended family

## 2019-07-31 ENCOUNTER — APPOINTMENT (OUTPATIENT)
Dept: PULMONOLOGY | Facility: CLINIC | Age: 79
End: 2019-07-31
Payer: MEDICARE

## 2019-07-31 VITALS
RESPIRATION RATE: 17 BRPM | HEIGHT: 71 IN | SYSTOLIC BLOOD PRESSURE: 104 MMHG | HEART RATE: 93 BPM | OXYGEN SATURATION: 98 % | DIASTOLIC BLOOD PRESSURE: 68 MMHG | WEIGHT: 207 LBS | BODY MASS INDEX: 28.98 KG/M2

## 2019-07-31 PROCEDURE — 94010 BREATHING CAPACITY TEST: CPT

## 2019-07-31 PROCEDURE — 99214 OFFICE O/P EST MOD 30 MIN: CPT | Mod: 25

## 2019-07-31 NOTE — ASSESSMENT
[FreeTextEntry1] : \par COMMODORE TURNER MRN [id number]   1940  INITIAL VISIT (IV) 2019  AGE (IV) 79  SEX  M REFERRING MD DR DIANA GONSALES \par ALLERGY nka\par CONTACT  H Phone [phone]\par \par PATIENT COMMODORE TURNER MRN [id number]   1940  INITIAL VISIT (IV) 2019  AGE (IV) 79  SEX  M REFERRING MD DR DIANA GONSALES \par \par DATE OF VISIT    \par 2019\par \par REASON FOR VISIT\par Please see PULMONARY PROBLEMS \par \par REFERING MD \par DIANA GONSALES MD \par \par TYPE OF VISIT \par Subsequent Pulmonary followup \par \par PATIENT COMMODORE TURNER MRN [id number]   1940  INITIAL VISIT (IV) 2019  AGE (IV) 79  SEX  M REFERRING MD DR DIANA GONSALES \par \par DATE COMPLAINTS NOTABLE POINTS  ROS  PE    \par 2019 Is rescheduled for 2019 for cath\par 2019 Was Dced from hospital recently Has O2 Has concentrator and has BPAP Is waiting for inogen \par 2019 Sent text to Kelly SCHMIDT to help arrange INOGEN requested by patient as his current portable tank lasts only 1 h and weighs less \par \par PATIENT COMMODORE TURNER MRN [id number]   1940  INITIAL VISIT (IV) 2019  AGE (IV) 79  SEX  M REFERRING MD DR DIANA GONSALES \par \par PT DESCRIPTION  SUMMARY   \par 79 m referred 2019 by Dr Gonsales for COPD \par Patient is  former smoker PMH HTN, DM2 (on home Metformin and glipizide), COPD (2L home/ BIPAP at night), CAD with 3v CABG 2004, HLD, 10/26/2018 ECHO ef 55% hx hypercapnic resp failure with ho intubation c/b with cardiac arrest (2018) with ho recurrent admissions Is COPD GOLD D \par Has home O2 Home niv Home neb       \par \par PATIENT COMMODORE TURNER MRN [id number]   1940  INITIAL VISIT (IV) 2019  AGE (IV) 79  SEX  M REFERRING MD DR DIANA GONSALES \par \par MEDICATIONS/MANAGEMENT \par 2019   \par Breo 200 \par Incruse \par Daliresp\par Albuterol neb p (2019 Avge 3/d) \par Ventolin p \par Glipizide 2.5x2 \par metformin 1000.2 \par Lopressor 12.5x2 \par Valsart 80  \par Tamsulosin .4 \par Atorvastat 40 \par Plavix 75  \par \par \par   \par \par PATIENT COMMODORE TURNER MRN [id number]   1940  INITIAL VISIT (IV) 2019  AGE (IV) 79  SEX  M REFERRING MD DR DIANA GONSALES \par \par PROBLEMS ASSESSMENT/RECOMMENDATIONS (A/R) \par COPD \par \par BACKGROUND \par 79 m referred 2019 by Dr Gonsales for COPD \par Patient is  former smoker PMH HTN, DM2 (on home Metformin and glipizide), COPD (2L home/ BIPAP at night), CAD with 3v CABG , HLD, 10/26/2018 ECHO ef 55% hx hypercapnic resp failure with ho intubation c/b with cardiac arrest (2018) with ho recurrent admissions Is COPD GOLD D \par Has home O2 Home niv Home neb       \par \par    \par                            \par \par WORKUP \par LABS \par 2019 w 13.5 Hb 11.2 Plt 207  Na 145 K 4.2 CO2 36 cR 1.1    \par \par ABGs\par 2019 16/6/.3 738/56/143 \par  11p bpap16/6/.3 734/51/133 \par 2019 721/74/112 3l \par \par XR\par 2019 cxr hyperinflated lungs \par \par VTE MOSELEY\par  ddimer n \par 2019  v duplex n\par \par MARIA DE JESUS \par MARIA DE JESUS FVC 2.23 59% FEV1 0.9 33%FEV1% 40% \par \par A/R \par 2019 Cont Rx RTC 6 w \par 2019 Sent text to Kelly SCHMIDT to help arrange INOGEN requested by patient as his current portable tank lasts only 1 h and weighs less \par \par \par    \par                            \par \par \par   \par \par \par     \par \par \par \par      \par \par \par  \par \par

## 2019-07-31 NOTE — PHYSICAL EXAM
[General Appearance - Well Developed] : well developed [Normal Appearance] : normal appearance [General Appearance - Well Nourished] : well nourished [Well Groomed] : well groomed [General Appearance - In No Acute Distress] : no acute distress [Normal Conjunctiva] : the conjunctiva exhibited no abnormalities [No Deformities] : no deformities [Normal Oropharynx] : normal oropharynx [Eyelids - No Xanthelasma] : the eyelids demonstrated no xanthelasmas [Neck Appearance] : the appearance of the neck was normal [Neck Cervical Mass (___cm)] : no neck mass was observed [Thyroid Diffuse Enlargement] : the thyroid was not enlarged [Thyroid Nodule] : there were no palpable thyroid nodules [Jugular Venous Distention Increased] : there was no jugular-venous distention [Heart Rate And Rhythm] : heart rate and rhythm were normal [Heart Sounds] : normal S1 and S2 [Respiration, Rhythm And Depth] : normal respiratory rhythm and effort [Murmurs] : no murmurs present [Auscultation Breath Sounds / Voice Sounds] : lungs were clear to auscultation bilaterally [Exaggerated Use Of Accessory Muscles For Inspiration] : no accessory muscle use [Abdomen Soft] : soft [Abdomen Tenderness] : non-tender [Abdomen Mass (___ Cm)] : no abdominal mass palpated [Abnormal Walk] : normal gait [Gait - Sufficient For Exercise Testing] : the gait was sufficient for exercise testing [Cyanosis, Localized] : no localized cyanosis [Nail Clubbing] : no clubbing of the fingernails [Petechial Hemorrhages (___cm)] : no petechial hemorrhages [] : no ischemic changes [FreeTextEntry1] : is wearing oxygen during visit

## 2019-08-12 ENCOUNTER — INPATIENT (INPATIENT)
Facility: HOSPITAL | Age: 79
LOS: 1 days | Discharge: HOME | DRG: 189 | End: 2019-08-14
Attending: HOSPITALIST | Admitting: STUDENT IN AN ORGANIZED HEALTH CARE EDUCATION/TRAINING PROGRAM
Payer: COMMERCIAL

## 2019-08-12 VITALS
WEIGHT: 207.01 LBS | SYSTOLIC BLOOD PRESSURE: 182 MMHG | OXYGEN SATURATION: 100 % | HEIGHT: 74 IN | RESPIRATION RATE: 32 BRPM | HEART RATE: 124 BPM | DIASTOLIC BLOOD PRESSURE: 95 MMHG | TEMPERATURE: 97 F

## 2019-08-12 DIAGNOSIS — Z29.9 ENCOUNTER FOR PROPHYLACTIC MEASURES, UNSPECIFIED: ICD-10-CM

## 2019-08-12 DIAGNOSIS — E78.5 HYPERLIPIDEMIA, UNSPECIFIED: ICD-10-CM

## 2019-08-12 DIAGNOSIS — J44.1 CHRONIC OBSTRUCTIVE PULMONARY DISEASE WITH (ACUTE) EXACERBATION: ICD-10-CM

## 2019-08-12 DIAGNOSIS — E11.9 TYPE 2 DIABETES MELLITUS WITHOUT COMPLICATIONS: ICD-10-CM

## 2019-08-12 DIAGNOSIS — T14.8XXA OTHER INJURY OF UNSPECIFIED BODY REGION, INITIAL ENCOUNTER: Chronic | ICD-10-CM

## 2019-08-12 DIAGNOSIS — I25.10 ATHEROSCLEROTIC HEART DISEASE OF NATIVE CORONARY ARTERY WITHOUT ANGINA PECTORIS: ICD-10-CM

## 2019-08-12 DIAGNOSIS — J96.02 ACUTE RESPIRATORY FAILURE WITH HYPERCAPNIA: ICD-10-CM

## 2019-08-12 DIAGNOSIS — I10 ESSENTIAL (PRIMARY) HYPERTENSION: ICD-10-CM

## 2019-08-12 LAB
ALBUMIN SERPL ELPH-MCNC: 4 G/DL — SIGNIFICANT CHANGE UP (ref 3.3–5)
ALP SERPL-CCNC: 103 U/L — SIGNIFICANT CHANGE UP (ref 40–120)
ALT FLD-CCNC: 37 U/L — SIGNIFICANT CHANGE UP (ref 12–78)
ANION GAP SERPL CALC-SCNC: 6 MMOL/L — SIGNIFICANT CHANGE UP (ref 5–17)
AST SERPL-CCNC: 15 U/L — SIGNIFICANT CHANGE UP (ref 15–37)
BASE EXCESS BLDA CALC-SCNC: 1.3 MMOL/L — SIGNIFICANT CHANGE UP (ref -2–2)
BASE EXCESS BLDA CALC-SCNC: 1.3 MMOL/L — SIGNIFICANT CHANGE UP (ref -2–2)
BASE EXCESS BLDA CALC-SCNC: 1.8 MMOL/L — SIGNIFICANT CHANGE UP (ref -2–2)
BASOPHILS # BLD AUTO: 0.06 K/UL — SIGNIFICANT CHANGE UP (ref 0–0.2)
BASOPHILS NFR BLD AUTO: 0.4 % — SIGNIFICANT CHANGE UP (ref 0–2)
BILIRUB SERPL-MCNC: 0.3 MG/DL — SIGNIFICANT CHANGE UP (ref 0.2–1.2)
BLOOD GAS COMMENTS ARTERIAL: SIGNIFICANT CHANGE UP
BUN SERPL-MCNC: 19 MG/DL — SIGNIFICANT CHANGE UP (ref 7–23)
CALCIUM SERPL-MCNC: 10 MG/DL — SIGNIFICANT CHANGE UP (ref 8.5–10.1)
CHLORIDE SERPL-SCNC: 106 MMOL/L — SIGNIFICANT CHANGE UP (ref 96–108)
CO2 SERPL-SCNC: 30 MMOL/L — SIGNIFICANT CHANGE UP (ref 22–31)
CREAT SERPL-MCNC: 1.2 MG/DL — SIGNIFICANT CHANGE UP (ref 0.5–1.3)
EOSINOPHIL # BLD AUTO: 0.56 K/UL — HIGH (ref 0–0.5)
EOSINOPHIL NFR BLD AUTO: 3.6 % — SIGNIFICANT CHANGE UP (ref 0–6)
FLU A RESULT: SIGNIFICANT CHANGE UP
FLU A RESULT: SIGNIFICANT CHANGE UP
FLUAV AG NPH QL: SIGNIFICANT CHANGE UP
FLUBV AG NPH QL: SIGNIFICANT CHANGE UP
GLUCOSE SERPL-MCNC: 182 MG/DL — HIGH (ref 70–99)
HCO3 BLDA-SCNC: 24 MMOL/L — SIGNIFICANT CHANGE UP (ref 23–27)
HCT VFR BLD CALC: 45 % — SIGNIFICANT CHANGE UP (ref 39–50)
HGB BLD-MCNC: 13.9 G/DL — SIGNIFICANT CHANGE UP (ref 13–17)
HOROWITZ INDEX BLDA+IHG-RTO: 30 — SIGNIFICANT CHANGE UP
IMM GRANULOCYTES NFR BLD AUTO: 1 % — SIGNIFICANT CHANGE UP (ref 0–1.5)
LACTATE SERPL-SCNC: 0.9 MMOL/L — SIGNIFICANT CHANGE UP (ref 0.7–2)
LYMPHOCYTES # BLD AUTO: 18 % — SIGNIFICANT CHANGE UP (ref 13–44)
LYMPHOCYTES # BLD AUTO: 2.81 K/UL — SIGNIFICANT CHANGE UP (ref 1–3.3)
MCHC RBC-ENTMCNC: 28.2 PG — SIGNIFICANT CHANGE UP (ref 27–34)
MCHC RBC-ENTMCNC: 30.9 GM/DL — LOW (ref 32–36)
MCV RBC AUTO: 91.3 FL — SIGNIFICANT CHANGE UP (ref 80–100)
MONOCYTES # BLD AUTO: 1.32 K/UL — HIGH (ref 0–0.9)
MONOCYTES NFR BLD AUTO: 8.4 % — SIGNIFICANT CHANGE UP (ref 2–14)
NEUTROPHILS # BLD AUTO: 10.75 K/UL — HIGH (ref 1.8–7.4)
NEUTROPHILS NFR BLD AUTO: 68.6 % — SIGNIFICANT CHANGE UP (ref 43–77)
NRBC # BLD: 0 /100 WBCS — SIGNIFICANT CHANGE UP (ref 0–0)
PCO2 BLDA: 60 MMHG — HIGH (ref 32–46)
PCO2 BLDA: 72 MMHG — CRITICAL HIGH (ref 32–46)
PCO2 BLDA: 81 MMHG — CRITICAL HIGH (ref 32–46)
PH BLDA: 7.19 — CRITICAL LOW (ref 7.35–7.45)
PH BLDA: 7.22 — LOW (ref 7.35–7.45)
PH BLDA: 7.28 — LOW (ref 7.35–7.45)
PLATELET # BLD AUTO: 307 K/UL — SIGNIFICANT CHANGE UP (ref 150–400)
PO2 BLDA: 101 MMHG — SIGNIFICANT CHANGE UP (ref 74–108)
PO2 BLDA: 87 MMHG — SIGNIFICANT CHANGE UP (ref 74–108)
PO2 BLDA: 88 MMHG — SIGNIFICANT CHANGE UP (ref 74–108)
POTASSIUM SERPL-MCNC: 4.4 MMOL/L — SIGNIFICANT CHANGE UP (ref 3.5–5.3)
POTASSIUM SERPL-SCNC: 4.4 MMOL/L — SIGNIFICANT CHANGE UP (ref 3.5–5.3)
PROCALCITONIN SERPL-MCNC: <0.05 — SIGNIFICANT CHANGE UP (ref 0–0.04)
PROT SERPL-MCNC: 7.5 G/DL — SIGNIFICANT CHANGE UP (ref 6–8.3)
RBC # BLD: 4.93 M/UL — SIGNIFICANT CHANGE UP (ref 4.2–5.8)
RBC # FLD: 13.6 % — SIGNIFICANT CHANGE UP (ref 10.3–14.5)
RSV RESULT: SIGNIFICANT CHANGE UP
RSV RNA RESP QL NAA+PROBE: SIGNIFICANT CHANGE UP
SAO2 % BLDA: 94 % — SIGNIFICANT CHANGE UP (ref 92–96)
SAO2 % BLDA: 96 % — SIGNIFICANT CHANGE UP (ref 92–96)
SAO2 % BLDA: 96 % — SIGNIFICANT CHANGE UP (ref 92–96)
SODIUM SERPL-SCNC: 142 MMOL/L — SIGNIFICANT CHANGE UP (ref 135–145)
WBC # BLD: 15.65 K/UL — HIGH (ref 3.8–10.5)
WBC # FLD AUTO: 15.65 K/UL — HIGH (ref 3.8–10.5)

## 2019-08-12 PROCEDURE — 93970 EXTREMITY STUDY: CPT | Mod: 26

## 2019-08-12 PROCEDURE — 71045 X-RAY EXAM CHEST 1 VIEW: CPT | Mod: 26

## 2019-08-12 PROCEDURE — 12345: CPT | Mod: NC,GC

## 2019-08-12 PROCEDURE — 99291 CRITICAL CARE FIRST HOUR: CPT

## 2019-08-12 PROCEDURE — 93010 ELECTROCARDIOGRAM REPORT: CPT

## 2019-08-12 RX ORDER — TAMSULOSIN HYDROCHLORIDE 0.4 MG/1
0.4 CAPSULE ORAL AT BEDTIME
Refills: 0 | Status: DISCONTINUED | OUTPATIENT
Start: 2019-08-12 | End: 2019-08-12

## 2019-08-12 RX ORDER — INSULIN LISPRO 100/ML
VIAL (ML) SUBCUTANEOUS
Refills: 0 | Status: DISCONTINUED | OUTPATIENT
Start: 2019-08-12 | End: 2019-08-12

## 2019-08-12 RX ORDER — BUDESONIDE, MICRONIZED 100 %
0.5 POWDER (GRAM) MISCELLANEOUS
Refills: 0 | Status: DISCONTINUED | OUTPATIENT
Start: 2019-08-12 | End: 2019-08-14

## 2019-08-12 RX ORDER — SODIUM CHLORIDE 9 MG/ML
1000 INJECTION, SOLUTION INTRAVENOUS
Refills: 0 | Status: DISCONTINUED | OUTPATIENT
Start: 2019-08-12 | End: 2019-08-14

## 2019-08-12 RX ORDER — IPRATROPIUM/ALBUTEROL SULFATE 18-103MCG
3 AEROSOL WITH ADAPTER (GRAM) INHALATION ONCE
Refills: 0 | Status: COMPLETED | OUTPATIENT
Start: 2019-08-12 | End: 2019-08-12

## 2019-08-12 RX ORDER — DEXTROSE 50 % IN WATER 50 %
15 SYRINGE (ML) INTRAVENOUS ONCE
Refills: 0 | Status: DISCONTINUED | OUTPATIENT
Start: 2019-08-12 | End: 2019-08-14

## 2019-08-12 RX ORDER — IPRATROPIUM/ALBUTEROL SULFATE 18-103MCG
3 AEROSOL WITH ADAPTER (GRAM) INHALATION EVERY 6 HOURS
Refills: 0 | Status: DISCONTINUED | OUTPATIENT
Start: 2019-08-12 | End: 2019-08-14

## 2019-08-12 RX ORDER — CHLORHEXIDINE GLUCONATE 213 G/1000ML
1 SOLUTION TOPICAL
Refills: 0 | Status: DISCONTINUED | OUTPATIENT
Start: 2019-08-12 | End: 2019-08-14

## 2019-08-12 RX ORDER — ATORVASTATIN CALCIUM 80 MG/1
40 TABLET, FILM COATED ORAL AT BEDTIME
Refills: 0 | Status: DISCONTINUED | OUTPATIENT
Start: 2019-08-12 | End: 2019-08-14

## 2019-08-12 RX ORDER — DEXTROSE 50 % IN WATER 50 %
25 SYRINGE (ML) INTRAVENOUS ONCE
Refills: 0 | Status: DISCONTINUED | OUTPATIENT
Start: 2019-08-12 | End: 2019-08-14

## 2019-08-12 RX ORDER — ASPIRIN/CALCIUM CARB/MAGNESIUM 324 MG
81 TABLET ORAL DAILY
Refills: 0 | Status: DISCONTINUED | OUTPATIENT
Start: 2019-08-12 | End: 2019-08-14

## 2019-08-12 RX ORDER — DEXTROSE 50 % IN WATER 50 %
12.5 SYRINGE (ML) INTRAVENOUS ONCE
Refills: 0 | Status: DISCONTINUED | OUTPATIENT
Start: 2019-08-12 | End: 2019-08-14

## 2019-08-12 RX ORDER — GLUCAGON INJECTION, SOLUTION 0.5 MG/.1ML
1 INJECTION, SOLUTION SUBCUTANEOUS ONCE
Refills: 0 | Status: DISCONTINUED | OUTPATIENT
Start: 2019-08-12 | End: 2019-08-14

## 2019-08-12 RX ORDER — VALSARTAN 80 MG/1
80 TABLET ORAL DAILY
Refills: 0 | Status: DISCONTINUED | OUTPATIENT
Start: 2019-08-12 | End: 2019-08-14

## 2019-08-12 RX ORDER — TAMSULOSIN HYDROCHLORIDE 0.4 MG/1
0.4 CAPSULE ORAL AT BEDTIME
Refills: 0 | Status: DISCONTINUED | OUTPATIENT
Start: 2019-08-12 | End: 2019-08-14

## 2019-08-12 RX ORDER — INSULIN GLARGINE 100 [IU]/ML
20 INJECTION, SOLUTION SUBCUTANEOUS AT BEDTIME
Refills: 0 | Status: DISCONTINUED | OUTPATIENT
Start: 2019-08-12 | End: 2019-08-14

## 2019-08-12 RX ORDER — METOPROLOL TARTRATE 50 MG
12.5 TABLET ORAL
Refills: 0 | Status: DISCONTINUED | OUTPATIENT
Start: 2019-08-12 | End: 2019-08-14

## 2019-08-12 RX ORDER — CLOPIDOGREL BISULFATE 75 MG/1
75 TABLET, FILM COATED ORAL DAILY
Refills: 0 | Status: DISCONTINUED | OUTPATIENT
Start: 2019-08-12 | End: 2019-08-14

## 2019-08-12 RX ORDER — AZITHROMYCIN 500 MG/1
500 TABLET, FILM COATED ORAL DAILY
Refills: 0 | Status: DISCONTINUED | OUTPATIENT
Start: 2019-08-12 | End: 2019-08-14

## 2019-08-12 RX ORDER — INSULIN LISPRO 100/ML
VIAL (ML) SUBCUTANEOUS EVERY 6 HOURS
Refills: 0 | Status: DISCONTINUED | OUTPATIENT
Start: 2019-08-12 | End: 2019-08-12

## 2019-08-12 RX ORDER — INSULIN LISPRO 100/ML
VIAL (ML) SUBCUTANEOUS AT BEDTIME
Refills: 0 | Status: DISCONTINUED | OUTPATIENT
Start: 2019-08-12 | End: 2019-08-12

## 2019-08-12 RX ORDER — INSULIN LISPRO 100/ML
VIAL (ML) SUBCUTANEOUS
Refills: 0 | Status: DISCONTINUED | OUTPATIENT
Start: 2019-08-12 | End: 2019-08-14

## 2019-08-12 RX ORDER — SODIUM CHLORIDE 9 MG/ML
1000 INJECTION INTRAMUSCULAR; INTRAVENOUS; SUBCUTANEOUS ONCE
Refills: 0 | Status: COMPLETED | OUTPATIENT
Start: 2019-08-12 | End: 2019-08-12

## 2019-08-12 RX ORDER — ENOXAPARIN SODIUM 100 MG/ML
40 INJECTION SUBCUTANEOUS DAILY
Refills: 0 | Status: DISCONTINUED | OUTPATIENT
Start: 2019-08-12 | End: 2019-08-14

## 2019-08-12 RX ADMIN — Medication 0.5 MILLIGRAM(S): at 07:19

## 2019-08-12 RX ADMIN — Medication 3 MILLILITER(S): at 04:53

## 2019-08-12 RX ADMIN — SODIUM CHLORIDE 1000 MILLILITER(S): 9 INJECTION INTRAMUSCULAR; INTRAVENOUS; SUBCUTANEOUS at 07:10

## 2019-08-12 RX ADMIN — CLOPIDOGREL BISULFATE 75 MILLIGRAM(S): 75 TABLET, FILM COATED ORAL at 12:38

## 2019-08-12 RX ADMIN — Medication 6: at 22:21

## 2019-08-12 RX ADMIN — Medication 40 MILLIGRAM(S): at 14:09

## 2019-08-12 RX ADMIN — TAMSULOSIN HYDROCHLORIDE 0.4 MILLIGRAM(S): 0.4 CAPSULE ORAL at 22:20

## 2019-08-12 RX ADMIN — Medication 4: at 17:57

## 2019-08-12 RX ADMIN — Medication 125 MILLIGRAM(S): at 04:53

## 2019-08-12 RX ADMIN — VALSARTAN 80 MILLIGRAM(S): 80 TABLET ORAL at 12:46

## 2019-08-12 RX ADMIN — Medication 40 MILLIGRAM(S): at 22:19

## 2019-08-12 RX ADMIN — ENOXAPARIN SODIUM 40 MILLIGRAM(S): 100 INJECTION SUBCUTANEOUS at 12:38

## 2019-08-12 RX ADMIN — INSULIN GLARGINE 20 UNIT(S): 100 INJECTION, SOLUTION SUBCUTANEOUS at 22:21

## 2019-08-12 RX ADMIN — CHLORHEXIDINE GLUCONATE 1 APPLICATION(S): 213 SOLUTION TOPICAL at 12:05

## 2019-08-12 RX ADMIN — Medication 3 MILLILITER(S): at 20:38

## 2019-08-12 RX ADMIN — Medication 12.5 MILLIGRAM(S): at 17:55

## 2019-08-12 RX ADMIN — AZITHROMYCIN 500 MILLIGRAM(S): 500 TABLET, FILM COATED ORAL at 12:38

## 2019-08-12 RX ADMIN — Medication 0.5 MILLIGRAM(S): at 20:38

## 2019-08-12 RX ADMIN — ATORVASTATIN CALCIUM 40 MILLIGRAM(S): 80 TABLET, FILM COATED ORAL at 22:20

## 2019-08-12 RX ADMIN — Medication 3 MILLILITER(S): at 13:21

## 2019-08-12 RX ADMIN — Medication 81 MILLIGRAM(S): at 12:38

## 2019-08-12 RX ADMIN — Medication 4: at 12:02

## 2019-08-12 RX ADMIN — Medication 1 TABLET(S): at 12:38

## 2019-08-12 RX ADMIN — Medication 3 MILLILITER(S): at 07:18

## 2019-08-12 NOTE — H&P ADULT - PROBLEM SELECTOR PLAN 2
- Patient currently on bipap and home oxygen 2L   - Patient received   - Pt on pulmokort and albuterol   - Will monitor repeat ABG'  - F/u procalcitonin  - F/u with official chest X-ray - Patient currently on bipap and home oxygen 2L  - Patient received duonebs and solumedrol 125mg in ED  - continue with duonebs q6, pulmicort and solumedrol  - Will monitor repeat ABG'  - Leukocytosis likely from steroids; F/u procalcitonin  - will defer need for antibiotics to pulmonary  - F/u with official chest X-ray (no focal consolidation on personal read)

## 2019-08-12 NOTE — ED ADULT NURSE NOTE - NSIMPLEMENTINTERV_GEN_ALL_ED
Implemented All Universal Safety Interventions:  [place] to call system. Call bell, personal items and telephone within reach. Instruct patient to call for assistance. Room bathroom lighting operational. Non-slip footwear when patient is off stretcher. Physically safe environment: no spills, clutter or unnecessary equipment. Stretcher in lowest position, wheels locked, appropriate side rails in place.

## 2019-08-12 NOTE — CONSULT NOTE ADULT - SUBJECTIVE AND OBJECTIVE BOX
Patient is a 79y old  Male who presents with a chief complaint of COPD exacerbation (12 Aug 2019 07:13)      BRIEF HOSPITAL COURSE: 78 y/o male with pmhx of HTN, COPD (on 2 L NC at home and bipap QHS), CAD s/p CABG x 3, HLD, DM II, cardiac arrest due to COPD exacerbation in June 2018 who presents to ED from home with worsening shortness of breath x 2 days associated with increasing oxygenation requirements. Patient has been admitted multiple times, about once a month for COPD exacerbations. He denies any recent sick contacts, headache, fevers, nausea/vomiting, chest pain, abdominal pain.    In the ED patient was given solumedrol, duonebs, and started on bipap 18/6. His initial ABG of 7.22/72/101/24 worsened to 7.19/81/88/24 after being on bipap for two hours. At time of my evaluation patient was satting 98% on 30% fio2 and taking tidal volumes of 500-570 on 18/6. He states his breathing is starting to feel better.       PAST MEDICAL & SURGICAL HISTORY:  PVD (peripheral vascular disease)  Hypertension  COPD (chronic obstructive pulmonary disease)  Osteomyelitis  Dyslipidemia  CAD (Coronary Artery Disease)  Diabetes Mellitus, Type II  Compound fracture: left leg  CABG (Coronary Artery Bypass Graft)    Allergies    No Known Allergies    Intolerances    shellfish (Nausea)    FAMILY HISTORY:  Family history of diabetes mellitus (Sibling)      Family history otherwise noncontributory.      Review of Systems:  CONSTITUTIONAL: No fever, chills, or fatigue  EYES: No eye pain, visual disturbances, or discharge  ENMT:  No difficulty hearing, tinnitus, vertigo; No sinus or throat pain  NECK: No pain or stiffness  RESPIRATORY: + wheezing, + shortness of breath  CARDIOVASCULAR: No chest pain, palpitations, dizziness, or leg swelling  GASTROINTESTINAL: No abdominal or epigastric pain. No nausea, vomiting, or hematemesis; No diarrhea or constipation. No melena or hematochezia.  GENITOURINARY: No dysuria, frequency, hematuria, or incontinence  NEUROLOGICAL: No headaches, memory loss, loss of strength, numbness, or tremors  SKIN: No itching, burning, rashes, or lesions   MUSCULOSKELETAL: No joint pain or swelling; No muscle, back, or extremity pain  PSYCHIATRIC: No depression, anxiety, mood swings, or difficulty sleeping    All other review of systems negative except as mentioned in HPI.      Social History: prior smoker 1 ppd quit 30 years ago. denies etoh abuse and/or illicit drug use. lives at home with wife, independent at baseline.      Medications:  azithromycin   Tablet 500 milliGRAM(s) Oral daily    metoprolol tartrate 12.5 milliGRAM(s) Oral two times a day  tamsulosin 0.4 milliGRAM(s) Oral at bedtime  valsartan 80 milliGRAM(s) Oral daily    ALBUTerol/ipratropium for Nebulization 3 milliLiter(s) Nebulizer every 6 hours  buDESOnide    Inhalation Suspension 0.5 milliGRAM(s) Inhalation two times a day        aspirin enteric coated 81 milliGRAM(s) Oral daily  clopidogrel Tablet 75 milliGRAM(s) Oral daily  enoxaparin Injectable 40 milliGRAM(s) SubCutaneous daily        atorvastatin 40 milliGRAM(s) Oral at bedtime  dextrose 40% Gel 15 Gram(s) Oral once PRN  dextrose 50% Injectable 12.5 Gram(s) IV Push once  dextrose 50% Injectable 25 Gram(s) IV Push once  dextrose 50% Injectable 25 Gram(s) IV Push once  glucagon  Injectable 1 milliGRAM(s) IntraMuscular once PRN  insulin lispro (HumaLOG) corrective regimen sliding scale   SubCutaneous every 6 hours  methylPREDNISolone sodium succinate Injectable 40 milliGRAM(s) IV Push every 8 hours    dextrose 5%. 1000 milliLiter(s) IV Continuous <Continuous>  multivitamin 1 Tablet(s) Oral daily      chlorhexidine 2% Cloths 1 Application(s) Topical <User Schedule>            ICU Vital Signs Last 24 Hrs  T(C): 36.5 (12 Aug 2019 08:13), Max: 36.5 (12 Aug 2019 08:13)  T(F): 97.7 (12 Aug 2019 08:13), Max: 97.7 (12 Aug 2019 08:13)  HR: 111 (12 Aug 2019 08:08) (111 - 124)  BP: 132/73 (12 Aug 2019 08:08) (132/73 - 182/95)  BP(mean): --  ABP: --  ABP(mean): --  RR: 25 (12 Aug 2019 08:08) (16 - 32)  SpO2: 99% (12 Aug 2019 08:08) (95% - 100%)    Vital Signs Last 24 Hrs  T(C): 36.5 (12 Aug 2019 08:13), Max: 36.5 (12 Aug 2019 08:13)  T(F): 97.7 (12 Aug 2019 08:13), Max: 97.7 (12 Aug 2019 08:13)  HR: 111 (12 Aug 2019 08:08) (111 - 124)  BP: 132/73 (12 Aug 2019 08:08) (132/73 - 182/95)  BP(mean): --  RR: 25 (12 Aug 2019 08:08) (16 - 32)  SpO2: 99% (12 Aug 2019 08:08) (95% - 100%)    ABG - ( 12 Aug 2019 07:37 )  pH, Arterial: 7.19  pH, Blood: x     /  pCO2: 81    /  pO2: 88    / HCO3: 24    / Base Excess: 1.8   /  SaO2: 94          I&O's Detail      LABS:                        13.9   15.65 )-----------( 307      ( 12 Aug 2019 04:34 )             45.0     08-12    142  |  106  |  19  ----------------------------<  182<H>  4.4   |  30  |  1.20    Ca    10.0      12 Aug 2019 04:34    TPro  7.5  /  Alb  4.0  /  TBili  0.3  /  DBili  x   /  AST  15  /  ALT  37  /  AlkPhos  103  08-12      CAPILLARY BLOOD GLUCOSE      POCT Blood Glucose.: 193 mg/dL (12 Aug 2019 08:08)        CULTURES:      Physical Examination:    GENERAL: No acute distress.      EYES: Pupils equal, reactive to light.  Symmetric.    EARS, NOSE, THROAT: Normal; supple neck, no lymphadenopathy; trachea midline    PULM: tight breath sounds throughout with expiratory wheezing. no significant sputum production. No use of accessory respiratory muscles.    CVS: Regular rate and rhythm, no murmurs, rubs, or gallops    GI: Soft, nondistended, nontender, normoactive bowel sounds, no masses, no guarding    EXTREMITIES: No edema. DP and radial pulses 2+ bilaterally.    SKIN: Warm and well perfused, no rashes noted.    NEURO: Alert, oriented, interactive, nonfocal    DEVICES: bipap    RADIOLOGY:     EXAM:  XR CHEST AP OR PA 1V                            PROCEDURE DATE:  08/12/2019          INTERPRETATION:  Short of breath.    AP chest. Prior 7/22/2019.    Status post median sternotomy. Normal heart mediastinum. Symmetrically   hyperinflated lungs. No consolidation or effusion.    Impression:    Hyperinflated lungs. No active infiltrates.                RODRIGO FAITH M.D., ATTENDING RADIOLOGIST  This document has been electronically signed. Aug 12 2019  8:24AM          CRITICAL CARE TIME SPENT: 36 minutes of critical care time spent providing medical care for patient's acute illness/conditions that impairs at least one vital organ system and/or poses a high risk of imminent or life threatening deterioration in the patient's condition. It includes time spent evaluating and treating the patient's acute illness as well as time spent reviewing labs, radiology, discussing goals of care with patient and/or patient's family, and discussing the case with a multidisciplinary team in an effort to prevent further life threatening deterioration or end organ damage. This time is independent of any procedures performed.

## 2019-08-12 NOTE — H&P ADULT - NSHPREVIEWOFSYSTEMS_GEN_ALL_CORE
CONSTITUTIONAL: denies fever, chills, fatigue, weakness  HEENT: denies blurred vision, sore throat  SKIN: denies new lesions, rash  CARDIOVASCULAR: denies chest pain, chest pressure, palpitations  RESPIRATORY: denies shortness of breath, sputum production  GASTROINTESTINAL: denies nausea, vomiting, diarrhea, abdominal pain  GENITOURINARY: denies dysuria, discharge  NEUROLOGICAL: denies numbness, headache, focal weakness  MUSCULOSKELETAL: denies new joint pain, muscle aches  HEMATOLOGIC: denies gross bleeding, bruising  LYMPHATICS: denies enlarged lymph nodes, extremity swelling  PSYCHIATRIC: denies recent changes in anxiety, depression  ENDOCRINOLOGIC: denies sweating, cold or heat intolerance CONSTITUTIONAL: denies fever, chills, fatigue, weakness  HEENT: denies blurred vision, sore throat  SKIN: denies new lesions, rash  CARDIOVASCULAR: denies chest pain, chest pressure, palpitations  RESPIRATORY: + shortness of breath, denies sputum production  GASTROINTESTINAL: denies nausea, vomiting, diarrhea, abdominal pain  GENITOURINARY: denies dysuria, discharge  NEUROLOGICAL: denies numbness, headache, focal weakness  MUSCULOSKELETAL: denies new joint pain, muscle aches  HEMATOLOGIC: denies gross bleeding, bruising  LYMPHATICS: + extremity swelling  PSYCHIATRIC: denies recent changes in anxiety, depression  ENDOCRINOLOGIC: denies sweating, cold or heat intolerance

## 2019-08-12 NOTE — H&P ADULT - NSHPLABSRESULTS_GEN_ALL_CORE
CXR: hyperinflated lungs, flattened diaphragm, no focal consolidation (personally reviewed)  EKG: sinus tachycardia (personally reviewed)

## 2019-08-12 NOTE — ED ADULT NURSE NOTE - OBJECTIVE STATEMENT
SOB since 2300 tonight.  Wears home O2, and C-PAP.   Hx COPD  arrived tachypneic and Tachycardic,  Clammy.  RR 30's.  BIPap initiated immediately by RT.  Duonebs/steroids given.

## 2019-08-12 NOTE — H&P ADULT - PROBLEM SELECTOR PLAN 1
-admit to tele  -ABG showed hypercapnic respiratory failure on BiPAP with respiratory acidosis, will continue BiPAP for now  -NPO at this time as patient is on BiPAP  -s/p 125mg solumedrol and duoneb x1 in ED, will continue on 40mg q8h. Continue duonebs q6h standing, and elevated head of bed  -f/u procalcitonin level though doubt infectious cause at this time, leukocytosis likely secondary to steroid usage  -repeat ABG in the morning (8am) and will consider weaning   -pulDr. Oleg rain, consulted, f/u recs -admit to tele  -ABG with respiratory acidosis, will continue BiPAP for now and repeat ABG  -NPO at this time as patient is on BiPAP  -s/p 125mg solumedrol and duoneb x1 in ED, will continue on 40mg q8h. Continue duonebs q6h standing, and elevated head of bed  -f/u procalcitonin level though doubt infectious cause at this time, leukocytosis likely secondary to steroid usage  -repeat ABG in the morning (8am) and will consider weaning   -pulDr. Oleg rain, consulted, f/u recs

## 2019-08-12 NOTE — H&P ADULT - ASSESSMENT
79 year old M with PMH HTN, COPD (On home O2 2L and BIPAP at while sleeping regardless of time of day), CAD w/ 3v CABG, HLD, DM2 (on Metformin), hx Hypercapnic Resp Failure/Cardiac Arrest requiring intubation (6/18) presented with worsening shortness of breath, admitted for COPD exacerbation.

## 2019-08-12 NOTE — CONSULT NOTE ADULT - ATTENDING COMMENTS
Patient assessed independently from above    80 y/o male with hx COPD (on home O2 and qhs BiPAP), HTN, HLD, CAD s/p CABG x 3, DM II, cardiac arrest, admitted with acute hypercapnic respiratory failure due to COPD exacerbation, unknown trigger.     ABG initially worsened on BiPAP but subsequently improved     Recs:   Neuro - AAOx3, follows commands   CV - HD stable, continue aspirin, Plavix, metoprolol, valsartan, atorvastatin  Resp - Continue BiPAP 18/6, decrease FiO2 to maintain SpO2 88-94%, continue Solumedrol 40mg q8, azithromycin, budesonide, DuoNebs  GI - NPO   Renal - fn stable  ID - f/u RVP, flu panel neg, no suspicion for bacterial infection at present   Endo - Humalog sliding scale   PPx - Lovenox   Prognosis guarded     Patient critically ill, 44mins spent

## 2019-08-12 NOTE — CONSULT NOTE ADULT - ASSESSMENT
80 y/o male with pmhx of HTN, COPD (on 2 L NC at home and bipap QHS), CAD s/p CABG x 3, HLD, DM II, cardiac arrest due to COPD exacerbation in June 2018 now with acute severe COPD exacerbation. ABG not improving on bipap, admit to ICU.      NEURO: no active issues.  CVS: aspirin, plavix. lopressor and ace. continue statin.  PULM: IPAP increased to 20. recheck ABG in 2 hours. if patients acidosis continues to worsen may require intubation. titrate fio2 for spo2 88-94. avoid hyperoxia in this patient. solumedrol, duonebs q6 hours. azithromycin.   GI: npo except meds for now due to tenuous respiratory status.  RENAL: monitor lytes.   ID: on azithromycin for anti inflammatory. WBC likely reactive. afebrile and procalcitonin < 0.05. cultures pending. check rvp.  ENDO: q 6 hours accucheck with sliding coverage while npo. check hgb a1c. start long acting insulin when diet started.  HEME: lovenox for DVT prophylaxis.    Discussed with ICU Dr. Hodges. 78 y/o male with pmhx of HTN, COPD (on 2 L NC at home and bipap QHS), CAD s/p CABG x 3, HLD, DM II, cardiac arrest due to COPD exacerbation in June 2018 now with acute severe COPD exacerbation. ABG not improving on bipap, admit to ICU.      NEURO: no active issues.  CVS: aspirin, plavix. lopressor and ace. continue statin.  PULM: IPAP increased to 20. recheck ABG in 2 hours. if patients acidosis continues to worsen may require intubation. titrate fio2 for spo2 88-94. avoid hyperoxia in this patient. solumedrol, duonebs q6 hours. azithromycin. continue pulmicort  GI: npo except meds for now due to tenuous respiratory status.  RENAL: monitor lytes.   ID: on azithromycin for anti inflammatory. WBC likely reactive. afebrile and procalcitonin < 0.05. cultures pending. check rvp.  ENDO: q 6 hours accucheck with sliding coverage while npo. check hgb a1c. start long acting insulin when diet started.  HEME: lovenox for DVT prophylaxis.    Discussed with ICU Dr. Hodges. 80 y/o male with pmhx of HTN, COPD (on 2 L NC at home and bipap QHS), CAD s/p CABG x 3, HLD, DM II, cardiac arrest due to COPD exacerbation in June 2018 now with acute hypercapnic respiratory failure due to severe COPD exacerbation. ABG not improving on bipap, admit to ICU.      NEURO: no active issues.  CVS: aspirin, plavix. lopressor and ace. continue statin.  PULM: IPAP increased to 20. recheck ABG in 2 hours. if patients acidosis continues to worsen may require intubation. titrate fio2 for spo2 88-94. avoid hyperoxia in this patient. solumedrol, duonebs q6 hours. azithromycin. continue pulmicort  GI: npo except meds for now due to tenuous respiratory status.  RENAL: monitor lytes.   ID: on azithromycin for anti inflammatory. WBC likely reactive. afebrile and procalcitonin < 0.05. cultures pending. check rvp.  ENDO: q 6 hours accucheck with sliding coverage while npo. check hgb a1c. start long acting insulin when diet started.  HEME: lovenox for DVT prophylaxis.    Discussed with ICU Dr. Hodges.

## 2019-08-12 NOTE — ED ADULT NURSE REASSESSMENT NOTE - NS ED NURSE REASSESS COMMENT FT1
remains tachy despite N/S bolus although states he feels much better with C-Pap.
Pt received in report from RACHEL Chavez alert and oriented and resting in bed with symptoms of SOB on BIPAP. Setting are as follows 18/6/30% and tolerating well. Pt now being evaluated by ICU and is accepted to [facility]. Pt now awaiting ICU bed. Nursing care ongoing and safety maintained.

## 2019-08-12 NOTE — H&P ADULT - PROBLEM SELECTOR PLAN 4
Chronic, stable  -Continue home meds -metoprolol tartrate 25mg q12h with hold parameters  -Home med valsartan not available at present, losartan 50mg qd given instead with hold parameters Chronic, stable  -Continue home meds -metoprolol tartrate 12.5mg q12h and valsartan 80mg daily with hold parameters  -monitor routine hemodynamics

## 2019-08-12 NOTE — CONSULT NOTE ADULT - ASSESSMENT
PATIENT  COMMODORE YUE [facility] P [phone]  1940 DOA 2019                          Patient description                          79 m former smoker PMH HTN, DM2 (on home Metformin and glipizide), COPD (2L home/ BIPAP at night), CAD with 3v CABG , HLD, 10/26/2018 ECHO ef 55% hx hypercapnic resp failure with ho intubation c/b with cardiac arrest (2018) with ho recurrent admissions GOLD D admitted 2019 with progressive sob No sick contacts No fever No travel    PATIENT COMMODORE YUE [facility] P [phone]  1940 DOA 2019                         Home meds included  metorpolol 25.2 valsartan 80 plavx 75 asa breo 100 ventiolin incruse metformin 1000.2          Previous visits   -2019 [facility]   -2019 [facility]   -2019 [facility]   -2019 [facility]   -2019  [facility]  3/11-3/14/2019 [facility]   -2019 [facility]   2/ [facility]   -2019 [facility]                                                  PATIENT  COMMODORE YUE [facility] P [phone]  1940 DOA 2019                            PROBLEM/ASSESSMENT/RECOMMENDATIONS (A/R)       AOC HYPERCAPNIC RESP FAILURE   2019 10a 18/6/.3 728/60/87   2019 7a bpap 18/6/.3 719/81/88  2019 5a  bpap 18/6/.3 722/72/101   A/R Gas exchange improved with bpap following his usual post-hospitalization course seen previously   Permissive hypercapnia strategy to avoid autopeep     COPD ex    COPD (2L home/ BIPAP at night)   Has COPD GOLD D with ac exacerbation   A/R On BD azithro steroids  Taper steroids Cont Rx      RESP TRACT INFECTION   2019 pc n  2019 flu ab n rsv n    A/R Bacterial infection unlikely    RO VTE   2019 V duplex n  A/R VTE less likely    RO MI   PAST H CAD with 3v CABG 2004  10/26/2018 ECHO ef 55%   A/R No obvious ongoing ischemia         TIME SPENT Over 55 minutes aggregate care time spent on encounter; activities included   direct pt care, counseling and/or coordinating care reviewing notes, lab data/ imaging , discussion with multidisciplinary team/ pt /family. Risks, benefits, alternatives  discussed in detail.

## 2019-08-12 NOTE — H&P ADULT - PROBLEM SELECTOR PLAN 3
Chronic  -Home meds include metformin and glipizide  -Will place patient on moderate dose insulin sliding scale (considering patient will be on steroids), accuchecks, and hypoglycemia protocol Chronic  -Home meds include metformin and glipizide  -Will place patient on moderate dose insulin sliding scale (considering patient will be on steroids), accuchecks, and hypoglycemia protocol  -monitor blood glucose while on systemic steroids

## 2019-08-12 NOTE — CHART NOTE - NSCHARTNOTEFT_GEN_A_CORE
Pt seen and evaluated at bedside in the ED this morning. Reviewed admission H+P. Reviewed pulm notes. Discussed w/ pt and family the ABG findings this morning. I recommended MICU eval and they approached bedside during my encounter w/ the pt and family. Recommended adjusting bipap settings and upgrade to MICU appropriate. Pt was monitored in MICU this morning. Serial ABG's demonstrated improvement in clinical condition. Pt to remain in MICU for now. Hospitalist team will follow.

## 2019-08-12 NOTE — H&P ADULT - HISTORY OF PRESENT ILLNESS
79 year old M with PMH HTN, COPD (On home O2 2L and BIPAP at while sleeping regardless of time of day), CAD w/ 3v CABG, HLD, DM2 (on Metformin), hx Hypercapnic Resp Failure/Cardiac Arrest requiring intubation (6/18)    Of note, patient recently admitted on 7/22 at [facility] for COPD exacerbation.    In the ED, T 96.9, , /95 --> 146/83, RR 32 SpO2 100% on 6L NC then placed on BiPAP.  Labs significant for WBC 15.65, POCT 169, proBNP 134, ABG showed pH 7.22 pCO2 72 on BiPAP.  Patient received solumedrol 125mg IV x1 and duoneb x1 in the ED.    EKG:  CXR: hyperinflated lungs, flattened diaphragm, no acute lung pathology (wet read) 79 year old M with PMH HTN, COPD (On home O2 2L and BIPAP at while sleeping regardless of time of day), CAD w/ 3v CABG, HLD, DM2 (on Metformin), 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 Per his wife, the patient was feeling well up until this afternoon when he started to feel SOB. His oxygen saturation continued to worsen throughout the day without any improvement following the Bipap. He denies any fever, chills, chest pain. abdominal pain and. He denies any sick contacts He is currently compliant with all his home meds.     Of note, patient recently admitted on 7/22 at [facility] for COPD exacerbation.    In the ED, T 96.9, , /95 --> 146/83, RR 32 SpO2 100% on 6L NC then placed on BiPAP.  Labs significant for WBC 15.65, POCT 169, proBNP 134, ABG showed pH 7.22 pCO2 72 on BiPAP.  Patient received solumedrol 125mg IV x1 and duoneb x1 in the ED.    EKG:  CXR: hyperinflated lungs, flattened diaphragm, no acute lung pathology (wet read) 79 year old M with PMH HTN, COPD (On home O2 2L and BIPAP at while sleeping regardless of time of day), CAD w/ 3v CABG, HLD, DM2 (on Metformin), 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 Per his wife, the patient was feeling well up until this afternoon when he started to feel SOB. His oxygen saturation continued to worsen throughout the day without any improvement following the Bipap. He denies any fever, chills, chest pain. abdominal pain and. He denies any sick contacts He is currently compliant with all his home meds. Patient states he feels much improved in the ED on BIPAP.     Of note, patient recently admitted on 7/22 at [facility] for COPD exacerbation.    In the ED, T 96.9, , /95 --> 146/83, RR 32 SpO2 100% on 6L NC then placed on BiPAP.  Labs significant for WBC 15.65, POCT 169, proBNP 134, ABG showed pH 7.22 pCO2 72 on BiPAP.  Patient received solumedrol 125mg IV x1 and duoneb x1 in the ED.    EKG: sinus tachycardia (personally reviewed)  CXR: hyperinflated lungs, flattened diaphragm, no focal consolidation (personally reviewed)

## 2019-08-12 NOTE — H&P ADULT - NSHPOUTPATIENTPROVIDERS_GEN_ALL_CORE
PCP: Dr. Sarah Avila  Cardio: Dr. Rod  Pulm: Dr. Dejesus  Pharmacy: [facility]

## 2019-08-12 NOTE — H&P ADULT - ATTENDING COMMENTS
pt was critically ill with risk of abrupt decompensation  ABG with acute hypercapnic respiratory failure with respiratory acidosis, now clinically improved with BIPAP  will continue with BIPAP and reassess ABG, low threshold for ICU consult - pt has been intubated in the past pt was critically ill with risk of abrupt decompensation  ABG with acute hypercapnic respiratory failure with respiratory acidosis, now clinically improved with BIPAP  will continue with BIPAP and reassess ABG, low threshold for ICU consult - pt has been intubated in the past  bronchodilators, IV steroids, repeat ABG  f/u procalcitonin, monitor for fevers - assess need for empiric abx    Critical care time spent: 35 min    Now stabilized for telemetry - monitor closely for respiratory decompensation  Pulm Consult Dr. Dejesus

## 2019-08-12 NOTE — ED PROVIDER NOTE - OBJECTIVE STATEMENT
78yo male bib ems with sob tonite. wife states pt has been up all nite sob, gave himself a neb at home with no relief, no cough, fever, chills, pt has bipap at home and used it with no relief, no other comaplints

## 2019-08-12 NOTE — ED PROVIDER NOTE - CRITICAL CARE PROVIDED
documentation/consult w/ pt's family directly relating to pts condition/direct patient care (not related to procedure)/interpretation of diagnostic studies/additional history taking

## 2019-08-12 NOTE — H&P ADULT - PROBLEM SELECTOR PLAN 7
IMPROVE VTE Individual Risk Assessment  RISK                                                                Points  [  ] Previous VTE                                                  3  [  ] Thrombophilia                                               2  [  ] Lower limb paralysis                                      2        (unable to hold up >15 seconds)    [  ] Current Cancer                                              2         (within 6 months)  [  ] Immobilization > 24 hrs                                1  [  ] ICU/CCU stay > 24 hours                              1  [  ] Age > 60                                                      1  IMPROVE VTE Score ____1_____    IMPROVE Score 0-1: Low Risk, No VTE prophylaxis required for most patients, encourage ambulation.   IMPROVE Score 2-3: At risk, pharmacologic VTE prophylaxis is indicated for most patients (in the absence of a contraindication)  IMPROVE Score > or = 4: High Risk, pharmacologic VTE prophylaxis is indicated for most patients (in the absence of a contraindication) DVT prophylaxis with lovenox    IMPROVE VTE Individual Risk Assessment  RISK                                                                Points  [  ] Previous VTE                                                  3  [  ] Thrombophilia                                               2  [  ] Lower limb paralysis                                      2        (unable to hold up >15 seconds)    [  ] Current Cancer                                              2         (within 6 months)  [  ] Immobilization > 24 hrs                                1  [  ] ICU/CCU stay > 24 hours                              1  [  ] Age > 60                                                      1  IMPROVE VTE Score ____1_____    IMPROVE Score 0-1: Low Risk, No VTE prophylaxis required for most patients, encourage ambulation.   IMPROVE Score 2-3: At risk, pharmacologic VTE prophylaxis is indicated for most patients (in the absence of a contraindication)  IMPROVE Score > or = 4: High Risk, pharmacologic VTE prophylaxis is indicated for most patients (in the absence of a contraindication) DVT prophylaxis with lovenox    IMPROVE VTE Individual Risk Assessment  RISK                                                                Points  [  ] Previous VTE                                                  3  [  ] Thrombophilia                                               2  [  ] Lower limb paralysis                                      2        (unable to hold up >15 seconds)    [  ] Current Cancer                                              2         (within 6 months)  [ x ] Immobilization > 24 hrs (expected while on bipap)      1  [  ] ICU/CCU stay > 24 hours                              1  [ x ] Age > 60                                                      1  IMPROVE VTE Score ____2_____    IMPROVE Score 0-1: Low Risk, No VTE prophylaxis required for most patients, encourage ambulation.   IMPROVE Score 2-3: At risk, pharmacologic VTE prophylaxis is indicated for most patients (in the absence of a contraindication)  IMPROVE Score > or = 4: High Risk, pharmacologic VTE prophylaxis is indicated for most patients (in the absence of a contraindication)

## 2019-08-12 NOTE — CONSULT NOTE ADULT - SUBJECTIVE AND OBJECTIVE BOX
Patient was examined Chart reviewed Detailed note will be inserted below after going over latest data Meanwhile please refer to my previous notes for Pulmonary/Critical Care assessment recommendations COMMODORE TURNER [facility] P [phone]  1940 DOA 2019  ALLERGY Shellfish  CONTACT Oumar EATON      Initial evaluation/Pulmonary Critical Care consultation requested on  2019  by Dr Staton   from Dr Dejesus   Patient examined chart reviewed    HOSPITAL ADMISSION   PATIENT CAME  FROM (if information available)      PATIENT COMMODORE TURNER [facility] P [phone]  1940 DOA 2019                          PT DESCRIPTION       This section was excerpted from ER md note but was independently verified by me    · Chief Complaint: The patient is a 79y Male complaining of shortness of breath.  · HPI Objective Statement: 80yo male bib ems with sob tonite. wife states pt has been up all nite sob, gave himself a neb at home with no relief, no cough, fever, chills, pt has bipap at home and used it with no relief, no other comaplints  · Presenting Symptoms: DIFFICULTY BREATHING  · Negative Findings: no edema, no fever, no headache, no hemoptysis  · Timing: gradual onset, constant  · Duration: today  · Quality: tightness, wheezing  · Context: unknown  · Aggravated Factors: breathing deeply, coughing  · Relieving Factors: none    PAST MEDICAL/SURGICAL/FAMILY/SOCIAL HISTORY:    Past Medical History:  CAD (Coronary Artery Disease)    COPD (chronic obstructive pulmonary disease)    Diabetes Mellitus, Type II    Dyslipidemia    Hypertension    Osteomyelitis    PVD (peripheral vascular disease).     Past Surgical History:  CABG (Coronary Artery Bypass Graft)    Compound fracture  left leg.     Tobacco Usage:  · Tobacco Usage Former smoker    ALLERGIES AND HOME MEDICATIONS:   Allergies:        Allergies:  No Known Allergies:        Intolerances:  shellfish: Food, Nausea, feels nauseous when eating crabs or shrimp but no true allergic reaction    Home Medications:   * Patient Currently Takes Medications as of 2019 14:41 documented in Structured Notes  · Metoprolol Tartrate 25 mg oral tablet: 0.5 tab(s) orally 2 times a day   · glipiZIDE 5 mg oral tablet: 1 tab(s) orally 2 times a day  - pt is on oral steriod if bs still run in home above 200 please increase glipizide dose to 5 mg po bid and fu your pmd.   · predniSONE 10 mg oral tablet: Take 4 pills for 2 days  Take 3 pills for 2 days  Take 2 pills for 2 days   Take 1 pill for 3 days       · azithromycin 500 mg oral tablet: 1 tab(s) orally once a day  · valsartan 80 mg oral tablet: 1 tab(s) orally once a day  · Multiple Vitamins oral tablet: 1 tab(s) orally once a day  · clopidogrel 75 mg oral tablet: 1 tab(s) orally once a day  · aspirin 81 mg oral delayed release tablet: 1 tab(s) orally once a day  · atorvastatin 40 mg oral tablet: 1 tab(s) orally once a day  · tamsulosin 0.4 mg oral capsule: 1 cap(s) orally once a day (at bedtime)  · Ventolin HFA 90 mcg/inh inhalation aerosol: 2 puff(s) inhaled 4 times a day  · Breo Ellipta 100 mcg-25 mcg/inh inhalation powder: 1 puff(s) inhaled once a day  · metFORMIN 1000 mg oral tablet: 1 tab(s) orally 2 times a day  · Incruse Ellipta 62.5 mcg/inh inhalation powder: 1 puff(s) inhaled once a day    REVIEW OF SYSTEMS:    Review of Systems:  · RESPIRATORY: - - -  · Respiratory [+]: SHORTNESS OF BREATH  · ROS STATEMENT: all other ROS negative except as per HPI    PHYSICAL EXAM:   · CONSTITUTIONAL: - - -  · Appearance: well appearing  · Development: well developed  · Distress: MILD  · Mentation: awake, alert, oriented to person, place, time/situation  · Mood: appropriate  · Nourishment: well  · CARDIAC: Normal rate, regular rhythm.  Heart sounds S1, S2.  No murmurs, rubs or gallops.  · RESPIRATORY: - - -  · Respiratory Distress: YES  · Breath Sounds: WHEEZES  · Wheezes: DIFFUSE  · Chest Exam: USE OF ACCESSORY MUSCLES  · GASTROINTESTINAL: Abdomen soft, non-tender, no guarding.  · MUSCULOSKELETAL: - - -  · MUSC PEDAL EDEMA: BILATERAL  · Bilateral Pedal Edema: PITTING 4 MM  · NEUROLOGICAL: Alert and oriented, no focal deficits, no motor or sensory deficits.  · SKIN: Skin normal color for race, warm, dry and intact. No evidence of rash.  · PSYCHIATRIC: Alert and oriented to person, place, time/situation. normal mood and affect. no apparent risk to self or others.                PATIENT COMMODORE YUE [facility] P [phone]  1940 DOA 2019    VITALS/LABS       2019 afeb 87 140/65 95%   2019 2p bpap 18/6/.21  2019 W 15.6 Hb 13.9 Plt 307 Na 142 K 4.4 CO2 30 Cr 1.2   2019 pc n     PATIENT COMMODORE YUE [facility] P [phone]  1940 DOA 2019    MEDICATIONS      CAD  ASA 81 ()   atorvastat 40 ()   plavix 75 ()   metoprolol 12.5x2 ()   valsartan 80 ()   DVT P   lvnx 40 ()  ABIO   azithro ()   DM   insulin ()   COPD   duoneb.4 ()   pulmicort ()       REVIEW OF SYMPTOMS     NOTE Changess if any  in ROS and PE are updated in daily HOSPITAL COURSE below      Able to give ROS  Yes     RELIABLE No   CONSTITUTIONAL Weakness Yes  Chills No Vision changes No  ENDOCRINE No unexplained hair loss No heat or cold intolerance    ALLERGY No hives  Sore throat No   RESP Coughing blood no  Shortness of breath YES   NEURO No Headache  Confusion Pain neck No   CARDIAC No Chest pain No Palpitations   GI No Pain abdomen NO   Vomiting NO     PHYSICAL EXAM    HEENT Unremarkable PERRLA atraumatic   RESP Fair air entry EXP prolonged    Harsh breath sound Resp distres mild   CARDIAC S1 S2 No S3     NO JVD    ABDOMEN SOFT BS PRESENT NOT DISTENDED No hepatosplenomegaly PEDAL EDEMA present No calf tenderness  NO rash   GENERAL Not TOXIC

## 2019-08-12 NOTE — H&P ADULT - NSHPPHYSICALEXAM_GEN_ALL_CORE
T(C): 36.1 (08-12-19 @ 04:05), Max: 36.1 (08-12-19 @ 04:05)  HR: 120 (08-12-19 @ 05:37) (120 - 124)  BP: 146/83 (08-12-19 @ 05:37) (134/72 - 182/95)  RR: 18 (08-12-19 @ 05:37) (16 - 32)  SpO2: 98% (08-12-19 @ 05:37) (98% - 100%)    GENERAL: patient appears well, no acute distress, appropriate, pleasant  EYES: sclera clear, no exudates  ENMT: oropharynx clear without erythema, no exudates, moist mucous membranes  NECK: supple, soft, no thyromegaly noted  LUNGS: good air entry bilaterally, clear to auscultation, symmetric breath sounds, no wheezing or rhonchi appreciated  HEART: soft S1/S2, regular rate and rhythm, no murmurs noted, no lower extremity edema  GASTROINTESTINAL: abdomen is soft, nontender, nondistended, normoactive bowel sounds, no palpable masses  INTEGUMENT: good skin turgor, no lesions noted  MUSCULOSKELETAL: no clubbing or cyanosis, no obvious deformity  NEUROLOGIC: awake, alert, oriented x3, good muscle tone in 4 extremities, no obvious sensory deficits  PSYCHIATRIC: mood is good, affect is congruent, linear and logical thought process  HEME/LYMPH: no palpable supraclavicular nodules, no obvious ecchymosis or petechiae T(C): 36.1 (08-12-19 @ 04:05), Max: 36.1 (08-12-19 @ 04:05)  HR: 120 (08-12-19 @ 05:37) (120 - 124)  BP: 146/83 (08-12-19 @ 05:37) (134/72 - 182/95)  RR: 18 (08-12-19 @ 05:37) (16 - 32)  SpO2: 98% (08-12-19 @ 05:37) (98% - 100%)    GENERAL: patient appears well, currently on BIPAP no acute distress, appropriate, pleasant  EYES: sclera clear, no exudates  ENMT: oropharynx clear without erythema, no exudates, moist mucous membranes  NECK: supple, soft, no thyromegaly noted  LUNGS: good air entry bilaterally, clear to auscultation, symmetric breath sounds, no wheezing or rhonchi appreciated  HEART:  S1/S2 and tachycardic, no murmurs noted, no lower extremity edema  GASTROINTESTINAL: abdomen is soft, nontender, nondistended, normoactive bowel sounds, no palpable masses  INTEGUMENT: good skin turgor, no lesions noted  MUSCULOSKELETAL: no clubbing or cyanosis, no obvious deformity  NEUROLOGIC: awake, alert, oriented x3, good muscle tone in 4 extremities, no obvious sensory deficits  PSYCHIATRIC: mood is good, affect is congruent, linear and logical thought process  HEME/LYMPH: no palpable supraclavicular nodules, no obvious ecchymosis or petechiae T(C): 36.1 (08-12-19 @ 04:05), Max: 36.1 (08-12-19 @ 04:05)  HR: 120 (08-12-19 @ 05:37) (120 - 124)  BP: 146/83 (08-12-19 @ 05:37) (134/72 - 182/95)  RR: 18 (08-12-19 @ 05:37) (16 - 32)  SpO2: 98% (08-12-19 @ 05:37) (98% - 100%)    GENERAL: patient appears well, currently on BIPAP no acute distress, appropriate, pleasant  EYES: sclera clear, no exudates  ENMT: oropharynx clear without erythema, no exudates, moist mucous membranes  NECK: supple, soft, no thyromegaly noted  LUNGS: on BIPAP with wheezes and rhonchi present in bilateral lung fields  HEART:  S1/S2 and tachycardic, no murmurs noted, no lower extremity edema  GASTROINTESTINAL: abdomen is soft, nontender, nondistended, normoactive bowel sounds, no palpable masses  INTEGUMENT: good skin turgor, no lesions noted  MUSCULOSKELETAL: no clubbing or cyanosis, no obvious deformity  NEUROLOGIC: awake, alert, oriented x3, good muscle tone in 4 extremities, no obvious sensory deficits  PSYCHIATRIC: mood is good, affect is congruent, linear and logical thought process  HEME/LYMPH: no palpable supraclavicular nodules, no obvious ecchymosis or petechiae

## 2019-08-13 DIAGNOSIS — R78.81 BACTEREMIA: ICD-10-CM

## 2019-08-13 LAB
-  COAGULASE NEGATIVE STAPHYLOCOCCUS: SIGNIFICANT CHANGE UP
ALBUMIN SERPL ELPH-MCNC: 3.2 G/DL — LOW (ref 3.3–5)
ALP SERPL-CCNC: 73 U/L — SIGNIFICANT CHANGE UP (ref 40–120)
ALT FLD-CCNC: 29 U/L — SIGNIFICANT CHANGE UP (ref 12–78)
ANION GAP SERPL CALC-SCNC: 5 MMOL/L — SIGNIFICANT CHANGE UP (ref 5–17)
AST SERPL-CCNC: 12 U/L — LOW (ref 15–37)
BASOPHILS # BLD AUTO: 0.01 K/UL — SIGNIFICANT CHANGE UP (ref 0–0.2)
BASOPHILS NFR BLD AUTO: 0.1 % — SIGNIFICANT CHANGE UP (ref 0–2)
BILIRUB SERPL-MCNC: 0.3 MG/DL — SIGNIFICANT CHANGE UP (ref 0.2–1.2)
BUN SERPL-MCNC: 20 MG/DL — SIGNIFICANT CHANGE UP (ref 7–23)
CALCIUM SERPL-MCNC: 9.6 MG/DL — SIGNIFICANT CHANGE UP (ref 8.5–10.1)
CHLORIDE SERPL-SCNC: 106 MMOL/L — SIGNIFICANT CHANGE UP (ref 96–108)
CO2 SERPL-SCNC: 33 MMOL/L — HIGH (ref 22–31)
CREAT SERPL-MCNC: 1.1 MG/DL — SIGNIFICANT CHANGE UP (ref 0.5–1.3)
EOSINOPHIL # BLD AUTO: 0.01 K/UL — SIGNIFICANT CHANGE UP (ref 0–0.5)
EOSINOPHIL NFR BLD AUTO: 0.1 % — SIGNIFICANT CHANGE UP (ref 0–6)
GLUCOSE SERPL-MCNC: 205 MG/DL — HIGH (ref 70–99)
GRAM STN FLD: SIGNIFICANT CHANGE UP
GRAM STN FLD: SIGNIFICANT CHANGE UP
HBA1C BLD-MCNC: 7 % — HIGH (ref 4–5.6)
HCT VFR BLD CALC: 38 % — LOW (ref 39–50)
HGB BLD-MCNC: 11.8 G/DL — LOW (ref 13–17)
IMM GRANULOCYTES NFR BLD AUTO: 1 % — SIGNIFICANT CHANGE UP (ref 0–1.5)
LYMPHOCYTES # BLD AUTO: 0.65 K/UL — LOW (ref 1–3.3)
LYMPHOCYTES # BLD AUTO: 6.5 % — LOW (ref 13–44)
MAGNESIUM SERPL-MCNC: 2.1 MG/DL — SIGNIFICANT CHANGE UP (ref 1.6–2.6)
MCHC RBC-ENTMCNC: 28 PG — SIGNIFICANT CHANGE UP (ref 27–34)
MCHC RBC-ENTMCNC: 31.1 GM/DL — LOW (ref 32–36)
MCV RBC AUTO: 90.3 FL — SIGNIFICANT CHANGE UP (ref 80–100)
METHOD TYPE: SIGNIFICANT CHANGE UP
MONOCYTES # BLD AUTO: 0.67 K/UL — SIGNIFICANT CHANGE UP (ref 0–0.9)
MONOCYTES NFR BLD AUTO: 6.7 % — SIGNIFICANT CHANGE UP (ref 2–14)
NEUTROPHILS # BLD AUTO: 8.57 K/UL — HIGH (ref 1.8–7.4)
NEUTROPHILS NFR BLD AUTO: 85.6 % — HIGH (ref 43–77)
NRBC # BLD: 0 /100 WBCS — SIGNIFICANT CHANGE UP (ref 0–0)
PHOSPHATE SERPL-MCNC: 2.5 MG/DL — SIGNIFICANT CHANGE UP (ref 2.5–4.5)
PLATELET # BLD AUTO: 265 K/UL — SIGNIFICANT CHANGE UP (ref 150–400)
POTASSIUM SERPL-MCNC: 4.6 MMOL/L — SIGNIFICANT CHANGE UP (ref 3.5–5.3)
POTASSIUM SERPL-SCNC: 4.6 MMOL/L — SIGNIFICANT CHANGE UP (ref 3.5–5.3)
PROT SERPL-MCNC: 6.2 G/DL — SIGNIFICANT CHANGE UP (ref 6–8.3)
RAPID RVP RESULT: SIGNIFICANT CHANGE UP
RBC # BLD: 4.21 M/UL — SIGNIFICANT CHANGE UP (ref 4.2–5.8)
RBC # FLD: 13.3 % — SIGNIFICANT CHANGE UP (ref 10.3–14.5)
SODIUM SERPL-SCNC: 144 MMOL/L — SIGNIFICANT CHANGE UP (ref 135–145)
SPECIMEN SOURCE: SIGNIFICANT CHANGE UP
WBC # BLD: 10.01 K/UL — SIGNIFICANT CHANGE UP (ref 3.8–10.5)
WBC # FLD AUTO: 10.01 K/UL — SIGNIFICANT CHANGE UP (ref 3.8–10.5)

## 2019-08-13 PROCEDURE — 99233 SBSQ HOSP IP/OBS HIGH 50: CPT | Mod: GC

## 2019-08-13 RX ADMIN — CHLORHEXIDINE GLUCONATE 1 APPLICATION(S): 213 SOLUTION TOPICAL at 05:29

## 2019-08-13 RX ADMIN — Medication 1 TABLET(S): at 12:16

## 2019-08-13 RX ADMIN — Medication 40 MILLIGRAM(S): at 06:33

## 2019-08-13 RX ADMIN — Medication 2: at 08:35

## 2019-08-13 RX ADMIN — ATORVASTATIN CALCIUM 40 MILLIGRAM(S): 80 TABLET, FILM COATED ORAL at 22:15

## 2019-08-13 RX ADMIN — Medication 3 MILLILITER(S): at 13:46

## 2019-08-13 RX ADMIN — TAMSULOSIN HYDROCHLORIDE 0.4 MILLIGRAM(S): 0.4 CAPSULE ORAL at 22:15

## 2019-08-13 RX ADMIN — Medication 30 MILLIGRAM(S): at 12:16

## 2019-08-13 RX ADMIN — INSULIN GLARGINE 20 UNIT(S): 100 INJECTION, SOLUTION SUBCUTANEOUS at 22:15

## 2019-08-13 RX ADMIN — AZITHROMYCIN 500 MILLIGRAM(S): 500 TABLET, FILM COATED ORAL at 13:39

## 2019-08-13 RX ADMIN — Medication 6: at 17:26

## 2019-08-13 RX ADMIN — Medication 4: at 12:15

## 2019-08-13 RX ADMIN — Medication 0.5 MILLIGRAM(S): at 07:28

## 2019-08-13 RX ADMIN — Medication 81 MILLIGRAM(S): at 12:16

## 2019-08-13 RX ADMIN — Medication 2: at 22:15

## 2019-08-13 RX ADMIN — Medication 3 MILLILITER(S): at 02:05

## 2019-08-13 RX ADMIN — ENOXAPARIN SODIUM 40 MILLIGRAM(S): 100 INJECTION SUBCUTANEOUS at 12:15

## 2019-08-13 RX ADMIN — Medication 3 MILLILITER(S): at 21:20

## 2019-08-13 RX ADMIN — CLOPIDOGREL BISULFATE 75 MILLIGRAM(S): 75 TABLET, FILM COATED ORAL at 12:16

## 2019-08-13 RX ADMIN — Medication 0.5 MILLIGRAM(S): at 21:20

## 2019-08-13 RX ADMIN — Medication 3 MILLILITER(S): at 07:28

## 2019-08-13 RX ADMIN — Medication 12.5 MILLIGRAM(S): at 17:26

## 2019-08-13 NOTE — PROGRESS NOTE ADULT - ASSESSMENT
78 y/o male with PMHx of HTN, COPD (on 2 L NC at home and bipap QHS), CAD s/p CABG x 3, HLD, DM II, cardiac arrest due to COPD exacerbation in June 2018 now with acute severe COPD exacerbation. Admitted to ICU for worsening ABG. ABG now improving on bipap.    Neuro: no active issues  CV: HTN, CAD, HLD. Hemodynamically stable. Continue ASA, plavix, metoprolol, valsartan, atorvastatin.  Pulm: Continue BiPAP 18/6, dec FiO2 to maintain SpO2 88%-94%. Continue Solumedrol, azithromycin, budenoside, duonebs  GI: Doing well on DASH diet.  Renal/lytes: no active issues  ID: Negative RVP, no active issues.  Endo: Continue lantus and ISS.  Heme: Lovenox for ppx. 80 y/o male with PMHx of HTN, COPD (on 2 L NC at home and bipap QHS), CAD s/p CABG x 3, HLD, DM II, cardiac arrest due to COPD exacerbation in June 2018 now with acute severe COPD exacerbation. Admitted to ICU for worsening ABG. ABG now improving on bipap.    Neuro: no active issues  CV: HTN, CAD, HLD. Hemodynamically stable. Continue ASA, plavix, metoprolol, valsartan, atorvastatin.  Pulm: Continue BiPAP 18/6 and FiO2 50% overnight to maintain SpO2 88%-94%. During the day, on home 2L NC. Continue Solumedrol, azithromycin, budenoside, duonebs  GI: Doing well on DASH diet.  Renal/lytes: no active issues  ID: Negative RVP, no active issues.  Endo: Continue lantus and ISS.  Heme: Lovenox for ppx.  Dispo: Stable to transfer to medicine floor. 80 y/o male with PMHx of HTN, COPD (on 2 L NC at home and bipap QHS), CAD s/p CABG x 3, HLD, DM II, cardiac arrest due to COPD exacerbation in June 2018 now with acute severe COPD exacerbation. Admitted to ICU for worsening ABG. ABG now improving on bipap.    Neuro: no active issues  CV: HTN, CAD, HLD. Hemodynamically stable. Continue ASA, plavix, metoprolol, valsartan, atorvastatin.  Pulm: Continue BiPAP 18/6 and FiO2 50% overnight to maintain SpO2 88%-94%. During the day, on home 2L NC. D/c Solumedrol and switch to oral prednisone for a total of 80mg today, 60mg tomorrow, and steroid taper thereafter. Continue azithromycin, budenoside, duonebs.  GI: Doing well on DASH diet.  Renal/lytes: no active issues  ID: Negative RVP, no active issues.  Endo: Continue lantus and ISS.  Heme: Lovenox for ppx.  Dispo: Stable to transfer to medicine floor.

## 2019-08-13 NOTE — PROGRESS NOTE ADULT - PROBLEM SELECTOR PLAN 1
-admit to icu  -ABG with respiratory acidosis, will continue BiPAP at night and nasal cannual during the day, monitor abg  -s/p 125mg solumedrol and duoneb x1 in ED  -stop solumedrol 40mg q8h and switch to oral prednisone for a total of 80mg today, 60mg tomorrow, and steroid taper thereafter  -continue azithromycin  -continue duonebs q6h standing, and elevated head of bed  -f/u procalcitonin level though doubt infectious cause at this time, leukocytosis likely secondary to steroid usage   -Blood cultures from 8/12 grew gram positive cocci in clusters, like contaminant  -CXR: Hyperinflated lungs. No active infiltrates.  -pulm, Dr. Dejesus, consulted, f/u recs  -management per ICU team -admitted to micu but scheduled for downgrade  -ABG with respiratory acidosis, will continue BiPAP at night and nasal cannual during the day, monitor abg  -s/p 125mg solumedrol and duoneb x1 in ED  -stop solumedrol 40mg q8h and switch to oral prednisone for a total of 80mg today, 60mg tomorrow, and steroid taper thereafter  -continue azithromycin  -continue duonebs q6h standing, and elevated head of bed  -f/u procalcitonin level though doubt infectious cause at this time, leukocytosis likely secondary to steroid usage   -Blood cultures from 8/12 grew gram positive cocci in clusters, like contaminant  -CXR: Hyperinflated lungs. No active infiltrates.  -pulm following (Oleg)

## 2019-08-13 NOTE — DIETITIAN INITIAL EVALUATION ADULT. - OTHER INFO
patient and wife seen this AM. patient eating well this AM with good appetite. food preferences noted. patient known from previous admit. good diabetic control follows consistent cho dash tlc diet at home and educated last admit. patient admitted with COPD exacerbation . no problems chewing swallowing . shellfish allergy noted. weight stable.

## 2019-08-13 NOTE — PROGRESS NOTE ADULT - PROBLEM SELECTOR PLAN 7
DVT prophylaxis with lovenox    IMPROVE VTE Individual Risk Assessment  RISK                                                                Points  [  ] Previous VTE                                                  3  [  ] Thrombophilia                                               2  [  ] Lower limb paralysis                                      2        (unable to hold up >15 seconds)    [  ] Current Cancer                                              2         (within 6 months)  [  ] Immobilization > 24 hrs     1  [  ] ICU/CCU stay > 24 hours                              1  [ x ] Age > 60                                                      1  IMPROVE VTE Score ____1_____  DVT ppx with lovenox 40 subQ qd Chronic, stable  -continue home atorvastatin

## 2019-08-13 NOTE — PROGRESS NOTE ADULT - SUBJECTIVE AND OBJECTIVE BOX
Patient was examined Chart reviewed Detailed note will be inserted below after going over latest data Meanwhile please refer to my previous notes for Pulmonary/Critical Care assessment recommendations COMMODORE TURNER [facility] P [phone]  1940 DOA 2019  ALLERGY Shellfish  CONTACT Sp [facility]      Initial evaluation/Pulmonary Critical Care consultation requested on  2019  by Dr Staton   from Dr Dejesus   Patient examined chart reviewed    HOSPITAL ADMISSION   PATIENT CAME  FROM (if information available)        TYPE OF VISIT      Subsequent Pulmonary followup     REASON FOR VISIT  PLEASE SEE PROBLEM LIST/ASSESSMENTAND RECOMMENDATIONS     PATIENT COMMODORE TURNER [facility] P [phone]  1940 DOA 2019    VITALS/LABS       2019 afeb 95 120/60 93%   2019 bpap 18/6/.21   2019 W 10 Hb 11.8 Plt 265 Na 144 K 4.6 CO2 33 Cr 1.1     REVIEW OF SYMPTOMS     NOTE Changess if any  in ROS and PE are updated in daily HOSPITAL COURSE below      Able to give ROS  Yes     RELIABLE No   CONSTITUTIONAL Weakness Yes  Chills No Vision changes No  ENDOCRINE No unexplained hair loss No heat or cold intolerance    ALLERGY No hives  Sore throat No   RESP Coughing blood no  Shortness of breath YES   NEURO No Headache  Confusion Pain neck No   CARDIAC No Chest pain No Palpitations   GI No Pain abdomen NO   Vomiting NO     PHYSICAL EXAM    HEENT Unremarkable PERRLA atraumatic   RESP Fair air entry EXP prolonged    Harsh breath sound Resp distres mild   CARDIAC S1 S2 No S3     NO JVD    ABDOMEN SOFT BS PRESENT NOT DISTENDED No hepatosplenomegaly PEDAL EDEMA present No calf tenderness  NO rash   GENERAL Not TOXIC

## 2019-08-13 NOTE — DIETITIAN INITIAL EVALUATION ADULT. - PROBLEM SELECTOR PLAN 1
-admit to tele  -ABG with respiratory acidosis, will continue BiPAP for now and repeat ABG  -NPO at this time as patient is on BiPAP  -s/p 125mg solumedrol and duoneb x1 in ED, will continue on 40mg q8h. Continue duonebs q6h standing, and elevated head of bed  -f/u procalcitonin level though doubt infectious cause at this time, leukocytosis likely secondary to steroid usage  -repeat ABG in the morning (8am) and will consider weaning   -pulDr. Oleg rain, consulted, f/u recs

## 2019-08-13 NOTE — PROGRESS NOTE ADULT - PROBLEM SELECTOR PLAN 3
-Chronic, stable  -Home meds include metformin and glipizide  -Will place patient on moderate dose insulin sliding scale (considering patient will be on steroids), accuchecks, and hypoglycemia protocol  -monitor blood glucose while on systemic steroids gram positive cocci in clusters - like CoNS and probably contaminant. may repeat blood culture pending clinical course  - no indication for gram positive targeted abx regimen at this time

## 2019-08-13 NOTE — PROGRESS NOTE ADULT - PROBLEM SELECTOR PLAN 6
Chronic, stable  -continue home atorvastatin Chronic, stable, hx of CABG  -continue home ASA 81, plavix 75  -Continue atorvastatin

## 2019-08-13 NOTE — PROGRESS NOTE ADULT - SUBJECTIVE AND OBJECTIVE BOX
Interval events:    ROS    ICU Vital Signs Last 24 Hrs  T(F): 97.6 (13 Aug 2019 07:23), Max: 98.5 (12 Aug 2019 19:29)  HR: 87 (13 Aug 2019 07:00) (59 - 117)  BP: 115/66 (13 Aug 2019 07:00) (108/58 - 156/83)  BP(mean): 84 (13 Aug 2019 07:00) (78 - 112)  RR: 35 (13 Aug 2019 07:00) (13 - 37)  SpO2: 100% (13 Aug 2019 07:00) (84% - 100%)    I&O's Summary    12 Aug 2019 07:01  -  13 Aug 2019 07:00  --------------------------------------------------------  IN: 600 mL / OUT: 2025 mL / NET: -1425 mL      Physical Exam  General:  Neuro:  CV:  Pulm:  GI:  Ext:  Skin:      MEDICATIONS  (STANDING):  ALBUTerol/ipratropium for Nebulization 3 milliLiter(s) Nebulizer every 6 hours  aspirin enteric coated 81 milliGRAM(s) Oral daily  atorvastatin 40 milliGRAM(s) Oral at bedtime  azithromycin   Tablet 500 milliGRAM(s) Oral daily  buDESOnide    Inhalation Suspension 0.5 milliGRAM(s) Inhalation two times a day  chlorhexidine 2% Cloths 1 Application(s) Topical <User Schedule>  clopidogrel Tablet 75 milliGRAM(s) Oral daily  dextrose 5%. 1000 milliLiter(s) (50 mL/Hr) IV Continuous <Continuous>  dextrose 50% Injectable 12.5 Gram(s) IV Push once  dextrose 50% Injectable 25 Gram(s) IV Push once  dextrose 50% Injectable 25 Gram(s) IV Push once  enoxaparin Injectable 40 milliGRAM(s) SubCutaneous daily  insulin glargine Injectable (LANTUS) 20 Unit(s) SubCutaneous at bedtime  insulin lispro (HumaLOG) corrective regimen sliding scale   SubCutaneous Before meals and at bedtime  methylPREDNISolone sodium succinate Injectable 40 milliGRAM(s) IV Push every 8 hours  metoprolol tartrate 12.5 milliGRAM(s) Oral two times a day  multivitamin 1 Tablet(s) Oral daily  tamsulosin 0.4 milliGRAM(s) Oral at bedtime  valsartan 80 milliGRAM(s) Oral daily    MEDICATIONS  (PRN):  dextrose 40% Gel 15 Gram(s) Oral once PRN Blood Glucose LESS THAN 70 milliGRAM(s)/deciliter  glucagon  Injectable 1 milliGRAM(s) IntraMuscular once PRN Glucose LESS THAN 70 milligrams/deciliter      Labs:                        11.8   10.01 )-----------( 265      ( 13 Aug 2019 05:08 )             38.0       08-13    144  |  106  |  20  ----------------------------<  205<H>  4.6   |  33<H>  |  1.10    Ca    9.6      13 Aug 2019 05:08  Phos  2.5     08-13  Mg     2.1     08-13    TPro  6.2  /  Alb  3.2<L>  /  TBili  0.3  /  DBili  x   /  AST  12<L>  /  ALT  29  /  AlkPhos  73  08-13      SHARMA-    A-line-    Central line-      best pract Interval events:    ROS:    ICU Vital Signs Last 24 Hrs  T(F): 97.6 (13 Aug 2019 07:23), Max: 98.5 (12 Aug 2019 19:29)  HR: 87 (13 Aug 2019 07:00) (59 - 117)  BP: 115/66 (13 Aug 2019 07:00) (108/58 - 156/83)  BP(mean): 84 (13 Aug 2019 07:00) (78 - 112)  RR: 35 (13 Aug 2019 07:00) (13 - 37)  SpO2: 100% (13 Aug 2019 07:00) (84% - 100%)    I&O's Summary    12 Aug 2019 07:01  -  13 Aug 2019 07:00  --------------------------------------------------------  IN: 600 mL / OUT: 2025 mL / NET: -1425 mL      Physical Exam  General:  Neuro:  CV:  Pulm:  GI:  Ext:  Skin:      MEDICATIONS  (STANDING):  ALBUTerol/ipratropium for Nebulization 3 milliLiter(s) Nebulizer every 6 hours  aspirin enteric coated 81 milliGRAM(s) Oral daily  atorvastatin 40 milliGRAM(s) Oral at bedtime  azithromycin   Tablet 500 milliGRAM(s) Oral daily  buDESOnide    Inhalation Suspension 0.5 milliGRAM(s) Inhalation two times a day  chlorhexidine 2% Cloths 1 Application(s) Topical <User Schedule>  clopidogrel Tablet 75 milliGRAM(s) Oral daily  dextrose 5%. 1000 milliLiter(s) (50 mL/Hr) IV Continuous <Continuous>  dextrose 50% Injectable 12.5 Gram(s) IV Push once  dextrose 50% Injectable 25 Gram(s) IV Push once  dextrose 50% Injectable 25 Gram(s) IV Push once  enoxaparin Injectable 40 milliGRAM(s) SubCutaneous daily  insulin glargine Injectable (LANTUS) 20 Unit(s) SubCutaneous at bedtime  insulin lispro (HumaLOG) corrective regimen sliding scale   SubCutaneous Before meals and at bedtime  methylPREDNISolone sodium succinate Injectable 40 milliGRAM(s) IV Push every 8 hours  metoprolol tartrate 12.5 milliGRAM(s) Oral two times a day  multivitamin 1 Tablet(s) Oral daily  tamsulosin 0.4 milliGRAM(s) Oral at bedtime  valsartan 80 milliGRAM(s) Oral daily    MEDICATIONS  (PRN):  dextrose 40% Gel 15 Gram(s) Oral once PRN Blood Glucose LESS THAN 70 milliGRAM(s)/deciliter  glucagon  Injectable 1 milliGRAM(s) IntraMuscular once PRN Glucose LESS THAN 70 milligrams/deciliter      Labs:                        11.8   10.01 )-----------( 265      ( 13 Aug 2019 05:08 )             38.0       08-13    144  |  106  |  20  ----------------------------<  205<H>  4.6   |  33<H>  |  1.10    Ca    9.6      13 Aug 2019 05:08  Phos  2.5     08-13  Mg     2.1     08-13    TPro  6.2  /  Alb  3.2<L>  /  TBili  0.3  /  DBili  x   /  AST  12<L>  /  ALT  29  /  AlkPhos  73  08-13      Rapid RVP (12 Aug 2019 22:12): Not detected      CENTRAL LINE:     SHARMA:           A-LINE:      GLOBAL ISSUE/BEST PRACTICE:  Analgesia: no  Sedation: no  HOB elevation: yes  Stress ulcer prophylaxis: yes  VTE prophylaxis: yes  Glycemic control: no  Nutrition: yes    CODE STATUS: Full Interval events: Pt states he is feeling good and slept well throughout the night. Had bowel movement yesterday.    ROS: Denies CP, SOB, dizziness, abdominal pain.    ICU Vital Signs Last 24 Hrs  T(F): 97.6 (13 Aug 2019 07:23), Max: 98.5 (12 Aug 2019 19:29)  HR: 87 (13 Aug 2019 07:00) (59 - 117)  BP: 115/66 (13 Aug 2019 07:00) (108/58 - 156/83)  BP(mean): 84 (13 Aug 2019 07:00) (78 - 112)  RR: 35 (13 Aug 2019 07:00) (13 - 37)  SpO2: 100% (13 Aug 2019 07:00) (84% - 100%)    I&O's Summary    12 Aug 2019 07:01  -  13 Aug 2019 07:00  --------------------------------------------------------  IN: 600 mL / OUT: 2025 mL / NET: -1425 mL      Physical Exam  General: Resting comfortably in bed on duo-nebs.  Neuro: AAO x3, PERRLA  CV: RRR, no murmurs  Pulm: CTA b/l, no wheezing  GI: soft, nontender abdomen. Bowel sounds present.  Ext: No clubbing or cyanosis  Skin: warm, well profused. 1 peripheral IV in L arm.      MEDICATIONS  (STANDING):  ALBUTerol/ipratropium for Nebulization 3 milliLiter(s) Nebulizer every 6 hours  aspirin enteric coated 81 milliGRAM(s) Oral daily  atorvastatin 40 milliGRAM(s) Oral at bedtime  azithromycin   Tablet 500 milliGRAM(s) Oral daily  buDESOnide    Inhalation Suspension 0.5 milliGRAM(s) Inhalation two times a day  chlorhexidine 2% Cloths 1 Application(s) Topical <User Schedule>  clopidogrel Tablet 75 milliGRAM(s) Oral daily  dextrose 5%. 1000 milliLiter(s) (50 mL/Hr) IV Continuous <Continuous>  dextrose 50% Injectable 12.5 Gram(s) IV Push once  dextrose 50% Injectable 25 Gram(s) IV Push once  dextrose 50% Injectable 25 Gram(s) IV Push once  enoxaparin Injectable 40 milliGRAM(s) SubCutaneous daily  insulin glargine Injectable (LANTUS) 20 Unit(s) SubCutaneous at bedtime  insulin lispro (HumaLOG) corrective regimen sliding scale   SubCutaneous Before meals and at bedtime  methylPREDNISolone sodium succinate Injectable 40 milliGRAM(s) IV Push every 8 hours  metoprolol tartrate 12.5 milliGRAM(s) Oral two times a day  multivitamin 1 Tablet(s) Oral daily  tamsulosin 0.4 milliGRAM(s) Oral at bedtime  valsartan 80 milliGRAM(s) Oral daily    MEDICATIONS  (PRN):  dextrose 40% Gel 15 Gram(s) Oral once PRN Blood Glucose LESS THAN 70 milliGRAM(s)/deciliter  glucagon  Injectable 1 milliGRAM(s) IntraMuscular once PRN Glucose LESS THAN 70 milligrams/deciliter      Labs:                        11.8   10.01 )-----------( 265      ( 13 Aug 2019 05:08 )             38.0       08-13    144  |  106  |  20  ----------------------------<  205<H>  4.6   |  33<H>  |  1.10    Ca    9.6      13 Aug 2019 05:08  Phos  2.5     08-13  Mg     2.1     08-13    TPro  6.2  /  Alb  3.2<L>  /  TBili  0.3  /  DBili  x   /  AST  12<L>  /  ALT  29  /  AlkPhos  73  08-13      Rapid RVP (12 Aug 2019 22:12): Not detected      CENTRAL LINE: N    SHARMA: N           A-LINE: N    GLOBAL ISSUE/BEST PRACTICE:  Analgesia: no  Sedation: no  HOB elevation: yes  Stress ulcer prophylaxis: n/a  VTE prophylaxis: yes  Glycemic control: no  Nutrition: yes    CODE STATUS: Full Interval events: Pt states he is feeling good and slept well throughout the night. Had bowel movement yesterday.    ROS: Denies CP, SOB, dizziness, abdominal pain.    ICU Vital Signs Last 24 Hrs  T(F): 97.6 (13 Aug 2019 07:23), Max: 98.5 (12 Aug 2019 19:29)  HR: 87 (13 Aug 2019 07:00) (59 - 117)  BP: 115/66 (13 Aug 2019 07:00) (108/58 - 156/83)  BP(mean): 84 (13 Aug 2019 07:00) (78 - 112)  RR: 35 (13 Aug 2019 07:00) (13 - 37)  SpO2: 100% (13 Aug 2019 07:00) (84% - 100%)    I&O's Summary    12 Aug 2019 07:01  -  13 Aug 2019 07:00  --------------------------------------------------------  IN: 600 mL / OUT: 2025 mL / NET: -1425 mL      Physical Exam  General: Resting comfortably in bed on duo-nebs.  Neuro: AAO x3, PERRLA  CV: RRR, no murmurs  Pulm: Diminished breath sounds b/l, reduced air flow. No wheezing  GI: soft, nontender abdomen. Bowel sounds present.  Ext: No clubbing or cyanosis  Skin: warm, well profused. 1 peripheral IV in L arm.      MEDICATIONS  (STANDING):  ALBUTerol/ipratropium for Nebulization 3 milliLiter(s) Nebulizer every 6 hours  aspirin enteric coated 81 milliGRAM(s) Oral daily  atorvastatin 40 milliGRAM(s) Oral at bedtime  azithromycin   Tablet 500 milliGRAM(s) Oral daily  buDESOnide    Inhalation Suspension 0.5 milliGRAM(s) Inhalation two times a day  chlorhexidine 2% Cloths 1 Application(s) Topical <User Schedule>  clopidogrel Tablet 75 milliGRAM(s) Oral daily  dextrose 5%. 1000 milliLiter(s) (50 mL/Hr) IV Continuous <Continuous>  dextrose 50% Injectable 12.5 Gram(s) IV Push once  dextrose 50% Injectable 25 Gram(s) IV Push once  dextrose 50% Injectable 25 Gram(s) IV Push once  enoxaparin Injectable 40 milliGRAM(s) SubCutaneous daily  insulin glargine Injectable (LANTUS) 20 Unit(s) SubCutaneous at bedtime  insulin lispro (HumaLOG) corrective regimen sliding scale   SubCutaneous Before meals and at bedtime  methylPREDNISolone sodium succinate Injectable 40 milliGRAM(s) IV Push every 8 hours  metoprolol tartrate 12.5 milliGRAM(s) Oral two times a day  multivitamin 1 Tablet(s) Oral daily  tamsulosin 0.4 milliGRAM(s) Oral at bedtime  valsartan 80 milliGRAM(s) Oral daily    MEDICATIONS  (PRN):  dextrose 40% Gel 15 Gram(s) Oral once PRN Blood Glucose LESS THAN 70 milliGRAM(s)/deciliter  glucagon  Injectable 1 milliGRAM(s) IntraMuscular once PRN Glucose LESS THAN 70 milligrams/deciliter      Labs:                        11.8   10.01 )-----------( 265      ( 13 Aug 2019 05:08 )             38.0       08-13    144  |  106  |  20  ----------------------------<  205<H>  4.6   |  33<H>  |  1.10    Ca    9.6      13 Aug 2019 05:08  Phos  2.5     08-13  Mg     2.1     08-13    TPro  6.2  /  Alb  3.2<L>  /  TBili  0.3  /  DBili  x   /  AST  12<L>  /  ALT  29  /  AlkPhos  73  08-13      Rapid RVP (12 Aug 2019 22:12): Not detected      CENTRAL LINE: N    SHARMA: N           A-LINE: N    GLOBAL ISSUE/BEST PRACTICE:  Analgesia: no  Sedation: no  HOB elevation: yes  Stress ulcer prophylaxis: n/a  VTE prophylaxis: yes  Glycemic control: no  Nutrition: yes    CODE STATUS: Full

## 2019-08-13 NOTE — DIETITIAN INITIAL EVALUATION ADULT. - PROBLEM SELECTOR PLAN 4
Chronic, stable  -Continue home meds -metoprolol tartrate 12.5mg q12h and valsartan 80mg daily with hold parameters  -monitor routine hemodynamics

## 2019-08-13 NOTE — DIETITIAN INITIAL EVALUATION ADULT. - PROBLEM SELECTOR PLAN 2
- Patient currently on bipap and home oxygen 2L  - Patient received duonebs and solumedrol 125mg in ED  - continue with duonebs q6, pulmicort and solumedrol  - Will monitor repeat ABG'  - Leukocytosis likely from steroids; F/u procalcitonin  - will defer need for antibiotics to pulmonary  - F/u with official chest X-ray (no focal consolidation on personal read)

## 2019-08-13 NOTE — PROVIDER CONTACT NOTE (CRITICAL VALUE NOTIFICATION) - ACTION/TREATMENT ORDERED:
No new orders/recommendations
pt upgraded to ICU
continue treatment with bipap.  pt showing marked signs of improvement since arrival.  continue to monitor
No new orders/recommendations

## 2019-08-13 NOTE — DIETITIAN INITIAL EVALUATION ADULT. - ENERGY NEEDS
Ht 71" Wt 204.8# BMI 28.5 [id number]+/-10% generalized left and right foot edema, right and left ankle edema noted.

## 2019-08-13 NOTE — PROGRESS NOTE ADULT - PROBLEM SELECTOR PLAN 5
Chronic, stable, hx of CABG  -continue home ASA 81, plavix 75  -Continue atorvastatin Chronic, stable  -Continue home meds -metoprolol tartrate 12.5mg q12h and valsartan 80mg daily with hold parameters  -monitor routine hemodynamics

## 2019-08-13 NOTE — PROVIDER CONTACT NOTE (CRITICAL VALUE NOTIFICATION) - TEST AND RESULT REPORTED:
BC 12-growth in anerobic bottle, gram positive clusters BC 8/12/19-growth in anaerobic bottle, gram positive cocci in clusters

## 2019-08-13 NOTE — DIETITIAN INITIAL EVALUATION ADULT. - PROBLEM SELECTOR PLAN 3
Chronic  -Home meds include metformin and glipizide  -Will place patient on moderate dose insulin sliding scale (considering patient will be on steroids), accuchecks, and hypoglycemia protocol  -monitor blood glucose while on systemic steroids

## 2019-08-13 NOTE — PROGRESS NOTE ADULT - SUBJECTIVE AND OBJECTIVE BOX
Patient is a 79y old  Male who presents with a chief complaint of COPD exacerbation (13 Aug 2019 07:29)    FROM ADMISSION H+P:   HPI:  79 year old M with PMH HTN, COPD (On home O2 2L and BIPAP at while sleeping regardless of time of day), CAD w/ 3v CABG, HLD, DM2 (on Metformin), 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 Per his wife, the patient was feeling well up until this afternoon when he started to feel SOB. His oxygen saturation continued to worsen throughout the day without any improvement following the Bipap. He denies any fever, chills, chest pain. abdominal pain and. He denies any sick contacts He is currently compliant with all his home meds. Patient states he feels much improved in the ED on BIPAP.     Of note, patient recently admitted on 7/22 at [facility] for COPD exacerbation.    In the ED, T 96.9, , /95 --> 146/83, RR 32 SpO2 100% on 6L NC then placed on BiPAP.  Labs significant for WBC 15.65, POCT 169, proBNP 134, ABG showed pH 7.22 pCO2 72 on BiPAP.  Patient received solumedrol 125mg IV x1 and duoneb x1 in the ED.    EKG: sinus tachycardia (personally reviewed)  CXR: hyperinflated lungs, flattened diaphragm, no focal consolidation (personally reviewed) (12 Aug 2019 05:56)    ----  INTERVAL HPI/OVERNIGHT EVENTS: Pt seen and evaluated at the bedside. No acute overnight events occurred. Patient reports he is feeling much better today. Patient breathing comfortably on 2L O2 nasal cannula. Patient denies SOB, chest pain at this time.  ----  PAST MEDICAL & SURGICAL HISTORY:  PVD (peripheral vascular disease)  Hypertension  COPD (chronic obstructive pulmonary disease)  Osteomyelitis  Dyslipidemia  CAD (Coronary Artery Disease)  Diabetes Mellitus, Type II  Compound fracture: left leg  CABG (Coronary Artery Bypass Graft)    FAMILY HISTORY:  Family history of diabetes mellitus (Sibling)    Allergies  No Known Allergies    Intolerances  shellfish (Nausea)    ----  REVIEW OF SYSTEMS:  CONSTITUTIONAL: denies fever, chills, fatigue, weakness  HEENT: denies blurred vision, sore throat  SKIN: denies new lesions, rash  CARDIOVASCULAR: denies chest pain, chest pressure, palpitations  RESPIRATORY: denies shortness of breath, sputum production  GASTROINTESTINAL: denies nausea, vomiting, diarrhea, abdominal pain  GENITOURINARY: denies dysuria, discharge  NEUROLOGICAL: denies numbness, headache, focal weakness  MUSCULOSKELETAL: denies new joint pain, muscle aches  HEMATOLOGIC: denies gross bleeding, bruising    ----  PHYSICAL EXAM:  GENERAL: patient appears well, no acute distress, appropriately interactive  EYES: sclera clear, no exudates  ENMT: oropharynx clear without erythema, moist mucous membranes  LUNGS: decreased breath sounds b/l, no wheezing or rhonchi appreciated  HEART: soft S1/S2, regular rate and rhythm, no murmurs noted, no noted edema to b/l LE  GASTROINTESTINAL: abdomen is soft, nontender, nondistended, normoactive bowel sounds, no palpable masses  INTEGUMENT: good skin turgor, appropriate for ethnicity, appears well perfused, no jaundice noted  MUSCULOSKELETAL: no clubbing or cyanosis, no obvious deformity  NEUROLOGIC: awake, alert, oriented x3, good muscle tone in 4 extremities, no obvious sensory deficits    T(C): 36.7 (08-13-19 @ 15:35), Max: 36.9 (08-12-19 @ 19:29)  HR: 95 (08-13-19 @ 16:00) (59 - 117)  BP: 140/64 (08-13-19 @ 16:00) (108/58 - 144/67)  RR: 15 (08-13-19 @ 16:00) (13 - 37)  SpO2: 100% (08-13-19 @ 16:00) (93% - 100%)  Wt(kg): --    ----  I&O's Summary    12 Aug 2019 07:01  -  13 Aug 2019 07:00  --------------------------------------------------------  IN: 600 mL / OUT: 2025 mL / NET: -1425 mL    13 Aug 2019 07:01  -  13 Aug 2019 16:50  --------------------------------------------------------  IN: 830 mL / OUT: 400 mL / NET: 430 mL      LABS:                        11.8   10.01 )-----------( 265      ( 13 Aug 2019 05:08 )             38.0     08-13    144  |  106  |  20  ----------------------------<  205<H>  4.6   |  33<H>  |  1.10    Ca    9.6      13 Aug 2019 05:08  Phos  2.5     08-13  Mg     2.1     08-13    TPro  6.2  /  Alb  3.2<L>  /  TBili  0.3  /  DBili  x   /  AST  12<L>  /  ALT  29  /  AlkPhos  73  08-13      CAPILLARY BLOOD GLUCOSE  POCT Blood Glucose.: 247 mg/dL (13 Aug 2019 12:13)  POCT Blood Glucose.: 190 mg/dL (13 Aug 2019 08:28)  POCT Blood Glucose.: 279 mg/dL (12 Aug 2019 22:12)  POCT Blood Glucose.: 230 mg/dL (12 Aug 2019 17:51)    ABG - ( 12 Aug 2019 10:37 )  pH, Arterial: 7.28  pH, Blood: x     /  pCO2: 60    /  pO2: 87    / HCO3: 24    / Base Excess: 1.3   /  SaO2: 96          08-12 @ 09:02   Growth in anaerobic bottle: Gram Positive Cocci in Clusters  "Due to technical problems, Proteus sp. will Not be reported as part of  the BCID panel until further notice"  ***Blood Panel PCR results on this specimen are available  approximately 3 hours after the Gram stain result.***  Gram stain, PCR, and/or culture results may not always  correspond due to difference in methodologies.  ************************************************************  This PCR assay was performed using [employer].  The following targets are tested for: Enterococcus,  vancomycin resistant enterococci, Listeria monocytogenes,  coagulase negative staphylococci, S. aureus,  methicillin resistant S. aureus, Streptococcus agalactiae  (Group B), S. pneumoniae, S. pyogenes (Group A),  Acinetobacter baumannii, Enterobacter cloacae, E. coli,  Klebsiella oxytoca, K. pneumoniae, Proteus sp.,  Serratia marcescens, Haemophilus influenzae,  Neisseria meningitidis, Pseudomonas aeruginosa, Candida  albicans, C. glabrata, C krusei, C parapsilosis,  C. tropicalis and the KPC resistance gene.  --  Blood Culture PCR      ----  Personally reviewed:  Vital sign trends: [ x ] yes    [  ] no     [  ] n/a  Laboratory results: [ x ] yes    [  ] no     [  ] n/a  Radiology results: [  ] yes    [  ] no     [ x ] n/a  Culture results: [ x ] yes    [  ] no     [  ] n/a  Consultant recommendations: [ x ] yes    [  ] no     [  ] n/a Patient is a 79y old  Male who presents with a chief complaint of COPD exacerbation (13 Aug 2019 07:29)    FROM ADMISSION H+P:   HPI:  79 year old M with PMH HTN, COPD (On home O2 2L and BIPAP at while sleeping regardless of time of day), CAD w/ 3v CABG, HLD, DM2 (on Metformin), 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 Per his wife, the patient was feeling well up until this afternoon when he started to feel SOB. His oxygen saturation continued to worsen throughout the day without any improvement following the Bipap. He denies any fever, chills, chest pain. abdominal pain and. He denies any sick contacts He is currently compliant with all his home meds. Patient states he feels much improved in the ED on BIPAP.     Of note, patient recently admitted on 7/22 at [facility] for COPD exacerbation.    In the ED, T 96.9, , /95 --> 146/83, RR 32 SpO2 100% on 6L NC then placed on BiPAP.  Labs significant for WBC 15.65, POCT 169, proBNP 134, ABG showed pH 7.22 pCO2 72 on BiPAP.  Patient received solumedrol 125mg IV x1 and duoneb x1 in the ED.    EKG: sinus tachycardia (personally reviewed)  CXR: hyperinflated lungs, flattened diaphragm, no focal consolidation (personally reviewed) (12 Aug 2019 05:56)    ----  INTERVAL HPI/OVERNIGHT EVENTS: Pt seen and evaluated at the bedside. No acute overnight events occurred. Patient reports he is feeling much better today. Patient breathing comfortably on 2L O2 nasal cannula. Patient denies SOB, chest pain at this time.    ----  PAST MEDICAL & SURGICAL HISTORY:  PVD (peripheral vascular disease)  Hypertension  COPD (chronic obstructive pulmonary disease)  Osteomyelitis  Dyslipidemia  CAD (Coronary Artery Disease)  Diabetes Mellitus, Type II  Compound fracture: left leg  CABG (Coronary Artery Bypass Graft)    FAMILY HISTORY:  Family history of diabetes mellitus (Sibling)    Allergies  No Known Allergies    Intolerances  shellfish (Nausea)    ----  REVIEW OF SYSTEMS:  CONSTITUTIONAL: denies fever, chills, fatigue, weakness  HEENT: denies blurred vision, sore throat  SKIN: denies new lesions, rash  CARDIOVASCULAR: denies chest pain, chest pressure, palpitations  RESPIRATORY: dyspnea improving today. still on nasal cannula. needed bipap this afternoon.   GASTROINTESTINAL: denies nausea, vomiting, diarrhea, abdominal pain  GENITOURINARY: denies dysuria, discharge  NEUROLOGICAL: denies numbness, headache, focal weakness  MUSCULOSKELETAL: denies new joint pain, muscle aches  HEMATOLOGIC: denies gross bleeding, bruising    ----  PHYSICAL EXAM:  GENERAL: patient appears well, no acute distress, appropriately interactive  EYES: sclera clear, no exudates  ENMT: oropharynx clear without erythema, moist mucous membranes  LUNGS: decreased breath sounds b/l, no wheezing or rhonchi appreciated  HEART: soft S1/S2, regular rate and rhythm, no murmurs noted, no noted edema to b/l LE  GASTROINTESTINAL: abdomen is soft, nontender, nondistended, normoactive bowel sounds, no palpable masses  INTEGUMENT: good skin turgor, appropriate for ethnicity, appears well perfused, no jaundice noted  MUSCULOSKELETAL: no clubbing or cyanosis, no obvious deformity  NEUROLOGIC: awake, alert, oriented x3, good muscle tone in 4 extremities, no obvious sensory deficits    T(C): 36.7 (08-13-19 @ 15:35), Max: 36.9 (08-12-19 @ 19:29)  HR: 95 (08-13-19 @ 16:00) (59 - 117)  BP: 140/64 (08-13-19 @ 16:00) (108/58 - 144/67)  RR: 15 (08-13-19 @ 16:00) (13 - 37)  SpO2: 100% (08-13-19 @ 16:00) (93% - 100%)  Wt(kg): --    ----  I&O's Summary    12 Aug 2019 07:01  -  13 Aug 2019 07:00  --------------------------------------------------------  IN: 600 mL / OUT: 2025 mL / NET: -1425 mL    13 Aug 2019 07:01  -  13 Aug 2019 16:50  --------------------------------------------------------  IN: 830 mL / OUT: 400 mL / NET: 430 mL      LABS:                        11.8   10.01 )-----------( 265      ( 13 Aug 2019 05:08 )             38.0     08-13    144  |  106  |  20  ----------------------------<  205<H>  4.6   |  33<H>  |  1.10    Ca    9.6      13 Aug 2019 05:08  Phos  2.5     08-13  Mg     2.1     08-13    TPro  6.2  /  Alb  3.2<L>  /  TBili  0.3  /  DBili  x   /  AST  12<L>  /  ALT  29  /  AlkPhos  73  08-13      CAPILLARY BLOOD GLUCOSE  POCT Blood Glucose.: 247 mg/dL (13 Aug 2019 12:13)  POCT Blood Glucose.: 190 mg/dL (13 Aug 2019 08:28)  POCT Blood Glucose.: 279 mg/dL (12 Aug 2019 22:12)  POCT Blood Glucose.: 230 mg/dL (12 Aug 2019 17:51)    ABG - ( 12 Aug 2019 10:37 )  pH, Arterial: 7.28  pH, Blood: x     /  pCO2: 60    /  pO2: 87    / HCO3: 24    / Base Excess: 1.3   /  SaO2: 96          08-12 @ 09:02   Growth in anaerobic bottle: Gram Positive Cocci in Clusters  "Due to technical problems, Proteus sp. will Not be reported as part of  the BCID panel until further notice"  ***Blood Panel PCR results on this specimen are available  approximately 3 hours after the Gram stain result.***  Gram stain, PCR, and/or culture results may not always  correspond due to difference in methodologies.  ************************************************************  This PCR assay was performed using [employer].  The following targets are tested for: Enterococcus,  vancomycin resistant enterococci, Listeria monocytogenes,  coagulase negative staphylococci, S. aureus,  methicillin resistant S. aureus, Streptococcus agalactiae  (Group B), S. pneumoniae, S. pyogenes (Group A),  Acinetobacter baumannii, Enterobacter cloacae, E. coli,  Klebsiella oxytoca, K. pneumoniae, Proteus sp.,  Serratia marcescens, Haemophilus influenzae,  Neisseria meningitidis, Pseudomonas aeruginosa, Candida  albicans, C. glabrata, C krusei, C parapsilosis,  C. tropicalis and the KPC resistance gene.  --  Blood Culture PCR      ----  Personally reviewed:  Vital sign trends: [ x ] yes    [  ] no     [  ] n/a  Laboratory results: [ x ] yes    [  ] no     [  ] n/a  Radiology results: [  ] yes    [  ] no     [ x ] n/a  Culture results: [ x ] yes    [  ] no     [  ] n/a  Consultant recommendations: [ x ] yes    [  ] no     [  ] n/a

## 2019-08-13 NOTE — DIETITIAN INITIAL EVALUATION ADULT. - PROBLEM SELECTOR PLAN 7
DVT prophylaxis with lovenox    IMPROVE VTE Individual Risk Assessment  RISK                                                                Points  [  ] Previous VTE                                                  3  [  ] Thrombophilia                                               2  [  ] Lower limb paralysis                                      2        (unable to hold up >15 seconds)    [  ] Current Cancer                                              2         (within 6 months)  [ x ] Immobilization > 24 hrs (expected while on bipap)      1  [  ] ICU/CCU stay > 24 hours                              1  [ x ] Age > 60                                                      1  IMPROVE VTE Score ____2_____    IMPROVE Score 0-1: Low Risk, No VTE prophylaxis required for most patients, encourage ambulation.   IMPROVE Score 2-3: At risk, pharmacologic VTE prophylaxis is indicated for most patients (in the absence of a contraindication)  IMPROVE Score > or = 4: High Risk, pharmacologic VTE prophylaxis is indicated for most patients (in the absence of a contraindication)

## 2019-08-13 NOTE — PROGRESS NOTE ADULT - PROBLEM SELECTOR PLAN 4
Chronic, stable  -Continue home meds -metoprolol tartrate 12.5mg q12h and valsartan 80mg daily with hold parameters  -monitor routine hemodynamics -Chronic, stable  -Home meds include metformin and glipizide  -Will place patient on moderate dose insulin sliding scale (considering patient will be on steroids), accuchecks, and hypoglycemia protocol  -monitor blood glucose while on systemic steroids

## 2019-08-13 NOTE — PROGRESS NOTE ADULT - ASSESSMENT
PATIENT  COMMODORE YUE [facility] P [phone]  1940 DOA 2019                          Patient description                          79 m former smoker PMH HTN, DM2 (on home Metformin and glipizide), COPD (2L home/ BIPAP at night), CAD with 3v CABG , HLD, 10/26/2018 ECHO ef 55% hx hypercapnic resp failure with ho intubation c/b with cardiac arrest (2018) with ho recurrent admissions GOLD D admitted 2019 with progressive sob No sick contacts No fever No travel                                                              PATIENT  COMMODORE YUE [facility] P [phone]  1940 DOA 2019                            PROBLEM/ASSESSMENT/RECOMMENDATIONS (A/R)       AOC HYPERCAPNIC RESP FAILURE   2019 10a 18/6/.3 728/60/87   2019 7a bpap 18/6/.3 719/81/88  2019 5a  bpap 18/6/.3 722/72/101   A/R Gas exchange improved with bpap following his usual post-hospitalization course seen previously   Permissive hypercapnia strategy to avoid autopeep     COPD ex    COPD (2L home/ BIPAP at night)   Has COPD GOLD D with ac exacerbation   A/R On BD azithro steroids    2019 Pt should be restarted on solumed He takes pred 10 eod at home     RESP TRACT INFECTION   2019 pc n  2019 flu ab n rsv n rvp n   2019    A/R Bacterial infection unlikely    GP BACTEREMIA    blod culture gpc clusters  A/R Likely contaminant Await id ro Stapha     RO VTE   2019 V duplex n  A/R VTE less likely    RO MI   PAST H CAD with 3v CABG 2004  10/26/2018 ECHO ef 55%   A/R No obvious ongoing ischemia                   TIME SPENT Over 25 minutes aggregate care time spent on encounter; activities included   direct pt care, counseling and/or coordinating care reviewing notes, lab data/ imaging , discussion with multidisciplinary team/ pt /family. Risks, benefits, alternatives  discussed in detail.

## 2019-08-14 ENCOUNTER — TRANSCRIPTION ENCOUNTER (OUTPATIENT)
Age: 79
End: 2019-08-14

## 2019-08-14 VITALS
DIASTOLIC BLOOD PRESSURE: 70 MMHG | HEART RATE: 90 BPM | SYSTOLIC BLOOD PRESSURE: 134 MMHG | RESPIRATION RATE: 17 BRPM | OXYGEN SATURATION: 98 %

## 2019-08-14 LAB
ANION GAP SERPL CALC-SCNC: 3 MMOL/L — LOW (ref 5–17)
BASOPHILS # BLD AUTO: 0.02 K/UL — SIGNIFICANT CHANGE UP (ref 0–0.2)
BASOPHILS NFR BLD AUTO: 0.2 % — SIGNIFICANT CHANGE UP (ref 0–2)
BUN SERPL-MCNC: 22 MG/DL — SIGNIFICANT CHANGE UP (ref 7–23)
CALCIUM SERPL-MCNC: 9.5 MG/DL — SIGNIFICANT CHANGE UP (ref 8.5–10.1)
CHLORIDE SERPL-SCNC: 105 MMOL/L — SIGNIFICANT CHANGE UP (ref 96–108)
CO2 SERPL-SCNC: 35 MMOL/L — HIGH (ref 22–31)
CREAT SERPL-MCNC: 1.1 MG/DL — SIGNIFICANT CHANGE UP (ref 0.5–1.3)
CULTURE RESULTS: SIGNIFICANT CHANGE UP
EOSINOPHIL # BLD AUTO: 0.1 K/UL — SIGNIFICANT CHANGE UP (ref 0–0.5)
EOSINOPHIL NFR BLD AUTO: 0.9 % — SIGNIFICANT CHANGE UP (ref 0–6)
GLUCOSE SERPL-MCNC: 169 MG/DL — HIGH (ref 70–99)
HCT VFR BLD CALC: 37.6 % — LOW (ref 39–50)
HGB BLD-MCNC: 11.6 G/DL — LOW (ref 13–17)
IMM GRANULOCYTES NFR BLD AUTO: 0.4 % — SIGNIFICANT CHANGE UP (ref 0–1.5)
LYMPHOCYTES # BLD AUTO: 1.82 K/UL — SIGNIFICANT CHANGE UP (ref 1–3.3)
LYMPHOCYTES # BLD AUTO: 16.5 % — SIGNIFICANT CHANGE UP (ref 13–44)
MAGNESIUM SERPL-MCNC: 2.1 MG/DL — SIGNIFICANT CHANGE UP (ref 1.6–2.6)
MCHC RBC-ENTMCNC: 27.7 PG — SIGNIFICANT CHANGE UP (ref 27–34)
MCHC RBC-ENTMCNC: 30.9 GM/DL — LOW (ref 32–36)
MCV RBC AUTO: 89.7 FL — SIGNIFICANT CHANGE UP (ref 80–100)
MONOCYTES # BLD AUTO: 0.92 K/UL — HIGH (ref 0–0.9)
MONOCYTES NFR BLD AUTO: 8.4 % — SIGNIFICANT CHANGE UP (ref 2–14)
NEUTROPHILS # BLD AUTO: 8.11 K/UL — HIGH (ref 1.8–7.4)
NEUTROPHILS NFR BLD AUTO: 73.6 % — SIGNIFICANT CHANGE UP (ref 43–77)
NRBC # BLD: 0 /100 WBCS — SIGNIFICANT CHANGE UP (ref 0–0)
ORGANISM # SPEC MICROSCOPIC CNT: SIGNIFICANT CHANGE UP
ORGANISM # SPEC MICROSCOPIC CNT: SIGNIFICANT CHANGE UP
PHOSPHATE SERPL-MCNC: 2.8 MG/DL — SIGNIFICANT CHANGE UP (ref 2.5–4.5)
PLATELET # BLD AUTO: 252 K/UL — SIGNIFICANT CHANGE UP (ref 150–400)
POTASSIUM SERPL-MCNC: 3.8 MMOL/L — SIGNIFICANT CHANGE UP (ref 3.5–5.3)
POTASSIUM SERPL-SCNC: 3.8 MMOL/L — SIGNIFICANT CHANGE UP (ref 3.5–5.3)
RBC # BLD: 4.19 M/UL — LOW (ref 4.2–5.8)
RBC # FLD: 13.5 % — SIGNIFICANT CHANGE UP (ref 10.3–14.5)
SODIUM SERPL-SCNC: 143 MMOL/L — SIGNIFICANT CHANGE UP (ref 135–145)
SPECIMEN SOURCE: SIGNIFICANT CHANGE UP
WBC # BLD: 11.01 K/UL — HIGH (ref 3.8–10.5)
WBC # FLD AUTO: 11.01 K/UL — HIGH (ref 3.8–10.5)

## 2019-08-14 PROCEDURE — 87798 DETECT AGENT NOS DNA AMP: CPT

## 2019-08-14 PROCEDURE — 99239 HOSP IP/OBS DSCHRG MGMT >30: CPT | Mod: GC

## 2019-08-14 PROCEDURE — 93970 EXTREMITY STUDY: CPT

## 2019-08-14 PROCEDURE — 84145 PROCALCITONIN (PCT): CPT

## 2019-08-14 PROCEDURE — 96374 THER/PROPH/DIAG INJ IV PUSH: CPT

## 2019-08-14 PROCEDURE — 94760 N-INVAS EAR/PLS OXIMETRY 1: CPT

## 2019-08-14 PROCEDURE — 99291 CRITICAL CARE FIRST HOUR: CPT | Mod: 25

## 2019-08-14 PROCEDURE — 83880 ASSAY OF NATRIURETIC PEPTIDE: CPT

## 2019-08-14 PROCEDURE — 94660 CPAP INITIATION&MGMT: CPT

## 2019-08-14 PROCEDURE — 87486 CHLMYD PNEUM DNA AMP PROBE: CPT

## 2019-08-14 PROCEDURE — 83605 ASSAY OF LACTIC ACID: CPT

## 2019-08-14 PROCEDURE — 87581 M.PNEUMON DNA AMP PROBE: CPT

## 2019-08-14 PROCEDURE — 82803 BLOOD GASES ANY COMBINATION: CPT

## 2019-08-14 PROCEDURE — 97161 PT EVAL LOW COMPLEX 20 MIN: CPT

## 2019-08-14 PROCEDURE — 82962 GLUCOSE BLOOD TEST: CPT

## 2019-08-14 PROCEDURE — 36415 COLL VENOUS BLD VENIPUNCTURE: CPT

## 2019-08-14 PROCEDURE — 80053 COMPREHEN METABOLIC PANEL: CPT

## 2019-08-14 PROCEDURE — 83036 HEMOGLOBIN GLYCOSYLATED A1C: CPT

## 2019-08-14 PROCEDURE — 94664 DEMO&/EVAL PT USE INHALER: CPT

## 2019-08-14 PROCEDURE — 99221 1ST HOSP IP/OBS SF/LOW 40: CPT

## 2019-08-14 PROCEDURE — 87150 DNA/RNA AMPLIFIED PROBE: CPT

## 2019-08-14 PROCEDURE — 71045 X-RAY EXAM CHEST 1 VIEW: CPT

## 2019-08-14 PROCEDURE — 87631 RESP VIRUS 3-5 TARGETS: CPT

## 2019-08-14 PROCEDURE — 93005 ELECTROCARDIOGRAM TRACING: CPT

## 2019-08-14 PROCEDURE — 36600 WITHDRAWAL OF ARTERIAL BLOOD: CPT

## 2019-08-14 PROCEDURE — 84100 ASSAY OF PHOSPHORUS: CPT

## 2019-08-14 PROCEDURE — 80048 BASIC METABOLIC PNL TOTAL CA: CPT

## 2019-08-14 PROCEDURE — 83735 ASSAY OF MAGNESIUM: CPT

## 2019-08-14 PROCEDURE — 94640 AIRWAY INHALATION TREATMENT: CPT

## 2019-08-14 PROCEDURE — 87040 BLOOD CULTURE FOR BACTERIA: CPT

## 2019-08-14 PROCEDURE — 87633 RESP VIRUS 12-25 TARGETS: CPT

## 2019-08-14 PROCEDURE — 85027 COMPLETE CBC AUTOMATED: CPT

## 2019-08-14 PROCEDURE — 87186 SC STD MICRODIL/AGAR DIL: CPT

## 2019-08-14 RX ORDER — AZITHROMYCIN 500 MG/1
1 TABLET, FILM COATED ORAL
Qty: 3 | Refills: 0
Start: 2019-08-14

## 2019-08-14 RX ADMIN — Medication 3 MILLILITER(S): at 13:29

## 2019-08-14 RX ADMIN — Medication 4: at 12:28

## 2019-08-14 RX ADMIN — Medication 30 MILLIGRAM(S): at 10:13

## 2019-08-14 RX ADMIN — Medication 3 MILLILITER(S): at 07:31

## 2019-08-14 RX ADMIN — Medication 12.5 MILLIGRAM(S): at 17:45

## 2019-08-14 RX ADMIN — VALSARTAN 80 MILLIGRAM(S): 80 TABLET ORAL at 06:12

## 2019-08-14 RX ADMIN — Medication 12.5 MILLIGRAM(S): at 06:12

## 2019-08-14 RX ADMIN — Medication 3 MILLILITER(S): at 03:00

## 2019-08-14 RX ADMIN — Medication 4: at 17:43

## 2019-08-14 RX ADMIN — CLOPIDOGREL BISULFATE 75 MILLIGRAM(S): 75 TABLET, FILM COATED ORAL at 12:22

## 2019-08-14 RX ADMIN — CHLORHEXIDINE GLUCONATE 1 APPLICATION(S): 213 SOLUTION TOPICAL at 06:13

## 2019-08-14 RX ADMIN — Medication 1 TABLET(S): at 12:21

## 2019-08-14 RX ADMIN — ENOXAPARIN SODIUM 40 MILLIGRAM(S): 100 INJECTION SUBCUTANEOUS at 12:23

## 2019-08-14 RX ADMIN — AZITHROMYCIN 500 MILLIGRAM(S): 500 TABLET, FILM COATED ORAL at 12:24

## 2019-08-14 RX ADMIN — Medication 81 MILLIGRAM(S): at 12:22

## 2019-08-14 RX ADMIN — Medication 0.5 MILLIGRAM(S): at 07:31

## 2019-08-14 NOTE — PHYSICAL THERAPY INITIAL EVALUATION ADULT - IMPAIRMENTS FOUND, PT EVAL
gait, locomotion, and balance/joint integrity and mobility/muscle strength/posture/gross motor/aerobic capacity/endurance/ROM

## 2019-08-14 NOTE — PHYSICAL THERAPY INITIAL EVALUATION ADULT - GENERAL OBSERVATIONS, REHAB EVAL
Pt received supine in bed, wife present, Ax0x4, NAD, no c/o, on 2L 02 via NC, agreeable to PT evaluation

## 2019-08-14 NOTE — PROGRESS NOTE ADULT - PROBLEM SELECTOR PLAN 1
-Downgraded from ICU to med floor on 8/13  -ABG with respiratory acidosis on 8/12, will continue BiPAP at night and nasal cannula during the day  -s/p 125mg solumedrol and duoneb x1 in ED  -Oral prednisone 30mg today, and taper thereafter  -Continue PO Azithromycin (Day 3 of 5)  -Continue Duoneb q6h standing, and elevated head of bed  - Leukocytosis likely secondary to steroid usage   - Blood cultures from 8/12 grew gram positive cocci in clusters, likely contaminant  - CXR: Hyperinflated lungs. No active infiltrates.  - Pulm Dr. Dejesus following

## 2019-08-14 NOTE — PHYSICAL THERAPY INITIAL EVALUATION ADULT - DID THE PATIENT HAVE SURGERY?
n/a/CXR - hyperinflated lungs. No active infiltrates. S/p median sternotomy. Normal heart mediastinum. No consolidation or effusion. ; US Duplex Venous LE - no evidence of DVT in either LE.

## 2019-08-14 NOTE — PHYSICAL THERAPY INITIAL EVALUATION ADULT - GAIT DEVIATIONS NOTED, PT EVAL
decreased velocity of limb motion/decreased ara/decreased step length/decreased weight-shifting ability/decreased stride length/Limited 2/2 hx of COPD

## 2019-08-14 NOTE — PROGRESS NOTE ADULT - PROBLEM SELECTOR PLAN 3
gram positive cocci in clusters - like CoNS and probably contaminant. may repeat blood culture pending clinical course  - no indication for gram positive targeted abx regimen at this time

## 2019-08-14 NOTE — PROGRESS NOTE ADULT - ASSESSMENT
PATIENT  COMMODORE YUE [facility] P [phone]  1940 DOA 2019                          Patient description                          79 m former smoker PMH HTN, DM2 (on home Metformin and glipizide), COPD (2L home/ BIPAP at night), CAD with 3v CABG , HLD, 10/26/2018 ECHO ef 55% hx hypercapnic resp failure with ho intubation c/b with cardiac arrest (2018) with ho recurrent admissions GOLD D admitted 2019 with progressive sob No sick contacts No fever No travel            PATIENT  COMMODORE YUE [facility] P [phone]  1940 DOA 2019                            PROBLEM/ASSESSMENT/RECOMMENDATIONS (A/R)       AOC HYPERCAPNIC RESP FAILURE   2019 10a 18/6/.3 728/60/87   2019 7a bpap 18/6/.3 719/81/88  2019 5a  bpap 18/6/.3 722/72/101   A/R Gas exchange improved with bpap following his usual post-hospitalization course seen previously   Permissive hypercapnia strategy to avoid autopeep     COPD ex    COPD (2L home/ BIPAP at night)   Has COPD GOLD D with ac exacerbation   A/R On BD azithro steroids    2019 Pt should be restarted on solumed He takes pred 10 eod at home     RESP TRACT INFECTION   2019 pc n  2019 flu ab n rsv n rvp n   2019    A/R Bacterial infection unlikely    GP BACTEREMIA    blod culture gpc clusters  A/R Likely contaminant Await id ro Stapha     RO VTE   2019 V duplex n  A/R VTE less likely    RO MI   PAST H CAD with 3v CABG 2004  10/26/2018 ECHO ef 55%   A/R No obvious ongoing ischemia       TIME SPENT Over 25 minutes aggregate care time spent on encounter; activities included   direct pt care, counseling and/or coordinating care reviewing notes, lab data/ imaging , discussion with multidisciplinary team/ pt /family. Risks, benefits, alternatives  discussed in detail.

## 2019-08-14 NOTE — PROGRESS NOTE ADULT - SUBJECTIVE AND OBJECTIVE BOX
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 Per his wife, the patient was feeling well up until this afternoon when he started to feel SOB. His oxygen saturation continued to worsen throughout the day without any improvement following the Bipap. He denies any fever, chills, chest pain. abdominal pain and. He denies any sick contacts He is currently compliant with all his home meds. Patient states he feels much improved in the ED on BIPAP.     Of note, patient recently admitted on 7/22 at [facility] for COPD exacerbation.    In the ED, T 96.9, , /95 --> 146/83, RR 32 SpO2 100% on 6L NC then placed on BiPAP.  Labs significant for WBC 15.65, POCT 169, proBNP 134, ABG showed pH 7.22 pCO2 72 on BiPAP.  Patient received solumedrol 125mg IV x1 and duoneb x1 in the ED.    EKG: sinus tachycardia (personally reviewed)  CXR: hyperinflated lungs, flattened diaphragm, no focal consolidation (personally reviewed) (12 Aug 2019 05:56)    ----------------------    INTERVAL HPI: Pt seen and evaluated at the bedside. No acute overnight events occurred. Patient reports he is feeling much better today, got out of bed. Patient breathing comfortably on 2L O2 nasal cannula. Patient denies SOB, chest pain at this time.    ----------------------    REVIEW OF SYSTEMS:    CONSTITUTIONAL: No weakness, fevers or chills  EYES/ENT: No visual changes, no throat pain   RESPIRATORY: No cough, wheezing, hemoptysis; No shortness of breath  CARDIOVASCULAR: No chest pain or palpitations  GASTROINTESTINAL: No abdominal, nausea, vomiting, or hematemesis; No diarrhea or constipation. No melena or hematochezia.  GENITOURINARY: No dysuria, frequency or hematuria  NEUROLOGICAL: No dizziness, numbness, or weakness  SKIN: No itching, burning, rashes, or lesions   All other review of systems is negative unless indicated above.    VITAL SIGNS:  Vital Signs Last 24 Hrs  T(C): 36.6 (13 Aug 2019 23:47), Max: 36.8 (13 Aug 2019 19:38)  T(F): 97.8 (13 Aug 2019 23:47), Max: 98.3 (13 Aug 2019 19:38)  HR: 84 (14 Aug 2019 07:35) (71 - 114)  BP: 118/69 (14 Aug 2019 06:00) (115/65 - 172/100)  BP(mean): 88 (14 Aug 2019 06:00) (85 - 129)  RR: 17 (14 Aug 2019 06:00) (15 - 36)  SpO2: 98% (14 Aug 2019 07:35) (92% - 100%)      PHYSICAL EXAM:     GENERAL: no acute distress  HEENT: NC/AT, EOMI, neck supple, MMM  RESPIRATORY: CTA bilaterally, no rhonchi, rales, or wheezing.  CARDIOVASCULAR: RRR, no murmurs, gallops, rubs  ABDOMINAL: soft, non-tender, non-distended, positive bowel sounds   EXTREMITIES: no clubbing, cyanosis, or edema  NEUROLOGICAL: alert and oriented x 3, non-focal  SKIN: no rashes or lesions   MUSCULOSKELETAL: no gross joint deformity                          11.6   11.01 )-----------( 252      ( 14 Aug 2019 05:08 )             37.6     08-14    143  |  105  |  22  ----------------------------<  169<H>  3.8   |  35<H>  |  1.10    Ca    9.5      14 Aug 2019 05:08  Phos  2.8     08-14  Mg     2.1     08-14    TPro  6.2  /  Alb  3.2<L>  /  TBili  0.3  /  DBili  x   /  AST  12<L>  /  ALT  29  /  AlkPhos  73  08-13      CAPILLARY BLOOD GLUCOSE      POCT Blood Glucose.: 137 mg/dL (14 Aug 2019 07:27)  POCT Blood Glucose.: 197 mg/dL (13 Aug 2019 22:11)  POCT Blood Glucose.: 251 mg/dL (13 Aug 2019 17:23)  POCT Blood Glucose.: 247 mg/dL (13 Aug 2019 12:13)      MEDICATIONS  (STANDING):  ALBUTerol/ipratropium for Nebulization 3 milliLiter(s) Nebulizer every 6 hours  aspirin enteric coated 81 milliGRAM(s) Oral daily  atorvastatin 40 milliGRAM(s) Oral at bedtime  azithromycin   Tablet 500 milliGRAM(s) Oral daily  buDESOnide    Inhalation Suspension 0.5 milliGRAM(s) Inhalation two times a day  chlorhexidine 2% Cloths 1 Application(s) Topical <User Schedule>  clopidogrel Tablet 75 milliGRAM(s) Oral daily  dextrose 5%. 1000 milliLiter(s) (50 mL/Hr) IV Continuous <Continuous>  dextrose 50% Injectable 12.5 Gram(s) IV Push once  dextrose 50% Injectable 25 Gram(s) IV Push once  dextrose 50% Injectable 25 Gram(s) IV Push once  enoxaparin Injectable 40 milliGRAM(s) SubCutaneous daily  insulin glargine Injectable (LANTUS) 20 Unit(s) SubCutaneous at bedtime  insulin lispro (HumaLOG) corrective regimen sliding scale   SubCutaneous Before meals and at bedtime  metoprolol tartrate 12.5 milliGRAM(s) Oral two times a day  multivitamin 1 Tablet(s) Oral daily  tamsulosin 0.4 milliGRAM(s) Oral at bedtime  valsartan 80 milliGRAM(s) Oral daily HPI:  79 year old M with PMH HTN, COPD (On home O2 2L and BIPAP at while sleeping regardless of time of day), CAD w/ 3v CABG, HLD, DM2 (on Metformin), 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 Per his wife, the patient was feeling well up until this afternoon when he started to feel SOB. His oxygen saturation continued to worsen throughout the day without any improvement following the Bipap. He denies any fever, chills, chest pain. abdominal pain and. He denies any sick contacts He is currently compliant with all his home meds. Patient states he feels much improved in the ED on BIPAP.     Of note, patient recently admitted on 7/22 at [facility] for COPD exacerbation.    In the ED, T 96.9, , /95 --> 146/83, RR 32 SpO2 100% on 6L NC then placed on BiPAP.  Labs significant for WBC 15.65, POCT 169, proBNP 134, ABG showed pH 7.22 pCO2 72 on BiPAP.  Patient received solumedrol 125mg IV x1 and duoneb x1 in the ED.    EKG: sinus tachycardia (personally reviewed)  CXR: hyperinflated lungs, flattened diaphragm, no focal consolidation (personally reviewed) (12 Aug 2019 05:56)    ----------------------    INTERVAL HPI: Pt seen and evaluated at the bedside. No acute overnight events occurred. Patient reports he is feeling much better today, got out of bed. Patient breathing comfortably on 2L O2 nasal cannula. Patient denies SOB, chest pain at this time.    ----------------------    REVIEW OF SYSTEMS:    CONSTITUTIONAL: No weakness, fevers or chills  EYES/ENT: No visual changes, no throat pain   RESPIRATORY: No cough, wheezing, hemoptysis; No shortness of breath  CARDIOVASCULAR: No chest pain or palpitations  GASTROINTESTINAL: No abdominal, nausea, vomiting, or hematemesis; No diarrhea or constipation. No melena or hematochezia.  GENITOURINARY: No dysuria, frequency or hematuria  NEUROLOGICAL: No dizziness, numbness, or weakness  SKIN: No itching, burning, rashes, or lesions   All other review of systems is negative unless indicated above.    VITAL SIGNS:  Vital Signs Last 24 Hrs  T(C): 36.6 (13 Aug 2019 23:47), Max: 36.8 (13 Aug 2019 19:38)  T(F): 97.8 (13 Aug 2019 23:47), Max: 98.3 (13 Aug 2019 19:38)  HR: 84 (14 Aug 2019 07:35) (71 - 114)  BP: 118/69 (14 Aug 2019 06:00) (115/65 - 172/100)  BP(mean): 88 (14 Aug 2019 06:00) (85 - 129)  RR: 17 (14 Aug 2019 06:00) (15 - 36)  SpO2: 98% (14 Aug 2019 07:35) (92% - 100%)      PHYSICAL EXAM:     GENERAL: no acute distress  HEENT: NC/AT, EOMI, neck supple, MMM  RESPIRATORY: Mild wheezing bilaterally, no rhonchi, no rales.  CARDIOVASCULAR: RRR, no murmurs, gallops, rubs  ABDOMINAL: soft, non-tender, non-distended, positive bowel sounds   EXTREMITIES: no clubbing, cyanosis, or edema  NEUROLOGICAL: alert and oriented x 3, non-focal  SKIN: no rashes or lesions   MUSCULOSKELETAL: no gross joint deformity                          11.6   11.01 )-----------( 252      ( 14 Aug 2019 05:08 )             37.6     08-14    143  |  105  |  22  ----------------------------<  169<H>  3.8   |  35<H>  |  1.10    Ca    9.5      14 Aug 2019 05:08  Phos  2.8     08-14  Mg     2.1     08-14    TPro  6.2  /  Alb  3.2<L>  /  TBili  0.3  /  DBili  x   /  AST  12<L>  /  ALT  29  /  AlkPhos  73  08-13      CAPILLARY BLOOD GLUCOSE      POCT Blood Glucose.: 137 mg/dL (14 Aug 2019 07:27)  POCT Blood Glucose.: 197 mg/dL (13 Aug 2019 22:11)  POCT Blood Glucose.: 251 mg/dL (13 Aug 2019 17:23)  POCT Blood Glucose.: 247 mg/dL (13 Aug 2019 12:13)      MEDICATIONS  (STANDING):  ALBUTerol/ipratropium for Nebulization 3 milliLiter(s) Nebulizer every 6 hours  aspirin enteric coated 81 milliGRAM(s) Oral daily  atorvastatin 40 milliGRAM(s) Oral at bedtime  azithromycin   Tablet 500 milliGRAM(s) Oral daily  buDESOnide    Inhalation Suspension 0.5 milliGRAM(s) Inhalation two times a day  chlorhexidine 2% Cloths 1 Application(s) Topical <User Schedule>  clopidogrel Tablet 75 milliGRAM(s) Oral daily  dextrose 5%. 1000 milliLiter(s) (50 mL/Hr) IV Continuous <Continuous>  dextrose 50% Injectable 12.5 Gram(s) IV Push once  dextrose 50% Injectable 25 Gram(s) IV Push once  dextrose 50% Injectable 25 Gram(s) IV Push once  enoxaparin Injectable 40 milliGRAM(s) SubCutaneous daily  insulin glargine Injectable (LANTUS) 20 Unit(s) SubCutaneous at bedtime  insulin lispro (HumaLOG) corrective regimen sliding scale   SubCutaneous Before meals and at bedtime  metoprolol tartrate 12.5 milliGRAM(s) Oral two times a day  multivitamin 1 Tablet(s) Oral daily  tamsulosin 0.4 milliGRAM(s) Oral at bedtime  valsartan 80 milliGRAM(s) Oral daily HPI:  79 year old M with PMH HTN, COPD (On home O2 2L and BIPAP at while sleeping regardless of time of day), CAD w/ 3v CABG, HLD, DM2 (on Metformin), 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 Per his wife, the patient was feeling well up until this afternoon when he started to feel SOB. His oxygen saturation continued to worsen throughout the day without any improvement following the Bipap. He denies any fever, chills, chest pain. abdominal pain and. He denies any sick contacts He is currently compliant with all his home meds. Patient states he feels much improved in the ED on BIPAP.     Of note, patient recently admitted on 7/22 at [facility] for COPD exacerbation.    In the ED, T 96.9, , /95 --> 146/83, RR 32 SpO2 100% on 6L NC then placed on BiPAP.  Labs significant for WBC 15.65, POCT 169, proBNP 134, ABG showed pH 7.22 pCO2 72 on BiPAP.  Patient received solumedrol 125mg IV x1 and duoneb x1 in the ED.    EKG: sinus tachycardia (personally reviewed)  CXR: hyperinflated lungs, flattened diaphragm, no focal consolidation (personally reviewed) (12 Aug 2019 05:56)    ----------------------    INTERVAL HPI: Pt seen and evaluated at the bedside. No acute overnight events occurred. Patient reports he is feeling much better today, got out of bed. Patient breathing comfortably on 2L O2 nasal cannula. Patient denies SOB, chest pain at this time.    ----------------------    REVIEW OF SYSTEMS:    CONSTITUTIONAL: No weakness, fevers or chills  EYES/ENT: No visual changes, no throat pain   RESPIRATORY: No cough, wheezing, hemoptysis; No shortness of breath  CARDIOVASCULAR: No chest pain or palpitations  GASTROINTESTINAL: No abdominal, nausea, vomiting, or hematemesis; No diarrhea or constipation. No melena or hematochezia.  GENITOURINARY: No dysuria, frequency or hematuria  NEUROLOGICAL: No dizziness, numbness, or weakness  SKIN: No itching, burning, rashes, or lesions   All other review of systems is negative unless indicated above.    VITAL SIGNS:  Vital Signs Last 24 Hrs  T(C): 36.6 (13 Aug 2019 23:47), Max: 36.8 (13 Aug 2019 19:38)  T(F): 97.8 (13 Aug 2019 23:47), Max: 98.3 (13 Aug 2019 19:38)  HR: 84 (14 Aug 2019 07:35) (71 - 114)  BP: 118/69 (14 Aug 2019 06:00) (115/65 - 172/100)  BP(mean): 88 (14 Aug 2019 06:00) (85 - 129)  RR: 17 (14 Aug 2019 06:00) (15 - 36)  SpO2: 98% (14 Aug 2019 07:35) (92% - 100%)      PHYSICAL EXAM:     GENERAL: no acute distress  HEENT: NC/AT, EOMI, neck supple, MMM  RESPIRATORY: scant wheezing left upper lung field, no rhonchi, no rales.  CARDIOVASCULAR: RRR, no murmurs, gallops, rubs  ABDOMINAL: soft, non-tender, non-distended, positive bowel sounds   EXTREMITIES: no clubbing, cyanosis, or edema  NEUROLOGICAL: alert and oriented x 3, non-focal  SKIN: no rashes or lesions   MUSCULOSKELETAL: no gross joint deformity                          11.6   11.01 )-----------( 252      ( 14 Aug 2019 05:08 )             37.6     08-14    143  |  105  |  22  ----------------------------<  169<H>  3.8   |  35<H>  |  1.10    Ca    9.5      14 Aug 2019 05:08  Phos  2.8     08-14  Mg     2.1     08-14    TPro  6.2  /  Alb  3.2<L>  /  TBili  0.3  /  DBili  x   /  AST  12<L>  /  ALT  29  /  AlkPhos  73  08-13      CAPILLARY BLOOD GLUCOSE      POCT Blood Glucose.: 137 mg/dL (14 Aug 2019 07:27)  POCT Blood Glucose.: 197 mg/dL (13 Aug 2019 22:11)  POCT Blood Glucose.: 251 mg/dL (13 Aug 2019 17:23)  POCT Blood Glucose.: 247 mg/dL (13 Aug 2019 12:13)      MEDICATIONS  (STANDING):  ALBUTerol/ipratropium for Nebulization 3 milliLiter(s) Nebulizer every 6 hours  aspirin enteric coated 81 milliGRAM(s) Oral daily  atorvastatin 40 milliGRAM(s) Oral at bedtime  azithromycin   Tablet 500 milliGRAM(s) Oral daily  buDESOnide    Inhalation Suspension 0.5 milliGRAM(s) Inhalation two times a day  chlorhexidine 2% Cloths 1 Application(s) Topical <User Schedule>  clopidogrel Tablet 75 milliGRAM(s) Oral daily  dextrose 5%. 1000 milliLiter(s) (50 mL/Hr) IV Continuous <Continuous>  dextrose 50% Injectable 12.5 Gram(s) IV Push once  dextrose 50% Injectable 25 Gram(s) IV Push once  dextrose 50% Injectable 25 Gram(s) IV Push once  enoxaparin Injectable 40 milliGRAM(s) SubCutaneous daily  insulin glargine Injectable (LANTUS) 20 Unit(s) SubCutaneous at bedtime  insulin lispro (HumaLOG) corrective regimen sliding scale   SubCutaneous Before meals and at bedtime  metoprolol tartrate 12.5 milliGRAM(s) Oral two times a day  multivitamin 1 Tablet(s) Oral daily  tamsulosin 0.4 milliGRAM(s) Oral at bedtime  valsartan 80 milliGRAM(s) Oral daily

## 2019-08-14 NOTE — DISCHARGE NOTE NURSING/CASE MANAGEMENT/SOCIAL WORK - NSDCPEEMAIL_GEN_ALL_CORE
[facility] for Tobacco Control email [email or web address]

## 2019-08-14 NOTE — DISCHARGE NOTE PROVIDER - CARE PROVIDER_API CALL
Sarah Avila)  Medicine  [address] [place] [address]  Phone: [phone]  Fax: [phone]  Follow Up Time:     Arun Dejesus)  Critical Care Medicine; Internal Medicine; Pulmonary Disease  [address] [place] [address]  Phone: [phone]  Fax: [phone]  Follow Up Time:

## 2019-08-14 NOTE — PROGRESS NOTE ADULT - PROBLEM SELECTOR PLAN 6
Chronic, stable, hx of CABG  -Continue home ASA 81mg, Plavix 75mg daily  -Continue atorvastatin 40mg daily

## 2019-08-14 NOTE — PROGRESS NOTE ADULT - PROBLEM SELECTOR PLAN 8
DVT prophylaxis with Lovenox 40mg subQ qd    IMPROVE VTE Individual Risk Assessment  RISK                                                                Points  [  ] Previous VTE                                                  3  [  ] Thrombophilia                                               2  [  ] Lower limb paralysis                                      2        (unable to hold up >15 seconds)    [  ] Current Cancer                                              2         (within 6 months)  [  ] Immobilization > 24 hrs     1  [  ] ICU/CCU stay > 24 hours                              1  [ x ] Age > 60                                                      1  IMPROVE VTE Score ____1_____
DVT prophylaxis with lovenox    IMPROVE VTE Individual Risk Assessment  RISK                                                                Points  [  ] Previous VTE                                                  3  [  ] Thrombophilia                                               2  [  ] Lower limb paralysis                                      2        (unable to hold up >15 seconds)    [  ] Current Cancer                                              2         (within 6 months)  [  ] Immobilization > 24 hrs     1  [  ] ICU/CCU stay > 24 hours                              1  [ x ] Age > 60                                                      1  IMPROVE VTE Score ____1_____  DVT ppx with lovenox 40 subQ qd

## 2019-08-14 NOTE — PHYSICAL THERAPY INITIAL EVALUATION ADULT - PERTINENT HX OF CURRENT PROBLEM, REHAB EVAL
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 Per his wife, the patient was feeling well up until this afternoon when he started to feel SOB. His oxygen saturation continued to worsen throughout the day without any improvement following the Bipap"

## 2019-08-14 NOTE — PROGRESS NOTE ADULT - PROBLEM SELECTOR PLAN 5
Chronic, stable  -Continue home meds, metoprolol tartrate 12.5mg q12h and valsartan 80mg daily with hold parameters  -Monitor routine hemodynamics

## 2019-08-14 NOTE — PROGRESS NOTE ADULT - PROBLEM SELECTOR PLAN 4
Chronic, stable  -A1C 7.0  -Home meds metformin and glipizide  -Will place patient on moderate dose insulin sliding scale (considering patient will be on steroids), Accu-Chek, and hypoglycemia protocol

## 2019-08-14 NOTE — PHYSICAL THERAPY INITIAL EVALUATION ADULT - PRECAUTIONS/LIMITATIONS, REHAB EVAL
oxygen therapy device and L/min/no known precautions/limitations/fall precautions/Supplemental 02 at home

## 2019-08-14 NOTE — DISCHARGE NOTE PROVIDER - NSDCCPCAREPLAN_GEN_ALL_CORE_FT
PRINCIPAL DISCHARGE DIAGNOSIS  Diagnosis: COPD exacerbation  Assessment and Plan of Treatment: complete course of steroids and azithromycin.  Follow up with Pulmonary in 1 week.      SECONDARY DISCHARGE DIAGNOSES  Diagnosis: Diabetes mellitus, type II  Assessment and Plan of Treatment: continue home oral hypoglycemic medications    Diagnosis: CAD (Coronary Artery Disease)  Assessment and Plan of Treatment: continue your cardiac maintenance regimen.

## 2019-08-14 NOTE — PROGRESS NOTE ADULT - ATTENDING COMMENTS
78 y/o male with hx COPD (on home O2 and qhs BiPAP), HTN, HLD, CAD s/p CABG x 3, DM II, cardiac arrest, admitted with acute hypercapnic respiratory failure due to COPD exacerbation, unknown trigger.     Improving     Recs:   Neuro - AAOx3, follows commands   CV - HD stable, continue aspirin, Plavix, metoprolol, valsartan, atorvastatin  Resp - change Solumedrol to prednisone 80mg daily, taper to 20mg daily over the next few days, continue azithromycin, budesonide, DuoNebs, BiPAP qhs and prn, NC O2 during the day   GI - tolerating po diet   Renal - fn stable  ID - RVP neg, no other evidence of infection    Endo - Humalog sliding scale   PPx - Lovenox   Prognosis guarded     Stable for medicine
Pt. seen and evaluated for acute hypercapnic respiratory failure and acute copd exacerbation.  Pt. reports feeling better and back to his baseline respiratory status.  Eager to be discharged home.  Pt. tolerating oral antibiotic and steroids.  Remains afebrile and hemodynamically stable.  Physical examination as above.     Plan:  -Acute hypercapnic respiratory failure/Acute COPD exacerbation:  continue Azithromycin 500mg PO daily, Prednisone 30mg PO daily, Pulmicort inh BID, and Duoneb tx Q6h.  O2 support via nasal canula.  Pulmonary f/u  -Coag negative staph in blood cx:  Contaminant sample.  Pt. clinically improving without targeted antibiotics.  Remains afebrile and hemodynamically stable.   -HTN:  continue metoprolol 12.5mg PO bID and Diovan 80mg PO daily  -HLD:  continue statin therapy  -Type 2 DM:  continue Lantus 20 units SQ QHS and humalog sliding scale  -CAD:  continue ASA 81mg PO daily, Plavix 75mg PO daily, Metoprolol 12.5mg PO BID, and Lipitor 40mg PO QHS  -VTE ppx:  Lovenox 40mg SQ daily
I personally conducted a physical examination of the patient. I personally gathered the patient's history. I edited the above listed findings which were prepared by the listed resident physician. I personally discussed the plan of care with the patient. The questions and concerns were addressed to the best of my ability. The patient is in agreement with the listed treatment plan.     - pt improving slowly. downgrade to medicine floor w/ remote tele/pulse ox. steroids adjusted. consider repeat blood cultures pending final sensitivities.

## 2019-08-14 NOTE — DISCHARGE NOTE PROVIDER - HOSPITAL COURSE
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 Per his wife, the patient was feeling well up until this afternoon when he started to feel SOB. His oxygen saturation continued to worsen throughout the day without any improvement following the Bipap. He denies any fever, chills, chest pain. abdominal pain and. He denies any sick contacts He is currently compliant with all his home meds. Patient states he feels much improved in the ED on BIPAP.         Of note, patient recently admitted on 7/22 at [facility] for COPD exacerbation.        In the ED, T 96.9, , /95 --> 146/83, RR 32 SpO2 100% on 6L NC then placed on BiPAP.  Labs significant for WBC 15.65, POCT 169, proBNP 134, ABG showed pH 7.22 pCO2 72 on BiPAP.  Patient received solumedrol 125mg IV x1 and duoneb x1 in the ED.        EKG: sinus tachycardia (personally reviewed)    CXR: hyperinflated lungs, flattened diaphragm, no focal consolidation        Pt. was admitted to ICU and given IV steroids and oral antibiotic.  LE dopplers were negative for DVT.  RVP was negative.  Blood cx grew coagulase negative staph which is believed to be a contaminant sample as patient has clinically improved without targeted antibiotics.  Procalcitonin on admission was negative.  Pt. improved and was then down graded from ICU.        On day of discharge patient maintaining SpO2 on 2L nasal canula.  Denies any SOB and ambulating without difficulty.  Pt. tolerating prednisone and Azithromycin.          Completion of discharge in 35 minutes. 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 Per his wife, the patient was feeling well up until this afternoon when he started to feel SOB. His oxygen saturation continued to worsen throughout the day without any improvement following the Bipap. He denies any fever, chills, chest pain. abdominal pain and. He denies any sick contacts He is currently compliant with all his home meds. Patient states he feels much improved in the ED on BIPAP.         Of note, patient recently admitted on 7/22 at [facility] for COPD exacerbation.        In the ED, T 96.9, , /95 --> 146/83, RR 32 SpO2 100% on 6L NC then placed on BiPAP.  Labs significant for WBC 15.65, POCT 169, proBNP 134, ABG showed pH 7.22 pCO2 72 on BiPAP.  Patient received solumedrol 125mg IV x1 and duoneb x1 in the ED.        EKG: sinus tachycardia (personally reviewed)    CXR: hyperinflated lungs, flattened diaphragm, no focal consolidation        Pt. was admitted to ICU and given IV steroids and oral antibiotic.  LE dopplers were negative for DVT.  RVP was negative.  Blood cx grew coagulase negative staph which is believed to be a contaminant sample as patient has clinically improved without targeted antibiotics.  Procalcitonin on admission was negative.  Pt. improved and was then down graded from ICU.        On day of discharge patient maintaining SpO2 on nasal canula.  Denies any SOB and ambulating without difficulty.  Pt. tolerating prednisone and Azithromycin.          Completion of discharge in 35 minutes.

## 2019-08-14 NOTE — DISCHARGE NOTE NURSING/CASE MANAGEMENT/SOCIAL WORK - NSDCPNINST_GEN_ALL_CORE
Patient A&Ox4, vs stable, no c/o pain, patient stable for discharged. Patient discharged to home this afternoon accompanied by spouse, patient declined home care, no distress noted at time of discharged.

## 2019-08-14 NOTE — PROGRESS NOTE ADULT - SUBJECTIVE AND OBJECTIVE BOX
Patient was examined Chart reviewed Detailed note will be inserted below after going over latest data Meanwhile please refer to my previous notes for Pulmonary/Critical Care assessment recommendations COMMODORE TURNER [facility] P [phone]  1940 DOA 2019  ALLERGY Shellfish  CONTACT Sp [facility]      Initial evaluation/Pulmonary Critical Care consultation requested on  2019  by Dr Staton   from Dr Dejesus   Patient examined chart reviewed    HOSPITAL ADMISSION   PATIENT CAME  FROM (if information available)        TYPE OF VISIT      Subsequent Pulmonary followup     REASON FOR VISIT  PLEASE SEE PROBLEM LIST/ASSESSMENTAND RECOMMENDATIONS     PATIENT COMMODALAN TURNER [facility] P [phone]  1940 DOA 2019    VITALS/LABS       2019 110 130/70 17 98%   2019 RA rest 91%  2019 Ambulation RA 84%   2019 Ambulation 2l 92%  2019 W 11.1 Hb 11.6 Plt 252 Na 143 K 3.8 CO2 35 cR 1.1

## 2019-08-14 NOTE — DISCHARGE NOTE PROVIDER - NSDCFUSCHEDAPPT_GEN_ALL_CORE_FT
YUE COTTO ; 09/11/2019 ; P PulmMed [address] ; 09/11/2019 ; NPP [address]Med [address] ; 09/23/2019 ; [facility] Surg [address]asc [address] ; 09/23/2019 ; NPP Surg [address]asc [address] ; 09/23/2019 ; P Surg Vasc [address] YUE COTTO ; 09/11/2019 ; P PulmMed [address] ; 09/11/2019 ; NPP PulmMed [address] ; 09/23/2019 ; [facility] Surg Vasc [address] ; 09/23/2019 ; NPP Surg [address]asc [address] ; 09/23/2019 ; P Surg Vasc [address]

## 2019-08-14 NOTE — PROGRESS NOTE ADULT - ASSESSMENT
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

## 2019-08-14 NOTE — PHYSICAL THERAPY INITIAL EVALUATION ADULT - CRITERIA FOR SKILLED THERAPEUTIC INTERVENTIONS
rehab potential/predicted duration of therapy intervention/anticipated equipment needs at discharge/impairments found/therapy frequency/anticipated discharge recommendation/functional limitations in following categories/risk reduction/prevention

## 2019-08-14 NOTE — DISCHARGE NOTE NURSING/CASE MANAGEMENT/SOCIAL WORK - NSDCPEWEB_GEN_ALL_CORE
[facility] website --- [email or web address] --- [email or web address]

## 2019-08-14 NOTE — PHYSICAL THERAPY INITIAL EVALUATION ADULT - TRANSFER SAFETY CONCERNS NOTED: SIT/STAND, REHAB EVAL
losing balance/decreased step length/decreased weight-shifting ability/decreased balance during turns/Limited 2/2 hx of COPD

## 2019-08-14 NOTE — PHYSICAL THERAPY INITIAL EVALUATION ADULT - PATIENT/FAMILY AGREES WITH PLAN
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 CT imaging and neurological exam as above.  - Team spoke to Dr. Zina Cuba regarding outpatient plans. Pt is to follow up with Dr. Cuba in 2 months for outpatient MRI. No chemo at this time. Pt declining HCPT/yes

## 2019-08-14 NOTE — PHYSICAL THERAPY INITIAL EVALUATION ADULT - DIAGNOSIS, PT EVAL
Decline in functional status 2/2 admitting diagnosis of COPD /c acute exacerbation impairing functional independence

## 2019-08-15 LAB
-  AMPICILLIN/SULBACTAM: SIGNIFICANT CHANGE UP
-  CEFAZOLIN: SIGNIFICANT CHANGE UP
-  CLINDAMYCIN: SIGNIFICANT CHANGE UP
-  ERYTHROMYCIN: SIGNIFICANT CHANGE UP
-  GENTAMICIN: SIGNIFICANT CHANGE UP
-  OXACILLIN: SIGNIFICANT CHANGE UP
-  PENICILLIN: SIGNIFICANT CHANGE UP
-  RIFAMPIN: SIGNIFICANT CHANGE UP
-  TETRACYCLINE: SIGNIFICANT CHANGE UP
-  TRIMETHOPRIM/SULFAMETHOXAZOLE: SIGNIFICANT CHANGE UP
-  VANCOMYCIN: SIGNIFICANT CHANGE UP
CULTURE RESULTS: SIGNIFICANT CHANGE UP
METHOD TYPE: SIGNIFICANT CHANGE UP
ORGANISM # SPEC MICROSCOPIC CNT: SIGNIFICANT CHANGE UP
ORGANISM # SPEC MICROSCOPIC CNT: SIGNIFICANT CHANGE UP
SPECIMEN SOURCE: SIGNIFICANT CHANGE UP

## 2019-08-23 ENCOUNTER — INPATIENT (INPATIENT)
Facility: HOSPITAL | Age: 79
LOS: 1 days | Discharge: HOME | DRG: 189 | End: 2019-08-25
Attending: FAMILY MEDICINE | Admitting: STUDENT IN AN ORGANIZED HEALTH CARE EDUCATION/TRAINING PROGRAM
Payer: COMMERCIAL

## 2019-08-23 VITALS
OXYGEN SATURATION: 100 % | SYSTOLIC BLOOD PRESSURE: 160 MMHG | RESPIRATION RATE: 32 BRPM | HEART RATE: 120 BPM | DIASTOLIC BLOOD PRESSURE: 90 MMHG

## 2019-08-23 DIAGNOSIS — J44.1 CHRONIC OBSTRUCTIVE PULMONARY DISEASE WITH (ACUTE) EXACERBATION: ICD-10-CM

## 2019-08-23 DIAGNOSIS — Z29.9 ENCOUNTER FOR PROPHYLACTIC MEASURES, UNSPECIFIED: ICD-10-CM

## 2019-08-23 DIAGNOSIS — I25.10 ATHEROSCLEROTIC HEART DISEASE OF NATIVE CORONARY ARTERY WITHOUT ANGINA PECTORIS: ICD-10-CM

## 2019-08-23 DIAGNOSIS — D72.829 ELEVATED WHITE BLOOD CELL COUNT, UNSPECIFIED: ICD-10-CM

## 2019-08-23 DIAGNOSIS — I10 ESSENTIAL (PRIMARY) HYPERTENSION: ICD-10-CM

## 2019-08-23 DIAGNOSIS — R00.0 TACHYCARDIA, UNSPECIFIED: ICD-10-CM

## 2019-08-23 DIAGNOSIS — T14.8XXA OTHER INJURY OF UNSPECIFIED BODY REGION, INITIAL ENCOUNTER: Chronic | ICD-10-CM

## 2019-08-23 DIAGNOSIS — E78.5 HYPERLIPIDEMIA, UNSPECIFIED: ICD-10-CM

## 2019-08-23 DIAGNOSIS — E11.9 TYPE 2 DIABETES MELLITUS WITHOUT COMPLICATIONS: ICD-10-CM

## 2019-08-23 DIAGNOSIS — I73.9 PERIPHERAL VASCULAR DISEASE, UNSPECIFIED: ICD-10-CM

## 2019-08-23 DIAGNOSIS — N40.0 BENIGN PROSTATIC HYPERPLASIA WITHOUT LOWER URINARY TRACT SYMPTOMS: ICD-10-CM

## 2019-08-23 LAB
ALBUMIN SERPL ELPH-MCNC: 4 G/DL — SIGNIFICANT CHANGE UP (ref 3.3–5)
ALP SERPL-CCNC: 86 U/L — SIGNIFICANT CHANGE UP (ref 40–120)
ALT FLD-CCNC: 40 U/L — SIGNIFICANT CHANGE UP (ref 12–78)
ANION GAP SERPL CALC-SCNC: 6 MMOL/L — SIGNIFICANT CHANGE UP (ref 5–17)
APTT BLD: 31.3 SEC — SIGNIFICANT CHANGE UP (ref 28.5–37)
AST SERPL-CCNC: 21 U/L — SIGNIFICANT CHANGE UP (ref 15–37)
BASE EXCESS BLDA CALC-SCNC: 4.7 MMOL/L — HIGH (ref -2–2)
BASOPHILS # BLD AUTO: 0.05 K/UL — SIGNIFICANT CHANGE UP (ref 0–0.2)
BASOPHILS NFR BLD AUTO: 0.4 % — SIGNIFICANT CHANGE UP (ref 0–2)
BILIRUB SERPL-MCNC: 0.3 MG/DL — SIGNIFICANT CHANGE UP (ref 0.2–1.2)
BLOOD GAS COMMENTS ARTERIAL: SIGNIFICANT CHANGE UP
BLOOD GAS COMMENTS ARTERIAL: SIGNIFICANT CHANGE UP
BUN SERPL-MCNC: 12 MG/DL — SIGNIFICANT CHANGE UP (ref 7–23)
CALCIUM SERPL-MCNC: 9.8 MG/DL — SIGNIFICANT CHANGE UP (ref 8.5–10.1)
CHLORIDE SERPL-SCNC: 106 MMOL/L — SIGNIFICANT CHANGE UP (ref 96–108)
CO2 SERPL-SCNC: 30 MMOL/L — SIGNIFICANT CHANGE UP (ref 22–31)
CREAT SERPL-MCNC: 1.1 MG/DL — SIGNIFICANT CHANGE UP (ref 0.5–1.3)
EOSINOPHIL # BLD AUTO: 0.56 K/UL — HIGH (ref 0–0.5)
EOSINOPHIL NFR BLD AUTO: 5 % — SIGNIFICANT CHANGE UP (ref 0–6)
GLUCOSE SERPL-MCNC: 110 MG/DL — HIGH (ref 70–99)
HCO3 BLDA-SCNC: 27 MMOL/L — SIGNIFICANT CHANGE UP (ref 23–27)
HCT VFR BLD CALC: 43.8 % — SIGNIFICANT CHANGE UP (ref 39–50)
HGB BLD-MCNC: 13.4 G/DL — SIGNIFICANT CHANGE UP (ref 13–17)
HOROWITZ INDEX BLDA+IHG-RTO: 31 — SIGNIFICANT CHANGE UP
IMM GRANULOCYTES NFR BLD AUTO: 0.9 % — SIGNIFICANT CHANGE UP (ref 0–1.5)
INR BLD: 0.93 RATIO — SIGNIFICANT CHANGE UP (ref 0.88–1.16)
LACTATE SERPL-SCNC: 1.6 MMOL/L — SIGNIFICANT CHANGE UP (ref 0.7–2)
LYMPHOCYTES # BLD AUTO: 2.7 K/UL — SIGNIFICANT CHANGE UP (ref 1–3.3)
LYMPHOCYTES # BLD AUTO: 24.3 % — SIGNIFICANT CHANGE UP (ref 13–44)
MCHC RBC-ENTMCNC: 27.9 PG — SIGNIFICANT CHANGE UP (ref 27–34)
MCHC RBC-ENTMCNC: 30.6 GM/DL — LOW (ref 32–36)
MCV RBC AUTO: 91.1 FL — SIGNIFICANT CHANGE UP (ref 80–100)
MONOCYTES # BLD AUTO: 1.22 K/UL — HIGH (ref 0–0.9)
MONOCYTES NFR BLD AUTO: 11 % — SIGNIFICANT CHANGE UP (ref 2–14)
NEUTROPHILS # BLD AUTO: 6.5 K/UL — SIGNIFICANT CHANGE UP (ref 1.8–7.4)
NEUTROPHILS NFR BLD AUTO: 58.4 % — SIGNIFICANT CHANGE UP (ref 43–77)
NRBC # BLD: 0 /100 WBCS — SIGNIFICANT CHANGE UP (ref 0–0)
NT-PROBNP SERPL-SCNC: 103 PG/ML — SIGNIFICANT CHANGE UP (ref 0–450)
PCO2 BLDA: 66 MMHG — HIGH (ref 32–46)
PH BLDA: 7.29 — LOW (ref 7.35–7.45)
PLATELET # BLD AUTO: 308 K/UL — SIGNIFICANT CHANGE UP (ref 150–400)
PO2 BLDA: 101 MMHG — SIGNIFICANT CHANGE UP (ref 74–108)
POTASSIUM SERPL-MCNC: 4.6 MMOL/L — SIGNIFICANT CHANGE UP (ref 3.5–5.3)
POTASSIUM SERPL-SCNC: 4.6 MMOL/L — SIGNIFICANT CHANGE UP (ref 3.5–5.3)
PROT SERPL-MCNC: 7.2 G/DL — SIGNIFICANT CHANGE UP (ref 6–8.3)
PROTHROM AB SERPL-ACNC: 10.6 SEC — SIGNIFICANT CHANGE UP (ref 10–12.9)
RBC # BLD: 4.81 M/UL — SIGNIFICANT CHANGE UP (ref 4.2–5.8)
RBC # FLD: 13.4 % — SIGNIFICANT CHANGE UP (ref 10.3–14.5)
SAO2 % BLDA: 97 % — HIGH (ref 92–96)
SODIUM SERPL-SCNC: 142 MMOL/L — SIGNIFICANT CHANGE UP (ref 135–145)
TROPONIN I SERPL-MCNC: <.015 NG/ML — SIGNIFICANT CHANGE UP (ref 0.01–0.04)
WBC # BLD: 11.13 K/UL — HIGH (ref 3.8–10.5)
WBC # FLD AUTO: 11.13 K/UL — HIGH (ref 3.8–10.5)

## 2019-08-23 PROCEDURE — 99285 EMERGENCY DEPT VISIT HI MDM: CPT

## 2019-08-23 PROCEDURE — 99223 1ST HOSP IP/OBS HIGH 75: CPT | Mod: GC

## 2019-08-23 PROCEDURE — 93010 ELECTROCARDIOGRAM REPORT: CPT

## 2019-08-23 PROCEDURE — 71045 X-RAY EXAM CHEST 1 VIEW: CPT | Mod: 26

## 2019-08-23 RX ORDER — AZITHROMYCIN 500 MG/1
500 TABLET, FILM COATED ORAL DAILY
Refills: 0 | Status: DISCONTINUED | OUTPATIENT
Start: 2019-08-23 | End: 2019-08-25

## 2019-08-23 RX ORDER — IPRATROPIUM/ALBUTEROL SULFATE 18-103MCG
3 AEROSOL WITH ADAPTER (GRAM) INHALATION EVERY 6 HOURS
Refills: 0 | Status: DISCONTINUED | OUTPATIENT
Start: 2019-08-23 | End: 2019-08-25

## 2019-08-23 RX ORDER — GLUCAGON INJECTION, SOLUTION 0.5 MG/.1ML
1 INJECTION, SOLUTION SUBCUTANEOUS ONCE
Refills: 0 | Status: DISCONTINUED | OUTPATIENT
Start: 2019-08-23 | End: 2019-08-25

## 2019-08-23 RX ORDER — TAMSULOSIN HYDROCHLORIDE 0.4 MG/1
0.4 CAPSULE ORAL AT BEDTIME
Refills: 0 | Status: DISCONTINUED | OUTPATIENT
Start: 2019-08-23 | End: 2019-08-25

## 2019-08-23 RX ORDER — SODIUM CHLORIDE 9 MG/ML
1000 INJECTION, SOLUTION INTRAVENOUS
Refills: 0 | Status: DISCONTINUED | OUTPATIENT
Start: 2019-08-23 | End: 2019-08-25

## 2019-08-23 RX ORDER — METOPROLOL TARTRATE 50 MG
0.5 TABLET ORAL
Qty: 30 | Refills: 0

## 2019-08-23 RX ORDER — DEXTROSE 50 % IN WATER 50 %
25 SYRINGE (ML) INTRAVENOUS ONCE
Refills: 0 | Status: DISCONTINUED | OUTPATIENT
Start: 2019-08-23 | End: 2019-08-25

## 2019-08-23 RX ORDER — DEXTROSE 50 % IN WATER 50 %
12.5 SYRINGE (ML) INTRAVENOUS ONCE
Refills: 0 | Status: DISCONTINUED | OUTPATIENT
Start: 2019-08-23 | End: 2019-08-25

## 2019-08-23 RX ORDER — VALSARTAN 80 MG/1
80 TABLET ORAL DAILY
Refills: 0 | Status: DISCONTINUED | OUTPATIENT
Start: 2019-08-23 | End: 2019-08-25

## 2019-08-23 RX ORDER — MAGNESIUM SULFATE 500 MG/ML
2 VIAL (ML) INJECTION ONCE
Refills: 0 | Status: COMPLETED | OUTPATIENT
Start: 2019-08-23 | End: 2019-08-23

## 2019-08-23 RX ORDER — INSULIN LISPRO 100/ML
VIAL (ML) SUBCUTANEOUS
Refills: 0 | Status: DISCONTINUED | OUTPATIENT
Start: 2019-08-23 | End: 2019-08-25

## 2019-08-23 RX ORDER — DEXTROSE 50 % IN WATER 50 %
15 SYRINGE (ML) INTRAVENOUS ONCE
Refills: 0 | Status: DISCONTINUED | OUTPATIENT
Start: 2019-08-23 | End: 2019-08-25

## 2019-08-23 RX ORDER — ATORVASTATIN CALCIUM 80 MG/1
40 TABLET, FILM COATED ORAL AT BEDTIME
Refills: 0 | Status: DISCONTINUED | OUTPATIENT
Start: 2019-08-23 | End: 2019-08-25

## 2019-08-23 RX ORDER — IPRATROPIUM/ALBUTEROL SULFATE 18-103MCG
3 AEROSOL WITH ADAPTER (GRAM) INHALATION ONCE
Refills: 0 | Status: COMPLETED | OUTPATIENT
Start: 2019-08-23 | End: 2019-08-23

## 2019-08-23 RX ORDER — CLOPIDOGREL BISULFATE 75 MG/1
75 TABLET, FILM COATED ORAL DAILY
Refills: 0 | Status: DISCONTINUED | OUTPATIENT
Start: 2019-08-23 | End: 2019-08-25

## 2019-08-23 RX ORDER — BUDESONIDE AND FORMOTEROL FUMARATE DIHYDRATE 160; 4.5 UG/1; UG/1
2 AEROSOL RESPIRATORY (INHALATION)
Refills: 0 | Status: DISCONTINUED | OUTPATIENT
Start: 2019-08-23 | End: 2019-08-24

## 2019-08-23 RX ORDER — METOPROLOL TARTRATE 50 MG
25 TABLET ORAL DAILY
Refills: 0 | Status: DISCONTINUED | OUTPATIENT
Start: 2019-08-23 | End: 2019-08-25

## 2019-08-23 RX ORDER — METOPROLOL TARTRATE 50 MG
0.5 TABLET ORAL
Qty: 0 | Refills: 0 | DISCHARGE

## 2019-08-23 RX ORDER — TIOTROPIUM BROMIDE 18 UG/1
1 CAPSULE ORAL; RESPIRATORY (INHALATION) DAILY
Refills: 0 | Status: DISCONTINUED | OUTPATIENT
Start: 2019-08-23 | End: 2019-08-25

## 2019-08-23 RX ORDER — METOPROLOL TARTRATE 50 MG
25 TABLET ORAL DAILY
Refills: 0 | Status: DISCONTINUED | OUTPATIENT
Start: 2019-08-23 | End: 2019-08-23

## 2019-08-23 RX ORDER — ENOXAPARIN SODIUM 100 MG/ML
40 INJECTION SUBCUTANEOUS DAILY
Refills: 0 | Status: DISCONTINUED | OUTPATIENT
Start: 2019-08-23 | End: 2019-08-25

## 2019-08-23 RX ADMIN — Medication 50 GRAM(S): at 20:49

## 2019-08-23 RX ADMIN — Medication 3 MILLILITER(S): at 20:44

## 2019-08-23 NOTE — H&P ADULT - NSHPOUTPATIENTPROVIDERS_GEN_ALL_CORE
PCP: Dr. Sarah Avila  Cardio: Dr. Rod  Pulm: Dr. Dejesus  Pharmacy: [facility] ([place], [place])

## 2019-08-23 NOTE — H&P ADULT - PROBLEM SELECTOR PLAN 7
Chronic, stable  -Continue home meds -metoprolol tartrate 12.5mg q12h and valsartan 80mg daily with hold parameters  -monitor routine hemodynamics. Chronic, stable  -Continue home meds -metoprolol tartrate 25mg PO daily and valsartan 80mg daily with hold parameters  -monitor routine hemodynamics.

## 2019-08-23 NOTE — H&P ADULT - PROBLEM SELECTOR PLAN 10
DVT prophylaxis with lovenox  IMPROVE VTE Individual Risk Assessment  RISK                                                                Points  [  ] Previous VTE                                                  3  [  ] Thrombophilia                                               2  [  ] Lower limb paralysis                                      2        (unable to hold up >15 seconds)    [  ] Current Cancer                                              2         (within 6 months)  [ x ] Immobilization > 24 hrs (expected while on bipap)      1  [  ] ICU/CCU stay > 24 hours                              1  [ x ] Age > 60                                                      1  IMPROVE VTE Score ____2_____

## 2019-08-23 NOTE — H&P ADULT - PROBLEM SELECTOR PLAN 5
Chronic, stable  -Continue home meds -metoprolol tartrate 12.5mg q12h and valsartan 80mg daily with hold parameters  -monitor routine hemodynamics. Chronic  -Home meds include metformin and glipizide  -Will place patient on moderate dose insulin sliding scale (considering patient will be on steroids), accuchecks, and hypoglycemia protocol  -monitor blood glucose while on systemic steroids. Chronic  -Home meds include metformin and glipizide--will hold   -Will place patient on moderate dose insulin sliding scale (considering patient will be on steroids), accuchecks, and hypoglycemia protocol  -monitor blood glucose while on systemic steroids.

## 2019-08-23 NOTE — ED ADULT NURSE NOTE - OBJECTIVE STATEMENT
Patient brought in by EMS c/o shortness of breath x 2 days. Patient arrived via EMS on CPAP was given Solumedrol 125mg IV once and Douneb once. Seen by Dr. Espinal and patient placed on BiPAP. Noted with g 20 on the left hand inserted by EMS. Blood drawn and sent to lab.

## 2019-08-23 NOTE — H&P ADULT - PROBLEM SELECTOR PLAN 2
Chronic, stable, hx of CABG  -continue home ASA 81, plavix 75  -Continue atorvastatin. - WBC 11.13 on admission  - Leukocytosis likely from steroids; F/u procalcitonin  - F/u AM CBC - WBC 11.13 on admission  - Leukocytosis likely from steroids  - F/u AM CBC

## 2019-08-23 NOTE — H&P ADULT - NSICDXPASTMEDICALHX_GEN_ALL_CORE_FT
PAST MEDICAL HISTORY:  CAD (Coronary Artery Disease)     COPD (chronic obstructive pulmonary disease)     Diabetes Mellitus, Type II     Dyslipidemia     Hypertension     Osteomyelitis     PVD (peripheral vascular disease) PAST MEDICAL HISTORY:  CAD (Coronary Artery Disease) s/p 3v CABG 2004    COPD (chronic obstructive pulmonary disease) on 2L at home and BiPAP at night; intubated 6/18    Diabetes Mellitus, Type II     Dyslipidemia     Hypertension     Osteomyelitis     PVD (peripheral vascular disease)

## 2019-08-23 NOTE — H&P ADULT - NSHPPHYSICALEXAM_GEN_ALL_CORE
T(C): --  HR: 114 (08-23-19 @ 20:54) (114 - 120)  BP: 132/81 (08-23-19 @ 20:54) (132/81 - 160/90)  RR: 21 (08-23-19 @ 20:54) (21 - 32)  SpO2: 100% (08-23-19 @ 20:54) (100% - 100%)    GENERAL: patient appears well, no acute distress, appropriate, pleasant  EYES: sclera clear, no exudates  ENMT: oropharynx clear without erythema, no exudates, moist mucous membranes  NECK: supple, soft, no thyromegaly noted  LUNGS: good air entry bilaterally, clear to auscultation, symmetric breath sounds, no wheezing or rhonchi appreciated  HEART: soft S1/S2, regular rate and rhythm, no murmurs noted, no lower extremity edema  GASTROINTESTINAL: abdomen is soft, nontender, nondistended, normoactive bowel sounds, no palpable masses  INTEGUMENT: good skin turgor, no lesions noted  MUSCULOSKELETAL: no clubbing or cyanosis, no obvious deformity  NEUROLOGIC: awake, alert, oriented x3, good muscle tone in 4 extremities, no obvious sensory deficits  PSYCHIATRIC: mood is good, affect is congruent, linear and logical thought process  HEME/LYMPH: no palpable supraclavicular nodules, no obvious ecchymosis or petechiae T(C): --  HR: 114 (08-23-19 @ 20:54) (114 - 120)  BP: 132/81 (08-23-19 @ 20:54) (132/81 - 160/90)  RR: 21 (08-23-19 @ 20:54) (21 - 32)  SpO2: 100% (08-23-19 @ 20:54) (100% - 100%)    GENERAL: pt resting comfortably in bed with BiPAP in place  EYES: sclera clear, no exudates  ENMT: unable to examine due to BiPAP  NECK: supple, soft, no JVD  LUNGS: good air entry bilaterally, course breath sounds b/l w/ occasional wheezing  HEART: S1/S2, regular rate and rhythm, no murmurs noted, 1+ pitting edema of ankles b/l   GASTROINTESTINAL: abdomen is soft, nontender, nondistended, normoactive bowel sounds, no palpable masses  INTEGUMENT: good skin turgor  MUSCULOSKELETAL: no clubbing or cyanosis, no obvious deformity  NEUROLOGIC: awake, alert, oriented x3, good muscle tone in 4 extremities, no obvious sensory deficits  PSYCHIATRIC: mood is good, affect is congruent, linear and logical thought process  HEME/LYMPH: no obvious ecchymosis or petechiae Vital Signs Last 24 Hrs  T(C): 36.4 (23 Aug 2019 22:25), Max: 36.4 (23 Aug 2019 22:25)  T(F): 97.6 (23 Aug 2019 22:25), Max: 97.6 (23 Aug 2019 22:25)  HR: 103 (23 Aug 2019 22:25) (103 - 120)  BP: 134/74 (23 Aug 2019 22:25) (132/81 - 160/90)  RR: 21 (23 Aug 2019 22:25) (21 - 32)  SpO2: 100% (23 Aug 2019 22:25) (99% - 100%)    GENERAL: pt resting comfortably in bed with BiPAP in place  EYES: sclera clear, no exudates  ENMT: unable to examine due to BiPAP  NECK: supple, soft, no JVD  LUNGS: good air entry bilaterally, course breath sounds b/l w/ occasional wheezing  HEART: S1/S2, regular rate and rhythm, no murmurs noted, 1+ pitting edema of ankles b/l   GASTROINTESTINAL: abdomen is soft, nontender, nondistended, normoactive bowel sounds, no palpable masses  INTEGUMENT: good skin turgor  MUSCULOSKELETAL: no clubbing or cyanosis, no obvious deformity  NEUROLOGIC: awake, alert, oriented x3, good muscle tone in 4 extremities, no obvious sensory deficits  PSYCHIATRIC: mood is good, affect is congruent, linear and logical thought process  HEME/LYMPH: no obvious ecchymosis or petechiae

## 2019-08-23 NOTE — CONSULT NOTE ADULT - SUBJECTIVE AND OBJECTIVE BOX
Patient chart was reviewed Patient has not been examined yet but will be done so later today and following that  the final note will be entered in the space below Workup and management orders based on current assessment have been entered to expedite patient care  Nursing notified for stat orders as applicable Meanwhile please refer to my previous Pulmonary Critical Care notes on this patient or call me directly COMMODORE TURNER [facility] P [phone]  1940 DOA 2019  ALLERGY Shellfish  CONTACT Oumar EATON      Initial evaluation/Pulmonary Critical Care consultation requested on  2019  by Dr Espinal  from Dr Dejesus   Patient examined chart reviewed    HOSPITAL ADMISSION   PATIENT CAME  FROM (if information available)      PATIENT COMMODORE TURNER [facility] P [phone]  1940 DOA 2019                           PT DESCRIPTION       This section was excerpted from ER md note but was independently verified by me    · Chief Complaint: The patient is a 79y Male complaining of difficulty breathing.  · Unable to Obtain: Urgent need for Intervention  · Details: acute respiratory distress  · HPI Objective Statement: 79 year old M with PMH HTN, COPD (On home O2 2L and BIPAP at while sleeping regardless of time of day), CAD w/ 3v CABG, HLD, DM2 (on Metformin), hx Hypercapnic Resp Failure/Cardiac Arrest requiring intubation () BIBEMS for copd exacerbation, given duoneb treatment and 125mg solu medrol.  Patient states feeling short of breath x 2 days.    PAST MEDICAL/SURGICAL/FAMILY/SOCIAL HISTORY:    Past Medical History:  CAD (Coronary Artery Disease)    COPD (chronic obstructive pulmonary disease)    Diabetes Mellitus, Type II    Dyslipidemia    Hypertension    Osteomyelitis    PVD (peripheral vascular disease).     Past Surgical History:  CABG (Coronary Artery Bypass Graft)    Compound fracture  left leg.     Tobacco Usage:  · Tobacco Usage Unknown if ever smoked    ALLERGIES AND HOME MEDICATIONS:   Allergies:        Allergies:  No Known Allergies:        Intolerances:  shellfish: Food, Nausea, feels nauseous when eating crabs or shrimp but no true allergic reaction    Home Medications:   * Incomplete Medication History as of 23-Aug-2019 20:21 documented in Structured Notes  · valsartan 80 mg oral tablet: Last Dose Taken:  , 1 tab(s) orally once a day  · clopidogrel 75 mg oral tablet: Last Dose Taken:  , 1 tab(s) orally once a day  · atorvastatin 40 mg oral tablet: Last Dose Taken:  , 1 tab(s) orally once a day  · tamsulosin 0.4 mg oral capsule: Last Dose Taken:  , 1 cap(s) orally once a day (at bedtime)  · Ventolin HFA 90 mcg/inh inhalation aerosol: Last Dose Taken:  , 2 puff(s) inhaled 4 times a day  · Breo Ellipta 100 mcg-25 mcg/inh inhalation powder: Last Dose Taken:  , 1 puff(s) inhaled once a day  · metFORMIN 1000 mg oral tablet: Last Dose Taken:  , 1 tab(s) orally 2 times a day  · Incruse Ellipta 62.5 mcg/inh inhalation powder: Last Dose Taken:  , 1 puff(s) inhaled once a day  · Metoprolol Tartrate 25 mg oral tablet: Last Dose Taken:  , 0.5 tab(s) orally 2 times a day  · glipiZIDE 2.5 mg oral tablet, extended release: Last Dose Taken:  , 1 tab(s) orally 2 times a day    REVIEW OF SYSTEMS:    Review of Systems:  · UNABLE TO OBTAIN: Urgent need for Intervention  · Details: acute respiratory distress    VITAL SIGNS( Pullset):    ,,ED ADULT Flow Sheet:    23-Aug-2019 20:14  · Heart Rate Heart Rate (beats/min): 120  · Respiration Rate (breaths/min) Respiration Rate (breaths/min): 32  · SpO2 (%) SpO2 (%): 100  · O2 delivery Patient On: supplemental O2  · Oxygen Therapy Flow (L/min) Oxygen Flow (L/min): 15  · Oxygen Therapy: Delivery Method Delivery Method: BiPAP/CPAP  · SpO2 (%) SpO2 (%): 100  · O2 delivery Patient On: supplemental O2  · Oxygen Therapy: Delivery Method Delivery Method: BiPAP/CPAP  · Preferred Language to Address Healthcare Preferred Language to Address Healthcare: English    20:18  · Presence of Pain: denies pain/discomfort  · Pain Rating (0-10): Rest: 0  · Pain Rating (0-10): Activity: 0  · Preferred Language to Address Healthcare Preferred Language to Address Healthcare: English  · Patient Belongings Patient Belongings:: Clothing  · Extensions of Self Extensions of Self: None    PHYSICAL EXAM:   · Physical Examination: Gen: Alert, in moderate respiratory distress  Head/eyes: NC/AT, PERRL, EOMI  ENT: airway patent  Neck: supple, no tenderness/meningismus/JVD, Trachea midline  Pulm/lung: b/l wheeze  CV/heart: RRR, no M/R/G, +2 dist pulses (radial, pedal DP/PT, popliteal)  GI/Abd: soft, NT/ND, +BS, no guarding/rebound tenderness  Musculoskeletal: no edema/erythema/cyanosis, FROM in all extremities, no C/T/L spine ttp  Skin: no rash, no vesicles, no petechaie, no ecchymosis, no swelling  Neuro: AAOx3, CN 2-12 intact, normal sensation, 5/5 motor strength in all extremities              PATIENT COMMODORE YUE [facility] P [phone]  1940 DOA 2019    VITALS/LABS         2019 W 11.1 Hb 13.4 Plt 306 INR .9 Na 142 K 4.6 CO2 30 Cr 1.1  2019 Tr 1 n   2019 18//.31 729/66/101

## 2019-08-23 NOTE — H&P ADULT - ASSESSMENT
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 Admitted for COPD exacerbation.

## 2019-08-23 NOTE — H&P ADULT - HISTORY OF PRESENT ILLNESS
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 Per wife, pt has been doing well since his last discharge from [facility] and was on a steroid taper. Wife feels as if pt began having worsening SOB in the last few days of his steroid taper which ended 2 days ago. Wife spoke with pulm, Dr. Dejesus, who felt pt should start taking prednisone 10mg every other day following the steroid taper. Per wife, pt did better after first day of prednisone 10mg and then significantly worse the next day. Last dose of prednisone 10mg this AM with no improvement of symptoms. Per wife, pt's SpO2 after exertion was 88% and improved to 94% on BiPAP. Wife decided to take the pt into ED today as pt began to gasp for air at 4pm and there was no improvement of symptoms with pt's home BiPAP. Pt currently on BiPAP in bed and denies any SOB at this time. Denies any sick contacts or recent travel. Flu shot in June. Admits to chronic non-productive cough. Denies headache, fever, chills, N/V/D, chest pain, palpitations, abdominal pain, or change in bowel or urinary habits.     In the ED,   VS:   CBC WBC 11.13, Hg 13.4, Hct 43.8, platelets 308  PTT 31.3, PT 10.6, INR 0.93   Lactate 1.6  BMP WNL  Troponin <.015, proBNP 103     Imaging:  CXR:  EKG:    In the ED, received duoneb x 1, magnesium sulfate 2 g x 1. 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 from 8/12-8/14 for COPD exacerbation presents to ED with wife for worsening SOB for 2 days. Per wife, pt has been doing well since his last discharge from [facility] and was on a steroid taper. Wife feels as if pt began having worsening SOB in the last few days of his steroid taper which ended 2 days ago. Wife spoke with pulm, Dr. Dejesus, who felt pt should start taking prednisone 10mg every other day following the steroid taper. Per wife, pt did better after first day of prednisone 10mg and then significantly worse the next day. Last dose of prednisone 10mg this AM with no improvement of symptoms. Per wife, pt's SpO2 after exertion was 88% and improved to 94% on BiPAP. Wife decided to take the pt into ED today as pt began to gasp for air at 4pm and there was no improvement of symptoms with pt's home BiPAP. Pt currently on BiPAP in bed and denies any SOB at this time. Denies any sick contacts or recent travel. Flu shot in June. Admits to chronic non-productive cough. Denies headache, fever, chills, N/V/D, chest pain, palpitations, abdominal pain, or change in bowel or urinary habits. Pt has been sleeping well and has had no change in appetite.     In the ED,   VS: , /90, RR 32, SpO2 100 on BiPAP  CBC WBC 11.13, Hg 13.4, Hct 43.8, platelets 308  PTT 31.3, PT 10.6, INR 0.93   Lactate 1.6  BMP WNL  Troponin <.015, proBNP 103   ABG: pH 7.29, pCO2 66, pO2 101, SpO2 97%    Imaging:  CXR: hyperinflated lungs, no active infiltrates on prelim read; official read pending  EKG: sinus tachycardia @122bpm  (all imaging was personally reviewed)    In the ED, received duoneb x 1, magnesium sulfate 2 g x 1. Pulm Dr Dejesus consulted in the ED.

## 2019-08-23 NOTE — H&P ADULT - PROBLEM SELECTOR PLAN 4
Chronic, stable  -continue home atorvastatin. Chronic, stable, hx of CABG  -continue home ASA 81, plavix 75  -Continue atorvastatin.

## 2019-08-23 NOTE — H&P ADULT - NSHPREVIEWOFSYSTEMS_GEN_ALL_CORE
CONSTITUTIONAL: denies fever, chills, fatigue, weakness  HEENT: denies blurred vision, sore throat  SKIN: denies new lesions, rash  CARDIOVASCULAR: denies chest pain, chest pressure, palpitations  RESPIRATORY: denies shortness of breath, sputum production  GASTROINTESTINAL: denies nausea, vomiting, diarrhea, abdominal pain  GENITOURINARY: denies dysuria, discharge  NEUROLOGICAL: denies numbness, headache, focal weakness  MUSCULOSKELETAL: denies new joint pain, muscle aches  HEMATOLOGIC: denies gross bleeding, bruising  LYMPHATICS: denies enlarged lymph nodes, extremity swelling  PSYCHIATRIC: denies recent changes in anxiety, depression  ENDOCRINOLOGIC: denies sweating, cold or heat intolerance CONSTITUTIONAL: denies fever, chills, fatigue, weakness  HEENT: denies blurred vision, sore throat  SKIN: denies new lesions, rash  CARDIOVASCULAR: denies chest pain, chest pressure, palpitations  RESPIRATORY: admits to SOB at rest and w/ exertion and non-productive cough, denies sputum production  GASTROINTESTINAL: denies nausea, vomiting, diarrhea, abdominal pain  GENITOURINARY: denies dysuria, discharge  NEUROLOGICAL: denies numbness, headache, focal weakness  MUSCULOSKELETAL: denies new joint pain, muscle aches  HEMATOLOGIC: denies gross bleeding, bruising  LYMPHATICS: denies enlarged lymph nodes, extremity swelling  PSYCHIATRIC: denies recent changes in anxiety, depression  ENDOCRINOLOGIC: denies sweating, cold or heat intolerance CONSTITUTIONAL: denies fever, chills, fatigue, weakness  HEENT: denies blurred vision, sore throat  SKIN: denies new lesions, rash  CARDIOVASCULAR: admits to b/l swelling of ankles, denies chest pain, chest pressure, palpitations  RESPIRATORY: admits to SOB at rest and w/ exertion and non-productive cough, denies sputum production  GASTROINTESTINAL: denies nausea, vomiting, diarrhea, abdominal pain  GENITOURINARY: denies dysuria, discharge  NEUROLOGICAL: denies numbness, headache, focal weakness  MUSCULOSKELETAL: denies new joint pain, muscle aches  HEMATOLOGIC: denies gross bleeding, bruising  LYMPHATICS: denies enlarged lymph nodes, extremity swelling  PSYCHIATRIC: denies recent changes in anxiety, depression  ENDOCRINOLOGIC: denies sweating, cold or heat intolerance

## 2019-08-23 NOTE — ED ADULT TRIAGE NOTE - CHIEF COMPLAINT QUOTE
brought in by EMS from home for complaints of difficulty breathing. patient with history of COPD, nasal canula oxygen delivery at home. EMS administered one duoneb treatment and one dose of solumedrol IVP. patient presents on CPAP

## 2019-08-23 NOTE — H&P ADULT - NSICDXPASTSURGICALHX_GEN_ALL_CORE_FT
PAST SURGICAL HISTORY:  CABG (Coronary Artery Bypass Graft)     Compound fracture left leg PAST SURGICAL HISTORY:  CABG (Coronary Artery Bypass Graft) 2004    Compound fracture left leg

## 2019-08-23 NOTE — CONSULT NOTE ADULT - ASSESSMENT
PATIENT COMMODORE YUE [facility] P [phone]  1940 DOA 2019                           Patient description                          79 m former smoker PMH HTN, DM2 (on home Metformin and glipizide), COPD (2L home/ BIPAP at night), CAD with 3v CABG , HLD, 10/26/2018 ECHO ef 55% hx hypercapnic resp failure with ho intubation c/b with cardiac arrest (2018) with ho recurrent admissions GOLD D admitted 2019 with progressive sob No sick contacts No fever No travel                                                PATIENT COMMODORE YUE [facility] P [phone]  1940 DOA 2019                            PULMONARY/CRITICAL CARE ASSESSMENT/RECOMMENDATIONS (A/R)        ACUTE ON CHRONIC HYPERCAPNIC RESP FAILURE   2019 18/5/.31 729/66/101  A/R  Monitor abg     COPD ex  A/R  Agree withBD steroids azithro       TIME SPENT Over 55 minutes aggregate care time spent on encounter; activities included   direct pt care, counseling and/or coordinating care reviewing notes, lab data/ imaging , discussion with multidisciplinary team/ pt /family. Risks, benefits, alternatives  discussed in detail.

## 2019-08-23 NOTE — H&P ADULT - PROBLEM SELECTOR PLAN 6
DVT prophylaxis with lovenox  IMPROVE VTE Individual Risk Assessment  RISK                                                                Points  [  ] Previous VTE                                                  3  [  ] Thrombophilia                                               2  [  ] Lower limb paralysis                                      2        (unable to hold up >15 seconds)    [  ] Current Cancer                                              2         (within 6 months)  [ x ] Immobilization > 24 hrs (expected while on bipap)      1  [  ] ICU/CCU stay > 24 hours                              1  [ x ] Age > 60                                                      1  IMPROVE VTE Score ____2_____ Chronic, stable  -continue home atorvastatin.

## 2019-08-23 NOTE — H&P ADULT - PROBLEM SELECTOR PLAN 3
Chronic  -Home meds include metformin and glipizide  -Will place patient on moderate dose insulin sliding scale (considering patient will be on steroids), accuchecks, and hypoglycemia protocol  -monitor blood glucose while on systemic steroids. - HR ranging from 110-120s  - Most likely 2/2 to shortness of breath and albuterol treatment   - No acute EKG changes  - Will monitor with remote tele

## 2019-08-23 NOTE — CONSULT NOTE ADULT - CONSULT REQUESTED BY NAME
TALIB squires Normal vision: sees adequately in most situations; can see medication labels, newsprint

## 2019-08-23 NOTE — ED PROVIDER NOTE - OBJECTIVE STATEMENT
79 year old M with PMH HTN, COPD (On home O2 2L and BIPAP at while sleeping regardless of time of day), CAD w/ 3v CABG, HLD, DM2 (on Metformin), hx Hypercapnic Resp Failure/Cardiac Arrest requiring intubation (6/18) BIBEMS for copd exacerbation, given duoneb treatment and 125mg solu medrol.  Patient states feeling short of breath x 2 days.

## 2019-08-23 NOTE — H&P ADULT - PROBLEM SELECTOR PLAN 1
Patient currently on bipap and home oxygen 2L    - continue with duonebs q6, pulmicort and solumedrol  - Will monitor repeat ABG'  - Leukocytosis likely from steroids; F/u procalcitonin  - will defer need for antibiotics to pulmonary  - F/u with official chest X-ray Patient currently on bipap @ night and home oxygen 2L  - s/p duoneb x 1, magnesium sulfate 2 g x 1  - F/u with official chest X-ray  - continue BiPAP  - continue with duonebs q6 PRN for SOB  - will continue pts home meds:   - Will monitor repeat ABG  - Will defer need for antibiotics and steroids to pulmonary  - Pulm consult, Dr. Dejesus, pending--will follow recs Patient currently on bipap @ night and home oxygen 2L  - s/p duoneb x 1, magnesium sulfate 2 g x 1  - F/u with official chest X-ray  - f/u blood cx x2 and urine cx   - continue BiPAP  - Duoneb 3mL q6   - Solumedrol 40mg IV q8  - will continue pts home meds: symbicort BID and spiriva daily in replacement of Breo Ellipta and Incruse ellipta   - Will monitor repeat ABG  - Continuous pulse ox   - Will defer need for antibiotics and steroids to pulmonary  - Pulm consult, Dr. Dejesus, pending--will follow recs Patient currently on bipap @ night and home oxygen 2L  - s/p duoneb x 1, magnesium sulfate 2 g x 1  - F/u with official chest X-ray  - f/u blood cx x2 and urine cx   - continue BiPAP  - Duoneb 3mL q6   - Solumedrol 40mg IV q8  - Azithromycin 500mg PO daily for 5 days  - will continue pts home meds: symbicort BID and spiriva daily in replacement of Breo Ellipta and Incruse ellipta   - Will monitor repeat ABG  - Continuous pulse ox   - Pulm consult, Dr. Dejesus, pending--will follow recs

## 2019-08-23 NOTE — ED PROVIDER NOTE - PHYSICAL EXAMINATION
Gen: Alert, in moderate respiratory distress  Head/eyes: NC/AT, PERRL, EOMI  ENT: airway patent  Neck: supple, no tenderness/meningismus/JVD, Trachea midline  Pulm/lung: b/l wheeze  CV/heart: RRR, no M/R/G, +2 dist pulses (radial, pedal DP/PT, popliteal)  GI/Abd: soft, NT/ND, +BS, no guarding/rebound tenderness  Musculoskeletal: no edema/erythema/cyanosis, FROM in all extremities, no C/T/L spine ttp  Skin: no rash, no vesicles, no petechaie, no ecchymosis, no swelling  Neuro: AAOx3, CN 2-12 intact, normal sensation, 5/5 motor strength in all extremities

## 2019-08-24 LAB
ANION GAP SERPL CALC-SCNC: 10 MMOL/L — SIGNIFICANT CHANGE UP (ref 5–17)
BASE EXCESS BLDA CALC-SCNC: 4.5 MMOL/L — HIGH (ref -2–2)
BLOOD GAS COMMENTS ARTERIAL: SIGNIFICANT CHANGE UP
BUN SERPL-MCNC: 17 MG/DL — SIGNIFICANT CHANGE UP (ref 7–23)
CALCIUM SERPL-MCNC: 9.9 MG/DL — SIGNIFICANT CHANGE UP (ref 8.5–10.1)
CHLORIDE SERPL-SCNC: 102 MMOL/L — SIGNIFICANT CHANGE UP (ref 96–108)
CO2 SERPL-SCNC: 27 MMOL/L — SIGNIFICANT CHANGE UP (ref 22–31)
CREAT SERPL-MCNC: 1.4 MG/DL — HIGH (ref 0.5–1.3)
GLUCOSE SERPL-MCNC: 301 MG/DL — HIGH (ref 70–99)
HCO3 BLDA-SCNC: 27 MMOL/L — SIGNIFICANT CHANGE UP (ref 23–27)
HCT VFR BLD CALC: 44 % — SIGNIFICANT CHANGE UP (ref 39–50)
HGB BLD-MCNC: 13.5 G/DL — SIGNIFICANT CHANGE UP (ref 13–17)
HOROWITZ INDEX BLDA+IHG-RTO: SIGNIFICANT CHANGE UP
MCHC RBC-ENTMCNC: 27.8 PG — SIGNIFICANT CHANGE UP (ref 27–34)
MCHC RBC-ENTMCNC: 30.7 GM/DL — LOW (ref 32–36)
MCV RBC AUTO: 90.5 FL — SIGNIFICANT CHANGE UP (ref 80–100)
NRBC # BLD: 0 /100 WBCS — SIGNIFICANT CHANGE UP (ref 0–0)
PCO2 BLDA: 58 MMHG — HIGH (ref 32–46)
PH BLDA: 7.33 — LOW (ref 7.35–7.45)
PLATELET # BLD AUTO: 311 K/UL — SIGNIFICANT CHANGE UP (ref 150–400)
PO2 BLDA: 123 MMHG — HIGH (ref 74–108)
POTASSIUM SERPL-MCNC: 5 MMOL/L — SIGNIFICANT CHANGE UP (ref 3.5–5.3)
POTASSIUM SERPL-SCNC: 5 MMOL/L — SIGNIFICANT CHANGE UP (ref 3.5–5.3)
PROCALCITONIN SERPL-MCNC: <0.05 — SIGNIFICANT CHANGE UP (ref 0–0.04)
RBC # BLD: 4.86 M/UL — SIGNIFICANT CHANGE UP (ref 4.2–5.8)
RBC # FLD: 13.3 % — SIGNIFICANT CHANGE UP (ref 10.3–14.5)
SAO2 % BLDA: 99 % — HIGH (ref 92–96)
SODIUM SERPL-SCNC: 139 MMOL/L — SIGNIFICANT CHANGE UP (ref 135–145)
WBC # BLD: 14.07 K/UL — HIGH (ref 3.8–10.5)
WBC # FLD AUTO: 14.07 K/UL — HIGH (ref 3.8–10.5)

## 2019-08-24 PROCEDURE — 99233 SBSQ HOSP IP/OBS HIGH 50: CPT

## 2019-08-24 RX ORDER — DEXTROSE 50 % IN WATER 50 %
25 SYRINGE (ML) INTRAVENOUS ONCE
Refills: 0 | Status: DISCONTINUED | OUTPATIENT
Start: 2019-08-24 | End: 2019-08-25

## 2019-08-24 RX ORDER — GLUCAGON INJECTION, SOLUTION 0.5 MG/.1ML
1 INJECTION, SOLUTION SUBCUTANEOUS ONCE
Refills: 0 | Status: DISCONTINUED | OUTPATIENT
Start: 2019-08-24 | End: 2019-08-25

## 2019-08-24 RX ORDER — SODIUM CHLORIDE 9 MG/ML
1000 INJECTION, SOLUTION INTRAVENOUS
Refills: 0 | Status: DISCONTINUED | OUTPATIENT
Start: 2019-08-24 | End: 2019-08-25

## 2019-08-24 RX ORDER — INSULIN LISPRO 100/ML
VIAL (ML) SUBCUTANEOUS AT BEDTIME
Refills: 0 | Status: DISCONTINUED | OUTPATIENT
Start: 2019-08-24 | End: 2019-08-25

## 2019-08-24 RX ORDER — DEXTROSE 50 % IN WATER 50 %
12.5 SYRINGE (ML) INTRAVENOUS ONCE
Refills: 0 | Status: DISCONTINUED | OUTPATIENT
Start: 2019-08-24 | End: 2019-08-25

## 2019-08-24 RX ORDER — BUDESONIDE AND FORMOTEROL FUMARATE DIHYDRATE 160; 4.5 UG/1; UG/1
2 AEROSOL RESPIRATORY (INHALATION)
Refills: 0 | Status: DISCONTINUED | OUTPATIENT
Start: 2019-08-24 | End: 2019-08-25

## 2019-08-24 RX ORDER — DEXTROSE 50 % IN WATER 50 %
15 SYRINGE (ML) INTRAVENOUS ONCE
Refills: 0 | Status: DISCONTINUED | OUTPATIENT
Start: 2019-08-24 | End: 2019-08-25

## 2019-08-24 RX ADMIN — SODIUM CHLORIDE 75 MILLILITER(S): 9 INJECTION, SOLUTION INTRAVENOUS at 20:20

## 2019-08-24 RX ADMIN — Medication 40 MILLIGRAM(S): at 14:00

## 2019-08-24 RX ADMIN — Medication 6: at 21:52

## 2019-08-24 RX ADMIN — VALSARTAN 80 MILLIGRAM(S): 80 TABLET ORAL at 06:00

## 2019-08-24 RX ADMIN — Medication 3 MILLILITER(S): at 13:38

## 2019-08-24 RX ADMIN — BUDESONIDE AND FORMOTEROL FUMARATE DIHYDRATE 2 PUFF(S): 160; 4.5 AEROSOL RESPIRATORY (INHALATION) at 06:02

## 2019-08-24 RX ADMIN — TAMSULOSIN HYDROCHLORIDE 0.4 MILLIGRAM(S): 0.4 CAPSULE ORAL at 21:11

## 2019-08-24 RX ADMIN — ENOXAPARIN SODIUM 40 MILLIGRAM(S): 100 INJECTION SUBCUTANEOUS at 12:09

## 2019-08-24 RX ADMIN — Medication 3 MILLILITER(S): at 18:42

## 2019-08-24 RX ADMIN — Medication 6: at 12:08

## 2019-08-24 RX ADMIN — Medication 3 MILLILITER(S): at 00:57

## 2019-08-24 RX ADMIN — Medication 40 MILLIGRAM(S): at 00:51

## 2019-08-24 RX ADMIN — Medication 3 MILLILITER(S): at 07:27

## 2019-08-24 RX ADMIN — AZITHROMYCIN 500 MILLIGRAM(S): 500 TABLET, FILM COATED ORAL at 12:08

## 2019-08-24 RX ADMIN — CLOPIDOGREL BISULFATE 75 MILLIGRAM(S): 75 TABLET, FILM COATED ORAL at 12:09

## 2019-08-24 RX ADMIN — ATORVASTATIN CALCIUM 40 MILLIGRAM(S): 80 TABLET, FILM COATED ORAL at 21:11

## 2019-08-24 RX ADMIN — Medication 40 MILLIGRAM(S): at 06:00

## 2019-08-24 RX ADMIN — Medication 6: at 17:26

## 2019-08-24 RX ADMIN — Medication 8: at 08:38

## 2019-08-24 RX ADMIN — Medication 25 MILLIGRAM(S): at 06:00

## 2019-08-24 RX ADMIN — Medication 40 MILLIGRAM(S): at 21:11

## 2019-08-24 NOTE — PROGRESS NOTE ADULT - PROBLEM SELECTOR PLAN 3
- HR ranging from 110-120s  - Most likely 2/2 to shortness of breath and albuterol treatment   - No acute EKG changes  - Will monitor with remote tele

## 2019-08-24 NOTE — PROGRESS NOTE ADULT - SUBJECTIVE AND OBJECTIVE BOX
Patient was examined Chart reviewed Detailed note will be inserted below after going over latest data Meanwhile please refer to my previous notes for Pulmonary/Critical Care assessment recommendations COMMODORE TURNER [facility] P [phone]  1940 DOA 2019  ALLERGY Shellfish  CONTACT Sp [facility]      Initial evaluation/Pulmonary Critical Care consultation requested on  2019  by Dr Espinal  from Dr Dejesus   Patient examined chart reviewed    HOSPITAL ADMISSION   PATIENT CAME  FROM (if information available)        TYPE OF VISIT      Subsequent Pulmonary followup     REASON FOR VISIT  PLEASE SEE PROBLEM LIST/ASSESSMENT AND RECOMMENDATIONS     PATIENT COMMODORE TURNER [facility] P [phone]  1940 DOA 2019    VITALS/LABS       2019 afeb 78 116/68 20 99%   2019 1p 18/5/.31   2019 W 14 hb 13.5 Plt 311 Na 139 K 5 CO2 27 Cr 1.4     GLOBAL ISSUE/BEST PRACTICE:      PROBLEM: HOB elevation:   y            PROBLEM: Stress ulcer proph:    na                      PROBLEM: VTE prophylaxis:  lvnx 40 )      PROBLEM: Glycemic control:    na  PROBLEM: Nutrition:        cons carb ()           PROBLEM: Advanced directive: na     PROBLEM: Allergies:  shellfish    REVIEW OF SYMPTOMS     NOTE Noteworthy changes  if any  in ROS and PE are also entered  in note  below      Able to give ROS  Yes     RELIABLE No   CONSTITUTIONAL Weakness Yes  Chills No Vision changes No  ENDOCRINE No unexplained hair loss No heat or cold intolerance    ALLERGY No hives  Sore throat No   RESP Coughing blood no  Shortness of breath YES   NEURO No Headache  Confusion Pain neck No   CARDIAC No Chest pain No Palpitations   GI No Pain abdomen NO   Vomiting NO     PHYSICAL EXAM    HEENT Unremarkable PERRLA atraumatic   RESP Fair air entry EXP prolonged    Harsh breath sound Resp distres mild   CARDIAC S1 S2 No S3     NO JVD    ABDOMEN SOFT BS PRESENT NOT DISTENDED No hepatosplenomegaly PEDAL EDEMA present No calf tenderness  NO rash   GENERAL Not TOXIC looking    PATIENT COMMODORE TURNER [facility] P [phone]  1940 DOA 2019                           Patient description                          79 m former smoker PMH HTN, DM2 (on home Metformin and glipizide), COPD (2L home/ BIPAP at night), CAD with 3v CABG , HLD, 10/26/2018 ECHO ef 55% hx hypercapnic resp failure with ho intubation c/b with cardiac arrest (2018) with ho recurrent admissions GOLD D admitted 2019 with progressive sob No sick contacts No fever No travel                                                                                        Hospital course                                  2019 improved

## 2019-08-24 NOTE — PROGRESS NOTE ADULT - PROBLEM SELECTOR PLAN 7
Chronic, stable  -Continue home meds -metoprolol tartrate 25mg PO daily and valsartan 80mg daily with hold parameters  -monitor routine hemodynamics.

## 2019-08-24 NOTE — PROGRESS NOTE ADULT - PROBLEM SELECTOR PLAN 5
Chronic  -Home meds include metformin and glipizide--will hold   -Will place patient on moderate dose insulin sliding scale (considering patient will be on steroids), accuchecks, and hypoglycemia protocol  -monitor blood glucose while on systemic steroids.

## 2019-08-24 NOTE — PROGRESS NOTE ADULT - PROBLEM SELECTOR PLAN 1
Patient currently on bipap @ night, admits to needs bipap adjusted as outpt and home oxygen 2L  - f/u blood cx x2 and urine cx   - continue BiPAP  - Duoneb 3mL q6   - Solumedrol 40mg IV q8  - Azithromycin 500mg PO daily for 5 days  - will continue pts home meds: symbicort BID and spiriva daily in replacement of Breo Ellipta and Incruse ellipta   - Will monitor repeat ABG  - Continuous pulse ox   - Pulm consult, Dr. Dejesus, apprec recs

## 2019-08-25 ENCOUNTER — TRANSCRIPTION ENCOUNTER (OUTPATIENT)
Age: 79
End: 2019-08-25

## 2019-08-25 VITALS — OXYGEN SATURATION: 98 %

## 2019-08-25 LAB
ALBUMIN SERPL ELPH-MCNC: 3 G/DL — LOW (ref 3.3–5)
ALP SERPL-CCNC: 76 U/L — SIGNIFICANT CHANGE UP (ref 40–120)
ALT FLD-CCNC: 27 U/L — SIGNIFICANT CHANGE UP (ref 12–78)
ANION GAP SERPL CALC-SCNC: 7 MMOL/L — SIGNIFICANT CHANGE UP (ref 5–17)
APPEARANCE UR: CLEAR — SIGNIFICANT CHANGE UP
AST SERPL-CCNC: 9 U/L — LOW (ref 15–37)
BASOPHILS # BLD AUTO: 0.01 K/UL — SIGNIFICANT CHANGE UP (ref 0–0.2)
BASOPHILS NFR BLD AUTO: 0.1 % — SIGNIFICANT CHANGE UP (ref 0–2)
BILIRUB SERPL-MCNC: 0.3 MG/DL — SIGNIFICANT CHANGE UP (ref 0.2–1.2)
BILIRUB UR-MCNC: NEGATIVE — SIGNIFICANT CHANGE UP
BUN SERPL-MCNC: 24 MG/DL — HIGH (ref 7–23)
CALCIUM SERPL-MCNC: 9.3 MG/DL — SIGNIFICANT CHANGE UP (ref 8.5–10.1)
CHLORIDE SERPL-SCNC: 102 MMOL/L — SIGNIFICANT CHANGE UP (ref 96–108)
CO2 SERPL-SCNC: 32 MMOL/L — HIGH (ref 22–31)
COLOR SPEC: YELLOW — SIGNIFICANT CHANGE UP
CREAT SERPL-MCNC: 1.3 MG/DL — SIGNIFICANT CHANGE UP (ref 0.5–1.3)
DIFF PNL FLD: NEGATIVE — SIGNIFICANT CHANGE UP
EOSINOPHIL # BLD AUTO: 0 K/UL — SIGNIFICANT CHANGE UP (ref 0–0.5)
EOSINOPHIL NFR BLD AUTO: 0 % — SIGNIFICANT CHANGE UP (ref 0–6)
GLUCOSE SERPL-MCNC: 282 MG/DL — HIGH (ref 70–99)
GLUCOSE UR QL: 1000 MG/DL
HCT VFR BLD CALC: 37.1 % — LOW (ref 39–50)
HGB BLD-MCNC: 11.5 G/DL — LOW (ref 13–17)
IMM GRANULOCYTES NFR BLD AUTO: 0.8 % — SIGNIFICANT CHANGE UP (ref 0–1.5)
KETONES UR-MCNC: NEGATIVE — SIGNIFICANT CHANGE UP
LEUKOCYTE ESTERASE UR-ACNC: NEGATIVE — SIGNIFICANT CHANGE UP
LYMPHOCYTES # BLD AUTO: 0.55 K/UL — LOW (ref 1–3.3)
LYMPHOCYTES # BLD AUTO: 4.3 % — LOW (ref 13–44)
MCHC RBC-ENTMCNC: 27.9 PG — SIGNIFICANT CHANGE UP (ref 27–34)
MCHC RBC-ENTMCNC: 31 GM/DL — LOW (ref 32–36)
MCV RBC AUTO: 90 FL — SIGNIFICANT CHANGE UP (ref 80–100)
MONOCYTES # BLD AUTO: 0.67 K/UL — SIGNIFICANT CHANGE UP (ref 0–0.9)
MONOCYTES NFR BLD AUTO: 5.3 % — SIGNIFICANT CHANGE UP (ref 2–14)
NEUTROPHILS # BLD AUTO: 11.39 K/UL — HIGH (ref 1.8–7.4)
NEUTROPHILS NFR BLD AUTO: 89.5 % — HIGH (ref 43–77)
NITRITE UR-MCNC: NEGATIVE — SIGNIFICANT CHANGE UP
NRBC # BLD: 0 /100 WBCS — SIGNIFICANT CHANGE UP (ref 0–0)
PH UR: 5 — SIGNIFICANT CHANGE UP (ref 5–8)
PLATELET # BLD AUTO: 235 K/UL — SIGNIFICANT CHANGE UP (ref 150–400)
POTASSIUM SERPL-MCNC: 4.8 MMOL/L — SIGNIFICANT CHANGE UP (ref 3.5–5.3)
POTASSIUM SERPL-SCNC: 4.8 MMOL/L — SIGNIFICANT CHANGE UP (ref 3.5–5.3)
PROCALCITONIN SERPL-MCNC: <0.05 — SIGNIFICANT CHANGE UP (ref 0–0.04)
PROT SERPL-MCNC: 5.8 G/DL — LOW (ref 6–8.3)
PROT UR-MCNC: 25 MG/DL
RBC # BLD: 4.12 M/UL — LOW (ref 4.2–5.8)
RBC # FLD: 13.2 % — SIGNIFICANT CHANGE UP (ref 10.3–14.5)
SODIUM SERPL-SCNC: 141 MMOL/L — SIGNIFICANT CHANGE UP (ref 135–145)
SP GR SPEC: 1.01 — SIGNIFICANT CHANGE UP (ref 1.01–1.02)
UROBILINOGEN FLD QL: NEGATIVE — SIGNIFICANT CHANGE UP
WBC # BLD: 12.72 K/UL — HIGH (ref 3.8–10.5)
WBC # FLD AUTO: 12.72 K/UL — HIGH (ref 3.8–10.5)

## 2019-08-25 PROCEDURE — 80048 BASIC METABOLIC PNL TOTAL CA: CPT

## 2019-08-25 PROCEDURE — 85730 THROMBOPLASTIN TIME PARTIAL: CPT

## 2019-08-25 PROCEDURE — 94760 N-INVAS EAR/PLS OXIMETRY 1: CPT

## 2019-08-25 PROCEDURE — 82803 BLOOD GASES ANY COMBINATION: CPT

## 2019-08-25 PROCEDURE — 99239 HOSP IP/OBS DSCHRG MGMT >30: CPT

## 2019-08-25 PROCEDURE — 80053 COMPREHEN METABOLIC PANEL: CPT

## 2019-08-25 PROCEDURE — 87040 BLOOD CULTURE FOR BACTERIA: CPT

## 2019-08-25 PROCEDURE — 96374 THER/PROPH/DIAG INJ IV PUSH: CPT

## 2019-08-25 PROCEDURE — 84484 ASSAY OF TROPONIN QUANT: CPT

## 2019-08-25 PROCEDURE — 85610 PROTHROMBIN TIME: CPT

## 2019-08-25 PROCEDURE — 36415 COLL VENOUS BLD VENIPUNCTURE: CPT

## 2019-08-25 PROCEDURE — 93005 ELECTROCARDIOGRAM TRACING: CPT

## 2019-08-25 PROCEDURE — 99285 EMERGENCY DEPT VISIT HI MDM: CPT | Mod: 25

## 2019-08-25 PROCEDURE — 83605 ASSAY OF LACTIC ACID: CPT

## 2019-08-25 PROCEDURE — 71045 X-RAY EXAM CHEST 1 VIEW: CPT

## 2019-08-25 PROCEDURE — 85027 COMPLETE CBC AUTOMATED: CPT

## 2019-08-25 PROCEDURE — 94640 AIRWAY INHALATION TREATMENT: CPT

## 2019-08-25 PROCEDURE — 81001 URINALYSIS AUTO W/SCOPE: CPT

## 2019-08-25 PROCEDURE — 82962 GLUCOSE BLOOD TEST: CPT

## 2019-08-25 PROCEDURE — 87086 URINE CULTURE/COLONY COUNT: CPT

## 2019-08-25 PROCEDURE — 84145 PROCALCITONIN (PCT): CPT

## 2019-08-25 PROCEDURE — 36600 WITHDRAWAL OF ARTERIAL BLOOD: CPT

## 2019-08-25 PROCEDURE — 83880 ASSAY OF NATRIURETIC PEPTIDE: CPT

## 2019-08-25 PROCEDURE — 94660 CPAP INITIATION&MGMT: CPT

## 2019-08-25 RX ORDER — IPRATROPIUM/ALBUTEROL SULFATE 18-103MCG
3 AEROSOL WITH ADAPTER (GRAM) INHALATION
Qty: 540 | Refills: 0
Start: 2019-08-25 | End: 2019-09-23

## 2019-08-25 RX ORDER — AZITHROMYCIN 500 MG/1
1 TABLET, FILM COATED ORAL
Qty: 2 | Refills: 0
Start: 2019-08-25 | End: 2019-08-26

## 2019-08-25 RX ADMIN — CLOPIDOGREL BISULFATE 75 MILLIGRAM(S): 75 TABLET, FILM COATED ORAL at 11:50

## 2019-08-25 RX ADMIN — AZITHROMYCIN 500 MILLIGRAM(S): 500 TABLET, FILM COATED ORAL at 11:50

## 2019-08-25 RX ADMIN — Medication 40 MILLIGRAM(S): at 05:14

## 2019-08-25 RX ADMIN — Medication 6: at 08:34

## 2019-08-25 RX ADMIN — Medication 8: at 13:14

## 2019-08-25 RX ADMIN — TIOTROPIUM BROMIDE 1 CAPSULE(S): 18 CAPSULE ORAL; RESPIRATORY (INHALATION) at 06:31

## 2019-08-25 RX ADMIN — Medication 3 MILLILITER(S): at 07:50

## 2019-08-25 RX ADMIN — Medication 3 MILLILITER(S): at 13:11

## 2019-08-25 RX ADMIN — Medication 3 MILLILITER(S): at 00:01

## 2019-08-25 RX ADMIN — SODIUM CHLORIDE 75 MILLILITER(S): 9 INJECTION, SOLUTION INTRAVENOUS at 13:16

## 2019-08-25 RX ADMIN — ENOXAPARIN SODIUM 40 MILLIGRAM(S): 100 INJECTION SUBCUTANEOUS at 11:50

## 2019-08-25 NOTE — DISCHARGE NOTE PROVIDER - CARE PROVIDER_API CALL
Arun Dejesus)  Critical Care Medicine; Internal Medicine; Pulmonary Disease  [address] [place] [address]  Phone: [phone]  Fax: [phone]  Follow Up Time: 1 week

## 2019-08-25 NOTE — DISCHARGE NOTE PROVIDER - CARE PROVIDERS DIRECT ADDRESSES
[email or web address]

## 2019-08-25 NOTE — DISCHARGE NOTE PROVIDER - HOSPITAL COURSE
79 year old M with PMH HTN, COPD (On home O2 2L and BIPAP at while sleeping regardless of time of day), CAD (w/ 3v CABG 2004), HLD, DM2 (on Metformin), BPH, hx Hypercapnic Resp Failure/Cardiac Arrest requiring intubation (6/18), recently admitted at [facility] from 8/12-8/14 for COPD exacerbation presents to ED with wife for worsening SOB for 2 days. Admitted for acute COPD exacerbation. Pt was given nebs, placed on nocturnal bipap and given steroids. Pt showed clinical improvement and pulmonology collaborated in optimizing patient for discharge. Pt is to continue with oral steroid taper from 40mg with close follow up with PMD and pulm for furtehr care and management    .             Time spent: 65 minutes 79 year old M with PMH HTN, COPD (On home O2 2L and BIPAP at while sleeping regardless of time of day), CAD (w/ 3v CABG 2004), HLD, DM2 (on Metformin), BPH, hx Hypercapnic Resp Failure/Cardiac Arrest requiring intubation (6/18), recently admitted at [facility] from 8/12-8/14 for COPD exacerbation presents to ED with wife for worsening SOB for 2 days. Admitted for acute COPD exacerbation. Pt was given nebs, placed on nocturnal bipap and given steroids. Pt showed clinical improvement and pulmonology collaborated in optimizing patient for discharge. Pt is to continue with oral steroid taper from 40mg with close follow up with PMD and pulm for further care and management. Pt is medically optimized for discharge to home with close follow up for further care and management.                Time spent: 65 minutes

## 2019-08-25 NOTE — DISCHARGE NOTE NURSING/CASE MANAGEMENT/SOCIAL WORK - NSDCDPATPORTLINK_GEN_ALL_CORE
You can access the [facility] Patient Portal, offered by [facility], by registering with the following website: [email or web address]

## 2019-08-25 NOTE — DISCHARGE NOTE PROVIDER - NSDCFUSCHEDAPPT_GEN_ALL_CORE_FT
YUE COTTO ; 09/11/2019 ; NPP PulmMed [id number] ; 09/23/2019 ; NPP Surg Vasc [address] ; 09/23/2019 ; NPP Surg Vasc [address] ; 09/23/2019 ; NPP Surg Vasc [address]

## 2019-08-25 NOTE — DISCHARGE NOTE PROVIDER - NSDCCPCAREPLAN_GEN_ALL_CORE_FT
PRINCIPAL DISCHARGE DIAGNOSIS  Diagnosis: COPD exacerbation  Assessment and Plan of Treatment: you showed improvement with steroids and nebulizations, please cont steroids as prescribed and follow up with your pulmonologist Dr. Dejesus on 9/11/19 as well as your primary Dr. Sarah Avila ACP

## 2019-08-25 NOTE — PROGRESS NOTE ADULT - SUBJECTIVE AND OBJECTIVE BOX
Patient was examined Chart reviewed Detailed note will be inserted below after going over latest data Meanwhile please refer to my previous notes for Pulmonary/Critical Care assessment recommendations COMMODORE TURNER [facility] P [phone]  1940 DOA 2019  ALLERGY Shellfish  CONTACT Sp [facility]      Initial evaluation/Pulmonary Critical Care consultation requested on  2019  by Dr Espinal  from Dr Dejesus   Patient examined chart reviewed    HOSPITAL ADMISSION   PATIENT CAME  FROM (if information available)        TYPE OF VISIT      Subsequent Pulmonary followup     REASON FOR VISIT  PLEASE SEE PROBLEM LIST/ASSESSMENTAND RECOMMENDATIONS     PATIENT COMMODORE TURNER [facility] P [phone]  1940 DOA 2019    VITALS/LABS       2019 afeb 85 101/65 18 99%   2019 W 12.7 Hb 11.5 Plt 235 Na 141 K 4.8 CO2 37 Cr 1.3   2019 pc n     GLOBAL ISSUE/BEST PRACTICE:      PROBLEM: HOB elevation:   y            PROBLEM: Stress ulcer proph:    na                      PROBLEM: VTE prophylaxis:  lvnx 40 )      PROBLEM: Glycemic control:    na  PROBLEM: Nutrition:        cons carb ()           PROBLEM: Advanced directive: na     PROBLEM: Allergies:  shellfish    REVIEW OF SYMPTOMS     NOTE Noteworthy changes  if any  in ROS and PE are also entered  in note  below      Able to give ROS  Yes     RELIABLE No   CONSTITUTIONAL Weakness Yes  Chills No Vision changes No  ENDOCRINE No unexplained hair loss No heat or cold intolerance    ALLERGY No hives  Sore throat No   RESP Coughing blood no  Shortness of breath YES   NEURO No Headache  Confusion Pain neck No   CARDIAC No Chest pain No Palpitations   GI No Pain abdomen NO   Vomiting NO     PHYSICAL EXAM    HEENT Unremarkable PERRLA atraumatic   RESP Fair air entry EXP prolonged    Harsh breath sound Resp distres mild   CARDIAC S1 S2 No S3     NO JVD    ABDOMEN SOFT BS PRESENT NOT DISTENDED No hepatosplenomegaly PEDAL EDEMA present No calf tenderness  NO rash   GENERAL Not TOXIC looking    PATIENT COMMODORE TURNER [facility] P [phone]  1940 DOA 2019                           Patient description                          79 m former smoker PMH HTN, DM2 (on home Metformin and glipizide), COPD (2L home/ BIPAP at night), CAD with 3v CABG , HLD, 10/26/2018 ECHO ef 55% hx hypercapnic resp failure with ho intubation c/b with cardiac arrest (2018) with ho recurrent admissions GOLD D admitted 2019 with progressive sob No sick contacts No fever No travel                                                                                        Hospital course                                  2019 improved

## 2019-08-25 NOTE — PROGRESS NOTE ADULT - ASSESSMENT
79 year old M with PMH HTN, COPD (On home O2 2L and BIPAP at while sleeping regardless of time of day), CAD (w/ 3v CABG 2004), HLD, DM2 (on Metformin), BPH, hx Hypercapnic Resp Failure/Cardiac Arrest requiring intubation (6/18), recently admitted at [facility] from 8/12-8/14 for COPD exacerbation presents to ED with wife for worsening SOB for 2 days. Admitted for acute COPD exacerbation.
PATIENT COMMBRIANNE TURNER [facility] P [phone]  1940 DOA 2019                            PULMONARY/CRITICAL CARE ASSESSMENT/RECOMMENDATIONS (A/R)        ACUTE ON CHRONIC HYPERCAPNIC RESP FAILURE   2019 3l 733/58/123   2019 18/5/.31 729/66/101  A/R  Gas exchange and clinical status improved     RESP TRACT INFECTION   A/R Bact infection unlikely 2019 pc n     COPD ex  A/R  Agree with BD steroids azithro   2019 pt has dog at home Suggested to try having dog live with sonb few m to see if that helps     CAD   On plavix 75 () metoprolol 25 () valsartan 80 ()No ongoing ischemia suspect              TIME SPENT Over 25 minutes aggregate care time spent on encounter; activities included   direct pt care, counseling and/or coordinating care reviewing notes, lab data/ imaging , discussion with multidisciplinary team/ pt /family. Risks, benefits, alternatives  discussed in detail.
PATIENT COMMBRIANNE TURNER [facility] P [phone]  1940 DOA 2019                            PULMONARY/CRITICAL CARE ASSESSMENT/RECOMMENDATIONS (A/R)        ACUTE ON CHRONIC HYPERCAPNIC RESP FAILURE   2019 3l 733/58/123   2019 18/5/.31 729/66/101  A/R  Gas exchange and clinical status improved     RESP TRACT INFECTION   A/R Bact infection unlikely 2019 pc n     COPD ex  A/R  Agree withBD steroids azithro   2019 pt has dog at home Suggested to try having dog live with sonb few m to see if that helps     CAD   On plavix 75 () metoprolol 25 () valsartan 80 ()No ongoing ischemia suspect              TIME SPENT Over 25 minutes aggregate care time spent on encounter; activities included   direct pt care, counseling and/or coordinating care reviewing notes, lab data/ imaging , discussion with multidisciplinary team/ pt /family. Risks, benefits, alternatives  discussed in detail.

## 2019-08-26 LAB
CULTURE RESULTS: SIGNIFICANT CHANGE UP
SPECIMEN SOURCE: SIGNIFICANT CHANGE UP

## 2019-08-29 ENCOUNTER — RX CHANGE (OUTPATIENT)
Age: 79
End: 2019-08-29

## 2019-09-02 PROBLEM — Z09 FOLLOW UP: Status: ACTIVE | Noted: 2018-06-04

## 2019-09-11 ENCOUNTER — APPOINTMENT (OUTPATIENT)
Dept: PULMONOLOGY | Facility: CLINIC | Age: 79
End: 2019-09-11
Payer: MEDICARE

## 2019-09-11 VITALS
SYSTOLIC BLOOD PRESSURE: 118 MMHG | DIASTOLIC BLOOD PRESSURE: 70 MMHG | RESPIRATION RATE: 16 BRPM | HEART RATE: 94 BPM | OXYGEN SATURATION: 92 %

## 2019-09-11 DIAGNOSIS — J96.10 CHRONIC RESPIRATORY FAILURE, UNSPECIFIED WHETHER WITH HYPOXIA OR HYPERCAPNIA: ICD-10-CM

## 2019-09-11 PROCEDURE — 94010 BREATHING CAPACITY TEST: CPT

## 2019-09-11 PROCEDURE — 99214 OFFICE O/P EST MOD 30 MIN: CPT | Mod: 25

## 2019-09-11 NOTE — PHYSICAL EXAM
[General Appearance - Well Developed] : well developed [Normal Appearance] : normal appearance [Well Groomed] : well groomed [No Deformities] : no deformities [General Appearance - Well Nourished] : well nourished [Eyelids - No Xanthelasma] : the eyelids demonstrated no xanthelasmas [Normal Conjunctiva] : the conjunctiva exhibited no abnormalities [General Appearance - In No Acute Distress] : no acute distress [Normal Oropharynx] : normal oropharynx [Neck Cervical Mass (___cm)] : no neck mass was observed [Neck Appearance] : the appearance of the neck was normal [Jugular Venous Distention Increased] : there was no jugular-venous distention [Thyroid Nodule] : there were no palpable thyroid nodules [Thyroid Diffuse Enlargement] : the thyroid was not enlarged [Heart Rate And Rhythm] : heart rate and rhythm were normal [Murmurs] : no murmurs present [Heart Sounds] : normal S1 and S2 [Abnormal Walk] : normal gait [Gait - Sufficient For Exercise Testing] : the gait was sufficient for exercise testing [Cyanosis, Localized] : no localized cyanosis [Nail Clubbing] : no clubbing of the fingernails [] : no ischemic changes [Petechial Hemorrhages (___cm)] : no petechial hemorrhages

## 2019-09-11 NOTE — ASSESSMENT
[FreeTextEntry1] : PATIENT COMMODALAN TURNER MRN [id number]   1940  \par \par PROBLEM ASSESSMENT/RECOMMENDATIONS (A/R) \par COPD \par A/R \par 2019 Wells showed severe obstruction with borderline bronchodilator response \par 2019  Patient had cath and stent done by Dr Hinton 2019 and since then is feeling that his breathing is improved \par 2019 Cont present rx \par 2019 Encouraged to use bpap at night \par 2019 RTC 2 m \par

## 2019-09-11 NOTE — DISCUSSION/SUMMARY
[FreeTextEntry1] : PATIENT COMMODORE TURNER MRN [id number]   1940  \par \par PROBLEMS ASSESSMENT/RECOMMENDATIONS (A/R) \par COPD \par \par BACKGROUND \par 79 m referred 2019 by Dr Avila for COPD \par Patient is  former smoker PMH HTN, DM2 (on home Metformin and glipizide), COPD (2L home/ BIPAP at night), CAD with 3v CABG 2004, HLD, 10/26/2018 ECHO ef 55% hx hypercapnic resp failure with ho intubation c/b with cardiac arrest (2018) with ho recurrent admissions Is COPD GOLD D \par \par Has home O2 Home niv Home neb     (2019)   \par \par Patient has had numerous hospitalizations for his copd ex almost every month latest 2019  \par    \par                            \par \par PATIENT COMMODORE TURNER MRN [id number]   1940  \par \par COPD WORKUP \par LABS \par 2019 w 13.5 Hb 11.2 Plt 207  Na 145 K 4.2 CO2 36 cR 1.1    \par \par ABGs\par 2019 16/6/.3 738/56/143 \par  11p bpap16/6/.3 734/51/133 \par 2019 721/74/112 3l \par \par XR\par 2019 cxr hyperinflated lungs \par \par VTE MOSELEY\par  ddimer n \par 2019  v duplex n\par \par MARIA DE JESUS \par MARI ADE JESUS pre and post 2019 FVC 2.20 58% Post 2.61 69% +18 FEV1 0.86 31% Post 0.89 32% +4%  FEV1% 39%\par MARIA DE JESUS 2019 FVC 2.23 59% FEV1 0.9 33%FEV1% 40% \par \par PATIENT COMMODORE TURNER MRN [id number]   1940 \par \par PROBLEM COPD  MEDS  \par \par 2019   \par Breo 200 \par Incruse \par Daliresp\par Albuterol neb p (2019 Avge 3/d) \par Ventolin p \par Glipizide 2.5x2 \par Prednisone 10 eod (2019) \par \par

## 2019-09-18 ENCOUNTER — INPATIENT (INPATIENT)
Facility: HOSPITAL | Age: 79
LOS: 1 days | Discharge: HOME | DRG: 190 | End: 2019-09-20
Attending: INTERNAL MEDICINE | Admitting: INTERNAL MEDICINE
Payer: COMMERCIAL

## 2019-09-18 VITALS
HEIGHT: 70 IN | RESPIRATION RATE: 20 BRPM | WEIGHT: 199.96 LBS | SYSTOLIC BLOOD PRESSURE: 153 MMHG | HEART RATE: 108 BPM | OXYGEN SATURATION: 98 % | DIASTOLIC BLOOD PRESSURE: 91 MMHG | TEMPERATURE: 99 F

## 2019-09-18 DIAGNOSIS — I73.9 PERIPHERAL VASCULAR DISEASE, UNSPECIFIED: ICD-10-CM

## 2019-09-18 DIAGNOSIS — I25.10 ATHEROSCLEROTIC HEART DISEASE OF NATIVE CORONARY ARTERY WITHOUT ANGINA PECTORIS: ICD-10-CM

## 2019-09-18 DIAGNOSIS — T14.8XXA OTHER INJURY OF UNSPECIFIED BODY REGION, INITIAL ENCOUNTER: Chronic | ICD-10-CM

## 2019-09-18 DIAGNOSIS — J44.1 CHRONIC OBSTRUCTIVE PULMONARY DISEASE WITH (ACUTE) EXACERBATION: ICD-10-CM

## 2019-09-18 DIAGNOSIS — Z95.5 PRESENCE OF CORONARY ANGIOPLASTY IMPLANT AND GRAFT: Chronic | ICD-10-CM

## 2019-09-18 DIAGNOSIS — E11.9 TYPE 2 DIABETES MELLITUS WITHOUT COMPLICATIONS: ICD-10-CM

## 2019-09-18 DIAGNOSIS — Z29.9 ENCOUNTER FOR PROPHYLACTIC MEASURES, UNSPECIFIED: ICD-10-CM

## 2019-09-18 DIAGNOSIS — J96.90 RESPIRATORY FAILURE, UNSPECIFIED, UNSPECIFIED WHETHER WITH HYPOXIA OR HYPERCAPNIA: ICD-10-CM

## 2019-09-18 DIAGNOSIS — I10 ESSENTIAL (PRIMARY) HYPERTENSION: ICD-10-CM

## 2019-09-18 LAB
ALBUMIN SERPL ELPH-MCNC: 3.6 G/DL — SIGNIFICANT CHANGE UP (ref 3.3–5)
ALP SERPL-CCNC: 84 U/L — SIGNIFICANT CHANGE UP (ref 40–120)
ALT FLD-CCNC: 38 U/L — SIGNIFICANT CHANGE UP (ref 12–78)
ANION GAP SERPL CALC-SCNC: 5 MMOL/L — SIGNIFICANT CHANGE UP (ref 5–17)
APTT BLD: 29.9 SEC — SIGNIFICANT CHANGE UP (ref 28.5–37)
AST SERPL-CCNC: 25 U/L — SIGNIFICANT CHANGE UP (ref 15–37)
BASE EXCESS BLDA CALC-SCNC: 0.8 MMOL/L — SIGNIFICANT CHANGE UP (ref -2–2)
BASE EXCESS BLDA CALC-SCNC: 1.5 MMOL/L — SIGNIFICANT CHANGE UP (ref -2–2)
BILIRUB SERPL-MCNC: 0.3 MG/DL — SIGNIFICANT CHANGE UP (ref 0.2–1.2)
BLOOD GAS COMMENTS ARTERIAL: SIGNIFICANT CHANGE UP
BUN SERPL-MCNC: 14 MG/DL — SIGNIFICANT CHANGE UP (ref 7–23)
CALCIUM SERPL-MCNC: 8.8 MG/DL — SIGNIFICANT CHANGE UP (ref 8.5–10.1)
CHLORIDE SERPL-SCNC: 107 MMOL/L — SIGNIFICANT CHANGE UP (ref 96–108)
CK MB CFR SERPL CALC: 2.7 NG/ML — SIGNIFICANT CHANGE UP (ref 0–3.6)
CO2 SERPL-SCNC: 30 MMOL/L — SIGNIFICANT CHANGE UP (ref 22–31)
CREAT SERPL-MCNC: 1.1 MG/DL — SIGNIFICANT CHANGE UP (ref 0.5–1.3)
GLUCOSE SERPL-MCNC: 189 MG/DL — HIGH (ref 70–99)
HCO3 BLDA-SCNC: 24 MMOL/L — SIGNIFICANT CHANGE UP (ref 23–27)
HCO3 BLDA-SCNC: 25 MMOL/L — SIGNIFICANT CHANGE UP (ref 23–27)
HCT VFR BLD CALC: 37 % — LOW (ref 39–50)
HGB BLD-MCNC: 11.2 G/DL — LOW (ref 13–17)
HOROWITZ INDEX BLDA+IHG-RTO: 36 — SIGNIFICANT CHANGE UP
HOROWITZ INDEX BLDA+IHG-RTO: 40 — SIGNIFICANT CHANGE UP
INR BLD: 0.91 RATIO — SIGNIFICANT CHANGE UP (ref 0.88–1.16)
MCHC RBC-ENTMCNC: 27.7 PG — SIGNIFICANT CHANGE UP (ref 27–34)
MCHC RBC-ENTMCNC: 30.3 GM/DL — LOW (ref 32–36)
MCV RBC AUTO: 91.4 FL — SIGNIFICANT CHANGE UP (ref 80–100)
NRBC # BLD: 0 /100 WBCS — SIGNIFICANT CHANGE UP (ref 0–0)
NT-PROBNP SERPL-SCNC: 113 PG/ML — SIGNIFICANT CHANGE UP (ref 0–450)
PCO2 BLDA: 56 MMHG — HIGH (ref 32–46)
PCO2 BLDA: 66 MMHG — HIGH (ref 32–46)
PH BLDA: 7.24 — LOW (ref 7.35–7.45)
PH BLDA: 7.31 — LOW (ref 7.35–7.45)
PLATELET # BLD AUTO: 275 K/UL — SIGNIFICANT CHANGE UP (ref 150–400)
PO2 BLDA: 170 MMHG — HIGH (ref 74–108)
PO2 BLDA: 80 MMHG — SIGNIFICANT CHANGE UP (ref 74–108)
POTASSIUM SERPL-MCNC: 4.5 MMOL/L — SIGNIFICANT CHANGE UP (ref 3.5–5.3)
POTASSIUM SERPL-SCNC: 4.5 MMOL/L — SIGNIFICANT CHANGE UP (ref 3.5–5.3)
PROT SERPL-MCNC: 6.8 G/DL — SIGNIFICANT CHANGE UP (ref 6–8.3)
PROTHROM AB SERPL-ACNC: 10.4 SEC — SIGNIFICANT CHANGE UP (ref 10–12.9)
RBC # BLD: 4.05 M/UL — LOW (ref 4.2–5.8)
RBC # FLD: 13.1 % — SIGNIFICANT CHANGE UP (ref 10.3–14.5)
SAO2 % BLDA: 100 % — HIGH (ref 92–96)
SAO2 % BLDA: 93 % — SIGNIFICANT CHANGE UP (ref 92–96)
SODIUM SERPL-SCNC: 142 MMOL/L — SIGNIFICANT CHANGE UP (ref 135–145)
TROPONIN I SERPL-MCNC: <.015 NG/ML — SIGNIFICANT CHANGE UP (ref 0.01–0.04)
TROPONIN I SERPL-MCNC: <.015 NG/ML — SIGNIFICANT CHANGE UP (ref 0.01–0.04)
WBC # BLD: 10.48 K/UL — SIGNIFICANT CHANGE UP (ref 3.8–10.5)
WBC # FLD AUTO: 10.48 K/UL — SIGNIFICANT CHANGE UP (ref 3.8–10.5)

## 2019-09-18 PROCEDURE — 99291 CRITICAL CARE FIRST HOUR: CPT

## 2019-09-18 PROCEDURE — 93010 ELECTROCARDIOGRAM REPORT: CPT

## 2019-09-18 PROCEDURE — 71045 X-RAY EXAM CHEST 1 VIEW: CPT | Mod: 26

## 2019-09-18 RX ORDER — AZITHROMYCIN 500 MG/1
500 TABLET, FILM COATED ORAL ONCE
Refills: 0 | Status: COMPLETED | OUTPATIENT
Start: 2019-09-18 | End: 2019-09-18

## 2019-09-18 RX ORDER — INFLUENZA VIRUS VACCINE 15; 15; 15; 15 UG/.5ML; UG/.5ML; UG/.5ML; UG/.5ML
0.5 SUSPENSION INTRAMUSCULAR ONCE
Refills: 0 | Status: DISCONTINUED | OUTPATIENT
Start: 2019-09-18 | End: 2019-09-20

## 2019-09-18 RX ORDER — AZITHROMYCIN 500 MG/1
500 TABLET, FILM COATED ORAL EVERY 24 HOURS
Refills: 0 | Status: DISCONTINUED | OUTPATIENT
Start: 2019-09-19 | End: 2019-09-20

## 2019-09-18 RX ORDER — INSULIN LISPRO 100/ML
VIAL (ML) SUBCUTANEOUS
Refills: 0 | Status: DISCONTINUED | OUTPATIENT
Start: 2019-09-18 | End: 2019-09-20

## 2019-09-18 RX ORDER — ACETAMINOPHEN 500 MG
650 TABLET ORAL EVERY 6 HOURS
Refills: 0 | Status: DISCONTINUED | OUTPATIENT
Start: 2019-09-18 | End: 2019-09-20

## 2019-09-18 RX ORDER — MAGNESIUM SULFATE 500 MG/ML
2 VIAL (ML) INJECTION ONCE
Refills: 0 | Status: COMPLETED | OUTPATIENT
Start: 2019-09-18 | End: 2019-09-18

## 2019-09-18 RX ORDER — IPRATROPIUM/ALBUTEROL SULFATE 18-103MCG
3 AEROSOL WITH ADAPTER (GRAM) INHALATION EVERY 6 HOURS
Refills: 0 | Status: DISCONTINUED | OUTPATIENT
Start: 2019-09-18 | End: 2019-09-20

## 2019-09-18 RX ORDER — ENOXAPARIN SODIUM 100 MG/ML
40 INJECTION SUBCUTANEOUS DAILY
Refills: 0 | Status: DISCONTINUED | OUTPATIENT
Start: 2019-09-18 | End: 2019-09-20

## 2019-09-18 RX ORDER — INSULIN LISPRO 100/ML
VIAL (ML) SUBCUTANEOUS AT BEDTIME
Refills: 0 | Status: DISCONTINUED | OUTPATIENT
Start: 2019-09-18 | End: 2019-09-20

## 2019-09-18 RX ORDER — CLOPIDOGREL BISULFATE 75 MG/1
75 TABLET, FILM COATED ORAL DAILY
Refills: 0 | Status: DISCONTINUED | OUTPATIENT
Start: 2019-09-18 | End: 2019-09-20

## 2019-09-18 RX ORDER — SODIUM CHLORIDE 9 MG/ML
500 INJECTION INTRAMUSCULAR; INTRAVENOUS; SUBCUTANEOUS ONCE
Refills: 0 | Status: COMPLETED | OUTPATIENT
Start: 2019-09-18 | End: 2019-09-18

## 2019-09-18 RX ORDER — TAMSULOSIN HYDROCHLORIDE 0.4 MG/1
0.4 CAPSULE ORAL AT BEDTIME
Refills: 0 | Status: DISCONTINUED | OUTPATIENT
Start: 2019-09-18 | End: 2019-09-20

## 2019-09-18 RX ORDER — IPRATROPIUM/ALBUTEROL SULFATE 18-103MCG
3 AEROSOL WITH ADAPTER (GRAM) INHALATION
Refills: 0 | Status: COMPLETED | OUTPATIENT
Start: 2019-09-18 | End: 2019-09-18

## 2019-09-18 RX ORDER — GLUCAGON INJECTION, SOLUTION 0.5 MG/.1ML
1 INJECTION, SOLUTION SUBCUTANEOUS ONCE
Refills: 0 | Status: DISCONTINUED | OUTPATIENT
Start: 2019-09-18 | End: 2019-09-20

## 2019-09-18 RX ORDER — SODIUM CHLORIDE 9 MG/ML
1000 INJECTION, SOLUTION INTRAVENOUS
Refills: 0 | Status: DISCONTINUED | OUTPATIENT
Start: 2019-09-18 | End: 2019-09-20

## 2019-09-18 RX ORDER — ASPIRIN/CALCIUM CARB/MAGNESIUM 324 MG
81 TABLET ORAL DAILY
Refills: 0 | Status: DISCONTINUED | OUTPATIENT
Start: 2019-09-18 | End: 2019-09-20

## 2019-09-18 RX ORDER — DEXTROSE 50 % IN WATER 50 %
25 SYRINGE (ML) INTRAVENOUS ONCE
Refills: 0 | Status: DISCONTINUED | OUTPATIENT
Start: 2019-09-18 | End: 2019-09-20

## 2019-09-18 RX ORDER — ATORVASTATIN CALCIUM 80 MG/1
40 TABLET, FILM COATED ORAL AT BEDTIME
Refills: 0 | Status: DISCONTINUED | OUTPATIENT
Start: 2019-09-18 | End: 2019-09-20

## 2019-09-18 RX ORDER — BUDESONIDE, MICRONIZED 100 %
0.5 POWDER (GRAM) MISCELLANEOUS
Refills: 0 | Status: DISCONTINUED | OUTPATIENT
Start: 2019-09-18 | End: 2019-09-20

## 2019-09-18 RX ORDER — LOSARTAN POTASSIUM 100 MG/1
50 TABLET, FILM COATED ORAL DAILY
Refills: 0 | Status: DISCONTINUED | OUTPATIENT
Start: 2019-09-18 | End: 2019-09-20

## 2019-09-18 RX ORDER — PANTOPRAZOLE SODIUM 20 MG/1
40 TABLET, DELAYED RELEASE ORAL
Refills: 0 | Status: DISCONTINUED | OUTPATIENT
Start: 2019-09-18 | End: 2019-09-20

## 2019-09-18 RX ORDER — AZITHROMYCIN 500 MG/1
TABLET, FILM COATED ORAL
Refills: 0 | Status: DISCONTINUED | OUTPATIENT
Start: 2019-09-18 | End: 2019-09-20

## 2019-09-18 RX ORDER — DEXTROSE 50 % IN WATER 50 %
15 SYRINGE (ML) INTRAVENOUS ONCE
Refills: 0 | Status: DISCONTINUED | OUTPATIENT
Start: 2019-09-18 | End: 2019-09-20

## 2019-09-18 RX ORDER — DEXTROSE 50 % IN WATER 50 %
12.5 SYRINGE (ML) INTRAVENOUS ONCE
Refills: 0 | Status: DISCONTINUED | OUTPATIENT
Start: 2019-09-18 | End: 2019-09-20

## 2019-09-18 RX ORDER — METOPROLOL TARTRATE 50 MG
12.5 TABLET ORAL EVERY 12 HOURS
Refills: 0 | Status: DISCONTINUED | OUTPATIENT
Start: 2019-09-18 | End: 2019-09-20

## 2019-09-18 RX ADMIN — Medication 3 MILLILITER(S): at 12:12

## 2019-09-18 RX ADMIN — Medication 125 MILLIGRAM(S): at 12:12

## 2019-09-18 RX ADMIN — ATORVASTATIN CALCIUM 40 MILLIGRAM(S): 80 TABLET, FILM COATED ORAL at 23:23

## 2019-09-18 RX ADMIN — AZITHROMYCIN 255 MILLIGRAM(S): 500 TABLET, FILM COATED ORAL at 16:12

## 2019-09-18 RX ADMIN — Medication 600 MILLIGRAM(S): at 18:14

## 2019-09-18 RX ADMIN — Medication 2: at 23:08

## 2019-09-18 RX ADMIN — Medication 12.5 MILLIGRAM(S): at 18:15

## 2019-09-18 RX ADMIN — Medication 50 GRAM(S): at 13:06

## 2019-09-18 RX ADMIN — Medication 3 MILLILITER(S): at 12:11

## 2019-09-18 RX ADMIN — Medication 3 MILLILITER(S): at 20:24

## 2019-09-18 RX ADMIN — Medication 40 MILLIGRAM(S): at 23:23

## 2019-09-18 RX ADMIN — Medication 2 GRAM(S): at 14:42

## 2019-09-18 RX ADMIN — SODIUM CHLORIDE 500 MILLILITER(S): 9 INJECTION INTRAMUSCULAR; INTRAVENOUS; SUBCUTANEOUS at 13:06

## 2019-09-18 RX ADMIN — SODIUM CHLORIDE 500 MILLILITER(S): 9 INJECTION INTRAMUSCULAR; INTRAVENOUS; SUBCUTANEOUS at 14:43

## 2019-09-18 RX ADMIN — Medication 3: at 17:59

## 2019-09-18 RX ADMIN — TAMSULOSIN HYDROCHLORIDE 0.4 MILLIGRAM(S): 0.4 CAPSULE ORAL at 23:23

## 2019-09-18 NOTE — H&P ADULT - ATTENDING COMMENTS
78 yo AAM presents with SOB  since this this morning.  Pt. has a history of COPD with frequent  copd exacerbation.  No fever or inciting event.  Notes that he had stents placed 2 weeks ago at [facility] .  Diagnosed with COPD EXACERBATION AND ACUTE  RESPIRATORY FAILURE ,placed on BIPAP , Admit for intravenous steroids nebulized bronchodilators and pulmonology evaluation,O2 supply, serial labs and chest xrays ,obtain ECHO to evaluate LVEF and rule out chf component ROS: negative for fever, headache, chest pain, abd pain, nausea, vomiting, diarrhea, rash, paresthesia, and weakness--all other systems reviewed are negative Admitted  to telemetry unit for monitoring , send 3 sets of cardiac ensymes to rule out coronary event, obtain ECHO to evaluate LVEF, cardiology consult ,continue current management, O2 supply, anticoagulation plan as per cardiology consult PMH: HTN, COPD (On home O2 2L and BIPAP at while sleeping regardless of time of day), CAD (w/ 3v CABG 2004), HLD, DM2 (on Metformin), BPH, 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

## 2019-09-18 NOTE — CONSULT NOTE ADULT - ASSESSMENT
pt with exacebation of copd  on bipap  steroids and bronchodilator  dyslipidemia  dm2 - on insulin coverage  hypertension
PATIENT COMMBRIANNE TURNER [facility] P [phone]  1940 DOA  2019                           PULMONARY/CRITICAL CARE ASSESSMENT/RECOMMENDATIONS LIST                                         ACUTE ON CHRONIC HYPERCAPNIC RESP FAILURE   Use bpap Monitor abg If fails to improve will need icu admission and may need intubation (2019)     RESP TRACT INFECTION   2019 Start azithro after cultures and check pc n    COPD ex  A/RAgree with BD steroids azithro     CAD   Continue  plavix 75 () metoprolol 25 () valsartan 80 () No ongoing ischemia suspect            TIME SPENT Over 55 minutes aggregate care time spent on encounter; activities included   direct pt care, counseling and/or coordinating care reviewing notes, lab data/ imaging , discussion with multidisciplinary team/ pt /family. Risks, benefits, alternatives  discussed in detail.

## 2019-09-18 NOTE — ED ADULT NURSE NOTE - CHPI ED NUR SYMPTOMS NEG
no body aches/no edema/no shortness of breath/no hemoptysis/no chills/no diaphoresis/no headache/no fever

## 2019-09-18 NOTE — ED ADULT TRIAGE NOTE - CHIEF COMPLAINT QUOTE
Patient c/o difficulty breathing today patient has hx of copd, recently has 2 stents placed 2 weeks ago at [place]

## 2019-09-18 NOTE — ED PROVIDER NOTE - PHYSICAL EXAMINATION
Vitals: tachy at 108, htn at 153/91, otherwise WNL, afebrile  Gen: AAOx3, NAD, sitting comfortably in stretcher  Head: ncat, perrla, eomi b/l  Neck: supple, no lymphadenopathy, no midline deviation  Heart: rrr, no m/r/g  Lungs: diffuse b/l expiratory wheezing  Abd: soft, nontender, non-distended, no rebound or guarding  Ext: no clubbing/cyanosis/edema  Neuro: sensation and muscle strength intact b/l, steady gait Vitals: tachy at 108, htn at 153/91, otherwise WNL, afebrile  Gen: AAOx3, initially short of breath, speaking in short sentences, no retractions   Head: ncat, perrla, eomi b/l  Neck: supple, no lymphadenopathy, no midline deviation  Heart: rrr, no m/r/g  Lungs: diffuse b/l expiratory wheezing  Abd: soft, nontender, non-distended, no rebound or guarding  Ext: no clubbing/cyanosis/edema  Neuro: sensation and muscle strength intact b/l, steady gait

## 2019-09-18 NOTE — H&P ADULT - ASSESSMENT
80 yo AAM presents with SOB  since this this morning.  Pt. has a history of COPD with frequent  copd exacerbation.  No fever or inciting event.  Notes that he had stents placed 2 weeks ago at [facility] .  Diagnosed with COPD EXACERBATION AND ACUTE  RESPIRATORY FAILURE ,placed on BIPAP , Admit for intravenous steroids nebulized bronchodilators and pulmonology evaluation,O2 supply, serial labs and chest xrays ,obtain ECHO to evaluate LVEF and rule out chf component ROS: negative for fever, headache, chest pain, abd pain, nausea, vomiting, diarrhea, rash, paresthesia, and weakness--all other systems reviewed are negative Admitted  to telemetry unit for monitoring , send 3 sets of cardiac ensymes to rule out coronary event, obtain ECHO to evaluate LVEF, cardiology consult ,continue current management, O2 supply, anticoagulation plan as per cardiology consult PMH: HTN, COPD (On home O2 2L and BIPAP at while sleeping regardless of time of day), CAD (w/ 3v CABG 2004), HLD, DM2 (on Metformin), BPH, 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

## 2019-09-18 NOTE — H&P ADULT - NEGATIVE MUSCULOSKELETAL SYMPTOMS
no arm pain L/no arthralgia/no joint swelling/no myalgia/no muscle cramps/no arm pain R/no arthritis

## 2019-09-18 NOTE — H&P ADULT - NSICDXPASTSURGICALHX_GEN_ALL_CORE_FT
PAST SURGICAL HISTORY:  CABG (Coronary Artery Bypass Graft) 2004    Compound fracture left leg    S/P primary angioplasty with coronary stent

## 2019-09-18 NOTE — H&P ADULT - NSICDXPASTMEDICALHX_GEN_ALL_CORE_FT
PAST MEDICAL HISTORY:  CAD (Coronary Artery Disease) s/p 3v CABG 2004    COPD (chronic obstructive pulmonary disease) on 2L at home and BiPAP at night; intubated 6/18    Diabetes Mellitus, Type II     Dyslipidemia     Hypertension     Osteomyelitis     PVD (peripheral vascular disease)

## 2019-09-18 NOTE — H&P ADULT - PROBLEM SELECTOR PLAN 1
Admit for ,intravenous steroids,nebulised bronchodilators and pulmonology evaluation,O2 supply,serial labs and chest xrays,obtain ECHO to evaluate LVEF and rule out chf component

## 2019-09-18 NOTE — ED PROVIDER NOTE - OBJECTIVE STATEMENT
80 yo M with sob since this morning.  Pt. has a history of COPD, currently feels like he's having a copd exacerbation.  No fever or inciting event.  Pt. was well, yesterday.  Notes that he had stents placed 2 weeks ago at [place].  No other complaints, currently.  ROS: negative for fever, headache, chest pain, abd pain, nausea, vomiting, diarrhea, rash, paresthesia, and weakness--all other systems reviewed are negative.   PMH: HTN, COPD (On home O2 2L and BIPAP at while sleeping regardless of time of day), CAD (w/ 3v CABG 2004), HLD, DM2 (on Metformin), BPH, hx Hypercapnic Resp Failure/Cardiac Arrest requiring intubation (6/18); Meds: See EMR for list; SH: Denies smoking/drinking/drug use

## 2019-09-18 NOTE — ED ADULT NURSE NOTE - CHIEF COMPLAINT QUOTE
Patient c/o difficulty breathing today patient has hx of copd, recently has 2 stents placed 2 weeks ago at [place]

## 2019-09-18 NOTE — H&P ADULT - HISTORY OF PRESENT ILLNESS
80 yo AAM presents with SOB  since this this morning.  Pt. has a history of COPD with frequent  copd exacerbation.  No fever or inciting event.  Notes that he had stents placed 2 weeks ago at [facility] .  Diagnosed with COPD EXACERBATION AND ACUTE  RESPIRATORY FAILURE ,placed on BIPAP , Admit for intravenous steroids nebulized bronchodilators and pulmonology evaluation,O2 supply, serial labs and chest xrays ,obtain ECHO to evaluate LVEF and rule out chf component ROS: negative for fever, headache, chest pain, abd pain, nausea, vomiting, diarrhea, rash, paresthesia, and weakness--all other systems reviewed are negative Admitted  to telemetry unit for monitoring , send 3 sets of cardiac ensymes to rule out coronary event, obtain ECHO to evaluate LVEF, cardiology consult ,continue current management, O2 supply, anticoagulation plan as per cardiology consult PMH: HTN, COPD (On home O2 2L and BIPAP at while sleeping regardless of time of day), CAD (w/ 3v CABG 2004), HLD, DM2 (on Metformin), BPH, 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

## 2019-09-18 NOTE — CONSULT NOTE ADULT - SUBJECTIVE AND OBJECTIVE BOX
chart reviewed Detailed note to follow [facility] P [phone]  1940 DOA  2019  ALLERGY Shellfish  CONTACT Oumar EATON      Initial evaluation/Pulmonary Critical Care consultation requested on 2019   by Dr Mar   from Dr Dejesus   Patient examined chart reviewed    HOSPITAL ADMISSION   PATIENT CAME  FROM (if information available)      PATIENT [facility] P [phone]  1940 DOA  2019                          PT DESCRIPTION       This section was excerpted from ER md note but was independently verified by me    · Chief Complaint: The patient is a 79y Male complaining of difficulty breathing.  · HPI Objective Statement: 78 yo M with sob since this morning.  Pt. has a history of COPD, currently feels like he's having a copd exacerbation.  No fever or inciting event.  Pt. was well, yesterday.  Notes that he had stents placed 2 weeks ago at [place].  No other complaints, currently.  ROS: negative for fever, headache, chest pain, abd pain, nausea, vomiting, diarrhea, rash, paresthesia, and weakness--all other systems reviewed are negative.   PMH: HTN, COPD (On home O2 2L and BIPAP at while sleeping regardless of time of day), CAD (w/ 3v CABG ), HLD, DM2 (on Metformin), BPH, hx Hypercapnic Resp Failure/Cardiac Arrest requiring intubation (); Meds: See EMR for list; SH: Denies smoking/drinking/drug use    PAST MEDICAL/SURGICAL/FAMILY/SOCIAL HISTORY:    Past Medical History:  CAD (Coronary Artery Disease)  s/p 3v CABG   COPD (chronic obstructive pulmonary disease)  on 2L at home and BiPAP at night; intubated   Diabetes Mellitus, Type II    Dyslipidemia    Hypertension    Osteomyelitis    PVD (peripheral vascular disease).     Past Surgical History:  CABG (Coronary Artery Bypass Graft)    Compound fracture  left leg.     Tobacco Usage:  · Tobacco Usage Never smoker    ALLERGIES AND HOME MEDICATIONS:   Allergies:        Allergies:  No Known Allergies:        Intolerances:  shellfish: Food, Nausea, feels nauseous when eating crabs or shrimp but no true allergic reaction    Home Medications:   * Patient Currently Takes Medications as of 25-Aug-2019 12:37 documented in Structured Notes  · azithromycin 500 mg oral tablet: 1 tab(s) orally once a day  · ipratropium-albuterol 0.5 mg-2.5 mg/3 mLinhalation solution: 3 milliliter(s) inhaled every 6 hours, As Needed -for shortness of breath and/or wheezing   · predniSONE 10 mg oral tablet: take 4 tablets orally daily and decrease by 1 tablet every three days until one tablet daily, continue 1 tablet daily there on forward   · valsartan 80 mg oral tablet: 1 tab(s) orally once a day  · clopidogrel 75 mg oral tablet: 1 tab(s) orally once a day  · atorvastatin 40 mg oral tablet: 1 tab(s) orally once a day  · tamsulosin 0.4 mg oral capsule: 1 cap(s) orally once a day (at bedtime)  · Ventolin HFA 90 mcg/inh inhalation aerosol: 2 puff(s) inhaled 4 times a day, As Needed  · Breo Ellipta 100 mcg-25 mcg/inh inhalation powder: 1 puff(s) inhaled once a day  · metFORMIN 1000 mg oral tablet: 1 tab(s) orally 2 times a day  · Incruse Ellipta 62.5 mcg/inh inhalation powder: 1 puff(s) inhaled once a day  · glipiZIDE 2.5 mg oral tablet, extended release: 1 tab(s) orally 2 times a day  · Metoprolol Tartrate 25 mg oral tablet: 1 tablet orally once a day, twice a day if BP is high     PHYSICAL EXAM:   · Physical Examination: Vitals: tachy at 108, htn at 153/91, otherwise WNL, afebrile  Gen: AAOx3, initially short of breath, speaking in short sentences, no retractions   Head: ncat, perrla, eomi b/l  Neck: supple, no lymphadenopathy, no midline deviation  Heart: rrr, no m/r/g  Lungs: diffuse b/l expiratory wheezing  Abd: soft, nontender, non-distended, no rebound or guarding  Ext: no clubbing/cyanosis/edema  Neuro: sensation and muscle strength intact b/l, steady gait                PATIENT [facility] P [phone]  1940 DOA  2019    VITALS/LABS       2019 153/91 108 20   2019 1p 18/6/.6  2019 W 10.4 Hb 11.2 Plt 275 INR .9 Na 142 K 4.5 CO2 30 Cr 1.1  2019 Tr 1 n   2019 bnp 113   2019 724/66/80   2019cxr nsc 2019           REVIEW OF SYMPTOMS     NOTE Noteworthy changes  if any  in ROS and PE are also entered  in note  below      Able to give ROS  Yes     RELIABLE No   CONSTITUTIONAL Weakness Yes  Chills No Vision changes No  ENDOCRINE No unexplained hair loss No heat or cold intolerance    ALLERGY No hives  Sore throat No   RESP Coughing blood no  Shortness of breath YES   NEURO No Headache  Confusion Pain neck No   CARDIAC No Chest pain No Palpitations   GI No Pain abdomen NO   Vomiting NO     PHYSICAL EXAM    HEENT Unremarkable PERRLA atraumatic   RESP Fair air entry EXP prolonged    Harsh breath sound Resp distres mild   CARDIAC S1 S2 No S3     NO JVD    ABDOMEN SOFT BS PRESENT NOT DISTENDED No hepatosplenomegaly PEDAL EDEMA present No calf tenderness  NO rash   GENERAL Not TOXIC looking    REVIEW OF SYMPTOMS     NOTE Noteworthy changes  if any  in ROS and PE are also entered  in note  below      Able to give ROS  Yes     RELIABLE No   CONSTITUTIONAL Weakness Yes  Chills No Vision changes No  ENDOCRINE No unexplained hair loss No heat or cold intolerance    ALLERGY No hives  Sore throat No   RESP Coughing blood no  Shortness of breath YES   NEURO No Headache  Confusion Pain neck No   CARDIAC No Chest pain No Palpitations   GI No Pain abdomen NO   Vomiting NO     PHYSICAL EXAM    HEENT Unremarkable PERRLA atraumatic   RESP Fair air entry EXP prolonged    Harsh breath sound Resp distres mild   CARDIAC S1 S2 No S3     NO JVD    ABDOMEN SOFT BS PRESENT NOT DISTENDED No hepatosplenomegaly PEDAL EDEMA present No calf tenderness  NO rash   GENERAL Not TOXIC looking     PATIENT [facility] P [phone]  1940 DOA  2019                            PATIENT DATA     ALLERGY               nka    possibly shellfish                                                                                                       HEAD OF BED ELEVATION Yes   DVT PROPHYLAXIS              lvnx 40 ()                    DIET                                   cons carb ()                                        ACUTE ON CHRONIC HYPERCAPNIC RESP FAILURE   2019 724/66/80     RESP TRACT INFECTION   2019 W 10.4   2019cxr nsc 2019     COPD ex  2019 pt has dog at home Suggested to try having dog live with sonb few m to see if that helps     CAD   sp stent 2019 Tr 1 n     RO CHF  3/12/2019 echo ef 55% dd1 Interatrial septum aneurysm  pasp 29   2019 bnp 113

## 2019-09-18 NOTE — ED ADULT NURSE NOTE - PMH
CAD (Coronary Artery Disease)  s/p 3v CABG 2004  COPD (chronic obstructive pulmonary disease)  on 2L at home and BiPAP at night; intubated 6/18  Diabetes Mellitus, Type II    Dyslipidemia    Hypertension    Osteomyelitis    PVD (peripheral vascular disease)

## 2019-09-18 NOTE — H&P ADULT - NSHPLABSRESULTS_GEN_ALL_CORE
< from: Xray Chest 1 View AP/PA (08.23.19 @ 20:46) >  EXAM:  XR CHEST AP OR PA 1V                        PROCEDURE DATE:  08/23/2019    INTERPRETATION:  Clinical information: Sepsis  Portable exam, 8:37 PM  Comparison exam dated 8/12/2019  Cardiac monitor leads overlie the thorax. Sternal sutures present.   Several of the sternal sutures in the upper sternal region are broken.  The lungs demonstrate emphysematous changes, the diaphragm is flattened.  No lobar consolidation vascular congestion or effusion. Heart size within   normal limits. Hilar regions, mediastinal contours intact, stable since   prior exam. No acute osseous abnormalities.  IMPRESSION:  See above report  < end of copied text >  < from: TTE Echo Doppler w/o Cont (03.12.19 @ 20:42) >   EXAM:  ECHO TTE WO CON COMP W DOPPLR    PROCEDURE DATE:  03/12/2019   INTERPRETATION:  Ordering Physician: MARTÍN CARRASQUILLO [id number]  Indication: Shortness of breath  Study Quality: Technically difficult   A complete echocardiographic study was performed utilizing standard   protocol including spectral and color Doppler in all echocardiographic   windows.    Height: 175 cm  Weight: 113 kg  BSA: 2.2 sq m  Blood Pressure: 103/61    MEASUREMENTS  IVS: 1.5cm  PWT: 1.1cm  LA: 3.1cm  AO: 3.3cm  LVIDd: 4.2cm  LVIDs: 3.3cm    RVSP: 29mmHg    FINDINGS  Left Ventricle: The endocardium is not well-visualized. In limited views,   the LV function appears to be preserved with an estimated EF of 55%.   There is paradoxical motion of the septum in the setting of prior cardiac   surgery.  Aortic Valve: Calcified aortic valve with normal opening. Trace aortic   regurgitation  Mitral Valve: Thickened and calcified mitral valve leaflets with trace to   mild mitral regurgitation  Tricuspid Valve: Grossly normal tricuspid valve. Mild tricuspid   regurgitation.  Pulmonic Valve: Not well-visualized.  Left Atrium: Grossly normal. An interatrial septal aneurysm is noted   without obvious color flow across it  Right Ventricle: In limited views, grossly normal RV size.  Right Atrium: Grossly normal  Diastolic Function: Doppler evidence of rate 1 diastolic dysfunction  Pericardium/Pleura: No significant pericardial effusion.  Hemodynamics: The pulmonary artery systolic pressure is estimated to be   29 mmHg, assuming the right atrial pressure is equal to 8 mmHg,   consistent with normal pulmonary pressures.    CONCLUSIONS:  1. Technically difficult and limited study  2. The endocardium is not well-visualized. In limited views, the LV   function appears to be preserved with an estimated EF of 55%. There is   paradoxical motion of the septum in the setting of prior cardiac surgery.  3. Calcified aortic valve with normal opening. Trace aortic regurgitation  4. Thickened and calcified mitral valve leaflets with trace to mild   mitral regurgitation  5. Doppler evidence of rate 1 diastolic dysfunction  6. The interatrial septum appears aneurysmal  7. No significant pericardial effusion.    < end of copied text >

## 2019-09-18 NOTE — ED ADULT NURSE NOTE - OBJECTIVE STATEMENT
pt reports yesterday being in his usual state of  health and today reports SOB.  b/l expiratory wheeze noted.  -fever -lower extremity edema noted.  pt denies any fever. pt reports yesterday being in his usual state of  health and today reports SOB.  b/l expiratory wheeze noted.  -fever -lower extremity edema noted.  pt denies any fever. +accessory muscles in use

## 2019-09-18 NOTE — CONSULT NOTE ADULT - SUBJECTIVE AND OBJECTIVE BOX
CARDIOLOGY CONSULT NOTE    Patient is a 79y Male with a known history of : admitted with increasing sob for few days   s/p coronary stent  2 weekks ago  ?lad   s/p cabg 2004    HPI:      REVIEW OF SYSTEMS:    CONSTITUTIONAL: No fever, weight loss, or fatigue  EYES: No eye pain, visual disturbances, or discharge  ENMT:  No difficulty hearing, tinnitus, vertigo; No sinus or throat pain  NECK: No pain or stiffness  BREASTS: No pain, masses, or nipple discharge  RESPIRATORY: No cough, having wheezing, no chills or hemoptysis;  having  shortness of breath  CARDIOVASCULAR: No chest pain, palpitations, dizziness, or leg swelling  GASTROINTESTINAL: No abdominal or epigastric pain. No nausea, vomiting, or hematemesis; No diarrhea or constipation. No melena or hematochezia.  GENITOURINARY: No dysuria, frequency, hematuria, or incontinence  NEUROLOGICAL: No headaches, memory loss, loss of strength, numbness, or tremors  SKIN: No itching, burning, rashes, or lesions   LYMPH NODES: No enlarged glands  ENDOCRINE: No heat or cold intolerance; No hair loss  MUSCULOSKELETAL: No joint pain or swelling; No muscle, back, or extremity pain  PSYCHIATRIC: No depression, anxiety, mood swings, or difficulty sleeping  HEME/LYMPH: No easy bruising, or bleeding gums  ALLERGY AND IMMUNOLOGIC: No hives or eczema    MEDICATIONS  (STANDING):  ALBUTerol/ipratropium for Nebulization 3 milliLiter(s) Nebulizer every 6 hours  aspirin enteric coated 81 milliGRAM(s) Oral daily  atorvastatin 40 milliGRAM(s) Oral at bedtime  buDESOnide    Inhalation Suspension 0.5 milliGRAM(s) Inhalation two times a day  clopidogrel Tablet 75 milliGRAM(s) Oral daily  dextrose 5%. 1000 milliLiter(s) (50 mL/Hr) IV Continuous <Continuous>  dextrose 50% Injectable 12.5 Gram(s) IV Push once  dextrose 50% Injectable 25 Gram(s) IV Push once  dextrose 50% Injectable 25 Gram(s) IV Push once  enoxaparin Injectable 40 milliGRAM(s) SubCutaneous daily  insulin lispro (HumaLOG) corrective regimen sliding scale   SubCutaneous three times a day before meals  insulin lispro (HumaLOG) corrective regimen sliding scale   SubCutaneous at bedtime  losartan 50 milliGRAM(s) Oral daily  methylPREDNISolone sodium succinate Injectable 40 milliGRAM(s) IV Push every 8 hours  tamsulosin 0.4 milliGRAM(s) Oral at bedtime    MEDICATIONS  (PRN):  acetaminophen   Tablet .. 650 milliGRAM(s) Oral every 6 hours PRN Temp greater or equal to 38C (100.4F), Mild Pain (1 - 3)  dextrose 40% Gel 15 Gram(s) Oral once PRN Blood Glucose LESS THAN 70 milliGRAM(s)/deciliter  glucagon  Injectable 1 milliGRAM(s) IntraMuscular once PRN Glucose LESS THAN 70 milligrams/deciliter      ALLERGIES: No Known Allergies      Social History: quit smoking  many yrs ago    FAMILY HISTORY:  Family history of diabetes mellitus (Sibling)      PAST MEDICAL & SURGICAL HISTORY:  PVD (peripheral vascular disease)  Hypertension  COPD (chronic obstructive pulmonary disease): on 2L at home and BiPAP at night; intubated 6/18  Osteomyelitis  Dyslipidemia  CAD (Coronary Artery Disease): s/p 3v CABG 2004  Diabetes Mellitus, Type II  Compound fracture: left leg  CABG (Coronary Artery Bypass Graft): 2004        PHYSICAL EXAMINATION:  -----------------------------  T(C): 37.2 (09-18-19 @ 11:23), Max: 37.2 (09-18-19 @ 11:23)  HR: 108 (09-18-19 @ 13:00) (102 - 108)  BP: 130/64 (09-18-19 @ 11:55) (130/64 - 153/91)  RR: 20 (09-18-19 @ 11:55) (20 - 20)  SpO2: 99% (09-18-19 @ 13:00) (98% - 100%)  Wt(kg): --        Constitutional: well developed, normal appearance, well groomed, well nourished, no deformities and no acute distress.   Eyes: the conjunctiva exhibited no abnormalities and the eyelids demonstrated no xanthelasmas.   HEENT: normal oral mucosa, no oral pallor and no oral cyanosis.   Neck: normal jugular venous A waves present, normal jugular venous V waves present and no jugular venous cummins A waves.   Pulmonary: no respiratory distress, normal respiratory rhythm and effort, no accessory muscle use and lungs were clear to auscultation bilaterally.   Cardiovascular: heart rate and rhythm were normal, normal S1 and S2 and no murmur, gallop, rub, heave or thrill are present.   Abdomen: soft, non-tender, no hepato-splenomegaly and no abdominal mass palpated.   Musculoskeletal: the gait could not be assessed..   Extremities: no clubbing of the fingernails, no localized cyanosis, no petechial hemorrhages and no ischemic changes.   Skin: normal skin color and pigmentation, no rash, no venous stasis, no skin lesions, no skin ulcer and no xanthoma was observed.   Psychiatric: oriented to person, place, and time, the affect was normal, the mood was normal and not feeling anxious.     LABS:   --------  09-18    142  |  107  |  14  ----------------------------<  189<H>  4.5   |  30  |  1.10    Ca    8.8      18 Sep 2019 12:21    TPro  6.8  /  Alb  3.6  /  TBili  0.3  /  DBili  x   /  AST  25  /  ALT  38  /  AlkPhos  84  09-18                         11.2   10.48 )-----------( 275      ( 18 Sep 2019 12:21 )             37.0     PT/INR - ( 18 Sep 2019 12:21 )   PT: 10.4 sec;   INR: 0.91 ratio         PTT - ( 18 Sep 2019 12:21 )  PTT:29.9 sec  09-18 @ 12:21 BNP: 113 pg/mL    09-18 @ 12:21 CPK total:--, CKMB --, Troponin I - <.015 ng/mL          RADIOLOGY:  -----------------        ECG:     ECHO

## 2019-09-19 DIAGNOSIS — J96.02 ACUTE RESPIRATORY FAILURE WITH HYPERCAPNIA: ICD-10-CM

## 2019-09-19 DIAGNOSIS — E78.5 HYPERLIPIDEMIA, UNSPECIFIED: ICD-10-CM

## 2019-09-19 LAB
ALBUMIN SERPL ELPH-MCNC: 3.3 G/DL — SIGNIFICANT CHANGE UP (ref 3.3–5)
ALP SERPL-CCNC: 80 U/L — SIGNIFICANT CHANGE UP (ref 40–120)
ALT FLD-CCNC: 30 U/L — SIGNIFICANT CHANGE UP (ref 12–78)
ANION GAP SERPL CALC-SCNC: 9 MMOL/L — SIGNIFICANT CHANGE UP (ref 5–17)
AST SERPL-CCNC: 14 U/L — LOW (ref 15–37)
BASE EXCESS BLDA CALC-SCNC: 1.8 MMOL/L — SIGNIFICANT CHANGE UP (ref -2–2)
BILIRUB SERPL-MCNC: 0.4 MG/DL — SIGNIFICANT CHANGE UP (ref 0.2–1.2)
BLOOD GAS COMMENTS ARTERIAL: SIGNIFICANT CHANGE UP
BLOOD GAS COMMENTS ARTERIAL: SIGNIFICANT CHANGE UP
BUN SERPL-MCNC: 16 MG/DL — SIGNIFICANT CHANGE UP (ref 7–23)
CALCIUM SERPL-MCNC: 9.6 MG/DL — SIGNIFICANT CHANGE UP (ref 8.5–10.1)
CHLORIDE SERPL-SCNC: 103 MMOL/L — SIGNIFICANT CHANGE UP (ref 96–108)
CO2 SERPL-SCNC: 30 MMOL/L — SIGNIFICANT CHANGE UP (ref 22–31)
CREAT SERPL-MCNC: 1.2 MG/DL — SIGNIFICANT CHANGE UP (ref 0.5–1.3)
GLUCOSE SERPL-MCNC: 245 MG/DL — HIGH (ref 70–99)
HCO3 BLDA-SCNC: 25 MMOL/L — SIGNIFICANT CHANGE UP (ref 23–27)
HCT VFR BLD CALC: 35.4 % — LOW (ref 39–50)
HGB BLD-MCNC: 10.5 G/DL — LOW (ref 13–17)
HOROWITZ INDEX BLDA+IHG-RTO: 28 — SIGNIFICANT CHANGE UP
INR BLD: 1.03 RATIO — SIGNIFICANT CHANGE UP (ref 0.88–1.16)
LEGIONELLA AG UR QL: NEGATIVE — SIGNIFICANT CHANGE UP
MCHC RBC-ENTMCNC: 27.3 PG — SIGNIFICANT CHANGE UP (ref 27–34)
MCHC RBC-ENTMCNC: 29.7 GM/DL — LOW (ref 32–36)
MCV RBC AUTO: 92.2 FL — SIGNIFICANT CHANGE UP (ref 80–100)
NRBC # BLD: 0 /100 WBCS — SIGNIFICANT CHANGE UP (ref 0–0)
PCO2 BLDA: 53 MMHG — HIGH (ref 32–46)
PH BLDA: 7.33 — LOW (ref 7.35–7.45)
PHOSPHATE SERPL-MCNC: 3.2 MG/DL — SIGNIFICANT CHANGE UP (ref 2.5–4.5)
PLATELET # BLD AUTO: 274 K/UL — SIGNIFICANT CHANGE UP (ref 150–400)
PO2 BLDA: 110 MMHG — HIGH (ref 74–108)
POTASSIUM SERPL-MCNC: 5.3 MMOL/L — SIGNIFICANT CHANGE UP (ref 3.5–5.3)
POTASSIUM SERPL-SCNC: 5.3 MMOL/L — SIGNIFICANT CHANGE UP (ref 3.5–5.3)
PROCALCITONIN SERPL-MCNC: <0.05 NG/ML — SIGNIFICANT CHANGE UP (ref 0–0.04)
PROT SERPL-MCNC: 6.6 G/DL — SIGNIFICANT CHANGE UP (ref 6–8.3)
PROTHROM AB SERPL-ACNC: 11.6 SEC — SIGNIFICANT CHANGE UP (ref 10–12.9)
RAPID RVP RESULT: SIGNIFICANT CHANGE UP
RBC # BLD: 3.84 M/UL — LOW (ref 4.2–5.8)
RBC # FLD: 13.2 % — SIGNIFICANT CHANGE UP (ref 10.3–14.5)
SAO2 % BLDA: 98 % — HIGH (ref 92–96)
SODIUM SERPL-SCNC: 142 MMOL/L — SIGNIFICANT CHANGE UP (ref 135–145)
TROPONIN I SERPL-MCNC: <.015 NG/ML — SIGNIFICANT CHANGE UP (ref 0.01–0.04)
TROPONIN I SERPL-MCNC: <.015 NG/ML — SIGNIFICANT CHANGE UP (ref 0.01–0.04)
WBC # BLD: 7.99 K/UL — SIGNIFICANT CHANGE UP (ref 3.8–10.5)
WBC # FLD AUTO: 7.99 K/UL — SIGNIFICANT CHANGE UP (ref 3.8–10.5)

## 2019-09-19 RX ADMIN — Medication 4: at 12:12

## 2019-09-19 RX ADMIN — AZITHROMYCIN 255 MILLIGRAM(S): 500 TABLET, FILM COATED ORAL at 15:06

## 2019-09-19 RX ADMIN — Medication 0.5 MILLIGRAM(S): at 07:49

## 2019-09-19 RX ADMIN — Medication 2: at 23:10

## 2019-09-19 RX ADMIN — Medication 40 MILLIGRAM(S): at 22:58

## 2019-09-19 RX ADMIN — ATORVASTATIN CALCIUM 40 MILLIGRAM(S): 80 TABLET, FILM COATED ORAL at 22:58

## 2019-09-19 RX ADMIN — Medication 3 MILLILITER(S): at 14:13

## 2019-09-19 RX ADMIN — TAMSULOSIN HYDROCHLORIDE 0.4 MILLIGRAM(S): 0.4 CAPSULE ORAL at 22:58

## 2019-09-19 RX ADMIN — Medication 12.5 MILLIGRAM(S): at 05:44

## 2019-09-19 RX ADMIN — LOSARTAN POTASSIUM 50 MILLIGRAM(S): 100 TABLET, FILM COATED ORAL at 05:44

## 2019-09-19 RX ADMIN — Medication 3 MILLILITER(S): at 19:41

## 2019-09-19 RX ADMIN — Medication 2: at 08:27

## 2019-09-19 RX ADMIN — Medication 3 MILLILITER(S): at 07:49

## 2019-09-19 RX ADMIN — Medication 0.5 MILLIGRAM(S): at 19:42

## 2019-09-19 RX ADMIN — Medication 3 MILLILITER(S): at 01:24

## 2019-09-19 RX ADMIN — CLOPIDOGREL BISULFATE 75 MILLIGRAM(S): 75 TABLET, FILM COATED ORAL at 11:55

## 2019-09-19 RX ADMIN — PANTOPRAZOLE SODIUM 40 MILLIGRAM(S): 20 TABLET, DELAYED RELEASE ORAL at 05:44

## 2019-09-19 RX ADMIN — Medication 81 MILLIGRAM(S): at 11:55

## 2019-09-19 RX ADMIN — Medication 600 MILLIGRAM(S): at 17:23

## 2019-09-19 RX ADMIN — ENOXAPARIN SODIUM 40 MILLIGRAM(S): 100 INJECTION SUBCUTANEOUS at 11:55

## 2019-09-19 RX ADMIN — Medication 40 MILLIGRAM(S): at 15:06

## 2019-09-19 RX ADMIN — Medication 4: at 17:22

## 2019-09-19 RX ADMIN — Medication 40 MILLIGRAM(S): at 05:44

## 2019-09-19 RX ADMIN — Medication 600 MILLIGRAM(S): at 05:44

## 2019-09-19 NOTE — PROGRESS NOTE ADULT - SUBJECTIVE AND OBJECTIVE BOX
PROGRESS NOTE  Patient is a 79y old  Male who presents with a chief complaint of SOB (19 Sep 2019 10:21)  Chart and available morning labs /imaging are reviewed electronically , urgent issues addressed . More information  is being added upon completion of rounds , when more information is collected and management discussed with consultants , medical staff and social service/case management on the floor   LATE ENTRY- patient was seen and examined earlier today 10 am  . Plan of care was discussed with med staff and unit coordinator .   OVERNIGHT  No new issues reported by medical staff . All above noted Patient is resting in a bed comfortably . .No distress noted   Feels much better ,hoping to go home in am   HPI:  78 yo AAM presents with SOB  since this this morning.  Pt. has a history of COPD with frequent  copd exacerbation.  No fever or inciting event.  Notes that he had stents placed 2 weeks ago at [facility] .  Diagnosed with COPD EXACERBATION AND ACUTE  RESPIRATORY FAILURE ,placed on BIPAP , Admit for intravenous steroids nebulized bronchodilators and pulmonology evaluation,O2 supply, serial labs and chest xrays ,obtain ECHO to evaluate LVEF and rule out chf component ROS: negative for fever, headache, chest pain, abd pain, nausea, vomiting, diarrhea, rash, paresthesia, and weakness--all other systems reviewed are negative Admitted  to telemetry unit for monitoring , send 3 sets of cardiac ensymes to rule out coronary event, obtain ECHO to evaluate LVEF, cardiology consult ,continue current management, O2 supply, anticoagulation plan as per cardiology consult PMH: HTN, COPD (On home O2 2L and BIPAP at while sleeping regardless of time of day), CAD (w/ 3v CABG 2004), HLD, DM2 (on Metformin), BPH, 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 (18 Sep 2019 15:32)    PAST MEDICAL & SURGICAL HISTORY:  PVD (peripheral vascular disease)  Hypertension  COPD (chronic obstructive pulmonary disease): on 2L at home and BiPAP at night; intubated 6/18  Osteomyelitis  Dyslipidemia  CAD (Coronary Artery Disease): s/p 3v CABG 2004  Diabetes Mellitus, Type II  S/P primary angioplasty with coronary stent  Compound fracture: left leg  CABG (Coronary Artery Bypass Graft): 2004      MEDICATIONS  (STANDING):  ALBUTerol/ipratropium for Nebulization 3 milliLiter(s) Nebulizer every 6 hours  aspirin enteric coated 81 milliGRAM(s) Oral daily  atorvastatin 40 milliGRAM(s) Oral at bedtime  azithromycin  IVPB 500 milliGRAM(s) IV Intermittent every 24 hours  azithromycin  IVPB      buDESOnide    Inhalation Suspension 0.5 milliGRAM(s) Inhalation two times a day  clopidogrel Tablet 75 milliGRAM(s) Oral daily  dextrose 5%. 1000 milliLiter(s) (50 mL/Hr) IV Continuous <Continuous>  dextrose 50% Injectable 12.5 Gram(s) IV Push once  dextrose 50% Injectable 25 Gram(s) IV Push once  dextrose 50% Injectable 25 Gram(s) IV Push once  enoxaparin Injectable 40 milliGRAM(s) SubCutaneous daily  guaiFENesin  milliGRAM(s) Oral every 12 hours  influenza   Vaccine 0.5 milliLiter(s) IntraMuscular once  insulin lispro (HumaLOG) corrective regimen sliding scale   SubCutaneous three times a day before meals  insulin lispro (HumaLOG) corrective regimen sliding scale   SubCutaneous at bedtime  losartan 50 milliGRAM(s) Oral daily  methylPREDNISolone sodium succinate Injectable 40 milliGRAM(s) IV Push every 8 hours  metoprolol tartrate 12.5 milliGRAM(s) Oral every 12 hours  pantoprazole    Tablet 40 milliGRAM(s) Oral before breakfast  tamsulosin 0.4 milliGRAM(s) Oral at bedtime    MEDICATIONS  (PRN):  acetaminophen   Tablet .. 650 milliGRAM(s) Oral every 6 hours PRN Temp greater or equal to 38C (100.4F), Mild Pain (1 - 3)  dextrose 40% Gel 15 Gram(s) Oral once PRN Blood Glucose LESS THAN 70 milliGRAM(s)/deciliter  glucagon  Injectable 1 milliGRAM(s) IntraMuscular once PRN Glucose LESS THAN 70 milligrams/deciliter      OBJECTIVE    T(C): 36.7 (09-19-19 @ 11:53), Max: 37 (09-18-19 @ 22:41)  HR: 77 (09-19-19 @ 14:37) (69 - 94)  BP: 130/75 (09-19-19 @ 11:53) (117/68 - 143/73)  RR: 18 (09-19-19 @ 11:53) (18 - 40)  SpO2: 96% (09-19-19 @ 14:37) (96% - 100%)  Wt(kg): --  I&O's Summary    18 Sep 2019 07:01  -  19 Sep 2019 07:00  --------------------------------------------------------  IN: 0 mL / OUT: 400 mL / NET: -400 mL    19 Sep 2019 07:01  -  19 Sep 2019 14:47  --------------------------------------------------------  IN: 0 mL / OUT: 1500 mL / NET: -1500 mL          REVIEW OF SYSTEMS:  CONSTITUTIONAL: No fever, weight loss, or fatigue  EYES: No eye pain, visual disturbances, or discharge  ENMT:   No sinus or throat pain  NECK: No pain or stiffness  RESPIRATORY: No cough, wheezing, chills or hemoptysis; No shortness of breath  CARDIOVASCULAR: No chest pain, palpitations, dizziness, or leg swelling  GASTROINTESTINAL: No abdominal pain. No nausea, vomiting; No diarrhea or constipation. No melena or hematochezia.  GENITOURINARY: No dysuria, frequency, hematuria, or incontinence  NEUROLOGICAL: No headaches, memory loss, loss of strength, numbness, or tremors  SKIN: No itching, burning, rashes, or lesions   MUSCULOSKELETAL: No joint pain or swelling; No muscle, back, or extremity pain    PHYSICAL EXAM:  Appearance: NAD. VS past 24 hrs -as above   HEENT:   Moist oral mucosa. Conjunctiva clear b/l.   Neck : supple  Respiratory: Lungs CTAB.  Gastrointestinal:  Soft, nontender. No rebound. No rigidity. BS present	  Cardiovascular: RRR ,S1S2 present  Neurologic: Non-focal. Moving all extremities.  Extremities: No edema. No erythema. No calf tenderness.  Skin: No rashes, No ecchymoses, No cyanosis.	  wounds ,skin lesions-See skin assesment flow sheet   LABS:                        10.5   7.99  )-----------( 274      ( 19 Sep 2019 07:01 )             35.4     09-19    142  |  103  |  16  ----------------------------<  245<H>  5.3   |  30  |  1.20    Ca    9.6      19 Sep 2019 07:01  Phos  3.2     09-19    TPro  6.6  /  Alb  3.3  /  TBili  0.4  /  DBili  x   /  AST  14<L>  /  ALT  30  /  AlkPhos  80  09-19    CAPILLARY BLOOD GLUCOSE      POCT Blood Glucose.: 327 mg/dL (19 Sep 2019 11:56)  POCT Blood Glucose.: 216 mg/dL (19 Sep 2019 08:00)  POCT Blood Glucose.: 347 mg/dL (18 Sep 2019 22:37)  POCT Blood Glucose.: 282 mg/dL (18 Sep 2019 17:50)    PT/INR - ( 19 Sep 2019 07:01 )   PT: 11.6 sec;   INR: 1.03 ratio         PTT - ( 18 Sep 2019 12:21 )  PTT:29.9 sec      RADIOLOGY & ADDITIONAL TESTS:< from: Xray Chest 1 View- PORTABLE-Urgent (09.18.19 @ 12:20) >  EXAM:  XR CHEST PORTABLE URGENT 1V                            PROCEDURE DATE:  09/18/2019          INTERPRETATION:  AP semierect chest on September 18, 2019 at 12:01 PM.   Patient is short of breath and has history of COPD. 2 images obtained.    Heart size is within normal limits. Sternotomy again noted.    Increased lung volume again noted.    There is a persistent slight scar at left base.    Chest is similar to August 23 of this year.    IMPRESSION: Unchanged chest showing COPD, sternotomy, and left base scar.    < end of copied text >  < from: US Duplex Venous Lower Ext Complete, Bilateral (08.12.19 @ 08:58) >  EXAM:  US DPLX LWR EXT VEINS COMPL BI                            PROCEDURE DATE:  08/12/2019          INTERPRETATION:  CLINICAL INFORMATION: Short of breath    COMPARISON: None available.    TECHNIQUE: Duplex sonography of the BILATERAL LOWER extremity veins with   color and spectral Doppler, with and without compression.      FINDINGS:    There is normal compressibility of the bilateral common femoral, femoral   and popliteal veins.     Doppler examination shows normal spontaneous and phasic flow.    No calf vein thrombosis is detected.    IMPRESSION:     No evidence of deep venous thrombosis in either lower extremity.    < end of copied text >     reviewed elctronically  45minutes spent on this visit, 50% visit time spent in care co-ordination with other attendings and counselling patient  I have discussed care plan with patient and HCP,expressed understanding of problems treatment and their effect and side effects, alternatives in detail,I have asked if they have any questions and concerns and appropriately addressed them to best of my ability

## 2019-09-19 NOTE — PHARMACOTHERAPY INTERVENTION NOTE - COMMENTS
Patient on azithromycin for bronchitis, discussed utility with Dr Dejesus, would like to continue, ok to enter stop date 5 days

## 2019-09-19 NOTE — PROGRESS NOTE ADULT - SUBJECTIVE AND OBJECTIVE BOX
Patient is a 79y Male with a known history of :  Prophylactic measure (Z29.9)  Diabetes Mellitus, Type II (E11.9)  Hypertension (I10)  PVD (peripheral vascular disease) (I73.9)  CAD (Coronary Artery Disease) (I25.10)  Respiratory failure (J96.90)  COPD exacerbation (J44.1)    HPI:  80 yo AAM presents with SOB  since this this morning.  Pt. has a history of COPD with frequent  copd exacerbation.  No fever or inciting event.  Notes that he had stents placed 2 weeks ago at [facility] .  Diagnosed with COPD EXACERBATION AND ACUTE  RESPIRATORY FAILURE ,placed on BIPAP , Admit for intravenous steroids nebulized bronchodilators and pulmonology evaluation,O2 supply, serial labs and chest xrays ,obtain ECHO to evaluate LVEF and rule out chf component ROS: negative for fever, headache, chest pain, abd pain, nausea, vomiting, diarrhea, rash, paresthesia, and weakness--all other systems reviewed are negative Admitted  to telemetry unit for monitoring , send 3 sets of cardiac ensymes to rule out coronary event, obtain ECHO to evaluate LVEF, cardiology consult ,continue current management, O2 supply, anticoagulation plan as per cardiology consult PMH: HTN, COPD (On home O2 2L and BIPAP at while sleeping regardless of time of day), CAD (w/ 3v CABG 2004), HLD, DM2 (on Metformin), BPH, 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 (18 Sep 2019 15:32)      REVIEW OF SYSTEMS:    CONSTITUTIONAL: No fever, weight loss, or fatigue  EYES: No eye pain, visual disturbances, or discharge  ENMT:  No difficulty hearing, tinnitus, vertigo; No sinus or throat pain  NECK: No pain or stiffness  BREASTS: No pain, masses, or nipple discharge  RESPIRATORY: No cough, wheezing, chills or hemoptysis; No shortness of breath  CARDIOVASCULAR: No chest pain, palpitations, dizziness, or leg swelling  GASTROINTESTINAL: No abdominal or epigastric pain. No nausea, vomiting, or hematemesis; No diarrhea or constipation. No melena or hematochezia.  GENITOURINARY: No dysuria, frequency, hematuria, or incontinence  NEUROLOGICAL: No headaches, memory loss, loss of strength, numbness, or tremors  SKIN: No itching, burning, rashes, or lesions   LYMPH NODES: No enlarged glands  ENDOCRINE: No heat or cold intolerance; No hair loss  MUSCULOSKELETAL: No joint pain or swelling; No muscle, back, or extremity pain  PSYCHIATRIC: No depression, anxiety, mood swings, or difficulty sleeping  HEME/LYMPH: No easy bruising, or bleeding gums  ALLERGY AND IMMUNOLOGIC: No hives or eczema    MEDICATIONS  (STANDING):  ALBUTerol/ipratropium for Nebulization 3 milliLiter(s) Nebulizer every 6 hours  aspirin enteric coated 81 milliGRAM(s) Oral daily  atorvastatin 40 milliGRAM(s) Oral at bedtime  azithromycin  IVPB 500 milliGRAM(s) IV Intermittent every 24 hours  azithromycin  IVPB      buDESOnide    Inhalation Suspension 0.5 milliGRAM(s) Inhalation two times a day  clopidogrel Tablet 75 milliGRAM(s) Oral daily  dextrose 5%. 1000 milliLiter(s) (50 mL/Hr) IV Continuous <Continuous>  dextrose 50% Injectable 12.5 Gram(s) IV Push once  dextrose 50% Injectable 25 Gram(s) IV Push once  dextrose 50% Injectable 25 Gram(s) IV Push once  enoxaparin Injectable 40 milliGRAM(s) SubCutaneous daily  guaiFENesin  milliGRAM(s) Oral every 12 hours  influenza   Vaccine 0.5 milliLiter(s) IntraMuscular once  insulin lispro (HumaLOG) corrective regimen sliding scale   SubCutaneous three times a day before meals  insulin lispro (HumaLOG) corrective regimen sliding scale   SubCutaneous at bedtime  losartan 50 milliGRAM(s) Oral daily  methylPREDNISolone sodium succinate Injectable 40 milliGRAM(s) IV Push every 8 hours  metoprolol tartrate 12.5 milliGRAM(s) Oral every 12 hours  pantoprazole    Tablet 40 milliGRAM(s) Oral before breakfast  tamsulosin 0.4 milliGRAM(s) Oral at bedtime    MEDICATIONS  (PRN):  acetaminophen   Tablet .. 650 milliGRAM(s) Oral every 6 hours PRN Temp greater or equal to 38C (100.4F), Mild Pain (1 - 3)  dextrose 40% Gel 15 Gram(s) Oral once PRN Blood Glucose LESS THAN 70 milliGRAM(s)/deciliter  glucagon  Injectable 1 milliGRAM(s) IntraMuscular once PRN Glucose LESS THAN 70 milligrams/deciliter      ALLERGIES: No Known Allergies      FAMILY HISTORY:  Family history of diabetes mellitus (Sibling)      PHYSICAL EXAMINATION:  -----------------------------  T(C): 36.4 (09-19-19 @ 08:35), Max: 37.2 (09-18-19 @ 11:23)  HR: 69 (09-19-19 @ 08:35) (69 - 108)  BP: 128/78 (09-19-19 @ 08:35) (117/68 - 153/91)  RR: 18 (09-19-19 @ 08:35) (18 - 40)  SpO2: 100% (09-19-19 @ 08:35) (97% - 100%)  Wt(kg): --    09-18 @ 07:01  -  09-19 @ 07:00  --------------------------------------------------------  IN:  Total IN: 0 mL    OUT:    Voided: 400 mL  Total OUT: 400 mL    Total NET: -400 mL      09-19 @ 07:01  -  09-19 @ 08:49  --------------------------------------------------------  IN:  Total IN: 0 mL    OUT:    Voided: 400 mL  Total OUT: 400 mL    Total NET: -400 mL        Height (cm): 177.8 (09-18 @ 15:33)  Weight (kg): 90.7 (09-18 @ 15:33)  BMI (kg/m2): 28.7 (09-18 @ 15:33)  BSA (m2): 2.09 (09-18 @ 15:33)    Constitutional: well developed, normal appearance, well groomed, well nourished, no deformities and no acute distress.   Eyes: the conjunctiva exhibited no abnormalities and the eyelids demonstrated no xanthelasmas.   HEENT: normal oral mucosa, no oral pallor and no oral cyanosis.   Neck: normal jugular venous A waves present, normal jugular venous V waves present and no jugular venous cummins A waves.   Pulmonary: no respiratory distress, normal respiratory rhythm and effort, no accessory muscle use and lungs were clear to auscultation bilaterally.   Cardiovascular: heart rate and rhythm were normal, normal S1 and S2 and no murmur, gallop, rub, heave or thrill are present.   Abdomen: soft, non-tender, no hepato-splenomegaly and no abdominal mass palpated.   Musculoskeletal: the gait could not be assessed..   Extremities: no clubbing of the fingernails, no localized cyanosis, no petechial hemorrhages and no ischemic changes.   Skin: normal skin color and pigmentation, no rash, no venous stasis, no skin lesions, no skin ulcer and no xanthoma was observed.   Psychiatric: oriented to person, place, and time, the affect was normal, the mood was normal and not feeling anxious.     LABS:   --------  09-19    142  |  103  |  16  ----------------------------<  245<H>  5.3   |  30  |  1.20    Ca    9.6      19 Sep 2019 07:01  Phos  3.2     09-19    TPro  6.6  /  Alb  3.3  /  TBili  0.4  /  DBili  x   /  AST  14<L>  /  ALT  30  /  AlkPhos  80  09-19                         10.5   7.99  )-----------( 274      ( 19 Sep 2019 07:01 )             35.4     PT/INR - ( 19 Sep 2019 07:01 )   PT: 11.6 sec;   INR: 1.03 ratio         PTT - ( 18 Sep 2019 12:21 )  PTT:29.9 sec  09-18 @ 12:21 BNP: 113 pg/mL    09-19 @ 07:01 CPK total:--, CKMB --, Troponin I - <.015 ng/mL  09-18 @ 19:12 CPK total:--, CKMB --, Troponin I - <.015 ng/mL  09-18 @ 12:21 CPK total:--, CKMB --, Troponin I - <.015 ng/mL          RADIOLOGY:  -----------------        ECG:

## 2019-09-19 NOTE — PROGRESS NOTE ADULT - SUBJECTIVE AND OBJECTIVE BOX
Pt examined Note to follow COMMBRIANNE TURNER [facility] [phone]  1940 DOA  2019  ALLERGY Shellfish  [facility]       Initial evaluation/Pulmonary Critical Care consultation requested on 2019   by Dr Mar   from Dr Dejesus   Patient examined chart reviewed    HOSPITAL ADMISSION   PATIENT CAME  FROM (if information available)        TYPE OF VISIT      Subsequent Pulmonary followup     REASON FOR VISIT  PLEASE SEE PROBLEM LIST/ASSESSMENT AND RECOMMENDATIONS     PATIENT COMMODORE YUE [facility] [phone]  1940 DOA  2019    VITALS/LABS    2019 afeb 77 100/60 19 100%   2019 W 7.9 Hb 10.5 Plt 274 INR 1 Na 142 K 5.3 CO2 30 Cr 1.2   2019 pc n      2019 .28 733/53/110     REVIEW OF SYMPTOMS     NOTE Noteworthy changes  if any  in ROS and PE are also entered  in note  below      Able to give ROS  Yes     RELIABLE No   CONSTITUTIONAL Weakness Yes  Chills No Vision changes No  ENDOCRINE No unexplained hair loss No heat or cold intolerance    ALLERGY No hives  Sore throat No   RESP Coughing blood no  Shortness of breath YES   NEURO No Headache  Confusion Pain neck No   CARDIAC No Chest pain No Palpitations   GI No Pain abdomen NO   Vomiting NO     PHYSICAL EXAM    HEENT Unremarkable PERRLA atraumatic   RESP Fair air entry EXP prolonged    Harsh breath sound Resp distres mild   CARDIAC S1 S2 No S3     NO JVD    ABDOMEN SOFT BS PRESENT NOT DISTENDED No hepatosplenomegaly PEDAL EDEMA present No calf tenderness  NO rash   GENERAL Not TOXIC looking    PATIENT COMMODORE YUE [facility] [phone]  1940 DOA  2019                           Pt description                          cc SOB HO 2 stents placed at [facility] few weeks ago   er vitals 153/91 108 20    HPI PMH   79 m former smoker PMH HTN, DM2 (on home Metformin and glipizide), COPD (2L home/ BIPAP at night), CAD with 3v CABG , Stent 2019 HLD, 10/26/2018 ECHO ef 55% hx hypercapnic resp failure with ho intubation c/b with cardiac arrest (2018) with ho recurrent admissions GOLD D admitted     2019 with copd ex ac on chr hypercapnic resp failure  Pulm consulted                            PATIENT COMMODORE YUE [facility] [phone]  1940 DOA  2019                            PATIENT DATA     ALLERGY               nka    possibly shellfish                                                                                                       HEAD OF BED ELEVATION Yes   DVT PROPHYLAXIS              lvnx 40 ()                    DIET                                   cons carb ()

## 2019-09-19 NOTE — GOALS OF CARE CONVERSATION - PERSONAL ADVANCE DIRECTIVE - CONVERSATION DETAILS
met pt on bipap, pt has hcp, awaiting  for copy, pt wife is hcp (Sania), pt has discussed his resuscitation wishes; pt wants trial vent, does not want long term vent or trach. pt was intubated a few months ago, would want the same at this time.  hope with medications able to come off bipap shortly.  PC RN contact # given

## 2019-09-20 ENCOUNTER — TRANSCRIPTION ENCOUNTER (OUTPATIENT)
Age: 79
End: 2019-09-20

## 2019-09-20 VITALS — OXYGEN SATURATION: 98 %

## 2019-09-20 LAB
ANION GAP SERPL CALC-SCNC: 11 MMOL/L — SIGNIFICANT CHANGE UP (ref 5–17)
BUN SERPL-MCNC: 26 MG/DL — HIGH (ref 7–23)
CALCIUM SERPL-MCNC: 9.4 MG/DL — SIGNIFICANT CHANGE UP (ref 8.5–10.1)
CHLORIDE SERPL-SCNC: 100 MMOL/L — SIGNIFICANT CHANGE UP (ref 96–108)
CO2 SERPL-SCNC: 29 MMOL/L — SIGNIFICANT CHANGE UP (ref 22–31)
CREAT SERPL-MCNC: 1.3 MG/DL — SIGNIFICANT CHANGE UP (ref 0.5–1.3)
GLUCOSE SERPL-MCNC: 305 MG/DL — HIGH (ref 70–99)
HCT VFR BLD CALC: 33.7 % — LOW (ref 39–50)
HGB BLD-MCNC: 10.3 G/DL — LOW (ref 13–17)
MCHC RBC-ENTMCNC: 27.6 PG — SIGNIFICANT CHANGE UP (ref 27–34)
MCHC RBC-ENTMCNC: 30.6 GM/DL — LOW (ref 32–36)
MCV RBC AUTO: 90.3 FL — SIGNIFICANT CHANGE UP (ref 80–100)
NRBC # BLD: 0 /100 WBCS — SIGNIFICANT CHANGE UP (ref 0–0)
PLATELET # BLD AUTO: 266 K/UL — SIGNIFICANT CHANGE UP (ref 150–400)
POTASSIUM SERPL-MCNC: 4.7 MMOL/L — SIGNIFICANT CHANGE UP (ref 3.5–5.3)
POTASSIUM SERPL-SCNC: 4.7 MMOL/L — SIGNIFICANT CHANGE UP (ref 3.5–5.3)
RBC # BLD: 3.73 M/UL — LOW (ref 4.2–5.8)
RBC # FLD: 13.1 % — SIGNIFICANT CHANGE UP (ref 10.3–14.5)
SODIUM SERPL-SCNC: 140 MMOL/L — SIGNIFICANT CHANGE UP (ref 135–145)
WBC # BLD: 11.92 K/UL — HIGH (ref 3.8–10.5)
WBC # FLD AUTO: 11.92 K/UL — HIGH (ref 3.8–10.5)

## 2019-09-20 RX ORDER — INSULIN GLARGINE 100 [IU]/ML
10 INJECTION, SOLUTION SUBCUTANEOUS EVERY MORNING
Refills: 0 | Status: DISCONTINUED | OUTPATIENT
Start: 2019-09-21 | End: 2019-09-20

## 2019-09-20 RX ORDER — INSULIN GLARGINE 100 [IU]/ML
10 INJECTION, SOLUTION SUBCUTANEOUS ONCE
Refills: 0 | Status: COMPLETED | OUTPATIENT
Start: 2019-09-20 | End: 2019-09-20

## 2019-09-20 RX ORDER — ACETAMINOPHEN 500 MG
2 TABLET ORAL
Qty: 0 | Refills: 0 | DISCHARGE
Start: 2019-09-20

## 2019-09-20 RX ORDER — ALBUTEROL 90 UG/1
2 AEROSOL, METERED ORAL
Qty: 0 | Refills: 0 | DISCHARGE

## 2019-09-20 RX ORDER — ASPIRIN/CALCIUM CARB/MAGNESIUM 324 MG
1 TABLET ORAL
Qty: 0 | Refills: 0 | DISCHARGE
Start: 2019-09-20

## 2019-09-20 RX ORDER — AZITHROMYCIN 500 MG/1
500 TABLET, FILM COATED ORAL DAILY
Refills: 0 | Status: DISCONTINUED | OUTPATIENT
Start: 2019-09-20 | End: 2019-09-20

## 2019-09-20 RX ORDER — AZITHROMYCIN 500 MG/1
1 TABLET, FILM COATED ORAL
Qty: 3 | Refills: 0
Start: 2019-09-20 | End: 2019-09-22

## 2019-09-20 RX ORDER — INSULIN LISPRO 100/ML
VIAL (ML) SUBCUTANEOUS AT BEDTIME
Refills: 0 | Status: DISCONTINUED | OUTPATIENT
Start: 2019-09-20 | End: 2019-09-20

## 2019-09-20 RX ORDER — PANTOPRAZOLE SODIUM 20 MG/1
1 TABLET, DELAYED RELEASE ORAL
Qty: 30 | Refills: 0
Start: 2019-09-20 | End: 2019-10-19

## 2019-09-20 RX ORDER — INSULIN LISPRO 100/ML
VIAL (ML) SUBCUTANEOUS
Refills: 0 | Status: DISCONTINUED | OUTPATIENT
Start: 2019-09-20 | End: 2019-09-20

## 2019-09-20 RX ORDER — METOPROLOL TARTRATE 50 MG
25 TABLET ORAL
Qty: 0 | Refills: 0 | DISCHARGE

## 2019-09-20 RX ADMIN — Medication 0.5 MILLIGRAM(S): at 07:36

## 2019-09-20 RX ADMIN — Medication 3 MILLILITER(S): at 01:41

## 2019-09-20 RX ADMIN — Medication 3 MILLILITER(S): at 07:35

## 2019-09-20 RX ADMIN — Medication 8: at 12:49

## 2019-09-20 RX ADMIN — AZITHROMYCIN 500 MILLIGRAM(S): 500 TABLET, FILM COATED ORAL at 18:05

## 2019-09-20 RX ADMIN — ENOXAPARIN SODIUM 40 MILLIGRAM(S): 100 INJECTION SUBCUTANEOUS at 12:49

## 2019-09-20 RX ADMIN — INSULIN GLARGINE 10 UNIT(S): 100 INJECTION, SOLUTION SUBCUTANEOUS at 09:33

## 2019-09-20 RX ADMIN — CLOPIDOGREL BISULFATE 75 MILLIGRAM(S): 75 TABLET, FILM COATED ORAL at 12:50

## 2019-09-20 RX ADMIN — Medication 12.5 MILLIGRAM(S): at 05:45

## 2019-09-20 RX ADMIN — LOSARTAN POTASSIUM 50 MILLIGRAM(S): 100 TABLET, FILM COATED ORAL at 05:45

## 2019-09-20 RX ADMIN — PANTOPRAZOLE SODIUM 40 MILLIGRAM(S): 20 TABLET, DELAYED RELEASE ORAL at 06:20

## 2019-09-20 RX ADMIN — Medication 3 MILLILITER(S): at 13:09

## 2019-09-20 RX ADMIN — Medication 81 MILLIGRAM(S): at 12:50

## 2019-09-20 RX ADMIN — Medication 600 MILLIGRAM(S): at 05:45

## 2019-09-20 RX ADMIN — Medication 40 MILLIGRAM(S): at 05:44

## 2019-09-20 NOTE — DISCHARGE NOTE PROVIDER - NSDCCPCAREPLAN_GEN_ALL_CORE_FT
PRINCIPAL DISCHARGE DIAGNOSIS  Diagnosis: Acute hypercapnic respiratory failure  Assessment and Plan of Treatment: Acute hypercapnic respiratory failure      SECONDARY DISCHARGE DIAGNOSES  Diagnosis: COPD exacerbation  Assessment and Plan of Treatment: COPD exacerbation    Diagnosis: CAD (Coronary Artery Disease)  Assessment and Plan of Treatment: CAD (Coronary Artery Disease)    Diagnosis: Hypertension  Assessment and Plan of Treatment: Hypertension    Diagnosis: PVD (peripheral vascular disease)  Assessment and Plan of Treatment: PVD (peripheral vascular disease)    Diagnosis: Dyslipidemia  Assessment and Plan of Treatment: Dyslipidemia    Diagnosis: PVD (peripheral vascular disease)  Assessment and Plan of Treatment: PVD (peripheral vascular disease)    Diagnosis: Diabetes Mellitus, Type II  Assessment and Plan of Treatment: Diabetes Mellitus, Type II

## 2019-09-20 NOTE — PROGRESS NOTE ADULT - ASSESSMENT
PATIENT COMMODORE TURNER [facility] P [phone]  1940 DOA  2019                           PULMONARY/CRITICAL CARE ASSESSMENT/RECOMMENDATIONS LIST                                         ACUTE ON CHRONIC HYPERCAPNIC RESP FAILURE    ra rest 87%  2019 .28 733/53/110   Responded to bpap Pt is reluctant to use bpap Encouraged to use Explained its salutary effect in copd ex   2019 will order ra pulse ox to document need for home oxygen as required by DME sup  2019 As patient is ambulatory I think that he will benefit from portable oxygen concentrator ans he cannot tolerate even briefly running out of oxygen supply     RESP TRACT INFECTION   On azithro for antiinflamm effect and as antibio use in copd ex prevents readmissions     COPD ex  A/R Agree with BD steroids azithro     CAD   MI ruled ut 2019 Tr 1 n.3 Continue  plavix 75 () metoprolol 25 () valsartan 80 () No ongoing ischemia suspect    DISPOSITION  DC planning for dc on  Taper prednisone over 6 d to his baseline dose   He has been on Prednisone 10 eod (2019)     TIME SPENT Over 25 minutes aggregate care time spent on encounter; activities included   direct pt care, counseling and/or coordinating care reviewing notes, lab data/ imaging , discussion with multidisciplinary team/ pt /family. Risks, benefits, alternatives  discussed in detail.
pt with exacerbation of copd   ashd   s/p cabg - 2004  s/p coronary stent 2 weeks ago - on asa and plavix  hypertension  dyslipiodemia  dm2
80 yo AAM presents with SOB  since this this morning.  Pt. has a history of COPD with frequent  copd exacerbation.  No fever or inciting event.  Notes that he had stents placed 2 weeks ago at [facility] .  Diagnosed with COPD EXACERBATION AND ACUTE  RESPIRATORY FAILURE ,placed on BIPAP , Admit for intravenous steroids nebulized bronchodilators and pulmonology evaluation,O2 supply, serial labs and chest xrays ,obtain ECHO to evaluate LVEF and rule out chf component ROS: negative for fever, headache, chest pain, abd pain, nausea, vomiting, diarrhea, rash, paresthesia, and weakness--all other systems reviewed are negative Admitted  to telemetry unit for monitoring , send 3 sets of cardiac ensymes to rule out coronary event, obtain ECHO to evaluate LVEF, cardiology consult ,continue current management, O2 supply, anticoagulation plan as per cardiology consult PMH: HTN, COPD (On home O2 2L and BIPAP at while sleeping regardless of time of day), CAD (w/ 3v CABG 2004), HLD, DM2 (on Metformin), BPH, 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
pt with exacerbation of copd   ashd   s/p cabg - 2004  s/p coronary stent 2 weeks ago - on asa and plavix  hypertension  dyslipiodemia  dm2  cardiac status is stable 9/20
PATIENT COMMODORE TURNER [facility] P [phone]  1940 DOA  2019                           PULMONARY/CRITICAL CARE ASSESSMENT/RECOMMENDATIONS LIST                                         ACUTE ON CHRONIC HYPERCAPNIC RESP FAILURE   2019 .28 733/53/110   Responded to bpap Pt is reluctant to use bpap Encouraged to use Explained its salutary effect in copd ex   2019 will order ra pulse ox to document need for home oxygen as required by DME sup  2019 As patient is ambulatory I think that he will benefit from portable oxygen concentrator ans he cannot tolerate even briefly running out of oxygen supply     RESP TRACT INFECTION   On azithro for antiinflamm effect and as antibio use in copd ex prevents readmissions     COPD ex  A/R Agree with BD steroids azithro     CAD   MI ruled ut 2019 Tr 1 n.3 Continue  plavix 75 () metoprolol 25 () valsartan 80 () No ongoing ischemia suspect    DISPOSITION  DC planning for dc on  Taper prednisone over 6 d to his baseline dose   He has been on Prednisone 10 eod (2019)               TIME SPENT Over 25 minutes aggregate care time spent on encounter; activities included   direct pt care, counseling and/or coordinating care reviewing notes, lab data/ imaging , discussion with multidisciplinary team/ pt /family. Risks, benefits, alternatives  discussed in detail.
80 yo AAM presents with SOB  since this this morning.  Pt. has a history of COPD with frequent  copd exacerbation.  No fever or inciting event.  Notes that he had stents placed 2 weeks ago at [facility] .  Diagnosed with COPD EXACERBATION AND ACUTE  RESPIRATORY FAILURE ,placed on BIPAP , Admit for intravenous steroids nebulized bronchodilators and pulmonology evaluation,O2 supply, serial labs and chest xrays ,obtain ECHO to evaluate LVEF and rule out chf component ROS: negative for fever, headache, chest pain, abd pain, nausea, vomiting, diarrhea, rash, paresthesia, and weakness--all other systems reviewed are negative Admitted  to telemetry unit for monitoring , send 3 sets of cardiac ensymes to rule out coronary event, obtain ECHO to evaluate LVEF, cardiology consult ,continue current management, O2 supply, anticoagulation plan as per cardiology consult PMH: HTN, COPD (On home O2 2L and BIPAP at while sleeping regardless of time of day), CAD (w/ 3v CABG 2004), HLD, DM2 (on Metformin), BPH, 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

## 2019-09-20 NOTE — PHARMACOTHERAPY INTERVENTION NOTE - COMMENTS
Spoke with Dr Dejesus regarding azithromycin. Dr Dejesus agreed to change patient to po and wanted a total of 5 days (patient received 2 days IV)

## 2019-09-20 NOTE — PROGRESS NOTE ADULT - PROBLEM SELECTOR PLAN 1
on steroid   and bronchodilator
improved ,  continue current management and present  medications ,patient has O2 at home ,taper prednisone ,continue home inhalers as per Dr Dejesus
on steroid   and bronchodilator
improved ,  continue current management and present  medications ,patient has O2 at home

## 2019-09-20 NOTE — PROGRESS NOTE ADULT - ATTENDING COMMENTS
80 yo AAM presents with SOB  since this this morning.  Pt. has a history of COPD with frequent  copd exacerbation.  No fever or inciting event.  Notes that he had stents placed 2 weeks ago at [facility] .  Diagnosed with COPD EXACERBATION AND ACUTE  RESPIRATORY FAILURE ,placed on BIPAP , Admit for intravenous steroids nebulized bronchodilators and pulmonology evaluation,O2 supply, serial labs and chest xrays ,obtain ECHO to evaluate LVEF and rule out chf component ROS: negative for fever, headache, chest pain, abd pain, nausea, vomiting, diarrhea, rash, paresthesia, and weakness--all other systems reviewed are negative Admitted  to telemetry unit for monitoring , send 3 sets of cardiac ensymes to rule out coronary event, obtain ECHO to evaluate LVEF, cardiology consult ,continue current management, O2 supply, anticoagulation plan as per cardiology consult PMH: HTN, COPD (On home O2 2L and BIPAP at while sleeping regardless of time of day), CAD (w/ 3v CABG 2004), HLD, DM2 (on Metformin), BPH, 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
78 yo AAM presents with SOB  since this this morning.  Pt. has a history of COPD with frequent  copd exacerbation.  No fever or inciting event.  Notes that he had stents placed 2 weeks ago at [facility] .  Diagnosed with COPD EXACERBATION AND ACUTE  RESPIRATORY FAILURE ,placed on BIPAP , Admit for intravenous steroids nebulized bronchodilators and pulmonology evaluation,O2 supply, serial labs and chest xrays ,obtain ECHO to evaluate LVEF and rule out chf component ROS: negative for fever, headache, chest pain, abd pain, nausea, vomiting, diarrhea, rash, paresthesia, and weakness--all other systems reviewed are negative Admitted  to telemetry unit for monitoring , send 3 sets of cardiac ensymes to rule out coronary event, obtain ECHO to evaluate LVEF, cardiology consult ,continue current management, O2 supply, anticoagulation plan as per cardiology consult PMH: HTN, COPD (On home O2 2L and BIPAP at while sleeping regardless of time of day), CAD (w/ 3v CABG 2004), HLD, DM2 (on Metformin), BPH, 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

## 2019-09-20 NOTE — PHYSICAL THERAPY INITIAL EVALUATION ADULT - PERTINENT HX OF CURRENT PROBLEM, REHAB EVAL
78 yo AAM presents with SOB  since this this morning.  Pt. has a history of COPD with frequent  copd exacerbation.  No fever or inciting event.  Notes that he had stents placed 2 weeks ago at [facility] .  Diagnosed with COPD EXACERBATION AND ACUTE  RESPIRATORY FAILURE ,placed on BIPAP , Admit for intravenous steroids nebulized bronchodilators and pulmonology

## 2019-09-20 NOTE — PROGRESS NOTE ADULT - SUBJECTIVE AND OBJECTIVE BOX
COMMODORE TURNER [facility] P [phone]  1940 DOA  2019  ALLERGY Shellfish  CONTACT Sp [facility] C      Initial evaluation/Pulmonary Critical Care consultation requested on 2019   by Dr Mar   from Dr Dejesus   Patient examined chart reviewed    HOSPITAL ADMISSION   PATIENT CAME  FROM (if information available)        COMMODORE TURNER [facility] P [phone]  1940 DOA  2019  ALLERGY Shellfish  CONTACT Sp [facility]      Initial evaluation/Pulmonary Critical Care consultation requested on 2019   by Dr Mar   from Dr Dejesus   Patient examined chart reviewed    HOSPITAL ADMISSION   PATIENT CAME  FROM (if information available)        PATIENT COMMODORE TURNER [facility] P [phone]  1940 DOA  2019    VITALS/LABS    2019 afeb 61 112/69 16 100%   2019 5a 16/6/.3   2019 w 11.9 Hb 10.3 Plt 266 Na 140 K 4.7 CO2 29 Cr 1.3     REVIEW OF SYMPTOMS     NOTE Noteworthy changes  if any  in ROS and PE are also entered  in note  below      Able to give ROS  Yes     RELIABLE No   CONSTITUTIONAL Weakness Yes  Chills No Vision changes No  ENDOCRINE No unexplained hair loss No heat or cold intolerance    ALLERGY No hives  Sore throat No   RESP Coughing blood no  Shortness of breath YES   NEURO No Headache  Confusion Pain neck No   CARDIAC No Chest pain No Palpitations   GI No Pain abdomen NO   Vomiting NO     PHYSICAL EXAM    HEENT Unremarkable PERRLA atraumatic   RESP Fair air entry EXP prolonged    Harsh breath sound Resp distres mild   CARDIAC S1 S2 No S3     NO JVD    ABDOMEN SOFT BS PRESENT NOT DISTENDED No hepatosplenomegaly PEDAL EDEMA present No calf tenderness  NO rash   GENERAL Not TOXIC looking    PATIENT COMMODORE TURNER [facility] P [phone]  1940 DOA  2019                           Pt description                          cc SOB HO 2 stents placed at [facility] few weeks ago   er vitals 153/91 108 20    HPI PMH   79 m former smoker PMH HTN, DM2 (on home Metformin and glipizide), COPD (2L home/ BIPAP at night), CAD with 3v CABG , Stent 2019 HLD, 10/26/2018 ECHO ef 55% hx hypercapnic resp failure with ho intubation c/b with cardiac arrest (2018) with ho recurrent admissions GOLD D admitted     2019 with copd ex ac on chr hypercapnic resp failure  Pulm consulted                            PATIENT COMMODORE YUE [facility] P [phone]  1940 DOA  2019                            PATIENT DATA     ALLERGY               nka    possibly shellfish                                                                                                       HEAD OF BED ELEVATION Yes   DVT PROPHYLAXIS              lvnx 40 ()                    DIET                                   cons carb ()                              RESPIRATORY GAS EXCHANGE    ra rest 87% within 2 min after removing oxygen

## 2019-09-20 NOTE — DISCHARGE NOTE PROVIDER - PROVIDER TOKENS
PROVIDER:[TOKEN:[[id number]],FOLLOWUP:[1 week]],PROVIDER:[TOKEN:[[id number]],FOLLOWUP:[2 weeks]],PROVIDER:[TOKEN:[[id number]],FOLLOWUP:[1 month]]

## 2019-09-20 NOTE — PROGRESS NOTE ADULT - PROBLEM SELECTOR PLAN 3
s/p cabg 2004  s/pcoronary stent 2 weeks ago  no angina
ruled out for AMI
s/p cabg 2004  s/pcoronary stent 2 weeks ago  no angina
ruled out for AMI

## 2019-09-20 NOTE — CONSULT NOTE ADULT - PROBLEM SELECTOR RECOMMENDATION 9
change mod dose humalog scale coverage  add lantus 10 units qam  goal bg 100-180 in hosp setting  oral anti-diabetes agents on hold
See  above under assessment

## 2019-09-20 NOTE — PROGRESS NOTE ADULT - PROBLEM SELECTOR PLAN 6
on insulin coverage
corrective regimen sliding scale
on insulin coverage
corrective regimen sliding scale

## 2019-09-20 NOTE — PHYSICAL THERAPY INITIAL EVALUATION ADULT - PRECAUTIONS/LIMITATIONS, REHAB EVAL
fall precautions/oxygen therapy device and L/min/no known precautions/limitations/Supplemental 02 at home

## 2019-09-20 NOTE — DISCHARGE NOTE NURSING/CASE MANAGEMENT/SOCIAL WORK - PATIENT PORTAL LINK FT
You can access the FollowMyHealth Patient Portal offered by [facility] by registering at the following website: [email or web address]. By joining [employer]’s FollowMyHealth portal, you will also be able to view your health information using other applications (apps) compatible with our system.

## 2019-09-20 NOTE — PROGRESS NOTE ADULT - PROBLEM SELECTOR PLAN 5
on lipitor
continue home medications ,cardiology consult noted
on lipitor
continue home medications ,cardiology consult noted

## 2019-09-20 NOTE — DISCHARGE NOTE PROVIDER - HOSPITAL COURSE
78 yo AAM presents with SOB  since this this morning.  Pt. has a history of COPD with frequent  copd exacerbation.  No fever or inciting event.  Notes that he had stents placed 2 weeks ago at [facility] .  Diagnosed with COPD EXACERBATION AND ACUTE  RESPIRATORY FAILURE ,placed on BIPAP , Admit for intravenous steroids nebulized bronchodilators and pulmonology evaluation,O2 supply, serial labs and chest xrays ,obtain ECHO to evaluate LVEF and rule out chf component ROS: negative for fever, headache, chest pain, abd pain, nausea, vomiting, diarrhea, rash, paresthesia, and weakness--all other systems reviewed are negative Admitted  to telemetry unit for monitoring , send 3 sets of cardiac ensymes to rule out coronary event, obtain ECHO to evaluate LVEF, cardiology consult ,continue current management, O2 supply, anticoagulation plan as per cardiology consult PMH: HTN, COPD (On home O2 2L and BIPAP at while sleeping regardless of time of day), CAD (w/ 3v CABG 2004), HLD, DM2 (on Metformin), BPH, 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     ·  Problem: COPD exacerbation.  Plan: improved ,  continue current management and present  medications ,patient has O2 at home ,taper prednisone ,continue home inhalers as per Dr Dejesus.     ·  Problem: Respiratory failure.  Plan: resolved , f/up with pulm cons.     ·  Problem: CAD (Coronary Artery Disease).  Plan: ruled out for AMI.     ·  Problem: PVD (peripheral vascular disease).  Plan: continue current management and present  medications.     ·  Problem: Hypertension.  Plan: continue home medications ,cardiology consult noted.     Problem: Diabetes Mellitus, Type II. Plan: corrective regimen sliding scale.    ·  Problem: Prophylactic measure.  Plan: Gastrointestinal stress ulcer prophylaxis and DVT prophylaxis administrated.

## 2019-09-20 NOTE — PROGRESS NOTE ADULT - PROBLEM SELECTOR PLAN 4
controlled
continue current management and present  medications
controlled
continue current management and present  medications

## 2019-09-20 NOTE — PROGRESS NOTE ADULT - PROBLEM SELECTOR PROBLEM 6
Diabetes Mellitus, Type II

## 2019-09-20 NOTE — PROGRESS NOTE ADULT - PROBLEM SELECTOR PROBLEM 3
CAD (Coronary Artery Disease)

## 2019-09-20 NOTE — PROGRESS NOTE ADULT - SUBJECTIVE AND OBJECTIVE BOX
Patient is a 79y Male with a known history of :  Acute hypercapnic respiratory failure (J96.02)  Dyslipidemia (E78.5)  Prophylactic measure (Z29.9)  Diabetes Mellitus, Type II (E11.9)  Hypertension (I10)  PVD (peripheral vascular disease) (I73.9)  CAD (Coronary Artery Disease) (I25.10)  Respiratory failure (J96.90)  COPD exacerbation (J44.1)    HPI:  80 yo AAM presents with SOB  since this this morning.  Pt. has a history of COPD with frequent  copd exacerbation.  No fever or inciting event.  Notes that he had stents placed 2 weeks ago at [facility] .  Diagnosed with COPD EXACERBATION AND ACUTE  RESPIRATORY FAILURE ,placed on BIPAP , Admit for intravenous steroids nebulized bronchodilators and pulmonology evaluation,O2 supply, serial labs and chest xrays ,obtain ECHO to evaluate LVEF and rule out chf component ROS: negative for fever, headache, chest pain, abd pain, nausea, vomiting, diarrhea, rash, paresthesia, and weakness--all other systems reviewed are negative Admitted  to telemetry unit for monitoring , send 3 sets of cardiac ensymes to rule out coronary event, obtain ECHO to evaluate LVEF, cardiology consult ,continue current management, O2 supply, anticoagulation plan as per cardiology consult PMH: HTN, COPD (On home O2 2L and BIPAP at while sleeping regardless of time of day), CAD (w/ 3v CABG 2004), HLD, DM2 (on Metformin), BPH, 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 (18 Sep 2019 15:32)      REVIEW OF SYSTEMS:    CONSTITUTIONAL: No fever, weight loss, or fatigue  EYES: No eye pain, visual disturbances, or discharge  ENMT:  No difficulty hearing, tinnitus, vertigo; No sinus or throat pain  NECK: No pain or stiffness  BREASTS: No pain, masses, or nipple discharge  RESPIRATORY: No cough, wheezing, chills or hemoptysis; No shortness of breath  CARDIOVASCULAR: No chest pain, palpitations, dizziness, or leg swelling  GASTROINTESTINAL: No abdominal or epigastric pain. No nausea, vomiting, or hematemesis; No diarrhea or constipation. No melena or hematochezia.  GENITOURINARY: No dysuria, frequency, hematuria, or incontinence  NEUROLOGICAL: No headaches, memory loss, loss of strength, numbness, or tremors  SKIN: No itching, burning, rashes, or lesions   LYMPH NODES: No enlarged glands  ENDOCRINE: No heat or cold intolerance; No hair loss  MUSCULOSKELETAL: No joint pain or swelling; No muscle, back, or extremity pain  PSYCHIATRIC: No depression, anxiety, mood swings, or difficulty sleeping  HEME/LYMPH: No easy bruising, or bleeding gums  ALLERGY AND IMMUNOLOGIC: No hives or eczema    MEDICATIONS  (STANDING):  ALBUTerol/ipratropium for Nebulization 3 milliLiter(s) Nebulizer every 6 hours  aspirin enteric coated 81 milliGRAM(s) Oral daily  atorvastatin 40 milliGRAM(s) Oral at bedtime  azithromycin  IVPB 500 milliGRAM(s) IV Intermittent every 24 hours  azithromycin  IVPB      buDESOnide    Inhalation Suspension 0.5 milliGRAM(s) Inhalation two times a day  clopidogrel Tablet 75 milliGRAM(s) Oral daily  dextrose 5%. 1000 milliLiter(s) (50 mL/Hr) IV Continuous <Continuous>  dextrose 50% Injectable 12.5 Gram(s) IV Push once  dextrose 50% Injectable 25 Gram(s) IV Push once  dextrose 50% Injectable 25 Gram(s) IV Push once  enoxaparin Injectable 40 milliGRAM(s) SubCutaneous daily  guaiFENesin  milliGRAM(s) Oral every 12 hours  influenza   Vaccine 0.5 milliLiter(s) IntraMuscular once  insulin glargine Injectable (LANTUS) 10 Unit(s) SubCutaneous once  insulin lispro (HumaLOG) corrective regimen sliding scale   SubCutaneous three times a day before meals  insulin lispro (HumaLOG) corrective regimen sliding scale   SubCutaneous at bedtime  losartan 50 milliGRAM(s) Oral daily  methylPREDNISolone sodium succinate Injectable 40 milliGRAM(s) IV Push every 8 hours  metoprolol tartrate 12.5 milliGRAM(s) Oral every 12 hours  pantoprazole    Tablet 40 milliGRAM(s) Oral before breakfast  tamsulosin 0.4 milliGRAM(s) Oral at bedtime    MEDICATIONS  (PRN):  acetaminophen   Tablet .. 650 milliGRAM(s) Oral every 6 hours PRN Temp greater or equal to 38C (100.4F), Mild Pain (1 - 3)  dextrose 40% Gel 15 Gram(s) Oral once PRN Blood Glucose LESS THAN 70 milliGRAM(s)/deciliter  glucagon  Injectable 1 milliGRAM(s) IntraMuscular once PRN Glucose LESS THAN 70 milligrams/deciliter      ALLERGIES: No Known Allergies      FAMILY HISTORY:  Family history of diabetes mellitus (Sibling)      PHYSICAL EXAMINATION:  -----------------------------  T(C): 36.8 (09-20-19 @ 07:34), Max: 36.8 (09-19-19 @ 15:18)  HR: 65 (09-20-19 @ 07:40) (61 - 99)  BP: 112/69 (09-20-19 @ 07:34) (98/60 - 137/82)  RR: 16 (09-20-19 @ 07:34) (16 - 19)  SpO2: 65% (09-20-19 @ 07:40) (65% - 100%)  Wt(kg): --    09-19 @ 07:01  -  09-20 @ 07:00  --------------------------------------------------------  IN:    IV PiggyBack: 250 mL  Total IN: 250 mL    OUT:    Voided: 2800 mL  Total OUT: 2800 mL    Total NET: -2550 mL            Constitutional: well developed, normal appearance, well groomed, well nourished, no deformities and no acute distress.   Eyes: the conjunctiva exhibited no abnormalities and the eyelids demonstrated no xanthelasmas.   HEENT: normal oral mucosa, no oral pallor and no oral cyanosis.   Neck: normal jugular venous A waves present, normal jugular venous V waves present and no jugular venous cummins A waves.   Pulmonary: no respiratory distress, normal respiratory rhythm and effort, no accessory muscle use and lungs were clear to auscultation bilaterally.   Cardiovascular: heart rate and rhythm were normal, normal S1 and S2 and no murmur, gallop, rub, heave or thrill are present.   Abdomen: soft, non-tender, no hepato-splenomegaly and no abdominal mass palpated.   Musculoskeletal: the gait could not be assessed..   Extremities: no clubbing of the fingernails, no localized cyanosis, no petechial hemorrhages and no ischemic changes.   Skin: normal skin color and pigmentation, no rash, no venous stasis, no skin lesions, no skin ulcer and no xanthoma was observed.   Psychiatric: oriented to person, place, and time, the affect was normal, the mood was normal and not feeling anxious.     LABS:   --------  09-20    140  |  100  |  26<H>  ----------------------------<  305<H>  4.7   |  29  |  1.30    Ca    9.4      20 Sep 2019 06:41  Phos  3.2     09-19    TPro  6.6  /  Alb  3.3  /  TBili  0.4  /  DBili  x   /  AST  14<L>  /  ALT  30  /  AlkPhos  80  09-19                         10.3   11.92 )-----------( 266      ( 20 Sep 2019 06:41 )             33.7     PT/INR - ( 19 Sep 2019 07:01 )   PT: 11.6 sec;   INR: 1.03 ratio         PTT - ( 18 Sep 2019 12:21 )  PTT:29.9 sec    09-19 @ 07:01 CPK total:--, CKMB --, Troponin I - <.015 ng/mL  09-18 @ 19:12 CPK total:--, CKMB --, Troponin I - <.015 ng/mL  09-18 @ 12:21 CPK total:--, CKMB --, Troponin I - <.015 ng/mL      Culture Results:   No growth to date. (09-18 @ 21:20)      RADIOLOGY:  -----------------        ECG:

## 2019-09-20 NOTE — CONSULT NOTE ADULT - SUBJECTIVE AND OBJECTIVE BOX
Patient is a 79y old  Male who presents with a chief complaint of SOB (19 Sep 2019 10:47)      Reason For Consult: dm2 uncontrolled    HPI:  78 yo AAM presents with SOB  since this this morning.  Pt. has a history of COPD with frequent  copd exacerbation.  No fever or inciting event.  Notes that he had stents placed 2 weeks ago at [facility] .  Diagnosed with COPD EXACERBATION AND ACUTE  RESPIRATORY FAILURE ,placed on BIPAP , Admit for intravenous steroids nebulized bronchodilators and pulmonology evaluation,O2 supply, serial labs and chest xrays ,obtain ECHO to evaluate LVEF and rule out chf component ROS: negative for fever, headache, chest pain, abd pain, nausea, vomiting, diarrhea, rash, paresthesia, and weakness--all other systems reviewed are negative Admitted  to telemetry unit for monitoring , send 3 sets of cardiac ensymes to rule out coronary event, obtain ECHO to evaluate LVEF, cardiology consult ,continue current management, O2 supply, anticoagulation plan as per cardiology consult PMH: HTN, COPD (On home O2 2L and BIPAP at while sleeping regardless of time of day), CAD (w/ 3v CABG 2004), HLD, DM2 (on Metformin), BPH, 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 (18 Sep 2019 15:32)      PAST MEDICAL & SURGICAL HISTORY:  PVD (peripheral vascular disease)  Hypertension  COPD (chronic obstructive pulmonary disease): on 2L at home and BiPAP at night; intubated 6/18  Osteomyelitis  Dyslipidemia  CAD (Coronary Artery Disease): s/p 3v CABG 2004  Diabetes Mellitus, Type II  S/P primary angioplasty with coronary stent  Compound fracture: left leg  CABG (Coronary Artery Bypass Graft): 2004      FAMILY HISTORY:  Family history of diabetes mellitus (Sibling)        Social History:    MEDICATIONS  (STANDING):  ALBUTerol/ipratropium for Nebulization 3 milliLiter(s) Nebulizer every 6 hours  aspirin enteric coated 81 milliGRAM(s) Oral daily  atorvastatin 40 milliGRAM(s) Oral at bedtime  azithromycin  IVPB 500 milliGRAM(s) IV Intermittent every 24 hours  azithromycin  IVPB      buDESOnide    Inhalation Suspension 0.5 milliGRAM(s) Inhalation two times a day  clopidogrel Tablet 75 milliGRAM(s) Oral daily  dextrose 5%. 1000 milliLiter(s) (50 mL/Hr) IV Continuous <Continuous>  dextrose 50% Injectable 12.5 Gram(s) IV Push once  dextrose 50% Injectable 25 Gram(s) IV Push once  dextrose 50% Injectable 25 Gram(s) IV Push once  enoxaparin Injectable 40 milliGRAM(s) SubCutaneous daily  guaiFENesin  milliGRAM(s) Oral every 12 hours  influenza   Vaccine 0.5 milliLiter(s) IntraMuscular once  insulin lispro (HumaLOG) corrective regimen sliding scale   SubCutaneous three times a day before meals  insulin lispro (HumaLOG) corrective regimen sliding scale   SubCutaneous at bedtime  losartan 50 milliGRAM(s) Oral daily  methylPREDNISolone sodium succinate Injectable 40 milliGRAM(s) IV Push every 8 hours  metoprolol tartrate 12.5 milliGRAM(s) Oral every 12 hours  pantoprazole    Tablet 40 milliGRAM(s) Oral before breakfast  tamsulosin 0.4 milliGRAM(s) Oral at bedtime    MEDICATIONS  (PRN):  acetaminophen   Tablet .. 650 milliGRAM(s) Oral every 6 hours PRN Temp greater or equal to 38C (100.4F), Mild Pain (1 - 3)  dextrose 40% Gel 15 Gram(s) Oral once PRN Blood Glucose LESS THAN 70 milliGRAM(s)/deciliter  glucagon  Injectable 1 milliGRAM(s) IntraMuscular once PRN Glucose LESS THAN 70 milligrams/deciliter        T(C): 36.8 (09-20-19 @ 07:34), Max: 36.8 (09-19-19 @ 15:18)  HR: 99 (09-20-19 @ 07:40) (61 - 99)  BP: 112/69 (09-20-19 @ 07:34) (98/60 - 137/82)  RR: 16 (09-20-19 @ 07:34) (16 - 19)  SpO2: 65% (09-20-19 @ 07:40) (65% - 100%)  Wt(kg): --    PHYSICAL EXAM:  GENERAL: NAD, well-groomed, well-developed  HEAD:  Atraumatic, Normocephalic  NECK: Supple, No JVD, Normal thyroid  CHEST/LUNG: Clear to percussion bilaterally; No rales, rhonchi, wheezing, or rubs  HEART: Regular rate and rhythm; No murmurs, rubs, or gallops  ABDOMEN: Soft, Nontender, Nondistended; Bowel sounds present  EXTREMITIES:  2+ Peripheral Pulses, No clubbing, cyanosis, or edema  SKIN: No rashes or lesions    CAPILLARY BLOOD GLUCOSE      POCT Blood Glucose.: 301 mg/dL (19 Sep 2019 23:06)  POCT Blood Glucose.: 302 mg/dL (19 Sep 2019 21:46)  POCT Blood Glucose.: 308 mg/dL (19 Sep 2019 16:53)  POCT Blood Glucose.: 327 mg/dL (19 Sep 2019 11:56)                            10.3   11.92 )-----------( 266      ( 20 Sep 2019 06:41 )             33.7       CMP:  09-20 @ 06:41  SGPT --  Albumin --   Alk Phos --   Anion Gap 11   SGOT --   Total Bili --   BUN 26   Calcium Total 9.4   CO2 29   Chloride 100   Creatinine 1.30   eGFR if AA 60   eGFR if non AA 52   Glucose 305   Potassium 4.7   Protein --   Sodium 140      Thyroid Function Tests:      Diabetes Tests:       Radiology:

## 2019-09-20 NOTE — PROGRESS NOTE ADULT - SUBJECTIVE AND OBJECTIVE BOX
PROGRESS NOTE  Patient is a 79y old  Male who presents with a chief complaint of SOB (20 Sep 2019 11:40)  LATE ENTRY- patient was seen and examined earlier today 9 am  . Plan of care was discussed with med staff and unit coordinator .   Chart and available morning labs /imaging are reviewed electronically , urgent issues addressed . More information  is being added upon completion of rounds , when more information is collected and management discussed with consultants , medical staff and social service/case management on the floor   OVERNIGHT  No new issues reported by medical staff . All above noted Patient is resting in a bed comfortably  .No distress noted   Feels better ,cleared by cardiologist and pulmonologist for d/c   HPI:  78 yo AAM presents with SOB  since this this morning.  Pt. has a history of COPD with frequent  copd exacerbation.  No fever or inciting event.  Notes that he had stents placed 2 weeks ago at [facility] .  Diagnosed with COPD EXACERBATION AND ACUTE  RESPIRATORY FAILURE ,placed on BIPAP , Admit for intravenous steroids nebulized bronchodilators and pulmonology evaluation,O2 supply, serial labs and chest xrays ,obtain ECHO to evaluate LVEF and rule out chf component ROS: negative for fever, headache, chest pain, abd pain, nausea, vomiting, diarrhea, rash, paresthesia, and weakness--all other systems reviewed are negative Admitted  to telemetry unit for monitoring , send 3 sets of cardiac ensymes to rule out coronary event, obtain ECHO to evaluate LVEF, cardiology consult ,continue current management, O2 supply, anticoagulation plan as per cardiology consult PMH: HTN, COPD (On home O2 2L and BIPAP at while sleeping regardless of time of day), CAD (w/ 3v CABG 2004), HLD, DM2 (on Metformin), BPH, 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 (18 Sep 2019 15:32)    PAST MEDICAL & SURGICAL HISTORY:  PVD (peripheral vascular disease)  Hypertension  COPD (chronic obstructive pulmonary disease): on 2L at home and BiPAP at night; intubated 6/18  Osteomyelitis  Dyslipidemia  CAD (Coronary Artery Disease): s/p 3v CABG 2004  Diabetes Mellitus, Type II  S/P primary angioplasty with coronary stent  Compound fracture: left leg  CABG (Coronary Artery Bypass Graft): 2004      MEDICATIONS  (STANDING):  ALBUTerol/ipratropium for Nebulization 3 milliLiter(s) Nebulizer every 6 hours  aspirin enteric coated 81 milliGRAM(s) Oral daily  atorvastatin 40 milliGRAM(s) Oral at bedtime  azithromycin  IVPB 500 milliGRAM(s) IV Intermittent every 24 hours  azithromycin  IVPB      buDESOnide    Inhalation Suspension 0.5 milliGRAM(s) Inhalation two times a day  clopidogrel Tablet 75 milliGRAM(s) Oral daily  dextrose 5%. 1000 milliLiter(s) (50 mL/Hr) IV Continuous <Continuous>  dextrose 50% Injectable 12.5 Gram(s) IV Push once  dextrose 50% Injectable 25 Gram(s) IV Push once  dextrose 50% Injectable 25 Gram(s) IV Push once  enoxaparin Injectable 40 milliGRAM(s) SubCutaneous daily  guaiFENesin  milliGRAM(s) Oral every 12 hours  influenza   Vaccine 0.5 milliLiter(s) IntraMuscular once  insulin lispro (HumaLOG) corrective regimen sliding scale   SubCutaneous three times a day before meals  insulin lispro (HumaLOG) corrective regimen sliding scale   SubCutaneous at bedtime  losartan 50 milliGRAM(s) Oral daily  metoprolol tartrate 12.5 milliGRAM(s) Oral every 12 hours  pantoprazole    Tablet 40 milliGRAM(s) Oral before breakfast  tamsulosin 0.4 milliGRAM(s) Oral at bedtime    MEDICATIONS  (PRN):  acetaminophen   Tablet .. 650 milliGRAM(s) Oral every 6 hours PRN Temp greater or equal to 38C (100.4F), Mild Pain (1 - 3)  dextrose 40% Gel 15 Gram(s) Oral once PRN Blood Glucose LESS THAN 70 milliGRAM(s)/deciliter  glucagon  Injectable 1 milliGRAM(s) IntraMuscular once PRN Glucose LESS THAN 70 milligrams/deciliter      OBJECTIVE    T(C): 36.8 (09-20-19 @ 07:34), Max: 36.8 (09-19-19 @ 15:18)  HR: 65 (09-20-19 @ 07:40) (61 - 99)  BP: 112/69 (09-20-19 @ 07:34) (98/60 - 137/82)  RR: 16 (09-20-19 @ 07:34) (16 - 19)  SpO2: 97% (09-20-19 @ 11:22) (65% - 100%)  Wt(kg): --  I&O's Summary    19 Sep 2019 07:01  -  20 Sep 2019 07:00  --------------------------------------------------------  IN: 250 mL / OUT: 2800 mL / NET: -2550 mL          REVIEW OF SYSTEMS:  CONSTITUTIONAL: No fever, weight loss, or fatigue  EYES: No eye pain, visual disturbances, or discharge  ENMT:   No sinus or throat pain  NECK: No pain or stiffness  RESPIRATORY: No cough, wheezing, chills or hemoptysis; No shortness of breath  CARDIOVASCULAR: No chest pain, palpitations, dizziness, or leg swelling  GASTROINTESTINAL: No abdominal pain. No nausea, vomiting; No diarrhea or constipation. No melena or hematochezia.  GENITOURINARY: No dysuria, frequency, hematuria, or incontinence  NEUROLOGICAL: No headaches, memory loss, loss of strength, numbness, or tremors  SKIN: No itching, burning, rashes, or lesions   MUSCULOSKELETAL: No joint pain or swelling; No muscle, back, or extremity pain    PHYSICAL EXAM:  Appearance: NAD. VS past 24 hrs -as above   HEENT:   Moist oral mucosa. Conjunctiva clear b/l.   Neck : supple  Respiratory: Lungs CTAB.  Gastrointestinal:  Soft, nontender. No rebound. No rigidity. BS present	  Cardiovascular: RRR ,S1S2 present  Neurologic: Non-focal. Moving all extremities.  Extremities: No edema. No erythema. No calf tenderness.  Skin: No rashes, No ecchymoses, No cyanosis.	  wounds ,skin lesions-See skin assesment flow sheet   LABS:                        10.3   11.92 )-----------( 266      ( 20 Sep 2019 06:41 )             33.7     09-20    140  |  100  |  26<H>  ----------------------------<  305<H>  4.7   |  29  |  1.30    Ca    9.4      20 Sep 2019 06:41  Phos  3.2     09-19    TPro  6.6  /  Alb  3.3  /  TBili  0.4  /  DBili  x   /  AST  14<L>  /  ALT  30  /  AlkPhos  80  09-19    CAPILLARY BLOOD GLUCOSE      POCT Blood Glucose.: 339 mg/dL (20 Sep 2019 12:10)  POCT Blood Glucose.: 419 mg/dL (20 Sep 2019 09:25)  POCT Blood Glucose.: 288 mg/dL (20 Sep 2019 08:22)  POCT Blood Glucose.: 301 mg/dL (19 Sep 2019 23:06)  POCT Blood Glucose.: 302 mg/dL (19 Sep 2019 21:46)  POCT Blood Glucose.: 308 mg/dL (19 Sep 2019 16:53)    PT/INR - ( 19 Sep 2019 07:01 )   PT: 11.6 sec;   INR: 1.03 ratio               Culture - Blood (collected 18 Sep 2019 21:20)  Source: .Blood Blood  Preliminary Report (19 Sep 2019 22:01):    No growth to date.      RADIOLOGY & ADDITIONAL TESTS:   reviewed elctronically  Patient was seen and examined on a day of discharge . Plan of care , discharge medications and recommendations discussed with consultants and clearance for discharge obtained .Social service , case management  and medical staff are aware of plan. Family is notified. Discharge summary  is  prepared electronically -see separate attached document , 75 minutes spent on this visit, 50% visit time spent in care co-ordination with other attendings and counselling patient  I have discussed care plan with patient and HCP,expressed understanding of problems treatment and their effect and side effects, alternatives in detail,I have asked if they have any questions and concerns and appropriately addressed them to best of my ability

## 2019-09-20 NOTE — PROGRESS NOTE ADULT - NSHPATTENDINGPLANDISCUSS_GEN_ALL_CORE
med staff ,Dr DORANTES  , Dr Dejesus and wife at bedside
med staff ,Dr DORANTES  , Dr Dejesus and wife at bedside

## 2019-09-23 ENCOUNTER — APPOINTMENT (OUTPATIENT)
Dept: VASCULAR SURGERY | Facility: CLINIC | Age: 79
End: 2019-09-23
Payer: MEDICARE

## 2019-09-23 ENCOUNTER — TRANSCRIPTION ENCOUNTER (OUTPATIENT)
Age: 79
End: 2019-09-23

## 2019-09-23 VITALS
BODY MASS INDEX: 29.26 KG/M2 | WEIGHT: 209 LBS | SYSTOLIC BLOOD PRESSURE: 115 MMHG | DIASTOLIC BLOOD PRESSURE: 64 MMHG | TEMPERATURE: 98.3 F | HEART RATE: 90 BPM | HEIGHT: 71 IN

## 2019-09-23 LAB
CULTURE RESULTS: SIGNIFICANT CHANGE UP
SPECIMEN SOURCE: SIGNIFICANT CHANGE UP

## 2019-09-23 PROCEDURE — 99213 OFFICE O/P EST LOW 20 MIN: CPT

## 2019-09-23 PROCEDURE — 93978 VASCULAR STUDY: CPT

## 2019-09-23 PROCEDURE — 93926 LOWER EXTREMITY STUDY: CPT

## 2019-09-23 NOTE — ASSESSMENT
[FreeTextEntry1] : 77 yo male with history of  dm, htn, copd, pad s/p b/l iliac stents and right lower extremity sfa stent presents for follow up with persistant claudication of the left worse than the right .  Status post recent coronary angioplasty and stent placement. Bilateral lower extremity symptoms have become much more severe. Followup in 3-4 weeks for arterial Dopplers and subsequent angiogram. We will hold off over the next few weeks due to 2 recent coronary stent placement. Continuing to antiplatelet therapy

## 2019-09-23 NOTE — PHYSICAL EXAM
[JVD] : no jugular venous distention  [Normal Heart Sounds] : normal heart sounds [Normal Breath Sounds] : Normal breath sounds [2+] : right 2+ [] : not present [Ankle Swelling (On Exam)] : not present [Varicose Veins Of Lower Extremities] : not present [Skin Ulcer] : no ulcer [Abdomen Masses] : No abdominal masses [Oriented to Place] : oriented to place

## 2019-09-23 NOTE — HISTORY OF PRESENT ILLNESS
[FreeTextEntry1] : 79 yo male with history of dm, htn, copd, pad s/p b/l iliac stents and right lower extremity sfa stent.  pt reports left lower extremity stable claudications of less then One block  of the left lower extremity.  Status post recent coronary angioplasty and stent placement
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
## 2019-10-01 ENCOUNTER — EMERGENCY (EMERGENCY)
Facility: HOSPITAL | Age: 79
LOS: 1 days | Discharge: HOME | End: 2019-10-01
Attending: EMERGENCY MEDICINE | Admitting: EMERGENCY MEDICINE
Payer: COMMERCIAL

## 2019-10-01 VITALS
RESPIRATION RATE: 18 BRPM | WEIGHT: 315 LBS | DIASTOLIC BLOOD PRESSURE: 84 MMHG | OXYGEN SATURATION: 94 % | SYSTOLIC BLOOD PRESSURE: 149 MMHG | TEMPERATURE: 98 F | HEART RATE: 100 BPM | HEIGHT: 71 IN

## 2019-10-01 VITALS
RESPIRATION RATE: 20 BRPM | HEART RATE: 96 BPM | DIASTOLIC BLOOD PRESSURE: 64 MMHG | OXYGEN SATURATION: 99 % | SYSTOLIC BLOOD PRESSURE: 126 MMHG

## 2019-10-01 DIAGNOSIS — T14.8XXA OTHER INJURY OF UNSPECIFIED BODY REGION, INITIAL ENCOUNTER: Chronic | ICD-10-CM

## 2019-10-01 DIAGNOSIS — Z95.5 PRESENCE OF CORONARY ANGIOPLASTY IMPLANT AND GRAFT: Chronic | ICD-10-CM

## 2019-10-01 LAB
ANION GAP SERPL CALC-SCNC: 5 MMOL/L — SIGNIFICANT CHANGE UP (ref 5–17)
BASE EXCESS BLDA CALC-SCNC: 2.3 MMOL/L — HIGH (ref -2–2)
BLOOD GAS COMMENTS ARTERIAL: SIGNIFICANT CHANGE UP
BUN SERPL-MCNC: 12 MG/DL — SIGNIFICANT CHANGE UP (ref 7–23)
CALCIUM SERPL-MCNC: 9.4 MG/DL — SIGNIFICANT CHANGE UP (ref 8.5–10.1)
CHLORIDE SERPL-SCNC: 108 MMOL/L — SIGNIFICANT CHANGE UP (ref 96–108)
CO2 SERPL-SCNC: 28 MMOL/L — SIGNIFICANT CHANGE UP (ref 22–31)
CREAT SERPL-MCNC: 1.1 MG/DL — SIGNIFICANT CHANGE UP (ref 0.5–1.3)
GLUCOSE SERPL-MCNC: 178 MG/DL — HIGH (ref 70–99)
HCO3 BLDA-SCNC: 26 MMOL/L — SIGNIFICANT CHANGE UP (ref 23–27)
HCT VFR BLD CALC: 39.6 % — SIGNIFICANT CHANGE UP (ref 39–50)
HGB BLD-MCNC: 12.1 G/DL — LOW (ref 13–17)
HOROWITZ INDEX BLDA+IHG-RTO: 36 — SIGNIFICANT CHANGE UP
MCHC RBC-ENTMCNC: 27.3 PG — SIGNIFICANT CHANGE UP (ref 27–34)
MCHC RBC-ENTMCNC: 30.6 GM/DL — LOW (ref 32–36)
MCV RBC AUTO: 89.4 FL — SIGNIFICANT CHANGE UP (ref 80–100)
NRBC # BLD: 0 /100 WBCS — SIGNIFICANT CHANGE UP (ref 0–0)
NT-PROBNP SERPL-SCNC: 154 PG/ML — SIGNIFICANT CHANGE UP (ref 0–450)
PCO2 BLDA: 58 MMHG — HIGH (ref 32–46)
PH BLDA: 7.31 — LOW (ref 7.35–7.45)
PLATELET # BLD AUTO: 266 K/UL — SIGNIFICANT CHANGE UP (ref 150–400)
PO2 BLDA: 182 MMHG — HIGH (ref 74–108)
POTASSIUM SERPL-MCNC: 4.6 MMOL/L — SIGNIFICANT CHANGE UP (ref 3.5–5.3)
POTASSIUM SERPL-SCNC: 4.6 MMOL/L — SIGNIFICANT CHANGE UP (ref 3.5–5.3)
RAPID RVP RESULT: SIGNIFICANT CHANGE UP
RBC # BLD: 4.43 M/UL — SIGNIFICANT CHANGE UP (ref 4.2–5.8)
RBC # FLD: 13.1 % — SIGNIFICANT CHANGE UP (ref 10.3–14.5)
SAO2 % BLDA: 100 % — HIGH (ref 92–96)
SODIUM SERPL-SCNC: 141 MMOL/L — SIGNIFICANT CHANGE UP (ref 135–145)
WBC # BLD: 11.11 K/UL — HIGH (ref 3.8–10.5)
WBC # FLD AUTO: 11.11 K/UL — HIGH (ref 3.8–10.5)

## 2019-10-01 PROCEDURE — 80048 BASIC METABOLIC PNL TOTAL CA: CPT

## 2019-10-01 PROCEDURE — 96365 THER/PROPH/DIAG IV INF INIT: CPT

## 2019-10-01 PROCEDURE — 87798 DETECT AGENT NOS DNA AMP: CPT

## 2019-10-01 PROCEDURE — 94660 CPAP INITIATION&MGMT: CPT

## 2019-10-01 PROCEDURE — 87486 CHLMYD PNEUM DNA AMP PROBE: CPT

## 2019-10-01 PROCEDURE — 36415 COLL VENOUS BLD VENIPUNCTURE: CPT

## 2019-10-01 PROCEDURE — 96375 TX/PRO/DX INJ NEW DRUG ADDON: CPT

## 2019-10-01 PROCEDURE — 99285 EMERGENCY DEPT VISIT HI MDM: CPT | Mod: 25

## 2019-10-01 PROCEDURE — 93005 ELECTROCARDIOGRAM TRACING: CPT

## 2019-10-01 PROCEDURE — 94640 AIRWAY INHALATION TREATMENT: CPT

## 2019-10-01 PROCEDURE — 82803 BLOOD GASES ANY COMBINATION: CPT

## 2019-10-01 PROCEDURE — 93010 ELECTROCARDIOGRAM REPORT: CPT

## 2019-10-01 PROCEDURE — 87581 M.PNEUMON DNA AMP PROBE: CPT

## 2019-10-01 PROCEDURE — 99285 EMERGENCY DEPT VISIT HI MDM: CPT

## 2019-10-01 PROCEDURE — 84145 PROCALCITONIN (PCT): CPT

## 2019-10-01 PROCEDURE — 71045 X-RAY EXAM CHEST 1 VIEW: CPT | Mod: 26

## 2019-10-01 PROCEDURE — 85027 COMPLETE CBC AUTOMATED: CPT

## 2019-10-01 PROCEDURE — 71045 X-RAY EXAM CHEST 1 VIEW: CPT

## 2019-10-01 PROCEDURE — 87633 RESP VIRUS 12-25 TARGETS: CPT

## 2019-10-01 PROCEDURE — 87040 BLOOD CULTURE FOR BACTERIA: CPT

## 2019-10-01 PROCEDURE — 84484 ASSAY OF TROPONIN QUANT: CPT

## 2019-10-01 PROCEDURE — 83880 ASSAY OF NATRIURETIC PEPTIDE: CPT

## 2019-10-01 RX ORDER — ALBUTEROL 90 UG/1
2.5 AEROSOL, METERED ORAL
Refills: 0 | Status: COMPLETED | OUTPATIENT
Start: 2019-10-01 | End: 2019-10-01

## 2019-10-01 RX ORDER — AZITHROMYCIN 500 MG/1
500 TABLET, FILM COATED ORAL ONCE
Refills: 0 | Status: COMPLETED | OUTPATIENT
Start: 2019-10-01 | End: 2019-10-01

## 2019-10-01 RX ORDER — AZITHROMYCIN 500 MG/1
500 TABLET, FILM COATED ORAL EVERY 24 HOURS
Refills: 0 | Status: DISCONTINUED | OUTPATIENT
Start: 2019-10-02 | End: 2019-10-05

## 2019-10-01 RX ORDER — AZITHROMYCIN 500 MG/1
TABLET, FILM COATED ORAL
Refills: 0 | Status: DISCONTINUED | OUTPATIENT
Start: 2019-10-01 | End: 2019-10-05

## 2019-10-01 RX ADMIN — AZITHROMYCIN 500 MILLIGRAM(S): 500 TABLET, FILM COATED ORAL at 13:40

## 2019-10-01 RX ADMIN — Medication 125 MILLIGRAM(S): at 09:39

## 2019-10-01 RX ADMIN — AZITHROMYCIN 255 MILLIGRAM(S): 500 TABLET, FILM COATED ORAL at 12:40

## 2019-10-01 RX ADMIN — ALBUTEROL 2.5 MILLIGRAM(S): 90 AEROSOL, METERED ORAL at 09:16

## 2019-10-01 RX ADMIN — ALBUTEROL 2.5 MILLIGRAM(S): 90 AEROSOL, METERED ORAL at 10:15

## 2019-10-01 RX ADMIN — ALBUTEROL 2.5 MILLIGRAM(S): 90 AEROSOL, METERED ORAL at 09:40

## 2019-10-01 NOTE — ED PROVIDER NOTE - CARE PROVIDER_API CALL
Arun Dejesus)  Critical Care Medicine; Internal Medicine; Pulmonary Disease  [address] [place] [address]  Phone: [phone]  Fax: [phone]  Follow Up Time:

## 2019-10-01 NOTE — ED PROVIDER NOTE - PROGRESS NOTE DETAILS
Case d/w Dr. Dejesus who will see patient in ED. Dr. Dejesus here and wants patient on 4-hours of BiPaP and then if patient still feel well, send patient home and he will follow-up with patient. Feels great at this time Off BiPaP and wants to go home.

## 2019-10-01 NOTE — ED ADULT NURSE NOTE - OBJECTIVE STATEMENT
Pt with COPD history to ED c/o SOB, wheezes notes bilaterally, afebrile, pt reports not feeling as if his bi-pap was providing enough O2, family at bedside, pt not in distress, will continue to monitor

## 2019-10-01 NOTE — ED PROVIDER NOTE - CONSTITUTIONAL, MLM
normal... Well appearing, black male, well nourished, awake, alert, oriented to person, place, time/situation and in mild apparent distress.

## 2019-10-01 NOTE — CONSULT NOTE ADULT - ASSESSMENT
PATIENT COMMODORE TURNER [facility] P [phone]  1940 DOEV 10/1/2019                           PULMONARY/CRITICAL CARE ASSESSMENT/RECOMMENDATIONS                    COPD ex AOC Hypercapnic resp failure   ABG acceptable on 4l Advised to decrease oxygen suppl  Procalc Troponin and BNP neg or unremarkable   Afeb No CP   Pt was given solumed and zithro in er   10/1/2019 dw pt and spouse  He wants to ge home Is feeling better Denies chest pain Did not use his bpap much last night  No travel No sickl contacts DW Dr Macario  Will observe few h on BPAP If imrpved will dc for fu as outpt and pt to return to er if worse Pt fam ER MD all agreed with suggestion   If DCed dc on pred 40 taper over 8 d to baseline dose and zpak                                  TIME SPENT Over 55 minutes aggregate care time spent on encounter; activities included   direct pt care, counseling and/or coordinating care reviewing notes, lab data/ imaging , discussion with multidisciplinary team/ pt /family. Risks, benefits, alternatives  discussed in detail.

## 2019-10-01 NOTE — CONSULT NOTE ADULT - SUBJECTIVE AND OBJECTIVE BOX
COM[facility] P [phone]  1940 DOEV 10/1/2019  ALLERGY Shellfish  CONTACT Oumar EATON      Initial evaluation/Pulmonary Critical Care consultation requested on  10/1/2019  by Dr Macario  from Dr Dejesus   Patient examined chart reviewed    HOSPITAL ADMISSION   PATIENT CAME  FROM (if information available)      PATIENT COMMOD[facility]UR[facility] P [phone]  1940 DOEV 10/1/2019                          PT DESCRIPTION       This section was excerpted from ER md note but was independently verified by me    · Chief Complaint: The patient is a 79y Male complaining of difficulty breathing.  · HPI Objective Statement: 80 yo black male with H/O CAD (Coronary Artery Disease)  s/p 3v CABG   COPD (chronic obstructive pulmonary disease)  on 2L at home and BiPAP at night; intubated   Diabetes Mellitus, Type II    Dyslipidemia    Hypertension    Osteomyelitis and  PVD (peripheral vascular disease) who was at baseline state of health until few days ago when he began to develop worsening SOB both on exertion and also slightly at rest. States that this feels like his COPD exacerbation. Admits to mild Orthopnea but no chest or abdominal pains and no cough, fever or chills.  · Presenting Symptoms: DIFFICULTY BREATHING, DYSPNEA ON EXERTION, SHORTNESS OF BREATH, WHEEZING  · Negative Findings: no body aches, no chest pain, no chills, no cough, no fever  · Timing: gradual onset  · Duration: day(s)  · Severity: PAIN SCALE 5 OF 10.  · Aggravated Factors: Exertion  · Relieving Factors: Rest    PAST MEDICAL/SURGICAL/FAMILY/SOCIAL HISTORY:    Past Medical History:  CAD (Coronary Artery Disease)  s/p 3v CABG   COPD (chronic obstructive pulmonary disease)  on 2L at home and BiPAP at night; intubated   Diabetes Mellitus, Type II    Dyslipidemia    Hypertension    Osteomyelitis    PVD (peripheral vascular disease).     Past Surgical History:  CABG (Coronary Artery Bypass Graft)    Compound fracture  left leg  S/P primary angioplasty with coronary stent.    · Marital Status:   · Lives With: spouse  · Social Concerns: None     Alcohol Use History:  · Have you ever consumed alcohol unknown     Tobacco Usage:  · Tobacco Usage Former smoker    · Attestation Comment: I have reviewed and confirmed nurses' notes for patient's medications, allergies, medical history, and surgical history.    ALLERGIES AND HOME MEDICATIONS:   Allergies:        Allergies:  No Known Allergies:        Intolerances:  shellfish: Food, Nausea, feels nauseous when eating crabs or shrimp but no true allergic reaction    Home Medications:   * Patient Currently Takes Medications as of 20-Sep-2019 14:36 documented in Structured Notes  · predniSONE 10 mg oral tablet: 3 tab(s) orally once a day x 2 days  2 tab(s) orally once a day x 2 days  1 tab(s) orally once a day x 2 days  · guaiFENesin 600 mg oral tablet, extended release: 1 tab(s) orally every 12 hours  · pantoprazole 40 mg oral delayed release tablet: 1 tab(s) orally once a day (before a meal)  · azithromycin 500 mg oral tablet: 1 tab(s) orally once a day  · acetaminophen 325 mg oral tablet: 2 tab(s) orally every 6 hours, As needed, Temp greater or equal to 38C (100.4F), Mild Pain (1 - 3)  · aspirin 81 mg oral delayed release tablet: 1 tab(s) orally once a day  · ipratropium-albuterol 0.5 mg-2.5 mg/3 mLinhalation solution: 3 milliliter(s) inhaled every 6 hours, As Needed -for shortness of breath and/or wheezing   · valsartan 80 mg oral tablet: 1 tab(s) orally once a day  · clopidogrel 75 mg oral tablet: 1 tab(s) orally once a day  · atorvastatin 40 mg oral tablet: 1 tab(s) orally once a day  · tamsulosin 0.4 mg oral capsule: 1 cap(s) orally once a day (at bedtime)  · Breo Ellipta 100 mcg-25 mcg/inh inhalation powder: 1 puff(s) inhaled once a day  · metFORMIN 1000 mg oral tablet: 1 tab(s) orally 2 times a day  · Incruse Ellipta 62.5 mcg/inh inhalation powder: 1 puff(s) inhaled once a day  · glipiZIDE 2.5 mg oral tablet, extended release: 1 tab(s) orally 2 times a day  · Metoprolol Tartrate 25 mg oral tablet: 1 tablet orally once a day, hold for sbp below 100 or hr below 60    REVIEW OF SYSTEMS:    Review of Systems:  · RESPIRATORY: - - -  · Respiratory [+]: EXERTIONAL DYSPNEA, SHORTNESS OF BREATH, Wheezing  · ROS STATEMENT: all other ROS negative except as per HPI    PHYSICAL EXAM:   · CONSTITUTIONAL: Well appearing, black male, well nourished, awake, alert, oriented to person, place, time/situation and in mild apparent distress.  · ENMT: Airway patent  · EYES: Clear bilaterally, pupils equal, round and reactive to light.  · CARDIAC: Normal rate, regular rhythm.  Heart sounds S1, S2.  No murmurs, rubs or gallops.  · RESPIRATORY: - - -  · Breath Sounds: DIMINISHED, WHEEZES  · Diminished Breath Sounds: DIFFUSE  · Wheezes: DIFFUSE  · GASTROINTESTINAL: Abdomen soft, non-tender, no guarding.  · MUSCULOSKELETAL: No muscle or joint tenderness  · NEUROLOGICAL: No motor or sensory deficits.              PATIENT [facility] P [phone]  1940 DOEV 10/1/2019    VITALS/LABS       10/1/2019 afeb 120/60   10/1/2019 16/6/.28   10/1/2019 W 11.1 Hb 12.1 Plt 266 Na 141 K 4.6 CO2 28 Cr 1.1  10/1/2019 Tr 1 n   10/1/2019 9a 4l 731/58/182   10/1/2019 CXR copd no active infiltr   10/1/2019 pc n         PATIENT   [facility] P [phone]  1940 DOEV 10/1/2019                             Pt description                          cc SOB HO 2 stents placed at [facility] few weeks ago presented with incr sob No cp No fever      HPI PMH   79 m former smoker PMH HTN, DM2 (on home Metformin and glipizide), COPD (2L home/ BIPAP at night), CAD with 3v CABG , Stent 2019 HLD, 10/26/2018 ECHO ef 55% hx hypercapnic resp failure with ho intubation c/b with cardiac arrest (2018) with ho recurrent admissions GOLD D recent admission  2019 with copd ex ac on chr hypercapnic resp failure  was evaluated in ER 10/1/2019  Pulm consulted by Dr Davis

## 2019-10-01 NOTE — ED PROVIDER NOTE - OBJECTIVE STATEMENT
78 yo black male with H/O CAD (Coronary Artery Disease)  s/p 3v CABG 2004  COPD (chronic obstructive pulmonary disease)  on 2L at home and BiPAP at night; intubated 6/18  Diabetes Mellitus, Type II    Dyslipidemia    Hypertension    Osteomyelitis and  PVD (peripheral vascular disease) who was at baseline state of health until few days ago when he began to develop worsening SOB both on exertion and also slightly at rest. States that this feels like his COPD exacerbation. Admits to mild Orthopnea but no chest or abdominal pains and no cough, fever or chills.

## 2019-10-01 NOTE — ED PROVIDER NOTE - PSH
CABG (Coronary Artery Bypass Graft)  2004  Compound fracture  left leg  S/P primary angioplasty with coronary stent

## 2019-10-01 NOTE — ED PROVIDER NOTE - CLINICAL SUMMARY MEDICAL DECISION MAKING FREE TEXT BOX
SOB and wheezing in this COPD patient requiring evaluation, labs, cxr and ecg as well as IVFs and meds.

## 2019-10-04 NOTE — ED PROVIDER NOTE - CADM POA URETHRAL CATHETER
Left detailed vm for pt  
Lets try trulance samples,  Placed on dru's desk  
PATIENT IS CALLING TO LET DR MORALEZ KNOW THAT THE AMITIZA SAMPLES HAVE NOT DOING ANYTHING.  
No

## 2019-10-06 LAB
CULTURE RESULTS: SIGNIFICANT CHANGE UP
SPECIMEN SOURCE: SIGNIFICANT CHANGE UP

## 2019-10-13 PROCEDURE — 71045 X-RAY EXAM CHEST 1 VIEW: CPT

## 2019-10-13 PROCEDURE — 80053 COMPREHEN METABOLIC PANEL: CPT

## 2019-10-13 PROCEDURE — 82803 BLOOD GASES ANY COMBINATION: CPT

## 2019-10-13 PROCEDURE — 97161 PT EVAL LOW COMPLEX 20 MIN: CPT

## 2019-10-13 PROCEDURE — 36600 WITHDRAWAL OF ARTERIAL BLOOD: CPT

## 2019-10-13 PROCEDURE — 87581 M.PNEUMON DNA AMP PROBE: CPT

## 2019-10-13 PROCEDURE — 87040 BLOOD CULTURE FOR BACTERIA: CPT

## 2019-10-13 PROCEDURE — 96375 TX/PRO/DX INJ NEW DRUG ADDON: CPT

## 2019-10-13 PROCEDURE — 85610 PROTHROMBIN TIME: CPT

## 2019-10-13 PROCEDURE — 87798 DETECT AGENT NOS DNA AMP: CPT

## 2019-10-13 PROCEDURE — 85027 COMPLETE CBC AUTOMATED: CPT

## 2019-10-13 PROCEDURE — 36415 COLL VENOUS BLD VENIPUNCTURE: CPT

## 2019-10-13 PROCEDURE — 80048 BASIC METABOLIC PNL TOTAL CA: CPT

## 2019-10-13 PROCEDURE — 84484 ASSAY OF TROPONIN QUANT: CPT

## 2019-10-13 PROCEDURE — 82962 GLUCOSE BLOOD TEST: CPT

## 2019-10-13 PROCEDURE — 94760 N-INVAS EAR/PLS OXIMETRY 1: CPT

## 2019-10-13 PROCEDURE — 93005 ELECTROCARDIOGRAM TRACING: CPT

## 2019-10-13 PROCEDURE — 87449 NOS EACH ORGANISM AG IA: CPT

## 2019-10-13 PROCEDURE — 84145 PROCALCITONIN (PCT): CPT

## 2019-10-13 PROCEDURE — 96365 THER/PROPH/DIAG IV INF INIT: CPT

## 2019-10-13 PROCEDURE — 99291 CRITICAL CARE FIRST HOUR: CPT

## 2019-10-13 PROCEDURE — 83880 ASSAY OF NATRIURETIC PEPTIDE: CPT

## 2019-10-13 PROCEDURE — 82553 CREATINE MB FRACTION: CPT

## 2019-10-13 PROCEDURE — 94660 CPAP INITIATION&MGMT: CPT

## 2019-10-13 PROCEDURE — 94640 AIRWAY INHALATION TREATMENT: CPT

## 2019-10-13 PROCEDURE — 87486 CHLMYD PNEUM DNA AMP PROBE: CPT

## 2019-10-13 PROCEDURE — 96366 THER/PROPH/DIAG IV INF ADDON: CPT

## 2019-10-13 PROCEDURE — 84100 ASSAY OF PHOSPHORUS: CPT

## 2019-10-13 PROCEDURE — 87633 RESP VIRUS 12-25 TARGETS: CPT

## 2019-10-13 PROCEDURE — 99221 1ST HOSP IP/OBS SF/LOW 40: CPT

## 2019-10-13 PROCEDURE — 85730 THROMBOPLASTIN TIME PARTIAL: CPT

## 2019-10-16 ENCOUNTER — INPATIENT (INPATIENT)
Facility: HOSPITAL | Age: 79
LOS: 1 days | Discharge: HOME | DRG: 190 | End: 2019-10-18
Attending: INTERNAL MEDICINE | Admitting: INTERNAL MEDICINE
Payer: COMMERCIAL

## 2019-10-16 VITALS
WEIGHT: 207.01 LBS | OXYGEN SATURATION: 78 % | RESPIRATION RATE: 30 BRPM | DIASTOLIC BLOOD PRESSURE: 90 MMHG | SYSTOLIC BLOOD PRESSURE: 177 MMHG | HEART RATE: 133 BPM

## 2019-10-16 DIAGNOSIS — E78.5 HYPERLIPIDEMIA, UNSPECIFIED: ICD-10-CM

## 2019-10-16 DIAGNOSIS — T14.8XXA OTHER INJURY OF UNSPECIFIED BODY REGION, INITIAL ENCOUNTER: Chronic | ICD-10-CM

## 2019-10-16 DIAGNOSIS — Z29.9 ENCOUNTER FOR PROPHYLACTIC MEASURES, UNSPECIFIED: ICD-10-CM

## 2019-10-16 DIAGNOSIS — J96.00 ACUTE RESPIRATORY FAILURE, UNSPECIFIED WHETHER WITH HYPOXIA OR HYPERCAPNIA: ICD-10-CM

## 2019-10-16 DIAGNOSIS — I10 ESSENTIAL (PRIMARY) HYPERTENSION: ICD-10-CM

## 2019-10-16 DIAGNOSIS — I48.91 UNSPECIFIED ATRIAL FIBRILLATION: ICD-10-CM

## 2019-10-16 DIAGNOSIS — I25.10 ATHEROSCLEROTIC HEART DISEASE OF NATIVE CORONARY ARTERY WITHOUT ANGINA PECTORIS: ICD-10-CM

## 2019-10-16 DIAGNOSIS — Z95.5 PRESENCE OF CORONARY ANGIOPLASTY IMPLANT AND GRAFT: Chronic | ICD-10-CM

## 2019-10-16 DIAGNOSIS — J44.9 CHRONIC OBSTRUCTIVE PULMONARY DISEASE, UNSPECIFIED: ICD-10-CM

## 2019-10-16 DIAGNOSIS — E11.9 TYPE 2 DIABETES MELLITUS WITHOUT COMPLICATIONS: ICD-10-CM

## 2019-10-16 DIAGNOSIS — I73.9 PERIPHERAL VASCULAR DISEASE, UNSPECIFIED: ICD-10-CM

## 2019-10-16 DIAGNOSIS — J18.9 PNEUMONIA, UNSPECIFIED ORGANISM: ICD-10-CM

## 2019-10-16 LAB
ALBUMIN SERPL ELPH-MCNC: 4 G/DL — SIGNIFICANT CHANGE UP (ref 3.3–5)
ALP SERPL-CCNC: 90 U/L — SIGNIFICANT CHANGE UP (ref 40–120)
ALT FLD-CCNC: 36 U/L — SIGNIFICANT CHANGE UP (ref 12–78)
ANION GAP SERPL CALC-SCNC: 8 MMOL/L — SIGNIFICANT CHANGE UP (ref 5–17)
AST SERPL-CCNC: 23 U/L — SIGNIFICANT CHANGE UP (ref 15–37)
BASE EXCESS BLDA CALC-SCNC: 1.4 MMOL/L — SIGNIFICANT CHANGE UP (ref -2–2)
BASE EXCESS BLDA CALC-SCNC: 2.3 MMOL/L — HIGH (ref -2–2)
BASOPHILS # BLD AUTO: 0.07 K/UL — SIGNIFICANT CHANGE UP (ref 0–0.2)
BASOPHILS NFR BLD AUTO: 0.5 % — SIGNIFICANT CHANGE UP (ref 0–2)
BILIRUB SERPL-MCNC: 0.4 MG/DL — SIGNIFICANT CHANGE UP (ref 0.2–1.2)
BLOOD GAS COMMENTS ARTERIAL: SIGNIFICANT CHANGE UP
BUN SERPL-MCNC: 15 MG/DL — SIGNIFICANT CHANGE UP (ref 7–23)
CALCIUM SERPL-MCNC: 9.9 MG/DL — SIGNIFICANT CHANGE UP (ref 8.5–10.1)
CHLORIDE SERPL-SCNC: 104 MMOL/L — SIGNIFICANT CHANGE UP (ref 96–108)
CK MB BLD-MCNC: 4.5 % — HIGH (ref 0–3.5)
CK MB CFR SERPL CALC: 3.9 NG/ML — HIGH (ref 0–3.6)
CK SERPL-CCNC: 87 U/L — SIGNIFICANT CHANGE UP (ref 26–308)
CO2 SERPL-SCNC: 30 MMOL/L — SIGNIFICANT CHANGE UP (ref 22–31)
CREAT SERPL-MCNC: 1.2 MG/DL — SIGNIFICANT CHANGE UP (ref 0.5–1.3)
EOSINOPHIL # BLD AUTO: 0.58 K/UL — HIGH (ref 0–0.5)
EOSINOPHIL NFR BLD AUTO: 4.2 % — SIGNIFICANT CHANGE UP (ref 0–6)
GLUCOSE SERPL-MCNC: 153 MG/DL — HIGH (ref 70–99)
HCO3 BLDA-SCNC: 24 MMOL/L — SIGNIFICANT CHANGE UP (ref 23–27)
HCO3 BLDA-SCNC: 25 MMOL/L — SIGNIFICANT CHANGE UP (ref 23–27)
HCT VFR BLD CALC: 40.5 % — SIGNIFICANT CHANGE UP (ref 39–50)
HGB BLD-MCNC: 12.4 G/DL — LOW (ref 13–17)
HOROWITZ INDEX BLDA+IHG-RTO: 30 — SIGNIFICANT CHANGE UP
HOROWITZ INDEX BLDA+IHG-RTO: 60 — SIGNIFICANT CHANGE UP
IMM GRANULOCYTES NFR BLD AUTO: 0.7 % — SIGNIFICANT CHANGE UP (ref 0–1.5)
LACTATE SERPL-SCNC: 2.7 MMOL/L — HIGH (ref 0.7–2)
LYMPHOCYTES # BLD AUTO: 28.7 % — SIGNIFICANT CHANGE UP (ref 13–44)
LYMPHOCYTES # BLD AUTO: 3.94 K/UL — HIGH (ref 1–3.3)
MCHC RBC-ENTMCNC: 27.4 PG — SIGNIFICANT CHANGE UP (ref 27–34)
MCHC RBC-ENTMCNC: 30.6 GM/DL — LOW (ref 32–36)
MCV RBC AUTO: 89.6 FL — SIGNIFICANT CHANGE UP (ref 80–100)
MONOCYTES # BLD AUTO: 1.5 K/UL — HIGH (ref 0–0.9)
MONOCYTES NFR BLD AUTO: 10.9 % — SIGNIFICANT CHANGE UP (ref 2–14)
NEUTROPHILS # BLD AUTO: 7.55 K/UL — HIGH (ref 1.8–7.4)
NEUTROPHILS NFR BLD AUTO: 55 % — SIGNIFICANT CHANGE UP (ref 43–77)
NRBC # BLD: 0 /100 WBCS — SIGNIFICANT CHANGE UP (ref 0–0)
NT-PROBNP SERPL-SCNC: 115 PG/ML — SIGNIFICANT CHANGE UP (ref 0–450)
PCO2 BLDA: 73 MMHG — CRITICAL HIGH (ref 32–46)
PCO2 BLDA: 75 MMHG — CRITICAL HIGH (ref 32–46)
PH BLDA: 7.2 — CRITICAL LOW (ref 7.35–7.45)
PH BLDA: 7.23 — LOW (ref 7.35–7.45)
PLATELET # BLD AUTO: 317 K/UL — SIGNIFICANT CHANGE UP (ref 150–400)
PO2 BLDA: 296 MMHG — HIGH (ref 74–108)
PO2 BLDA: 55 MMHG — LOW (ref 74–108)
POTASSIUM SERPL-MCNC: 4.3 MMOL/L — SIGNIFICANT CHANGE UP (ref 3.5–5.3)
POTASSIUM SERPL-SCNC: 4.3 MMOL/L — SIGNIFICANT CHANGE UP (ref 3.5–5.3)
PROT SERPL-MCNC: 7.2 G/DL — SIGNIFICANT CHANGE UP (ref 6–8.3)
RAPID RVP RESULT: SIGNIFICANT CHANGE UP
RBC # BLD: 4.52 M/UL — SIGNIFICANT CHANGE UP (ref 4.2–5.8)
RBC # FLD: 13.2 % — SIGNIFICANT CHANGE UP (ref 10.3–14.5)
SAO2 % BLDA: 100 % — HIGH (ref 92–96)
SAO2 % BLDA: 79 % — LOW (ref 92–96)
SODIUM SERPL-SCNC: 142 MMOL/L — SIGNIFICANT CHANGE UP (ref 135–145)
TROPONIN I SERPL-MCNC: 0.02 NG/ML — SIGNIFICANT CHANGE UP (ref 0.01–0.04)
TROPONIN I SERPL-MCNC: 0.02 NG/ML — SIGNIFICANT CHANGE UP (ref 0.01–0.04)
TROPONIN I SERPL-MCNC: <.015 NG/ML — SIGNIFICANT CHANGE UP (ref 0.01–0.04)
WBC # BLD: 13.73 K/UL — HIGH (ref 3.8–10.5)
WBC # FLD AUTO: 13.73 K/UL — HIGH (ref 3.8–10.5)

## 2019-10-16 PROCEDURE — 76604 US EXAM CHEST: CPT | Mod: 26

## 2019-10-16 PROCEDURE — 93010 ELECTROCARDIOGRAM REPORT: CPT

## 2019-10-16 PROCEDURE — 99291 CRITICAL CARE FIRST HOUR: CPT

## 2019-10-16 PROCEDURE — 93308 TTE F-UP OR LMTD: CPT | Mod: 26

## 2019-10-16 PROCEDURE — 71045 X-RAY EXAM CHEST 1 VIEW: CPT | Mod: 26

## 2019-10-16 RX ORDER — DEXTROSE 50 % IN WATER 50 %
25 SYRINGE (ML) INTRAVENOUS ONCE
Refills: 0 | Status: DISCONTINUED | OUTPATIENT
Start: 2019-10-16 | End: 2019-10-18

## 2019-10-16 RX ORDER — INSULIN LISPRO 100/ML
VIAL (ML) SUBCUTANEOUS EVERY 6 HOURS
Refills: 0 | Status: DISCONTINUED | OUTPATIENT
Start: 2019-10-16 | End: 2019-10-16

## 2019-10-16 RX ORDER — DEXTROSE 50 % IN WATER 50 %
15 SYRINGE (ML) INTRAVENOUS ONCE
Refills: 0 | Status: DISCONTINUED | OUTPATIENT
Start: 2019-10-16 | End: 2019-10-18

## 2019-10-16 RX ORDER — DEXTROSE 50 % IN WATER 50 %
12.5 SYRINGE (ML) INTRAVENOUS ONCE
Refills: 0 | Status: DISCONTINUED | OUTPATIENT
Start: 2019-10-16 | End: 2019-10-18

## 2019-10-16 RX ORDER — SODIUM CHLORIDE 9 MG/ML
1000 INJECTION INTRAMUSCULAR; INTRAVENOUS; SUBCUTANEOUS ONCE
Refills: 0 | Status: COMPLETED | OUTPATIENT
Start: 2019-10-16 | End: 2019-10-16

## 2019-10-16 RX ORDER — IPRATROPIUM/ALBUTEROL SULFATE 18-103MCG
3 AEROSOL WITH ADAPTER (GRAM) INHALATION EVERY 6 HOURS
Refills: 0 | Status: DISCONTINUED | OUTPATIENT
Start: 2019-10-16 | End: 2019-10-16

## 2019-10-16 RX ORDER — ASPIRIN/CALCIUM CARB/MAGNESIUM 324 MG
81 TABLET ORAL DAILY
Refills: 0 | Status: DISCONTINUED | OUTPATIENT
Start: 2019-10-16 | End: 2019-10-18

## 2019-10-16 RX ORDER — PIPERACILLIN AND TAZOBACTAM 4; .5 G/20ML; G/20ML
3.38 INJECTION, POWDER, LYOPHILIZED, FOR SOLUTION INTRAVENOUS ONCE
Refills: 0 | Status: DISCONTINUED | OUTPATIENT
Start: 2019-10-16 | End: 2019-10-16

## 2019-10-16 RX ORDER — BUDESONIDE, MICRONIZED 100 %
0.5 POWDER (GRAM) MISCELLANEOUS
Refills: 0 | Status: DISCONTINUED | OUTPATIENT
Start: 2019-10-16 | End: 2019-10-18

## 2019-10-16 RX ORDER — INSULIN LISPRO 100/ML
6 VIAL (ML) SUBCUTANEOUS ONCE
Refills: 0 | Status: COMPLETED | OUTPATIENT
Start: 2019-10-16 | End: 2019-10-16

## 2019-10-16 RX ORDER — SODIUM CHLORIDE 9 MG/ML
1000 INJECTION, SOLUTION INTRAVENOUS
Refills: 0 | Status: DISCONTINUED | OUTPATIENT
Start: 2019-10-16 | End: 2019-10-18

## 2019-10-16 RX ORDER — SODIUM CHLORIDE 9 MG/ML
1000 INJECTION INTRAMUSCULAR; INTRAVENOUS; SUBCUTANEOUS
Refills: 0 | Status: DISCONTINUED | OUTPATIENT
Start: 2019-10-16 | End: 2019-10-16

## 2019-10-16 RX ORDER — METOPROLOL TARTRATE 50 MG
25 TABLET ORAL
Qty: 0 | Refills: 0 | DISCHARGE

## 2019-10-16 RX ORDER — IPRATROPIUM/ALBUTEROL SULFATE 18-103MCG
3 AEROSOL WITH ADAPTER (GRAM) INHALATION
Refills: 0 | Status: COMPLETED | OUTPATIENT
Start: 2019-10-16 | End: 2019-10-16

## 2019-10-16 RX ORDER — ATORVASTATIN CALCIUM 80 MG/1
40 TABLET, FILM COATED ORAL AT BEDTIME
Refills: 0 | Status: DISCONTINUED | OUTPATIENT
Start: 2019-10-16 | End: 2019-10-18

## 2019-10-16 RX ORDER — CLOPIDOGREL BISULFATE 75 MG/1
75 TABLET, FILM COATED ORAL DAILY
Refills: 0 | Status: DISCONTINUED | OUTPATIENT
Start: 2019-10-16 | End: 2019-10-18

## 2019-10-16 RX ORDER — INSULIN LISPRO 100/ML
VIAL (ML) SUBCUTANEOUS EVERY 4 HOURS
Refills: 0 | Status: DISCONTINUED | OUTPATIENT
Start: 2019-10-16 | End: 2019-10-18

## 2019-10-16 RX ORDER — GLUCAGON INJECTION, SOLUTION 0.5 MG/.1ML
1 INJECTION, SOLUTION SUBCUTANEOUS ONCE
Refills: 0 | Status: DISCONTINUED | OUTPATIENT
Start: 2019-10-16 | End: 2019-10-18

## 2019-10-16 RX ORDER — PIPERACILLIN AND TAZOBACTAM 4; .5 G/20ML; G/20ML
3.38 INJECTION, POWDER, LYOPHILIZED, FOR SOLUTION INTRAVENOUS ONCE
Refills: 0 | Status: COMPLETED | OUTPATIENT
Start: 2019-10-16 | End: 2019-10-16

## 2019-10-16 RX ORDER — VANCOMYCIN HCL 1 G
1000 VIAL (EA) INTRAVENOUS EVERY 12 HOURS
Refills: 0 | Status: DISCONTINUED | OUTPATIENT
Start: 2019-10-16 | End: 2019-10-18

## 2019-10-16 RX ORDER — PIPERACILLIN AND TAZOBACTAM 4; .5 G/20ML; G/20ML
3.38 INJECTION, POWDER, LYOPHILIZED, FOR SOLUTION INTRAVENOUS EVERY 8 HOURS
Refills: 0 | Status: DISCONTINUED | OUTPATIENT
Start: 2019-10-16 | End: 2019-10-18

## 2019-10-16 RX ORDER — VANCOMYCIN HCL 1 G
1000 VIAL (EA) INTRAVENOUS ONCE
Refills: 0 | Status: COMPLETED | OUTPATIENT
Start: 2019-10-16 | End: 2019-10-16

## 2019-10-16 RX ORDER — METOPROLOL TARTRATE 50 MG
25 TABLET ORAL DAILY
Refills: 0 | Status: DISCONTINUED | OUTPATIENT
Start: 2019-10-16 | End: 2019-10-17

## 2019-10-16 RX ORDER — INFLUENZA VIRUS VACCINE 15; 15; 15; 15 UG/.5ML; UG/.5ML; UG/.5ML; UG/.5ML
0.5 SUSPENSION INTRAMUSCULAR ONCE
Refills: 0 | Status: COMPLETED | OUTPATIENT
Start: 2019-10-16 | End: 2019-10-18

## 2019-10-16 RX ORDER — HEPARIN SODIUM 5000 [USP'U]/ML
5000 INJECTION INTRAVENOUS; SUBCUTANEOUS EVERY 8 HOURS
Refills: 0 | Status: DISCONTINUED | OUTPATIENT
Start: 2019-10-16 | End: 2019-10-18

## 2019-10-16 RX ORDER — TAMSULOSIN HYDROCHLORIDE 0.4 MG/1
0.4 CAPSULE ORAL AT BEDTIME
Refills: 0 | Status: DISCONTINUED | OUTPATIENT
Start: 2019-10-16 | End: 2019-10-18

## 2019-10-16 RX ORDER — ACETAMINOPHEN 500 MG
650 TABLET ORAL EVERY 6 HOURS
Refills: 0 | Status: DISCONTINUED | OUTPATIENT
Start: 2019-10-16 | End: 2019-10-18

## 2019-10-16 RX ORDER — PANTOPRAZOLE SODIUM 20 MG/1
40 TABLET, DELAYED RELEASE ORAL DAILY
Refills: 0 | Status: DISCONTINUED | OUTPATIENT
Start: 2019-10-16 | End: 2019-10-18

## 2019-10-16 RX ORDER — IPRATROPIUM/ALBUTEROL SULFATE 18-103MCG
3 AEROSOL WITH ADAPTER (GRAM) INHALATION EVERY 6 HOURS
Refills: 0 | Status: DISCONTINUED | OUTPATIENT
Start: 2019-10-16 | End: 2019-10-18

## 2019-10-16 RX ADMIN — PIPERACILLIN AND TAZOBACTAM 200 GRAM(S): 4; .5 INJECTION, POWDER, LYOPHILIZED, FOR SOLUTION INTRAVENOUS at 02:25

## 2019-10-16 RX ADMIN — Medication 3 MILLILITER(S): at 01:05

## 2019-10-16 RX ADMIN — Medication 6 UNIT(S): at 18:18

## 2019-10-16 RX ADMIN — Medication 4: at 12:46

## 2019-10-16 RX ADMIN — Medication 40 MILLIGRAM(S): at 05:37

## 2019-10-16 RX ADMIN — Medication 3 MILLILITER(S): at 20:18

## 2019-10-16 RX ADMIN — Medication 81 MILLIGRAM(S): at 12:43

## 2019-10-16 RX ADMIN — CLOPIDOGREL BISULFATE 75 MILLIGRAM(S): 75 TABLET, FILM COATED ORAL at 12:43

## 2019-10-16 RX ADMIN — PIPERACILLIN AND TAZOBACTAM 25 GRAM(S): 4; .5 INJECTION, POWDER, LYOPHILIZED, FOR SOLUTION INTRAVENOUS at 18:18

## 2019-10-16 RX ADMIN — Medication 3 MILLILITER(S): at 07:21

## 2019-10-16 RX ADMIN — Medication 3 MILLILITER(S): at 01:50

## 2019-10-16 RX ADMIN — PIPERACILLIN AND TAZOBACTAM 25 GRAM(S): 4; .5 INJECTION, POWDER, LYOPHILIZED, FOR SOLUTION INTRAVENOUS at 12:20

## 2019-10-16 RX ADMIN — Medication 0.5 MILLIGRAM(S): at 20:18

## 2019-10-16 RX ADMIN — HEPARIN SODIUM 5000 UNIT(S): 5000 INJECTION INTRAVENOUS; SUBCUTANEOUS at 05:37

## 2019-10-16 RX ADMIN — Medication 40 MILLIGRAM(S): at 14:12

## 2019-10-16 RX ADMIN — Medication 125 MILLIGRAM(S): at 03:50

## 2019-10-16 RX ADMIN — Medication 6: at 17:36

## 2019-10-16 RX ADMIN — HEPARIN SODIUM 5000 UNIT(S): 5000 INJECTION INTRAVENOUS; SUBCUTANEOUS at 22:04

## 2019-10-16 RX ADMIN — HEPARIN SODIUM 5000 UNIT(S): 5000 INJECTION INTRAVENOUS; SUBCUTANEOUS at 14:12

## 2019-10-16 RX ADMIN — Medication 250 MILLIGRAM(S): at 05:51

## 2019-10-16 RX ADMIN — PIPERACILLIN AND TAZOBACTAM 3.38 GRAM(S): 4; .5 INJECTION, POWDER, LYOPHILIZED, FOR SOLUTION INTRAVENOUS at 03:52

## 2019-10-16 RX ADMIN — SODIUM CHLORIDE 1000 MILLILITER(S): 9 INJECTION INTRAMUSCULAR; INTRAVENOUS; SUBCUTANEOUS at 02:15

## 2019-10-16 RX ADMIN — PANTOPRAZOLE SODIUM 40 MILLIGRAM(S): 20 TABLET, DELAYED RELEASE ORAL at 12:41

## 2019-10-16 RX ADMIN — Medication 3 MILLILITER(S): at 14:09

## 2019-10-16 RX ADMIN — TAMSULOSIN HYDROCHLORIDE 0.4 MILLIGRAM(S): 0.4 CAPSULE ORAL at 22:33

## 2019-10-16 RX ADMIN — Medication 3 MILLILITER(S): at 01:59

## 2019-10-16 RX ADMIN — SODIUM CHLORIDE 100 MILLILITER(S): 9 INJECTION INTRAMUSCULAR; INTRAVENOUS; SUBCUTANEOUS at 05:37

## 2019-10-16 RX ADMIN — Medication 250 MILLIGRAM(S): at 17:07

## 2019-10-16 RX ADMIN — Medication 600 MILLIGRAM(S): at 17:07

## 2019-10-16 RX ADMIN — Medication 6: at 22:09

## 2019-10-16 RX ADMIN — Medication 2: at 05:37

## 2019-10-16 RX ADMIN — ATORVASTATIN CALCIUM 40 MILLIGRAM(S): 80 TABLET, FILM COATED ORAL at 22:04

## 2019-10-16 RX ADMIN — Medication 0.5 MILLIGRAM(S): at 07:21

## 2019-10-16 NOTE — ED PROVIDER NOTE - CLINICAL SUMMARY MEDICAL DECISION MAKING FREE TEXT BOX
79 year old male with COPD p/w worsening shortness of breath. Labs, CXR, BiPAP, abx as needed, EKG, admit

## 2019-10-16 NOTE — H&P ADULT - PROBLEM SELECTOR PLAN 3
Admitted for septic workup and evaluation,send blood and urine cx,serial lactate levels,monitor vitals closley,ivfs hydration,monitor urine output and renal profile,iv abx initiated -VANCO AND ZOSYN given in ER Suspected -Admitted for septic workup and evaluation,send blood and urine cx,serial lactate levels,monitor vitals closley,ivfs hydration,monitor urine output and renal profile,iv abx initiated -VANCO AND ZOSYN given in ER

## 2019-10-16 NOTE — ED ADULT NURSE NOTE - OBJECTIVE STATEMENT
Pt to ED c/c SOB, resps labored, noted with inter/supracostal retractions, tachypnea and tachycardic. Pt denies pain. RT called and responded, placed on BiPap 18/8 at 30%

## 2019-10-16 NOTE — H&P ADULT - HISTORY OF PRESENT ILLNESS
80 yo AAM with history of recent admission with copd exacerbation in telemetry unit  , patient has hx of  COPD (On home O2 2L and BIPAP at while sleeping regardless of time of day), CAD (w/ 3v CABG 2004),s/p 2 recent coronary stensts in [facility] , HLD, DM2 (on Metformin), BPH, hx Hypercapnic Respiratory  Failure/Cardiac Arrest requiring intubation (6/18) Denies smoking/drinking/drug use  Diagnosed with COPD EXACERBATION AND ACUTE  HYPERCAPNIC AND HYPOXIC RESPIRATORY FAILURE ,placed on BIPAP , Found to have SVT .Admit to critical care unit  ,SERIAL ABGS ,XRAYS OF CHEST , intravenous steroids nebulized bronchodilators and pulmonology evaluation,O2 supply, serial labs and chest xrays , send 3 sets of cardiac enzymes to rule out coronary event, obtain ECHO to evaluate LVEF, cardiology consult ,continue current management, O2 supply, anticoagulation plan as per cardiology consult Pneumonia suspected and vancomycin and zosyn were given in ER  ,found to have lactate elevation .Admitted for septic workup and evaluation,send blood and urine cx,serial lactate levels,monitor vitals closley,ivfs hydration,monitor urine output and renal profile,

## 2019-10-16 NOTE — ED ADULT NURSE REASSESSMENT NOTE - NS ED NURSE REASSESS COMMENT FT1
Patient states" I feel like my breathing is worse. " Patient oxygen is 95% on 30% FIO2 on BIpap. Patient tachycardic at 150. Blood pressure 173/77/ Dr. Leiva made aware. Respiratory at the bedside for ABg. Cardiac enzymes add on. EKG in progress. Pending Solumedrol and ICU evaluation. Hilary RAMOS
Pt had episode of narrow complex tachycardia, resolved spontaneously after approximately 5 minutes. ED physician and ICU PA at bedside. Pt had second episode just prior to transport, resolved after approximately 1 minute

## 2019-10-16 NOTE — PROGRESS NOTE ADULT - ATTENDING COMMENTS
79M h/o CAD s/p CABG x3 2004 with recent stent placement (8/2019), HTN, COPD (on 3L O2 and nocturnal bipap), osteomyelitis, DM, cardiac arrest 6/2018 a/w acute hypoxic/hypercarbic respiratory failure due to COPD exacerbation, transferred to ICU for worsening respiratory status on BiPAP and new AFib in setting of hypoxia/respiratory distress now rate controlled and with improved resp status.    Neuro: no acute issues  CV: now better rate controlled s/p Cardizem 20mg IV overnight, currently sinus tach to low 100s which is baseline per wife; pt on metroprolol 12.5mg po bid at home, can give IV while NPO on BiPAP, restart PO once stable off BiPAP  Pulm: COPD exacerbation requiring BiPAP, unclear trigger; pt reports runny nose and dry cough in days leading up to hospital presentation - will check RVP, covered emperically with vanc, zosyn however CXR looks clear; if cultures negative can dc; continue steroids and nebs  GI: NPO while on BiPAP  Renal: no acute issues  ID: emperically covered with vanc/zosyn pending cultures; RVP negative 79M h/o CAD s/p CABG x3 2004 with recent stent placement (8/2019), HTN, COPD (on 3L O2 and nocturnal bipap), osteomyelitis, DM, cardiac arrest 6/2018 a/w acute hypoxic/hypercarbic respiratory failure due to COPD exacerbation, transferred to ICU for worsening respiratory status on BiPAP and new AFib in setting of hypoxia/respiratory distress now rate controlled and with improved resp status.    Neuro: no acute issues  CV: now better rate controlled s/p Cardizem 20mg IV overnight, currently sinus tach to low 100s which is baseline per wife; pt on metroprolol 12.5mg po bid at home, can give IV while NPO on BiPAP, restart PO once stable off BiPAP  Pulm: COPD exacerbation requiring BiPAP, unclear trigger; pt reports runny nose and dry cough in days leading up to hospital presentation - will check RVP, covered emperically with vanc, zosyn however CXR looks clear and procal negative; if cultures negative can dc; continue steroids and nebs  GI: NPO while on BiPAP  Renal: no acute issues  ID: emperically covered with vanc/zosyn pending cultures; RVP pending; lactate elevation may 2/2 work of breathing and albuterol though will repeat  Endo: DM on PO meds at home, can cover with ISS, may require additional lantus while on steroids, will monitor FS

## 2019-10-16 NOTE — H&P ADULT - ASSESSMENT
78 yo AAM with history of recent admission with copd exacerbation in telemetry unit  , patient has hx of  COPD (On home O2 2L and BIPAP at while sleeping regardless of time of day), CAD (w/ 3v CABG 2004),s/p 2 recent coronary stensts in [facility] , HLD, DM2 (on Metformin), BPH, hx Hypercapnic Respiratory  Failure/Cardiac Arrest requiring intubation (6/18) Denies smoking/drinking/drug use  Diagnosed with COPD EXACERBATION AND ACUTE  HYPERCAPNIC AND HYPOXIC RESPIRATORY FAILURE ,placed on BIPAP , Found to have SVT .Admit to critical care unit  ,SERIAL ABGS ,XRAYS OF CHEST , intravenous steroids nebulized bronchodilators and pulmonology evaluation,O2 supply, serial labs and chest xrays , send 3 sets of cardiac enzymes to rule out coronary event, obtain ECHO to evaluate LVEF, cardiology consult ,continue current management, O2 supply, anticoagulation plan as per cardiology consult Pneumonia suspected and vancomycin and zosyn were given in ER  ,found to have lactate elevation .Admitted for septic workup and evaluation,send blood and urine cx,serial lactate levels,monitor vitals closley,ivfs hydration,monitor urine output and renal profile,

## 2019-10-16 NOTE — H&P ADULT - NSHPLABSRESULTS_GEN_ALL_CORE
< from: Xray Chest 1 View-PORTABLE IMMEDIATE (10.01.19 @ 09:16) >    < from: Xray Chest 1 View-PORTABLE IMMEDIATE (10.01.19 @ 09:16) >    EXAM:  XR CHEST PORTABLE IMMED 1V                            PROCEDURE DATE:  10/01/2019          INTERPRETATION:  Short of breath.    AP chest. Prior 9/18/2019.    Status post median sternotomy. No change heart mediastinum. Hyperinflated   emphysematous lungs with chronic accentuated bibasilar bronchovascular   markings similar to prior. No acute infiltrate pneumothorax or pleural   effusion.    Impression: COPD. No active infiltrates. Stable exam    < end of copied text >    < from: TTE Echo Doppler w/o Cont (03.12.19 @ 20:42) >    FINDINGS  Left Ventricle: The endocardium is not well-visualized. In limited views,   the LV function appears to be preserved with an estimated EF of 55%.   There is paradoxical motion of the septum in the setting of prior cardiac   surgery.  Aortic Valve: Calcified aortic valve with normal opening. Trace aortic   regurgitation  Mitral Valve: Thickened and calcified mitral valve leaflets with trace to   mild mitral regurgitation  Tricuspid Valve: Grossly normal tricuspid valve. Mild tricuspid   regurgitation.  Pulmonic Valve: Not well-visualized.  Left Atrium: Grossly normal. An interatrial septal aneurysm is noted   without obvious color flow across it  Right Ventricle: In limited views, grossly normal RV size.  Right Atrium: Grossly normal  Diastolic Function: Doppler evidence of rate 1 diastolic dysfunction  Pericardium/Pleura: No significant pericardial effusion.  Hemodynamics: The pulmonary artery systolic pressure is estimated to be   29 mmHg, assuming the right atrial pressure is equal to 8 mmHg,   consistent with normal pulmonary pressures.    CONCLUSIONS:  1. Technically difficult and limited study  2. The endocardium is not well-visualized. In limited views, the LV   function appears to be preserved with an estimated EF of 55%. There is   paradoxical motion of the septum in the setting of prior cardiac surgery.  3. Calcified aortic valve with normal opening. Trace aortic regurgitation  4. Thickened and calcified mitral valve leaflets with trace to mild   mitral regurgitation  5. Doppler evidence of rate 1 diastolic dysfunction  6. The interatrial septum appears aneurysmal  7. No significant pericardial effusion.    < end of copied text >        < end of copied text >

## 2019-10-16 NOTE — DIETITIAN INITIAL EVALUATION ADULT. - PROBLEM SELECTOR PLAN 8
FINAL REPORT



PROCEDURE:  US ABDOMEN LIMITED



TECHNIQUE:  Real-time sonography in multiple planes of the

gallbladder fossa and CBD with imaging of the adjacent liver,

pancreas, and right kidney was performed with image

documentation. CPT 53097







HISTORY:  acute RUQ paon 



COMPARISON:  No prior studies are available for comparison.



FINDINGS:  

Liver: Normal size and echotexture with no evidence of cystic or

solid mass lesion.



Gallbladder: Fluid filled. No gallstones, wall thickening,

pericholecystic fluid, or sonographic Davenport's sign .



Intrahepatic bile ducts: Normal . 



Extrahepatic bile ducts: Common bile duct measures 4 millimeters

in caliber.



Pancreas: Not fully visualized.



Right kidney: There is moderate right hydronephrosis.



Other: No free fluid.







IMPRESSION:  

No sonographic evidence of cholelithiasis or cholecystitis.



Moderate right hydronephrosis check lipid profile ,patient is on statin

## 2019-10-16 NOTE — CONSULT NOTE ADULT - ASSESSMENT
PATIENT  COMMODALAN CAO 10/16/2019  PULMONARY/CRITICAL CARE ASSESSMENT/RECOMMENDATIONS                    AC COMBINED HYPERCAPNIC HYPOXEMIC RESP FAILURE   Suggest permissive hypercapnia strategy to avoid barotrauma   Monitor abgs   POSSIBLE RESP TRACT INFECTION   Suggest check procalc to help deescalaation decisions   LACTICEMIA   Likely due to wob rather than sepsis   Agree with iv fluids Monitor serially   COPD ex   On bd steroids   CAD  Doubt onoin isch Is on dapt   and  bb                     TIME SPENT Over 55 minutes aggregate care time spent on encounter; activities included   direct pt care, counseling and/or coordinating care reviewing notes, lab data/ imaging , discussion with multidisciplinary team/ pt /family. Risks, benefits, alternatives  discussed in detail.

## 2019-10-16 NOTE — CONSULT NOTE ADULT - SUBJECTIVE AND OBJECTIVE BOX
[facility] Consultation    CHIEF COMPLAINT: Patient is a 79y old  Male who presents with a chief complaint of SOB (16 Oct 2019 03:48)      HPI:  80 yo AAM with history of recent admission with copd exacerbation in telemetry unit  , patient has hx of  COPD (On home O2 2L and BIPAP at while sleeping regardless of time of day), CAD (w/ 3v CABG 2004),s/p 2 recent coronary stensts in [facility] , HLD, DM2 (on Metformin), BPH, hx Hypercapnic Respiratory  Failure/Cardiac Arrest requiring intubation (6/18) Denies smoking/drinking/drug use  Diagnosed with COPD EXACERBATION AND ACUTE  HYPERCAPNIC AND HYPOXIC RESPIRATORY FAILURE ,placed on BIPAP , Found to have SVT .Admit to critical care unit  ,SERIAL ABGS ,XRAYS OF CHEST , intravenous steroids nebulized bronchodilators and pulmonology evaluation,O2 supply, serial labs and chest xrays , send 3 sets of cardiac enzymes to rule out coronary event, obtain ECHO to evaluate LVEF, cardiology consult ,continue current management, O2 supply, anticoagulation plan as per cardiology consult Pneumonia suspected and vancomycin and zosyn were given in ER  ,found to have lactate elevation .Admitted for septic workup and evaluation,send blood and urine cx,serial lactate levels,monitor vitals closley,ivfs hydration,monitor urine output and renal profile, (16 Oct 2019 03:48)    He is currently awake and alert and did well on BiPAP overnight. He reports no worsening dyspnea. He has no chest pain or palpitations. His wife is in attendance.    PAST MEDICAL & SURGICAL HISTORY:  PVD (peripheral vascular disease)  Hypertension  COPD (chronic obstructive pulmonary disease): on 2L at home and BiPAP at night; intubated 6/18  Osteomyelitis  Dyslipidemia  CAD (Coronary Artery Disease): s/p 3v CABG 2004  Diabetes Mellitus, Type II  S/P primary angioplasty with coronary stent  Compound fracture: left leg  CABG (Coronary Artery Bypass Graft): 2004      SOCIAL HISTORY: remote tobacco, no etoh    FAMILY HISTORY:   Family history of diabetes mellitus (Sibling)      MEDICATIONS  (STANDING):  ALBUTerol/ipratropium for Nebulization 3 milliLiter(s) Nebulizer every 6 hours  aspirin enteric coated 81 milliGRAM(s) Oral daily  atorvastatin 40 milliGRAM(s) Oral at bedtime  buDESOnide    Inhalation Suspension 0.5 milliGRAM(s) Inhalation two times a day  clopidogrel Tablet 75 milliGRAM(s) Oral daily  dextrose 5%. 1000 milliLiter(s) (50 mL/Hr) IV Continuous <Continuous>  dextrose 50% Injectable 12.5 Gram(s) IV Push once  dextrose 50% Injectable 25 Gram(s) IV Push once  dextrose 50% Injectable 25 Gram(s) IV Push once  guaiFENesin  milliGRAM(s) Oral every 12 hours  heparin  Injectable 5000 Unit(s) SubCutaneous every 8 hours  influenza   Vaccine 0.5 milliLiter(s) IntraMuscular once  insulin lispro (HumaLOG) corrective regimen sliding scale   SubCutaneous every 6 hours  methylPREDNISolone sodium succinate Injectable 40 milliGRAM(s) IV Push every 8 hours  metoprolol tartrate 25 milliGRAM(s) Oral daily  pantoprazole  Injectable 40 milliGRAM(s) IV Push daily  piperacillin/tazobactam IVPB.. 3.375 Gram(s) IV Intermittent every 8 hours  sodium chloride 0.9%. 1000 milliLiter(s) (100 mL/Hr) IV Continuous <Continuous>  tamsulosin 0.4 milliGRAM(s) Oral at bedtime  vancomycin  IVPB 1000 milliGRAM(s) IV Intermittent every 12 hours    MEDICATIONS  (PRN):  acetaminophen   Tablet .. 650 milliGRAM(s) Oral every 6 hours PRN Temp greater or equal to 38C (100.4F), Mild Pain (1 - 3)  dextrose 40% Gel 15 Gram(s) Oral once PRN Blood Glucose LESS THAN 70 milliGRAM(s)/deciliter  glucagon  Injectable 1 milliGRAM(s) IntraMuscular once PRN Glucose LESS THAN 70 milligrams/deciliter      Allergies    No Known Allergies    Intolerances    shellfish (Nausea)      REVIEW OF SYSTEMS:    CONSTITUTIONAL: pos weakness, no chills  EYES: No visual changes, No diplopia  ENMT: No throat pain , No exudate  NECK: No pain or stiffness  RESPIRATORY:  shortness of breath above  CARDIOVASCULAR: No chest pain or palpitations  GASTROINTESTINAL: No abdominal pain. No nausea, vomiting, or hematemesis; No diarrhea or constipation. No melena or hematochezia.  GENITOURINARY: No dysuria, frequency or hematuria  NEUROLOGICAL: No numbness or weakness  SKIN: No itching or rash  All other review of systems is negative unless indicated above    VITAL SIGNS:   Vital Signs Last 24 Hrs  T(C): 36.4 (16 Oct 2019 07:57), Max: 36.8 (16 Oct 2019 04:35)  T(F): 97.6 (16 Oct 2019 07:57), Max: 98.2 (16 Oct 2019 04:35)  HR: 101 (16 Oct 2019 09:00) (101 - 150)  BP: 112/58 (16 Oct 2019 09:00) (108/61 - 177/90)  BP(mean): 79 (16 Oct 2019 09:00) (79 - 93)  RR: 17 (16 Oct 2019 09:00) (17 - 32)  SpO2: 100% (16 Oct 2019 09:00) (78% - 100%)    I&O's Summary    15 Oct 2019 07:01  -  16 Oct 2019 07:00  --------------------------------------------------------  IN: 550 mL / OUT: 0 mL / NET: 550 mL    16 Oct 2019 07:01  -  16 Oct 2019 09:35  --------------------------------------------------------  IN: 200 mL / OUT: 0 mL / NET: 200 mL        PHYSICAL EXAM:    Constitutional: awake and alert, well-developed  Eyes: ,  Pupils round, no lesions  ENMT: no exudate or erythema  Pulmonary: breath sounds are clear bilaterally, No wheezing, rales or rhonchi  Cardiovascular: PMI not palpable Regular S1 and S2, no murmurs, rubs, gallops or clicks  Gastrointestinal: Bowel Sounds present, soft, nontender.   Lymph: trc peripheral edema. No cervical lymphadenopathy.  Neurological: Alert, no focal deficits  Skin: No rashes.  No cyanosis.  Psych:  Mood & affect appropriate    LABS: All Labs Reviewed:                        12.4   13.73 )-----------( 317      ( 16 Oct 2019 01:35 )             40.5     16 Oct 2019 01:35    142    |  104    |  15     ----------------------------<  153    4.3     |  30     |  1.20     Ca    9.9        16 Oct 2019 01:35    TPro  7.2    /  Alb  4.0    /  TBili  0.4    /  DBili  x      /  AST  23     /  ALT  36     /  AlkPhos  90     16 Oct 2019 01:35      CARDIAC MARKERS ( 16 Oct 2019 08:23 )  .021 ng/mL / x     / x     / x     / x      CARDIAC MARKERS ( 16 Oct 2019 01:35 )  <.015 ng/mL / x     / 87 U/L / x     / 3.9 ng/mL        10-16 @ 01:35  Pro Bnp 115      < from: 12 Lead ECG (10.01.19 @ 12:38) >    Ventricular Rate 106 BPM    Atrial Rate 106 BPM    P-R Interval 138 ms    QRS Duration 82 ms    Q-T Interval 324 ms    QTC Calculation(Bezet) 430 ms    P Axis 73 degrees    R Axis 81 degrees    T Axis 60 degrees    Diagnosis Line Sinus tachycardia  Low voltage QRS  Confirmed by MICHAEL BASS ([id number]) on 10/1/2019 12:59:42 PM    < end of copied text >    < from: Xray Chest 1 View AP/PA (10.16.19 @ 01:57) >    EXAM:  XR CHEST AP OR PA 1V                            PROCEDURE DATE:  10/16/2019          INTERPRETATION:  Clinical information: Shortness of breath.    Technique: Frontal view of the chest.    Comparison: Prior chest x-ray examination from 10/1/2019.    Findings: The patient is status post median sternotomy.    Small layering bilateral pleural effusions are noted. The   cardiomediastinal silhouette is normal. There are mild multilevel   degenerative changes of the thoracic spine.    IMPRESSION: Small bilateral pleural effusions.                LANE SALOMON M.D., ATTENDING RADIOLOGIST  This document has been electronically signed. Oct 16 2019  8:25AM                < end of copied text >    < from: TTE Echo Doppler w/o Cont (03.12.19 @ 20:42) >     EXAM:  ECHO TTE WO CON COMP W DOPPLR         PROCEDURE DATE:  03/12/2019        INTERPRETATION:  Ordering Physician: MARTÍN CARRASQUILLO [id number]    Indication: Shortness of breath    Study Quality: Technically difficult   A complete echocardiographic study was performed utilizing standard   protocol including spectral and color Doppler in all echocardiographic   windows.    Height: 175 cm  Weight: 113 kg  BSA: 2.2 sq m  Blood Pressure: 103/61    MEASUREMENTS  IVS: 1.5cm  PWT: 1.1cm  LA: 3.1cm  AO: 3.3cm  LVIDd: 4.2cm  LVIDs: 3.3cm    RVSP: 29mmHg    FINDINGS  Left Ventricle: The endocardium is not well-visualized. In limited views,   the LV function appears to be preserved with an estimated EF of 55%.   There is paradoxical motion of the septum in the setting of prior cardiac   surgery.  Aortic Valve: Calcified aortic valve with normal opening. Trace aortic   regurgitation  Mitral Valve: Thickened and calcified mitral valve leaflets with trace to   mild mitral regurgitation  Tricuspid Valve: Grossly normal tricuspid valve. Mild tricuspid   regurgitation.  Pulmonic Valve: Not well-visualized.  Left Atrium: Grossly normal. An interatrial septal aneurysm is noted   without obvious color flow across it  Right Ventricle: In limited views, grossly normal RV size.  Right Atrium: Grossly normal  Diastolic Function: Doppler evidence of rate 1 diastolic dysfunction  Pericardium/Pleura: No significant pericardial effusion.  Hemodynamics: The pulmonary artery systolic pressure is estimated to be   29 mmHg, assuming the right atrial pressure is equal to 8 mmHg,   consistent with normal pulmonary pressures.    CONCLUSIONS:  1. Technically difficult and limited study  2. The endocardium is not well-visualized. In limited views, the LV   function appears to be preserved with an estimated EF of 55%. There is   paradoxical motion of the septum in the setting of prior cardiac surgery.  3. Calcified aortic valve with normal opening. Trace aortic regurgitation  4. Thickened and calcified mitral valve leaflets with trace to mild   mitral regurgitation  5. Doppler evidence of rate 1 diastolic dysfunction  6. The interatrial septum appears aneurysmal  7. No significant pericardial effusion.                      REJI VILA   This document has been electronically signed. Mar 14 2019 10:16AM                < end of copied text >

## 2019-10-16 NOTE — CONSULT NOTE ADULT - SUBJECTIVE AND OBJECTIVE BOX
COMMODORE TURNER [facility] P [phone]  1940 DOA 10/16/2019 DR ISREAL MAR  ALLERGY Shellfish  CONTACT Sp Amira EATON      Initial evaluation/Pulmonary Critical Care consultation requested on  10/16/2019  by Dr Mar  from Dr Dejesus   Patient examined chart reviewed    HOSPITAL ADMISSION   PATIENT CAME  FROM (if information available)      PATIENT COMMODORE TURNER [facility] P [phone]  1940 DOA 10/16/2019 DR ISREAL MAR                        PT DESCRIPTION     This section was excerpted from ER md note but was independently verified by me    · Chief Complaint: The patient is a 79y Male complaining of difficulty breathing.  · HPI Objective Statement: 79 year old male with a history of COPD, DM, HLD, HTN, PVD, CAD with CABG presents with SOB/COPD exacerbation.  Patient with multiple prior ED visits for same, was last admitted - for COPD.  Wife states patient started to become short of breath yesterday but was reluctant to come to the hospital.  It worsened today despite 4 nebs in 24/hrs and being placed on BiPAP at home.  He is on 2 Liters home oxygen therapy at all times as well.  Patient c/o dyspnea and chest tightness.  History of prior intubation in 2018.  Wife denies fever, states he has a dry cough.  PMD Sarah Avila Pulbriseyda Dejesus  · Presenting Symptoms: DIFFICULTY BREATHING, DYSPNEA ON EXERTION, SHORTNESS OF BREATH  · Negative Findings: no body aches, no chills, no edema, no fever, no headache  · Timing: gradual onset  · Duration: yesterday  · Quality: non-productive cough, tightness  · Severity: PAIN SCALE 8 OF 10.  · Context: unknown  · Recent Exposure To: none known  · Aggravated Factors: walking  · Relieving Factors: none    PAST MEDICAL/SURGICAL/FAMILY/SOCIAL HISTORY:    Past Medical History:  CAD (Coronary Artery Disease)  s/p 3v CABG   COPD (chronic obstructive pulmonary disease)  on 2L at home and BiPAP at night; intubated   Diabetes Mellitus, Type II    Dyslipidemia    Hypertension    Osteomyelitis    PVD (peripheral vascular disease).     Past Surgical History:  CABG (Coronary Artery Bypass Graft)    Compound fracture  left leg  S/P primary angioplasty with coronary stent.     Tobacco Usage:  · Tobacco Usage Former smoker  · Longest Period Tobacco-Free Quit 30 years ago    ALLERGIES AND HOME MEDICATIONS:   Allergies:        Allergies:  No Known Allergies:        Intolerances:  shellfish: Food, Nausea, feels nauseous when eating crabs or shrimp but no true allergic reaction    Home Medications:   * Patient Currently Takes Medications as of 01-Oct-2019 16:18 documented in Structured Notes  · predniSONE 10 mg oral tablet: 1 tab(s) orally once a day MDD:TAKE PREDNISONE AS DIRECTED  · predniSONE 10 mg oral tablet: 3 tab(s) orally once a day x 2 days  2 tab(s) orally once a day x 2 days  1 tab(s) orally once a day x 2 days  · guaiFENesin 600 mg oral tablet, extended release: 1 tab(s) orally every 12 hours  · pantoprazole 40 mg oral delayed release tablet: 1 tab(s) orally once a day (before a meal)  · azithromycin 500 mg oral tablet: 1 tab(s) orally once a day  · acetaminophen 325 mg oral tablet: 2 tab(s) orally every 6 hours, As needed, Temp greater or equal to 38C (100.4F), Mild Pain (1 - 3)  · aspirin 81 mg oral delayed release tablet: 1 tab(s) orally once a day  · ipratropium-albuterol 0.5 mg-2.5 mg/3 mLinhalation solution: 3 milliliter(s) inhaled every 6 hours, As Needed -for shortness of breath and/or wheezing   · valsartan 80 mg oral tablet: 1 tab(s) orally once a day  · clopidogrel 75 mg oral tablet: 1 tab(s) orally once a day  · atorvastatin 40 mg oral tablet: 1 tab(s) orally once a day  · tamsulosin 0.4 mg oral capsule: 1 cap(s) orally once a day (at bedtime)  · Breo Ellipta 100 mcg-25 mcg/inh inhalation powder: 1 puff(s) inhaled once a day  · metFORMIN 1000 mg oral tablet: 1 tab(s) orally 2 times a day  · Incruse Ellipta 62.5 mcg/inh inhalation powder: 1 puff(s) inhaled once a day  · glipiZIDE 2.5 mg oral tablet, extended release: 1 tab(s) orally 2 times a day  · Metoprolol Tartrate 25 mg oral tablet: 1 tablet orally once a day, hold for sbp below 100 or hr below 60    REVIEW OF SYSTEMS:    Review of Systems:  · ROS STATEMENT: all other ROS negative except as per HPI    VITAL SIGNS( [place]):    ,,ED ADULT Flow Sheet:    16-Oct-2019 01:01  · Heart Rate Heart Rate (beats/min): 133  · BP Systolic Systolic: 177  · BP Diastolic Diastolic (mm Hg): 90  · Respiration Rate (breaths/min) Respiration Rate (breaths/min): 30  · SpO2 (%) SpO2 (%): 78    03:00  · Heart Rate Heart Rate (beats/min): 150  · BP Systolic Systolic: 173  · BP Diastolic Diastolic (mm Hg): 77  · Respiration Rate (breaths/min) Respiration Rate (breaths/min): 26  · SpO2 (%) SpO2 (%): 95  · Oxygen Concentration (%): 30    PHYSICAL EXAM:   · CONSTITUTIONAL: Well appearing, well nourished, awake, alert, oriented to person, place, time/situation and patient in respiratory distress  · EYES: Clear bilaterally, pupils equal, round and reactive to light.  · CARDIAC: , regular rhythm.  Heart sounds S1, S2.  No murmurs, rubs or gallops.  · RESPIRATORY: Decreased breath sounds b/l with expiratory wheezing.  patient speaking in short sentences on BiPAP, tachypneic to 25  · GASTROINTESTINAL: Abdomen soft, non-tender, no guarding.  · MUSCULOSKELETAL: Spine appears normal, range of motion is not limited, no muscle or joint tenderness  · NEUROLOGICAL: Alert and oriented, no focal deficits, no motor or sensory deficits.  · SKIN: Skin normal color for race, warm, dry and intact. No evidence of rash.                PATIENT COMMODORE YUE   VITALS/LABS         10/16/2019 101 112/58 17 100%   10/16/2019 24/6/.4   10/16/2019 W 13.7 Hb 12.4 Plt 317 Na 142 K 4.3 CO2 30 Cr 1.2   10/16/2019 LA 2.7  10/16/2019 Tr 1 n.2   10/16/2019 cxr sm bl effs          PATIENT COMMODORE YUE DOA 10/16/2019  PATIENT DATA   ALLERGY shellfish  WT  93             BMI    28                                                                                                                                      HEAD OF BED ELEVATION Yes   DVT PROPHYLAXIS hpsc (10/16                                 DIET     npo (10/16)                                                            RESPIRATORY GAS EXCHANGE     10/16/2019 4a 248/.6 723/73/296  10/16/2019 3a 18/.3 720/75/55                                                            HEMODYNAMICS        10/16/2019 112/50                                                 IV FLUIDS   ns 100 (10/16)                                                MICROBIO      ANTIBIOTICS   zosyn (10/16)  vanco 1.2 (10/16)    INDWELLING TUBES         PATIENT COMMODORE YUE CAO 10/16/2019  PATIENT DATA   AC COMBINED HYPERCAPNIC HYPOXEMIC RESP FAILURE   10/16/2019 4a 24/8/.6 723/73/296  10/16/2019 3a 18/8/.3 720/75/55     POSSIBLE RESP TRACT INFECTION   10/16/2019 W 13.7   10/16/2019 cxr sm bl effs   ABIO   zosyn (10/16)  vanco 1.2 (10/16)   LACTICEMIA   10/16/2019 LA         PATIENT COMMODORE YUE CAO 10/16/2019  PATIENT DATA   COPD ex   duoneb.4 (10/16)   pulmicort (10/16)   solumed 40.3 (10/16)   CAD  10/16/2019 Tr 1 n.2   ASA 81 (10/16)   atorvastat 40 (10/16)   plavix 75 (10/16)   metoprolol 25 (10/16)   DM  iss (10/16)   SUP  protoni 40 (10/16)       PATIENT   COMMODORE YUE CAO  10/16/2019   Pt description                          cc SOB HO 2 stents placed at [facility] 2019 presented with incr sob No cp No fever      HPI PMH   79 m former smoker PMH HTN, DM2 (on home Metformin and glipizide), COPD (2L home/ BIPAP at night), CAD with 3v CABG , Stent 2019 HLD, 10/26/2018 ECHO ef 55% hx hypercapnic resp failure with ho intubation c/b with cardiac arrest (2018) with ho recurrent admissions GOLD D recent admission  -2019 with copd ex ac on chr hypercapnic resp failure  was evaluated and DCed  ER 10/1/2019     Pt admitted 10/16/2019 with COPD ex resp failure Pt symptomatic x 1 d had dental cleaning 10/15 a No travel No sick contacts     er vitals 177/90 133 78%

## 2019-10-16 NOTE — DIETITIAN INITIAL EVALUATION ADULT. - PHYSICAL APPEARANCE
well nourished/other (specify)/BMI 28.8 using reported usual of 207#. Wt today noted to be 213#.  No pressure injuries, no edema noted.

## 2019-10-16 NOTE — ED PROVIDER NOTE - PROGRESS NOTE DETAILS
Patient complaining of worsening SOB.  States symptoms initially improved on BiPAP and now have worsened again.  .  Will get repeat EKG, ABG, ICU consult  with tachypneic.  Patient with mental status appropriate.  EKG with a-fib, RVR.  Adenosine drawn but not given as patient's HR declined back to 150 on its own.  20mg cardizem pushed.

## 2019-10-16 NOTE — H&P ADULT - PROBLEM SELECTOR PLAN 2
Admit for antibacterial managmnet ,intravenous steroids,nebulised bronchodilators and pulmonology evaluation,O2 supply,serial labs and chest xrays,obtain ECHO to evaluate LVEF and rule out chf component

## 2019-10-16 NOTE — CONSULT NOTE ADULT - ASSESSMENT
79M with PMHx as above, admitted to ICU with acute hypercapnic and hypoxemic respiratory failure, acute copd exacerbation, SVT    -Possible anxiety component of ST. May benefit form low dose of benzodiazapine for anxiolysis  -Episode of SVT to 200 in ED, now HR down to 130 ST. Will give Adenosine if further SVT episode.   -Initial set of CE's negative. Will continue to trend. EKG with SVT  -Cardiology consult  -Increased bipap settings to 22/8 60% for acute hypercapnic and hypoxemic respiratory failure. Will repeat ABG in 1-2 hours. Pt with prior history of hypercapnic driven cardiac arrest. Low threshold for intubation  -Continue steroids/nebs/empiric abx  -Keep HOB elevated 30 degrees  -NPO for now. PPI  -Will continue empiric abx for now. Monitor wbc/temp. F/U cultures  -DVT ppx  -Pt critically ill

## 2019-10-16 NOTE — ED ADULT NURSE NOTE - NSIMPLEMENTINTERV_GEN_ALL_ED
Implemented All Fall Risk Interventions:  [place] to call system. Call bell, personal items and telephone within reach. Instruct patient to call for assistance. Room bathroom lighting operational. Non-slip footwear when patient is off stretcher. Physically safe environment: no spills, clutter or unnecessary equipment. Stretcher in lowest position, wheels locked, appropriate side rails in place. Provide visual cue, wrist band, yellow gown, etc. Monitor gait and stability. Monitor for mental status changes and reorient to person, place, and time. Review medications for side effects contributing to fall risk. Reinforce activity limits and safety measures with patient and family.

## 2019-10-16 NOTE — DIETITIAN INITIAL EVALUATION ADULT. - OTHER INFO
As per chart, pt is a 79 yr old male admitted to ICU with acute hypercapnic and hypoxemic respiratory failure, acute copd exacerbation, SVT.  PMH includes CAD s/p cabg x3 2004, recent stent placement, htn, endstage copd (on 2 liters O2 and nocturnal bipap), osteomyelitis, dm, cardiac arrest 6/18.  Pt currently sound asleep with bipap mask in use.  NFPE deferred at this time. Pt appears well nourished.  Charts from prior admissions reviewed- no wt loss apparent, follows CC, low salt /fat diet at home.

## 2019-10-16 NOTE — ED PROVIDER NOTE - RESPIRATORY, MLM
Decreased breath sounds b/l with expiratory wheezing.  patient speaking in short sentences on BiPAP, tachypneic to 25

## 2019-10-16 NOTE — ED PROVIDER NOTE - CARE PLAN
Principal Discharge DX:	Atrial fibrillation, new onset  Secondary Diagnosis:	COPD exacerbation  Secondary Diagnosis:	Pneumonia
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
## 2019-10-16 NOTE — ED PROVIDER NOTE - CONSTITUTIONAL, MLM
normal... Well appearing, well nourished, awake, alert, oriented to person, place, time/situation and patient in respiratory distress

## 2019-10-16 NOTE — ED PROVIDER NOTE - OBJECTIVE STATEMENT
79 year old male with a history of COPD, DM, HLD, HTN, PVD, CAD with CABG presents with SOB/COPD exacerbation.  Patient with multiple prior ED visits for same, was last admitted 9/18-9/20 for COPD.  Wife states patient started to become short of breath yesterday but was reluctant to come to the hospital.  It worsened today despite 4 nebs in 24/hrs and being placed on BiPAP at home.  He is on 2 Liters home oxygen therapy at all times as well.  Patient c/o dyspnea and chest tightness.  History of prior intubation in 2018.  Wife denies fever, states he has a dry cough.  PMD Sarah Avila, Pulm Dr. Dejesus

## 2019-10-16 NOTE — CONSULT NOTE ADULT - ASSESSMENT
Patient appears somewhat clinically improved overnight with his primary decompensation apparently pulmonary process. He has no evidence for an acute coronary syndrome or decompensated heart failure. His normal pro BNP level is reassuring. Review of his telemetry strips reveals an episode of supraventricular tachycardia overnight, likely paroxysmal atrial fibrillation with episodes of aberrant conduction. He has known normal left ventricular function by recent echocardiography so should be an excellent prognostic group. He is hemodynamically stable at present    Recommend    Continue BiPAP    Continue pulmonary followup    Antibiotics per ICU team    DVT prophylaxis    can repeat echocardiography    No other cardiac evaluation is required at this time and further management can be dependent upon his clinical course Patient appears somewhat clinically improved overnight with his primary decompensation apparently pulmonary process. He has no evidence for an acute coronary syndrome or decompensated heart failure. His normal pro BNP level is reassuring. Review of his telemetry strips reveals an episode of supraventricular tachycardia overnight, likely paroxysmal atrial fibrillation with episodes of aberrant conduction. He has known normal left ventricular function by recent echocardiography so should be an excellent prognostic group. He is hemodynamically stable at present    Recommend    Continue BiPAP    Continue pulmonary followup    Antibiotics per ICU team    DVT prophylaxis    Cont ASA, clopidogrel, Statin    can repeat echocardiography    No other cardiac evaluation is required at this time and further management can be dependent upon his clinical course

## 2019-10-16 NOTE — H&P ADULT - ATTENDING COMMENTS
78 yo AAM with history of recent admission with copd exacerbation in telemetry unit  , patient has hx of  COPD (On home O2 2L and BIPAP at while sleeping regardless of time of day), CAD (w/ 3v CABG 2004),s/p 2 recent coronary stensts in [facility] , HLD, DM2 (on Metformin), BPH, hx Hypercapnic Respiratory  Failure/Cardiac Arrest requiring intubation (6/18) Denies smoking/drinking/drug use  Diagnosed with COPD EXACERBATION AND ACUTE  HYPERCAPNIC AND HYPOXIC RESPIRATORY FAILURE ,placed on BIPAP , Found to have SVT .Admit to critical care unit  ,SERIAL ABGS ,XRAYS OF CHEST , intravenous steroids nebulized bronchodilators and pulmonology evaluation,O2 supply, serial labs and chest xrays , send 3 sets of cardiac enzymes to rule out coronary event, obtain ECHO to evaluate LVEF, cardiology consult ,continue current management, O2 supply, anticoagulation plan as per cardiology consult Pneumonia suspected and vancomycin and zosyn were given in ER  ,found to have lactate elevation .Admitted for septic workup and evaluation,send blood and urine cx,serial lactate levels,monitor vitals closley,ivfs hydration,monitor urine output and renal profile,

## 2019-10-16 NOTE — PROGRESS NOTE ADULT - ASSESSMENT
Patient is a 79/M with PMHx significant for HTN, COPD (On home O2 2L and BIPAP at night and prn, CAD w/ stents, CABG, HLD, DM2 (not on insulin), hx Hypercapnic Resp Failure/Cardiac Arrest requiring intubation (2018), osteomyelitis, presented with acute SOB. Patient was admitted with acute respiratory failure likely due to COPD exacerbation. ICU consulted for acute hypercapnic hypoxemic respiratory failure likely from acute COPD exacerbation and SVT.     Neuro: stable, tylenol prn for pain    Cardio: HR improved, mildly tachycardic likely due to duoneb, respiratory distress. CE negative. Continue lopressor, lipitor, asa, plavix. Cardio consulted Dr. Dani Kessler; BIPAP setting increased overnight, currently saturating well. ABG improved. Will attempt to wean off bipap onto NC if able to tolerate. f/u on RVP. Continue duoneb Q6H, pulmicort BID, mucinex, solumedrol Q8H    GI: NPO while on bipap, continue protonix    : stable, continue flomax, continue IVF hydration while NPO    ID: elevated lactate likely due to acute illness and stress, mild leukocytosis likely due to steroid use. Afebrile, procalc neg. Continue vanco and zosyn empirically, f/u on RVP and blood cx.     Endo: f/u HgA1c, continue ISS, routine accuchecks    Heme: heparin subQ for dvt ppx, continue asa, plavix Patient is a 79/M with PMHx significant for HTN, COPD (On home O2 2L and BIPAP at night and prn, CAD w/ stents (most recent 8/2019 at [place]), CABG, HLD, DM2 (not on insulin), hx Hypercapnic Resp Failure/Cardiac Arrest requiring intubation (2018), osteomyelitis, presented with acute SOB. Patient was admitted with acute respiratory failure likely due to COPD exacerbation. ICU consulted for acute hypercapnic hypoxemic respiratory failure likely from acute COPD exacerbation and SVT.     Neuro: stable, tylenol prn for pain    Cardio: HR improved, mildly tachycardic likely due to duoneb, respiratory distress. CE negative. Continue lopressor, lipitor, asa, plavix. Cardio consulted Dr. Dani Kessler; BIPAP setting increased overnight, currently saturating well. ABG improved. Will attempt to wean off bipap onto NC if able to tolerate. f/u on RVP. Continue duoneb Q6H, pulmicort BID, mucinex, solumedrol Q8H    GI: NPO while on bipap, continue protonix    : stable, continue flomax, continue IVF hydration while NPO    ID: elevated lactate likely due to acute illness and stress, mild leukocytosis likely due to steroid use. Afebrile, procalc neg. Continue vanco and zosyn empirically, f/u on RVP and blood cx.     Endo: f/u HgA1c, continue ISS, routine accuchecks    Heme: heparin subQ for dvt ppx, continue asa, plavix

## 2019-10-16 NOTE — DIETITIAN INITIAL EVALUATION ADULT. - PROBLEM SELECTOR PLAN 3
Suspected -Admitted for septic workup and evaluation,send blood and urine cx,serial lactate levels,monitor vitals closley,ivfs hydration,monitor urine output and renal profile,iv abx initiated -VANCO AND ZOSYN given in ER

## 2019-10-16 NOTE — CONSULT NOTE ADULT - SUBJECTIVE AND OBJECTIVE BOX
Patient is a 79y old  Male who presents with a chief complaint of   24 hour events: Pt seen and examined at bedside. Chart/labs reviewed.   PAST MEDICAL & SURGICAL HISTORY:  PVD (peripheral vascular disease)  Hypertension  COPD (chronic obstructive pulmonary disease): on 2L at home and BiPAP at night; intubated 6/18  Osteomyelitis  Dyslipidemia  CAD (Coronary Artery Disease): s/p 3v CABG 2004  Diabetes Mellitus, Type II  S/P primary angioplasty with coronary stent  Compound fracture: left leg  CABG (Coronary Artery Bypass Graft): 2004      Review of Systems:  Constitutional: No fever, chills, fatigue  Neuro: No headache, numbness, weakness  Resp: No cough, wheezing, shortness of breath  CVS: No chest pain, palpitations, leg swelling  GI: No abdominal pain, nausea, vomiting, diarrhea   : No dysuria, frequency, incontinence  Skin: No itching, burning, rashes, or lesions   Msk: No joint pain or swelling  Psych: No depression, anxiety, mood swings    T(F): --  HR: 150 (10-16-19 @ 03:00) (133 - 150)  BP: 173/77 (10-16-19 @ 03:00) (173/77 - 177/90)  RR: 26 (10-16-19 @ 03:00) (26 - 30)  SpO2: 95% (10-16-19 @ 03:00) (78% - 95%)  Wt(kg): --        CAPILLARY BLOOD GLUCOSE          I&O's Summary      Physical Exam:     Gen:   Neuro: A&Ox3, non-focal  HEENT: NC/AT  Resp: CTA b/l  CVS: nl S1/S2, RRR  Abd: soft, nt, nd, +bs  Ext: no edema, +pulses  Skin: well perfused, warm    Meds:  vancomycin  IVPB IV Intermittent      methylPREDNISolone sodium succinate Injectable IV Push                                                12.4   13.73 )-----------( 317      ( 16 Oct 2019 01:35 )             40.5       10-16    142  |  104  |  15  ----------------------------<  153<H>  4.3   |  30  |  1.20    Ca    9.9      16 Oct 2019 01:35    TPro  7.2  /  Alb  4.0  /  TBili  0.4  /  DBili  x   /  AST  23  /  ALT  36  /  AlkPhos  90  10-16    Lactate 2.7           10-16 @ 01:35      CARDIAC MARKERS ( 16 Oct 2019 01:35 )  <.015 ng/mL / x     / 87 U/L / x     / 3.9 ng/mL                  CXR:    CTH:    CT Chest:    CT A/P:    TTE:        CENTRAL LINE: yes/no    SHARMA: yes/no    A-LINE: yes/no    GLOBAL ISSUE/BEST PRACTICE:  Analgesia:  Sedation:  HOB elevation: yes  Stress ulcer prophylaxis:  VTE prophylaxis:  Glycemic control:  Nutrition:      CODE STATUS: ***  GOC discussion: Y  Critical care time spent (mins): ***  (Reviewing data, imaging, discussing with multidisciplinary team, non inclusive of procedures, discussing goals of care with patient/family) In Brief:  79M with PMHx of cad s/p cabg x3 2004, recent stent placement, htn, endstage copd (on 2 liters O2 and nocturnal bipap  24 hour events: Pt seen and examined at bedside. Chart/labs reviewed.   PAST MEDICAL & SURGICAL HISTORY:  PVD (peripheral vascular disease)  Hypertension  COPD (chronic obstructive pulmonary disease): on 2L at home and BiPAP at night; intubated 6/18  Osteomyelitis  Dyslipidemia  CAD (Coronary Artery Disease): s/p 3v CABG 2004  Diabetes Mellitus, Type II  S/P primary angioplasty with coronary stent  Compound fracture: left leg  CABG (Coronary Artery Bypass Graft): 2004      Review of Systems:  Constitutional: No fever, chills, fatigue  Neuro: No headache, numbness, weakness  Resp: No cough, wheezing, shortness of breath  CVS: No chest pain, palpitations, leg swelling  GI: No abdominal pain, nausea, vomiting, diarrhea   : No dysuria, frequency, incontinence  Skin: No itching, burning, rashes, or lesions   Msk: No joint pain or swelling  Psych: No depression, anxiety, mood swings    T(F): --  HR: 150 (10-16-19 @ 03:00) (133 - 150)  BP: 173/77 (10-16-19 @ 03:00) (173/77 - 177/90)  RR: 26 (10-16-19 @ 03:00) (26 - 30)  SpO2: 95% (10-16-19 @ 03:00) (78% - 95%)  Wt(kg): --        CAPILLARY BLOOD GLUCOSE          I&O's Summary      Physical Exam:     Gen:   Neuro: A&Ox3, non-focal  HEENT: NC/AT  Resp: CTA b/l  CVS: nl S1/S2, RRR  Abd: soft, nt, nd, +bs  Ext: no edema, +pulses  Skin: well perfused, warm    Meds:  vancomycin  IVPB IV Intermittent      methylPREDNISolone sodium succinate Injectable IV Push                                                12.4   13.73 )-----------( 317      ( 16 Oct 2019 01:35 )             40.5       10-16    142  |  104  |  15  ----------------------------<  153<H>  4.3   |  30  |  1.20    Ca    9.9      16 Oct 2019 01:35    TPro  7.2  /  Alb  4.0  /  TBili  0.4  /  DBili  x   /  AST  23  /  ALT  36  /  AlkPhos  90  10-16    Lactate 2.7           10-16 @ 01:35      CARDIAC MARKERS ( 16 Oct 2019 01:35 )  <.015 ng/mL / x     / 87 U/L / x     / 3.9 ng/mL                  CXR:    CTH:    CT Chest:    CT A/P:    TTE:        CENTRAL LINE: yes/no    SHARMA: yes/no    A-LINE: yes/no    GLOBAL ISSUE/BEST PRACTICE:  Analgesia:  Sedation:  HOB elevation: yes  Stress ulcer prophylaxis:  VTE prophylaxis:  Glycemic control:  Nutrition:      CODE STATUS: ***  GOC discussion: Y  Critical care time spent (mins): ***  (Reviewing data, imaging, discussing with multidisciplinary team, non inclusive of procedures, discussing goals of care with patient/family) In Brief:  79M with PMHx of cad s/p cabg x3 2004, recent stent placement, htn, endstage copd (on 2 liters O2 and nocturnal bipap), osteomyelitis, dm, cardiac arrest 6/18, presented with acute sob. Pt admitted with acute respiratory failure due to COPD exacerbation and tx with empiric abx, nebs and steroids. Pt also placed on bipap 16/6/40%. ICU now consulted due to worsening respiratory distress and tachycardia.    24 hour events: Pt seen and examined at bedside. Chart/labs reviewed. HR initially 140's ST > SVT to 200 and broke prior to 130 with cardizem 10mg IVP. ABG 7.2/75/55, Bipap settings increased to 22/8 60%. Pt accepted to ICU for further management    PAST MEDICAL & SURGICAL HISTORY:  PVD (peripheral vascular disease)  Hypertension  COPD (chronic obstructive pulmonary disease): on 2L at home and BiPAP at night; intubated 6/18  Osteomyelitis  Dyslipidemia  CAD (Coronary Artery Disease): s/p 3v CABG 2004  Diabetes Mellitus, Type II  S/P primary angioplasty with coronary stent  Compound fracture: left leg  CABG (Coronary Artery Bypass Graft): 2004      Review of Systems:  Constitutional: No fever, chills, fatigue  Neuro: No headache, numbness, weakness  Resp: No cough, wheezing, +shortness of breath  CVS: No chest pain, palpitations, leg swelling  GI: No abdominal pain, nausea, vomiting, diarrhea   : No dysuria, frequency, incontinence  Skin: No itching, burning, rashes, or lesions   Msk: No joint pain or swelling  Psych: No depression, anxiety, mood swings      T(F): --  HR: 150 (10-16-19 @ 03:00) (133 - 150)  BP: 173/77 (10-16-19 @ 03:00) (173/77 - 177/90)  RR: 26 (10-16-19 @ 03:00) (26 - 30)  SpO2: 95% (10-16-19 @ 03:00) (78% - 95%)  Wt(kg): --        Physical Exam:     Gen: moderate distress  Neuro: A&Ox3, non-focal  HEENT: NC/AT  Resp: Decreased BS b/l, + expiratory wheeze  CVS: nl S1/S2, tachy  Abd: soft, nt, nd, +bs  Ext: no edema, +pulses  Skin: well perfused, warm      Meds:    Zosyn  vancomycin  IVPB IV Intermittent  methylPREDNISolone sodium succinate Injectable IV Push  Duonebs                                  12.4   13.73 )-----------( 317      ( 16 Oct 2019 01:35 )             40.5       10-16    142  |  104  |  15  ----------------------------<  153<H>  4.3   |  30  |  1.20    Ca    9.9      16 Oct 2019 01:35    TPro  7.2  /  Alb  4.0  /  TBili  0.4  /  DBili  x   /  AST  23  /  ALT  36  /  AlkPhos  90  10-16    Lactate 2.7           10-16 @ 01:35      CARDIAC MARKERS ( 16 Oct 2019 01:35 )  <.015 ng/mL / x     / 87 U/L / x     / 3.9 ng/mL                  CXR:            CODE STATUS: Full  GOC discussion: Y  Critical care time spent (mins): 65  (Reviewing data, imaging, discussing with multidisciplinary team, non inclusive of procedures, discussing goals of care with patient/family)

## 2019-10-16 NOTE — PROGRESS NOTE ADULT - SUBJECTIVE AND OBJECTIVE BOX
24 hour events: Patient seen and examined at bedside. Appeared comfortable on bipap, saturating well. Per wife at bedside, had family over during the weekend, unsure if there is any sick contacts.     Review of Systems:  Constitutional: No fever, chills  Neuro: No headache, numbness, weakness  Resp: on Bipap, improved sob  CVS: chronic b/l LE swelling, No chest pain, palpitations  GI: No abdominal pain, nausea, vomiting, diarrhea   : No dysuria, frequency, incontinence  Psych: No depression, anxiety, mood swings    T(F): 97.6 (10-16-19 @ 07:57), Max: 98.2 (10-16-19 @ 04:35)  HR: 101 (10-16-19 @ 09:00) (101 - 150)  BP: 112/58 (10-16-19 @ 09:00) (108/61 - 177/90)  RR: 17 (10-16-19 @ 09:00) (17 - 32)  SpO2: 100% (10-16-19 @ 09:00) (78% - 100%)  Wt(kg): --        CAPILLARY BLOOD GLUCOSE      POCT Blood Glucose.: 214 mg/dL (16 Oct 2019 05:29)      I&O's Summary    15 Oct 2019 07:01  -  16 Oct 2019 07:00  --------------------------------------------------------  IN: 550 mL / OUT: 0 mL / NET: 550 mL    16 Oct 2019 07:01  -  16 Oct 2019 09:55  --------------------------------------------------------  IN: 200 mL / OUT: 0 mL / NET: 200 mL        Physical Exam:   Gen: on bipap, awake, appeared comfortable, NAD  Neuro: AAOx3, able to converse through bipap, no focal deficits  HEENT: NCAT, opens both eyes, no JVD  Resp: on bipap, decreased breathsounds b/l, no wheezing  CVS: tachycardic, s1s2  Abd: soft, nontender, nondistended, bsx4  Ext: 1+ pitting edema b/l LE (per wife has been chronic)  Skin: warm, well perfused         MEDICATIONS  (STANDING):  ALBUTerol/ipratropium for Nebulization 3 milliLiter(s) Nebulizer every 6 hours  aspirin enteric coated 81 milliGRAM(s) Oral daily  atorvastatin 40 milliGRAM(s) Oral at bedtime  buDESOnide    Inhalation Suspension 0.5 milliGRAM(s) Inhalation two times a day  clopidogrel Tablet 75 milliGRAM(s) Oral daily  dextrose 5%. 1000 milliLiter(s) (50 mL/Hr) IV Continuous <Continuous>  dextrose 50% Injectable 12.5 Gram(s) IV Push once  dextrose 50% Injectable 25 Gram(s) IV Push once  dextrose 50% Injectable 25 Gram(s) IV Push once  guaiFENesin  milliGRAM(s) Oral every 12 hours  heparin  Injectable 5000 Unit(s) SubCutaneous every 8 hours  influenza   Vaccine 0.5 milliLiter(s) IntraMuscular once  insulin lispro (HumaLOG) corrective regimen sliding scale   SubCutaneous every 6 hours  methylPREDNISolone sodium succinate Injectable 40 milliGRAM(s) IV Push every 8 hours  metoprolol tartrate 25 milliGRAM(s) Oral daily  pantoprazole  Injectable 40 milliGRAM(s) IV Push daily  piperacillin/tazobactam IVPB.. 3.375 Gram(s) IV Intermittent every 8 hours  sodium chloride 0.9%. 1000 milliLiter(s) (100 mL/Hr) IV Continuous <Continuous>  tamsulosin 0.4 milliGRAM(s) Oral at bedtime  vancomycin  IVPB 1000 milliGRAM(s) IV Intermittent every 12 hours    MEDICATIONS  (PRN):  acetaminophen   Tablet .. 650 milliGRAM(s) Oral every 6 hours PRN Temp greater or equal to 38C (100.4F), Mild Pain (1 - 3)  dextrose 40% Gel 15 Gram(s) Oral once PRN Blood Glucose LESS THAN 70 milliGRAM(s)/deciliter  glucagon  Injectable 1 milliGRAM(s) IntraMuscular once PRN Glucose LESS THAN 70 milligrams/deciliter                              12.4   13.73 )-----------( 317      ( 16 Oct 2019 01:35 )             40.5       10-16    142  |  104  |  15  ----------------------------<  153<H>  4.3   |  30  |  1.20    Ca    9.9      16 Oct 2019 01:35    TPro  7.2  /  Alb  4.0  /  TBili  0.4  /  DBili  x   /  AST  23  /  ALT  36  /  AlkPhos  90  10-16    Lactate 2.7           10-16 @ 01:35      CARDIAC MARKERS ( 16 Oct 2019 08:23 )  .021 ng/mL / x     / x     / x     / x      CARDIAC MARKERS ( 16 Oct 2019 01:35 )  <.015 ng/mL / x     / 87 U/L / x     / 3.9 ng/mL          Radiology:     < from: Xray Chest 1 View AP/PA (10.16.19 @ 01:57) >  EXAM:  XR CHEST AP OR PA 1V                            PROCEDURE DATE:  10/16/2019        INTERPRETATION:  Clinical information: Shortness of breath.    Technique: Frontal view of the chest.    Comparison: Prior chest x-ray examination from 10/1/2019.    Findings: The patient is status post median sternotomy.    Small layering bilateral pleural effusions are noted. The   cardiomediastinal silhouette is normal. There are mild multilevel   degenerative changes of the thoracic spine.    IMPRESSION: Small bilateral pleural effusions.        LANE SALOMON M.D., ATTENDING RADIOLOGIST  This document has been electronically signed. Oct 16 2019  8:25AM    < end of copied text >    Bedside ultrasound: B lines on anterior chest    CENTRAL LINE: N  SHARMA: N                        A-LINE: N    GLOBAL ISSUE/BEST PRACTICE:  Analgesia: N  Sedation: N  CAM-ICU: n/a  HOB elevation: yes  Stress ulcer prophylaxis: n/a  VTE prophylaxis: Y  Glycemic control: Y  Nutrition: NPO    CODE STATUS: full

## 2019-10-17 LAB
ALBUMIN SERPL ELPH-MCNC: 2.9 G/DL — LOW (ref 3.3–5)
ALP SERPL-CCNC: 63 U/L — SIGNIFICANT CHANGE UP (ref 40–120)
ALT FLD-CCNC: 28 U/L — SIGNIFICANT CHANGE UP (ref 12–78)
ANION GAP SERPL CALC-SCNC: 3 MMOL/L — LOW (ref 5–17)
AST SERPL-CCNC: 14 U/L — LOW (ref 15–37)
BASOPHILS # BLD AUTO: 0 K/UL — SIGNIFICANT CHANGE UP (ref 0–0.2)
BASOPHILS NFR BLD AUTO: 0 % — SIGNIFICANT CHANGE UP (ref 0–2)
BILIRUB DIRECT SERPL-MCNC: <.1 MG/DL — SIGNIFICANT CHANGE UP (ref 0.05–0.2)
BILIRUB INDIRECT FLD-MCNC: >0.2 MG/DL — SIGNIFICANT CHANGE UP (ref 0.2–1)
BILIRUB SERPL-MCNC: 0.3 MG/DL — SIGNIFICANT CHANGE UP (ref 0.2–1.2)
BUN SERPL-MCNC: 18 MG/DL — SIGNIFICANT CHANGE UP (ref 7–23)
CALCIUM SERPL-MCNC: 8.5 MG/DL — SIGNIFICANT CHANGE UP (ref 8.5–10.1)
CHLORIDE SERPL-SCNC: 103 MMOL/L — SIGNIFICANT CHANGE UP (ref 96–108)
CO2 SERPL-SCNC: 34 MMOL/L — HIGH (ref 22–31)
CREAT SERPL-MCNC: 1.2 MG/DL — SIGNIFICANT CHANGE UP (ref 0.5–1.3)
EOSINOPHIL # BLD AUTO: 0.23 K/UL — SIGNIFICANT CHANGE UP (ref 0–0.5)
EOSINOPHIL NFR BLD AUTO: 2 % — SIGNIFICANT CHANGE UP (ref 0–6)
GLUCOSE SERPL-MCNC: 170 MG/DL — HIGH (ref 70–99)
HBA1C BLD-MCNC: 7.2 % — HIGH (ref 4–5.6)
HCT VFR BLD CALC: 31 % — LOW (ref 39–50)
HGB BLD-MCNC: 9.5 G/DL — LOW (ref 13–17)
LYMPHOCYTES # BLD AUTO: 1.39 K/UL — SIGNIFICANT CHANGE UP (ref 1–3.3)
LYMPHOCYTES # BLD AUTO: 12 % — LOW (ref 13–44)
MAGNESIUM SERPL-MCNC: 1.7 MG/DL — SIGNIFICANT CHANGE UP (ref 1.6–2.6)
MCHC RBC-ENTMCNC: 27.2 PG — SIGNIFICANT CHANGE UP (ref 27–34)
MCHC RBC-ENTMCNC: 30.6 GM/DL — LOW (ref 32–36)
MCV RBC AUTO: 88.8 FL — SIGNIFICANT CHANGE UP (ref 80–100)
MONOCYTES # BLD AUTO: 1.86 K/UL — HIGH (ref 0–0.9)
MONOCYTES NFR BLD AUTO: 16 % — HIGH (ref 2–14)
NEUTROPHILS # BLD AUTO: 8.13 K/UL — HIGH (ref 1.8–7.4)
NEUTROPHILS NFR BLD AUTO: 68 % — SIGNIFICANT CHANGE UP (ref 43–77)
NRBC # BLD: SIGNIFICANT CHANGE UP /100 WBCS (ref 0–0)
PHOSPHATE SERPL-MCNC: 3.2 MG/DL — SIGNIFICANT CHANGE UP (ref 2.5–4.5)
PLATELET # BLD AUTO: 219 K/UL — SIGNIFICANT CHANGE UP (ref 150–400)
POTASSIUM SERPL-MCNC: 4 MMOL/L — SIGNIFICANT CHANGE UP (ref 3.5–5.3)
POTASSIUM SERPL-SCNC: 4 MMOL/L — SIGNIFICANT CHANGE UP (ref 3.5–5.3)
PROT SERPL-MCNC: 5.4 G/DL — LOW (ref 6–8.3)
RBC # BLD: 3.49 M/UL — LOW (ref 4.2–5.8)
RBC # FLD: 13.1 % — SIGNIFICANT CHANGE UP (ref 10.3–14.5)
SODIUM SERPL-SCNC: 140 MMOL/L — SIGNIFICANT CHANGE UP (ref 135–145)
VANCOMYCIN TROUGH SERPL-MCNC: 11.3 UG/ML — SIGNIFICANT CHANGE UP (ref 10–20)
WBC # BLD: 11.62 K/UL — HIGH (ref 3.8–10.5)
WBC # FLD AUTO: 11.62 K/UL — HIGH (ref 3.8–10.5)

## 2019-10-17 PROCEDURE — 76604 US EXAM CHEST: CPT | Mod: 26

## 2019-10-17 PROCEDURE — 99232 SBSQ HOSP IP/OBS MODERATE 35: CPT

## 2019-10-17 PROCEDURE — 99291 CRITICAL CARE FIRST HOUR: CPT

## 2019-10-17 RX ORDER — INSULIN GLARGINE 100 [IU]/ML
10 INJECTION, SOLUTION SUBCUTANEOUS
Refills: 0 | Status: DISCONTINUED | OUTPATIENT
Start: 2019-10-17 | End: 2019-10-18

## 2019-10-17 RX ORDER — MAGNESIUM SULFATE 500 MG/ML
2 VIAL (ML) INJECTION ONCE
Refills: 0 | Status: COMPLETED | OUTPATIENT
Start: 2019-10-17 | End: 2019-10-17

## 2019-10-17 RX ORDER — METOPROLOL TARTRATE 50 MG
12.5 TABLET ORAL
Refills: 0 | Status: DISCONTINUED | OUTPATIENT
Start: 2019-10-17 | End: 2019-10-18

## 2019-10-17 RX ORDER — INSULIN LISPRO 100/ML
6 VIAL (ML) SUBCUTANEOUS ONCE
Refills: 0 | Status: COMPLETED | OUTPATIENT
Start: 2019-10-17 | End: 2019-10-17

## 2019-10-17 RX ORDER — INSULIN GLARGINE 100 [IU]/ML
10 INJECTION, SOLUTION SUBCUTANEOUS AT BEDTIME
Refills: 0 | Status: DISCONTINUED | OUTPATIENT
Start: 2019-10-17 | End: 2019-10-17

## 2019-10-17 RX ADMIN — Medication 12: at 14:02

## 2019-10-17 RX ADMIN — HEPARIN SODIUM 5000 UNIT(S): 5000 INJECTION INTRAVENOUS; SUBCUTANEOUS at 21:35

## 2019-10-17 RX ADMIN — Medication 12.5 MILLIGRAM(S): at 17:18

## 2019-10-17 RX ADMIN — Medication 25 MILLIGRAM(S): at 05:22

## 2019-10-17 RX ADMIN — Medication 4: at 01:59

## 2019-10-17 RX ADMIN — Medication 6: at 10:42

## 2019-10-17 RX ADMIN — Medication 50 GRAM(S): at 10:08

## 2019-10-17 RX ADMIN — INSULIN GLARGINE 10 UNIT(S): 100 INJECTION, SOLUTION SUBCUTANEOUS at 14:31

## 2019-10-17 RX ADMIN — HEPARIN SODIUM 5000 UNIT(S): 5000 INJECTION INTRAVENOUS; SUBCUTANEOUS at 05:23

## 2019-10-17 RX ADMIN — Medication 50 MILLIGRAM(S): at 17:18

## 2019-10-17 RX ADMIN — Medication 250 MILLIGRAM(S): at 17:17

## 2019-10-17 RX ADMIN — Medication 3 MILLILITER(S): at 13:23

## 2019-10-17 RX ADMIN — ATORVASTATIN CALCIUM 40 MILLIGRAM(S): 80 TABLET, FILM COATED ORAL at 21:35

## 2019-10-17 RX ADMIN — Medication 3 MILLILITER(S): at 01:29

## 2019-10-17 RX ADMIN — Medication 0.5 MILLIGRAM(S): at 07:48

## 2019-10-17 RX ADMIN — Medication 4: at 18:07

## 2019-10-17 RX ADMIN — Medication 6 UNIT(S): at 14:03

## 2019-10-17 RX ADMIN — HEPARIN SODIUM 5000 UNIT(S): 5000 INJECTION INTRAVENOUS; SUBCUTANEOUS at 13:53

## 2019-10-17 RX ADMIN — Medication 250 MILLIGRAM(S): at 05:25

## 2019-10-17 RX ADMIN — TAMSULOSIN HYDROCHLORIDE 0.4 MILLIGRAM(S): 0.4 CAPSULE ORAL at 21:35

## 2019-10-17 RX ADMIN — Medication 600 MILLIGRAM(S): at 17:18

## 2019-10-17 RX ADMIN — Medication 4: at 21:40

## 2019-10-17 RX ADMIN — Medication 0.5 MILLIGRAM(S): at 19:48

## 2019-10-17 RX ADMIN — CLOPIDOGREL BISULFATE 75 MILLIGRAM(S): 75 TABLET, FILM COATED ORAL at 13:06

## 2019-10-17 RX ADMIN — PIPERACILLIN AND TAZOBACTAM 25 GRAM(S): 4; .5 INJECTION, POWDER, LYOPHILIZED, FOR SOLUTION INTRAVENOUS at 17:17

## 2019-10-17 RX ADMIN — PIPERACILLIN AND TAZOBACTAM 25 GRAM(S): 4; .5 INJECTION, POWDER, LYOPHILIZED, FOR SOLUTION INTRAVENOUS at 10:08

## 2019-10-17 RX ADMIN — Medication 2: at 05:40

## 2019-10-17 RX ADMIN — Medication 3 MILLILITER(S): at 07:48

## 2019-10-17 RX ADMIN — PANTOPRAZOLE SODIUM 40 MILLIGRAM(S): 20 TABLET, DELAYED RELEASE ORAL at 13:06

## 2019-10-17 RX ADMIN — Medication 600 MILLIGRAM(S): at 05:21

## 2019-10-17 RX ADMIN — Medication 50 MILLIGRAM(S): at 05:21

## 2019-10-17 RX ADMIN — Medication 81 MILLIGRAM(S): at 13:06

## 2019-10-17 RX ADMIN — PIPERACILLIN AND TAZOBACTAM 25 GRAM(S): 4; .5 INJECTION, POWDER, LYOPHILIZED, FOR SOLUTION INTRAVENOUS at 02:02

## 2019-10-17 RX ADMIN — Medication 3 MILLILITER(S): at 19:48

## 2019-10-17 NOTE — PROGRESS NOTE ADULT - ASSESSMENT
ASSESSMENT:    79M w/ PMH listed below, admitted to ICU with AECOPD, requiring high level BiPAP support, and further complicated by afib with RVR.     Events last 24 hours:   -BiPAp need now much less and afib with RVR has resolved.     PLAN:    -will conitnue with BiPAp support nocturnal and PRN, goal to wean down to home O2 requirement of 2L  -will continue ASA/plavix for Hx of recent stent placement.   -lopressor for continued rate control.   -will complete course of vanco/zosyn for PNA  -ISS for glycemic control while admitted.   -nebs/steroids for COPD  -heparin for DVt prophylaxis.

## 2019-10-17 NOTE — PROGRESS NOTE ADULT - ASSESSMENT
79m recent admission with copd exacerbation in telemetry unit, patient has hx of  COPD (On home O2 2L and BIPAP at while sleeping regardless of time of day), CAD (w/ 3v CABG 2004),s/p 2 recent coronary stensts in [facility] , HLD, DM2 (on Metformin), BPH, hx Hypercapnic Respiratory  Failure/Cardiac Arrest requiring intubation (6/18) Denies smoking/drinking/drug use  Diagnosed with COPD EXACERBATION AND ACUTE  HYPERCAPNIC AND HYPOXIC RESPIRATORY FAILURE ,placed on BIPAP , Found to have SVT.       -no recurrent af noted  -was in sr with a tachycardic rate, which has improved now that respiratory status has improved  -cont to follow rate and rhythm  -cont bb  - no ac noting need for dapt and acute hgb drop of ~3g between yesterday and today    -no acute ischemia  -cont bb  -cont dapt  -cont statin    -no vol ol    -resp status remains tenuous noting multiple prior admissions for life threatening resp compromise.  needs to be watched carefully and is at ris of abrupt decompensation.  I have personally provided  35 minutes of critical care time, excluding time spent on separate procedures. 79m recent admission with copd exacerbation in telemetry unit, patient has hx of  COPD (On home O2 2L and BIPAP at while sleeping regardless of time of day), CAD (w/ 3v CABG 2004),s/p 2 recent coronary stensts in [facility] , HLD, DM2 (on Metformin), BPH, hx Hypercapnic Respiratory  Failure/Cardiac Arrest requiring intubation (6/18) Denies smoking/drinking/drug use  Diagnosed with COPD EXACERBATION AND ACUTE  HYPERCAPNIC AND HYPOXIC RESPIRATORY FAILURE ,placed on BIPAP , Found to have SVT.       -no recurrent af noted  -was in sr with a tachycardic rate, which has improved now that respiratory status has improved  -cont to follow rate and rhythm  -cont bb  - no ac noting need for dapt and acute hgb drop of ~3g between yesterday and today    -no acute ischemia  -cont bb  -cont dapt despite hgb drop noting recent pci  -cont statin    -no vol ol    -trend hgb closely  	  -resp status remains tenuous noting multiple prior admissions for life threatening resp compromise.  needs to be watched carefully and is at ris of abrupt decompensation.  I have personally provided  35 minutes of critical care time, excluding time spent on separate procedures.

## 2019-10-17 NOTE — PROGRESS NOTE ADULT - ASSESSMENT
Doing better Off bpap PATIENT  COMMODORE YUE CAO 10/16/2019  PULMONARY/CRITICAL CARE ASSESSMENT/RECOMMENDATIONS                    AC COMBINED HYPERCAPNIC HYPOXEMIC RESP FAILURE   Improved with bpap   POSSIBLE RESP TRACT INFECTION   10/16-10/17 W 13.7-11.6   Suggest check procalc to help deescalaation decisions   LACTICEMIA   Likely due to wob rather than sepsis   Agree with iv fluids Monitor serially   COPD ex   On bd steroids   CAD  Doubt onoin isch Is on dapt   and  bb                     TIME SPENT Over 25 minutes aggregate care time spent on encounter; activities included   direct pt care, counseling and/or coordinating care reviewing notes, lab data/ imaging , discussion with multidisciplinary team/ pt /family. Risks, benefits, alternatives  discussed in detail.

## 2019-10-17 NOTE — PROGRESS NOTE ADULT - SUBJECTIVE AND OBJECTIVE BOX
24 hour events: Patient seen and examined at bedside. Appeared comfortable, saturating well initially on NC and continued to saturate well on room air. He denies any complaints today.     Review of Systems:  Constitutional: No fever, chills  Neuro: No headache, numbness, weakness  Resp: denies any sob, wheezing, difficulty breathing   CVS: chronic b/l LE swelling, No chest pain, palpitations  GI: No abdominal pain, nausea, vomiting, diarrhea   : No dysuria, frequency, incontinence  Psych: No depression, anxiety, mood swings    ICU Vital Signs Last 24 Hrs  T(C): 36.4 (17 Oct 2019 08:00), Max: 36.9 (16 Oct 2019 20:08)  T(F): 97.5 (17 Oct 2019 08:00), Max: 98.5 (16 Oct 2019 20:08)  HR: 111 (17 Oct 2019 10:00) (68 - 111)  BP: 115/58 (17 Oct 2019 09:00) (99/56 - 133/63)  BP(mean): 77 (17 Oct 2019 09:00) (72 - 90)  ABP: --  ABP(mean): --  RR: 24 (17 Oct 2019 10:00) (15 - 46)  SpO2: 99% (17 Oct 2019 10:00) (99% - 100%)        CAPILLARY BLOOD GLUCOSE      POCT Blood Glucose.: 192 mg/dL (17 Oct 2019 05:39)  POCT Blood Glucose.: 241 mg/dL (17 Oct 2019 01:56)  POCT Blood Glucose.: 298 mg/dL (16 Oct 2019 22:08)  POCT Blood Glucose.: 422 mg/dL (16 Oct 2019 17:31)  POCT Blood Glucose.: 305 mg/dL (16 Oct 2019 12:31)        I&O's Summary    15 Oct 2019 07:01  -  16 Oct 2019 07:00  --------------------------------------------------------  IN: 550 mL / OUT: 0 mL / NET: 550 mL    16 Oct 2019 07:01  -  16 Oct 2019 09:55  --------------------------------------------------------  IN: 200 mL / OUT: 0 mL / NET: 200 mL        Physical Exam:   Gen: on bipap, awake, appeared comfortable, NAD  Neuro: AAOx3, able to converse through bipap, no focal deficits  HEENT: NCAT, opens both eyes, no JVD  Resp: on bipap, decreased breathsounds b/l, no wheezing  CVS: tachycardic, s1s2  Abd: soft, nontender, nondistended, bsx4  Ext: 1+ pitting edema b/l LE (per wife has been chronic)  Skin: warm, well perfused         MEDICATIONS  (STANDING):  ALBUTerol/ipratropium for Nebulization 3 milliLiter(s) Nebulizer every 6 hours  aspirin enteric coated 81 milliGRAM(s) Oral daily  atorvastatin 40 milliGRAM(s) Oral at bedtime  buDESOnide    Inhalation Suspension 0.5 milliGRAM(s) Inhalation two times a day  clopidogrel Tablet 75 milliGRAM(s) Oral daily  dextrose 5%. 1000 milliLiter(s) (50 mL/Hr) IV Continuous <Continuous>  dextrose 50% Injectable 12.5 Gram(s) IV Push once  dextrose 50% Injectable 25 Gram(s) IV Push once  dextrose 50% Injectable 25 Gram(s) IV Push once  guaiFENesin  milliGRAM(s) Oral every 12 hours  heparin  Injectable 5000 Unit(s) SubCutaneous every 8 hours  influenza   Vaccine 0.5 milliLiter(s) IntraMuscular once  insulin lispro (HumaLOG) corrective regimen sliding scale   SubCutaneous every 6 hours  methylPREDNISolone sodium succinate Injectable 40 milliGRAM(s) IV Push every 8 hours  metoprolol tartrate 25 milliGRAM(s) Oral daily  pantoprazole  Injectable 40 milliGRAM(s) IV Push daily  piperacillin/tazobactam IVPB.. 3.375 Gram(s) IV Intermittent every 8 hours  sodium chloride 0.9%. 1000 milliLiter(s) (100 mL/Hr) IV Continuous <Continuous>  tamsulosin 0.4 milliGRAM(s) Oral at bedtime  vancomycin  IVPB 1000 milliGRAM(s) IV Intermittent every 12 hours    MEDICATIONS  (PRN):  acetaminophen   Tablet .. 650 milliGRAM(s) Oral every 6 hours PRN Temp greater or equal to 38C (100.4F), Mild Pain (1 - 3)  dextrose 40% Gel 15 Gram(s) Oral once PRN Blood Glucose LESS THAN 70 milliGRAM(s)/deciliter  glucagon  Injectable 1 milliGRAM(s) IntraMuscular once PRN Glucose LESS THAN 70 milligrams/deciliter                              12.4   13.73 )-----------( 317      ( 16 Oct 2019 01:35 )             40.5       10-16    142  |  104  |  15  ----------------------------<  153<H>  4.3   |  30  |  1.20    Ca    9.9      16 Oct 2019 01:35    TPro  7.2  /  Alb  4.0  /  TBili  0.4  /  DBili  x   /  AST  23  /  ALT  36  /  AlkPhos  90  10-16    Lactate 2.7           10-16 @ 01:35      CARDIAC MARKERS ( 16 Oct 2019 08:23 )  .021 ng/mL / x     / x     / x     / x      CARDIAC MARKERS ( 16 Oct 2019 01:35 )  <.015 ng/mL / x     / 87 U/L / x     / 3.9 ng/mL          Radiology:     < from: Xray Chest 1 View AP/PA (10.16.19 @ 01:57) >  EXAM:  XR CHEST AP OR PA 1V                            PROCEDURE DATE:  10/16/2019        INTERPRETATION:  Clinical information: Shortness of breath.    Technique: Frontal view of the chest.    Comparison: Prior chest x-ray examination from 10/1/2019.    Findings: The patient is status post median sternotomy.    Small layering bilateral pleural effusions are noted. The   cardiomediastinal silhouette is normal. There are mild multilevel   degenerative changes of the thoracic spine.    IMPRESSION: Small bilateral pleural effusions.        LANE SALOMON M.D., ATTENDING RADIOLOGIST  This document has been electronically signed. Oct 16 2019  8:25AM    < end of copied text >    Bedside ultrasound: B lines on anterior chest    CENTRAL LINE: N  SHARMA: N                        A-LINE: N    GLOBAL ISSUE/BEST PRACTICE:  Analgesia: N  Sedation: N  CAM-ICU: n/a  HOB elevation: yes  Stress ulcer prophylaxis: n/a  VTE prophylaxis: Y  Glycemic control: Y  Nutrition: NPO    CODE STATUS: full 24 hour events: Patient seen and examined, sitting in chair. Appeared comfortable, saturating well initially on NC and continued to saturate well on room air. He denies any complaints today.     Review of Systems:  Constitutional: No fever, chills  Neuro: No headache, numbness, weakness  Resp: denies any sob, wheezing, difficulty breathing   CVS: chronic b/l LE swelling, No chest pain, palpitations  GI: No abdominal pain, nausea, vomiting, diarrhea   : No dysuria, frequency, incontinence  Psych: No depression, anxiety, mood swings    ICU Vital Signs Last 24 Hrs  T(C): 36.4 (17 Oct 2019 08:00), Max: 36.9 (16 Oct 2019 20:08)  T(F): 97.5 (17 Oct 2019 08:00), Max: 98.5 (16 Oct 2019 20:08)  HR: 111 (17 Oct 2019 10:00) (68 - 111)  BP: 115/58 (17 Oct 2019 09:00) (99/56 - 133/63)  BP(mean): 77 (17 Oct 2019 09:00) (72 - 90)  ABP: --  ABP(mean): --  RR: 24 (17 Oct 2019 10:00) (15 - 46)  SpO2: 99% (17 Oct 2019 10:00) (99% - 100%)        CAPILLARY BLOOD GLUCOSE      POCT Blood Glucose.: 192 mg/dL (17 Oct 2019 05:39)  POCT Blood Glucose.: 241 mg/dL (17 Oct 2019 01:56)  POCT Blood Glucose.: 298 mg/dL (16 Oct 2019 22:08)  POCT Blood Glucose.: 422 mg/dL (16 Oct 2019 17:31)  POCT Blood Glucose.: 305 mg/dL (16 Oct 2019 12:31)        I&O's Summary    16 Oct 2019 07:01  -  17 Oct 2019 07:00  --------------------------------------------------------  IN: 2980 mL / OUT: 2320 mL / NET: 660 mL    17 Oct 2019 07:01  -  17 Oct 2019 10:33  --------------------------------------------------------  IN: 420 mL / OUT: 500 mL / NET: -80 mL      Physical Exam:   Gen: sitting in chair on room air, awake, appeared comfortable, NAD  Neuro: AAOx3, answering all questions appropriately, no focal deficits  HEENT: NCAT, PERRL, no JVD  Resp: saturating well on room air, CTA b/l, no wheezing  CVS: RRR, S1, S2  Abd: soft, nontender, nondistended, bsx4  Ext: 1+ pitting edema b/l LE (per wife has been chronic)  Skin: warm, well perfused       MEDICATIONS  (STANDING):  ALBUTerol/ipratropium for Nebulization 3 milliLiter(s) Nebulizer every 6 hours  aspirin enteric coated 81 milliGRAM(s) Oral daily  atorvastatin 40 milliGRAM(s) Oral at bedtime  buDESOnide    Inhalation Suspension 0.5 milliGRAM(s) Inhalation two times a day  clopidogrel Tablet 75 milliGRAM(s) Oral daily  dextrose 5%. 1000 milliLiter(s) (50 mL/Hr) IV Continuous <Continuous>  dextrose 50% Injectable 12.5 Gram(s) IV Push once  dextrose 50% Injectable 25 Gram(s) IV Push once  dextrose 50% Injectable 25 Gram(s) IV Push once  guaiFENesin  milliGRAM(s) Oral every 12 hours  heparin  Injectable 5000 Unit(s) SubCutaneous every 8 hours  influenza   Vaccine 0.5 milliLiter(s) IntraMuscular once  insulin lispro (HumaLOG) corrective regimen sliding scale   SubCutaneous every 4 hours  metoprolol tartrate 25 milliGRAM(s) Oral daily  pantoprazole  Injectable 40 milliGRAM(s) IV Push daily  piperacillin/tazobactam IVPB.. 3.375 Gram(s) IV Intermittent every 8 hours  predniSONE   Tablet 50 milliGRAM(s) Oral every 12 hours  tamsulosin 0.4 milliGRAM(s) Oral at bedtime  vancomycin  IVPB 1000 milliGRAM(s) IV Intermittent every 12 hours    MEDICATIONS  (PRN):  acetaminophen   Tablet .. 650 milliGRAM(s) Oral every 6 hours PRN Temp greater or equal to 38C (100.4F), Mild Pain (1 - 3)  dextrose 40% Gel 15 Gram(s) Oral once PRN Blood Glucose LESS THAN 70 milliGRAM(s)/deciliter  glucagon  Injectable 1 milliGRAM(s) IntraMuscular once PRN Glucose LESS THAN 70 milligrams/deciliter                              9.5    11.62 )-----------( 219      ( 17 Oct 2019 04:45 )             31.0       10-17    140  |  103  |  18  ----------------------------<  170<H>  4.0   |  34<H>  |  1.20    Ca    8.5      17 Oct 2019 04:45  Phos  3.2     10-17  Mg     1.7     10-17    TPro  5.4<L>  /  Alb  2.9<L>  /  TBili  0.3  /  DBili  <.10  /  AST  14<L>  /  ALT  28  /  AlkPhos  63  10-17        Radiology:     < from: Xray Chest 1 View AP/PA (10.16.19 @ 01:57) >  EXAM:  XR CHEST AP OR PA 1V                            PROCEDURE DATE:  10/16/2019        INTERPRETATION:  Clinical information: Shortness of breath.    Technique: Frontal view of the chest.    Comparison: Prior chest x-ray examination from 10/1/2019.    Findings: The patient is status post median sternotomy.    Small layering bilateral pleural effusions are noted. The   cardiomediastinal silhouette is normal. There are mild multilevel   degenerative changes of the thoracic spine.    IMPRESSION: Small bilateral pleural effusions.        LANE SALOMON M.D., ATTENDING RADIOLOGIST  This document has been electronically signed. Oct 16 2019  8:25AM    < end of copied text >    Bedside ultrasound: B lines on anterior chest    CENTRAL LINE: N  SHARMA: N                        A-LINE: N    GLOBAL ISSUE/BEST PRACTICE:  Analgesia: N  Sedation: N  CAM-ICU: n/a  HOB elevation: yes  Stress ulcer prophylaxis: n/a  VTE prophylaxis: Y  Glycemic control: Y  Nutrition: consistent carb    CODE STATUS: full

## 2019-10-17 NOTE — PROGRESS NOTE ADULT - ASSESSMENT
Patient is a 79/M with PMHx significant for HTN, COPD (On home O2 2L and BIPAP at night and prn, CAD w/ stents (most recent 8/2019 at [place]), CABG, HLD, DM2 (not on insulin), hx Hypercapnic Resp Failure/Cardiac Arrest requiring intubation (2018), osteomyelitis, presented with acute SOB. Patient was admitted with acute respiratory failure likely due to COPD exacerbation. ICU consulted for acute hypercapnic hypoxemic respiratory failure likely from acute COPD exacerbation and SVT.     Neuro: stable, tylenol prn for pain    Cardio: HR improved, mildly tachycardic likely due to duoneb, respiratory distress. CE negative. Continue lopressor, lipitor, asa, plavix. Cardio consulted Dr. Dani Kessler; BIPAP setting increased overnight, currently saturating well. ABG improved. Will attempt to wean off bipap onto NC if able to tolerate. f/u on RVP. Continue duoneb Q6H, pulmicort BID, mucinex, solumedrol Q8H    GI: NPO while on bipap, continue protonix    : stable, continue flomax, continue IVF hydration while NPO    ID: elevated lactate likely due to acute illness and stress, mild leukocytosis likely due to steroid use. Afebrile, procalc neg. Continue vanco and zosyn empirically, f/u on RVP and blood cx.     Endo: f/u HgA1c, continue ISS, routine accuchecks    Heme: heparin subQ for dvt ppx, continue asa, plavix Patient is a 79/M with PMHx significant for HTN, COPD (On home O2 2L and BIPAP at night and prn, CAD w/ stents (most recent 8/2019 at [place]), CABG, HLD, DM2 (not on insulin), hx Hypercapnic Resp Failure/Cardiac Arrest requiring intubation (2018), osteomyelitis, presented with acute SOB. Patient was admitted with acute respiratory failure likely due to COPD exacerbation. ICU consulted for acute hypercapnic hypoxemic respiratory failure likely from acute COPD exacerbation and new episode of afib which resolved     Neuro: stable, tylenol prn for pain    Cardio: rate controlled, will switch to home metoprolol 12.5 BID. Continue lipitor, asa, plavix. Will hold home med valsartan at this time given patient's controlled BP.     Pulm; off BIPAP, sitting in chair, saturating well on room air. Per patient, thought his symptoms might've been triggered by visiting dog at home. RVP negative, will continue empiric vanc and zosyn given multiple recent hospitalizations. Can d/c once cultures are negative. Continue duoneb, pulmicort, mucinex, and solumedrol.     GI: PO consistent carb diet, continue protonix     : stable, continue flomax    ID: initial elevated lactate likely due to acute illness and stress, mild leukocytosis likely due to steroid use. Afebrile, procalc neg. Continue vanco and zosyn empirically, RVP negative, f/u on blood cx     Endo: DM, continue ISS, routine accuchecks    Heme: heparin subQ for dvt ppx, continue asa, plavix    Dispo: stable for floor with remote tele and continuous pulse ox Patient is a 79/M with PMHx significant for HTN, COPD (On home O2 2L and BIPAP at night and prn, CAD w/ stents (most recent 8/2019 at [place]), CABG, HLD, DM2 (not on insulin), hx Hypercapnic Resp Failure/Cardiac Arrest requiring intubation (2018), osteomyelitis, presented with acute SOB. Patient was admitted with acute respiratory failure likely due to COPD exacerbation. ICU consulted for acute hypercapnic hypoxemic respiratory failure likely from acute COPD exacerbation and new episode of afib which resolved     Neuro: stable, tylenol prn for pain    Cardio: rate controlled, will switch to home metoprolol 12.5 BID. Continue lipitor, asa, plavix. Will hold home med valsartan at this time given patient's controlled BP.     Pulm; off BIPAP, sitting in chair, saturating well on room air. Per patient, thought his symptoms might've been triggered by visiting dog at home. RVP negative, will continue empiric vanc and zosyn given multiple recent hospitalizations. Can d/c once cultures are negative. Continue duoneb, pulmicort, mucinex, and solumedrol.     GI: PO consistent carb diet, continue protonix     : stable, continue flomax    ID: initial elevated lactate likely due to acute illness and stress, mild leukocytosis likely due to steroid use. Afebrile, procalc neg. Continue vanco and zosyn empirically, RVP negative, f/u on blood cx     Endo: DM, continue ISS, lantus, routine accuchecks    Heme: heparin subQ for dvt ppx, continue asa, plavix    Dispo: stable for floor with remote tele and continuous pulse ox

## 2019-10-17 NOTE — PROGRESS NOTE ADULT - PROBLEM SELECTOR PLAN 5
CORRECTIVE REGIMEN SLIDING SCALE ,FS elevated in setting of steroids, added Lantus 10units to be given today, continue high dose sliding scale

## 2019-10-17 NOTE — PROGRESS NOTE ADULT - ATTENDING COMMENTS
80 yo AAM with history of recent admission with copd exacerbation in telemetry unit  , patient has hx of  COPD (On home O2 2L and BIPAP at while sleeping regardless of time of day), CAD (w/ 3v CABG 2004),s/p 2 recent coronary stensts in [facility] , HLD, DM2 (on Metformin), BPH, hx Hypercapnic Respiratory  Failure/Cardiac Arrest requiring intubation (6/18) Denies smoking/drinking/drug use  Diagnosed with COPD EXACERBATION AND ACUTE  HYPERCAPNIC AND HYPOXIC RESPIRATORY FAILURE ,placed on BIPAP , Found to have SVT .Admit to critical care unit  ,SERIAL ABGS ,XRAYS OF CHEST , intravenous steroids nebulized bronchodilators and pulmonology evaluation,O2 supply, serial labs and chest xrays , send 3 sets of cardiac enzymes to rule out coronary event, obtain ECHO to evaluate LVEF, cardiology consult ,continue current management, O2 supply, anticoagulation plan as per cardiology consult Pneumonia suspected and vancomycin and zosyn were given in ER  ,found to have lactate elevation .Admitted for septic workup and evaluation,send blood and urine cx,serial lactate levels,monitor vitals closley,ivfs hydration,monitor urine output and renal profile,

## 2019-10-17 NOTE — PROGRESS NOTE ADULT - ASSESSMENT
78 yo AAM with history of recent admission with copd exacerbation in telemetry unit  , patient has hx of  COPD (On home O2 2L and BIPAP at while sleeping regardless of time of day), CAD (w/ 3v CABG 2004),s/p 2 recent coronary stensts in [facility] , HLD, DM2 (on Metformin), BPH, hx Hypercapnic Respiratory  Failure/Cardiac Arrest requiring intubation (6/18) Denies smoking/drinking/drug use  Diagnosed with COPD EXACERBATION AND ACUTE  HYPERCAPNIC AND HYPOXIC RESPIRATORY FAILURE ,placed on BIPAP , Found to have SVT .Admit to critical care unit  ,SERIAL ABGS ,XRAYS OF CHEST , intravenous steroids nebulized bronchodilators and pulmonology evaluation,O2 supply, serial labs and chest xrays , send 3 sets of cardiac enzymes to rule out coronary event, obtain ECHO to evaluate LVEF, cardiology consult ,continue current management, O2 supply, anticoagulation plan as per cardiology consult Pneumonia suspected and vancomycin and zosyn were given in ER  ,found to have lactate elevation .Admitted for septic workup and evaluation,send blood and urine cx,serial lactate levels,monitor vitals closleyallys hydration,monitor urine output and renal profile

## 2019-10-17 NOTE — PROGRESS NOTE ADULT - ATTENDING COMMENTS
79M h/o CAD s/p CABG x3 2004 with recent stent placement (8/2019), HTN, COPD (on 3L O2 and nocturnal bipap), osteomyelitis, DM, cardiac arrest 6/2018 a/w acute hypoxic and hypercarbic respiratory failure due to COPD exacerbation, transferred to ICU for worsening respiratory status on BiPAP and AFib with RVR in setting of hypoxia/respiratory distress now rate controlled and with improved resp status.    Neuro: no acute issues  CV: remains rate controlled in sinus rhythm, can continue home dose metoprolol 12.5mg BID, will hold off on restarting valsartan for now as pt with borderline blood pressure  Pulm: pt now saturating in mid 90s on room air without any signs of resp distress and clear lungs on exam; RVP negative; pt reports allergies to his dog that was given to his grandchildren which he believes may have exacerbated his baseline COPD; will continue nocturnal BiPAP, will require longer steroid taper for COPD exacerbation (had been on 30 day taper with previous hospitalization last month)  GI: tolerating diet, no acute issues  Renal: no acute issues  ID: continue broad spectrum abx pending culture results, if blood cultures negative can likely dc as no evidence of PNA on US/CXR or other signs of infection at this time  Endo: FS elevated in setting of steroids, added Lantus 10units to be given today, continue high dose sliding scale  Dispo: pt greatly improved, medically stable for transfer to medicine floors with remote tele/continuous pulse ox

## 2019-10-17 NOTE — PROGRESS NOTE ADULT - PROBLEM SELECTOR PLAN 2
will continue nocturnal BiPAP, will require longer steroid taper for COPD exacerbation (had been on 30 day taper with previous hospitalization last month)

## 2019-10-17 NOTE — PROGRESS NOTE ADULT - SUBJECTIVE AND OBJECTIVE BOX
[facility]    Saud Aguilar, Dani, Génesis, [place], Carroll, Kumar      [phone]    CHIEF COMPLAINT: Patient is a 79y old  Male who presents with a chief complaint of SOB (17 Oct 2019 03:06)      Follow Up: resp failure, paf, cad    Interim history: had been on bipap, dc'd. reports that his breathing is improved. no cp    MEDICATIONS  (STANDING):  ALBUTerol/ipratropium for Nebulization 3 milliLiter(s) Nebulizer every 6 hours  aspirin enteric coated 81 milliGRAM(s) Oral daily  atorvastatin 40 milliGRAM(s) Oral at bedtime  buDESOnide    Inhalation Suspension 0.5 milliGRAM(s) Inhalation two times a day  clopidogrel Tablet 75 milliGRAM(s) Oral daily  dextrose 5%. 1000 milliLiter(s) (50 mL/Hr) IV Continuous <Continuous>  dextrose 50% Injectable 12.5 Gram(s) IV Push once  dextrose 50% Injectable 25 Gram(s) IV Push once  dextrose 50% Injectable 25 Gram(s) IV Push once  guaiFENesin  milliGRAM(s) Oral every 12 hours  heparin  Injectable 5000 Unit(s) SubCutaneous every 8 hours  influenza   Vaccine 0.5 milliLiter(s) IntraMuscular once  insulin lispro (HumaLOG) corrective regimen sliding scale   SubCutaneous every 4 hours  magnesium sulfate  IVPB 2 Gram(s) IV Intermittent once  metoprolol tartrate 25 milliGRAM(s) Oral daily  pantoprazole  Injectable 40 milliGRAM(s) IV Push daily  piperacillin/tazobactam IVPB.. 3.375 Gram(s) IV Intermittent every 8 hours  predniSONE   Tablet 50 milliGRAM(s) Oral every 12 hours  tamsulosin 0.4 milliGRAM(s) Oral at bedtime  vancomycin  IVPB 1000 milliGRAM(s) IV Intermittent every 12 hours    MEDICATIONS  (PRN):  acetaminophen   Tablet .. 650 milliGRAM(s) Oral every 6 hours PRN Temp greater or equal to 38C (100.4F), Mild Pain (1 - 3)  dextrose 40% Gel 15 Gram(s) Oral once PRN Blood Glucose LESS THAN 70 milliGRAM(s)/deciliter  glucagon  Injectable 1 milliGRAM(s) IntraMuscular once PRN Glucose LESS THAN 70 milligrams/deciliter      REVIEW OF SYSTEMS:  eye, ent, GI, , allergic, dermatologic, musculoskeletal and neurologic are negative except as described above    Vital Signs Last 24 Hrs  T(C): 36.4 (17 Oct 2019 08:00), Max: 36.9 (16 Oct 2019 20:08)  T(F): 97.5 (17 Oct 2019 08:00), Max: 98.5 (16 Oct 2019 20:08)  HR: 111 (17 Oct 2019 10:00) (68 - 111)  BP: 115/58 (17 Oct 2019 09:00) (99/56 - 133/63)  BP(mean): 77 (17 Oct 2019 09:00) (72 - 90)  RR: 24 (17 Oct 2019 10:00) (15 - 46)  SpO2: 99% (17 Oct 2019 10:00) (99% - 100%)    I&O's Summary    16 Oct 2019 07:01  -  17 Oct 2019 07:00  --------------------------------------------------------  IN: 2980 mL / OUT: 2320 mL / NET: 660 mL    17 Oct 2019 07:01  -  17 Oct 2019 10:06  --------------------------------------------------------  IN: 420 mL / OUT: 500 mL / NET: -80 mL        Telemetry past 24h: st->sr, no further af    PHYSICAL EXAM:    Constitutional: well-nourished, well-developed, NAD   HEENT:  MMM, sclerae anicteric, conjunctivae clear, no oral cyanosis.  Pulmonary: Non-labored, breath sounds are decr bilaterally, mild wheezing   Cardiovascular: Regular, S1 and S2.  No murmur.  No rubs, gallops or clicks  Gastrointestinal: Bowel Sounds present, soft, nontender.   Lymph: No peripheral edema.   Neurological: Alert, no focal deficits  Skin: No rashes.  Psych:  Mood & affect appropriate    LABS: All Labs Reviewed:                        9.5    11.62 )-----------( 219      ( 17 Oct 2019 04:45 )             31.0                         12.4   13.73 )-----------( 317      ( 16 Oct 2019 01:35 )             40.5     17 Oct 2019 04:45    140    |  103    |  18     ----------------------------<  170    4.0     |  34     |  1.20   16 Oct 2019 01:35    142    |  104    |  15     ----------------------------<  153    4.3     |  30     |  1.20     Ca    8.5        17 Oct 2019 04:45  Ca    9.9        16 Oct 2019 01:35  Phos  3.2       17 Oct 2019 04:45  Mg     1.7       17 Oct 2019 04:45    TPro  5.4    /  Alb  2.9    /  TBili  0.3    /  DBili  <.10   /  AST  14     /  ALT  28     /  AlkPhos  63     17 Oct 2019 04:45  TPro  7.2    /  Alb  4.0    /  TBili  0.4    /  DBili  x      /  AST  23     /  ALT  36     /  AlkPhos  90     16 Oct 2019 01:35      CARDIAC MARKERS ( 16 Oct 2019 17:24 )  .021 ng/mL / x     / x     / x     / x      CARDIAC MARKERS ( 16 Oct 2019 08:23 )  .021 ng/mL / x     / x     / x     / x      CARDIAC MARKERS ( 16 Oct 2019 01:35 )  <.015 ng/mL / x     / 87 U/L / x     / 3.9 ng/mL      Blood Culture: Organism --  Gram Stain Blood -- Gram Stain --  Specimen Source .Blood Blood-Peripheral  Culture-Blood --      10-16 @ 01:35  Pro Bnp 115        RADIOLOGY:    EKG:    Echo:

## 2019-10-17 NOTE — PROGRESS NOTE ADULT - SUBJECTIVE AND OBJECTIVE BOX
Pulmonary/Critical Care Followup    PAST MEDICAL & SURGICAL HISTORY:  PVD (peripheral vascular disease)  Hypertension  COPD (chronic obstructive pulmonary disease): on 2L at home and BiPAP at night; intubated 6/18  Osteomyelitis  Dyslipidemia  CAD (Coronary Artery Disease): s/p 3v CABG 2004  Diabetes Mellitus, Type II  S/P primary angioplasty with coronary stent  Compound fracture: left leg  CABG (Coronary Artery Bypass Graft): 2004      Allergies    No Known Allergies    Intolerances    shellfish (Nausea)      FAMILY HISTORY:  Family history of diabetes mellitus (Sibling)      SOCIAL HISTORY:      MEDICATIONS  (STANDING):  ALBUTerol/ipratropium for Nebulization 3 milliLiter(s) Nebulizer every 6 hours  aspirin enteric coated 81 milliGRAM(s) Oral daily  atorvastatin 40 milliGRAM(s) Oral at bedtime  buDESOnide    Inhalation Suspension 0.5 milliGRAM(s) Inhalation two times a day  clopidogrel Tablet 75 milliGRAM(s) Oral daily  dextrose 5%. 1000 milliLiter(s) (50 mL/Hr) IV Continuous <Continuous>  dextrose 50% Injectable 12.5 Gram(s) IV Push once  dextrose 50% Injectable 25 Gram(s) IV Push once  dextrose 50% Injectable 25 Gram(s) IV Push once  guaiFENesin  milliGRAM(s) Oral every 12 hours  heparin  Injectable 5000 Unit(s) SubCutaneous every 8 hours  influenza   Vaccine 0.5 milliLiter(s) IntraMuscular once  insulin glargine Injectable (LANTUS) 10 Unit(s) SubCutaneous at bedtime  insulin lispro (HumaLOG) corrective regimen sliding scale   SubCutaneous every 4 hours  metoprolol tartrate 12.5 milliGRAM(s) Oral two times a day  pantoprazole  Injectable 40 milliGRAM(s) IV Push daily  piperacillin/tazobactam IVPB.. 3.375 Gram(s) IV Intermittent every 8 hours  predniSONE   Tablet 50 milliGRAM(s) Oral every 12 hours  tamsulosin 0.4 milliGRAM(s) Oral at bedtime  vancomycin  IVPB 1000 milliGRAM(s) IV Intermittent every 12 hours    MEDICATIONS  (PRN):  acetaminophen   Tablet .. 650 milliGRAM(s) Oral every 6 hours PRN Temp greater or equal to 38C (100.4F), Mild Pain (1 - 3)  dextrose 40% Gel 15 Gram(s) Oral once PRN Blood Glucose LESS THAN 70 milliGRAM(s)/deciliter  glucagon  Injectable 1 milliGRAM(s) IntraMuscular once PRN Glucose LESS THAN 70 milligrams/deciliter      Vital Signs Last 24 Hrs  T(C): 36.4 (17 Oct 2019 08:00), Max: 36.9 (16 Oct 2019 20:08)  T(F): 97.5 (17 Oct 2019 08:00), Max: 98.5 (16 Oct 2019 20:08)  HR: 111 (17 Oct 2019 10:00) (68 - 111)  BP: 119/63 (17 Oct 2019 10:00) (99/56 - 133/63)  BP(mean): 85 (17 Oct 2019 10:00) (72 - 90)  RR: 24 (17 Oct 2019 10:00) (15 - 46)  SpO2: 99% (17 Oct 2019 10:00) (99% - 100%)    LABS:                        9.5    11.62 )-----------( 219      ( 17 Oct 2019 04:45 )             31.0     10-17    140  |  103  |  18  ----------------------------<  170<H>  4.0   |  34<H>  |  1.20    Ca    8.5      17 Oct 2019 04:45  Phos  3.2     10-17  Mg     1.7     10-17    TPro  5.4<L>  /  Alb  2.9<L>  /  TBili  0.3  /  DBili  <.10  /  AST  14<L>  /  ALT  28  /  AlkPhos  63  10-17          ABG - ( 16 Oct 2019 04:51 )  pH, Arterial: 7.23  pH, Blood: x     /  pCO2: 73    /  pO2: 296   / HCO3: 25    / Base Excess: 2.3   /  SaO2: 100                 WBC:  WBC Count: 11.62 K/uL (10-17 @ 04:45)  WBC Count: 13.73 K/uL (10-16 @ 01:35)      MICROBIOLOGY:  RECENT CULTURES:  10-16 .Blood Blood-Peripheral XXXX XXXX   No growth to date.          CARDIAC MARKERS ( 16 Oct 2019 17:24 )  .021 ng/mL / x     / x     / x     / x      CARDIAC MARKERS ( 16 Oct 2019 08:23 )  .021 ng/mL / x     / x     / x     / x      CARDIAC MARKERS ( 16 Oct 2019 01:35 )  <.015 ng/mL / x     / 87 U/L / x     / 3.9 ng/mL            Sodium:  Sodium, Serum: 140 mmol/L (10-17 @ 04:45)  Sodium, Serum: 142 mmol/L (10-16 @ 01:35)      1.20 mg/dL 10-17 @ 04:45  1.20 mg/dL 10-16 @ 01:35      Hemoglobin:  Hemoglobin: 9.5 g/dL (10-17 @ 04:45)  Hemoglobin: 12.4 g/dL (10-16 @ 01:35)      Platelets: Platelet Count - Automated: 219 K/uL (10-17 @ 04:45)  Platelet Count - Automated: 317 K/uL (10-16 @ 01:35)      LIVER FUNCTIONS - ( 17 Oct 2019 04:45 )  Alb: 2.9 g/dL / Pro: 5.4 g/dL / ALK PHOS: 63 U/L / ALT: 28 U/L / AST: 14 U/L / GGT: x                 RADIOLOGY & ADDITIONAL STUDIES: Pulmonary/Critical Care Followup    PAST MEDICAL & SURGICAL HISTORY:  PVD (peripheral vascular disease)  Hypertension  COPD (chronic obstructive pulmonary disease): on 2L at home and BiPAP at night; intubated 6/18  Osteomyelitis  Dyslipidemia  CAD (Coronary Artery Disease): s/p 3v CABG 2004  Diabetes Mellitus, Type II  S/P primary angioplasty with coronary stent  Compound fracture: left leg  CABG (Coronary Artery Bypass Graft): 2004      Allergies    No Known Allergies    Intolerances    shellfish (Nausea)      FAMILY HISTORY:  Family history of diabetes mellitus (Sibling)      SOCIAL HISTORY:      MEDICATIONS  (STANDING):  ALBUTerol/ipratropium for Nebulization 3 milliLiter(s) Nebulizer every 6 hours  aspirin enteric coated 81 milliGRAM(s) Oral daily  atorvastatin 40 milliGRAM(s) Oral at bedtime  buDESOnide    Inhalation Suspension 0.5 milliGRAM(s) Inhalation two times a day  clopidogrel Tablet 75 milliGRAM(s) Oral daily  dextrose 5%. 1000 milliLiter(s) (50 mL/Hr) IV Continuous <Continuous>  dextrose 50% Injectable 12.5 Gram(s) IV Push once  dextrose 50% Injectable 25 Gram(s) IV Push once  dextrose 50% Injectable 25 Gram(s) IV Push once  guaiFENesin  milliGRAM(s) Oral every 12 hours  heparin  Injectable 5000 Unit(s) SubCutaneous every 8 hours  influenza   Vaccine 0.5 milliLiter(s) IntraMuscular once  insulin glargine Injectable (LANTUS) 10 Unit(s) SubCutaneous at bedtime  insulin lispro (HumaLOG) corrective regimen sliding scale   SubCutaneous every 4 hours  metoprolol tartrate 12.5 milliGRAM(s) Oral two times a day  pantoprazole  Injectable 40 milliGRAM(s) IV Push daily  piperacillin/tazobactam IVPB.. 3.375 Gram(s) IV Intermittent every 8 hours  predniSONE   Tablet 50 milliGRAM(s) Oral every 12 hours  tamsulosin 0.4 milliGRAM(s) Oral at bedtime  vancomycin  IVPB 1000 milliGRAM(s) IV Intermittent every 12 hours    MEDICATIONS  (PRN):  acetaminophen   Tablet .. 650 milliGRAM(s) Oral every 6 hours PRN Temp greater or equal to 38C (100.4F), Mild Pain (1 - 3)  dextrose 40% Gel 15 Gram(s) Oral once PRN Blood Glucose LESS THAN 70 milliGRAM(s)/deciliter  glucagon  Injectable 1 milliGRAM(s) IntraMuscular once PRN Glucose LESS THAN 70 milligrams/deciliter      Vital Signs Last 24 Hrs  T(C): 36.4 (17 Oct 2019 08:00), Max: 36.9 (16 Oct 2019 20:08)  T(F): 97.5 (17 Oct 2019 08:00), Max: 98.5 (16 Oct 2019 20:08)  HR: 111 (17 Oct 2019 10:00) (68 - 111)  BP: 119/63 (17 Oct 2019 10:00) (99/56 - 133/63)  BP(mean): 85 (17 Oct 2019 10:00) (72 - 90)  RR: 24 (17 Oct 2019 10:00) (15 - 46)  SpO2: 99% (17 Oct 2019 10:00) (99% - 100%)    LABS:                        9.5    11.62 )-----------( 219      ( 17 Oct 2019 04:45 )             31.0     10-17    140  |  103  |  18  ----------------------------<  170<H>  4.0   |  34<H>  |  1.20    Ca    8.5      17 Oct 2019 04:45  Phos  3.2     10-17  Mg     1.7     10-17    TPro  5.4<L>  /  Alb  2.9<L>  /  TBili  0.3  /  DBili  <.10  /  AST  14<L>  /  ALT  28  /  AlkPhos  63  10-17          ABG - ( 16 Oct 2019 04:51 )  pH, Arterial: 7.23  pH, Blood: x     /  pCO2: 73    /  pO2: 296   / HCO3: 25    / Base Excess: 2.3   /  SaO2: 100                 WBC:  WBC Count: 11.62 K/uL (10-17 @ 04:45)  WBC Count: 13.73 K/uL (10-16 @ 01:35)      MICROBIOLOGY:  RECENT CULTURES:  10-16 .Blood Blood-Peripheral XXXX XXXX   No growth to date.          CARDIAC MARKERS ( 16 Oct 2019 17:24 )  .021 ng/mL / x     / x     / x     / x      CARDIAC MARKERS ( 16 Oct 2019 08:23 )  .021 ng/mL / x     / x     / x     / x      CARDIAC MARKERS ( 16 Oct 2019 01:35 )  <.015 ng/mL / x     / 87 U/L / x     / 3.9 ng/mL            Sodium:  Sodium, Serum: 140 mmol/L (10-17 @ 04:45)  Sodium, Serum: 142 mmol/L (10-16 @ 01:35)      1.20 mg/dL 10-17 @ 04:45  1.20 mg/dL 10-16 @ 01:35      Hemoglobin:  Hemoglobin: 9.5 g/dL (10-17 @ 04:45)  Hemoglobin: 12.4 g/dL (10-16 @ 01:35)      Platelets: Platelet Count - Automated: 219 K/uL (10-17 @ 04:45)  Platelet Count - Automated: 317 K/uL (10-16 @ 01:35)      LIVER FUNCTIONS - ( 17 Oct 2019 04:45 )  Alb: 2.9 g/dL / Pro: 5.4 g/dL / ALK PHOS: 63 U/L / ALT: 28 U/L / AST: 14 U/L / GGT: x                 RADIOLOGY & ADDITIONAL STUDIES:  PATIENT [facility] YUE   VITALS/LABS         10/17/2019 afeb 84 120/58 16 100%   10/17/2019 W 11.6 Hb 9.5 Plt 219 Na 140 K 4 CO2 34 Cr 1.2   10/17/2019 2a 18/8/.4    REVIEW OF SYMPTOMS     NOTE Noteworthy changes  if any  in ROS and PE are also entered  in note  below      Able to give ROS  Yes     RELIABLE No   CONSTITUTIONAL Weakness Yes  Chills No Vision changes No  ENDOCRINE No unexplained hair loss No heat or cold intolerance    ALLERGY No hives  Sore throat No   RESP Coughing blood no  Shortness of breath YES   NEURO No Headache  Confusion Pain neck No   CARDIAC No Chest pain No Palpitations   GI No Pain abdomen NO   Vomiting NO     PHYSICAL EXAM    HEENT Unremarkable PERRLA atraumatic   RESP Fair air entry EXP prolonged    Harsh breath sound Resp distres mild   CARDIAC S1 S2 No S3     NO JVD    ABDOMEN SOFT BS PRESENT NOT DISTENDED No hepatosplenomegaly PEDAL EDEMA present No calf tenderness  NO rash   GENERAL Not TOXIC looking     PATIENT COMMOD[facility] YUE DOA 10/16/2019  PATIENT DATA   ALLERGY shellfish  WT  93             BMI    28                                                                                                                                      HEAD OF BED ELEVATION Yes   DVT PROPHYLAXIS hpsc (10/16                                 DIET     npo (10/16)                                                            RESPIRATORY GAS EXCHANGE     10/16/2019 4a 24/8/.6 723/73/296  10/16/2019 3a 18/8/.3 720/75/55                                                            HEMODYNAMICS        10/16/2019 112/50                                                 IV FLUIDS   ns 100 (10/16-10/17)                                                MICROBIO     10/17/2019 RVP n  10/16 blod culture n    ANTIBIOTICS   zosyn (10/16)  vanco 1.2 (10/16)    INDWELLING TUBES          PATIENT   COMMODORE YUE CAO  10/16/2019   Pt description                          cc SOB HO 2 stents placed at [facility] fall 2019 presented with incr sob No cp No fever      HPI PMH   79 m former smoker PMH HTN, DM2 (on home Metformin and glipizide), COPD (2L home/ BIPAP at night), CAD with 3v CABG 2004, Stent 9/2019 HLD, 10/26/2018 ECHO ef 55% hx hypercapnic resp failure with ho intubation c/b with cardiac arrest (12/2018) with ho recurrent admissions GOLD D recent admission  9/18-9/20/2019 with copd ex ac on chr hypercapnic resp failure  was evaluated and DCed  ER 10/1/2019     Pt admitted 10/16/2019 with COPD ex resp failure Pt symptomatic x 1 d had dental cleaning 10/15 a No travel No sick contacts     er vitals 177/90 133 78%

## 2019-10-17 NOTE — PROGRESS NOTE ADULT - SUBJECTIVE AND OBJECTIVE BOX
PROGRESS NOTE  Patient is a 79y old  Male who presents with a chief complaint of SOB (17 Oct 2019 12:15)  Chart and available morning labs /imaging are reviewed electronically , urgent issues addressed . More information  is being added upon completion of rounds , when more information is collected and management discussed with consultants , medical staff and social service/case management on the floor   LATE ENTRY- patient was seen and examined earlier today 11 am  . Plan of care was discussed with med staff and unit coordinator .   OVERNIGHT  Appeared comfortable, saturating well initially on NC and continued to saturate well on room air. He denies any complaints today  Patient is resting in a bed comfortably . .No distress noted   HPI:  80 yo AAM with history of recent admission with copd exacerbation in telemetry unit  , patient has hx of  COPD (On home O2 2L and BIPAP at while sleeping regardless of time of day), CAD (w/ 3v CABG 2004),s/p 2 recent coronary stensts in [facility] , HLD, DM2 (on Metformin), BPH, hx Hypercapnic Respiratory  Failure/Cardiac Arrest requiring intubation (6/18) Denies smoking/drinking/drug use  Diagnosed with COPD EXACERBATION AND ACUTE  HYPERCAPNIC AND HYPOXIC RESPIRATORY FAILURE ,placed on BIPAP , Found to have SVT .Admit to critical care unit  ,SERIAL ABGS ,XRAYS OF CHEST , intravenous steroids nebulized bronchodilators and pulmonology evaluation,O2 supply, serial labs and chest xrays , send 3 sets of cardiac enzymes to rule out coronary event, obtain ECHO to evaluate LVEF, cardiology consult ,continue current management, O2 supply, anticoagulation plan as per cardiology consult Pneumonia suspected and vancomycin and zosyn were given in ER  ,found to have lactate elevation .Admitted for septic workup and evaluation,send blood and urine cx,serial lactate levels,monitor vitals closley,ivfs hydration,monitor urine output and renal profile, (16 Oct 2019 03:48)  PAST MEDICAL & SURGICAL HISTORY:  PVD (peripheral vascular disease)  Hypertension  COPD (chronic obstructive pulmonary disease): on 2L at home and BiPAP at night; intubated 6/18  Osteomyelitis  Dyslipidemia  CAD (Coronary Artery Disease): s/p 3v CABG 2004  Diabetes Mellitus, Type II  S/P primary angioplasty with coronary stent  Compound fracture: left leg  CABG (Coronary Artery Bypass Graft): 2004  MEDICATIONS  (STANDING):  ALBUTerol/ipratropium for Nebulization 3 milliLiter(s) Nebulizer every 6 hours  aspirin enteric coated 81 milliGRAM(s) Oral daily  atorvastatin 40 milliGRAM(s) Oral at bedtime  buDESOnide    Inhalation Suspension 0.5 milliGRAM(s) Inhalation two times a day  clopidogrel Tablet 75 milliGRAM(s) Oral daily  dextrose 5%. 1000 milliLiter(s) (50 mL/Hr) IV Continuous <Continuous>  dextrose 50% Injectable 12.5 Gram(s) IV Push once  dextrose 50% Injectable 25 Gram(s) IV Push once  dextrose 50% Injectable 25 Gram(s) IV Push once  guaiFENesin  milliGRAM(s) Oral every 12 hours  heparin  Injectable 5000 Unit(s) SubCutaneous every 8 hours  influenza   Vaccine 0.5 milliLiter(s) IntraMuscular once  insulin glargine Injectable (LANTUS) 10 Unit(s) SubCutaneous <User Schedule>  insulin lispro (HumaLOG) corrective regimen sliding scale   SubCutaneous every 4 hours  metoprolol tartrate 12.5 milliGRAM(s) Oral two times a day  pantoprazole  Injectable 40 milliGRAM(s) IV Push daily  piperacillin/tazobactam IVPB.. 3.375 Gram(s) IV Intermittent every 8 hours  predniSONE   Tablet 50 milliGRAM(s) Oral every 12 hours  tamsulosin 0.4 milliGRAM(s) Oral at bedtime  vancomycin  IVPB 1000 milliGRAM(s) IV Intermittent every 12 hours    MEDICATIONS  (PRN):  acetaminophen   Tablet .. 650 milliGRAM(s) Oral every 6 hours PRN Temp greater or equal to 38C (100.4F), Mild Pain (1 - 3)  dextrose 40% Gel 15 Gram(s) Oral once PRN Blood Glucose LESS THAN 70 milliGRAM(s)/deciliter  glucagon  Injectable 1 milliGRAM(s) IntraMuscular once PRN Glucose LESS THAN 70 milligrams/deciliter      OBJECTIVE    T(C): 36.8 (10-17-19 @ 15:42), Max: 36.9 (10-16-19 @ 20:08)  HR: 84 (10-17-19 @ 15:00) (68 - 111)  BP: 120/56 (10-17-19 @ 15:00) (99/56 - 133/63)  RR: 16 (10-17-19 @ 15:00) (15 - 46)  SpO2: 100% (10-17-19 @ 15:00) (98% - 100%)  Wt(kg): --  I&O's Summary  16 Oct 2019 07:01  -  17 Oct 2019 07:00  --------------------------------------------------------  IN: 2980 mL / OUT: 2320 mL / NET: 660 mL    17 Oct 2019 07:01  -  17 Oct 2019 16:46  --------------------------------------------------------  IN: 890 mL / OUT: 1000 mL / NET: -110 mL  REVIEW OF SYSTEMS:  CONSTITUTIONAL: No fever, weight loss, or fatigue  EYES: No eye pain, visual disturbances, or discharge  ENMT:   No sinus or throat pain  NECK: No pain or stiffness  RESPIRATORY: No cough, wheezing, chills or hemoptysis; No shortness of breath  CARDIOVASCULAR: No chest pain, palpitations, dizziness, or leg swelling  GASTROINTESTINAL: No abdominal pain. No nausea, vomiting; No diarrhea or constipation. No melena or hematochezia.  GENITOURINARY: No dysuria, frequency, hematuria, or incontinence  NEUROLOGICAL: No headaches, memory loss, loss of strength, numbness, or tremors  SKIN: No itching, burning, rashes, or lesions   MUSCULOSKELETAL: No joint pain or swelling; No muscle, back, or extremity pain  PHYSICAL EXAM:  Appearance: NAD. VS past 24 hrs -as above   HEENT:   Moist oral mucosa. Conjunctiva clear b/l.   Neck : supple  Respiratory: Lungs CTAB.  Gastrointestinal:  Soft, nontender. No rebound. No rigidity. BS present	  Cardiovascular: RRR ,S1S2 present  Neurologic: Non-focal. Moving all extremities.  Extremities: No edema. No erythema. No calf tenderness.  Skin: No rashes, No ecchymoses, No cyanosis.	  wounds ,skin lesions-See skin assesment flow sheet   LABS:                      9.5    11.62 )-----------( 219      ( 17 Oct 2019 04:45 )             31.0   10-17  140  |  103  |  18  ----------------------------<  170<H>  4.0   |  34<H>  |  1.20    Ca    8.5      17 Oct 2019 04:45  Phos  3.2     10-17  Mg     1.7     10-17  TPro  5.4<L>  /  Alb  2.9<L>  /  TBili  0.3  /  DBili  <.10  /  AST  14<L>  /  ALT  28  /  AlkPhos  63  10-17  CAPILLARY BLOOD GLUCOSE  POCT Blood Glucose.: 419 mg/dL (17 Oct 2019 13:51)  POCT Blood Glucose.: 296 mg/dL (17 Oct 2019 10:39)  POCT Blood Glucose.: 192 mg/dL (17 Oct 2019 05:39)  POCT Blood Glucose.: 241 mg/dL (17 Oct 2019 01:56)  POCT Blood Glucose.: 298 mg/dL (16 Oct 2019 22:08)  POCT Blood Glucose.: 422 mg/dL (16 Oct 2019 17:31)  Culture - Blood (collected 16 Oct 2019 09:02)  Source: .Blood Blood-Peripheral  Preliminary Report (17 Oct 2019 10:00):    No growth to date.  Culture - Blood (collected 16 Oct 2019 09:02)  Source: .Blood Blood-Peripheral  Preliminary Report (17 Oct 2019 10:00):    No growth to date.  RADIOLOGY & ADDITIONAL TESTS:< from: Xray Chest 1 View AP/PA (10.16.19 @ 01:57) >  EXAM:  XR CHEST AP OR PA 1V                        PROCEDURE DATE:  10/16/2019    INTERPRETATION:  Clinical information: Shortness of breath.  Technique: Frontal view of the chest.  Comparison: Prior chest x-ray examination from 10/1/2019.  Findings: The patient is status post median sternotomy.  Small layering bilateral pleural effusions are noted. The   cardiomediastinal silhouette is normal. There are mild multilevel   degenerative changes of the thoracic spine.  IMPRESSION: Small bilateral pleural effusions.  < end of copied text >   reviewed elctronically   25 minutes spent on this visit, 50% visit time spent in care co-ordination with other attendings and counselling patient  I have discussed care plan with patient and HCP,expressed understanding of problems treatment and their effect and side effects, alternatives in detail,I have asked if they have any questions and concerns and appropriately addressed them to best of my ability

## 2019-10-17 NOTE — PROGRESS NOTE ADULT - PROBLEM SELECTOR PLAN 3
Suspected -Admitted for septic workup and evaluation,send blood and urine cx,serial lactate levels,monitor vitals closley,ivfs hydration,monitor urine output and renal profile,iv abx initiated -VANCO AND ZOSYN given in ER ,continue broad spectrum abx pending culture results, if blood cultures negative can likely dc as no evidence of PNA on US/CXR or other signs of infection at this time

## 2019-10-18 ENCOUNTER — TRANSCRIPTION ENCOUNTER (OUTPATIENT)
Age: 79
End: 2019-10-18

## 2019-10-18 VITALS — TEMPERATURE: 98 F

## 2019-10-18 LAB
ALBUMIN SERPL ELPH-MCNC: 2.9 G/DL — LOW (ref 3.3–5)
ALP SERPL-CCNC: 62 U/L — SIGNIFICANT CHANGE UP (ref 40–120)
ALT FLD-CCNC: 26 U/L — SIGNIFICANT CHANGE UP (ref 12–78)
ANION GAP SERPL CALC-SCNC: 5 MMOL/L — SIGNIFICANT CHANGE UP (ref 5–17)
AST SERPL-CCNC: 13 U/L — LOW (ref 15–37)
BASOPHILS # BLD AUTO: 0.01 K/UL — SIGNIFICANT CHANGE UP (ref 0–0.2)
BASOPHILS NFR BLD AUTO: 0.1 % — SIGNIFICANT CHANGE UP (ref 0–2)
BILIRUB SERPL-MCNC: 0.3 MG/DL — SIGNIFICANT CHANGE UP (ref 0.2–1.2)
BUN SERPL-MCNC: 20 MG/DL — SIGNIFICANT CHANGE UP (ref 7–23)
CALCIUM SERPL-MCNC: 8.5 MG/DL — SIGNIFICANT CHANGE UP (ref 8.5–10.1)
CHLORIDE SERPL-SCNC: 103 MMOL/L — SIGNIFICANT CHANGE UP (ref 96–108)
CO2 SERPL-SCNC: 33 MMOL/L — HIGH (ref 22–31)
CREAT SERPL-MCNC: 1.3 MG/DL — SIGNIFICANT CHANGE UP (ref 0.5–1.3)
EOSINOPHIL # BLD AUTO: 0 K/UL — SIGNIFICANT CHANGE UP (ref 0–0.5)
EOSINOPHIL NFR BLD AUTO: 0 % — SIGNIFICANT CHANGE UP (ref 0–6)
GLUCOSE SERPL-MCNC: 213 MG/DL — HIGH (ref 70–99)
HCT VFR BLD CALC: 32.9 % — LOW (ref 39–50)
HGB BLD-MCNC: 9.9 G/DL — LOW (ref 13–17)
IMM GRANULOCYTES NFR BLD AUTO: 0.6 % — SIGNIFICANT CHANGE UP (ref 0–1.5)
LYMPHOCYTES # BLD AUTO: 0.54 K/UL — LOW (ref 1–3.3)
LYMPHOCYTES # BLD AUTO: 5.2 % — LOW (ref 13–44)
MAGNESIUM SERPL-MCNC: 2 MG/DL — SIGNIFICANT CHANGE UP (ref 1.6–2.6)
MCHC RBC-ENTMCNC: 27 PG — SIGNIFICANT CHANGE UP (ref 27–34)
MCHC RBC-ENTMCNC: 30.1 GM/DL — LOW (ref 32–36)
MCV RBC AUTO: 89.9 FL — SIGNIFICANT CHANGE UP (ref 80–100)
MONOCYTES # BLD AUTO: 0.71 K/UL — SIGNIFICANT CHANGE UP (ref 0–0.9)
MONOCYTES NFR BLD AUTO: 6.8 % — SIGNIFICANT CHANGE UP (ref 2–14)
NEUTROPHILS # BLD AUTO: 9.15 K/UL — HIGH (ref 1.8–7.4)
NEUTROPHILS NFR BLD AUTO: 87.3 % — HIGH (ref 43–77)
NRBC # BLD: 0 /100 WBCS — SIGNIFICANT CHANGE UP (ref 0–0)
PHOSPHATE SERPL-MCNC: 3.5 MG/DL — SIGNIFICANT CHANGE UP (ref 2.5–4.5)
PLATELET # BLD AUTO: 235 K/UL — SIGNIFICANT CHANGE UP (ref 150–400)
POTASSIUM SERPL-MCNC: 4.5 MMOL/L — SIGNIFICANT CHANGE UP (ref 3.5–5.3)
POTASSIUM SERPL-SCNC: 4.5 MMOL/L — SIGNIFICANT CHANGE UP (ref 3.5–5.3)
PROCALCITONIN SERPL-MCNC: 0.06 NG/ML — HIGH (ref 0–0.04)
PROT SERPL-MCNC: 5.4 G/DL — LOW (ref 6–8.3)
RBC # BLD: 3.66 M/UL — LOW (ref 4.2–5.8)
RBC # FLD: 13.2 % — SIGNIFICANT CHANGE UP (ref 10.3–14.5)
SODIUM SERPL-SCNC: 141 MMOL/L — SIGNIFICANT CHANGE UP (ref 135–145)
WBC # BLD: 10.47 K/UL — SIGNIFICANT CHANGE UP (ref 3.8–10.5)
WBC # FLD AUTO: 10.47 K/UL — SIGNIFICANT CHANGE UP (ref 3.8–10.5)

## 2019-10-18 PROCEDURE — 80202 ASSAY OF VANCOMYCIN: CPT

## 2019-10-18 PROCEDURE — 83735 ASSAY OF MAGNESIUM: CPT

## 2019-10-18 PROCEDURE — 87581 M.PNEUMON DNA AMP PROBE: CPT

## 2019-10-18 PROCEDURE — 94640 AIRWAY INHALATION TREATMENT: CPT

## 2019-10-18 PROCEDURE — 83605 ASSAY OF LACTIC ACID: CPT

## 2019-10-18 PROCEDURE — 36415 COLL VENOUS BLD VENIPUNCTURE: CPT

## 2019-10-18 PROCEDURE — 87486 CHLMYD PNEUM DNA AMP PROBE: CPT

## 2019-10-18 PROCEDURE — 94660 CPAP INITIATION&MGMT: CPT

## 2019-10-18 PROCEDURE — 99291 CRITICAL CARE FIRST HOUR: CPT

## 2019-10-18 PROCEDURE — 93005 ELECTROCARDIOGRAM TRACING: CPT

## 2019-10-18 PROCEDURE — 90686 IIV4 VACC NO PRSV 0.5 ML IM: CPT

## 2019-10-18 PROCEDURE — 99232 SBSQ HOSP IP/OBS MODERATE 35: CPT

## 2019-10-18 PROCEDURE — 80048 BASIC METABOLIC PNL TOTAL CA: CPT

## 2019-10-18 PROCEDURE — 94760 N-INVAS EAR/PLS OXIMETRY 1: CPT

## 2019-10-18 PROCEDURE — 82550 ASSAY OF CK (CPK): CPT

## 2019-10-18 PROCEDURE — 82803 BLOOD GASES ANY COMBINATION: CPT

## 2019-10-18 PROCEDURE — 85027 COMPLETE CBC AUTOMATED: CPT

## 2019-10-18 PROCEDURE — 80053 COMPREHEN METABOLIC PANEL: CPT

## 2019-10-18 PROCEDURE — 84145 PROCALCITONIN (PCT): CPT

## 2019-10-18 PROCEDURE — 71045 X-RAY EXAM CHEST 1 VIEW: CPT

## 2019-10-18 PROCEDURE — 82553 CREATINE MB FRACTION: CPT

## 2019-10-18 PROCEDURE — 83036 HEMOGLOBIN GLYCOSYLATED A1C: CPT

## 2019-10-18 PROCEDURE — 87633 RESP VIRUS 12-25 TARGETS: CPT

## 2019-10-18 PROCEDURE — 96365 THER/PROPH/DIAG IV INF INIT: CPT

## 2019-10-18 PROCEDURE — 80076 HEPATIC FUNCTION PANEL: CPT

## 2019-10-18 PROCEDURE — 82962 GLUCOSE BLOOD TEST: CPT

## 2019-10-18 PROCEDURE — 83880 ASSAY OF NATRIURETIC PEPTIDE: CPT

## 2019-10-18 PROCEDURE — 84484 ASSAY OF TROPONIN QUANT: CPT

## 2019-10-18 PROCEDURE — 87040 BLOOD CULTURE FOR BACTERIA: CPT

## 2019-10-18 PROCEDURE — 36600 WITHDRAWAL OF ARTERIAL BLOOD: CPT

## 2019-10-18 PROCEDURE — 87798 DETECT AGENT NOS DNA AMP: CPT

## 2019-10-18 PROCEDURE — 84100 ASSAY OF PHOSPHORUS: CPT

## 2019-10-18 RX ADMIN — Medication 600 MILLIGRAM(S): at 06:17

## 2019-10-18 RX ADMIN — Medication 4: at 06:24

## 2019-10-18 RX ADMIN — Medication 81 MILLIGRAM(S): at 11:21

## 2019-10-18 RX ADMIN — PIPERACILLIN AND TAZOBACTAM 25 GRAM(S): 4; .5 INJECTION, POWDER, LYOPHILIZED, FOR SOLUTION INTRAVENOUS at 01:24

## 2019-10-18 RX ADMIN — Medication 250 MILLIGRAM(S): at 06:18

## 2019-10-18 RX ADMIN — Medication 8: at 11:21

## 2019-10-18 RX ADMIN — Medication 50 MILLIGRAM(S): at 06:17

## 2019-10-18 RX ADMIN — Medication 8: at 01:28

## 2019-10-18 RX ADMIN — Medication 3 MILLILITER(S): at 07:27

## 2019-10-18 RX ADMIN — Medication 12.5 MILLIGRAM(S): at 06:17

## 2019-10-18 RX ADMIN — PANTOPRAZOLE SODIUM 40 MILLIGRAM(S): 20 TABLET, DELAYED RELEASE ORAL at 11:21

## 2019-10-18 RX ADMIN — INFLUENZA VIRUS VACCINE 0.5 MILLILITER(S): 15; 15; 15; 15 SUSPENSION INTRAMUSCULAR at 13:33

## 2019-10-18 RX ADMIN — CLOPIDOGREL BISULFATE 75 MILLIGRAM(S): 75 TABLET, FILM COATED ORAL at 11:21

## 2019-10-18 RX ADMIN — Medication 0.5 MILLIGRAM(S): at 07:27

## 2019-10-18 RX ADMIN — HEPARIN SODIUM 5000 UNIT(S): 5000 INJECTION INTRAVENOUS; SUBCUTANEOUS at 06:17

## 2019-10-18 RX ADMIN — Medication 3 MILLILITER(S): at 01:49

## 2019-10-18 NOTE — PROGRESS NOTE ADULT - ASSESSMENT
Patient is a 79/M with PMHx significant for HTN, COPD (On home O2 2L and BIPAP at night and prn, CAD w/ stents (most recent 8/2019 at [place]), CABG, HLD, DM2 (not on insulin), hx Hypercapnic Resp Failure/Cardiac Arrest requiring intubation (2018), osteomyelitis, presented with acute SOB. Patient was admitted with acute respiratory failure likely due to COPD exacerbation. ICU consulted for acute hypercapnic hypoxemic respiratory failure likely from acute COPD exacerbation and SVT which resolved     Neuro: stable, tylenol prn for pain    Cardio: rate controlled, will switch to home metoprolol 12.5 BID. Continue lipitor, asa, plavix. Will hold home med valsartan at this time given patient's borderline BP.     Pulm; lying in bed, saturating well on room air. Per patient, thought his symptoms might've been triggered by visiting dog at home. RVP negative, blood cultures ntd, discontinue vanc and zosyn. Switched to prednisone 40mg Q12H. Patient can be discharged home on his home medications with 30 days of prednisone taper.     GI: PO consistent carb diet, continue protonix     : stable, continue flomax    ID: RVP, blood culture ntd, afebrile, nonseptic appearing, discontinue vanco and zosyn    Endo: DM, continue ISS, lantus, routine accuchecks. Patient persistently hyperglycemic likely from steroid use. Discussed with patient extensively regarding follow up with his PMD or endocrinologist after discharge to optimize his diabetic medications    Heme: heparin subQ for dvt ppx, continue asa, plavix    Dispo: discharge planning Patient is a 79/M with PMHx significant for HTN, COPD (On home O2 2L and BIPAP at night and prn, CAD w/ stents (most recent 8/2019 at [place]), CABG, HLD, DM2 (not on insulin), hx Hypercapnic Resp Failure/Cardiac Arrest requiring intubation (2018), osteomyelitis, presented with acute SOB. Patient was admitted with acute respiratory failure likely due to COPD exacerbation. ICU consulted for acute hypercapnic hypoxemic respiratory failure likely from acute COPD exacerbation and SVT which resolved     Neuro: stable, tylenol prn for pain    Cardio: rate controlled, will switch to home metoprolol 12.5 BID. Continue lipitor, asa, plavix. Will hold home med valsartan at this time given patient's borderline BP.     Pulm; lying in bed, saturating well on room air. Patient maintained O2 saturation of 99% on 2L while ambulating around the unit, but desaturates on room air while ambulating. Per patient, thought his symptoms might've been triggered by visiting dog at home. RVP negative, blood cultures ntd, discontinue vanc and zosyn. Switched to prednisone 40mg Q12H. Patient can be discharged home on his home medications with 30 days of prednisone taper.     GI: PO consistent carb diet, continue protonix     : stable, continue flomax    ID: RVP, blood culture ntd, afebrile, nonseptic appearing, discontinue vanco and zosyn    Endo: DM, continue ISS, lantus, routine accuchecks. Patient persistently hyperglycemic likely from steroid use. Discussed with patient extensively regarding follow up with his PMD or endocrinologist after discharge to optimize his diabetic medications    Heme: heparin subQ for dvt ppx, continue asa, plavix    Dispo: discharge planning Patient is a 79/M with PMHx significant for HTN, COPD (On home O2 2L and BIPAP at night and prn, CAD w/ stents (most recent 8/2019 at [place]), CABG, HLD, DM2 (not on insulin), hx Hypercapnic Resp Failure/Cardiac Arrest requiring intubation (2018), osteomyelitis, presented with acute SOB. Patient was admitted with acute respiratory failure likely due to COPD exacerbation. ICU consulted for acute hypercapnic hypoxemic respiratory failure likely from acute COPD exacerbation and SVT which resolved     Neuro: stable, tylenol prn for pain    Cardio: rate controlled, will switch to home metoprolol 12.5 BID. Continue lipitor, asa, plavix. Will hold home med valsartan at this time given patient's borderline BP.     Pulm; lying in bed, saturating well on room air. Patient maintained O2 saturation of 99% on 2L while ambulating around the unit, but desaturates on room air while ambulating. Per patient, thought his symptoms might've been triggered by visiting dog at home. RVP negative, blood cultures ntd, discontinue vanc and zosyn. Switched to prednisone 40mg Q12H. Patient can be discharged home on his home medications with 30 days of prednisone taper.     GI: PO consistent carb diet, continue protonix     : stable, continue flomax    ID: RVP, blood culture ntd, afebrile, nonseptic appearing, discontinue vanco and zosyn    Endo: DM, continue ISS, lantus, routine accuchecks. Patient persistently hyperglycemic likely from steroid use. Discussed with patient extensively regarding follow up with his PMD or endocrinologist after discharge to optimize his diabetic medications, patient stated that he takes insulin as needed at home while he is on steroids and checks his blood glucose regularly     Heme: heparin subQ for dvt ppx, continue asa, plavix    Dispo: discharge planning

## 2019-10-18 NOTE — DISCHARGE NOTE NURSING/CASE MANAGEMENT/SOCIAL WORK - PATIENT PORTAL LINK FT
You can access the FollowMyHealth Patient Portal offered by [facility] by registering at the following website: [email or web address]. By joining [employer]’s FollowMyHealth portal, you will also be able to view your health information using other applications (apps) compatible with our system.

## 2019-10-18 NOTE — PROGRESS NOTE ADULT - ASSESSMENT
79m recent admission with copd exacerbation in telemetry unit, patient has hx of  COPD (On home O2 2L and BIPAP at while sleeping regardless of time of day), CAD (w/ 3v CABG 2004),s/p 2 recent coronary stensts in [facility] , HLD, DM2 (on Metformin), BPH, hx Hypercapnic Respiratory  Failure/Cardiac Arrest requiring intubation (6/18) Denies smoking/drinking/drug use  Diagnosed with COPD EXACERBATION AND ACUTE  HYPERCAPNIC AND HYPOXIC RESPIRATORY FAILURE ,placed on BIPAP , Found to have SVT.       -no recurrent af noted  -was in sr with a tachycardic rate, which has improved now that respiratory status has improved  -cont to follow rate and rhythm  -cont bb  - no ac noting need for dapt and acute hgb drop  - Hb is lower than baseline but stable today.   -trend hgb closely    -no acute ischemia  -cont bb  -cont dapt despite hgb drop noting recent pci  -cont statin    -no vol ol    - cont pulm rx  - supplemental O2  	  -resp status remains tenuous noting multiple prior admissions for life threatening resp compromise.  needs to be watched carefully and is at risk of abrupt decompensation.  I have personally provided  35 minutes of critical care time, excluding time spent on separate procedures.

## 2019-10-18 NOTE — DISCHARGE NOTE PROVIDER - HOSPITAL COURSE
Patient is an 80yo male with pmhx of COPD (on home O2 2L, and bipap at night and prn), CAD w/ stents (most recent 8/2019), CABG x3, HLD, DM2 (not insulin dependent), hx of hypercapnic resp failure and cardiac arrest requiring intubation (2018), osteomyelitis, presented to ED with acute SOB. He was admitted with acute hypoxic/hypercarbic respiratory failure likely secondary to COPD exacerbation. He was transferred to ICU for worsening respiratory status, found to have SVT in setting of respiratory distress. He was placed on BIPAP. Patient was started on empiric antibiotic coverage with vanco and zosyn. He was started on solu-medrol, nebulizer, pulmicort treatment. Vitals improved through stay. Patient's home med valsartan was held during hospitalization due to borderline BP. RVP and blood cultures were negative to date. Noted to be hyperglycemic during hospitalization while still on the insulin sliding scale, patient was started on lantus for better glycemic control. He was symptomatically improved during his stay, was able to saturate well on room air during the day. At this time, patient is stable for discharge home with close outpatient follow up. Patient is an 80yo male with pmhx of COPD (on home O2 2L, and bipap at night and prn), CAD w/ stents (most recent 8/2019), CABG x3, HLD, DM2 (not insulin dependent), hx of hypercapnic resp failure and cardiac arrest requiring intubation (2018), osteomyelitis, presented to ED with acute SOB. He was admitted with acute hypoxic/hypercarbic respiratory failure likely secondary to COPD exacerbation. He was transferred to ICU for worsening respiratory status, found to have SVT in setting of respiratory distress. He was placed on BIPAP. Patient was started on empiric antibiotic coverage with vanco and zosyn. He was started on solu-medrol, nebulizer, pulmicort treatment. Vitals improved through stay. Patient's home med valsartan was held during hospitalization due to borderline BP. RVP and blood cultures were negative to date. Noted to be hyperglycemic during hospitalization while still on the insulin sliding scale, patient was started on lantus for better glycemic control. He was symptomatically improved during his stay, was able to saturate well on room air while sitting during the day. Patient required NC at 2L while ambulating. At this time, patient is stable for discharge home with close outpatient follow up.

## 2019-10-18 NOTE — PROGRESS NOTE ADULT - ASSESSMENT
80 yo AAM with history of recent admission with copd exacerbation in telemetry unit  , patient has hx of  COPD (On home O2 2L and BIPAP at while sleeping regardless of time of day), CAD (w/ 3v CABG 2004),s/p 2 recent coronary stensts in [facility] , HLD, DM2 (on Metformin), BPH, hx Hypercapnic Respiratory  Failure/Cardiac Arrest requiring intubation (6/18) Denies smoking/drinking/drug use  Diagnosed with COPD EXACERBATION AND ACUTE  HYPERCAPNIC AND HYPOXIC RESPIRATORY FAILURE ,placed on BIPAP , Found to have SVT .Admit to critical care unit  ,SERIAL ABGS ,XRAYS OF CHEST , intravenous steroids nebulized bronchodilators and pulmonology evaluation,O2 supply, serial labs and chest xrays , send 3 sets of cardiac enzymes to rule out coronary event, obtain ECHO to evaluate LVEF, cardiology consult ,continue current management, O2 supply, anticoagulation plan as per cardiology consult Pneumonia suspected and vancomycin and zosyn were given in ER  ,found to have lactate elevation .Admitted for septic workup and evaluation,send blood and urine cx,serial lactate levels,monitor vitals closleyallys hydration,monitor urine output and renal profile

## 2019-10-18 NOTE — PROGRESS NOTE ADULT - ASSESSMENT
Monitor abg Cont rx PATIENT  COMMODALAN CAO 10/16/2019  PULMONARY/CRITICAL CARE ASSESSMENT/RECOMMENDATIONS                    AC COMBINED HYPERCAPNIC HYPOXEMIC RESP FAILURE   Improved with bpap Monitor abg   POSSIBLE RESP TRACT INFECTION   10/16-10/17 W 13.7-11.6   10/18/2019 pc .06   Off ab  LACTICEMIA   Likely due to wob rather than sepsis   Agree with iv fluids Monitor serially   COPD ex   On bd steroids   CAD  Doubt ongoin isch Is on dapt   and  bb                     TIME SPENT Over 25 minutes aggregate care time spent on encounter; activities included   direct pt care, counseling and/or coordinating care reviewing notes, lab data/ imaging , discussion with multidisciplinary team/ pt /family. Risks, benefits, alternatives  discussed in detail.

## 2019-10-18 NOTE — PROGRESS NOTE ADULT - SUBJECTIVE AND OBJECTIVE BOX
PROGRESS NOTE  Patient is a 79y old  Male who presents with a chief complaint of SOB (18 Oct 2019 09:12)  Chart and available morning labs /imaging are reviewed electronically , urgent issues addressed . More information  is being added upon completion of rounds , when more information is collected and management discussed with consultants , medical staff and social service/case management on the floor     OVERNIGHT    No new issues reported by medical staff . All above noted Patient is resting in a bed comfortably.No distress noted Feels better ,wife is at bedside PATIENT WILL BE DISCHARGED HOME TODAY   HPI:  78 yo AAM with history of recent admission with copd exacerbation in telemetry unit  , patient has hx of  COPD (On home O2 2L and BIPAP at while sleeping regardless of time of day), CAD (w/ 3v CABG 2004),s/p 2 recent coronary stensts in [facility] , HLD, DM2 (on Metformin), BPH, hx Hypercapnic Respiratory  Failure/Cardiac Arrest requiring intubation (6/18) Denies smoking/drinking/drug use  Diagnosed with COPD EXACERBATION AND ACUTE  HYPERCAPNIC AND HYPOXIC RESPIRATORY FAILURE ,placed on BIPAP , Found to have SVT .Admit to critical care unit  ,SERIAL ABGS ,XRAYS OF CHEST , intravenous steroids nebulized bronchodilators and pulmonology evaluation,O2 supply, serial labs and chest xrays , send 3 sets of cardiac enzymes to rule out coronary event, obtain ECHO to evaluate LVEF, cardiology consult ,continue current management, O2 supply, anticoagulation plan as per cardiology consult Pneumonia suspected and vancomycin and zosyn were given in ER  ,found to have lactate elevation .Admitted for septic workup and evaluation,send blood and urine cx,serial lactate levels,monitor vitals closley,ivfs hydration,monitor urine output and renal profile, (16 Oct 2019 03:48)    PAST MEDICAL & SURGICAL HISTORY:  PVD (peripheral vascular disease)  Hypertension  COPD (chronic obstructive pulmonary disease): on 2L at home and BiPAP at night; intubated 6/18  Osteomyelitis  Dyslipidemia  CAD (Coronary Artery Disease): s/p 3v CABG 2004  Diabetes Mellitus, Type II  S/P primary angioplasty with coronary stent  Compound fracture: left leg  CABG (Coronary Artery Bypass Graft): 2004      MEDICATIONS  (STANDING):  ALBUTerol/ipratropium for Nebulization 3 milliLiter(s) Nebulizer every 6 hours  aspirin enteric coated 81 milliGRAM(s) Oral daily  atorvastatin 40 milliGRAM(s) Oral at bedtime  buDESOnide    Inhalation Suspension 0.5 milliGRAM(s) Inhalation two times a day  clopidogrel Tablet 75 milliGRAM(s) Oral daily  dextrose 5%. 1000 milliLiter(s) (50 mL/Hr) IV Continuous <Continuous>  dextrose 50% Injectable 12.5 Gram(s) IV Push once  dextrose 50% Injectable 25 Gram(s) IV Push once  dextrose 50% Injectable 25 Gram(s) IV Push once  guaiFENesin  milliGRAM(s) Oral every 12 hours  heparin  Injectable 5000 Unit(s) SubCutaneous every 8 hours  influenza   Vaccine 0.5 milliLiter(s) IntraMuscular once  insulin glargine Injectable (LANTUS) 10 Unit(s) SubCutaneous <User Schedule>  insulin lispro (HumaLOG) corrective regimen sliding scale   SubCutaneous every 4 hours  metoprolol tartrate 12.5 milliGRAM(s) Oral two times a day  pantoprazole  Injectable 40 milliGRAM(s) IV Push daily  predniSONE   Tablet 40 milliGRAM(s) Oral every 12 hours  tamsulosin 0.4 milliGRAM(s) Oral at bedtime    MEDICATIONS  (PRN):  acetaminophen   Tablet .. 650 milliGRAM(s) Oral every 6 hours PRN Temp greater or equal to 38C (100.4F), Mild Pain (1 - 3)  dextrose 40% Gel 15 Gram(s) Oral once PRN Blood Glucose LESS THAN 70 milliGRAM(s)/deciliter  glucagon  Injectable 1 milliGRAM(s) IntraMuscular once PRN Glucose LESS THAN 70 milligrams/deciliter      OBJECTIVE    T(C): 36.5 (10-18-19 @ 12:16), Max: 37 (10-18-19 @ 07:57)  HR: 80 (10-18-19 @ 12:00) (58 - 106)  BP: 126/67 (10-18-19 @ 12:00) (103/55 - 156/85)  RR: 25 (10-18-19 @ 12:00) (14 - 32)  SpO2: 97% (10-18-19 @ 12:00) (97% - 100%)  Wt(kg): --  I&O's Summary    17 Oct 2019 07:01  -  18 Oct 2019 07:00  --------------------------------------------------------  IN: 2880 mL / OUT: 2450 mL / NET: 430 mL    18 Oct 2019 07:01  -  18 Oct 2019 13:00  --------------------------------------------------------  IN: 1200 mL / OUT: 1100 mL / NET: 100 mL          REVIEW OF SYSTEMS:  CONSTITUTIONAL: No fever, weight loss, or fatigue  EYES: No eye pain, visual disturbances, or discharge  ENMT:   No sinus or throat pain  NECK: No pain or stiffness  RESPIRATORY: No cough, wheezing, chills or hemoptysis; No shortness of breath  CARDIOVASCULAR: No chest pain, palpitations, dizziness, or leg swelling  GASTROINTESTINAL: No abdominal pain. No nausea, vomiting; No diarrhea or constipation. No melena or hematochezia.  GENITOURINARY: No dysuria, frequency, hematuria, or incontinence  NEUROLOGICAL: No headaches, memory loss, loss of strength, numbness, or tremors  SKIN: No itching, burning, rashes, or lesions   MUSCULOSKELETAL: No joint pain or swelling; No muscle, back, or extremity pain    PHYSICAL EXAM:  Appearance: NAD. VS past 24 hrs -as above   HEENT:   Moist oral mucosa. Conjunctiva clear b/l.   Neck : supple  Respiratory: Lungs CTAB.  Gastrointestinal:  Soft, nontender. No rebound. No rigidity. BS present	  Cardiovascular: RRR ,S1S2 present  Neurologic: Non-focal. Moving all extremities.  Extremities: No edema. No erythema. No calf tenderness.  Skin: No rashes, No ecchymoses, No cyanosis.	  wounds ,skin lesions-See skin assesment flow sheet   LABS:                        9.9    10.47 )-----------( 235      ( 18 Oct 2019 05:19 )             32.9     10-18    141  |  103  |  20  ----------------------------<  213<H>  4.5   |  33<H>  |  1.30    Ca    8.5      18 Oct 2019 05:19  Phos  3.5     10-18  Mg     2.0     10-18    TPro  5.4<L>  /  Alb  2.9<L>  /  TBili  0.3  /  DBili  x   /  AST  13<L>  /  ALT  26  /  AlkPhos  62  10-18    CAPILLARY BLOOD GLUCOSE      POCT Blood Glucose.: 317 mg/dL (18 Oct 2019 11:12)  POCT Blood Glucose.: 216 mg/dL (18 Oct 2019 06:23)  POCT Blood Glucose.: 316 mg/dL (18 Oct 2019 01:28)  POCT Blood Glucose.: 214 mg/dL (17 Oct 2019 21:38)  POCT Blood Glucose.: 236 mg/dL (17 Oct 2019 17:38)  POCT Blood Glucose.: 419 mg/dL (17 Oct 2019 13:51)          Culture - Blood (collected 16 Oct 2019 09:02)  Source: .Blood Blood-Peripheral  Preliminary Report (17 Oct 2019 10:00):    No growth to date.    Culture - Blood (collected 16 Oct 2019 09:02)  Source: .Blood Blood-Peripheral  Preliminary Report (17 Oct 2019 10:00):    No growth to date.      RADIOLOGY & ADDITIONAL TESTS:   reviewed elctronically  25 minutes spent on this visit, 50% visit time spent in care co-ordination with other attendings and counselling patient  I have discussed care plan with patient and HCP,expressed understanding of problems treatment and their effect and side effects, alternatives in detail,I have asked if they have any questions and concerns and appropriately addressed them to best of my ability

## 2019-10-18 NOTE — DISCHARGE NOTE PROVIDER - CARE PROVIDER_API CALL
Arun Dejesus)  Critical Care Medicine; Internal Medicine; Pulmonary Disease  [address] [place] [address]  Phone: [phone]  Fax: [phone]  Follow Up Time:     Dr. Rod,   Cardiology  Phone: (   )    -  Fax: (   )    -  Follow Up Time: Arun Dejesus)  Critical Care Medicine; Internal Medicine; Pulmonary Disease  [address] [place] [address]  Phone: [phone]  Fax: [phone]  Follow Up Time:     [place])  [place] [address]  Phone: [phone]  Fax: [phone]  Follow Up Time:     Dr. Rod,   Cardiology  Phone: (   )    -  Fax: (   )    -  Follow Up Time:

## 2019-10-18 NOTE — DISCHARGE NOTE PROVIDER - PROVIDER TOKENS
PROVIDER:[TOKEN:[[id number]]],FREE:[LAST:[Dr. Rod],PHONE:[(   )    -],FAX:[(   )    -],ADDRESS:[Cardiology]] PROVIDER:[TOKEN:[[id number]]],PROVIDER:[TOKEN:[[id number]]],FREE:[LAST:[Dr. Rod],PHONE:[(   )    -],FAX:[(   )    -],ADDRESS:[Cardiology]]

## 2019-10-18 NOTE — PROGRESS NOTE ADULT - PROVIDER SPECIALTY LIST ADULT
Cardiology
Cardiology
Critical Care
Hospitalist
Hospitalist
Pulmonology
Pulmonology
Critical Care

## 2019-10-18 NOTE — PROGRESS NOTE ADULT - SUBJECTIVE AND OBJECTIVE BOX
[facility] -- Saud Aguilar, Génesis Louise, Carroll Haney Savella  Office # [phone]      Follow Up:  resp failure, COPD, CAD, PAF    Subjective/Observations: Patient seen and examined. Events noted. Resting comfortably in bed. No complaints of chest pain or palpitations reported. No signs of orthopnea or PND. Dyspnea improving.       REVIEW OF SYSTEMS: All other review of systems is negative unless indicated above    PAST MEDICAL & SURGICAL HISTORY:  PVD (peripheral vascular disease)  Hypertension  COPD (chronic obstructive pulmonary disease): on 2L at home and BiPAP at night; intubated 6/18  Osteomyelitis  Dyslipidemia  CAD (Coronary Artery Disease): s/p 3v CABG 2004  Diabetes Mellitus, Type II  S/P primary angioplasty with coronary stent  Compound fracture: left leg  CABG (Coronary Artery Bypass Graft): 2004      MEDICATIONS  (STANDING):  ALBUTerol/ipratropium for Nebulization 3 milliLiter(s) Nebulizer every 6 hours  aspirin enteric coated 81 milliGRAM(s) Oral daily  atorvastatin 40 milliGRAM(s) Oral at bedtime  buDESOnide    Inhalation Suspension 0.5 milliGRAM(s) Inhalation two times a day  clopidogrel Tablet 75 milliGRAM(s) Oral daily  dextrose 5%. 1000 milliLiter(s) (50 mL/Hr) IV Continuous <Continuous>  dextrose 50% Injectable 12.5 Gram(s) IV Push once  dextrose 50% Injectable 25 Gram(s) IV Push once  dextrose 50% Injectable 25 Gram(s) IV Push once  guaiFENesin  milliGRAM(s) Oral every 12 hours  heparin  Injectable 5000 Unit(s) SubCutaneous every 8 hours  influenza   Vaccine 0.5 milliLiter(s) IntraMuscular once  insulin glargine Injectable (LANTUS) 10 Unit(s) SubCutaneous <User Schedule>  insulin lispro (HumaLOG) corrective regimen sliding scale   SubCutaneous every 4 hours  metoprolol tartrate 12.5 milliGRAM(s) Oral two times a day  pantoprazole  Injectable 40 milliGRAM(s) IV Push daily  piperacillin/tazobactam IVPB.. 3.375 Gram(s) IV Intermittent every 8 hours  predniSONE   Tablet 40 milliGRAM(s) Oral every 12 hours  tamsulosin 0.4 milliGRAM(s) Oral at bedtime  vancomycin  IVPB 1000 milliGRAM(s) IV Intermittent every 12 hours    MEDICATIONS  (PRN):  acetaminophen   Tablet .. 650 milliGRAM(s) Oral every 6 hours PRN Temp greater or equal to 38C (100.4F), Mild Pain (1 - 3)  dextrose 40% Gel 15 Gram(s) Oral once PRN Blood Glucose LESS THAN 70 milliGRAM(s)/deciliter  glucagon  Injectable 1 milliGRAM(s) IntraMuscular once PRN Glucose LESS THAN 70 milligrams/deciliter      Allergies    No Known Allergies    Intolerances    shellfish (Nausea)          Vital Signs Last 24 Hrs  T(C): 36.4 (18 Oct 2019 04:15), Max: 36.9 (17 Oct 2019 20:30)  T(F): 97.6 (18 Oct 2019 04:15), Max: 98.5 (17 Oct 2019 20:30)  HR: 82 (18 Oct 2019 07:30) (58 - 111)  BP: 111/57 (18 Oct 2019 07:00) (103/55 - 156/85)  BP(mean): 79 (18 Oct 2019 07:00) (74 - 111)  RR: 20 (18 Oct 2019 07:30) (14 - 32)  SpO2: 100% (18 Oct 2019 07:30) (98% - 100%)    I&O's Summary    17 Oct 2019 07:01  -  18 Oct 2019 07:00  --------------------------------------------------------  IN: 2880 mL / OUT: 2450 mL / NET: 430 mL          PHYSICAL EXAM:  TELE: SR  Constitutional: NAD, awake    HEENT: Moist Mucous Membranes, Anicteric  Pulmonary: Decreased breath sounds b/l. No rales, crackles appreciated. diffuse wheeze  Cardiovascular: Regular, S1 and S2, No murmurs, rubs, gallops or clicks  Gastrointestinal: Bowel Sounds present, soft, nontender.   Lymph: No peripheral edema. No lymphadenopathy.  Skin: No visible rashes or ulcers.  Psych:  Mood & affect appropriate for situation    LABS: All Labs Reviewed:                        9.9    10.47 )-----------( 235      ( 18 Oct 2019 05:19 )             32.9                         9.5    11.62 )-----------( 219      ( 17 Oct 2019 04:45 )             31.0                         12.4   13.73 )-----------( 317      ( 16 Oct 2019 01:35 )             40.5     18 Oct 2019 05:19    141    |  103    |  20     ----------------------------<  213    4.5     |  33     |  1.30   17 Oct 2019 04:45    140    |  103    |  18     ----------------------------<  170    4.0     |  34     |  1.20   16 Oct 2019 01:35    142    |  104    |  15     ----------------------------<  153    4.3     |  30     |  1.20     Ca    8.5        18 Oct 2019 05:19  Ca    8.5        17 Oct 2019 04:45  Ca    9.9        16 Oct 2019 01:35  Phos  3.5       18 Oct 2019 05:19  Phos  3.2       17 Oct 2019 04:45  Mg     2.0       18 Oct 2019 05:19  Mg     1.7       17 Oct 2019 04:45    TPro  5.4    /  Alb  2.9    /  TBili  0.3    /  DBili  x      /  AST  13     /  ALT  26     /  AlkPhos  62     18 Oct 2019 05:19  TPro  5.4    /  Alb  2.9    /  TBili  0.3    /  DBili  <.10   /  AST  14     /  ALT  28     /  AlkPhos  63     17 Oct 2019 04:45  TPro  7.2    /  Alb  4.0    /  TBili  0.4    /  DBili  x      /  AST  23     /  ALT  36     /  AlkPhos  90     16 Oct 2019 01:35      CARDIAC MARKERS ( 16 Oct 2019 17:24 )  .021 ng/mL / x     / x     / x     / x      CARDIAC MARKERS ( 16 Oct 2019 08:23 )  .021 ng/mL / x     / x     / x     / x

## 2019-10-18 NOTE — DISCHARGE NOTE PROVIDER - CARE PROVIDERS DIRECT ADDRESSES
,[email or web address]

## 2019-10-18 NOTE — PROGRESS NOTE ADULT - SUBJECTIVE AND OBJECTIVE BOX
Pulmonary/Critical Care Followup    PAST MEDICAL & SURGICAL HISTORY:  PVD (peripheral vascular disease)  Hypertension  COPD (chronic obstructive pulmonary disease): on 2L at home and BiPAP at night; intubated 6/18  Osteomyelitis  Dyslipidemia  CAD (Coronary Artery Disease): s/p 3v CABG 2004  Diabetes Mellitus, Type II  S/P primary angioplasty with coronary stent  Compound fracture: left leg  CABG (Coronary Artery Bypass Graft): 2004      Allergies    No Known Allergies    Intolerances    shellfish (Nausea)      FAMILY HISTORY:  Family history of diabetes mellitus (Sibling)      SOCIAL HISTORY:      MEDICATIONS  (STANDING):  ALBUTerol/ipratropium for Nebulization 3 milliLiter(s) Nebulizer every 6 hours  aspirin enteric coated 81 milliGRAM(s) Oral daily  atorvastatin 40 milliGRAM(s) Oral at bedtime  buDESOnide    Inhalation Suspension 0.5 milliGRAM(s) Inhalation two times a day  clopidogrel Tablet 75 milliGRAM(s) Oral daily  dextrose 5%. 1000 milliLiter(s) (50 mL/Hr) IV Continuous <Continuous>  dextrose 50% Injectable 12.5 Gram(s) IV Push once  dextrose 50% Injectable 25 Gram(s) IV Push once  dextrose 50% Injectable 25 Gram(s) IV Push once  guaiFENesin  milliGRAM(s) Oral every 12 hours  heparin  Injectable 5000 Unit(s) SubCutaneous every 8 hours  influenza   Vaccine 0.5 milliLiter(s) IntraMuscular once  insulin glargine Injectable (LANTUS) 10 Unit(s) SubCutaneous <User Schedule>  insulin lispro (HumaLOG) corrective regimen sliding scale   SubCutaneous every 4 hours  metoprolol tartrate 12.5 milliGRAM(s) Oral two times a day  pantoprazole  Injectable 40 milliGRAM(s) IV Push daily  piperacillin/tazobactam IVPB.. 3.375 Gram(s) IV Intermittent every 8 hours  predniSONE   Tablet 40 milliGRAM(s) Oral every 12 hours  tamsulosin 0.4 milliGRAM(s) Oral at bedtime  vancomycin  IVPB 1000 milliGRAM(s) IV Intermittent every 12 hours    MEDICATIONS  (PRN):  acetaminophen   Tablet .. 650 milliGRAM(s) Oral every 6 hours PRN Temp greater or equal to 38C (100.4F), Mild Pain (1 - 3)  dextrose 40% Gel 15 Gram(s) Oral once PRN Blood Glucose LESS THAN 70 milliGRAM(s)/deciliter  glucagon  Injectable 1 milliGRAM(s) IntraMuscular once PRN Glucose LESS THAN 70 milligrams/deciliter      Vital Signs Last 24 Hrs  T(C): 37 (18 Oct 2019 07:57), Max: 37 (18 Oct 2019 07:57)  T(F): 98.6 (18 Oct 2019 07:57), Max: 98.6 (18 Oct 2019 07:57)  HR: 82 (18 Oct 2019 07:30) (58 - 111)  BP: 111/57 (18 Oct 2019 07:00) (103/55 - 156/85)  BP(mean): 79 (18 Oct 2019 07:00) (74 - 111)  RR: 20 (18 Oct 2019 07:30) (14 - 32)  SpO2: 100% (18 Oct 2019 07:30) (98% - 100%)    LABS:                        9.9    10.47 )-----------( 235      ( 18 Oct 2019 05:19 )             32.9     10-18    141  |  103  |  20  ----------------------------<  213<H>  4.5   |  33<H>  |  1.30    Ca    8.5      18 Oct 2019 05:19  Phos  3.5     10-18  Mg     2.0     10-18    TPro  5.4<L>  /  Alb  2.9<L>  /  TBili  0.3  /  DBili  x   /  AST  13<L>  /  ALT  26  /  AlkPhos  62  10-18              WBC:  WBC Count: 10.47 K/uL (10-18 @ 05:19)  WBC Count: 11.62 K/uL (10-17 @ 04:45)  WBC Count: 13.73 K/uL (10-16 @ 01:35)      MICROBIOLOGY:  RECENT CULTURES:  10-16 .Blood Blood-Peripheral XXXX XXXX   No growth to date.          CARDIAC MARKERS ( 16 Oct 2019 17:24 )  .021 ng/mL / x     / x     / x     / x                Sodium:  Sodium, Serum: 141 mmol/L (10-18 @ 05:19)  Sodium, Serum: 140 mmol/L (10-17 @ 04:45)  Sodium, Serum: 142 mmol/L (10-16 @ 01:35)      1.30 mg/dL 10-18 @ 05:19  1.20 mg/dL 10-17 @ 04:45  1.20 mg/dL 10-16 @ 01:35      Hemoglobin:  Hemoglobin: 9.9 g/dL (10-18 @ 05:19)  Hemoglobin: 9.5 g/dL (10-17 @ 04:45)  Hemoglobin: 12.4 g/dL (10-16 @ 01:35)      Platelets: Platelet Count - Automated: 235 K/uL (10-18 @ 05:19)  Platelet Count - Automated: 219 K/uL (10-17 @ 04:45)  Platelet Count - Automated: 317 K/uL (10-16 @ 01:35)      LIVER FUNCTIONS - ( 18 Oct 2019 05:19 )  Alb: 2.9 g/dL / Pro: 5.4 g/dL / ALK PHOS: 62 U/L / ALT: 26 U/L / AST: 13 U/L / GGT: x                 RADIOLOGY & ADDITIONAL STUDIES: Pulmonary/Critical Care Followup    PAST MEDICAL & SURGICAL HISTORY:  PVD (peripheral vascular disease)  Hypertension  COPD (chronic obstructive pulmonary disease): on 2L at home and BiPAP at night; intubated 6/18  Osteomyelitis  Dyslipidemia  CAD (Coronary Artery Disease): s/p 3v CABG 2004  Diabetes Mellitus, Type II  S/P primary angioplasty with coronary stent  Compound fracture: left leg  CABG (Coronary Artery Bypass Graft): 2004      Allergies    No Known Allergies    Intolerances    shellfish (Nausea)      FAMILY HISTORY:  Family history of diabetes mellitus (Sibling)      SOCIAL HISTORY:      MEDICATIONS  (STANDING):  ALBUTerol/ipratropium for Nebulization 3 milliLiter(s) Nebulizer every 6 hours  aspirin enteric coated 81 milliGRAM(s) Oral daily  atorvastatin 40 milliGRAM(s) Oral at bedtime  buDESOnide    Inhalation Suspension 0.5 milliGRAM(s) Inhalation two times a day  clopidogrel Tablet 75 milliGRAM(s) Oral daily  dextrose 5%. 1000 milliLiter(s) (50 mL/Hr) IV Continuous <Continuous>  dextrose 50% Injectable 12.5 Gram(s) IV Push once  dextrose 50% Injectable 25 Gram(s) IV Push once  dextrose 50% Injectable 25 Gram(s) IV Push once  guaiFENesin  milliGRAM(s) Oral every 12 hours  heparin  Injectable 5000 Unit(s) SubCutaneous every 8 hours  influenza   Vaccine 0.5 milliLiter(s) IntraMuscular once  insulin glargine Injectable (LANTUS) 10 Unit(s) SubCutaneous <User Schedule>  insulin lispro (HumaLOG) corrective regimen sliding scale   SubCutaneous every 4 hours  metoprolol tartrate 12.5 milliGRAM(s) Oral two times a day  pantoprazole  Injectable 40 milliGRAM(s) IV Push daily  piperacillin/tazobactam IVPB.. 3.375 Gram(s) IV Intermittent every 8 hours  predniSONE   Tablet 40 milliGRAM(s) Oral every 12 hours  tamsulosin 0.4 milliGRAM(s) Oral at bedtime  vancomycin  IVPB 1000 milliGRAM(s) IV Intermittent every 12 hours    MEDICATIONS  (PRN):  acetaminophen   Tablet .. 650 milliGRAM(s) Oral every 6 hours PRN Temp greater or equal to 38C (100.4F), Mild Pain (1 - 3)  dextrose 40% Gel 15 Gram(s) Oral once PRN Blood Glucose LESS THAN 70 milliGRAM(s)/deciliter  glucagon  Injectable 1 milliGRAM(s) IntraMuscular once PRN Glucose LESS THAN 70 milligrams/deciliter      Vital Signs Last 24 Hrs  T(C): 37 (18 Oct 2019 07:57), Max: 37 (18 Oct 2019 07:57)  T(F): 98.6 (18 Oct 2019 07:57), Max: 98.6 (18 Oct 2019 07:57)  HR: 82 (18 Oct 2019 07:30) (58 - 111)  BP: 111/57 (18 Oct 2019 07:00) (103/55 - 156/85)  BP(mean): 79 (18 Oct 2019 07:00) (74 - 111)  RR: 20 (18 Oct 2019 07:30) (14 - 32)  SpO2: 100% (18 Oct 2019 07:30) (98% - 100%)    LABS:                        9.9    10.47 )-----------( 235      ( 18 Oct 2019 05:19 )             32.9     10-18    141  |  103  |  20  ----------------------------<  213<H>  4.5   |  33<H>  |  1.30    Ca    8.5      18 Oct 2019 05:19  Phos  3.5     10-18  Mg     2.0     10-18    TPro  5.4<L>  /  Alb  2.9<L>  /  TBili  0.3  /  DBili  x   /  AST  13<L>  /  ALT  26  /  AlkPhos  62  10-18              WBC:  WBC Count: 10.47 K/uL (10-18 @ 05:19)  WBC Count: 11.62 K/uL (10-17 @ 04:45)  WBC Count: 13.73 K/uL (10-16 @ 01:35)      MICROBIOLOGY:  RECENT CULTURES:  10-16 .Blood Blood-Peripheral XXXX XXXX   No growth to date.          CARDIAC MARKERS ( 16 Oct 2019 17:24 )  .021 ng/mL / x     / x     / x     / x                Sodium:  Sodium, Serum: 141 mmol/L (10-18 @ 05:19)  Sodium, Serum: 140 mmol/L (10-17 @ 04:45)  Sodium, Serum: 142 mmol/L (10-16 @ 01:35)      1.30 mg/dL 10-18 @ 05:19  1.20 mg/dL 10-17 @ 04:45  1.20 mg/dL 10-16 @ 01:35      Hemoglobin:  Hemoglobin: 9.9 g/dL (10-18 @ 05:19)  Hemoglobin: 9.5 g/dL (10-17 @ 04:45)  Hemoglobin: 12.4 g/dL (10-16 @ 01:35)      Platelets: Platelet Count - Automated: 235 K/uL (10-18 @ 05:19)  Platelet Count - Automated: 219 K/uL (10-17 @ 04:45)  Platelet Count - Automated: 317 K/uL (10-16 @ 01:35)      LIVER FUNCTIONS - ( 18 Oct 2019 05:19 )  Alb: 2.9 g/dL / Pro: 5.4 g/dL / ALK PHOS: 62 U/L / ALT: 26 U/L / AST: 13 U/L / GGT: x                 RADIOLOGY & ADDITIONAL STUDIES:  PATIENT COMMOD[facility] YUE   VITALS/LABS         10/18/2019  80 120/60 97%   10/18/2019W 10.4 Hb 9.9 Plt 235 Na 141 K 4.5 CO2 33 Cr 1.3     REVIEW OF SYMPTOMS     NOTE Noteworthy changes  if any  in ROS and PE are also entered  in note  below      Able to give ROS  Yes     RELIABLE No   CONSTITUTIONAL Weakness Yes  Chills No Vision changes No  ENDOCRINE No unexplained hair loss No heat or cold intolerance    ALLERGY No hives  Sore throat No   RESP Coughing blood no  Shortness of breath YES   NEURO No Headache  Confusion Pain neck No   CARDIAC No Chest pain No Palpitations   GI No Pain abdomen NO   Vomiting NO     PHYSICAL EXAM    HEENT Unremarkable PERRLA atraumatic   RESP Fair air entry EXP prolonged    Harsh breath sound Resp distres mild   CARDIAC S1 S2 No S3     NO JVD    ABDOMEN SOFT BS PRESENT NOT DISTENDED No hepatosplenomegaly PEDAL EDEMA present No calf tenderness  NO rash   GENERAL Not TOXIC looking     PATIENT COMMOD[facility]UE DOA 10/16/2019  PATIENT DATA   ALLERGY shellfish  WT  93             BMI    28                                                                                                                                      HEAD OF BED ELEVATION Yes   DVT PROPHYLAXIS hpsc (10/16                                 DIET     npo (10/16)                                                            RESPIRATORY GAS EXCHANGE     10/16/2019 4a 24/8/.6 723/73/296  10/16/2019 3a 18/8/.3 720/75/55                                                            HEMODYNAMICS        10/16/2019 112/50                                                 IV FLUIDS   ns 100 (10/16-10/17)                                                MICROBIO     10/17/2019 RVP n  10/16 blod culture n    ANTIBIOTICS   zosyn (10/16-10/18)  vanco 1.2 (10/16-10/18)    INDWELLING TUBES        PATIENT   COMMODORE YUE DOA  10/16/2019   Pt description                          cc SOB HO 2 stents placed at [place] fall 2019 presented with incr sob No cp No fever      HPI PMH   79 m former smoker PMH HTN, DM2 (on home Metformin and glipizide), COPD (2L home/ BIPAP at night), CAD with 3v CABG 2004, Stent 9/2019 HLD, 10/26/2018 ECHO ef 55% hx hypercapnic resp failure with ho intubation c/b with cardiac arrest (12/2018) with ho recurrent admissions GOLD D recent admission  9/18-9/20/2019 with copd ex ac on chr hypercapnic resp failure  was evaluated and DCed  ER 10/1/2019     Pt admitted 10/16/2019 with COPD ex resp failure Pt symptomatic x 1 d had dental cleaning 10/15 a No travel No sick contacts     er vitals 177/90 133 78%

## 2019-10-18 NOTE — PROGRESS NOTE ADULT - SUBJECTIVE AND OBJECTIVE BOX
24 hour events: Patient seen and examined, lying in bed comfortably. Says "I'm ready to go home". saturates well on room air.     Review of Systems:  Constitutional: No fever, chills  Neuro: No headache, numbness, weakness  Resp: denies any sob, wheezing, difficulty breathing   CVS: chronic b/l LE swelling, No chest pain, palpitations  GI: No abdominal pain, nausea, vomiting, diarrhea   : No dysuria, frequency, incontinence  Psych: No depression, anxiety, mood swings      ICU Vital Signs Last 24 Hrs  T(C): 37 (18 Oct 2019 07:57), Max: 37 (18 Oct 2019 07:57)  T(F): 98.6 (18 Oct 2019 07:57), Max: 98.6 (18 Oct 2019 07:57)  HR: 82 (18 Oct 2019 07:30) (58 - 111)  BP: 111/57 (18 Oct 2019 07:00) (103/55 - 156/85)  BP(mean): 79 (18 Oct 2019 07:00) (74 - 111)  ABP: --  ABP(mean): --  RR: 20 (18 Oct 2019 07:30) (14 - 32)  SpO2: 100% (18 Oct 2019 07:30) (98% - 100%)          CAPILLARY BLOOD GLUCOSE      POCT Blood Glucose.: 216 mg/dL (18 Oct 2019 06:23)  POCT Blood Glucose.: 316 mg/dL (18 Oct 2019 01:28)  POCT Blood Glucose.: 214 mg/dL (17 Oct 2019 21:38)  POCT Blood Glucose.: 236 mg/dL (17 Oct 2019 17:38)  POCT Blood Glucose.: 419 mg/dL (17 Oct 2019 13:51)  POCT Blood Glucose.: 296 mg/dL (17 Oct 2019 10:39)        I&O's Summary    17 Oct 2019 07:01  -  18 Oct 2019 07:00  --------------------------------------------------------  IN: 2880 mL / OUT: 2450 mL / NET: 430 mL          Physical Exam:   Gen: lying in bed, awake, appeared comfortable, NAD  Neuro: AAOx3, answering all questions appropriately, no focal deficits  HEENT: NCAT, PERRL, no JVD  Resp: saturating well on room air. scattered wheezing b/l  CVS: RRR, S1, S2  Abd: soft, nontender, nondistended, bsx4  Ext: trace edema b/l LE  Skin: warm, well perfused       MEDICATIONS  (STANDING):  ALBUTerol/ipratropium for Nebulization 3 milliLiter(s) Nebulizer every 6 hours  aspirin enteric coated 81 milliGRAM(s) Oral daily  atorvastatin 40 milliGRAM(s) Oral at bedtime  buDESOnide    Inhalation Suspension 0.5 milliGRAM(s) Inhalation two times a day  clopidogrel Tablet 75 milliGRAM(s) Oral daily  dextrose 5%. 1000 milliLiter(s) (50 mL/Hr) IV Continuous <Continuous>  dextrose 50% Injectable 12.5 Gram(s) IV Push once  dextrose 50% Injectable 25 Gram(s) IV Push once  dextrose 50% Injectable 25 Gram(s) IV Push once  guaiFENesin  milliGRAM(s) Oral every 12 hours  heparin  Injectable 5000 Unit(s) SubCutaneous every 8 hours  influenza   Vaccine 0.5 milliLiter(s) IntraMuscular once  insulin glargine Injectable (LANTUS) 10 Unit(s) SubCutaneous <User Schedule>  insulin lispro (HumaLOG) corrective regimen sliding scale   SubCutaneous every 4 hours  metoprolol tartrate 12.5 milliGRAM(s) Oral two times a day  pantoprazole  Injectable 40 milliGRAM(s) IV Push daily  predniSONE   Tablet 40 milliGRAM(s) Oral every 12 hours  tamsulosin 0.4 milliGRAM(s) Oral at bedtime    MEDICATIONS  (PRN):  acetaminophen   Tablet .. 650 milliGRAM(s) Oral every 6 hours PRN Temp greater or equal to 38C (100.4F), Mild Pain (1 - 3)  dextrose 40% Gel 15 Gram(s) Oral once PRN Blood Glucose LESS THAN 70 milliGRAM(s)/deciliter  glucagon  Injectable 1 milliGRAM(s) IntraMuscular once PRN Glucose LESS THAN 70 milligrams/deciliter                              9.9    10.47 )-----------( 235      ( 18 Oct 2019 05:19 )             32.9         10-18    141  |  103  |  20  ----------------------------<  213<H>  4.5   |  33<H>  |  1.30    Ca    8.5      18 Oct 2019 05:19  Phos  3.5     10-18  Mg     2.0     10-18    TPro  5.4<L>  /  Alb  2.9<L>  /  TBili  0.3  /  DBili  x   /  AST  13<L>  /  ALT  26  /  AlkPhos  62  10-18        Radiology:     < from: Xray Chest 1 View AP/PA (10.16.19 @ 01:57) >  EXAM:  XR CHEST AP OR PA 1V                            PROCEDURE DATE:  10/16/2019        INTERPRETATION:  Clinical information: Shortness of breath.    Technique: Frontal view of the chest.    Comparison: Prior chest x-ray examination from 10/1/2019.    Findings: The patient is status post median sternotomy.    Small layering bilateral pleural effusions are noted. The   cardiomediastinal silhouette is normal. There are mild multilevel   degenerative changes of the thoracic spine.    IMPRESSION: Small bilateral pleural effusions.        LANE SALOMON M.D., ATTENDING RADIOLOGIST  This document has been electronically signed. Oct 16 2019  8:25AM    < end of copied text >    Bedside ultrasound: B lines on anterior chest    CENTRAL LINE: N  SHARMA: N                        A-LINE: N    GLOBAL ISSUE/BEST PRACTICE:  Analgesia: N  Sedation: N  CAM-ICU: n/a  HOB elevation: yes  Stress ulcer prophylaxis: n/a  VTE prophylaxis: Y  Glycemic control: Y  Nutrition: consistent carb    CODE STATUS: full 24 hour events: Patient seen and examined, lying in bed comfortably. Says "I'm ready to go home". saturates well on room air while resting in bed.      Review of Systems:  Constitutional: No fever, chills  Neuro: No headache, numbness, weakness  Resp: denies any sob, wheezing, difficulty breathing   CVS: chronic b/l LE swelling, No chest pain, palpitations  GI: No abdominal pain, nausea, vomiting, diarrhea   : No dysuria, frequency, incontinence  Psych: No depression, anxiety, mood swings      ICU Vital Signs Last 24 Hrs  T(C): 37 (18 Oct 2019 07:57), Max: 37 (18 Oct 2019 07:57)  T(F): 98.6 (18 Oct 2019 07:57), Max: 98.6 (18 Oct 2019 07:57)  HR: 82 (18 Oct 2019 07:30) (58 - 111)  BP: 111/57 (18 Oct 2019 07:00) (103/55 - 156/85)  BP(mean): 79 (18 Oct 2019 07:00) (74 - 111)  ABP: --  ABP(mean): --  RR: 20 (18 Oct 2019 07:30) (14 - 32)  SpO2: 100% (18 Oct 2019 07:30) (98% - 100%)          CAPILLARY BLOOD GLUCOSE      POCT Blood Glucose.: 216 mg/dL (18 Oct 2019 06:23)  POCT Blood Glucose.: 316 mg/dL (18 Oct 2019 01:28)  POCT Blood Glucose.: 214 mg/dL (17 Oct 2019 21:38)  POCT Blood Glucose.: 236 mg/dL (17 Oct 2019 17:38)  POCT Blood Glucose.: 419 mg/dL (17 Oct 2019 13:51)  POCT Blood Glucose.: 296 mg/dL (17 Oct 2019 10:39)        I&O's Summary    17 Oct 2019 07:01  -  18 Oct 2019 07:00  --------------------------------------------------------  IN: 2880 mL / OUT: 2450 mL / NET: 430 mL          Physical Exam:   Gen: lying in bed, awake, appeared comfortable, NAD  Neuro: AAOx3, answering all questions appropriately, no focal deficits  HEENT: NCAT, PERRL, no JVD  Resp: saturating well on room air while resting. scattered wheezing b/l  CVS: RRR, S1, S2  Abd: soft, nontender, nondistended, bsx4  Ext: trace edema b/l LE  Skin: warm, well perfused       MEDICATIONS  (STANDING):  ALBUTerol/ipratropium for Nebulization 3 milliLiter(s) Nebulizer every 6 hours  aspirin enteric coated 81 milliGRAM(s) Oral daily  atorvastatin 40 milliGRAM(s) Oral at bedtime  buDESOnide    Inhalation Suspension 0.5 milliGRAM(s) Inhalation two times a day  clopidogrel Tablet 75 milliGRAM(s) Oral daily  dextrose 5%. 1000 milliLiter(s) (50 mL/Hr) IV Continuous <Continuous>  dextrose 50% Injectable 12.5 Gram(s) IV Push once  dextrose 50% Injectable 25 Gram(s) IV Push once  dextrose 50% Injectable 25 Gram(s) IV Push once  guaiFENesin  milliGRAM(s) Oral every 12 hours  heparin  Injectable 5000 Unit(s) SubCutaneous every 8 hours  influenza   Vaccine 0.5 milliLiter(s) IntraMuscular once  insulin glargine Injectable (LANTUS) 10 Unit(s) SubCutaneous <User Schedule>  insulin lispro (HumaLOG) corrective regimen sliding scale   SubCutaneous every 4 hours  metoprolol tartrate 12.5 milliGRAM(s) Oral two times a day  pantoprazole  Injectable 40 milliGRAM(s) IV Push daily  predniSONE   Tablet 40 milliGRAM(s) Oral every 12 hours  tamsulosin 0.4 milliGRAM(s) Oral at bedtime    MEDICATIONS  (PRN):  acetaminophen   Tablet .. 650 milliGRAM(s) Oral every 6 hours PRN Temp greater or equal to 38C (100.4F), Mild Pain (1 - 3)  dextrose 40% Gel 15 Gram(s) Oral once PRN Blood Glucose LESS THAN 70 milliGRAM(s)/deciliter  glucagon  Injectable 1 milliGRAM(s) IntraMuscular once PRN Glucose LESS THAN 70 milligrams/deciliter                              9.9    10.47 )-----------( 235      ( 18 Oct 2019 05:19 )             32.9         10-18    141  |  103  |  20  ----------------------------<  213<H>  4.5   |  33<H>  |  1.30    Ca    8.5      18 Oct 2019 05:19  Phos  3.5     10-18  Mg     2.0     10-18    TPro  5.4<L>  /  Alb  2.9<L>  /  TBili  0.3  /  DBili  x   /  AST  13<L>  /  ALT  26  /  AlkPhos  62  10-18        Radiology:     < from: Xray Chest 1 View AP/PA (10.16.19 @ 01:57) >  EXAM:  XR CHEST AP OR PA 1V                            PROCEDURE DATE:  10/16/2019        INTERPRETATION:  Clinical information: Shortness of breath.    Technique: Frontal view of the chest.    Comparison: Prior chest x-ray examination from 10/1/2019.    Findings: The patient is status post median sternotomy.    Small layering bilateral pleural effusions are noted. The   cardiomediastinal silhouette is normal. There are mild multilevel   degenerative changes of the thoracic spine.    IMPRESSION: Small bilateral pleural effusions.        LANE SALOMON M.D., ATTENDING RADIOLOGIST  This document has been electronically signed. Oct 16 2019  8:25AM    < end of copied text >    Bedside ultrasound: B lines on anterior chest    CENTRAL LINE: N  SHARMA: N                        A-LINE: N    GLOBAL ISSUE/BEST PRACTICE:  Analgesia: N  Sedation: N  CAM-ICU: n/a  HOB elevation: yes  Stress ulcer prophylaxis: n/a  VTE prophylaxis: Y  Glycemic control: Y  Nutrition: consistent carb    CODE STATUS: full

## 2019-10-18 NOTE — DISCHARGE NOTE PROVIDER - NSDCCPCAREPLAN_GEN_ALL_CORE_FT
PRINCIPAL DISCHARGE DIAGNOSIS  Diagnosis: COPD exacerbation  Assessment and Plan of Treatment: - symptomatically improved, able to saturate well on room air during day while resting.   - continue all your home medications   - start steroid taper with prednisone   - you need to follow up with your outpatient Pulmonologist within 2-3 days after discharge for medication management   - continue to use your home Oxygen as needed  - if you start to experience any symptoms of shortness of breath, chest pain, palpitations, dizziness, please return to ED immediately      SECONDARY DISCHARGE DIAGNOSES  Diagnosis: Diabetes Mellitus, Type II  Assessment and Plan of Treatment: - you need to follow up with your outpatient PMD and endocrinologist for medication management  - keep a low sugar and low carb diet    Diagnosis: Hypertension  Assessment and Plan of Treatment: - your home medication valsartan was held while you were in the hospital due to relatively low blood pressure   - continue to take your metoprolol as it was prescribed  - follow up with your PMD and your cardiologist within 2-3 days after discharge to discuss whether to continue valsartan at the current dose  - check your blood pressure regularly PRINCIPAL DISCHARGE DIAGNOSIS  Diagnosis: COPD exacerbation  Assessment and Plan of Treatment: - symptomatically improved, able to saturate well on room air during day while resting.   - continue all your home medications   - start steroid taper with prednisone 10mg daily for total of 30 days  - you need to follow up with your outpatient Pulmonologist within 2-3 days after discharge for medication management   - continue to use your home Oxygen as needed  - if you start to experience any symptoms of shortness of breath, chest pain, palpitations, dizziness, please return to ED immediately      SECONDARY DISCHARGE DIAGNOSES  Diagnosis: Diabetes Mellitus, Type II  Assessment and Plan of Treatment: - you were noted to have elevated blood sugar level while in the hospital which is likely resulted from steroid use.   - you need to follow up with your outpatient PMD and endocrinologist for medication management  - keep a low sugar and low carb diet    Diagnosis: Hypertension  Assessment and Plan of Treatment: - your home medication Valsartan was held while you were in the hospital due to relatively low blood pressure   - continue to take your metoprolol as it was prescribed  - follow up with your PMD and your cardiologist within 2-3 days after discharge to discuss whether to continue valsartan at the current dose  - check your blood pressure regularly PRINCIPAL DISCHARGE DIAGNOSIS  Diagnosis: COPD exacerbation  Assessment and Plan of Treatment: - symptomatically improved, able to saturate well on room air during day while resting.   - continue all your home medications   - start steroid taper with prednisone 10mg daily for total of 30 days  - you need to follow up with your outpatient Pulmonologist within 2-3 days after discharge for medication management   - continue to use your home Oxygen as needed  - if you start to experience any symptoms of shortness of breath, chest pain, palpitations, dizziness, please return to ED immediately      SECONDARY DISCHARGE DIAGNOSES  Diagnosis: Diabetes Mellitus, Type II  Assessment and Plan of Treatment: - you were noted to have elevated blood sugar level while in the hospital which is likely resulted from steroid use.   - you need to follow up with your outpatient PMD and endocrinologist for medication management  - use your home insulin as instructed by your PMD for elevated blood glucose and check your blood glucose regularly   - keep a low sugar and low carb diet    Diagnosis: Hypertension  Assessment and Plan of Treatment: - your home medication Valsartan was held while you were in the hospital due to relatively low blood pressure   - continue to take your metoprolol as it was prescribed  - follow up with your PMD and your cardiologist within 2-3 days after discharge to discuss whether to continue valsartan at the current dose  - check your blood pressure regularly

## 2019-10-18 NOTE — PROGRESS NOTE ADULT - ATTENDING COMMENTS
79M h/o CAD s/p CABG x3 2004 with recent stent placement (8/2019), HTN, COPD (on 3L O2 and nocturnal bipap), osteomyelitis, DM, cardiac arrest 6/2018 a/w acute hypoxic and hypercarbic respiratory failure due to COPD exacerbation, transferred to ICU for worsening respiratory status on BiPAP and AFib with RVR in setting of hypoxia/respiratory distress now rate controlled and with improved resp status, saturating 100% on room air at rest.    Neuro: no acute issues  CV: AFib on presentation like 2/2 resp distress/hypoxia, now remains rate controlled in sinus rhythm; continue home dose metoprolol 12.5mg BID, will hold off on restarting valsartan until pt follows with pmd after dc  Pulm: pt now saturating in mid 90s-100% on room air at rest, 2L with ambulation without any signs of resp distress (uses 3L home O2 prn at baseline); RVP negative; to continue nocturnal BiPAP, to dc with extended steroid taper for COPD exacerbation   GI: tolerating diet, no acute issues  Renal: no acute issues  ID: blood cultures negative, cxr and POCUS without evidence of pna - can dc abx as no evidence of active infection  Endo: FS elevated in setting of steroids, better controlled Lantus/ISS combo; pt has glucometer and uses insulin at home when on steroids, will follow up with PMD/endo early next week    Dispo: pt greatly improved, medically stable for discharge at this time. Pt ambulatory with 2L NC and saturating 100%. He is adept at insulin use and checking FS when on steroids as he has done this in the past. He was instructed to arrange for followup with his outpt providers within 2-3 days of hospital discharge

## 2019-10-18 NOTE — PROGRESS NOTE ADULT - NSHPATTENDINGPLANDISCUSS_GEN_ALL_CORE
cardiology, ICU team, patient in detail
med staff , Dr Dejesus ,wife
med staff , Dr Dejesus ,wife at bedside

## 2019-10-22 ENCOUNTER — INPATIENT (INPATIENT)
Facility: HOSPITAL | Age: 79
LOS: 1 days | Discharge: HOME | DRG: 189 | End: 2019-10-24
Attending: HOSPITALIST | Admitting: FAMILY MEDICINE
Payer: COMMERCIAL

## 2019-10-22 VITALS
TEMPERATURE: 98 F | WEIGHT: 199.96 LBS | SYSTOLIC BLOOD PRESSURE: 203 MMHG | HEART RATE: 143 BPM | DIASTOLIC BLOOD PRESSURE: 89 MMHG | RESPIRATION RATE: 28 BRPM

## 2019-10-22 DIAGNOSIS — J95.821 ACUTE POSTPROCEDURAL RESPIRATORY FAILURE: ICD-10-CM

## 2019-10-22 DIAGNOSIS — Z66 DO NOT RESUSCITATE: ICD-10-CM

## 2019-10-22 DIAGNOSIS — Z29.9 ENCOUNTER FOR PROPHYLACTIC MEASURES, UNSPECIFIED: ICD-10-CM

## 2019-10-22 DIAGNOSIS — T14.8XXA OTHER INJURY OF UNSPECIFIED BODY REGION, INITIAL ENCOUNTER: Chronic | ICD-10-CM

## 2019-10-22 DIAGNOSIS — I10 ESSENTIAL (PRIMARY) HYPERTENSION: ICD-10-CM

## 2019-10-22 DIAGNOSIS — J44.1 CHRONIC OBSTRUCTIVE PULMONARY DISEASE WITH (ACUTE) EXACERBATION: ICD-10-CM

## 2019-10-22 DIAGNOSIS — I47.1 SUPRAVENTRICULAR TACHYCARDIA: ICD-10-CM

## 2019-10-22 DIAGNOSIS — E11.9 TYPE 2 DIABETES MELLITUS WITHOUT COMPLICATIONS: ICD-10-CM

## 2019-10-22 DIAGNOSIS — Z95.5 PRESENCE OF CORONARY ANGIOPLASTY IMPLANT AND GRAFT: Chronic | ICD-10-CM

## 2019-10-22 DIAGNOSIS — J96.22 ACUTE AND CHRONIC RESPIRATORY FAILURE WITH HYPERCAPNIA: ICD-10-CM

## 2019-10-22 LAB
ALBUMIN SERPL ELPH-MCNC: 3.6 G/DL — SIGNIFICANT CHANGE UP (ref 3.3–5)
ALP SERPL-CCNC: 91 U/L — SIGNIFICANT CHANGE UP (ref 40–120)
ALT FLD-CCNC: 36 U/L — SIGNIFICANT CHANGE UP (ref 12–78)
ANION GAP SERPL CALC-SCNC: 5 MMOL/L — SIGNIFICANT CHANGE UP (ref 5–17)
APTT BLD: 29.6 SEC — SIGNIFICANT CHANGE UP (ref 28.5–37)
AST SERPL-CCNC: 23 U/L — SIGNIFICANT CHANGE UP (ref 15–37)
BASE EXCESS BLDA CALC-SCNC: 3 MMOL/L — HIGH (ref -2–2)
BASE EXCESS BLDA CALC-SCNC: 3.7 MMOL/L — HIGH (ref -2–2)
BASE EXCESS BLDA CALC-SCNC: 4.2 MMOL/L — HIGH (ref -2–2)
BASOPHILS # BLD AUTO: 0 K/UL — SIGNIFICANT CHANGE UP (ref 0–0.2)
BASOPHILS NFR BLD AUTO: 0 % — SIGNIFICANT CHANGE UP (ref 0–2)
BILIRUB SERPL-MCNC: 0.3 MG/DL — SIGNIFICANT CHANGE UP (ref 0.2–1.2)
BLOOD GAS COMMENTS ARTERIAL: SIGNIFICANT CHANGE UP
BUN SERPL-MCNC: 17 MG/DL — SIGNIFICANT CHANGE UP (ref 7–23)
CALCIUM SERPL-MCNC: 9.5 MG/DL — SIGNIFICANT CHANGE UP (ref 8.5–10.1)
CHLORIDE SERPL-SCNC: 104 MMOL/L — SIGNIFICANT CHANGE UP (ref 96–108)
CK MB BLD-MCNC: 7.1 % — HIGH (ref 0–3.5)
CK MB CFR SERPL CALC: 4.1 NG/ML — HIGH (ref 0–3.6)
CK SERPL-CCNC: 58 U/L — SIGNIFICANT CHANGE UP (ref 26–308)
CO2 SERPL-SCNC: 32 MMOL/L — HIGH (ref 22–31)
CREAT SERPL-MCNC: 1.1 MG/DL — SIGNIFICANT CHANGE UP (ref 0.5–1.3)
EOSINOPHIL # BLD AUTO: 0.49 K/UL — SIGNIFICANT CHANGE UP (ref 0–0.5)
EOSINOPHIL NFR BLD AUTO: 3 % — SIGNIFICANT CHANGE UP (ref 0–6)
FLU A RESULT: SIGNIFICANT CHANGE UP
FLU A RESULT: SIGNIFICANT CHANGE UP
FLUAV AG NPH QL: SIGNIFICANT CHANGE UP
FLUBV AG NPH QL: SIGNIFICANT CHANGE UP
GLUCOSE SERPL-MCNC: 285 MG/DL — HIGH (ref 70–99)
HCO3 BLDA-SCNC: 26 MMOL/L — SIGNIFICANT CHANGE UP (ref 23–27)
HCT VFR BLD CALC: 40.7 % — SIGNIFICANT CHANGE UP (ref 39–50)
HGB BLD-MCNC: 12.3 G/DL — LOW (ref 13–17)
HOROWITZ INDEX BLDA+IHG-RTO: 100 — SIGNIFICANT CHANGE UP
HOROWITZ INDEX BLDA+IHG-RTO: 30 — SIGNIFICANT CHANGE UP
HOROWITZ INDEX BLDA+IHG-RTO: 40 — SIGNIFICANT CHANGE UP
INR BLD: 0.89 RATIO — SIGNIFICANT CHANGE UP (ref 0.88–1.16)
LG PLATELETS BLD QL AUTO: SLIGHT — SIGNIFICANT CHANGE UP
LYMPHOCYTES # BLD AUTO: 17 % — SIGNIFICANT CHANGE UP (ref 13–44)
LYMPHOCYTES # BLD AUTO: 2.78 K/UL — SIGNIFICANT CHANGE UP (ref 1–3.3)
MANUAL SMEAR VERIFICATION: SIGNIFICANT CHANGE UP
MCHC RBC-ENTMCNC: 27.3 PG — SIGNIFICANT CHANGE UP (ref 27–34)
MCHC RBC-ENTMCNC: 30.2 GM/DL — LOW (ref 32–36)
MCV RBC AUTO: 90.4 FL — SIGNIFICANT CHANGE UP (ref 80–100)
MONOCYTES # BLD AUTO: 1.64 K/UL — HIGH (ref 0–0.9)
MONOCYTES NFR BLD AUTO: 10 % — SIGNIFICANT CHANGE UP (ref 2–14)
MYELOCYTES NFR BLD: 1 % — HIGH (ref 0–0)
NEUTROPHILS # BLD AUTO: 11.13 K/UL — HIGH (ref 1.8–7.4)
NEUTROPHILS NFR BLD AUTO: 61 % — SIGNIFICANT CHANGE UP (ref 43–77)
NEUTS BAND # BLD: 7 % — SIGNIFICANT CHANGE UP (ref 0–8)
NRBC # BLD: 0 — SIGNIFICANT CHANGE UP
NRBC # BLD: SIGNIFICANT CHANGE UP /100 WBCS (ref 0–0)
PCO2 BLDA: 68 MMHG — HIGH (ref 32–46)
PCO2 BLDA: 79 MMHG — CRITICAL HIGH (ref 32–46)
PCO2 BLDA: 84 MMHG — CRITICAL HIGH (ref 32–46)
PH BLDA: 7.2 — CRITICAL LOW (ref 7.35–7.45)
PH BLDA: 7.22 — LOW (ref 7.35–7.45)
PH BLDA: 7.26 — LOW (ref 7.35–7.45)
PLAT MORPH BLD: NORMAL — SIGNIFICANT CHANGE UP
PLATELET # BLD AUTO: 274 K/UL — SIGNIFICANT CHANGE UP (ref 150–400)
PO2 BLDA: 101 MMHG — SIGNIFICANT CHANGE UP (ref 74–108)
PO2 BLDA: 142 MMHG — HIGH (ref 74–108)
PO2 BLDA: 184 MMHG — HIGH (ref 74–108)
POTASSIUM SERPL-MCNC: 4.4 MMOL/L — SIGNIFICANT CHANGE UP (ref 3.5–5.3)
POTASSIUM SERPL-SCNC: 4.4 MMOL/L — SIGNIFICANT CHANGE UP (ref 3.5–5.3)
PROT SERPL-MCNC: 7 G/DL — SIGNIFICANT CHANGE UP (ref 6–8.3)
PROTHROM AB SERPL-ACNC: 10.1 SEC — SIGNIFICANT CHANGE UP (ref 10–12.9)
RAPID RVP RESULT: SIGNIFICANT CHANGE UP
RBC # BLD: 4.5 M/UL — SIGNIFICANT CHANGE UP (ref 4.2–5.8)
RBC # FLD: 13.5 % — SIGNIFICANT CHANGE UP (ref 10.3–14.5)
RBC BLD AUTO: SIGNIFICANT CHANGE UP
RSV RESULT: SIGNIFICANT CHANGE UP
RSV RNA RESP QL NAA+PROBE: SIGNIFICANT CHANGE UP
SAO2 % BLDA: 97 % — HIGH (ref 92–96)
SAO2 % BLDA: 98 % — HIGH (ref 92–96)
SAO2 % BLDA: 99 % — HIGH (ref 92–96)
SODIUM SERPL-SCNC: 141 MMOL/L — SIGNIFICANT CHANGE UP (ref 135–145)
TROPONIN I SERPL-MCNC: 0.02 NG/ML — SIGNIFICANT CHANGE UP (ref 0.01–0.04)
TROPONIN I SERPL-MCNC: <.015 NG/ML — SIGNIFICANT CHANGE UP (ref 0.01–0.04)
TROPONIN I SERPL-MCNC: <.015 NG/ML — SIGNIFICANT CHANGE UP (ref 0.01–0.04)
VARIANT LYMPHS # BLD: 1 % — SIGNIFICANT CHANGE UP (ref 0–6)
WBC # BLD: 16.37 K/UL — HIGH (ref 3.8–10.5)
WBC # FLD AUTO: 16.37 K/UL — HIGH (ref 3.8–10.5)

## 2019-10-22 PROCEDURE — 93970 EXTREMITY STUDY: CPT | Mod: 26

## 2019-10-22 PROCEDURE — 99223 1ST HOSP IP/OBS HIGH 75: CPT | Mod: AI

## 2019-10-22 PROCEDURE — 99285 EMERGENCY DEPT VISIT HI MDM: CPT

## 2019-10-22 PROCEDURE — 70450 CT HEAD/BRAIN W/O DYE: CPT | Mod: 26

## 2019-10-22 PROCEDURE — 71275 CT ANGIOGRAPHY CHEST: CPT | Mod: 26

## 2019-10-22 PROCEDURE — 71045 X-RAY EXAM CHEST 1 VIEW: CPT | Mod: 26

## 2019-10-22 PROCEDURE — 99497 ADVNCD CARE PLAN 30 MIN: CPT | Mod: 25

## 2019-10-22 PROCEDURE — 93010 ELECTROCARDIOGRAM REPORT: CPT

## 2019-10-22 PROCEDURE — 99291 CRITICAL CARE FIRST HOUR: CPT

## 2019-10-22 RX ORDER — AZITHROMYCIN 500 MG/1
500 TABLET, FILM COATED ORAL ONCE
Refills: 0 | Status: COMPLETED | OUTPATIENT
Start: 2019-10-22 | End: 2019-10-22

## 2019-10-22 RX ORDER — DEXTROSE 50 % IN WATER 50 %
12.5 SYRINGE (ML) INTRAVENOUS ONCE
Refills: 0 | Status: DISCONTINUED | OUTPATIENT
Start: 2019-10-22 | End: 2019-10-24

## 2019-10-22 RX ORDER — GLUCAGON INJECTION, SOLUTION 0.5 MG/.1ML
1 INJECTION, SOLUTION SUBCUTANEOUS ONCE
Refills: 0 | Status: DISCONTINUED | OUTPATIENT
Start: 2019-10-22 | End: 2019-10-24

## 2019-10-22 RX ORDER — IPRATROPIUM/ALBUTEROL SULFATE 18-103MCG
3 AEROSOL WITH ADAPTER (GRAM) INHALATION
Refills: 0 | Status: COMPLETED | OUTPATIENT
Start: 2019-10-22 | End: 2019-10-22

## 2019-10-22 RX ORDER — MAGNESIUM SULFATE 500 MG/ML
2 VIAL (ML) INJECTION ONCE
Refills: 0 | Status: COMPLETED | OUTPATIENT
Start: 2019-10-22 | End: 2019-10-22

## 2019-10-22 RX ORDER — CLOPIDOGREL BISULFATE 75 MG/1
75 TABLET, FILM COATED ORAL DAILY
Refills: 0 | Status: DISCONTINUED | OUTPATIENT
Start: 2019-10-22 | End: 2019-10-24

## 2019-10-22 RX ORDER — ENOXAPARIN SODIUM 100 MG/ML
40 INJECTION SUBCUTANEOUS DAILY
Refills: 0 | Status: DISCONTINUED | OUTPATIENT
Start: 2019-10-22 | End: 2019-10-24

## 2019-10-22 RX ORDER — IPRATROPIUM/ALBUTEROL SULFATE 18-103MCG
3 AEROSOL WITH ADAPTER (GRAM) INHALATION EVERY 4 HOURS
Refills: 0 | Status: DISCONTINUED | OUTPATIENT
Start: 2019-10-22 | End: 2019-10-23

## 2019-10-22 RX ORDER — IPRATROPIUM/ALBUTEROL SULFATE 18-103MCG
3 AEROSOL WITH ADAPTER (GRAM) INHALATION EVERY 4 HOURS
Refills: 0 | Status: DISCONTINUED | OUTPATIENT
Start: 2019-10-22 | End: 2019-10-22

## 2019-10-22 RX ORDER — AZITHROMYCIN 500 MG/1
TABLET, FILM COATED ORAL
Refills: 0 | Status: DISCONTINUED | OUTPATIENT
Start: 2019-10-22 | End: 2019-10-23

## 2019-10-22 RX ORDER — IPRATROPIUM/ALBUTEROL SULFATE 18-103MCG
3 AEROSOL WITH ADAPTER (GRAM) INHALATION ONCE
Refills: 0 | Status: DISCONTINUED | OUTPATIENT
Start: 2019-10-22 | End: 2019-10-22

## 2019-10-22 RX ORDER — SODIUM CHLORIDE 9 MG/ML
1000 INJECTION, SOLUTION INTRAVENOUS
Refills: 0 | Status: DISCONTINUED | OUTPATIENT
Start: 2019-10-22 | End: 2019-10-24

## 2019-10-22 RX ORDER — BUDESONIDE, MICRONIZED 100 %
0.5 POWDER (GRAM) MISCELLANEOUS
Refills: 0 | Status: DISCONTINUED | OUTPATIENT
Start: 2019-10-22 | End: 2019-10-24

## 2019-10-22 RX ORDER — METOPROLOL TARTRATE 50 MG
12.5 TABLET ORAL
Qty: 0 | Refills: 0 | DISCHARGE

## 2019-10-22 RX ORDER — DEXTROSE 50 % IN WATER 50 %
25 SYRINGE (ML) INTRAVENOUS ONCE
Refills: 0 | Status: DISCONTINUED | OUTPATIENT
Start: 2019-10-22 | End: 2019-10-24

## 2019-10-22 RX ORDER — AZITHROMYCIN 500 MG/1
500 TABLET, FILM COATED ORAL EVERY 24 HOURS
Refills: 0 | Status: DISCONTINUED | OUTPATIENT
Start: 2019-10-23 | End: 2019-10-23

## 2019-10-22 RX ORDER — METOPROLOL TARTRATE 50 MG
2.5 TABLET ORAL ONCE
Refills: 0 | Status: COMPLETED | OUTPATIENT
Start: 2019-10-22 | End: 2019-10-22

## 2019-10-22 RX ORDER — DEXTROSE 50 % IN WATER 50 %
15 SYRINGE (ML) INTRAVENOUS ONCE
Refills: 0 | Status: DISCONTINUED | OUTPATIENT
Start: 2019-10-22 | End: 2019-10-24

## 2019-10-22 RX ORDER — ATORVASTATIN CALCIUM 80 MG/1
40 TABLET, FILM COATED ORAL AT BEDTIME
Refills: 0 | Status: DISCONTINUED | OUTPATIENT
Start: 2019-10-22 | End: 2019-10-24

## 2019-10-22 RX ORDER — CHLORHEXIDINE GLUCONATE 213 G/1000ML
1 SOLUTION TOPICAL
Refills: 0 | Status: DISCONTINUED | OUTPATIENT
Start: 2019-10-22 | End: 2019-10-23

## 2019-10-22 RX ORDER — METOPROLOL TARTRATE 50 MG
2.5 TABLET ORAL ONCE
Refills: 0 | Status: DISCONTINUED | OUTPATIENT
Start: 2019-10-22 | End: 2019-10-22

## 2019-10-22 RX ORDER — ASPIRIN/CALCIUM CARB/MAGNESIUM 324 MG
81 TABLET ORAL DAILY
Refills: 0 | Status: DISCONTINUED | OUTPATIENT
Start: 2019-10-22 | End: 2019-10-24

## 2019-10-22 RX ORDER — INSULIN LISPRO 100/ML
VIAL (ML) SUBCUTANEOUS EVERY 6 HOURS
Refills: 0 | Status: DISCONTINUED | OUTPATIENT
Start: 2019-10-22 | End: 2019-10-23

## 2019-10-22 RX ADMIN — Medication 3 MILLILITER(S): at 20:41

## 2019-10-22 RX ADMIN — Medication 3 MILLILITER(S): at 15:59

## 2019-10-22 RX ADMIN — Medication 6: at 18:50

## 2019-10-22 RX ADMIN — AZITHROMYCIN 255 MILLIGRAM(S): 500 TABLET, FILM COATED ORAL at 21:38

## 2019-10-22 RX ADMIN — Medication 2.5 MILLIGRAM(S): at 08:33

## 2019-10-22 RX ADMIN — Medication 8: at 23:49

## 2019-10-22 RX ADMIN — Medication 3 MILLILITER(S): at 23:29

## 2019-10-22 RX ADMIN — Medication 3 MILLILITER(S): at 12:20

## 2019-10-22 RX ADMIN — Medication 3 MILLILITER(S): at 12:32

## 2019-10-22 RX ADMIN — ENOXAPARIN SODIUM 40 MILLIGRAM(S): 100 INJECTION SUBCUTANEOUS at 13:21

## 2019-10-22 RX ADMIN — Medication 125 MILLIGRAM(S): at 10:25

## 2019-10-22 RX ADMIN — Medication 3 MILLILITER(S): at 12:27

## 2019-10-22 RX ADMIN — Medication 3 MILLILITER(S): at 11:35

## 2019-10-22 RX ADMIN — Medication 0.5 MILLIGRAM(S): at 20:42

## 2019-10-22 RX ADMIN — Medication 3 MILLILITER(S): at 10:38

## 2019-10-22 RX ADMIN — Medication 40 MILLIGRAM(S): at 13:21

## 2019-10-22 RX ADMIN — Medication 50 GRAM(S): at 13:21

## 2019-10-22 RX ADMIN — Medication 40 MILLIGRAM(S): at 21:37

## 2019-10-22 RX ADMIN — Medication 6: at 13:23

## 2019-10-22 NOTE — CONSULT NOTE ADULT - ASSESSMENT
Pt is an 78 y/o male with PMHx of HTN, COPD on 2L home O2 and nocturnal BiPAP, HLD, CAD s/p CABG x3 in 2004 and recent stent 8/19, remote hx of osteomyelitis in femur, prior cardiac arrest 6/2018 in setting of acute hypoxic/hypercarbic respiratory failure secondary to asthma exacerbation and recent hospitalization here in [facility] ICU d/c'd 4 days ago for AECOPD and Afib with RVR here with acute hypercapnic respiratory failure likely secondary to COPD exacerbation vs Asthma exacerbation requiring BiPAP.     NEURO: Transiently obtunded, CT head done for concern of stroke, which was negative for acute intracranial pathology .Event likely secondary to CO2 narcosis. Mentation has improved after placement on BiPAP but patient remains somnolent and lethargic in setting of rising CO2 levels.   CV: Initial -160's, prior history of Afib w/ RVR just a few days ago. Elevated HR's likely in setting of respiratory distress, which is likely what is occurring presently. S/p 6--> 12mg Adenosine, now in Sinus tach to 130's, which again, is likely from distressed state and unless develops SVT/Afib will avoid treating compensatory rhythm. NPO for now on BiPAP, will transition PO metoprolol to IV for now. EKG without concern for ischemia, trop negative. CTA/LE dopplers pending to r/o PE.  Cardiology following.   PULM: Initial ABG shortly after placement on BiPAP: 7.22/79/184/26. Repeat ABG ~30mins after reveals worsening hypercarbia/respiratory acidosis. Pulm (Dr Dejesus) following. Adjusted initial home settings from 18/8 to 24/8 to aid with clearance of CO2. Will repeat ABG in ~30-40mins. Stat Neb now (may need to transition to Xopenex given tachycardia) and s/p 125mg solumedrol IV. Will write for standing nebs. Given climbing CO2 pt at high risk for respiratory compromise likely impending decompensation necessitating intubation. Pulm following.   GI: NPO on BiPAP. Q6h f/s with s/s coverage while NPO.   RENAL: No current issues. Replete lytes as needed, K>4, Phos>3, Mg>2  ENDO: Moderate ISS q6h with F/s q6h   HEME: Lovenox for DVT ppx. Restart Asa/statin/plavix (will assess if able to take PO medications despite BiPAP but unlikely given current state).   ID: Leukocytosis likely inflammatory, afebrile at present doubt infectious process. CXR unremarkable for infiltrative/infectious process. Given recent hospitalization will send Flu AB/RSV, procal. Hold off on antibiotics at present time.   DISPO: Discussion at bedside with wife as pt mentioned prior would not like to be intubated after prior episode. Pt explained further that "only if necessary" would he agree to intubation. Wife, who is HCP proxy in event pt can no longer make decisions explained that for now they both agree to full code/aggressive measures. Admit to ICU. Pt high risk for respiratory compromise. Pt is an 78 y/o male with PMHx of HTN, COPD on 2L home O2 and nocturnal BiPAP, HLD, CAD s/p CABG x3 in 2004 and recent stent 8/19, remote hx of osteomyelitis in femur, prior cardiac arrest 6/2018 in setting of acute hypoxic/hypercarbic respiratory failure secondary to asthma exacerbation and recent hospitalization here in [facility] ICU d/c'd 4 days ago for AECOPD and Afib with RVR here with acute hypercapnic respiratory failure likely secondary to COPD exacerbation vs Asthma exacerbation requiring BiPAP.     NEURO: Transiently obtunded, CT head done for concern of stroke, which was negative for acute intracranial pathology .Event likely secondary to CO2 narcosis. Mentation has improved after placement on BiPAP but patient remains somnolent and lethargic in setting of rising CO2 levels.   CV: Initial -160's, prior history of Afib w/ RVR just a few days ago. Elevated HR's likely in setting of respiratory distress, which is likely what is occurring presently. S/p 6--> 12mg Adenosine, now in Sinus tach to 130's, which again, is likely from distressed state and unless develops SVT/Afib will avoid treating compensatory rhythm. NPO for now on BiPAP, will transition PO metoprolol to IV for now. EKG without concern for ischemia, trop negative. CTA/LE dopplers pending to r/o PE.  Cardiology following.   PULM: Initial ABG shortly after placement on BiPAP: 7.22/79/184/26. Repeat ABG ~30mins after reveals worsening hypercarbia/respiratory acidosis. Pulm (Dr Dejesus) following. Adjusted initial home settings from 18/8 to 24/8 to aid with clearance of CO2. Will repeat ABG in ~30-40mins. Stat Neb now (may need to transition to Xopenex given tachycardia) and s/p 125mg solumedrol IV. Will write for standing nebs +budesonide. Given climbing CO2 pt at high risk for respiratory compromise likely impending decompensation necessitating intubation. Pulm following.   GI: NPO on BiPAP. Q6h f/s with s/s coverage while NPO.   RENAL: No current issues. Replete lytes as needed, K>4, Phos>3, Mg>2  ENDO: Moderate ISS q6h with F/s q6h   HEME: Lovenox for DVT ppx. Restart Asa/statin/plavix (will assess if able to take PO medications despite BiPAP but unlikely given current state).   ID: Leukocytosis likely inflammatory, afebrile at present doubt infectious process. CXR unremarkable for infiltrative/infectious process. Given recent hospitalization will send Flu AB/RSV, procal. Hold off on antibiotics at present time.   DISPO: Discussion at bedside with wife as pt mentioned prior would not like to be intubated after prior episode. Pt explained further that "only if necessary" would he agree to intubation. Wife, who is HCP proxy in event pt can no longer make decisions explained that for now they both agree to full code/aggressive measures. Admit to ICU. Pt high risk for respiratory compromise. Pt is an 80 y/o male with PMHx of HTN, COPD on 2L home O2 and nocturnal BiPAP, HLD, CAD s/p CABG x3 in 2004 and recent stent 8/19, remote hx of osteomyelitis in femur, prior cardiac arrest 6/2018 in setting of acute hypoxic/hypercarbic respiratory failure secondary to asthma exacerbation and recent hospitalization here in [facility] ICU d/c'd 4 days ago for AECOPD and Afib with RVR here with acute hypercapnic respiratory failure likely secondary to COPD exacerbation vs Asthma exacerbation requiring BiPAP.     NEURO: Transiently obtunded, CT head done for concern of stroke, which was negative for acute intracranial pathology .Event likely secondary to CO2 narcosis. Mentation has improved after placement on BiPAP but patient remains somnolent and lethargic in setting of rising CO2 levels.   CV: Initial -160's, prior history of Afib w/ RVR just a few days ago. Elevated HR's likely in setting of respiratory distress, which is likely what is occurring presently. S/p 6--> 12mg Adenosine, now in Sinus tach to 130's, which again, is likely from distressed state and unless develops SVT/Afib will avoid treating compensatory rhythm. NPO for now on BiPAP, will transition PO metoprolol to IV for now. EKG without concern for ischemia, trop negative. CTA/LE dopplers pending to r/o PE.  Cardiology following.   PULM: Initial ABG shortly after placement on BiPAP: 7.22/79/184/26. Repeat ABG ~30mins after reveals worsening hypercarbia/respiratory acidosis. Pulm (Dr Dejesus) following. Adjusted initial home settings from 18/8 to 24/8 to aid with clearance of CO2. Will repeat ABG in ~30-40mins. Stat 3x Neb +2g Mg now (may need to transition to Xopenex given tachycardia) and s/p 125mg solumedrol IV. Will write for standing nebs Q4, budesonide 0.5mg BID, standing Solumedrol 40q8. Given climbing CO2 pt at high risk for respiratory compromise likely impending decompensation necessitating intubation. Pulm following.   GI: NPO on BiPAP. Q6h f/s with s/s coverage while NPO.   RENAL: No current issues. Replete lytes as needed, K>4, Phos>3, Mg>2  ENDO: Moderate ISS q6h with F/s q6h   HEME: Lovenox for DVT ppx. Restart Asa/statin/plavix (will assess if able to take PO medications despite BiPAP but unlikely given current state).   ID: Leukocytosis likely inflammatory, afebrile at present doubt infectious process. CXR unremarkable for infiltrative/infectious process. Given recent hospitalization will send Flu AB/RSV, procal. Hold off on antibiotics at present time.   DISPO: Discussion at bedside with wife as pt mentioned prior would not like to be intubated after prior episode. Pt explained further that "only if necessary" would he agree to intubation. Wife, who is HCP proxy in event pt can no longer make decisions explained that for now they both agree to full code/aggressive measures. Admit to ICU. Pt high risk for respiratory compromise.

## 2019-10-22 NOTE — H&P ADULT - NSHPSOCIALHISTORY_GEN_ALL_CORE
, retired [employer], former etoh social use, former nicotine abuse (smoked for 30 years, quit 30 years ago), +recreational marijuana abuse 1 month ago, wife states also uses cbd oil no thc, no vaping

## 2019-10-22 NOTE — CONSULT NOTE ADULT - SUBJECTIVE AND OBJECTIVE BOX
Patient is a 79y old  Male who presents with a chief complaint of lethargy, worsening shortness of breath and work of breathing (22 Oct 2019 10:19)       Allergies    No Known Allergies    Intolerances    shellfish (Nausea)      HPI:  80yo male with pmhx of COPD (on home O2 2L, and bipap at night and prn), CAD w/ stents (most recent 2019), CABG x3, HLD, DM2 (not insulin dependent), hx of hypercapnic resp failure and cardiac arrest requiring intubation (), osteomyelitis, presented to ED with acute SOB and worsening wob as well as lethargy. Wife states pt was discharged from ICU last week friday and developed worsneing sob and wob last night at 11p and decided to bring him to ED 4am once he became worsened lethargy. She feels his bipap settings and perhaps steroids may not be enough for his ventilation and wob.     In the ED pt had svt 190s. responded to second dose of adenosine, was also given lopressor but was still sinus tachy 129, pt also was noted to have b/l weakness and CT head was wnl. Pt also had abg which showed ph 7.22, co2 79, bipap settings were adjusted. Pts wife says lethargy is stightly improved from arrival but wob conts. (22 Oct 2019 09:15)      PAST MEDICAL & SURGICAL HISTORY:  PVD (peripheral vascular disease)  Hypertension  COPD (chronic obstructive pulmonary disease): on 2L at home and BiPAP at night; intubated   Osteomyelitis  Dyslipidemia  CAD (Coronary Artery Disease): s/p 3v CABG ; stents placed in [place] in 2019  Diabetes Mellitus, Type II  S/P primary angioplasty with coronary stent  Compound fracture: left leg  CABG (Coronary Artery Bypass Graft):       FAMILY HISTORY:  Family hx of lung cancer: brother,  age 82, used to smoke with pt  Family history of diabetes mellitus (Sibling)      REVIEW OF SYSTEMS:    Constitutional: No fever, weight loss or fatigue  Eyes: No eye pain, visual disturbances, or discharge  ENT:  No difficulty hearing, tinnitus, vertigo; No sinus or throat pain  Neck: No pain or stiffness  Breasts: No pain, masses or nipple discharge  Respiratory: No cough, wheezing, chills or hemoptysis  Cardiovascular: No chest pain, palpitations, shortness of breath, dizziness or leg swelling  Gastrointestinal: No abdominal or epigastric pain. No nausea, vomiting or hematemesis; No diarrhea or constipation. No melena or hematochezia.  Genitourinary: No dysuria, frequency, hematuria or incontinence  Rectal: No pain, hemorrhoids or incontinence  Neurological: No headaches, memory loss, loss of strength, numbness or tremors  Skin: No itching, burning, rashes or lesions   Lymph Nodes: No enlarged glands  Endocrine: No heat or cold intolerance; No hair loss  Musculoskeletal: No joint pain or swelling; No muscle, back or extremity pain  Psychiatric: No depression, anxiety, mood swings or difficulty sleeping  Heme/Lymph: No easy bruising or bleeding gums  Allergy and Immunologic: No hives or eczema    MEDICATIONS  (STANDING):  albuterol/ipratropium for Nebulization. 3 milliLiter(s) Nebulizer once  albuterol/ipratropium for Nebulization. 3 milliLiter(s) Nebulizer every 4 hours  aspirin  chewable 81 milliGRAM(s) Oral daily  atorvastatin 40 milliGRAM(s) Oral at bedtime  buDESOnide    Inhalation Suspension 0.5 milliGRAM(s) Inhalation two times a day  chlorhexidine 4% Liquid 1 Application(s) Topical <User Schedule>  clopidogrel Tablet 75 milliGRAM(s) Oral daily  dextrose 5%. 1000 milliLiter(s) (50 mL/Hr) IV Continuous <Continuous>  dextrose 50% Injectable 12.5 Gram(s) IV Push once  dextrose 50% Injectable 25 Gram(s) IV Push once  dextrose 50% Injectable 25 Gram(s) IV Push once  enoxaparin Injectable 40 milliGRAM(s) SubCutaneous daily  insulin lispro (HumaLOG) corrective regimen sliding scale   SubCutaneous every 6 hours    MEDICATIONS  (PRN):  dextrose 40% Gel 15 Gram(s) Oral once PRN Blood Glucose LESS THAN 70 milliGRAM(s)/deciliter  glucagon  Injectable 1 milliGRAM(s) IntraMuscular once PRN Glucose LESS THAN 70 milligrams/deciliter      Vital Signs Last 24 Hrs  T(C): 36.2 (22 Oct 2019 08:04), Max: 36.4 (22 Oct 2019 07:51)  T(F): 97.1 (22 Oct 2019 08:04), Max: 97.6 (22 Oct 2019 07:51)  HR: 120 (22 Oct 2019 10:56) (120 - 144)  BP: 131/61 (22 Oct 2019 08:39) (125/71 - 203/89)  BP(mean): --  RR: 18 (22 Oct 2019 08:39) (18 - 28)  SpO2: 100% (22 Oct 2019 10:56) (100% - 100%)    PHYSICAL EXAM:    Constitutional: NAD, well-groomed, well-developed  HEENT: PERRLA, EOMI, Normal Hearing, MMM  Neck: No LAD, No JVD  Back: Normal spine flexure, No CVA tenderness  Respiratory: CTAB/L   Cardiovascular: S1 and S2, RRR, no M/G/R  Gastrointestinal: BS+, soft, NT/ND  Extremities: No peripheral edema  Vascular: 2+ peripheral pulses  Neurological: A/O x 3, no focal deficits  Skin: No rashes      LABS:                        12.3   16.37 )-----------( 274      ( 22 Oct 2019 08:04 )             40.7     10    141  |  104  |  17  ----------------------------<  285<H>  4.4   |  32<H>  |  1.10    Ca    9.5      22 Oct 2019 08:04  Phos  4.5     10  Mg     1.8     10-22    TPro  7.0  /  Alb  3.6  /  TBili  0.3  /  DBili  x   /  AST  23  /  ALT  36  /  AlkPhos  91  10    PT/INR - ( 22 Oct 2019 08:04 )   PT: 10.1 sec;   INR: 0.89 ratio         PTT - ( 22 Oct 2019 08:04 )  PTT:29.6 sec      ABG - ( 22 Oct 2019 10:31 )  pH, Arterial: 7.20  pH, Blood: x     /  pCO2: 84    /  pO2: 142   / HCO3: 26    / Base Excess: 4.2   /  SaO2: 98                  WBC:  WBC Count: 16.37 K/uL (10-22 @ 08:04)      MICROBIOLOGY:  RECENT CULTURES:  10-16 .Blood Blood-Peripheral XXXX XXXX   No growth at 5 days.          CARDIAC MARKERS ( 22 Oct 2019 08:04 )  <.015 ng/mL / x     / 58 U/L / x     / 4.1 ng/mL        PT/INR - ( 22 Oct 2019 08:04 )   PT: 10.1 sec;   INR: 0.89 ratio         PTT - ( 22 Oct 2019 08:04 )  PTT:29.6 sec    Sodium:  Sodium, Serum: 141 mmol/L (10-22 @ 08:04)      1.10 mg/dL 10-22 @ 08:04      Hemoglobin:  Hemoglobin: 12.3 g/dL (10-22 @ 08:04)      Platelets: Platelet Count - Automated: 274 K/uL (10-22 @ 08:04)      LIVER FUNCTIONS - ( 22 Oct 2019 08:04 )  Alb: 3.6 g/dL / Pro: 7.0 g/dL / ALK PHOS: 91 U/L / ALT: 36 U/L / AST: 23 U/L / GGT: x                 RADIOLOGY & ADDITIONAL STUDIES: Patient is a 79y old  Male who presents with a chief complaint of lethargy, worsening shortness of breath and work of breathing (22 Oct 2019 10:19)       Allergies    No Known Allergies    Intolerances    shellfish (Nausea)      HPI:  80yo male with pmhx of COPD (on home O2 2L, and bipap at night and prn), CAD w/ stents (most recent 2019), CABG x3, HLD, DM2 (not insulin dependent), hx of hypercapnic resp failure and cardiac arrest requiring intubation (), osteomyelitis, presented to ED with acute SOB and worsening wob as well as lethargy. Wife states pt was discharged from ICU last week friday and developed worsneing sob and wob last night at 11p and decided to bring him to ED 4am once he became worsened lethargy. She feels his bipap settings and perhaps steroids may not be enough for his ventilation and wob.     In the ED pt had svt 190s. responded to second dose of adenosine, was also given lopressor but was still sinus tachy 129, pt also was noted to have b/l weakness and CT head was wnl. Pt also had abg which showed ph 7.22, co2 79, bipap settings were adjusted. Pts wife says lethargy is stightly improved from arrival but wob conts. (22 Oct 2019 09:15)      PAST MEDICAL & SURGICAL HISTORY:  PVD (peripheral vascular disease)  Hypertension  COPD (chronic obstructive pulmonary disease): on 2L at home and BiPAP at night; intubated   Osteomyelitis  Dyslipidemia  CAD (Coronary Artery Disease): s/p 3v CABG ; stents placed in [place] in 2019  Diabetes Mellitus, Type II  S/P primary angioplasty with coronary stent  Compound fracture: left leg  CABG (Coronary Artery Bypass Graft):       FAMILY HISTORY:  Family hx of lung cancer: brother,  age 82, used to smoke with pt  Family history of diabetes mellitus (Sibling)      REVIEW OF SYSTEMS:    Constitutional: No fever, weight loss or fatigue  Eyes: No eye pain, visual disturbances, or discharge  ENT:  No difficulty hearing, tinnitus, vertigo; No sinus or throat pain  Neck: No pain or stiffness  Breasts: No pain, masses or nipple discharge  Respiratory: No cough, wheezing, chills or hemoptysis  Cardiovascular: No chest pain, palpitations, shortness of breath, dizziness or leg swelling  Gastrointestinal: No abdominal or epigastric pain. No nausea, vomiting or hematemesis; No diarrhea or constipation. No melena or hematochezia.  Genitourinary: No dysuria, frequency, hematuria or incontinence  Rectal: No pain, hemorrhoids or incontinence  Neurological: No headaches, memory loss, loss of strength, numbness or tremors  Skin: No itching, burning, rashes or lesions   Lymph Nodes: No enlarged glands  Endocrine: No heat or cold intolerance; No hair loss  Musculoskeletal: No joint pain or swelling; No muscle, back or extremity pain  Psychiatric: No depression, anxiety, mood swings or difficulty sleeping  Heme/Lymph: No easy bruising or bleeding gums  Allergy and Immunologic: No hives or eczema    MEDICATIONS  (STANDING):  albuterol/ipratropium for Nebulization. 3 milliLiter(s) Nebulizer once  albuterol/ipratropium for Nebulization. 3 milliLiter(s) Nebulizer every 4 hours  aspirin  chewable 81 milliGRAM(s) Oral daily  atorvastatin 40 milliGRAM(s) Oral at bedtime  buDESOnide    Inhalation Suspension 0.5 milliGRAM(s) Inhalation two times a day  chlorhexidine 4% Liquid 1 Application(s) Topical <User Schedule>  clopidogrel Tablet 75 milliGRAM(s) Oral daily  dextrose 5%. 1000 milliLiter(s) (50 mL/Hr) IV Continuous <Continuous>  dextrose 50% Injectable 12.5 Gram(s) IV Push once  dextrose 50% Injectable 25 Gram(s) IV Push once  dextrose 50% Injectable 25 Gram(s) IV Push once  enoxaparin Injectable 40 milliGRAM(s) SubCutaneous daily  insulin lispro (HumaLOG) corrective regimen sliding scale   SubCutaneous every 6 hours    MEDICATIONS  (PRN):  dextrose 40% Gel 15 Gram(s) Oral once PRN Blood Glucose LESS THAN 70 milliGRAM(s)/deciliter  glucagon  Injectable 1 milliGRAM(s) IntraMuscular once PRN Glucose LESS THAN 70 milligrams/deciliter      Vital Signs Last 24 Hrs  T(C): 36.2 (22 Oct 2019 08:04), Max: 36.4 (22 Oct 2019 07:51)  T(F): 97.1 (22 Oct 2019 08:04), Max: 97.6 (22 Oct 2019 07:51)  HR: 120 (22 Oct 2019 10:56) (120 - 144)  BP: 131/61 (22 Oct 2019 08:39) (125/71 - 203/89)  BP(mean): --  RR: 18 (22 Oct 2019 08:39) (18 - 28)  SpO2: 100% (22 Oct 2019 10:56) (100% - 100%)    PHYSICAL EXAM:    Constitutional: NAD, well-groomed, well-developed  HEENT: PERRLA, EOMI, Normal Hearing, MMM  Neck: No LAD, No JVD  Back: Normal spine flexure, No CVA tenderness  Respiratory: CTAB/L   Cardiovascular: S1 and S2, RRR, no M/G/R  Gastrointestinal: BS+, soft, NT/ND  Extremities: No peripheral edema  Vascular: 2+ peripheral pulses  Neurological: A/O x 3, no focal deficits  Skin: No rashes      LABS:                        12.3   16.37 )-----------( 274      ( 22 Oct 2019 08:04 )             40.7     10    141  |  104  |  17  ----------------------------<  285<H>  4.4   |  32<H>  |  1.10    Ca    9.5      22 Oct 2019 08:04  Phos  4.5     10  Mg     1.8     10-22    TPro  7.0  /  Alb  3.6  /  TBili  0.3  /  DBili  x   /  AST  23  /  ALT  36  /  AlkPhos  91  10    PT/INR - ( 22 Oct 2019 08:04 )   PT: 10.1 sec;   INR: 0.89 ratio         PTT - ( 22 Oct 2019 08:04 )  PTT:29.6 sec      ABG - ( 22 Oct 2019 10:31 )  pH, Arterial: 7.20  pH, Blood: x     /  pCO2: 84    /  pO2: 142   / HCO3: 26    / Base Excess: 4.2   /  SaO2: 98                  WBC:  WBC Count: 16.37 K/uL (10-22 @ 08:04)      MICROBIOLOGY:  RECENT CULTURES:  10-16 .Blood Blood-Peripheral XXXX XXXX   No growth at 5 days.          CARDIAC MARKERS ( 22 Oct 2019 08:04 )  <.015 ng/mL / x     / 58 U/L / x     / 4.1 ng/mL        PT/INR - ( 22 Oct 2019 08:04 )   PT: 10.1 sec;   INR: 0.89 ratio         PTT - ( 22 Oct 2019 08:04 )  PTT:29.6 sec    Sodium:  Sodium, Serum: 141 mmol/L (10-22 @ 08:04)      1.10 mg/dL 10-22 @ 08:04      Hemoglobin:  Hemoglobin: 12.3 g/dL (10-22 @ 08:04)      Platelets: Platelet Count - Automated: 274 K/uL (10-22 @ 08:04)      LIVER FUNCTIONS - ( 22 Oct 2019 08:04 )  Alb: 3.6 g/dL / Pro: 7.0 g/dL / ALK PHOS: 91 U/L / ALT: 36 U/L / AST: 23 U/L / GGT: x                 RADIOLOGY & ADDITIONAL STUDIES:    COMMODORE TURNER  1940 DOA 10/22/2019 [facility] P [phone]  DR DAYRON JUNIOR  ALLERGY Shellfish  CONTACT [facility]      Initial evaluation/Pulmonary Critical Care consultation requested on 10/22/2019   by Dr Junior   from Dr Dejesus   Patient examined chart reviewed    HOSPITAL ADMISSION   PATIENT CAME  FROM (if information available)      PATIENT COMMODORE TURNER  1940 DOA 10/22/2019 [facility] P [phone]                           PT DESCRIPTION       This section was excerpted from ER md note but was independently verified by me    · Chief Complaint: The patient is a 79y Male complaining of irregular heartbeat.  · HPI Objective Statement: 80yo M with COPD on home BiPAP, CAD, CABG, HTN, DM [facility] with report pt in SVT 190s-200s, given adenosine 6mg , then 12mg with break in svt to sinus tach at 150s.  EMS st pt became aphasic, and bilateral weakness 11 minutes pta at ED.  no report trauma, fever, n/v/d.  rest of history unknown.  wife on way to ED    PAST MEDICAL/SURGICAL/FAMILY/SOCIAL HISTORY:    Past Medical History:  CAD (Coronary Artery Disease)  s/p 3v CABG   COPD (chronic obstructive pulmonary disease)  on 2L at home and BiPAP at night; intubated   Diabetes Mellitus, Type II    Dyslipidemia    Hypertension    Osteomyelitis    PVD (peripheral vascular disease).     Past Surgical History:  CABG (Coronary Artery Bypass Graft)    Compound fracture  left leg  S/P primary angioplasty with coronary stent.     Tobacco Usage:  · Tobacco Usage Unknown if ever smoked    ALLERGIES AND HOME MEDICATIONS:   Allergies:        Allergies:  No Known Allergies:        Intolerances:  shellfish: Food, Nausea, feels nauseous when eating crabs or shrimp but no true allergic reaction    Home Medications:   * Incomplete Medication History as of 22-Oct-2019 08:02 documented in Structured Notes  · predniSONE 10 mg oral tablet: 1 tab(s) orally once a day MDD:TAKE PREDNISONE AS DIRECTED  · pantoprazole 40 mg oral delayed release tablet: 1 tab(s) orally once a day (before a meal)  · aspirin 81 mg oral delayed release tablet: Last Dose Taken:  , 1 tab(s) orally once a day  · clopidogrel 75 mg oral tablet: Last Dose Taken:  , 1 tab(s) orally once a day  · atorvastatin 40 mg oral tablet: Last Dose Taken:  , 1 tab(s) orally once a day  · tamsulosin 0.4 mg oral capsule: 1 cap(s) orally once a day (at bedtime)  · Breo Ellipta 100 mcg-25 mcg/inh inhalation powder: Last Dose Taken:  , 1 puff(s) inhaled once a day  · metFORMIN 1000 mg oral tablet: 1 tab(s) orally 2 times a day  · Incruse Ellipta 62.5 mcg/inh inhalation powder: 1 puff(s) inhaled once a day  · glipiZIDE 2.5 mg oral tablet, extended release: Last Dose Taken:  , 1 tab(s) orally 2 times a day  · Metoprolol Tartrate 25 mg oral tablet: 12.5  orally 2 times a day  · Ventolin HFA 90 mcg/inh inhalation aerosol: 2 puff(s) inhaled 4 times a day, As Needed  · Daliresp 500 mcg oral tablet: Last Dose Taken:  , 1 tab(s) orally once a day    REVIEW OF SYSTEMS:    Review of Systems:  · CONSTITUTIONAL: no fever and no chills.  · EYES: no discharge, no irritation, no pain, no redness, and no visual changes.  · ENMT: Ears: no ear pain and no hearing problems.Nose: no nasal congestion and no nasal drainage.Mouth/Throat: no dysphagia, no hoarseness and no throat pain.Neck: no lumps, no pain, no stiffness and no swollen glands.  · CARDIOVASCULAR: - - -  · Cardiovascular [+]: svt  · RESPIRATORY: - - -  · Respiratory [+]: copd  · GASTROINTESTINAL: no abdominal pain, no bloating, no constipation, no diarrhea, no nausea and no vomiting.  · GENITOURINARY: no dysuria, no frequency, and no hematuria.  · MUSCULOSKELETAL: no back pain, no gout, no musculoskeletal pain, no neck pain, and no weakness.  · SKIN: no abrasions, no jaundice, no lesions, no pruritis, and no rashes.  · NEUROLOGICAL: - - -  · Neurological [+]: ams    VITAL SIGNS( Pullset):    ,,ED ADULT Flow Sheet:    22-Oct-2019 07:45  · Peripheral IV Insertion Date: 22-Oct-2019  · Inserted by: EMS  · Peripheral IV Location: Left:  · Device Device: Angiocath  · Peripheral IV Gauge/Length: 20 gauge  · IV Device Number of Insertion Attempts: 1  · IV Device Patient Tolerance: Insertion: tolerated well  · IV Device Dressing/Securement: dressing dry and intact  · IV Device Patency/Placement: flushed without difficulty  · Specimen Drawn: Blood; Blood Culture(s); Lactate  · Specimen Drawn Date/Time: 22-Oct-2019 07:45  · Method: Peripheral IV  · Attempts: 1  · Action: Sent to lab    07:50  · Specimen Drawn: Blood Culture(s)  · Specimen Drawn Date/Time: 22-Oct-2019 07:50  · Method: Peripheral IV  · Attempts: 1  · Action: Sent to lab    07:51  · Temp (F): 97.6  · Temp (C) Temp (C): 36.4  · Temp site Temp Site: axillary  · Heart Rate Heart Rate (beats/min): 143  · BP Systolic Systolic: 203  · BP Diastolic Diastolic (mm Hg): 89  · Respiration Rate (breaths/min) Respiration Rate (breaths/min): 28  · O2 delivery Patient On: room air  · How was the weight captured? Weight Type/Method: stated  · Dosing Weight (KILOGRAMS) Dosing Weight (KILOGRAMS): 90.7  · Dosing Weight  (POUNDS) Dosing Weight (POUNDS): 199.9  · Height (FEET) Height (FEET): 6  · Presence of Pain: non-verbal indicators of pain/discomfort absent  · O2 delivery Patient On: room air  · Preferred Language to Address Healthcare Preferred Language to Address Healthcare: English    07:58  · Presence of Pain: non-verbal indicators of pain/discomfort absent  · Pain Rating (0-10): Rest: 0  · Pain Rating (0-10): Activity: 0  · Preferred Language to Address Healthcare Preferred Language to Address Healthcare: English  · Patient Belongings Patient Belongings:: Clothing  · Extensions of Self Extensions of Self: None    08:04  · Temp (F): 97.1  · Temp (C) Temp (C): 36.2  · Heart Rate Heart Rate (beats/min): 138  · BP Systolic Systolic: 182  · BP Diastolic Diastolic (mm Hg): 80  · Respiration Rate (breaths/min) Respiration Rate (breaths/min): 26  · O2 delivery Patient On: supplemental O2  · Oxygen Therapy Flow (L/min) Oxygen Flow (L/min): 8  · Oxygen Therapy: Delivery Method Delivery Method: nonrebreather mask    PHYSICAL EXAM:   · Physical Examination: Gen: Awake, drowsy, Alert x 2 person, place, WD, WN, mild distress, follows commands  Head:  NC/AT  Eyes:  PERRL, EOMI, Conjunctiva pink, lids normal, no scleral icterus  ENT: OP clear, +bridge, moist mucus membranes  Neck: supple, nontender, no JVD, trachea midline  Cardiac/CV:  S1 S2, Tachycardia @ 140s, no M/G/R  Respiratory/Pulm:  +mild rales b/l  Gastrointestinal/Abdomen:  Soft, nontender, nondistended, +BS, no rebound/guarding  Back:  no CVAT, no MLT  Ext:  warm, well perfused, moving all extremities spontaneously, no peripheral edema, distal pulses intact  Skin: intact, no rash  Neuro:  AAOx2, sensation intact, speech clear, b/l arms no drift. b/l legs moves feet /toes to command, unable to lift legs. 1a. 1.  1b. 1  1c 0.  2 0  3.  0  4. 0  5a 0  5b  0   6a. 2  6b  2   7.  0  8.  0  9.  0  10.  0  11.  0                                                                                                                                                                                     NIHSS=6              PATIENT COMMODALAN TURNER  1940 DO[birth year] 10/22/2019 [facility] P [phone]   VITALS/LABS       10/22/2019 115 111/60 100%   10/22/2019 1p /.5   10/22/2019 W 16.3 H 12.3 Plt 274 INR .8 Na 141 K 4.4 CI2 32 Cr 1.1   10/22/2019 pc n   10/22/2019 Tr 1 n   10/22/2019 1p /.3 726/68/101  10/22/2019 10a 8/.4 720/84/142   10/22/2019 8a nrb 722/79/184  10/22/2019 flu ab n rsv n   10/22/2019 cta ch no pe   10/22/2019 v duplex n   10/17 ekg s tachy   10/18/2019W 10.4 Hb 9.9 Plt 235 Na 141 K 4.5 CO2 33 Cr 1.3             REVIEW OF SYMPTOMS     NOTE Noteworthy changes  if any  in ROS and PE are also entered  in note  below      Able to give ROS  Yes     RELIABLE No   CONSTITUTIONAL Weakness Yes  Chills No Vision changes No  ENDOCRINE No unexplained hair loss No heat or cold intolerance    ALLERGY No hives  Sore throat No   RESP Coughing blood no  Shortness of breath YES   NEURO No Headache  Confusion Pain neck No   CARDIAC No Chest pain No Palpitations   GI No Pain abdomen NO   Vomiting NO     PHYSICAL EXAM    HEENT Unremarkable PERRLA atraumatic   RESP Fair air entry EXP prolonged    Harsh breath sound Resp distres mild   CARDIAC S1 S2 No S3     NO JVD    ABDOMEN SOFT BS PRESENT NOT DISTENDED No hepatosplenomegaly PEDAL EDEMA present No calf tenderness  NO rash   GENERAL Not TOXIC looking    PATIENT COMMODORE YUE  1940 DOA 10/22/2019 [facility] P [phone]                           PATIENT DATA   ALLERGY shellfish  ADVANCED DIRECTIVE       WT  112             BMI                                                                                                                                            HEAD OF BED ELEVATION Yes  EKG     10/17 ekg s tachy               CXR      10/22 cxr napd              DVT PROPHYLAXIS    lvnx 40 (10/22)      STRESS ULCER PROPHYLAXIS  INFECTION PPLX                                                                                 DIET  DYSPHAGIA EVAL IF APPLICABLE                                                                    GAS EXCHANGE   10/22/2019 1p 24/12/.3 726/68/101  10/22/2019 10a /.4 720/84/142   10/22/2019 8a nrb 722/79/184                                                                                    HEMODYNAMICS  DRIPS                                                                                          IV FLUIDS  INTAKE OUTPUT                                                     MICROBIO      10/22/2019 pc n   10/22/2019 flu ab n rsv n   ANTIBIOTICS        INDWELLING TUBES  LINES                                                        PATIENT COMMODORE YUE  1940 DOA 10/22/2019 [facility] P [phone]                           Pt description                            CC    palpitations r side face weak     er vitals     203/89 140 28 afeb                   HPI              79 m former smoker PMH HTN, DM2 (on home Metformin and glipizide), COPD (2L home/ BIPAP at night), CAD with 3v CABG , Stent 2019 [facility], 10/26/2018 ECHO ef 55% hx hypercapnic resp failure with ho intubation c/b with cardiac arrest (2018) with ho recurrent admissions GOLD D recent admission  2019 and 10/2019 with copd ex ac on chr hypercapnic resp failure  was  admitted with SVT Rxed with adenosine ac hypercapnic resp failure

## 2019-10-22 NOTE — CONSULT NOTE ADULT - ATTENDING COMMENTS
Seen/examined. agree with above.  SVT in the setting of respiratory distress and hypercarbic failure  continue bb and bipap as needed Seen/examined. agree with above.  SVT in the setting of respiratory distress and hypercarbic failure  continue bb and bipap as needed  Critically ill. High risk of decompensation.

## 2019-10-22 NOTE — CONSULT NOTE ADULT - SUBJECTIVE AND OBJECTIVE BOX
Patient is a 79y old  Male who presents with a chief complaint of SOB and palpitations    HPI: Pt is an 80 yo male with PMH of PVD, HTN, COPD (on BiPAP and 2L at home), dyslipidemia, CAD (s/p CABG x3), and DM, who comes in with elevated heart rate in 200s and difficulty breathing that began earlier this morning. Per wife at bedside, pt was trying to sleep at home with his BiPAP machine during initial onset of symptoms. When pt came into the ED, he received adenosine and experienced b/l weakness and aphasia immediately after administration that have since resolved. Pt states that he feels much better now. Pt was recently discharged from ICU 4 days ago for COPD exacerbation. Pt has a history of elevated heart rates that typically occur when he has trouble breathing. Unsure if ever received adenosine in the past. Pt has a history of CABGx3 in 2004 but recently had a stent replacement August 2019 at [facility]. Last stress test August 2019, unremarkable per wife. Last echo this past month, wife unsure of results. Cardiologist is Dr. Rod. Pt is scheduled to have stents replaced in his legs at the end of the month with Dr. Buckley. Denies fevers, chills, CP.       PAST MEDICAL & SURGICAL HISTORY:  PVD (peripheral vascular disease)  Hypertension  COPD (chronic obstructive pulmonary disease): on 2L at home and BiPAP at night; intubated 6/18  Osteomyelitis  Dyslipidemia  CAD (Coronary Artery Disease): s/p 3v CABG 2004  Diabetes Mellitus, Type II  S/P primary angioplasty with coronary stent  Compound fracture: left leg  CABG (Coronary Artery Bypass Graft): 2004      ECHO  FINDINGS: < from: TTE Echo Doppler w/o Cont (03.12.19 @ 20:42) >  CONCLUSIONS:  1. Technically difficult and limited study  2. The endocardium is not well-visualized. In limited views, the LV   function appears to be preserved with an estimated EF of 55%. There is   paradoxical motion of the septum in the setting of prior cardiac surgery.  3. Calcified aortic valve with normal opening. Trace aortic regurgitation  4. Thickened and calcified mitral valve leaflets with trace to mild   mitral regurgitation  5. Doppler evidence of rate 1 diastolic dysfunction  6. The interatrial septum appears aneurysmal  7. No significant pericardial effusion.  < end of copied text >    Home Medications:  aspirin 81 mg oral delayed release tablet: 1 tab(s) orally once a day (22 Oct 2019 08:02)  atorvastatin 40 mg oral tablet: 1 tab(s) orally once a day (22 Oct 2019 08:02)  Breo Ellipta 100 mcg-25 mcg/inh inhalation powder: 1 puff(s) inhaled once a day (22 Oct 2019 08:02)  clopidogrel 75 mg oral tablet: 1 tab(s) orally once a day (22 Oct 2019 08:02)  Daliresp 500 mcg oral tablet: 1 tab(s) orally once a day (22 Oct 2019 08:02)  glipiZIDE 2.5 mg oral tablet, extended release: 1 tab(s) orally 2 times a day (22 Oct 2019 08:02)  Incruse Ellipta 62.5 mcg/inh inhalation powder: 1 puff(s) inhaled once a day (18 Oct 2019 12:32)  metFORMIN 1000 mg oral tablet: 1 tab(s) orally 2 times a day (18 Oct 2019 12:32)  Metoprolol Tartrate 25 mg oral tablet: 12.5  orally 2 times a day (18 Oct 2019 12:32)  tamsulosin 0.4 mg oral capsule: 1 cap(s) orally once a day (at bedtime) (18 Oct 2019 12:32)  Ventolin HFA 90 mcg/inh inhalation aerosol: 2 puff(s) inhaled 4 times a day, As Needed (18 Oct 2019 12:32)    FAMILY HISTORY:  Family history of diabetes mellitus (Sibling)      Constitutional: denies fever, chills  Respiratory: admits SOB  Cardiovascular: denies CP, palpitations, LE edema  Gastrointestinal: denies nausea, vomiting, abdominal pain  Neurologic: denies headache, weakness, dizziness  ROS negative except as noted above      SOCIAL HISTORY:  , No tobacco, Alcohol or Drug use    Vital Signs Last 24 Hrs  T(C): 36.2 (22 Oct 2019 08:04), Max: 36.4 (22 Oct 2019 07:51)  T(F): 97.1 (22 Oct 2019 08:04), Max: 97.6 (22 Oct 2019 07:51)  HR: 127 (22 Oct 2019 08:39) (127 - 144)  BP: 131/61 (22 Oct 2019 08:39) (125/71 - 203/89)  BP(mean): --  RR: 18 (22 Oct 2019 08:39) (18 - 28)  SpO2: 100% (22 Oct 2019 08:30) (100% - 100%)    Physical Exam:  General: Well developed, well nourished, appears to be in discomfort taking deep breaths with BiPAP machine  HEENT: BiPAP machine in place  Neurology: A&Ox3, nonfocal, sensation intact   Respiratory: scattered expiratory wheezes in lung fields bilaterally  Cardiology: +S1/S2, rapid rate and regular rhythm, no murmurs, rubs or gallops  Abdominal: Soft, NT, ND +BSx4, no palpable masses  Extremities: No C/C/E, + peripheral pulses  MSK: muscle strength intact in upper extremities  Skin: warm, dry, normal color      ECG: sinus tachycardia at 142      LABS:                        12.3   16.37 )-----------( 274      ( 22 Oct 2019 08:04 )             40.7     10-22    141  |  104  |  17  ----------------------------<  285<H>  4.4   |  32<H>  |  1.10    Ca    9.5      22 Oct 2019 08:04    TPro  7.0  /  Alb  3.6  /  TBili  0.3  /  DBili  x   /  AST  23  /  ALT  36  /  AlkPhos  91  10-22    CARDIAC MARKERS ( 22 Oct 2019 08:04 )  <.015 ng/mL / x     / 58 U/L / x     / 4.1 ng/mL      PT/INR - ( 22 Oct 2019 08:04 )   PT: 10.1 sec;   INR: 0.89 ratio         PTT - ( 22 Oct 2019 08:04 )  PTT:29.6 sec      RADIOLOGY & ADDITIONAL STUDIES:  < from: CT Head No Cont (10.22.19 @ 08:17) >  Impression:    Motion degraded exam.  No gross acute intracranial hemorrhage or mass effect noted.  < end of copied text >

## 2019-10-22 NOTE — CONSULT NOTE ADULT - ASSESSMENT
Pt is an 78 yo male with PMH of PVD, HTN, COPD (on BiPAP and 2L at home), dyslipidemia, CAD (s/p CABG x3), and DM presenting with SVT and shortness of breath, received Adenosine x2, SVT now resolved but still sinus tachy, shortness of breath improved with BiPAP machine in the ED.    - No clear evidence of acute ischemia, initial trop negative, will consider a second set but patient is asymptomatic  - EKG now showing sinus tachycardia. Monitor on tele.  - Previous ECHO from 03/19 showing normal LV EF of 55%, with mild valvular calcifications. Patient's wife reports recent echo was done 1 month ago but unsure of the results. Will contact Dr. Rod to obtain results.  - BP now well controlled, continue home BP meds, monitor routine hemodynamics  - monitor and replete lytes, keep K>4, Mg>2  - Other cardiovascular workup will depend on clinical course.  - All other workup per primary team  - Will follow Pt is an 78 yo male with PMH of PVD, HTN, COPD (on BiPAP and 2L at home), dyslipidemia, CAD (s/p CABG x3), and DM presenting with SVT and shortness of breath, received Adenosine x2.  SVT now resolved but still sinus tachy, shortness of breath in the setting of hypercarbia, now improved with bipap    - Had SVT during prior admission in the setting of hypercarbia. He has been on metoprolol 25 mg po bid. I would continue this at current dose  - No clear evidence of acute ischemia. Troponin is negative.  - Continue Asa, plavix and statin for CAD  - EKG now showing sinus tachycardia, without worrisome findings. Monitor on tele.  - Previous ECHO from 03/19 showing normal LV EF of 55%, with mild valvular calcifications. Patient's wife reports recent echo was done 1 month ago but unsure of the results. Will contact Dr. Rod to obtain results.  - BP elevated on arrival in the setting of acute respiratory distress, now improved.  - Pulmonary evaluation and bipap for hypercarbic respiratory failure.  - monitor and replete lytes, keep K>4, Mg>2  - Other cardiovascular workup will depend on clinical course.  - All other workup per primary team  - Will follow with you

## 2019-10-22 NOTE — CHART NOTE - NSCHARTNOTEFT_GEN_A_CORE
80 yo M with Hx asthma and COPD, chronic hypoxemic and hypercapnic respiratory failure with frequent admission for COPD/asthma exacerbation, also CAD presenting with dyspnea, and found to have SVT into 160s with acute on chronic hypercapnic and hypoxemic respiratory failure.  SVT was treated by adenosine in the field and resolved but respiratory status remains tenuous.      exam about noon in ICU:  pt is acutely ill appearing with access mm use  rr mid 30s with TVs 350-500s on BiPAP 24/12  pulm: minimal air movement  CV: tachy, regular  abd soft, NTND    all labs and imaging reviewed, see consult note for full details  flu and RSV swab negative, RVP pending  CTA without PE or pneumonia but some mucus impaction in distal small airways of lower lobes    Assessment/Plan:    Admit to ICU  On BiPAP 24/12 with improvement in ABG and lung exam with better air movement.  EPAP decreased to 8.  Over the course of the afternoon his mental status and respiratory status continued to improved, became alert, rr in mid 20s with TVs >500  continue BiPAP tonight  continue steroids, standing bronchodilators  check RVP, no evidence of pneumonia on CTA but has mucus in lower lobes  empiric azithromycin for now   CAD, continue plavix, ASA, statin, hemodynamically stable  renal function at baseline  NPO for now while on BiPAP  plan discussed in detail with wife    Pt critically ill.  Total CC time throughout the day 60min

## 2019-10-22 NOTE — ED ADULT NURSE NOTE - OBJECTIVE STATEMENT
Pt BIBA with c/o SVT as per EMS IV access gained and Adenosine 6mg given then 12mg given and which was successful and the pt instantly developed right sided facial droop and right side weakness. Pt disoriented and able to answer simple questions. As per Md's orders IV alexys placed blood specimen obtained and sent to the lab. Pt made stroke protocol and now in transit to CT scan to R/O CVA

## 2019-10-22 NOTE — GOALS OF CARE CONVERSATION - ADVANCED CARE PLANNING - CONVERSATION DETAILS
Spoke with wife pt sleeping on bipap. States he wants all done but does not want to live on machines.

## 2019-10-22 NOTE — ED ADULT TRIAGE NOTE - NS ED TRIAGE PATIENT LAST STATUS
[FreeTextEntry1] : Trent remains physically active, and has no symptoms which could be considered angina.  He does have CAD however.  The worst of his disease is branch disease not readily amenable to PCI. Recent stress testing and echocardiography did not reveal any significant abnormalities last year.  \par \par At this time, medical therapy continues to be appropriate. \par \par He will have his cholesterol checked today.I suggested a repeat stress test to reevaluate his EKG response to exercise. He will have another carotid ultrasound, with comparison made to the prior study from 4 years ago.\par \par He may proceed with cataract surgery, without additional cardiac testing.
07:30

## 2019-10-22 NOTE — ED ADULT NURSE NOTE - NSIMPLEMENTINTERV_GEN_ALL_ED
Implemented All Fall with Harm Risk Interventions:  [place] to call system. Call bell, personal items and telephone within reach. Instruct patient to call for assistance. Room bathroom lighting operational. Non-slip footwear when patient is off stretcher. Physically safe environment: no spills, clutter or unnecessary equipment. Stretcher in lowest position, wheels locked, appropriate side rails in place. Provide visual cue, wrist band, yellow gown, etc. Monitor gait and stability. Monitor for mental status changes and reorient to person, place, and time. Review medications for side effects contributing to fall risk. Reinforce activity limits and safety measures with patient and family. Provide visual clues: red socks.

## 2019-10-22 NOTE — H&P ADULT - HISTORY OF PRESENT ILLNESS
80yo male with pmhx of COPD (on home O2 2L, and bipap at night and prn), CAD w/ stents (most recent 8/2019), CABG x3, HLD, DM2 (not insulin dependent), hx of hypercapnic resp failure and cardiac arrest requiring intubation (2018), osteomyelitis, presented to ED with acute SOB and worsening wob as well as lethargy. Wife states pt was discharged from ICU last week friday and developed worsneing sob and wob last night at 11p and decided to bring him to ED 4am once he became worsened lethargy. She feels his bipap settings and perhaps steroids may not be enough for his ventilation and wob.     In the ED pt had svt 190s. responded to second dose of adenosine, was also given lopressor but was still sinus tachy 129, pt also was noted to have b/l weakness and CT head was wnl. Pt also had abg which showed ph 7.22, co2 79, bipap settings were adjusted. Pts wife says lethargy is stightly improved from arrival but wob conts.

## 2019-10-22 NOTE — CONSULT NOTE ADULT - ASSESSMENT
admit to icu monitor need for intubation PATIENT COMMODALAN TURNER  1940 DOA 10/22/2019 [facility] P [phone]                          PULMONARY/CRITICAL CARE ASSESSMENT/RECOMMENDATIONS                       AC HYPERCAPNIC RESP FAILURE    10/22/2019 1p 24/12/.3 726/68/101  10/22/2019 10a 18/8/.4 720/84/142   10/22/2019 8a nrb 722/79/184  A/R  Improved with bpap Monitor closely If decomepnsates then intubation     RESP TRACT INFECTION  10/22/2019 W 16.3   10/22/2019 pc n    10/22/2019 flu ab n rsv n    Doubty bacterial infection Leukocytosis may be sec steroids     COPD ex   Duoneb (10/22)   Pulmicort (10/22)  solumed 40.3 (10/22)    Cont rx       CAD  stent 2019   asa 81 (10/22)   plavix (10/22)   atorvastat (10/22)   Cont rx    RO VTE           10/22/2019 cta ch no pe   10/22/2019 v duplex n   VTE ruled out    DM  insulin (10/22)     DISPOSITION  Monitor resp failure status and need for intubation  Cardiac monitoring for arrhythmia and ischemia             TIME SPENT Over 55 minutes aggregate care time spent on encounter; activities included   direct pt care, counseling and/or coordinating care reviewing notes, lab data/ imaging , discussion with multidisciplinary team/ pt /family. Risks, benefits, alternatives  discussed in detail.

## 2019-10-22 NOTE — CONSULT NOTE ADULT - SUBJECTIVE AND OBJECTIVE BOX
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 As per wife at bedside, endorses patient having difficulty sleeping overnight secondary to dyspnea. Pt initially tried home BiPAP machine to alleviate symptoms but did not relive SOB. Subsequently took a NEB treatment which transiently helped but became SOB again, placed back on BiPAP at home. Wife took vitals via mobile pulse-ox and was found to have HR in 160's, then called EMS. En route, pt found to be in SVT, s/p 6mg --> 12mg SVT which broke SVT. In ED pt had transient episode of obtundation prompting concern for stroke (no focal deficits). Head CT done in ED negative for acute intracranial         Events last 24 hours: ***    PAST MEDICAL & SURGICAL HISTORY:  PVD (peripheral vascular disease)  Hypertension  COPD (chronic obstructive pulmonary disease): on 2L at home and BiPAP at night; intubated 6/18  Osteomyelitis  Dyslipidemia  CAD (Coronary Artery Disease): s/p 3v CABG 2004; stents placed in [place] in 2019  Diabetes Mellitus, Type II  S/P primary angioplasty with coronary stent  Compound fracture: left leg  CABG (Coronary Artery Bypass Graft): 2004    Allergies    No Known Allergies    Intolerances    shellfish (Nausea)    FAMILY HISTORY:  Family history of diabetes mellitus (Sibling)      Review of Systems:  CONSTITUTIONAL: No fever, chills, or fatigue  EYES: No eye pain, visual disturbances, or discharge  ENMT:  No difficulty hearing, tinnitus, vertigo; No sinus or throat pain  NECK: No pain or stiffness  RESPIRATORY: No cough, wheezing, chills or hemoptysis; No shortness of breath  CARDIOVASCULAR: No chest pain, palpitations, dizziness, or leg swelling  GASTROINTESTINAL: No abdominal or epigastric pain. No nausea, vomiting, or hematemesis; No diarrhea or constipation. No melena or hematochezia.  GENITOURINARY: No dysuria, frequency, hematuria, or incontinence  NEUROLOGICAL: No headaches, memory loss, loss of strength, numbness, or tremors  SKIN: No itching, burning, rashes, or lesions   MUSCULOSKELETAL: No joint pain or swelling; No muscle, back, or extremity pain  PSYCHIATRIC: No depression, anxiety, mood swings, or difficulty sleeping      Medications:      albuterol/ipratropium for Nebulization. 3 milliLiter(s) Nebulizer every 4 hours              methylPREDNISolone sodium succinate Injectable 125 milliGRAM(s) IV Push once                  ICU Vital Signs Last 24 Hrs  T(C): 36.2 (22 Oct 2019 08:04), Max: 36.4 (22 Oct 2019 07:51)  T(F): 97.1 (22 Oct 2019 08:04), Max: 97.6 (22 Oct 2019 07:51)  HR: 127 (22 Oct 2019 08:39) (127 - 144)  BP: 131/61 (22 Oct 2019 08:39) (125/71 - 203/89)  BP(mean): --  ABP: --  ABP(mean): --  RR: 18 (22 Oct 2019 08:39) (18 - 28)  SpO2: 100% (22 Oct 2019 08:30) (100% - 100%)    Vital Signs Last 24 Hrs  T(C): 36.2 (22 Oct 2019 08:04), Max: 36.4 (22 Oct 2019 07:51)  T(F): 97.1 (22 Oct 2019 08:04), Max: 97.6 (22 Oct 2019 07:51)  HR: 127 (22 Oct 2019 08:39) (127 - 144)  BP: 131/61 (22 Oct 2019 08:39) (125/71 - 203/89)  BP(mean): --  RR: 18 (22 Oct 2019 08:39) (18 - 28)  SpO2: 100% (22 Oct 2019 08:30) (100% - 100%)    ABG - ( 22 Oct 2019 08:34 )  pH, Arterial: 7.22  pH, Blood: x     /  pCO2: 79    /  pO2: 184   / HCO3: 26    / Base Excess: 3.7   /  SaO2: 99                  I&O's Detail        LABS:                        12.3   16.37 )-----------( 274      ( 22 Oct 2019 08:04 )             40.7     10-22    141  |  104  |  17  ----------------------------<  285<H>  4.4   |  32<H>  |  1.10    Ca    9.5      22 Oct 2019 08:04    TPro  7.0  /  Alb  3.6  /  TBili  0.3  /  DBili  x   /  AST  23  /  ALT  36  /  AlkPhos  91  10-22      CARDIAC MARKERS ( 22 Oct 2019 08:04 )  <.015 ng/mL / x     / 58 U/L / x     / 4.1 ng/mL      CAPILLARY BLOOD GLUCOSE  274 (22 Oct 2019 08:25)        PT/INR - ( 22 Oct 2019 08:04 )   PT: 10.1 sec;   INR: 0.89 ratio         PTT - ( 22 Oct 2019 08:04 )  PTT:29.6 sec    CULTURES:  Culture Results:   No growth at 5 days. (10-16 @ 09:02)  Culture Results:   No growth at 5 days. (10-16 @ 09:02)      Physical Examination:    General: No acute distress.      HEENT: Pupils equal, reactive to light.  Symmetric.    PULM: Clear to auscultation bilaterally, no significant sputum production    NECK: Supple, no lymphadenopathy, trachea midline    CVS: Regular rate and rhythm, no murmurs, rubs, or gallops    ABD: Soft, nondistended, nontender, normoactive bowel sounds, no masses    EXT: No edema, nontender    SKIN: Warm and well perfused, no rashes noted.    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 As per wife at bedside, endorses patient having difficulty sleeping overnight secondary to dyspnea. Pt initially tried home BiPAP machine to alleviate symptoms but did not relive SOB. Subsequently took a NEB treatment which transiently helped but became SOB again, placed back on BiPAP at home. Wife took vitals via mobile pulse-ox and was found to have HR in 160's, then called EMS. En route, pt found to be in SVT, s/p 6mg --> 12mg SVT which broke SVT. In ED pt had transient episode of obtundation prompting concern for stroke (no focal deficits). Head CT done in ED negative for acute intracranial pathology. ABG revealed respiratory acidosis secondary to hypercapnia (7.22/79/184/26), subsequently placed on BiPAP. MICU consult called. Currently complaining of feeling sleepy (which patient states is due to not sleeping last night) but denies SOB, CP, H/A, dizziness, abd pain or any other acute complaints at this time.     PAST MEDICAL & SURGICAL HISTORY:  PVD (peripheral vascular disease)  Hypertension  COPD (chronic obstructive pulmonary disease): on 2L at home and BiPAP at night; intubated 6/18  Osteomyelitis  Dyslipidemia  CAD (Coronary Artery Disease): s/p 3v CABG 2004; stents placed in [place] in 2019  Diabetes Mellitus, Type II  S/P primary angioplasty with coronary stent  Compound fracture: left leg  CABG (Coronary Artery Bypass Graft): 2004    Allergies  No Known Allergies    Intolerances  shellfish (Nausea)    FAMILY HISTORY:  Family history of diabetes mellitus (Sibling)    Review of Systems:  Negative except as per HPI    Medications:  albuterol/ipratropium for Nebulization. 3 milliLiter(s) Nebulizer every 4 hours  methylPREDNISolone sodium succinate Injectable 125 milliGRAM(s) IV Push once    ICU Vital Signs Last 24 Hrs  T(C): 36.2 (22 Oct 2019 08:04), Max: 36.4 (22 Oct 2019 07:51)  T(F): 97.1 (22 Oct 2019 08:04), Max: 97.6 (22 Oct 2019 07:51)  HR: 127 (22 Oct 2019 08:39) (127 - 144)  BP: 131/61 (22 Oct 2019 08:39) (125/71 - 203/89)  BP(mean): --  ABP: --  ABP(mean): --  RR: 18 (22 Oct 2019 08:39) (18 - 28)  SpO2: 100% (22 Oct 2019 08:30) (100% - 100%)    Vital Signs Last 24 Hrs  T(C): 36.2 (22 Oct 2019 08:04), Max: 36.4 (22 Oct 2019 07:51)  T(F): 97.1 (22 Oct 2019 08:04), Max: 97.6 (22 Oct 2019 07:51)  HR: 127 (22 Oct 2019 08:39) (127 - 144)  BP: 131/61 (22 Oct 2019 08:39) (125/71 - 203/89)  BP(mean): --  RR: 18 (22 Oct 2019 08:39) (18 - 28)  SpO2: 100% (22 Oct 2019 08:30) (100% - 100%)    ABG - ( 22 Oct 2019 08:34 )  pH, Arterial: 7.22  pH, Blood: x     /  pCO2: 79    /  pO2: 184   / HCO3: 26    / Base Excess: 3.7   /  SaO2: 99        I&O's Detail    LABS:                        12.3   16.37 )-----------( 274      ( 22 Oct 2019 08:04 )             40.7     10-22    141  |  104  |  17  ----------------------------<  285<H>  4.4   |  32<H>  |  1.10    Ca    9.5      22 Oct 2019 08:04    TPro  7.0  /  Alb  3.6  /  TBili  0.3  /  DBili  x   /  AST  23  /  ALT  36  /  AlkPhos  91  10-22      CARDIAC MARKERS ( 22 Oct 2019 08:04 )  <.015 ng/mL / x     / 58 U/L / x     / 4.1 ng/mL      CAPILLARY BLOOD GLUCOSE  274 (22 Oct 2019 08:25)    PT/INR - ( 22 Oct 2019 08:04 )   PT: 10.1 sec;   INR: 0.89 ratio    PTT - ( 22 Oct 2019 08:04 )  PTT:29.6 sec    CULTURES:  Culture Results:   No growth at 5 days. (10-16 @ 09:02)  Culture Results:   No growth at 5 days. (10-16 @ 09:02)    Physical Examination:    General: Ill appearing, lying in stretcher with eyes closes but arousable, oriented x3, lethargic in NAD     HEENT: Pupils equal, reactive to light.  Symmetric.    PULM: minimal airflow b/l lung fields, scattered wheezing b/l, no rhonchi or rales, no cough/sputum production. Increased respiratory muscle use (+adbominal muscle use, no intercostal retractions), tachypneic to 30-40's    NECK: Supple, no lymphadenopathy, trachea midline    CVS: Sinus tachycardia 120-130's, +s1/s2, no murmurs    ABD: Soft, distended, nontender, normoactive bowel sounds, abdominal muscle use for breathing    EXT: No edema, nontender    SKIN: Cool, dry, pulses faintly palpable, no rashes noted.    NEURO: Alert, oriented, interactive, nonfocal    RADIOLOGY: < from: Xray Chest 1 View- PORTABLE-Urgent (10.22.19 @ 08:42) >  Technique: XR CHEST URGENT    Comparison:10/16/2019.    Findings:  Lines: None    Heart/Mediastinum/Lungs: The heart size is normal.The lungs are   clear.There are no pleural effusions.    Impression:    Clear lungs. 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 As per wife at bedside, endorses patient having difficulty sleeping overnight secondary to dyspnea. Pt initially tried home BiPAP machine to alleviate symptoms but did not relive SOB. Subsequently took a NEB treatment which transiently helped but became SOB again, placed back on BiPAP at home. Wife took vitals via mobile pulse-ox and was found to have HR in 160's, then called EMS. En route, pt found to be in SVT, s/p 6mg --> 12mg SVT which broke SVT. In ED pt had transient episode of obtundation prompting concern for stroke (no focal deficits). Head CT done in ED negative for acute intracranial pathology. ABG revealed respiratory acidosis secondary to hypercapnia (7.22/79/184/26), subsequently placed on BiPAP. MICU consult called. Currently complaining of feeling sleepy (which patient states is due to not sleeping last night) but denies SOB, CP, H/A, dizziness, abd pain or any other acute complaints at this time.     PAST MEDICAL & SURGICAL HISTORY:  PVD (peripheral vascular disease)  Hypertension  COPD (chronic obstructive pulmonary disease): on 2L at home and BiPAP at night; intubated 6/18  Osteomyelitis  Dyslipidemia  CAD (Coronary Artery Disease): s/p 3v CABG 2004; stents placed in [place] in 2019  Diabetes Mellitus, Type II  S/P primary angioplasty with coronary stent  Compound fracture: left leg  CABG (Coronary Artery Bypass Graft): 2004    Allergies  No Known Allergies    Intolerances  shellfish (Nausea)    FAMILY HISTORY:  Family history of diabetes mellitus (Sibling)    Review of Systems:  Negative except as per HPI    Medications:  albuterol/ipratropium for Nebulization. 3 milliLiter(s) Nebulizer every 4 hours  methylPREDNISolone sodium succinate Injectable 125 milliGRAM(s) IV Push once    ICU Vital Signs Last 24 Hrs  T(C): 36.2 (22 Oct 2019 08:04), Max: 36.4 (22 Oct 2019 07:51)  T(F): 97.1 (22 Oct 2019 08:04), Max: 97.6 (22 Oct 2019 07:51)  HR: 127 (22 Oct 2019 08:39) (127 - 144)  BP: 131/61 (22 Oct 2019 08:39) (125/71 - 203/89)  BP(mean): --  ABP: --  ABP(mean): --  RR: 18 (22 Oct 2019 08:39) (18 - 28)  SpO2: 100% (22 Oct 2019 08:30) (100% - 100%)    Vital Signs Last 24 Hrs  T(C): 36.2 (22 Oct 2019 08:04), Max: 36.4 (22 Oct 2019 07:51)  T(F): 97.1 (22 Oct 2019 08:04), Max: 97.6 (22 Oct 2019 07:51)  HR: 127 (22 Oct 2019 08:39) (127 - 144)  BP: 131/61 (22 Oct 2019 08:39) (125/71 - 203/89)  BP(mean): --  RR: 18 (22 Oct 2019 08:39) (18 - 28)  SpO2: 100% (22 Oct 2019 08:30) (100% - 100%)    ABG - ( 22 Oct 2019 08:34 )  pH, Arterial: 7.22  pH, Blood: x     /  pCO2: 79    /  pO2: 184   / HCO3: 26    / Base Excess: 3.7   /  SaO2: 99        I&O's Detail    LABS:                        12.3   16.37 )-----------( 274      ( 22 Oct 2019 08:04 )             40.7     10-22    141  |  104  |  17  ----------------------------<  285<H>  4.4   |  32<H>  |  1.10    Ca    9.5      22 Oct 2019 08:04    TPro  7.0  /  Alb  3.6  /  TBili  0.3  /  DBili  x   /  AST  23  /  ALT  36  /  AlkPhos  91  10-22      CARDIAC MARKERS ( 22 Oct 2019 08:04 )  <.015 ng/mL / x     / 58 U/L / x     / 4.1 ng/mL      CAPILLARY BLOOD GLUCOSE  274 (22 Oct 2019 08:25)    PT/INR - ( 22 Oct 2019 08:04 )   PT: 10.1 sec;   INR: 0.89 ratio    PTT - ( 22 Oct 2019 08:04 )  PTT:29.6 sec    CULTURES:  Culture Results:   No growth at 5 days. (10-16 @ 09:02)  Culture Results:   No growth at 5 days. (10-16 @ 09:02)    Physical Examination:    General: Ill appearing, lying in stretcher with eyes closes but arousable, oriented x3, lethargic in NAD     HEENT: Pupils equal, reactive to light.  Symmetric.    PULM: minimal airflow b/l lung fields, scattered wheezing b/l, no rhonchi or rales, no cough/sputum production. Increased respiratory muscle use (+adbominal muscle use, no intercostal retractions), tachypneic to 30-40's    NECK: Supple, no lymphadenopathy, trachea midline    CVS: Sinus tachycardia 120-130's, +s1/s2, no murmurs    ABD: Soft, distended, nontender, normoactive bowel sounds, abdominal muscle use for breathing    EXT: No edema, nontender    SKIN: Cool, dry, pulses faintly palpable, no rashes noted.    NEURO: Alert, oriented, interactive, nonfocal    RADIOLOGY: < from: Xray Chest 1 View- PORTABLE-Urgent (10.22.19 @ 08:42) >  Technique: XR CHEST URGENT    Comparison:10/16/2019.    Findings:  Lines: None    Heart/Mediastinum/Lungs: The heart size is normal.The lungs are   clear.There are no pleural effusions.    Impression:    Clear lungs.    Critical Care time: 38 mins assessing presenting problems of acute illness that poses high probability of life threatening deterioration or end organ damage/dysfunction.  Medical decision making including Initiating plan of care, reviewing data, reviewing radiology, direct patient bedside evaluation and interpretation of vital signs, any necessary ventilator management , discussion with multidisciplinary team, discussing goals of care with patient/family, all non inclusive of procedures

## 2019-10-22 NOTE — ED PROVIDER NOTE - PROGRESS NOTE DETAILS
pt much improved, more alert, moving all extremities, can now lift both legs and hold up against gravity  wife at bedside, st pt recently discharged from ICU, was on antibiotics for elevated WBC count. rectal temp= 97 now

## 2019-10-22 NOTE — CONSULT NOTE ADULT - REASON FOR ADMISSION
lethargy, worsening shortness of breath and work of breathing
lethargy, worsening shortness of breath and work of breathing

## 2019-10-22 NOTE — H&P ADULT - ASSESSMENT
78yo male with pmhx of COPD (on home O2 2L, and bipap at night and prn), CAD w/ stents (most recent 8/2019), CABG x3, HLD, DM2 (not insulin dependent), hx of hypercapnic resp failure and cardiac arrest requiring intubation (2018), osteomyelitis, presented to ED with worsening sob and wob a/w acute on chronic respiratory failure with hypercarbia req continuous bipap sec to acute copd exacerbation, r/o PE and svt now sinus tachycardia

## 2019-10-22 NOTE — H&P ADULT - NSHPATTENDINGPLANDISCUSS_GEN_ALL_CORE
wife/hcp/caretaker at bedside, Dr Salgado ED attending, Dr Sidhu ICU attending and Alessandro ICU PA

## 2019-10-22 NOTE — PATIENT PROFILE ADULT - FAMILY NEEDS
Per order of Dr. Franco Urrutia a post void residual was done via Bladder scanner.  PVR was 30 cc.            no

## 2019-10-22 NOTE — H&P ADULT - PROBLEM SELECTOR PLAN 1
guarded prognosis  cont bipap, apprec pulm and icu collaboration on adjustments  abg noted, trend abg  apprec icu consultation  cont steroids and resp support, plan as per pulm/icu  r/o PE, dvt; well score moderate risk  check pct, bnp

## 2019-10-22 NOTE — H&P ADULT - NSICDXFAMILYHX_GEN_ALL_CORE_FT
FAMILY HISTORY:  Sibling  Still living? Unknown  Family history of diabetes mellitus, Age at diagnosis: Age Unknown FAMILY HISTORY:  Family hx of lung cancer, brother,  age 82, used to smoke with pt    Sibling  Still living? Unknown  Family history of diabetes mellitus, Age at diagnosis: Age Unknown

## 2019-10-22 NOTE — H&P ADULT - ATTENDING COMMENTS
20 minutes were spent discussing and coordinating advance directives, apprec palliative collaboration, wife is hcp pt too lethargic to make medical decisions

## 2019-10-22 NOTE — H&P ADULT - PROBLEM SELECTOR PLAN 8
as per wife pt would not want intubation, but want chest compressions if needed, apprec palliative collaboration for MOLST

## 2019-10-22 NOTE — ED PROVIDER NOTE - CLINICAL SUMMARY MEDICAL DECISION MAKING FREE TEXT BOX
78yo M with CAD, COPD, DM presented with SVT resolved with adenosine x 2 and now sinus tachycardia. pt with generalized weakness and drowsiness, pt with prior hypercarbia, ABG, CT HEad, Hiren, labs, FSG, EKG, heart rate control, BP management, r/o ICH, r/o hypercarbia, r/o ACS, r/o arrthymia will provide BiPAP. cardiology, pulmonology

## 2019-10-22 NOTE — ED PROVIDER NOTE - CARE PLAN
Principal Discharge DX:	Acute on chronic respiratory failure with hypercapnia  Secondary Diagnosis:	CAD (coronary artery disease)  Secondary Diagnosis:	COPD (chronic obstructive pulmonary disease)  Secondary Diagnosis:	Diabetes mellitus, type II

## 2019-10-22 NOTE — H&P ADULT - NSICDXPASTMEDICALHX_GEN_ALL_CORE_FT
PAST MEDICAL HISTORY:  CAD (Coronary Artery Disease) s/p 3v CABG 2004; stents placed in [place] in 2019    COPD (chronic obstructive pulmonary disease) on 2L at home and BiPAP at night; intubated 6/18    Diabetes Mellitus, Type II     Dyslipidemia     Hypertension     Osteomyelitis     PVD (peripheral vascular disease)

## 2019-10-22 NOTE — ED PROVIDER NOTE - PHYSICAL EXAMINATION
Gen: Awake, drowsy, Alert x 2 person, place, WD, WN, mild distress, follows commands  Head:  NC/AT  Eyes:  PERRL, EOMI, Conjunctiva pink, lids normal, no scleral icterus  ENT: OP clear, +bridge, moist mucus membranes  Neck: supple, nontender, no JVD, trachea midline  Cardiac/CV:  S1 S2, Tachycardia @ 140s, no M/G/R  Respiratory/Pulm:  +mild rales b/l  Gastrointestinal/Abdomen:  Soft, nontender, nondistended, +BS, no rebound/guarding  Back:  no CVAT, no MLT  Ext:  warm, well perfused, moving all extremities spontaneously, no peripheral edema, distal pulses intact  Skin: intact, no rash  Neuro:  AAOx2, sensation intact, speech clear, b/l arms no drift. b/l legs moves feet /toes to command, unable to lift legs. 1a. 1.  1b. 1  1c 0.  2 0  3.  0  4. 0  5a 0  5b  0   6a. 2  6b  2   7.  0  8.  0  9.  0  10.  0  11.  0                                                                                                                                                                                     NIHSS=6

## 2019-10-22 NOTE — ED PROVIDER NOTE - SECONDARY DIAGNOSIS.
CAD (coronary artery disease) COPD (chronic obstructive pulmonary disease) Diabetes mellitus, type II

## 2019-10-23 DIAGNOSIS — J44.1 CHRONIC OBSTRUCTIVE PULMONARY DISEASE WITH (ACUTE) EXACERBATION: ICD-10-CM

## 2019-10-23 DIAGNOSIS — J96.01 ACUTE RESPIRATORY FAILURE WITH HYPOXIA: ICD-10-CM

## 2019-10-23 DIAGNOSIS — R73.9 HYPERGLYCEMIA, UNSPECIFIED: ICD-10-CM

## 2019-10-23 LAB
ANION GAP SERPL CALC-SCNC: 4 MMOL/L — LOW (ref 5–17)
ANION GAP SERPL CALC-SCNC: 8 MMOL/L — SIGNIFICANT CHANGE UP (ref 5–17)
APTT BLD: 30.5 SEC — SIGNIFICANT CHANGE UP (ref 28.5–37)
BASE EXCESS BLDA CALC-SCNC: 6.8 MMOL/L — HIGH (ref -2–2)
BLOOD GAS COMMENTS ARTERIAL: SIGNIFICANT CHANGE UP
BUN SERPL-MCNC: 20 MG/DL — SIGNIFICANT CHANGE UP (ref 7–23)
BUN SERPL-MCNC: 23 MG/DL — SIGNIFICANT CHANGE UP (ref 7–23)
CALCIUM SERPL-MCNC: 9.4 MG/DL — SIGNIFICANT CHANGE UP (ref 8.5–10.1)
CALCIUM SERPL-MCNC: 9.6 MG/DL — SIGNIFICANT CHANGE UP (ref 8.5–10.1)
CHLORIDE SERPL-SCNC: 104 MMOL/L — SIGNIFICANT CHANGE UP (ref 96–108)
CHLORIDE SERPL-SCNC: 99 MMOL/L — SIGNIFICANT CHANGE UP (ref 96–108)
CO2 SERPL-SCNC: 32 MMOL/L — HIGH (ref 22–31)
CO2 SERPL-SCNC: 34 MMOL/L — HIGH (ref 22–31)
CREAT SERPL-MCNC: 1.3 MG/DL — SIGNIFICANT CHANGE UP (ref 0.5–1.3)
CREAT SERPL-MCNC: 1.4 MG/DL — HIGH (ref 0.5–1.3)
GLUCOSE SERPL-MCNC: 262 MG/DL — HIGH (ref 70–99)
GLUCOSE SERPL-MCNC: 339 MG/DL — HIGH (ref 70–99)
HCO3 BLDA-SCNC: 30 MMOL/L — HIGH (ref 23–27)
HCT VFR BLD CALC: 34.6 % — LOW (ref 39–50)
HGB BLD-MCNC: 10.4 G/DL — LOW (ref 13–17)
HOROWITZ INDEX BLDA+IHG-RTO: 30 — SIGNIFICANT CHANGE UP
INR BLD: 1.02 RATIO — SIGNIFICANT CHANGE UP (ref 0.88–1.16)
MCHC RBC-ENTMCNC: 27.2 PG — SIGNIFICANT CHANGE UP (ref 27–34)
MCHC RBC-ENTMCNC: 30.1 GM/DL — LOW (ref 32–36)
MCV RBC AUTO: 90.3 FL — SIGNIFICANT CHANGE UP (ref 80–100)
NRBC # BLD: 0 /100 WBCS — SIGNIFICANT CHANGE UP (ref 0–0)
PCO2 BLDA: 52 MMHG — HIGH (ref 32–46)
PH BLDA: 7.4 — SIGNIFICANT CHANGE UP (ref 7.35–7.45)
PLATELET # BLD AUTO: 234 K/UL — SIGNIFICANT CHANGE UP (ref 150–400)
PO2 BLDA: 138 MMHG — HIGH (ref 74–108)
POTASSIUM SERPL-MCNC: 4.8 MMOL/L — SIGNIFICANT CHANGE UP (ref 3.5–5.3)
POTASSIUM SERPL-MCNC: 5.4 MMOL/L — HIGH (ref 3.5–5.3)
POTASSIUM SERPL-SCNC: 4.8 MMOL/L — SIGNIFICANT CHANGE UP (ref 3.5–5.3)
POTASSIUM SERPL-SCNC: 5.4 MMOL/L — HIGH (ref 3.5–5.3)
PROTHROM AB SERPL-ACNC: 11.5 SEC — SIGNIFICANT CHANGE UP (ref 10–12.9)
RBC # BLD: 3.83 M/UL — LOW (ref 4.2–5.8)
RBC # FLD: 13.2 % — SIGNIFICANT CHANGE UP (ref 10.3–14.5)
SAO2 % BLDA: 99 % — HIGH (ref 92–96)
SODIUM SERPL-SCNC: 139 MMOL/L — SIGNIFICANT CHANGE UP (ref 135–145)
SODIUM SERPL-SCNC: 142 MMOL/L — SIGNIFICANT CHANGE UP (ref 135–145)
WBC # BLD: 9.61 K/UL — SIGNIFICANT CHANGE UP (ref 3.8–10.5)
WBC # FLD AUTO: 9.61 K/UL — SIGNIFICANT CHANGE UP (ref 3.8–10.5)

## 2019-10-23 PROCEDURE — 99291 CRITICAL CARE FIRST HOUR: CPT

## 2019-10-23 PROCEDURE — 99233 SBSQ HOSP IP/OBS HIGH 50: CPT

## 2019-10-23 RX ORDER — IPRATROPIUM/ALBUTEROL SULFATE 18-103MCG
3 AEROSOL WITH ADAPTER (GRAM) INHALATION EVERY 6 HOURS
Refills: 0 | Status: DISCONTINUED | OUTPATIENT
Start: 2019-10-23 | End: 2019-10-24

## 2019-10-23 RX ORDER — AZITHROMYCIN 500 MG/1
500 TABLET, FILM COATED ORAL EVERY 24 HOURS
Refills: 0 | Status: DISCONTINUED | OUTPATIENT
Start: 2019-10-23 | End: 2019-10-24

## 2019-10-23 RX ORDER — METOPROLOL TARTRATE 50 MG
25 TABLET ORAL
Refills: 0 | Status: DISCONTINUED | OUTPATIENT
Start: 2019-10-23 | End: 2019-10-24

## 2019-10-23 RX ORDER — INSULIN LISPRO 100/ML
VIAL (ML) SUBCUTANEOUS
Refills: 0 | Status: DISCONTINUED | OUTPATIENT
Start: 2019-10-23 | End: 2019-10-24

## 2019-10-23 RX ADMIN — Medication 6: at 05:45

## 2019-10-23 RX ADMIN — Medication 10: at 14:06

## 2019-10-23 RX ADMIN — Medication 3 MILLILITER(S): at 14:42

## 2019-10-23 RX ADMIN — AZITHROMYCIN 500 MILLIGRAM(S): 500 TABLET, FILM COATED ORAL at 22:44

## 2019-10-23 RX ADMIN — Medication 3 MILLILITER(S): at 03:24

## 2019-10-23 RX ADMIN — ENOXAPARIN SODIUM 40 MILLIGRAM(S): 100 INJECTION SUBCUTANEOUS at 14:07

## 2019-10-23 RX ADMIN — Medication 40 MILLIGRAM(S): at 14:06

## 2019-10-23 RX ADMIN — ATORVASTATIN CALCIUM 40 MILLIGRAM(S): 80 TABLET, FILM COATED ORAL at 22:44

## 2019-10-23 RX ADMIN — Medication 8: at 22:07

## 2019-10-23 RX ADMIN — Medication 2: at 17:54

## 2019-10-23 RX ADMIN — CLOPIDOGREL BISULFATE 75 MILLIGRAM(S): 75 TABLET, FILM COATED ORAL at 14:05

## 2019-10-23 RX ADMIN — Medication 81 MILLIGRAM(S): at 14:06

## 2019-10-23 RX ADMIN — Medication 3 MILLILITER(S): at 07:45

## 2019-10-23 RX ADMIN — Medication 40 MILLIGRAM(S): at 05:45

## 2019-10-23 RX ADMIN — Medication 0.5 MILLIGRAM(S): at 07:45

## 2019-10-23 NOTE — PROGRESS NOTE ADULT - ASSESSMENT
80 yo male with PMH of PVD, HTN, COPD (on BiPAP and 2L at home), dyslipidemia, CAD (s/p CABG x3), and DM presenting with SVT and shortness of breath, received Adenosine x2.  SVT now resolved but still sinus tachy, shortness of breath in the setting of hypercarbia, now improved with bipap    -no acute ischemia  -recent pci cont dapt  -cont statin    -no vol overload of significance  -mild edema, can observe for now    - Had SVT during prior admission in the setting of hypercarbia. He has been on metoprolol 25 mg po bid.   - would resume if possible so as to avoid recurrent arrhythmia  -Monitor on tele.    - Previous ECHO from 03/19 showing normal LV EF of 55%, with mild valvular calcifications. Patient's wife reports recent echo was done 1 month ago but unsure of the results. Plesae contact Dr. Rod to obtain results.    - BP elevated on arrival in the setting of acute respiratory distress, now improved.     - monitor and replete lytes, keep K>4, Mg>2  - Other cardiovascular workup will depend on clinical course.  - All other workup per primary team  - Will follow with you    Noting brittle resp status, the patient remains at risk of abrupt decompensation.  I have personally provided  35 minutes of critical care time, excluding time spent on separate procedures.

## 2019-10-23 NOTE — PHARMACOTHERAPY INTERVENTION NOTE - COMMENTS
patient on IV azithromycin 500 mg for COPD exacerbation.  Recommended to switch to oral 500 mg q 24 hours for 2 more doses.   recommendation to MD (Thea) accepted and order entered.

## 2019-10-23 NOTE — DIETITIAN INITIAL EVALUATION ADULT. - CONTINUE CURRENT NUTRITION CARE PLAN
Advance to CC no snack, dash/tlc when medically appropriate.  yes  Monitor po intake and diet tolerance after diet advancement. Education needs to be assessed./yes Change diet  to CC no snack, dash/tlc,   Monitor po intake and diet tolerance, remain available for diet education if desired./yes

## 2019-10-23 NOTE — PROGRESS NOTE ADULT - PROBLEM SELECTOR PLAN 2
Plan as above   steroids and nebs as per pulm recs
Continue inhaled bronchodilators and IV steroids. Empiric azithromycin. No evidence of acute lung infiltrates on CTA chest.

## 2019-10-23 NOTE — DIETITIAN INITIAL EVALUATION ADULT. - OTHER INFO
As per chart pt is a 78 y/o M with a h/o HTN, COPD on 2L home O2 and nocturnal BiPAP, HLD, CAD, with acute mixed hypoxemic/hypercapnic respiratory failure, acute COPD exacerbation, hyperglycemia. Pt recently discharged from [facility] few days ago. NFPE deferred at this time. Pt appears well nourished.  Charts from prior admissions reviewed- no wt loss apparent, follows CC, low salt /fat diet at home.  Current wt much > UBW of 207# and wt last week of 213#.  Request reweight as no edema noted. As per chart pt is a 80 y/o M with a h/o HTN, COPD on 2L home O2 and nocturnal BiPAP, HLD, CAD, with acute mixed hypoxemic/hypercapnic respiratory failure, acute COPD exacerbation, hyperglycemia. Pt recently discharged from [facility] few days ago. NFPE deferred at this time. Pt appears well nourished.  Charts from prior admissions reviewed- no wt loss apparent.  Current wt 211#, wt last admission 213#.  Subjective information obtained from wife as pt busy eating bfst.  Diet compliance an issue at home.  Wife reports pt has a sweet tooth, love pretzels and portion control of meals is an issue.  Pt is a big carb eater and doesn't like vegetables much.  Wife endorses multiple prior diet educations but admits that the compliance is an issue.  Declines additional education at this time. Pt tests BS tid.  Takes Metformin and Glipizide at home and insulin coverage.  Pt /wife would benefit from consult with endocrinologist or DM ANP to review proper DM med administration.  It appears wife may be misusing Metformin to bring down pts BS after testing before a meal.  Wife states last a1c level was ~7.1% at PMD office.

## 2019-10-23 NOTE — PROGRESS NOTE ADULT - PROBLEM SELECTOR PLAN 3
improving, s/p adenosine  apprec cardio recs  monitor on tele
Persistently hyperglycemic (B+) despite NPO status and q 6 hour sliding scale coverage. IV steroids likely contributing. F/u next accu-check and will consider adding additional coverage on top of sliding scale or possibly insulin infusion.

## 2019-10-23 NOTE — PROGRESS NOTE ADULT - ASSESSMENT
cont rx PATIENT COMMODORE YUE  1940 DOA 10/22/2019 [facility] P [phone]                          PULMONARY/CRITICAL CARE ASSESSMENT/RECOMMENDATIONS                       AC HYPERCAPNIC RESP FAILURE   10/23/2019 6a bpap /8/.3 740/52/138   A/R  Improved with bpap    RESP TRACT INFECTION  10/22/2019 W 16.3   10/22/2019 pc n    10/22/2019 flu ab n rsv n    Doubty bacterial infection Leukocytosis may be sec steroids   10/23/2019 azithro started    COPD ex   Duoneb (10/22)   Pulmicort (10/22)  solumed 40.3 (10/22)  chngd pred 40 (10/23)    Cont rx         CAD  stent 2019  10/22-10/23 Tr 1 n.3    asa 81 (10/22)   plavix (10/22)   atorvastat (10/22)   Cont rx    RO VTE           10/22/2019 cta ch no pe   10/22/2019 v duplex n   VTE ruled out    DM  insulin (10/22)     DISPOSITION  Monitor resp failure status and need for intubation  Cardiac monitoring for arrhythmia and ischemia       TIME SPENT Over 25 minutes aggregate care time spent on encounter; activities included   direct pt care, counseling and/or coordinating care reviewing notes, lab data/ imaging , discussion with multidisciplinary team/ pt /family. Risks, benefits, alternatives  discussed in detail.

## 2019-10-23 NOTE — PROGRESS NOTE ADULT - PROBLEM SELECTOR PLAN 1
Secondary to COPD exacerbation. Actively titrating ventilator settings to maintain SaO2 > 88%. Now on increased setting of 24/8 to help achieve adequate tidal volumes. Serial blood gases to assess for improvement in ventilation. Keep HOB elevated > 30 degrees. Tenuous respiratory status- monitor closely- high risk for further decompensation/intubation given h/o advanced lung disease.
Of BIPAP  Tolerating 3L via NC   cont steroids and resp support, plan as per pulm/icu  CTA negative for PE  Continue IV antibiotics

## 2019-10-23 NOTE — PROGRESS NOTE ADULT - SUBJECTIVE AND OBJECTIVE BOX
[facility]    Saud Aguilar, Dani, Génesis, Medina, Carroll, Kumar      [phone]    CHIEF COMPLAINT: Patient is a 79y old  Male who presents with a chief complaint of lethargy, worsening shortness of breath and work of breathing (23 Oct 2019 09:51)      Follow Up: resp failure, svt    Interim history: bipap weaned off now on nasal cannula. reports no new symptoms. denies cp    MEDICATIONS  (STANDING):  albuterol/ipratropium for Nebulization. 3 milliLiter(s) Nebulizer every 4 hours  aspirin  chewable 81 milliGRAM(s) Oral daily  atorvastatin 40 milliGRAM(s) Oral at bedtime  azithromycin  IVPB 500 milliGRAM(s) IV Intermittent every 24 hours  azithromycin  IVPB      buDESOnide    Inhalation Suspension 0.5 milliGRAM(s) Inhalation two times a day  clopidogrel Tablet 75 milliGRAM(s) Oral daily  dextrose 5%. 1000 milliLiter(s) (50 mL/Hr) IV Continuous <Continuous>  dextrose 50% Injectable 12.5 Gram(s) IV Push once  dextrose 50% Injectable 25 Gram(s) IV Push once  dextrose 50% Injectable 25 Gram(s) IV Push once  enoxaparin Injectable 40 milliGRAM(s) SubCutaneous daily  insulin lispro (HumaLOG) corrective regimen sliding scale   SubCutaneous every 6 hours  methylPREDNISolone sodium succinate Injectable 40 milliGRAM(s) IV Push every 8 hours    MEDICATIONS  (PRN):  dextrose 40% Gel 15 Gram(s) Oral once PRN Blood Glucose LESS THAN 70 milliGRAM(s)/deciliter  glucagon  Injectable 1 milliGRAM(s) IntraMuscular once PRN Glucose LESS THAN 70 milligrams/deciliter      REVIEW OF SYSTEMS:  eye, ent, GI, , allergic, dermatologic, musculoskeletal and neurologic are negative except as described above    Vital Signs Last 24 Hrs  T(C): 36.8 (23 Oct 2019 07:36), Max: 36.9 (22 Oct 2019 15:42)  T(F): 98.2 (23 Oct 2019 07:36), Max: 98.5 (22 Oct 2019 15:42)  HR: 83 (23 Oct 2019 09:00) (62 - 125)  BP: 116/61 (23 Oct 2019 09:00) (100/68 - 134/80)  BP(mean): 82 (23 Oct 2019 09:00) (79 - 98)  RR: 26 (23 Oct 2019 09:00) (9 - 28)  SpO2: 100% (23 Oct 2019 09:00) (99% - 100%)    I&O's Summary    22 Oct 2019 07:01  -  23 Oct 2019 07:00  --------------------------------------------------------  IN: 250 mL / OUT: 1600 mL / NET: -1350 mL        Telemetry past 24h: sr    PHYSICAL EXAM:    Constitutional: well-nourished, well-developed, NAD   HEENT:  MMM, sclerae anicteric, conjunctivae clear, no oral cyanosis.  Pulmonary: Non-labored, breath sounds are clear bilaterally, No wheezing, rales or rhonchi  Cardiovascular: Regular, S1 and S2.  No murmur.  No rubs, gallops or clicks  Gastrointestinal: Bowel Sounds present, soft, nontender.   Lymph: mild peripheral edema.   Neurological: Alert, no focal deficits  Skin: No rashes.  Psych:  Mood & affect appropriate    LABS: All Labs Reviewed:                        10.4   9.61  )-----------( 234      ( 23 Oct 2019 05:57 )             34.6                         12.3   16.37 )-----------( 274      ( 22 Oct 2019 08:04 )             40.7     23 Oct 2019 05:57    142    |  104    |  20     ----------------------------<  262    5.4     |  34     |  1.30   22 Oct 2019 08:04    141    |  104    |  17     ----------------------------<  285    4.4     |  32     |  1.10     Ca    9.6        23 Oct 2019 05:57  Ca    9.5        22 Oct 2019 08:04  Phos  3.6       23 Oct 2019 05:57  Phos  4.5       22 Oct 2019 08:04  Mg     2.4       23 Oct 2019 05:57  Mg     1.8       22 Oct 2019 08:04    TPro  7.0    /  Alb  3.6    /  TBili  0.3    /  DBili  x      /  AST  23     /  ALT  36     /  AlkPhos  91     22 Oct 2019 08:04    PT/INR - ( 23 Oct 2019 05:57 )   PT: 11.5 sec;   INR: 1.02 ratio         PTT - ( 23 Oct 2019 05:57 )  PTT:30.5 sec  CARDIAC MARKERS ( 23 Oct 2019 05:57 )  <.015 ng/mL / x     / x     / x     / x      CARDIAC MARKERS ( 22 Oct 2019 22:42 )  <.015 ng/mL / x     / x     / x     / x      CARDIAC MARKERS ( 22 Oct 2019 15:10 )  .022 ng/mL / x     / x     / x     / x      CARDIAC MARKERS ( 22 Oct 2019 08:04 )  <.015 ng/mL / x     / 58 U/L / x     / 4.1 ng/mL      Blood Culture:   10-22 @ 08:04  Pro Bnp 166        RADIOLOGY:    EKG:    Echo:

## 2019-10-23 NOTE — PROGRESS NOTE ADULT - SUBJECTIVE AND OBJECTIVE BOX
INTERVAL HPI/OVERNIGHT EVENTS:   80 y/o F with a h/o HTN, COPD on 2L home O2 and nocturnal BiPAP, HLD, T2DM, CAD s/p CABG x3 in 2004 and recent stent 8/19, remote hx of osteomyelitis in femur, prior cardiac arrest 6/2018 in setting of acute hypoxic/hypercarbic respiratory failure secondary to asthma exacerbation, recent hospitalization here in [facility] ICU for AECOPD and Afib with RVR- discharged 4 days ago, admitted on 10/22 with SVT and acute respiratory distress. Pt initially tried home BiPAP and NEB treatment which did not relieve SOB. Wife took vitals via mobile pulse-ox and was found to have HR in 160's, she called EMS. Patient was found to be in SVT and SVT broken with adenosine. ABG revealed respiratory acidosis secondary to hypercapnia (7.22/79/184/26), subsequently placed on BiPAP and transferred to ICU. No acute lung infiltrates or evidence of PE on CTA chest.     SUBJECTIVE: Patient seen and examined at bedside.     ROS:  CV: Denies chest pain  Resp: Denies SOB  GI: Denies abdominal pain, constipation, diarrhea, nausea, vomiting  : Denies dysuria  ID: Denies fevers, chills  MSK: Denies joint pain     OBJECTIVE:    VITAL SIGNS:  ICU Vital Signs Last 24 Hrs  T(C): 36.8 (23 Oct 2019 04:26), Max: 36.9 (22 Oct 2019 15:42)  T(F): 98.3 (23 Oct 2019 04:26), Max: 98.5 (22 Oct 2019 15:42)  HR: 88 (23 Oct 2019 06:19) (66 - 144)  BP: 134/80 (23 Oct 2019 06:00) (100/68 - 203/89)  BP(mean): 98 (23 Oct 2019 06:00) (79 - 98)  ABP: --  ABP(mean): --  RR: 22 (23 Oct 2019 06:00) (9 - 28)  SpO2: 99% (23 Oct 2019 06:19) (99% - 100%)        10-22 @ 07:01  -  10-23 @ 07:00  --------------------------------------------------------  IN: 250 mL / OUT: 1600 mL / NET: -1350 mL      CAPILLARY BLOOD GLUCOSE  274 (22 Oct 2019 08:25)      POCT Blood Glucose.: 255 mg/dL (23 Oct 2019 05:42)      PHYSICAL EXAM:    General: mild-mod resp distress.  Alert, interactive, on BiPAP  HEENT: NCAT, PERRL, clear conjunctiva, no scleral icterus  Neck: supple, no JVD  Respiratory: diffusely diminished bilaterally, poor air movement, no significant sputum production  Cardiovascular: RRR, normal S1S2, no M/R/G  Abdomen: soft, NT/ND, bowel sounds in all four quadrants, no palpable masses  Extremities: WWP, no clubbing, cyanosis, or edema  Neuro: A&Ox3, strength 5/5 all extremities, cranial nerves grossly intact, no focal deficits    MEDICATIONS:  MEDICATIONS  (STANDING):  albuterol/ipratropium for Nebulization. 3 milliLiter(s) Nebulizer every 4 hours  aspirin  chewable 81 milliGRAM(s) Oral daily  atorvastatin 40 milliGRAM(s) Oral at bedtime  azithromycin  IVPB 500 milliGRAM(s) IV Intermittent every 24 hours  azithromycin  IVPB      buDESOnide    Inhalation Suspension 0.5 milliGRAM(s) Inhalation two times a day  clopidogrel Tablet 75 milliGRAM(s) Oral daily  dextrose 5%. 1000 milliLiter(s) (50 mL/Hr) IV Continuous <Continuous>  dextrose 50% Injectable 12.5 Gram(s) IV Push once  dextrose 50% Injectable 25 Gram(s) IV Push once  dextrose 50% Injectable 25 Gram(s) IV Push once  enoxaparin Injectable 40 milliGRAM(s) SubCutaneous daily  insulin lispro (HumaLOG) corrective regimen sliding scale   SubCutaneous every 6 hours  methylPREDNISolone sodium succinate Injectable 40 milliGRAM(s) IV Push every 8 hours    MEDICATIONS  (PRN):  dextrose 40% Gel 15 Gram(s) Oral once PRN Blood Glucose LESS THAN 70 milliGRAM(s)/deciliter  glucagon  Injectable 1 milliGRAM(s) IntraMuscular once PRN Glucose LESS THAN 70 milligrams/deciliter      ALLERGIES:  Allergies    No Known Allergies    Intolerances    shellfish (Nausea)      LABS:                        10.4   9.61  )-----------( 234      ( 23 Oct 2019 05:57 )             34.6     10-23    142  |  104  |  20  ----------------------------<  262<H>  5.4<H>   |  34<H>  |  1.30    Ca    9.6      23 Oct 2019 05:57  Phos  4.5     10-22  Mg     1.8     10-22    TPro  7.0  /  Alb  3.6  /  TBili  0.3  /  DBili  x   /  AST  23  /  ALT  36  /  AlkPhos  91  10-22    PT/INR - ( 23 Oct 2019 05:57 )   PT: 11.5 sec;   INR: 1.02 ratio         PTT - ( 23 Oct 2019 05:57 )  PTT:30.5 sec      RADIOLOGY & ADDITIONAL TESTS: Reviewed.

## 2019-10-23 NOTE — PROGRESS NOTE ADULT - SUBJECTIVE AND OBJECTIVE BOX
80 yo Male with PMHx of COPD on home Bipap, CAD, CABG in 2004, recent stent in 8/2019, HTN, DM II A1C 7.2, with recent admit to [facility] for Afib with RVR d/c'd 4 days ago, now admit with -200s w/ break after Adenosine 6mg & 12mgs consecutively. Aphasic on admission with c/o b/l LE weakness - CT of head negative. Respiratory acidosis likely due to hypercapnia on Bipap - CT of chest negative for PE or infiltrates.    24 hour events: Bipap over night with resolving acidosis with compensation; Hyperglycemia >250 on ISS    Review of Systems:  Constitutional: No fever, chills, fatigue  Neuro: No headache, numbness, weakness  Resp: No cough, wheezing, shortness of breath  CVS: No chest pain, palpitations, leg swelling  GI: No abdominal pain, nausea, vomiting, diarrhea   : No dysuria, frequency, incontinence  Skin: No itching, burning, rashes, or lesions   Msk: No joint pain or swelling  Psych: No depression, anxiety, mood swings    T(F): 98.3 (10-23-19 @ 04:26), Max: 98.5 (10-22-19 @ 15:42)  HR: 78 (10-23-19 @ 07:49) (66 - 144)  BP: 111/62 (10-23-19 @ 07:00) (100/68 - 182/80)  RR: 22 (10-23-19 @ 07:00) (9 - 28)  SpO2: 100% (10-23-19 @ 07:49) (99% - 100%)  Wt(kg): --      10-22-19 @ 07:01  -  10-23-19 @ 07:00  --------------------------------------------------------  IN: 250 mL / OUT: 1600 mL / NET: -1350 mL        CAPILLARY BLOOD GLUCOSE  274 (22 Oct 2019 08:25)      POCT Blood Glucose.: 255 mg/dL (23 Oct 2019 05:42)      I&O's Summary    22 Oct 2019 07:01  -  23 Oct 2019 07:00  --------------------------------------------------------  IN: 250 mL / OUT: 1600 mL / NET: -1350 mL        Physical Exam:   Gen:  Neuro:  HEENT:  Resp:  CVS:  Abd:  Ext:  Skin:    Meds:  azithromycin  IVPB IV Intermittent  azithromycin  IVPB       atorvastatin Oral  dextrose 40% Gel Oral PRN  dextrose 50% Injectable IV Push  dextrose 50% Injectable IV Push  dextrose 50% Injectable IV Push  glucagon  Injectable IntraMuscular PRN  insulin lispro (HumaLOG) corrective regimen sliding scale SubCutaneous  methylPREDNISolone sodium succinate Injectable IV Push    albuterol/ipratropium for Nebulization. Nebulizer  buDESOnide    Inhalation Suspension Inhalation        aspirin  chewable Oral  clopidogrel Tablet Oral  enoxaparin Injectable SubCutaneous        dextrose 5%. IV Continuous                                  10.4   9.61  )-----------( 234      ( 23 Oct 2019 05:57 )             34.6     Bands 7.0    10-23    142  |  104  |  20  ----------------------------<  262<H>  5.4<H>   |  34<H>  |  1.30    Ca    9.6      23 Oct 2019 05:57  Phos  4.5     10-22  Mg     1.8     10-22    TPro  7.0  /  Alb  3.6  /  TBili  0.3  /  DBili  x   /  AST  23  /  ALT  36  /  AlkPhos  91  10-22      CARDIAC MARKERS ( 23 Oct 2019 05:57 )  <.015 ng/mL / x     / x     / x     / x      CARDIAC MARKERS ( 22 Oct 2019 22:42 )  <.015 ng/mL / x     / x     / x     / x      CARDIAC MARKERS ( 22 Oct 2019 15:10 )  .022 ng/mL / x     / x     / x     / x      CARDIAC MARKERS ( 22 Oct 2019 08:04 )  <.015 ng/mL / x     / 58 U/L / x     / 4.1 ng/mL      PT/INR - ( 23 Oct 2019 05:57 )   PT: 11.5 sec;   INR: 1.02 ratio         PTT - ( 23 Oct 2019 05:57 )  PTT:30.5 sec        Rapid RVP Result: Julissatec (10-22 @ 19:15)          Radiology: ***  Bedside ultrasound: ***    CENTRAL LINE: N/Y          DATE INSERTED:              REMOVE: Y/N  SHARMA: N/Y                       DATE INSERTED:              REMOVE: Y/N  A-LINE: N/Y                       DATE INSERTED:              REMOVE: Y/N    GLOBAL ISSUE/BEST PRACTICE:  Analgesia:  Sedation:  CAM-ICU:   HOB elevation: yes  Stress ulcer prophylaxis:  VTE prophylaxis:  Glycemic control:  Nutrition:    CODE STATUS: *** 78 yo Male with PMHx of COPD on home Bipap, CAD, CABG in 2004, recent stent in 8/2019, HTN, DM II A1C 7.2, with recent admit to [facility] for Afib with RVR d/c'd 4 days ago, now admit with -200s w/ break after Adenosine 6mg & 12mgs consecutively. Aphasic on admission with c/o b/l LE weakness - CT of head negative. Respiratory acidosis likely due to hypercapnia on Bipap - CT of chest negative for PE or infiltrates.    24 hour events: Bipap over night with resolving acidosis with compensation; Hyperglycemia >250 on ISS    Review of Systems:  Constitutional: No fever, chills, fatigue  Neuro: No headache, numbness, weakness  Resp: No cough, wheezing, shortness of breath  CVS: No chest pain, palpitations, leg swelling  GI: No abdominal pain, nausea, vomiting, diarrhea   : No dysuria, frequency, incontinence  Skin: No itching, burning, rashes, or lesions   Msk: No joint pain or swelling  Psych: No depression, anxiety, mood swings    Vital Signs Last 24 Hrs  T(C): 36.8 (23 Oct 2019 04:26), Max: 36.9 (22 Oct 2019 15:42)  T(F): 98.3 (23 Oct 2019 04:26), Max: 98.5 (22 Oct 2019 15:42)  HR: 78 (23 Oct 2019 07:49) (66 - 144)  BP: 111/62 (23 Oct 2019 07:00) (100/68 - 134/80)  BP(mean): 81 (23 Oct 2019 07:00) (79 - 98)  RR: 22 (23 Oct 2019 07:00) (9 - 28)  SpO2: 100% (23 Oct 2019 07:49) (99% - 100%)      10-22-19 @ 07:01  -  10-23-19 @ 07:00  --------------------------------------------------------  IN: 250 mL / OUT: 1600 mL / NET: -1350 mL      POCT Blood Glucose.: 255 mg/dL (23 Oct 2019 05:42)      Physical Exam:   Gen:  Neuro:  HEENT:  Resp:  CVS:  Abd:  Ext:  Skin:    Meds:  azithromycin  IVPB IV Intermittent  azithromycin  IVPB   atorvastatin Oral  dextrose 40% Gel Oral PRN  dextrose 50% Injectable IV Push  dextrose 50% Injectable IV Push  dextrose 50% Injectable IV Push  glucagon  Injectable IntraMuscular PRN  insulin lispro (HumaLOG) corrective regimen sliding scale SubCutaneous  methylPREDNISolone sodium succinate Injectable IV Push  albuterol/ipratropium for Nebulization. Nebulizer  buDESOnide    Inhalation Suspension Inhalation  aspirin  chewable Oral  clopidogrel Tablet Oral  enoxaparin Injectable SubCutaneous  dextrose 5%. IV Continuous                                  10.4   9.61  )-----------( 234      ( 23 Oct 2019 05:57 )             34.6     Bands 7.0    10-23    142  |  104  |  20  ----------------------------<  262<H>  5.4<H>   |  34<H>  |  1.30    Ca    9.6      23 Oct 2019 05:57  Phos  4.5     10-22  Mg     1.8     10-22    TPro  7.0  /  Alb  3.6  /  TBili  0.3  /  DBili  x   /  AST  23  /  ALT  36  /  AlkPhos  91  10-22      CARDIAC MARKERS ( 23 Oct 2019 05:57 )  <.015 ng/mL / x     / x     / x     / x      CARDIAC MARKERS ( 22 Oct 2019 22:42 )  <.015 ng/mL / x     / x     / x     / x      CARDIAC MARKERS ( 22 Oct 2019 15:10 )  .022 ng/mL / x     / x     / x     / x      CARDIAC MARKERS ( 22 Oct 2019 08:04 )  <.015 ng/mL / x     / 58 U/L / x     / 4.1 ng/mL      PT/INR - ( 23 Oct 2019 05:57 )   PT: 11.5 sec;   INR: 1.02 ratio    PTT - ( 23 Oct 2019 05:57 )  PTT:30.5 sec      Rapid RVP Result: NotDetec (10-22 @ 19:15)    CT Head No Cont (10.22.19 @ 08:17)   Comparison: CT scan 10/28/2018.    Motion artifact degrades image quality limiting evaluation.  There is cerebral volume loss, without mass effect or midline shift.  Periventricular white matter low attenuation is most compatible with   chronic microvascular ischemic disease.  No gross acute intraparenchymal hemorrhage is noted.    Again noted is approximately 2.62 cm partially calcified extra-axial mass   left parietal region; stable in appearance, likely reflecting a   meningioma.    Impression:    Motion degraded exam.  No gross acute intracranial hemorrhage or mass effect noted.    Xray Chest 1 View- PORTABLE-Urgent (10.22.19 @ 08:42)   Comparison:10/16/2019.    Findings:  Lines: None    Heart/Mediastinum/Lungs: The heart size is normal.The lungs are   clear.There are no pleural effusions.    Impression: Clear lungs.      CT Angio Chest w/ IV Cont (10.22.19 @ 11:44)     FINDINGS:    LUNGS AND AIRWAYS: Patent central airways.  There is mild diffuse changes   of centrilobular emphysema. There are scattered areas of linear scarring   in both lung bases, lingula and right middle lobe. There is a stable   punctate 1 mm calcified nodule anteriorly in the right upper lobe on   image #44/series 2. There are areas of mucus impaction of the distal   airways to the lower lobes.    PLEURA: No pleural effusion.    MEDIASTINUM AND FRANKLYN: No lymphadenopathy.    VESSELS: There is no pulmonary embolism. The thoracic aorta and main   pulmonary artery are normal in caliber. There is coronary artery   calcification. Patient is status post CABG.    HEART: Heart size is normal. No pericardial effusion.    CHEST WALL AND LOWER NECK: The thyroid gland is within normal limits.   There is no supraclavicular or axillary lymphadenopathy.    VISUALIZED UPPER ABDOMEN: The adrenal glands are within normal limits.   The imaged portions of the liver, spleen, pancreas and kidneys are   unremarkable. There is a small gallstone within the imaged gallbladder.    BONES: Multilevel degenerative change of the thoracic spine with   scattered osteophytes and degenerative disc disease. Median sternotomy   wires.    IMPRESSION:     No pulmonary embolism.    Stable changes of emphysema.    Stable areas of mucosal impaction in the distal small airways to the   lower lobes.      Bedside ultrasound: ***    CENTRAL LINE: N           SHARMA: N              A-LINE: N                          GLOBAL ISSUE/BEST PRACTICE:  Analgesia: N/A  Sedation: N/A  CAM-ICU:   HOB elevation: yes  Stress ulcer prophylaxis: N  VTE prophylaxis: Y  Glycemic control: In progress  Nutrition: NPO    CODE STATUS: Full Code 78 yo Male with PMHx of COPD on home Bipap, CAD, CABG in 2004, recent stent in 8/2019, HTN, DM II A1C 7.2, with recent admit to [facility] for Afib with RVR d/c'd 4 days ago, now admit with -200s (w/ break after Adenosine 6mg & 12mgs consecutively) and acute exacerbation of COPD. Respiratory acidosis likely due to hypercapnia, on Bipap - CT of chest negative for PE or infiltrates.    24 hour events: Bipap over night with resolving acidosis with compensation; Hyperglycemia >250 on ISS    Review of Systems:  Constitutional: No fever, chills, fatigue  Neuro: No headache, numbness, weakness  Resp: No cough or shortness of breath  CVS: No chest pain, palpitations, leg swelling  GI: No abdominal pain, nausea, vomiting, diarrhea   : No dysuria, frequency, incontinence  Skin: No itching, burning, rashes, or lesions   Msk: No joint pain or swelling  Psych: No depression, anxiety, mood swings    Vital Signs Last 24 Hrs  T(C): 36.8 (23 Oct 2019 04:26), Max: 36.9 (22 Oct 2019 15:42)  T(F): 98.3 (23 Oct 2019 04:26), Max: 98.5 (22 Oct 2019 15:42)  HR: 78 (23 Oct 2019 07:49) (66 - 144)  BP: 111/62 (23 Oct 2019 07:00) (100/68 - 134/80)  BP(mean): 81 (23 Oct 2019 07:00) (79 - 98)  RR: 22 (23 Oct 2019 07:00) (9 - 28)  SpO2: 100% (23 Oct 2019 07:49) (99% - 100%)      10-22-19 @ 07:01  -  10-23-19 @ 07:00  --------------------------------------------------------  IN: 250 mL / OUT: 1600 mL / NET: -1350 mL      POCT Blood Glucose.: 255 mg/dL (23 Oct 2019 05:42)      Physical Exam  Gen: Well groomed, no apparent distress  Neuro: Alert and Oriented X4, PERRLA, EOMI, Strength 5/5 on all extremities, conjunctiva and sclera are clear, no icterus  HEENT:   Resp:  CVS:  Abd:  Ext:  Skin:    Meds:  azithromycin  IVPB IV Intermittent  azithromycin  IVPB   atorvastatin Oral  dextrose 40% Gel Oral PRN  dextrose 50% Injectable IV Push  dextrose 50% Injectable IV Push  dextrose 50% Injectable IV Push  glucagon  Injectable IntraMuscular PRN  insulin lispro (HumaLOG) corrective regimen sliding scale SubCutaneous  methylPREDNISolone sodium succinate Injectable IV Push  albuterol/ipratropium for Nebulization. Nebulizer  buDESOnide    Inhalation Suspension Inhalation  aspirin  chewable Oral  clopidogrel Tablet Oral  enoxaparin Injectable SubCutaneous  dextrose 5%. IV Continuous                                  10.4   9.61  )-----------( 234      ( 23 Oct 2019 05:57 )             34.6     Bands 7.0    10-23    142  |  104  |  20  ----------------------------<  262<H>  5.4<H>   |  34<H>  |  1.30    Ca    9.6      23 Oct 2019 05:57  Phos  4.5     10-22  Mg     1.8     10-22    TPro  7.0  /  Alb  3.6  /  TBili  0.3  /  DBili  x   /  AST  23  /  ALT  36  /  AlkPhos  91  10-22      CARDIAC MARKERS ( 23 Oct 2019 05:57 )  <.015 ng/mL / x     / x     / x     / x      CARDIAC MARKERS ( 22 Oct 2019 22:42 )  <.015 ng/mL / x     / x     / x     / x      CARDIAC MARKERS ( 22 Oct 2019 15:10 )  .022 ng/mL / x     / x     / x     / x      CARDIAC MARKERS ( 22 Oct 2019 08:04 )  <.015 ng/mL / x     / 58 U/L / x     / 4.1 ng/mL      PT/INR - ( 23 Oct 2019 05:57 )   PT: 11.5 sec;   INR: 1.02 ratio    PTT - ( 23 Oct 2019 05:57 )  PTT:30.5 sec      Rapid RVP Result: NotDetec (10-22 @ 19:15)    CT Head No Cont (10.22.19 @ 08:17)   Comparison: CT scan 10/28/2018.    Motion artifact degrades image quality limiting evaluation.  There is cerebral volume loss, without mass effect or midline shift.  Periventricular white matter low attenuation is most compatible with   chronic microvascular ischemic disease.  No gross acute intraparenchymal hemorrhage is noted.    Again noted is approximately 2.62 cm partially calcified extra-axial mass   left parietal region; stable in appearance, likely reflecting a   meningioma.    Impression:    Motion degraded exam.  No gross acute intracranial hemorrhage or mass effect noted.    Xray Chest 1 View- PORTABLE-Urgent (10.22.19 @ 08:42)   Comparison:10/16/2019.    Findings:  Lines: None    Heart/Mediastinum/Lungs: The heart size is normal.The lungs are   clear.There are no pleural effusions.    Impression: Clear lungs.      CT Angio Chest w/ IV Cont (10.22.19 @ 11:44)     FINDINGS:    LUNGS AND AIRWAYS: Patent central airways.  There is mild diffuse changes   of centrilobular emphysema. There are scattered areas of linear scarring   in both lung bases, lingula and right middle lobe. There is a stable   punctate 1 mm calcified nodule anteriorly in the right upper lobe on   image #44/series 2. There are areas of mucus impaction of the distal   airways to the lower lobes.    PLEURA: No pleural effusion.    MEDIASTINUM AND FRANKLYN: No lymphadenopathy.    VESSELS: There is no pulmonary embolism. The thoracic aorta and main   pulmonary artery are normal in caliber. There is coronary artery   calcification. Patient is status post CABG.    HEART: Heart size is normal. No pericardial effusion.    CHEST WALL AND LOWER NECK: The thyroid gland is within normal limits.   There is no supraclavicular or axillary lymphadenopathy.    VISUALIZED UPPER ABDOMEN: The adrenal glands are within normal limits.   The imaged portions of the liver, spleen, pancreas and kidneys are   unremarkable. There is a small gallstone within the imaged gallbladder.    BONES: Multilevel degenerative change of the thoracic spine with   scattered osteophytes and degenerative disc disease. Median sternotomy   wires.    IMPRESSION:     No pulmonary embolism.    Stable changes of emphysema.    Stable areas of mucosal impaction in the distal small airways to the   lower lobes.      Bedside ultrasound: ***    CENTRAL LINE: N           SHARMA: N              A-LINE: N                          GLOBAL ISSUE/BEST PRACTICE:  Analgesia: N/A  Sedation: N/A  CAM-ICU:   HOB elevation: yes  Stress ulcer prophylaxis: N  VTE prophylaxis: Y  Glycemic control: In progress  Nutrition: NPO    CODE STATUS: Full Code 80 yo Male with PMHx of COPD on home Bipap, CAD, CABG in 2004, recent stent in 8/2019, HTN, DM II A1C 7.2, with recent admit to [facility] for Afib with RVR d/c'd 4 days ago, now admit with -200s (w/ break after Adenosine 6mg & 12mgs consecutively) and acute exacerbation of COPD with respiratory acidosis likely due to hypercapnia, on Bipap - CT of chest negative for PE or infiltrates.    24 hour events: Bipap over night with resolving acidosis with compensation; Hyperglycemia >250 on ISS    Review of Systems:  Constitutional: No fever, chills, fatigue  Neuro: No headache, numbness, weakness  Resp: No cough or shortness of breath  CVS: No chest pain, palpitations, leg swelling  GI: No abdominal pain, nausea, vomiting, diarrhea   : No dysuria, frequency, incontinence  Skin: No itching, burning, rashes, or lesions   Msk: No joint pain or swelling  Psych: No depression, anxiety, mood swings    Vital Signs Last 24 Hrs  T(C): 36.8 (23 Oct 2019 04:26), Max: 36.9 (22 Oct 2019 15:42)  T(F): 98.3 (23 Oct 2019 04:26), Max: 98.5 (22 Oct 2019 15:42)  HR: 78 (23 Oct 2019 07:49) (66 - 144)  BP: 111/62 (23 Oct 2019 07:00) (100/68 - 134/80)  BP(mean): 81 (23 Oct 2019 07:00) (79 - 98)  RR: 22 (23 Oct 2019 07:00) (9 - 28)  SpO2: 100% (23 Oct 2019 07:49) (99% - 100%)      10-22-19 @ 07:01  -  10-23-19 @ 07:00  --------------------------------------------------------  IN: 250 mL / OUT: 1600 mL / NET: -1350 mL      POCT Blood Glucose.: 255 mg/dL (23 Oct 2019 05:42)      Physical Exam  Gen: Well groomed, no apparent distress  Neuro: Alert and Oriented X4, PERRLA, EOMI, Strength 5/5 on all extremities, conjunctiva and sclera are clear, no icterus  HEENT: Normocephalic, Atraumatic, Neck supple  Resp: b/l expiratory wheezes, no rales, rhonchi or crackles  CVS: regular rate and rhythm, no murrmurs or rubs  Abd: +BS, soft, non-tender, non-distended  Ext & Skin: warm, no edema, well perfused, + palpable pulses on all extremities.     Meds:  azithromycin  IVPB IV Intermittent  azithromycin  IVPB   atorvastatin Oral  dextrose 40% Gel Oral PRN  dextrose 50% Injectable IV Push  dextrose 50% Injectable IV Push  dextrose 50% Injectable IV Push  glucagon  Injectable IntraMuscular PRN  insulin lispro (HumaLOG) corrective regimen sliding scale SubCutaneous  methylPREDNISolone sodium succinate Injectable IV Push  albuterol/ipratropium for Nebulization. Nebulizer  buDESOnide    Inhalation Suspension Inhalation  aspirin  chewable Oral  clopidogrel Tablet Oral  enoxaparin Injectable SubCutaneous  dextrose 5%. IV Continuous                          10.4   9.61  )-----------( 234      ( 23 Oct 2019 05:57 )             34.6     Bands 7.0    10-23    142  |  104  |  20  ----------------------------<  262<H>  5.4<H>   |  34<H>  |  1.30    Ca    9.6      23 Oct 2019 05:57  Phos  4.5     10-22  Mg     1.8     10-22    TPro  7.0  /  Alb  3.6  /  TBili  0.3  /  DBili  x   /  AST  23  /  ALT  36  /  AlkPhos  91  10-22      CARDIAC MARKERS ( 23 Oct 2019 05:57 )  <.015 ng/mL / x     / x     / x     / x      CARDIAC MARKERS ( 22 Oct 2019 22:42 )  <.015 ng/mL / x     / x     / x     / x      CARDIAC MARKERS ( 22 Oct 2019 15:10 )  .022 ng/mL / x     / x     / x     / x      CARDIAC MARKERS ( 22 Oct 2019 08:04 )  <.015 ng/mL / x     / 58 U/L / x     / 4.1 ng/mL      PT/INR - ( 23 Oct 2019 05:57 )   PT: 11.5 sec;   INR: 1.02 ratio    PTT - ( 23 Oct 2019 05:57 )  PTT:30.5 sec      Rapid RVP Result: NotDetec (10-22 @ 19:15)    CT Head No Cont (10.22.19 @ 08:17)   Comparison: CT scan 10/28/2018.    Motion artifact degrades image quality limiting evaluation.  There is cerebral volume loss, without mass effect or midline shift.  Periventricular white matter low attenuation is most compatible with   chronic microvascular ischemic disease.  No gross acute intraparenchymal hemorrhage is noted.    Again noted is approximately 2.62 cm partially calcified extra-axial mass   left parietal region; stable in appearance, likely reflecting a   meningioma.    Impression:    Motion degraded exam.  No gross acute intracranial hemorrhage or mass effect noted.    Xray Chest 1 View- PORTABLE-Urgent (10.22.19 @ 08:42)   Comparison:10/16/2019.    Findings:  Lines: None    Heart/Mediastinum/Lungs: The heart size is normal.The lungs are   clear.There are no pleural effusions.    Impression: Clear lungs.      CT Angio Chest w/ IV Cont (10.22.19 @ 11:44)     FINDINGS:    LUNGS AND AIRWAYS: Patent central airways.  There is mild diffuse changes   of centrilobular emphysema. There are scattered areas of linear scarring   in both lung bases, lingula and right middle lobe. There is a stable   punctate 1 mm calcified nodule anteriorly in the right upper lobe on   image #44/series 2. There are areas of mucus impaction of the distal   airways to the lower lobes.    PLEURA: No pleural effusion.    MEDIASTINUM AND FRANKLYN: No lymphadenopathy.    VESSELS: There is no pulmonary embolism. The thoracic aorta and main   pulmonary artery are normal in caliber. There is coronary artery   calcification. Patient is status post CABG.    HEART: Heart size is normal. No pericardial effusion.    CHEST WALL AND LOWER NECK: The thyroid gland is within normal limits.   There is no supraclavicular or axillary lymphadenopathy.    VISUALIZED UPPER ABDOMEN: The adrenal glands are within normal limits.   The imaged portions of the liver, spleen, pancreas and kidneys are   unremarkable. There is a small gallstone within the imaged gallbladder.    BONES: Multilevel degenerative change of the thoracic spine with   scattered osteophytes and degenerative disc disease. Median sternotomy   wires.    IMPRESSION:     No pulmonary embolism.    Stable changes of emphysema.    Stable areas of mucosal impaction in the distal small airways to the   lower lobes.      CENTRAL LINE: N           SHARMA: N              A-LINE: N                          GLOBAL ISSUE/BEST PRACTICE:  Analgesia: N/A  Sedation: N/A  CAM-ICU: negative  HOB elevation: yes  Stress ulcer prophylaxis: N  VTE prophylaxis: Y  Glycemic control: In progress  Nutrition: Consistent Carb diet    CODE STATUS: Full Code 78 yo Male with PMHx of COPD on home Bipap, CAD, CABG in 2004, recent stent in 8/2019, HTN, DM II A1C 7.2, with recent admit to [facility] for Afib with RVR d/c'd 4 days ago, now admit with -200s (w/ break after Adenosine 6mg & 12mgs consecutively) and acute exacerbation of COPD with respiratory acidosis likely due to hypercapnia, on Bipap - CT of chest negative for PE or infiltrates.    24 hour events: Bipap over night with resolving acidosis with compensation; Hyperglycemia >250 on ISS    Review of Systems:  Constitutional: No fever, chills, fatigue  Neuro: No headache, numbness, weakness  Resp: No cough or shortness of breath  CVS: No chest pain, palpitations, leg swelling  GI: No abdominal pain, nausea, vomiting, diarrhea   : No dysuria, frequency, incontinence  Skin: No itching, burning, rashes, or lesions   Msk: No joint pain or swelling  Psych: No depression, anxiety, mood swings    Vital Signs Last 24 Hrs  T(C): 36.8 (23 Oct 2019 04:26), Max: 36.9 (22 Oct 2019 15:42)  T(F): 98.3 (23 Oct 2019 04:26), Max: 98.5 (22 Oct 2019 15:42)  HR: 78 (23 Oct 2019 07:49) (66 - 144)  BP: 111/62 (23 Oct 2019 07:00) (100/68 - 134/80)  BP(mean): 81 (23 Oct 2019 07:00) (79 - 98)  RR: 22 (23 Oct 2019 07:00) (9 - 28)  SpO2: 100% (23 Oct 2019 07:49) (99% - 100%)      10-22-19 @ 07:01  -  10-23-19 @ 07:00  --------------------------------------------------------  IN: 250 mL / OUT: 1600 mL / NET: -1350 mL      POCT Blood Glucose.: 255 mg/dL (23 Oct 2019 05:42)      Physical Exam  Gen: Well groomed, no apparent distress  Neuro: Alert and Oriented X4, PERRLA, EOMI, Strength 5/5 on all extremities, conjunctiva and sclera are clear, no icterus  HEENT: Normocephalic, Atraumatic, Neck supple  Resp: b/l expiratory wheezes, no rales, rhonchi or crackles  CVS: regular rate and rhythm, no murrmurs or rubs  Abd: +BS, soft, non-tender, non-distended  Ext & Skin: warm, no edema, well perfused, + palpable pulses on all extremities.     Meds:  azithromycin  IVPB IV Intermittent  azithromycin  IVPB   atorvastatin Oral  dextrose 40% Gel Oral PRN  dextrose 50% Injectable IV Push  dextrose 50% Injectable IV Push  dextrose 50% Injectable IV Push  glucagon  Injectable IntraMuscular PRN  insulin lispro (HumaLOG) corrective regimen sliding scale SubCutaneous  methylPREDNISolone sodium succinate Injectable IV Push  albuterol/ipratropium for Nebulization. Nebulizer  buDESOnide    Inhalation Suspension Inhalation  aspirin  chewable Oral  clopidogrel Tablet Oral  enoxaparin Injectable SubCutaneous  dextrose 5%. IV Continuous                          10.4   9.61  )-----------( 234      ( 23 Oct 2019 05:57 )             34.6     Bands 7.0    10-23    142  |  104  |  20  ----------------------------<  262<H>  5.4<H>   |  34<H>  |  1.30    Ca    9.6      23 Oct 2019 05:57  Phos  4.5     10-22  Mg     1.8     10-22    TPro  7.0  /  Alb  3.6  /  TBili  0.3  /  DBili  x   /  AST  23  /  ALT  36  /  AlkPhos  91  10-22      CARDIAC MARKERS ( 23 Oct 2019 05:57 ) <.015 ng/mL   CARDIAC MARKERS ( 22 Oct 2019 22:42 ) <.015 ng/mL   CARDIAC MARKERS ( 22 Oct 2019 15:10 ) .022 ng/mL  CARDIAC MARKERS ( 22 Oct 2019 08:04 ) <.015 ng/mL       PT/INR - ( 23 Oct 2019 05:57 )   PT: 11.5 sec;   INR: 1.02 ratio    PTT - ( 23 Oct 2019 05:57 )  PTT:30.5 sec      Rapid RVP Result: NotDetec (10-22 @ 19:15)      CT Head No Cont (10.22.19 @ 08:17)   Comparison: CT scan 10/28/2018.    Motion artifact degrades image quality limiting evaluation.  There is cerebral volume loss, without mass effect or midline shift.  Periventricular white matter low attenuation is most compatible with   chronic microvascular ischemic disease.  No gross acute intraparenchymal hemorrhage is noted.    Again noted is approximately 2.62 cm partially calcified extra-axial mass   left parietal region; stable in appearance, likely reflecting a   meningioma.    Impression:    Motion degraded exam.  No gross acute intracranial hemorrhage or mass effect noted.    Xray Chest 1 View- PORTABLE-Urgent (10.22.19 @ 08:42)   Comparison:10/16/2019.    Findings:  Lines: None    Heart/Mediastinum/Lungs: The heart size is normal.The lungs are   clear.There are no pleural effusions.    Impression: Clear lungs.      CT Angio Chest w/ IV Cont (10.22.19 @ 11:44)     FINDINGS:    LUNGS AND AIRWAYS: Patent central airways.  There is mild diffuse changes   of centrilobular emphysema. There are scattered areas of linear scarring   in both lung bases, lingula and right middle lobe. There is a stable   punctate 1 mm calcified nodule anteriorly in the right upper lobe on   image #44/series 2. There are areas of mucus impaction of the distal   airways to the lower lobes.    PLEURA: No pleural effusion.    MEDIASTINUM AND FRANKLYN: No lymphadenopathy.    VESSELS: There is no pulmonary embolism. The thoracic aorta and main   pulmonary artery are normal in caliber. There is coronary artery   calcification. Patient is status post CABG.    HEART: Heart size is normal. No pericardial effusion.    CHEST WALL AND LOWER NECK: The thyroid gland is within normal limits.   There is no supraclavicular or axillary lymphadenopathy.    VISUALIZED UPPER ABDOMEN: The adrenal glands are within normal limits.   The imaged portions of the liver, spleen, pancreas and kidneys are   unremarkable. There is a small gallstone within the imaged gallbladder.    BONES: Multilevel degenerative change of the thoracic spine with   scattered osteophytes and degenerative disc disease. Median sternotomy   wires.    IMPRESSION:     No pulmonary embolism.    Stable changes of emphysema.    Stable areas of mucosal impaction in the distal small airways to the   lower lobes.      CENTRAL LINE: N           SHARMA: N              A-LINE: N                          GLOBAL ISSUE/BEST PRACTICE:  Analgesia: N/A  Sedation: N/A  CAM-ICU: negative  HOB elevation: yes  Stress ulcer prophylaxis: N  VTE prophylaxis: Y  Glycemic control: In progress  Nutrition: Consistent Carb diet    CODE STATUS: Full Code

## 2019-10-23 NOTE — PROGRESS NOTE ADULT - ASSESSMENT
80 y/o F with a h/o HTN, COPD on 2L home O2 and nocturnal BiPAP, HLD, CAD, with acute mixed hypoxemic/hypercapnic respiratory failure, acute COPD exacerbation, hyperglycemia.

## 2019-10-23 NOTE — PROGRESS NOTE ADULT - PROBLEM SELECTOR PROBLEM 1
Acute respiratory failure with hypoxia and hypercapnia
Acute on chronic respiratory failure with hypercapnia

## 2019-10-23 NOTE — PROGRESS NOTE ADULT - SUBJECTIVE AND OBJECTIVE BOX
Pulmonary/Critical Care Followup    PAST MEDICAL & SURGICAL HISTORY:  PVD (peripheral vascular disease)  Hypertension  COPD (chronic obstructive pulmonary disease): on 2L at home and BiPAP at night; intubated   Osteomyelitis  Dyslipidemia  CAD (Coronary Artery Disease): s/p 3v CABG ; stents placed in [place]   Diabetes Mellitus, Type II  S/P primary angioplasty with coronary stent  Compound fracture: left leg  CABG (Coronary Artery Bypass Graft):       Allergies    No Known Allergies    Intolerances    shellfish (Nausea)      FAMILY HISTORY:  Family hx of lung cancer: brother,  age 82, used to smoke with pt  Family history of diabetes mellitus (Sibling)      SOCIAL HISTORY:      MEDICATIONS  (STANDING):  albuterol/ipratropium for Nebulization. 3 milliLiter(s) Nebulizer every 6 hours  aspirin  chewable 81 milliGRAM(s) Oral daily  atorvastatin 40 milliGRAM(s) Oral at bedtime  azithromycin   Tablet 500 milliGRAM(s) Oral every 24 hours  buDESOnide    Inhalation Suspension 0.5 milliGRAM(s) Inhalation two times a day  clopidogrel Tablet 75 milliGRAM(s) Oral daily  dextrose 5%. 1000 milliLiter(s) (50 mL/Hr) IV Continuous <Continuous>  dextrose 50% Injectable 12.5 Gram(s) IV Push once  dextrose 50% Injectable 25 Gram(s) IV Push once  dextrose 50% Injectable 25 Gram(s) IV Push once  enoxaparin Injectable 40 milliGRAM(s) SubCutaneous daily  insulin lispro (HumaLOG) corrective regimen sliding scale   SubCutaneous every 6 hours  predniSONE   Tablet 40 milliGRAM(s) Oral every 24 hours    MEDICATIONS  (PRN):  dextrose 40% Gel 15 Gram(s) Oral once PRN Blood Glucose LESS THAN 70 milliGRAM(s)/deciliter  glucagon  Injectable 1 milliGRAM(s) IntraMuscular once PRN Glucose LESS THAN 70 milligrams/deciliter      Vital Signs Last 24 Hrs  T(C): 36.4 (23 Oct 2019 11:56), Max: 36.9 (22 Oct 2019 15:42)  T(F): 97.6 (23 Oct 2019 11:56), Max: 98.5 (22 Oct 2019 15:42)  HR: 99 (23 Oct 2019 14:00) (62 - 117)  BP: 129/90 (23 Oct 2019 14:00) (100/68 - 134/80)  BP(mean): 106 (23 Oct 2019 14:00) (78 - 106)  RR: 40 (23 Oct 2019 14:00) (12 - 40)  SpO2: 95% (23 Oct 2019 14:00) (89% - 100%)    LABS:                        10.4   9.61  )-----------( 234      ( 23 Oct 2019 05:57 )             34.6     10-23    142  |  104  |  20  ----------------------------<  262<H>  5.4<H>   |  34<H>  |  1.30    Ca    9.6      23 Oct 2019 05:57  Phos  3.6     10-23  Mg     2.4     10-23    TPro  7.0  /  Alb  3.6  /  TBili  0.3  /  DBili  x   /  AST  23  /  ALT  36  /  AlkPhos  91  10-22    PT/INR - ( 23 Oct 2019 05:57 )   PT: 11.5 sec;   INR: 1.02 ratio         PTT - ( 23 Oct 2019 05:57 )  PTT:30.5 sec      ABG - ( 23 Oct 2019 06:02 )  pH, Arterial: 7.40  pH, Blood: x     /  pCO2: 52    /  pO2: 138   / HCO3: 30    / Base Excess: 6.8   /  SaO2: 99                  WBC:  WBC Count: 9.61 K/uL (10-23 @ 05:57)  WBC Count: 16.37 K/uL (10-22 @ 08:04)      MICROBIOLOGY:  RECENT CULTURES:        CARDIAC MARKERS ( 23 Oct 2019 05:57 )  <.015 ng/mL / x     / x     / x     / x      CARDIAC MARKERS ( 22 Oct 2019 22:42 )  <.015 ng/mL / x     / x     / x     / x      CARDIAC MARKERS ( 22 Oct 2019 15:10 )  .022 ng/mL / x     / x     / x     / x      CARDIAC MARKERS ( 22 Oct 2019 08:04 )  <.015 ng/mL / x     / 58 U/L / x     / 4.1 ng/mL        PT/INR - ( 23 Oct 2019 05:57 )   PT: 11.5 sec;   INR: 1.02 ratio         PTT - ( 23 Oct 2019 05:57 )  PTT:30.5 sec    Sodium:  Sodium, Serum: 142 mmol/L (10-23 @ 05:57)  Sodium, Serum: 141 mmol/L (10-22 @ 08:04)      1.30 mg/dL 10-23 @ 05:57  1.10 mg/dL 10-22 @ 08:04      Hemoglobin:  Hemoglobin: 10.4 g/dL (10-23 @ 05:57)  Hemoglobin: 12.3 g/dL (10-22 @ 08:04)      Platelets: Platelet Count - Automated: 234 K/uL (10-23 @ 05:57)  Platelet Count - Automated: 274 K/uL (10-22 @ 08:04)      LIVER FUNCTIONS - ( 22 Oct 2019 08:04 )  Alb: 3.6 g/dL / Pro: 7.0 g/dL / ALK PHOS: 91 U/L / ALT: 36 U/L / AST: 23 U/L / GGT: x                 RADIOLOGY & ADDITIONAL STUDIES: Pulmonary/Critical Care Followup    PAST MEDICAL & SURGICAL HISTORY:  PVD (peripheral vascular disease)  Hypertension  COPD (chronic obstructive pulmonary disease): on 2L at home and BiPAP at night; intubated   Osteomyelitis  Dyslipidemia  CAD (Coronary Artery Disease): s/p 3v CABG ; stents placed in [place]   Diabetes Mellitus, Type II  S/P primary angioplasty with coronary stent  Compound fracture: left leg  CABG (Coronary Artery Bypass Graft):       Allergies    No Known Allergies    Intolerances    shellfish (Nausea)      FAMILY HISTORY:  Family hx of lung cancer: brother,  age 82, used to smoke with pt  Family history of diabetes mellitus (Sibling)      SOCIAL HISTORY:      MEDICATIONS  (STANDING):  albuterol/ipratropium for Nebulization. 3 milliLiter(s) Nebulizer every 6 hours  aspirin  chewable 81 milliGRAM(s) Oral daily  atorvastatin 40 milliGRAM(s) Oral at bedtime  azithromycin   Tablet 500 milliGRAM(s) Oral every 24 hours  buDESOnide    Inhalation Suspension 0.5 milliGRAM(s) Inhalation two times a day  clopidogrel Tablet 75 milliGRAM(s) Oral daily  dextrose 5%. 1000 milliLiter(s) (50 mL/Hr) IV Continuous <Continuous>  dextrose 50% Injectable 12.5 Gram(s) IV Push once  dextrose 50% Injectable 25 Gram(s) IV Push once  dextrose 50% Injectable 25 Gram(s) IV Push once  enoxaparin Injectable 40 milliGRAM(s) SubCutaneous daily  insulin lispro (HumaLOG) corrective regimen sliding scale   SubCutaneous every 6 hours  predniSONE   Tablet 40 milliGRAM(s) Oral every 24 hours    MEDICATIONS  (PRN):  dextrose 40% Gel 15 Gram(s) Oral once PRN Blood Glucose LESS THAN 70 milliGRAM(s)/deciliter  glucagon  Injectable 1 milliGRAM(s) IntraMuscular once PRN Glucose LESS THAN 70 milligrams/deciliter      Vital Signs Last 24 Hrs  T(C): 36.4 (23 Oct 2019 11:56), Max: 36.9 (22 Oct 2019 15:42)  T(F): 97.6 (23 Oct 2019 11:56), Max: 98.5 (22 Oct 2019 15:42)  HR: 99 (23 Oct 2019 14:00) (62 - 117)  BP: 129/90 (23 Oct 2019 14:00) (100/68 - 134/80)  BP(mean): 106 (23 Oct 2019 14:00) (78 - 106)  RR: 40 (23 Oct 2019 14:00) (12 - 40)  SpO2: 95% (23 Oct 2019 14:00) (89% - 100%)    LABS:                        10.4   9.61  )-----------( 234      ( 23 Oct 2019 05:57 )             34.6     10-23    142  |  104  |  20  ----------------------------<  262<H>  5.4<H>   |  34<H>  |  1.30    Ca    9.6      23 Oct 2019 05:57  Phos  3.6     10-23  Mg     2.4     10-23    TPro  7.0  /  Alb  3.6  /  TBili  0.3  /  DBili  x   /  AST  23  /  ALT  36  /  AlkPhos  91  10-22    PT/INR - ( 23 Oct 2019 05:57 )   PT: 11.5 sec;   INR: 1.02 ratio         PTT - ( 23 Oct 2019 05:57 )  PTT:30.5 sec      ABG - ( 23 Oct 2019 06:02 )  pH, Arterial: 7.40  pH, Blood: x     /  pCO2: 52    /  pO2: 138   / HCO3: 30    / Base Excess: 6.8   /  SaO2: 99                  WBC:  WBC Count: 9.61 K/uL (10-23 @ 05:57)  WBC Count: 16.37 K/uL (10-22 @ 08:04)      MICROBIOLOGY:  RECENT CULTURES:        CARDIAC MARKERS ( 23 Oct 2019 05:57 )  <.015 ng/mL / x     / x     / x     / x      CARDIAC MARKERS ( 22 Oct 2019 22:42 )  <.015 ng/mL / x     / x     / x     / x      CARDIAC MARKERS ( 22 Oct 2019 15:10 )  .022 ng/mL / x     / x     / x     / x      CARDIAC MARKERS ( 22 Oct 2019 08:04 )  <.015 ng/mL / x     / 58 U/L / x     / 4.1 ng/mL        PT/INR - ( 23 Oct 2019 05:57 )   PT: 11.5 sec;   INR: 1.02 ratio         PTT - ( 23 Oct 2019 05:57 )  PTT:30.5 sec    Sodium:  Sodium, Serum: 142 mmol/L (10-23 @ 05:57)  Sodium, Serum: 141 mmol/L (10-22 @ 08:04)      1.30 mg/dL 10-23 @ 05:57  1.10 mg/dL 10-22 @ 08:04      Hemoglobin:  Hemoglobin: 10.4 g/dL (10-23 @ 05:57)  Hemoglobin: 12.3 g/dL (10-22 @ 08:04)      Platelets: Platelet Count - Automated: 234 K/uL (10-23 @ 05:57)  Platelet Count - Automated: 274 K/uL (10-22 @ 08:04)      LIVER FUNCTIONS - ( 22 Oct 2019 08:04 )  Alb: 3.6 g/dL / Pro: 7.0 g/dL / ALK PHOS: 91 U/L / ALT: 36 U/L / AST: 23 U/L / GGT: x                 RADIOLOGY & ADDITIONAL STUDIES:  PATIENT COMMODORE TURNER  1940 DOA 10/22/2019 [facility] P [phone]   VITALS/LABS       10/23/2019 89 420/60 100%   10/23/2019 5a bpap /.3   10/23/2019 W 9.6 Hb 10.4 Plt 234 INR 1 Na 142 K 5.3 CO2 34 Cr 1.3     REVIEW OF SYMPTOMS     NOTE Noteworthy changes  if any  in ROS and PE are also entered  in note  below      Able to give ROS  Yes     RELIABLE No   CONSTITUTIONAL Weakness Yes  Chills No Vision changes No  ENDOCRINE No unexplained hair loss No heat or cold intolerance    ALLERGY No hives  Sore throat No   RESP Coughing blood no  Shortness of breath YES   NEURO No Headache  Confusion Pain neck No   CARDIAC No Chest pain No Palpitations   GI No Pain abdomen NO   Vomiting NO     PHYSICAL EXAM    HEENT Unremarkable PERRLA atraumatic   RESP Fair air entry EXP prolonged    Harsh breath sound Resp distres mild   CARDIAC S1 S2 No S3     NO JVD    ABDOMEN SOFT BS PRESENT NOT DISTENDED No hepatosplenomegaly PEDAL EDEMA present No calf tenderness  NO rash   GENERAL Not TOXIC looking    PATIENT COMMODALAN TURNER  1940 DOA 10/22/2019 [facility] P [phone]                           PATIENT DATA   ALLERGY shellfish  ADVANCED DIRECTIVE       WT  112             BMI                                                                                                                                            HEAD OF BED ELEVATION Yes  EKG     10/17 ekg s tachy               CXR      10/22 cxr napd              DVT PROPHYLAXIS    lvnx 40 (10/22)      STRESS ULCER PROPHYLAXIS  INFECTION PPLX                                                                                 DIET cons carb (10/23)   DYSPHAGIA EVAL IF APPLICABLE                                                                    GAS EXCHANGE   10/23/2019 6a bpap 24/8/.3 740/52/138   10/22/2019 1p 24/.3 726/68/101  10/22/2019 10a 188/.4 720/84/142   10/22/2019 8a nrb 722/79/184                                                                                    HEMODYNAMICS  DRIPS                                                                                          IV FLUIDS  INTAKE OUTPUT                                                     MICROBIO      10/22/2019 pc n   10/22/2019 flu ab n rsv n   ANTIBIOTICS        azithro (10/23    PATIENT COMMODORE YUE  1940 DOA 10/22/2019 [facility] P [phone]   EVENTS / CHANGES IN ROS & PE     10/23/2019 K 5.4    10/23/2019 Started on azithr  10/23/2019 Changed to pred 40 (10/23)      PATIENT COMMODORE YUE  1940 DOA 10/22/2019 [facility] P [phone]                           Pt description                            CC    palpitations r side face weak     er vitals     203/89 140 28 afeb                   HPI              79 m former smoker PMH HTN, DM2 (on home Metformin and glipizide), COPD (2L home/ BIPAP at night), CAD with 3v CABG , Stent 2019 HLD, 10/26/2018 ECHO ef 55% hx hypercapnic resp failure with ho intubation c/b with cardiac arrest (2018) with ho recurrent admissions GOLD D recent admission  2019 and 10/2019 with copd ex ac on chr hypercapnic resp failure  was  admitted with SVT Rxed with adenosine ac hypercapnic resp failure     home meds

## 2019-10-23 NOTE — PROGRESS NOTE ADULT - SUBJECTIVE AND OBJECTIVE BOX
Patient is a 79y old  Male who presents with a chief complaint of lethargy, worsening shortness of breath and work of breathing (22 Oct 2019 11:35)      BRIEF HOSPITAL COURSE: 80 y/o F with a h/o HTN, COPD on 2L home O2 and nocturnal BiPAP, HLD, CAD s/p CABG x3 in  and recent stent , remote hx of osteomyelitis in femur, prior cardiac arrest 2018 in setting of acute hypoxic/hypercarbic respiratory failure secondary to asthma exacerbation, recent hospitalization here in [facility] ICU for AECOPD and Afib with RVR- discharged 4 days ago, admitted on 10/22 with SVT and acute respiratory distress. SVT broken with adenosine in ED. ABG revealed respiratory acidosis secondary to hypercapnia (7.22/79/184/26), subsequently placed on BiPAP and transferred to ICU. No acute lung infiltrates or evidence of PE on CTA chest.     Events last 24 hours: Patient remains requiring BiPAP. Some improvement in respiratory acidosis/hypercapnia on repeat blood gas. Persistently hyperglycemic (B+).      PAST MEDICAL & SURGICAL HISTORY:  PVD (peripheral vascular disease)  Hypertension  COPD (chronic obstructive pulmonary disease): on 2L at home and BiPAP at night; intubated   Osteomyelitis  Dyslipidemia  CAD (Coronary Artery Disease): s/p 3v CABG ; stents placed in [place] in 2019  Diabetes Mellitus, Type II  S/P primary angioplasty with coronary stent  Compound fracture: left leg  CABG (Coronary Artery Bypass Graft):       Review of Systems:  Unable to obtain at this time secondary to tenuous resp status and NIPPV    Medications:  azithromycin  IVPB 500 milliGRAM(s) IV Intermittent every 24 hours  azithromycin  IVPB      albuterol/ipratropium for Nebulization. 3 milliLiter(s) Nebulizer every 4 hours  buDESOnide    Inhalation Suspension 0.5 milliGRAM(s) Inhalation two times a day  aspirin  chewable 81 milliGRAM(s) Oral daily  clopidogrel Tablet 75 milliGRAM(s) Oral daily  enoxaparin Injectable 40 milliGRAM(s) SubCutaneous daily  atorvastatin 40 milliGRAM(s) Oral at bedtime  dextrose 40% Gel 15 Gram(s) Oral once PRN  dextrose 50% Injectable 12.5 Gram(s) IV Push once  dextrose 50% Injectable 25 Gram(s) IV Push once  dextrose 50% Injectable 25 Gram(s) IV Push once  glucagon  Injectable 1 milliGRAM(s) IntraMuscular once PRN  insulin lispro (HumaLOG) corrective regimen sliding scale   SubCutaneous every 6 hours  methylPREDNISolone sodium succinate Injectable 40 milliGRAM(s) IV Push every 8 hours  dextrose 5%. 1000 milliLiter(s) IV Continuous <Continuous>  chlorhexidine 4% Liquid 1 Application(s) Topical <User Schedule>        ICU Vital Signs Last 24 Hrs  T(C): 36.4 (22 Oct 2019 23:39), Max: 36.9 (22 Oct 2019 15:42)  T(F): 97.6 (22 Oct 2019 23:39), Max: 98.5 (22 Oct 2019 15:42)  HR: 66 (23 Oct 2019 03:24) (66 - 144)  BP: 112/69 (23 Oct 2019 01:00) (100/68 - 203/89)  BP(mean): 86 (23 Oct 2019 01:00) (79 - 92)  ABP: --  ABP(mean): --  RR: 22 (23 Oct 2019 01:00) (9 - 28)  SpO2: 100% (23 Oct 2019 03:24) (99% - 100%)      ABG - ( 22 Oct 2019 13:49 )  pH, Arterial: 7.26  pH, Blood: x     /  pCO2: 68    /  pO2: 101   / HCO3: 26    / Base Excess: 3.0   /  SaO2: 97                  I&O's Detail    22 Oct 2019 07:01  -  23 Oct 2019 03:29  --------------------------------------------------------  IN:    Solution: 250 mL  Total IN: 250 mL    OUT:    Voided: 1100 mL  Total OUT: 1100 mL    Total NET: -850 mL            LABS:                        12.3   16.37 )-----------( 274      ( 22 Oct 2019 08:04 )             40.7     10    141  |  104  |  17  ----------------------------<  285<H>  4.4   |  32<H>  |  1.10    Ca    9.5      22 Oct 2019 08:04  Phos  4.5     10-  Mg     1.8     10    TPro  7.0  /  Alb  3.6  /  TBili  0.3  /  DBili  x   /  AST  23  /  ALT  36  /  AlkPhos  91  10-22      CARDIAC MARKERS ( 22 Oct 2019 22:42 )  <.015 ng/mL / x     / x     / x     / x      CARDIAC MARKERS ( 22 Oct 2019 15:10 )  .022 ng/mL / x     / x     / x     / x      CARDIAC MARKERS ( 22 Oct 2019 08:04 )  <.015 ng/mL / x     / 58 U/L / x     / 4.1 ng/mL      CAPILLARY BLOOD GLUCOSE  274 (22 Oct 2019 08:25)      POCT Blood Glucose.: 303 mg/dL (22 Oct 2019 23:43)    PT/INR - ( 22 Oct 2019 08:04 )   PT: 10.1 sec;   INR: 0.89 ratio         PTT - ( 22 Oct 2019 08:04 )  PTT:29.6 sec    CULTURES:  Rapid RVP Result: NotDetec (10-22-19 @ 19:15)  Rapid RVP Result: NotDetec (10-16-19 @ 16:28)  Culture Results:   No growth at 5 days. (10-16-19 @ 09:02)  Culture Results:   No growth at 5 days. (10-16-19 @ 09:02)        Physical Examination:    General: mild-mod resp distress.  Alert, interactive, on BiPAP    HEENT: Pupils equal, reactive to light.  Symmetric.    PULM: diffusely diminished bilaterally, no significant sputum production    CVS: Regular rate and rhythm, no murmurs, rubs, or gallops    ABD: Soft, nondistended, nontender, normoactive bowel sounds, no masses    EXT: No edema, nontender    SKIN: Warm and well perfused, no rashes noted.    NEURO: A&Ox3, strength 5/5 all extremities, cranial nerves grossly intact, no focal deficits      RADIOLOGY: ***        CRITICAL CARE TIME SPENT: ***  Time spent evaluating/treating patient with medical issues that pose a high risk for life threatening deterioration and/or end-organ damage, reviewing data/labs/imaging, discussing case with multidisciplinary team, discussing plan/goals of care with patient/family. Non-inclusive of procedure time. Patient is a 79y old  Male who presents with a chief complaint of lethargy, worsening shortness of breath and work of breathing (22 Oct 2019 11:35)      BRIEF HOSPITAL COURSE: 80 y/o F with a h/o HTN, COPD on 2L home O2 and nocturnal BiPAP, HLD, CAD s/p CABG x3 in  and recent stent , remote hx of osteomyelitis in femur, prior cardiac arrest 2018 in setting of acute hypoxic/hypercarbic respiratory failure secondary to asthma exacerbation, recent hospitalization here in [facility] ICU for AECOPD and Afib with RVR- discharged 4 days ago, admitted on 10/22 with SVT and acute respiratory distress. SVT broken with adenosine in ED. ABG revealed respiratory acidosis secondary to hypercapnia (7.22/79/184/26), subsequently placed on BiPAP and transferred to ICU. No acute lung infiltrates or evidence of PE on CTA chest.     Events last 24 hours: Patient remains requiring BiPAP. Some improvement in respiratory acidosis/hypercapnia on repeat blood gas. Persistently hyperglycemic (B+).      PAST MEDICAL & SURGICAL HISTORY:  PVD (peripheral vascular disease)  Hypertension  COPD (chronic obstructive pulmonary disease): on 2L at home and BiPAP at night; intubated   Osteomyelitis  Dyslipidemia  CAD (Coronary Artery Disease): s/p 3v CABG ; stents placed in [place] in 2019  Diabetes Mellitus, Type II  S/P primary angioplasty with coronary stent  Compound fracture: left leg  CABG (Coronary Artery Bypass Graft):       Review of Systems:  Unable to obtain at this time secondary to tenuous resp status and NIPPV    Medications:  azithromycin  IVPB 500 milliGRAM(s) IV Intermittent every 24 hours  azithromycin  IVPB      albuterol/ipratropium for Nebulization. 3 milliLiter(s) Nebulizer every 4 hours  buDESOnide    Inhalation Suspension 0.5 milliGRAM(s) Inhalation two times a day  aspirin  chewable 81 milliGRAM(s) Oral daily  clopidogrel Tablet 75 milliGRAM(s) Oral daily  enoxaparin Injectable 40 milliGRAM(s) SubCutaneous daily  atorvastatin 40 milliGRAM(s) Oral at bedtime  dextrose 40% Gel 15 Gram(s) Oral once PRN  dextrose 50% Injectable 12.5 Gram(s) IV Push once  dextrose 50% Injectable 25 Gram(s) IV Push once  dextrose 50% Injectable 25 Gram(s) IV Push once  glucagon  Injectable 1 milliGRAM(s) IntraMuscular once PRN  insulin lispro (HumaLOG) corrective regimen sliding scale   SubCutaneous every 6 hours  methylPREDNISolone sodium succinate Injectable 40 milliGRAM(s) IV Push every 8 hours  dextrose 5%. 1000 milliLiter(s) IV Continuous <Continuous>  chlorhexidine 4% Liquid 1 Application(s) Topical <User Schedule>        ICU Vital Signs Last 24 Hrs  T(C): 36.4 (22 Oct 2019 23:39), Max: 36.9 (22 Oct 2019 15:42)  T(F): 97.6 (22 Oct 2019 23:39), Max: 98.5 (22 Oct 2019 15:42)  HR: 66 (23 Oct 2019 03:24) (66 - 144)  BP: 112/69 (23 Oct 2019 01:00) (100/68 - 203/89)  BP(mean): 86 (23 Oct 2019 01:00) (79 - 92)  ABP: --  ABP(mean): --  RR: 22 (23 Oct 2019 01:00) (9 - 28)  SpO2: 100% (23 Oct 2019 03:24) (99% - 100%)      ABG - ( 22 Oct 2019 13:49 )  pH, Arterial: 7.26  pH, Blood: x     /  pCO2: 68    /  pO2: 101   / HCO3: 26    / Base Excess: 3.0   /  SaO2: 97                  I&O's Detail    22 Oct 2019 07:01  -  23 Oct 2019 03:29  --------------------------------------------------------  IN:    Solution: 250 mL  Total IN: 250 mL    OUT:    Voided: 1100 mL  Total OUT: 1100 mL    Total NET: -850 mL            LABS:                        12.3   16.37 )-----------( 274      ( 22 Oct 2019 08:04 )             40.7     10    141  |  104  |  17  ----------------------------<  285<H>  4.4   |  32<H>  |  1.10    Ca    9.5      22 Oct 2019 08:04  Phos  4.5     10-  Mg     1.8     10    TPro  7.0  /  Alb  3.6  /  TBili  0.3  /  DBili  x   /  AST  23  /  ALT  36  /  AlkPhos  91  10-22      CARDIAC MARKERS ( 22 Oct 2019 22:42 )  <.015 ng/mL / x     / x     / x     / x      CARDIAC MARKERS ( 22 Oct 2019 15:10 )  .022 ng/mL / x     / x     / x     / x      CARDIAC MARKERS ( 22 Oct 2019 08:04 )  <.015 ng/mL / x     / 58 U/L / x     / 4.1 ng/mL      CAPILLARY BLOOD GLUCOSE  274 (22 Oct 2019 08:25)      POCT Blood Glucose.: 303 mg/dL (22 Oct 2019 23:43)    PT/INR - ( 22 Oct 2019 08:04 )   PT: 10.1 sec;   INR: 0.89 ratio         PTT - ( 22 Oct 2019 08:04 )  PTT:29.6 sec    CULTURES:  Rapid RVP Result: NotDetec (10-22-19 @ 19:15)  Rapid RVP Result: NotDetec (10-16-19 @ 16:28)  Culture Results:   No growth at 5 days. (10-16-19 @ 09:02)  Culture Results:   No growth at 5 days. (10-16-19 @ 09:02)        Physical Examination:    General: mild-mod resp distress.  Alert, interactive, on BiPAP    HEENT: Pupils equal, reactive to light.  Symmetric.    PULM: diffusely diminished bilaterally, poor air movement, no significant sputum production    CVS: Regular rate and rhythm, no murmurs, rubs, or gallops    ABD: Soft, nondistended, nontender, normoactive bowel sounds, no masses    EXT: No edema, nontender    SKIN: Warm and well perfused, no rashes noted.    NEURO: A&Ox3, strength 5/5 all extremities, cranial nerves grossly intact, no focal deficits      RADIOLOGY:     < from: CT Angio Chest w/ IV Cont (10.22.19 @ 11:44) >  FINDINGS:    LUNGS AND AIRWAYS: Patent central airways.  There is mild diffuse changes   of centrilobular emphysema. There are scattered areas of linear scarring   in both lung bases, lingula and right middle lobe. There is a stable   punctate 1 mm calcified nodule anteriorly in the right upper lobe on   image #44/series 2. There are areas of mucus impaction of the distal   airways to the lower lobes.    PLEURA: No pleural effusion.    MEDIASTINUM AND FRANKLYN: No lymphadenopathy.    VESSELS: There is no pulmonary embolism. The thoracic aorta and main   pulmonary artery are normal in caliber. There is coronary artery   calcification. Patient is status post CABG.    HEART: Heart size is normal. No pericardial effusion.    CHEST WALL AND LOWER NECK: The thyroid gland is within normal limits.   There is no supraclavicular or axillary lymphadenopathy.    VISUALIZED UPPER ABDOMEN: The adrenal glands are within normal limits.   The imaged portions of the liver, spleen, pancreas and kidneys are   unremarkable. There is a small gallstone within the imaged gallbladder.    BONES: Multilevel degenerative change of the thoracic spine with   scattered osteophytes and degenerative disc disease. Median sternotomy   wires.    IMPRESSION:     No pulmonary embolism.    Stable changes of emphysema.    Stable areas of mucosal impaction in the distal small airways to the   lower lobes.            CRITICAL CARE TIME SPENT: 35 mins  Time spent evaluating/treating patient with medical issues that pose a high risk for life threatening deterioration and/or end-organ damage, reviewing data/labs/imaging, discussing case with multidisciplinary team, discussing plan/goals of care with patient/family. Non-inclusive of procedure time.

## 2019-10-23 NOTE — PROGRESS NOTE ADULT - ASSESSMENT
80 yo Male with PMHx of COPD on home Bipap, CAD, CABG in 2004, recent stent in 8/2019, HTN, DM II A1C 7.2, admit with -200s and acute exacerbation of COPD vs status asthmaticus with respiratory acidosis, on Bipap.     Plan:    Neuro: No active issues.    CV: HD Stable, No episodes of SVT. Hold BB for now, unless added support needed for Tachyarrhythmia. Continue with ASA and Clopidogrel for CAD.     Pulm: Respiratory acidosis resolving with compensation. Continue with use of nocturnal Bipap. (Home setting was 18/8 with 2L nasal Cannula). Decrease Albuterol and Ipratropium frequency to Q6hrs, continue with Budesonide. Decrease Methyl-Prednisone to Prednisone PO 40mg qDaily.     GI: Start Consistent Carb Diet    Renal: Stable kidney functions. K 5.4 - will repeat and follow up BMP at 2pm.     ID: Leukocytosis - 16.37 yesterday, normalized today 9.6, Afebrile. C/w Azithromycin but change to PO dose BID for 3 days.     Endo: Hyperglycemic >250 on Insulin Sliding scale. Hold home meds of Metformin and Glipizide. Pt also takes Humalog at home with sliding scale, as per wife - 4-6 units on average pre-meals.     Heme: Lovenox for DVT prophylaxis    Skin: No lines, No Gonzáles    Dispo: Full Code, transfer to tele. 80 yo Male with PMHx of COPD on home Bipap, CAD, CABG in 2004, recent stent in 8/2019, HTN, DM II A1C 7.2, admit with -200s and acute exacerbation of COPD vs status asthmaticus with respiratory acidosis, on Bipap.     Plan:    Neuro: No active issues.    CV: HD Stable, No episodes of SVT. Hold BB for now, unless added support needed for Tachyarrhythmia. Previous admission for Afib with RVR accompanied with COPD exacerbation. Recurrent RVR secondary to these exacerbated episodes. Continue with ASA and Clopidogrel for CAD.     Pulm: Respiratory acidosis resolving with compensation. Continue with use of nocturnal Bipap. (Home setting was 18/8 with 2L nasal Cannula). Decrease Albuterol and Ipratropium frequency to Q6hrs, continue with Budesonide. Decrease Methyl-Prednisone to Prednisone PO 40mg qDaily. Pulmonary Consult. Need to further workup cause for frequent COPD exacerbations vs Status Asthmaticus? and recurrent hospitalizations. Pt is on LAMA and LABA with corticosteroids.     GI: Start Consistent Carb Diet    Renal: Stable kidney functions. K 5.4 - will repeat and follow up BMP at 2pm.     ID: Leukocytosis - 16.37 yesterday, normalized today 9.6, Afebrile. C/w Azithromycin but change to PO dose BID for 3 days.     Endo: Hyperglycemic >250 on Insulin Sliding scale. Hold home meds of Metformin and Glipizide. Pt also takes Humalog at home with sliding scale, as per wife - 4-6 units on average pre-meals.     Heme: Lovenox for DVT prophylaxis    Skin: No lines, No Gonzáles    Dispo: Full Code, transfer to tele. 80 yo Male with PMHx of COPD on home Bipap, CAD, CABG in 2004, recent stent in 8/2019, HTN, DM II A1C 7.2, admit with -200s and acute exacerbation of COPD vs status asthmaticus with respiratory acidosis, on Bipap.     Plan:    Neuro: No active issues.    CV: HD Stable, No episodes of SVT. Hold BB for now, unless added support needed for Tachyarrhythmia. Previous admission for Afib with RVR accompanied with COPD exacerbation. Recurrent RVR secondary to these exacerbated episodes. Continue with ASA and Clopidogrel for CAD.     Pulm: Respiratory acidosis resolving with compensation. Continue with use of nocturnal Bipap on 24/8 (Home setting was 18/8 with 2L nasal Cannula). Decrease Albuterol and Ipratropium frequency to Q6hrs, continue with Budesonide. Decrease Methyl-Prednisone to Prednisone PO 40mg qDaily. Pulmonary Consult. Need to further workup cause for frequent COPD exacerbations vs Status Asthmaticus? and recurrent hospitalizations. Pt is on LAMA and LABA with corticosteroids.     GI: Start Consistent Carb Diet    Renal: Stable kidney functions. K 5.4 - will repeat and follow up BMP at 2pm.     ID: Leukocytosis - 16.37 yesterday, normalized today 9.6, Afebrile. C/w Azithromycin but change to PO dose BID for 3 days.     Endo: Hyperglycemic >250 on Insulin Sliding scale. Hold home meds of Metformin and Glipizide. Pt also takes Humalog at home with sliding scale, as per wife - 4-6 units on average pre-meals.     Heme: Lovenox for DVT prophylaxis    Skin: No lines, No Gonzáles    Dispo: Full Code, transfer to tele. 78 yo Male with PMHx of COPD on home Bipap, CAD, CABG in 2004, recent stent in 8/2019, HTN, DM II A1C 7.2, admit with -200s and acute exacerbation of COPD vs status asthmaticus with respiratory acidosis, on Bipap.     Plan:    Neuro: No active issues.    CV: HD Stable, No episodes of SVT. Hold BB for now, unless added support needed for Tachyarrhythmia. Previous admission for Afib with RVR accompanied with COPD exacerbation. Recurrent RVR secondary to these exacerbated episodes. Continue with ASA and Clopidogrel for CAD.     Pulm: Respiratory acidosis resolving with compensation. Continue with use of nocturnal Bipap on 24/8 (Home setting was 18/8 with 2L nasal Cannula). Decrease Albuterol and Ipratropium frequency to Q6hrs, continue with Budesonide. Decrease Methyl-Prednisone to Prednisone PO. Pulmonary Consult. Need to further workup cause for frequent COPD exacerbations vs Status Asthmaticus? and recurrent hospitalizations. Pt is on LAMA and LABA with corticosteroids. RVP, RSV, Flu A and Flu B - NOT detected     GI: Start Consistent Carb Diet    Renal: Stable kidney functions. K 5.4 - will repeat and follow up BMP at 2pm.     ID: Leukocytosis - 16.37 yesterday, normalized today 9.6, Afebrile. C/w Azithromycin but change to PO dose BID for 3 days.     Endo: Hyperglycemic >250 on Insulin Sliding scale. Hold home meds of Metformin and Glipizide. Pt also takes Humalog at home with sliding scale, as per wife - 4-6 units on average pre-meals.     Heme: Lovenox for DVT prophylaxis    Skin: No lines, No Gonzáles    Dispo: Full Code, transfer to tele.

## 2019-10-23 NOTE — PROGRESS NOTE ADULT - PROBLEM SELECTOR PLAN 4
ACS ruled out  Trops negative
BP stable at this time. Restart outpatient antihypertensive regimen when tolerating oral intake.

## 2019-10-23 NOTE — PROGRESS NOTE ADULT - SUBJECTIVE AND OBJECTIVE BOX
Patient is a 79y old  Male who presents with a chief complaint of Fast heart beat, lethargy, worsening shortness of breath and work of breathing (23 Oct 2019 07:49)       INTERVAL HPI/OVERNIGHT EVENTS:80yo male with pmhx of COPD (on home O2 2L, and bipap at night and prn), CAD w/ stents (most recent 8/2019), CABG x3, HLD, DM2 (not insulin dependent), hx of hypercapnic resp failure and cardiac arrest requiring intubation (2018), osteomyelitis, presented to ED with worsening sob and wob a/w acute on chronic respiratory failure with hypercarbia req continuous bipap sec to acute copd exacerbation, r/o PE and svt now sinus tachycardia. Seen and examined at bedside. Awake, Alert. SOb has been improving. On 2L via NC at home. Off BIPAP, on 3L via NC. Denies chest pain, palpitation.       MEDICATIONS  (STANDING):  albuterol/ipratropium for Nebulization. 3 milliLiter(s) Nebulizer every 4 hours  aspirin  chewable 81 milliGRAM(s) Oral daily  atorvastatin 40 milliGRAM(s) Oral at bedtime  azithromycin  IVPB 500 milliGRAM(s) IV Intermittent every 24 hours  azithromycin  IVPB      buDESOnide    Inhalation Suspension 0.5 milliGRAM(s) Inhalation two times a day  clopidogrel Tablet 75 milliGRAM(s) Oral daily  dextrose 5%. 1000 milliLiter(s) (50 mL/Hr) IV Continuous <Continuous>  dextrose 50% Injectable 12.5 Gram(s) IV Push once  dextrose 50% Injectable 25 Gram(s) IV Push once  dextrose 50% Injectable 25 Gram(s) IV Push once  enoxaparin Injectable 40 milliGRAM(s) SubCutaneous daily  insulin lispro (HumaLOG) corrective regimen sliding scale   SubCutaneous every 6 hours  methylPREDNISolone sodium succinate Injectable 40 milliGRAM(s) IV Push every 8 hours    MEDICATIONS  (PRN):  dextrose 40% Gel 15 Gram(s) Oral once PRN Blood Glucose LESS THAN 70 milliGRAM(s)/deciliter  glucagon  Injectable 1 milliGRAM(s) IntraMuscular once PRN Glucose LESS THAN 70 milligrams/deciliter      Allergies    No Known Allergies    Intolerances    shellfish (Nausea)      REVIEW OF SYSTEMS:  CONSTITUTIONAL: No fever, or fatigue  EYES: No eye pain, visual disturbances, or discharge  ENMT:  No difficulty hearing, tinnitus, vertigo; No sinus or throat pain  NECK: No pain or stiffness  RESPIRATORY: No cough, wheezing, chills or hemoptysis; + shortness of breath  CARDIOVASCULAR: No chest pain, palpitations  GASTROINTESTINAL: No abdominal or epigastric pain. No nausea, vomiting, or hematemesis; No diarrhea or constipation.  GENITOURINARY: No dysuria, frequency, hematuria, or incontinence  NEUROLOGICAL: No headaches, memory loss, loss of strength, numbness, or tremors  SKIN: No itching, burning, rashes, or lesions   LYMPH NODES: No enlarged glands  ENDOCRINE: No heat or cold intolerance; No hair loss; No polydipsia or polyuria  MUSCULOSKELETAL: No joint pain or swelling; No muscle, back, or extremity pain  HEME/LYMPH: No easy bruising, or bleeding gums    Vital Signs Last 24 Hrs  T(C): 36.8 (23 Oct 2019 07:36), Max: 36.9 (22 Oct 2019 15:42)  T(F): 98.2 (23 Oct 2019 07:36), Max: 98.5 (22 Oct 2019 15:42)  HR: 83 (23 Oct 2019 09:00) (62 - 125)  BP: 116/61 (23 Oct 2019 09:00) (100/68 - 134/80)  BP(mean): 82 (23 Oct 2019 09:00) (79 - 98)  RR: 26 (23 Oct 2019 09:00) (9 - 28)  SpO2: 100% (23 Oct 2019 09:00) (99% - 100%)    PHYSICAL EXAM:  GENERAL: NAD, well-developed  HEAD:  Atraumatic, Normocephalic  EYES: EOMI, PERRLA, conjunctiva and sclera clear  ENMT: No tonsillar erythema, exudates, or enlargement; Moist mucous membranes  NECK: Supple, No JVD, Normal thyroid  NERVOUS SYSTEM:  Alert & Awake, Motor Strength 5/5 B/L upper and lower extremities  CHEST/LUNG: Decreased  to auscultation bilaterally; No rales, rhonchi, wheezing, or rubs  HEART: S1S2+, Regular rate and rhythm  ABDOMEN: Soft, Nontender, Nondistended; Bowel sounds present  EXTREMITIES:  2+ Peripheral Pulses, No clubbing, cyanosis  LYMPH: No lymphadenopathy noted  SKIN: Warm, dry     LABS:                        10.4   9.61  )-----------( 234      ( 23 Oct 2019 05:57 )             34.6     23 Oct 2019 05:57    142    |  104    |  20     ----------------------------<  262    5.4     |  34     |  1.30     Ca    9.6        23 Oct 2019 05:57  Phos  3.6       23 Oct 2019 05:57  Mg     2.4       23 Oct 2019 05:57      PT/INR - ( 23 Oct 2019 05:57 )   PT: 11.5 sec;   INR: 1.02 ratio         PTT - ( 23 Oct 2019 05:57 )  PTT:30.5 sec  CAPILLARY BLOOD GLUCOSE      POCT Blood Glucose.: 255 mg/dL (23 Oct 2019 05:42)  POCT Blood Glucose.: 303 mg/dL (22 Oct 2019 23:43)  POCT Blood Glucose.: 276 mg/dL (22 Oct 2019 17:54)  POCT Blood Glucose.: 264 mg/dL (22 Oct 2019 13:16)    BLOOD CULTURE    RADIOLOGY & ADDITIONAL TESTS:    Imaging Personally Reviewed:  [ ] YES     Consultant(s) Notes Reviewed:      Care Discussed with Consultants/Other Providers:

## 2019-10-24 ENCOUNTER — TRANSCRIPTION ENCOUNTER (OUTPATIENT)
Age: 79
End: 2019-10-24

## 2019-10-24 VITALS
OXYGEN SATURATION: 96 % | HEART RATE: 87 BPM | DIASTOLIC BLOOD PRESSURE: 72 MMHG | SYSTOLIC BLOOD PRESSURE: 120 MMHG | RESPIRATION RATE: 18 BRPM | TEMPERATURE: 98 F

## 2019-10-24 LAB
ANION GAP SERPL CALC-SCNC: 5 MMOL/L — SIGNIFICANT CHANGE UP (ref 5–17)
BASOPHILS # BLD AUTO: 0.01 K/UL — SIGNIFICANT CHANGE UP (ref 0–0.2)
BASOPHILS NFR BLD AUTO: 0.1 % — SIGNIFICANT CHANGE UP (ref 0–2)
BUN SERPL-MCNC: 27 MG/DL — HIGH (ref 7–23)
CALCIUM SERPL-MCNC: 9.5 MG/DL — SIGNIFICANT CHANGE UP (ref 8.5–10.1)
CHLORIDE SERPL-SCNC: 102 MMOL/L — SIGNIFICANT CHANGE UP (ref 96–108)
CO2 SERPL-SCNC: 34 MMOL/L — HIGH (ref 22–31)
CREAT SERPL-MCNC: 1.3 MG/DL — SIGNIFICANT CHANGE UP (ref 0.5–1.3)
EOSINOPHIL # BLD AUTO: 0.04 K/UL — SIGNIFICANT CHANGE UP (ref 0–0.5)
EOSINOPHIL NFR BLD AUTO: 0.3 % — SIGNIFICANT CHANGE UP (ref 0–6)
GLUCOSE SERPL-MCNC: 277 MG/DL — HIGH (ref 70–99)
HCT VFR BLD CALC: 33.1 % — LOW (ref 39–50)
HGB BLD-MCNC: 10.2 G/DL — LOW (ref 13–17)
IMM GRANULOCYTES NFR BLD AUTO: 0.6 % — SIGNIFICANT CHANGE UP (ref 0–1.5)
LYMPHOCYTES # BLD AUTO: 1.3 K/UL — SIGNIFICANT CHANGE UP (ref 1–3.3)
LYMPHOCYTES # BLD AUTO: 10.5 % — LOW (ref 13–44)
MAGNESIUM SERPL-MCNC: 2.1 MG/DL — SIGNIFICANT CHANGE UP (ref 1.6–2.6)
MCHC RBC-ENTMCNC: 27.3 PG — SIGNIFICANT CHANGE UP (ref 27–34)
MCHC RBC-ENTMCNC: 30.8 GM/DL — LOW (ref 32–36)
MCV RBC AUTO: 88.5 FL — SIGNIFICANT CHANGE UP (ref 80–100)
MONOCYTES # BLD AUTO: 1.11 K/UL — HIGH (ref 0–0.9)
MONOCYTES NFR BLD AUTO: 9 % — SIGNIFICANT CHANGE UP (ref 2–14)
NEUTROPHILS # BLD AUTO: 9.85 K/UL — HIGH (ref 1.8–7.4)
NEUTROPHILS NFR BLD AUTO: 79.5 % — HIGH (ref 43–77)
NRBC # BLD: 0 /100 WBCS — SIGNIFICANT CHANGE UP (ref 0–0)
PHOSPHATE SERPL-MCNC: 3.1 MG/DL — SIGNIFICANT CHANGE UP (ref 2.5–4.5)
PLATELET # BLD AUTO: 243 K/UL — SIGNIFICANT CHANGE UP (ref 150–400)
POTASSIUM SERPL-MCNC: 4.2 MMOL/L — SIGNIFICANT CHANGE UP (ref 3.5–5.3)
POTASSIUM SERPL-SCNC: 4.2 MMOL/L — SIGNIFICANT CHANGE UP (ref 3.5–5.3)
RBC # BLD: 3.74 M/UL — LOW (ref 4.2–5.8)
RBC # FLD: 13.4 % — SIGNIFICANT CHANGE UP (ref 10.3–14.5)
SODIUM SERPL-SCNC: 141 MMOL/L — SIGNIFICANT CHANGE UP (ref 135–145)
WBC # BLD: 12.39 K/UL — HIGH (ref 3.8–10.5)
WBC # FLD AUTO: 12.39 K/UL — HIGH (ref 3.8–10.5)

## 2019-10-24 PROCEDURE — 99239 HOSP IP/OBS DSCHRG MGMT >30: CPT

## 2019-10-24 PROCEDURE — 99232 SBSQ HOSP IP/OBS MODERATE 35: CPT

## 2019-10-24 RX ORDER — AZITHROMYCIN 500 MG/1
1 TABLET, FILM COATED ORAL
Qty: 0 | Refills: 0 | DISCHARGE
Start: 2019-10-24

## 2019-10-24 RX ORDER — AZITHROMYCIN 500 MG/1
1 TABLET, FILM COATED ORAL
Qty: 2 | Refills: 0
Start: 2019-10-24 | End: 2019-10-25

## 2019-10-24 RX ORDER — VALSARTAN 80 MG/1
1 TABLET ORAL
Qty: 0 | Refills: 0 | DISCHARGE

## 2019-10-24 RX ADMIN — Medication 4: at 08:18

## 2019-10-24 RX ADMIN — Medication 25 MILLIGRAM(S): at 06:03

## 2019-10-24 RX ADMIN — Medication 0.5 MILLIGRAM(S): at 08:18

## 2019-10-24 RX ADMIN — ENOXAPARIN SODIUM 40 MILLIGRAM(S): 100 INJECTION SUBCUTANEOUS at 11:59

## 2019-10-24 RX ADMIN — Medication 81 MILLIGRAM(S): at 12:00

## 2019-10-24 RX ADMIN — Medication 3 MILLILITER(S): at 08:17

## 2019-10-24 RX ADMIN — Medication 2: at 12:44

## 2019-10-24 RX ADMIN — CLOPIDOGREL BISULFATE 75 MILLIGRAM(S): 75 TABLET, FILM COATED ORAL at 12:00

## 2019-10-24 RX ADMIN — Medication 3 MILLILITER(S): at 01:08

## 2019-10-24 RX ADMIN — Medication 40 MILLIGRAM(S): at 12:00

## 2019-10-24 NOTE — PROGRESS NOTE ADULT - ATTENDING COMMENTS
I saw and examined the patient personally. Spoke with above provider regarding this case. I reviewed the above findings completely.  I agree with the above history, physical, and plan which I have edited where appropriate.
78 yo M with Hx asthma and COPD, chronic hypoxemic and hypercapnic respiratory failure with frequent admission for COPD/asthma exacerbation, also CAD presenting with acute on chronic hypercapnic respiratory failure from COPD exacerbation.  Markedly improved today.    --mentating well  --CAD, continue ASA, plavix, statin  restart lopressor  --acute respiratory failure resolved  BiPAP overnight for chronic hypercapnic respiratory failure   would benefit from outpt sleep study  --COPD exacerbation, much improved  continue standing duonebs and inhaled steroids, transition solumedrol to prednisone  anticipate restarting controlled meds tomorrow  --advance diet  --CKD stable  mild hyperkalemia, repeat in pm  --DM2, continue insulin coverage scale  --discussed plan with pt and wife  --stable for tele
Plan as above

## 2019-10-24 NOTE — PROGRESS NOTE ADULT - ASSESSMENT
80 yo male with PMH of PVD, HTN, COPD (on BiPAP and 2L at home), dyslipidemia, CAD (s/p CABG x3), and DM presenting with SVT and shortness of breath, received Adenosine x2.  SVT now resolved but still sinus tachy, shortness of breath in the setting of hypercarbia, now improved with bipap    CAD/CABG hx  -no acute ischemia  -recent pci cont dapt  -cont statin    -no vol overload of significance  -mild edema, can observe for now    - Had SVT during prior admission in the setting of hypercarbia. He has been on metoprolol 25 mg po bid.   - would resume if possible so as to avoid recurrent arrhythmia  -Monitor on tele.    - Previous ECHO from 03/19 showing normal LV EF of 55%, with mild valvular calcifications. Patient's wife reports recent echo was done 1 month ago but unsure of the results. Plesae contact Dr. Rod to obtain results.    HTN  - Controlled  - CW BB  - monitor routine hemodynamics.   - BP elevated on arrival in the setting of acute respiratory distress, now improved.     HLD  - CW statin     VTE ppx  - cw enoxaparin     - monitor and replete lytes, keep K>4, Mg>2  - Other cardiovascular workup will depend on clinical course.  - All other workup per primary team  - Will follow with you  [facility]  Cardiology   Spectra #[phone] 78 yo male with PMH of PVD, HTN, COPD (on BiPAP and 2L at home), dyslipidemia, CAD (s/p CABG x3), and DM presenting with SVT and shortness of breath, received Adenosine x2.  SVT now resolved but still sinus tachy, shortness of breath in the setting of hypercarbia, now improved with bipap    CAD/CABG hx  -no acute ischemia  -recent pci cont dapt  -cont statin    -no vol overload of significance  -mild edema, can observe for now    - Had SVT during prior admission in the setting of hypercarbia.   - cont on metoprolol 25 mg po bid.   -Monitor on tele.    - Previous ECHO from 03/19 showing normal LV EF of 55%, with mild valvular calcifications. Patient's wife reports recent echo was done 1 month ago but unsure of the results. Please contact Dr. Rod to obtain results.    HTN  - Controlled  - CW BB  - monitor routine hemodynamics.   - BP elevated on arrival in the setting of acute respiratory distress, now improved.     HLD  - CW statin     VTE ppx  - cw enoxaparin     - monitor and replete lytes, keep K>4, Mg>2  - Other cardiovascular workup will depend on clinical course.  - All other workup per primary team  - Will follow with you  [facility]  Cardiology   Spectra #[phone]

## 2019-10-24 NOTE — DISCHARGE NOTE PROVIDER - NSDCCPCAREPLAN_GEN_ALL_CORE_FT
PRINCIPAL DISCHARGE DIAGNOSIS  Diagnosis: Acute respiratory failure with hypoxia  Assessment and Plan of Treatment: Continue home meds, O2  F/u with Dr. Dejesus.

## 2019-10-24 NOTE — PROGRESS NOTE ADULT - REASON FOR ADMISSION
Fast heart beat, lethargy, worsening shortness of breath and work of breathing
lethargy, worsening shortness of breath and work of breathing

## 2019-10-24 NOTE — PROGRESS NOTE ADULT - SUBJECTIVE AND OBJECTIVE BOX
[facility] -- Saud Aguilar, Génesis Louise, Carroll Haney Savella  Office # [phone]      Follow Up:    resp failure, svt    Subjective/Observations:   No events overnight resting comfortably in bed.  No complaints of chest pain, dyspnea, or palpitations reported. No signs of orthopnea or PND.     REVIEW OF SYSTEMS: All other review of systems is negative unless indicated above    PAST MEDICAL & SURGICAL HISTORY:  PVD (peripheral vascular disease)  Hypertension  COPD (chronic obstructive pulmonary disease): on 2L at home and BiPAP at night; intubated 6/18  Osteomyelitis  Dyslipidemia  CAD (Coronary Artery Disease): s/p 3v CABG 2004; stents placed in [place] in 2019  Diabetes Mellitus, Type II  S/P primary angioplasty with coronary stent  Compound fracture: left leg  CABG (Coronary Artery Bypass Graft): 2004      MEDICATIONS  (STANDING):  albuterol/ipratropium for Nebulization. 3 milliLiter(s) Nebulizer every 6 hours  aspirin  chewable 81 milliGRAM(s) Oral daily  atorvastatin 40 milliGRAM(s) Oral at bedtime  azithromycin   Tablet 500 milliGRAM(s) Oral every 24 hours  buDESOnide    Inhalation Suspension 0.5 milliGRAM(s) Inhalation two times a day  clopidogrel Tablet 75 milliGRAM(s) Oral daily  dextrose 5%. 1000 milliLiter(s) (50 mL/Hr) IV Continuous <Continuous>  dextrose 50% Injectable 12.5 Gram(s) IV Push once  dextrose 50% Injectable 25 Gram(s) IV Push once  dextrose 50% Injectable 25 Gram(s) IV Push once  enoxaparin Injectable 40 milliGRAM(s) SubCutaneous daily  insulin lispro (HumaLOG) corrective regimen sliding scale   SubCutaneous Before meals and at bedtime  metoprolol tartrate 25 milliGRAM(s) Oral two times a day  predniSONE   Tablet 40 milliGRAM(s) Oral every 24 hours    MEDICATIONS  (PRN):  dextrose 40% Gel 15 Gram(s) Oral once PRN Blood Glucose LESS THAN 70 milliGRAM(s)/deciliter  glucagon  Injectable 1 milliGRAM(s) IntraMuscular once PRN Glucose LESS THAN 70 milligrams/deciliter      Allergies    No Known Allergies    Intolerances    shellfish (Nausea)      Vital Signs Last 24 Hrs  T(C): 36.3 (24 Oct 2019 07:33), Max: 37 (23 Oct 2019 15:23)  T(F): 97.4 (24 Oct 2019 07:33), Max: 98.6 (23 Oct 2019 15:23)  HR: 82 (24 Oct 2019 08:17) (68 - 112)  BP: 115/62 (24 Oct 2019 07:33) (105/55 - 129/90)  BP(mean): 84 (23 Oct 2019 18:00) (73 - 106)  RR: 20 (24 Oct 2019 07:33) (19 - 38)  SpO2: 95% (24 Oct 2019 08:17) (89% - 100%)    I&O's Summary    23 Oct 2019 07:01  -  24 Oct 2019 07:00  --------------------------------------------------------  IN: 950 mL / OUT: 950 mL / NET: 0 mL    24 Oct 2019 07:01  -  24 Oct 2019 10:37  --------------------------------------------------------  IN: 0 mL / OUT: 150 mL / NET: -150 mL          PHYSICAL EXAM:  TELE:   Constitutional: NAD, awake and alert, well-developed  HEENT: Moist Mucous Membranes, Anicteric  Pulmonary: Non-labored, breath sounds with Bilateral expiratory wheezes throughout   No  crackles or rhonchi   Cardiovascular: Regular, S1 and S2 nl, No murmurs, rubs, gallops or clicks  Gastrointestinal: Bowel Sounds present, soft, nontender.   Lymph: No lymphadenopathy. No peripheral edema.  Skin: No visible rashes or ulcers.  Psych:  Mood & affect appropriate    LABS: All Labs Reviewed:                        10.2   12.39 )-----------( 243      ( 24 Oct 2019 06:55 )             33.1                         10.4   9.61  )-----------( 234      ( 23 Oct 2019 05:57 )             34.6                         12.3   16.37 )-----------( 274      ( 22 Oct 2019 08:04 )             40.7     24 Oct 2019 06:55    141    |  102    |  27     ----------------------------<  277    4.2     |  34     |  1.30   23 Oct 2019 14:56    139    |  99     |  23     ----------------------------<  339    4.8     |  32     |  1.40   23 Oct 2019 05:57    142    |  104    |  20     ----------------------------<  262    5.4     |  34     |  1.30     Ca    9.5        24 Oct 2019 06:55  Ca    9.4        23 Oct 2019 14:56  Ca    9.6        23 Oct 2019 05:57  Phos  3.1       24 Oct 2019 06:55  Phos  3.6       23 Oct 2019 05:57  Phos  4.5       22 Oct 2019 08:04  Mg     2.1       24 Oct 2019 06:55  Mg     2.4       23 Oct 2019 05:57  Mg     1.8       22 Oct 2019 08:04    TPro  7.0    /  Alb  3.6    /  TBili  0.3    /  DBili  x      /  AST  23     /  ALT  36     /  AlkPhos  91     22 Oct 2019 08:04    PT/INR - ( 23 Oct 2019 05:57 )   PT: 11.5 sec;   INR: 1.02 ratio         PTT - ( 23 Oct 2019 05:57 )  PTT:30.5 sec  CARDIAC MARKERS ( 23 Oct 2019 05:57 )  <.015 ng/mL / x     / x     / x     / x      CARDIAC MARKERS ( 22 Oct 2019 22:42 )  <.015 ng/mL / x     / x     / x     / x      CARDIAC MARKERS ( 22 Oct 2019 15:10 )  .022 ng/mL / x     / x     / x     / x             ECG:  < from: 12 Lead ECG (10.22.19 @ 07:57) >  Ventricular Rate 142 BPM    Atrial Rate 142 BPM    P-R Interval 132 ms    QRS Duration 78 ms    Q-T Interval 288 ms    QTC Calculation(Bezet) 443 ms    P Axis 77 degrees    R Axis 89 degrees    T Axis 24 degrees    Diagnosis Line Sinus tachycardia  Otherwise normal ECG    Confirmed by deep Vila ([id number]) on 10/22/2019 3:16:20 PM    < end of copied text >    Echo:  < from: TTE Echo Doppler w/o Cont (03.12.19 @ 20:42) >   EXAM:  ECHO TTE WO CON COMP W DOPPLR         PROCEDURE DATE:  03/12/2019        INTERPRETATION:  Ordering Physician: MARTÍN CARRASQUILLO [id number]    Indication: Shortness of breath    Study Quality: Technically difficult   A complete echocardiographic study was performed utilizing standard   protocol including spectral and color Doppler in all echocardiographic   windows.    Height: 175 cm  Weight: 113 kg  BSA: 2.2 sq m  Blood Pressure: 103/61    MEASUREMENTS  IVS: 1.5cm  PWT: 1.1cm  LA: 3.1cm  AO: 3.3cm  LVIDd: 4.2cm  LVIDs: 3.3cm    RVSP: 29mmHg    FINDINGS  Left Ventricle: The endocardium is not well-visualized. In limited views,   the LV function appears to be preserved with an estimated EF of 55%.   There is paradoxical motion of the septum in the setting of prior cardiac   surgery.  Aortic Valve: Calcified aortic valve with normal opening. Trace aortic   regurgitation  Mitral Valve: Thickened and calcified mitral valve leaflets with trace to   mild mitral regurgitation  Tricuspid Valve: Grossly normal tricuspid valve. Mild tricuspid   regurgitation.  Pulmonic Valve: Not well-visualized.  Left Atrium: Grossly normal. An interatrial septal aneurysm is noted   without obvious color flow across it  Right Ventricle: In limited views, grossly normal RV size.  Right Atrium: Grossly normal  Diastolic Function: Doppler evidence of rate 1 diastolic dysfunction  Pericardium/Pleura: No significant pericardial effusion.  Hemodynamics: The pulmonary artery systolic pressure is estimated to be   29 mmHg, assuming the right atrial pressure is equal to 8 mmHg,   consistent with normal pulmonary pressures.    CONCLUSIONS:  1. Technically difficult and limited study  2. The endocardium is not well-visualized. In limited views, the LV   function appears to be preserved with an estimated EF of 55%. There is   paradoxical motion of the septum in the setting of prior cardiac surgery.  3. Calcified aortic valve with normal opening. Trace aortic regurgitation  4. Thickened and calcified mitral valve leaflets with trace to mild   mitral regurgitation  5. Doppler evidence of rate 1 diastolic dysfunction  6. The interatrial septum appears aneurysmal  7. No significant pericardial effusion.      DEEP VILA   This document has been electronically signed. Mar 14 2019 10:16AM        < end of copied text >    Radiology:  < from: US Duplex Venous Lower Ext Complete, Bilateral (10.22.19 @ 13:43) >  EXAM:  US DPLX LWR EXT VEINS COMPL BI                            PROCEDURE DATE:  10/22/2019          INTERPRETATION:  CLINICAL INFORMATION: Lower extremity swelling.    COMPARISON: None available.    TECHNIQUE: Duplex sonography of the BILATERAL LOWER extremity veins with   color and spectral Doppler, with and without compression.      FINDINGS:    There is normal compressibility of the bilateral common femoral, femoral   and popliteal veins.     Doppler examination shows normal spontaneous and phasic flow.    No calf vein thrombosis is detected.    IMPRESSION:     No evidence of deep venous thrombosis in either lower extremity.        СЕРГЕЙ HURTADO M.D. ATTENDING RADIOLOGIST  This document has been electronically signed. Oct 22 2019  1:46PM    < end of copied text >

## 2019-10-24 NOTE — DISCHARGE NOTE NURSING/CASE MANAGEMENT/SOCIAL WORK - PATIENT PORTAL LINK FT
You can access the FollowMyHealth Patient Portal offered by [facility] by registering at the following website: [email or web address]. By joining [email or web address]’s FollowMyHealth portal, you will also be able to view your health information using other applications (apps) compatible with our system.

## 2019-10-24 NOTE — DISCHARGE NOTE PROVIDER - CARE PROVIDER_API CALL
Roshan Dejesus)  Internal Medicine  [address] [place] [address]  Phone: [phone]  Fax: [phone]  Follow Up Time:

## 2019-10-24 NOTE — PROGRESS NOTE ADULT - SUBJECTIVE AND OBJECTIVE BOX
Pulmonary/Critical Care Followup    PAST MEDICAL & SURGICAL HISTORY:  PVD (peripheral vascular disease)  Hypertension  COPD (chronic obstructive pulmonary disease): on 2L at home and BiPAP at night; intubated   Osteomyelitis  Dyslipidemia  CAD (Coronary Artery Disease): s/p 3v CABG ; stents [place]   Diabetes Mellitus, Type II  S/P primary angioplasty with coronary stent  Compound fracture: left leg  CABG (Coronary Artery Bypass Graft):       Allergies    No Known Allergies    Intolerances    shellfish (Nausea)      FAMILY HISTORY:  Family hx of lung cancer: brother,  age 82, used to smoke with pt  Family history of diabetes mellitus (Sibling)      SOCIAL HISTORY:      MEDICATIONS  (STANDING):  albuterol/ipratropium for Nebulization. 3 milliLiter(s) Nebulizer every 6 hours  aspirin  chewable 81 milliGRAM(s) Oral daily  atorvastatin 40 milliGRAM(s) Oral at bedtime  azithromycin   Tablet 500 milliGRAM(s) Oral every 24 hours  buDESOnide    Inhalation Suspension 0.5 milliGRAM(s) Inhalation two times a day  clopidogrel Tablet 75 milliGRAM(s) Oral daily  dextrose 5%. 1000 milliLiter(s) (50 mL/Hr) IV Continuous <Continuous>  dextrose 50% Injectable 12.5 Gram(s) IV Push once  dextrose 50% Injectable 25 Gram(s) IV Push once  dextrose 50% Injectable 25 Gram(s) IV Push once  enoxaparin Injectable 40 milliGRAM(s) SubCutaneous daily  insulin lispro (HumaLOG) corrective regimen sliding scale   SubCutaneous Before meals and at bedtime  metoprolol tartrate 25 milliGRAM(s) Oral two times a day  predniSONE   Tablet 40 milliGRAM(s) Oral every 24 hours    MEDICATIONS  (PRN):  dextrose 40% Gel 15 Gram(s) Oral once PRN Blood Glucose LESS THAN 70 milliGRAM(s)/deciliter  glucagon  Injectable 1 milliGRAM(s) IntraMuscular once PRN Glucose LESS THAN 70 milligrams/deciliter      Vital Signs Last 24 Hrs  T(C): 36.3 (24 Oct 2019 07:33), Max: 37 (23 Oct 2019 15:23)  T(F): 97.4 (24 Oct 2019 07:33), Max: 98.6 (23 Oct 2019 15:23)  HR: 82 (24 Oct 2019 08:17) (68 - 112)  BP: 115/62 (24 Oct 2019 07:33) (105/55 - 129/90)  BP(mean): 84 (23 Oct 2019 18:00) (73 - 106)  RR: 20 (24 Oct 2019 07:33) (19 - 38)  SpO2: 95% (24 Oct 2019 08:17) (89% - 100%)    LABS:                        10.2   12.39 )-----------( 243      ( 24 Oct 2019 06:55 )             33.1     10-24    141  |  102  |  27<H>  ----------------------------<  277<H>  4.2   |  34<H>  |  1.30    Ca    9.5      24 Oct 2019 06:55  Phos  3.1     10-24  Mg     2.1     10-24      PT/INR - ( 23 Oct 2019 05:57 )   PT: 11.5 sec;   INR: 1.02 ratio         PTT - ( 23 Oct 2019 05:57 )  PTT:30.5 sec      ABG - ( 23 Oct 2019 06:02 )  pH, Arterial: 7.40  pH, Blood: x     /  pCO2: 52    /  pO2: 138   / HCO3: 30    / Base Excess: 6.8   /  SaO2: 99                  WBC:  WBC Count: 12.39 K/uL (10-24 @ 06:55)  WBC Count: 9.61 K/uL (10-23 @ 05:57)  WBC Count: 16.37 K/uL (10-22 @ 08:04)      MICROBIOLOGY:  RECENT CULTURES:  10-23 .Blood Blood-Peripheral XXXX XXXX   No growth to date.          CARDIAC MARKERS ( 23 Oct 2019 05:57 )  <.015 ng/mL / x     / x     / x     / x      CARDIAC MARKERS ( 22 Oct 2019 22:42 )  <.015 ng/mL / x     / x     / x     / x      CARDIAC MARKERS ( 22 Oct 2019 15:10 )  .022 ng/mL / x     / x     / x     / x            PT/INR - ( 23 Oct 2019 05:57 )   PT: 11.5 sec;   INR: 1.02 ratio         PTT - ( 23 Oct 2019 05:57 )  PTT:30.5 sec    Sodium:  Sodium, Serum: 141 mmol/L (10-24 @ 06:55)  Sodium, Serum: 139 mmol/L (10-23 @ 14:56)  Sodium, Serum: 142 mmol/L (10-23 @ 05:57)  Sodium, Serum: 141 mmol/L (10-22 @ 08:04)      1.30 mg/dL 10-24 @ 06:55  1.40 mg/dL 10-23 @ 14:56  1.30 mg/dL 10-23 @ 05:57  1.10 mg/dL 10-22 @ 08:04      Hemoglobin:  Hemoglobin: 10.2 g/dL (10-24 @ 06:55)  Hemoglobin: 10.4 g/dL (10-23 @ 05:57)  Hemoglobin: 12.3 g/dL (10-22 @ 08:04)      Platelets: Platelet Count - Automated: 243 K/uL (10-24 @ 06:55)  Platelet Count - Automated: 234 K/uL (10-23 @ 05:57)  Platelet Count - Automated: 274 K/uL (10-22 @ 08:04)              RADIOLOGY & ADDITIONAL STUDIES: Pulmonary/Critical Care Followup    PAST MEDICAL & SURGICAL HISTORY:  PVD (peripheral vascular disease)  Hypertension  COPD (chronic obstructive pulmonary disease): on 2L at home and BiPAP at night; intubated   Osteomyelitis  Dyslipidemia  CAD (Coronary Artery Disease): s/p 3v CABG ; stents placed [place]   Diabetes Mellitus, Type II  S/P primary angioplasty with coronary stent  Compound fracture: left leg  CABG (Coronary Artery Bypass Graft):       Allergies    No Known Allergies    Intolerances    shellfish (Nausea)      FAMILY HISTORY:  Family hx of lung cancer: brother,  age 82, used to smoke with pt  Family history of diabetes mellitus (Sibling)      SOCIAL HISTORY:      MEDICATIONS  (STANDING):  albuterol/ipratropium for Nebulization. 3 milliLiter(s) Nebulizer every 6 hours  aspirin  chewable 81 milliGRAM(s) Oral daily  atorvastatin 40 milliGRAM(s) Oral at bedtime  azithromycin   Tablet 500 milliGRAM(s) Oral every 24 hours  buDESOnide    Inhalation Suspension 0.5 milliGRAM(s) Inhalation two times a day  clopidogrel Tablet 75 milliGRAM(s) Oral daily  dextrose 5%. 1000 milliLiter(s) (50 mL/Hr) IV Continuous <Continuous>  dextrose 50% Injectable 12.5 Gram(s) IV Push once  dextrose 50% Injectable 25 Gram(s) IV Push once  dextrose 50% Injectable 25 Gram(s) IV Push once  enoxaparin Injectable 40 milliGRAM(s) SubCutaneous daily  insulin lispro (HumaLOG) corrective regimen sliding scale   SubCutaneous Before meals and at bedtime  metoprolol tartrate 25 milliGRAM(s) Oral two times a day  predniSONE   Tablet 40 milliGRAM(s) Oral every 24 hours    MEDICATIONS  (PRN):  dextrose 40% Gel 15 Gram(s) Oral once PRN Blood Glucose LESS THAN 70 milliGRAM(s)/deciliter  glucagon  Injectable 1 milliGRAM(s) IntraMuscular once PRN Glucose LESS THAN 70 milligrams/deciliter      Vital Signs Last 24 Hrs  T(C): 36.3 (24 Oct 2019 07:33), Max: 37 (23 Oct 2019 15:23)  T(F): 97.4 (24 Oct 2019 07:33), Max: 98.6 (23 Oct 2019 15:23)  HR: 82 (24 Oct 2019 08:17) (68 - 112)  BP: 115/62 (24 Oct 2019 07:33) (105/55 - 129/90)  BP(mean): 84 (23 Oct 2019 18:00) (73 - 106)  RR: 20 (24 Oct 2019 07:33) (19 - 38)  SpO2: 95% (24 Oct 2019 08:17) (89% - 100%)    LABS:                        10.2   12.39 )-----------( 243      ( 24 Oct 2019 06:55 )             33.1     10-24    141  |  102  |  27<H>  ----------------------------<  277<H>  4.2   |  34<H>  |  1.30    Ca    9.5      24 Oct 2019 06:55  Phos  3.1     10-24  Mg     2.1     10-24      PT/INR - ( 23 Oct 2019 05:57 )   PT: 11.5 sec;   INR: 1.02 ratio         PTT - ( 23 Oct 2019 05:57 )  PTT:30.5 sec      ABG - ( 23 Oct 2019 06:02 )  pH, Arterial: 7.40  pH, Blood: x     /  pCO2: 52    /  pO2: 138   / HCO3: 30    / Base Excess: 6.8   /  SaO2: 99                  WBC:  WBC Count: 12.39 K/uL (10-24 @ 06:55)  WBC Count: 9.61 K/uL (10-23 @ 05:57)  WBC Count: 16.37 K/uL (10-22 @ 08:04)      MICROBIOLOGY:  RECENT CULTURES:  10-23 .Blood Blood-Peripheral XXXX XXXX   No growth to date.          CARDIAC MARKERS ( 23 Oct 2019 05:57 )  <.015 ng/mL / x     / x     / x     / x      CARDIAC MARKERS ( 22 Oct 2019 22:42 )  <.015 ng/mL / x     / x     / x     / x      CARDIAC MARKERS ( 22 Oct 2019 15:10 )  .022 ng/mL / x     / x     / x     / x            PT/INR - ( 23 Oct 2019 05:57 )   PT: 11.5 sec;   INR: 1.02 ratio         PTT - ( 23 Oct 2019 05:57 )  PTT:30.5 sec    Sodium:  Sodium, Serum: 141 mmol/L (10-24 @ 06:55)  Sodium, Serum: 139 mmol/L (10-23 @ 14:56)  Sodium, Serum: 142 mmol/L (10-23 @ 05:57)  Sodium, Serum: 141 mmol/L (10-22 @ 08:04)      1.30 mg/dL 10-24 @ 06:55  1.40 mg/dL 10-23 @ 14:56  1.30 mg/dL 10-23 @ 05:57  1.10 mg/dL 10-22 @ 08:04      Hemoglobin:  Hemoglobin: 10.2 g/dL (10-24 @ 06:55)  Hemoglobin: 10.4 g/dL (10-23 @ 05:57)  Hemoglobin: 12.3 g/dL (10-22 @ 08:04)      Platelets: Platelet Count - Automated: 243 K/uL (10-24 @ 06:55)  Platelet Count - Automated: 234 K/uL (10-23 @ 05:57)  Platelet Count - Automated: 274 K/uL (10-22 @ 08:04)              RADIOLOGY & ADDITIONAL STUDIES:  PATIENT COMMODORE YUE  1940 DOA 10/22/2019 [facility] P [phone]   VITALS/LABS       10/24/2019 87 120/70 18 96%   10/24/2019 W 12.2 Hb 10.2 Plt 243 Na 141 K 4.2 CO2 34 Cr 1.3     REVIEW OF SYMPTOMS     NOTE Noteworthy changes  if any  in ROS and PE are also entered  in note  below      Able to give ROS  Yes     RELIABLE No   CONSTITUTIONAL Weakness Yes  Chills No Vision changes No  ENDOCRINE No unexplained hair loss No heat or cold intolerance    ALLERGY No hives  Sore throat No   RESP Coughing blood no  Shortness of breath YES   NEURO No Headache  Confusion Pain neck No   CARDIAC No Chest pain No Palpitations   GI No Pain abdomen NO   Vomiting NO     PHYSICAL EXAM    HEENT Unremarkable PERRLA atraumatic   RESP Fair air entry EXP prolonged    Harsh breath sound Resp distres mild   CARDIAC S1 S2 No S3     NO JVD    ABDOMEN SOFT BS PRESENT NOT DISTENDED No hepatosplenomegaly PEDAL EDEMA present No calf tenderness  NO rash   GENERAL Not TOXIC looking    PATIENT COMMODORE YUE  1940 DOA 10/22/2019 [facility] P [phone]                           PATIENT DATA   ALLERGY shellfish  ADVANCED DIRECTIVE       WT  112             BMI                                                                                                                                            HEAD OF BED ELEVATION Yes  EKG     10/17 ekg s tachy               CXR      10/22 cxr napd              DVT PROPHYLAXIS    lvnx 40 (10/22)      STRESS ULCER PROPHYLAXIS  INFECTION PPLX                                                                                 DIET  DYSPHAGIA EVAL IF APPLICABLE                                                                    GAS EXCHANGE   10/24/2019 96%   10/23/2019 6a bpap .3 740/52/138   10/22/2019 1p /.3 726/68/101  10/22/2019 10a 18/.4 720/84/142   10/22/2019 8a nrb 722/79/184                                                                                                                                                           HEMODYNAMICS  DRIPS                                                                                          IV FLUIDS  INTAKE OUTPUT                                                     MICROBIO      10/22/2019 pc n   10/22/2019 flu ab n rsv n   ANTIBIOTICS        azithro (10/23  INDWELLING TUBES  LINES                                                        PATIENT COMMODORE YUE  1940 DOA 10/22/2019 [facility] P [phone]                           Pt description                            CC    palpitations r side face weak     er vitals     203/89 140 28 afeb                   HPI              79 m former smoker PMH HTN, DM2 (on home Metformin and glipizide), COPD (2L home/ BIPAP at night), CAD with 3v CABG , Stent 2019 HLD, 10/26/2018 ECHO ef 55% hx hypercapnic resp failure with ho intubation c/b with cardiac arrest (2018) with ho recurrent admissions GOLD D recent admission  2019 and 10/2019 with copd ex ac on chr hypercapnic resp failure  was  admitted with SVT Rxed with adenosine ac hypercapnic resp failure     home meds

## 2019-10-24 NOTE — DISCHARGE NOTE PROVIDER - HOSPITAL COURSE
80yo male with pmhx of COPD (on home O2 2L, and bipap at night and prn), CAD w/ stents (most recent 8/2019), CABG x3, HLD, DM2 (not insulin dependent), hx of hypercapnic resp failure and cardiac arrest requiring intubation (2018), osteomyelitis, presented to ED with worsening sob and wob a/w acute on chronic respiratory failure with hypercarbia requiring  continuous bipap sec to acute copd exacerbation. PE ruled out. Monitored in ICU. Symptoms improved and was taken off of BIPAP. Seen by Pulm Dr. Dejesus. Transferred to floor. Symptoms resolved. Wants to go home and cleared by Dr. Dejesus from Pulm. Will f/u as out patient.

## 2019-10-28 ENCOUNTER — APPOINTMENT (OUTPATIENT)
Dept: VASCULAR SURGERY | Facility: CLINIC | Age: 79
End: 2019-10-28
Payer: MEDICARE

## 2019-10-28 PROCEDURE — 99213 OFFICE O/P EST LOW 20 MIN: CPT

## 2019-10-28 PROCEDURE — 93924 LWR XTR VASC STDY BILAT: CPT

## 2019-10-28 NOTE — HISTORY OF PRESENT ILLNESS
[FreeTextEntry1] : 77 yo male with history of dm, htn, copd, pad s/p b/l iliac stents and right lower extremity sfa stent.  pt reports left lower extremity stable claudications of less then One block  of the left lower extremity.  Status post recent coronary angioplasty and stent placement

## 2019-10-28 NOTE — ASSESSMENT
[FreeTextEntry1] : 77 yo male with history of  dm, htn, copd, pad s/p b/l iliac stents and right lower extremity sfa stent presents for follow up with persistant claudication of the left worse than the right .  Status post recent coronary angioplasty and stent placement. Bilateral lower extremity symptoms have become much more severe\par Plan for angiogram and angioplasty.

## 2019-11-12 ENCOUNTER — FORM ENCOUNTER (OUTPATIENT)
Age: 79
End: 2019-11-12

## 2019-11-12 PROBLEM — I10 HTN (HYPERTENSION): Status: ACTIVE | Noted: 2019-11-12

## 2019-11-12 PROCEDURE — 93970 EXTREMITY STUDY: CPT

## 2019-11-12 PROCEDURE — 99221 1ST HOSP IP/OBS SF/LOW 40: CPT

## 2019-11-12 PROCEDURE — 71275 CT ANGIOGRAPHY CHEST: CPT

## 2019-11-12 PROCEDURE — 82553 CREATINE MB FRACTION: CPT

## 2019-11-12 PROCEDURE — 82962 GLUCOSE BLOOD TEST: CPT

## 2019-11-12 PROCEDURE — 87798 DETECT AGENT NOS DNA AMP: CPT

## 2019-11-12 PROCEDURE — 87040 BLOOD CULTURE FOR BACTERIA: CPT

## 2019-11-12 PROCEDURE — 94760 N-INVAS EAR/PLS OXIMETRY 1: CPT

## 2019-11-12 PROCEDURE — 94660 CPAP INITIATION&MGMT: CPT

## 2019-11-12 PROCEDURE — 97161 PT EVAL LOW COMPLEX 20 MIN: CPT

## 2019-11-12 PROCEDURE — 85730 THROMBOPLASTIN TIME PARTIAL: CPT

## 2019-11-12 PROCEDURE — 93005 ELECTROCARDIOGRAM TRACING: CPT

## 2019-11-12 PROCEDURE — 80048 BASIC METABOLIC PNL TOTAL CA: CPT

## 2019-11-12 PROCEDURE — 84145 PROCALCITONIN (PCT): CPT

## 2019-11-12 PROCEDURE — 87486 CHLMYD PNEUM DNA AMP PROBE: CPT

## 2019-11-12 PROCEDURE — 87581 M.PNEUMON DNA AMP PROBE: CPT

## 2019-11-12 PROCEDURE — 36600 WITHDRAWAL OF ARTERIAL BLOOD: CPT

## 2019-11-12 PROCEDURE — 36415 COLL VENOUS BLD VENIPUNCTURE: CPT

## 2019-11-12 PROCEDURE — 96374 THER/PROPH/DIAG INJ IV PUSH: CPT

## 2019-11-12 PROCEDURE — 87633 RESP VIRUS 12-25 TARGETS: CPT

## 2019-11-12 PROCEDURE — 82803 BLOOD GASES ANY COMBINATION: CPT

## 2019-11-12 PROCEDURE — 85027 COMPLETE CBC AUTOMATED: CPT

## 2019-11-12 PROCEDURE — 99285 EMERGENCY DEPT VISIT HI MDM: CPT | Mod: 25

## 2019-11-12 PROCEDURE — 82550 ASSAY OF CK (CPK): CPT

## 2019-11-12 PROCEDURE — 85610 PROTHROMBIN TIME: CPT

## 2019-11-12 PROCEDURE — 87631 RESP VIRUS 3-5 TARGETS: CPT

## 2019-11-12 PROCEDURE — 84484 ASSAY OF TROPONIN QUANT: CPT

## 2019-11-12 PROCEDURE — 83735 ASSAY OF MAGNESIUM: CPT

## 2019-11-12 PROCEDURE — 80053 COMPREHEN METABOLIC PANEL: CPT

## 2019-11-12 PROCEDURE — 94640 AIRWAY INHALATION TREATMENT: CPT

## 2019-11-12 PROCEDURE — 83880 ASSAY OF NATRIURETIC PEPTIDE: CPT

## 2019-11-12 PROCEDURE — 70450 CT HEAD/BRAIN W/O DYE: CPT

## 2019-11-12 PROCEDURE — 71045 X-RAY EXAM CHEST 1 VIEW: CPT

## 2019-11-12 PROCEDURE — 84100 ASSAY OF PHOSPHORUS: CPT

## 2019-11-12 RX ORDER — ALBUTEROL SULFATE 2.5 MG/3ML
(2.5 MG/3ML) SOLUTION RESPIRATORY (INHALATION)
Qty: 180 | Refills: 0 | Status: DISCONTINUED | COMMUNITY
Start: 2017-07-10 | End: 2019-11-12

## 2019-11-12 RX ORDER — TAMSULOSIN HYDROCHLORIDE 0.4 MG/1
0.4 CAPSULE ORAL
Refills: 0 | Status: ACTIVE | COMMUNITY

## 2019-11-13 ENCOUNTER — APPOINTMENT (OUTPATIENT)
Dept: ENDOVASCULAR SURGERY | Facility: CLINIC | Age: 79
End: 2019-11-13
Payer: MEDICARE

## 2019-11-13 ENCOUNTER — LABORATORY RESULT (OUTPATIENT)
Age: 79
End: 2019-11-13

## 2019-11-13 VITALS
WEIGHT: 209 LBS | HEIGHT: 71 IN | OXYGEN SATURATION: 92 % | DIASTOLIC BLOOD PRESSURE: 73 MMHG | BODY MASS INDEX: 29.26 KG/M2 | HEART RATE: 102 BPM | RESPIRATION RATE: 15 BRPM | TEMPERATURE: 98 F | SYSTOLIC BLOOD PRESSURE: 132 MMHG

## 2019-11-13 DIAGNOSIS — I10 ESSENTIAL (PRIMARY) HYPERTENSION: ICD-10-CM

## 2019-11-13 PROCEDURE — 37227Z: CUSTOM | Mod: LT

## 2019-11-13 PROCEDURE — 75625 CONTRAST EXAM ABDOMINL AORTA: CPT

## 2019-11-13 PROCEDURE — 37221Z: CUSTOM | Mod: 59,LT

## 2019-11-13 RX ORDER — BUDESONIDE AND FORMOTEROL FUMARATE DIHYDRATE 160; 4.5 UG/1; UG/1
160-4.5 AEROSOL RESPIRATORY (INHALATION)
Qty: 10 | Refills: 0 | Status: DISCONTINUED | COMMUNITY
Start: 2017-07-10 | End: 2019-11-13

## 2019-11-13 NOTE — PHYSICAL EXAM
[Normal Breath Sounds] : Normal breath sounds [Normal Heart Sounds] : normal heart sounds [2+] : left 2+ [Oriented to Place] : oriented to place [JVD] : no jugular venous distention  [Abdomen Masses] : No abdominal masses [] : not present [Ankle Swelling (On Exam)] : not present [Varicose Veins Of Lower Extremities] : not present [Skin Ulcer] : no ulcer

## 2019-11-13 NOTE — PROCEDURE
[D/C IV on discharge] : D/C IV on discharge [Resume diet] : resume diet [Groin check for bleeding/hematoma, vitals checked, and Doppler/pulse checked on admission, then every 15 minutes for an hour and every 30 minutes for 3 hours thereafter.] : Groin check for bleeding/hematoma, vitals checked, and Doppler/pulse checked on admission, then every 15 minutes for an hour and every 30 minutes for 3 hours thereafter.  [Keep flat for 2 hours, then elevate head gradually as tolerated] : Keep flat for 2 hours, then elevate head gradually as tolerated [Discharge at _____] : Discharge at [unfilled]. [Resume Diet] : resume diet [FreeTextEntry1] : aortogram, left leg angiogram,atherectomy/angioplasty /stent

## 2019-11-13 NOTE — HISTORY OF PRESENT ILLNESS
[FreeTextEntry1] : Cr: 1.30 10/24/2019\par accompanied by wife Himanshu [phone]\par recent hospitalization for respiratory distress\par took blood pressure medications this morning \par  reported blood sugar 116 [FreeTextEntry5] : yesterday at 7 pm [FreeTextEntry6] : Dr. Miranda

## 2019-11-13 NOTE — PAST MEDICAL HISTORY
[Increasing age ( >40 years old)] : Increasing age ( >40 years old) [No therapy indicated for cases scheduled for less than one hour] : No therapy indicated for cases scheduled for less than one hour. [FreeTextEntry1] : Malignant Hyperthermia Screening Tool and Risk of Bleeding Assessment\par \par Mr. YUE COTTO denies family history of unexpected death following Anesthesia or Exercise.\par Denies Family history of Malignant Hyperthermia, Muscle or Neuromuscular disorder and High Temperature following exercise.\par \par Mr. YUE COTTO denies history of Muscle Spasm, Dark or Chocolate - Colored urine and Unanticipated fever immediately following anesthesia or serious exercise. \par Mr. COTTO also denies bleeding tendencies/ Risks of Bleeding.\par

## 2019-11-15 ENCOUNTER — APPOINTMENT (OUTPATIENT)
Dept: PULMONOLOGY | Facility: CLINIC | Age: 79
End: 2019-11-15
Payer: MEDICARE

## 2019-11-15 ENCOUNTER — EMERGENCY (EMERGENCY)
Facility: HOSPITAL | Age: 79
LOS: 1 days | Discharge: HOME | End: 2019-11-15
Attending: EMERGENCY MEDICINE | Admitting: EMERGENCY MEDICINE
Payer: COMMERCIAL

## 2019-11-15 VITALS
WEIGHT: 210.98 LBS | TEMPERATURE: 98 F | SYSTOLIC BLOOD PRESSURE: 141 MMHG | OXYGEN SATURATION: 97 % | DIASTOLIC BLOOD PRESSURE: 68 MMHG | HEIGHT: 71 IN | RESPIRATION RATE: 24 BRPM | HEART RATE: 96 BPM

## 2019-11-15 VITALS
HEART RATE: 72 BPM | SYSTOLIC BLOOD PRESSURE: 123 MMHG | TEMPERATURE: 98 F | RESPIRATION RATE: 17 BRPM | OXYGEN SATURATION: 97 % | DIASTOLIC BLOOD PRESSURE: 76 MMHG

## 2019-11-15 VITALS
DIASTOLIC BLOOD PRESSURE: 87 MMHG | SYSTOLIC BLOOD PRESSURE: 153 MMHG | WEIGHT: 209 LBS | OXYGEN SATURATION: 96 % | HEIGHT: 71 IN | BODY MASS INDEX: 29.26 KG/M2 | HEART RATE: 96 BPM

## 2019-11-15 DIAGNOSIS — Z95.5 PRESENCE OF CORONARY ANGIOPLASTY IMPLANT AND GRAFT: Chronic | ICD-10-CM

## 2019-11-15 DIAGNOSIS — T14.8XXA OTHER INJURY OF UNSPECIFIED BODY REGION, INITIAL ENCOUNTER: Chronic | ICD-10-CM

## 2019-11-15 LAB
ALBUMIN SERPL ELPH-MCNC: 2.5 G/DL — LOW (ref 3.3–5)
ALP SERPL-CCNC: 68 U/L — SIGNIFICANT CHANGE UP (ref 40–120)
ALT FLD-CCNC: 20 U/L — SIGNIFICANT CHANGE UP (ref 12–78)
ANION GAP SERPL CALC-SCNC: 4 MMOL/L — LOW (ref 5–17)
APTT BLD: 35 SEC — SIGNIFICANT CHANGE UP (ref 28.5–37)
AST SERPL-CCNC: 18 U/L — SIGNIFICANT CHANGE UP (ref 15–37)
BASE EXCESS BLDA CALC-SCNC: 4.7 MMOL/L — HIGH (ref -2–2)
BASOPHILS # BLD AUTO: 0.03 K/UL — SIGNIFICANT CHANGE UP (ref 0–0.2)
BASOPHILS NFR BLD AUTO: 0.4 % — SIGNIFICANT CHANGE UP (ref 0–2)
BILIRUB SERPL-MCNC: 0.2 MG/DL — SIGNIFICANT CHANGE UP (ref 0.2–1.2)
BLOOD GAS COMMENTS ARTERIAL: SIGNIFICANT CHANGE UP
BLOOD GAS COMMENTS ARTERIAL: SIGNIFICANT CHANGE UP
BUN SERPL-MCNC: 10 MG/DL — SIGNIFICANT CHANGE UP (ref 7–23)
CALCIUM SERPL-MCNC: 6.9 MG/DL — LOW (ref 8.5–10.1)
CHLORIDE SERPL-SCNC: 114 MMOL/L — HIGH (ref 96–108)
CO2 SERPL-SCNC: 27 MMOL/L — SIGNIFICANT CHANGE UP (ref 22–31)
CREAT SERPL-MCNC: 0.71 MG/DL — SIGNIFICANT CHANGE UP (ref 0.5–1.3)
D DIMER BLD IA.RAPID-MCNC: 208 NG/ML DDU — SIGNIFICANT CHANGE UP
EOSINOPHIL # BLD AUTO: 0.18 K/UL — SIGNIFICANT CHANGE UP (ref 0–0.5)
EOSINOPHIL NFR BLD AUTO: 2.1 % — SIGNIFICANT CHANGE UP (ref 0–6)
GLUCOSE SERPL-MCNC: 179 MG/DL — HIGH (ref 70–99)
HCO3 BLDA-SCNC: 28 MMOL/L — HIGH (ref 23–27)
HCT VFR BLD CALC: 37.8 % — LOW (ref 39–50)
HGB BLD-MCNC: 11.4 G/DL — LOW (ref 13–17)
IMM GRANULOCYTES NFR BLD AUTO: 0.6 % — SIGNIFICANT CHANGE UP (ref 0–1.5)
INR BLD: 0.97 RATIO — SIGNIFICANT CHANGE UP (ref 0.88–1.16)
LYMPHOCYTES # BLD AUTO: 0.87 K/UL — LOW (ref 1–3.3)
LYMPHOCYTES # BLD AUTO: 10.2 % — LOW (ref 13–44)
MCHC RBC-ENTMCNC: 27 PG — SIGNIFICANT CHANGE UP (ref 27–34)
MCHC RBC-ENTMCNC: 30.2 GM/DL — LOW (ref 32–36)
MCV RBC AUTO: 89.6 FL — SIGNIFICANT CHANGE UP (ref 80–100)
MONOCYTES # BLD AUTO: 0.88 K/UL — SIGNIFICANT CHANGE UP (ref 0–0.9)
MONOCYTES NFR BLD AUTO: 10.3 % — SIGNIFICANT CHANGE UP (ref 2–14)
NEUTROPHILS # BLD AUTO: 6.53 K/UL — SIGNIFICANT CHANGE UP (ref 1.8–7.4)
NEUTROPHILS NFR BLD AUTO: 76.4 % — SIGNIFICANT CHANGE UP (ref 43–77)
NRBC # BLD: 0 /100 WBCS — SIGNIFICANT CHANGE UP (ref 0–0)
PCO2 BLDA: 54 MMHG — HIGH (ref 32–46)
PH BLDA: 7.36 — SIGNIFICANT CHANGE UP (ref 7.35–7.45)
PLATELET # BLD AUTO: 283 K/UL — SIGNIFICANT CHANGE UP (ref 150–400)
PO2 BLDA: 128 MMHG — HIGH (ref 74–108)
POTASSIUM SERPL-MCNC: 4 MMOL/L — SIGNIFICANT CHANGE UP (ref 3.5–5.3)
POTASSIUM SERPL-SCNC: 4 MMOL/L — SIGNIFICANT CHANGE UP (ref 3.5–5.3)
PROT SERPL-MCNC: 4.5 G/DL — LOW (ref 6–8.3)
PROTHROM AB SERPL-ACNC: 11 SEC — SIGNIFICANT CHANGE UP (ref 10–12.9)
RBC # BLD: 4.22 M/UL — SIGNIFICANT CHANGE UP (ref 4.2–5.8)
RBC # FLD: 13.8 % — SIGNIFICANT CHANGE UP (ref 10.3–14.5)
SAO2 % BLDA: 99 % — HIGH (ref 92–96)
SODIUM SERPL-SCNC: 145 MMOL/L — SIGNIFICANT CHANGE UP (ref 135–145)
WBC # BLD: 8.54 K/UL — SIGNIFICANT CHANGE UP (ref 3.8–10.5)
WBC # FLD AUTO: 8.54 K/UL — SIGNIFICANT CHANGE UP (ref 3.8–10.5)

## 2019-11-15 PROCEDURE — 85610 PROTHROMBIN TIME: CPT

## 2019-11-15 PROCEDURE — 99284 EMERGENCY DEPT VISIT MOD MDM: CPT | Mod: 25

## 2019-11-15 PROCEDURE — 93970 EXTREMITY STUDY: CPT | Mod: 26

## 2019-11-15 PROCEDURE — 85027 COMPLETE CBC AUTOMATED: CPT

## 2019-11-15 PROCEDURE — 85730 THROMBOPLASTIN TIME PARTIAL: CPT

## 2019-11-15 PROCEDURE — 80053 COMPREHEN METABOLIC PANEL: CPT

## 2019-11-15 PROCEDURE — 94640 AIRWAY INHALATION TREATMENT: CPT

## 2019-11-15 PROCEDURE — 36415 COLL VENOUS BLD VENIPUNCTURE: CPT

## 2019-11-15 PROCEDURE — 93970 EXTREMITY STUDY: CPT

## 2019-11-15 PROCEDURE — 99214 OFFICE O/P EST MOD 30 MIN: CPT

## 2019-11-15 PROCEDURE — 85379 FIBRIN DEGRADATION QUANT: CPT

## 2019-11-15 PROCEDURE — 82803 BLOOD GASES ANY COMBINATION: CPT

## 2019-11-15 PROCEDURE — 96374 THER/PROPH/DIAG INJ IV PUSH: CPT

## 2019-11-15 PROCEDURE — 71045 X-RAY EXAM CHEST 1 VIEW: CPT

## 2019-11-15 PROCEDURE — 36600 WITHDRAWAL OF ARTERIAL BLOOD: CPT

## 2019-11-15 PROCEDURE — 71045 X-RAY EXAM CHEST 1 VIEW: CPT | Mod: 26

## 2019-11-15 PROCEDURE — 99285 EMERGENCY DEPT VISIT HI MDM: CPT

## 2019-11-15 RX ORDER — IPRATROPIUM/ALBUTEROL SULFATE 18-103MCG
3 AEROSOL WITH ADAPTER (GRAM) INHALATION ONCE
Refills: 0 | Status: COMPLETED | OUTPATIENT
Start: 2019-11-15 | End: 2019-11-15

## 2019-11-15 RX ADMIN — Medication 3 MILLILITER(S): at 18:18

## 2019-11-15 RX ADMIN — Medication 125 MILLIGRAM(S): at 18:18

## 2019-11-15 NOTE — ED PROVIDER NOTE - PROGRESS NOTE DETAILS
d/w katie (pulm) he reviewed all results, requests d/c with instructions to increase prednisone from 10mg to 20mg x 3 days and to f/u as outpt if feels well enough to go home. Reevaluated patient at bedside.  Patient feeling improved, doesn't want admission, wants to be d/c home to f/u as outpt.  Discussed the results of all diagnostic testing in ED and copies of all reports given.   An opportunity to ask questions was given.  Discussed the importance of prompt, close medical follow-up.  Patient will return with any changes, concerns or persistent / worsening symptoms.  Understanding of all instructions verbalized.

## 2019-11-15 NOTE — ED PROVIDER NOTE - CARE PROVIDER_API CALL
Arun Dejesus)  Critical Care Medicine; Internal Medicine; Pulmonary Disease  [address] [place] [address]  Phone: [phone]  Fax: [phone]  Follow Up Time: 1-3 Days    Sarah [place]ila)  [place] [address]  Phone: [phone]  Fax: [phone]  Follow Up Time: 1-3 Days

## 2019-11-15 NOTE — PHYSICAL EXAM
[General Appearance - Well Developed] : well developed [Normal Appearance] : normal appearance [Well Groomed] : well groomed [General Appearance - Well Nourished] : well nourished [No Deformities] : no deformities [General Appearance - In No Acute Distress] : no acute distress [Normal Conjunctiva] : the conjunctiva exhibited no abnormalities [Eyelids - No Xanthelasma] : the eyelids demonstrated no xanthelasmas [Normal Oropharynx] : normal oropharynx [Neck Appearance] : the appearance of the neck was normal [Neck Cervical Mass (___cm)] : no neck mass was observed [Jugular Venous Distention Increased] : there was no jugular-venous distention [Thyroid Diffuse Enlargement] : the thyroid was not enlarged [Thyroid Nodule] : there were no palpable thyroid nodules [Respiration, Rhythm And Depth] : normal respiratory rhythm and effort [Exaggerated Use Of Accessory Muscles For Inspiration] : no accessory muscle use [Auscultation Breath Sounds / Voice Sounds] : lungs were clear to auscultation bilaterally [Abdomen Soft] : soft [Abdomen Tenderness] : non-tender [Abdomen Mass (___ Cm)] : no abdominal mass palpated [Abnormal Walk] : normal gait [Gait - Sufficient For Exercise Testing] : the gait was sufficient for exercise testing [Nail Clubbing] : no clubbing of the fingernails [Cyanosis, Localized] : no localized cyanosis [Petechial Hemorrhages (___cm)] : no petechial hemorrhages [] : no ischemic changes [FreeTextEntry1] : dyspnic on mild exertn

## 2019-11-15 NOTE — HISTORY OF PRESENT ILLNESS
[FreeTextEntry1] : PATIENT COMMODORE TURNER MRN [id number]   1940  \par HPI INITIAL PRESENTATION\par *********************** \par DATE INITIAL VISIT   2019                      \par CC               dyspnea         \par HPI\par Was referred for management of advanced copd \par Has bipap since 2018 and O2 at home  On O2 since 2018 Got Inogen (2019)\par \par \par \par PT DESCRIPTION \par ***************\par DATE OF INITIAL VISIT  2019                 \par AGE AT INITIAL VISIT   79 m                      \par REFERRING MD Dr Sarah Avila                \par REFERRING MD FAX                 \par CONTACT          [phone]                    \par ALLERGY       nka                   \par WEIGHT      11/15/2019 209                   \par BMI               11/15/2019 29               \par SMOKING      11/15/2019 Former smoker Quit 30 y              \par HABITS      11/15/2019 No ETOH abuse              \par \par     \par FAMILY     \par OCCUPATION    [employer]          \par PETS     11/15/2019 Had dog which was sent away 2019                  \par TRAVEL\par COUNTRY OF BIRTH OR SIGNIFICANT HABITATION      \par PMH/PSH       \par MEDICATIONS  \par 11/15/2019 Is also on Pred 10 (since 10/2019)\par 2019   \par Breo 200  Incruse  Daliresp\par Albuterol neb p (2019 Avge 3/d) \par Ventolin p \par Glipizide 2.5x2  metformin 1000.2  Lopressor 12.5x2  Valsart 80   Tamsulosin .4 \par Atorvastat 40 \par Plavix 75  \par \par \par DATE COMPLAINTS NOTABLE POINTS  ROS  PE   \par 11/15/2019  Pt had been doing relatively well till his son visited from out of state and he had contact with his old dog for an hour or so (son had [employer] dog along) He has been increasingly sob and hypoxic since then which was a few d ago  \par 11/15/2019 Pt also had arterial stent placed by vasc surgeon few d ago \par

## 2019-11-15 NOTE — ASSESSMENT
[FreeTextEntry1] : PROBLEM ASSESSMENT/RECOMMENDATIONS (A/R) \par COPD \par A/R \par 11/15/2019 Advise dto go to er so we can do Venous duplex legs get cxr abg and labs and see if he needs to be admitted RTC 6 w \par

## 2019-11-15 NOTE — ED PROVIDER NOTE - PATIENT PORTAL LINK FT
You can access the FollowMyHealth Patient Portal offered by [facility] by registering at the following website: [email or web address]. By joining [facility]’s FollowMyHealth portal, you will also be able to view your health information using other applications (apps) compatible with our system.

## 2019-11-15 NOTE — ED PROVIDER NOTE - PROVIDER TOKENS
PROVIDER:[TOKEN:[[id number]],FOLLOWUP:[1-3 Days]],PROVIDER:[TOKEN:[[id number]],FOLLOWUP:[1-3 Days]]

## 2019-11-15 NOTE — ED ADULT NURSE NOTE - ED STAT RN HANDOFF DETAILS
Pt stable and resting in bed. Pt denies any pain or discomfort as of this time. Pt handoff report giver to RACHEL Jose  for continuum of care. No sign of distress noted

## 2019-11-15 NOTE — ED PROVIDER NOTE - PMH
CAD (Coronary Artery Disease)  s/p 3v CABG 2004; stents placed [place] in 2019  COPD (chronic obstructive pulmonary disease)  on 2L at home and BiPAP at night; intubated 6/18  Diabetes Mellitus, Type II    Dyslipidemia    Hypertension    Osteomyelitis    PVD (peripheral vascular disease)

## 2019-11-15 NOTE — ED ADULT NURSE NOTE - OBJECTIVE STATEMENT
Received pt in bed alert and oriented x4.  C/O diff breathing since earlier today.  Pt hx of COPD.  Pt on 2Lnc at home.  Pt complaints of cough as well.  Pt denies chest pain, n/v/d.  Pt on monitor.  Ongoing nursing care and safety maintained.

## 2019-11-15 NOTE — ED ADULT NURSE REASSESSMENT NOTE - NS ED NURSE REASSESS COMMENT FT1
Received report from Regla RAMOS. Pt is resting in bed. AO4. Family at bedside. Pt taken down for USG Doppler. Will ctm when back.

## 2019-11-18 ENCOUNTER — APPOINTMENT (OUTPATIENT)
Dept: PULMONOLOGY | Facility: CLINIC | Age: 79
End: 2019-11-18

## 2019-12-02 ENCOUNTER — APPOINTMENT (OUTPATIENT)
Dept: VASCULAR SURGERY | Facility: CLINIC | Age: 79
End: 2019-12-02
Payer: MEDICARE

## 2019-12-02 PROCEDURE — 93926 LOWER EXTREMITY STUDY: CPT

## 2019-12-02 PROCEDURE — 99213 OFFICE O/P EST LOW 20 MIN: CPT

## 2019-12-02 NOTE — ASSESSMENT
[FreeTextEntry1] : Patient with peripheral vascular disease, status post left lower extremity intervention. Patient doing much better. Followup in 3 months

## 2019-12-02 NOTE — HISTORY OF PRESENT ILLNESS
[FreeTextEntry1] : Patient with severe peripheral last few disease, status post left iliac and SFA and popliteal artery stent placement. Patient doing better. Left leg claudication has improved.

## 2019-12-02 NOTE — PHYSICAL EXAM
[JVD] : no jugular venous distention  [Normal Heart Sounds] : normal heart sounds [Normal Breath Sounds] : Normal breath sounds [2+] : left 2+ [] : not present [Ankle Swelling (On Exam)] : not present [Varicose Veins Of Lower Extremities] : not present [Skin Ulcer] : no ulcer [Abdomen Masses] : No abdominal masses [Oriented to Place] : oriented to place

## 2019-12-05 ENCOUNTER — EMERGENCY (EMERGENCY)
Facility: HOSPITAL | Age: 79
LOS: 1 days | Discharge: HOME | End: 2019-12-05
Attending: EMERGENCY MEDICINE | Admitting: EMERGENCY MEDICINE
Payer: COMMERCIAL

## 2019-12-05 ENCOUNTER — OTHER (OUTPATIENT)
Age: 79
End: 2019-12-05

## 2019-12-05 VITALS
TEMPERATURE: 98 F | DIASTOLIC BLOOD PRESSURE: 75 MMHG | HEART RATE: 87 BPM | RESPIRATION RATE: 18 BRPM | OXYGEN SATURATION: 100 % | SYSTOLIC BLOOD PRESSURE: 135 MMHG

## 2019-12-05 VITALS
OXYGEN SATURATION: 100 % | WEIGHT: 210.1 LBS | DIASTOLIC BLOOD PRESSURE: 79 MMHG | SYSTOLIC BLOOD PRESSURE: 126 MMHG | RESPIRATION RATE: 20 BRPM | TEMPERATURE: 98 F | HEIGHT: 71 IN | HEART RATE: 94 BPM

## 2019-12-05 DIAGNOSIS — Z95.5 PRESENCE OF CORONARY ANGIOPLASTY IMPLANT AND GRAFT: Chronic | ICD-10-CM

## 2019-12-05 DIAGNOSIS — T14.8XXA OTHER INJURY OF UNSPECIFIED BODY REGION, INITIAL ENCOUNTER: Chronic | ICD-10-CM

## 2019-12-05 LAB
ALBUMIN SERPL ELPH-MCNC: 3.6 G/DL — SIGNIFICANT CHANGE UP (ref 3.3–5)
ALP SERPL-CCNC: 74 U/L — SIGNIFICANT CHANGE UP (ref 40–120)
ALT FLD-CCNC: 42 U/L — SIGNIFICANT CHANGE UP (ref 12–78)
ANION GAP SERPL CALC-SCNC: 4 MMOL/L — LOW (ref 5–17)
AST SERPL-CCNC: 35 U/L — SIGNIFICANT CHANGE UP (ref 15–37)
BASE EXCESS BLDV CALC-SCNC: 6.4 MMOL/L — HIGH (ref -2–2)
BASOPHILS # BLD AUTO: 0.04 K/UL — SIGNIFICANT CHANGE UP (ref 0–0.2)
BASOPHILS NFR BLD AUTO: 0.4 % — SIGNIFICANT CHANGE UP (ref 0–2)
BILIRUB SERPL-MCNC: 0.4 MG/DL — SIGNIFICANT CHANGE UP (ref 0.2–1.2)
BLOOD GAS COMMENTS, VENOUS: SIGNIFICANT CHANGE UP
BLOOD GAS COMMENTS, VENOUS: SIGNIFICANT CHANGE UP
BUN SERPL-MCNC: 10 MG/DL — SIGNIFICANT CHANGE UP (ref 7–23)
CALCIUM SERPL-MCNC: 9 MG/DL — SIGNIFICANT CHANGE UP (ref 8.5–10.1)
CHLORIDE SERPL-SCNC: 104 MMOL/L — SIGNIFICANT CHANGE UP (ref 96–108)
CO2 SERPL-SCNC: 32 MMOL/L — HIGH (ref 22–31)
CREAT SERPL-MCNC: 1.1 MG/DL — SIGNIFICANT CHANGE UP (ref 0.5–1.3)
EOSINOPHIL # BLD AUTO: 0.39 K/UL — SIGNIFICANT CHANGE UP (ref 0–0.5)
EOSINOPHIL NFR BLD AUTO: 3.6 % — SIGNIFICANT CHANGE UP (ref 0–6)
GLUCOSE SERPL-MCNC: 197 MG/DL — HIGH (ref 70–99)
HCO3 BLDV-SCNC: 29 MMOL/L — SIGNIFICANT CHANGE UP (ref 21–29)
HCT VFR BLD CALC: 40.6 % — SIGNIFICANT CHANGE UP (ref 39–50)
HGB BLD-MCNC: 12 G/DL — LOW (ref 13–17)
HOROWITZ INDEX BLDV+IHG-RTO: 21 — SIGNIFICANT CHANGE UP
IMM GRANULOCYTES NFR BLD AUTO: 0.6 % — SIGNIFICANT CHANGE UP (ref 0–1.5)
LIDOCAIN IGE QN: 74 U/L — SIGNIFICANT CHANGE UP (ref 73–393)
LYMPHOCYTES # BLD AUTO: 1.03 K/UL — SIGNIFICANT CHANGE UP (ref 1–3.3)
LYMPHOCYTES # BLD AUTO: 9.5 % — LOW (ref 13–44)
MCHC RBC-ENTMCNC: 26.1 PG — LOW (ref 27–34)
MCHC RBC-ENTMCNC: 29.6 GM/DL — LOW (ref 32–36)
MCV RBC AUTO: 88.3 FL — SIGNIFICANT CHANGE UP (ref 80–100)
MONOCYTES # BLD AUTO: 0.91 K/UL — HIGH (ref 0–0.9)
MONOCYTES NFR BLD AUTO: 8.4 % — SIGNIFICANT CHANGE UP (ref 2–14)
NEUTROPHILS # BLD AUTO: 8.41 K/UL — HIGH (ref 1.8–7.4)
NEUTROPHILS NFR BLD AUTO: 77.5 % — HIGH (ref 43–77)
NRBC # BLD: 0 /100 WBCS — SIGNIFICANT CHANGE UP (ref 0–0)
PCO2 BLDV: 64 MMHG — HIGH (ref 35–50)
PH BLDV: 7.33 — LOW (ref 7.35–7.45)
PLATELET # BLD AUTO: 358 K/UL — SIGNIFICANT CHANGE UP (ref 150–400)
PO2 BLDV: 106 MMHG — HIGH (ref 25–45)
POTASSIUM SERPL-MCNC: 4.5 MMOL/L — SIGNIFICANT CHANGE UP (ref 3.5–5.3)
POTASSIUM SERPL-SCNC: 4.5 MMOL/L — SIGNIFICANT CHANGE UP (ref 3.5–5.3)
PROCALCITONIN SERPL-MCNC: <0.05 — SIGNIFICANT CHANGE UP (ref 0–0.04)
PROT SERPL-MCNC: 6.8 G/DL — SIGNIFICANT CHANGE UP (ref 6–8.3)
RBC # BLD: 4.6 M/UL — SIGNIFICANT CHANGE UP (ref 4.2–5.8)
RBC # FLD: 14 % — SIGNIFICANT CHANGE UP (ref 10.3–14.5)
SAO2 % BLDV: 98 % — HIGH (ref 67–88)
SODIUM SERPL-SCNC: 140 MMOL/L — SIGNIFICANT CHANGE UP (ref 135–145)
TROPONIN I SERPL-MCNC: <.015 NG/ML — SIGNIFICANT CHANGE UP (ref 0.01–0.04)
WBC # BLD: 10.84 K/UL — HIGH (ref 3.8–10.5)
WBC # FLD AUTO: 10.84 K/UL — HIGH (ref 3.8–10.5)

## 2019-12-05 PROCEDURE — 99284 EMERGENCY DEPT VISIT MOD MDM: CPT | Mod: 25

## 2019-12-05 PROCEDURE — 93005 ELECTROCARDIOGRAM TRACING: CPT

## 2019-12-05 PROCEDURE — 99284 EMERGENCY DEPT VISIT MOD MDM: CPT

## 2019-12-05 PROCEDURE — 83690 ASSAY OF LIPASE: CPT

## 2019-12-05 PROCEDURE — 84484 ASSAY OF TROPONIN QUANT: CPT

## 2019-12-05 PROCEDURE — 82803 BLOOD GASES ANY COMBINATION: CPT

## 2019-12-05 PROCEDURE — 84145 PROCALCITONIN (PCT): CPT

## 2019-12-05 PROCEDURE — 80053 COMPREHEN METABOLIC PANEL: CPT

## 2019-12-05 PROCEDURE — 36415 COLL VENOUS BLD VENIPUNCTURE: CPT

## 2019-12-05 PROCEDURE — 94640 AIRWAY INHALATION TREATMENT: CPT

## 2019-12-05 PROCEDURE — 85027 COMPLETE CBC AUTOMATED: CPT

## 2019-12-05 PROCEDURE — 96374 THER/PROPH/DIAG INJ IV PUSH: CPT

## 2019-12-05 PROCEDURE — 71045 X-RAY EXAM CHEST 1 VIEW: CPT | Mod: 26

## 2019-12-05 PROCEDURE — 71045 X-RAY EXAM CHEST 1 VIEW: CPT

## 2019-12-05 PROCEDURE — 93010 ELECTROCARDIOGRAM REPORT: CPT

## 2019-12-05 RX ORDER — SODIUM CHLORIDE 9 MG/ML
3 INJECTION INTRAMUSCULAR; INTRAVENOUS; SUBCUTANEOUS ONCE
Refills: 0 | Status: COMPLETED | OUTPATIENT
Start: 2019-12-05 | End: 2019-12-05

## 2019-12-05 RX ORDER — ALBUTEROL 90 UG/1
2.5 AEROSOL, METERED ORAL ONCE
Refills: 0 | Status: COMPLETED | OUTPATIENT
Start: 2019-12-05 | End: 2019-12-05

## 2019-12-05 RX ORDER — AZITHROMYCIN 500 MG/1
1 TABLET, FILM COATED ORAL
Qty: 3 | Refills: 0
Start: 2019-12-05 | End: 2019-12-07

## 2019-12-05 RX ADMIN — Medication 125 MILLIGRAM(S): at 16:36

## 2019-12-05 RX ADMIN — SODIUM CHLORIDE 3 MILLILITER(S): 9 INJECTION INTRAMUSCULAR; INTRAVENOUS; SUBCUTANEOUS at 13:19

## 2019-12-05 RX ADMIN — ALBUTEROL 2.5 MILLIGRAM(S): 90 AEROSOL, METERED ORAL at 14:55

## 2019-12-05 NOTE — ED PROVIDER NOTE - PROGRESS NOTE DETAILS
spoke with Dr. Dejesus regarding case will come to see the pt pt up and ambulatory no longer c/o respiratory distress and on home oxygen at 2 liters.  Francis by Dr. Dejesus in agreement with discharge home requests pt be sent home on Prednisone and Zithromax.  Wife in agreement with discharge

## 2019-12-05 NOTE — ED PROVIDER NOTE - ATTENDING CONTRIBUTION TO CARE
Pt is a 78 yo male who presents to the ED with a cc of SOB.  PMHx of COPD on home oxygen 2 liters NC and BIPAP at night, CAD s/p stent most recent 8/2019, CABG x 3, HLD, DM, h/o hypercapnic resp failure resulting in cardiac arrest requiring intubation 2018, h/o osteomyelitis.      8At 10:00 this morning, pt used Duoneb and albuterol w/o relief. Increased NC to 4L w/o significant relief and EMS was called. Received albuterol on way to ED. Pt reported relief. No recent URI. C/o rhinorrhea. Denies fevers, chills, orthopnea, LE swelling. Currently satting 100% on 2.5L NC.

## 2019-12-05 NOTE — CONSULT NOTE ADULT - SUBJECTIVE AND OBJECTIVE BOX
YUE MARISSAALAN    [facility] ED    Patient is a 79y old  Male who presents with a chief complaint of      Allergies    No Known Allergies    Intolerances    shellfish (Nausea)      HPI:      PAST MEDICAL & SURGICAL HISTORY:  PVD (peripheral vascular disease)  Hypertension  COPD (chronic obstructive pulmonary disease): on 2L at home and BiPAP at night; intubated   Osteomyelitis  Dyslipidemia  CAD (Coronary Artery Disease): s/p 3v CABG ; stents placed in [facility] in   Diabetes Mellitus, Type II  S/P primary angioplasty with coronary stent  Compound fracture: left leg  CABG (Coronary Artery Bypass Graft):       FAMILY HISTORY:  Family hx of lung cancer: brother,  age 82, used to smoke with pt  Family history of diabetes mellitus (Sibling)        MEDICATIONS   methylPREDNISolone sodium succinate Injectable 125 milliGRAM(s) IV Push Once      Vital Signs Last 24 Hrs  T(C): 36.5 (05 Dec 2019 12:08), Max: 36.5 (05 Dec 2019 12:08)  T(F): 97.7 (05 Dec 2019 12:08), Max: 97.7 (05 Dec 2019 12:08)  HR: 75 (05 Dec 2019 14:55) (75 - 94)  BP: 126/79 (05 Dec 2019 12:08) (126/79 - 126/79)  BP(mean): --  RR: 20 (05 Dec 2019 12:08) (20 - 20)  SpO2: 100% (05 Dec 2019 14:55) (100% - 100%)            LABS:                        12.0   10.84 )-----------( 358      ( 05 Dec 2019 13:16 )             40.6         140  |  104  |  10  ----------------------------<  197<H>  4.5   |  32<H>  |  1.10    Ca    9.0      05 Dec 2019 13:16    TPro  6.8  /  Alb  3.6  /  TBili  0.4  /  DBili  x   /  AST  35  /  ALT  42  /  AlkPhos  74                WBC:  WBC Count: 10.84 K/uL ( @ 13:16)      MICROBIOLOGY:  RECENT CULTURES:        CARDIAC MARKERS ( 05 Dec 2019 13:16 )  <.015 ng/mL / x     / x     / x     / x                Sodium:  Sodium, Serum: 140 mmol/L ( @ 13:16)      1.10 mg/dL  @ :16      Hemoglobin:  Hemoglobin: 12.0 g/dL ( @ 13:16)      Platelets: Platelet Count - Automated: 358 K/uL ( @ 13:16)      LIVER FUNCTIONS - ( 05 Dec 2019 13:16 )  Alb: 3.6 g/dL / Pro: 6.8 g/dL / ALK PHOS: 74 U/L / ALT: 42 U/L / AST: 35 U/L / GGT: x                 RADIOLOGY & ADDITIONAL STUDIES:

## 2019-12-05 NOTE — ED PROVIDER NOTE - CARE PROVIDER_API CALL
Arun Dejesus)  Critical Care Medicine; Internal Medicine; Pulmonary Disease  [facility] [place] [address]  Phone: [phone]  Fax: [phone]  Follow Up Time:

## 2019-12-05 NOTE — ED PROVIDER NOTE - PATIENT PORTAL LINK FT
You can access the FollowMyHealth Patient Portal offered by [facility] by registering at the following website: [email or web address]. By joining [facility] [employer]’s FollowMyHealth portal, you will also be able to view your health information using other applications (apps) compatible with our system.

## 2019-12-05 NOTE — ED ADULT NURSE NOTE - OBJECTIVE STATEMENT
BIBEMS. Present to ER with c/o of shortness of breath. Pt states he was given albuterol prior to arrival and verbalized relief. Denies any chest pain.

## 2019-12-05 NOTE — CONSULT NOTE ADULT - ASSESSMENT
cont rx cont rx    [facility] P [id number]  1940 ALEXX 2019  DR DSOUZA  ALLERGY Shellfish  CONTACT Sp [facility] C      Initial evaluation/Pulmonary Critical Care consultation requested on  2019  by Dr Dsouza  from Dr Dejesus   Patient examined chart reviewed    HOSPITAL ADMISSION   PATIENT CAME  FROM (if information available)      REVIEW OF SYMPTOMS      Able to give ROS  Yes     RELIABLE No   CONSTITUTIONAL Weakness Yes  Chills No Vision changes No  ENDOCRINE No unexplained hair loss No heat or cold intolerance    ALLERGY No hives  Sore throat No   RESP Coughing blood no  Shortness of breath YES   NEURO No Headache  Confusion Pain neck No   CARDIAC No Chest pain No Palpitations   GI No Pain abdomen NO   Vomiting NO     PHYSICAL EXAM    HEENT Unremarkable PERRLA atraumatic   RESP Fair air entry EXP prolonged    Harsh breath sound Resp distres mild   CARDIAC S1 S2 No S3     NO JVD    ABDOMEN SOFT BS PRESENT NOT DISTENDED No hepatosplenomegaly PEDAL EDEMA present No calf tenderness  NO rash   GENERAL Not TOXIC looking    VITALS/LABS       2019 afeb 94 120/70   2019 W 10.8 Hb 12 Plt 358 Na 140 K 4.5 CO2 32 Cr 1.1   2019 pc n   2019 Tr 1 n   2019 vbg pH 733   2019 [id number]  1940 ALEXX 2019  DR DSOUZA  ALLERGY [employer]  CONTACT Sp [facility]       PT DATA/BEST PRACTICE  ADVANCED DIRECTIVE       CODE STATUS: [ ] full code  [ ] DNR  [ ] DNI  [ ] MOLST  Goals of care discussion: [ ] yes   WT     95            BMI     29                                                                                                      HEAD OF BED ELEVATION Yes    PATIENT DESCRIPTION        CC           Dt  2019       sob                        ER VS     Dt   2019    120/70 94 20                 ER MX   Dt                                                 HPI         Dt  2019 79 m former smoker PMH HTN, DM2 (on home Metformin and glipizide), COPD (2L home/ BIPAP at night), CAD with 3v CABG 2004, Stent 2019 HLD, 10/26/2018 ECHO ef 55% hx hypercapnic resp failure with ho intubation c/b with cardiac arrest (2018) with ho recurrent admissions GOLD D presented to er 2019 with resp distress and Pulm consulted to see if I agree with ER MD that pt likely can be dced for outpt followup Pt feels better and wants to go home He has home O2 Home trilogy neb                        PMH        as above                                                     PULMONARY/CRITICAL CARE ASSESSMENT/RECOMMENDATIONS                    COPD ex   Given the fact that his vbg pH is 733 he likely does not have decompensated chr resp acidosis during this exacerbation His trop is neg and is subjectively feeling better after er treatment   I agree that he may be dced home on steroids and zpak and see me in office within a week If he gets worse he knows to come back to er Dw Dr Dsouza                TIME SPENT Over 55 minutes aggregate care time spent on encounter; activities included   direct pt care, counseling and/or coordinating care reviewing notes, lab data/ imaging , discussion with multidisciplinary team/ pt /family. Risks, benefits, alternatives  discussed in detail.

## 2019-12-05 NOTE — ED ADULT NURSE NOTE - NSIMPLEMENTINTERV_GEN_ALL_ED
Implemented All Universal Safety Interventions:  [place] to call system. Call bell, personal items and telephone within reach. Instruct patient to call for assistance. Room bathroom lighting operational. Non-slip footwear when patient is off stretcher. Physically safe environment: no spills, clutter or unnecessary equipment. Stretcher in lowest position, wheels locked, appropriate side rails in place.

## 2019-12-05 NOTE — ED PROVIDER NOTE - OBJECTIVE STATEMENT
80 y/o male with pmhx COPD on 2L NC and BiPAP at home, triple CABG w/ 1 cardiac stent placed 3 wks ago, 1 LLE stent placed 6 weeks ago, DMII on metformin, glybuzide, and insulin presents w/ 2 days of worsening SOB and non-productive cough. At 10:00 this morning, pt used Duoneb and albuterol w/o relief. Increased NC to 4L w/o significant relief and EMS was called. Received albuterol on way to ED. Pt reported relief. No recent URI. C/o rhinorrhea. Denies fevers, chills, orthopnea, LE swelling. Currently satting 100% on 2.5L NC.

## 2019-12-05 NOTE — ED PROVIDER NOTE - NSFOLLOWUPINSTRUCTIONS_ED_ALL_ED_FT
Return to the ED for any new or worsening symptoms  Take your medication as prescribed  Prednisone 1 tab daily   Zithromax per label instructions   Continue your BIPAP as prescribed  Continue to use your home oxygen   Continue your home nebulizer treatments  Follow up with Dr. Dejesus in 2-3 days for a recheck   Advance activity as tolerated

## 2019-12-13 ENCOUNTER — APPOINTMENT (OUTPATIENT)
Dept: PULMONOLOGY | Facility: CLINIC | Age: 79
End: 2019-12-13

## 2019-12-16 ENCOUNTER — APPOINTMENT (OUTPATIENT)
Dept: PULMONOLOGY | Facility: CLINIC | Age: 79
End: 2019-12-16
Payer: MEDICARE

## 2019-12-16 ENCOUNTER — LABORATORY RESULT (OUTPATIENT)
Age: 79
End: 2019-12-16

## 2019-12-16 VITALS
TEMPERATURE: 97.8 F | SYSTOLIC BLOOD PRESSURE: 116 MMHG | DIASTOLIC BLOOD PRESSURE: 73 MMHG | HEART RATE: 91 BPM | BODY MASS INDEX: 29.78 KG/M2 | RESPIRATION RATE: 17 BRPM | WEIGHT: 208 LBS | OXYGEN SATURATION: 96 % | HEIGHT: 70.08 IN

## 2019-12-16 DIAGNOSIS — Z83.3 FAMILY HISTORY OF DIABETES MELLITUS: ICD-10-CM

## 2019-12-16 DIAGNOSIS — Z80.1 FAMILY HISTORY OF MALIGNANT NEOPLASM OF TRACHEA, BRONCHUS AND LUNG: ICD-10-CM

## 2019-12-16 PROCEDURE — 94729 DIFFUSING CAPACITY: CPT

## 2019-12-16 PROCEDURE — 99215 OFFICE O/P EST HI 40 MIN: CPT | Mod: 25

## 2019-12-16 PROCEDURE — 94726 PLETHYSMOGRAPHY LUNG VOLUMES: CPT

## 2019-12-16 PROCEDURE — 94060 EVALUATION OF WHEEZING: CPT

## 2019-12-16 PROCEDURE — ZZZZZ: CPT

## 2019-12-16 PROCEDURE — 99205 OFFICE O/P NEW HI 60 MIN: CPT | Mod: 25

## 2019-12-16 PROCEDURE — 99215 OFFICE O/P EST HI 40 MIN: CPT

## 2019-12-16 RX ORDER — VALSARTAN AND HYDROCHLOROTHIAZIDE 160; 12.5 MG/1; MG/1
160-12.5 TABLET, FILM COATED ORAL
Qty: 90 | Refills: 0 | Status: DISCONTINUED | COMMUNITY
Start: 2017-07-06 | End: 2019-12-16

## 2019-12-16 RX ORDER — METOPROLOL TARTRATE 5 MG/5ML
INJECTION, SOLUTION INTRAVENOUS
Refills: 0 | Status: DISCONTINUED | COMMUNITY
End: 2019-12-16

## 2019-12-16 RX ORDER — TAMSULOSIN HYDROCHLORIDE 0.4 MG/1
CAPSULE ORAL
Refills: 0 | Status: DISCONTINUED | COMMUNITY
End: 2019-12-16

## 2019-12-16 RX ORDER — METFORMIN HYDROCHLORIDE 625 MG/1
TABLET ORAL
Refills: 0 | Status: DISCONTINUED | COMMUNITY
End: 2019-12-16

## 2019-12-16 RX ORDER — ROFLUMILAST 500 UG/1
500 TABLET ORAL DAILY
Qty: 30 | Refills: 5 | Status: DISCONTINUED | COMMUNITY
End: 2019-12-16

## 2019-12-16 RX ORDER — UMECLIDINIUM 62.5 UG/1
62.5 AEROSOL, POWDER ORAL
Refills: 0 | Status: DISCONTINUED | COMMUNITY
End: 2019-12-16

## 2019-12-16 RX ORDER — IPRATROPIUM BROMIDE 0.5 MG/2.5ML
0.02 SOLUTION RESPIRATORY (INHALATION) 4 TIMES DAILY
Qty: 2 | Refills: 2 | Status: DISCONTINUED | COMMUNITY
Start: 2019-08-29 | End: 2019-12-16

## 2019-12-16 RX ORDER — ASCORBIC ACID 500 MG
TABLET ORAL
Refills: 0 | Status: ACTIVE | COMMUNITY

## 2019-12-16 RX ORDER — FLUTICASONE FUROATE AND VILANTEROL TRIFENATATE 100; 25 UG/1; UG/1
100-25 POWDER RESPIRATORY (INHALATION)
Refills: 0 | Status: DISCONTINUED | COMMUNITY
End: 2019-12-16

## 2019-12-16 RX ORDER — CLOPIDOGREL 75 MG/1
75 TABLET, FILM COATED ORAL
Refills: 0 | Status: DISCONTINUED | COMMUNITY
End: 2019-12-16

## 2019-12-16 NOTE — CONSULT LETTER
[Dear  ___] : Dear  [unfilled], [Consult Letter:] : I had the pleasure of evaluating your patient, [unfilled]. [Please see my note below.] : Please see my note below. [Consult Closing:] : Thank you very much for allowing me to participate in the care of this patient.  If you have any questions, please do not hesitate to contact me. [Sincerely,] : Sincerely, [DrWatson  ___] : Dr. JIMENEZ [DrWatson ___] : Dr. JIMENEZ [FreeTextEntry3] : Oscar Marcelino MD\par Attending Physician in Pulmonary & Critical Care Medicine\par  of Medicine\par [employer] [facility] [FreeTextEntry2] : Dr. Sarah Avila MD\par [facility]\par [address]\[place] [address]\par Office: [phone]\par Fax: [phone]

## 2019-12-16 NOTE — HISTORY OF PRESENT ILLNESS
[FreeTextEntry1] : Mr. Donohue is an 79-year-old male with a history of Asthma-COPD Overlap Syndrome, former smoker, chronic hypoxemic and hypercapnic respiratory failure on home oxygen & nocturnal BiPAP, history of cardiac arrest due to respiratory failure in 2018, HTN, HLD, DM2 (not on insulin), CAD s/p 3V-CABG (2004) and PCI (Oct 2019), PVD s/p bilateral LE stents (most recently Nov 2019) on plavix, BPH, and left femur fracture with OM s/p multiple surgeries who presents for evaluation of his chronic lung disease. He follows with Dr. Arun Dejesus, but is being evaluated today for possible biologic therapy.\par \par He has had multiple hospitalizations, ICU stays, and ED visits for exacerbation of his chronic lung disease. He has been noted to have significant peripheral eosinophilia. Last IgE in February 2019 was 116. He is adherent with Breo Ellipta 200-25 mcg daily (rinses after use), Incruse Ellipta 62.5 mcg daily, Daliresp 500 mg daily, and Ventolin prn. He wears his BiPAP (IPAP 18 EPAP 8) every night from 10pm until 7am. He recently completed a 5-day course of prednisone 50 mg daily on Dec 10th, now he is taking prednisone 10 mg daily. He is up-to-date with influenza and pneumococcal vaccinations. He wears 2-3 liters of oxygen with exertion. Last night, he was having difficulty with the mask, so he tried a different one, but this was leaking and sliding off. So he will return to old mask. He reports stable dyspnea at present, but has significant dyspnea with minimal exertion. Denies any significant cough, but has sinus congestion and rhinorrhea. No chest pain, but has occasional wheezing.\par \par PFTs today with very severe obstruction with bronchodilator response (NOT reversible). TLC is normal, but RV is increased to 206%. Diffusion capacity is severely reduced.\par \par Last CXR in December 2019 with clear lungs, evidence of hyperinflation, no pneumonia, pneumothorax, or pleural effusion.\par \par Last CT chest October 2019 with centrilobular emphysema, scattered areas of linear scarring in \par both lung bases, lingula and RML, and stable punctate 1 mm calcified nodule anteriorly in RUL. There are areas of mucus impaction of the distal airways to the lower lobes. No pleural effusion. No lymphadenopathy. No pulmonary embolism.\par \par Last 2D-echo in March 2019 with diastolic dysfunction, no significant valvular disease, estimated RVSP of 29, which is normal\par \par Regarding his smoking history, he quit smoking in the 1980s, used to smoke 1 ppd for 30 years. Was smoking marijuana about 3-4 joints 3x/week until 2017. No alcohol use. No other drug use

## 2019-12-16 NOTE — ASSESSMENT
[FreeTextEntry1] : Mr. Donohue is an 79-year-old male with a history of Asthma-COPD Overlap Syndrome, chronic hypoxemic and hypercapnic respiratory failure on home oxygen & nocturnal BiPAP, history of cardiac arrest due to respiratory failure in 2018, former smoker, HTN, HLD, DM2 (not on insulin), CAD s/p 3V-CABG (2004) and PCI (Oct 2019), PVD s/p bilateral LE stents (most recently L iliac, SFA, & popliteal stents Nov 2019) on plavix, BPH, and left femur fracture with OM s/p multiple surgeries who presents for evaluation of his chronic lung disease. He has exacerbations requiring ED visit, hospitalization, or ICU stay at least 1-2 times/month despite maximal therapy.\par \par 1. Severe COPD (GOLD 4D):\par - PFTs today with severe obstruction with bronchodilator response consistent with bronchial hyperreactivity, normal TLC but markedly elevated RV (206%, 4.83 liters) consistent with air trapping. There is severely reduced diffusing capacity\par - Given bronchial hyperreactivity and peripheral eosinophilia, this may be Asthma-COPD Overlap Syndrome\par - Check CBC with diff, IgE, serum allergen profile, alpha-1 antitrypsin, and HIV (note: patient currently on 10 mg prednisone chronically, so may not have eosinophilia)\par - Continue with Breo Ellipta 200-25 mcg once daily, rinse after use\par - Continue Incruse Ellipta 62.5 mcg daily\par - Ventolin prn, spacer provided\par - Continue prednisone at 10 mg daily. Check A1C (last 7.2 in Oct 2019), will eventually need bone density scan\par - Given that he does not have chronic bronchitis, I am not sure if Roflumilast is of benefit\par - Start Montelukast 10 mg at bedtime given peripheral eosinophilia\par - Will try to obtain approval for Mepolizumab or Benralizumab as he is steroid-dependence with peripheral eosinophilia. Subsequently, may be able to taper prednisone\par - Planning to start Pulmonary Rehab with [facility] soon\par - Will review case with interventional pulmonary regarding possible benefit of bronchoscopic lung volume reduction given severe air trapping\par \par 2. Chronic hypoxemia and hypercapnic respiratory failure:\par - Likely due to COPD\par - Continue supplemental oxygen with nasal cannula 2-3 LPM with exercise\par - BiPAP every night, IPAP 18, EPAP 8\par - CXR (Dec 2019) with clear lungs, evidence of hyperinflation, no pneumonia, pneumothorax, or pleural effusion\par - CTPA (Oct 2019) with centrilobular emphysema, scattered areas of linear scarring in both lung bases, lingula and RML, and stable punctate 1 mm calcified nodule anteriorly in RUL. There are areas of mucus impaction of the distal airways to the lower lobes. No pleural effusion. No lymphadenopathy. No pulmonary embolism.\par - 2D-echo (March 2019) with diastolic dysfunction, no significant valvular disease, and normal RVSP\par - Obtain 6-minute walk test and exercise oximetry\par - Room air ABG to assess severity of V/Q mismatch\par - Split Diagnostic PSG with mandatory transcutaneous CO2 monitoring and BiPAP titration\par \par 3. Upper airway cough syndrome:\par - Start fluticasone nasal spray, 1-2 sprays per nostril twice daily\par \par 4. HCM:\par - Up-to-date with influenza and pneumococcal vaccinations\par - Follow-up in 1 month or sooner prn

## 2019-12-16 NOTE — REVIEW OF SYSTEMS
[Fatigue] : fatigue [Cough] : cough [Dyspnea] : dyspnea [Hypertension] : ~T hypertension [Wheezing] : wheezing [Diabetes] : diabetes mellitus [Negative] : Sleep Disorder [Fever] : no fever [Chills] : no chills [FreeTextEntry2] : rhinorrhea

## 2019-12-16 NOTE — PHYSICAL EXAM
[General Appearance - Well Developed] : well developed [Normal Appearance] : normal appearance [Well Groomed] : well groomed [General Appearance - Well Nourished] : well nourished [General Appearance - In No Acute Distress] : no acute distress [Normal Conjunctiva] : the conjunctiva exhibited no abnormalities [Neck Appearance] : the appearance of the neck was normal [Neck Cervical Mass (___cm)] : no neck mass was observed [Jugular Venous Distention Increased] : there was no jugular-venous distention [Heart Rate And Rhythm] : heart rate and rhythm were normal [Heart Sounds] : normal S1 and S2 [Murmurs] : no murmurs present [Arterial Pulses Normal] : the arterial pulses were normal [Edema] : no peripheral edema present [Abdomen Soft] : soft [Abdomen Tenderness] : non-tender [Abnormal Walk] : normal gait [Nail Clubbing] : no clubbing of the fingernails [Cyanosis, Localized] : no localized cyanosis [Skin Color & Pigmentation] : normal skin color and pigmentation [] : no rash [Cranial Nerves] : cranial nerves 2-12 were intact [Skin Lesions] : no skin lesions [Motor Exam] : the motor exam was normal [Oriented To Time, Place, And Person] : oriented to person, place, and time [Affect] : the affect was normal [Mood] : the mood was normal [FreeTextEntry1] : +tachypnea with walking with development of end-expiratory wheezing; no rales

## 2019-12-26 ENCOUNTER — MOBILE ON CALL (OUTPATIENT)
Age: 79
End: 2019-12-26

## 2020-01-02 NOTE — ED ADULT NURSE NOTE - NSSUSCREENINGQ5_ED_ALL_ED
68 y/o F with hx of HTN, colitis present with continuous bleeding from tongue x 5 days. She reports brushing her tongue very hard prior to the onset of symptoms. She states that she has been spitting up clots of blood. Patient is taking daily aspirin but has discontinue ASA 2 weeks prior to the insident. She was evaluated by ENT, Jerardo Mendiola MD of [facility], who performed laryngoscopy r/o any laryngeal sources of bleeding. patient denies HA, dizziness, lightheadedness, palpitations.
No

## 2020-01-03 ENCOUNTER — APPOINTMENT (OUTPATIENT)
Dept: PULMONOLOGY | Facility: CLINIC | Age: 80
End: 2020-01-03
Payer: MEDICARE

## 2020-01-03 PROCEDURE — ZZZZZ: CPT

## 2020-01-03 PROCEDURE — 94618 PULMONARY STRESS TESTING: CPT

## 2020-01-03 PROCEDURE — 82803 BLOOD GASES ANY COMBINATION: CPT

## 2020-01-03 PROCEDURE — 36600 WITHDRAWAL OF ARTERIAL BLOOD: CPT | Mod: 59

## 2020-01-05 LAB
A ALTERNATA IGE QN: <0.1 KUA/L
A FUMIGATUS IGE QN: 0.11 KUA/L
A1AT SERPL-MCNC: 153 MG/DL
BASOPHILS # BLD AUTO: 0.03 K/UL
BASOPHILS NFR BLD AUTO: 0.3 %
BERMUDA GRASS IGE QN: <0.1 KUA/L
BOXELDER IGE QN: <0.1 KUA/L
C HERBARUM IGE QN: <0.1 KUA/L
CALIF WALNUT IGE QN: <0.1 KUA/L
CAT DANDER IGE QN: <0.1 KUA/L
CMN PIGWEED IGE QN: <0.1 KUA/L
COMMON RAGWEED IGE QN: <0.1 KUA/L
COTTONWOOD IGE QN: <0.1 KUA/L
D FARINAE IGE QN: 1.41 KUA/L
D PTERONYSS IGE QN: 1.43 KUA/L
DEPRECATED A ALTERNATA IGE RAST QL: 0
DEPRECATED A FUMIGATUS IGE RAST QL: NORMAL
DEPRECATED BERMUDA GRASS IGE RAST QL: 0
DEPRECATED BOXELDER IGE RAST QL: 0
DEPRECATED C HERBARUM IGE RAST QL: 0
DEPRECATED CAT DANDER IGE RAST QL: 0
DEPRECATED COMMON PIGWEED IGE RAST QL: 0
DEPRECATED COMMON RAGWEED IGE RAST QL: 0
DEPRECATED COTTONWOOD IGE RAST QL: 0
DEPRECATED D FARINAE IGE RAST QL: 2
DEPRECATED D PTERONYSS IGE RAST QL: 2
DEPRECATED DOG DANDER IGE RAST QL: 0
DEPRECATED GOOSEFOOT IGE RAST QL: 0
DEPRECATED LONDON PLANE IGE RAST QL: 0
DEPRECATED MUGWORT IGE RAST QL: 0
DEPRECATED P NOTATUM IGE RAST QL: 0
DEPRECATED RED CEDAR IGE RAST QL: 0
DEPRECATED ROACH IGE RAST QL: 0
DEPRECATED SHEEP SORREL IGE RAST QL: 0
DEPRECATED SILVER BIRCH IGE RAST QL: 0
DEPRECATED TIMOTHY IGE RAST QL: NORMAL
DEPRECATED WHITE ASH IGE RAST QL: 0
DEPRECATED WHITE OAK IGE RAST QL: 0
DOG DANDER IGE QN: <0.1 KUA/L
EOSINOPHIL # BLD AUTO: 0.18 K/UL
EOSINOPHIL NFR BLD AUTO: 1.9 %
ESTIMATED AVERAGE GLUCOSE: 177 MG/DL
GOOSEFOOT IGE QN: <0.1 KUA/L
HBA1C MFR BLD HPLC: 7.8 %
HCT VFR BLD CALC: 43 %
HGB BLD-MCNC: 12.5 G/DL
HIV1+2 AB SPEC QL IA.RAPID: NONREACTIVE
IMM GRANULOCYTES NFR BLD AUTO: 0.6 %
LONDON PLANE IGE QN: <0.1 KUA/L
LYMPHOCYTES # BLD AUTO: 0.93 K/UL
LYMPHOCYTES NFR BLD AUTO: 9.8 %
MAN DIFF?: NORMAL
MCHC RBC-ENTMCNC: 25.9 PG
MCHC RBC-ENTMCNC: 29.1 GM/DL
MCV RBC AUTO: 89 FL
MONOCYTES # BLD AUTO: 0.43 K/UL
MONOCYTES NFR BLD AUTO: 4.6 %
MUGWORT IGE QN: <0.1 KUA/L
MULBERRY (T70) CLASS: 0
MULBERRY (T70) CONC: <0.1 KUA/L
NEUTROPHILS # BLD AUTO: 7.82 K/UL
NEUTROPHILS NFR BLD AUTO: 82.8 %
P NOTATUM IGE QN: <0.1 KUA/L
PLATELET # BLD AUTO: 289 K/UL
RBC # BLD: 4.83 M/UL
RBC # FLD: 13.5 %
RED CEDAR IGE QN: <0.1 KUA/L
ROACH IGE QN: <0.1 KUA/L
SHEEP SORREL IGE QN: <0.1 KUA/L
SILVER BIRCH IGE QN: <0.1 KUA/L
TIMOTHY IGE QN: 0.17 KUA/L
TOTAL IGE SMQN RAST: 118 KU/L
TREE ALLERG MIX1 IGE QL: 0
WBC # FLD AUTO: 9.45 K/UL
WHITE ASH IGE QN: <0.1 KUA/L
WHITE ELM IGE QN: 0
WHITE ELM IGE QN: <0.1 KUA/L
WHITE OAK IGE QN: <0.1 KUA/L

## 2020-01-16 ENCOUNTER — APPOINTMENT (OUTPATIENT)
Dept: PULMONOLOGY | Facility: CLINIC | Age: 80
End: 2020-01-16
Payer: MEDICARE

## 2020-01-16 VITALS
WEIGHT: 218 LBS | HEIGHT: 70 IN | RESPIRATION RATE: 20 BRPM | DIASTOLIC BLOOD PRESSURE: 82 MMHG | SYSTOLIC BLOOD PRESSURE: 149 MMHG | HEART RATE: 118 BPM | TEMPERATURE: 97 F | BODY MASS INDEX: 31.21 KG/M2

## 2020-01-16 VITALS — HEART RATE: 92 BPM

## 2020-01-16 PROCEDURE — 99214 OFFICE O/P EST MOD 30 MIN: CPT

## 2020-01-16 RX ORDER — ALBUTEROL SULFATE 90 UG/1
108 (90 BASE) AEROSOL, METERED RESPIRATORY (INHALATION)
Qty: 18 | Refills: 0 | Status: DISCONTINUED | COMMUNITY
Start: 2017-07-12 | End: 2020-01-16

## 2020-01-16 NOTE — PHYSICAL EXAM
[Normal Appearance] : normal appearance [General Appearance - Well Developed] : well developed [Well Groomed] : well groomed [General Appearance - Well Nourished] : well nourished [General Appearance - In No Acute Distress] : no acute distress [Normal Conjunctiva] : the conjunctiva exhibited no abnormalities [Neck Appearance] : the appearance of the neck was normal [Neck Cervical Mass (___cm)] : no neck mass was observed [Jugular Venous Distention Increased] : there was no jugular-venous distention [Heart Rate And Rhythm] : heart rate and rhythm were normal [Heart Sounds] : normal S1 and S2 [Arterial Pulses Normal] : the arterial pulses were normal [Murmurs] : no murmurs present [Abdomen Tenderness] : non-tender [Abdomen Soft] : soft [Nail Clubbing] : no clubbing of the fingernails [Abnormal Walk] : normal gait [Cranial Nerves] : cranial nerves 2-12 were intact [Cyanosis, Localized] : no localized cyanosis [Motor Exam] : the motor exam was normal [Oriented To Time, Place, And Person] : oriented to person, place, and time [Affect] : the affect was normal [Mood] : the mood was normal [Skin Color & Pigmentation] : normal skin color and pigmentation [Skin Lesions] : no skin lesions [Normal Oropharynx] : normal oropharynx [] : no respiratory distress [Exaggerated Use Of Accessory Muscles For Inspiration] : no accessory muscle use [Respiration, Rhythm And Depth] : normal respiratory rhythm and effort [FreeTextEntry1] : decreased air entry bilaterally; no wheezing auscultated

## 2020-01-16 NOTE — REVIEW OF SYSTEMS
[Fatigue] : fatigue [Cough] : cough [Dyspnea] : dyspnea [Wheezing] : wheezing [Diabetes] : diabetes mellitus [Hypertension] : ~T hypertension [Negative] : Sleep Disorder [Fever] : no fever [FreeTextEntry2] : rhinorrhea [Chills] : no chills

## 2020-01-16 NOTE — HISTORY OF PRESENT ILLNESS
[FreeTextEntry1] : Mr. Donohue is an 79-year-old male with a history of Asthma-COPD Overlap Syndrome, former smoker, chronic hypoxemic and hypercapnic respiratory failure on home oxygen & nocturnal BiPAP, history of cardiac arrest due to respiratory failure in 2018, HTN, HLD, DM2 (not on insulin), CAD s/p 3V-CABG (2004) and PCI (Oct 2019), PVD s/p bilateral LE stents (most recently Nov 2019) on plavix, BPH, and left femur fracture with OM s/p multiple surgeries who presents for follow-up of his chronic lung disease. \par \par Wife reports that for the past 2-3 days he has had increased dyspnea and wife has had to give him increased BiPAP with supplemental oxygen 3 liters per minute. No fevers, chills, chest pain/tightness, or wheezing. Flonase has helped with his cough. Cough is non-productive and has decreased since last visit. He still takes the Ventolin inhaler about 2x/day. He is feeling better today.\par \par He has had multiple hospitalizations, ICU stays, and ED visits for exacerbation of his chronic lung disease. He has been noted to have significant peripheral eosinophilia. Last IgE in February 2019 was 116. He remains on prednisone 10 mg daily. He is adherent with Breo Ellipta 200-25 mcg daily (rinses after use), Incruse Ellipta 62.5 mcg daily, Daliresp 500 mg daily, and Ventolin prn. He was recently started on montelukast and reports adherence. He started pulmonary rehab at [place], [facility] Tues & Thurs. He wears his BiPAP (IPAP 18 EPAP 8) every night from 10pm until 7am, and wears supplemental oxygen with nasal cannula 2-3 LPM during the day. Still pending diagnostic PSG/BiPAP titration with mandatory transcutaneous CO2 monitoring, scheduled for February. He is up-to-date with influenza and pneumococcal vaccinations. He is adherent with fluticasone nasal spray. He is planning to start Benralizumab on January 27th.\par \par PFTs (Dec 2019) with very severe obstruction with bronchodilator response (NOT reversible). TLC is normal, but RV is increased to 206%. Diffusion capacity is severely reduced.\par \par ABG (Jan 2020) with compensated hypercapnia. He also has mild hypoxemia and A-a gradient of 13 (although this may be underestimated if not enough time was given off oxygen prior to ABG). \par \par Exercise oximetry (Jan 2020) with hypoxemia at rest to 86% requiring 2L NC. Oxygen requirement with exercise also 2 LPM, but he was only able to exercise for 4 minutes prior to needing to use the bathroom.\par \par 6MWD (Jan 2020) is 132 meters.\par \par Last CXR in December 2019 with clear lungs, evidence of hyperinflation, no pneumonia, pneumothorax, or pleural effusion.\par \par Last CT chest October 2019 with centrilobular emphysema, scattered areas of linear scarring in \par both lung bases, lingula and RML, and stable punctate 1 mm calcified nodule anteriorly in RUL. There are areas of mucus impaction of the distal airways to the lower lobes. No pleural effusion. No lymphadenopathy. No pulmonary embolism.\par \par Last 2D-echo in March 2019 with diastolic dysfunction, no significant valvular disease, estimated RVSP of 29, which is normal\par \par Regarding his smoking history, he quit smoking in the 1980s, used to smoke 1 ppd for 30 years. Was smoking marijuana about 3-4 joints 3x/week until 2017. No alcohol use. No other drug use

## 2020-01-16 NOTE — ASSESSMENT
[FreeTextEntry1] : Mr. Donohue is an 79-year-old male with a history of Asthma-COPD Overlap Syndrome, chronic hypoxemic and hypercapnic respiratory failure on home oxygen & nocturnal BiPAP, history of cardiac arrest due to respiratory failure in 2018, former smoker, HTN, HLD, DM2 (not on insulin), CAD s/p 3V-CABG (2004) and PCI (Oct 2019), PVD s/p bilateral LE stents (most recently L iliac, SFA, & popliteal stents Nov 2019) on plavix, BPH, and left femur fracture with OM s/p multiple surgeries who presents for follow-up of his chronic lung disease. He has exacerbations requiring ED visit, hospitalization, or ICU stay at least 1-2 times/month despite maximal therapy, although now more than 5 weeks without ED visit!\par \par 1. Severe COPD (GOLD 4D):\par - PFTs (Dec 2019) with severe obstruction with bronchodilator response consistent with bronchial hyperreactivity, normal TLC but markedly elevated RV (206%, 4.83 liters) consistent with air trapping. There is severely reduced diffusing capacity\par - Given bronchial hyperreactivity and peripheral eosinophilia, I suspect Asthma-COPD Overlap Syndrome\par - CBC with diff with absolute eos of 180, but he is on prednisone. IgE elevated to 118, and [place] allergen profile positive for house dust. Aspergillus IgE negative. Alpha-1 antitrypsin normal and HIV negative\par - Continue with Breo Ellipta 200-25 mcg once daily (rinse after use) + Incruse Ellipta 62.5 mcg daily\par - Continue montelukast 10 mg at bedtime\par - Continue prednisone 10 mg daily, will start to taper after Benralizumab is started. A1C is 7.8 Jan 2019\par - Ventolin prn with spacer (provided last visit)\par - Continue Roflumilast 500 mg daily at this time, although will discontinue soon if minimal sputum production\par - Plan to initiate Benralizumab therapy on January 27th given steroid-dependence with peripheral eosinophilia. \par - Continue Pulmonary Rehab at [facility]\par - Recommend daily regular exercise with bike at home at least 30 minutes twice daily\par - Breathing exercises recommended (pursed lips for maximal exhalation)\par - Low carbohydrate diet given hypercapnia. Limit bread, pasta, rice, snacks, etc. Maximize protein and vegetable intake\par - May benefit from bronchoscopic lung volume reduction given severe air trapping (%). Initially decision was to hold off given hypercapnia. However, most recent ABG with improved hypercapnia (PCO2 53). Will follow-up with interventional pulmonary. Referral provided to thoracic surgery to consider possible surgical lung volume reduction\par \par 2. Chronic hypoxemia and hypercapnic respiratory failure:\par - Likely due to COPD, although interestingly A-a gradient on ABG on room air was normal (I suspect that he did not have enough time off oxygen prior to ABG)\par - Continue supplemental oxygen with nasal cannula 2-3 LPM with exercise. Saturation currently is 92% RA at rest\par - BiPAP every night, IPAP 18, EPAP 8\par - Split Diagnostic PSG with mandatory transcutaneous CO2 monitoring and BiPAP titration scheduled for Feb\par - CXR (Dec 2019) with clear lungs, evidence of hyperinflation, no pneumonia, pneumothorax, or pleural effusion\par - CTPA (Oct 2019) with centrilobular emphysema, scattered areas of linear scarring in both lung bases, lingula and RML, and stable punctate 1 mm calcified nodule anteriorly in RUL. There are areas of mucus impaction of the distal airways to the lower lobes. No pleural effusion. No lymphadenopathy. No pulmonary embolism.\par - 2D-echo (March 2019) with diastolic dysfunction, no significant valvular disease, and normal RVSP\par - 6MWD is 132 meters (but he had to use the bathroom). Exercise oximetry required 2 LPM with exercise\par \par 3. Bilateral leg edema:\par - Unclear etiology, possible related to chronic steroids\par - Keep legs elevated, consider compressions stockings\par - He has a history of diastolic dysfunction\par - Consider diuresis with furosemide if edema persistent next follow-up\par \par 4. Upper airway cough syndrome:\par - Continue fluticasone nasal spray, 1-2 sprays per nostril twice daily\par \par 5. HCM:\par - Up-to-date with influenza and pneumococcal vaccinations\par - Follow-up in 2 weeks with first Benralizumab injection

## 2020-01-17 ENCOUNTER — INPATIENT (INPATIENT)
Facility: HOSPITAL | Age: 80
LOS: 1 days | Discharge: HOME | DRG: 189 | End: 2020-01-19
Attending: INTERNAL MEDICINE | Admitting: INTERNAL MEDICINE
Payer: COMMERCIAL

## 2020-01-17 VITALS
SYSTOLIC BLOOD PRESSURE: 138 MMHG | HEART RATE: 98 BPM | OXYGEN SATURATION: 100 % | WEIGHT: 214.95 LBS | TEMPERATURE: 98 F | RESPIRATION RATE: 20 BRPM | DIASTOLIC BLOOD PRESSURE: 86 MMHG | HEIGHT: 71 IN

## 2020-01-17 DIAGNOSIS — J44.9 CHRONIC OBSTRUCTIVE PULMONARY DISEASE, UNSPECIFIED: ICD-10-CM

## 2020-01-17 DIAGNOSIS — Z29.9 ENCOUNTER FOR PROPHYLACTIC MEASURES, UNSPECIFIED: ICD-10-CM

## 2020-01-17 DIAGNOSIS — I10 ESSENTIAL (PRIMARY) HYPERTENSION: ICD-10-CM

## 2020-01-17 DIAGNOSIS — I73.9 PERIPHERAL VASCULAR DISEASE, UNSPECIFIED: ICD-10-CM

## 2020-01-17 DIAGNOSIS — E11.9 TYPE 2 DIABETES MELLITUS WITHOUT COMPLICATIONS: ICD-10-CM

## 2020-01-17 DIAGNOSIS — E78.5 HYPERLIPIDEMIA, UNSPECIFIED: ICD-10-CM

## 2020-01-17 DIAGNOSIS — I25.10 ATHEROSCLEROTIC HEART DISEASE OF NATIVE CORONARY ARTERY WITHOUT ANGINA PECTORIS: ICD-10-CM

## 2020-01-17 DIAGNOSIS — J96.90 RESPIRATORY FAILURE, UNSPECIFIED, UNSPECIFIED WHETHER WITH HYPOXIA OR HYPERCAPNIA: ICD-10-CM

## 2020-01-17 DIAGNOSIS — Z95.5 PRESENCE OF CORONARY ANGIOPLASTY IMPLANT AND GRAFT: Chronic | ICD-10-CM

## 2020-01-17 DIAGNOSIS — T14.8XXA OTHER INJURY OF UNSPECIFIED BODY REGION, INITIAL ENCOUNTER: Chronic | ICD-10-CM

## 2020-01-17 LAB
ALBUMIN SERPL ELPH-MCNC: 3.5 G/DL — SIGNIFICANT CHANGE UP (ref 3.3–5)
ALP SERPL-CCNC: 101 U/L — SIGNIFICANT CHANGE UP (ref 40–120)
ALT FLD-CCNC: 46 U/L — SIGNIFICANT CHANGE UP (ref 12–78)
ANION GAP SERPL CALC-SCNC: 7 MMOL/L — SIGNIFICANT CHANGE UP (ref 5–17)
APTT BLD: 36.4 SEC — SIGNIFICANT CHANGE UP (ref 28.5–37)
AST SERPL-CCNC: 30 U/L — SIGNIFICANT CHANGE UP (ref 15–37)
BASE EXCESS BLDA CALC-SCNC: 5.6 MMOL/L — HIGH (ref -2–2)
BASOPHILS # BLD AUTO: 0.04 K/UL — SIGNIFICANT CHANGE UP (ref 0–0.2)
BASOPHILS NFR BLD AUTO: 0.5 % — SIGNIFICANT CHANGE UP (ref 0–2)
BILIRUB SERPL-MCNC: 0.3 MG/DL — SIGNIFICANT CHANGE UP (ref 0.2–1.2)
BLOOD GAS COMMENTS ARTERIAL: SIGNIFICANT CHANGE UP
BUN SERPL-MCNC: 12 MG/DL — SIGNIFICANT CHANGE UP (ref 7–23)
CALCIUM SERPL-MCNC: 8.7 MG/DL — SIGNIFICANT CHANGE UP (ref 8.5–10.1)
CHLORIDE SERPL-SCNC: 104 MMOL/L — SIGNIFICANT CHANGE UP (ref 96–108)
CO2 SERPL-SCNC: 32 MMOL/L — HIGH (ref 22–31)
CREAT SERPL-MCNC: 1.2 MG/DL — SIGNIFICANT CHANGE UP (ref 0.5–1.3)
EOSINOPHIL # BLD AUTO: 0.4 K/UL — SIGNIFICANT CHANGE UP (ref 0–0.5)
EOSINOPHIL NFR BLD AUTO: 5.4 % — SIGNIFICANT CHANGE UP (ref 0–6)
GLUCOSE SERPL-MCNC: 147 MG/DL — HIGH (ref 70–99)
HCO3 BLDA-SCNC: 29 MMOL/L — HIGH (ref 23–27)
HCT VFR BLD CALC: 39.6 % — SIGNIFICANT CHANGE UP (ref 39–50)
HGB BLD-MCNC: 11.6 G/DL — LOW (ref 13–17)
HOROWITZ INDEX BLDA+IHG-RTO: 37 — SIGNIFICANT CHANGE UP
IMM GRANULOCYTES NFR BLD AUTO: 0.7 % — SIGNIFICANT CHANGE UP (ref 0–1.5)
INR BLD: 0.92 RATIO — SIGNIFICANT CHANGE UP (ref 0.88–1.16)
LACTATE SERPL-SCNC: 1.9 MMOL/L — SIGNIFICANT CHANGE UP (ref 0.7–2)
LACTATE SERPL-SCNC: 3.4 MMOL/L — HIGH (ref 0.7–2)
LYMPHOCYTES # BLD AUTO: 1.91 K/UL — SIGNIFICANT CHANGE UP (ref 1–3.3)
LYMPHOCYTES # BLD AUTO: 25.7 % — SIGNIFICANT CHANGE UP (ref 13–44)
MCHC RBC-ENTMCNC: 26.6 PG — LOW (ref 27–34)
MCHC RBC-ENTMCNC: 29.3 GM/DL — LOW (ref 32–36)
MCV RBC AUTO: 90.8 FL — SIGNIFICANT CHANGE UP (ref 80–100)
MONOCYTES # BLD AUTO: 0.82 K/UL — SIGNIFICANT CHANGE UP (ref 0–0.9)
MONOCYTES NFR BLD AUTO: 11 % — SIGNIFICANT CHANGE UP (ref 2–14)
NEUTROPHILS # BLD AUTO: 4.22 K/UL — SIGNIFICANT CHANGE UP (ref 1.8–7.4)
NEUTROPHILS NFR BLD AUTO: 56.7 % — SIGNIFICANT CHANGE UP (ref 43–77)
NRBC # BLD: 0 /100 WBCS — SIGNIFICANT CHANGE UP (ref 0–0)
NT-PROBNP SERPL-SCNC: 98 PG/ML — SIGNIFICANT CHANGE UP (ref 0–450)
PCO2 BLDA: 58 MMHG — HIGH (ref 32–46)
PH BLDA: 7.35 — SIGNIFICANT CHANGE UP (ref 7.35–7.45)
PLATELET # BLD AUTO: 291 K/UL — SIGNIFICANT CHANGE UP (ref 150–400)
PO2 BLDA: 181 MMHG — HIGH (ref 74–108)
POTASSIUM SERPL-MCNC: 3.9 MMOL/L — SIGNIFICANT CHANGE UP (ref 3.5–5.3)
POTASSIUM SERPL-SCNC: 3.9 MMOL/L — SIGNIFICANT CHANGE UP (ref 3.5–5.3)
PROT SERPL-MCNC: 6.5 G/DL — SIGNIFICANT CHANGE UP (ref 6–8.3)
PROTHROM AB SERPL-ACNC: 10.4 SEC — SIGNIFICANT CHANGE UP (ref 10–12.9)
RBC # BLD: 4.36 M/UL — SIGNIFICANT CHANGE UP (ref 4.2–5.8)
RBC # FLD: 14.1 % — SIGNIFICANT CHANGE UP (ref 10.3–14.5)
SAO2 % BLDA: 100 % — HIGH (ref 92–96)
SODIUM SERPL-SCNC: 143 MMOL/L — SIGNIFICANT CHANGE UP (ref 135–145)
TROPONIN I SERPL-MCNC: 0.04 NG/ML — SIGNIFICANT CHANGE UP (ref 0.01–0.04)
TSH SERPL-MCNC: 0.55 UIU/ML — SIGNIFICANT CHANGE UP (ref 0.36–3.74)
WBC # BLD: 7.44 K/UL — SIGNIFICANT CHANGE UP (ref 3.8–10.5)
WBC # FLD AUTO: 7.44 K/UL — SIGNIFICANT CHANGE UP (ref 3.8–10.5)

## 2020-01-17 PROCEDURE — 93970 EXTREMITY STUDY: CPT | Mod: 26

## 2020-01-17 PROCEDURE — 99285 EMERGENCY DEPT VISIT HI MDM: CPT

## 2020-01-17 PROCEDURE — 99223 1ST HOSP IP/OBS HIGH 75: CPT

## 2020-01-17 PROCEDURE — 71045 X-RAY EXAM CHEST 1 VIEW: CPT | Mod: 26

## 2020-01-17 RX ORDER — PIPERACILLIN AND TAZOBACTAM 4; .5 G/20ML; G/20ML
3.38 INJECTION, POWDER, LYOPHILIZED, FOR SOLUTION INTRAVENOUS ONCE
Refills: 0 | Status: COMPLETED | OUTPATIENT
Start: 2020-01-17 | End: 2020-01-17

## 2020-01-17 RX ORDER — METOPROLOL TARTRATE 50 MG
12.5 TABLET ORAL
Refills: 0 | Status: DISCONTINUED | OUTPATIENT
Start: 2020-01-17 | End: 2020-01-19

## 2020-01-17 RX ORDER — PANTOPRAZOLE SODIUM 20 MG/1
40 TABLET, DELAYED RELEASE ORAL
Refills: 0 | Status: DISCONTINUED | OUTPATIENT
Start: 2020-01-17 | End: 2020-01-19

## 2020-01-17 RX ORDER — ATORVASTATIN CALCIUM 80 MG/1
40 TABLET, FILM COATED ORAL AT BEDTIME
Refills: 0 | Status: DISCONTINUED | OUTPATIENT
Start: 2020-01-17 | End: 2020-01-19

## 2020-01-17 RX ORDER — IPRATROPIUM/ALBUTEROL SULFATE 18-103MCG
3 AEROSOL WITH ADAPTER (GRAM) INHALATION
Refills: 0 | Status: COMPLETED | OUTPATIENT
Start: 2020-01-17 | End: 2020-01-17

## 2020-01-17 RX ORDER — INSULIN LISPRO 100/ML
VIAL (ML) SUBCUTANEOUS
Refills: 0 | Status: DISCONTINUED | OUTPATIENT
Start: 2020-01-17 | End: 2020-01-19

## 2020-01-17 RX ORDER — HEPARIN SODIUM 5000 [USP'U]/ML
5000 INJECTION INTRAVENOUS; SUBCUTANEOUS EVERY 12 HOURS
Refills: 0 | Status: DISCONTINUED | OUTPATIENT
Start: 2020-01-17 | End: 2020-01-19

## 2020-01-17 RX ORDER — SODIUM CHLORIDE 9 MG/ML
1000 INJECTION INTRAMUSCULAR; INTRAVENOUS; SUBCUTANEOUS ONCE
Refills: 0 | Status: COMPLETED | OUTPATIENT
Start: 2020-01-17 | End: 2020-01-17

## 2020-01-17 RX ORDER — VANCOMYCIN HCL 1 G
1000 VIAL (EA) INTRAVENOUS ONCE
Refills: 0 | Status: COMPLETED | OUTPATIENT
Start: 2020-01-17 | End: 2020-01-17

## 2020-01-17 RX ORDER — DEXTROSE 50 % IN WATER 50 %
15 SYRINGE (ML) INTRAVENOUS ONCE
Refills: 0 | Status: DISCONTINUED | OUTPATIENT
Start: 2020-01-17 | End: 2020-01-19

## 2020-01-17 RX ORDER — INSULIN LISPRO 100/ML
VIAL (ML) SUBCUTANEOUS AT BEDTIME
Refills: 0 | Status: DISCONTINUED | OUTPATIENT
Start: 2020-01-17 | End: 2020-01-19

## 2020-01-17 RX ORDER — LANOLIN ALCOHOL/MO/W.PET/CERES
5 CREAM (GRAM) TOPICAL AT BEDTIME
Refills: 0 | Status: DISCONTINUED | OUTPATIENT
Start: 2020-01-17 | End: 2020-01-19

## 2020-01-17 RX ORDER — ASCORBIC ACID 60 MG
500 TABLET,CHEWABLE ORAL DAILY
Refills: 0 | Status: DISCONTINUED | OUTPATIENT
Start: 2020-01-17 | End: 2020-01-19

## 2020-01-17 RX ORDER — ASPIRIN/CALCIUM CARB/MAGNESIUM 324 MG
81 TABLET ORAL DAILY
Refills: 0 | Status: DISCONTINUED | OUTPATIENT
Start: 2020-01-17 | End: 2020-01-19

## 2020-01-17 RX ORDER — DEXTROSE 50 % IN WATER 50 %
25 SYRINGE (ML) INTRAVENOUS ONCE
Refills: 0 | Status: DISCONTINUED | OUTPATIENT
Start: 2020-01-17 | End: 2020-01-19

## 2020-01-17 RX ORDER — MONTELUKAST 4 MG/1
10 TABLET, CHEWABLE ORAL AT BEDTIME
Refills: 0 | Status: DISCONTINUED | OUTPATIENT
Start: 2020-01-17 | End: 2020-01-19

## 2020-01-17 RX ORDER — DEXTROSE 50 % IN WATER 50 %
12.5 SYRINGE (ML) INTRAVENOUS ONCE
Refills: 0 | Status: DISCONTINUED | OUTPATIENT
Start: 2020-01-17 | End: 2020-01-19

## 2020-01-17 RX ORDER — SODIUM CHLORIDE 9 MG/ML
1000 INJECTION, SOLUTION INTRAVENOUS
Refills: 0 | Status: DISCONTINUED | OUTPATIENT
Start: 2020-01-17 | End: 2020-01-19

## 2020-01-17 RX ORDER — ACETAMINOPHEN 500 MG
650 TABLET ORAL EVERY 6 HOURS
Refills: 0 | Status: DISCONTINUED | OUTPATIENT
Start: 2020-01-17 | End: 2020-01-19

## 2020-01-17 RX ORDER — IPRATROPIUM/ALBUTEROL SULFATE 18-103MCG
3 AEROSOL WITH ADAPTER (GRAM) INHALATION EVERY 6 HOURS
Refills: 0 | Status: DISCONTINUED | OUTPATIENT
Start: 2020-01-17 | End: 2020-01-19

## 2020-01-17 RX ORDER — GLUCAGON INJECTION, SOLUTION 0.5 MG/.1ML
1 INJECTION, SOLUTION SUBCUTANEOUS ONCE
Refills: 0 | Status: DISCONTINUED | OUTPATIENT
Start: 2020-01-17 | End: 2020-01-19

## 2020-01-17 RX ORDER — TAMSULOSIN HYDROCHLORIDE 0.4 MG/1
0.4 CAPSULE ORAL AT BEDTIME
Refills: 0 | Status: DISCONTINUED | OUTPATIENT
Start: 2020-01-17 | End: 2020-01-19

## 2020-01-17 RX ORDER — BUDESONIDE AND FORMOTEROL FUMARATE DIHYDRATE 160; 4.5 UG/1; UG/1
2 AEROSOL RESPIRATORY (INHALATION)
Refills: 0 | Status: DISCONTINUED | OUTPATIENT
Start: 2020-01-17 | End: 2020-01-19

## 2020-01-17 RX ORDER — CLOPIDOGREL BISULFATE 75 MG/1
75 TABLET, FILM COATED ORAL DAILY
Refills: 0 | Status: DISCONTINUED | OUTPATIENT
Start: 2020-01-17 | End: 2020-01-19

## 2020-01-17 RX ADMIN — SODIUM CHLORIDE 1000 MILLILITER(S): 9 INJECTION INTRAMUSCULAR; INTRAVENOUS; SUBCUTANEOUS at 18:41

## 2020-01-17 RX ADMIN — Medication 125 MILLIGRAM(S): at 13:00

## 2020-01-17 RX ADMIN — TAMSULOSIN HYDROCHLORIDE 0.4 MILLIGRAM(S): 0.4 CAPSULE ORAL at 22:17

## 2020-01-17 RX ADMIN — Medication 3 MILLILITER(S): at 20:49

## 2020-01-17 RX ADMIN — Medication 250 MILLIGRAM(S): at 16:54

## 2020-01-17 RX ADMIN — Medication 3 MILLILITER(S): at 13:00

## 2020-01-17 RX ADMIN — HEPARIN SODIUM 5000 UNIT(S): 5000 INJECTION INTRAVENOUS; SUBCUTANEOUS at 18:11

## 2020-01-17 RX ADMIN — MONTELUKAST 10 MILLIGRAM(S): 4 TABLET, CHEWABLE ORAL at 22:17

## 2020-01-17 RX ADMIN — Medication 12.5 MILLIGRAM(S): at 18:11

## 2020-01-17 RX ADMIN — Medication 3 MILLILITER(S): at 13:02

## 2020-01-17 RX ADMIN — SODIUM CHLORIDE 1000 MILLILITER(S): 9 INJECTION INTRAMUSCULAR; INTRAVENOUS; SUBCUTANEOUS at 15:00

## 2020-01-17 RX ADMIN — Medication 1: at 22:42

## 2020-01-17 RX ADMIN — PIPERACILLIN AND TAZOBACTAM 200 GRAM(S): 4; .5 INJECTION, POWDER, LYOPHILIZED, FOR SOLUTION INTRAVENOUS at 15:45

## 2020-01-17 RX ADMIN — ATORVASTATIN CALCIUM 40 MILLIGRAM(S): 80 TABLET, FILM COATED ORAL at 22:17

## 2020-01-17 RX ADMIN — Medication 3 MILLILITER(S): at 13:21

## 2020-01-17 NOTE — H&P ADULT - ASSESSMENT
80 yo AAM with history of  COPD (On home O2 2L and BIPAP at while sleeping regardless of time of day), CAD (w/ 3v CABG 2004),s/p 2 recent coronary stensts in [facility] , HLD, DM2 (on Metformin), BPH, hx Hypercapnic Respiratory  Failure/Cardiac Arrest requiring intubation (6/18) ,frequent admissions in 2019, BIBEMS for sudden onset of pressure like chest pain which started at 930 am no radiation with worsening sob with exertion. Wife took bp which was normal but pulse was 160. Wife gave 3 asa to pt and by the time EMS arrived HR went to 100. Pt has been baseline otherwise no fever chills nvd abdominal pain no recent travels. Pt states chest pain resolved on arrival to ER . As per ems requiring increase in O2 to 4 L nc. Denies smoking/drinking/drug use  Diagnosed with COPD EXACERBATION AND ACUTE  HYPERCAPNIC AND HYPOXIC RESPIRATORY FAILURE ,placed on BIPAP , Seen by pulmonologist Dr Dejesus and cardiologist Dr Heath Admitted  to telemetry unit for monitoring , send 3 sets of cardiac ensymes to rule out coronary event, obtain ECHO to evaluate LVEF, cardiology consult ,continue current management, O2 supply, anticoagulation plan as per cardiology consult -continue asa ,plavix and heparin dvt prophylaxis dose

## 2020-01-17 NOTE — CONSULT NOTE ADULT - ASSESSMENT
80 yo male with PMH of PVD s/p recent lle stent, HTN, COPD (on BiPAP and 2L at home when moving), dyslipidemia, CAD (s/p CABG x3 and PCI at [place] in 2010), here with chest pain. His pulse was reported to be in the 160-170 range. During last admission, he presented with an SVT in the setting of hypercarbic respiratory failure, which converted to SR with adenosine.    - he is currently in a SR, though presumably had svt earlier this morning  - admit to telemetry and watch  - cont metoprolol as tolerated by his blood pressure    - He is short of breath now, though had chest pain earlier.  - his chest pain was likely present in the setting of his fast heart rates. No clear evidence of acute ischemia. Troponin is negative though would repeat another set in 6 hours.  - Continue Asa, plavix and statin for recent le and cardiac stents  - would start him on bipap for increased work of breathing  - Rx for COPD and pulmonary evaluation.  - no sign of significant overloaded. His CXR is clear and his BNP is minimal.  - PE is also on the differential in the setting of his hypoxia and tachycardia. He does have some le edema on exam, so would check le dopplers  - Last echocardiogram in our record in 3/2019 with normal lv function. Can repeat if no improvement in his breathing.    - monitor and replete lytes, keep K>4, Mg>2  - Other cardiovascular workup will depend on clinical course.  - All other workup per primary team  - Will follow with you 80 yo male with PMH of PVD s/p recent lle stent, HTN, COPD (on BiPAP and 2L at home when moving), dyslipidemia, CAD (s/p CABG x3 and PCI at [place] in 2019), here with chest pain. His pulse was reported to be in the 160-170 range. During last admission, he presented with an SVT in the setting of hypercarbic respiratory failure, which converted to SR with adenosine.    - he is currently in a SR, though presumably had svt earlier this morning  - admit to telemetry and watch  - cont metoprolol as tolerated by his blood pressure    - He is short of breath now, though had chest pain earlier.  - his chest pain was likely present in the setting of his fast heart rates. No clear evidence of acute ischemia. Troponin is negative though would repeat another set in 6 hours.  - Continue Asa, plavix and statin for recent le and cardiac stents  - would start him on bipap for increased work of breathing  - Rx for COPD and pulmonary evaluation.  - no sign of significant overloaded. His CXR is clear and his BNP is minimal.  - PE is also on the differential in the setting of his hypoxia and tachycardia. He does have some le edema on exam, so would check le dopplers  - Last echocardiogram in our record in 3/2019 with normal lv function. Can repeat if no improvement in his breathing.    - monitor and replete lytes, keep K>4, Mg>2  - Other cardiovascular workup will depend on clinical course.  - All other workup per primary team  - Will follow with you

## 2020-01-17 NOTE — H&P ADULT - NEGATIVE NEUROLOGICAL SYMPTOMS
no generalized seizures/no transient paralysis/no weakness/no paresthesias/no focal seizures/no syncope

## 2020-01-17 NOTE — ED PROVIDER NOTE - SKIN, MLM
Walk in
Skin normal color for race, warm, dry and intact. No evidence of rash. mild swelling +1 pitting edema

## 2020-01-17 NOTE — H&P ADULT - HISTORY OF PRESENT ILLNESS
80 yo AAM with history of  COPD (On home O2 2L and BIPAP at while sleeping regardless of time of day), CAD (w/ 3v CABG 2004),s/p 2 recent coronary stensts in [facility] , HLD, DM2 (on Metformin), BPH, hx Hypercapnic Respiratory  Failure/Cardiac Arrest requiring intubation (6/18) ,frequent admissions in 2019, BIBEMS for sudden onset of pressure like chest pain which started at 930 am no radiation with worsening sob with exertion. Wife took bp which was normal but pulse was 160. Wife gave 3 asa to pt and by the time EMS arrived HR went to 100. Pt has been baseline otherwise no fever chills nvd abdominal pain no recent travels. Pt states chest pain resolved on arrival to ER . As per ems requiring increase in O2 to 4 L nc. Denies smoking/drinking/drug use  Diagnosed with COPD EXACERBATION AND ACUTE  HYPERCAPNIC AND HYPOXIC RESPIRATORY FAILURE ,placed on BIPAP , Seen by pulmonologist Dr Dejesus and cardiologist Dr Heath Admitted  to telemetry unit for monitoring , send 3 sets of cardiac ensymes to rule out coronary event, obtain ECHO to evaluate LVEF, cardiology consult ,continue current management, O2 supply, anticoagulation plan as per cardiology consult -continue asa ,plavix and heparin dvt prophylaxis dose

## 2020-01-17 NOTE — ED ADULT NURSE NOTE - PAIN: PRESENCE, MLM
Anesthesia ROS/Med Hx    Overall Review:  Pts. EKG was reviewed and Pts.echo was reviewed   Pt. has no history of anesthetic complications  Pt. does not have difficult airway    Pulmonary Review:    Pt. positive for sleep apnea - CPAP    Cardiovascular Review:    Exercise tolerance: good  Pt. positive for hypertension  Overall Review of Systems Comments:  Mild ascending aortic aneurysm .       Anesthesia Plan     ASA Status: 2  Anesthesia Type: General  Induction: Intravenous  Preferred Airway Type: ETT  Reviewed: Lab Results, Beta Blocker Status, Medications, Problem List, Past Med History, NPO Status, Allergies, Patient Summary, EKG and Pre-Induction Reassessment  The proposed anesthetic plan, including its risks and benefits, have been discussed with the Patient - along with the risks and benefits of alternatives.  Questions were encouraged and answered and the patient and/or representative agrees to proceed.  Plan Comments: Discussed MAC anesthesia with patient.    Questions welcomed and answered.         Physical Exam  Mallampati: II  TM Distance: >3 FB  Neck ROM: Full  cardiovascular exam normal  pulmonary exam normal      Legend: C=Chipped  M=Missing  L=Loose                 denies pain/discomfort

## 2020-01-17 NOTE — ED ADULT NURSE NOTE - OBJECTIVE STATEMENT
Patient stable condition able to make needs known. Placed on cardiac monitor. Comfortable appearance. MSpencer  Patient refuses to have body examined for decubitus ulcers. Respiratory called for patient to go on Bipap. MSpencer

## 2020-01-17 NOTE — H&P ADULT - ATTENDING COMMENTS
78 yo AAM with history of  COPD (On home O2 2L and BIPAP at while sleeping regardless of time of day), CAD (w/ 3v CABG 2004),s/p 2 recent coronary stensts in [facility] , HLD, DM2 (on Metformin), BPH, hx Hypercapnic Respiratory  Failure/Cardiac Arrest requiring intubation (6/18) ,frequent admissions in 2019, BIBEMS for sudden onset of pressure like chest pain which started at 930 am no radiation with worsening sob with exertion. Wife took bp which was normal but pulse was 160. Wife gave 3 asa to pt and by the time EMS arrived HR went to 100. Pt has been baseline otherwise no fever chills nvd abdominal pain no recent travels. Pt states chest pain resolved on arrival to ER . As per ems requiring increase in O2 to 4 L nc. Denies smoking/drinking/drug use  Diagnosed with COPD EXACERBATION AND ACUTE  HYPERCAPNIC AND HYPOXIC RESPIRATORY FAILURE ,placed on BIPAP , Seen by pulmonologist Dr Dejesus and cardiologist Dr Heath Admitted  to telemetry unit for monitoring , send 3 sets of cardiac ensymes to rule out coronary event, obtain ECHO to evaluate LVEF, cardiology consult ,continue current management, O2 supply, anticoagulation plan as per cardiology consult -continue asa ,plavix and heparin dvt prophylaxis dose

## 2020-01-17 NOTE — ED PROVIDER NOTE - ATTENDING CONTRIBUTION TO CARE
78 y/o M from home with increased SOB, palpitations.  pt was given ASA by wife.  As per EMS HR NSR 100s on arrival.  In ED pt denies complaints.  Increased O2 requirement, septic workup, nebs, steroids.    PE: ill appearing, mild resp distress, HR tachy, lung: clear    abg, Dr. eDjesus consulted, Kumar Downey consulted.  admit

## 2020-01-17 NOTE — CONSULT NOTE ADULT - SUBJECTIVE AND OBJECTIVE BOX
[facility] - Saud Aguilar, Dani, Génesis, Medina, Kumar Hodges  Office Number: [phone]    Initial Consult Note    CHIEF COMPLAINT: Patient is a 79y old  Male who presents with a chief complaint of chest pain    HPI:  79 male with pmhx of COPD on 2L NC and BiPAP at home, triple CABG w/ 1 cardiac stent, 1 LLE stent, DMII on metformin, glybuzide, and insulin BIBEMS for sudden onset of pressure like chest pain which started at 930 am no radiation with worsening sob with exertion. Wife took bp which was normal but pulse was 160. Wife gave 3 asa to pt and by the time EMS arrived HR went to 100. Pt has been baseline otherwise no fever chills nvd abdominal pain no recent travels. Pt states chest pain resolved now. As per ems requiring increase in O2 to 4 L nc.     He has been feeling well until this morning. His wife reports that his BP had been running a little high yesterday, though better today.  She reports that his HR on the pulse ox was 160-170. After this, he felt pain in the middle of the chest, and EMS was called.  His breathing has not been a problem per wife.  During his last admission for hypercarbic failure in 10/2019, he initially presented with an svt, that eventually converted to SR with treatment of his breathing issues.    PAST MEDICAL & SURGICAL HISTORY:  PVD (peripheral vascular disease)  Hypertension  COPD (chronic obstructive pulmonary disease): on 2L at home and BiPAP at night; intubated   Osteomyelitis  Dyslipidemia  CAD (Coronary Artery Disease): s/p 3v CABG ; stents placed in [place] in   Diabetes Mellitus, Type II  S/P primary angioplasty with coronary stent  Compound fracture: left leg  CABG (Coronary Artery Bypass Graft):       SOCIAL HISTORY:  No tobacco, ethanol, or drug abuse.    FAMILY HISTORY:  Family hx of lung cancer: brother,  age 82, used to smoke with pt  Family history of diabetes mellitus (Sibling)    No family history of acute MI or sudden cardiac death.    MEDICATIONS  (STANDING):    MEDICATIONS  (PRN):      Allergies    No Known Allergies    Intolerances    shellfish (Nausea)      REVIEW OF SYSTEMS:    CONSTITUTIONAL: No weakness, fevers or chills  EYES/ENT: No visual changes;  No vertigo or throat pain   NECK: No pain or stiffness  RESPIRATORY: No cough, wheezing, hemoptysis; No shortness of breath  CARDIOVASCULAR: + chest pain and palpitations  GASTROINTESTINAL: No abdominal pain. No nausea, vomiting, or hematemesis; No diarrhea or constipation. No melena or hematochezia.  GENITOURINARY: No dysuria, frequency or hematuria  NEUROLOGICAL: No numbness or weakness  SKIN: No itching or rash  All other review of systems is negative unless indicated above    VITAL SIGNS:   Vital Signs Last 24 Hrs  T(C): 36.6 (2020 12:04), Max: 36.6 (2020 12:04)  T(F): 97.8 (2020 12:04), Max: 97.8 (2020 12:04)  HR: 87 (2020 13:23) (86 - 98)  BP: 138/86 (2020 12:04) (138/86 - 138/86)  BP(mean): --  RR: 20 (2020 12:04) (20 - 20)  SpO2: 100% (:23) (99% - 100%)    I&O's Summary      On Exam:    Constitutional: mild distress, awake/alert, obese,   Pulmonary:  +labored, decreased breath sounds bilaterally, No wheezing, rales or rhonchi  Cardiovascular: RRR.  S1 and S2 positive.  No murmurs, rubs, gallops or clicks  Gastrointestinal: Bowel Sounds present, soft, nontender.   Lymph: + peripheral edema at ankles. No cervical lymphadenopathy.  Neurological: Alert, no focal deficits  Skin: No rashes or ulcers   Psych:  Mood & affect appropriate.    LABS: All Labs Reviewed:                        11.6   7.44  )-----------( 291      ( 2020 13:47 )             39.6     2020 13:47    143    |  104    |  12     ----------------------------<  147    3.9     |  32     |  1.20     Ca    8.7        2020 13:47    TPro  6.5    /  Alb  3.5    /  TBili  0.3    /  DBili  x      /  AST  30     /  ALT  46     /  AlkPhos  101    2020 13:47    PT/INR - ( 2020 13:47 )   PT: 10.4 sec;   INR: 0.92 ratio         PTT - ( 2020 13:47 )  PTT:36.4 sec  CARDIAC MARKERS ( 2020 13:47 )  .045 ng/mL / x     / x     / x     / x          Blood Culture:    @ 13:47  Pro Bnp 98     @ 13:47  TSH: 0.55      RADIOLOGY:    EKG: sr

## 2020-01-17 NOTE — H&P ADULT - PROBLEM SELECTOR PLAN 2
Admit for antibacterial managmnet , steroids,nebulised bronchodilators and pulmonology evaluation,O2 supply,serial labs and chest xrays,obtain ECHO to evaluate LVEF and rule out chf component

## 2020-01-17 NOTE — ED PROVIDER NOTE - CLINICAL SUMMARY MEDICAL DECISION MAKING FREE TEXT BOX
Pt is a 78 yo male with chest pain sob will get ekg abg cxr labs cultures consult pulm and cardio give neb tx steroids   already took asa 325

## 2020-01-17 NOTE — CONSULT NOTE ADULT - ASSESSMENT
cont rx cont rx    [facility] P [phone]  1940 DOA 2020  DR ISREAL MAR  ALLERGY [employer]  [facility]              Initial evaluation/Pulmonary Critical Care consultation requested on  2020  by Dr Mar  from Dr Dejesus   Patient examined chart reviewed    HOSPITAL ADMISSION   PATIENT CAME  FROM (if information available)      [facility] P [phone]  1940 DOA 2020  DR ISREAL MAR  ALLERGY [employer]  CONTACT [facility] [employer] C                    REVIEW OF SYMPTOMS      Able to give ROS  Yes     RELIABLE No   CONSTITUTIONAL Weakness Yes  Chills No Vision changes No  ENDOCRINE No unexplained hair loss No heat or cold intolerance    ALLERGY No hives  Sore throat No   RESP Coughing blood no  Shortness of breath YES   NEURO No Headache  Confusion Pain neck No   CARDIAC No Chest pain No Palpitations   GI No Pain abdomen NO   Vomiting NO     PHYSICAL EXAM    HEENT Unremarkable PERRLA atraumatic   RESP Fair air entry EXP prolonged    Harsh breath sound Resp distres mild   CARDIAC S1 S2 No S3     NO JVD    ABDOMEN SOFT BS PRESENT NOT DISTENDED No hepatosplenomegaly PEDAL EDEMA present No calf tenderness  NO rash   GENERAL Not TOXIC looking    VITALS/LABS       2020 afeb 98 138/86   2020 W 7.4 Hb 11.6 Plt 291 INR .9 Na 143 K 3.9 CO2 32 Cr 1.2   2020 Tr 1 n   2020 bnp 98   2020 D-dimer 159   2020 LA 3.4       PT DATA/BEST PRACTICE  # ALLERGY shellfish   ADVANCED DIRECTIVE       Goals of care discussion  WT  97 (2020)    BMI  30 (2020)                                                                                                           HEAD OF BED ELEVATION Yes  DYSPHAGIA EVAL   # DIET  dash ()      # IV F                                    # DVT PROPHYLAXIS   hpsc ()        # ABIO                                   STRESS ULCER PROPHYLAXIS              protonix 40 )   INFECTION PPLX  ECHO                3/12/2019 ef 55% dd1 interatrial septum aneurysm  GLYCEMIC CONTROL    PT SUMMARY                                79 m former smoker PMH HTN, DM2 (on home Metformin and glipizide), COPD (2L home/ BIPAP at night), CAD with 3v CABG , Stent 2019 HLD, 10/26/2018 ECHO ef 55% hx hypercapnic resp failure with ho intubation c/b with cardiac arrest (2018) with ho recurrent admissions GOLD D recent admission  2019 and 10/2019 with copd ex ac on chr hypercapnic resp failure  was  admitted with 10/2019 SVT Rxed with adenosine ac hypercapnic resp failure ho pvd ho recent (2019 lle stent   Patient admitted 2020 with intermittent chest pain and  He had stopped using valsartan few d ago When he came in he did not have increased dyspnea but developed more sob in ER and was placed on BPAP    Pulm consulted 2020       PATIENT DATA ASSESSMENT PLAN                                  RESP GAS EXCHANGE   2020 1p 4l 735/58/181   A/R Use bpap as needed for incr wob and wean to noct bpap when he is better      RO RESP TRACT INFECTION   2020 CXr no infiltr   2020 W 7.4  A/R Check pc   LACTICEMIA   2020 LA 3.4   May be sec to wob  COPD ex   Former smoker On home O2 Home noct bpap   Montelukast 10 ()   duoneb () symbicort 160 () pred 10 ()   BD steroids   RO VTE   2020 D-dimer 159   2020 Check v duplex legs   CAD  2020 Tr 1 n   PMH CAD with 3v CABG , Stent 2019 HLD  ASA 81 () Plavix 75 ()  atorvastat 40 () metorpolol 12.5x2 ()   CHF   2020 bnp 98   10/26/2018 ECHO ef 55%  TACHARRHYTMIA   HO SVT 10/2019   metorpolol 12.5x2 ()   2020 Seen by Cardio To Rx with metoprolol        DISPOSITION/ PLAN    COPD ex BD steroids  TACHYARRHYTHMIA Metoprolol monitor       TIME SPENT Over 55 minutes aggregate care time spent on encounter; activities included   direct pt care, counseling and/or coordinating care reviewing notes, lab data/ imaging , discussion with multidisciplinary team/ pt /family. Risks, benefits, alternatives  discussed in detail.

## 2020-01-17 NOTE — ED PROVIDER NOTE - PROGRESS NOTE DETAILS
lactate 3.4 rest of labs wnl cxr wnl  given fluids covered with abx seen by Dr. wilson advised us r/o dvt dimer negative   seen by cardio Dr. Heath placed on bipap will admit to tele improved with bipap

## 2020-01-17 NOTE — H&P ADULT - NSHPLABSRESULTS_GEN_ALL_CORE
< from: Xray Chest 1 View AP/PA (01.17.20 @ 13:20) >    EXAM:  XR CHEST AP OR PA 1V                            PROCEDURE DATE:  01/17/2020          INTERPRETATION:  Short of breath.    AP chest. Prior 12/5/2019.    Status post median sternotomy. Normal heart mediastinum. Calcified aortic arch. Symmetrically hyperinflated emphysematous lungs. No consolidation pneumothorax or effusion.    Impression: Hyperinflated lungs. No active infiltrates. Stable exam    < end of copied text > < from: Xray Chest 1 View AP/PA (01.17.20 @ 13:20) >    EXAM:  XR CHEST AP OR PA 1V                            PROCEDURE DATE:  01/17/2020          INTERPRETATION:  Short of breath.    AP chest. Prior 12/5/2019.    Status post median sternotomy. Normal heart mediastinum. Calcified aortic arch. Symmetrically hyperinflated emphysematous lungs. No consolidation pneumothorax or effusion.    Impression: Hyperinflated lungs. No active infiltrates. Stable exam    < end of copied text >< from: TTE Echo Doppler w/o Cont (03.12.19 @ 20:42) >    FINDINGS  Left Ventricle: The endocardium is not well-visualized. In limited views,   the LV function appears to be preserved with an estimated EF of 55%.   There is paradoxical motion of the septum in the setting of prior cardiac   surgery.  Aortic Valve: Calcified aortic valve with normal opening. Trace aortic   regurgitation  Mitral Valve: Thickened and calcified mitral valve leaflets with trace to   mild mitral regurgitation  Tricuspid Valve: Grossly normal tricuspid valve. Mild tricuspid   regurgitation.  Pulmonic Valve: Not well-visualized.  Left Atrium: Grossly normal. An interatrial septal aneurysm is noted   without obvious color flow across it  Right Ventricle: In limited views, grossly normal RV size.  Right Atrium: Grossly normal  Diastolic Function: Doppler evidence of rate 1 diastolic dysfunction  Pericardium/Pleura: No significant pericardial effusion.  Hemodynamics: The pulmonary artery systolic pressure is estimated to be   29 mmHg, assuming the right atrial pressure is equal to 8 mmHg,   consistent with normal pulmonary pressures.    CONCLUSIONS:  1. Technically difficult and limited study  2. The endocardium is not well-visualized. In limited views, the LV   function appears to be preserved with an estimated EF of 55%. There is   paradoxical motion of the septum in the setting of prior cardiac surgery.  3. Calcified aortic valve with normal opening. Trace aortic regurgitation  4. Thickened and calcified mitral valve leaflets with trace to mild   mitral regurgitation  5. Doppler evidence of rate 1 diastolic dysfunction  6. The interatrial septum appears aneurysmal  7. No significant pericardial effusion.    < end of copied text >

## 2020-01-17 NOTE — ED PROVIDER NOTE - OBJECTIVE STATEMENT
Pt is a 79 male with pmhx of COPD on 2L NC and BiPAP at home, triple CABG w/ 1 cardiac stent, 1 LLE stent, DMII on metformin, glybuzide, and insulin BIBEMS for sudden onset of pressure like chest pain which started at 930 am no radiation with worsening sob with exertion. Wife took bp which was normal but pulse was 160. Wife gave 3 asa to pt and by the time EMS arrived HR went to 100. Pt has been baseline otherwise no fever chills nvd abdominal pain no recent travels. Pt states chest pain resolved now. As per ems requiring increase in O2 to 4 L nc.     pcp Austin wilson

## 2020-01-17 NOTE — CONSULT NOTE ADULT - SUBJECTIVE AND OBJECTIVE BOX
YUE C[facility]    [facility] ED    Patient is a 79y old  Male who presents with a chief complaint of      Allergies    No Known Allergies    Intolerances    shellfish (Nausea)      HPI:      PAST MEDICAL & SURGICAL HISTORY:  PVD (peripheral vascular disease)  Hypertension  COPD (chronic obstructive pulmonary disease): on 2L at home and BiPAP at night; intubated   Osteomyelitis  Dyslipidemia  CAD (Coronary Artery Disease): s/p 3v CABG ; stents placed in [place] in   Diabetes Mellitus, Type II  S/P primary angioplasty with coronary stent  Compound fracture: left leg  CABG (Coronary Artery Bypass Graft):       FAMILY HISTORY:  Family hx of lung cancer: brother,  age 82, used to smoke with pt  Family history of diabetes mellitus (Sibling)        MEDICATIONS   albuterol/ipratropium for Nebulization.. 3 milliLiter(s) Nebulizer every 20 minutes  methylPREDNISolone sodium succinate Injectable 125 milliGRAM(s) IV Push Once      Vital Signs Last 24 Hrs  T(C): 36.6 (2020 12:04), Max: 36.6 (2020 12:04)  T(F): 97.8 (2020 12:04), Max: 97.8 (2020 12:04)  HR: 98 (2020 12:04) (98 - 98)  BP: 138/86 (2020 12:04) (138/86 - 138/86)  BP(mean): --  RR: 20 (2020 12:04) (20 - 20)  SpO2: 100% (2020 12:04) (100% - 100%)            LABS:                    WBC:      MICROBIOLOGY:  RECENT CULTURES:                  Sodium:          Hemoglobin:      Platelets:             RADIOLOGY & ADDITIONAL STUDIES:

## 2020-01-17 NOTE — ED ADULT NURSE NOTE - CHIEF COMPLAINT QUOTE
pt brought in by ambulance for intermittent chest pain this morning  when EMS arrived, pt's heart rate was 160-170 but resolved, pt on 2L NC at home for COPD, c/o difficulty breathing, when EMS increased O2 to 4L, pt states breathing was easier

## 2020-01-17 NOTE — ED PROVIDER NOTE - CONSTITUTIONAL, MLM
normal... Well appearing, awake, alert, oriented to person, place, time/situation and in no apparent distress. on 4 L nc

## 2020-01-17 NOTE — H&P ADULT - RS GEN PE MLT RESP DETAILS PC
wheezes/diminished breath sounds, R/respirations labored/airway patent/breath sounds equal/diminished breath sounds, L

## 2020-01-17 NOTE — H&P ADULT - NSICDXFAMILYHX_GEN_ALL_CORE_FT
FAMILY HISTORY:  Family hx of lung cancer, brother,  age 82, used to smoke with pt    Sibling  Still living? Unknown  Family history of diabetes mellitus, Age at diagnosis: Age Unknown

## 2020-01-17 NOTE — H&P ADULT - PROBLEM SELECTOR PLAN 1
placed on BIPAP ,serial ABGS ,seen by pulmonologist Dr Dejesus ,chest xray to rule out pneumonia Admitted for septic workup and evaluation,send blood and urine cx,serial lactate levels,monitor vitals closley,ivfs hydration,monitor urine output and renal profile,iv abx initiated in ER

## 2020-01-18 LAB
ALBUMIN SERPL ELPH-MCNC: 3.2 G/DL — LOW (ref 3.3–5)
ALP SERPL-CCNC: 78 U/L — SIGNIFICANT CHANGE UP (ref 40–120)
ALT FLD-CCNC: 39 U/L — SIGNIFICANT CHANGE UP (ref 12–78)
ANION GAP SERPL CALC-SCNC: 6 MMOL/L — SIGNIFICANT CHANGE UP (ref 5–17)
AST SERPL-CCNC: 18 U/L — SIGNIFICANT CHANGE UP (ref 15–37)
BASOPHILS # BLD AUTO: 0.02 K/UL — SIGNIFICANT CHANGE UP (ref 0–0.2)
BASOPHILS NFR BLD AUTO: 0.2 % — SIGNIFICANT CHANGE UP (ref 0–2)
BILIRUB SERPL-MCNC: 0.4 MG/DL — SIGNIFICANT CHANGE UP (ref 0.2–1.2)
BUN SERPL-MCNC: 14 MG/DL — SIGNIFICANT CHANGE UP (ref 7–23)
CALCIUM SERPL-MCNC: 9 MG/DL — SIGNIFICANT CHANGE UP (ref 8.5–10.1)
CHLORIDE SERPL-SCNC: 104 MMOL/L — SIGNIFICANT CHANGE UP (ref 96–108)
CO2 SERPL-SCNC: 31 MMOL/L — SIGNIFICANT CHANGE UP (ref 22–31)
CREAT SERPL-MCNC: 1 MG/DL — SIGNIFICANT CHANGE UP (ref 0.5–1.3)
EOSINOPHIL # BLD AUTO: 0.02 K/UL — SIGNIFICANT CHANGE UP (ref 0–0.5)
EOSINOPHIL NFR BLD AUTO: 0.2 % — SIGNIFICANT CHANGE UP (ref 0–6)
GLUCOSE SERPL-MCNC: 182 MG/DL — HIGH (ref 70–99)
HBA1C BLD-MCNC: 6.9 % — HIGH (ref 4–5.6)
HCT VFR BLD CALC: 35.8 % — LOW (ref 39–50)
HGB BLD-MCNC: 10.8 G/DL — LOW (ref 13–17)
IMM GRANULOCYTES NFR BLD AUTO: 0.4 % — SIGNIFICANT CHANGE UP (ref 0–1.5)
LACTATE SERPL-SCNC: 1.5 MMOL/L — SIGNIFICANT CHANGE UP (ref 0.7–2)
LYMPHOCYTES # BLD AUTO: 0.6 K/UL — LOW (ref 1–3.3)
LYMPHOCYTES # BLD AUTO: 7.2 % — LOW (ref 13–44)
MCHC RBC-ENTMCNC: 26.3 PG — LOW (ref 27–34)
MCHC RBC-ENTMCNC: 30.2 GM/DL — LOW (ref 32–36)
MCV RBC AUTO: 87.3 FL — SIGNIFICANT CHANGE UP (ref 80–100)
MONOCYTES # BLD AUTO: 1.09 K/UL — HIGH (ref 0–0.9)
MONOCYTES NFR BLD AUTO: 13.1 % — SIGNIFICANT CHANGE UP (ref 2–14)
NEUTROPHILS # BLD AUTO: 6.57 K/UL — SIGNIFICANT CHANGE UP (ref 1.8–7.4)
NEUTROPHILS NFR BLD AUTO: 78.9 % — HIGH (ref 43–77)
NRBC # BLD: 0 /100 WBCS — SIGNIFICANT CHANGE UP (ref 0–0)
PHOSPHATE SERPL-MCNC: 2.9 MG/DL — SIGNIFICANT CHANGE UP (ref 2.5–4.5)
PLATELET # BLD AUTO: 286 K/UL — SIGNIFICANT CHANGE UP (ref 150–400)
POTASSIUM SERPL-MCNC: 4.6 MMOL/L — SIGNIFICANT CHANGE UP (ref 3.5–5.3)
POTASSIUM SERPL-SCNC: 4.6 MMOL/L — SIGNIFICANT CHANGE UP (ref 3.5–5.3)
PROCALCITONIN SERPL-MCNC: <0.05 — SIGNIFICANT CHANGE UP (ref 0–0.04)
PROT SERPL-MCNC: 6.1 G/DL — SIGNIFICANT CHANGE UP (ref 6–8.3)
RBC # BLD: 4.1 M/UL — LOW (ref 4.2–5.8)
RBC # FLD: 13.7 % — SIGNIFICANT CHANGE UP (ref 10.3–14.5)
SODIUM SERPL-SCNC: 141 MMOL/L — SIGNIFICANT CHANGE UP (ref 135–145)
TROPONIN I SERPL-MCNC: 0.07 NG/ML — HIGH (ref 0.01–0.04)
WBC # BLD: 8.33 K/UL — SIGNIFICANT CHANGE UP (ref 3.8–10.5)
WBC # FLD AUTO: 8.33 K/UL — SIGNIFICANT CHANGE UP (ref 3.8–10.5)

## 2020-01-18 PROCEDURE — 99232 SBSQ HOSP IP/OBS MODERATE 35: CPT

## 2020-01-18 RX ADMIN — Medication 1: at 08:01

## 2020-01-18 RX ADMIN — Medication 10 MILLIGRAM(S): at 04:47

## 2020-01-18 RX ADMIN — BUDESONIDE AND FORMOTEROL FUMARATE DIHYDRATE 2 PUFF(S): 160; 4.5 AEROSOL RESPIRATORY (INHALATION) at 17:06

## 2020-01-18 RX ADMIN — Medication 1: at 11:49

## 2020-01-18 RX ADMIN — Medication 500 MILLIGRAM(S): at 11:22

## 2020-01-18 RX ADMIN — Medication 81 MILLIGRAM(S): at 11:22

## 2020-01-18 RX ADMIN — ATORVASTATIN CALCIUM 40 MILLIGRAM(S): 80 TABLET, FILM COATED ORAL at 21:39

## 2020-01-18 RX ADMIN — Medication 12.5 MILLIGRAM(S): at 17:06

## 2020-01-18 RX ADMIN — Medication 3 MILLILITER(S): at 08:05

## 2020-01-18 RX ADMIN — Medication 12.5 MILLIGRAM(S): at 04:46

## 2020-01-18 RX ADMIN — Medication 3 MILLILITER(S): at 03:05

## 2020-01-18 RX ADMIN — PANTOPRAZOLE SODIUM 40 MILLIGRAM(S): 20 TABLET, DELAYED RELEASE ORAL at 04:46

## 2020-01-18 RX ADMIN — HEPARIN SODIUM 5000 UNIT(S): 5000 INJECTION INTRAVENOUS; SUBCUTANEOUS at 17:06

## 2020-01-18 RX ADMIN — Medication 3 MILLILITER(S): at 13:11

## 2020-01-18 RX ADMIN — HEPARIN SODIUM 5000 UNIT(S): 5000 INJECTION INTRAVENOUS; SUBCUTANEOUS at 04:47

## 2020-01-18 RX ADMIN — CLOPIDOGREL BISULFATE 75 MILLIGRAM(S): 75 TABLET, FILM COATED ORAL at 11:22

## 2020-01-18 RX ADMIN — Medication 1 TABLET(S): at 11:22

## 2020-01-18 RX ADMIN — Medication 3 MILLILITER(S): at 20:23

## 2020-01-18 RX ADMIN — BUDESONIDE AND FORMOTEROL FUMARATE DIHYDRATE 2 PUFF(S): 160; 4.5 AEROSOL RESPIRATORY (INHALATION) at 08:02

## 2020-01-18 RX ADMIN — TAMSULOSIN HYDROCHLORIDE 0.4 MILLIGRAM(S): 0.4 CAPSULE ORAL at 21:38

## 2020-01-18 RX ADMIN — MONTELUKAST 10 MILLIGRAM(S): 4 TABLET, CHEWABLE ORAL at 21:38

## 2020-01-18 NOTE — PROVIDER CONTACT NOTE (OTHER) - ASSESSMENT
Pt is AxOx4, no acute distress noted, denies chest pain/discomfort, pt resting in bed comfortably. Pt is asymptomatic.

## 2020-01-18 NOTE — PROGRESS NOTE ADULT - ASSESSMENT
80 yo AAM with history of  COPD (On home O2 2L and BIPAP at while sleeping regardless of time of day), CAD (w/ 3v CABG 2004),s/p 2 recent coronary stensts in [facility] , HLD, DM2 (on Metformin), BPH, hx Hypercapnic Respiratory  Failure/Cardiac Arrest requiring intubation (6/18) ,frequent admissions in 2019, BIBEMS for sudden onset of pressure like chest pain which started at 930 am no radiation with worsening sob with exertion. Wife took bp which was normal but pulse was 160. Wife gave 3 asa to pt and by the time EMS arrived HR went to 100. Pt has been baseline otherwise no fever chills nvd abdominal pain no recent travels. Pt states chest pain resolved on arrival to ER . As per ems requiring increase in O2 to 4 L nc. Denies smoking/drinking/drug use  Diagnosed with COPD EXACERBATION AND ACUTE  HYPERCAPNIC AND HYPOXIC RESPIRATORY FAILURE ,placed on BIPAP , Seen by pulmonologist Dr Dejesus and cardiologist Dr Heath Admitted  to telemetry unit for monitoring , send 3 sets of cardiac ensymes to rule out coronary event, obtain ECHO to evaluate LVEF, cardiology consult ,continue current management, O2 supply, anticoagulation plan as per cardiology consult -continue asa ,plavix and heparin dvt prophylaxis dose

## 2020-01-18 NOTE — PROGRESS NOTE ADULT - ASSESSMENT
80 yo male with PMH of PVD, HTN, COPD (on BiPAP and 2L at home), dyslipidemia, CAD (s/p CABG x3), and DM presenting with SVT and shortness of breath, received Adenosine x2.  SVT now resolved but still sinus tachy, shortness of breath in the setting of hypercarbia, now improved with bipap    CAD/CABG hx  -Telemetry revealing NSR no events overnight   -no acute ischemia  -Continue asa, Lipitor, Plavix  -Euvolemic on exam   -Continue beta blocker   -Echo 2019 as delineated above ef 55%, trace to mild MR, grade 1 diastolic dysfunction     COPD, COURTNEY  Bipap/ nebulizers as per pulmonary     HTN  -stable 130/80, continue beta blocker      HLD  - CW statin     Anemia  -Work up as per primary team     VTE ppx  - cw enoxaparin     - monitor and replete lytes, keep K>4, Mg>2  - Other cardiovascular workup will depend on clinical course.  Aziza Potrillo FNP-C  Cardiology NP  [facility] [phone] 78 yo male with PMH of PVD s/p recent lle stent, HTN, COPD (on BiPAP and 2L at home when moving), dyslipidemia, CAD (s/p CABG x3 and PCI at [place] in 2019), here with chest pain. His pulse was reported to be in the 160-170 range. During last admission, he presented with an SVT in the setting of hypercarbic respiratory failure, which converted to SR with adenosine.    - he is currently in a SR, though presumably had svt yesterday morning  - no events on telemetry overnight  - cont metoprolol as tolerated by his blood pressure    - His breathing is better this morning.  - his chest pain was likely present in the setting of his fast heart rates. No clear evidence of acute ischemia. Troponin is mildly positive, and would repeat later today.  - Continue Asa, plavix and statin for recent le and cardiac stents  - bipap as needed  - Rx for COPD and pulmonary evaluation.  - no sign of significant overloaded. His CXR is clear and his BNP is minimal  - Last echocardiogram in our record in 3/2019 with normal lv function. Can repeat if no improvement in his breathing.    - monitor and replete lytes, keep K>4, Mg>2  - Other cardiovascular workup will depend on clinical course.  - All other workup per primary team  - Will follow with you      Aziza LARA-C  Cardiology NP  [facility] [phone]

## 2020-01-18 NOTE — PROGRESS NOTE ADULT - SUBJECTIVE AND OBJECTIVE BOX
YUE     \[facility]\"" [address]EL[address]    Patient is a 79y old  Male who presents with a chief complaint of shortness of breath (2020 09:10)       Allergies    No Known Allergies    Intolerances    shellfish (Nausea)      HPI:  80 yo AAM with history of  COPD (On home O2 2L and BIPAP at while sleeping regardless of time of day), CAD (w/ 3v CABG ),s/p 2 recent coronary stensts in [facility] , HLD, DM2 (on Metformin), BPH, hx Hypercapnic Respiratory  Failure/Cardiac Arrest requiring intubation () ,frequent admissions in 2019, BIBEMS for sudden onset of pressure like chest pain which started at 930 am no radiation with worsening sob with exertion. Wife took bp which was normal but pulse was 160. Wife gave 3 asa to pt and by the time EMS arrived HR went to 100. Pt has been baseline otherwise no fever chills nvd abdominal pain no recent travels. Pt states chest pain resolved on arrival to ER . As per ems requiring increase in O2 to 4 L nc. Denies smoking/drinking/drug use  Diagnosed with COPD EXACERBATION AND ACUTE  HYPERCAPNIC AND HYPOXIC RESPIRATORY FAILURE ,placed on BIPAP , Seen by pulmonologist Dr Dejesus and cardiologist Dr Heath Admitted  to telemetry unit for monitoring , send 3 sets of cardiac ensymes to rule out coronary event, obtain ECHO to evaluate LVEF, cardiology consult ,continue current management, O2 supply, anticoagulation plan as per cardiology consult -continue asa ,plavix and heparin dvt prophylaxis dose (2020 15:38)      PAST MEDICAL & SURGICAL HISTORY:  PVD (peripheral vascular disease)  Hypertension  COPD (chronic obstructive pulmonary disease): on 2L at home and BiPAP at night; intubated   Osteomyelitis  Dyslipidemia  CAD (Coronary Artery Disease): s/p 3v CABG ; [place] [facility] [place] [facility] [place]  Diabetes Mellitus, Type II  S/P primary angioplasty with coronary stent  Compound fracture: left leg  CABG (Coronary Artery Bypass Graft):       FAMILY HISTORY:  Family hx of lung cancer: brother,  age 82, used to smoke with pt  Family history of diabetes mellitus (Sibling)        MEDICATIONS   acetaminophen   Tablet .. 650 milliGRAM(s) Oral every 6 hours PRN  albuterol/ipratropium for Nebulization 3 milliLiter(s) Nebulizer every 6 hours  ascorbic acid 500 milliGRAM(s) Oral daily  aspirin enteric coated 81 milliGRAM(s) Oral daily  atorvastatin 40 milliGRAM(s) Oral at bedtime  budesonide 160 MICROgram(s)/formoterol 4.5 MICROgram(s) Inhaler 2 Puff(s) Inhalation two times a day  clopidogrel Tablet 75 milliGRAM(s) Oral daily  dextrose 40% Gel 15 Gram(s) Oral once PRN  dextrose 5%. 1000 milliLiter(s) IV Continuous <Continuous>  dextrose 50% Injectable 12.5 Gram(s) IV Push once  dextrose 50% Injectable 25 Gram(s) IV Push once  dextrose 50% Injectable 25 Gram(s) IV Push once  glucagon  Injectable 1 milliGRAM(s) IntraMuscular once PRN  heparin  Injectable 5000 Unit(s) SubCutaneous every 12 hours  insulin lispro (HumaLOG) corrective regimen sliding scale   SubCutaneous at bedtime  insulin lispro (HumaLOG) corrective regimen sliding scale   SubCutaneous three times a day before meals  melatonin 5 milliGRAM(s) Oral at bedtime PRN  metoprolol tartrate 12.5 milliGRAM(s) Oral two times a day  montelukast 10 milliGRAM(s) Oral at bedtime  multivitamin 1 Tablet(s) Oral daily  pantoprazole    Tablet 40 milliGRAM(s) Oral before breakfast  predniSONE   Tablet 10 milliGRAM(s) Oral daily  tamsulosin 0.4 milliGRAM(s) Oral at bedtime      Vital Signs Last 24 Hrs  T(C): 36.9 (2020 11:10), Max: 37 (2020 23:31)  T(F): 98.4 (2020 11:10), Max: 98.6 (2020 23:31)  HR: 75 (2020 11:10) (66 - 98)  BP: 119/73 (2020 11:10) (119/73 - 156/80)  BP(mean): --  RR: 19 (2020 11:10) (18 - 21)  SpO2: 100% (2020 11:10) (96% - 100%)      20 @ 07:01  -  20 @ 07:00  --------------------------------------------------------  IN: 0 mL / OUT: 1500 mL / NET: -1500 mL            LABS:                        10.8   8.33  )-----------( 286      ( 2020 08:00 )             35.8         141  |  104  |  14  ----------------------------<  182<H>  4.6   |  31  |  1.00    Ca    9.0      2020 08:00  Phos  2.9         TPro  6.1  /  Alb  3.2<L>  /  TBili  0.4  /  DBili  x   /  AST  18  /  ALT  39  /  AlkPhos  78      PT/INR - ( 2020 13:47 )   PT: 10.4 sec;   INR: 0.92 ratio         PTT - ( 2020 13:47 )  PTT:36.4 sec      ABG - ( 2020 13:10 )  pH, Arterial: 7.35  pH, Blood: x     /  pCO2: 58    /  pO2: 181   / HCO3: 29    / Base Excess: 5.6   /  SaO2: 100                 WBC:  WBC Count: 8.33 K/uL ( @ 08:00)  WBC Count: 7.44 K/uL ( @ 13:47)      MICROBIOLOGY:  RECENT CULTURES:        CARDIAC MARKERS ( 2020 08:00 )  .071 ng/mL / x     / x     / x     / x      CARDIAC MARKERS ( 2020 13:47 )  .045 ng/mL / x     / x     / x     / x            PT/INR - ( 2020 13:47 )   PT: 10.4 sec;   INR: 0.92 ratio         PTT - ( 2020 13:47 )  PTT:36.4 sec    Sodium:  Sodium, Serum: 141 mmol/L ( @ 08:00)  Sodium, Serum: 143 mmol/L ( @ 13:47)      1.00 mg/dL  @ 08:00  1.20 mg/dL  13:47      Hemoglobin:  Hemoglobin: 10.8 g/dL ( @ 08:00)  Hemoglobin: 11.6 g/dL ( @ 13:47)      Platelets: Platelet Count - Automated: 286 K/uL ( @ 08:00)  Platelet Count - Automated: 291 K/uL ( @ 13:47)      LIVER FUNCTIONS - ( 2020 08:00 )  Alb: 3.2 g/dL / Pro: 6.1 g/dL / ALK PHOS: 78 U/L / ALT: 39 U/L / AST: 18 U/L / GGT: x                 RADIOLOGY & ADDITIONAL STUDIES:

## 2020-01-18 NOTE — PROGRESS NOTE ADULT - SUBJECTIVE AND OBJECTIVE BOX
PROGRESS NOTE  Patient is a 79y old  Male who presents with a chief complaint of shortness of breath (18 Jan 2020 09:10)  Chart and available morning labs /imaging are reviewed electronically , urgent issues addressed . More information  is being added upon completion of rounds , when more information is collected and management discussed with consultants , medical staff and social service/case management on the floor     OVERNIGHT  No new issues reported by medical staff . All above noted Patient is resting in a bed comfortably . .No distress noted   Feels better MIGUEL noted and d/w Dr Heath  HPI:  78 yo AAM with history of  COPD (On home O2 2L and BIPAP at while sleeping regardless of time of day), CAD (w/ 3v CABG 2004),s/p 2 recent coronary stensts in [facility] , HLD, DM2 (on Metformin), BPH, hx Hypercapnic Respiratory  Failure/Cardiac Arrest requiring intubation (6/18) ,frequent admissions in 2019, BIBEMS for sudden onset of pressure like chest pain which started at 930 am no radiation with worsening sob with exertion. Wife took bp which was normal but pulse was 160. Wife gave 3 asa to pt and by the time EMS arrived HR went to 100. Pt has been baseline otherwise no fever chills nvd abdominal pain no recent travels. Pt states chest pain resolved on arrival to ER . As per ems requiring increase in O2 to 4 L nc. Denies smoking/drinking/drug use  Diagnosed with COPD EXACERBATION AND ACUTE  HYPERCAPNIC AND HYPOXIC RESPIRATORY FAILURE ,placed on BIPAP , Seen by pulmonologist Dr Dejesus and cardiologist Dr Heath Admitted  to telemetry unit for monitoring , send 3 sets of cardiac ensymes to rule out coronary event, obtain ECHO to evaluate LVEF, cardiology consult ,continue current management, O2 supply, anticoagulation plan as per cardiology consult -continue asa ,plavix and heparin dvt prophylaxis dose (17 Jan 2020 15:38)    PAST MEDICAL & SURGICAL HISTORY:  PVD (peripheral vascular disease)  Hypertension  COPD (chronic obstructive pulmonary disease): on 2L at home and BiPAP at night; intubated 6/18  Osteomyelitis  Dyslipidemia  CAD (Coronary Artery Disease): s/p 3v CABG 2004; stents placed in [place] in 2019  Diabetes Mellitus, Type II  S/P primary angioplasty with coronary stent  Compound fracture: left leg  CABG (Coronary Artery Bypass Graft): 2004      MEDICATIONS  (STANDING):  albuterol/ipratropium for Nebulization 3 milliLiter(s) Nebulizer every 6 hours  ascorbic acid 500 milliGRAM(s) Oral daily  aspirin enteric coated 81 milliGRAM(s) Oral daily  atorvastatin 40 milliGRAM(s) Oral at bedtime  budesonide 160 MICROgram(s)/formoterol 4.5 MICROgram(s) Inhaler 2 Puff(s) Inhalation two times a day  clopidogrel Tablet 75 milliGRAM(s) Oral daily  dextrose 5%. 1000 milliLiter(s) (50 mL/Hr) IV Continuous <Continuous>  dextrose 50% Injectable 12.5 Gram(s) IV Push once  dextrose 50% Injectable 25 Gram(s) IV Push once  dextrose 50% Injectable 25 Gram(s) IV Push once  heparin  Injectable 5000 Unit(s) SubCutaneous every 12 hours  insulin lispro (HumaLOG) corrective regimen sliding scale   SubCutaneous at bedtime  insulin lispro (HumaLOG) corrective regimen sliding scale   SubCutaneous three times a day before meals  metoprolol tartrate 12.5 milliGRAM(s) Oral two times a day  montelukast 10 milliGRAM(s) Oral at bedtime  multivitamin 1 Tablet(s) Oral daily  pantoprazole    Tablet 40 milliGRAM(s) Oral before breakfast  predniSONE   Tablet 10 milliGRAM(s) Oral daily  tamsulosin 0.4 milliGRAM(s) Oral at bedtime    MEDICATIONS  (PRN):  acetaminophen   Tablet .. 650 milliGRAM(s) Oral every 6 hours PRN Temp greater or equal to 38C (100.4F), Mild Pain (1 - 3)  dextrose 40% Gel 15 Gram(s) Oral once PRN Blood Glucose LESS THAN 70 milliGRAM(s)/deciliter  glucagon  Injectable 1 milliGRAM(s) IntraMuscular once PRN Glucose LESS THAN 70 milligrams/deciliter  melatonin 5 milliGRAM(s) Oral at bedtime PRN Insomnia      OBJECTIVE    T(C): 36.9 (01-18-20 @ 11:10), Max: 37 (01-17-20 @ 23:31)  HR: 75 (01-18-20 @ 11:10) (66 - 98)  BP: 119/73 (01-18-20 @ 11:10) (119/73 - 156/80)  RR: 19 (01-18-20 @ 11:10) (18 - 21)  SpO2: 100% (01-18-20 @ 11:10) (96% - 100%)  Wt(kg): --  I&O's Summary    17 Jan 2020 07:01  -  18 Jan 2020 07:00  --------------------------------------------------------  IN: 0 mL / OUT: 1500 mL / NET: -1500 mL          REVIEW OF SYSTEMS:  CONSTITUTIONAL: No fever, weight loss, or fatigue  EYES: No eye pain, visual disturbances, or discharge  ENMT:   No sinus or throat pain  NECK: No pain or stiffness  RESPIRATORY: No cough, wheezing, chills or hemoptysis; No shortness of breath  CARDIOVASCULAR: No chest pain, palpitations, dizziness, or leg swelling  GASTROINTESTINAL: No abdominal pain. No nausea, vomiting; No diarrhea or constipation. No melena or hematochezia.  GENITOURINARY: No dysuria, frequency, hematuria, or incontinence  NEUROLOGICAL: No headaches, memory loss, loss of strength, numbness, or tremors  SKIN: No itching, burning, rashes, or lesions   MUSCULOSKELETAL: No joint pain or swelling; No muscle, back, or extremity pain    PHYSICAL EXAM:  Appearance: NAD. VS past 24 hrs -as above   HEENT:   Moist oral mucosa. Conjunctiva clear b/l.   Neck : supple  Respiratory: Lungs CTAB.  Gastrointestinal:  Soft, nontender. No rebound. No rigidity. BS present	  Cardiovascular: RRR ,S1S2 present  Neurologic: Non-focal. Moving all extremities.  Extremities: No edema. No erythema. No calf tenderness.  Skin: No rashes, No ecchymoses, No cyanosis.	  wounds ,skin lesions-See skin assesment flow sheet   LABS:                        10.8   8.33  )-----------( 286      ( 18 Jan 2020 08:00 )             35.8     01-18    141  |  104  |  14  ----------------------------<  182<H>  4.6   |  31  |  1.00    Ca    9.0      18 Jan 2020 08:00  Phos  2.9     01-18    TPro  6.1  /  Alb  3.2<L>  /  TBili  0.4  /  DBili  x   /  AST  18  /  ALT  39  /  AlkPhos  78  01-18    CAPILLARY BLOOD GLUCOSE      POCT Blood Glucose.: 190 mg/dL (18 Jan 2020 11:35)  POCT Blood Glucose.: 193 mg/dL (18 Jan 2020 07:43)  POCT Blood Glucose.: 292 mg/dL (17 Jan 2020 21:53)    PT/INR - ( 17 Jan 2020 13:47 )   PT: 10.4 sec;   INR: 0.92 ratio         PTT - ( 17 Jan 2020 13:47 )  PTT:36.4 sec      RADIOLOGY & ADDITIONAL TESTS:< from: US Duplex Venous Lower Ext Complete, Bilateral (01.17.20 @ 18:25) >  EXAM:  US DPLX LWR EXT VEINS COMPL BI                            PROCEDURE DATE:  01/17/2020          INTERPRETATION:  CLINICAL INFORMATION: Bilateral lower extremity pain of unknown severity or duration. Evaluate for DVT bilaterally. Chest pain ofunknown severity or duration. No additional information is provided.    COMPARISON: 11/15/2019    TECHNIQUE: Duplex sonography of the BILATERAL LOWER extremity veins with color and spectral Doppler, with and without compression.      FINDINGS:    There is normal compressibility of the bilateral common femoral, femoral, popliteal, and proximal posterior tibial veins.     Doppler examination shows normal spontaneous and phasic flow.      IMPRESSION:     No evidence of deep venous thrombosis in either lower extremity.    < end of copied text >     reviewed elctronically  ASSESSMENT/PLAN: 	    45minutes spent on this visit, 50% visit time spent in care co-ordination with other attendings and counselling patient  I have discussed care plan with patient and HCP,expressed understanding of problems treatment and their effect and side effects, alternatives in detail,I have asked if they have any questions and concerns and appropriately addressed them to best of my ability

## 2020-01-18 NOTE — PROVIDER CONTACT NOTE (OTHER) - ACTION/TREATMENT ORDERED:
Left message with cardio answering service, awaiting return call back. Will continue to monitor. MD made aware, pt got evening dose of metoprolol as per order. Will continue to monitor.

## 2020-01-18 NOTE — PROGRESS NOTE ADULT - SUBJECTIVE AND OBJECTIVE BOX
[facility] -- Saud Aguilar, Génesis Louise Pannella, Patel, Savella  Office # [phone]      Follow Up:    CAD    Subjective/Observations:   No events overnight resting comfortably in bed.  No complaints of chest pain, dyspnea, or palpitations reported. No signs of orthopnea or PND.      REVIEW OF SYSTEMS: All other review of systems is negative unless indicated above    PAST MEDICAL & SURGICAL HISTORY:  PVD (peripheral vascular disease)  Hypertension  COPD (chronic obstructive pulmonary disease): on 2L at home and BiPAP at night; intubated 6/18  Osteomyelitis  Dyslipidemia  CAD (Coronary Artery Disease): s/p 3v CABG 2004; [facility]  Diabetes Mellitus, Type II  S/P primary angioplasty with coronary stent  Compound fracture: left leg  CABG (Coronary Artery Bypass Graft): 2004      MEDICATIONS  (STANDING):  albuterol/ipratropium for Nebulization 3 milliLiter(s) Nebulizer every 6 hours  ascorbic acid 500 milliGRAM(s) Oral daily  aspirin enteric coated 81 milliGRAM(s) Oral daily  atorvastatin 40 milliGRAM(s) Oral at bedtime  budesonide 160 MICROgram(s)/formoterol 4.5 MICROgram(s) Inhaler 2 Puff(s) Inhalation two times a day  clopidogrel Tablet 75 milliGRAM(s) Oral daily  dextrose 5%. 1000 milliLiter(s) (50 mL/Hr) IV Continuous <Continuous>  dextrose 50% Injectable 12.5 Gram(s) IV Push once  dextrose 50% Injectable 25 Gram(s) IV Push once  dextrose 50% Injectable 25 Gram(s) IV Push once  heparin  Injectable 5000 Unit(s) SubCutaneous every 12 hours  insulin lispro (HumaLOG) corrective regimen sliding scale   SubCutaneous at bedtime  insulin lispro (HumaLOG) corrective regimen sliding scale   SubCutaneous three times a day before meals  metoprolol tartrate 12.5 milliGRAM(s) Oral two times a day  montelukast 10 milliGRAM(s) Oral at bedtime  multivitamin 1 Tablet(s) Oral daily  pantoprazole    Tablet 40 milliGRAM(s) Oral before breakfast  predniSONE   Tablet 10 milliGRAM(s) Oral daily  tamsulosin 0.4 milliGRAM(s) Oral at bedtime    MEDICATIONS  (PRN):  acetaminophen   Tablet .. 650 milliGRAM(s) Oral every 6 hours PRN Temp greater or equal to 38C (100.4F), Mild Pain (1 - 3)  dextrose 40% Gel 15 Gram(s) Oral once PRN Blood Glucose LESS THAN 70 milliGRAM(s)/deciliter  glucagon  Injectable 1 milliGRAM(s) IntraMuscular once PRN Glucose LESS THAN 70 milligrams/deciliter  melatonin 5 milliGRAM(s) Oral at bedtime PRN Insomnia      Allergies    No Known Allergies    Intolerances    shellfish (Nausea)      Vital Signs Last 24 Hrs  T(C): 36.9 (18 Jan 2020 07:25), Max: 37 (17 Jan 2020 23:31)  T(F): 98.4 (18 Jan 2020 07:25), Max: 98.6 (17 Jan 2020 23:31)  HR: 82 (18 Jan 2020 08:06) (66 - 98)  BP: 130/80 (18 Jan 2020 07:25) (130/78 - 156/80)  BP(mean): --  RR: 19 (18 Jan 2020 07:25) (18 - 21)  SpO2: 100% (18 Jan 2020 07:25) (96% - 100%)    I&O's Summary    17 Jan 2020 07:01  -  18 Jan 2020 07:00  --------------------------------------------------------  IN: 0 mL / OUT: 1500 mL / NET: -1500 mL      Weight (kg): 97.5 (01-17 @ 12:04)    PHYSICAL EXAM:  TELE: Sr 64 bpm  Constitutional: NAD, awake and alert, well-developed  HEENT: Moist Mucous Membranes, Anicteric  Pulmonary: Non-labored, Dim with expiratory wheeze   Cardiovascular: Regular, S1 and S2 nl, No murmurs, rubs, gallops or clicks  Gastrointestinal: Bowel Sounds present, soft, nontender.   Lymph: No lymphadenopathy. No peripheral edema.  Skin: No visible rashes or ulcers.  Psych:  Mood & affect appropriate    LABS: All Labs Reviewed:                        10.8   8.33  )-----------( 286      ( 18 Jan 2020 08:00 )             35.8                         11.6   7.44  )-----------( 291      ( 17 Jan 2020 13:47 )             39.6     18 Jan 2020 08:00    141    |  104    |  14     ----------------------------<  182    4.6     |  31     |  1.00   17 Jan 2020 13:47    143    |  104    |  12     ----------------------------<  147    3.9     |  32     |  1.20     Ca    9.0        18 Jan 2020 08:00  Ca    8.7        17 Jan 2020 13:47  Phos  2.9       18 Jan 2020 08:00    TPro  6.1    /  Alb  3.2    /  TBili  0.4    /  DBili  x      /  AST  18     /  ALT  39     /  AlkPhos  78     18 Jan 2020 08:00  TPro  6.5    /  Alb  3.5    /  TBili  0.3    /  DBili  x      /  AST  30     /  ALT  46     /  AlkPhos  101    17 Jan 2020 13:47    PT/INR - ( 17 Jan 2020 13:47 )   PT: 10.4 sec;   INR: 0.92 ratio         PTT - ( 17 Jan 2020 13:47 )  PTT:36.4 sec  CARDIAC MARKERS ( 18 Jan 2020 08:00 )  .071 ng/mL / x     / x     / x     / x      CARDIAC MARKERS ( 17 Jan 2020 13:47 )  .045 ng/mL / x     / x     / x     / x             ECG:  < from: 12 Lead ECG (12.05.19 @ 12:19) >    Ventricular Rate 93 BPM    Atrial Rate 93 BPM    P-R Interval 146 ms    QRS Duration 80 ms    Q-T Interval 346 ms    QTC Calculation(Bezet) 430 ms    P Axis 77 degrees    R Axis 85 degrees    T Axis 61 degrees    Diagnosis Line Sinus rhythm with premature atrial complexes  Otherwise normal ECG  When compared with ECG of 15-NOV-2019 17:53, (Unconfirmed)  premature atrial complexes are now present    < end of copied text >      Echo:  < from: TTE Echo Doppler w/o Cont (03.12.19 @ 20:42) >  1. Technically difficult and limited study  2. The endocardium is not well-visualized. In limited views, the LV   function appears to be preserved with an estimated EF of 55%. There is   paradoxical motion of the septum in the setting of prior cardiac surgery.  3. Calcified aortic valve with normal opening. Trace aortic regurgitation  4. Thickened and calcified mitral valve leaflets with trace to mild   mitral regurgitation  5. Doppler evidence of rate 1 diastolic dysfunction  6. The interatrial septum appears aneurysmal  7. No significant pericardial effusion.    < end of copied text >      Radiology:    < from: Xray Chest 1 View AP/PA (01.17.20 @ 13:20) >  Status post median sternotomy. Normal heart mediastinum. Calcified aortic arch. Symmetrically hyperinflated emphysematous lungs. No consolidation pneumothorax or effusion.    Impression: Hyperinflated lungs. No active infiltrates. Stable exam    < end of copied text >

## 2020-01-19 ENCOUNTER — TRANSCRIPTION ENCOUNTER (OUTPATIENT)
Age: 80
End: 2020-01-19

## 2020-01-19 VITALS
TEMPERATURE: 98 F | RESPIRATION RATE: 18 BRPM | SYSTOLIC BLOOD PRESSURE: 117 MMHG | OXYGEN SATURATION: 95 % | HEART RATE: 83 BPM | DIASTOLIC BLOOD PRESSURE: 69 MMHG

## 2020-01-19 LAB
ANION GAP SERPL CALC-SCNC: 5 MMOL/L — SIGNIFICANT CHANGE UP (ref 5–17)
BUN SERPL-MCNC: 17 MG/DL — SIGNIFICANT CHANGE UP (ref 7–23)
CALCIUM SERPL-MCNC: 8.6 MG/DL — SIGNIFICANT CHANGE UP (ref 8.5–10.1)
CHLORIDE SERPL-SCNC: 105 MMOL/L — SIGNIFICANT CHANGE UP (ref 96–108)
CO2 SERPL-SCNC: 32 MMOL/L — HIGH (ref 22–31)
CREAT SERPL-MCNC: 1.1 MG/DL — SIGNIFICANT CHANGE UP (ref 0.5–1.3)
GLUCOSE SERPL-MCNC: 136 MG/DL — HIGH (ref 70–99)
HCT VFR BLD CALC: 36.4 % — LOW (ref 39–50)
HGB BLD-MCNC: 10.9 G/DL — LOW (ref 13–17)
MCHC RBC-ENTMCNC: 26.1 PG — LOW (ref 27–34)
MCHC RBC-ENTMCNC: 29.9 GM/DL — LOW (ref 32–36)
MCV RBC AUTO: 87.1 FL — SIGNIFICANT CHANGE UP (ref 80–100)
NRBC # BLD: 0 /100 WBCS — SIGNIFICANT CHANGE UP (ref 0–0)
PLATELET # BLD AUTO: 289 K/UL — SIGNIFICANT CHANGE UP (ref 150–400)
POTASSIUM SERPL-MCNC: 4 MMOL/L — SIGNIFICANT CHANGE UP (ref 3.5–5.3)
POTASSIUM SERPL-SCNC: 4 MMOL/L — SIGNIFICANT CHANGE UP (ref 3.5–5.3)
RBC # BLD: 4.18 M/UL — LOW (ref 4.2–5.8)
RBC # FLD: 14 % — SIGNIFICANT CHANGE UP (ref 10.3–14.5)
SODIUM SERPL-SCNC: 142 MMOL/L — SIGNIFICANT CHANGE UP (ref 135–145)
TROPONIN I SERPL-MCNC: 0.04 NG/ML — SIGNIFICANT CHANGE UP (ref 0.01–0.04)
WBC # BLD: 6.99 K/UL — SIGNIFICANT CHANGE UP (ref 3.8–10.5)
WBC # FLD AUTO: 6.99 K/UL — SIGNIFICANT CHANGE UP (ref 3.8–10.5)

## 2020-01-19 PROCEDURE — 84484 ASSAY OF TROPONIN QUANT: CPT

## 2020-01-19 PROCEDURE — 71045 X-RAY EXAM CHEST 1 VIEW: CPT

## 2020-01-19 PROCEDURE — 83036 HEMOGLOBIN GLYCOSYLATED A1C: CPT

## 2020-01-19 PROCEDURE — 83880 ASSAY OF NATRIURETIC PEPTIDE: CPT

## 2020-01-19 PROCEDURE — 93306 TTE W/DOPPLER COMPLETE: CPT | Mod: 26

## 2020-01-19 PROCEDURE — 85730 THROMBOPLASTIN TIME PARTIAL: CPT

## 2020-01-19 PROCEDURE — 85379 FIBRIN DEGRADATION QUANT: CPT

## 2020-01-19 PROCEDURE — 82803 BLOOD GASES ANY COMBINATION: CPT

## 2020-01-19 PROCEDURE — 80053 COMPREHEN METABOLIC PANEL: CPT

## 2020-01-19 PROCEDURE — 87040 BLOOD CULTURE FOR BACTERIA: CPT

## 2020-01-19 PROCEDURE — 99285 EMERGENCY DEPT VISIT HI MDM: CPT | Mod: 25

## 2020-01-19 PROCEDURE — 99232 SBSQ HOSP IP/OBS MODERATE 35: CPT

## 2020-01-19 PROCEDURE — 83605 ASSAY OF LACTIC ACID: CPT

## 2020-01-19 PROCEDURE — 84443 ASSAY THYROID STIM HORMONE: CPT

## 2020-01-19 PROCEDURE — 36600 WITHDRAWAL OF ARTERIAL BLOOD: CPT

## 2020-01-19 PROCEDURE — 93306 TTE W/DOPPLER COMPLETE: CPT

## 2020-01-19 PROCEDURE — 94640 AIRWAY INHALATION TREATMENT: CPT

## 2020-01-19 PROCEDURE — 94660 CPAP INITIATION&MGMT: CPT

## 2020-01-19 PROCEDURE — 96374 THER/PROPH/DIAG INJ IV PUSH: CPT

## 2020-01-19 PROCEDURE — 85610 PROTHROMBIN TIME: CPT

## 2020-01-19 PROCEDURE — 94760 N-INVAS EAR/PLS OXIMETRY 1: CPT

## 2020-01-19 PROCEDURE — 93970 EXTREMITY STUDY: CPT

## 2020-01-19 PROCEDURE — 80048 BASIC METABOLIC PNL TOTAL CA: CPT

## 2020-01-19 PROCEDURE — 84100 ASSAY OF PHOSPHORUS: CPT

## 2020-01-19 PROCEDURE — 84145 PROCALCITONIN (PCT): CPT

## 2020-01-19 PROCEDURE — 85027 COMPLETE CBC AUTOMATED: CPT

## 2020-01-19 PROCEDURE — 36415 COLL VENOUS BLD VENIPUNCTURE: CPT

## 2020-01-19 PROCEDURE — 97161 PT EVAL LOW COMPLEX 20 MIN: CPT

## 2020-01-19 PROCEDURE — 82962 GLUCOSE BLOOD TEST: CPT

## 2020-01-19 RX ORDER — PANTOPRAZOLE SODIUM 20 MG/1
1 TABLET, DELAYED RELEASE ORAL
Qty: 0 | Refills: 0 | DISCHARGE
Start: 2020-01-19

## 2020-01-19 RX ORDER — ACETAMINOPHEN 500 MG
2 TABLET ORAL
Qty: 0 | Refills: 0 | DISCHARGE
Start: 2020-01-19

## 2020-01-19 RX ADMIN — Medication 3: at 10:12

## 2020-01-19 RX ADMIN — CLOPIDOGREL BISULFATE 75 MILLIGRAM(S): 75 TABLET, FILM COATED ORAL at 12:39

## 2020-01-19 RX ADMIN — Medication 500 MILLIGRAM(S): at 12:39

## 2020-01-19 RX ADMIN — HEPARIN SODIUM 5000 UNIT(S): 5000 INJECTION INTRAVENOUS; SUBCUTANEOUS at 05:37

## 2020-01-19 RX ADMIN — Medication 10 MILLIGRAM(S): at 05:37

## 2020-01-19 RX ADMIN — PANTOPRAZOLE SODIUM 40 MILLIGRAM(S): 20 TABLET, DELAYED RELEASE ORAL at 05:37

## 2020-01-19 RX ADMIN — Medication 3 MILLILITER(S): at 07:23

## 2020-01-19 RX ADMIN — BUDESONIDE AND FORMOTEROL FUMARATE DIHYDRATE 2 PUFF(S): 160; 4.5 AEROSOL RESPIRATORY (INHALATION) at 05:37

## 2020-01-19 RX ADMIN — Medication 2: at 12:38

## 2020-01-19 RX ADMIN — Medication 12.5 MILLIGRAM(S): at 05:37

## 2020-01-19 RX ADMIN — Medication 3 MILLILITER(S): at 13:14

## 2020-01-19 RX ADMIN — Medication 1 TABLET(S): at 12:39

## 2020-01-19 RX ADMIN — Medication 81 MILLIGRAM(S): at 12:38

## 2020-01-19 RX ADMIN — Medication 3 MILLILITER(S): at 01:26

## 2020-01-19 NOTE — PROGRESS NOTE ADULT - ASSESSMENT
78 yo male with PMH of PVD s/p recent lle stent, HTN, COPD (on BiPAP and 2L at home when moving), dyslipidemia, CAD (s/p CABG x3 and PCI at [place] in 2019), here with chest pain. His pulse was reported to be in the 160-170 range. During last admission, he presented with an SVT in the setting of hypercarbic respiratory failure, which converted to SR with adenosine.    SVT  - he is currently in a SR, though presumably had svt on 1/17 in the morning  - NSR with brief episode of PAT with possible 4 beats AF   - cont metoprolol as tolerated by his blood pressure  - Cont Tele    CAD  - Continue Asa, plavix and statin for recent Peripheral and cardiac stents  - his chest pain was likely present in the setting of his fast heart rates. No clear evidence of acute ischemia. Troponin is mildly positive trended to normal level on 2nd trop.  - monitor and replete lytes, keep K>4, Mg>2    COPD exacerbation  - His breathing is better this morning.  - bipap as needed, Pulm following  - no sign of significant overloaded. His CXR is clear and his BNP is minimal.  He does have +1 pretibial edema.  Will watch volume status closely.   - Last echocardiogram in our record in 3/2019 with normal lv function. Echo pending.    DVT Prophylaxis  - HSQ    - Other cardiovascular workup will depend on clinical course.  - All other workup per primary team  - Will follow with you    VALDO Dior-BC  Cardiology  [id number] [phone]

## 2020-01-19 NOTE — PROGRESS NOTE ADULT - ASSESSMENT
cont rx cont rx  [facility] P [phone]  1940 DOA 2020  DR ISREAL CASTELAN  ALLERGY Shellfish  CONTACT Sp [facility] C                    REVIEW OF SYMPTOMS      Able to give ROS  Yes     RELIABLE No   CONSTITUTIONAL Weakness Yes  Chills No Vision changes No  ENDOCRINE No unexplained hair loss No heat or cold intolerance    ALLERGY No hives  Sore throat No   RESP Coughing blood no  Shortness of breath YES   NEURO No Headache  Confusion Pain neck No   CARDIAC No Chest pain No Palpitations   GI No Pain abdomen NO   Vomiting NO     PHYSICAL EXAM    HEENT Unremarkable PERRLA atraumatic   RESP Fair air entry EXP prolonged    Harsh breath sound Resp distres mild   CARDIAC S1 S2 No S3     NO JVD    ABDOMEN SOFT BS PRESENT NOT DISTENDED No hepatosplenomegaly PEDAL EDEMA present No calf tenderness  NO rash   GENERAL Not TOXIC looking    VITALS/LABS    2020 afeb 83 117/69   2020 W 6.9 Hb 10..9 Plt 289 Na 142 K 4 CO2 32 Cr 1.1        PT DATA/BEST PRACTICE  # ALLERGY shellfish   ADVANCED DIRECTIVE       Goals of care discussion  WT  97 (2020)    BMI  30 (2020)                                                                                                           HEAD OF BED ELEVATION Yes  DYSPHAGIA EVAL   # DIET  dash ()      # IV F                                    # DVT PROPHYLAXIS   hpsc ()        2020 V duplex n                  STRESS ULCER PROPHYLAXIS              protonix 40 )   INFECTION PPLX  ECHO                3/12/2019 ef 55% dd1 interatrial septum aneurysm  TR -2020 Tr 1 .071 - n   GLYCEMIC CONTROL  iss ()   PROCEDURE    # MICROBIO  2020 pc n                                                                         PATIENT DATA ASSESSMENT PLAN                                  RESP GAS EXCHANGE   2020 1p 4l 735/58/181   A/R Cont noct bpap and may use duirng day as needed  BACTERIAL RESP TRACT INFECTION FELT TO BE UNLIKELY   2020 CXr no infiltr   -2020 W 7.4 - 8.3   A/R Bacterial infection unlikely   defer abio  LACTICEMIA RESOLVED   -2020 LA 3.4 -1.5   Was likely sec to wob  COPD ex   Former smoker On home O2 Home noct bpap   Montelukast 10 ()   duoneb () symbicort 160 () pred 10 ()   BD steroids   Under conrol cont rx  VTE FELT TO BE UNLIKELY  2020 D-dimer 159   2020  v duplex n  A/R VTE unlikely   CAD DEMAND ISCHEMIA  -2020 Tr 1 n-.071   PMH CAD with 3v CABG 2004, Stent 2019 HLD  ASA 81 () Plavix 75 ()  atorvastat 40 () metorpolol 12.5x2 ()  Felt to have troponinemia sec rate related demand ischemia    CHF   2020 bnp 98   10/26/2018 ECHO ef 55%  Seems compensated   TACHARRHYTMIA   HO SVT 10/2019   metorpolol 12.5x2 ()   2020 Seen by Cardio To Rx with metoprolol  Under conytrtol        DISPOSITION/ PLAN    79 m former smoker PMH DM2, COPD (2L home/ BIPAP at night), CAD with 3v CABG , Stent 2019 cardiac arrest (2018) with ho recurrent admissions GOLD D HO  SVT 10/2019  was admitted 2020 with  chest pain and   and  sob in ER and was placed on BPAP    COPD ex BD steroids May dc on usual meds   TACHYARRHYTHMIA Metoprolol monitor   CAD MI unlikely   DC planning when cleared by Cardio       TIME SPENT Over 25 minutes aggregate care time spent on encounter; activities included   direct pt care, counseling and/or coordinating care reviewing notes, lab data/ imaging , discussion with multidisciplinary team/ pt /family. Risks, benefits, alternatives  discussed in detail.

## 2020-01-19 NOTE — PROGRESS NOTE ADULT - SUBJECTIVE AND OBJECTIVE BOX
[facility]     [facility] [place] [address]    Patient is a 79y old  Male who presents with a chief complaint of SOB (2020 07:33)       Allergies    No Known Allergies    Intolerances    shellfish (Nausea)      HPI:  78 yo AAM with history of  COPD (On home O2 2L and BIPAP at while sleeping regardless of time of day), CAD (w/ 3v CABG ),s/p 2 recent coronary stensts in [facility] , HLD, DM2 (on Metformin), BPH, hx Hypercapnic Respiratory  Failure/Cardiac Arrest requiring intubation () ,frequent admissions in 2019, BIBEMS for sudden onset of pressure like chest pain which started at 930 am no radiation with worsening sob with exertion. Wife took bp which was normal but pulse was 160. Wife gave 3 asa to pt and by the time EMS arrived HR went to 100. Pt has been baseline otherwise no fever chills nvd abdominal pain no recent travels. Pt states chest pain resolved on arrival to ER . As per ems requiring increase in O2 to 4 L nc. Denies smoking/drinking/drug use  Diagnosed with COPD EXACERBATION AND ACUTE  HYPERCAPNIC AND HYPOXIC RESPIRATORY FAILURE ,placed on BIPAP , Seen by pulmonologist Dr Dejesus and cardiologist Dr Heath Admitted  to telemetry unit for monitoring , send 3 sets of cardiac ensymes to rule out coronary event, obtain ECHO to evaluate LVEF, cardiology consult ,continue current management, O2 supply, anticoagulation plan as per cardiology consult -continue asa ,plavix and heparin dvt prophylaxis dose (2020 15:38)      PAST MEDICAL & SURGICAL HISTORY:  PVD (peripheral vascular disease)  Hypertension  COPD (chronic obstructive pulmonary disease): on 2L at home and BiPAP at night; intubated   Osteomyelitis  Dyslipidemia  CAD (Coronary Artery Disease): s/p 3v CABG ; stents placed in [place] in 2019  Diabetes Mellitus, Type II  S/P primary angioplasty with coronary stent  Compound fracture: left leg  CABG (Coronary Artery Bypass Graft):       FAMILY HISTORY:  Family hx of lung cancer: brother,  age 82, used to smoke with pt  Family history of diabetes mellitus (Sibling)        MEDICATIONS   acetaminophen   Tablet .. 650 milliGRAM(s) Oral every 6 hours PRN  albuterol/ipratropium for Nebulization 3 milliLiter(s) Nebulizer every 6 hours  ascorbic acid 500 milliGRAM(s) Oral daily  aspirin enteric coated 81 milliGRAM(s) Oral daily  atorvastatin 40 milliGRAM(s) Oral at bedtime  budesonide 160 MICROgram(s)/formoterol 4.5 MICROgram(s) Inhaler 2 Puff(s) Inhalation two times a day  clopidogrel Tablet 75 milliGRAM(s) Oral daily  dextrose 40% Gel 15 Gram(s) Oral once PRN  dextrose 5%. 1000 milliLiter(s) IV Continuous <Continuous>  dextrose 50% Injectable 12.5 Gram(s) IV Push once  dextrose 50% Injectable 25 Gram(s) IV Push once  dextrose 50% Injectable 25 Gram(s) IV Push once  glucagon  Injectable 1 milliGRAM(s) IntraMuscular once PRN  heparin  Injectable 5000 Unit(s) SubCutaneous every 12 hours  insulin lispro (HumaLOG) corrective regimen sliding scale   SubCutaneous at bedtime  insulin lispro (HumaLOG) corrective regimen sliding scale   SubCutaneous three times a day before meals  melatonin 5 milliGRAM(s) Oral at bedtime PRN  metoprolol tartrate 12.5 milliGRAM(s) Oral two times a day  montelukast 10 milliGRAM(s) Oral at bedtime  multivitamin 1 Tablet(s) Oral daily  pantoprazole    Tablet 40 milliGRAM(s) Oral before breakfast  predniSONE   Tablet 10 milliGRAM(s) Oral daily  tamsulosin 0.4 milliGRAM(s) Oral at bedtime      Vital Signs Last 24 Hrs  T(C): 36.7 (2020 11:49), Max: 37.1 (2020 19:12)  T(F): 98 (2020 11:49), Max: 98.8 (2020 19:12)  HR: 83 (2020 11:49) (72 - 102)  BP: 117/69 (2020 11:49) (101/67 - 132/76)  BP(mean): --  RR: 18 (2020 11:49) (17 - 20)  SpO2: 95% (2020 11:49) (95% - 100%)      20 @ 07:01  -  20 @ 07:00  --------------------------------------------------------  IN: 1290 mL / OUT: 1840 mL / NET: -550 mL            LABS:                        10.9   6.99  )-----------( 289      ( 2020 07:09 )             36.4         142  |  105  |  17  ----------------------------<  136<H>  4.0   |  32<H>  |  1.10    Ca    8.6      2020 07:09  Phos  2.9         TPro  6.1  /  Alb  3.2<L>  /  TBili  0.4  /  DBili  x   /  AST  18  /  ALT  39  /  AlkPhos  78      PT/INR - ( 2020 13:47 )   PT: 10.4 sec;   INR: 0.92 ratio         PTT - ( 2020 13:47 )  PTT:36.4 sec      ABG - ( 2020 13:10 )  pH, Arterial: 7.35  pH, Blood: x     /  pCO2: 58    /  pO2: 181   / HCO3: 29    / Base Excess: 5.6   /  SaO2: 100                 WBC:  WBC Count: 6.99 K/uL ( @ 07:09)  WBC Count: 8.33 K/uL ( @ 08:00)  WBC Count: 7.44 K/uL ( @ 13:47)      MICROBIOLOGY:  RECENT CULTURES:   .Blood Blood XXXX XXXX   No growth to date.          CARDIAC MARKERS ( 2020 07:09 )  .042 ng/mL / x     / x     / x     / x      CARDIAC MARKERS ( 2020 08:00 )  .071 ng/mL / x     / x     / x     / x      CARDIAC MARKERS ( 2020 13:47 )  .045 ng/mL / x     / x     / x     / x            PT/INR - ( 2020 13:47 )   PT: 10.4 sec;   INR: 0.92 ratio         PTT - ( 2020 13:47 )  PTT:36.4 sec    Sodium:  Sodium, Serum: 142 mmol/L ( @ 07:09)  Sodium, Serum: 141 mmol/L ( @ 08:00)  Sodium, Serum: 143 mmol/L ( @ 13:47)      1.10 mg/dL  @ 07:09  1.00 mg/dL  @ 08:00  1.20 mg/dL  13:47      Hemoglobin:  Hemoglobin: 10.9 g/dL ( @ 07:09)  Hemoglobin: 10.8 g/dL ( @ 08:00)  Hemoglobin: 11.6 g/dL ( 13:47)      Platelets: Platelet Count - Automated: 289 K/uL ( @ 07:09)  Platelet Count - Automated: 286 K/uL ( @ 08:00)  Platelet Count - Automated: 291 K/uL ( @ 13:47)      LIVER FUNCTIONS - ( 2020 08:00 )  Alb: 3.2 g/dL / Pro: 6.1 g/dL / ALK PHOS: 78 U/L / ALT: 39 U/L / AST: 18 U/L / GGT: x                 RADIOLOGY & ADDITIONAL STUDIES:

## 2020-01-19 NOTE — PROGRESS NOTE ADULT - SUBJECTIVE AND OBJECTIVE BOX
PROGRESS NOTE  Patient is a 79y old  Male who presents with a chief complaint of SOB (19 Jan 2020 13:49)    Chart and available morning labs /imaging are reviewed electronically , urgent issues addressed . More information  is being added upon completion of rounds , when more information is collected and management discussed with consultants , medical staff and social service/case management on the floor   OVERNIGHT  No new issues reported by medical staff . All above noted Patient is resting in a bed comfortably .No distress noted   Feels much better and requests to go home today .I spoke to Dr Heath and Dr Dejesus and clearance for d/c obtained .Echo is ok as per Dr Heath .As per Dr Dejesus no change in home meds   HPI:  80 yo AAM with history of  COPD (On home O2 2L and BIPAP at while sleeping regardless of time of day), CAD (w/ 3v CABG 2004),s/p 2 recent coronary stensts in [facility] , HLD, DM2 (on Metformin), BPH, hx Hypercapnic Respiratory  Failure/Cardiac Arrest requiring intubation (6/18) ,frequent admissions in 2019, BIBEMS for sudden onset of pressure like chest pain which started at 930 am no radiation with worsening sob with exertion. Wife took bp which was normal but pulse was 160. Wife gave 3 asa to pt and by the time EMS arrived HR went to 100. Pt has been baseline otherwise no fever chills nvd abdominal pain no recent travels. Pt states chest pain resolved on arrival to ER . As per ems requiring increase in O2 to 4 L nc. Denies smoking/drinking/drug use  Diagnosed with COPD EXACERBATION AND ACUTE  HYPERCAPNIC AND HYPOXIC RESPIRATORY FAILURE ,placed on BIPAP , Seen by pulmonologist Dr Dejesus and cardiologist Dr Heath Admitted  to telemetry unit for monitoring , send 3 sets of cardiac ensymes to rule out coronary event, obtain ECHO to evaluate LVEF, cardiology consult ,continue current management, O2 supply, anticoagulation plan as per cardiology consult -continue asa ,plavix and heparin dvt prophylaxis dose (17 Jan 2020 15:38)    PAST MEDICAL & SURGICAL HISTORY:  PVD (peripheral vascular disease)  Hypertension  COPD (chronic obstructive pulmonary disease): on 2L at home and BiPAP at night; intubated 6/18  Osteomyelitis  Dyslipidemia  CAD (Coronary Artery Disease): s/p 3v CABG 2004; stents placed in [place] in 2019  Diabetes Mellitus, Type II  S/P primary angioplasty with coronary stent  Compound fracture: left leg  CABG (Coronary Artery Bypass Graft): 2004      MEDICATIONS  (STANDING):  albuterol/ipratropium for Nebulization 3 milliLiter(s) Nebulizer every 6 hours  ascorbic acid 500 milliGRAM(s) Oral daily  aspirin enteric coated 81 milliGRAM(s) Oral daily  atorvastatin 40 milliGRAM(s) Oral at bedtime  budesonide 160 MICROgram(s)/formoterol 4.5 MICROgram(s) Inhaler 2 Puff(s) Inhalation two times a day  clopidogrel Tablet 75 milliGRAM(s) Oral daily  dextrose 5%. 1000 milliLiter(s) (50 mL/Hr) IV Continuous <Continuous>  dextrose 50% Injectable 12.5 Gram(s) IV Push once  dextrose 50% Injectable 25 Gram(s) IV Push once  dextrose 50% Injectable 25 Gram(s) IV Push once  heparin  Injectable 5000 Unit(s) SubCutaneous every 12 hours  insulin lispro (HumaLOG) corrective regimen sliding scale   SubCutaneous at bedtime  insulin lispro (HumaLOG) corrective regimen sliding scale   SubCutaneous three times a day before meals  metoprolol tartrate 12.5 milliGRAM(s) Oral two times a day  montelukast 10 milliGRAM(s) Oral at bedtime  multivitamin 1 Tablet(s) Oral daily  pantoprazole    Tablet 40 milliGRAM(s) Oral before breakfast  predniSONE   Tablet 10 milliGRAM(s) Oral daily  tamsulosin 0.4 milliGRAM(s) Oral at bedtime    MEDICATIONS  (PRN):  acetaminophen   Tablet .. 650 milliGRAM(s) Oral every 6 hours PRN Temp greater or equal to 38C (100.4F), Mild Pain (1 - 3)  dextrose 40% Gel 15 Gram(s) Oral once PRN Blood Glucose LESS THAN 70 milliGRAM(s)/deciliter  glucagon  Injectable 1 milliGRAM(s) IntraMuscular once PRN Glucose LESS THAN 70 milligrams/deciliter  melatonin 5 milliGRAM(s) Oral at bedtime PRN Insomnia      OBJECTIVE    T(C): 36.7 (01-19-20 @ 11:49), Max: 37.1 (01-18-20 @ 19:12)  HR: 70 (01-19-20 @ 13:15) (70 - 90)  BP: 117/69 (01-19-20 @ 11:49) (101/67 - 132/76)  RR: 18 (01-19-20 @ 11:49) (17 - 20)  SpO2: 95% (01-19-20 @ 11:49) (95% - 100%)  Wt(kg): --  I&O's Summary    18 Jan 2020 07:01  -  19 Jan 2020 07:00  --------------------------------------------------------  IN: 1290 mL / OUT: 1840 mL / NET: -550 mL          REVIEW OF SYSTEMS:  CONSTITUTIONAL: No fever, weight loss, or fatigue  EYES: No eye pain, visual disturbances, or discharge  ENMT:   No sinus or throat pain  NECK: No pain or stiffness  RESPIRATORY: No cough, wheezing, chills or hemoptysis; No shortness of breath  CARDIOVASCULAR: No chest pain, palpitations, dizziness, or leg swelling  GASTROINTESTINAL: No abdominal pain. No nausea, vomiting; No diarrhea or constipation. No melena or hematochezia.  GENITOURINARY: No dysuria, frequency, hematuria, or incontinence  NEUROLOGICAL: No headaches, memory loss, loss of strength, numbness, or tremors  SKIN: No itching, burning, rashes, or lesions   MUSCULOSKELETAL: No joint pain or swelling; No muscle, back, or extremity pain    PHYSICAL EXAM:  Appearance: NAD. VS past 24 hrs -as above   HEENT:   Moist oral mucosa. Conjunctiva clear b/l.   Neck : supple  Respiratory: Lungs CTAB.  Gastrointestinal:  Soft, nontender. No rebound. No rigidity. BS present	  Cardiovascular: RRR ,S1S2 present  Neurologic: Non-focal. Moving all extremities.  Extremities: No edema. No erythema. No calf tenderness.  Skin: No rashes, No ecchymoses, No cyanosis.	  wounds ,skin lesions-See skin assesment flow sheet   LABS:                        10.9   6.99  )-----------( 289      ( 19 Jan 2020 07:09 )             36.4     01-19    142  |  105  |  17  ----------------------------<  136<H>  4.0   |  32<H>  |  1.10    Ca    8.6      19 Jan 2020 07:09  Phos  2.9     01-18    TPro  6.1  /  Alb  3.2<L>  /  TBili  0.4  /  DBili  x   /  AST  18  /  ALT  39  /  AlkPhos  78  01-18    CAPILLARY BLOOD GLUCOSE      POCT Blood Glucose.: 215 mg/dL (19 Jan 2020 12:02)  POCT Blood Glucose.: 275 mg/dL (19 Jan 2020 10:10)  POCT Blood Glucose.: 218 mg/dL (18 Jan 2020 21:39)  POCT Blood Glucose.: 148 mg/dL (18 Jan 2020 17:05)          Culture - Blood (collected 17 Jan 2020 21:10)  Source: .Blood Blood  Preliminary Report (18 Jan 2020 22:02):    No growth to date.    Culture - Blood (collected 17 Jan 2020 21:10)  Source: .Blood Blood  Preliminary Report (18 Jan 2020 22:02):    No growth to date.      RADIOLOGY & ADDITIONAL TESTS:   reviewed elctronically  ASSESSMENT/PLAN: 	  Chart and available morning labs /imaging are reviewed electronically , urgent issues addressed . More information  is being added upon completion of rounds , when more information is collected and management discussed with consultants , medical staff and social service/case management on the floor    75 minutes spent on this visit, 50% visit time spent in care co-ordination with other attendings and counselling patient  I have discussed care plan with patient and HCP,expressed understanding of problems treatment and their effect and side effects, alternatives in detail,I have asked if they have any questions and concerns and appropriately addressed them to best of my ability

## 2020-01-19 NOTE — PHYSICAL THERAPY INITIAL EVALUATION ADULT - GAIT TRAINING, PT EVAL
Ambulate 300ft without AD independently in 1 week. Ascend/descend 20steps with single HR in 1 week independently.

## 2020-01-19 NOTE — PHYSICAL THERAPY INITIAL EVALUATION ADULT - PERTINENT HX OF CURRENT PROBLEM, REHAB EVAL
Pt is 79 y.o. M with hx COPD dependent on CPAP/O2 at night BIBEMS secondary to sudden onset pressure like chest pain, dx with COPD exacerbation and acute hypercapnic and hypoxic respiratory failure, admitted for septic workup.

## 2020-01-19 NOTE — PHYSICAL THERAPY INITIAL EVALUATION ADULT - ADDITIONAL COMMENTS
Pt states he lives with wife in 2LH with WILLIAM with B/L rails and 2 staircases inside, one to 2nd floor and basement with HR, denies using AD although has SAC and RW, does not drive and states he was fully independent with ADL's and functional mobility, denies hx falls.

## 2020-01-19 NOTE — DISCHARGE NOTE PROVIDER - NSDCFUSCHEDAPPT_GEN_ALL_CORE_FT
YUE COTTO ; 01/27/2020 ; NPP Med Pulm [address] [facility]  YUE COTTO ; 01/27/2020 ; NPP Med Pulm [address] [facility]  YUE COTTO ; 02/22/2020 ; NPP Med SleepLab [address] [facility]  YUE COTTO ; 03/02/2020 ; NPP Surg Vasc [address] ; 03/02/2020 ; NPP Surg Vasc [address] ; 03/02/2020 ; NPP Surg Vasc [address]

## 2020-01-19 NOTE — DISCHARGE NOTE PROVIDER - PROVIDER TOKENS
PROVIDER:[TOKEN:[[id number]],FOLLOWUP:[1 week]],PROVIDER:[TOKEN:[[id number]],FOLLOWUP:[2 weeks]]

## 2020-01-19 NOTE — PROGRESS NOTE ADULT - ASSESSMENT
80 yo AAM with history of  COPD (On home O2 2L and BIPAP at while sleeping regardless of time of day), CAD (w/ 3v CABG 2004),s/p 2 recent coronary stensts in [facility] , HLD, DM2 (on Metformin), BPH, hx Hypercapnic Respiratory  Failure/Cardiac Arrest requiring intubation (6/18) ,frequent admissions in 2019, BIBEMS for sudden onset of pressure like chest pain which started at 930 am no radiation with worsening sob with exertion. Wife took bp which was normal but pulse was 160. Wife gave 3 asa to pt and by the time EMS arrived HR went to 100. Pt has been baseline otherwise no fever chills nvd abdominal pain no recent travels. Pt states chest pain resolved on arrival to ER . As per ems requiring increase in O2 to 4 L nc. Denies smoking/drinking/drug use  Diagnosed with COPD EXACERBATION AND ACUTE  HYPERCAPNIC AND HYPOXIC RESPIRATORY FAILURE ,placed on BIPAP , Seen by pulmonologist Dr Dejesus and cardiologist Dr Heath Admitted  to telemetry unit for monitoring , send 3 sets of cardiac ensymes to rule out coronary event, obtain ECHO to evaluate LVEF, cardiology consult ,continue current management, O2 supply, anticoagulation plan as per cardiology consult -continue asa ,plavix and heparin dvt prophylaxis dose

## 2020-01-19 NOTE — DISCHARGE NOTE PROVIDER - CARE PROVIDER_API CALL
Arun Dejesus)  Critical Care Medicine; Internal Medicine; Pulmonary Disease  [facility] [place] [address]  Phone: [phone]  Fax: [phone]  Follow Up Time: 1 week    Jitendra Heath)  Cardiovascular Disease; Internal Medicine  [address], [place] [id number]  Phone: [phone]  Fax: [phone]  Follow Up Time: 2 weeks

## 2020-01-19 NOTE — PROGRESS NOTE ADULT - ATTENDING COMMENTS
Seen/examined. agree with above.
Seen/examined. agree with above.
78 yo AAM with history of  COPD (On home O2 2L and BIPAP at while sleeping regardless of time of day), CAD (w/ 3v CABG 2004),s/p 2 recent coronary stensts in [facility] , HLD, DM2 (on Metformin), BPH, hx Hypercapnic Respiratory  Failure/Cardiac Arrest requiring intubation (6/18) ,frequent admissions in 2019, BIBEMS for sudden onset of pressure like chest pain which started at 930 am no radiation with worsening sob with exertion. Wife took bp which was normal but pulse was 160. Wife gave 3 asa to pt and by the time EMS arrived HR went to 100. Pt has been baseline otherwise no fever chills nvd abdominal pain no recent travels. Pt states chest pain resolved on arrival to ER . As per ems requiring increase in O2 to 4 L nc. Denies smoking/drinking/drug use  Diagnosed with COPD EXACERBATION AND ACUTE  HYPERCAPNIC AND HYPOXIC RESPIRATORY FAILURE ,placed on BIPAP , Seen by pulmonologist Dr Dejesus and cardiologist Dr Heath Admitted  to telemetry unit for monitoring , send 3 sets of cardiac ensymes to rule out coronary event, obtain ECHO to evaluate LVEF, cardiology consult ,continue current management, O2 supply, anticoagulation plan as per cardiology consult -continue asa ,plavix and heparin dvt prophylaxis dose
78 yo AAM with history of  COPD (On home O2 2L and BIPAP at while sleeping regardless of time of day), CAD (w/ 3v CABG 2004),s/p 2 recent coronary stensts in [facility] , HLD, DM2 (on Metformin), BPH, hx Hypercapnic Respiratory  Failure/Cardiac Arrest requiring intubation (6/18) ,frequent admissions in 2019, BIBEMS for sudden onset of pressure like chest pain which started at 930 am no radiation with worsening sob with exertion. Wife took bp which was normal but pulse was 160. Wife gave 3 asa to pt and by the time EMS arrived HR went to 100. Pt has been baseline otherwise no fever chills nvd abdominal pain no recent travels. Pt states chest pain resolved on arrival to ER . As per ems requiring increase in O2 to 4 L nc. Denies smoking/drinking/drug use  Diagnosed with COPD EXACERBATION AND ACUTE  HYPERCAPNIC AND HYPOXIC RESPIRATORY FAILURE ,placed on BIPAP , Seen by pulmonologist Dr Dejesus and cardiologist Dr Heath Admitted  to telemetry unit for monitoring , send 3 sets of cardiac ensymes to rule out coronary event, obtain ECHO to evaluate LVEF, cardiology consult ,continue current management, O2 supply, anticoagulation plan as per cardiology consult -continue asa ,plavix and heparin dvt prophylaxis dose

## 2020-01-19 NOTE — DISCHARGE NOTE PROVIDER - HOSPITAL COURSE
80 yo AAM with history of  COPD (On home O2 2L and BIPAP at while sleeping regardless of time of day), CAD (w/ 3v CABG 2004),s/p 2 recent coronary stensts in [facility] , HLD, DM2 (on Metformin), BPH, hx Hypercapnic Respiratory  Failure/Cardiac Arrest requiring intubation (6/18) ,frequent admissions in 2019, BIBEMS for sudden onset of pressure like chest pain which started at 930 am no radiation with worsening sob with exertion. Wife took bp which was normal but pulse was 160. Wife gave 3 asa to pt and by the time EMS arrived HR went to 100. Pt has been baseline otherwise no fever chills nvd abdominal pain no recent travels. Pt states chest pain resolved on arrival to ER . As per ems requiring increase in O2 to 4 L nc. Denies smoking/drinking/drug use  Diagnosed with COPD EXACERBATION AND ACUTE  HYPERCAPNIC AND HYPOXIC RESPIRATORY FAILURE ,placed on BIPAP , Seen by pulmonologist Dr Dejesus and cardiologist Dr Heath Admitted  to telemetry unit for monitoring , send 3 sets of cardiac ensymes to rule out coronary event, obtain ECHO to evaluate LVEF, cardiology consult ,continue current management, O2 supply, anticoagulation plan as per cardiology consult -continue asa ,plavix and heparin dvt prophylaxis dose         Problem/Plan - 1:    ·  Problem: Respiratory failure.  Plan: placed on BIPAP ,serial ABGS ,seen by pulmonologist Dr Dejesus ,chest xray to rule out pneumonia Admitted for septic workup and evaluation,send blood and urine cx,serial lactate levels,monitor vitals closley,ivfs hydration,monitor urine output and renal profile,iv abx initiated in ER.         Problem/Plan - 2:    ·  Problem: Chronic obstructive pulmonary disease, unspecified COPD type.  Plan: Admit for antibacterial managmnet , steroids,nebulised bronchodilators and pulmonology evaluation,O2 supply,serial labs and chest xrays,obtain ECHO to evaluate LVEF and rule out chf component.         Problem/Plan - 3:    ·  Problem: CAD (Coronary Artery Disease).  Plan: Admitted  to telemetry unit for monitoring , send 3 sets of cardiac ensymes to rule out coronary event, obtain ECHO to evaluate LVEF, cardiology consult ,continue current management, O2 supply, anticoagulation plan as per cardiology consult.         Problem/Plan - 4:    ·  Problem: Diabetes Mellitus, Type II.  Plan: corrective regimen sliding scale coverage.         Problem/Plan - 5:    ·  Problem: PVD (peripheral vascular disease).  Plan: continue home medications.         Problem/Plan - 6:    Problem: Hypertension. Plan: continue current management and present  medications ,seen by cardiologist ,continue metoprolol.        Problem/Plan - 7:    ·  Problem: Dyslipidemia.  Plan: continue home medications.         Problem/Plan - 8:    ·  Problem: Prophylactic measure.  Plan: Gastrointestinal stress ulcer prophylaxis and DVT prophylaxis administrated.

## 2020-01-19 NOTE — DISCHARGE NOTE PROVIDER - NSDCMRMEDTOKEN_GEN_ALL_CORE_FT
acetaminophen 325 mg oral tablet: 2 tab(s) orally every 6 hours, As needed, Temp greater or equal to 38C (100.4F), Mild Pain (1 - 3)  aspirin 81 mg oral delayed release tablet: 1 tab(s) orally once a day  atorvastatin 40 mg oral tablet: 1 tab(s) orally once a day  Breo Ellipta 100 mcg-25 mcg/inh inhalation powder: 1 puff(s) inhaled once a day  clopidogrel 75 mg oral tablet: 1 tab(s) orally once a day  CoQ10 300 mg oral capsule: 1 cap(s) orally once a day  Daliresp 500 mcg oral tablet: 1 tab(s) orally once a day  Fish Oil oral capsule: 1 cap(s) orally once a day  glipiZIDE 2.5 mg oral tablet, extended release: 1 tab(s) orally 2 times a day  Incruse Ellipta 62.5 mcg/inh inhalation powder: 1 puff(s) inhaled once a day  metFORMIN 1000 mg oral tablet: 1 tab(s) orally 2 times a day  Metoprolol Tartrate 25 mg oral tablet: 0.5 tab(s) orally 2 times a day  montelukast 10 mg oral tablet: 1 tab(s) orally once a day (at bedtime)  Multiple Vitamins oral tablet: 1 tab(s) orally once a day  pantoprazole 40 mg oral delayed release tablet: 1 tab(s) orally once a day (before a meal)  predniSONE 10 mg oral tablet: 1 tab(s) orally once a day  tamsulosin 0.4 mg oral capsule: 1 cap(s) orally once a day (at bedtime)  valsartan 80 mg oral tablet: 1 tab(s) orally once a day. Home dose and frequency   Ventolin HFA 90 mcg/inh inhalation aerosol: 2 puff(s) inhaled 4 times a day, As Needed  Vitamin B Complex 100: 1 tab(s) orally once a day  Vitamin C 500 mg oral tablet: 1 tab(s) orally once a day

## 2020-01-19 NOTE — DISCHARGE NOTE NURSING/CASE MANAGEMENT/SOCIAL WORK - PATIENT PORTAL LINK FT
You can access the FollowMyHealth Patient Portal offered by [facility] by registering at the following website: [email or web address]. By joining [employer]’s FollowMyHealth portal, you will also be able to view your health information using other applications (apps) compatible with our system.

## 2020-01-19 NOTE — PHYSICAL THERAPY INITIAL EVALUATION ADULT - CRITERIA FOR SKILLED THERAPEUTIC INTERVENTIONS
rehab potential/anticipated equipment needs at discharge/anticipated discharge recommendation/impairments found/predicted duration of therapy intervention/therapy frequency

## 2020-01-19 NOTE — PHARMACOTHERAPY INTERVENTION NOTE - COMMENTS
Discussed COPD exacerbation, monitoring and prevention. Reviewed home inhalers, Breo and Incruse Ellipta, Ventolin Inhaler. Reviewed inhaler technique. Patient has spacer with whistle indicator. Suggested spacer be used.        Time spent 30 minutes

## 2020-01-19 NOTE — DISCHARGE NOTE PROVIDER - NSDCCPCAREPLAN_GEN_ALL_CORE_FT
PRINCIPAL DISCHARGE DIAGNOSIS  Diagnosis: Chronic obstructive pulmonary disease, unspecified COPD type  Assessment and Plan of Treatment:       SECONDARY DISCHARGE DIAGNOSES  Diagnosis: PSVT (paroxysmal supraventricular tachycardia)  Assessment and Plan of Treatment:     Diagnosis: Dyslipidemia  Assessment and Plan of Treatment: Dyslipidemia    Diagnosis: PVD (peripheral vascular disease)  Assessment and Plan of Treatment: PVD (peripheral vascular disease)    Diagnosis: Diabetes Mellitus, Type II  Assessment and Plan of Treatment: Diabetes Mellitus, Type II    Diagnosis: CAD (Coronary Artery Disease)  Assessment and Plan of Treatment: CAD (Coronary Artery Disease)    Diagnosis: Hypertension  Assessment and Plan of Treatment: Hypertension

## 2020-01-19 NOTE — PROGRESS NOTE ADULT - SUBJECTIVE AND OBJECTIVE BOX
[facility] -- Saud Aguilar, Dani, Génesis, Carroll Haney Savella  Office # [phone]      Follow Up:  SVT, CAD    Subjective/Observations: Seen and examined.  Pt resting comfortably, sleeping with bipap in place sitting up in bed arouses easily.  No c/o cp, sob or palpitations.  NAD.        REVIEW OF SYSTEMS: All other review of systems is negative unless indicated above    PAST MEDICAL & SURGICAL HISTORY:  PVD (peripheral vascular disease)  Hypertension  COPD (chronic obstructive pulmonary disease): on 2L at home and BiPAP at night; intubated 6/18  Osteomyelitis  Dyslipidemia  CAD (Coronary Artery Disease): s/p 3v CABG 2004; st[facility]  Diabetes Mellitus, Type II  S/P primary angioplasty with coronary stent  Compound fracture: left leg  CABG (Coronary Artery Bypass Graft): 2004      MEDICATIONS  (STANDING):  albuterol/ipratropium for Nebulization 3 milliLiter(s) Nebulizer every 6 hours  ascorbic acid 500 milliGRAM(s) Oral daily  aspirin enteric coated 81 milliGRAM(s) Oral daily  atorvastatin 40 milliGRAM(s) Oral at bedtime  budesonide 160 MICROgram(s)/formoterol 4.5 MICROgram(s) Inhaler 2 Puff(s) Inhalation two times a day  clopidogrel Tablet 75 milliGRAM(s) Oral daily  dextrose 5%. 1000 milliLiter(s) (50 mL/Hr) IV Continuous <Continuous>  dextrose 50% Injectable 12.5 Gram(s) IV Push once  dextrose 50% Injectable 25 Gram(s) IV Push once  dextrose 50% Injectable 25 Gram(s) IV Push once  heparin  Injectable 5000 Unit(s) SubCutaneous every 12 hours  insulin lispro (HumaLOG) corrective regimen sliding scale   SubCutaneous at bedtime  insulin lispro (HumaLOG) corrective regimen sliding scale   SubCutaneous three times a day before meals  metoprolol tartrate 12.5 milliGRAM(s) Oral two times a day  montelukast 10 milliGRAM(s) Oral at bedtime  multivitamin 1 Tablet(s) Oral daily  pantoprazole    Tablet 40 milliGRAM(s) Oral before breakfast  predniSONE   Tablet 10 milliGRAM(s) Oral daily  tamsulosin 0.4 milliGRAM(s) Oral at bedtime    MEDICATIONS  (PRN):  acetaminophen   Tablet .. 650 milliGRAM(s) Oral every 6 hours PRN Temp greater or equal to 38C (100.4F), Mild Pain (1 - 3)  dextrose 40% Gel 15 Gram(s) Oral once PRN Blood Glucose LESS THAN 70 milliGRAM(s)/deciliter  glucagon  Injectable 1 milliGRAM(s) IntraMuscular once PRN Glucose LESS THAN 70 milligrams/deciliter  melatonin 5 milliGRAM(s) Oral at bedtime PRN Insomnia      Allergies    No Known Allergies    Intolerances    shellfish (Nausea)          Vital Signs Last 24 Hrs  T(C): 36.5 (19 Jan 2020 04:59), Max: 37.1 (18 Jan 2020 19:12)  T(F): 97.7 (19 Jan 2020 04:59), Max: 98.8 (18 Jan 2020 19:12)  HR: 84 (19 Jan 2020 07:23) (72 - 102)  BP: 113/70 (19 Jan 2020 04:59) (113/70 - 132/76)  BP(mean): --  RR: 18 (19 Jan 2020 04:59) (17 - 20)  SpO2: 96% (19 Jan 2020 07:24) (96% - 100%)    I&O's Summary    18 Jan 2020 07:01  -  19 Jan 2020 07:00  --------------------------------------------------------  IN: 1290 mL / OUT: 1840 mL / NET: -550 mL          PHYSICAL EXAM:  TELE: NSR with PAT brief AF 8- 4 beats  Constitutional: NAD, sleeping arouses easily with bipap in place, well-developed  HEENT: Moist Mucous Membranes, Anicteric  Pulmonary: Non-labored, breath sounds are decreased in bases bilaterally with expiratory wheezing in b/l lung fields  Cardiovascular: Regular, S1 and S2, No murmurs, rubs, gallops or clicks  Gastrointestinal: Bowel Sounds present, soft, nontender.   Lymph: +1 pretibial lower extremity edema. No lymphadenopathy.  Skin: No visible rashes or ulcers.  Psych:  Mood & affect appropriate    LABS: All Labs Reviewed:                        10.9   6.99  )-----------( 289      ( 19 Jan 2020 07:09 )             36.4                         10.8   8.33  )-----------( 286      ( 18 Jan 2020 08:00 )             35.8                         11.6   7.44  )-----------( 291      ( 17 Jan 2020 13:47 )             39.6     18 Jan 2020 08:00    141    |  104    |  14     ----------------------------<  182    4.6     |  31     |  1.00   17 Jan 2020 13:47    143    |  104    |  12     ----------------------------<  147    3.9     |  32     |  1.20     Ca    9.0        18 Jan 2020 08:00  Ca    8.7        17 Jan 2020 13:47  Phos  2.9       18 Jan 2020 08:00    TPro  6.1    /  Alb  3.2    /  TBili  0.4    /  DBili  x      /  AST  18     /  ALT  39     /  AlkPhos  78     18 Jan 2020 08:00  TPro  6.5    /  Alb  3.5    /  TBili  0.3    /  DBili  x      /  AST  30     /  ALT  46     /  AlkPhos  101    17 Jan 2020 13:47    PT/INR - ( 17 Jan 2020 13:47 )   PT: 10.4 sec;   INR: 0.92 ratio         PTT - ( 17 Jan 2020 13:47 )  PTT:36.4 sec  CARDIAC MARKERS ( 18 Jan 2020 08:00 )  .071 ng/mL / x     / x     / x     / x      CARDIAC MARKERS ( 17 Jan 2020 13:47 )  .045 ng/mL / x     / x     / x     / x        < from: 12 Lead ECG (12.05.19 @ 12:19) >  Ventricular Rate 93 BPM    Atrial Rate 93 BPM    P-R Interval 146 ms    QRS Duration 80 ms    Q-T Interval 346 ms    QTC Calculation(Bezet) 430 ms    P Axis 77 degrees    R Axis 85 degrees    T Axis 61 degrees    Diagnosis Line Sinus rhythm with premature atrial complexes  Otherwise normal ECG  When compared with ECG of 15-NOV-2019 17:53, (Unconfirmed)  premature atrial complexes are now present  Confirmed by Jonas Capps MD ([id number]) on 12/6/2019 2:12:14 PM    < end of copied text >      < from: TTE Echo Doppler w/o Cont (03.12.19 @ 20:42) >   EXAM:  ECHO TTE WO CON COMP W DOPPLR         PROCEDURE DATE:  03/12/2019        INTERPRETATION:  Ordering Physician: MARTÍN CARRASQUILLO [id number]    Indication: Shortness of breath    Study Quality: Technically difficult   A complete echocardiographic study was performed utilizing standard   protocol including spectral and color Doppler in all echocardiographic   windows.    Height: 175 cm  Weight: 113 kg  BSA: 2.2 sq m  Blood Pressure: 103/61    MEASUREMENTS  IVS: 1.5cm  PWT: 1.1cm  LA: 3.1cm  AO: 3.3cm  LVIDd: 4.2cm  LVIDs: 3.3cm    RVSP: 29mmHg    FINDINGS  Left Ventricle: The endocardium is not well-visualized. In limited views,   the LV function appears to be preserved with an estimated EF of 55%.   There is paradoxical motion of the septum in the setting of prior cardiac   surgery.  Aortic Valve: Calcified aortic valve with normal opening. Trace aortic   regurgitation  Mitral Valve: Thickened and calcified mitral valve leaflets with trace to   mild mitral regurgitation  Tricuspid Valve: Grossly normal tricuspid valve. Mild tricuspid   regurgitation.  Pulmonic Valve: Not well-visualized.  Left Atrium: Grossly normal. An interatrial septal aneurysm is noted   without obvious color flow across it  Right Ventricle: In limited views, grossly normal RV size.  Right Atrium: Grossly normal  Diastolic Function: Doppler evidence of rate 1 diastolic dysfunction  Pericardium/Pleura: No significant pericardial effusion.  Hemodynamics: The pulmonary artery systolic pressure is estimated to be   29 mmHg, assuming the right atrial pressure is equal to 8 mmHg,   consistent with normal pulmonary pressures.    CONCLUSIONS:  1. Technically difficult and limited study  2. The endocardium is not well-visualized. In limited views, the LV   function appears to be preserved with an estimated EF of 55%. There is   paradoxical motion of the septum in the setting of prior cardiac surgery.  3. Calcified aortic valve with normal opening. Trace aortic regurgitation  4. Thickened and calcified mitral valve leaflets with trace to mild   mitral regurgitation  5. Doppler evidence of rate 1 diastolic dysfunction  6. The interatrial septum appears aneurysmal  7. No significant pericardial effusion.                      REJI VILA   This document has been electronically signed. Mar 14 2019 10:16AM                < end of copied text >     < from: US Duplex Venous Lower Ext Complete, Bilateral (01.17.20 @ 18:25) >  EXAM:  US DPLX LWR EXT VEINS COMPL BI                            PROCEDURE DATE:  01/17/2020          INTERPRETATION:  CLINICAL INFORMATION: Bilateral lower extremity pain of unknown severity or duration. Evaluate for DVT bilaterally. Chest pain ofunknown severity or duration. No additional information is provided.    COMPARISON: 11/15/2019    TECHNIQUE: Duplex sonography of the BILATERAL LOWER extremity veins with color and spectral Doppler, with and without compression.      FINDINGS:    There is normal compressibility of the bilateral common femoral, femoral, popliteal, and proximal posterior tibial veins.     Doppler examination shows normal spontaneous and phasic flow.      IMPRESSION:     No evidence of deep venous thrombosis in either lower extremity.                      ARISTIDES WOLF M.D., ATTENDING RADIOLOGIST  This document has been electronically signed. Jan 17 2020  6:43PM    < end of copied text >      < from: Xray Chest 1 View AP/PA (01.17.20 @ 13:20) >  EXAM:  XR CHEST AP OR PA 1V                            PROCEDURE DATE:  01/17/2020          INTERPRETATION:  Short of breath.    AP chest. Prior 12/5/2019.    Status post median sternotomy. Normal heart mediastinum. Calcified aortic arch. Symmetrically hyperinflated emphysematous lungs. No consolidation pneumothorax or effusion.    Impression: Hyperinflated lungs. No active infiltrates. Stable exam                RODRIGO FAITH M.D., ATTENDING RADIOLOGIST  This document has been electronicallysigned. Jan 17 2020  1:36PM    < end of copied text >

## 2020-01-27 ENCOUNTER — APPOINTMENT (OUTPATIENT)
Dept: PULMONOLOGY | Facility: CLINIC | Age: 80
End: 2020-01-27
Payer: MEDICARE

## 2020-01-27 VITALS
OXYGEN SATURATION: 93 % | HEART RATE: 91 BPM | BODY MASS INDEX: 30.35 KG/M2 | TEMPERATURE: 97.2 F | SYSTOLIC BLOOD PRESSURE: 119 MMHG | HEIGHT: 70 IN | WEIGHT: 212 LBS | RESPIRATION RATE: 18 BRPM | DIASTOLIC BLOOD PRESSURE: 77 MMHG

## 2020-01-27 PROCEDURE — 99214 OFFICE O/P EST MOD 30 MIN: CPT

## 2020-01-27 RX ORDER — ROFLUMILAST 500 UG/1
500 TABLET ORAL DAILY
Qty: 30 | Refills: 5 | Status: DISCONTINUED | COMMUNITY
Start: 2019-08-25 | End: 2020-01-27

## 2020-01-27 NOTE — PROCEDURE
[FreeTextEntry1] : First Fasenra injection administered to LUE SQ 30mg/ml LOT [id number] EXP 08/21. Tolerated injected well. Educated provided both written and verbal regarding potential side effects of medication. \par RTC in 4 weeks for next injection

## 2020-01-27 NOTE — PHYSICAL EXAM
[General Appearance - Well Developed] : well developed [Normal Appearance] : normal appearance [Well Groomed] : well groomed [General Appearance - Well Nourished] : well nourished [General Appearance - In No Acute Distress] : no acute distress [Normal Conjunctiva] : the conjunctiva exhibited no abnormalities [Normal Oropharynx] : normal oropharynx [Neck Appearance] : the appearance of the neck was normal [Neck Cervical Mass (___cm)] : no neck mass was observed [Jugular Venous Distention Increased] : there was no jugular-venous distention [Heart Rate And Rhythm] : heart rate and rhythm were normal [Heart Sounds] : normal S1 and S2 [Murmurs] : no murmurs present [Arterial Pulses Normal] : the arterial pulses were normal [Respiration, Rhythm And Depth] : normal respiratory rhythm and effort [Exaggerated Use Of Accessory Muscles For Inspiration] : no accessory muscle use [Abdomen Soft] : soft [Abdomen Tenderness] : non-tender [Abnormal Walk] : normal gait [Nail Clubbing] : no clubbing of the fingernails [Cyanosis, Localized] : no localized cyanosis [Cranial Nerves] : cranial nerves 2-12 were intact [Motor Exam] : the motor exam was normal [Oriented To Time, Place, And Person] : oriented to person, place, and time [Affect] : the affect was normal [Mood] : the mood was normal [Skin Color & Pigmentation] : normal skin color and pigmentation [] : no rash [Skin Lesions] : no skin lesions [FreeTextEntry1] : decreased air entry bilaterally; end-inspiratory squeaks auscultated anteriorly; no wheezing auscultated

## 2020-01-27 NOTE — ASSESSMENT
[FreeTextEntry1] : Mr. Donohue is an 79-year-old male with a history of Asthma-COPD Overlap Syndrome, chronic hypoxemic and hypercapnic respiratory failure on home oxygen & nocturnal BiPAP, history of cardiac arrest due to respiratory failure in 2018, former smoker, HTN, HLD, DM2 (not on insulin, A1C 6.9 in Jan 2020), CAD s/p 3V-CABG (2004) and PCI (Oct 2019), PVD s/p bilateral LE stents (most recently L iliac, SFA, & popliteal stents Nov 2019) on plavix, BPH, and left femur fracture with OM s/p multiple surgeries who presents for follow-up of his chronic lung disease. He has exacerbations requiring ED visit, hospitalization, or ICU stay at least 1-2 times/month despite maximal therapy, now improved.\par \par 1. Severe COPD (GOLD 4D):\par - PFTs (Dec 2019) with severe obstruction with bronchodilator response consistent with bronchial hyperreactivity, normal TLC but markedly elevated RV (206%, 4.83 liters) consistent with air trapping. There is severely reduced diffusing capacity\par - Given bronchial hyperreactivity and peripheral eosinophilia, I suspect Asthma-COPD Overlap Syndrome\par - CBC with diff with absolute eos of 180, but he is on prednisone. IgE elevated to 118, and [place] allergen profile positive for house dust. Aspergillus IgE negative. Alpha-1 antitrypsin normal and HIV negative\par - Continue with Breo Ellipta 200-25 mcg once daily (rinse after use) + Incruse Ellipta 62.5 mcg daily\par - Continue montelukast 10 mg at bedtime\par - Continue prednisone 10 mg daily, will start to taper after Benralizumab is started. A1C is 6.9 Jan 2020\par - Ventolin prn with spacer (previously provided)\par - Discontinue roflumilast given no chronic bronchitis symptoms\par - First dose Benralizumab today, applying for co-pay assistance for next injections\par - Pulm rehab evaluation, does not want to continue at [place]\par - Recommend daily regular exercise with bike/treadmill at home at least 30 minutes twice daily\par - Continue breathing exercises and low carbohydrate diet \par - Reconsider BLVR if clinical improvement with Benralizumab and improved PCO2 (need PCO2<50). He has severe air trapping (%). Consider thoracic surgery evaluation\par \par 2. Chronic hypoxemia and hypercapnic respiratory failure:\par - Likely due to COPD, although interestingly A-a gradient on ABG on room air was normal (I suspect that he did not have enough time off oxygen prior to ABG)\par - Continue supplemental oxygen with nasal cannula 2-3 LPM with exercise. Saturation currently is 93% RA at rest\par - BiPAP every night, IPAP 18, EPAP 8\par - Split Diagnostic PSG with mandatory transcutaneous CO2 monitoring and BiPAP titration scheduled for Feb\par - CXR (Jan 2020) with clear lungs, evidence of hyperinflation, no pneumonia, pneumothorax, or pleural effusion\par - CTPA (Oct 2019) with centrilobular emphysema, scattered areas of linear scarring in both lung bases, lingula and RML, and stable punctate 1 mm calcified nodule anteriorly in RUL. There are areas of mucus impaction of the distal airways to the lower lobes. No pleural effusion. No lymphadenopathy. No pulmonary embolism.\par - 2D-echo (March 2019) with diastolic dysfunction, no significant valvular disease, and normal RVSP\par - 6MWD is 132 meters (but he had to use the bathroom). Exercise oximetry required 2 LPM with exercise\par \par 3. Improved leg edema:\par - Unclear etiology, possible related to chronic steroids\par - Keep legs elevated\par - He has a history of diastolic dysfunction\par - Hold diuresis given improvement\par \par 4. Upper airway cough syndrome:\par - Continue fluticasone nasal spray, 1-2 sprays per nostril twice daily\par \par 5. HCM:\par - Up-to-date with influenza and pneumococcal vaccinations\par - Next benralizumab injection in 1 month, then follow-up in March

## 2020-01-27 NOTE — HISTORY OF PRESENT ILLNESS
[FreeTextEntry1] : Mr. Donohue is an 79-year-old male with a history of Asthma-COPD Overlap Syndrome, former smoker, chronic hypoxemic and hypercapnic respiratory failure on home oxygen & nocturnal BiPAP, history of cardiac arrest due to respiratory failure in 2018, HTN, HLD, DM2 (not on insulin), CAD s/p 3V-CABG (2004) and PCI (Oct 2019), PVD s/p bilateral LE stents (most recently Nov 2019) on plavix, BPH, and left femur fracture with OM s/p multiple surgeries who presents for follow-up of his chronic lung disease.\par \par He was recently hospitalized at  for SVT with CP that self-resolved. Echo was stable with diastolic dysfunction and no pulmonary hypertension. ABG with compensated stable hypercapnia. Dyspnea has been stable. He has been going to pulmonary rehab, but on Friday he was fed up and frustrated and stormed out because he felt that the pulmonary rehab was childish and only for very old debilitated people. Willing to re-try our pulm rehab. He is adherent with Breo Ellipta, Inruse Ellipta, Montelukast, Flonase, and Daliresp. He takes Ventolin about twice daily. He remains on prednisone 10 mg daily. He is due for Benralizumab first injection today given peripheral eosinophilia, elevated IgE, and steroid dependence. He wears his BiPAP (IPAP 18 EPAP 8) every night from 10pm until 7am, and wears supplemental oxygen with nasal cannula 2-3 LPM during the day. Still pending diagnostic PSG/BiPAP titration with mandatory transcutaneous CO2 monitoring, scheduled for February. He is up-to-date with influenza and pneumococcal vaccinations.\par \par PFTs (Dec 2019) with very severe obstruction with bronchodilator response (NOT reversible). TLC is normal, but RV is increased to 206%. Diffusion capacity is severely reduced.\par \par ABG (Jan 2020) with compensated hypercapnia. He also has mild hypoxemia and A-a gradient of 13 (although this may be underestimated if not enough time was given off oxygen prior to ABG). \par \par Exercise oximetry (Jan 2020) with hypoxemia at rest to 86% requiring 2L NC. Oxygen requirement with exercise also 2 LPM, but he was only able to exercise for 4 minutes prior to needing to use the bathroom.\par \par 6MWD (Jan 2020) is 132 meters.\par \par Last CXR in December 2019 with clear lungs, evidence of hyperinflation, no pneumonia, pneumothorax, or pleural effusion.\par \par Last CT chest October 2019 with centrilobular emphysema, scattered areas of linear scarring in \par both lung bases, lingula and RML, and stable punctate 1 mm calcified nodule anteriorly in RUL. There are areas of mucus impaction of the distal airways to the lower lobes. No pleural effusion. No lymphadenopathy. No pulmonary embolism.\par \par Last 2D-echo in January 2020 with diastolic dysfunction, no significant valvular disease, estimated RVSP of 15, which is normal.\par \par Regarding his smoking history, he quit smoking in the 1980s, used to smoke 1 ppd for 30 years. Was smoking marijuana about 3-4 joints 3x/week until 2017. No alcohol use. No other drug use

## 2020-01-27 NOTE — REVIEW OF SYSTEMS
[FreeTextEntry2] : rhinorrhea [Fatigue] : fatigue [Cough] : cough [Dyspnea] : dyspnea [Wheezing] : wheezing [Hypertension] : ~T hypertension [Diabetes] : diabetes mellitus [Negative] : Sleep Disorder [Fever] : no fever [Chills] : no chills

## 2020-01-27 NOTE — PROCEDURE
[FreeTextEntry1] : First Fasenra injection administered to LUE SQ 30mg/ml LOT [id number] EXP 08/21. Tolerated injected well. Educated provided both written and verbal regarding potential side effects of medication. \par RTC in 4 weeks for next injection

## 2020-01-27 NOTE — ASSESSMENT
[FreeTextEntry1] : Mr. Donohue is an 79-year-old male with a history of Asthma-COPD Overlap Syndrome, chronic hypoxemic and hypercapnic respiratory failure on home oxygen & nocturnal BiPAP, history of cardiac arrest due to respiratory failure in 2018, former smoker, HTN, HLD, DM2 (not on insulin, A1C 6.9 in Jan 2020), CAD s/p 3V-CABG (2004) and PCI (Oct 2019), PVD s/p bilateral LE stents (most recently L iliac, SFA, & popliteal stents Nov 2019) on plavix, BPH, and left femur fracture with OM s/p multiple surgeries who presents for follow-up of his chronic lung disease. He has exacerbations requiring ED visit, hospitalization, or ICU stay at least 1-2 times/month despite maximal therapy, now improved.\par \par 1. Severe COPD (GOLD 4D):\par - PFTs (Dec 2019) with severe obstruction with bronchodilator response consistent with bronchial hyperreactivity, normal TLC but markedly elevated RV (206%, 4.83 liters) consistent with air trapping. There is severely reduced diffusing capacity\par - Given bronchial hyperreactivity and peripheral eosinophilia, I suspect Asthma-COPD Overlap Syndrome\par - CBC with diff with absolute eos of 180, but he is on prednisone. IgE elevated to 118, and [place] allergen profile positive for house dust. Aspergillus IgE negative. Alpha-1 antitrypsin normal and HIV negative\par - Continue with Breo Ellipta 200-25 mcg once daily (rinse after use) + Incruse Ellipta 62.5 mcg daily\par - Continue montelukast 10 mg at bedtime\par - Continue prednisone 10 mg daily, will start to taper after Benralizumab is started. A1C is 6.9 Jan 2020\par - Ventolin prn with spacer (previously provided)\par - Discontinue roflumilast given no chronic bronchitis symptoms\par - First dose Benralizumab today, applying for co-pay assistance for next injections\par - Pulm rehab evaluation, does not want to continue at [place]\par - Recommend daily regular exercise with bike/treadmill at home at least 30 minutes twice daily\par - Continue breathing exercises and low carbohydrate diet \par - Reconsider BLVR if clinical improvement with Benralizumab and improved PCO2 (need PCO2<50). He has severe air trapping (%). Consider thoracic surgery evaluation\par \par 2. Chronic hypoxemia and hypercapnic respiratory failure:\par - Likely due to COPD, although interestingly A-a gradient on ABG on room air was normal (I suspect that he did not have enough time off oxygen prior to ABG)\par - Continue supplemental oxygen with nasal cannula 2-3 LPM with exercise. Saturation currently is 93% RA at rest\par - BiPAP every night, IPAP 18, EPAP 8\par - Split Diagnostic PSG with mandatory transcutaneous CO2 monitoring and BiPAP titration scheduled for Feb\par - CXR (Jan 2020) with clear lungs, evidence of hyperinflation, no pneumonia, pneumothorax, or pleural effusion\par - CTPA (Oct 2019) with centrilobular emphysema, scattered areas of linear scarring in both lung bases, lingula and RML, and stable punctate 1 mm calcified nodule anteriorly in RUL. There are areas of mucus impaction of the distal airways to the lower lobes. No pleural effusion. No lymphadenopathy. No pulmonary embolism.\par - 2D-echo (March 2019) with diastolic dysfunction, no significant valvular disease, and normal RVSP\par - 6MWD is 132 meters (but he had to use the bathroom). Exercise oximetry required 2 LPM with exercise\par \par 3. Improved leg edema:\par - Unclear etiology, possible related to chronic steroids\par - Keep legs elevated\par - He has a history of diastolic dysfunction\par - Hold diuresis given improvement\par \par 4. Upper airway cough syndrome:\par - Continue fluticasone nasal spray, 1-2 sprays per nostril twice daily\par \par 5. HCM:\par - Up-to-date with influenza and pneumococcal vaccinations\par - Next benralizumab injection in 1 month, then follow-up in March

## 2020-01-27 NOTE — ED ADULT TRIAGE NOTE - NS ED NURSE BANDS TYPE
Name band; [FreeTextEntry1] : macy, med renewal  [de-identified] : Pt concerned about decreased ROM, and function in hands. Pt has long standing history of arthritis and carpel tunnel. denies serious pain. [FreeTextEntry8] : Pt presents for bw,med renewal.\par

## 2020-01-31 RX ORDER — BENRALIZUMAB 30 MG/ML
30 INJECTION, SOLUTION SUBCUTANEOUS
Qty: 1 | Refills: 3 | Status: DISCONTINUED | COMMUNITY
Start: 2019-12-19 | End: 2020-01-31

## 2020-01-31 RX ORDER — BENRALIZUMAB 30 MG/ML
30 INJECTION, SOLUTION SUBCUTANEOUS
Qty: 1 | Refills: 2 | Status: DISCONTINUED | COMMUNITY
Start: 2019-12-19 | End: 2020-01-31

## 2020-02-21 ENCOUNTER — FORM ENCOUNTER (OUTPATIENT)
Age: 80
End: 2020-02-21

## 2020-02-22 ENCOUNTER — OUTPATIENT (OUTPATIENT)
Dept: OUTPATIENT SERVICES | Facility: HOSPITAL | Age: 80
LOS: 1 days | End: 2020-02-22
Payer: COMMERCIAL

## 2020-02-22 ENCOUNTER — APPOINTMENT (OUTPATIENT)
Dept: SLEEP CENTER | Facility: CLINIC | Age: 80
End: 2020-02-22
Payer: MEDICARE

## 2020-02-22 DIAGNOSIS — Z95.5 PRESENCE OF CORONARY ANGIOPLASTY IMPLANT AND GRAFT: Chronic | ICD-10-CM

## 2020-02-22 DIAGNOSIS — T14.8XXA OTHER INJURY OF UNSPECIFIED BODY REGION, INITIAL ENCOUNTER: Chronic | ICD-10-CM

## 2020-02-22 PROCEDURE — 95811 POLYSOM 6/>YRS CPAP 4/> PARM: CPT | Mod: 26

## 2020-02-22 PROCEDURE — 95811 POLYSOM 6/>YRS CPAP 4/> PARM: CPT

## 2020-02-24 ENCOUNTER — APPOINTMENT (OUTPATIENT)
Dept: PULMONOLOGY | Facility: CLINIC | Age: 80
End: 2020-02-24

## 2020-02-24 ENCOUNTER — APPOINTMENT (OUTPATIENT)
Dept: PULMONOLOGY | Facility: CLINIC | Age: 80
End: 2020-02-24
Payer: MEDICARE

## 2020-02-24 VITALS
BODY MASS INDEX: 31.5 KG/M2 | RESPIRATION RATE: 18 BRPM | WEIGHT: 220 LBS | DIASTOLIC BLOOD PRESSURE: 80 MMHG | SYSTOLIC BLOOD PRESSURE: 163 MMHG | HEART RATE: 102 BPM | TEMPERATURE: 97.3 F | HEIGHT: 70 IN | OXYGEN SATURATION: 93 %

## 2020-02-24 DIAGNOSIS — G47.33 OBSTRUCTIVE SLEEP APNEA (ADULT) (PEDIATRIC): ICD-10-CM

## 2020-02-24 PROCEDURE — 96401 CHEMO ANTI-NEOPL SQ/IM: CPT

## 2020-02-24 RX ORDER — MEPOLIZUMAB 100 MG/ML
100 INJECTION, SOLUTION SUBCUTANEOUS
Refills: 0 | Status: COMPLETED | OUTPATIENT
Start: 2020-02-24

## 2020-02-24 RX ORDER — EPINEPHRINE 0.3 MG/.3ML
0.3 INJECTION INTRAMUSCULAR
Qty: 1 | Refills: 3 | Status: ACTIVE | COMMUNITY
Start: 2020-02-24 | End: 1900-01-01

## 2020-02-24 RX ADMIN — MEPOLIZUMAB 0 MG/ML: 100 INJECTION, SOLUTION SUBCUTANEOUS at 00:00

## 2020-02-24 NOTE — PHYSICAL EXAM
[Normal Appearance] : normal appearance [General Appearance - Well Developed] : well developed [General Appearance - Well Nourished] : well nourished [Well Groomed] : well groomed [General Appearance - In No Acute Distress] : no acute distress [Normal Conjunctiva] : the conjunctiva exhibited no abnormalities [Normal Oropharynx] : normal oropharynx [Neck Appearance] : the appearance of the neck was normal [Neck Cervical Mass (___cm)] : no neck mass was observed [Jugular Venous Distention Increased] : there was no jugular-venous distention [Heart Rate And Rhythm] : heart rate and rhythm were normal [Heart Sounds] : normal S1 and S2 [Arterial Pulses Normal] : the arterial pulses were normal [Murmurs] : no murmurs present [Exaggerated Use Of Accessory Muscles For Inspiration] : no accessory muscle use [Respiration, Rhythm And Depth] : normal respiratory rhythm and effort [Abdomen Tenderness] : non-tender [FreeTextEntry1] : decreased air entry bilaterally; end-inspiratory squeaks auscultated anteriorly; no wheezing auscultated [Abdomen Soft] : soft [Nail Clubbing] : no clubbing of the fingernails [Abnormal Walk] : normal gait [Cyanosis, Localized] : no localized cyanosis [Cranial Nerves] : cranial nerves 2-12 were intact [Motor Exam] : the motor exam was normal [Oriented To Time, Place, And Person] : oriented to person, place, and time [Mood] : the mood was normal [Affect] : the affect was normal [] : no rash [Skin Color & Pigmentation] : normal skin color and pigmentation [Skin Lesions] : no skin lesions

## 2020-02-24 NOTE — PROCEDURE
[FreeTextEntry1] : Nucala injection administered and tolerated well. Lot [id number] 06/2021\par \par 100mg administered under NP guidance in [facility]\par \par Patient was observed for 30 minutes following injection for hypersensitivity reaction, per office protocol.\par \par Spent 30 minutes discussing and educating patient on rationale, storage, administration, anticipated effects, adverse effects, and resources for Nucala with patient. Instruction in administration provided via teachback. Reinforced availability of [facility] to [facility] and advised patient to make use of nursing service that they provide. Phone number and materials provided for [facility] as well as specialty pharmacy.

## 2020-02-24 NOTE — REVIEW OF SYSTEMS
[Fever] : no fever [Chills] : no chills [Fatigue] : fatigue [Cough] : cough [Hypertension] : ~T hypertension [Wheezing] : wheezing [Dyspnea] : dyspnea [Diabetes] : diabetes mellitus [Negative] : Sleep Disorder

## 2020-02-24 NOTE — ASSESSMENT
[FreeTextEntry1] : 1. Severe Asthma/COPD:\par - Continue with Breo Ellipta 200-25 mcg once daily (rinse after use) + Incruse Ellipta 62.5 mcg daily\par - Continue montelukast 10 mg at bedtime\par - Continue prednisone 10 mg daily, will start to taper after Benralizumab is started. A1C is 6.9 Jan 2020\par - Ventolin prn with spacer (previously provided)\par - First dose Nucala today, tolerated well\par

## 2020-03-02 ENCOUNTER — APPOINTMENT (OUTPATIENT)
Dept: VASCULAR SURGERY | Facility: CLINIC | Age: 80
End: 2020-03-02
Payer: MEDICARE

## 2020-03-02 PROCEDURE — 93979 VASCULAR STUDY: CPT

## 2020-03-02 PROCEDURE — 93925 LOWER EXTREMITY STUDY: CPT

## 2020-03-05 ENCOUNTER — RX RENEWAL (OUTPATIENT)
Age: 80
End: 2020-03-05

## 2020-03-07 ENCOUNTER — INPATIENT (INPATIENT)
Facility: HOSPITAL | Age: 80
LOS: 1 days | Discharge: HOME | DRG: 189 | End: 2020-03-09
Attending: FAMILY MEDICINE | Admitting: FAMILY MEDICINE
Payer: COMMERCIAL

## 2020-03-07 VITALS
RESPIRATION RATE: 26 BRPM | HEIGHT: 71 IN | OXYGEN SATURATION: 98 % | WEIGHT: 222.01 LBS | HEART RATE: 149 BPM | DIASTOLIC BLOOD PRESSURE: 63 MMHG | SYSTOLIC BLOOD PRESSURE: 127 MMHG

## 2020-03-07 DIAGNOSIS — J45.909 UNSPECIFIED ASTHMA, UNCOMPLICATED: ICD-10-CM

## 2020-03-07 DIAGNOSIS — I25.10 ATHEROSCLEROTIC HEART DISEASE OF NATIVE CORONARY ARTERY WITHOUT ANGINA PECTORIS: ICD-10-CM

## 2020-03-07 DIAGNOSIS — J44.9 CHRONIC OBSTRUCTIVE PULMONARY DISEASE, UNSPECIFIED: ICD-10-CM

## 2020-03-07 DIAGNOSIS — I10 ESSENTIAL (PRIMARY) HYPERTENSION: ICD-10-CM

## 2020-03-07 DIAGNOSIS — Z95.5 PRESENCE OF CORONARY ANGIOPLASTY IMPLANT AND GRAFT: Chronic | ICD-10-CM

## 2020-03-07 DIAGNOSIS — Z29.9 ENCOUNTER FOR PROPHYLACTIC MEASURES, UNSPECIFIED: ICD-10-CM

## 2020-03-07 DIAGNOSIS — J96.20 ACUTE AND CHRONIC RESPIRATORY FAILURE, UNSPECIFIED WHETHER WITH HYPOXIA OR HYPERCAPNIA: ICD-10-CM

## 2020-03-07 DIAGNOSIS — E78.5 HYPERLIPIDEMIA, UNSPECIFIED: ICD-10-CM

## 2020-03-07 DIAGNOSIS — J18.9 PNEUMONIA, UNSPECIFIED ORGANISM: ICD-10-CM

## 2020-03-07 DIAGNOSIS — T14.8XXA OTHER INJURY OF UNSPECIFIED BODY REGION, INITIAL ENCOUNTER: Chronic | ICD-10-CM

## 2020-03-07 DIAGNOSIS — E11.9 TYPE 2 DIABETES MELLITUS WITHOUT COMPLICATIONS: ICD-10-CM

## 2020-03-07 LAB
ALBUMIN SERPL ELPH-MCNC: 3 G/DL — LOW (ref 3.3–5)
ALP SERPL-CCNC: 86 U/L — SIGNIFICANT CHANGE UP (ref 40–120)
ALT FLD-CCNC: 17 U/L — SIGNIFICANT CHANGE UP (ref 12–78)
ANION GAP SERPL CALC-SCNC: 9 MMOL/L — SIGNIFICANT CHANGE UP (ref 5–17)
APPEARANCE UR: ABNORMAL
APTT BLD: 31.8 SEC — SIGNIFICANT CHANGE UP (ref 28.5–37)
AST SERPL-CCNC: 9 U/L — LOW (ref 15–37)
BASE EXCESS BLDA CALC-SCNC: 2.6 MMOL/L — HIGH (ref -2–2)
BASOPHILS # BLD AUTO: 0.01 K/UL — SIGNIFICANT CHANGE UP (ref 0–0.2)
BASOPHILS NFR BLD AUTO: 0.1 % — SIGNIFICANT CHANGE UP (ref 0–2)
BILIRUB SERPL-MCNC: 0.6 MG/DL — SIGNIFICANT CHANGE UP (ref 0.2–1.2)
BILIRUB UR-MCNC: NEGATIVE — SIGNIFICANT CHANGE UP
BLOOD GAS COMMENTS ARTERIAL: SIGNIFICANT CHANGE UP
BUN SERPL-MCNC: 21 MG/DL — SIGNIFICANT CHANGE UP (ref 7–23)
CALCIUM SERPL-MCNC: 9.4 MG/DL — SIGNIFICANT CHANGE UP (ref 8.5–10.1)
CHLORIDE SERPL-SCNC: 102 MMOL/L — SIGNIFICANT CHANGE UP (ref 96–108)
CO2 SERPL-SCNC: 26 MMOL/L — SIGNIFICANT CHANGE UP (ref 22–31)
COLOR SPEC: YELLOW — SIGNIFICANT CHANGE UP
CREAT SERPL-MCNC: 1.3 MG/DL — SIGNIFICANT CHANGE UP (ref 0.5–1.3)
D DIMER BLD IA.RAPID-MCNC: 368 NG/ML DDU — HIGH
DIFF PNL FLD: NEGATIVE — SIGNIFICANT CHANGE UP
EOSINOPHIL # BLD AUTO: 0 K/UL — SIGNIFICANT CHANGE UP (ref 0–0.5)
EOSINOPHIL NFR BLD AUTO: 0 % — SIGNIFICANT CHANGE UP (ref 0–6)
EPI CELLS # UR: SIGNIFICANT CHANGE UP
FLU A RESULT: SIGNIFICANT CHANGE UP
FLU A RESULT: SIGNIFICANT CHANGE UP
FLUAV AG NPH QL: SIGNIFICANT CHANGE UP
FLUBV AG NPH QL: SIGNIFICANT CHANGE UP
GLUCOSE SERPL-MCNC: 218 MG/DL — HIGH (ref 70–99)
GLUCOSE UR QL: 1000 MG/DL
HCO3 BLDA-SCNC: 26 MMOL/L — SIGNIFICANT CHANGE UP (ref 23–27)
HCT VFR BLD CALC: 36.9 % — LOW (ref 39–50)
HGB BLD-MCNC: 11.5 G/DL — LOW (ref 13–17)
HOROWITZ INDEX BLDA+IHG-RTO: 60 — SIGNIFICANT CHANGE UP
IMM GRANULOCYTES NFR BLD AUTO: 0.6 % — SIGNIFICANT CHANGE UP (ref 0–1.5)
INR BLD: 1.03 RATIO — SIGNIFICANT CHANGE UP (ref 0.88–1.16)
KETONES UR-MCNC: ABNORMAL
LACTATE SERPL-SCNC: 1.3 MMOL/L — SIGNIFICANT CHANGE UP (ref 0.7–2)
LEUKOCYTE ESTERASE UR-ACNC: NEGATIVE — SIGNIFICANT CHANGE UP
LYMPHOCYTES # BLD AUTO: 0.4 K/UL — LOW (ref 1–3.3)
LYMPHOCYTES # BLD AUTO: 2.5 % — LOW (ref 13–44)
MCHC RBC-ENTMCNC: 26.7 PG — LOW (ref 27–34)
MCHC RBC-ENTMCNC: 31.2 GM/DL — LOW (ref 32–36)
MCV RBC AUTO: 85.8 FL — SIGNIFICANT CHANGE UP (ref 80–100)
MONOCYTES # BLD AUTO: 1.23 K/UL — HIGH (ref 0–0.9)
MONOCYTES NFR BLD AUTO: 7.8 % — SIGNIFICANT CHANGE UP (ref 2–14)
NEUTROPHILS # BLD AUTO: 13.95 K/UL — HIGH (ref 1.8–7.4)
NEUTROPHILS NFR BLD AUTO: 89 % — HIGH (ref 43–77)
NITRITE UR-MCNC: NEGATIVE — SIGNIFICANT CHANGE UP
NRBC # BLD: 0 /100 WBCS — SIGNIFICANT CHANGE UP (ref 0–0)
NT-PROBNP SERPL-SCNC: 154 PG/ML — SIGNIFICANT CHANGE UP (ref 0–450)
PCO2 BLDA: 45 MMHG — SIGNIFICANT CHANGE UP (ref 32–46)
PH BLDA: 7.4 — SIGNIFICANT CHANGE UP (ref 7.35–7.45)
PH UR: 5 — SIGNIFICANT CHANGE UP (ref 5–8)
PLATELET # BLD AUTO: 206 K/UL — SIGNIFICANT CHANGE UP (ref 150–400)
PO2 BLDA: 186 MMHG — HIGH (ref 74–108)
POTASSIUM SERPL-MCNC: 4 MMOL/L — SIGNIFICANT CHANGE UP (ref 3.5–5.3)
POTASSIUM SERPL-SCNC: 4 MMOL/L — SIGNIFICANT CHANGE UP (ref 3.5–5.3)
PROT SERPL-MCNC: 6.8 G/DL — SIGNIFICANT CHANGE UP (ref 6–8.3)
PROT UR-MCNC: 30 MG/DL
PROTHROM AB SERPL-ACNC: 11.5 SEC — SIGNIFICANT CHANGE UP (ref 10–12.9)
RBC # BLD: 4.3 M/UL — SIGNIFICANT CHANGE UP (ref 4.2–5.8)
RBC # FLD: 14 % — SIGNIFICANT CHANGE UP (ref 10.3–14.5)
RSV RESULT: SIGNIFICANT CHANGE UP
RSV RNA RESP QL NAA+PROBE: SIGNIFICANT CHANGE UP
SAO2 % BLDA: 100 % — HIGH (ref 92–96)
SODIUM SERPL-SCNC: 137 MMOL/L — SIGNIFICANT CHANGE UP (ref 135–145)
SP GR SPEC: 1.01 — SIGNIFICANT CHANGE UP (ref 1.01–1.02)
TROPONIN I SERPL-MCNC: <.015 NG/ML — SIGNIFICANT CHANGE UP (ref 0.01–0.04)
UROBILINOGEN FLD QL: NEGATIVE — SIGNIFICANT CHANGE UP
WBC # BLD: 15.69 K/UL — HIGH (ref 3.8–10.5)
WBC # FLD AUTO: 15.69 K/UL — HIGH (ref 3.8–10.5)
WBC UR QL: SIGNIFICANT CHANGE UP

## 2020-03-07 PROCEDURE — 99223 1ST HOSP IP/OBS HIGH 75: CPT | Mod: GC

## 2020-03-07 PROCEDURE — 71045 X-RAY EXAM CHEST 1 VIEW: CPT | Mod: 26

## 2020-03-07 PROCEDURE — 99285 EMERGENCY DEPT VISIT HI MDM: CPT

## 2020-03-07 PROCEDURE — 12345: CPT | Mod: NC

## 2020-03-07 PROCEDURE — 71275 CT ANGIOGRAPHY CHEST: CPT | Mod: 26

## 2020-03-07 PROCEDURE — 93010 ELECTROCARDIOGRAM REPORT: CPT

## 2020-03-07 RX ORDER — DEXTROSE 50 % IN WATER 50 %
12.5 SYRINGE (ML) INTRAVENOUS ONCE
Refills: 0 | Status: DISCONTINUED | OUTPATIENT
Start: 2020-03-07 | End: 2020-03-09

## 2020-03-07 RX ORDER — DEXTROSE 50 % IN WATER 50 %
15 SYRINGE (ML) INTRAVENOUS ONCE
Refills: 0 | Status: DISCONTINUED | OUTPATIENT
Start: 2020-03-07 | End: 2020-03-09

## 2020-03-07 RX ORDER — CEFTRIAXONE 500 MG/1
1000 INJECTION, POWDER, FOR SOLUTION INTRAMUSCULAR; INTRAVENOUS ONCE
Refills: 0 | Status: COMPLETED | OUTPATIENT
Start: 2020-03-07 | End: 2020-03-07

## 2020-03-07 RX ORDER — AZITHROMYCIN 500 MG/1
500 TABLET, FILM COATED ORAL EVERY 24 HOURS
Refills: 0 | Status: DISCONTINUED | OUTPATIENT
Start: 2020-03-08 | End: 2020-03-09

## 2020-03-07 RX ORDER — ATORVASTATIN CALCIUM 80 MG/1
40 TABLET, FILM COATED ORAL AT BEDTIME
Refills: 0 | Status: DISCONTINUED | OUTPATIENT
Start: 2020-03-07 | End: 2020-03-09

## 2020-03-07 RX ORDER — AZITHROMYCIN 500 MG/1
TABLET, FILM COATED ORAL
Refills: 0 | Status: DISCONTINUED | OUTPATIENT
Start: 2020-03-07 | End: 2020-03-09

## 2020-03-07 RX ORDER — INSULIN LISPRO 100/ML
VIAL (ML) SUBCUTANEOUS
Refills: 0 | Status: DISCONTINUED | OUTPATIENT
Start: 2020-03-07 | End: 2020-03-09

## 2020-03-07 RX ORDER — ALBUTEROL 90 UG/1
2 AEROSOL, METERED ORAL EVERY 6 HOURS
Refills: 0 | Status: DISCONTINUED | OUTPATIENT
Start: 2020-03-07 | End: 2020-03-07

## 2020-03-07 RX ORDER — VALSARTAN 80 MG/1
1 TABLET ORAL
Qty: 0 | Refills: 0 | DISCHARGE

## 2020-03-07 RX ORDER — SODIUM CHLORIDE 9 MG/ML
1000 INJECTION, SOLUTION INTRAVENOUS
Refills: 0 | Status: DISCONTINUED | OUTPATIENT
Start: 2020-03-07 | End: 2020-03-09

## 2020-03-07 RX ORDER — DEXTROSE 50 % IN WATER 50 %
25 SYRINGE (ML) INTRAVENOUS ONCE
Refills: 0 | Status: DISCONTINUED | OUTPATIENT
Start: 2020-03-07 | End: 2020-03-09

## 2020-03-07 RX ORDER — IPRATROPIUM/ALBUTEROL SULFATE 18-103MCG
3 AEROSOL WITH ADAPTER (GRAM) INHALATION EVERY 6 HOURS
Refills: 0 | Status: DISCONTINUED | OUTPATIENT
Start: 2020-03-07 | End: 2020-03-09

## 2020-03-07 RX ORDER — CEFTRIAXONE 500 MG/1
1000 INJECTION, POWDER, FOR SOLUTION INTRAMUSCULAR; INTRAVENOUS EVERY 24 HOURS
Refills: 0 | Status: DISCONTINUED | OUTPATIENT
Start: 2020-03-08 | End: 2020-03-09

## 2020-03-07 RX ORDER — ROFLUMILAST 500 UG/1
1 TABLET ORAL
Qty: 0 | Refills: 0 | DISCHARGE

## 2020-03-07 RX ORDER — CEFTRIAXONE 500 MG/1
INJECTION, POWDER, FOR SOLUTION INTRAMUSCULAR; INTRAVENOUS
Refills: 0 | Status: DISCONTINUED | OUTPATIENT
Start: 2020-03-07 | End: 2020-03-09

## 2020-03-07 RX ORDER — BUDESONIDE AND FORMOTEROL FUMARATE DIHYDRATE 160; 4.5 UG/1; UG/1
2 AEROSOL RESPIRATORY (INHALATION)
Refills: 0 | Status: DISCONTINUED | OUTPATIENT
Start: 2020-03-07 | End: 2020-03-09

## 2020-03-07 RX ORDER — MONTELUKAST 4 MG/1
10 TABLET, CHEWABLE ORAL AT BEDTIME
Refills: 0 | Status: DISCONTINUED | OUTPATIENT
Start: 2020-03-07 | End: 2020-03-09

## 2020-03-07 RX ORDER — ASPIRIN/CALCIUM CARB/MAGNESIUM 324 MG
81 TABLET ORAL DAILY
Refills: 0 | Status: DISCONTINUED | OUTPATIENT
Start: 2020-03-07 | End: 2020-03-09

## 2020-03-07 RX ORDER — BUDESONIDE AND FORMOTEROL FUMARATE DIHYDRATE 160; 4.5 UG/1; UG/1
1 AEROSOL RESPIRATORY (INHALATION) DAILY
Refills: 0 | Status: DISCONTINUED | OUTPATIENT
Start: 2020-03-07 | End: 2020-03-07

## 2020-03-07 RX ORDER — IPRATROPIUM/ALBUTEROL SULFATE 18-103MCG
3 AEROSOL WITH ADAPTER (GRAM) INHALATION ONCE
Refills: 0 | Status: COMPLETED | OUTPATIENT
Start: 2020-03-07 | End: 2020-03-07

## 2020-03-07 RX ORDER — HEPARIN SODIUM 5000 [USP'U]/ML
5000 INJECTION INTRAVENOUS; SUBCUTANEOUS EVERY 8 HOURS
Refills: 0 | Status: DISCONTINUED | OUTPATIENT
Start: 2020-03-07 | End: 2020-03-09

## 2020-03-07 RX ORDER — ASCORBIC ACID 60 MG
500 TABLET,CHEWABLE ORAL DAILY
Refills: 0 | Status: DISCONTINUED | OUTPATIENT
Start: 2020-03-07 | End: 2020-03-09

## 2020-03-07 RX ORDER — METOPROLOL TARTRATE 50 MG
12.5 TABLET ORAL
Refills: 0 | Status: DISCONTINUED | OUTPATIENT
Start: 2020-03-07 | End: 2020-03-09

## 2020-03-07 RX ORDER — GLUCAGON INJECTION, SOLUTION 0.5 MG/.1ML
1 INJECTION, SOLUTION SUBCUTANEOUS ONCE
Refills: 0 | Status: DISCONTINUED | OUTPATIENT
Start: 2020-03-07 | End: 2020-03-09

## 2020-03-07 RX ORDER — TAMSULOSIN HYDROCHLORIDE 0.4 MG/1
0.4 CAPSULE ORAL AT BEDTIME
Refills: 0 | Status: DISCONTINUED | OUTPATIENT
Start: 2020-03-07 | End: 2020-03-09

## 2020-03-07 RX ORDER — CLOPIDOGREL BISULFATE 75 MG/1
75 TABLET, FILM COATED ORAL DAILY
Refills: 0 | Status: DISCONTINUED | OUTPATIENT
Start: 2020-03-07 | End: 2020-03-09

## 2020-03-07 RX ORDER — AZITHROMYCIN 500 MG/1
500 TABLET, FILM COATED ORAL ONCE
Refills: 0 | Status: COMPLETED | OUTPATIENT
Start: 2020-03-07 | End: 2020-03-07

## 2020-03-07 RX ORDER — VALSARTAN 80 MG/1
0 TABLET ORAL
Qty: 0 | Refills: 0 | DISCHARGE

## 2020-03-07 RX ADMIN — Medication 125 MILLIGRAM(S): at 03:53

## 2020-03-07 RX ADMIN — Medication 12.5 MILLIGRAM(S): at 17:07

## 2020-03-07 RX ADMIN — CLOPIDOGREL BISULFATE 75 MILLIGRAM(S): 75 TABLET, FILM COATED ORAL at 12:23

## 2020-03-07 RX ADMIN — Medication 40 MILLIGRAM(S): at 12:22

## 2020-03-07 RX ADMIN — Medication 10: at 21:54

## 2020-03-07 RX ADMIN — TAMSULOSIN HYDROCHLORIDE 0.4 MILLIGRAM(S): 0.4 CAPSULE ORAL at 21:27

## 2020-03-07 RX ADMIN — HEPARIN SODIUM 5000 UNIT(S): 5000 INJECTION INTRAVENOUS; SUBCUTANEOUS at 13:09

## 2020-03-07 RX ADMIN — Medication 12: at 17:07

## 2020-03-07 RX ADMIN — AZITHROMYCIN 255 MILLIGRAM(S): 500 TABLET, FILM COATED ORAL at 08:26

## 2020-03-07 RX ADMIN — Medication 3 MILLILITER(S): at 19:20

## 2020-03-07 RX ADMIN — ATORVASTATIN CALCIUM 40 MILLIGRAM(S): 80 TABLET, FILM COATED ORAL at 21:27

## 2020-03-07 RX ADMIN — Medication 1 TABLET(S): at 12:22

## 2020-03-07 RX ADMIN — Medication 3 MILLILITER(S): at 03:48

## 2020-03-07 RX ADMIN — Medication 12: at 12:28

## 2020-03-07 RX ADMIN — Medication 500 MILLIGRAM(S): at 12:22

## 2020-03-07 RX ADMIN — MONTELUKAST 10 MILLIGRAM(S): 4 TABLET, CHEWABLE ORAL at 21:27

## 2020-03-07 RX ADMIN — Medication 3 MILLILITER(S): at 22:12

## 2020-03-07 RX ADMIN — HEPARIN SODIUM 5000 UNIT(S): 5000 INJECTION INTRAVENOUS; SUBCUTANEOUS at 21:27

## 2020-03-07 RX ADMIN — BUDESONIDE AND FORMOTEROL FUMARATE DIHYDRATE 2 PUFF(S): 160; 4.5 AEROSOL RESPIRATORY (INHALATION) at 21:27

## 2020-03-07 RX ADMIN — CEFTRIAXONE 100 MILLIGRAM(S): 500 INJECTION, POWDER, FOR SOLUTION INTRAMUSCULAR; INTRAVENOUS at 07:23

## 2020-03-07 NOTE — PROGRESS NOTE ADULT - PROBLEM SELECTOR PLAN 5
-CAD (w/ 3v CABG 2004), s/p 2 recent coronary stents at [place], TRAVIS s/p LLE stent (11/2019)  -Continue home asa, plavix, statin

## 2020-03-07 NOTE — H&P ADULT - ATTENDING COMMENTS
CT shows LLL PNA with parapneumonic effusion. Will start IV ABX. If CTA shows PE will start full anti-coagulation.

## 2020-03-07 NOTE — H&P ADULT - PROBLEM SELECTOR PLAN 5
-Continue metoprolol -CAD (w/ 3v CABG 2004), s/p 2 recent coronary stents at [place], TRAVIS s/p LLE stent (11/2019)  -Continue home asa, plavix, statin

## 2020-03-07 NOTE — H&P ADULT - PROBLEM SELECTOR PLAN 9
IMPROVE VTE Individual Risk Assessment          RISK                                                          Points  [  ] Previous VTE                                                3  [  ] Thrombophilia                                             2  [  ] Lower limb paralysis                                   2        (unable to hold up >15 seconds)    [  ] Current Cancer                                             2         (within 6 months)  [  ] Immobilization > 24 hrs                              1  [  ] ICU/CCU stay > 24 hours                             1  [1  ] Age > 60                                                         1    IMPROVE VTE Score: 1    Lovenox 40mg daily

## 2020-03-07 NOTE — H&P ADULT - PROBLEM SELECTOR PLAN 8
IMPROVE VTE Individual Risk Assessment          RISK                                                          Points  [  ] Previous VTE                                                3  [  ] Thrombophilia                                             2  [  ] Lower limb paralysis                                   2        (unable to hold up >15 seconds)    [  ] Current Cancer                                             2         (within 6 months)  [  ] Immobilization > 24 hrs                              1  [  ] ICU/CCU stay > 24 hours                             1  [1  ] Age > 60                                                         1    IMPROVE VTE Score: 1 IMPROVE VTE Individual Risk Assessment          RISK                                                          Points  [  ] Previous VTE                                                3  [  ] Thrombophilia                                             2  [  ] Lower limb paralysis                                   2        (unable to hold up >15 seconds)    [  ] Current Cancer                                             2         (within 6 months)  [  ] Immobilization > 24 hrs                              1  [  ] ICU/CCU stay > 24 hours                             1  [1  ] Age > 60                                                         1    IMPROVE VTE Score: 1    Lovenox 40mg daily -Continue statin

## 2020-03-07 NOTE — PROGRESS NOTE ADULT - ASSESSMENT
Patient is a 78 y/o with pmhx COPD (On home O2 2L and BIPAP at while sleeping regardless of time of day), CAD (w/ 3v CABG 2004), s/p 2 recent coronary stents at [place], PVD s/p LLE stent (11/2019), HLD, DM2 (on Metformin), BPH, hx Hypercapnic Respiratory Failure/Cardiac Arrest requiring intubation (6/18), with multiple frequent admissions, last in 1/2020 for COPD exacerbation. Admitted for COPD exacerbation, also found to have PNA

## 2020-03-07 NOTE — H&P ADULT - PROBLEM SELECTOR PLAN 3
-Patient on nucala injection, 1x monthly, last injection on 2/24. Was previously on fasenra but switched due cost  -Continue montelukast and ventolin PRN -CTA prelim show L sided pneumonia with parapneumonic effusion, f/u official read  -Start rocephin, azithro  -Monitor closely for fevers, trend CBC

## 2020-03-07 NOTE — H&P ADULT - ASSESSMENT
Patient is a 78 y/o with pmhx COPD (On home O2 2L and BIPAP at while sleeping regardless of time of day), CAD (w/ 3v CABG 2004), s/p 2 recent coronary stents at [place], PVD s/p LLE stent (11/2019), HLD, DM2 (on Metformin), BPH, hx Hypercapnic Respiratory Failure/Cardiac Arrest requiring intubation (6/18), with multiple frequent admissions, last in 1/2020 for COPD exacerbation. Admitted for COPD exacerbation, rule out PE

## 2020-03-07 NOTE — PROGRESS NOTE ADULT - SUBJECTIVE AND OBJECTIVE BOX
INTERVAL HPI/OVERNIGHT EVENTS:  Patient admitted early this AM for resp failure possibly 2/2 COPD exacerbation vs. PNA. Patient seen and examined at bedside. Appears comfortable, eating breakfast. States he feels better, denies any CP, abd pain, endorses SOB on exertion.    MEDICATIONS  (STANDING):  ascorbic acid 500 milliGRAM(s) Oral daily  aspirin enteric coated 81 milliGRAM(s) Oral daily  atorvastatin 40 milliGRAM(s) Oral at bedtime  azithromycin  IVPB      budesonide  80 MICROgram(s)/formoterol 4.5 MICROgram(s) Inhaler 2 Puff(s) Inhalation two times a day  cefTRIAXone   IVPB      clopidogrel Tablet 75 milliGRAM(s) Oral daily  dextrose 5%. 1000 milliLiter(s) (50 mL/Hr) IV Continuous <Continuous>  dextrose 50% Injectable 12.5 Gram(s) IV Push once  dextrose 50% Injectable 25 Gram(s) IV Push once  dextrose 50% Injectable 25 Gram(s) IV Push once  heparin  Injectable 5000 Unit(s) SubCutaneous every 8 hours  insulin lispro (HumaLOG) corrective regimen sliding scale   SubCutaneous Before meals and at bedtime  metoprolol tartrate 12.5 milliGRAM(s) Oral two times a day  montelukast 10 milliGRAM(s) Oral at bedtime  multivitamin 1 Tablet(s) Oral daily  predniSONE   Tablet 40 milliGRAM(s) Oral daily  tamsulosin 0.4 milliGRAM(s) Oral at bedtime    MEDICATIONS  (PRN):  ALBUTerol    90 MICROgram(s) HFA Inhaler 2 Puff(s) Inhalation every 6 hours PRN Shortness of Breath and/or Wheezing  dextrose 40% Gel 15 Gram(s) Oral once PRN Blood Glucose LESS THAN 70 milliGRAM(s)/deciliter  glucagon  Injectable 1 milliGRAM(s) IntraMuscular once PRN Glucose LESS THAN 70 milligrams/deciliter      Allergies    No Known Allergies    Intolerances    shellfish (Nausea)      ROS:  CONSTITUTIONAL: + weakness, no fevers or chills  EYES/ENT: No visual changes;  No vertigo or throat pain   NECK: No pain or stiffness  RESPIRATORY: + cough, wheezing, no hemoptysis; + shortness of breath  CARDIOVASCULAR: No chest pain or palpitations  GASTROINTESTINAL: No abdominal or epigastric pain. No nausea, vomiting, or hematemesis  GENITOURINARY: No dysuria, frequency or hematuria  NEUROLOGICAL: No numbness  SKIN: No itching, burning, rashes, or lesions   All other review of systems is negative unless indicated above.    Vital Signs Last 24 Hrs  T(C): 36.4 (07 Mar 2020 10:43), Max: 36.7 (07 Mar 2020 06:02)  T(F): 97.6 (07 Mar 2020 10:43), Max: 98.1 (07 Mar 2020 06:02)  HR: 98 (07 Mar 2020 10:43) (82 - 149)  BP: 117/64 (07 Mar 2020 10:43) (117/64 - 136/65)  BP(mean): --  RR: 20 (07 Mar 2020 10:43) (20 - 26)  SpO2: 99% (07 Mar 2020 10:43) (98% - 100%)     @ 06:01  -   @ 15:41  --------------------------------------------------------  IN: 0 mL / OUT: 150 mL / NET: -150 mL      Physical Exam:  General: WN/WD NAD  Neurology: A&Ox3, nonfocal, ZIMMERMAN x 4  Respiratory: +wheezing NUNU, LML, +dec breath sounds RLL  CV: RRR, S1S2  Abdominal: Soft, NT, ND +BS  Extremities: + peripheral pulses      LABS:                        11.5   15.69 )-----------( 206      ( 07 Mar 2020 03:42 )             36.9     03-07    137  |  102  |  21  ----------------------------<  218<H>  4.0   |  26  |  1.30    Ca    9.4      07 Mar 2020 03:42    TPro  6.8  /  Alb  3.0<L>  /  TBili  0.6  /  DBili  x   /  AST  9<L>  /  ALT  17  /  AlkPhos  86  03-07    PT/INR - ( 07 Mar 2020 03:42 )   PT: 11.5 sec;   INR: 1.03 ratio         PTT - ( 07 Mar 2020 03:42 )  PTT:31.8 sec  Urinalysis Basic - ( 07 Mar 2020 08:40 )    Color: Yellow / Appearance: Slightly Turbid / S.010 / pH: x  Gluc: x / Ketone: Moderate  / Bili: Negative / Urobili: Negative   Blood: x / Protein: 30 mg/dL / Nitrite: Negative   Leuk Esterase: Negative / RBC: x / WBC 0-2   Sq Epi: x / Non Sq Epi: Occasional / Bacteria: x        RADIOLOGY & ADDITIONAL TESTS:

## 2020-03-07 NOTE — ED PROVIDER NOTE - OBJECTIVE STATEMENT
78 y/o male h/o COPD, C/C SOB, his wife notes increased labored breading for one day associated with weakness, he was using Bipap more frequently w/o benefit and his ventolin pump was ineffective this evening. This evening she notes increased SOB, lower SAT and increased HR. No CP, no fever, no chills, no URI symptoms, no sick contacts, no stroke symptoms  Pulmonologist Dr Dejesus

## 2020-03-07 NOTE — H&P ADULT - NSHPREVIEWOFSYSTEMS_GEN_ALL_CORE
CONSTITUTIONAL: denies fever, chills, fatigue, weakness  HEENT: denies blurred vision, sore throat  SKIN: denies new lesions, rash  CARDIOVASCULAR: denies chest pain, chest pressure, palpitations  RESPIRATORY: admits shortness of breath, sputum production  GASTROINTESTINAL: denies nausea, vomiting, diarrhea, abdominal pain  GENITOURINARY: denies dysuria, discharge  NEUROLOGICAL: denies numbness, headache, focal weakness  MUSCULOSKELETAL: denies new joint pain, muscle aches  HEMATOLOGIC: denies gross bleeding, bruising  LYMPHATICS: denies enlarged lymph nodes, extremity swelling

## 2020-03-07 NOTE — ED PROVIDER NOTE - CLINICAL SUMMARY MEDICAL DECISION MAKING FREE TEXT BOX
presents with one day of increasing SOB weakness tachycardia and low oxygen saturation PE HR tachycardic Lungs BS diminished CXR no infiltrate WBC leukocytosis  Dx exacerbation of COPD Rx neb , solumedrol BiPap and admission for stabilization of condition presents with one day of increasing SOB weakness tachycardia and low oxygen saturation PE HR tachycardic Lungs BS diminished CXR with new  infiltrate WBC leukocytosis  Dx exacerbation of COPD Rx neb , solumedrol BiPap IV AB for the pneumonia.

## 2020-03-07 NOTE — ED PROVIDER NOTE - SIGNIFICANT NEGATIVE FINDINGS
no headache, no chest pain, no Syncope , no abdominal pain , no n/v/d, no urinary symptoms, no GI bleeding. no neuro changes.

## 2020-03-07 NOTE — ED ADULT NURSE NOTE - NSIMPLEMENTINTERV_GEN_ALL_ED
Implemented All Fall Risk Interventions:  Landing to call system. Call bell, personal items and telephone within reach. Instruct patient to call for assistance. Room bathroom lighting operational. Non-slip footwear when patient is off stretcher. Physically safe environment: no spills, clutter or unnecessary equipment. Stretcher in lowest position, wheels locked, appropriate side rails in place. Provide visual cue, wrist band, yellow gown, etc. Monitor gait and stability. Monitor for mental status changes and reorient to person, place, and time. Review medications for side effects contributing to fall risk. Reinforce activity limits and safety measures with patient and family.

## 2020-03-07 NOTE — ED ADULT NURSE NOTE - OBJECTIVE STATEMENT
patient a/o x 4 with a calm affect presents with difficulty breathing x 24 hours.  patient states he is fine, but is tachypneic and appears short of breath on cpap.  EKG, Xray chest completed, pending initial labs. patient to remain on cont cardiac monitor

## 2020-03-07 NOTE — PROGRESS NOTE ADULT - PROBLEM SELECTOR PLAN 3
-CTA showing L sided pneumonia with parapneumonic effusion, f/u official read  -Start rocephin, azithro  -Monitor closely for fevers, trend CBC

## 2020-03-07 NOTE — H&P ADULT - NSHPSOCIALHISTORY_GEN_ALL_CORE
Lives with family  Former etoh use, socially  Former smoker (smoked for 30 years 1ppd, quit 30 years ago)  Ambulates without assistance

## 2020-03-07 NOTE — H&P ADULT - HISTORY OF PRESENT ILLNESS
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 Wife at bedside. Reports patient started to have SOB last night while on BIPAP, wife attributed to the BIPAP machine being "broken" as the numbers on it seemed different. Patient was switched from BIPAP to 2L O2 NC with saturations in the uppers 80s and low 90s. SOB continued throughout the day and decision was made to bring patient to ED. Denies fevers, chills, chest pain, palpitations.     In the ED, vitals were temp, , /63, RR 26, O2 98% on BIPAP  Labs were significant for wbc 15.69, H/H 11.5/36.9, d-dimer 360, glucose 218, alb 3.0, AST 9, trop negative  Flu A/B/RSV negative  ABG 7.40/45/186/26  S/p duoneb x1, solumedrol 125mg IVP

## 2020-03-07 NOTE — H&P ADULT - PROBLEM SELECTOR PLAN 4
-CAD (w/ 3v CABG 2004), s/p 2 recent coronary stents at [place], TRAVIS s/p LLE stent (11/2019)  -Continue home asa, plavix, statin -Patient on nucala injection, 1x monthly, last injection on 2/24. Was previously on fasenra but switched due cost  -Continue montelukast and ventolin PRN

## 2020-03-07 NOTE — ED PROVIDER NOTE - CONSTITUTIONAL, MLM
normal... Well appearing, awake, alert, oriented to person, place, time/situation and in moderate  distress.

## 2020-03-07 NOTE — ED PROVIDER NOTE - CARE PLAN
Principal Discharge DX:	COPD (chronic obstructive pulmonary disease) Principal Discharge DX:	COPD (chronic obstructive pulmonary disease)  Secondary Diagnosis:	Pneumonia

## 2020-03-07 NOTE — PHARMACOTHERAPY INTERVENTION NOTE - COMMENTS
Patient uses Breo Ellipta outpatient for COPD - interchanged to Symbicort for inpatient use. Recommended increasing dose to 2 puffs BID for appropriate dose conversion.

## 2020-03-07 NOTE — ED PROVIDER NOTE - PROGRESS NOTE DETAILS
Pneumonia confirmed of CXR , CT Chest with LLL Pneumonia  Discussed with Dr Vahid cortes ordering Zithromax and Rocephin

## 2020-03-07 NOTE — H&P ADULT - PROBLEM SELECTOR PLAN 1
-Likely 2/2 COPD exacerbation vs R/O PE vs R/O infections  -Admit to tele  -On BIPAP with O2 sat in the upper 90s  -S/p IV solumedrol 125mg  -On prednisone 10mg at home, start Prednisone 40mg daily  -Continue ventolin PRN and symbicort  -D-dimer 368, negative when adjusted for age but will obtain CTA in setting of SOB and tachycardia  -Less likely infection as patient afebrile. Elevated WBC likely reactive to respiratory distress and prednisone use  -Check procalcitonin level  -Dr. Dejesus consulted, f/u -Likely 2/2 COPD exacerbation vs R/O PE vs R/O infections  -Admit to tele  -On BIPAP with O2 sat in the upper 90s  -S/p IV solumedrol 125mg  -On prednisone 10mg at home, start Prednisone 40mg daily  -Continue ventolin PRN and symbicort  -D-dimer 368, negative when adjusted for age but will obtain CTA in setting of SOB and tachycardia, f/u  -Less likely infection as patient afebrile. Elevated WBC likely reactive to respiratory distress and prednisone use  -Check procalcitonin level  -Dr. Dejesus consulted, f/u

## 2020-03-07 NOTE — ED PROVIDER NOTE - FAMILY HISTORY
Family hx of lung cancer, brother,  age 82, used to smoke with pt     Sibling  Still living? Unknown  Family history of diabetes mellitus, Age at diagnosis: Age Unknown

## 2020-03-07 NOTE — PATIENT PROFILE ADULT - FUNCTIONAL SCREEN CURRENT LEVEL: EATING, MLM
Presbyopia Counseling: The diagnosis of presbyopia was explained to the patient. Options for the correction of the patient's presbyopia which may include glasses, contacts or elective refractive surgery were discussed. Return for follow-up as scheduled. 4 = completely dependent

## 2020-03-07 NOTE — ED ADULT NURSE REASSESSMENT NOTE - NS ED NURSE REASSESS COMMENT FT1
Bipap was removed and nasal canula 2 liters oxygen applied while patient ate breakfast with good appetite.  Patient was transferred to room 13A and will be endorsed to Ricky Jackson RN.  Respiratory Therapist will reapply bipap.
Patient and report received at 0720.  Patient lying on stretcher in ER, eyes closed, appears to be sleeping.  Respiration even and unlabored, breath sounds assessed, no wheezing, rhonchi noted right lung.  Dr. Mckee informed spouse that there is no blood clot in lungs.  IV ceftriaxone infusing as ordered to left cephalic 20 gauge IV.  Patient is awaiting a telemetry bed.

## 2020-03-07 NOTE — H&P ADULT - NSHPPHYSICALEXAM_GEN_ALL_CORE
T(C): 36.7 (03-07-20 @ 06:02), Max: 36.7 (03-07-20 @ 06:02)  HR: 110 (03-07-20 @ 06:02) (110 - 149)  BP: 130/60 (03-07-20 @ 06:02) (127/63 - 130/60)  RR: 22 (03-07-20 @ 06:02) (22 - 26)  SpO2: 100% (03-07-20 @ 06:02) (98% - 100%)    GENERAL: patient appears well, no acute distress, appropriate, pleasant, on BIPAP  EYES: sclera clear, no exudates  NECK: supple, soft  LUNGS: decreased breath sounds b/l, no wheezing or rhonchi appreciated  HEART: soft S1/S2, regular rate and rhythm, no murmurs noted, trace LE edema, chronic per patient  GASTROINTESTINAL: abdomen is soft, distended, nondistended, normoactive bowel sounds, no palpable masses  INTEGUMENT: good skin turgor, warm skin, appears well perfused  MUSCULOSKELETAL: no clubbing or cyanosis, no obvious deformity  NEUROLOGIC: awake, alert, oriented x3, good muscle tone in 4 extremities, no obvious sensory deficits  PSYCHIATRIC: mood is good, affect is congruent, linear and logical thought process  HEME/LYMPH: no palpable supraclavicular nodules, no obvious ecchymosis or petechiae

## 2020-03-07 NOTE — PROGRESS NOTE ADULT - PROBLEM SELECTOR PLAN 4
-Patient on nucala injection, 1x monthly, last injection on 2/24. Was previously on fasenra but switched due cost  -Continue montelukast and duoneb

## 2020-03-08 LAB
ANION GAP SERPL CALC-SCNC: 7 MMOL/L — SIGNIFICANT CHANGE UP (ref 5–17)
BUN SERPL-MCNC: 33 MG/DL — HIGH (ref 7–23)
CALCIUM SERPL-MCNC: 9.4 MG/DL — SIGNIFICANT CHANGE UP (ref 8.5–10.1)
CHLORIDE SERPL-SCNC: 100 MMOL/L — SIGNIFICANT CHANGE UP (ref 96–108)
CO2 SERPL-SCNC: 30 MMOL/L — SIGNIFICANT CHANGE UP (ref 22–31)
CREAT SERPL-MCNC: 1.5 MG/DL — HIGH (ref 0.5–1.3)
CULTURE RESULTS: SIGNIFICANT CHANGE UP
GLUCOSE SERPL-MCNC: 348 MG/DL — HIGH (ref 70–99)
HCT VFR BLD CALC: 35.6 % — LOW (ref 39–50)
HGB BLD-MCNC: 11 G/DL — LOW (ref 13–17)
MCHC RBC-ENTMCNC: 26.7 PG — LOW (ref 27–34)
MCHC RBC-ENTMCNC: 30.9 GM/DL — LOW (ref 32–36)
MCV RBC AUTO: 86.4 FL — SIGNIFICANT CHANGE UP (ref 80–100)
NRBC # BLD: 0 /100 WBCS — SIGNIFICANT CHANGE UP (ref 0–0)
PLATELET # BLD AUTO: 229 K/UL — SIGNIFICANT CHANGE UP (ref 150–400)
POTASSIUM SERPL-MCNC: 4.5 MMOL/L — SIGNIFICANT CHANGE UP (ref 3.5–5.3)
POTASSIUM SERPL-SCNC: 4.5 MMOL/L — SIGNIFICANT CHANGE UP (ref 3.5–5.3)
RBC # BLD: 4.12 M/UL — LOW (ref 4.2–5.8)
RBC # FLD: 13.5 % — SIGNIFICANT CHANGE UP (ref 10.3–14.5)
SODIUM SERPL-SCNC: 137 MMOL/L — SIGNIFICANT CHANGE UP (ref 135–145)
SPECIMEN SOURCE: SIGNIFICANT CHANGE UP
WBC # BLD: 15.25 K/UL — HIGH (ref 3.8–10.5)
WBC # FLD AUTO: 15.25 K/UL — HIGH (ref 3.8–10.5)

## 2020-03-08 PROCEDURE — 99232 SBSQ HOSP IP/OBS MODERATE 35: CPT

## 2020-03-08 RX ORDER — INSULIN LISPRO 100/ML
6 VIAL (ML) SUBCUTANEOUS
Refills: 0 | Status: DISCONTINUED | OUTPATIENT
Start: 2020-03-08 | End: 2020-03-09

## 2020-03-08 RX ORDER — INSULIN GLARGINE 100 [IU]/ML
10 INJECTION, SOLUTION SUBCUTANEOUS AT BEDTIME
Refills: 0 | Status: DISCONTINUED | OUTPATIENT
Start: 2020-03-08 | End: 2020-03-09

## 2020-03-08 RX ADMIN — Medication 500 MILLIGRAM(S): at 11:45

## 2020-03-08 RX ADMIN — ATORVASTATIN CALCIUM 40 MILLIGRAM(S): 80 TABLET, FILM COATED ORAL at 21:25

## 2020-03-08 RX ADMIN — Medication 12.5 MILLIGRAM(S): at 17:17

## 2020-03-08 RX ADMIN — INSULIN GLARGINE 10 UNIT(S): 100 INJECTION, SOLUTION SUBCUTANEOUS at 21:26

## 2020-03-08 RX ADMIN — Medication 12: at 11:45

## 2020-03-08 RX ADMIN — Medication 3 MILLILITER(S): at 23:07

## 2020-03-08 RX ADMIN — Medication 10: at 21:26

## 2020-03-08 RX ADMIN — Medication 10: at 08:14

## 2020-03-08 RX ADMIN — CEFTRIAXONE 100 MILLIGRAM(S): 500 INJECTION, POWDER, FOR SOLUTION INTRAMUSCULAR; INTRAVENOUS at 06:41

## 2020-03-08 RX ADMIN — CLOPIDOGREL BISULFATE 75 MILLIGRAM(S): 75 TABLET, FILM COATED ORAL at 11:45

## 2020-03-08 RX ADMIN — AZITHROMYCIN 255 MILLIGRAM(S): 500 TABLET, FILM COATED ORAL at 08:14

## 2020-03-08 RX ADMIN — HEPARIN SODIUM 5000 UNIT(S): 5000 INJECTION INTRAVENOUS; SUBCUTANEOUS at 13:43

## 2020-03-08 RX ADMIN — BUDESONIDE AND FORMOTEROL FUMARATE DIHYDRATE 2 PUFF(S): 160; 4.5 AEROSOL RESPIRATORY (INHALATION) at 05:51

## 2020-03-08 RX ADMIN — Medication 6 UNIT(S): at 17:16

## 2020-03-08 RX ADMIN — Medication 1 TABLET(S): at 11:45

## 2020-03-08 RX ADMIN — Medication 8: at 17:16

## 2020-03-08 RX ADMIN — Medication 81 MILLIGRAM(S): at 11:45

## 2020-03-08 RX ADMIN — MONTELUKAST 10 MILLIGRAM(S): 4 TABLET, CHEWABLE ORAL at 21:25

## 2020-03-08 RX ADMIN — Medication 3 MILLILITER(S): at 13:49

## 2020-03-08 RX ADMIN — Medication 12.5 MILLIGRAM(S): at 05:52

## 2020-03-08 RX ADMIN — HEPARIN SODIUM 5000 UNIT(S): 5000 INJECTION INTRAVENOUS; SUBCUTANEOUS at 21:25

## 2020-03-08 RX ADMIN — TAMSULOSIN HYDROCHLORIDE 0.4 MILLIGRAM(S): 0.4 CAPSULE ORAL at 21:25

## 2020-03-08 RX ADMIN — HEPARIN SODIUM 5000 UNIT(S): 5000 INJECTION INTRAVENOUS; SUBCUTANEOUS at 05:52

## 2020-03-08 RX ADMIN — Medication 3 MILLILITER(S): at 19:18

## 2020-03-08 RX ADMIN — BUDESONIDE AND FORMOTEROL FUMARATE DIHYDRATE 2 PUFF(S): 160; 4.5 AEROSOL RESPIRATORY (INHALATION) at 21:25

## 2020-03-08 RX ADMIN — Medication 40 MILLIGRAM(S): at 05:52

## 2020-03-08 NOTE — CONSULT NOTE ADULT - PROBLEM SELECTOR RECOMMENDATION 9
add lantus 10 units qhs  add humalog 6 units 3x/day before meals  cont mod dose humalog scale coverage qac/qhs  goal bg 100-180 in hosp setting  cont cons cho diet

## 2020-03-08 NOTE — PROGRESS NOTE ADULT - SUBJECTIVE AND OBJECTIVE BOX
INTERVAL HPI/OVERNIGHT EVENTS:  Patient seen and examined at bedside. States he feels well, breathing much improved.    MEDICATIONS  (STANDING):  albuterol/ipratropium for Nebulization 3 milliLiter(s) Nebulizer every 6 hours  ascorbic acid 500 milliGRAM(s) Oral daily  aspirin enteric coated 81 milliGRAM(s) Oral daily  atorvastatin 40 milliGRAM(s) Oral at bedtime  azithromycin  IVPB 500 milliGRAM(s) IV Intermittent every 24 hours  azithromycin  IVPB      budesonide  80 MICROgram(s)/formoterol 4.5 MICROgram(s) Inhaler 2 Puff(s) Inhalation two times a day  cefTRIAXone   IVPB      cefTRIAXone   IVPB 1000 milliGRAM(s) IV Intermittent every 24 hours  clopidogrel Tablet 75 milliGRAM(s) Oral daily  dextrose 5%. 1000 milliLiter(s) (50 mL/Hr) IV Continuous <Continuous>  dextrose 50% Injectable 12.5 Gram(s) IV Push once  dextrose 50% Injectable 25 Gram(s) IV Push once  dextrose 50% Injectable 25 Gram(s) IV Push once  heparin  Injectable 5000 Unit(s) SubCutaneous every 8 hours  insulin glargine Injectable (LANTUS) 10 Unit(s) SubCutaneous at bedtime  insulin lispro (HumaLOG) corrective regimen sliding scale   SubCutaneous Before meals and at bedtime  insulin lispro Injectable (HumaLOG) 6 Unit(s) SubCutaneous three times a day before meals  metoprolol tartrate 12.5 milliGRAM(s) Oral two times a day  montelukast 10 milliGRAM(s) Oral at bedtime  multivitamin 1 Tablet(s) Oral daily  predniSONE   Tablet 40 milliGRAM(s) Oral daily  tamsulosin 0.4 milliGRAM(s) Oral at bedtime    MEDICATIONS  (PRN):  dextrose 40% Gel 15 Gram(s) Oral once PRN Blood Glucose LESS THAN 70 milliGRAM(s)/deciliter  glucagon  Injectable 1 milliGRAM(s) IntraMuscular once PRN Glucose LESS THAN 70 milligrams/deciliter      Allergies    No Known Allergies    Intolerances    shellfish (Nausea)    ROS:  CONSTITUTIONAL: + weakness, no fevers or chills  EYES/ENT: No visual changes;  No vertigo or throat pain   NECK: No pain or stiffness  RESPIRATORY: + cough, wheezing, no hemoptysis; no shortness of breath  CARDIOVASCULAR: No chest pain or palpitations  GASTROINTESTINAL: No abdominal or epigastric pain. No nausea, vomiting, or hematemesis  GENITOURINARY: No dysuria, frequency or hematuria  NEUROLOGICAL: No numbness  SKIN: No itching, burning, rashes, or lesions   All other review of systems is negative unless indicated above.    Vital Signs Last 24 Hrs  T(C): 36.6 (08 Mar 2020 16:15), Max: 36.6 (08 Mar 2020 16:15)  T(F): 97.8 (08 Mar 2020 16:15), Max: 97.8 (08 Mar 2020 16:15)  HR: 82 (08 Mar 2020 16:15) (64 - 88)  BP: 110/64 (08 Mar 2020 16:15) (109/69 - 120/75)  BP(mean): --  RR: 18 (08 Mar 2020 16:15) (17 - 18)  SpO2: 98% (08 Mar 2020 16:15) (97% - 100%)    07 @ 06:01  -  08 @ 07:00  --------------------------------------------------------  IN: 0 mL / OUT: 300 mL / NET: -300 mL    08 @ 07:01  -  08 @ 18:52  --------------------------------------------------------  IN: 730 mL / OUT: 1070 mL / NET: -340 mL        Physical Exam:  General: WN/WD NAD  Neurology: A&Ox3, nonfocal, ZIMMERMAN x 4  Respiratory: dec breath sounds LLL  CV: RRR, S1S2  Abdominal: Soft, NT, ND +BS  Extremities: + peripheral pulses      LABS:                        11.0   15.25 )-----------( 229      ( 08 Mar 2020 07:53 )             35.6         137  |  100  |  33<H>  ----------------------------<  348<H>  4.5   |  30  |  1.50<H>    Ca    9.4      08 Mar 2020 07:53    TPro  6.8  /  Alb  3.0<L>  /  TBili  0.6  /  DBili  x   /  AST  9<L>  /  ALT  17  /  AlkPhos  86  03-07    PT/INR - ( 07 Mar 2020 03:42 )   PT: 11.5 sec;   INR: 1.03 ratio         PTT - ( 07 Mar 2020 03:42 )  PTT:31.8 sec  Urinalysis Basic - ( 07 Mar 2020 08:40 )    Color: Yellow / Appearance: Slightly Turbid / S.010 / pH: x  Gluc: x / Ketone: Moderate  / Bili: Negative / Urobili: Negative   Blood: x / Protein: 30 mg/dL / Nitrite: Negative   Leuk Esterase: Negative / RBC: x / WBC 0-2   Sq Epi: x / Non Sq Epi: Occasional / Bacteria: x        RADIOLOGY & ADDITIONAL TESTS:

## 2020-03-08 NOTE — PROGRESS NOTE ADULT - PROBLEM SELECTOR PLAN 3
-CTA showing L sided pneumonia with parapneumonic effusion  -Start rocephin, azithro  -Monitor closely for fevers, trend CBC

## 2020-03-08 NOTE — PROGRESS NOTE ADULT - PROBLEM SELECTOR PLAN 5
-CAD (w/ 3v CABG 2004), s/p 2 recent coronary stents at [place], TRAVIS s/p LLE stent (11/2019)  -Continue home asa, plavix, statin

## 2020-03-08 NOTE — PROGRESS NOTE ADULT - ASSESSMENT
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 Admitted for COPD exacerbation, also found to have PNA

## 2020-03-08 NOTE — PHARMACOTHERAPY INTERVENTION NOTE - COMMENTS
Spoke with patient and wife at bedside about COPD and diabetes.    -Glucose running in 400's (on sliding scale insulin), wife concerned, will discuss with care team.    -Patient on Breo and Incruse (laba,lama ICS) at home with PRN albuterol.    -Discussed ways of avoiding exacerbations, e.g. keeping Bipap machine clean, hand washing, avoiding irritants.

## 2020-03-08 NOTE — CONSULT NOTE ADULT - SUBJECTIVE AND OBJECTIVE BOX
Patient is a 79y old  Male who presents with a chief complaint of respiratory distress (07 Mar 2020 15:40)      Reason For Consult: dm2 uncontrolled    HPI:  Patient is a 80 y/o with pmhx COPD (On home O2 2L and BIPAP at while sleeping regardless of time of day), asthma, CAD (w/ 3v CABG ), s/p 2 recent coronary stents at [facility], PVD s/p LLE stent (2019), HLD, DM2 (on Metformin), BPH, hx Hypercapnic Respiratory Failure/Cardiac Arrest requiring intubation (), with multiple frequent admissions, last in 2020 for COPD exacerbation. Wife at bedside. Reports patient started to have SOB last night while on BIPAP, wife attributed to the BIPAP machine being "broken" as the numbers on it seemed different. Patient was switched from BIPAP to 2L O2 NC with saturations in the uppers 80s and low 90s. SOB continued throughout the day and decision was made to bring patient to ED. Denies fevers, chills, chest pain, palpitations.     In the ED, vitals were temp, , /63, RR 26, O2 98% on BIPAP  Labs were significant for wbc 15.69, H/H 11.5/36.9, d-dimer 360, glucose 218, alb 3.0, AST 9, trop negative  Flu A/B/RSV negative  ABG 7.40/45/186/26  S/p duoneb x1, solumedrol 125mg IVP (07 Mar 2020 05:36)      PAST MEDICAL & SURGICAL HISTORY:  PVD (peripheral vascular disease)  Hypertension  COPD (chronic obstructive pulmonary disease): on 2L at home and BiPAP at night; intubated   Osteomyelitis  Dyslipidemia  CAD (Coronary Artery Disease): s/p 3v CABG ; stents placed in [place] in 2019  Diabetes Mellitus, Type II  S/P primary angioplasty with coronary stent  Compound fracture: left leg  CABG (Coronary Artery Bypass Graft):       FAMILY HISTORY:  Family hx of lung cancer: brother,  age 82, used to smoke with pt  Family history of diabetes mellitus (Sibling)        Social History:    MEDICATIONS  (STANDING):  albuterol/ipratropium for Nebulization 3 milliLiter(s) Nebulizer every 6 hours  ascorbic acid 500 milliGRAM(s) Oral daily  aspirin enteric coated 81 milliGRAM(s) Oral daily  atorvastatin 40 milliGRAM(s) Oral at bedtime  azithromycin  IVPB 500 milliGRAM(s) IV Intermittent every 24 hours  azithromycin  IVPB      budesonide  80 MICROgram(s)/formoterol 4.5 MICROgram(s) Inhaler 2 Puff(s) Inhalation two times a day  cefTRIAXone   IVPB      cefTRIAXone   IVPB 1000 milliGRAM(s) IV Intermittent every 24 hours  clopidogrel Tablet 75 milliGRAM(s) Oral daily  dextrose 5%. 1000 milliLiter(s) (50 mL/Hr) IV Continuous <Continuous>  dextrose 50% Injectable 12.5 Gram(s) IV Push once  dextrose 50% Injectable 25 Gram(s) IV Push once  dextrose 50% Injectable 25 Gram(s) IV Push once  heparin  Injectable 5000 Unit(s) SubCutaneous every 8 hours  insulin lispro (HumaLOG) corrective regimen sliding scale   SubCutaneous Before meals and at bedtime  metoprolol tartrate 12.5 milliGRAM(s) Oral two times a day  montelukast 10 milliGRAM(s) Oral at bedtime  multivitamin 1 Tablet(s) Oral daily  predniSONE   Tablet 40 milliGRAM(s) Oral daily  tamsulosin 0.4 milliGRAM(s) Oral at bedtime    MEDICATIONS  (PRN):  dextrose 40% Gel 15 Gram(s) Oral once PRN Blood Glucose LESS THAN 70 milliGRAM(s)/deciliter  glucagon  Injectable 1 milliGRAM(s) IntraMuscular once PRN Glucose LESS THAN 70 milligrams/deciliter        T(C): 36.3 (20 @ 12:34), Max: 36.5 (20 @ 19:02)  HR: 79 (20 @ 13:45) (64 - 97)  BP: 117/74 (20 @ 12:34) (109/69 - 152/76)  RR: 18 (20 @ 12:34) (17 - 20)  SpO2: 99% (20 @ 13:45) (97% - 100%)  Wt(kg): --    PHYSICAL EXAM:  GENERAL: NAD, well-groomed, well-developed  HEAD:  Atraumatic, Normocephalic  NECK: Supple, No JVD, Normal thyroid  CHEST/LUNG: Clear to percussion bilaterally; No rales, rhonchi, wheezing, or rubs  HEART: Regular rate and rhythm; No murmurs, rubs, or gallops  ABDOMEN: Soft, Nontender, Nondistended; Bowel sounds present  EXTREMITIES:  2+ Peripheral Pulses, No clubbing, cyanosis, or edema  SKIN: No rashes or lesions    CAPILLARY BLOOD GLUCOSE      POCT Blood Glucose.: 434 mg/dL (08 Mar 2020 11:41)  POCT Blood Glucose.: 428 mg/dL (08 Mar 2020 11:39)  POCT Blood Glucose.: 356 mg/dL (08 Mar 2020 08:10)  POCT Blood Glucose.: 388 mg/dL (07 Mar 2020 21:50)  POCT Blood Glucose.: 448 mg/dL (07 Mar 2020 16:54)                            11.0   15.25 )-----------( 229      ( 08 Mar 2020 07:53 )             35.6       CMP:  03-08 @ 07:53  SGPT --  Albumin --   Alk Phos --   Anion Gap 7   SGOT --   Total Bili --   BUN 33   Calcium Total 9.4   CO2 30   Chloride 100   Creatinine 1.50   eGFR if AA 50   eGFR if non AA 43   Glucose 348   Potassium 4.5   Protein --   Sodium 137      Thyroid Function Tests:      Diabetes Tests:       Radiology:

## 2020-03-09 ENCOUNTER — TRANSCRIPTION ENCOUNTER (OUTPATIENT)
Age: 80
End: 2020-03-09

## 2020-03-09 VITALS
TEMPERATURE: 98 F | OXYGEN SATURATION: 100 % | HEART RATE: 86 BPM | DIASTOLIC BLOOD PRESSURE: 69 MMHG | SYSTOLIC BLOOD PRESSURE: 116 MMHG | RESPIRATION RATE: 18 BRPM

## 2020-03-09 LAB
ANION GAP SERPL CALC-SCNC: 3 MMOL/L — LOW (ref 5–17)
BUN SERPL-MCNC: 32 MG/DL — HIGH (ref 7–23)
CALCIUM SERPL-MCNC: 9.5 MG/DL — SIGNIFICANT CHANGE UP (ref 8.5–10.1)
CHLORIDE SERPL-SCNC: 103 MMOL/L — SIGNIFICANT CHANGE UP (ref 96–108)
CO2 SERPL-SCNC: 34 MMOL/L — HIGH (ref 22–31)
CREAT SERPL-MCNC: 1.4 MG/DL — HIGH (ref 0.5–1.3)
GLUCOSE SERPL-MCNC: 215 MG/DL — HIGH (ref 70–99)
HCT VFR BLD CALC: 33.6 % — LOW (ref 39–50)
HGB BLD-MCNC: 10.3 G/DL — LOW (ref 13–17)
MCHC RBC-ENTMCNC: 26.4 PG — LOW (ref 27–34)
MCHC RBC-ENTMCNC: 30.7 GM/DL — LOW (ref 32–36)
MCV RBC AUTO: 86.2 FL — SIGNIFICANT CHANGE UP (ref 80–100)
NRBC # BLD: 0 /100 WBCS — SIGNIFICANT CHANGE UP (ref 0–0)
PLATELET # BLD AUTO: 238 K/UL — SIGNIFICANT CHANGE UP (ref 150–400)
POTASSIUM SERPL-MCNC: 4.7 MMOL/L — SIGNIFICANT CHANGE UP (ref 3.5–5.3)
POTASSIUM SERPL-SCNC: 4.7 MMOL/L — SIGNIFICANT CHANGE UP (ref 3.5–5.3)
RBC # BLD: 3.9 M/UL — LOW (ref 4.2–5.8)
RBC # FLD: 13.5 % — SIGNIFICANT CHANGE UP (ref 10.3–14.5)
SODIUM SERPL-SCNC: 140 MMOL/L — SIGNIFICANT CHANGE UP (ref 135–145)
WBC # BLD: 11.75 K/UL — HIGH (ref 3.8–10.5)
WBC # FLD AUTO: 11.75 K/UL — HIGH (ref 3.8–10.5)

## 2020-03-09 PROCEDURE — 80048 BASIC METABOLIC PNL TOTAL CA: CPT

## 2020-03-09 PROCEDURE — 99285 EMERGENCY DEPT VISIT HI MDM: CPT

## 2020-03-09 PROCEDURE — 94640 AIRWAY INHALATION TREATMENT: CPT

## 2020-03-09 PROCEDURE — 36415 COLL VENOUS BLD VENIPUNCTURE: CPT

## 2020-03-09 PROCEDURE — 80053 COMPREHEN METABOLIC PANEL: CPT

## 2020-03-09 PROCEDURE — 83880 ASSAY OF NATRIURETIC PEPTIDE: CPT

## 2020-03-09 PROCEDURE — 94660 CPAP INITIATION&MGMT: CPT

## 2020-03-09 PROCEDURE — 99053 MED SERV 10PM-8AM 24 HR FAC: CPT

## 2020-03-09 PROCEDURE — 81001 URINALYSIS AUTO W/SCOPE: CPT

## 2020-03-09 PROCEDURE — 87631 RESP VIRUS 3-5 TARGETS: CPT

## 2020-03-09 PROCEDURE — 94760 N-INVAS EAR/PLS OXIMETRY 1: CPT

## 2020-03-09 PROCEDURE — 71045 X-RAY EXAM CHEST 1 VIEW: CPT

## 2020-03-09 PROCEDURE — 85730 THROMBOPLASTIN TIME PARTIAL: CPT

## 2020-03-09 PROCEDURE — 82962 GLUCOSE BLOOD TEST: CPT

## 2020-03-09 PROCEDURE — 93005 ELECTROCARDIOGRAM TRACING: CPT

## 2020-03-09 PROCEDURE — 83605 ASSAY OF LACTIC ACID: CPT

## 2020-03-09 PROCEDURE — 85379 FIBRIN DEGRADATION QUANT: CPT

## 2020-03-09 PROCEDURE — 87086 URINE CULTURE/COLONY COUNT: CPT

## 2020-03-09 PROCEDURE — 99239 HOSP IP/OBS DSCHRG MGMT >30: CPT | Mod: GC

## 2020-03-09 PROCEDURE — 71275 CT ANGIOGRAPHY CHEST: CPT

## 2020-03-09 PROCEDURE — 85610 PROTHROMBIN TIME: CPT

## 2020-03-09 PROCEDURE — 82803 BLOOD GASES ANY COMBINATION: CPT

## 2020-03-09 PROCEDURE — 87040 BLOOD CULTURE FOR BACTERIA: CPT

## 2020-03-09 PROCEDURE — 85027 COMPLETE CBC AUTOMATED: CPT

## 2020-03-09 PROCEDURE — 84145 PROCALCITONIN (PCT): CPT

## 2020-03-09 PROCEDURE — 84484 ASSAY OF TROPONIN QUANT: CPT

## 2020-03-09 RX ORDER — CEFUROXIME AXETIL 250 MG
1 TABLET ORAL
Qty: 8 | Refills: 0
Start: 2020-03-09 | End: 2020-03-12

## 2020-03-09 RX ADMIN — BUDESONIDE AND FORMOTEROL FUMARATE DIHYDRATE 2 PUFF(S): 160; 4.5 AEROSOL RESPIRATORY (INHALATION) at 05:44

## 2020-03-09 RX ADMIN — Medication 10: at 16:54

## 2020-03-09 RX ADMIN — Medication 40 MILLIGRAM(S): at 05:44

## 2020-03-09 RX ADMIN — Medication 81 MILLIGRAM(S): at 13:23

## 2020-03-09 RX ADMIN — Medication 4: at 08:21

## 2020-03-09 RX ADMIN — HEPARIN SODIUM 5000 UNIT(S): 5000 INJECTION INTRAVENOUS; SUBCUTANEOUS at 05:44

## 2020-03-09 RX ADMIN — Medication 8: at 12:42

## 2020-03-09 RX ADMIN — Medication 3 MILLILITER(S): at 15:06

## 2020-03-09 RX ADMIN — AZITHROMYCIN 255 MILLIGRAM(S): 500 TABLET, FILM COATED ORAL at 09:18

## 2020-03-09 RX ADMIN — Medication 1 TABLET(S): at 13:22

## 2020-03-09 RX ADMIN — Medication 6 UNIT(S): at 08:22

## 2020-03-09 RX ADMIN — Medication 500 MILLIGRAM(S): at 13:22

## 2020-03-09 RX ADMIN — Medication 6 UNIT(S): at 16:55

## 2020-03-09 RX ADMIN — Medication 12.5 MILLIGRAM(S): at 05:44

## 2020-03-09 RX ADMIN — CEFTRIAXONE 100 MILLIGRAM(S): 500 INJECTION, POWDER, FOR SOLUTION INTRAMUSCULAR; INTRAVENOUS at 06:31

## 2020-03-09 RX ADMIN — CLOPIDOGREL BISULFATE 75 MILLIGRAM(S): 75 TABLET, FILM COATED ORAL at 13:24

## 2020-03-09 RX ADMIN — Medication 6 UNIT(S): at 12:42

## 2020-03-09 RX ADMIN — Medication 3 MILLILITER(S): at 08:20

## 2020-03-09 RX ADMIN — HEPARIN SODIUM 5000 UNIT(S): 5000 INJECTION INTRAVENOUS; SUBCUTANEOUS at 13:23

## 2020-03-09 NOTE — CONSULT NOTE ADULT - ASSESSMENT
dw spouse dw Dr Langford Is getting Nucala as outpt Cont Rx dw spouse dw Dr Langford Is getting Nucala as outpt Cont Rx    [facility] Y[facility] [phone]  1940 DOA 3/7/2020  DR HOANG LANGFORD  ALLERGY [employer]  CONTACT Sp [facility] C              Initial evaluation/Pulmonary Critical Care consultation requested on  3/9/2020  by Dr Langford   from Dr Dejesus   Patient examined chart reviewed    HOSPITAL ADMISSION   PAT[facility] CAME  [facility] (if information available)      [facility] [phone]  1940 DOA 3/7/2020  DR HOANG LANGFORD  ALLERGY [employer]  CONTACT Sp [facility]ades C            REVIEW OF SYMPTOMS      Able to give ROS  Yes     RELIABLE No   CONSTITUTIONAL Weakness Yes  Chills No Vision changes No  ENDOCRINE No unexplained hair loss No heat or cold intolerance    ALLERGY No hives  Sore throat No   RESP Coughing blood no  Shortness of breath YES   NEURO No Headache  Confusion Pain neck No   CARDIAC No Chest pain No Palpitations   GI No Pain abdomen NO   Vomiting NO     PHYSICAL EXAM    HEENT Unremarkable PERRLA atraumatic   RESP Fair air entry EXP prolonged    Harsh breath sound Resp distres mild   CARDIAC S1 S2 No S3     NO JVD    ABDOMEN SOFT BS PRESENT NOT DISTENDED No hepatosplenomegaly PEDAL EDEMA present No calf tenderness  NO rash   GENERAL Not TOXIC looking    VITALS/LABS       3/9/2020 afeb 98 145/88   3/9/2020 W 11.7 Hb 10.3 Plt 238 Na 140 K 4.7 CO2 34 Cr 1.4       PT DATA/BEST PRACTICE  ALLERGY     NOTEWORTHY  POINTS/CHANGES ROS/PE                                  WT   100 (3/9/2020)                  BMI   31 (3/9/2020)  WT  97 (2020)      BMI  30 (2020)                                                       ADVANCED DIRECTIVE       Goals of care discussion                                                                                      HEAD OF BED ELEVATION Yes  DYSPHAGIA EVAL                                  DIET                                         IV F                                                       DVT PROPHYLAXIS        hpsc (3/7)              STRESS ULCER PROPHYLAXIS                                                                  INFECTION PPLX    ECHO      2020 ef 55% pasp 15 dd   3/12/2019 ef 55% dd1 interatrial septum aneurysm   EKG  3/7/2020 S tachy              CXR    3/7/2020 CXR New infeiltrate medial l base                        CT      cta ch 3/7/2020   Sm l pl fluid   Focal consoldatn post l base cw pneu   No pe                                                                           MICROBIO    3/7/2020 Flu ab n RSV n   3/7 blod c n   3/7 urine c n                ABIO      rocephin (3/8)             PROCEDURE                             LINES/TUBES POA                             INFECTION  PNEUMONIA NOTED ON CXR DONE 3/7/2020   W 3/9/2020 W 11.7   X 3/7/2020 CXR New infeiltrate medial l base    A Rocephin (3/8)        COPD ex poa 3/7/2020  M duoneb (3/7)   M symbicort (3/7)   M pred 40 (3/7)   M Montelukast 10 (3/9)   RESP GAS EXCHANGE  3/7/2020 3p bpap 16/6/.6 740/45/186  CAD  M ASA 81 (3/7)   M plavix 75 (3/7)   M metoprolol 12.5x2 (3/7)                         PATIENT SUMMARY      79 m former smoker PMH HTN, DM2 (on home Metformin and glipizide), COPD (2L home/ BIPAP at night), CAD with 3v CABG , Stent 2019 HLD, 10/26/2018 ECHO ef 55% hx hypercapnic resp failure with ho intubation c/b with cardiac arrest (2018) with ho recurrent admissions GOLD D recent admission  2019 and 10/2019 with copd ex ac on chr hypercapnic resp failure  was  admitted with 10/2019 SVT Rxed with adenosine ac hypercapnic resp failure ho pvd ho recent (2019 lle stent   Patient admitted3/2020 with pneumonia and copd ex  Pulm consulted 3/9/2020         PATIENT DATA/ASSESSMENT/RECOMMENDATIONS     PNEUMONIA   A/R 3/9/2020 Clinically improving Cont Rx   COPD ex  A/R 3/9/2020 On BD steroids Cont steroids at least 6 d   CAD   A/R On dapt bb          RESP GAS EXCHANGE   A/R Clinially seems ok                                          TIME SPENT Over 55 minutes aggregate care time spent on encounter; activities included   direct pt care, counseling and/or coordinating care reviewing notes, lab data/ imaging , discussion with multidisciplinary team/ pt /family. Risks, benefits, alternatives  discussed in detail.    [facility] [phone]  1940 DOA 3/7/2020  DR HOANG LANGFORD

## 2020-03-09 NOTE — PROGRESS NOTE ADULT - PROBLEM SELECTOR PROBLEM 1
Diabetes Mellitus, Type II
Acute on chronic respiratory failure
Acute on chronic respiratory failure

## 2020-03-09 NOTE — DISCHARGE NOTE PROVIDER - HOSPITAL COURSE
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 Wife at bedside. Reports patient started to have SOB last night while on BIPAP, wife attributed to the BIPAP machine being "broken" as the numbers on it seemed different. Patient was switched from BIPAP to 2L O2 NC with saturations in the uppers 80s and low 90s. SOB continued throughout the day and decision was made to bring patient to ED. Denies fevers, chills, chest pain, palpitations.         In the ED, vitals were temp, , /63, RR 26, O2 98% on BIPAP    Labs were significant for wbc 15.69, H/H 11.5/36.9, d-dimer 360, glucose 218, alb 3.0, AST 9, trop negative    Flu A/B/RSV negative    ABG 7.40/45/186/26    S/p duoneb x1, solumedrol 125mg IVP        CT angio test was done to r/o PE given symptomatology. CT angio revealed a focal consolidation in the posterior left lung base consistent with pneumonia.        HOSPITAL COURSE: Patient admitted for pneumonia. Started on Ceftriaxone and Zithromax. Started on Prednisone 40 mg daily. Completed a 3 day course of Zithromax and was transitioned from Ceftriaxone to PO Ceftin. Patient gradually improved and is medically stable for discharge home.         T(C): 36.7 (03-09-20 @ 08:06), Max: 36.7 (03-09-20 @ 08:06)    HR: 84 (03-09-20 @ 08:06) (63 - 98)    BP: 103/67 (03-09-20 @ 08:06) (103/67 - 125/69)    RR: 18 (03-09-20 @ 08:06) (16 - 18)    SpO2: 100% (03-09-20 @ 08:06) (95% - 100%)        GENERAL: patient appears well, no acute distress, appropriate, pleasant    EYES: sclera clear, no exudates    ENMT: oropharynx clear without erythema, no exudates, moist mucous membranes    NECK: supple, soft, no thyromegaly noted    LUNGS: good air entry bilaterally, clear to auscultation, symmetric breath sounds, no wheezing or rhonchi appreciated    HEART: soft S1/S2, regular rate and rhythm, no murmurs noted, mild lower extremity edema    GASTROINTESTINAL: abdomen is soft, nontender, nondistended, normoactive bowel sounds, no palpable masses    INTEGUMENT: good skin turgor, no lesions noted    MUSCULOSKELETAL: no clubbing or cyanosis, no obvious deformity    NEUROLOGIC: awake, alert, oriented x3, good muscle tone in 4 extremities, no obvious sensory deficits    PSYCHIATRIC: mood is good, affect is congruent, linear and logical thought process    HEME/LYMPH: no palpable supraclavicular nodules, no obvious ecchymosis or petechiae         CONSULTANTS:    Endocrine - Dr. 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 Wife at bedside. Reports patient started to have SOB last night while on BIPAP, wife attributed to the BIPAP machine being "broken" as the numbers on it seemed different. Patient was switched from BIPAP to 2L O2 NC with saturations in the uppers 80s and low 90s. SOB continued throughout the day and decision was made to bring patient to ED. Denies fevers, chills, chest pain, palpitations.         In the ED, vitals were temp, , /63, RR 26, O2 98% on BIPAP    Labs were significant for wbc 15.69, H/H 11.5/36.9, d-dimer 360, glucose 218, alb 3.0, AST 9, trop negative    Flu A/B/RSV negative    ABG 7.40/45/186/26    S/p duoneb x1, solumedrol 125mg IVP        CT angio test was done to r/o PE given symptomatology. CT angio revealed a focal consolidation in the posterior left lung base consistent with pneumonia.        HOSPITAL COURSE: Patient admitted for pneumonia. Started on Ceftriaxone and Zithromax. Started on Prednisone 40 mg daily. Completed a 3 day course of Zithromax and was transitioned from Ceftriaxone to PO Ceftin. Endo Dr. Perlman was consulted. Patient was started on Lantus 10 U qhs and Humalog 6 U qac for hyperglycemia likely 2/2 steroid use. Patient gradually improved and is medically stable for discharge home.         T(C): 36.7 (03-09-20 @ 08:06), Max: 36.7 (03-09-20 @ 08:06)    HR: 84 (03-09-20 @ 08:06) (63 - 98)    BP: 103/67 (03-09-20 @ 08:06) (103/67 - 125/69)    RR: 18 (03-09-20 @ 08:06) (16 - 18)    SpO2: 100% (03-09-20 @ 08:06) (95% - 100%)        GENERAL: patient appears well, no acute distress, appropriate, pleasant    EYES: sclera clear, no exudates    ENMT: oropharynx clear without erythema, no exudates, moist mucous membranes    NECK: supple, soft, no thyromegaly noted    LUNGS: good air entry bilaterally, clear to auscultation, symmetric breath sounds, no wheezing or rhonchi appreciated    HEART: soft S1/S2, regular rate and rhythm, no murmurs noted, mild lower extremity edema    GASTROINTESTINAL: abdomen is soft, nontender, nondistended, normoactive bowel sounds, no palpable masses    INTEGUMENT: good skin turgor, no lesions noted    MUSCULOSKELETAL: no clubbing or cyanosis, no obvious deformity    NEUROLOGIC: awake, alert, oriented x3, good muscle tone in 4 extremities, no obvious sensory deficits    PSYCHIATRIC: mood is good, affect is congruent, linear and logical thought process    HEME/LYMPH: no palpable supraclavicular nodules, no obvious ecchymosis or petechiae         CONSULTANTS:    Endocrine - Dr. 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 Wife at bedside. Reports patient started to have SOB last night while on BIPAP, wife attributed to the BIPAP machine being "broken" as the numbers on it seemed different. Patient was switched from BIPAP to 2L O2 NC with saturations in the uppers 80s and low 90s. SOB continued throughout the day and decision was made to bring patient to ED. Denies fevers, chills, chest pain, palpitations.         In the ED, vitals were temp, , /63, RR 26, O2 98% on BIPAP    Labs were significant for wbc 15.69, H/H 11.5/36.9, d-dimer 360, glucose 218, alb 3.0, AST 9, trop negative    Flu A/B/RSV negative    ABG 7.40/45/186/26    S/p duoneb x1, solumedrol 125mg IVP        CT angio test was done to r/o PE given symptomatology. CT angio revealed a focal consolidation in the posterior left lung base consistent with pneumonia.        HOSPITAL COURSE: Patient admitted for pneumonia. Started on Ceftriaxone and Zithromax. Started on Prednisone 40 mg daily. Completed a 3 day course of Zithromax and was transitioned from Ceftriaxone to PO Ceftin. Endo Dr. Perlman was consulted. Patient was started on Lantus 10 U qhs and Humalog 6 U qac for hyperglycemia likely 2/2 steroid use. Patient gradually improved and is medically stable for discharge home.         3/9/20: Patient seen and examined at bedside. States he feels well, eager to go home.        T(C): 36.7 (03-09-20 @ 08:06), Max: 36.7 (03-09-20 @ 08:06)    HR: 84 (03-09-20 @ 08:06) (63 - 98)    BP: 103/67 (03-09-20 @ 08:06) (103/67 - 125/69)    RR: 18 (03-09-20 @ 08:06) (16 - 18)    SpO2: 100% (03-09-20 @ 08:06) (95% - 100%)        GENERAL: patient appears well, no acute distress, appropriate, pleasant    EYES: sclera clear, no exudates    ENMT: oropharynx clear without erythema, no exudates, moist mucous membranes    NECK: supple, soft, no thyromegaly noted    LUNGS: good air entry bilaterally, clear to auscultation, symmetric breath sounds, no wheezing or rhonchi appreciated    HEART: soft S1/S2, regular rate and rhythm, no murmurs noted, mild lower extremity edema    GASTROINTESTINAL: abdomen is soft, nontender, nondistended, normoactive bowel sounds, no palpable masses    INTEGUMENT: good skin turgor, no lesions noted    MUSCULOSKELETAL: no clubbing or cyanosis, no obvious deformity    NEUROLOGIC: awake, alert, oriented x3, good muscle tone in 4 extremities, no obvious sensory deficits    PSYCHIATRIC: mood is good, affect is congruent, linear and logical thought process    HEME/LYMPH: no palpable supraclavicular nodules, no obvious ecchymosis or petechiae         CONSULTANTS:    Endocrine - Dr. Perlman    Pulm: Dr. Dejesus        Total time spent on discharge including coordination of care by attending physician: 44 minutes

## 2020-03-09 NOTE — DISCHARGE NOTE PROVIDER - PROVIDER TOKENS
PROVIDER:[TOKEN:[[id number]],ESTABLISHEDPATIENT:[T]] PROVIDER:[TOKEN:[[id number]],ESTABLISHEDPATIENT:[T]],PROVIDER:[TOKEN:[[id number]],FOLLOWUP:[2 weeks]]

## 2020-03-09 NOTE — PROGRESS NOTE ADULT - PROBLEM SELECTOR PLAN 1
cont lantus 10 units qhs  cont humalog 6 units 3x/day before meals  cont mod dose humalog scale coverage  cont cons cho diet  goal bg 100-180 in hosp setting  bg numbers trending down
-Likely 2/2 COPD exacerbation and PNA  -On BIPAP with O2 sat in the upper 90s  -S/p IV solumedrol 125mg  -On prednisone 10mg at home, start Prednisone 40mg daily  -Continue duoneb and symbicort  -D-dimer 368, negative when adjusted for age but will obtain CTA in setting of SOB and tachycardia, f/u- no PE, but positive for PNA  -Check procalcitonin level- mild elevation  -Dr. Dejesus consulted, f/u
-Likely 2/2 COPD exacerbation and PNA  -On BIPAP with O2 sat in the upper 90s  -S/p IV solumedrol 125mg  -On prednisone 10mg at home, start Prednisone 40mg daily  -Continue duoneb and symbicort  -D-dimer 368, negative when adjusted for age but will obtain CTA in setting of SOB and tachycardia, f/u- no PE, but positive for PNA  -Check procalcitonin level- mild elevation  -Dr. Dejesus consulted, f/u

## 2020-03-09 NOTE — CONSULT NOTE ADULT - SUBJECTIVE AND OBJECTIVE BOX
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 Wife at bedside. Reports patient started to have SOB last night while on BIPAP, wife attributed to the BIPAP machine being "broken" as the numbers on it seemed different. Patient was switched from BIPAP to 2L O2 NC with saturations in the uppers 80s and low 90s. SOB continued throughout the day and decision was made to bring patient to ED. Denies fevers, chills, chest pain, palpitations.     In the ED, vitals were temp, , /63, RR 26, O2 98% on BIPAP  Labs were significant for wbc 15.69, H/H 11.5/36.9, d-dimer 360, glucose 218, alb 3.0, AST 9, trop negative  Flu A/B/RSV negative  ABG 7.40/45/186/26  S/p duoneb x1, solumedrol 125mg IVP (07 Mar 2020 05:36)      PAST MEDICAL & SURGICAL HISTORY:  PVD (peripheral vascular disease)  Hypertension  COPD (chronic obstructive pulmonary disease): on 2L at home and BiPAP at night; intubated   Osteomyelitis  Dyslipidemia  CAD (Coronary Artery Disease): s/p 3v CABG ; stents placed in [place] in   Diabetes Mellitus, Type II  S/P primary angioplasty with coronary stent  Compound fracture: left leg  CABG (Coronary Artery Bypass Graft):       FAMILY HISTORY:  Family hx of lung cancer: brother,  age 82, used to smoke with pt  Family history of diabetes mellitus (Sibling)        MEDICATIONS   albuterol/ipratropium for Nebulization 3 milliLiter(s) Nebulizer every 6 hours  ascorbic acid 500 milliGRAM(s) Oral daily  aspirin enteric coated 81 milliGRAM(s) Oral daily  atorvastatin 40 milliGRAM(s) Oral at bedtime  budesonide  80 MICROgram(s)/formoterol 4.5 MICROgram(s) Inhaler 2 Puff(s) Inhalation two times a day  cefTRIAXone   IVPB      cefTRIAXone   IVPB 1000 milliGRAM(s) IV Intermittent every 24 hours  clopidogrel Tablet 75 milliGRAM(s) Oral daily  dextrose 40% Gel 15 Gram(s) Oral once PRN  dextrose 5%. 1000 milliLiter(s) IV Continuous <Continuous>  dextrose 50% Injectable 12.5 Gram(s) IV Push once  dextrose 50% Injectable 25 Gram(s) IV Push once  dextrose 50% Injectable 25 Gram(s) IV Push once  glucagon  Injectable 1 milliGRAM(s) IntraMuscular once PRN  heparin  Injectable 5000 Unit(s) SubCutaneous every 8 hours  insulin glargine Injectable (LANTUS) 10 Unit(s) SubCutaneous at bedtime  insulin lispro (HumaLOG) corrective regimen sliding scale   SubCutaneous Before meals and at bedtime  insulin lispro Injectable (HumaLOG) 6 Unit(s) SubCutaneous three times a day before meals  metoprolol tartrate 12.5 milliGRAM(s) Oral two times a day  montelukast 10 milliGRAM(s) Oral at bedtime  multivitamin 1 Tablet(s) Oral daily  predniSONE   Tablet 40 milliGRAM(s) Oral daily  tamsulosin 0.4 milliGRAM(s) Oral at bedtime      Vital Signs Last 24 Hrs  T(C): 36.7 (09 Mar 2020 12:27), Max: 36.7 (09 Mar 2020 08:06)  T(F): 98.1 (09 Mar 2020 12:27), Max: 98.1 (09 Mar 2020 08:06)  HR: 98 (09 Mar 2020 12:27) (63 - 98)  BP: 145/88 (09 Mar 2020 12:27) (103/67 - 145/88)  BP(mean): --  RR: 18 (09 Mar 2020 12:27) (16 - 18)  SpO2: 100% (09 Mar 2020 12:27) (95% - 100%)      20 @ 07:01  -  20 @ 07:00  --------------------------------------------------------  IN: 730 mL / OUT: 2070 mL / NET: -1340 mL    20 @ 07:01  -  20 @ 13:02  --------------------------------------------------------  IN: 0 mL / OUT: 400 mL / NET: -400 mL            LABS:                        10.3   11.75 )-----------( 238      ( 09 Mar 2020 05:50 )             33.6         140  |  103  |  32<H>  ----------------------------<  215<H>  4.7   |  34<H>  |  1.40<H>    Ca    9.5      09 Mar 2020 05:50                WBC:  WBC Count: 11.75 K/uL ( 05:50)  WBC Count: 15.25 K/uL ( @ 07:53)  WBC Count: 15.69 K/uL ( @ 03:42)      MICROBIOLOGY:  RECENT CULTURES:   .Blood Blood-Peripheral XXXX XXXX   No growth to date.     .Urine Clean Catch (Midstream) XXXX XXXX   <10,000 CFU/mL Normal Urogenital Maria Esther                    Sodium:  Sodium, Serum: 140 mmol/L ( 05:50)  Sodium, Serum: 137 mmol/L ( 07:53)  Sodium, Serum: 137 mmol/L ( 03:42)      1.40 mg/dL  05:50  1.50 mg/dL  07:53  1.30 mg/dL  03:42      Hemoglobin:  Hemoglobin: 10.3 g/dL ( 05:50)  Hemoglobin: 11.0 g/dL ( 07:53)  Hemoglobin: 11.5 g/dL ( 03:42)      Platelets: Platelet Count - Automated: 238 K/uL (03-09 @ 05:50)  Platelet Count - Automated: 229 K/uL ( @ 07:53)  Platelet Count - Automated: 206 K/uL ( @ 03:42)              RADIOLOGY & ADDITIONAL STUDIES:

## 2020-03-09 NOTE — DISCHARGE NOTE PROVIDER - CARE PROVIDER_API CALL
[place] [address]  Phone: [phone]  Fax: [phone]  Established Patient  Follow Up Time: Sarah Avila)  Medicine  [address] [place] [address]  Phone: [phone]  Fax: [phone]  Established Patient  Follow Up Time:     Arun Dejesus)  Critical Care Medicine; Internal Medicine; Pulmonary Disease  [address] [place] [address]  Phone: [phone]  Fax: [phone]  Follow Up Time: 2 weeks

## 2020-03-09 NOTE — DISCHARGE NOTE PROVIDER - NSDCCPCAREPLAN_GEN_ALL_CORE_FT
PRINCIPAL DISCHARGE DIAGNOSIS  Diagnosis: Pneumonia  Assessment and Plan of Treatment: You were found to have pneumonia, and were started on IV Antibiotics. Please continue Ceftin 500 mg twice a day for 4 more days until 3/13/2020.  Please follow up with your PMD in 1-2 days.      SECONDARY DISCHARGE DIAGNOSES  Diagnosis: Diabetes Mellitus, Type II  Assessment and Plan of Treatment: Your sugars were high and you were given insulin while in the hospital. Please re-start your home diabetes medications and follow up with your PMD.    Diagnosis: COPD (chronic obstructive pulmonary disease)  Assessment and Plan of Treatment: Please continue home oxygen and BiPAP at night time. Re-start your home prednisone 10 mg daily. Continue home Breo inhaler.    Diagnosis: CAD (Coronary Artery Disease)  Assessment and Plan of Treatment: Continue home ASA and Plavix    Diagnosis: Asthma  Assessment and Plan of Treatment: Please continue your monthly Nucala injections. Continue home Montelukast.    Diagnosis: Hypertension  Assessment and Plan of Treatment: Continue home Metoprolol and Valsartan    Diagnosis: Dyslipidemia  Assessment and Plan of Treatment: Continue home atorvastatin PRINCIPAL DISCHARGE DIAGNOSIS  Diagnosis: Pneumonia  Assessment and Plan of Treatment: You were found to have pneumonia, and were started on IV Antibiotics. Please continue Ceftin 500 mg twice a day for 4 more days until 3/13/2020. Please continue Prednisone 20 mg daily for 3 more days until 3/12/2020 and then resume your home dose of Prednisone 10 mg daily.  Please follow up with your PMD in 1-2 days.      SECONDARY DISCHARGE DIAGNOSES  Diagnosis: Diabetes Mellitus, Type II  Assessment and Plan of Treatment: Your sugars were high and you were given insulin while in the hospital. Please re-start your home diabetes medications and follow up with your PMD.    Diagnosis: COPD (chronic obstructive pulmonary disease)  Assessment and Plan of Treatment: Please continue home oxygen and BiPAP at night time. Re-start your home prednisone 10 mg daily after finishing the prednisone 20 mg daily for 3 days. Continue home Breo inhaler.    Diagnosis: CAD (Coronary Artery Disease)  Assessment and Plan of Treatment: Continue home ASA and Plavix    Diagnosis: Asthma  Assessment and Plan of Treatment: Please continue your monthly Nucala injections. Continue home Montelukast.    Diagnosis: Hypertension  Assessment and Plan of Treatment: Continue home Metoprolol and Valsartan    Diagnosis: Dyslipidemia  Assessment and Plan of Treatment: Continue home atorvastatin PRINCIPAL DISCHARGE DIAGNOSIS  Diagnosis: Pneumonia  Assessment and Plan of Treatment: You were found to have pneumonia, and were started on IV Antibiotics. Please continue Ceftin 500 mg twice a day for 4 more days until 3/13/2020. Please continue Prednisone 20 mg daily for 3 more days until 3/12/2020 and then resume your home dose of Prednisone 10 mg daily.  Please follow up with your PMD in 1-2 days.      SECONDARY DISCHARGE DIAGNOSES  Diagnosis: Elevated serum creatinine  Assessment and Plan of Treatment: -Elevated during hospital stay but improving, possibly 2/2 dehydration  Encouraged increased oral water intake  -Follow up with PCP within 2 weeks for repeat blood work    Diagnosis: Hypertension  Assessment and Plan of Treatment: Continue home Metoprolol and Valsartan    Diagnosis: COPD (chronic obstructive pulmonary disease)  Assessment and Plan of Treatment: Please continue home oxygen and BiPAP at night time. Re-start your home prednisone 10 mg daily after finishing the prednisone 20 mg daily for 3 days. Continue home Breo inhaler.    Diagnosis: Asthma  Assessment and Plan of Treatment: Please continue your monthly Nucala injections. Continue home Montelukast.    Diagnosis: CAD (Coronary Artery Disease)  Assessment and Plan of Treatment: Continue home ASA and Plavix    Diagnosis: Diabetes Mellitus, Type II  Assessment and Plan of Treatment: Your sugars were high and you were given insulin while in the hospital. Please re-start your home diabetes medications and follow up with your PMD.    Diagnosis: Dyslipidemia  Assessment and Plan of Treatment: Continue home atorvastatin

## 2020-03-09 NOTE — DISCHARGE NOTE PROVIDER - NSDCMRMEDTOKEN_GEN_ALL_CORE_FT
aspirin 81 mg oral delayed release tablet: 1 tab(s) orally once a day  atorvastatin 40 mg oral tablet: 1 tab(s) orally once a day  Breo Ellipta 100 mcg-25 mcg/inh inhalation powder: 1 puff(s) inhaled once a day  clopidogrel 75 mg oral tablet: 1 tab(s) orally once a day  CoQ10 300 mg oral capsule: 1 cap(s) orally once a day  Fish Oil oral capsule: 1 cap(s) orally once a day  glipiZIDE 2.5 mg oral tablet, extended release: 1 tab(s) orally 2 times a day  Incruse Ellipta 62.5 mcg/inh inhalation powder: 1 puff(s) inhaled once a day  metFORMIN 1000 mg oral tablet: 1 tab(s) orally 2 times a day  Metoprolol Tartrate 25 mg oral tablet: 0.5 tab(s) orally 2 times a day  montelukast 10 mg oral tablet: 1 tab(s) orally once a day (at bedtime)  Multiple Vitamins oral tablet: 1 tab(s) orally once a day  Nucala: last injected 2/24  predniSONE 10 mg oral tablet: 1 tab(s) orally once a day  tamsulosin 0.4 mg oral capsule: 1 cap(s) orally once a day (at bedtime)  valsartan 80 mg oral tablet: 1 tab(s) orally once a day  Ventolin HFA 90 mcg/inh inhalation aerosol: 2 puff(s) inhaled 4 times a day, As Needed  Vitamin B Complex 100: 1 tab(s) orally once a day  Vitamin C 500 mg oral tablet: 1 tab(s) orally once a day aspirin 81 mg oral delayed release tablet: 1 tab(s) orally once a day  atorvastatin 40 mg oral tablet: 1 tab(s) orally once a day  Breo Ellipta 100 mcg-25 mcg/inh inhalation powder: 1 puff(s) inhaled once a day  cefuroxime 500 mg oral tablet: 1 tab(s) orally 2 times a day   clopidogrel 75 mg oral tablet: 1 tab(s) orally once a day  CoQ10 300 mg oral capsule: 1 cap(s) orally once a day  Fish Oil oral capsule: 1 cap(s) orally once a day  glipiZIDE 2.5 mg oral tablet, extended release: 1 tab(s) orally 2 times a day  Incruse Ellipta 62.5 mcg/inh inhalation powder: 1 puff(s) inhaled once a day  metFORMIN 1000 mg oral tablet: 1 tab(s) orally 2 times a day  Metoprolol Tartrate 25 mg oral tablet: 0.5 tab(s) orally 2 times a day  montelukast 10 mg oral tablet: 1 tab(s) orally once a day (at bedtime)  Multiple Vitamins oral tablet: 1 tab(s) orally once a day  Nucala: last injected 2/24  predniSONE 10 mg oral tablet: 1 tab(s) orally once a day  tamsulosin 0.4 mg oral capsule: 1 cap(s) orally once a day (at bedtime)  valsartan 80 mg oral tablet: 1 tab(s) orally once a day  Ventolin HFA 90 mcg/inh inhalation aerosol: 2 puff(s) inhaled 4 times a day, As Needed  Vitamin B Complex 100: 1 tab(s) orally once a day  Vitamin C 500 mg oral tablet: 1 tab(s) orally once a day aspirin 81 mg oral delayed release tablet: 1 tab(s) orally once a day  atorvastatin 40 mg oral tablet: 1 tab(s) orally once a day  Breo Ellipta 100 mcg-25 mcg/inh inhalation powder: 1 puff(s) inhaled once a day  cefuroxime 500 mg oral tablet: 1 tab(s) orally 2 times a day   clopidogrel 75 mg oral tablet: 1 tab(s) orally once a day  CoQ10 300 mg oral capsule: 1 cap(s) orally once a day  Deltasone 20 mg oral tablet: 1 tab(s) orally once a day   Fish Oil oral capsule: 1 cap(s) orally once a day  glipiZIDE 2.5 mg oral tablet, extended release: 1 tab(s) orally 2 times a day  Incruse Ellipta 62.5 mcg/inh inhalation powder: 1 puff(s) inhaled once a day  metFORMIN 1000 mg oral tablet: 1 tab(s) orally 2 times a day  Metoprolol Tartrate 25 mg oral tablet: 0.5 tab(s) orally 2 times a day  montelukast 10 mg oral tablet: 1 tab(s) orally once a day (at bedtime)  Multiple Vitamins oral tablet: 1 tab(s) orally once a day  Nucala: last injected 2/24  predniSONE 10 mg oral tablet: 1 tab(s) orally once a day  tamsulosin 0.4 mg oral capsule: 1 cap(s) orally once a day (at bedtime)  valsartan 80 mg oral tablet: 1 tab(s) orally once a day  Ventolin HFA 90 mcg/inh inhalation aerosol: 2 puff(s) inhaled 4 times a day, As Needed  Vitamin B Complex 100: 1 tab(s) orally once a day  Vitamin C 500 mg oral tablet: 1 tab(s) orally once a day

## 2020-03-09 NOTE — DISCHARGE NOTE NURSING/CASE MANAGEMENT/SOCIAL WORK - PATIENT PORTAL LINK FT
You can access the FollowMyHealth Patient Portal offered by [facility] by registering at the following website: [email or web address]. By joining [facility] [employer]’s FollowMyHealth portal, you will also be able to view your health information using other applications (apps) compatible with our system.

## 2020-03-09 NOTE — PHARMACOTHERAPY INTERVENTION NOTE - COMMENTS
Counseled patient(wife at bedside) regarding  discharge medications (cefuroxime, prednisone). Addressed all questions.

## 2020-03-09 NOTE — PROGRESS NOTE ADULT - SUBJECTIVE AND OBJECTIVE BOX
CAPILLARY BLOOD GLUCOSE      POCT Blood Glucose.: 235 mg/dL (09 Mar 2020 07:49)  POCT Blood Glucose.: 386 mg/dL (08 Mar 2020 20:58)  POCT Blood Glucose.: 337 mg/dL (08 Mar 2020 17:06)  POCT Blood Glucose.: 434 mg/dL (08 Mar 2020 11:41)  POCT Blood Glucose.: 428 mg/dL (08 Mar 2020 11:39)      Vital Signs Last 24 Hrs  T(C): 36.7 (09 Mar 2020 08:06), Max: 36.7 (09 Mar 2020 08:06)  T(F): 98.1 (09 Mar 2020 08:06), Max: 98.1 (09 Mar 2020 08:06)  HR: 84 (09 Mar 2020 08:06) (63 - 98)  BP: 103/67 (09 Mar 2020 08:06) (103/67 - 125/69)  BP(mean): --  RR: 18 (09 Mar 2020 08:06) (16 - 18)  SpO2: 100% (09 Mar 2020 08:06) (95% - 100%)    General: WN/WD NAD  Respiratory: CTA B/L  CV: RRR, S1S2, no murmurs, rubs or gallops  Abdominal: Soft, NT, ND +BS, Last BM  Extremities: No edema, + peripheral pulses     03-09    140  |  103  |  32<H>  ----------------------------<  215<H>  4.7   |  34<H>  |  1.40<H>    Ca    9.5      09 Mar 2020 05:50        atorvastatin 40 milliGRAM(s) Oral at bedtime  dextrose 40% Gel 15 Gram(s) Oral once PRN  dextrose 50% Injectable 12.5 Gram(s) IV Push once  dextrose 50% Injectable 25 Gram(s) IV Push once  dextrose 50% Injectable 25 Gram(s) IV Push once  glucagon  Injectable 1 milliGRAM(s) IntraMuscular once PRN  insulin glargine Injectable (LANTUS) 10 Unit(s) SubCutaneous at bedtime  insulin lispro (HumaLOG) corrective regimen sliding scale   SubCutaneous Before meals and at bedtime  insulin lispro Injectable (HumaLOG) 6 Unit(s) SubCutaneous three times a day before meals  predniSONE   Tablet 40 milliGRAM(s) Oral daily

## 2020-03-09 NOTE — DISCHARGE NOTE PROVIDER - CARE PROVIDERS DIRECT ADDRESSES
[email or web address]

## 2020-03-17 ENCOUNTER — APPOINTMENT (OUTPATIENT)
Dept: PULMONOLOGY | Facility: CLINIC | Age: 80
End: 2020-03-17

## 2020-03-17 RX ORDER — PREDNISONE 10 MG/1
10 TABLET ORAL
Refills: 0 | Status: DISCONTINUED | COMMUNITY
End: 2020-03-17

## 2020-04-14 NOTE — ED PROVIDER NOTE - CARE PLAN
Quality 130: Documentation Of Current Medications In The Medical Record: Current Medications Documented
Detail Level: Detailed
Principal Discharge DX:	COPD exacerbation

## 2020-04-20 ENCOUNTER — APPOINTMENT (OUTPATIENT)
Dept: VASCULAR SURGERY | Facility: CLINIC | Age: 80
End: 2020-04-20

## 2020-05-06 ENCOUNTER — RX RENEWAL (OUTPATIENT)
Age: 80
End: 2020-05-06

## 2020-05-08 NOTE — ED ADULT TRIAGE NOTE - NSWEIGHTCALCTOOLDRUG_GEN_A_CORE
[Normal Appearance] : normal appearance [General Appearance - In No Acute Distress] : no acute distress [Prostate Tenderness] : the prostate was not tender [No Prostate Nodules] : no prostate nodules [Prostate Size ___ (0-4)] : prostate size [unfilled] (scale: 0-4) [Oriented To Time, Place, And Person] : oriented to person, place, and time [] : no respiratory distress [Edema] : no peripheral edema [Normal Station and Gait] : the gait and station were normal for the patient's age  used

## 2020-06-16 ENCOUNTER — APPOINTMENT (OUTPATIENT)
Dept: PULMONOLOGY | Facility: CLINIC | Age: 80
End: 2020-06-16
Payer: MEDICARE

## 2020-06-16 DIAGNOSIS — I25.10 ATHEROSCLEROTIC HEART DISEASE OF NATIVE CORONARY ARTERY W/OUT ANGINA PECTORIS: ICD-10-CM

## 2020-06-16 PROCEDURE — 99214 OFFICE O/P EST MOD 30 MIN: CPT | Mod: 95

## 2020-06-16 RX ORDER — VALSARTAN 80 MG/1
80 TABLET, COATED ORAL
Refills: 0 | Status: DISCONTINUED | COMMUNITY
End: 2020-06-16

## 2020-06-16 NOTE — REVIEW OF SYSTEMS
[Fever] : no fever [Cough] : no cough [Fatigue] : no fatigue [Chills] : no chills [Wheezing] : no wheezing

## 2020-06-16 NOTE — ASSESSMENT
[FreeTextEntry1] : Mr. 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 Has not had any exacerbations or hospitalizations since March and is quarantining well during pandemic.\par \par 1. 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 There is severely reduced diffusing capacity\par - ABG in March 2020 with improved hypercapnia (7.40/45)\par - CBC with diff with absolute eos of 180, but he is on prednisone. IgE elevated to 118, and [place] allergen profile positive for house dust. Aspergillus IgE negative. Alpha-1 antitrypsin normal and HIV negative\par - Given bronchial hyperreactivity and peripheral eosinophilia, I suspect Asthma-COPD Overlap Syndrome\par - Continue monthly Mepolizumab injections\par - Continue with Breo Ellipta 200-25 mcg once daily (rinse after use) + Incruse Ellipta 62.5 mcg daily\par - Continue montelukast 10 mg at bedtime\par - Prednisone now decreased to 5 mg daily. Will taper off very slowly (decrease by 1 mg every month)\par - Ventolin prn with spacer (previously provided)\par - Keep off roflumilast given no chronic bronchitis symptoms\par - Resume pulmonary rehab after COVID pandemic\par - Recommend daily regular exercise with bike/treadmill at home at least 30 minutes twice daily\par - Continue breathing exercises and low carbohydrate diet \par - Reconsider BLVR if RV remains >200% given improved PCO2 to 45 in March 2020. Consider thoracic surgery evaluation\par \par 2. Chronic hypoxemia and hypercapnic respiratory failure:\par - Likely due to asthma/COPD, although interestingly A-a gradient on ABG on room air (Jan 2020) was normal (I suspect that he did not have enough time off oxygen prior to ABG)\par - Continue supplemental oxygen with nasal cannula 2-3 LPM with exercise. Wife reports oxygen saturation 94-96% at rest and minimal exertion\par - BiPAP every night, IPAP 18, EPAP 8\par \par 3. Left lower lobe pneumonia:\par - Resolved with antibiotics\par - Continue quarantine and wearing masks during COVID pandemic\par - Repeat CT chest to evaluate for resolution\par - 6MWD is 132 meters (but he had to use the bathroom). Exercise oximetry required 2 LPM with exercise\par \par 4. Bilateral leg edema:\par - Likely related to diastolic dysfunction. LV systolic function normal and there is no significant valvular disease\par - No evidence of pulmonary hypertension on recent 2D-echo in January 2020\par - Continue leg elevation while sitting down during the day\par - Knee-high compression stockings\par - Check kidney function and electrolytes with PMD this Friday. If stable, consider low-dose furosemide\par \par 5. Upper airway cough syndrome:\par - Continue fluticasone nasal spray, 1-2 sprays per nostril twice daily\par \par 6. HCM:\par - Up-to-date with influenza and pneumococcal vaccinations\par - Self-injecting Mepolizumab at home\par \par 7. Follow-up in 1-2 months or sooner prn

## 2020-06-16 NOTE — HISTORY OF PRESENT ILLNESS
[FreeTextEntry1] : Mr. 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 Cell # is [phone].\par \par He takes prednisone 5 mg every day. He is adherent with BiPAP (IPAP 18, EPAP 8) every night. He was able to visit podiatry to have his nails trimmed yesterday. He noticed bilateral foot swelling for 1 month. He only wears oxygen with going up and down the stairs. Oxygen saturation drops to lowest 86-88% when he walks without the oxygen. At rest or with light exertion, oxygen saturation is 94-96%. He is adherent with Nucala every 4 weeks. He connects 2 liters oxygen with BiPAP every night. He is adherent with Breo & Incruse ellipta, montelukast, and albuterol prn. \par \par Last 2D-echo in January 2020 with mild diastolic dysfunction, normal LV systolic function, no significant valvular disease, and no pulmonary hypertension (estimated RVSP is 15). \par \par ABG in March 2020 with compensated hypercapnia (7.40/45). Baseline PCO2 previously in the upper 50s. \par \par Pulmonary rehab currently on pause due to COVID pandemic. \par \par PFTs (Dec 2019) with very severe obstruction with bronchodilator response (NOT reversible). TLC is normal, but RV is increased to 206%. Diffusion capacity is severely reduced.\par \par Exercise oximetry (Jan 2020) with hypoxemia at rest to 86% requiring 2L NC. Oxygen requirement with exercise also 2 LPM, but he was only able to exercise for 4 minutes prior to needing to use the bathroom.\par \par 6MWD (Jan 2020) is 132 meters.\par \par Last CT chest in March 2020 with consolidation in the posterior left lung base consistent with pneumonia with adjacent small left pleural effusion. There is emphysema. No pulmonary embolism. \par \par Regarding his smoking history, he quit smoking in the 1980s, used to smoke 1 ppd for 30 years. Was smoking marijuana about 3-4 joints 3x/week until 2017. No alcohol use. No other drug use

## 2020-06-24 ENCOUNTER — APPOINTMENT (OUTPATIENT)
Dept: CT IMAGING | Facility: CLINIC | Age: 80
End: 2020-06-24
Payer: MEDICARE

## 2020-06-24 ENCOUNTER — OUTPATIENT (OUTPATIENT)
Dept: OUTPATIENT SERVICES | Facility: HOSPITAL | Age: 80
LOS: 1 days | End: 2020-06-24
Payer: COMMERCIAL

## 2020-06-24 DIAGNOSIS — Z95.5 PRESENCE OF CORONARY ANGIOPLASTY IMPLANT AND GRAFT: Chronic | ICD-10-CM

## 2020-06-24 DIAGNOSIS — J18.9 PNEUMONIA, UNSPECIFIED ORGANISM: ICD-10-CM

## 2020-06-24 DIAGNOSIS — T14.8XXA OTHER INJURY OF UNSPECIFIED BODY REGION, INITIAL ENCOUNTER: Chronic | ICD-10-CM

## 2020-06-24 PROCEDURE — 71250 CT THORAX DX C-: CPT

## 2020-06-24 PROCEDURE — 71250 CT THORAX DX C-: CPT | Mod: 26

## 2020-07-07 ENCOUNTER — APPOINTMENT (OUTPATIENT)
Dept: PULMONOLOGY | Facility: CLINIC | Age: 80
End: 2020-07-07
Payer: MEDICARE

## 2020-07-07 VITALS
OXYGEN SATURATION: 96 % | HEART RATE: 100 BPM | WEIGHT: 225 LBS | RESPIRATION RATE: 16 BRPM | DIASTOLIC BLOOD PRESSURE: 80 MMHG | SYSTOLIC BLOOD PRESSURE: 158 MMHG | HEIGHT: 70 IN | TEMPERATURE: 97.4 F | BODY MASS INDEX: 32.21 KG/M2

## 2020-07-07 DIAGNOSIS — J18.9 PNEUMONIA, UNSPECIFIED ORGANISM: ICD-10-CM

## 2020-07-07 DIAGNOSIS — I50.9 HEART FAILURE, UNSPECIFIED: ICD-10-CM

## 2020-07-07 PROCEDURE — 99214 OFFICE O/P EST MOD 30 MIN: CPT

## 2020-07-07 RX ORDER — PREDNISONE 5 MG/1
5 TABLET ORAL
Qty: 90 | Refills: 3 | Status: DISCONTINUED | COMMUNITY
Start: 2020-03-17 | End: 2020-07-07

## 2020-07-07 NOTE — ASSESSMENT
[FreeTextEntry1] : Mr. 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 He has not had any exacerbations or hospitalizations since March and is quarantining well during pandemic. Doing well except for sedentary lifestyle and high salt diet. Recently suffered loss of eldest son in June due to DM & CHF.\par \par 1. 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 There is severely reduced diffusing capacity\par - ABG in March 2020 with improved hypercapnia (7.40/45)\par - CBC with diff in Dec 2019 with absolute eos of 180, but he is on prednisone. IgE elevated to 118, and [place] allergen profile positive for house dust. Aspergillus IgE negative. Alpha-1 antitrypsin normal and HIV negative\par - Given bronchial hyperreactivity and peripheral eosinophilia, I suspect Asthma-COPD Overlap Syndrome\par - Continue monthly Mepolizumab injections\par - Continue with Breo Ellipta 200-25 mcg once daily (rinse after use) + Incruse Ellipta 62.5 mcg daily\par - Continue montelukast 10 mg at bedtime\par - Keep prednisone at 1 mg daily as wife recently tapered off in the last month\par - Ventolin prn with spacer, has not required to use recently\par - Keep off roflumilast given no chronic bronchitis symptoms\par - Resume pulmonary rehab after COVID pandemic\par - Recommend daily regular exercise. Can take a walk outside in the early morning and evening, increase endurance as tolerates. Also has a bike and treadmill in the house that he can use\par - Continue breathing exercises and low carbohydrate diet \par - Reconsider BLVR if RV remains >200% given improved PCO2 to 45 in March 2020\par \par 2. Chronic hypoxemia and hypercapnic respiratory failure:\par - Likely due to asthma/COPD, although interestingly A-a gradient on ABG on room air (Jan 2020) was normal (I suspect that he did not have enough time off oxygen prior to ABG)\par - Continue supplemental oxygen with nasal cannula 2-3 LPM with exercise. Oxygen saturation today with walking in the hallway only dropped to 92%\par - BiPAP every night, IPAP 18, EPAP 8\par - 6MWD is 132 meters (but he had to use the bathroom). Exercise oximetry required 2 LPM with exercise\par \par 3. Resolved left lower lobe pneumonia:\par - CT with noted resolution of LLL pneumonia, now with residual atelectasis\par - New atelectasis at the right base\par - Repeat CT chest in 6-12 months\par \par 4. Bilateral leg edema:\par - Likely related to high salt diet vs. diastolic dysfunction. LV systolic function normal and there is no significant valvular disease. I recommended low salt diet. Keep legs elevated while sitting down. Re-try compression stockings. If no improvement in leg edema with low salt diet, consider low dose furosemide. Wife will send me recent labs (done at [facility] in [place])\par - No evidence of pulmonary hypertension on recent 2D-echo in January 2020\par \par 5. Upper airway cough syndrome:\par - Continue fluticasone nasal spray, 1-2 sprays per nostril twice daily\par \par 6. HCM:\par - Up-to-date with influenza and pneumococcal vaccinations\par - Self-injecting Mepolizumab at home\par \par 7. Follow-up in 1-2 months or sooner prn

## 2020-07-07 NOTE — REVIEW OF SYSTEMS
[Dyspnea] : dyspnea [Edema] : ~T edema was present [Hypertension] : ~T hypertension [Diabetes] : diabetes mellitus [Negative] : Sleep Disorder [Chills] : no chills [Fever] : no fever [Cough] : no cough [Fatigue] : no fatigue [Wheezing] : no wheezing

## 2020-07-07 NOTE — PHYSICAL EXAM
[General Appearance - Well Developed] : well developed [General Appearance - Well Nourished] : well nourished [Normal Appearance] : normal appearance [Well Groomed] : well groomed [Normal Conjunctiva] : the conjunctiva exhibited no abnormalities [General Appearance - In No Acute Distress] : no acute distress [Neck Appearance] : the appearance of the neck was normal [Normal Oropharynx] : normal oropharynx [Neck Cervical Mass (___cm)] : no neck mass was observed [Jugular Venous Distention Increased] : there was no jugular-venous distention [Heart Sounds] : normal S1 and S2 [Heart Rate And Rhythm] : heart rate and rhythm were normal [Murmurs] : no murmurs present [Arterial Pulses Normal] : the arterial pulses were normal [Respiration, Rhythm And Depth] : normal respiratory rhythm and effort [Exaggerated Use Of Accessory Muscles For Inspiration] : no accessory muscle use [Abdomen Soft] : soft [Abnormal Walk] : normal gait [Abdomen Tenderness] : non-tender [Nail Clubbing] : no clubbing of the fingernails [Cyanosis, Localized] : no localized cyanosis [Oriented To Time, Place, And Person] : oriented to person, place, and time [Cranial Nerves] : cranial nerves 2-12 were intact [Motor Exam] : the motor exam was normal [Affect] : the affect was normal [Mood] : the mood was normal [Skin Color & Pigmentation] : normal skin color and pigmentation [Skin Lesions] : no skin lesions [] : no rash [FreeTextEntry1] : decreased air entry bilaterally; end-inspiratory squeaks auscultated anteriorly; no wheezing auscultated

## 2020-08-01 ENCOUNTER — INPATIENT (INPATIENT)
Facility: HOSPITAL | Age: 80
LOS: 1 days | Discharge: HOME | DRG: 190 | End: 2020-08-03
Attending: INTERNAL MEDICINE | Admitting: INTERNAL MEDICINE
Payer: COMMERCIAL

## 2020-08-01 ENCOUNTER — TRANSCRIPTION ENCOUNTER (OUTPATIENT)
Age: 80
End: 2020-08-01

## 2020-08-01 VITALS — WEIGHT: 225.09 LBS

## 2020-08-01 DIAGNOSIS — T14.8XXA OTHER INJURY OF UNSPECIFIED BODY REGION, INITIAL ENCOUNTER: Chronic | ICD-10-CM

## 2020-08-01 DIAGNOSIS — E11.9 TYPE 2 DIABETES MELLITUS WITHOUT COMPLICATIONS: ICD-10-CM

## 2020-08-01 DIAGNOSIS — Z95.5 PRESENCE OF CORONARY ANGIOPLASTY IMPLANT AND GRAFT: Chronic | ICD-10-CM

## 2020-08-01 DIAGNOSIS — I73.9 PERIPHERAL VASCULAR DISEASE, UNSPECIFIED: ICD-10-CM

## 2020-08-01 DIAGNOSIS — J96.92 RESPIRATORY FAILURE, UNSPECIFIED WITH HYPERCAPNIA: ICD-10-CM

## 2020-08-01 DIAGNOSIS — Z29.9 ENCOUNTER FOR PROPHYLACTIC MEASURES, UNSPECIFIED: ICD-10-CM

## 2020-08-01 DIAGNOSIS — J44.1 CHRONIC OBSTRUCTIVE PULMONARY DISEASE WITH (ACUTE) EXACERBATION: ICD-10-CM

## 2020-08-01 DIAGNOSIS — I10 ESSENTIAL (PRIMARY) HYPERTENSION: ICD-10-CM

## 2020-08-01 DIAGNOSIS — I25.10 ATHEROSCLEROTIC HEART DISEASE OF NATIVE CORONARY ARTERY WITHOUT ANGINA PECTORIS: ICD-10-CM

## 2020-08-01 DIAGNOSIS — E78.5 HYPERLIPIDEMIA, UNSPECIFIED: ICD-10-CM

## 2020-08-01 LAB
ALBUMIN SERPL ELPH-MCNC: 4 G/DL — SIGNIFICANT CHANGE UP (ref 3.3–5)
ALP SERPL-CCNC: 124 U/L — HIGH (ref 40–120)
ALT FLD-CCNC: 20 U/L — SIGNIFICANT CHANGE UP (ref 12–78)
ANION GAP SERPL CALC-SCNC: 4 MMOL/L — LOW (ref 5–17)
AST SERPL-CCNC: 20 U/L — SIGNIFICANT CHANGE UP (ref 15–37)
BASE EXCESS BLDA CALC-SCNC: 0.8 MMOL/L — SIGNIFICANT CHANGE UP (ref -2–2)
BASOPHILS # BLD AUTO: 0.02 K/UL — SIGNIFICANT CHANGE UP (ref 0–0.2)
BASOPHILS NFR BLD AUTO: 0.2 % — SIGNIFICANT CHANGE UP (ref 0–2)
BILIRUB SERPL-MCNC: 0.4 MG/DL — SIGNIFICANT CHANGE UP (ref 0.2–1.2)
BLOOD GAS COMMENTS ARTERIAL: SIGNIFICANT CHANGE UP
BUN SERPL-MCNC: 17 MG/DL — SIGNIFICANT CHANGE UP (ref 7–23)
CALCIUM SERPL-MCNC: 10 MG/DL — SIGNIFICANT CHANGE UP (ref 8.5–10.1)
CHLORIDE SERPL-SCNC: 105 MMOL/L — SIGNIFICANT CHANGE UP (ref 96–108)
CO2 SERPL-SCNC: 33 MMOL/L — HIGH (ref 22–31)
CREAT SERPL-MCNC: 1.3 MG/DL — SIGNIFICANT CHANGE UP (ref 0.5–1.3)
EOSINOPHIL # BLD AUTO: 0 K/UL — SIGNIFICANT CHANGE UP (ref 0–0.5)
EOSINOPHIL NFR BLD AUTO: 0 % — SIGNIFICANT CHANGE UP (ref 0–6)
GLUCOSE SERPL-MCNC: 195 MG/DL — HIGH (ref 70–99)
HCO3 BLDA-SCNC: 24 MMOL/L — SIGNIFICANT CHANGE UP (ref 23–27)
HCT VFR BLD CALC: 44.6 % — SIGNIFICANT CHANGE UP (ref 39–50)
HGB BLD-MCNC: 13.4 G/DL — SIGNIFICANT CHANGE UP (ref 13–17)
HOROWITZ INDEX BLDA+IHG-RTO: 40 — SIGNIFICANT CHANGE UP
IMM GRANULOCYTES NFR BLD AUTO: 0.6 % — SIGNIFICANT CHANGE UP (ref 0–1.5)
LYMPHOCYTES # BLD AUTO: 1.3 K/UL — SIGNIFICANT CHANGE UP (ref 1–3.3)
LYMPHOCYTES # BLD AUTO: 10 % — LOW (ref 13–44)
MCHC RBC-ENTMCNC: 26.5 PG — LOW (ref 27–34)
MCHC RBC-ENTMCNC: 30 GM/DL — LOW (ref 32–36)
MCV RBC AUTO: 88.3 FL — SIGNIFICANT CHANGE UP (ref 80–100)
MONOCYTES # BLD AUTO: 0.92 K/UL — HIGH (ref 0–0.9)
MONOCYTES NFR BLD AUTO: 7.1 % — SIGNIFICANT CHANGE UP (ref 2–14)
NEUTROPHILS # BLD AUTO: 10.68 K/UL — HIGH (ref 1.8–7.4)
NEUTROPHILS NFR BLD AUTO: 82.1 % — HIGH (ref 43–77)
NRBC # BLD: 0 /100 WBCS — SIGNIFICANT CHANGE UP (ref 0–0)
NT-PROBNP SERPL-SCNC: 193 PG/ML — SIGNIFICANT CHANGE UP (ref 0–450)
PCO2 BLDA: 67 MMHG — HIGH (ref 32–46)
PH BLDA: 7.24 — LOW (ref 7.35–7.45)
PLATELET # BLD AUTO: 283 K/UL — SIGNIFICANT CHANGE UP (ref 150–400)
PO2 BLDA: 223 MMHG — HIGH (ref 74–108)
POTASSIUM SERPL-MCNC: 5 MMOL/L — SIGNIFICANT CHANGE UP (ref 3.5–5.3)
POTASSIUM SERPL-SCNC: 5 MMOL/L — SIGNIFICANT CHANGE UP (ref 3.5–5.3)
PROT SERPL-MCNC: 7.9 G/DL — SIGNIFICANT CHANGE UP (ref 6–8.3)
RAPID RVP RESULT: SIGNIFICANT CHANGE UP
RBC # BLD: 5.05 M/UL — SIGNIFICANT CHANGE UP (ref 4.2–5.8)
RBC # FLD: 14 % — SIGNIFICANT CHANGE UP (ref 10.3–14.5)
SAO2 % BLDA: 99 % — HIGH (ref 92–96)
SARS-COV-2 RNA SPEC QL NAA+PROBE: SIGNIFICANT CHANGE UP
SODIUM SERPL-SCNC: 142 MMOL/L — SIGNIFICANT CHANGE UP (ref 135–145)
TROPONIN I SERPL-MCNC: <.015 NG/ML — SIGNIFICANT CHANGE UP (ref 0.01–0.04)
WBC # BLD: 13 K/UL — HIGH (ref 3.8–10.5)
WBC # FLD AUTO: 13 K/UL — HIGH (ref 3.8–10.5)

## 2020-08-01 PROCEDURE — 99223 1ST HOSP IP/OBS HIGH 75: CPT | Mod: GC

## 2020-08-01 PROCEDURE — 99291 CRITICAL CARE FIRST HOUR: CPT

## 2020-08-01 PROCEDURE — 71045 X-RAY EXAM CHEST 1 VIEW: CPT | Mod: 26

## 2020-08-01 PROCEDURE — 93010 ELECTROCARDIOGRAM REPORT: CPT

## 2020-08-01 RX ORDER — METOPROLOL TARTRATE 50 MG
12.5 TABLET ORAL
Refills: 0 | Status: DISCONTINUED | OUTPATIENT
Start: 2020-08-01 | End: 2020-08-03

## 2020-08-01 RX ORDER — HEPARIN SODIUM 5000 [USP'U]/ML
5000 INJECTION INTRAVENOUS; SUBCUTANEOUS EVERY 8 HOURS
Refills: 0 | Status: DISCONTINUED | OUTPATIENT
Start: 2020-08-01 | End: 2020-08-03

## 2020-08-01 RX ORDER — INSULIN LISPRO 100/ML
VIAL (ML) SUBCUTANEOUS EVERY 6 HOURS
Refills: 0 | Status: DISCONTINUED | OUTPATIENT
Start: 2020-08-01 | End: 2020-08-01

## 2020-08-01 RX ORDER — IPRATROPIUM/ALBUTEROL SULFATE 18-103MCG
3 AEROSOL WITH ADAPTER (GRAM) INHALATION EVERY 4 HOURS
Refills: 0 | Status: DISCONTINUED | OUTPATIENT
Start: 2020-08-01 | End: 2020-08-01

## 2020-08-01 RX ORDER — SODIUM CHLORIDE 9 MG/ML
1000 INJECTION, SOLUTION INTRAVENOUS
Refills: 0 | Status: DISCONTINUED | OUTPATIENT
Start: 2020-08-01 | End: 2020-08-03

## 2020-08-01 RX ORDER — FUROSEMIDE 40 MG
20 TABLET ORAL ONCE
Refills: 0 | Status: COMPLETED | OUTPATIENT
Start: 2020-08-01 | End: 2020-08-01

## 2020-08-01 RX ORDER — GLUCAGON INJECTION, SOLUTION 0.5 MG/.1ML
1 INJECTION, SOLUTION SUBCUTANEOUS ONCE
Refills: 0 | Status: DISCONTINUED | OUTPATIENT
Start: 2020-08-01 | End: 2020-08-03

## 2020-08-01 RX ORDER — AZITHROMYCIN 500 MG/1
TABLET, FILM COATED ORAL
Refills: 0 | Status: DISCONTINUED | OUTPATIENT
Start: 2020-08-01 | End: 2020-08-03

## 2020-08-01 RX ORDER — AZITHROMYCIN 500 MG/1
500 TABLET, FILM COATED ORAL ONCE
Refills: 0 | Status: COMPLETED | OUTPATIENT
Start: 2020-08-01 | End: 2020-08-01

## 2020-08-01 RX ORDER — CHLORHEXIDINE GLUCONATE 213 G/1000ML
1 SOLUTION TOPICAL
Refills: 0 | Status: DISCONTINUED | OUTPATIENT
Start: 2020-08-01 | End: 2020-08-03

## 2020-08-01 RX ORDER — ATORVASTATIN CALCIUM 80 MG/1
40 TABLET, FILM COATED ORAL AT BEDTIME
Refills: 0 | Status: DISCONTINUED | OUTPATIENT
Start: 2020-08-01 | End: 2020-08-03

## 2020-08-01 RX ORDER — ALBUTEROL 90 UG/1
2.5 AEROSOL, METERED ORAL ONCE
Refills: 0 | Status: COMPLETED | OUTPATIENT
Start: 2020-08-01 | End: 2020-08-01

## 2020-08-01 RX ORDER — ALBUTEROL 90 UG/1
2.5 AEROSOL, METERED ORAL
Refills: 0 | Status: DISCONTINUED | OUTPATIENT
Start: 2020-08-01 | End: 2020-08-03

## 2020-08-01 RX ORDER — IPRATROPIUM/ALBUTEROL SULFATE 18-103MCG
3 AEROSOL WITH ADAPTER (GRAM) INHALATION ONCE
Refills: 0 | Status: COMPLETED | OUTPATIENT
Start: 2020-08-01 | End: 2020-08-01

## 2020-08-01 RX ORDER — ASCORBIC ACID 60 MG
1 TABLET,CHEWABLE ORAL
Qty: 0 | Refills: 0 | DISCHARGE

## 2020-08-01 RX ORDER — VALSARTAN 80 MG/1
1 TABLET ORAL
Qty: 0 | Refills: 0 | DISCHARGE

## 2020-08-01 RX ORDER — IPRATROPIUM/ALBUTEROL SULFATE 18-103MCG
3 AEROSOL WITH ADAPTER (GRAM) INHALATION EVERY 6 HOURS
Refills: 0 | Status: DISCONTINUED | OUTPATIENT
Start: 2020-08-01 | End: 2020-08-03

## 2020-08-01 RX ORDER — UBIDECARENONE 100 MG
1 CAPSULE ORAL
Qty: 0 | Refills: 0 | DISCHARGE

## 2020-08-01 RX ORDER — PANTOPRAZOLE SODIUM 20 MG/1
40 TABLET, DELAYED RELEASE ORAL DAILY
Refills: 0 | Status: DISCONTINUED | OUTPATIENT
Start: 2020-08-01 | End: 2020-08-03

## 2020-08-01 RX ORDER — ALBUTEROL 90 UG/1
2.5 AEROSOL, METERED ORAL
Refills: 0 | Status: COMPLETED | OUTPATIENT
Start: 2020-08-01 | End: 2020-08-01

## 2020-08-01 RX ORDER — METOPROLOL TARTRATE 50 MG
0.5 TABLET ORAL
Qty: 0 | Refills: 0 | DISCHARGE

## 2020-08-01 RX ORDER — OMEGA-3 ACID ETHYL ESTERS 1 G
1 CAPSULE ORAL
Qty: 0 | Refills: 0 | DISCHARGE

## 2020-08-01 RX ORDER — CLOPIDOGREL BISULFATE 75 MG/1
75 TABLET, FILM COATED ORAL DAILY
Refills: 0 | Status: DISCONTINUED | OUTPATIENT
Start: 2020-08-02 | End: 2020-08-03

## 2020-08-01 RX ORDER — DEXTROSE 50 % IN WATER 50 %
15 SYRINGE (ML) INTRAVENOUS ONCE
Refills: 0 | Status: DISCONTINUED | OUTPATIENT
Start: 2020-08-01 | End: 2020-08-03

## 2020-08-01 RX ORDER — TAMSULOSIN HYDROCHLORIDE 0.4 MG/1
0.4 CAPSULE ORAL AT BEDTIME
Refills: 0 | Status: DISCONTINUED | OUTPATIENT
Start: 2020-08-01 | End: 2020-08-03

## 2020-08-01 RX ORDER — INSULIN LISPRO 100/ML
VIAL (ML) SUBCUTANEOUS
Refills: 0 | Status: DISCONTINUED | OUTPATIENT
Start: 2020-08-01 | End: 2020-08-03

## 2020-08-01 RX ORDER — DEXTROSE 50 % IN WATER 50 %
12.5 SYRINGE (ML) INTRAVENOUS ONCE
Refills: 0 | Status: DISCONTINUED | OUTPATIENT
Start: 2020-08-01 | End: 2020-08-03

## 2020-08-01 RX ORDER — MONTELUKAST 4 MG/1
10 TABLET, CHEWABLE ORAL DAILY
Refills: 0 | Status: DISCONTINUED | OUTPATIENT
Start: 2020-08-02 | End: 2020-08-03

## 2020-08-01 RX ORDER — AZITHROMYCIN 500 MG/1
500 TABLET, FILM COATED ORAL EVERY 24 HOURS
Refills: 0 | Status: DISCONTINUED | OUTPATIENT
Start: 2020-08-02 | End: 2020-08-03

## 2020-08-01 RX ORDER — LORATADINE 10 MG/1
10 TABLET ORAL DAILY
Refills: 0 | Status: DISCONTINUED | OUTPATIENT
Start: 2020-08-01 | End: 2020-08-03

## 2020-08-01 RX ORDER — BUDESONIDE AND FORMOTEROL FUMARATE DIHYDRATE 160; 4.5 UG/1; UG/1
2 AEROSOL RESPIRATORY (INHALATION)
Refills: 0 | Status: DISCONTINUED | OUTPATIENT
Start: 2020-08-01 | End: 2020-08-03

## 2020-08-01 RX ORDER — MEPOLIZUMAB 100 MG/ML
0 INJECTION, SOLUTION SUBCUTANEOUS
Qty: 0 | Refills: 0 | DISCHARGE

## 2020-08-01 RX ADMIN — ATORVASTATIN CALCIUM 40 MILLIGRAM(S): 80 TABLET, FILM COATED ORAL at 21:02

## 2020-08-01 RX ADMIN — Medication 3 MILLILITER(S): at 12:08

## 2020-08-01 RX ADMIN — ALBUTEROL 2.5 MILLIGRAM(S): 90 AEROSOL, METERED ORAL at 12:00

## 2020-08-01 RX ADMIN — Medication 3 MILLILITER(S): at 16:40

## 2020-08-01 RX ADMIN — PANTOPRAZOLE SODIUM 40 MILLIGRAM(S): 20 TABLET, DELAYED RELEASE ORAL at 18:02

## 2020-08-01 RX ADMIN — AZITHROMYCIN 255 MILLIGRAM(S): 500 TABLET, FILM COATED ORAL at 14:18

## 2020-08-01 RX ADMIN — Medication 125 MILLIGRAM(S): at 12:02

## 2020-08-01 RX ADMIN — TAMSULOSIN HYDROCHLORIDE 0.4 MILLIGRAM(S): 0.4 CAPSULE ORAL at 21:02

## 2020-08-01 RX ADMIN — HEPARIN SODIUM 5000 UNIT(S): 5000 INJECTION INTRAVENOUS; SUBCUTANEOUS at 14:23

## 2020-08-01 RX ADMIN — ALBUTEROL 2.5 MILLIGRAM(S): 90 AEROSOL, METERED ORAL at 15:01

## 2020-08-01 RX ADMIN — Medication 12.5 MILLIGRAM(S): at 18:01

## 2020-08-01 RX ADMIN — ALBUTEROL 2.5 MILLIGRAM(S): 90 AEROSOL, METERED ORAL at 12:07

## 2020-08-01 RX ADMIN — HEPARIN SODIUM 5000 UNIT(S): 5000 INJECTION INTRAVENOUS; SUBCUTANEOUS at 21:03

## 2020-08-01 RX ADMIN — Medication 40 MILLIGRAM(S): at 18:01

## 2020-08-01 RX ADMIN — Medication 4: at 18:01

## 2020-08-01 RX ADMIN — Medication 3 MILLILITER(S): at 19:58

## 2020-08-01 RX ADMIN — ALBUTEROL 2.5 MILLIGRAM(S): 90 AEROSOL, METERED ORAL at 12:08

## 2020-08-01 RX ADMIN — ALBUTEROL 2.5 MILLIGRAM(S): 90 AEROSOL, METERED ORAL at 12:05

## 2020-08-01 RX ADMIN — Medication 20 MILLIGRAM(S): at 14:16

## 2020-08-01 RX ADMIN — Medication 8: at 21:12

## 2020-08-01 NOTE — H&P ADULT - PROBLEM SELECTOR PLAN 2
Chronic, home-Breo, Ventolin, Prednisone 1 mg, Montelukast 10 mg  In ED-AB.//24  s/p IV Zithromax 500 x1, IV Solumedrol 125 x1, Duoneb  - azithromycin for COPD exacerbation and anti inflammatory properties  -On BIPAP  -Start solumedrol 40mg q8 hours  -Duoneb q6, albuterol PRN, symbicort when off BiPAP  -Continue home Montelukast  -repeat ABG in AM  -High risk for sudden decompensation and subsequent intubation, he has been intubated for COPD in the past See above.   -mgmt per ICU

## 2020-08-01 NOTE — DISCHARGE NOTE PROVIDER - HOSPITAL COURSE
ADMISSION H+P:        HPI:    79 yo M with PMH of COPD (On home O2 2L and BIPAP at while sleeping regardless of time of day), asthma, CAD (w/ 3v CABG ), s/p 2 recent coronary stents at [place], PVD s/p LLE stent (2019), HLD, DM2 (on Metformin), BPH, hx Hypercapnic Respiratory Failure/Cardiac Arrest requiring intubation (), with multiple frequent admissions, last in 3/2020 for Pneumonia here for COPD exacerbation. Wife is at bedside, pt is on BIPAP in the ED. Wife reports that patient has been more short of breath for the last day especially on exertion. Worsens with the humid weather. Patient is normally on 2 L oxygen at home and saturation is normally 90-95% off of BIPAP. Patient was very short of breath this morning, was not exerting himself. Wife noticed that patient's saturation was 88% and she increased his oxygen to 6 L without much help. Pt denies any fever, chills, body aches, cough, sputum production, lightheadedness, orthopnea. Patient has a history of HTN, blood pressure is usually 110's/70-80s but this morning SBP was 157 as per wife. Pt denies any CP, palpitations, dizziness, visual changes, N/V/D, abdominal pain, numbness/tingling.         In the ED:    Vitals: BP: 144/78 -->186/85, HR: 102, Temp: 96.6 F, RR: 24, SpO2: 100% (on BIPAP)    BIPAP settings: 18/5, FiO2: 35%, Rate: 18    Labs: WBC:13, Neut: 10.68, Neut%: 82.1, Mono%: 10, CO2: 33, A, glucose: 195, alk phosph: 124, proBNP: 193    AB.24/67/223/24     EKG: sinus tachycardia, rate: 115, premature ventricular complexes     CXR: negative    CT Chest: Resolution of previously seen LLL consolidation with minimal residual atelecasis. RLL area of linear atelectasis new since prior study, emphysema    s/p IV Zithromax 500 x1, IV Solumedrol 125 x1, Duoneb (01 Aug 2020 14:02)            ---    HOSPITAL COURSE: Patient admitted to ICU for acute on chronic hypercapnic respiratory failure likely 2/2 COPD exacerbation. Patient was placed on BiPAP and treated with steroids, bronchodilators, and azithromycin.         Patient was medically optimized and improved clinically throughout hospital course. Patient seen and examined on day of discharge.        Vital Signs    --        Physical Exam:    General: well-developed, well-nourished, NAD    HEENT: normocephalic, atraumatic, EOMI, moist mucous membranes     Neck: supple, non-tender, no masses    Neurology: AAOx3, sensation intact    Respiratory: clear to auscultation bilaterally; no wheezes, rhonchi, or rales    CV: regular rate and rhythm, soft S1/S2, no murmurs, rubs, or gallops    Abdominal: soft, non-tender, non-distended, bowel sounds present    Extremities: no clubbing, cyanosis, or edema; palpable peripheral pulses    Musculoskeletal: no joint erythema or warmth, no joint swelling     Skin: warm, dry, normal color        Patient is medically stable for discharge to ____ with outpatient follow up.    ---    CONSULTANTS:         ---    FINAL DISCHARGE DIAGNOSIS LIST:    Please see last daily progress note for final discharge diagnoses ADMISSION H+P:        HPI:    81 yo M with PMH of COPD (On home O2 2L and BIPAP at while sleeping regardless of time of day), asthma, CAD (w/ 3v CABG ), s/p 2 recent coronary stents at [place], PVD s/p LLE stent (2019), HLD, DM2 (on Metformin), BPH, hx Hypercapnic Respiratory Failure/Cardiac Arrest requiring intubation (), with multiple frequent admissions, last in 3/2020 for Pneumonia here for COPD exacerbation. Wife is at bedside, pt is on BIPAP in the ED. Wife reports that patient has been more short of breath for the last day especially on exertion. Worsens with the humid weather. Patient is normally on 2 L oxygen at home and saturation is normally 90-95% off of BIPAP. Patient was very short of breath this morning, was not exerting himself. Wife noticed that patient's saturation was 88% and she increased his oxygen to 6 L without much help. Pt denies any fever, chills, body aches, cough, sputum production, lightheadedness, orthopnea. Patient has a history of HTN, blood pressure is usually 110's/70-80s but this morning SBP was 157 as per wife. Pt denies any CP, palpitations, dizziness, visual changes, N/V/D, abdominal pain, numbness/tingling.         In the ED:    Vitals: BP: 144/78 -->186/85, HR: 102, Temp: 96.6 F, RR: 24, SpO2: 100% (on BIPAP)    BIPAP settings: 18/5, FiO2: 35%, Rate: 18    Labs: WBC:13, Neut: 10.68, Neut%: 82.1, Mono%: 10, CO2: 33, A, glucose: 195, alk phosph: 124, proBNP: 193    AB.24/67/223/24     EKG: sinus tachycardia, rate: 115, premature ventricular complexes     CXR: negative    CT Chest: Resolution of previously seen LLL consolidation with minimal residual atelecasis. RLL area of linear atelectasis new since prior study, emphysema    s/p IV Zithromax 500 x1, IV Solumedrol 125 x1, Duoneb (01 Aug 2020 14:02)            ---    HOSPITAL COURSE: Patient admitted to ICU for acute on chronic hypercapnic respiratory failure likely 2/2 COPD exacerbation. Patient was placed on BiPAP and treated with IV steroids, bronchodilators, and azithromycin. Patient transitioned from IV Solu-medrol to Prednisone 20 mg PO qd. Patient was medically optimized and improved clinically throughout hospital course. Patient seen and examined on day of discharge.        Vital Signs    T(C): 37.1 (02 Aug 2020 03:00), Max: 37.2 (01 Aug 2020 20:51)    T(F): 98.7 (02 Aug 2020 03:00), Max: 98.9 (01 Aug 2020 20:51)    HR: 66 (02 Aug 2020 07:00) (66 - 116)    BP: 124/58 (02 Aug 2020 07:00) (124/58 - 186/85)    BP(mean): 84 (02 Aug 2020 07:00) (84 - 112)    RR: 17 (02 Aug 2020 07:00) (16 - 37)    SpO2: 100% (02 Aug 2020 07:00) (95% - 100%)        Physical Exam:    General: well-developed, well-nourished, NAD    HEENT: normocephalic, atraumatic, EOMI, moist mucous membranes     Neck: supple, non-tender, no masses    Neurology: AAOx3, sensation intact    Respiratory: clear to auscultation bilaterally; no wheezes, rhonchi, or rales    CV: regular rate and rhythm, soft S1/S2, no murmurs, rubs, or gallops    Abdominal: soft, non-tender, non-distended, bowel sounds present    Extremities: no clubbing, cyanosis, or edema; palpable peripheral pulses    Musculoskeletal: no joint erythema or warmth, no joint swelling     Skin: warm, dry, normal color        Patient is medically stable for discharge to home with outpatient follow up.    ---    CONSULTANTS:         ---    FINAL DISCHARGE DIAGNOSIS LIST:    Please see last daily progress note for final discharge diagnoses

## 2020-08-01 NOTE — ED ADULT NURSE NOTE - OBJECTIVE STATEMENT
patient came in ED from home BIBA with BIPAP in progress, difficulty of breathing since yesterday. alert and responsive. labored respiration noted. afebrile. Dr charles called to bedside. BiPAP started at 15/5 40% O2. neb treatment started.

## 2020-08-01 NOTE — CHART NOTE - NSCHARTNOTEFT_GEN_A_CORE
Patient seen and examined, see ICU consult note for full detail     79 y/o male with hx HTN, CAD, PCI, CABG, PVD with b/l LE revascularization, DM2 (not on insulin), COPD/asthma on O2 and qhs BiPAP, cardiac arrest (resp mediated) in Oct 2018 (ROSC after 17 mins, s/p hypothermia protocol), now presents with shortness of breath x 1 day. Denies fever, cough, chest pain, n, v, d, body aches, recent travel, sick contacts. Compliant to meds. Not a current smoker.     VSS except RR 20-30    AAOx3  Moderate resp distress, poor b/l air entry on auscultation with minimal end-exp wheeze   S1S2 regular   Abd soft, NT, +BS   +LE edema     Labs and imaging reviewed     Imp:   Acute on chronic hypercapnic respiratory failure   COPD exacerbation - likely trigger environmental factors    Recs:   Admit to ICU   Continue BiPAP, settings changed to 18/8 (home settings)   Steroids, BDs, azithromycin   Lasix 20 mg x 1  Continue other home meds   DVT ppx with sc heparin     Patient critically ill, 40 mins spent     Wife updated at bedside

## 2020-08-01 NOTE — H&P ADULT - PROBLEM SELECTOR PLAN 5
Lower leg edema present on admission  h/o s/pLLE stent 11/2019  -Lasix 20mg IVP x1 as he has increase LE edema and was recently taken off his HCTZ medication  -Check Echo, most recent one in the chart is from 1/2020, proBNP: 193

## 2020-08-01 NOTE — DISCHARGE NOTE PROVIDER - NSDCMRMEDTOKEN_GEN_ALL_CORE_FT
aspirin 81 mg oral delayed release tablet: 1 tab(s) orally once a day  atorvastatin 40 mg oral tablet: 1 tab(s) orally once a day  Breo Ellipta 100 mcg-25 mcg/inh inhalation powder: 1 puff(s) inhaled once a day  clopidogrel 75 mg oral tablet: 1 tab(s) orally once a day  glipiZIDE 2.5 mg oral tablet, extended release: 1 tab(s) orally 2 times a day  Incruse Ellipta 62.5 mcg/inh inhalation powder: 1 puff(s) inhaled once a day  metFORMIN 1000 mg oral tablet: 1 tab(s) orally 2 times a day  Metoprolol Tartrate: 12.5 milligram(s) orally 2 times a day  montelukast 10 mg oral tablet: 1 tab(s) orally once a day (at bedtime)  predniSONE 1 mg oral tablet: 1 tab(s) orally once a day  tamsulosin 0.4 mg oral capsule: 1 cap(s) orally once a day (at bedtime)  Ventolin HFA 90 mcg/inh inhalation aerosol: 2 puff(s) inhaled 4 times a day, As Needed aspirin 81 mg oral delayed release tablet: 1 tab(s) orally once a day  atorvastatin 40 mg oral tablet: 1 tab(s) orally once a day  Breo Ellipta 100 mcg-25 mcg/inh inhalation powder: 1 puff(s) inhaled once a day  clopidogrel 75 mg oral tablet: 1 tab(s) orally once a day  glipiZIDE 2.5 mg oral tablet, extended release: 1 tab(s) orally 2 times a day  metFORMIN 1000 mg oral tablet: 1 tab(s) orally 2 times a day  Metoprolol Tartrate: 12.5 milligram(s) orally 2 times a day  Pepcid 20 mg oral tablet: 1 tab(s) orally 2 times a day   predniSONE 10 mg oral tablet: 2 tabs for 3 days  1 tab for 3 days  continue on home regimen  tamsulosin 0.4 mg oral capsule: 1 cap(s) orally once a day (at bedtime)  Ventolin HFA 90 mcg/inh inhalation aerosol: 2 puff(s) inhaled 4 times a day, As Needed

## 2020-08-01 NOTE — DISCHARGE NOTE PROVIDER - NSDCCPCAREPLAN_GEN_ALL_CORE_FT
PRINCIPAL DISCHARGE DIAGNOSIS  Diagnosis: COPD exacerbation  Assessment and Plan of Treatment: - You were admitted to ICU for respiratory failure and placed on BIPAP to help with work of breathing  - You were treated with steroids, bronchodilators, and IV anitbiotics      SECONDARY DISCHARGE DIAGNOSES  Diagnosis: Dyslipidemia  Assessment and Plan of Treatment: - Continue home medication Atorvastatin    Diagnosis: CAD (Coronary Artery Disease)  Assessment and Plan of Treatment: - Continue home medication Aspirin, Clopidegrel    Diagnosis: Hypertension  Assessment and Plan of Treatment: - Continue home medication Metoprolol Tartrate    Diagnosis: Diabetes Mellitus, Type II  Assessment and Plan of Treatment: - Continue home medication Metformin, Glipizide

## 2020-08-01 NOTE — DISCHARGE NOTE PROVIDER - NSDCFUSCHEDAPPT_GEN_ALL_CORE_FT
Y[facility]OTTO ; 08/10/2020 ; NPP [facility] [address] ; 08/10/2020 ; NPP [address] [address] ; 08/10/2020 ; P [address] [address] [address] ; 08/10/2020 ; P Surg Vasc [address]

## 2020-08-01 NOTE — H&P ADULT - ASSESSMENT
79 yo M with PMH of COPD (On home O2 2L and BIPAP at while sleeping regardless of time of day), asthma, CAD (w/ 3v CABG ), s/p 2 recent coronary stents at [place], PVD s/p LLE stent (2019), HLD, DM2 (on Metformin), BPH, hx Hypercapnic Respiratory Failure/Cardiac Arrest requiring intubation (), with multiple frequent admissions, last in 3/2020 for Pneumonia here for COPD exacerbation, admitted to ICU for acute hypercapnic respiratory failure, COPD exacerbation       Vitals: BP: 144/78 -->186/85, HR: 102, Temp: 96.6 F, RR: 24, SpO2: 100% (on BIPAP)  BIPAP settings: 18/5, FiO2: 35%, Rate: 18  Labs: WBC:13, Neut: 10.68, Neut%: 82.1, Mono%: 10, CO2: 33, A, glucose: 195, alk phosph-124  AB.24/67/223/24   EKG: sinus tachycardia, rate: 115, premature ventricular complexes   CXR: negative  CT Chest: Resolution of previously seen LLL consolidation with minimal residual atelecasis. RLL area of linear atelectasis new since prior study, emphysema  s/p IV Zithromax 500 x1, IV Solumedrol 125 x1, Duoneb 81 yo M with PMH of COPD (On home O2 2L and BIPAP at while sleeping regardless of time of day), asthma, CAD (w/ 3v CABG 2004), s/p 2 recent coronary stents at [place], PVD s/p LLE stent (11/2019), HLD, DM2 (on Metformin), BPH, hx Hypercapnic Respiratory Failure/Cardiac Arrest requiring intubation (6/18), with multiple frequent admissions, last in 3/2020 for Pneumonia here for COPD exacerbation, admitted to ICU for acute hypercapnic respiratory failure, COPD exacerbation

## 2020-08-01 NOTE — ED PROVIDER NOTE - OBJECTIVE STATEMENT
81 yo black male with H/O COPD, CABG, HTN and HLD on home oxygen PRN who over the past few days has been noting gradual but increase in oxygen requirements. In addition patient has noted increasing dyspnea especially with any exertion. No cough, sputum production, chest or abdominal pains, nausea, vomiting, diarrhea, fever or chills. Recent taper down to 1mg of Prednisone per day. In addition HCTZ was discontinued earlier this year.

## 2020-08-01 NOTE — H&P ADULT - NSHPPHYSICALEXAM_GEN_ALL_CORE
T(C): 35.9 (08-01-20 @ 12:06), Max: 35.9 (08-01-20 @ 12:06)  HR: 112 (08-01-20 @ 14:27) (102 - 116)  BP: 172/80 (08-01-20 @ 14:27) (144/78 - 186/85)  RR: 21 (08-01-20 @ 14:27) (21 - 26)  SpO2: 97% (08-01-20 @ 14:27) (95% - 100%)    GENERAL: patient appears well-on BIPAP, no acute distress  EYES: sclera clear, no exudates  ENMT: oropharynx clear without erythema, no exudates, moist mucous membranes  NECK: supple, soft  LUNGS: diminished breath sounds BL, no wheezing or rhonchi appreciated, no accessory muscle use  HEART: soft S1/S2, tachycardic, no murmurs noted, +BL lower extremity edema  GASTROINTESTINAL: abdomen is soft, nontender, nondistended, normoactive bowel sounds  INTEGUMENT: warm skin, appears well perfused  MUSCULOSKELETAL: no clubbing or cyanosis, +BL lower extremity edema  NEUROLOGIC: awake, alert, oriented x3, good muscle tone in all 4 extremities  HEME/LYMPH: no obvious ecchymosis or petechiae T(C): 35.9 (08-01-20 @ 12:06), Max: 35.9 (08-01-20 @ 12:06)  HR: 112 (08-01-20 @ 14:27) (102 - 116)  BP: 172/80 (08-01-20 @ 14:27) (144/78 - 186/85)  RR: 21 (08-01-20 @ 14:27) (21 - 26)  SpO2: 97% (08-01-20 @ 14:27) (95% - 100%)    GENERAL: patient appears well-on BIPAP, no acute distress  EYES: sclera clear, no exudates  ENMT: oropharynx clear without erythema, no exudates, moist mucous membranes  NECK: supple, soft  LUNGS: diminished breath sounds BL, no wheezing or rhonchi appreciated, no accessory muscle use  HEART: soft S1/S2, tachycardic, no murmurs noted, +BL lower extremity edema  GASTROINTESTINAL: abdomen is soft, nontender, nondistended, normoactive bowel sounds  INTEGUMENT: warm skin, appears well perfused  MUSCULOSKELETAL: no clubbing or cyanosis, +BL  trace to 1+ lower extremity edema  NEUROLOGIC: awake, alert, oriented x3, good muscle tone in all 4 extremities, CN-12 grossly intact, 5/5 Muscle strength in all ext  HEME/LYMPH: no obvious ecchymosis or petechiae

## 2020-08-01 NOTE — H&P ADULT - PROBLEM SELECTOR PLAN 3
Chronic  In the ED- BP: 144/78 -->186/85  -Hold home losartan for now and restart when Cr improve  -Hydralazine prn  -monitor routine hemodynamics

## 2020-08-01 NOTE — H&P ADULT - PROBLEM SELECTOR PLAN 1
In ED-AB.//24  s/p IV Zithromax 500 x1, IV Solumedrol 125 x1, Duoneb  -Keep on BiPAP -increase support to 18/8 to help with work of breathing and lower FIO2 to 30% to keep SPO2 between 88-92%  -Start solumedrol 40mg q8 hours  -Duoneb q6, albuterol PRN, symbicort when off BiPAP  -keep on BiPAP throughout the night   -Continue home Montelukast  -repeat ABG in AM In ED-AB/  Recommendations as per ICU:  s/p IV Zithromax 500 x1, IV Solumedrol 125 x1, Duoneb  -Keep on BiPAP -increase support to 18/8 to help with work of breathing and lower FIO2 to 30% to keep SPO2 between 88-92%  -Start solumedrol 40mg q8 hours  -Duoneb q6, albuterol PRN, symbicort when off BiPAP  -keep on BiPAP throughout the night   -Continue home Montelukast  -repeat ABG in AM

## 2020-08-01 NOTE — H&P ADULT - NSHPSOCIALHISTORY_GEN_ALL_CORE
Tobacco: Denies, former smoker-quit 30 years ago  EtOH: Denies  Recreational drug use: Denies  Lives with: Wife  Ambulates: Without assistance  Vaccinations: Flu 2019, Pneumonia 2019

## 2020-08-01 NOTE — CONSULT NOTE ADULT - SUBJECTIVE AND OBJECTIVE BOX
Patient is a 80y old  Male who presents with a chief complaint of SOB    HPI:  80 y/o with pmhx COPD (On home O2 2L and BIPAP at while sleeping regardless of time of day), asthma, CAD (w/ 3v CABG ), s/p 2 recent coronary stents at [place], PVD s/p LLE stent (2019), HLD, DM2 (on Metformin), BPH, hx Hypercapnic Respiratory Failure, with multiple frequent admissions, last in 3/2020 for COPD exacerbation presents to [facility] with Shortness of breath x1 day. Wife reports patient has been feeling more short of breath x1 day, he normally gets worse with the heat and humidity. She also states he did some exercising yesterday which may have made things worse. She states he normally uses 2-3L of O2 at home but in the last day she needed to increase it to 7L. Patient denies fever, chills, nausea, vomiting, abdominal pain, CP, headache, dysuria, body aches, or cough. In the ER he was found to have PCO2 of 67 with pH of 7.24 he was given steroids, nebs, and placed on BiPAP, ICU was called for consult.     Patient's sons are visiting from [place] and they have been here since early , they have been in self quarantine since they arrived, and wife states they have all tested negative for COVID, they do not have symptoms. They are up visiting preparing for the patient's son .       PAST MEDICAL & SURGICAL HISTORY:  PVD (peripheral vascular disease)  Hypertension  COPD (chronic obstructive pulmonary disease): on 2L at home and BiPAP at night; intubated   Osteomyelitis  Dyslipidemia  CAD (Coronary Artery Disease): s/p 3v CABG ; stents placed in [place] in   Diabetes Mellitus, Type II  S/P primary angioplasty with coronary stent  Compound fracture: left leg  CABG (Coronary Artery Bypass Graft):       Review of Systems:  CONSTITUTIONAL: No fever, chills, or fatigue  EYES: No eye pain, visual disturbances, or discharge  ENMT:  No difficulty hearing, tinnitus, vertigo; No sinus or throat pain  NECK: No pain or stiffness  RESPIRATORY: No cough, wheezing, chills or hemoptysis; + shortness of breath  CARDIOVASCULAR: No chest pain, palpitations, dizziness, or leg swelling  GASTROINTESTINAL: No abdominal or epigastric pain. No nausea, vomiting, or hematemesis; No diarrhea or constipation. No melena or hematochezia.  GENITOURINARY: No dysuria, frequency, hematuria, or incontinence  NEUROLOGICAL: No headaches, memory loss, loss of strength, numbness, or tremors  SKIN: No itching, burning, rashes, or lesions   MUSCULOSKELETAL: No joint pain or swelling; No muscle, back, or extremity pain  PSYCHIATRIC: No depression, anxiety, mood swings, or difficulty sleeping      Medications:  azithromycin  IVPB      metoprolol tartrate 12.5 milliGRAM(s) Oral two times a day  tamsulosin 0.4 milliGRAM(s) Oral at bedtime  ALBUTerol    0.083%. 2.5 milliGRAM(s) Nebulizer once  ALBUTerol   0.5% 2.5 milliGRAM(s) Nebulizer every 2 hours PRN  albuterol/ipratropium for Nebulization 3 milliLiter(s) Nebulizer every 6 hours  budesonide 160 MICROgram(s)/formoterol 4.5 MICROgram(s) Inhaler 2 Puff(s) Inhalation two times a day  loratadine 10 milliGRAM(s) Oral daily  heparin   Injectable 5000 Unit(s) SubCutaneous every 8 hours  pantoprazole  Injectable 40 milliGRAM(s) IV Push daily      atorvastatin 40 milliGRAM(s) Oral at bedtime  dextrose 40% Gel 15 Gram(s) Oral once PRN  dextrose 50% Injectable 12.5 Gram(s) IV Push once  glucagon  Injectable 1 milliGRAM(s) IntraMuscular once PRN  insulin lispro (HumaLOG) corrective regimen sliding scale   SubCutaneous every 6 hours  methylPREDNISolone sodium succinate Injectable 40 milliGRAM(s) IV Push every 8 hours  dextrose 5%. 1000 milliLiter(s) IV Continuous <Continuous>  chlorhexidine 2% Cloths 1 Application(s) Topical <User Schedule>      ICU Vital Signs Last 24 Hrs  T(C): 35.9 (01 Aug 2020 12:06), Max: 35.9 (01 Aug 2020 12:06)  T(F): 96.6 (01 Aug 2020 12:06), Max: 96.6 (01 Aug 2020 12:06)  HR: 116 (01 Aug 2020 13:43) (102 - 116)  BP: 186/85 (01 Aug 2020 13:43) (144/78 - 186/85)  RR: 26 (01 Aug 2020 13:43) (24 - 26)  SpO2: 95% (01 Aug 2020 13:43) (95% - 100%)      ABG - ( 01 Aug 2020 12:33 )  pH, Arterial: 7.24  pH, Blood: x     /  pCO2: 67    /  pO2: 223   / HCO3: 24    / Base Excess: 0.8   /  SaO2: 99          I&O's Detail        LABS:                        13.4   13.00 )-----------( 283      ( 01 Aug 2020 11:56 )             44.6     08-    142  |  105  |  17  ----------------------------<  195<H>  5.0   |  33<H>  |  1.30    Ca    10.0      01 Aug 2020 13:17    TPro  7.9  /  Alb  4.0  /  TBili  0.4  /  DBili  x   /  AST  20  /  ALT  20  /  AlkPhos  124<H>  08-01      CARDIAC MARKERS ( 01 Aug 2020 13:17 )  <.015 ng/mL / x     / x     / x     / x          CAPILLARY BLOOD GLUCOSE      CULTURES:      Physical Examination:    General: On BiPAP in resp distress, belly breathing and using accessory muscles, patient is awake and alert    HEENT: Pupils equal, reactive to light.  Symmetric.    PULM: poor air movement, expiratory wheeze, accessory muscle use    CVS: tachycardic rate and regular rhythm, no murmurs, rubs, or gallops    ABD: Soft, nondistended, nontender, normoactive bowel sounds, no masses    EXT: +2 pitting edema b/l LE     SKIN: Warm and well perfused, no rashes noted.    NEURO: A&Ox3, strength 5/5 all extremities, cranial nerves grossly intact, no focal deficits      RADIOLOGY: CXR appears clear no focal infiltrate or consolidation       CRITICAL CARE TIME SPENT: 70minutes assessing presenting problems of acute illness, which pose high probability of life threatening deterioration or end organ damage/dysfunction, as well as medical decision making including initiating plan of care, reviewing data, reviewing radiologic exams, discussing with multidisciplinary team,  discussing goals of care with patient/family, and writing this note.  Non-inclusive of procedures performed,

## 2020-08-01 NOTE — H&P ADULT - HISTORY OF PRESENT ILLNESS
81 yo M with PMH of COPD (On home O2 2L and BIPAP at while sleeping regardless of time of day), asthma, CAD (w/ 3v CABG ), s/p 2 recent coronary stents at [place], PVD s/p LLE stent (2019), HLD, DM2 (on Metformin), BPH, hx Hypercapnic Respiratory Failure/Cardiac Arrest requiring intubation (), with multiple frequent admissions, last in 3/2020 for Pneumonia. Wife is at bedside, pt is on BIPAP in the ED. Wife reports that patient has been more short of breath for the last few days especially on exertion. Patient is normally on 2 L oxygen at home and saturation is normally 90-95% off of BIPAP. Patient was very short of breath this morning, was not exerting himself. Wife noticed that patient's saturation was 88% and she increased his oxygen to 4 L without much help. Pt denies any fever, chills, body aches, cough, sputum production, lightheadedness, orthopnea. Patient has a history of HTN, blood pressure is usually 110's/70-80s but this morning SBP was 157 as per wife. Pt denies any CP, palpitations, dizziness, visual changes, N/V/D, abdominal pain, numbness/tingling.     In the ED:  Vitals: BP: 144/78 -->186/85, HR: 102, Temp: 96.6 F, RR: 24, SpO2: 100% (on BIPAP)  BIPAP settings: 18/5, FiO2: 35%, Rate: 18  Labs: WBC:13, Neut: 10.68, Neut%: 82.1, Mono%: 10  AB.24/67/223/24   EKG:  CT Chest: Resolution of previously seen LLL consolidation with minimal residual atelecasis. RLL area of linear atelectasis new since prior study, emphysema 79 yo M with PMH of COPD (On home O2 2L and BIPAP at while sleeping regardless of time of day), asthma, CAD (w/ 3v CABG ), s/p 2 recent coronary stents at [place], PVD s/p LLE stent (2019), HLD, DM2 (on Metformin), BPH, hx Hypercapnic Respiratory Failure/Cardiac Arrest requiring intubation (), with multiple frequent admissions, last in 3/2020 for Pneumonia c/o worsening SOB. Wife is at bedside, pt is on BIPAP in the ED. Wife reports that patient has been more short of breath for the last few days especially on exertion. Patient is normally on 2 L oxygen at home and saturation is normally 90-95% off of BIPAP. Patient was very short of breath this morning, was not exerting himself. Wife noticed that patient's saturation was 88% and she increased his oxygen to 4 L without much help. Pt denies any fever, chills, body aches, cough, sputum production, lightheadedness, orthopnea. Patient has a history of HTN, blood pressure is usually 110's/70-80s but this morning SBP was 157 as per wife. Pt denies any CP, palpitations, dizziness, visual changes, N/V/D, abdominal pain, numbness/tingling.     In the ED:  Vitals: BP: 144/78 -->186/85, HR: 102, Temp: 96.6 F, RR: 24, SpO2: 100% (on BIPAP)  BIPAP settings: 18/5, FiO2: 35%, Rate: 18  Labs: WBC:13, Neut: 10.68, Neut%: 82.1, Mono%: 10, CO2: 33, A, glucose: 195, alk phosph-124  AB.24/67/223/24   EKG: sinus tachycardia, rate: 115, premature ventricular complexes   CXR: negative  CT Chest: Resolution of previously seen LLL consolidation with minimal residual atelecasis. RLL area of linear atelectasis new since prior study, emphysema 81 yo M with PMH of COPD (On home O2 2L and BIPAP at while sleeping regardless of time of day), asthma, CAD (w/ 3v CABG ), s/p 2 recent coronary stents at [place], PVD s/p LLE stent (2019), HLD, DM2 (on Metformin), BPH, hx Hypercapnic Respiratory Failure/Cardiac Arrest requiring intubation (), with multiple frequent admissions, last in 3/2020 for Pneumonia here for COPD exacerbation. Wife is at bedside, pt is on BIPAP in the ED. Wife reports that patient has been more short of breath for the last day especially on exertion. Worsens with the humid weather. Patient is normally on 2 L oxygen at home and saturation is normally 90-95% off of BIPAP. Patient was very short of breath this morning, was not exerting himself. Wife noticed that patient's saturation was 88% and she increased his oxygen to 6 L without much help. Pt denies any fever, chills, body aches, cough, sputum production, lightheadedness, orthopnea. Patient has a history of HTN, blood pressure is usually 110's/70-80s but this morning SBP was 157 as per wife. Pt denies any CP, palpitations, dizziness, visual changes, N/V/D, abdominal pain, numbness/tingling.     In the ED:  Vitals: BP: 144/78 -->186/85, HR: 102, Temp: 96.6 F, RR: 24, SpO2: 100% (on BIPAP)  BIPAP settings: 18/5, FiO2: 35%, Rate: 18  Labs: WBC:13, Neut: 10.68, Neut%: 82.1, Mono%: 10, CO2: 33, A, glucose: 195, alk phosph: 124, proBNP: 193  AB.24/67/223/24   EKG: sinus tachycardia, rate: 115, premature ventricular complexes   CXR: negative  CT Chest: Resolution of previously seen LLL consolidation with minimal residual atelecasis. RLL area of linear atelectasis new since prior study, emphysema  s/p IV Zithromax 500 x1, IV Solumedrol 125 x1, Duoneb

## 2020-08-01 NOTE — H&P ADULT - PROBLEM SELECTOR PLAN 7
Chronic  ED-glucose: 195  -home on Metformin 1000 BID  - Insulin sliding scale for diabetes management, if consistently above 200 glucose add lantus

## 2020-08-01 NOTE — ED PROVIDER NOTE - CRITICAL CARE PROVIDED
consult w/ pt's family directly relating to pts condition/additional history taking/direct patient care (not related to procedure)/interpretation of diagnostic studies/documentation

## 2020-08-01 NOTE — ED PROVIDER NOTE - CONSTITUTIONAL, MLM
normal... Weak appearing obese black male, awake, alert, oriented to person, place, time/situation and in moderate apparent distress.

## 2020-08-01 NOTE — H&P ADULT - NSHPREVIEWOFSYSTEMS_GEN_ALL_CORE
CONSTITUTIONAL: denies fever, chills, fatigue, weakness  HEENT: denies blurred vision, sore throat  CARDIOVASCULAR: denies chest pain, chest pressure, palpitations  RESPIRATORY: + shortness of breath, +dyspnea on exertion, denies cough, sputum production  GASTROINTESTINAL: denies nausea, vomiting, diarrhea, abdominal pain  GENITOURINARY: denies dysuria, discharge  NEUROLOGICAL: denies numbness, headache, focal weakness  MUSCULOSKELETAL: denies new joint pain, muscle aches CONSTITUTIONAL: denies fever, chills, fatigue, weakness  HEENT: denies blurred vision, sore throat  CARDIOVASCULAR: denies chest pain, chest pressure, palpitations  RESPIRATORY: + shortness of breath, +dyspnea on exertion, denies cough, sputum production  GASTROINTESTINAL: denies nausea, vomiting, diarrhea, abdominal pain, melena, hemtachezia  GENITOURINARY: denies dysuria, discharge  NEUROLOGICAL: denies numbness, headache, focal weakness  MUSCULOSKELETAL: denies new joint pain, muscle aches

## 2020-08-01 NOTE — H&P ADULT - ATTENDING COMMENTS
Agree with the above. Note edited where appropriate. Pt seen and examined at bedside. Discussed with Dr. Hodges and Dr. Fifi Apodaca

## 2020-08-01 NOTE — H&P ADULT - PROBLEM SELECTOR PLAN 8
IMPROVE VTE Individual Risk Assessment          RISK                                                          Points  [  ] Previous VTE                                                3  [  ] Thrombophilia                                             2  [  ] Lower limb paralysis                                   2        (unable to hold up >15 seconds)    [  ] Current Cancer                                             2         (within 6 months)  [  ] Immobilization > 24 hrs                              1  [ x ] ICU/CCU stay > 24 hours                             1  [ x ] Age > 60                                                         1    IMPROVE VTE Score:         [    2     ]    Total Risk Factor Score:    0 - 1:   Consider IPC  >2 - 3:  Thromboprophylaxis required (enoxaparin or SQ heparin)        >4:   High Risk: Thromboprophylaxis required (enoxaparin or SQ heparin), optional add IPC  **If CONTRAINDICATION to enoxaparin or SQ heparin, USE IPCs** IMPROVE VTE Individual Risk Assessment: will place on heparin           RISK                                                          Points  [  ] Previous VTE                                                3  [  ] Thrombophilia                                             2  [  ] Lower limb paralysis                                   2        (unable to hold up >15 seconds)    [  ] Current Cancer                                             2         (within 6 months)  [  ] Immobilization > 24 hrs                              1  [ x ] ICU/CCU stay > 24 hours                             1  [ x ] Age > 60                                                         1    IMPROVE VTE Score:         [    2     ]    Total Risk Factor Score:    0 - 1:   Consider IPC  >2 - 3:  Thromboprophylaxis required (enoxaparin or SQ heparin)        >4:   High Risk: Thromboprophylaxis required (enoxaparin or SQ heparin), optional add IPC  **If CONTRAINDICATION to enoxaparin or SQ heparin, USE IPCs**

## 2020-08-01 NOTE — CONSULT NOTE ADULT - ASSESSMENT
78 y/o with pmhx COPD (On home O2 2L and BIPAP at while sleeping regardless of time of day), asthma, CAD (w/ 3v CABG 2004), s/p 2 recent coronary stents at [place], PVD s/p LLE stent (11/2019), HLD, DM2 (on Metformin), BPH, hx Hypercapnic Respiratory Failure, with multiple frequent admissions, last in 3/2020 for COPD exacerbation presents to [facility] with Shortness of breath x1 day.  Found to have acute hypercapnic resp failure requiring BiPAP and acute exacerbation of COPD.     PLan discussed with ICU attending Dr. Hodges     Problem List:  1) acute hypercapnic resp failure  2) Acute exacerbation of COPD  3) Hyperglycemia   4) HTN/HLD/CAD 80 y/o with pmhx COPD (On home O2 2L and BIPAP at while sleeping regardless of time of day), asthma, CAD (w/ 3v CABG 2004), s/p 2 recent coronary stents at [place], PVD s/p LLE stent (11/2019), HLD, DM2 (on Metformin), BPH, hx Hypercapnic Respiratory Failure, with multiple frequent admissions, last in 3/2020 for COPD exacerbation presents to [facility] with Shortness of breath x1 day.  Found to have acute hypercapnic resp failure requiring BiPAP and acute exacerbation of COPD.     PLan discussed with ICU attending Dr. Hodges     Problem List:  1) acute hypercapnic resp failure  2) Acute exacerbation of COPD  3) Hyperglycemia   4) HTN/HLD/CAD    -Admit to ICU for further management and observation given his work of breathing and bipap requirements  - Keep on BiPAP increase support to 18/8 to help with work of breathing, lower FIO2 to 30% to keep SPO2 between 88-92%  - Add azithromycin for AECOPD and anti inflammatory properties  - S/p Solumedrol 125mg IVP, start solumedrol 40mg q8 hours  - Duoneb q6, albuterol PRN, symbicort when off BiPAP  - Would keep on BiPAP throughout the night and repeat ABG in AM  - Give lasix 20mg IVP x1 as he has increase LE edema and was recently taken off his HCTZ medication  - Check Echo, most recent one in the chart is from 1/2020  - Insulin sliding scale for diabetes management, if consistently above 200 glucose add lantus  - Restart home dose metoprolol, tamsulosin montelukast, statin and plavix  - Hold losartan for now and restart when Cr improves  - Can use hydralazine PRN for hypertension  -High risk for sudden decompensation and subsequent intubation, he has been intubated for COPD in the past

## 2020-08-02 LAB
A1C WITH ESTIMATED AVERAGE GLUCOSE RESULT: 6.2 % — HIGH (ref 4–5.6)
ALBUMIN SERPL ELPH-MCNC: 3.5 G/DL — SIGNIFICANT CHANGE UP (ref 3.3–5)
ALP SERPL-CCNC: 112 U/L — SIGNIFICANT CHANGE UP (ref 40–120)
ALT FLD-CCNC: 19 U/L — SIGNIFICANT CHANGE UP (ref 12–78)
ANION GAP SERPL CALC-SCNC: 4 MMOL/L — LOW (ref 5–17)
AST SERPL-CCNC: 20 U/L — SIGNIFICANT CHANGE UP (ref 15–37)
BASE EXCESS BLDA CALC-SCNC: 4.9 MMOL/L — HIGH (ref -2–2)
BASOPHILS # BLD AUTO: 0 K/UL — SIGNIFICANT CHANGE UP (ref 0–0.2)
BASOPHILS NFR BLD AUTO: 0 % — SIGNIFICANT CHANGE UP (ref 0–2)
BILIRUB SERPL-MCNC: 0.3 MG/DL — SIGNIFICANT CHANGE UP (ref 0.2–1.2)
BLOOD GAS COMMENTS ARTERIAL: SIGNIFICANT CHANGE UP
BLOOD GAS COMMENTS ARTERIAL: SIGNIFICANT CHANGE UP
BUN SERPL-MCNC: 18 MG/DL — SIGNIFICANT CHANGE UP (ref 7–23)
CALCIUM SERPL-MCNC: 9.9 MG/DL — SIGNIFICANT CHANGE UP (ref 8.5–10.1)
CHLORIDE SERPL-SCNC: 103 MMOL/L — SIGNIFICANT CHANGE UP (ref 96–108)
CO2 SERPL-SCNC: 32 MMOL/L — HIGH (ref 22–31)
CREAT SERPL-MCNC: 1.3 MG/DL — SIGNIFICANT CHANGE UP (ref 0.5–1.3)
EOSINOPHIL # BLD AUTO: 0 K/UL — SIGNIFICANT CHANGE UP (ref 0–0.5)
EOSINOPHIL NFR BLD AUTO: 0 % — SIGNIFICANT CHANGE UP (ref 0–6)
ESTIMATED AVERAGE GLUCOSE: 131 MG/DL — HIGH (ref 68–114)
GLUCOSE SERPL-MCNC: 207 MG/DL — HIGH (ref 70–99)
HCO3 BLDA-SCNC: 28 MMOL/L — HIGH (ref 23–27)
HCT VFR BLD CALC: 40 % — SIGNIFICANT CHANGE UP (ref 39–50)
HGB BLD-MCNC: 12.2 G/DL — LOW (ref 13–17)
HOROWITZ INDEX BLDA+IHG-RTO: 28 — SIGNIFICANT CHANGE UP
IMM GRANULOCYTES NFR BLD AUTO: 0.7 % — SIGNIFICANT CHANGE UP (ref 0–1.5)
LYMPHOCYTES # BLD AUTO: 0.55 K/UL — LOW (ref 1–3.3)
LYMPHOCYTES # BLD AUTO: 7.2 % — LOW (ref 13–44)
MAGNESIUM SERPL-MCNC: 1.9 MG/DL — SIGNIFICANT CHANGE UP (ref 1.6–2.6)
MCHC RBC-ENTMCNC: 26.8 PG — LOW (ref 27–34)
MCHC RBC-ENTMCNC: 30.5 GM/DL — LOW (ref 32–36)
MCV RBC AUTO: 87.9 FL — SIGNIFICANT CHANGE UP (ref 80–100)
MONOCYTES # BLD AUTO: 0.48 K/UL — SIGNIFICANT CHANGE UP (ref 0–0.9)
MONOCYTES NFR BLD AUTO: 6.2 % — SIGNIFICANT CHANGE UP (ref 2–14)
NEUTROPHILS # BLD AUTO: 6.61 K/UL — SIGNIFICANT CHANGE UP (ref 1.8–7.4)
NEUTROPHILS NFR BLD AUTO: 85.9 % — HIGH (ref 43–77)
NRBC # BLD: 0 /100 WBCS — SIGNIFICANT CHANGE UP (ref 0–0)
PCO2 BLDA: 55 MMHG — HIGH (ref 32–46)
PH BLDA: 7.36 — SIGNIFICANT CHANGE UP (ref 7.35–7.45)
PHOSPHATE SERPL-MCNC: 2.1 MG/DL — LOW (ref 2.5–4.5)
PLATELET # BLD AUTO: 240 K/UL — SIGNIFICANT CHANGE UP (ref 150–400)
PO2 BLDA: 86 MMHG — SIGNIFICANT CHANGE UP (ref 74–108)
POTASSIUM SERPL-MCNC: 4.7 MMOL/L — SIGNIFICANT CHANGE UP (ref 3.5–5.3)
POTASSIUM SERPL-SCNC: 4.7 MMOL/L — SIGNIFICANT CHANGE UP (ref 3.5–5.3)
PROT SERPL-MCNC: 7.4 G/DL — SIGNIFICANT CHANGE UP (ref 6–8.3)
RBC # BLD: 4.55 M/UL — SIGNIFICANT CHANGE UP (ref 4.2–5.8)
RBC # FLD: 13.6 % — SIGNIFICANT CHANGE UP (ref 10.3–14.5)
SAO2 % BLDA: 96 % — SIGNIFICANT CHANGE UP (ref 92–96)
SARS-COV-2 IGG SERPL QL IA: NEGATIVE — SIGNIFICANT CHANGE UP
SARS-COV-2 IGG SERPL QL IA: NEGATIVE — SIGNIFICANT CHANGE UP
SARS-COV-2 IGM SERPL IA-ACNC: 0.08 INDEX — SIGNIFICANT CHANGE UP
SARS-COV-2 IGM SERPL IA-ACNC: 0.1 INDEX — SIGNIFICANT CHANGE UP
SODIUM SERPL-SCNC: 139 MMOL/L — SIGNIFICANT CHANGE UP (ref 135–145)
WBC # BLD: 7.69 K/UL — SIGNIFICANT CHANGE UP (ref 3.8–10.5)
WBC # FLD AUTO: 7.69 K/UL — SIGNIFICANT CHANGE UP (ref 3.8–10.5)

## 2020-08-02 PROCEDURE — 93306 TTE W/DOPPLER COMPLETE: CPT | Mod: 26

## 2020-08-02 PROCEDURE — 99233 SBSQ HOSP IP/OBS HIGH 50: CPT | Mod: GC

## 2020-08-02 PROCEDURE — 99233 SBSQ HOSP IP/OBS HIGH 50: CPT

## 2020-08-02 RX ORDER — POTASSIUM PHOSPHATE, MONOBASIC POTASSIUM PHOSPHATE, DIBASIC 236; 224 MG/ML; MG/ML
15 INJECTION, SOLUTION INTRAVENOUS ONCE
Refills: 0 | Status: DISCONTINUED | OUTPATIENT
Start: 2020-08-02 | End: 2020-08-02

## 2020-08-02 RX ORDER — SODIUM,POTASSIUM PHOSPHATES 278-250MG
1 POWDER IN PACKET (EA) ORAL ONCE
Refills: 0 | Status: COMPLETED | OUTPATIENT
Start: 2020-08-02 | End: 2020-08-02

## 2020-08-02 RX ORDER — CARBAMIDE PEROXIDE 81.86 MG/ML
5 SOLUTION/ DROPS AURICULAR (OTIC)
Refills: 0 | Status: DISCONTINUED | OUTPATIENT
Start: 2020-08-02 | End: 2020-08-03

## 2020-08-02 RX ADMIN — Medication 1 PACKET(S): at 08:02

## 2020-08-02 RX ADMIN — Medication 40 MILLIGRAM(S): at 02:27

## 2020-08-02 RX ADMIN — Medication 3 MILLILITER(S): at 14:35

## 2020-08-02 RX ADMIN — Medication 20 MILLIGRAM(S): at 17:24

## 2020-08-02 RX ADMIN — CARBAMIDE PEROXIDE 5 DROP(S): 81.86 SOLUTION/ DROPS AURICULAR (OTIC) at 17:36

## 2020-08-02 RX ADMIN — Medication 3 MILLILITER(S): at 20:31

## 2020-08-02 RX ADMIN — AZITHROMYCIN 255 MILLIGRAM(S): 500 TABLET, FILM COATED ORAL at 13:16

## 2020-08-02 RX ADMIN — CLOPIDOGREL BISULFATE 75 MILLIGRAM(S): 75 TABLET, FILM COATED ORAL at 12:01

## 2020-08-02 RX ADMIN — HEPARIN SODIUM 5000 UNIT(S): 5000 INJECTION INTRAVENOUS; SUBCUTANEOUS at 13:16

## 2020-08-02 RX ADMIN — Medication 12.5 MILLIGRAM(S): at 17:24

## 2020-08-02 RX ADMIN — HEPARIN SODIUM 5000 UNIT(S): 5000 INJECTION INTRAVENOUS; SUBCUTANEOUS at 21:55

## 2020-08-02 RX ADMIN — LORATADINE 10 MILLIGRAM(S): 10 TABLET ORAL at 13:16

## 2020-08-02 RX ADMIN — MONTELUKAST 10 MILLIGRAM(S): 4 TABLET, CHEWABLE ORAL at 12:01

## 2020-08-02 RX ADMIN — Medication 2: at 12:01

## 2020-08-02 RX ADMIN — CHLORHEXIDINE GLUCONATE 1 APPLICATION(S): 213 SOLUTION TOPICAL at 05:37

## 2020-08-02 RX ADMIN — Medication 12.5 MILLIGRAM(S): at 05:37

## 2020-08-02 RX ADMIN — Medication 3 MILLILITER(S): at 01:09

## 2020-08-02 RX ADMIN — TAMSULOSIN HYDROCHLORIDE 0.4 MILLIGRAM(S): 0.4 CAPSULE ORAL at 21:55

## 2020-08-02 RX ADMIN — Medication 85 MILLIMOLE(S): at 18:49

## 2020-08-02 RX ADMIN — HEPARIN SODIUM 5000 UNIT(S): 5000 INJECTION INTRAVENOUS; SUBCUTANEOUS at 05:37

## 2020-08-02 RX ADMIN — PANTOPRAZOLE SODIUM 40 MILLIGRAM(S): 20 TABLET, DELAYED RELEASE ORAL at 12:01

## 2020-08-02 RX ADMIN — ATORVASTATIN CALCIUM 40 MILLIGRAM(S): 80 TABLET, FILM COATED ORAL at 22:30

## 2020-08-02 RX ADMIN — Medication 4: at 08:02

## 2020-08-02 RX ADMIN — Medication 3 MILLILITER(S): at 08:03

## 2020-08-02 NOTE — PROGRESS NOTE ADULT - SUBJECTIVE AND OBJECTIVE BOX
Patient is a 80y old  Male who presents with a chief complaint of COPD Exacerbation (01 Aug 2020 19:37)    24 hour events: Patient seen and examined at bedside. No acute overnight events. Patient off BiPAP, now on NC.     REVIEW OF SYSTEMS  Constitutional: No fever, chills  Neuro: No headache, numbness, weakness  Resp: No cough, wheezing, shortness of breath  CVS: No chest pain, palpitations, leg swelling  GI: No abdominal pain, nausea, vomiting, diarrhea   : No dysuria, frequency, incontinence  Skin: No itching, burning, rashes, or lesions   Msk: No joint pain or swelling  Heme: No bleeding    T(F): 98.7 (08-02-20 @ 03:00), Max: 98.9 (08-01-20 @ 20:51)  HR: 83 (08-02-20 @ 06:00) (69 - 116)  BP: 132/78 (08-02-20 @ 06:00) (130/61 - 186/85)  RR: 24 (08-02-20 @ 06:00) (16 - 37)  SpO2: 100% (08-02-20 @ 06:00) (95% - 100%)  Wt(kg): --      CAPILLARY BLOOD GLUCOSE      POCT Blood Glucose.: 307 mg/dL (01 Aug 2020 21:09)  POCT Blood Glucose.: 250 mg/dL (01 Aug 2020 17:59)    I&O's Summary    08-01 @ 07:01  -  08-02 @ 07:00  --------------------------------------------------------  IN: 420 mL / OUT: 750 mL / NET: -330 mL      PHYSICAL EXAM  General: NAD, well developed, on NC  CNS: A&Ox3, strength 5/5 all extremities, cranial nerves grossly intact, no focal deficits  HEENT: Pupils equal, reactive to light. Symmetric.  Resp: CTA b/l  CVS: RRR, no murmurs, rubs, or gallops  Abd:  Soft, nondistended, nontender, normoactive bowel sounds, no masses  Ext: +2 pitting edema b/l LE   Skin: Warm and well perfused, no rashes noted    MEDICATIONS  azithromycin  IVPB IV Intermittent  azithromycin  IVPB     metoprolol tartrate Oral  tamsulosin Oral    atorvastatin Oral  dextrose 40% Gel Oral PRN  dextrose 50% Injectable IV Push  glucagon  Injectable IntraMuscular PRN  insulin lispro (HumaLOG) corrective regimen sliding scale SubCutaneous  methylPREDNISolone sodium succinate Injectable IV Push    ALBUTerol   0.5% Nebulizer PRN  albuterol/ipratropium for Nebulization Nebulizer  budesonide 160 MICROgram(s)/formoterol 4.5 MICROgram(s) Inhaler Inhalation  loratadine Oral  montelukast Oral        clopidogrel Tablet Oral  heparin   Injectable SubCutaneous    pantoprazole  Injectable IV Push      dextrose 5%. IV Continuous  potassium phosphate / sodium phosphate Powder (PHOS-NaK) Oral      carbamide peroxide Otic Solution Left Ear  chlorhexidine 2% Cloths Topical                            12.2   7.69  )-----------( 240      ( 02 Aug 2020 05:28 )             40.0       08-02    139  |  103  |  18  ----------------------------<  207<H>  4.7   |  32<H>  |  1.30    Ca    9.9      02 Aug 2020 05:28  Phos  2.1     08-02  Mg     1.9     08-02    TPro  7.4  /  Alb  3.5  /  TBili  0.3  /  DBili  x   /  AST  20  /  ALT  19  /  AlkPhos  112  08-02      CARDIAC MARKERS ( 01 Aug 2020 13:17 )  <.015 ng/mL / x     / x     / x     / x        Rapid RVP Result: Julissatec (08-01 @ 15:50)    CENTRAL LINE: N          DATE INSERTED:              REMOVE: Y/N  SHARMA: N                        DATE INSERTED:              REMOVE: Y/N  A-LINE: N                       DATE INSERTED:              REMOVE: Y/N    GLOBAL ISSUE/BEST PRACTICE  Analgesia: no  Sedation: no  HOB elevation: yes  Stress ulcer prophylaxis: yes  VTE prophylaxis: yes  Glycemic control: yes  Nutrition: yes    CODE STATUS: Full  [facility] discussion: Y

## 2020-08-02 NOTE — DIETITIAN INITIAL EVALUATION ADULT. - PROBLEM SELECTOR PLAN 1
In ED-AB/  Recommendations as per ICU:  s/p IV Zithromax 500 x1, IV Solumedrol 125 x1, Duoneb  -Keep on BiPAP -increase support to 18/8 to help with work of breathing and lower FIO2 to 30% to keep SPO2 between 88-92%  -Start solumedrol 40mg q8 hours  -Duoneb q6, albuterol PRN, symbicort when off BiPAP  -keep on BiPAP throughout the night   -Continue home Montelukast  -repeat ABG in AM

## 2020-08-02 NOTE — DIETITIAN INITIAL EVALUATION ADULT. - OTHER INFO
80 year old male with PMH of COPD (On home O2 2L and BIPAP at while sleeping regardless of time of day), asthma, CAD (w/ 3v CABG 2004), s/p 2 recent coronary stents at [place], PVD s/p LLE stent (11/2019), HLD, DM2 (on Metformin), BPH, hx Hypercapnic Respiratory Failure/Cardiac Arrest requiring intubation (6/18), with multiple frequent admissions, last in 3/2020 for Pneumonia here for COPD exacerbation, admitted to ICU for acute hypercapnic respiratory failure, COPD exacerbation.    Pt visited at bedside. Alert and conversant. States good appetite/intake prior to admission. Follows regular diet, occasional dietary indiscretion per recall as pt not mindful of carbohydrate portions. Likes fresh fruit, some sweets. -215 pounds, current weight 222 pounds potentially with slight recent weight gain.     Wife very involved in pt's DM management Takes metformin PTA, wife checks fingersticks TID but unaware of usual BG levels. On chronic prednisone, last HgbA1c 7.1% (Oct 2019), current A1c pending.     Pt just arrived so unable to assess current energy intake. Denied N/V/diarrhea/constipation, last BM prior to admission. No difficulties chewing/swallowing.

## 2020-08-02 NOTE — PROGRESS NOTE ADULT - ASSESSMENT
80 y/o with pmhx COPD (On home O2 2L and BIPAP at while sleeping regardless of time of day), asthma, CAD (w/ 3v CABG 2004), s/p 2 recent coronary stents at [place], PVD s/p LLE stent (11/2019), HLD, DM2 (on Metformin), BPH, hx Hypercapnic Respiratory Failure, with multiple frequent admissions, last in 3/2020 for COPD exacerbation presents to [facility] hospital with Shortness of breath x1 day.  Found to have acute on chronic hypercapnic resp failure requiring BiPAP and acute exacerbation of COPD.     Neuro: Stable  CV: HTN/HLD/CAD, continue home dose metoprolol tartrate, lipitor, plavix; F/u echo  Pulm: acute on chronic hypercapnic resp failure 2/2 COPD exacerbation; Off BiPAP; continue azithromycin, duonebs q6h, albuterol PRN, monteleukast, symbicort, sol-medrol 40 mg IVP q8h  Renal: Continue home dose tamsulosin; Hold losartan for now and restart when Cr improves  GI: DASH/TLC diet; Continue protonix   ID: Leukocytosis resolved  Endo: Hyperglycemia; continue ISS  Heme: DVT ppx with heparin; continue plavix  Dispo: Stable for transfer to floor 80 y/o with pmhx COPD (On home O2 2L and BIPAP at while sleeping regardless of time of day), asthma, CAD (w/ 3v CABG 2004), s/p 2 recent coronary stents at [place], PVD s/p LLE stent (11/2019), HLD, DM2 (on Metformin), BPH, hx Hypercapnic Respiratory Failure, with multiple frequent admissions, last in 3/2020 for COPD exacerbation presents to [facility] hospital with Shortness of breath x1 day.  Found to have acute on chronic hypercapnic resp failure requiring BiPAP and acute exacerbation of COPD.     Neuro: Stable  CV: HTN/HLD/CAD, continue home dose metoprolol tartrate, lipitor, plavix; F/u echo  Pulm: acute on chronic hypercapnic resp failure 2/2 COPD exacerbation; Off BiPAP; continue azithromycin, duonebs q6h, albuterol PRN, monteleukast, symbicort; switch from sol-medrol 40 mg IVP q8h to prednisone 20 mg PO qd  Renal: Continue home dose tamsulosin; Hold losartan for now and restart when Cr improves  GI: DASH/TLC diet; Continue protonix   ID: Leukocytosis resolved  Endo: Hyperglycemia; continue ISS  Heme: DVT ppx with heparin; continue plavix  Dispo: Stable for transfer to floor 80 y/o with pmhx COPD (On home O2 2L and BIPAP at while sleeping regardless of time of day), asthma, CAD (w/ 3v CABG 2004), s/p 2 recent coronary stents at [place], PVD s/p LLE stent (11/2019), HLD, DM2 (on Metformin), BPH, hx Hypercapnic Respiratory Failure, with multiple frequent admissions, last in 3/2020 for COPD exacerbation presents to [facility] hospital with Shortness of breath x1 day.  Found to have acute on chronic hypercapnic resp failure requiring BiPAP and acute exacerbation of COPD.     Neuro: Stable  CV: HTN/HLD/CAD, continue home dose metoprolol tartrate, lipitor, plavix; F/u echo  Pulm: acute on chronic hypercapnic resp failure 2/2 COPD exacerbation; BiPAP qhs; continue azithromycin x3 days (last day 8/3), duonebs q6h, albuterol PRN, monteleukast, symbicort; switch from sol-medrol 40 mg IVP q8h to prednisone 20 mg PO qd  Renal: Continue home dose tamsulosin; restart home losartan   GI: DASH/TLC diet; Continue protonix   ID: Leukocytosis resolved, continue azithromycin x3 days (last day 8/3)  Endo: Hyperglycemia; continue ISS  Heme: DVT ppx with heparin; continue plavix  Dispo: Stable for transfer to floor

## 2020-08-02 NOTE — PROGRESS NOTE ADULT - SUBJECTIVE AND OBJECTIVE BOX
Patient seen and examined at bedside.   in ICU, on o2  Reports feeling better  for downgrade to medicine    Review of Systems:  General:denies fever chills, headache, weakness  HEENT: denies blurry vision,diffculty swallowing, difficulty hearing, tinnitus  Cardiovascular: denies chest pain  ,palpitations  Pulmonary:denies shortness of breath, cough, wheezing, hemoptysis  Gastrointestinal: denies abdominal pain, constipation, diarrhea,nausea , vomiting, hematochezia  : denies hematuria, dysuria, or incontinence  Neurological: denies weakness, numbness , tingling, dizziness, tremors  MSK: denies muscle pain, difficulty ambulating, swelling, back pain  skin: denies skin rash, itching, burning, or  skin lesions  Psychiatrical: denies mood disturbances, anxierty, feeling depressed, depression , or difficulty sleeping    Objective:  Vitals  T(C): 36.8 (08-02-20 @ 12:00), Max: 37.2 (08-01-20 @ 20:51)  HR: 100 (08-02-20 @ 13:00) (66 - 112)  BP: 151/105 (08-02-20 @ 13:00) (124/58 - 172/80)  RR: 39 (08-02-20 @ 13:00) (16 - 39)  SpO2: 99% (08-02-20 @ 13:00) (97% - 100%)    Physical Exam:  General: comfortable, no acute distress, well nourished  HEENT: Atraumatic, no LAD, trachea midline, PERRLA  Cardiovascular: normal s1s2, no murmurs, gallops or fricition rubs  Pulmonary: clear to ausculation Bilaterally, no wheezing , rhonchi  Gastrointestinal: soft non tender non distended, no masses felt, no organomegally  Muscloskeletal: ++ lower extremity edema, intact bilateral lower extremity pulses  Neurological: CN II-12 intact. No focal weakness  Psychiatrical: normal mood, cooperative  SKIN: no rash, lesions or ulcers    Labs:                          12.2   7.69  )-----------( 240      ( 02 Aug 2020 05:28 )             40.0     08-02    139  |  103  |  18  ----------------------------<  207<H>  4.7   |  32<H>  |  1.30    Ca    9.9      02 Aug 2020 05:28  Phos  2.1     08-02  Mg     1.9     08-02    TPro  7.4  /  Alb  3.5  /  TBili  0.3  /  DBili  x   /  AST  20  /  ALT  19  /  AlkPhos  112  08-02    LIVER FUNCTIONS - ( 02 Aug 2020 05:28 )  Alb: 3.5 g/dL / Pro: 7.4 g/dL / ALK PHOS: 112 U/L / ALT: 19 U/L / AST: 20 U/L / GGT: x                 Active Medications  MEDICATIONS  (STANDING):  albuterol/ipratropium for Nebulization 3 milliLiter(s) Nebulizer every 6 hours  atorvastatin 40 milliGRAM(s) Oral at bedtime  azithromycin  IVPB 500 milliGRAM(s) IV Intermittent every 24 hours  azithromycin  IVPB      budesonide 160 MICROgram(s)/formoterol 4.5 MICROgram(s) Inhaler 2 Puff(s) Inhalation two times a day  carbamide peroxide Otic Solution 5 Drop(s) Left Ear two times a day  chlorhexidine 2% Cloths 1 Application(s) Topical <User Schedule>  clopidogrel Tablet 75 milliGRAM(s) Oral daily  dextrose 5%. 1000 milliLiter(s) (50 mL/Hr) IV Continuous <Continuous>  dextrose 50% Injectable 12.5 Gram(s) IV Push once  heparin   Injectable 5000 Unit(s) SubCutaneous every 8 hours  insulin lispro (HumaLOG) corrective regimen sliding scale   SubCutaneous Before meals and at bedtime  loratadine 10 milliGRAM(s) Oral daily  metoprolol tartrate 12.5 milliGRAM(s) Oral two times a day  montelukast 10 milliGRAM(s) Oral daily  pantoprazole  Injectable 40 milliGRAM(s) IV Push daily  predniSONE   Tablet 20 milliGRAM(s) Oral daily  sodium phosphate IVPB 30 milliMole(s) IV Intermittent once  tamsulosin 0.4 milliGRAM(s) Oral at bedtime    MEDICATIONS  (PRN):  ALBUTerol   0.5% 2.5 milliGRAM(s) Nebulizer every 2 hours PRN Shortness of Breath and/or Wheezing  dextrose 40% Gel 15 Gram(s) Oral once PRN Blood Glucose LESS THAN 70 milliGRAM(s)/deciliter  glucagon  Injectable 1 milliGRAM(s) IntraMuscular once PRN Glucose LESS THAN 70 milligrams/deciliter

## 2020-08-02 NOTE — PROGRESS NOTE ADULT - ATTENDING COMMENTS
Patient seen and examined, agree with above     81 y/o male with hx HTN, CAD, PCI, CABG, PVD with b/l LE revascularization, DM2 (not on insulin), COPD/asthma on O2 and qhs BiPAP, cardiac arrest (resp mediated) in Oct 2018 (ROSC after 17 mins, s/p hypothermia protocol), now presents with shortness of breath x 1 day. Denies fever, cough, chest pain, n, v, d, body aches, recent travel, sick contacts. Compliant to meds. Not a current smoker.     Significantly improved today     Imp:   Acute on chronic hypercapnic respiratory failure   COPD exacerbation - likely trigger environmental factors    Recs:   BiPAP qhs and prn, NC during the day   Change Solumedrol to prednisone 20 mg daily, taper slowly    Continue DuoNebs, Symbicort  Azithromycin x 3 days    F/u ECHO   Lasix prn   Continue other home meds, resume losartan   DVT ppx with sc heparin     Stable for tele, likely discharge tomorrow if stable

## 2020-08-02 NOTE — DIETITIAN INITIAL EVALUATION ADULT. - ENERGY NEEDS
Wt: 222 pounds, Ht: 70 inches (pt states usual ht is 5'11 but now he is 5'10), BMI: 31.8 kg/m2, IBW: 166 pounds +/-10%, %IBW: 134%  no edema or pressure injuries noted

## 2020-08-02 NOTE — DIETITIAN INITIAL EVALUATION ADULT. - PROBLEM SELECTOR PLAN 8
IMPROVE VTE Individual Risk Assessment: will place on heparin           RISK                                                          Points  [  ] Previous VTE                                                3  [  ] Thrombophilia                                             2  [  ] Lower limb paralysis                                   2        (unable to hold up >15 seconds)    [  ] Current Cancer                                             2         (within 6 months)  [  ] Immobilization > 24 hrs                              1  [ x ] ICU/CCU stay > 24 hours                             1  [ x ] Age > 60                                                         1    IMPROVE VTE Score:         [    2     ]    Total Risk Factor Score:    0 - 1:   Consider IPC  >2 - 3:  Thromboprophylaxis required (enoxaparin or SQ heparin)        >4:   High Risk: Thromboprophylaxis required (enoxaparin or SQ heparin), optional add IPC  **If CONTRAINDICATION to enoxaparin or SQ heparin, USE IPCs**

## 2020-08-02 NOTE — DIETITIAN INITIAL EVALUATION ADULT. - ADD RECOMMEND
DASH/TLC, consistent carbohydrate diet. Pt declined verbal education at this time but agreeable to written materials left at bedside to review with wife. Briefly discussed avoiding concentrated sweets and encouraging po intake of nutrient dense, balanced meals. Could likely benefit from Diabetes NP/Endocrine consult per MD's discretion.

## 2020-08-02 NOTE — PROGRESS NOTE ADULT - ASSESSMENT
AcuteHypercapnic respiratory failure.  Plan: In ED-AB.24/67/223/24  s/p IV Zithromax 500 x1, IV Solumedrol 125 x1, Duoneb  -s/p BiPAP requiring ICU level of care  -now on nasal canula  -c/w  solumedrol 40mg q8 hours  -Duoneb q6, albuterol PRN, symbicort  -keep on BiPAP throughout the night        Hypertension.   Hold home losartan for now and restart when Cr improve  -Hydralazine prn  -monitor routine hemodynamics.     CAD (Coronary Artery Disease).  Plan: Chronic  w/ 3v CABG , s/p 2 recent coronary stents at [place]  -continue home Plavix.     : PVD (peripheral vascular disease).  Plan: Lower leg edema present on admission  h/o s/pLLE stent 2019  -Lasix 20mg IVP x1 as he has increase LE edema and was recently taken off his HCTZ medication  -Check Echo, most recent one in the chart is from 2020, proBNP: 193.     Dyslipidemia. Plan: Chronic  -continue Atorvastatin 40 mg.    Diabetes Mellitus, Type II.  Plan: Chronic  ED-glucose: 195  -home on Metformin 1000 BID  - Insulin sliding scale for diabetes management, if consistently above 200 glucose add lantus.     Medically stable for transfer to floor AcuteHypercapnic respiratory failure.      Initial ER Course with AB.24/67/223/24  s/p IV Zithromax 500 x1, IV Solumedrol 125 x1, Duoneb  -s/p BiPAP requiring ICU level of care  -now on nasal canula  -BNP is low  -Echo benign  -c/w  solumedrol 40mg q8 hours  -Duoneb q6, albuterol PRN, symbicort  -keep on BiPAP throughout the night       Hypertension.   Hold home losartan for now and restart when Cr improved  -Hydralazine prn  -monitor routine hemodynamics.     CAD (Coronary Artery Disease).  Plan: Chronic  w/ 3v CABG , s/p 2 recent coronary stents at [place]  -continue home Plavix.       PVD (peripheral vascular disease).  Lower leg edema present on admission  h/o s/pLLE stent 2019  -sp Lasix 20mg IVP x1 as he has increase LE edema and was recently taken off his HCTZ medication  Plan:  c/w prn lasix    Dyslipidemia. Plan: Chronic  -continue Atorvastatin 40 mg.    Diabetes Mellitus, Type II.   home on Metformin 1000 BID  Plan:  Insulin sliding scale for diabetes management, if consistently above 200 glucose add lantus.     At this  time patient is Medically stable for transfer to floor

## 2020-08-02 NOTE — DIETITIAN INITIAL EVALUATION ADULT. - EST PROTEIN NEEDS5
"    Outpatient Physical Therapy Ortho Treatment Note  [facility]     Patient Name: Rekha Bear  : 1943  MRN: [id number]  Today's Date: 6/15/2018      Visit Date: 06/15/2018    Visit Dx:    ICD-10-CM ICD-9-CM   1. Recurrent pain of right knee M25.561 719.46   2. Osteoarthritis of right knee, unspecified osteoarthritis type M17.11 715.96   3. Difficulty walking R26.2 719.7   4. Gait instability R26.81 781.2       Patient Active Problem List   Diagnosis   • Acute bronchitis   • Chronic pain of right knee   • Chest pain   • Depression   • Type 2 diabetes mellitus with stage 3 chronic kidney disease, without long-term current use of insulin   • Hyperlipidemia   • Hypertension   • Hypothyroidism   • Impaired glucose tolerance   • Renal insufficiency   • Urinary incontinence   • Urinary tract infection   • Cobalamin deficiency   • Cardiac arrhythmia   • Hyperlipemia   • Allergic reaction   • Hx of food anaphylaxis   • Herpes simplex   • Osteoarthritis        Past Medical History:   Diagnosis Date   • Acute bronchitis    • Anxiety    • Chest pain    • Chilblains     \"Chilblian's\"   • Depression    • Diabetes mellitus, type II    • Fatty liver    • GERD (gastroesophageal reflux disease)    • Health care maintenance    • History of mammogram    • Hyperlipidemia    • Hypertension    • Hypothyroid    • Incontinence    • Osteoarthritis     OA  Marvin THR, LT TKR - hydrocodone prescibed by Dr. Almaguer   • Pain of esophagus     \" nervous esophagus\" - she takes the occasional alprazolam   • Peptic ulcer disease    • Prediabetes    • Raynaud's disease     \"Raynauds\"   • Renal disease     followed by Dr. Grewal   • UTI (urinary tract infection)    • Vitamin B 12 deficiency         Past Surgical History:   Procedure Laterality Date   • CATARACT EXTRACTION     • CHOLECYSTECTOMY     • COLONOSCOPY     • COLONOSCOPY N/A 2016    Procedure: COLONOSCOPY with hot snare polypectomy;  Surgeon: George Pabon MD;  " Location: [facility] ENDOSCOPY;  Service:    • FOOT SURGERY     • TONSILLECTOMY     • TOTAL HIP ARTHROPLASTY Bilateral     OA  Marvin THR, LT TKR - hydrocodone prescibed by Dr. Almaguer   • TOTAL KNEE ARTHROPLASTY Left     OA  Marvin THR, LT TKR - hydrocodone prescibed by Dr. Almaguer                             PT Assessment/Plan     Row Name 06/15/18 1515          PT Assessment    Assessment Comments Continue to progress kneee strengthening. Patient continues to c/o of back pain as well.   -WS       User Key  (r) = Recorded By, (t) = Taken By, (c) = Cosigned By    Initials Name Provider Type    DOUGLAS Jain PTA Physical Therapy Assistant                Modalities     Row Name 06/15/18 1400             Ice    Ice Applied Yes  -WS      Location Ice pack to R knee in supine over pilllow  -WS      Rx Minutes 10 mins  -WS      Ice S/P Rx Yes  -WS        User Key  (r) = Recorded By, (t) = Taken By, (c) = Cosigned By    Initials Name Provider Type    DOUGLAS Jain PTA Physical Therapy Assistant                Exercises     Row Name 06/15/18 1400             Subjective Comments    Subjective Comments the shot helped the right knee pain. left knee still sore from falling on it  -WS         Subjective Pain    Pre-Treatment Pain Level 2  -WS         Total Minutes    00212 - PT Therapeutic Exercise Minutes 40  -WS         Exercise 1    Exercise Name 1 NuStep L3  -WS      Time 1 5 min  -WS         Exercise 2    Exercise Name 2 PPT  -WS      Reps 2 15  -WS         Exercise 3    Exercise Name 3 TB H/L hip abduction/ER  -WS      Cueing 3 Verbal  -WS      Reps 3 15  -WS      Additional Comments green   -WS         Exercise 4    Exercise Name 4 H/L hip add   -WS      Cueing 4 Verbal  -WS      Reps 4 15  -WS      Additional Comments ball  -WS         Exercise 5    Exercise Name 5 Bridge  -WS      Cueing 5 Verbal  -WS      Reps 5 15  -WS      Time 5 3s  -WS         Exercise 6    Exercise Name 6 S/L clam  -WS      Cueing 6 Verbal   -WS      Reps 6 15  -WS      Additional Comments tierney  -WS         Exercise 7    Exercise Name 7 Standing bilateral heel raise  -WS      Cueing 7 Verbal  -WS      Reps 7 20  -WS         Exercise 8    Exercise Name 8 Standing hip abduction  -WS      Sets 8 2  -WS      Reps 8 10  -WS      Additional Comments 3# R only  -WS         Exercise 9    Exercise Name 9 LAQ  -WS      Cueing 9 Verbal;Tactile  -WS      Reps 9 20  -WS      Additional Comments 4# B  -WS         Exercise 10    Exercise Name 10 SAQ  -WS      Cueing 10 Verbal;Tactile  -WS      Reps 10 20  -WS      Additional Comments 4# B  -WS         Exercise 11    Exercise Name 11 standing HS curl  -WS      Cueing 11 Verbal  -WS      Reps 11 20  -WS      Additional Comments 3# R only  -WS         Exercise 12    Exercise Name 12 seated TKE into ball  -WS      Cueing 12 Demo;Verbal  -WS      Reps 12 20  -WS      Time 12 5 s  -WS        User Key  (r) = Recorded By, (t) = Taken By, (c) = Cosigned By    Initials Name Provider Type    DOUGLAS Jain, PTA Physical Therapy Assistant                               PT OP Goals     Row Name 06/15/18 1500          PT Short Term Goals    STG Date to Achieve 06/01/18  -     STG 1 Patient will be independent with initial HEP.  -     STG 1 Progress Met  -     STG 2 Patient will deny falls.  -     STG 2 Progress Ongoing  -     STG 3 Patient will demonstrate R knee flexion >/=120 degrees to facilitate ease with stairs.  -     STG 3 Progress Ongoing  -        Long Term Goals    LTG Date to Achieve 07/01/18  -     LTG 1 Patient will be independent with progressive HEP.  -     LTG 1 Progress Ongoing  -     LTG 2 Patient will demonstrate R knee AROM 0-125 to facilitate optimal post operative outcomes.  -     LTG 2 Progress Ongoing  -WS     LTG 3 Patient will demonstrate R hip abd to >/=4/5 per MMT to assist with gait normalization.  -     LTG 3 Progress Ongoing  -     LTG 4 Patient will score </=60%  disability on the KOS-ADL to indicate improved perceived performance with ADLs.  -WS     LTG 4 Progress Ongoing  -       User Key  (r) = Recorded By, (t) = Taken By, (c) = Cosigned By    Initials Name Provider Type    DOUGLAS Jain PTA Physical Therapy Assistant          Therapy Education  Given: HEP, Symptoms/condition management, Pain management, Posture/body mechanics, Edema management  Program: Reinforced  How Provided: Verbal  Provided to: Patient              Time Calculation:   Start Time: 1400  Stop Time: 1450  Time Calculation (min): 50 min  Therapy Suggested Charges     Code   Minutes Charges    25503 (CPT®) Hc Pt Neuromusc Re Education Ea 15 Min      96764 (CPT®) Hc Pt Ther Proc Ea 15 Min 40 3    62851 (CPT®) Hc Gait Training Ea 15 Min      97449 (CPT®) Hc Pt Therapeutic Act Ea 15 Min      86999 (CPT®) Hc Pt Manual Therapy Ea 15 Min      48758 (CPT®) Hc Pt Ther Massage- Per 15 Min      54703 (CPT®) Hc Pt Iontophoresis Ea 15 Min      34442 (CPT®) Hc Pt Elec Stim Ea-Per 15 Min      49665 (CPT®) Hc Pt Ultrasound Ea 15 Min      09361 (CPT®) Hc Pt Self Care/Mgmt/Train Ea 15 Min      Total  40 3        Therapy Charges for Today     Code Description Service Date Service Provider Modifiers Qty    76618672389 HC PT THER PROC EA 15 MIN 6/15/2018 Fidencio Jain PTA GP 3    25751547609 HC PT HOT OR COLD PACK TREAT MCARE 6/15/2018 Fidencio Jain PTA GP 1                    Fidencio Jain PTA  6/15/2018      81.6

## 2020-08-03 ENCOUNTER — TRANSCRIPTION ENCOUNTER (OUTPATIENT)
Age: 80
End: 2020-08-03

## 2020-08-03 VITALS — OXYGEN SATURATION: 95 %

## 2020-08-03 PROCEDURE — 82962 GLUCOSE BLOOD TEST: CPT

## 2020-08-03 PROCEDURE — 83036 HEMOGLOBIN GLYCOSYLATED A1C: CPT

## 2020-08-03 PROCEDURE — 92610 EVALUATE SWALLOWING FUNCTION: CPT

## 2020-08-03 PROCEDURE — 83880 ASSAY OF NATRIURETIC PEPTIDE: CPT

## 2020-08-03 PROCEDURE — 84100 ASSAY OF PHOSPHORUS: CPT

## 2020-08-03 PROCEDURE — 93306 TTE W/DOPPLER COMPLETE: CPT

## 2020-08-03 PROCEDURE — 87798 DETECT AGENT NOS DNA AMP: CPT

## 2020-08-03 PROCEDURE — 96374 THER/PROPH/DIAG INJ IV PUSH: CPT

## 2020-08-03 PROCEDURE — 99239 HOSP IP/OBS DSCHRG MGMT >30: CPT | Mod: GC

## 2020-08-03 PROCEDURE — 80053 COMPREHEN METABOLIC PANEL: CPT

## 2020-08-03 PROCEDURE — 87486 CHLMYD PNEUM DNA AMP PROBE: CPT

## 2020-08-03 PROCEDURE — 84484 ASSAY OF TROPONIN QUANT: CPT

## 2020-08-03 PROCEDURE — 87581 M.PNEUMON DNA AMP PROBE: CPT

## 2020-08-03 PROCEDURE — 71045 X-RAY EXAM CHEST 1 VIEW: CPT

## 2020-08-03 PROCEDURE — 87633 RESP VIRUS 12-25 TARGETS: CPT

## 2020-08-03 PROCEDURE — 94640 AIRWAY INHALATION TREATMENT: CPT

## 2020-08-03 PROCEDURE — 99231 SBSQ HOSP IP/OBS SF/LOW 25: CPT

## 2020-08-03 PROCEDURE — 85027 COMPLETE CBC AUTOMATED: CPT

## 2020-08-03 PROCEDURE — 94760 N-INVAS EAR/PLS OXIMETRY 1: CPT

## 2020-08-03 PROCEDURE — 94660 CPAP INITIATION&MGMT: CPT

## 2020-08-03 PROCEDURE — 99221 1ST HOSP IP/OBS SF/LOW 40: CPT

## 2020-08-03 PROCEDURE — 86769 SARS-COV-2 COVID-19 ANTIBODY: CPT

## 2020-08-03 PROCEDURE — 87635 SARS-COV-2 COVID-19 AMP PRB: CPT

## 2020-08-03 PROCEDURE — 99291 CRITICAL CARE FIRST HOUR: CPT

## 2020-08-03 PROCEDURE — 36600 WITHDRAWAL OF ARTERIAL BLOOD: CPT

## 2020-08-03 PROCEDURE — 93005 ELECTROCARDIOGRAM TRACING: CPT

## 2020-08-03 PROCEDURE — 82803 BLOOD GASES ANY COMBINATION: CPT

## 2020-08-03 PROCEDURE — 83735 ASSAY OF MAGNESIUM: CPT

## 2020-08-03 PROCEDURE — 36415 COLL VENOUS BLD VENIPUNCTURE: CPT

## 2020-08-03 RX ORDER — MONTELUKAST 4 MG/1
1 TABLET, CHEWABLE ORAL
Qty: 0 | Refills: 0 | DISCHARGE

## 2020-08-03 RX ORDER — UMECLIDINIUM 62.5 UG/1
1 AEROSOL, POWDER ORAL
Qty: 0 | Refills: 0 | DISCHARGE

## 2020-08-03 RX ORDER — FAMOTIDINE 10 MG/ML
1 INJECTION INTRAVENOUS
Qty: 60 | Refills: 0
Start: 2020-08-03 | End: 2020-09-01

## 2020-08-03 RX ADMIN — Medication 2: at 08:09

## 2020-08-03 RX ADMIN — Medication 3 MILLILITER(S): at 07:50

## 2020-08-03 RX ADMIN — Medication 3 MILLILITER(S): at 13:22

## 2020-08-03 RX ADMIN — Medication 20 MILLIGRAM(S): at 05:35

## 2020-08-03 RX ADMIN — Medication 3 MILLILITER(S): at 01:22

## 2020-08-03 RX ADMIN — Medication 4: at 12:15

## 2020-08-03 RX ADMIN — HEPARIN SODIUM 5000 UNIT(S): 5000 INJECTION INTRAVENOUS; SUBCUTANEOUS at 13:10

## 2020-08-03 RX ADMIN — CLOPIDOGREL BISULFATE 75 MILLIGRAM(S): 75 TABLET, FILM COATED ORAL at 12:15

## 2020-08-03 RX ADMIN — HEPARIN SODIUM 5000 UNIT(S): 5000 INJECTION INTRAVENOUS; SUBCUTANEOUS at 05:35

## 2020-08-03 RX ADMIN — LORATADINE 10 MILLIGRAM(S): 10 TABLET ORAL at 12:15

## 2020-08-03 RX ADMIN — Medication 12.5 MILLIGRAM(S): at 05:35

## 2020-08-03 RX ADMIN — CARBAMIDE PEROXIDE 5 DROP(S): 81.86 SOLUTION/ DROPS AURICULAR (OTIC) at 05:38

## 2020-08-03 RX ADMIN — AZITHROMYCIN 255 MILLIGRAM(S): 500 TABLET, FILM COATED ORAL at 13:10

## 2020-08-03 RX ADMIN — PANTOPRAZOLE SODIUM 40 MILLIGRAM(S): 20 TABLET, DELAYED RELEASE ORAL at 12:16

## 2020-08-03 RX ADMIN — MONTELUKAST 10 MILLIGRAM(S): 4 TABLET, CHEWABLE ORAL at 12:15

## 2020-08-03 NOTE — SWALLOW BEDSIDE ASSESSMENT ADULT - COMMENTS
Consult received and chart reviewed. The patient was seen at chairside this AM for an initial assessment of swallow function, at which time he was alert and cooperative. Patient currently receiving supplemental O2 via 3L NC in place. He is denying any swallowing difficulties and also denied any pain pre/post today's evaluation. He was agreeable to participate in today's evaluation.    Per charting, the patient is an "81 yo M with PMH of COPD (On home O2 2L and BIPAP at while sleeping regardless of time of day), asthma, CAD (w/ 3v CABG 2004), s/p 2 recent coronary stents at [place], PVD s/p LLE stent (11/2019), HLD, DM2 (on Metformin), BPH, hx Hypercapnic Respiratory Failure/Cardiac Arrest requiring intubation (6/18), with multiple frequent admissions, last in 3/2020 for Pneumonia here for COPD exacerbation." CXR revealed, "Clear lungs."    Discussed results and recommendations from this evaluation with the patient, RN, and call out to MD.

## 2020-08-03 NOTE — SWALLOW BEDSIDE ASSESSMENT ADULT - ASR SWALLOW ASPIRATION MONITOR
upper respiratory infection/throat clearing/gurgly voice/pneumonia/oral hygiene/change of breathing pattern/position upright (90Y)/fever/cough

## 2020-08-03 NOTE — SWALLOW BEDSIDE ASSESSMENT ADULT - SWALLOW EVAL: RECOMMENDED FEEDING/EATING TECHNIQUES
maintain upright posture during/after eating for 30 mins/allow for swallow between intakes/crush medication (when feasible)/position upright (90 degrees)/small sips/bites/oral hygiene/alternate food with liquid

## 2020-08-03 NOTE — SWALLOW BEDSIDE ASSESSMENT ADULT - ADDITIONAL RECOMMENDATIONS
No skilled ST services are warranted at this time. Re-consult this department should there be any new concerns regarding the patient's swallow function during this admission.

## 2020-08-03 NOTE — DISCHARGE NOTE NURSING/CASE MANAGEMENT/SOCIAL WORK - PATIENT PORTAL LINK FT
You can access the FollowMyHealth Patient Portal offered by [facility] by registering at the following website: [email or web address]. By joining [employer]’s FollowMyHealth portal, you will also be able to view your health information using other applications (apps) compatible with our system.

## 2020-08-03 NOTE — SWALLOW BEDSIDE ASSESSMENT ADULT - SWALLOW EVAL: DIAGNOSIS
1. The patient demonstrated functional oral management of puree, solid, and thin liquid textures marked by adequate bolus collection, transfer, and posterior transport. 2. The patient demonstrated a functional 1. The patient demonstrated functional oral management of puree, solid, and thin liquid textures marked by adequate bolus collection, transfer, and posterior transport. 2. The patient demonstrated a functional pharyngeal phase of the swallow for puree, solid, and thin liquid textures marked by a timely pharyngeal swallow trigger with adequate hyolaryngeal elevation upon digital palpation w/o evidence of airway penetration.

## 2020-08-10 ENCOUNTER — APPOINTMENT (OUTPATIENT)
Dept: VASCULAR SURGERY | Facility: CLINIC | Age: 80
End: 2020-08-10
Payer: MEDICARE

## 2020-08-10 PROCEDURE — 93923 UPR/LXTR ART STDY 3+ LVLS: CPT

## 2020-08-10 PROCEDURE — 93925 LOWER EXTREMITY STUDY: CPT

## 2020-08-10 PROCEDURE — 93978 VASCULAR STUDY: CPT

## 2020-08-10 PROCEDURE — 99213 OFFICE O/P EST LOW 20 MIN: CPT

## 2020-08-10 RX ORDER — BLOOD SUGAR DIAGNOSTIC
STRIP MISCELLANEOUS
Qty: 100 | Refills: 0 | Status: ACTIVE | COMMUNITY
Start: 2020-07-17

## 2020-08-10 NOTE — PHYSICAL EXAM
[JVD] : no jugular venous distention  [Normal Heart Sounds] : normal heart sounds [Normal Breath Sounds] : Normal breath sounds [2+] : right 2+ [Abdomen Masses] : No abdominal masses [] : not present [Varicose Veins Of Lower Extremities] : not present [Ankle Swelling (On Exam)] : not present [Oriented to Place] : oriented to place [Skin Ulcer] : no ulcer

## 2020-08-10 NOTE — HISTORY OF PRESENT ILLNESS
[FreeTextEntry1] : Patient with severe peripheral last few disease, status post left iliac and SFA and popliteal artery stent placement. Patient has no significant complaints of rest pain or disabling claudication.

## 2020-08-17 NOTE — PHYSICAL THERAPY INITIAL EVALUATION ADULT - IMPAIRMENTS CONTRIBUTING TO GAIT DEVIATIONS, PT EVAL
decreased ROM/impaired balance/decreased strength/impaired postural control/decreased flexibility inpatient Medical/Surgical team

## 2020-08-17 NOTE — ED PROVIDER NOTE - CPE EDP RESP NORM
Comprehensive Nutrition Assessment    Type and Reason for Visit:  Reassess    Nutrition Recommendations/Plan:   When next  TPN bag is made suggest  Use 3 in 1 TPN using dose wt 73kgm (adjusted wt), 10kcals/kgm, 1.2 grams protein/kgm & 20% kcals from lipids to yield ~ 730kcals, 87.6 grams protein, 69 grams CHO)  Discontinue all ONS per pt request.  MD to advise no BM documented x 10 days. Suggest weigh pt. Nutrition Assessment:     Pt. with no improvement from a nutritional standpoint AEB low ionized calcium, not eating. Remains at risk for further nutritional compromise r/t admitted with ovarian abscess, was intubated this admit & extubated on (8/11) increased nutrient needs for healing s/p exploratory ap, oophorectomy, SB exploration (8/8), post op ileus,  no BM x 10 days documented , and underlying medical condition (Hx impaired glucose, chronic back pain). Nutrition recommendations/interventions as per above. Malnutrition Assessment:  Malnutrition Status: At risk for malnutrition (Comment)    Context:  Acute Illness     Findings of the 6 clinical characteristics of malnutrition:  Energy Intake:  7 - 50% or less of estimated energy requirements for 5 or more days  Weight Loss:  No significant weight loss     Body Fat Loss:  No significant body fat loss     Muscle Mass Loss:  No significant muscle mass loss    Fluid Accumulation:  Unable to assess     Strength:  Not Performed    Estimated Daily Nutrient Needs:  Energy (kcal):  8893-8784 kcal/day (20-24 kcal/kg); Weight Used for Energy Requirements:  (73 kg ABW)     Protein (g):  128+ grams/day (2+ grams/kgm IBW); Weight Used for Protein Requirements:  Ideal        Fluid (ml/day):  per MD; Weight Used for Fluid Requirements:         Nutrition Related Findings:  Pt post op ileus, pt seen, no BM x 10 days per chart, nurse aware. Pt unable to eat \" I get induigestion\". Declines all ONS. Clinimix 4.25/5 infusing 60ml/hr.  Labs: (8/17) Na 132, BUN 26, Creatinine 1.3, Ca IOn 1.08, Glucose 135, chemsticks 134-178, Ca 7.5, Hemoglobin 9..6. Meds: ca replacement protocol, lasix, glycolax. + bowel sounds per RN. Wounds:  (abdominal incision- OR 8/8 - exp lap, oorphorectomy, SB exploration)       Current Nutrition Therapies:    PN-Adult Premix 4.25/5   DIET GENERAL;  · Parenteral Nutrition Orders:  · Type and Formula: Premix Peripheral  (clinimix 4.25/5) via PICC per RN  · Lipids:  0  ·  Rate/Volume: 60ml/hr  · Duration: Continuous  · Current PN Order Provides: 60ml/hr Clinimix 4.25/5 yields 1440ml, 490kcals, 61 grams protein, 72 grams CHO  · Goal PN Orders Provides: When next  TPN bag is made suggest use dose wt 73kgm (adjusted wt), 10kcals/kgm, 1.2 grams protein/kgm & 20% kcals from lipids to yield ~ 730kcals, 87.6 grams protein, 69 grams CHO)      Anthropometric Measures:  · Height: 5' 8\" (172.7 cm)  · Current Body Weight: 221 lb 14.4 oz (100.7 kg)((8/13) + 2 RUE, LUE, RLE, & LLE edema)   · Admission Body Weight: 200 lb 12.8 oz (91.1 kg)(8/7, standing scale, no edema)    · Usual Body Weight: (per EMR: 11/7/19: 209# 12.8 oz, 7/9/20: 205# 14.4 oz)     · Ideal Body Weight: 140 lbs;  · BMI: 33.7  ·      · BMI Categories: Obese Class 1 (BMI 30.0-34. 9)       Nutrition Diagnosis:   · Inadequate oral intake related to (post-op ileus) as evidenced by other (comment), nutrition support - parenteral nutrition, intake 0-25%(pt only on a Clear Liquid diet)      Nutrition Interventions:   Food and/or Nutrient Delivery:  Modify Parenteral Nutrition(try toast as tolerated ( RN mentions Dr Chinmay Medina advised this))  Nutrition Education/Counseling:  No recommendation at this time   Coordination of Nutrition Care:  Continued Inpatient Monitoring    Goals:  Pt will tolerate adequate nutrition support to meet 75% or more of estimated nutritional needs during LOS until able to transition to solely po feed       Nutrition Monitoring and Evaluation:   Behavioral-Environmental Outcomes: Food/Nutrient Intake Outcomes:  Diet Advancement/Tolerance, Food and Nutrient Intake, Parenteral Nutrition Intake/Tolerance  Physical Signs/Symptoms Outcomes:  Biochemical Data, Weight, Skin, Nutrition Focused Physical Findings, Hemodynamic Status, GI Status, Nausea or Vomiting, Fluid Status or Edema     Discharge Planning:     Too soon to determine     Electronically signed by Loco Herring RD, LD on 8/17/20 at 11:19 AM EDT    Contact: [phone] - - -

## 2020-08-25 ENCOUNTER — NON-APPOINTMENT (OUTPATIENT)
Age: 80
End: 2020-08-25

## 2020-08-27 ENCOUNTER — APPOINTMENT (OUTPATIENT)
Dept: PULMONOLOGY | Facility: CLINIC | Age: 80
End: 2020-08-27
Payer: MEDICARE

## 2020-08-27 ENCOUNTER — APPOINTMENT (OUTPATIENT)
Dept: PULMONOLOGY | Facility: CLINIC | Age: 80
End: 2020-08-27

## 2020-08-27 PROCEDURE — 99214 OFFICE O/P EST MOD 30 MIN: CPT | Mod: 95

## 2020-09-23 ENCOUNTER — INPATIENT (INPATIENT)
Facility: HOSPITAL | Age: 80
LOS: 2 days | Discharge: HOME | DRG: 871 | End: 2020-09-26
Attending: INTERNAL MEDICINE | Admitting: STUDENT IN AN ORGANIZED HEALTH CARE EDUCATION/TRAINING PROGRAM
Payer: COMMERCIAL

## 2020-09-23 VITALS
RESPIRATION RATE: 14 BRPM | OXYGEN SATURATION: 100 % | HEART RATE: 115 BPM | HEIGHT: 71 IN | DIASTOLIC BLOOD PRESSURE: 86 MMHG | WEIGHT: 190.04 LBS | SYSTOLIC BLOOD PRESSURE: 189 MMHG | TEMPERATURE: 96 F

## 2020-09-23 DIAGNOSIS — T14.8XXA OTHER INJURY OF UNSPECIFIED BODY REGION, INITIAL ENCOUNTER: Chronic | ICD-10-CM

## 2020-09-23 DIAGNOSIS — Z95.5 PRESENCE OF CORONARY ANGIOPLASTY IMPLANT AND GRAFT: Chronic | ICD-10-CM

## 2020-09-23 LAB
ALBUMIN SERPL ELPH-MCNC: 4 G/DL — SIGNIFICANT CHANGE UP (ref 3.3–5)
ALP SERPL-CCNC: 113 U/L — SIGNIFICANT CHANGE UP (ref 40–120)
ALT FLD-CCNC: 23 U/L — SIGNIFICANT CHANGE UP (ref 12–78)
ANION GAP SERPL CALC-SCNC: 5 MMOL/L — SIGNIFICANT CHANGE UP (ref 5–17)
APPEARANCE UR: ABNORMAL
APTT BLD: 33 SEC — SIGNIFICANT CHANGE UP (ref 27.5–35.5)
AST SERPL-CCNC: 23 U/L — SIGNIFICANT CHANGE UP (ref 15–37)
BACTERIA # UR AUTO: ABNORMAL
BASOPHILS # BLD AUTO: 0.04 K/UL — SIGNIFICANT CHANGE UP (ref 0–0.2)
BASOPHILS NFR BLD AUTO: 0.3 % — SIGNIFICANT CHANGE UP (ref 0–2)
BILIRUB SERPL-MCNC: 0.3 MG/DL — SIGNIFICANT CHANGE UP (ref 0.2–1.2)
BILIRUB UR-MCNC: NEGATIVE — SIGNIFICANT CHANGE UP
BLOOD GAS COMMENTS ARTERIAL: SIGNIFICANT CHANGE UP
BUN SERPL-MCNC: 14 MG/DL — SIGNIFICANT CHANGE UP (ref 7–23)
CALCIUM SERPL-MCNC: 10 MG/DL — SIGNIFICANT CHANGE UP (ref 8.5–10.1)
CHLORIDE SERPL-SCNC: 104 MMOL/L — SIGNIFICANT CHANGE UP (ref 96–108)
CO2 SERPL-SCNC: 33 MMOL/L — HIGH (ref 22–31)
COLOR SPEC: YELLOW — SIGNIFICANT CHANGE UP
COMMENT - URINE: SIGNIFICANT CHANGE UP
CREAT SERPL-MCNC: 1.2 MG/DL — SIGNIFICANT CHANGE UP (ref 0.5–1.3)
DIFF PNL FLD: ABNORMAL
EOSINOPHIL # BLD AUTO: 0.01 K/UL — SIGNIFICANT CHANGE UP (ref 0–0.5)
EOSINOPHIL NFR BLD AUTO: 0.1 % — SIGNIFICANT CHANGE UP (ref 0–6)
EPI CELLS # UR: SIGNIFICANT CHANGE UP
GLUCOSE SERPL-MCNC: 211 MG/DL — HIGH (ref 70–99)
GLUCOSE UR QL: 100 MG/DL
HCT VFR BLD CALC: 44.4 % — SIGNIFICANT CHANGE UP (ref 39–50)
HGB BLD-MCNC: 13.7 G/DL — SIGNIFICANT CHANGE UP (ref 13–17)
HOROWITZ INDEX BLDA+IHG-RTO: 40 — SIGNIFICANT CHANGE UP
IMM GRANULOCYTES NFR BLD AUTO: 2 % — HIGH (ref 0–1.5)
INR BLD: 0.98 RATIO — SIGNIFICANT CHANGE UP (ref 0.88–1.16)
KETONES UR-MCNC: NEGATIVE — SIGNIFICANT CHANGE UP
LACTATE SERPL-SCNC: 1.4 MMOL/L — SIGNIFICANT CHANGE UP (ref 0.7–2)
LEUKOCYTE ESTERASE UR-ACNC: NEGATIVE — SIGNIFICANT CHANGE UP
LYMPHOCYTES # BLD AUTO: 14.9 % — SIGNIFICANT CHANGE UP (ref 13–44)
LYMPHOCYTES # BLD AUTO: 2.19 K/UL — SIGNIFICANT CHANGE UP (ref 1–3.3)
MCHC RBC-ENTMCNC: 27.7 PG — SIGNIFICANT CHANGE UP (ref 27–34)
MCHC RBC-ENTMCNC: 30.9 GM/DL — LOW (ref 32–36)
MCV RBC AUTO: 89.7 FL — SIGNIFICANT CHANGE UP (ref 80–100)
MONOCYTES # BLD AUTO: 0.98 K/UL — HIGH (ref 0–0.9)
MONOCYTES NFR BLD AUTO: 6.7 % — SIGNIFICANT CHANGE UP (ref 2–14)
NEUTROPHILS # BLD AUTO: 11.14 K/UL — HIGH (ref 1.8–7.4)
NEUTROPHILS NFR BLD AUTO: 76 % — SIGNIFICANT CHANGE UP (ref 43–77)
NITRITE UR-MCNC: NEGATIVE — SIGNIFICANT CHANGE UP
NRBC # BLD: 0 /100 WBCS — SIGNIFICANT CHANGE UP (ref 0–0)
NT-PROBNP SERPL-SCNC: 266 PG/ML — SIGNIFICANT CHANGE UP (ref 0–450)
PCO2 BLDA: >103 MMHG — CRITICAL HIGH (ref 32–46)
PH BLDA: 7.09 — CRITICAL LOW (ref 7.35–7.45)
PH UR: 5 — SIGNIFICANT CHANGE UP (ref 5–8)
PLATELET # BLD AUTO: 300 K/UL — SIGNIFICANT CHANGE UP (ref 150–400)
PO2 BLDA: 155 MMHG — HIGH (ref 74–108)
POTASSIUM SERPL-MCNC: 5.2 MMOL/L — SIGNIFICANT CHANGE UP (ref 3.5–5.3)
POTASSIUM SERPL-SCNC: 5.2 MMOL/L — SIGNIFICANT CHANGE UP (ref 3.5–5.3)
PROT SERPL-MCNC: 8.2 G/DL — SIGNIFICANT CHANGE UP (ref 6–8.3)
PROT UR-MCNC: 500 MG/DL
PROTHROM AB SERPL-ACNC: 11.5 SEC — SIGNIFICANT CHANGE UP (ref 10.6–13.6)
RBC # BLD: 4.95 M/UL — SIGNIFICANT CHANGE UP (ref 4.2–5.8)
RBC # FLD: 14.6 % — HIGH (ref 10.3–14.5)
RBC CASTS # UR COMP ASSIST: ABNORMAL /HPF (ref 0–4)
SAO2 % BLDA: 98 % — HIGH (ref 92–96)
SODIUM SERPL-SCNC: 142 MMOL/L — SIGNIFICANT CHANGE UP (ref 135–145)
SP GR SPEC: 1.02 — SIGNIFICANT CHANGE UP (ref 1.01–1.02)
UROBILINOGEN FLD QL: NEGATIVE — SIGNIFICANT CHANGE UP
WBC # BLD: 14.65 K/UL — HIGH (ref 3.8–10.5)
WBC # FLD AUTO: 14.65 K/UL — HIGH (ref 3.8–10.5)
WBC UR QL: SIGNIFICANT CHANGE UP

## 2020-09-23 PROCEDURE — 31500 INSERT EMERGENCY AIRWAY: CPT

## 2020-09-23 PROCEDURE — 99223 1ST HOSP IP/OBS HIGH 75: CPT | Mod: AI

## 2020-09-23 PROCEDURE — 71045 X-RAY EXAM CHEST 1 VIEW: CPT | Mod: 26

## 2020-09-23 PROCEDURE — 99291 CRITICAL CARE FIRST HOUR: CPT | Mod: 25

## 2020-09-23 PROCEDURE — 93010 ELECTROCARDIOGRAM REPORT: CPT

## 2020-09-23 RX ORDER — CHLORHEXIDINE GLUCONATE 213 G/1000ML
15 SOLUTION TOPICAL EVERY 12 HOURS
Refills: 0 | Status: DISCONTINUED | OUTPATIENT
Start: 2020-09-23 | End: 2020-09-24

## 2020-09-23 RX ORDER — FENTANYL CITRATE 50 UG/ML
0.5 INJECTION INTRAVENOUS
Qty: 2500 | Refills: 0 | Status: DISCONTINUED | OUTPATIENT
Start: 2020-09-23 | End: 2020-09-23

## 2020-09-23 RX ORDER — IPRATROPIUM/ALBUTEROL SULFATE 18-103MCG
3 AEROSOL WITH ADAPTER (GRAM) INHALATION EVERY 6 HOURS
Refills: 0 | Status: DISCONTINUED | OUTPATIENT
Start: 2020-09-23 | End: 2020-09-24

## 2020-09-23 RX ORDER — IPRATROPIUM/ALBUTEROL SULFATE 18-103MCG
3 AEROSOL WITH ADAPTER (GRAM) INHALATION ONCE
Refills: 0 | Status: COMPLETED | OUTPATIENT
Start: 2020-09-23 | End: 2020-09-23

## 2020-09-23 RX ORDER — PIPERACILLIN AND TAZOBACTAM 4; .5 G/20ML; G/20ML
3.38 INJECTION, POWDER, LYOPHILIZED, FOR SOLUTION INTRAVENOUS ONCE
Refills: 0 | Status: COMPLETED | OUTPATIENT
Start: 2020-09-23 | End: 2020-09-23

## 2020-09-23 RX ORDER — PROPOFOL 10 MG/ML
5 INJECTION, EMULSION INTRAVENOUS
Qty: 1000 | Refills: 0 | Status: DISCONTINUED | OUTPATIENT
Start: 2020-09-23 | End: 2020-09-24

## 2020-09-23 RX ORDER — VANCOMYCIN HCL 1 G
1000 VIAL (EA) INTRAVENOUS ONCE
Refills: 0 | Status: COMPLETED | OUTPATIENT
Start: 2020-09-23 | End: 2020-09-23

## 2020-09-23 RX ADMIN — FENTANYL CITRATE 4.31 MICROGRAM(S)/KG/HR: 50 INJECTION INTRAVENOUS at 22:28

## 2020-09-23 RX ADMIN — PROPOFOL 3 MICROGRAM(S)/KG/MIN: 10 INJECTION, EMULSION INTRAVENOUS at 23:48

## 2020-09-23 RX ADMIN — Medication 3 MILLILITER(S): at 23:21

## 2020-09-23 RX ADMIN — PIPERACILLIN AND TAZOBACTAM 200 GRAM(S): 4; .5 INJECTION, POWDER, LYOPHILIZED, FOR SOLUTION INTRAVENOUS at 23:40

## 2020-09-23 RX ADMIN — Medication 125 MILLIGRAM(S): at 22:28

## 2020-09-23 RX ADMIN — Medication 3 MILLILITER(S): at 22:47

## 2020-09-23 NOTE — ED PROVIDER NOTE - PHYSICAL EXAMINATION
Constitutional: toxic-appearing, resp distress, diaphoretic. VS-hypertensive, tachycardic tachypneic. unable to speak.  HEAD: NC/AT; no signs of trauma   EYES: Conjunctiva and sclera are clear bilaterally.  ENT: +drooling   CARDIOVASCULAR: Normal S1, S2; tachycardic. No audible murmurs. No chest wall ttp. Radial pulses +2 B/L.  RESPIRATORY: tachypneic, accessory muscle use, poor aeration throughout/tight. Unable to speak.   ABDOMEN: Soft; NTND. obese  SKIN: cold, clammy, diaphoretic.  NEURO: lethargic, altered. staring straight.   MSK: +2 pedal edema b/l

## 2020-09-23 NOTE — ASSESSMENT
[FreeTextEntry1] : Mr. 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 He was recently hospitalized at  in August for acute hypercapnic respiratory failure requiring BiPAP, previously hospitalized in March. Doing well except for sedentary lifestyle and high salt diet. Recently suffered loss of eldest son in June due to DM & CHF.\par \par 1. 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 There is severely reduced diffusing capacity\par - ABG in March 2020 with improved hypercapnia (7.40/45)\par - CBC with diff in Dec 2019 with absolute eos of 180, but he is on prednisone. IgE elevated to 118, and [place] allergen profile positive for house dust. Aspergillus IgE negative. Alpha-1 antitrypsin normal and HIV negative\par - Given bronchial hyperreactivity and peripheral eosinophilia, I suspect Asthma-COPD Overlap Syndrome\par - Continue monthly Mepolizumab injections\par - Continue with Breo Ellipta 200-25 mcg once daily (rinse after use) + Incruse Ellipta 62.5 mcg daily\par - Continue montelukast 10 mg at bedtime\par - Keep prednisone at 5 mg daily, will taper slowly over the next few months\par - Will consider long term azithromcyin 250 MWF\par - Ventolin prn with spacer\par - Keep off roflumilast given no chronic bronchitis symptoms\par - Resume pulmonary rehab\par - Recommend daily regular exercise. Can take a walk outside in the early morning and evening, increase endurance as tolerates. Also has a bike and treadmill in the house that he can use\par - Continue breathing exercises and low carbohydrate diet \par - Reconsider BLVR if RV remains >200% given improved PCO2 to 45 in March 2020. However, ABG upon discharge from  with pH 7.36, pCO2 55 (August 2020)\par \par 2. Chronic hypoxemia and hypercapnic respiratory failure:\par - Likely due to asthma/COPD, although interestingly A-a gradient on ABG on room air (Jan 2020) was normal (I suspect that he did not have enough time off oxygen prior to ABG)\par - Continue supplemental oxygen with nasal cannula 2-3 LPM with exercise\par - BiPAP every night, IPAP 18, EPAP 8\par - 6MWD is 132 meters (but he had to use the bathroom). Exercise oximetry required 2 LPM with exercise\par \par 3. Resolved left lower lobe pneumonia:\par - CT with noted resolution of LLL pneumonia, now with residual atelectasis\par - New atelectasis at the right base\par - Repeat CT chest in early 2021\par \par 4. Bilateral leg edema:\par - Now improved after diuretics given in the hospital\par - Likely related to high salt diet vs. diastolic dysfunction. LV systolic function normal and there is no significant valvular disease\par - Continue low salt diet. Keep legs elevated while sitting down. Compression stockings\par - No evidence of pulmonary hypertension on recent 2D-echo in January 2020\par \par 5. Upper airway cough syndrome:\par - Continue fluticasone nasal spray, 1-2 sprays per nostril twice daily\par \par 6. HCM:\par - Up-to-date with pneumococcal vaccination\par - Needs influenza vaccination in the fall\par - Self-injecting Mepolizumab at home\par \par 7. Follow-up in 1-2 months or sooner prn

## 2020-09-23 NOTE — CONSULT NOTE ADULT - SUBJECTIVE AND OBJECTIVE BOX
YUE COTTO    [facility] ED    Patient is a 80y old  Male who presents with a chief complaint of      Allergies    No Known Allergies    Intolerances    shellfish (Nausea)      HPI:      PAST MEDICAL & SURGICAL HISTORY:  PVD (peripheral vascular disease)    Hypertension    COPD (chronic obstructive pulmonary disease)  on 2L at home and BiPAP at night; intubated     Osteomyelitis    Dyslipidemia    CAD (Coronary Artery Disease)  s/p 3v CABG ; stents placed in [place] in 2019    Diabetes Mellitus, Type II    S/P primary angioplasty with coronary stent    Compound fracture  left leg    CABG (Coronary Artery Bypass Graft)          FAMILY HISTORY:  Family hx of lung cancer  brother,  age 82, used to smoke with pt    Family history of diabetes mellitus (Sibling)          MEDICATIONS   albuterol/ipratropium for Nebulization 3 milliLiter(s) Nebulizer every 6 hours  chlorhexidine 0.12% Liquid 15 milliLiter(s) Oral Mucosa every 12 hours  fentaNYL   Infusion. 0.5 MICROgram(s)/kG/Hr IV Continuous <Continuous>  piperacillin/tazobactam IVPB... 3.375 Gram(s) IV Intermittent once  vancomycin  IVPB. 1000 milliGRAM(s) IV Intermittent once      Vital Signs Last 24 Hrs  T(C): 35.6 (23 Sep 2020 21:44), Max: 35.6 (23 Sep 2020 21:44)  T(F): 96 (23 Sep 2020 21:44), Max: 96 (23 Sep 2020 21:44)  HR: 130 (23 Sep 2020 23:05) (115 - 135)  BP: 197/97 (23 Sep 2020 23:05) (160/94 - 200/96)  BP(mean): --  RR: 22 (23 Sep 2020 23:05) (14 - 22)  SpO2: 100% (23 Sep 2020 23:05) (100% - 100%)        Mode: AC/ CMV (Assist Control/ Continuous Mandatory Ventilation), RR (machine): 22, TV (machine): 450, FiO2: 40, PEEP: 5, ITime: 1, MAP: 11, PIP: 38    LABS:                        13.7   14.65 )-----------( 300      ( 23 Sep 2020 22:04 )             44.4     -    142  |  104  |  14  ----------------------------<  211<H>  5.2   |  33<H>  |  1.20    Ca    10.0      23 Sep 2020 22:04    TPro  8.2  /  Alb  4.0  /  TBili  0.3  /  DBili  x   /  AST    /  ALT  23  /  AlkPhos  113      PT/INR - ( 23 Sep 2020 22:04 )   PT: 11.5 sec;   INR: 0.98 ratio         PTT - ( 23 Sep 2020 22:04 )  PTT:33.0 sec  Urinalysis Basic - ( 23 Sep 2020 22:22 )    Color: Yellow / Appearance: Slightly Turbid / S.025 / pH: x  Gluc: x / Ketone: Negative  / Bili: Negative / Urobili: Negative   Blood: x / Protein: 500 mg/dL / Nitrite: Negative   Leuk Esterase: Negative / RBC: 3-5 /HPF / WBC 0-2   Sq Epi: x / Non Sq Epi: Occasional / Bacteria: Occasional        ABG - ( 23 Sep 2020 22:42 )  pH, Arterial: 7.09  pH, Blood: x     /  pCO2: >103  /  pO2: 155   / HCO3: x     / Base Excess: x     /  SaO2: 98                  WBC:  WBC Count: 14.65 K/uL ( @ 22:04)      MICROBIOLOGY:  RECENT CULTURES:              PT/INR - ( 23 Sep 2020 22:04 )   PT: 11.5 sec;   INR: 0.98 ratio         PTT - ( 23 Sep 2020 22:04 )  PTT:33.0 sec    Sodium:  Sodium, Serum: 142 mmol/L ( @ 22:04)      1.20 mg/dL  @ 22:04      Hemoglobin:  Hemoglobin: 13.7 g/dL ( @ 22:04)      Platelets: Platelet Count - Automated: 300 K/uL ( @ 22:04)      LIVER FUNCTIONS - ( 23 Sep 2020 22:04 )  Alb: 4.0 g/dL / Pro: 8.2 g/dL / ALK PHOS: 113 U/L / ALT: 23 U/L / AST: 23 U/L / GGT: x             Urinalysis Basic - ( 23 Sep 2020 22:22 )    Color: Yellow / Appearance: Slightly Turbid / S.025 / pH: x  Gluc: x / Ketone: Negative  / Bili: Negative / Urobili: Negative   Blood: x / Protein: 500 mg/dL / Nitrite: Negative   Leuk Esterase: Negative / RBC: 3-5 /HPF / WBC 0-2   Sq Epi: x / Non Sq Epi: Occasional / Bacteria: Occasional        RADIOLOGY & ADDITIONAL STUDIES:

## 2020-09-23 NOTE — ED PROVIDER NOTE - CARE PLAN
Principal Discharge DX:	Acute respiratory failure with hypoxia  Secondary Diagnosis:	Chronic obstructive pulmonary disease with acute exacerbation

## 2020-09-23 NOTE — ED ADULT NURSE REASSESSMENT NOTE - NS ED NURSE REASSESS COMMENT FT1
dr Marino aware of increased BP as charted No orders noted @ this time
Patient on bed intubated and connected to ventilator for admission to ICU. On cardiac monitor sinus tachycardia @ 134/min. Noted with cunha catheter draining to a clear yellowish urine to urine bag. Wife at the bedside.

## 2020-09-23 NOTE — ED ADULT NURSE NOTE - NSIMPLEMENTINTERV_GEN_ALL_ED
Implemented All Fall Risk Interventions:  [place] to call system. Call bell, personal items and telephone within reach. Instruct patient to call for assistance. Room bathroom lighting operational. Non-slip footwear when patient is off stretcher. Physically safe environment: no spills, clutter or unnecessary equipment. Stretcher in lowest position, wheels locked, appropriate side rails in place. Provide visual cue, wrist band, yellow gown, etc. Monitor gait and stability. Monitor for mental status changes and reorient to person, place, and time. Review medications for side effects contributing to fall risk. Reinforce activity limits and safety measures with patient and family.

## 2020-09-23 NOTE — HISTORY OF PRESENT ILLNESS
[Home] : at home, [unfilled] , at the time of the visit. [Spouse] : spouse [Medical Office: ([facility])___] : at the medical office located in  [Verbal consent obtained from patient] : the patient, [unfilled] [FreeTextEntry1] : Mr. 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 He was given Azithromycin, Solumedrol, Duonebs. He was noted to have acute on chronic hypercapnic respiratory failure requiring BiPAP. More recently, he was experiencing dyspnea and requiring daytime BiPAP. No hypoxemia. LE edema improved since hospital discharge. His wife gave him prednisone 20 mg and albuterol nebulizer with improvement. He took his Nucala last on 8/14 and he is adherent with meds. He was also given a course of Azithromycin and prolonged prednisone taper. He is currently taking prednisone 20 mg for 5 days, then will decrease to 10 mg for 5 days, then slow taper. Patient reports feeling fine, but wife reports that he has intermittent dyspnea requiring BiPAP. Reports occasional coughing, but no sputum production. Will taper prednisone by 5 mg every 5 days. Will consider long term azithromycin therapy given clinical improvement. BP today is 128/76.\par \par Unfortunately his eldest son recently passed away in June from DM and CHF, so patient and wife are now taking care of grandchildren.\par \par He is adherent with mepolizumab injections every month. Denies any increased lethargy or fatigue. Blood sugar more adequately controlled (90s-120s). Recent A1C in June was 7.1. He is adherent with Breo & Incruse Ellipta and takes montelukast every night. He is adherent with BiPAP (IPAP 18, EPAP 8) every night and wears oxygen 2 liters with exertion or with the BiPAP at night while sleeping. Oxygen saturation drops to lowest 86-88% when he walks without the oxygen, although today only dropped to 92% with walking in the hallway. At rest or with light exertion, oxygen saturation is 94-97%. \par \par Last 2D-echo in January 2020 with mild diastolic dysfunction, normal LV systolic function, no significant valvular disease, and no pulmonary hypertension (estimated RVSP is 15). \par \par ABG in March 2020 with compensated hypercapnia (7.40/45). Baseline PCO2 previously in the upper 50s. \par \par Pulmonary rehab currently on pause due to COVID pandemic. \par \par PFTs (Dec 2019) with very severe obstruction with bronchodilator response (NOT reversible). TLC is normal, but RV is increased to 206%. Diffusion capacity is severely reduced.\par \par Exercise oximetry (Jan 2020) with hypoxemia at rest to 86% requiring 2L NC. Oxygen requirement with exercise also 2 LPM, but he was only able to exercise for 4 minutes prior to needing to use the bathroom.\par \par 6MWD (Jan 2020) is 132 meters.\par \par Last CT chest in June 2020 with interval improvement and near resolution of LLL pneumonia with minimal residual atelectasis. There is new linear atelectasis in the RLL. Stable emphysema.\par \par Regarding his smoking history, he quit smoking in the 1980s, used to smoke 1 ppd for 30 years. Was smoking marijuana about 3-4 joints 3x/week until 2017. No alcohol use. No other drug use

## 2020-09-23 NOTE — ED ADULT NURSE NOTE - OBJECTIVE STATEMENT
received pt in bed 319 Pt BIBA from home unresponsive diaphoretic cool. As per wife pt c/o SOB all day w/ hx COPD & was on his bipap all day w/ no relief. Dr & resp @ bedside CM placed w/ ST EKG done IV's placed 2159 Etomidate 20mg given followed by Rocuronium 2200 Pt intubated without difficulty 7.5 ETT 23 LL vent setting verified  AC 14 02 40 % +5 Gonzáles placed w/ clear yellow urine sent per order

## 2020-09-23 NOTE — ED PROVIDER NOTE - CRITICAL CARE PROVIDED
conducted a detailed discussion of DNR status/consult w/ pt's family directly relating to pts condition/consultation with other physicians/direct patient care (not related to procedure)/additional history taking/interpretation of diagnostic studies/documentation

## 2020-09-23 NOTE — ED PROVIDER NOTE - OBJECTIVE STATEMENT
81 yo M PMH copd/asthma, intubation in the past, multiple bipap bibems for sob/resp distress. Per ems, wife stated that sob started today. Per ems, pt too altered for cpap. pt unable to speak due to resp distress and unable to contribute to hpi/ros.  Per wife pt is worse than baseline. Wants intubation/full code. 81 yo M PMH copd/asthma, intubation in the past, multiple bipap and otherwise as below bibems for sob/resp distress. Per ems, wife stated that sob started today. Per ems, pt too altered for cpap. pt unable to speak due to resp distress and unable to contribute to hpi/ros.  Per wife pt is worse than baseline. Wants intubation/full code.

## 2020-09-23 NOTE — ED PROVIDER NOTE - CLINICAL SUMMARY MEDICAL DECISION MAKING FREE TEXT BOX
81 yo M PMH COPD/asthma, intubated in the past, multiple bipap admissions presents BIBEMS for SOB, resp distress x today.   VS: hypertensive, tachycardic, tachypneic.   Chest tight, poor aeration throughout.   PT lethargic, eyes open but unable to speak, drooling, unable to tolerated secretions  D/w wife, full code   pt intubated shortly upon arrival and still with tight lungs; albuterol down ETT with solumedrol. vanc/zosyn. fentanyl drip for post intubation  consider copd exaceration, covid, pna.   admit to icu 81 yo M PMH COPD/asthma, intubated in the past, multiple bipap admissions, cad, dm presents BIBEMS for SOB, resp distress x today.   upon arrival ill appearing, unable to speak. resp distress  VS: hypertensive, tachycardic, tachypneic.   Chest tight, poor aeration throughout.   PT lethargic, eyes open but unable to speak, drooling, unable to tolerated secretions  D/w wife, full code   pt intubated shortly upon arrival and still with tight lungs; albuterol down ETT with solumedrol. vanc/zosyn. fentanyl drip for post intubation  consider copd exaceration, covid, pna.   admit to icu

## 2020-09-23 NOTE — REVIEW OF SYSTEMS
[Hypertension] : ~T hypertension [Dyspnea] : dyspnea [Diabetes] : diabetes mellitus [Edema] : ~T edema was present [Negative] : Sleep Disorder [Fever] : no fever [Chills] : no chills [Fatigue] : no fatigue [Cough] : no cough [Wheezing] : no wheezing

## 2020-09-24 DIAGNOSIS — J96.01 ACUTE RESPIRATORY FAILURE WITH HYPOXIA: ICD-10-CM

## 2020-09-24 LAB
ALBUMIN SERPL ELPH-MCNC: 4 G/DL — SIGNIFICANT CHANGE UP (ref 3.3–5)
ALP SERPL-CCNC: 117 U/L — SIGNIFICANT CHANGE UP (ref 40–120)
ALT FLD-CCNC: 25 U/L — SIGNIFICANT CHANGE UP (ref 12–78)
ANION GAP SERPL CALC-SCNC: 12 MMOL/L — SIGNIFICANT CHANGE UP (ref 5–17)
AST SERPL-CCNC: 25 U/L — SIGNIFICANT CHANGE UP (ref 15–37)
BASE EXCESS BLDA CALC-SCNC: -3 MMOL/L — LOW (ref -2–2)
BASE EXCESS BLDA CALC-SCNC: -3.5 MMOL/L — LOW (ref -2–2)
BASE EXCESS BLDA CALC-SCNC: 1.6 MMOL/L — SIGNIFICANT CHANGE UP (ref -2–2)
BASE EXCESS BLDA CALC-SCNC: 4.1 MMOL/L — HIGH (ref -2–2)
BASOPHILS # BLD AUTO: 0 K/UL — SIGNIFICANT CHANGE UP (ref 0–0.2)
BASOPHILS NFR BLD AUTO: 0 % — SIGNIFICANT CHANGE UP (ref 0–2)
BILIRUB SERPL-MCNC: 0.6 MG/DL — SIGNIFICANT CHANGE UP (ref 0.2–1.2)
BLOOD GAS COMMENTS ARTERIAL: SIGNIFICANT CHANGE UP
BUN SERPL-MCNC: 20 MG/DL — SIGNIFICANT CHANGE UP (ref 7–23)
CALCIUM SERPL-MCNC: 9.7 MG/DL — SIGNIFICANT CHANGE UP (ref 8.5–10.1)
CHLORIDE SERPL-SCNC: 100 MMOL/L — SIGNIFICANT CHANGE UP (ref 96–108)
CO2 SERPL-SCNC: 27 MMOL/L — SIGNIFICANT CHANGE UP (ref 22–31)
CREAT SERPL-MCNC: 1.8 MG/DL — HIGH (ref 0.5–1.3)
CULTURE RESULTS: NO GROWTH — SIGNIFICANT CHANGE UP
EOSINOPHIL # BLD AUTO: 0 K/UL — SIGNIFICANT CHANGE UP (ref 0–0.5)
EOSINOPHIL NFR BLD AUTO: 0 % — SIGNIFICANT CHANGE UP (ref 0–6)
GLUCOSE SERPL-MCNC: 360 MG/DL — HIGH (ref 70–99)
GRAM STN FLD: SIGNIFICANT CHANGE UP
HCO3 BLDA-SCNC: 20 MMOL/L — LOW (ref 23–27)
HCO3 BLDA-SCNC: 20 MMOL/L — LOW (ref 23–27)
HCO3 BLDA-SCNC: 25 MMOL/L — SIGNIFICANT CHANGE UP (ref 23–27)
HCO3 BLDA-SCNC: 27 MMOL/L — SIGNIFICANT CHANGE UP (ref 23–27)
HCT VFR BLD CALC: 45.3 % — SIGNIFICANT CHANGE UP (ref 39–50)
HGB BLD-MCNC: 13.3 G/DL — SIGNIFICANT CHANGE UP (ref 13–17)
HOROWITZ INDEX BLDA+IHG-RTO: 30 — SIGNIFICANT CHANGE UP
HOROWITZ INDEX BLDA+IHG-RTO: SIGNIFICANT CHANGE UP
LYMPHOCYTES # BLD AUTO: 0.31 K/UL — LOW (ref 1–3.3)
LYMPHOCYTES # BLD AUTO: 2 % — LOW (ref 13–44)
MAGNESIUM SERPL-MCNC: 1.8 MG/DL — SIGNIFICANT CHANGE UP (ref 1.6–2.6)
MCHC RBC-ENTMCNC: 27.7 PG — SIGNIFICANT CHANGE UP (ref 27–34)
MCHC RBC-ENTMCNC: 29.4 GM/DL — LOW (ref 32–36)
MCV RBC AUTO: 94.2 FL — SIGNIFICANT CHANGE UP (ref 80–100)
MONOCYTES # BLD AUTO: 0.62 K/UL — SIGNIFICANT CHANGE UP (ref 0–0.9)
MONOCYTES NFR BLD AUTO: 4 % — SIGNIFICANT CHANGE UP (ref 2–14)
NEUTROPHILS # BLD AUTO: 14.6 K/UL — HIGH (ref 1.8–7.4)
NEUTROPHILS NFR BLD AUTO: 85 % — HIGH (ref 43–77)
NRBC # BLD: SIGNIFICANT CHANGE UP /100 WBCS (ref 0–0)
PCO2 BLDA: 53 MMHG — HIGH (ref 32–46)
PCO2 BLDA: 56 MMHG — HIGH (ref 32–46)
PCO2 BLDA: 56 MMHG — HIGH (ref 32–46)
PCO2 BLDA: 76 MMHG — CRITICAL HIGH (ref 32–46)
PH BLDA: 7.14 — CRITICAL LOW (ref 7.35–7.45)
PH BLDA: 7.23 — LOW (ref 7.35–7.45)
PH BLDA: 7.31 — LOW (ref 7.35–7.45)
PH BLDA: 7.36 — SIGNIFICANT CHANGE UP (ref 7.35–7.45)
PHOSPHATE SERPL-MCNC: 4.6 MG/DL — HIGH (ref 2.5–4.5)
PLATELET # BLD AUTO: 259 K/UL — SIGNIFICANT CHANGE UP (ref 150–400)
PO2 BLDA: 84 MMHG — SIGNIFICANT CHANGE UP (ref 74–108)
PO2 BLDA: 91 MMHG — SIGNIFICANT CHANGE UP (ref 74–108)
PO2 BLDA: 94 MMHG — SIGNIFICANT CHANGE UP (ref 74–108)
PO2 BLDA: 95 MMHG — SIGNIFICANT CHANGE UP (ref 74–108)
POTASSIUM SERPL-MCNC: 5.3 MMOL/L — SIGNIFICANT CHANGE UP (ref 3.5–5.3)
POTASSIUM SERPL-SCNC: 5.3 MMOL/L — SIGNIFICANT CHANGE UP (ref 3.5–5.3)
PROT SERPL-MCNC: 8 G/DL — SIGNIFICANT CHANGE UP (ref 6–8.3)
RBC # BLD: 4.81 M/UL — SIGNIFICANT CHANGE UP (ref 4.2–5.8)
RBC # FLD: 14.4 % — SIGNIFICANT CHANGE UP (ref 10.3–14.5)
SAO2 % BLDA: 94 % — SIGNIFICANT CHANGE UP (ref 92–96)
SAO2 % BLDA: 97 % — HIGH (ref 92–96)
SARS-COV-2 IGG SERPL QL IA: NEGATIVE — SIGNIFICANT CHANGE UP
SARS-COV-2 IGM SERPL IA-ACNC: 0.11 INDEX — SIGNIFICANT CHANGE UP
SARS-COV-2 RNA SPEC QL NAA+PROBE: SIGNIFICANT CHANGE UP
SODIUM SERPL-SCNC: 139 MMOL/L — SIGNIFICANT CHANGE UP (ref 135–145)
SPECIMEN SOURCE: SIGNIFICANT CHANGE UP
SPECIMEN SOURCE: SIGNIFICANT CHANGE UP
WBC # BLD: 15.53 K/UL — HIGH (ref 3.8–10.5)
WBC # FLD AUTO: 15.53 K/UL — HIGH (ref 3.8–10.5)

## 2020-09-24 PROCEDURE — 93306 TTE W/DOPPLER COMPLETE: CPT | Mod: 26

## 2020-09-24 PROCEDURE — 99233 SBSQ HOSP IP/OBS HIGH 50: CPT | Mod: GC

## 2020-09-24 PROCEDURE — 99291 CRITICAL CARE FIRST HOUR: CPT

## 2020-09-24 RX ORDER — SODIUM CHLORIDE 9 MG/ML
1000 INJECTION, SOLUTION INTRAVENOUS
Refills: 0 | Status: DISCONTINUED | OUTPATIENT
Start: 2020-09-24 | End: 2020-09-26

## 2020-09-24 RX ORDER — CLOPIDOGREL BISULFATE 75 MG/1
75 TABLET, FILM COATED ORAL DAILY
Refills: 0 | Status: DISCONTINUED | OUTPATIENT
Start: 2020-09-24 | End: 2020-09-26

## 2020-09-24 RX ORDER — FUROSEMIDE 40 MG
40 TABLET ORAL ONCE
Refills: 0 | Status: COMPLETED | OUTPATIENT
Start: 2020-09-24 | End: 2020-09-24

## 2020-09-24 RX ORDER — PROPOFOL 10 MG/ML
4.99 INJECTION, EMULSION INTRAVENOUS
Qty: 1000 | Refills: 0 | Status: DISCONTINUED | OUTPATIENT
Start: 2020-09-24 | End: 2020-09-24

## 2020-09-24 RX ORDER — INSULIN LISPRO 100/ML
VIAL (ML) SUBCUTANEOUS
Refills: 0 | Status: DISCONTINUED | OUTPATIENT
Start: 2020-09-24 | End: 2020-09-26

## 2020-09-24 RX ORDER — AZITHROMYCIN 500 MG/1
500 TABLET, FILM COATED ORAL EVERY 24 HOURS
Refills: 0 | Status: DISCONTINUED | OUTPATIENT
Start: 2020-09-25 | End: 2020-09-25

## 2020-09-24 RX ORDER — CHLORHEXIDINE GLUCONATE 213 G/1000ML
1 SOLUTION TOPICAL
Refills: 0 | Status: DISCONTINUED | OUTPATIENT
Start: 2020-09-24 | End: 2020-09-26

## 2020-09-24 RX ORDER — METOPROLOL TARTRATE 50 MG
12.5 TABLET ORAL
Qty: 0 | Refills: 0 | DISCHARGE

## 2020-09-24 RX ORDER — CEFTRIAXONE 500 MG/1
1000 INJECTION, POWDER, FOR SOLUTION INTRAMUSCULAR; INTRAVENOUS EVERY 24 HOURS
Refills: 0 | Status: DISCONTINUED | OUTPATIENT
Start: 2020-09-25 | End: 2020-09-26

## 2020-09-24 RX ORDER — SODIUM CHLORIDE 9 MG/ML
1000 INJECTION, SOLUTION INTRAVENOUS ONCE
Refills: 0 | Status: COMPLETED | OUTPATIENT
Start: 2020-09-24 | End: 2020-09-24

## 2020-09-24 RX ORDER — METOPROLOL TARTRATE 50 MG
25 TABLET ORAL EVERY 12 HOURS
Refills: 0 | Status: DISCONTINUED | OUTPATIENT
Start: 2020-09-24 | End: 2020-09-26

## 2020-09-24 RX ORDER — FLUTICASONE FUROATE AND VILANTEROL TRIFENATATE 100; 25 UG/1; UG/1
1 POWDER RESPIRATORY (INHALATION)
Qty: 0 | Refills: 0 | DISCHARGE

## 2020-09-24 RX ORDER — IPRATROPIUM BROMIDE 0.2 MG/ML
1 SOLUTION, NON-ORAL INHALATION EVERY 6 HOURS
Refills: 0 | Status: DISCONTINUED | OUTPATIENT
Start: 2020-09-24 | End: 2020-09-24

## 2020-09-24 RX ORDER — GLUCAGON INJECTION, SOLUTION 0.5 MG/.1ML
1 INJECTION, SOLUTION SUBCUTANEOUS ONCE
Refills: 0 | Status: DISCONTINUED | OUTPATIENT
Start: 2020-09-24 | End: 2020-09-26

## 2020-09-24 RX ORDER — INSULIN GLARGINE 100 [IU]/ML
10 INJECTION, SOLUTION SUBCUTANEOUS ONCE
Refills: 0 | Status: COMPLETED | OUTPATIENT
Start: 2020-09-24 | End: 2020-09-24

## 2020-09-24 RX ORDER — ALBUTEROL 90 UG/1
2 AEROSOL, METERED ORAL EVERY 6 HOURS
Refills: 0 | Status: DISCONTINUED | OUTPATIENT
Start: 2020-09-24 | End: 2020-09-24

## 2020-09-24 RX ORDER — PANTOPRAZOLE SODIUM 20 MG/1
40 TABLET, DELAYED RELEASE ORAL DAILY
Refills: 0 | Status: DISCONTINUED | OUTPATIENT
Start: 2020-09-24 | End: 2020-09-24

## 2020-09-24 RX ORDER — ASPIRIN/CALCIUM CARB/MAGNESIUM 324 MG
81 TABLET ORAL DAILY
Refills: 0 | Status: DISCONTINUED | OUTPATIENT
Start: 2020-09-24 | End: 2020-09-26

## 2020-09-24 RX ORDER — CEFTRIAXONE 500 MG/1
1000 INJECTION, POWDER, FOR SOLUTION INTRAMUSCULAR; INTRAVENOUS ONCE
Refills: 0 | Status: COMPLETED | OUTPATIENT
Start: 2020-09-24 | End: 2020-09-24

## 2020-09-24 RX ORDER — MAGNESIUM SULFATE 500 MG/ML
2 VIAL (ML) INJECTION ONCE
Refills: 0 | Status: COMPLETED | OUTPATIENT
Start: 2020-09-24 | End: 2020-09-24

## 2020-09-24 RX ORDER — ALBUTEROL 90 UG/1
2 AEROSOL, METERED ORAL
Qty: 0 | Refills: 0 | DISCHARGE

## 2020-09-24 RX ORDER — INSULIN LISPRO 100/ML
8 VIAL (ML) SUBCUTANEOUS ONCE
Refills: 0 | Status: COMPLETED | OUTPATIENT
Start: 2020-09-24 | End: 2020-09-24

## 2020-09-24 RX ORDER — AZITHROMYCIN 500 MG/1
TABLET, FILM COATED ORAL
Refills: 0 | Status: DISCONTINUED | OUTPATIENT
Start: 2020-09-24 | End: 2020-09-25

## 2020-09-24 RX ORDER — ENOXAPARIN SODIUM 100 MG/ML
40 INJECTION SUBCUTANEOUS DAILY
Refills: 0 | Status: DISCONTINUED | OUTPATIENT
Start: 2020-09-24 | End: 2020-09-26

## 2020-09-24 RX ORDER — INSULIN LISPRO 100/ML
VIAL (ML) SUBCUTANEOUS EVERY 4 HOURS
Refills: 0 | Status: DISCONTINUED | OUTPATIENT
Start: 2020-09-24 | End: 2020-09-24

## 2020-09-24 RX ORDER — DEXTROSE 50 % IN WATER 50 %
12.5 SYRINGE (ML) INTRAVENOUS ONCE
Refills: 0 | Status: DISCONTINUED | OUTPATIENT
Start: 2020-09-24 | End: 2020-09-26

## 2020-09-24 RX ORDER — DEXTROSE 50 % IN WATER 50 %
15 SYRINGE (ML) INTRAVENOUS ONCE
Refills: 0 | Status: DISCONTINUED | OUTPATIENT
Start: 2020-09-24 | End: 2020-09-26

## 2020-09-24 RX ORDER — CEFTRIAXONE 500 MG/1
INJECTION, POWDER, FOR SOLUTION INTRAMUSCULAR; INTRAVENOUS
Refills: 0 | Status: DISCONTINUED | OUTPATIENT
Start: 2020-09-24 | End: 2020-09-26

## 2020-09-24 RX ORDER — AZITHROMYCIN 500 MG/1
500 TABLET, FILM COATED ORAL ONCE
Refills: 0 | Status: COMPLETED | OUTPATIENT
Start: 2020-09-24 | End: 2020-09-24

## 2020-09-24 RX ORDER — PANTOPRAZOLE SODIUM 20 MG/1
40 TABLET, DELAYED RELEASE ORAL
Refills: 0 | Status: DISCONTINUED | OUTPATIENT
Start: 2020-09-24 | End: 2020-09-25

## 2020-09-24 RX ORDER — INSULIN LISPRO 100/ML
VIAL (ML) SUBCUTANEOUS EVERY 6 HOURS
Refills: 0 | Status: DISCONTINUED | OUTPATIENT
Start: 2020-09-24 | End: 2020-09-24

## 2020-09-24 RX ORDER — IPRATROPIUM/ALBUTEROL SULFATE 18-103MCG
3 AEROSOL WITH ADAPTER (GRAM) INHALATION EVERY 6 HOURS
Refills: 0 | Status: DISCONTINUED | OUTPATIENT
Start: 2020-09-24 | End: 2020-09-26

## 2020-09-24 RX ORDER — METOPROLOL TARTRATE 50 MG
2.5 TABLET ORAL EVERY 6 HOURS
Refills: 0 | Status: DISCONTINUED | OUTPATIENT
Start: 2020-09-24 | End: 2020-09-24

## 2020-09-24 RX ORDER — INSULIN LISPRO 100/ML
VIAL (ML) SUBCUTANEOUS AT BEDTIME
Refills: 0 | Status: DISCONTINUED | OUTPATIENT
Start: 2020-09-24 | End: 2020-09-26

## 2020-09-24 RX ORDER — FENTANYL CITRATE 50 UG/ML
50 INJECTION INTRAVENOUS
Refills: 0 | Status: DISCONTINUED | OUTPATIENT
Start: 2020-09-24 | End: 2020-09-24

## 2020-09-24 RX ADMIN — Medication 1 PUFF(S): at 02:03

## 2020-09-24 RX ADMIN — Medication 40 MILLIGRAM(S): at 01:55

## 2020-09-24 RX ADMIN — FENTANYL CITRATE 50 MICROGRAM(S): 50 INJECTION INTRAVENOUS at 01:42

## 2020-09-24 RX ADMIN — Medication 50 GRAM(S): at 06:11

## 2020-09-24 RX ADMIN — ENOXAPARIN SODIUM 40 MILLIGRAM(S): 100 INJECTION SUBCUTANEOUS at 11:58

## 2020-09-24 RX ADMIN — Medication 6: at 14:45

## 2020-09-24 RX ADMIN — Medication 1 PUFF(S): at 13:53

## 2020-09-24 RX ADMIN — Medication 40 MILLIGRAM(S): at 20:38

## 2020-09-24 RX ADMIN — CHLORHEXIDINE GLUCONATE 15 MILLILITER(S): 213 SOLUTION TOPICAL at 17:52

## 2020-09-24 RX ADMIN — FENTANYL CITRATE 50 MICROGRAM(S): 50 INJECTION INTRAVENOUS at 01:27

## 2020-09-24 RX ADMIN — Medication 25 MILLIGRAM(S): at 22:51

## 2020-09-24 RX ADMIN — Medication 2.5 MILLIGRAM(S): at 01:55

## 2020-09-24 RX ADMIN — AZITHROMYCIN 255 MILLIGRAM(S): 500 TABLET, FILM COATED ORAL at 02:27

## 2020-09-24 RX ADMIN — CEFTRIAXONE 1000 MILLIGRAM(S): 500 INJECTION, POWDER, FOR SOLUTION INTRAMUSCULAR; INTRAVENOUS at 10:58

## 2020-09-24 RX ADMIN — PANTOPRAZOLE SODIUM 40 MILLIGRAM(S): 20 TABLET, DELAYED RELEASE ORAL at 05:56

## 2020-09-24 RX ADMIN — Medication 2.5 MILLIGRAM(S): at 11:59

## 2020-09-24 RX ADMIN — PIPERACILLIN AND TAZOBACTAM 3.38 GRAM(S): 4; .5 INJECTION, POWDER, LYOPHILIZED, FOR SOLUTION INTRAVENOUS at 00:22

## 2020-09-24 RX ADMIN — Medication 40 MILLIGRAM(S): at 11:59

## 2020-09-24 RX ADMIN — PROPOFOL 3 MICROGRAM(S)/KG/MIN: 10 INJECTION, EMULSION INTRAVENOUS at 11:58

## 2020-09-24 RX ADMIN — Medication 4: at 17:53

## 2020-09-24 RX ADMIN — Medication 250 MILLIGRAM(S): at 00:32

## 2020-09-24 RX ADMIN — Medication 8 UNIT(S): at 02:08

## 2020-09-24 RX ADMIN — ALBUTEROL 2 PUFF(S): 90 AEROSOL, METERED ORAL at 13:54

## 2020-09-24 RX ADMIN — Medication 2.5 MILLIGRAM(S): at 05:57

## 2020-09-24 RX ADMIN — PROPOFOL 3 MICROGRAM(S)/KG/MIN: 10 INJECTION, EMULSION INTRAVENOUS at 03:08

## 2020-09-24 RX ADMIN — CHLORHEXIDINE GLUCONATE 15 MILLILITER(S): 213 SOLUTION TOPICAL at 05:56

## 2020-09-24 RX ADMIN — Medication 3 MILLILITER(S): at 20:16

## 2020-09-24 RX ADMIN — Medication 2.5 MILLIGRAM(S): at 17:52

## 2020-09-24 RX ADMIN — Medication 1 PUFF(S): at 07:10

## 2020-09-24 RX ADMIN — ALBUTEROL 2 PUFF(S): 90 AEROSOL, METERED ORAL at 07:10

## 2020-09-24 RX ADMIN — Medication 40 MILLIGRAM(S): at 04:06

## 2020-09-24 RX ADMIN — ALBUTEROL 2 PUFF(S): 90 AEROSOL, METERED ORAL at 02:03

## 2020-09-24 RX ADMIN — PROPOFOL 3 MICROGRAM(S)/KG/MIN: 10 INJECTION, EMULSION INTRAVENOUS at 06:55

## 2020-09-24 RX ADMIN — Medication 10: at 06:06

## 2020-09-24 RX ADMIN — CHLORHEXIDINE GLUCONATE 1 APPLICATION(S): 213 SOLUTION TOPICAL at 05:57

## 2020-09-24 RX ADMIN — SODIUM CHLORIDE 16.67 MILLILITER(S): 9 INJECTION, SOLUTION INTRAVENOUS at 11:52

## 2020-09-24 RX ADMIN — INSULIN GLARGINE 10 UNIT(S): 100 INJECTION, SOLUTION SUBCUTANEOUS at 10:56

## 2020-09-24 NOTE — PROVIDER CONTACT NOTE (EICU) - BACKGROUND
81 y/o male with PMH significant for DM2 on insulin and metformin, HTN, COPD on home O2 3 L and BIPAP, CAD h/o CABG 2004, PVD with LLE stent 2019, h/o cardiac arrest 2018 and with 2 recent stents at [facility]. Wife reported that pt had shortness of breath overnight. He had gone to sleep with BIPAP. No recent fevers or increased/change in sputum. BP also noted to be 220/100. EMS called.   Pt intubated in the ED as was unable to speak and in severe respiratory distress.   Given dose of vanco and zosyn in addition to solumedrol 125 mg, and duoneb.

## 2020-09-24 NOTE — PROGRESS NOTE ADULT - ASSESSMENT
cont rx   cont rx      COMMODORE YUE [facility] P [phone]  1940 DOA 2020     REVIEW OF SYMPTOMS      Able to give ROS  NO     PHYSICAL EXAM    HEENT Unremarkable PERRLA atraumatic   RESP Fair air entry EXP prolonged    Harsh breath sound Resp distres mild   CARDIAC S1 S2 No S3     NO JVD    ABDOMEN SOFT BS PRESENT NOT DISTENDED No hepatosplenomegaly PEDAL EDEMA present No calf tenderness  NO rash   GENERAL Not TOXIC looking    PT DATA/BEST PRACTICE  ALLERGY     shellfish                WT     2020 100                                 BMI       2020 30                                      ADVANCED DIRECTIVE     LINES TUBES POA.  PROCEDURES.     2020 Intubated                HEAD OF BED ELEVATION Yes      DVT PROPHYLAXIS.   lvnx 40 )      DYSPHAGIA EVAL .    DIET.. dash ()                                                                           IV F.  MCCORMICK PROPHYLAXIS.   INFECTION PPLX       chlorhexidine 2% ()                                                 OXYGENATION        VITALS/LABS       2020 99f 94 120/60   2020 u 1000  2020 2p ac 18/500/5/.3       PATIENT SUMMARY.   79 m former smoker PMH HTN, DM2 (on home Metformin and glipizide), COPD (2L home/ BIPAP at night), CAD with 3v CABG 2004, Stent 2019 HLD, 10/26/2018 ECHO ef 55% hx hypercapnic resp failure with ho intubation c/b with cardiac arrest (2018) with ho recurrent admissions GOLD D admitted 2020 with progressive sob intubated in er Pulm consulted      COURSE  COPD rxed with bd steroids  INFECTION rxed with rocephin () azithro ()   VENT Weaned off vent Intubated  Extubated    ABG 2020 6p cpap5/10/.3  736/53/94   MERRILL Cr increased to 1.6 on 2020   COVID Ruled out 2020              PROBLEM/ASSESSMENT/RECOMMENDATIONS.         MERRILL Monitor cr  RESP FAILURE Monitor need for reintubn Will benefit from noct bpap   COPD ex Cont bd steroids           TIME SPENT   Over 25 minutes aggregate care time spent on encounter; activities included   direct patient care, counseling and/or coordinating care reviewing notes, lab data/ imaging , discussion with multidisciplinary team/ patient  /family and explaining in detail risks, benefits, alternatives  of the recommendations     COMMRODNEYORE [facility]UE [facility] P [phone]  1940 DOA 2020

## 2020-09-24 NOTE — DIETITIAN INITIAL EVALUATION ADULT. - PROBLEM SELECTOR PLAN 1
- pt presenting in severe respiratory distress with significant hypercapnia, intubated in the ED. Admit to ICU  - Resp failure 2/2 acute COPD exacerbation.   - R/o underlying pneumonia. pt currently afebrile, but with leukocytosis . CXR pending. s/p vanc, zosyn x 1 in ED . c/w abx. check urine legionella, s.pneumo  - s/p solumedrol 125mg x 1. c/w solumedrol   - c/w nebulizers  - vent management per ICU. sedation per icu.  - pulmonary Dr. Dejesus following  - outpatient pulm Dr. Marcelino aware pt admitted

## 2020-09-24 NOTE — PROGRESS NOTE ADULT - PROBLEM/PLAN-3
DISPLAY PLAN FREE TEXT Adequate: hears normal conversation without difficulty Adequate: hears normal conversation without difficulty

## 2020-09-24 NOTE — H&P ADULT - NSHPREVIEWOFSYSTEMS_GEN_ALL_CORE
Unable to obtain , sedated and intubated    Per wife     denies CP, palpitations (though he was recently given a ?holter monitor by his cardiologist but not for any specific complaint)    admited shortness of breath, denies increased cough from baseline, sputum production, wheezing    denies fevers, chills, sick contacts    denies urinary complaints    denies change in mental status

## 2020-09-24 NOTE — H&P ADULT - NSHPSOCIALHISTORY_GEN_ALL_CORE
Tobacco: Denies, former smoker-quit 30 years ago, 1ppd x 20 years  EtOH: Denies  Recreational drug use: former frequent marijuana - last use 2-3 yrs ago  Lives with: Wife  Ambulates: Without assistance

## 2020-09-24 NOTE — CONSULT NOTE ADULT - ASSESSMENT
79 yo M with PMH of COPD (On home O2 3L and BIPAP at while sleeping regardless of time of day), asthma, CAD (w/ 3v CABG 2004), s/p 2 recent coronary stents at [place], PVD s/p LLE stent (11/2019), HLD, DM2 (on Metformin and insulin while on steroid tapers), BPH, hx Hypercapnic Respiratory Failure/Cardiac Arrest requiring intubation (6/18), with multiple frequent admissions, last in 8/2020 Respiratory failure. Presents to the ER this time for respiratory distress and elevated BP. Was acutely dyspneic in the ER so was intubated. Treated for COPD exac. ADmit to ICU    Plan discussed with EICU attending Dr. Sanchez     Problem List:  1) Acute hypercapnic resp failure  2) COPD exacerbation  3) asthma exacerbation  4) r/o PNA    NEURO: sedate with propofol and use fentanyl PRN for pain or discomfort. Aim to keep adequately sedated to avoid tachypnea and breath stacking given severe COPD and asthma    CV: Hypertensive at this time, dose lasix 40mg IVP x1 as he appears volume overloaded on exam. Start lopressor 2.5mg q6 hours as he is on lopressor at home and given sinus tach may be in beta blocker withdrawal. Treat pain with fentanyl. Avoid autoPEEP. If remains in sinus tach may need to obtain CTA to r/o PE however less likely given BNP of 200, trop 0.05, wheeze on exam. Check echo     PULM: hypercapnic failure, known well to be for frequent COPD and asthma exacerbations, now intubated. Aim for prolonged I to E time to allow for adequate exhalation, decrease RR to 18, increase TV to 500, increase inspiratory flow rate to 70, I to E time now 1 to 3.3. Decrease Fio2 to 30%. Check ABG, allow for permissive hypercapnia, will not correct CO2 to normal, Ph 7.15 and above OK for now while he recovers from acute exacerbation. Steroids and bronchodilators RTC. Start azithromycin     GI: NPO for now    RENAL: no evidence for MERRILL, making urine, dose lasix, check labs in AM     ID: treat for suspected PNA with ceftriaxone and azithromycin     HEME: dvt-P with lovenox    ENDO: insulin sliding scale, keep BS <180    DISPO: ICU critically ill, would benefit from 24-48 hours of intubation to rest his diaphragm
      COMMODORE TURNER [facility] P [phone]  1940 DOA 2020  ALLERGY [place]  CONTACT Sp Amira               Initial evaluation/Pulmonary Critical Care consultation requested on 2020   by ER MD    from Dr Dejesus   Patient examined chart reviewed    HOSPITAL ADMISSION   PATIENT CAME  FROM (if information available)      COMMODORE TURNER [facility] P [phone]  1940 DOA 2020     REVIEW OF SYMPTOMS      Able to give ROS  NO     PHYSICAL EXAM    HEENT Unremarkable PERRLA atraumatic   RESP Fair air entry EXP prolonged    Harsh breath sound Resp distres mild   CARDIAC S1 S2 No S3     NO JVD    ABDOMEN SOFT BS PRESENT NOT DISTENDED No hepatosplenomegaly PEDAL EDEMA present No calf tenderness  NO rash   GENERAL Not TOXIC looking      PATIENT SUMMARY.   79 m former smoker PMH HTN, DM2 (on home Metformin and glipizide), COPD (2L home/ BIPAP at night), CAD with 3v CABG , Stent 2019 HLD, 10/26/2018 ECHO ef 55% hx hypercapnic resp failure with ho intubation c/b with cardiac arrest (2018) with ho recurrent admissions GOLD D admitted 2020 with progressive sob intubated in er Pulm consulted      PROBLEM/ASSESSMENT/RECOMMENDATIONS.    INFECTION  W 2020 w 14.6   A/R   2020 Sugges blod sputum c and bsab    AOC HYPERCAPNIC RESP FAILURE  ABG 2020 10 ac 14/450/.4/5 709/103/155  A/R  2020 Monitor serial po abg  2020 target po 90-95 PO2 60-75 pH 730 (+) PPLAT 35 (-)     INTUBATION  2020 Intubated                                   CARDIAC  ECHO 2020 ef 55% pasp 15 dd   ECHO 3/12/2019 ef 55% dd1 interatrial septum aneurysm      BNP 2020 bnp 266  A/R  2020 Monitor enzymes fluid status     COPD ex  A/R   2020 BS steroids     TIME SPENT   Over 55 minutes aggregate care time spent on encounter; activities included   direct patient care, counseling and/or coordinating care reviewing notes, lab data/ imaging , discussion with multidisciplinary team/ patient  /family and explaining in detail risks, benefits, alternatives  of the recommendations     REBEKAHRODNEYALAN TURNER [facility] [phone]  1940 DOA 2020

## 2020-09-24 NOTE — PROGRESS NOTE ADULT - PROBLEM SELECTOR PLAN 3
Chronic  w/ 3v CABG 2004, s/p 2 recent coronary stents at [place]  -resume home Plavix when able

## 2020-09-24 NOTE — H&P ADULT - PROBLEM SELECTOR PLAN 1
- pt presenting in severe respiratory distress with significant hypercapnia, intubated in the ED. Admit to ICU  - Resp failure 2/2 acute COPD exacerbation.   - R/o underlying pneumonia. pt currently afebrile, but with leukocytosis . CXR pending. s/p vanc, zosyn x 1 in ED . c/w abx. check urine legionella, s.pneumo  - s/p solumedrol 125mg x 1. c/w solumedrol   - c/w nebulizers  - vent management per ICU. sedation per icu. - pt presenting in severe respiratory distress with significant hypercapnia, intubated in the ED. Admit to ICU  - Resp failure 2/2 acute COPD exacerbation.   - R/o underlying pneumonia. pt currently afebrile, but with leukocytosis . CXR pending. s/p vanc, zosyn x 1 in ED . c/w abx. check urine legionella, s.pneumo  - s/p solumedrol 125mg x 1. c/w solumedrol   - c/w nebulizers  - vent management per ICU. sedation per icu.  - pulmonary Dr. Dejesus following  - outpatient pulm Dr. Marcelino aware pt admitted

## 2020-09-24 NOTE — H&P ADULT - PROBLEM SELECTOR PLAN 3
Chronic  w/ 3v CABG 2004, s/p 2 recent coronary stents at [place]  -continue home Plavix Chronic  w/ 3v CABG 2004, s/p 2 recent coronary stents at [place]  -resume home Plavix when able

## 2020-09-24 NOTE — H&P ADULT - NSHPPHYSICALEXAM_GEN_ALL_CORE
Vital Signs Last 24 Hrs  T(C): 35.6 (23 Sep 2020 21:44), Max: 35.6 (23 Sep 2020 21:44)  T(F): 96 (23 Sep 2020 21:44), Max: 96 (23 Sep 2020 21:44)  HR: 130 (23 Sep 2020 23:05) (115 - 135)  BP: 197/97 (23 Sep 2020 23:05) (160/94 - 200/96)  BP(mean): --  RR: 22 (23 Sep 2020 23:05) (14 - 22)  SpO2: 100% (23 Sep 2020 23:05) (100% - 100%)    GENERAL: patient sedated , intubated  HEENT: NCAT, PERRLA, moist mucous membranes   Neck: Supple, nontender, no mass  Neurology: sedated, moving all extremities to noxious stimuli   Respiratory: diffuse end expiratory wheeze, no rales or rhonchi  CV: tachycardic, +S1/S2, no murmurs, rubs or gallops  Abdominal: Soft, NT, distended, +BSx4  Extremities: + peripheral pulses, 2+ pitting edema b/l LE, venous stasis changes b/l LE   Skin: warm, dry, normal color

## 2020-09-24 NOTE — DIETITIAN INITIAL EVALUATION ADULT. - ENTERAL
If pt to remain intubated recommend NGT/OGT of Glucerna 1.5 @ starting rate 30cc/hr & increase by 10cc q 8hrs until goal rate 70cc/hr x 20hrs. If pt to remain on propofol recommend decreasing TF goal rate to 50cc/hr x 20hrs.

## 2020-09-24 NOTE — PROVIDER CONTACT NOTE (EICU) - ASSESSMENT
Acute hypercarbic and hypoxic respiratory failure req intubation   COPD exacerbation on home O2 and BIPAP

## 2020-09-24 NOTE — H&P ADULT - HISTORY OF PRESENT ILLNESS
79 yo M with PMH of COPD (On home O2 3L and BIPAP at while sleeping regardless of time of day), asthma, CAD (w/ 3v CABG 2004), s/p 2 recent coronary stents at [place], PVD s/p LLE stent (11/2019), HLD, DM2 (on Metformin and insulin while on steroid tapers), BPH, hx Hypercapnic Respiratory Failure/Cardiac Arrest requiring intubation (6/18), with multiple frequent admissions, last in 8/2020 Respiratory failure. Wife is at bedside, pt is intubated in the ED. Wife reports that pt was in his normal state of health prior to yesterday evening. He went to sleep and put on his bipap. Overnight and into the early morning pt had shortness of breath. He took off the bipap and put on 4L O2. This did not provide any relief so he switched back to the bipap. This continued all night and into the afternoon. Pts only complaint was shortness of breath. Denied any fever, chills, wheezing, increased cough or sputum production. Pt had no known sick contacts. In the evening wife took pts blood pressure and noted it was 220/>100. Due to respiratory distress and elevated blood pressure she called EMS.     In the ED pt was unable to speak and was in severe respiratory distress so he was intubated. CBC, CMP, coags, lactate, probnp, and procalcitonin were obtained. Significant for leukocytosis 14.65, co233, glucose 211, lactate and troponin wnl. ABG 7.09/>103/155. UA moderate blood. Pt was started on vanc, zosyn. given duoneb x 1, solumedrol 125mg x 1. CXR, EKG pending.

## 2020-09-24 NOTE — DIETITIAN INITIAL EVALUATION ADULT. - PROBLEM SELECTOR PLAN 6
IMPROVE VTE Individual Risk Assessment: prophylactic measures per icu          RISK                                                          Points  [  ] Previous VTE                                                3  [  ] Thrombophilia                                             2  [  ] Lower limb paralysis                                   2        (unable to hold up >15 seconds)    [  ] Current Cancer                                             2         (within 6 months)  [  ] Immobilization > 24 hrs                              1  [ x ] ICU/CCU stay > 24 hours                             1  [ x ] Age > 60                                                         1    IMPROVE VTE Score:         [    2     ]    Total Risk Factor Score:    0 - 1:   Consider IPC  >2 - 3:  Thromboprophylaxis required (enoxaparin or SQ heparin)        >4:   High Risk: Thromboprophylaxis required (enoxaparin or SQ heparin), optional add IPC  **If CONTRAINDICATION to enoxaparin or SQ heparin, USE IPCs**

## 2020-09-24 NOTE — DIETITIAN INITIAL EVALUATION ADULT. - OTHER INFO
Pt intubated/sedated at time of visit. Dx acute respiratory failure with hypoxia. NPO. Pt intubated/sedated at time of visit. Dx acute respiratory failure with hypoxia. NPO. Multiple hospitalization past year. Seen most recently on 8/2/20 by previous RD. Weight at that time 222lbs. Consistent Carbohydrate, Dash/TLC rx during past admission - diet education provided. Weight stable. Hx DM, HgbA1c 6.2%. Current hyperglycemia in setting of steroid use. Noted stage II and suspected DTI to coccyx.

## 2020-09-24 NOTE — H&P ADULT - PROBLEM SELECTOR PLAN 5
Chronic  ED-glucose: 195  -home on Metformin 1000 BID  - Insulin sliding scale for diabetes management, if consistently above 200 glucose add lantus Chronic  -hold home po meds   - Insulin sliding scale for diabetes management  - hypoglycemia protocol, am A1c  - pt normally adds novolog 5u daily when on high dose steroids

## 2020-09-24 NOTE — H&P ADULT - ASSESSMENT
79 yo M with PMH of COPD (On home O2 3L and BIPAP at while sleeping regardless of time of day), asthma, CAD (w/ 3v CABG 2004), s/p 2 recent coronary stents at [place], PVD s/p LLE stent (11/2019), HLD, DM2 (on Metformin), BPH, hx Hypercapnic Respiratory Failure/Cardiac Arrest requiring intubation (6/18), with multiple frequent admissions, last in 8/2020 for acute hypercapnic respiratory failure, COPD exacerbation presents with acute hypercapnic respiratory failure, intubated in the ED.

## 2020-09-24 NOTE — PROGRESS NOTE ADULT - ASSESSMENT
81 yo M with PMH of COPD (On home O2 3L and BIPAP at while sleeping regardless of time of day), asthma, CAD (w/ 3v CABG 2004), s/p 2 recent coronary stents at [place], PVD s/p LLE stent (11/2019), HLD, DM2 (on Metformin), BPH, hx Hypercapnic Respiratory Failure/Cardiac Arrest requiring intubation (6/18), with multiple frequent admissions, last in 8/2020 for acute hypercapnic respiratory failure, COPD exacerbation presents with acute hypercapnic respiratory failure, intubated in the ED.

## 2020-09-24 NOTE — ED PROCEDURE NOTE - CPROC ED INFORMED CONSENT1
wife/Benefits, risks, and possible complications of procedure explained to patient/caregiver who verbalized understanding and gave verbal consent.

## 2020-09-24 NOTE — PROGRESS NOTE ADULT - SUBJECTIVE AND OBJECTIVE BOX
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 Wife is at bedside, pt is intubated in the ED. Wife reports that pt was in his normal state of health prior to yesterday evening. He went to sleep and put on his bipap. Overnight and into the early morning pt had shortness of breath. He took off the bipap and put on 4L O2. This did not provide any relief so he switched back to the bipap. This continued all night and into the afternoon. Pts only complaint was shortness of breath. Denied any fever, chills, wheezing, increased cough or sputum production. Pt had no known sick contacts. In the evening wife took pts blood pressure and noted it was 220/>100. Due to respiratory distress and elevated blood pressure she called EMS.     In the ED pt was unable to speak and was in severe respiratory distress so he was intubated. CBC, CMP, coags, lactate, probnp, and procalcitonin were obtained. Significant for leukocytosis 14.65, co233, glucose 211, lactate and troponin wnl. ABG 7.09/>103/155. UA moderate blood. Pt was started on vanc, zosyn. given duoneb x 1, solumedrol 125mg x 1. CXR, EKG pending. (24 Sep 2020 00:15)      SUBJECTIVE & OBJECTIVE: Pt seen and examined at bedside, respiratory failure, intuabted on vent     PHYSICAL EXAM:  T(C): 36.8 (20 @ 08:11), Max: 37 (20 @ 05:33)  HR: 110 (20 @ 10:00) (108 - 135)  BP: 101/55 (20 @ 10:00) (91/50 - 200/96)  RR: 21 (20 @ 10:00) (14 - 29)  SpO2: 100% (20 @ 10:00) (97% - 100%)  Wt(kg): -- Height (cm): 180.3 ( @ 00:50)  Weight (kg): 100.2 ( @ 00:50)  BMI (kg/m2): 30.8 ( @ 00:50)  BSA (m2): 2.2 ( @ 00:50)  GENERAL: respiratory failure intuabted on vent   NERVOUS SYSTEM:  Alert   CHEST/LUNG: cta  HEART: Regular rate and rhythm; No murmurs, rubs, or gallops  ABDOMEN: Soft, Nontender, Nondistended; Bowel sounds present  EXTREMITIES:  2+ Peripheral Pulses, No clubbing, cyanosis, or edema        MEDICATIONS  (STANDING):  ALBUTerol    90 MICROgram(s) HFA Inhaler 2 Puff(s) Inhalation every 6 hours  azithromycin  IVPB      cefTRIAXone   IVPB      cefTRIAXone   IVPB 1000 milliGRAM(s) IV Intermittent once  chlorhexidine 0.12% Liquid 15 milliLiter(s) Oral Mucosa every 12 hours  chlorhexidine 2% Cloths 1 Application(s) Topical <User Schedule>  dextrose 5%. 1000 milliLiter(s) (50 mL/Hr) IV Continuous <Continuous>  dextrose 50% Injectable 12.5 Gram(s) IV Push once  enoxaparin Injectable 40 milliGRAM(s) SubCutaneous daily  insulin glargine Injectable (LANTUS) 10 Unit(s) SubCutaneous once  insulin lispro (HumaLOG) corrective regimen sliding scale   SubCutaneous every 4 hours  ipratropium 17 MICROgram(s) HFA Inhaler 1 Puff(s) Inhalation every 6 hours  lactated ringers Bolus 1000 milliLiter(s) IV Bolus once  methylPREDNISolone sodium succinate Injectable 40 milliGRAM(s) IV Push every 8 hours  metoprolol tartrate Injectable 2.5 milliGRAM(s) IV Push every 6 hours  pantoprazole  Injectable 40 milliGRAM(s) IV Push daily  propofol Infusion 4.99 MICROgram(s)/kG/Min (3 mL/Hr) IV Continuous <Continuous>    MEDICATIONS  (PRN):  dextrose 40% Gel 15 Gram(s) Oral once PRN Blood Glucose LESS THAN 70 milliGRAM(s)/deciliter  glucagon  Injectable 1 milliGRAM(s) IntraMuscular once PRN Glucose LESS THAN 70 milligrams/deciliter      LABS:                        13.3   15.53 )-----------( 259      ( 24 Sep 2020 05:06 )             45.3         139  |  100  |  20  ----------------------------<  360<H>  5.3   |  27  |  1.80<H>    Ca    9.7      24 Sep 2020 05:06  Phos  4.6       Mg     1.8         TPro  8.0  /  Alb  4.0  /  TBili  0.6  /  DBili  x   /  AST  25  /  ALT  25  /  AlkPhos  117      PT/INR - ( 23 Sep 2020 22:04 )   PT: 11.5 sec;   INR: 0.98 ratio         PTT - ( 23 Sep 2020 22:04 )  PTT:33.0 sec  Urinalysis Basic - ( 23 Sep 2020 22:22 )    Color: Yellow / Appearance: Slightly Turbid / S.025 / pH: x  Gluc: x / Ketone: Negative  / Bili: Negative / Urobili: Negative   Blood: x / Protein: 500 mg/dL / Nitrite: Negative   Leuk Esterase: Negative / RBC: 3-5 /HPF / WBC 0-2   Sq Epi: x / Non Sq Epi: Occasional / Bacteria: Occasional      Magnesium, Serum: 1.8 mg/dL ( @ 05:06)    CAPILLARY BLOOD GLUCOSE      POCT Blood Glucose.: 351 mg/dL (24 Sep 2020 06:04)  POCT Blood Glucose.: 305 mg/dL (24 Sep 2020 01:53)  POCT Blood Glucose.: 209 mg/dL (23 Sep 2020 21:45)      CAPILLARY BLOOD GLUCOSE      POCT Blood Glucose.: 351 mg/dL (24 Sep 2020 06:04)  POCT Blood Glucose.: 305 mg/dL (24 Sep 2020 01:53)  POCT Blood Glucose.: 209 mg/dL (23 Sep 2020 21:45)    CAPILLARY BLOOD GLUCOSE      POCT Blood Glucose.: 351 mg/dL (24 Sep 2020 06:04)    ABG - ( 24 Sep 2020 07:49 )  pH, Arterial: 7.23  pH, Blood: x     /  pCO2: 56    /  pO2: 95    / HCO3: 20    / Base Excess: -3.5  /  SaO2: 97                      RECENT CULTURES:      RADIOLOGY & ADDITIONAL TESTS:                        DVT/GI ppx  Discussed with pt @ bedside

## 2020-09-24 NOTE — H&P ADULT - PROBLEM SELECTOR PLAN 6
IMPROVE VTE Individual Risk Assessment: will place on heparin           RISK                                                          Points  [  ] Previous VTE                                                3  [  ] Thrombophilia                                             2  [  ] Lower limb paralysis                                   2        (unable to hold up >15 seconds)    [  ] Current Cancer                                             2         (within 6 months)  [  ] Immobilization > 24 hrs                              1  [ x ] ICU/CCU stay > 24 hours                             1  [ x ] Age > 60                                                         1    IMPROVE VTE Score:         [    2     ]    Total Risk Factor Score:    0 - 1:   Consider IPC  >2 - 3:  Thromboprophylaxis required (enoxaparin or SQ heparin)        >4:   High Risk: Thromboprophylaxis required (enoxaparin or SQ heparin), optional add IPC  **If CONTRAINDICATION to enoxaparin or SQ heparin, USE IPCs** IMPROVE VTE Individual Risk Assessment: prophylactic measures per icu          RISK                                                          Points  [  ] Previous VTE                                                3  [  ] Thrombophilia                                             2  [  ] Lower limb paralysis                                   2        (unable to hold up >15 seconds)    [  ] Current Cancer                                             2         (within 6 months)  [  ] Immobilization > 24 hrs                              1  [ x ] ICU/CCU stay > 24 hours                             1  [ x ] Age > 60                                                         1    IMPROVE VTE Score:         [    2     ]    Total Risk Factor Score:    0 - 1:   Consider IPC  >2 - 3:  Thromboprophylaxis required (enoxaparin or SQ heparin)        >4:   High Risk: Thromboprophylaxis required (enoxaparin or SQ heparin), optional add IPC  **If CONTRAINDICATION to enoxaparin or SQ heparin, USE IPCs**

## 2020-09-24 NOTE — PROGRESS NOTE ADULT - SUBJECTIVE AND OBJECTIVE BOX
Patient is a 80y old  Male who presents with a chief complaint of respiratory failure (24 Sep 2020 02:42)    24 hour events: ***    REVIEW OF SYSTEMS  Constitutional: No fever, chills, fatigue  Neuro: No headache, numbness, weakness  Resp: No cough, wheezing, shortness of breath  CVS: No chest pain, palpitations, leg swelling  GI: No abdominal pain, nausea, vomiting, diarrhea   : No dysuria, frequency, incontinence  Skin: No itching, burning, rashes, or lesions   Msk: No joint pain or swelling  Psych: No depression, anxiety, mood swings  Heme: No bleeding    T(F): 98.6 (20 @ 05:33), Max: 98.6 (20 @ 05:33)  HR: 111 (20 @ 07:14) (111 - 135)  BP: 95/53 (20 @ 07:00) (94/53 - 200/96)  RR: 16 (20 @ 07:00) (14 - 29)  SpO2: 100% (20 @ 07:14) (97% - 100%)  Wt(kg): --    Mode: AC/ CMV (Assist Control/ Continuous Mandatory Ventilation), RR (machine): 18, TV (machine): 500, FiO2: 30, PEEP: 5        I&O's Summary     @ 07:01  -   @ 07:00  --------------------------------------------------------  IN: 727 mL / OUT: 950 mL / NET: -223 mL      PHYSICAL EXAM  General:   CNS:   HEENT:   Resp:   CVS:   Abd:   Ext:   Skin:     MEDICATIONS  azithromycin  IVPB     metoprolol tartrate Injectable IV Push    dextrose 40% Gel Oral PRN  dextrose 50% Injectable IV Push  glucagon  Injectable IntraMuscular PRN  insulin lispro (HumaLOG) corrective regimen sliding scale SubCutaneous  methylPREDNISolone sodium succinate Injectable IV Push    ALBUTerol    90 MICROgram(s) HFA Inhaler Inhalation  ipratropium 17 MICROgram(s) HFA Inhaler Inhalation    fentaNYL    Injectable IV Push PRN  propofol Infusion IV Continuous      enoxaparin Injectable SubCutaneous    pantoprazole  Injectable IV Push      dextrose 5%. IV Continuous      chlorhexidine 0.12% Liquid Oral Mucosa  chlorhexidine 2% Cloths Topical                            13.3   15.53 )-----------( 259      ( 24 Sep 2020 05:06 )             45.3     Bands 9.0    -24    139  |  100  |  20  ----------------------------<  360<H>  5.3   |  27  |  1.80<H>    Ca    9.7      24 Sep 2020 05:06  Phos  4.6     -24  Mg     1.8         TPro  8.0  /  Alb  4.0  /  TBili  0.6  /  DBili  x   /  AST  25  /  ALT  25  /  AlkPhos  117      Lactate 1.4            @ 22:04          PT/INR - ( 23 Sep 2020 22:04 )   PT: 11.5 sec;   INR: 0.98 ratio         PTT - ( 23 Sep 2020 22:04 )  PTT:33.0 sec  Urinalysis Basic - ( 23 Sep 2020 22:22 )    Color: Yellow / Appearance: Slightly Turbid / S.025 / pH: x  Gluc: x / Ketone: Negative  / Bili: Negative / Urobili: Negative   Blood: x / Protein: 500 mg/dL / Nitrite: Negative   Leuk Esterase: Negative / RBC: 3-5 /HPF / WBC 0-2   Sq Epi: x / Non Sq Epi: Occasional / Bacteria: Occasional            Radiology: ***  Bedside lung ultrasound: ***  Bedside ECHO: ***    CENTRAL LINE: Y/N          DATE INSERTED:              REMOVE: Y/N  SHARMA: Y/N                        DATE INSERTED:              REMOVE: Y/N  A-LINE: Y/N                       DATE INSERTED:              REMOVE: Y/N    GLOBAL ISSUE/BEST PRACTICE  Analgesia:   Sedation:   CAM-ICU:   HOB elevation: yes  Stress ulcer prophylaxis:   VTE prophylaxis:   Glycemic control:   Nutrition:     CODE STATUS: ***  [facility] [place] Y       Patient is a 80y old  Male who presents with a chief complaint of respiratory failure (24 Sep 2020 02:42)    24 hour events: Pt seen and examined at bedside. No overnight events. He is intubated and sedated.    REVIEW OF SYSTEMS  Liminted 2/2 ET tube and sedation.    T(F): 98.6 (20 @ 05:33), Max: 98.6 (20 @ 05:33)  HR: 111 (20 @ 07:14) (111 - 135)  BP: 95/53 (20 @ 07:00) (94/53 - 200/96)  RR: 16 (20 @ 07:00) (14 - 29)  SpO2: 100% (20 @ 07:14) (97% - 100%)  Wt(kg): --    Mode: AC/ CMV (Assist Control/ Continuous Mandatory Ventilation), RR (machine): 18, TV (machine): 500, FiO2: 30, PEEP: 5        I&O's Summary     @ 07:01  -   @ 07:00  --------------------------------------------------------  IN: 727 mL / OUT: 950 mL / NET: -223 mL      PHYSICAL EXAM  General: elderly male, intubated and sedated  HEENT: NCAT. Pupils equal, reactive to light. Symmetric.  PULM: poor air movement, CTA b/l   CVS: tachycardic with regular rhythm no MRG  ABD: Soft, nondistended, nontender, normoactive bowel sounds, no masses  EXT: 1+ pitting edema b/l LE  SKIN: Warm and well perfused, no rashes noted.  NEURO: intubated and sedated    MEDICATIONS  azithromycin  IVPB     metoprolol tartrate Injectable IV Push    dextrose 40% Gel Oral PRN  dextrose 50% Injectable IV Push  glucagon  Injectable IntraMuscular PRN  insulin lispro (HumaLOG) corrective regimen sliding scale SubCutaneous  methylPREDNISolone sodium succinate Injectable IV Push    ALBUTerol    90 MICROgram(s) HFA Inhaler Inhalation  ipratropium 17 MICROgram(s) HFA Inhaler Inhalation    fentaNYL    Injectable IV Push PRN  propofol Infusion IV Continuous      enoxaparin Injectable SubCutaneous    pantoprazole  Injectable IV Push      dextrose 5%. IV Continuous      chlorhexidine 0.12% Liquid Oral Mucosa  chlorhexidine 2% Cloths Topical                            13.3   15.53 )-----------( 259      ( 24 Sep 2020 05:06 )             45.3     Bands 9.0    -    139  |  100  |  20  ----------------------------<  360<H>  5.3   |  27  |  1.80<H>    Ca    9.7      24 Sep 2020 05:06  Phos  4.6     -24  Mg     1.8         TPro  8.0  /  Alb  4.0  /  TBili  0.6  /  DBili  x   /  AST  25  /  ALT  25  /  AlkPhos  117  -    Lactate 1.4           23 @ 22:04          PT/INR - ( 23 Sep 2020 22:04 )   PT: 11.5 sec;   INR: 0.98 ratio         PTT - ( 23 Sep 2020 22:04 )  PTT:33.0 sec  Urinalysis Basic - ( 23 Sep 2020 22:22 )    Color: Yellow / Appearance: Slightly Turbid / S.025 / pH: x  Gluc: x / Ketone: Negative  / Bili: Negative / Urobili: Negative   Blood: x / Protein: 500 mg/dL / Nitrite: Negative   Leuk Esterase: Negative / RBC: 3-5 /HPF / WBC 0-2   Sq Epi: x / Non Sq Epi: Occasional / Bacteria: Occasional            Radiology: ***  Bedside lung ultrasound: diffuse A lines. B lines L lower lobe.  Bedside ECHO: adequate VTI. IVC 1.9-1.1    CENTRAL LINE: Y/N          DATE INSERTED:              REMOVE: Y/N  SHARMA: Y/N                        DATE INSERTED:              REMOVE: Y/N  A-LINE: Y/N                       DATE INSERTED:              REMOVE: Y/N    GLOBAL ISSUE/BEST PRACTICE  Analgesia: Y  Sedation: Y  HOB elevation: yes  Stress ulcer prophylaxis: Y   VTE prophylaxis: Y  Glycemic control: Y  Nutrition: N    CODE STATUS:  [facility] discussion:

## 2020-09-24 NOTE — CONSULT NOTE ADULT - SUBJECTIVE AND OBJECTIVE BOX
Patient is a 80y old  Male who presents with a chief complaint of respiratory failure (24 Sep 2020 00:15)      BRIEF HOSPITAL COURSE: 79 yo M with PMH of COPD (On home O2 3L and BIPAP at while sleeping regardless of time of day), asthma, CAD (w/ 3v CABG ), s/p 2 recent coronary stents at [place], PVD s/p LLE stent (2019), HLD, DM2 (on Metformin and insulin while on steroid tapers), BPH, hx Hypercapnic Respiratory Failure/Cardiac Arrest requiring intubation (), with multiple frequent admissions, last in 2020 Respiratory failure. Presents to the ER this time for respiratory distress and elevated BP. Was acutely dyspneic in the ER so was intubated. Treated for COPD exac.     Events last 24 hours: Patient seen and examined at the bedside. He is intubated and sedated. Peak pressures 38, I to E time 1-2, sinus tach to 130, /80, on 40% FiO2 with Spo2 99%.     PAST MEDICAL & SURGICAL HISTORY:  PVD (peripheral vascular disease)    Hypertension    COPD (chronic obstructive pulmonary disease)  on 2L at home and BiPAP at night; intubated     Osteomyelitis    Dyslipidemia    CAD (Coronary Artery Disease)  s/p 3v CABG ; stents placed in [place] in     Diabetes Mellitus, Type II    S/P primary angioplasty with coronary stent    Compound fracture  left leg    CABG (Coronary Artery Bypass Graft)          Review of Systems:  unable to obtain due to patient's clinical condition     Medications:  azithromycin  IVPB        metoprolol tartrate Injectable 2.5 milliGRAM(s) IV Push every 6 hours    ALBUTerol    90 MICROgram(s) HFA Inhaler 2 Puff(s) Inhalation every 6 hours  ipratropium 17 MICROgram(s) HFA Inhaler 1 Puff(s) Inhalation every 6 hours    fentaNYL    Injectable 50 MICROGram(s) IV Push every 2 hours PRN  propofol Infusion 5 MICROgram(s)/kG/Min IV Continuous <Continuous>      enoxaparin Injectable 40 milliGRAM(s) SubCutaneous daily    pantoprazole  Injectable 40 milliGRAM(s) IV Push daily      dextrose 40% Gel 15 Gram(s) Oral once PRN  dextrose 50% Injectable 12.5 Gram(s) IV Push once  glucagon  Injectable 1 milliGRAM(s) IntraMuscular once PRN  insulin lispro (HumaLOG) corrective regimen sliding scale   SubCutaneous every 6 hours  methylPREDNISolone sodium succinate Injectable 40 milliGRAM(s) IV Push every 8 hours    dextrose 5%. 1000 milliLiter(s) IV Continuous <Continuous>      chlorhexidine 0.12% Liquid 15 milliLiter(s) Oral Mucosa every 12 hours  chlorhexidine 2% Cloths 1 Application(s) Topical <User Schedule>        Mode: AC/ CMV (Assist Control/ Continuous Mandatory Ventilation)  RR (machine): 18  TV (machine): 500  FiO2: 30  PEEP: 5  ITime: 1  MAP: 14  PIP: 38      ICU Vital Signs Last 24 Hrs  T(C): 35.6 (23 Sep 2020 21:44), Max: 35.6 (23 Sep 2020 21:44)  T(F): 96 (23 Sep 2020 21:44), Max: 96 (23 Sep 2020 21:44)  HR: 115 (24 Sep 2020 03:15) (112 - 135)  BP: 136/72 (24 Sep 2020 03:15) (125/65 - 200/96)  BP(mean): 94 (24 Sep 2020 03:15) (89 - 127)  RR: 19 (24 Sep 2020 03:15) (14 - 29)  SpO2: 98% (24 Sep 2020 03:15) (97% - 100%)      ABG - ( 24 Sep 2020 02:48 )  pH, Arterial: 7.14  pH, Blood: x     /  pCO2: 76    /  pO2: 84    / HCO3: 20    / Base Excess: -3.0  /  SaO2: 94          I&O's Detail        LABS:                        13.7   14.65 )-----------( 300      ( 23 Sep 2020 22:04 )             44.4     -23    142  |  104  |  14  ----------------------------<  211<H>  5.2   |  33<H>  |  1.20    Ca    10.0      23 Sep 2020 22:04    TPro  8.2  /  Alb  4.0  /  TBili  0.3  /  DBili  x   /  AST  23  /  ALT  23  /  AlkPhos  113  09-23          CAPILLARY BLOOD GLUCOSE      POCT Blood Glucose.: 305 mg/dL (24 Sep 2020 01:53)    PT/INR - ( 23 Sep 2020 22:04 )   PT: 11.5 sec;   INR: 0.98 ratio         PTT - ( 23 Sep 2020 22:04 )  PTT:33.0 sec  Urinalysis Basic - ( 23 Sep 2020 22:22 )    Color: Yellow / Appearance: Slightly Turbid / S.025 / pH: x  Gluc: x / Ketone: Negative  / Bili: Negative / Urobili: Negative   Blood: x / Protein: 500 mg/dL / Nitrite: Negative   Leuk Esterase: Negative / RBC: 3-5 /HPF / WBC 0-2   Sq Epi: x / Non Sq Epi: Occasional / Bacteria: Occasional      CULTURES:      Physical Examination:    General: elderly male, intubated and sedated    HEENT: Pupils equal, reactive to light.  Symmetric.    PULM: poor air movement, diffuse wheeze b/l     CVS: tachycardic with regular rhythm no MRG    ABD: Soft, nondistended, nontender, normoactive bowel sounds, no masses    EXT: 1+ pitting edema b/l LE    SKIN: Warm and well perfused, no rashes noted.    NEURO: intubated and sedated       RADIOLOGY: CXR hyperinflated lungs, no clear consolidation       CRITICAL CARE TIME SPENT: 73 minutes assessing presenting problems of acute illness, which pose high probability of life threatening deterioration or end organ damage/dysfunction, as well as medical decision making including initiating plan of care, reviewing data, reviewing radiologic exams, discussing with multidisciplinary team,  discussing goals of care with patient/family, and writing this note.  Non-inclusive of procedures performed,

## 2020-09-24 NOTE — DIETITIAN INITIAL EVALUATION ADULT. - PROBLEM SELECTOR PLAN 5
Chronic  -hold home po meds   - Insulin sliding scale for diabetes management  - hypoglycemia protocol, am A1c  - pt normally adds novolog 5u daily when on high dose steroids

## 2020-09-24 NOTE — PROVIDER CONTACT NOTE (EICU) - RECOMMENDATIONS
Post intubation abg noted 7.14/76/84/20, increase RR   azithro/ceftriaxone empirically but procaclitonin -ve,   albuterol/atrovent nebs q6   monitor glucose on RISS  Covid -ve  resp and blood cx

## 2020-09-24 NOTE — H&P ADULT - ATTENDING COMMENTS
**of note unable to confirm all of patients home medications - per wife she believes he is on one additional medication- she will bring medication list in the morning ***

## 2020-09-24 NOTE — H&P ADULT - NSHPLABSRESULTS_GEN_ALL_CORE
13.7   14.65 )-----------( 300      ( 23 Sep 2020 22:04 )             44.4     23 Sep 2020 22:04    142    |  104    |  14     ----------------------------<  211    5.2     |  33     |  1.20     Ca    10.0       23 Sep 2020 22:04    TPro  8.2    /  Alb  4.0    /  TBili  0.3    /  DBili  x      /  AST  23     /  ALT  23     /  AlkPhos  113    23 Sep 2020 22:04    LIVER FUNCTIONS - ( 23 Sep 2020 22:04 )  Alb: 4.0 g/dL / Pro: 8.2 g/dL / ALK PHOS: 113 U/L / ALT: 23 U/L / AST: 23 U/L / GGT: x           PT/INR - ( 23 Sep 2020 22:04 )   PT: 11.5 sec;   INR: 0.98 ratio         PTT - ( 23 Sep 2020 22:04 )  PTT:33.0 sec  CAPILLARY BLOOD GLUCOSE      POCT Blood Glucose.: 209 mg/dL (23 Sep 2020 21:45)        Urinalysis Basic - ( 23 Sep 2020 22:22 )    Color: Yellow / Appearance: Slightly Turbid / S.025 / pH: x  Gluc: x / Ketone: Negative  / Bili: Negative / Urobili: Negative   Blood: x / Protein: 500 mg/dL / Nitrite: Negative   Leuk Esterase: Negative / RBC: 3-5 /HPF / WBC 0-2   Sq Epi: x / Non Sq Epi: Occasional / Bacteria: Occasional

## 2020-09-24 NOTE — PROVIDER CONTACT NOTE (CRITICAL VALUE NOTIFICATION) - ACTION/TREATMENT ORDERED:
No action at this time. Continue to trend.
increased rate to 22 bpm
No further vent changes at this time

## 2020-09-24 NOTE — H&P ADULT - PROBLEM SELECTOR PLAN 2
- acute on chronic   - bp elevated in ED >200 systolic. multifactorial- component of respiratory distress. now intubated and sedated. trend bp.   - on home lopressor 12.5mg BID  -monitor routine hemodynamics

## 2020-09-24 NOTE — PATIENT PROFILE ADULT - NSPROPOAPRESSUREINJURYCT_GEN_A_NUR
Anesthesia Post Evaluation    Patient: Cheyenne DORADO NealRonaldo    Procedure(s) Performed: Procedure(s) (LRB):  MASTECTOMY, PARTIAL with SEED RIGHT (consent AM of) 1.5 hr case (Right)  BIOPSY, LYMPH NODE, SENTINEL RIGHT (Right)    Final Anesthesia Type: general  Patient location during evaluation: PACU  Patient participation: Yes- Able to Participate  Level of consciousness: awake and alert and oriented  Post-procedure vital signs: reviewed and stable  Pain management: adequate  Airway patency: patent  PONV status at discharge: No PONV  Anesthetic complications: no      Cardiovascular status: blood pressure returned to baseline and hemodynamically stable  Respiratory status: unassisted and spontaneous ventilation  Hydration status: euvolemic  Follow-up not needed.        Visit Vitals  /80   Pulse 64   Temp 36.6 °C (97.8 °F) (Temporal)   Resp 18   SpO2 98%       Pain/Milton Score: Pain Assessment Performed: Yes (6/8/2018  1:00 PM)  Presence of Pain: complains of pain/discomfort (6/8/2018  1:00 PM)  Pain Rating Prior to Med Admin: 5 (6/8/2018 12:28 PM)  Milton Score: 10 (6/8/2018 12:49 PM)      
1

## 2020-09-24 NOTE — PROGRESS NOTE ADULT - ASSESSMENT
81 yo M with PMH of COPD (On home O2 3L and BIPAP at while sleeping regardless of time of day), asthma, CAD (w/ 3v CABG 2004), s/p 2 recent coronary stents at [place], PVD s/p LLE stent (11/2019), HLD, DM2 (on Metformin and insulin while on steroid tapers), BPH, hx Hypercapnic Respiratory Failure/Cardiac Arrest requiring intubation (6/18), with multiple frequent admissions, last in 8/2020 Respiratory failure. Presents to the ER this time for respiratory distress and elevated BP. Was acutely dyspneic in the ER so was intubated. Treated for COPD exac. ADmit to ICU    Plan  NEURO: sedate with propofol and use fentanyl PRN for pain or discomfort. Aim to keep adequately sedated to avoid tachypnea and breath stacking given severe COPD and asthma    CV: Hypertensive at this time, dose lasix 40mg IVP x1 as he appears volume overloaded on exam. Start lopressor 2.5mg q6 hours as he is on lopressor at home and given sinus tach may be in beta blocker withdrawal. Treat pain with fentanyl. Avoid autoPEEP. If remains in sinus tach may need to obtain CTA to r/o PE however less likely given BNP of 200, trop 0.05, wheeze on exam. Check echo     PULM: hypercapnic failure, known well to be for frequent COPD and asthma exacerbations, now intubated. Aim for prolonged I to E time to allow for adequate exhalation, decrease RR to 18, increase TV to 500, increase inspiratory flow rate to 70, I to E time now 1 to 3.3. Decrease Fio2 to 30%. Check ABG, allow for permissive hypercapnia, will not correct CO2 to normal, Ph 7.15 and above OK for now while he recovers from acute exacerbation. Steroids and bronchodilators RTC. Start azithromycin     GI: NPO for now    RENAL: no evidence for MERRILL, making urine, dose lasix, check labs in AM     ID: treat for suspected PNA with ceftriaxone and azithromycin     HEME: dvt-P with lovenox    ENDO: insulin sliding scale, keep BS <180    DISPO: ICU critically ill, would benefit from 24-48 hours of intubation to rest his diaphragm 81 yo M with PMH of COPD (On home O2 3L and BIPAP at while sleeping regardless of time of day), asthma, CAD (w/ 3v CABG 2004), s/p 2 recent coronary stents at [place], PVD s/p LLE stent (11/2019), HLD, DM2 (on Metformin and insulin while on steroid tapers), BPH, hx Hypercapnic Respiratory Failure/Cardiac Arrest requiring intubation (6/18), with multiple frequent admissions, last in 8/2020 Respiratory failure. Presents to the ER this time for respiratory distress and elevated BP. Was acutely dyspneic in the ER so was intubated. Treated for COPD exac. Admit to ICU    Plan  NEURO: sedated with propofol. Aim to keep adequately sedated to avoid tachypnea and breath stacking given severe COPD and asthma. dc fentanyl. Pt not on chronic pain medication    CV: No longer hypertensive at this time. sinus tach. pt  received lasix 40mg IVP x1 this am as he appears volume overloaded on exam. c/w lopressor 2.5mg q6 hours. sinus tach ?2/2 beta blocker withdrawal. Doubt PE given BNP of 200, trop 0.05. f/u echo.    PULM: Pt w/ frequent visits for COPD and asthma exacerbations. now intubated after presenting w/ hypercapnic respiratory failure. Pt is likely a baseline retainer of CO2. f/u ABG at noon: ph 7.31, pco2 56, po2 91,bicarb 25, arterial o2 sat 97. Will allow for permissive hypercapnia, will not correct CO2 to normal, Ph 7.15 and above OK for now while he recovers from acute exacerbation. c/w Steroids and bronchodilators RTC.    GI: NPO for now. c/w ppi GI ppx.    RENAL: MERRILL Pt Cr up to 1.8 this am s/p Lasix 40mg IV x 1. currently w/ adequate UOP via c[place]. diurese PRN.    ID: c/w tx for suspected PNA with ceftriaxone and azithromycin. f/u blood, urine and sputum cx.    HEME: c/w Lovenox for DVT ppx    ENDO: POCT was 351 this am. s/p lantus 10u this am. Moderate insulin sliding scale increased to q4. POCT increased to q4. will keep BS <180 and adjust scale PRN.    DISPO: currently intubated, sedated. Continue ICU monitoring. Possible wean off ventilator tomorrow

## 2020-09-24 NOTE — PROGRESS NOTE ADULT - SUBJECTIVE AND OBJECTIVE BOX
YUE COTTO    [facility] ICU1 11    Patient is a 80y old  Male who presents with a chief complaint of Hypercapnic and hypoxic respiratory failure (24 Sep 2020 07:30)       Allergies    No Known Allergies    Intolerances    shellfish (Nausea)      HPI:  81 yo M with PMH of COPD (On home O2 3L and BIPAP at while sleeping regardless of time of day), asthma, CAD (w/ 3v CABG ), s/p 2 recent coronary stents at [place], PVD s/p LLE stent (2019), HLD, DM2 (on Metformin and insulin while on steroid tapers), BPH, hx Hypercapnic Respiratory Failure/Cardiac Arrest requiring intubation (), with multiple frequent admissions, last in 2020 Respiratory failure. Wife is at bedside, pt is intubated in the ED. Wife reports that pt was in his normal state of health prior to yesterday evening. He went to sleep and put on his bipap. Overnight and into the early morning pt had shortness of breath. He took off the bipap and put on 4L O2. This did not provide any relief so he switched back to the bipap. This continued all night and into the afternoon. Pts only complaint was shortness of breath. Denied any fever, chills, wheezing, increased cough or sputum production. Pt had no known sick contacts. In the evening wife took pts blood pressure and noted it was 220/>100. Due to respiratory distress and elevated blood pressure she called EMS.     In the ED pt was unable to speak and was in severe respiratory distress so he was intubated. CBC, CMP, coags, lactate, probnp, and procalcitonin were obtained. Significant for leukocytosis 14.65, co233, glucose 211, lactate and troponin wnl. ABG 7.09/>103/155. UA moderate blood. Pt was started on vanc, zosyn. given duoneb x 1, solumedrol 125mg x 1. CXR, EKG pending. (24 Sep 2020 00:15)      PAST MEDICAL & SURGICAL HISTORY:  PVD (peripheral vascular disease)    Hypertension    COPD (chronic obstructive pulmonary disease)  on 2L at home and BiPAP at night; intubated     Osteomyelitis    Dyslipidemia    CAD (Coronary Artery Disease)  s/p 3v CABG ; stents placed in [place] in     Diabetes Mellitus, Type II    S/P primary angioplasty with coronary stent    Compound fracture  left leg    CABG (Coronary Artery Bypass Graft)          FAMILY HISTORY:  Family hx of lung cancer  brother,  age 82, used to smoke with pt    Family history of diabetes mellitus (Sibling)          MEDICATIONS   ALBUTerol    90 MICROgram(s) HFA Inhaler 2 Puff(s) Inhalation every 6 hours  azithromycin  IVPB      chlorhexidine 0.12% Liquid 15 milliLiter(s) Oral Mucosa every 12 hours  chlorhexidine 2% Cloths 1 Application(s) Topical <User Schedule>  dextrose 40% Gel 15 Gram(s) Oral once PRN  dextrose 5%. 1000 milliLiter(s) IV Continuous <Continuous>  dextrose 50% Injectable 12.5 Gram(s) IV Push once  enoxaparin Injectable 40 milliGRAM(s) SubCutaneous daily  fentaNYL    Injectable 50 MICROGram(s) IV Push every 2 hours PRN  glucagon  Injectable 1 milliGRAM(s) IntraMuscular once PRN  insulin lispro (HumaLOG) corrective regimen sliding scale   SubCutaneous every 6 hours  ipratropium 17 MICROgram(s) HFA Inhaler 1 Puff(s) Inhalation every 6 hours  methylPREDNISolone sodium succinate Injectable 40 milliGRAM(s) IV Push every 8 hours  metoprolol tartrate Injectable 2.5 milliGRAM(s) IV Push every 6 hours  pantoprazole  Injectable 40 milliGRAM(s) IV Push daily  propofol Infusion 4.99 MICROgram(s)/kG/Min IV Continuous <Continuous>      Vital Signs Last 24 Hrs  T(C): 36.8 (24 Sep 2020 08:11), Max: 37 (24 Sep 2020 05:33)  T(F): 98.2 (24 Sep 2020 08:11), Max: 98.6 (24 Sep 2020 05:33)  HR: 112 (24 Sep 2020 08:00) (111 - 135)  BP: 92/54 (24 Sep 2020 08:00) (92/54 - 200/96)  BP(mean): 68 (24 Sep 2020 08:00) (68 - 127)  RR: 18 (24 Sep 2020 08:00) (14 - 29)  SpO2: 99% (24 Sep 2020 08:00) (97% - 100%)      20 @ 07:01  -  20 @ 07:00  --------------------------------------------------------  IN: 727 mL / OUT: 950 mL / NET: -223 mL    20 @ 07:01  -  20 @ 08:17  --------------------------------------------------------  IN: 24 mL / OUT: 30 mL / NET: -6 mL        Mode: AC/ CMV (Assist Control/ Continuous Mandatory Ventilation), RR (machine): 18, TV (machine): 500, FiO2: 30, PEEP: 5, ITime: 1, MAP: 12, PIP: 33    LABS:                        13.3   15.53 )-----------( 259      ( 24 Sep 2020 05:06 )             45.3         139  |  100  |  20  ----------------------------<  360<H>  5.3   |  27  |  1.80<H>    Ca    9.7      24 Sep 2020 05:06  Phos  4.6       Mg     1.8         TPro  8.0  /  Alb  4.0  /  TBili  0.6  /  DBili  x   /  AST  25  /  ALT  25  /  AlkPhos  117  -24    PT/INR - ( 23 Sep 2020 22:04 )   PT: 11.5 sec;   INR: 0.98 ratio         PTT - ( 23 Sep 2020 22:04 )  PTT:33.0 sec  Urinalysis Basic - ( 23 Sep 2020 22:22 )    Color: Yellow / Appearance: Slightly Turbid / S.025 / pH: x  Gluc: x / Ketone: Negative  / Bili: Negative / Urobili: Negative   Blood: x / Protein: 500 mg/dL / Nitrite: Negative   Leuk Esterase: Negative / RBC: 3-5 /HPF / WBC 0-2   Sq Epi: x / Non Sq Epi: Occasional / Bacteria: Occasional        ABG - ( 24 Sep 2020 07:49 )  pH, Arterial: 7.23  pH, Blood: x     /  pCO2: 56    /  pO2: 95    / HCO3: 20    / Base Excess: -3.5  /  SaO2: 97                  WBC:  WBC Count: 15.53 K/uL ( @ 05:06)  WBC Count: 14.65 K/uL ( @ 22:04)      MICROBIOLOGY:  RECENT CULTURES:              PT/INR - ( 23 Sep 2020 22:04 )   PT: 11.5 sec;   INR: 0.98 ratio         PTT - ( 23 Sep 2020 22:04 )  PTT:33.0 sec    Sodium:  Sodium, Serum: 139 mmol/L ( @ 05:06)  Sodium, Serum: 142 mmol/L ( @ 22:04)      1.80 mg/dL  05:06  1.20 mg/dL  22:04      Hemoglobin:  Hemoglobin: 13.3 g/dL ( @ 05:06)  Hemoglobin: 13.7 g/dL ( @ 22:04)      Platelets: Platelet Count - Automated: 259 K/uL ( @ 05:06)  Platelet Count - Automated: 300 K/uL ( @ 22:04)      LIVER FUNCTIONS - ( 24 Sep 2020 05:06 )  Alb: 4.0 g/dL / Pro: 8.0 g/dL / ALK PHOS: 117 U/L / ALT: 25 U/L / AST: 25 U/L / GGT: x             Urinalysis Basic - ( 23 Sep 2020 22:22 )    Color: Yellow / Appearance: Slightly Turbid / S.025 / pH: x  Gluc: x / Ketone: Negative  / Bili: Negative / Urobili: Negative   Blood: x / Protein: 500 mg/dL / Nitrite: Negative   Leuk Esterase: Negative / RBC: 3-5 /HPF / WBC 0-2   Sq Epi: x / Non Sq Epi: Occasional / Bacteria: Occasional        RADIOLOGY & ADDITIONAL STUDIES:

## 2020-09-24 NOTE — ED PROCEDURE NOTE - NS ED PROCEUDURE1 POST INTUBATION REVIEW
Positive end tidal Co2 noted/Appropriate capnography/Breath sounds bilateral Breath sounds bilateral/Positive end tidal Co2 noted/Appropriate capnography/Chest X-Ray

## 2020-09-25 LAB
ALBUMIN SERPL ELPH-MCNC: 3 G/DL — LOW (ref 3.3–5)
ALP SERPL-CCNC: 81 U/L — SIGNIFICANT CHANGE UP (ref 40–120)
ALT FLD-CCNC: 18 U/L — SIGNIFICANT CHANGE UP (ref 12–78)
ANION GAP SERPL CALC-SCNC: 5 MMOL/L — SIGNIFICANT CHANGE UP (ref 5–17)
AST SERPL-CCNC: 12 U/L — LOW (ref 15–37)
BASOPHILS # BLD AUTO: 0.01 K/UL — SIGNIFICANT CHANGE UP (ref 0–0.2)
BASOPHILS NFR BLD AUTO: 0.1 % — SIGNIFICANT CHANGE UP (ref 0–2)
BILIRUB SERPL-MCNC: 0.3 MG/DL — SIGNIFICANT CHANGE UP (ref 0.2–1.2)
BUN SERPL-MCNC: 28 MG/DL — HIGH (ref 7–23)
CALCIUM SERPL-MCNC: 9.4 MG/DL — SIGNIFICANT CHANGE UP (ref 8.5–10.1)
CHLORIDE SERPL-SCNC: 103 MMOL/L — SIGNIFICANT CHANGE UP (ref 96–108)
CO2 SERPL-SCNC: 32 MMOL/L — HIGH (ref 22–31)
CREAT SERPL-MCNC: 1.5 MG/DL — HIGH (ref 0.5–1.3)
EOSINOPHIL # BLD AUTO: 0 K/UL — SIGNIFICANT CHANGE UP (ref 0–0.5)
EOSINOPHIL NFR BLD AUTO: 0 % — SIGNIFICANT CHANGE UP (ref 0–6)
GLUCOSE SERPL-MCNC: 211 MG/DL — HIGH (ref 70–99)
HCT VFR BLD CALC: 34.6 % — LOW (ref 39–50)
HGB BLD-MCNC: 10.6 G/DL — LOW (ref 13–17)
IMM GRANULOCYTES NFR BLD AUTO: 0.8 % — SIGNIFICANT CHANGE UP (ref 0–1.5)
LYMPHOCYTES # BLD AUTO: 0.59 K/UL — LOW (ref 1–3.3)
LYMPHOCYTES # BLD AUTO: 3.9 % — LOW (ref 13–44)
MAGNESIUM SERPL-MCNC: 2.4 MG/DL — SIGNIFICANT CHANGE UP (ref 1.6–2.6)
MCHC RBC-ENTMCNC: 27.4 PG — SIGNIFICANT CHANGE UP (ref 27–34)
MCHC RBC-ENTMCNC: 30.6 GM/DL — LOW (ref 32–36)
MCV RBC AUTO: 89.4 FL — SIGNIFICANT CHANGE UP (ref 80–100)
MONOCYTES # BLD AUTO: 1.16 K/UL — HIGH (ref 0–0.9)
MONOCYTES NFR BLD AUTO: 7.6 % — SIGNIFICANT CHANGE UP (ref 2–14)
NEUTROPHILS # BLD AUTO: 13.29 K/UL — HIGH (ref 1.8–7.4)
NEUTROPHILS NFR BLD AUTO: 87.6 % — HIGH (ref 43–77)
NRBC # BLD: 0 /100 WBCS — SIGNIFICANT CHANGE UP (ref 0–0)
PHOSPHATE SERPL-MCNC: 3.1 MG/DL — SIGNIFICANT CHANGE UP (ref 2.5–4.5)
PLATELET # BLD AUTO: 223 K/UL — SIGNIFICANT CHANGE UP (ref 150–400)
POTASSIUM SERPL-MCNC: 5.4 MMOL/L — HIGH (ref 3.5–5.3)
POTASSIUM SERPL-SCNC: 5.4 MMOL/L — HIGH (ref 3.5–5.3)
PROT SERPL-MCNC: 6.3 G/DL — SIGNIFICANT CHANGE UP (ref 6–8.3)
RBC # BLD: 3.87 M/UL — LOW (ref 4.2–5.8)
RBC # FLD: 14.6 % — HIGH (ref 10.3–14.5)
SODIUM SERPL-SCNC: 140 MMOL/L — SIGNIFICANT CHANGE UP (ref 135–145)
WBC # BLD: 15.17 K/UL — HIGH (ref 3.8–10.5)
WBC # FLD AUTO: 15.17 K/UL — HIGH (ref 3.8–10.5)

## 2020-09-25 PROCEDURE — 99233 SBSQ HOSP IP/OBS HIGH 50: CPT | Mod: GC

## 2020-09-25 PROCEDURE — 99232 SBSQ HOSP IP/OBS MODERATE 35: CPT

## 2020-09-25 RX ORDER — INSULIN GLARGINE 100 [IU]/ML
12 INJECTION, SOLUTION SUBCUTANEOUS EVERY MORNING
Refills: 0 | Status: DISCONTINUED | OUTPATIENT
Start: 2020-09-25 | End: 2020-09-26

## 2020-09-25 RX ADMIN — INSULIN GLARGINE 12 UNIT(S): 100 INJECTION, SOLUTION SUBCUTANEOUS at 14:17

## 2020-09-25 RX ADMIN — CEFTRIAXONE 100 MILLIGRAM(S): 500 INJECTION, POWDER, FOR SOLUTION INTRAMUSCULAR; INTRAVENOUS at 09:33

## 2020-09-25 RX ADMIN — CHLORHEXIDINE GLUCONATE 1 APPLICATION(S): 213 SOLUTION TOPICAL at 05:52

## 2020-09-25 RX ADMIN — Medication 3 MILLILITER(S): at 13:53

## 2020-09-25 RX ADMIN — PANTOPRAZOLE SODIUM 40 MILLIGRAM(S): 20 TABLET, DELAYED RELEASE ORAL at 05:52

## 2020-09-25 RX ADMIN — Medication 40 MILLIGRAM(S): at 04:41

## 2020-09-25 RX ADMIN — Medication 25 MILLIGRAM(S): at 21:09

## 2020-09-25 RX ADMIN — Medication 2: at 21:10

## 2020-09-25 RX ADMIN — Medication 6: at 12:31

## 2020-09-25 RX ADMIN — Medication 81 MILLIGRAM(S): at 12:32

## 2020-09-25 RX ADMIN — Medication 3 MILLILITER(S): at 07:09

## 2020-09-25 RX ADMIN — Medication 3 MILLILITER(S): at 00:53

## 2020-09-25 RX ADMIN — Medication 40 MILLIGRAM(S): at 14:08

## 2020-09-25 RX ADMIN — ENOXAPARIN SODIUM 40 MILLIGRAM(S): 100 INJECTION SUBCUTANEOUS at 12:32

## 2020-09-25 RX ADMIN — Medication 40 MILLIGRAM(S): at 21:09

## 2020-09-25 RX ADMIN — Medication 2: at 08:03

## 2020-09-25 RX ADMIN — CLOPIDOGREL BISULFATE 75 MILLIGRAM(S): 75 TABLET, FILM COATED ORAL at 12:32

## 2020-09-25 RX ADMIN — Medication 25 MILLIGRAM(S): at 09:33

## 2020-09-25 RX ADMIN — Medication 3 MILLILITER(S): at 19:58

## 2020-09-25 RX ADMIN — AZITHROMYCIN 255 MILLIGRAM(S): 500 TABLET, FILM COATED ORAL at 01:18

## 2020-09-25 NOTE — PROGRESS NOTE ADULT - SUBJECTIVE AND OBJECTIVE BOX
YUE COTTO    [facility] ICU1 11    Patient is a 80y old  Male who presents with a chief complaint of respiratory failure (25 Sep 2020 08:25)       Allergies    No Known Allergies    Intolerances    shellfish (Nausea)      HPI:  79 yo M with PMH of COPD (On home O2 3L and BIPAP at while sleeping regardless of time of day), asthma, CAD (w/ 3v CABG ), s/p 2 recent coronary stents at [place], PVD s/p LLE stent (2019), HLD, DM2 (on Metformin and insulin while on steroid tapers), BPH, hx Hypercapnic Respiratory Failure/Cardiac Arrest requiring intubation (), with multiple frequent admissions, last in 2020 Respiratory failure. Wife is at bedside, pt is intubated in the ED. Wife reports that pt was in his normal state of health prior to yesterday evening. He went to sleep and put on his bipap. Overnight and into the early morning pt had shortness of breath. He took off the bipap and put on 4L O2. This did not provide any relief so he switched back to the bipap. This continued all night and into the afternoon. Pts only complaint was shortness of breath. Denied any fever, chills, wheezing, increased cough or sputum production. Pt had no known sick contacts. In the evening wife took pts blood pressure and noted it was 220/>100. Due to respiratory distress and elevated blood pressure she called EMS.     In the ED pt was unable to speak and was in severe respiratory distress so he was intubated. CBC, CMP, coags, lactate, probnp, and procalcitonin were obtained. Significant for leukocytosis 14.65, co233, glucose 211, lactate and troponin wnl. ABG 7.09/>103/155. UA moderate blood. Pt was started on vanc, zosyn. given duoneb x 1, solumedrol 125mg x 1. CXR, EKG pending. (24 Sep 2020 00:15)      PAST MEDICAL & SURGICAL HISTORY:  PVD (peripheral vascular disease)    Hypertension    COPD (chronic obstructive pulmonary disease)  on 2L at home and BiPAP at night; intubated     Osteomyelitis    Dyslipidemia    CAD (Coronary Artery Disease)  s/p 3v CABG ; stents placed in [place] in     Diabetes Mellitus, Type II    S/P primary angioplasty with coronary stent    Compound fracture  left leg    CABG (Coronary Artery Bypass Graft)          FAMILY HISTORY:  Family hx of lung cancer  brother,  age 82, used to smoke with pt    Family history of diabetes mellitus (Sibling)          MEDICATIONS   albuterol/ipratropium for Nebulization 3 milliLiter(s) Nebulizer every 6 hours  aspirin  chewable 81 milliGRAM(s) Oral daily  azithromycin  IVPB      azithromycin  IVPB 500 milliGRAM(s) IV Intermittent every 24 hours  cefTRIAXone   IVPB 1000 milliGRAM(s) IV Intermittent every 24 hours  cefTRIAXone   IVPB      chlorhexidine 2% Cloths 1 Application(s) Topical <User Schedule>  clopidogrel Tablet 75 milliGRAM(s) Oral daily  dextrose 40% Gel 15 Gram(s) Oral once PRN  dextrose 5%. 1000 milliLiter(s) IV Continuous <Continuous>  dextrose 50% Injectable 12.5 Gram(s) IV Push once  enoxaparin Injectable 40 milliGRAM(s) SubCutaneous daily  glucagon  Injectable 1 milliGRAM(s) IntraMuscular once PRN  insulin lispro (HumaLOG) corrective regimen sliding scale   SubCutaneous three times a day before meals  insulin lispro (HumaLOG) corrective regimen sliding scale   SubCutaneous at bedtime  methylPREDNISolone sodium succinate Injectable 40 milliGRAM(s) IV Push every 8 hours  metoprolol tartrate 25 milliGRAM(s) Oral every 12 hours  pantoprazole    Tablet 40 milliGRAM(s) Oral before breakfast      Vital Signs Last 24 Hrs  T(C): 36.4 (25 Sep 2020 07:42), Max: 37.7 (24 Sep 2020 15:52)  T(F): 97.6 (25 Sep 2020 07:42), Max: 99.8 (24 Sep 2020 15:52)  HR: 63 (25 Sep 2020 08:00) (59 - 110)  BP: 137/61 (25 Sep 2020 08:00) (91/50 - 143/75)  BP(mean): 88 (25 Sep 2020 08:00) (65 - 102)  RR: 18 (25 Sep 2020 08:00) (14 - 28)  SpO2: 99% (25 Sep 2020 08:00) (98% - 100%)      20 @ 07:01  -  20 @ 07:00  --------------------------------------------------------  IN: 1 mL / OUT: 2500 mL / NET: -459 mL    20 @ 07:01  -  20 @ 09:16  --------------------------------------------------------  IN: 0 mL / OUT: 190 mL / NET: -190 mL        Mode: CPAP with PS, FiO2: 30, PEEP: 5, PS: 10, ITime: 1, MAP: 8.2    LABS:                        10.6   15. )-----------( 223      ( 25 Sep 2020 05:52 )             34.6         140  |  103  |  28<H>  ----------------------------<  211<H>  5.4<H>   |  32<H>  |  1.50<H>    Ca    9.4      25 Sep 2020 05:52  Phos  3.1       Mg     2.4         TPro  6.3  /  Alb  3.0<L>  /  TBili  0.3  /  DBili  x   /  AST  12<L>  /  ALT  18  /  AlkPhos  81      PT/INR - ( 23 Sep 2020 22:04 )   PT: 11.5 sec;   INR: 0.98 ratio         PTT - ( 23 Sep 2020 22:04 )  PTT:33.0 sec  Urinalysis Basic - ( 23 Sep 2020 22:22 )    Color: Yellow / Appearance: Slightly Turbid / S.025 / pH: x  Gluc: x / Ketone: Negative  / Bili: Negative / Urobili: Negative   Blood: x / Protein: 500 mg/dL / Nitrite: Negative   Leuk Esterase: Negative / RBC: 3-5 /HPF / WBC 0-2   Sq Epi: x / Non Sq Epi: Occasional / Bacteria: Occasional        ABG - ( 24 Sep 2020 18:28 )  pH, Arterial: 7.36  pH, Blood: x     /  pCO2: 53    /  pO2: 94    / HCO3: 27    / Base Excess: 4.1   /  SaO2: 97                  WBC:  WBC Count: 15.17 K/uL ( @ 05:52)  WBC Count: 15.53 K/uL ( @ 05:06)  WBC Count: 14.65 K/uL ( @ 22:04)      MICROBIOLOGY:  RECENT CULTURES:   .Sputum Sputum XXXX   Moderate polymorphonuclear leukocytes per low power field  Rare Squamous epithelial cells per low power field  Few Gram Negative Rods per oil power field XXXX     .Urine Catheterized XXXX XXXX   No growth     .Blood Blood-Peripheral XXXX XXXX   No growth to date.                PT/INR - ( 23 Sep 2020 22:04 )   PT: 11.5 sec;   INR: 0.98 ratio         PTT - ( 23 Sep 2020 22: )  PTT:33.0 sec    Sodium:  Sodium, Serum: 140 mmol/L ( @ 05:52)  Sodium, Serum: 139 mmol/L ( @ 05:06)  Sodium, Serum: 142 mmol/L ( 22:04)      1.50 mg/dL  05:52  1.80 mg/dL  @ 05:06  1.20 mg/dL  @ 22:04      Hemoglobin:  Hemoglobin: 10.6 g/dL ( @ 05:52)  Hemoglobin: 13.3 g/dL ( @ 05:06)  Hemoglobin: 13.7 g/dL ( @ 22:04)      Platelets: Platelet Count - Automated: 223 K/uL ( @ 05:52)  Platelet Count - Automated: 259 K/uL ( @ 05:06)  Platelet Count - Automated: 300 K/uL ( @ 22:04)      LIVER FUNCTIONS - ( 25 Sep 2020 05:52 )  Alb: 3.0 g/dL / Pro: 6.3 g/dL / ALK PHOS: 81 U/L / ALT: 18 U/L / AST: 12 U/L / GGT: x             Urinalysis Basic - ( 23 Sep 2020 22:22 )    Color: Yellow / Appearance: Slightly Turbid / S.025 / pH: x  Gluc: x / Ketone: Negative  / Bili: Negative / Urobili: Negative   Blood: x / Protein: 500 mg/dL / Nitrite: Negative   Leuk Esterase: Negative / RBC: 3-5 /HPF / WBC 0-2   Sq Epi: x / Non Sq Epi: Occasional / Bacteria: Occasional        RADIOLOGY & ADDITIONAL STUDIES:

## 2020-09-25 NOTE — PROGRESS NOTE ADULT - ATTENDING COMMENTS
80M h/o COPD (On home O2 3L and BIPAP at while sleeping), eosinophilic asthma, CAD (s/p CABG 2004), s/p 2 recent coronary stents at [place], D s/p LLE stent (11/2019), HLD, DM2, BPH previous cardiac arrest a/w hypercarbic respiratory failure 2/2 COPD exacerbation, extubated yesterday    Neuro: no acute issues  CV: BP stable, changed IV lopressor to home metoprolol; continue aspirin and plavix  Pulm: acute hypercarbic resp failure 2/2 COPD exacerbation requiring intubation, extubated yesterday evening and doing well, continue home BiPAP 18/5 when sleeping, sputum culture pending - continue bronchodilators and prednisone, will ultimately need prolonged taper to chronic dose of 5mg  GI: tolerating regular diet, continue home protonix  Renal: MERRILL improving, K+ mildly elevated, will monitor; trend sCr, UOP and electrolytes  ID: continue ceftriaxone for RLL PNA; sputum and blood cultures pending  Endo: hyperglycemia in setting of steroids, increase lantus to 12units with continued moderate ISS coverage; FS premeal and qHS  Heme: dvt ppx lovenox
80M h/o COPD (On home O2 3L and BIPAP at while sleeping), eosinophilic asthma, CAD (s/p CABG 2004), s/p 2 recent coronary stents at [place], PVD s/p LLE stent (11/2019), HLD, DM2, BPH previous cardiac arrest a/w hypercarbic respiratory failure 2/2 COPD exacerbation, s/p intubation in ED    Neuro: sedated with propofol, wean sedation as able  CV: hypertensive on arrival to ED likely 2/2 stress response from severe respiratory distress, now improved, continue to monitor; continue home metoprolol, asa, plavix; IVC collapsable, 1.9 --> 1.4cm s/p lasix 40mg IVP x1 o/n - will give 1L LR bolus    Pulm: acute hypercarbic resp failure 2/2 COPD exacerbation requiring intubation, send sputum culture for possible RLL PNA on CXR and POCUS - continue bronchodilators and solu-medrol, will ultimately need prolonged taper similar to previous after admission last month  GI: NPO, if to remained intubated for > 24hrs can place NG for enteral feeds  Renal: MERRILL on admission, likely 2/2 acute illness, BP - monitor UOP, trend sCr, monitor electrolytes  ID: leukocytosis with bandemia with normal procalcitonin, CXR and POCUS concerning for possible RLL PNA, currently on ceftriazone and azithro, sputum culture sent, if negative can dc  Endo: hyperglycemia in setting of steroids, will dose lantus 10units in AM base on ISS coverage needs, if remains elevated may require insulin gtt for better glucose control  Heme: dvt ppx lovenox, change to HSQ if sCr worsens    Pt remains critically ill. Full code at current

## 2020-09-25 NOTE — PROGRESS NOTE ADULT - SUBJECTIVE AND OBJECTIVE BOX
Patient is a 80y old  Male who presents with a chief complaint of respiratory failure (25 Sep 2020 10:39)    24 hour events: Pt seen and examined at bedside. No overnight events. He is mentating well and states he feels ready for discharge. Per discussion this am, pt believes he may have malfunctioning BiPAP at home and states wife was meant to bring in machine for calibration/assessment. Denies HA, cp, palpitations, SOB, n/v.     Review of Systems  Constitutional: denies fever, chills, diaphoresis, fatigue, weakness  HEENT: denies blurry vision, difficulty hearing, sore throat  Respiratory: denies SOB, LA, cough, sputum production, wheezing, hemoptysis  Cardiovascular: denies chest pain, palpitations, edema  Gastrointestinal: denies nausea, vomiting, diarrhea, constipation, abdominal pain, melena, hematochezia   Genitourinary: denies dysuria, discharge, frequency, urgency, hematuria   Musculoskeletal: denies myalgias, muscle weakness, joint pain, joint swelling  Neurologic: denies headache, focal weakness, dizziness, paresthesias, numbness/tingling  Skin/Breast: denies rash, new lesions, itching, color change    T(F): 97.6 (20 @ 07:42), Max: 99.8 (20 @ 15:52)  HR: 95 (20 @ 09:00) (59 - 105)  BP: 130/98 (20 @ 09:00) (109/58 - 143/75)  RR: 24 (20 @ 09:00) (14 - 28)  SpO2: 97% (20 @ 09:00) (97% - 100%)  Wt(kg): --    Mode: CPAP with PS, FiO2: 30, PEEP: 5, PS: 10        I&O's Summary     @ 07:01  -   @ 07:00  --------------------------------------------------------  IN: 2041 mL / OUT: 2500 mL / NET: -459 mL     @ 07: @ 10:55  --------------------------------------------------------  IN: 390 mL / OUT: 440 mL / NET: -50 mL      PHYSICAL EXAM  General: elderly male NAD.  HEENT: NCAT. Pupils equal, reactive to light. Symmetric.  PULM: poor air movement,. b/l wheezes   CVS: NRR no MRG  ABD: Soft, nondistended, nontender, normoactive bowel sounds, no masses  EXT: trace b/l LE edema  SKIN: Warm and well perfused, no rashes noted.  NEURO: A&Ox3. non focal. CN II-XII grossly intact    MEDICATIONS  cefTRIAXone   IVPB   cefTRIAXone   IVPB IV Intermittent    metoprolol tartrate Oral    dextrose 40% Gel Oral PRN  dextrose 50% Injectable IV Push  glucagon  Injectable IntraMuscular PRN  insulin lispro (HumaLOG) corrective regimen sliding scale SubCutaneous  insulin lispro (HumaLOG) corrective regimen sliding scale SubCutaneous  predniSONE   Tablet Oral    albuterol/ipratropium for Nebulization Nebulizer        aspirin  chewable Oral  clopidogrel Tablet Oral  enoxaparin Injectable SubCutaneous        dextrose 5%. IV Continuous      chlorhexidine 2% Cloths Topical                            10.6   15.17 )-----------( 223      ( 25 Sep 2020 05:52 )             34.6           140  |  103  |  28<H>  ----------------------------<  211<H>  5.4<H>   |  32<H>  |  1.50<H>    Ca    9.4      25 Sep 2020 05:52  Phos  3.1       Mg     2.4         TPro  6.3  /  Alb  3.0<L>  /  TBili  0.3  /  DBili  x   /  AST  12<L>  /  ALT  18  /  AlkPhos  81            PT/INR - ( 23 Sep 2020 22:04 )   PT: 11.5 sec;   INR: 0.98 ratio         PTT - ( 23 Sep 2020 22:04 )  PTT:33.0 sec  Urinalysis Basic - ( 23 Sep 2020 22:22 )    Color: Yellow / Appearance: Slightly Turbid / S.025 / pH: x  Gluc: x / Ketone: Negative  / Bili: Negative / Urobili: Negative   Blood: x / Protein: 500 mg/dL / Nitrite: Negative   Leuk Esterase: Negative / RBC: 3-5 /HPF / WBC 0-2   Sq Epi: x / Non Sq Epi: Occasional / Bacteria: Occasional      .Sputum Sputum --   Moderate polymorphonuclear leukocytes per low power field  Rare Squamous epithelial cells per low power field  Few Gram Negative Rods per oil power field  @ 18:32  .Urine Catheterized   No growth --  @ 01:16  .Blood Blood-Peripheral   No growth to date. --  @ 01:11        Radiology: ***  Bedside lung ultrasound: ***  Bedside ECHO: ***    CENTRAL LINE: N          DATE INSERTED:              REMOVE: Y/N  SHARMA: N                        DATE INSERTED:              REMOVE:   A-LINE: YN                       DATE INSERTED:              REMOVE: Y/N    GLOBAL ISSUE/BEST PRACTICE  Analgesia: Y  Sedation: Y  HOB elevation: yes  Stress ulcer prophylaxis: Y   VTE prophylaxis: Y  Glycemic control: Y  Nutrition: N

## 2020-09-25 NOTE — PROGRESS NOTE ADULT - SUBJECTIVE AND OBJECTIVE BOX
Patient is a 80y old  Male who presents with a chief complaint of respiratory failure (25 Sep 2020 09:16)        HPI:  79 yo M with PMH of COPD (On home O2 3L and BIPAP at while sleeping regardless of time of day), asthma, CAD (w/ 3v CABG ), s/p 2 recent coronary stents at [place], PVD s/p LLE stent (2019), HLD, DM2 (on Metformin and insulin while on steroid tapers), BPH, hx Hypercapnic Respiratory Failure/Cardiac Arrest requiring intubation (), with multiple frequent admissions, last in 2020 Respiratory failure. Wife is at bedside, pt is intubated in the ED. Wife reports that pt was in his normal state of health prior to yesterday evening. He went to sleep and put on his bipap. Overnight and into the early morning pt had shortness of breath. He took off the bipap and put on 4L O2. This did not provide any relief so he switched back to the bipap. This continued all night and into the afternoon. Pts only complaint was shortness of breath. Denied any fever, chills, wheezing, increased cough or sputum production. Pt had no known sick contacts. In the evening wife took pts blood pressure and noted it was 220/>100. Due to respiratory distress and elevated blood pressure she called EMS.     In the ED pt was unable to speak and was in severe respiratory distress so he was intubated. CBC, CMP, coags, lactate, probnp, and procalcitonin were obtained. Significant for leukocytosis 14.65, co233, glucose 211, lactate and troponin wnl. ABG 7.09/>103/155. UA moderate blood. Pt was started on vanc, zosyn. given duoneb x 1, solumedrol 125mg x 1. CXR, EKG pending. (24 Sep 2020 00:15)      SUBJECTIVE & OBJECTIVE: Pt seen and examined at bedside. inutbated, on vent     PHYSICAL EXAM:  T(C): 36.4 (20 @ 07:42), Max: 37.7 (20 @ 15:52)  HR: 95 (20 @ 09:00) (59 - 105)  BP: 130/98 (20 @ 09:00) (109/58 - 143/75)  RR: 24 (20 @ 09:00) (14 - 28)  SpO2: 97% (20 @ 09:00) (97% - 100%)  Wt(kg): --   GENERAL: inubtaed on vent   NECK: Supple, No JVD  NERVOUS SYSTEM:  on propafolol   CHEST/LUNG:decrease air entry at the bases   HEART: Regular rate and rhythm; No murmurs, rubs, or gallops  ABDOMEN: Soft, Nontender, Nondistended; Bowel sounds present  EXTREMITIES:  2+ Peripheral Pulses, No clubbing, cyanosis, or edema        MEDICATIONS  (STANDING):  albuterol/ipratropium for Nebulization 3 milliLiter(s) Nebulizer every 6 hours  aspirin  chewable 81 milliGRAM(s) Oral daily  cefTRIAXone   IVPB      cefTRIAXone   IVPB 1000 milliGRAM(s) IV Intermittent every 24 hours  chlorhexidine 2% Cloths 1 Application(s) Topical <User Schedule>  clopidogrel Tablet 75 milliGRAM(s) Oral daily  dextrose 5%. 1000 milliLiter(s) (50 mL/Hr) IV Continuous <Continuous>  dextrose 50% Injectable 12.5 Gram(s) IV Push once  enoxaparin Injectable 40 milliGRAM(s) SubCutaneous daily  insulin lispro (HumaLOG) corrective regimen sliding scale   SubCutaneous three times a day before meals  insulin lispro (HumaLOG) corrective regimen sliding scale   SubCutaneous at bedtime  metoprolol tartrate 25 milliGRAM(s) Oral every 12 hours  predniSONE   Tablet 40 milliGRAM(s) Oral every 8 hours    MEDICATIONS  (PRN):  dextrose 40% Gel 15 Gram(s) Oral once PRN Blood Glucose LESS THAN 70 milliGRAM(s)/deciliter  glucagon  Injectable 1 milliGRAM(s) IntraMuscular once PRN Glucose LESS THAN 70 milligrams/deciliter      LABS:                        10.6   15.17 )-----------( 223      ( 25 Sep 2020 05:52 )             34.6     -    140  |  103  |  28<H>  ----------------------------<  211<H>  5.4<H>   |  32<H>  |  1.50<H>    Ca    9.4      25 Sep 2020 05:52  Phos  3.1       Mg     2.4         TPro  6.3  /  Alb  3.0<L>  /  TBili  0.3  /  DBili  x   /  AST  12<L>  /  ALT  18  /  AlkPhos  81      PT/INR - ( 23 Sep 2020 22:04 )   PT: 11.5 sec;   INR: 0.98 ratio         PTT - ( 23 Sep 2020 22:04 )  PTT:33.0 sec  Urinalysis Basic - ( 23 Sep 2020 22:22 )    Color: Yellow / Appearance: Slightly Turbid / S.025 / pH: x  Gluc: x / Ketone: Negative  / Bili: Negative / Urobili: Negative   Blood: x / Protein: 500 mg/dL / Nitrite: Negative   Leuk Esterase: Negative / RBC: 3-5 /HPF / WBC 0-2   Sq Epi: x / Non Sq Epi: Occasional / Bacteria: Occasional      Magnesium, Serum: 2.4 mg/dL ( @ 05:52)    CAPILLARY BLOOD GLUCOSE      POCT Blood Glucose.: 193 mg/dL (25 Sep 2020 07:50)  POCT Blood Glucose.: 178 mg/dL (24 Sep 2020 22:42)  POCT Blood Glucose.: 216 mg/dL (24 Sep 2020 17:40)  POCT Blood Glucose.: 286 mg/dL (24 Sep 2020 14:42)      CAPILLARY BLOOD GLUCOSE      POCT Blood Glucose.: 193 mg/dL (25 Sep 2020 07:50)  POCT Blood Glucose.: 178 mg/dL (24 Sep 2020 22:42)  POCT Blood Glucose.: 216 mg/dL (24 Sep 2020 17:40)  POCT Blood Glucose.: 286 mg/dL (24 Sep 2020 14:42)    CAPILLARY BLOOD GLUCOSE      POCT Blood Glucose.: 193 mg/dL (25 Sep 2020 07:50)    ABG - ( 24 Sep 2020 18:28 )  pH, Arterial: 7.36  pH, Blood: x     /  pCO2: 53    /  pO2: 94    / HCO3: 27    / Base Excess: 4.1   /  SaO2: 97                      RECENT CULTURES:      RADIOLOGY & ADDITIONAL TESTS:                        DVT/GI ppx  Discussed with pt @ bedside

## 2020-09-25 NOTE — PROGRESS NOTE ADULT - SUBJECTIVE AND OBJECTIVE BOX
YUE COTTO  MRN-[id number]  Patient is a 80y old  Male who presents with a chief complaint of respiratory failure (24 Sep 2020 10:33)    HPI:  81 yo M with PMH of COPD (On home O2 3L and BIPAP at while sleeping regardless of time of day), asthma, CAD (w/ 3v CABG ), s/p 2 recent coronary stents at [place], PVD s/p LLE stent (2019), HLD, DM2 (on Metformin and insulin while on steroid tapers), BPH, hx Hypercapnic Respiratory Failure/Cardiac Arrest requiring intubation (), with multiple frequent admissions, last in 2020 Respiratory failure. Wife is at bedside, pt is intubated in the ED. Wife reports that pt was in his normal state of health prior to yesterday evening. He went to sleep and put on his bipap. Overnight and into the early morning pt had shortness of breath. He took off the bipap and put on 4L O2. This did not provide any relief so he switched back to the bipap. This continued all night and into the afternoon. Pts only complaint was shortness of breath. Denied any fever, chills, wheezing, increased cough or sputum production. Pt had no known sick contacts. In the evening wife took pts blood pressure and noted it was 220/>100. Due to respiratory distress and elevated blood pressure she called EMS.     In the ED pt was unable to speak and was in severe respiratory distress so he was intubated. CBC, CMP, coags, lactate, probnp, and procalcitonin were obtained. Significant for leukocytosis 14.65, co233, glucose 211, lactate and troponin wnl. ABG 7.09/>103/155. UA moderate blood. Pt was started on vanc, zosyn. given duoneb x 1, solumedrol 125mg x 1. CXR, EKG pending. (24 Sep 2020 00:15)      Surgery/Hospital course:    Today:  pt is extubated , Pt placed on Bipap 18/5 for support, pt with elevated HR / given additional IV BB, and started on PO BB as home dosing.   pt on steriod taper, elevated glucose , Insulin sliding scale changed  and increased coverage. with diet /  pt is requiring aggressive chest pt / with good clearance of secretion.     REVIEW OF SYSTEMS:  Gen: No fever  EYES/ENT: No visual changes;  No vertigo or throat pain   NECK: No pain   RES:  ++ shortness of breath     CARD: No chest pain   GI: No abdominal pain  : No dysuria  NEURO: No weakness  SKIN: No itching, rashes     Physical Exam:  Vital Signs Last 24 Hrs  T(C): 37 (24 Sep 2020 23:41), Max: 37.7 (24 Sep 2020 15:52)  T(F): 98.6 (24 Sep 2020 23:41), Max: 99.8 (24 Sep 2020 15:52)  HR: 75 (25 Sep 2020 00:00) (75 - 134)  BP: 129/60 (25 Sep 2020 00:00) (91/50 - 193/86)  BP(mean): 86 (25 Sep 2020 00:00) (65 - 127)  RR: 15 (25 Sep 2020 00:00) (15 - 29)  SpO2: 100% (25 Sep 2020 00:00) (97% - 100%)    Gen:  Awake, alert   CNS: non focal 	  Neck: no JVD  RES : clear , no wheezing                      CVS: Regular  rhythm. Normal S1/S2  Abd: Soft, non-distended. Bowel sounds present.  Skin: No rash.  Ext:   dependent edema     ============================I/O===========================   I&O's Detail    23 Sep 2020 07:  -  24 Sep 2020 07:00  --------------------------------------------------------  IN:    IV PiggyBack: 250 mL    IV PiggyBack: 250 mL    IV PiggyBack: 50 mL    Propofol: 177 mL  Total IN: 727 mL    OUT:    Voided (mL): 950 mL  Total OUT: 950 mL    Total NET: -223 mL      24 Sep 2020 07:  -  25 Sep 2020 00:38  --------------------------------------------------------  IN:    IV PiggyBack: 250 mL    Lactated Ringers Bolus: 1000 mL    Oral Fluid: 600 mL    Propofol: 131 mL  Total IN: 1981 mL    OUT:    Indwelling Catheter - Urethral (mL): 850 mL    Voided (mL): 1000 mL  Total OUT: 1850 mL    Total NET: 131 mL        ============================ LABS =========================                        13.3   15.53 )-----------( 259      ( 24 Sep 2020 05:06 )             45.3     -24    139  |  100  |  20  ----------------------------<  360<H>  5.3   |  27  |  1.80<H>    Ca    9.7      24 Sep 2020 05:06  Phos  4.6       Mg     1.8         TPro  8.0  /  Alb  4.0  /  TBili  0.6  /  DBili  x   /  AST  25  /  ALT  25  /  AlkPhos  117  09-24    LIVER FUNCTIONS - ( 24 Sep 2020 05:06 )  Alb: 4.0 g/dL / Pro: 8.0 g/dL / ALK PHOS: 117 U/L / ALT: 25 U/L / AST: 25 U/L / GGT: x           PT/INR - ( 23 Sep 2020 22:04 )   PT: 11.5 sec;   INR: 0.98 ratio         PTT - ( 23 Sep 2020 22:04 )  PTT:33.0 sec  ABG - ( 24 Sep 2020 18:28 )  pH, Arterial: 7.36  pH, Blood: x     /  pCO2: 53    /  pO2: 94    / HCO3: 27    / Base Excess: 4.1   /  SaO2: 97                Urinalysis Basic - ( 23 Sep 2020 22:22 )    Color: Yellow / Appearance: Slightly Turbid / S.025 / pH: x  Gluc: x / Ketone: Negative  / Bili: Negative / Urobili: Negative   Blood: x / Protein: 500 mg/dL / Nitrite: Negative   Leuk Esterase: Negative / RBC: 3-5 /HPF / WBC 0-2   Sq Epi: x / Non Sq Epi: Occasional / Bacteria: Occasional      ======================Micro/Rad/Cardio=================  Culture: Reviewed   CXR: Reviewed  Echo:Reviewed  ======================================================  PAST MEDICAL & SURGICAL HISTORY:  PVD (peripheral vascular disease)    Hypertension    COPD (chronic obstructive pulmonary disease)  on 2L at home and BiPAP at night; intubated     Osteomyelitis    Dyslipidemia    CAD (Coronary Artery Disease)  s/p 3v CABG ; stents placed in [place] in     Diabetes Mellitus, Type II    S/P primary angioplasty with coronary stent    Compound fracture  left leg    CABG (Coronary Artery Bypass Graft)        ====================ASSESSMENT ==============  CNS: intact, follow commands   RES: aggressive chest pt, Bipap support , steroid taper ,   monitor fluid status , ,may need some diuresis in the next few days with hx of chf     CVS: Cont Rate control with BB, increase as hR allows.     Sam: cont close monitoring of creatine,  diuresis PRN     GI: GI prophylaxis, diet advanced   Endo: monitor glycemic control. diet low chol , diabetic diet.   ID:  Skin  Plan:  ====================== NEUROLOGY=====================    ==================== RESPIRATORY======================  Mechanical Ventilation:       albuterol/ipratropium for Nebulization 3 milliLiter(s) Nebulizer every 6 hours    ====================CARDIOVASCULAR==================  metoprolol tartrate 25 milliGRAM(s) Oral every 12 hours    ===================HEMATOLOGIC/ONC ===================  aspirin  chewable 81 milliGRAM(s) Oral daily  clopidogrel Tablet 75 milliGRAM(s) Oral daily  enoxaparin Injectable 40 milliGRAM(s) SubCutaneous daily    ===================== RENAL =========================  Continue monitoring urine output    ==================== GASTROINTESTINAL===================  dextrose 5%. 1000 milliLiter(s) (50 mL/Hr) IV Continuous <Continuous>  pantoprazole    Tablet 40 milliGRAM(s) Oral before breakfast    =======================    ENDOCRINE  =====================  dextrose 40% Gel 15 Gram(s) Oral once PRN Blood Glucose LESS THAN 70 milliGRAM(s)/deciliter  dextrose 50% Injectable 12.5 Gram(s) IV Push once  glucagon  Injectable 1 milliGRAM(s) IntraMuscular once PRN Glucose LESS THAN 70 milligrams/deciliter  insulin lispro (HumaLOG) corrective regimen sliding scale   SubCutaneous three times a day before meals  insulin lispro (HumaLOG) corrective regimen sliding scale   SubCutaneous at bedtime  methylPREDNISolone sodium succinate Injectable 40 milliGRAM(s) IV Push every 8 hours    ========================INFECTIOUS DISEASE================  azithromycin  IVPB 500 milliGRAM(s) IV Intermittent every 24 hours  azithromycin  IVPB      cefTRIAXone   IVPB      cefTRIAXone   IVPB 1000 milliGRAM(s) IV Intermittent every 24 hours      Patient requires continuous monitoring with bedside rhythm monitoring,  , pulse oximetry, bipap  monitoring and intermittent blood gas analysis.  Care plan discussed with ICU care team.  Patient remained critical; required more than usual  care; I have spent 35 minutes providing non-routine post op care, revaluated multiple times during the night

## 2020-09-25 NOTE — PROGRESS NOTE ADULT - ASSESSMENT
81 yo M with PMH of COPD (On home O2 3L and BIPAP at while sleeping regardless of time of day), asthma, CAD (w/ 3v CABG 2004), s/p 2 recent coronary stents at [place], PVD s/p LLE stent (11/2019), HLD, DM2 (on Metformin and insulin while on steroid tapers), BPH, hx Hypercapnic Respiratory Failure/Cardiac Arrest requiring intubation (6/18), with multiple frequent admissions, last in 8/2020 Respiratory failure. Presents to the ER this time for respiratory distress and elevated BP. Was acutely dyspneic in the ER so was intubated. Treated for COPD exac. Admitted to ICU    Plan  NEURO: A&Ox3. no active issues.    CV: RRR. No longer hypertensive. c/w metoprolol 25 mg q12.    PULM: Pt w/ frequent visits for COPD and asthma exacerbations. presented w/ hypercapnic respiratory failure. Now extubated.  Pt is likely a baseline retainer of CO2. Repeat ABG pH 7.36, pCO2 53, pO2 94, bicarb 27. Pt likely baseline CO2 retainer. Will allow for permissive hypercapnia, switch to PO solumedrol. pt wheezing on exam. c/w bronchodilators RTC.    GI: started DASH diet. no issues.    RENAL: MERRILL. Pt Cr improved to 1.5 this am. currently w/ adequate UOP. dced cunha. diurese PRN.    ID: c/w tx for suspected PNA. Blood and Urine cx NGTD. sputum cx w/ gram negative rods. dc azithromycin. c/w ceftriaxone.    HEME: c/w Lovenox for DVT ppx c/w aspirin and clopidogrel DAPT 2/p coronary stens and LLE stent.    ENDO: POCT was 351 this am. s/p lantus 10u this am. Moderate insulin sliding scale increased to q4. POCT increased to q4. will keep BS <180 and adjust scale PRN.    DISPO: extubated. Stable on NC. Monitor in ICU today for improvement of respiratory exam.  transfer to floor/ Dc home pending f/u of home BiPAP settings issue.

## 2020-09-25 NOTE — PROGRESS NOTE ADULT - ASSESSMENT
79 yo M with PMH of COPD (On home O2 3L and BIPAP at while sleeping regardless of time of day), asthma, CAD (w/ 3v CABG 2004), s/p 2 recent coronary stents at [place], PVD s/p LLE stent (11/2019), HLD, DM2 (on Metformin), BPH, hx Hypercapnic Respiratory Failure/Cardiac Arrest requiring intubation (6/18), with multiple frequent admissions, last in 8/2020 for acute hypercapnic respiratory failure, COPD exacerbation presents with acute hypercapnic respiratory failure, intubated in the ED.

## 2020-09-25 NOTE — PROGRESS NOTE ADULT - ASSESSMENT
cont rx   cont rx      COMMODORE YUE [facility] P [phone]  1940 DOA 2020           REVIEW OF SYMPTOMS      Able to give ROS  Yes     RELIABLE No   CONSTITUTIONAL Weakness Yes  Chills No Vision changes No  ENDOCRINE No unexplained hair loss No heat or cold intolerance    ALLERGY No hives  Sore throat No   RESP Coughing blood no  Shortness of breath YES   NEURO No Headache  Confusion Pain neck No   CARDIAC No Chest pain No Palpitations   GI No Pain abdomen NO   Vomiting NO     PHYSICAL EXAM    HEENT Unremarkable PERRLA atraumatic   RESP Fair air entry EXP prolonged    Harsh breath sound Resp distres mild   CARDIAC S1 S2 No S3     NO JVD    ABDOMEN SOFT BS PRESENT NOT DISTENDED No hepatosplenomegaly PEDAL EDEMA present No calf tenderness  NO rash   GENERAL Not TOXIC looking    PT DATA/BEST PRACTICE  ALLERGY     shellfish                WT     2020 100                                 BMI       2020 30                                      ADVANCED DIRECTIVE     LINES TUBES POA.  PROCEDURES.     2020 Intubated                HEAD OF BED ELEVATION Yes      DVT PROPHYLAXIS.   lvnx 40 )      DYSPHAGIA EVAL .    DIET.. dash ()                                                                           IV F.  MCCORMICK PROPHYLAXIS.   INFECTION PPLX       chlorhexidine 2% ()                                                 OXYGENATION        VITALS/LABS       2020 afeb 81 120/60   2020 5a 18/5/.415       PATIENT SUMMARY.   79 m former smoker PMH HTN, DM2 (on home Metformin and glipizide), COPD (2L home/ BIPAP at night), CAD with 3v CABG , Stent 2019 HLD, 10/26/2018 ECHO ef 55% hx hypercapnic resp failure with ho intubation c/b with cardiac arrest (2018) with ho recurrent admissions GOLD D admitted 2020 with progressive sob intubated in er Pulm consulted      COURSE  COPD rxed with bd steroids  INFECTION rxed with rocephin () azithro ()   STENOTROPHOMONAS COLONIZATION ? SPUTUM  stenotrophomonas   VENT Weaned off vent Intubated  Extubated    ABG 2020 6p cpap5/10/.3  736/53/94   MERRILL Cr increased to 1.6 on 2020   COVID Ruled out 2020   ECHO 2020 50% collapse of ivc dd1 n lvsf              PROBLEM/ASSESSMENT/RECOMMENDATIONS.         MERRILL Monitor cr  RESP FAILURE Monitor closely Is on  noct bpap   COPD ex Cont bd steroids Taper steroids   CAD On dapt bb        TIME SPENT   Over 25 minutes aggregate care time spent on encounter; activities included   direct patient care, counseling and/or coordinating care reviewing notes, lab data/ imaging , discussion with multidisciplinary team/ patient  /family and explaining in detail risks, benefits, alternatives  of the recommendations     COMMODORE TURNER [facility] P [phone]  1940 DOA 2020

## 2020-09-26 ENCOUNTER — TRANSCRIPTION ENCOUNTER (OUTPATIENT)
Age: 80
End: 2020-09-26

## 2020-09-26 VITALS — DIASTOLIC BLOOD PRESSURE: 72 MMHG | SYSTOLIC BLOOD PRESSURE: 140 MMHG | RESPIRATION RATE: 24 BRPM | HEART RATE: 82 BPM

## 2020-09-26 LAB
-  CEFTAZIDIME: SIGNIFICANT CHANGE UP
-  LEVOFLOXACIN: SIGNIFICANT CHANGE UP
-  TRIMETHOPRIM/SULFAMETHOXAZOLE: SIGNIFICANT CHANGE UP
ANION GAP SERPL CALC-SCNC: 4 MMOL/L — LOW (ref 5–17)
BUN SERPL-MCNC: 37 MG/DL — HIGH (ref 7–23)
CALCIUM SERPL-MCNC: 9.4 MG/DL — SIGNIFICANT CHANGE UP (ref 8.5–10.1)
CHLORIDE SERPL-SCNC: 102 MMOL/L — SIGNIFICANT CHANGE UP (ref 96–108)
CO2 SERPL-SCNC: 34 MMOL/L — HIGH (ref 22–31)
CREAT SERPL-MCNC: 1.3 MG/DL — SIGNIFICANT CHANGE UP (ref 0.5–1.3)
CULTURE RESULTS: SIGNIFICANT CHANGE UP
GLUCOSE SERPL-MCNC: 228 MG/DL — HIGH (ref 70–99)
HCT VFR BLD CALC: 35.9 % — LOW (ref 39–50)
HGB BLD-MCNC: 11.5 G/DL — LOW (ref 13–17)
MAGNESIUM SERPL-MCNC: 2.5 MG/DL — SIGNIFICANT CHANGE UP (ref 1.6–2.6)
MCHC RBC-ENTMCNC: 28 PG — SIGNIFICANT CHANGE UP (ref 27–34)
MCHC RBC-ENTMCNC: 32 GM/DL — SIGNIFICANT CHANGE UP (ref 32–36)
MCV RBC AUTO: 87.3 FL — SIGNIFICANT CHANGE UP (ref 80–100)
METHOD TYPE: SIGNIFICANT CHANGE UP
NRBC # BLD: 0 /100 WBCS — SIGNIFICANT CHANGE UP (ref 0–0)
ORGANISM # SPEC MICROSCOPIC CNT: SIGNIFICANT CHANGE UP
ORGANISM # SPEC MICROSCOPIC CNT: SIGNIFICANT CHANGE UP
PHOSPHATE SERPL-MCNC: 3.6 MG/DL — SIGNIFICANT CHANGE UP (ref 2.5–4.5)
PLATELET # BLD AUTO: 241 K/UL — SIGNIFICANT CHANGE UP (ref 150–400)
POTASSIUM SERPL-MCNC: 5.1 MMOL/L — SIGNIFICANT CHANGE UP (ref 3.5–5.3)
POTASSIUM SERPL-SCNC: 5.1 MMOL/L — SIGNIFICANT CHANGE UP (ref 3.5–5.3)
RBC # BLD: 4.11 M/UL — LOW (ref 4.2–5.8)
RBC # FLD: 14.4 % — SIGNIFICANT CHANGE UP (ref 10.3–14.5)
SODIUM SERPL-SCNC: 140 MMOL/L — SIGNIFICANT CHANGE UP (ref 135–145)
SPECIMEN SOURCE: SIGNIFICANT CHANGE UP
WBC # BLD: 12.89 K/UL — HIGH (ref 3.8–10.5)
WBC # FLD AUTO: 12.89 K/UL — HIGH (ref 3.8–10.5)

## 2020-09-26 PROCEDURE — 71045 X-RAY EXAM CHEST 1 VIEW: CPT

## 2020-09-26 PROCEDURE — 87086 URINE CULTURE/COLONY COUNT: CPT

## 2020-09-26 PROCEDURE — 83880 ASSAY OF NATRIURETIC PEPTIDE: CPT

## 2020-09-26 PROCEDURE — 90686 IIV4 VACC NO PRSV 0.5 ML IM: CPT

## 2020-09-26 PROCEDURE — 85730 THROMBOPLASTIN TIME PARTIAL: CPT

## 2020-09-26 PROCEDURE — 99233 SBSQ HOSP IP/OBS HIGH 50: CPT | Mod: GC

## 2020-09-26 PROCEDURE — 99291 CRITICAL CARE FIRST HOUR: CPT | Mod: 25

## 2020-09-26 PROCEDURE — 94003 VENT MGMT INPAT SUBQ DAY: CPT

## 2020-09-26 PROCEDURE — 94770: CPT

## 2020-09-26 PROCEDURE — U0003: CPT

## 2020-09-26 PROCEDURE — 99231 SBSQ HOSP IP/OBS SF/LOW 25: CPT

## 2020-09-26 PROCEDURE — 36600 WITHDRAWAL OF ARTERIAL BLOOD: CPT

## 2020-09-26 PROCEDURE — 84100 ASSAY OF PHOSPHORUS: CPT

## 2020-09-26 PROCEDURE — 82962 GLUCOSE BLOOD TEST: CPT

## 2020-09-26 PROCEDURE — 94660 CPAP INITIATION&MGMT: CPT

## 2020-09-26 PROCEDURE — 86769 SARS-COV-2 COVID-19 ANTIBODY: CPT

## 2020-09-26 PROCEDURE — 87040 BLOOD CULTURE FOR BACTERIA: CPT

## 2020-09-26 PROCEDURE — 83735 ASSAY OF MAGNESIUM: CPT

## 2020-09-26 PROCEDURE — 87186 SC STD MICRODIL/AGAR DIL: CPT

## 2020-09-26 PROCEDURE — 87070 CULTURE OTHR SPECIMN AEROBIC: CPT

## 2020-09-26 PROCEDURE — 80053 COMPREHEN METABOLIC PANEL: CPT

## 2020-09-26 PROCEDURE — 85027 COMPLETE CBC AUTOMATED: CPT

## 2020-09-26 PROCEDURE — 82803 BLOOD GASES ANY COMBINATION: CPT

## 2020-09-26 PROCEDURE — 36415 COLL VENOUS BLD VENIPUNCTURE: CPT

## 2020-09-26 PROCEDURE — 99232 SBSQ HOSP IP/OBS MODERATE 35: CPT

## 2020-09-26 PROCEDURE — 94002 VENT MGMT INPAT INIT DAY: CPT

## 2020-09-26 PROCEDURE — 99221 1ST HOSP IP/OBS SF/LOW 40: CPT

## 2020-09-26 PROCEDURE — 84145 PROCALCITONIN (PCT): CPT

## 2020-09-26 PROCEDURE — 93306 TTE W/DOPPLER COMPLETE: CPT

## 2020-09-26 PROCEDURE — 31500 INSERT EMERGENCY AIRWAY: CPT

## 2020-09-26 PROCEDURE — 80048 BASIC METABOLIC PNL TOTAL CA: CPT

## 2020-09-26 PROCEDURE — 83605 ASSAY OF LACTIC ACID: CPT

## 2020-09-26 PROCEDURE — 96365 THER/PROPH/DIAG IV INF INIT: CPT | Mod: XU

## 2020-09-26 PROCEDURE — 85025 COMPLETE CBC W/AUTO DIFF WBC: CPT

## 2020-09-26 PROCEDURE — 94640 AIRWAY INHALATION TREATMENT: CPT

## 2020-09-26 PROCEDURE — 96375 TX/PRO/DX INJ NEW DRUG ADDON: CPT | Mod: XU

## 2020-09-26 PROCEDURE — 93005 ELECTROCARDIOGRAM TRACING: CPT

## 2020-09-26 PROCEDURE — 81001 URINALYSIS AUTO W/SCOPE: CPT

## 2020-09-26 PROCEDURE — 85610 PROTHROMBIN TIME: CPT

## 2020-09-26 RX ORDER — INSULIN LISPRO 100/ML
4 VIAL (ML) SUBCUTANEOUS
Refills: 0 | Status: DISCONTINUED | OUTPATIENT
Start: 2020-09-26 | End: 2020-09-26

## 2020-09-26 RX ORDER — CIPROFLOXACIN LACTATE 400MG/40ML
1 VIAL (ML) INTRAVENOUS
Qty: 4 | Refills: 0
Start: 2020-09-26 | End: 2020-09-29

## 2020-09-26 RX ORDER — INFLUENZA VIRUS VACCINE 15; 15; 15; 15 UG/.5ML; UG/.5ML; UG/.5ML; UG/.5ML
0.5 SUSPENSION INTRAMUSCULAR ONCE
Refills: 0 | Status: COMPLETED | OUTPATIENT
Start: 2020-09-26 | End: 2020-09-26

## 2020-09-26 RX ADMIN — ENOXAPARIN SODIUM 40 MILLIGRAM(S): 100 INJECTION SUBCUTANEOUS at 11:24

## 2020-09-26 RX ADMIN — Medication 81 MILLIGRAM(S): at 11:24

## 2020-09-26 RX ADMIN — Medication 3 MILLILITER(S): at 15:02

## 2020-09-26 RX ADMIN — INFLUENZA VIRUS VACCINE 0.5 MILLILITER(S): 15; 15; 15; 15 SUSPENSION INTRAMUSCULAR at 15:14

## 2020-09-26 RX ADMIN — Medication 3 MILLILITER(S): at 08:05

## 2020-09-26 RX ADMIN — CLOPIDOGREL BISULFATE 75 MILLIGRAM(S): 75 TABLET, FILM COATED ORAL at 11:24

## 2020-09-26 RX ADMIN — INSULIN GLARGINE 12 UNIT(S): 100 INJECTION, SOLUTION SUBCUTANEOUS at 08:32

## 2020-09-26 RX ADMIN — Medication 40 MILLIGRAM(S): at 05:34

## 2020-09-26 RX ADMIN — CHLORHEXIDINE GLUCONATE 1 APPLICATION(S): 213 SOLUTION TOPICAL at 05:34

## 2020-09-26 RX ADMIN — Medication 8: at 11:32

## 2020-09-26 RX ADMIN — Medication 4: at 08:30

## 2020-09-26 RX ADMIN — Medication 25 MILLIGRAM(S): at 11:24

## 2020-09-26 RX ADMIN — Medication 3 MILLILITER(S): at 01:22

## 2020-09-26 RX ADMIN — CEFTRIAXONE 100 MILLIGRAM(S): 500 INJECTION, POWDER, FOR SOLUTION INTRAMUSCULAR; INTRAVENOUS at 11:24

## 2020-09-26 NOTE — DISCHARGE NOTE NURSING/CASE MANAGEMENT/SOCIAL WORK - PATIENT PORTAL LINK FT
You can access the FollowMyHealth Patient Portal offered by [facility] by registering at the following website: [email or web address]. By joining [employer]’s FollowMyHealth portal, you will also be able to view your health information using other applications (apps) compatible with our system.

## 2020-09-26 NOTE — DISCHARGE NOTE PROVIDER - NSDCCPCAREPLAN_GEN_ALL_CORE_FT
PRINCIPAL DISCHARGE DIAGNOSIS  Diagnosis: Acute respiratory failure with hypoxia  Assessment and Plan of Treatment: You were treated for respiratory failure. You were briefly intuabted and eventaully extubated. You were treated with steroids and antibiotics. Please continue to take prednisone 40mg daily prescribed. Please follow up with Dr. Marcelino at discharge.      SECONDARY DISCHARGE DIAGNOSES  Diagnosis: Chronic obstructive pulmonary disease with acute exacerbation  Assessment and Plan of Treatment: continue to take your steroids as prescribed. continue to use your biPAP while you sleep and your oxygen during the day.     PRINCIPAL DISCHARGE DIAGNOSIS  Diagnosis: Acute respiratory failure with hypoxia  Assessment and Plan of Treatment: You were treated for respiratory failure. You were briefly intuabted and eventaully extubated. You were treated with steroids and antibiotics. Please continue to take prednisone 40mg daily prescribed. Please follow up with Dr. Marcelino at discharge. Please continue to take levaquin once a day for 4 days.      SECONDARY DISCHARGE DIAGNOSES  Diagnosis: Chronic obstructive pulmonary disease with acute exacerbation  Assessment and Plan of Treatment: continue to take your steroids as prescribed. continue to use your biPAP while you sleep and your oxygen during the day. Please continue to take levaquin dailyr for 4 days.

## 2020-09-26 NOTE — PROGRESS NOTE ADULT - SUBJECTIVE AND OBJECTIVE BOX
Patient is a 80y old  Male who presents with a chief complaint of respiratory failure (26 Sep 2020 10:46)        HPI:  81 yo M with PMH of COPD (On home O2 3L and BIPAP at while sleeping regardless of time of day), asthma, CAD (w/ 3v CABG 2004), s/p 2 recent coronary stents at [place], PVD s/p LLE stent (11/2019), HLD, DM2 (on Metformin and insulin while on steroid tapers), BPH, hx Hypercapnic Respiratory Failure/Cardiac Arrest requiring intubation (6/18), with multiple frequent admissions, last in 8/2020 Respiratory failure. Wife is at bedside, pt is intubated in the ED. Wife reports that pt was in his normal state of health prior to yesterday evening. He went to sleep and put on his bipap. Overnight and into the early morning pt had shortness of breath. He took off the bipap and put on 4L O2. This did not provide any relief so he switched back to the bipap. This continued all night and into the afternoon. Pts only complaint was shortness of breath. Denied any fever, chills, wheezing, increased cough or sputum production. Pt had no known sick contacts. In the evening wife took pts blood pressure and noted it was 220/>100. Due to respiratory distress and elevated blood pressure she called EMS.     In the ED pt was unable to speak and was in severe respiratory distress so he was intubated. CBC, CMP, coags, lactate, probnp, and procalcitonin were obtained. Significant for leukocytosis 14.65, co233, glucose 211, lactate and troponin wnl. ABG 7.09/>103/155. UA moderate blood. Pt was started on vanc, zosyn. given duoneb x 1, solumedrol 125mg x 1. CXR, EKG pending. (24 Sep 2020 00:15)      SUBJECTIVE & OBJECTIVE: Pt seen and examined at bedside. extubated, tolerating well    PHYSICAL EXAM:  T(C): 36.7 (09-26-20 @ 07:40), Max: 37.1 (09-25-20 @ 15:17)  HR: 95 (09-26-20 @ 09:00) (53 - 95)  BP: 149/67 (09-26-20 @ 09:00) (118/56 - 170/76)  RR: 28 (09-26-20 @ 09:00) (13 - 31)  SpO2: 77% (09-26-20 @ 09:00) (68% - 100%)  Wt(kg): --   GENERAL:extubated, tolerting well   HEAD:  Atraumatic, Normocephalic  NERVOUS SYSTEM:  Alert & Oriented X3,   CHEST/LUNG: cta  HEART: Regular rate and rhythm; No murmurs, rubs, or gallops  ABDOMEN: Soft, Nontender, Nondistended; Bowel sounds present  EXTREMITIES:  2+ Peripheral Pulses, No clubbing, cyanosis, or edema        MEDICATIONS  (STANDING):  albuterol/ipratropium for Nebulization 3 milliLiter(s) Nebulizer every 6 hours  aspirin  chewable 81 milliGRAM(s) Oral daily  cefTRIAXone   IVPB      cefTRIAXone   IVPB 1000 milliGRAM(s) IV Intermittent every 24 hours  chlorhexidine 2% Cloths 1 Application(s) Topical <User Schedule>  clopidogrel Tablet 75 milliGRAM(s) Oral daily  dextrose 5%. 1000 milliLiter(s) (50 mL/Hr) IV Continuous <Continuous>  dextrose 50% Injectable 12.5 Gram(s) IV Push once  enoxaparin Injectable 40 milliGRAM(s) SubCutaneous daily  insulin glargine Injectable (LANTUS) 12 Unit(s) SubCutaneous every morning  insulin lispro (HumaLOG) corrective regimen sliding scale   SubCutaneous three times a day before meals  insulin lispro (HumaLOG) corrective regimen sliding scale   SubCutaneous at bedtime  metoprolol tartrate 25 milliGRAM(s) Oral every 12 hours    MEDICATIONS  (PRN):  dextrose 40% Gel 15 Gram(s) Oral once PRN Blood Glucose LESS THAN 70 milliGRAM(s)/deciliter  glucagon  Injectable 1 milliGRAM(s) IntraMuscular once PRN Glucose LESS THAN 70 milligrams/deciliter      LABS:                        11.5   12.89 )-----------( 241      ( 26 Sep 2020 04:58 )             35.9     09-26    140  |  102  |  37<H>  ----------------------------<  228<H>  5.1   |  34<H>  |  1.30    Ca    9.4      26 Sep 2020 04:57  Phos  3.6     09-26  Mg     2.5     09-26    TPro  6.3  /  Alb  3.0<L>  /  TBili  0.3  /  DBili  x   /  AST  12<L>  /  ALT  18  /  AlkPhos  81  09-25        Magnesium, Serum: 2.5 mg/dL (09-26 @ 04:57)    CAPILLARY BLOOD GLUCOSE      POCT Blood Glucose.: 238 mg/dL (26 Sep 2020 08:24)  POCT Blood Glucose.: 267 mg/dL (25 Sep 2020 21:05)  POCT Blood Glucose.: 117 mg/dL (25 Sep 2020 17:11)  POCT Blood Glucose.: 266 mg/dL (25 Sep 2020 12:21)      CAPILLARY BLOOD GLUCOSE      POCT Blood Glucose.: 238 mg/dL (26 Sep 2020 08:24)  POCT Blood Glucose.: 267 mg/dL (25 Sep 2020 21:05)  POCT Blood Glucose.: 117 mg/dL (25 Sep 2020 17:11)  POCT Blood Glucose.: 266 mg/dL (25 Sep 2020 12:21)    CAPILLARY BLOOD GLUCOSE      POCT Blood Glucose.: 238 mg/dL (26 Sep 2020 08:24)    ABG - ( 24 Sep 2020 18:28 )  pH, Arterial: 7.36  pH, Blood: x     /  pCO2: 53    /  pO2: 94    / HCO3: 27    / Base Excess: 4.1   /  SaO2: 97                      RECENT CULTURES:      RADIOLOGY & ADDITIONAL TESTS:                        DVT/GI ppx  Discussed with pt @ bedside

## 2020-09-26 NOTE — PROGRESS NOTE ADULT - ASSESSMENT
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

## 2020-09-26 NOTE — PROGRESS NOTE ADULT - PROBLEM SELECTOR PLAN 4
Detail Level: Detailed
Chronic  -resume rosuvastatin when able

## 2020-09-26 NOTE — DISCHARGE NOTE PROVIDER - HOSPITAL COURSE
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 Wife is at bedside, pt is intubated in the ED. Wife reports that pt was in his normal state of health prior to yesterday evening. He went to sleep and put on his bipap. Overnight and into the early morning pt had shortness of breath. He took off the bipap and put on 4L O2. This did not provide any relief so he switched back to the bipap. This continued all night and into the afternoon. Pts only complaint was shortness of breath. Denied any fever, chills, wheezing, increased cough or sputum production. Pt had no known sick contacts. In the evening wife took pts blood pressure and noted it was 220/>100. Due to respiratory distress and elevated blood pressure she called EMS.     In the ED pt was unable to speak and was in severe respiratory distress so he was intubated. CBC, CMP, coags, lactate, probnp, and procalcitonin were obtained. Significant for leukocytosis 14.65, co233, glucose 211, lactate and troponin wnl. ABG 7.09/>103/155. UA moderate blood. Pt was started on vanc, zosyn. given duoneb x 1, solumedrol 125mg x 1. CXR, EKG pending. (24 Sep 2020 00:15)      ---  HOSPITAL COURSE:  Patient admitted to the ICU with hypercapnic respiratory failure. He was intubated on admission. He was extubated the next day. He tolerated his home dose nasal cannula during the day. He was on biPAP qhs which is his baseline. He was treated for suspected COPD exacerbation and PNA. He was placed on steroids, bronchodilators and antibiotics. He was treated for suspected PNA with IV antibiotics. His sputum culture grew moderate Stenotrophomonas maltophilia and normal respiratory failure. He developed a mild MERRILL secondary to diuresis which improved with fluids. He improved throughout his clinical course. He is stable for discharge at this time with close outpatient follow up.   ---   CONSULTANTS:   Linda Dejesus     ---  TIME SPENT:  I, the attending physician, was physically present for the key portions of the evaluation and management (E/M) service provided. 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 Wife is at bedside, pt is intubated in the ED. Wife reports that pt was in his normal state of health prior to yesterday evening. He went to sleep and put on his bipap. Overnight and into the early morning pt had shortness of breath. He took off the bipap and put on 4L O2. This did not provide any relief so he switched back to the bipap. This continued all night and into the afternoon. Pts only complaint was shortness of breath. Denied any fever, chills, wheezing, increased cough or sputum production. Pt had no known sick contacts. In the evening wife took pts blood pressure and noted it was 220/>100. Due to respiratory distress and elevated blood pressure she called EMS.     In the ED pt was unable to speak and was in severe respiratory distress so he was intubated. CBC, CMP, coags, lactate, probnp, and procalcitonin were obtained. Significant for leukocytosis 14.65, co233, glucose 211, lactate and troponin wnl. ABG 7.09/>103/155. UA moderate blood. Pt was started on vanc, zosyn. given duoneb x 1, solumedrol 125mg x 1. CXR, EKG pending. (24 Sep 2020 00:15)      ---  HOSPITAL COURSE:  Patient admitted to the ICU with hypercapnic respiratory failure. He was intubated on admission. He was extubated the next day. He tolerated his home dose nasal cannula during the day. He was on biPAP qhs which is his baseline. He was treated for suspected COPD exacerbation and PNA. He was placed on steroids, bronchodilators and antibiotics. He was treated for suspected PNA with IV antibiotics. His sputum culture grew moderate Stenotrophomonas maltophilia and normal respiratory failure. He developed a mild MERRILL secondary to diuresis which improved with fluids. He improved throughout his clinical course. He is stable for discharge at this time with close outpatient follow up.       On Discharge   T(C): 36.7 (09-26-20 @ 07:40), Max: 37.1 (09-25-20 @ 15:17)  HR: 59 (09-26-20 @ 07:00) (53 - 105)  BP: 140/71 (09-26-20 @ 07:00) (118/56 - 170/76)  RR: 25 (09-26-20 @ 07:00) (13 - 31)  SpO2: 100% (09-26-20 @ 07:00) (68% - 100%)      PHYSICAL EXAM:  General: elderly male NAD.  HEENT: NCAT. Pupils equal, reactive to light. Symmetric.  PULM: poor air movement,. b/l wheezes   CVS: RRR, no murmurs, rubs, gallops   ABD: Soft, nondistended, nontender, normoactive bowel sounds, no masses  EXT: trace b/l LE edema  SKIN: Warm and well perfused  NEURO: A&Ox3  ---   CONSULTANTS:   Victoria: Oleg     ---  TIME SPENT:  I, the attending physician, was physically present for the key portions of the evaluation and management (E/M) service provided. The total amount of time spent reviewing the hospital notes, laboratory values, imaging findings, assessing/counseling the patient, discussing with consultant physicians, social work, nursing staff was -- minutes    ---  Primary care provider was made aware of plan for discharge:      [  ] NO     [  ] YES   HPI:  81 yo M with PMH of COPD (On home O2 3L and BIPAP at while sleeping regardless of time of day), asthma, CAD (w/ 3v CABG 2004), s/p 2 recent coronary stents at [place], PVD s/p LLE stent (11/2019), HLD, DM2 (on Metformin and insulin while on steroid tapers), BPH, hx Hypercapnic Respiratory Failure/Cardiac Arrest requiring intubation (6/18), with multiple frequent admissions, last in 8/2020 Respiratory failure. Wife is at bedside, pt is intubated in the ED. Wife reports that pt was in his normal state of health prior to yesterday evening. He went to sleep and put on his bipap. Overnight and into the early morning pt had shortness of breath. He took off the bipap and put on 4L O2. This did not provide any relief so he switched back to the bipap. This continued all night and into the afternoon. Pts only complaint was shortness of breath. Denied any fever, chills, wheezing, increased cough or sputum production. Pt had no known sick contacts. In the evening wife took pts blood pressure and noted it was 220/>100. Due to respiratory distress and elevated blood pressure she called EMS.     In the ED pt was unable to speak and was in severe respiratory distress so he was intubated. CBC, CMP, coags, lactate, probnp, and procalcitonin were obtained. Significant for leukocytosis 14.65, co233, glucose 211, lactate and troponin wnl. ABG 7.09/>103/155. UA moderate blood. Pt was started on vanc, zosyn. given duoneb x 1, solumedrol 125mg x 1. CXR, EKG pending. (24 Sep 2020 00:15)      ---  HOSPITAL COURSE:  Patient admitted to the ICU with hypercapnic respiratory failure. He was intubated on admission. He was extubated the next day. He tolerated his home dose nasal cannula during the day. He was on biPAP qhs which is his baseline. He was treated for suspected COPD exacerbation and PNA. He was placed on steroids, bronchodilators and antibiotics. He was treated for suspected PNA with IV antibiotics. His sputum culture grew moderate Stenotrophomonas maltophilia and normal respiratory failure. He developed a mild MERRILL secondary to diuresis which improved with fluids. He improved throughout his clinical course. He is stable for discharge at this time with close outpatient follow up.       On Discharge   T(C): 36.7 (09-26-20 @ 07:40), Max: 37.1 (09-25-20 @ 15:17)  HR: 59 (09-26-20 @ 07:00) (53 - 105)  BP: 140/71 (09-26-20 @ 07:00) (118/56 - 170/76)  RR: 25 (09-26-20 @ 07:00) (13 - 31)  SpO2: 100% (09-26-20 @ 07:00) (68% - 100%)      PHYSICAL EXAM:  General: elderly male NAD.  HEENT: NCAT. Pupils equal, reactive to light. Symmetric.  PULM: cta b/l    CVS: RRR, no murmurs, rubs, gallops   ABD: Soft, nondistended, nontender, normoactive bowel sounds, no masses  EXT: trace b/l LE edema  SKIN: Warm and well perfused  NEURO: A&Ox3  ---   CONSULTANTS:   Victoria: Oleg     ---  TIME SPENT:  I, the attending physician, was physically present for the key portions of the evaluation and management (E/M) service provided. The total amount of time spent reviewing the hospital notes, laboratory values, imaging findings, assessing/counseling the patient, discussing with consultant physicians, social work, nursing staff was -- minutes    ---  Primary care provider was made aware of plan for discharge:      [  ] NO     [  ] YES   HPI:  79 yo M with PMH of COPD (On home O2 3L and BIPAP at while sleeping regardless of time of day), asthma, CAD (w/ 3v CABG 2004), s/p 2 recent coronary stents at [place], PVD s/p LLE stent (11/2019), HLD, DM2 (on Metformin and insulin while on steroid tapers), BPH, hx Hypercapnic Respiratory Failure/Cardiac Arrest requiring intubation (6/18), with multiple frequent admissions, last in 8/2020 Respiratory failure. Wife is at bedside, pt is intubated in the ED. Wife reports that pt was in his normal state of health prior to yesterday evening. He went to sleep and put on his bipap. Overnight and into the early morning pt had shortness of breath. He took off the bipap and put on 4L O2. This did not provide any relief so he switched back to the bipap. This continued all night and into the afternoon. Pts only complaint was shortness of breath. Denied any fever, chills, wheezing, increased cough or sputum production. Pt had no known sick contacts. In the evening wife took pts blood pressure and noted it was 220/>100. Due to respiratory distress and elevated blood pressure she called EMS.     In the ED pt was unable to speak and was in severe respiratory distress so he was intubated. CBC, CMP, coags, lactate, probnp, and procalcitonin were obtained. Significant for leukocytosis 14.65, co233, glucose 211, lactate and troponin wnl. ABG 7.09/>103/155. UA moderate blood. Tachycardia likely multifactorial, both reactive and secondary to albuterol inhalers/nebs. Pt was started on vanc, zosyn. given duoneb x 1, solumedrol 125mg x 1. CXR, EKG pending. (24 Sep 2020 00:15)      ---  HOSPITAL COURSE:  Patient admitted to the ICU with hypercapnic respiratory failure. Altered on presentation due to hypercarbia and severe acidosis. He was intubated in ED prior to admission. He was extubated the next day. He tolerated his home dose nasal cannula during the day. He was on biPAP qhs which is his baseline. He was treated for suspected COPD exacerbation and PNA. He was placed on steroids, bronchodilators and antibiotics. He was treated for suspected PNA with IV antibiotics. His sputum culture grew moderate Stenotrophomonas maltophilia and normal respiratory failure. He developed a mild MERRILL secondary to diuresis which improved with fluids. He improved throughout his clinical course. He is stable for discharge at this time with close outpatient follow up.       On Discharge   T(C): 36.7 (09-26-20 @ 07:40), Max: 37.1 (09-25-20 @ 15:17)  HR: 59 (09-26-20 @ 07:00) (53 - 105)  BP: 140/71 (09-26-20 @ 07:00) (118/56 - 170/76)  RR: 25 (09-26-20 @ 07:00) (13 - 31)  SpO2: 100% (09-26-20 @ 07:00) (68% - 100%)      PHYSICAL EXAM:  General: elderly male NAD.  HEENT: NCAT. Pupils equal, reactive to light. Symmetric.  PULM: cta b/l    CVS: RRR, no murmurs, rubs, gallops   ABD: Soft, nondistended, nontender, normoactive bowel sounds, no masses  EXT: trace b/l LE edema  SKIN: Warm and well perfused  NEURO: A&Ox3  ---   CONSULTANTS:   Pulm: Oleg     ---  TIME SPENT:  I, the attending physician, was physically present for the key portions of the evaluation and management (E/M) service provided. The total amount of time spent reviewing the hospital notes, laboratory values, imaging findings, assessing/counseling the patient, discussing with consultant physicians, social work, nursing staff was -- minutes    ---  Primary care provider was made aware of plan for discharge:      [  ] NO     [  ] YES

## 2020-09-26 NOTE — PROGRESS NOTE ADULT - PROBLEM SELECTOR PLAN 1
- pt presenting in severe respiratory distress with significant hypercapnia, intubated in the ED. Admit to ICU  - Resp failure 2/2 acute COPD exacerbation.   extuabted  tolerating well  as per icu will start d/c planning

## 2020-09-26 NOTE — DISCHARGE NOTE PROVIDER - NSDCMRMEDTOKEN_GEN_ALL_CORE_FT
aspirin 81 mg oral delayed release tablet: 1 tab(s) orally once a day  clopidogrel 75 mg oral tablet: 1 tab(s) orally once a day  Deltasone 20 mg oral tablet: 2 tab(s) orally once a day   glipiZIDE 5 mg oral tablet: 1 tab(s) orally once a day  Lopressor: 12.5 milligram(s) orally 2 times a day  metFORMIN 1000 mg oral tablet: 1 tab(s) orally 2 times a day  montelukast 10 mg oral tablet: 1 tab(s) orally once a day  NovoLOG 100 units/mL subcutaneous solution: 5 unit(s) subcutaneous once a day, while on prednisone  rosuvastatin 20 mg oral tablet: 1 tab(s) orally once a day   aspirin 81 mg oral delayed release tablet: 1 tab(s) orally once a day  clopidogrel 75 mg oral tablet: 1 tab(s) orally once a day  Deltasone 20 mg oral tablet: 2 tab(s) orally once a day   glipiZIDE 5 mg oral tablet: 1 tab(s) orally once a day  levoFLOXacin 750 mg oral tablet: 1 tab(s) orally once a day   Lopressor: 12.5 milligram(s) orally 2 times a day  metFORMIN 1000 mg oral tablet: 1 tab(s) orally 2 times a day  montelukast 10 mg oral tablet: 1 tab(s) orally once a day  NovoLOG 100 units/mL subcutaneous solution: 5 unit(s) subcutaneous once a day, while on prednisone  rosuvastatin 20 mg oral tablet: 1 tab(s) orally once a day

## 2020-09-26 NOTE — DISCHARGE NOTE PROVIDER - CARE PROVIDER_API CALL
Oscar Marcelino)  Critical Care Medicine; Pulmonary Disease  [address], [facility] [address]  [place], [place] [id number]  Phone: [phone]  Fax: [phone]  Follow Up Time: 1 week

## 2020-09-26 NOTE — PROGRESS NOTE ADULT - SUBJECTIVE AND OBJECTIVE BOX
YUE COTTO    [facility] ICU1 10    Patient is a 80y old  Male who presents with a chief complaint of respiratory failure (26 Sep 2020 07:29)       Allergies    No Known Allergies    Intolerances    shellfish (Nausea)      HPI:  81 yo M with PMH of COPD (On home O2 3L and BIPAP at while sleeping regardless of time of day), asthma, CAD (w/ 3v CABG ), s/p 2 recent coronary stents at [place], PVD s/p LLE stent (2019), HLD, DM2 (on Metformin and insulin while on steroid tapers), BPH, hx Hypercapnic Respiratory Failure/Cardiac Arrest requiring intubation (), with multiple frequent admissions, last in 2020 Respiratory failure. Wife is at bedside, pt is intubated in the ED. Wife reports that pt was in his normal state of health prior to yesterday evening. He went to sleep and put on his bipap. Overnight and into the early morning pt had shortness of breath. He took off the bipap and put on 4L O2. This did not provide any relief so he switched back to the bipap. This continued all night and into the afternoon. Pts only complaint was shortness of breath. Denied any fever, chills, wheezing, increased cough or sputum production. Pt had no known sick contacts. In the evening wife took pts blood pressure and noted it was 220/>100. Due to respiratory distress and elevated blood pressure she called EMS.     In the ED pt was unable to speak and was in severe respiratory distress so he was intubated. CBC, CMP, coags, lactate, probnp, and procalcitonin were obtained. Significant for leukocytosis 14.65, co233, glucose 211, lactate and troponin wnl. ABG 7.09/>103/155. UA moderate blood. Pt was started on vanc, zosyn. given duoneb x 1, solumedrol 125mg x 1. CXR, EKG pending. (24 Sep 2020 00:15)      PAST MEDICAL & SURGICAL HISTORY:  PVD (peripheral vascular disease)    Hypertension    COPD (chronic obstructive pulmonary disease)  on 2L at home and BiPAP at night; intubated     Osteomyelitis    Dyslipidemia    CAD (Coronary Artery Disease)  s/p 3v CABG ; stents placed in [place] in     Diabetes Mellitus, Type II    S/P primary angioplasty with coronary stent    Compound fracture  left leg    CABG (Coronary Artery Bypass Graft)          FAMILY HISTORY:  Family hx of lung cancer  brother,  age 82, used to smoke with pt    Family history of diabetes mellitus (Sibling)          MEDICATIONS   albuterol/ipratropium for Nebulization 3 milliLiter(s) Nebulizer every 6 hours  aspirin  chewable 81 milliGRAM(s) Oral daily  cefTRIAXone   IVPB      cefTRIAXone   IVPB 1000 milliGRAM(s) IV Intermittent every 24 hours  chlorhexidine 2% Cloths 1 Application(s) Topical <User Schedule>  clopidogrel Tablet 75 milliGRAM(s) Oral daily  dextrose 40% Gel 15 Gram(s) Oral once PRN  dextrose 5%. 1000 milliLiter(s) IV Continuous <Continuous>  dextrose 50% Injectable 12.5 Gram(s) IV Push once  enoxaparin Injectable 40 milliGRAM(s) SubCutaneous daily  glucagon  Injectable 1 milliGRAM(s) IntraMuscular once PRN  insulin glargine Injectable (LANTUS) 12 Unit(s) SubCutaneous every morning  insulin lispro (HumaLOG) corrective regimen sliding scale   SubCutaneous three times a day before meals  insulin lispro (HumaLOG) corrective regimen sliding scale   SubCutaneous at bedtime  metoprolol tartrate 25 milliGRAM(s) Oral every 12 hours      Vital Signs Last 24 Hrs  T(C): 36.7 (26 Sep 2020 07:40), Max: 37.1 (25 Sep 2020 15:17)  T(F): 98 (26 Sep 2020 07:40), Max: 98.7 (25 Sep 2020 15:17)  HR: 95 (26 Sep 2020 09:00) (53 - 105)  BP: 149/67 (26 Sep 2020 09:00) (118/56 - 170/76)  BP(mean): 96 (26 Sep 2020 09:00) (81 - 125)  RR: 28 (26 Sep 2020 09:00) (13 - 31)  SpO2: 77% (26 Sep 2020 09:00) (68% - 100%)      20 @ 07:01  -  20 @ 07:00  --------------------------------------------------------  IN: 2260 mL / OUT: 3040 mL / NET: -780 mL            LABS:                        11.5   12.89 )-----------( 241      ( 26 Sep 2020 04:58 )             35.9         140  |  102  |  37<H>  ----------------------------<  228<H>  5.1   |  34<H>  |  1.30    Ca    9.4      26 Sep 2020 04:57  Phos  3.6       Mg     2.5         TPro  6.3  /  Alb  3.0<L>  /  TBili  0.3  /  DBili  x   /  AST  12<L>  /  ALT  18  /  AlkPhos  81            ABG - ( 24 Sep 2020 18:28 )  pH, Arterial: 7.36  pH, Blood: x     /  pCO2: 53    /  pO2: 94    / HCO3: 27    / Base Excess: 4.1   /  SaO2: 97                  WBC:  WBC Count: 12.89 K/uL ( @ 04:58)  WBC Count: 15.17 K/uL ( @ 05:52)  WBC Count: 15.53 K/uL ( @ 05:06)  WBC Count: 14.65 K/uL ( @ 22:04)      MICROBIOLOGY:  RECENT CULTURES:   .Sputum Sputum XXXX   Moderate polymorphonuclear leukocytes per low power field  Rare Squamous epithelial cells per low power field  Few Gram Negative Rods per oil power field   Moderate Stenotrophomonas maltophilia  Normal Respiratory Maria Esther present     .Urine Catheterized XXXX XXXX   No growth     .Blood Blood-Peripheral XXXX XXXX   No growth to date.                    Sodium:  Sodium, Serum: 140 mmol/L ( @ 04:57)  Sodium, Serum: 140 mmol/L ( @ 05:52)  Sodium, Serum: 139 mmol/L ( @ 05:06)  Sodium, Serum: 142 mmol/L ( @ 22:04)      1.30 mg/dL  @ 04:57  1.50 mg/dL  @ 05:52  1.80 mg/dL  @ 05:06  1.20 mg/dL  @ 22:04      Hemoglobin:  Hemoglobin: 11.5 g/dL ( @ 04:58)  Hemoglobin: 10.6 g/dL ( @ 05:52)  Hemoglobin: 13.3 g/dL ( @ 05:06)  Hemoglobin: 13.7 g/dL ( @ 22:04)      Platelets: Platelet Count - Automated: 241 K/uL ( @ 04:58)  Platelet Count - Automated: 223 K/uL ( @ 05:52)  Platelet Count - Automated: 259 K/uL ( @ 05:06)  Platelet Count - Automated: 300 K/uL ( @ 22:04)      LIVER FUNCTIONS - ( 25 Sep 2020 05:52 )  Alb: 3.0 g/dL / Pro: 6.3 g/dL / ALK PHOS: 81 U/L / ALT: 18 U/L / AST: 12 U/L / GGT: x                 RADIOLOGY & ADDITIONAL STUDIES:

## 2020-09-26 NOTE — PROGRESS NOTE ADULT - PROBLEM SELECTOR PLAN 3
Chronic  w/ 3v CABG 2004, s/p 2 recent coronary stents at [place]  -resume home Plavix when able

## 2020-09-26 NOTE — DISCHARGE NOTE PROVIDER - NSDCFUSCHEDAPPT_GEN_ALL_CORE_FT
YUE COTTO ; 10/12/2020 ; NPP Surg [facility] [address] ; 10/12/2020 ; N[facility] Surg [address] [address] ; 12/07/2020 ; P Surg Vasc [address] ; 12/07/2020 ; [facility] Surg [facility]asc [address] ; 12/07/2020 ; NPP Surg Vasc [address]

## 2020-09-26 NOTE — PROGRESS NOTE ADULT - PROVIDER SPECIALTY LIST ADULT
Critical Care
Hospitalist
Pulmonology

## 2020-09-30 ENCOUNTER — APPOINTMENT (OUTPATIENT)
Dept: PULMONOLOGY | Facility: CLINIC | Age: 80
End: 2020-09-30
Payer: MEDICARE

## 2020-09-30 VITALS
WEIGHT: 226 LBS | OXYGEN SATURATION: 99 % | BODY MASS INDEX: 32.35 KG/M2 | TEMPERATURE: 97.4 F | DIASTOLIC BLOOD PRESSURE: 76 MMHG | RESPIRATION RATE: 16 BRPM | HEIGHT: 70 IN | SYSTOLIC BLOOD PRESSURE: 127 MMHG | HEART RATE: 91 BPM

## 2020-09-30 PROCEDURE — 99214 OFFICE O/P EST MOD 30 MIN: CPT

## 2020-09-30 RX ORDER — GLIPIZIDE 2.5 MG/1
2.5 TABLET, FILM COATED, EXTENDED RELEASE ORAL
Qty: 90 | Refills: 0 | Status: DISCONTINUED | COMMUNITY
Start: 2020-03-30 | End: 2020-09-30

## 2020-09-30 RX ORDER — BUDESONIDE 0.5 MG/2ML
0.5 INHALANT ORAL
Qty: 120 | Refills: 2 | Status: DISCONTINUED | COMMUNITY
Start: 2020-04-17 | End: 2020-09-30

## 2020-09-30 RX ORDER — PREDNISONE 1 MG/1
1 TABLET ORAL
Qty: 100 | Refills: 0 | Status: DISCONTINUED | COMMUNITY
Start: 2020-03-17 | End: 2020-09-30

## 2020-09-30 RX ORDER — CEFUROXIME AXETIL 500 MG/1
500 TABLET ORAL
Qty: 8 | Refills: 0 | Status: DISCONTINUED | COMMUNITY
Start: 2020-03-09 | End: 2020-09-30

## 2020-09-30 RX ORDER — METOPROLOL TARTRATE 25 MG/1
25 TABLET, FILM COATED ORAL
Qty: 90 | Refills: 0 | Status: DISCONTINUED | COMMUNITY
Start: 2020-07-06 | End: 2020-09-30

## 2020-09-30 RX ORDER — ATORVASTATIN CALCIUM 40 MG/1
40 TABLET, FILM COATED ORAL
Qty: 90 | Refills: 0 | Status: DISCONTINUED | COMMUNITY
Start: 2020-03-25 | End: 2020-09-30

## 2020-09-30 RX ORDER — ROSUVASTATIN CALCIUM 20 MG/1
20 TABLET, FILM COATED ORAL
Refills: 0 | Status: ACTIVE | COMMUNITY

## 2020-09-30 RX ORDER — ATORVASTATIN CALCIUM 80 MG/1
TABLET, FILM COATED ORAL
Refills: 0 | Status: DISCONTINUED | COMMUNITY
End: 2020-09-30

## 2020-09-30 RX ORDER — PREDNISONE 10 MG/1
10 TABLET ORAL
Qty: 18 | Refills: 0 | Status: DISCONTINUED | COMMUNITY
Start: 2020-08-25 | End: 2020-09-30

## 2020-09-30 RX ORDER — GLIPIZIDE 2.5 MG/1
TABLET ORAL
Refills: 0 | Status: DISCONTINUED | COMMUNITY
End: 2020-09-30

## 2020-10-03 NOTE — H&P ADULT - REASON FOR ADMISSION
sent from vascular surgeon's office for tachycardia and hypotension and chest pain

## 2020-10-12 ENCOUNTER — APPOINTMENT (OUTPATIENT)
Dept: VASCULAR SURGERY | Facility: CLINIC | Age: 80
End: 2020-10-12
Payer: MEDICARE

## 2020-10-12 VITALS — TEMPERATURE: 97.3 F

## 2020-10-12 PROCEDURE — 99213 OFFICE O/P EST LOW 20 MIN: CPT

## 2020-10-12 PROCEDURE — 93926 LOWER EXTREMITY STUDY: CPT

## 2020-10-12 NOTE — ASSESSMENT
[FreeTextEntry1] : Patient with peripheral vascular disease, \par Right popliteal artery beyond the stent at the short segment occlusion.  Good flow through the stent.   Continue conservative management.  Follow-up in 2 months.

## 2020-10-21 NOTE — ASSESSMENT
[FreeTextEntry1] : Mr. 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 He was recently hospitalized at  ICU in September 2020 with dyspnea and AMS, found to have acute on chronic hypercapnic respiratory failure requiring intubation with sputum culture growing Stenotrophomonas maltophila s/p course of levofloxacin and completing prednisone taper.\par \par 1. 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 There is severely reduced diffusing capacity. Needs repeat PFTs prior to next visit\par - ABG in Sept 2020 prior to extubation on pressure support 7.36/53/94/27/97%. Repeat ABG on room air\par - Resolved eosinophilia with mepolizumab and prednisone. IgE previously elevated to 118 in Dec 2019 with allergy testing positive for house dust. Aspergillus IgE negative. Alpha-1 antitrypsin normal and HIV negative\par - Given bronchial hyperreactivity and peripheral eosinophilia, I suspect Asthma-COPD Overlap Syndrome\par - Continue monthly Mepolizumab injections\par - Slow prednisone taper until he is down to 5 mg daily, then follow-up\par - Continue with Breo Ellipta 200-25 mcg once daily (rinse after use) + Incruse Ellipta 62.5 mcg daily\par - Continue montelukast 10 mg at bedtime\par - Will consider long term azithromcyin 250 MWF if continues to have exacerbations\par - Ventolin prn with spacer\par - Keep off roflumilast given no chronic bronchitis symptoms\par - Restart pulmonary rehab\par - Recommend daily regular exercise. Can take a walk outside in the early morning and evening, increase endurance as tolerates. Also has a bike and treadmill in the house that he can use\par - Continue breathing exercises and low carbohydrate diet \par - Reconsider BLVR if RV remains >200% and ABG improved\par \par 2. Chronic hypoxemia and hypercapnic respiratory failure:\par - Likely due to asthma/COPD\par - Continue supplemental oxygen with nasal cannula 2-3 LPM with exercise\par - BiPAP every night, IPAP 18, EPAP 8\par - 6MWD is 132 meters (but he had to use the bathroom). Exercise oximetry required 2 LPM with exercise\par \par 3. Resolved left lower lobe pneumonia:\par - CT with noted resolution of LLL pneumonia, now with residual atelectasis\par - New atelectasis at the right base\par - Repeat CT chest in early 2021\par \par 4. Bilateral leg edema:\par - Now improved after diuretics given in the hospital\par - Likely related to high salt diet vs. diastolic dysfunction. LV systolic function normal and there is no significant valvular disease\par - Continue low salt diet. Keep legs elevated while sitting down. Compression stockings\par - No evidence of pulmonary hypertension on recent 2D-echo in January 2020. IVC dilated to 2.6 cm, but collapsible on 2D-echo in Sept 2020 during ICU stay (on vent)\par \par 5. Upper airway cough syndrome:\par - Continue fluticasone nasal spray, 1-2 sprays per nostril twice daily\par \par 6. HCM:\par - Up-to-date with pneumococcal vaccination\par - Received influenza vaccination during recent hospitalization\par - Self-injecting Mepolizumab at home\par \par 7. Follow-up in 1-2 months or sooner prn

## 2020-10-21 NOTE — REVIEW OF SYSTEMS
[Dyspnea] : dyspnea [Hypertension] : ~T hypertension [Edema] : ~T edema was present [Diabetes] : diabetes mellitus [Negative] : Sleep Disorder [Fever] : no fever [Chills] : no chills [Fatigue] : no fatigue [Cough] : no cough [Wheezing] : no wheezing

## 2020-10-21 NOTE — PHYSICAL EXAM
[General Appearance - Well Developed] : well developed [Well Groomed] : well groomed [Normal Appearance] : normal appearance [General Appearance - Well Nourished] : well nourished [General Appearance - In No Acute Distress] : no acute distress [Normal Conjunctiva] : the conjunctiva exhibited no abnormalities [Normal Oropharynx] : normal oropharynx [Neck Appearance] : the appearance of the neck was normal [Neck Cervical Mass (___cm)] : no neck mass was observed [Jugular Venous Distention Increased] : there was no jugular-venous distention [Heart Rate And Rhythm] : heart rate and rhythm were normal [Heart Sounds] : normal S1 and S2 [Murmurs] : no murmurs present [Arterial Pulses Normal] : the arterial pulses were normal [Exaggerated Use Of Accessory Muscles For Inspiration] : no accessory muscle use [Respiration, Rhythm And Depth] : normal respiratory rhythm and effort [Abdomen Soft] : soft [Abdomen Tenderness] : non-tender [Abnormal Walk] : normal gait [Cranial Nerves] : cranial nerves 2-12 were intact [Cyanosis, Localized] : no localized cyanosis [Nail Clubbing] : no clubbing of the fingernails [Oriented To Time, Place, And Person] : oriented to person, place, and time [Motor Exam] : the motor exam was normal [Skin Color & Pigmentation] : normal skin color and pigmentation [Affect] : the affect was normal [Mood] : the mood was normal [] : no rash [Skin Lesions] : no skin lesions [FreeTextEntry1] : decreased air entry bilaterally; end-inspiratory squeaks auscultated anteriorly; no wheezing auscultated
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
## 2020-10-27 ENCOUNTER — INPATIENT (INPATIENT)
Facility: HOSPITAL | Age: 80
LOS: 1 days | Discharge: HOME | DRG: 189 | End: 2020-10-29
Attending: INTERNAL MEDICINE | Admitting: INTERNAL MEDICINE
Payer: COMMERCIAL

## 2020-10-27 VITALS
OXYGEN SATURATION: 99 % | WEIGHT: 225.09 LBS | RESPIRATION RATE: 36 BRPM | HEART RATE: 138 BPM | DIASTOLIC BLOOD PRESSURE: 103 MMHG | TEMPERATURE: 98 F | SYSTOLIC BLOOD PRESSURE: 198 MMHG | HEIGHT: 78 IN

## 2020-10-27 DIAGNOSIS — R06.03 ACUTE RESPIRATORY DISTRESS: ICD-10-CM

## 2020-10-27 DIAGNOSIS — T14.8XXA OTHER INJURY OF UNSPECIFIED BODY REGION, INITIAL ENCOUNTER: Chronic | ICD-10-CM

## 2020-10-27 DIAGNOSIS — Z95.5 PRESENCE OF CORONARY ANGIOPLASTY IMPLANT AND GRAFT: Chronic | ICD-10-CM

## 2020-10-27 LAB
ALBUMIN SERPL ELPH-MCNC: 3.7 G/DL — SIGNIFICANT CHANGE UP (ref 3.3–5)
ALP SERPL-CCNC: 93 U/L — SIGNIFICANT CHANGE UP (ref 40–120)
ALT FLD-CCNC: 23 U/L — SIGNIFICANT CHANGE UP (ref 12–78)
ANION GAP SERPL CALC-SCNC: 3 MMOL/L — LOW (ref 5–17)
APTT BLD: 29.6 SEC — SIGNIFICANT CHANGE UP (ref 27.5–35.5)
AST SERPL-CCNC: 34 U/L — SIGNIFICANT CHANGE UP (ref 15–37)
BASE EXCESS BLDA CALC-SCNC: 2.4 MMOL/L — HIGH (ref -2–2)
BASE EXCESS BLDA CALC-SCNC: 3.1 MMOL/L — HIGH (ref -2–2)
BASE EXCESS BLDA CALC-SCNC: 5.6 MMOL/L — HIGH (ref -2–2)
BASOPHILS # BLD AUTO: 0.04 K/UL — SIGNIFICANT CHANGE UP (ref 0–0.2)
BASOPHILS NFR BLD AUTO: 0.3 % — SIGNIFICANT CHANGE UP (ref 0–2)
BILIRUB SERPL-MCNC: 0.5 MG/DL — SIGNIFICANT CHANGE UP (ref 0.2–1.2)
BLOOD GAS COMMENTS ARTERIAL: SIGNIFICANT CHANGE UP
BUN SERPL-MCNC: 17 MG/DL — SIGNIFICANT CHANGE UP (ref 7–23)
CALCIUM SERPL-MCNC: 9.4 MG/DL — SIGNIFICANT CHANGE UP (ref 8.5–10.1)
CHLORIDE SERPL-SCNC: 103 MMOL/L — SIGNIFICANT CHANGE UP (ref 96–108)
CO2 SERPL-SCNC: 33 MMOL/L — HIGH (ref 22–31)
CREAT SERPL-MCNC: 1.4 MG/DL — HIGH (ref 0.5–1.3)
CRP SERPL-MCNC: 0.46 MG/DL — HIGH (ref 0–0.4)
D DIMER BLD IA.RAPID-MCNC: 151 NG/ML DDU — SIGNIFICANT CHANGE UP
EOSINOPHIL # BLD AUTO: 0.06 K/UL — SIGNIFICANT CHANGE UP (ref 0–0.5)
EOSINOPHIL NFR BLD AUTO: 0.5 % — SIGNIFICANT CHANGE UP (ref 0–6)
FERRITIN SERPL-MCNC: 86 NG/ML — SIGNIFICANT CHANGE UP (ref 30–400)
GLUCOSE SERPL-MCNC: 216 MG/DL — HIGH (ref 70–99)
HCO3 BLDA-SCNC: 25 MMOL/L — SIGNIFICANT CHANGE UP (ref 23–27)
HCO3 BLDA-SCNC: 26 MMOL/L — SIGNIFICANT CHANGE UP (ref 23–27)
HCO3 BLDA-SCNC: 27 MMOL/L — SIGNIFICANT CHANGE UP (ref 23–27)
HCT VFR BLD CALC: 42.1 % — SIGNIFICANT CHANGE UP (ref 39–50)
HGB BLD-MCNC: 13.4 G/DL — SIGNIFICANT CHANGE UP (ref 13–17)
HOROWITZ INDEX BLDA+IHG-RTO: 30 — SIGNIFICANT CHANGE UP
HOROWITZ INDEX BLDA+IHG-RTO: 30 — SIGNIFICANT CHANGE UP
HOROWITZ INDEX BLDA+IHG-RTO: 35 — SIGNIFICANT CHANGE UP
IMM GRANULOCYTES NFR BLD AUTO: 0.6 % — SIGNIFICANT CHANGE UP (ref 0–1.5)
INR BLD: 0.95 RATIO — SIGNIFICANT CHANGE UP (ref 0.88–1.16)
LACTATE SERPL-SCNC: 0.9 MMOL/L — SIGNIFICANT CHANGE UP (ref 0.7–2)
LYMPHOCYTES # BLD AUTO: 17.1 % — SIGNIFICANT CHANGE UP (ref 13–44)
LYMPHOCYTES # BLD AUTO: 2.03 K/UL — SIGNIFICANT CHANGE UP (ref 1–3.3)
MCHC RBC-ENTMCNC: 28.5 PG — SIGNIFICANT CHANGE UP (ref 27–34)
MCHC RBC-ENTMCNC: 31.8 GM/DL — LOW (ref 32–36)
MCV RBC AUTO: 89.6 FL — SIGNIFICANT CHANGE UP (ref 80–100)
MONOCYTES # BLD AUTO: 1.25 K/UL — HIGH (ref 0–0.9)
MONOCYTES NFR BLD AUTO: 10.5 % — SIGNIFICANT CHANGE UP (ref 2–14)
NEUTROPHILS # BLD AUTO: 8.45 K/UL — HIGH (ref 1.8–7.4)
NEUTROPHILS NFR BLD AUTO: 71 % — SIGNIFICANT CHANGE UP (ref 43–77)
NRBC # BLD: 0 /100 WBCS — SIGNIFICANT CHANGE UP (ref 0–0)
NT-PROBNP SERPL-SCNC: 176 PG/ML — SIGNIFICANT CHANGE UP (ref 0–450)
PCO2 BLDA: 57 MMHG — HIGH (ref 32–46)
PCO2 BLDA: 71 MMHG — CRITICAL HIGH (ref 32–46)
PCO2 BLDA: 81 MMHG — CRITICAL HIGH (ref 32–46)
PH BLDA: 7.23 — LOW (ref 7.35–7.45)
PH BLDA: 7.24 — LOW (ref 7.35–7.45)
PH BLDA: 7.32 — LOW (ref 7.35–7.45)
PLATELET # BLD AUTO: 227 K/UL — SIGNIFICANT CHANGE UP (ref 150–400)
PO2 BLDA: 172 MMHG — HIGH (ref 74–108)
PO2 BLDA: 73 MMHG — LOW (ref 74–108)
PO2 BLDA: 95 MMHG — SIGNIFICANT CHANGE UP (ref 74–108)
POTASSIUM SERPL-MCNC: 4.8 MMOL/L — SIGNIFICANT CHANGE UP (ref 3.5–5.3)
POTASSIUM SERPL-SCNC: 4.8 MMOL/L — SIGNIFICANT CHANGE UP (ref 3.5–5.3)
PROCALCITONIN SERPL-MCNC: <0.05 — SIGNIFICANT CHANGE UP (ref 0–0.04)
PROT SERPL-MCNC: 7.5 G/DL — SIGNIFICANT CHANGE UP (ref 6–8.3)
PROTHROM AB SERPL-ACNC: 11.1 SEC — SIGNIFICANT CHANGE UP (ref 10.6–13.6)
RBC # BLD: 4.7 M/UL — SIGNIFICANT CHANGE UP (ref 4.2–5.8)
RBC # FLD: 13.9 % — SIGNIFICANT CHANGE UP (ref 10.3–14.5)
SAO2 % BLDA: 94 % — SIGNIFICANT CHANGE UP (ref 92–96)
SAO2 % BLDA: 96 % — SIGNIFICANT CHANGE UP (ref 92–96)
SAO2 % BLDA: 99 % — HIGH (ref 92–96)
SARS-COV-2 RNA SPEC QL NAA+PROBE: SIGNIFICANT CHANGE UP
SODIUM SERPL-SCNC: 139 MMOL/L — SIGNIFICANT CHANGE UP (ref 135–145)
TROPONIN I SERPL-MCNC: <.015 NG/ML — SIGNIFICANT CHANGE UP (ref 0.01–0.04)
WBC # BLD: 11.9 K/UL — HIGH (ref 3.8–10.5)
WBC # FLD AUTO: 11.9 K/UL — HIGH (ref 3.8–10.5)

## 2020-10-27 PROCEDURE — 99291 CRITICAL CARE FIRST HOUR: CPT

## 2020-10-27 PROCEDURE — 99285 EMERGENCY DEPT VISIT HI MDM: CPT

## 2020-10-27 PROCEDURE — 93010 ELECTROCARDIOGRAM REPORT: CPT | Mod: 76

## 2020-10-27 PROCEDURE — 71045 X-RAY EXAM CHEST 1 VIEW: CPT | Mod: 26

## 2020-10-27 PROCEDURE — 99223 1ST HOSP IP/OBS HIGH 75: CPT | Mod: AI

## 2020-10-27 RX ORDER — ATORVASTATIN CALCIUM 80 MG/1
20 TABLET, FILM COATED ORAL AT BEDTIME
Refills: 0 | Status: DISCONTINUED | OUTPATIENT
Start: 2020-10-27 | End: 2020-10-29

## 2020-10-27 RX ORDER — FLUTICASONE FUROATE AND VILANTEROL TRIFENATATE 100; 25 UG/1; UG/1
0 POWDER RESPIRATORY (INHALATION)
Qty: 0 | Refills: 0 | DISCHARGE

## 2020-10-27 RX ORDER — INSULIN LISPRO 100/ML
VIAL (ML) SUBCUTANEOUS AT BEDTIME
Refills: 0 | Status: DISCONTINUED | OUTPATIENT
Start: 2020-10-27 | End: 2020-10-28

## 2020-10-27 RX ORDER — DEXTROSE 50 % IN WATER 50 %
25 SYRINGE (ML) INTRAVENOUS ONCE
Refills: 0 | Status: DISCONTINUED | OUTPATIENT
Start: 2020-10-27 | End: 2020-10-29

## 2020-10-27 RX ORDER — IPRATROPIUM/ALBUTEROL SULFATE 18-103MCG
3 AEROSOL WITH ADAPTER (GRAM) INHALATION ONCE
Refills: 0 | Status: COMPLETED | OUTPATIENT
Start: 2020-10-27 | End: 2020-10-27

## 2020-10-27 RX ORDER — BUDESONIDE AND FORMOTEROL FUMARATE DIHYDRATE 160; 4.5 UG/1; UG/1
2 AEROSOL RESPIRATORY (INHALATION)
Refills: 0 | Status: DISCONTINUED | OUTPATIENT
Start: 2020-10-27 | End: 2020-10-29

## 2020-10-27 RX ORDER — MONTELUKAST 4 MG/1
10 TABLET, CHEWABLE ORAL AT BEDTIME
Refills: 0 | Status: DISCONTINUED | OUTPATIENT
Start: 2020-10-27 | End: 2020-10-29

## 2020-10-27 RX ORDER — LOSARTAN POTASSIUM 100 MG/1
25 TABLET, FILM COATED ORAL DAILY
Refills: 0 | Status: DISCONTINUED | OUTPATIENT
Start: 2020-10-27 | End: 2020-10-29

## 2020-10-27 RX ORDER — INSULIN LISPRO 100/ML
VIAL (ML) SUBCUTANEOUS
Refills: 0 | Status: DISCONTINUED | OUTPATIENT
Start: 2020-10-27 | End: 2020-10-28

## 2020-10-27 RX ORDER — SODIUM CHLORIDE 9 MG/ML
1000 INJECTION, SOLUTION INTRAVENOUS
Refills: 0 | Status: DISCONTINUED | OUTPATIENT
Start: 2020-10-27 | End: 2020-10-29

## 2020-10-27 RX ORDER — ENOXAPARIN SODIUM 100 MG/ML
40 INJECTION SUBCUTANEOUS DAILY
Refills: 0 | Status: DISCONTINUED | OUTPATIENT
Start: 2020-10-27 | End: 2020-10-29

## 2020-10-27 RX ORDER — MONTELUKAST 4 MG/1
1 TABLET, CHEWABLE ORAL
Qty: 0 | Refills: 0 | DISCHARGE

## 2020-10-27 RX ORDER — FLUTICASONE PROPIONATE 50 MCG
1 SPRAY, SUSPENSION NASAL
Refills: 0 | Status: DISCONTINUED | OUTPATIENT
Start: 2020-10-27 | End: 2020-10-29

## 2020-10-27 RX ORDER — INSULIN LISPRO 100/ML
5 VIAL (ML) SUBCUTANEOUS
Refills: 0 | Status: DISCONTINUED | OUTPATIENT
Start: 2020-10-27 | End: 2020-10-29

## 2020-10-27 RX ORDER — TAMSULOSIN HYDROCHLORIDE 0.4 MG/1
0.4 CAPSULE ORAL AT BEDTIME
Refills: 0 | Status: DISCONTINUED | OUTPATIENT
Start: 2020-10-27 | End: 2020-10-27

## 2020-10-27 RX ORDER — INSULIN GLARGINE 100 [IU]/ML
15 INJECTION, SOLUTION SUBCUTANEOUS AT BEDTIME
Refills: 0 | Status: DISCONTINUED | OUTPATIENT
Start: 2020-10-27 | End: 2020-10-29

## 2020-10-27 RX ORDER — CLOPIDOGREL BISULFATE 75 MG/1
75 TABLET, FILM COATED ORAL DAILY
Refills: 0 | Status: DISCONTINUED | OUTPATIENT
Start: 2020-10-27 | End: 2020-10-29

## 2020-10-27 RX ORDER — AZITHROMYCIN 500 MG/1
500 TABLET, FILM COATED ORAL ONCE
Refills: 0 | Status: COMPLETED | OUTPATIENT
Start: 2020-10-27 | End: 2020-10-27

## 2020-10-27 RX ORDER — DEXTROSE 50 % IN WATER 50 %
15 SYRINGE (ML) INTRAVENOUS ONCE
Refills: 0 | Status: DISCONTINUED | OUTPATIENT
Start: 2020-10-27 | End: 2020-10-29

## 2020-10-27 RX ORDER — METOPROLOL TARTRATE 50 MG
12.5 TABLET ORAL
Refills: 0 | Status: DISCONTINUED | OUTPATIENT
Start: 2020-10-27 | End: 2020-10-29

## 2020-10-27 RX ORDER — METOPROLOL TARTRATE 50 MG
12.5 TABLET ORAL
Qty: 0 | Refills: 0 | DISCHARGE

## 2020-10-27 RX ORDER — CEFTRIAXONE 500 MG/1
1000 INJECTION, POWDER, FOR SOLUTION INTRAMUSCULAR; INTRAVENOUS EVERY 24 HOURS
Refills: 0 | Status: DISCONTINUED | OUTPATIENT
Start: 2020-10-27 | End: 2020-10-28

## 2020-10-27 RX ORDER — METOPROLOL TARTRATE 50 MG
12.5 TABLET ORAL
Refills: 0 | Status: DISCONTINUED | OUTPATIENT
Start: 2020-10-27 | End: 2020-10-27

## 2020-10-27 RX ORDER — ALBUTEROL 90 UG/1
2 AEROSOL, METERED ORAL EVERY 6 HOURS
Refills: 0 | Status: DISCONTINUED | OUTPATIENT
Start: 2020-10-27 | End: 2020-10-27

## 2020-10-27 RX ORDER — TIOTROPIUM BROMIDE 18 UG/1
1 CAPSULE ORAL; RESPIRATORY (INHALATION) DAILY
Refills: 0 | Status: DISCONTINUED | OUTPATIENT
Start: 2020-10-27 | End: 2020-10-29

## 2020-10-27 RX ORDER — LEVOFLOXACIN 750 MG/1
750 TABLET, FILM COATED ORAL
Qty: 4 | Refills: 0 | Status: DISCONTINUED | COMMUNITY
Start: 2020-09-26 | End: 2020-10-27

## 2020-10-27 RX ORDER — GLUCAGON INJECTION, SOLUTION 0.5 MG/.1ML
1 INJECTION, SOLUTION SUBCUTANEOUS ONCE
Refills: 0 | Status: DISCONTINUED | OUTPATIENT
Start: 2020-10-27 | End: 2020-10-29

## 2020-10-27 RX ORDER — IPRATROPIUM/ALBUTEROL SULFATE 18-103MCG
3 AEROSOL WITH ADAPTER (GRAM) INHALATION EVERY 4 HOURS
Refills: 0 | Status: DISCONTINUED | OUTPATIENT
Start: 2020-10-27 | End: 2020-10-28

## 2020-10-27 RX ORDER — AZITHROMYCIN 500 MG/1
TABLET, FILM COATED ORAL
Refills: 0 | Status: DISCONTINUED | OUTPATIENT
Start: 2020-10-27 | End: 2020-10-28

## 2020-10-27 RX ORDER — DEXTROSE 50 % IN WATER 50 %
12.5 SYRINGE (ML) INTRAVENOUS ONCE
Refills: 0 | Status: DISCONTINUED | OUTPATIENT
Start: 2020-10-27 | End: 2020-10-29

## 2020-10-27 RX ORDER — TAMSULOSIN HYDROCHLORIDE 0.4 MG/1
1 CAPSULE ORAL
Qty: 0 | Refills: 0 | DISCHARGE

## 2020-10-27 RX ORDER — AZITHROMYCIN 500 MG/1
500 TABLET, FILM COATED ORAL EVERY 24 HOURS
Refills: 0 | Status: DISCONTINUED | OUTPATIENT
Start: 2020-10-28 | End: 2020-10-28

## 2020-10-27 RX ORDER — METFORMIN HYDROCHLORIDE 850 MG/1
1000 TABLET ORAL
Refills: 0 | Status: DISCONTINUED | OUTPATIENT
Start: 2020-10-27 | End: 2020-10-27

## 2020-10-27 RX ORDER — TAMSULOSIN HYDROCHLORIDE 0.4 MG/1
0.4 CAPSULE ORAL AT BEDTIME
Refills: 0 | Status: DISCONTINUED | OUTPATIENT
Start: 2020-10-27 | End: 2020-10-29

## 2020-10-27 RX ORDER — ROSUVASTATIN CALCIUM 5 MG/1
1 TABLET ORAL
Qty: 0 | Refills: 0 | DISCHARGE

## 2020-10-27 RX ORDER — ASPIRIN/CALCIUM CARB/MAGNESIUM 324 MG
81 TABLET ORAL DAILY
Refills: 0 | Status: DISCONTINUED | OUTPATIENT
Start: 2020-10-27 | End: 2020-10-29

## 2020-10-27 RX ORDER — INSULIN ASPART 100 [IU]/ML
5 INJECTION, SOLUTION SUBCUTANEOUS
Qty: 0 | Refills: 0 | DISCHARGE

## 2020-10-27 RX ADMIN — Medication 3: at 19:14

## 2020-10-27 RX ADMIN — Medication 12.5 MILLIGRAM(S): at 18:21

## 2020-10-27 RX ADMIN — AZITHROMYCIN 500 MILLIGRAM(S): 500 TABLET, FILM COATED ORAL at 10:50

## 2020-10-27 RX ADMIN — Medication 81 MILLIGRAM(S): at 12:20

## 2020-10-27 RX ADMIN — Medication 2: at 23:19

## 2020-10-27 RX ADMIN — Medication 3 MILLILITER(S): at 06:31

## 2020-10-27 RX ADMIN — CLOPIDOGREL BISULFATE 75 MILLIGRAM(S): 75 TABLET, FILM COATED ORAL at 12:20

## 2020-10-27 RX ADMIN — TAMSULOSIN HYDROCHLORIDE 0.4 MILLIGRAM(S): 0.4 CAPSULE ORAL at 22:03

## 2020-10-27 RX ADMIN — TIOTROPIUM BROMIDE 1 CAPSULE(S): 18 CAPSULE ORAL; RESPIRATORY (INHALATION) at 18:22

## 2020-10-27 RX ADMIN — MONTELUKAST 10 MILLIGRAM(S): 4 TABLET, CHEWABLE ORAL at 22:03

## 2020-10-27 RX ADMIN — Medication 3 MILLILITER(S): at 16:06

## 2020-10-27 RX ADMIN — Medication 1 SPRAY(S): at 19:14

## 2020-10-27 RX ADMIN — Medication 3 MILLILITER(S): at 23:41

## 2020-10-27 RX ADMIN — ENOXAPARIN SODIUM 40 MILLIGRAM(S): 100 INJECTION SUBCUTANEOUS at 12:20

## 2020-10-27 RX ADMIN — Medication 40 MILLIGRAM(S): at 13:39

## 2020-10-27 RX ADMIN — ATORVASTATIN CALCIUM 20 MILLIGRAM(S): 80 TABLET, FILM COATED ORAL at 22:03

## 2020-10-27 RX ADMIN — INSULIN GLARGINE 15 UNIT(S): 100 INJECTION, SOLUTION SUBCUTANEOUS at 23:19

## 2020-10-27 RX ADMIN — Medication 125 MILLIGRAM(S): at 06:15

## 2020-10-27 RX ADMIN — LOSARTAN POTASSIUM 25 MILLIGRAM(S): 100 TABLET, FILM COATED ORAL at 12:20

## 2020-10-27 RX ADMIN — Medication 40 MILLIGRAM(S): at 18:26

## 2020-10-27 RX ADMIN — BUDESONIDE AND FORMOTEROL FUMARATE DIHYDRATE 2 PUFF(S): 160; 4.5 AEROSOL RESPIRATORY (INHALATION) at 18:37

## 2020-10-27 RX ADMIN — Medication 3 MILLILITER(S): at 19:47

## 2020-10-27 RX ADMIN — CEFTRIAXONE 100 MILLIGRAM(S): 500 INJECTION, POWDER, FOR SOLUTION INTRAMUSCULAR; INTRAVENOUS at 12:25

## 2020-10-27 NOTE — H&P ADULT - ASSESSMENT
79 yo M with PMH of COPD (On home O2 3L and BIPAP at while sleeping regardless of time of day), asthma, CAD (w/ 3v CABG 2004), s/p 2 recent coronary stents at [place], PVD s/p LLE stent (11/2019), HLD, DM2 (on Metformin), BPH, hx Hypercapnic Respiratory Failure/Cardiac Arrest requiring intubation (6/18), with multiple frequent admissions, last in 8/2020 for acute hypercapnic respiratory failure, COPD exacerbation presents with acute hypercapnic respiratory failure, intubated in the ED.

## 2020-10-27 NOTE — ED ADULT NURSE NOTE - BREATHING
For information on Fall & Injury Prevention, visit [email or web address] at rest

## 2020-10-27 NOTE — ED PROVIDER NOTE - CARE PLAN
Principal Discharge DX:	Acute respiratory distress  Secondary Diagnosis:	COPD (chronic obstructive pulmonary disease)

## 2020-10-27 NOTE — CONSULT NOTE ADULT - ASSESSMENT
bpap settings changed Spoke to rt 930a   cont rx bpap settings changed Spoke to rt 930a   cont rx        [facility] P [phone]  1940 DOA 10/27/2020 DR AMANDA MCCARTNEY           Initial evaluation/Pulmonary Critical Care consultation requested on 10/27/2020   by Dr Erlin Loredo   from Dr Dejesus   Patient examined chart reviewed    HOSPITAL ADMISSION   PATIENT CAME  FROM (if information available)      [facility] P [phone]  1940 DOA 10/27/2020 DR AMANDA MCCARTNEY      REVIEW OF SYMPTOMS      Able to give ROS  NO     PHYSICAL EXAM    HEENT Unremarkable PERRLA atraumatic   RESP Fair air entry EXP prolonged    Harsh breath sound Resp distres mild   CARDIAC S1 S2 No S3     NO JVD    ABDOMEN SOFT BS PRESENT NOT DISTENDED No hepatosplenomegaly PEDAL EDEMA present No calf tenderness  NO rash     PT DATA/BEST PRACTICE  ALLERGY   shellfish           WT                 10/27/2020 102 k              BMI                  10/27/2020 23                      ADVANCED DIRECTIVE     HEAD OF BED ELEVATION Yes      DVT PROPHYLAXIS.   lvnx 40 (10/27)      DIET. npo (10/27)                                                                            MCCORMICK PROPHYLAXIS.   INFECTION PPLX                                                    OXYGENATION.         IV F. VITALS.   10/27/2020 120 170/80   10/27/2020 11:42 AM BPAP /.3            LABS.   W 10/27/2020 w 11.9   Pr 10/27/2020 Pr negv   CO2 10/27/2020 CO2 33   d-dimr 10/27/2020 d-dimr 151   Tr 10/27/2020 Tr negv   Hb 10/27/2020 Hb 13.4   Plt 10/27/2020 plt 227   Na 10/27/2020 Na 139  Cr 10/27/2020 Cr 1.4            IMAGING.   CXR 10/27/2020 cxr sternotomy No ac infiltr     ECHO 2020 ECHO preserved lvsf dd1 ivc dilated but appears 50% collapse with respn             PATIENT SUMMARY.   79 m former smoker PMH HTN, DM2 (on home Metformin and glipizide), COPD (2L home/ BIPAP at night), CAD with 3v CABG 2004, Stent 2019 HLD, 10/26/2018 ECHO ef 55% hx hypercapnic resp failure with ho intubation c/b with cardiac arrest (2018) with ho recurrent admissions GOLD D last admitted 2020 required intubated  now admitted 10/27/2020 with resp distress with aoc type 2 resp failure sec to copd ex   Pulm consulted 10/27/2020     PROBLEM/ASSESSMENT/RECOMMENDATIONS.    ACUTE ON CHRONIC TYPE 2 RESP FAILUER POA 10/27/2020  Likel cause of aoc resp failure Type 2 is copd ex   Negv D-dimer makes vte less likely  Negv Procalc poa makes bacterial infection less likely Pt also has a negv cxr   10/27/2020 12:00 PM 20/6/.3 724/71/95  10/27/2020 8a bpap 14/5/.35 723/81/172   10/27/2020 12:01 PM Will increase setting to bpap 24/6/.3 bur 18 and repeat abg at 3p   10/27/2020 Will aslso get icu eval     COPD ex  10/27/2020  Added spiriva duoneb pulmicort   10/27/2020 When awake will change neb to mdi     RO RESP TRACT INFECTION  W 10/27/2020 w 11.9   Pr 10/27/2020 Pr negv   CXR 10/27/2020 cxr sternotomy No ac infiltr  A/R   10/27/2020 is on rocephin and azithro started by hospitalist  10/27/2020 Suggest deescalate to azithro alone as i doubt bacterial infection but at same time antibiotics should be used in severe execarbations so continue azithro which has anti inflammatory effect also     CAD   Initial trop negv    RO VTE   10/27/2020 d-dimr negv    TIME SPENT   Over 55 minutes aggregate care time spent on encounter; activities included   direct patient care, counseling and/or coordinating care reviewing notes, lab data/ imaging , discussion with multidisciplinary team/ patient  /family and explaining in detail risks, benefits, alternatives  of the recommendations     [facility]NEY[facility] YUE [facility] P [phone]  1940 DOA 10/27/2020 DR AMANDA MCCARTNEY

## 2020-10-27 NOTE — H&P ADULT - PROBLEM SELECTOR PLAN 1
with acute hypoxemic repsirtaory failure   on bipap  frequent abg  if not corrected will consult icu   continue on solumderol 40 mg ivp bid  duoneb  albuterol   will empercally start on rocephin/zithromax

## 2020-10-27 NOTE — CONSULT NOTE ADULT - ATTENDING COMMENTS
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 & chronic prednisone, former smoker, chronic hypoxemic and hypercapnic respiratory failure on home oxygen and nocturnal BiPAP, history of cardiac arrest in 2018 due to respiratory failure, HTN, HLD, DM2 (not on insulin), CAD s/p 3V-CABG (2004) and PCI (Oct 2019), PVD s/p bilateral LE stents (most recently Nov 2019) on plavix, BPH, and left femur fracture with OM s/p multiple surgeries who was recently hospitalized 1 month ago with acute on chronic hypercapnia requiring intubation, found to have Stenotrophomonas maltophila in sputum, now presents again with acute on chronic hypercapnic respiratory failure that has now improved with BiPAP 24/6. Has significant end-expiratory wheezing on exam.    - Continue methylprednisolone 40 mg IV q12h today, and decrease to prednisone 40 mg daily tomorrow with slow taper  - S/p Mepolizumab injection on 10/9  - Continue ceftriaxone and azithromycin for 5 days  - Albuterol-ipratropium nebs q4h  - Symbicort + Spiriva  - Continue BiPAP 24/6 30%, will need to adjust patient's home BiPAP with increased settings  - Supplemental oxygen with NC when off BiPAP, goal SpO2>90%  - Will likely need slower steroid taper upon discharge  - Consider long term azithromycin therapy 250-500 mg MWF upon discharge  - Continue montelukast 10 mg at bedtime  - Outpatient pulmonary rehab  - PT eval in AM  - Consider BLVR given recurrent exacerbations with residual volume on PFTs>200%  - H/o CAD + Diastolic dysfunction --> continue aspirin, clopidogrel, losartan, metoprolol, and statin  - Recent 2D-echo with normal LV systolic function, no significant valvular disease, no history of pulmonary hypertension  - Start diet in AM if respiratory status remains stable  - Stable CKD, monitor BUN/Cr, strict I/O's  - DVT ppx  - Discussed with wife Sania at bedside  CC time spent: 35 min
I personally saw and examined the patient in detail.  I have spoken to the above provider regarding the assessment and plan of care.  I reviewed the above assessment and plan of care, and agree.  I have made changes in the body of the note where appropriate.

## 2020-10-27 NOTE — PATIENT PROFILE ADULT - BILL PAYMENT
Amado Christine is a 47 y.o. female here for RUQ pain. Rapid assessment performed. --- Orders were placed. --- Patient will be waiting groom  Signed By: Jose Garber NP     October 8, 2017          Ambulatory to ed with RUQ pain onset three weeks ago. Nausea and vomiting with any food intake, vomited with no po intake. Vomited twice since 0300 - liquid emesis now. Felt hot and cold. Still sob and coughing after pnea diagnosis three weeks ago. hsx GERD and hiatal hernia.  pnea 3 weeks ago.     Skin warm and dry, appears moderately uncomfortable in triage  Unable to complete further assessment in triage    Will start diagnostics from triage  - see ct abd 8/26
Pt states abdominal pain and vomiting for the past three weeks. Pt states the pain is to the upper right. Pt states she vomits after eating.
no

## 2020-10-27 NOTE — H&P ADULT - NSHPREVIEWOFSYSTEMS_GEN_ALL_CORE
REVIEW OF SYSTEMS:  CONSTITUTIONAL: shortness of breath on boao  EYES: No eye pain, visual disturbances, or discharge  RESPIRATORY:wheezing  CARDIOVASCULAR: No chest pain, palpitations, dizziness, or leg swelling  GASTROINTESTINAL: No abdominal or epigastric pain. No nausea, vomiting, or hematemesis; No diarrhea or constipation. No melena or hematochezia.  GENITOURINARY: No dysuria, frequency, hematuria, or incontinence  NEUROLOGICAL: No headaches, memory loss, loss of strength, numbness, or tremors  SKIN: No itching, burning, rashes, or lesions   LYMPH NODES: No enlarged glands  ENDOCRINE: No heat or cold intolerance; No hair loss; No polydipsia or polyuria  MUSCULOSKELETAL: No joint pain or swelling; No muscle, back, or extremity pain  PSYCHIATRIC: No depression, anxiety, mood swings, or difficulty sleeping  HEME/LYMPH: No easy bruising, or bleeding gums  ALLERGY AND IMMUNOLOGIC: No hives or eczema

## 2020-10-27 NOTE — ED ADULT NURSE REASSESSMENT NOTE - NS ED NURSE REASSESS COMMENT FT1
Report received, care assumed at 0700. Patient resting. Bipap in place. Cardiac monitor in place. Patient appears comfortable. Patient wife at bedside. Denies needs at this time. Vital signs monitored. Plan of care discussed with patient. Comfort and safety maintained. Will continue to monitor.

## 2020-10-27 NOTE — H&P ADULT - PROBLEM SELECTOR PLAN 2
- acute on chronic   - bp elevated in ED >200 systolic. multifactorial- component of respiratory distress. now intubated and sedated. trend bp.   - on home lopressor 12.5mg BID  -monitor routine hemodynamics bp 175/5 with ,   will give caridzem 10 mg ivp

## 2020-10-27 NOTE — H&P ADULT - NSHPPHYSICALEXAM_GEN_ALL_CORE
PHYSICAL EXAM:  Vital Signs Last 24 Hrs  T(C): 36.6 (27 Oct 2020 06:01), Max: 36.6 (27 Oct 2020 06:01)  T(F): 97.8 (27 Oct 2020 06:01), Max: 97.8 (27 Oct 2020 06:01)  HR: 120 (27 Oct 2020 07:39) (120 - 138)  BP: 178/85 (27 Oct 2020 07:39) (178/85 - 198/103)  BP(mean): --  RR: 20 (27 Oct 2020 07:39) (20 - 36)  SpO2: 97% (27 Oct 2020 09:46) (97% - 100%)    GENERAL: dyspnea, wheezing b/l  HEAD:  Atraumatic, Normocephalic  EYES: EOMI, PERRLA, conjunctiva and sclera clear  ENMT: No tonsillar erythema, exudates, or enlargement; Moist mucous membranes, Good dentition, No lesions  NECK: Supple, No JVD, Normal thyroid  NERVOUS SYSTEM:  Alert & Oriented X3,   CHEST/LUNG: wheezing at the bases  HEART: Regular rate and rhythm; No murmurs, rubs, or gallops  ABDOMEN: Soft, Nontender, Nondistended; Bowel sounds present  EXTREMITIES:  2+ Peripheral Pulses, No clubbing, cyanosis, or edema  LYMPH: No lymphadenopathy noted  SKIN: No rashes or lesions

## 2020-10-27 NOTE — CONSULT NOTE ADULT - ASSESSMENT
81 y/o M w/ pmh of COPD (on home o2 3L and bipap qhs), eosinophilic asthma, cad (s/p cabg 2004), s/p 2 stents place at [place], D s/p LLE stents (11/2019(, hld, dm2, bph, previous cardiac arrest 2/2 to hypercarbic resp failure 2/2 to COPD exacerbation, presented to [facility] on 10/27/2020 for SOB pt was found to be in hypercarbic resp failure.    -Neuro: Mental status stable and currently protecting his airway otherwise no acute issues at this time  -Cardiac: CAD s/p stents cont plavix/asa, HF/htn cont lopressor/losartan, maintain MAP >65  -Resp: Acute Hypercarbic resp failure 2/2 to underlying COPD/eosinophilic asthma with improvement on bipap cont   24/6 at 30% fio2 repeat ABG, cont duonebs/steroids  -GI: NPO while on bipap, GI ppx w/ protonix  -Renal: mild MERRILL monitor for now, cont to monitor trend renal functions and alphonse whitfield played monitor I&O  -ID: Cont IV Rocephin/azithro emperically f/u on cx  -Heme: DVT ppx w/ lovenox  -Endo: DM cont metformin  -Dispo: Transfer to MICU, GOC discussed w/ patient and wife at bedside and is full code, pt seen and examined w/ dr. logan

## 2020-10-27 NOTE — H&P ADULT - NSHPLABSRESULTS_GEN_ALL_CORE
13.4   11.90 )-----------( 227      ( 27 Oct 2020 06:34 )             42.1       10-27    139  |  103  |  17  ----------------------------<  216<H>  4.8   |  33<H>  |  1.40<H>    Ca    9.4      27 Oct 2020 06:34    TPro  7.5  /  Alb  3.7  /  TBili  0.5  /  DBili  x   /  AST  34  /  ALT  23  /  AlkPhos  93  10-27          ABG - ( 27 Oct 2020 08:50 )  pH, Arterial: 7.23  pH, Blood: x     /  pCO2: 81    /  pO2: 172   / HCO3: 27    / Base Excess: 5.6   /  SaO2: 99                      PT/INR - ( 27 Oct 2020 06:34 )   PT: 11.1 sec;   INR: 0.95 ratio         PTT - ( 27 Oct 2020 06:34 )  PTT:29.6 sec    Lactate Trend  10-27 @ 06:34 Lactate:0.9       CARDIAC MARKERS ( 27 Oct 2020 06:34 )  <.015 ng/mL / x     / x     / x     / x            CAPILLARY BLOOD GLUCOSE

## 2020-10-27 NOTE — CONSULT NOTE ADULT - ASSESSMENT
81 yo M with PMH of COPD (On home O2 3L and BIPAP at while sleeping regardless of time of day), asthma, CAD (w/ 3v CABG 2004), s/p 2 recent coronary stents at [place], PVD s/p LLE stent (11/2019), HLD, DM2 (on Metformin and insulin while on steroid tapers), BPH, hx Hypercapnic Respiratory Failure/Cardiac Arrest requiring intubation (6/18), with multiple frequent admissions, last in 8/2020 Respiratory failure.  Presents with progressive worsening dyspnea. Found to have Acute hypercapnic respiratory failure 2/2 COPD exacerbation. Cardio consulted for r/o CHF    - Pt has hx of multiple admissions for acute hypercapnic respiratory, readmitted again for acute hypercapnic respiratory likely 2/2 COPD exacerbation rather than CHF  - No clear evidence of acute ischemia, trops negative x 1. will f/u second set  - No hx of CHF. BMP unimpressive.No meaningful evidence of volume overload on exam. Lungs clear on CXR  -  TTE 9/24/2020: preserved left ventricular systolic function. Doppler evidence of grade 1 diastolic dysfunction. The IVC appears dilated at 2.6 cm, though collapses >50% with respiration. No significant pericardial effusion    - /84 now 139/87 s/p losartan, continue home BP meds, monitor routine hemodynamics  - monitor and replete lytes, keep K>4, Mg>2  - Other cardiovascular workup will depend on clinical course.  - All other workup per primary team  - Will follow 79 yo M with PMH of COPD (On home O2 3L and BIPAP at while sleeping regardless of time of day), asthma, CAD (w/ 3v CABG ), s/p 2 recent coronary stents at [place], PVD s/p LLE stent (2019), HLD, DM2 (on Metformin and insulin while on steroid tapers), BPH, hx Hypercapnic Respiratory Failure/Cardiac Arrest requiring intubation (), with multiple frequent admissions, last in 2020 Respiratory failure.  Presents with progressive worsening dyspnea. Found to have Acute hypercapnic respiratory failure 2/2 COPD exacerbation. Cardio consulted for dyspnea r/o CHF    - Pt has hx of multiple admissions for acute hypercapnic respiratory, readmitted again for acute hypercapnic respiratory likely 2/2 COPD exascerbation  - AB at presentation  - No clear evidence of acute ischemia, trops negative x 1. will f/u second set. pt asymptomatic  - No hx of CHF. BMP unimpressive. No meaningful evidence of volume overload on exam. Lungs clear on CXR  -  TTE 2020: preserved left ventricular systolic function. Doppler evidence of grade 1 diastolic dysfunction. The IVC appears dilated at 2.6 cm, though collapses >50% with respiration. No significant pericardial effusion    - /84 now 139/87 s/p losartan, continue home BP meds, monitor routine hemodynamics  - monitor and replete lytes, keep K>4, Mg>2  - Other cardiovascular workup will depend on clinical course.  - All other workup per primary team  - Will follow 81 yo M with PMH of COPD (On home O2 3L and BIPAP at while sleeping regardless of time of day), asthma, CAD (w/ 3v CABG ), s/p 2 recent coronary stents at [place], PVD s/p LLE stent (2019), HLD, DM2 (on Metformin and insulin while on steroid tapers), BPH, hx Hypercapnic Respiratory Failure/Cardiac Arrest requiring intubation (), with multiple frequent admissions, last in 2020 Respiratory failure.  Presents with progressive worsening dyspnea. Found to have Acute hypercapnic respiratory failure 2/2 COPD exacerbation. Cardio consulted for dyspnea r/o CHF    - Pt has hx of multiple admissions for acute hypercapnic respiratory, readmitted again for acute hypercapnic respiratory likely 2/2 COPD exascerbation  - AB at presentation  - No clear evidence of acute ischemia, trops negative x 1.   - No hx of CHF. BMP unimpressive. No meaningful evidence of volume overload on exam. Lungs clear on CXR. No need for diuresis at this time.  -  TTE 2020: preserved left ventricular systolic function. Doppler evidence of grade 1 diastolic dysfunction. The IVC appears dilated at 2.6 cm, though collapses >50% with respiration. No significant pericardial effusion    - /84 now 139/87 s/p losartan, continue home BP meds, monitor routine hemodynamics  - monitor and replete lytes, keep K>4, Mg>2  - Other cardiovascular workup will depend on clinical course.  - All other workup per primary team  - Will follow 81 yo M with PMH of COPD (On home O2 3L and BIPAP at while sleeping regardless of time of day), asthma, CAD (w/ 3v CABG ), s/p 2 recent coronary stents at [place], PVD s/p LLE stent (2019), HLD, DM2 (on Metformin and insulin while on steroid tapers), BPH, hx Hypercapnic Respiratory Failure/Cardiac Arrest requiring intubation (), with multiple frequent admissions, last in 2020 Respiratory failure.  Presents with progressive worsening dyspnea. Found to have Acute hypercapnic respiratory failure 2/2 COPD exacerbation. Cardio consulted for dyspnea r/o CHF    - Pt has hx of multiple admissions for acute hypercapnic respiratory, readmitted again for acute hypercapnic respiratory likely 2/2 COPD exascerbation  - AB at presentation  - Continue BIPAP for respiratory support.  Wean as tolerated  - No clear evidence of acute ischemia, trops negative x 1.   - No hx of CHF. BMP unimpressive. No meaningful evidence of volume overload on exam. Lungs clear on CXR. No need for diuresis at this time.  -  TTE 2020: preserved left ventricular systolic function. Doppler evidence of grade 1 diastolic dysfunction. The IVC appears dilated at 2.6 cm, though collapses >50% with respiration. No significant pericardial effusion    - /84 now 139/87 s/p losartan, continue home BP meds, monitor routine hemodynamics  - monitor and replete lytes, keep K>4, Mg>2  - Continue home cardiac medications.  - Other cardiovascular workup will depend on clinical course.  - All other workup per primary team  - Will follow

## 2020-10-27 NOTE — PROVIDER CONTACT NOTE (OTHER) - SITUATION
MD called to be made aware of patient blood sugar and elevated blood pressure. Also called to be made aware that no diet order was placed in computer.

## 2020-10-27 NOTE — ED PROVIDER NOTE - OBJECTIVE STATEMENT
79yo male bib ems with sob since last nite. pt c/o sudden onset of sob, no cough, fever, chills, no chest pain, pt has hx of copd and has been on bipap, pt was put on bipap by ems with improvement of symptoms

## 2020-10-27 NOTE — H&P ADULT - HISTORY OF PRESENT ILLNESS
81 yo M with PMH of COPD (On home O2 3L and BIPAP at while sleeping regardless of time of day), asthma, CAD (w/ 3v CABG 2004), s/p 2 recent coronary stents at [place], PVD s/p LLE stent (11/2019), HLD, DM2 (on Metformin and insulin while on steroid tapers), BPH, hx Hypercapnic Respiratory Failure/Cardiac Arrest requiring intubation (6/18), with multiple frequent admissions, last in 8/2020 Respiratory failure. Wife is at bedside, . pt c/o sudden onset of sob, was not improving on bipap, was becoming more lethargic, admitted fo hypercapnic respiratory faulure with c02 of 80, will continue on bipap

## 2020-10-27 NOTE — CONSULT NOTE ADULT - SUBJECTIVE AND OBJECTIVE BOX
YUE COTTO    V APER 21    Patient is a 80y old  Male who presents with a chief complaint of      Allergies    No Known Allergies    Intolerances    shellfish (Nausea)      HPI:      PAST MEDICAL & SURGICAL HISTORY:  PVD (peripheral vascular disease)    Hypertension    COPD (chronic obstructive pulmonary disease)  on 2L at home and BiPAP at night; intubated     Osteomyelitis    Dyslipidemia    CAD (Coronary Artery Disease)  s/p 3v CABG ; [facility]     Diabetes Mellitus, Type II    S/P primary angioplasty with coronary stent    Compound fracture  left leg    CABG (Coronary Artery Bypass Graft)          FAMILY HISTORY:  Family hx of lung cancer  brother,  age 82, used to smoke with pt    Family history of diabetes mellitus (Sibling)          MEDICATIONS       Vital Signs Last 24 Hrs  T(C): 36.6 (27 Oct 2020 06:01), Max: 36.6 (27 Oct 2020 06:01)  T(F): 97.8 (27 Oct 2020 06:01), Max: 97.8 (27 Oct 2020 06:01)  HR: 120 (27 Oct 2020 07:39) (120 - 138)  BP: 178/85 (27 Oct 2020 07:39) (178/85 - 198/103)  BP(mean): --  RR: 20 (27 Oct 2020 07:39) (20 - 36)  SpO2: 100% (27 Oct 2020 07:39) (99% - 100%)            LABS:                        13.4   11.90 )-----------( 227      ( 27 Oct 2020 06:34 )             42.1     10    139  |  103  |  17  ----------------------------<  216<H>  4.8   |  33<H>  |  1.40<H>    Ca    9.4      27 Oct 2020 06:34    TPro  7.5  /  Alb  3.7  /  TBili  0.5  /  DBili  x   /  AST  34  /  ALT  23  /  AlkPhos  93  10-27    PT/INR - ( 27 Oct 2020 06:34 )   PT: 11.1 sec;   INR: 0.95 ratio         PTT - ( 27 Oct 2020 06:34 )  PTT:29.6 sec      ABG - ( 27 Oct 2020 08:50 )  pH, Arterial: 7.23  pH, Blood: x     /  pCO2: 81    /  pO2: 172   / HCO3: 27    / Base Excess: 5.6   /  SaO2: 99                  WBC:  WBC Count: 11.90 K/uL (10-27 @ 06:34)      MICROBIOLOGY:  RECENT CULTURES:        CARDIAC MARKERS ( 27 Oct 2020 06:34 )  <.015 ng/mL / x     / x     / x     / x            PT/INR - ( 27 Oct 2020 06:34 )   PT: 11.1 sec;   INR: 0.95 ratio         PTT - ( 27 Oct 2020 06:34 )  PTT:29.6 sec    Sodium:  Sodium, Serum: 139 mmol/L (10-27 @ 06:34)      1.40 mg/dL 10-27 @ :34      Hemoglobin:  Hemoglobin: 13.4 g/dL (10-27 @ 06:34)      Platelets: Platelet Count - Automated: 227 K/uL (10-27 @ 06:34)      LIVER FUNCTIONS - ( 27 Oct 2020 06:34 )  Alb: 3.7 g/dL / Pro: 7.5 g/dL / ALK PHOS: 93 U/L / ALT: 23 U/L / AST: 34 U/L / GGT: x                 RADIOLOGY & ADDITIONAL STUDIES:

## 2020-10-27 NOTE — CONSULT NOTE ADULT - SUBJECTIVE AND OBJECTIVE BOX
HPI: 81 y/o M w/ pmh of COPD (on home o2 3L and bipap qhs), eosinophilic asthma, cad (s/p cabg 2004), s/p 2 stents place at [place], D s/p LLE stents (11/2019(, hld, dm2, bph, previous cardiac arrest 2/2 to hypercarbic resp failure 2/2 to COPD exacerbation, presented to [facility] on 10/27/2020 for SOB pt was found to be in hypercarbic resp failure was started in bipap at 20/6 fio2 w/ an ABG of 7.23/81/172/99% repeat ABG of 7.24/71/95/96 at bipap setting of 24/6 at 30% fio2. MICU consulted 2/2 for evaluation During examination/interview pt reports feeling better and SOB has significantly improved. Pt taken off bipap temporally and desaturated to 84%% and thus placed back on bipap. Pt otherwise denies any other medical complains at this time.       Review of Systems:  Constitutional: No fever, chills, fatigue  Neuro: No headache, numbness, weakness  Resp: No cough, wheezing, shortness of breath  CVS: No chest pain, palpitations, leg swelling  GI: No abdominal pain, nausea, vomiting, diarrhea   : No dysuria, frequency, incontinence  Skin: No itching, burning, rashes, or lesions   Msk: No joint pain or swelling  Psych: No depression, anxiety, mood swings    T(F): 97.8 (10-27-20 @ 06:01), Max: 97.8 (10-27-20 @ 06:01)  HR: 113 (10-27-20 @ 12:19) (113 - 138)  BP: 139/87 (10-27-20 @ 12:19) (139/87 - 198/103)  RR: 20 (10-27-20 @ 12:19) (20 - 36)  SpO2: 99% (10-27-20 @ 12:19) (97% - 110%)  Wt(kg): --        CAPILLARY BLOOD GLUCOSE      POCT Blood Glucose.: 244 mg/dL (27 Oct 2020 10:49)      I&O's Summary      Physical Exam:   Gen: Comfortable in bed in NAD  Neuro: AAox3   HEENT: PERRL  Resp: diffuse diminished breath sounds w/ diffuse mild expiratory wheeze w/ mild belly breathing  CVS: mild tachycardia   Abd: BSx4, soft, nt/nd  Ext: no edema   Skin: warm/dry    Meds:  azithromycin  IVPB   cefTRIAXone   IVPB IV Intermittent    losartan Oral  metoprolol tartrate Oral  tamsulosin Oral    atorvastatin Oral  metFORMIN Oral  methylPREDNISolone sodium succinate Injectable IV Push    albuterol/ipratropium for Nebulization Nebulizer  budesonide 160 MICROgram(s)/formoterol 4.5 MICROgram(s) Inhaler Inhalation  montelukast Oral  tiotropium 18 MICROgram(s) Capsule Inhalation        aspirin enteric coated Oral  clopidogrel Tablet Oral  enoxaparin Injectable SubCutaneous                            13.4   11.90 )-----------( 227      ( 27 Oct 2020 06:34 )             42.1       10-27    139  |  103  |  17  ----------------------------<  216<H>  4.8   |  33<H>  |  1.40<H>    Ca    9.4      27 Oct 2020 06:34    TPro  7.5  /  Alb  3.7  /  TBili  0.5  /  DBili  x   /  AST  34  /  ALT  23  /  AlkPhos  93  10-27    Lactate 0.9           10-27 @ 06:34      CARDIAC MARKERS ( 27 Oct 2020 06:34 )  <.015 ng/mL / x     / x     / x     / x          PT/INR - ( 27 Oct 2020 06:34 )   PT: 11.1 sec;   INR: 0.95 ratio         PTT - ( 27 Oct 2020 06:34 )  PTT:29.6 sec        Radiology:     < from: Xray Chest 1 View-PORTABLE IMMEDIATE (10.27.20 @ 06:23) >    EXAM:  XR CHEST PORTABLE IMMED 1V                            PROCEDURE DATE:  10/27/2020          INTERPRETATION:  CLINICAL INDICATION: 80 years  Male with Shortness of Breath, Cough,  Fever.    COMPARISON: 9/23/2020    The left lateral costophrenic angle is partially omitted.    AP view of the chest demonstrates the lungs to be clear. There is no pleural effusion. There is no pneumothorax.    The heart is normal in size. Sternotomy wires are present. There is no mediastinal or hilar mass.    The pulmonary vasculature is normal.    Mild thoracic degenerative changes are present.    IMPRESSION:    Sternotomy. No acute infiltrate.        MARCE CORNELIUS MD; Attending Radiologist  This document has been electronically signed. Oct 27 2020  9:23AM    < end of copied text >          CENTRAL LINE: N  SHARMA: Y  A-LINE: N    GLOBAL ISSUE/BEST PRACTICE:  Analgesia: N  Sedation: N  CAM-ICU: n/a  HOB elevation: Y  Stress ulcer prophylaxis: Y  VTE prophylaxis: Y  Glycemic control: Y  Nutrition: Y    CODE STATUS: FULL CODE    CRITICAL CARE TIME SPENT: 35 mins  (Assessing presenting problems of acute illness, which pose high probability of life threatening deterioration or end organ damage/dysfunction, as well as medical decision making including initiating plan of care, reviewing data, reviewing radiologic exams, discussing with multidisciplinary team,  discussing goals of care with patient/family, and writing this note.  Non-inclusive of procedures performed)     HPI: 81 y/o M w/ pmh of COPD (on home o2 3L and bipap qhs), eosinophilic asthma, cad (s/p cabg 2004), s/p 2 stents place at [place], D s/p LLE stents (11/2019(, hld, dm2, bph, previous cardiac arrest 2/2 to hypercarbic resp failure 2/2 to COPD exacerbation, presented to [facility] on 10/27/2020 for SOB pt was found to be in hypercarbic resp failure was started in bipap at 20/6 fio2 w/ an ABG of 7.23/81/172/99% repeat ABG of 7.24/71/95/96 at bipap setting of 24/6 at 30% fio2. MICU consulted 2/2 for evaluation During examination/interview pt reports feeling better and SOB has significantly improved. Pt taken off bipap temporally and desaturated to 84%% and thus placed back on bipap. Pt otherwise denies any other medical complains at this time.       Review of Systems:  Constitutional: No fever, chills, fatigue  Neuro: No headache, numbness, weakness  Resp: No cough, +wheezing, +shortness of breath  CVS: No chest pain, palpitations, leg swelling  GI: No abdominal pain, nausea, vomiting, diarrhea   : No dysuria, frequency, incontinence  Skin: No itching, burning, rashes, or lesions   Msk: No joint pain or swelling  Psych: No depression, anxiety, mood swings    T(F): 97.8 (10-27-20 @ 06:01), Max: 97.8 (10-27-20 @ 06:01)  HR: 113 (10-27-20 @ 12:19) (113 - 138)  BP: 139/87 (10-27-20 @ 12:19) (139/87 - 198/103)  RR: 20 (10-27-20 @ 12:19) (20 - 36)  SpO2: 99% (10-27-20 @ 12:19) (97% - 110%)  Wt(kg): --        CAPILLARY BLOOD GLUCOSE      POCT Blood Glucose.: 244 mg/dL (27 Oct 2020 10:49)      I&O's Summary      Physical Exam:   Gen: Comfortable in bed in NAD  Neuro: AAox3   HEENT: PERRL  Resp: diffuse diminished breath sounds w/ diffuse mild expiratory wheeze w/ mild belly breathing  CVS: mild tachycardia   Abd: BSx4, soft, nt/nd  Ext: no edema   Skin: warm/dry    Meds:  azithromycin  IVPB   cefTRIAXone   IVPB IV Intermittent    losartan Oral  metoprolol tartrate Oral  tamsulosin Oral    atorvastatin Oral  metFORMIN Oral  methylPREDNISolone sodium succinate Injectable IV Push    albuterol/ipratropium for Nebulization Nebulizer  budesonide 160 MICROgram(s)/formoterol 4.5 MICROgram(s) Inhaler Inhalation  montelukast Oral  tiotropium 18 MICROgram(s) Capsule Inhalation        aspirin enteric coated Oral  clopidogrel Tablet Oral  enoxaparin Injectable SubCutaneous                            13.4   11.90 )-----------( 227      ( 27 Oct 2020 06:34 )             42.1       10-27    139  |  103  |  17  ----------------------------<  216<H>  4.8   |  33<H>  |  1.40<H>    Ca    9.4      27 Oct 2020 06:34    TPro  7.5  /  Alb  3.7  /  TBili  0.5  /  DBili  x   /  AST  34  /  ALT  23  /  AlkPhos  93  10-27    Lactate 0.9           10-27 @ 06:34      CARDIAC MARKERS ( 27 Oct 2020 06:34 )  <.015 ng/mL / x     / x     / x     / x          PT/INR - ( 27 Oct 2020 06:34 )   PT: 11.1 sec;   INR: 0.95 ratio         PTT - ( 27 Oct 2020 06:34 )  PTT:29.6 sec        Radiology:     < from: Xray Chest 1 View-PORTABLE IMMEDIATE (10.27.20 @ 06:23) >    EXAM:  XR CHEST PORTABLE IMMED 1V                            PROCEDURE DATE:  10/27/2020          INTERPRETATION:  CLINICAL INDICATION: 80 years  Male with Shortness of Breath, Cough,  Fever.    COMPARISON: 9/23/2020    The left lateral costophrenic angle is partially omitted.    AP view of the chest demonstrates the lungs to be clear. There is no pleural effusion. There is no pneumothorax.    The heart is normal in size. Sternotomy wires are present. There is no mediastinal or hilar mass.    The pulmonary vasculature is normal.    Mild thoracic degenerative changes are present.    IMPRESSION:    Sternotomy. No acute infiltrate.        MACRE CORNELIUS MD; Attending Radiologist  This document has been electronically signed. Oct 27 2020  9:23AM    < end of copied text >          CENTRAL LINE: N  SHARMA: Y  A-LINE: N    GLOBAL ISSUE/BEST PRACTICE:  Analgesia: N  Sedation: N  CAM-ICU: n/a  HOB elevation: Y  Stress ulcer prophylaxis: Y  VTE prophylaxis: Y  Glycemic control: Y  Nutrition: Y    CODE STATUS: FULL CODE    CRITICAL CARE TIME SPENT: 35 mins  (Assessing presenting problems of acute illness, which pose high probability of life threatening deterioration or end organ damage/dysfunction, as well as medical decision making including initiating plan of care, reviewing data, reviewing radiologic exams, discussing with multidisciplinary team,  discussing goals of care with patient/family, and writing this note.  Non-inclusive of procedures performed)

## 2020-10-27 NOTE — H&P ADULT - PROBLEM SELECTOR PLAN 3
Chronic  w/ 3v CABG 2004, s/p 2 recent coronary stents at [place]  -resume home Plavix when able Chronic  w/ 3v CABG 2004, s/p 2 recent coronary stents at [facility]

## 2020-10-27 NOTE — ED ADULT NURSE NOTE - OBJECTIVE STATEMENT
80 yr old male presents to the ED via ems from home with c/o respiratory distress. A+O x 4. Labored breathing noted at 34 RPM. Hypertensive, Tachycardic, and Tachypneic. Received on bipap from ems. Home O2 2 LPM at baseline for COPD. Wheezes heard bilaterally. Wife at the bedside. Respiratory Services at bedside. skin diaphoretic but intact. afebrile. will continue to monitor.

## 2020-10-28 ENCOUNTER — TRANSCRIPTION ENCOUNTER (OUTPATIENT)
Age: 80
End: 2020-10-28

## 2020-10-28 DIAGNOSIS — E11.9 TYPE 2 DIABETES MELLITUS WITHOUT COMPLICATIONS: ICD-10-CM

## 2020-10-28 LAB
ALBUMIN SERPL ELPH-MCNC: 3.2 G/DL — LOW (ref 3.3–5)
ALP SERPL-CCNC: 78 U/L — SIGNIFICANT CHANGE UP (ref 40–120)
ALT FLD-CCNC: 21 U/L — SIGNIFICANT CHANGE UP (ref 12–78)
ANION GAP SERPL CALC-SCNC: 4 MMOL/L — LOW (ref 5–17)
AST SERPL-CCNC: 12 U/L — LOW (ref 15–37)
BASE EXCESS BLDA CALC-SCNC: 6.8 MMOL/L — HIGH (ref -2–2)
BILIRUB SERPL-MCNC: 0.3 MG/DL — SIGNIFICANT CHANGE UP (ref 0.2–1.2)
BLOOD GAS COMMENTS ARTERIAL: SIGNIFICANT CHANGE UP
BUN SERPL-MCNC: 24 MG/DL — HIGH (ref 7–23)
CALCIUM SERPL-MCNC: 9.4 MG/DL — SIGNIFICANT CHANGE UP (ref 8.5–10.1)
CHLORIDE SERPL-SCNC: 103 MMOL/L — SIGNIFICANT CHANGE UP (ref 96–108)
CO2 SERPL-SCNC: 33 MMOL/L — HIGH (ref 22–31)
CREAT SERPL-MCNC: 1.5 MG/DL — HIGH (ref 0.5–1.3)
GLUCOSE SERPL-MCNC: 252 MG/DL — HIGH (ref 70–99)
HCO3 BLDA-SCNC: 30 MMOL/L — HIGH (ref 23–27)
HCT VFR BLD CALC: 35.7 % — LOW (ref 39–50)
HGB BLD-MCNC: 11.5 G/DL — LOW (ref 13–17)
HOROWITZ INDEX BLDA+IHG-RTO: 30 — SIGNIFICANT CHANGE UP
INR BLD: 1.04 RATIO — SIGNIFICANT CHANGE UP (ref 0.88–1.16)
MCHC RBC-ENTMCNC: 28.5 PG — SIGNIFICANT CHANGE UP (ref 27–34)
MCHC RBC-ENTMCNC: 32.2 GM/DL — SIGNIFICANT CHANGE UP (ref 32–36)
MCV RBC AUTO: 88.6 FL — SIGNIFICANT CHANGE UP (ref 80–100)
NRBC # BLD: 0 /100 WBCS — SIGNIFICANT CHANGE UP (ref 0–0)
PCO2 BLDA: 56 MMHG — HIGH (ref 32–46)
PH BLDA: 7.37 — SIGNIFICANT CHANGE UP (ref 7.35–7.45)
PHOSPHATE SERPL-MCNC: 2.8 MG/DL — SIGNIFICANT CHANGE UP (ref 2.5–4.5)
PLATELET # BLD AUTO: 172 K/UL — SIGNIFICANT CHANGE UP (ref 150–400)
PO2 BLDA: 125 MMHG — HIGH (ref 74–108)
POTASSIUM SERPL-MCNC: 4.8 MMOL/L — SIGNIFICANT CHANGE UP (ref 3.5–5.3)
POTASSIUM SERPL-SCNC: 4.8 MMOL/L — SIGNIFICANT CHANGE UP (ref 3.5–5.3)
PROT SERPL-MCNC: 6.5 G/DL — SIGNIFICANT CHANGE UP (ref 6–8.3)
PROTHROM AB SERPL-ACNC: 12.2 SEC — SIGNIFICANT CHANGE UP (ref 10.6–13.6)
RBC # BLD: 4.03 M/UL — LOW (ref 4.2–5.8)
RBC # FLD: 13.7 % — SIGNIFICANT CHANGE UP (ref 10.3–14.5)
SAO2 % BLDA: 99 % — HIGH (ref 92–96)
SODIUM SERPL-SCNC: 140 MMOL/L — SIGNIFICANT CHANGE UP (ref 135–145)
WBC # BLD: 7.54 K/UL — SIGNIFICANT CHANGE UP (ref 3.8–10.5)
WBC # FLD AUTO: 7.54 K/UL — SIGNIFICANT CHANGE UP (ref 3.8–10.5)

## 2020-10-28 PROCEDURE — 99233 SBSQ HOSP IP/OBS HIGH 50: CPT | Mod: GC

## 2020-10-28 PROCEDURE — 99232 SBSQ HOSP IP/OBS MODERATE 35: CPT

## 2020-10-28 PROCEDURE — 99291 CRITICAL CARE FIRST HOUR: CPT

## 2020-10-28 RX ORDER — ALBUTEROL 90 UG/1
1 AEROSOL, METERED ORAL EVERY 12 HOURS
Refills: 0 | Status: DISCONTINUED | OUTPATIENT
Start: 2020-10-28 | End: 2020-10-29

## 2020-10-28 RX ORDER — INSULIN LISPRO 100/ML
VIAL (ML) SUBCUTANEOUS AT BEDTIME
Refills: 0 | Status: DISCONTINUED | OUTPATIENT
Start: 2020-10-28 | End: 2020-10-29

## 2020-10-28 RX ORDER — INSULIN LISPRO 100/ML
VIAL (ML) SUBCUTANEOUS
Refills: 0 | Status: DISCONTINUED | OUTPATIENT
Start: 2020-10-28 | End: 2020-10-29

## 2020-10-28 RX ORDER — AZITHROMYCIN 500 MG/1
500 TABLET, FILM COATED ORAL EVERY 24 HOURS
Refills: 0 | Status: DISCONTINUED | OUTPATIENT
Start: 2020-10-29 | End: 2020-10-29

## 2020-10-28 RX ORDER — SODIUM CHLORIDE 9 MG/ML
500 INJECTION, SOLUTION INTRAVENOUS ONCE
Refills: 0 | Status: DISCONTINUED | OUTPATIENT
Start: 2020-10-28 | End: 2020-10-28

## 2020-10-28 RX ADMIN — Medication 12.5 MILLIGRAM(S): at 17:54

## 2020-10-28 RX ADMIN — Medication 5 UNIT(S): at 08:44

## 2020-10-28 RX ADMIN — Medication 1 SPRAY(S): at 17:54

## 2020-10-28 RX ADMIN — Medication 3 MILLILITER(S): at 07:55

## 2020-10-28 RX ADMIN — CLOPIDOGREL BISULFATE 75 MILLIGRAM(S): 75 TABLET, FILM COATED ORAL at 11:42

## 2020-10-28 RX ADMIN — INSULIN GLARGINE 15 UNIT(S): 100 INJECTION, SOLUTION SUBCUTANEOUS at 22:34

## 2020-10-28 RX ADMIN — MONTELUKAST 10 MILLIGRAM(S): 4 TABLET, CHEWABLE ORAL at 22:31

## 2020-10-28 RX ADMIN — Medication 2: at 08:43

## 2020-10-28 RX ADMIN — Medication 81 MILLIGRAM(S): at 11:42

## 2020-10-28 RX ADMIN — AZITHROMYCIN 255 MILLIGRAM(S): 500 TABLET, FILM COATED ORAL at 10:19

## 2020-10-28 RX ADMIN — Medication 5 UNIT(S): at 12:44

## 2020-10-28 RX ADMIN — ATORVASTATIN CALCIUM 20 MILLIGRAM(S): 80 TABLET, FILM COATED ORAL at 22:30

## 2020-10-28 RX ADMIN — LOSARTAN POTASSIUM 25 MILLIGRAM(S): 100 TABLET, FILM COATED ORAL at 06:06

## 2020-10-28 RX ADMIN — Medication 40 MILLIGRAM(S): at 06:06

## 2020-10-28 RX ADMIN — TIOTROPIUM BROMIDE 1 CAPSULE(S): 18 CAPSULE ORAL; RESPIRATORY (INHALATION) at 07:40

## 2020-10-28 RX ADMIN — Medication 3 MILLILITER(S): at 03:58

## 2020-10-28 RX ADMIN — Medication 2: at 17:50

## 2020-10-28 RX ADMIN — Medication 5 UNIT(S): at 17:50

## 2020-10-28 RX ADMIN — Medication 12.5 MILLIGRAM(S): at 06:09

## 2020-10-28 RX ADMIN — ENOXAPARIN SODIUM 40 MILLIGRAM(S): 100 INJECTION SUBCUTANEOUS at 11:42

## 2020-10-28 RX ADMIN — BUDESONIDE AND FORMOTEROL FUMARATE DIHYDRATE 2 PUFF(S): 160; 4.5 AEROSOL RESPIRATORY (INHALATION) at 07:40

## 2020-10-28 RX ADMIN — TAMSULOSIN HYDROCHLORIDE 0.4 MILLIGRAM(S): 0.4 CAPSULE ORAL at 22:30

## 2020-10-28 RX ADMIN — BUDESONIDE AND FORMOTEROL FUMARATE DIHYDRATE 2 PUFF(S): 160; 4.5 AEROSOL RESPIRATORY (INHALATION) at 22:32

## 2020-10-28 RX ADMIN — Medication 2: at 12:43

## 2020-10-28 NOTE — CONSULT NOTE ADULT - PROBLEM SELECTOR RECOMMENDATION 9
oral anti-diabetes agents on hold  cont lantus 15 units qhs  cont humalog 5 units 3x/day before meals  change mod dose humalog scale coverage qac/qhs  anticipate improvement in bg numbers with tapering of glucocorticoids  adjustment to outpt regimen to be made in light of renal insuff

## 2020-10-28 NOTE — PROGRESS NOTE ADULT - SUBJECTIVE AND OBJECTIVE BOX
Patient is a 80y old  Male who presents with a chief complaint of shortness of breath (28 Oct 2020 06:02)    24 hour events: ***    REVIEW OF SYSTEMS  Constitutional: No fever, chills, fatigue  Neuro: No headache, numbness, weakness  Resp: No cough, wheezing, shortness of breath  CVS: No chest pain, palpitations, leg swelling  GI: No abdominal pain, nausea, vomiting, diarrhea   : No dysuria, frequency, incontinence  Skin: No itching, burning, rashes, or lesions   Msk: No joint pain or swelling  Psych: No depression, anxiety, mood swings  Heme: No bleeding    T(F): 97.6 (10-28-20 @ 04:00), Max: 97.8 (10-27-20 @ 20:09)  HR: 71 (10-28-20 @ 07:00) (58 - 121)  BP: 123/74 (10-28-20 @ 07:00) (106/65 - 178/85)  RR: 21 (10-28-20 @ 07:00) (1 - 24)  SpO2: 100% (10-28-20 @ 07:00) (97% - 110%)  Wt(kg): --            I&O's Summary    10-27 @ 07:01  -  10-28 @ 07:00  --------------------------------------------------------  IN: 240 mL / OUT: 750 mL / NET: -510 mL      PHYSICAL EXAM  General:   CNS:   HEENT:   Resp:   CVS:   Abd:   Ext:   Skin:     MEDICATIONS  azithromycin  IVPB   azithromycin  IVPB IV Intermittent  cefTRIAXone   IVPB IV Intermittent    losartan Oral  metoprolol tartrate Oral  tamsulosin Oral    atorvastatin Oral  dextrose 40% Gel Oral PRN  dextrose 50% Injectable IV Push  dextrose 50% Injectable IV Push  dextrose 50% Injectable IV Push  glucagon  Injectable IntraMuscular PRN  insulin glargine Injectable (LANTUS) SubCutaneous  insulin lispro (ADMELOG) corrective regimen sliding scale SubCutaneous  insulin lispro (ADMELOG) corrective regimen sliding scale SubCutaneous  insulin lispro Injectable (ADMELOG) SubCutaneous  methylPREDNISolone sodium succinate Injectable IV Push    albuterol/ipratropium for Nebulization Nebulizer  budesonide 160 MICROgram(s)/formoterol 4.5 MICROgram(s) Inhaler Inhalation  montelukast Oral  tiotropium 18 MICROgram(s) Capsule Inhalation        aspirin enteric coated Oral  clopidogrel Tablet Oral  enoxaparin Injectable SubCutaneous        dextrose 5%. IV Continuous      fluticasone propionate 50 MICROgram(s)/spray Nasal Spray Both Nostrils                            11.5   7.54  )-----------( 172      ( 28 Oct 2020 05:34 )             35.7       10-28    140  |  103  |  24<H>  ----------------------------<  252<H>  4.8   |  33<H>  |  1.50<H>    Ca    9.4      28 Oct 2020 05:34  Phos  2.8     10-28    TPro  6.5  /  Alb  3.2<L>  /  TBili  0.3  /  DBili  x   /  AST  12<L>  /  ALT  21  /  AlkPhos  78  10-28      CARDIAC MARKERS ( 27 Oct 2020 06:34 )  <.015 ng/mL / x     / x     / x     / x          PT/INR - ( 28 Oct 2020 05:34 )   PT: 12.2 sec;   INR: 1.04 ratio         PTT - ( 27 Oct 2020 06:34 )  PTT:29.6 sec    Radiology: ***  Bedside lung ultrasound: ***  Bedside ECHO: ***    CENTRAL LINE: N  SHARMA: Y  A-LINE: N    GLOBAL ISSUE/BEST PRACTICE:  Analgesia: N  Sedation: N  CAM-ICU: n/a  HOB elevation: Y  Stress ulcer prophylaxis: Y  VTE prophylaxis: Y  Glycemic control: Y  Nutrition: Y    CODE STATUS: FULL CODE  GOC discussion: Y       Patient is a 80y old  Male who presents with a chief complaint of shortness of breath (28 Oct 2020 06:02)    24 hour events: No events overnight. Pt has no complaints. He says he is feeling much better today and is eager to go home.     REVIEW OF SYSTEMS  Constitutional: No fever, chills, fatigue  Neuro: No headache, numbness, weakness  Resp: No cough, wheezing, shortness of breath  CVS: No chest pain, palpitations, leg swelling  GI: No abdominal pain, nausea, vomiting, diarrhea   : No dysuria, frequency, incontinence  Skin: No itching, burning, rashes, or lesions   Msk: No joint pain or swelling  Psych: No depression, anxiety, mood swings  Heme: No bleeding    T(F): 97.6 (10-28-20 @ 04:00), Max: 97.8 (10-27-20 @ 20:09)  HR: 71 (10-28-20 @ 07:00) (58 - 121)  BP: 123/74 (10-28-20 @ 07:00) (106/65 - 178/85)  RR: 21 (10-28-20 @ 07:00) (1 - 24)  SpO2: 100% (10-28-20 @ 07:00) (97% - 110%)  Wt(kg): --    I&O's Summary    10-27 @ 07:01  -  10-28 @ 07:00  --------------------------------------------------------  IN: 240 mL / OUT: 750 mL / NET: -510 mL      PHYSICAL EXAM  Gen: Comfortable in bed in NAD  Neuro: AAox3, responds appropriately   HEENT: EOM intact, PERRL, moist mucus membranes   Resp: Diffuse expiratory wheezing   CVS: RRR, +S1&S2, no murmurs, rubs, or gallops   Abd: BSx4, soft, nt/nd  Ext: no edema   Skin: warm/dry    MEDICATIONS  azithromycin  IVPB   azithromycin  IVPB IV Intermittent  cefTRIAXone   IVPB IV Intermittent    losartan Oral  metoprolol tartrate Oral  tamsulosin Oral    atorvastatin Oral  dextrose 40% Gel Oral PRN  dextrose 50% Injectable IV Push  dextrose 50% Injectable IV Push  dextrose 50% Injectable IV Push  glucagon  Injectable IntraMuscular PRN  insulin glargine Injectable (LANTUS) SubCutaneous  insulin lispro (ADMELOG) corrective regimen sliding scale SubCutaneous  insulin lispro (ADMELOG) corrective regimen sliding scale SubCutaneous  insulin lispro Injectable (ADMELOG) SubCutaneous  methylPREDNISolone sodium succinate Injectable IV Push    albuterol/ipratropium for Nebulization Nebulizer  budesonide 160 MICROgram(s)/formoterol 4.5 MICROgram(s) Inhaler Inhalation  montelukast Oral  tiotropium 18 MICROgram(s) Capsule Inhalation    aspirin enteric coated Oral  clopidogrel Tablet Oral  enoxaparin Injectable SubCutaneous    dextrose 5%. IV Continuous    fluticasone propionate 50 MICROgram(s)/spray Nasal Spray Both Nostrils                          11.5   7.54  )-----------( 172      ( 28 Oct 2020 05:34 )             35.7       10-28    140  |  103  |  24<H>  ----------------------------<  252<H>  4.8   |  33<H>  |  1.50<H>    Ca    9.4      28 Oct 2020 05:34  Phos  2.8     10-28    TPro  6.5  /  Alb  3.2<L>  /  TBili  0.3  /  DBili  x   /  AST  12<L>  /  ALT  21  /  AlkPhos  78  10-28      CARDIAC MARKERS ( 27 Oct 2020 06:34 )  <.015 ng/mL / x     / x     / x     / x          PT/INR - ( 28 Oct 2020 05:34 )   PT: 12.2 sec;   INR: 1.04 ratio         PTT - ( 27 Oct 2020 06:34 )  PTT:29.6 sec    Radiology: no new imaging   Bedside lung ultrasound: N/A  Bedside ECHO: N/A    CENTRAL LINE: N  SHARMA: Y  A-LINE: N    GLOBAL ISSUE/BEST PRACTICE:  Analgesia: N  Sedation: N  CAM-ICU: n/a  HOB elevation: Y  Stress ulcer prophylaxis: Y  VTE prophylaxis: Y  Glycemic control: Y  Nutrition: Y    CODE STATUS: FULL CODE  GOC discussion: Y

## 2020-10-28 NOTE — PROGRESS NOTE ADULT - SUBJECTIVE AND OBJECTIVE BOX
YUE COTTO    [facility] ICU1 13    Patient is a 80y old  Male who presents with a chief complaint of shortness of breath (27 Oct 2020 14:15)       Allergies    No Known Allergies    Intolerances    shellfish (Nausea)      HPI:  81 yo M with PMH of COPD (On home O2 3L and BIPAP at while sleeping regardless of time of day), asthma, CAD (w/ 3v CABG ), s/p 2 recent coronary stents at [place], PVD s/p LLE stent (2019), HLD, DM2 (on Metformin and insulin while on steroid tapers), BPH, hx Hypercapnic Respiratory Failure/Cardiac Arrest requiring intubation (), with multiple frequent admissions, last in 2020 Respiratory failure. Wife is at bedside, . pt c/o sudden onset of sob, was not improving on bipap, was becoming more lethargic, admitted fo hypercapnic respiratory faulure with c02 of 80, will continue on bipap   (27 Oct 2020 10:01)      PAST MEDICAL & SURGICAL HISTORY:  PVD (peripheral vascular disease)    Hypertension    COPD (chronic obstructive pulmonary disease)  on 2L at home and BiPAP at night; intubated     Osteomyelitis    Dyslipidemia    CAD (Coronary Artery Disease)  s/p 3v CABG ; stents placed in [place] in     Diabetes Mellitus, Type II    S/P primary angioplasty with coronary stent    Compound fracture  left leg    CABG (Coronary Artery Bypass Graft)          FAMILY HISTORY:  Family hx of lung cancer  brother,  age 82, used to smoke with pt    Family history of diabetes mellitus (Sibling)          MEDICATIONS   albuterol/ipratropium for Nebulization 3 milliLiter(s) Nebulizer every 4 hours  aspirin enteric coated 81 milliGRAM(s) Oral daily  atorvastatin 20 milliGRAM(s) Oral at bedtime  azithromycin  IVPB      azithromycin  IVPB 500 milliGRAM(s) IV Intermittent every 24 hours  budesonide 160 MICROgram(s)/formoterol 4.5 MICROgram(s) Inhaler 2 Puff(s) Inhalation two times a day  cefTRIAXone   IVPB 1000 milliGRAM(s) IV Intermittent every 24 hours  clopidogrel Tablet 75 milliGRAM(s) Oral daily  dextrose 40% Gel 15 Gram(s) Oral once PRN  dextrose 5%. 1000 milliLiter(s) IV Continuous <Continuous>  dextrose 50% Injectable 12.5 Gram(s) IV Push once  dextrose 50% Injectable 25 Gram(s) IV Push once  dextrose 50% Injectable 25 Gram(s) IV Push once  enoxaparin Injectable 40 milliGRAM(s) SubCutaneous daily  fluticasone propionate 50 MICROgram(s)/spray Nasal Spray 1 Spray(s) Both Nostrils two times a day  glucagon  Injectable 1 milliGRAM(s) IntraMuscular once PRN  insulin glargine Injectable (LANTUS) 15 Unit(s) SubCutaneous at bedtime  insulin lispro (ADMELOG) corrective regimen sliding scale   SubCutaneous three times a day before meals  insulin lispro (ADMELOG) corrective regimen sliding scale   SubCutaneous at bedtime  insulin lispro Injectable (ADMELOG) 5 Unit(s) SubCutaneous three times a day before meals  losartan 25 milliGRAM(s) Oral daily  methylPREDNISolone sodium succinate Injectable 40 milliGRAM(s) IV Push every 12 hours  metoprolol tartrate 12.5 milliGRAM(s) Oral two times a day  montelukast 10 milliGRAM(s) Oral at bedtime  tamsulosin 0.4 milliGRAM(s) Oral at bedtime  tiotropium 18 MICROgram(s) Capsule 1 Capsule(s) Inhalation daily      Vital Signs Last 24 Hrs  T(C): 36.5 (27 Oct 2020 23:57), Max: 36.6 (27 Oct 2020 20:09)  T(F): 97.7 (27 Oct 2020 23:57), Max: 97.8 (27 Oct 2020 20:09)  HR: 58 (28 Oct 2020 04:00) (58 - 133)  BP: 114/56 (28 Oct 2020 04:00) (106/65 - 178/85)  BP(mean): 79 (28 Oct 2020 04:00) (79 - 96)  RR: 18 (28 Oct 2020 04:00) (1 - 22)  SpO2: 100% (28 Oct 2020 04:00) (97% - 110%)      10-27-20 @ 07:01  -  10-28-20 @ 06:03  --------------------------------------------------------  IN: 120 mL / OUT: 550 mL / NET: -430 mL            LABS:                        11.5   7.54  )-----------( 172      ( 28 Oct 2020 05:34 )             35.7     10-28    140  |  103  |  24<H>  ----------------------------<  252<H>  4.8   |  33<H>  |  1.50<H>    Ca    9.4      28 Oct 2020 05:34  Phos  2.8     10-28    TPro  6.5  /  Alb  3.2<L>  /  TBili  0.3  /  DBili  x   /  AST  12<L>  /  ALT  21  /  AlkPhos  78  10-28    PT/INR - ( 28 Oct 2020 05:34 )   PT: 12.2 sec;   INR: 1.04 ratio         PTT - ( 27 Oct 2020 06:34 )  PTT:29.6 sec      ABG - ( 27 Oct 2020 14:48 )  pH, Arterial: 7.32  pH, Blood: x     /  pCO2: 57    /  pO2: 73    / HCO3: 26    / Base Excess: 3.1   /  SaO2: 94                  WBC:  WBC Count: 7.54 K/uL (10-28 @ 05:34)  WBC Count: 11.90 K/uL (10-27 @ 06:34)      MICROBIOLOGY:  RECENT CULTURES:        CARDIAC MARKERS ( 27 Oct 2020 06:34 )  <.015 ng/mL / x     / x     / x     / x            PT/INR - ( 28 Oct 2020 05:34 )   PT: 12.2 sec;   INR: 1.04 ratio         PTT - ( 27 Oct 2020 06:34 )  PTT:29.6 sec    Sodium:  Sodium, Serum: 140 mmol/L (10-28 @ 05:34)  Sodium, Serum: 139 mmol/L (10-27 @ 06:34)      1.50 mg/dL 10-28 @ 05:34  1.40 mg/dL 10-27 @ 06:34      Hemoglobin:  Hemoglobin: 11.5 g/dL (10-28 @ 05:34)  Hemoglobin: 13.4 g/dL (10-27 @ 06:34)      Platelets: Platelet Count - Automated: 172 K/uL (10-28 @ 05:34)  Platelet Count - Automated: 227 K/uL (10-27 @ 06:34)      LIVER FUNCTIONS - ( 28 Oct 2020 05:34 )  Alb: 3.2 g/dL / Pro: 6.5 g/dL / ALK PHOS: 78 U/L / ALT: 21 U/L / AST: 12 U/L / GGT: x                 RADIOLOGY & ADDITIONAL STUDIES:

## 2020-10-28 NOTE — PROGRESS NOTE ADULT - ATTENDING COMMENTS
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 Wheezing and hypercapnia have improved.    - Change steroids to prednisone 40 mg daily with slow taper  - S/p Mepolizumab injection on 10/9  - D/c ceftriaxone, continue azithromycin 500 mg daily for 5 days, then 250 mg 3x/week   - Albuterol nebs q4h  - Symbicort + Spiriva  - Continue BiPAP, decrease to 22/6, patient may use own BiPAP. Try using without supplemental oxygen, but may require 2L with BiPAP  - Supplemental oxygen with NC when off BiPAP, goal SpO2>90%  - Will likely need slower steroid taper upon discharge  - Consider long term azithromycin therapy 250-500 mg MWF upon discharge  - Continue montelukast 10 mg at bedtime  - Outpatient pulmonary rehab  - PT eval  - Consider outpatient BLVR given recurrent exacerbations with residual volume on PFTs>200%  - H/o CAD + Diastolic dysfunction --> continue aspirin, clopidogrel, losartan, metoprolol, and statin  - Recent 2D-echo with normal LV systolic function, no significant valvular disease, no history of pulmonary hypertension  - DASH/TLC diet as tolerates  - Stable CKD, monitor BUN/Cr, strict I/O's  - Continue Lantus 15 qhs + Lispro 5 tid ac + insulin coverage scale, endocrine eval  - DVT ppx  - Discussed with patient and wife Sania at bedside

## 2020-10-28 NOTE — PROGRESS NOTE ADULT - PROBLEM SELECTOR PLAN 5
Chronic  -hold home po meds   - Insulin sliding scale for diabetes management  - hypoglycemia protocol, am A1c  - pt normally adds novolog 5u daily when on high dose steroids  -Care as per ICU

## 2020-10-28 NOTE — CONSULT NOTE ADULT - SUBJECTIVE AND OBJECTIVE BOX
Patient is a 80y old  Male who presents with a chief complaint of shortness of breath (28 Oct 2020 12:32)      Reason For Consult: dm2 uncontrolled    HPI:  79 yo M with PMH of COPD (On home O2 3L and BIPAP at while sleeping regardless of time of day), asthma, CAD (w/ 3v CABG ), s/p 2 recent coronary stents at [place], PVD s/p LLE stent (2019), HLD, DM2 (on Metformin and insulin while on steroid tapers), BPH, hx Hypercapnic Respiratory Failure/Cardiac Arrest requiring intubation (), with multiple frequent admissions, last in 2020 Respiratory failure. Wife is at bedside, . pt c/o sudden onset of sob, was not improving on bipap, was becoming more lethargic, admitted fo hypercapnic respiratory faulure with c02 of 80, will continue on bipap   (27 Oct 2020 10:01)      PAST MEDICAL & SURGICAL HISTORY:  PVD (peripheral vascular disease)    Hypertension    COPD (chronic obstructive pulmonary disease)  on 2L at home and BiPAP at night; intubated     Osteomyelitis    Dyslipidemia    CAD (Coronary Artery Disease)  s/p 3v CABG ; stents placed in [place] in     Diabetes Mellitus, Type II    S/P primary angioplasty with coronary stent    Compound fracture  left leg    CABG (Coronary Artery Bypass Graft)          FAMILY HISTORY:  Family hx of lung cancer  brother,  age 82, used to smoke with pt    Family history of diabetes mellitus (Sibling)          Social History:    MEDICATIONS  (STANDING):  aspirin enteric coated 81 milliGRAM(s) Oral daily  atorvastatin 20 milliGRAM(s) Oral at bedtime  budesonide 160 MICROgram(s)/formoterol 4.5 MICROgram(s) Inhaler 2 Puff(s) Inhalation two times a day  clopidogrel Tablet 75 milliGRAM(s) Oral daily  dextrose 5%. 1000 milliLiter(s) (50 mL/Hr) IV Continuous <Continuous>  dextrose 50% Injectable 12.5 Gram(s) IV Push once  dextrose 50% Injectable 25 Gram(s) IV Push once  dextrose 50% Injectable 25 Gram(s) IV Push once  enoxaparin Injectable 40 milliGRAM(s) SubCutaneous daily  fluticasone propionate 50 MICROgram(s)/spray Nasal Spray 1 Spray(s) Both Nostrils two times a day  insulin glargine Injectable (LANTUS) 15 Unit(s) SubCutaneous at bedtime  insulin lispro (ADMELOG) corrective regimen sliding scale   SubCutaneous three times a day before meals  insulin lispro (ADMELOG) corrective regimen sliding scale   SubCutaneous at bedtime  insulin lispro Injectable (ADMELOG) 5 Unit(s) SubCutaneous three times a day before meals  losartan 25 milliGRAM(s) Oral daily  methylPREDNISolone sodium succinate Injectable 40 milliGRAM(s) IV Push daily  metoprolol tartrate 12.5 milliGRAM(s) Oral two times a day  montelukast 10 milliGRAM(s) Oral at bedtime  tamsulosin 0.4 milliGRAM(s) Oral at bedtime  tiotropium 18 MICROgram(s) Capsule 1 Capsule(s) Inhalation daily    MEDICATIONS  (PRN):  ALBUTerol    90 MICROgram(s) HFA Inhaler 1 Puff(s) Inhalation every 12 hours PRN Shortness of Breath and/or Wheezing  dextrose 40% Gel 15 Gram(s) Oral once PRN Blood Glucose LESS THAN 70 milliGRAM(s)/deciliter  glucagon  Injectable 1 milliGRAM(s) IntraMuscular once PRN Glucose LESS THAN 70 milligrams/deciliter        T(C): 37.1 (10-28-20 @ 12:00), Max: 37.1 (10-28-20 @ 12:00)  HR: 87 (10-28-20 @ 15:00) (58 - 106)  BP: 112/63 (10-28-20 @ 15:00) (92/46 - 148/68)  RR: 26 (10-28-20 @ 15:00) (1 - 31)  SpO2: 98% (10-28-20 @ 15:00) (98% - 100%)  Wt(kg): --    PHYSICAL EXAM:  GENERAL: NAD, well-groomed, well-developed  HEAD:  Atraumatic, Normocephalic  NECK: Supple, No JVD, Normal thyroid  CHEST/LUNG: Clear to percussion bilaterally; No rales, rhonchi, wheezing, or rubs  HEART: Regular rate and rhythm; No murmurs, rubs, or gallops  ABDOMEN: Soft, Nontender, Nondistended; Bowel sounds present  EXTREMITIES:  2+ Peripheral Pulses, No clubbing, cyanosis, or edema  SKIN: No rashes or lesions    CAPILLARY BLOOD GLUCOSE      POCT Blood Glucose.: 234 mg/dL (28 Oct 2020 12:40)  POCT Blood Glucose.: 213 mg/dL (28 Oct 2020 08:28)  POCT Blood Glucose.: 305 mg/dL (27 Oct 2020 23:15)  POCT Blood Glucose.: 292 mg/dL (27 Oct 2020 18:41)  POCT Blood Glucose.: 290 mg/dL (27 Oct 2020 17:09)  POCT Blood Glucose.: 312 mg/dL (27 Oct 2020 15:39)                            11.5   7.54  )-----------( 172      ( 28 Oct 2020 05:34 )             35.7       CMP:  10-28 @ 05:34  SGPT 21  Albumin 3.2   Alk Phos 78   Anion Gap 4   SGOT 12   Total Bili 0.3   BUN 24   Calcium Total 9.4   CO2 33   Chloride 103   Creatinine 1.50   eGFR if AA 50   eGFR if non AA 43   Glucose 252   Potassium 4.8   Protein 6.5   Sodium 140      Thyroid Function Tests:      Diabetes Tests:       Radiology:

## 2020-10-28 NOTE — PROGRESS NOTE ADULT - ASSESSMENT
79 y/o M w/ pmh of COPD (on home o2 3L and bipap qhs), eosinophilic asthma, cad (s/p cabg 2004), s/p 2 stents place at [place], D s/p LLE stents (11/2019(, hld, dm2, bph, previous cardiac arrest 2/2 to hypercarbic resp failure 2/2 to COPD exacerbation, presented to [facility] on 10/27/2020 for SOB pt was found to be in hypercarbic resp failure.    -Neuro: Mental status stable and currently protecting his airway otherwise no acute issues at this time  -Cardiac: CAD s/p stents cont plavix/asa, HF/htn cont lopressor/losartan, maintain MAP >65  -Resp: Acute Hypercarbic resp failure 2/2 to underlying COPD/eosinophilic asthma with improvement on bipap cont   24/6 at 30% fio2 repeat ABG, cont duonebs/steroids  -GI: NPO while on bipap, GI ppx w/ protonix  -Renal: mild MERRILL monitor for now, cont to monitor trend renal functions and alphonse whitfield played monitor I&O  -ID: Cont IV Rocephin/azithro emperically f/u on cx  -Heme: DVT ppx w/ lovenox  -Endo: DM cont ISS  -Dispo: 81 y/o M w/ pmh of COPD (on home o2 3L and bipap qhs), eosinophilic asthma, cad (s/p cabg 2004), s/p 2 stents place at [place], [facility] s/p LLE stents (11/2019(, hld, dm2, bph, previous cardiac arrest 2/2 to hypercarbic resp failure 2/2 to COPD exacerbation, presented to [facility] on 10/27/2020 for SOB pt was found to be in hypercarbic resp failure.    -Neuro: Mental status stable and currently protecting his airway otherwise no acute issues at this time  -Cardiac: CAD s/p stents cont plavix/asa, HF/htn cont lopressor/losartan, maintain MAP >65  -Resp: Acute Hypercarbic resp failure 2/2 to underlying COPD/eosinophilic asthma with improvement on bipap cont 24/6 at 30% fio2 repeat ABG shows improvement in acidosis and hypercarbia. Dc duonebs and methylprednisolone.   -GI: NPO while on bipap, GI ppx w/ protonix  -Renal: mild MERRILL monitor for now, cont to monitor trend renal functions and alphonse whitfield played monitor I&O  -ID: Cont IV Rocephin/azithro emperically f/u on cx  -Heme: DVT ppx w/ lovenox  -Endo: DM cont ISS  -Dispo: 81 y/o M w/ pmh of COPD (on home o2 3L and bipap qhs), eosinophilic asthma, cad (s/p cabg 2004), s/p 2 stents place at [place], [facility] s/p LLE stents (11/2019(, hld, dm2, bph, previous cardiac arrest 2/2 to hypercarbic resp failure 2/2 to COPD exacerbation, presented to [facility] on 10/27/2020 for SOB pt was found to be in hypercarbic resp failure.    -Neuro: Mental status stable and currently protecting his airway otherwise no acute issues at this time  -Cardiac: CAD s/p stents cont plavix/asa, HF/htn cont lopressor/losartan, maintain MAP >65  -Resp: Acute Hypercarbic resp failure 2/2 to underlying COPD/eosinophilic asthma put on bipap set 24/6 at 30% fio2 repeat ABG shows improvement in acidosis and hypercarbia. Dc duonebs. Decrease methylprednisolone to 40mg qd. Continue Azithro 500mg qd until 10/31, Symbicort and Albuterol prn  -GI: NPO while on bipap, GI ppx w/ protonix  -Renal: mild MERRILL monitor for now, cont to monitor trend renal functions and alphonse whitfield played monitor I&O  -ID: Cont Azithro 500mg qd until 10/31. f/u on cx  -Heme: DVT ppx w/ lovenox  -Endo: DM cont ISS. Endo consulted Dr Perlman as pt has kidney injury and home diabetic medications may need to be discontinued and pt may need to be insulin dependent  -Dispo: medically stable, no respiratory compromise. Dc pending

## 2020-10-28 NOTE — PROGRESS NOTE ADULT - SUBJECTIVE AND OBJECTIVE BOX
INTERVAL HPI/OVERNIGHT EVENTS:  Patient seen and examined at bedside. States he feels well, denies any CP, OB, abd pain. States he wants to go home.    MEDICATIONS  (STANDING):  aspirin enteric coated 81 milliGRAM(s) Oral daily  atorvastatin 20 milliGRAM(s) Oral at bedtime  budesonide 160 MICROgram(s)/formoterol 4.5 MICROgram(s) Inhaler 2 Puff(s) Inhalation two times a day  clopidogrel Tablet 75 milliGRAM(s) Oral daily  dextrose 5%. 1000 milliLiter(s) (50 mL/Hr) IV Continuous <Continuous>  dextrose 50% Injectable 12.5 Gram(s) IV Push once  dextrose 50% Injectable 25 Gram(s) IV Push once  dextrose 50% Injectable 25 Gram(s) IV Push once  enoxaparin Injectable 40 milliGRAM(s) SubCutaneous daily  fluticasone propionate 50 MICROgram(s)/spray Nasal Spray 1 Spray(s) Both Nostrils two times a day  insulin glargine Injectable (LANTUS) 15 Unit(s) SubCutaneous at bedtime  insulin lispro (ADMELOG) corrective regimen sliding scale   SubCutaneous three times a day before meals  insulin lispro (ADMELOG) corrective regimen sliding scale   SubCutaneous at bedtime  insulin lispro Injectable (ADMELOG) 5 Unit(s) SubCutaneous three times a day before meals  losartan 25 milliGRAM(s) Oral daily  methylPREDNISolone sodium succinate Injectable 40 milliGRAM(s) IV Push daily  metoprolol tartrate 12.5 milliGRAM(s) Oral two times a day  montelukast 10 milliGRAM(s) Oral at bedtime  tamsulosin 0.4 milliGRAM(s) Oral at bedtime  tiotropium 18 MICROgram(s) Capsule 1 Capsule(s) Inhalation daily    MEDICATIONS  (PRN):  ALBUTerol    90 MICROgram(s) HFA Inhaler 1 Puff(s) Inhalation every 12 hours PRN Shortness of Breath and/or Wheezing  dextrose 40% Gel 15 Gram(s) Oral once PRN Blood Glucose LESS THAN 70 milliGRAM(s)/deciliter  glucagon  Injectable 1 milliGRAM(s) IntraMuscular once PRN Glucose LESS THAN 70 milligrams/deciliter      Allergies    No Known Allergies    Intolerances    shellfish (Nausea)      ROS:  CONSTITUTIONAL: No weakness, fevers or chills  EYES/ENT: No visual changes;  No vertigo or throat pain   NECK: No pain or stiffness  RESPIRATORY: No cough, wheezing, hemoptysis; No shortness of breath  CARDIOVASCULAR: No chest pain or palpitations  GASTROINTESTINAL: No abdominal or epigastric pain. No nausea, vomiting, or hematemesis  GENITOURINARY: No dysuria, frequency or hematuria  NEUROLOGICAL: No numbness or weakness  SKIN: No itching, burning, rashes, or lesions   All other review of systems is negative unless indicated above.    Vital Signs Last 24 Hrs  T(C): 37.1 (28 Oct 2020 12:00), Max: 37.1 (28 Oct 2020 12:00)  T(F): 98.8 (28 Oct 2020 12:00), Max: 98.8 (28 Oct 2020 12:00)  HR: 87 (28 Oct 2020 15:00) (58 - 106)  BP: 112/63 (28 Oct 2020 15:00) (92/46 - 148/68)  BP(mean): 80 (28 Oct 2020 15:00) (67 - 108)  RR: 26 (28 Oct 2020 15:00) (1 - 31)  SpO2: 98% (28 Oct 2020 15:00) (98% - 100%)    10-27 @ 07:01  -  10-28 @ 07:00  --------------------------------------------------------  IN: 240 mL / OUT: 750 mL / NET: -510 mL    10-28 @ 07:01  -  10-28 @ 15:23  --------------------------------------------------------  IN: 960 mL / OUT: 1500 mL / NET: -540 mL      Physical Exam:  General: WN/WD NAD  Neurology: A&Ox3, nonfocal, ZIMMERMAN x 4  Respiratory: Diminished breath sounds throughout, +end expiratory wheezing RUL and LLL  CV: RRR, S1S2  Abdominal: Soft, NT, ND +BS  Extremities: No edema, + peripheral pulses      LABS:                        11.5   7.54  )-----------( 172      ( 28 Oct 2020 05:34 )             35.7     10-28    140  |  103  |  24<H>  ----------------------------<  252<H>  4.8   |  33<H>  |  1.50<H>    Ca    9.4      28 Oct 2020 05:34  Phos  2.8     10-28    TPro  6.5  /  Alb  3.2<L>  /  TBili  0.3  /  DBili  x   /  AST  12<L>  /  ALT  21  /  AlkPhos  78  10-28    PT/INR - ( 28 Oct 2020 05:34 )   PT: 12.2 sec;   INR: 1.04 ratio         PTT - ( 27 Oct 2020 06:34 )  PTT:29.6 sec      RADIOLOGY & ADDITIONAL TESTS:

## 2020-10-28 NOTE — PROGRESS NOTE ADULT - ASSESSMENT
cont rx   cont rx      COMMODORE YEU [facility] P [phone]  1940 DOA 10/27/2020 DR AMANDA KERN           REVIEW OF SYMPTOMS      Able to give ROS  Yes     RELIABLE No   CONSTITUTIONAL Weakness Yes  Chills No Vision changes No  ENDOCRINE No unexplained hair loss No heat or cold intolerance    ALLERGY No hives  Sore throat No   RESP Coughing blood no  Shortness of breath YES   NEURO No Headache  Confusion Pain neck No   CARDIAC No Chest pain No Palpitations   GI No Pain abdomen NO   Vomiting NO     PHYSICAL EXAM    HEENT Unremarkable PERRLA atraumatic   RESP Fair air entry EXP prolonged    Harsh breath sound Resp distres mild   CARDIAC S1 S2 No S3     NO JVD    ABDOMEN SOFT BS PRESENT NOT DISTENDED No hepatosplenomegaly PEDAL EDEMA present No calf tenderness  NO rash     PT DATA/BEST PRACTICE  ALLERGY   shellfish           WT                 10/27/2020 102 k              BMI                  10/27/2020 23                      ADVANCED DIRECTIVE     HEAD OF BED ELEVATION Yes      DVT PROPHYLAXIS.   lvnx 40 (10/27)      DIET. npo (10/27) --> dash (10/28)                                                                            MCCORMICK PROPHYLAXIS.   INFECTION PPLX                                                    OXYGENATION.   10/28/2020 3l 100%       IV F. VITALS.   10/28/2020 afeb 60 120/70   10/28/2020 3 24/6/bur 18 0.3       MICROBIO.   COVID 10/27/2020 pcr negv            ANTIBIO.   AZITHRO (10/27) (Dr Kern)   ROCEPHIN (10/27) (Dr Kern)        ABG     10/28/2020 8a 24/6/.3 737/56/125   10/27 2p 24/6/.3/18 732/57/73      LABS.   Cr 10/27-10/28/2020 Cr 1.4 - 1.5      PATIENT SUMMARY.   79 m former smoker PMH HTN, DM2 (on home Metformin and glipizide), COPD (2L home/ BIPAP at night), CAD with 3v CABG , Stent 2019 HLD, 10/26/2018 ECHO ef 55% hx hypercapnic resp failure with ho intubation c/b with cardiac arrest (2018) with ho recurrent admissions GOLD D last admitted 2020 required intubated  now admitted 10/27/2020 with resp distress with aoc type 2 resp failure sec to copd ex   Pulm consulted 10/27/2020     PROBLEM/ASSESSMENT/RECOMMENDATIONS.    ACUTE ON CHRONIC TYPE 2 RESP FAILUER POA 10/27/2020  Likel cause of aoc resp failure Type 2 is copd ex   Negv D-dimer makes vte less likely  Negv Procalc poa makes bacterial infection less likely Pt also has a negv cxr   ABG has improved on bpap   Pt was taken off bpap this am and started on diet       COPD ex  10/27/2020  Added spiriva duoneb pulmicort   10/27/2020 When awake will change neb to mdi   10/28/2020 Is on solumed 40.2 lama laba bassam lisa ics azithro and rocephin  Cont rx    RO RESP TRACT INFECTION  W 10/27/2020 w 11.9   Pr 10/27/2020 Pr negv   CXR 10/27/2020 cxr sternotomy No ac infiltr  A/R   10/27/2020 is on rocephin and azithro started by hospitalist  10/27/2020 Suggest deescalate to azithro alone       TIME SPENT   Over 25 minutes aggregate care time spent on encounter; activities included   direct patient care, counseling and/or coordinating care reviewing notes, lab data/ imaging , discussion with multidisciplinary team/ patient  /family and explaining in detail risks, benefits, alternatives  of the recommendations     COMMBRIANNE TURNER [facility] P [phone]  1940 DOA 10/27/2020 DR AMANDA KERN

## 2020-10-28 NOTE — DISCHARGE NOTE PROVIDER - NSDCCPCAREPLAN_GEN_ALL_CORE_FT
PRINCIPAL DISCHARGE DIAGNOSIS  Diagnosis: Acute respiratory failure, unsp w hypoxia or hypercapnia  Assessment and Plan of Treatment: -Improved after being placed on BiPAP. Continue current BiPAP settings. Continue slow prednisone taper (20mg for 10 days, then 15 mg for 10 days and then 10 mg for 10 days)  -Continue Azithromycin 250mg on Mondays/Wed/Fri  -Please follow up with Pulm- Dr. Marcelino within 1 month of discharge      SECONDARY DISCHARGE DIAGNOSES  Diagnosis: Diabetes Mellitus, Type II  Assessment and Plan of Treatment: A1c 6.2, follow up [facility] Endocrinology within 2 weeks of discharge  Continue metformin 1000mg BID  Continue novolog sliding scale before meals while on steroids     PRINCIPAL DISCHARGE DIAGNOSIS  Diagnosis: Acute respiratory failure, unsp w hypoxia or hypercapnia  Assessment and Plan of Treatment: -Improved after being placed on BiPAP. Continue current BiPAP settings. Continue slow prednisone taper (20mg for 10 days, then 15 mg for 10 days and then 10 mg for 10 days)  -Continue Azithromycin 500mg daily until 10/31/20, then switch to 250mg on Mondays/Wednesdays/Fridays  -Please follow up with Pulm- Dr. Marcelino within 1 month of discharge      SECONDARY DISCHARGE DIAGNOSES  Diagnosis: Diabetes Mellitus, Type II  Assessment and Plan of Treatment: A1c 6.2, follow up [facility] Endocrinology within 2 weeks of discharge  Continue metformin 1000mg BID  Continue novolog sliding scale before meals while on steroids

## 2020-10-28 NOTE — DIETITIAN INITIAL EVALUATION ADULT. - OTHER INFO
79 y/o M w/ pmh of COPD (on home o2 3L and bipap qhs), eosinophilic asthma, cad (s/p cabg 2004), s/p 2 stents place at [place], [facility] s/p LLE stents (11/2019(, hld, dm2, bph, previous cardiac arrest 2/2 to hypercarbic resp failure 2/2 to COPD exacerbation, presented to [facility] on 10/27/2020 for SOB pt was found to be in hypercarbic resp failure.  Pt seen at bedside, is currently asleep, not disturbed.  Pt last admitted here 9/2020.  Wt appears stable since then.

## 2020-10-28 NOTE — PROGRESS NOTE ADULT - SUBJECTIVE AND OBJECTIVE BOX
[facility] -- Saud Aguilar, Dani, Génesis, Carroll Haney Savella  Office # [phone]      Follow Up:  resp failure, HF    Subjective/Observations: Patient seen and examined. Events noted. Resting comfortably in bed. No complaints of chest pain, dyspnea, or palpitations reported. No signs of orthopnea or PND.       REVIEW OF SYSTEMS: All other review of systems is negative unless indicated above    PAST MEDICAL & SURGICAL HISTORY:  PVD (peripheral vascular disease)    Hypertension    COPD (chronic obstructive pulmonary disease)  on 2L at home and BiPAP at night; intubated 6/18    Osteomyelitis    Dyslipidemia    CAD (Coronary Artery Disease)  s/p 3v CABG 2004; stents placed in [place] in 2019    Diabetes Mellitus, Type II    S/P primary angioplasty with coronary stent    Compound fracture  left leg    CABG (Coronary Artery Bypass Graft)  2004        MEDICATIONS  (STANDING):  albuterol/ipratropium for Nebulization 3 milliLiter(s) Nebulizer every 4 hours  aspirin enteric coated 81 milliGRAM(s) Oral daily  atorvastatin 20 milliGRAM(s) Oral at bedtime  azithromycin  IVPB      azithromycin  IVPB 500 milliGRAM(s) IV Intermittent every 24 hours  budesonide 160 MICROgram(s)/formoterol 4.5 MICROgram(s) Inhaler 2 Puff(s) Inhalation two times a day  cefTRIAXone   IVPB 1000 milliGRAM(s) IV Intermittent every 24 hours  clopidogrel Tablet 75 milliGRAM(s) Oral daily  dextrose 5%. 1000 milliLiter(s) (50 mL/Hr) IV Continuous <Continuous>  dextrose 50% Injectable 12.5 Gram(s) IV Push once  dextrose 50% Injectable 25 Gram(s) IV Push once  dextrose 50% Injectable 25 Gram(s) IV Push once  enoxaparin Injectable 40 milliGRAM(s) SubCutaneous daily  fluticasone propionate 50 MICROgram(s)/spray Nasal Spray 1 Spray(s) Both Nostrils two times a day  insulin glargine Injectable (LANTUS) 15 Unit(s) SubCutaneous at bedtime  insulin lispro (ADMELOG) corrective regimen sliding scale   SubCutaneous three times a day before meals  insulin lispro (ADMELOG) corrective regimen sliding scale   SubCutaneous at bedtime  insulin lispro Injectable (ADMELOG) 5 Unit(s) SubCutaneous three times a day before meals  losartan 25 milliGRAM(s) Oral daily  methylPREDNISolone sodium succinate Injectable 40 milliGRAM(s) IV Push every 12 hours  metoprolol tartrate 12.5 milliGRAM(s) Oral two times a day  montelukast 10 milliGRAM(s) Oral at bedtime  tamsulosin 0.4 milliGRAM(s) Oral at bedtime  tiotropium 18 MICROgram(s) Capsule 1 Capsule(s) Inhalation daily    MEDICATIONS  (PRN):  dextrose 40% Gel 15 Gram(s) Oral once PRN Blood Glucose LESS THAN 70 milliGRAM(s)/deciliter  glucagon  Injectable 1 milliGRAM(s) IntraMuscular once PRN Glucose LESS THAN 70 milligrams/deciliter      Allergies    No Known Allergies    Intolerances    shellfish (Nausea)          Vital Signs Last 24 Hrs  T(C): 36.7 (28 Oct 2020 08:00), Max: 36.7 (28 Oct 2020 08:00)  T(F): 98 (28 Oct 2020 08:00), Max: 98 (28 Oct 2020 08:00)  HR: 88 (28 Oct 2020 09:00) (58 - 121)  BP: 124/98 (28 Oct 2020 09:00) (106/65 - 178/84)  BP(mean): 108 (28 Oct 2020 09:00) (79 - 108)  RR: 22 (28 Oct 2020 09:00) (1 - 24)  SpO2: 100% (28 Oct 2020 09:00) (97% - 110%)    I&O's Summary    27 Oct 2020 07:01  -  28 Oct 2020 07:00  --------------------------------------------------------  IN: 240 mL / OUT: 750 mL / NET: -510 mL          PHYSICAL EXAM:  TELE:   Constitutional: NAD, awake    HEENT: Moist Mucous Membranes, Anicteric  Pulmonary: Decreased breath sounds b/l. No rales, crackles or wheeze appreciated.   Cardiovascular: Regular, S1 and S2, No murmurs, rubs, gallops or clicks  Gastrointestinal: Bowel Sounds present, soft, nontender.   Lymph: No peripheral edema. No lymphadenopathy.  Skin: No visible rashes or ulcers.  Psych:  Mood & affect appropriate for situation    LABS: All Labs Reviewed:                        11.5   7.54  )-----------( 172      ( 28 Oct 2020 05:34 )             35.7                         13.4   11.90 )-----------( 227      ( 27 Oct 2020 06:34 )             42.1     28 Oct 2020 05:34    140    |  103    |  24     ----------------------------<  252    4.8     |  33     |  1.50   27 Oct 2020 06:34    139    |  103    |  17     ----------------------------<  216    4.8     |  33     |  1.40     Ca    9.4        28 Oct 2020 05:34  Ca    9.4        27 Oct 2020 06:34  Phos  2.8       28 Oct 2020 05:34    TPro  6.5    /  Alb  3.2    /  TBili  0.3    /  DBili  x      /  AST  12     /  ALT  21     /  AlkPhos  78     28 Oct 2020 05:34  TPro  7.5    /  Alb  3.7    /  TBili  0.5    /  DBili  x      /  AST  34     /  ALT  23     /  AlkPhos  93     27 Oct 2020 06:34    PT/INR - ( 28 Oct 2020 05:34 )   PT: 12.2 sec;   INR: 1.04 ratio         PTT - ( 27 Oct 2020 06:34 )  PTT:29.6 sec  CARDIAC MARKERS ( 27 Oct 2020 06:34 )  <.015 ng/mL / x     / x     / x     / x

## 2020-10-28 NOTE — DISCHARGE NOTE PROVIDER - CARE PROVIDER_API CALL
Perlman, Craig D  [address], [address]  [place] [address]  Phone: [phone]  Fax: [phone]  Follow Up Time:     Oscar Marcelino)  Critical Care Medicine; Pulmonary Disease  [address], [address]  [place], [place] [id number]  Phone: [phone]  Fax: [phone]  Follow Up Time:

## 2020-10-28 NOTE — PROGRESS NOTE ADULT - ASSESSMENT
79 yo M with PMH of COPD (On home O2 3L and BIPAP at while sleeping regardless of time of day), asthma, CAD (w/ 3v CABG ), s/p 2 recent coronary stents at [place], PVD s/p LLE stent (2019), HLD, DM2 (on Metformin and insulin while on steroid tapers), BPH, hx Hypercapnic Respiratory Failure/Cardiac Arrest requiring intubation (), with multiple frequent admissions, last in 2020 Respiratory failure.  Presents with progressive worsening dyspnea. Found to have Acute hypercapnic respiratory failure 2/2 COPD exacerbation. Cardio consulted for dyspnea r/o CHF    - Pt has hx of multiple admissions for acute hypercapnic respiratory, readmitted again for acute hypercapnic respiratory likely 2/2 COPD exacerbation  - AB. at presentation  - now off of bipap. will need PRN  - No clear evidence of acute ischemia   - No hx of CHF. BMP unimpressive. No meaningful evidence of volume overload on exam. Lungs clear on CXR. No need for diuresis at this time.  -  TTE 2020: preserved left ventricular systolic function. Doppler evidence of grade 1 diastolic dysfunction. The IVC appears dilated at 2.6 cm, though collapses >50% with respiration. No significant pericardial effusion    - bp controlled. cont current meds.   - monitor and replete lytes, keep K>4, Mg>2  - Continue home cardiac medications.  - Other cardiovascular workup will depend on clinical course.     - Patient at high risk for decompensation given the above comorbidities and tenuous respiratory status.  I have personally spent >35 minutes  of critical care time in examining patient, reviewing chart, discussing care/management with patient/family, and  ICU team.

## 2020-10-28 NOTE — DIETITIAN INITIAL EVALUATION ADULT. - CONTINUE CURRENT NUTRITION CARE PLAN
yes/Monitor po intake, diet tolerance, bowel function, s/s GI distress.  Assess diet education needs.

## 2020-10-28 NOTE — DISCHARGE NOTE PROVIDER - NSDCFUSCHEDAPPT_GEN_ALL_CORE_FT
YUE COTTO ; 11/09/2020 ; P DisEmerg [address] [place] [address] Y[address] ; 11/13/2020 ; [facility] Med Pulm [address] [facility]  YUE COTTO ; 12/07/2020 ; [facility] Surg Vasc [address] ; 12/07/2020 ; [facility] Surg [facility]asc [address] ; 12/07/2020 ; [facility] Surg Vasc [address] ; 12/14/2020 ; [facility] Surg [address] [address] ; 12/14/2020 ; P Surg Vasc [address]

## 2020-10-28 NOTE — PROGRESS NOTE ADULT - ASSESSMENT
79 yo M with PMH of COPD (On home O2 3L and BIPAP at while sleeping regardless of time of day), asthma, CAD (w/ 3v CABG 2004), s/p 2 recent coronary stents at [place], [place] s/p LLE stent (11/2019), HLD, DM2 (on Metformin), BPH, hx Hypercapnic Respiratory Failure/Cardiac Arrest requiring intubation (6/18), with multiple frequent admissions, last in 8/2020 for acute hypercapnic respiratory failure, COPD exacerbation presents with acute hypercapnic respiratory failure, intubated in the ED.

## 2020-10-28 NOTE — DISCHARGE NOTE PROVIDER - NSDCMRMEDTOKEN_GEN_ALL_CORE_FT
albuterol 2.5 mg/3 mL (0.083%) inhalation solution: 3 milliliter(s) inhaled every 6 hours, As Needed  aspirin 81 mg oral delayed release tablet: 1 tab(s) orally once a day  Breo Ellipta 200 mcg-25 mcg/inh inhalation powder: 1 puff(s) inhaled once a day  clopidogrel 75 mg oral tablet: 1 tab(s) orally once a day  glipiZIDE 2.5 mg oral tablet, extended release: 1 tab(s) orally once a day  Incruse Ellipta 62.5 mcg/inh inhalation powder: 1 puff(s) inhaled every 24 hours  losartan 25 mg oral tablet: 1 tab(s) orally once a day  metFORMIN 1000 mg oral tablet: 1 tab(s) orally 2 times a day  Metoprolol Tartrate 25 mg oral tablet: 0.5 tab(s) orally 2 times a day  montelukast 10 mg oral tablet: 1 tab(s) orally once a day (at bedtime)  rosuvastatin 20 mg oral tablet: 1 tab(s) orally once a day (at bedtime)  tamsulosin 0.4 mg oral capsule: 1 cap(s) orally once a day (at bedtime)  Ventolin HFA 90 mcg/inh inhalation aerosol: 2 puff(s) inhaled every 6 hours, As Needed   albuterol 2.5 mg/3 mL (0.083%) inhalation solution: 3 milliliter(s) inhaled every 6 hours, As Needed  aspirin 81 mg oral delayed release tablet: 1 tab(s) orally once a day  azithromycin 250 mg oral tablet: 1 tab(s) orally Monday, Wednesday, and Friday   Breo Ellipta 200 mcg-25 mcg/inh inhalation powder: 1 puff(s) inhaled once a day  clopidogrel 75 mg oral tablet: 1 tab(s) orally once a day  fluticasone 50 mcg/inh nasal spray: 1 spray(s) nasal 2 times a day  Incruse Ellipta 62.5 mcg/inh inhalation powder: 1 puff(s) inhaled every 24 hours  losartan 25 mg oral tablet: 1 tab(s) orally once a day  metFORMIN 1000 mg oral tablet: 1 tab(s) orally 2 times a day  Metoprolol Tartrate 25 mg oral tablet: 0.5 tab(s) orally 2 times a day  montelukast 10 mg oral tablet: 1 tab(s) orally once a day (at bedtime)  NovoLOG 100 units/mL subcutaneous solution: subcutaneous 3 times a day (before meals)  predniSONE 5 mg oral tablet: 4 tab(s) orally once a day x 10 days  3 tab(s) orally once a day x 10 days  2 tab(s) orally once a day x 10 days    rosuvastatin 20 mg oral tablet: 1 tab(s) orally once a day (at bedtime)  tamsulosin 0.4 mg oral capsule: 1 cap(s) orally once a day (at bedtime)  Ventolin HFA 90 mcg/inh inhalation aerosol: 2 puff(s) inhaled every 6 hours, As Needed   albuterol 2.5 mg/3 mL (0.083%) inhalation solution: 3 milliliter(s) inhaled every 6 hours, As Needed  aspirin 81 mg oral delayed release tablet: 1 tab(s) orally once a day  azithromycin 250 mg oral tablet: 1 tab(s) orally Monday, Wednesday, and Friday   Azithromycin 3 Day Dose Pack 500 mg oral tablet: 1 tab(s) orally once a day   Breo Ellipta 200 mcg-25 mcg/inh inhalation powder: 1 puff(s) inhaled once a day  clopidogrel 75 mg oral tablet: 1 tab(s) orally once a day  fluticasone 50 mcg/inh nasal spray: 1 spray(s) nasal 2 times a day  Incruse Ellipta 62.5 mcg/inh inhalation powder: 1 puff(s) inhaled every 24 hours  losartan 25 mg oral tablet: 1 tab(s) orally once a day  metFORMIN 1000 mg oral tablet: 1 tab(s) orally 2 times a day  Metoprolol Tartrate 25 mg oral tablet: 0.5 tab(s) orally 2 times a day  montelukast 10 mg oral tablet: 1 tab(s) orally once a day (at bedtime)  NovoLOG 100 units/mL subcutaneous solution: subcutaneous 3 times a day (before meals)  predniSONE 5 mg oral tablet: 4 tab(s) orally once a day x 10 days  3 tab(s) orally once a day x 10 days  2 tab(s) orally once a day x 10 days    rosuvastatin 20 mg oral tablet: 1 tab(s) orally once a day (at bedtime)  tamsulosin 0.4 mg oral capsule: 1 cap(s) orally once a day (at bedtime)  Ventolin HFA 90 mcg/inh inhalation aerosol: 2 puff(s) inhaled every 6 hours, As Needed

## 2020-10-28 NOTE — DISCHARGE NOTE PROVIDER - HOSPITAL COURSE
HPI:  79 yo M with PMH of COPD (On home O2 3L and BIPAP at while sleeping regardless of time of day), asthma, CAD (w/ 3v CABG 2004), s/p 2 recent coronary stents at [place], [place] s/p LLE stent (11/2019), HLD, DM2 (on Metformin and insulin while on steroid tapers), BPH, hx Hypercapnic Respiratory Failure/Cardiac Arrest requiring intubation (6/18), with multiple frequent admissions, last in 8/2020 Respiratory failure. Wife is at bedside, . pt c/o sudden onset of sob, was not improving on bipap, was becoming more lethargic, admitted fo hypercapnic respiratory faulure with c02 of 80, will continue on bipap   (27 Oct 2020 10:01)      ---  HOSPITAL COURSE:   Admitted for acute hypercarbic respiratory failure placed on BiPAP with greater driving pressure. Patient rapidly improved with respiratory support proven on serial ABGs. Patient no longer complaining of sob. Patient seen and examined on day of discharge and is clinically optimized for discharge.    ---  CONSULTANTS:   Pulm- Dr Dejesus   Endo- Dr Perlman   Cardio- Dr Haney     ---  TIME SPENT:  I, the attending physician, was physically present for the key portions of the evaluation and management (E/M) service provided. The total amount of time spent reviewing the hospital notes, laboratory values, imaging findings, assessing/counseling the patient, discussing with consultant physicians, social work, nursing staff was -- minutes    ---  Primary care provider was made aware of plan for discharge:      [  ] NO     [  ] YES   HPI:  81 yo M with PMH of COPD (On home O2 3L and BIPAP at while sleeping regardless of time of day), asthma, CAD (w/ 3v CABG 2004), s/p 2 recent coronary stents at [place], [place] s/p LLE stent (11/2019), HLD, DM2 (on Metformin and insulin while on steroid tapers), BPH, hx Hypercapnic Respiratory Failure/Cardiac Arrest requiring intubation (6/18), with multiple frequent admissions, last in 8/2020 Respiratory failure. Wife is at bedside, . pt c/o sudden onset of sob, was not improving on bipap, was becoming more lethargic, admitted fo hypercapnic respiratory faulure with c02 of 80, will continue on bipap   (27 Oct 2020 10:01)      ---  HOSPITAL COURSE:   Admitted for acute hypercarbic respiratory failure placed on BiPAP with greater driving pressure. Patient rapidly improved with respiratory support proven on serial ABGs. Patient no longer complaining of sob. Patient had MERRILL with slow stable resolution likely underlying CKD. Endocrinology was consulted for insulin regimen adjustment and to establish plan for outpatient followup. Since patient has kidney injury and will require long term steroid use for COPD changes will be made to his diabetes management with outpatient Endocrinology followup at Dr Perlman's office. Patient seen and examined on day of discharge and is clinically optimized for discharge.     ---  CONSULTANTS:   Pulm- Dr Dejesus   Endo- Dr Perlman   Cardio- Dr Haney     ---  TIME SPENT:  I, the attending physician, was physically present for the key portions of the evaluation and management (E/M) service provided. The total amount of time spent reviewing the hospital notes, laboratory values, imaging findings, assessing/counseling the patient, discussing with consultant physicians, social work, nursing staff was -- minutes    ---  Primary care provider was made aware of plan for discharge:      [  ] NO     [  ] YES   HPI:  81 yo M with PMH of COPD (On home O2 3L and BIPAP at while sleeping regardless of time of day), asthma, CAD (w/ 3v CABG 2004), s/p 2 recent coronary stents at [place], [place] s/p LLE stent (11/2019), HLD, DM2 (on Metformin and insulin while on steroid tapers), BPH, hx Hypercapnic Respiratory Failure/Cardiac Arrest requiring intubation (6/18), with multiple frequent admissions, last in 8/2020 Respiratory failure. Wife is at bedside, . pt c/o sudden onset of sob, was not improving on bipap, was becoming more lethargic, admitted fo hypercapnic respiratory faulure with c02 of 80, will continue on bipap   (27 Oct 2020 10:01)      ---  HOSPITAL COURSE:   Admitted for acute hypercarbic respiratory failure placed on BiPAP with greater driving pressure. Patient rapidly improved with respiratory support proven on serial ABGs. Patient no longer complaining of sob. Patient with likely underlying CKD. Endocrinology was consulted for insulin regimen adjustment and to establish plan for outpatient followup. Since patient has chronic kidney disease and will require long term steroid use for COPD changes will be made to his diabetes management with outpatient Endocrinology followup at Dr Perlman's office. Patient seen and examined on day of discharge and is clinically optimized for discharge.     ---  CONSULTANTS:   Pulm- Dr Dejesus   Endo- Dr Perlman   Cardio- Dr Haney     ---  TIME SPENT:  I, the attending physician, was physically present for the key portions of the evaluation and management (E/M) service provided. The total amount of time spent reviewing the hospital notes, laboratory values, imaging findings, assessing/counseling the patient, discussing with consultant physicians, social work, nursing staff was 44 minutes

## 2020-10-29 ENCOUNTER — TRANSCRIPTION ENCOUNTER (OUTPATIENT)
Age: 80
End: 2020-10-29

## 2020-10-29 VITALS
HEART RATE: 89 BPM | DIASTOLIC BLOOD PRESSURE: 69 MMHG | OXYGEN SATURATION: 94 % | SYSTOLIC BLOOD PRESSURE: 115 MMHG | RESPIRATION RATE: 22 BRPM

## 2020-10-29 LAB
ALBUMIN SERPL ELPH-MCNC: 3.1 G/DL — LOW (ref 3.3–5)
ALP SERPL-CCNC: 73 U/L — SIGNIFICANT CHANGE UP (ref 40–120)
ALT FLD-CCNC: 20 U/L — SIGNIFICANT CHANGE UP (ref 12–78)
ANION GAP SERPL CALC-SCNC: 2 MMOL/L — LOW (ref 5–17)
AST SERPL-CCNC: 11 U/L — LOW (ref 15–37)
BASOPHILS # BLD AUTO: 0.02 K/UL — SIGNIFICANT CHANGE UP (ref 0–0.2)
BASOPHILS NFR BLD AUTO: 0.2 % — SIGNIFICANT CHANGE UP (ref 0–2)
BILIRUB SERPL-MCNC: 0.3 MG/DL — SIGNIFICANT CHANGE UP (ref 0.2–1.2)
BUN SERPL-MCNC: 26 MG/DL — HIGH (ref 7–23)
CALCIUM SERPL-MCNC: 9.5 MG/DL — SIGNIFICANT CHANGE UP (ref 8.5–10.1)
CHLORIDE SERPL-SCNC: 105 MMOL/L — SIGNIFICANT CHANGE UP (ref 96–108)
CO2 SERPL-SCNC: 37 MMOL/L — HIGH (ref 22–31)
CREAT SERPL-MCNC: 1.4 MG/DL — HIGH (ref 0.5–1.3)
EOSINOPHIL # BLD AUTO: 0.04 K/UL — SIGNIFICANT CHANGE UP (ref 0–0.5)
EOSINOPHIL NFR BLD AUTO: 0.4 % — SIGNIFICANT CHANGE UP (ref 0–6)
GLUCOSE SERPL-MCNC: 79 MG/DL — SIGNIFICANT CHANGE UP (ref 70–99)
HCT VFR BLD CALC: 37.8 % — LOW (ref 39–50)
HGB BLD-MCNC: 11.9 G/DL — LOW (ref 13–17)
IMM GRANULOCYTES NFR BLD AUTO: 0.3 % — SIGNIFICANT CHANGE UP (ref 0–1.5)
LYMPHOCYTES # BLD AUTO: 2.24 K/UL — SIGNIFICANT CHANGE UP (ref 1–3.3)
LYMPHOCYTES # BLD AUTO: 22.6 % — SIGNIFICANT CHANGE UP (ref 13–44)
MAGNESIUM SERPL-MCNC: 2.3 MG/DL — SIGNIFICANT CHANGE UP (ref 1.6–2.6)
MCHC RBC-ENTMCNC: 28.2 PG — SIGNIFICANT CHANGE UP (ref 27–34)
MCHC RBC-ENTMCNC: 31.5 GM/DL — LOW (ref 32–36)
MCV RBC AUTO: 89.6 FL — SIGNIFICANT CHANGE UP (ref 80–100)
MONOCYTES # BLD AUTO: 1.18 K/UL — HIGH (ref 0–0.9)
MONOCYTES NFR BLD AUTO: 11.9 % — SIGNIFICANT CHANGE UP (ref 2–14)
NEUTROPHILS # BLD AUTO: 6.4 K/UL — SIGNIFICANT CHANGE UP (ref 1.8–7.4)
NEUTROPHILS NFR BLD AUTO: 64.6 % — SIGNIFICANT CHANGE UP (ref 43–77)
NRBC # BLD: 0 /100 WBCS — SIGNIFICANT CHANGE UP (ref 0–0)
PHOSPHATE SERPL-MCNC: 2.4 MG/DL — LOW (ref 2.5–4.5)
PLATELET # BLD AUTO: 209 K/UL — SIGNIFICANT CHANGE UP (ref 150–400)
POTASSIUM SERPL-MCNC: 3.9 MMOL/L — SIGNIFICANT CHANGE UP (ref 3.5–5.3)
POTASSIUM SERPL-SCNC: 3.9 MMOL/L — SIGNIFICANT CHANGE UP (ref 3.5–5.3)
PROCALCITONIN SERPL-MCNC: <0.05 NG/ML — HIGH (ref 0–0.04)
PROT SERPL-MCNC: 6.1 G/DL — SIGNIFICANT CHANGE UP (ref 6–8.3)
RBC # BLD: 4.22 M/UL — SIGNIFICANT CHANGE UP (ref 4.2–5.8)
RBC # FLD: 13.6 % — SIGNIFICANT CHANGE UP (ref 10.3–14.5)
SODIUM SERPL-SCNC: 144 MMOL/L — SIGNIFICANT CHANGE UP (ref 135–145)
WBC # BLD: 9.91 K/UL — SIGNIFICANT CHANGE UP (ref 3.8–10.5)
WBC # FLD AUTO: 9.91 K/UL — SIGNIFICANT CHANGE UP (ref 3.8–10.5)

## 2020-10-29 PROCEDURE — U0003: CPT

## 2020-10-29 PROCEDURE — 96374 THER/PROPH/DIAG INJ IV PUSH: CPT

## 2020-10-29 PROCEDURE — 99285 EMERGENCY DEPT VISIT HI MDM: CPT | Mod: 25

## 2020-10-29 PROCEDURE — 99232 SBSQ HOSP IP/OBS MODERATE 35: CPT | Mod: GC

## 2020-10-29 PROCEDURE — 80053 COMPREHEN METABOLIC PANEL: CPT

## 2020-10-29 PROCEDURE — 85730 THROMBOPLASTIN TIME PARTIAL: CPT

## 2020-10-29 PROCEDURE — 36600 WITHDRAWAL OF ARTERIAL BLOOD: CPT

## 2020-10-29 PROCEDURE — 86769 SARS-COV-2 COVID-19 ANTIBODY: CPT

## 2020-10-29 PROCEDURE — 94760 N-INVAS EAR/PLS OXIMETRY 1: CPT

## 2020-10-29 PROCEDURE — 99232 SBSQ HOSP IP/OBS MODERATE 35: CPT

## 2020-10-29 PROCEDURE — 86140 C-REACTIVE PROTEIN: CPT

## 2020-10-29 PROCEDURE — 84100 ASSAY OF PHOSPHORUS: CPT

## 2020-10-29 PROCEDURE — 94660 CPAP INITIATION&MGMT: CPT

## 2020-10-29 PROCEDURE — 99239 HOSP IP/OBS DSCHRG MGMT >30: CPT | Mod: GC

## 2020-10-29 PROCEDURE — 36415 COLL VENOUS BLD VENIPUNCTURE: CPT

## 2020-10-29 PROCEDURE — 93005 ELECTROCARDIOGRAM TRACING: CPT

## 2020-10-29 PROCEDURE — 97162 PT EVAL MOD COMPLEX 30 MIN: CPT

## 2020-10-29 PROCEDURE — 84145 PROCALCITONIN (PCT): CPT

## 2020-10-29 PROCEDURE — 82728 ASSAY OF FERRITIN: CPT

## 2020-10-29 PROCEDURE — 82803 BLOOD GASES ANY COMBINATION: CPT

## 2020-10-29 PROCEDURE — 85027 COMPLETE CBC AUTOMATED: CPT

## 2020-10-29 PROCEDURE — 82962 GLUCOSE BLOOD TEST: CPT

## 2020-10-29 PROCEDURE — 83605 ASSAY OF LACTIC ACID: CPT

## 2020-10-29 PROCEDURE — 83880 ASSAY OF NATRIURETIC PEPTIDE: CPT

## 2020-10-29 PROCEDURE — 85610 PROTHROMBIN TIME: CPT

## 2020-10-29 PROCEDURE — 87040 BLOOD CULTURE FOR BACTERIA: CPT

## 2020-10-29 PROCEDURE — 71045 X-RAY EXAM CHEST 1 VIEW: CPT

## 2020-10-29 PROCEDURE — 83735 ASSAY OF MAGNESIUM: CPT

## 2020-10-29 PROCEDURE — 85379 FIBRIN DEGRADATION QUANT: CPT

## 2020-10-29 PROCEDURE — 84484 ASSAY OF TROPONIN QUANT: CPT

## 2020-10-29 PROCEDURE — 94640 AIRWAY INHALATION TREATMENT: CPT

## 2020-10-29 PROCEDURE — 85025 COMPLETE CBC W/AUTO DIFF WBC: CPT

## 2020-10-29 RX ORDER — AZITHROMYCIN 500 MG/1
1 TABLET, FILM COATED ORAL
Qty: 3 | Refills: 0
Start: 2020-10-29 | End: 2020-10-31

## 2020-10-29 RX ORDER — FLUTICASONE PROPIONATE 50 MCG
1 SPRAY, SUSPENSION NASAL
Qty: 0 | Refills: 0 | DISCHARGE
Start: 2020-10-29

## 2020-10-29 RX ORDER — AZITHROMYCIN 500 MG/1
1 TABLET, FILM COATED ORAL
Qty: 13 | Refills: 0
Start: 2020-10-29 | End: 2020-11-27

## 2020-10-29 RX ORDER — INSULIN GLARGINE 100 [IU]/ML
10 INJECTION, SOLUTION SUBCUTANEOUS AT BEDTIME
Refills: 0 | Status: DISCONTINUED | OUTPATIENT
Start: 2020-10-29 | End: 2020-10-29

## 2020-10-29 RX ORDER — INSULIN LISPRO 100/ML
4 VIAL (ML) SUBCUTANEOUS
Refills: 0 | Status: DISCONTINUED | OUTPATIENT
Start: 2020-10-29 | End: 2020-10-29

## 2020-10-29 RX ADMIN — Medication 12.5 MILLIGRAM(S): at 06:21

## 2020-10-29 RX ADMIN — Medication 6: at 11:57

## 2020-10-29 RX ADMIN — Medication 81 MILLIGRAM(S): at 11:56

## 2020-10-29 RX ADMIN — Medication 40 MILLIGRAM(S): at 06:21

## 2020-10-29 RX ADMIN — BUDESONIDE AND FORMOTEROL FUMARATE DIHYDRATE 2 PUFF(S): 160; 4.5 AEROSOL RESPIRATORY (INHALATION) at 06:24

## 2020-10-29 RX ADMIN — LOSARTAN POTASSIUM 25 MILLIGRAM(S): 100 TABLET, FILM COATED ORAL at 06:22

## 2020-10-29 RX ADMIN — AZITHROMYCIN 500 MILLIGRAM(S): 500 TABLET, FILM COATED ORAL at 14:48

## 2020-10-29 RX ADMIN — Medication 5 UNIT(S): at 08:48

## 2020-10-29 RX ADMIN — ENOXAPARIN SODIUM 40 MILLIGRAM(S): 100 INJECTION SUBCUTANEOUS at 11:54

## 2020-10-29 RX ADMIN — Medication 4 UNIT(S): at 11:57

## 2020-10-29 RX ADMIN — TIOTROPIUM BROMIDE 1 CAPSULE(S): 18 CAPSULE ORAL; RESPIRATORY (INHALATION) at 06:23

## 2020-10-29 RX ADMIN — CLOPIDOGREL BISULFATE 75 MILLIGRAM(S): 75 TABLET, FILM COATED ORAL at 11:54

## 2020-10-29 NOTE — PHYSICAL THERAPY INITIAL EVALUATION ADULT - PLANNED THERAPY INTERVENTIONS, PT EVAL
balance training/gait training/stair training/strengthening bed mobility training/Assess stair training /c in 1 session/balance training/gait training/strengthening/transfer training

## 2020-10-29 NOTE — PROGRESS NOTE ADULT - ASSESSMENT
cont rx   cont rx      COMMODORE YUE [facility] P [phone]  1940 DOA 10/27/2020 DR AMANDA MCCARTNEY           REVIEW OF SYMPTOMS      Able to give ROS  Yes     RELIABLE No   CONSTITUTIONAL Weakness Yes  Chills No Vision changes No  ENDOCRINE No unexplained hair loss No heat or cold intolerance    ALLERGY No hives  Sore throat No   RESP Coughing blood no  Shortness of breath YES   NEURO No Headache  Confusion Pain neck No   CARDIAC No Chest pain No Palpitations   GI No Pain abdomen NO   Vomiting NO     PHYSICAL EXAM    HEENT Unremarkable PERRLA atraumatic   RESP Fair air entry EXP prolonged    Harsh breath sound Resp distres mild   CARDIAC S1 S2 No S3     NO JVD    ABDOMEN SOFT BS PRESENT NOT DISTENDED No hepatosplenomegaly PEDAL EDEMA present No calf tenderness  NO rash     PT DATA/BEST PRACTICE  ALLERGY   shellfish           WT                 10/27/2020 102 k              BMI                  10/27/2020 23                      ADVANCED DIRECTIVE     HEAD OF BED ELEVATION Yes      DVT PROPHYLAXIS.   lvnx 40 (10/27)      DIET. npo (10/27) --> dash (10/28)                                                                            MCCORMICK PROPHYLAXIS.   INFECTION PPLX                                                    OXYGENATION.   10/28-10/29/2020 3l 100% - ra 90%       IV F. VITALS.   10/29/2020 afeb 89 119/60       LABS.   Cr 10/27-10/28-10/29/2020 Cr 1.4 - 1.5-1.4  W 10/27/2020 w 11.9   Pr 10/27-10/29/2020 Pr negv - negv  CO2 10/27-10/29/2020 CO2 33-37   d-dimr 10/27/2020 d-dimr 151   Tr 10/27/2020 Tr negv       PATIENT SUMMARY.   79 m former smoker PMH HTN, DM2 (on home Metformin and glipizide), COPD (2L home/ BIPAP at night), CAD with 3v CABG 2004, Stent 2019 HLD, ECHO 2020 ECHO preserved lvsf dd1 ivc dilated but appears 50% collapse with respn    10/26/2018 ECHO ef 55% hx hypercapnic resp failure with ho intubation c/b with cardiac arrest (2018) with ho recurrent admissions GOLD GRADE D last admitted 2020 required intubated  now admitted 10/27/2020 with resp distress with aoc type 2 resp failure sec to copd ex   Pulm consulted 10/27/2020     COURSE   Improved with bd steroids and nppv within 48 h    PROBLEM/ASSESSMENT/RECOMMENDATIONS.    ACUTE ON CHRONIC TYPE 2 RESP FAILUER POA 10/27/2020  Improved   COPD ex   On BD steroids laba ;ama ics  PENUMONIA   Unlikely Had two negav proca Rocephin dced 10/28  DC planning      TIME SPENT   Over 25 minutes aggregate care time spent on encounter; activities included   direct patient care, counseling and/or coordinating care reviewing notes, lab data/ imaging , discussion with multidisciplinary team/ patient  /family and explaining in detail risks, benefits, alternatives  of the recommendations     COMMODORE TURNER [facility] P [phone]  1940 DOA 10/27/2020 DR AMANDA MCCARTNEY

## 2020-10-29 NOTE — PROGRESS NOTE ADULT - ASSESSMENT
81 y/o M w/ pmh of COPD (on home o2 3L and bipap qhs), eosinophilic asthma, cad (s/p cabg 2004), s/p 2 stents place at [place], [facility] s/p LLE stents (11/2019(, hld, dm2, bph, previous cardiac arrest 2/2 to hypercarbic resp failure 2/2 to COPD exacerbation, presented to [facility] on 10/27/2020 for SOB pt was found to be in hypercarbic resp failure.    -Neuro: Mental status stable and currently protecting his airway otherwise no acute issues at this time  -Cardiac: CAD s/p stents cont plavix/asa, HF/htn cont lopressor/losartan, maintain MAP >65  -Resp: Acute Hypercarbic resp failure 2/2 to underlying COPD/eosinophilic asthma put on bipap set 24/6 at 30% fio2 repeat ABG shows improvement in acidosis and hypercarbia. Dc duonebs. Decrease methylprednisolone to 40mg qd. Continue Azithro 500mg qd until 10/31, Symbicort and Albuterol prn  -GI: NPO while on bipap, GI ppx w/ protonix  -Renal: mild MERRILL monitor for now, cont to monitor trend renal functions and alphonse whitfield played monitor I&O  -ID: Cont Azithro 500mg qd until 10/31. f/u on cx  -Heme: DVT ppx w/ lovenox  -Endo: DM cont ISS. Endo consulted Dr Perlman as pt has kidney injury and home diabetic medications may need to be discontinued and pt may need to be insulin dependent  -Dispo: medically stable, no respiratory compromise. Dc pending 81 y/o M w/ pmh of COPD (on home o2 3L and bipap qhs), eosinophilic asthma, cad (s/p cabg 2004), s/p 2 stents place at [place], [facility] s/p LLE stents (11/2019(, hld, dm2, bph, previous cardiac arrest 2/2 to hypercarbic resp failure 2/2 to COPD exacerbation, presented to [facility] on 10/27/2020 for SOB pt was found to be in hypercarbic resp failure.    -Neuro: Mental status stable and currently protecting his airway otherwise no acute issues at this time  -Cardiac: CAD s/p stents cont plavix/asa, HF/htn cont lopressor/losartan, maintain MAP >65  -Resp: Acute Hypercarbic resp failure 2/2 to underlying COPD/eosinophilic asthma put on bipap set 24/6 at 30% fio2 repeat ABG shows improvement in acidosis and hypercarbia. Dc duonebs. Decrease methylprednisolone to 20mg qd. Continue Azithro 500mg qd until 10/31, Symbicort and Albuterol prn. then outpatient Azithro 250mg 3xweek  -GI: NPO while on bipap, GI ppx w/ protonix  -Renal: Likely has renal insufficiency, cont to monitor trend renal functions and lytes, monitor I&O  -ID: Bcx NGTD. Cont Azithro 500mg qd until 10/31 then 250mg 3xweek  -Heme: DVT ppx w/ lovenox  -Endo: DM cont ISS. Endo consulted Dr Perlman as pt has kidney injury and home diabetic medications may need to be discontinued and pt may need to be insulin dependent    Dispo: medically stable, no respiratory compromise. Dc planned for today 79 y/o M w/ pmh of COPD (on home o2 3L and bipap qhs), eosinophilic asthma, cad (s/p cabg 2004), s/p 2 stents place at [place], [facility] s/p LLE stents (11/2019(, hld, dm2, bph, previous cardiac arrest 2/2 to hypercarbic resp failure 2/2 to COPD exacerbation, presented to [facility] on 10/27/2020 for SOB pt was found to be in hypercarbic resp failure.    -Neuro: Mental status stable and currently protecting his airway otherwise no acute issues at this time  -Cardiac: CAD s/p stents cont plavix/asa, HF/htn cont lopressor/losartan, maintain MAP >65  -Resp: Acute Hypercarbic resp failure 2/2 to underlying COPD/eosinophilic asthma put on bipap set 24/6 at 30% fio2 repeat ABG shows improvement in acidosis and hypercarbia. Dc duonebs. Decrease methylprednisolone to 20mg qd. Continue Azithro 500mg qd until 10/31, Symbicort and Albuterol prn. then outpatient Azithro 250mg 3xweek  -GI: NPO while on bipap, GI ppx w/ protonix  -Renal: Likely has renal insufficiency, cont to monitor trend renal functions and lytes, monitor I&O  -ID: Bcx NGTD. Cont Azithro 500mg qd until 10/31 then 250mg 3xweek  -Heme: DVT ppx w/ lovenox  -Endo: DM cont ISS. Endo consulted Dr Perlman as pt has renal insufficiency and home diabetic medications may need to be discontinued/adjusted while on steroids. Pt will follow up with Dr Perlman outpt     Dispo: medically stable, no respiratory compromise. Dc planned for today

## 2020-10-29 NOTE — ADVANCED PRACTICE NURSE CONSULT - ASSESSMENT
Vital Signs Last 24 Hrs  T(C): 36.8 (29 Oct 2020 12:01), Max: 37.1 (29 Oct 2020 05:00)  T(F): 98.2 (29 Oct 2020 12:01), Max: 98.7 (29 Oct 2020 05:00)  HR: 92 (29 Oct 2020 15:00) (65 - 99)  BP: 107/55 (29 Oct 2020 15:00) (106/65 - 135/65)  BP(mean): 75 (29 Oct 2020 15:00) (75 - 102)  RR: 26 (29 Oct 2020 15:00) (12 - 31)  SpO2: 93% (29 Oct 2020 15:00) (90% - 100%)     eGFR if Non African American: 47 mL/min/1.73M2 (10-29-20 @ 05:46)  eGFR if : 54 mL/min/1.73M2 (10-29-20 @ 05:46)  eGFR if Non African American: 43 mL/min/1.73M2 (10-28-20 @ 05:34)  eGFR if African American: 50 mL/min/1.73M2 (10-28-20 @ 05:34)  eGFR if Non African American: 47 mL/min/1.73M2 (10-27-20 @ 06:34)  eGFR if African American: 54 mL/min/1.73M2 (10-27-20 @ 06:34)      CAPILLARY BLOOD GLUCOSE      POCT Blood Glucose.: 288 mg/dL (29 Oct 2020 11:42)  POCT Blood Glucose.: 130 mg/dL (29 Oct 2020 08:45)  POCT Blood Glucose.: 153 mg/dL (28 Oct 2020 22:24)  POCT Blood Glucose.: 176 mg/dL (28 Oct 2020 17:45)      DIET: CC >50 %

## 2020-10-29 NOTE — PHYSICAL THERAPY INITIAL EVALUATION ADULT - MANUAL MUSCLE TESTING RESULTS, REHAB EVAL
4+/5 throughout grossly grossly assessed due to/4+/5 throughout grossly except right quad is 4 to 4-/5

## 2020-10-29 NOTE — PROGRESS NOTE ADULT - SUBJECTIVE AND OBJECTIVE BOX
Patient seen and examined on day of discharge. Patient seen comfortably resting in chair without oxygen, saturating 98%. Patient states he feels well, eager to go home. Denies any CP, SOB, abd pain.    Physical Exam:  General: WN/WD NAD  Neurology: A&Ox3, nonfocal, ZIMMERMAN x 4  Respiratory: Diminished breath sounds throughout, but better aeration compared to yesterday, no wheezing noted  CV: RRR, S1S2  Abdominal: Soft, NT, ND +BS  Extremities: No edema, + peripheral pulses    Please refer to updated D/C note for further details.

## 2020-10-29 NOTE — PHYSICAL THERAPY INITIAL EVALUATION ADULT - CRITERIA FOR SKILLED THERAPEUTIC INTERVENTIONS
predicted duration of therapy intervention/anticipated equipment needs at discharge/rehab potential/anticipated discharge recommendation/therapy frequency/impairments found

## 2020-10-29 NOTE — PROGRESS NOTE ADULT - SUBJECTIVE AND OBJECTIVE BOX
[facility] - Saud Aguilar Grossman, Génesis, [place], Kumar Hodges  Office Number:  [phone]    Patient resting comfortably in bed in NAD.  Laying flat with no respiratory distress.  No complaints of chest pain, dyspnea, palpitations, PND, or orthopnea.  used bipap overnight    ROS: negative unless otherwise mentioned.    Telemetry:  sr    MEDICATIONS  (STANDING):  aspirin enteric coated 81 milliGRAM(s) Oral daily  atorvastatin 20 milliGRAM(s) Oral at bedtime  azithromycin   Tablet 500 milliGRAM(s) Oral every 24 hours  budesonide 160 MICROgram(s)/formoterol 4.5 MICROgram(s) Inhaler 2 Puff(s) Inhalation two times a day  clopidogrel Tablet 75 milliGRAM(s) Oral daily  dextrose 5%. 1000 milliLiter(s) (50 mL/Hr) IV Continuous <Continuous>  dextrose 50% Injectable 12.5 Gram(s) IV Push once  dextrose 50% Injectable 25 Gram(s) IV Push once  dextrose 50% Injectable 25 Gram(s) IV Push once  enoxaparin Injectable 40 milliGRAM(s) SubCutaneous daily  fluticasone propionate 50 MICROgram(s)/spray Nasal Spray 1 Spray(s) Both Nostrils two times a day  insulin glargine Injectable (LANTUS) 15 Unit(s) SubCutaneous at bedtime  insulin lispro (ADMELOG) corrective regimen sliding scale   SubCutaneous three times a day before meals  insulin lispro (ADMELOG) corrective regimen sliding scale   SubCutaneous at bedtime  insulin lispro Injectable (ADMELOG) 5 Unit(s) SubCutaneous three times a day before meals  losartan 25 milliGRAM(s) Oral daily  methylPREDNISolone sodium succinate Injectable 40 milliGRAM(s) IV Push daily  metoprolol tartrate 12.5 milliGRAM(s) Oral two times a day  montelukast 10 milliGRAM(s) Oral at bedtime  tamsulosin 0.4 milliGRAM(s) Oral at bedtime  tiotropium 18 MICROgram(s) Capsule 1 Capsule(s) Inhalation daily    MEDICATIONS  (PRN):  ALBUTerol    90 MICROgram(s) HFA Inhaler 1 Puff(s) Inhalation every 12 hours PRN Shortness of Breath and/or Wheezing  dextrose 40% Gel 15 Gram(s) Oral once PRN Blood Glucose LESS THAN 70 milliGRAM(s)/deciliter  glucagon  Injectable 1 milliGRAM(s) IntraMuscular once PRN Glucose LESS THAN 70 milligrams/deciliter      Allergies    No Known Allergies    Intolerances    shellfish (Nausea)      Vital Signs Last 24 Hrs  T(C): 37.1 (29 Oct 2020 05:00), Max: 37.1 (28 Oct 2020 12:00)  T(F): 98.7 (29 Oct 2020 05:00), Max: 98.8 (28 Oct 2020 12:00)  HR: 77 (29 Oct 2020 07:00) (60 - 103)  BP: 114/59 (29 Oct 2020 07:00) (92/46 - 148/68)  BP(mean): 80 (29 Oct 2020 07:00) (67 - 108)  RR: 17 (29 Oct 2020 07:00) (12 - 31)  SpO2: 100% (29 Oct 2020 07:00) (97% - 100%)    I&O's Summary    28 Oct 2020 07:01  -  29 Oct 2020 07:00  --------------------------------------------------------  IN: 1710 mL / OUT: 3450 mL / NET: -1740 mL        ON EXAM:    Constitutional: NAD, awake    HEENT: Moist Mucous Membranes, Anicteric  Pulmonary: Decreased breath sounds b/l. No rales, crackles or wheeze appreciated.   Cardiovascular: Regular, S1 and S2, No murmurs, rubs, gallops or clicks  Gastrointestinal: Bowel Sounds present, soft, nontender.   Lymph: No peripheral edema. No lymphadenopathy.  Skin: No visible rashes or ulcers.  Psych:  Mood & affect appropriate for situation    LABS: All Labs Reviewed:                        11.9   9.91  )-----------( 209      ( 29 Oct 2020 05:46 )             37.8                         11.5   7.54  )-----------( 172      ( 28 Oct 2020 05:34 )             35.7                         13.4   11.90 )-----------( 227      ( 27 Oct 2020 06:34 )             42.1     29 Oct 2020 05:46    144    |  105    |  26     ----------------------------<  79     3.9     |  37     |  1.40   28 Oct 2020 05:34    140    |  103    |  24     ----------------------------<  252    4.8     |  33     |  1.50   27 Oct 2020 06:34    139    |  103    |  17     ----------------------------<  216    4.8     |  33     |  1.40     Ca    9.5        29 Oct 2020 05:46  Ca    9.4        28 Oct 2020 05:34  Ca    9.4        27 Oct 2020 06:34  Phos  2.4       29 Oct 2020 05:46  Phos  2.8       28 Oct 2020 05:34  Mg     2.3       29 Oct 2020 05:46    TPro  6.1    /  Alb  3.1    /  TBili  0.3    /  DBili  x      /  AST  11     /  ALT  20     /  AlkPhos  73     29 Oct 2020 05:46  TPro  6.5    /  Alb  3.2    /  TBili  0.3    /  DBili  x      /  AST  12     /  ALT  21     /  AlkPhos  78     28 Oct 2020 05:34  TPro  7.5    /  Alb  3.7    /  TBili  0.5    /  DBili  x      /  AST  34     /  ALT  23     /  AlkPhos  93     27 Oct 2020 06:34    PT/INR - ( 28 Oct 2020 05:34 )   PT: 12.2 sec;   INR: 1.04 ratio               Blood Culture: Organism --  Gram Stain Blood -- Gram Stain --  Specimen Source .Blood Blood-Peripheral  Culture-Blood --      10-27 @ 06:34  Pro Bnp 176

## 2020-10-29 NOTE — PHYSICAL THERAPY INITIAL EVALUATION ADULT - IMPAIRMENTS FOUND, PT EVAL
gait, locomotion, and balance/aerobic capacity/endurance aerobic capacity/endurance/muscle strength/gait, locomotion, and balance

## 2020-10-29 NOTE — ADVANCED PRACTICE NURSE CONSULT - REASON FOR CONSULT
Patient is a 80y old  Male who presents with a chief complaint of shortness of breath (29 Oct 2020 15:43)    Type:2 a1c 6.2% (questionable tight control for age) home: MCCORMICK, metformin and occ Novolog for steroid therapy. Scale 2-4-6 starting at 200 mg/dL. SW wife at length. review diabetes management. No endocrine- see FU with Perlman post-DSC. Has all supplies. Wife has good understanding of diabetes management.  reminded wife while on steroids for COPD- no MCCORMICK to be given.       HPI:  79 yo M with PMH of COPD (On home O2 3L and BIPAP at while sleeping regardless of time of day), asthma, CAD (w/ 3v CABG 2004), s/p 2 recent coronary stents at [place], PVD s/p LLE stent (11/2019), HLD, DM2 (on Metformin and insulin while on steroid tapers), BPH, hx Hypercapnic Respiratory Failure/Cardiac Arrest requiring intubation (6/18), with multiple frequent admissions, last in 8/2020 Respiratory failure. Wife is at bedside, . pt c/o sudden onset of sob, was not improving on bipap, was becoming more lethargic, admitted fo hypercapnic respiratory faulure with c02 of 80, will continue on bipap   (27 Oct 2020 10:01)      PAST MEDICAL & SURGICAL HISTORY:  PVD (peripheral vascular disease)    Hypertension    COPD (chronic obstructive pulmonary disease)  on 2L at home and BiPAP at night; intubated 6/18    Osteomyelitis    Dyslipidemia    CAD (Coronary Artery Disease)  s/p 3v CABG 2004; stents placed in [place] in 2019    Diabetes Mellitus, Type II    S/P primary angioplasty with coronary stent    Compound fracture  left leg    CABG (Coronary Artery Bypass Graft)  2004        MEDICATIONS  (STANDING):  aspirin enteric coated 81 milliGRAM(s) Oral daily  atorvastatin 20 milliGRAM(s) Oral at bedtime  azithromycin   Tablet 500 milliGRAM(s) Oral every 24 hours  budesonide 160 MICROgram(s)/formoterol 4.5 MICROgram(s) Inhaler 2 Puff(s) Inhalation two times a day  clopidogrel Tablet 75 milliGRAM(s) Oral daily  dextrose 5%. 1000 milliLiter(s) (50 mL/Hr) IV Continuous <Continuous>  dextrose 50% Injectable 12.5 Gram(s) IV Push once  dextrose 50% Injectable 25 Gram(s) IV Push once  dextrose 50% Injectable 25 Gram(s) IV Push once  enoxaparin Injectable 40 milliGRAM(s) SubCutaneous daily  fluticasone propionate 50 MICROgram(s)/spray Nasal Spray 1 Spray(s) Both Nostrils two times a day  insulin glargine Injectable (LANTUS) 10 Unit(s) SubCutaneous at bedtime  insulin lispro (ADMELOG) corrective regimen sliding scale   SubCutaneous three times a day before meals  insulin lispro (ADMELOG) corrective regimen sliding scale   SubCutaneous at bedtime  insulin lispro Injectable (ADMELOG) 4 Unit(s) SubCutaneous three times a day before meals  losartan 25 milliGRAM(s) Oral daily  metoprolol tartrate 12.5 milliGRAM(s) Oral two times a day  montelukast 10 milliGRAM(s) Oral at bedtime  tamsulosin 0.4 milliGRAM(s) Oral at bedtime  tiotropium 18 MICROgram(s) Capsule 1 Capsule(s) Inhalation daily

## 2020-10-29 NOTE — PHYSICAL THERAPY INITIAL EVALUATION ADULT - GAIT TRAINING, PT EVAL
Ambulate 300ft without AD independently in 1 week. Ascend/descend 20steps with single HR in 1 week independently. Ambulate >500' without AD independently in 2 week

## 2020-10-29 NOTE — PHYSICAL THERAPY INITIAL EVALUATION ADULT - IMPAIRMENTS CONTRIBUTING TO GAIT DEVIATIONS, PT EVAL
very mild balance deficits impaired postural control/impaired balance/decreased strength/very mild balance deficits and strength to RLE

## 2020-10-29 NOTE — PHYSICAL THERAPY INITIAL EVALUATION ADULT - PERTINENT HX OF CURRENT PROBLEM, REHAB EVAL
As per H&P: As per H&P:81 yo M with PMH of COPD (On home O2 3L and BIPAP at while sleeping regardless of time of day), asthma, CAD (w/ 3v CABG 2004), s/p 2 recent coronary stents at [place], PVD s/p LLE stent (11/2019), HLD, DM2 (on Metformin and insulin while on steroid tapers), BPH, hx Hypercapnic Respiratory Failure/Cardiac Arrest requiring intubation (6/18), with multiple frequent admissions, last in 8/2020 Respiratory failure. "

## 2020-10-29 NOTE — PHYSICAL THERAPY INITIAL EVALUATION ADULT - GENERAL OBSERVATIONS, REHAB EVAL
Pt sitting in ICU recliner, off O2 and NAD or c/o. Pt /c ICU monitors in place. Pt agreeable /c PT eval and denies any c/o.

## 2020-10-29 NOTE — PHYSICAL THERAPY INITIAL EVALUATION ADULT - ADDITIONAL COMMENTS
Pt states he lives with wife in 2LH with WILLIAM with B/L rails and 2 staircases inside, one to 2nd floor and basement with HR, denies using AD although has SAC and RW, does not drive and states he was fully independent with ADL's and functional mobility, denies hx falls. Pt states he lives with wife in 2LH with WILLIAM with B/L rails and 2 staircases inside, one to 2nd floor and basement with HR (12 WILLIAM, 2 flight indoors, 1 to bedroom and 1 to basement). Pt denies using AD although has SAC, shower chair and RW, does not drive and states he was fully independent with ADL's and functional mobility, denies hx falls. Pt has home O2 and uses PRN.

## 2020-10-29 NOTE — PROGRESS NOTE ADULT - SUBJECTIVE AND OBJECTIVE BOX
CAPILLARY BLOOD GLUCOSE      POCT Blood Glucose.: 130 mg/dL (29 Oct 2020 08:45)  POCT Blood Glucose.: 153 mg/dL (28 Oct 2020 22:24)  POCT Blood Glucose.: 176 mg/dL (28 Oct 2020 17:45)  POCT Blood Glucose.: 234 mg/dL (28 Oct 2020 12:40)      Vital Signs Last 24 Hrs  T(C): 36.8 (29 Oct 2020 07:56), Max: 37.1 (28 Oct 2020 12:00)  T(F): 98.2 (29 Oct 2020 07:56), Max: 98.8 (28 Oct 2020 12:00)  HR: 89 (29 Oct 2020 10:49) (65 - 103)  BP: 119/60 (29 Oct 2020 10:00) (106/65 - 148/68)  BP(mean): 87 (29 Oct 2020 09:00) (80 - 102)  RR: 17 (29 Oct 2020 10:49) (12 - 31)  SpO2: 99% (29 Oct 2020 10:49) (90% - 100%)    General: WN/WD NAD  Respiratory: CTA B/L  CV: RRR, S1S2, no murmurs, rubs or gallops  Abdominal: Soft, NT, ND +BS, Last BM  Extremities: No edema, + peripheral pulses     10-29    144  |  105  |  26<H>  ----------------------------<  79  3.9   |  37<H>  |  1.40<H>    Ca    9.5      29 Oct 2020 05:46  Phos  2.4     10-29  Mg     2.3     10-29    TPro  6.1  /  Alb  3.1<L>  /  TBili  0.3  /  DBili  x   /  AST  11<L>  /  ALT  20  /  AlkPhos  73  10-29      atorvastatin 20 milliGRAM(s) Oral at bedtime  dextrose 40% Gel 15 Gram(s) Oral once PRN  dextrose 50% Injectable 12.5 Gram(s) IV Push once  dextrose 50% Injectable 25 Gram(s) IV Push once  dextrose 50% Injectable 25 Gram(s) IV Push once  glucagon  Injectable 1 milliGRAM(s) IntraMuscular once PRN  insulin glargine Injectable (LANTUS) 15 Unit(s) SubCutaneous at bedtime  insulin lispro (ADMELOG) corrective regimen sliding scale   SubCutaneous three times a day before meals  insulin lispro (ADMELOG) corrective regimen sliding scale   SubCutaneous at bedtime  insulin lispro Injectable (ADMELOG) 5 Unit(s) SubCutaneous three times a day before meals  methylPREDNISolone sodium succinate Injectable 40 milliGRAM(s) IV Push daily

## 2020-10-29 NOTE — PROGRESS NOTE ADULT - ATTENDING COMMENTS
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 Wheezing and hypercapnia have improved.    - Slow prednisone taper, 20 mg daily for 10 days, then 15 mg daily for 10 days, then 10 mg daily until outpatient follow-up  - Mepolizumab injections every month  - Complete course of azithromycin 500 mg daily for 5 days, then start 250 mg 3x/week   - Albuterol nebs q4h  - Symbicort + Spiriva  - Continue BiPAP 22/6 without supplemental oxygen  - Supplemental oxygen with NC when off BiPAP, goal SpO2>90%. No hypoxemia at rest, desaturates to 84% with walking  - Continue montelukast 10 mg at bedtime  - Outpatient pulmonary rehab  - PT eval  - Consider outpatient BLVR given recurrent exacerbations with residual volume on PFTs>200%  - H/o CAD + Diastolic dysfunction --> continue aspirin, clopidogrel, losartan, metoprolol, and statin  - Recent 2D-echo with normal LV systolic function, no significant valvular disease, no history of pulmonary hypertension  - DASH/TLC diet as tolerates  - Stable CKD, monitor BUN/Cr, strict I/O's  - Can discharge home with metformin, d/c sulfonylureas, follow-up with Dr. Perlman regarding starting Jardiance. Insulin coverage scale  - DVT ppx  - Discussed with patient and wife Sania at bedside. Stable for discharge home

## 2020-10-29 NOTE — PROGRESS NOTE ADULT - SUBJECTIVE AND OBJECTIVE BOX
YUE COTTO    [facility] ICU1 13    Patient is a 80y old  Male who presents with a chief complaint of shortness of breath (29 Oct 2020 07:47)       Allergies    No Known Allergies    Intolerances    shellfish (Nausea)      HPI:  79 yo M with PMH of COPD (On home O2 3L and BIPAP at while sleeping regardless of time of day), asthma, CAD (w/ 3v CABG ), s/p 2 recent coronary stents at [place], PVD s/p LLE stent (2019), HLD, DM2 (on Metformin and insulin while on steroid tapers), BPH, hx Hypercapnic Respiratory Failure/Cardiac Arrest requiring intubation (), with multiple frequent admissions, last in 2020 Respiratory failure. Wife is at bedside, . pt c/o sudden onset of sob, was not improving on bipap, was becoming more lethargic, admitted fo hypercapnic respiratory faulure with c02 of 80, will continue on bipap   (27 Oct 2020 10:01)      PAST MEDICAL & SURGICAL HISTORY:  PVD (peripheral vascular disease)    Hypertension    COPD (chronic obstructive pulmonary disease)  on 2L at home and BiPAP at night; intubated     Osteomyelitis    Dyslipidemia    CAD (Coronary Artery Disease)  s/p 3v CABG ; stents placed in [place] in     Diabetes Mellitus, Type II    S/P primary angioplasty with coronary stent    Compound fracture  left leg    CABG (Coronary Artery Bypass Graft)          FAMILY HISTORY:  Family hx of lung cancer  brother,  age 82, used to smoke with pt    Family history of diabetes mellitus (Sibling)          MEDICATIONS   ALBUTerol    90 MICROgram(s) HFA Inhaler 1 Puff(s) Inhalation every 12 hours PRN  aspirin enteric coated 81 milliGRAM(s) Oral daily  atorvastatin 20 milliGRAM(s) Oral at bedtime  azithromycin   Tablet 500 milliGRAM(s) Oral every 24 hours  budesonide 160 MICROgram(s)/formoterol 4.5 MICROgram(s) Inhaler 2 Puff(s) Inhalation two times a day  clopidogrel Tablet 75 milliGRAM(s) Oral daily  dextrose 40% Gel 15 Gram(s) Oral once PRN  dextrose 5%. 1000 milliLiter(s) IV Continuous <Continuous>  dextrose 50% Injectable 12.5 Gram(s) IV Push once  dextrose 50% Injectable 25 Gram(s) IV Push once  dextrose 50% Injectable 25 Gram(s) IV Push once  enoxaparin Injectable 40 milliGRAM(s) SubCutaneous daily  fluticasone propionate 50 MICROgram(s)/spray Nasal Spray 1 Spray(s) Both Nostrils two times a day  glucagon  Injectable 1 milliGRAM(s) IntraMuscular once PRN  insulin glargine Injectable (LANTUS) 15 Unit(s) SubCutaneous at bedtime  insulin lispro (ADMELOG) corrective regimen sliding scale   SubCutaneous three times a day before meals  insulin lispro (ADMELOG) corrective regimen sliding scale   SubCutaneous at bedtime  insulin lispro Injectable (ADMELOG) 5 Unit(s) SubCutaneous three times a day before meals  losartan 25 milliGRAM(s) Oral daily  methylPREDNISolone sodium succinate Injectable 40 milliGRAM(s) IV Push daily  metoprolol tartrate 12.5 milliGRAM(s) Oral two times a day  montelukast 10 milliGRAM(s) Oral at bedtime  tamsulosin 0.4 milliGRAM(s) Oral at bedtime  tiotropium 18 MICROgram(s) Capsule 1 Capsule(s) Inhalation daily      Vital Signs Last 24 Hrs  T(C): 36.8 (29 Oct 2020 07:56), Max: 37.1 (28 Oct 2020 12:00)  T(F): 98.2 (29 Oct 2020 07:56), Max: 98.8 (28 Oct 2020 12:00)  HR: 89 (29 Oct 2020 10:49) (65 - 103)  BP: 119/60 (29 Oct 2020 10:00) (106/65 - 148/68)  BP(mean): 87 (29 Oct 2020 09:00) (80 - 102)  RR: 17 (29 Oct 2020 10:49) (12 - 31)  SpO2: 99% (29 Oct 2020 10:49) (90% - 100%)      10-28-20 @ 07:01  -  10-29-20 @ 07:00  --------------------------------------------------------  IN: 1710 mL / OUT: 3450 mL / NET: -1740 mL            LABS:                        11.9   9.91  )-----------( 209      ( 29 Oct 2020 05:46 )             37.8     10-29    144  |  105  |  26<H>  ----------------------------<  79  3.9   |  37<H>  |  1.40<H>    Ca    9.5      29 Oct 2020 05:46  Phos  2.4     10-29  Mg     2.3     10-29    TPro  6.1  /  Alb  3.1<L>  /  TBili  0.3  /  DBili  x   /  AST  11<L>  /  ALT  20  /  AlkPhos  73  10-29    PT/INR - ( 28 Oct 2020 05:34 )   PT: 12.2 sec;   INR: 1.04 ratio               ABG - ( 28 Oct 2020 06:17 )  pH, Arterial: 7.37  pH, Blood: x     /  pCO2: 56    /  pO2: 125   / HCO3: 30    / Base Excess: 6.8   /  SaO2: 99                  WBC:  WBC Count: 9.91 K/uL (10-29 @ 05:46)  WBC Count: 7.54 K/uL (10-28 @ 05:34)  WBC Count: 11.90 K/uL (10-27 @ 06:34)      MICROBIOLOGY:  RECENT CULTURES:  10-27 .Blood Blood-Peripheral XXXX XXXX   No growth to date.                PT/INR - ( 28 Oct 2020 05:34 )   PT: 12.2 sec;   INR: 1.04 ratio             Sodium:  Sodium, Serum: 144 mmol/L (10-29 @ 05:46)  Sodium, Serum: 140 mmol/L (10-28 @ 05:34)  Sodium, Serum: 139 mmol/L (10-27 @ 06:34)      1.40 mg/dL 10-29 @ 05:46  1.50 mg/dL 10-28 @ 05:34  1.40 mg/dL 10-27 @ 06:34      Hemoglobin:  Hemoglobin: 11.9 g/dL (10-29 @ 05:46)  Hemoglobin: 11.5 g/dL (10-28 @ 05:34)  Hemoglobin: 13.4 g/dL (10-27 @ 06:34)      Platelets: Platelet Count - Automated: 209 K/uL (10-29 @ 05:46)  Platelet Count - Automated: 172 K/uL (10-28 @ 05:34)  Platelet Count - Automated: 227 K/uL (10-27 @ 06:34)      LIVER FUNCTIONS - ( 29 Oct 2020 05:46 )  Alb: 3.1 g/dL / Pro: 6.1 g/dL / ALK PHOS: 73 U/L / ALT: 20 U/L / AST: 11 U/L / GGT: x                 RADIOLOGY & ADDITIONAL STUDIES:

## 2020-10-29 NOTE — PROGRESS NOTE ADULT - SUBJECTIVE AND OBJECTIVE BOX
Patient is a 80y old  Male who presents with a chief complaint of shortness of breath (29 Oct 2020 07:08)    24 hour events: ***    REVIEW OF SYSTEMS  Constitutional: No fever, chills, fatigue  Neuro: No headache, numbness, weakness  Resp: No cough, wheezing, shortness of breath  CVS: No chest pain, palpitations, leg swelling  GI: No abdominal pain, nausea, vomiting, diarrhea   : No dysuria, frequency, incontinence  Skin: No itching, burning, rashes, or lesions   Msk: No joint pain or swelling  Psych: No depression, anxiety, mood swings  Heme: No bleeding    T(F): 98.7 (10-29-20 @ 05:00), Max: 98.8 (10-28-20 @ 12:00)  HR: 77 (10-29-20 @ 07:00) (60 - 103)  BP: 114/59 (10-29-20 @ 07:00) (92/46 - 148/68)  RR: 17 (10-29-20 @ 07:00) (12 - 31)  SpO2: 100% (10-29-20 @ 07:00) (97% - 100%)  Wt(kg): --    I&O's Summary    10-28 @ 07:01  -  10-29 @ 07:00  --------------------------------------------------------  IN: 1710 mL / OUT: 3450 mL / NET: -1740 mL      PHYSICAL EXAM  General:   CNS:   HEENT:   Resp:   CVS:   Abd:   Ext:   Skin:     MEDICATIONS  azithromycin   Tablet Oral    losartan Oral  metoprolol tartrate Oral  tamsulosin Oral    atorvastatin Oral  dextrose 40% Gel Oral PRN  dextrose 50% Injectable IV Push  dextrose 50% Injectable IV Push  dextrose 50% Injectable IV Push  glucagon  Injectable IntraMuscular PRN  insulin glargine Injectable (LANTUS) SubCutaneous  insulin lispro (ADMELOG) corrective regimen sliding scale SubCutaneous  insulin lispro (ADMELOG) corrective regimen sliding scale SubCutaneous  insulin lispro Injectable (ADMELOG) SubCutaneous  methylPREDNISolone sodium succinate Injectable IV Push    ALBUTerol    90 MICROgram(s) HFA Inhaler Inhalation PRN  budesonide 160 MICROgram(s)/formoterol 4.5 MICROgram(s) Inhaler Inhalation  montelukast Oral  tiotropium 18 MICROgram(s) Capsule Inhalation    aspirin enteric coated Oral  clopidogrel Tablet Oral  enoxaparin Injectable SubCutaneous    dextrose 5%. IV Continuous    fluticasone propionate 50 MICROgram(s)/spray Nasal Spray Both Nostrils                            11.9   9.91  )-----------( 209      ( 29 Oct 2020 05:46 )             37.8       10-29    144  |  105  |  26<H>  ----------------------------<  79  3.9   |  37<H>  |  1.40<H>    Ca    9.5      29 Oct 2020 05:46  Phos  2.4     10-29  Mg     2.3     10-29    TPro  6.1  /  Alb  3.1<L>  /  TBili  0.3  /  DBili  x   /  AST  11<L>  /  ALT  20  /  AlkPhos  73  10-29    PT/INR - ( 28 Oct 2020 05:34 )   PT: 12.2 sec;   INR: 1.04 ratio      .Blood Blood-Peripheral   No growth to date. -- 10-27 @ 09:08    Radiology: ***  Bedside lung ultrasound: ***  Bedside ECHO: ***    CENTRAL LINE: N  SHARMA: N  A-LINE: N    GLOBAL ISSUE/BEST PRACTICE:  Analgesia: N  Sedation: N  CAM-ICU: n/a  HOB elevation: Y  Stress ulcer prophylaxis: Y  VTE prophylaxis: Y  Glycemic control: Y  Nutrition: Y    CODE STATUS: FULL CODE  GO discussion: Y       Patient is a 80y old  Male who presents with a chief complaint of shortness of breath (29 Oct 2020 07:08)    24 hour events: No events overnight. Pt has no complaints. He says he is feeling much better today and is eager to go home.    REVIEW OF SYSTEMS  Constitutional: No fever, chills, fatigue  Resp: No cough, wheezing, shortness of breath  CVS: No chest pain, palpitations, leg swelling  GI: No abdominal pain, nausea, vomiting, diarrhea   : No dysuria, frequency, incontinence    T(F): 98.7 (10-29-20 @ 05:00), Max: 98.8 (10-28-20 @ 12:00)  HR: 77 (10-29-20 @ 07:00) (60 - 103)  BP: 114/59 (10-29-20 @ 07:00) (92/46 - 148/68)  RR: 17 (10-29-20 @ 07:00) (12 - 31)  SpO2: 100% (10-29-20 @ 07:00) (97% - 100%)  Wt(kg): --    I&O's Summary    10-28 @ 07:01  -  10-29 @ 07:00  --------------------------------------------------------  IN: 1710 mL / OUT: 3450 mL / NET: -1740 mL      PHYSICAL EXAM  Gen: Comfortable in bed in NAD  Neuro: AAox3, responds appropriately   HEENT: EOM intact, moist mucus membranes   Resp: Trace wheezing in RLL   CVS: RRR, +S1&S2, no murmurs, rubs, or gallops   Abd: BSx4, soft, nt/nd  Ext: no edema   Skin: warm/dry    MEDICATIONS  azithromycin   Tablet Oral    losartan Oral  metoprolol tartrate Oral  tamsulosin Oral    atorvastatin Oral  dextrose 40% Gel Oral PRN  dextrose 50% Injectable IV Push  dextrose 50% Injectable IV Push  dextrose 50% Injectable IV Push  glucagon  Injectable IntraMuscular PRN  insulin glargine Injectable (LANTUS) SubCutaneous  insulin lispro (ADMELOG) corrective regimen sliding scale SubCutaneous  insulin lispro (ADMELOG) corrective regimen sliding scale SubCutaneous  insulin lispro Injectable (ADMELOG) SubCutaneous  methylPREDNISolone sodium succinate Injectable IV Push    ALBUTerol    90 MICROgram(s) HFA Inhaler Inhalation PRN  budesonide 160 MICROgram(s)/formoterol 4.5 MICROgram(s) Inhaler Inhalation  montelukast Oral  tiotropium 18 MICROgram(s) Capsule Inhalation    aspirin enteric coated Oral  clopidogrel Tablet Oral  enoxaparin Injectable SubCutaneous    dextrose 5%. IV Continuous    fluticasone propionate 50 MICROgram(s)/spray Nasal Spray Both Nostrils                            11.9   9.91  )-----------( 209      ( 29 Oct 2020 05:46 )             37.8       10-29    144  |  105  |  26<H>  ----------------------------<  79  3.9   |  37<H>  |  1.40<H>    Ca    9.5      29 Oct 2020 05:46  Phos  2.4     10-29  Mg     2.3     10-29    TPro  6.1  /  Alb  3.1<L>  /  TBili  0.3  /  DBili  x   /  AST  11<L>  /  ALT  20  /  AlkPhos  73  10-29    PT/INR - ( 28 Oct 2020 05:34 )   PT: 12.2 sec;   INR: 1.04 ratio      .Blood Blood-Peripheral   No growth to date. -- 10-27 @ 09:08    Radiology: new new imaging   Bedside lung ultrasound: N/A  Bedside ECHO: N/A    CENTRAL LINE: N  SAHRMA: N  A-LINE: N    GLOBAL ISSUE/BEST PRACTICE:  Analgesia: N  Sedation: N  CAM-ICU: n/a  HOB elevation: Y  Stress ulcer prophylaxis: Y  VTE prophylaxis: Y  Glycemic control: Y  Nutrition: Y    CODE STATUS: FULL CODE  GOC discussion: Y

## 2020-10-29 NOTE — PROGRESS NOTE ADULT - ASSESSMENT
79 yo M with PMH of COPD (On home O2 3L and BIPAP at while sleeping regardless of time of day), asthma, CAD (w/ 3v CABG ), s/p 2 recent coronary stents at [place], PVD s/p LLE stent (2019), HLD, DM2 (on Metformin and insulin while on steroid tapers), BPH, hx Hypercapnic Respiratory Failure/Cardiac Arrest requiring intubation (), with multiple frequent admissions, last in 2020 Respiratory failure.  Presents with progressive worsening dyspnea. Found to have Acute hypercapnic respiratory failure 2/2 COPD exacerbation. Cardio consulted for dyspnea r/o CHF    - Pt has hx of multiple admissions for acute hypercapnic respiratory, readmitted again for acute hypercapnic respiratory likely 2/2 COPD exacerbation  - AB. at presentation  - now off of bipap. will need PRN  - No clear evidence of acute ischemia   - No hx of CHF. BMP unimpressive. No meaningful evidence of volume overload on exam. Lungs clear on CXR. No need for diuresis at this time.  - TTE 2020: preserved left ventricular systolic function. Doppler evidence of grade 1 diastolic dysfunction. The IVC appears dilated at 2.6 cm, though collapses >50% with respiration. No significant pericardial effusion    - bp controlled. cont current meds.   - monitor and replete lytes, keep K>4, Mg>2  - Continue home cardiac medications.  - Other cardiovascular workup will depend on clinical course.

## 2020-10-29 NOTE — PROGRESS NOTE ADULT - PROBLEM SELECTOR PLAN 1
oral anti-diabetes agents on hold  cont lantus 15 units qhs  cont humalog 5 units 3x/day before meals  cont mod dose humalog scale coverage qac/qhs  anticipate improvement in bg numbers with tapering of glucocorticoids  adjustment to outpt regimen to be made in light of renal insuff.
with acute hypoxemic respiratory failure- now improving  continue bipap  continue on IV steroids  duoneb  albuterol   continue zithromax  Care as per MICU

## 2020-10-29 NOTE — ADVANCED PRACTICE NURSE CONSULT - RECOMMEDATIONS
Type 2  A1c 6.2 % s/p COPD exacerbation   Recommend endocrine: perlman FU appt: call office for appt  Diabetes support group info given  Goal 100-180 mg/dL

## 2020-10-29 NOTE — DISCHARGE NOTE NURSING/CASE MANAGEMENT/SOCIAL WORK - PATIENT PORTAL LINK FT
You can access the FollowMyHealth Patient Portal offered by [facility] by registering at the following website: [email or web address]. By joining [employer]’s FollowMyHealth portal, you will also be able to view your health information using other applications (apps) compatible with our system.

## 2020-10-29 NOTE — DISCHARGE NOTE NURSING/CASE MANAGEMENT/SOCIAL WORK - NSDCVIVACCINE_GEN_ALL_CORE_FT
Influenza , 2019/10/18 13:33 , Faiza Mclaughlin (RN)  Influenza , 2020/9/26 15:14 , Cory Crooks (RN)

## 2020-10-29 NOTE — PROGRESS NOTE ADULT - REASON FOR ADMISSION
shortness of breath

## 2020-11-03 NOTE — H&P ADULT - PROBLEM SELECTOR PLAN 7
Consulted for new ileostomy created 11/1/2020. Evaluation of the ostomy reveals a skin tear to the anterior side of stoma between the incision line and the stoma.  There was a second skin tear noted to the outer most area of the flange and below the flange  To the lower abdomen. These areas were not all under the previous flange site these areas extended beyond the flange. Appears to be tape injury at this evaluation. Staples and incision line intact with scant drainage. Two RIO drains noted one on each side of the incision line. Cleaned areas and applied a drain gauze and secured with a small piece of tape due to sensitive skin. Stoma is healthy red lyndsey bud and the immediate skin surrounding the stoma was intact. Cleaned skin with bath wipes and allowed to dry. Applied coloplast hydrocolloid over the skin tear area to protect this area and then applied new ileostomy appliance. Sealed the ostomy flange in place. Patient did complain of pain 9/10 even though he was medicated with analgesics prior to ostomy care. Nurse Robles assisted with ostomy and wound care. Spoke with the patients significant other and initiated ileostomy teaching. Also obtained signed authorization to submit enrollment forms to [facility] and [facility] for free starter kits to be mailed to the patient. Will follow up with this patient throughout his stay due to new ileostomy. Will also continue to teach the spouse regarding care of the ileostomy at home.    continue current management and present  medications ,cardiology consult

## 2020-11-08 DIAGNOSIS — Z01.818 ENCOUNTER FOR OTHER PREPROCEDURAL EXAMINATION: ICD-10-CM

## 2020-11-08 NOTE — ED ADULT NURSE NOTE - NS ED NURSE LEVEL OF CONSCIOUSNESS SPEECH
[Speaking Coherently

## 2020-11-09 ENCOUNTER — APPOINTMENT (OUTPATIENT)
Dept: DISASTER EMERGENCY | Facility: CLINIC | Age: 80
End: 2020-11-09

## 2020-11-10 LAB — SARS-COV-2 N GENE NPH QL NAA+PROBE: NOT DETECTED

## 2020-11-13 ENCOUNTER — APPOINTMENT (OUTPATIENT)
Dept: PULMONOLOGY | Facility: CLINIC | Age: 80
End: 2020-11-13

## 2020-11-16 ENCOUNTER — NON-APPOINTMENT (OUTPATIENT)
Age: 80
End: 2020-11-16

## 2020-12-03 ENCOUNTER — NON-APPOINTMENT (OUTPATIENT)
Age: 80
End: 2020-12-03

## 2020-12-07 ENCOUNTER — APPOINTMENT (OUTPATIENT)
Dept: VASCULAR SURGERY | Facility: CLINIC | Age: 80
End: 2020-12-07
Payer: MEDICARE

## 2020-12-07 VITALS
HEART RATE: 100 BPM | BODY MASS INDEX: 32.35 KG/M2 | HEIGHT: 70 IN | DIASTOLIC BLOOD PRESSURE: 73 MMHG | SYSTOLIC BLOOD PRESSURE: 121 MMHG | WEIGHT: 226 LBS

## 2020-12-07 VITALS — TEMPERATURE: 97.6 F

## 2020-12-07 PROCEDURE — 99213 OFFICE O/P EST LOW 20 MIN: CPT

## 2020-12-07 PROCEDURE — 93978 VASCULAR STUDY: CPT

## 2020-12-07 PROCEDURE — 99072 ADDL SUPL MATRL&STAF TM PHE: CPT

## 2020-12-07 PROCEDURE — 93925 LOWER EXTREMITY STUDY: CPT

## 2020-12-07 RX ORDER — GLIPIZIDE 5 MG/1
5 TABLET, FILM COATED, EXTENDED RELEASE ORAL
Refills: 0 | Status: DISCONTINUED | COMMUNITY
End: 2020-12-07

## 2020-12-07 NOTE — ASSESSMENT
[FreeTextEntry1] : Patient with peripheral vascular disease, \par Right popliteal artery beyond the stent at the short segment occlusion.  Good flow through the stent.   Continue conservative management.  Follow-up in 3 months.

## 2020-12-14 ENCOUNTER — APPOINTMENT (OUTPATIENT)
Dept: VASCULAR SURGERY | Facility: CLINIC | Age: 80
End: 2020-12-14

## 2020-12-15 NOTE — ED ADULT NURSE NOTE - CHPI ED NUR DURATION
Subjective     Katherine Royal is a 54 year old female who presents to clinic today for the following health issues:    HPI        Hypertension Follow-up      Do you check your blood pressure regularly outside of the clinic? Yes     Are you following a low salt diet? No    Are your blood pressures ever more than 140 on the top number (systolic) OR more   than 90 on the bottom number (diastolic), for example 140/90? No   They have been pretty low she states . Since she started lisinopril         GERD/Heartburn  Onset/Duration: on going   Description: burming , pain.   Intensity: moderate  Progression of Symptoms: improving and constant  Accompanying Signs & Symptoms:  Does it feel like food gets stuck or trouble swallowing: YES  Nausea: YES- sometimes   Vomiting (bloody?): no  Abdominal Pain: YES upper epigastric area   Black-Tarry stools: no  Bloody stools: no  History:  Previous similar episodes: YES  Previous ulcers: no  Precipitating factors:   Caffeine use: YES- tea and soda . Bid for tea. And a bottle of soda daily .   Alcohol use: no  NSAID/Aspirin use: no  Tobacco use: no  Worse with fatty foods and spicy foods.  Alleviating factors: None  Therapies tried and outcome:             Lifestyle changes: omeprozole has improved it a little bit             Medications: Omeprazole (Prilosec)    Diabetes Follow-up    How often are you checking your blood sugar? One time daily  What time of day are you checking your blood sugars (select all that apply)?  Before meals  Have you had any blood sugars above 200?  No  Have you had any blood sugars below 70?  No    What symptoms do you notice when your blood sugar is low?  Weak    What concerns do you have today about your diabetes? None and Low blood sugar     Do you have any of these symptoms? (Select all that apply)  Numbness in feet, Burning in feet and Weight loss bottom of right foot hurts 7/10 .     Have you had a diabetic eye exam in the last 12 months? No  Diabetic  foot exam   1. foot deformity   No  2. Current or previous foot ulceration     No  3. Current or previous pre-ulcerative calluses     No  4. previous partial amputation of one or both feet or complete amputation of one foot     No  5. peripheral neuropathy with evidence of callus formation     No  6. poor circulation     No    Hypoglycemia    Foot pain    Facial pain    Cough secondary to gerd    BP Readings from Last 2 Encounters:   12/18/20 100/62   12/18/20 96/68     Hemoglobin A1C (%)   Date Value   12/18/2020 6.0 (H)   05/22/2020 6.1 (H)     LDL Cholesterol Calculated (mg/dL)   Date Value   12/18/2020 75   05/22/2020 61         Review of Systems   Constitutional, HEENT, cardiovascular, pulmonary, gi and gu systems are negative, except as otherwise noted.      Objective    BP 96/68 (BP Location: Right arm, Patient Position: Sitting, Cuff Size: Adult Regular)   Pulse 102   Temp 98.2  F (36.8  C)   Wt 54.4 kg (120 lb)   SpO2 99%   BMI 20.60 kg/m    Body mass index is 20.6 kg/m .  Physical Exam  Vitals signs and nursing note reviewed.   Constitutional:       General: She is not in acute distress.     Appearance: She is well-developed.   HENT:      Head: Normocephalic and atraumatic.      Right Ear: External ear normal.      Left Ear: External ear normal.   Eyes:      Conjunctiva/sclera: Conjunctivae normal.      Pupils: Pupils are equal, round, and reactive to light.   Neck:      Musculoskeletal: Neck supple.   Cardiovascular:      Rate and Rhythm: Normal rate and regular rhythm.   Pulmonary:      Effort: Pulmonary effort is normal.      Breath sounds: Normal breath sounds.   Lymphadenopathy:      Cervical: No cervical adenopathy.   Skin:     General: Skin is warm and dry.   Neurological:      Mental Status: She is alert and oriented to person, place, and time.          Orders Only on 05/22/2020   Component Date Value Ref Range Status     WBC 05/22/2020 4.8  4.0 - 11.0 10e9/L Final     RBC Count 05/22/2020  4.56  3.8 - 5.2 10e12/L Final     Hemoglobin 05/22/2020 13.8  11.7 - 15.7 g/dL Final     Hematocrit 05/22/2020 40.2  35.0 - 47.0 % Final     MCV 05/22/2020 88  78 - 100 fl Final     MCH 05/22/2020 30.3  26.5 - 33.0 pg Final     MCHC 05/22/2020 34.3  31.5 - 36.5 g/dL Final     RDW 05/22/2020 12.3  10.0 - 15.0 % Final     Platelet Count 05/22/2020 268  150 - 450 10e9/L Final     Diff Method 05/22/2020 Automated Method   Final     % Neutrophils 05/22/2020 52.2  % Final     % Lymphocytes 05/22/2020 37.9  % Final     % Monocytes 05/22/2020 6.5  % Final     % Eosinophils 05/22/2020 1.9  % Final     % Basophils 05/22/2020 1.3  % Final     % Immature Granulocytes 05/22/2020 0.2  % Final     Nucleated RBCs 05/22/2020 0  0 /100 Final     Absolute Neutrophil 05/22/2020 2.5  1.6 - 8.3 10e9/L Final     Absolute Lymphocytes 05/22/2020 1.8  0.8 - 5.3 10e9/L Final     Absolute Monocytes 05/22/2020 0.3  0.0 - 1.3 10e9/L Final     Absolute Eosinophils 05/22/2020 0.1  0.0 - 0.7 10e9/L Final     Absolute Basophils 05/22/2020 0.1  0.0 - 0.2 10e9/L Final     Abs Immature Granulocytes 05/22/2020 0.0  0 - 0.4 10e9/L Final     Absolute Nucleated RBC 05/22/2020 0.0   Final     Sodium 05/22/2020 141  133 - 144 mmol/L Final     Potassium 05/22/2020 4.2  3.4 - 5.3 mmol/L Final     Chloride 05/22/2020 108  94 - 109 mmol/L Final     Carbon Dioxide 05/22/2020 25  20 - 32 mmol/L Final     Anion Gap 05/22/2020 8  3 - 14 mmol/L Final     Glucose 05/22/2020 107* 70 - 99 mg/dL Final     Urea Nitrogen 05/22/2020 15  7 - 30 mg/dL Final     Creatinine 05/22/2020 0.49* 0.52 - 1.04 mg/dL Final     GFR Estimate 05/22/2020 >90  >60 mL/min/[1.73_m2] Final    Comment: Non  GFR Calc  Starting 12/18/2018, serum creatinine based estimated GFR (eGFR) will be   calculated using the Chronic Kidney Disease Epidemiology Collaboration   (CKD-EPI) equation.       GFR Estimate If Black 05/22/2020 >90  >60 mL/min/[1.73_m2] Final    Comment:   GFR Calc  Starting 12/18/2018, serum creatinine based estimated GFR (eGFR) will be   calculated using the Chronic Kidney Disease Epidemiology Collaboration   (CKD-EPI) equation.       Calcium 05/22/2020 9.9  8.5 - 10.1 mg/dL Final     Bilirubin Total 05/22/2020 0.5  0.2 - 1.3 mg/dL Final     Albumin 05/22/2020 4.3  3.4 - 5.0 g/dL Final     Protein Total 05/22/2020 7.7  6.8 - 8.8 g/dL Final     Alkaline Phosphatase 05/22/2020 72  40 - 150 U/L Final     ALT 05/22/2020 37  0 - 50 U/L Final     AST 05/22/2020 16  0 - 45 U/L Final     Cholesterol 05/22/2020 155  <200 mg/dL Final     Triglycerides 05/22/2020 62  <150 mg/dL Final     HDL Cholesterol 05/22/2020 82  >49 mg/dL Final     LDL Cholesterol Calculated 05/22/2020 61  <100 mg/dL Final    Desirable:       <100 mg/dl     Non HDL Cholesterol 05/22/2020 73  <130 mg/dL Final     TSH 05/22/2020 0.68  0.40 - 4.00 mU/L Final     Hemoglobin A1C 05/22/2020 6.1* 0 - 5.6 % Final    Comment: Normal <5.7% Prediabetes 5.7-6.4%  Diabetes 6.5% or higher - adopted from ADA   consensus guidelines.       Estimated Average Glucose 05/22/2020 128  mg/dL Final           Assessment & Plan     Type 2 diabetes mellitus without complication, unspecified whether long term insulin use (H)  Fasting labs are reviewed.  Goal of hemoglobin A1C of less than 7 discussed.  Instructed in the importance of diet, exercise, and good glycemic control in prevention of secondary complications.    Continue same medication regimen. Repeat fasting glucose and hemoglobin A1C in 3 months.    - Hemoglobin A1c  - Albumin Random Urine Quantitative with Creat Ratio  - MD FOOT EXAM NO CHARGE  - CBC with platelets differential  - Comprehensive metabolic panel  - Lipid Profile  - Estimated Average Glucose    HTN (hypertension)  Goals reviewed.  Continue home BP monitoring and same medication regimen.  Follow up 6 months.     Gastroesophageal reflux disease without esophagitis  Continue proton pump inhibitor   -  omeprazole (PRILOSEC) 40 MG DR capsule; TAKE 1 CAPSULE BY MOUTH ONCE DAILY 30-60  MINUTES  BEFORE  A  MEAL    Neuralgia  Gabapentin as written  - Uric acid  - gabapentin (NEURONTIN) 300 MG capsule; Take 1 capsule (300 mg) by mouth 3 times daily        See Patient Instructions    No follow-ups on file.    Delbert Rosas MD  [facility]     today

## 2020-12-18 ENCOUNTER — APPOINTMENT (OUTPATIENT)
Dept: DISASTER EMERGENCY | Facility: CLINIC | Age: 80
End: 2020-12-18

## 2020-12-18 ENCOUNTER — LABORATORY RESULT (OUTPATIENT)
Age: 80
End: 2020-12-18

## 2020-12-21 ENCOUNTER — APPOINTMENT (OUTPATIENT)
Dept: PULMONOLOGY | Facility: CLINIC | Age: 80
End: 2020-12-21
Payer: MEDICARE

## 2020-12-21 PROCEDURE — 99072 ADDL SUPL MATRL&STAF TM PHE: CPT

## 2020-12-21 PROCEDURE — 94618 PULMONARY STRESS TESTING: CPT

## 2020-12-21 PROCEDURE — 94010 BREATHING CAPACITY TEST: CPT

## 2020-12-21 PROCEDURE — 94726 PLETHYSMOGRAPHY LUNG VOLUMES: CPT

## 2020-12-21 PROCEDURE — 94729 DIFFUSING CAPACITY: CPT

## 2020-12-28 ENCOUNTER — NON-APPOINTMENT (OUTPATIENT)
Age: 80
End: 2020-12-28

## 2020-12-29 ENCOUNTER — APPOINTMENT (OUTPATIENT)
Dept: PULMONOLOGY | Facility: CLINIC | Age: 80
End: 2020-12-29
Payer: MEDICARE

## 2020-12-29 VITALS — TEMPERATURE: 97.8 F

## 2020-12-29 PROCEDURE — 99072 ADDL SUPL MATRL&STAF TM PHE: CPT

## 2020-12-29 PROCEDURE — G0424: CPT

## 2020-12-31 ENCOUNTER — APPOINTMENT (OUTPATIENT)
Dept: PULMONOLOGY | Facility: CLINIC | Age: 80
End: 2020-12-31

## 2021-01-05 ENCOUNTER — APPOINTMENT (OUTPATIENT)
Dept: PULMONOLOGY | Facility: CLINIC | Age: 81
End: 2021-01-05
Payer: MEDICARE

## 2021-01-05 PROCEDURE — 99072 ADDL SUPL MATRL&STAF TM PHE: CPT

## 2021-01-05 PROCEDURE — G0424: CPT

## 2021-01-07 ENCOUNTER — APPOINTMENT (OUTPATIENT)
Dept: PULMONOLOGY | Facility: CLINIC | Age: 81
End: 2021-01-07
Payer: MEDICARE

## 2021-01-07 VITALS — TEMPERATURE: 96.9 F

## 2021-01-07 PROCEDURE — G0424: CPT

## 2021-01-07 PROCEDURE — 99072 ADDL SUPL MATRL&STAF TM PHE: CPT

## 2021-01-12 ENCOUNTER — APPOINTMENT (OUTPATIENT)
Dept: PULMONOLOGY | Facility: CLINIC | Age: 81
End: 2021-01-12

## 2021-01-14 ENCOUNTER — APPOINTMENT (OUTPATIENT)
Dept: PULMONOLOGY | Facility: CLINIC | Age: 81
End: 2021-01-14

## 2021-01-19 ENCOUNTER — NON-APPOINTMENT (OUTPATIENT)
Age: 81
End: 2021-01-19

## 2021-03-08 ENCOUNTER — APPOINTMENT (OUTPATIENT)
Dept: VASCULAR SURGERY | Facility: CLINIC | Age: 81
End: 2021-03-08
Payer: MEDICARE

## 2021-03-08 PROCEDURE — 93978 VASCULAR STUDY: CPT

## 2021-03-08 PROCEDURE — 99213 OFFICE O/P EST LOW 20 MIN: CPT

## 2021-03-08 PROCEDURE — 99072 ADDL SUPL MATRL&STAF TM PHE: CPT

## 2021-03-09 ENCOUNTER — APPOINTMENT (OUTPATIENT)
Dept: VASCULAR SURGERY | Facility: CLINIC | Age: 81
End: 2021-03-09
Payer: MEDICARE

## 2021-03-09 PROCEDURE — 99072 ADDL SUPL MATRL&STAF TM PHE: CPT

## 2021-03-09 PROCEDURE — 93923 UPR/LXTR ART STDY 3+ LVLS: CPT

## 2021-03-09 PROCEDURE — 99213 OFFICE O/P EST LOW 20 MIN: CPT

## 2021-03-09 NOTE — HISTORY OF PRESENT ILLNESS
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  pt reports bilateral lower extremity claudications of about 15 feet.  pt also reports pain in the toes mainly in the evening in bed   \par pt denies any rest pain of the left lower extremity

## 2021-03-09 NOTE — PHYSICAL EXAM
[0] : left 0 [No Rash or Lesion] : No rash or lesion [Alert] : alert [Calm] : calm [JVD] : no jugular venous distention  [Ankle Swelling (On Exam)] : not present [Varicose Veins Of Lower Extremities] : not present [] : not present [Skin Ulcer] : no ulcer [de-identified] : appears well

## 2021-03-09 NOTE — ASSESSMENT
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

## 2021-03-12 NOTE — ASSESSMENT
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  \par \par duplex shows patent right sfa stent with occluded pop and pta artery, left lower extremity patent sfa and pop stents \par aortoiliac duplex shows patent left maliha and eia stent \par \par pt with rest pain and claudications will schedule pt for jim/pvr with toe pressure tomorrow

## 2021-03-12 NOTE — HISTORY OF PRESENT ILLNESS
[FreeTextEntry1] : 79 yo male with history of dm, htn, copd, pad s/p b/l iliac stents and right lower extremity sfa stent with chronic occlussion of the right pop and pta arteries.  pt reports bilateral lower extremity claudications of about 15 feet.  pt also reports pain in the toes mainly in the evening in bed   \par pt denies any rest pain of the left lower extremity

## 2021-03-12 NOTE — PHYSICAL EXAM
[JVD] : no jugular venous distention  [Normal Breath Sounds] : Normal breath sounds [Normal Heart Sounds] : normal heart sounds [2+] : left 2+ [0] : left 0 [Ankle Swelling (On Exam)] : not present [Varicose Veins Of Lower Extremities] : not present [] : not present [Abdomen Masses] : No abdominal masses [Skin Ulcer] : no ulcer [Oriented to Place] : oriented to place

## 2021-03-22 ENCOUNTER — NON-APPOINTMENT (OUTPATIENT)
Age: 81
End: 2021-03-22

## 2021-03-24 RX ORDER — BENRALIZUMAB 30 MG/ML
30 INJECTION, SOLUTION SUBCUTANEOUS
Qty: 1 | Refills: 8 | Status: DISCONTINUED | COMMUNITY
Start: 2021-03-22 | End: 2021-03-24

## 2021-03-24 RX ORDER — OMALIZUMAB 75 MG/.5ML
75 INJECTION, SOLUTION SUBCUTANEOUS
Qty: 1 | Refills: 10 | Status: DISCONTINUED | COMMUNITY
Start: 2021-03-23 | End: 2021-03-24

## 2021-03-24 RX ORDER — OMALIZUMAB 150 MG/ML
150 INJECTION, SOLUTION SUBCUTANEOUS
Qty: 1.5 | Refills: 10 | Status: DISCONTINUED | COMMUNITY
Start: 2021-03-23 | End: 2021-03-24

## 2021-04-05 ENCOUNTER — NON-APPOINTMENT (OUTPATIENT)
Age: 81
End: 2021-04-05

## 2021-04-27 ENCOUNTER — NON-APPOINTMENT (OUTPATIENT)
Age: 81
End: 2021-04-27

## 2021-05-17 ENCOUNTER — INPATIENT (INPATIENT)
Facility: HOSPITAL | Age: 81
LOS: 1 days | Discharge: HOME | DRG: 189 | End: 2021-05-19
Attending: INTERNAL MEDICINE | Admitting: STUDENT IN AN ORGANIZED HEALTH CARE EDUCATION/TRAINING PROGRAM
Payer: COMMERCIAL

## 2021-05-17 VITALS
HEIGHT: 78 IN | OXYGEN SATURATION: 100 % | WEIGHT: 216.93 LBS | HEART RATE: 110 BPM | DIASTOLIC BLOOD PRESSURE: 105 MMHG | SYSTOLIC BLOOD PRESSURE: 168 MMHG | RESPIRATION RATE: 24 BRPM

## 2021-05-17 DIAGNOSIS — Z95.5 PRESENCE OF CORONARY ANGIOPLASTY IMPLANT AND GRAFT: Chronic | ICD-10-CM

## 2021-05-17 DIAGNOSIS — T14.8XXA OTHER INJURY OF UNSPECIFIED BODY REGION, INITIAL ENCOUNTER: Chronic | ICD-10-CM

## 2021-05-17 DIAGNOSIS — R06.03 ACUTE RESPIRATORY DISTRESS: ICD-10-CM

## 2021-05-17 LAB
ALBUMIN SERPL ELPH-MCNC: 4.1 G/DL — SIGNIFICANT CHANGE UP (ref 3.3–5)
ALP SERPL-CCNC: 101 U/L — SIGNIFICANT CHANGE UP (ref 40–120)
ALT FLD-CCNC: 19 U/L — SIGNIFICANT CHANGE UP (ref 12–78)
ANION GAP SERPL CALC-SCNC: 7 MMOL/L — SIGNIFICANT CHANGE UP (ref 5–17)
AST SERPL-CCNC: 18 U/L — SIGNIFICANT CHANGE UP (ref 15–37)
BASE EXCESS BLDA CALC-SCNC: 1 MMOL/L — SIGNIFICANT CHANGE UP (ref -2–2)
BASE EXCESS BLDA CALC-SCNC: 1.6 MMOL/L — SIGNIFICANT CHANGE UP (ref -2–2)
BASOPHILS # BLD AUTO: 0 K/UL — SIGNIFICANT CHANGE UP (ref 0–0.2)
BASOPHILS NFR BLD AUTO: 0 % — SIGNIFICANT CHANGE UP (ref 0–2)
BILIRUB SERPL-MCNC: 0.3 MG/DL — SIGNIFICANT CHANGE UP (ref 0.2–1.2)
BLOOD GAS COMMENTS ARTERIAL: SIGNIFICANT CHANGE UP
BLOOD GAS COMMENTS ARTERIAL: SIGNIFICANT CHANGE UP
BUN SERPL-MCNC: 24 MG/DL — HIGH (ref 7–23)
CALCIUM SERPL-MCNC: 9.9 MG/DL — SIGNIFICANT CHANGE UP (ref 8.5–10.1)
CHLORIDE SERPL-SCNC: 106 MMOL/L — SIGNIFICANT CHANGE UP (ref 96–108)
CO2 SERPL-SCNC: 26 MMOL/L — SIGNIFICANT CHANGE UP (ref 22–31)
CREAT SERPL-MCNC: 1.5 MG/DL — HIGH (ref 0.5–1.3)
EOSINOPHIL # BLD AUTO: 0.14 K/UL — SIGNIFICANT CHANGE UP (ref 0–0.5)
EOSINOPHIL NFR BLD AUTO: 1 % — SIGNIFICANT CHANGE UP (ref 0–6)
GLUCOSE SERPL-MCNC: 178 MG/DL — HIGH (ref 70–99)
HCO3 BLDA-SCNC: 22 MMOL/L — LOW (ref 23–27)
HCO3 BLDA-SCNC: 23 MMOL/L — SIGNIFICANT CHANGE UP (ref 23–27)
HCT VFR BLD CALC: 50.6 % — HIGH (ref 39–50)
HGB BLD-MCNC: 15.4 G/DL — SIGNIFICANT CHANGE UP (ref 13–17)
HOROWITZ INDEX BLDA+IHG-RTO: 30 — SIGNIFICANT CHANGE UP
HOROWITZ INDEX BLDA+IHG-RTO: 99 — SIGNIFICANT CHANGE UP
LYMPHOCYTES # BLD AUTO: 1.72 K/UL — SIGNIFICANT CHANGE UP (ref 1–3.3)
LYMPHOCYTES # BLD AUTO: 12 % — LOW (ref 13–44)
MANUAL SMEAR VERIFICATION: SIGNIFICANT CHANGE UP
MCHC RBC-ENTMCNC: 27.7 PG — SIGNIFICANT CHANGE UP (ref 27–34)
MCHC RBC-ENTMCNC: 30.4 GM/DL — LOW (ref 32–36)
MCV RBC AUTO: 91 FL — SIGNIFICANT CHANGE UP (ref 80–100)
MONOCYTES # BLD AUTO: 1.44 K/UL — HIGH (ref 0–0.9)
MONOCYTES NFR BLD AUTO: 10 % — SIGNIFICANT CHANGE UP (ref 2–14)
NEUTROPHILS # BLD AUTO: 10.34 K/UL — HIGH (ref 1.8–7.4)
NEUTROPHILS NFR BLD AUTO: 72 % — SIGNIFICANT CHANGE UP (ref 43–77)
NRBC # BLD: 0 — SIGNIFICANT CHANGE UP
NRBC # BLD: SIGNIFICANT CHANGE UP /100 WBCS (ref 0–0)
PCO2 BLDA: 80 MMHG — CRITICAL HIGH (ref 32–46)
PCO2 BLDA: 86 MMHG — CRITICAL HIGH (ref 32–46)
PH BLDA: 7.16 — CRITICAL LOW (ref 7.35–7.45)
PH BLDA: 7.18 — CRITICAL LOW (ref 7.35–7.45)
PLAT MORPH BLD: NORMAL — SIGNIFICANT CHANGE UP
PLATELET # BLD AUTO: 292 K/UL — SIGNIFICANT CHANGE UP (ref 150–400)
PO2 BLDA: 347 MMHG — HIGH (ref 74–108)
PO2 BLDA: 84 MMHG — SIGNIFICANT CHANGE UP (ref 74–108)
POTASSIUM SERPL-MCNC: 5.1 MMOL/L — SIGNIFICANT CHANGE UP (ref 3.5–5.3)
POTASSIUM SERPL-SCNC: 5.1 MMOL/L — SIGNIFICANT CHANGE UP (ref 3.5–5.3)
PROT SERPL-MCNC: 8.1 G/DL — SIGNIFICANT CHANGE UP (ref 6–8.3)
RAPID RVP RESULT: SIGNIFICANT CHANGE UP
RBC # BLD: 5.56 M/UL — SIGNIFICANT CHANGE UP (ref 4.2–5.8)
RBC # FLD: 13.3 % — SIGNIFICANT CHANGE UP (ref 10.3–14.5)
RBC BLD AUTO: NORMAL — SIGNIFICANT CHANGE UP
SAO2 % BLDA: 94 % — SIGNIFICANT CHANGE UP (ref 92–96)
SAO2 % BLDA: 99 % — HIGH (ref 92–96)
SARS-COV-2 RNA SPEC QL NAA+PROBE: SIGNIFICANT CHANGE UP
SODIUM SERPL-SCNC: 139 MMOL/L — SIGNIFICANT CHANGE UP (ref 135–145)
VARIANT LYMPHS # BLD: 5 % — SIGNIFICANT CHANGE UP (ref 0–6)
WBC # BLD: 14.36 K/UL — HIGH (ref 3.8–10.5)
WBC # FLD AUTO: 14.36 K/UL — HIGH (ref 3.8–10.5)

## 2021-05-17 PROCEDURE — 71045 X-RAY EXAM CHEST 1 VIEW: CPT | Mod: 26,76

## 2021-05-17 PROCEDURE — 99291 CRITICAL CARE FIRST HOUR: CPT

## 2021-05-17 PROCEDURE — 99223 1ST HOSP IP/OBS HIGH 75: CPT | Mod: GC

## 2021-05-17 PROCEDURE — 93010 ELECTROCARDIOGRAM REPORT: CPT

## 2021-05-17 RX ORDER — IPRATROPIUM/ALBUTEROL SULFATE 18-103MCG
3 AEROSOL WITH ADAPTER (GRAM) INHALATION EVERY 6 HOURS
Refills: 0 | Status: DISCONTINUED | OUTPATIENT
Start: 2021-05-17 | End: 2021-05-17

## 2021-05-17 RX ORDER — DEXTROSE 50 % IN WATER 50 %
25 SYRINGE (ML) INTRAVENOUS ONCE
Refills: 0 | Status: DISCONTINUED | OUTPATIENT
Start: 2021-05-17 | End: 2021-05-19

## 2021-05-17 RX ORDER — MONTELUKAST 4 MG/1
10 TABLET, CHEWABLE ORAL AT BEDTIME
Refills: 0 | Status: DISCONTINUED | OUTPATIENT
Start: 2021-05-17 | End: 2021-05-19

## 2021-05-17 RX ORDER — IPRATROPIUM/ALBUTEROL SULFATE 18-103MCG
3 AEROSOL WITH ADAPTER (GRAM) INHALATION EVERY 6 HOURS
Refills: 0 | Status: DISCONTINUED | OUTPATIENT
Start: 2021-05-17 | End: 2021-05-18

## 2021-05-17 RX ORDER — DEXTROSE 50 % IN WATER 50 %
12.5 SYRINGE (ML) INTRAVENOUS ONCE
Refills: 0 | Status: DISCONTINUED | OUTPATIENT
Start: 2021-05-17 | End: 2021-05-19

## 2021-05-17 RX ORDER — CLOPIDOGREL BISULFATE 75 MG/1
75 TABLET, FILM COATED ORAL DAILY
Refills: 0 | Status: DISCONTINUED | OUTPATIENT
Start: 2021-05-17 | End: 2021-05-19

## 2021-05-17 RX ORDER — PANTOPRAZOLE SODIUM 20 MG/1
40 TABLET, DELAYED RELEASE ORAL DAILY
Refills: 0 | Status: DISCONTINUED | OUTPATIENT
Start: 2021-05-17 | End: 2021-05-19

## 2021-05-17 RX ORDER — DEXTROSE 50 % IN WATER 50 %
15 SYRINGE (ML) INTRAVENOUS ONCE
Refills: 0 | Status: DISCONTINUED | OUTPATIENT
Start: 2021-05-17 | End: 2021-05-19

## 2021-05-17 RX ORDER — SODIUM CHLORIDE 9 MG/ML
1000 INJECTION, SOLUTION INTRAVENOUS
Refills: 0 | Status: DISCONTINUED | OUTPATIENT
Start: 2021-05-17 | End: 2021-05-19

## 2021-05-17 RX ORDER — LOSARTAN POTASSIUM 100 MG/1
25 TABLET, FILM COATED ORAL DAILY
Refills: 0 | Status: DISCONTINUED | OUTPATIENT
Start: 2021-05-17 | End: 2021-05-19

## 2021-05-17 RX ORDER — INSULIN ASPART 100 [IU]/ML
0 INJECTION, SOLUTION SUBCUTANEOUS
Qty: 0 | Refills: 0 | DISCHARGE

## 2021-05-17 RX ORDER — ENOXAPARIN SODIUM 100 MG/ML
40 INJECTION SUBCUTANEOUS DAILY
Refills: 0 | Status: DISCONTINUED | OUTPATIENT
Start: 2021-05-17 | End: 2021-05-19

## 2021-05-17 RX ORDER — ALBUTEROL 90 UG/1
3 AEROSOL, METERED ORAL
Qty: 0 | Refills: 0 | DISCHARGE

## 2021-05-17 RX ORDER — METOPROLOL TARTRATE 50 MG
12.5 TABLET ORAL EVERY 12 HOURS
Refills: 0 | Status: DISCONTINUED | OUTPATIENT
Start: 2021-05-17 | End: 2021-05-19

## 2021-05-17 RX ORDER — GLUCAGON INJECTION, SOLUTION 0.5 MG/.1ML
1 INJECTION, SOLUTION SUBCUTANEOUS ONCE
Refills: 0 | Status: DISCONTINUED | OUTPATIENT
Start: 2021-05-17 | End: 2021-05-19

## 2021-05-17 RX ORDER — INSULIN LISPRO 100/ML
VIAL (ML) SUBCUTANEOUS EVERY 6 HOURS
Refills: 0 | Status: DISCONTINUED | OUTPATIENT
Start: 2021-05-17 | End: 2021-05-19

## 2021-05-17 RX ORDER — CHLORHEXIDINE GLUCONATE 213 G/1000ML
1 SOLUTION TOPICAL
Refills: 0 | Status: DISCONTINUED | OUTPATIENT
Start: 2021-05-17 | End: 2021-05-17

## 2021-05-17 RX ORDER — ASPIRIN/CALCIUM CARB/MAGNESIUM 324 MG
81 TABLET ORAL DAILY
Refills: 0 | Status: DISCONTINUED | OUTPATIENT
Start: 2021-05-17 | End: 2021-05-19

## 2021-05-17 RX ORDER — IPRATROPIUM/ALBUTEROL SULFATE 18-103MCG
3 AEROSOL WITH ADAPTER (GRAM) INHALATION
Refills: 0 | Status: COMPLETED | OUTPATIENT
Start: 2021-05-17 | End: 2021-05-17

## 2021-05-17 RX ORDER — TAMSULOSIN HYDROCHLORIDE 0.4 MG/1
0.4 CAPSULE ORAL AT BEDTIME
Refills: 0 | Status: DISCONTINUED | OUTPATIENT
Start: 2021-05-17 | End: 2021-05-19

## 2021-05-17 RX ORDER — ATORVASTATIN CALCIUM 80 MG/1
80 TABLET, FILM COATED ORAL AT BEDTIME
Refills: 0 | Status: DISCONTINUED | OUTPATIENT
Start: 2021-05-17 | End: 2021-05-19

## 2021-05-17 RX ADMIN — Medication 60 MILLIGRAM(S): at 19:21

## 2021-05-17 RX ADMIN — ENOXAPARIN SODIUM 40 MILLIGRAM(S): 100 INJECTION SUBCUTANEOUS at 19:22

## 2021-05-17 RX ADMIN — PANTOPRAZOLE SODIUM 40 MILLIGRAM(S): 20 TABLET, DELAYED RELEASE ORAL at 19:22

## 2021-05-17 RX ADMIN — Medication 3 MILLILITER(S): at 09:23

## 2021-05-17 RX ADMIN — Medication 125 MILLIGRAM(S): at 10:03

## 2021-05-17 RX ADMIN — ATORVASTATIN CALCIUM 80 MILLIGRAM(S): 80 TABLET, FILM COATED ORAL at 21:09

## 2021-05-17 RX ADMIN — Medication 3 MILLILITER(S): at 09:34

## 2021-05-17 RX ADMIN — MONTELUKAST 10 MILLIGRAM(S): 4 TABLET, CHEWABLE ORAL at 21:09

## 2021-05-17 RX ADMIN — Medication 3 MILLILITER(S): at 20:35

## 2021-05-17 RX ADMIN — Medication 81 MILLIGRAM(S): at 19:20

## 2021-05-17 RX ADMIN — Medication 3: at 23:18

## 2021-05-17 RX ADMIN — Medication 12.5 MILLIGRAM(S): at 19:22

## 2021-05-17 RX ADMIN — LOSARTAN POTASSIUM 25 MILLIGRAM(S): 100 TABLET, FILM COATED ORAL at 19:23

## 2021-05-17 RX ADMIN — TAMSULOSIN HYDROCHLORIDE 0.4 MILLIGRAM(S): 0.4 CAPSULE ORAL at 21:09

## 2021-05-17 RX ADMIN — Medication 1: at 19:21

## 2021-05-17 RX ADMIN — CLOPIDOGREL BISULFATE 75 MILLIGRAM(S): 75 TABLET, FILM COATED ORAL at 19:21

## 2021-05-17 RX ADMIN — Medication 1 DROP(S): at 19:24

## 2021-05-17 RX ADMIN — Medication 3 MILLILITER(S): at 09:54

## 2021-05-17 NOTE — PATIENT PROFILE ADULT. - FUNCTIONAL SCREEN CURRENT LEVEL: COMMUNICATION, MLM
Patient reports that he has the same sexual partner since February. He has been noticing some clear/white discharge out of his penis for the past 1.5 weeks.    (0) understands/communicates without difficulty

## 2021-05-17 NOTE — PHARMACOTHERAPY INTERVENTION NOTE - COMMENTS
Patient's medication reconciliation completed by pharmacy representative. Recent updates to outpatient medication regimen. Reached out to NP (Jovan) to discuss updated regimen. Patient taking plavix 75 mg and metoprolol 12.5 mg BID at home. NP approved updates to regimen to reflect home medications.

## 2021-05-17 NOTE — H&P ADULT - NSHPLABSRESULTS_GEN_ALL_CORE
Labs:                          15.4   14.36 )-----------( [phone]      ( 17 May 2021 09:59 )             50.6       05-17    139  |  106  |  24<H>  ----------------------------<  178<H>  5.1   |  26  |  1.50<H>    Ca    9.9      17 May 2021 10:40    TPro  8.1  /  Alb  4.1  /  TBili  0.3  /  DBili  x   /  AST  18  /  ALT  19  /  AlkPhos  101  05-17          ABG - ( 17 May 2021 11:42 )  pH, Arterial: 7.18  pH, Blood: x     /  pCO2: 80    /  pO2: 84    / HCO3: 22    / Base Excess: 1.0   /  SaO2: 94                          Lactate Trend      CARDIAC MARKERS ( 17 May 2021 10:40 )  <.015 ng/mL / x     / x     / x     / x            CAPILLARY BLOOD GLUCOSE      POCT Blood Glucose.: 191 mg/dL (17 May 2021 09:24)      EKG:   Personally Reviewed:  [ ] YES     Imaging:   Personally Reviewed:  [ ] YES

## 2021-05-17 NOTE — ED ADULT NURSE NOTE - OBJECTIVE STATEMENT
Pt presents to the Ed via ambulance with 100% NRB mask in place s/p SOB Pt is only able to speak in phrases. EKG done, pt placed on cardiac monitor. Pt's spouse states" The SOB started on Mother's Day and only got worse."

## 2021-05-17 NOTE — CONSULT NOTE ADULT - ATTENDING COMMENTS
Patient seen and examined independently    80M HTN, CAD, PCI, CABG, PVD, DM2, COPD/asthma on O2 and qhs BiPAP, cardiac arrest (resp mediated) in Oct 2018 (ROSC after 17 mins, s/p hypothermia protocol), now presents with gradually worsening dyspnea over the last few days not relieved with extra albuterol, steroid and bipap, wife suspects exposure to flowers may have triggered his symptoms, admitted for acute hypercapnic resp failure due to COPD exacerbation.     Clinically improved with NIV, ABG essentially unchanged   Admitted to ICU for further care   He is more awake and interactive now, will hold off on additional ABGs and follow clinical status   Solumedrol, levofloxacin, BDs   Keep NPO for now   Continue other meds   Lovenox for DVT ppx  Patient critically ill     Wife updated at bedside

## 2021-05-17 NOTE — ED PROVIDER NOTE - LAB INTERPRETATION
Respiratory Viral Panel with COVID-19 by RUPALI (05.17.21 @ 09:53)   Rapid RVP Result: [facility]   SARS-CoV-2: [facility]: This Respiratory Panel uses polymerase chain reaction (PCR) to detect for   adenovirus; coronavirus (HKU1, NL63, 229E, OC43); human metapneumovirus   (hMPV); human enterovirus/rhinovirus (Entero/RV); influenza A; influenza   A/H1; influenza A/H3; influenza A/H1-2009; influenza B; parainfluenza   viruses 1, 2, 3, 4; respiratory syncytial virus; Mycoplasma pneumoniae;   Chlamydophila pneumoniae; and SARS-CoV-2.

## 2021-05-17 NOTE — CONSULT NOTE ADULT - SUBJECTIVE AND OBJECTIVE BOX
This is an 80 M PMH of COPD (On home O2 3L and nocturnal bilevel), eosinophilic asthma, CAD (w/ 3v CABG ), s/p 2 recent coronary stents at [place], [facility] s/p LLE stent (2019), HLD, DM2 on metforming, BPH, hx Hypercapnic Respiratory Failure/Cardiac Arrest requiring intubation (), multiple frequent admissions for COPD exacerbations, who presents with SOB x 1.  He was BIBEMS accompanied by his wife after noticing increasing somnolence.  She states that he has not had any sick contacts and that the only thing she can attribute to his exacerbation is change of season with pollen exposure.  Recently completed COVID vaccination series ().  He is lethargic but fully arousable to name.  His wife denies any fever, cough, chest pain, increased sputum production, recent weight gain/loss.      In the ER, he was found to be hypercarbic with abg 7.16/86/347/23.  He was placed on bilevel (15/5/30%) with average Ve 10L.  Pending repeat blood gas 1 hour post administration of bilevel.   He was given methylprednisolone 125 and duonebs.      ICU consulted for hypercarbic resp failure 2/2 copd exacerbation    Subjective:    Review of Systems           Constitutional: denies fever, chills, general malaise, fatigue, weight loss, weight gain, diaphoresis   HEENT: denies dry mouth, sore throat, runny nose, photophobia, blurry vision, double vision, discharge, eye pain, difficulty hearing, vertigo, dysphagia, epistaxis, recent dental work    Neck: denies pain or stiffness  Respiratory: +SOB.  denies LA, cough, phlegm, wheezing, hemoptysis  Cardiovascular: denies CP, palpitations, edema, diaphoresis   Gastrointestinal: denies nausea, vomiting or hematemesis, diarrhea, constipation, abdominal or epigastric pain, melena or hematochezia   Genitourinary: denies dysuria, frequency, urgency, incontinence, hematuria   Skin: denies rash, hives, itching, burning, masses  Musculoskeletal: denies myalgias, arthritis, joint swelling, muscle weakness  Neurologic: denies syncope, LOC, headache, weakness, dizziness, parasthesias, numbness, seizures, confusion, dementia   Psychiatric: denies feeling anxious, depressed, suicidal, homicidal  Endocrine: denies increased fingerstick glucoses, cold or heat intolerance, polydipsia, polyuria, polyphagia, hair loss  Hematology/Oncology: denies bruising, tender or enlarged lymph nodes   Allergy/immunologic: denies hives or eczema  ROS negative x 10 systems except as noted above      Patient is a 81y old  Male who presents with a chief complaint of   HPI:    PAST MEDICAL & SURGICAL HISTORY:  Diabetes Mellitus, Type II    CAD (Coronary Artery Disease)  s/p 3v CABG ; [place]     Dyslipidemia    Osteomyelitis    COPD (chronic obstructive pulmonary disease)  on 2L at home and BiPAP at night; intubated     Hypertension    PVD (peripheral vascular disease)    CABG (Coronary Artery Bypass Graft)      Compound fracture  left leg    S/P primary angioplasty with coronary stent      FAMILY HISTORY:  Family history of diabetes mellitus (Sibling)    Family hx of lung cancer  brother,  age 82, used to smoke with pt        Vitals   ICU Vital Signs Last 24 Hrs  T(C): --  T(F): --  HR: 112 (17 May 2021 10:03) (107 - 113)  BP: 136/85 (17 May 2021 09:55) (136/85 - 168/105)  BP(mean): --  ABP: --  ABP(mean): --  RR: 26 (17 May 2021 09:55) (24 - 26)  SpO2: 100% (17 May 2021 10:07) (99% - 100%)      Physical Exam:   Constitutional: NAD, well-groomed, well-developed, obese  HEENT: PERRLA, EOMI, no drainage or redness  Neck: supple,  No JVD, Trachea midline  Back: Normal spine flexure, No CVA tenderness, No deformity or limitation of movement  Respiratory: Breath Sounds equal bilaterally to auscultation, no accessory muscle use noted, slight expiratory wheeze noted bilaterally  Cardiovascular: Regular rate, NSR, normal S1, S2; no murmurs or rub  Gastrointestinal: Soft, non-tender, slightly distended, no hepatosplenomegaly, normal bowel sounds  Extremities: ZIMMERMAN x 4, +1 peripheral edema to b/l LE, no cyanosis, no clubbing   Vascular: Equal and normal pulses: 2+ peripheral pulses throughout  Neurological: Lethargic, oriented x 2-3; speech clear and intact; no sensory, motor  deficits, normal reflexes  Psychiatric: calm, normal mood, normal affect  Musculoskeletal: No joint swelling or deformity; no limitation of movement  Skin: warm, dry, well perfused, no rashes      ABG - ( 17 May 2021 09:39 )  pH, Arterial: 7.16  pH, Blood: x     /  pCO2: 86    /  pO2: 347   / HCO3: 23    / Base Excess: 1.6   /  SaO2: 99                  I&O's Detail      LABS                        15.4   14.36 )-----------( 292      ( 17 May 2021 09:59 )             50.6                       POCT Blood Glucose.: 191 mg/dL *H* (21 @ 09:24)        MEDICATIONS  (STANDING):    MEDICATIONS  (PRN):      Allergies:  No Known Allergies  shellfish (Nausea)               This is an 80 M PMH of COPD (On home O2 3L and nocturnal bilevel), eosinophilic asthma, CAD (w/ 3v CABG ), s/p 2 recent coronary stents at [place], [facility] s/p LLE stent (2019), HLD, DM2 on metforming, BPH, hx Hypercapnic Respiratory Failure/Cardiac Arrest requiring intubation (), multiple frequent admissions for COPD exacerbations, who presents with SOB x 1.  He was BIBEMS accompanied by his wife after noticing increasing somnolence.  She states that he has not had any sick contacts and that the only thing she can attribute to his exacerbation is change of season with pollen exposure.  Recently completed COVID vaccination series ().  He is lethargic but fully arousable to name.  His wife denies any fever, cough, chest pain, increased sputum production, recent weight gain/loss.      In the ER, he was found to be hypercarbic with abg 7.16/86/347/23.  He was placed on bilevel (15/5/30%) with average Ve 10L.  Pending repeat blood gas 1 hour post administration of bilevel.   He was given methylprednisolone 125 and duonebs.      ICU consulted for hypercarbic resp failure 2/2 copd exacerbation    Subjective:    Review of Systems           Constitutional: denies fever, chills, general malaise, fatigue, weight loss, weight gain, diaphoresis   HEENT: denies dry mouth, sore throat, runny nose, photophobia, blurry vision, double vision, discharge, eye pain, difficulty hearing, vertigo, dysphagia, epistaxis, recent dental work    Neck: denies pain or stiffness  Respiratory: +SOB.  denies LA, cough, phlegm, wheezing, hemoptysis  Cardiovascular: denies CP, palpitations, edema, diaphoresis   Gastrointestinal: denies nausea, vomiting or hematemesis, diarrhea, constipation, abdominal or epigastric pain, melena or hematochezia   Genitourinary: denies dysuria, frequency, urgency, incontinence, hematuria   Skin: denies rash, hives, itching, burning, masses  Musculoskeletal: denies myalgias, arthritis, joint swelling, muscle weakness  Neurologic: denies syncope, LOC, headache, weakness, dizziness, parasthesias, numbness, seizures, confusion, dementia   Psychiatric: denies feeling anxious, depressed, suicidal, homicidal  Endocrine: denies increased fingerstick glucoses, cold or heat intolerance, polydipsia, polyuria, polyphagia, hair loss  Hematology/Oncology: denies bruising, tender or enlarged lymph nodes   Allergy/immunologic: denies hives or eczema  ROS negative x 10 systems except as noted above      Patient is a 81y old  Male who presents with a chief complaint of   HPI:    PAST MEDICAL & SURGICAL HISTORY:  Diabetes Mellitus, Type II    CAD (Coronary Artery Disease)  s/p 3v CABG ; stents placed [place] in     Dyslipidemia    Osteomyelitis    COPD (chronic obstructive pulmonary disease)  on 2L at home and BiPAP at night; intubated     Hypertension    PVD (peripheral vascular disease)    CABG (Coronary Artery Bypass Graft)      Compound fracture  left leg    S/P primary angioplasty with coronary stent      FAMILY HISTORY:  Family history of diabetes mellitus (Sibling)    Family hx of lung cancer  brother,  age 82, used to smoke with pt        Vitals   ICU Vital Signs Last 24 Hrs  T(C): --  T(F): --  HR: 112 (17 May 2021 10:03) (107 - 113)  BP: 136/85 (17 May 2021 09:55) (136/85 - 168/105)  BP(mean): --  ABP: --  ABP(mean): --  RR: 26 (17 May 2021 09:55) (24 - 26)  SpO2: 100% (17 May 2021 10:07) (99% - 100%)      Physical Exam:   Constitutional: NAD, well-groomed, well-developed, obese  HEENT: PERRLA, EOMI, no drainage or redness  Neck: supple,  No JVD, Trachea midline  Back: Normal spine flexure, No CVA tenderness, No deformity or limitation of movement  Respiratory: Breath Sounds equal bilaterally to auscultation, no accessory muscle use noted, slight expiratory wheeze noted bilaterally  Cardiovascular: Regular rate, NSR, normal S1, S2; no murmurs or rub  Gastrointestinal: Soft, non-tender, slightly distended, no hepatosplenomegaly, normal bowel sounds  Extremities: ZIMMERMAN x 4, +1 peripheral edema to b/l LE, no cyanosis, no clubbing   Vascular: Equal and normal pulses: 2+ peripheral pulses throughout  Neurological: Lethargic, oriented x 2-3; speech clear and intact; no sensory, motor  deficits, normal reflexes  Psychiatric: calm, normal mood, normal affect  Musculoskeletal: No joint swelling or deformity; no limitation of movement  Skin: warm, dry, well perfused, no rashes      ABG - ( 17 May 2021 09:39 )  pH, Arterial: 7.16  pH, Blood: x     /  pCO2: 86    /  pO2: 347   / HCO3: 23    / Base Excess: 1.6   /  SaO2: 99                  I&O's Detail      LABS                        15.4   14.36 )-----------( 292      ( 17 May 2021 09:59 )             50.6                       POCT Blood Glucose.: 191 mg/dL *H* (21 @ 09:24)        MEDICATIONS  (STANDING):    MEDICATIONS  (PRN):      Allergies:  No Known Allergies  shellfish (Nausea)              CRITICAL CARE TIME SPENT: 40 Min  (Assessing presenting problems of acute illness, which pose high probability of life threatening deterioration or end organ damage/dysfunction, as well as medical decision making including initiating plan of care, reviewing data, reviewing radiologic exams, discussing with multidisciplinary team,  discussing goals of care with patient/family, and writing this note.  Non-inclusive of procedures performed)

## 2021-05-17 NOTE — H&P ADULT - HISTORY OF PRESENT ILLNESS
81M with CAD, COPD (3L Home O2 + BIPAP at night), DM2 presents to the ED today for increasing confusion and lethargy along with SOB. Most of the history was obtained from the chart and his spouse, as the patient himself was very lethargic to give a meaningful history. Patient is well known to us due to multiple admissions in the past for Hypercapnic Respiratory Failure.  He has required intubation in the past and his last admission was in October 2020, also for hypercapnia.     Today in the ED the patient was found to be somnolent, routine labs and imaging shows patient with a pH of 7.16 and pCO2 of 86.  He was placed on BIPAP, given nebulizers, steroids and antibiotics. CXR was negative for consolidation, infiltrates or fluid. Spouse reports recent completion of COVID vaccination series and denies any recent travel, sick contacts, fevers or chill at home. Patients repeat 1 hour blood gas with minimal improvement and as been admitted to our ICU for further management.

## 2021-05-17 NOTE — CONSULT NOTE ADULT - SUBJECTIVE AND OBJECTIVE BOX
YUE MARISSAALAN    [facility] ED    Patient is a 81y old  Male who presents with a chief complaint of      Allergies    No Known Allergies    Intolerances    shellfish (Nausea)      HPI:      PAST MEDICAL & SURGICAL HISTORY:  Diabetes Mellitus, Type II    CAD (Coronary Artery Disease)  s/p 3v CABG ; stents [place]    Dyslipidemia    Osteomyelitis    COPD (chronic obstructive pulmonary disease)  on 2L at home and BiPAP at night; intubated     Hypertension    PVD (peripheral vascular disease)    CABG (Coronary Artery Bypass Graft)      Compound fracture  left leg    S/P primary angioplasty with coronary stent        FAMILY HISTORY:  Family history of diabetes mellitus (Sibling)    Family hx of lung cancer  brother,  age 82, used to smoke with pt          MEDICATIONS   albuterol/ipratropium for Nebulization 3 milliLiter(s) Nebulizer every 20 minutes  methylPREDNISolone sodium succinate Injectable 125 milliGRAM(s) IV Push once      Vital Signs Last 24 Hrs  T(C): --  T(F): --  HR: 110 (17 May 2021 09:06) (110 - 110)  BP: 168/105 (17 May 2021 09:06) (168/105 - 168/105)  BP(mean): --  RR: 24 (17 May 2021 09:06) (24 - 24)  SpO2: 100% (17 May 2021 09:06) (100% - 100%)            LABS:                    WBC:      MICROBIOLOGY:  RECENT CULTURES:                  Sodium:          Hemoglobin:      Platelets:             RADIOLOGY & ADDITIONAL STUDIES:

## 2021-05-17 NOTE — H&P ADULT - ASSESSMENT
81M with CAD, COPD (3L Home O2 + BIPAP at night), DM2, HLD and BPH admitted for Hypercapnic Respiratory Failure.     Acute Hypercapnic Respiratory Failure / Acute Metabolic Encephalopathy / COPD  Hypercapnia related to underlying COPD; On BIPAP support  Monitor blood gas and for clinical improvement   Nebulizers + Steroids + Antibiotics   COPD with baseline 3L at home along with BIPAP PRN   Consider Right Sided Cardiac evaluation with Echocardigoram  Management as per Pulmonary Critical Care (Dr. Dejesus)    CAD  History of CABG and PCI x 3   Aspirin + Losartan  Last Echocardiogram noted; Consider repeat       DM2  Will be NPO so hold home meds  ISS and FS q6H and maintain BS < 180    BPH  Flomax    Diet  NPO    DVT Prophylaxis  SCD    Disposition  Full Code/Inpatient  Currently in ICU for critical care management   Dispo pending hospital course

## 2021-05-17 NOTE — H&P ADULT - NSHPPHYSICALEXAM_GEN_ALL_CORE
PHYSICAL EXAM:  Vital Signs Last 24 Hrs  T(C): 36.5 (17 May 2021 12:43), Max: 36.5 (17 May 2021 12:43)  T(F): 97.7 (17 May 2021 12:43), Max: 97.7 (17 May 2021 12:43)  HR: 113 (17 May 2021 13:23) (107 - 119)  BP: 153/86 (17 May 2021 12:43) (136/85 - 168/105)  BP(mean): --  RR: 25 (17 May 2021 12:43) (24 - 27)  SpO2: 99% (17 May 2021 13:23) (98% - 100%)    GENERAL: Respiratory Distress; Lethargic   HEAD:  Atraumatic, Normocephalic  NECK: Supple, No JVD, Normal thyroid  CHEST/LUNG: Clear to auscultation bilaterally; No rales, rhonchi, wheezing, or rubs  HEART: Regular rate and rhythm; No murmurs, rubs, or gallops  ABDOMEN: Soft, Nontender, Nondistended; Bowel sounds present  EXTREMITIES:  2+ Peripheral Pulses, No clubbing, cyanosis, or edema  LYMPH: No lymphadenopathy noted  SKIN: No rashes or lesions

## 2021-05-17 NOTE — CONSULT NOTE ADULT - ASSESSMENT
REBEKAHRODNEYALAN TURNER [facility] P [phone]  1940 DOA 2021 HOSPITALIST       Initial evaluation/Pulmonary Critical Care consultation requested on 2021   by Dr FABIAN   from Dr Dejesus   Patient examined chart reviewed    HOSPITAL ADMISSION   PATIENT CAME  FROM (if information available)      COMMODORE TURNER [facility] P [phone]  1940 DOA 2021 HOSPITALIST      REVIEW OF SYMPTOMS      Able to give (reliable) ROS  NO     PHYSICAL EXAM    HEENT Unremarkable  atraumatic   RESP Fair air entry EXP prolonged    Harsh breath sound Resp distres mild   CARDIAC S1 S2 No S3     NO JVD    ABDOMEN SOFT BS PRESENT NOT DISTENDED No hepatosplenomegaly   PEDAL EDEMA present No calf tenderness  NO rash       PATIENT PRESENTATION.   79 m former smoker PMH HTN, DM2 (on home Metformin and glipizide), COPD (2L home/ BIPAP at night), CAD with 3v CABG , Stent 2019 HLD, ECHO 2020 ECHO preserved lvsf dd1 ivc dilated but appears 50% collapse with respn    10/26/2018 ECHO ef 55% hx hypercapnic resp failure with ho intubation c/b with cardiac arrest (2018) with ho recurrent admissions GOLD GRADE D admitted 2021 with aoc hypercapnic resp failure sec t copd ex   Pulm consltd 2021       Home meds.   breo azithro mwf flonase asa 81 plavx incruse losartan 25 metoprolol 12.5x2 montelukast tamsulosin       MARISSAALAN TURNER [facility] P 81 m  PROBLEMS 2021 admission    RESP FAILURE poa   COPD ex poa   AOC HYPERCAPNIC RESP FAILURE poa  pH 716  BPAP Started 2021   COVID VACCINATION 2 WK PTA     PMH FORMER SMOKER  PMH COPD GRADE D 2L NOCT BPAP   PMH INTUBATION   PMH CAC 2018   PMH CAD 3V CABG   PMH CAD STENT 2019   PMH HFPEF 2020 DD1   PMH DM2        PATIENT DATA                ABG.     2021 9a nrb 716/86/347               OXYGENATION.                   VITALS/IO/VENT/DRIPS.     2021 112 130/80   2021 15//60 940a    ASSESSMENT/RECOMMENDATIONS.    RESP TRACT INFECTION   W 2021 w 14.3   Check cult pr   empiric abio   AOC RESP FAILURE poa   BPAP or avaps   Optimize abg with q 2 h check and intubation if fails nppv   COPD ex  BD steroids   CAD  Cont asa plavx metoprolol arb   RO VTE   Check venous duplx       TIME SPENT   Over 55 minutes aggregate care time spent on encounter; activities included   direct patient care, counseling and/or coordinating care reviewing notes, lab data/ imaging , discussion with multidisciplinary team/ patient  /family and explaining in detail risks, benefits, alternatives  of the recommendations     COMMODORE TURNER [facility] P [phone]  1940 DOA 2021 HOSPITALIST

## 2021-05-17 NOTE — CONSULT NOTE ADULT - ASSESSMENT
-Not an ICU candidate at this time 80 M PMH of COPD (On home O2 3L and nocturnal bilevel), eosinophilic asthma, CAD (w/ 3v CABG 2004), s/p 2 recent coronary stents at [place], P[place] s/p LLE stent (11/2019), HLD, DM2 on metforming, BPH, hx Hypercapnic Respiratory Failure/Cardiac Arrest requiring intubation (6/18), multiple frequent admissions for COPD exacerbations, who presents with SOB x 1 and a/w COPD exacerbation req bilevel.    Hypercapnic Respiratory Failure  COPD exacerbation  Respiratory Acidosis  Altered mental status    - Hypercapnic Resp Failure 2/2 COPD exacerbation  - Multiple admission in the past year for COPD exacerbations, some requiring ICU level care  - Lethargic, but arousable to name   - Last ABG 7.16/86/349/23 on room air; now on bilevel  - S/p methylprednisolone 125mg IVP x 1 and Duoneb x 1  - C/w steroids  - Would start ciprofloxacin 500mg IV daily x 3 days  - Continue Duonebs ATC  - Pending ABG 1 hour post initiation of bilevel; will follow- up results  - Pulm following  - Continuous pulse oximetry; would admit to telemetry   - Lethargy should improve with bilevel  - Not an ICU candidate at this time  - Please reconsult as needed  - Case discussed and plan formulated with ICU attending Dr. Hodges 80 M PMH of COPD (On home O2 3L and nocturnal bilevel), eosinophilic asthma, CAD (w/ 3v CABG 2004), s/p 2 recent coronary stents at [place], [place] s/p LLE stent (11/2019), HLD, DM2 on metforming, BPH, hx Hypercapnic Respiratory Failure/Cardiac Arrest requiring intubation (6/18), multiple frequent admissions for COPD exacerbations, who presents with SOB x 1 and a/w COPD exacerbation req bilevel.    Hypercapnic Respiratory Failure  COPD exacerbation  Respiratory Acidosis  Altered mental status    - Hypercapnic Resp Failure 2/2 COPD exacerbation  - Multiple admission in the past year for COPD exacerbations, some requiring ICU level care  - Lethargic, but arousable to name   - Last ABG 7.16/86/349/23 on room air; now on bilevel  - S/p methylprednisolone 125mg IVP x 1 and Duoneb x 1  - Will start methylprednisolone 60 IVP BID  - Would start ciprofloxacin 500mg IV daily x 3 days  - Continue Duonebs ATC  - Will start levofloxacin 500 daily  - Pending ABG 1 hour post initiation of bilevel; will follow- up results  - Bilateral SCDs  - NPO for now; pantoprazole 40 daily for SUP while NPO  - ISS; FS q6h  - Pulm following  - Continuous pulse oximetry  - Lethargy should improve with bilevel  - Admit to ICU  - Case discussed and plan formulated with ICU attending Dr. Hodges 80 M PMH of COPD (On home O2 3L and nocturnal bilevel), eosinophilic asthma, CAD (w/ 3v CABG 2004), s/p 2 recent coronary stents at [place], PVD s/p LLE stent (11/2019), HLD, DM2 on metforming, BPH, hx Hypercapnic Respiratory Failure/Cardiac Arrest requiring intubation (6/18), multiple frequent admissions for COPD exacerbations, who presents with SOB x 1 and a/w COPD exacerbation req bilevel.    Hypercapnic Respiratory Failure  COPD exacerbation  Respiratory Acidosis  Altered mental status    - Hypercapnic Resp Failure 2/2 COPD exacerbation  - Multiple admission in the past year for COPD exacerbations, some requiring ICU level care  - Lethargic, but arousable to name   - Last ABG 7.16/86/349/23 on room air; now on bilevel  - S/p methylprednisolone 125mg IVP x 1 and Duoneb x 1  - Will start methylprednisolone 60 IVP BID  - Would start ciprofloxacin 500mg IV daily x 3 days  - Continue Duonebs ATC  - Will start levofloxacin 500 daily  - Pending ABG 1 hour post initiation of bilevel; will follow- up results  - Bilateral SCDs  - NPO for now; pantoprazole 40 daily for SUP while NPO  - CKD; strict I&Os  - ISS; FS q6h  - Pulm following  - Continuous pulse oximetry  - Lethargy should improve with bilevel  - Admit to ICU  - Case discussed and plan formulated with ICU attending Dr. Hodges

## 2021-05-17 NOTE — ED PROVIDER NOTE - OBJECTIVE STATEMENT
pt bib ems for resp distress. pt unable to provide full hx due to resp distress, only able to speak few words at time. pt relates sob began yest. pt denies fever, cp, cough, abd pain. pt received both doses covid vaccine > 2 weeks ago.  pmd - olga  pulm - katie

## 2021-05-18 ENCOUNTER — TRANSCRIPTION ENCOUNTER (OUTPATIENT)
Age: 81
End: 2021-05-18

## 2021-05-18 ENCOUNTER — NON-APPOINTMENT (OUTPATIENT)
Age: 81
End: 2021-05-18

## 2021-05-18 LAB
A1C WITH ESTIMATED AVERAGE GLUCOSE RESULT: 7.1 % — HIGH (ref 4–5.6)
ALBUMIN SERPL ELPH-MCNC: 3.8 G/DL — SIGNIFICANT CHANGE UP (ref 3.3–5)
ALP SERPL-CCNC: 95 U/L — SIGNIFICANT CHANGE UP (ref 40–120)
ALT FLD-CCNC: 19 U/L — SIGNIFICANT CHANGE UP (ref 12–78)
ANION GAP SERPL CALC-SCNC: 10 MMOL/L — SIGNIFICANT CHANGE UP (ref 5–17)
AST SERPL-CCNC: 9 U/L — LOW (ref 15–37)
BILIRUB SERPL-MCNC: 0.4 MG/DL — SIGNIFICANT CHANGE UP (ref 0.2–1.2)
BUN SERPL-MCNC: 30 MG/DL — HIGH (ref 7–23)
CALCIUM SERPL-MCNC: 9.9 MG/DL — SIGNIFICANT CHANGE UP (ref 8.5–10.1)
CHLORIDE SERPL-SCNC: 103 MMOL/L — SIGNIFICANT CHANGE UP (ref 96–108)
CO2 SERPL-SCNC: 29 MMOL/L — SIGNIFICANT CHANGE UP (ref 22–31)
COVID-19 SPIKE DOMAIN AB INTERP: POSITIVE
COVID-19 SPIKE DOMAIN ANTIBODY RESULT: >250 U/ML — HIGH
CREAT SERPL-MCNC: 1.7 MG/DL — HIGH (ref 0.5–1.3)
ESTIMATED AVERAGE GLUCOSE: 157 MG/DL — HIGH (ref 68–114)
GLUCOSE SERPL-MCNC: 174 MG/DL — HIGH (ref 70–99)
HCT VFR BLD CALC: 46 % — SIGNIFICANT CHANGE UP (ref 39–50)
HGB BLD-MCNC: 14.1 G/DL — SIGNIFICANT CHANGE UP (ref 13–17)
MAGNESIUM SERPL-MCNC: 2.4 MG/DL — SIGNIFICANT CHANGE UP (ref 1.6–2.6)
MCHC RBC-ENTMCNC: 27.9 PG — SIGNIFICANT CHANGE UP (ref 27–34)
MCHC RBC-ENTMCNC: 30.7 GM/DL — LOW (ref 32–36)
MCV RBC AUTO: 90.9 FL — SIGNIFICANT CHANGE UP (ref 80–100)
NRBC # BLD: 0 /100 WBCS — SIGNIFICANT CHANGE UP (ref 0–0)
PHOSPHATE SERPL-MCNC: 3.6 MG/DL — SIGNIFICANT CHANGE UP (ref 2.5–4.5)
PLATELET # BLD AUTO: 246 K/UL — SIGNIFICANT CHANGE UP (ref 150–400)
POTASSIUM SERPL-MCNC: 4.7 MMOL/L — SIGNIFICANT CHANGE UP (ref 3.5–5.3)
POTASSIUM SERPL-SCNC: 4.7 MMOL/L — SIGNIFICANT CHANGE UP (ref 3.5–5.3)
PROT SERPL-MCNC: 7.7 G/DL — SIGNIFICANT CHANGE UP (ref 6–8.3)
RBC # BLD: 5.06 M/UL — SIGNIFICANT CHANGE UP (ref 4.2–5.8)
RBC # FLD: 13.2 % — SIGNIFICANT CHANGE UP (ref 10.3–14.5)
SARS-COV-2 IGG+IGM SERPL QL IA: >250 U/ML — HIGH
SARS-COV-2 IGG+IGM SERPL QL IA: POSITIVE
SODIUM SERPL-SCNC: 142 MMOL/L — SIGNIFICANT CHANGE UP (ref 135–145)
WBC # BLD: 6.49 K/UL — SIGNIFICANT CHANGE UP (ref 3.8–10.5)
WBC # FLD AUTO: 6.49 K/UL — SIGNIFICANT CHANGE UP (ref 3.8–10.5)

## 2021-05-18 PROCEDURE — 99233 SBSQ HOSP IP/OBS HIGH 50: CPT | Mod: GC

## 2021-05-18 PROCEDURE — 99232 SBSQ HOSP IP/OBS MODERATE 35: CPT

## 2021-05-18 RX ORDER — TIOTROPIUM BROMIDE 18 UG/1
1 CAPSULE ORAL; RESPIRATORY (INHALATION) DAILY
Refills: 0 | Status: DISCONTINUED | OUTPATIENT
Start: 2021-05-18 | End: 2021-05-19

## 2021-05-18 RX ORDER — BUDESONIDE AND FORMOTEROL FUMARATE DIHYDRATE 160; 4.5 UG/1; UG/1
2 AEROSOL RESPIRATORY (INHALATION)
Refills: 0 | Status: DISCONTINUED | OUTPATIENT
Start: 2021-05-18 | End: 2021-05-19

## 2021-05-18 RX ORDER — ALBUTEROL 90 UG/1
2 AEROSOL, METERED ORAL EVERY 6 HOURS
Refills: 0 | Status: DISCONTINUED | OUTPATIENT
Start: 2021-05-18 | End: 2021-05-19

## 2021-05-18 RX ADMIN — Medication 60 MILLIGRAM(S): at 05:31

## 2021-05-18 RX ADMIN — Medication 81 MILLIGRAM(S): at 12:28

## 2021-05-18 RX ADMIN — CLOPIDOGREL BISULFATE 75 MILLIGRAM(S): 75 TABLET, FILM COATED ORAL at 12:28

## 2021-05-18 RX ADMIN — Medication 1: at 05:31

## 2021-05-18 RX ADMIN — Medication 40 MILLIGRAM(S): at 12:28

## 2021-05-18 RX ADMIN — ENOXAPARIN SODIUM 40 MILLIGRAM(S): 100 INJECTION SUBCUTANEOUS at 12:27

## 2021-05-18 RX ADMIN — Medication 3 MILLILITER(S): at 01:57

## 2021-05-18 RX ADMIN — Medication 1 DROP(S): at 05:31

## 2021-05-18 RX ADMIN — Medication 4: at 18:39

## 2021-05-18 RX ADMIN — MONTELUKAST 10 MILLIGRAM(S): 4 TABLET, CHEWABLE ORAL at 21:38

## 2021-05-18 RX ADMIN — TAMSULOSIN HYDROCHLORIDE 0.4 MILLIGRAM(S): 0.4 CAPSULE ORAL at 21:38

## 2021-05-18 RX ADMIN — ATORVASTATIN CALCIUM 80 MILLIGRAM(S): 80 TABLET, FILM COATED ORAL at 21:38

## 2021-05-18 RX ADMIN — Medication 5: at 12:29

## 2021-05-18 RX ADMIN — Medication 1 DROP(S): at 18:38

## 2021-05-18 RX ADMIN — Medication 3 MILLILITER(S): at 08:48

## 2021-05-18 RX ADMIN — Medication 12.5 MILLIGRAM(S): at 18:38

## 2021-05-18 RX ADMIN — PANTOPRAZOLE SODIUM 40 MILLIGRAM(S): 20 TABLET, DELAYED RELEASE ORAL at 18:38

## 2021-05-18 NOTE — PROGRESS NOTE ADULT - ASSESSMENT
81M with CAD, COPD (3L Home O2 + BIPAP at night), DM2, HLD and BPH admitted for Hypercapnic Respiratory Failure.     Acute Hypercapnic Respiratory Failure / Acute Metabolic Encephalopathy / COPD  Hypercapnia related to underlying COPD; s/p bipap use, clinically improved  Monitor blood gas and for clinical improvement   Nebulizers + po steroids + po antibiotics   Symbicort  COPD with baseline 3L at home prn (mostly nocturnal nasal cannula use) along with BIPAP PRN   Management as per Pulmonary Critical Care (Dr. Dejesus)    CAD  History of CABG and PCI x 3   Aspirin + Losartan  Last Echocardiogram noted; Consider repeat       DM2  ISS and FS per protocol and maintain BS < 180  Last 24 hours of POC Glucose checks:   POCT Blood Glucose.: 368 mg/dL (05-18-21 @ 12:20)  POCT Blood Glucose.: 194 mg/dL (05-18-21 @ 05:29)  POCT Blood Glucose.: 263 mg/dL (05-17-21 @ 23:16)  POCT Blood Glucose.: 184 mg/dL (05-17-21 @ 19:17)  A1C with Estimated Average Glucose Result: 7.1 % (05-18-21 @ 08:37)      BPH  Flomax    Diet  dash, carb controlled    DVT Prophylaxis  SCD    Disposition  Full Code/Inpatient  Currently in ICU for critical care management   Dispo pending, expect d/c home in 24-48hrs

## 2021-05-18 NOTE — DISCHARGE NOTE PROVIDER - CARE PROVIDER_API CALL
Sarah Avila)  Internal Medicine  [address] [place] [address]  Phone: [phone]  Fax: [phone]  Follow Up Time:

## 2021-05-18 NOTE — DISCHARGE NOTE PROVIDER - NSDCCPCAREPLAN_GEN_ALL_CORE_FT
PRINCIPAL DISCHARGE DIAGNOSIS  Diagnosis: Acute hypercapnic respiratory failure  Assessment and Plan of Treatment: - You were admitted to the ICU with respiratory failure likely secondary to COPD exacerbation vs. seasonal allergies  - You required supplemental oxygen (BIPAP) and your respiratory status improved with nebulizers, steroids, and antibiotics  - Please follow up with your PCP (Dr. Avila) and pulmonologist within 1 week of discharge      SECONDARY DISCHARGE DIAGNOSES  Diagnosis: Diabetes  Assessment and Plan of Treatment: - You were previously diagnosed with diabetes  - Please continue home medications    Diagnosis: Chronic obstructive pulmonary disease (COPD)  Assessment and Plan of Treatment: - You were previously diagnosed with COPD  - Please continue home oxygen and medications    Diagnosis: BPH (benign prostatic hyperplasia)  Assessment and Plan of Treatment: - You were previously diagnosed with BPH  - Please continue home medication Flomax    Diagnosis: CAD (coronary artery disease)  Assessment and Plan of Treatment: - You have a history of CABG and cardiac stents  - Please continue home medications - Aspirin and Atorvastatin    Diagnosis: Hypertension  Assessment and Plan of Treatment: - You were previously diagnosed with hypertension  - Please continue home medication Losartan and Lopressor     PRINCIPAL DISCHARGE DIAGNOSIS  Diagnosis: Acute hypercapnic respiratory failure  Assessment and Plan of Treatment: - You were admitted to the ICU with respiratory failure likely secondary to COPD exacerbation vs. seasonal allergies  - You required supplemental oxygen (BIPAP) and your respiratory status improved with nebulizers, steroids, and antibiotics  - START Medrol dose pack, follow instructions on prescription sent to your pharmacy  - Please follow up with your PCP (Dr. Avila) and pulmonologist within 1 week of discharge      SECONDARY DISCHARGE DIAGNOSES  Diagnosis: Diabetes  Assessment and Plan of Treatment: - You were previously diagnosed with diabetes  - Please continue home medications    Diagnosis: Chronic obstructive pulmonary disease (COPD)  Assessment and Plan of Treatment: - You were previously diagnosed with COPD  - Please continue home oxygen and medications    Diagnosis: BPH (benign prostatic hyperplasia)  Assessment and Plan of Treatment: - You were previously diagnosed with BPH  - Please continue home medication Flomax    Diagnosis: CAD (coronary artery disease)  Assessment and Plan of Treatment: - You have a history of CABG and cardiac stents  - Please continue home medications - Aspirin and Atorvastatin    Diagnosis: Hypertension  Assessment and Plan of Treatment: - You were previously diagnosed with hypertension  - Please continue home medication Losartan and Lopressor

## 2021-05-18 NOTE — PROGRESS NOTE ADULT - SUBJECTIVE AND OBJECTIVE BOX
INCOMPLETE NOTE - CHARTING IN PROGRESS    Patient is a 81y old  Male who presents with a chief complaint of Hypercapnic Respiratory Failure (17 May 2021 15:11)    Interval Events: Pt seen and examined at bedside, not offering any complaints. Improved shortness of breath. Resting comfortably.       Review of Systems:  CONSTITUTIONAL: No fever, chills, or fatigue  EYES: No eye pain, visual disturbances, or discharge  ENMT:  No difficulty hearing, tinnitus, vertigo; No sinus or throat pain  NECK: No pain or stiffness  RESPIRATORY: No cough, wheezing, chills or hemoptysis; No shortness of breath  CARDIOVASCULAR: No chest pain, palpitations, dizziness, or leg swelling  GASTROINTESTINAL: No abdominal or epigastric pain. No nausea, vomiting, or hematemesis; No diarrhea or constipation. No melena or hematochezia.  GENITOURINARY: No dysuria, frequency, hematuria, or incontinence  NEUROLOGICAL: No headaches, memory loss, loss of strength, numbness, or tremors  SKIN: No itching, burning, rashes, or lesions   MUSCULOSKELETAL: No joint pain or swelling; No muscle, back, or extremity pain  PSYCHIATRIC: No depression, anxiety, mood swings, or difficulty sleeping      Medications:  levoFLOXacin IVPB      levoFLOXacin IVPB 500 milliGRAM(s) IV Intermittent every 24 hours    losartan 25 milliGRAM(s) Oral daily  metoprolol tartrate 12.5 milliGRAM(s) Oral every 12 hours  tamsulosin 0.4 milliGRAM(s) Oral at bedtime    albuterol/ipratropium for Nebulization 3 milliLiter(s) Nebulizer every 6 hours  montelukast 10 milliGRAM(s) Oral at bedtime        aspirin enteric coated 81 milliGRAM(s) Oral daily  clopidogrel Tablet 75 milliGRAM(s) Oral daily  enoxaparin Injectable 40 milliGRAM(s) SubCutaneous daily    pantoprazole  Injectable 40 milliGRAM(s) IV Push daily      atorvastatin 80 milliGRAM(s) Oral at bedtime  dextrose 40% Gel 15 Gram(s) Oral once  dextrose 50% Injectable 25 Gram(s) IV Push once  dextrose 50% Injectable 12.5 Gram(s) IV Push once  dextrose 50% Injectable 25 Gram(s) IV Push once  glucagon  Injectable 1 milliGRAM(s) IntraMuscular once  insulin lispro (ADMELOG) corrective regimen sliding scale   SubCutaneous every 6 hours  methylPREDNISolone sodium succinate Injectable 60 milliGRAM(s) IV Push two times a day    dextrose 5%. 1000 milliLiter(s) IV Continuous <Continuous>  dextrose 5%. 1000 milliLiter(s) IV Continuous <Continuous>      artificial  tears Solution 1 Drop(s) Both EYES every 12 hours        ICU Vital Signs Last 24 Hrs  T(C): 36.6 (18 May 2021 07:46), Max: 37.7 (18 May 2021 00:38)  T(F): 97.9 (18 May 2021 07:46), Max: 99.8 (18 May 2021 00:38)  HR: 78 (18 May 2021 07:00) (71 - 119)  BP: 106/60 (18 May 2021 07:00) (104/56 - 168/105)  BP(mean): 76 (18 May 2021 07:00) (75 - 93)  ABP: --  ABP(mean): --  RR: 18 (18 May 2021 07:00) (15 - 41)  SpO2: 99% (18 May 2021 07:00) (94% - 100%)      I&O's Detail    17 May 2021 07:01  -  18 May 2021 07:00  --------------------------------------------------------  IN:  Total IN: 0 mL    OUT:    Voided (mL): 950 mL  Total OUT: 950 mL    Total NET: -950 mL                            14.1   6.49  )-----------( 246      ( 18 May 2021 05:17 )             46.0       05-18    142  |  103  |  30<H>  ----------------------------<  174<H>  4.7   |  29  |  1.70<H>    Ca    9.9      18 May 2021 05:17  Phos  3.6     05-18  Mg     2.4     05-18    TPro  7.7  /  Alb  3.8  /  TBili  0.4  /  DBili  x   /  AST  9<L>  /  ALT  19  /  AlkPhos  95  05-18          ABG - ( 17 May 2021 11:42 )  pH, Arterial: 7.18  pH, Blood: x     /  pCO2: 80    /  pO2: 84    / HCO3: 22    / Base Excess: 1.0   /  SaO2: 94              PE:  General: Elderly male in NAD, resting comfortably  HEENT: Pupils equal, reactive to light.  Symmetric.  PULM: b/l air entry, clear breath sounds bilaterally  CVS: +s1/s2  ABD: Soft, nondistended, nontender, normoactive bowel sounds, no masses  EXT: No edema, nontender  SKIN: Warm and well perfused  Neuro: alert, oriented x3, moves all extremities    RADIOLOGY:   < from: Xray Chest 1 View- PORTABLE-Urgent (Xray Chest 1 View- PORTABLE-Urgent .) (05.17.21 @ 15:00) >  INTERPRETATION:  CLINICAL STATEMENT: Postthoracocentesis    TECHNIQUE: AP view of the chest.    COMPARISON: 5/17/2021 at 10:46 AM    FINDINGS/  IMPRESSION:  Sternotomy wires again noted. No pneumothorax. No consolidation. Right CP angle excluded. No significant pleural effusion    Heart size within normal limits.         Patient is a 81y old  Male who presents with a chief complaint of Hypercapnic Respiratory Failure (17 May 2021 15:11)    Interval Events: Pt seen and examined at bedside, not offering any complaints. Improved shortness of breath. Resting comfortably. Requesting discharge.       Review of Systems:  CONSTITUTIONAL: No fever, chills, or fatigue  EYES: No eye pain, visual disturbances, or discharge  ENMT:  No difficulty hearing, tinnitus, vertigo; No sinus or throat pain  NECK: No pain or stiffness  RESPIRATORY: No cough, wheezing, chills or hemoptysis; No shortness of breath  CARDIOVASCULAR: No chest pain, palpitations, dizziness, or leg swelling  GASTROINTESTINAL: No abdominal or epigastric pain. No nausea, vomiting, or hematemesis; No diarrhea or constipation. No melena or hematochezia.  GENITOURINARY: No dysuria, frequency, hematuria, or incontinence  NEUROLOGICAL: No headaches, memory loss, loss of strength, numbness, or tremors  SKIN: No itching, burning, rashes, or lesions   MUSCULOSKELETAL: No joint pain or swelling; No muscle, back, or extremity pain  PSYCHIATRIC: No depression, anxiety, mood swings, or difficulty sleeping      Medications:  levoFLOXacin IVPB      levoFLOXacin IVPB 500 milliGRAM(s) IV Intermittent every 24 hours    losartan 25 milliGRAM(s) Oral daily  metoprolol tartrate 12.5 milliGRAM(s) Oral every 12 hours  tamsulosin 0.4 milliGRAM(s) Oral at bedtime    albuterol/ipratropium for Nebulization 3 milliLiter(s) Nebulizer every 6 hours  montelukast 10 milliGRAM(s) Oral at bedtime        aspirin enteric coated 81 milliGRAM(s) Oral daily  clopidogrel Tablet 75 milliGRAM(s) Oral daily  enoxaparin Injectable 40 milliGRAM(s) SubCutaneous daily    pantoprazole  Injectable 40 milliGRAM(s) IV Push daily      atorvastatin 80 milliGRAM(s) Oral at bedtime  dextrose 40% Gel 15 Gram(s) Oral once  dextrose 50% Injectable 25 Gram(s) IV Push once  dextrose 50% Injectable 12.5 Gram(s) IV Push once  dextrose 50% Injectable 25 Gram(s) IV Push once  glucagon  Injectable 1 milliGRAM(s) IntraMuscular once  insulin lispro (ADMELOG) corrective regimen sliding scale   SubCutaneous every 6 hours  methylPREDNISolone sodium succinate Injectable 60 milliGRAM(s) IV Push two times a day    dextrose 5%. 1000 milliLiter(s) IV Continuous <Continuous>  dextrose 5%. 1000 milliLiter(s) IV Continuous <Continuous>      artificial  tears Solution 1 Drop(s) Both EYES every 12 hours        ICU Vital Signs Last 24 Hrs  T(C): 36.6 (18 May 2021 07:46), Max: 37.7 (18 May 2021 00:38)  T(F): 97.9 (18 May 2021 07:46), Max: 99.8 (18 May 2021 00:38)  HR: 78 (18 May 2021 07:00) (71 - 119)  BP: 106/60 (18 May 2021 07:00) (104/56 - 168/105)  BP(mean): 76 (18 May 2021 07:00) (75 - 93)  ABP: --  ABP(mean): --  RR: 18 (18 May 2021 07:00) (15 - 41)  SpO2: 99% (18 May 2021 07:00) (94% - 100%)      I&O's Detail    17 May 2021 07:01  -  18 May 2021 07:00  --------------------------------------------------------  IN:  Total IN: 0 mL    OUT:    Voided (mL): 950 mL  Total OUT: 950 mL    Total NET: -950 mL                            14.1   6.49  )-----------( 246      ( 18 May 2021 05:17 )             46.0       05-18    142  |  103  |  30<H>  ----------------------------<  174<H>  4.7   |  29  |  1.70<H>    Ca    9.9      18 May 2021 05:17  Phos  3.6     05-18  Mg     2.4     05-18    TPro  7.7  /  Alb  3.8  /  TBili  0.4  /  DBili  x   /  AST  9<L>  /  ALT  19  /  AlkPhos  95  05-18          ABG - ( 17 May 2021 11:42 )  pH, Arterial: 7.18  pH, Blood: x     /  pCO2: 80    /  pO2: 84    / HCO3: 22    / Base Excess: 1.0   /  SaO2: 94              PE:  General: Elderly male in NAD, resting comfortably  HEENT: Pupils equal, reactive to light.  Symmetric.  PULM: b/l air entry, clear breath sounds bilaterally  CVS: +s1/s2  ABD: Soft, nondistended, nontender, normoactive bowel sounds, no masses  EXT: No edema, nontender  SKIN: Warm and well perfused  Neuro: alert, oriented x3, moves all extremities    RADIOLOGY:   < from: Xray Chest 1 View- PORTABLE-Urgent (Xray Chest 1 View- PORTABLE-Urgent .) (05.17.21 @ 15:00) >  INTERPRETATION:  CLINICAL STATEMENT: Postthoracocentesis    TECHNIQUE: AP view of the chest.    COMPARISON: 5/17/2021 at 10:46 AM    FINDINGS/  IMPRESSION:  Sternotomy wires again noted. No pneumothorax. No consolidation. Right CP angle excluded. No significant pleural effusion    Heart size within normal limits.

## 2021-05-18 NOTE — DISCHARGE NOTE PROVIDER - NSDCFUSCHEDAPPT_GEN_ALL_CORE_FT
YUE COTTO ; 06/14/2021 ; [facility] [address] ; 06/14/2021 ; [facility] [address] ; 06/22/2021 ; NPP Med Pul[address]

## 2021-05-18 NOTE — DISCHARGE NOTE PROVIDER - HOSPITAL COURSE
ADMISSION H+P:    HPI:  81M with CAD, COPD (3L Home O2 + BIPAP at night), DM2 presents to the ED today for increasing confusion and lethargy along with SOB. Most of the history was obtained from the chart and his spouse, as the patient himself was very lethargic to give a meaningful history. Patient is well known to us due to multiple admissions in the past for Hypercapnic Respiratory Failure.  He has required intubation in the past and his last admission was in October 2020, also for hypercapnia.     Today in the ED the patient was found to be somnolent, routine labs and imaging shows patient with a pH of 7.16 and pCO2 of 86.  He was placed on BIPAP, given nebulizers, steroids and antibiotics. CXR was negative for consolidation, infiltrates or fluid. Spouse reports recent completion of COVID vaccination series and denies any recent travel, sick contacts, fevers or chill at home. Patients repeat 1 hour blood gas with minimal improvement and as been admitted to our ICU for further management.  (17 May 2021 15:11)      ---  HOSPITAL COURSE: Patient was admitted to ICU for acute hypoxic and hypercapnic respiratory failure and acute metabolic encephalopathy likely 2/2 COPD exacerbation and possible pneumonia. Patient required BIPAP and eventually was transitioned to NC during hospital course, as respiratory status improved. Patient was treated with nebulizers and steroids for COPD exacerbation. Although patient had no fever or radiographic infiltrate on imaging, patient was treated with short course of IV Levaquin to cover for possible pneumonia.     Patient was medically optimized and improved clinically throughout hospital course. Patient seen and examined on day of discharge.    Vital Signs      Physical Exam:      Patient is medically stable for discharge to ____ with outpatient follow up.    ---  CONSULTANTS:   Pulmonary: Dr. Dejesus    ---  TIME SPENT:   I, the attending physician, was physically present for the key portions of the evaluation and management (E/M) service provided. The total amount of time spent reviewing the hospital course, laboratory values, imaging findings, assessing/counseling the patient, discussing with consultant physicians, social work, nursing staff was -- minutes.     ---  FINAL DISCHARGE DIAGNOSIS LIST:  Please see last daily progress note for final discharge diagnoses    ---  Primary care provider was made aware of plan for discharge:  [    ] NO     [     ] YES       ADMISSION H+P:    HPI:  81M with CAD, COPD (3L Home O2 + BIPAP at night), DM2 presents to the ED today for increasing confusion and lethargy along with SOB. Most of the history was obtained from the chart and his spouse, as the patient himself was very lethargic to give a meaningful history. Patient is well known to us due to multiple admissions in the past for Hypercapnic Respiratory Failure.  He has required intubation in the past and his last admission was in October 2020, also for hypercapnia.     Today in the ED the patient was found to be somnolent, routine labs and imaging shows patient with a pH of 7.16 and pCO2 of 86.  He was placed on BIPAP, given nebulizers, steroids and antibiotics. CXR was negative for consolidation, infiltrates or fluid. Spouse reports recent completion of COVID vaccination series and denies any recent travel, sick contacts, fevers or chill at home. Patients repeat 1 hour blood gas with minimal improvement and as been admitted to our ICU for further management.  (17 May 2021 15:11)      ---  HOSPITAL COURSE: Patient was admitted to ICU for acute hypoxic and hypercapnic respiratory failure and acute metabolic encephalopathy likely 2/2 COPD exacerbation and possible pneumonia. Patient required BIPAP and eventually was transitioned to NC during hospital course, as respiratory status improved. Patient was treated with nebulizers and steroids for COPD exacerbation. Although patient had no fever or radiographic infiltrate on imaging, patient was treated with short course of Levaquin to cover for possible pneumonia.     Patient was medically optimized and improved clinically throughout hospital course. Patient seen and examined on day of discharge.    T(C): 36.4 (05-19-21 @ 03:40), Max: 37 (05-18-21 @ 16:27)  HR: 73 (05-19-21 @ 06:00) (70 - 112)  BP: 113/71 (05-19-21 @ 05:00) (91/65 - 143/73)  RR: 19 (05-19-21 @ 06:00) (14 - 43)  SpO2: 100% (05-19-21 @ 06:00) (94% - 100%)    GENERAL: patient appears well, no acute distress, appropriate, pleasant  EYES: sclera clear, no exudates  ENMT: oropharynx clear without erythema, no exudates, moist mucous membranes  NECK: supple, soft  LUNGS: good air entry bilaterally, clear to auscultation, symmetric breath sounds, no wheezing or rhonchi appreciated  HEART: soft S1/S2, regular rate and rhythm, no murmurs noted  GASTROINTESTINAL: abdomen is soft, nontender, nondistended, normoactive bowel sounds  INTEGUMENT: warm, dry  MUSCULOSKELETAL: no clubbing or cyanosis, no obvious deformity  NEUROLOGIC: awake, alert, oriented x3, good muscle tone in 4 extremities, no obvious sensory deficits  PSYCHIATRIC: mood is good, affect is congruent, linear and logical thought process  HEME/LYMPH: no palpable supraclavicular nodules, no obvious ecchymosis or petechiae     Patient is medically stable for discharge to home with outpatient follow up.    ---  CONSULTANTS:   Pulmonary: Dr. Dejesus    ---  TIME SPENT:   I, the attending physician, was physically present for the key portions of the evaluation and management (E/M) service provided. The total amount of time spent reviewing the hospital course, laboratory values, imaging findings, assessing/counseling the patient, discussing with consultant physicians, social work, nursing staff was -- minutes.     ---  FINAL DISCHARGE DIAGNOSIS LIST:  Please see last daily progress note for final discharge diagnoses         ADMISSION H+P:    HPI:  81M with CAD, COPD (3L Home O2 + BIPAP at night), DM2 presents to the ED today for increasing confusion and lethargy along with SOB. Most of the history was obtained from the chart and his spouse, as the patient himself was very lethargic to give a meaningful history. Patient is well known to us due to multiple admissions in the past for Hypercapnic Respiratory Failure.  He has required intubation in the past and his last admission was in October 2020, also for hypercapnia.     Today in the ED the patient was found to be somnolent, routine labs and imaging shows patient with a pH of 7.16 and pCO2 of 86.  He was placed on BIPAP, given nebulizers, steroids and antibiotics. CXR was negative for consolidation, infiltrates or fluid. Spouse reports recent completion of COVID vaccination series and denies any recent travel, sick contacts, fevers or chill at home. Patients repeat 1 hour blood gas with minimal improvement and as been admitted to our ICU for further management.  (17 May 2021 15:11)      ---  HOSPITAL COURSE: Patient was admitted to ICU for acute hypoxic and hypercapnic respiratory failure and acute metabolic encephalopathy likely 2/2 COPD exacerbation and possible pneumonia. Patient required BIPAP and eventually was transitioned to NC during hospital course, as respiratory status improved. Patient was treated with nebulizers and steroids for COPD exacerbation. Although patient had no fever or radiographic infiltrate on imaging, patient was treated with short course of Levaquin to cover for possible pneumonia.     Patient was medically optimized and improved clinically throughout hospital course. Patient seen and examined on day of discharge.    T(C): 36.4 (05-19-21 @ 03:40), Max: 37 (05-18-21 @ 16:27)  HR: 73 (05-19-21 @ 06:00) (70 - 112)  BP: 113/71 (05-19-21 @ 05:00) (91/65 - 143/73)  RR: 19 (05-19-21 @ 06:00) (14 - 43)  SpO2: 100% (05-19-21 @ 06:00) (94% - 100%)    GENERAL: patient appears well, no acute distress, appropriate, pleasant  EYES: sclera clear, no exudates  ENMT: oropharynx clear without erythema, no exudates, moist mucous membranes  NECK: supple, soft  LUNGS: good air entry bilaterally, clear to auscultation, symmetric breath sounds, no wheezing or rhonchi appreciated  HEART: soft S1/S2, regular rate and rhythm, no murmurs noted  GASTROINTESTINAL: abdomen is soft, nontender, nondistended, normoactive bowel sounds  INTEGUMENT: warm, dry  MUSCULOSKELETAL: no clubbing or cyanosis, no obvious deformity  NEUROLOGIC: awake, alert, oriented x3, good muscle tone in 4 extremities, no obvious sensory deficits  PSYCHIATRIC: mood is good, affect is congruent, linear and logical thought process  HEME/LYMPH: no palpable supraclavicular nodules, no obvious ecchymosis or petechiae     Patient is medically stable for discharge to home with outpatient follow up.    ---  CONSULTANTS:   Pulmonary: Dr. Dejesus    ---  TIME SPENT:   I, the attending physician, was physically present for the key portions of the evaluation and management (E/M) service provided. The total amount of time spent reviewing the hospital course, laboratory values, imaging findings, assessing/counseling the patient, discussing with consultant physicians, social work, nursing staff was 75 minutes.     ---  FINAL DISCHARGE DIAGNOSIS LIST:  Please see last daily progress note for final discharge diagnoses

## 2021-05-18 NOTE — PROGRESS NOTE ADULT - SUBJECTIVE AND OBJECTIVE BOX
YUE COTTO    [facility] ICU1 19A    Patient is a 81y old  Male who presents with a chief complaint of Hypercapnic Respiratory Failure (18 May 2021 10:11)       Allergies    No Known Allergies    Intolerances    shellfish (Nausea)      HPI:  81M with CAD, COPD (3L Home O2 + BIPAP at night), DM2 presents to the ED today for increasing confusion and lethargy along with SOB. Most of the history was obtained from the chart and his spouse, as the patient himself was very lethargic to give a meaningful history. Patient is well known to us due to multiple admissions in the past for Hypercapnic Respiratory Failure.  He has required intubation in the past and his last admission was in 2020, also for hypercapnia.     Today in the ED the patient was found to be somnolent, routine labs and imaging shows patient with a pH of 7.16 and pCO2 of 86.  He was placed on BIPAP, given nebulizers, steroids and antibiotics. CXR was negative for consolidation, infiltrates or fluid. Spouse reports recent completion of COVID vaccination series and denies any recent travel, sick contacts, fevers or chill at home. Patients repeat 1 hour blood gas with minimal improvement and as been admitted to our ICU for further management.  (17 May 2021 15:11)      PAST MEDICAL & SURGICAL HISTORY:  Diabetes Mellitus, Type II    CAD (Coronary Artery Disease)  s/p 3v CABG ; stents placed in [place] in     Dyslipidemia    Osteomyelitis    COPD (chronic obstructive pulmonary disease)  on 2L at home and BiPAP at night; intubated     Hypertension    PVD (peripheral vascular disease)    CABG (Coronary Artery Bypass Graft)      Compound fracture  left leg    S/P primary angioplasty with coronary stent        FAMILY HISTORY:  Family history of diabetes mellitus (Sibling)    Family hx of lung cancer  brother,  age 82, used to smoke with pt          MEDICATIONS   ALBUTerol    90 MICROgram(s) HFA Inhaler 2 Puff(s) Inhalation every 6 hours PRN  albuterol/ipratropium for Nebulization 3 milliLiter(s) Nebulizer every 6 hours  artificial  tears Solution 1 Drop(s) Both EYES every 12 hours  aspirin enteric coated 81 milliGRAM(s) Oral daily  atorvastatin 80 milliGRAM(s) Oral at bedtime  budesonide 160 MICROgram(s)/formoterol 4.5 MICROgram(s) Inhaler 2 Puff(s) Inhalation two times a day  clopidogrel Tablet 75 milliGRAM(s) Oral daily  dextrose 40% Gel 15 Gram(s) Oral once  dextrose 5%. 1000 milliLiter(s) IV Continuous <Continuous>  dextrose 5%. 1000 milliLiter(s) IV Continuous <Continuous>  dextrose 50% Injectable 25 Gram(s) IV Push once  dextrose 50% Injectable 12.5 Gram(s) IV Push once  dextrose 50% Injectable 25 Gram(s) IV Push once  enoxaparin Injectable 40 milliGRAM(s) SubCutaneous daily  glucagon  Injectable 1 milliGRAM(s) IntraMuscular once  insulin lispro (ADMELOG) corrective regimen sliding scale   SubCutaneous every 6 hours  levoFLOXacin  Tablet 500 milliGRAM(s) Oral every 24 hours  losartan 25 milliGRAM(s) Oral daily  metoprolol tartrate 12.5 milliGRAM(s) Oral every 12 hours  montelukast 10 milliGRAM(s) Oral at bedtime  pantoprazole  Injectable 40 milliGRAM(s) IV Push daily  predniSONE   Tablet 40 milliGRAM(s) Oral daily  tamsulosin 0.4 milliGRAM(s) Oral at bedtime  tiotropium 18 MICROgram(s) Capsule 1 Capsule(s) Inhalation daily      Vital Signs Last 24 Hrs  T(C): 36.6 (18 May 2021 07:46), Max: 37.7 (18 May 2021 00:38)  T(F): 97.9 (18 May 2021 07:46), Max: 99.8 (18 May 2021 00:38)  HR: 86 (18 May 2021 08:51) (71 - 119)  BP: 106/60 (18 May 2021 07:00) (104/56 - 153/86)  BP(mean): 76 (18 May 2021 07:00) (75 - 93)  RR: 18 (18 May 2021 07:00) (15 - 41)  SpO2: 98% (18 May 2021 08:51) (94% - 100%)      21 @ 07:01  -  21 @ 07:00  --------------------------------------------------------  IN: 0 mL / OUT: 950 mL / NET: -950 mL            LABS:                        14.1   6.49  )-----------( 246      ( 18 May 2021 05:17 )             46.0         142  |  103  |  30<H>  ----------------------------<  174<H>  4.7   |  29  |  1.70<H>    Ca    9.9      18 May 2021 05:17  Phos  3.6       Mg     2.4         TPro  7.7  /  Alb  3.8  /  TBili  0.4  /  DBili  x   /  AST  9<L>  /  ALT  19  /  AlkPhos  95            ABG - ( 17 May 2021 11:42 )  pH, Arterial: 7.18  pH, Blood: x     /  pCO2: 80    /  pO2: 84    / HCO3: 22    / Base Excess: 1.0   /  SaO2: 94                  WBC:  WBC Count: 6.49 K/uL ( @ 05:17)  WBC Count: 14.36 K/uL ( @ 09:59)      MICROBIOLOGY:  RECENT CULTURES:        CARDIAC MARKERS ( 17 May 2021 10:40 )  <.015 ng/mL / x     / x     / x     / x                Sodium:  Sodium, Serum: 142 mmol/L ( @ 05:17)  Sodium, Serum: 139 mmol/L ( @ 10:40)      1.70 mg/dL  @ 05:17  1.50 mg/dL  @ 10:40      Hemoglobin:  Hemoglobin: 14.1 g/dL ( @ 05:17)  Hemoglobin: 15.4 g/dL ( @ 09:59)      Platelets: Platelet Count - Automated: 246 K/uL ( @ 05:17)  Platelet Count - Automated: 292 K/uL ( @ 09:59)      LIVER FUNCTIONS - ( 18 May 2021 05:17 )  Alb: 3.8 g/dL / Pro: 7.7 g/dL / ALK PHOS: 95 U/L / ALT: 19 U/L / AST: 9 U/L / GGT: x                 RADIOLOGY & ADDITIONAL STUDIES:

## 2021-05-18 NOTE — PROGRESS NOTE ADULT - SUBJECTIVE AND OBJECTIVE BOX
Patient is a 81y old  Male who presents with a chief complaint of Hypercapnic Respiratory Failure (17 May 2021 15:11)      BRIEF HOSPITAL COURSE:   Patient is a 81 year old male with a pmhx of COPD (On home O2 3L and nocturnal bilevel), eosinophilic asthma, CAD (w/ 3v CABG ), s/p 2 recent coronary stents at [place], PVD s/p LLE stent (2019), HLD, DM2 on metformin BPH, hx Hypercapnic Respiratory Failure/Cardiac Arrest requiring intubation (), multiple frequent admissions for COPD exacerbations who presents with shortness of breath for the past day. Per EMR patient was noticed to be more lethargic. Wife noticed his mental status change and called 911. Over the past week patient has attempted to taken more nebs to facilitate his breathing.  Upon arrival patient found to be hypercapnic on abg. He was started on abx and nippv. Pt endorses that flowers for mothers day is causing his breathing issues.     Pt seen and examined at bedside, not offering any complaints     Events last 24 hours:   - remains dependent of bilevel to maintain ventilation  - tolerated ~1hr off then became more lethargic       PAST MEDICAL & SURGICAL HISTORY:  Diabetes Mellitus, Type II    CAD (Coronary Artery Disease)  s/p 3v CABG ; stents placed in [place] in     Dyslipidemia    Osteomyelitis    COPD (chronic obstructive pulmonary disease)  on 2L at home and BiPAP at night; intubated     Hypertension    PVD (peripheral vascular disease)    CABG (Coronary Artery Bypass Graft)      Compound fracture  left leg    S/P primary angioplasty with coronary stent      Allergies    No Known Allergies    Intolerances    shellfish (Nausea)    FAMILY HISTORY:  Family history of diabetes mellitus (Sibling)    Family hx of lung cancer  brother,  age 82, used to smoke with pt        Review of Systems:  CONSTITUTIONAL: No fever, chills, or fatigue  EYES: No eye pain, visual disturbances, or discharge  ENMT:  No difficulty hearing, tinnitus, vertigo; No sinus or throat pain  NECK: No pain or stiffness  RESPIRATORY: No cough, wheezing, chills or hemoptysis; No shortness of breath  CARDIOVASCULAR: No chest pain, palpitations, dizziness, or leg swelling  GASTROINTESTINAL: No abdominal or epigastric pain. No nausea, vomiting, or hematemesis; No diarrhea or constipation. No melena or hematochezia.  GENITOURINARY: No dysuria, frequency, hematuria, or incontinence  NEUROLOGICAL: No headaches, memory loss, loss of strength, numbness, or tremors  SKIN: No itching, burning, rashes, or lesions   MUSCULOSKELETAL: No joint pain or swelling; No muscle, back, or extremity pain  PSYCHIATRIC: No depression, anxiety, mood swings, or difficulty sleeping      Medications:  levoFLOXacin IVPB      levoFLOXacin IVPB 500 milliGRAM(s) IV Intermittent every 24 hours    losartan 25 milliGRAM(s) Oral daily  metoprolol tartrate 12.5 milliGRAM(s) Oral every 12 hours  tamsulosin 0.4 milliGRAM(s) Oral at bedtime    albuterol/ipratropium for Nebulization 3 milliLiter(s) Nebulizer every 6 hours  montelukast 10 milliGRAM(s) Oral at bedtime        aspirin enteric coated 81 milliGRAM(s) Oral daily  clopidogrel Tablet 75 milliGRAM(s) Oral daily  enoxaparin Injectable 40 milliGRAM(s) SubCutaneous daily    pantoprazole  Injectable 40 milliGRAM(s) IV Push daily      atorvastatin 80 milliGRAM(s) Oral at bedtime  dextrose 40% Gel 15 Gram(s) Oral once  dextrose 50% Injectable 25 Gram(s) IV Push once  dextrose 50% Injectable 12.5 Gram(s) IV Push once  dextrose 50% Injectable 25 Gram(s) IV Push once  glucagon  Injectable 1 milliGRAM(s) IntraMuscular once  insulin lispro (ADMELOG) corrective regimen sliding scale   SubCutaneous every 6 hours  methylPREDNISolone sodium succinate Injectable 60 milliGRAM(s) IV Push two times a day    dextrose 5%. 1000 milliLiter(s) IV Continuous <Continuous>  dextrose 5%. 1000 milliLiter(s) IV Continuous <Continuous>      artificial  tears Solution 1 Drop(s) Both EYES every 12 hours            ICU Vital Signs Last 24 Hrs  T(C): 36.4 (17 May 2021 20:19), Max: 36.5 (17 May 2021 12:43)  T(F): 97.5 (17 May 2021 20:19), Max: 97.7 (17 May 2021 12:43)  HR: 83 (18 May 2021 00:00) (77 - 119)  BP: 122/74 (17 May 2021 22:00) (104/73 - 168/105)  BP(mean): 93 (17 May 2021 22:00) (81 - 93)  ABP: --  ABP(mean): --  RR: 23 (18 May 2021 00:00) (15 - 41)  SpO2: 95% (18 May 2021 00:00) (94% - 100%)    Vital Signs Last 24 Hrs  T(C): 36.4 (17 May 2021 20:19), Max: 36.5 (17 May 2021 12:43)  T(F): 97.5 (17 May 2021 20:19), Max: 97.7 (17 May 2021 12:43)  HR: 83 (18 May 2021 00:00) (77 - 119)  BP: 122/74 (17 May 2021 22:00) (104/73 - 168/105)  BP(mean): 93 (17 May 2021 22:00) (81 - 93)  RR: 23 (18 May 2021 00:00) (15 - 41)  SpO2: 95% (18 May 2021 00:00) (94% - 100%)    ABG - ( 17 May 2021 11:42 )  pH, Arterial: 7.18  pH, Blood: x     /  pCO2: 80    /  pO2: 84    / HCO3: 22    / Base Excess: 1.0   /  SaO2: 94                  I&O's Detail    17 May 2021 07:01  -  18 May 2021 00:21  --------------------------------------------------------  IN:  Total IN: 0 mL    OUT:    Voided (mL): 700 mL  Total OUT: 700 mL    Total NET: -700 mL            LABS:                        15.4   14.36 )-----------( 292      ( 17 May 2021 09:59 )             50.6     05-17    139  |  106  |  24<H>  ----------------------------<  178<H>  5.1   |  26  |  1.50<H>    Ca    9.9      17 May 2021 10:40    TPro  8.1  /  Alb  4.1  /  TBili  0.3  /  DBili  x   /  AST  18  /  ALT  19  /  AlkPhos  101  05-17      CARDIAC MARKERS ( 17 May 2021 10:40 )  <.015 ng/mL / x     / x     / x     / x          CAPILLARY BLOOD GLUCOSE      POCT Blood Glucose.: 263 mg/dL (17 May 2021 23:16)        CULTURES:      Physical Examination:    General: Elderly male in NAD     HEENT: Pupils equal, reactive to light.  Symmetric.    PULM: b/l air entry     CVS: +s1/s2    ABD: Soft, nondistended, nontender, normoactive bowel sounds, no masses    EXT: No edema, nontender    SKIN: Warm and well perfused    Neuro: alert, oriented x3, moves all extr    RADIOLOGY:   < from: Xray Chest 1 View- PORTABLE-Urgent (Xray Chest 1 View- PORTABLE-Urgent .) (21 @ 15:00) >  INTERPRETATION:  CLINICAL STATEMENT: Postthoracocentesis    TECHNIQUE: AP view of the chest.    COMPARISON: 2021 at 10:46 AM    FINDINGS/  IMPRESSION:  Sternotomy wires again noted. No pneumothorax. No consolidation. Right CP angle excluded. No significant pleural effusion    Heart size within normal limits.       Patient is a 81y old  Male who presents with a chief complaint of Hypercapnic Respiratory Failure (17 May 2021 15:11)      BRIEF HOSPITAL COURSE:   Patient is a 81 year old male with a pmhx of COPD (On home O2 3L and nocturnal bilevel), eosinophilic asthma, CAD (w/ 3v CABG ), s/p 2 recent coronary stents at [place], PVD s/p LLE stent (2019), HLD, DM2 on metformin BPH, hx Hypercapnic Respiratory Failure/Cardiac Arrest requiring intubation (), multiple frequent admissions for COPD exacerbations who presents with shortness of breath for the past day. Per EMR patient was noticed to be more lethargic. Wife noticed his mental status change and called 911. Over the past week patient has attempted to taken more nebs to facilitate his breathing.  Upon arrival patient found to be hypercapnic on abg. He was started on abx and nippv. Pt endorses that flowers for mothers day is causing his breathing issues.     Pt seen and examined at bedside, not offering any complaints     Events last 24 hours:   - remains dependent of bilevel to maintain ventilation  - tolerated ~1hr off then became more lethargic     Social hx:   former smoker  denies tobacco use     PAST MEDICAL & SURGICAL HISTORY:  Diabetes Mellitus, Type II    CAD (Coronary Artery Disease)  s/p 3v CABG ; stents placed in [place] in     Dyslipidemia    Osteomyelitis    COPD (chronic obstructive pulmonary disease)  on 2L at home and BiPAP at night; intubated     Hypertension    PVD (peripheral vascular disease)    CABG (Coronary Artery Bypass Graft)      Compound fracture  left leg    S/P primary angioplasty with coronary stent      Allergies    No Known Allergies    Intolerances    shellfish (Nausea)    FAMILY HISTORY:  Family history of diabetes mellitus (Sibling)    Family hx of lung cancer  brother,  age 82, used to smoke with pt        Review of Systems:  CONSTITUTIONAL: No fever, chills, or fatigue  EYES: No eye pain, visual disturbances, or discharge  ENMT:  No difficulty hearing, tinnitus, vertigo; No sinus or throat pain  NECK: No pain or stiffness  RESPIRATORY: No cough, wheezing, chills or hemoptysis; No shortness of breath  CARDIOVASCULAR: No chest pain, palpitations, dizziness, or leg swelling  GASTROINTESTINAL: No abdominal or epigastric pain. No nausea, vomiting, or hematemesis; No diarrhea or constipation. No melena or hematochezia.  GENITOURINARY: No dysuria, frequency, hematuria, or incontinence  NEUROLOGICAL: No headaches, memory loss, loss of strength, numbness, or tremors  SKIN: No itching, burning, rashes, or lesions   MUSCULOSKELETAL: No joint pain or swelling; No muscle, back, or extremity pain  PSYCHIATRIC: No depression, anxiety, mood swings, or difficulty sleeping      Medications:  levoFLOXacin IVPB      levoFLOXacin IVPB 500 milliGRAM(s) IV Intermittent every 24 hours    losartan 25 milliGRAM(s) Oral daily  metoprolol tartrate 12.5 milliGRAM(s) Oral every 12 hours  tamsulosin 0.4 milliGRAM(s) Oral at bedtime    albuterol/ipratropium for Nebulization 3 milliLiter(s) Nebulizer every 6 hours  montelukast 10 milliGRAM(s) Oral at bedtime        aspirin enteric coated 81 milliGRAM(s) Oral daily  clopidogrel Tablet 75 milliGRAM(s) Oral daily  enoxaparin Injectable 40 milliGRAM(s) SubCutaneous daily    pantoprazole  Injectable 40 milliGRAM(s) IV Push daily      atorvastatin 80 milliGRAM(s) Oral at bedtime  dextrose 40% Gel 15 Gram(s) Oral once  dextrose 50% Injectable 25 Gram(s) IV Push once  dextrose 50% Injectable 12.5 Gram(s) IV Push once  dextrose 50% Injectable 25 Gram(s) IV Push once  glucagon  Injectable 1 milliGRAM(s) IntraMuscular once  insulin lispro (ADMELOG) corrective regimen sliding scale   SubCutaneous every 6 hours  methylPREDNISolone sodium succinate Injectable 60 milliGRAM(s) IV Push two times a day    dextrose 5%. 1000 milliLiter(s) IV Continuous <Continuous>  dextrose 5%. 1000 milliLiter(s) IV Continuous <Continuous>      artificial  tears Solution 1 Drop(s) Both EYES every 12 hours            ICU Vital Signs Last 24 Hrs  T(C): 36.4 (17 May 2021 20:19), Max: 36.5 (17 May 2021 12:43)  T(F): 97.5 (17 May 2021 20:19), Max: 97.7 (17 May 2021 12:43)  HR: 83 (18 May 2021 00:00) (77 - 119)  BP: 122/74 (17 May 2021 22:00) (104/73 - 168/105)  BP(mean): 93 (17 May 2021 22:00) (81 - 93)  ABP: --  ABP(mean): --  RR: 23 (18 May 2021 00:00) (15 - 41)  SpO2: 95% (18 May 2021 00:00) (94% - 100%)    Vital Signs Last 24 Hrs  T(C): 36.4 (17 May 2021 20:19), Max: 36.5 (17 May 2021 12:43)  T(F): 97.5 (17 May 2021 20:19), Max: 97.7 (17 May 2021 12:43)  HR: 83 (18 May 2021 00:00) (77 - 119)  BP: 122/74 (17 May 2021 22:00) (104/73 - 168/105)  BP(mean): 93 (17 May 2021 22:00) (81 - 93)  RR: 23 (18 May 2021 00:00) (15 - 41)  SpO2: 95% (18 May 2021 00:00) (94% - 100%)    ABG - ( 17 May 2021 11:42 )  pH, Arterial: 7.18  pH, Blood: x     /  pCO2: 80    /  pO2: 84    / HCO3: 22    / Base Excess: 1.0   /  SaO2: 94                  I&O's Detail    17 May 2021 07:01  -  18 May 2021 00:21  --------------------------------------------------------  IN:  Total IN: 0 mL    OUT:    Voided (mL): 700 mL  Total OUT: 700 mL    Total NET: -700 mL            LABS:                        15.4   14.36 )-----------( 292      ( 17 May 2021 09:59 )             50.6     05-17    139  |  106  |  24<H>  ----------------------------<  178<H>  5.1   |  26  |  1.50<H>    Ca    9.9      17 May 2021 10:40    TPro  8.1  /  Alb  4.1  /  TBili  0.3  /  DBili  x   /  AST  18  /  ALT  19  /  AlkPhos  101  -      CARDIAC MARKERS ( 17 May 2021 10:40 )  <.015 ng/mL / x     / x     / x     / x          CAPILLARY BLOOD GLUCOSE      POCT Blood Glucose.: 263 mg/dL (17 May 2021 23:16)        CULTURES:      Physical Examination:    General: Elderly male in NAD     HEENT: Pupils equal, reactive to light.  Symmetric.    PULM: b/l air entry     CVS: +s1/s2    ABD: Soft, nondistended, nontender, normoactive bowel sounds, no masses    EXT: No edema, nontender    SKIN: Warm and well perfused    Neuro: alert, oriented x3, moves all extr    RADIOLOGY:   < from: Xray Chest 1 View- PORTABLE-Urgent (Xray Chest 1 View- PORTABLE-Urgent .) (21 @ 15:00) >  INTERPRETATION:  CLINICAL STATEMENT: Postthoracocentesis    TECHNIQUE: AP view of the chest.    COMPARISON: 2021 at 10:46 AM    FINDINGS/  IMPRESSION:  Sternotomy wires again noted. No pneumothorax. No consolidation. Right CP angle excluded. No significant pleural effusion    Heart size within normal limits.

## 2021-05-18 NOTE — DIETITIAN INITIAL EVALUATION ADULT. - ADD RECOMMEND
1) When medically feasible recommend to advance diet to Consistent CHO, Low Sodium, 2) Once diet is advanced encourage adequate PO intake, 3) Recommend MVI/daily, 4) Monitor pt's PO intake, weight, skin, edema, GI distress

## 2021-05-18 NOTE — DIETITIAN INITIAL EVALUATION ADULT. - OTHER INFO
As per chart pt is a 81 year old male with a PMH of CAD, COPD (3L Home O2 + BIPAP at night), DM2, HLD and BPH admitted for Hypercapnic Respiratory Failure.     Pt seen at bedside. Pt is currently NPO, was previously on BiPAP, currently on NC. Hx of DM on Metformin Jardiance PTA, current HgbA1c 7.1%, indicates good control for advanced age. Pt's admission weight 216.15lbs. Per previous RD note pt's weight (10/2020) 220lbs, indicates pt's weight has been fairly stable. Appears appropriately nourished for advanced age. No GI distress noted at this time, no BM since admission.     Stage 1 sacrum pressure injury As per chart pt is a 81 year old male with a PMH of CAD, COPD (3L Home O2 + BIPAP at night), DM2, HLD and BPH admitted for Hypercapnic Respiratory Failure.     Pt seen at bedside. Pt is currently NPO, was previously on BiPAP, currently on NC. Hx of DM on Metformin Jardiance PTA, current HgbA1c 7.1%, indicates good control for advanced age. Pt's admission weight 216.15lbs. Per previous RD note pt's weight (10/2020) 220lbs, indicates pt's weight has been fairly stable. Appears appropriately nourished for advanced age. No GI distress noted at this time, no BM since admission. Food allergy to shellfish.     Stage 1 sacrum pressure injury

## 2021-05-18 NOTE — PROGRESS NOTE ADULT - ASSESSMENT
Patient is a 81 year old male with a pmhx of COPD (On home O2 3L and nocturnal bilevel), eosinophilic asthma, CAD (w/ 3v CABG 2004), s/p 2 recent coronary stents at [place], PVD s/p LLE stent (11/2019), HLD, DM2 on metformin BPH, hx Hypercapnic Respiratory Failure/Cardiac Arrest requiring intubation (6/18), multiple frequent admissions for COPD exacerbations who presents with shortness of breath for the past day, found to have:    1. Acute hypoxic/hypercapnic resp failure   2. COPD exacerbation   3. Possible pna   4. AMS  5. ckd    Plan  Neuro: mental status improving with nippv. Avoid sedatives while recovering from hypercapnia.  Cardio: cont cozarr, lipitor, lopressor. Pt is HD stable   Pulm: Hypercapnic and hypoxic 2/2 COPD exacerbation from possible seasonal allergies vs pna. Most recent ABG is 7.18/80/84/22 but has been more awake. Pt remains on BIPAP 18/7 and fi02 is 21%. TVs ~400 and RR is mid to high 20s. Will continue on these settings overnight and attempt to liberate in the morning. Mental status has much improved while on bipap. Cont nebs q6  GI: npo except meds while needing nippv therapy. cont PPI  Renal: strict i/os, renally dose meds prn, cont flomax   ID: Levaquin for possible pna. wbc 14k.  No fever or radiographic infiltrate so anticipate short course. F/u cxs. Watch levaquin in renal failure   Heme: lovenox sq for chemical dvt ppx with SCDs. Cont aspirin / plavix  Endo: ISS q6. Cont solumedrol 60mg BID for COPD exacerbation     dispo: Remains in MICU Patient is a 81 year old male with a pmhx of COPD (On home O2 3L and nocturnal bilevel), eosinophilic asthma, CAD (w/ 3v CABG 2004), s/p 2 recent coronary stents at [place], PVD s/p LLE stent (11/2019), HLD, DM2 on metformin BPH, hx Hypercapnic Respiratory Failure/Cardiac Arrest requiring intubation (6/18), multiple frequent admissions for COPD exacerbations who presents with shortness of breath for the past day, found to have:    1. Acute hypoxic/hypercapnic resp failure   2. COPD exacerbation   3. Possible pna   4. AMS  5. ckd    Plan  Neuro: mental status improving with nippv. Avoid sedatives while recovering from hypercapnia.  Cardio: cont cozarr, lipitor, lopressor. Pt is HD stable   Pulm: Hypercapnic and hypoxic 2/2 COPD exacerbation from possible seasonal allergies vs pna. Most recent ABG is 7.18/80/84/22 but has been more awake. Pt remains on BIPAP 18/7 and fi02 is 21%. TVs ~400 and RR is mid to high 20s. Will continue on these settings overnight and attempt to liberate in the morning. Mental status has much improved while on bipap. Cont nebs q6. Titrate bipap to sp02 88-94 given COPD and avoid hyperoxia  GI: npo except meds while needing nippv therapy. cont PPI  Renal: strict i/os, renally dose meds prn, cont flomax   ID: Levaquin for possible pna. wbc 14k.  No fever or radiographic infiltrate so anticipate short course. F/u cxs. Watch levaquin in renal failure   Heme: lovenox sq for chemical dvt ppx with SCDs. Cont aspirin / plavix  Endo: ISS q6. Cont solumedrol 60mg BID for COPD exacerbation     dispo: Remains in MICU

## 2021-05-18 NOTE — PROGRESS NOTE ADULT - SUBJECTIVE AND OBJECTIVE BOX
Name: YUE COTTO  MRN: [id number]  LOCATION: [facility] ICU1 19A    ----  Patient is a 81y old  Male who presents with a chief complaint of Hypercapnic Respiratory Failure (18 May 2021 11:00)      FROM ADMISSION H+P:   HPI:  81M with CAD, COPD (3L Home O2 + BIPAP at night), DM2 presents to the ED today for increasing confusion and lethargy along with SOB. Most of the history was obtained from the chart and his spouse, as the patient himself was very lethargic to give a meaningful history. Patient is well known to us due to multiple admissions in the past for Hypercapnic Respiratory Failure.  He has required intubation in the past and his last admission was in 2020, also for hypercapnia.     ----  INTERVAL HPI/OVERNIGHT EVENTS: Pt seen and evaluated at the bedside. No acute overnight events occurred. Pt has no new complaints at this time. He was weaned off BiPAP this morning and is tolerating nasal cannula. He has no other new concerns at this time, reports feeling "much better" and "I'm ready to go home". Denies CP, palpitations. Denies nausea or vomiting He's tolerating diet well. He denies joint pains. Denies fever/chills. He states that he had been out of the hospital for about 8 months without issues and yesterday his son had flowers delivered. He thinks this was etiology of symptoms because he believes he was allergic.     ----  PAST MEDICAL & SURGICAL HISTORY:  Diabetes Mellitus, Type II    CAD (Coronary Artery Disease)  s/p 3v CABG ; stents placed in [place] in     Dyslipidemia    Osteomyelitis    COPD (chronic obstructive pulmonary disease)  on 2L at home and BiPAP at night; intubated     Hypertension    PVD (peripheral vascular disease)    CABG (Coronary Artery Bypass Graft)      Compound fracture  left leg    S/P primary angioplasty with coronary stent        FAMILY HISTORY:  Family history of diabetes mellitus (Sibling)    Family hx of lung cancer  brother,  age 82, used to smoke with pt        Allergies    No Known Allergies    Intolerances    shellfish (Nausea)      ----  ANTIMICROBIALS:  levoFLOXacin  Tablet 500 milliGRAM(s) Oral every 24 hours    CARDIOVASCULAR:  losartan 25 milliGRAM(s) Oral daily  metoprolol tartrate 12.5 milliGRAM(s) Oral every 12 hours  tamsulosin 0.4 milliGRAM(s) Oral at bedtime    GASTROINTESTINAL:  pantoprazole  Injectable 40 milliGRAM(s) IV Push daily    PULMONARY:  ALBUTerol    90 MICROgram(s) HFA Inhaler 2 Puff(s) Inhalation every 6 hours PRN  budesonide 160 MICROgram(s)/formoterol 4.5 MICROgram(s) Inhaler 2 Puff(s) Inhalation two times a day  montelukast 10 milliGRAM(s) Oral at bedtime  tiotropium 18 MICROgram(s) Capsule 1 Capsule(s) Inhalation daily      ----  REVIEW OF SYSTEMS:  comprehensive ROS as per HPI     ----  PHYSICAL EXAM:  GENERAL: patient appears comfortable, speaking in full sentences  ENMT: oropharynx clear without erythema, moist mucous membranes  LUNGS: coarse breath sounds b/l, no accessory muscle use  HEART: soft S1/S2, regular rate and rhythm, no murmurs noted, no noted edema to b/l LE  GASTROINTESTINAL: abdomen is soft, nontender, nondistended, normoactive bowel sounds, no palpable masses  MUSCULOSKELETAL: no clubbing or cyanosis, no obvious deformity  NEUROLOGIC: awake, alert, readily interactive, good muscle tone in 4 extremities    T(C): 37 (21 @ 16:27), Max: 37.7 (21 @ 00:38)  HR: 82 (21 @ 14:00) (71 - 114)  BP: 126/58 (21 @ 14:00) (104/56 - 143/73)  RR: 18 (21 @ 14:00) (16 - 43)  SpO2: 100% (21 @ 14:00) (94% - 100%)  Wt(kg): --    ----  INTAKE & OUTPUT:  I&O's Summary    17 May 2021 07:01  -  18 May 2021 07:00  --------------------------------------------------------  IN: 0 mL / OUT: 950 mL / NET: -950 mL        LABS:                        14.1   6.49  )-----------( 246      ( 18 May 2021 05:17 )             46.0     05-18    142  |  103  |  30<H>  ----------------------------<  174<H>  4.7   |  29  |  1.70<H>    Ca    9.9      18 May 2021 05:17  Phos  3.6       Mg     2.4         TPro  7.7  /  Alb  3.8  /  TBili  0.4  /  DBili  x   /  AST  9<L>  /  ALT  19  /  AlkPhos  95          CAPILLARY BLOOD GLUCOSE      POCT Blood Glucose.: 368 mg/dL (18 May 2021 12:20)  POCT Blood Glucose.: 194 mg/dL (18 May 2021 05:29)  POCT Blood Glucose.: 263 mg/dL (17 May 2021 23:16)  POCT Blood Glucose.: 184 mg/dL (17 May 2021 19:17)    ABG - ( 17 May 2021 11:42 )  pH, Arterial: 7.18  pH, Blood: x     /  pCO2: 80    /  pO2: 84    / HCO3: 22    / Base Excess: 1.0   /  SaO2: 94

## 2021-05-18 NOTE — PROGRESS NOTE ADULT - ASSESSMENT
INCOMPLETE NOTE - CHARTING IN PROGRESS    Patient is a 81 year old male with a pmhx of COPD (On home O2 3L and nocturnal bilevel), eosinophilic asthma, CAD (w/ 3v CABG 2004), s/p 2 recent coronary stents at [place], PVD s/p LLE stent (11/2019), HLD, DM2 on metformin BPH, hx Hypercapnic Respiratory Failure/Cardiac Arrest requiring intubation (6/18), multiple frequent admissions for COPD exacerbations who presents with shortness of breath for the past day, found to have:    1. Acute hypoxic/hypercapnic resp failure   2. COPD exacerbation   3. Possible pna   4. AMS  5. ckd    Plan    Neuro:   mental status improving with nippv. Avoid sedatives while recovering from hypercapnia.    Cardio:   cont cozarr, lipitor, lopressor. Pt is HD stable     Pulm:   Hypercapnic and hypoxic 2/2 COPD exacerbation from possible seasonal allergies vs pna. Most recent ABG is 7.18/80/84/22 but has been more awake. Pt remains on BIPAP 18/7 and fi02 is 21%. TVs ~400 and RR is mid to high 20s. Will continue on these settings overnight and attempt to liberate in the morning. Mental status has much improved while on bipap. Cont nebs q6. Titrate bipap to sp02 88-94 given COPD and avoid hyperoxia    GI:   npo except meds while needing nippv therapy. cont PPI    Renal:   strict i/os, renally dose meds prn, cont flomax     ID:   Levaquin for possible pna. wbc 14k.  No fever or radiographic infiltrate so anticipate short course. F/u cxs. Watch levaquin in renal failure     Heme:   lovenox sq for chemical dvt ppx with SCDs. Cont aspirin / plavix    Endo:   ISS q6. Cont solumedrol 60mg BID for COPD exacerbation     Dispo:   Remains in MICU   Patient is a 81 year old male with a pmhx of COPD (On home O2 3L and nocturnal bilevel), eosinophilic asthma, CAD (w/ 3v CABG 2004), s/p 2 recent coronary stents at [place], PVD s/p LLE stent (11/2019), HLD, DM2 on metformin BPH, hx Hypercapnic Respiratory Failure/Cardiac Arrest requiring intubation (6/18), multiple frequent admissions for COPD exacerbations who presents with shortness of breath for the past day, found to have:    1. Acute hypoxic/hypercapnic resp failure   2. COPD exacerbation   3. Possible pna   4. AMS  5. ckd    Plan    Neuro:   Mental status improved.   Avoid sedatives while recovering from hypercapnia.  OOB/WBAT/PT    Cardio:   Continue Cozar, Lipitor, Lopressor. Pt is HD stable     Pulm:   Hypercapnic and hypoxic 2/2 COPD exacerbation from possible seasonal allergies vs pna. ABG yesterday 7.18/80/84/22 but has been more awake.   Removed from BIPAP overnight and now stable satting well on 2NC; Plan for BIPAP at night only  Mental status improved/resolved  Discontinue Neb treatments; Start Albuterol PRN Q6 Hrs  Discontinue IV steroids; Start Prednisone 40mg PO QD  Spiriva started 18mcg    GI:   Regular diet  Continue PPI    Renal:   Strict i/os  Renally dose meds  Mild increase of BUN/Cr from 24/1.50 to 30/1.70  Cont Flomax     ID:   Levaquin for possible PNA.  Wbc 14k, improved to 6.49.  No fever or radiographic infiltrate so anticipate short course. F/u cxs.   Levaquin changed IV to PO; Monitor renal function given potential toxicity    Heme:   Lovenox sq for chemical dvt ppx with SCDs.   Cont Aspirin / Plavix    Endo:   ISS Q6 Hrs.   A1C 7.1  Cont Solumedrol 60mg BID for COPD exacerbation     Dispo:   Plan for discharge home today/tomorrow or transfer to floor since hemodynamically stable     Patient is a 81 year old male with a pmhx of COPD (On home O2 3L and nocturnal bilevel), eosinophilic asthma, CAD (w/ 3v CABG 2004), s/p 2 recent coronary stents at [place], PVD s/p LLE stent (11/2019), HLD, DM2 on metformin BPH, hx Hypercapnic Respiratory Failure/Cardiac Arrest requiring intubation (6/18), multiple frequent admissions for COPD exacerbations who presents with shortness of breath for the past day, found to have:    1. Acute hypoxic/hypercapnic resp failure   2. COPD exacerbation   3. Possible pna   4. AMS  5. ckd    Plan    Neuro:   Mental status improved.   Avoid sedatives while recovering from hypercapnia.  OOB/WBAT/PT    Cardio:   Continue Cozar, Lipitor, Lopressor. Pt is HD stable     Pulm:   Hypercapnic and hypoxic 2/2 COPD exacerbation from possible seasonal allergies vs pna. ABG yesterday 7.18/80/84/22 but has been more awake.   Removed from BIPAP overnight and now stable satting well on 2NC; Plan for BIPAP at night only  Mental status improved/resolved  Discontinue Neb treatments; Start Albuterol PRN Q6 Hrs  Discontinue IV steroids; Start Prednisone 40mg PO QD  Spiriva started 18mcg; Symbicort 160mcg  Pulmonology recommendations    GI:   Regular diet  Continue PPI    Renal:   Strict i/os  Renally dose meds  Mild increase of BUN/Cr from 24/1.50 to 30/1.70  Cont Flomax     ID:   Levaquin for possible PNA.  Wbc 14k, improved to 6.49.  No fever or radiographic infiltrate so anticipate short course. F/u cxs.   Levaquin changed IV to PO; Monitor renal function given potential toxicity    Heme:   Lovenox sq for chemical dvt ppx with SCDs.   Cont Aspirin / Plavix    Endo:   ISS Q6 Hrs.   A1C 7.1  Cont Solumedrol 60mg BID for COPD exacerbation     Dispo:   Plan for discharge home today/tomorrow or transfer to floor since hemodynamically stable

## 2021-05-18 NOTE — DIETITIAN INITIAL EVALUATION ADULT. - PROBLEM SELECTOR PLAN 3
- Stable  - Hold home Metformin, in the setting of acidemia  hold glipizide  - Leyda, ISS, Carb Count Diet  - f/u HgA1c
Hospital chart

## 2021-05-18 NOTE — PROGRESS NOTE ADULT - ASSESSMENT
COMMODORE YUE [facility] P [phone]  1940 DOA 2021 HOSPITALIST            REVIEW OF SYMPTOMS      Able to give ROS  Yes     RELIABLE +/-   CONSTITUTIONAL Weakness Yes  Chills No   ENDOCRINE  No heat or cold intolerance    ALLERGY No hives  Sore throat No stridor  RESP Coughing blood no  Shortness of breath YES   NEURO No Headache  Confusion Pain neck No   CARDIAC No Chest pain No Palpitations   GI  Pain abdomen NO   Vomiting NO     PHYSICAL EXAM    HEENT Unremarkable  atraumatic   RESP Fair air entry EXP prolonged    Harsh breath sound Resp distres mild   CARDIAC S1 S2 No S3     NO JVD    ABDOMEN SOFT BS PRESENT NOT DISTENDED No hepatosplenomegaly PEDAL EDEMA present No calf tenderness  NO rash     COMMODORE YUE [facility] P 81 m  PROBLEMS 2021 admission      COVID AB (+) )   RESP FAILURE poa   COPD ex poss triggere by flowers poa   AOC HYPERCAPNIC RESP FAILURE poa  pH 716  BPAP Started 2021   MERRILL poa   COVID VACCINATION 2 WK PTA     PMH FORMER SMOKER  PMH COPD GRADE D 2L NOCT BPAP   PMH INTUBATION   PMH CAC 2018   PMH CAD 3V CABG 2004  PMH CAD STENT 2019   PMH HFPEF 2020 DD1   PMH DM2        PATIENT DATA                ABG.     2021 11a bpap 15/.3 718/80/84  2021 9a nrb 716/86/347               OXYGENATION.                   VITALS/IO/VENT/DRIPS.  2021 afeb 110 130/70   2021 2a 18//.21     GLOBAL AND BEST PRACTICE ISSUES                     ALLERGY.    SHELLFISH  WEIGHT.                                BMI.                          ADVANCED DIRECTIVE.                               HEAD OF BED ELEVATION. Yes  DVT PROPHYLAXIS.     LVNX 40 (517)                  MCCORMICK PROPHYLAXIS. PROTONIX 40 ()   APA.       ASA 81 ()   PLAVIX 75 ()                                                              DYSPHAGIA RECOMM.   DIET.   DASH ()    INFECTION PROPHYLAXIS.       ASSESSMENT/RECOMMENDATIONS.    COVID   COVID ab 2021 posv  RESP TRACT INFECTION   W -2021 w 14.3 - 6.4   CXR  No pneu   Check cult pr   empiric abio   AOC RESP FAILURE poa   BPAP or avaps   Optimize abg with q 2 h check and intubation if fails nppv   COPD ex  LEVQIN ()   SYMBICORT 160 ()   PRED 40 ()   SPIRIVA ()   MONTELUKAST ()   Cont rx  CAD  Tr 2021 Tr negv  Cont asa plavx metoprolol arb   HFPEF  bnp 2021 bnp 312   RO VTE   Check venous duplx   MERRILL  Cr -2021 Cr 1.5- 1.7     OVERALL RECOMMENDATIONS  Improved clinically  Suggest venous duplex to exclude dvt   Monitor renal status   may need fluids Is on arb       TIME SPENT   Over 25 minutes aggregate care time spent on encounter; activities included   direct patient care, counseling and/or coordinating care reviewing notes, lab data/ imaging , discussion with multidisciplinary team/ patient  /family and explaining in detail risks, benefits, alternatives  of the recommendations     COMMODORE TURNER [facility] P [phone]  1940 DOA 2021 HOSPITALIST

## 2021-05-19 ENCOUNTER — TRANSCRIPTION ENCOUNTER (OUTPATIENT)
Age: 81
End: 2021-05-19

## 2021-05-19 VITALS — DIASTOLIC BLOOD PRESSURE: 68 MMHG | HEART RATE: 85 BPM | SYSTOLIC BLOOD PRESSURE: 102 MMHG | RESPIRATION RATE: 26 BRPM

## 2021-05-19 LAB
ALBUMIN SERPL ELPH-MCNC: 3.7 G/DL — SIGNIFICANT CHANGE UP (ref 3.3–5)
ALP SERPL-CCNC: 93 U/L — SIGNIFICANT CHANGE UP (ref 40–120)
ALT FLD-CCNC: 18 U/L — SIGNIFICANT CHANGE UP (ref 12–78)
ANION GAP SERPL CALC-SCNC: 4 MMOL/L — LOW (ref 5–17)
APTT BLD: 28.8 SEC — SIGNIFICANT CHANGE UP (ref 27.5–35.5)
AST SERPL-CCNC: 11 U/L — LOW (ref 15–37)
BASE EXCESS BLDA CALC-SCNC: 4.2 MMOL/L — HIGH (ref -2–2)
BASOPHILS # BLD AUTO: 0.01 K/UL — SIGNIFICANT CHANGE UP (ref 0–0.2)
BASOPHILS NFR BLD AUTO: 0.1 % — SIGNIFICANT CHANGE UP (ref 0–2)
BILIRUB SERPL-MCNC: 0.3 MG/DL — SIGNIFICANT CHANGE UP (ref 0.2–1.2)
BLOOD GAS COMMENTS ARTERIAL: SIGNIFICANT CHANGE UP
BLOOD GAS COMMENTS ARTERIAL: SIGNIFICANT CHANGE UP
BUN SERPL-MCNC: 33 MG/DL — HIGH (ref 7–23)
CALCIUM SERPL-MCNC: 9.7 MG/DL — SIGNIFICANT CHANGE UP (ref 8.5–10.1)
CHLORIDE SERPL-SCNC: 102 MMOL/L — SIGNIFICANT CHANGE UP (ref 96–108)
CO2 SERPL-SCNC: 33 MMOL/L — HIGH (ref 22–31)
CREAT SERPL-MCNC: 1.6 MG/DL — HIGH (ref 0.5–1.3)
EOSINOPHIL # BLD AUTO: 0 K/UL — SIGNIFICANT CHANGE UP (ref 0–0.5)
EOSINOPHIL NFR BLD AUTO: 0 % — SIGNIFICANT CHANGE UP (ref 0–6)
GLUCOSE SERPL-MCNC: 141 MG/DL — HIGH (ref 70–99)
HCO3 BLDA-SCNC: 28 MMOL/L — HIGH (ref 23–27)
HCT VFR BLD CALC: 42.8 % — SIGNIFICANT CHANGE UP (ref 39–50)
HGB BLD-MCNC: 13.4 G/DL — SIGNIFICANT CHANGE UP (ref 13–17)
HOROWITZ INDEX BLDA+IHG-RTO: 31 — SIGNIFICANT CHANGE UP
IMM GRANULOCYTES NFR BLD AUTO: 0.6 % — SIGNIFICANT CHANGE UP (ref 0–1.5)
INR BLD: 0.98 RATIO — SIGNIFICANT CHANGE UP (ref 0.88–1.16)
LYMPHOCYTES # BLD AUTO: 0.99 K/UL — LOW (ref 1–3.3)
LYMPHOCYTES # BLD AUTO: 7.8 % — LOW (ref 13–44)
MAGNESIUM SERPL-MCNC: 2.5 MG/DL — SIGNIFICANT CHANGE UP (ref 1.6–2.6)
MCHC RBC-ENTMCNC: 27.3 PG — SIGNIFICANT CHANGE UP (ref 27–34)
MCHC RBC-ENTMCNC: 31.3 GM/DL — LOW (ref 32–36)
MCV RBC AUTO: 87.3 FL — SIGNIFICANT CHANGE UP (ref 80–100)
MONOCYTES # BLD AUTO: 1.18 K/UL — HIGH (ref 0–0.9)
MONOCYTES NFR BLD AUTO: 9.3 % — SIGNIFICANT CHANGE UP (ref 2–14)
NEUTROPHILS # BLD AUTO: 10.42 K/UL — HIGH (ref 1.8–7.4)
NEUTROPHILS NFR BLD AUTO: 82.2 % — HIGH (ref 43–77)
NRBC # BLD: 0 /100 WBCS — SIGNIFICANT CHANGE UP (ref 0–0)
PCO2 BLDA: 48 MMHG — HIGH (ref 32–46)
PH BLDA: 7.4 — SIGNIFICANT CHANGE UP (ref 7.35–7.45)
PHOSPHATE SERPL-MCNC: 2.9 MG/DL — SIGNIFICANT CHANGE UP (ref 2.5–4.5)
PLATELET # BLD AUTO: 256 K/UL — SIGNIFICANT CHANGE UP (ref 150–400)
PO2 BLDA: 89 MMHG — SIGNIFICANT CHANGE UP (ref 74–108)
POTASSIUM SERPL-MCNC: 4.5 MMOL/L — SIGNIFICANT CHANGE UP (ref 3.5–5.3)
POTASSIUM SERPL-SCNC: 4.5 MMOL/L — SIGNIFICANT CHANGE UP (ref 3.5–5.3)
PROT SERPL-MCNC: 7.3 G/DL — SIGNIFICANT CHANGE UP (ref 6–8.3)
PROTHROM AB SERPL-ACNC: 11.5 SEC — SIGNIFICANT CHANGE UP (ref 10.6–13.6)
RBC # BLD: 4.9 M/UL — SIGNIFICANT CHANGE UP (ref 4.2–5.8)
RBC # FLD: 13.2 % — SIGNIFICANT CHANGE UP (ref 10.3–14.5)
SAO2 % BLDA: 97 % — HIGH (ref 92–96)
SODIUM SERPL-SCNC: 139 MMOL/L — SIGNIFICANT CHANGE UP (ref 135–145)
WBC # BLD: 12.67 K/UL — HIGH (ref 3.8–10.5)
WBC # FLD AUTO: 12.67 K/UL — HIGH (ref 3.8–10.5)

## 2021-05-19 PROCEDURE — 94660 CPAP INITIATION&MGMT: CPT

## 2021-05-19 PROCEDURE — 94760 N-INVAS EAR/PLS OXIMETRY 1: CPT

## 2021-05-19 PROCEDURE — 93005 ELECTROCARDIOGRAM TRACING: CPT

## 2021-05-19 PROCEDURE — 80053 COMPREHEN METABOLIC PANEL: CPT

## 2021-05-19 PROCEDURE — 85027 COMPLETE CBC AUTOMATED: CPT

## 2021-05-19 PROCEDURE — 71045 X-RAY EXAM CHEST 1 VIEW: CPT

## 2021-05-19 PROCEDURE — 99221 1ST HOSP IP/OBS SF/LOW 40: CPT

## 2021-05-19 PROCEDURE — 85025 COMPLETE CBC W/AUTO DIFF WBC: CPT

## 2021-05-19 PROCEDURE — 86769 SARS-COV-2 COVID-19 ANTIBODY: CPT

## 2021-05-19 PROCEDURE — 99231 SBSQ HOSP IP/OBS SF/LOW 25: CPT

## 2021-05-19 PROCEDURE — 85610 PROTHROMBIN TIME: CPT

## 2021-05-19 PROCEDURE — 82962 GLUCOSE BLOOD TEST: CPT

## 2021-05-19 PROCEDURE — 83735 ASSAY OF MAGNESIUM: CPT

## 2021-05-19 PROCEDURE — 0225U NFCT DS DNA&RNA 21 SARSCOV2: CPT

## 2021-05-19 PROCEDURE — 84100 ASSAY OF PHOSPHORUS: CPT

## 2021-05-19 PROCEDURE — 83880 ASSAY OF NATRIURETIC PEPTIDE: CPT

## 2021-05-19 PROCEDURE — 82803 BLOOD GASES ANY COMBINATION: CPT

## 2021-05-19 PROCEDURE — 96374 THER/PROPH/DIAG INJ IV PUSH: CPT

## 2021-05-19 PROCEDURE — 36600 WITHDRAWAL OF ARTERIAL BLOOD: CPT

## 2021-05-19 PROCEDURE — 99232 SBSQ HOSP IP/OBS MODERATE 35: CPT

## 2021-05-19 PROCEDURE — 36415 COLL VENOUS BLD VENIPUNCTURE: CPT

## 2021-05-19 PROCEDURE — 83036 HEMOGLOBIN GLYCOSYLATED A1C: CPT

## 2021-05-19 PROCEDURE — 99233 SBSQ HOSP IP/OBS HIGH 50: CPT | Mod: GC

## 2021-05-19 PROCEDURE — 84484 ASSAY OF TROPONIN QUANT: CPT

## 2021-05-19 PROCEDURE — 85730 THROMBOPLASTIN TIME PARTIAL: CPT

## 2021-05-19 PROCEDURE — 94640 AIRWAY INHALATION TREATMENT: CPT

## 2021-05-19 PROCEDURE — 99291 CRITICAL CARE FIRST HOUR: CPT | Mod: 25

## 2021-05-19 RX ORDER — AZITHROMYCIN 500 MG/1
1 TABLET, FILM COATED ORAL
Qty: 0 | Refills: 0 | DISCHARGE

## 2021-05-19 RX ORDER — LOSARTAN POTASSIUM 100 MG/1
1 TABLET, FILM COATED ORAL
Qty: 0 | Refills: 0 | DISCHARGE
Start: 2021-05-19

## 2021-05-19 RX ORDER — MONTELUKAST 4 MG/1
1 TABLET, CHEWABLE ORAL
Qty: 0 | Refills: 0 | DISCHARGE

## 2021-05-19 RX ORDER — LOSARTAN POTASSIUM 100 MG/1
1 TABLET, FILM COATED ORAL
Qty: 0 | Refills: 0 | DISCHARGE

## 2021-05-19 RX ADMIN — Medication 81 MILLIGRAM(S): at 12:48

## 2021-05-19 RX ADMIN — Medication 0: at 05:36

## 2021-05-19 RX ADMIN — Medication 12.5 MILLIGRAM(S): at 05:34

## 2021-05-19 RX ADMIN — Medication 1 DROP(S): at 05:36

## 2021-05-19 RX ADMIN — Medication 1: at 00:14

## 2021-05-19 RX ADMIN — LOSARTAN POTASSIUM 25 MILLIGRAM(S): 100 TABLET, FILM COATED ORAL at 05:34

## 2021-05-19 RX ADMIN — PANTOPRAZOLE SODIUM 40 MILLIGRAM(S): 20 TABLET, DELAYED RELEASE ORAL at 12:48

## 2021-05-19 RX ADMIN — Medication 2: at 12:46

## 2021-05-19 RX ADMIN — ENOXAPARIN SODIUM 40 MILLIGRAM(S): 100 INJECTION SUBCUTANEOUS at 12:48

## 2021-05-19 RX ADMIN — CLOPIDOGREL BISULFATE 75 MILLIGRAM(S): 75 TABLET, FILM COATED ORAL at 12:48

## 2021-05-19 RX ADMIN — Medication 40 MILLIGRAM(S): at 05:34

## 2021-05-19 NOTE — PROGRESS NOTE ADULT - ASSESSMENT
81 year old male with a pmhx of COPD (On home O2 3L and nocturnal bilevel), eosinophilic asthma, CAD (w/ 3v CABG 2004), s/p 2 recent coronary stents at [place], PVD s/p LLE stent (11/2019), HLD, DM2 on metformin BPH, hx Hypercapnic Respiratory Failure/Cardiac Arrest requiring intubation (6/18), multiple frequent admissions for COPD exacerbations who presents with shortness of breath for the past day, found to have:    Neuro: Mental status improved. Avoid sedatives while recovering from hypercapnia. OOB/WBAT/PT  Cardio: Continue Cozar, Lipitor, Lopressor. Pt is HD stable   Pulm: Hypercapnic and hypoxic 2/2 COPD exacerbation from possible seasonal allergies vs pna. Tolerating 2L NC. Plan for BIPAP at night only. Albuterol PRN q6h, Prednisone 40mg PO qd, Spiriva 18mcg; Symbicort 160mcg  GI: Regular diet. Continue PPI  Renal: Strict i/os. Renally dose meds. MERRILL improving. Cont Flomax   ID: Levaquin changed IV to PO for possible PNA. WBC uptrending likely 2/2 steroids. No fever or radiographic infiltrate. F/u cxs. Monitor renal function given potential toxicity  Heme: Lovenox sq for chemical dvt ppx with SCDs. Cont Aspirin / Plavix  Endo: ISS Q6 Hrs. A1C 7.1. Cont Solumedrol 60mg BID for COPD exacerbation   Dispo: Plan for discharge home today

## 2021-05-19 NOTE — DISCHARGE NOTE NURSING/CASE MANAGEMENT/SOCIAL WORK - PATIENT PORTAL LINK FT
You can access the FollowMyHealth Patient Portal offered by [facility] by registering at the following website: [email or web address]. By joining [employer]’s FollowMyHealth portal, you will also be able to view your health information using other applications (apps) compatible with our system.

## 2021-05-19 NOTE — PROGRESS NOTE ADULT - REASON FOR ADMISSION
Hypercapnic Respiratory Failure

## 2021-05-19 NOTE — PROGRESS NOTE ADULT - SUBJECTIVE AND OBJECTIVE BOX
Patient is a 81y old  Male who presents with a chief complaint of Hypercapnic Respiratory Failure (19 May 2021 08:24)      INTERVAL HPI/OVERNIGHT EVENTS: Patient seen and examined at bedside. No overnight events occurred. Patient has no complaints at this time. Denies fevers, chills, headache, lightheadedness, chest pain, dyspnea, abdominal pain, n/v/d/c.    MEDICATIONS  (STANDING):  artificial  tears Solution 1 Drop(s) Both EYES every 12 hours  aspirin enteric coated 81 milliGRAM(s) Oral daily  atorvastatin 80 milliGRAM(s) Oral at bedtime  budesonide 160 MICROgram(s)/formoterol 4.5 MICROgram(s) Inhaler 2 Puff(s) Inhalation two times a day  clopidogrel Tablet 75 milliGRAM(s) Oral daily  dextrose 40% Gel 15 Gram(s) Oral once  dextrose 5%. 1000 milliLiter(s) (50 mL/Hr) IV Continuous <Continuous>  dextrose 5%. 1000 milliLiter(s) (100 mL/Hr) IV Continuous <Continuous>  dextrose 50% Injectable 25 Gram(s) IV Push once  dextrose 50% Injectable 12.5 Gram(s) IV Push once  dextrose 50% Injectable 25 Gram(s) IV Push once  enoxaparin Injectable 40 milliGRAM(s) SubCutaneous daily  glucagon  Injectable 1 milliGRAM(s) IntraMuscular once  insulin lispro (ADMELOG) corrective regimen sliding scale   SubCutaneous every 6 hours  losartan 25 milliGRAM(s) Oral daily  metoprolol tartrate 12.5 milliGRAM(s) Oral every 12 hours  montelukast 10 milliGRAM(s) Oral at bedtime  pantoprazole  Injectable 40 milliGRAM(s) IV Push daily  predniSONE   Tablet 40 milliGRAM(s) Oral daily  tamsulosin 0.4 milliGRAM(s) Oral at bedtime  tiotropium 18 MICROgram(s) Capsule 1 Capsule(s) Inhalation daily    MEDICATIONS  (PRN):  ALBUTerol    90 MICROgram(s) HFA Inhaler 2 Puff(s) Inhalation every 6 hours PRN Shortness of Breath and/or Wheezing      Allergies    No Known Allergies    Intolerances    shellfish (Nausea)      REVIEW OF SYSTEMS:  CONSTITUTIONAL: No fever or chills  HEENT:  No headache, no sore throat  RESPIRATORY: No cough, wheezing, or shortness of breath  CARDIOVASCULAR: No chest pain, palpitations  GASTROINTESTINAL: No abd pain, nausea, vomiting, or diarrhea  GENITOURINARY: No dysuria, frequency, or hematuria  NEUROLOGICAL: no focal weakness or dizziness  MUSCULOSKELETAL: no myalgias     Vital Signs Last 24 Hrs  T(C): 36.4 (19 May 2021 03:40), Max: 37 (18 May 2021 16:27)  T(F): 97.5 (19 May 2021 03:40), Max: 98.6 (18 May 2021 16:27)  HR: 91 (19 May 2021 10:00) (70 - 112)  BP: 135/73 (19 May 2021 10:00) (91/65 - 142/63)  BP(mean): 93 (19 May 2021 10:00) (74 - 100)  RR: 31 (19 May 2021 10:00) (14 - 43)  SpO2: 96% (19 May 2021 10:00) (94% - 100%)    PHYSICAL EXAM:  GENERAL: NAD  HEENT:  anicteric, moist mucous membranes  CHEST/LUNG:  good air entry b/l, no wheezing, on NC  HEART:  RRR, S1, S2  ABDOMEN:  BS+, soft, nontender, nondistended  EXTREMITIES: no edema, cyanosis, or calf tenderness  NERVOUS SYSTEM: answers questions and follows commands appropriately    LABS:                        13.4   12.67 )-----------( 256      ( 19 May 2021 05:36 )             42.8     CBC Full  -  ( 19 May 2021 05:36 )  WBC Count : 12.67 K/uL  Hemoglobin : 13.4 g/dL  Hematocrit : 42.8 %  Platelet Count - Automated : 256 K/uL  Mean Cell Volume : 87.3 fl  Mean Cell Hemoglobin : 27.3 pg  Mean Cell Hemoglobin Concentration : 31.3 gm/dL  Auto Neutrophil # : 10.42 K/uL  Auto Lymphocyte # : 0.99 K/uL  Auto Monocyte # : 1.18 K/uL  Auto Eosinophil # : 0.00 K/uL  Auto Basophil # : 0.01 K/uL  Auto Neutrophil % : 82.2 %  Auto Lymphocyte % : 7.8 %  Auto Monocyte % : 9.3 %  Auto Eosinophil % : 0.0 %  Auto Basophil % : 0.1 %    19 May 2021 05:36    139    |  102    |  33     ----------------------------<  141    4.5     |  33     |  1.60     Ca    9.7        19 May 2021 05:36  Phos  2.9       19 May 2021 05:36  Mg     2.5       19 May 2021 05:36    TPro  7.3    /  Alb  3.7    /  TBili  0.3    /  DBili  x      /  AST  11     /  ALT  18     /  AlkPhos  93     19 May 2021 05:36    PT/INR - ( 19 May 2021 05:36 )   PT: 11.5 sec;   INR: 0.98 ratio         PTT - ( 19 May 2021 05:36 )  PTT:28.8 sec    CAPILLARY BLOOD GLUCOSE      POCT Blood Glucose.: 232 mg/dL (19 May 2021 12:44)  POCT Blood Glucose.: 144 mg/dL (19 May 2021 05:33)  POCT Blood Glucose.: 181 mg/dL (18 May 2021 23:26)  POCT Blood Glucose.: 347 mg/dL (18 May 2021 18:35)      RADIOLOGY & ADDITIONAL TESTS:    Personally reviewed.     Consultant(s) Notes Reviewed:  [x] YES  [ ] NO

## 2021-05-19 NOTE — PROGRESS NOTE ADULT - TIME BILLING
as above. documented by attending physician.
as above. documented by attending physician.
80M HTN, CAD, PCI, CABG, PVD, DM2, COPD/asthma on O2 and qhs BiPAP, cardiac arrest (resp mediated) in Oct 2018 (ROSC after 17 mins, s/p hypothermia protocol), now admitted with acute hypercapnic resp failure due to COPD exacerbation likely from environmental factors.     Improved, back to baseline per patient and wife, ABG significantly improved   Will discharge her home with tapering steroid course after which he will continue prednisone 5 mg daily   Continue other meds at home including prophylactic azithromycin   Ambulated w/o issues today   Wife updated at bedside
80M HTN, CAD, PCI, CABG, PVD, DM2, COPD/asthma on O2 and qhs BiPAP, cardiac arrest (resp mediated) in Oct 2018 (ROSC after 17 mins, s/p hypothermia protocol), now admitted with acute hypercapnic resp failure due to COPD exacerbation likely from environmental factors.     Clinically improved with NIV, has been off bipap since am and doing fine   Start diet   Change steroids to prednisone 40 mg daily, will give tapering dose on dicharge  Start Spiriva, change duonebs to prn albuterol   Start Symbicort   Change LQ to po for a total of 3 days   Continue other meds   Lovenox for DVT ppx   OOB   Likely discharge home tomorrow  Wife updated at bedside

## 2021-05-19 NOTE — PROGRESS NOTE ADULT - SUBJECTIVE AND OBJECTIVE BOX
YUE COTTO    [facility] ICU1 19A    Patient is a 81y old  Male who presents with a chief complaint of Hypercapnic Respiratory Failure (19 May 2021 07:29)       Allergies    No Known Allergies    Intolerances    shellfish (Nausea)      HPI:  81M with CAD, COPD (3L Home O2 + BIPAP at night), DM2 presents to the ED today for increasing confusion and lethargy along with SOB. Most of the history was obtained from the chart and his spouse, as the patient himself was very lethargic to give a meaningful history. Patient is well known to us due to multiple admissions in the past for Hypercapnic Respiratory Failure.  He has required intubation in the past and his last admission was in 2020, also for hypercapnia.     Today in the ED the patient was found to be somnolent, routine labs and imaging shows patient with a pH of 7.16 and pCO2 of 86.  He was placed on BIPAP, given nebulizers, steroids and antibiotics. CXR was negative for consolidation, infiltrates or fluid. Spouse reports recent completion of COVID vaccination series and denies any recent travel, sick contacts, fevers or chill at home. Patients repeat 1 hour blood gas with minimal improvement and as been admitted to our ICU for further management.  (17 May 2021 15:11)      PAST MEDICAL & SURGICAL HISTORY:  Diabetes Mellitus, Type II    CAD (Coronary Artery Disease)  s/p 3v CABG ; stents placed in [place] in     Dyslipidemia    Osteomyelitis    COPD (chronic obstructive pulmonary disease)  on 2L at home and BiPAP at night; intubated     Hypertension    PVD (peripheral vascular disease)    CABG (Coronary Artery Bypass Graft)      Compound fracture  left leg    S/P primary angioplasty with coronary stent        FAMILY HISTORY:  Family history of diabetes mellitus (Sibling)    Family hx of lung cancer  brother,  age 82, used to smoke with pt          MEDICATIONS   ALBUTerol    90 MICROgram(s) HFA Inhaler 2 Puff(s) Inhalation every 6 hours PRN  artificial  tears Solution 1 Drop(s) Both EYES every 12 hours  aspirin enteric coated 81 milliGRAM(s) Oral daily  atorvastatin 80 milliGRAM(s) Oral at bedtime  budesonide 160 MICROgram(s)/formoterol 4.5 MICROgram(s) Inhaler 2 Puff(s) Inhalation two times a day  clopidogrel Tablet 75 milliGRAM(s) Oral daily  dextrose 40% Gel 15 Gram(s) Oral once  dextrose 5%. 1000 milliLiter(s) IV Continuous <Continuous>  dextrose 5%. 1000 milliLiter(s) IV Continuous <Continuous>  dextrose 50% Injectable 25 Gram(s) IV Push once  dextrose 50% Injectable 12.5 Gram(s) IV Push once  dextrose 50% Injectable 25 Gram(s) IV Push once  enoxaparin Injectable 40 milliGRAM(s) SubCutaneous daily  glucagon  Injectable 1 milliGRAM(s) IntraMuscular once  insulin lispro (ADMELOG) corrective regimen sliding scale   SubCutaneous every 6 hours  levoFLOXacin  Tablet 500 milliGRAM(s) Oral every 24 hours  losartan 25 milliGRAM(s) Oral daily  metoprolol tartrate 12.5 milliGRAM(s) Oral every 12 hours  montelukast 10 milliGRAM(s) Oral at bedtime  pantoprazole  Injectable 40 milliGRAM(s) IV Push daily  predniSONE   Tablet 40 milliGRAM(s) Oral daily  tamsulosin 0.4 milliGRAM(s) Oral at bedtime  tiotropium 18 MICROgram(s) Capsule 1 Capsule(s) Inhalation daily      Vital Signs Last 24 Hrs  T(C): 36.4 (19 May 2021 03:40), Max: 37 (18 May 2021 16:27)  T(F): 97.5 (19 May 2021 03:40), Max: 98.6 (18 May 2021 16:27)  HR: 84 (19 May 2021 07:00) (70 - 112)  BP: 125/84 (19 May 2021 07:00) (91/65 - 143/73)  BP(mean): 100 (19 May 2021 07:00) (74 - 101)  RR: 25 (19 May 2021 07:00) (14 - 43)  SpO2: 98% (19 May 2021 07:00) (94% - 100%)      21 @ 07:01  -  21 @ 07:00  --------------------------------------------------------  IN: 0 mL / OUT: 1000 mL / NET: -1000 mL            LABS:                        13.4   12.67 )-----------( 256      ( 19 May 2021 05:36 )             42.8         139  |  102  |  33<H>  ----------------------------<  141<H>  4.5   |  33<H>  |  1.60<H>    Ca    9.7      19 May 2021 05:36  Phos  2.9       Mg     2.5         TPro  7.3  /  Alb  3.7  /  TBili  0.3  /  DBili  x   /  AST  11<L>  /  ALT  18  /  AlkPhos  93      PT/INR - ( 19 May 2021 05:36 )   PT: 11.5 sec;   INR: 0.98 ratio         PTT - ( 19 May 2021 05:36 )  PTT:28.8 sec      ABG - ( 17 May 2021 11:42 )  pH, Arterial: 7.18  pH, Blood: x     /  pCO2: 80    /  pO2: 84    / HCO3: 22    / Base Excess: 1.0   /  SaO2: 94                  WBC:  WBC Count: 12.67 K/uL ( @ 05:36)  WBC Count: 6.49 K/uL ( @ 05:17)  WBC Count: 14.36 K/uL ( @ 09:59)      MICROBIOLOGY:  RECENT CULTURES:        CARDIAC MARKERS ( 17 May 2021 10:40 )  <.015 ng/mL / x     / x     / x     / x            PT/INR - ( 19 May 2021 05:36 )   PT: 11.5 sec;   INR: 0.98 ratio         PTT - ( 19 May 2021 05:36 )  PTT:28.8 sec    Sodium:  Sodium, Serum: 139 mmol/L ( @ 05:36)  Sodium, Serum: 142 mmol/L ( @ 05:17)  Sodium, Serum: 139 mmol/L ( @ 10:40)      1.60 mg/dL  @ 05:36  1.70 mg/dL  @ 05:17  1.50 mg/dL  @ 10:40      Hemoglobin:  Hemoglobin: 13.4 g/dL ( @ 05:36)  Hemoglobin: 14.1 g/dL ( @ 05:17)  Hemoglobin: 15.4 g/dL ( @ 09:59)      Platelets: Platelet Count - Automated: 256 K/uL ( @ 05:36)  Platelet Count - Automated: 246 K/uL ( @ 05:17)  Platelet Count - Automated: 292 K/uL ( @ 09:59)      LIVER FUNCTIONS - ( 19 May 2021 05:36 )  Alb: 3.7 g/dL / Pro: 7.3 g/dL / ALK PHOS: 93 U/L / ALT: 18 U/L / AST: 11 U/L / GGT: x                 RADIOLOGY & ADDITIONAL STUDIES:

## 2021-05-19 NOTE — PROGRESS NOTE ADULT - ASSESSMENT
81M with CAD, COPD (3L Home O2 + BIPAP at night), DM2, HLD and BPH admitted for Hypercapnic Respiratory Failure.   RVP negative    Acute Hypercapnic Respiratory Failure / Acute Metabolic Encephalopathy / COPD  Hypercapnia related to underlying COPD; s/p bipap use, clinically improved  Nebulizers + po steroids   Symbicort  COPD with baseline 3L at home prn (mostly nocturnal nasal cannula use) along with BIPAP PRN   Management as per Pulmonary Critical Care (Dr. Dejesus)    CAD  History of CABG and PCI x 3   Aspirin + Losartan  Last Echocardiogram noted; Consider repeat     DM2  ISS and FS per protocol and maintain BS < 180  accuchecks    BPH  Flomax    Diet  dash, carb controlled    DVT Prophylaxis  SCD    Disposition  Full Code/Inpatient  Currently in ICU for critical care management   Dispo pending, expect d/c home in 24-48hrs

## 2021-05-19 NOTE — PROGRESS NOTE ADULT - SUBJECTIVE AND OBJECTIVE BOX
Patient is a 81y old  Male who presents with a chief complaint of Hypercapnic Respiratory Failure (18 May 2021 16:57)    24 hour events: Pt seen and examined at bedside. No overnight events. No complaints at this time. Patient is resting comfortably in bed.    REVIEW OF SYSTEMS  Constitutional: No fever, chills, fatigue  Neuro: No headache, numbness, weakness  Resp: No cough, wheezing, shortness of breath  CVS: No chest pain, palpitations, leg swelling  GI: No abdominal pain, nausea, vomiting, diarrhea   : No dysuria, frequency, incontinence  Skin: No itching, burning, rashes, or lesions   Msk: No joint pain or swelling  Psych: No depression, anxiety, mood swings  Heme: No bleeding    T(F): 97.5 (05-19-21 @ 03:40), Max: 98.6 (05-18-21 @ 16:27)  HR: 73 (05-19-21 @ 06:00) (70 - 112)  BP: 113/71 (05-19-21 @ 05:00) (91/65 - 143/73)  RR: 19 (05-19-21 @ 06:00) (14 - 43)  SpO2: 100% (05-19-21 @ 06:00) (94% - 100%)  Wt(kg): --      CAPILLARY BLOOD GLUCOSE      POCT Blood Glucose.: 144 mg/dL (19 May 2021 05:33)  POCT Blood Glucose.: 181 mg/dL (18 May 2021 23:26)  POCT Blood Glucose.: 347 mg/dL (18 May 2021 18:35)  POCT Blood Glucose.: 368 mg/dL (18 May 2021 12:20)    I&O's Summary    05-18 @ 07:01  -  05-19 @ 07:00  --------------------------------------------------------  IN: 0 mL / OUT: 1000 mL / NET: -1000 mL      PHYSICAL EXAM  General: Elderly male in NAD, resting comfortably  HEENT: Pupils equal, reactive to light.  Symmetric.  PULM: b/l air entry, clear breath sounds bilaterally  CVS: +s1/s2  ABD: Soft, nondistended, nontender, normoactive bowel sounds, no masses  EXT: No edema, nontender  SKIN: Warm and well perfused  Neuro: alert, oriented x3, moves all extremities    MEDICATIONS  levoFLOXacin  Tablet Oral    losartan Oral  metoprolol tartrate Oral  tamsulosin Oral    atorvastatin Oral  dextrose 40% Gel Oral  dextrose 50% Injectable IV Push  dextrose 50% Injectable IV Push  dextrose 50% Injectable IV Push  glucagon  Injectable IntraMuscular  insulin lispro (ADMELOG) corrective regimen sliding scale SubCutaneous  predniSONE   Tablet Oral    ALBUTerol    90 MICROgram(s) HFA Inhaler Inhalation PRN  budesonide 160 MICROgram(s)/formoterol 4.5 MICROgram(s) Inhaler Inhalation  montelukast Oral  tiotropium 18 MICROgram(s) Capsule Inhalation        aspirin enteric coated Oral  clopidogrel Tablet Oral  enoxaparin Injectable SubCutaneous    pantoprazole  Injectable IV Push      dextrose 5%. IV Continuous  dextrose 5%. IV Continuous      artificial  tears Solution Both EYES                            13.4   12.67 )-----------( 256      ( 19 May 2021 05:36 )             42.8       05-19    139  |  102  |  33<H>  ----------------------------<  141<H>  4.5   |  33<H>  |  1.60<H>    Ca    9.7      19 May 2021 05:36  Phos  2.9     05-19  Mg     2.5     05-19    TPro  7.3  /  Alb  3.7  /  TBili  0.3  /  DBili  x   /  AST  11<L>  /  ALT  18  /  AlkPhos  93  05-19      CARDIAC MARKERS ( 17 May 2021 10:40 )  <.015 ng/mL / x     / x     / x     / x          PT/INR - ( 19 May 2021 05:36 )   PT: 11.5 sec;   INR: 0.98 ratio         PTT - ( 19 May 2021 05:36 )  PTT:28.8 sec        Rapid RVP Result: NotDetec (05-17 @ 09:53)    Radiology: No new imaging  Bedside lung ultrasound: N/A  Bedside ECHO: N/A    CENTRAL LINE: N          DATE INSERTED:              REMOVE: Y/N  SHARMA: N                        DATE INSERTED:              REMOVE: Y/N  A-LINE: N                       DATE INSERTED:              REMOVE: Y/N    GLOBAL ISSUE/BEST PRACTICE  Analgesia: N  Sedation: N  CAM-ICU:   HOB elevation: yes  Stress ulcer prophylaxis: Y  VTE prophylaxis: Y  Glycemic control: Y  Nutrition:     CODE STATUS: Full  GOC discussion: Y

## 2021-05-19 NOTE — PROGRESS NOTE ADULT - ASSESSMENT
COMMODORE YUE [facility] P [phone]  1940 DOA 2021 HOSPITALIST            REVIEW OF SYMPTOMS      Able to give ROS  Yes     RELIABLE +/-   CONSTITUTIONAL Weakness Yes  Chills No   ENDOCRINE  No heat or cold intolerance    ALLERGY No hives  Sore throat No stridor  RESP Coughing blood no  Shortness of breath YES   NEURO No Headache  Confusion Pain neck No   CARDIAC No Chest pain No Palpitations   GI  Pain abdomen NO   Vomiting NO     PHYSICAL EXAM    HEENT Unremarkable  atraumatic   RESP Fair air entry EXP prolonged    Harsh breath sound Resp distres mild   CARDIAC S1 S2 No S3     NO JVD    ABDOMEN SOFT BS PRESENT NOT DISTENDED No hepatosplenomegaly PEDAL EDEMA present No calf tenderness  NO rash       COMMODORE YUE [facility] P 81 m  PROBLEMS 2021 admission      COVID AB (+) )   RESP FAILURE poa   COPD ex poss triggere by flowers poa   AOC HYPERCAPNIC RESP FAILURE poa  pH 716  BPAP Started 2021   MERRILL poa   COVID VACCINATION 2 WK PTA     PMH FORMER SMOKER  PMH COPD GRADE D 2L NOCT BPAP   PMH INTUBATION   PMH CAC 2018   PMH CAD 3V CABG 2004  PMH CAD STENT 2019   PMH HFPEF 2020 DD1   PMH DM2      PATIENT DATA                ABG.     2021 11a bpap 15//.3 718/80/84  2021 9a nrb 716/86/347               OXYGENATION.       2021 2l 100%             VITALS/IO/VENT/DRIPS.  2021 80 120/80     GLOBAL AND BEST PRACTICE ISSUES                     ALLERGY.    SHELLFISH  WEIGHT.            2021 98 k                     BMI.                       2021 22   ADVANCED DIRECTIVE.                               HEAD OF BED ELEVATION. Yes  DVT PROPHYLAXIS.     LVNX 40 (517)                  MCCORMICK PROPHYLAXIS. PROTONIX 40 ()   APA.       ASA 81 ()   PLAVIX 75 ()                                                              DYSPHAGIA RECOMM.   DIET.   DASH ()    INFECTION PROPHYLAXIS.     ASSESSMENT/RECOMMENDATIONS.    COVID   COVID ab 2021 posv  RESP TRACT INFECTION   W -2021 w 14.3 - 6.4   CXR  No pneu   Check cult pr   empiric abio   AOC RESP FAILURE poa   BPAP or avaps   Optimize abg with q 2 h check and intubation if fails nppv   COPD ex  LEVQIN ()   SYMBICORT 160 ()   PRED 40 ()   SPIRIVA ()   MONTELUKAST ()   Cont rx    CAD  Tr 2021 Tr negv  Cont asa plavx metoprolol arb   HFPEF  bnp 2021 bnp 312   RO VTE   Check venous duplx   MERRILL  Cr --2021 Cr 1.5- 1.7 - 1.6     OVERALL RECOMMENDATIONS  Improved clinically  Suggest venous duplex to exclude dvt   Monitor renal status   may need fluids Is on arb       TIME SPENT   Over 25 minutes aggregate care time spent on encounter; activities included   direct patient care, counseling and/or coordinating care reviewing notes, lab data/ imaging , discussion with multidisciplinary team/ patient  /family and explaining in detail risks, benefits, alternatives  of the recommendations     COMMODORE TURNER [facility] P [phone]  1940 DOA 2021 HOSPITALIST

## 2021-05-25 NOTE — ED ADULT NURSE NOTE - PMH
CAD (Coronary Artery Disease)    COPD (chronic obstructive pulmonary disease)    Diabetes Mellitus, Type II    Dyslipidemia    Hypertension    Osteomyelitis    PVD (peripheral vascular disease)
15-Mar-2014
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
## 2021-06-14 ENCOUNTER — APPOINTMENT (OUTPATIENT)
Dept: VASCULAR SURGERY | Facility: CLINIC | Age: 81
End: 2021-06-14
Payer: MEDICARE

## 2021-06-14 PROCEDURE — 93925 LOWER EXTREMITY STUDY: CPT

## 2021-06-21 NOTE — REASON FOR VISIT
[Follow-Up - From Hospitalization] : a follow-up visit after a recent hospitalization [Asthma] : asthma [COPD] : COPD

## 2021-06-22 ENCOUNTER — APPOINTMENT (OUTPATIENT)
Dept: PULMONOLOGY | Facility: CLINIC | Age: 81
End: 2021-06-22
Payer: MEDICARE

## 2021-06-22 VITALS
RESPIRATION RATE: 18 BRPM | TEMPERATURE: 96.3 F | BODY MASS INDEX: 30.78 KG/M2 | OXYGEN SATURATION: 95 % | SYSTOLIC BLOOD PRESSURE: 138 MMHG | WEIGHT: 215 LBS | DIASTOLIC BLOOD PRESSURE: 81 MMHG | HEART RATE: 100 BPM | HEIGHT: 70 IN

## 2021-06-22 PROCEDURE — 99215 OFFICE O/P EST HI 40 MIN: CPT

## 2021-06-29 NOTE — DISCHARGE NOTE PROVIDER - NSDCFUSCHEDAPPT_GEN_ALL_CORE_FT
YUE COTTO ; 09/23/2019 ; NPP Surg Vasc [address] ; 09/23/2019 ; NPP Surg Vasc [address] ; 09/23/2019 ; P Surg Vasc [address] Tumor Depth: Less than 6mm from granular layer and no invasion beyond the subcutaneous fat

## 2021-06-30 NOTE — PHYSICAL EXAM
[General Appearance - Well Developed] : well developed [Normal Appearance] : normal appearance [Well Groomed] : well groomed [General Appearance - Well Nourished] : well nourished [General Appearance - In No Acute Distress] : no acute distress [Normal Conjunctiva] : the conjunctiva exhibited no abnormalities [Normal Oropharynx] : normal oropharynx [Neck Appearance] : the appearance of the neck was normal [Neck Cervical Mass (___cm)] : no neck mass was observed [Jugular Venous Distention Increased] : there was no jugular-venous distention [Heart Rate And Rhythm] : heart rate and rhythm were normal [Heart Sounds] : normal S1 and S2 [Murmurs] : no murmurs present [Arterial Pulses Normal] : the arterial pulses were normal [Respiration, Rhythm And Depth] : normal respiratory rhythm and effort [Exaggerated Use Of Accessory Muscles For Inspiration] : no accessory muscle use [Abdomen Soft] : soft [Abdomen Tenderness] : non-tender [Abnormal Walk] : normal gait [Nail Clubbing] : no clubbing of the fingernails [Cyanosis, Localized] : no localized cyanosis [Cranial Nerves] : cranial nerves 2-12 were intact [Motor Exam] : the motor exam was normal [Oriented To Time, Place, And Person] : oriented to person, place, and time [Affect] : the affect was normal [Mood] : the mood was normal [Skin Color & Pigmentation] : normal skin color and pigmentation [] : no rash [Skin Lesions] : no skin lesions [FreeTextEntry1] : decreased air entry bilaterally; no wheezing

## 2021-06-30 NOTE — HISTORY OF PRESENT ILLNESS
[FreeTextEntry1] : Mr. 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 His wife reports that his dyspnea worsened with change of season and exposure to flowers at home. She had to provide BiPAP at home during the day and increased his prednisone from 5->10 mg daily. He was treated with BiPAP, steroids, nebulizers, and levofloxacin with improvement. She continues to administer Mepolizumab every month, next due on 6/25. Remains on prednisone 5 mg daily. Will need to reassess for pulmonary rehab.\par \par Currently reports feeling well. No fevers, chills, chest pain, or wheezing. Dyspnea is at baseline. Denies any cough. He is adherent with Breo & Incruse Ellipta every morning and takes montelukast every night. He also takes azithromycin 250 mg MWF. He is adherent with BiPAP (IPAP 18, EPAP 8) every night from 11pm until 9am. He also wears oxygen during the day occasionally with walking, but wife reports that saturation is up to 95% with going up a flight of stairs. \par \par Note, recent ABG in May 2021 with pH 7.16, pCO2 86, pO2 347, SaO2 99%. Repeat ABG after BiPAP improved to pH 7.40, pCO2 48, pO2 89, SaO2 97%.\par \par Unfortunately his eldest son recently passed away in June 2020 from DM and CHF, so patient and wife are now taking care of grandchildren.\par \par Last 2D-echo in January 2020 with mild diastolic dysfunction, normal LV systolic function, no significant valvular disease, and no pulmonary hypertension (estimated RVSP is 15). \par \par Pulmonary rehab currently on pause due to COVID pandemic, but he is willing to resume. \par \par PFTs (Dec 2020) with severe obstruction. TLC is normal, but RV is increased to 165%. Diffusion capacity is moderately reduced.\par \par Exercise oximetry (Dec 2020) normal oxygen saturation at rest, but developed hypoxemia to 85% while walking at 1.1 MPH requiring 2L NC. \par \par 6MWD (Dec 2020) is 132 meters.\par \par Last CT chest in June 2020 with interval improvement and near resolution of LLL pneumonia with minimal residual atelectasis. There is new linear atelectasis in the RLL. Stable emphysema.\par \par Regarding his smoking history, he quit smoking in the 1980s, used to smoke 1 ppd for 30 years. Was smoking marijuana about 3-4 joints 3x/week until 2017. No alcohol use. No other drug use

## 2021-07-01 NOTE — ED PROVIDER NOTE - DATE/TIME 3
06-Jan-2019 11:04 You can access the FollowMyHealth Patient Portal offered by [facility] by registering at the following website: [email or web address]. By joining [employer]’s FollowMyHealth portal, you will also be able to view your health information using other applications (apps) compatible with our system.

## 2021-07-12 ENCOUNTER — APPOINTMENT (OUTPATIENT)
Dept: VASCULAR SURGERY | Facility: CLINIC | Age: 81
End: 2021-07-12
Payer: MEDICARE

## 2021-07-12 VITALS
BODY MASS INDEX: 30.78 KG/M2 | SYSTOLIC BLOOD PRESSURE: 118 MMHG | DIASTOLIC BLOOD PRESSURE: 73 MMHG | HEART RATE: 110 BPM | HEIGHT: 70 IN | WEIGHT: 215 LBS

## 2021-07-12 PROCEDURE — 99213 OFFICE O/P EST LOW 20 MIN: CPT

## 2021-07-12 NOTE — ED PROVIDER NOTE - NEUROLOGICAL, MLM
Primary Physician: Dr. Fitzgerald      Problem List:  1. Coronary artery disease--treated medically.  2. Atrial fibrillation--status post pacemaker placement and on warfarin for stroke risk reduction  3. Essential hypertension  4. Hyperlipidemia  5. History of CVA/TIA      History of Present Illness:   The patient is here for follow up.  He denies lightheadedness, dizziness, or syncope.  He denies paroxysmal nocturnal dyspnea or orthopnea.  He denies rapid weight gain and has right lower extremity edema but not left lower extremity edema.      ALLERGIES:  No Known Allergies      Medication Sig   • potassium chloride (KLOR-CON) 10 MEQ ER tablet TAKE 1 TABLET BY MOUTH TWICE A DAY   • atenolol (TENORMIN) 25 MG tablet TAKE 1 TABLET DAILY   • isosorbide mononitrate (MONOKET) 10 MG tablet TAKE 1 TABLET DAILY   • nitroGLYcerin (NITROSTAT) 0.4 MG sublingual tablet PLACE 1 TABLET UNDER THE TONGUE EVERY 5 MINUTES AS NEEDED FOR CHEST PAIN.   • carBAMazepine (TEGretol) 200 MG tablet TAKE 1 TABLET 4 TIMES DAILY   • latanoprost (XALATAN) 0.005 % ophthalmic solution INSTILL 1 DROP INTO BOTH   EYES DAILY   • apixaBAN (Eliquis) 5 MG Tab Take 1 tablet by mouth every 12 hours.   • simvastatin (ZOCOR) 40 MG tablet TAKE 1 TABLET NIGHTLY   • furosemide (LASIX) 20 MG tablet Takes 1/2 tab daily   • finasteride (PROSCAR) 5 MG tablet Take 1 tablet by mouth daily.   • ferrous sulfate 325 (65 FE) MG EC tablet Take 325 mg by mouth daily.    • cholecalciferol (VITAMIN D3) 1000 UNITS tablet 2 times daily.   • HYDROcodone-acetaminophen (NORCO) 5-325 MG per tablet Take 1 tablet by mouth every 12 hours as needed for Pain.         Past Medical History:   Diagnosis Date   • A-fib (CMS/[facility])    • Amblyopia    • Arthritis    • Cataract and glaucoma syndrome    • Choroidal neovascularization of left eye    • Congestive heart failure (CHF) (CMS/[facility])    • Coronary artery disease    • Cutaneous candidiasis    • Depression    • Dermatitis    • Diverticulitis    •  Dry eye syndrome    • Exotropia of right eye    • H/O unilateral orchiectomy    • Hyperlipemia    • Hypertension    • Ischemic vascular disease    • Pacemaker    • Scrotal abscess    • Shingles    • Sleep apnea    • Stroke (CMS/HCC)    • TIA (transient ischemic attack)    • Trigeminal neuralgia          Past Surgical History:   Procedure Laterality Date   • Cardiac catherization  2011    in [place]: 25% LAD stenosis, 30% proximal left circumflex, 70% ramus intermedius, and 25% RCA.   • Hernia repair     • Orchiectomy Right 10/30/2015    testicle removed   • Pacemaker implant  2011    in [place] [employer] Pacemaker, Model 1210, SN [id number]         Socioeconomic History   • Marital status:      Spouse name: Not on file   • Number of children: 4   Occupational History   • Occupation: Retired -    Tobacco Use   • Smoking status: Former Smoker     Packs/day: 1.00     Years: 15.00     Pack years: 15.00     Types: Cigarettes     Last attempt to quit: 1966     Years since quittin.8   • Smokeless tobacco: Never Used   Substance and Sexual Activity   • Alcohol use: No     Frequency: Never   • Drug use: No         Family Status   Relation Name Status   • Mo     • Bro Chidi Alive   • PGFa     • MAunt     • Son Marbin Alive   • Fa     • Sis Maywood Alive   • MGMo     • MGFa     • PGMo     • Sis Sydnee Alive   • Bro Al    • Devon Montoya  at age 75   • Son Darci Alive   • Son Ko Alive   • Son Isreal Alive         Review of Systems and per History of Present Illness:  Eye Problem(s):negative  ENT Problem(s):negative  Cardiovascular problem(s):chest \"pinching\"  Respiratory problem(s):negative  Gastro-intestinal problem(s):negative GI  Genito-urinary problem(s):negative  Musculoskeletal problem(s):negative   Integumentary problem(s):negative  Neurological problem(s):negative  Psychiatric problem(s):negative  Endocrine  problem(s):negative  Hematologic and/or Lymphatic problem(s):negative        PHYSICAL EXAMINATION:  CONSTITUTIONAL:   Vitals:    07/12/21 1611 07/12/21 1613   BP: 118/80 122/78   BP Location: RUE - Right upper extremity LUE - Left upper extremity   Patient Position: Sitting Sitting   Cuff Size: Regular Regular   Pulse: 84    Weight: 90.1 kg    Height: 5' 8\" (1.727 m)      GENERAL: No apparent distress.  EYES: There is no conjunctival injection, lesions of the eyelids, or arcus senilis.  ENT: There is no oral pallor or cyanosis.  RESPIRATORY: There is a normal respiratory effort with clear lungs to auscultation and percussion bilaterally.  CARDIOVASCULAR: The point of maximum impulse is not palpable. The precordium is normoactive.  There is an irregular rhythm and controlled rate with a normal S1 and S2. There are no murmurs, rubs or gallops audible. The carotid arteries are 2+ bilaterally with no bruits. There is no jugular venous distention or hepatojugular reflux. The abdominal aorta demonstrates no bruits. The femoral pulses are 2+ bilaterally without bruits. The pedal pulses are 2+ bilaterally at the posterior tibial and dorsalis pedis areas. Ulnar pulses are 2+ and radial pulses are 1+ right and 2+ left.  GASTROINTESTINAL: Nontender abdomen with normoactive bowel sounds and no hepatosplenomegaly. There are no masses palpable.  MUSCULOSKELETAL: The patient has a normal gait capable of exercise treadmill testing.  EXTREMITIES: There is no clubbing or cyanosis of the digits or nails. There is trace right lower extremity edema.  SKIN: Warm, dry, and intact.  NEUROLOGIC: The patient is alert and oriented to person, place, and time and has a normal mood and affect.        LABS   Component      Latest Ref Rng & Units 5/7/2020   Fasting Status      Hours 12   CHOLESTEROL      <=199 mg/dL 162   TRIGLYCERIDE      <=149 mg/dL 115   HDL      >=40 mg/dL 48   CALCULATED LDL      <=129 mg/dL 91   CALCULATED NON HDL      mg/dL  114   CHOL/HDL      <=4.4 3.4         ASSESSMENT & PLAN:  1. Coronary artery disease--treated medically.  His pain is non-cardiac based on description.  Lipids and blood pressure are both well controlled.  --Continue current medications    2. Atrial fibrillation--status post pacemaker placement and on warfarin for stroke risk reduction. Rate auto controlled and patient with pacemaker.  --Refer to electrophysiology for pacemaker management  --Anticoagulation managed by primary physician through anticoagulation clinic    3. Essential hypertension.  --Controlled on current medications    4. Hyperlipidemia.  --Controlled on current medications, lipids due now.     DISPOSITION: Follow-up in 1 year.   Alert and oriented, no focal deficits, no motor or sensory deficits.

## 2021-07-12 NOTE — HISTORY OF PRESENT ILLNESS
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  No significant symptoms of rest pain or tissue loss.

## 2021-07-12 NOTE — ASSESSMENT
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  \par \par duplex shows patent right sfa stent with occluded pop and pta artery, left lower extremity patent sfa and pop stents \par aortoiliac duplex shows patent left maliha and eia stent \par \par Patient is symptoms remain stable.  Continue conservative management.

## 2021-07-14 NOTE — ED PROVIDER NOTE - EKG ADDITIONAL QUESTION - PERFORMED INDEPENDENT VISUALIZATION
General Sunscreen Counseling: I recommended a broad spectrum sunscreen with a SPF of 30 or higher.  I explained that SPF 30 sunscreens block approximately 97 percent of the sun's harmful rays.  Sunscreens should be applied at least 15 minutes prior to expected sun exposure and then every 2 hours after that as long as sun exposure continues. If swimming or exercising sunscreen should be reapplied every 45 minutes to an hour after getting wet or sweating.  One ounce, or the equivalent of a shot glass full of sunscreen, is adequate to protect the skin not covered by a bathing suit. I also recommended a lip balm with a sunscreen as well. Sun protective clothing can be used in lieu of sunscreen but must be worn the entire time you are exposed to the sun's rays.
Products Recommended: Heliocare tablets
Detail Level: Detailed
Yes

## 2021-07-29 ENCOUNTER — APPOINTMENT (OUTPATIENT)
Dept: PULMONOLOGY | Facility: CLINIC | Age: 81
End: 2021-07-29
Payer: MEDICARE

## 2021-07-29 VITALS
OXYGEN SATURATION: 93 % | DIASTOLIC BLOOD PRESSURE: 83 MMHG | TEMPERATURE: 95.1 F | WEIGHT: 215 LBS | HEIGHT: 70 IN | SYSTOLIC BLOOD PRESSURE: 126 MMHG | HEART RATE: 120 BPM | RESPIRATION RATE: 18 BRPM | BODY MASS INDEX: 30.78 KG/M2

## 2021-07-29 DIAGNOSIS — I51.89 OTHER ILL-DEFINED HEART DISEASES: ICD-10-CM

## 2021-07-29 PROCEDURE — 99214 OFFICE O/P EST MOD 30 MIN: CPT

## 2021-07-29 RX ORDER — PREDNISONE 5 MG/1
5 TABLET ORAL
Qty: 120 | Refills: 1 | Status: DISCONTINUED | COMMUNITY
Start: 1900-01-01 | End: 2021-07-29

## 2021-07-29 NOTE — HISTORY OF PRESENT ILLNESS
[FreeTextEntry1] : Mr. 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 He was recently hospitalized at [facility] in May 2021 for asthma/COPD exacerbation with acute on chronic hypercapnic respiratory failure requiring BiPAP. His wife reports that his dyspnea worsened with change of season and exposure to flowers at home. She had to provide BiPAP at home during the day and increased his prednisone from 5->10 mg daily. He was treated with BiPAP, steroids, nebulizers, and levofloxacin with improvement. She continues to administer Mepolizumab every month, next due on 6/25. Remains on prednisone 5 mg daily. He just broke the bridge of his front teeth 2 days ago, went to dentist for repair and has another appointment today. \par \par He has not went for pulmonary rehab. Declining physical therapy at home or pulmonary rehab at this time. Has weights, stationary bike, treadmill, and pool at home, and reports that he will start using. Wife reports that he sleeps from 11:30pm to 9am, then in the afternoon from 1pm to 4-5pm. Will do 1 week trial off montelukast.\par \par Currently reports feeling well, but with change in voice and cough from the humidity. No fevers, chills, chest pain, or wheezing. Dyspnea is at baseline. Denies any cough. He is adherent with Breo & Incruse Ellipta every morning and takes montelukast every night. He also takes azithromycin 250 mg MWF. He is adherent with BiPAP (IPAP 18, EPAP 8) every night from 11pm until 9am. He also wears oxygen during the day occasionally with walking, but wife reports that saturation is up to 95% with going up a flight of stairs. \par \par Note, recent ABG in May 2021 with pH 7.16, pCO2 86, pO2 347, SaO2 99%. Repeat ABG after BiPAP improved to pH 7.40, pCO2 48, pO2 89, SaO2 97%.\par \par Unfortunately his eldest son recently passed away in June 2020 from DM and CHF, so patient and wife are now taking care of grandchildren.\par \par Last 2D-echo in January 2020 with mild diastolic dysfunction, normal LV systolic function, no significant valvular disease, and no pulmonary hypertension (estimated RVSP is 15). \par \par Pulmonary rehab currently on pause due to COVID pandemic, but he is willing to resume. \par \par PFTs (Dec 2020) with severe obstruction. TLC is normal, but RV is increased to 165%. Diffusion capacity is moderately reduced.\par \par Exercise oximetry (Dec 2020) normal oxygen saturation at rest, but developed hypoxemia to 85% while walking at 1.1 MPH requiring 2L NC. \par \par 6MWD (Dec 2020) is 132 meters.\par \par Last CT chest in June 2020 with interval improvement and near resolution of LLL pneumonia with minimal residual atelectasis. There is new linear atelectasis in the RLL. Stable emphysema.\par \par Regarding his smoking history, he quit smoking in the 1980s, used to smoke 1 ppd for 30 years. Was smoking marijuana about 3-4 joints 3x/week until 2017. No alcohol use. No other drug use

## 2021-07-29 NOTE — ASSESSMENT
[FreeTextEntry1] : Mr. 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 He is clinically stable, but is not exercising much and is relatively sedentary. Also with significant daytime somnolence. He was recently hospitalized in May 2021 asthma-COPD exacerbation with acute on chronic hypercapnia requiring BiPAP and ICU admission. Now improved and back to baseline.\par \par 1. 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 There is moderately reduced diffusing capacity\par - ABG in May 2021 during hospitalization initially 7.16/86, improved to 7.40/48 with BiPAP\par - Resolved eosinophilia with mepolizumab and prednisone. IgE previously elevated to 118 in Dec 2019 with allergy testing positive for house dust. Aspergillus IgE negative. Alpha-1 antitrypsin normal and HIV negative\par - Given bronchial hyperreactivity and peripheral eosinophilia, I suspect Asthma-COPD Overlap Syndrome\par - Continue monthly Mepolizumab injections\par - Decrease prednisone to 4 mg daily\par - Continue with Breo Ellipta 200-25 mcg once daily (rinse after use) + Incruse Ellipta 62.5 mcg daily\par - Given EDS, will hold montelukast for 1 week and reassess - his wife Sania will call me\par - Consider switching to cetirizine or fexofenadine if hypersomnolence is related to montelukast\par - Doing well on azithromycin 250 mg MWF\par - Ventolin prn with spacer\par - Keep off roflumilast given no chronic bronchitis symptoms\par - He is declining pulmonary rehab currently\par - We discussed the importance of regular exercise. Recommend daily walking twice daily. Will also use stationary bike, treadmill, and lift light weights. He can resume swimming in the pool in the shallow end with supervision\par - Continue breathing exercises and low carbohydrate diet \par - Reconsider BLVR if continues to experience exacerbations despite increasing exercise\par \par 2. Chronic hypoxemia and hypercapnic respiratory failure:\par - Likely due to asthma/COPD\par - Continue supplemental oxygen with nasal cannula 2-3 LPM with exercise\par - BiPAP every night, IPAP 18, EPAP 8\par - Consider overnight pulse oximetry testing. I also [facility] to [facility] to provide me with data from his BiPAP. Consider repeat in-lab titration\par - 6MWD (Dec 2020) is 148 meters using cane and 2L NC. EOT (Dec 2020) required 2L NC\par \par 3. Abnormal CT Chest\par - CT Chest in June 2020 with interval predominant resolution of the previously seen LLL consolidation with minimal residual atelectasis. New RLL area of linear atelectasis\par - Consider repeat CT chest by end of 2021\par \par 4. Bilateral leg edema:\par - Improved with low salt diet\par - 2D-echo with diastolic dysfunction. LV systolic function normal and there is no significant valvular disease\par - Continue low salt diet, keep legs elevated and compression stockings\par - No evidence of pulmonary hypertension on recent 2D-echo in January 2020. IVC dilated to 2.6 cm, but collapsible on 2D-echo in Sept 2020 during ICU stay (on vent)\par \par 5. Upper airway cough syndrome:\par - Continue fluticasone nasal spray, 1-2 sprays per nostril twice daily\par \par 6. HCM:\par - Up-to-date with pneumococcal and COVID vaccinations\par - Self-injecting Mepolizumab at home\par \par 7. Follow-up in 1-2 months or sooner prn

## 2021-08-02 ENCOUNTER — INPATIENT (INPATIENT)
Facility: HOSPITAL | Age: 81
LOS: 4 days | Discharge: HOME | DRG: 247 | End: 2021-08-07
Attending: INTERNAL MEDICINE | Admitting: INTERNAL MEDICINE
Payer: COMMERCIAL

## 2021-08-02 VITALS
TEMPERATURE: 98 F | RESPIRATION RATE: 20 BRPM | SYSTOLIC BLOOD PRESSURE: 106 MMHG | HEIGHT: 71 IN | DIASTOLIC BLOOD PRESSURE: 62 MMHG | WEIGHT: 250 LBS | OXYGEN SATURATION: 99 % | HEART RATE: 116 BPM

## 2021-08-02 DIAGNOSIS — J44.9 CHRONIC OBSTRUCTIVE PULMONARY DISEASE, UNSPECIFIED: ICD-10-CM

## 2021-08-02 DIAGNOSIS — E11.9 TYPE 2 DIABETES MELLITUS WITHOUT COMPLICATIONS: ICD-10-CM

## 2021-08-02 DIAGNOSIS — E78.5 HYPERLIPIDEMIA, UNSPECIFIED: ICD-10-CM

## 2021-08-02 DIAGNOSIS — I10 ESSENTIAL (PRIMARY) HYPERTENSION: ICD-10-CM

## 2021-08-02 DIAGNOSIS — I25.10 ATHEROSCLEROTIC HEART DISEASE OF NATIVE CORONARY ARTERY WITHOUT ANGINA PECTORIS: ICD-10-CM

## 2021-08-02 DIAGNOSIS — R00.0 TACHYCARDIA, UNSPECIFIED: ICD-10-CM

## 2021-08-02 DIAGNOSIS — Z95.5 PRESENCE OF CORONARY ANGIOPLASTY IMPLANT AND GRAFT: Chronic | ICD-10-CM

## 2021-08-02 DIAGNOSIS — T14.8XXA OTHER INJURY OF UNSPECIFIED BODY REGION, INITIAL ENCOUNTER: Chronic | ICD-10-CM

## 2021-08-02 DIAGNOSIS — N18.30 CHRONIC KIDNEY DISEASE, STAGE 3 UNSPECIFIED: ICD-10-CM

## 2021-08-02 LAB
ALBUMIN SERPL ELPH-MCNC: 4.1 G/DL — SIGNIFICANT CHANGE UP (ref 3.3–5)
ALP SERPL-CCNC: 98 U/L — SIGNIFICANT CHANGE UP (ref 40–120)
ALT FLD-CCNC: 6 U/L — LOW (ref 10–45)
ANION GAP SERPL CALC-SCNC: 12 MMOL/L — SIGNIFICANT CHANGE UP (ref 5–17)
APPEARANCE UR: CLEAR — SIGNIFICANT CHANGE UP
AST SERPL-CCNC: 9 U/L — LOW (ref 10–40)
BACTERIA # UR AUTO: NEGATIVE — SIGNIFICANT CHANGE UP
BASE EXCESS BLDV CALC-SCNC: 1.7 MMOL/L — SIGNIFICANT CHANGE UP (ref -2–2)
BASOPHILS # BLD AUTO: 0.02 K/UL — SIGNIFICANT CHANGE UP (ref 0–0.2)
BASOPHILS NFR BLD AUTO: 0.2 % — SIGNIFICANT CHANGE UP (ref 0–2)
BILIRUB SERPL-MCNC: 0.2 MG/DL — SIGNIFICANT CHANGE UP (ref 0.2–1.2)
BILIRUB UR-MCNC: NEGATIVE — SIGNIFICANT CHANGE UP
BUN SERPL-MCNC: 20 MG/DL — SIGNIFICANT CHANGE UP (ref 7–23)
CA-I SERPL-SCNC: 1.31 MMOL/L — HIGH (ref 1.12–1.3)
CALCIUM SERPL-MCNC: 10.6 MG/DL — HIGH (ref 8.4–10.5)
CHLORIDE BLDV-SCNC: 109 MMOL/L — HIGH (ref 96–108)
CHLORIDE SERPL-SCNC: 103 MMOL/L — SIGNIFICANT CHANGE UP (ref 96–108)
CO2 BLDV-SCNC: 33 MMOL/L — HIGH (ref 22–30)
CO2 SERPL-SCNC: 26 MMOL/L — SIGNIFICANT CHANGE UP (ref 22–31)
COLOR SPEC: SIGNIFICANT CHANGE UP
CREAT SERPL-MCNC: 1.33 MG/DL — HIGH (ref 0.5–1.3)
DIFF PNL FLD: NEGATIVE — SIGNIFICANT CHANGE UP
EOSINOPHIL # BLD AUTO: 0.02 K/UL — SIGNIFICANT CHANGE UP (ref 0–0.5)
EOSINOPHIL NFR BLD AUTO: 0.2 % — SIGNIFICANT CHANGE UP (ref 0–6)
EPI CELLS # UR: 1 /HPF — SIGNIFICANT CHANGE UP
GAS PNL BLDV: 141 MMOL/L — SIGNIFICANT CHANGE UP (ref 135–145)
GAS PNL BLDV: SIGNIFICANT CHANGE UP
GAS PNL BLDV: SIGNIFICANT CHANGE UP
GLUCOSE BLDC GLUCOMTR-MCNC: 192 MG/DL — HIGH (ref 70–99)
GLUCOSE BLDV-MCNC: 171 MG/DL — HIGH (ref 70–99)
GLUCOSE SERPL-MCNC: 180 MG/DL — HIGH (ref 70–99)
GLUCOSE UR QL: ABNORMAL
HCO3 BLDV-SCNC: 30 MMOL/L — HIGH (ref 21–29)
HCT VFR BLD CALC: 42.3 % — SIGNIFICANT CHANGE UP (ref 39–50)
HCT VFR BLDA CALC: 40 % — SIGNIFICANT CHANGE UP (ref 39–50)
HGB BLD CALC-MCNC: 13 G/DL — SIGNIFICANT CHANGE UP (ref 13–17)
HGB BLD-MCNC: 12.3 G/DL — LOW (ref 13–17)
HYALINE CASTS # UR AUTO: 1 /LPF — SIGNIFICANT CHANGE UP (ref 0–2)
IMM GRANULOCYTES NFR BLD AUTO: 0.8 % — SIGNIFICANT CHANGE UP (ref 0–1.5)
KETONES UR-MCNC: ABNORMAL
LACTATE BLDV-MCNC: 1.9 MMOL/L — SIGNIFICANT CHANGE UP (ref 0.7–2)
LEUKOCYTE ESTERASE UR-ACNC: NEGATIVE — SIGNIFICANT CHANGE UP
LYMPHOCYTES # BLD AUTO: 0.89 K/UL — LOW (ref 1–3.3)
LYMPHOCYTES # BLD AUTO: 9.3 % — LOW (ref 13–44)
MCHC RBC-ENTMCNC: 26.8 PG — LOW (ref 27–34)
MCHC RBC-ENTMCNC: 29.1 GM/DL — LOW (ref 32–36)
MCV RBC AUTO: 92.2 FL — SIGNIFICANT CHANGE UP (ref 80–100)
MONOCYTES # BLD AUTO: 0.63 K/UL — SIGNIFICANT CHANGE UP (ref 0–0.9)
MONOCYTES NFR BLD AUTO: 6.6 % — SIGNIFICANT CHANGE UP (ref 2–14)
NEUTROPHILS # BLD AUTO: 7.92 K/UL — HIGH (ref 1.8–7.4)
NEUTROPHILS NFR BLD AUTO: 82.9 % — HIGH (ref 43–77)
NITRITE UR-MCNC: NEGATIVE — SIGNIFICANT CHANGE UP
NRBC # BLD: 0 /100 WBCS — SIGNIFICANT CHANGE UP (ref 0–0)
PCO2 BLDV: 71 MMHG — HIGH (ref 35–50)
PH BLDV: 7.26 — LOW (ref 7.35–7.45)
PH UR: 5.5 — SIGNIFICANT CHANGE UP (ref 5–8)
PLATELET # BLD AUTO: 340 K/UL — SIGNIFICANT CHANGE UP (ref 150–400)
PO2 BLDV: 26 MMHG — SIGNIFICANT CHANGE UP (ref 25–45)
POTASSIUM BLDV-SCNC: 4.4 MMOL/L — SIGNIFICANT CHANGE UP (ref 3.5–5.3)
POTASSIUM SERPL-MCNC: 4.7 MMOL/L — SIGNIFICANT CHANGE UP (ref 3.5–5.3)
POTASSIUM SERPL-SCNC: 4.7 MMOL/L — SIGNIFICANT CHANGE UP (ref 3.5–5.3)
PROT SERPL-MCNC: 6.9 G/DL — SIGNIFICANT CHANGE UP (ref 6–8.3)
PROT UR-MCNC: ABNORMAL
RBC # BLD: 4.59 M/UL — SIGNIFICANT CHANGE UP (ref 4.2–5.8)
RBC # FLD: 13.3 % — SIGNIFICANT CHANGE UP (ref 10.3–14.5)
RBC CASTS # UR COMP ASSIST: 1 /HPF — SIGNIFICANT CHANGE UP (ref 0–4)
SAO2 % BLDV: 31 % — LOW (ref 67–88)
SARS-COV-2 RNA SPEC QL NAA+PROBE: SIGNIFICANT CHANGE UP
SODIUM SERPL-SCNC: 141 MMOL/L — SIGNIFICANT CHANGE UP (ref 135–145)
SP GR SPEC: 1.03 — HIGH (ref 1.01–1.02)
TROPONIN T, HIGH SENSITIVITY RESULT: 36 NG/L — SIGNIFICANT CHANGE UP (ref 0–51)
TROPONIN T, HIGH SENSITIVITY RESULT: 36 NG/L — SIGNIFICANT CHANGE UP (ref 0–51)
UROBILINOGEN FLD QL: NEGATIVE — SIGNIFICANT CHANGE UP
WBC # BLD: 9.56 K/UL — SIGNIFICANT CHANGE UP (ref 3.8–10.5)
WBC # FLD AUTO: 9.56 K/UL — SIGNIFICANT CHANGE UP (ref 3.8–10.5)
WBC UR QL: 1 /HPF — SIGNIFICANT CHANGE UP (ref 0–5)

## 2021-08-02 PROCEDURE — 93010 ELECTROCARDIOGRAM REPORT: CPT

## 2021-08-02 PROCEDURE — 99291 CRITICAL CARE FIRST HOUR: CPT

## 2021-08-02 PROCEDURE — 76937 US GUIDE VASCULAR ACCESS: CPT | Mod: 26

## 2021-08-02 PROCEDURE — 71046 X-RAY EXAM CHEST 2 VIEWS: CPT | Mod: 26

## 2021-08-02 PROCEDURE — 36000 PLACE NEEDLE IN VEIN: CPT

## 2021-08-02 RX ORDER — DEXTROSE 50 % IN WATER 50 %
12.5 SYRINGE (ML) INTRAVENOUS ONCE
Refills: 0 | Status: DISCONTINUED | OUTPATIENT
Start: 2021-08-02 | End: 2021-08-07

## 2021-08-02 RX ORDER — METOPROLOL TARTRATE 50 MG
12.5 TABLET ORAL
Refills: 0 | Status: DISCONTINUED | OUTPATIENT
Start: 2021-08-02 | End: 2021-08-03

## 2021-08-02 RX ORDER — GLUCAGON INJECTION, SOLUTION 0.5 MG/.1ML
1 INJECTION, SOLUTION SUBCUTANEOUS ONCE
Refills: 0 | Status: DISCONTINUED | OUTPATIENT
Start: 2021-08-02 | End: 2021-08-07

## 2021-08-02 RX ORDER — TAMSULOSIN HYDROCHLORIDE 0.4 MG/1
0.4 CAPSULE ORAL AT BEDTIME
Refills: 0 | Status: DISCONTINUED | OUTPATIENT
Start: 2021-08-02 | End: 2021-08-07

## 2021-08-02 RX ORDER — SODIUM CHLORIDE 9 MG/ML
1000 INJECTION, SOLUTION INTRAVENOUS
Refills: 0 | Status: DISCONTINUED | OUTPATIENT
Start: 2021-08-02 | End: 2021-08-07

## 2021-08-02 RX ORDER — INSULIN LISPRO 100/ML
VIAL (ML) SUBCUTANEOUS AT BEDTIME
Refills: 0 | Status: DISCONTINUED | OUTPATIENT
Start: 2021-08-02 | End: 2021-08-07

## 2021-08-02 RX ORDER — LOSARTAN POTASSIUM 100 MG/1
25 TABLET, FILM COATED ORAL DAILY
Refills: 0 | Status: DISCONTINUED | OUTPATIENT
Start: 2021-08-02 | End: 2021-08-07

## 2021-08-02 RX ORDER — ASPIRIN/CALCIUM CARB/MAGNESIUM 324 MG
81 TABLET ORAL DAILY
Refills: 0 | Status: DISCONTINUED | OUTPATIENT
Start: 2021-08-02 | End: 2021-08-03

## 2021-08-02 RX ORDER — HEPARIN SODIUM 5000 [USP'U]/ML
5000 INJECTION INTRAVENOUS; SUBCUTANEOUS EVERY 12 HOURS
Refills: 0 | Status: DISCONTINUED | OUTPATIENT
Start: 2021-08-02 | End: 2021-08-03

## 2021-08-02 RX ORDER — BUDESONIDE AND FORMOTEROL FUMARATE DIHYDRATE 160; 4.5 UG/1; UG/1
2 AEROSOL RESPIRATORY (INHALATION)
Refills: 0 | Status: DISCONTINUED | OUTPATIENT
Start: 2021-08-02 | End: 2021-08-07

## 2021-08-02 RX ORDER — DEXTROSE 50 % IN WATER 50 %
15 SYRINGE (ML) INTRAVENOUS ONCE
Refills: 0 | Status: DISCONTINUED | OUTPATIENT
Start: 2021-08-02 | End: 2021-08-07

## 2021-08-02 RX ORDER — IPRATROPIUM/ALBUTEROL SULFATE 18-103MCG
3 AEROSOL WITH ADAPTER (GRAM) INHALATION EVERY 6 HOURS
Refills: 0 | Status: DISCONTINUED | OUTPATIENT
Start: 2021-08-02 | End: 2021-08-03

## 2021-08-02 RX ORDER — DEXTROSE 50 % IN WATER 50 %
25 SYRINGE (ML) INTRAVENOUS ONCE
Refills: 0 | Status: DISCONTINUED | OUTPATIENT
Start: 2021-08-02 | End: 2021-08-07

## 2021-08-02 RX ORDER — CLOPIDOGREL BISULFATE 75 MG/1
75 TABLET, FILM COATED ORAL DAILY
Refills: 0 | Status: DISCONTINUED | OUTPATIENT
Start: 2021-08-02 | End: 2021-08-07

## 2021-08-02 RX ORDER — ATORVASTATIN CALCIUM 80 MG/1
20 TABLET, FILM COATED ORAL AT BEDTIME
Refills: 0 | Status: DISCONTINUED | OUTPATIENT
Start: 2021-08-02 | End: 2021-08-07

## 2021-08-02 RX ORDER — INSULIN LISPRO 100/ML
VIAL (ML) SUBCUTANEOUS
Refills: 0 | Status: DISCONTINUED | OUTPATIENT
Start: 2021-08-02 | End: 2021-08-07

## 2021-08-02 RX ADMIN — TAMSULOSIN HYDROCHLORIDE 0.4 MILLIGRAM(S): 0.4 CAPSULE ORAL at 22:46

## 2021-08-02 RX ADMIN — ATORVASTATIN CALCIUM 20 MILLIGRAM(S): 80 TABLET, FILM COATED ORAL at 22:46

## 2021-08-02 NOTE — ED PROVIDER NOTE - OBJECTIVE STATEMENT
81M extensive cardiac history including a prior MI, CAD w/ stents, COPD, HTN, HLD, lower extremity stents presents to ED for a fast heart rate while at the urologist for difficulty with urination. Pt states they said his heart was very high, was brought to the ER for evaluation but was otherwise feeling fine, pt denies any recent illness but according to wife in room he has been more short of breath with exertion recently. Pt states he is currently feeling well. 81M extensive cardiac history including a prior MI, CAD w/ stents, COPD, HTN, HLD, lower extremity stents presents to ED for a fast heart rate while at the urologist for difficulty with urination. Pt states they said his heart was very high, was brought to the ER for evaluation but was otherwise feeling fine, pt denies any recent illness but according to wife in room he has been more short of breath with exertion recently. Pt states he is currently feeling well.    Cardiologist: Dr Pallavi Miller 81M extensive cardiac history including a prior MI, CAD w/ stents, COPD, HTN, HLD, lower extremity stents presents to ED for a fast heart rate while at the urologist for difficulty with urination. Pt states they said his heart was very high, was brought to the ER for evaluation but was otherwise feeling fine, pt denies any recent illness but according to wife in room he has been more short of breath with exertion recently. Pt states he is currently feeling well.    Cardiologist: Dr Pallavi Miller  [facility]  [phone]

## 2021-08-02 NOTE — ED ADULT NURSE REASSESSMENT NOTE - NS ED NURSE REASSESS COMMENT FT1
pt recived from [facility] Trin[facility]. on bedside report pt HR increased back to 160s- ekg performed and cards MD at bedside. Pt. denies SOB, chest pain, difficulty breathing.

## 2021-08-02 NOTE — H&P ADULT - ASSESSMENT
81M extensive cardiac history including a prior MI, CAD w/ stents, COPD, HTN, HLD, lower extremity stents presents to ED for a fast heart rate while at the urologist for difficulty with urination. Pt states they said his heart was very high, was brought to the ER for evaluation but was otherwise feeling fine, pt denies any recent illness but according to wife in room he has been more short of breath with exertion recently. Pt states he is currently feeling well.

## 2021-08-02 NOTE — ED PROVIDER NOTE - ATTENDING CONTRIBUTION TO CARE
82 yo male complex PMH as noted including prior hypercapnic respiratory failure referred to ED from urology office for tachycardia.  on my assessment, patient had runs of ?rapid a.fib to the 160s during my assessment, followed by rapid return to sinus tachycardia in the low 100s.  EKG only captured sinus tachycardia.  per wife remote history of prior tachydysrhythmia but cannot elucidate further.  remain on cardiac monitor, check labs, cardiac enzymes, defer IV calcium channel or beta blocker emergently has patient has been spontaneously returning to sinus rhythm, cardiology evaluation, telemetry admission for further management.

## 2021-08-02 NOTE — CONSULT NOTE ADULT - ASSESSMENT
81M extensive cardiac history including a prior MI, CAD w/ stents, COPD, HTN, HLD, lower extremity stents presents to ED for a fast heart rate while at the urologist for difficulty with urination. Pt states they said his heart was very high, was brought to the ER for evaluation but was otherwise feeling fine, pt denies any recent illness but according to wife in room he has been more short of breath with exertion recently. Pt states he is currently feeling well.    palpitations: tachycardia:  copd on home oxygen:   CAD? MI  HTN:  HLD  dm  ckd    8/2:      palpitations: tachycardia: NSR: seen by EP: follow recommendations:   copd on home oxygen: add Symbicort no wheezing he si already on home oxygen: venous pg is acidotic: repeat ABG in am  : no need for bipap tonight: he looks comfortable:   CAD? MI: per cards: repeat echo    HTN: controlleD:   HLD  dm : monitor and c ontrol   ckd : cont to monitor  dw team

## 2021-08-02 NOTE — ED ADULT NURSE NOTE - OBJECTIVE STATEMENT
80y/o female aaox3 h/o copd on depended on 2 lts O2 presents to the ED via EMS from MDs office  c/o diaphoresis, tachycardic, as per EMS pt had an appointment w/ podiatry today, while he was at Mds office he had an episode  of diaphoresis and tachycardia , Mds office called EMS for bringing in Pt to ED  for evaluation, Pt at this time  denies fever, chills, n/v, weakness, abd pain, diarrhea/constipation, numbness/tingling, urinary symptoms , in no respiratory distress, no CP, PT safety maintained with family at bedside, call bell within reach and bed in the lowest position.

## 2021-08-02 NOTE — ED PROVIDER NOTE - PHYSICAL EXAMINATION
CONSTITUTIONAL: well-appearing, in No acute distress., obese  SKIN: Warm dry, normal skin turgor  HEAD: NCAT  EYES: EOMI, PERRLA, no scleral icterus, conjunctiva pink  ENT: normal pharynx with no erythema or exudates  NECK: Supple; non tender. Full ROM.  CARD: tachycardic to 140, no murmurs.  RESP: clear to ausculation b/l. No crackles or wheezing.  ABD: soft, non-tender, non-distended, no rebound or guarding.  EXT: 1+ pedal edema, no calf tenderness  PSYCH: Cooperative, appropriate.

## 2021-08-02 NOTE — CONSULT NOTE ADULT - SUBJECTIVE AND OBJECTIVE BOX
Cardiology    HISTORY OF PRESENT ILLNESS:   Mr gloria is a 81yoM referred from Dr Rod office with weakness/unsteady gait and profuse sweating.  Sent to ED for further workup.  He has severe COPD, requiring intermittent supplemental oxygen (2L NC), history of CABG ~20yrs ago, coronary stenting, and also above-the-knee vascular stenting done in 2019.  He states he has had no fevers, chills, blurry vision, orthopnea, angina, palpitations, worsening SOB, dizziness and fainting, worsening of leg pain, burning with urination, nausea, vomiting, change in appetite or diarrhea.  A 10 pt ROS is otherwise negative.      PAST MEDICAL & SURGICAL HISTORY:  Diabetes Mellitus, Type II    CAD (Coronary Artery Disease)  s/p 3v CABG ; stents placed in [place] in     Dyslipidemia    Osteomyelitis    COPD (chronic obstructive pulmonary disease)  on 2L at home and BiPAP at night; intubated     Hypertension    PVD (peripheral vascular disease)    CABG (Coronary Artery Bypass Graft)      Compound fracture  left leg    S/P primary angioplasty with coronary stent    MEDICATIONS  (STANDING):      Allergies    No Known Allergies    Intolerances    shellfish (Nausea)    FAMILY HISTORY:  Family history of diabetes mellitus (Sibling)    Family hx of lung cancer  brother,  age 82, used to smoke with pt    Non-contributary for premature coronary disease or sudden cardiac death    SOCIAL HISTORY:    [ x] Non-smoker  [ ] Smoker  [ ] Alcohol    PHYSICAL EXAM:  T(C): 36.8 (21 @ 14:11), Max: 36.8 (21 @ 14:11)  HR: 119 (21 @ 14:11) (116 - 119)  BP: 140/79 (21 @ 14:11) (106/62 - 140/79)  RR: 24 (21 @ 14:11) (20 - 24)  SpO2: 100% (21 @ 14:11) (99% - 100%)  Wt(kg): --    Appearance: well appearing AA male in no acute distress, pleasant and in good spirits  HEENT:   Normal oral mucosa, PERRL, EOMI	  Lymphatic: No lymphadenopathy , no edema  Cardiovascular: Normal S1 S2, No JVD, No murmurs , Peripheral pulses palpable 2+ bilaterally  Respiratory: Lungs clear to auscultation but with poor air entry BL, normal effort 	  Gastrointestinal:  Soft, Non-tender, + BS	  Skin: No rashes, No ecchymoses, No cyanosis, cool to touch, 1+ posterior tibialis pulses BL, radial arteries are 2+BL  Musculoskeletal: Normal range of motion, normal strength  Psychiatry:  Mood & affect appropriate    TELEMETRY: NSR, QRS-alternans. 	    ECG: NSR 	  Echo: bedside limited echo with no pericardial effusion (ED staff)  	  LABS:	 	  drawn and pending    ASSESSMENT/PLAN: 	81y Male with COPD, CAD and PAD, here with profuse sweating and gait instability.      At present time, he appears stable and in no acute distress.  Labwork is pending.  Pericardial effusion ruled-out at bedside via echo.  Recommend screening bloodwork for ACS and infection.  Recommend formal echocardioam.  Will follow.      Sergio Payton M.D.  Cardiac Electrophysiology    office [phone]  pager [phone] Cardiology    HISTORY OF PRESENT ILLNESS:   Mr gloria is a 81yoM referred from Dr Rod office with weakness/unsteady gait and profuse sweating.  Sent to ED for further workup.  He has severe COPD, requiring intermittent supplemental oxygen (2L NC), history of CABG ~20yrs ago, coronary stenting, and also above-the-knee vascular stenting done in 2019.  He states he has had no fevers, chills, blurry vision, orthopnea, angina, palpitations, worsening SOB, dizziness and fainting, worsening of leg pain, burning with urination, nausea, vomiting, change in appetite or diarrhea.  A 10 pt ROS is otherwise negative.      PAST MEDICAL & SURGICAL HISTORY:  Diabetes Mellitus, Type II    CAD (Coronary Artery Disease)  s/p 3v CABG ; stents placed in [place] in     Dyslipidemia    Osteomyelitis    COPD (chronic obstructive pulmonary disease)  on 2L at home and BiPAP at night; intubated     Hypertension    PVD (peripheral vascular disease)    CABG (Coronary Artery Bypass Graft)      Compound fracture  left leg    S/P primary angioplasty with coronary stent    MEDICATIONS  (STANDING):      Allergies    No Known Allergies    Intolerances    shellfish (Nausea)    FAMILY HISTORY:  Family history of diabetes mellitus (Sibling)    Family hx of lung cancer  brother,  age 82, used to smoke with pt    Non-contributary for premature coronary disease or sudden cardiac death    SOCIAL HISTORY:    [ x] Non-smoker  [ ] Smoker  [ ] Alcohol    PHYSICAL EXAM:  T(C): 36.8 (21 @ 14:11), Max: 36.8 (21 @ 14:11)  HR: 119 (21 @ 14:11) (116 - 119)  BP: 140/79 (21 @ 14:11) (106/62 - 140/79)  RR: 24 (21 @ 14:11) (20 - 24)  SpO2: 100% (21 @ 14:11) (99% - 100%)  Wt(kg): --    Appearance: well appearing AA male in no acute distress, pleasant and in good spirits  HEENT:   Normal oral mucosa, PERRL, EOMI	  Lymphatic: No lymphadenopathy , no edema  Cardiovascular: Normal S1 S2, No JVD, No murmurs , Peripheral pulses palpable 2+ bilaterally  Respiratory: Lungs clear to auscultation but with poor air entry BL, normal effort 	  Gastrointestinal:  Soft, Non-tender, + BS	  Skin: No rashes, No ecchymoses, No cyanosis, cool to touch, 1+ posterior tibialis pulses BL, radial arteries are 2+BL  Musculoskeletal: Normal range of motion, normal strength  Psychiatry:  Mood & affect appropriate    TELEMETRY: NSR, QRS-alternans. 	    ECG: NSR 	  Echo: bedside limited echo with no pericardial effusion (ED staff)  	  LABS:	 	  drawn and pending    ASSESSMENT/PLAN: 	81y Male with COPD, CAD and PAD, here with profuse sweating and gait instability.      At present time, he appears stable and in no acute distress.  Labwork is pending.  Pericardial effusion ruled-out at bedside via echo.  Recommend screening bloodwork for ACS and infection.  Recommend formal echocardioam.  Will follow.    ADDENDUM:  called in afternoon re: tachycardia to 165-170bpm, sudden onset and offset.  EKG reviewed, consistent with SVT.    Sergio Payton M.D.  Cardiac Electrophysiology    office [phone]  pager [phone]

## 2021-08-02 NOTE — ED PROVIDER NOTE - SHIFT CHANGE DETAILS
Attending MD Diaz: 81M sent in from uro for HR 160s, COPD, DM, CAD s/p CABG, ?rapid HR at [facility] 1.5 yrs ago, noted HR 160s then, unclear etiology

## 2021-08-02 NOTE — ED PROVIDER NOTE - PROGRESS NOTE DETAILS
Attending MD Diaz: MD informed patient HR in 160s, asked for repeat EKG, at time of EKG repeat,  Attending MD Diaz: Had repeat incident with Dr. Mendenhall at bedside but spontaneous return to sinus tach

## 2021-08-02 NOTE — CONSULT NOTE ADULT - SUBJECTIVE AND OBJECTIVE BOX
21 @ 20:20    Patient is a 81y old  Male who presents with a chief complaint of Fast Heart Rate (02 Aug 2021 20:03)      HPI:  81M extensive cardiac history including a prior MI, CAD w/ stents, COPD, HTN, HLD, lower extremity stents presents to ED for a fast heart rate while at the urologist for difficulty with urination. Pt states they said his heart was very high, was brought to the ER for evaluation but was otherwise feeling fine, pt denies any recent illness but according to wife in room he has been more short of breath with exertion recently. Pt states he is currently feeling well. (02 Aug 2021 20:03)    he said when at MD office they were drawing blood on him he started sweating a lot  with increasing heart rateand hence as sent here       ?FOLLOWING PRESENT  [ x] Hx of PE/DVT, [ y] Hx COPD, [ x] Hx of Asthma, [x ] Hx of Hospitalization, [ ]x  Hx of BiPAP/CPAP use, [x ] Hx of COURTNEY    Allergies    No Known Allergies    Intolerances    shellfish (Nausea)      PAST MEDICAL & SURGICAL HISTORY:  Diabetes Mellitus, Type II    CAD (Coronary Artery Disease)  s/p 3v CABG ; stents placed [place]    Dyslipidemia    Osteomyelitis    COPD (chronic obstructive pulmonary disease)  on 2L at home and BiPAP at night; intubated     Hypertension    PVD (peripheral vascular disease)    CABG (Coronary Artery Bypass Graft)      Compound fracture  left leg    S/P primary angioplasty with coronary stent        FAMILY HISTORY:  Family history of diabetes mellitus (Sibling)    Family hx of lung cancer  brother,  age 82, used to smoke with pt        Social History: [  25 pk years] TOBACCO                  [  x] ETOH                                 [ x ] IVDA/DRUGS    REVIEW OF SYSTEMS      General:	Increased sweating    Skin/Breast:x  	  Ophthalmologic:x  	  ENMT:	x    Respiratory and Thorax: x  	  Cardiovascular:	palpitations    Gastrointestinal:	x    Genitourinary:	x    Musculoskeletal:	x    Neurological:	x    Psychiatric:	x    Hematology/Lymphatics:	x    Endocrine:	x    Allergic/Immunologic:	x    MEDICATIONS  (STANDING):  dextrose 40% Gel 15 Gram(s) Oral once  dextrose 5%. 1000 milliLiter(s) (50 mL/Hr) IV Continuous <Continuous>  dextrose 5%. 1000 milliLiter(s) (100 mL/Hr) IV Continuous <Continuous>  dextrose 50% Injectable 25 Gram(s) IV Push once  dextrose 50% Injectable 12.5 Gram(s) IV Push once  dextrose 50% Injectable 25 Gram(s) IV Push once  glucagon  Injectable 1 milliGRAM(s) IntraMuscular once  insulin lispro (ADMELOG) corrective regimen sliding scale   SubCutaneous three times a day before meals  insulin lispro (ADMELOG) corrective regimen sliding scale   SubCutaneous at bedtime    MEDICATIONS  (PRN):       Vital Signs Last 24 Hrs  T(C): 37 (02 Aug 2021 20:03), Max: 37 (02 Aug 2021 19:37)  T(F): 98.6 (02 Aug 2021 20:03), Max: 98.6 (02 Aug 2021 19:37)  HR: 102 (02 Aug 2021 20:03) (99 - 119)  BP: 116/95 (02 Aug 2021 20:03) (106/62 - 140/79)  BP(mean): 102 (02 Aug 2021 20:03) (102 - 102)  RR: 21 (02 Aug 2021 19:37) (20 - 24)  SpO2: 99% (02 Aug 2021 19:37) (99% - 100%)Orthostatic VS          I&O's Summary      Physical Exam:   GENERAL: NAD, well-groomed, well-developed  HEENT: VIOLETA/   Atraumatic, Normocephalic  ENMT: No tonsillar erythema, exudates, or enlargement; Moist mucous membranes, Good dentition, No lesions  NECK: Supple, No JVD, Normal thyroid  CHEST/LUNG: Clear to auscultation bilaterall-  CVS: Regular rate and rhythm; No murmurs, rubs, or gallops  GI: : Soft, Nontender, Nondistended; Bowel sounds present  NERVOUS SYSTEM:  Alert & Oriented X3  EXTREMITIES:  - edema  LYMPH: No lymphadenopathy noted  SKIN: No rashes or lesions  ENDOCRINOLOGY: No Thyromegaly  PSYCH: Appropriate    Labs:  Venous<71<4>>26<<7.265>>Venous<<3><<4><<5<<269>>COVID-19 PCR: NotDetec (02 Aug 2021 16:11)  SARS-CoV-2: NotDetec (17 May 2021 09:53)                              12.3   9.56  )-----------( 340      ( 02 Aug 2021 15:26 )             42.3     08-02    141  |  103  |  20  ----------------------------<  180<H>  4.7   |  26  |  1.33<H>    Ca    10.6<H>      02 Aug 2021 15:26    TPro  6.9  /  Alb  4.1  /  TBili  0.2  /  DBili  x   /  AST  9<L>  /  ALT  6<L>  /  AlkPhos  98      CAPILLARY BLOOD GLUCOSE      POCT Blood Glucose.: 154 mg/dL (02 Aug 2021 17:49)    LIVER FUNCTIONS - ( 02 Aug 2021 15:26 )  Alb: 4.1 g/dL / Pro: 6.9 g/dL / ALK PHOS: 98 U/L / ALT: 6 U/L / AST: 9 U/L / GGT: x             Urinalysis Basic - ( 02 Aug 2021 16:26 )    Color: Light Yellow / Appearance: Clear / S.032 / pH: x  Gluc: x / Ketone: Small  / Bili: Negative / Urobili: Negative   Blood: x / Protein: Trace / Nitrite: Negative   Leuk Esterase: Negative / RBC: 1 /hpf / WBC 1 /HPF   Sq Epi: x / Non Sq Epi: 1 /hpf / Bacteria: Negative      D DImer      Studies  Chest X-RAY  CT SCAN Chest   CT Abdomen  Venous Dopplers: LE:   Others          < from: Xray Chest 2 Views PA/Lat (21 @ 15:17) >  EXAM:  XR CHEST PA LAT 2V                            PROCEDURE DATE:  2021            INTERPRETATION:  Chest two view    HISTORY: Chest pain    COMPARISON: 2020    Radiographic examination shows the heart to be small in size. The lungs are hyperinflated but clear. Sternal wires are again noted.    There is no evidence of focal infiltrate, pneumothorax or pleural effusion.    IMPRESSION: COPD.    Thank you for this referral.      --- End of Report ---                KAREN SHARMA MD; Attending Interventional Radiologist  This document has been electronically signed. Aug  2 2021  3:39PM    < end of copied text >  < from: CT Chest No Cont (20 @ 13:34) >  PROCEDURE DATE:  2020           INTERPRETATION:  CLINICAL INDICATION: Evaluate for resolution of left lower lobe pneumonia.    Axial CT images of the chest are obtained without intravenous administration of contrast.    No enlarged axillary, mediastinal or hilar lymph nodes.    No pericardial effusion. Heart size is normal. Status post CABG. Atherosclerotic disease of the aorta. No pleural effusions. Interval resolution of the previously seen left pleural effusion.    Evaluation of the upper abdomen demonstrate no significant abnormality.    Evaluation of the lungs demonstrate emphysema. Interval significant improvement or predominant resolution of the previously seen left lower lobe consolidation with minimal residual groundglass which may be due to subsegmental atelectasis.     Right lower lobe linear opacity representing atelectasis is new since the prior study.    Trace secretions within the trachea.    Degenerative changes of the spine. Sternotomy wires.    IMPRESSION: Interval predominant resolution of the previously seen left lower lobe consolidation with minimal residual atelectasis.    Right lower lobe area of linear atelectasis new since the prior study.    Emphysema.        < from: TTE Echo Complete w/o Contrast w/ Doppler (20 @ 11:33) >  : Unable to assess in the absence of a clean tricuspid jet envelope    FINDINGS  Left Ventricle: The endocardium is not well-visualized. In very limited views, there appears to be preserved left ventricular systolic function. Cannot rule out segmental wall motion abnormalities.  Aortic Valve: The aortic valve is not well-visualized. It appears sclerotic with normal opening.  Mitral Valve: The mitral valve is not well-visualized, though appears to be normal with appropriate diastolic opening  Tricuspid Valve: Not well-visualized  Pulmonic Valve: Not well-visualized  Left Atrium: Grossly normal  Right Ventricle: The right ventricle is not well-visualized. Unable to evaluate right ventricular size or systolic function.  Right Atrium: Not well-visualized  Diastolic Function: Doppler evidence of grade 1 diastolic dysfunction  Pericardium/Pleura: No significant pericardial effusion  The IVC appears dilated at 2.6 cm, though collapses >50% with respiration.      CONCLUSIONS:  1. Very technically difficult and limited study in the intensive care unit.  2. The endocardium is not well-visualized. In very limited views, there appears to be preserved left ventricular systolic function. Cannot rule out segmental wall motion abnormalities.  3. Doppler evidence of grade 1 diastolic dysfunction  4. The IVC appears dilated at 2.6 cm, though collapses >50% with respiration.  5. No significant pericardial effusion              REJI VILA   This document has been electronically signed. Sep 25 2020 12:40PM    < end of copied text >             VIJAYA ANDERSEN M.D., ATTENDING RADIOLOGIST   This document has been electronically signed. 2020  1:44PM    < end of copied text >

## 2021-08-02 NOTE — ED PROVIDER NOTE - CLINICAL SUMMARY MEDICAL DECISION MAKING FREE TEXT BOX
Recurrent tachycardia that self converts, consider acute cause of afib w/ rvr vs sinus tachycardia, labs r/o infectious source, likely CDU vs admit for cardiology workup.

## 2021-08-03 LAB
A1C WITH ESTIMATED AVERAGE GLUCOSE RESULT: 7.7 % — HIGH (ref 4–5.6)
ANION GAP SERPL CALC-SCNC: 10 MMOL/L — SIGNIFICANT CHANGE UP (ref 5–17)
ANION GAP SERPL CALC-SCNC: 11 MMOL/L — SIGNIFICANT CHANGE UP (ref 5–17)
BASE EXCESS BLDA CALC-SCNC: 3.1 MMOL/L — HIGH (ref -2–2)
BUN SERPL-MCNC: 19 MG/DL — SIGNIFICANT CHANGE UP (ref 7–23)
BUN SERPL-MCNC: 24 MG/DL — HIGH (ref 7–23)
CALCIUM SERPL-MCNC: 10.1 MG/DL — SIGNIFICANT CHANGE UP (ref 8.4–10.5)
CALCIUM SERPL-MCNC: 10.3 MG/DL — SIGNIFICANT CHANGE UP (ref 8.4–10.5)
CHLORIDE SERPL-SCNC: 100 MMOL/L — SIGNIFICANT CHANGE UP (ref 96–108)
CHLORIDE SERPL-SCNC: 106 MMOL/L — SIGNIFICANT CHANGE UP (ref 96–108)
CHOLEST SERPL-MCNC: 125 MG/DL — SIGNIFICANT CHANGE UP
CO2 BLDA-SCNC: 29 MMOL/L — SIGNIFICANT CHANGE UP (ref 22–30)
CO2 SERPL-SCNC: 27 MMOL/L — SIGNIFICANT CHANGE UP (ref 22–31)
CO2 SERPL-SCNC: 27 MMOL/L — SIGNIFICANT CHANGE UP (ref 22–31)
COVID-19 SPIKE DOMAIN AB INTERP: POSITIVE
COVID-19 SPIKE DOMAIN ANTIBODY RESULT: >250 U/ML — HIGH
CREAT SERPL-MCNC: 1.2 MG/DL — SIGNIFICANT CHANGE UP (ref 0.5–1.3)
CREAT SERPL-MCNC: 1.24 MG/DL — SIGNIFICANT CHANGE UP (ref 0.5–1.3)
ESTIMATED AVERAGE GLUCOSE: 174 MG/DL — HIGH (ref 68–114)
GAS PNL BLDA: SIGNIFICANT CHANGE UP
GLUCOSE BLDC GLUCOMTR-MCNC: 141 MG/DL — HIGH (ref 70–99)
GLUCOSE BLDC GLUCOMTR-MCNC: 164 MG/DL — HIGH (ref 70–99)
GLUCOSE BLDC GLUCOMTR-MCNC: 182 MG/DL — HIGH (ref 70–99)
GLUCOSE BLDC GLUCOMTR-MCNC: 208 MG/DL — HIGH (ref 70–99)
GLUCOSE BLDC GLUCOMTR-MCNC: 238 MG/DL — HIGH (ref 70–99)
GLUCOSE BLDC GLUCOMTR-MCNC: 344 MG/DL — HIGH (ref 70–99)
GLUCOSE SERPL-MCNC: 144 MG/DL — HIGH (ref 70–99)
GLUCOSE SERPL-MCNC: 227 MG/DL — HIGH (ref 70–99)
HCO3 BLDA-SCNC: 28 MMOL/L — SIGNIFICANT CHANGE UP (ref 21–29)
HDLC SERPL-MCNC: 46 MG/DL — SIGNIFICANT CHANGE UP
HOROWITZ INDEX BLDA+IHG-RTO: 28 — SIGNIFICANT CHANGE UP
LIPID PNL WITH DIRECT LDL SERPL: 52 MG/DL — SIGNIFICANT CHANGE UP
MAGNESIUM SERPL-MCNC: 1.8 MG/DL — SIGNIFICANT CHANGE UP (ref 1.6–2.6)
NON HDL CHOLESTEROL: 79 MG/DL — SIGNIFICANT CHANGE UP
PCO2 BLDA: 46 MMHG — SIGNIFICANT CHANGE UP (ref 32–46)
PH BLDA: 7.4 — SIGNIFICANT CHANGE UP (ref 7.35–7.45)
PHOSPHATE SERPL-MCNC: 3.2 MG/DL — SIGNIFICANT CHANGE UP (ref 2.5–4.5)
PO2 BLDA: 106 MMHG — SIGNIFICANT CHANGE UP (ref 74–108)
POTASSIUM SERPL-MCNC: 4.1 MMOL/L — SIGNIFICANT CHANGE UP (ref 3.5–5.3)
POTASSIUM SERPL-MCNC: 4.2 MMOL/L — SIGNIFICANT CHANGE UP (ref 3.5–5.3)
POTASSIUM SERPL-SCNC: 4.1 MMOL/L — SIGNIFICANT CHANGE UP (ref 3.5–5.3)
POTASSIUM SERPL-SCNC: 4.2 MMOL/L — SIGNIFICANT CHANGE UP (ref 3.5–5.3)
SAO2 % BLDA: 98 % — HIGH (ref 92–96)
SARS-COV-2 IGG+IGM SERPL QL IA: >250 U/ML — HIGH
SARS-COV-2 IGG+IGM SERPL QL IA: POSITIVE
SODIUM SERPL-SCNC: 138 MMOL/L — SIGNIFICANT CHANGE UP (ref 135–145)
SODIUM SERPL-SCNC: 143 MMOL/L — SIGNIFICANT CHANGE UP (ref 135–145)
T4 FREE SERPL-MCNC: 1.3 NG/DL — SIGNIFICANT CHANGE UP (ref 0.9–1.8)
TRIGL SERPL-MCNC: 133 MG/DL — SIGNIFICANT CHANGE UP
TSH SERPL-MCNC: 1.54 UIU/ML — SIGNIFICANT CHANGE UP (ref 0.27–4.2)

## 2021-08-03 PROCEDURE — 93306 TTE W/DOPPLER COMPLETE: CPT | Mod: 26

## 2021-08-03 PROCEDURE — 93010 ELECTROCARDIOGRAM REPORT: CPT

## 2021-08-03 RX ORDER — METOPROLOL TARTRATE 50 MG
12.5 TABLET ORAL ONCE
Refills: 0 | Status: COMPLETED | OUTPATIENT
Start: 2021-08-03 | End: 2021-08-03

## 2021-08-03 RX ORDER — TIOTROPIUM BROMIDE 18 UG/1
1 CAPSULE ORAL; RESPIRATORY (INHALATION) DAILY
Refills: 0 | Status: DISCONTINUED | OUTPATIENT
Start: 2021-08-03 | End: 2021-08-07

## 2021-08-03 RX ORDER — METOPROLOL TARTRATE 50 MG
25 TABLET ORAL
Refills: 0 | Status: DISCONTINUED | OUTPATIENT
Start: 2021-08-03 | End: 2021-08-04

## 2021-08-03 RX ORDER — APIXABAN 2.5 MG/1
5 TABLET, FILM COATED ORAL
Refills: 0 | Status: DISCONTINUED | OUTPATIENT
Start: 2021-08-03 | End: 2021-08-05

## 2021-08-03 RX ORDER — MAGNESIUM SULFATE 500 MG/ML
1 VIAL (ML) INJECTION ONCE
Refills: 0 | Status: COMPLETED | OUTPATIENT
Start: 2021-08-03 | End: 2021-08-03

## 2021-08-03 RX ORDER — LEVALBUTEROL 1.25 MG/.5ML
0.63 SOLUTION, CONCENTRATE RESPIRATORY (INHALATION) EVERY 8 HOURS
Refills: 0 | Status: DISCONTINUED | OUTPATIENT
Start: 2021-08-03 | End: 2021-08-07

## 2021-08-03 RX ADMIN — BUDESONIDE AND FORMOTEROL FUMARATE DIHYDRATE 2 PUFF(S): 160; 4.5 AEROSOL RESPIRATORY (INHALATION) at 18:13

## 2021-08-03 RX ADMIN — Medication 2: at 07:32

## 2021-08-03 RX ADMIN — ATORVASTATIN CALCIUM 20 MILLIGRAM(S): 80 TABLET, FILM COATED ORAL at 21:58

## 2021-08-03 RX ADMIN — Medication 100 GRAM(S): at 23:44

## 2021-08-03 RX ADMIN — Medication 4 MILLIGRAM(S): at 06:45

## 2021-08-03 RX ADMIN — Medication 81 MILLIGRAM(S): at 06:46

## 2021-08-03 RX ADMIN — LOSARTAN POTASSIUM 25 MILLIGRAM(S): 100 TABLET, FILM COATED ORAL at 07:32

## 2021-08-03 RX ADMIN — APIXABAN 5 MILLIGRAM(S): 2.5 TABLET, FILM COATED ORAL at 18:14

## 2021-08-03 RX ADMIN — Medication 4: at 11:37

## 2021-08-03 RX ADMIN — CLOPIDOGREL BISULFATE 75 MILLIGRAM(S): 75 TABLET, FILM COATED ORAL at 06:46

## 2021-08-03 RX ADMIN — TIOTROPIUM BROMIDE 1 CAPSULE(S): 18 CAPSULE ORAL; RESPIRATORY (INHALATION) at 18:41

## 2021-08-03 RX ADMIN — HEPARIN SODIUM 5000 UNIT(S): 5000 INJECTION INTRAVENOUS; SUBCUTANEOUS at 06:45

## 2021-08-03 RX ADMIN — TAMSULOSIN HYDROCHLORIDE 0.4 MILLIGRAM(S): 0.4 CAPSULE ORAL at 21:58

## 2021-08-03 RX ADMIN — Medication 12.5 MILLIGRAM(S): at 08:41

## 2021-08-03 RX ADMIN — BUDESONIDE AND FORMOTEROL FUMARATE DIHYDRATE 2 PUFF(S): 160; 4.5 AEROSOL RESPIRATORY (INHALATION) at 06:45

## 2021-08-03 RX ADMIN — Medication 12.5 MILLIGRAM(S): at 06:46

## 2021-08-03 RX ADMIN — Medication 25 MILLIGRAM(S): at 18:13

## 2021-08-03 NOTE — PROVIDER CONTACT NOTE (OTHER) - ASSESSMENT
Pt arrived from the ER & placed on telemonitoring. Pt was having episode of SVT HR in the 170's, while urinating. Pt denies cp/palpitations. /93, RR22, SpO2 100% on 2L NC.

## 2021-08-04 LAB
ANION GAP SERPL CALC-SCNC: 11 MMOL/L — SIGNIFICANT CHANGE UP (ref 5–17)
BUN SERPL-MCNC: 20 MG/DL — SIGNIFICANT CHANGE UP (ref 7–23)
CALCIUM SERPL-MCNC: 10 MG/DL — SIGNIFICANT CHANGE UP (ref 8.4–10.5)
CHLORIDE SERPL-SCNC: 102 MMOL/L — SIGNIFICANT CHANGE UP (ref 96–108)
CO2 SERPL-SCNC: 25 MMOL/L — SIGNIFICANT CHANGE UP (ref 22–31)
CREAT SERPL-MCNC: 1.18 MG/DL — SIGNIFICANT CHANGE UP (ref 0.5–1.3)
GLUCOSE BLDC GLUCOMTR-MCNC: 173 MG/DL — HIGH (ref 70–99)
GLUCOSE BLDC GLUCOMTR-MCNC: 183 MG/DL — HIGH (ref 70–99)
GLUCOSE BLDC GLUCOMTR-MCNC: 254 MG/DL — HIGH (ref 70–99)
GLUCOSE BLDC GLUCOMTR-MCNC: 286 MG/DL — HIGH (ref 70–99)
GLUCOSE SERPL-MCNC: 162 MG/DL — HIGH (ref 70–99)
MAGNESIUM SERPL-MCNC: 1.9 MG/DL — SIGNIFICANT CHANGE UP (ref 1.6–2.6)
PHOSPHATE SERPL-MCNC: 3.3 MG/DL — SIGNIFICANT CHANGE UP (ref 2.5–4.5)
POTASSIUM SERPL-MCNC: 3.9 MMOL/L — SIGNIFICANT CHANGE UP (ref 3.5–5.3)
POTASSIUM SERPL-SCNC: 3.9 MMOL/L — SIGNIFICANT CHANGE UP (ref 3.5–5.3)
SODIUM SERPL-SCNC: 138 MMOL/L — SIGNIFICANT CHANGE UP (ref 135–145)

## 2021-08-04 RX ORDER — METOPROLOL TARTRATE 50 MG
50 TABLET ORAL
Refills: 0 | Status: DISCONTINUED | OUTPATIENT
Start: 2021-08-04 | End: 2021-08-07

## 2021-08-04 RX ORDER — METOPROLOL TARTRATE 50 MG
25 TABLET ORAL ONCE
Refills: 0 | Status: COMPLETED | OUTPATIENT
Start: 2021-08-04 | End: 2021-08-04

## 2021-08-04 RX ORDER — METOPROLOL TARTRATE 50 MG
25 TABLET ORAL ONCE
Refills: 0 | Status: DISCONTINUED | OUTPATIENT
Start: 2021-08-04 | End: 2021-08-04

## 2021-08-04 RX ORDER — POTASSIUM CHLORIDE 20 MEQ
10 PACKET (EA) ORAL ONCE
Refills: 0 | Status: COMPLETED | OUTPATIENT
Start: 2021-08-04 | End: 2021-08-04

## 2021-08-04 RX ADMIN — TAMSULOSIN HYDROCHLORIDE 0.4 MILLIGRAM(S): 0.4 CAPSULE ORAL at 21:10

## 2021-08-04 RX ADMIN — ATORVASTATIN CALCIUM 20 MILLIGRAM(S): 80 TABLET, FILM COATED ORAL at 21:10

## 2021-08-04 RX ADMIN — Medication 6: at 12:55

## 2021-08-04 RX ADMIN — TIOTROPIUM BROMIDE 1 CAPSULE(S): 18 CAPSULE ORAL; RESPIRATORY (INHALATION) at 12:18

## 2021-08-04 RX ADMIN — CLOPIDOGREL BISULFATE 75 MILLIGRAM(S): 75 TABLET, FILM COATED ORAL at 12:17

## 2021-08-04 RX ADMIN — Medication 2: at 09:08

## 2021-08-04 RX ADMIN — LOSARTAN POTASSIUM 25 MILLIGRAM(S): 100 TABLET, FILM COATED ORAL at 06:24

## 2021-08-04 RX ADMIN — APIXABAN 5 MILLIGRAM(S): 2.5 TABLET, FILM COATED ORAL at 17:46

## 2021-08-04 RX ADMIN — Medication 10 MILLIEQUIVALENT(S): at 09:10

## 2021-08-04 RX ADMIN — BUDESONIDE AND FORMOTEROL FUMARATE DIHYDRATE 2 PUFF(S): 160; 4.5 AEROSOL RESPIRATORY (INHALATION) at 17:48

## 2021-08-04 RX ADMIN — Medication 2: at 17:48

## 2021-08-04 RX ADMIN — Medication 25 MILLIGRAM(S): at 06:51

## 2021-08-04 RX ADMIN — Medication 25 MILLIGRAM(S): at 06:24

## 2021-08-04 RX ADMIN — Medication 4 MILLIGRAM(S): at 06:24

## 2021-08-04 RX ADMIN — BUDESONIDE AND FORMOTEROL FUMARATE DIHYDRATE 2 PUFF(S): 160; 4.5 AEROSOL RESPIRATORY (INHALATION) at 06:25

## 2021-08-04 RX ADMIN — APIXABAN 5 MILLIGRAM(S): 2.5 TABLET, FILM COATED ORAL at 06:26

## 2021-08-04 RX ADMIN — Medication 1: at 21:33

## 2021-08-04 RX ADMIN — Medication 50 MILLIGRAM(S): at 17:45

## 2021-08-04 NOTE — PHYSICAL THERAPY INITIAL EVALUATION ADULT - PLANNED THERAPY INTERVENTIONS, PT EVAL
GOAL stair train : pt will negotiate a full flight of steps w/ HR and std cane supervision in 1 week/balance training/bed mobility training/gait training/strengthening/transfer training

## 2021-08-04 NOTE — PHYSICAL THERAPY INITIAL EVALUATION ADULT - GAIT DEVIATIONS NOTED, PT EVAL
on tele during amb pulse increase to 123 with seat rest on bed return to 99 sinus per rn Lizzie/decreased ara/increased time in double stance/decreased step length/decreased stride length/decreased weight-shifting ability

## 2021-08-04 NOTE — PHYSICAL THERAPY INITIAL EVALUATION ADULT - ADDITIONAL COMMENTS
pt lives with spouse pt lives with spouse Sania in house ,spouse is [phone] ; pt has 4 steps platform type w/o HR's then 6 more steps w/ HR 1 side to enter home , pt uses a std cane outdoors to amb , in the home pt amb w/o AD independent , once enter home has 13 steps w/ HR to bedroom level in [place]; pt uses BIPAP at night when sleep , has inogen /concentrator unit at home use 2 l nc o2 as need during day ;pt has a walk in shower and tub shower with shower chair and grab bar

## 2021-08-04 NOTE — CHART NOTE - NSCHARTNOTEFT_GEN_A_CORE
MEDICINE PA    EVENT SUMMARY  Notified by RN for pSVT 160s x 25 secs resolved on its own. Pt asx at the time of the even. Pt was in the bathroom at the time of the event. Pt with episodes of PATs previously this night. Pt denies any CP, SOB, dyspnea, numbness, tingling. PT being followed by EP and pt's metoprolol increased to 25 mg BID. EKG done with NSR with frequent PACs. BMP/Mg and phos done. M.8; 1g Mg So4 x 1. Will continue to monitor.    Sada EATON  #[id number]    ADDENDUM @ 6:30  D/w on call EP NELLY Headley; recommended to increase metoprolol to 50 mg BID. VSS. Will give 25mg x1 and up titrate the dose this AM. EP to f/u in AM. Will endorse to the day team.    aSda EATON  #[id number]

## 2021-08-04 NOTE — PHYSICAL THERAPY INITIAL EVALUATION ADULT - TRANSFER TRAINING, PT EVAL
GOAL: pt will transfer sit-stand at [facility] cane independent steady in 1 week

## 2021-08-04 NOTE — PHYSICAL THERAPY INITIAL EVALUATION ADULT - IMPAIRMENTS CONTRIBUTING IMPAIRED BED MOBILITY, REHAB EVAL
sat x 6 min EOB close supervision steady pulse ox 96% RA HR 90's sinus on tele rn Lizzie aware going to amb with pt so rn check on tele as amb/impaired postural control/decreased strength

## 2021-08-04 NOTE — PHYSICAL THERAPY INITIAL EVALUATION ADULT - GENERAL OBSERVATIONS, REHAB EVAL
pt received in bed all siderqails up HOB 35 degrees nc O2 in reach 2 L pt not wear pulse ox 96-97% at rest RA , wife at b/s

## 2021-08-04 NOTE — PHYSICAL THERAPY INITIAL EVALUATION ADULT - REFERRING PHYSICIAN, REHAB EVAL
Cecelia Mendenhall MD ORDER PT EVAL and TREAT Cecelia Mendenhall MD ORDER PT EVAL and TREAT (per YAN loomis NP state PT can work w/ pt , meds adjusted )

## 2021-08-04 NOTE — PHYSICAL THERAPY INITIAL EVALUATION ADULT - PERTINENT HX OF CURRENT PROBLEM, REHAB EVAL
81M extensive cardiac history including a prior MI, CAD w/ stents, COPD, HTN, HLD, lower extremity stents presents to ED for a fast heart rate while at the urologist for difficulty with urination. Pt states they said his heart was very high, was brought to the ER for evaluation but was otherwise feeling fine, pt denies any recent illness but according to wife in room he has been more short of breath with exertion recently; 8/3/21 EKG SVT to 170s &  8/4/21 00.04 ectopy on tele PAT to 170 for 5.03

## 2021-08-04 NOTE — PHYSICAL THERAPY INITIAL EVALUATION ADULT - IMPAIRED TRANSFERS: SIT/STAND, REHAB EVAL
decreased endurance , sit-stand x 2 w/o ad cg of 1 mild unsteady, x1 at std cane close supervision/impaired balance/impaired postural control/decreased strength

## 2021-08-04 NOTE — PHYSICAL THERAPY INITIAL EVALUATION ADULT - PRECAUTIONS/LIMITATIONS, REHAB EVAL
cont .. 8/4/21 2:00 SVT to 160s x 25 sewc resolve on own in BR asx , metoprolol increased ; COVID antibody positve > 250 8/3/21 and COVID19 (not detected ) 8/2/21 ; TTE w/ Doppler 8/3/21 EF 45-50 % , minimal MRand diastolic dysfunction ; A1C 7.7 ; cont .. 8/4/21 2:00 SVT to 160s x 25 sewc resolve on own in BR asx , metoprolol increased ; COVID antibody positve > 250 8/3/21 and COVID19 (not detected ) 8/2/21 ; TTE w/ Doppler 8/3/21 EF 45-50 % , minimal MRand diastolic dysfunction ; A1C 7.7 ;/fall precautions

## 2021-08-04 NOTE — PHYSICAL THERAPY INITIAL EVALUATION ADULT - PATIENT/FAMILY AGREES WITH PLAN
pt refuse further pT services once leave hospital pt offered Home PT refused then Outpt PT pt refuse just want to go home/no

## 2021-08-04 NOTE — PHYSICAL THERAPY INITIAL EVALUATION ADULT - IMPAIRMENTS FOUND, PT EVAL
aerobic capacity/endurance/gait, locomotion, and balance/joint integrity and mobility/muscle strength/sensory integrity

## 2021-08-04 NOTE — PHYSICAL THERAPY INITIAL EVALUATION ADULT - ASR EQUIP NEEDS DISCH PT EVAL
pt owns a std cane , shower chair , grab bar in showers , inogen /concentrator unit , BIPAP unit for at night

## 2021-08-04 NOTE — PHYSICAL THERAPY INITIAL EVALUATION ADULT - IMPAIRMENTS CONTRIBUTING TO GAIT DEVIATIONS, PT EVAL
decreased endurance , mild winded pulse ox 93 % RA resolve with rest 96-97% at rest RA siting on bed , pt pacing self when amb w/ vc 's intermittent min unsteady w/ std cane close supervision to cg of 1 , w/o AD cg of1 few feet min unsteady throughout ;pt and spouse aware to use std cane to amb with close supervision /cg of 1/impaired balance/impaired postural control/decreased strength

## 2021-08-04 NOTE — PHYSICAL THERAPY INITIAL EVALUATION ADULT - DISCHARGE DISPOSITION, PT EVAL
PT recommend Home with Home PT ,pt decline ; offered Outpt PT to increase fxl mobility / strength le's / endurance / balance/ fall rpevention education continued pt decline all PT services once leave hospital ; pt need close supervision to cg of 1 all fxl activity and adl's pt understood and wife/home w/ assist/home w/ home PT

## 2021-08-05 LAB
ANION GAP SERPL CALC-SCNC: 12 MMOL/L — SIGNIFICANT CHANGE UP (ref 5–17)
BUN SERPL-MCNC: 28 MG/DL — HIGH (ref 7–23)
CALCIUM SERPL-MCNC: 9.5 MG/DL — SIGNIFICANT CHANGE UP (ref 8.4–10.5)
CHLORIDE SERPL-SCNC: 102 MMOL/L — SIGNIFICANT CHANGE UP (ref 96–108)
CO2 SERPL-SCNC: 24 MMOL/L — SIGNIFICANT CHANGE UP (ref 22–31)
CREAT SERPL-MCNC: 1.37 MG/DL — HIGH (ref 0.5–1.3)
GLUCOSE BLDC GLUCOMTR-MCNC: 208 MG/DL — HIGH (ref 70–99)
GLUCOSE BLDC GLUCOMTR-MCNC: 236 MG/DL — HIGH (ref 70–99)
GLUCOSE BLDC GLUCOMTR-MCNC: 241 MG/DL — HIGH (ref 70–99)
GLUCOSE SERPL-MCNC: 165 MG/DL — HIGH (ref 70–99)
MAGNESIUM SERPL-MCNC: 2 MG/DL — SIGNIFICANT CHANGE UP (ref 1.6–2.6)
POTASSIUM SERPL-MCNC: 3.8 MMOL/L — SIGNIFICANT CHANGE UP (ref 3.5–5.3)
POTASSIUM SERPL-SCNC: 3.8 MMOL/L — SIGNIFICANT CHANGE UP (ref 3.5–5.3)
SODIUM SERPL-SCNC: 138 MMOL/L — SIGNIFICANT CHANGE UP (ref 135–145)

## 2021-08-05 PROCEDURE — 93016 CV STRESS TEST SUPVJ ONLY: CPT

## 2021-08-05 PROCEDURE — 78452 HT MUSCLE IMAGE SPECT MULT: CPT | Mod: 26

## 2021-08-05 PROCEDURE — 93018 CV STRESS TEST I&R ONLY: CPT

## 2021-08-05 RX ADMIN — Medication 50 MILLIGRAM(S): at 05:45

## 2021-08-05 RX ADMIN — Medication 4: at 13:02

## 2021-08-05 RX ADMIN — CLOPIDOGREL BISULFATE 75 MILLIGRAM(S): 75 TABLET, FILM COATED ORAL at 13:03

## 2021-08-05 RX ADMIN — Medication 4: at 17:26

## 2021-08-05 RX ADMIN — TIOTROPIUM BROMIDE 1 CAPSULE(S): 18 CAPSULE ORAL; RESPIRATORY (INHALATION) at 13:01

## 2021-08-05 RX ADMIN — APIXABAN 5 MILLIGRAM(S): 2.5 TABLET, FILM COATED ORAL at 05:45

## 2021-08-05 RX ADMIN — LOSARTAN POTASSIUM 25 MILLIGRAM(S): 100 TABLET, FILM COATED ORAL at 05:46

## 2021-08-05 RX ADMIN — BUDESONIDE AND FORMOTEROL FUMARATE DIHYDRATE 2 PUFF(S): 160; 4.5 AEROSOL RESPIRATORY (INHALATION) at 17:26

## 2021-08-05 RX ADMIN — Medication 4 MILLIGRAM(S): at 05:46

## 2021-08-05 RX ADMIN — BUDESONIDE AND FORMOTEROL FUMARATE DIHYDRATE 2 PUFF(S): 160; 4.5 AEROSOL RESPIRATORY (INHALATION) at 05:46

## 2021-08-05 RX ADMIN — TAMSULOSIN HYDROCHLORIDE 0.4 MILLIGRAM(S): 0.4 CAPSULE ORAL at 21:07

## 2021-08-05 RX ADMIN — ATORVASTATIN CALCIUM 20 MILLIGRAM(S): 80 TABLET, FILM COATED ORAL at 21:06

## 2021-08-05 RX ADMIN — Medication 50 MILLIGRAM(S): at 17:26

## 2021-08-05 NOTE — PROGRESS NOTE ADULT - ATTENDING COMMENTS
he is stable copd wise: his stress test seems to be positive for reversible ischemia: would defer to cards for further intervention!
still with tachycardia: but no SOB : he is on bipap overnight:  he should remain on it: for stress testing
wife at bedside: he seems to be doing good: no sob: no wheezing: he is son room air: still tachycardic: don't use albuterol: xopenex prn : on bipap at home  DW acp

## 2021-08-06 LAB
ANION GAP SERPL CALC-SCNC: 10 MMOL/L — SIGNIFICANT CHANGE UP (ref 5–17)
BUN SERPL-MCNC: 23 MG/DL — SIGNIFICANT CHANGE UP (ref 7–23)
CALCIUM SERPL-MCNC: 10.1 MG/DL — SIGNIFICANT CHANGE UP (ref 8.4–10.5)
CHLORIDE SERPL-SCNC: 105 MMOL/L — SIGNIFICANT CHANGE UP (ref 96–108)
CO2 SERPL-SCNC: 25 MMOL/L — SIGNIFICANT CHANGE UP (ref 22–31)
CREAT SERPL-MCNC: 1.24 MG/DL — SIGNIFICANT CHANGE UP (ref 0.5–1.3)
GLUCOSE BLDC GLUCOMTR-MCNC: 143 MG/DL — HIGH (ref 70–99)
GLUCOSE BLDC GLUCOMTR-MCNC: 198 MG/DL — HIGH (ref 70–99)
GLUCOSE BLDC GLUCOMTR-MCNC: 199 MG/DL — HIGH (ref 70–99)
GLUCOSE BLDC GLUCOMTR-MCNC: 240 MG/DL — HIGH (ref 70–99)
GLUCOSE SERPL-MCNC: 275 MG/DL — HIGH (ref 70–99)
POTASSIUM SERPL-MCNC: 4.5 MMOL/L — SIGNIFICANT CHANGE UP (ref 3.5–5.3)
POTASSIUM SERPL-SCNC: 4.5 MMOL/L — SIGNIFICANT CHANGE UP (ref 3.5–5.3)
SODIUM SERPL-SCNC: 140 MMOL/L — SIGNIFICANT CHANGE UP (ref 135–145)

## 2021-08-06 PROCEDURE — 92928 PRQ TCAT PLMT NTRAC ST 1 LES: CPT | Mod: LD

## 2021-08-06 PROCEDURE — 99152 MOD SED SAME PHYS/QHP 5/>YRS: CPT

## 2021-08-06 PROCEDURE — 93567 NJX CAR CTH SPRVLV AORTGRPHY: CPT

## 2021-08-06 PROCEDURE — 93459 L HRT ART/GRFT ANGIO: CPT | Mod: 26,59

## 2021-08-06 PROCEDURE — 93010 ELECTROCARDIOGRAM REPORT: CPT

## 2021-08-06 RX ADMIN — LOSARTAN POTASSIUM 25 MILLIGRAM(S): 100 TABLET, FILM COATED ORAL at 05:25

## 2021-08-06 RX ADMIN — Medication 4 MILLIGRAM(S): at 05:25

## 2021-08-06 RX ADMIN — TIOTROPIUM BROMIDE 1 CAPSULE(S): 18 CAPSULE ORAL; RESPIRATORY (INHALATION) at 08:49

## 2021-08-06 RX ADMIN — Medication 50 MILLIGRAM(S): at 19:22

## 2021-08-06 RX ADMIN — BUDESONIDE AND FORMOTEROL FUMARATE DIHYDRATE 2 PUFF(S): 160; 4.5 AEROSOL RESPIRATORY (INHALATION) at 05:25

## 2021-08-06 RX ADMIN — Medication 50 MILLIGRAM(S): at 05:25

## 2021-08-06 RX ADMIN — Medication: at 15:35

## 2021-08-06 RX ADMIN — CLOPIDOGREL BISULFATE 75 MILLIGRAM(S): 75 TABLET, FILM COATED ORAL at 08:49

## 2021-08-06 RX ADMIN — TAMSULOSIN HYDROCHLORIDE 0.4 MILLIGRAM(S): 0.4 CAPSULE ORAL at 21:50

## 2021-08-06 RX ADMIN — BUDESONIDE AND FORMOTEROL FUMARATE DIHYDRATE 2 PUFF(S): 160; 4.5 AEROSOL RESPIRATORY (INHALATION) at 21:50

## 2021-08-06 RX ADMIN — Medication 2: at 08:53

## 2021-08-06 RX ADMIN — ATORVASTATIN CALCIUM 20 MILLIGRAM(S): 80 TABLET, FILM COATED ORAL at 21:50

## 2021-08-06 NOTE — CHART NOTE - NSCHARTNOTEFT_GEN_A_CORE
Removal of Femoral Sheath    Pulses in the right lower extremity are audible by doppler by the DP pulse. The patient was placed in the supine position. The insertion site was identified and the sutures were removed per protocol.  The 6 Greek femoral sheath was then removed from the right femoral artery. Direct pressure was applied for  20 min minutes.     Monitoring of the right groin and both lower extremities including neuro-vascular checks and vital signs every 15 minutes x 4, then every 30 minutes x 2, then every 1 hour was ordered.    Complications: Patient had a burs of A-fib for about 30seconds and retuned to NSR after "bearing down "

## 2021-08-07 ENCOUNTER — TRANSCRIPTION ENCOUNTER (OUTPATIENT)
Age: 81
End: 2021-08-07

## 2021-08-07 VITALS
RESPIRATION RATE: 18 BRPM | TEMPERATURE: 98 F | HEART RATE: 77 BPM | DIASTOLIC BLOOD PRESSURE: 65 MMHG | SYSTOLIC BLOOD PRESSURE: 101 MMHG | OXYGEN SATURATION: 98 %

## 2021-08-07 LAB
ANION GAP SERPL CALC-SCNC: 11 MMOL/L — SIGNIFICANT CHANGE UP (ref 5–17)
BUN SERPL-MCNC: 22 MG/DL — SIGNIFICANT CHANGE UP (ref 7–23)
CALCIUM SERPL-MCNC: 9.4 MG/DL — SIGNIFICANT CHANGE UP (ref 8.4–10.5)
CHLORIDE SERPL-SCNC: 105 MMOL/L — SIGNIFICANT CHANGE UP (ref 96–108)
CO2 SERPL-SCNC: 24 MMOL/L — SIGNIFICANT CHANGE UP (ref 22–31)
CREAT SERPL-MCNC: 1.24 MG/DL — SIGNIFICANT CHANGE UP (ref 0.5–1.3)
GLUCOSE BLDC GLUCOMTR-MCNC: 204 MG/DL — HIGH (ref 70–99)
GLUCOSE BLDC GLUCOMTR-MCNC: 324 MG/DL — HIGH (ref 70–99)
GLUCOSE SERPL-MCNC: 185 MG/DL — HIGH (ref 70–99)
POTASSIUM SERPL-MCNC: 4.1 MMOL/L — SIGNIFICANT CHANGE UP (ref 3.5–5.3)
POTASSIUM SERPL-SCNC: 4.1 MMOL/L — SIGNIFICANT CHANGE UP (ref 3.5–5.3)
SODIUM SERPL-SCNC: 140 MMOL/L — SIGNIFICANT CHANGE UP (ref 135–145)

## 2021-08-07 PROCEDURE — 85018 HEMOGLOBIN: CPT

## 2021-08-07 PROCEDURE — 85025 COMPLETE CBC W/AUTO DIFF WBC: CPT

## 2021-08-07 PROCEDURE — 97162 PT EVAL MOD COMPLEX 30 MIN: CPT

## 2021-08-07 PROCEDURE — 99152 MOD SED SAME PHYS/QHP 5/>YRS: CPT

## 2021-08-07 PROCEDURE — 86769 SARS-COV-2 COVID-19 ANTIBODY: CPT

## 2021-08-07 PROCEDURE — C8929: CPT

## 2021-08-07 PROCEDURE — 83735 ASSAY OF MAGNESIUM: CPT

## 2021-08-07 PROCEDURE — C1887: CPT

## 2021-08-07 PROCEDURE — 93567 NJX CAR CTH SPRVLV AORTGRPHY: CPT

## 2021-08-07 PROCEDURE — 76937 US GUIDE VASCULAR ACCESS: CPT

## 2021-08-07 PROCEDURE — C1751: CPT

## 2021-08-07 PROCEDURE — 80048 BASIC METABOLIC PNL TOTAL CA: CPT

## 2021-08-07 PROCEDURE — C1894: CPT

## 2021-08-07 PROCEDURE — 93010 ELECTROCARDIOGRAM REPORT: CPT

## 2021-08-07 PROCEDURE — 84100 ASSAY OF PHOSPHORUS: CPT

## 2021-08-07 PROCEDURE — 71046 X-RAY EXAM CHEST 2 VIEWS: CPT

## 2021-08-07 PROCEDURE — 99291 CRITICAL CARE FIRST HOUR: CPT

## 2021-08-07 PROCEDURE — C1874: CPT

## 2021-08-07 PROCEDURE — U0005: CPT

## 2021-08-07 PROCEDURE — 80053 COMPREHEN METABOLIC PANEL: CPT

## 2021-08-07 PROCEDURE — 99153 MOD SED SAME PHYS/QHP EA: CPT

## 2021-08-07 PROCEDURE — A9500: CPT

## 2021-08-07 PROCEDURE — 93459 L HRT ART/GRFT ANGIO: CPT | Mod: 59

## 2021-08-07 PROCEDURE — 80061 LIPID PANEL: CPT

## 2021-08-07 PROCEDURE — 84295 ASSAY OF SERUM SODIUM: CPT

## 2021-08-07 PROCEDURE — 84443 ASSAY THYROID STIM HORMONE: CPT

## 2021-08-07 PROCEDURE — 81001 URINALYSIS AUTO W/SCOPE: CPT

## 2021-08-07 PROCEDURE — 36569 INSJ PICC 5 YR+ W/O IMAGING: CPT

## 2021-08-07 PROCEDURE — C9600: CPT | Mod: LD

## 2021-08-07 PROCEDURE — 82435 ASSAY OF BLOOD CHLORIDE: CPT

## 2021-08-07 PROCEDURE — 82947 ASSAY GLUCOSE BLOOD QUANT: CPT

## 2021-08-07 PROCEDURE — 82962 GLUCOSE BLOOD TEST: CPT

## 2021-08-07 PROCEDURE — 97116 GAIT TRAINING THERAPY: CPT

## 2021-08-07 PROCEDURE — 94640 AIRWAY INHALATION TREATMENT: CPT

## 2021-08-07 PROCEDURE — 82330 ASSAY OF CALCIUM: CPT

## 2021-08-07 PROCEDURE — 84132 ASSAY OF SERUM POTASSIUM: CPT

## 2021-08-07 PROCEDURE — 97530 THERAPEUTIC ACTIVITIES: CPT

## 2021-08-07 PROCEDURE — 85014 HEMATOCRIT: CPT

## 2021-08-07 PROCEDURE — 93017 CV STRESS TEST TRACING ONLY: CPT

## 2021-08-07 PROCEDURE — 83605 ASSAY OF LACTIC ACID: CPT

## 2021-08-07 PROCEDURE — 84439 ASSAY OF FREE THYROXINE: CPT

## 2021-08-07 PROCEDURE — 84484 ASSAY OF TROPONIN QUANT: CPT

## 2021-08-07 PROCEDURE — 36600 WITHDRAWAL OF ARTERIAL BLOOD: CPT

## 2021-08-07 PROCEDURE — 93005 ELECTROCARDIOGRAM TRACING: CPT

## 2021-08-07 PROCEDURE — 78452 HT MUSCLE IMAGE SPECT MULT: CPT

## 2021-08-07 PROCEDURE — C1769: CPT

## 2021-08-07 PROCEDURE — 82803 BLOOD GASES ANY COMBINATION: CPT

## 2021-08-07 PROCEDURE — 94660 CPAP INITIATION&MGMT: CPT

## 2021-08-07 PROCEDURE — U0003: CPT

## 2021-08-07 PROCEDURE — 83036 HEMOGLOBIN GLYCOSYLATED A1C: CPT

## 2021-08-07 RX ORDER — CLOPIDOGREL BISULFATE 75 MG/1
1 TABLET, FILM COATED ORAL
Qty: 30 | Refills: 0
Start: 2021-08-07 | End: 2021-09-05

## 2021-08-07 RX ORDER — ASPIRIN/CALCIUM CARB/MAGNESIUM 324 MG
81 TABLET ORAL DAILY
Refills: 0 | Status: DISCONTINUED | OUTPATIENT
Start: 2021-08-07 | End: 2021-08-07

## 2021-08-07 RX ORDER — ASCORBIC ACID 60 MG
1 TABLET,CHEWABLE ORAL
Qty: 0 | Refills: 0 | DISCHARGE

## 2021-08-07 RX ORDER — UBIDECARENONE 100 MG
1 CAPSULE ORAL
Qty: 0 | Refills: 0 | DISCHARGE

## 2021-08-07 RX ORDER — METFORMIN HYDROCHLORIDE 850 MG/1
1 TABLET ORAL
Qty: 0 | Refills: 0 | DISCHARGE

## 2021-08-07 RX ORDER — AZITHROMYCIN 500 MG/1
1 TABLET, FILM COATED ORAL
Qty: 0 | Refills: 0 | DISCHARGE

## 2021-08-07 RX ORDER — CHOLECALCIFEROL (VITAMIN D3) 125 MCG
1 CAPSULE ORAL
Qty: 0 | Refills: 0 | DISCHARGE

## 2021-08-07 RX ORDER — METOPROLOL TARTRATE 50 MG
1 TABLET ORAL
Qty: 60 | Refills: 0
Start: 2021-08-07 | End: 2021-09-05

## 2021-08-07 RX ORDER — METOPROLOL TARTRATE 50 MG
0.5 TABLET ORAL
Qty: 0 | Refills: 0 | DISCHARGE

## 2021-08-07 RX ORDER — APIXABAN 2.5 MG/1
1 TABLET, FILM COATED ORAL
Qty: 0 | Refills: 0 | DISCHARGE
Start: 2021-08-07

## 2021-08-07 RX ORDER — ZINC SULFATE TAB 220 MG (50 MG ZINC EQUIVALENT) 220 (50 ZN) MG
1 TAB ORAL
Qty: 0 | Refills: 0 | DISCHARGE

## 2021-08-07 RX ORDER — LOSARTAN POTASSIUM 100 MG/1
1 TABLET, FILM COATED ORAL
Qty: 0 | Refills: 0 | DISCHARGE
Start: 2021-08-07

## 2021-08-07 RX ORDER — EMPAGLIFLOZIN 10 MG/1
1 TABLET, FILM COATED ORAL
Qty: 0 | Refills: 0 | DISCHARGE

## 2021-08-07 RX ORDER — APIXABAN 2.5 MG/1
5 TABLET, FILM COATED ORAL EVERY 12 HOURS
Refills: 0 | Status: DISCONTINUED | OUTPATIENT
Start: 2021-08-07 | End: 2021-08-07

## 2021-08-07 RX ORDER — APIXABAN 2.5 MG/1
1 TABLET, FILM COATED ORAL
Qty: 60 | Refills: 0
Start: 2021-08-07 | End: 2021-09-05

## 2021-08-07 RX ORDER — OMEGA-3 ACID ETHYL ESTERS 1 G
1 CAPSULE ORAL
Qty: 0 | Refills: 0 | DISCHARGE

## 2021-08-07 RX ADMIN — LOSARTAN POTASSIUM 25 MILLIGRAM(S): 100 TABLET, FILM COATED ORAL at 06:35

## 2021-08-07 RX ADMIN — Medication 50 MILLIGRAM(S): at 06:35

## 2021-08-07 RX ADMIN — CLOPIDOGREL BISULFATE 75 MILLIGRAM(S): 75 TABLET, FILM COATED ORAL at 09:34

## 2021-08-07 RX ADMIN — Medication 4 MILLIGRAM(S): at 06:35

## 2021-08-07 RX ADMIN — Medication 81 MILLIGRAM(S): at 13:22

## 2021-08-07 RX ADMIN — Medication 4: at 09:34

## 2021-08-07 RX ADMIN — APIXABAN 5 MILLIGRAM(S): 2.5 TABLET, FILM COATED ORAL at 11:04

## 2021-08-07 RX ADMIN — BUDESONIDE AND FORMOTEROL FUMARATE DIHYDRATE 2 PUFF(S): 160; 4.5 AEROSOL RESPIRATORY (INHALATION) at 06:35

## 2021-08-07 RX ADMIN — Medication 8: at 13:23

## 2021-08-07 RX ADMIN — TIOTROPIUM BROMIDE 1 CAPSULE(S): 18 CAPSULE ORAL; RESPIRATORY (INHALATION) at 09:35

## 2021-08-07 NOTE — CHART NOTE - NSCHARTNOTEFT_GEN_A_CORE
Pt with Episode of PAT , asymptomatic , reviewed with DR Payton , c/w Metoprolol , Pt is cleared for discharge   Jj Calixto NP-C [id number]

## 2021-08-07 NOTE — PROGRESS NOTE ADULT - ASSESSMENT
81M extensive cardiac history including a prior MI, CAD w/ stents, COPD, HTN, HLD, lower extremity stents presents to ED for a fast heart rate while at the urologist for difficulty with urination. Pt states they said his heart was very high, was brought to the ER for evaluation but was otherwise feeling fine, pt denies any recent illness but according to wife in room he has been more short of breath with exertion recently. Pt states he is currently feeling well.     Problem/Plan - 1:  ·  Problem: Tachycardia sec to PAT and PAF .  Plan: EP helping and planning AC after cardiac cath .   Rate control with BB.   < from: TTE with Doppler (w/Cont) (08.03.21 @ 06:53) >  Conclusions:  1. Mitral valve not well visualized, probably normal.  Minimal mitral regurgitation.  2. Calcified trileaflet aortic valve with normal opening.  No aortic valve regurgitation seen.  3. Mild segmental left ventricular systolic dysfunction.  The ejection fraction varies with the R-R interval.  The  apical inferior wall, the apical septum, the apical lateral  wall, the basal inferior wall, the mid anterior wall, and  the mid inferior wall are hypokinetic.  Septal motion  consistent with cardiac surgery.  4. Mild diastolic dysfunction (Stage I).  5. Normal right ventricular size and function.  *** Compared with echocardiogram of 6/14/2018, there has  beenan interval decline in left ventricular systolic  function.    < end of copied text >    < from: Nuclear Stress Test-Pharmacologic (Nuclear Stress Test-Pharmacologic .) (08.05.21 @ 11:16) >  IMPRESSIONS:Abnormal Study  * Chest Pain: No chest pain with administration of  Regadenoson.  * Symptom: No Symptom.  * HR Response: Appropriate.  * BP Response: Appropriate.  * Heart Rhythm: Sinus Rhythm- Occassional APD's - 85 BPM.  * Conduction defects: Rightward axis.  * Q Waves: V1-V2.  * Baseline ECG: Nonspecific ST-T wave abnormality.  Early  repolarization.  * ECG Changes: No significant ischemic ST segment changes.  * Arrhythmia: Occasional APDs occurred during rest, stress  and recovery.  * The left ventricle was normal in size.  * There are medium-sized, moderate defects in the mid to  distal anterolateral and distal anterior walls that are  mostly reversible suggestive of ischemia with partial  scarring.  * There are medium-sized, mild to moderate defects in the  anteroseptal and apical walls that are partially  reversible suggestive of infarct with moderate  ajay-infarct ischemia.  * There are large, mild defects in the inferoseptal, mid  to distal inferior, and distal inferolateral walls that  are mostly fixed suggestive of infarct with mild  ajay-infarct ischemia.  * Post-stress gated wall motion analysis was performed  (LVEF = 54 %;LVEDV = 83 ml.) revealing hypokinesis of the  septal, distal anterior, and apical walls and reduced  systolic thickening of the mid to distal inferior and  distal inferolateral walls.    < end of copied text >     Problem/Plan - 2:  ·  Problem: CAD (Coronary Artery Disease).  Plan: S/P CABG . Presently asymptomatic . Continuing home meds. Trop x 2 noted. TTE noted and has abnormal Stress test . Planned for cardiac cath today .      Problem/Plan - 3:  ·  Problem: COPD (chronic obstructive pulmonary disease).  Plan: Home inhalers. Pulmonary helping.      Problem/Plan - 4:  ·  Problem: Hypertension.  Plan: BP meds with hold parameters.      Problem/Plan - 5:  ·  Problem: Diabetes Mellitus, Type II.  Plan: SSI for now. Sugars in acceptable range.      Problem/Plan - 6:  Problem: Dyslipidemia. Plan: statin,.     Problem/Plan - 7:  ·  Problem: Chronic kidney disease (CKD), stage III (moderate).  Plan: Creatinine stable . Will watch .     Disposition : DC planning pending cardiac cath .     
81M extensive cardiac history including a prior MI, CAD w/ stents, COPD, HTN, HLD, lower extremity stents presents to ED for a fast heart rate while at the urologist for difficulty with urination. Pt states they said his heart was very high, was brought to the ER for evaluation but was otherwise feeling fine, pt denies any recent illness but according to wife in room he has been more short of breath with exertion recently. Pt states he is currently feeling well.    palpitations: tachycardia:  copd on home oxygen:   CAD? MI  HTN:  HLD  dm  ckd    8/2:      palpitations: tachycardia: NSR: seen by EP: follow recommendations:   copd on home oxygen: add Symbicort no wheezing he si already on home oxygen: venous pg is acidotic: repeat ABG in am  : no need for bipap tonight: he looks comfortable:   CAD? MI: per cards: repeat echo    HTN: controlleD:   HLD  dm : monitor and c ontrol   ckd : cont to monitor  dw team     8/3  COPD  -Not currently exacerbated  -On baseline O2 requirements 2LNC  -Repeat ABG noted 7.40/46/106/28  -Pt uses bipap qhs at home, can continue here at patient request (pt has home machine, can be approved by [employer] for use)  -C/w Symbicort 160/4.5 mcg 2 puffs BID, can add Spiriva 18 mcg 1 capsule inhaled daily (Pt takes Breo Ellipta & Incruse Ellipta at home)  -Normoxic  CAD  -continue current meds  -f/u echo  Palpitations/tachycardia  -EP recs noted  D/w patient & wife    8/4  COPD  -Not currently exacerbated  -Seen on room air, O2 sats 98%  -Can use 2LNC PRN for o2 sat <90% (Baseline O2 requirements)  -ABG noted 7.40/46/106/28  -Pt uses bipap qhs at home, can continue here at patient request (pt has home machine, can be approved by [employer] for use)  -C/w Symbicort 160/4.5 mcg 2 puffs BID, Spiriva 18 mcg 1 capsule inhaled daily (Pt takes Breo Ellipta & Incruse Ellipta at home)  -Xopenex PRN  CAD  -continue current meds  -TTE noted   Palpitations/tachycardia  -EP recs noted  -TTE now with reduced LV EF compared to prior echos  -F/u NST     8/5  COPD  -Not currently exacerbated  -Seen on room air, O2 sats 98%  -Can use 2LNC PRN for o2 sat <90% (Baseline O2 requirements)  -ABG noted 7.40/46/106/28  -Pt uses bipap qhs at home, can continue here at patient request (pt has home machine, can be approved by [employer] for use)  -C/w Symbicort 160/4.5 mcg 2 puffs BID, Spiriva 18 mcg 1 capsule inhaled daily (Pt takes Breo Ellipta & Incruse Ellipta at home)  -Xopenex PRN  -Pt to f/u with own pulmonologist on d/c  CAD  -continue current meds  -TTE noted   Palpitations/tachycardia  -EP recs noted  -TTE now with reduced LV EF compared to prior echos  -F/u NST  
81M extensive cardiac history including a prior MI, CAD w/ stents, COPD, HTN, HLD, lower extremity stents presents to ED for a fast heart rate while at the urologist for difficulty with urination. Pt states they said his heart was very high, was brought to the ER for evaluation but was otherwise feeling fine, pt denies any recent illness but according to wife in room he has been more short of breath with exertion recently. Pt states he is currently feeling well.    palpitations: tachycardia:  copd on home oxygen:   CAD? MI  HTN:  HLD  dm  ckd    8/2:      palpitations: tachycardia: NSR: seen by EP: follow recommendations:   copd on home oxygen: add Symbicort no wheezing he si already on home oxygen: venous pg is acidotic: repeat ABG in am  : no need for bipap tonight: he looks comfortable:   CAD? MI: per cards: repeat echo    HTN: controlleD:   HLD  dm : monitor and c ontrol   ckd : cont to monitor  dw team     8/3  COPD  -Not currently exacerbated  -On baseline O2 requirements 2LNC  -Repeat ABG noted 7.40/46/106/28  -Pt uses bipap qhs at home, can continue here at patient request (pt has home machine, can be approved by [employer] for use)  -C/w Symbicort 160/4.5 mcg 2 puffs BID, can add Spiriva 18 mcg 1 capsule inhaled daily (Pt takes Breo Ellipta & Incruse Ellipta at home)  -Normoxic  CAD  -continue current meds  -f/u echo  Palpitations/tachycardia  -EP recs noted  D/w patient & wife    8/4  COPD  -Not currently exacerbated  -Seen on room air, O2 sats 98%  -Can use 2LNC PRN for o2 sat <90% (Baseline O2 requirements)  -ABG noted 7.40/46/106/28  -Pt uses bipap qhs at home, can continue here at patient request (pt has home machine, can be approved by [employer] for use)  -C/w Symbicort 160/4.5 mcg 2 puffs BID, Spiriva 18 mcg 1 capsule inhaled daily (Pt takes Breo Ellipta & Incruse Ellipta at home)  -Xopenex PRN  CAD  -continue current meds  -TTE noted   Palpitations/tachycardia  -EP recs noted  -TTE now with reduced LV EF compared to prior echos  -F/u NST   
81M extensive cardiac history including a prior MI, CAD w/ stents, COPD, HTN, HLD, lower extremity stents presents to ED for a fast heart rate while at the urologist for difficulty with urination. Pt states they said his heart was very high, was brought to the ER for evaluation but was otherwise feeling fine, pt denies any recent illness but according to wife in room he has been more short of breath with exertion recently. Pt states he is currently feeling well.     Problem/Plan - 1:  ·  Problem: Tachycardia sec to PAT and PAF .  Plan: EP helping and planning AC after TTE results. Rate control with BB.   < from: TTE with Doppler (w/Cont) (08.03.21 @ 06:53) >  Conclusions:  1. Mitral valve not well visualized, probably normal.  Minimal mitral regurgitation.  2. Calcified trileaflet aortic valve with normal opening.  No aortic valve regurgitation seen.  3. Mild segmental left ventricular systolic dysfunction.  The ejection fraction varies with the R-R interval.  The  apical inferior wall, the apical septum, the apical lateral  wall, the basal inferior wall, the mid anterior wall, and  the mid inferior wall are hypokinetic.  Septal motion  consistent with cardiac surgery.  4. Mild diastolic dysfunction (Stage I).  5. Normal right ventricular size and function.  *** Compared with echocardiogram of 6/14/2018, there has  beenan interval decline in left ventricular systolic  function.    < end of copied text >    < from: Nuclear Stress Test-Pharmacologic (Nuclear Stress Test-Pharmacologic .) (08.05.21 @ 11:16) >  IMPRESSIONS:Abnormal Study  * Chest Pain: No chest pain with administration of  Regadenoson.  * Symptom: No Symptom.  * HR Response: Appropriate.  * BP Response: Appropriate.  * Heart Rhythm: Sinus Rhythm- Occassional APD's - 85 BPM.  * Conduction defects: Rightward axis.  * Q Waves: V1-V2.  * Baseline ECG: Nonspecific ST-T wave abnormality.  Early  repolarization.  * ECG Changes: No significant ischemic ST segment changes.  * Arrhythmia: Occasional APDs occurred during rest, stress  and recovery.  * The left ventricle was normal in size.  * There are medium-sized, moderate defects in the mid to  distal anterolateral and distal anterior walls that are  mostly reversible suggestive of ischemia with partial  scarring.  * There are medium-sized, mild to moderate defects in the  anteroseptal and apical walls that are partially  reversible suggestive of infarct with moderate  ajay-infarct ischemia.  * There are large, mild defects in the inferoseptal, mid  to distal inferior, and distal inferolateral walls that  are mostly fixed suggestive of infarct with mild  ajay-infarct ischemia.  * Post-stress gated wall motion analysis was performed  (LVEF = 54 %;LVEDV = 83 ml.) revealing hypokinesis of the  septal, distal anterior, and apical walls and reduced  systolic thickening of the mid to distal inferior and  distal inferolateral walls.    < end of copied text >     Problem/Plan - 2:  ·  Problem: CAD (Coronary Artery Disease).  Plan: S/P CABG . Presently asymptomatic . Continuing home meds. Trop x 2 noted. TTE noted and abnormal Stress test . Interventional cardiology consulted for cardiac cath .      Problem/Plan - 3:  ·  Problem: COPD (chronic obstructive pulmonary disease).  Plan: Home inhalers. Pulmonary helping.      Problem/Plan - 4:  ·  Problem: Hypertension.  Plan: BP meds with hold parameters.      Problem/Plan - 5:  ·  Problem: Diabetes Mellitus, Type II.  Plan: SSI for now. Sugars little high so will add Lantus 5 Units bedtime.       Problem/Plan - 6:  Problem: Dyslipidemia. Plan: statin,.     Problem/Plan - 7:  ·  Problem: Chronic kidney disease (CKD), stage III (moderate).  Plan: Creatinine stable . Will watch .     Disposition : DC planning pending cardiac cath .       
81M extensive cardiac history including a prior MI, CAD w/ stents, COPD, HTN, HLD, lower extremity stents presents to ED for a fast heart rate while at the urologist for difficulty with urination. Pt states they said his heart was very high, was brought to the ER for evaluation but was otherwise feeling fine, pt denies any recent illness but according to wife in room he has been more short of breath with exertion recently. Pt states he is currently feeling well.     Problem/Plan - 1:  ·  Problem: Tachycardia sec to PAT and PAF .  Plan: EP helping and planning AC after TTE results. Rate control with BB.    TTE  pending.      Problem/Plan - 2:  ·  Problem: CAD (Coronary Artery Disease).  Plan: S/P CABG . Presently asymptomatic . Continuing home meds. Trop x 2 noted. TTE pending.      Problem/Plan - 3:  ·  Problem: COPD (chronic obstructive pulmonary disease).  Plan: Home inhalers. Pulmonary helping.      Problem/Plan - 4:  ·  Problem: Hypertension.  Plan: BP meds with hold parameters.      Problem/Plan - 5:  ·  Problem: Diabetes Mellitus, Type II.  Plan: SSI for now. If sugars up with add Lantus 7 units.      Problem/Plan - 6:  Problem: Dyslipidemia. Plan: statin,.     Problem/Plan - 7:  ·  Problem: Chronic kidney disease (CKD), stage III (moderate).  Plan: Creatinine better. Will watch .     Disposition : DC planning pending TTE.   
81M extensive cardiac history including a prior MI, CAD w/ stents, COPD, HTN, HLD, lower extremity stents presents to ED for a fast heart rate while at the urologist for difficulty with urination. Pt states they said his heart was very high, was brought to the ER for evaluation but was otherwise feeling fine, pt denies any recent illness but according to wife in room he has been more short of breath with exertion recently. Pt states he is currently feeling well.    palpitations: tachycardia:  copd on home oxygen:   CAD? MI  HTN:  HLD  dm  ckd    8/2:      palpitations: tachycardia: NSR: seen by EP: follow recommendations:   copd on home oxygen: add Symbicort no wheezing he si already on home oxygen: venous pg is acidotic: repeat ABG in am  : no need for bipap tonight: he looks comfortable:   CAD? MI: per cards: repeat echo    HTN: controlleD:   HLD  dm : monitor and c ontrol   ckd : cont to monitor  dw team     8/3  COPD  -Not currently exacerbated  -On baseline O2 requirements 2LNC  -Repeat ABG noted 7.40/46/106/28  -Pt uses bipap qhs at home, can continue here at patient request (pt has home machine, can be approved by [employer] for use)  -C/w Symbicort 160/4.5 mcg 2 puffs BID, can add Spiriva 18 mcg 1 capsule inhaled daily (Pt takes Breo Ellipta & Incruse Ellipta at home)  -Normoxic  CAD  -continue current meds  -f/u echo  Palpitations/tachycardia  -EP recs noted  D/w patient & wife
81M extensive cardiac history including a prior MI, CAD w/ stents, COPD, HTN, HLD, lower extremity stents presents to ED for a fast heart rate while at the urologist for difficulty with urination. Pt states they said his heart was very high, was brought to the ER for evaluation but was otherwise feeling fine, pt denies any recent illness but according to wife in room he has been more short of breath with exertion recently. Pt states he is currently feeling well.    palpitations: tachycardia:  copd on home oxygen:   CAD? MI  HTN:  HLD  dm  ckd    8/2:      palpitations: tachycardia: NSR: seen by EP: follow recommendations:   copd on home oxygen: add Symbicort no wheezing he si already on home oxygen: venous pg is acidotic: repeat ABG in am  : no need for bipap tonight: he looks comfortable:   CAD? MI: per cards: repeat echo    HTN: controlleD:   HLD  dm : monitor and c ontrol   ckd : cont to monitor  dw team     8/3  COPD  -Not currently exacerbated  -On baseline O2 requirements 2LNC  -Repeat ABG noted 7.40/46/106/28  -Pt uses bipap qhs at home, can continue here at patient request (pt has home machine, can be approved by [employer] for use)  -C/w Symbicort 160/4.5 mcg 2 puffs BID, can add Spiriva 18 mcg 1 capsule inhaled daily (Pt takes Breo Ellipta & Incruse Ellipta at home)  -Normoxic  CAD  -continue current meds  -f/u echo  Palpitations/tachycardia  -EP recs noted  D/w patient & wife    8/4  COPD  -Not currently exacerbated  -Seen on room air, O2 sats 98%  -Can use 2LNC PRN for o2 sat <90% (Baseline O2 requirements)  -ABG noted 7.40/46/106/28  -Pt uses bipap qhs at home, can continue here at patient request (pt has home machine, can be approved by [employer] for use)  -C/w Symbicort 160/4.5 mcg 2 puffs BID, Spiriva 18 mcg 1 capsule inhaled daily (Pt takes Breo Ellipta & Incruse Ellipta at home)  -Xopenex PRN  CAD  -continue current meds  -TTE noted   Palpitations/tachycardia  -EP recs noted  -TTE now with reduced LV EF compared to prior echos  -F/u NST     8/5  COPD  -Not currently exacerbated  -Seen on room air, O2 sats 98%  -Can use 2LNC PRN for o2 sat <90% (Baseline O2 requirements)  -ABG noted 7.40/46/106/28  -Pt uses bipap qhs at home, can continue here at patient request (pt has home machine, can be approved by [employer] for use)  -C/w Symbicort 160/4.5 mcg 2 puffs BID, Spiriva 18 mcg 1 capsule inhaled daily (Pt takes Breo Ellipta & Incruse Ellipta at home)  -Xopenex PRN  -Pt to f/u with own pulmonologist on d/c  CAD  -continue current meds  -TTE noted   Palpitations/tachycardia  -EP recs noted  -TTE now with reduced LV EF compared to prior echos  -F/u NST    8/6:    COPD  -Not currently exacerbated  -Seen on room air, O2 sats 98%  -Can use 2LNC PRN for o2 sat <90% (Baseline O2 requirements)  -ABG noted 7.40/46/106/28  -Pt uses bipap qhs at home, can continue here at patient request (pt has home machine, can be approved by [employer] for use)  -C/w Symbicort 160/4.5 mcg 2 puffs BID, Spiriva 18 mcg 1 capsule inhaled daily (Pt takes Breo Ellipta & Incruse Ellipta at home)  -Xopenex PRN  -heis pretty stable from pulm point of view:   CAD  -continue current meds  -TTE noted : stress testing is postive ? for cath  Palpitations/tachycardia  -EP recs noted  -TTE now with reduced LV EF compared to prior echos  -F/u NST: reviewed:  DW ACP  
81M extensive cardiac history including a prior MI, CAD w/ stents, COPD, HTN, HLD, lower extremity stents presents to ED for a fast heart rate while at the urologist for difficulty with urination. Pt states they said his heart was very high, was brought to the ER for evaluation but was otherwise feeling fine, pt denies any recent illness but according to wife in room he has been more short of breath with exertion recently. Pt states he is currently feeling well.     Problem/Plan - 1:  ·  Problem: Tachycardia sec to PAT and PAF .  Plan: EP helping and planning AC after TTE results. Rate control with BB.   < from: TTE with Doppler (w/Cont) (08.03.21 @ 06:53) >  Conclusions:  1. Mitral valve not well visualized, probably normal.  Minimal mitral regurgitation.  2. Calcified trileaflet aortic valve with normal opening.  No aortic valve regurgitation seen.  3. Mild segmental left ventricular systolic dysfunction.  The ejection fraction varies with the R-R interval.  The  apical inferior wall, the apical septum, the apical lateral  wall, the basal inferior wall, the mid anterior wall, and  the mid inferior wall are hypokinetic.  Septal motion  consistent with cardiac surgery.  4. Mild diastolic dysfunction (Stage I).  5. Normal right ventricular size and function.  *** Compared with echocardiogram of 6/14/2018, there has  beenan interval decline in left ventricular systolic  function.    < end of copied text >       Problem/Plan - 2:  ·  Problem: CAD (Coronary Artery Disease).  Plan: S/P CABG . Presently asymptomatic . Continuing home meds. Trop x 2 noted. TTE noted and awaiting Stress test .      Problem/Plan - 3:  ·  Problem: COPD (chronic obstructive pulmonary disease).  Plan: Home inhalers. Pulmonary helping.      Problem/Plan - 4:  ·  Problem: Hypertension.  Plan: BP meds with hold parameters.      Problem/Plan - 5:  ·  Problem: Diabetes Mellitus, Type II.  Plan: SSI for now. Sugars in acceptable range.      Problem/Plan - 6:  Problem: Dyslipidemia. Plan: statin,.     Problem/Plan - 7:  ·  Problem: Chronic kidney disease (CKD), stage III (moderate).  Plan: Creatinine better. Will watch .     Disposition : DC planning pending stress test . 
81M extensive cardiac history including a prior MI, CAD w/ stents, COPD, HTN, HLD, lower extremity stents presents to ED for a fast heart rate while at the urologist for difficulty with urination. Pt states they said his heart was very high, was brought to the ER for evaluation but was otherwise feeling fine, pt denies any recent illness but according to wife in room he has been more short of breath with exertion recently. Pt states he is currently feeling well.     Problem/Plan - 1:  ·  Problem: Tachycardia sec to PAT and PAF .  Plan: EP helping and on  AC.   Rate control with BB.   < from: TTE with Doppler (w/Cont) (08.03.21 @ 06:53) >  Conclusions:  1. Mitral valve not well visualized, probably normal.  Minimal mitral regurgitation.  2. Calcified trileaflet aortic valve with normal opening.  No aortic valve regurgitation seen.  3. Mild segmental left ventricular systolic dysfunction.  The ejection fraction varies with the R-R interval.  The  apical inferior wall, the apical septum, the apical lateral  wall, the basal inferior wall, the mid anterior wall, and  the mid inferior wall are hypokinetic.  Septal motion  consistent with cardiac surgery.  4. Mild diastolic dysfunction (Stage I).  5. Normal right ventricular size and function.  *** Compared with echocardiogram of 6/14/2018, there has  beenan interval decline in left ventricular systolic  function.    < end of copied text >    < from: Nuclear Stress Test-Pharmacologic (Nuclear Stress Test-Pharmacologic .) (08.05.21 @ 11:16) >  IMPRESSIONS:Abnormal Study  * Chest Pain: No chest pain with administration of  Regadenoson.  * Symptom: No Symptom.  * HR Response: Appropriate.  * BP Response: Appropriate.  * Heart Rhythm: Sinus Rhythm- Occassional APD's - 85 BPM.  * Conduction defects: Rightward axis.  * Q Waves: V1-V2.  * Baseline ECG: Nonspecific ST-T wave abnormality.  Early  repolarization.  * ECG Changes: No significant ischemic ST segment changes.  * Arrhythmia: Occasional APDs occurred during rest, stress  and recovery.  * The left ventricle was normal in size.  * There are medium-sized, moderate defects in the mid to  distal anterolateral and distal anterior walls that are  mostly reversible suggestive of ischemia with partial  scarring.  * There are medium-sized, mild to moderate defects in the  anteroseptal and apical walls that are partially  reversible suggestive of infarct with moderate  ajay-infarct ischemia.  * There are large, mild defects in the inferoseptal, mid  to distal inferior, and distal inferolateral walls that  are mostly fixed suggestive of infarct with mild  ajay-infarct ischemia.  * Post-stress gated wall motion analysis was performed  (LVEF = 54 %;LVEDV = 83 ml.) revealing hypokinesis of the  septal, distal anterior, and apical walls and reduced  systolic thickening of the mid to distal inferior and  distal inferolateral walls.    < end of copied text >     Problem/Plan - 2:  ·  Problem: CAD (Coronary Artery Disease).  Plan: S/P CABG . Presently asymptomatic . Continuing home meds. Trop x 2 noted. TTE noted and has abnormal Stress test . S/P Cath. DAPT for 2 weeks and then Plavix only with AC.      Problem/Plan - 3:  ·  Problem: COPD (chronic obstructive pulmonary disease).  Plan: Home inhalers. Pulmonary helping.      Problem/Plan - 4:  ·  Problem: Hypertension.  Plan: BP meds with hold parameters.      Problem/Plan - 5:  ·  Problem: Diabetes Mellitus, Type II.  Plan: SSI for now. Sugars in acceptable range.      Problem/Plan - 6:  Problem: Dyslipidemia. Plan: statin,.     Problem/Plan - 7:  ·  Problem: Chronic kidney disease (CKD), stage III (moderate).  Plan: Creatinine stable . Will watch .     Disposition : DC planning home as cleared by cards and EP.

## 2021-08-07 NOTE — PROVIDER CONTACT NOTE (OTHER) - BACKGROUND
Patient admitted for weakness and fatigue, SVT and afib  Hx: CAD, COPD, PVD, HTN
: Weakness & fatigue        svt/afib-eliquis+ metoprolol       new reduced EF ( stress test )
Patient admitted for weakness and fatigue, SVT and afib  Hx: CAD, COPD, PVD, HTN
Pt admitted for Tachycardia.
Dx -Weakness & fatigue, svt/afib-eliquis+ metoprolol. : s/p PCI/ZACHARIAH mLAD x1
admitted with tachycardia, weakness, fatigue

## 2021-08-07 NOTE — DISCHARGE NOTE PROVIDER - NSDCFUADDAPPT_GEN_ALL_CORE_FT
you have an appointment with DR Rod on 09/17/2021  ASA 81 mg for only 2 weeks post stent since you are Eliquis  for Afib

## 2021-08-07 NOTE — PROGRESS NOTE ADULT - REASON FOR ADMISSION
Fast Heart Rate

## 2021-08-07 NOTE — PROGRESS NOTE ADULT - NSICDXPILOT_GEN_ALL_CORE
[place]

## 2021-08-07 NOTE — DISCHARGE NOTE PROVIDER - HOSPITAL COURSE
81M extensive cardiac history including a prior MI, CAD w/ stents, COPD, HTN, HLD, lower extremity stents presents to ED for a fast heart rate while at the urologist for difficulty with urination.  Who was found to have  new onset Afib   Pt had positive stress test , s/p cath  stent to LAD  x 1   Pt remained HD stable asymptomatic , will be discharged home  with Home PT   Pt will follow up with DR Rod on 09/17   Pt instructed to c/w asa   specifically for 2 weeks only

## 2021-08-07 NOTE — DISCHARGE NOTE PROVIDER - NSDCCPCAREPLAN_GEN_ALL_CORE_FT
PRINCIPAL DISCHARGE DIAGNOSIS  Diagnosis: Stented coronary artery  Assessment and Plan of Treatment: c/w Plavix   ASA only for 2 weeks   Coronary artery disease is a condition where the arteries the supply the heart muscle get clogges with fatty deposits & puts you at risk for a heart attack  Call your doctor if you have any new pain, pressure, or discomfort in the center of your chest, pain, tingling or discomfort in arms, back, neck, jaw, or stomach, shortness of breath, nausea, vomiting, burping or heartburn, sweating, cold and clammy skin, racing or abnormal heartbeat for more than 10 minutes or if they keep coming & going.  Call 911 and do not tr to get to hospital by care  You can help yourself with lefestyle changes (quitting smoking if you smoke), eat lots of fruits & vegetables & low fat dairy products, not a lot of meat & fatty foods, walk or some form of physical activity most days of the week, lose weight if you are overweight  Take your cardiac medication as prescribed to lower cholesterol, to lower blood pressure, aspirin to prevent blood clots, and diabetes control  Make sure to keep appointments with doctor for cardiac follow up care        SECONDARY DISCHARGE DIAGNOSES  Diagnosis: Diabetes Mellitus, Type II  Assessment and Plan of Treatment: HgA1C this admission 7.7  Make sure you get your HgA1c checked every three months.  If you take oral diabetes medications, check your blood glucose two times a day.  If you take insulin, check your blood glucose before meals and at bedtime.  It's important not to skip any meals.  Keep a log of your blood glucose results and always take it with you to your doctor appointments.  Keep a list of your current medications including injectables and over the counter medications and bring this medication list with you to all your doctor appointments.  If you have not seen your opthalmologist this year call for appointment.  Check your feet daily for redness, sores, or openings. Do not self treat. If no improvement in two days call your primary care physician for an appointment.  Low blood sugar (hypoglycemia) is a blood sugar below 70mg/dl. Check your blood sugar if you feel signs/symptoms of hypoglycemia. If your blood sugar is below 70 take 15 grams of carbohydrates (ex 4 oz of apple juice, 3-4 glucosr tablets, or 4-6 oz of regular soda) wait 15 minutes and repeat blood sugar to make sure it comes up above 70.  If your blood sugar is above 70 and you are due for a meal, have a meal.  If you are not due for a meal have a snack.  This snack helps keeps your blood sugar at a safe range.      Diagnosis: Hypertension  Assessment and Plan of Treatment: Follow up with your medical doctor to establish long term blood pressure treatment goals.      Diagnosis: COPD (chronic obstructive pulmonary disease)  Assessment and Plan of Treatment: Call your Health Care provider upon arrival home to make a follow up appointment within one week.  Take all inhalers as prescribed by your Health Care Provider.  Take steroids as prescribed by your Health Care Provider.  If your cough increases infrequency and severity and/or you have shortness of breath or increased shortness of breath call your Health Care Provider.  If you develop fever, chills, night sweats, malaise, and/or change in mental status call your Health care Provider.  Nutrition is very important.  Eat small frequent meals.  Increase your activity as tolerated.  Do not stay in bed all day      Diagnosis: Afib  Assessment and Plan of Treatment: Atrial fibrillation is the most common heart rhythm problem & has the risk of stroke & heart attack  It helps if you control your blood pressure, not drink more than 1-2 alcohol drinks per day, cut down on caffeine, getting treatment for over active thyroid gland, & getting exercise  Call your doctor if you feel your heart racing or beating unusually, chest tightness or pain, lightheaded, faint, shortness of breath especially with exercise  It is important to take your heart medication as prescribed  You may be on anticoagulation which is very important to take as directed

## 2021-08-07 NOTE — PROVIDER CONTACT NOTE (OTHER) - ACTION/TREATMENT ORDERED:
NP made aware. Will continue to monitor patient.
PA made aware, no new orders. continue to monitor and notify provider if patient becomes symptomatic or develops further ectopies
continue to monitor
NP assessed pt, will f/u
PA made aware, ordered an EKG. continue to monitor and notify provider if patient becomes symptomatic or develops further ectopies
NELLY Cornelius made aware. No further interventions ordered at this time.

## 2021-08-07 NOTE — DISCHARGE NOTE PROVIDER - NSDCMRMEDTOKEN_GEN_ALL_CORE_FT
apixaban 5 mg oral tablet: 1 tab(s) orally every 12 hours  aspirin 81 mg oral delayed release tablet: 1 tab(s) orally once a day  azithromycin 250 mg oral tablet: 1 tab(s) orally 3 times a week  Breo Ellipta 200 mcg-25 mcg/inh inhalation powder: 1 puff(s) inhaled once a day  clopidogrel 75 mg oral tablet: 1 tab(s) orally once a day  clopidogrel 75 mg oral tablet: 1 tab(s) orally once a day  CoQ10 300 mg oral capsule: 1 cap(s) orally once a day  Fish Oil oral capsule: 1 cap(s) orally once a day  Incruse Ellipta 62.5 mcg/inh inhalation powder: 1 puff(s) inhaled every 24 hours  Jardiance 25 mg oral tablet: 1 tab(s) orally once a day (in the morning)  Jardiance 25 mg oral tablet: 1 tab(s) orally once a day (in the morning)  losartan 25 mg oral tablet: 1 tab(s) orally once a day  metFORMIN 1000 mg oral tablet: 1 tab(s) orally 2 times a day w/food  Metoprolol Tartrate 25 mg oral tablet: 0.5 tab(s) orally 2 times a day  NovoLOG FlexPen 100 units/mL injectable solution: Inject 5 units at BS over 200, 10 units at BS over 300, and 15 units at BS over 400  Nucala 100 mg subcutaneous injection: 100 milligram(s) subcutaneous every 4 weeks    *Last given 4/30/21*  predniSONE: 4 milligram(s) orally once a day  rosuvastatin 20 mg oral tablet: 1 tab(s) orally once a day (at bedtime)  tamsulosin 0.4 mg oral capsule: 1 cap(s) orally once a day (at bedtime)  Ventolin HFA 90 mcg/inh inhalation aerosol: 2 puff(s) inhaled every 6 hours, As Needed  Vitamin B Complex 100: 1 tab(s) orally once a day  Vitamin C: 1 tab(s) orally once a day  Vitamin D3: 1 tab(s) orally once a day  Zinc: 1 tab(s) orally once a day   apixaban 5 mg oral tablet: 1 tab(s) orally every 12 hours  aspirin 81 mg oral delayed release tablet: 1 tab(s) orally once a day for  2weeks and then off   Breo Ellipta 200 mcg-25 mcg/inh inhalation powder: 1 puff(s) inhaled once a day  clopidogrel 75 mg oral tablet: 1 tab(s) orally once a day  Incruse Ellipta 62.5 mcg/inh inhalation powder: 1 puff(s) inhaled every 24 hours  Jardiance 25 mg oral tablet: 1 tab(s) orally once a day (in the morning)  losartan 25 mg oral tablet: 1 tab(s) orally once a day  metFORMIN 1000 mg oral tablet: 1 tab(s) orally 2 times a day w/food  please resume the dose on 08/10/2021   metoprolol tartrate 50 mg oral tablet: 1 tab(s) orally 2 times a day  NovoLOG FlexPen 100 units/mL injectable solution: Inject 5 units at BS over 200, 10 units at BS over 300, and 15 units at BS over 400  Nucala 100 mg subcutaneous injection: 100 milligram(s) subcutaneous every 4 weeks    *Last given 4/30/21*  predniSONE: 4 milligram(s) orally once a day  rosuvastatin 20 mg oral tablet: 1 tab(s) orally once a day (at bedtime)  tamsulosin 0.4 mg oral capsule: 1 cap(s) orally once a day (at bedtime)  Ventolin HFA 90 mcg/inh inhalation aerosol: 2 puff(s) inhaled every 6 hours, As Needed

## 2021-08-07 NOTE — PROVIDER CONTACT NOTE (OTHER) - DATE AND TIME:
03-Aug-2021 01:40
03-Aug-2021 23:26
07-Aug-2021 14:00
03-Aug-2021 20:00
05-Aug-2021 01:00
05-Aug-2021 17:57

## 2021-08-07 NOTE — PROGRESS NOTE ADULT - PROVIDER SPECIALTY LIST ADULT
Electrophysiology
Pulmonology
Internal Medicine
Pulmonology
Internal Medicine

## 2021-08-07 NOTE — DISCHARGE NOTE PROVIDER - NSDCFUSCHEDAPPT_GEN_ALL_CORE_FT
YUE COTTO ; 09/27/2021 ; NPP [address] ; 09/27/2021 ; NP [address] ; 09/27/2021 ; [facility] Surg [address]asc [address] ; 10/05/2021 ; [facility] Pul[address]

## 2021-08-07 NOTE — DISCHARGE NOTE NURSING/CASE MANAGEMENT/SOCIAL WORK - NSDCVIVACCINE_GEN_ALL_CORE_FT
influenza, injectable, quadrivalent, preservative free; 18-Oct-2019 13:33; Faiza Mclaughlin (RN); GlaxEvolve IPKline; [id number] (Exp. Date: 30-Jun-2020); IntraMuscular; Deltoid Left.; 0.5 milliLiter(s); VIS (VIS Published: 15-Aug-2019, VIS Presented: 18-Oct-2019);   influenza, injectable, quadrivalent, preservative free; 26-Sep-2020 15:14; Cory Crooks (RN); GlaxEvolve IPKline; [id number] (Exp. Date: 30-Jun-2021); IntraMuscular; Deltoid Left.; 0.5 milliLiter(s); VIS (VIS Published: 15-Aug-2019, VIS Presented: 26-Sep-2020);

## 2021-08-07 NOTE — PROGRESS NOTE ADULT - SUBJECTIVE AND OBJECTIVE BOX
Date of Service  : 08-05-21 @ 16:17    INTERVAL HPI/OVERNIGHT EVENTS: No new concerns.   Vital Signs Last 24 Hrs  T(C): 36.4 (05 Aug 2021 11:48), Max: 36.7 (04 Aug 2021 20:18)  T(F): 97.6 (05 Aug 2021 11:48), Max: 98 (04 Aug 2021 20:18)  HR: 92 (05 Aug 2021 16:10) (81 - 98)  BP: 108/68 (05 Aug 2021 11:48) (105/65 - 117/83)  BP(mean): --  RR: 18 (05 Aug 2021 11:48) (18 - 18)  SpO2: 99% (05 Aug 2021 16:10) (97% - 100%)  I&O's Summary    04 Aug 2021 07:01  -  05 Aug 2021 07:00  --------------------------------------------------------  IN: 480 mL / OUT: 2000 mL / NET: -1520 mL    05 Aug 2021 07:01  -  05 Aug 2021 16:17  --------------------------------------------------------  IN: 420 mL / OUT: 650 mL / NET: -230 mL      MEDICATIONS  (STANDING):  apixaban 5 milliGRAM(s) Oral two times a day  atorvastatin 20 milliGRAM(s) Oral at bedtime  budesonide 160 MICROgram(s)/formoterol 4.5 MICROgram(s) Inhaler 2 Puff(s) Inhalation two times a day  clopidogrel Tablet 75 milliGRAM(s) Oral daily  dextrose 40% Gel 15 Gram(s) Oral once  dextrose 5%. 1000 milliLiter(s) (50 mL/Hr) IV Continuous <Continuous>  dextrose 5%. 1000 milliLiter(s) (100 mL/Hr) IV Continuous <Continuous>  dextrose 50% Injectable 25 Gram(s) IV Push once  dextrose 50% Injectable 12.5 Gram(s) IV Push once  dextrose 50% Injectable 25 Gram(s) IV Push once  glucagon  Injectable 1 milliGRAM(s) IntraMuscular once  insulin lispro (ADMELOG) corrective regimen sliding scale   SubCutaneous three times a day before meals  insulin lispro (ADMELOG) corrective regimen sliding scale   SubCutaneous at bedtime  losartan 25 milliGRAM(s) Oral daily  metoprolol tartrate 50 milliGRAM(s) Oral two times a day  predniSONE   Tablet 4 milliGRAM(s) Oral daily  tamsulosin 0.4 milliGRAM(s) Oral at bedtime  tiotropium 18 MICROgram(s) Capsule 1 Capsule(s) Inhalation daily    MEDICATIONS  (PRN):  levalbuterol Inhalation 0.63 milliGRAM(s) Inhalation every 8 hours PRN wheezing / shortness of breath    LABS:    08-05    138  |  102  |  28<H>  ----------------------------<  165<H>  3.8   |  24  |  1.37<H>    Ca    9.5      05 Aug 2021 07:37  Phos  3.3     08-04  Mg     2.0     08-05          CAPILLARY BLOOD GLUCOSE      POCT Blood Glucose.: 241 mg/dL (05 Aug 2021 12:46)  POCT Blood Glucose.: 254 mg/dL (04 Aug 2021 21:24)  POCT Blood Glucose.: 183 mg/dL (04 Aug 2021 17:12)          REVIEW OF SYSTEMS:  CONSTITUTIONAL: No fever, weight loss, or fatigue  EYES: No eye pain, visual disturbances, or discharge  ENMT:  No difficulty hearing, tinnitus, vertigo; No sinus or throat pain  NECK: No pain or stiffness  RESPIRATORY: No cough, wheezing, chills or hemoptysis; No shortness of breath  CARDIOVASCULAR: No chest pain, palpitations, dizziness, or leg swelling  GASTROINTESTINAL: No abdominal or epigastric pain. No nausea, vomiting, or hematemesis; No diarrhea or constipation. No melena or hematochezia.  GENITOURINARY: No dysuria, frequency, hematuria, or incontinence  NEUROLOGICAL: No headaches, memory loss, loss of strength, numbness, or tremors      Consultant(s) Notes Reviewed:  [x ] YES  [ ] NO    PHYSICAL EXAM:  GENERAL: NAD, well-groomed, well-developed, not in any distress ,  HEAD:  Atraumatic, Normocephalic  EYES: EOMI, PERRLA, conjunctiva and sclera clear  ENMT: No tonsillar erythema, exudates, or enlargement; Moist mucous membranes, Good dentition, No lesions  NECK: Supple, No JVD, Normal thyroid  NERVOUS SYSTEM:  Alert & Oriented X3, No focal deficit   CHEST/LUNG: Good air entry bilateral with no  rales, rhonchi, wheezing, or rubs  HEART: Regular rate and rhythm; No murmurs, rubs, or gallops  ABDOMEN: Soft, Nontender, Nondistended; Bowel sounds present  EXTREMITIES:  2+ Peripheral Pulses, No clubbing, cyanosis, or edema  SKIN: No rashes or lesions    Care Discussed with Consultants/Other Providers [ x] YES  [ ] NO
Date of Service  : 08-07-21 @ 15:12    INTERVAL HPI/OVERNIGHT EVENTS: I feel fine.   Vital Signs Last 24 Hrs  T(C): 36.5 (07 Aug 2021 14:00), Max: 36.9 (06 Aug 2021 20:00)  T(F): 97.7 (07 Aug 2021 14:00), Max: 98.4 (06 Aug 2021 20:00)  HR: 77 (07 Aug 2021 14:00) (67 - 140)  BP: 101/65 (07 Aug 2021 14:00) (97/64 - 147/65)  BP(mean): --  RR: 18 (07 Aug 2021 14:00) (15 - 25)  SpO2: 98% (07 Aug 2021 14:00) (92% - 100%)  I&O's Summary    06 Aug 2021 07:01  -  07 Aug 2021 07:00  --------------------------------------------------------  IN: 240 mL / OUT: 5120 mL / NET: -4880 mL    07 Aug 2021 07:01  -  07 Aug 2021 15:12  --------------------------------------------------------  IN: 780 mL / OUT: 200 mL / NET: 580 mL      MEDICATIONS  (STANDING):  apixaban 5 milliGRAM(s) Oral every 12 hours  aspirin enteric coated 81 milliGRAM(s) Oral daily  atorvastatin 20 milliGRAM(s) Oral at bedtime  budesonide 160 MICROgram(s)/formoterol 4.5 MICROgram(s) Inhaler 2 Puff(s) Inhalation two times a day  clopidogrel Tablet 75 milliGRAM(s) Oral daily  dextrose 40% Gel 15 Gram(s) Oral once  dextrose 5%. 1000 milliLiter(s) (50 mL/Hr) IV Continuous <Continuous>  dextrose 5%. 1000 milliLiter(s) (100 mL/Hr) IV Continuous <Continuous>  dextrose 50% Injectable 25 Gram(s) IV Push once  dextrose 50% Injectable 12.5 Gram(s) IV Push once  dextrose 50% Injectable 25 Gram(s) IV Push once  glucagon  Injectable 1 milliGRAM(s) IntraMuscular once  insulin lispro (ADMELOG) corrective regimen sliding scale   SubCutaneous three times a day before meals  insulin lispro (ADMELOG) corrective regimen sliding scale   SubCutaneous at bedtime  losartan 25 milliGRAM(s) Oral daily  metoprolol tartrate 50 milliGRAM(s) Oral two times a day  predniSONE   Tablet 4 milliGRAM(s) Oral daily  tamsulosin 0.4 milliGRAM(s) Oral at bedtime  tiotropium 18 MICROgram(s) Capsule 1 Capsule(s) Inhalation daily    MEDICATIONS  (PRN):  levalbuterol Inhalation 0.63 milliGRAM(s) Inhalation every 8 hours PRN wheezing / shortness of breath    LABS:    08-07    140  |  105  |  22  ----------------------------<  185<H>  4.1   |  24  |  1.24    Ca    9.4      07 Aug 2021 07:25          CAPILLARY BLOOD GLUCOSE      POCT Blood Glucose.: 324 mg/dL (07 Aug 2021 12:36)  POCT Blood Glucose.: 204 mg/dL (07 Aug 2021 08:46)  POCT Blood Glucose.: 240 mg/dL (06 Aug 2021 21:45)  POCT Blood Glucose.: 143 mg/dL (06 Aug 2021 19:57)  POCT Blood Glucose.: 199 mg/dL (06 Aug 2021 15:53)          REVIEW OF SYSTEMS:  CONSTITUTIONAL: No fever, weight loss, or fatigue  EYES: No eye pain, visual disturbances, or discharge  ENMT:  No difficulty hearing, tinnitus, vertigo; No sinus or throat pain  NECK: No pain or stiffness  RESPIRATORY: No cough, wheezing, chills or hemoptysis; No shortness of breath  CARDIOVASCULAR: No chest pain, palpitations, dizziness, or leg swelling  GASTROINTESTINAL: No abdominal or epigastric pain. No nausea, vomiting, or hematemesis; No diarrhea or constipation. No melena or hematochezia.  GENITOURINARY: No dysuria, frequency, hematuria, or incontinence  NEUROLOGICAL: No headaches, memory loss, loss of strength, numbness, or tremors      Consultant(s) Notes Reviewed:  [x ] YES  [ ] NO    PHYSICAL EXAM:  GENERAL: NAD, well-groomed, well-developed,not in any distress ,  HEAD:  Atraumatic, Normocephalic  EYES: EOMI, PERRLA, conjunctiva and sclera clear  ENMT: No tonsillar erythema, exudates, or enlargement; Moist mucous membranes, Good dentition, No lesions  NECK: Supple, No JVD, Normal thyroid  NERVOUS SYSTEM:  Alert & Oriented X3, No focal deficit   CHEST/LUNG: Good air entry bilateral with no  rales, rhonchi, wheezing, or rubs  HEART: Regular rate and rhythm; No murmurs, rubs, or gallops  ABDOMEN: Soft, Nontender, Nondistended; Bowel sounds present  EXTREMITIES:  2+ Peripheral Pulses, No clubbing, cyanosis, or edema  SKIN: No rashes or lesions    Care Discussed with Consultants/Other Providers [ x] YES  [ ] NO
Date of Service: 21 @ 14:51    Patient is a 81y old  Male who presents with a chief complaint of Fast Heart Rate (03 Aug 2021 12:16)    Any change in ROS:   Seen with wife at bedside  O2 sats 98% on 2LNC (baseline requirements @ home)  Bipap qhs  Denies CP, SOB    MEDICATIONS  (STANDING):  apixaban 5 milliGRAM(s) Oral two times a day  atorvastatin 20 milliGRAM(s) Oral at bedtime  budesonide 160 MICROgram(s)/formoterol 4.5 MICROgram(s) Inhaler 2 Puff(s) Inhalation two times a day  clopidogrel Tablet 75 milliGRAM(s) Oral daily  dextrose 40% Gel 15 Gram(s) Oral once  dextrose 5%. 1000 milliLiter(s) (50 mL/Hr) IV Continuous <Continuous>  dextrose 5%. 1000 milliLiter(s) (100 mL/Hr) IV Continuous <Continuous>  dextrose 50% Injectable 25 Gram(s) IV Push once  dextrose 50% Injectable 12.5 Gram(s) IV Push once  dextrose 50% Injectable 25 Gram(s) IV Push once  glucagon  Injectable 1 milliGRAM(s) IntraMuscular once  insulin lispro (ADMELOG) corrective regimen sliding scale   SubCutaneous three times a day before meals  insulin lispro (ADMELOG) corrective regimen sliding scale   SubCutaneous at bedtime  losartan 25 milliGRAM(s) Oral daily  metoprolol tartrate 25 milliGRAM(s) Oral two times a day  predniSONE   Tablet 4 milliGRAM(s) Oral daily  tamsulosin 0.4 milliGRAM(s) Oral at bedtime    MEDICATIONS  (PRN):  albuterol/ipratropium for Nebulization 3 milliLiter(s) Nebulizer every 6 hours PRN Shortness of Breath and/or Wheezing    Vital Signs Last 24 Hrs  T(C): 36.7 (03 Aug 2021 08:55), Max: 37.1 (02 Aug 2021 21:53)  T(F): 98 (03 Aug 2021 08:55), Max: 98.8 (02 Aug 2021 21:53)  HR: 100 (03 Aug 2021 09:09) (99 - 114)  BP: 134/73 (03 Aug 2021 08:55) (104/73 - 156/93)  BP(mean): 81 (03 Aug 2021 05:03) (81 - 113)  RR: 20 (03 Aug 2021 08:55) (19 - 25)  SpO2: 95% (03 Aug 2021 09:09) (95% - 100%)    I&O's Summary    02 Aug 2021 07:01  -  03 Aug 2021 07:00  --------------------------------------------------------  IN: 0 mL / OUT: 225 mL / NET: -225 mL    03 Aug 2021 07:01  -  03 Aug 2021 14:51  --------------------------------------------------------  IN: 480 mL / OUT: 500 mL / NET: -20 mL    Physical Exam:   GENERAL: NAD  HEENT: VIOLETA  ENMT: No tonsillar erythema, exudates, or enlargement  NECK: Supple, No JVD  CHEST/LUNG: Clear to auscultation b/l  CVS: Regular rate and rhythm  GI: : Soft, Nontender, Nondistended  NERVOUS SYSTEM:  Alert & Oriented X3  EXTREMITIES:  2+ Peripheral Pulses, No clubbing, cyanosis, or edema  LYMPH: No lymphadenopathy noted  SKIN: No rashes or lesions  PSYCH: Appropriate    Labs:  ABG - ( 03 Aug 2021 03:16 )  pH, Arterial: 7.40  pH, Blood: x     /  pCO2: 46    /  pO2: 106   / HCO3: 28    / Base Excess: 3.1   /  SaO2: 98        30                            12.3   9.56  )-----------( 340      ( 02 Aug 2021 15:26 )             42.3         143  |  106  |  19  ----------------------------<  144<H>  4.2   |  27  |  1.24      141  |  103  |  20  ----------------------------<  180<H>  4.7   |  26  |  1.33<H>    Ca    10.3      03 Aug 2021 02:25  Ca    10.6<H>      02 Aug 2021 15:26    TPro  6.9  /  Alb  4.1  /  TBili  0.2  /  DBili  x   /  AST  9<L>  /  ALT  6<L>  /  AlkPhos  98      CAPILLARY BLOOD GLUCOSE  POCT Blood Glucose.: 208 mg/dL (03 Aug 2021 11:02)  POCT Blood Glucose.: 164 mg/dL (03 Aug 2021 07:11)  POCT Blood Glucose.: 192 mg/dL (02 Aug 2021 22:32)  POCT Blood Glucose.: 154 mg/dL (02 Aug 2021 17:49)    LIVER FUNCTIONS - ( 02 Aug 2021 15:26 )  Alb: 4.1 g/dL / Pro: 6.9 g/dL / ALK PHOS: 98 U/L / ALT: 6 U/L / AST: 9 U/L / GGT: x           Urinalysis Basic - ( 02 Aug 2021 16:26 )    Color: Light Yellow / Appearance: Clear / S.032 / pH: x  Gluc: x / Ketone: Small  / Bili: Negative / Urobili: Negative   Blood: x / Protein: Trace / Nitrite: Negative   Leuk Esterase: Negative / RBC: 1 /hpf / WBC 1 /HPF   Sq Epi: x / Non Sq Epi: 1 /hpf / Bacteria: Negative    Studies  Chest X-RAY < from: Xray Chest 2 Views PA/Lat (21 @ 15:17) >    INTERPRETATION:  Chest two view    HISTORY: Chest pain    COMPARISON: 2020    Radiographic examination shows the heart to be small in size. The lungs are hyperinflated but clear. Sternal wires are again noted.    There is no evidence of focal infiltrate, pneumothorax or pleural effusion.    IMPRESSION: COPD.    < end of copied text >                
Cardiology    HISTORY OF PRESENT ILLNESS:   Mr gloria is a 81yoM referred from Dr Rod office with weakness/unsteady gait and profuse sweating.  Sent to ED for further workup.  He has severe COPD, requiring intermittent supplemental oxygen (2L NC), history of CABG ~20yrs ago, coronary stenting, and also above-the-knee vascular stenting done in 2019.  He states he has had no fevers, chills, blurry vision, orthopnea, angina, palpitations, worsening SOB, dizziness and fainting, worsening of leg pain, burning with urination, nausea, vomiting, change in appetite or diarrhea.  A 10 pt ROS is otherwise negative.      8/3- seen this morning in [facility] holding area.  Subjectively, he states uneventful overnight. No palpitations, angina, sweating, nausea.  We reviewed that telemetry showed a few runs of SVT and AFib.      albuterol/ipratropium for Nebulization 3 milliLiter(s) Nebulizer every 6 hours PRN  aspirin enteric coated 81 milliGRAM(s) Oral daily  atorvastatin 20 milliGRAM(s) Oral at bedtime  budesonide 160 MICROgram(s)/formoterol 4.5 MICROgram(s) Inhaler 2 Puff(s) Inhalation two times a day  clopidogrel Tablet 75 milliGRAM(s) Oral daily  dextrose 40% Gel 15 Gram(s) Oral once  dextrose 5%. 1000 milliLiter(s) IV Continuous <Continuous>  dextrose 5%. 1000 milliLiter(s) IV Continuous <Continuous>  dextrose 50% Injectable 25 Gram(s) IV Push once  dextrose 50% Injectable 12.5 Gram(s) IV Push once  dextrose 50% Injectable 25 Gram(s) IV Push once  glucagon  Injectable 1 milliGRAM(s) IntraMuscular once  heparin   Injectable 5000 Unit(s) SubCutaneous every 12 hours  insulin lispro (ADMELOG) corrective regimen sliding scale   SubCutaneous three times a day before meals  insulin lispro (ADMELOG) corrective regimen sliding scale   SubCutaneous at bedtime  losartan 25 milliGRAM(s) Oral daily  metoprolol tartrate 25 milliGRAM(s) Oral two times a day  predniSONE   Tablet 4 milliGRAM(s) Oral daily  tamsulosin 0.4 milliGRAM(s) Oral at bedtime                            12.3   9.56  )-----------( 340      ( 02 Aug 2021 15:26 )             42.3       08-03    143  |  106  |  19  ----------------------------<  144<H>  4.2   |  27  |  1.24    Ca    10.3      03 Aug 2021 02:25    TPro  6.9  /  Alb  4.1  /  TBili  0.2  /  DBili  x   /  AST  9<L>  /  ALT  6<L>  /  AlkPhos  98  08-02      T(C): 36.7 (08-03-21 @ 08:55), Max: 37.1 (08-02-21 @ 21:53)  HR: 100 (08-03-21 @ 09:09) (99 - 119)  BP: 134/73 (08-03-21 @ 08:55) (104/73 - 156/93)  RR: 20 (08-03-21 @ 08:55) (19 - 25)  SpO2: 95% (08-03-21 @ 09:09) (95% - 100%)  Wt(kg): --    I&O's Summary    02 Aug 2021 07:01  -  03 Aug 2021 07:00  --------------------------------------------------------  IN: 0 mL / OUT: 225 mL / NET: -225 mL    03 Aug 2021 07:01  -  03 Aug 2021 11:44  --------------------------------------------------------  IN: 240 mL / OUT: 500 mL / NET: -260 mL    Appearance: well appearing AA male in no acute distress, pleasant and in good spirits  HEENT:   Normal oral mucosa, PERRL, EOMI	  Lymphatic: No lymphadenopathy , no edema  Cardiovascular: Normal S1 S2, No JVD, No murmurs , Peripheral pulses palpable 2+ bilaterally  Respiratory: Lungs clear to auscultation but with poor air entry BL, normal effort 	  Gastrointestinal:  Soft, Non-tender, + BS	  Skin: No rashes, No ecchymoses, No cyanosis, cool to touch, 1+ posterior tibialis pulses BL, radial arteries are 2+BL  Musculoskeletal: Normal range of motion, normal strength  Psychiatry:  Mood & affect appropriate    TELEMETRY: Episodes of AT and AFib, self-terminating.	    ECG: NSR 	  Echo: bedside limited echo with no pericardial effusion (ED staff)    ASSESSMENT/PLAN: 	81y Male with COPD, CAD and PAD, here with profuse sweating and gait instability.      Intake labs are overall reassuring.  On evening of admission and overnight he had a few salvos of SVT, AT and AFib, all self-terminating, and all entirely asymptomatic.  It is unclear if this is what he was experiencing when he felt unwell at the doctor's office, but he has been asymptomatic here at [facility].  Can use Metoprolol or Diltiazem for rate control. Would avoid Amiodarone given O2-dependent COPD.  Despite his lung issues, he is a fair candidate for EP study and ablation, should we prove a symptom-rhythm correlation.  If no symptoms in hospital, consider outpatient event monitoring.  Before discharge, plan to stop aspirin and start Apixaban 5mg BID for AF stroke prevention.      Sergio Payton M.D.  Cardiac Electrophysiology    office [phone]  pager [phone]
Date of Service  : 08-04-21 @ 16:37    INTERVAL HPI/OVERNIGHT EVENTS: I feel fine. Wife was in room.   Vital Signs Last 24 Hrs  T(C): 36.4 (04 Aug 2021 11:34), Max: 36.7 (03 Aug 2021 17:16)  T(F): 97.5 (04 Aug 2021 11:34), Max: 98.1 (04 Aug 2021 04:26)  HR: 78 (04 Aug 2021 15:51) (66 - 104)  BP: 113/74 (04 Aug 2021 13:45) (106/67 - 134/80)  BP(mean): --  RR: 18 (04 Aug 2021 13:45) (18 - 23)  SpO2: 98% (04 Aug 2021 15:51) (96% - 100%)  I&O's Summary    03 Aug 2021 07:01  -  04 Aug 2021 07:00  --------------------------------------------------------  IN: 720 mL / OUT: 1100 mL / NET: -380 mL    04 Aug 2021 07:01  -  04 Aug 2021 16:37  --------------------------------------------------------  IN: 480 mL / OUT: 700 mL / NET: -220 mL      MEDICATIONS  (STANDING):  apixaban 5 milliGRAM(s) Oral two times a day  atorvastatin 20 milliGRAM(s) Oral at bedtime  budesonide 160 MICROgram(s)/formoterol 4.5 MICROgram(s) Inhaler 2 Puff(s) Inhalation two times a day  clopidogrel Tablet 75 milliGRAM(s) Oral daily  dextrose 40% Gel 15 Gram(s) Oral once  dextrose 5%. 1000 milliLiter(s) (50 mL/Hr) IV Continuous <Continuous>  dextrose 5%. 1000 milliLiter(s) (100 mL/Hr) IV Continuous <Continuous>  dextrose 50% Injectable 25 Gram(s) IV Push once  dextrose 50% Injectable 12.5 Gram(s) IV Push once  dextrose 50% Injectable 25 Gram(s) IV Push once  glucagon  Injectable 1 milliGRAM(s) IntraMuscular once  insulin lispro (ADMELOG) corrective regimen sliding scale   SubCutaneous three times a day before meals  insulin lispro (ADMELOG) corrective regimen sliding scale   SubCutaneous at bedtime  losartan 25 milliGRAM(s) Oral daily  metoprolol tartrate 50 milliGRAM(s) Oral two times a day  predniSONE   Tablet 4 milliGRAM(s) Oral daily  tamsulosin 0.4 milliGRAM(s) Oral at bedtime  tiotropium 18 MICROgram(s) Capsule 1 Capsule(s) Inhalation daily    MEDICATIONS  (PRN):  levalbuterol Inhalation 0.63 milliGRAM(s) Inhalation every 8 hours PRN wheezing / shortness of breath    LABS:    08-04    138  |  102  |  20  ----------------------------<  162<H>  3.9   |  25  |  1.18    Ca    10.0      04 Aug 2021 05:55  Phos  3.3     08-04  Mg     1.9     08-04          CAPILLARY BLOOD GLUCOSE      POCT Blood Glucose.: 286 mg/dL (04 Aug 2021 12:39)  POCT Blood Glucose.: 173 mg/dL (04 Aug 2021 08:50)  POCT Blood Glucose.: 238 mg/dL (03 Aug 2021 21:15)  POCT Blood Glucose.: 182 mg/dL (03 Aug 2021 17:20)  POCT Blood Glucose.: 344 mg/dL (03 Aug 2021 17:15)      ABG - ( 03 Aug 2021 03:16 )  pH, Arterial: 7.40  pH, Blood: x     /  pCO2: 46    /  pO2: 106   / HCO3: 28    / Base Excess: 3.1   /  SaO2: 98                  REVIEW OF SYSTEMS:  CONSTITUTIONAL: No fever, weight loss, or fatigue  EYES: No eye pain, visual disturbances, or discharge  ENMT:  No difficulty hearing, tinnitus, vertigo; No sinus or throat pain  NECK: No pain or stiffness  RESPIRATORY: No cough, wheezing, chills or hemoptysis; No shortness of breath  CARDIOVASCULAR: No chest pain, palpitations, dizziness, or leg swelling  GASTROINTESTINAL: No abdominal or epigastric pain. No nausea, vomiting, or hematemesis; No diarrhea or constipation. No melena or hematochezia.  GENITOURINARY: No dysuria, frequency, hematuria, or incontinence  NEUROLOGICAL: No headaches, memory loss, loss of strength, numbness, or tremors  SKIN: No itching, burning, rashes, or lesions   ENDOCRINE: No heat or cold intolerance; No hair loss  MUSCULOSKELETAL: No joint pain or swelling; No muscle, back, or extremity pain  PSYCHIATRIC: No depression, anxiety, mood swings, or difficulty sleeping  HEME/LYMPH: No easy bruising, or bleeding gums  ALLERY AND IMMUNOLOGIC: No hives or eczema    RADIOLOGY & ADDITIONAL TESTS:    Consultant(s) Notes Reviewed:  [x ] YES  [ ] NO    PHYSICAL EXAM:  GENERAL: NAD, well-groomed, well-developed,not in any distress ,  HEAD:  Atraumatic, Normocephalic  EYES: EOMI, PERRLA, conjunctiva and sclera clear  ENMT: No tonsillar erythema, exudates, or enlargement; Moist mucous membranes, Good dentition, No lesions  NECK: Supple, No JVD, Normal thyroid  NERVOUS SYSTEM:  Alert & Oriented X3, No focal deficit   CHEST/LUNG: Good air entry bilateral with no  rales, rhonchi, wheezing, or rubs  HEART: Regular rate and rhythm; No murmurs, rubs, or gallops  ABDOMEN: Soft, Nontender, Nondistended; Bowel sounds present  EXTREMITIES:  2+ Peripheral Pulses, No clubbing, cyanosis, or edema  SKIN: No rashes or lesions    Care Discussed with Consultants/Other Providers [ x] YES  [ ] NO
Date of Service  : 21 @ 12:16    INTERVAL HPI/OVERNIGHT EVENTS: I feel fine and want to go home . Wife in room.   Vital Signs Last 24 Hrs  T(C): 36.7 (03 Aug 2021 08:55), Max: 37.1 (02 Aug 2021 21:53)  T(F): 98 (03 Aug 2021 08:55), Max: 98.8 (02 Aug 2021 21:53)  HR: 100 (03 Aug 2021 09:09) (99 - 119)  BP: 134/73 (03 Aug 2021 08:55) (104/73 - 156/93)  BP(mean): 81 (03 Aug 2021 05:03) (81 - 113)  RR: 20 (03 Aug 2021 08:55) (19 - 25)  SpO2: 95% (03 Aug 2021 09:09) (95% - 100%)  I&O's Summary    02 Aug 2021 07:01  -  03 Aug 2021 07:00  --------------------------------------------------------  IN: 0 mL / OUT: 225 mL / NET: -225 mL    03 Aug 2021 07:01  -  03 Aug 2021 12:16  --------------------------------------------------------  IN: 240 mL / OUT: 500 mL / NET: -260 mL      MEDICATIONS  (STANDING):  aspirin enteric coated 81 milliGRAM(s) Oral daily  atorvastatin 20 milliGRAM(s) Oral at bedtime  budesonide 160 MICROgram(s)/formoterol 4.5 MICROgram(s) Inhaler 2 Puff(s) Inhalation two times a day  clopidogrel Tablet 75 milliGRAM(s) Oral daily  dextrose 40% Gel 15 Gram(s) Oral once  dextrose 5%. 1000 milliLiter(s) (50 mL/Hr) IV Continuous <Continuous>  dextrose 5%. 1000 milliLiter(s) (100 mL/Hr) IV Continuous <Continuous>  dextrose 50% Injectable 25 Gram(s) IV Push once  dextrose 50% Injectable 12.5 Gram(s) IV Push once  dextrose 50% Injectable 25 Gram(s) IV Push once  glucagon  Injectable 1 milliGRAM(s) IntraMuscular once  heparin   Injectable 5000 Unit(s) SubCutaneous every 12 hours  insulin lispro (ADMELOG) corrective regimen sliding scale   SubCutaneous three times a day before meals  insulin lispro (ADMELOG) corrective regimen sliding scale   SubCutaneous at bedtime  losartan 25 milliGRAM(s) Oral daily  metoprolol tartrate 25 milliGRAM(s) Oral two times a day  predniSONE   Tablet 4 milliGRAM(s) Oral daily  tamsulosin 0.4 milliGRAM(s) Oral at bedtime    MEDICATIONS  (PRN):  albuterol/ipratropium for Nebulization 3 milliLiter(s) Nebulizer every 6 hours PRN Shortness of Breath and/or Wheezing    LABS:                        12.3   9.56  )-----------( 340      ( 02 Aug 2021 15:26 )             42.3     08-03    143  |  106  |  19  ----------------------------<  144<H>  4.2   |  27  |  1.24    Ca    10.3      03 Aug 2021 02:25    TPro  6.9  /  Alb  4.1  /  TBili  0.2  /  DBili  x   /  AST  9<L>  /  ALT  6<L>  /  AlkPhos  98  08-02      Urinalysis Basic - ( 02 Aug 2021 16:26 )    Color: Light Yellow / Appearance: Clear / S.032 / pH: x  Gluc: x / Ketone: Small  / Bili: Negative / Urobili: Negative   Blood: x / Protein: Trace / Nitrite: Negative   Leuk Esterase: Negative / RBC: 1 /hpf / WBC 1 /HPF   Sq Epi: x / Non Sq Epi: 1 /hpf / Bacteria: Negative      CAPILLARY BLOOD GLUCOSE      POCT Blood Glucose.: 208 mg/dL (03 Aug 2021 11:02)  POCT Blood Glucose.: 164 mg/dL (03 Aug 2021 07:11)  POCT Blood Glucose.: 192 mg/dL (02 Aug 2021 22:32)  POCT Blood Glucose.: 154 mg/dL (02 Aug 2021 17:49)      ABG - ( 03 Aug 2021 03:16 )  pH, Arterial: 7.40  pH, Blood: x     /  pCO2: 45    /  pO2: 106   / HCO3: 28    / Base Excess: 3.1   /  SaO2: 98                Urinalysis Basic - ( 02 Aug 2021 16:26 )    Color: Light Yellow / Appearance: Clear / S.032 / pH: x  Gluc: x / Ketone: Small  / Bili: Negative / Urobili: Negative   Blood: x / Protein: Trace / Nitrite: Negative   Leuk Esterase: Negative / RBC: 1 /hpf / WBC 1 /HPF   Sq Epi: x / Non Sq Epi: 1 /hpf / Bacteria: Negative      REVIEW OF SYSTEMS:  CONSTITUTIONAL: No fever, weight loss, or fatigue  EYES: No eye pain, visual disturbances, or discharge  ENMT:  No difficulty hearing, tinnitus, vertigo; No sinus or throat pain  NECK: No pain or stiffness  RESPIRATORY: No cough, wheezing, chills or hemoptysis; No shortness of breath  CARDIOVASCULAR: No chest pain, palpitations, dizziness, or leg swelling  GASTROINTESTINAL: No abdominal or epigastric pain. No nausea, vomiting, or hematemesis; No diarrhea or constipation. No melena or hematochezia.  GENITOURINARY: No dysuria, frequency, hematuria, or incontinence  NEUROLOGICAL: No headaches, memory loss, loss of strength, numbness, or tremors      Consultant(s) Notes Reviewed:  [x ] YES  [ ] NO    PHYSICAL EXAM:  GENERAL: NAD, well-groomed, well-developed,not in any distress ,  HEAD:  Atraumatic, Normocephalic  EYES: EOMI, PERRLA, conjunctiva and sclera clear  ENMT: No tonsillar erythema, exudates, or enlargement; Moist mucous membranes, Good dentition, No lesions  NECK: Supple, No JVD, Normal thyroid  NERVOUS SYSTEM:  Alert & Oriented X3, No focal deficit   CHEST/LUNG: Good air entry bilateral with no  rales, rhonchi, wheezing, or rubs  HEART: Regular rate and rhythm; No murmurs, rubs, or gallops  ABDOMEN: Soft, Nontender, Nondistended; Bowel sounds present  EXTREMITIES:  2+ Peripheral Pulses, No clubbing, cyanosis, or edema  SKIN: No rashes or lesions    Care Discussed with Consultants/Other Providers [ x] YES  [ ] NO
Cardiology    HISTORY OF PRESENT ILLNESS:   Mr gloria is a 81yoM referred from Dr Rod office with weakness/unsteady gait and profuse sweating.  Sent to ED for further workup.  He has severe COPD, requiring intermittent supplemental oxygen (2L NC), history of CABG ~20yrs ago, coronary stenting, and also above-the-knee vascular stenting done in 2019.  He states he has had no fevers, chills, blurry vision, orthopnea, angina, palpitations, worsening SOB, dizziness and fainting, worsening of leg pain, burning with urination, nausea, vomiting, change in appetite or diarrhea.  A 10 pt ROS is otherwise negative.      8/3- seen this morning in [facility] holding area.  Subjectively, he states uneventful overnight. No palpitations, angina, sweating, nausea.  We reviewed that telemetry showed a few runs of SVT and AFib.    8/4- before leaving CDU, had a few more episodes of PSVT, all asymptomatic. feels well today. wants to get up and walk, and plan on going home.    apixaban 5 milliGRAM(s) Oral two times a day  atorvastatin 20 milliGRAM(s) Oral at bedtime  budesonide 160 MICROgram(s)/formoterol 4.5 MICROgram(s) Inhaler 2 Puff(s) Inhalation two times a day  clopidogrel Tablet 75 milliGRAM(s) Oral daily  dextrose 40% Gel 15 Gram(s) Oral once  dextrose 5%. 1000 milliLiter(s) IV Continuous <Continuous>  dextrose 5%. 1000 milliLiter(s) IV Continuous <Continuous>  dextrose 50% Injectable 25 Gram(s) IV Push once  dextrose 50% Injectable 12.5 Gram(s) IV Push once  dextrose 50% Injectable 25 Gram(s) IV Push once  glucagon  Injectable 1 milliGRAM(s) IntraMuscular once  insulin lispro (ADMELOG) corrective regimen sliding scale   SubCutaneous three times a day before meals  insulin lispro (ADMELOG) corrective regimen sliding scale   SubCutaneous at bedtime  levalbuterol Inhalation 0.63 milliGRAM(s) Inhalation every 8 hours PRN  losartan 25 milliGRAM(s) Oral daily  metoprolol tartrate 50 milliGRAM(s) Oral two times a day  predniSONE   Tablet 4 milliGRAM(s) Oral daily  tamsulosin 0.4 milliGRAM(s) Oral at bedtime  tiotropium 18 MICROgram(s) Capsule 1 Capsule(s) Inhalation daily                            12.3   9.56  )-----------( 340      ( 02 Aug 2021 15:26 )             42.3       08-04    138  |  102  |  20  ----------------------------<  162<H>  3.9   |  25  |  1.18    Ca    10.0      04 Aug 2021 05:55  Phos  3.3     08-04  Mg     1.9     08-04    TPro  6.9  /  Alb  4.1  /  TBili  0.2  /  DBili  x   /  AST  9<L>  /  ALT  6<L>  /  AlkPhos  98  08-02    T(C): 36.7 (08-04-21 @ 04:26), Max: 36.7 (08-03-21 @ 17:16)  HR: 80 (08-04-21 @ 10:44) (66 - 104)  BP: 127/74 (08-04-21 @ 04:26) (106/67 - 134/80)  RR: 18 (08-04-21 @ 04:26) (18 - 23)  SpO2: 98% (08-04-21 @ 10:44) (96% - 100%)  Wt(kg): --    I&O's Summary    03 Aug 2021 07:01  -  04 Aug 2021 07:00  --------------------------------------------------------  IN: 720 mL / OUT: 1100 mL / NET: -380 mL    04 Aug 2021 07:01  -  04 Aug 2021 11:01  --------------------------------------------------------  IN: 240 mL / OUT: 400 mL / NET: -160 mL    Appearance: well appearing AA male in no acute distress, pleasant and in good spirits  HEENT:   Normal oral mucosa, PERRL, EOMI	  Lymphatic: No lymphadenopathy , no edema  Cardiovascular: Normal S1 S2, No JVD, No murmurs , Peripheral pulses palpable 2+ bilaterally  Respiratory: Lungs clear to auscultation but with poor air entry BL, normal effort 	  Gastrointestinal:  Soft, Non-tender, + BS	  Skin: No rashes, No ecchymoses, No cyanosis, cool to touch, 1+ posterior tibialis pulses BL, radial arteries are 2+BL  Musculoskeletal: Normal range of motion, normal strength  Psychiatry:  Mood & affect appropriate    TELEMETRY: Episodes of AT and AFib, self-terminating.	    ECG: NSR 	  Echo: bedside limited echo with no pericardial effusion (ED staff)  Echo:   1. Mitral valve not well visualized, probably normal. Minimal mitral regurgitation.  2. Calcified trileaflet aortic valve with normal opening. No aortic valve regurgitation seen.  3. Mild segmental left ventricular systolic dysfunction. The ejection fraction varies with the R-R interval.  The  apical inferior wall, the apical septum, the apical lateral wall, the basal inferior wall, the mid anterior wall, and  the mid inferior wall are hypokinetic.  Septal motion consistent with cardiac surgery.  4. Mild diastolic dysfunction (Stage I).  5. Normal right ventricular size and function.  *** Compared with echocardiogram of 6/14/2018, there has been an interval decline in left ventricular systolic  function.    ASSESSMENT/PLAN: 	81y Male with COPD, CAD and PAD, here with profuse sweating and gait instability.      Intake labs are overall reassuring.  On evening of admission and overnight he had a few salvos of SVT, AT and AFib, all self-terminating, and all entirely asymptomatic.  It is unclear if this is what he was experiencing when he felt unwell at the doctor's office, but he has been asymptomatic here at [facility].  Can use Metoprolol or Diltiazem for rate control. Would avoid Amiodarone given O2-dependent COPD.  Despite his lung issues, he is a fair candidate for EP study and ablation, should we prove a symptom-rhythm correlation.  If no symptoms in hospital, consider outpatient event monitoring.  Before discharge, plan to stop aspirin and start Apixaban 5mg BID for AF stroke prevention.    Echo with reduced LV EF (45-50%) compared to normal EF from 2018, 2019 and 2020 office echos.  Will order pharmacologic stress testing.  Hx 1-block exercise limitation due to SOB (COPD) and LE claudication.      Sergio Payton M.D.  Cardiac Electrophysiology    office [phone]  pager [phone]
Cardiology    HISTORY OF PRESENT ILLNESS:   Mr gloria is a 81yoM referred from Dr Rod office with weakness/unsteady gait and profuse sweating.  Sent to ED for further workup.  He has severe COPD, requiring intermittent supplemental oxygen (2L NC), history of CABG ~20yrs ago, coronary stenting, and also above-the-knee vascular stenting done in 2019.  He states he has had no fevers, chills, blurry vision, orthopnea, angina, palpitations, worsening SOB, dizziness and fainting, worsening of leg pain, burning with urination, nausea, vomiting, change in appetite or diarrhea.  A 10 pt ROS is otherwise negative.      8/3- seen this morning in [facility] holding area.  Subjectively, he states uneventful overnight. No palpitations, angina, sweating, nausea.  We reviewed that telemetry showed a few runs of SVT and AFib.    8/4- before leaving CDU, had a few more episodes of PSVT, all asymptomatic. feels well today. wants to get up and walk, and plan on going home.  8/5- no complaints overnight. scheduled for stress testing today.    apixaban 5 milliGRAM(s) Oral two times a day  atorvastatin 20 milliGRAM(s) Oral at bedtime  budesonide 160 MICROgram(s)/formoterol 4.5 MICROgram(s) Inhaler 2 Puff(s) Inhalation two times a day  clopidogrel Tablet 75 milliGRAM(s) Oral daily  dextrose 40% Gel 15 Gram(s) Oral once  dextrose 5%. 1000 milliLiter(s) IV Continuous <Continuous>  dextrose 5%. 1000 milliLiter(s) IV Continuous <Continuous>  dextrose 50% Injectable 25 Gram(s) IV Push once  dextrose 50% Injectable 12.5 Gram(s) IV Push once  dextrose 50% Injectable 25 Gram(s) IV Push once  glucagon  Injectable 1 milliGRAM(s) IntraMuscular once  insulin lispro (ADMELOG) corrective regimen sliding scale   SubCutaneous three times a day before meals  insulin lispro (ADMELOG) corrective regimen sliding scale   SubCutaneous at bedtime  levalbuterol Inhalation 0.63 milliGRAM(s) Inhalation every 8 hours PRN  losartan 25 milliGRAM(s) Oral daily  metoprolol tartrate 50 milliGRAM(s) Oral two times a day  predniSONE   Tablet 4 milliGRAM(s) Oral daily  tamsulosin 0.4 milliGRAM(s) Oral at bedtime  tiotropium 18 MICROgram(s) Capsule 1 Capsule(s) Inhalation daily          08-05    138  |  102  |  28<H>  ----------------------------<  165<H>  3.8   |  24  |  1.37<H>    Ca    9.5      05 Aug 2021 07:37  Phos  3.3     08-04  Mg     2.0     08-05    T(C): 36.4 (08-05-21 @ 04:12), Max: 36.7 (08-04-21 @ 20:18)  HR: 94 (08-05-21 @ 10:35) (78 - 98)  BP: 107/69 (08-05-21 @ 04:12) (105/65 - 117/83)  RR: 18 (08-05-21 @ 04:12) (18 - 18)  SpO2: 98% (08-05-21 @ 10:35) (97% - 100%)  Wt(kg): --    I&O's Summary    04 Aug 2021 07:01  -  05 Aug 2021 07:00  --------------------------------------------------------  IN: 480 mL / OUT: 2000 mL / NET: -1520 mL    05 Aug 2021 07:01  -  05 Aug 2021 11:09  --------------------------------------------------------  IN: 0 mL / OUT: 300 mL / NET: -300 mL    Appearance: well appearing AA male in no acute distress, pleasant and in good spirits  HEENT:   Normal oral mucosa, PERRL, EOMI	  Lymphatic: No lymphadenopathy , no edema  Cardiovascular: Normal S1 S2, No JVD, No murmurs , Peripheral pulses palpable 2+ bilaterally  Respiratory: Lungs clear to auscultation but with poor air entry BL, normal effort 	  Gastrointestinal:  Soft, Non-tender, + BS	  Skin: No rashes, No ecchymoses, No cyanosis, cool to touch, 1+ posterior tibialis pulses BL, radial arteries are 2+BL  Musculoskeletal: Normal range of motion, normal strength  Psychiatry:  Mood & affect appropriate    TELEMETRY: Episodes of AT and AFib, self-terminating.	    ECG: NSR 	  Echo: bedside limited echo with no pericardial effusion (ED staff)  Echo:   1. Mitral valve not well visualized, probably normal. Minimal mitral regurgitation.  2. Calcified trileaflet aortic valve with normal opening. No aortic valve regurgitation seen.  3. Mild segmental left ventricular systolic dysfunction. The ejection fraction varies with the R-R interval.  The  apical inferior wall, the apical septum, the apical lateral wall, the basal inferior wall, the mid anterior wall, and  the mid inferior wall are hypokinetic.  Septal motion consistent with cardiac surgery.  4. Mild diastolic dysfunction (Stage I).  5. Normal right ventricular size and function.  *** Compared with echocardiogram of 6/14/2018, there has been an interval decline in left ventricular systolic  function.    ASSESSMENT/PLAN: 	81y Male with COPD, CAD and PAD, here with profuse sweating and gait instability.      Intake labs are overall reassuring.  On evening of admission and overnight he had a few salvos of SVT, AT and AFib, all self-terminating, and all entirely asymptomatic.  It is unclear if this is what he was experiencing when he felt unwell at the doctor's office, but he has been asymptomatic here at [facility].  Can use Metoprolol or Diltiazem for rate control. Would avoid Amiodarone given O2-dependent COPD.  Despite his lung issues, he is a fair candidate for EP study and ablation, should we prove a symptom-rhythm correlation.  If no symptoms in hospital, consider outpatient event monitoring.  Before discharge, plan to stop aspirin and start Apixaban 5mg BID for AF stroke prevention.    Echo with reduced LV EF (45-50%) compared to normal EF from 2018, 2019 and 2020 office echos.  Will order pharmacologic stress testing.  Hx 1-block exercise limitation due to SOB (COPD) and LE claudication.    Sergio Payton M.D.  Cardiac Electrophysiology    office [phone]  pager [phone]
Cardiology    HISTORY OF PRESENT ILLNESS:   Mr gloria is a 81yoM referred from Dr Rod office with weakness/unsteady gait and profuse sweating.  Sent to ED for further workup.  He has severe COPD, requiring intermittent supplemental oxygen (2L NC), history of CABG ~20yrs ago, coronary stenting, and also above-the-knee vascular stenting done in 2019.  He states he has had no fevers, chills, blurry vision, orthopnea, angina, palpitations, worsening SOB, dizziness and fainting, worsening of leg pain, burning with urination, nausea, vomiting, change in appetite or diarrhea.  A 10 pt ROS is otherwise negative.      8/3- seen this morning in [facility] holding area.  Subjectively, he states uneventful overnight. No palpitations, angina, sweating, nausea.  We reviewed that telemetry showed a few runs of SVT and AFib.    8/4- before leaving CDU, had a few more episodes of PSVT, all asymptomatic. feels well today. wants to get up and walk, and plan on going home.  8/5- no complaints overnight. scheduled for stress testing today.  8/6- stress test abnormal yestereday.  pending coronary angio today.    atorvastatin 20 milliGRAM(s) Oral at bedtime  budesonide 160 MICROgram(s)/formoterol 4.5 MICROgram(s) Inhaler 2 Puff(s) Inhalation two times a day  clopidogrel Tablet 75 milliGRAM(s) Oral daily  dextrose 40% Gel 15 Gram(s) Oral once  dextrose 5%. 1000 milliLiter(s) IV Continuous <Continuous>  dextrose 5%. 1000 milliLiter(s) IV Continuous <Continuous>  dextrose 50% Injectable 25 Gram(s) IV Push once  dextrose 50% Injectable 12.5 Gram(s) IV Push once  dextrose 50% Injectable 25 Gram(s) IV Push once  glucagon  Injectable 1 milliGRAM(s) IntraMuscular once  insulin lispro (ADMELOG) corrective regimen sliding scale   SubCutaneous three times a day before meals  insulin lispro (ADMELOG) corrective regimen sliding scale   SubCutaneous at bedtime  levalbuterol Inhalation 0.63 milliGRAM(s) Inhalation every 8 hours PRN  losartan 25 milliGRAM(s) Oral daily  metoprolol tartrate 50 milliGRAM(s) Oral two times a day  predniSONE   Tablet 4 milliGRAM(s) Oral daily  tamsulosin 0.4 milliGRAM(s) Oral at bedtime  tiotropium 18 MICROgram(s) Capsule 1 Capsule(s) Inhalation daily      08-06    140  |  105  |  23  ----------------------------<  275<H>  4.5   |  25  |  1.24    Ca    10.1      06 Aug 2021 11:18  Mg     2.0     08-05      T(C): 37 (08-06-21 @ 13:04), Max: 37 (08-06-21 @ 11:50)  HR: 88 (08-06-21 @ 13:04) (76 - 92)  BP: 124/58 (08-06-21 @ 13:04) (100/65 - 124/58)  RR: 20 (08-06-21 @ 13:04) (18 - 20)  SpO2: 96% (08-06-21 @ 13:04) (93% - 99%)  Wt(kg): --    I&O's Summary    05 Aug 2021 07:01  -  06 Aug 2021 07:00  --------------------------------------------------------  IN: 420 mL / OUT: 1550 mL / NET: -1130 mL    06 Aug 2021 07:01  -  06 Aug 2021 13:29  --------------------------------------------------------  IN: 240 mL / OUT: 400 mL / NET: -160 mL      Appearance: well appearing AA male in no acute distress, pleasant and in good spirits  HEENT:   Normal oral mucosa, PERRL, EOMI	  Lymphatic: No lymphadenopathy , no edema  Cardiovascular: Normal S1 S2, No JVD, No murmurs , Peripheral pulses palpable 2+ bilaterally  Respiratory: Lungs clear to auscultation but with poor air entry BL, normal effort 	  Gastrointestinal:  Soft, Non-tender, + BS	  Skin: No rashes, No ecchymoses, No cyanosis, cool to touch, 1+ posterior tibialis pulses BL, radial arteries are 2+BL  Musculoskeletal: Normal range of motion, normal strength  Psychiatry:  Mood & affect appropriate    TELEMETRY: Episodes of AT and AFib, self-terminating.	    ECG: NSR 	  Echo: bedside limited echo with no pericardial effusion (ED staff)  Echo:   1. Mitral valve not well visualized, probably normal. Minimal mitral regurgitation.  2. Calcified trileaflet aortic valve with normal opening. No aortic valve regurgitation seen.  3. Mild segmental left ventricular systolic dysfunction. The ejection fraction varies with the R-R interval.  The  apical inferior wall, the apical septum, the apical lateral wall, the basal inferior wall, the mid anterior wall, and  the mid inferior wall are hypokinetic.  Septal motion consistent with cardiac surgery.  4. Mild diastolic dysfunction (Stage I).  5. Normal right ventricular size and function.  *** Compared with echocardiogram of 6/14/2018, there has been an interval decline in left ventricular systolic  function.    Stress:  * The left ventricle was normal in size.  * There are medium-sized, moderate defects in the mid to distal anterolateral and distal anterior walls that are  mostly reversible suggestive of ischemia with partial scarring.  * There are medium-sized, mild to moderate defects in the anteroseptal and apical walls that are partially  reversible suggestive of infarct with moderate ajay-infarct ischemia.  * There are large, mild defects in the inferoseptal, mid to distal inferior, and distal inferolateral walls that  are mostly fixed suggestive of infarct with mild ajay-infarct ischemia.  * Post-stress gated wall motion analysis was performed (LVEF = 54 %;LVEDV = 83 ml.) revealing hypokinesis of the  septal, distal anterior, and apical walls and reduced systolic thickening of the mid to distal inferior and distal inferolateral walls.    ASSESSMENT/PLAN: 	81y Male with COPD, CAD and PAD, here with profuse sweating and gait instability.      Intake labs are overall reassuring.  On evening of admission and overnight he had a few salvos of SVT, AT and AFib, all self-terminating, and all entirely asymptomatic.  It is unclear if this is what he was experiencing when he felt unwell at the doctor's office, but he has been asymptomatic here at [facility].  Can use Metoprolol or Diltiazem for rate control. Would avoid Amiodarone given O2-dependent COPD.  Despite his lung issues, he is a fair candidate for EP study and ablation, should we prove a symptom-rhythm correlation.  If no symptoms in hospital, consider outpatient event monitoring.  Pending angiography results, may need triple-therapy (re: CAD + AFib)    Echo with reduced LV EF (45-50%) compared to normal EF from 2018, 2019 and 2020 office echos.  Abnormal stress.  Pending coronary angio today.    Sergio Payton M.D.  Cardiac Electrophysiology    office [phone]  pager [phone]
Cardiology    HISTORY OF PRESENT ILLNESS:   Mr gloria is a 81yoM referred from Dr Rod office with weakness/unsteady gait and profuse sweating.  Sent to ED for further workup.  He has severe COPD, requiring intermittent supplemental oxygen (2L NC), history of CABG ~20yrs ago, coronary stenting, and also above-the-knee vascular stenting done in 2019.  He states he has had no fevers, chills, blurry vision, orthopnea, angina, palpitations, worsening SOB, dizziness and fainting, worsening of leg pain, burning with urination, nausea, vomiting, change in appetite or diarrhea.  A 10 pt ROS is otherwise negative.      8/3- seen this morning in [facility] holding area.  Subjectively, he states uneventful overnight. No palpitations, angina, sweating, nausea.  We reviewed that telemetry showed a few runs of SVT and AFib.    8/4- before leaving CDU, had a few more episodes of PSVT, all asymptomatic. feels well today. wants to get up and walk, and plan on going home.  8/5- no complaints overnight. scheduled for stress testing today.  8/6- stress test abnormal yestereday.  pending coronary angio today.  8/7- s/p PCI to mid LAD.  went well. no angina or palpitations. walking well in the halls.    apixaban 5 milliGRAM(s) Oral every 12 hours  atorvastatin 20 milliGRAM(s) Oral at bedtime  budesonide 160 MICROgram(s)/formoterol 4.5 MICROgram(s) Inhaler 2 Puff(s) Inhalation two times a day  clopidogrel Tablet 75 milliGRAM(s) Oral daily  dextrose 40% Gel 15 Gram(s) Oral once  dextrose 5%. 1000 milliLiter(s) IV Continuous <Continuous>  dextrose 5%. 1000 milliLiter(s) IV Continuous <Continuous>  dextrose 50% Injectable 25 Gram(s) IV Push once  dextrose 50% Injectable 12.5 Gram(s) IV Push once  dextrose 50% Injectable 25 Gram(s) IV Push once  glucagon  Injectable 1 milliGRAM(s) IntraMuscular once  insulin lispro (ADMELOG) corrective regimen sliding scale   SubCutaneous three times a day before meals  insulin lispro (ADMELOG) corrective regimen sliding scale   SubCutaneous at bedtime  levalbuterol Inhalation 0.63 milliGRAM(s) Inhalation every 8 hours PRN  losartan 25 milliGRAM(s) Oral daily  metoprolol tartrate 50 milliGRAM(s) Oral two times a day  predniSONE   Tablet 4 milliGRAM(s) Oral daily  tamsulosin 0.4 milliGRAM(s) Oral at bedtime  tiotropium 18 MICROgram(s) Capsule 1 Capsule(s) Inhalation daily      08-07    140  |  105  |  22  ----------------------------<  185<H>  4.1   |  24  |  1.24    Ca    9.4      07 Aug 2021 07:25    T(C): 36.7 (08-07-21 @ 04:29), Max: 37 (08-06-21 @ 11:50)  HR: 73 (08-07-21 @ 06:11) (70 - 140)  BP: 116/70 (08-07-21 @ 04:29) (97/64 - 147/65)  RR: 18 (08-07-21 @ 04:29) (15 - 25)  SpO2: 99% (08-07-21 @ 06:11) (92% - 100%)  Wt(kg): --    I&O's Summary    06 Aug 2021 07:01  -  07 Aug 2021 07:00  --------------------------------------------------------  IN: 240 mL / OUT: 5120 mL / NET: -4880 mL    07 Aug 2021 07:01  -  07 Aug 2021 09:47  --------------------------------------------------------  IN: 0 mL / OUT: 200 mL / NET: -200 mL    Appearance: well appearing AA male in no acute distress, pleasant and in good spirits  HEENT:   Normal oral mucosa, PERRL, EOMI	  Lymphatic: No lymphadenopathy , no edema  Cardiovascular: Normal S1 S2, No JVD, No murmurs , Peripheral pulses palpable 2+ bilaterally  Respiratory: Lungs clear to auscultation but with poor air entry BL, normal effort 	  Gastrointestinal:  Soft, Non-tender, + BS	  Skin: No rashes, No ecchymoses, No cyanosis, cool to touch, 1+ posterior tibialis pulses BL, radial arteries are 2+BL  Musculoskeletal: Normal range of motion, normal strength  Psychiatry:  Mood & affect appropriate    TELEMETRY: Episodes of AT and AFib, self-terminating.	    ECG: NSR 	  Echo: bedside limited echo with no pericardial effusion (ED staff)  Echo:   1. Mitral valve not well visualized, probably normal. Minimal mitral regurgitation.  2. Calcified trileaflet aortic valve with normal opening. No aortic valve regurgitation seen.  3. Mild segmental left ventricular systolic dysfunction. The ejection fraction varies with the R-R interval.  The  apical inferior wall, the apical septum, the apical lateral wall, the basal inferior wall, the mid anterior wall, and  the mid inferior wall are hypokinetic.  Septal motion consistent with cardiac surgery.  4. Mild diastolic dysfunction (Stage I).  5. Normal right ventricular size and function.  *** Compared with echocardiogram of 6/14/2018, there has been an interval decline in left ventricular systolic  function.    Stress:  * The left ventricle was normal in size.  * There are medium-sized, moderate defects in the mid to distal anterolateral and distal anterior walls that are  mostly reversible suggestive of ischemia with partial scarring.  * There are medium-sized, mild to moderate defects in the anteroseptal and apical walls that are partially  reversible suggestive of infarct with moderate ajay-infarct ischemia.  * There are large, mild defects in the inferoseptal, mid to distal inferior, and distal inferolateral walls that  are mostly fixed suggestive of infarct with mild ajay-infarct ischemia.  * Post-stress gated wall motion analysis was performed (LVEF = 54 %;LVEDV = 83 ml.) revealing hypokinesis of the  septal, distal anterior, and apical walls and reduced systolic thickening of the mid to distal inferior and distal inferolateral walls.    Cath:  Mid LAD PCI x 1.  Per Erik Zimmer on 8/7 after groin check, and Plavix. No aspirin.    ASSESSMENT/PLAN: 	81y Male with COPD, CAD and PAD, here with profuse sweating and gait instability.  New diagnosis of PAT and Paroxysmal Atrial Fibrillation.    Has asymptomatic Paroxysmal AT and AFib.  Can use Metoprolol or Diltiazem for rate control. Would avoid Amiodarone given O2-dependent COPD.  Despite his lung issues, he is a fair candidate for EP study and ablation, should we eventualyl prove a symptom-rhythm correlation.  Outpatient rhythm monitoring to see if he is symptomatic during exertion.    Echo with reduced LV EF (45-50%) compared to normal EF from 2018, 2019 and 2020 office echos.  Abnormal stress, and he underwent PCI to LAD, (Dr Dipo), recommended single antiplatlet (Clopidogrel) + Apixaban.    Discharge on Apixaban 5mg BID and Clopidogrel 75mg daily.  Metoprolol for AT/AF rate control.  To keep his scheduled followup w/ Dr Rod on 9/17.    Sergio Payton M.D.  Cardiac Electrophysiology    office [phone]  pager [phone]
Date of Service  : 08-06-21 @ 13:34    INTERVAL HPI/OVERNIGHT EVENTS: I feel fine.   Vital Signs Last 24 Hrs  T(C): 37 (06 Aug 2021 13:04), Max: 37 (06 Aug 2021 11:50)  T(F): 98.6 (06 Aug 2021 13:04), Max: 98.6 (06 Aug 2021 11:50)  HR: 88 (06 Aug 2021 13:04) (76 - 92)  BP: 124/58 (06 Aug 2021 13:04) (100/65 - 124/58)  BP(mean): --  RR: 20 (06 Aug 2021 13:04) (18 - 20)  SpO2: 96% (06 Aug 2021 13:04) (93% - 99%)  I&O's Summary    05 Aug 2021 07:01  -  06 Aug 2021 07:00  --------------------------------------------------------  IN: 420 mL / OUT: 1550 mL / NET: -1130 mL    06 Aug 2021 07:01  -  06 Aug 2021 13:34  --------------------------------------------------------  IN: 240 mL / OUT: 400 mL / NET: -160 mL      MEDICATIONS  (STANDING):  atorvastatin 20 milliGRAM(s) Oral at bedtime  budesonide 160 MICROgram(s)/formoterol 4.5 MICROgram(s) Inhaler 2 Puff(s) Inhalation two times a day  clopidogrel Tablet 75 milliGRAM(s) Oral daily  dextrose 40% Gel 15 Gram(s) Oral once  dextrose 5%. 1000 milliLiter(s) (50 mL/Hr) IV Continuous <Continuous>  dextrose 5%. 1000 milliLiter(s) (100 mL/Hr) IV Continuous <Continuous>  dextrose 50% Injectable 25 Gram(s) IV Push once  dextrose 50% Injectable 12.5 Gram(s) IV Push once  dextrose 50% Injectable 25 Gram(s) IV Push once  glucagon  Injectable 1 milliGRAM(s) IntraMuscular once  insulin lispro (ADMELOG) corrective regimen sliding scale   SubCutaneous three times a day before meals  insulin lispro (ADMELOG) corrective regimen sliding scale   SubCutaneous at bedtime  losartan 25 milliGRAM(s) Oral daily  metoprolol tartrate 50 milliGRAM(s) Oral two times a day  predniSONE   Tablet 4 milliGRAM(s) Oral daily  tamsulosin 0.4 milliGRAM(s) Oral at bedtime  tiotropium 18 MICROgram(s) Capsule 1 Capsule(s) Inhalation daily    MEDICATIONS  (PRN):  levalbuterol Inhalation 0.63 milliGRAM(s) Inhalation every 8 hours PRN wheezing / shortness of breath    LABS:    08-06    140  |  105  |  23  ----------------------------<  275<H>  4.5   |  25  |  1.24    Ca    10.1      06 Aug 2021 11:18  Mg     2.0     08-05          CAPILLARY BLOOD GLUCOSE      POCT Blood Glucose.: 198 mg/dL (06 Aug 2021 08:38)  POCT Blood Glucose.: 236 mg/dL (05 Aug 2021 21:33)  POCT Blood Glucose.: 208 mg/dL (05 Aug 2021 17:17)          REVIEW OF SYSTEMS:  CONSTITUTIONAL: No fever, weight loss, or fatigue  EYES: No eye pain, visual disturbances, or discharge  ENMT:  No difficulty hearing, tinnitus, vertigo; No sinus or throat pain  NECK: No pain or stiffness  RESPIRATORY: No cough, wheezing, chills or hemoptysis; No shortness of breath  CARDIOVASCULAR: No chest pain, palpitations, dizziness, or leg swelling  GASTROINTESTINAL: No abdominal or epigastric pain. No nausea, vomiting, or hematemesis; No diarrhea or constipation. No melena or hematochezia.  GENITOURINARY: No dysuria, frequency, hematuria, or incontinence  NEUROLOGICAL: No headaches, memory loss, loss of strength, numbness, or tremors      Consultant(s) Notes Reviewed:  [x ] YES  [ ] NO    PHYSICAL EXAM:  GENERAL: NAD, NC Oxygen   HEAD:  Atraumatic, Normocephalic  EYES: EOMI, PERRLA, conjunctiva and sclera clear  ENMT: No tonsillar erythema, exudates, or enlargement; Moist mucous membranes, Good dentition, No lesions  NECK: Supple, No JVD, Normal thyroid  NERVOUS SYSTEM:  Alert & Oriented X3, No focal deficit   CHEST/LUNG: Good air entry bilateral with no  rales, rhonchi, wheezing, or rubs  HEART: Regular rate and rhythm; No murmurs, rubs, or gallops  ABDOMEN: Soft, Nontender, Nondistended; Bowel sounds present  EXTREMITIES:  2+ Peripheral Pulses, No clubbing, cyanosis, or edema  SKIN: No rashes or lesions    Care Discussed with Consultants/Other Providers [ x] YES  [ ] NO
Date of Service: 08-05-21 @ 14:13    Patient is a 81y old  Male who presents with a chief complaint of Fast Heart Rate (05 Aug 2021 11:08)    Any change in ROS:   Feeling well today, seen with wife at bedside   O2 sats 98% on room air  Denies CP, SOB    MEDICATIONS  (STANDING):  apixaban 5 milliGRAM(s) Oral two times a day  atorvastatin 20 milliGRAM(s) Oral at bedtime  budesonide 160 MICROgram(s)/formoterol 4.5 MICROgram(s) Inhaler 2 Puff(s) Inhalation two times a day  clopidogrel Tablet 75 milliGRAM(s) Oral daily  dextrose 40% Gel 15 Gram(s) Oral once  dextrose 5%. 1000 milliLiter(s) (50 mL/Hr) IV Continuous <Continuous>  dextrose 5%. 1000 milliLiter(s) (100 mL/Hr) IV Continuous <Continuous>  dextrose 50% Injectable 25 Gram(s) IV Push once  dextrose 50% Injectable 12.5 Gram(s) IV Push once  dextrose 50% Injectable 25 Gram(s) IV Push once  glucagon  Injectable 1 milliGRAM(s) IntraMuscular once  insulin lispro (ADMELOG) corrective regimen sliding scale   SubCutaneous three times a day before meals  insulin lispro (ADMELOG) corrective regimen sliding scale   SubCutaneous at bedtime  losartan 25 milliGRAM(s) Oral daily  metoprolol tartrate 50 milliGRAM(s) Oral two times a day  predniSONE   Tablet 4 milliGRAM(s) Oral daily  tamsulosin 0.4 milliGRAM(s) Oral at bedtime  tiotropium 18 MICROgram(s) Capsule 1 Capsule(s) Inhalation daily    MEDICATIONS  (PRN):  levalbuterol Inhalation 0.63 milliGRAM(s) Inhalation every 8 hours PRN wheezing / shortness of breath    Vital Signs Last 24 Hrs  T(C): 36.4 (05 Aug 2021 11:48), Max: 36.7 (04 Aug 2021 20:18)  T(F): 97.6 (05 Aug 2021 11:48), Max: 98 (04 Aug 2021 20:18)  HR: 91 (05 Aug 2021 11:48) (78 - 98)  BP: 108/68 (05 Aug 2021 11:48) (105/65 - 117/83)  BP(mean): --  RR: 18 (05 Aug 2021 11:48) (18 - 18)  SpO2: 98% (05 Aug 2021 11:48) (97% - 100%)    I&O's Summary    04 Aug 2021 07:01  -  05 Aug 2021 07:00  --------------------------------------------------------  IN: 480 mL / OUT: 2000 mL / NET: -1520 mL    05 Aug 2021 07:01  -  05 Aug 2021 14:13  --------------------------------------------------------  IN: 0 mL / OUT: 300 mL / NET: -300 mL    Physical Exam:   GENERAL: NAD  HEENT: VIOLETA  ENMT: No tonsillar erythema, exudates, or enlargement  NECK: Supple, No JVD  CHEST/LUNG: Clear to auscultation b/l  CVS: Regular rate and rhythm; tachy  GI: : Soft, Nontender, Nondistended  NERVOUS SYSTEM:  Alert & Oriented X3  EXTREMITIES:  2+ Peripheral Pulses, No clubbing, cyanosis, or edema  LYMPH: No lymphadenopathy noted  SKIN: No rashes or lesions  PSYCH: Appropriate    Labs:  30                    12.3   9.56  )-----------( 340      ( 02 Aug 2021 15:26 )             42.3     08-05    138  |  102  |  28<H>  ----------------------------<  165<H>  3.8   |  24  |  1.37<H>  08-04    138  |  102  |  20  ----------------------------<  162<H>  3.9   |  25  |  1.18  08-03    138  |  100  |  24<H>  ----------------------------<  227<H>  4.1   |  27  |  1.20  08-03    143  |  106  |  19  ----------------------------<  144<H>  4.2   |  27  |  1.24  08-02    141  |  103  |  20  ----------------------------<  180<H>  4.7   |  26  |  1.33<H>    Ca    9.5      05 Aug 2021 07:37  Ca    10.0      04 Aug 2021 05:55  Ca    10.1      03 Aug 2021 22:02  Phos  3.3     08-04  Phos  3.2     08-03  Mg     2.0     08-05  Mg     1.9     08-04  Mg     1.8     08-03    TPro  6.9  /  Alb  4.1  /  TBili  0.2  /  DBili  x   /  AST  9<L>  /  ALT  6<L>  /  AlkPhos  98  08-02    CAPILLARY BLOOD GLUCOSE  POCT Blood Glucose.: 241 mg/dL (05 Aug 2021 12:46)  POCT Blood Glucose.: 254 mg/dL (04 Aug 2021 21:24)  POCT Blood Glucose.: 183 mg/dL (04 Aug 2021 17:12)    Studies  Chest X-RAY< from: Xray Chest 2 Views PA/Lat (08.02.21 @ 15:17) >    COMPARISON: 1/17/2020    Radiographic examination shows the heart to be small in size. The lungs are hyperinflated but clear. Sternal wires are again noted.    There is no evidence of focal infiltrate, pneumothorax or pleural effusion.    IMPRESSION: COPD.    Thank you for this referral.    < end of copied text >    < from: TTE with Doppler (w/Cont) (08.03.21 @ 06:53) >  ------------------------------------------------------------------------  Dimensions:    Normal Values:  LA:     3.4    2.0 - 4.0 cm  Ao:     3.6    2.0 - 3.8 cm  SEPTUM: 0.7    0.6 - 1.2 cm  PWT:    0.6  0.6 - 1.1 cm  LVIDd:  3.9    3.0 - 5.6 cm  LVIDs:  3.3    1.8 - 4.0 cm  Derived variables:  LVMI: 34 g/m2  RWT: 0.35  Fractional short: 14 %  EF (Visual Estimate): 45-50 %  Doppler Peak Velocity (m/sec): AoV=0.9  ------------------------------------------------------------------------  Observations:  Mitral Valve: Mitral valve not well visualized, probably  normal. Minimal mitral regurgitation.  Aortic Valve/Aorta: Calcified trileaflet aortic valve with  normal opening. Peak transaortic valve gradient equals 4 mm  Hg, estimated aortic valve area equals 3.1 sqcm. No aortic  valve regurgitation seen. Peak left ventricular outflow  tract gradient equals 3 mm Hg.  Aortic Root: 3.6 cm.  Left Atrium: Normal left atrium.  Left Ventricle: Mild segmental left ventricular systolic  dysfunction. The ejection fraction varies with the R-R  interval.  The apical inferior wall, the apical septum, the  apical lateral wall, the basal inferior wall, the mid  anterior wall, and the mid inferior wall are hypokinetic.  Septal motion consistent with cardiac surgery. Normal left  ventricular internal dimensions and wall thicknesses. Mild  diastolic dysfunction (Stage I).  Right Heart: Normal right atrium. Normal right ventricular  size and function. Normal tricuspid valve. Minimal  tricuspid regurgitation. Normal pulmonic valve. No pulmonic  regurgitation.  Pericardium/Pleura: Normal pericardium with no pericardial  effusion.  Hemodynamic: Color Doppler demonstrates no evidence of a  patent foramen ovale.  ------------------------------------------------------------------------  Conclusions:  1. Mitral valve not well visualized, probably normal.  Minimal mitral regurgitation.  2. Calcified trileaflet aortic valve with normal opening.  No aortic valve regurgitation seen.  3. Mild segmental left ventricular systolic dysfunction.  The ejection fraction varies with the R-R interval.  The  apical inferior wall, the apical septum, the apical lateral  wall, the basal inferior wall, the mid anterior wall, and  the mid inferior wall are hypokinetic.  Septal motion  consistent with cardiac surgery.  4. Mild diastolic dysfunction (Stage I).  5. Normal right ventricular size and function.  *** Compared with echocardiogram of 6/14/2018, there has  beenan interval decline in left ventricular systolic  function.    < end of copied text >                  
Date of Service: 08-06-21 @ 11:40    Patient is a 81y old  Male who presents with a chief complaint of Fast Heart Rate (05 Aug 2021 16:17)      Any change in ROS: Seems to be doing good:  no SOB :no cough : no phlegm :       MEDICATIONS  (STANDING):  atorvastatin 20 milliGRAM(s) Oral at bedtime  budesonide 160 MICROgram(s)/formoterol 4.5 MICROgram(s) Inhaler 2 Puff(s) Inhalation two times a day  clopidogrel Tablet 75 milliGRAM(s) Oral daily  dextrose 40% Gel 15 Gram(s) Oral once  dextrose 5%. 1000 milliLiter(s) (50 mL/Hr) IV Continuous <Continuous>  dextrose 5%. 1000 milliLiter(s) (100 mL/Hr) IV Continuous <Continuous>  dextrose 50% Injectable 25 Gram(s) IV Push once  dextrose 50% Injectable 12.5 Gram(s) IV Push once  dextrose 50% Injectable 25 Gram(s) IV Push once  glucagon  Injectable 1 milliGRAM(s) IntraMuscular once  insulin lispro (ADMELOG) corrective regimen sliding scale   SubCutaneous three times a day before meals  insulin lispro (ADMELOG) corrective regimen sliding scale   SubCutaneous at bedtime  losartan 25 milliGRAM(s) Oral daily  metoprolol tartrate 50 milliGRAM(s) Oral two times a day  predniSONE   Tablet 4 milliGRAM(s) Oral daily  tamsulosin 0.4 milliGRAM(s) Oral at bedtime  tiotropium 18 MICROgram(s) Capsule 1 Capsule(s) Inhalation daily    MEDICATIONS  (PRN):  levalbuterol Inhalation 0.63 milliGRAM(s) Inhalation every 8 hours PRN wheezing / shortness of breath    Vital Signs Last 24 Hrs  T(C): 36.7 (06 Aug 2021 04:43), Max: 36.9 (05 Aug 2021 20:00)  T(F): 98 (06 Aug 2021 04:43), Max: 98.4 (05 Aug 2021 20:00)  HR: 76 (06 Aug 2021 08:55) (76 - 92)  BP: 122/74 (06 Aug 2021 05:25) (100/65 - 122/74)  BP(mean): --  RR: 18 (06 Aug 2021 04:43) (18 - 18)  SpO2: 93% (06 Aug 2021 08:55) (93% - 99%)    I&O's Summary    05 Aug 2021 07:01  -  06 Aug 2021 07:00  --------------------------------------------------------  IN: 420 mL / OUT: 1550 mL / NET: -1130 mL    06 Aug 2021 07:01  -  06 Aug 2021 11:40  --------------------------------------------------------  IN: 240 mL / OUT: 400 mL / NET: -160 mL          Physical Exam:   GENERAL: NAD, well-groomed, well-developed  HEENT: VIOLETA/   Atraumatic, Normocephalic  ENMT: No tonsillar erythema, exudates, or enlargement; Moist mucous membranes, Good dentition, No lesions  NECK: Supple, No JVD, Normal thyroid  CHEST/LUNG: Clear to auscultaion  CVS: Regular rate and rhythm; No murmurs, rubs, or gallops  GI: : Soft, Nontender, Nondistended; Bowel sounds present  NERVOUS SYSTEM:  Alert & Oriented X3  EXTREMITIES:  2+ Peripheral Pulses, No clubbing, cyanosis, or edema  LYMPH: No lymphadenopathy noted  SKIN: No rashes or lesions  ENDOCRINOLOGY: No Thyromegaly  PSYCH: Appropriate    Labs:  30                            12.3   9.56  )-----------( 340      ( 02 Aug 2021 15:26 )             42.3     08-05    138  |  102  |  28<H>  ----------------------------<  165<H>  3.8   |  24  |  1.37<H>  08-04    138  |  102  |  20  ----------------------------<  162<H>  3.9   |  25  |  1.18  08-03    138  |  100  |  24<H>  ----------------------------<  227<H>  4.1   |  27  |  1.20  08-03    143  |  106  |  19  ----------------------------<  144<H>  4.2   |  27  |  1.24  08-02    141  |  103  |  20  ----------------------------<  180<H>  4.7   |  26  |  1.33<H>    Ca    9.5      05 Aug 2021 07:37  Mg     2.0     08-05    TPro  6.9  /  Alb  4.1  /  TBili  0.2  /  DBili  x   /  AST  9<L>  /  ALT  6<L>  /  AlkPhos  98  08-02    CAPILLARY BLOOD GLUCOSE      POCT Blood Glucose.: 198 mg/dL (06 Aug 2021 08:38)  POCT Blood Glucose.: 236 mg/dL (05 Aug 2021 21:33)  POCT Blood Glucose.: 208 mg/dL (05 Aug 2021 17:17)  POCT Blood Glucose.: 241 mg/dL (05 Aug 2021 12:46)            rad< from: Xray Chest 2 Views PA/Lat (08.02.21 @ 15:17) >  EXAM:  XR CHEST PA LAT 2V                            PROCEDURE DATE:  08/02/2021            INTERPRETATION:  Chest two view    HISTORY: Chest pain    COMPARISON: 1/17/2020    Radiographic examination shows the heart to be small in size. The lungs are hyperinflated but clear. Sternal wires are again noted.    There is no evidence of focal infiltrate, pneumothorax or pleural effusion.    IMPRESSION: COPD.    Thank you for this referral.      --- End of Report ---                KAREN SHARMA MD; Attending Interventional Radiologist  This document has been electronically signed. Aug  2 2021  3:39PM    < end of copied text >  < from: Xray Chest 1 View- PORTABLE-Urgent (Xray Chest 1 View- PORTABLE-Urgent .) (05.17.21 @ 15:00) >    EXAM:  XR CHEST PORTABLE URGENT 1V                            PROCEDURE DATE:  05/17/2021          INTERPRETATION:  CLINICAL STATEMENT: Postthoracocentesis    TECHNIQUE: AP view of the chest.    COMPARISON: 5/17/2021 at 10:46 AM    FINDINGS/  IMPRESSION:  Sternotomy wires again noted. No pneumothorax. No consolidation. Right CP angle excluded. No significant pleural effusion    Heart size within normal limits.              ALEKS HERBERT MD; Attending Radiologist  This document has been electronically signed. May 17 2021  3:07PM    < end of copied text >        RECENT CULTURES:        RESPIRATORY CULTURES:          Studies  Chest X-RAY  CT SCAN Chest   Venous Dopplers: LE:   CT Abdomen  Others              
Date of Service: 21 @ 15:07    Patient is a 81y old  Male who presents with a chief complaint of Fast Heart Rate (04 Aug 2021 11:00)    Any change in ROS:   O2 sats 98% on room air  Bipap qhs  Denies CP, SOB    MEDICATIONS  (STANDING):  apixaban 5 milliGRAM(s) Oral two times a day  atorvastatin 20 milliGRAM(s) Oral at bedtime  budesonide 160 MICROgram(s)/formoterol 4.5 MICROgram(s) Inhaler 2 Puff(s) Inhalation two times a day  clopidogrel Tablet 75 milliGRAM(s) Oral daily  dextrose 40% Gel 15 Gram(s) Oral once  dextrose 5%. 1000 milliLiter(s) (50 mL/Hr) IV Continuous <Continuous>  dextrose 5%. 1000 milliLiter(s) (100 mL/Hr) IV Continuous <Continuous>  dextrose 50% Injectable 25 Gram(s) IV Push once  dextrose 50% Injectable 12.5 Gram(s) IV Push once  dextrose 50% Injectable 25 Gram(s) IV Push once  glucagon  Injectable 1 milliGRAM(s) IntraMuscular once  insulin lispro (ADMELOG) corrective regimen sliding scale   SubCutaneous three times a day before meals  insulin lispro (ADMELOG) corrective regimen sliding scale   SubCutaneous at bedtime  losartan 25 milliGRAM(s) Oral daily  metoprolol tartrate 50 milliGRAM(s) Oral two times a day  predniSONE   Tablet 4 milliGRAM(s) Oral daily  tamsulosin 0.4 milliGRAM(s) Oral at bedtime  tiotropium 18 MICROgram(s) Capsule 1 Capsule(s) Inhalation daily    MEDICATIONS  (PRN):  levalbuterol Inhalation 0.63 milliGRAM(s) Inhalation every 8 hours PRN wheezing / shortness of breath    Vital Signs Last 24 Hrs  T(C): 36.4 (04 Aug 2021 11:34), Max: 36.7 (03 Aug 2021 17:16)  T(F): 97.5 (04 Aug 2021 11:34), Max: 98.1 (04 Aug 2021 04:26)  HR: 92 (04 Aug 2021 13:45) (66 - 104)  BP: 113/74 (04 Aug 2021 13:45) (106/67 - 134/80)  BP(mean): --  RR: 18 (04 Aug 2021 13:45) (18 - 23)  SpO2: 97% (04 Aug 2021 13:45) (96% - 100%)    I&O's Summary    03 Aug 2021 07:01  -  04 Aug 2021 07:00  --------------------------------------------------------  IN: 720 mL / OUT: 1100 mL / NET: -380 mL    04 Aug 2021 07:01  -  04 Aug 2021 15:07  --------------------------------------------------------  IN: 480 mL / OUT: 700 mL / NET: -220 mL    Physical Exam:   GENERAL: NAD  HEENT: VIOLETA  ENMT: No tonsillar erythema, exudates, or enlargement  NECK: Supple, No JVD  CHEST/LUNG: Clear to auscultation b/l  CVS: Regular rate and rhythm; tachy  GI: : Soft, Nontender, Nondistended  NERVOUS SYSTEM:  Alert & Oriented X3  EXTREMITIES:  2+ Peripheral Pulses, No clubbing, cyanosis, or edema  LYMPH: No lymphadenopathy noted  SKIN: No rashes or lesions  PSYCH: Appropriate    Labs:  ABG - ( 03 Aug 2021 03:16 )  pH, Arterial: 7.40  pH, Blood: x     /  pCO2: 46    /  pO2: 106   / HCO3: 28    / Base Excess: 3.1   /  SaO2: 98        30                      12.3   9.56  )-----------( 340      ( 02 Aug 2021 15:26 )             42.3         138  |  102  |  20  ----------------------------<  162<H>  3.9   |  25  |  1.18  08    138  |  100  |  24<H>  ----------------------------<  227<H>  4.1   |  27  |  1.20  0803    143  |  106  |  19  ----------------------------<  144<H>  4.2   |  27  |  1.24      141  |  103  |  20  ----------------------------<  180<H>  4.7   |  26  |  1.33<H>    Ca    10.0      04 Aug 2021 05:55  Ca    10.1      03 Aug 2021 22:02  Ca    10.3      03 Aug 2021 02:25  Ca    10.6<H>      02 Aug 2021 15:26  Phos  3.3     -04  Phos  3.2     08-03  Mg     1.9     08-04  Mg     1.8     08-03    TPro  6.9  /  Alb  4.1  /  TBili  0.2  /  DBili  x   /  AST  9<L>  /  ALT  6<L>  /  AlkPhos  98      CAPILLARY BLOOD GLUCOSE  POCT Blood Glucose.: 286 mg/dL (04 Aug 2021 12:39)  POCT Blood Glucose.: 173 mg/dL (04 Aug 2021 08:50)  POCT Blood Glucose.: 238 mg/dL (03 Aug 2021 21:15)  POCT Blood Glucose.: 182 mg/dL (03 Aug 2021 17:20)  POCT Blood Glucose.: 344 mg/dL (03 Aug 2021 17:15)  POCT Blood Glucose.: 141 mg/dL (03 Aug 2021 16:13)    LIVER FUNCTIONS - ( 02 Aug 2021 15:26 )  Alb: 4.1 g/dL / Pro: 6.9 g/dL / ALK PHOS: 98 U/L / ALT: 6 U/L / AST: 9 U/L / GGT: x           Urinalysis Basic - ( 02 Aug 2021 16:26 )    Color: Light Yellow / Appearance: Clear / S.032 / pH: x  Gluc: x / Ketone: Small  / Bili: Negative / Urobili: Negative   Blood: x / Protein: Trace / Nitrite: Negative   Leuk Esterase: Negative / RBC: 1 /hpf / WBC 1 /HPF   Sq Epi: x / Non Sq Epi: 1 /hpf / Bacteria: Negative      Studies  Chest X-RAY < from: Xray Chest 2 Views PA/Lat (21 @ 15:17) >    Radiographic examination shows the heart to be small in size. The lungs are hyperinflated but clear. Sternal wires are again noted.    There is no evidence of focal infiltrate, pneumothorax or pleural effusion.    IMPRESSION: COPD.    Thank you for this referral.      < end of copied text >      < from: TTE with Doppler (w/Cont) (21 @ 06:53) >  ------------------------------------------------------------------------  Dimensions:    Normal Values:  LA:     3.4    2.0 - 4.0 cm  Ao:     3.6    2.0 - 3.8 cm  SEPTUM: 0.7    0.6 - 1.2 cm  PWT:    0.6  0.6 - 1.1 cm  LVIDd:  3.9    3.0 - 5.6 cm  LVIDs:  3.3    1.8 - 4.0 cm  Derived variables:  LVMI: 34 g/m2  RWT: 0.35  Fractional short: 14 %  EF (Visual Estimate): 45-50 %  Doppler Peak Velocity (m/sec): AoV=0.9  ------------------------------------------------------------------------  Observations:  Mitral Valve: Mitral valve not well visualized, probably  normal. Minimal mitral regurgitation.  Aortic Valve/Aorta: Calcified trileaflet aortic valve with  normal opening. Peak transaortic valve gradient equals 4 mm  Hg, estimated aortic valve area equals 3.1 sqcm. No aortic  valve regurgitation seen. Peak left ventricular outflow  tract gradient equals 3 mm Hg.  Aortic Root: 3.6 cm.  Left Atrium: Normal left atrium.  Left Ventricle: Mild segmental left ventricular systolic  dysfunction. The ejection fraction varies with the R-R  interval.  The apical inferior wall, the apical septum, the  apical lateral wall, the basal inferior wall, the mid  anterior wall, and the mid inferior wall are hypokinetic.  Septal motion consistent with cardiac surgery. Normal left  ventricular internal dimensions and wall thicknesses. Mild  diastolic dysfunction (Stage I).  Right Heart: Normal right atrium. Normal right ventricular  size and function. Normal tricuspid valve. Minimal  tricuspid regurgitation. Normal pulmonic valve. No pulmonic  regurgitation.  Pericardium/Pleura: Normal pericardium with no pericardial  effusion.  Hemodynamic: Color Doppler demonstrates no evidence of a  patent foramen ovale.  ------------------------------------------------------------------------  Conclusions:  1. Mitral valve not well visualized, probably normal.  Minimal mitral regurgitation.  2. Calcified trileaflet aortic valve with normal opening.  No aortic valve regurgitation seen.  3. Mild segmental left ventricular systolic dysfunction.  The ejection fraction varies with the R-R interval.  The  apical inferior wall, the apical septum, the apical lateral  wall, the basal inferior wall, the mid anterior wall, and  the mid inferior wall are hypokinetic.  Septal motion  consistent with cardiac surgery.  4. Mild diastolic dysfunction (Stage I).  5. Normal right ventricular size and function.  *** Compared with echocardiogram of 2018, there has  beenan interval decline in left ventricular systolic  function.    < end of copied text >

## 2021-08-07 NOTE — CHART NOTE - NSCHARTNOTEFT_GEN_A_CORE
Cardiac Cath Site Assessment Note     HPI:81y Male with COPD, CAD and PAD, here with profuse sweating and gait instability. Abnormal ST s/p LHC on 8/6 with ZACHARIAH to mLAD       ECG: w/o significant ST or T wave changes   tele: SR 80-90s with PATs      ( right) groin w/o bleeding or hematoma.  soft, non tender.  Pulses in the (right/left) lower extremity are (palpable/audible by doppler/absent).   Denies chest pain, denies groin/leg/foot: pain, numbness or tingling       cont DAPT Plavix and Eliquis only ( ok resume Eliquis today 8/7)   no Aspirin as per Dr Diop   all other care as per primary team Cardiac Cath Site Assessment Note     HPI:81y Male with COPD, CAD and PAD, here with profuse sweating and gait instability. Abnormal ST s/p LHC on 8/6 with ZACHARIAH to mLAD       ECG: w/o significant ST or T wave changes   tele: SR 80-90s with PATs      ( right) groin w/o bleeding or hematoma.  soft, non tender.  Pulses in the (right) lower extremity are (palpable/audible by doppler/absent).   Denies chest pain, denies groin/leg/foot: pain, numbness or tingling       cont DAPT Plavix and Eliquis only ( ok resume Eliquis today 8/7)   no Aspirin as per Dr Diop   all other care as per primary team Cardiac Cath Site Assessment Note     HPI:81y Male with COPD, CAD and PAD, here with profuse sweating and gait instability. Abnormal ST s/p LHC on 8/6 with ZACHARIAH to mLAD       ECG: w/o significant ST or T wave changes   tele: SR 80-90s with PATs      ( right) groin w/o bleeding or hematoma.  soft, non tender.  Pulses in the (right) lower extremity are (palpable).   Denies chest pain, denies groin/leg/foot: pain, numbness or tingling       cont DAPT Plavix and Eliquis only ( ok resume Eliquis today 8/7)   no Aspirin as per Dr Diop   all other care as per primary team

## 2021-08-07 NOTE — DISCHARGE NOTE NURSING/CASE MANAGEMENT/SOCIAL WORK - PATIENT PORTAL LINK FT
You can access the FollowMyHealth Patient Portal offered by [facility] by registering at the following website: [email or web address]. By joining [facility]’s FollowMyHealth portal, you will also be able to view your health information using other applications (apps) compatible with our system.

## 2021-08-09 RX ORDER — METOPROLOL SUCCINATE 25 MG/1
25 TABLET, EXTENDED RELEASE ORAL
Refills: 0 | Status: DISCONTINUED | COMMUNITY
End: 2021-08-09

## 2021-08-09 RX ORDER — METOPROLOL TARTRATE 50 MG/1
50 TABLET, FILM COATED ORAL
Refills: 2 | Status: ACTIVE | COMMUNITY

## 2021-08-09 NOTE — ED ADULT NURSE NOTE - NS PRO AD PATIENT TYPE
Will get hepatitis testing every 6 months.  Next due in December    No further follow up needed with Dr Gonzalez at this time.  
Health Care Proxy (HCP)

## 2021-08-30 NOTE — DIETITIAN INITIAL EVALUATION ADULT. - PROBLEM/PLAN-7
Eat healthy foods you enjoy. Apixaban/Eliquis DOES NOT have a special diet. Limit your alcohol intake. DISPLAY PLAN FREE TEXT

## 2021-09-03 ENCOUNTER — INPATIENT (INPATIENT)
Facility: HOSPITAL | Age: 81
LOS: 2 days | Discharge: HOME | DRG: 190 | End: 2021-09-06
Attending: STUDENT IN AN ORGANIZED HEALTH CARE EDUCATION/TRAINING PROGRAM | Admitting: STUDENT IN AN ORGANIZED HEALTH CARE EDUCATION/TRAINING PROGRAM
Payer: COMMERCIAL

## 2021-09-03 VITALS — HEIGHT: 71 IN

## 2021-09-03 DIAGNOSIS — T14.8XXA OTHER INJURY OF UNSPECIFIED BODY REGION, INITIAL ENCOUNTER: Chronic | ICD-10-CM

## 2021-09-03 DIAGNOSIS — Z95.5 PRESENCE OF CORONARY ANGIOPLASTY IMPLANT AND GRAFT: Chronic | ICD-10-CM

## 2021-09-03 DIAGNOSIS — J44.1 CHRONIC OBSTRUCTIVE PULMONARY DISEASE WITH (ACUTE) EXACERBATION: ICD-10-CM

## 2021-09-03 LAB
ALBUMIN SERPL ELPH-MCNC: 3.3 G/DL — SIGNIFICANT CHANGE UP (ref 3.3–5)
ALP SERPL-CCNC: 86 U/L — SIGNIFICANT CHANGE UP (ref 40–120)
ALT FLD-CCNC: 21 U/L — SIGNIFICANT CHANGE UP (ref 12–78)
ANION GAP SERPL CALC-SCNC: 8 MMOL/L — SIGNIFICANT CHANGE UP (ref 5–17)
AST SERPL-CCNC: 19 U/L — SIGNIFICANT CHANGE UP (ref 15–37)
BASE EXCESS BLDA CALC-SCNC: 5.1 MMOL/L — HIGH (ref -2–3)
BASOPHILS # BLD AUTO: 0.03 K/UL — SIGNIFICANT CHANGE UP (ref 0–0.2)
BASOPHILS NFR BLD AUTO: 0.2 % — SIGNIFICANT CHANGE UP (ref 0–2)
BILIRUB SERPL-MCNC: 0.2 MG/DL — SIGNIFICANT CHANGE UP (ref 0.2–1.2)
BLOOD GAS COMMENTS ARTERIAL: SIGNIFICANT CHANGE UP
BLOOD GAS COMMENTS ARTERIAL: SIGNIFICANT CHANGE UP
BUN SERPL-MCNC: 22 MG/DL — SIGNIFICANT CHANGE UP (ref 7–23)
CALCIUM SERPL-MCNC: 9 MG/DL — SIGNIFICANT CHANGE UP (ref 8.5–10.1)
CHLORIDE SERPL-SCNC: 110 MMOL/L — HIGH (ref 96–108)
CO2 SERPL-SCNC: 23 MMOL/L — SIGNIFICANT CHANGE UP (ref 22–31)
CREAT SERPL-MCNC: 1.3 MG/DL — SIGNIFICANT CHANGE UP (ref 0.5–1.3)
EOSINOPHIL # BLD AUTO: 0.02 K/UL — SIGNIFICANT CHANGE UP (ref 0–0.5)
EOSINOPHIL NFR BLD AUTO: 0.1 % — SIGNIFICANT CHANGE UP (ref 0–6)
GLUCOSE SERPL-MCNC: 224 MG/DL — HIGH (ref 70–99)
HCO3 BLDA-SCNC: 32 MMOL/L — HIGH (ref 21–28)
HCT VFR BLD CALC: 43.8 % — SIGNIFICANT CHANGE UP (ref 39–50)
HGB BLD-MCNC: 13 G/DL — SIGNIFICANT CHANGE UP (ref 13–17)
IMM GRANULOCYTES NFR BLD AUTO: 0.8 % — SIGNIFICANT CHANGE UP (ref 0–1.5)
LACTATE SERPL-SCNC: 3.1 MMOL/L — HIGH (ref 0.7–2)
LYMPHOCYTES # BLD AUTO: 1.41 K/UL — SIGNIFICANT CHANGE UP (ref 1–3.3)
LYMPHOCYTES # BLD AUTO: 9 % — LOW (ref 13–44)
MCHC RBC-ENTMCNC: 26.6 PG — LOW (ref 27–34)
MCHC RBC-ENTMCNC: 29.7 GM/DL — LOW (ref 32–36)
MCV RBC AUTO: 89.8 FL — SIGNIFICANT CHANGE UP (ref 80–100)
MONOCYTES # BLD AUTO: 1.13 K/UL — HIGH (ref 0–0.9)
MONOCYTES NFR BLD AUTO: 7.2 % — SIGNIFICANT CHANGE UP (ref 2–14)
NEUTROPHILS # BLD AUTO: 12.97 K/UL — HIGH (ref 1.8–7.4)
NEUTROPHILS NFR BLD AUTO: 82.7 % — HIGH (ref 43–77)
NRBC # BLD: 0 /100 WBCS — SIGNIFICANT CHANGE UP (ref 0–0)
NT-PROBNP SERPL-SCNC: 152 PG/ML — SIGNIFICANT CHANGE UP (ref 0–450)
PCO2 BLDA: 64 MMHG — HIGH (ref 35–48)
PH BLDA: 7.3 — LOW (ref 7.35–7.45)
PLATELET # BLD AUTO: 218 K/UL — SIGNIFICANT CHANGE UP (ref 150–400)
PO2 BLDA: 523 MMHG — HIGH (ref 83–108)
POTASSIUM SERPL-MCNC: 4.7 MMOL/L — SIGNIFICANT CHANGE UP (ref 3.5–5.3)
POTASSIUM SERPL-SCNC: 4.7 MMOL/L — SIGNIFICANT CHANGE UP (ref 3.5–5.3)
PROT SERPL-MCNC: 7.2 G/DL — SIGNIFICANT CHANGE UP (ref 6–8.3)
RAPID RVP RESULT: SIGNIFICANT CHANGE UP
RBC # BLD: 4.88 M/UL — SIGNIFICANT CHANGE UP (ref 4.2–5.8)
RBC # FLD: 14.4 % — SIGNIFICANT CHANGE UP (ref 10.3–14.5)
SAO2 % BLDA: 100 % — HIGH (ref 94–98)
SARS-COV-2 RNA SPEC QL NAA+PROBE: SIGNIFICANT CHANGE UP
SODIUM SERPL-SCNC: 141 MMOL/L — SIGNIFICANT CHANGE UP (ref 135–145)
TROPONIN I SERPL-MCNC: 0.02 NG/ML — SIGNIFICANT CHANGE UP (ref 0.01–0.04)
WBC # BLD: 15.68 K/UL — HIGH (ref 3.8–10.5)
WBC # FLD AUTO: 15.68 K/UL — HIGH (ref 3.8–10.5)

## 2021-09-03 PROCEDURE — 71045 X-RAY EXAM CHEST 1 VIEW: CPT | Mod: 26

## 2021-09-03 PROCEDURE — 99291 CRITICAL CARE FIRST HOUR: CPT

## 2021-09-03 PROCEDURE — 93010 ELECTROCARDIOGRAM REPORT: CPT

## 2021-09-03 PROCEDURE — 99223 1ST HOSP IP/OBS HIGH 75: CPT | Mod: GC

## 2021-09-03 RX ORDER — IPRATROPIUM/ALBUTEROL SULFATE 18-103MCG
3 AEROSOL WITH ADAPTER (GRAM) INHALATION ONCE
Refills: 0 | Status: COMPLETED | OUTPATIENT
Start: 2021-09-03 | End: 2021-09-03

## 2021-09-03 RX ORDER — DEXTROSE 50 % IN WATER 50 %
12.5 SYRINGE (ML) INTRAVENOUS ONCE
Refills: 0 | Status: DISCONTINUED | OUTPATIENT
Start: 2021-09-03 | End: 2021-09-06

## 2021-09-03 RX ORDER — INSULIN LISPRO 100/ML
VIAL (ML) SUBCUTANEOUS
Refills: 0 | Status: DISCONTINUED | OUTPATIENT
Start: 2021-09-03 | End: 2021-09-06

## 2021-09-03 RX ORDER — DEXTROSE 50 % IN WATER 50 %
25 SYRINGE (ML) INTRAVENOUS ONCE
Refills: 0 | Status: DISCONTINUED | OUTPATIENT
Start: 2021-09-03 | End: 2021-09-06

## 2021-09-03 RX ORDER — LOSARTAN POTASSIUM 100 MG/1
25 TABLET, FILM COATED ORAL DAILY
Refills: 0 | Status: DISCONTINUED | OUTPATIENT
Start: 2021-09-03 | End: 2021-09-06

## 2021-09-03 RX ORDER — BUDESONIDE AND FORMOTEROL FUMARATE DIHYDRATE 160; 4.5 UG/1; UG/1
2 AEROSOL RESPIRATORY (INHALATION)
Refills: 0 | Status: DISCONTINUED | OUTPATIENT
Start: 2021-09-03 | End: 2021-09-06

## 2021-09-03 RX ORDER — AZITHROMYCIN 500 MG/1
500 TABLET, FILM COATED ORAL EVERY 24 HOURS
Refills: 0 | Status: DISCONTINUED | OUTPATIENT
Start: 2021-09-04 | End: 2021-09-06

## 2021-09-03 RX ORDER — ASPIRIN/CALCIUM CARB/MAGNESIUM 324 MG
81 TABLET ORAL DAILY
Refills: 0 | Status: DISCONTINUED | OUTPATIENT
Start: 2021-09-03 | End: 2021-09-06

## 2021-09-03 RX ORDER — INSULIN LISPRO 100/ML
VIAL (ML) SUBCUTANEOUS AT BEDTIME
Refills: 0 | Status: DISCONTINUED | OUTPATIENT
Start: 2021-09-03 | End: 2021-09-06

## 2021-09-03 RX ORDER — SODIUM CHLORIDE 9 MG/ML
1000 INJECTION, SOLUTION INTRAVENOUS
Refills: 0 | Status: DISCONTINUED | OUTPATIENT
Start: 2021-09-03 | End: 2021-09-06

## 2021-09-03 RX ORDER — TIOTROPIUM BROMIDE 18 UG/1
1 CAPSULE ORAL; RESPIRATORY (INHALATION) DAILY
Refills: 0 | Status: DISCONTINUED | OUTPATIENT
Start: 2021-09-03 | End: 2021-09-06

## 2021-09-03 RX ORDER — DEXTROSE 50 % IN WATER 50 %
15 SYRINGE (ML) INTRAVENOUS ONCE
Refills: 0 | Status: DISCONTINUED | OUTPATIENT
Start: 2021-09-03 | End: 2021-09-06

## 2021-09-03 RX ORDER — ATORVASTATIN CALCIUM 80 MG/1
80 TABLET, FILM COATED ORAL AT BEDTIME
Refills: 0 | Status: DISCONTINUED | OUTPATIENT
Start: 2021-09-03 | End: 2021-09-06

## 2021-09-03 RX ORDER — TAMSULOSIN HYDROCHLORIDE 0.4 MG/1
0.4 CAPSULE ORAL AT BEDTIME
Refills: 0 | Status: DISCONTINUED | OUTPATIENT
Start: 2021-09-03 | End: 2021-09-06

## 2021-09-03 RX ORDER — APIXABAN 2.5 MG/1
5 TABLET, FILM COATED ORAL EVERY 12 HOURS
Refills: 0 | Status: DISCONTINUED | OUTPATIENT
Start: 2021-09-04 | End: 2021-09-06

## 2021-09-03 RX ORDER — GLUCAGON INJECTION, SOLUTION 0.5 MG/.1ML
1 INJECTION, SOLUTION SUBCUTANEOUS ONCE
Refills: 0 | Status: DISCONTINUED | OUTPATIENT
Start: 2021-09-03 | End: 2021-09-06

## 2021-09-03 RX ORDER — ACETAMINOPHEN 500 MG
650 TABLET ORAL EVERY 6 HOURS
Refills: 0 | Status: DISCONTINUED | OUTPATIENT
Start: 2021-09-03 | End: 2021-09-06

## 2021-09-03 RX ORDER — METOPROLOL TARTRATE 50 MG
50 TABLET ORAL
Refills: 0 | Status: DISCONTINUED | OUTPATIENT
Start: 2021-09-03 | End: 2021-09-06

## 2021-09-03 RX ORDER — IPRATROPIUM/ALBUTEROL SULFATE 18-103MCG
3 AEROSOL WITH ADAPTER (GRAM) INHALATION EVERY 6 HOURS
Refills: 0 | Status: DISCONTINUED | OUTPATIENT
Start: 2021-09-03 | End: 2021-09-06

## 2021-09-03 RX ORDER — AZITHROMYCIN 500 MG/1
500 TABLET, FILM COATED ORAL ONCE
Refills: 0 | Status: COMPLETED | OUTPATIENT
Start: 2021-09-03 | End: 2021-09-03

## 2021-09-03 RX ORDER — CLOPIDOGREL BISULFATE 75 MG/1
75 TABLET, FILM COATED ORAL DAILY
Refills: 0 | Status: DISCONTINUED | OUTPATIENT
Start: 2021-09-03 | End: 2021-09-06

## 2021-09-03 RX ADMIN — AZITHROMYCIN 255 MILLIGRAM(S): 500 TABLET, FILM COATED ORAL at 22:42

## 2021-09-03 RX ADMIN — Medication 3 MILLILITER(S): at 20:51

## 2021-09-03 RX ADMIN — Medication 125 MILLIGRAM(S): at 19:30

## 2021-09-03 RX ADMIN — Medication 2: at 23:01

## 2021-09-03 RX ADMIN — Medication 3 MILLILITER(S): at 19:12

## 2021-09-03 NOTE — CONSULT NOTE ADULT - SUBJECTIVE AND OBJECTIVE BOX
81y  Male  No Known Allergies  shellfish (Nausea)    CC: Patient is a 81y old  Male who presents with a chief complaint of Shortness of Breath     HPI: 81 year old male with PMHx of MI, CAD s/p cath with stent to LAD  x 1, HTN, HLD and COPD on home oxygen and uses bipap qhs at home as well who presented to ER for SOB that started this evening around 1730. On arrival to ER he was SOB with some slight accessory muscle use per ER MD and he was placed on BIPAP. On my arrival i found him comfortable in bed on BIPAP stating his breathing was improved.     PAST MEDICAL & SURGICAL HISTORY:  Diabetes Mellitus, Type II  CAD (Coronary Artery Disease)  s/p 3v CABG ; stents placed in [place] in 2019  Dyslipidemia  Osteomyelitis  COPD (chronic obstructive pulmonary disease)  on 2L at home and BiPAP at night; intubated     Hypertension  PVD (peripheral vascular disease)  CABG (Coronary Artery Bypass Graft)    Compound fracture  left leg  S/P primary angioplasty with coronary stent      FAMILY HISTORY:  Family history of diabetes mellitus (Sibling)  Family hx of lung cancer  brother,  age 82, used to smoke with pt    Vital Signs Last 24 Hrs  T(C): 36.1 (03 Sep 2021 18:48), Max: 36.1 (03 Sep 2021 18:48)  T(F): 97 (03 Sep 2021 18:48), Max: 97 (03 Sep 2021 18:48)  HR: 112 (03 Sep 2021 18:56) (112 - 116)  BP: 116/77 (03 Sep 2021 18:48) (116/77 - 116/77)  BP(mean): --  RR: 24 (03 Sep 2021 18:48) (24 - 24)  SpO2: 100% (03 Sep 2021 18:56) (100% - 100%)  ABG - ( 03 Sep 2021 19:06 )  pH, Arterial: 7.30  pH, Blood: x     /  pCO2: 64    /  pO2: 523   / HCO3: 32    / Base Excess: 5.1   /  SaO2: 100.0       LABS  -    141  |  110<H>  |  22  ----------------------------<  224<H>  4.7   |  23  |  1.30  Ca    9.0      03 Sep 2021 19:27  TPro  7.2  /  Alb  3.3  /  TBili  0.2  /  DBili  x   /  AST  19  /  ALT  21  /  AlkPhos  86  -03                        13.0   15.68 )-----------( 218      ( 03 Sep 2021 19:27 )             43.8     CARDIAC MARKERS ( 03 Sep 2021 19:27 )  .022 ng/mL / x     / x     / x     / x          LIVER FUNCTIONS - ( 03 Sep 2021 19:27 )  Alb: 3.3 g/dL / Pro: 7.2 g/dL / ALK PHOS: 86 U/L / ALT: 21 U/L / AST: 19 U/L / GGT: x             MEDICATIONS  (STANDING):  azithromycin  IVPB 500 milliGRAM(s) IV Intermittent once      REVIEW OF SYSTEMS:    CONSTITUTIONAL: No fever, weight loss, or fatigue  EYES: No eye pain, visual disturbances, or discharge  ENMT:  No difficulty hearing, tinnitus, vertigo; No sinus or throat pain  NECK: No pain or stiffness  BREASTS: No pain, masses, or nipple discharge  RESPIRATORY: No cough, wheezing, chills or hemoptysis; No shortness of breath  CARDIOVASCULAR: No chest pain, palpitations, dizziness, or leg swelling  GASTROINTESTINAL: No abdominal or epigastric pain. No nausea, vomiting, or hematemesis; No diarrhea or constipation. No melena or hematochezia.  GENITOURINARY: No dysuria, frequency, hematuria, or incontinence  NEUROLOGICAL: No headaches, memory loss, loss of strength, numbness, or tremors  SKIN: No itching, burning, rashes, or lesions   LYMPH NODES: No enlarged glands  ENDOCRINE: No heat or cold intolerance; No hair loss  MUSCULOSKELETAL: No joint pain or swelling; No muscle, back, or extremity pain  PSYCHIATRIC: No depression, anxiety, mood swings, or difficulty sleeping  HEME/LYMPH: No easy bruising, or bleeding gums  ALLERY AND IMMUNOLOGIC: No hives or eczema      Physicial Exam:     Constitutional: NAD, well-groomed, well-developed  HEENT: PERRLA, EOMI, no drainage or redness  Neck: No bruits; no thyromegaly or nodules,  No JVD  Back: Normal spine flexure, No CVA tenderness, No deformity or limitation of movement  Respiratory: Breath Sounds equal & clear to percussion & auscultation, no accessory muscle use  Cardiovascular: Regular rate & rhythm, normal S1, S2; no murmurs, gallops or rubs; no S3, S4  Gastrointestinal: Soft, non-tender, non distended no hepatosplenomegaly, normal bowel sounds  Extremities: No peripheral edema, No cyanosis, clubbing   Vascular: Equal and normal pulses: 2+ peripheral pulses throughout  Neurological: GCS:    A&O x 3; no sensory, motor  deficits, normal reflexes  Psychiatric: Normal mood, normal affect  Musculoskeletal: No joint pain, swelling or deformity; no limitation of movement  Skin: No rashes             81y  Male  No Known Allergies  shellfish (Nausea)    CC: Patient is a 81y old  Male who presents with a chief complaint of Shortness of Breath     HPI: 81 year old male with PMHx of MI, CAD s/p cath with stent to LAD  x 1, HTN, HLD and COPD on home oxygen and uses bipap qhs at home as well who presented to ER for SOB that started this evening around 1730. On arrival to ER he was SOB with some slight accessory muscle use per ER MD and he was placed on BIPAP. On my arrival i found him comfortable in bed on BIPAP stating his breathing was improved. Patient is currently stable for admission  to [facility] not requiring ICU admission at this time.     PAST MEDICAL & SURGICAL HISTORY:  Diabetes Mellitus, Type II  CAD (Coronary Artery Disease)  s/p 3v CABG ; stents placed in [place] in   Dyslipidemia  Osteomyelitis  COPD (chronic obstructive pulmonary disease)  on 2L at home and BiPAP at night; intubated     Hypertension  PVD (peripheral vascular disease)  CABG (Coronary Artery Bypass Graft)    Compound fracture  left leg  S/P primary angioplasty with coronary stent      FAMILY HISTORY:  Family history of diabetes mellitus (Sibling)  Family hx of lung cancer  brother,  age 82, used to smoke with pt    Vital Signs Last 24 Hrs  T(C): 36.1 (03 Sep 2021 18:48), Max: 36.1 (03 Sep 2021 18:48)  T(F): 97 (03 Sep 2021 18:48), Max: 97 (03 Sep 2021 18:48)  HR: 112 (03 Sep 2021 18:56) (112 - 116)  BP: 116/77 (03 Sep 2021 18:48) (116/77 - 116/77)  BP(mean): --  RR: 24 (03 Sep 2021 18:48) (24 - 24)  SpO2: 100% (03 Sep 2021 18:56) (100% - 100%)  ABG - ( 03 Sep 2021 19:06 )  pH, Arterial: 7.30  pH, Blood: x     /  pCO2: 64    /  pO2: 523   / HCO3: 32    / Base Excess: 5.1   /  SaO2: 100.0       LABS  -    141  |  110<H>  |  22  ----------------------------<  224<H>  4.7   |  23  |  1.30  Ca    9.0      03 Sep 2021 19:27  TPro  7.2  /  Alb  3.3  /  TBili  0.2  /  DBili  x   /  AST  19  /  ALT  21  /  AlkPhos  86  -                        13.0   15.68 )-----------( 218      ( 03 Sep 2021 19:27 )             43.8     CARDIAC MARKERS ( 03 Sep 2021 19:27 )  .022 ng/mL / x     / x     / x     / x          LIVER FUNCTIONS - ( 03 Sep 2021 19:27 )  Alb: 3.3 g/dL / Pro: 7.2 g/dL / ALK PHOS: 86 U/L / ALT: 21 U/L / AST: 19 U/L / GGT: x             MEDICATIONS  (STANDING):  azithromycin  IVPB 500 milliGRAM(s) IV Intermittent once      REVIEW OF SYSTEMS:    CONSTITUTIONAL: No fever, weight loss, or fatigue  EYES: No eye pain, visual disturbances, or discharge  ENMT:  No difficulty hearing, tinnitus, vertigo; No sinus or throat pain  NECK: No pain or stiffness  BREASTS: No pain, masses, or nipple discharge  RESPIRATORY: No cough, wheezing, chills or hemoptysis; No shortness of breath  CARDIOVASCULAR: No chest pain, palpitations, dizziness, or leg swelling  GASTROINTESTINAL: No abdominal or epigastric pain. No nausea, vomiting, or hematemesis; No diarrhea or constipation. No melena or hematochezia.  GENITOURINARY: No dysuria, frequency, hematuria, or incontinence  NEUROLOGICAL: No headaches, memory loss, loss of strength, numbness, or tremors  SKIN: No itching, burning, rashes, or lesions   LYMPH NODES: No enlarged glands  ENDOCRINE: No heat or cold intolerance; No hair loss  MUSCULOSKELETAL: No joint pain or swelling; No muscle, back, or extremity pain  PSYCHIATRIC: No depression, anxiety, mood swings, or difficulty sleeping  HEME/LYMPH: No easy bruising, or bleeding gums  ALLERY AND IMMUNOLOGIC: No hives or eczema      Physicial Exam:     Constitutional: NAD, well-groomed, well-developed  HEENT: PERRLA, EOMI, no drainage or redness  Neck: No bruits; no thyromegaly or nodules,  No JVD  Back: Normal spine flexure, No CVA tenderness, No deformity or limitation of movement  Respiratory: Breath Sounds equal & clear to percussion & auscultation, no accessory muscle use  Cardiovascular: Regular rate & rhythm, normal S1, S2; no murmurs, gallops or rubs; no S3, S4  Gastrointestinal: Soft, non-tender, non distended no hepatosplenomegaly, normal bowel sounds  Extremities: No peripheral edema, No cyanosis, clubbing   Vascular: Equal and normal pulses: 2+ peripheral pulses throughout  Neurological: GCS:    A&O x 3; no sensory, motor  deficits, normal reflexes  Psychiatric: Normal mood, normal affect  Musculoskeletal: No joint pain, swelling or deformity; no limitation of movement  Skin: No rashes

## 2021-09-03 NOTE — H&P ADULT - HISTORY OF PRESENT ILLNESS
82 yo m pmh CAD s/p stent, HLD, HTN, PVD, COPD on home o2 and nocturnal bipap, type 2 Dm presents to ED with shortness of breath.     CBC, CMP, troponin, probnp, ABG, CXR were obtained. Significant for WBC 15.68, cl 110, glucose 224, troponin .022, probnp 152, ABG 7.3/64/523/32 on bipap fio2 100%. CXR neg for acute pathology   s/p duoneb x 2, solumedrol 125mg IV x 1 .  80 yo m pmh CAD s/p stent, HLD, HTN, PVD, COPD on prn home o2 and nocturnal bipap, type 2 Dm presents to ED with shortness of breath. History obtained from wife at bedside . Wife states that this afternoon she wanted her  to get some fresh air so she put him in the car to drive to the store. She opened the windows and left him in the car for 10-15 minutes while she went into the store. When she came out he was very short of breath. She carries a pulse ox and she checked and he was sating 80% with a . She noticed his oxygen concentrator was beeping and when she checked the unit it stated it was hot. She thought this was why he was short of breath so she took him home brought him to the basement and placed him on his home bipap. After a few minutes when he didnt look better she rechecked and he was sating in the 60s and hr was in the 160s so she called EMS.   Of note first week of august pt was admitted for chest pain and had stent placement. he was discharged on 8/7 with home care nurse and home pt. he has been doing well since then with no further complaints.     CBC, CMP, troponin, probnp, ABG, CXR were obtained. Significant for WBC 15.68, cl 110, glucose 224, troponin .022, probnp 152, ABG 7.3/64/523/32 on bipap fio2 100%. CXR neg for acute pathology   s/p duoneb x 2, solumedrol 125mg IV x 1 .

## 2021-09-03 NOTE — H&P ADULT - NSHPREVIEWOFSYSTEMS_GEN_ALL_CORE
Constitutional: denies fever, chills, general malaise  HEENT: denies dry mouth, sore throat, runny nose, visual changes  Respiratory: admits SOB, denies LA, cough, sputum production, wheezing, hemoptysis  Cardiovascular: denies CP, palpitations, edema  Gastrointestinal: denies nausea, vomiting, diarrhea, constipation, abdominal pain, melena, hematochezia   Genitourinary: denies urinary complaints  Skin/Breast: denies rash  Musculoskeletal: denies myalgias, arthritis, joint swelling, muscle weakness  Neurologic: denies syncope, LOC, headache, weakness  ROS negative except as noted above

## 2021-09-03 NOTE — H&P ADULT - NSHPPHYSICALEXAM_GEN_ALL_CORE
Vital Signs Last 24 Hrs  T(C): 36.1 (03 Sep 2021 18:48), Max: 36.1 (03 Sep 2021 18:48)  T(F): 97 (03 Sep 2021 18:48), Max: 97 (03 Sep 2021 18:48)  HR: 112 (03 Sep 2021 18:56) (112 - 116)  BP: 116/77 (03 Sep 2021 18:48) (116/77 - 116/77)  BP(mean): --  RR: 24 (03 Sep 2021 18:48) (24 - 24)  SpO2: 100% (03 Sep 2021 18:56) (100% - 100%)    General: Well developed, well nourished, NAD  HEENT: NCAT, PERRLA, EOMI bl, moist mucous membranes   Neck: Supple, nontender, no mass  Neurology: A&Ox3, nonfocal  Respiratory: CTA B/L, No W/R/R  CV: RRR, +S1/S2, no murmurs, rubs or gallops  Abdominal: Soft, NT, ND +BSx4  Extremities: No C/C/E, + peripheral pulses  MSK: Normal ROM, no joint erythema or warmth, no joint swelling   Skin: warm, dry, normal color Vital Signs Last 24 Hrs  T(C): 36.1 (03 Sep 2021 18:48), Max: 36.1 (03 Sep 2021 18:48)  T(F): 97 (03 Sep 2021 18:48), Max: 97 (03 Sep 2021 18:48)  HR: 112 (03 Sep 2021 18:56) (112 - 116)  BP: 116/77 (03 Sep 2021 18:48) (116/77 - 116/77)  BP(mean): --  RR: 24 (03 Sep 2021 18:48) (24 - 24)  SpO2: 100% (03 Sep 2021 18:56) (100% - 100%)    General: Well developed, well nourished, NAD on bipap  HEENT: NCAT, PERRLA, EOMI bl, moist mucous membranes   Neck: Supple, nontender, no mass  Neurology: A&Ox3, nonfocal  Respiratory: diminished b/l no w/r/r , no accessory muscle use   CV: RRR, +S1/S2, no murmurs, rubs or gallops  Abdominal: Soft, NT, ND +BSx4  Extremities: trace b/l pitting le  edema, + peripheral pulses  Skin: warm, dry, normal color

## 2021-09-03 NOTE — H&P ADULT - NSHPSOCIALHISTORY_GEN_ALL_CORE
Tobacco: Denies, former smoker-quit 30 years ago, 1ppd x 20 years  EtOH: Denies  Recreational drug use: former frequent marijuana - last use 2-3 yrs ago  Lives with: Wife  Ambulates: Without assistance  COVID vaccinated Tobacco: Denies, former smoker-quit 30 years ago, 1ppd x 20 years  EtOH: Denies  Recreational drug use: former frequent marijuana - last use 2-3 yrs ago  Lives with: Wife  Ambulates: Without assistance  COVID vaccinated moderna 2/2 march 2021

## 2021-09-03 NOTE — ED PROVIDER NOTE - CARE PLAN
Principal Discharge DX:	COPD exacerbation  Secondary Diagnosis:	COPD with respiratory distress, acute   1

## 2021-09-03 NOTE — H&P ADULT - ASSESSMENT
80 yo m pmh CAD s/p stent, HLD, HTN, PVD, COPD on home o2 and nocturnal bipap, type 2 Dm presents to ED with shortness of breath admit for acute on chronic hypercapnic hypoxic respiratory failure 2/2 copd exacerbation     #Acute Hypoxic and Hypercapnic Respiratory failure   - pt presenting with shortness of breath, ABG 7.3/64/523/32 on BIPAP 100%fio2  - admit for acute on chronic hypoxic hypercapnic respiratory failure 2/2 COPD exacerbation   - currently sating well on bipap. c/w bipap, fio2 decreased to 40% given abg   - s/p solumedrol 125mg x 1, continue with 40 iv BID   - continue with duonebs q 6 hours prn   - continue with zithromax x 5 days   - no evidence of pneumonia on CXR. afebrile , leukocytosis likely related to recent steroid use. trend.   - f/u RVP, covid pcr   - Consult [facility]     #CAD  - CAD s/p recent stent   - troponin neg on presentation   - EKG:   - continue home     #HTN  - BP wnl   - continue     # Type 2 DM   - BG elevated on presenation, glucose 224   - labile blood glucose givne steroid use   - .....  - hypoglycemia protocol   - am A1c     # VTE ppx   -    80 yo m pmh CAD s/p stent, HLD,  PVD, COPD on home o2 prn and nocturnal bipap, type 2 Dm presents to ED with shortness of breath admit for acute on chronic hypercapnic hypoxic respiratory failure 2/2 copd exacerbation     #Acute Hypoxic and Hypercapnic Respiratory failure   - pt presenting with shortness of breath, ABG 7.3/64/523/32 on BIPAP 100%fio2  - admit for acute on chronic hypoxic hypercapnic respiratory failure 2/2 COPD exacerbation   - currently sating well on bipap. c/w bipap, fio2 decreased to 40% given abg   - s/p solumedrol 125mg x 1, continue with 40 iv BID   - continue with duonebs q 6 hours prn   - continue with zithromax x 5 days (as outpatient was on prophylactic zithromax 3x week)  - no evidence of pneumonia on CXR. afebrile , leukocytosis likely related to recent steroid use. trend.   - f/u RVP, covid pcr   - Consult [facility]     #CAD  - CAD s/p recent stent Aug 2021  - troponin neg on presentation   - EKG: NSR no ischemic changes 99bpm  - continue home plavix, beta blocker  - continue statin, eliquis     # Type 2 DM   - BG elevated on presenation, glucose 224   - labile blood glucose given steroid use  - hold home meds, start insulin coverage scale   - hypoglycemia protocol   - am A1c     #BPH  - resume home flomax     # VTE ppx   - therapeutic on eliquis

## 2021-09-03 NOTE — ED PROVIDER NOTE - CLINICAL SUMMARY MEDICAL DECISION MAKING FREE TEXT BOX
pt p/w sob, resp distress, bipap, nebs, cxr, ekg ok, steroids, check probnp, rvp, icu consulted, abg, pt improved with tx and nebs, zithromax, admit tele as per icu

## 2021-09-03 NOTE — ED PROVIDER NOTE - PROGRESS NOTE DETAILS
dw icu pa, pt improved, no increased wob, pt ok to go to tele on bipap at his current settings and doesn't need unit at this time. d/w dr frost

## 2021-09-03 NOTE — H&P ADULT - NSHPLABSRESULTS_GEN_ALL_CORE
13.0   15.68 )-----------( 218      ( 03 Sep 2021 19:27 )             43.8     03 Sep 2021 19:27    141    |  110    |  22     ----------------------------<  224    4.7     |  23     |  1.30     Ca    9.0        03 Sep 2021 19:27    TPro  7.2    /  Alb  3.3    /  TBili  0.2    /  DBili  x      /  AST  19     /  ALT  21     /  AlkPhos  86     03 Sep 2021 19:27    LIVER FUNCTIONS - ( 03 Sep 2021 19:27 )  Alb: 3.3 g/dL / Pro: 7.2 g/dL / ALK PHOS: 86 U/L / ALT: 21 U/L / AST: 19 U/L / GGT: x             CAPILLARY BLOOD GLUCOSE        CARDIAC MARKERS ( 03 Sep 2021 19:27 )  .022 ng/mL / x     / x     / x     / x

## 2021-09-03 NOTE — ED ADULT NURSE NOTE - OBJECTIVE STATEMENT
pt to er by ambulance sob pta placed on bipap pta upon arrival is awake and alert resp even on machine skin warm and dry trace pedal edema noted iv started 22 angio right thumb wife at bedside with pt

## 2021-09-03 NOTE — CONSULT NOTE ADULT - ASSESSMENT
81 year old male with hisory of COPD on home O2 and nocturnal BIPAP at home as well is being  admitted for acute on chronic hypoxic hypercapnic respiratory failure secondary to COPD exacerbation   At this time he is hemodynamically stable with improvement on BIPAP with stable oxygen sats in the ER and does not require ICU admission stable for transfer to [facility] on BIPAP with home settings untill morning.   recommend steroids BID after the initial dose continue Duoneb q 6 hours prn and consider azithro for 5 days and pulmonology consult     Time spent: 30 mins assessing presenting problems of acute illness that poses high probability  end organ damage/dysfunction.  Medical decision making including plan of daily care, reviewing data, reviewing radiology, discussing with multidisciplinary team, non inclusive of procedures, discussing goals of care with patient/family

## 2021-09-04 LAB
ALBUMIN SERPL ELPH-MCNC: 3.2 G/DL — LOW (ref 3.3–5)
ALP SERPL-CCNC: 88 U/L — SIGNIFICANT CHANGE UP (ref 40–120)
ALT FLD-CCNC: 21 U/L — SIGNIFICANT CHANGE UP (ref 12–78)
ANION GAP SERPL CALC-SCNC: 8 MMOL/L — SIGNIFICANT CHANGE UP (ref 5–17)
AST SERPL-CCNC: 27 U/L — SIGNIFICANT CHANGE UP (ref 15–37)
BILIRUB SERPL-MCNC: 0.3 MG/DL — SIGNIFICANT CHANGE UP (ref 0.2–1.2)
BUN SERPL-MCNC: 25 MG/DL — HIGH (ref 7–23)
CALCIUM SERPL-MCNC: 9 MG/DL — SIGNIFICANT CHANGE UP (ref 8.5–10.1)
CHLORIDE SERPL-SCNC: 109 MMOL/L — HIGH (ref 96–108)
CO2 SERPL-SCNC: 27 MMOL/L — SIGNIFICANT CHANGE UP (ref 22–31)
CREAT SERPL-MCNC: 1.5 MG/DL — HIGH (ref 0.5–1.3)
GLUCOSE SERPL-MCNC: 267 MG/DL — HIGH (ref 70–99)
HCT VFR BLD CALC: 39.5 % — SIGNIFICANT CHANGE UP (ref 39–50)
HGB BLD-MCNC: 11.8 G/DL — LOW (ref 13–17)
LACTATE SERPL-SCNC: 2 MMOL/L — SIGNIFICANT CHANGE UP (ref 0.7–2)
MAGNESIUM SERPL-MCNC: 2.1 MG/DL — SIGNIFICANT CHANGE UP (ref 1.6–2.6)
MCHC RBC-ENTMCNC: 26.9 PG — LOW (ref 27–34)
MCHC RBC-ENTMCNC: 29.9 GM/DL — LOW (ref 32–36)
MCV RBC AUTO: 90 FL — SIGNIFICANT CHANGE UP (ref 80–100)
NRBC # BLD: 0 /100 WBCS — SIGNIFICANT CHANGE UP (ref 0–0)
PHOSPHATE SERPL-MCNC: 4.5 MG/DL — SIGNIFICANT CHANGE UP (ref 2.5–4.5)
PLATELET # BLD AUTO: 184 K/UL — SIGNIFICANT CHANGE UP (ref 150–400)
POTASSIUM SERPL-MCNC: 5.2 MMOL/L — SIGNIFICANT CHANGE UP (ref 3.5–5.3)
POTASSIUM SERPL-SCNC: 5.2 MMOL/L — SIGNIFICANT CHANGE UP (ref 3.5–5.3)
PROT SERPL-MCNC: 6.8 G/DL — SIGNIFICANT CHANGE UP (ref 6–8.3)
RBC # BLD: 4.39 M/UL — SIGNIFICANT CHANGE UP (ref 4.2–5.8)
RBC # FLD: 14.1 % — SIGNIFICANT CHANGE UP (ref 10.3–14.5)
SODIUM SERPL-SCNC: 144 MMOL/L — SIGNIFICANT CHANGE UP (ref 135–145)
WBC # BLD: 7.83 K/UL — SIGNIFICANT CHANGE UP (ref 3.8–10.5)
WBC # FLD AUTO: 7.83 K/UL — SIGNIFICANT CHANGE UP (ref 3.8–10.5)

## 2021-09-04 RX ORDER — INFLUENZA VIRUS VACCINE 15; 15; 15; 15 UG/.5ML; UG/.5ML; UG/.5ML; UG/.5ML
0.5 SUSPENSION INTRAMUSCULAR ONCE
Refills: 0 | Status: COMPLETED | OUTPATIENT
Start: 2021-09-04 | End: 2021-09-06

## 2021-09-04 RX ADMIN — CLOPIDOGREL BISULFATE 75 MILLIGRAM(S): 75 TABLET, FILM COATED ORAL at 11:19

## 2021-09-04 RX ADMIN — Medication 50 MILLIGRAM(S): at 17:07

## 2021-09-04 RX ADMIN — ATORVASTATIN CALCIUM 80 MILLIGRAM(S): 80 TABLET, FILM COATED ORAL at 21:13

## 2021-09-04 RX ADMIN — APIXABAN 5 MILLIGRAM(S): 2.5 TABLET, FILM COATED ORAL at 06:37

## 2021-09-04 RX ADMIN — Medication 40 MILLIGRAM(S): at 17:07

## 2021-09-04 RX ADMIN — TIOTROPIUM BROMIDE 1 CAPSULE(S): 18 CAPSULE ORAL; RESPIRATORY (INHALATION) at 06:37

## 2021-09-04 RX ADMIN — Medication 50 MILLIGRAM(S): at 06:37

## 2021-09-04 RX ADMIN — TAMSULOSIN HYDROCHLORIDE 0.4 MILLIGRAM(S): 0.4 CAPSULE ORAL at 21:13

## 2021-09-04 RX ADMIN — Medication 2: at 12:24

## 2021-09-04 RX ADMIN — Medication 3: at 17:14

## 2021-09-04 RX ADMIN — BUDESONIDE AND FORMOTEROL FUMARATE DIHYDRATE 2 PUFF(S): 160; 4.5 AEROSOL RESPIRATORY (INHALATION) at 06:37

## 2021-09-04 RX ADMIN — AZITHROMYCIN 255 MILLIGRAM(S): 500 TABLET, FILM COATED ORAL at 21:13

## 2021-09-04 RX ADMIN — Medication 81 MILLIGRAM(S): at 11:19

## 2021-09-04 RX ADMIN — BUDESONIDE AND FORMOTEROL FUMARATE DIHYDRATE 2 PUFF(S): 160; 4.5 AEROSOL RESPIRATORY (INHALATION) at 18:03

## 2021-09-04 RX ADMIN — LOSARTAN POTASSIUM 25 MILLIGRAM(S): 100 TABLET, FILM COATED ORAL at 06:37

## 2021-09-04 RX ADMIN — APIXABAN 5 MILLIGRAM(S): 2.5 TABLET, FILM COATED ORAL at 17:07

## 2021-09-04 RX ADMIN — Medication 3: at 08:10

## 2021-09-04 RX ADMIN — Medication 40 MILLIGRAM(S): at 06:37

## 2021-09-04 RX ADMIN — Medication 2: at 21:34

## 2021-09-04 NOTE — CONSULT NOTE ADULT - SUBJECTIVE AND OBJECTIVE BOX
YUE COTTO    PLV TELE 306 W1    Patient is a 81y old  Male who presents with a chief complaint of COPD exacerbation (03 Sep 2021 21:05)       Allergies    No Known Allergies    Intolerances    shellfish (Nausea)      HPI:  82 yo m pmh CAD s/p stent, HLD, HTN, PVD, COPD on prn home o2 and nocturnal bipap, type 2 Dm presents to ED with shortness of breath. History obtained from wife at bedside . Wife states that this afternoon she wanted her  to get some fresh air so she put him in the car to drive to the store. She opened the windows and left him in the car for 10-15 minutes while she went into the store. When she came out he was very short of breath. She carries a pulse ox and she checked and he was sating 80% with a . She noticed his oxygen concentrator was beeping and when she checked the unit it stated it was hot. She thought this was why he was short of breath so she took him home brought him to the basement and placed him on his home bipap. After a few minutes when he didnt look better she rechecked and he was sating in the 60s and hr was in the 160s so she called EMS.   Of note first week of august pt was admitted for chest pain and had stent placement. he was discharged on  with home care nurse and home pt. he has been doing well since then with no further complaints.     CBC, CMP, troponin, probnp, ABG, CXR were obtained. Significant for WBC 15.68, cl 110, glucose 224, troponin .022, probnp 152, ABG 7.3/64/523/32 on bipap fio2 100%. CXR neg for acute pathology   s/p duoneb x 2, solumedrol 125mg IV x 1 .  (03 Sep 2021 21:03)      PAST MEDICAL & SURGICAL HISTORY:  Diabetes Mellitus, Type II    CAD (Coronary Artery Disease)  s/p 3v CABG ; stents placed in [place] in     Dyslipidemia    Osteomyelitis    COPD (chronic obstructive pulmonary disease)  on 2L at home and BiPAP at night; intubated     Hypertension    PVD (peripheral vascular disease)    CABG (Coronary Artery Bypass Graft)      Compound fracture  left leg    S/P primary angioplasty with coronary stent        FAMILY HISTORY:  Family history of diabetes mellitus (Sibling)    Family hx of lung cancer  brother,  age 82, used to smoke with pt          MEDICATIONS   acetaminophen   Tablet .. 650 milliGRAM(s) Oral every 6 hours PRN  albuterol/ipratropium for Nebulization 3 milliLiter(s) Nebulizer every 6 hours PRN  apixaban 5 milliGRAM(s) Oral every 12 hours  aspirin enteric coated 81 milliGRAM(s) Oral daily  atorvastatin 80 milliGRAM(s) Oral at bedtime  azithromycin  IVPB 500 milliGRAM(s) IV Intermittent every 24 hours  budesonide 160 MICROgram(s)/formoterol 4.5 MICROgram(s) Inhaler 2 Puff(s) Inhalation two times a day  clopidogrel Tablet 75 milliGRAM(s) Oral daily  dextrose 40% Gel 15 Gram(s) Oral once  dextrose 5%. 1000 milliLiter(s) IV Continuous <Continuous>  dextrose 5%. 1000 milliLiter(s) IV Continuous <Continuous>  dextrose 50% Injectable 25 Gram(s) IV Push once  dextrose 50% Injectable 12.5 Gram(s) IV Push once  dextrose 50% Injectable 25 Gram(s) IV Push once  glucagon  Injectable 1 milliGRAM(s) IntraMuscular once  influenza   Vaccine 0.5 milliLiter(s) IntraMuscular once  insulin lispro (ADMELOG) corrective regimen sliding scale   SubCutaneous three times a day before meals  insulin lispro (ADMELOG) corrective regimen sliding scale   SubCutaneous at bedtime  losartan 25 milliGRAM(s) Oral daily  methylPREDNISolone sodium succinate Injectable 40 milliGRAM(s) IV Push two times a day  metoprolol tartrate 50 milliGRAM(s) Oral two times a day  tamsulosin 0.4 milliGRAM(s) Oral at bedtime  tiotropium 18 MICROgram(s) Capsule 1 Capsule(s) Inhalation daily      Vital Signs Last 24 Hrs  T(C): 36.6 (04 Sep 2021 05:41), Max: 36.8 (03 Sep 2021 22:52)  T(F): 97.8 (04 Sep 2021 05:41), Max: 98.3 (03 Sep 2021 22:52)  HR: 66 (04 Sep 2021 07:54) (66 - 116)  BP: 124/83 (04 Sep 2021 05:41) (109/69 - 126/69)  BP(mean): --  RR: 20 (04 Sep 2021 05:41) (20 - 24)  SpO2: 100% (04 Sep 2021 07:54) (98% - 100%)      21 @ 07:01  -  21 @ 07:00  --------------------------------------------------------  IN: 0 mL / OUT: 200 mL / NET: -200 mL    21 @ 07:01  -  21 @ 11:46  --------------------------------------------------------  IN: 0 mL / OUT: 550 mL / NET: -550 mL            LABS:                        11.8   7.83  )-----------( 184      ( 04 Sep 2021 08:21 )             39.5         144  |  109<H>  |  25<H>  ----------------------------<  267<H>  5.2   |  27  |  1.50<H>    Ca    9.0      04 Sep 2021 08:21  Phos  4.5       Mg     2.1         TPro  6.8  /  Alb  3.2<L>  /  TBili  0.3  /  DBili  x   /  AST  27  /  ALT  21  /  AlkPhos  88            ABG - ( 03 Sep 2021 19:06 )  pH, Arterial: 7.30  pH, Blood: x     /  pCO2: 64    /  pO2: 523   / HCO3: 32    / Base Excess: 5.1   /  SaO2: 100.0               WBC:  WBC Count: 7.83 K/uL ( @ 08:21)  WBC Count: 15.68 K/uL ( @ 19:27)      MICROBIOLOGY:  RECENT CULTURES:        CARDIAC MARKERS ( 03 Sep 2021 19:27 )  .022 ng/mL / x     / x     / x     / x                Sodium:  Sodium, Serum: 144 mmol/L ( @ 08:21)  Sodium, Serum: 141 mmol/L ( @ 19:27)      1.50 mg/dL  @ 08:21  1.30 mg/dL  @ 19:27      Hemoglobin:  Hemoglobin: 11.8 g/dL ( @ 08:21)  Hemoglobin: 13.0 g/dL ( @ 19:27)      Platelets: Platelet Count - Automated: 184 K/uL ( @ 08:21)  Platelet Count - Automated: 218 K/uL ( @ 19:27)      LIVER FUNCTIONS - ( 04 Sep 2021 08:21 )  Alb: 3.2 g/dL / Pro: 6.8 g/dL / ALK PHOS: 88 U/L / ALT: 21 U/L / AST: 27 U/L / GGT: x                 RADIOLOGY & ADDITIONAL STUDIES:

## 2021-09-04 NOTE — PROGRESS NOTE ADULT - ASSESSMENT
82 yo m pmh CAD s/p stent, HLD,  PVD, COPD on home o2 prn and nocturnal bipap, type 2 Dm presents to ED with shortness of breath admit for acute on chronic hypercapnic hypoxic respiratory failure 2/2 copd exacerbation     #Acute Hypoxic and Hypercapnic Respiratory failure 2/2 COPD exacerbation  - pt presenting with shortness of breath, ABG 7.3/64/523/32  - no evidence of pneumonia on CXR.  - On BIPAP 100%fio2 w/o respiratory distress, pt used overnight at home.   - NC 2 lt ordered and well tolerated for patient, no respiratory distress and Sat 98%. Continue BiPAP night time   - solumedrol 40 iv BID   - continue duonebs q 6 hours prn   - continue with zithromax x 5 days (as outpatient was on prophylactic zithromax 3x week)  - Afebrile , leukocytosis improved from yesterday - today 7.83  - Negative RVP, covid pcr   - Consult [facility]     #CAD  - CAD s/p recent stent Aug 2021  - troponin neg on presentation   - EKG: NSR no ischemic changes 99bpm  - continue home plavix, beta blocker  - continue statin, eliquis     # Type 2 DM   - BG elevated on presenation, glucose 224   - labile blood glucose given steroid use  - hold home meds, start insulin coverage scale   - hypoglycemia protocol   - A1c ordered    #BPH  - resume home flomax     # VTE ppx   - therapeutic on eliquis

## 2021-09-04 NOTE — CONSULT NOTE ADULT - ASSESSMENT
COMMODORE TURNER 81 m 2021  [facility] [phone]  1940  [place]     Initial evaluation/Pulmonary Critical Care consultation requested on 2021   by Dr SWAN   from Dr Dejesus   Patient examined chart reviewed    HOSPITAL ADMISSION   PATIENT CAME  FROM (if information available)      COMMODORE TURNER 81 m 2021  [facility] [phone]  1940  [place]     REVIEW OF SYMPTOMS      Able to give ROS  Yes     RELIABLE +/-   CONSTITUTIONAL Weakness Yes  Chills No   ENDOCRINE  No heat or cold intolerance    ALLERGY No hives  Sore throat No stridor  RESP Coughing blood no  Shortness of breath YES   NEURO No Headache  Confusion Pain neck No   CARDIAC No Chest pain No Palpitations   GI  Pain abdomen NO   Vomiting NO     PHYSICAL EXAM    HEENT Unremarkable  atraumatic   RESP Fair air entry EXP prolonged    Harsh breath sound Resp distres mild   CARDIAC S1 S2 No S3     NO JVD    ABDOMEN SOFT BS PRESENT NOT DISTENDED No hepatosplenomegaly PEDAL EDEMA present No calf tenderness  NO rash       2021 PATIENT PRESENTATION.  79 m former smoker PMH HTN, DM2 (on home Metformin and glipizide), COPD (2L home/ BIPAP at night), CAD with 3v CABG 2004, Stent 2019 HLD, ECHO 2020 ECHO preserved lvsf dd1 ivc dilated but appears 50% collapse with respn     hx hypercapnic resp failure with ho intubation c/b with cardiac arrest (2018) with ho recurrent admissions GOLD GRADE D admitted 9/3 pm with AOC hypercapnic resp failure COPD ex      2021 PRESENTING PROBLEMS.        COPD ex  RESP FAILURE      PMH.    FORMER SMOKER   COPD GRADE D 2L NOCT BPAP   2019 FVC 2.20 58% Post 2.61 69% +18 FEV1   0.86 31% Post 0.89 32% +4%  FEV1% 39%     INTUBATION CAC 2018    CAD 3V CABG 2004   CAD STENT 2019    HFPEF 2020 DD1    DM2                  GENERAL ISSUES  GOC ADVANCED DIRECTIVE.                            ALLERGY.   nka                         WEIGHT.         2021 80                            BMI.                   2021 24    BEST PRACTICE ISSUES.                                                  HEAD OF BED ELEVATION. Yes  DVT PROPHYLAXIS.  apixaban 5.3 (9/3) (Continued as home med reason for use of apixaban not clear form chart)                                       MCCORMICK PROPHYLAXIS.                                                                                         DIET.     cons carb (9/3)                      INFECTION PROPHYLAXIS.                      PATIENT DATA   TUBES  PROCEDURES.          ABG. OXYGENATION.       2021 7p bpap /100%730/64/523               VITALS/IO/VENT/DRIPS.     2021 afeb 65 100/60  2021 7a          PROBLEM ASSESSMENT/RECOMMENDATIONS.    COVID STATUS  scv2 9/3 (-)   INFECTION  w 2021 w 7.8   rvp 9/3 (-)   azithro (9/3)   COPD ex   LACTICEMIA  la 9/3- la 3.1-2  COPD ex  duoneb p (9/3)   symbicort (9/3)   solumed 40.2 (9/3)  spiriva (9/3)   Cont rx    RESP FAILURE  Was on bpap last night now on nc   looks improvd  CAD  2021 nucear stesstest ef 54% Hypokinesis septal distal ant and apical walls Mod periinfarct ischemia anteroseptal and apicl    HO STENT 2021   ASA 81 (8/3)  atorvastat 8 (9/3)   plavix 75 (9/3)   metoprolol 50.2 (9/3)   No active ischemia   Cont rx  CHF  echo 8/3/2021 segmental hypokinesia [id number] in lvsf cw 2018 ef 45%  losartan 25 (9/3)   ANEMIA  Hb 2021 Hb 11.8   mcv 2021 mv 90   CKD  Cr 2021 Cr 1.5           OVERALL PLAN.  COPD ex BD steroids abio  RESP FAILURE on bpap   CHF  CAD    TIME SPENT   Over 55 minutes aggregate care time spent on encounter; activities included   direct patient care, counseling and/or coordinating care reviewing notes, lab data/ imaging , discussion with multidisciplinary team/ patient  /family and explaining in detail risks, benefits, alternatives  of the recommendations     COMMODORE TURNER 81 m 2021  [facility] [phone]  1940  [place]

## 2021-09-05 LAB
A1C WITH ESTIMATED AVERAGE GLUCOSE RESULT: 7.5 % — HIGH (ref 4–5.6)
ANION GAP SERPL CALC-SCNC: 6 MMOL/L — SIGNIFICANT CHANGE UP (ref 5–17)
BASOPHILS # BLD AUTO: 0 K/UL — SIGNIFICANT CHANGE UP (ref 0–0.2)
BASOPHILS NFR BLD AUTO: 0 % — SIGNIFICANT CHANGE UP (ref 0–2)
BUN SERPL-MCNC: 30 MG/DL — HIGH (ref 7–23)
CALCIUM SERPL-MCNC: 9.4 MG/DL — SIGNIFICANT CHANGE UP (ref 8.5–10.1)
CHLORIDE SERPL-SCNC: 105 MMOL/L — SIGNIFICANT CHANGE UP (ref 96–108)
CO2 SERPL-SCNC: 30 MMOL/L — SIGNIFICANT CHANGE UP (ref 22–31)
CREAT SERPL-MCNC: 1.4 MG/DL — HIGH (ref 0.5–1.3)
EOSINOPHIL # BLD AUTO: 0 K/UL — SIGNIFICANT CHANGE UP (ref 0–0.5)
EOSINOPHIL NFR BLD AUTO: 0 % — SIGNIFICANT CHANGE UP (ref 0–6)
ESTIMATED AVERAGE GLUCOSE: 169 MG/DL — HIGH (ref 68–114)
GLUCOSE SERPL-MCNC: 220 MG/DL — HIGH (ref 70–99)
HCT VFR BLD CALC: 38 % — LOW (ref 39–50)
HGB BLD-MCNC: 11.9 G/DL — LOW (ref 13–17)
IMM GRANULOCYTES NFR BLD AUTO: 0.5 % — SIGNIFICANT CHANGE UP (ref 0–1.5)
LYMPHOCYTES # BLD AUTO: 1.05 K/UL — SIGNIFICANT CHANGE UP (ref 1–3.3)
LYMPHOCYTES # BLD AUTO: 8.8 % — LOW (ref 13–44)
MCHC RBC-ENTMCNC: 27.4 PG — SIGNIFICANT CHANGE UP (ref 27–34)
MCHC RBC-ENTMCNC: 31.3 GM/DL — LOW (ref 32–36)
MCV RBC AUTO: 87.4 FL — SIGNIFICANT CHANGE UP (ref 80–100)
MONOCYTES # BLD AUTO: 0.9 K/UL — SIGNIFICANT CHANGE UP (ref 0–0.9)
MONOCYTES NFR BLD AUTO: 7.5 % — SIGNIFICANT CHANGE UP (ref 2–14)
NEUTROPHILS # BLD AUTO: 9.96 K/UL — HIGH (ref 1.8–7.4)
NEUTROPHILS NFR BLD AUTO: 83.2 % — HIGH (ref 43–77)
NRBC # BLD: 0 /100 WBCS — SIGNIFICANT CHANGE UP (ref 0–0)
PLATELET # BLD AUTO: 206 K/UL — SIGNIFICANT CHANGE UP (ref 150–400)
POTASSIUM SERPL-MCNC: 4.5 MMOL/L — SIGNIFICANT CHANGE UP (ref 3.5–5.3)
POTASSIUM SERPL-SCNC: 4.5 MMOL/L — SIGNIFICANT CHANGE UP (ref 3.5–5.3)
RBC # BLD: 4.35 M/UL — SIGNIFICANT CHANGE UP (ref 4.2–5.8)
RBC # FLD: 14 % — SIGNIFICANT CHANGE UP (ref 10.3–14.5)
SODIUM SERPL-SCNC: 141 MMOL/L — SIGNIFICANT CHANGE UP (ref 135–145)
WBC # BLD: 11.97 K/UL — HIGH (ref 3.8–10.5)
WBC # FLD AUTO: 11.97 K/UL — HIGH (ref 3.8–10.5)

## 2021-09-05 PROCEDURE — 99232 SBSQ HOSP IP/OBS MODERATE 35: CPT | Mod: GC

## 2021-09-05 RX ADMIN — APIXABAN 5 MILLIGRAM(S): 2.5 TABLET, FILM COATED ORAL at 05:37

## 2021-09-05 RX ADMIN — Medication 4: at 12:19

## 2021-09-05 RX ADMIN — Medication 4: at 17:26

## 2021-09-05 RX ADMIN — Medication 40 MILLIGRAM(S): at 05:37

## 2021-09-05 RX ADMIN — CLOPIDOGREL BISULFATE 75 MILLIGRAM(S): 75 TABLET, FILM COATED ORAL at 12:19

## 2021-09-05 RX ADMIN — Medication 4: at 21:25

## 2021-09-05 RX ADMIN — Medication 3: at 08:56

## 2021-09-05 RX ADMIN — LOSARTAN POTASSIUM 25 MILLIGRAM(S): 100 TABLET, FILM COATED ORAL at 05:37

## 2021-09-05 RX ADMIN — APIXABAN 5 MILLIGRAM(S): 2.5 TABLET, FILM COATED ORAL at 17:26

## 2021-09-05 RX ADMIN — TIOTROPIUM BROMIDE 1 CAPSULE(S): 18 CAPSULE ORAL; RESPIRATORY (INHALATION) at 05:37

## 2021-09-05 RX ADMIN — BUDESONIDE AND FORMOTEROL FUMARATE DIHYDRATE 2 PUFF(S): 160; 4.5 AEROSOL RESPIRATORY (INHALATION) at 05:37

## 2021-09-05 RX ADMIN — Medication 81 MILLIGRAM(S): at 12:19

## 2021-09-05 RX ADMIN — AZITHROMYCIN 255 MILLIGRAM(S): 500 TABLET, FILM COATED ORAL at 21:21

## 2021-09-05 RX ADMIN — TAMSULOSIN HYDROCHLORIDE 0.4 MILLIGRAM(S): 0.4 CAPSULE ORAL at 21:21

## 2021-09-05 RX ADMIN — Medication 40 MILLIGRAM(S): at 17:31

## 2021-09-05 RX ADMIN — ATORVASTATIN CALCIUM 80 MILLIGRAM(S): 80 TABLET, FILM COATED ORAL at 21:21

## 2021-09-05 RX ADMIN — Medication 50 MILLIGRAM(S): at 17:37

## 2021-09-05 RX ADMIN — BUDESONIDE AND FORMOTEROL FUMARATE DIHYDRATE 2 PUFF(S): 160; 4.5 AEROSOL RESPIRATORY (INHALATION) at 17:30

## 2021-09-05 NOTE — PROGRESS NOTE ADULT - ASSESSMENT
COMMODORE YUE 81 m 2021  [facility] [phone]  1940  [place]     REVIEW OF SYMPTOMS      Able to give ROS  Yes     RELIABLE +/-   CONSTITUTIONAL Weakness Yes  Chills No   ENDOCRINE  No heat or cold intolerance    ALLERGY No hives  Sore throat No stridor  RESP Coughing blood no  Shortness of breath YES   NEURO No Headache  Confusion Pain neck No   CARDIAC No Chest pain No Palpitations   GI  Pain abdomen NO   Vomiting NO     PHYSICAL EXAM    HEENT Unremarkable  atraumatic   RESP Fair air entry EXP prolonged    Harsh breath sound Resp distres mild   CARDIAC S1 S2 No S3     NO JVD    ABDOMEN SOFT BS PRESENT NOT DISTENDED No hepatosplenomegaly PEDAL EDEMA present No calf tenderness  NO rash       2021 PATIENT PRESENTATION.  79 m former smoker PMH HTN, DM2 (on home Metformin and glipizide), COPD (2L home/ BIPAP at night), CAD with 3v CABG , Stent 2019 HLD, ECHO 2020 ECHO preserved lvsf dd1 ivc dilated but appears 50% collapse with respn     hx hypercapnic resp failure with ho intubation c/b with cardiac arrest (2018) with ho recurrent admissions GOLD GRADE D admitted 9/3 pm with AOC hypercapnic resp failure COPD ex      2021 PRESENTING PROBLEMS.        COPD ex  RESP FAILURE      PMH.    FORMER SMOKER   COPD GRADE D 2L NOCT BPAP   2019 FVC 2.20 58% Post 2.61 69% +18 FEV1   0.86 31% Post 0.89 32% +4%  FEV1% 39%     INTUBATION CAC 2018    CAD 3V CABG    CAD STENT 2019    HFPEF 2020 DD1    DM2        BEST PRACTICE ISSUES.                                                  HEAD OF BED ELEVATION. Yes  DVT PROPHYLAXIS.  apixaban 5.3 (9/3) (Continued as home med reason for use of apixaban not clear form chart)                                       MCCORMICK PROPHYLAXIS.                                                                                         DIET.     cons carb (9/3)                      INFECTION PROPHYLAXIS.                        GENERAL ISSUES  GOC ADVANCED DIRECTIVE.                            ALLERGY.   nka                         WEIGHT.         2021 80                            BMI.                   2021 24    PATIENT DATA   TUBES  PROCEDURES.          ABG.  2021 7p bpap 18/8/100%730/64/523       OXYGENATION.              2021 ra 91%     VITALS/IO/VENT/DRIPS.     2021 afeb 87 110/60      PROBLEM ASSESSMENT/RECOMMENDATIONS.    COVID STATUS  scv2 9/3 (-)   INFECTION  w -2021 w 7.8 - 11.9    rvp 9/3 (-)   azithro (9/3)   COPD ex   LACTICEMIA  la 9/3- la 3.1-2  COPD ex  duoneb p (9/3)   symbicort (9/3)   solumed 40.2 (9/3)  spiriva (9/3)   Cont rx    At dc pred 30 taper over 5 d   RESP FAILURE  Was on bpap last night now on nc   looks improvd  CAD  2021 nucear stesstest ef 54% Hypokinesis septal distal ant and apical walls Mod periinfarct ischemia anteroseptal and apicl    HO STENT 2021   ASA 81 (8/3)  atorvastat 8 (9/3)   plavix 75 (9/3)   metoprolol 50.2 (9/3)   No active ischemia   Cont rx  CHF  echo 8/3/2021 segmental hypokinesia [id number] in lvsf cw 2018 ef 45%  losartan 25 (9/3)   ANEMIA  Hb 2021 Hb 11.8   mcv 2021 mv 90   CKD  Cr -2021 Cr 1.5 - 1.4         OVERALL PLAN.  COPD ex BD steroids abio  RESP FAILURE on noct bpap   CHF  CAD      TIME SPENT   Over 25 minutes aggregate care time spent on encounter; activities included   direct patient care, counseling and/or coordinating care reviewing notes, lab data/ imaging , discussion with multidisciplinary team/ patient  /family and explaining in detail risks, benefits, alternatives  of the recommendations     COMMODORE TURNER 81 m 2021  [facility] [phone]  1940  [place]

## 2021-09-05 NOTE — PROGRESS NOTE ADULT - ASSESSMENT
82 yo m pmh CAD s/p stent, HLD,  PVD, COPD on home o2 prn and nocturnal bipap, type 2 Dm presents to ED with shortness of breath admit for acute on chronic hypercapnic hypoxic respiratory failure 2/2 copd exacerbation     #Acute Hypoxic and Hypercapnic Respiratory failure 2/2 COPD exacerbation  - Pt presenting with shortness of breath, respiratory acidosis and hypoxemia.   - No evidence of pneumonia on CXR, Negative RVP and covid pcr   - On BIPAP 100%fio2 w/o respiratory distress, pt used overnight at home.   - solumedrol 40 iv BID   - continue duonebs q 6 hours prn   - continue with zithromax x 5 days (as outpatient was on prophylactic zithromax 3x week)  - Clinical improvement, Sat 98% RA at rest.    - Afebrile, mild leukocytosis today  - Pulmonology following.     #CAD  - CAD s/p recent stent Aug 2021  - troponin neg on presentation   - EKG: NSR no ischemic changes 99bpm  - continue home plavix, beta blocker  - continue statin, eliquis     #CHF  - No signs of acute decompensation, ischemia or signs of overload.   - FE 45%  - On home meds.   - Normal electrolytes.     # Type 2 DM   - BG elevated on presentation glucose 224   - Hyperglycemia with current steroid use  - On insulin coverage scale   - hypoglycemia protocol   - A1c 7.7%    # CKD stage 3   - Baseline Cr ~1.3  - Admission with BUN 22/ Cr 1.3  - BUN 30/ Cr 1.4 today     #BPH  - resume home flomax     # VTE ppx   - therapeutic on eliquis    80 yo m pmh CAD s/p stent, HLD,  PVD, COPD on home o2 prn and nocturnal bipap, type 2 Dm presents to ED with shortness of breath admit for acute on chronic hypercapnic hypoxic respiratory failure 2/2 copd exacerbation     #Acute Hypoxic and Hypercapnic Respiratory failure 2/2 COPD exacerbation  - Pt presenting with shortness of breath, respiratory acidosis and hypoxemia.   - No evidence of pneumonia on CXR, Negative RVP and covid pcr   - On BIPAP 100%fio2 w/o respiratory distress, pt used overnight at home.   - solumedrol 40 iv BID   - continue duonebs q 6 hours prn   - continue with zithromax x 5 days (as outpatient was on prophylactic zithromax 3x week)  - Clinical improvement, Sat 98% RA at rest.    - Afebrile, mild leukocytosis today more likely reactive.   - If clinically improvement, patient can be discharged tomorrow to continue on oral meds and inh.   - Pulmonology following.     #CAD  - CAD s/p recent stent Aug 2021  - troponin neg on presentation   - EKG: NSR no ischemic changes 99bpm  - continue home plavix, beta blocker  - continue statin, eliquis     #CHF  - No signs of acute decompensation, ischemia or signs of overload.   - FE 45%  - On home meds.   - Normal electrolytes.     # Type 2 DM   - BG elevated on presentation glucose 224   - Hyperglycemia with current steroid use  - On insulin coverage scale   - hypoglycemia protocol   - A1c 7.7%    # CKD stage 3   - Baseline Cr ~1.3  - Admission with BUN 22/ Cr 1.3  - BUN 30/ Cr 1.4 today     #BPH  - resume home flomax     # VTE ppx   - therapeutic on eliquis    80 yo m pmh CAD s/p stent, HLD,  PVD, COPD on home o2 prn and nocturnal bipap, type 2 Dm presents to ED with shortness of breath admit for acute on chronic hypercapnic hypoxic respiratory failure 2/2 copd exacerbation     #Acute Hypoxic and Hypercapnic Respiratory failure 2/2 COPD exacerbation  - Pt presenting with shortness of breath, respiratory acidosis and hypoxemia.   - No evidence of pneumonia on CXR, Negative RVP and covid pcr   - On BIPAP 100%fio2 w/o respiratory distress, pt used overnight at home.   - solumedrol 40 iv BID   - continue duonebs q 6 hours prn   - continue with zithromax x 5 days (as outpatient was on prophylactic zithromax 3x week)  - Clinical improvement, Sat 98% RA at rest.    - Afebrile, mild leukocytosis today more likely reactive.   - If clinically improvement, patient can be discharged tomorrow to continue on oral meds and inh.   - Pulmonology following.   - f/u CBC and temp.     #CAD  - CAD s/p recent stent Aug 2021  - troponin neg on presentation   - EKG: NSR no ischemic changes 99bpm  - continue home plavix, beta blocker  - continue statin, eliquis     #CHF  - No signs of acute decompensation, ischemia or signs of overload.   - FE 45%  - On home meds.   - Normal electrolytes.     # Type 2 DM   - BG elevated on presentation glucose 224   - Hyperglycemia with current steroid use  - On insulin coverage scale   - hypoglycemia protocol   - A1c 7.7%    # CKD stage 3   - Baseline Cr ~1.3  - Admission with BUN 22/ Cr 1.3  - BUN 30/ Cr 1.4 today     #BPH  - resume home flomax     # VTE ppx   - therapeutic on eliquis

## 2021-09-06 ENCOUNTER — TRANSCRIPTION ENCOUNTER (OUTPATIENT)
Age: 81
End: 2021-09-06

## 2021-09-06 VITALS
HEART RATE: 90 BPM | DIASTOLIC BLOOD PRESSURE: 66 MMHG | TEMPERATURE: 98 F | RESPIRATION RATE: 18 BRPM | OXYGEN SATURATION: 95 % | SYSTOLIC BLOOD PRESSURE: 114 MMHG

## 2021-09-06 LAB
ANION GAP SERPL CALC-SCNC: 6 MMOL/L — SIGNIFICANT CHANGE UP (ref 5–17)
BASOPHILS # BLD AUTO: 0.01 K/UL — SIGNIFICANT CHANGE UP (ref 0–0.2)
BASOPHILS NFR BLD AUTO: 0.1 % — SIGNIFICANT CHANGE UP (ref 0–2)
BUN SERPL-MCNC: 31 MG/DL — HIGH (ref 7–23)
CALCIUM SERPL-MCNC: 9.2 MG/DL — SIGNIFICANT CHANGE UP (ref 8.5–10.1)
CHLORIDE SERPL-SCNC: 104 MMOL/L — SIGNIFICANT CHANGE UP (ref 96–108)
CO2 SERPL-SCNC: 32 MMOL/L — HIGH (ref 22–31)
COVID-19 SPIKE DOMAIN AB INTERP: POSITIVE
COVID-19 SPIKE DOMAIN ANTIBODY RESULT: 239 U/ML — HIGH
CREAT SERPL-MCNC: 1.4 MG/DL — HIGH (ref 0.5–1.3)
EOSINOPHIL # BLD AUTO: 0 K/UL — SIGNIFICANT CHANGE UP (ref 0–0.5)
EOSINOPHIL NFR BLD AUTO: 0 % — SIGNIFICANT CHANGE UP (ref 0–6)
GLUCOSE SERPL-MCNC: 206 MG/DL — HIGH (ref 70–99)
HCT VFR BLD CALC: 40.7 % — SIGNIFICANT CHANGE UP (ref 39–50)
HGB BLD-MCNC: 12.3 G/DL — LOW (ref 13–17)
IMM GRANULOCYTES NFR BLD AUTO: 0.4 % — SIGNIFICANT CHANGE UP (ref 0–1.5)
LYMPHOCYTES # BLD AUTO: 1.35 K/UL — SIGNIFICANT CHANGE UP (ref 1–3.3)
LYMPHOCYTES # BLD AUTO: 13.9 % — SIGNIFICANT CHANGE UP (ref 13–44)
MCHC RBC-ENTMCNC: 27 PG — SIGNIFICANT CHANGE UP (ref 27–34)
MCHC RBC-ENTMCNC: 30.2 GM/DL — LOW (ref 32–36)
MCV RBC AUTO: 89.5 FL — SIGNIFICANT CHANGE UP (ref 80–100)
MONOCYTES # BLD AUTO: 0.8 K/UL — SIGNIFICANT CHANGE UP (ref 0–0.9)
MONOCYTES NFR BLD AUTO: 8.2 % — SIGNIFICANT CHANGE UP (ref 2–14)
NEUTROPHILS # BLD AUTO: 7.53 K/UL — HIGH (ref 1.8–7.4)
NEUTROPHILS NFR BLD AUTO: 77.4 % — HIGH (ref 43–77)
NRBC # BLD: 0 /100 WBCS — SIGNIFICANT CHANGE UP (ref 0–0)
PLATELET # BLD AUTO: 208 K/UL — SIGNIFICANT CHANGE UP (ref 150–400)
POTASSIUM SERPL-MCNC: 4.9 MMOL/L — SIGNIFICANT CHANGE UP (ref 3.5–5.3)
POTASSIUM SERPL-SCNC: 4.9 MMOL/L — SIGNIFICANT CHANGE UP (ref 3.5–5.3)
RBC # BLD: 4.55 M/UL — SIGNIFICANT CHANGE UP (ref 4.2–5.8)
RBC # FLD: 14.1 % — SIGNIFICANT CHANGE UP (ref 10.3–14.5)
SARS-COV-2 IGG+IGM SERPL QL IA: 239 U/ML — HIGH
SARS-COV-2 IGG+IGM SERPL QL IA: POSITIVE
SODIUM SERPL-SCNC: 142 MMOL/L — SIGNIFICANT CHANGE UP (ref 135–145)
WBC # BLD: 9.73 K/UL — SIGNIFICANT CHANGE UP (ref 3.8–10.5)
WBC # FLD AUTO: 9.73 K/UL — SIGNIFICANT CHANGE UP (ref 3.8–10.5)

## 2021-09-06 PROCEDURE — 83880 ASSAY OF NATRIURETIC PEPTIDE: CPT

## 2021-09-06 PROCEDURE — 80053 COMPREHEN METABOLIC PANEL: CPT

## 2021-09-06 PROCEDURE — 93005 ELECTROCARDIOGRAM TRACING: CPT

## 2021-09-06 PROCEDURE — 85027 COMPLETE CBC AUTOMATED: CPT

## 2021-09-06 PROCEDURE — 99291 CRITICAL CARE FIRST HOUR: CPT

## 2021-09-06 PROCEDURE — 87040 BLOOD CULTURE FOR BACTERIA: CPT

## 2021-09-06 PROCEDURE — 36415 COLL VENOUS BLD VENIPUNCTURE: CPT

## 2021-09-06 PROCEDURE — 71045 X-RAY EXAM CHEST 1 VIEW: CPT

## 2021-09-06 PROCEDURE — 99239 HOSP IP/OBS DSCHRG MGMT >30: CPT

## 2021-09-06 PROCEDURE — 86769 SARS-COV-2 COVID-19 ANTIBODY: CPT

## 2021-09-06 PROCEDURE — 94760 N-INVAS EAR/PLS OXIMETRY 1: CPT

## 2021-09-06 PROCEDURE — 83735 ASSAY OF MAGNESIUM: CPT

## 2021-09-06 PROCEDURE — 0225U NFCT DS DNA&RNA 21 SARSCOV2: CPT

## 2021-09-06 PROCEDURE — 36600 WITHDRAWAL OF ARTERIAL BLOOD: CPT

## 2021-09-06 PROCEDURE — 85025 COMPLETE CBC W/AUTO DIFF WBC: CPT

## 2021-09-06 PROCEDURE — 90686 IIV4 VACC NO PRSV 0.5 ML IM: CPT

## 2021-09-06 PROCEDURE — 82962 GLUCOSE BLOOD TEST: CPT

## 2021-09-06 PROCEDURE — 94660 CPAP INITIATION&MGMT: CPT

## 2021-09-06 PROCEDURE — 94640 AIRWAY INHALATION TREATMENT: CPT

## 2021-09-06 PROCEDURE — 83036 HEMOGLOBIN GLYCOSYLATED A1C: CPT

## 2021-09-06 PROCEDURE — 84484 ASSAY OF TROPONIN QUANT: CPT

## 2021-09-06 PROCEDURE — 80048 BASIC METABOLIC PNL TOTAL CA: CPT

## 2021-09-06 PROCEDURE — 83605 ASSAY OF LACTIC ACID: CPT

## 2021-09-06 PROCEDURE — 84100 ASSAY OF PHOSPHORUS: CPT

## 2021-09-06 PROCEDURE — 82803 BLOOD GASES ANY COMBINATION: CPT

## 2021-09-06 PROCEDURE — 96374 THER/PROPH/DIAG INJ IV PUSH: CPT

## 2021-09-06 RX ORDER — ACETAMINOPHEN 500 MG
2 TABLET ORAL
Qty: 0 | Refills: 0 | DISCHARGE
Start: 2021-09-06

## 2021-09-06 RX ORDER — INSULIN LISPRO 100/ML
VIAL (ML) SUBCUTANEOUS AT BEDTIME
Refills: 0 | Status: DISCONTINUED | OUTPATIENT
Start: 2021-09-06 | End: 2021-09-06

## 2021-09-06 RX ORDER — INSULIN LISPRO 100/ML
VIAL (ML) SUBCUTANEOUS
Refills: 0 | Status: DISCONTINUED | OUTPATIENT
Start: 2021-09-06 | End: 2021-09-06

## 2021-09-06 RX ADMIN — Medication 2: at 08:29

## 2021-09-06 RX ADMIN — APIXABAN 5 MILLIGRAM(S): 2.5 TABLET, FILM COATED ORAL at 05:57

## 2021-09-06 RX ADMIN — CLOPIDOGREL BISULFATE 75 MILLIGRAM(S): 75 TABLET, FILM COATED ORAL at 11:27

## 2021-09-06 RX ADMIN — TIOTROPIUM BROMIDE 1 CAPSULE(S): 18 CAPSULE ORAL; RESPIRATORY (INHALATION) at 05:57

## 2021-09-06 RX ADMIN — Medication 8: at 17:22

## 2021-09-06 RX ADMIN — BUDESONIDE AND FORMOTEROL FUMARATE DIHYDRATE 2 PUFF(S): 160; 4.5 AEROSOL RESPIRATORY (INHALATION) at 06:01

## 2021-09-06 RX ADMIN — APIXABAN 5 MILLIGRAM(S): 2.5 TABLET, FILM COATED ORAL at 17:22

## 2021-09-06 RX ADMIN — INFLUENZA VIRUS VACCINE 0.5 MILLILITER(S): 15; 15; 15; 15 SUSPENSION INTRAMUSCULAR at 18:22

## 2021-09-06 RX ADMIN — BUDESONIDE AND FORMOTEROL FUMARATE DIHYDRATE 2 PUFF(S): 160; 4.5 AEROSOL RESPIRATORY (INHALATION) at 17:28

## 2021-09-06 RX ADMIN — Medication 40 MILLIGRAM(S): at 17:22

## 2021-09-06 RX ADMIN — Medication 6: at 11:50

## 2021-09-06 RX ADMIN — Medication 40 MILLIGRAM(S): at 05:57

## 2021-09-06 RX ADMIN — Medication 50 MILLIGRAM(S): at 17:22

## 2021-09-06 RX ADMIN — Medication 81 MILLIGRAM(S): at 11:27

## 2021-09-06 NOTE — DISCHARGE NOTE NURSING/CASE MANAGEMENT/SOCIAL WORK - NSDCPEFALRISK_GEN_ALL_CORE
For information on Fall & injury Prevention, visit [email or web address]

## 2021-09-06 NOTE — DISCHARGE NOTE PROVIDER - CARE PROVIDER_API CALL
Sarah Avila)  Internal Medicine  [address] [place] [address]  Phone: [phone]  Fax: [phone]  Follow Up Time: 1 week    Cardiologist,   Phone: (   )    -  Fax: (   )    -  Follow Up Time: 1 week    Oscar Marcelino)  Critical Care Medicine; Pulmonary Disease  [address], [facility] [address]  [place], [place] [id number]  Phone: [phone]  Fax: [phone]  Follow Up Time: 2 weeks

## 2021-09-06 NOTE — DISCHARGE NOTE PROVIDER - NSDCCPCAREPLAN_GEN_ALL_CORE_FT
PRINCIPAL DISCHARGE DIAGNOSIS  Diagnosis: COPD exacerbation  Assessment and Plan of Treatment: You were given course of IV azithromycin and IV steroids. Your breathing improved and you were staurating well on room air. Please continue nocturnal BiPAP and home oxygen as needed. Please continue prednisone 40mg daily for 3 more days (prescription sent to pharmacy) and then can resume home prednisone (5mg) thereafter. Please continue all home medications and inhalers as prescribed. Please follow up with your PCP and Pulmonologist within 2 weeks of discharge.      SECONDARY DISCHARGE DIAGNOSES  Diagnosis: CAD (coronary artery disease)  Assessment and Plan of Treatment: Continue plavix and statin. STOP aspirin (this medication was supposed to be taken for only 2 weeks after your recent stent placement). Continue metoprolol (hold if HR <60). Please follow upw ith your cardiologist within 1 week fo discharge  Pleasecontinue eliquis for your Afib    Diagnosis: Diabetes  Assessment and Plan of Treatment: Please continue your diabetic medications. your fingersticks might be elevated while you finish your course of prednisone. If your FS are greater than 400, please contact your PCP/Endocrinologist.

## 2021-09-06 NOTE — DISCHARGE NOTE PROVIDER - PROVIDER TOKENS
PROVIDER:[TOKEN:[[id number]],FOLLOWUP:[1 week]],FREE:[LAST:[Cardiologist],PHONE:[(   )    -],FAX:[(   )    -],FOLLOWUP:[1 week]],PROVIDER:[TOKEN:[[id number]],FOLLOWUP:[2 weeks]]

## 2021-09-06 NOTE — DISCHARGE NOTE PROVIDER - NSDCMRMEDTOKEN_GEN_ALL_CORE_FT
acetaminophen 325 mg oral tablet: 2 tab(s) orally every 6 hours, As needed, Temp greater or equal to 38C (100.4F), Mild Pain (1 - 3)  apixaban 5 mg oral tablet: 1 tab(s) orally every 12 hours  azithromycin 250 mg oral tablet: 1 tab(s) orally Monday, Wednesday, and Friday  Breo Ellipta 200 mcg-25 mcg/inh inhalation powder: 1 puff(s) inhaled once a day  clopidogrel 75 mg oral tablet: 1 tab(s) orally once a day  Incruse Ellipta 62.5 mcg/inh inhalation powder: 1 puff(s) inhaled every 24 hours  Jardiance 25 mg oral tablet: 1 tab(s) orally once a day (in the morning)  losartan 25 mg oral tablet: 1 tab(s) orally once a day  metFORMIN 1000 mg oral tablet: 1 tab(s) orally 2 times a day w/food  please resume the dose on 08/10/2021   metoprolol tartrate 50 mg oral tablet: 1 tab(s) orally 2 times a day  NovoLOG FlexPen 100 units/mL injectable solution: Inject 5 units at BS over 200, 10 units at BS over 300, and 15 units at BS over 400  Nucala 100 mg subcutaneous injection: 100 milligram(s) subcutaneous every 4 weeks    *Last given 4/30/21*  predniSONE 10 mg oral tablet: 4 tab(s) orally once a day   rosuvastatin 20 mg oral tablet: 1 tab(s) orally once a day (at bedtime)  tamsulosin 0.4 mg oral capsule: 1 cap(s) orally once a day (at bedtime)  Ventolin HFA 90 mcg/inh inhalation aerosol: 2 puff(s) inhaled every 6 hours, As Needed

## 2021-09-06 NOTE — DISCHARGE NOTE NURSING/CASE MANAGEMENT/SOCIAL WORK - PATIENT PORTAL LINK FT
You can access the FollowMyHealth Patient Portal offered by [facility] by registering at the following website: [email or web address]. By joining [facility]’s FollowMyHealth portal, you will also be able to view your health information using other applications (apps) compatible with our system.

## 2021-09-06 NOTE — DISCHARGE NOTE NURSING/CASE MANAGEMENT/SOCIAL WORK - NSDCVIVACCINE_GEN_ALL_CORE_FT
influenza, injectable, quadrivalent, preservative free; 18-Oct-2019 13:33; Faiza Mclaughlin (RN); [employer]; [id number] (Exp. Date: 30-Jun-2020); IntraMuscular; Deltoid Left.; 0.5 milliLiter(s); VIS (VIS Published: 15-Aug-2019, VIS Presented: 18-Oct-2019);   influenza, injectable, quadrivalent, preservative free; 26-Sep-2020 15:14; Cory Crooks (RN); [employer]; [id number] (Exp. Date: 30-Jun-2021); IntraMuscular; Deltoid Left.; 0.5 milliLiter(s); VIS (VIS Published: 15-Aug-2019, VIS Presented: 26-Sep-2020);

## 2021-09-06 NOTE — PROGRESS NOTE ADULT - SUBJECTIVE AND OBJECTIVE BOX
YUE COTTO    PLV TELE 306 W1    Patient is a 81y old  Male who presents with a chief complaint of COPD exacerbation (05 Sep 2021 10:36)       Allergies    No Known Allergies    Intolerances    shellfish (Nausea)      HPI:  80 yo m pmh CAD s/p stent, HLD, HTN, PVD, COPD on prn home o2 and nocturnal bipap, type 2 Dm presents to ED with shortness of breath. History obtained from wife at bedside . Wife states that this afternoon she wanted her  to get some fresh air so she put him in the car to drive to the store. She opened the windows and left him in the car for 10-15 minutes while she went into the store. When she came out he was very short of breath. She carries a pulse ox and she checked and he was sating 80% with a . She noticed his oxygen concentrator was beeping and when she checked the unit it stated it was hot. She thought this was why he was short of breath so she took him home brought him to the basement and placed him on his home bipap. After a few minutes when he didnt look better she rechecked and he was sating in the 60s and hr was in the 160s so she called EMS.   Of note first week of august pt was admitted for chest pain and had stent placement. he was discharged on  with home care nurse and home pt. he has been doing well since then with no further complaints.     CBC, CMP, troponin, probnp, ABG, CXR were obtained. Significant for WBC 15.68, cl 110, glucose 224, troponin .022, probnp 152, ABG 7.3/64/523/32 on bipap fio2 100%. CXR neg for acute pathology   s/p duoneb x 2, solumedrol 125mg IV x 1 .  (03 Sep 2021 21:03)      PAST MEDICAL & SURGICAL HISTORY:  Diabetes Mellitus, Type II    CAD (Coronary Artery Disease)  s/p 3v CABG ; stents placed in [place] in     Dyslipidemia    Osteomyelitis    COPD (chronic obstructive pulmonary disease)  on 2L at home and BiPAP at night; intubated     Hypertension    PVD (peripheral vascular disease)    CABG (Coronary Artery Bypass Graft)      Compound fracture  left leg    S/P primary angioplasty with coronary stent        FAMILY HISTORY:  Family history of diabetes mellitus (Sibling)    Family hx of lung cancer  brother,  age 82, used to smoke with pt          MEDICATIONS   acetaminophen   Tablet .. 650 milliGRAM(s) Oral every 6 hours PRN  albuterol/ipratropium for Nebulization 3 milliLiter(s) Nebulizer every 6 hours PRN  apixaban 5 milliGRAM(s) Oral every 12 hours  aspirin enteric coated 81 milliGRAM(s) Oral daily  atorvastatin 80 milliGRAM(s) Oral at bedtime  azithromycin  IVPB 500 milliGRAM(s) IV Intermittent every 24 hours  budesonide 160 MICROgram(s)/formoterol 4.5 MICROgram(s) Inhaler 2 Puff(s) Inhalation two times a day  clopidogrel Tablet 75 milliGRAM(s) Oral daily  dextrose 40% Gel 15 Gram(s) Oral once  dextrose 5%. 1000 milliLiter(s) IV Continuous <Continuous>  dextrose 5%. 1000 milliLiter(s) IV Continuous <Continuous>  dextrose 50% Injectable 25 Gram(s) IV Push once  dextrose 50% Injectable 12.5 Gram(s) IV Push once  dextrose 50% Injectable 25 Gram(s) IV Push once  glucagon  Injectable 1 milliGRAM(s) IntraMuscular once  influenza   Vaccine 0.5 milliLiter(s) IntraMuscular once  insulin lispro (ADMELOG) corrective regimen sliding scale   SubCutaneous three times a day before meals  insulin lispro (ADMELOG) corrective regimen sliding scale   SubCutaneous at bedtime  losartan 25 milliGRAM(s) Oral daily  methylPREDNISolone sodium succinate Injectable 40 milliGRAM(s) IV Push two times a day  metoprolol tartrate 50 milliGRAM(s) Oral two times a day  tamsulosin 0.4 milliGRAM(s) Oral at bedtime  tiotropium 18 MICROgram(s) Capsule 1 Capsule(s) Inhalation daily      Vital Signs Last 24 Hrs  T(C): 36.7 (06 Sep 2021 04:07), Max: 37 (05 Sep 2021 18:58)  T(F): 98 (06 Sep 2021 04:07), Max: 98.6 (05 Sep 2021 18:58)  HR: 59 (06 Sep 2021 07:37) (59 - 87)  BP: 102/63 (06 Sep 2021 04:07) (102/63 - 115/63)  BP(mean): --  RR: 20 (06 Sep 2021 04:07) (18 - 20)  SpO2: 98% (06 Sep 2021 07:37) (91% - 100%)      21 @ 07:01  -  21 @ 07:00  --------------------------------------------------------  IN: 450 mL / OUT: 950 mL / NET: -500 mL            LABS:                        12.3   9.73  )-----------( 208      ( 06 Sep 2021 07:21 )             40.7         142  |  104  |  31<H>  ----------------------------<  206<H>  4.9   |  32<H>  |  1.40<H>    Ca    9.2      06 Sep 2021 07:21                WBC:  WBC Count: 9.73 K/uL (:21)  WBC Count: 11.97 K/uL ( 05:53)  WBC Count: 7.83 K/uL ( 08:21)  WBC Count: 15.68 K/uL ( 19:27)      MICROBIOLOGY:  RECENT CULTURES:   .Blood Blood XXXX XXXX   No growth to date.                    Sodium:  Sodium, Serum: 142 mmol/L (:21)  Sodium, Serum: 141 mmol/L ( 05:53)  Sodium, Serum: 144 mmol/L ( 08:21)  Sodium, Serum: 141 mmol/L ( 19:27)      1.40 mg/dL :21  1.40 mg/dL :53  1.50 mg/dL  08:21  1.30 mg/dL :27      Hemoglobin:  Hemoglobin: 12.3 g/dL ( 07:21)  Hemoglobin: 11.9 g/dL ( 05:53)  Hemoglobin: 11.8 g/dL ( 08:21)  Hemoglobin: 13.0 g/dL ( 19:27)      Platelets: Platelet Count - Automated: 208 K/uL (09-06 @ 07:21)  Platelet Count - Automated: 206 K/uL ( @ 05:53)  Platelet Count - Automated: 184 K/uL ( @ 08:21)  Platelet Count - Automated: 218 K/uL ( @ 19:27)              RADIOLOGY & ADDITIONAL STUDIES:  
Patient is a 81y old  Male who presents with a chief complaint of COPD exacerbation (05 Sep 2021 08:57)    Subjective:  INTERVAL HPI/OVERNIGHT EVENTS: Patient seen and examined at bedside. No overnight events occurred, he was on BiPAP and sleeping good. Last bowel movement 2 days ago, no abdominal pain or bloating. Patient has no complaints. Denies fevers, chills, headache, chest pain, dyspnea, ortopnea, abdominal pain, or urinary symptoms.     MEDICATIONS  (STANDING):  apixaban 5 milliGRAM(s) Oral every 12 hours  aspirin enteric coated 81 milliGRAM(s) Oral daily  atorvastatin 80 milliGRAM(s) Oral at bedtime  azithromycin  IVPB 500 milliGRAM(s) IV Intermittent every 24 hours  budesonide 160 MICROgram(s)/formoterol 4.5 MICROgram(s) Inhaler 2 Puff(s) Inhalation two times a day  clopidogrel Tablet 75 milliGRAM(s) Oral daily  dextrose 40% Gel 15 Gram(s) Oral once  dextrose 5%. 1000 milliLiter(s) (50 mL/Hr) IV Continuous <Continuous>  dextrose 5%. 1000 milliLiter(s) (100 mL/Hr) IV Continuous <Continuous>  dextrose 50% Injectable 25 Gram(s) IV Push once  dextrose 50% Injectable 12.5 Gram(s) IV Push once  dextrose 50% Injectable 25 Gram(s) IV Push once  glucagon  Injectable 1 milliGRAM(s) IntraMuscular once  influenza   Vaccine 0.5 milliLiter(s) IntraMuscular once  insulin lispro (ADMELOG) corrective regimen sliding scale   SubCutaneous three times a day before meals  insulin lispro (ADMELOG) corrective regimen sliding scale   SubCutaneous at bedtime  losartan 25 milliGRAM(s) Oral daily  methylPREDNISolone sodium succinate Injectable 40 milliGRAM(s) IV Push two times a day  metoprolol tartrate 50 milliGRAM(s) Oral two times a day  tamsulosin 0.4 milliGRAM(s) Oral at bedtime  tiotropium 18 MICROgram(s) Capsule 1 Capsule(s) Inhalation daily    MEDICATIONS  (PRN):  acetaminophen   Tablet .. 650 milliGRAM(s) Oral every 6 hours PRN Temp greater or equal to 38C (100.4F), Mild Pain (1 - 3)  albuterol/ipratropium for Nebulization 3 milliLiter(s) Nebulizer every 6 hours PRN Shortness of Breath and/or Wheezing      Allergies  No Known Allergies    Intolerances    shellfish (Nausea)      REVIEW OF SYSTEMS:  CONSTITUTIONAL: No fever or chills  HEENT:  No headache, no sore throat  RESPIRATORY: No cough, wheezing, or shortness of breath  CARDIOVASCULAR: No chest pain, palpitations  GASTROINTESTINAL: No abd pain, nausea, vomiting. Constipation   GENITOURINARY: No dysuria, frequency, or hematuria  NEUROLOGICAL: no focal weakness or dizziness  MUSCULOSKELETAL: no myalgias     Objective:  Vital Signs Last 24 Hrs  T(C): 36.6 (05 Sep 2021 04:42), Max: 36.6 (05 Sep 2021 04:42)  T(F): 97.8 (05 Sep 2021 04:42), Max: 97.8 (05 Sep 2021 04:42)  HR: 55 (05 Sep 2021 08:31) (51 - 66)  BP: 119/63 (05 Sep 2021 05:36) (98/62 - 129/71)  RR: 18 (05 Sep 2021 04:42) (18 - 18)  SpO2: 98% (05 Sep 2021 08:31) (98% - 100%)    GENERAL: NAD, lying in bed comfortably  HEAD:  Atraumatic, Normocephalic  EYES: EOMI, PERRLA, conjunctiva and sclera clear  ENT: Moist mucous membranes  NECK: Supple, No JVD  CHEST/LUNG: breathing at RA. Symmetric breath sounds, occasional basilar wheeze. Unlabored respirations  HEART: Regular rate and rhythm; No murmurs.   ABDOMEN: Bowel sounds present; Soft, Nontender, Nondistended.  EXTREMITIES: No clubbing, cyanosis, or edema  NERVOUS SYSTEM:  Alert & Oriented X3, speech clear. No signs of acute motor or sensory deficit.    SKIN: No rashes or lesions    LABS:                        11.9   11.97 )-----------( 206      ( 05 Sep 2021 05:53 )             38.0     CBC Full  -  ( 05 Sep 2021 05:53 )  WBC Count : 11.97 K/uL  Hemoglobin : 11.9 g/dL  Hematocrit : 38.0 %  Platelet Count - Automated : 206 K/uL  Mean Cell Volume : 87.4 fl  Mean Cell Hemoglobin : 27.4 pg  Mean Cell Hemoglobin Concentration : 31.3 gm/dL  Auto Neutrophil # : 9.96 K/uL  Auto Lymphocyte # : 1.05 K/uL  Auto Monocyte # : 0.90 K/uL  Auto Eosinophil # : 0.00 K/uL  Auto Basophil # : 0.00 K/uL  Auto Neutrophil % : 83.2 %  Auto Lymphocyte % : 8.8 %  Auto Monocyte % : 7.5 %  Auto Eosinophil % : 0.0 %  Auto Basophil % : 0.0 %    05 Sep 2021 05:53    141    |  105    |  30     ----------------------------<  220    4.5     |  30     |  1.40     Ca    9.4        05 Sep 2021 05:53      CAPILLARY BLOOD GLUCOSE    POCT Blood Glucose.: 252 mg/dL (05 Sep 2021 08:53)  POCT Blood Glucose.: 201 mg/dL (05 Sep 2021 07:32)  POCT Blood Glucose.: 307 mg/dL (04 Sep 2021 21:27)  POCT Blood Glucose.: 252 mg/dL (04 Sep 2021 17:11)  POCT Blood Glucose.: 240 mg/dL (04 Sep 2021 11:57)      Culture - Blood (collected 09-04-21 @ 00:44)  Source: .Blood Blood  Preliminary Report (09-05-21 @ 01:01):    No growth to date.    Culture - Blood (collected 09-04-21 @ 00:44)  Source: .Blood Blood  Preliminary Report (09-05-21 @ 01:01):    No growth to date.        RADIOLOGY & ADDITIONAL TESTS:    Personally reviewed.     Consultant(s) Notes Reviewed:  [x] YES  [ ] NO    
    YUE COTTO    PLV TELE 306 W1    Patient is a 81y old  Male who presents with a chief complaint of COPD exacerbation (04 Sep 2021 13:20)       Allergies    No Known Allergies    Intolerances    shellfish (Nausea)      HPI:  80 yo m pmh CAD s/p stent, HLD, HTN, PVD, COPD on prn home o2 and nocturnal bipap, type 2 Dm presents to ED with shortness of breath. History obtained from wife at bedside . Wife states that this afternoon she wanted her  to get some fresh air so she put him in the car to drive to the store. She opened the windows and left him in the car for 10-15 minutes while she went into the store. When she came out he was very short of breath. She carries a pulse ox and she checked and he was sating 80% with a . She noticed his oxygen concentrator was beeping and when she checked the unit it stated it was hot. She thought this was why he was short of breath so she took him home brought him to the basement and placed him on his home bipap. After a few minutes when he didnt look better she rechecked and he was sating in the 60s and hr was in the 160s so she called EMS.   Of note first week of august pt was admitted for chest pain and had stent placement. he was discharged on  with home care nurse and home pt. he has been doing well since then with no further complaints.     CBC, CMP, troponin, probnp, ABG, CXR were obtained. Significant for WBC 15.68, cl 110, glucose 224, troponin .022, probnp 152, ABG 7.3/64/523/32 on bipap fio2 100%. CXR neg for acute pathology   s/p duoneb x 2, solumedrol 125mg IV x 1 .  (03 Sep 2021 21:03)      PAST MEDICAL & SURGICAL HISTORY:  Diabetes Mellitus, Type II    CAD (Coronary Artery Disease)  s/p 3v CABG ; stents placed in [place] in     Dyslipidemia    Osteomyelitis    COPD (chronic obstructive pulmonary disease)  on 2L at home and BiPAP at night; intubated     Hypertension    PVD (peripheral vascular disease)    CABG (Coronary Artery Bypass Graft)      Compound fracture  left leg    S/P primary angioplasty with coronary stent        FAMILY HISTORY:  Family history of diabetes mellitus (Sibling)    Family hx of lung cancer  brother,  age 82, used to smoke with pt          MEDICATIONS   acetaminophen   Tablet .. 650 milliGRAM(s) Oral every 6 hours PRN  albuterol/ipratropium for Nebulization 3 milliLiter(s) Nebulizer every 6 hours PRN  apixaban 5 milliGRAM(s) Oral every 12 hours  aspirin enteric coated 81 milliGRAM(s) Oral daily  atorvastatin 80 milliGRAM(s) Oral at bedtime  azithromycin  IVPB 500 milliGRAM(s) IV Intermittent every 24 hours  budesonide 160 MICROgram(s)/formoterol 4.5 MICROgram(s) Inhaler 2 Puff(s) Inhalation two times a day  clopidogrel Tablet 75 milliGRAM(s) Oral daily  dextrose 40% Gel 15 Gram(s) Oral once  dextrose 5%. 1000 milliLiter(s) IV Continuous <Continuous>  dextrose 5%. 1000 milliLiter(s) IV Continuous <Continuous>  dextrose 50% Injectable 25 Gram(s) IV Push once  dextrose 50% Injectable 12.5 Gram(s) IV Push once  dextrose 50% Injectable 25 Gram(s) IV Push once  glucagon  Injectable 1 milliGRAM(s) IntraMuscular once  influenza   Vaccine 0.5 milliLiter(s) IntraMuscular once  insulin lispro (ADMELOG) corrective regimen sliding scale   SubCutaneous three times a day before meals  insulin lispro (ADMELOG) corrective regimen sliding scale   SubCutaneous at bedtime  losartan 25 milliGRAM(s) Oral daily  methylPREDNISolone sodium succinate Injectable 40 milliGRAM(s) IV Push two times a day  metoprolol tartrate 50 milliGRAM(s) Oral two times a day  tamsulosin 0.4 milliGRAM(s) Oral at bedtime  tiotropium 18 MICROgram(s) Capsule 1 Capsule(s) Inhalation daily      Vital Signs Last 24 Hrs  T(C): 36.6 (05 Sep 2021 04:42), Max: 36.6 (05 Sep 2021 04:42)  T(F): 97.8 (05 Sep 2021 04:42), Max: 97.8 (05 Sep 2021 04:42)  HR: 55 (05 Sep 2021 08:31) (51 - 66)  BP: 119/63 (05 Sep 2021 05:36) (98/62 - 129/71)  BP(mean): --  RR: 18 (05 Sep 2021 04:42) (18 - 18)  SpO2: 98% (05 Sep 2021 08:31) (98% - 100%)      21 @ 07:01  -  21 @ 07:00  --------------------------------------------------------  IN: 0 mL / OUT: 550 mL / NET: -550 mL            LABS:                        11.9   11.97 )-----------( 206      ( 05 Sep 2021 05:53 )             38.0         141  |  105  |  30<H>  ----------------------------<  220<H>  4.5   |  30  |  1.40<H>    Ca    9.4      05 Sep 2021 05:53  Phos  4.5       Mg     2.1         TPro  6.8  /  Alb  3.2<L>  /  TBili  0.3  /  DBili  x   /  AST  27  /  ALT  21  /  AlkPhos  88            ABG - ( 03 Sep 2021 19:06 )  pH, Arterial: 7.30  pH, Blood: x     /  pCO2: 64    /  pO2: 523   / HCO3: 32    / Base Excess: 5.1   /  SaO2: 100.0               WBC:  WBC Count: 11.97 K/uL ( @ 05:53)  WBC Count: 7.83 K/uL ( @ 08:21)  WBC Count: 15.68 K/uL ( @ 19:27)      MICROBIOLOGY:  RECENT CULTURES:   .Blood Blood XXXX XXXX   No growth to date.          CARDIAC MARKERS ( 03 Sep 2021 19:27 )  .022 ng/mL / x     / x     / x     / x                Sodium:  Sodium, Serum: 141 mmol/L ( @ 05:53)  Sodium, Serum: 144 mmol/L ( @ 08:21)  Sodium, Serum: 141 mmol/L ( 19:27)      1.40 mg/dL :53  1.50 mg/dL  @ 08:21  1.30 mg/dL  @ 19:27      Hemoglobin:  Hemoglobin: 11.9 g/dL ( @ 05:53)  Hemoglobin: 11.8 g/dL ( @ 08:21)  Hemoglobin: 13.0 g/dL ( @ 19:27)      Platelets: Platelet Count - Automated: 206 K/uL ( @ 05:53)  Platelet Count - Automated: 184 K/uL ( @ 08:21)  Platelet Count - Automated: 218 K/uL ( @ 19:27)      LIVER FUNCTIONS - ( 04 Sep 2021 08:21 )  Alb: 3.2 g/dL / Pro: 6.8 g/dL / ALK PHOS: 88 U/L / ALT: 21 U/L / AST: 27 U/L / GGT: x                 RADIOLOGY & ADDITIONAL STUDIES:  
Patient is a 81y old  Male who presents with a chief complaint of COPD exacerbation (04 Sep 2021 11:45)    Subjective:  INTERVAL HPI/OVERNIGHT EVENTS: Patient seen and examined at bedside. No overnight events occurred, pt has been on BiPAP and states that he is feeling better and wants to take off the BiPAP. He use it at night time at home, and  use suplemental O2 prn to go outside, walks. Patient has no complaints at this time. Denies chills, chest pain, dyspnea, abdominal pain, GI or urinary symptoms.     MEDICATIONS  (STANDING):  apixaban 5 milliGRAM(s) Oral every 12 hours  aspirin enteric coated 81 milliGRAM(s) Oral daily  atorvastatin 80 milliGRAM(s) Oral at bedtime  azithromycin  IVPB 500 milliGRAM(s) IV Intermittent every 24 hours  budesonide 160 MICROgram(s)/formoterol 4.5 MICROgram(s) Inhaler 2 Puff(s) Inhalation two times a day  clopidogrel Tablet 75 milliGRAM(s) Oral daily  dextrose 40% Gel 15 Gram(s) Oral once  dextrose 5%. 1000 milliLiter(s) (50 mL/Hr) IV Continuous <Continuous>  dextrose 5%. 1000 milliLiter(s) (100 mL/Hr) IV Continuous <Continuous>  dextrose 50% Injectable 25 Gram(s) IV Push once  dextrose 50% Injectable 12.5 Gram(s) IV Push once  dextrose 50% Injectable 25 Gram(s) IV Push once  glucagon  Injectable 1 milliGRAM(s) IntraMuscular once  influenza   Vaccine 0.5 milliLiter(s) IntraMuscular once  insulin lispro (ADMELOG) corrective regimen sliding scale   SubCutaneous three times a day before meals  insulin lispro (ADMELOG) corrective regimen sliding scale   SubCutaneous at bedtime  losartan 25 milliGRAM(s) Oral daily  methylPREDNISolone sodium succinate Injectable 40 milliGRAM(s) IV Push two times a day  metoprolol tartrate 50 milliGRAM(s) Oral two times a day  tamsulosin 0.4 milliGRAM(s) Oral at bedtime  tiotropium 18 MICROgram(s) Capsule 1 Capsule(s) Inhalation daily    MEDICATIONS  (PRN):  acetaminophen   Tablet .. 650 milliGRAM(s) Oral every 6 hours PRN Temp greater or equal to 38C (100.4F), Mild Pain (1 - 3)  albuterol/ipratropium for Nebulization 3 milliLiter(s) Nebulizer every 6 hours PRN Shortness of Breath and/or Wheezing      Allergies  No Known Allergies    Intolerances  shellfish (Nausea)      REVIEW OF SYSTEMS:  CONSTITUTIONAL: No fever or chills  HEENT:  No headache, no sore throat  RESPIRATORY: No cough, wheezing, or shortness of breath  CARDIOVASCULAR: No chest pain, palpitations  GASTROINTESTINAL: No abd pain, nausea, vomiting, or diarrhea  GENITOURINARY: No dysuria, frequency, or hematuria  NEUROLOGICAL: no focal weakness or dizziness  MUSCULOSKELETAL: no myalgias     Objective:  Vital Signs Last 24 Hrs  T(C): 36.5 (04 Sep 2021 12:41), Max: 36.8 (03 Sep 2021 22:52)  T(F): 97.7 (04 Sep 2021 12:41), Max: 98.3 (03 Sep 2021 22:52)  HR: 65 (04 Sep 2021 12:41) (65 - 116)  BP: 103/62 (04 Sep 2021 12:41) (103/62 - 126/69)  RR: 18 (04 Sep 2021 12:41) (18 - 24)  SpO2: 98% (04 Sep 2021 12:41) (98% - 100%)    GENERAL: NAD, lying in bed comfortably  HEAD:  Atraumatic, Normocephalic  EYES: EOMI, PERRLA, conjunctiva and sclera clear  ENT: Moist mucous membranes  NECK: Supple, No JVD  CHEST/LUNG: Clear to auscultation bilaterally; No rales, rhonchi, wheezing, or rubs. Unlabored respirations  HEART: Regular rate and rhythm; No murmurs, rubs, or gallops  ABDOMEN: Bowel sounds present; Soft, Nontender, Nondistended. No hepatomegaly  EXTREMITIES:  2+ Peripheral Pulses, brisk capillary refill. No clubbing, cyanosis, or edema  NERVOUS SYSTEM:  Alert & Oriented X3, speech clear. No deficits   MSK: FROM all 4 extremities, full and equal strength  SKIN: No rashes or lesions    LABS:                        11.8   7.83  )-----------( 184      ( 04 Sep 2021 08:21 )             39.5     CBC Full  -  ( 04 Sep 2021 08:21 )  WBC Count : 7.83 K/uL  Hemoglobin : 11.8 g/dL  Hematocrit : 39.5 %  Platelet Count - Automated : 184 K/uL  Mean Cell Volume : 90.0 fl  Mean Cell Hemoglobin : 26.9 pg  Mean Cell Hemoglobin Concentration : 29.9 gm/dL  Auto Neutrophil # : x  Auto Lymphocyte # : x  Auto Monocyte # : x  Auto Eosinophil # : x  Auto Basophil # : x  Auto Neutrophil % : x  Auto Lymphocyte % : x  Auto Monocyte % : x  Auto Eosinophil % : x  Auto Basophil % : x    04 Sep 2021 08:21    144    |  109    |  25     ----------------------------<  267    5.2     |  27     |  1.50     Ca    9.0        04 Sep 2021 08:21  Phos  4.5       04 Sep 2021 08:21  Mg     2.1       04 Sep 2021 08:21    TPro  6.8    /  Alb  3.2    /  TBili  0.3    /  DBili  x      /  AST  27     /  ALT  21     /  AlkPhos  88     04 Sep 2021 08:21        CAPILLARY BLOOD GLUCOSE    POCT Blood Glucose.: 240 mg/dL (04 Sep 2021 11:57)  POCT Blood Glucose.: 289 mg/dL (04 Sep 2021 07:49)  POCT Blood Glucose.: 327 mg/dL (03 Sep 2021 22:46)      RADIOLOGY & ADDITIONAL TESTS:    Personally reviewed.     Consultant(s) Notes Reviewed:  [x] YES  [ ] NO

## 2021-09-06 NOTE — DISCHARGE NOTE PROVIDER - NSDCFUSCHEDAPPT_GEN_ALL_CORE_FT
YUE COTTO ; 09/27/2021 ; NPP [facility] [address] ; 09/27/2021 ; NP [address] [facility] [address] ; 09/27/2021 ; [facility] [address] ; 10/05/2021 ; [facility] [address]

## 2021-09-06 NOTE — DISCHARGE NOTE PROVIDER - HOSPITAL COURSE
ADMISSION DATE:  09-03-21    ---  FROM ADMISSION H+P:   HPI:  80 yo m pmh CAD s/p stent, HLD, HTN, PVD, COPD on prn home o2 and nocturnal bipap, type 2 Dm presents to ED with shortness of breath. History obtained from wife at bedside . Wife states that this afternoon she wanted her  to get some fresh air so she put him in the car to drive to the store. She opened the windows and left him in the car for 10-15 minutes while she went into the store. When she came out he was very short of breath. She carries a pulse ox and she checked and he was sating 80% with a . She noticed his oxygen concentrator was beeping and when she checked the unit it stated it was hot. She thought this was why he was short of breath so she took him home brought him to the basement and placed him on his home bipap. After a few minutes when he didnt look better she rechecked and he was sating in the 60s and hr was in the 160s so she called EMS.   Of note first week of august pt was admitted for chest pain and had stent placement. he was discharged on 8/7 with home care nurse and home pt. he has been doing well since then with no further complaints.     CBC, CMP, troponin, probnp, ABG, CXR were obtained. Significant for WBC 15.68, cl 110, glucose 224, troponin .022, probnp 152, ABG 7.3/64/523/32 on bipap fio2 100%. CXR neg for acute pathology   s/p duoneb x 2, solumedrol 125mg IV x 1 .  (03 Sep 2021 21:03)      ---  HOSPITAL COURSE/PERTINENT LABS/PROCEDURES PERFORMED/PENDING TESTS:  Patient admitted with acute respiratory hypoxic and hypercapnic failure 2/2 COPD exacerbation. Patient started on IV steroids and given 3 day course of IV azithromycin. Patient was continued on his home meds and nocturnal BiPAP. Patient's condition improved. Patient was titrated to off O2.     Patient seen and examined at bedside on day of discharge. States he feels well, eager to go home, refusing home care services. Discussed with wife at bedside, agreeable with D/C plan. Will taper steroids to home dose.     ---  PATIENT CONDITION:  - stable    ---  PHYSICAL EXAM ON DAY OF DISCHARGE:  Vital Signs Last 24 Hrs  T(C): 36.4 (06 Sep 2021 12:34), Max: 37 (05 Sep 2021 18:58)  T(F): 97.6 (06 Sep 2021 12:34), Max: 98.6 (05 Sep 2021 18:58)  HR: 69 (06 Sep 2021 12:34) (59 - 87)  BP: 121/66 (06 Sep 2021 12:34) (102/63 - 121/66)  BP(mean): --  RR: 18 (06 Sep 2021 12:34) (18 - 20)  SpO2: 95% (06 Sep 2021 12:34) (91% - 100%)    ---  CONSULTANTS:   Dr. Dejesus ([facility])    ---  ADVANCED CARE PLANNING:  - Code status:    full  - MOLST completed:      [ x ] NO     [  ] YES    ---  TIME SPENT:  I, the attending physician, was physically present for the key portions of the evaluation and management (E/M) service provided. The total amount of time spent reviewing the hospital notes, laboratory values, imaging findings, assessing/counseling the patient, discussing with consultant physicians, social work, nursing staff was 44 minutes

## 2021-09-06 NOTE — DISCHARGE NOTE NURSING/CASE MANAGEMENT/SOCIAL WORK - NSDPLANG ASIS_GEN_ALL_CORE
Left detailed message for Jim Whitley at [facility] that pt needs to schedule a Pre-Op visit with one of the physicians because our APN's do not do Pre-Op visits.
having surgery 7/19    can Melly's notes be appended to become her H&P?    please advise
No

## 2021-09-27 ENCOUNTER — APPOINTMENT (OUTPATIENT)
Dept: VASCULAR SURGERY | Facility: CLINIC | Age: 81
End: 2021-09-27

## 2021-10-01 NOTE — H&P ADULT - PROBLEM SELECTOR PROBLEM 2
Nexplanon Insertion:    Procedures    Pre Dx: 1) Nexplanon Insertion   Post Dx: 1) Nexplanon Insertion     Risks, benefits, alternatives explained. All questions answered. Consents signed.  Patient placed on examined table in supine position with her Left arm flexed at the elbow and hand resting under her head.  The area for insertion prepped with betadine in a sterile fashion.      The insertion site was identified approximately 8-10 cm proximal to the humoral epicondyle and 3-4 cm below with sulcus of the triceps and biceps muscle. The skin at the insertion site was stretched by my thumb and index finger. The insertion site was anesthetized about 3 mL of 1% lidocaine. Nex, the needle tip was inserted, bevel side up, at an angle not greater than 30° to the skin surface, just until the skin was penetrated to below the bevel. The applicator was then lowered so that it was parallel to the arm. To minimize the chance of deep incision, the skin was tented upwards with the tip of the needle. The needle was then gently inserted to the full length and the device was fixed in place. I then slowly and carefully retracted the needle back by retracting the release lever. The obturator was seen in the device to determine that the nexplanon had been released.     Both the patient and I were easily able to feel the device under the skin. Steri-Strips were applied at the site, and the arm was gently wrapped with gauze.    There were no complications.   The patient tolerated the procedure well.    She was given a reminder card. She was advised to use condoms as a backup method for at least a week after insertion.     She understands that the device terms in three years.     Expectations, warning signs and limitations reviewed.     [facility] DEVICE  NDC # 4620-1707-58  LOT # [id number]  EXP: 01/14/2024    Dylon Gill MD  10/01/2021    
Hypertension

## 2021-10-05 ENCOUNTER — APPOINTMENT (OUTPATIENT)
Dept: PULMONOLOGY | Facility: CLINIC | Age: 81
End: 2021-10-05
Payer: MEDICARE

## 2021-10-05 VITALS
HEIGHT: 70 IN | WEIGHT: 215 LBS | RESPIRATION RATE: 16 BRPM | OXYGEN SATURATION: 100 % | SYSTOLIC BLOOD PRESSURE: 128 MMHG | DIASTOLIC BLOOD PRESSURE: 87 MMHG | HEART RATE: 81 BPM | TEMPERATURE: 98 F | BODY MASS INDEX: 30.78 KG/M2

## 2021-10-05 PROCEDURE — 99214 OFFICE O/P EST MOD 30 MIN: CPT

## 2021-10-05 RX ORDER — AZITHROMYCIN 250 MG/1
250 TABLET, FILM COATED ORAL DAILY
Qty: 36 | Refills: 3 | Status: DISCONTINUED | COMMUNITY
Start: 2020-08-25 | End: 2021-10-05

## 2021-10-05 NOTE — HISTORY OF PRESENT ILLNESS
[FreeTextEntry1] : Mr. 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 He was recently hospitalized at  hospital in August 2021 for new-onset A-Fib with RVR, found to have positive stress test s/p cath with PCI to LAD. He was discharged with aspirin and Eliquis, and his metoprolol was increased to 50 mg twice daily. His azithromycin was held. He was discharged with home PT. He was also recently hospitalized at  in September for COPD exacerbation with acute on chronic hypercapnia that improved with steroids, nebs, and BiPAP.\par \par He was previously hospitalized at  in May 2021 with asthma/COPD exacerbation with acute on chronic hypercapnic respiratory failure requiring BiPAP. Cause was attributed to change of season and exposure to flowers at home. He improved with BiPAP, steroids, nebulizers, and levofloxacin. \par \par Currently, he reports doing well. His wife administers Mepolizumab every month. He is currently on prednisone 5 mg daily. He is adherent with Breo Ellipta 200-25 mcg and Incruse Ellipta. His montelukast was held briefly last visit given his EDS, but there was no change. He recently restarted azithromycin. He is adherent with BiPAP (IPAP 18, EPAP 8) every night from 11pm until 9am. He also wears oxygen during the day occasionally with walking, but wife reports that saturation is up to 95% with going up a flight of stairs. He recently completed home PT after recent hospitalization.\par \par He has not went for pulmonary rehab. Declining physical therapy at home or pulmonary rehab at this time. Has weights, stationary bike, treadmill, and pool at home, and reports that he will start using. He goes to sleep at around 11pm-12am, wakes up at 9am, then naps from 1pm to 2-3 pm. \par \par Note, recent ABG in May 2021 with pH 7.16, pCO2 86, pO2 347, SaO2 99%. Repeat ABG after BiPAP improved to pH 7.40, pCO2 48, pO2 89, SaO2 97%.\par \par Last 2D-echo in January 2020 with mild diastolic dysfunction, normal LV systolic function, no significant valvular disease, and no pulmonary hypertension (estimated RVSP is 15). \par \par Pulmonary rehab currently on pause due to COVID pandemic, but he is willing to resume. \par \par PFTs (Dec 2020) with severe obstruction. TLC is normal, but RV is increased to 165%. Diffusion capacity is moderately reduced.\par \par Exercise oximetry (Dec 2020) normal oxygen saturation at rest, but developed hypoxemia to 85% while walking at 1.1 MPH requiring 2L NC. \par \par 6MWD (Dec 2020) is 132 meters.\par \par Last CT chest in June 2020 with interval improvement and near resolution of LLL pneumonia with minimal residual atelectasis. There is new linear atelectasis in the RLL. Stable emphysema.\par \par Regarding his smoking history, he quit smoking in the 1980s, used to smoke 1 ppd for 30 years. Was smoking marijuana about 3-4 joints 3x/week until 2017. No alcohol use. No other drug use

## 2021-10-05 NOTE — ASSESSMENT
[FreeTextEntry1] : Mr. 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 He is clinically stable, but is not exercising much and is relatively sedentary. Now with improved daytime somnolence. He was recently hospitalized in May & September 2021 for asthma-COPD exacerbations and in August for new-onset A-Fib with RVR and positive stress test s/p PCI to LAD. Currently feels well.\par \par 1. 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 There is moderately reduced diffusing capacity. Needs repeat PFTs\par - ABG in May 2021 during hospitalization initially 7.16/86, improved to 7.40/48 with BiPAP. Most recent ABG during exacerbation in Sept 2021 with pH 7.3, pCO2 64\par - Resolved eosinophilia with mepolizumab and prednisone. IgE previously elevated to 118 in Dec 2019 with allergy testing positive for house dust. Aspergillus IgE negative. Alpha-1 antitrypsin normal and HIV negative\par - Given bronchial hyperreactivity and peripheral eosinophilia, I suspect Asthma-COPD Overlap Syndrome\par - Continue monthly Mepolizumab injections\par - Prednisone 5 mg daily\par - Continue with Breo Ellipta 200-25 mcg once daily (rinse after use) + Incruse Ellipta 62.5 mcg daily\par - Montelukast 10 mg qhs\par - Restart azithromycin 250 mg MWF (QTc during recent hospitalization 454 msec)\par - Ventolin prn with spacer\par - Restart pulmonary rehab currently\par - We discussed the importance of regular exercise. He will try to start using his stationary bike and treadmill at home as well as take walks with his wife. Wants to resume swimming in the spring\par - Reconsider BLVR if continues to experience exacerbations despite increasing exercise\par \par 2. Chronic hypoxemia and hypercapnic respiratory failure:\par - Likely due to asthma/COPD\par - Continue supplemental oxygen with nasal cannula 2-3 LPM with exercise, goal SpO2>88%\par - BiPAP every night, IPAP 18, EPAP 8\par - 6MWD (Dec 2020) is 148 meters using cane and 2L NC. EOT (Dec 2020) required 2L NC. Consider repeat soon\par \par 3. Abnormal CT Chest\par - CT Chest in June 2020 with interval predominant resolution of the previously seen LLL consolidation with minimal residual atelectasis. New RLL area of linear atelectasis\par - Hold off on further imaging at this time\par \par 4. Bilateral leg edema:\par - Improved with low salt diet\par - 2D-echo in August 2021 with mild segmental LV systolic dysfunction with hypokinesis of the apical inferior, apical septum, apical lateral, basal inferior, mid anterior, and mid inferior walls. Also with mild diastolic dysfunction\par - Continue cardiac meds, including apixaban, clopidogrel, rosuvastatin, metoprolol, and losartan\par - Continue low salt diet, keep legs elevated and compression stockings\par - No evidence of pulmonary hypertension on recent 2D-echo\par \par 5. Upper airway cough syndrome:\par - Continue fluticasone nasal spray, 1-2 sprays per nostril twice daily\par \par 6. HCM:\par - Up-to-date with influenza, pneumococcal, and COVID vaccinations\par - Self-injecting Mepolizumab at home\par \par 7. Follow-up in 2 months or sooner prn, needs PFTs

## 2021-10-05 NOTE — PHYSICAL EXAM
[General Appearance - Well Developed] : well developed [Normal Appearance] : normal appearance [General Appearance - Well Nourished] : well nourished [Well Groomed] : well groomed [General Appearance - In No Acute Distress] : no acute distress [Normal Conjunctiva] : the conjunctiva exhibited no abnormalities [Normal Oropharynx] : normal oropharynx [Neck Appearance] : the appearance of the neck was normal [Neck Cervical Mass (___cm)] : no neck mass was observed [Jugular Venous Distention Increased] : there was no jugular-venous distention [Heart Sounds] : normal S1 and S2 [Heart Rate And Rhythm] : heart rate and rhythm were normal [Murmurs] : no murmurs present [Arterial Pulses Normal] : the arterial pulses were normal [Respiration, Rhythm And Depth] : normal respiratory rhythm and effort [Exaggerated Use Of Accessory Muscles For Inspiration] : no accessory muscle use [Abdomen Soft] : soft [Abdomen Tenderness] : non-tender [Abnormal Walk] : normal gait [Nail Clubbing] : no clubbing of the fingernails [Cyanosis, Localized] : no localized cyanosis [Cranial Nerves] : cranial nerves 2-12 were intact [Motor Exam] : the motor exam was normal [Oriented To Time, Place, And Person] : oriented to person, place, and time [Affect] : the affect was normal [Mood] : the mood was normal [Skin Color & Pigmentation] : normal skin color and pigmentation [] : no rash [Skin Lesions] : no skin lesions [FreeTextEntry1] : decreased air entry bilaterally; no wheezing

## 2021-10-13 ENCOUNTER — APPOINTMENT (OUTPATIENT)
Dept: UROLOGY | Facility: CLINIC | Age: 81
End: 2021-10-13
Payer: MEDICARE

## 2021-10-13 VITALS
TEMPERATURE: 97.4 F | RESPIRATION RATE: 14 BRPM | SYSTOLIC BLOOD PRESSURE: 108 MMHG | HEART RATE: 95 BPM | BODY MASS INDEX: 30.78 KG/M2 | HEIGHT: 70 IN | WEIGHT: 215 LBS | OXYGEN SATURATION: 97 % | DIASTOLIC BLOOD PRESSURE: 69 MMHG

## 2021-10-13 DIAGNOSIS — E11.9 TYPE 2 DIABETES MELLITUS W/OUT COMPLICATIONS: ICD-10-CM

## 2021-10-13 DIAGNOSIS — R32 UNSPECIFIED URINARY INCONTINENCE: ICD-10-CM

## 2021-10-13 DIAGNOSIS — R35.0 FREQUENCY OF MICTURITION: ICD-10-CM

## 2021-10-13 PROCEDURE — 99204 OFFICE O/P NEW MOD 45 MIN: CPT | Mod: 25

## 2021-10-13 PROCEDURE — 51798 US URINE CAPACITY MEASURE: CPT

## 2021-10-13 NOTE — REVIEW OF SYSTEMS
[Shortness Of Breath] : shortness of breath [Difficulty Walking] : difficulty walking [Negative] : Heme/Lymph [see HPI] : see HPI

## 2021-10-13 NOTE — ASSESSMENT
[FreeTextEntry1] : patiwnt with DM \par c/o frquency and dribbing \par office exam signif for PVR and BPH \par on flomax \par discussed PVR may be related to BPH or DM\par will check aniya and bladder us \par frequency may be related to fluids or UTI or DM \par will check urine

## 2021-10-13 NOTE — HISTORY OF PRESENT ILLNESS
[FreeTextEntry1] : patient on O2 at visit\par c/o frequency to void day and night with good flow and urge and occas urge inc - dribblig of urine\par feels empty after void \par admits to a lot of water intake \par no dysira or hematurai \par labd result reviewed showed sept 21: creat 1.4 and u cx - negative \par aug 2021 with urine : ++ glucose \par hx of DM

## 2021-10-13 NOTE — PHYSICAL EXAM
[General Appearance - Well Developed] : well developed [General Appearance - Well Nourished] : well nourished [Normal Appearance] : normal appearance [Well Groomed] : well groomed [General Appearance - In No Acute Distress] : no acute distress [Edema] : no peripheral edema [Respiration, Rhythm And Depth] : normal respiratory rhythm and effort [Exaggerated Use Of Accessory Muscles For Inspiration] : no accessory muscle use [Abdomen Soft] : soft [Abdomen Tenderness] : non-tender [Abdomen Mass (___ Cm)] : no abdominal mass palpated [Abdomen Hernia] : no hernia was discovered [Costovertebral Angle Tenderness] : no ~M costovertebral angle tenderness [FreeTextEntry1] : pvr after 2 voids : 106 ml [Rectal Exam - Rectum] : digital rectal exam was normal [Prostate Tenderness] : the prostate was not tender [No Prostate Nodules] : no prostate nodules [Prostate Size ___ gm] : prostate size [unfilled] gm [Normal Station and Gait] : the gait and station were normal for the patient's age [] : no rash [No Focal Deficits] : no focal deficits [Oriented To Time, Place, And Person] : oriented to person, place, and time [Affect] : the affect was normal [Mood] : the mood was normal [Not Anxious] : not anxious [Cervical Lymph Nodes Enlarged Posterior Bilaterally] : posterior cervical [Cervical Lymph Nodes Enlarged Anterior Bilaterally] : anterior cervical [Supraclavicular Lymph Nodes Enlarged Bilaterally] : supraclavicular

## 2021-10-15 LAB
APPEARANCE: CLEAR
BACTERIA UR CULT: NORMAL
BACTERIA: NEGATIVE
BILIRUBIN URINE: NEGATIVE
BLOOD URINE: NEGATIVE
COLOR: NORMAL
GLUCOSE QUALITATIVE U: ABNORMAL
HYALINE CASTS: 0 /LPF
KETONES URINE: NEGATIVE
LEUKOCYTE ESTERASE URINE: NEGATIVE
MICROSCOPIC-UA: NORMAL
NITRITE URINE: NEGATIVE
PH URINE: 5.5
PROTEIN URINE: NEGATIVE
RED BLOOD CELLS URINE: 0 /HPF
SPECIFIC GRAVITY URINE: 1.03
SQUAMOUS EPITHELIAL CELLS: 1 /HPF
UROBILINOGEN URINE: NORMAL
WHITE BLOOD CELLS URINE: 0 /HPF

## 2021-10-18 ENCOUNTER — APPOINTMENT (OUTPATIENT)
Dept: VASCULAR SURGERY | Facility: CLINIC | Age: 81
End: 2021-10-18
Payer: MEDICARE

## 2021-10-18 VITALS
HEART RATE: 96 BPM | HEIGHT: 70 IN | DIASTOLIC BLOOD PRESSURE: 70 MMHG | BODY MASS INDEX: 30.78 KG/M2 | SYSTOLIC BLOOD PRESSURE: 108 MMHG | WEIGHT: 215 LBS

## 2021-10-18 PROCEDURE — 93978 VASCULAR STUDY: CPT

## 2021-10-18 PROCEDURE — 93925 LOWER EXTREMITY STUDY: CPT

## 2021-10-21 NOTE — ED ADULT NURSE NOTE - BREATH SOUNDS, LEFT
diminished Consent (Lip)/Introductory Paragraph: The rationale for Mohs was explained to the patient and consent was obtained. The risks, benefits and alternatives to therapy were discussed in detail. Specifically, the risks of lip deformity, changes in the oral aperture, infection, scarring, bleeding, prolonged wound healing, incomplete removal, allergy to anesthesia, nerve injury and recurrence were addressed. Prior to the procedure, the treatment site was clearly identified and confirmed by the patient. All components of Universal Protocol/PAUSE Rule completed.

## 2021-10-26 NOTE — ED ADULT NURSE NOTE - NS ED NURSE LEVEL OF CONSCIOUSNESS AFFECT
Justo Gomez  [id number]  October 26, 2021    Chief Complaint   Patient presents with    Post-Op Check     TOTAL KNEE REPLACEMENT LEFT KNEE ROBOTIC ASSISTED; DOS 10/11/21. History: The patient is now approximately 2 weeks status post left total knee arthroplasty. The patient rates her pain as 7/10. She denies any numbness or tingling. She reports no issues with her anesthesia. She currently is staying at her mother's house. She is participating in home therapy. The patient's  past medical history, medications, allergies,  family history, social history, and review of systems have been reviewed, and dated and are recorded in the chart. Ht 5' 7\" (1.702 m)   Wt 292 lb (132.5 kg)   BMI 45.73 kg/m²     Physical: Ms. Justo Gomez appears well, she is in no apparent distress, she demonstrates appropriate mood & affect. She is alert and oriented to person, place and time. She has mild swelling. There is No evidence of DVT seen on physical exam. She is neurovascularly intact distally. Range of motion is from -3 degrees to 90 degrees. The incision is  clean, dry and intact and without erythema. Strength in the knee is 3+/5. There is no instability with varus and valgus stressing of the knee. There is no pain with range of motion of the hips. Xrays: Three views of the left knee were obtained and reviewed. The prosthesis is well aligned and there is no evidence of loosening. Impression: Status post left Total Knee Arthroplasty, Doing well postoperatively. Plan:  She will continue to work aggressively on range of motion and strengthening: Natural history and expected course discussed. Questions answered. Quad strengthening exercises. The patient will gradually transition to an outpatient physical therapy program. The patient will follow up with me in 4 weeks. The patient was given a refill on the oxycodone.
Appropriate/Calm

## 2021-10-28 ENCOUNTER — OUTPATIENT (OUTPATIENT)
Dept: OUTPATIENT SERVICES | Facility: HOSPITAL | Age: 81
LOS: 1 days | End: 2021-10-28
Payer: COMMERCIAL

## 2021-10-28 ENCOUNTER — APPOINTMENT (OUTPATIENT)
Dept: ULTRASOUND IMAGING | Facility: CLINIC | Age: 81
End: 2021-10-28
Payer: MEDICARE

## 2021-10-28 DIAGNOSIS — T14.8XXA OTHER INJURY OF UNSPECIFIED BODY REGION, INITIAL ENCOUNTER: Chronic | ICD-10-CM

## 2021-10-28 DIAGNOSIS — Z95.5 PRESENCE OF CORONARY ANGIOPLASTY IMPLANT AND GRAFT: Chronic | ICD-10-CM

## 2021-10-28 DIAGNOSIS — E11.9 TYPE 2 DIABETES MELLITUS WITHOUT COMPLICATIONS: ICD-10-CM

## 2021-10-28 PROCEDURE — 76770 US EXAM ABDO BACK WALL COMP: CPT | Mod: 26

## 2021-10-28 PROCEDURE — 76770 US EXAM ABDO BACK WALL COMP: CPT

## 2021-11-18 NOTE — ED ADULT TRIAGE NOTE - WEIGHT IN KG
Low Back Pain: Exercises  Introduction  Here are some examples of exercises for you to try. The exercises may be suggested for a condition or for rehabilitation. Start each exercise slowly. Ease off the exercises if you start to have pain. You will be told when to start these exercises and which ones will work best for you. How to do the exercises  Press-up    1. Lie on your stomach, supporting your body with your forearms. 2. Press your elbows down into the floor to raise your upper back. As you do this, relax your stomach muscles and allow your back to arch without using your back muscles. As your press up, do not let your hips or pelvis come off the floor. 3. Hold for 15 to 30 seconds, then relax. 4. Repeat 2 to 4 times. Alternate arm and leg (bird dog) exercise    Do this exercise slowly. Try to keep your body straight at all times, and do not let one hip drop lower than the other. 1. Start on the floor, on your hands and knees. 2. Tighten your belly muscles. 3. Raise one leg off the floor, and hold it straight out behind you. Be careful not to let your hip drop down, because that will twist your trunk. 4. Hold for about 6 seconds, then lower your leg and switch to the other leg. 5. Repeat 8 to 12 times on each leg. 6. Over time, work up to holding for 10 to 30 seconds each time. 7. If you feel stable and secure with your leg raised, try raising the opposite arm straight out in front of you at the same time. Knee-to-chest exercise    1. Lie on your back with your knees bent and your feet flat on the floor. 2. Bring one knee to your chest, keeping the other foot flat on the floor (or keeping the other leg straight, whichever feels better on your lower back). 3. Keep your lower back pressed to the floor. Hold for at least 15 to 30 seconds. 4. Relax, and lower the knee to the starting position. 5. Repeat with the other leg. Repeat 2 to 4 times with each leg.   6. To get more stretch, put your other leg flat on the floor while pulling your knee to your chest.  Curl-ups    1. Lie on the floor on your back with your knees bent at a 90-degree angle. Your feet should be flat on the floor, about 12 inches from your buttocks. 2. Cross your arms over your chest. If this bothers your neck, try putting your hands behind your neck (not your head), with your elbows spread apart. 3. Slowly tighten your belly muscles and raise your shoulder blades off the floor. 4. Keep your head in line with your body, and do not press your chin to your chest.  5. Hold this position for 1 or 2 seconds, then slowly lower yourself back down to the floor. 6. Repeat 8 to 12 times. Pelvic tilt exercise    1. Lie on your back with your knees bent. 2. \"Brace\" your stomach. This means to tighten your muscles by pulling in and imagining your belly button moving toward your spine. You should feel like your back is pressing to the floor and your hips and pelvis are rocking back. 3. Hold for about 6 seconds while you breathe smoothly. 4. Repeat 8 to 12 times. Heel dig bridging    1. Lie on your back with both knees bent and your ankles bent so that only your heels are digging into the floor. Your knees should be bent about 90 degrees. 2. Then push your heels into the floor, squeeze your buttocks, and lift your hips off the floor until your shoulders, hips, and knees are all in a straight line. 3. Hold for about 6 seconds as you continue to breathe normally, and then slowly lower your hips back down to the floor and rest for up to 10 seconds. 4. Do 8 to 12 repetitions. Hamstring stretch in doorway    1. Lie on your back in a doorway, with one leg through the open door. 2. Slide your leg up the wall to straighten your knee. You should feel a gentle stretch down the back of your leg. 3. Hold the stretch for at least 15 to 30 seconds. Do not arch your back, point your toes, or bend either knee.  Keep one heel touching the floor and the other heel touching the wall. 4. Repeat with your other leg. 5. Do 2 to 4 times for each leg. Hip flexor stretch    1. Kneel on the floor with one knee bent and one leg behind you. Place your forward knee over your foot. Keep your other knee touching the floor. 2. Slowly push your hips forward until you feel a stretch in the upper thigh of your rear leg. 3. Hold the stretch for at least 15 to 30 seconds. Repeat with your other leg. 4. Do 2 to 4 times on each side. Wall sit    1. Stand with your back 10 to 12 inches away from a wall. 2. Lean into the wall until your back is flat against it. 3. Slowly slide down until your knees are slightly bent, pressing your lower back into the wall. 4. Hold for about 6 seconds, then slide back up the wall. 5. Repeat 8 to 12 times. Follow-up care is a key part of your treatment and safety. Be sure to make and go to all appointments, and call your doctor if you are having problems. It's also a good idea to know your test results and keep a list of the medicines you take. Where can you learn more? Go to http://www.gray.com/  Enter Q446 in the search box to learn more about \"Low Back Pain: Exercises. \"  Current as of: July 1, 2021               Content Version: 13.0  © 2006-2021 [employer]. Care instructions adapted under license by [employer] (which disclaims liability or warranty for this information). If you have questions about a medical condition or this instruction, always ask your healthcare professional. [place] [address] any warranty or liability for your use of this information. 93.9

## 2021-12-13 ENCOUNTER — APPOINTMENT (OUTPATIENT)
Dept: VASCULAR SURGERY | Facility: CLINIC | Age: 81
End: 2021-12-13
Payer: MEDICARE

## 2021-12-13 VITALS
SYSTOLIC BLOOD PRESSURE: 132 MMHG | DIASTOLIC BLOOD PRESSURE: 74 MMHG | HEART RATE: 103 BPM | BODY MASS INDEX: 30.78 KG/M2 | HEIGHT: 70 IN | WEIGHT: 215 LBS

## 2021-12-13 PROCEDURE — 93923 UPR/LXTR ART STDY 3+ LVLS: CPT

## 2021-12-17 ENCOUNTER — INPATIENT (INPATIENT)
Facility: HOSPITAL | Age: 81
LOS: 12 days | Discharge: HOME | DRG: 638 | End: 2021-12-30
Attending: HOSPITALIST | Admitting: STUDENT IN AN ORGANIZED HEALTH CARE EDUCATION/TRAINING PROGRAM
Payer: COMMERCIAL

## 2021-12-17 VITALS
HEIGHT: 71 IN | TEMPERATURE: 98 F | WEIGHT: 214.95 LBS | DIASTOLIC BLOOD PRESSURE: 80 MMHG | HEART RATE: 100 BPM | SYSTOLIC BLOOD PRESSURE: 120 MMHG | RESPIRATION RATE: 18 BRPM | OXYGEN SATURATION: 97 %

## 2021-12-17 DIAGNOSIS — T14.8XXA OTHER INJURY OF UNSPECIFIED BODY REGION, INITIAL ENCOUNTER: Chronic | ICD-10-CM

## 2021-12-17 DIAGNOSIS — Z95.5 PRESENCE OF CORONARY ANGIOPLASTY IMPLANT AND GRAFT: Chronic | ICD-10-CM

## 2021-12-17 LAB
ALBUMIN SERPL ELPH-MCNC: 2.9 G/DL — LOW (ref 3.3–5)
ALP SERPL-CCNC: 102 U/L — SIGNIFICANT CHANGE UP (ref 40–120)
ALT FLD-CCNC: 13 U/L — SIGNIFICANT CHANGE UP (ref 12–78)
ANION GAP SERPL CALC-SCNC: 7 MMOL/L — SIGNIFICANT CHANGE UP (ref 5–17)
APTT BLD: 45.4 SEC — HIGH (ref 27.5–35.5)
AST SERPL-CCNC: 15 U/L — SIGNIFICANT CHANGE UP (ref 15–37)
BASOPHILS # BLD AUTO: 0.02 K/UL — SIGNIFICANT CHANGE UP (ref 0–0.2)
BASOPHILS NFR BLD AUTO: 0.2 % — SIGNIFICANT CHANGE UP (ref 0–2)
BILIRUB SERPL-MCNC: 0.2 MG/DL — SIGNIFICANT CHANGE UP (ref 0.2–1.2)
BUN SERPL-MCNC: 26 MG/DL — HIGH (ref 7–23)
CALCIUM SERPL-MCNC: 10.2 MG/DL — HIGH (ref 8.5–10.1)
CHLORIDE SERPL-SCNC: 103 MMOL/L — SIGNIFICANT CHANGE UP (ref 96–108)
CO2 SERPL-SCNC: 28 MMOL/L — SIGNIFICANT CHANGE UP (ref 22–31)
CREAT SERPL-MCNC: 1.6 MG/DL — HIGH (ref 0.5–1.3)
EOSINOPHIL # BLD AUTO: 0 K/UL — SIGNIFICANT CHANGE UP (ref 0–0.5)
EOSINOPHIL NFR BLD AUTO: 0 % — SIGNIFICANT CHANGE UP (ref 0–6)
GLUCOSE SERPL-MCNC: 162 MG/DL — HIGH (ref 70–99)
HCT VFR BLD CALC: 41.1 % — SIGNIFICANT CHANGE UP (ref 39–50)
HGB BLD-MCNC: 12.8 G/DL — LOW (ref 13–17)
IMM GRANULOCYTES NFR BLD AUTO: 0.6 % — SIGNIFICANT CHANGE UP (ref 0–1.5)
INR BLD: 1.28 RATIO — HIGH (ref 0.88–1.16)
LACTATE SERPL-SCNC: 1.9 MMOL/L — SIGNIFICANT CHANGE UP (ref 0.7–2)
LYMPHOCYTES # BLD AUTO: 1.01 K/UL — SIGNIFICANT CHANGE UP (ref 1–3.3)
LYMPHOCYTES # BLD AUTO: 11.3 % — LOW (ref 13–44)
MCHC RBC-ENTMCNC: 27.1 PG — SIGNIFICANT CHANGE UP (ref 27–34)
MCHC RBC-ENTMCNC: 31.1 GM/DL — LOW (ref 32–36)
MCV RBC AUTO: 86.9 FL — SIGNIFICANT CHANGE UP (ref 80–100)
MONOCYTES # BLD AUTO: 0.74 K/UL — SIGNIFICANT CHANGE UP (ref 0–0.9)
MONOCYTES NFR BLD AUTO: 8.3 % — SIGNIFICANT CHANGE UP (ref 2–14)
NEUTROPHILS # BLD AUTO: 7.13 K/UL — SIGNIFICANT CHANGE UP (ref 1.8–7.4)
NEUTROPHILS NFR BLD AUTO: 79.6 % — HIGH (ref 43–77)
NRBC # BLD: 0 /100 WBCS — SIGNIFICANT CHANGE UP (ref 0–0)
PLATELET # BLD AUTO: 352 K/UL — SIGNIFICANT CHANGE UP (ref 150–400)
POTASSIUM SERPL-MCNC: 5 MMOL/L — SIGNIFICANT CHANGE UP (ref 3.5–5.3)
POTASSIUM SERPL-SCNC: 5 MMOL/L — SIGNIFICANT CHANGE UP (ref 3.5–5.3)
PROT SERPL-MCNC: 7.7 G/DL — SIGNIFICANT CHANGE UP (ref 6–8.3)
PROTHROM AB SERPL-ACNC: 14.8 SEC — HIGH (ref 10.6–13.6)
RBC # BLD: 4.73 M/UL — SIGNIFICANT CHANGE UP (ref 4.2–5.8)
RBC # FLD: 13.3 % — SIGNIFICANT CHANGE UP (ref 10.3–14.5)
SARS-COV-2 RNA SPEC QL NAA+PROBE: SIGNIFICANT CHANGE UP
SODIUM SERPL-SCNC: 138 MMOL/L — SIGNIFICANT CHANGE UP (ref 135–145)
WBC # BLD: 8.95 K/UL — SIGNIFICANT CHANGE UP (ref 3.8–10.5)
WBC # FLD AUTO: 8.95 K/UL — SIGNIFICANT CHANGE UP (ref 3.8–10.5)

## 2021-12-17 PROCEDURE — 99285 EMERGENCY DEPT VISIT HI MDM: CPT

## 2021-12-17 PROCEDURE — 73552 X-RAY EXAM OF FEMUR 2/>: CPT | Mod: 26,LT

## 2021-12-17 PROCEDURE — 71045 X-RAY EXAM CHEST 1 VIEW: CPT | Mod: 26

## 2021-12-17 PROCEDURE — 93971 EXTREMITY STUDY: CPT | Mod: 26,LT

## 2021-12-17 PROCEDURE — 93010 ELECTROCARDIOGRAM REPORT: CPT

## 2021-12-17 PROCEDURE — 73700 CT LOWER EXTREMITY W/O DYE: CPT | Mod: 26,LT,MA

## 2021-12-17 PROCEDURE — 99222 1ST HOSP IP/OBS MODERATE 55: CPT | Mod: GC

## 2021-12-17 RX ORDER — VANCOMYCIN HCL 1 G
1000 VIAL (EA) INTRAVENOUS ONCE
Refills: 0 | Status: DISCONTINUED | OUTPATIENT
Start: 2021-12-17 | End: 2021-12-18

## 2021-12-17 RX ORDER — PIPERACILLIN AND TAZOBACTAM 4; .5 G/20ML; G/20ML
3.38 INJECTION, POWDER, LYOPHILIZED, FOR SOLUTION INTRAVENOUS ONCE
Refills: 0 | Status: COMPLETED | OUTPATIENT
Start: 2021-12-17 | End: 2021-12-17

## 2021-12-17 RX ORDER — ACETAMINOPHEN 500 MG
650 TABLET ORAL ONCE
Refills: 0 | Status: COMPLETED | OUTPATIENT
Start: 2021-12-17 | End: 2021-12-17

## 2021-12-17 RX ADMIN — Medication 650 MILLIGRAM(S): at 19:08

## 2021-12-17 NOTE — ED PROVIDER NOTE - CONSTITUTIONAL, MLM
normal... Well appearing, awake, alert, oriented to person, place, time/situation and in no apparent distress. on 2L nc

## 2021-12-17 NOTE — ED PROVIDER NOTE - EKG #1 DATE/TIME
Online Visit    Date/Time: 11/3/2018 2:21:16 PM  To: DIANA DAVID  From: CHERYL COPPOLA  Subject:    Dear Diana,  I am happy to inform you that your recent Pap smear and HPV test were both negative, and you are presently at low risk for cervical cancer. According to current guidelines, we can repeat your next Pap smear in 3 years.    I encourage you to continue your annual gynecologic visits so that you may receive regular breast and pelvic examinations.    If you have any questions regarding this result, please contact my office. Thank you for allowing me to participate in your care.  Have a great day!    Best regards,  Dr. Coppola      Verified Results  PAP TEST WITH HIGH RISK HPV 2018 12:01AM CHERYL COPPOLA     Test Name Result Flag Reference   PAP WITH HIGH RISK HPV (Report) O    Name: DAVIDDIANA       MRN:   [id number]   : 1978           Visit#: [id number]                Gynecologic Cytology Consultation Report      Client:  [facility]      Date Specimen Collected: 10/25/18      Accession #: [id number]   Date Specimen Received: 10/26/18      Requisition   #:[id number]   Date Reported:      2018 15:36  Location:   [facility]   FACULTY PRACTICE                * Addendum Present *   ______________________________________________________________________________   Cytologic Interpretation :      Negative for intraepithelial lesion or malignancy.       Satisfactory for evaluation. Presence of endocervical/transformation zone   component.         SIS Amin(ASCP)   ** Electronic Signature (NLB) 2018  15:36 **      Educational note: The Pap test is a screening test with a well-recognized   false negative rate. The best means available to lower the false negative   rate and to detect early cervical lesions is a Pap test at regular intervals.    All ThinPrep Paps will be reviewed with the aid of the The GrommetPrep Imaging   System,  unless otherwise specified.      ______________________________________________________________________________   Clinical Information:   Menstrual Hx: Regular Menses   Other Clinical Conditions:Pap source: Endocervical   REASON FOR COLLECTION::HIGH RISK - OTHER SPECIFIED PERSONAL RISK FACTORS, NOT   ELSEWHERE CLASSIFIED Z91.89   SOURCE::ENDOCERVICAL      Specimen(s) Submitted:    PAP with High Risk HPV      Procedures/Addenda:   HPV, High Risk_IL:   Date Ordered:   10/26/2018   Date Reported:10/29/2018      Interpretation   Aptima HPV HIGH RISK (TYPES 16,18,31,33,35,39,45,51,52,56,58,59,66,68):   NEGATIVE      The APTIMA HPV Assay is an FDA approved in vitro nucleic acid amplification   test for the qualitative detection of E6/E7 viral messenger RNA (mRNA) from 14   high-risk types of human papillomavirus (HPV) in cervical specimens. The   high-risk HPV types detected by the assay include: 16, 18, 31, 33, 35, 39, 45,   51, 52, 56, 58, 59, 66, and 68. The Aptima HPV Assay does not discriminate   between the 14 high-risk types. Cervical specimens in the Thin Prep Pap Test   vials containing PreservCyt Solution and collected with broom-type or   cytobrush/spatula collection devices may be tested with this assay using the   RachioHER System.       The APTIMA HPV Assay is intended to screen women 21 years and older with   atypical squamous cells of undetermined significance (ASC-US) cervical   cytology results to determine the need for referral to colposcopy. Test   results are not intended to prevent women from proceeding to colposcopy.       In women 30 years and older, the above assay can be used with cervical   cytology to adjunctively screen to assess the presence or absence of high-risk   HPV types. This information, with the physician's assessment of cytology   history, other risk factors, and guidelines, may be used to guide patient   management.                         ** Electronic Signature ** (PRANAV) 10/29/2018  11:39 **            ICD Codes:    Z01.419      Fee Codes:    A: [id number]    HPV_IL: [id number]      Performing Lab Location (Unless otherwise specified):   [facility] [address] [address]        17-Dec-2021 16:45

## 2021-12-17 NOTE — ED PROVIDER NOTE - OBJECTIVE STATEMENT
Pt is a 82 yo male with pmhx of CAD COPD on 2L nc as needed DM osteomyelitis compound fracture of left leg PVD CABG CAD stent on plavix and eliquis here for left leg pain for one week denies any trauma but wife states may have bumped his leg denies any fever chills chest pain sob. Pt states has had multiple surgeries to leg. Pt states pain worse with walking.     pcp Austin Smith

## 2021-12-17 NOTE — ED ADULT NURSE NOTE - NSIMPLEMENTINTERV_GEN_ALL_ED
Implemented All Fall with Harm Risk Interventions:  Gary to call system. Call bell, personal items and telephone within reach. Instruct patient to call for assistance. Room bathroom lighting operational. Non-slip footwear when patient is off stretcher. Physically safe environment: no spills, clutter or unnecessary equipment. Stretcher in lowest position, wheels locked, appropriate side rails in place. Provide visual cue, wrist band, yellow gown, etc. Monitor gait and stability. Monitor for mental status changes and reorient to person, place, and time. Review medications for side effects contributing to fall risk. Reinforce activity limits and safety measures with patient and family. Provide visual clues: red socks.

## 2021-12-17 NOTE — ED PROVIDER NOTE - WR ORDER DATE AND TIME 2
In to see patient. Patient noted to have parietal bone overlapping frontal bone and occipital bone. Has not latched since birth. Resistant to opening mouth wide. Mom states he was breech and his head was under her rib cage most of the pregnancy. Infant has full range of motion of his neck, however he favors keeping his head turned to the right. Parents encouraged to keep infant's head more midline when they are holding him. If placing him in carseat to use rolled washcloths for head support on both sides to keep his head midline. Infant's palate is noted to be flat during oral assessment. Infant initially did not suck when placing gloved finger in his mouth. Vomer release done with infant tolerating well. Infant then began to suck. Placed to right breast in laid back position. Infant roots and makes attempts to latch, however does not latch successfully. Positioned to left breast in laid back position, no successful latch. Positioned to right breast in football hold, infant does not maintain a latch. Mallika Johns in room to assist with latch. 17-Dec-2021 16:21

## 2021-12-17 NOTE — ED PROVIDER NOTE - PROGRESS NOTE DETAILS
Dr. Ferguson: Recd sign out from [facility], pt with h/o osteo left thigh in the past, p/w left thigh swelling and pain x 3 weeks after bumping left against a table. Pt is on plavix. No fevers or chills. On exam pt is well appearing, nad, left thigh with no TTP, no erythema or warmth, no ttp. Labs unremarkable. No fevers. Osteo unlikely however pt needs mri to diagnose. Shared decision making performed with pt and wife. If CT unremarkable, pt will dc home and f/u with pmd for outpatient mri. + intramedullary abscess seen in ct PA Jeremiah paged juni and zosyn ordered admit to medicine

## 2021-12-17 NOTE — ED PROVIDER NOTE - ATTENDING CONTRIBUTION TO CARE
82 yo M p/w hx osteomyelitis p/w L leg pain x past ~ 1 week. Pt states may have bumped L leg earlier in week. Pt states this is similar to his osteomyelitis in past. No fever/chills. no cough/ uri. No covid exposures, pt fully vaccinated. no numb/ting/focal weak. no recent travel. no sick contacts. No agg/allev factors. no other acute co.e  exam: MM Moist. mild edema L leg, nl dist str/sens equal bl, 2+ pulses. nl cap refill. CTA bl, no w/r/r. Nl cardiac exa.m No other acute findings.  check labs, xr, IV, TBA

## 2021-12-18 ENCOUNTER — TRANSCRIPTION ENCOUNTER (OUTPATIENT)
Age: 81
End: 2021-12-18

## 2021-12-18 DIAGNOSIS — I73.9 PERIPHERAL VASCULAR DISEASE, UNSPECIFIED: ICD-10-CM

## 2021-12-18 DIAGNOSIS — I25.10 ATHEROSCLEROTIC HEART DISEASE OF NATIVE CORONARY ARTERY WITHOUT ANGINA PECTORIS: ICD-10-CM

## 2021-12-18 DIAGNOSIS — Z87.438 PERSONAL HISTORY OF OTHER DISEASES OF MALE GENITAL ORGANS: ICD-10-CM

## 2021-12-18 DIAGNOSIS — N17.9 ACUTE KIDNEY FAILURE, UNSPECIFIED: ICD-10-CM

## 2021-12-18 DIAGNOSIS — I10 ESSENTIAL (PRIMARY) HYPERTENSION: ICD-10-CM

## 2021-12-18 DIAGNOSIS — E78.5 HYPERLIPIDEMIA, UNSPECIFIED: ICD-10-CM

## 2021-12-18 DIAGNOSIS — Z86.79 PERSONAL HISTORY OF OTHER DISEASES OF THE CIRCULATORY SYSTEM: ICD-10-CM

## 2021-12-18 DIAGNOSIS — E11.9 TYPE 2 DIABETES MELLITUS WITHOUT COMPLICATIONS: ICD-10-CM

## 2021-12-18 DIAGNOSIS — L02.91 CUTANEOUS ABSCESS, UNSPECIFIED: ICD-10-CM

## 2021-12-18 DIAGNOSIS — L03.119 CELLULITIS OF UNSPECIFIED PART OF LIMB: ICD-10-CM

## 2021-12-18 DIAGNOSIS — Z87.09 PERSONAL HISTORY OF OTHER DISEASES OF THE RESPIRATORY SYSTEM: ICD-10-CM

## 2021-12-18 DIAGNOSIS — Z29.9 ENCOUNTER FOR PROPHYLACTIC MEASURES, UNSPECIFIED: ICD-10-CM

## 2021-12-18 LAB
A1C WITH ESTIMATED AVERAGE GLUCOSE RESULT: 7.6 % — HIGH (ref 4–5.6)
ALBUMIN SERPL ELPH-MCNC: 2.5 G/DL — LOW (ref 3.3–5)
ALP SERPL-CCNC: 90 U/L — SIGNIFICANT CHANGE UP (ref 40–120)
ALT FLD-CCNC: 11 U/L — LOW (ref 12–78)
ANION GAP SERPL CALC-SCNC: 10 MMOL/L — SIGNIFICANT CHANGE UP (ref 5–17)
APTT BLD: 36.9 SEC — HIGH (ref 27.5–35.5)
AST SERPL-CCNC: 16 U/L — SIGNIFICANT CHANGE UP (ref 15–37)
BASOPHILS # BLD AUTO: 0.02 K/UL — SIGNIFICANT CHANGE UP (ref 0–0.2)
BASOPHILS NFR BLD AUTO: 0.3 % — SIGNIFICANT CHANGE UP (ref 0–2)
BILIRUB SERPL-MCNC: 0.5 MG/DL — SIGNIFICANT CHANGE UP (ref 0.2–1.2)
BUN SERPL-MCNC: 25 MG/DL — HIGH (ref 7–23)
CALCIUM SERPL-MCNC: 9.2 MG/DL — SIGNIFICANT CHANGE UP (ref 8.5–10.1)
CHLORIDE SERPL-SCNC: 108 MMOL/L — SIGNIFICANT CHANGE UP (ref 96–108)
CO2 SERPL-SCNC: 21 MMOL/L — LOW (ref 22–31)
CREAT SERPL-MCNC: 1.5 MG/DL — HIGH (ref 0.5–1.3)
CRP SERPL-MCNC: 70 MG/L — HIGH
EOSINOPHIL # BLD AUTO: 0.04 K/UL — SIGNIFICANT CHANGE UP (ref 0–0.5)
EOSINOPHIL NFR BLD AUTO: 0.5 % — SIGNIFICANT CHANGE UP (ref 0–6)
ERYTHROCYTE [SEDIMENTATION RATE] IN BLOOD: 42 MM/HR — HIGH (ref 0–20)
ESTIMATED AVERAGE GLUCOSE: 171 MG/DL — HIGH (ref 68–114)
GLUCOSE SERPL-MCNC: 153 MG/DL — HIGH (ref 70–99)
HCT VFR BLD CALC: 35.1 % — LOW (ref 39–50)
HGB BLD-MCNC: 11.1 G/DL — LOW (ref 13–17)
IMM GRANULOCYTES NFR BLD AUTO: 0.7 % — SIGNIFICANT CHANGE UP (ref 0–1.5)
INR BLD: 1.15 RATIO — SIGNIFICANT CHANGE UP (ref 0.88–1.16)
LYMPHOCYTES # BLD AUTO: 1.25 K/UL — SIGNIFICANT CHANGE UP (ref 1–3.3)
LYMPHOCYTES # BLD AUTO: 16.7 % — SIGNIFICANT CHANGE UP (ref 13–44)
MCHC RBC-ENTMCNC: 27.3 PG — SIGNIFICANT CHANGE UP (ref 27–34)
MCHC RBC-ENTMCNC: 31.6 GM/DL — LOW (ref 32–36)
MCV RBC AUTO: 86.5 FL — SIGNIFICANT CHANGE UP (ref 80–100)
MONOCYTES # BLD AUTO: 0.88 K/UL — SIGNIFICANT CHANGE UP (ref 0–0.9)
MONOCYTES NFR BLD AUTO: 11.7 % — SIGNIFICANT CHANGE UP (ref 2–14)
NEUTROPHILS # BLD AUTO: 5.25 K/UL — SIGNIFICANT CHANGE UP (ref 1.8–7.4)
NEUTROPHILS NFR BLD AUTO: 70.1 % — SIGNIFICANT CHANGE UP (ref 43–77)
NRBC # BLD: 0 /100 WBCS — SIGNIFICANT CHANGE UP (ref 0–0)
PLATELET # BLD AUTO: 334 K/UL — SIGNIFICANT CHANGE UP (ref 150–400)
POTASSIUM SERPL-MCNC: 4.6 MMOL/L — SIGNIFICANT CHANGE UP (ref 3.5–5.3)
POTASSIUM SERPL-SCNC: 4.6 MMOL/L — SIGNIFICANT CHANGE UP (ref 3.5–5.3)
PROT SERPL-MCNC: 7.1 G/DL — SIGNIFICANT CHANGE UP (ref 6–8.3)
PROTHROM AB SERPL-ACNC: 13.4 SEC — SIGNIFICANT CHANGE UP (ref 10.6–13.6)
RBC # BLD: 4.06 M/UL — LOW (ref 4.2–5.8)
RBC # FLD: 13.2 % — SIGNIFICANT CHANGE UP (ref 10.3–14.5)
SODIUM SERPL-SCNC: 139 MMOL/L — SIGNIFICANT CHANGE UP (ref 135–145)
WBC # BLD: 7.49 K/UL — SIGNIFICANT CHANGE UP (ref 3.8–10.5)
WBC # FLD AUTO: 7.49 K/UL — SIGNIFICANT CHANGE UP (ref 3.8–10.5)

## 2021-12-18 PROCEDURE — 99222 1ST HOSP IP/OBS MODERATE 55: CPT

## 2021-12-18 PROCEDURE — 99233 SBSQ HOSP IP/OBS HIGH 50: CPT | Mod: GC

## 2021-12-18 PROCEDURE — 99223 1ST HOSP IP/OBS HIGH 75: CPT

## 2021-12-18 RX ORDER — ATORVASTATIN CALCIUM 80 MG/1
80 TABLET, FILM COATED ORAL AT BEDTIME
Refills: 0 | Status: DISCONTINUED | OUTPATIENT
Start: 2021-12-18 | End: 2021-12-30

## 2021-12-18 RX ORDER — DEXTROSE 50 % IN WATER 50 %
12.5 SYRINGE (ML) INTRAVENOUS ONCE
Refills: 0 | Status: DISCONTINUED | OUTPATIENT
Start: 2021-12-18 | End: 2021-12-30

## 2021-12-18 RX ORDER — VANCOMYCIN HCL 1 G
1500 VIAL (EA) INTRAVENOUS EVERY 24 HOURS
Refills: 0 | Status: COMPLETED | OUTPATIENT
Start: 2021-12-18 | End: 2021-12-24

## 2021-12-18 RX ORDER — INSULIN LISPRO 100/ML
VIAL (ML) SUBCUTANEOUS
Refills: 0 | Status: DISCONTINUED | OUTPATIENT
Start: 2021-12-18 | End: 2021-12-19

## 2021-12-18 RX ORDER — CLOPIDOGREL BISULFATE 75 MG/1
75 TABLET, FILM COATED ORAL DAILY
Refills: 0 | Status: DISCONTINUED | OUTPATIENT
Start: 2021-12-18 | End: 2021-12-20

## 2021-12-18 RX ORDER — CEFEPIME 1 G/1
2000 INJECTION, POWDER, FOR SOLUTION INTRAMUSCULAR; INTRAVENOUS EVERY 12 HOURS
Refills: 0 | Status: DISCONTINUED | OUTPATIENT
Start: 2021-12-18 | End: 2021-12-29

## 2021-12-18 RX ORDER — DEXTROSE 50 % IN WATER 50 %
25 SYRINGE (ML) INTRAVENOUS ONCE
Refills: 0 | Status: DISCONTINUED | OUTPATIENT
Start: 2021-12-18 | End: 2021-12-30

## 2021-12-18 RX ORDER — INSULIN LISPRO 100/ML
VIAL (ML) SUBCUTANEOUS AT BEDTIME
Refills: 0 | Status: DISCONTINUED | OUTPATIENT
Start: 2021-12-18 | End: 2021-12-19

## 2021-12-18 RX ORDER — TIOTROPIUM BROMIDE 18 UG/1
1 CAPSULE ORAL; RESPIRATORY (INHALATION) DAILY
Refills: 0 | Status: DISCONTINUED | OUTPATIENT
Start: 2021-12-18 | End: 2021-12-30

## 2021-12-18 RX ORDER — PIPERACILLIN AND TAZOBACTAM 4; .5 G/20ML; G/20ML
3.38 INJECTION, POWDER, LYOPHILIZED, FOR SOLUTION INTRAVENOUS EVERY 8 HOURS
Refills: 0 | Status: DISCONTINUED | OUTPATIENT
Start: 2021-12-18 | End: 2021-12-18

## 2021-12-18 RX ORDER — CEFEPIME 1 G/1
2000 INJECTION, POWDER, FOR SOLUTION INTRAMUSCULAR; INTRAVENOUS EVERY 12 HOURS
Refills: 0 | Status: DISCONTINUED | OUTPATIENT
Start: 2021-12-18 | End: 2021-12-18

## 2021-12-18 RX ORDER — TAMSULOSIN HYDROCHLORIDE 0.4 MG/1
0.4 CAPSULE ORAL AT BEDTIME
Refills: 0 | Status: DISCONTINUED | OUTPATIENT
Start: 2021-12-18 | End: 2021-12-30

## 2021-12-18 RX ORDER — AZITHROMYCIN 500 MG/1
250 TABLET, FILM COATED ORAL
Refills: 0 | Status: DISCONTINUED | OUTPATIENT
Start: 2021-12-18 | End: 2021-12-30

## 2021-12-18 RX ORDER — GLUCAGON INJECTION, SOLUTION 0.5 MG/.1ML
1 INJECTION, SOLUTION SUBCUTANEOUS ONCE
Refills: 0 | Status: DISCONTINUED | OUTPATIENT
Start: 2021-12-18 | End: 2021-12-30

## 2021-12-18 RX ORDER — DEXTROSE 50 % IN WATER 50 %
15 SYRINGE (ML) INTRAVENOUS ONCE
Refills: 0 | Status: DISCONTINUED | OUTPATIENT
Start: 2021-12-18 | End: 2021-12-30

## 2021-12-18 RX ORDER — BUDESONIDE AND FORMOTEROL FUMARATE DIHYDRATE 160; 4.5 UG/1; UG/1
2 AEROSOL RESPIRATORY (INHALATION)
Refills: 0 | Status: DISCONTINUED | OUTPATIENT
Start: 2021-12-18 | End: 2021-12-30

## 2021-12-18 RX ORDER — ACETAMINOPHEN 500 MG
650 TABLET ORAL EVERY 6 HOURS
Refills: 0 | Status: DISCONTINUED | OUTPATIENT
Start: 2021-12-18 | End: 2021-12-30

## 2021-12-18 RX ORDER — ALBUTEROL 90 UG/1
2 AEROSOL, METERED ORAL
Qty: 0 | Refills: 0 | DISCHARGE

## 2021-12-18 RX ORDER — SODIUM CHLORIDE 9 MG/ML
1000 INJECTION, SOLUTION INTRAVENOUS
Refills: 0 | Status: DISCONTINUED | OUTPATIENT
Start: 2021-12-18 | End: 2021-12-30

## 2021-12-18 RX ORDER — SODIUM CHLORIDE 9 MG/ML
1000 INJECTION INTRAMUSCULAR; INTRAVENOUS; SUBCUTANEOUS
Refills: 0 | Status: DISCONTINUED | OUTPATIENT
Start: 2021-12-18 | End: 2021-12-19

## 2021-12-18 RX ORDER — METOPROLOL TARTRATE 50 MG
50 TABLET ORAL
Refills: 0 | Status: DISCONTINUED | OUTPATIENT
Start: 2021-12-18 | End: 2021-12-30

## 2021-12-18 RX ADMIN — TIOTROPIUM BROMIDE 1 CAPSULE(S): 18 CAPSULE ORAL; RESPIRATORY (INHALATION) at 09:37

## 2021-12-18 RX ADMIN — Medication 1: at 12:15

## 2021-12-18 RX ADMIN — CLOPIDOGREL BISULFATE 75 MILLIGRAM(S): 75 TABLET, FILM COATED ORAL at 12:08

## 2021-12-18 RX ADMIN — PIPERACILLIN AND TAZOBACTAM 200 GRAM(S): 4; .5 INJECTION, POWDER, LYOPHILIZED, FOR SOLUTION INTRAVENOUS at 00:17

## 2021-12-18 RX ADMIN — BUDESONIDE AND FORMOTEROL FUMARATE DIHYDRATE 2 PUFF(S): 160; 4.5 AEROSOL RESPIRATORY (INHALATION) at 18:22

## 2021-12-18 RX ADMIN — TAMSULOSIN HYDROCHLORIDE 0.4 MILLIGRAM(S): 0.4 CAPSULE ORAL at 22:48

## 2021-12-18 RX ADMIN — ATORVASTATIN CALCIUM 80 MILLIGRAM(S): 80 TABLET, FILM COATED ORAL at 22:48

## 2021-12-18 RX ADMIN — SODIUM CHLORIDE 60 MILLILITER(S): 9 INJECTION INTRAMUSCULAR; INTRAVENOUS; SUBCUTANEOUS at 03:44

## 2021-12-18 RX ADMIN — Medication 50 MILLIGRAM(S): at 06:58

## 2021-12-18 RX ADMIN — Medication 50 MILLIGRAM(S): at 18:00

## 2021-12-18 RX ADMIN — Medication 650 MILLIGRAM(S): at 00:05

## 2021-12-18 RX ADMIN — CEFEPIME 100 MILLIGRAM(S): 1 INJECTION, POWDER, FOR SOLUTION INTRAMUSCULAR; INTRAVENOUS at 17:59

## 2021-12-18 RX ADMIN — PIPERACILLIN AND TAZOBACTAM 25 GRAM(S): 4; .5 INJECTION, POWDER, LYOPHILIZED, FOR SOLUTION INTRAVENOUS at 08:29

## 2021-12-18 RX ADMIN — Medication 1: at 06:58

## 2021-12-18 RX ADMIN — BUDESONIDE AND FORMOTEROL FUMARATE DIHYDRATE 2 PUFF(S): 160; 4.5 AEROSOL RESPIRATORY (INHALATION) at 08:30

## 2021-12-18 RX ADMIN — Medication 4 MILLIGRAM(S): at 06:58

## 2021-12-18 RX ADMIN — Medication 300 MILLIGRAM(S): at 05:24

## 2021-12-18 NOTE — CONSULT NOTE ADULT - SUBJECTIVE AND OBJECTIVE BOX
Date/Time Patient Seen:  		  Referring MD:   Data Reviewed	       Patient is a 81y old  Male who presents with a chief complaint of     Subjective/HPI  vs noted  labs reviewed  H and P reviewed  ER provider note reviewed  covering for Dr Dejesus  on NIPPV for COURTNEY  ct imaging reviewed     History of Present Illness:  History of Present Illness:   The patient is an 80yo male with pmhx CAD s/p 3v CABG , stents in 2019, HLD, HTN, PAT(on Eliquis), PVD(s/p stents in b/l legs -- right leg in ), osteomyelitis, COPD on prn home o2 and nocturnal bipap, type 2 Dm who presented to ED with increasing left leg pain x 1 week. States it had started a few weeks prior, but was intermittent and not as painful. Does admits to bumping his leg on a table a few times. Describes his pain as worse with movement and when pressure is applied. Denies rash, itching, fevers, chills, n/v/d, bleeding, cp, sob, sick contacts, numbness, tingling, new focal weakness, decreased ROM of LLE. Has been ambulating with a cane 2/2 pain. Of note has history of osteomyelitis of LLE and has has multiple operations on that leg.    In the ED cbc, cmp, coags, lacate,  CT femur , LLE duplex and covid pcr were obtained. significant for bun/cr 26/1.6, glucose 162, album 2.9, ca 10.2, lactate 1.9. covid pcr negative. US No evidence of left lower extremity deep venous thrombosis. CT revealed Old healed deformity of the mid left femur, Complex heterogeneous lobulated soft tissue anterior and lateral to the middle two thirds of the left femur   concerning for an abscess. New lobulated intramedullary defect with tract   extending to the cortical surface in the mid left femur concerning for a   possible intramedullary abscess.   Pt was given vanc & zosyn x 1, tylenol 650mg x 1.     FAMILY HISTORY:  Family hx of lung cancer, brother,  age 82, used to smoke with pt    Sibling  Still living? Unknown  Family history of diabetes mellitus, Age at diagnosis: Age Unknown.     Social History:  Social History (marital status, living situation, occupation, tobacco use, alcohol and drug use, and sexual history): Tobacco: Denies, former smoker-quit 30 years ago, 1ppd x 20 years  EtOH: Denies  Recreational drug use: former frequent marijuana - last use 2-3 yrs ago  Lives with: Wife  Ambulates: Without assistance; with cane when in pain  COVID vaccinated moderna ; received booster     Tobacco Screening:  · Core Measure Site	Yes  · Has the patient used tobacco in the past 30 days?	No    Risk Assessment:    Present on Admission:  Deep Venous Thrombosis	no  Pulmonary Embolus	no     Heart Failure:  Does this patient have a history of or has been diagnosed with heart failure? no.       PAST MEDICAL & SURGICAL HISTORY:  Diabetes Mellitus, Type II    CAD (Coronary Artery Disease)  s/p 3v CABG ; stents placed in [place] in     Dyslipidemia    Asymptomatic Peripheral Vascular Disease    Osteomyelitis    COPD (chronic obstructive pulmonary disease)  on 2L at home and BiPAP at night; intubated     Diabetes mellitus    Hypertension    PVD (peripheral vascular disease)    History of PAT (paroxysmal atrial tachycardia)    CABG (Coronary Artery Bypass Graft)      Compound fracture  left leg    S/P primary angioplasty with coronary stent          Medication list         MEDICATIONS  (STANDING):  atorvastatin 80 milliGRAM(s) Oral at bedtime  azithromycin   Tablet 250 milliGRAM(s) Oral <User Schedule>  budesonide 160 MICROgram(s)/formoterol 4.5 MICROgram(s) Inhaler 2 Puff(s) Inhalation two times a day  clopidogrel Tablet 75 milliGRAM(s) Oral daily  dextrose 40% Gel 15 Gram(s) Oral once  dextrose 5%. 1000 milliLiter(s) (50 mL/Hr) IV Continuous <Continuous>  dextrose 5%. 1000 milliLiter(s) (100 mL/Hr) IV Continuous <Continuous>  dextrose 50% Injectable 25 Gram(s) IV Push once  dextrose 50% Injectable 12.5 Gram(s) IV Push once  dextrose 50% Injectable 25 Gram(s) IV Push once  glucagon  Injectable 1 milliGRAM(s) IntraMuscular once  insulin lispro (ADMELOG) corrective regimen sliding scale   SubCutaneous three times a day before meals  insulin lispro (ADMELOG) corrective regimen sliding scale   SubCutaneous at bedtime  metoprolol tartrate 50 milliGRAM(s) Oral two times a day  piperacillin/tazobactam IVPB.. 3.375 Gram(s) IV Intermittent every 8 hours  predniSONE   Tablet 4 milliGRAM(s) Oral daily  sodium chloride 0.9%. 1000 milliLiter(s) (60 mL/Hr) IV Continuous <Continuous>  tamsulosin 0.4 milliGRAM(s) Oral at bedtime  tiotropium 18 MICROgram(s) Capsule 1 Capsule(s) Inhalation daily  vancomycin  IVPB 1500 milliGRAM(s) IV Intermittent every 24 hours    MEDICATIONS  (PRN):  acetaminophen     Tablet .. 650 milliGRAM(s) Oral every 6 hours PRN Mild Pain (1 - 3), Moderate Pain (4 - 6)         Vitals log        ICU Vital Signs Last 24 Hrs  T(C): 36.7 (17 Dec 2021 15:01), Max: 36.7 (17 Dec 2021 15:01)  T(F): 98 (17 Dec 2021 15:01), Max: 98 (17 Dec 2021 15:01)  HR: 88 (18 Dec 2021 05:26) (78 - 104)  BP: 118/65 (18 Dec 2021 05:26) (116/67 - 120/80)  BP(mean): --  ABP: --  ABP(mean): --  RR: 18 (18 Dec 2021 05:26) (18 - 18)  SpO2: 100% (18 Dec 2021 05:26) (96% - 100%)           Input and Output:  I&O's Detail      Lab Data                        11.1   7.49  )-----------( 334      ( 18 Dec 2021 04:53 )             35.1     12-    138  |  103  |  26<H>  ----------------------------<  162<H>  5.0   |  28  |  1.60<H>    Ca    10.2<H>      17 Dec 2021 17:43    TPro  7.7  /  Alb  2.9<L>  /  TBili  0.2  /  DBili  x   /  AST  15  /  ALT  13  /  AlkPhos  102  12-17            Review of Systems	  weakness    Objective     Physical Examination    heart s1s2  lung dec BS  abd soft  head nc      Pertinent Lab findings & Imaging      Gonzáles:  NO   Adequate UO     I&O's Detail           Discussed with:     Cultures:	        Radiology        ACC: [id number] EXAM:  US DPLX LWR EXT VEINS LTD LT                          PROCEDURE DATE:  2021          INTERPRETATION:  CLINICAL INFORMATION: Left leg pain. Evaluate for DVT.    COMPARISON: 2020.    TECHNIQUE: Duplex sonography of the LEFT LOWER extremity veins with color   and spectral Doppler, with and without compression.    FINDINGS:    There is normal compressibility of the left common femoral, femoral and   popliteal veins.  The contralateral common femoral vein is patent.  Doppler examination shows normal spontaneous and phasic flow.    No calf vein thrombosis is detected.    IMPRESSION:  No evidence of left lower extremity deep venous thrombosis.          --- End of Report ---            ELEAZAR CASTAÑEDA MD; Attending Radiologist  This document has been electronically signed. Dec 17 2021  8:17PM    ACC: [id number] EXAM:  CT FEMUR ONLY LT                          PROCEDURE DATE:  2021          INTERPRETATION:  CLINICAL INFORMATION: Left leg pain. History of fracture   and osteomyelitis.    TECHNIQUE: CT of the left femur was performed in bone and soft tissue   windows with coronal and sagittal reformats. No intravenous contrast was   administered.    COMPARISON: CT of bilateral femurs from 2010.    FINDINGS:    Bone: An old healed deformity of the left mid femur is not significantly   changed. An old left femoral intramedullary dominick tract is noted. No acute   fracture or dislocation is demonstrated. Heterotopic ossification   superior to the left greater trochanter is not significantly changed. A   new lobulated 2.2 x 1.8 cm intramedullary defect with tract to the   lateral cortical surface is seen in the mid femur. Degenerative changes   of the left knee is noted.    Soft tissues: Complex heterogeneous lobulated soft tissue measuring 6.9 x   6.9 x 18.3 cm is seen anterior and lateral to the femur involving the   middle two thirds of the femur with mild adjacent inflammatory change.   The vastus intermedialis and lateralis musculature is markedly atrophic.   Vascular calcifications are noted. Bilateral external iliac artery and   left mid to distal femoral artery stents are seen.    IMPRESSION:    Old healed deformity of the mid left femur likely correlating with the   history of a prior osteomyelitis. Complex heterogeneous lobulated soft   tissue anterior and lateral to the middle two thirds of the left femur   concerning for an abscess. New lobulated intramedullary defect with tract   extending to the cortical surface in the mid left femur concerning for a   possible intramedullary abscess. A contrast-enhanced MRI of the left   femur is recommended for further evaluation.    --- End of Report ---            PAZ CHUA MD; Attending Radiologist  This document has been electronically signed. Dec 17 2021 11:20PM

## 2021-12-18 NOTE — PHARMACOTHERAPY INTERVENTION NOTE - COMMENTS
Patient is a 81y M ordered for empiric vancomycin 1500mg IV q24h (CrCl 42 mL/min) for left femoral OM and intramedullary abscess. Discussed with ID Dr. العلي to recommend ordering a trough for pre-3rd dose on 12/20 @ 5:00. MD agreed and ordered trough as per discussion.

## 2021-12-18 NOTE — H&P ADULT - PROBLEM SELECTOR PLAN 2
- MERRILL on CKD; Cr on presentation 1.60. Baseline Cr 1.20  - Will start IVF  - hold home losartan  - hold all nephrotoxic agents  - tend CMP

## 2021-12-18 NOTE — H&P ADULT - HISTORY OF PRESENT ILLNESS
82 yo m pmh CAD s/p stent, HLD, HTN, PVD, COPD on prn home o2 and nocturnal bipap, type 2 Dm presents to ED with leg pain x 1 week .     In the ED cbc, cmp, coags, lacate,  CT femur , LLE duplex and covid pcr were obtained. significant for bun/cr 26/1.6, glucose 162, album 2.9, ca 10.2, lactate 1.9. covid pcr negative. US No evidence of left lower extremity deep venous thrombosis. CT revealed Old healed deformity of the mid left femur, Complex heterogeneous lobulated soft tissue anterior and lateral to the middle two thirds of the left femur   concerning for an abscess. New lobulated intramedullary defect with tract   extending to the cortical surface in the mid left femur concerning for a   possible intramedullary abscess.   Pt was given vanc & zosyn x 1, tylenol 650mg x 1.  The patient is an 80yo male with pmhx CAD s/p 3v CABG 2004, stents in 2019, HLD, HTN, PAT(on Eliquis), PVD(s/p stents in b/l legs -- right leg in 2020), osteomyelitis, COPD on prn home o2 and nocturnal bipap, type 2 Dm who presented to ED with increasing left leg pain x 1 week. States it had started a few weeks prior, but was intermittent and not as painful. Does admits to bumping his leg on a table a few times. Describes his pain as worse with movement and when pressure is applied. Denies rash, itching, fevers, chills, n/v/d, bleeding, cp, sob, sick contacts, numbness, tingling, new focal weakness, decreased ROM of LLE. Has been ambulating with a cane 2/2 pain. Of note has history of osteomyelitis of LLE and has has multiple operations on that leg.    In the ED cbc, cmp, coags, lacate,  CT femur , LLE duplex and covid pcr were obtained. significant for bun/cr 26/1.6, glucose 162, album 2.9, ca 10.2, lactate 1.9. covid pcr negative. US No evidence of left lower extremity deep venous thrombosis. CT revealed Old healed deformity of the mid left femur, Complex heterogeneous lobulated soft tissue anterior and lateral to the middle two thirds of the left femur   concerning for an abscess. New lobulated intramedullary defect with tract   extending to the cortical surface in the mid left femur concerning for a   possible intramedullary abscess.   Pt was given vanc & zosyn x 1, tylenol 650mg x 1.

## 2021-12-18 NOTE — DISCHARGE NOTE PROVIDER - PROVIDER TOKENS
PROVIDER:[TOKEN:[[id number]]] PROVIDER:[TOKEN:[[id number]]],PROVIDER:[TOKEN:[[id number]],FOLLOWUP:[2 weeks]],PROVIDER:[TOKEN:[[id number]],FOLLOWUP:[2 weeks]]

## 2021-12-18 NOTE — DISCHARGE NOTE PROVIDER - CARE PROVIDER_API CALL
Sarah Avila)  Internal Medicine  [address] [place] [address]  Phone: [phone]  Fax: [phone]  Follow Up Time:    Sarah Avila)  Internal Medicine  [address] [place] [address]  Phone: [phone]  Fax: [phone]  Follow Up Time:     Brandi العلي)  Infectious Disease; Internal Medicine  [address] [place] [address]  Phone: [phone]  Fax: [phone]  Follow Up Time: 2 weeks    Giovani Cavazos)  Orthopaedic Surgery Surgery  [address] [place] [address]  Phone: [phone]  Fax: [phone]  Follow Up Time: 2 weeks

## 2021-12-18 NOTE — H&P ADULT - PROBLEM SELECTOR PLAN 8
- CAD s/p 3v CABG 2004, stents in 2019  - c/w plavix - CAD s/p 3v CABG 2004, stents in 2019  - continue beta blocker   - c/w plavix

## 2021-12-18 NOTE — H&P ADULT - PROBLEM SELECTOR PLAN 1
Intramedullary Abscess   - pt with h/o compound fracture/osteomyelitis of left femur. now p/w left leg pain.   - CT revealed Old healed deformity of the mid left femur , Complex heterogeneous lobulated soft  tissue anterior and lateral to the middle two thirds of the left femur   concerning for an abscess. New lobulated intramedullary defect with tract   extending to the cortical surface in the mid left femur concerning for a   possible intramedullary abscess.   - Doppler negative for DVT  - Afebrile, no leukocytosis. monitor for fever. trend daily cbc with diff   - s/p vancomycin & Zosyn x 1 continue   - imaging studies performed non contrast due to pt renal function. will likely require additional imaging. awaiting ortho recs   - Ortho consulted-- will see patient in am , recommend ID evaluation   - consult ID Dr. العلي Intramedullary Abscess   - pt with h/o compound fracture/osteomyelitis of left femur. now p/w left leg pain.   - CT revealed Old healed deformity of the mid left femur , Complex heterogeneous lobulated soft  tissue anterior and lateral to the middle two thirds of the left femur   concerning for an abscess. New lobulated intramedullary defect with tract   extending to the cortical surface in the mid left femur concerning for a   possible intramedullary abscess.   - tylenol for pain  - Doppler negative for DVT  - Afebrile, no leukocytosis. monitor for fever. trend daily cbc with diff   - s/p vancomycin & Zosyn x 1 continue   - imaging studies performed non contrast due to pt renal function. will likely require additional imaging. awaiting ortho recs   - Ortho consulted-- will see patient in am , recommend ID evaluation   - ID, Dr. العلي, consulted Intramedullary Abscess   - pt with h/o compound fracture/osteomyelitis of left femur. now p/w left leg pain.   - CT revealed Old healed deformity of the mid left femur , Complex heterogeneous lobulated soft  tissue anterior and lateral to the middle two thirds of the left femur   concerning for an abscess. New lobulated intramedullary defect with tract   extending to the cortical surface in the mid left femur concerning for a   possible intramedullary abscess.   - tylenol for pain  - f/u BCx x2  - Doppler negative for DVT  - Afebrile, no leukocytosis. monitor for fever. trend daily cbc with diff   - s/p vancomycin & Zosyn x 1 continue   - imaging studies performed non contrast due to pt renal function. will likely require additional imaging. awaiting ortho recs   - Ortho consulted-- will see patient in am , recommend ID evaluation   - ID, Dr. العلي, consulted

## 2021-12-18 NOTE — CHART NOTE - NSCHARTNOTEFT_GEN_A_CORE
Spoke with Operating surgeon that performed the primary I&D at [facility] in 2010. The surgeon informed me that while he may have considered continuing the care of this patient, they are currently under diversion and are unable to take patients at their facility.  Therefore, we will continue the current recommendations of attempting an IR aspiration and then IV antibiotics. Spoke with Operating surgeon that performed the primary I&D at [facility] in 2010. The surgeon informed me that while he may have considered continuing the care of this patient, they are currently under diversion and are unable to take patients at their facility.  Therefore, we will continue the current recommendations of attempting an IR aspiration and then IV antibiotics.    Further care will be under Dr. Varela

## 2021-12-18 NOTE — DISCHARGE NOTE PROVIDER - NSDCMRMEDTOKEN_GEN_ALL_CORE_FT
apixaban 5 mg oral tablet: 1 tab(s) orally every 12 hours  azithromycin 250 mg oral tablet: 1 tab(s) orally Monday, Wednesday, and Friday  Breo Ellipta 200 mcg-25 mcg/inh inhalation powder: 1 puff(s) inhaled once a day  clopidogrel 75 mg oral tablet: 1 tab(s) orally once a day  Incruse Ellipta 62.5 mcg/inh inhalation powder: 1 puff(s) inhaled every 24 hours  Jardiance 25 mg oral tablet: 1 tab(s) orally once a day (in the morning)  losartan 25 mg oral tablet: 1 tab(s) orally once a day  metFORMIN 1000 mg oral tablet: 1 tab(s) orally 2 times a day w/food  please resume the dose on 08/10/2021   metoprolol tartrate 50 mg oral tablet: 1 tab(s) orally 2 times a day  NovoLOG FlexPen 100 units/mL injectable solution: Inject 5 units at BS over 200, 10 units at BS over 300, and 15 units at BS over 400  Nucala 100 mg subcutaneous injection: 100 milligram(s) subcutaneous every 4 weeks    *Last given 12/18/21*  predniSONE 2 mg oral delayed release tablet: 2 tab(s) orally once a day  rosuvastatin 20 mg oral tablet: 1 tab(s) orally once a day (at bedtime)  tamsulosin 0.4 mg oral capsule: 1 cap(s) orally once a day (at bedtime)   apixaban 5 mg oral tablet: 1 tab(s) orally every 12 hours  aspirin 81 mg oral delayed release tablet: 1 tab(s) orally once a day  azithromycin 250 mg oral tablet: 1 tab(s) orally Monday, Wednesday, and Friday  Breo Ellipta 200 mcg-25 mcg/inh inhalation powder: 1 puff(s) inhaled once a day  cefTRIAXone: 2 gram(s) intravenously once a day.   Until 1/28/22 STARTING TOMORROW  clopidogrel 75 mg oral tablet: 1 tab(s) orally once a day  Incruse Ellipta 62.5 mcg/inh inhalation powder: 1 puff(s) inhaled every 24 hours  Jardiance 25 mg oral tablet: 1 tab(s) orally once a day (in the morning)  losartan 25 mg oral tablet: 1 tab(s) orally once a day  metFORMIN 1000 mg oral tablet: 1 tab(s) orally 2 times a day w/food  please resume the dose on 08/10/2021   metoprolol tartrate 50 mg oral tablet: 1 tab(s) orally 2 times a day  NovoLOG FlexPen 100 units/mL injectable solution: Inject 5 units at BS over 200, 10 units at BS over 300, and 15 units at BS over 400  Nucala 100 mg subcutaneous injection: 100 milligram(s) subcutaneous every 4 weeks    *Last given 12/18/21*  predniSONE 2 mg oral delayed release tablet: 2 tab(s) orally once a day  rosuvastatin 20 mg oral tablet: 1 tab(s) orally once a day (at bedtime)  tamsulosin 0.4 mg oral capsule: 1 cap(s) orally once a day (at bedtime)   apixaban 5 mg oral tablet: 1 tab(s) orally every 12 hours  aspirin 81 mg oral delayed release tablet: 1 tab(s) orally once a day  azithromycin 250 mg oral tablet: 1 tab(s) orally Monday, Wednesday, and Friday  Breo Ellipta 200 mcg-25 mcg/inh inhalation powder: 1 puff(s) inhaled once a day  cefTRIAXone: 2 gram(s) intravenously once a day.   Until 1/28/22 STARTING TOMORROW  clopidogrel 75 mg oral tablet: 1 tab(s) orally once a day  Incruse Ellipta 62.5 mcg/inh inhalation powder: 1 puff(s) inhaled every 24 hours  Jardiance 25 mg oral tablet: 1 tab(s) orally once a day (in the morning)  losartan 25 mg oral tablet: 1 tab(s) orally once a day  metFORMIN 1000 mg oral tablet: 1 tab(s) orally 2 times a day w/food  metoprolol tartrate 50 mg oral tablet: 1 tab(s) orally 2 times a day  NovoLOG FlexPen 100 units/mL injectable solution: Inject 5 units at BS over 200, 10 units at BS over 300, and 15 units at BS over 400  Nucala 100 mg subcutaneous injection: 100 milligram(s) subcutaneous every 4 weeks    *Last given 12/18/21*  predniSONE 2 mg oral delayed release tablet: 1 tab(s) orally once a day  rosuvastatin 20 mg oral tablet: 1 tab(s) orally once a day (at bedtime)  tamsulosin 0.4 mg oral capsule: 1 cap(s) orally once a day (at bedtime)

## 2021-12-18 NOTE — CONSULT NOTE ADULT - ATTENDING COMMENTS
Chart reviewed    Patient seen and examined    Agree with plan as outlined above    80yo male with pmhx DM2, CAD s/p 3v CABG 2004, stents in 2019, HLD, HTN, PAT(on Eliquis), PVD(s/p stents in b/l legs -- right leg in 2020), osteomyelitis, COPD on prn home o2 and nocturnal bipap, type 2 Dm who presented to ED with increasing left leg pain x 1 week.  Now found to have w/ osteomyelitis, concern for intramedullary abscess    Pt has been asymptomatic   -there is no evidence of acute ischemia.  -her ecg is w/o ischemic changes and unchanged when compared to prior ECG   - she has no anginal complaints  -Tele & ECG show ST, pt w/ hx of PAT currently in SR   -there is no evidence for meaningful  volume overload.  -TTE 8/3/21 w/ hypokinesis mild DD no need to repeat  -Cw current cardiac medications    -Pt is hemodynamically stable  -Monitor and replete lytes, keep K>4 and Mg >2  Thank you for the consult  Will continue to follow

## 2021-12-18 NOTE — DISCHARGE NOTE PROVIDER - NSDCCPCAREPLAN_GEN_ALL_CORE_FT
PRINCIPAL DISCHARGE DIAGNOSIS  Diagnosis: Abscess  Assessment and Plan of Treatment:       SECONDARY DISCHARGE DIAGNOSES  Diagnosis: History of COPD  Assessment and Plan of Treatment: You have a known history of chronic obstructive pulmonary disease (COPD). Continue to take your medications Azithromycin and Predniose to prevent exacerbations. Please return to the ED should you have symptoms such as, but not limited to, severe shortness of breath, wheezing, cough, coughing up blood, vomiting associated with cough, chest pain. Follow up with your PCP and/or pulmonologist.    Diagnosis: Hypertension  Assessment and Plan of Treatment: You have a known history of hypertension, the medical term for high blood pressure. Untreated high blood pressure increases the risk of heart failure, heart attack (myocardial infarction), stroke, and kidney failure. Continue to take Metoprolol Tartrate, ***Losartan to prevent the possible complications of uncontrolled hypertension.   Please also follow up with your primary care physician on a regular basis to make sure your blood pressure continues to be well controlled.    Diagnosis: History of BPH  Assessment and Plan of Treatment: You have a known history of benign prostatic hyperplasia, also called "BPH." Continue to take Flomax as prescribed.      Diagnosis: CAD (coronary artery disease)  Assessment and Plan of Treatment: You have a history of Coronary Artery Disease (CAD), also known as heart disease. CAD is a life-long disease and must be treated in order to prevent serious complications, including death. Please continue to take Rosuvastatin, Plavix as prescribed and follow up with your PCP and your cardiologist.      Diagnosis: History of PAT (paroxysmal atrial tachycardia)  Assessment and Plan of Treatment: You have a known history of paroxysmal atrial tachycardia, diagnosed within the last year. Continue to take Eliquis.    Diagnosis: Type 2 diabetes mellitus  Assessment and Plan of Treatment: You have a known history of diabetes prior to your admission. Uncontrolled blood sugar levels can lead to kidney and heart damage, pain/numbness/paralysis in your hands and feel and increased risk of infections. Your hemoglobin A1c on this hospital admission was _____. Continue your diabetes medication Insulin, Jardiance and Metformin to control your blood sugar and prevent the possible complications from this disease. Follow up with your PCP, podiatrist, ophthalmologist on a regular basis.       PRINCIPAL DISCHARGE DIAGNOSIS  Diagnosis: Abscess  Assessment and Plan of Treatment: You came in with Left femur abscess. Abscess is a swollen area within body tissue , containing an accumulation of pus. MRI of left femur with multiloculated rim-enhancing abscess along the anterior + lateral aspect of the mid to distal femur. Rec. f/u MRI w/wo contrast after therapy to r/o underlying mass/neoplasm. Probable intraosseous abscess within mid to proximal femur diaphysis near the superior aspect of the abscess, probable areas of sequestrum, involucrum, and cloaca formation when correlated with CT. You went for IR drainage of abscess. You were given antibiotics and sent home on antibiotics to continue. Follow up with your primary care physician with in 1 week of discharge. Follow up with Ortho and ID after discharge.      SECONDARY DISCHARGE DIAGNOSES  Diagnosis: Type 2 diabetes mellitus  Assessment and Plan of Treatment: You have a known history of diabetes prior to your admission. Uncontrolled blood sugar levels can lead to kidney and heart damage, pain/numbness/paralysis in your hands and feel and increased risk of infections. Your hemoglobin A1c on this hospital admission was  7.6. Continue your diabetes medication Insulin, Jardiance and Metformin to control your blood sugar and prevent the possible complications from this disease. Follow up with your PCP, podiatrist, ophthalmologist on a regular basis.      Diagnosis: History of COPD  Assessment and Plan of Treatment: You have a known history of chronic obstructive pulmonary disease (COPD). Continue to take your medications Azithromycin and Predniose to prevent exacerbations. Please return to the ED should you have symptoms such as, but not limited to, severe shortness of breath, wheezing, cough, coughing up blood, vomiting associated with cough, chest pain. Follow up with your PCP and/or pulmonologist.    Diagnosis: Hypertension  Assessment and Plan of Treatment: You have a known history of hypertension, the medical term for high blood pressure. Untreated high blood pressure increases the risk of heart failure, heart attack (myocardial infarction), stroke, and kidney failure. Continue to take Metoprolol Tartrate, Losartan to prevent the possible complications of uncontrolled hypertension.   Please also follow up with your primary care physician on a regular basis to make sure your blood pressure continues to be well controlled.    Diagnosis: CAD (coronary artery disease)  Assessment and Plan of Treatment: You have a history of Coronary Artery Disease (CAD), also known as heart disease. CAD is a life-long disease and must be treated in order to prevent serious complications, including death. Please continue to take Rosuvastatin, Plavix as prescribed and follow up with your PCP and your cardiologist.      Diagnosis: History of BPH  Assessment and Plan of Treatment: You have a known history of benign prostatic hyperplasia, also called "BPH." Continue to take Flomax as prescribed.      Diagnosis: History of PAT (paroxysmal atrial tachycardia)  Assessment and Plan of Treatment: You have a known history of paroxysmal atrial tachycardia, diagnosed within the last year. Continue to take Eliquis.

## 2021-12-18 NOTE — PATIENT PROFILE ADULT - FALL HARM RISK - HARM RISK INTERVENTIONS

## 2021-12-18 NOTE — CONSULT NOTE ADULT - SUBJECTIVE AND OBJECTIVE BOX
Patient is a 81yMale community ambulator with a cane who presents to [place] ED w/ a c/o of increasing left thigh pain.  Patient has extensive history, with original mid shaft femur fracture in 1968, treated with external fixator. Patient developed an infection at that time and was tread with antibiotics. Patient had further intervention in 2010, when he was seen at [facility]. Patient underwent irrigation and debridements x2 and had an IR drain placed to drain an abscess superimposed on an osteomyelitis.     Patient presents with increasing pain in left femur. States it had started a few weeks prior, but was intermittent and not as painful as in the past week. Endorses mild local trauma, but no falls. Describes his pain as worse with ambulation.    In the ED cbc, cmp, coags, lacate,  CT femur , LLE duplex and covid pcr were obtained. significant for bun/cr 26/1.6, glucose 162, album 2.9, ca 10.2, lactate 1.9. covid pcr negative. US No evidence of left lower extremity deep venous thrombosis. CT revealed Old healed deformity of the mid left femur, Complex heterogeneous lobulated soft tissue anterior and lateral to the middle two thirds of the left femur concerning for an abscess. New lobulated intramedullary defect with tract extending to the cortical surface in the mid left femur concerning for a possible intramedullary abscess.   Pt was given vanc & zosyn x 1, tylenol 650mg x 1.       Denies any numbness or tingling. Denies having any other pain elsewhere. No other orthopedic concerns at this time.      Diabetes Mellitus, Type II    CAD (Coronary Artery Disease)    Dyslipidemia    Asymptomatic Peripheral Vascular Disease    Osteomyelitis    COPD (chronic obstructive pulmonary disease)    Diabetes mellitus    Hypertension    PVD (peripheral vascular disease)    History of PAT (paroxysmal atrial tachycardia)            No Known Allergies  shellfish (Nausea)      PHYSICAL EXAM:  T(C): 36.9 (12-18-21 @ 12:13), Max: 37.1 (12-18-21 @ 08:34)  HR: 85 (12-18-21 @ 12:13) (78 - 104)  BP: 135/65 (12-18-21 @ 12:13) (116/67 - 135/65)  RR: 18 (12-18-21 @ 12:13) (18 - 18)  SpO2: 100% (12-18-21 @ 12:13) (96% - 100%)    Gen: NAD, Resting comfortably    LLE  antereolateral surgical scar, well healed. Defect in muscle noted, no drainage. No defect in skin, no clear sinus tract visualized  No bony tenderness to palpation  +EHL/FHL/TA/GSC  +SILT L3-S1  + DP  Compartments soft and compressible  No calf tenderness    Secondary Survey:   No TTP over bony prominences, SILT, palpable pulses, full/painless A/PROM, compartments soft. No TTP over spinous processes or paraspinal muscles at C/T/L spine. No palpable step off. No other injuries or complaints.      A/P: 81M/F w/ osteomyelitis, concern for intramedullary abscess    Patient's vitals are stable, no indication of sepsis  To further assess fluid collection/abscess, recommend  MR w and wo to further assess the abscess. Possibly could be drained by IR, but may require surgical I&D    Analgesia  NWB  DVT ppx with scds for now, possible surgical intervention needed  Discussed with attending who is in agreement with above plan   Patient is a 81yMale community ambulator with a cane who presents to [place] ED w/ a c/o of increasing left thigh pain.  Patient has extensive history, with original mid shaft femur fracture in 1968, treated with external fixator. Patient developed an infection at that time and was tread with antibiotics. Patient had further intervention in 2010, when he was seen at [facility]. Patient underwent irrigation and debridements x2 and had an IR drain placed to drain an abscess superimposed on an osteomyelitis.     Patient presents with increasing pain in left femur. States it had started a few weeks prior, but was intermittent and not as painful as in the past week. Endorses mild local trauma, but no falls. Describes his pain as worse with ambulation.    In the ED cbc, cmp, coags, lacate,  CT femur , LLE duplex and covid pcr were obtained. significant for bun/cr 26/1.6, glucose 162, album 2.9, ca 10.2, lactate 1.9. covid pcr negative. US No evidence of left lower extremity deep venous thrombosis. CT revealed Old healed deformity of the mid left femur, Complex heterogeneous lobulated soft tissue anterior and lateral to the middle two thirds of the left femur concerning for an abscess. New lobulated intramedullary defect with tract extending to the cortical surface in the mid left femur concerning for a possible intramedullary abscess.   Pt was given vanc & zosyn x 1, tylenol 650mg x 1.       Denies any numbness or tingling. Denies having any other pain elsewhere. No other orthopedic concerns at this time.      Diabetes Mellitus, Type II    CAD (Coronary Artery Disease)    Dyslipidemia    Asymptomatic Peripheral Vascular Disease    Osteomyelitis    COPD (chronic obstructive pulmonary disease)    Diabetes mellitus    Hypertension    PVD (peripheral vascular disease)    History of PAT (paroxysmal atrial tachycardia)            No Known Allergies  shellfish (Nausea)      PHYSICAL EXAM:  T(C): 36.9 (12-18-21 @ 12:13), Max: 37.1 (12-18-21 @ 08:34)  HR: 85 (12-18-21 @ 12:13) (78 - 104)  BP: 135/65 (12-18-21 @ 12:13) (116/67 - 135/65)  RR: 18 (12-18-21 @ 12:13) (18 - 18)  SpO2: 100% (12-18-21 @ 12:13) (96% - 100%)    Gen: NAD, Resting comfortably    LLE  antereolateral surgical scar, well healed. Defect in muscle noted, no drainage. No defect in skin, no clear sinus tract visualized  No bony tenderness to palpation  +EHL/FHL/TA/GSC  +SILT L3-S1  + DP  Compartments soft and compressible  No calf tenderness    Secondary Survey:   No TTP over bony prominences, SILT, palpable pulses, full/painless A/PROM, compartments soft. No TTP over spinous processes or paraspinal muscles at C/T/L spine. No palpable step off. No other injuries or complaints.      A/P: 81M/F w/ osteomyelitis, concern for intramedullary abscess    Patient's vitals are stable, no indication of sepsis  To further assess fluid collection/abscess, recommend  MR w and wo of left femur to further assess the abscess. Possibly could be drained by IR, but may require surgical I&D  Will monitor clinical course and determine follow up with primary surgeon  Analgesia  NWB LLE  DVT ppx with scds for now, possible surgical intervention needed  Discussed with attending who is in agreement with above plan   Patient is a 81yMale community ambulator with a cane who presents to [place] ED w/ a c/o of increasing left thigh pain.  Patient has extensive history, with original mid shaft femur fracture in 1968, treated with external fixator and intramedullary nail. Patient developed an infection in 2005 and was treated with antibiotics. Patient had further intervention in 2010, when he was seen at [facility]. Patient underwent irrigation and debridements x2 and had an IR drain placed to drain an abscess superimposed on an osteomyelitis.     Patient presents with increasing pain in left femur. States it had started a few weeks prior, but was intermittent and not as painful as in the past week. Endorses mild local trauma, but no falls. Describes his pain as worse with ambulation.    In the ED cbc, cmp, coags, lacate,  CT femur , LLE duplex and covid pcr were obtained. significant for bun/cr 26/1.6, glucose 162, album 2.9, ca 10.2, lactate 1.9. covid pcr negative. US No evidence of left lower extremity deep venous thrombosis. CT revealed Old healed deformity of the mid left femur, Complex heterogeneous lobulated soft tissue anterior and lateral to the middle two thirds of the left femur concerning for an abscess. New lobulated intramedullary defect with tract extending to the cortical surface in the mid left femur concerning for a possible intramedullary abscess.   Pt was given vanc & zosyn x 1, tylenol 650mg x 1.       Denies any numbness or tingling. Denies having any other pain elsewhere. No other orthopedic concerns at this time.      Diabetes Mellitus, Type II    CAD (Coronary Artery Disease)    Dyslipidemia    Asymptomatic Peripheral Vascular Disease    Osteomyelitis    COPD (chronic obstructive pulmonary disease)    Diabetes mellitus    Hypertension    PVD (peripheral vascular disease)    History of PAT (paroxysmal atrial tachycardia)            No Known Allergies  shellfish (Nausea)      PHYSICAL EXAM:  T(C): 36.9 (12-18-21 @ 12:13), Max: 37.1 (12-18-21 @ 08:34)  HR: 85 (12-18-21 @ 12:13) (78 - 104)  BP: 135/65 (12-18-21 @ 12:13) (116/67 - 135/65)  RR: 18 (12-18-21 @ 12:13) (18 - 18)  SpO2: 100% (12-18-21 @ 12:13) (96% - 100%)    Gen: NAD, Resting comfortably    LLE  antereolateral surgical scar, well healed. Defect in muscle noted, no drainage. No defect in skin, no clear sinus tract visualized  No bony tenderness to palpation  +EHL/FHL/TA/GSC  +SILT L3-S1  + DP  Compartments soft and compressible  No calf tenderness    Secondary Survey:   No TTP over bony prominences, SILT, palpable pulses, full/painless A/PROM, compartments soft. No TTP over spinous processes or paraspinal muscles at C/T/L spine. No palpable step off. No other injuries or complaints.      A/P: 81M/F w/ osteomyelitis, concern for intramedullary abscess    Patient's vitals are stable, no indication of sepsis  To further assess fluid collection/abscess, recommend  MR w and wo of left femur to further assess the abscess. Possibly could be drained by IR, but may require surgical I&D  Will monitor clinical course and determine follow up with primary surgeon  Analgesia  NWB LLE  DVT ppx with scds for now, possible surgical intervention needed  Discussed with attending who is in agreement with above plan   Patient is a 81yMale community ambulator with a cane who presents to [place] ED w/ a c/o of increasing left thigh pain.  Patient has extensive history, with original mid shaft femur fracture in 1968, treated with external fixator and intramedullary nail. Patient developed an infection in 2005 and was treated with antibiotics. Patient had further intervention in 2010, when he was seen at [facility]. Patient underwent irrigation and debridements x2 and had an IR drain placed to drain an abscess superimposed on an osteomyelitis.     Patient presents with increasing pain in left femur. States it had started a few weeks prior, but was intermittent and not as painful as in the past week. Endorses minimal trauma to area, and no falls. Describes his pain as worse with ambulation.    In the ED cbc, cmp, coags, lacate,  CT femur , LLE duplex and covid pcr were obtained. significant for bun/cr 26/1.6, glucose 162, album 2.9, ca 10.2, lactate 1.9. covid pcr negative. US No evidence of left lower extremity deep venous thrombosis. CT revealed Old healed deformity of the mid left femur, Complex heterogeneous lobulated soft tissue anterior and lateral to the middle two thirds of the left femur concerning for an abscess. New lobulated intramedullary defect with tract extending to the cortical surface in the mid left femur concerning for a possible intramedullary abscess.   Pt was given vanc & zosyn x 1, tylenol 650mg x 1.       Denies any numbness or tingling. Denies having any other pain elsewhere. No other orthopedic concerns at this time.      Diabetes Mellitus, Type II    CAD (Coronary Artery Disease)    Dyslipidemia    Asymptomatic Peripheral Vascular Disease    Osteomyelitis    COPD (chronic obstructive pulmonary disease)    Diabetes mellitus    Hypertension    PVD (peripheral vascular disease)    History of PAT (paroxysmal atrial tachycardia)            No Known Allergies  shellfish (Nausea)      PHYSICAL EXAM:  T(C): 36.9 (12-18-21 @ 12:13), Max: 37.1 (12-18-21 @ 08:34)  HR: 85 (12-18-21 @ 12:13) (78 - 104)  BP: 135/65 (12-18-21 @ 12:13) (116/67 - 135/65)  RR: 18 (12-18-21 @ 12:13) (18 - 18)  SpO2: 100% (12-18-21 @ 12:13) (96% - 100%)    Gen: NAD, Resting comfortably    LLE  antereolateral surgical scar, well healed. Defect in muscle noted, no drainage. No defect in skin, no clear sinus tract visualized  No bony tenderness to palpation  +EHL/FHL/TA/GSC  +SILT L3-S1  + DP  Compartments soft and compressible  No calf tenderness    Secondary Survey:   No TTP over bony prominences, SILT, palpable pulses, full/painless A/PROM, compartments soft. No TTP over spinous processes or paraspinal muscles at C/T/L spine. No palpable step off. No other injuries or complaints.      A/P: 81M/F w/ osteomyelitis, concern for intramedullary abscess    Patient's vitals are stable, no indication of sepsis  To further assess fluid collection/abscess, recommend  MR w and wo of left femur to further assess the abscess. Possibly could be drained by IR, but may require surgical I&D  Will monitor clinical course and determine follow up with primary surgeon  Analgesia  NWB LLE  DVT ppx with scds for now, possible surgical intervention needed  Discussed with attending who is in agreement with above plan   Patient is a 81yMale community ambulator with a cane who presents to [place] ED w/ a c/o of increasing left thigh pain.  Patient has extensive history, with original mid shaft femur fracture in 1968, treated with external fixator and intramedullary nail. Patient developed an infection in 2005 and was treated with antibiotics. Patient had further intervention in 2010, when he was seen at [facility]. Patient underwent irrigation and debridements x2 and had an IR drain placed to drain an abscess superimposed on an osteomyelitis.     Patient presents with increasing pain in left femur. States it had started a few weeks prior, but was intermittent and not as painful as in the past week. Endorses minimal trauma to area, and no falls. Describes his pain as worse with ambulation.    In the ED cbc, cmp, coags, lacate,  CT femur , LLE duplex and covid pcr were obtained. significant for bun/cr 26/1.6, glucose 162, album 2.9, ca 10.2, lactate 1.9. covid pcr negative. US No evidence of left lower extremity deep venous thrombosis. CT revealed Old healed deformity of the mid left femur, Complex heterogeneous lobulated soft tissue anterior and lateral to the middle two thirds of the left femur concerning for an abscess. New lobulated intramedullary defect with tract extending to the cortical surface in the mid left femur concerning for a possible intramedullary abscess.   Pt was given vanc & zosyn x 1, tylenol 650mg x 1.       Denies any numbness or tingling. Denies having any other pain elsewhere. No other orthopedic concerns at this time.      Diabetes Mellitus, Type II    CAD (Coronary Artery Disease)    Dyslipidemia    Asymptomatic Peripheral Vascular Disease    Osteomyelitis    COPD (chronic obstructive pulmonary disease)    Diabetes mellitus    Hypertension    PVD (peripheral vascular disease)    History of PAT (paroxysmal atrial tachycardia)            No Known Allergies  shellfish (Nausea)      PHYSICAL EXAM:  T(C): 36.9 (12-18-21 @ 12:13), Max: 37.1 (12-18-21 @ 08:34)  HR: 85 (12-18-21 @ 12:13) (78 - 104)  BP: 135/65 (12-18-21 @ 12:13) (116/67 - 135/65)  RR: 18 (12-18-21 @ 12:13) (18 - 18)  SpO2: 100% (12-18-21 @ 12:13) (96% - 100%)    Gen: NAD, Resting comfortably    LLE  antereolateral surgical scar, well healed. Defect in muscle noted, no drainage. No defect in skin, no clear sinus tract visualized  No bony tenderness to palpation  +EHL/FHL/TA/GSC  +SILT L3-S1  + DP  Compartments soft and compressible  No calf tenderness    Secondary Survey:   No TTP over bony prominences, SILT, palpable pulses, full/painless A/PROM, compartments soft. No TTP over spinous processes or paraspinal muscles at C/T/L spine. No palpable step off. No other injuries or complaints.      A/P: 81M/F w/ osteomyelitis, concern for intramedullary abscess    Patient's vitals are stable, no indication of sepsis  To further assess fluid collection/abscess, recommend  MR w and wo of left femur to further assess the abscess. Possibly could be drained by IR, but may require surgical I&D  Will monitor clinical course   Analgesia  NWB LLE  DVT ppx with scds for now, possible surgical intervention needed  Discussed with attending who is in agreement with above plan

## 2021-12-18 NOTE — DISCHARGE NOTE PROVIDER - CARE PROVIDERS DIRECT ADDRESSES
[email or web address]

## 2021-12-18 NOTE — DISCHARGE NOTE PROVIDER - NSDCFUADDAPPT_GEN_ALL_CORE_FT
Follow up with your PCP within 1 week pf discharge. Follow up with your PCP within 1 week of discharge.

## 2021-12-18 NOTE — H&P ADULT - NSICDXPASTMEDICALHX_GEN_ALL_CORE_FT
PAST MEDICAL HISTORY:  CAD (Coronary Artery Disease) s/p 3v CABG 2004; stents placed in [place] in 2019    COPD (chronic obstructive pulmonary disease) on 2L at home and BiPAP at night; intubated 6/18    Diabetes Mellitus, Type II     Dyslipidemia     Hypertension     Osteomyelitis     PVD (peripheral vascular disease)      PAST MEDICAL HISTORY:  CAD (Coronary Artery Disease) s/p 3v CABG 2004; stents placed in [place] in 2019    COPD (chronic obstructive pulmonary disease) on 2L at home and BiPAP at night; intubated 6/18    Diabetes Mellitus, Type II     Dyslipidemia     History of PAT (paroxysmal atrial tachycardia)     Hypertension     Osteomyelitis     PVD (peripheral vascular disease)

## 2021-12-18 NOTE — ED ADULT NURSE REASSESSMENT NOTE - NSIMPLEMENTINTERV_GEN_ALL_ED
Implemented All Fall with Harm Risk Interventions:  [place] to call system. Call bell, personal items and telephone within reach. Instruct patient to call for assistance. Room bathroom lighting operational. Non-slip footwear when patient is off stretcher. Physically safe environment: no spills, clutter or unnecessary equipment. Stretcher in lowest position, wheels locked, appropriate side rails in place. Provide visual cue, wrist band, yellow gown, etc. Monitor gait and stability. Monitor for mental status changes and reorient to person, place, and time. Review medications for side effects contributing to fall risk. Reinforce activity limits and safety measures with patient and family. Provide visual clues: red socks.

## 2021-12-18 NOTE — DISCHARGE NOTE PROVIDER - NSDCFUADDINST_GEN_ALL_CORE_FT
Follow up with Interventional Radiology on January 6th at 9AM for drain removal  Please have COVID 19 PCR on January 3rd.

## 2021-12-18 NOTE — H&P ADULT - ASSESSMENT
82 yo m pmh CAD s/p stent, HLD, HTN, PVD, COPD on prn home o2 and nocturnal bipap, type 2 Dm presents to ED with leg pain x 1 week , found to have CT findings c/w abscess in left femur     # Intramedullary Abscess   - pt with h/o compound fracture/osteomyelitis of left femur. now p/w left leg pain.   - CT revealed Old healed deformity of the mid left femur , Complex heterogeneous lobulated soft  tissue anterior and lateral to the middle two thirds of the left femur   concerning for an abscess. New lobulated intramedullary defect with tract   extending to the cortical surface in the mid left femur concerning for a   possible intramedullary abscess.   - Doppler negative for DVT  - Afebrile, no leukocytosis. monitor for fever. trend daily cbc with diff   - s/p vancomycin & Zosyn x 1 continue   - imaging studies performed non contrast due to pt renal function. will likely require additional imaging. awaiting ortho recs   - Ortho consulted-- will see patient in am , recommend ID evaluation   - consult ID Dr. العلي     #MERRILL on CKD Stage..   - Bun/Cr :  The patient is an 80yo male with pmhx CAD s/p 3v CABG 2004, stents in 2019, HLD, HTN, PAT(on Eliquis), PVD(s/p stents in b/l legs -- right leg in 2020), osteomyelitis, COPD on prn home o2 and nocturnal bipap, type 2 Dm  presents to ED with leg pain x 1 week , found to have CT findings c/w abscess in left femur

## 2021-12-18 NOTE — ED ADULT NURSE REASSESSMENT NOTE - NS ED NURSE REASSESS COMMENT FT1
Received pt alert and orientated. Pt denies SOB abd pain and chest pain. Pt denies pain. Call bell within reach. Freq rounding performed. Will continue to monitor. Safety/Comfort maintained. Pt is awaiting a bed. Pt was taken off BIPAP and put on NC 2L now. Pt is awaiting a bed upstairs.

## 2021-12-18 NOTE — H&P ADULT - NSHPREVIEWOFSYSTEMS_GEN_ALL_CORE
CONSTITUTIONAL: denies fever, chills, fatigue, weakness  HEENT: denies blurred vision, sore throat  SKIN: denies new lesions, rash  CARDIOVASCULAR: denies chest pain, chest pressure, palpitations  RESPIRATORY: denies shortness of breath, sputum production  GASTROINTESTINAL: denies nausea, vomiting, diarrhea, abdominal pain  GENITOURINARY: denies dysuria, discharge  NEUROLOGICAL: denies numbness, headache, focal weakness  MUSCULOSKELETAL: Admits left outer thigh pain, denies decrease ROM  HEMATOLOGIC: denies gross bleeding, bruising  LYMPHATICS: denies enlarged lymph nodes, extremity swelling

## 2021-12-18 NOTE — CONSULT NOTE ADULT - SUBJECTIVE AND OBJECTIVE BOX
NEPHROLOGY CONSULTATION    CHIEF COMPLAINT:  CKD    HPI:  Comes to ER because of increasing left leg pain x 1 week and shown to have probable abscess around left mid femur.  He has CKD with baseline Cr that varies 1.2-1.5 mg/dL.  States he has controlled HTN.    ROS:  no SOB, CP, cough, fever, diarrhea, vomiting    PAST MEDICAL & SURGICAL HISTORY:  Diabetes Mellitus, Type II  CAD (Coronary Artery Disease)  s/p 3v CABG 2004; stents placed in [place] in 2019  Dyslipidemi  Osteomyelitis  COPD (chronic obstructive pulmonary disease)  on 2L at home and BiPAP at night; intubated 6/18  Hypertension  PVD (peripheral vascular disease)  History of PAT (paroxysmal atrial tachycardia)  CABG (Coronary Artery Bypass Graft)  2009  Compound fracture of left leg  S/P primary angioplasty with coronary stent        SOCIAL HISTORY:  non smoker    FAMILY HISTORY:  no CKD      MEDICATIONS  (STANDING):  atorvastatin 80 milliGRAM(s) Oral at bedtime  azithromycin   Tablet 250 milliGRAM(s) Oral <User Schedule>  budesonide 160 MICROgram(s)/formoterol 4.5 MICROgram(s) Inhaler 2 Puff(s) Inhalation two times a day  cefepime   IVPB 2000 milliGRAM(s) IV Intermittent every 12 hours  clopidogrel Tablet 75 milliGRAM(s) Oral daily  dextrose 40% Gel 15 Gram(s) Oral once  dextrose 5%. 1000 milliLiter(s) (50 mL/Hr) IV Continuous <Continuous>  dextrose 5%. 1000 milliLiter(s) (100 mL/Hr) IV Continuous <Continuous>  dextrose 50% Injectable 25 Gram(s) IV Push once  dextrose 50% Injectable 12.5 Gram(s) IV Push once  dextrose 50% Injectable 25 Gram(s) IV Push once  glucagon  Injectable 1 milliGRAM(s) IntraMuscular once  insulin lispro (ADMELOG) corrective regimen sliding scale   SubCutaneous three times a day before meals  insulin lispro (ADMELOG) corrective regimen sliding scale   SubCutaneous at bedtime  metoprolol tartrate 50 milliGRAM(s) Oral two times a day  predniSONE   Tablet 4 milliGRAM(s) Oral daily  sodium chloride 0.9%. 1000 milliLiter(s) (60 mL/Hr) IV Continuous <Continuous>  tamsulosin 0.4 milliGRAM(s) Oral at bedtime  tiotropium 18 MICROgram(s) Capsule 1 Capsule(s) Inhalation daily  vancomycin  IVPB 1500 milliGRAM(s) IV Intermittent every 24 hours      PHYSICAL EXAMINATION:  T(F): 98.5 (12-18-21 @ 12:13)  HR: 85 (12-18-21 @ 12:13)  BP: 135/65 (12-18-21 @ 12:13)  RR: 18 (12-18-21 @ 12:13)  SpO2: 100% (12-18-21 @ 12:13)  Conversant, no apparent distress  PERRLA, pink conjunctivae, no ptosis  Good dentition, no pharyngeal erythema  Neck non tender, no mass, no thyromegaly or nodules  Normal respiratory effort, lungs clear to auscultation  Heart with RRR, no murmurs or rubs, no peripheral edema  Abdomen soft, no masses, no organomegaly  Skin no rashes, ulcers or lesions, normal turgor and temperature  Appropriate affect, AO x 3    LABS:                        11.1   7.49  )-----------( 334      ( 18 Dec 2021 04:53 )             35.1     12-18    139  |  108  |  25<H>  ----------------------------<  153<H>  4.6   |  21<L>  |  1.50<H>    Ca    9.2      18 Dec 2021 13:41    TPro  7.1  /  Alb  2.5<L>  /  TBili  0.5  /  DBili  x   /  AST  16  /  ALT  11<L>  /  AlkPhos  90  12-18        RADIOLOGY:  Chest X-Ray personally reviewed and shows NAPD      ASSESSMENT:  1.  Azotemia, mostly CKD but cannot rule out mild, superimposed prerenal state    PLAN:  Empiric hydration x 24 hrs  Hold ARB  BMP in AM

## 2021-12-18 NOTE — H&P ADULT - PROBLEM SELECTOR PLAN 3
- chronic  - on home 2L NC PRN; continue as needed  - BIPAP at night; will continue with home settings  - c/w home azithro M, W, F  - c/w home montelukast  - Pt on home breo ellipta and Incruse ellipta -- therapeutic interchange with Symbicort and tiotropium

## 2021-12-18 NOTE — CONSULT NOTE ADULT - SUBJECTIVE AND OBJECTIVE BOX
[facility]  INFECTIOUS DISEASES   [address] [place] [address]  Tel: [phone]     Fax: [phone]  ======================================================  MD Noman Perry Kaushal, MD Cho, Michelle, MD   ======================================================    [id number]  [facility]     CC: Left leg pain   HPI:  82yo man with PMH of HTN, COPD on home O2, CAD s/p CABG, PVD s/p stents in b/l legs and left femoral fracture more than 50 years ago, was admitted with left leg pain on the site of old fracture after CT showed possible intramedullary abscess. He has had pain and infection in the old fracture area at least 3-4 times in the past, had multiple surgeries and drainage of area with a long term antibiotic treatment, last one years ago.  He always has discomfort in that leg abut in last few days pain is more constant and severe. He doesn't have a very active life due to COPD but ambulates with cane due to pain.   In ED he has a CT that showed possible intramedullary abscess and started on zosyn and vancomycin.     PAST MEDICAL & SURGICAL HISTORY:  Diabetes Mellitus, Type II  CAD (Coronary Artery Disease)  s/p 3v CABG ; stents placed [place] in [place]  Dyslipidemia  Osteomyelitis  COPD (chronic obstructive pulmonary disease)  on 2L at home and BiPAP at night; intubated   Hypertension  PVD (peripheral vascular disease)  History of PAT (paroxysmal atrial tachycardia)  CABG (Coronary Artery Bypass Graft)    Compound fracture  left leg  S/P primary angioplasty with coronary stent        Social Hx: Former smoker, no ETOH or drugs     FAMILY HISTORY:  Family history of diabetes mellitus (Sibling)  Family hx of lung cancer  brother,  age 82, used to smoke with pt  Allergies  No Known Allergies    Intolerances  shellfish (Nausea)    Antibiotics:  MEDICATIONS  (STANDING):  atorvastatin 80 milliGRAM(s) Oral at bedtime  azithromycin   Tablet 250 milliGRAM(s) Oral <User Schedule>  budesonide 160 MICROgram(s)/formoterol 4.5 MICROgram(s) Inhaler 2 Puff(s) Inhalation two times a day  cefepime   IVPB 2000 milliGRAM(s) IV Intermittent every 12 hours  clopidogrel Tablet 75 milliGRAM(s) Oral daily  dextrose 40% Gel 15 Gram(s) Oral once  dextrose 5%. 1000 milliLiter(s) (50 mL/Hr) IV Continuous <Continuous>  dextrose 5%. 1000 milliLiter(s) (100 mL/Hr) IV Continuous <Continuous>  dextrose 50% Injectable 25 Gram(s) IV Push once  dextrose 50% Injectable 12.5 Gram(s) IV Push once  dextrose 50% Injectable 25 Gram(s) IV Push once  glucagon  Injectable 1 milliGRAM(s) IntraMuscular once  insulin lispro (ADMELOG) corrective regimen sliding scale   SubCutaneous three times a day before meals  insulin lispro (ADMELOG) corrective regimen sliding scale   SubCutaneous at bedtime  metoprolol tartrate 50 milliGRAM(s) Oral two times a day  predniSONE   Tablet 4 milliGRAM(s) Oral daily  sodium chloride 0.9%. 1000 milliLiter(s) (60 mL/Hr) IV Continuous <Continuous>  tamsulosin 0.4 milliGRAM(s) Oral at bedtime  tiotropium 18 MICROgram(s) Capsule 1 Capsule(s) Inhalation daily  vancomycin  IVPB 1500 milliGRAM(s) IV Intermittent every 24 hours     REVIEW OF SYSTEMS:  CONSTITUTIONAL:  No Fever or chills  HEENT:  No diplopia or blurred vision.  No sore throat or runny nose.  CARDIOVASCULAR:  No chest pain or SOB.  RESPIRATORY:  No cough, shortness of breath, PND or orthopnea.  GASTROINTESTINAL:  No nausea, vomiting or diarrhea.  GENITOURINARY:  No dysuria, frequency or urgency. No Blood in urine  MUSCULOSKELETAL: left leg pain   SKIN:  No change in skin, hair or nails.  NEUROLOGIC:  No paresthesias, fasciculations, seizures or weakness.  PSYCHIATRIC:  No disorder of thought or mood.  ENDOCRINE:  No heat or cold intolerance, polyuria or polydipsia.  HEMATOLOGICAL:  No easy bruising or bleeding.     Physical Exam:  Vital Signs Last 24 Hrs  T(C): 37.1 (18 Dec 2021 08:34), Max: 37.1 (18 Dec 2021 08:34)  T(F): 98.8 (18 Dec 2021 08:34), Max: 98.8 (18 Dec 2021 08:34)  HR: 78 (18 Dec 2021 08:34) (78 - 104)  BP: 120/67 (18 Dec 2021 08:34) (116/67 - 120/80)  BP(mean): --  RR: 18 (18 Dec 2021 08:34) (18 - 18)  SpO2: 100% (18 Dec 2021 08:34) (96% - 100%)  Height (cm): 180.3 ( @ 15:01)  Weight (kg): 97.5 ( @ 15:01)  BMI (kg/m2): 30 ( @ 15:01)  BSA (m2): 2.17 ( @ 15:01)  GEN: NAD  HEENT: normocephalic and atraumatic. EOMI. PERRL.    NECK: Supple.  No lymphadenopathy   LUNGS: Clear to auscultation.  HEART: Regular rate and rhythm without murmur.  ABDOMEN: Soft, nontender, and nondistended.  Positive bowel sounds.    : No CVA tenderness  EXTREMITIES: Without any cyanosis, clubbing, rash, lesions or edema.  NEUROLOGIC: grossly intact.  PSYCHIATRIC: Appropriate affect .  SKIN: No ulceration or induration present.    Labs:      138  |  103  |  26<H>  ----------------------------<  162<H>  5.0   |  28  |  1.60<H>    Ca    10.2<H>      17 Dec 2021 17:43    TPro  7.7  /  Alb  2.9<L>  /  TBili  0.2  /  DBili  x   /  AST  15  /  ALT  13  /  AlkPhos  102                          11.1   7.49  )-----------( 334      ( 18 Dec 2021 04:53 )             35.1     PT/INR - ( 18 Dec 2021 04:53 )   PT: 13.4 sec;   INR: 1.15 ratio    PTT - ( 18 Dec 2021 04:53 )  PTT:36.9 sec    LIVER FUNCTIONS - ( 17 Dec 2021 17:43 )  Alb: 2.9 g/dL / Pro: 7.7 g/dL / ALK PHOS: 102 U/L / ALT: 13 U/L / AST: 15 U/L / GGT: x           COVID-19 PCR: NotDetec (21 @ 17:43)    All imaging and other data have been reviewed.  < from: CT Femur No Cont, Left (21 @ 22:24) >  IMPRESSION:  Old healed deformity of the mid left femur likely correlating with the   history of a prior osteomyelitis. Complex heterogeneous lobulated soft   tissue anterior and lateral to the middle two thirds of the left femur   concerning for an abscess. New lobulated intramedullary defect with tract   extending to the cortical surface in the mid left femur concerning for a   possible intramedullary abscess. A contrast-enhancedMRI of the left   femur is recommended for further evaluation.    Assessment and Plan:   82yo man with PMH of HTN, COPD on home O2, CAD s/p CABG, PVD s/p stents in b/l legs and left femoral fracture more than 50 years ago, was admitted with left leg pain on the site of old fracture after CT showed possible intramedullary abscess. He has had pain and infection in the old fracture area at least 3-4 times in the past, had multiple surgeries and drainage of area with a long term antibiotic treatment, last one years ago.   Most likely another infection and osteomyelitis with collection in area that needs intervention by ortho. Will cover empirically with 4th Gen cephalosporins and vancomycin until we have culture info.   Doppler neg for DVTs  Admission WBC normal, no fever    1- Left femoral OM and intramedullary abscess   - Blood cultures are testing will follow   - Ortho consult for possible intervention to drain the abscess, please send for cultures and pathology   - ESR and CRP for baseline  - Stop zosyn and start cefepime 2gm q12 adjusted for renal function   - Continue vancomycin 1500mg daily, will send trough before 4th dose and keep 15 to 20  - Follow creatinine to adjust ABx doses, mild MERRILL/CKD    2- COPD  - Continue azithromycin 250mg 3times weekly prophylactically   - Pulmonary consult noted  - On o2 and BiPAP at night time      Thank you for courtesy of this consult.     Will follow.  Discussed with the primary team.     Brandi العلي MD  Division of Infectious Diseases   Cell [phone] between 8am and 6pm   After 6pm and weekends please call ID service at [phone].     40 minutes spent on total encounter assessing patient, examination, chart reivew, counseling and coordinating care by the attending physician/nurse/care manager.   [facility]  INFECTIOUS DISEASES   [address] [place] [address]  Tel: [phone]     Fax: [phone]  ======================================================  MD Noman Perry Kaushal, MD Cho, Michelle, MD   ======================================================    [id number]  [facility]     CC: Left leg pain   HPI:  82yo man with PMH of HTN, COPD on home O2, CAD s/p CABG, PVD s/p stents in b/l legs and left femoral fracture more than 50 years ago, was admitted with left leg pain on the site of old fracture after CT showed possible intramedullary abscess. He has had pain and infection in the old fracture area at least 3-4 times in the past, had multiple surgeries and drainage of area with a long term antibiotic treatment, last one years ago.  He always has discomfort in that leg abut in last few days pain is more constant and severe. He doesn't have a very active life due to COPD but ambulates with cane due to pain.   In ED he has a CT that showed possible intramedullary abscess and started on zosyn and vancomycin.     PAST MEDICAL & SURGICAL HISTORY:  Diabetes Mellitus, Type II  CAD (Coronary Artery Disease)  s/p 3v CABG ; stents placed [place] in [place]  Dyslipidemia  Osteomyelitis  COPD (chronic obstructive pulmonary disease)  on 2L at home and BiPAP at night; intubated   Hypertension  PVD (peripheral vascular disease)  History of PAT (paroxysmal atrial tachycardia)  CABG (Coronary Artery Bypass Graft)    Compound fracture  left leg  S/P primary angioplasty with coronary stent    Social Hx: Former smoker, no ETOH or drugs     FAMILY HISTORY:  Family history of diabetes mellitus (Sibling)  Family hx of lung cancer  brother,  age 82, used to smoke with pt  Allergies  No Known Allergies    Intolerances  shellfish (Nausea)    Antibiotics:  MEDICATIONS  (STANDING):  atorvastatin 80 milliGRAM(s) Oral at bedtime  azithromycin   Tablet 250 milliGRAM(s) Oral <User Schedule>  budesonide 160 MICROgram(s)/formoterol 4.5 MICROgram(s) Inhaler 2 Puff(s) Inhalation two times a day  cefepime   IVPB 2000 milliGRAM(s) IV Intermittent every 12 hours  clopidogrel Tablet 75 milliGRAM(s) Oral daily  dextrose 40% Gel 15 Gram(s) Oral once  dextrose 5%. 1000 milliLiter(s) (50 mL/Hr) IV Continuous <Continuous>  dextrose 5%. 1000 milliLiter(s) (100 mL/Hr) IV Continuous <Continuous>  dextrose 50% Injectable 25 Gram(s) IV Push once  dextrose 50% Injectable 12.5 Gram(s) IV Push once  dextrose 50% Injectable 25 Gram(s) IV Push once  glucagon  Injectable 1 milliGRAM(s) IntraMuscular once  insulin lispro (ADMELOG) corrective regimen sliding scale   SubCutaneous three times a day before meals  insulin lispro (ADMELOG) corrective regimen sliding scale   SubCutaneous at bedtime  metoprolol tartrate 50 milliGRAM(s) Oral two times a day  predniSONE   Tablet 4 milliGRAM(s) Oral daily  sodium chloride 0.9%. 1000 milliLiter(s) (60 mL/Hr) IV Continuous <Continuous>  tamsulosin 0.4 milliGRAM(s) Oral at bedtime  tiotropium 18 MICROgram(s) Capsule 1 Capsule(s) Inhalation daily  vancomycin  IVPB 1500 milliGRAM(s) IV Intermittent every 24 hours     REVIEW OF SYSTEMS:  CONSTITUTIONAL:  No Fever or chills  HEENT:  No diplopia or blurred vision.  No sore throat or runny nose.  CARDIOVASCULAR:  No chest pain or SOB.  RESPIRATORY:  No cough, shortness of breath, PND or orthopnea.  GASTROINTESTINAL:  No nausea, vomiting or diarrhea.  GENITOURINARY:  No dysuria, frequency or urgency. No Blood in urine  MUSCULOSKELETAL: left leg pain   SKIN:  No change in skin, hair or nails.  NEUROLOGIC:  No paresthesias, fasciculations, seizures or weakness.  PSYCHIATRIC:  No disorder of thought or mood.  ENDOCRINE:  No heat or cold intolerance, polyuria or polydipsia.  HEMATOLOGICAL:  No easy bruising or bleeding.     Physical Exam:  Vital Signs Last 24 Hrs  T(C): 37.1 (18 Dec 2021 08:34), Max: 37.1 (18 Dec 2021 08:34)  T(F): 98.8 (18 Dec 2021 08:34), Max: 98.8 (18 Dec 2021 08:34)  HR: 78 (18 Dec 2021 08:34) (78 - 104)  BP: 120/67 (18 Dec 2021 08:34) (116/67 - 120/80)  BP(mean): --  RR: 18 (18 Dec 2021 08:34) (18 - 18)  SpO2: 100% (18 Dec 2021 08:34) (96% - 100%)  Height (cm): 180.3 ( @ 15:01)  Weight (kg): 97.5 ( @ 15:01)  BMI (kg/m2): 30 ( @ 15:01)  BSA (m2): 2.17 ( @ 15:01)  GEN: NAD, obese   HEENT: normocephalic and atraumatic. EOMI. PERRL.    NECK: Supple.  No lymphadenopathy   LUNGS: Clear to auscultation.  HEART: Regular rate and rhythm   ABDOMEN: Soft, nontender, and nondistended.  Positive bowel sounds.    : No CVA tenderness  EXTREMITIES: left leg in thigh area has a scar in lateral side with no discharge or open wound.  Swelling in anterior part, no tenderness or fluctuation. No sign of cellulitis on soft tissue.   NEUROLOGIC: grossly intact.  PSYCHIATRIC: Appropriate affect .  SKIN: No rash, as above     Labs:      138  |  103  |  26<H>  ----------------------------<  162<H>  5.0   |  28  |  1.60<H>    Ca    10.2<H>      17 Dec 2021 17:43    TPro  7.7  /  Alb  2.9<L>  /  TBili  0.2  /  DBili  x   /  AST  15  /  ALT  13  /  AlkPhos  102                          11.1   7.49  )-----------( 334      ( 18 Dec 2021 04:53 )             35.1     PT/INR - ( 18 Dec 2021 04:53 )   PT: 13.4 sec;   INR: 1.15 ratio    PTT - ( 18 Dec 2021 04:53 )  PTT:36.9 sec    LIVER FUNCTIONS - ( 17 Dec 2021 17:43 )  Alb: 2.9 g/dL / Pro: 7.7 g/dL / ALK PHOS: 102 U/L / ALT: 13 U/L / AST: 15 U/L / GGT: x           COVID-19 PCR: NotDetec (21 @ 17:43)    All imaging and other data have been reviewed.  < from: CT Femur No Cont, Left (21 @ 22:24) >  IMPRESSION:  Old healed deformity of the mid left femur likely correlating with the   history of a prior osteomyelitis. Complex heterogeneous lobulated soft   tissue anterior and lateral to the middle two thirds of the left femur   concerning for an abscess. New lobulated intramedullary defect with tract   extending to the cortical surface in the mid left femur concerning for a   possible intramedullary abscess. A contrast-enhancedMRI of the left   femur is recommended for further evaluation.    Assessment and Plan:   82yo man with PMH of HTN, COPD on home O2, CAD s/p CABG, PVD s/p stents in b/l legs and left femoral fracture more than 50 years ago, was admitted with left leg pain on the site of old fracture after CT showed possible intramedullary abscess. He has had pain and infection in the old fracture area at least 3-4 times in the past, had multiple surgeries and drainage of area with a long term antibiotic treatment, last one years ago.   Most likely another infection and osteomyelitis with collection in area that needs intervention by ortho. Will cover empirically with 4th Gen cephalosporins and vancomycin until we have culture info.   Doppler neg for DVTs  Admission WBC normal, no fever    1- Left femoral OM and intramedullary abscess   - Blood cultures are testing will follow   - Ortho consult for possible intervention to drain the abscess, please send for cultures and pathology   - ESR and CRP for baseline  - Stop zosyn and start cefepime 2gm q12 adjusted for renal function   - Continue vancomycin 1500mg daily, will send trough before 4th dose and keep 15 to 20  - Follow creatinine to adjust ABx doses, mild MERRILL/CKD    2- COPD  - Continue azithromycin 250mg 3times weekly prophylactically   - Pulmonary consult noted  - On o2 and BiPAP at night time      Thank you for courtesy of this consult.     Will follow.  Discussed with the primary team.     Brandi العلي MD  Division of Infectious Diseases   Cell [phone] between 8am and 6pm   After 6pm and weekends please call ID service at [phone].     40 minutes spent on total encounter assessing patient, examination, chart reivew, counseling and coordinating care by the attending physician/nurse/care manager.

## 2021-12-18 NOTE — H&P ADULT - PROBLEM SELECTOR PLAN 7
- pt with remote hx of PAT vs PAF; diagnosed in [facility] within the last year  - c/w home metoprolol  - on home Eliquis 5mg BID; last dose AM of 12/17. Hold for possible OR intervention of intramedullary abscess   - EKG: SR with premature supraventricular complexes

## 2021-12-18 NOTE — DISCHARGE NOTE PROVIDER - [email or web address]_ALL_CORE
This document is complete and the patient is ready for discharge. lower back radiating to left lower extremity

## 2021-12-18 NOTE — DISCHARGE NOTE PROVIDER - HOSPITAL COURSE
ADMISSION H+P:    HPI:  The patient is an 82yo male with pmhx CAD s/p 3v CABG 2004, stents in 2019, HLD, HTN, PAT(on Eliquis), PVD(s/p stents in b/l legs -- right leg in 2020), osteomyelitis, COPD on prn home o2 and nocturnal bipap, type 2 Dm who presented to ED with increasing left leg pain x 1 week. States it had started a few weeks prior, but was intermittent and not as painful. Does admits to bumping his leg on a table a few times. Describes his pain as worse with movement and when pressure is applied. Denies rash, itching, fevers, chills, n/v/d, bleeding, cp, sob, sick contacts, numbness, tingling, new focal weakness, decreased ROM of LLE. Has been ambulating with a cane 2/2 pain. Of note has history of osteomyelitis of LLE and has has multiple operations on that leg.    In the ED cbc, cmp, coags, lacate,  CT femur , LLE duplex and covid pcr were obtained. significant for bun/cr 26/1.6, glucose 162, album 2.9, ca 10.2, lactate 1.9. covid pcr negative. US No evidence of left lower extremity deep venous thrombosis. CT revealed Old healed deformity of the mid left femur, Complex heterogeneous lobulated soft tissue anterior and lateral to the middle two thirds of the left femur   concerning for an abscess. New lobulated intramedullary defect with tract   extending to the cortical surface in the mid left femur concerning for a   possible intramedullary abscess.   Pt was given vanc & zosyn x 1, tylenol 650mg x 1.  (18 Dec 2021 00:18)      ---  HOSPITAL COURSE:     Patient was medically optimized and improved clinically throughout hospital course. Patient seen and examined on day of discharge.    Vital Signs      Physical Exam:      Patient is medically stable for discharge to ____ with outpatient follow up.  ---  CONSULTANTS:     ---  TIME SPENT:   I, the attending physician, was physically present for the key portions of the evaluation and management (E/M) service provided. The total amount of time spent reviewing the hospital course, laboratory values, imaging findings, assessing/counseling the patient, discussing with consultant physicians, social work, nursing staff was -- minutes.     ---  FINAL DISCHARGE DIAGNOSIS LIST:  Please see last daily progress note for final discharge diagnoses    ---  Primary care provider was made aware of plan for discharge:  [    ] NO     [     ] YES       ADMISSION H+P:    HPI:  The patient is an 80yo male with pmhx CAD s/p 3v CABG 2004, stents in 2019, HLD, HTN, PAT(on Eliquis), PVD(s/p stents in b/l legs -- right leg in 2020), osteomyelitis, COPD on prn home o2 and nocturnal bipap, type 2 Dm who presented to ED with increasing left leg pain x 1 week. States it had started a few weeks prior, but was intermittent and not as painful. Does admits to bumping his leg on a table a few times. Describes his pain as worse with movement and when pressure is applied. Denies rash, itching, fevers, chills, n/v/d, bleeding, cp, sob, sick contacts, numbness, tingling, new focal weakness, decreased ROM of LLE. Has been ambulating with a cane 2/2 pain. Of note has history of osteomyelitis of LLE and has has multiple operations on that leg.    In the ED cbc, cmp, coags, lacate,  CT femur , LLE duplex and covid pcr were obtained. significant for bun/cr 26/1.6, glucose 162, album 2.9, ca 10.2, lactate 1.9. covid pcr negative. US No evidence of left lower extremity deep venous thrombosis. CT revealed Old healed deformity of the mid left femur, Complex heterogeneous lobulated soft tissue anterior and lateral to the middle two thirds of the left femur   concerning for an abscess. New lobulated intramedullary defect with tract   extending to the cortical surface in the mid left femur concerning for a   possible intramedullary abscess.   Pt was given vanc & zosyn x 1, tylenol 650mg x 1.  (18 Dec 2021 00:18)      ---  HOSPITAL COURSE: Patient was admitted for management of left femoral OM and intramedullary abscess. Orthopedics was consulted; MR w and w/o of left femur _______. Home Eliquis was held in anticipation of possible IR drainage of abscess. Cardio ([facility]) was consulted for cardiac optimization. ID (Dr. العلي) was consulted; patient was treated with IV Cefepime and Vancomycin. Given history of COPD, patient was continued on azithromycin 250mg 3times weekly prophylactically. Nephro (Dr. English) was consulted for MERRILL; patient was treated with empiric hydration.  Azotemia, mostly CKD but cannot rule out mild, superimposed prerenal state.    Patient was medically optimized and improved clinically throughout hospital course. Patient seen and examined on day of discharge.    (Updated through 12/18)    Vital Signs      Physical Exam:      Patient is medically stable for discharge to ____ with outpatient follow up.  ---  CONSULTANTS:   Nephro: Dr. English  Cardio: [facility]  Orthopedics  ID: Dr. العلي    ---  TIME SPENT:   I, the attending physician, was physically present for the key portions of the evaluation and management (E/M) service provided. The total amount of time spent reviewing the hospital course, laboratory values, imaging findings, assessing/counseling the patient, discussing with consultant physicians, social work, nursing staff was -- minutes.     ---  FINAL DISCHARGE DIAGNOSIS LIST:  Please see last daily progress note for final discharge diagnoses       ADMISSION H+P:    HPI:  The patient is an 80yo male with pmhx CAD s/p 3v CABG 2004, stents in 2019, HLD, HTN, PAT(on Eliquis), PVD(s/p stents in b/l legs -- right leg in 2020), osteomyelitis, COPD on prn home o2 and nocturnal bipap, type 2 Dm who presented to ED with increasing left leg pain x 1 week. States it had started a few weeks prior, but was intermittent and not as painful. Does admits to bumping his leg on a table a few times. Describes his pain as worse with movement and when pressure is applied. Denies rash, itching, fevers, chills, n/v/d, bleeding, cp, sob, sick contacts, numbness, tingling, new focal weakness, decreased ROM of LLE. Has been ambulating with a cane 2/2 pain. Of note has history of osteomyelitis of LLE and has has multiple operations on that leg.    In the ED cbc, cmp, coags, lacate,  CT femur , LLE duplex and covid pcr were obtained. significant for bun/cr 26/1.6, glucose 162, album 2.9, ca 10.2, lactate 1.9. covid pcr negative. US No evidence of left lower extremity deep venous thrombosis. CT revealed Old healed deformity of the mid left femur, Complex heterogeneous lobulated soft tissue anterior and lateral to the middle two thirds of the left femur   concerning for an abscess. New lobulated intramedullary defect with tract   extending to the cortical surface in the mid left femur concerning for a   possible intramedullary abscess.   Pt was given vanc & zosyn x 1, tylenol 650mg x 1.  (18 Dec 2021 00:18)      ---  HOSPITAL COURSE: Patient was admitted for management of left femoral OM and intramedullary abscess. Orthopedics was consulted; MR w and w/o of left femur _______. Home Eliquis was held in anticipation of possible IR drainage of abscess. Cardio ([facility]) was consulted for cardiac optimization. ID (Dr. العلي) was consulted; patient was treated with IV Cefepime and Vancomycin. Given history of COPD, patient was continued on azithromycin 250mg 3times weekly prophylactically. Nephro (Dr. English) was consulted for MERRILL; patient was treated with empiric hydration. Azotemia was likely in the setting of mostly CKD but cannot rule out mild, superimposed prerenal state. Losartan was held 2/2 MERRILL. PT evaluated patient and recommended discharge to ______.     Patient was medically optimized and improved clinically throughout hospital course. Patient seen and examined on day of discharge.    (Updated through 12/19)    Vital Signs      Physical Exam:      Patient is medically stable for discharge to ____ with outpatient follow up.  ---  CONSULTANTS:   Nephro: Dr. English  Cardio: [facility]  Orthopedics  ID: Dr. العلي    ---  TIME SPENT:   I, the attending physician, was physically present for the key portions of the evaluation and management (E/M) service provided. The total amount of time spent reviewing the hospital course, laboratory values, imaging findings, assessing/counseling the patient, discussing with consultant physicians, social work, nursing staff was -- minutes.     ---  FINAL DISCHARGE DIAGNOSIS LIST:  Please see last daily progress note for final discharge diagnoses       ADMISSION H+P:    HPI:  The patient is an 82yo male with pmhx CAD s/p 3v CABG 2004, stents in 2019, HLD, HTN, PAT(on Eliquis), PVD(s/p stents in b/l legs -- right leg in 2020), osteomyelitis, COPD on prn home o2 and nocturnal bipap, type 2 Dm who presented to ED with increasing left leg pain x 1 week. States it had started a few weeks prior, but was intermittent and not as painful. Does admits to bumping his leg on a table a few times. Describes his pain as worse with movement and when pressure is applied. Denies rash, itching, fevers, chills, n/v/d, bleeding, cp, sob, sick contacts, numbness, tingling, new focal weakness, decreased ROM of LLE. Has been ambulating with a cane 2/2 pain. Of note has history of osteomyelitis of LLE and has has multiple operations on that leg.    In the ED cbc, cmp, coags, lacate,  CT femur , LLE duplex and covid pcr were obtained. significant for bun/cr 26/1.6, glucose 162, album 2.9, ca 10.2, lactate 1.9. covid pcr negative. US No evidence of left lower extremity deep venous thrombosis. CT revealed Old healed deformity of the mid left femur, Complex heterogeneous lobulated soft tissue anterior and lateral to the middle two thirds of the left femur   concerning for an abscess. New lobulated intramedullary defect with tract   extending to the cortical surface in the mid left femur concerning for a   possible intramedullary abscess.   Pt was given vanc & zosyn x 1, tylenol 650mg x 1.  (18 Dec 2021 00:18)    HOSPITAL COURSE: Patient was admitted for management of left femoral OM and intramedullary abscess. Orthopedics was consulted; MR w and w/o of left femur with multiloculated rim-enhancing abscess along the anterior + lateral aspect of the mid to distal femur. Rec. f/u MRI w/wo contrast after therapy to r/o underlying mass/neoplasm. Probable intraosseous abscess within mid to proximal femur diaphysis near the superior aspect of the abscess, probable areas of sequestrum, involucrum, and cloaca formation when correlated with CT. Findings also concerning for adjacent chronic osteo. Home Eliquis  and Plavix was held for IR drainage of abscess. Cardio ([facility]) was consulted for cardiac optimization. ID (Dr. العلي) was consulted; patient was treated with IV Cefepime and Vancomycin. Given history of COPD, patient was continued on azithromycin 250mg 3times weekly prophylactically. Nephro (Dr. English) was consulted for MERRILL; patient was treated with empiric hydration. Azotemia was likely in the setting of mostly CKD but cannot rule out mild, superimposed prerenal state. Losartan was held 2/2 MERRILL.  Kidney function improved and remains stable. Patient on BiPAP overnight and not requiring supplemental oxygen during the day. Blood cultures were negative. IR procedure performed and drained 80 cc collection from 2 pockets. Culture from drainage -----  PT evaluated patient and recommended discharge to home.  Patient was medically optimized and improved clinically throughout hospital course. Patient seen and examined on day of discharge.    (Updated through 12/19)    Vital Signs      Physical Exam:      Patient is medically stable for discharge to ____ with outpatient follow up.  ---  CONSULTANTS:   Nephro: Dr. English  Cardio: [facility]  Orthopedics  ID: Dr. العلي    ---  TIME SPENT:   I, the attending physician, was physically present for the key portions of the evaluation and management (E/M) service provided. The total amount of time spent reviewing the hospital course, laboratory values, imaging findings, assessing/counseling the patient, discussing with consultant physicians, social work, nursing staff was -- minutes.     ---  FINAL DISCHARGE DIAGNOSIS LIST:  Please see last daily progress note for final discharge diagnoses       ADMISSION H+P:    HPI:  The patient is an 82yo male with pmhx CAD s/p 3v CABG 2004, stents in 2019, HLD, HTN, PAT(on Eliquis), PVD(s/p stents in b/l legs -- right leg in 2020), osteomyelitis, COPD on prn home o2 and nocturnal bipap, type 2 Dm who presented to ED with increasing left leg pain x 1 week. States it had started a few weeks prior, but was intermittent and not as painful. Does admits to bumping his leg on a table a few times. Describes his pain as worse with movement and when pressure is applied. Denies rash, itching, fevers, chills, n/v/d, bleeding, cp, sob, sick contacts, numbness, tingling, new focal weakness, decreased ROM of LLE. Has been ambulating with a cane 2/2 pain. Of note has history of osteomyelitis of LLE and has has multiple operations on that leg.    In the ED cbc, cmp, coags, lacate,  CT femur , LLE duplex and covid pcr were obtained. significant for bun/cr 26/1.6, glucose 162, album 2.9, ca 10.2, lactate 1.9. covid pcr negative. US No evidence of left lower extremity deep venous thrombosis. CT revealed Old healed deformity of the mid left femur, Complex heterogeneous lobulated soft tissue anterior and lateral to the middle two thirds of the left femur   concerning for an abscess. New lobulated intramedullary defect with tract   extending to the cortical surface in the mid left femur concerning for a   possible intramedullary abscess.   Pt was given vanc & zosyn x 1, tylenol 650mg x 1.  (18 Dec 2021 00:18)    HOSPITAL COURSE: Patient was admitted for management of left femoral OM and intramedullary abscess. Orthopedics was consulted; MR w and w/o of left femur with multiloculated rim-enhancing abscess along the anterior + lateral aspect of the mid to distal femur. Rec. f/u MRI w/wo contrast after therapy to r/o underlying mass/neoplasm. Probable intraosseous abscess within mid to proximal femur diaphysis near the superior aspect of the abscess, probable areas of sequestrum, involucrum, and cloaca formation when correlated with CT. Findings also concerning for adjacent chronic osteo. Home Eliquis  and Plavix was held for IR drainage of abscess. Cardio ([facility]) was consulted for cardiac optimization. ID (Dr. العلي) was consulted; patient was treated with IV Cefepime and Vancomycin. Given history of COPD, patient was continued on azithromycin 250mg 3times weekly prophylactically. Nephro (Dr. English) was consulted for MERRILL; patient was treated with empiric hydration. Azotemia was likely in the setting of mostly CKD but cannot rule out mild, superimposed prerenal state. Losartan was held 2/2 MERRILL.  Kidney function improved and remains stable. Patient on BiPAP overnight and not requiring supplemental oxygen during the day. Blood cultures were negative. IR procedure performed and drain placement 12/27. Culture from drainage NGTD, Switched cefepime to Rocephin 2 gram daily, last dose -Jan 5. Stopped Vancomycin, since cultures negative for MRSA. Patient going home with antibiotics.   PT evaluated patient and recommended discharge to home.  Patient was medically optimized and improved clinically throughout hospital course. Patient seen and examined on day of discharge.    (Updated through 12/30)    Vital Signs  Vital Signs Last 24 Hrs  T(C): 36.7 (30 Dec 2021 05:15), Max: 37.1 (29 Dec 2021 20:19)  T(F): 98.1 (30 Dec 2021 05:15), Max: 98.8 (29 Dec 2021 20:19)  HR: 78 (30 Dec 2021 05:15) (56 - 91)  BP: 121/76 (30 Dec 2021 05:15) (101/61 - 121/76)  BP(mean): --  RR: 18 (30 Dec 2021 05:15) (17 - 18)  SpO2: 94% (30 Dec 2021 05:15) (94% - 98%)      Physical Exam:  GENERAL:  no acute distress  EYES: sclera clear, EOM intact, PERRLA, no exudates  ENMT: normocephalic, atraumatic, moist mucous membranes.   NECK: supple, soft  LUNGS: good air entry bilaterally, clear to auscultation, symmetric breath sounds, no wheezing or rhonchi appreciated  HEART: soft S1/S2, regular rate and rhythm, no murmurs noted, no lower extremity edema  GASTROINTESTINAL: abdomen is soft, nontender, nondistended, normoactive bowel sounds, no palpable masses  INTEGUMENT: good skin turgor, no lesions noted  MUSCULOSKELETAL: no cyanosis, clubbing, edema, calves nontender to palpation b/l  NEUROLOGIC: awake, alert, oriented x3, good muscle tone in 4 extremities, no obvious sensory deficits  PSYCHIATRIC: mood is good, affect is congruent, linear and logical thought process  HEME/LYMPH: no palpable supraclavicular nodules, no obvious ecchymosis or petechiae      Patient is medically stable for discharge to home and  with outpatient follow up with ortho and ID after discharge.  ---  CONSULTANTS:   Nephro: Dr. English  Cardio: [facility]  Orthopedics  ID: Dr. العلي    ---  TIME SPENT:   I, the attending physician, was physically present for the key portions of the evaluation and management (E/M) service provided. The total amount of time spent reviewing the hospital course, laboratory values, imaging findings, assessing/counseling the patient, discussing with consultant physicians, social work, nursing staff was -- minutes.     ---  FINAL DISCHARGE DIAGNOSIS LIST:  Please see last daily progress note for final discharge diagnoses       ADMISSION H+P:    HPI:  The patient is an 80yo male with pmhx CAD s/p 3v CABG 2004, stents in 2019, HLD, HTN, PAT(on Eliquis), PVD(s/p stents in b/l legs -- right leg in 2020), osteomyelitis, COPD on prn home o2 and nocturnal bipap, type 2 Dm who presented to ED with increasing left leg pain x 1 week. States it had started a few weeks prior, but was intermittent and not as painful. Does admits to bumping his leg on a table a few times. Describes his pain as worse with movement and when pressure is applied. Denies rash, itching, fevers, chills, n/v/d, bleeding, cp, sob, sick contacts, numbness, tingling, new focal weakness, decreased ROM of LLE. Has been ambulating with a cane 2/2 pain. Of note has history of osteomyelitis of LLE and has has multiple operations on that leg.    In the ED cbc, cmp, coags, lacate,  CT femur , LLE duplex and covid pcr were obtained. significant for bun/cr 26/1.6, glucose 162, album 2.9, ca 10.2, lactate 1.9. covid pcr negative. US No evidence of left lower extremity deep venous thrombosis. CT revealed Old healed deformity of the mid left femur, Complex heterogeneous lobulated soft tissue anterior and lateral to the middle two thirds of the left femur   concerning for an abscess. New lobulated intramedullary defect with tract   extending to the cortical surface in the mid left femur concerning for a   possible intramedullary abscess.   Pt was given vanc & zosyn x 1, tylenol 650mg x 1.  (18 Dec 2021 00:18)    HOSPITAL COURSE: Patient was admitted for management of left femoral OM and intramedullary abscess. Orthopedics was consulted; MR w and w/o of left femur with multiloculated rim-enhancing abscess along the anterior + lateral aspect of the mid to distal femur. Rec. f/u MRI w/wo contrast after therapy to r/o underlying mass/neoplasm. Probable intraosseous abscess within mid to proximal femur diaphysis near the superior aspect of the abscess, probable areas of sequestrum, involucrum, and cloaca formation when correlated with CT. Findings also concerning for adjacent chronic osteo. Home Eliquis  and Plavix was held for IR drainage of abscess. [facility]io ([facility]) was consulted for cardiac optimization. ID (Dr. العلي) was consulted; patient was treated with IV Cefepime and Vancomycin. Given history of COPD, patient was continued on azithromycin 250mg 3times weekly prophylactically. Nephro (Dr. English) was consulted for MERRILL; patient was treated with empiric hydration. Azotemia was likely in the setting of mostly CKD but cannot rule out mild, superimposed prerenal state. Losartan was held 2/2 MERRILL.  Kidney function improved and remains stable. Patient on BiPAP overnight and not requiring supplemental oxygen during the day. Blood cultures were negative. IR procedure performed and drain placement 12/27. Culture from drainage NGTD, Switched cefepime to Rocephin 2 gram daily, last dose -Jan 28. Stopped Vancomycin, since cultures negative for MRSA. Patient going home with home infusion of IV antibiotics.   PT evaluated patient and recommended discharge to home.  Patient was medically optimized and improved clinically throughout hospital course. Patient seen and examined on day of discharge.      Vital Signs  Vital Signs Last 24 Hrs  T(C): 36.7 (30 Dec 2021 05:15), Max: 37.1 (29 Dec 2021 20:19)  T(F): 98.1 (30 Dec 2021 05:15), Max: 98.8 (29 Dec 2021 20:19)  HR: 78 (30 Dec 2021 05:15) (56 - 91)  BP: 121/76 (30 Dec 2021 05:15) (101/61 - 121/76)  BP(mean): --  RR: 18 (30 Dec 2021 05:15) (17 - 18)  SpO2: 94% (30 Dec 2021 05:15) (94% - 98%)      Physical Exam:  GENERAL:  no acute distress  EYES: sclera clear, EOM intact, PERRLA, no exudates  ENMT: normocephalic, atraumatic, moist mucous membranes.   NECK: supple, soft  LUNGS: good air entry bilaterally, clear to auscultation, symmetric breath sounds, no wheezing or rhonchi appreciated  HEART: soft S1/S2, regular rate and rhythm, no murmurs noted, no lower extremity edema  GASTROINTESTINAL: abdomen is soft, nontender, nondistended, normoactive bowel sounds, no palpable masses  INTEGUMENT: good skin turgor, no lesions noted  MUSCULOSKELETAL: no cyanosis, clubbing, edema, calves nontender to palpation b/l  NEUROLOGIC: awake, alert, oriented x3, good muscle tone in 4 extremities, no obvious sensory deficits  PSYCHIATRIC: mood is good, affect is congruent, linear and logical thought process  HEME/LYMPH: no palpable supraclavicular nodules, no obvious ecchymosis or petechiae      Patient is medically stable for discharge to home and  with outpatient follow up with ortho and ID after discharge.  ---  CONSULTANTS:   Nephro: Dr. English  Cardio: [facility]  Orthopedics  ID: Dr. العلي    ---  TIME SPENT:   I, the attending physician, was physically present for the key portions of the evaluation and management (E/M) service provided. The total amount of time spent reviewing the hospital course, laboratory values, imaging findings, assessing/counseling the patient, discussing with consultant physicians, social work, nursing staff was 38 minutes.     ---  FINAL DISCHARGE DIAGNOSIS LIST:  Please see last daily progress note for final discharge diagnoses

## 2021-12-18 NOTE — DISCHARGE NOTE PROVIDER - NSDCFUSCHEDAPPT_GEN_ALL_CORE_FT
YUE COTTO ; 12/21/2021 ; NPP Med Pulm [address] [facility]  YUE COTTO ; 01/31/2022 ; NPP Surg Vasc [address] [facility] ; 01/31/2022 ; NPP Surg Vasc [address] YUE COTTO ; 01/31/2022 ; NPP Surg Vasc [address] ; 01/31/2022 ; NPP Surg Vasc [address]

## 2021-12-18 NOTE — H&P ADULT - NSHPSOCIALHISTORY_GEN_ALL_CORE
Tobacco: Denies, former smoker-quit 30 years ago, 1ppd x 20 years  EtOH: Denies  Recreational drug use: former frequent marijuana - last use 2-3 yrs ago  Lives with: Wife  Ambulates: Without assistance  COVID vaccinated moderna 2/2 march 2021 Tobacco: Denies, former smoker-quit 30 years ago, 1ppd x 20 years  EtOH: Denies  Recreational drug use: former frequent marijuana - last use 2-3 yrs ago  Lives with: Wife  Ambulates: Without assistance; with cane when in pain  COVID vaccinated moderna 2/2 march 2021; received booster

## 2021-12-18 NOTE — PROGRESS NOTE ADULT - PROBLEM SELECTOR PLAN 1
Intramedullary Abscess   - pt with h/o compound fracture/osteomyelitis of left femur. now p/w left leg pain.   - CT revealed Old healed deformity of the mid left femur , Complex heterogeneous lobulated soft  tissue anterior and lateral to the middle two thirds of the left femur   concerning for an abscess. New lobulated intramedullary defect with tract   extending to the cortical surface in the mid left femur concerning for a   possible intramedullary abscess.   - tylenol for pain  - f/u BCx x2  - Doppler negative for DVT  - Afebrile, no leukocytosis. monitor for fever. trend daily cbc with diff   - s/p vancomycin & Zosyn x 1 continue   - imaging studies performed non contrast due to pt renal function.   - Ortho consulted, recommening MR w and wo to further assess the abscess. Possibly could be drained by IR, but may require surgical I&D  - ID, Dr. العلي, consulted  -Nephro consulted for recommendations on using contrast with patient in [facility] Intramedullary Abscess   - pt with h/o compound fracture/osteomyelitis of left femur. now p/w left leg pain.   - CT revealed Old healed deformity of the mid left femur , Complex heterogeneous lobulated soft  tissue anterior and lateral to the middle two thirds of the left femur   concerning for an abscess. New lobulated intramedullary defect with tract   extending to the cortical surface in the mid left femur concerning for a   possible intramedullary abscess.   - tylenol for pain  - f/u BCx x2  - Doppler negative for DVT  - Afebrile, no leukocytosis. monitor for fever. trend daily cbc with diff   - s/p vancomycin & Zosyn x 1 continue   - imaging studies performed non contrast due to pt renal function.   - Ortho consulted, recommening MR w and wo to further assess the abscess.   - ID, Dr. العلي, consulted  -Nephro consulted for recommendations on using contrast with patient in [facility]  - FU w/ IR, on Monday may take patient for drain, then can be placed on chronic suppressive therapy with IV Abx

## 2021-12-18 NOTE — H&P ADULT - NSHPPHYSICALEXAM_GEN_ALL_CORE
Vital Signs Last 24 Hrs  T(C): 36.7 (17 Dec 2021 15:01), Max: 36.7 (17 Dec 2021 15:01)  T(F): 98 (17 Dec 2021 15:01), Max: 98 (17 Dec 2021 15:01)  HR: 78 (17 Dec 2021 21:09) (78 - 100)  BP: 116/67 (17 Dec 2021 21:09) (116/67 - 120/80)  BP(mean): --  RR: 18 (17 Dec 2021 21:09) (18 - 18)  SpO2: 96% (17 Dec 2021 21:09) (96% - 97%) T(C): 36.7 (12-17-21 @ 15:01), Max: 36.7 (12-17-21 @ 15:01)  HR: 78 (12-17-21 @ 21:09) (78 - 100)  BP: 116/67 (12-17-21 @ 21:09) (116/67 - 120/80)  RR: 18 (12-17-21 @ 21:09) (18 - 18)  SpO2: 96% (12-17-21 @ 21:09) (96% - 97%)    GENERAL: patient appears well, no acute distress, appropriate, pleasant  EYES: sclera clear, no exudates  ENMT: oropharynx clear without erythema, no exudates, moist mucous membranes  LUNGS: decreased BS b/l, clear to auscultation, symmetric breath sounds, no wheezing or rhonchi appreciated  HEART: soft S1/S2, regular rate and rhythm, no lower extremity edema  GASTROINTESTINAL: abdomen is soft, nontender, mildly distended(pt states it his chronic), normoactive bowel sounds  INTEGUMENT: good skin turgor, warm skin, appears well perfused; scar on later left thigh  MUSCULOSKELETAL: no clubbing or cyanosis, no obvious deformity; normal ROM of left LLE; no ttp of LLE  NEUROLOGIC: awake, alert, oriented x3, good muscle tone in 4 extremities, no obvious sensory deficits  PSYCHIATRIC: mood is good, affect is congruent, linear and logical thought process  HEME/LYMPH: no palpable supraclavicular nodules, no obvious ecchymosis or petechiae T(C): 36.7 (12-17-21 @ 15:01), Max: 36.7 (12-17-21 @ 15:01)  HR: 78 (12-17-21 @ 21:09) (78 - 100)  BP: 116/67 (12-17-21 @ 21:09) (116/67 - 120/80)  RR: 18 (12-17-21 @ 21:09) (18 - 18)  SpO2: 96% (12-17-21 @ 21:09) (96% - 97%)    GENERAL: patient appears well, no acute distress, appropriate, pleasant  EYES: sclera clear, no exudates  ENMT: oropharynx clear without erythema, no exudates, moist mucous membranes  LUNGS: decreased BS b/l, clear to auscultation, symmetric breath sounds, no wheezing or rhonchi appreciated  HEART: soft S1/S2, regular rate and rhythm, no lower extremity edema  GASTROINTESTINAL: abdomen is soft, nontender, mildly distended(pt states it his chronic), normoactive bowel sounds  INTEGUMENT: good skin turgor, warm skin, appears well perfused; scar on later left thigh  MUSCULOSKELETAL: no clubbing or cyanosis, normal ROM of left LLE; no ttp of LLE  NEUROLOGIC: awake, alert, oriented x3, good muscle tone in 4 extremities, no obvious sensory deficits  PSYCHIATRIC: mood is good, affect is congruent, linear and logical thought process  HEME/LYMPH: no palpable supraclavicular nodules, no obvious ecchymosis or petechiae

## 2021-12-18 NOTE — H&P ADULT - PROBLEM SELECTOR PLAN 4
- chronic  - hold home metformin and Jardiance  - Started on low dose IIS  - hypoglycemia protocol   - f/u A1c

## 2021-12-18 NOTE — PROGRESS NOTE ADULT - ASSESSMENT
The patient is an 82yo male with pmhx CAD s/p 3v CABG 2004, stents in 2019, HLD, HTN, PAT(on Eliquis), PVD(s/p stents in b/l legs -- right leg in 2020), osteomyelitis, COPD on prn home o2 and nocturnal bipap, type 2 Dm  presents to ED with leg pain x 1 week , found to have CT findings c/w abscess in left femur

## 2021-12-18 NOTE — CONSULT NOTE ADULT - SUBJECTIVE AND OBJECTIVE BOX
CHIEF COMPLAINT: Patient is a 81y old  Male who presents with a chief complaint of L femur r/o abscess       HPI:  The patient is an 80yo male with pmhx CAD s/p 3v CABG 2004, stents in , HLD, HTN, PAT(on Eliquis), PVD(s/p stents in b/l legs -- right leg in ), osteomyelitis, COPD on prn home o2 and nocturnal bipap, type 2 Dm who presented to ED with increasing left leg pain x 1 week. States it had started a few weeks prior, but was intermittent and not as painful. Does admits to bumping his leg on a table a few times. Describes his pain as worse with movement and when pressure is applied. Denies rash, itching, fevers, chills, n/v/d, bleeding, cp, sob, sick contacts, numbness, tingling, new focal weakness, decreased ROM of LLE. Has been ambulating with a cane 2/2 pain. Of note has history of osteomyelitis of LLE and has has multiple operations on that leg.    In the ED cbc, cmp, coags, lacate,  CT femur , LLE duplex and covid pcr were obtained. significant for bun/cr 26/1.6, glucose 162, album 2.9, ca 10.2, lactate 1.9. covid pcr negative. US No evidence of left lower extremity deep venous thrombosis. CT revealed Old healed deformity of the mid left femur, Complex heterogeneous lobulated soft tissue anterior and lateral to the middle two thirds of the left femur   concerning for an abscess. New lobulated intramedullary defect with tract   extending to the cortical surface in the mid left femur concerning for a   possible intramedullary abscess.   Pt was given vanc & zosyn x 1, tylenol 650mg x 1.  (18 Dec 2021 00:18)      PAST MEDICAL & SURGICAL HISTORY:  Diabetes Mellitus, Type II    CAD (Coronary Artery Disease)  s/p 3v CABG ; stents placed in [place] in     Dyslipidemia    Osteomyelitis    COPD (chronic obstructive pulmonary disease)  on 2L at home and BiPAP at night; intubated     Hypertension    PVD (peripheral vascular disease)    History of PAT (paroxysmal atrial tachycardia)    CABG (Coronary Artery Bypass Graft)      Compound fracture  left leg    S/P primary angioplasty with coronary stent        SOCIAL HISTORY:  No tobacco, ethanol, or drug abuse.    FAMILY HISTORY:  Family history of diabetes mellitus (Sibling)    Family hx of lung cancer  brother,  age 82, used to smoke with pt      No family history of acute MI or sudden cardiac death.    MEDICATIONS  (STANDING):  atorvastatin 80 milliGRAM(s) Oral at bedtime  azithromycin   Tablet 250 milliGRAM(s) Oral <User Schedule>  budesonide 160 MICROgram(s)/formoterol 4.5 MICROgram(s) Inhaler 2 Puff(s) Inhalation two times a day  cefepime   IVPB 2000 milliGRAM(s) IV Intermittent every 12 hours  clopidogrel Tablet 75 milliGRAM(s) Oral daily  dextrose 40% Gel 15 Gram(s) Oral once  dextrose 5%. 1000 milliLiter(s) (50 mL/Hr) IV Continuous <Continuous>  dextrose 5%. 1000 milliLiter(s) (100 mL/Hr) IV Continuous <Continuous>  dextrose 50% Injectable 25 Gram(s) IV Push once  dextrose 50% Injectable 12.5 Gram(s) IV Push once  dextrose 50% Injectable 25 Gram(s) IV Push once  glucagon  Injectable 1 milliGRAM(s) IntraMuscular once  insulin lispro (ADMELOG) corrective regimen sliding scale   SubCutaneous three times a day before meals  insulin lispro (ADMELOG) corrective regimen sliding scale   SubCutaneous at bedtime  metoprolol tartrate 50 milliGRAM(s) Oral two times a day  predniSONE   Tablet 4 milliGRAM(s) Oral daily  sodium chloride 0.9%. 1000 milliLiter(s) (60 mL/Hr) IV Continuous <Continuous>  tamsulosin 0.4 milliGRAM(s) Oral at bedtime  tiotropium 18 MICROgram(s) Capsule 1 Capsule(s) Inhalation daily  vancomycin  IVPB 1500 milliGRAM(s) IV Intermittent every 24 hours    MEDICATIONS  (PRN):  acetaminophen     Tablet .. 650 milliGRAM(s) Oral every 6 hours PRN Mild Pain (1 - 3), Moderate Pain (4 - 6)      Allergies    No Known Allergies    Intolerances    shellfish (Nausea)      REVIEW OF SYSTEMS:    CONSTITUTIONAL: No weakness, fevers or chills  EYES/ENT: No visual changes;  No vertigo or throat pain   NECK: No pain or stiffness  RESPIRATORY: No cough, wheezing, hemoptysis; No shortness of breath  CARDIOVASCULAR: No chest pain or palpitations  GASTROINTESTINAL: No abdominal pain. No nausea, vomiting, or hematemesis; No diarrhea or constipation. No melena or hematochezia.  GENITOURINARY: No dysuria, frequency or hematuria  NEUROLOGICAL: No numbness or weakness  SKIN: No itching or rash  All other review of systems is negative unless indicated above    VITAL SIGNS:   Vital Signs Last 24 Hrs  T(C): 36.9 (18 Dec 2021 12:13), Max: 37.1 (18 Dec 2021 08:34)  T(F): 98.5 (18 Dec 2021 12:13), Max: 98.8 (18 Dec 2021 08:34)  HR: 85 (18 Dec 2021 12:13) (78 - 104)  BP: 135/65 (18 Dec 2021 12:13) (116/67 - 135/65)  BP(mean): --  RR: 18 (18 Dec 2021 12:13) (18 - 18)  SpO2: 100% (18 Dec 2021 12:13) (96% - 100%)    I&O's Summary      On Exam:  Tele: ST  Constitutional: NAD, alert and oriented x 3  Lungs:  Non-labored, breath sounds are clear bilaterally, No wheezing, rales or rhonchi  Cardiovascular: RRR.  S1 and S2 positive.  No murmurs, rubs, gallops or clicks  Gastrointestinal: Bowel Sounds present, soft, nontender.   Lymph: No peripheral edema. No cervical lymphadenopathy.  Neurological: Alert, no focal deficits  Skin: No rashes or ulcers   Psych:  Mood & affect appropriate.    LABS: All Labs Reviewed:                        11.1   7.49  )-----------( 334      ( 18 Dec 2021 04:53 )             35.1                         12.8   8.95  )-----------( 352      ( 17 Dec 2021 17:43 )             41.1     18 Dec 2021 13:41    139    |  108    |  25     ----------------------------<  153    4.6     |  21     |  1.50   17 Dec 2021 17:43    138    |  103    |  26     ----------------------------<  162    5.0     |  28     |  1.60     Ca    9.2        18 Dec 2021 13:41  Ca    10.2       17 Dec 2021 17:43    TPro  7.1    /  Alb  2.5    /  TBili  0.5    /  DBili  x      /  AST  16     /  ALT  11     /  AlkPhos  90     18 Dec 2021 13:41  TPro  7.7    /  Alb  2.9    /  TBili  0.2    /  DBili  x      /  AST  15     /  ALT  13     /  AlkPhos  102    17 Dec 2021 17:43    PT/INR - ( 18 Dec 2021 04:53 )   PT: 13.4 sec;   INR: 1.15 ratio         PTT - ( 18 Dec 2021 04:53 )  PTT:36.9 sec      Blood Culture:         RADIOLOGY:  < from: Xray Chest 1 View AP/PA (21 @ 17:09) >  ACC: [id number] EXAM:  XR CHEST AP OR PA 1V                          PROCEDURE DATE:  2021          INTERPRETATION:  Evaluate for pneumonia    AP chest. Prior 9/3/2021.    Median sternotomy. Normal heart mediastinum. Hyperinflated lungs. No   consolidation or effusion.    IMPRESSION: Hyperinflated lungs. No active infiltrates    --- End of Report ---            RODRIGO FAITH MD; Attending Radiologist  This document has been electronically signed. Dec 18 2021  9:39AM    < end of copied text >  < from: US Duplex Venous Lower Ext Ltd, Left (21 @ 19:56) >  ACC: [id number] EXAM:  US DPLX LWR EXT VEINS LTD LT                          PROCEDURE DATE:  2021          INTERPRETATION:  CLINICAL INFORMATION: Left leg pain. Evaluate for DVT.    COMPARISON: 2020.    TECHNIQUE: Duplex sonography of theLEFT LOWER extremity veins with color   and spectral Doppler, with and without compression.    FINDINGS:    There is normal compressibility of the left common femoral, femoral and   popliteal veins.  The contralateral common femoral vein is patent.  Doppler examination shows normal spontaneous and phasic flow.    No calf vein thrombosis is detected.    IMPRESSION:  No evidence of left lower extremity deep venous thrombosis.          --- End of Report ---            ELEAZAR CASTAÑEDA MD; Attending Radiologist  This document has been electronically signed. Dec 17 2021  8:17PM    < end of copied text >  < from: CT Femur No Cont, Left (21 @ 22:24) >  ACC: [id number] EXAM:  CT FEMUR ONLY LT                          PROCEDURE DATE:  2021          INTERPRETATION:  CLINICAL INFORMATION: Left leg pain. History of fracture   and osteomyelitis.    TECHNIQUE: CT of the left femur was performed in bone and soft tissue   windows with coronal and sagittal reformats. No intravenous contrast was   administered.    COMPARISON: CT of bilateral femurs from 2010.    FINDINGS:    Bone: An old healed deformity of the left mid femur is not significantly   changed. An old left femoral intramedullary dominick tract is noted. No acute   fracture or dislocation is demonstrated. Heterotopic ossification   superior to the left greater trochanter is not significantly changed. A   new lobulated 2.2 x 1.8 cm intramedullary defect with tract to the   lateral cortical surface is seen in the mid femur. Degenerative changes   of the left knee is noted.    Soft tissues: Complex heterogeneous lobulated soft tissue measuring 6.9 x   6.9 x 18.3 cm is seen anterior and lateral to the femur involving the   middle two thirds of the femur with mild adjacent inflammatory change.   The vastus intermedialis and lateralis musculature is markedly atrophic.   Vascular calcifications are noted. Bilateral external iliac artery and   left mid to distal femoral artery stents are seen.    IMPRESSION:    Old healed deformity of the mid left femur likely correlating with the   history of a prior osteomyelitis. Complex heterogeneous lobulated soft   tissue anterior and lateral to the middle two thirds of the left femur   concerning for an abscess. New lobulated intramedullary defect with tract   extending to the cortical surface in the mid left femur concerning for a   possible intramedullary abscess. A contrast-enhancedMRI of the left   femur is recommended for further evaluation.    --- End of Report ---            PAZ CHUA MD; Attending Radiologist  This document has been electronically signed. Dec 17 2021 11:20PM    < end of copied text >    EKG:  < from: 12 Lead ECG (21 @ 16:27) >  Ventricular Rate 97 BPM    Atrial Rate 97 BPM    P-R Interval 114 ms    QRS Duration 80 ms    Q-T Interval 344 ms    QTC Calculation(Bazett) 436 ms    P Axis 59 degrees    R Axis 79 degrees    T Axis 57 degrees    Diagnosis Line Sinus rhythm with premature supraventricular complexes  Otherwise normal ECG  When compared with ECG of 03-SEP-2021 20:01,  premature supraventricular complexes are now present  Confirmed by Regla Figueroa ([id number]) on 2021 2:58:38 PM    < end of copied text >  < from: TTE with Doppler (w/Cont) (21 @ 06:53) >    Patient name: YUE COTTO  YOB: 1940   Age: 81 (M)   MR#: [id number]  Study Date: 8/3/2021  [facility]  Study quality: Technically difficult  Referring Physician: Cecelia Mendenhall MD  BloodPressure: 156/93 mmHg  Height: 180 cm  Weight: 113 kg  BSA: 2.3 m2  Heart Rate: 99 mmHg  ------------------------------------------------------------------------  PROCEDURE: Transthoracic echocardiogram with 2-D, M-Mode  and complete spectral and color flow Doppler. Verbal  consent was obtained for injection of  Ultrasonic Enhancing  Agent following a discussion of risks and benefits.  Following intravenous injection of Ultrasonic Enhancing  Agent , harmonic imaging was performed.  INDICATION: Abnormal electrocardiogram (ECG) (EKG) (R94.31)  ------------------------------------------------------------------------  Dimensions:    Normal Values:  LA:     3.4    2.0 - 4.0 cm  Ao:     3.6    2.0 - 3.8 cm  SEPTUM: 0.7    0.6 - 1.2 cm  PWT:    0.6  0.6 - 1.1 cm  LVIDd:  3.9    3.0 - 5.6 cm  LVIDs:  3.3    1.8 - 4.0 cm  Derived variables:  LVMI: 34 g/m2  RWT: 0.35  Fractional short: 14 %  EF (Visual Estimate): 45-50 %  Doppler Peak Velocity (m/sec): AoV=0.9  ------------------------------------------------------------------------  Observations:  Mitral Valve: Mitral valve not well visualized, probably  normal. Minimal mitral regurgitation.  Aortic Valve/Aorta: Calcified trileaflet aortic valve with  normal opening. Peak transaortic valve gradient equals 4 mm  Hg, estimated aortic valve area equals 3.1 sqcm. No aortic  valve regurgitation seen. Peak left ventricular outflow  tract gradient equals 3 mm Hg.  Aortic Root: 3.6 cm.  Left Atrium: Normal left atrium.  Left Ventricle: Mild segmental left ventricular systolic  dysfunction. The ejection fraction varies with the R-R  interval.  The apical inferior wall, the apical septum, the  apical lateral wall, the basal inferior wall, the mid  anterior wall, and the mid inferior wall are hypokinetic.  Septal motion consistent with cardiac surgery. Normal left  ventricular internal dimensions and wall thicknesses. Mild  diastolic dysfunction (Stage I).  Right Heart: Normal right atrium. Normal right ventricular  size and function. Normal tricuspid valve. Minimal  tricuspid regurgitation. Normal pulmonic valve. No pulmonic  regurgitation.  Pericardium/Pleura: Normal pericardium with no pericardial  effusion.  Hemodynamic: Color Doppler demonstrates no evidence of a  patent foramen ovale.  ------------------------------------------------------------------------  Conclusions:  1. Mitral valve not well visualized, probably normal.  Minimal mitral regurgitation.  2. Calcified trileaflet aortic valve with normal opening.  No aortic valve regurgitation seen.  3. Mild segmental left ventricular systolic dysfunction.  The ejection fraction varies with the R-R interval.  The  apical inferior wall, the apical septum, the apical lateral  wall, the basal inferior wall, the mid anterior wall, and  the mid inferior wall are hypokinetic.  Septal motion  consistent with cardiac surgery.  4. Mild diastolic dysfunction (Stage I).  5. Normal right ventricular size and function.  *** Compared with echocardiogram of 2018, there has  beenan interval decline in left ventricular systolic  function.  ------------------------------------------------------------------------  Confirmed on  8/3/2021 - 15:15:53 by Angel Luis Kemp M.D.  ------------------------------------------------------------------------    < end of copied text >

## 2021-12-18 NOTE — PROGRESS NOTE ADULT - ATTENDING COMMENTS
80yo male with pmhx CAD s/p 3v CABG 2004, stents in 2019, HLD, HTN, PAT(on Eliquis), PVD(s/p stents in b/l legs -- right leg in 2020), osteomyelitis, COPD on prn home o2 and nocturnal bipap, type 2 Dm  presents to ED with leg pain x 1 week , found to have CT findings c/w abscess in left femur Plan: cont iv antibx, apprec ID and ortho collaboration in care, MR with contrast to follow and poss IR procedure vs surgery, monitor clinical course, apprec cardio recs and optimization is surgery needed

## 2021-12-18 NOTE — PROGRESS NOTE ADULT - SUBJECTIVE AND OBJECTIVE BOX
Patient is a 81y old  Male who presents with a chief complaint of     INTERVAL HPI/OVERNIGHT EVENTS: Patient has resting comfortably in bed, feeling well. Denies fevers, chills, headache, lightheadedness, chest pain, dyspnea, abdominal pain, nausea, vomiting, diarrhea, constipation.  Patient states that he is having mild pain in right femur, mainly with ambulation not at rest.  No overnight events occurred.    MEDICATIONS  (STANDING):  atorvastatin 80 milliGRAM(s) Oral at bedtime  azithromycin   Tablet 250 milliGRAM(s) Oral <User Schedule>  budesonide 160 MICROgram(s)/formoterol 4.5 MICROgram(s) Inhaler 2 Puff(s) Inhalation two times a day  cefepime   IVPB 2000 milliGRAM(s) IV Intermittent every 12 hours  clopidogrel Tablet 75 milliGRAM(s) Oral daily  dextrose 40% Gel 15 Gram(s) Oral once  dextrose 5%. 1000 milliLiter(s) (50 mL/Hr) IV Continuous <Continuous>  dextrose 5%. 1000 milliLiter(s) (100 mL/Hr) IV Continuous <Continuous>  dextrose 50% Injectable 25 Gram(s) IV Push once  dextrose 50% Injectable 12.5 Gram(s) IV Push once  dextrose 50% Injectable 25 Gram(s) IV Push once  glucagon  Injectable 1 milliGRAM(s) IntraMuscular once  insulin lispro (ADMELOG) corrective regimen sliding scale   SubCutaneous three times a day before meals  insulin lispro (ADMELOG) corrective regimen sliding scale   SubCutaneous at bedtime  metoprolol tartrate 50 milliGRAM(s) Oral two times a day  predniSONE   Tablet 4 milliGRAM(s) Oral daily  sodium chloride 0.9%. 1000 milliLiter(s) (60 mL/Hr) IV Continuous <Continuous>  tamsulosin 0.4 milliGRAM(s) Oral at bedtime  tiotropium 18 MICROgram(s) Capsule 1 Capsule(s) Inhalation daily  vancomycin  IVPB 1500 milliGRAM(s) IV Intermittent every 24 hours    MEDICATIONS  (PRN):  acetaminophen     Tablet .. 650 milliGRAM(s) Oral every 6 hours PRN Mild Pain (1 - 3), Moderate Pain (4 - 6)      Allergies    No Known Allergies    Intolerances    shellfish (Nausea)      Review of Systems: CONSTITUTIONAL: denies fever, chills, fatigue, weakness  HEENT: denies blurred vision, sore throat  SKIN: denies new lesions, rash  CARDIOVASCULAR: denies chest pain, chest pressure, palpitations  RESPIRATORY: denies shortness of breath, sputum production  GASTROINTESTINAL: denies nausea, vomiting, diarrhea, abdominal pain  GENITOURINARY: denies dysuria, discharge  NEUROLOGICAL: denies numbness, headache, focal weakness  MUSCULOSKELETAL: Admits left outer thigh pain, denies decrease ROM  HEMATOLOGIC: denies gross bleeding, bruising  LYMPHATICS: denies enlarged lymph nodes, extremity swelling    Vital Signs Last 24 Hrs  T(C): 36.9 (18 Dec 2021 12:13), Max: 37.1 (18 Dec 2021 08:34)  T(F): 98.5 (18 Dec 2021 12:13), Max: 98.8 (18 Dec 2021 08:34)  HR: 85 (18 Dec 2021 12:13) (78 - 104)  BP: 135/65 (18 Dec 2021 12:13) (116/67 - 135/65)  BP(mean): --  RR: 18 (18 Dec 2021 12:13) (18 - 18)  SpO2: 100% (18 Dec 2021 12:13) (96% - 100%)    PHYSICAL EXAM:    GENERAL: patient appears well, no acute distress, appropriate, pleasant  EYES: sclera clear, no exudates  ENMT: oropharynx clear without erythema, no exudates, moist mucous membranes  LUNGS: decreased BS b/l, clear to auscultation, symmetric breath sounds, no wheezing or rhonchi appreciated  HEART: soft S1/S2, regular rate and rhythm, no lower extremity edema  GASTROINTESTINAL: abdomen is soft, nontender, mildly distended(pt states it his chronic), normoactive bowel sounds  INTEGUMENT: good skin turgor, warm skin, appears well perfused; scar on later left thigh  MUSCULOSKELETAL: no clubbing or cyanosis, normal ROM of left LLE; no ttp of LLE  NEUROLOGIC: awake, alert, oriented x3, good muscle tone in 4 extremities, no obvious sensory deficits  PSYCHIATRIC: mood is good, affect is congruent, linear and logical thought process  HEME/LYMPH: no palpable supraclavicular nodules, no obvious ecchymosis or petechiae    LABS:                        11.1   7.49  )-----------( 334      ( 18 Dec 2021 04:53 )             35.1     CBC Full  -  ( 18 Dec 2021 04:53 )  WBC Count : 7.49 K/uL  Hemoglobin : 11.1 g/dL  Hematocrit : 35.1 %  Platelet Count - Automated : 334 K/uL  Mean Cell Volume : 86.5 fl  Mean Cell Hemoglobin : 27.3 pg  Mean Cell Hemoglobin Concentration : 31.6 gm/dL  Auto Neutrophil # : 5.25 K/uL  Auto Lymphocyte # : 1.25 K/uL  Auto Monocyte # : 0.88 K/uL  Auto Eosinophil # : 0.04 K/uL  Auto Basophil # : 0.02 K/uL  Auto Neutrophil % : 70.1 %  Auto Lymphocyte % : 16.7 %  Auto Monocyte % : 11.7 %  Auto Eosinophil % : 0.5 %  Auto Basophil % : 0.3 %    17 Dec 2021 17:43    138    |  103    |  26     ----------------------------<  162    5.0     |  28     |  1.60     Ca    10.2       17 Dec 2021 17:43    TPro  7.7    /  Alb  2.9    /  TBili  0.2    /  DBili  x      /  AST  15     /  ALT  13     /  AlkPhos  102    17 Dec 2021 17:43    PT/INR - ( 18 Dec 2021 04:53 )   PT: 13.4 sec;   INR: 1.15 ratio         PTT - ( 18 Dec 2021 04:53 )  PTT:36.9 sec    CAPILLARY BLOOD GLUCOSE      POCT Blood Glucose.: 195 mg/dL (18 Dec 2021 12:12)  POCT Blood Glucose.: 193 mg/dL (18 Dec 2021 06:52)  POCT Blood Glucose.: 152 mg/dL (17 Dec 2021 20:41)          RADIOLOGY & ADDITIONAL TESTS:    Personally reviewed.     < from: CT Femur No Cont, Left (12.17.21 @ 22:24) >      < end of copied text >      Consultant(s) Notes Reviewed:  [x] YES  [ ] NO

## 2021-12-19 LAB
ALBUMIN SERPL ELPH-MCNC: 2.5 G/DL — LOW (ref 3.3–5)
ALP SERPL-CCNC: 85 U/L — SIGNIFICANT CHANGE UP (ref 40–120)
ALT FLD-CCNC: 9 U/L — LOW (ref 12–78)
ANION GAP SERPL CALC-SCNC: 7 MMOL/L — SIGNIFICANT CHANGE UP (ref 5–17)
AST SERPL-CCNC: 11 U/L — LOW (ref 15–37)
BILIRUB SERPL-MCNC: 0.4 MG/DL — SIGNIFICANT CHANGE UP (ref 0.2–1.2)
BUN SERPL-MCNC: 23 MG/DL — SIGNIFICANT CHANGE UP (ref 7–23)
CALCIUM SERPL-MCNC: 9.3 MG/DL — SIGNIFICANT CHANGE UP (ref 8.5–10.1)
CHLORIDE SERPL-SCNC: 105 MMOL/L — SIGNIFICANT CHANGE UP (ref 96–108)
CO2 SERPL-SCNC: 30 MMOL/L — SIGNIFICANT CHANGE UP (ref 22–31)
CREAT SERPL-MCNC: 1.4 MG/DL — HIGH (ref 0.5–1.3)
CRP SERPL-MCNC: 81 MG/L — HIGH
ERYTHROCYTE [SEDIMENTATION RATE] IN BLOOD: 60 MM/HR — HIGH (ref 0–20)
GLUCOSE SERPL-MCNC: 157 MG/DL — HIGH (ref 70–99)
HCT VFR BLD CALC: 37.5 % — LOW (ref 39–50)
HGB BLD-MCNC: 11.6 G/DL — LOW (ref 13–17)
MCHC RBC-ENTMCNC: 27 PG — SIGNIFICANT CHANGE UP (ref 27–34)
MCHC RBC-ENTMCNC: 30.9 GM/DL — LOW (ref 32–36)
MCV RBC AUTO: 87.4 FL — SIGNIFICANT CHANGE UP (ref 80–100)
NRBC # BLD: 0 /100 WBCS — SIGNIFICANT CHANGE UP (ref 0–0)
PLATELET # BLD AUTO: 334 K/UL — SIGNIFICANT CHANGE UP (ref 150–400)
POTASSIUM SERPL-MCNC: 3.9 MMOL/L — SIGNIFICANT CHANGE UP (ref 3.5–5.3)
POTASSIUM SERPL-SCNC: 3.9 MMOL/L — SIGNIFICANT CHANGE UP (ref 3.5–5.3)
PROT SERPL-MCNC: 6.9 G/DL — SIGNIFICANT CHANGE UP (ref 6–8.3)
RBC # BLD: 4.29 M/UL — SIGNIFICANT CHANGE UP (ref 4.2–5.8)
RBC # FLD: 13.2 % — SIGNIFICANT CHANGE UP (ref 10.3–14.5)
SODIUM SERPL-SCNC: 142 MMOL/L — SIGNIFICANT CHANGE UP (ref 135–145)
WBC # BLD: 7.67 K/UL — SIGNIFICANT CHANGE UP (ref 3.8–10.5)
WBC # FLD AUTO: 7.67 K/UL — SIGNIFICANT CHANGE UP (ref 3.8–10.5)

## 2021-12-19 PROCEDURE — 99233 SBSQ HOSP IP/OBS HIGH 50: CPT | Mod: GC

## 2021-12-19 PROCEDURE — 99232 SBSQ HOSP IP/OBS MODERATE 35: CPT

## 2021-12-19 PROCEDURE — 93010 ELECTROCARDIOGRAM REPORT: CPT

## 2021-12-19 RX ORDER — INSULIN LISPRO 100/ML
VIAL (ML) SUBCUTANEOUS AT BEDTIME
Refills: 0 | Status: DISCONTINUED | OUTPATIENT
Start: 2021-12-19 | End: 2021-12-28

## 2021-12-19 RX ORDER — INSULIN LISPRO 100/ML
VIAL (ML) SUBCUTANEOUS
Refills: 0 | Status: DISCONTINUED | OUTPATIENT
Start: 2021-12-19 | End: 2021-12-28

## 2021-12-19 RX ORDER — LANOLIN ALCOHOL/MO/W.PET/CERES
5 CREAM (GRAM) TOPICAL AT BEDTIME
Refills: 0 | Status: DISCONTINUED | OUTPATIENT
Start: 2021-12-19 | End: 2021-12-30

## 2021-12-19 RX ORDER — SODIUM CHLORIDE 9 MG/ML
1000 INJECTION INTRAMUSCULAR; INTRAVENOUS; SUBCUTANEOUS
Refills: 0 | Status: DISCONTINUED | OUTPATIENT
Start: 2021-12-19 | End: 2021-12-24

## 2021-12-19 RX ADMIN — TIOTROPIUM BROMIDE 1 CAPSULE(S): 18 CAPSULE ORAL; RESPIRATORY (INHALATION) at 07:51

## 2021-12-19 RX ADMIN — CLOPIDOGREL BISULFATE 75 MILLIGRAM(S): 75 TABLET, FILM COATED ORAL at 11:47

## 2021-12-19 RX ADMIN — BUDESONIDE AND FORMOTEROL FUMARATE DIHYDRATE 2 PUFF(S): 160; 4.5 AEROSOL RESPIRATORY (INHALATION) at 07:51

## 2021-12-19 RX ADMIN — Medication 4 MILLIGRAM(S): at 06:13

## 2021-12-19 RX ADMIN — Medication 300 MILLIGRAM(S): at 06:13

## 2021-12-19 RX ADMIN — BUDESONIDE AND FORMOTEROL FUMARATE DIHYDRATE 2 PUFF(S): 160; 4.5 AEROSOL RESPIRATORY (INHALATION) at 22:27

## 2021-12-19 RX ADMIN — Medication 50 MILLIGRAM(S): at 06:13

## 2021-12-19 RX ADMIN — Medication 650 MILLIGRAM(S): at 14:51

## 2021-12-19 RX ADMIN — SODIUM CHLORIDE 60 MILLILITER(S): 9 INJECTION INTRAMUSCULAR; INTRAVENOUS; SUBCUTANEOUS at 11:48

## 2021-12-19 RX ADMIN — CEFEPIME 100 MILLIGRAM(S): 1 INJECTION, POWDER, FOR SOLUTION INTRAMUSCULAR; INTRAVENOUS at 18:11

## 2021-12-19 RX ADMIN — Medication 2: at 07:50

## 2021-12-19 RX ADMIN — CEFEPIME 100 MILLIGRAM(S): 1 INJECTION, POWDER, FOR SOLUTION INTRAMUSCULAR; INTRAVENOUS at 06:12

## 2021-12-19 RX ADMIN — Medication 6: at 11:47

## 2021-12-19 RX ADMIN — Medication 650 MILLIGRAM(S): at 13:51

## 2021-12-19 RX ADMIN — TAMSULOSIN HYDROCHLORIDE 0.4 MILLIGRAM(S): 0.4 CAPSULE ORAL at 22:26

## 2021-12-19 RX ADMIN — Medication 4: at 22:27

## 2021-12-19 RX ADMIN — ATORVASTATIN CALCIUM 80 MILLIGRAM(S): 80 TABLET, FILM COATED ORAL at 22:26

## 2021-12-19 NOTE — PROGRESS NOTE ADULT - SUBJECTIVE AND OBJECTIVE BOX
[facility]  INFECTIOUS DISEASES   [address] [place] [address]  Tel: [phone]     Fax: [phone]  ======================================================  MD Noman Perry Kaushal, MD Cho, Michelle, MD   ======================================================    [facility]-[id number]  [facility]     Follow up: Left femoral OM and intramedullary abscess     No fever, no new complaint, no new changes.   Seen by ortho for possible IR drainage and drain placement.     PAST MEDICAL & SURGICAL HISTORY:  Diabetes Mellitus, Type II  CAD (Coronary Artery Disease)  s/p 3v CABG ; stents placed in [place] in   Dyslipidemia  Osteomyelitis  COPD (chronic obstructive pulmonary disease)  on 2L at home and BiPAP at night; intubated   Hypertension  PVD (peripheral vascular disease)  History of PAT (paroxysmal atrial tachycardia)  CABG (Coronary Artery Bypass Graft)    Compound fracture  left leg  S/P primary angioplasty with coronary stent    Social Hx: Former smoker, no ETOH or drugs     FAMILY HISTORY:  Family history of diabetes mellitus (Sibling)  Family hx of lung cancer  brother,  age 82, used to smoke with pt  Allergies  No Known Allergies    Intolerances  shellfish (Nausea)    Antibiotics:  MEDICATIONS  (STANDING):  atorvastatin 80 milliGRAM(s) Oral at bedtime  azithromycin   Tablet 250 milliGRAM(s) Oral <User Schedule>  budesonide 160 MICROgram(s)/formoterol 4.5 MICROgram(s) Inhaler 2 Puff(s) Inhalation two times a day  cefepime   IVPB 2000 milliGRAM(s) IV Intermittent every 12 hours  clopidogrel Tablet 75 milliGRAM(s) Oral daily  dextrose 40% Gel 15 Gram(s) Oral once  dextrose 5%. 1000 milliLiter(s) (50 mL/Hr) IV Continuous <Continuous>  dextrose 5%. 1000 milliLiter(s) (100 mL/Hr) IV Continuous <Continuous>  dextrose 50% Injectable 25 Gram(s) IV Push once  dextrose 50% Injectable 12.5 Gram(s) IV Push once  dextrose 50% Injectable 25 Gram(s) IV Push once  glucagon  Injectable 1 milliGRAM(s) IntraMuscular once  insulin lispro (ADMELOG) corrective regimen sliding scale   SubCutaneous three times a day before meals  insulin lispro (ADMELOG) corrective regimen sliding scale   SubCutaneous at bedtime  metoprolol tartrate 50 milliGRAM(s) Oral two times a day  predniSONE   Tablet 4 milliGRAM(s) Oral daily  sodium chloride 0.9%. 1000 milliLiter(s) (60 mL/Hr) IV Continuous <Continuous>  tamsulosin 0.4 milliGRAM(s) Oral at bedtime  tiotropium 18 MICROgram(s) Capsule 1 Capsule(s) Inhalation daily  vancomycin  IVPB 1500 milliGRAM(s) IV Intermittent every 24 hours     REVIEW OF SYSTEMS:  CONSTITUTIONAL:  No Fever or chills  HEENT:  No diplopia or blurred vision.  No sore throat or runny nose.  CARDIOVASCULAR:  No chest pain or SOB.  RESPIRATORY:  No cough, shortness of breath, PND or orthopnea.  GASTROINTESTINAL:  No nausea, vomiting or diarrhea.  GENITOURINARY:  No dysuria, frequency or urgency. No Blood in urine  MUSCULOSKELETAL: left leg pain   SKIN:  No change in skin, hair or nails.  NEUROLOGIC:  No paresthesias, fasciculations, seizures or weakness.  PSYCHIATRIC:  No disorder of thought or mood.  ENDOCRINE:  No heat or cold intolerance, polyuria or polydipsia.  HEMATOLOGICAL:  No easy bruising or bleeding.     Physical Exam:  Vital Signs Last 24 Hrs  T(C): 36.9 (19 Dec 2021 04:56), Max: 37.4 (18 Dec 2021 22:39)  T(F): 98.4 (19 Dec 2021 04:56), Max: 99.3 (18 Dec 2021 22:39)  HR: 94 (19 Dec 2021 04:56) (66 - 101)  BP: 111/72 (19 Dec 2021 04:56) (111/72 - 157/80)  BP(mean): --  RR: 18 (19 Dec 2021 04:56) (18 - 18)  SpO2: 94% (19 Dec 2021 04:56) (94% - 100%)  GEN: NAD, obese   HEENT: normocephalic and atraumatic. EOMI. PERRL.    NECK: Supple.  No lymphadenopathy   LUNGS: Clear to auscultation.  HEART: Regular rate and rhythm   ABDOMEN: Soft, nontender, and nondistended.  Positive bowel sounds.    : No CVA tenderness  EXTREMITIES: left leg in thigh area has a scar in lateral side with no discharge or open wound.  Swelling in anterior part, no tenderness or fluctuation. No sign of cellulitis on soft tissue.   NEUROLOGIC: grossly intact.  PSYCHIATRIC: Appropriate affect .  SKIN: No rash, as above     Labs:                        11.6   7.67  )-----------( 334      ( 19 Dec 2021 09:06 )             37.5         142  |  105  |  23  ----------------------------<  157<H>  3.9   |  30  |  1.40<H>    Ca    9.3      19 Dec 2021 09:06    TPro  6.9  /  Alb  2.5<L>  /  TBili  0.4  /  DBili  x   /  AST  11<L>  /  ALT  9<L>  /  AlkPhos  85      Culture - Blood (collected 21 @ 21:22)  Source: .Blood Blood    Culture - Blood (collected 21 @ 21:22)  Source: .Blood Blood    WBC Count: 7.67 K/uL (21 @ 09:06)  WBC Count: 7.49 K/uL (21 @ 04:53)  WBC Count: 8.95 K/uL (21 @ 17:43)    Creatinine, Serum: 1.40 mg/dL (21 @ 09:06)  Creatinine, Serum: 1.50 mg/dL (21 @ 13:41)  Creatinine, Serum: 1.60 mg/dL (21 @ 17:43)    C-Reactive Protein, Serum: 70 mg/L (21 @ 17:04)    Sedimentation Rate, Erythrocyte: 60 mm/hr (21 @ 09:06)  Sedimentation Rate, Erythrocyte: 42 mm/hr (21 @ 10:44)    COVID-19 PCR: NotDetec (21 @ 17:43)    All imaging and other data have been reviewed.  < from: CT Femur No Cont, Left (21 @ 22:24) >  IMPRESSION:  Old healed deformity of the mid left femur likely correlating with the   history of a prior osteomyelitis. Complex heterogeneous lobulated soft   tissue anterior and lateral to the middle two thirds of the left femur   concerning for an abscess. New lobulated intramedullary defect with tract   extending to the cortical surface in the mid left femur concerning for a   possible intramedullary abscess. A contrast-enhancedMRI of the left   femur is recommended for further evaluation.    Assessment and Plan:   80yo man with PMH of HTN, COPD on home O2, CAD s/p CABG, PVD s/p stents in b/l legs and left femoral fracture more than 50 years ago, was admitted with left leg pain on the site of old fracture after CT showed possible intramedullary abscess. He has had pain and infection in the old fracture area at least 3-4 times in the past, had multiple surgeries and drainage of area with a long term antibiotic treatment, last one years ago.   Most likely another infection and osteomyelitis with collection in area that needs intervention by ortho. Will cover empirically with 4th Gen cephalosporins and vancomycin until we have culture info.   Doppler neg for DVTs  Admission WBC normal, no fever  ESR=60     CRP=70    1- Left femoral OM and intramedullary abscess   - Blood cultures NGTD   - Ortho consult noted, will need IR drain placment, please send for cultures and pathology   - Continue cefepime 2gm q12 adjusted for renal function   - Continue vancomycin 1500mg daily, will send trough as per pharmacy protocol and keep 15 to 20  - Follow creatinine to adjust ABx doses, mild MERRILL/CKD, creat improving     2- COPD  - Continue azithromycin 250mg 3times weekly prophylactically   - Pulmonary consult noted  - On o2 and BiPAP at night time      Will follow.  Discussed with the primary team.     Brandi العلي MD  Division of Infectious Diseases   Cell [phone] between 8am and 6pm   After 6pm and weekends please call ID service at [phone].     >30 minutes spent on total encounter assessing patient, examination, chart reivew, counseling and coordinating care by the attending physician/nurse/care manager.

## 2021-12-19 NOTE — PROGRESS NOTE ADULT - ASSESSMENT
80yo male with pmhx CAD s/p 3v CABG 2004, stents in 2019, HLD, HTN, PAT(on Eliquis), PVD(s/p stents in b/l legs -- right leg in 2020), osteomyelitis, COPD on prn home o2 and nocturnal bipap, type 2 Dm  presents to ED with leg pain x 1 week , found to have CT findings c/w abscess in left femur     cardio eval noted  ortho f/u  planned for I and D at [facility] is on diversion - unable to accept patient    copd rx regimen - inhalers - prn o2 support  jacy - NIPPV  keep sat > 88 pct  Bone infection eval - OM - ct imaging reviewed -   ID eval   monitor VS and HD and Sat  cad - htn - cvs rx regimen and BP control  off AC -   on Anti Platelet rx regimen  pain assessment  GOC discussion  Dr Dejesus will take over care tomorrow  supportive medical regimen in progress  work up for acute infectious etiol in progress  old records reviewed

## 2021-12-19 NOTE — PROGRESS NOTE ADULT - SUBJECTIVE AND OBJECTIVE BOX
Patient is a 81y old  Male who presents with a chief complaint of L femur r/o abscess (18 Dec 2021 13:18)      INTERVAL HPI/OVERNIGHT EVENTS: Patient has resting comfortably in bed, feeling well. No overnight events occurred.  Patient denies pain in left femur. Denies fevers, chills, headache, lightheadedness, chest pain, dyspnea, abdominal pain, nausea, vomiting, diarrhea, constipation.      MEDICATIONS  (STANDING):  atorvastatin 80 milliGRAM(s) Oral at bedtime  azithromycin   Tablet 250 milliGRAM(s) Oral <User Schedule>  budesonide 160 MICROgram(s)/formoterol 4.5 MICROgram(s) Inhaler 2 Puff(s) Inhalation two times a day  cefepime   IVPB 2000 milliGRAM(s) IV Intermittent every 12 hours  clopidogrel Tablet 75 milliGRAM(s) Oral daily  dextrose 40% Gel 15 Gram(s) Oral once  dextrose 5%. 1000 milliLiter(s) (50 mL/Hr) IV Continuous <Continuous>  dextrose 5%. 1000 milliLiter(s) (100 mL/Hr) IV Continuous <Continuous>  dextrose 50% Injectable 25 Gram(s) IV Push once  dextrose 50% Injectable 12.5 Gram(s) IV Push once  dextrose 50% Injectable 25 Gram(s) IV Push once  glucagon  Injectable 1 milliGRAM(s) IntraMuscular once  insulin lispro (ADMELOG) corrective regimen sliding scale   SubCutaneous three times a day before meals  insulin lispro (ADMELOG) corrective regimen sliding scale   SubCutaneous at bedtime  metoprolol tartrate 50 milliGRAM(s) Oral two times a day  predniSONE   Tablet 4 milliGRAM(s) Oral daily  sodium chloride 0.9%. 1000 milliLiter(s) (60 mL/Hr) IV Continuous <Continuous>  tamsulosin 0.4 milliGRAM(s) Oral at bedtime  tiotropium 18 MICROgram(s) Capsule 1 Capsule(s) Inhalation daily  vancomycin  IVPB 1500 milliGRAM(s) IV Intermittent every 24 hours    MEDICATIONS  (PRN):  acetaminophen     Tablet .. 650 milliGRAM(s) Oral every 6 hours PRN Mild Pain (1 - 3), Moderate Pain (4 - 6)      Allergies    No Known Allergies    Intolerances    shellfish (Nausea)      REVIEW OF SYSTEMS:  CONSTITUTIONAL: No fever or chills  HEENT:  No headache, no sore throat  RESPIRATORY: No cough, wheezing, or shortness of breath  CARDIOVASCULAR: No chest pain, palpitations  GASTROINTESTINAL: No abd pain, nausea, vomiting, or diarrhea  GENITOURINARY: No dysuria, frequency, or hematuria  NEUROLOGICAL: no focal weakness or dizziness  MUSCULOSKELETAL: no myalgias, admits anterolateral left thigh pain on ambulation    Vital Signs Last 24 Hrs  T(C): 36.9 (19 Dec 2021 04:56), Max: 37.4 (18 Dec 2021 22:39)  T(F): 98.4 (19 Dec 2021 04:56), Max: 99.3 (18 Dec 2021 22:39)  HR: 94 (19 Dec 2021 04:56) (66 - 101)  BP: 111/72 (19 Dec 2021 04:56) (111/72 - 157/80)  BP(mean): --  RR: 18 (19 Dec 2021 04:56) (18 - 18)  SpO2: 94% (19 Dec 2021 04:56) (94% - 100%)    PHYSICAL EXAM:  GENERAL: NAD  HEENT:  anicteric, moist mucous membranes  CHEST/LUNG:  decreased breath sounds b/l, no rales, wheezes, or rhonchi  HEART:  RRR, S1, S2  ABDOMEN:  BS+, soft, nontender, mild distension  EXTREMITIES: no edema, cyanosis, or calf tenderness. Vertical scar on L lateral thigh, no TTP  NERVOUS SYSTEM: answers questions and follows commands appropriately    LABS:                        11.6   7.67  )-----------( 334      ( 19 Dec 2021 09:06 )             37.5     CBC Full  -  ( 19 Dec 2021 09:06 )  WBC Count : 7.67 K/uL  Hemoglobin : 11.6 g/dL  Hematocrit : 37.5 %  Platelet Count - Automated : 334 K/uL  Mean Cell Volume : 87.4 fl  Mean Cell Hemoglobin : 27.0 pg  Mean Cell Hemoglobin Concentration : 30.9 gm/dL  Auto Neutrophil # : x  Auto Lymphocyte # : x  Auto Monocyte # : x  Auto Eosinophil # : x  Auto Basophil # : x  Auto Neutrophil % : x  Auto Lymphocyte % : x  Auto Monocyte % : x  Auto Eosinophil % : x  Auto Basophil % : x    19 Dec 2021 09:06    142    |  105    |  23     ----------------------------<  157    3.9     |  30     |  1.40     Ca    9.3        19 Dec 2021 09:06    TPro  6.9    /  Alb  2.5    /  TBili  0.4    /  DBili  x      /  AST  11     /  ALT  9      /  AlkPhos  85     19 Dec 2021 09:06    PT/INR - ( 18 Dec 2021 04:53 )   PT: 13.4 sec;   INR: 1.15 ratio         PTT - ( 18 Dec 2021 04:53 )  PTT:36.9 sec    CAPILLARY BLOOD GLUCOSE      POCT Blood Glucose.: 254 mg/dL (19 Dec 2021 11:45)  POCT Blood Glucose.: 202 mg/dL (19 Dec 2021 07:42)  POCT Blood Glucose.: 197 mg/dL (18 Dec 2021 22:43)  POCT Blood Glucose.: 122 mg/dL (18 Dec 2021 18:09)        Culture - Blood (collected 12-17-21 @ 21:22)  Source: .Blood Blood  Preliminary Report (12-18-21 @ 22:01):    No growth to date.    Culture - Blood (collected 12-17-21 @ 21:22)  Source: .Blood Blood  Preliminary Report (12-18-21 @ 22:01):    No growth to date.        RADIOLOGY & ADDITIONAL TESTS:    Personally reviewed.     Consultant(s) Notes Reviewed:  [x] YES  [ ] NO     Patient is a 81y old  Male who presents with a chief complaint of L femur r/o abscess (18 Dec 2021 13:18)      INTERVAL HPI/OVERNIGHT EVENTS: Patient has resting comfortably in bed, feeling well. No overnight events occurred. Patient denies pain in left femur. Denies fevers, chills, headache, lightheadedness, chest pain, dyspnea, abdominal pain, nausea, vomiting, diarrhea, constipation.      MEDICATIONS  (STANDING):  atorvastatin 80 milliGRAM(s) Oral at bedtime  azithromycin   Tablet 250 milliGRAM(s) Oral <User Schedule>  budesonide 160 MICROgram(s)/formoterol 4.5 MICROgram(s) Inhaler 2 Puff(s) Inhalation two times a day  cefepime   IVPB 2000 milliGRAM(s) IV Intermittent every 12 hours  clopidogrel Tablet 75 milliGRAM(s) Oral daily  dextrose 40% Gel 15 Gram(s) Oral once  dextrose 5%. 1000 milliLiter(s) (50 mL/Hr) IV Continuous <Continuous>  dextrose 5%. 1000 milliLiter(s) (100 mL/Hr) IV Continuous <Continuous>  dextrose 50% Injectable 25 Gram(s) IV Push once  dextrose 50% Injectable 12.5 Gram(s) IV Push once  dextrose 50% Injectable 25 Gram(s) IV Push once  glucagon  Injectable 1 milliGRAM(s) IntraMuscular once  insulin lispro (ADMELOG) corrective regimen sliding scale   SubCutaneous three times a day before meals  insulin lispro (ADMELOG) corrective regimen sliding scale   SubCutaneous at bedtime  metoprolol tartrate 50 milliGRAM(s) Oral two times a day  predniSONE   Tablet 4 milliGRAM(s) Oral daily  sodium chloride 0.9%. 1000 milliLiter(s) (60 mL/Hr) IV Continuous <Continuous>  tamsulosin 0.4 milliGRAM(s) Oral at bedtime  tiotropium 18 MICROgram(s) Capsule 1 Capsule(s) Inhalation daily  vancomycin  IVPB 1500 milliGRAM(s) IV Intermittent every 24 hours    MEDICATIONS  (PRN):  acetaminophen     Tablet .. 650 milliGRAM(s) Oral every 6 hours PRN Mild Pain (1 - 3), Moderate Pain (4 - 6)      Allergies    No Known Allergies    Intolerances    shellfish (Nausea)      REVIEW OF SYSTEMS:  CONSTITUTIONAL: No fever or chills  HEENT:  No headache, no sore throat  RESPIRATORY: No cough, wheezing, or shortness of breath  CARDIOVASCULAR: No chest pain, palpitations  GASTROINTESTINAL: No abd pain, nausea, vomiting, or diarrhea  GENITOURINARY: No dysuria, frequency, or hematuria  NEUROLOGICAL: no focal weakness or dizziness  MUSCULOSKELETAL: no myalgias, admits anterolateral left thigh pain on ambulation    Vital Signs Last 24 Hrs  T(C): 36.9 (19 Dec 2021 04:56), Max: 37.4 (18 Dec 2021 22:39)  T(F): 98.4 (19 Dec 2021 04:56), Max: 99.3 (18 Dec 2021 22:39)  HR: 94 (19 Dec 2021 04:56) (66 - 101)  BP: 111/72 (19 Dec 2021 04:56) (111/72 - 157/80)  BP(mean): --  RR: 18 (19 Dec 2021 04:56) (18 - 18)  SpO2: 94% (19 Dec 2021 04:56) (94% - 100%)    PHYSICAL EXAM:  GENERAL: NAD  HEENT:  anicteric, moist mucous membranes  CHEST/LUNG:  decreased breath sounds b/l, no rales, wheezes, or rhonchi  HEART:  RRR, S1, S2  ABDOMEN:  obese, BS+, soft, nontender, mild distension  EXTREMITIES: no edema, cyanosis, or calf tenderness. Vertical scar on L lateral thigh, no TTP  NERVOUS SYSTEM: answers questions and follows commands appropriately    LABS:                        11.6   7.67  )-----------( 334      ( 19 Dec 2021 09:06 )             37.5     CBC Full  -  ( 19 Dec 2021 09:06 )  WBC Count : 7.67 K/uL  Hemoglobin : 11.6 g/dL  Hematocrit : 37.5 %  Platelet Count - Automated : 334 K/uL  Mean Cell Volume : 87.4 fl  Mean Cell Hemoglobin : 27.0 pg  Mean Cell Hemoglobin Concentration : 30.9 gm/dL  Auto Neutrophil # : x  Auto Lymphocyte # : x  Auto Monocyte # : x  Auto Eosinophil # : x  Auto Basophil # : x  Auto Neutrophil % : x  Auto Lymphocyte % : x  Auto Monocyte % : x  Auto Eosinophil % : x  Auto Basophil % : x    19 Dec 2021 09:06    142    |  105    |  23     ----------------------------<  157    3.9     |  30     |  1.40     Ca    9.3        19 Dec 2021 09:06    TPro  6.9    /  Alb  2.5    /  TBili  0.4    /  DBili  x      /  AST  11     /  ALT  9      /  AlkPhos  85     19 Dec 2021 09:06    PT/INR - ( 18 Dec 2021 04:53 )   PT: 13.4 sec;   INR: 1.15 ratio         PTT - ( 18 Dec 2021 04:53 )  PTT:36.9 sec    CAPILLARY BLOOD GLUCOSE      POCT Blood Glucose.: 254 mg/dL (19 Dec 2021 11:45)  POCT Blood Glucose.: 202 mg/dL (19 Dec 2021 07:42)  POCT Blood Glucose.: 197 mg/dL (18 Dec 2021 22:43)  POCT Blood Glucose.: 122 mg/dL (18 Dec 2021 18:09)        Culture - Blood (collected 12-17-21 @ 21:22)  Source: .Blood Blood  Preliminary Report (12-18-21 @ 22:01):    No growth to date.    Culture - Blood (collected 12-17-21 @ 21:22)  Source: .Blood Blood  Preliminary Report (12-18-21 @ 22:01):    No growth to date.        RADIOLOGY & ADDITIONAL TESTS: < from: CT Femur No Cont, Left (12.17.21 @ 22:24) >    ACC: [id number] EXAM:  CT FEMUR ONLY LT                          PROCEDURE DATE:  12/17/2021          INTERPRETATION:  CLINICAL INFORMATION: Left leg pain. History of fracture   and osteomyelitis.    TECHNIQUE: CT of the left femur was performed in bone and soft tissue   windows with coronal and sagittal reformats. No intravenous contrast was   administered.    COMPARISON: CT of bilateral femurs from 11/6/2010.    FINDINGS:    Bone: An old healed deformity of the left mid femur is not significantly   changed. An old left femoral intramedullary dominick tract is noted. No acute   fracture or dislocation is demonstrated. Heterotopic ossification   superior to the left greater trochanter is not significantly changed. A   new lobulated 2.2 x 1.8 cm intramedullary defect with tract to the   lateral cortical surface is seen in the mid femur. Degenerative changes   of the left knee is noted.    Soft tissues: Complex heterogeneous lobulated soft tissue measuring 6.9 x   6.9 x 18.3 cm is seen anterior and lateral to the femur involving the   middle two thirds of the femur with mild adjacent inflammatory change.   The vastus intermedialis and lateralis musculature is markedly atrophic.   Vascular calcifications are noted. Bilateral external iliac artery and   left mid to distal femoral artery stents are seen.    IMPRESSION:    Old healed deformity of the mid left femur likely correlating with the   history of a prior osteomyelitis. Complex heterogeneous lobulated soft   tissue anterior and lateral to the middle two thirds of the left femur   concerning for an abscess. New lobulated intramedullary defect with tract   extending to the cortical surface in the mid left femur concerning for a   possible intramedullary abscess. A contrast-enhancedMRI of the left   femur is recommended for further evaluation.    --- End of Report ---            PAZ CHUA MD; Attending Radiologist  This document has been electronically signed. Dec 17 2021 11:20PM    < end of copied text >      Personally reviewed.     Consultant(s) Notes Reviewed:  [x] YES  [ ] NO

## 2021-12-19 NOTE — PROGRESS NOTE ADULT - ATTENDING COMMENTS
80yo male with pmhx CAD s/p 3v CABG 2004, stents in 2019, HLD, HTN, PAT(on Eliquis), PVD(s/p stents in b/l legs -- right leg in 2020), osteomyelitis, COPD on prn home o2 and nocturnal bipap, type 2 Dm  presents to ED with leg pain x 1 week , found to have CT findings c/w abscess in left femur Plan: cont iv antibx, apprec ID and ortho collaboration in care, MR with contrast to follow and poss IR procedure vs surgery, monitor clinical course, apprec cardio recs and optimization is surgery needed. Eliquis on hold in anticipation of IR procedure but not confirmed. Unable to reach anyone at IR due to weekend. Will be off AC for 3 days as of tomorrow, consider starting full dose AC if no plans for immediate intervention. Need to clarify Plavix with IR as patient has been on it since admission. D/w cardio, at discretion of IR. ? ST elevations on tele. EKG ordered, will d/w cardio

## 2021-12-19 NOTE — PROGRESS NOTE ADULT - PROBLEM SELECTOR PLAN 1
Intramedullary Abscess   - pt with h/o compound fracture/osteomyelitis of left femur. now p/w left leg pain.   - CT revealed Old healed deformity of the mid left femur , Complex heterogeneous lobulated soft  tissue anterior and lateral to the middle two thirds of the left femur   concerning for an abscess. New lobulated intramedullary defect with tract   extending to the cortical surface in the mid left femur concerning for a   possible intramedullary abscess.   - tylenol for pain  - BCx x2 NGTD  - Doppler negative for DVT  - Afebrile, no leukocytosis. monitor for fever. trend daily cbc with diff   - s/p vancomycin & Zosyn x 1  - Now on cefepime, vanc   - imaging studies performed non contrast due to pt renal function.   - Ortho consulted, recommending MR w and wo to further assess the abscess.   - ID, Dr. العلي, consulted  -Nephro consulted for recommendations on using contrast with patient in [facility]  - FU w/ IR, on Monday may take patient for drain, then can be placed on chronic suppressive therapy with IV Abx Intramedullary Abscess   - pt with h/o compound fracture/osteomyelitis of left femur. now p/w left leg pain.   - CT revealed Old healed deformity of the mid left femur , Complex heterogeneous lobulated soft  tissue anterior and lateral to the middle two thirds of the left femur concerning for an abscess. New lobulated intramedullary defect with tract extending to the cortical surface in the mid left femur concerning for a possible intramedullary abscess.   - tylenol for pain  - BCx x2 NGTD  - Doppler negative for DVT  - Afebrile, no leukocytosis. monitor for fever. trend daily cbc with diff   - s/p vancomycin & Zosyn x 1  - Now on cefepime, vanc   - imaging studies performed non contrast due to pt renal function.   - Ortho consulted, recommending MR w and wo to further assess the abscess.   - ID, Dr. العلي, consulted  -Nephro consulted for recommendations on using contrast with patient in [facility]  - FU w/ IR, on Monday may take patient for drain, then can be placed on chronic suppressive therapy with IV Abx

## 2021-12-19 NOTE — PROGRESS NOTE ADULT - SUBJECTIVE AND OBJECTIVE BOX
Patient seen and examined at bedside. Nasal cannula in place. Pain with ambulation of LLE. Denies pain when at rest. No visible draining sinus tract. Denies subjective f/c.     Vital Signs Last 24 Hrs  T(C): 36.9 (19 Dec 2021 04:56), Max: 37.4 (18 Dec 2021 22:39)  T(F): 98.4 (19 Dec 2021 04:56), Max: 99.3 (18 Dec 2021 22:39)  HR: 94 (19 Dec 2021 04:56) (66 - 101)  BP: 111/72 (19 Dec 2021 04:56) (111/72 - 157/80)  BP(mean): --  RR: 18 (19 Dec 2021 04:56) (18 - 18)  SpO2: 94% (19 Dec 2021 04:56) (94% - 100%)    Gen: NAD, Resting comfortably, aaox3  LLE  antereolateral surgical scar, well healed. Defect in muscle noted, no drainage. No palpable fluctuance. No defect in skin, no clear sinus tract visualized  No bony tenderness to palpation  +EHL/FHL/TA/GSC  +SILT L3-S1  + DP  Compartments soft and compressible  No calf tenderness    A/P: 81M/F w/ osteomyelitis, concern for intramedullary abscess    Patient's vitals are stable, no indication of sepsis  To further assess fluid collection/abscess, recommend  MR w and wo of left femur. Will speak with IR to discusse drainage and pigtail placement.   Will monitor clinical course and determine follow up with primary surgeon  Analgesia  NWB LLE  DVT ppx with scds for now, possible surgical intervention/IR drainage   Discussed with attending who is in agreement with above plan Patient seen and examined at bedside. Nasal cannula in place. Pain with ambulation of LLE. Denies pain when at rest. No visible draining sinus tract. Denies subjective f/c.     Vital Signs Last 24 Hrs  T(C): 36.9 (19 Dec 2021 04:56), Max: 37.4 (18 Dec 2021 22:39)  T(F): 98.4 (19 Dec 2021 04:56), Max: 99.3 (18 Dec 2021 22:39)  HR: 94 (19 Dec 2021 04:56) (66 - 101)  BP: 111/72 (19 Dec 2021 04:56) (111/72 - 157/80)  BP(mean): --  RR: 18 (19 Dec 2021 04:56) (18 - 18)  SpO2: 94% (19 Dec 2021 04:56) (94% - 100%)    Gen: NAD, Resting comfortably, aaox3  LLE  antereolateral surgical scar, well healed. Defect in muscle noted, no drainage. No palpable fluctuance. No defect in skin, no clear sinus tract visualized  No bony tenderness to palpation  +EHL/FHL/TA/GSC  +SILT L3-S1  + DP  Compartments soft and compressible  No calf tenderness    A/P: 81M/F w/ osteomyelitis, concern for intramedullary abscess    Patient's vitals are stable, no indication of sepsis  To further assess fluid collection/abscess, recommend  MR w and wo of left femur. Will speak with IR to discusse drainage and pigtail placement.   Will monitor clinical course  Analgesia  NWB LLE  DVT ppx with scds for now, possible surgical intervention/IR drainage   Discussed with attending who is in agreement with above plan Patient seen and examined at bedside. Nasal cannula in place. Pain with ambulation of LLE. Denies pain when at rest. No visible draining sinus tract. Denies subjective f/c.     Vital Signs Last 24 Hrs  T(C): 36.9 (19 Dec 2021 04:56), Max: 37.4 (18 Dec 2021 22:39)  T(F): 98.4 (19 Dec 2021 04:56), Max: 99.3 (18 Dec 2021 22:39)  HR: 94 (19 Dec 2021 04:56) (66 - 101)  BP: 111/72 (19 Dec 2021 04:56) (111/72 - 157/80)  BP(mean): --  RR: 18 (19 Dec 2021 04:56) (18 - 18)  SpO2: 94% (19 Dec 2021 04:56) (94% - 100%)    Gen: NAD, Resting comfortably, aaox3  LLE  antereolateral surgical scar, well healed. Defect in muscle noted, no drainage. No palpable fluctuance. No defect in skin, no clear sinus tract visualized  No bony tenderness to palpation  +EHL/FHL/TA/GSC  +SILT L3-S1  + DP  Compartments soft and compressible  No calf tenderness    A/P: 81M/F w/ osteomyelitis, concern for intramedullary abscess    Patient's vitals are stable, no indication of sepsis  To further assess fluid collection/abscess, recommend  MR w and wo of left femur. Will speak with IR to discusse drainage and pigtail placement.   Will monitor clinical course and continuation of care with Dr. Varela   Analgesia  NWB LLE  DVT ppx with scds for now, possible surgical intervention/IR drainage   Discussed with attending who is in agreement with above plan

## 2021-12-19 NOTE — PROGRESS NOTE ADULT - PROBLEM SELECTOR PLAN 4
- chronic  - hold home metformin and Jardiance  - Started on low dose IIS, increased to MDSS due to uncontrolled BG  - hypoglycemia protocol   - A1c 7.6

## 2021-12-19 NOTE — PROGRESS NOTE ADULT - PROBLEM SELECTOR PLAN 8
- CAD s/p 3v CABG 2004, stents in 2019  - continue beta blocker   - plavix continued; will reassess need to hold Plavix tomorrow am once IR plan solidified - if so, patient may need to start heparin gtt. - CAD s/p 3v CABG 2004, stents in 2019  - continue beta blocker   - plavix continued; will reassess need to hold Plavix tomorrow am once IR plan solidified   - On Eliquis for ? PAT, will be off AC for 3 days as of tomorrow, would start full dose AC if no plans for immediate intervention

## 2021-12-19 NOTE — PROGRESS NOTE ADULT - SUBJECTIVE AND OBJECTIVE BOX
Date/Time Patient Seen:  		  Referring MD:   Data Reviewed	       Patient is a 81y old  Male who presents with a chief complaint of L femur r/o abscess (18 Dec 2021 13:18)      Subjective/HPI     PAST MEDICAL & SURGICAL HISTORY:  Diabetes Mellitus, Type II    CAD (Coronary Artery Disease)  s/p 3v CABG 2004; stents placed in [place] in 2019    Dyslipidemia    Asymptomatic Peripheral Vascular Disease    Osteomyelitis    COPD (chronic obstructive pulmonary disease)  on 2L at home and BiPAP at night; intubated 6/18    Diabetes mellitus    Hypertension    PVD (peripheral vascular disease)    History of PAT (paroxysmal atrial tachycardia)    CABG (Coronary Artery Bypass Graft)  2004    Compound fracture  left leg    S/P primary angioplasty with coronary stent          Medication list         MEDICATIONS  (STANDING):  atorvastatin 80 milliGRAM(s) Oral at bedtime  azithromycin   Tablet 250 milliGRAM(s) Oral <User Schedule>  budesonide 160 MICROgram(s)/formoterol 4.5 MICROgram(s) Inhaler 2 Puff(s) Inhalation two times a day  cefepime   IVPB 2000 milliGRAM(s) IV Intermittent every 12 hours  clopidogrel Tablet 75 milliGRAM(s) Oral daily  dextrose 40% Gel 15 Gram(s) Oral once  dextrose 5%. 1000 milliLiter(s) (50 mL/Hr) IV Continuous <Continuous>  dextrose 5%. 1000 milliLiter(s) (100 mL/Hr) IV Continuous <Continuous>  dextrose 50% Injectable 25 Gram(s) IV Push once  dextrose 50% Injectable 12.5 Gram(s) IV Push once  dextrose 50% Injectable 25 Gram(s) IV Push once  glucagon  Injectable 1 milliGRAM(s) IntraMuscular once  insulin lispro (ADMELOG) corrective regimen sliding scale   SubCutaneous three times a day before meals  insulin lispro (ADMELOG) corrective regimen sliding scale   SubCutaneous at bedtime  metoprolol tartrate 50 milliGRAM(s) Oral two times a day  predniSONE   Tablet 4 milliGRAM(s) Oral daily  sodium chloride 0.9%. 1000 milliLiter(s) (60 mL/Hr) IV Continuous <Continuous>  tamsulosin 0.4 milliGRAM(s) Oral at bedtime  tiotropium 18 MICROgram(s) Capsule 1 Capsule(s) Inhalation daily  vancomycin  IVPB 1500 milliGRAM(s) IV Intermittent every 24 hours    MEDICATIONS  (PRN):  acetaminophen     Tablet .. 650 milliGRAM(s) Oral every 6 hours PRN Mild Pain (1 - 3), Moderate Pain (4 - 6)         Vitals log        ICU Vital Signs Last 24 Hrs  T(C): 36.9 (19 Dec 2021 04:56), Max: 37.4 (18 Dec 2021 22:39)  T(F): 98.4 (19 Dec 2021 04:56), Max: 99.3 (18 Dec 2021 22:39)  HR: 94 (19 Dec 2021 04:56) (66 - 101)  BP: 111/72 (19 Dec 2021 04:56) (111/72 - 157/80)  BP(mean): --  ABP: --  ABP(mean): --  RR: 18 (19 Dec 2021 04:56) (18 - 18)  SpO2: 94% (19 Dec 2021 04:56) (94% - 100%)           Input and Output:  I&O's Detail      Lab Data                        11.1   7.49  )-----------( 334      ( 18 Dec 2021 04:53 )             35.1     12-18    139  |  108  |  25<H>  ----------------------------<  153<H>  4.6   |  21<L>  |  1.50<H>    Ca    9.2      18 Dec 2021 13:41    TPro  7.1  /  Alb  2.5<L>  /  TBili  0.5  /  DBili  x   /  AST  16  /  ALT  11<L>  /  AlkPhos  90  12-18            Review of Systems	      Objective     Physical Examination    heart s1s2  lung dec BS  abd soft  head nc  on NIPPV overnight      Pertinent Lab findings & Imaging      Eliecer:  NO   Adequate UO     I&O's Detail           Discussed with:     Cultures:	        Radiology

## 2021-12-20 LAB
ALBUMIN SERPL ELPH-MCNC: 2.6 G/DL — LOW (ref 3.3–5)
ALP SERPL-CCNC: 91 U/L — SIGNIFICANT CHANGE UP (ref 40–120)
ALT FLD-CCNC: 11 U/L — LOW (ref 12–78)
ANION GAP SERPL CALC-SCNC: 4 MMOL/L — LOW (ref 5–17)
AST SERPL-CCNC: 11 U/L — LOW (ref 15–37)
BILIRUB SERPL-MCNC: 0.3 MG/DL — SIGNIFICANT CHANGE UP (ref 0.2–1.2)
BUN SERPL-MCNC: 23 MG/DL — SIGNIFICANT CHANGE UP (ref 7–23)
CALCIUM SERPL-MCNC: 9.9 MG/DL — SIGNIFICANT CHANGE UP (ref 8.5–10.1)
CHLORIDE SERPL-SCNC: 107 MMOL/L — SIGNIFICANT CHANGE UP (ref 96–108)
CO2 SERPL-SCNC: 32 MMOL/L — HIGH (ref 22–31)
CREAT SERPL-MCNC: 1.4 MG/DL — HIGH (ref 0.5–1.3)
GLUCOSE SERPL-MCNC: 161 MG/DL — HIGH (ref 70–99)
HCT VFR BLD CALC: 37.8 % — LOW (ref 39–50)
HGB BLD-MCNC: 11.7 G/DL — LOW (ref 13–17)
MCHC RBC-ENTMCNC: 27.2 PG — SIGNIFICANT CHANGE UP (ref 27–34)
MCHC RBC-ENTMCNC: 31 GM/DL — LOW (ref 32–36)
MCV RBC AUTO: 87.9 FL — SIGNIFICANT CHANGE UP (ref 80–100)
NRBC # BLD: 0 /100 WBCS — SIGNIFICANT CHANGE UP (ref 0–0)
PLATELET # BLD AUTO: 335 K/UL — SIGNIFICANT CHANGE UP (ref 150–400)
POTASSIUM SERPL-MCNC: 4 MMOL/L — SIGNIFICANT CHANGE UP (ref 3.5–5.3)
POTASSIUM SERPL-SCNC: 4 MMOL/L — SIGNIFICANT CHANGE UP (ref 3.5–5.3)
PROT SERPL-MCNC: 7 G/DL — SIGNIFICANT CHANGE UP (ref 6–8.3)
RBC # BLD: 4.3 M/UL — SIGNIFICANT CHANGE UP (ref 4.2–5.8)
RBC # FLD: 13 % — SIGNIFICANT CHANGE UP (ref 10.3–14.5)
SODIUM SERPL-SCNC: 143 MMOL/L — SIGNIFICANT CHANGE UP (ref 135–145)
VANCOMYCIN TROUGH SERPL-MCNC: 11.7 UG/ML — SIGNIFICANT CHANGE UP (ref 10–20)
WBC # BLD: 6.53 K/UL — SIGNIFICANT CHANGE UP (ref 3.8–10.5)
WBC # FLD AUTO: 6.53 K/UL — SIGNIFICANT CHANGE UP (ref 3.8–10.5)

## 2021-12-20 PROCEDURE — 93306 TTE W/DOPPLER COMPLETE: CPT | Mod: 26

## 2021-12-20 PROCEDURE — 73720 MRI LWR EXTREMITY W/O&W/DYE: CPT | Mod: 26,LT

## 2021-12-20 PROCEDURE — 93010 ELECTROCARDIOGRAM REPORT: CPT

## 2021-12-20 PROCEDURE — 99233 SBSQ HOSP IP/OBS HIGH 50: CPT | Mod: GC

## 2021-12-20 PROCEDURE — 99232 SBSQ HOSP IP/OBS MODERATE 35: CPT

## 2021-12-20 RX ORDER — ASPIRIN/CALCIUM CARB/MAGNESIUM 324 MG
81 TABLET ORAL DAILY
Refills: 0 | Status: DISCONTINUED | OUTPATIENT
Start: 2021-12-20 | End: 2021-12-30

## 2021-12-20 RX ADMIN — AZITHROMYCIN 250 MILLIGRAM(S): 500 TABLET, FILM COATED ORAL at 06:21

## 2021-12-20 RX ADMIN — Medication 50 MILLIGRAM(S): at 17:27

## 2021-12-20 RX ADMIN — Medication 300 MILLIGRAM(S): at 06:20

## 2021-12-20 RX ADMIN — CEFEPIME 100 MILLIGRAM(S): 1 INJECTION, POWDER, FOR SOLUTION INTRAMUSCULAR; INTRAVENOUS at 06:18

## 2021-12-20 RX ADMIN — TAMSULOSIN HYDROCHLORIDE 0.4 MILLIGRAM(S): 0.4 CAPSULE ORAL at 21:55

## 2021-12-20 RX ADMIN — Medication 50 MILLIGRAM(S): at 06:20

## 2021-12-20 RX ADMIN — Medication 4 MILLIGRAM(S): at 06:20

## 2021-12-20 RX ADMIN — Medication 81 MILLIGRAM(S): at 17:27

## 2021-12-20 RX ADMIN — ATORVASTATIN CALCIUM 80 MILLIGRAM(S): 80 TABLET, FILM COATED ORAL at 21:55

## 2021-12-20 RX ADMIN — BUDESONIDE AND FORMOTEROL FUMARATE DIHYDRATE 2 PUFF(S): 160; 4.5 AEROSOL RESPIRATORY (INHALATION) at 06:21

## 2021-12-20 RX ADMIN — CEFEPIME 100 MILLIGRAM(S): 1 INJECTION, POWDER, FOR SOLUTION INTRAMUSCULAR; INTRAVENOUS at 17:26

## 2021-12-20 RX ADMIN — BUDESONIDE AND FORMOTEROL FUMARATE DIHYDRATE 2 PUFF(S): 160; 4.5 AEROSOL RESPIRATORY (INHALATION) at 17:35

## 2021-12-20 RX ADMIN — TIOTROPIUM BROMIDE 1 CAPSULE(S): 18 CAPSULE ORAL; RESPIRATORY (INHALATION) at 06:21

## 2021-12-20 NOTE — PROGRESS NOTE ADULT - PROBLEM SELECTOR PLAN 9
- PVD s/p stents in b/l legs -- right leg in 2020  - Will discontinue Plavix and start ASA 81 mg daily in setting of planned procedure

## 2021-12-20 NOTE — PROGRESS NOTE ADULT - SUBJECTIVE AND OBJECTIVE BOX
Patient is a 81y old  Male who presents with a chief complaint of osteo (20 Dec 2021 10:41)      INTERVAL HPI/OVERNIGHT EVENTS: No overnight events. Patient seen and examined at bedside. Patient has no complaints at this time, resting comfortably in bed, feeling well. Denies fevers, chills, headache, lightheadedness, chest pain, dyspnea, abdominal pain, nausea, vomiting, diarrhea, constipation.      MEDICATIONS  (STANDING):  aspirin enteric coated 81 milliGRAM(s) Oral daily  atorvastatin 80 milliGRAM(s) Oral at bedtime  azithromycin   Tablet 250 milliGRAM(s) Oral <User Schedule>  budesonide 160 MICROgram(s)/formoterol 4.5 MICROgram(s) Inhaler 2 Puff(s) Inhalation two times a day  cefepime   IVPB 2000 milliGRAM(s) IV Intermittent every 12 hours  dextrose 40% Gel 15 Gram(s) Oral once  dextrose 5%. 1000 milliLiter(s) (50 mL/Hr) IV Continuous <Continuous>  dextrose 5%. 1000 milliLiter(s) (100 mL/Hr) IV Continuous <Continuous>  dextrose 50% Injectable 25 Gram(s) IV Push once  dextrose 50% Injectable 12.5 Gram(s) IV Push once  dextrose 50% Injectable 25 Gram(s) IV Push once  glucagon  Injectable 1 milliGRAM(s) IntraMuscular once  insulin lispro (ADMELOG) corrective regimen sliding scale   SubCutaneous three times a day before meals  insulin lispro (ADMELOG) corrective regimen sliding scale   SubCutaneous at bedtime  metoprolol tartrate 50 milliGRAM(s) Oral two times a day  predniSONE   Tablet 4 milliGRAM(s) Oral daily  sodium chloride 0.9%. 1000 milliLiter(s) (60 mL/Hr) IV Continuous <Continuous>  tamsulosin 0.4 milliGRAM(s) Oral at bedtime  tiotropium 18 MICROgram(s) Capsule 1 Capsule(s) Inhalation daily  vancomycin  IVPB 1500 milliGRAM(s) IV Intermittent every 24 hours    MEDICATIONS  (PRN):  acetaminophen     Tablet .. 650 milliGRAM(s) Oral every 6 hours PRN Mild Pain (1 - 3), Moderate Pain (4 - 6)  melatonin 5 milliGRAM(s) Oral at bedtime PRN Insomnia      Allergies    No Known Allergies    Intolerances    shellfish (Nausea)      REVIEW OF SYSTEMS:  CONSTITUTIONAL: No fever or chills  HEENT:  No headache, no sore throat  RESPIRATORY: No cough, wheezing, or shortness of breath  CARDIOVASCULAR: No chest pain, palpitations  GASTROINTESTINAL: No abd pain, nausea, vomiting, or diarrhea  GENITOURINARY: No dysuria, frequency, or hematuria  NEUROLOGICAL: no focal weakness or dizziness  MUSCULOSKELETAL: no myalgias     Vital Signs Last 24 Hrs  T(C): 37.1 (19 Dec 2021 21:15), Max: 37.1 (19 Dec 2021 21:15)  T(F): 98.7 (19 Dec 2021 21:15), Max: 98.7 (19 Dec 2021 21:15)  HR: 83 (20 Dec 2021 06:34) (71 - 92)  BP: 123/70 (20 Dec 2021 06:34) (105/67 - 124/71)  BP(mean): --  RR: 18 (19 Dec 2021 21:15) (18 - 18)  SpO2: 98% (20 Dec 2021 06:34) (95% - 98%)    PHYSICAL EXAM:  GENERAL: not in acute distress at rest   HEENT:  anicteric, moist mucous membranes  CHEST/LUNG:  CTA b/l, no rales, wheezes, or rhonchi  HEART:  RRR, S1, S2  ABDOMEN:  BS+, soft, nontender, nondistended  EXTREMITIES: no edema, cyanosis, or calf tenderness; Vertical scar on L lateral thigh, no TTP  NERVOUS SYSTEM: answers questions and follows commands appropriately    LABS:                        11.7   6.53  )-----------( 335      ( 20 Dec 2021 05:37 )             37.8     CBC Full  -  ( 20 Dec 2021 05:37 )  WBC Count : 6.53 K/uL  Hemoglobin : 11.7 g/dL  Hematocrit : 37.8 %  Platelet Count - Automated : 335 K/uL  Mean Cell Volume : 87.9 fl  Mean Cell Hemoglobin : 27.2 pg  Mean Cell Hemoglobin Concentration : 31.0 gm/dL  Auto Neutrophil # : x  Auto Lymphocyte # : x  Auto Monocyte # : x  Auto Eosinophil # : x  Auto Basophil # : x  Auto Neutrophil % : x  Auto Lymphocyte % : x  Auto Monocyte % : x  Auto Eosinophil % : x  Auto Basophil % : x    20 Dec 2021 05:37    143    |  107    |  23     ----------------------------<  161    4.0     |  32     |  1.40     Ca    9.9        20 Dec 2021 05:37    TPro  7.0    /  Alb  2.6    /  TBili  0.3    /  DBili  x      /  AST  11     /  ALT  11     /  AlkPhos  91     20 Dec 2021 05:37        CAPILLARY BLOOD GLUCOSE      POCT Blood Glucose.: 192 mg/dL (20 Dec 2021 11:30)  POCT Blood Glucose.: 232 mg/dL (20 Dec 2021 09:46)  POCT Blood Glucose.: 309 mg/dL (19 Dec 2021 22:06)  POCT Blood Glucose.: 140 mg/dL (19 Dec 2021 17:06)        Culture - Blood (collected 12-17-21 @ 21:22)  Source: .Blood Blood  Preliminary Report (12-18-21 @ 22:01):    No growth to date.    Culture - Blood (collected 12-17-21 @ 21:22)  Source: .Blood Blood  Preliminary Report (12-18-21 @ 22:01):    No growth to date.        RADIOLOGY & ADDITIONAL TESTS:  Personally reviewed.   ACC: [id number] EXAM:  MR FEMUR WAW IC LT                          PROCEDURE DATE:  12/20/2021          INTERPRETATION:  History: Chronic osteomyelitis    Technique: Multiplanar and multi-sequence MRI images were obtained of the   left femur without and with contrast. Approximately 10 cc intravenous   Gadavist was administered.    Comparison: Comparison CT dated 12/17/2021.    Findings:    Please note that the distal femur is off the field of view.    There is a multiloculated rim-enhancing abscess along the lateral and   anterior aspect of the mid to distal femur, which measures approximately   25 cm proximal to distal, with thick rim of peripheral enhancement. The   transaxial dimension is difficult to measure, but measures up to 7.2 x   4.7 cm transaxially.    There is a chronic appearing posttraumatic deformity of the mid to distal   femur with areas of osseous edema and T1 marrow signal abnormality   subjacent to the abscess, likely related to osteomyelitis. There is a   possible multiloculated abscess within the mid to proximal femoral   diaphysis near the superior portion of the abscess with findings   concerning for a sequestrum an involucrum when correlated with recent CT.   There is likely a cloaca leading from this probableabscess to the   anterolateral soft tissues. This area of possible intraosseous abscess is   difficult to measure but measures approximately 1.7 x 2.6 cm transaxially.    Subcutaneous edema with enhancement along the lateral and anterolateral   mid to distal thigh, likely representing cellulitis.    Neurovascular structures are unremarkable.    Impression:    Multiloculated rim-enhancing abscess along the anterior and lateral   aspect of the mid to distal femur. Given the thick rim of enhancement,   recommend follow-up MRI with and without contrast after therapy to rule   out an underlying mass/neoplasm.    Posttraumatic deformity of the mid to distal femur with findings most   compatible with chronic osteomyelitis of the femur subjacent to the   abscess.    Probable intraosseous abscess within the mid to proximal diaphysis of the   femur near the superior aspect of the abscess with probable areas of   sequestrum, involucrum, and cloaca formation when correlated with recent   CT.    --- End of Report ---            REY PATTEN M.D., ATTENDING RADIOLOGIST  This document has been electronically signed. Dec 20 2021  1:06PM      Consultant(s) Notes Reviewed:  [x] YES  [ ] NO

## 2021-12-20 NOTE — PROGRESS NOTE ADULT - SUBJECTIVE AND OBJECTIVE BOX
[facility] [place] [facility] -- Saud Aguilar, Génesis Louise Pannella, Patel, Savella  Office # [phone]      Follow Up:    CAD HTN HLD PAT  Subjective/Observations:     No events overnight resting comfortably in bed.  No complaints of chest pain, dyspnea, or palpitations reported. No signs of orthopnea or PND.    REVIEW OF SYSTEMS: All other review of systems is negative unless indicated above    PAST MEDICAL & SURGICAL HISTORY:  Diabetes Mellitus, Type II    CAD (Coronary Artery Disease)  s/p 3v CABG 2004; stents placed in [place] in 2019    Dyslipidemia    Osteomyelitis    COPD (chronic obstructive pulmonary disease)  on 2L at home and BiPAP at night; intubated 6/18    Hypertension    PVD (peripheral vascular disease)    History of PAT (paroxysmal atrial tachycardia)    CABG (Coronary Artery Bypass Graft)  2004    Compound fracture  left leg    S/P primary angioplasty with coronary stent        MEDICATIONS  (STANDING):  atorvastatin 80 milliGRAM(s) Oral at bedtime  azithromycin   Tablet 250 milliGRAM(s) Oral <User Schedule>  budesonide 160 MICROgram(s)/formoterol 4.5 MICROgram(s) Inhaler 2 Puff(s) Inhalation two times a day  cefepime   IVPB 2000 milliGRAM(s) IV Intermittent every 12 hours  clopidogrel Tablet 75 milliGRAM(s) Oral daily  dextrose 40% Gel 15 Gram(s) Oral once  dextrose 5%. 1000 milliLiter(s) (50 mL/Hr) IV Continuous <Continuous>  dextrose 5%. 1000 milliLiter(s) (100 mL/Hr) IV Continuous <Continuous>  dextrose 50% Injectable 25 Gram(s) IV Push once  dextrose 50% Injectable 12.5 Gram(s) IV Push once  dextrose 50% Injectable 25 Gram(s) IV Push once  glucagon  Injectable 1 milliGRAM(s) IntraMuscular once  insulin lispro (ADMELOG) corrective regimen sliding scale   SubCutaneous three times a day before meals  insulin lispro (ADMELOG) corrective regimen sliding scale   SubCutaneous at bedtime  metoprolol tartrate 50 milliGRAM(s) Oral two times a day  predniSONE   Tablet 4 milliGRAM(s) Oral daily  sodium chloride 0.9%. 1000 milliLiter(s) (60 mL/Hr) IV Continuous <Continuous>  tamsulosin 0.4 milliGRAM(s) Oral at bedtime  tiotropium 18 MICROgram(s) Capsule 1 Capsule(s) Inhalation daily  vancomycin  IVPB 1500 milliGRAM(s) IV Intermittent every 24 hours    MEDICATIONS  (PRN):  acetaminophen     Tablet .. 650 milliGRAM(s) Oral every 6 hours PRN Mild Pain (1 - 3), Moderate Pain (4 - 6)  melatonin 5 milliGRAM(s) Oral at bedtime PRN Insomnia      Allergies    No Known Allergies    Intolerances    shellfish (Nausea)      Vital Signs Last 24 Hrs  T(C): 37.1 (19 Dec 2021 21:15), Max: 37.1 (19 Dec 2021 21:15)  T(F): 98.7 (19 Dec 2021 21:15), Max: 98.7 (19 Dec 2021 21:15)  HR: 83 (20 Dec 2021 06:34) (71 - 92)  BP: 123/70 (20 Dec 2021 06:34) (105/67 - 124/71)  BP(mean): --  RR: 18 (19 Dec 2021 21:15) (18 - 18)  SpO2: 98% (20 Dec 2021 06:34) (95% - 98%)    I&O's Summary    19 Dec 2021 07:01  -  20 Dec 2021 07:00  --------------------------------------------------------  IN: 0 mL / OUT: 250 mL / NET: -250 mL          PHYSICAL EXAM:  TELE:   Constitutional: NAD, awake and alert, well-developed  HEENT: Moist Mucous Membranes, Anicteric  Pulmonary: Non-labored, breath sounds are clear bilaterally, No wheezing, crackles or rhonchi  Cardiovascular: Regular, S1 and S2 nl, No murmurs, rubs, gallops or clicks  Gastrointestinal: Bowel Sounds present, soft, nontender.   Lymph: No lymphadenopathy. No peripheral edema.  Skin: No visible rashes or ulcers.  Psych:  Mood & affect appropriate    LABS: All Labs Reviewed:                        11.7   6.53  )-----------( 335      ( 20 Dec 2021 05:37 )             37.8                         11.6   7.67  )-----------( 334      ( 19 Dec 2021 09:06 )             37.5                         11.1   7.49  )-----------( 334      ( 18 Dec 2021 04:53 )             35.1     20 Dec 2021 05:37    143    |  107    |  23     ----------------------------<  161    4.0     |  32     |  1.40   19 Dec 2021 09:06    142    |  105    |  23     ----------------------------<  157    3.9     |  30     |  1.40   18 Dec 2021 13:41    139    |  108    |  25     ----------------------------<  153    4.6     |  21     |  1.50     Ca    9.9        20 Dec 2021 05:37  Ca    9.3        19 Dec 2021 09:06  Ca    9.2        18 Dec 2021 13:41    TPro  7.0    /  Alb  2.6    /  TBili  0.3    /  DBili  x      /  AST  11     /  ALT  11     /  AlkPhos  91     20 Dec 2021 05:37  TPro  6.9    /  Alb  2.5    /  TBili  0.4    /  DBili  x      /  AST  11     /  ALT  9      /  AlkPhos  85     19 Dec 2021 09:06  TPro  7.1    /  Alb  2.5    /  TBili  0.5    /  DBili  x      /  AST  16     /  ALT  11     /  AlkPhos  90     18 Dec 2021 13:41      : Xray Chest 1 View AP/PA (12.17.21 @ 17:09) >  ACC: [id number] EXAM:  XR CHEST AP OR PA 1V                          PROCEDURE DATE:  12/17/2021          INTERPRETATION:  Evaluate for pneumonia    AP chest. Prior 9/3/2021.    Median sternotomy. Normal heart mediastinum. Hyperinflated lungs. No   consolidation or effusion.    IMPRESSION: Hyperinflated lungs. No active infiltrates    --- End of Report ---            RODRIGO FAITH MD; Attending Radiologist  This document has been electronically signed. Dec 18 2021  9:39AM    < end of copied text >  < from: US Duplex Venous Lower Ext Ltd, Left (12.17.21 @ 19:56) >  ACC: [id number] EXAM:  US DPLX LWR EXT VEINS LTD LT                          PROCEDURE DATE:  12/17/2021          INTERPRETATION:  CLINICAL INFORMATION: Left leg pain. Evaluate for DVT.    COMPARISON: 1/17/2020.    TECHNIQUE: Duplex sonography of theLEFT LOWER extremity veins with color   and spectral Doppler, with and without compression.    FINDINGS:    There is normal compressibility of the left common femoral, femoral and   popliteal veins.  The contralateral common femoral vein is patent.  Doppler examination shows normal spontaneous and phasic flow.    No calf vein thrombosis is detected.    IMPRESSION:  No evidence of left lower extremity deep venous thrombosis.          --- End of Report ---            ELEAZAR CASTAÑEDA MD; Attending Radiologist  This document has been electronically signed. Dec 17 2021  8:17PM    < end of copied text >  < from: CT Femur No Cont, Left (12.17.21 @ 22:24) >  ACC: [id number] EXAM:  CT FEMUR ONLY LT                          PROCEDURE DATE:  12/17/2021          INTERPRETATION:  CLINICAL INFORMATION: Left leg pain. History of fracture   and osteomyelitis.    TECHNIQUE: CT of the left femur was performed in bone and soft tissue   windows with coronal and sagittal reformats. No intravenous contrast was   administered.    COMPARISON: CT of bilateral femurs from 11/6/2010.    FINDINGS:    Bone: An old healed deformity of the left mid femur is not significantly   changed. An old left femoral intramedullary dominick tract is noted. No acute   fracture or dislocation is demonstrated. Heterotopic ossification   superior to the left greater trochanter is not significantly changed. A   new lobulated 2.2 x 1.8 cm intramedullary defect with tract to the   lateral cortical surface is seen in the mid femur. Degenerative changes   of the left knee is noted.    Soft tissues: Complex heterogeneous lobulated soft tissue measuring 6.9 x   6.9 x 18.3 cm is seen anterior and lateral to the femur involving the   middle two thirds of the femur with mild adjacent inflammatory change.   The vastus intermedialis and lateralis musculature is markedly atrophic.   Vascular calcifications are noted. Bilateral external iliac artery and   left mid to distal femoral artery stents are seen.    IMPRESSION:    Old healed deformity of the mid left femur likely correlating with the   history of a prior osteomyelitis. Complex heterogeneous lobulated soft   tissue anterior and lateral to the middle two thirds of the left femur   concerning for an abscess. New lobulated intramedullary defect with tract   extending to the cortical surface in the mid left femur concerning for a   possible intramedullary abscess. A contrast-enhancedMRI of the left   femur is recommended for further evaluation.    --- End of Report ---            PAZ CHUA MD; Attending Radiologist  This document has been electronically signed. Dec 17 2021 11:20PM    < end of copied text >    EKG:  < from: 12 Lead ECG (12.17.21 @ 16:27) >  Ventricular Rate 97 BPM    Atrial Rate 97 BPM    P-R Interval 114 ms    QRS Duration 80 ms    Q-T Interval 344 ms    QTC Calculation(Bazett) 436 ms    P Axis 59 degrees    R Axis 79 degrees    T Axis 57 degrees    Diagnosis Line Sinus rhythm with premature supraventricular complexes  Otherwise normal ECG  When compared with ECG of 03-SEP-2021 20:01,  premature supraventricular complexes are now present  Confirmed by Regla Figueroa ([id number]) on 12/18/2021 2:58:38 PM    < end of copied text >  < from: TTE with Doppler (w/Cont) (08.03.21 @ 06:53) >    Patient name: YUE COTTO  YOB: 1940   Age: 81 (M)   MR#: [id number]  Study Date: 8/3/2021  [facility]  Study quality: Technically difficult  Referring Physician: Cecelia Mendenhall MD  BloodPressure: 156/93 mmHg  Height: 180 cm  Weight: 113 kg  BSA: 2.3 m2  Heart Rate: 99 mmHg  ------------------------------------------------------------------------  PROCEDURE: Transthoracic echocardiogram with 2-D, M-Mode  and complete spectral and color flow Doppler. Verbal  consent was obtained for injection of  Ultrasonic Enhancing  Agent following a discussion of risks and benefits.  Following intravenous injection of Ultrasonic Enhancing  Agent , harmonic imaging was performed.  INDICATION: Abnormal electrocardiogram (ECG) (EKG) (R94.31)  ------------------------------------------------------------------------  Dimensions:    Normal Values:  LA:     3.4    2.0 - 4.0 cm  Ao:     3.6    2.0 - 3.8 cm  SEPTUM: 0.7    0.6 - 1.2 cm  PWT:    0.6  0.6 - 1.1 cm  LVIDd:  3.9    3.0 - 5.6 cm  LVIDs:  3.3    1.8 - 4.0 cm  Derived variables:  LVMI: 34 g/m2  RWT: 0.35  Fractional short: 14 %  EF (Visual Estimate): 45-50 %  Doppler Peak Velocity (m/sec): AoV=0.9  ------------------------------------------------------------------------  Observations:  Mitral Valve: Mitral valve not well visualized, probably  normal. Minimal mitral regurgitation.  Aortic Valve/Aorta: Calcified trileaflet aortic valve with  normal opening. Peak transaortic valve gradient equals 4 mm  Hg, estimated aortic valve area equals 3.1 sqcm. No aortic  valve regurgitation seen. Peak left ventricular outflow  tract gradient equals 3 mm Hg.  Aortic Root: 3.6 cm.  Left Atrium: Normal left atrium.  Left Ventricle: Mild segmental left ventricular systolic  dysfunction. The ejection fraction varies with the R-R  interval.  The apical inferior wall, the apical septum, the  apical lateral wall, the basal inferior wall, the mid  anterior wall, and the mid inferior wall are hypokinetic.  Septal motion consistent with cardiac surgery. Normal left  ventricular internal dimensions and wall thicknesses. Mild  diastolic dysfunction (Stage I).  Right Heart: Normal right atrium. Normal right ventricular  size and function. Normal tricuspid valve. Minimal  tricuspid regurgitation. Normal pulmonic valve. No pulmonic  regurgitation.  Pericardium/Pleura: Normal pericardium with no pericardial  effusion.  Hemodynamic: Color Doppler demonstrates no evidence of a  patent foramen ovale.  ------------------------------------------------------------------------  Conclusions:  1. Mitral valve not well visualized, probably normal.  Minimal mitral regurgitation.  2. Calcified trileaflet aortic valve with normal opening.  No aortic valve regurgitation seen.  3. Mild segmental left ventricular systolic dysfunction.  The ejection fraction varies with the R-R interval.  The  apical inferior wall, the apical septum, the apical lateral  wall, the basal inferior wall, the mid anterior wall, and  the mid inferior wall are hypokinetic.  Septal motion  consistent with cardiac surgery.  4. Mild diastolic dysfunction (Stage I).  5. Normal right ventricular size and function.

## 2021-12-20 NOTE — PROGRESS NOTE ADULT - PROBLEM SELECTOR PLAN 1
Intramedullary Abscess   - pt with h/o compound fracture/osteomyelitis of left femur. now p/w left leg pain.   - CT revealed Old healed deformity of the mid left femur , Complex heterogeneous lobulated soft  tissue anterior and lateral to the middle two thirds of the left femur concerning for an abscess. New lobulated intramedullary defect with tract extending to the cortical surface in the mid left femur concerning for a possible intramedullary abscess.   - MRI w/w/o IV con: Multiloculated rim-enhancing abscess along the anterior and lateral aspect of the mid to distal femur. Given the thick rim of enhancement, recommend follow-up MRI with and without contrast after therapy to rule out an underlying mass/neoplasm. Probable intraosseous abscess within the mid to proximal diaphysis of the femur near the superior aspect of the abscess with probable areas of sequestrum, involucrum, and cloaca formation when correlated with recent CT. Findings also concerning for adjacent chronic osteo.   - Afebrile, no leukocytosis. monitor for fever. trend daily cbc with diff   - tylenol for pain  - BCx x2 NGTD  - Doppler negative for DVT  - Continue cefepime, vanc   - ID ([place]), Ortho consulted, recs appreciated   - Nephro consulted for recommendations on using contrast with patient in [place]  - D/w IR - plan for drain placement. Eliquis on hold since 12/17. Will discontinue Plavix and start patient ASA 81 daily for now pending upcoming procedure.   - Patient can be placed on chronic suppressive therapy with IV Abx s/p drain placement

## 2021-12-20 NOTE — PROGRESS NOTE ADULT - ASSESSMENT
CKD 3 Baseline creatinine 1.2-1.5  Diabetes  Hypertension  Left femur abscess    Renal indices at baseline. To continue current meds. Monitor blood sugar levels. Insulin coverage as needed. Dietary restriction.   Monitor BP trend. Titrate BP meds as needed. Salt restriction. IV abx. ID follow up.

## 2021-12-20 NOTE — PROGRESS NOTE ADULT - SUBJECTIVE AND OBJECTIVE BOX
Patient is a 81y old  Male who presents with a chief complaint of osteo (20 Dec 2021 10:41)    Patient seen in follow up for MERRILL.        PAST MEDICAL HISTORY:  Diabetes Mellitus, Type II    CAD (Coronary Artery Disease)    Dyslipidemia    Asymptomatic Peripheral Vascular Disease    Osteomyelitis    COPD (chronic obstructive pulmonary disease)    Diabetes mellitus    Hypertension    PVD (peripheral vascular disease)    History of PAT (paroxysmal atrial tachycardia)      MEDICATIONS  (STANDING):  atorvastatin 80 milliGRAM(s) Oral at bedtime  azithromycin   Tablet 250 milliGRAM(s) Oral <User Schedule>  budesonide 160 MICROgram(s)/formoterol 4.5 MICROgram(s) Inhaler 2 Puff(s) Inhalation two times a day  cefepime   IVPB 2000 milliGRAM(s) IV Intermittent every 12 hours  clopidogrel Tablet 75 milliGRAM(s) Oral daily  dextrose 40% Gel 15 Gram(s) Oral once  dextrose 5%. 1000 milliLiter(s) (50 mL/Hr) IV Continuous <Continuous>  dextrose 5%. 1000 milliLiter(s) (100 mL/Hr) IV Continuous <Continuous>  dextrose 50% Injectable 25 Gram(s) IV Push once  dextrose 50% Injectable 12.5 Gram(s) IV Push once  dextrose 50% Injectable 25 Gram(s) IV Push once  glucagon  Injectable 1 milliGRAM(s) IntraMuscular once  insulin lispro (ADMELOG) corrective regimen sliding scale   SubCutaneous three times a day before meals  insulin lispro (ADMELOG) corrective regimen sliding scale   SubCutaneous at bedtime  metoprolol tartrate 50 milliGRAM(s) Oral two times a day  predniSONE   Tablet 4 milliGRAM(s) Oral daily  sodium chloride 0.9%. 1000 milliLiter(s) (60 mL/Hr) IV Continuous <Continuous>  tamsulosin 0.4 milliGRAM(s) Oral at bedtime  tiotropium 18 MICROgram(s) Capsule 1 Capsule(s) Inhalation daily  vancomycin  IVPB 1500 milliGRAM(s) IV Intermittent every 24 hours    MEDICATIONS  (PRN):  acetaminophen     Tablet .. 650 milliGRAM(s) Oral every 6 hours PRN Mild Pain (1 - 3), Moderate Pain (4 - 6)  melatonin 5 milliGRAM(s) Oral at bedtime PRN Insomnia    T(C): 37.1 (12-19-21 @ 21:15), Max: 37.1 (12-19-21 @ 21:15)  HR: 83 (12-20-21 @ 06:34) (71 - 101)  BP: 123/70 (12-20-21 @ 06:34) (105/67 - 124/71)  RR: 18 (12-19-21 @ 21:15) (18 - 18)  SpO2: 98% (12-20-21 @ 06:34) (94% - 98%)  Wt(kg): --  I&O's Detail    19 Dec 2021 07:01  -  20 Dec 2021 07:00  --------------------------------------------------------  IN:  Total IN: 0 mL    OUT:    Voided (mL): 250 mL  Total OUT: 250 mL    Total NET: -250 mL          PHYSICAL EXAM:  General: No distress  Respiratory: b/l air entry  Cardiovascular: S1 S2  Gastrointestinal: soft  Extremities:  no edema                              11.7   6.53  )-----------( 335      ( 20 Dec 2021 05:37 )             37.8     12-20    143  |  107  |  23  ----------------------------<  161<H>  4.0   |  32<H>  |  1.40<H>    Ca    9.9      20 Dec 2021 05:37    TPro  7.0  /  Alb  2.6<L>  /  TBili  0.3  /  DBili  x   /  AST  11<L>  /  ALT  11<L>  /  AlkPhos  91  12-20        LIVER FUNCTIONS - ( 20 Dec 2021 05:37 )  Alb: 2.6 g/dL / Pro: 7.0 g/dL / ALK PHOS: 91 U/L / ALT: 11 U/L / AST: 11 U/L / GGT: x               Sodium, Serum: 143 (12-20 @ 05:37)  Sodium, Serum: 142 (12-19 @ 09:06)  Sodium, Serum: 139 (12-18 @ 13:41)  Sodium, Serum: 138 (12-17 @ 17:43)    Creatinine, Serum: 1.40 (12-20 @ 05:37)  Creatinine, Serum: 1.40 (12-19 @ 09:06)  Creatinine, Serum: 1.50 (12-18 @ 13:41)  Creatinine, Serum: 1.60 (12-17 @ 17:43)    Potassium, Serum: 4.0 (12-20 @ 05:37)  Potassium, Serum: 3.9 (12-19 @ 09:06)  Potassium, Serum: 4.6 (12-18 @ 13:41)  Potassium, Serum: 5.0 (12-17 @ 17:43)    Hemoglobin: 11.7 (12-20 @ 05:37)  Hemoglobin: 11.6 (12-19 @ 09:06)  Hemoglobin: 11.1 (12-18 @ 04:53)  Hemoglobin: 12.8 (12-17 @ 17:43)

## 2021-12-20 NOTE — PROGRESS NOTE ADULT - ASSESSMENT
82yo male with pmhx DM2, CAD s/p 3v CABG 2004, stents in 2019, HLD, HTN, PAT(on Eliquis), PVD(s/p stents in b/l legs -- right leg in 2020), osteomyelitis, COPD on prn home o2 and nocturnal bipap, type 2 Dm who presented to ED with increasing left leg pain x 1 week.  Now found to have w/ osteomyelitis, concern for intramedullary abscess    CAD/HTN/HLD/PAT  check 12 lead EKG  there is no evidence for meaningful  volume overload.  TTE 8/3/21 w/ hypokinesis mild DD no need to repeat  continue Plavix, Lipitor, Lopressor  Losartan held 2/2 MERRILL  Eliquis held for possible OR intervention fu ortho recs, MRI    further plan and recommendations pending clinical course and results of above   Monitor and replete electrolytes. Keep K>4.0 and Mg>2.0.  Aziza Portillo FNP-C  Cardiology NP  [id number] [phone]

## 2021-12-20 NOTE — PROGRESS NOTE ADULT - PROBLEM SELECTOR PLAN 8
- CAD s/p 3v CABG 2004, stents in 2019  - continue beta blocker   - D/w IR and cardio - Will discontinue Plavix and start ASA 81 mg daily in setting of planned procedure  - On Eliquis for ? PAT, last dose given on 12/17, will resume following procedure pending okay from IR

## 2021-12-20 NOTE — PROGRESS NOTE ADULT - SUBJECTIVE AND OBJECTIVE BOX
Patient seen and examined at bedside. Nasal cannula in place. Pain with ambulation of LLE. Denies pain when at rest, but endorses pain with palpation. No visible draining sinus tract. Denies subjective f/c.     Vital Signs Last 24 Hrs  T(C): 37.1 (19 Dec 2021 21:15), Max: 37.1 (19 Dec 2021 21:15)  T(F): 98.7 (19 Dec 2021 21:15), Max: 98.7 (19 Dec 2021 21:15)  HR: 83 (20 Dec 2021 06:34) (71 - 92)  BP: 123/70 (20 Dec 2021 06:34) (105/67 - 124/71)  BP(mean): --  RR: 18 (19 Dec 2021 21:15) (18 - 18)  SpO2: 98% (20 Dec 2021 06:34) (95% - 98%)    Gen: NAD, Resting comfortably, aaox3  LLE  antereolateral surgical scar, well healed. Defect in muscle noted, no drainage. Palpable collection superior to surgical scar. No defect in skin, no clear sinus tract visualized  No bony tenderness to palpation  +EHL/FHL/TA/GSC  +SILT L3-S1  + DP  Compartments soft and compressible  No calf tenderness    A/P: 81M/F w/ osteomyelitis, concern for intramedullary abscess    Patient's vitals are stable, no indication of sepsis  To further assess fluid collection/abscess, recommend  MR w and wo of left femur. Will speak with IR to discusse drainage and pigtail placement.   Will monitor clinical course and continuation of care with Dr. Varela   Analgesia  NWB LLE  DVT ppx with scds for now, possible surgical intervention/IR drainage   Discussed with attending who is in agreement with above plan

## 2021-12-20 NOTE — PROGRESS NOTE ADULT - ATTENDING COMMENTS
82yo male with pmhx CAD s/p 3v CABG 2004, stents in 2019, HLD, HTN, PAT(on Eliquis), PVD(s/p stents in b/l legs -- right leg in 2020), osteomyelitis, COPD on prn home o2 and nocturnal bipap, type 2 Dm  presents to ED with leg pain x 1 week , found to have CT findings c/w abscess in left femur Plan: cont iv antibx, apprec ortho recs, apprec cardio recs, monitor clinical course, hold ac and aplt, may consult hemat for further input, awaiting IR drainage of abscess, MR f/u

## 2021-12-20 NOTE — PROGRESS NOTE ADULT - SUBJECTIVE AND OBJECTIVE BOX
YUE     PLV 1EAS 103 D1    Allergies    No Known Allergies    Intolerances    shellfish (Nausea)      PAST MEDICAL & SURGICAL HISTORY:  Diabetes Mellitus, Type II    CAD (Coronary Artery Disease)  s/p 3v CABG ; [facility]    Dyslipidemia    Osteomyelitis    COPD (chronic obstructive pulmonary disease)  on 2L at home and BiPAP at night; intubated     Hypertension    PVD (peripheral vascular disease)    History of PAT (paroxysmal atrial tachycardia)    CABG (Coronary Artery Bypass Graft)      Compound fracture  left leg    S/P primary angioplasty with coronary stent        FAMILY HISTORY:  Family history of diabetes mellitus (Sibling)    Family hx of lung cancer  brother,  age 82, used to smoke with pt        Home Medications:  azithromycin 250 mg oral tablet: 1 tab(s) orally Monday, Wednesday, and Friday (18 Dec 2021 01:56)  Breo Ellipta 200 mcg-25 mcg/inh inhalation powder: 1 puff(s) inhaled once a day (18 Dec 2021 01:56)  Incruse Ellipta 62.5 mcg/inh inhalation powder: 1 puff(s) inhaled every 24 hours (18 Dec 2021 01:56)  Jardiance 25 mg oral tablet: 1 tab(s) orally once a day (in the morning) (18 Dec 2021 01:56)  losartan 25 mg oral tablet: 1 tab(s) orally once a day (18 Dec 2021 01:56)  metFORMIN 1000 mg oral tablet: 1 tab(s) orally 2 times a day w/food  please resume the dose on 08/10/2021  (18 Dec 2021 01:56)  NovoLOG FlexPen 100 units/mL injectable solution: Inject 5 units at BS over 200, 10 units at BS over 300, and 15 units at BS over 400 (18 Dec 2021 01:56)  Nucala 100 mg subcutaneous injection: 100 milligram(s) subcutaneous every 4 weeks    *Last given 21* (18 Dec 2021 01:56)  predniSONE 2 mg oral delayed release tablet: 2 tab(s) orally once a day (18 Dec 2021 01:56)  rosuvastatin 20 mg oral tablet: 1 tab(s) orally once a day (at bedtime) (18 Dec 2021 01:56)  tamsulosin 0.4 mg oral capsule: 1 cap(s) orally once a day (at bedtime) (18 Dec 2021 01:56)      MEDICATIONS  (STANDING):  atorvastatin 80 milliGRAM(s) Oral at bedtime  azithromycin   Tablet 250 milliGRAM(s) Oral <User Schedule>  budesonide 160 MICROgram(s)/formoterol 4.5 MICROgram(s) Inhaler 2 Puff(s) Inhalation two times a day  cefepime   IVPB 2000 milliGRAM(s) IV Intermittent every 12 hours  clopidogrel Tablet 75 milliGRAM(s) Oral daily  dextrose 40% Gel 15 Gram(s) Oral once  dextrose 5%. 1000 milliLiter(s) (50 mL/Hr) IV Continuous <Continuous>  dextrose 5%. 1000 milliLiter(s) (100 mL/Hr) IV Continuous <Continuous>  dextrose 50% Injectable 25 Gram(s) IV Push once  dextrose 50% Injectable 12.5 Gram(s) IV Push once  dextrose 50% Injectable 25 Gram(s) IV Push once  glucagon  Injectable 1 milliGRAM(s) IntraMuscular once  insulin lispro (ADMELOG) corrective regimen sliding scale   SubCutaneous three times a day before meals  insulin lispro (ADMELOG) corrective regimen sliding scale   SubCutaneous at bedtime  metoprolol tartrate 50 milliGRAM(s) Oral two times a day  predniSONE   Tablet 4 milliGRAM(s) Oral daily  sodium chloride 0.9%. 1000 milliLiter(s) (60 mL/Hr) IV Continuous <Continuous>  tamsulosin 0.4 milliGRAM(s) Oral at bedtime  tiotropium 18 MICROgram(s) Capsule 1 Capsule(s) Inhalation daily  vancomycin  IVPB 1500 milliGRAM(s) IV Intermittent every 24 hours    MEDICATIONS  (PRN):  acetaminophen     Tablet .. 650 milliGRAM(s) Oral every 6 hours PRN Mild Pain (1 - 3), Moderate Pain (4 - 6)  melatonin 5 milliGRAM(s) Oral at bedtime PRN Insomnia      Diet, NPO:   NPO for Procedure/Test     NPO Start Date: 19-Dec-2021,   NPO Start Time: 23:59  Except Medications (21 @ 17:44) [Active]  Diet, Consistent Carbohydrate w/Evening Snack:   DASH/TLC Sodium & Cholesterol Restricted (21 @ 17:43) [Active]          Vital Signs Last 24 Hrs  T(C): 37.1 (19 Dec 2021 21:15), Max: 37.1 (19 Dec 2021 21:15)  T(F): 98.7 (19 Dec 2021 21:15), Max: 98.7 (19 Dec 2021 21:15)  HR: 83 (20 Dec 2021 06:34) (71 - 92)  BP: 123/70 (20 Dec 2021 06:34) (105/67 - 124/71)  BP(mean): --  RR: 18 (19 Dec 2021 21:15) (18 - 18)  SpO2: 98% (20 Dec 2021 06:34) (95% - 98%)      21 @ 07:01  -  21 @ 07:00  --------------------------------------------------------  IN: 0 mL / OUT: 250 mL / NET: -250 mL              LABS:                        11.7   6.53  )-----------( 335      ( 20 Dec 2021 05:37 )             37.8         143  |  107  |  23  ----------------------------<  161<H>  4.0   |  32<H>  |  1.40<H>    Ca    9.9      20 Dec 2021 05:37    TPro  7.0  /  Alb  2.6<L>  /  TBili  0.3  /  DBili  x   /  AST  11<L>  /  ALT  11<L>  /  AlkPhos  91                WBC:  WBC Count: 6.53 K/uL ( @ 05:37)  WBC Count: 7.67 K/uL ( @ 09:06)  WBC Count: 7.49 K/uL ( @ 04:53)  WBC Count: 8.95 K/uL ( @ 17:43)      MICROBIOLOGY:  RECENT CULTURES:   .Blood Blood XXXX XXXX   No growth to date.                    Sodium:  Sodium, Serum: 143 mmol/L ( @ 05:37)  Sodium, Serum: 142 mmol/L ( @ 09:06)  Sodium, Serum: 139 mmol/L ( @ 13:41)  Sodium, Serum: 138 mmol/L ( @ 17:43)      1.40 mg/dL  @ 05:37  1.40 mg/dL  @ 09:06  1.50 mg/dL  @ 13:41  1.60 mg/dL  @ 17:43      Hemoglobin:  Hemoglobin: 11.7 g/dL ( @ 05:37)  Hemoglobin: 11.6 g/dL ( @ 09:06)  Hemoglobin: 11.1 g/dL ( @ 04:53)  Hemoglobin: 12.8 g/dL ( @ 17:43)      Platelets: Platelet Count - Automated: 335 K/uL ( @ 05:37)  Platelet Count - Automated: 334 K/uL ( @ 09:06)  Platelet Count - Automated: 334 K/uL ( @ 04:53)  Platelet Count - Automated: 352 K/uL ( @ 17:43)      LIVER FUNCTIONS - ( 20 Dec 2021 05:37 )  Alb: 2.6 g/dL / Pro: 7.0 g/dL / ALK PHOS: 91 U/L / ALT: 11 U/L / AST: 11 U/L / GGT: x                 RADIOLOGY & ADDITIONAL STUDIES:      MICROBIOLOGY:  RECENT CULTURES:   .Blood Blood XXXX XXXX   No growth to date.

## 2021-12-20 NOTE — PROGRESS NOTE ADULT - PROBLEM SELECTOR PLAN 4
- chronic  - hold home metformin and Jardiance  - Continue MDSS   - hypoglycemia protocol, accuchecks   - A1c 7.6

## 2021-12-20 NOTE — PROGRESS NOTE ADULT - SUBJECTIVE AND OBJECTIVE BOX
[facility]  INFECTIOUS DISEASES   [address] [place] [address]  Tel: [phone]     Fax: [phone]  ======================================================  MD Noman Perry Kaushal, MD Cho, Michelle, MD   ======================================================    [facility]-[id number]  [facility]     Follow up: Left femoral OM and intramedullary abscess     No fever, no new complaint, no new changes.   Seen by ortho for possible IR drainage and drain placement today.     PAST MEDICAL & SURGICAL HISTORY:  Diabetes Mellitus, Type II  CAD (Coronary Artery Disease)  s/p 3v CABG ; [place]   Dyslipidemia  Osteomyelitis  COPD (chronic obstructive pulmonary disease)  on 2L at home and BiPAP at night; intubated   Hypertension  PVD (peripheral vascular disease)  History of PAT (paroxysmal atrial tachycardia)  CABG (Coronary Artery Bypass Graft)    Compound fracture  left leg  S/P primary angioplasty with coronary stent    Social Hx: Former smoker, no ETOH or drugs     FAMILY HISTORY:  Family history of diabetes mellitus (Sibling)  Family hx of lung cancer  brother,  age 82, used to smoke with pt  Allergies  No Known Allergies    Intolerances  shellfish (Nausea)    Antibiotics:  MEDICATIONS  (STANDING):  atorvastatin 80 milliGRAM(s) Oral at bedtime  azithromycin   Tablet 250 milliGRAM(s) Oral <User Schedule>  budesonide 160 MICROgram(s)/formoterol 4.5 MICROgram(s) Inhaler 2 Puff(s) Inhalation two times a day  cefepime   IVPB 2000 milliGRAM(s) IV Intermittent every 12 hours  clopidogrel Tablet 75 milliGRAM(s) Oral daily  dextrose 40% Gel 15 Gram(s) Oral once  dextrose 5%. 1000 milliLiter(s) (50 mL/Hr) IV Continuous <Continuous>  dextrose 5%. 1000 milliLiter(s) (100 mL/Hr) IV Continuous <Continuous>  dextrose 50% Injectable 25 Gram(s) IV Push once  dextrose 50% Injectable 12.5 Gram(s) IV Push once  dextrose 50% Injectable 25 Gram(s) IV Push once  glucagon  Injectable 1 milliGRAM(s) IntraMuscular once  insulin lispro (ADMELOG) corrective regimen sliding scale   SubCutaneous three times a day before meals  insulin lispro (ADMELOG) corrective regimen sliding scale   SubCutaneous at bedtime  metoprolol tartrate 50 milliGRAM(s) Oral two times a day  predniSONE   Tablet 4 milliGRAM(s) Oral daily  sodium chloride 0.9%. 1000 milliLiter(s) (60 mL/Hr) IV Continuous <Continuous>  tamsulosin 0.4 milliGRAM(s) Oral at bedtime  tiotropium 18 MICROgram(s) Capsule 1 Capsule(s) Inhalation daily  vancomycin  IVPB 1500 milliGRAM(s) IV Intermittent every 24 hours     REVIEW OF SYSTEMS:  CONSTITUTIONAL:  No Fever or chills  HEENT:  No diplopia or blurred vision.  No sore throat or runny nose.  CARDIOVASCULAR:  No chest pain or SOB.  RESPIRATORY:  No cough, shortness of breath, PND or orthopnea.  GASTROINTESTINAL:  No nausea, vomiting or diarrhea.  GENITOURINARY:  No dysuria, frequency or urgency. No Blood in urine  MUSCULOSKELETAL: left leg pain   SKIN:  No change in skin, hair or nails.  NEUROLOGIC:  No paresthesias, fasciculations, seizures or weakness.  PSYCHIATRIC:  No disorder of thought or mood.  ENDOCRINE:  No heat or cold intolerance, polyuria or polydipsia.  HEMATOLOGICAL:  No easy bruising or bleeding.     Physical Exam:  Vital Signs Last 24 Hrs  T(C): 37.1 (19 Dec 2021 21:15), Max: 37.1 (19 Dec 2021 21:15)  T(F): 98.7 (19 Dec 2021 21:15), Max: 98.7 (19 Dec 2021 21:15)  HR: 83 (20 Dec 2021 06:34) (71 - 92)  BP: 123/70 (20 Dec 2021 06:34) (105/67 - 124/71)  BP(mean): --  RR: 18 (19 Dec 2021 21:15) (18 - 18)  SpO2: 98% (20 Dec 2021 06:34) (95% - 98%)  GEN: NAD, obese   HEENT: normocephalic and atraumatic. EOMI. PERRL.    NECK: Supple.  No lymphadenopathy   LUNGS: Clear to auscultation.  HEART: Regular rate and rhythm   ABDOMEN: Soft, nontender, and nondistended.  Positive bowel sounds.    : No CVA tenderness  EXTREMITIES: left leg in thigh area has a scar in lateral side with no discharge or open wound.  Swelling in anterior part, no tenderness or fluctuation. No sign of cellulitis on soft tissue.   NEUROLOGIC: grossly intact.  PSYCHIATRIC: Appropriate affect .  SKIN: No rash, as above     Labs:                        11.7   6.53  )-----------( 335      ( 20 Dec 2021 05:37 )             37.8         143  |  107  |  23  ----------------------------<  161<H>  4.0   |  32<H>  |  1.40<H>    Ca    9.9      20 Dec 2021 05:37    TPro  7.0  /  Alb  2.6<L>  /  TBili  0.3  /  DBili  x   /  AST  11<L>  /  ALT  11<L>  /  AlkPhos  91      Culture - Blood (collected 21 @ 21:22)  Source: .Blood Blood    Culture - Blood (collected 21 @ 21:22)  Source: .Blood Blood    WBC Count: 6.53 K/uL (21 @ 05:37)  WBC Count: 7.67 K/uL (21 @ 09:06)  WBC Count: 7.49 K/uL (21 @ 04:53)  WBC Count: 8.95 K/uL (21 @ 17:43)    Creatinine, Serum: 1.40 mg/dL (21 @ 05:37)  Creatinine, Serum: 1.40 mg/dL (21 @ 09:06)  Creatinine, Serum: 1.50 mg/dL (21 @ 13:41)  Creatinine, Serum: 1.60 mg/dL (21 @ 17:43)    C-Reactive Protein, Serum: 81 mg/L (21 @ 15:37)  C-Reactive Protein, Serum: 70 mg/L (21 @ 17:04)    Sedimentation Rate, Erythrocyte: 60 mm/hr (21 @ 09:06)  Sedimentation Rate, Erythrocyte: 42 mm/hr (21 @ 10:44)     COVID-19 PCR: NotDetec (21 @ 17:43)    All imaging and other data have been reviewed.  < from: CT Femur No Cont, Left (21 @ 22:24) >  IMPRESSION:  Old healed deformity of the mid left femur likely correlating with the   history of a prior osteomyelitis. Complex heterogeneous lobulated soft   tissue anterior and lateral to the middle two thirds of the left femur   concerning for an abscess. New lobulated intramedullary defect with tract   extending to the cortical surface in the mid left femur concerning for a   possible intramedullary abscess. A contrast-enhancedMRI of the left   femur is recommended for further evaluation.    Assessment and Plan:   80yo man with PMH of HTN, COPD on home O2, CAD s/p CABG, PVD s/p stents in b/l legs and left femoral fracture more than 50 years ago, was admitted with left leg pain on the site of old fracture after CT showed possible intramedullary abscess. He has had pain and infection in the old fracture area at least 3-4 times in the past, had multiple surgeries and drainage of area with a long term antibiotic treatment, last one years ago.   Most likely another infection and osteomyelitis with collection in area that needs intervention by ortho. Will cover empirically with 4th Gen cephalosporins and vancomycin until we have culture info.   Doppler neg for DVTs  Admission WBC normal, no fever  ESR=60     CRP=70    1- Left femoral OM and intramedullary abscess   - Blood cultures NGTD   - MRI showed collection, intraosseous abscess   - Ortho consult noted, will need IR drain placement please send for cultures and pathology   - Continue cefepime 2gm q12 adjusted for renal function   - Continue vancomycin 1500mg daily, trough was 11 before 3rd dose will check before 4th dose if less than 15 then will increase vancomycin dose   - Follow creatinine to adjust ABx doses, mild MERRILL/CKD, creat improving   - Based on culture will need a long treatment with IV antibiotics will need a PICC line    2- COPD  - Continue azithromycin 250mg 3times weekly prophylactically   - Pulmonary consult noted  - On o2 and BiPAP at night time      Will follow.    Brandi العلي MD  Division of Infectious Diseases   Cell [phone] between 8am and 6pm   After 6pm and weekends please call ID service at [phone].     >25 minutes spent on total encounter assessing patient, examination, chart reivew, counseling and coordinating care by the attending physician/nurse/care manager.

## 2021-12-20 NOTE — PROGRESS NOTE ADULT - ASSESSMENT
MARISSAALAN TURNER 81 m 2021 [facility] P [phone]  1940  VERNON ESPINOZA    REVIEW OF SYMPTOMS      Able to give ROS  Yes     RELIABLE +/-   CONSTITUTIONAL Weakness Yes  Chills No   ENDOCRINE  No heat or cold intolerance    ALLERGY No hives  Sore throat No stridor  RESP Coughing blood no  Shortness of breath YES   NEURO No Headache  Confusion Pain neck No   CARDIAC No Chest pain No Palpitations   GI  Pain abdomen NO   Vomiting NO     PHYSICAL EXAM    HEENT Unremarkable  atraumatic   RESP Fair air entry EXP prolonged    Harsh breath sound Resp distres mild   CARDIAC S1 S2 No S3     NO JVD    ABDOMEN SOFT BS PRESENT NOT DISTENDED No hepatosplenomegaly PEDAL EDEMA present No calf tenderness  NO rash       ________________  PATIENT PRESENTATION.   82yo man with PMH of HTN, COPD on home O2, CAD s/p CABG, PVD s/p stents in b/l legs and left femoral fracture more than 50 years ago, was admitted 2021 with left leg pain on the site of old fracture after CT showed possible intramedullary abscess. He has had pain and infection in the old fracture area at least 3-4 times in the past, had multiple surgeries and drainage of area with a long term antibiotic treatment, last one years ago.   Most likely he is here now  with another infection and osteomyelitis with collection in area that needs intervention by ortho. Is on coverage empirically with 4th Gen cephalosporins and vancomycin until  culture info.   Doppler neg for DVTs  Admission WBC normal, no fever  ESR=60     CRP=70      ________________  HOSPITAL COURSE.   OM Femur  COPD   RESP FAILURE  CAD    _____                            GOC. 2021 Full code   COVID STATUS.   ICU STAY. none  ABIO.    AZITHRO q MWF    Cefepime 2.2    Vanco 1.5/d        GENERAL ISSUES                                       ALLERGY.   shellfish                         WEIGHT.  2021 97                                   BMI.            2021 30     ASSESSMENT/RECOMMENDATIONS.    RESP.   is on noct bpap    HEMODYNAMICS.   bp ok    VANCO t   2021 VT 11.7    INFECTION.   Possible osteomyelitis   w 2021 w 6.5   mr femur with contr   Multiloculated nm enhancing abscess along ant and lateral aspect of mid to distal femur cw osteomyelitis   blod c  (-)    AZITHRO q MWF    Cefepime 2.2    Vanco 1.5/d     COPD   symbicort 160    pred 4    symbicor     CAD.   metoprolol 50.2     ANEMIA  Hb 2021 Hb 11   Monitor     CKD  Cr 2021 Cr 1.4  Monitor       TIME SPENT   Over 25 minutes aggregate care time spent on encounter; activities included   direct patient care, counseling and/or coordinating care reviewing notes, lab data/ imaging , discussion with multidisciplinary team/ patient  /family and explaining in detail risks, benefits, alternatives  of the recommendations     COMMODORE TURNER 81 m 2021 [facility] P [phone]  1940  VERNON ESPINOZA

## 2021-12-20 NOTE — PROGRESS NOTE ADULT - ASSESSMENT
The patient is an 80yo male with pmhx CAD s/p 3v CABG 2004, stents in 2019, HLD, HTN, PAT(on Eliquis), PVD(s/p stents in b/l legs -- right leg in 2020), osteomyelitis, COPD on prn home o2 and nocturnal bipap, type 2 Dm  presents to ED with leg pain x 1 week , found to have CT findings c/w abscess in left femur

## 2021-12-21 ENCOUNTER — APPOINTMENT (OUTPATIENT)
Dept: PULMONOLOGY | Facility: CLINIC | Age: 81
End: 2021-12-21

## 2021-12-21 LAB
ANION GAP SERPL CALC-SCNC: 6 MMOL/L — SIGNIFICANT CHANGE UP (ref 5–17)
BUN SERPL-MCNC: 23 MG/DL — SIGNIFICANT CHANGE UP (ref 7–23)
CALCIUM SERPL-MCNC: 9.6 MG/DL — SIGNIFICANT CHANGE UP (ref 8.5–10.1)
CHLORIDE SERPL-SCNC: 108 MMOL/L — SIGNIFICANT CHANGE UP (ref 96–108)
CO2 SERPL-SCNC: 30 MMOL/L — SIGNIFICANT CHANGE UP (ref 22–31)
CREAT SERPL-MCNC: 1.4 MG/DL — HIGH (ref 0.5–1.3)
GLUCOSE SERPL-MCNC: 186 MG/DL — HIGH (ref 70–99)
HCT VFR BLD CALC: 38.8 % — LOW (ref 39–50)
HGB BLD-MCNC: 12.1 G/DL — LOW (ref 13–17)
MAGNESIUM SERPL-MCNC: 2.1 MG/DL — SIGNIFICANT CHANGE UP (ref 1.6–2.6)
MCHC RBC-ENTMCNC: 26.9 PG — LOW (ref 27–34)
MCHC RBC-ENTMCNC: 31.2 GM/DL — LOW (ref 32–36)
MCV RBC AUTO: 86.2 FL — SIGNIFICANT CHANGE UP (ref 80–100)
NRBC # BLD: 0 /100 WBCS — SIGNIFICANT CHANGE UP (ref 0–0)
PHOSPHATE SERPL-MCNC: 3.1 MG/DL — SIGNIFICANT CHANGE UP (ref 2.5–4.5)
PLATELET # BLD AUTO: 325 K/UL — SIGNIFICANT CHANGE UP (ref 150–400)
POTASSIUM SERPL-MCNC: 4.3 MMOL/L — SIGNIFICANT CHANGE UP (ref 3.5–5.3)
POTASSIUM SERPL-SCNC: 4.3 MMOL/L — SIGNIFICANT CHANGE UP (ref 3.5–5.3)
RBC # BLD: 4.5 M/UL — SIGNIFICANT CHANGE UP (ref 4.2–5.8)
RBC # FLD: 13.2 % — SIGNIFICANT CHANGE UP (ref 10.3–14.5)
SODIUM SERPL-SCNC: 144 MMOL/L — SIGNIFICANT CHANGE UP (ref 135–145)
VANCOMYCIN TROUGH SERPL-MCNC: 13.4 UG/ML — SIGNIFICANT CHANGE UP (ref 10–20)
WBC # BLD: 6.98 K/UL — SIGNIFICANT CHANGE UP (ref 3.8–10.5)
WBC # FLD AUTO: 6.98 K/UL — SIGNIFICANT CHANGE UP (ref 3.8–10.5)

## 2021-12-21 PROCEDURE — 99232 SBSQ HOSP IP/OBS MODERATE 35: CPT

## 2021-12-21 PROCEDURE — 99233 SBSQ HOSP IP/OBS HIGH 50: CPT | Mod: GC

## 2021-12-21 RX ORDER — ACETAMINOPHEN 500 MG
1000 TABLET ORAL ONCE
Refills: 0 | Status: COMPLETED | OUTPATIENT
Start: 2021-12-21 | End: 2021-12-21

## 2021-12-21 RX ADMIN — BUDESONIDE AND FORMOTEROL FUMARATE DIHYDRATE 2 PUFF(S): 160; 4.5 AEROSOL RESPIRATORY (INHALATION) at 08:43

## 2021-12-21 RX ADMIN — Medication 4: at 08:40

## 2021-12-21 RX ADMIN — Medication 4: at 17:08

## 2021-12-21 RX ADMIN — Medication 4 MILLIGRAM(S): at 06:20

## 2021-12-21 RX ADMIN — Medication 81 MILLIGRAM(S): at 12:34

## 2021-12-21 RX ADMIN — BUDESONIDE AND FORMOTEROL FUMARATE DIHYDRATE 2 PUFF(S): 160; 4.5 AEROSOL RESPIRATORY (INHALATION) at 19:00

## 2021-12-21 RX ADMIN — Medication 1000 MILLIGRAM(S): at 10:46

## 2021-12-21 RX ADMIN — Medication 50 MILLIGRAM(S): at 06:19

## 2021-12-21 RX ADMIN — Medication 6: at 12:34

## 2021-12-21 RX ADMIN — Medication 300 MILLIGRAM(S): at 07:51

## 2021-12-21 RX ADMIN — CEFEPIME 100 MILLIGRAM(S): 1 INJECTION, POWDER, FOR SOLUTION INTRAMUSCULAR; INTRAVENOUS at 06:19

## 2021-12-21 RX ADMIN — Medication 1000 MILLIGRAM(S): at 09:46

## 2021-12-21 RX ADMIN — Medication 50 MILLIGRAM(S): at 17:08

## 2021-12-21 RX ADMIN — TIOTROPIUM BROMIDE 1 CAPSULE(S): 18 CAPSULE ORAL; RESPIRATORY (INHALATION) at 08:43

## 2021-12-21 RX ADMIN — TAMSULOSIN HYDROCHLORIDE 0.4 MILLIGRAM(S): 0.4 CAPSULE ORAL at 23:01

## 2021-12-21 RX ADMIN — ATORVASTATIN CALCIUM 80 MILLIGRAM(S): 80 TABLET, FILM COATED ORAL at 23:01

## 2021-12-21 RX ADMIN — CEFEPIME 100 MILLIGRAM(S): 1 INJECTION, POWDER, FOR SOLUTION INTRAMUSCULAR; INTRAVENOUS at 17:08

## 2021-12-21 NOTE — PROGRESS NOTE ADULT - ASSESSMENT
COMMODORE TURNER 81 m 2021 [facility] P [phone]  1940  VERNON ESPINOZA    REVIEW OF SYMPTOMS      Able to give ROS  Yes     RELIABLE +/-   CONSTITUTIONAL Weakness Yes  Chills No   ENDOCRINE  No heat or cold intolerance    ALLERGY No hives  Sore throat No stridor  RESP Coughing blood no  Shortness of breath YES   NEURO No Headache  Confusion Pain neck No   CARDIAC No Chest pain No Palpitations   GI  Pain abdomen NO   Vomiting NO     PHYSICAL EXAM    HEENT Unremarkable  atraumatic   RESP Fair air entry EXP prolonged    Harsh breath sound Resp distres mild   CARDIAC S1 S2 No S3     NO JVD    ABDOMEN SOFT BS PRESENT NOT DISTENDED No hepatosplenomegaly PEDAL EDEMA present No calf tenderness  NO rash       ________________  PATIENT PRESENTATION.   80yo man with PMH of HTN, COPD on home O2, CAD s/p CABG, PVD s/p stents in b/l legs and left femoral fracture more than 50 years ago, was admitted 2021 with left leg pain on the site of old fracture after CT showed possible intramedullary abscess. He has had pain and infection in the old fracture area at least 3-4 times in the past, had multiple surgeries and drainage of area with a long term antibiotic treatment, last one years ago.   Most likely he is here now  with another infection and osteomyelitis with collection in area that needs intervention by ortho. Is on coverage empirically with 4th Gen cephalosporins and vancomycin until  culture info.   Doppler neg for DVTs  Admission WBC normal, no fever  ESR=60     CRP=70      ________________  HOSPITAL COURSE.   OM Femur   Cefepime 2.2    Vanco 1.5/d   COPD on home O2  NOCT BPAP   RESP FAILURE    COPD ASTHMA Former smoker 2019 FVC 2.20 58% Post 2.61 69% +18 FEV1   0.86 31% Post 0.89 32% +4%  FEV1% 39%     INTUBATION CAC 2018   CAD sp cabg pmh  PVD stents pmh   l FEM FX YR AGO With local episodic infectn    _____                            GOC. 2021 Full code   COVID STATUS.   ICU STAY. none  ABIO.    AZITHRO q MWF    Cefepime 2.2    Vanco 1.5/d     GENERAL ISSUES                                       ALLERGY.   shellfish                         WEIGHT.  2021 97                                   BMI.            2021 30  IV FLUIDS.     PATIENT DATA   VITALS/PO/IO/VENT/DRIPS.    2021 afeb 79 100/70   2021 nc 95%       ASSESSMENT/RECOMMENDATIONS.    RESP.   is on noct bpap    HEMODYNAMICS.   bp ok    VANCO t   -2021 VT 11.7-13     INFECTION.   Possible osteomyelitis   w 2021 w 6.5   mr femur with contr   Multiloculated nm enhancing abscess along ant and lateral aspect of mid to distal femur cw osteomyelitis   blod c  (-)    AZITHRO q MWF    Cefepime 2.2    Vanco 1.5/d     COPD   symbicort 160    pred 4    symbicor     CAD.   metoprolol 50.2    asa 81     ANEMIA  Hb 2021 Hb 11   Monitor     CKD  Cr 2021 Cr 1.4  Monitor     TIME SPENT   Over 25 minutes aggregate care time spent on encounter; activities included   direct patient care, counseling and/or coordinating care reviewing notes, lab data/ imaging , discussion with multidisciplinary team/ patient  /family and explaining in detail risks, benefits, alternatives  of the recommendations     COMMODORE TURNER 81 m 2021 [facility] P [phone]  1940  VERNON ESPINOZA

## 2021-12-21 NOTE — PROVIDER CONTACT NOTE (OTHER) - ASSESSMENT
Pt c/o neck pain 8/10. Requesting something stronger than Tylenol 650mg.
Pt A&Ox4, no complaints, no signs of distress

## 2021-12-21 NOTE — PROGRESS NOTE ADULT - SUBJECTIVE AND OBJECTIVE BOX
[facility]  INFECTIOUS DISEASES   [address] [place] [address]  Tel: [phone]     Fax: [phone]  ======================================================  MD Noman Perry Kaushal, MD Cho, Michelle, MD   ======================================================    [facility]-[id number]  [facility]     Follow up: Left femoral OM and intramedullary abscess     No fever, no new complaint, no new changes.   Seen by ortho for possible IR drainage and drain placement today.     PAST MEDICAL & SURGICAL HISTORY:  Diabetes Mellitus, Type II  CAD (Coronary Artery Disease)  s/p 3v CABG ; stents placed in [place] in   Dyslipidemia  Osteomyelitis  COPD (chronic obstructive pulmonary disease)  on 2L at home and BiPAP at night; intubated   Hypertension  PVD (peripheral vascular disease)  History of PAT (paroxysmal atrial tachycardia)  CABG (Coronary Artery Bypass Graft)    Compound fracture  left leg  S/P primary angioplasty with coronary stent    Social Hx: Former smoker, no ETOH or drugs     FAMILY HISTORY:  Family history of diabetes mellitus (Sibling)  Family hx of lung cancer  brother,  age 82, used to smoke with pt  Allergies  No Known Allergies    Intolerances  shellfish (Nausea)    Antibiotics:  MEDICATIONS  (STANDING):  atorvastatin 80 milliGRAM(s) Oral at bedtime  azithromycin   Tablet 250 milliGRAM(s) Oral <User Schedule>  budesonide 160 MICROgram(s)/formoterol 4.5 MICROgram(s) Inhaler 2 Puff(s) Inhalation two times a day  cefepime   IVPB 2000 milliGRAM(s) IV Intermittent every 12 hours  clopidogrel Tablet 75 milliGRAM(s) Oral daily  dextrose 40% Gel 15 Gram(s) Oral once  dextrose 5%. 1000 milliLiter(s) (50 mL/Hr) IV Continuous <Continuous>  dextrose 5%. 1000 milliLiter(s) (100 mL/Hr) IV Continuous <Continuous>  dextrose 50% Injectable 25 Gram(s) IV Push once  dextrose 50% Injectable 12.5 Gram(s) IV Push once  dextrose 50% Injectable 25 Gram(s) IV Push once  glucagon  Injectable 1 milliGRAM(s) IntraMuscular once  insulin lispro (ADMELOG) corrective regimen sliding scale   SubCutaneous three times a day before meals  insulin lispro (ADMELOG) corrective regimen sliding scale   SubCutaneous at bedtime  metoprolol tartrate 50 milliGRAM(s) Oral two times a day  predniSONE   Tablet 4 milliGRAM(s) Oral daily  sodium chloride 0.9%. 1000 milliLiter(s) (60 mL/Hr) IV Continuous <Continuous>  tamsulosin 0.4 milliGRAM(s) Oral at bedtime  tiotropium 18 MICROgram(s) Capsule 1 Capsule(s) Inhalation daily  vancomycin  IVPB 1500 milliGRAM(s) IV Intermittent every 24 hours     REVIEW OF SYSTEMS:  CONSTITUTIONAL:  No Fever or chills  HEENT:  No diplopia or blurred vision.  No sore throat or runny nose.  CARDIOVASCULAR:  No chest pain or SOB.  RESPIRATORY:  No cough, shortness of breath, PND or orthopnea.  GASTROINTESTINAL:  No nausea, vomiting or diarrhea.  GENITOURINARY:  No dysuria, frequency or urgency. No Blood in urine  MUSCULOSKELETAL: left leg pain   SKIN:  No change in skin, hair or nails.  NEUROLOGIC:  No paresthesias, fasciculations, seizures or weakness.  PSYCHIATRIC:  No disorder of thought or mood.  ENDOCRINE:  No heat or cold intolerance, polyuria or polydipsia.  HEMATOLOGICAL:  No easy bruising or bleeding.     Physical Exam:  Vital Signs Last 24 Hrs  T(C): 36.8 (21 Dec 2021 05:26), Max: 36.8 (20 Dec 2021 14:05)  T(F): 98.2 (21 Dec 2021 05:26), Max: 98.2 (20 Dec 2021 14:05)  HR: 101 (21 Dec 2021 05:26) (88 - 101)  BP: 138/85 (21 Dec 2021 05:26) (117/70 - 138/85)  BP(mean): --  RR: 17 (21 Dec 2021 05:26) (17 - 17)  SpO2: 93% (21 Dec 2021 05:26) (92% - 96%)  GEN: NAD, obese   HEENT: normocephalic and atraumatic. EOMI. PERRL.    NECK: Supple.  No lymphadenopathy   LUNGS: Clear to auscultation.  HEART: Regular rate and rhythm   ABDOMEN: Soft, nontender, and nondistended.  Positive bowel sounds.    : No CVA tenderness  EXTREMITIES: left leg in thigh area has a scar in lateral side with no discharge or open wound.  Swelling in anterior part, no tenderness or fluctuation. No sign of cellulitis on soft tissue.   NEUROLOGIC: grossly intact.  PSYCHIATRIC: Appropriate affect .  SKIN: No rash, as above     Labs:                        12.1   6.98  )-----------( 325      ( 21 Dec 2021 06:47 )             38.8         144  |  108  |  23  ----------------------------<  186<H>  4.3   |  30  |  1.40<H>    Ca    9.6      21 Dec 2021 06:47  Phos  3.1       Mg     2.1         TPro  7.0  /  Alb  2.6<L>  /  TBili  0.3  /  DBili  x   /  AST  11<L>  /  ALT  11<L>  /  AlkPhos  91      Culture - Blood (collected 21 @ 21:22)  Source: .Blood Blood    Culture - Blood (collected 21 @ 21:22)  Source: .Blood Blood    WBC Count: 6.98 K/uL (21 @ 06:47)  WBC Count: 6.53 K/uL (21 @ 05:37)  WBC Count: 7.67 K/uL (21 @ 09:06)  WBC Count: 7.49 K/uL (21 @ 04:53)  WBC Count: 8.95 K/uL (21 @ 17:43)    Creatinine, Serum: 1.40 mg/dL (21 @ 06:47)  Creatinine, Serum: 1.40 mg/dL (21 @ 05:37)  Creatinine, Serum: 1.40 mg/dL (21 @ 09:06)  Creatinine, Serum: 1.50 mg/dL (21 @ 13:41)  Creatinine, Serum: 1.60 mg/dL (21 @ 17:43)    C-Reactive Protein, Serum: 81 mg/L (21 @ 15:37)  C-Reactive Protein, Serum: 70 mg/L (21 @ 17:04)    Sedimentation Rate, Erythrocyte: 60 mm/hr (21 @ 09:06)  Sedimentation Rate, Erythrocyte: 42 mm/hr (21 @ 10:44)    COVID-19 PCR: NotDetec (21 @ 17:43)    All imaging and other data have been reviewed.  < from: CT Femur No Cont, Left (21 @ 22:24) >  IMPRESSION:  Old healed deformity of the mid left femur likely correlating with the   history of a prior osteomyelitis. Complex heterogeneous lobulated soft   tissue anterior and lateral to the middle two thirds of the left femur   concerning for an abscess. New lobulated intramedullary defect with tract   extending to the cortical surface in the mid left femur concerning for a   possible intramedullary abscess. A contrast-enhancedMRI of the left   femur is recommended for further evaluation.    Assessment and Plan:   80yo man with PMH of HTN, COPD on home O2, CAD s/p CABG, PVD s/p stents in b/l legs and left femoral fracture more than 50 years ago, was admitted with left leg pain on the site of old fracture after CT showed possible intramedullary abscess. He has had pain and infection in the old fracture area at least 3-4 times in the past, had multiple surgeries and drainage of area with a long term antibiotic treatment, last one years ago.   Most likely another infection and osteomyelitis with collection in area that needs intervention by ortho. Will cover empirically with 4th Gen cephalosporins and vancomycin until we have culture info.   Doppler neg for DVTs  Admission WBC normal, no fever  ESR=60     CRP=70    1- Left femoral OM and intramedullary abscess   - Blood cultures NGTD   - MRI showed collection, intraosseous abscess   - Ortho consult noted, will need IR drain placement, please send for cultures and pathology   - Continue cefepime 2gm q12 adjusted for renal function   - Continue vancomycin 1500mg daily, trough was 13.2 will check one more trough if less than 15 can switch to 1gm q12.   - Follow creatinine to adjust ABx doses, mild MERRILL/CKD, creat improving   - Based on culture will need a long treatment with IV antibiotics will need a PICC line    2- COPD  - Continue azithromycin 250mg 3times weekly prophylactically   - Pulmonary consult noted  - On o2 and BiPAP at night time      Will follow.    Brandi العلي MD  Division of Infectious Diseases   Cell [phone] between 8am and 6pm   After 6pm and weekends please call ID service at [phone].     >25 minutes spent on total encounter assessing patient, examination, chart reivew, counseling and coordinating care by the attending physician/nurse/care manager.

## 2021-12-21 NOTE — PROGRESS NOTE ADULT - SUBJECTIVE AND OBJECTIVE BOX
Patient seen and examined at bedside. Nasal cannula in place. Pain with ambulation of LLE. Denies pain when at rest, but endorses pain with palpation. No visible draining sinus tract. Denies subjective f/c.     VITAL SIGNS:  T(C): 36.8 (12-21-21 @ 05:26), Max: 36.8 (12-20-21 @ 14:05)  HR: 101 (12-21-21 @ 05:26) (88 - 101)  BP: 138/85 (12-21-21 @ 05:26) (117/70 - 138/85)  RR: 17 (12-21-21 @ 05:26) (17 - 17)  SpO2: 93% (12-21-21 @ 05:26) (92% - 96%)      LABS:                        12.1   6.98  )-----------( 325      ( 21 Dec 2021 06:47 )             38.8     12-21    144  |  108  |  23  ----------------------------<  186<H>  4.3   |  30  |  1.40<H>    Ca    9.6      21 Dec 2021 06:47  Phos  3.1     12-21  Mg     2.1     12-21    TPro  7.0  /  Alb  2.6<L>  /  TBili  0.3  /  DBili  x   /  AST  11<L>  /  ALT  11<L>  /  AlkPhos  91  12-20      Gen: NAD, Resting comfortably, aaox3  LLE  antereolateral surgical scar, well healed. Defect in muscle noted, no drainage. Palpable collection superior to surgical scar. No defect in skin, no clear sinus tract visualized  No bony tenderness to palpation  +EHL/FHL/TA/GSC  +SILT L3-S1  + DP  Compartments soft and compressible  No calf tenderness    A/P: 81M/F w/ osteomyelitis, concern for intramedullary abscess    Patient's vitals are stable, no indication of sepsis  To further assess fluid collection/abscess, recommend    Continue to hold plavix in anticipation of IR procedure  IR to drain intramedullary abscess following 5 days off Plavix  Will monitor clinical course and continuation of care with Dr. Varela   Analgesia  NWB LLE  DVT ppx with scds for now, possible surgical intervention/IR drainage   Discussed with attending who is in agreement with above plan Patient seen and examined at bedside. Nasal cannula in place. Pain with ambulation of LLE. Denies pain when at rest, but endorses pain with palpation. No visible draining sinus tract. Denies subjective f/c.     VITAL SIGNS:  T(C): 36.8 (12-21-21 @ 05:26), Max: 36.8 (12-20-21 @ 14:05)  HR: 101 (12-21-21 @ 05:26) (88 - 101)  BP: 138/85 (12-21-21 @ 05:26) (117/70 - 138/85)  RR: 17 (12-21-21 @ 05:26) (17 - 17)  SpO2: 93% (12-21-21 @ 05:26) (92% - 96%)      LABS:                        12.1   6.98  )-----------( 325      ( 21 Dec 2021 06:47 )             38.8     12-21    144  |  108  |  23  ----------------------------<  186<H>  4.3   |  30  |  1.40<H>    Ca    9.6      21 Dec 2021 06:47  Phos  3.1     12-21  Mg     2.1     12-21    TPro  7.0  /  Alb  2.6<L>  /  TBili  0.3  /  DBili  x   /  AST  11<L>  /  ALT  11<L>  /  AlkPhos  91  12-20      Gen: NAD, Resting comfortably, aaox3  LLE  antereolateral surgical scar, well healed. Defect in muscle noted, no drainage. Palpable collection superior to surgical scar. No defect in skin, no clear sinus tract visualized  No bony tenderness to palpation  +EHL/FHL/TA/GSC  +SILT L3-S1  + DP  Compartments soft and compressible  No calf tenderness    A/P: 81M/F w/ osteomyelitis, concern for intramedullary abscess    Patient's vitals are stable, no indication of sepsis  To further assess fluid collection/abscess, recommend    Continue to hold plavix in anticipation of IR procedure  IR to drain intramedullary abscess following 5 days off Plavix  Will monitor clinical course and continuation of care with Dr. Varela   Analgesia  TTWB LLE  DVT ppx with scds for now, possible surgical intervention/IR drainage   Discussed with attending who is in agreement with above plan

## 2021-12-21 NOTE — PROGRESS NOTE ADULT - ASSESSMENT
80yo male with pmhx DM2, CAD s/p 3v CABG 2004, stents in 2019, HLD, HTN, PAT(on Eliquis), PVD(s/p stents in b/l legs -- right leg in 2020), osteomyelitis, COPD on prn home o2 and nocturnal bipap, type 2 Dm who presented to ED with increasing left leg pain x 1 week.  Now found to have w/ osteomyelitis, concern for intramedullary abscess    CAD/HTN/HLD/PAT  there is no evidence for meaningful  volume overload.  TTE 8/3/21 w/ hypokinesis mild DD no need to repeat  continue  Lipitor, Lopressor  Losartan held 2/2 MERRILL  plavix on hold, for IR drain intramedullary abscess when off plavix for 5 days as per ortho notes  off eliquis pending procedure   on asa 81mg po daily     further plan and recommendations pending clinical course and results of above   Monitor and replete electrolytes. Keep K>4.0 and Mg>2.0.  Aziza Portillo FNP-C  Cardiology NP  [id number] [phone]

## 2021-12-21 NOTE — PROGRESS NOTE ADULT - PROBLEM SELECTOR PLAN 8
- CAD s/p 3v CABG 2004, stents in 2019  - continue beta blocker   - D/w IR and cardio - Hold Plavix and continue ASA 81 mg daily in setting of planned procedure  - On Eliquis for ? PAT, last dose given on 12/17, will resume following procedure pending okay from IR

## 2021-12-21 NOTE — CONSULT NOTE ADULT - ASSESSMENT
80yo man with pmhx CAD s/p 3v CABG 2004, stents in 2019, HLD, HTN, PAT(on Eliquis), PVD(s/p stents in b/l legs -- right leg in 2020), osteomyelitis, COPD on prn home o2 and nocturnal bipap, type 2 Dm who presented to ED with increasing left leg pain x 1 week. Hx left leg/thigh fx/multiple surg interventions since 50years ago.  Adm w incr left thigh pain.   CT and MRI of left femur suspicious for intramedullary abscess  For IR drainage of the abscess  Plavix stopped since yesterday    -duration of off anticoagulation should be determined by the physician doing the procedure, ie IR, also the physician that placed the pt on the Plavix and Eliquis (ie Cardiology and Vascular), as reasons may be vary and the determination of the optimal safety for procedure and disease process that necessitated the medications    -no primary hematology issues as absolute contraindications    discussed w pt, wife  discussed w Medicine  thank you, please call if any questions      
80yo male with pmhx DM2, CAD s/p 3v CABG 2004, stents in 2019, HLD, HTN, PAT(on Eliquis), PVD(s/p stents in b/l legs -- right leg in 2020), osteomyelitis, COPD on prn home o2 and nocturnal bipap, type 2 Dm who presented to ED with increasing left leg pain x 1 week.  Now found to have w/ osteomyelitis, concern for intramedullary abscess    Pt has been asymptomatic   -there is no evidence of acute ischemia.  -her ecg is w/o ischemic changes and unchanged when compared to prior ECG   - she has no anginal complaints  -Tele & ECG show ST, pt w/ hx of PAT currently in SR   -there is no evidence for meaningful  volume overload.  -TTE 8/3/21 w/ hypokinesis mild DD no need to repeat  -Cw current cardiac medications    -Pt is hemodynamically stable  -Monitor and replete lytes, keep K>4 and Mg >2  Thank you for the consult  Will continue to follow  Austin Salinas [facility]  Cardiology   [facility] #[phone]   
82yo male with pmhx CAD s/p 3v CABG 2004, stents in 2019, HLD, HTN, PAT(on Eliquis), PVD(s/p stents in b/l legs -- right leg in 2020), osteomyelitis, COPD on prn home o2 and nocturnal bipap, type 2 Dm  presents to ED with leg pain x 1 week , found to have CT findings c/w abscess in left femur     copd rx regimen - inhalers - prn o2 support  jacy - NIPPV  keep sat > 88 pct  Bone infection eval - OM - ct imaging reviewed -   ID eval   monitor VS and HD and Sat  cad - htn - cvs rx regimen and BP control  off AC -   on Anti Platelet rx regimen  pain assessment  GOC discussion  Dr Dejesus will take over care tomorrow  supportive medical regimen in progress  work up for acute infectious etiol in progress  old records reviewed

## 2021-12-21 NOTE — PROGRESS NOTE ADULT - SUBJECTIVE AND OBJECTIVE BOX
YUE     PLV 1EAS 103 D1    Allergies    No Known Allergies    Intolerances    shellfish (Nausea)      PAST MEDICAL & SURGICAL HISTORY:  Diabetes Mellitus, Type II    CAD (Coronary Artery Disease)  s/p 3v CABG ; [facility]    Dyslipidemia    Osteomyelitis    COPD (chronic obstructive pulmonary disease)  on 2L at home and BiPAP at night; intubated     Hypertension    PVD (peripheral vascular disease)    History of PAT (paroxysmal atrial tachycardia)    CABG (Coronary Artery Bypass Graft)      Compound fracture  left leg    S/P primary angioplasty with coronary stent        FAMILY HISTORY:  Family history of diabetes mellitus (Sibling)    Family hx of lung cancer  brother,  age 82, used to smoke with pt        Home Medications:  azithromycin 250 mg oral tablet: 1 tab(s) orally Monday, Wednesday, and Friday (18 Dec 2021 01:56)  Breo Ellipta 200 mcg-25 mcg/inh inhalation powder: 1 puff(s) inhaled once a day (18 Dec 2021 01:56)  Incruse Ellipta 62.5 mcg/inh inhalation powder: 1 puff(s) inhaled every 24 hours (18 Dec 2021 01:56)  Jardiance 25 mg oral tablet: 1 tab(s) orally once a day (in the morning) (18 Dec 2021 01:56)  losartan 25 mg oral tablet: 1 tab(s) orally once a day (18 Dec 2021 01:56)  metFORMIN 1000 mg oral tablet: 1 tab(s) orally 2 times a day w/food  please resume the dose on 08/10/2021  (18 Dec 2021 01:56)  NovoLOG FlexPen 100 units/mL injectable solution: Inject 5 units at BS over 200, 10 units at BS over 300, and 15 units at BS over 400 (18 Dec 2021 01:56)  Nucala 100 mg subcutaneous injection: 100 milligram(s) subcutaneous every 4 weeks    *Last given 21* (18 Dec 2021 01:56)  predniSONE 2 mg oral delayed release tablet: 2 tab(s) orally once a day (18 Dec 2021 01:56)  rosuvastatin 20 mg oral tablet: 1 tab(s) orally once a day (at bedtime) (18 Dec 2021 01:56)  tamsulosin 0.4 mg oral capsule: 1 cap(s) orally once a day (at bedtime) (18 Dec 2021 01:56)      MEDICATIONS  (STANDING):  aspirin enteric coated 81 milliGRAM(s) Oral daily  atorvastatin 80 milliGRAM(s) Oral at bedtime  azithromycin   Tablet 250 milliGRAM(s) Oral <User Schedule>  budesonide 160 MICROgram(s)/formoterol 4.5 MICROgram(s) Inhaler 2 Puff(s) Inhalation two times a day  cefepime   IVPB 2000 milliGRAM(s) IV Intermittent every 12 hours  dextrose 40% Gel 15 Gram(s) Oral once  dextrose 5%. 1000 milliLiter(s) (50 mL/Hr) IV Continuous <Continuous>  dextrose 5%. 1000 milliLiter(s) (100 mL/Hr) IV Continuous <Continuous>  dextrose 50% Injectable 25 Gram(s) IV Push once  dextrose 50% Injectable 12.5 Gram(s) IV Push once  dextrose 50% Injectable 25 Gram(s) IV Push once  glucagon  Injectable 1 milliGRAM(s) IntraMuscular once  insulin lispro (ADMELOG) corrective regimen sliding scale   SubCutaneous three times a day before meals  insulin lispro (ADMELOG) corrective regimen sliding scale   SubCutaneous at bedtime  metoprolol tartrate 50 milliGRAM(s) Oral two times a day  predniSONE   Tablet 4 milliGRAM(s) Oral daily  sodium chloride 0.9%. 1000 milliLiter(s) (60 mL/Hr) IV Continuous <Continuous>  tamsulosin 0.4 milliGRAM(s) Oral at bedtime  tiotropium 18 MICROgram(s) Capsule 1 Capsule(s) Inhalation daily  vancomycin  IVPB 1500 milliGRAM(s) IV Intermittent every 24 hours    MEDICATIONS  (PRN):  acetaminophen     Tablet .. 650 milliGRAM(s) Oral every 6 hours PRN Mild Pain (1 - 3), Moderate Pain (4 - 6)  melatonin 5 milliGRAM(s) Oral at bedtime PRN Insomnia      Diet, Consistent Carbohydrate w/Evening Snack:   DASH/TLC Sodium & Cholesterol Restricted (21 @ 17:43) [Active]          Vital Signs Last 24 Hrs  T(C): 36.8 (21 Dec 2021 05:26), Max: 36.8 (20 Dec 2021 14:05)  T(F): 98.2 (21 Dec 2021 05:26), Max: 98.2 (20 Dec 2021 14:05)  HR: 101 (21 Dec 2021 05:26) (88 - 101)  BP: 138/85 (21 Dec 2021 05:26) (117/70 - 138/85)  BP(mean): --  RR: 17 (21 Dec 2021 05:26) (17 - 17)  SpO2: 93% (21 Dec 2021 05:26) (92% - 96%)      21 @ 07:01  -  21 @ 07:00  --------------------------------------------------------  IN: 50 mL / OUT: 0 mL / NET: 50 mL              LABS:                        12.1   6.98  )-----------( 325      ( 21 Dec 2021 06:47 )             38.8         144  |  108  |  23  ----------------------------<  186<H>  4.3   |  30  |  1.40<H>    Ca    9.6      21 Dec 2021 06:47  Phos  3.1       Mg     2.1         TPro  7.0  /  Alb  2.6<L>  /  TBili  0.3  /  DBili  x   /  AST  11<L>  /  ALT  11<L>  /  AlkPhos  91                WBC:  WBC Count: 6.98 K/uL ( @ 06:47)  WBC Count: 6.53 K/uL ( @ 05:37)  WBC Count: 7.67 K/uL ( @ 09:06)  WBC Count: 7.49 K/uL ( @ 04:53)  WBC Count: 8.95 K/uL ( @ 17:43)      MICROBIOLOGY:  RECENT CULTURES:   .Blood Blood XXXX XXXX   No growth to date.                    Sodium:  Sodium, Serum: 144 mmol/L ( @ 06:47)  Sodium, Serum: 143 mmol/L ( @ 05:37)  Sodium, Serum: 142 mmol/L ( @ 09:06)  Sodium, Serum: 139 mmol/L ( @ 13:41)  Sodium, Serum: 138 mmol/L ( @ 17:43)      1.40 mg/dL  @ 06:47  1.40 mg/dL  @ 05:37  1.40 mg/dL  @ 09:06  1.50 mg/dL  @ 13:41  1.60 mg/dL  @ 17:43      Hemoglobin:  Hemoglobin: 12.1 g/dL ( @ :47)  Hemoglobin: 11.7 g/dL ( @ 05:37)  Hemoglobin: 11.6 g/dL ( @ 09:06)  Hemoglobin: 11.1 g/dL ( @ 04:53)  Hemoglobin: 12.8 g/dL ( @ :43)      Platelets: Platelet Count - Automated: 325 K/uL ( @ 06:47)  Platelet Count - Automated: 335 K/uL ( @ 05:37)  Platelet Count - Automated: 334 K/uL ( @ 09:06)  Platelet Count - Automated: 334 K/uL ( @ 04:53)  Platelet Count - Automated: 352 K/uL ( @ 17:43)      LIVER FUNCTIONS - ( 20 Dec 2021 05:37 )  Alb: 2.6 g/dL / Pro: 7.0 g/dL / ALK PHOS: 91 U/L / ALT: 11 U/L / AST: 11 U/L / GGT: x                 RADIOLOGY & ADDITIONAL STUDIES:      MICROBIOLOGY:  RECENT CULTURES:   .Blood Blood XXXX XXXX   No growth to date.

## 2021-12-21 NOTE — PROGRESS NOTE ADULT - ATTENDING COMMENTS
80yo male with pmhx CAD s/p 3v CABG 2004, stents in 2019, HLD, HTN, PAT(on Eliquis), PVD(s/p stents in b/l legs -- right leg in 2020), osteomyelitis, COPD on prn home o2 and nocturnal bipap, type 2 Dm  presents to ED with leg pain x 1 week , found to have CT findings c/w abscess in left femur now with concern for osteo Plan: picc placement planning, cont iv antibx, apprec ID recs, monitor clinical course, awaiting IR drainage, f/u cx, appre ortho recs, activity now ttwb on L

## 2021-12-21 NOTE — PROGRESS NOTE ADULT - SUBJECTIVE AND OBJECTIVE BOX
Patient is a 81y old  Male who presents with a chief complaint of osteo (20 Dec 2021 10:41)    Patient seen in follow up for MERRILL.        PAST MEDICAL HISTORY:  Diabetes Mellitus, Type II    CAD (Coronary Artery Disease)    Dyslipidemia    Asymptomatic Peripheral Vascular Disease    Osteomyelitis    COPD (chronic obstructive pulmonary disease)    Diabetes mellitus    Hypertension    PVD (peripheral vascular disease)    History of PAT (paroxysmal atrial tachycardia)      MEDICATIONS  (STANDING):  aspirin enteric coated 81 milliGRAM(s) Oral daily  atorvastatin 80 milliGRAM(s) Oral at bedtime  azithromycin   Tablet 250 milliGRAM(s) Oral <User Schedule>  budesonide 160 MICROgram(s)/formoterol 4.5 MICROgram(s) Inhaler 2 Puff(s) Inhalation two times a day  cefepime   IVPB 2000 milliGRAM(s) IV Intermittent every 12 hours  dextrose 40% Gel 15 Gram(s) Oral once  dextrose 5%. 1000 milliLiter(s) (50 mL/Hr) IV Continuous <Continuous>  dextrose 5%. 1000 milliLiter(s) (100 mL/Hr) IV Continuous <Continuous>  dextrose 50% Injectable 25 Gram(s) IV Push once  dextrose 50% Injectable 12.5 Gram(s) IV Push once  dextrose 50% Injectable 25 Gram(s) IV Push once  glucagon  Injectable 1 milliGRAM(s) IntraMuscular once  insulin lispro (ADMELOG) corrective regimen sliding scale   SubCutaneous three times a day before meals  insulin lispro (ADMELOG) corrective regimen sliding scale   SubCutaneous at bedtime  metoprolol tartrate 50 milliGRAM(s) Oral two times a day  predniSONE   Tablet 4 milliGRAM(s) Oral daily  sodium chloride 0.9%. 1000 milliLiter(s) (60 mL/Hr) IV Continuous <Continuous>  tamsulosin 0.4 milliGRAM(s) Oral at bedtime  tiotropium 18 MICROgram(s) Capsule 1 Capsule(s) Inhalation daily  vancomycin  IVPB 1500 milliGRAM(s) IV Intermittent every 24 hours    MEDICATIONS  (PRN):  acetaminophen     Tablet .. 650 milliGRAM(s) Oral every 6 hours PRN Mild Pain (1 - 3), Moderate Pain (4 - 6)  melatonin 5 milliGRAM(s) Oral at bedtime PRN Insomnia    T(C): 36.8 (12-21-21 @ 05:26), Max: 37.1 (12-19-21 @ 21:15)  HR: 101 (12-21-21 @ 05:26) (71 - 101)  BP: 138/85 (12-21-21 @ 05:26) (105/67 - 138/85)  RR: 17 (12-21-21 @ 05:26)  SpO2: 93% (12-21-21 @ 05:26)  Wt(kg): --  I&O's Detail    20 Dec 2021 07:01  -  21 Dec 2021 07:00  --------------------------------------------------------  IN:    IV PiggyBack: 50 mL  Total IN: 50 mL    OUT:  Total OUT: 0 mL    Total NET: 50 mL              PHYSICAL EXAM:  General: No distress  Respiratory: b/l air entry  Cardiovascular: S1 S2  Gastrointestinal: soft  Extremities:  no edema                           LABORATORY:                        12.1   6.98  )-----------( 325      ( 21 Dec 2021 06:47 )             38.8     12-21    144  |  108  |  23  ----------------------------<  186<H>  4.3   |  30  |  1.40<H>    Ca    9.6      21 Dec 2021 06:47  Phos  3.1     12-21  Mg     2.1     12-21    TPro  7.0  /  Alb  2.6<L>  /  TBili  0.3  /  DBili  x   /  AST  11<L>  /  ALT  11<L>  /  AlkPhos  91  12-20    Sodium, Serum: 144 mmol/L (12-21 @ 06:47)  Sodium, Serum: 143 mmol/L (12-20 @ 05:37)    Potassium, Serum: 4.3 mmol/L (12-21 @ 06:47)  Potassium, Serum: 4.0 mmol/L (12-20 @ 05:37)    Hemoglobin: 12.1 g/dL (12-21 @ 06:47)  Hemoglobin: 11.7 g/dL (12-20 @ 05:37)  Hemoglobin: 11.6 g/dL (12-19 @ 09:06)    Creatinine, Serum 1.40 (12-21 @ 06:47)  Creatinine, Serum 1.40 (12-20 @ 05:37)  Creatinine, Serum 1.40 (12-19 @ 09:06)  Creatinine, Serum 1.50 (12-18 @ 13:41)        LIVER FUNCTIONS - ( 20 Dec 2021 05:37 )  Alb: 2.6 g/dL / Pro: 7.0 g/dL / ALK PHOS: 91 U/L / ALT: 11 U/L / AST: 11 U/L / GGT: x

## 2021-12-21 NOTE — PROGRESS NOTE ADULT - SUBJECTIVE AND OBJECTIVE BOX
Patient is a 81y old  Male who presents with a chief complaint of osteomyelitis (21 Dec 2021 09:24)      INTERVAL HPI/OVERNIGHT EVENTS: No overnight events. Patient seen and examined at bedside. Patient resting comfortably in bed, feeling well. Has complaint of neck pain which he attributes to BiPAP mask. Denies fevers, chills, headache, lightheadedness, chest pain, dyspnea, abdominal pain, nausea, vomiting, diarrhea, constipation.      MEDICATIONS  (STANDING):  aspirin enteric coated 81 milliGRAM(s) Oral daily  atorvastatin 80 milliGRAM(s) Oral at bedtime  azithromycin   Tablet 250 milliGRAM(s) Oral <User Schedule>  budesonide 160 MICROgram(s)/formoterol 4.5 MICROgram(s) Inhaler 2 Puff(s) Inhalation two times a day  cefepime   IVPB 2000 milliGRAM(s) IV Intermittent every 12 hours  dextrose 40% Gel 15 Gram(s) Oral once  dextrose 5%. 1000 milliLiter(s) (50 mL/Hr) IV Continuous <Continuous>  dextrose 5%. 1000 milliLiter(s) (100 mL/Hr) IV Continuous <Continuous>  dextrose 50% Injectable 25 Gram(s) IV Push once  dextrose 50% Injectable 12.5 Gram(s) IV Push once  dextrose 50% Injectable 25 Gram(s) IV Push once  glucagon  Injectable 1 milliGRAM(s) IntraMuscular once  insulin lispro (ADMELOG) corrective regimen sliding scale   SubCutaneous three times a day before meals  insulin lispro (ADMELOG) corrective regimen sliding scale   SubCutaneous at bedtime  metoprolol tartrate 50 milliGRAM(s) Oral two times a day  predniSONE   Tablet 4 milliGRAM(s) Oral daily  sodium chloride 0.9%. 1000 milliLiter(s) (60 mL/Hr) IV Continuous <Continuous>  tamsulosin 0.4 milliGRAM(s) Oral at bedtime  tiotropium 18 MICROgram(s) Capsule 1 Capsule(s) Inhalation daily  vancomycin  IVPB 1500 milliGRAM(s) IV Intermittent every 24 hours    MEDICATIONS  (PRN):  acetaminophen     Tablet .. 650 milliGRAM(s) Oral every 6 hours PRN Mild Pain (1 - 3), Moderate Pain (4 - 6)  melatonin 5 milliGRAM(s) Oral at bedtime PRN Insomnia      Allergies    No Known Allergies    Intolerances    shellfish (Nausea)      REVIEW OF SYSTEMS:  CONSTITUTIONAL: No fever or chills  HEENT:  No headache, no sore throat  RESPIRATORY: No cough, wheezing, or shortness of breath  CARDIOVASCULAR: No chest pain, palpitations  GASTROINTESTINAL: No abd pain, nausea, vomiting, or diarrhea  GENITOURINARY: No dysuria, frequency, or hematuria  NEUROLOGICAL: no focal weakness or dizziness  MUSCULOSKELETAL: +neck pain     Vital Signs Last 24 Hrs  T(C): 36.8 (21 Dec 2021 05:26), Max: 36.8 (20 Dec 2021 14:05)  T(F): 98.2 (21 Dec 2021 05:26), Max: 98.2 (20 Dec 2021 14:05)  HR: 101 (21 Dec 2021 05:26) (88 - 101)  BP: 138/85 (21 Dec 2021 05:26) (117/70 - 138/85)  BP(mean): --  RR: 17 (21 Dec 2021 05:26) (17 - 17)  SpO2: 93% (21 Dec 2021 05:26) (92% - 96%)    PHYSICAL EXAM:  GENERAL: not in acute distress at rest   HEENT:  anicteric, moist mucous membranes  CHEST/LUNG:  CTA b/l, no rales, wheezes, or rhonchi  HEART:  RRR, S1, S2  ABDOMEN:  BS+, soft, nontender, nondistended  EXTREMITIES: no edema, cyanosis, or calf tenderness; Vertical scar on L lateral thigh, no TTP  NERVOUS SYSTEM: answers questions and follows commands appropriately    LABS:                        12.1   6.98  )-----------( 325      ( 21 Dec 2021 06:47 )             38.8     CBC Full  -  ( 21 Dec 2021 06:47 )  WBC Count : 6.98 K/uL  Hemoglobin : 12.1 g/dL  Hematocrit : 38.8 %  Platelet Count - Automated : 325 K/uL  Mean Cell Volume : 86.2 fl  Mean Cell Hemoglobin : 26.9 pg  Mean Cell Hemoglobin Concentration : 31.2 gm/dL  Auto Neutrophil # : x  Auto Lymphocyte # : x  Auto Monocyte # : x  Auto Eosinophil # : x  Auto Basophil # : x  Auto Neutrophil % : x  Auto Lymphocyte % : x  Auto Monocyte % : x  Auto Eosinophil % : x  Auto Basophil % : x    21 Dec 2021 06:47    144    |  108    |  23     ----------------------------<  186    4.3     |  30     |  1.40     Ca    9.6        21 Dec 2021 06:47  Phos  3.1       21 Dec 2021 06:47  Mg     2.1       21 Dec 2021 06:47          CAPILLARY BLOOD GLUCOSE      POCT Blood Glucose.: 269 mg/dL (21 Dec 2021 11:40)  POCT Blood Glucose.: 208 mg/dL (21 Dec 2021 08:39)  POCT Blood Glucose.: 304 mg/dL (21 Dec 2021 07:32)  POCT Blood Glucose.: 249 mg/dL (20 Dec 2021 21:55)  POCT Blood Glucose.: 129 mg/dL (20 Dec 2021 16:56)        Culture - Blood (collected 12-17-21 @ 21:22)  Source: .Blood Blood  Preliminary Report (12-18-21 @ 22:01):    No growth to date.    Culture - Blood (collected 12-17-21 @ 21:22)  Source: .Blood Blood  Preliminary Report (12-18-21 @ 22:01):    No growth to date.        RADIOLOGY & ADDITIONAL TESTS:    Personally reviewed.     Consultant(s) Notes Reviewed:  [x] YES  [ ] NO

## 2021-12-21 NOTE — CONSULT NOTE ADULT - SUBJECTIVE AND OBJECTIVE BOX
All records reviewed.  hx from chart, pt and wife    HPI:  82yo man with pmhx CAD s/p 3v CABG 2004, stents in 2019, HLD, HTN, PAT(on Eliquis), PVD(s/p stents in b/l legs -- right leg in ), osteomyelitis, COPD on prn home o2 and nocturnal bipap, type 2 Dm who presented to ED with increasing left leg pain x 1 week. Hx left leg/thigh fx/multiple surg interventions since 50years ago.  Adm w incr left thigh pain.     CT femur , LLE duplex and covid pcr were obtained. significant for bun/cr 26/1.6, glucose 162, album 2.9, ca 10.2, lactate 1.9. covid pcr negative. US No evidence of left lower extremity deep venous thrombosis. CT revealed Old healed deformity of the mid left femur, Complex heterogeneous lobulated soft tissue anterior and lateral to the middle two thirds of the left femur   concerning for an abscess. New lobulated intramedullary defect with tract   extending to the cortical surface in the mid left femur concerning for a   possible intramedullary abscess.   For planned IR drain of the left femur abscess.  Plavix stopped since yesterday    No fam hx bleeding dyscrsisa/clots  Has had multiple surgeries in past without complications    PAST MEDICAL & SURGICAL HISTORY:  Diabetes Mellitus, Type II    CAD (Coronary Artery Disease)  s/p 3v CABG ; stents placed in [place] in     Dyslipidemia    Osteomyelitis    COPD (chronic obstructive pulmonary disease)  on 2L at home and BiPAP at night; intubated     Hypertension    PVD (peripheral vascular disease)    History of PAT (paroxysmal atrial tachycardia)    CABG (Coronary Artery Bypass Graft)      Compound fracture  left leg    S/P primary angioplasty with coronary stent        Review of System:    MEDICATIONS  (STANDING):  aspirin enteric coated 81 milliGRAM(s) Oral daily  atorvastatin 80 milliGRAM(s) Oral at bedtime  azithromycin   Tablet 250 milliGRAM(s) Oral <User Schedule>  budesonide 160 MICROgram(s)/formoterol 4.5 MICROgram(s) Inhaler 2 Puff(s) Inhalation two times a day  cefepime   IVPB 2000 milliGRAM(s) IV Intermittent every 12 hours  dextrose 40% Gel 15 Gram(s) Oral once  dextrose 5%. 1000 milliLiter(s) (50 mL/Hr) IV Continuous <Continuous>  dextrose 5%. 1000 milliLiter(s) (100 mL/Hr) IV Continuous <Continuous>  dextrose 50% Injectable 25 Gram(s) IV Push once  dextrose 50% Injectable 12.5 Gram(s) IV Push once  dextrose 50% Injectable 25 Gram(s) IV Push once  glucagon  Injectable 1 milliGRAM(s) IntraMuscular once  insulin lispro (ADMELOG) corrective regimen sliding scale   SubCutaneous three times a day before meals  insulin lispro (ADMELOG) corrective regimen sliding scale   SubCutaneous at bedtime  metoprolol tartrate 50 milliGRAM(s) Oral two times a day  predniSONE   Tablet 4 milliGRAM(s) Oral daily  sodium chloride 0.9%. 1000 milliLiter(s) (60 mL/Hr) IV Continuous <Continuous>  tamsulosin 0.4 milliGRAM(s) Oral at bedtime  tiotropium 18 MICROgram(s) Capsule 1 Capsule(s) Inhalation daily  vancomycin  IVPB 1500 milliGRAM(s) IV Intermittent every 24 hours    MEDICATIONS  (PRN):  acetaminophen     Tablet .. 650 milliGRAM(s) Oral every 6 hours PRN Mild Pain (1 - 3), Moderate Pain (4 - 6)  melatonin 5 milliGRAM(s) Oral at bedtime PRN Insomnia      Allergies    No Known Allergies    Intolerances    shellfish (Nausea)      SOCIAL HISTORY:  stopped tobacco 20-30yrs    FAMILY HISTORY:  Family history of diabetes mellitus (Sibling)    Family hx of lung cancer  brother,  age 82, used to smoke with pt    no blood dyscrasia        Vital Signs Last 24 Hrs  T(C): 36.9 (21 Dec 2021 12:42), Max: 36.9 (21 Dec 2021 12:42)  T(F): 98.5 (21 Dec 2021 12:42), Max: 98.5 (21 Dec 2021 12:42)  HR: 86 (21 Dec 2021 12:42) (86 - 101)  BP: 119/72 (21 Dec 2021 12:42) (117/70 - 138/85)  BP(mean): --  RR: 18 (21 Dec 2021 12:42) (17 - 18)  SpO2: 93% (21 Dec 2021 12:42) (92% - 96%)    PHYSICAL EXAM:      General:well developed well nourished, in no acute distress  Eyes:sclera anicteric, pupils equal and EMOI  ENMT:buccal mucosa moist  Neck:supple, trachea midline  Lungs:clear, no wheeze/rhonchi  Cardiovascular:regular rate and rhythm, S1 S2  Abdomen:soft, nontender, no organomegaly present, bowel sounds normal  Neurological:alert and oriented x3, Cranial Nerves II-XII grossly intact  Skin:no increased ecchymosis/petechiae/purpura  Lymph Nodes:no palpable cervical/supraclavicular lymph nodes enlargements  Extremities:no cyanosis/clubbing/edema        LABS:                        12.1   6.98  )-----------( 325      ( 21 Dec 2021 06:47 )             38.8      @ 06:47  WBC6.98  RBC4.50 Hgb12.1 Hct38.8  MCV86.2  [id number]  Auto Neutro-- Band-- Auto Lymph-- Atypical Lymph-- Reactive Lymph-- Auto Mono-- Auto Eos-- Auto Baso--         @ 05:37  WBC6.53  RBC4.30 Hgb11.7 Hct37.8  MCV87.9  [id number]  Auto Neutro-- Band-- Auto Lymph-- Atypical Lymph-- Reactive Lymph-- Auto Mono-- Auto Eos-- Auto Baso--         @ 09:06  WBC7.67  RBC4.29 Hgb11.6 Hct37.5  MCV87.4  [id number]  Auto Neutro-- Band-- Auto Lymph-- Atypical Lymph-- Reactive Lymph-- Auto Mono-- Auto Eos-- Auto Baso--         @ 04:53  WBC7.49  RBC4.06 Hgb11.1 Hct35.1  MCV86.5  [id number]  Auto Prdfjx65.1 Band-- Auto Lymph16.7 Atypical Lymph-- Reactive Lymph-- Auto Mono11.7 Auto Eos0.5 Auto Baso0.3         @ 17:43  WBC8.95  RBC4.73 Hgb12.8 Hct41.1  MCV86.9  [id number]  Auto Epjfok44.6 Band-- Auto Lymph11.3 Atypical Lymph-- Reactive Lymph-- Auto Mono8.3 Auto Eos0.0 Auto Baso0.2              144  |  108  |  23  ----------------------------<  186<H>  4.3   |  30  |  1.40<H>    Ca    9.6      21 Dec 2021 06:47  Phos  3.1       Mg     2.1         TPro  7.0  /  Alb  2.6<L>  /  TBili  0.3  /  DBili  x   /  AST  11<L>  /  ALT  11<L>  /  AlkPhos  91   @ 04:53  PT13.4 INR1.15  PTT36.9   @ 17:43  PT14.8 INR1.28  PTT45.4        PERIPHERAL BLOOD SMEAR REVIEW:      RADIOLOGY & ADDITIONAL STUDIES:  < from: MR Femur w/wo IV Cont, Left (21 @ 12:38) >    ACC: [id number] EXAM:  MR FEMUR WAW IC LT                          PROCEDURE DATE:  2021          INTERPRETATION:  History: Chronic osteomyelitis    Technique: Multiplanar and multi-sequence MRI images were obtained of the   left femur without and with contrast. Approximately 10 cc intravenous   Gadavist was administered.    Comparison: Comparison CT dated 2021.    Findings:    Please note that the distal femur is off the field of view.    There is a multiloculated rim-enhancing abscess along the lateral and   anterior aspect of the mid to distal femur, which measures approximately   25 cm proximal to distal, with thick rim of peripheral enhancement. The   transaxial dimension is difficult to measure, but measures up to 7.2 x   4.7 cm transaxially.    There is a chronic appearing posttraumatic deformity of the mid to distal   femur with areas of osseous edema and T1 marrow signal abnormality   subjacent to the abscess, likely related to osteomyelitis. There is a   possible multiloculated abscess within the mid to proximal femoral   diaphysis near the superior portion of the abscess with findings   concerning for a sequestrum an involucrum when correlated with recent CT.   There is likely a cloaca leading from this probableabscess to the   anterolateral soft tissues. This area of possible intraosseous abscess is   difficult to measure but measures approximately 1.7 x 2.6 cm transaxially.    Subcutaneous edema with enhancement along the lateral and anterolateral   mid to distal thigh, likely representing cellulitis.    Neurovascular structures are unremarkable.    Impression:    Multiloculated rim-enhancing abscess along the anterior and lateral   aspect of the mid to distal femur. Given the thick rim of enhancement,   recommend follow-up MRI with and without contrast after therapy to rule   out an underlying mass/neoplasm.    Posttraumatic deformity of the mid to distal femur with findings most   compatible with chronic osteomyelitis of the femur subjacent to the   abscess.    Probable intraosseous abscess within the mid to proximal diaphysis of the   femur near the superior aspect of the abscess with probable areas of   sequestrum, involucrum, and cloaca formation when correlated with recent   CT.      < end of copied text >  < from: CT Femur No Cont, Left (21 @ 22:24) >    ACC: [id number] EXAM:  CT FEMUR ONLY LT                          PROCEDURE DATE:  2021          INTERPRETATION:  CLINICAL INFORMATION: Left leg pain. History of fracture   and osteomyelitis.    TECHNIQUE: CT of the left femur was performed in bone and soft tissue   windows with coronal and sagittal reformats. No intravenous contrast was   administered.    COMPARISON: CT of bilateral femurs from 2010.    FINDINGS:    Bone: An old healed deformity of the left mid femur is not significantly   changed. An old left femoral intramedullary dominick tract is noted. No acute   fracture or dislocation is demonstrated. Heterotopic ossification   superior to the left greater trochanter is not significantly changed. A   new lobulated 2.2 x 1.8 cm intramedullary defect with tract to the   lateral cortical surface is seen in the mid femur. Degenerative changes   of the left knee is noted.    Soft tissues: Complex heterogeneous lobulated soft tissue measuring 6.9 x   6.9 x 18.3 cm is seen anterior and lateral to the femur involving the   middle two thirds of the femur with mild adjacent inflammatory change.   The vastus intermedialis and lateralis musculature is markedly atrophic.   Vascular calcifications are noted. Bilateral external iliac artery and   left mid to distal femoral artery stents are seen.    IMPRESSION:    Old healed deformity of the mid left femur likely correlating with the   history of a prior osteomyelitis. Complex heterogeneous lobulated soft   tissue anterior and lateral to the middle two thirds of the left femur   concerning for an abscess. New lobulated intramedullary defect with tract   extending to the cortical surface in the mid left femur concerning for a   possible intramedullary abscess. A contrast-enhancedMRI of the left   femur is recommended for further evaluation.      < end of copied text >

## 2021-12-21 NOTE — PROGRESS NOTE ADULT - PROBLEM SELECTOR PLAN 1
Intramedullary Abscess   - pt with h/o compound fracture/osteomyelitis of left femur. now p/w left leg pain.   - CT revealed Old healed deformity of the mid left femur , Complex heterogeneous lobulated soft  tissue anterior and lateral to the middle two thirds of the left femur concerning for an abscess. New lobulated intramedullary defect with tract extending to the cortical surface in the mid left femur concerning for a possible intramedullary abscess.   - MRI w/w/o IV con: Multiloculated rim-enhancing abscess along the anterior and lateral aspect of the mid to distal femur. Given the thick rim of enhancement, recommend follow-up MRI with and without contrast after therapy to rule out an underlying mass/neoplasm. Probable intraosseous abscess within the mid to proximal diaphysis of the femur near the superior aspect of the abscess with probable areas of sequestrum, involucrum, and cloaca formation when correlated with recent CT. Findings also concerning for adjacent chronic osteo.   - Afebrile, no leukocytosis. monitor for fever. trend daily cbc with diff   - tylenol for pain  - BCx x2 NGTD  - Doppler negative for DVT  - Continue cefepime, vanc   - ID ([place]), Ortho consulted, recs appreciated   - Nephro consulted for recommendations on using contrast with patient in [facility]  - D/w IR - plan for drain placement once patient has been off Plavix for 5 days. Eliquis on hold since 12/17, Plavix on hold since 12/20. Continue with ASA 81mg daily   - Patient will need PICC line for long term IV abx treatment

## 2021-12-21 NOTE — PROGRESS NOTE ADULT - PROBLEM SELECTOR PLAN 9
- PVD s/p stents in b/l legs -- right leg in 2020  - Hold Plavix and continue ASA 81 mg daily in setting of planned procedure

## 2021-12-21 NOTE — PROGRESS NOTE ADULT - ASSESSMENT
The patient is an 82yo male with pmhx CAD s/p 3v CABG 2004, stents in 2019, HLD, HTN, PAT(on Eliquis), PVD(s/p stents in b/l legs -- right leg in 2020), osteomyelitis, COPD on prn home o2 and nocturnal bipap, type 2 Dm  presents to ED with leg pain x 1 week , found to have CT findings c/w abscess in left femur    The patient is an 80yo male with pmhx CAD s/p 3v CABG 2004, stents in 2019, HLD, HTN, PAT(on Eliquis), PVD(s/p stents in b/l legs -- right leg in 2020), osteomyelitis, COPD on prn home o2 and nocturnal bipap, type 2 Dm  presents to ED with leg pain x 1 week , found to have CT findings c/w abscess in left femur now with concern for osteo

## 2021-12-21 NOTE — PROGRESS NOTE ADULT - SUBJECTIVE AND OBJECTIVE BOX
[facility] -- Saud Aguilar, Génesis Louise, Carroll Haney Savella  Office # [phone]      Follow Up:    CAD HTN HLD  Subjective/Observations:     No events overnight resting comfortably in bed.  No complaints of chest pain, dyspnea, or palpitations reported. No signs of orthopnea or PND.    REVIEW OF SYSTEMS: All other review of systems is negative unless indicated above    PAST MEDICAL & SURGICAL HISTORY:  Diabetes Mellitus, Type II    CAD (Coronary Artery Disease)  s/p 3v CABG 2004; stents placed in [place] in 2019    Dyslipidemia    Osteomyelitis    COPD (chronic obstructive pulmonary disease)  on 2L at home and BiPAP at night; intubated 6/18    Hypertension    PVD (peripheral vascular disease)    History of PAT (paroxysmal atrial tachycardia)    CABG (Coronary Artery Bypass Graft)  2004    Compound fracture  left leg    S/P primary angioplasty with coronary stent        MEDICATIONS  (STANDING):  acetaminophen     Tablet .. 1000 milliGRAM(s) Oral once  aspirin enteric coated 81 milliGRAM(s) Oral daily  atorvastatin 80 milliGRAM(s) Oral at bedtime  azithromycin   Tablet 250 milliGRAM(s) Oral <User Schedule>  budesonide 160 MICROgram(s)/formoterol 4.5 MICROgram(s) Inhaler 2 Puff(s) Inhalation two times a day  cefepime   IVPB 2000 milliGRAM(s) IV Intermittent every 12 hours  dextrose 40% Gel 15 Gram(s) Oral once  dextrose 5%. 1000 milliLiter(s) (50 mL/Hr) IV Continuous <Continuous>  dextrose 5%. 1000 milliLiter(s) (100 mL/Hr) IV Continuous <Continuous>  dextrose 50% Injectable 25 Gram(s) IV Push once  dextrose 50% Injectable 12.5 Gram(s) IV Push once  dextrose 50% Injectable 25 Gram(s) IV Push once  glucagon  Injectable 1 milliGRAM(s) IntraMuscular once  insulin lispro (ADMELOG) corrective regimen sliding scale   SubCutaneous three times a day before meals  insulin lispro (ADMELOG) corrective regimen sliding scale   SubCutaneous at bedtime  metoprolol tartrate 50 milliGRAM(s) Oral two times a day  predniSONE   Tablet 4 milliGRAM(s) Oral daily  sodium chloride 0.9%. 1000 milliLiter(s) (60 mL/Hr) IV Continuous <Continuous>  tamsulosin 0.4 milliGRAM(s) Oral at bedtime  tiotropium 18 MICROgram(s) Capsule 1 Capsule(s) Inhalation daily  vancomycin  IVPB 1500 milliGRAM(s) IV Intermittent every 24 hours    MEDICATIONS  (PRN):  acetaminophen     Tablet .. 650 milliGRAM(s) Oral every 6 hours PRN Mild Pain (1 - 3), Moderate Pain (4 - 6)  melatonin 5 milliGRAM(s) Oral at bedtime PRN Insomnia      Allergies    No Known Allergies    Intolerances    shellfish (Nausea)      Vital Signs Last 24 Hrs  T(C): 36.8 (21 Dec 2021 05:26), Max: 36.8 (20 Dec 2021 14:05)  T(F): 98.2 (21 Dec 2021 05:26), Max: 98.2 (20 Dec 2021 14:05)  HR: 101 (21 Dec 2021 05:26) (88 - 101)  BP: 138/85 (21 Dec 2021 05:26) (117/70 - 138/85)  BP(mean): --  RR: 17 (21 Dec 2021 05:26) (17 - 17)  SpO2: 93% (21 Dec 2021 05:26) (92% - 96%)    I&O's Summary    20 Dec 2021 07:01  -  21 Dec 2021 07:00  --------------------------------------------------------  IN: 50 mL / OUT: 0 mL / NET: 50 mL          PHYSICAL EXAM:  TELE: SR 3 beats NSVT  Constitutional: NAD, awake and alert, well-developed  HEENT: Moist Mucous Membranes, Anicteric  Pulmonary: Non-labored, breath sounds are clear bilaterally, No wheezing, crackles or rhonchi  Cardiovascular: Regular, S1 and S2 nl, No murmurs, rubs, gallops or clicks  Gastrointestinal: Bowel Sounds present, soft, nontender.   Lymph: No lymphadenopathy. No peripheral edema.  Skin: No visible rashes or ulcers.  Psych:  Mood & affect appropriate    LABS: All Labs Reviewed:                        12.1   6.98  )-----------( 325      ( 21 Dec 2021 06:47 )             38.8                         11.7   6.53  )-----------( 335      ( 20 Dec 2021 05:37 )             37.8                         11.6   7.67  )-----------( 334      ( 19 Dec 2021 09:06 )             37.5     21 Dec 2021 06:47    144    |  108    |  23     ----------------------------<  186    4.3     |  30     |  1.40   20 Dec 2021 05:37    143    |  107    |  23     ----------------------------<  161    4.0     |  32     |  1.40   19 Dec 2021 09:06    142    |  105    |  23     ----------------------------<  157    3.9     |  30     |  1.40     Ca    9.6        21 Dec 2021 06:47  Ca    9.9        20 Dec 2021 05:37  Ca    9.3        19 Dec 2021 09:06  Phos  3.1       21 Dec 2021 06:47  Mg     2.1       21 Dec 2021 06:47    TPro  7.0    /  Alb  2.6    /  TBili  0.3    /  DBili  x      /  AST  11     /  ALT  11     /  AlkPhos  91     20 Dec 2021 05:37  TPro  6.9    /  Alb  2.5    /  TBili  0.4    /  DBili  x      /  AST  11     /  ALT  9      /  AlkPhos  85     19 Dec 2021 09:06  TPro  7.1    /  Alb  2.5    /  TBili  0.5    /  DBili  x      /  AST  16     /  ALT  11     /  AlkPhos  90     18 Dec 2021 13:41      Chest 1 View AP/PA (12.17.21 @ 17:09) >  ACC: [id number] EXAM:  XR CHEST AP OR PA 1V                          PROCEDURE DATE:  12/17/2021          INTERPRETATION:  Evaluate for pneumonia    AP chest. Prior 9/3/2021.    Median sternotomy. Normal heart mediastinum. Hyperinflated lungs. No   consolidation or effusion.    IMPRESSION: Hyperinflated lungs. No active infiltrates    --- End of Report ---            RODRIGO FAITH MD; Attending Radiologist  This document has been electronically signed. Dec 18 2021  9:39AM    < end of copied text >  < from: US Duplex Venous Lower Ext Ltd, Left (12.17.21 @ 19:56) >  ACC: [id number] EXAM:  US DPLX LWR EXT VEINS LTD LT                          PROCEDURE DATE:  12/17/2021          INTERPRETATION:  CLINICAL INFORMATION: Left leg pain. Evaluate for DVT.    COMPARISON: 1/17/2020.    TECHNIQUE: Duplex sonography of theLEFT LOWER extremity veins with color   and spectral Doppler, with and without compression.    FINDINGS:    There is normal compressibility of the left common femoral, femoral and   popliteal veins.  The contralateral common femoral vein is patent.  Doppler examination shows normal spontaneous and phasic flow.    No calf vein thrombosis is detected.    IMPRESSION:  No evidence of left lower extremity deep venous thrombosis.          --- End of Report ---            ELEAZAR CASTAÑEDA MD; Attending Radiologist  This document has been electronically signed. Dec 17 2021  8:17PM    < end of copied text >  < from: CT Femur No Cont, Left (12.17.21 @ 22:24) >  ACC: [id number] EXAM:  CT FEMUR ONLY LT                          PROCEDURE DATE:  12/17/2021          INTERPRETATION:  CLINICAL INFORMATION: Left leg pain. History of fracture   and osteomyelitis.    TECHNIQUE: CT of the left femur was performed in bone and soft tissue   windows with coronal and sagittal reformats. No intravenous contrast was   administered.    COMPARISON: CT of bilateral femurs from 11/6/2010.    FINDINGS:    Bone: An old healed deformity of the left mid femur is not significantly   changed. An old left femoral intramedullary dominick tract is noted. No acute   fracture or dislocation is demonstrated. Heterotopic ossification   superior to the left greater trochanter is not significantly changed. A   new lobulated 2.2 x 1.8 cm intramedullary defect with tract to the   lateral cortical surface is seen in the mid femur. Degenerative changes   of the left knee is noted.    Soft tissues: Complex heterogeneous lobulated soft tissue measuring 6.9 x   6.9 x 18.3 cm is seen anterior and lateral to the femur involving the   middle two thirds of the femur with mild adjacent inflammatory change.   The vastus intermedialis and lateralis musculature is markedly atrophic.   Vascular calcifications are noted. Bilateral external iliac artery and   left mid to distal femoral artery stents are seen.    IMPRESSION:    Old healed deformity of the mid left femur likely correlating with the   history of a prior osteomyelitis. Complex heterogeneous lobulated soft   tissue anterior and lateral to the middle two thirds of the left femur   concerning for an abscess. New lobulated intramedullary defect with tract   extending to the cortical surface in the mid left femur concerning for a   possible intramedullary abscess. A contrast-enhancedMRI of the left   femur is recommended for further evaluation.    --- End of Report ---            PAZ CHUA MD; Attending Radiologist  This document has been electronically signed. Dec 17 2021 11:20PM    < end of copied text >    EKG:  < from: 12 Lead ECG (12.17.21 @ 16:27) >  Ventricular Rate 97 BPM    Atrial Rate 97 BPM    P-R Interval 114 ms    QRS Duration 80 ms    Q-T Interval 344 ms    QTC Calculation(Bazett) 436 ms    P Axis 59 degrees    R Axis 79 degrees    T Axis 57 degrees    Diagnosis Line Sinus rhythm with premature supraventricular complexes  Otherwise normal ECG  When compared with ECG of 03-SEP-2021 20:01,  premature supraventricular complexes are now present  Confirmed by Regla Figueroa ([id number]) on 12/18/2021 2:58:38 PM    < end of copied text >  < from: TTE with Doppler (w/Cont) (08.03.21 @ 06:53) >    Patient name: YUE COTTO  YOB: 1940   Age: 81 (M)   MR#: [id number]  Study Date: 8/3/2021  [facility]: Nena Canseco RDCS  Study quality: Technically difficult  Referring Physician: Cecelia Mendenhall MD  BloodPressure: 156/93 mmHg  Height: 180 cm  Weight: 113 kg  BSA: 2.3 m2  Heart Rate: 99 mmHg  ------------------------------------------------------------------------  PROCEDURE: Transthoracic echocardiogram with 2-D, M-Mode  and complete spectral and color flow Doppler. Verbal  consent was obtained for injection of  Ultrasonic Enhancing  Agent following a discussion of risks and benefits.  Following intravenous injection of Ultrasonic Enhancing  Agent , harmonic imaging was performed.  INDICATION: Abnormal electrocardiogram (ECG) (EKG) (R94.31)  ------------------------------------------------------------------------  Dimensions:    Normal Values:  LA:     3.4    2.0 - 4.0 cm  Ao:     3.6    2.0 - 3.8 cm  SEPTUM: 0.7    0.6 - 1.2 cm  PWT:    0.6  0.6 - 1.1 cm  LVIDd:  3.9    3.0 - 5.6 cm  LVIDs:  3.3    1.8 - 4.0 cm  Derived variables:  LVMI: 34 g/m2  RWT: 0.35  Fractional short: 14 %  EF (Visual Estimate): 45-50 %  Doppler Peak Velocity (m/sec): AoV=0.9  ------------------------------------------------------------------------  Observations:  Mitral Valve: Mitral valve not well visualized, probably  normal. Minimal mitral regurgitation.  Aortic Valve/Aorta: Calcified trileaflet aortic valve with  normal opening. Peak transaortic valve gradient equals 4 mm  Hg, estimated aortic valve area equals 3.1 sqcm. No aortic  valve regurgitation seen. Peak left ventricular outflow  tract gradient equals 3 mm Hg.  Aortic Root: 3.6 cm.  Left Atrium: Normal left atrium.  Left Ventricle: Mild segmental left ventricular systolic  dysfunction. The ejection fraction varies with the R-R  interval.  The apical inferior wall, the apical septum, the  apical lateral wall, the basal inferior wall, the mid  anterior wall, and the mid inferior wall are hypokinetic.  Septal motion consistent with cardiac surgery. Normal left  ventricular internal dimensions and wall thicknesses. Mild  diastolic dysfunction (Stage I).  Right Heart: Normal right atrium. Normal right ventricular  size and function. Normal tricuspid valve. Minimal  tricuspid regurgitation. Normal pulmonic valve. No pulmonic  regurgitation.  Pericardium/Pleura: Normal pericardium with no pericardial  effusion.  Hemodynamic: Color Doppler demonstrates no evidence of a  patent foramen ovale.  ------------------------------------------------------------------------  Conclusions:  1. Mitral valve not well visualized, probably normal.  Minimal mitral regurgitation.  2. Calcified trileaflet aortic valve with normal opening.  No aortic valve regurgitation seen.  3. Mild segmental left ventricular systolic dysfunction.  The ejection fraction varies with the R-R interval.  The  apical inferior wall, the apical septum, the apical lateral  wall, the basal inferior wall, the mid anterior wall, and  the mid inferior wall are hypokinetic.  Septal motion  consistent with cardiac surgery.  4. Mild diastolic dysfunction (Stage I).  5. Normal right ventricular size and function.

## 2021-12-22 DIAGNOSIS — M86.9 OSTEOMYELITIS, UNSPECIFIED: ICD-10-CM

## 2021-12-22 DIAGNOSIS — L02.91 CUTANEOUS ABSCESS, UNSPECIFIED: ICD-10-CM

## 2021-12-22 DIAGNOSIS — M86.60 OTHER CHRONIC OSTEOMYELITIS, UNSPECIFIED SITE: ICD-10-CM

## 2021-12-22 LAB
ANION GAP SERPL CALC-SCNC: 5 MMOL/L — SIGNIFICANT CHANGE UP (ref 5–17)
BASOPHILS # BLD AUTO: 0.02 K/UL — SIGNIFICANT CHANGE UP (ref 0–0.2)
BASOPHILS NFR BLD AUTO: 0.3 % — SIGNIFICANT CHANGE UP (ref 0–2)
BUN SERPL-MCNC: 25 MG/DL — HIGH (ref 7–23)
CALCIUM SERPL-MCNC: 10 MG/DL — SIGNIFICANT CHANGE UP (ref 8.5–10.1)
CHLORIDE SERPL-SCNC: 105 MMOL/L — SIGNIFICANT CHANGE UP (ref 96–108)
CO2 SERPL-SCNC: 31 MMOL/L — SIGNIFICANT CHANGE UP (ref 22–31)
CREAT SERPL-MCNC: 1.5 MG/DL — HIGH (ref 0.5–1.3)
CULTURE RESULTS: SIGNIFICANT CHANGE UP
CULTURE RESULTS: SIGNIFICANT CHANGE UP
EOSINOPHIL # BLD AUTO: 0.06 K/UL — SIGNIFICANT CHANGE UP (ref 0–0.5)
EOSINOPHIL NFR BLD AUTO: 0.9 % — SIGNIFICANT CHANGE UP (ref 0–6)
GLUCOSE SERPL-MCNC: 160 MG/DL — HIGH (ref 70–99)
HCT VFR BLD CALC: 38.5 % — LOW (ref 39–50)
HGB BLD-MCNC: 11.7 G/DL — LOW (ref 13–17)
IMM GRANULOCYTES NFR BLD AUTO: 0.7 % — SIGNIFICANT CHANGE UP (ref 0–1.5)
LYMPHOCYTES # BLD AUTO: 1.38 K/UL — SIGNIFICANT CHANGE UP (ref 1–3.3)
LYMPHOCYTES # BLD AUTO: 19.8 % — SIGNIFICANT CHANGE UP (ref 13–44)
MCHC RBC-ENTMCNC: 26.2 PG — LOW (ref 27–34)
MCHC RBC-ENTMCNC: 30.4 GM/DL — LOW (ref 32–36)
MCV RBC AUTO: 86.3 FL — SIGNIFICANT CHANGE UP (ref 80–100)
MONOCYTES # BLD AUTO: 0.85 K/UL — SIGNIFICANT CHANGE UP (ref 0–0.9)
MONOCYTES NFR BLD AUTO: 12.2 % — SIGNIFICANT CHANGE UP (ref 2–14)
NEUTROPHILS # BLD AUTO: 4.61 K/UL — SIGNIFICANT CHANGE UP (ref 1.8–7.4)
NEUTROPHILS NFR BLD AUTO: 66.1 % — SIGNIFICANT CHANGE UP (ref 43–77)
NRBC # BLD: 0 /100 WBCS — SIGNIFICANT CHANGE UP (ref 0–0)
PLATELET # BLD AUTO: 340 K/UL — SIGNIFICANT CHANGE UP (ref 150–400)
POTASSIUM SERPL-MCNC: 3.8 MMOL/L — SIGNIFICANT CHANGE UP (ref 3.5–5.3)
POTASSIUM SERPL-SCNC: 3.8 MMOL/L — SIGNIFICANT CHANGE UP (ref 3.5–5.3)
RBC # BLD: 4.46 M/UL — SIGNIFICANT CHANGE UP (ref 4.2–5.8)
RBC # FLD: 12.9 % — SIGNIFICANT CHANGE UP (ref 10.3–14.5)
SODIUM SERPL-SCNC: 141 MMOL/L — SIGNIFICANT CHANGE UP (ref 135–145)
SPECIMEN SOURCE: SIGNIFICANT CHANGE UP
SPECIMEN SOURCE: SIGNIFICANT CHANGE UP
VANCOMYCIN TROUGH SERPL-MCNC: 15.9 UG/ML — SIGNIFICANT CHANGE UP (ref 10–20)
WBC # BLD: 6.97 K/UL — SIGNIFICANT CHANGE UP (ref 3.8–10.5)
WBC # FLD AUTO: 6.97 K/UL — SIGNIFICANT CHANGE UP (ref 3.8–10.5)

## 2021-12-22 PROCEDURE — 99232 SBSQ HOSP IP/OBS MODERATE 35: CPT

## 2021-12-22 PROCEDURE — 99233 SBSQ HOSP IP/OBS HIGH 50: CPT | Mod: GC

## 2021-12-22 RX ORDER — APIXABAN 2.5 MG/1
5 TABLET, FILM COATED ORAL EVERY 12 HOURS
Refills: 0 | Status: DISCONTINUED | OUTPATIENT
Start: 2021-12-22 | End: 2021-12-24

## 2021-12-22 RX ADMIN — Medication 300 MILLIGRAM(S): at 06:53

## 2021-12-22 RX ADMIN — Medication 4 MILLIGRAM(S): at 06:44

## 2021-12-22 RX ADMIN — Medication 650 MILLIGRAM(S): at 06:48

## 2021-12-22 RX ADMIN — ATORVASTATIN CALCIUM 80 MILLIGRAM(S): 80 TABLET, FILM COATED ORAL at 21:26

## 2021-12-22 RX ADMIN — BUDESONIDE AND FORMOTEROL FUMARATE DIHYDRATE 2 PUFF(S): 160; 4.5 AEROSOL RESPIRATORY (INHALATION) at 20:17

## 2021-12-22 RX ADMIN — APIXABAN 5 MILLIGRAM(S): 2.5 TABLET, FILM COATED ORAL at 20:38

## 2021-12-22 RX ADMIN — Medication 5 MILLIGRAM(S): at 21:29

## 2021-12-22 RX ADMIN — TAMSULOSIN HYDROCHLORIDE 0.4 MILLIGRAM(S): 0.4 CAPSULE ORAL at 21:26

## 2021-12-22 RX ADMIN — CEFEPIME 100 MILLIGRAM(S): 1 INJECTION, POWDER, FOR SOLUTION INTRAMUSCULAR; INTRAVENOUS at 20:02

## 2021-12-22 RX ADMIN — Medication 50 MILLIGRAM(S): at 20:02

## 2021-12-22 RX ADMIN — Medication 2: at 22:35

## 2021-12-22 RX ADMIN — CEFEPIME 100 MILLIGRAM(S): 1 INJECTION, POWDER, FOR SOLUTION INTRAMUSCULAR; INTRAVENOUS at 06:43

## 2021-12-22 RX ADMIN — TIOTROPIUM BROMIDE 1 CAPSULE(S): 18 CAPSULE ORAL; RESPIRATORY (INHALATION) at 06:50

## 2021-12-22 RX ADMIN — Medication 4: at 08:08

## 2021-12-22 RX ADMIN — AZITHROMYCIN 250 MILLIGRAM(S): 500 TABLET, FILM COATED ORAL at 06:44

## 2021-12-22 RX ADMIN — Medication 81 MILLIGRAM(S): at 12:55

## 2021-12-22 RX ADMIN — BUDESONIDE AND FORMOTEROL FUMARATE DIHYDRATE 2 PUFF(S): 160; 4.5 AEROSOL RESPIRATORY (INHALATION) at 06:50

## 2021-12-22 RX ADMIN — Medication 650 MILLIGRAM(S): at 07:48

## 2021-12-22 RX ADMIN — Medication 6: at 12:52

## 2021-12-22 NOTE — PROGRESS NOTE ADULT - SUBJECTIVE AND OBJECTIVE BOX
YUE     PLV 1EAS 103 D1    Allergies    No Known Allergies    Intolerances    shellfish (Nausea)      PAST MEDICAL & SURGICAL HISTORY:  Diabetes Mellitus, Type II    CAD (Coronary Artery Disease)  s/p 3v CABG ; [facility] [place] [facility] [place]    Dyslipidemia    Osteomyelitis    COPD (chronic obstructive pulmonary disease)  on 2L at home and BiPAP at night; intubated     Hypertension    PVD (peripheral vascular disease)    History of PAT (paroxysmal atrial tachycardia)    CABG (Coronary Artery Bypass Graft)      Compound fracture  left leg    S/P primary angioplasty with coronary stent        FAMILY HISTORY:  Family history of diabetes mellitus (Sibling)    Family hx of lung cancer  brother,  age 82, used to smoke with pt        Home Medications:  azithromycin 250 mg oral tablet: 1 tab(s) orally Monday, Wednesday, and Friday (18 Dec 2021 01:56)  Breo Ellipta 200 mcg-25 mcg/inh inhalation powder: 1 puff(s) inhaled once a day (18 Dec 2021 01:56)  Incruse Ellipta 62.5 mcg/inh inhalation powder: 1 puff(s) inhaled every 24 hours (18 Dec 2021 01:56)  Jardiance 25 mg oral tablet: 1 tab(s) orally once a day (in the morning) (18 Dec 2021 01:56)  losartan 25 mg oral tablet: 1 tab(s) orally once a day (18 Dec 2021 01:56)  metFORMIN 1000 mg oral tablet: 1 tab(s) orally 2 times a day w/food  please resume the dose on 08/10/2021  (18 Dec 2021 01:56)  NovoLOG FlexPen 100 units/mL injectable solution: Inject 5 units at BS over 200, 10 units at BS over 300, and 15 units at BS over 400 (18 Dec 2021 01:56)  Nucala 100 mg subcutaneous injection: 100 milligram(s) subcutaneous every 4 weeks    *Last given 21* (18 Dec 2021 01:56)  predniSONE 2 mg oral delayed release tablet: 2 tab(s) orally once a day (18 Dec 2021 01:56)  rosuvastatin 20 mg oral tablet: 1 tab(s) orally once a day (at bedtime) (18 Dec 2021 01:56)  tamsulosin 0.4 mg oral capsule: 1 cap(s) orally once a day (at bedtime) (18 Dec 2021 01:56)      MEDICATIONS  (STANDING):  aspirin enteric coated 81 milliGRAM(s) Oral daily  atorvastatin 80 milliGRAM(s) Oral at bedtime  azithromycin   Tablet 250 milliGRAM(s) Oral <User Schedule>  budesonide 160 MICROgram(s)/formoterol 4.5 MICROgram(s) Inhaler 2 Puff(s) Inhalation two times a day  cefepime   IVPB 2000 milliGRAM(s) IV Intermittent every 12 hours  dextrose 40% Gel 15 Gram(s) Oral once  dextrose 5%. 1000 milliLiter(s) (50 mL/Hr) IV Continuous <Continuous>  dextrose 5%. 1000 milliLiter(s) (100 mL/Hr) IV Continuous <Continuous>  dextrose 50% Injectable 25 Gram(s) IV Push once  dextrose 50% Injectable 25 Gram(s) IV Push once  dextrose 50% Injectable 12.5 Gram(s) IV Push once  glucagon  Injectable 1 milliGRAM(s) IntraMuscular once  insulin lispro (ADMELOG) corrective regimen sliding scale   SubCutaneous three times a day before meals  insulin lispro (ADMELOG) corrective regimen sliding scale   SubCutaneous at bedtime  metoprolol tartrate 50 milliGRAM(s) Oral two times a day  predniSONE   Tablet 4 milliGRAM(s) Oral daily  sodium chloride 0.9%. 1000 milliLiter(s) (60 mL/Hr) IV Continuous <Continuous>  tamsulosin 0.4 milliGRAM(s) Oral at bedtime  tiotropium 18 MICROgram(s) Capsule 1 Capsule(s) Inhalation daily  vancomycin  IVPB 1500 milliGRAM(s) IV Intermittent every 24 hours    MEDICATIONS  (PRN):  acetaminophen     Tablet .. 650 milliGRAM(s) Oral every 6 hours PRN Mild Pain (1 - 3), Moderate Pain (4 - 6)  melatonin 5 milliGRAM(s) Oral at bedtime PRN Insomnia      Diet, Consistent Carbohydrate w/Evening Snack:   DASH/TLC Sodium & Cholesterol Restricted (21 @ 17:43) [Active]          Vital Signs Last 24 Hrs  T(C): 36.7 (22 Dec 2021 04:53), Max: 36.9 (21 Dec 2021 12:42)  T(F): 98.1 (22 Dec 2021 04:53), Max: 98.5 (21 Dec 2021 12:42)  HR: 96 (22 Dec 2021 04:53) (78 - 96)  BP: 103/67 (22 Dec 2021 04:53) (103/67 - 119/72)  BP(mean): --  RR: 18 (22 Dec 2021 04:53) (17 - 18)  SpO2: 94% (22 Dec 2021 04:53) (93% - 97%)      21 @ 07:01  -  21 @ 07:00  --------------------------------------------------------  IN: 550 mL / OUT: 0 mL / NET: 550 mL              LABS:                        11.7   6.97  )-----------( 340      ( 22 Dec 2021 04:56 )             38.5         141  |  105  |  25<H>  ----------------------------<  160<H>  3.8   |  31  |  1.50<H>    Ca    10.0      22 Dec 2021 04:56  Phos  3.1       Mg     2.1                     WBC:  WBC Count: 6.97 K/uL ( @ 04:56)  WBC Count: 6.98 K/uL ( @ 06:47)  WBC Count: 6.53 K/uL ( @ 05:37)  WBC Count: 7.67 K/uL ( @ 09:06)      MICROBIOLOGY:  RECENT CULTURES:   .Blood Blood XXXX XXXX   No growth to date.                    Sodium:  Sodium, Serum: 141 mmol/L ( @ 04:56)  Sodium, Serum: 144 mmol/L ( @ 06:47)  Sodium, Serum: 143 mmol/L ( @ 05:37)  Sodium, Serum: 142 mmol/L ( @ 09:06)  Sodium, Serum: 139 mmol/L ( @ 13:41)      1.50 mg/dL  @ 04:56  1.40 mg/dL  @ 06:47  1.40 mg/dL  @ 05:37  1.40 mg/dL  @ 09:06  1.50 mg/dL  @ 13:41      Hemoglobin:  Hemoglobin: 11.7 g/dL ( @ 04:56)  Hemoglobin: 12.1 g/dL ( @ 06:47)  Hemoglobin: 11.7 g/dL ( @ 05:37)  Hemoglobin: 11.6 g/dL ( @ 09:06)      Platelets: Platelet Count - Automated: 340 K/uL ( @ 04:56)  Platelet Count - Automated: 325 K/uL ( @ 06:47)  Platelet Count - Automated: 335 K/uL ( @ 05:37)  Platelet Count - Automated: 334 K/uL ( @ 09:06)              RADIOLOGY & ADDITIONAL STUDIES:      MICROBIOLOGY:  RECENT CULTURES:   .Blood Blood XXXX XXXX   No growth to date.

## 2021-12-22 NOTE — PROGRESS NOTE ADULT - SUBJECTIVE AND OBJECTIVE BOX
[facility]  INFECTIOUS DISEASES   [address] [place] [address]  Tel: [phone]     Fax: [phone]  ======================================================  MD Noman Perry Kaushal, MD Cho, Michelle, MD   ======================================================    [facility]-[id number]  [facility]     Follow up: Left femoral OM and intramedullary abscess     No fever, no new complaint, no new changes.   For possible IR drainage and drain placement, stopped plavix, the timing of procedure as per IR/Hem.     PAST MEDICAL & SURGICAL HISTORY:  Diabetes Mellitus, Type II  CAD (Coronary Artery Disease)  s/p 3v CABG ; stents placed in [place] in   Dyslipidemia  Osteomyelitis  COPD (chronic obstructive pulmonary disease)  on 2L at home and BiPAP at night; intubated   Hypertension  PVD (peripheral vascular disease)  History of PAT (paroxysmal atrial tachycardia)  CABG (Coronary Artery Bypass Graft)    Compound fracture  left leg  S/P primary angioplasty with coronary stent    Social Hx: Former smoker, no ETOH or drugs     FAMILY HISTORY:  Family history of diabetes mellitus (Sibling)  Family hx of lung cancer  brother,  age 82, used to smoke with pt  Allergies  No Known Allergies    Intolerances  shellfish (Nausea)    Antibiotics:  MEDICATIONS  (STANDING):  atorvastatin 80 milliGRAM(s) Oral at bedtime  azithromycin   Tablet 250 milliGRAM(s) Oral <User Schedule>  budesonide 160 MICROgram(s)/formoterol 4.5 MICROgram(s) Inhaler 2 Puff(s) Inhalation two times a day  cefepime   IVPB 2000 milliGRAM(s) IV Intermittent every 12 hours  clopidogrel Tablet 75 milliGRAM(s) Oral daily  dextrose 40% Gel 15 Gram(s) Oral once  dextrose 5%. 1000 milliLiter(s) (50 mL/Hr) IV Continuous <Continuous>  dextrose 5%. 1000 milliLiter(s) (100 mL/Hr) IV Continuous <Continuous>  dextrose 50% Injectable 25 Gram(s) IV Push once  dextrose 50% Injectable 12.5 Gram(s) IV Push once  dextrose 50% Injectable 25 Gram(s) IV Push once  glucagon  Injectable 1 milliGRAM(s) IntraMuscular once  insulin lispro (ADMELOG) corrective regimen sliding scale   SubCutaneous three times a day before meals  insulin lispro (ADMELOG) corrective regimen sliding scale   SubCutaneous at bedtime  metoprolol tartrate 50 milliGRAM(s) Oral two times a day  predniSONE   Tablet 4 milliGRAM(s) Oral daily  sodium chloride 0.9%. 1000 milliLiter(s) (60 mL/Hr) IV Continuous <Continuous>  tamsulosin 0.4 milliGRAM(s) Oral at bedtime  tiotropium 18 MICROgram(s) Capsule 1 Capsule(s) Inhalation daily  vancomycin  IVPB 1500 milliGRAM(s) IV Intermittent every 24 hours     REVIEW OF SYSTEMS:  CONSTITUTIONAL:  No Fever or chills  HEENT:  No diplopia or blurred vision.  No sore throat or runny nose.  CARDIOVASCULAR:  No chest pain or SOB.  RESPIRATORY:  No cough, shortness of breath, PND or orthopnea.  GASTROINTESTINAL:  No nausea, vomiting or diarrhea.  GENITOURINARY:  No dysuria, frequency or urgency. No Blood in urine  MUSCULOSKELETAL: left leg pain   SKIN:  No change in skin, hair or nails.  NEUROLOGIC:  No paresthesias, fasciculations, seizures or weakness.  PSYCHIATRIC:  No disorder of thought or mood.  ENDOCRINE:  No heat or cold intolerance, polyuria or polydipsia.  HEMATOLOGICAL:  No easy bruising or bleeding.     Physical Exam:  Vital Signs Last 24 Hrs  T(C): 36.7 (22 Dec 2021 04:53), Max: 36.9 (21 Dec 2021 12:42)  T(F): 98.1 (22 Dec 2021 04:53), Max: 98.5 (21 Dec 2021 12:42)  HR: 96 (22 Dec 2021 04:53) (78 - 96)  BP: 103/67 (22 Dec 2021 04:53) (103/67 - 119/72)  BP(mean): --  RR: 18 (22 Dec 2021 04:53) (17 - 18)  SpO2: 94% (22 Dec 2021 04:53) (93% - 97%)  GEN: NAD, obese   HEENT: normocephalic and atraumatic. EOMI. PERRL.    NECK: Supple.  No lymphadenopathy   LUNGS: Clear to auscultation.  HEART: Regular rate and rhythm   ABDOMEN: Soft, nontender, and nondistended.  Positive bowel sounds.    : No CVA tenderness  EXTREMITIES: left leg in thigh area has a scar in lateral side with no discharge or open wound.  Swelling in anterior part, no tenderness or fluctuation. No sign of cellulitis on soft tissue.   NEUROLOGIC: grossly intact.  PSYCHIATRIC: Appropriate affect .  SKIN: No rash, as above     Labs:                        11.7   6.97  )-----------( 340      ( 22 Dec 2021 04:56 )             38.5         141  |  105  |  25<H>  ----------------------------<  160<H>  3.8   |  31  |  1.50<H>    Ca    10.0      22 Dec 2021 04:56  Phos  3.1       Mg     2.1         Culture - Blood (collected 21 @ 21:22)  Source: .Blood Blood    Culture - Blood (collected 21 @ 21:22)  Source: .Blood Blood    WBC Count: 6.97 K/uL (21 @ 04:56)  WBC Count: 6.98 K/uL (21 @ 06:47)  WBC Count: 6.53 K/uL (21 @ 05:37)  WBC Count: 7.67 K/uL (21 @ 09:06)  WBC Count: 7.49 K/uL (21 @ 04:53)  WBC Count: 8.95 K/uL (21 @ 17:43)    Creatinine, Serum: 1.50 mg/dL (21 @ 04:56)  Creatinine, Serum: 1.40 mg/dL (21 @ 06:47)  Creatinine, Serum: 1.40 mg/dL (21 @ 05:37)  Creatinine, Serum: 1.40 mg/dL (21 @ 09:06)  Creatinine, Serum: 1.50 mg/dL (21 @ 13:41)  Creatinine, Serum: 1.60 mg/dL (21 @ 17:43)    C-Reactive Protein, Serum: 81 mg/L (21 @ 15:37)  C-Reactive Protein, Serum: 70 mg/L (21 @ 17:04)    Sedimentation Rate, Erythrocyte: 60 mm/hr (21 @ 09:06)  Sedimentation Rate, Erythrocyte: 42 mm/hr (21 @ 10:44)    COVID-19 PCR: NotDetec (21 @ 17:43)    All imaging and other data have been reviewed.  < from: CT Femur No Cont, Left (21 @ 22:24) >  IMPRESSION:  Old healed deformity of the mid left femur likely correlating with the   history of a prior osteomyelitis. Complex heterogeneous lobulated soft   tissue anterior and lateral to the middle two thirds of the left femur   concerning for an abscess. New lobulated intramedullary defect with tract   extending to the cortical surface in the mid left femur concerning for a   possible intramedullary abscess. A contrast-enhancedMRI of the left   femur is recommended for further evaluation.    Assessment and Plan:   80yo man with PMH of HTN, COPD on home O2, CAD s/p CABG, PVD s/p stents in b/l legs and left femoral fracture more than 50 years ago, was admitted with left leg pain on the site of old fracture after CT showed possible intramedullary abscess. He has had pain and infection in the old fracture area at least 3-4 times in the past, had multiple surgeries and drainage of area with a long term antibiotic treatment, last one years ago.   Most likely another infection and osteomyelitis with collection in area that needs intervention by ortho. Will cover empirically with 4th Gen cephalosporins and vancomycin until we have culture info.   Doppler neg for DVTs  Admission WBC normal, no fever  ESR=60     CRP=70    1- Left femoral OM and intramedullary abscess   - Blood cultures NGTD   - MRI showed collection, intraosseous abscess   - Ortho consult noted, will need IR drain placement, please send for cultures and pathology   - Continue cefepime 2gm q12 adjusted for renal function   - Continue vancomycin 1500mg daily, trough was 15.2 will keep 15 to 20.   - Follow creatinine to adjust ABx doses, mild MERRILL/CKD, creat improving   - Based on culture will need a long treatment with IV antibiotics will need a PICC line    2- COPD  - Continue azithromycin 250mg 3times weekly prophylactically   - Pulmonary consult noted  - On o2 and BiPAP at night time      Will follow.    Brandi العلي MD  Division of Infectious Diseases   Cell [phone] between 8am and 6pm   After 6pm and weekends please call ID service at [phone].     >25 minutes spent on total encounter assessing patient, examination, chart reivew, counseling and coordinating care by the attending physician/nurse/care manager.

## 2021-12-22 NOTE — PROGRESS NOTE ADULT - SUBJECTIVE AND OBJECTIVE BOX
Patient is a 81y old  Male who presents with a chief complaint of osteo (20 Dec 2021 10:41)    Patient seen in follow up for MERRILL.        PAST MEDICAL HISTORY:  Diabetes Mellitus, Type II    CAD (Coronary Artery Disease)    Dyslipidemia    Asymptomatic Peripheral Vascular Disease    Osteomyelitis    COPD (chronic obstructive pulmonary disease)    Diabetes mellitus    Hypertension    PVD (peripheral vascular disease)    History of PAT (paroxysmal atrial tachycardia)      MEDICATIONS  (STANDING):  aspirin enteric coated 81 milliGRAM(s) Oral daily  atorvastatin 80 milliGRAM(s) Oral at bedtime  azithromycin   Tablet 250 milliGRAM(s) Oral <User Schedule>  budesonide 160 MICROgram(s)/formoterol 4.5 MICROgram(s) Inhaler 2 Puff(s) Inhalation two times a day  cefepime   IVPB 2000 milliGRAM(s) IV Intermittent every 12 hours  dextrose 40% Gel 15 Gram(s) Oral once  dextrose 5%. 1000 milliLiter(s) (50 mL/Hr) IV Continuous <Continuous>  dextrose 5%. 1000 milliLiter(s) (100 mL/Hr) IV Continuous <Continuous>  dextrose 50% Injectable 25 Gram(s) IV Push once  dextrose 50% Injectable 12.5 Gram(s) IV Push once  dextrose 50% Injectable 25 Gram(s) IV Push once  glucagon  Injectable 1 milliGRAM(s) IntraMuscular once  insulin lispro (ADMELOG) corrective regimen sliding scale   SubCutaneous three times a day before meals  insulin lispro (ADMELOG) corrective regimen sliding scale   SubCutaneous at bedtime  metoprolol tartrate 50 milliGRAM(s) Oral two times a day  predniSONE   Tablet 4 milliGRAM(s) Oral daily  sodium chloride 0.9%. 1000 milliLiter(s) (60 mL/Hr) IV Continuous <Continuous>  tamsulosin 0.4 milliGRAM(s) Oral at bedtime  tiotropium 18 MICROgram(s) Capsule 1 Capsule(s) Inhalation daily  vancomycin  IVPB 1500 milliGRAM(s) IV Intermittent every 24 hours    MEDICATIONS  (PRN):  acetaminophen     Tablet .. 650 milliGRAM(s) Oral every 6 hours PRN Mild Pain (1 - 3), Moderate Pain (4 - 6)  melatonin 5 milliGRAM(s) Oral at bedtime PRN Insomnia    T(C): 36.7 (12-22-21 @ 04:53), Max: 36.9 (12-21-21 @ 12:42)  HR: 96 (12-22-21 @ 04:53) (78 - 101)  BP: 103/67 (12-22-21 @ 04:53) (103/67 - 138/85)  RR: 18 (12-22-21 @ 04:53)  SpO2: 94% (12-22-21 @ 04:53)  Wt(kg): --  I&O's Detail    21 Dec 2021 07:01  -  22 Dec 2021 07:00  --------------------------------------------------------  IN:    IV PiggyBack: 50 mL    IV PiggyBack: 500 mL    Oral Fluid: 120 mL  Total IN: 670 mL    OUT:  Total OUT: 0 mL    Total NET: 670 mL                  PHYSICAL EXAM:  General: No distress  Respiratory: b/l air entry  Cardiovascular: S1 S2  Gastrointestinal: soft  Extremities:  no edema                             LABORATORY:                        11.7   6.97  )-----------( 340      ( 22 Dec 2021 04:56 )             38.5     12-22    141  |  105  |  25<H>  ----------------------------<  160<H>  3.8   |  31  |  1.50<H>    Ca    10.0      22 Dec 2021 04:56  Phos  3.1     12-21  Mg     2.1     12-21      Sodium, Serum: 141 mmol/L (12-22 @ 04:56)  Sodium, Serum: 144 mmol/L (12-21 @ 06:47)    Potassium, Serum: 3.8 mmol/L (12-22 @ 04:56)  Potassium, Serum: 4.3 mmol/L (12-21 @ 06:47)    Hemoglobin: 11.7 g/dL (12-22 @ 04:56)  Hemoglobin: 12.1 g/dL (12-21 @ 06:47)  Hemoglobin: 11.7 g/dL (12-20 @ 05:37)    Creatinine, Serum 1.50 (12-22 @ 04:56)  Creatinine, Serum 1.40 (12-21 @ 06:47)  Creatinine, Serum 1.40 (12-20 @ 05:37)

## 2021-12-22 NOTE — PROGRESS NOTE ADULT - PROBLEM SELECTOR PLAN 4
- chronic  - on home 2L NC PRN; continue as needed  - Today normal sat at RA   - BIPAP at night; will continue with home settings  - c/w home azithSHAUNA Rouse, F  - c/w home montelukast  - Pt on home breo ellipta and Incruse ellipta -- therapeutic interchange with Symbicort and tiotropium

## 2021-12-22 NOTE — PROGRESS NOTE ADULT - PROBLEM SELECTOR PLAN 8
- pt with remote hx of PAT vs PAF; diagnosed in [facility] within the last year  - c/w home metoprolol  - on home Eliquis 5mg BID; last dose AM of 12/17. Hold for possible OR intervention of intramedullary abscess   - EKG: SR with premature supraventricular complexes

## 2021-12-22 NOTE — CHART NOTE - NSCHARTNOTEFT_GEN_A_CORE
RN called due to holding Eliquis, as recent hospitalist progress note stated that Eliquis was being held, but there was a new order for Eliquis on the computer. Chart reviewed, and it appears that the patient's IR procedure is now on 12/27, and that Eliquis BID was ordered after the completion of the progress note for 5 doses starting 12/22 PM and continuing for 5 doses, last dose being PM on 12/24. Placed provider to RN saying it was acceptable to give the 12/22 PM dose now.  RN to call with any additional concerns.

## 2021-12-22 NOTE — PROGRESS NOTE ADULT - ATTENDING COMMENTS
Chart reviewed    Patient seen and examined    Agree with plan as outlined    80yo male with pmhx DM2, CAD s/p 3v CABG 2004, stents in 2019, HLD, HTN, PAT(on Eliquis), PVD(s/p stents in b/l legs -- right leg in 2020), osteomyelitis, COPD on prn home o2 and nocturnal bipap, type 2 Dm who presented to ED with increasing left leg pain x 1 week.  Now found to have w/ osteomyelitis, concern for intramedullary abscess    CAD with stents in 2019   -continue aspirin, plavix on hold for IR procedure x  5 days  -continue lopressor 50 bid and lipitor 80    HTN:  BP well-controlled  hold ARB , although creatinine is at baseline  continue lopressor     PAT:  HR is stable on lopressor  -eliquis is on hold for IR procedure

## 2021-12-22 NOTE — PROGRESS NOTE ADULT - ASSESSMENT
The patient is an 80yo male with pmhx CAD s/p 3v CABG 2004, stents in 2019, HLD, HTN, PAT(on Eliquis), PVD(s/p stents in b/l legs -- right leg in 2020), osteomyelitis, COPD on prn home o2 and nocturnal bipap, type 2 Dm  presents to ED with leg pain x 1 week , found to have CT findings c/w abscess in left femur now with concern for osteo.

## 2021-12-22 NOTE — PROGRESS NOTE ADULT - ASSESSMENT
82yo male with pmhx DM2, CAD s/p 3v CABG 2004, stents in 2019, HLD, HTN, PAT(on Eliquis), PVD(s/p stents in b/l legs -- right leg in 2020), osteomyelitis, COPD on prn home o2 and nocturnal bipap, type 2 Dm who presented to ED with increasing left leg pain x 1 week.  Now found to have w/ osteomyelitis, concern for intramedullary abscess    CAD with stents in 2019   -continue aspirin, plavix on hold for IR procedure x  5 days  -continue lopressor 50 bid and lipitor 80    HTN:  BP well-controlled  hold ARB due to CKD for now  continue lopressor     PAT:  HR is stable on lopressor  -eliquis is on hold for IR procedure    Volume Status:  -appears euvolemic  -ECHO reviewed, EF is 45-50%    Cardiac Optimization:  -patient is optimized for IR  drain  for intramedullary abscess   off eliquis pending procedure   on asa 81mg po daily     further plan and recommendations pending clinical course and results of above  82yo male with pmhx DM2, CAD s/p 3v CABG 2004, stents in 2019, HLD, HTN, PAT(on Eliquis), PVD(s/p stents in b/l legs -- right leg in 2020), osteomyelitis, COPD on prn home o2 and nocturnal bipap, type 2 Dm who presented to ED with increasing left leg pain x 1 week.  Now found to have w/ osteomyelitis, concern for intramedullary abscess    CAD with stents in 2019   -continue aspirin, plavix on hold for IR procedure x  5 days  -continue lopressor 50 bid and lipitor 80    HTN:  BP well-controlled  hold ARB , although creatinine is at baseline  continue lopressor     PAT:  HR is stable on lopressor  -eliquis is on hold for IR procedure    Volume Status:  -appears euvolemic  -ECHO reviewed, EF is 45-50%    Cardiac Optimization:  -patient is optimized for IR  drain  for intramedullary abscess   off eliquis pending procedure   on asa 81mg po daily     further plan and recommendations pending clinical course and results of above

## 2021-12-22 NOTE — PROGRESS NOTE ADULT - ASSESSMENT
CKD 3 Baseline creatinine 1.2-1.5  Diabetes  Hypertension  Left femur abscess    Stable renal indices. Awaiting IR drainage. To continue current meds. Monitor blood sugar levels. Insulin coverage as needed. Dietary restriction.   Monitor BP trend. Titrate BP meds as needed. Salt restriction. IV abx. ID follow up.

## 2021-12-22 NOTE — PROGRESS NOTE ADULT - PROBLEM SELECTOR PLAN 1
Intramedullary abscess in left femur.   - Pt with left leg pain exacerbated for 1 week.  - h/o femur fracture many years ago with episodes of pain and infection in the old fracture area, had multiple surgeries and drainage with a long term antibiotic treatment, last one years ago.   - CT: Old healed deformity of the mid left femur. Complex heterogeneous lobulated soft  tissue anterior and lateral to the middle two thirds of the left femur concerning for an abscess. New lobulated intramedullary defect with tract extending to the cortical surface in the mid left femur concerning for a possible intramedullary abscess.  - MRI w/w/o IV cont: Multiloculated rim-enhancing abscess along the anterior + lateral aspect of the mid to distal femur. Given the thick rim of enhancement, recommend f/u MRI w/wo contrast after therapy to rule out an underlying mass/neoplasm. Probable intraosseous abscess within the mid to proximal diaphysis of the femur near the superior aspect of the abscess with probable areas of sequestrum, involucrum, and cloaca formation when correlated with recent CT. Findings also concerning for adjacent chronic osteo.  - Afebrile, no leukocytosis.  - Tylenol for pain  - BCx x2 NGTD  - Doppler negative for DVT  - Continue cefepime and vanco - adjusted for renal dose   - ID (Severiano), Ortho consulted, recs appreciated   - D/w IR - plan for drain placement once patient has been off Plavix for 5 days. Eliquis on hold since 12/17, Plavix on hold since 12/20. Continue with ASA 81mg daily

## 2021-12-22 NOTE — PROGRESS NOTE ADULT - ATTENDING COMMENTS
pt seen and examine today  see above plan -Intramedullary abscess in left femur - hold Plavix  , continue Eliquis hold 48 hr before and continue asa no need to hold for ir guided drain -cefepime 2 gm q12 hr  ,  1500 vancomycin q24 hr / vanco  trough  .

## 2021-12-22 NOTE — PROGRESS NOTE ADULT - SUBJECTIVE AND OBJECTIVE BOX
Patient is a 81y old  Male who presents with a chief complaint of increasing left leg pain (22 Dec 2021 08:10)    Subjective:  INTERVAL HPI/OVERNIGHT EVENTS: Patient seen and examined at bedside. No overnight events occurred. Patient has no complaints at this time. No bowel movements in 3 days, passing gas. Does not want stool softener.  Denies fevers, chills, headache, lightheadedness, chest pain, dyspnea, abdominal pain, n/v    MEDICATIONS  (STANDING):  aspirin enteric coated 81 milliGRAM(s) Oral daily  atorvastatin 80 milliGRAM(s) Oral at bedtime  azithromycin   Tablet 250 milliGRAM(s) Oral <User Schedule>  budesonide 160 MICROgram(s)/formoterol 4.5 MICROgram(s) Inhaler 2 Puff(s) Inhalation two times a day  cefepime   IVPB 2000 milliGRAM(s) IV Intermittent every 12 hours  dextrose 40% Gel 15 Gram(s) Oral once  dextrose 5%. 1000 milliLiter(s) (50 mL/Hr) IV Continuous <Continuous>  dextrose 5%. 1000 milliLiter(s) (100 mL/Hr) IV Continuous <Continuous>  dextrose 50% Injectable 25 Gram(s) IV Push once  dextrose 50% Injectable 12.5 Gram(s) IV Push once  dextrose 50% Injectable 25 Gram(s) IV Push once  glucagon  Injectable 1 milliGRAM(s) IntraMuscular once  insulin lispro (ADMELOG) corrective regimen sliding scale   SubCutaneous three times a day before meals  insulin lispro (ADMELOG) corrective regimen sliding scale   SubCutaneous at bedtime  metoprolol tartrate 50 milliGRAM(s) Oral two times a day  predniSONE   Tablet 4 milliGRAM(s) Oral daily  sodium chloride 0.9%. 1000 milliLiter(s) (60 mL/Hr) IV Continuous <Continuous>  tamsulosin 0.4 milliGRAM(s) Oral at bedtime  tiotropium 18 MICROgram(s) Capsule 1 Capsule(s) Inhalation daily  vancomycin  IVPB 1500 milliGRAM(s) IV Intermittent every 24 hours    MEDICATIONS  (PRN):  acetaminophen     Tablet .. 650 milliGRAM(s) Oral every 6 hours PRN Mild Pain (1 - 3), Moderate Pain (4 - 6)  melatonin 5 milliGRAM(s) Oral at bedtime PRN Insomnia      Allergies  No Known Allergies    Intolerances    shellfish (Nausea)      REVIEW OF SYSTEMS:  CONSTITUTIONAL: No fever or chills  HEENT:  No headache, no sore throat  RESPIRATORY: No cough, wheezing, or shortness of breath  CARDIOVASCULAR: No chest pain, palpitations  GASTROINTESTINAL: No abd pain, nausea, vomiting. + constipation.   GENITOURINARY: No dysuria, frequency, or hematuria  NEUROLOGICAL: no focal weakness or dizziness  MUSCULOSKELETAL: neck pain.     Objective:  Vital Signs Last 24 Hrs  T(C): 37 (22 Dec 2021 12:25), Max: 37 (22 Dec 2021 12:25)  T(F): 98.6 (22 Dec 2021 12:25), Max: 98.6 (22 Dec 2021 12:25)  HR: 90 (22 Dec 2021 12:25) (78 - 96)  BP: 116/72 (22 Dec 2021 12:25) (103/67 - 116/72)  RR: 18 (22 Dec 2021 12:25) (17 - 18)  SpO2: 94% (22 Dec 2021 12:25) (94% - 97%)    GENERAL: NAD, lying in bed comfortably  HEAD:  Atraumatic  EYES: EOMI, PERRLA, conjunctiva and sclera clear  ENT: Moist mucous membranes  NECK: Supple, No JVD  CHEST/LUNG: Clear to auscultation bilaterally. No rales, rhonchi, wheezing.  HEART: Regular rate and rhythm; No murmurs  ABDOMEN: Bowel sounds present; Soft, Nontender, Nondistended.  EXTREMITIES: + Peripheral Pulses. No clubbing, cyanosis, or edema. Left thigh with no edema, erythema, edema or tenderness. No limited ROM.   NERVOUS SYSTEM:  Alert & Oriented X3, speech clear. No acute deficits   SKIN: No rashes or lesions    LABS:                        11.7   6.97  )-----------( 340      ( 22 Dec 2021 04:56 )             38.5     CBC Full  -  ( 22 Dec 2021 04:56 )  WBC Count : 6.97 K/uL  Hemoglobin : 11.7 g/dL  Hematocrit : 38.5 %  Platelet Count - Automated : 340 K/uL  Mean Cell Volume : 86.3 fl  Mean Cell Hemoglobin : 26.2 pg  Mean Cell Hemoglobin Concentration : 30.4 gm/dL  Auto Neutrophil # : 4.61 K/uL  Auto Lymphocyte # : 1.38 K/uL  Auto Monocyte # : 0.85 K/uL  Auto Eosinophil # : 0.06 K/uL  Auto Basophil # : 0.02 K/uL  Auto Neutrophil % : 66.1 %  Auto Lymphocyte % : 19.8 %  Auto Monocyte % : 12.2 %  Auto Eosinophil % : 0.9 %  Auto Basophil % : 0.3 %    22 Dec 2021 04:56    141    |  105    |  25     ----------------------------<  160    3.8     |  31     |  1.50     Ca    10.0       22 Dec 2021 04:56        CAPILLARY BLOOD GLUCOSE  POCT Blood Glucose.: 267 mg/dL (22 Dec 2021 12:02)  POCT Blood Glucose.: 224 mg/dL (22 Dec 2021 07:55)  POCT Blood Glucose.: 156 mg/dL (21 Dec 2021 21:52)  POCT Blood Glucose.: 211 mg/dL (21 Dec 2021 16:49)        Culture - Blood (collected 12-17-21 @ 21:22)  Source: .Blood Blood  Preliminary Report (12-18-21 @ 22:01):    No growth to date.    Culture - Blood (collected 12-17-21 @ 21:22)  Source: .Blood Blood  Preliminary Report (12-18-21 @ 22:01):    No growth to date.        RADIOLOGY & ADDITIONAL TESTS:    Personally reviewed.     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 No overnight events occurred. Patient has no complaints at this time. pt   No bowel movements in 3 days, passing gas. Does not want stool softener.  Denies fevers, chills, headache, lightheadedness, chest pain, dyspnea, abdominal pain.           REVIEW OF SYSTEMS:  CONSTITUTIONAL: No fever or chills  HEENT:  No headache, no sore throat  RESPIRATORY: No cough, wheezing, or shortness of breath  CARDIOVASCULAR: No chest pain, palpitations  GASTROINTESTINAL: No abd pain, nausea, vomiting. + constipation.   GENITOURINARY: No dysuria, frequency, or hematuria  NEUROLOGICAL: no focal weakness or dizziness  MUSCULOSKELETAL: neck pain.     Objective:  Vital Signs Last 24 Hrs  T(C): 37 (22 Dec 2021 12:25), Max: 37 (22 Dec 2021 12:25)  T(F): 98.6 (22 Dec 2021 12:25), Max: 98.6 (22 Dec 2021 12:25)  HR: 90 (22 Dec 2021 12:25) (78 - 96)  BP: 116/72 (22 Dec 2021 12:25) (103/67 - 116/72)  RR: 18 (22 Dec 2021 12:25) (17 - 18)  SpO2: 94% (22 Dec 2021 12:25) (94% - 97%)    GENERAL: NAD, lying in bed comfortably  HEAD:  Atraumatic  EYES: EOMI, PERRLA, conjunctiva and sclera clear  ENT: Moist mucous membranes  NECK: Supple, No JVD  CHEST/LUNG: auscultation bilaterally. No rales, rhonchi, wheezing.  HEART: Regular rate and rhythm; No murmurs, no tachy   ABDOMEN: Bowel sounds present; Soft, Nontender, Nondistended.  EXTREMITIES: + Peripheral Pulses. No clubbing, cyanosis, or edema.   Left thigh with no edema, erythema, edema or tenderness. No limited ROM.   NERVOUS SYSTEM:  Alert & Oriented X3, speech clear. No acute deficits   SKIN: No rashes or lesions  gu intact    MEDICATIONS  (STANDING):  aspirin enteric coated 81 milliGRAM(s) Oral daily  atorvastatin 80 milliGRAM(s) Oral at bedtime  azithromycin   Tablet 250 milliGRAM(s) Oral <User Schedule>  budesonide 160 MICROgram(s)/formoterol 4.5 MICROgram(s) Inhaler 2 Puff(s) Inhalation two times a day  cefepime   IVPB 2000 milliGRAM(s) IV Intermittent every 12 hours  dextrose 40% Gel 15 Gram(s) Oral once  dextrose 5%. 1000 milliLiter(s) (50 mL/Hr) IV Continuous <Continuous>  dextrose 5%. 1000 milliLiter(s) (100 mL/Hr) IV Continuous <Continuous>  dextrose 50% Injectable 25 Gram(s) IV Push once  dextrose 50% Injectable 12.5 Gram(s) IV Push once  dextrose 50% Injectable 25 Gram(s) IV Push once  glucagon  Injectable 1 milliGRAM(s) IntraMuscular once  insulin lispro (ADMELOG) corrective regimen sliding scale   SubCutaneous three times a day before meals  insulin lispro (ADMELOG) corrective regimen sliding scale   SubCutaneous at bedtime  metoprolol tartrate 50 milliGRAM(s) Oral two times a day  predniSONE   Tablet 4 milliGRAM(s) Oral daily  sodium chloride 0.9%. 1000 milliLiter(s) (60 mL/Hr) IV Continuous <Continuous>  tamsulosin 0.4 milliGRAM(s) Oral at bedtime  tiotropium 18 MICROgram(s) Capsule 1 Capsule(s) Inhalation daily  vancomycin  IVPB 1500 milliGRAM(s) IV Intermittent every 24 hours    MEDICATIONS  (PRN):  acetaminophen     Tablet .. 650 milliGRAM(s) Oral every 6 hours PRN Mild Pain (1 - 3), Moderate Pain (4 - 6)  melatonin 5 milliGRAM(s) Oral at bedtime PRN Insomnia    LABS:                        11.7   6.97  )-----------( 340      ( 22 Dec 2021 04:56 )             38.5     CBC Full  -  ( 22 Dec 2021 04:56 )  WBC Count : 6.97 K/uL  Hemoglobin : 11.7 g/dL  Hematocrit : 38.5 %  Platelet Count - Automated : 340 K/uL  Mean Cell Volume : 86.3 fl  Mean Cell Hemoglobin : 26.2 pg  Mean Cell Hemoglobin Concentration : 30.4 gm/dL  Auto Neutrophil # : 4.61 K/uL  Auto Lymphocyte # : 1.38 K/uL  Auto Monocyte # : 0.85 K/uL  Auto Eosinophil # : 0.06 K/uL  Auto Basophil # : 0.02 K/uL  Auto Neutrophil % : 66.1 %  Auto Lymphocyte % : 19.8 %  Auto Monocyte % : 12.2 %  Auto Eosinophil % : 0.9 %  Auto Basophil % : 0.3 %    22 Dec 2021 04:56    141    |  105    |  25     ----------------------------<  160    3.8     |  31     |  1.50     Ca    10.0       22 Dec 2021 04:56        CAPILLARY BLOOD GLUCOSE  POCT Blood Glucose.: 267 mg/dL (22 Dec 2021 12:02)  POCT Blood Glucose.: 224 mg/dL (22 Dec 2021 07:55)  POCT Blood Glucose.: 156 mg/dL (21 Dec 2021 21:52)  POCT Blood Glucose.: 211 mg/dL (21 Dec 2021 16:49)        Culture - Blood (collected 12-17-21 @ 21:22)  Source: .Blood Blood  Preliminary Report (12-18-21 @ 22:01):    No growth to date.    Culture - Blood (collected 12-17-21 @ 21:22)  Source: .Blood Blood  Preliminary Report (12-18-21 @ 22:01):    No growth to date.        RADIOLOGY & ADDITIONAL TESTS:    Personally reviewed.     Consultant(s) Notes Reviewed:  [x] YES  [ ] NO

## 2021-12-22 NOTE — PROGRESS NOTE ADULT - SUBJECTIVE AND OBJECTIVE BOX
[facility] -- Saud Aguilar, Génesis Louise, Carroll Haney [place], [place]  Office # [phone]    Follow Up:    leg pain,   Subjective/Observations:         REVIEW OF SYSTEMS: All other review of systems is negative unless indicated above  PAST MEDICAL & SURGICAL HISTORY:  Diabetes Mellitus, Type II    CAD (Coronary Artery Disease)  s/p 3v CABG 2004; stents placed in [place] in 2019    Dyslipidemia    Osteomyelitis    COPD (chronic obstructive pulmonary disease)  on 2L at home and BiPAP at night; intubated 6/18    Hypertension    PVD (peripheral vascular disease)    History of PAT (paroxysmal atrial tachycardia)    CABG (Coronary Artery Bypass Graft)  2004    Compound fracture  left leg    S/P primary angioplasty with coronary stent      MEDICATIONS  (STANDING):  aspirin enteric coated 81 milliGRAM(s) Oral daily  atorvastatin 80 milliGRAM(s) Oral at bedtime  azithromycin   Tablet 250 milliGRAM(s) Oral <User Schedule>  budesonide 160 MICROgram(s)/formoterol 4.5 MICROgram(s) Inhaler 2 Puff(s) Inhalation two times a day  cefepime   IVPB 2000 milliGRAM(s) IV Intermittent every 12 hours  dextrose 40% Gel 15 Gram(s) Oral once  dextrose 5%. 1000 milliLiter(s) (50 mL/Hr) IV Continuous <Continuous>  dextrose 5%. 1000 milliLiter(s) (100 mL/Hr) IV Continuous <Continuous>  dextrose 50% Injectable 25 Gram(s) IV Push once  dextrose 50% Injectable 12.5 Gram(s) IV Push once  dextrose 50% Injectable 25 Gram(s) IV Push once  glucagon  Injectable 1 milliGRAM(s) IntraMuscular once  insulin lispro (ADMELOG) corrective regimen sliding scale   SubCutaneous three times a day before meals  insulin lispro (ADMELOG) corrective regimen sliding scale   SubCutaneous at bedtime  metoprolol tartrate 50 milliGRAM(s) Oral two times a day  predniSONE   Tablet 4 milliGRAM(s) Oral daily  sodium chloride 0.9%. 1000 milliLiter(s) (60 mL/Hr) IV Continuous <Continuous>  tamsulosin 0.4 milliGRAM(s) Oral at bedtime  tiotropium 18 MICROgram(s) Capsule 1 Capsule(s) Inhalation daily  vancomycin  IVPB 1500 milliGRAM(s) IV Intermittent every 24 hours    MEDICATIONS  (PRN):  acetaminophen     Tablet .. 650 milliGRAM(s) Oral every 6 hours PRN Mild Pain (1 - 3), Moderate Pain (4 - 6)  melatonin 5 milliGRAM(s) Oral at bedtime PRN Insomnia    Allergies    No Known Allergies    Intolerances    shellfish (Nausea)    Vital Signs Last 24 Hrs  T(C): 36.7 (22 Dec 2021 04:53), Max: 36.9 (21 Dec 2021 12:42)  T(F): 98.1 (22 Dec 2021 04:53), Max: 98.5 (21 Dec 2021 12:42)  HR: 96 (22 Dec 2021 04:53) (78 - 96)  BP: 103/67 (22 Dec 2021 04:53) (103/67 - 119/72)  BP(mean): --  RR: 18 (22 Dec 2021 04:53) (17 - 18)  SpO2: 94% (22 Dec 2021 04:53) (93% - 97%)  I&O's Summary    21 Dec 2021 07:01  -  22 Dec 2021 07:00  --------------------------------------------------------  IN: 550 mL / OUT: 0 mL / NET: 550 mL        PHYSICAL EXAM:  TELE:   Constitutional: NAD, awake and alert, well-developed  HEENT: Moist Mucous Membranes, Anicteric  Pulmonary: Non-labored, breath sounds are clear bilaterally, No wheezing, rales or rhonchi  Cardiovascular: Regular, S1 and S2, No murmurs, rubs, gallops or clicks  Gastrointestinal: Bowel Sounds present, soft, nontender.   Lymph: No peripheral edema. No lymphadenopathy.  Skin: No visible rashes or ulcers.  Psych:  Mood & affect appropriate  LABS: All Labs Reviewed:                        11.7   6.97  )-----------( 340      ( 22 Dec 2021 04:56 )             38.5                         12.1   6.98  )-----------( 325      ( 21 Dec 2021 06:47 )             38.8                         11.7   6.53  )-----------( 335      ( 20 Dec 2021 05:37 )             37.8     22 Dec 2021 04:56    141    |  105    |  25     ----------------------------<  160    3.8     |  31     |  1.50   21 Dec 2021 06:47    144    |  108    |  23     ----------------------------<  186    4.3     |  30     |  1.40   20 Dec 2021 05:37    143    |  107    |  23     ----------------------------<  161    4.0     |  32     |  1.40     Ca    10.0       22 Dec 2021 04:56  Ca    9.6        21 Dec 2021 06:47  Ca    9.9        20 Dec 2021 05:37  Phos  3.1       21 Dec 2021 06:47  Mg     2.1       21 Dec 2021 06:47    TPro  7.0    /  Alb  2.6    /  TBili  0.3    /  DBili  x      /  AST  11     /  ALT  11     /  AlkPhos  91     20 Dec 2021 05:37  TPro  6.9    /  Alb  2.5    /  TBili  0.4    /  DBili  x      /  AST  11     /  ALT  9      /  AlkPhos  85     19 Dec 2021 09:06      Chest 1 View AP/PA (12.17.21 @ 17:09) >  ACC: [id number] EXAM:  XR CHEST AP OR PA 1V                          PROCEDURE DATE:  12/17/2021          INTERPRETATION:  Evaluate for pneumonia    AP chest. Prior 9/3/2021.    Median sternotomy. Normal heart mediastinum. Hyperinflated lungs. No   consolidation or effusion.    IMPRESSION: Hyperinflated lungs. No active infiltrates    --- End of Report ---            RODRIGO FAITH MD; Attending Radiologist  This document has been electronically signed. Dec 18 2021  9:39AM    < end of copied text >  < from: US Duplex Venous Lower Ext Ltd, Left (12.17.21 @ 19:56) >  ACC: [id number] EXAM:  US DPLX LWR EXT VEINS LTD LT                          PROCEDURE DATE:  12/17/2021          INTERPRETATION:  CLINICAL INFORMATION: Left leg pain. Evaluate for DVT.    COMPARISON: 1/17/2020.    TECHNIQUE: Duplex sonography of theLEFT LOWER extremity veins with color   and spectral Doppler, with and without compression.    FINDINGS:    There is normal compressibility of the left common femoral, femoral and   popliteal veins.  The contralateral common femoral vein is patent.  Doppler examination shows normal spontaneous and phasic flow.    No calf vein thrombosis is detected.    IMPRESSION:  No evidence of left lower extremity deep venous thrombosis.          --- End of Report ---            ELEAZAR CASTAÑEDA MD; Attending Radiologist  This document has been electronically signed. Dec 17 2021  8:17PM    < end of copied text >  < from: CT Femur No Cont, Left (12.17.21 @ 22:24) >  ACC: [id number] EXAM:  CT FEMUR ONLY LT                          PROCEDURE DATE:  12/17/2021          INTERPRETATION:  CLINICAL INFORMATION: Left leg pain. History of fracture   and osteomyelitis.    TECHNIQUE: CT of the left femur was performed in bone and soft tissue   windows with coronal and sagittal reformats. No intravenous contrast was   administered.    COMPARISON: CT of bilateral femurs from 11/6/2010.    FINDINGS:    Bone: An old healed deformity of the left mid femur is not significantly   changed. An old left femoral intramedullary dominick tract is noted. No acute   fracture or dislocation is demonstrated. Heterotopic ossification   superior to the left greater trochanter is not significantly changed. A   new lobulated 2.2 x 1.8 cm intramedullary defect with tract to the   lateral cortical surface is seen in the mid femur. Degenerative changes   of the left knee is noted.    Soft tissues: Complex heterogeneous lobulated soft tissue measuring 6.9 x   6.9 x 18.3 cm is seen anterior and lateral to the femur involving the   middle two thirds of the femur with mild adjacent inflammatory change.   The vastus intermedialis and lateralis musculature is markedly atrophic.   Vascular calcifications are noted. Bilateral external iliac artery and   left mid to distal femoral artery stents are seen.    IMPRESSION:    Old healed deformity of the mid left femur likely correlating with the   history of a prior osteomyelitis. Complex heterogeneous lobulated soft   tissue anterior and lateral to the middle two thirds of the left femur   concerning for an abscess. New lobulated intramedullary defect with tract   extending to the cortical surface in the mid left femur concerning for a   possible intramedullary abscess. A contrast-enhancedMRI of the left   femur is recommended for further evaluation.    --- End of Report ---            PAZ CHUA MD; Attending Radiologist  This document has been electronically signed. Dec 17 2021 11:20PM    < end of copied text >    EKG:  < from: 12 Lead ECG (12.17.21 @ 16:27) >  Ventricular Rate 97 BPM    Atrial Rate 97 BPM    P-R Interval 114 ms    QRS Duration 80 ms    Q-T Interval 344 ms    QTC Calculation(Bazett) 436 ms    P Axis 59 degrees    R Axis 79 degrees    T Axis 57 degrees    Diagnosis Line Sinus rhythm with premature supraventricular complexes  Otherwise normal ECG  When compared with ECG of 03-SEP-2021 20:01,  premature supraventricular complexes are now present  Confirmed by Regla Figueroa ([id number]) on 12/18/2021 2:58:38 PM    < end of copied text >  < from: TTE with Doppler (w/Cont) (08.03.21 @ 06:53) >    Patient name: YUE COTTO  YOB: 1940   Age: 81 (M)   MR#: [id number]  Study Date: 8/3/2021  [facility]sec[facility] RDCS  Study quality: Technically difficult  Referring Physician: Cecelia Mendenhall MD  BloodPressure: 156/93 mmHg  Height: 180 cm  Weight: 113 kg  BSA: 2.3 m2  Heart Rate: 99 mmHg  ------------------------------------------------------------------------  PROCEDURE: Transthoracic echocardiogram with 2-D, M-Mode  and complete spectral and color flow Doppler. Verbal  consent was obtained for injection of  Ultrasonic Enhancing  Agent following a discussion of risks and benefits.  Following intravenous injection of Ultrasonic Enhancing  Agent , harmonic imaging was performed.  INDICATION: Abnormal electrocardiogram (ECG) (EKG) (R94.31)  ------------------------------------------------------------------------  Dimensions:    Normal Values:  LA:     3.4    2.0 - 4.0 cm  Ao:     3.6    2.0 - 3.8 cm  SEPTUM: 0.7    0.6 - 1.2 cm  PWT:    0.6  0.6 - 1.1 cm  LVIDd:  3.9    3.0 - 5.6 cm  LVIDs:  3.3    1.8 - 4.0 cm  Derived variables:  LVMI: 34 g/m2  RWT: 0.35  Fractional short: 14 %  EF (Visual Estimate): 45-50 %  Doppler Peak Velocity (m/sec): AoV=0.9  ------------------------------------------------------------------------  Observations:  Mitral Valve: Mitral valve not well visualized, probably  normal. Minimal mitral regurgitation.  Aortic Valve/Aorta: Calcified trileaflet aortic valve with  normal opening. Peak transaortic valve gradient equals 4 mm  Hg, estimated aortic valve area equals 3.1 sqcm. No aortic  valve regurgitation seen. Peak left ventricular outflow  tract gradient equals 3 mm Hg.  Aortic Root: 3.6 cm.  Left Atrium: Normal left atrium.  Left Ventricle: Mild segmental left ventricular systolic  dysfunction. The ejection fraction varies with the R-R  interval.  The apical inferior wall, the apical septum, the  apical lateral wall, the basal inferior wall, the mid  anterior wall, and the mid inferior wall are hypokinetic.  Septal motion consistent with cardiac surgery. Normal left  ventricular internal dimensions and wall thicknesses. Mild  diastolic dysfunction (Stage I).  Right Heart: Normal right atrium. Normal right ventricular  size and function. Normal tricuspid valve. Minimal  tricuspid regurgitation. Normal pulmonic valve. No pulmonic  regurgitation.  Pericardium/Pleura: Normal pericardium with no pericardial  effusion.  Hemodynamic: Color Doppler demonstrates no evidence of a  patent foramen ovale.  ------------------------------------------------------------------------  Conclusions:  1. Mitral valve not well visualized, probably normal.  Minimal mitral regurgitation.  2. Calcified trileaflet aortic valve with normal opening.  No aortic valve regurgitation seen.  3. Mild segmental left ventricular systolic dysfunction.  The ejection fraction varies with the R-R interval.  The  apical inferior wall, the apical septum, the apical lateral  wall, the basal inferior wall, the mid anterior wall, and  the mid inferior wall are hypokinetic.  Septal motion  consistent with cardiac surgery.  4. Mild diastolic dysfunction (Stage I).  5. Normal right ventricular size and function.         [facility] -- Saud Aguilar, Génesis Louise, Carroll Haney, [place] Heath  Office # [phone]    Follow Up:    leg pain,   Subjective/Observations:   Patient seen and examined. No complaints of chest pain or shortness of  breath. He is in a pleasant mood, awaiting IR drainage. Plavix is on hold for procedure.    REVIEW OF SYSTEMS: All other review of systems is negative unless indicated above  PAST MEDICAL & SURGICAL HISTORY:  Diabetes Mellitus, Type II  CAD (Coronary Artery Disease)  s/p 3v CABG 2004; stents placed in [place] in 2019  Dyslipidemia  Osteomyelitis  COPD (chronic obstructive pulmonary disease)  on 2L at home and BiPAP at night; intubated 6/18  Hypertension  PVD (peripheral vascular disease)  History of PAT (paroxysmal atrial tachycardia)  CABG (Coronary Artery Bypass Graft)  2004  Compound fracture  left leg  S/P primary angioplasty with coronary stent    MEDICATIONS  (STANDING):  aspirin enteric coated 81 milliGRAM(s) Oral daily  atorvastatin 80 milliGRAM(s) Oral at bedtime  azithromycin   Tablet 250 milliGRAM(s) Oral <User Schedule>  budesonide 160 MICROgram(s)/formoterol 4.5 MICROgram(s) Inhaler 2 Puff(s) Inhalation two times a day  cefepime   IVPB 2000 milliGRAM(s) IV Intermittent every 12 hours  dextrose 40% Gel 15 Gram(s) Oral once  dextrose 5%. 1000 milliLiter(s) (50 mL/Hr) IV Continuous <Continuous>  dextrose 5%. 1000 milliLiter(s) (100 mL/Hr) IV Continuous <Continuous>  dextrose 50% Injectable 25 Gram(s) IV Push once  dextrose 50% Injectable 12.5 Gram(s) IV Push once  dextrose 50% Injectable 25 Gram(s) IV Push once  glucagon  Injectable 1 milliGRAM(s) IntraMuscular once  insulin lispro (ADMELOG) corrective regimen sliding scale   SubCutaneous three times a day before meals  insulin lispro (ADMELOG) corrective regimen sliding scale   SubCutaneous at bedtime  metoprolol tartrate 50 milliGRAM(s) Oral two times a day  predniSONE   Tablet 4 milliGRAM(s) Oral daily  sodium chloride 0.9%. 1000 milliLiter(s) (60 mL/Hr) IV Continuous <Continuous>  tamsulosin 0.4 milliGRAM(s) Oral at bedtime  tiotropium 18 MICROgram(s) Capsule 1 Capsule(s) Inhalation daily  vancomycin  IVPB 1500 milliGRAM(s) IV Intermittent every 24 hours    MEDICATIONS  (PRN):  acetaminophen     Tablet .. 650 milliGRAM(s) Oral every 6 hours PRN Mild Pain (1 - 3), Moderate Pain (4 - 6)  melatonin 5 milliGRAM(s) Oral at bedtime PRN Insomnia    No Known Allergies    Intolerances  shellfish (Nausea)    Vital Signs Last 24 Hrs  T(C): 36.7 (22 Dec 2021 04:53), Max: 36.9 (21 Dec 2021 12:42)  T(F): 98.1 (22 Dec 2021 04:53), Max: 98.5 (21 Dec 2021 12:42)  HR: 96 (22 Dec 2021 04:53) (78 - 96)  BP: 103/67 (22 Dec 2021 04:53) (103/67 - 119/72)  BP(mean): --  RR: 18 (22 Dec 2021 04:53) (17 - 18)  SpO2: 94% (22 Dec 2021 04:53) (93% - 97%)  I&O's Summary    21 Dec 2021 07:01  -  22 Dec 2021 07:00  --------------------------------------------------------  IN: 550 mL / OUT: 0 mL / NET: 550 mL        PHYSICAL EXAM:  TELE: off tele  Constitutional: NAD, awake and alert, well-developed  HEENT: Moist Mucous Membranes, Anicteric  Pulmonary: Non-labored, breath sounds are clear bilaterally, No wheezing, rales or rhonchi  Cardiovascular: Regular, S1 and S2, No murmurs, rubs, gallops or clicks  Gastrointestinal: Bowel Sounds present, soft, nontender.   Lymph: No peripheral edema. No lymphadenopathy.  Skin: No visible rashes or ulcers.  Psych:  Mood & affect appropriate  LABS: All Labs Reviewed:                        11.7   6.97  )-----------( 340      ( 22 Dec 2021 04:56 )             38.5                         12.1   6.98  )-----------( 325      ( 21 Dec 2021 06:47 )             38.8                         11.7   6.53  )-----------( 335      ( 20 Dec 2021 05:37 )             37.8     22 Dec 2021 04:56    141    |  105    |  25     ----------------------------<  160    3.8     |  31     |  1.50   21 Dec 2021 06:47    144    |  108    |  23     ----------------------------<  186    4.3     |  30     |  1.40   20 Dec 2021 05:37    143    |  107    |  23     ----------------------------<  161    4.0     |  32     |  1.40     Ca    10.0       22 Dec 2021 04:56  Ca    9.6        21 Dec 2021 06:47  Ca    9.9        20 Dec 2021 05:37  Phos  3.1       21 Dec 2021 06:47  Mg     2.1       21 Dec 2021 06:47    TPro  7.0    /  Alb  2.6    /  TBili  0.3    /  DBili  x      /  AST  11     /  ALT  11     /  AlkPhos  91     20 Dec 2021 05:37  TPro  6.9    /  Alb  2.5    /  TBili  0.4    /  DBili  x      /  AST  11     /  ALT  9      /  AlkPhos  85     19 Dec 2021 09:06      Chest 1 View AP/PA (12.17.21 @ 17:09) >  ACC: [id number] EXAM:  XR CHEST AP OR PA 1V                          PROCEDURE DATE:  12/17/2021          INTERPRETATION:  Evaluate for pneumonia    AP chest. Prior 9/3/2021.    Median sternotomy. Normal heart mediastinum. Hyperinflated lungs. No   consolidation or effusion.    IMPRESSION: Hyperinflated lungs. No active infiltrates    --- End of Report ---            RODRIGO FAITH MD; Attending Radiologist  This document has been electronically signed. Dec 18 2021  9:39AM    < end of copied text >  < from: US Duplex Venous Lower Ext Ltd, Left (12.17.21 @ 19:56) >  ACC: [id number] EXAM:  US DPLX LWR EXT VEINS LTD LT                          PROCEDURE DATE:  12/17/2021          INTERPRETATION:  CLINICAL INFORMATION: Left leg pain. Evaluate for DVT.    COMPARISON: 1/17/2020.    TECHNIQUE: Duplex sonography of theLEFT LOWER extremity veins with color   and spectral Doppler, with and without compression.    FINDINGS:    There is normal compressibility of the left common femoral, femoral and   popliteal veins.  The contralateral common femoral vein is patent.  Doppler examination shows normal spontaneous and phasic flow.    No calf vein thrombosis is detected.    IMPRESSION:  No evidence of left lower extremity deep venous thrombosis.          --- End of Report ---            ELEAZAR CASTAÑEDA MD; Attending Radiologist  This document has been electronically signed. Dec 17 2021  8:17PM    < end of copied text >  < from: CT Femur No Cont, Left (12.17.21 @ 22:24) >  ACC: [id number] EXAM:  CT FEMUR ONLY LT                          PROCEDURE DATE:  12/17/2021          INTERPRETATION:  CLINICAL INFORMATION: Left leg pain. History of fracture   and osteomyelitis.    TECHNIQUE: CT of the left femur was performed in bone and soft tissue   windows with coronal and sagittal reformats. No intravenous contrast was   administered.    COMPARISON: CT of bilateral femurs from 11/6/2010.    FINDINGS:    Bone: An old healed deformity of the left mid femur is not significantly   changed. An old left femoral intramedullary dominick tract is noted. No acute   fracture or dislocation is demonstrated. Heterotopic ossification   superior to the left greater trochanter is not significantly changed. A   new lobulated 2.2 x 1.8 cm intramedullary defect with tract to the   lateral cortical surface is seen in the mid femur. Degenerative changes   of the left knee is noted.    Soft tissues: Complex heterogeneous lobulated soft tissue measuring 6.9 x   6.9 x 18.3 cm is seen anterior and lateral to the femur involving the   middle two thirds of the femur with mild adjacent inflammatory change.   The vastus intermedialis and lateralis musculature is markedly atrophic.   Vascular calcifications are noted. Bilateral external iliac artery and   left mid to distal femoral artery stents are seen.    IMPRESSION:    Old healed deformity of the mid left femur likely correlating with the   history of a prior osteomyelitis. Complex heterogeneous lobulated soft   tissue anterior and lateral to the middle two thirds of the left femur   concerning for an abscess. New lobulated intramedullary defect with tract   extending to the cortical surface in the mid left femur concerning for a   possible intramedullary abscess. A contrast-enhancedMRI of the left   femur is recommended for further evaluation.    --- End of Report ---            PAZ CHUA MD; Attending Radiologist  This document has been electronically signed. Dec 17 2021 11:20PM    < end of copied text >  < from: MR Femur w/wo IV Cont, Left (12.20.21 @ 12:38) >  Impression:    Multiloculated rim-enhancing abscess along the anterior and lateral   aspect of the mid to distal femur. Given the thick rim of enhancement,   recommend follow-up MRI with and without contrast after therapy to rule   out an underlying mass/neoplasm.    Posttraumatic deformity of the mid to distal femur with findings most   compatible with chronic osteomyelitis of the femur subjacent to the   abscess.    Probable intraosseous abscess within the mid to proximal diaphysis of the   femur near the superior aspect of the abscess with probable areas of   sequestrum, involucrum, and cloaca formation when correlated with recent   CT.    --- End of Repo    < end of copied text >    EKG:  < from: 12 Lead ECG (12.17.21 @ 16:27) >  Ventricular Rate 97 BPM    Atrial Rate 97 BPM    P-R Interval 114 ms    QRS Duration 80 ms    Q-T Interval 344 ms    QTC Calculation(Bazett) 436 ms    P Axis 59 degrees    R Axis 79 degrees    T Axis 57 degrees    Diagnosis Line Sinus rhythm with premature supraventricular complexes  Otherwise normal ECG  When compared with ECG of 03-SEP-2021 20:01,  premature supraventricular complexes are now present  Confirmed by Regla Figueroa ([id number]) on 12/18/2021 2:58:38 PM    < end of copied text >  < from: TTE with Doppler (w/Cont) (08.03.21 @ 06:53) >    Patient name: YUE COTTO  YOB: 1940   Age: 81 (M)   MR#: [id number]  Study Date: 8/3/2021  [facility]CS  Study quality: Technically difficult  Referring Physician: Cecelia Mendenhall MD  BloodPressure: 156/93 mmHg  Height: 180 cm  Weight: 113 kg  BSA: 2.3 m2  Heart Rate: 99 mmHg  ------------------------------------------------------------------------  PROCEDURE: Transthoracic echocardiogram with 2-D, M-Mode  and complete spectral and color flow Doppler. Verbal  consent was obtained for injection of  Ultrasonic Enhancing  Agent following a discussion of risks and benefits.  Following intravenous injection of Ultrasonic Enhancing  Agent , harmonic imaging was performed.  INDICATION: Abnormal electrocardiogram (ECG) (EKG) (R94.31)  ------------------------------------------------------------------------  Dimensions:    Normal Values:  LA:     3.4    2.0 - 4.0 cm  Ao:     3.6    2.0 - 3.8 cm  SEPTUM: 0.7    0.6 - 1.2 cm  PWT:    0.6  0.6 - 1.1 cm  LVIDd:  3.9    3.0 - 5.6 cm  LVIDs:  3.3    1.8 - 4.0 cm  Derived variables:  LVMI: 34 g/m2  RWT: 0.35  Fractional short: 14 %  EF (Visual Estimate): 45-50 %  Doppler Peak Velocity (m/sec): AoV=0.9  ------------------------------------------------------------------------  Observations:  Mitral Valve: Mitral valve not well visualized, probably  normal. Minimal mitral regurgitation.  Aortic Valve/Aorta: Calcified trileaflet aortic valve with  normal opening. Peak transaortic valve gradient equals 4 mm  Hg, estimated aortic valve area equals 3.1 sqcm. No aortic  valve regurgitation seen. Peak left ventricular outflow  tract gradient equals 3 mm Hg.  Aortic Root: 3.6 cm.  Left Atrium: Normal left atrium.  Left Ventricle: Mild segmental left ventricular systolic  dysfunction. The ejection fraction varies with the R-R  interval.  The apical inferior wall, the apical septum, the  apical lateral wall, the basal inferior wall, the mid  anterior wall, and the mid inferior wall are hypokinetic.  Septal motion consistent with cardiac surgery. Normal left  ventricular internal dimensions and wall thicknesses. Mild  diastolic dysfunction (Stage I).  Right Heart: Normal right atrium. Normal right ventricular  size and function. Normal tricuspid valve. Minimal  tricuspid regurgitation. Normal pulmonic valve. No pulmonic  regurgitation.  Pericardium/Pleura: Normal pericardium with no pericardial  effusion.  Hemodynamic: Color Doppler demonstrates no evidence of a  patent foramen ovale.  ------------------------------------------------------------------------  Conclusions:  1. Mitral valve not well visualized, probably normal.  Minimal mitral regurgitation.  2. Calcified trileaflet aortic valve with normal opening.  No aortic valve regurgitation seen.  3. Mild segmental left ventricular systolic dysfunction.  The ejection fraction varies with the R-R interval.  The  apical inferior wall, the apical septum, the apical lateral  wall, the basal inferior wall, the mid anterior wall, and  the mid inferior wall are hypokinetic.  Septal motion  consistent with cardiac surgery.  4. Mild diastolic dysfunction (Stage I).  5. Normal right ventricular size and function.

## 2021-12-22 NOTE — PROGRESS NOTE ADULT - ASSESSMENT
COMMODORE TURNER 81 m 2021 [facility] P [phone]  1940  VERNON ESPINOZA    REVIEW OF SYMPTOMS      Able to give ROS  Yes     RELIABLE +/-   CONSTITUTIONAL Weakness Yes  Chills No   ENDOCRINE  No heat or cold intolerance    ALLERGY No hives  Sore throat No stridor  RESP Coughing blood no  Shortness of breath YES   NEURO No Headache  Confusion Pain neck No   CARDIAC No Chest pain No Palpitations   GI  Pain abdomen NO   Vomiting NO     PHYSICAL EXAM    HEENT Unremarkable  atraumatic   RESP Fair air entry EXP prolonged    Harsh breath sound Resp distres mild   CARDIAC S1 S2 No S3     NO JVD    ABDOMEN SOFT BS PRESENT NOT DISTENDED No hepatosplenomegaly PEDAL EDEMA present No calf tenderness  NO rash       ________________  PATIENT PRESENTATION.   82yo man with PMH of HTN, COPD on home O2, CAD s/p CABG, PVD s/p stents in b/l legs and left femoral fracture more than 50 years ago, was admitted 2021 with left leg pain on the site of old fracture after CT showed possible intramedullary abscess. He has had pain and infection in the old fracture area at least 3-4 times in the past, had multiple surgeries and drainage of area with a long term antibiotic treatment, last one years ago.   Most likely he is here now  with another infection and osteomyelitis with collection in area that needs intervention by ortho. Is on coverage empirically with 4th Gen cephalosporins and vancomycin until  culture info.   Doppler neg for DVTs  Admission WBC normal, no fever  ESR=60     CRP=70  Pt admitted  Pulm consulted     ________________  HOSPITAL COURSE.   OM Femur   Cefepime 2.2    Vanco 1.5/d   COPD on home O2  NOCT BPAP   RESP FAILURE    COPD ASTHMA Former smoker 2019 FVC 2.20 58% Post 2.61 69% +18 FEV1   0.86 31% Post 0.89 32% +4%  FEV1% 39%     INTUBATION CAC 2018   CAD sp cabg pmh  PVD stents pmh   l FEM FX YR AGO With local episodic infectn    _____                            GOC. 2021 Full code   COVID STATUS.   ICU STAY. none  ABIO.    AZITHRO q MWF    Cefepime 2.2    Vanco 1.5/d       BEST PRACTICE ISSUES.                                                  HEAD OF BED ELEVATION. Yes  DVT PROPHYLAXIS.    2021 apixaba 5.2 (a f)                                      MCCORMICK PROPHYLAXIS.                                                                                         DIET.     cons carb                         PATIENT DATA   VITALS/PO/IO/VENT/DRIPS.   2021 afeb 96 100/67   2021 118/8/       ASSESSMENT/RECOMMENDATIONS.    RESP.   is on noct bpap    HEMODYNAMICS.   bp ok    VANCO t   --2021 VT 11.7-13 - 15     INFECTION.   Possible osteomyelitis   w -2021 w 6.5 - 6.9   mr femur with contr   Multiloculated nm enhancing abscess along ant and lateral aspect of mid to distal femur cw osteomyelitis   blod c  (-)    AZITHRO q MWF    Cefepime 2.2    Vanco 1.5/d       COPD   symbicort 160    pred 4    symbicor     CAD.   metoprolol 50.2    asa 81     ANEMIA  Hb 2021 Hb 11   Monitor     CKD  Cr -2021 Cr 1.4- 1.5  Monitor     TIME SPENT   Over 25 minutes aggregate care time spent on encounter; activities included   direct patient care, counseling and/or coordinating care reviewing notes, lab data/ imaging , discussion with multidisciplinary team/ patient  /family and explaining in detail risks, benefits, alternatives  of the recommendations     COMMODORE TURNER 81 m 2021 [facility] P [phone]  1940  VERNON ESPINOZA

## 2021-12-22 NOTE — PROGRESS NOTE ADULT - PROBLEM SELECTOR PLAN 3
- CKD 3, Baseline Cr 1.2 - 1.5 as per nephro  - Cr on admission 1.60  - Cr 1.50 today  - Continue fluids  - Hold home losartan  - DASH diet  - hold all nephrotoxic agents  - trend CMP

## 2021-12-23 DIAGNOSIS — N18.30 CHRONIC KIDNEY DISEASE, STAGE 3 UNSPECIFIED: ICD-10-CM

## 2021-12-23 LAB
ALBUMIN SERPL ELPH-MCNC: 2.7 G/DL — LOW (ref 3.3–5)
ALP SERPL-CCNC: 91 U/L — SIGNIFICANT CHANGE UP (ref 40–120)
ALT FLD-CCNC: 14 U/L — SIGNIFICANT CHANGE UP (ref 12–78)
ANION GAP SERPL CALC-SCNC: 6 MMOL/L — SIGNIFICANT CHANGE UP (ref 5–17)
AST SERPL-CCNC: 13 U/L — LOW (ref 15–37)
BASOPHILS # BLD AUTO: 0.02 K/UL — SIGNIFICANT CHANGE UP (ref 0–0.2)
BASOPHILS NFR BLD AUTO: 0.3 % — SIGNIFICANT CHANGE UP (ref 0–2)
BILIRUB SERPL-MCNC: 0.4 MG/DL — SIGNIFICANT CHANGE UP (ref 0.2–1.2)
BUN SERPL-MCNC: 25 MG/DL — HIGH (ref 7–23)
CALCIUM SERPL-MCNC: 10 MG/DL — SIGNIFICANT CHANGE UP (ref 8.5–10.1)
CHLORIDE SERPL-SCNC: 103 MMOL/L — SIGNIFICANT CHANGE UP (ref 96–108)
CO2 SERPL-SCNC: 31 MMOL/L — SIGNIFICANT CHANGE UP (ref 22–31)
CREAT SERPL-MCNC: 1.4 MG/DL — HIGH (ref 0.5–1.3)
EOSINOPHIL # BLD AUTO: 0.03 K/UL — SIGNIFICANT CHANGE UP (ref 0–0.5)
EOSINOPHIL NFR BLD AUTO: 0.4 % — SIGNIFICANT CHANGE UP (ref 0–6)
GLUCOSE SERPL-MCNC: 178 MG/DL — HIGH (ref 70–99)
HCT VFR BLD CALC: 39.2 % — SIGNIFICANT CHANGE UP (ref 39–50)
HGB BLD-MCNC: 12.2 G/DL — LOW (ref 13–17)
IMM GRANULOCYTES NFR BLD AUTO: 0.8 % — SIGNIFICANT CHANGE UP (ref 0–1.5)
LYMPHOCYTES # BLD AUTO: 1.32 K/UL — SIGNIFICANT CHANGE UP (ref 1–3.3)
LYMPHOCYTES # BLD AUTO: 17.8 % — SIGNIFICANT CHANGE UP (ref 13–44)
MCHC RBC-ENTMCNC: 26.8 PG — LOW (ref 27–34)
MCHC RBC-ENTMCNC: 31.1 GM/DL — LOW (ref 32–36)
MCV RBC AUTO: 86.2 FL — SIGNIFICANT CHANGE UP (ref 80–100)
MONOCYTES # BLD AUTO: 0.92 K/UL — HIGH (ref 0–0.9)
MONOCYTES NFR BLD AUTO: 12.4 % — SIGNIFICANT CHANGE UP (ref 2–14)
NEUTROPHILS # BLD AUTO: 5.06 K/UL — SIGNIFICANT CHANGE UP (ref 1.8–7.4)
NEUTROPHILS NFR BLD AUTO: 68.3 % — SIGNIFICANT CHANGE UP (ref 43–77)
NRBC # BLD: 0 /100 WBCS — SIGNIFICANT CHANGE UP (ref 0–0)
PLATELET # BLD AUTO: 351 K/UL — SIGNIFICANT CHANGE UP (ref 150–400)
POTASSIUM SERPL-MCNC: 4 MMOL/L — SIGNIFICANT CHANGE UP (ref 3.5–5.3)
POTASSIUM SERPL-SCNC: 4 MMOL/L — SIGNIFICANT CHANGE UP (ref 3.5–5.3)
PROT SERPL-MCNC: 6.7 G/DL — SIGNIFICANT CHANGE UP (ref 6–8.3)
RBC # BLD: 4.55 M/UL — SIGNIFICANT CHANGE UP (ref 4.2–5.8)
RBC # FLD: 13.2 % — SIGNIFICANT CHANGE UP (ref 10.3–14.5)
SODIUM SERPL-SCNC: 140 MMOL/L — SIGNIFICANT CHANGE UP (ref 135–145)
VANCOMYCIN TROUGH SERPL-MCNC: 15.4 UG/ML — SIGNIFICANT CHANGE UP (ref 10–20)
WBC # BLD: 7.41 K/UL — SIGNIFICANT CHANGE UP (ref 3.8–10.5)
WBC # FLD AUTO: 7.41 K/UL — SIGNIFICANT CHANGE UP (ref 3.8–10.5)

## 2021-12-23 PROCEDURE — 99232 SBSQ HOSP IP/OBS MODERATE 35: CPT

## 2021-12-23 PROCEDURE — 99233 SBSQ HOSP IP/OBS HIGH 50: CPT

## 2021-12-23 RX ORDER — INSULIN GLARGINE 100 [IU]/ML
5 INJECTION, SOLUTION SUBCUTANEOUS AT BEDTIME
Refills: 0 | Status: DISCONTINUED | OUTPATIENT
Start: 2021-12-23 | End: 2021-12-24

## 2021-12-23 RX ADMIN — BUDESONIDE AND FORMOTEROL FUMARATE DIHYDRATE 2 PUFF(S): 160; 4.5 AEROSOL RESPIRATORY (INHALATION) at 07:22

## 2021-12-23 RX ADMIN — Medication 5 MILLIGRAM(S): at 22:09

## 2021-12-23 RX ADMIN — Medication 50 MILLIGRAM(S): at 06:01

## 2021-12-23 RX ADMIN — CEFEPIME 100 MILLIGRAM(S): 1 INJECTION, POWDER, FOR SOLUTION INTRAMUSCULAR; INTRAVENOUS at 17:36

## 2021-12-23 RX ADMIN — Medication 650 MILLIGRAM(S): at 23:00

## 2021-12-23 RX ADMIN — Medication 4: at 08:38

## 2021-12-23 RX ADMIN — TAMSULOSIN HYDROCHLORIDE 0.4 MILLIGRAM(S): 0.4 CAPSULE ORAL at 21:37

## 2021-12-23 RX ADMIN — Medication 81 MILLIGRAM(S): at 12:01

## 2021-12-23 RX ADMIN — Medication 650 MILLIGRAM(S): at 22:09

## 2021-12-23 RX ADMIN — TIOTROPIUM BROMIDE 1 CAPSULE(S): 18 CAPSULE ORAL; RESPIRATORY (INHALATION) at 07:22

## 2021-12-23 RX ADMIN — APIXABAN 5 MILLIGRAM(S): 2.5 TABLET, FILM COATED ORAL at 06:01

## 2021-12-23 RX ADMIN — ATORVASTATIN CALCIUM 80 MILLIGRAM(S): 80 TABLET, FILM COATED ORAL at 21:37

## 2021-12-23 RX ADMIN — INSULIN GLARGINE 5 UNIT(S): 100 INJECTION, SOLUTION SUBCUTANEOUS at 22:09

## 2021-12-23 RX ADMIN — Medication 300 MILLIGRAM(S): at 07:22

## 2021-12-23 RX ADMIN — Medication 650 MILLIGRAM(S): at 06:14

## 2021-12-23 RX ADMIN — CEFEPIME 100 MILLIGRAM(S): 1 INJECTION, POWDER, FOR SOLUTION INTRAMUSCULAR; INTRAVENOUS at 06:01

## 2021-12-23 RX ADMIN — BUDESONIDE AND FORMOTEROL FUMARATE DIHYDRATE 2 PUFF(S): 160; 4.5 AEROSOL RESPIRATORY (INHALATION) at 17:42

## 2021-12-23 RX ADMIN — Medication 4 MILLIGRAM(S): at 06:01

## 2021-12-23 RX ADMIN — Medication 2: at 17:26

## 2021-12-23 RX ADMIN — Medication 8: at 12:01

## 2021-12-23 RX ADMIN — Medication 650 MILLIGRAM(S): at 07:14

## 2021-12-23 RX ADMIN — APIXABAN 5 MILLIGRAM(S): 2.5 TABLET, FILM COATED ORAL at 17:26

## 2021-12-23 NOTE — PROGRESS NOTE ADULT - SUBJECTIVE AND OBJECTIVE BOX
Patient is a 81y old  Male who presents with a chief complaint of increasing left leg pain (22 Dec 2021 08:10)    Subjective:  INTERVAL HPI/OVERNIGHT EVENTS: Patient seen and examined at bedside. No overnight events occurred. Patient has no complaints. Tolerating PO, no bowel movements and decline stool softeners. Passing gas, and does not have pain or abdominal discomfort.  Denies fevers, chills, headache, lightheadedness, chest pain, dyspnea, leg pain.     MEDICATIONS  (STANDING):  apixaban 5 milliGRAM(s) Oral every 12 hours  aspirin enteric coated 81 milliGRAM(s) Oral daily  atorvastatin 80 milliGRAM(s) Oral at bedtime  azithromycin   Tablet 250 milliGRAM(s) Oral <User Schedule>  budesonide 160 MICROgram(s)/formoterol 4.5 MICROgram(s) Inhaler 2 Puff(s) Inhalation two times a day  cefepime   IVPB 2000 milliGRAM(s) IV Intermittent every 12 hours  dextrose 40% Gel 15 Gram(s) Oral once  dextrose 5%. 1000 milliLiter(s) (50 mL/Hr) IV Continuous <Continuous>  dextrose 5%. 1000 milliLiter(s) (100 mL/Hr) IV Continuous <Continuous>  dextrose 50% Injectable 25 Gram(s) IV Push once  dextrose 50% Injectable 12.5 Gram(s) IV Push once  dextrose 50% Injectable 25 Gram(s) IV Push once  glucagon  Injectable 1 milliGRAM(s) IntraMuscular once  insulin glargine Injectable (LANTUS) 5 Unit(s) SubCutaneous at bedtime  insulin lispro (ADMELOG) corrective regimen sliding scale   SubCutaneous three times a day before meals  insulin lispro (ADMELOG) corrective regimen sliding scale   SubCutaneous at bedtime  metoprolol tartrate 50 milliGRAM(s) Oral two times a day  predniSONE   Tablet 4 milliGRAM(s) Oral daily  sodium chloride 0.9%. 1000 milliLiter(s) (60 mL/Hr) IV Continuous <Continuous>  tamsulosin 0.4 milliGRAM(s) Oral at bedtime  tiotropium 18 MICROgram(s) Capsule 1 Capsule(s) Inhalation daily  vancomycin  IVPB 1500 milliGRAM(s) IV Intermittent every 24 hours    MEDICATIONS  (PRN):  acetaminophen     Tablet .. 650 milliGRAM(s) Oral every 6 hours PRN Mild Pain (1 - 3), Moderate Pain (4 - 6)  melatonin 5 milliGRAM(s) Oral at bedtime PRN Insomnia      Allergies  No Known Allergies    Intolerances    shellfish (Nausea)    REVIEW OF SYSTEMS:  CONSTITUTIONAL: No fever or chills  HEENT:  No headache, no sore throat  RESPIRATORY: No cough, wheezing, or shortness of breath  CARDIOVASCULAR: No chest pain, palpitations  GASTROINTESTINAL: No abd pain, nausea, vomiting, or diarrhea. + constipation.   GENITOURINARY: No dysuria, frequency, or hematuria  NEUROLOGICAL: no focal weakness or dizziness  MUSCULOSKELETAL: no myalgias, no LLE pain.     Objective:  Vital Signs Last 24 Hrs  T(C): 36.9 (23 Dec 2021 12:05), Max: 37.1 (22 Dec 2021 21:21)  T(F): 98.4 (23 Dec 2021 12:05), Max: 98.8 (22 Dec 2021 21:21)  HR: 95 (23 Dec 2021 12:05) (81 - 106)  BP: 118/76 (23 Dec 2021 12:05) (115/77 - 119/77)  RR: 18 (23 Dec 2021 12:05) (18 - 20)  SpO2: 95% (23 Dec 2021 12:05) (92% - 96%)    GENERAL: NAD, lying in bed comfortably  HEAD:  Atraumatic  EYES: EOMI, PERRLA, conjunctiva and sclera clear  ENT: Moist mucous membranes  NECK: Supple, No JVD  CHEST/LUNG: Clear to auscultation bilaterally. No rales, rhonchi, wheezing,. Unlabored respirations  HEART: Regular rate and rhythm. No murmurs, rubs, or gallops  ABDOMEN: Bowel sounds present. Soft, Nontender, Nondistended.  EXTREMITIES: 2+ Peripheral Pulses. No clubbing, cyanosis, or edema. No pain, tenderness in LLE, full ROM.   NERVOUS SYSTEM:  Alert & Oriented X3, speech clear. No acute deficits   SKIN: scar in distal lateral thigh    LABS:                        12.2   7.41  )-----------( 351      ( 23 Dec 2021 07:11 )             39.2     CBC Full  -  ( 23 Dec 2021 07:11 )  WBC Count : 7.41 K/uL  Hemoglobin : 12.2 g/dL  Hematocrit : 39.2 %  Platelet Count - Automated : 351 K/uL  Mean Cell Volume : 86.2 fl  Mean Cell Hemoglobin : 26.8 pg  Mean Cell Hemoglobin Concentration : 31.1 gm/dL  Auto Neutrophil # : 5.06 K/uL  Auto Lymphocyte # : 1.32 K/uL  Auto Monocyte # : 0.92 K/uL  Auto Eosinophil # : 0.03 K/uL  Auto Basophil # : 0.02 K/uL  Auto Neutrophil % : 68.3 %  Auto Lymphocyte % : 17.8 %  Auto Monocyte % : 12.4 %  Auto Eosinophil % : 0.4 %  Auto Basophil % : 0.3 %    23 Dec 2021 07:11    140    |  103    |  25     ----------------------------<  178    4.0     |  31     |  1.40     Ca    10.0       23 Dec 2021 07:11    TPro  6.7    /  Alb  2.7    /  TBili  0.4    /  DBili  x      /  AST  13     /  ALT  14     /  AlkPhos  91     23 Dec 2021 07:11        CAPILLARY BLOOD GLUCOSE  POCT Blood Glucose.: 312 mg/dL (23 Dec 2021 11:43)  POCT Blood Glucose.: 220 mg/dL (23 Dec 2021 07:48)  POCT Blood Glucose.: 261 mg/dL (22 Dec 2021 21:44)  POCT Blood Glucose.: 174 mg/dL (22 Dec 2021 16:51)        Culture - Blood (collected 12-17-21 @ 21:22)  Source: .Blood Blood  Final Report (12-22-21 @ 22:01):    No Growth Final    Culture - Blood (collected 12-17-21 @ 21:22)  Source: .Blood Blood  Final Report (12-22-21 @ 22:01):    No Growth Final        Personally reviewed.     Consultant(s) Notes Reviewed:  [x] YES  [ ] NO     Patient is a 81y old  Male who presents with a chief complaint of increasing left leg pain (22 Dec 2021 08:10)    Intramedullary abscess in left femur possible   ir  guided  drain   INTERVAL HPI/OVERNIGHT EVENTS: Patient seen and examined at bedside. No overnight events occurred. Patient has no complaints. Tolerating PO, no bowel movements and decline stool softeners. Passing gas, and does not have pain or abdominal discomfort.  Denies fevers, chills, headache, lightheadedness, chest pain, dyspnea, leg pain.             REVIEW OF SYSTEMS:  CONSTITUTIONAL: No fever or chills  HEENT:  No headache, no sore throat  RESPIRATORY: No cough, wheezing, or shortness of breath  CARDIOVASCULAR: No chest pain, palpitations  GASTROINTESTINAL: No abd pain, nausea, vomiting, or diarrhea. + constipation.   GENITOURINARY: No dysuria, frequency, or hematuria  NEUROLOGICAL: no focal weakness or dizziness  MUSCULOSKELETAL: no myalgias, no LLE pain.     Objective:  Vital Signs Last 24 Hrs  T(C): 36.9 (23 Dec 2021 12:05), Max: 37.1 (22 Dec 2021 21:21)  T(F): 98.4 (23 Dec 2021 12:05), Max: 98.8 (22 Dec 2021 21:21)  HR: 95 (23 Dec 2021 12:05) (81 - 106)  BP: 118/76 (23 Dec 2021 12:05) (115/77 - 119/77)  RR: 18 (23 Dec 2021 12:05) (18 - 20)  SpO2: 95% (23 Dec 2021 12:05) (92% - 96%)    GENERAL: NAD, lying in bed comfortably  HEAD:  Atraumatic  EYES: EOMI, PERRLA, conjunctiva and sclera clear  ENT: Moist mucous membranes  NECK: Supple, No JVD  CHEST/LUNG:  auscultation bilaterally. No rales, rhonchi, wheezing,.   HEART: Regular rate and rhythm. No murmurs, no tachy   ABDOMEN: Bowel sounds present. Soft, Nontender, Nondistended.  EXTREMITIES: 2+ Peripheral Pulses. No clubbing, cyanosis, or edema. No pain, tenderness in LLE, full ROM.   NERVOUS SYSTEM:  Alert & Oriented X3, speech clear. No acute deficits   SKIN: scar in distal lateral thigh  MEDICATIONS  (STANDING):  apixaban 5 milliGRAM(s) Oral every 12 hours  aspirin enteric coated 81 milliGRAM(s) Oral daily  atorvastatin 80 milliGRAM(s) Oral at bedtime  azithromycin   Tablet 250 milliGRAM(s) Oral <User Schedule>  budesonide 160 MICROgram(s)/formoterol 4.5 MICROgram(s) Inhaler 2 Puff(s) Inhalation two times a day  cefepime   IVPB 2000 milliGRAM(s) IV Intermittent every 12 hours  dextrose 40% Gel 15 Gram(s) Oral once  dextrose 5%. 1000 milliLiter(s) (50 mL/Hr) IV Continuous <Continuous>  dextrose 5%. 1000 milliLiter(s) (100 mL/Hr) IV Continuous <Continuous>  dextrose 50% Injectable 25 Gram(s) IV Push once  dextrose 50% Injectable 12.5 Gram(s) IV Push once  dextrose 50% Injectable 25 Gram(s) IV Push once  glucagon  Injectable 1 milliGRAM(s) IntraMuscular once  insulin glargine Injectable (LANTUS) 5 Unit(s) SubCutaneous at bedtime  insulin lispro (ADMELOG) corrective regimen sliding scale   SubCutaneous three times a day before meals  insulin lispro (ADMELOG) corrective regimen sliding scale   SubCutaneous at bedtime  metoprolol tartrate 50 milliGRAM(s) Oral two times a day  predniSONE   Tablet 4 milliGRAM(s) Oral daily  sodium chloride 0.9%. 1000 milliLiter(s) (60 mL/Hr) IV Continuous <Continuous>  tamsulosin 0.4 milliGRAM(s) Oral at bedtime  tiotropium 18 MICROgram(s) Capsule 1 Capsule(s) Inhalation daily  vancomycin  IVPB 1500 milliGRAM(s) IV Intermittent every 24 hours    MEDICATIONS  (PRN):  acetaminophen     Tablet .. 650 milliGRAM(s) Oral every 6 hours PRN Mild Pain (1 - 3), Moderate Pain (4 - 6)  melatonin 5 milliGRAM(s) Oral at bedtime PRN Insomnia  LABS:                        12.2   7.41  )-----------( 351      ( 23 Dec 2021 07:11 )             39.2     CBC Full  -  ( 23 Dec 2021 07:11 )  WBC Count : 7.41 K/uL  Hemoglobin : 12.2 g/dL  Hematocrit : 39.2 %  Platelet Count - Automated : 351 K/uL  Mean Cell Volume : 86.2 fl  Mean Cell Hemoglobin : 26.8 pg  Mean Cell Hemoglobin Concentration : 31.1 gm/dL  Auto Neutrophil # : 5.06 K/uL  Auto Lymphocyte # : 1.32 K/uL  Auto Monocyte # : 0.92 K/uL  Auto Eosinophil # : 0.03 K/uL  Auto Basophil # : 0.02 K/uL  Auto Neutrophil % : 68.3 %  Auto Lymphocyte % : 17.8 %  Auto Monocyte % : 12.4 %  Auto Eosinophil % : 0.4 %  Auto Basophil % : 0.3 %    23 Dec 2021 07:11    140    |  103    |  25     ----------------------------<  178    4.0     |  31     |  1.40     Ca    10.0       23 Dec 2021 07:11    TPro  6.7    /  Alb  2.7    /  TBili  0.4    /  DBili  x      /  AST  13     /  ALT  14     /  AlkPhos  91     23 Dec 2021 07:11        CAPILLARY BLOOD GLUCOSE  POCT Blood Glucose.: 312 mg/dL (23 Dec 2021 11:43)  POCT Blood Glucose.: 220 mg/dL (23 Dec 2021 07:48)  POCT Blood Glucose.: 261 mg/dL (22 Dec 2021 21:44)  POCT Blood Glucose.: 174 mg/dL (22 Dec 2021 16:51)        Culture - Blood (collected 12-17-21 @ 21:22)  Source: .Blood Blood  Final Report (12-22-21 @ 22:01):    No Growth Final    Culture - Blood (collected 12-17-21 @ 21:22)  Source: .Blood Blood  Final Report (12-22-21 @ 22:01):    No Growth Final        Personally reviewed.     Consultant(s) Notes Reviewed:  [x] YES  [ ] NO     Patient is a 81y old  Male who presents with a chief complaint of increasing left leg pain (22 Dec 2021 08:10)    Intramedullary abscess in left femur possible   ir  guided  drain   INTERVAL HPI/OVERNIGHT EVENTS: Patient seen and examined at bedside. No overnight events occurred. Patient has no complaints. Tolerating PO, no bowel movements and decline stool softeners. Passing gas, and does not have pain or abdominal discomfort.  Denies fevers, chills, headache, lightheadedness, chest pain, dyspnea, leg pain.     REVIEW OF SYSTEMS:  CONSTITUTIONAL: No fever or chills  HEENT:  No headache, no sore throat  RESPIRATORY: No cough, wheezing, or shortness of breath  CARDIOVASCULAR: No chest pain, palpitations  GASTROINTESTINAL: No abd pain, nausea, vomiting, or diarrhea. + constipation.   GENITOURINARY: No dysuria, frequency, or hematuria  NEUROLOGICAL: no focal weakness or dizziness  MUSCULOSKELETAL: no myalgias, no LLE pain.     Objective:  Vital Signs Last 24 Hrs  T(C): 36.9 (23 Dec 2021 12:05), Max: 37.1 (22 Dec 2021 21:21)  T(F): 98.4 (23 Dec 2021 12:05), Max: 98.8 (22 Dec 2021 21:21)  HR: 95 (23 Dec 2021 12:05) (81 - 106)  BP: 118/76 (23 Dec 2021 12:05) (115/77 - 119/77)  RR: 18 (23 Dec 2021 12:05) (18 - 20)  SpO2: 95% (23 Dec 2021 12:05) (92% - 96%)    GENERAL: NAD, lying in bed comfortably  HEAD:  Atraumatic  EYES: EOMI, PERRLA, conjunctiva and sclera clear  ENT: Moist mucous membranes  NECK: Supple, No JVD  CHEST/LUNG:  auscultation bilaterally. No rales, rhonchi, wheezing,.   HEART: Regular rate and rhythm. No murmurs, no tachy   ABDOMEN: Bowel sounds present. Soft, Nontender, Nondistended.  EXTREMITIES: 2+ Peripheral Pulses. No clubbing, cyanosis, or edema. No pain, tenderness in LLE, full ROM.   NERVOUS SYSTEM:  Alert & Oriented X3, speech clear. No acute deficits   SKIN: scar in distal lateral thigh    MEDICATIONS  (STANDING):  apixaban 5 milliGRAM(s) Oral every 12 hours  aspirin enteric coated 81 milliGRAM(s) Oral daily  atorvastatin 80 milliGRAM(s) Oral at bedtime  azithromycin   Tablet 250 milliGRAM(s) Oral <User Schedule>  budesonide 160 MICROgram(s)/formoterol 4.5 MICROgram(s) Inhaler 2 Puff(s) Inhalation two times a day  cefepime   IVPB 2000 milliGRAM(s) IV Intermittent every 12 hours  dextrose 40% Gel 15 Gram(s) Oral once  dextrose 5%. 1000 milliLiter(s) (50 mL/Hr) IV Continuous <Continuous>  dextrose 5%. 1000 milliLiter(s) (100 mL/Hr) IV Continuous <Continuous>  dextrose 50% Injectable 25 Gram(s) IV Push once  dextrose 50% Injectable 12.5 Gram(s) IV Push once  dextrose 50% Injectable 25 Gram(s) IV Push once  glucagon  Injectable 1 milliGRAM(s) IntraMuscular once  insulin glargine Injectable (LANTUS) 5 Unit(s) SubCutaneous at bedtime  insulin lispro (ADMELOG) corrective regimen sliding scale   SubCutaneous three times a day before meals  insulin lispro (ADMELOG) corrective regimen sliding scale   SubCutaneous at bedtime  metoprolol tartrate 50 milliGRAM(s) Oral two times a day  predniSONE   Tablet 4 milliGRAM(s) Oral daily  sodium chloride 0.9%. 1000 milliLiter(s) (60 mL/Hr) IV Continuous <Continuous>  tamsulosin 0.4 milliGRAM(s) Oral at bedtime  tiotropium 18 MICROgram(s) Capsule 1 Capsule(s) Inhalation daily  vancomycin  IVPB 1500 milliGRAM(s) IV Intermittent every 24 hours    MEDICATIONS  (PRN):  acetaminophen     Tablet .. 650 milliGRAM(s) Oral every 6 hours PRN Mild Pain (1 - 3), Moderate Pain (4 - 6)  melatonin 5 milliGRAM(s) Oral at bedtime PRN Insomnia  LABS:                        12.2   7.41  )-----------( 351      ( 23 Dec 2021 07:11 )             39.2     CBC Full  -  ( 23 Dec 2021 07:11 )  WBC Count : 7.41 K/uL  Hemoglobin : 12.2 g/dL  Hematocrit : 39.2 %  Platelet Count - Automated : 351 K/uL  Mean Cell Volume : 86.2 fl  Mean Cell Hemoglobin : 26.8 pg  Mean Cell Hemoglobin Concentration : 31.1 gm/dL  Auto Neutrophil # : 5.06 K/uL  Auto Lymphocyte # : 1.32 K/uL  Auto Monocyte # : 0.92 K/uL  Auto Eosinophil # : 0.03 K/uL  Auto Basophil # : 0.02 K/uL  Auto Neutrophil % : 68.3 %  Auto Lymphocyte % : 17.8 %  Auto Monocyte % : 12.4 %  Auto Eosinophil % : 0.4 %  Auto Basophil % : 0.3 %    23 Dec 2021 07:11    140    |  103    |  25     ----------------------------<  178    4.0     |  31     |  1.40     Ca    10.0       23 Dec 2021 07:11    TPro  6.7    /  Alb  2.7    /  TBili  0.4    /  DBili  x      /  AST  13     /  ALT  14     /  AlkPhos  91     23 Dec 2021 07:11        CAPILLARY BLOOD GLUCOSE  POCT Blood Glucose.: 312 mg/dL (23 Dec 2021 11:43)  POCT Blood Glucose.: 220 mg/dL (23 Dec 2021 07:48)  POCT Blood Glucose.: 261 mg/dL (22 Dec 2021 21:44)  POCT Blood Glucose.: 174 mg/dL (22 Dec 2021 16:51)        Culture - Blood (collected 12-17-21 @ 21:22)  Source: .Blood Blood  Final Report (12-22-21 @ 22:01):    No Growth Final    Culture - Blood (collected 12-17-21 @ 21:22)  Source: .Blood Blood  Final Report (12-22-21 @ 22:01):    No Growth Final        Personally reviewed.     Consultant(s) Notes Reviewed:  [x] YES  [ ] NO

## 2021-12-23 NOTE — PROGRESS NOTE ADULT - PROBLEM SELECTOR PLAN 9
- CAD s/p 3v CABG 2004, stents in 2019  - continue beta blocker   - D/w IR and cardio - Hold Plavix and continue ASA 81 mg daily in setting of planned procedure  - On Eliquis for ? PAT, last dose given on 12/17, will resume following procedure pending okay from IR - CAD s/p 3v CABG 2004, stents in 2019  - continue beta blocker   - D/w IR and cardio - Hold Plavix and continue ASA 81 mg daily in setting of planned procedure  - On Eliquis

## 2021-12-23 NOTE — PROGRESS NOTE ADULT - SUBJECTIVE AND OBJECTIVE BOX
YUE     PLV 1EAS 107 W1    Allergies    No Known Allergies    Intolerances    shellfish (Nausea)      PAST MEDICAL & SURGICAL HISTORY:  Diabetes Mellitus, Type II    CAD (Coronary Artery Disease)  s/p 3v CABG ; [facility]    Dyslipidemia    Osteomyelitis    COPD (chronic obstructive pulmonary disease)  on 2L at home and BiPAP at night; intubated     Hypertension    PVD (peripheral vascular disease)    History of PAT (paroxysmal atrial tachycardia)    CABG (Coronary Artery Bypass Graft)      Compound fracture  left leg    S/P primary angioplasty with coronary stent        FAMILY HISTORY:  Family history of diabetes mellitus (Sibling)    Family hx of lung cancer  brother,  age 82, used to smoke with pt        Home Medications:  azithromycin 250 mg oral tablet: 1 tab(s) orally Monday, Wednesday, and Friday (18 Dec 2021 01:56)  Breo Ellipta 200 mcg-25 mcg/inh inhalation powder: 1 puff(s) inhaled once a day (18 Dec 2021 01:56)  Incruse Ellipta 62.5 mcg/inh inhalation powder: 1 puff(s) inhaled every 24 hours (18 Dec 2021 01:56)  Jardiance 25 mg oral tablet: 1 tab(s) orally once a day (in the morning) (18 Dec 2021 01:56)  losartan 25 mg oral tablet: 1 tab(s) orally once a day (18 Dec 2021 01:56)  metFORMIN 1000 mg oral tablet: 1 tab(s) orally 2 times a day w/food  please resume the dose on 08/10/2021  (18 Dec 2021 01:56)  NovoLOG FlexPen 100 units/mL injectable solution: Inject 5 units at BS over 200, 10 units at BS over 300, and 15 units at BS over 400 (18 Dec 2021 01:56)  Nucala 100 mg subcutaneous injection: 100 milligram(s) subcutaneous every 4 weeks    *Last given 21* (18 Dec 2021 01:56)  predniSONE 2 mg oral delayed release tablet: 2 tab(s) orally once a day (18 Dec 2021 01:56)  rosuvastatin 20 mg oral tablet: 1 tab(s) orally once a day (at bedtime) (18 Dec 2021 01:56)  tamsulosin 0.4 mg oral capsule: 1 cap(s) orally once a day (at bedtime) (18 Dec 2021 01:56)      MEDICATIONS  (STANDING):  apixaban 5 milliGRAM(s) Oral every 12 hours  aspirin enteric coated 81 milliGRAM(s) Oral daily  atorvastatin 80 milliGRAM(s) Oral at bedtime  azithromycin   Tablet 250 milliGRAM(s) Oral <User Schedule>  budesonide 160 MICROgram(s)/formoterol 4.5 MICROgram(s) Inhaler 2 Puff(s) Inhalation two times a day  cefepime   IVPB 2000 milliGRAM(s) IV Intermittent every 12 hours  dextrose 40% Gel 15 Gram(s) Oral once  dextrose 5%. 1000 milliLiter(s) (50 mL/Hr) IV Continuous <Continuous>  dextrose 5%. 1000 milliLiter(s) (100 mL/Hr) IV Continuous <Continuous>  dextrose 50% Injectable 25 Gram(s) IV Push once  dextrose 50% Injectable 12.5 Gram(s) IV Push once  dextrose 50% Injectable 25 Gram(s) IV Push once  glucagon  Injectable 1 milliGRAM(s) IntraMuscular once  insulin lispro (ADMELOG) corrective regimen sliding scale   SubCutaneous three times a day before meals  insulin lispro (ADMELOG) corrective regimen sliding scale   SubCutaneous at bedtime  metoprolol tartrate 50 milliGRAM(s) Oral two times a day  predniSONE   Tablet 4 milliGRAM(s) Oral daily  sodium chloride 0.9%. 1000 milliLiter(s) (60 mL/Hr) IV Continuous <Continuous>  tamsulosin 0.4 milliGRAM(s) Oral at bedtime  tiotropium 18 MICROgram(s) Capsule 1 Capsule(s) Inhalation daily  vancomycin  IVPB 1500 milliGRAM(s) IV Intermittent every 24 hours    MEDICATIONS  (PRN):  acetaminophen     Tablet .. 650 milliGRAM(s) Oral every 6 hours PRN Mild Pain (1 - 3), Moderate Pain (4 - 6)  melatonin 5 milliGRAM(s) Oral at bedtime PRN Insomnia      Diet, Consistent Carbohydrate w/Evening Snack:   DASH/TLC Sodium & Cholesterol Restricted (21 @ 17:43) [Active]          Vital Signs Last 24 Hrs  T(C): 36.7 (23 Dec 2021 05:30), Max: 37.1 (22 Dec 2021 21:21)  T(F): 98.1 (23 Dec 2021 05:30), Max: 98.8 (22 Dec 2021 21:21)  HR: 86 (23 Dec 2021 05:30) (81 - 106)  BP: 115/77 (23 Dec 2021 05:30) (115/77 - 119/77)  BP(mean): --  RR: 20 (23 Dec 2021 05:30) (18 - 20)  SpO2: 94% (23 Dec 2021 05:30) (92% - 96%)      21 @ 07:01  -  21 @ 07:00  --------------------------------------------------------  IN: 790 mL / OUT: 400 mL / NET: 390 mL              LABS:                        12.2   7.41  )-----------( 351      ( 23 Dec 2021 07:11 )             39.2         140  |  103  |  25<H>  ----------------------------<  178<H>  4.0   |  31  |  1.40<H>    Ca    10.0      23 Dec 2021 07:11    TPro  6.7  /  Alb  2.7<L>  /  TBili  0.4  /  DBili  x   /  AST  13<L>  /  ALT  14  /  AlkPhos  91                WBC:  WBC Count: 7.41 K/uL ( @ 07:11)  WBC Count: 6.97 K/uL ( @ 04:56)  WBC Count: 6.98 K/uL ( @ 06:47)  WBC Count: 6.53 K/uL ( @ 05:37)      MICROBIOLOGY:  RECENT CULTURES:   .Blood Blood XXXX XXXX   No Growth Final                    Sodium:  Sodium, Serum: 140 mmol/L ( @ 07:11)  Sodium, Serum: 141 mmol/L ( @ 04:56)  Sodium, Serum: 144 mmol/L ( @ 06:47)  Sodium, Serum: 143 mmol/L ( @ 05:37)      1.40 mg/dL  @ 07:11  1.50 mg/dL  @ 04:56  1.40 mg/dL  @ 06:47  1.40 mg/dL  @ 05:37      Hemoglobin:  Hemoglobin: 12.2 g/dL ( @ 07:11)  Hemoglobin: 11.7 g/dL ( @ 04:56)  Hemoglobin: 12.1 g/dL ( @ 06:47)  Hemoglobin: 11.7 g/dL ( @ 05:37)      Platelets: Platelet Count - Automated: 351 K/uL ( @ 07:11)  Platelet Count - Automated: 340 K/uL ( @ 04:56)  Platelet Count - Automated: 325 K/uL ( @ 06:47)  Platelet Count - Automated: 335 K/uL ( @ 05:37)      LIVER FUNCTIONS - ( 23 Dec 2021 07:11 )  Alb: 2.7 g/dL / Pro: 6.7 g/dL / ALK PHOS: 91 U/L / ALT: 14 U/L / AST: 13 U/L / GGT: x                 RADIOLOGY & ADDITIONAL STUDIES:      MICROBIOLOGY:  RECENT CULTURES:   .Blood Blood XXXX XXXX   No Growth Final

## 2021-12-23 NOTE — PROGRESS NOTE ADULT - ASSESSMENT
The patient is an 80yo male with pmhx CAD s/p 3v CABG 2004, stents in 2019, HLD, HTN, PAT(on Eliquis), PVD(s/p stents in b/l legs -- right leg in 2020), osteomyelitis, COPD on prn home o2 and nocturnal bipap, type 2 Dm  presents to ED with leg pain x 1 week , found to have CT findings c/w abscess in left femur now with concern for osteo.    The patient is an 80yo male with pmhx CAD s/p 3v CABG 2004, stents in 2019, HLD, HTN, PAT(on Eliquis), PVD(s/p stents in b/l legs -- right leg in 2020), osteomyelitis, COPD on prn home o2 and nocturnal bipap, type 2 Dm  presents to ED with leg pain x 1 week , found to have CT findings c/w abscess in left femur now with concern for  abscess with chr om  .

## 2021-12-23 NOTE — PROGRESS NOTE ADULT - PROBLEM SELECTOR PLAN 8
- pt with remote hx of PAT vs PAF; diagnosed in [facility] within the last year  - c/w home metoprolol  - on home Eliquis 5mg BID; last dose AM of 12/17. Hold for possible OR intervention of intramedullary abscess   - EKG: SR with premature supraventricular complexes

## 2021-12-23 NOTE — PROGRESS NOTE ADULT - PROBLEM SELECTOR PLAN 12
- DVT Prophylaxis: SCD's; home Eliquis and plavix on hold for possible IR intervention. - DVT Prophylaxis: SCD's.   For IR procedure Plavix on hold since 12/20., Eliquis was restarted and last dose will be 12/24 pm.  Continue with ASA 81mg daily.

## 2021-12-23 NOTE — PROGRESS NOTE ADULT - PROBLEM SELECTOR PLAN 5
- chronic  - hold home metformin and Jardiance  - Continue MDSS   - hypoglycemia protocol, accuchecks   - A1c 7.6 - chronic  - hold home metformin and Jardiance  - Continue MDSS   - hypoglycemia protocol, accuchecks   - A1c 7.6  - Persistent elevated glucose over 200's including fasting  - Lantus 5u at bedtime ordered  - Continue to monitor

## 2021-12-23 NOTE — PROGRESS NOTE ADULT - SUBJECTIVE AND OBJECTIVE BOX
[facility] -- Saud Aguilar Grossman, [place], Carroll Bass, Carolina Heath: Office # [phone]    Follow Up:  CAD HTN HLD    Subjective/Observations: Patient seen and examined, awake, alert, resting comfortably in bed, denies chest pain, dyspnea, palpitations or dizziness, orthopnea and PND. Tolerating room air.  ABX infusing     REVIEW OF SYSTEMS: All review of systems is negative for eye, ENT, GI, , allergic, dermatologic, musculoskeletal and neurologic except as described above    PAST MEDICAL & SURGICAL HISTORY:  Diabetes Mellitus, Type II    CAD (Coronary Artery Disease)  s/p 3v CABG 2004; [facility]    Dyslipidemia    Osteomyelitis    COPD (chronic obstructive pulmonary disease)  on 2L at home and BiPAP at night; intubated 6/18    Hypertension    PVD (peripheral vascular disease)    History of PAT (paroxysmal atrial tachycardia)    CABG (Coronary Artery Bypass Graft)  2004    Compound fracture  left leg    S/P primary angioplasty with coronary stent        MEDICATIONS  (STANDING):  apixaban 5 milliGRAM(s) Oral every 12 hours  aspirin enteric coated 81 milliGRAM(s) Oral daily  atorvastatin 80 milliGRAM(s) Oral at bedtime  azithromycin   Tablet 250 milliGRAM(s) Oral <User Schedule>  budesonide 160 MICROgram(s)/formoterol 4.5 MICROgram(s) Inhaler 2 Puff(s) Inhalation two times a day  cefepime   IVPB 2000 milliGRAM(s) IV Intermittent every 12 hours  dextrose 40% Gel 15 Gram(s) Oral once  dextrose 5%. 1000 milliLiter(s) (50 mL/Hr) IV Continuous <Continuous>  dextrose 5%. 1000 milliLiter(s) (100 mL/Hr) IV Continuous <Continuous>  dextrose 50% Injectable 25 Gram(s) IV Push once  dextrose 50% Injectable 12.5 Gram(s) IV Push once  dextrose 50% Injectable 25 Gram(s) IV Push once  glucagon  Injectable 1 milliGRAM(s) IntraMuscular once  insulin lispro (ADMELOG) corrective regimen sliding scale   SubCutaneous three times a day before meals  insulin lispro (ADMELOG) corrective regimen sliding scale   SubCutaneous at bedtime  metoprolol tartrate 50 milliGRAM(s) Oral two times a day  predniSONE   Tablet 4 milliGRAM(s) Oral daily  sodium chloride 0.9%. 1000 milliLiter(s) (60 mL/Hr) IV Continuous <Continuous>  tamsulosin 0.4 milliGRAM(s) Oral at bedtime  tiotropium 18 MICROgram(s) Capsule 1 Capsule(s) Inhalation daily  vancomycin  IVPB 1500 milliGRAM(s) IV Intermittent every 24 hours    MEDICATIONS  (PRN):  acetaminophen     Tablet .. 650 milliGRAM(s) Oral every 6 hours PRN Mild Pain (1 - 3), Moderate Pain (4 - 6)  melatonin 5 milliGRAM(s) Oral at bedtime PRN Insomnia    Allergies    No Known Allergies    Intolerances    shellfish (Nausea)    Vital Signs Last 24 Hrs  T(C): 36.7 (23 Dec 2021 05:30), Max: 37.1 (22 Dec 2021 21:21)  T(F): 98.1 (23 Dec 2021 05:30), Max: 98.8 (22 Dec 2021 21:21)  HR: 86 (23 Dec 2021 05:30) (81 - 106)  BP: 115/77 (23 Dec 2021 05:30) (115/77 - 119/77)  BP(mean): --  RR: 20 (23 Dec 2021 05:30) (18 - 20)  SpO2: 94% (23 Dec 2021 05:30) (92% - 96%)  I&O's Summary    22 Dec 2021 07:01  -  23 Dec 2021 07:00  --------------------------------------------------------  IN: 790 mL / OUT: 400 mL / NET: 390 mL          TELE: Not on telemetry   PHYSICAL EXAM:  Appearance: NAD, no distress, alert,  HEENT: Moist Mucous Membranes, Anicteric  Cardiovascular: Regular rate and rhythm, Normal S1 S2, No JVD, No murmurs, No rubs, gallops or clicks  Respiratory: Non-labored, Clear to auscultation, No rales, No rhonchi, No wheezing.   Gastrointestinal:  Soft, Non-tender, + BS  Neurologic: Non-focal  Skin: Warm and dry, No visible rashes or ulcers, No ecchymosis, No cyanosis  Musculoskeletal: No clubbing, No cyanosis, No joint swelling/tenderness  Psychiatry: Mood & affect appropriate  Lymph: No peripheral edema.     LABS: All Labs Reviewed:                        12.2   7.41  )-----------( 351      ( 23 Dec 2021 07:11 )             39.2                         11.7   6.97  )-----------( 340      ( 22 Dec 2021 04:56 )             38.5                         12.1   6.98  )-----------( 325      ( 21 Dec 2021 06:47 )             38.8     23 Dec 2021 07:11    140    |  103    |  25     ----------------------------<  178    4.0     |  31     |  1.40   22 Dec 2021 04:56    141    |  105    |  25     ----------------------------<  160    3.8     |  31     |  1.50   21 Dec 2021 06:47    144    |  108    |  23     ----------------------------<  186    4.3     |  30     |  1.40     Ca    10.0       23 Dec 2021 07:11  Ca    10.0       22 Dec 2021 04:56  Ca    9.6        21 Dec 2021 06:47  Phos  3.1       21 Dec 2021 06:47  Mg     2.1       21 Dec 2021 06:47    TPro  6.7    /  Alb  2.7    /  TBili  0.4    /  DBili  x      /  AST  13     /  ALT  14     /  AlkPhos  91     23 Dec 2021 07:11                    12 Lead ECG:   Ventricular Rate 74 BPM    Atrial Rate 74 BPM    P-R Interval 148 ms    QRS Duration 86 ms    Q-T Interval 368 ms    QTC Calculation(Bazett) 408 ms    P Axis 65 degrees    R Axis 71 degrees    T Axis 62 degrees    Diagnosis Line Normal sinus rhythmwith sinus arrhythmia  Confirmed by MICHAEL BASS ([id number]) on 12/20/2021 12:42:13 PM (12-20-21 @ 11:05)    < from: Xray Chest 1 View AP/PA (12.17.21 @ 17:09) >    ACC: [id number] EXAM:  XR CHEST AP OR PA 1V                          PROCEDURE DATE:  12/17/2021          INTERPRETATION:  Evaluate for pneumonia    AP chest. Prior 9/3/2021.    Median sternotomy. Normal heart mediastinum. Hyperinflated lungs. No   consolidation or effusion.    IMPRESSION: Hyperinflated lungs. No active infiltrates    --- End of Report ---            RODRIGO FAITH MD; Attending Radiologist  This document has been electronically signed. Dec 18 2021  9:39AM    < end of copied text >  < from: TTE Echo Complete w/o Contrast w/ Doppler (12.20.21 @ 08:28) >     EXAM:  ECHO TTE WO CON COMP W DOPP         PROCEDURE DATE:  12/20/2021        INTERPRETATION:  INDICATION: Ischemic heart disease  sonographer KL    Blood Pressure 123/70    Height 180.3 cm     Weight 97.5 kg       BSA 2.2   sq m    Dimensions:  LA 3.0       Normal Values: 2.0 - 4.0 cm  Ao 3.5        Normal Values: 2.0 - 3.8 cm  SEPTUM 1.3       Normal Values: 0.6 - 1.2 cm  PWT 1.0       Normal Values: 0.6 - 1.1 cm  LVIDd 4.3         Normal Values: 3.0 - 5.6 cm  LVIDs 1.8         Normal Values: 1.8 - 4.0 cm      OBSERVATIONS:  Technically difficult and limited study  Mitral Valve: normal, trace physiologic MR.  Aortic Valve/Aorta: Sclerotic trileaflet aortic valve with normal opening.  Tricuspid Valve: Not well-visualized  Pulmonic Valve: Not well-visualized  Left Atrium: normal  Right Atrium: Not well-visualized  Left Ventricle: The left ventricular endocardium is not well-visualized.   Overall normal systolic function with an estimated LVEF of 55%. Cannot   evaluate for segmental abnormalities  Right Ventricle: Not well-visualized  Pericardium: no significant pericardial effusion.  Pulmonary/RV Pressure: estimated PA systolic pressure of 28 mmHg  LV diastolic dysfunction is present        IMPRESSION:  Technically difficult and limited study  The left ventricular endocardium is not well-visualized. Overall normal   systolic function with an estimated LVEF of 55-60%. Cannot evaluate for   segmental abnormalities  The right ventricle is not well-visualized  Sclerotic trileaflet aortic valve, without AI.  Trace physiologic MR  No significant pericardial effusion.    --- End of Report ---              ARISTIDES LEE MD; Attending Cardiologist  This document has been electronically signed. Dec 21 2021  1:38PM    < end of copied text >

## 2021-12-23 NOTE — PROGRESS NOTE ADULT - PROBLEM SELECTOR PLAN 1
Intramedullary abscess in left femur.   - Pt with left leg pain exacerbated for 1 week. Pain now is resolved.   - h/o femur fracture many years ago with episodes of pain and infection in the old fracture area, had multiple surgeries and drainage with a long term antibiotic treatment, last one years ago.   - CT: Old healed deformity of the mid left femur. Complex heterogeneous lobulated soft  tissue anterior and lateral to the middle two thirds of the left femur concerning for an abscess. New lobulated intramedullary defect with tract extending to the cortical surface in the mid left femur concerning for a possible intramedullary abscess.  - MRI w/w/o IV cont: Multiloculated rim-enhancing abscess along the anterior + lateral aspect of the mid to distal femur. Given the thick rim of enhancement, recommend f/u MRI w/wo contrast after therapy to rule out an underlying mass/neoplasm. Probable intraosseous abscess within the mid to proximal diaphysis of the femur near the superior aspect of the abscess with probable areas of sequestrum, involucrum, and cloaca formation when correlated with recent CT. Findings also concerning for adjacent chronic osteo.  - Afebrile, no leukocytosis.  - Doppler negative for DVT  - BCx x2 NGTD  - Continue cefepime and vanco - adjusted for renal dose. Day #5 (started on 12/18/21)  - ID (Severiano), Ortho consulted, recs appreciated   - D/w IR - plan for drain placement once patient has been off Plavix for 5 days. Eliquis on hold since 12/17, Plavix on hold since 12/20. Continue with ASA 81mg daily Intramedullary abscess in left femur.   - Pt with left leg pain exacerbated for 1 week. Pain now is resolved.   - h/o femur fracture many years ago with episodes of pain and infection in the old fracture area, had multiple surgeries and drainage with a long term antibiotic treatment, last one years ago.   - CT: Old healed deformity of the mid left femur. Complex heterogeneous lobulated soft  tissue anterior and lateral to the middle two thirds of the left femur concerning for an abscess. New lobulated intramedullary defect with tract extending to the cortical surface in the mid left femur concerning for a possible intramedullary abscess.  - MRI w/w/o IV cont: Multiloculated rim-enhancing abscess along the anterior + lateral aspect of the mid to distal femur. Given the thick rim of enhancement, recommend f/u MRI w/wo contrast after therapy to rule out an underlying mass/neoplasm. Probable intraosseous abscess within the mid to proximal diaphysis of the femur near the superior aspect of the abscess with probable areas of sequestrum, involucrum, and cloaca formation when correlated with recent CT. Findings also concerning for adjacent chronic osteo.  - Afebrile, no leukocytosis.  - Doppler negative for DVT  - BCx x2 NGTD  - Continue cefepime and vanco - adjusted for renal dose. Day #5 (started on 12/18/21)  - ID (Severiano), Ortho consulted,   - D/w IR - plan for drain placement once patient has been off Plavix for 5 days. Eliquis on hold since 12/17, Plavix on hold since 12/20. Continue with ASA 81mg daily Intramedullary abscess in left femur.   - Pt with left leg pain exacerbated for 1 week. Pain now is resolved.   - h/o femur fracture many years ago with episodes of pain and infection in the old fracture area, had multiple surgeries and drainage with a long term antibiotic treatment, last one years ago.   - CT: Old healed deformity of the mid left femur. Complex heterogeneous lobulated soft  tissue anterior and lateral to the middle two thirds of the left femur concerning for an abscess. New lobulated intramedullary defect with tract extending to the cortical surface in the mid left femur concerning for a possible intramedullary abscess.  - MRI w/w/o IV cont: Multiloculated rim-enhancing abscess along the anterior + lateral aspect of the mid to distal femur. Given the thick rim of enhancement, recommend f/u MRI w/wo contrast after therapy to rule out an underlying mass/neoplasm. Probable intraosseous abscess within the mid to proximal diaphysis of the femur near the superior aspect of the abscess with probable areas of sequestrum, involucrum, and cloaca formation when correlated with recent CT. Findings also concerning for adjacent chronic osteo.  - Afebrile, no leukocytosis.  - Doppler negative for DVT  - BCx x2 NGTD  - Continue cefepime and vanco - adjusted for renal dose. Day #5 (started on 12/18/21)  - ID (Severiano), Ortho consulted,   - D/w IR - plan for drain placement on 12/27/21. Plavix on hold since 12/20. Eliquis was restarted and last dose will be 12/24 pm.  Continue with ASA 81mg daily

## 2021-12-23 NOTE — PROGRESS NOTE ADULT - ASSESSMENT
COMMODORE TURNER 81 m 2021 [facility] P [phone]  1940  VERNON ESPINOZA    REVIEW OF SYMPTOMS      Able to give ROS  Yes     RELIABLE +/-   CONSTITUTIONAL Weakness Yes  Chills No   ENDOCRINE  No heat or cold intolerance    ALLERGY No hives  Sore throat No stridor  RESP Coughing blood no  Shortness of breath YES   NEURO No Headache  Confusion Pain neck No   CARDIAC No Chest pain No Palpitations   GI  Pain abdomen NO   Vomiting NO     PHYSICAL EXAM    HEENT Unremarkable  atraumatic   RESP Fair air entry EXP prolonged    Harsh breath sound Resp distres mild   CARDIAC S1 S2 No S3     NO JVD    ABDOMEN SOFT BS PRESENT NOT DISTENDED No hepatosplenomegaly PEDAL EDEMA present No calf tenderness  NO rash       ________________  PATIENT PRESENTATION.   80yo man with PMH of HTN, COPD on home O2, CAD s/p CABG, PVD s/p stents in b/l legs and left femoral fracture more than 50 years ago, was admitted 2021 with left leg pain on the site of old fracture after CT showed possible intramedullary abscess. He has had pain and infection in the old fracture area at least 3-4 times in the past, had multiple surgeries and drainage of area with a long term antibiotic treatment, last one years ago.   Most likely he is here now  with another infection and osteomyelitis with collection in area that needs intervention by ortho. Is on coverage empirically with 4th Gen cephalosporins and vancomycin until  culture info.   Doppler neg for DVTs  Admission WBC normal, no fever  ESR=60     CRP=70  Pt admitted  Pulm consulted       ________________  HOSPITAL COURSE.   OM Femur   Cefepime 2.2    Vanco 1.5/d   COPD on home O2  NOCT BPAP   RESP FAILURE    PMH   COPD ASTHMA Former smoker 2019 FVC 2.20 58% Post 2.61 69% +18 FEV1   0.86 31% Post 0.89 32% +4%  FEV1% 39%     INTUBATION CAC 2018   CAD sp cabg pmh  PVD stents pmh   l FEM FX YR AGO With local episodic infectn    _____                            GOC. 2021 Full code   COVID STATUS.   ICU STAY. none  ABIO.    AZITHRO q MWF    Cefepime 2.2    Vanco 1.5/d          BEST PRACTICE ISSUES.                                                  HEAD OF BED ELEVATION. Yes  DVT PROPHYLAXIS.    2021 apixaba 5.2 (a f)                                      MCCORMICK PROPHYLAXIS.                                                                                         DIET.     cons carb                       INFECTION PROPHYLAXIS.    SPEECH SWALLOW RECOMMENDATIONS.       GENERAL ISSUES                                       ALLERGY.   shellfish                         WEIGHT.  2021 97                                   BMI.            2021 30    PATIENT DATA   VITALS/PO/IO/VENT/DRIPS.   2021 afeb 78 100/60   2021 ra 95%   2021 18//14/.21      ASSESSMENT/RECOMMENDATIONS.    RESP.   is on noct bpap    HEMODYNAMICS.   bp ok    VANCO t   --2021 VT 11.7-13 - 15     INFECTION.   Possible osteomyelitis   w -2021 w 6.5 - 6.9   mr femur with contr   Multiloculated nm enhancing abscess along ant and lateral aspect of mid to distal femur cw osteomyelitis   blod c  (-)    AZITHRO q MWF    Cefepime 2.2    Vanco 1.5/d       COPD   symbicort 160    pred 4    symbicor     CAD.   metoprolol 50.2    asa 81     ANEMIA  Hb 2021 Hb 11   Monitor     CKD  Cr -2021 Cr 1.4- 1.5  Monitor       TIME SPENT   Over 25 minutes aggregate care time spent on encounter; activities included   direct patient care, counseling and/or coordinating care reviewing notes, lab data/ imaging , discussion with multidisciplinary team/ patient  /family and explaining in detail risks, benefits, alternatives  of the recommendations     COMMODORE TURNER 81 m 2021 [facility] P [phone]  1940  VERNON ESPINOZA

## 2021-12-23 NOTE — PROGRESS NOTE ADULT - PROBLEM SELECTOR PLAN 3
- CKD 3, Baseline Cr 1.2 - 1.5 as per nephro  - Cr on admission 1.60  - Cr 1.50 today  - Continue fluids  - Hold home losartan  - DASH diet  - hold all nephrotoxic agents  - trend CMP - CKD 3, Baseline Cr 1.2 - 1.5 as per nephro  - Cr on admission 1.60  - Cr 1.40 today  - Continue fluids  - Hold home losartan  - DASH diet  - hold all nephrotoxic agents  - trend CMP

## 2021-12-23 NOTE — PROGRESS NOTE ADULT - SUBJECTIVE AND OBJECTIVE BOX
Patient is a 81y old  Male who presents with a chief complaint of osteo (20 Dec 2021 10:41)    Patient seen in follow up for MERRILL.        PAST MEDICAL HISTORY:  Diabetes Mellitus, Type II    CAD (Coronary Artery Disease)    Dyslipidemia    Asymptomatic Peripheral Vascular Disease    Osteomyelitis    COPD (chronic obstructive pulmonary disease)    Diabetes mellitus    Hypertension    PVD (peripheral vascular disease)    History of PAT (paroxysmal atrial tachycardia)      MEDICATIONS  (STANDING):  apixaban 5 milliGRAM(s) Oral every 12 hours  aspirin enteric coated 81 milliGRAM(s) Oral daily  atorvastatin 80 milliGRAM(s) Oral at bedtime  azithromycin   Tablet 250 milliGRAM(s) Oral <User Schedule>  budesonide 160 MICROgram(s)/formoterol 4.5 MICROgram(s) Inhaler 2 Puff(s) Inhalation two times a day  cefepime   IVPB 2000 milliGRAM(s) IV Intermittent every 12 hours  dextrose 40% Gel 15 Gram(s) Oral once  dextrose 5%. 1000 milliLiter(s) (50 mL/Hr) IV Continuous <Continuous>  dextrose 5%. 1000 milliLiter(s) (100 mL/Hr) IV Continuous <Continuous>  dextrose 50% Injectable 25 Gram(s) IV Push once  dextrose 50% Injectable 12.5 Gram(s) IV Push once  dextrose 50% Injectable 25 Gram(s) IV Push once  glucagon  Injectable 1 milliGRAM(s) IntraMuscular once  insulin glargine Injectable (LANTUS) 5 Unit(s) SubCutaneous at bedtime  insulin lispro (ADMELOG) corrective regimen sliding scale   SubCutaneous three times a day before meals  insulin lispro (ADMELOG) corrective regimen sliding scale   SubCutaneous at bedtime  metoprolol tartrate 50 milliGRAM(s) Oral two times a day  predniSONE   Tablet 4 milliGRAM(s) Oral daily  sodium chloride 0.9%. 1000 milliLiter(s) (60 mL/Hr) IV Continuous <Continuous>  tamsulosin 0.4 milliGRAM(s) Oral at bedtime  tiotropium 18 MICROgram(s) Capsule 1 Capsule(s) Inhalation daily  vancomycin  IVPB 1500 milliGRAM(s) IV Intermittent every 24 hours    MEDICATIONS  (PRN):  acetaminophen     Tablet .. 650 milliGRAM(s) Oral every 6 hours PRN Mild Pain (1 - 3), Moderate Pain (4 - 6)  melatonin 5 milliGRAM(s) Oral at bedtime PRN Insomnia    T(C): 36.9 (12-23-21 @ 12:05), Max: 37.1 (12-22-21 @ 21:21)  HR: 95 (12-23-21 @ 12:05) (78 - 106)  BP: 118/76 (12-23-21 @ 12:05) (103/67 - 119/77)  RR: 18 (12-23-21 @ 12:05)  SpO2: 95% (12-23-21 @ 12:05)  Wt(kg): --  I&O's Detail    22 Dec 2021 07:01  -  23 Dec 2021 07:00  --------------------------------------------------------  IN:    IV PiggyBack: 50 mL    IV PiggyBack: 500 mL    Oral Fluid: 240 mL  Total IN: 790 mL    OUT:    Voided (mL): 400 mL  Total OUT: 400 mL    Total NET: 390 mL      23 Dec 2021 07:01  -  23 Dec 2021 14:38  --------------------------------------------------------  IN:  Total IN: 0 mL    OUT:    Voided (mL): 700 mL  Total OUT: 700 mL    Total NET: -700 mL                    PHYSICAL EXAM:  General: No distress  Respiratory: b/l air entry  Cardiovascular: S1 S2  Gastrointestinal: soft  Extremities:  no edema                   LABORATORY:                        12.2   7.41  )-----------( 351      ( 23 Dec 2021 07:11 )             39.2     12-23    140  |  103  |  25<H>  ----------------------------<  178<H>  4.0   |  31  |  1.40<H>    Ca    10.0      23 Dec 2021 07:11    TPro  6.7  /  Alb  2.7<L>  /  TBili  0.4  /  DBili  x   /  AST  13<L>  /  ALT  14  /  AlkPhos  91  12-23    Sodium, Serum: 140 mmol/L (12-23 @ 07:11)  Sodium, Serum: 141 mmol/L (12-22 @ 04:56)    Potassium, Serum: 4.0 mmol/L (12-23 @ 07:11)  Potassium, Serum: 3.8 mmol/L (12-22 @ 04:56)    Hemoglobin: 12.2 g/dL (12-23 @ 07:11)  Hemoglobin: 11.7 g/dL (12-22 @ 04:56)  Hemoglobin: 12.1 g/dL (12-21 @ 06:47)    Creatinine, Serum 1.40 (12-23 @ 07:11)  Creatinine, Serum 1.50 (12-22 @ 04:56)  Creatinine, Serum 1.40 (12-21 @ 06:47)        LIVER FUNCTIONS - ( 23 Dec 2021 07:11 )  Alb: 2.7 g/dL / Pro: 6.7 g/dL / ALK PHOS: 91 U/L / ALT: 14 U/L / AST: 13 U/L / GGT: x

## 2021-12-23 NOTE — PROGRESS NOTE ADULT - ATTENDING COMMENTS
pt seen and examine today  see above plan -Intramedullary abscess in left femur - hold Plavix  , continue Eliquis hold 48 hr before  procedure and continue asa no need to hold for ir guided drain    ir guided Monday  -cefepime 2 gm q12 hr  , 250 mg  Zithromax , 1500 vancomycin q24 hr / vanco  trough 15.4 wnl  blood cult neg todate   .

## 2021-12-23 NOTE — PROGRESS NOTE ADULT - ASSESSMENT
CKD 3 Baseline creatinine 1.2-1.5  Diabetes  Hypertension  Left femur abscess    Stable renal indices. Awaiting drainage. To continue current meds. Monitor blood sugar levels. Insulin coverage as needed. Dietary restriction.   Monitor BP trend. Titrate BP meds as needed. Salt restriction. IV abx. ID follow up.

## 2021-12-23 NOTE — PROGRESS NOTE ADULT - ATTENDING COMMENTS
Seen/examined. agree with above.  doing well from cv standpoint  plavix on hold  eliquis restarted and to stop 48 hours prior to procedure

## 2021-12-23 NOTE — PROGRESS NOTE ADULT - SUBJECTIVE AND OBJECTIVE BOX
[facility]  INFECTIOUS DISEASES   [address] [place] [address]  Tel: [phone]     Fax: [phone]  ======================================================  MD Noman Perry Kaushal, MD Cho, Michelle, MD   ======================================================    [facility]-[id number]  [facility]     Follow up: Left femoral OM and intramedullary abscess     Patient seen and examined, no overnight event.   No fever, no new complaint, no new changes.   For drainage on .     PAST MEDICAL & SURGICAL HISTORY:  Diabetes Mellitus, Type II  CAD (Coronary Artery Disease)  s/p 3v CABG ; stents placed in [place] in   Dyslipidemia  Osteomyelitis  COPD (chronic obstructive pulmonary disease)  on 2L at home and BiPAP at night; intubated   Hypertension  PVD (peripheral vascular disease)  History of PAT (paroxysmal atrial tachycardia)  CABG (Coronary Artery Bypass Graft)    Compound fracture  left leg  S/P primary angioplasty with coronary stent    Social Hx: Former smoker, no ETOH or drugs     FAMILY HISTORY:  Family history of diabetes mellitus (Sibling)  Family hx of lung cancer  brother,  age 82, used to smoke with pt  Allergies  No Known Allergies    Intolerances  shellfish (Nausea)    Antibiotics:  MEDICATIONS  (STANDING):  atorvastatin 80 milliGRAM(s) Oral at bedtime  azithromycin   Tablet 250 milliGRAM(s) Oral <User Schedule>  budesonide 160 MICROgram(s)/formoterol 4.5 MICROgram(s) Inhaler 2 Puff(s) Inhalation two times a day  cefepime   IVPB 2000 milliGRAM(s) IV Intermittent every 12 hours  clopidogrel Tablet 75 milliGRAM(s) Oral daily  dextrose 40% Gel 15 Gram(s) Oral once  dextrose 5%. 1000 milliLiter(s) (50 mL/Hr) IV Continuous <Continuous>  dextrose 5%. 1000 milliLiter(s) (100 mL/Hr) IV Continuous <Continuous>  dextrose 50% Injectable 25 Gram(s) IV Push once  dextrose 50% Injectable 12.5 Gram(s) IV Push once  dextrose 50% Injectable 25 Gram(s) IV Push once  glucagon  Injectable 1 milliGRAM(s) IntraMuscular once  insulin lispro (ADMELOG) corrective regimen sliding scale   SubCutaneous three times a day before meals  insulin lispro (ADMELOG) corrective regimen sliding scale   SubCutaneous at bedtime  metoprolol tartrate 50 milliGRAM(s) Oral two times a day  predniSONE   Tablet 4 milliGRAM(s) Oral daily  sodium chloride 0.9%. 1000 milliLiter(s) (60 mL/Hr) IV Continuous <Continuous>  tamsulosin 0.4 milliGRAM(s) Oral at bedtime  tiotropium 18 MICROgram(s) Capsule 1 Capsule(s) Inhalation daily  vancomycin  IVPB 1500 milliGRAM(s) IV Intermittent every 24 hours     REVIEW OF SYSTEMS:  CONSTITUTIONAL:  No Fever or chills  HEENT:  No diplopia or blurred vision.  No sore throat or runny nose.  CARDIOVASCULAR:  No chest pain or SOB.  RESPIRATORY:  No cough, shortness of breath, PND or orthopnea.  GASTROINTESTINAL:  No nausea, vomiting or diarrhea.  GENITOURINARY:  No dysuria, frequency or urgency. No Blood in urine  MUSCULOSKELETAL: left leg pain   SKIN:  No change in skin, hair or nails.  NEUROLOGIC:  No paresthesias, fasciculations, seizures or weakness.  PSYCHIATRIC:  No disorder of thought or mood.  ENDOCRINE:  No heat or cold intolerance, polyuria or polydipsia.  HEMATOLOGICAL:  No easy bruising or bleeding.     Physical Exam:  Vital Signs Last 24 Hrs  T(C): 36.9 (23 Dec 2021 12:05), Max: 37.1 (22 Dec 2021 21:21)  T(F): 98.4 (23 Dec 2021 12:05), Max: 98.8 (22 Dec 2021 21:21)  HR: 95 (23 Dec 2021 12:05) (81 - 106)  BP: 118/76 (23 Dec 2021 12:05) (115/77 - 119/77)  BP(mean): --  RR: 18 (23 Dec 2021 12:05) (18 - 20)  SpO2: 95% (23 Dec 2021 12:05) (92% - 96%)  GEN: NAD, obese   HEENT: normocephalic and atraumatic. EOMI. PERRL.    NECK: Supple.  No lymphadenopathy   LUNGS: Clear to auscultation.  HEART: Regular rate and rhythm   ABDOMEN: Soft, nontender, and nondistended.  Positive bowel sounds.    : No CVA tenderness  EXTREMITIES: left leg in thigh area has a scar in lateral side with no discharge or open wound.  Swelling in anterior part, no tenderness or fluctuation. No sign of cellulitis on soft tissue.   NEUROLOGIC: grossly intact.  PSYCHIATRIC: Appropriate affect .  SKIN: No rash, as above     Labs:                        12.2   7.41  )-----------( 351      ( 23 Dec 2021 07:11 )             39.2         140  |  103  |  25<H>  ----------------------------<  178<H>  4.0   |  31  |  1.40<H>    Ca    10.0      23 Dec 2021 07:11    TPro  6.7  /  Alb  2.7<L>  /  TBili  0.4  /  DBili  x   /  AST  13<L>  /  ALT  14  /  AlkPhos  91        Culture - Blood (collected 21 @ 21:22)  Source: .Blood Blood  Final Report (21 @ 22:01):    No Growth Final    Culture - Blood (collected 21 @ 21:22)  Source: .Blood Blood  Final Report (21 @ 22:01):    No Growth Final    WBC Count: 7.41 K/uL (21 @ 07:11)  WBC Count: 6.97 K/uL (21 @ 04:56)  WBC Count: 6.98 K/uL (21 @ 06:47)  WBC Count: 6.53 K/uL (21 @ 05:37)  WBC Count: 7.67 K/uL (21 @ 09:06)    Creatinine, Serum: 1.40 mg/dL (21 @ 07:11)  Creatinine, Serum: 1.50 mg/dL (21 @ 04:56)  Creatinine, Serum: 1.40 mg/dL (21 @ 06:47)  Creatinine, Serum: 1.40 mg/dL (21 @ 05:37)  Creatinine, Serum: 1.40 mg/dL (21 @ 09:06)  Creatinine, Serum: 1.50 mg/dL (21 @ 13:41)    C-Reactive Protein, Serum: 81 mg/L (21 @ 15:37)  C-Reactive Protein, Serum: 70 mg/L (21 @ 17:04)    Sedimentation Rate, Erythrocyte: 60 mm/hr (21 @ 09:06)  Sedimentation Rate, Erythrocyte: 42 mm/hr (21 @ 10:44)    COVID-19 PCR: NotDetec (21 @ 17:43)    All imaging and other data have been reviewed.  < from: CT Femur No Cont, Left (21 @ 22:24) >  IMPRESSION:  Old healed deformity of the mid left femur likely correlating with the   history of a prior osteomyelitis. Complex heterogeneous lobulated soft   tissue anterior and lateral to the middle two thirds of the left femur   concerning for an abscess. New lobulated intramedullary defect with tract   extending to the cortical surface in the mid left femur concerning for a   possible intramedullary abscess. A contrast-enhancedMRI of the left   femur is recommended for further evaluation.    Assessment and Plan:   80yo man with PMH of HTN, COPD on home O2, CAD s/p CABG, PVD s/p stents in b/l legs and left femoral fracture more than 50 years ago, was admitted with left leg pain on the site of old fracture after CT showed possible intramedullary abscess. He has had pain and infection in the old fracture area at least 3-4 times in the past, had multiple surgeries and drainage of area with a long term antibiotic treatment, last one years ago.   Most likely another infection and osteomyelitis with collection in area that needs intervention by ortho. Will cover empirically with 4th Gen cephalosporins and vancomycin until we have culture info.   Doppler neg for DVTs  Admission WBC normal, no fever  ESR=60     CRP=70    1- Left femoral OM and intramedullary abscess   - Blood cultures NGTD   - MRI showed collection, intraosseous abscess   - Ortho consult noted, will need IR drain placement, please send for cultures and pathology   - Continue cefepime 2gm q12 adjusted for renal function   - Continue vancomycin 1500mg daily, trough is therapeutic, will keep 15 to 20.   - Follow creatinine to adjust ABx doses, mild MERRILL/CKD, creat improving   - Based on culture will need a long treatment with IV antibiotics will need a PICC line    2- COPD  - Continue azithromycin 250mg 3times weekly prophylactically   - Pulmonary consult noted  - On o2 and BiPAP at night time      Will follow.    Brandi العلي MD  Division of Infectious Diseases   Cell [phone] between 8am and 6pm   After 6pm and weekends please call ID service at [phone].     >25 minutes spent on total encounter assessing patient, examination, chart reivew, counseling and coordinating care by the attending physician/nurse/care manager.

## 2021-12-23 NOTE — PROGRESS NOTE ADULT - ASSESSMENT
80yo male with pmhx DM2, CAD s/p 3v CABG 2004, stents in 2019, HLD, HTN, PAT(on Eliquis), PVD(s/p stents in b/l legs -- right leg in 2020), osteomyelitis, COPD on prn home o2 and nocturnal bipap, type 2 Dm who presented to ED with increasing left leg pain x 1 week.  Now found to have w/ osteomyelitis, concern for intramedullary abscess    Cardiac Summary   TTE 8/3/21 w/ hypokinesis mild DD     CAD/HTN/HLD/PAT/pre-post cardiac optimization   - planned for IR drain/placement (12/27)  - Plavix on hold (x5 days) for IR procedure  - continue ASA as per chart review no need to hold prior to procedure  - continue Eliquis hold 48 hr before procedure.  - HR controlled and stable   - continue  statin, BB,   - ACEi on hold 2/2 MERIRLL   - EKG, NSR no ischemic changes noted     - Pt has no active ischemia, decompensated heart failure, unstable arrythmia, or severe stenotic valvular disease. Patient is optimized from cardiovascular standpoint to proceed with planned procedure with routine hemodynamic monitoring.     - Monitor and replete Lytes. Keep K > 4 and Mg > 2  - All other medical needs as per primary team.  - Other cardiovascular workup will depend on clinical course.  - Will continue to follow.    Nikki Chan [facility]  Nurse Practitioner - Cardiology   Spectra #[phone]/ [phone]

## 2021-12-24 LAB
ALBUMIN SERPL ELPH-MCNC: 2.8 G/DL — LOW (ref 3.3–5)
ALP SERPL-CCNC: 92 U/L — SIGNIFICANT CHANGE UP (ref 40–120)
ALT FLD-CCNC: 15 U/L — SIGNIFICANT CHANGE UP (ref 12–78)
ANION GAP SERPL CALC-SCNC: 7 MMOL/L — SIGNIFICANT CHANGE UP (ref 5–17)
AST SERPL-CCNC: 9 U/L — LOW (ref 15–37)
BASOPHILS # BLD AUTO: 0.02 K/UL — SIGNIFICANT CHANGE UP (ref 0–0.2)
BASOPHILS NFR BLD AUTO: 0.3 % — SIGNIFICANT CHANGE UP (ref 0–2)
BILIRUB SERPL-MCNC: 0.3 MG/DL — SIGNIFICANT CHANGE UP (ref 0.2–1.2)
BUN SERPL-MCNC: 25 MG/DL — HIGH (ref 7–23)
CALCIUM SERPL-MCNC: 9.6 MG/DL — SIGNIFICANT CHANGE UP (ref 8.5–10.1)
CHLORIDE SERPL-SCNC: 105 MMOL/L — SIGNIFICANT CHANGE UP (ref 96–108)
CO2 SERPL-SCNC: 28 MMOL/L — SIGNIFICANT CHANGE UP (ref 22–31)
CREAT SERPL-MCNC: 1.3 MG/DL — SIGNIFICANT CHANGE UP (ref 0.5–1.3)
EOSINOPHIL # BLD AUTO: 0.05 K/UL — SIGNIFICANT CHANGE UP (ref 0–0.5)
EOSINOPHIL NFR BLD AUTO: 0.8 % — SIGNIFICANT CHANGE UP (ref 0–6)
GLUCOSE SERPL-MCNC: 234 MG/DL — HIGH (ref 70–99)
HCT VFR BLD CALC: 38.7 % — LOW (ref 39–50)
HGB BLD-MCNC: 11.9 G/DL — LOW (ref 13–17)
IMM GRANULOCYTES NFR BLD AUTO: 0.9 % — SIGNIFICANT CHANGE UP (ref 0–1.5)
LYMPHOCYTES # BLD AUTO: 0.99 K/UL — LOW (ref 1–3.3)
LYMPHOCYTES # BLD AUTO: 15.3 % — SIGNIFICANT CHANGE UP (ref 13–44)
MCHC RBC-ENTMCNC: 26.7 PG — LOW (ref 27–34)
MCHC RBC-ENTMCNC: 30.7 GM/DL — LOW (ref 32–36)
MCV RBC AUTO: 86.8 FL — SIGNIFICANT CHANGE UP (ref 80–100)
MONOCYTES # BLD AUTO: 0.74 K/UL — SIGNIFICANT CHANGE UP (ref 0–0.9)
MONOCYTES NFR BLD AUTO: 11.5 % — SIGNIFICANT CHANGE UP (ref 2–14)
NEUTROPHILS # BLD AUTO: 4.6 K/UL — SIGNIFICANT CHANGE UP (ref 1.8–7.4)
NEUTROPHILS NFR BLD AUTO: 71.2 % — SIGNIFICANT CHANGE UP (ref 43–77)
NRBC # BLD: 0 /100 WBCS — SIGNIFICANT CHANGE UP (ref 0–0)
PLATELET # BLD AUTO: 348 K/UL — SIGNIFICANT CHANGE UP (ref 150–400)
POTASSIUM SERPL-MCNC: 3.8 MMOL/L — SIGNIFICANT CHANGE UP (ref 3.5–5.3)
POTASSIUM SERPL-SCNC: 3.8 MMOL/L — SIGNIFICANT CHANGE UP (ref 3.5–5.3)
PROT SERPL-MCNC: 6.8 G/DL — SIGNIFICANT CHANGE UP (ref 6–8.3)
RBC # BLD: 4.46 M/UL — SIGNIFICANT CHANGE UP (ref 4.2–5.8)
RBC # FLD: 13.1 % — SIGNIFICANT CHANGE UP (ref 10.3–14.5)
SARS-COV-2 RNA SPEC QL NAA+PROBE: SIGNIFICANT CHANGE UP
SODIUM SERPL-SCNC: 140 MMOL/L — SIGNIFICANT CHANGE UP (ref 135–145)
WBC # BLD: 6.46 K/UL — SIGNIFICANT CHANGE UP (ref 3.8–10.5)
WBC # FLD AUTO: 6.46 K/UL — SIGNIFICANT CHANGE UP (ref 3.8–10.5)

## 2021-12-24 PROCEDURE — 99233 SBSQ HOSP IP/OBS HIGH 50: CPT | Mod: GC

## 2021-12-24 PROCEDURE — 99232 SBSQ HOSP IP/OBS MODERATE 35: CPT

## 2021-12-24 RX ORDER — INSULIN LISPRO 100/ML
2 VIAL (ML) SUBCUTANEOUS
Refills: 0 | Status: DISCONTINUED | OUTPATIENT
Start: 2021-12-24 | End: 2021-12-25

## 2021-12-24 RX ADMIN — BUDESONIDE AND FORMOTEROL FUMARATE DIHYDRATE 2 PUFF(S): 160; 4.5 AEROSOL RESPIRATORY (INHALATION) at 19:02

## 2021-12-24 RX ADMIN — BUDESONIDE AND FORMOTEROL FUMARATE DIHYDRATE 2 PUFF(S): 160; 4.5 AEROSOL RESPIRATORY (INHALATION) at 06:25

## 2021-12-24 RX ADMIN — Medication 650 MILLIGRAM(S): at 11:20

## 2021-12-24 RX ADMIN — TIOTROPIUM BROMIDE 1 CAPSULE(S): 18 CAPSULE ORAL; RESPIRATORY (INHALATION) at 06:25

## 2021-12-24 RX ADMIN — CEFEPIME 100 MILLIGRAM(S): 1 INJECTION, POWDER, FOR SOLUTION INTRAMUSCULAR; INTRAVENOUS at 06:32

## 2021-12-24 RX ADMIN — ATORVASTATIN CALCIUM 80 MILLIGRAM(S): 80 TABLET, FILM COATED ORAL at 22:02

## 2021-12-24 RX ADMIN — Medication 4: at 08:05

## 2021-12-24 RX ADMIN — Medication 5 MILLIGRAM(S): at 22:03

## 2021-12-24 RX ADMIN — Medication 2 UNIT(S): at 17:12

## 2021-12-24 RX ADMIN — Medication 2: at 17:11

## 2021-12-24 RX ADMIN — Medication 300 MILLIGRAM(S): at 11:20

## 2021-12-24 RX ADMIN — AZITHROMYCIN 250 MILLIGRAM(S): 500 TABLET, FILM COATED ORAL at 06:23

## 2021-12-24 RX ADMIN — Medication 50 MILLIGRAM(S): at 06:23

## 2021-12-24 RX ADMIN — CEFEPIME 100 MILLIGRAM(S): 1 INJECTION, POWDER, FOR SOLUTION INTRAMUSCULAR; INTRAVENOUS at 17:38

## 2021-12-24 RX ADMIN — Medication 8: at 12:07

## 2021-12-24 RX ADMIN — Medication 650 MILLIGRAM(S): at 12:20

## 2021-12-24 RX ADMIN — Medication 81 MILLIGRAM(S): at 11:20

## 2021-12-24 RX ADMIN — APIXABAN 5 MILLIGRAM(S): 2.5 TABLET, FILM COATED ORAL at 06:23

## 2021-12-24 RX ADMIN — Medication 50 MILLIGRAM(S): at 17:38

## 2021-12-24 RX ADMIN — Medication 4 MILLIGRAM(S): at 06:23

## 2021-12-24 RX ADMIN — TAMSULOSIN HYDROCHLORIDE 0.4 MILLIGRAM(S): 0.4 CAPSULE ORAL at 22:02

## 2021-12-24 NOTE — DIETITIAN INITIAL EVALUATION ADULT. - PROBLEM SELECTOR PLAN 1
Intramedullary Abscess   - pt with h/o compound fracture/osteomyelitis of left femur. now p/w left leg pain.   - CT revealed Old healed deformity of the mid left femur , Complex heterogeneous lobulated soft  tissue anterior and lateral to the middle two thirds of the left femur   concerning for an abscess. New lobulated intramedullary defect with tract   extending to the cortical surface in the mid left femur concerning for a   possible intramedullary abscess.   - tylenol for pain  - f/u BCx x2  - Doppler negative for DVT  - Afebrile, no leukocytosis. monitor for fever. trend daily cbc with diff   - s/p vancomycin & Zosyn x 1 continue   - imaging studies performed non contrast due to pt renal function. will likely require additional imaging. awaiting ortho recs   - Ortho consulted-- will see patient in am , recommend ID evaluation   - ID, Dr. العلي, consulted

## 2021-12-24 NOTE — DIETITIAN INITIAL EVALUATION ADULT. - PROBLEM SELECTOR PROBLEM 5
Patient was reached by phone, and RN reviewed her symptom of rash under armpits and under breasts, patient thinks it could be heat rash.  Patient was offered an in patient appt for evaluation, but declined.  She was agreeable to having a video visit with PCP for tomorrow morning, and appt was made.    Kiki Eddy RN on 7/21/2021 at 9:56 AM    
No
Hypertension

## 2021-12-24 NOTE — PROGRESS NOTE ADULT - PROBLEM SELECTOR PLAN 8
- pt with remote hx of PAT vs PAF; diagnosed in [facility] within the last year  - c/w home metoprolol  - on home Eliquis 5mg BID; last dose AM of 12/17. Hold for possible OR intervention of intramedullary abscess   - EKG: SR with premature supraventricular complexes

## 2021-12-24 NOTE — PROGRESS NOTE ADULT - ASSESSMENT
COMMODORE TURNER 81 m 2021 [facility] P [phone]  1940  VERNON ESPINOZA    REVIEW OF SYMPTOMS      Able to give ROS  Yes     RELIABLE +/-   CONSTITUTIONAL Weakness Yes  Chills No   ENDOCRINE  No heat or cold intolerance    ALLERGY No hives  Sore throat No stridor  RESP Coughing blood no  Shortness of breath YES   NEURO No Headache  Confusion Pain neck No   CARDIAC No Chest pain No Palpitations   GI  Pain abdomen NO   Vomiting NO     PHYSICAL EXAM    HEENT Unremarkable  atraumatic   RESP Fair air entry EXP prolonged    Harsh breath sound Resp distres mild   CARDIAC S1 S2 No S3     NO JVD    ABDOMEN SOFT BS PRESENT NOT DISTENDED No hepatosplenomegaly PEDAL EDEMA present No calf tenderness  NO rash       ________________  PATIENT PRESENTATION.   82yo man with PMH of HTN, COPD on home O2, CAD s/p CABG, PVD s/p stents in b/l legs and left femoral fracture more than 50 years ago, was admitted 2021 with left leg pain on the site of old fracture after CT showed possible intramedullary abscess. He has had pain and infection in the old fracture area at least 3-4 times in the past, had multiple surgeries and drainage of area with a long term antibiotic treatment, last one years ago.   Most likely he is here now  with another infection and osteomyelitis with collection in area that needs intervention by ortho. Is on coverage empirically with 4th Gen cephalosporins and vancomycin until  culture info.   Doppler neg for DVTs  Admission WBC normal, no fever  ESR=60     CRP=70  Pt admitted  Pulm consulted     ________________  HOSPITAL COURSE.   OM Femur   Cefepime 2.2    Vanco 1.5/d   COPD on home O2  NOCT BPAP   RESP FAILURE    PMH   COPD ASTHMA Former smoker 2019 FVC 2.20 58% Post 2.61 69% +18 FEV1   0.86 31% Post 0.89 32% +4%  FEV1% 39%     INTUBATION CAC 2018   CAD sp cabg pmh  PVD stents pmh   l FEM FX YR AGO With local episodic infectn    _____                            GOC. 2021 Full code   COVID STATUS.   ICU STAY. none  ABIO.    AZITHRO q MWF    Cefepime 2.2          BEST PRACTICE ISSUES.                                                  HEAD OF BED ELEVATION. Yes  DVT PROPHYLAXIS.    2021 apixaba 5.2 (a f)                                      MCCORMICK PROPHYLAXIS.                                                                                         DIET.     cons carb                       INFECTION PROPHYLAXIS.    SPEECH SWALLOW RECOMMENDATIONS.       PATIENT DATA   VITALS/PO/IO/VENT/DRIPS.   2021 afeb 80 110/60   2021 ra 94     ASSESSMENT/RECOMMENDATIONS.    RESP.   is on noct bpap    HEMODYNAMICS.   bp ok    VANCO t   --2021 VT 11.7-13 - 15     INFECTION.   Possible osteomyelitis   w -2021 w 6.5 - 6.9   mr femur with contr   Multiloculated nm enhancing abscess along ant and lateral aspect of mid to distal femur cw osteomyelitis   blod c  (-)    AZITHRO q MWF    Cefepime 2.2     COPD   symbicort 160    pred 4    symbicor   under control    CAD.   metoprolol 50.2    asa 81     ANEMIA  Hb 2021 Hb 11   Monitor     CKD  Cr --2021 Cr 1.4- 1.5-1.3  Monitor     TIME SPENT   Over 25 minutes aggregate care time spent on encounter; activities included   direct patient care, counseling and/or coordinating care reviewing notes, lab data/ imaging , discussion with multidisciplinary team/ patient  /family and explaining in detail risks, benefits, alternatives  of the recommendations     COMMODORE TURNER 81 m 2021 [facility] P [phone]  1940  VERNON ESPINOZA

## 2021-12-24 NOTE — PROGRESS NOTE ADULT - SUBJECTIVE AND OBJECTIVE BOX
Patient is a 81y old  Male who presents with a chief complaint of increasing left leg pain (24 Dec 2021 12:38)    Intramedullary abscess in left femur possible   ir  guided  drain   INTERVAL HPI/OVERNIGHT EVENTS: Patient seen and examined at bedside- alert awake   aware ir guided drain monday   Denies fevers, chills, headache, lightheadedness, chest pain, dyspnea, leg pain.   INTERVAL HPI/OVERNIGHT EVENTS:     T(C): 36.5 (12-24-21 @ 05:25), Max: 36.7 (12-23-21 @ 20:29)  HR: 98 (12-24-21 @ 05:25) (76 - 98)  BP: 145/86 (12-24-21 @ 05:25) (101/66 - 145/86)  RR: 20 (12-24-21 @ 05:25) (18 - 20)  SpO2: 96% (12-24-21 @ 05:25) (94% - 98%)  Wt(kg): --  I&O's Summary    23 Dec 2021 07:01  -  24 Dec 2021 07:00  --------------------------------------------------------  IN: 290 mL / OUT: 1300 mL / NET: -1010 mL  REVIEW OF SYSTEMS:  CONSTITUTIONAL: No fever or chills  HEENT:  No headache, no sore throat  RESPIRATORY: No cough, wheezing, or shortness of breath  CARDIOVASCULAR: No chest pain, palpitations  GASTROINTESTINAL: No abd pain, nausea, vomiting, or diarrhea. + constipation.   GENITOURINARY: No dysuria, frequency, or hematuria  NEUROLOGICAL: no focal weakness or dizziness  MUSCULOSKELETAL: no myalgias, no LLE pain.       GENERAL: NAD,   HEAD:  Atraumatic  EYES: EOMI, PERRLA, conjunctiva and sclera clear  ENT: Moist mucous membranes  NECK: Supple, No JVD  CHEST/LUNG:  auscultation bilaterally. No rales, rhonchi, wheezing,.   HEART: Regular rate and rhythm. No murmurs, no tachy   ABDOMEN: Bowel sounds present. Soft, Nontender, Nondistended.  EXTREMITIES: 2+ Peripheral Pulses. No clubbing, cyanosis, or edema.   No pain, tenderness in LLE, full ROM.   NERVOUS SYSTEM:  Alert & Oriented X3, motor /sensory intact   SKIN: scar in distal lateral thigh      MEDICATIONS  (STANDING):  apixaban 5 milliGRAM(s) Oral every 12 hours  aspirin enteric coated 81 milliGRAM(s) Oral daily  atorvastatin 80 milliGRAM(s) Oral at bedtime  azithromycin   Tablet 250 milliGRAM(s) Oral <User Schedule>  budesonide 160 MICROgram(s)/formoterol 4.5 MICROgram(s) Inhaler 2 Puff(s) Inhalation two times a day  cefepime   IVPB 2000 milliGRAM(s) IV Intermittent every 12 hours  dextrose 40% Gel 15 Gram(s) Oral once  dextrose 5%. 1000 milliLiter(s) (50 mL/Hr) IV Continuous <Continuous>  dextrose 5%. 1000 milliLiter(s) (100 mL/Hr) IV Continuous <Continuous>  dextrose 50% Injectable 25 Gram(s) IV Push once  dextrose 50% Injectable 12.5 Gram(s) IV Push once  dextrose 50% Injectable 25 Gram(s) IV Push once  glucagon  Injectable 1 milliGRAM(s) IntraMuscular once  insulin glargine Injectable (LANTUS) 5 Unit(s) SubCutaneous at bedtime  insulin lispro (ADMELOG) corrective regimen sliding scale   SubCutaneous three times a day before meals  insulin lispro (ADMELOG) corrective regimen sliding scale   SubCutaneous at bedtime  metoprolol tartrate 50 milliGRAM(s) Oral two times a day  predniSONE   Tablet 4 milliGRAM(s) Oral daily  sodium chloride 0.9%. 1000 milliLiter(s) (60 mL/Hr) IV Continuous <Continuous>  tamsulosin 0.4 milliGRAM(s) Oral at bedtime  tiotropium 18 MICROgram(s) Capsule 1 Capsule(s) Inhalation daily    MEDICATIONS  (PRN):  acetaminophen     Tablet .. 650 milliGRAM(s) Oral every 6 hours PRN Mild Pain (1 - 3), Moderate Pain (4 - 6)  melatonin 5 milliGRAM(s) Oral at bedtime PRN Insomnia      LABS:                        11.9   6.46  )-----------( 348      ( 24 Dec 2021 09:26 )             38.7     12-24    140  |  105  |  25<H>  ----------------------------<  234<H>  3.8   |  28  |  1.30    Ca    9.6      24 Dec 2021 09:26    TPro  6.8  /  Alb  2.8<L>  /  TBili  0.3  /  DBili  x   /  AST  9<L>  /  ALT  15  /  AlkPhos  92  12-24        CAPILLARY BLOOD GLUCOSE      POCT Blood Glucose.: 301 mg/dL (24 Dec 2021 11:46)  POCT Blood Glucose.: 219 mg/dL (24 Dec 2021 07:30)  POCT Blood Glucose.: 188 mg/dL (23 Dec 2021 21:30)  POCT Blood Glucose.: 176 mg/dL (23 Dec 2021 16:43)              RADIOLOGY & ADDITIONAL TESTS:    Imaging Personally Reviewed:     no new test   Advance Directives:    full code   Palliative Care:  Appropriate

## 2021-12-24 NOTE — PROVIDER CONTACT NOTE (OTHER) - NAME OF MD/NP/PA/DO NOTIFIED:
Subjective:       Patient ID: Annie Lyles is a 47 y.o. female.    Chief Complaint: Establish Care    Patient Active Problem List   Diagnosis    Insomnia    Chronic right-sided low back pain without sciatica    Elevated BP without diagnosis of hypertension    History of CVA in adulthood    Status post bariatric surgery   Has lost from 320 lbs    Dr. Ferraro back specialist    Has had 2 implants.  Had rupture and mammogram hurts  HPI  Review of Systems   Constitutional: Negative for activity change and unexpected weight change.   HENT: Negative for hearing loss, rhinorrhea and trouble swallowing.    Eyes: Negative for discharge and visual disturbance.   Respiratory: Negative for chest tightness and wheezing.    Cardiovascular: Negative for chest pain and palpitations.   Gastrointestinal: Negative for blood in stool, constipation, diarrhea and vomiting.   Endocrine: Negative for polydipsia and polyuria.   Genitourinary: Negative for difficulty urinating, dysuria, hematuria and menstrual problem.   Musculoskeletal: Negative for arthralgias, joint swelling and neck pain.   Neurological: Negative for weakness and headaches.   Psychiatric/Behavioral: Negative for confusion and dysphoric mood.       Objective:      Physical Exam   Constitutional: She is oriented to person, place, and time. She appears well-developed and well-nourished.   Cardiovascular: Normal rate, regular rhythm and normal heart sounds.   Pulmonary/Chest: Effort normal and breath sounds normal.   Musculoskeletal: She exhibits no edema.   Neurological: She is alert and oriented to person, place, and time.   Skin: Skin is warm and dry.   Psychiatric: She has a normal mood and affect.   Nursing note and vitals reviewed.      Assessment:       1. Urinary tract infection without hematuria, site unspecified    2. Insomnia, unspecified type    3. Chronic right-sided low back pain without sciatica    4. Encounter for screening mammogram for malignant 
Will S
Sebastian MALDONADO
neoplasm of breast    5. Status post bariatric surgery -bypass 1998, lab band Bertha 2007    6. Lipid screening    7. Iron deficiency    8. Abnormal finding of blood chemistry     9. Sequelae of protein-calorie malnutrition         Plan:         1. Urinary tract infection without hematuria, site unspecified  Screen and treat as indicated:    - Urine culture  - POCT urinalysis, dipstick or tablet reag    2. Insomnia, unspecified type  Treat  - traZODone (DESYREL) 100 MG tablet; TAKE 1 TABLET(100 MG) BY MOUTH EVERY EVENING  Dispense: 90 tablet; Refill: 3    3. Chronic right-sided low back pain without sciatica  Treat  - pregabalin (LYRICA) 300 MG Cap; Take 1 capsule (300 mg total) by mouth 2 (two) times daily.  Dispense: 60 capsule; Refill: 5  - tiZANidine (ZANAFLEX) 4 MG tablet; Take 1 tablet (4 mg total) by mouth every 6 (six) hours as needed.  Dispense: 30 tablet; Refill: 2    4. Encounter for screening mammogram for malignant neoplasm of breast  Screen and treat as indicated:    - Mammo Digital Screening Bilateral With CAD; Future    5. Status post bariatric surgery -bypass 1998, lab band Bertha 2007  Cont post bariatric care, diet and monitoring  - CBC auto differential; Future  - Comprehensive metabolic panel; Future  - Vitamin B12; Future  - VITAMIN B1; Future  - VITAMIN B6; Future    6. Lipid screening  Screen and treat as indicated:  - Lipid panel; Future    7. Iron deficiency  Screen and treat as indicated:    - Ferritin; Future  - Iron and TIBC; Future    8. Abnormal finding of blood chemistry   Screen and treat as indicated:    - Lipid panel; Future    9. Sequelae of protein-calorie malnutrition   Screen and treat as indicated:    - Vitamin B12; Future    Patient Instructions (the written plan) was given to the patient/family: Yes  Time spent with patient: 20 minutes    Patient with be reevaluated in 6 months or sooner prn    Greater than 50% of this visit was spent counseling as described in above 
Dr Winter
documentation:Yes    
Dr Painting

## 2021-12-24 NOTE — PROGRESS NOTE ADULT - PROBLEM SELECTOR PLAN 9
- CAD s/p 3v CABG 2004, stents in 2019  - continue beta blocker   - D/w IR and cardio - Hold Plavix and continue ASA 81 mg daily in setting of planned procedure  - hold On Eliquis today

## 2021-12-24 NOTE — PROGRESS NOTE ADULT - ASSESSMENT
The patient is an 82yo male with pmhx CAD s/p 3v CABG 2004, stents in 2019, HLD, HTN, PAT(on Eliquis), PVD(s/p stents in b/l legs -- right leg in 2020), osteomyelitis, COPD on prn home o2 and nocturnal bipap, type 2 Dm  presents to ED with leg pain x 1 week , found to have CT findings c/w abscess in left femur now with concern for  abscess with chr om  .

## 2021-12-24 NOTE — PROVIDER CONTACT NOTE (OTHER) - SITUATION
blood sugar has been over 200 mg/dl  started Lantus 5 unit since 12/23 this morning sugar 219 and before lunch 301
Pt c/o neck pain 8/10. Requesting something stronger than Tylenol 650mg.
Telemetry called, pt had ST elevation of 2.1 mm.
Called by tele tech for 3 beats of V tach on tele monitor

## 2021-12-24 NOTE — PROGRESS NOTE ADULT - ASSESSMENT
82yo male with pmhx DM2, CAD s/p 3v CABG 2004, stents in 2019, HLD, HTN, PAT(on Eliquis)?, PVD(s/p stents in b/l legs -- right leg in 2020), osteomyelitis, COPD on prn home o2 and nocturnal bipap, type 2 Dm who presented to ED with increasing left leg pain x 1 week.  Now found to have w/ osteomyelitis, concern for intramedullary abscess    Cardiac Summary   TTE 8/3/21 w/ hypokinesis mild DD     CAD/HTN/HLD/PAT/pre-post cardiac optimization   - planned for IR drain/placement (12/27)  - Continue to hold Plavix (x5 days) for IR procedure  - Continue ASA as per chart review no need to hold prior to procedure  - Hold Eliquis (for PVD?) x 48 hr before procedure.  Resume per Surgery  - HR controlled and stable   - Continue BB and statin  - Though, MERRILL is resolved, continue to hold ACEi for now.  BP is soft  - EKG, NSR no ischemic changes noted   - Monitor and replete Lytes. Keep K > 4 and Mg > 2    - Pt has no active ischemia, decompensated heart failure, unstable arrythmia, or severe stenotic valvular disease. Patient is optimized from cardiovascular standpoint to proceed with planned procedure with routine hemodynamic monitoring.     - Will continue to follow.    Dena Maynard DNP, NP-C  Cardiology   Spectra #[phone]

## 2021-12-24 NOTE — PROGRESS NOTE ADULT - ATTENDING COMMENTS
pt seen and examine today  see above plan -Intramedullary abscess in left femur - hold Plavix  ,  procedure and continue asa no need to hold for ir guided drain    ir guided Monday  -cefepime 2 gm q12 hr  , 250 mg  Zithromax , 1500 vancomycin q24 hr / vanco  trough 15.4 wnl  blood cult neg todate   , hold on Eliquis   today  .

## 2021-12-24 NOTE — PROGRESS NOTE ADULT - PROBLEM SELECTOR PLAN 12
- DVT Prophylaxis: SCD's.   For IR procedure Plavix on hold since 12/20., Eliquis was restarted and last dose will be 12/24 pm.  Continue with ASA 81mg daily.

## 2021-12-24 NOTE — PROGRESS NOTE ADULT - SUBJECTIVE AND OBJECTIVE BOX
YUE     PLV 1EAS 107 W1    Allergies    No Known Allergies    Intolerances    shellfish (Nausea)      PAST MEDICAL & SURGICAL HISTORY:  Diabetes Mellitus, Type II    CAD (Coronary Artery Disease)  s/p 3[facility] CABG ; [facility]    Dyslipidemia    Osteomyelitis    COPD (chronic obstructive pulmonary disease)  on 2L at home and BiPAP at night; intubated     Hypertension    PVD (peripheral vascular disease)    History of PAT (paroxysmal atrial tachycardia)    CABG (Coronary Artery Bypass Graft)      Compound fracture  left leg    S/P primary angioplasty with coronary stent        FAMILY HISTORY:  Family history of diabetes mellitus (Sibling)    Family hx of lung cancer  brother,  age 82, used to smoke with pt        Home Medications:  azithromycin 250 mg oral tablet: 1 tab(s) orally Monday, Wednesday, and Friday (18 Dec 2021 01:56)  Breo Ellipta 200 mcg-25 mcg/inh inhalation powder: 1 puff(s) inhaled once a day (18 Dec 2021 01:56)  Incruse Ellipta 62.5 mcg/inh inhalation powder: 1 puff(s) inhaled every 24 hours (18 Dec 2021 01:56)  Jardiance 25 mg oral tablet: 1 tab(s) orally once a day (in the morning) (18 Dec 2021 01:56)  losartan 25 mg oral tablet: 1 tab(s) orally once a day (18 Dec 2021 01:56)  metFORMIN 1000 mg oral tablet: 1 tab(s) orally 2 times a day w/food  please resume the dose on 08/10/2021  (18 Dec 2021 01:56)  NovoLOG FlexPen 100 units/mL injectable solution: Inject 5 units at BS over 200, 10 units at BS over 300, and 15 units at BS over 400 (18 Dec 2021 01:56)  Nucala 100 mg subcutaneous injection: 100 milligram(s) subcutaneous every 4 weeks    *Last given 21* (18 Dec 2021 01:56)  predniSONE 2 mg oral delayed release tablet: 2 tab(s) orally once a day (18 Dec 2021 01:56)  rosuvastatin 20 mg oral tablet: 1 tab(s) orally once a day (at bedtime) (18 Dec 2021 01:56)  tamsulosin 0.4 mg oral capsule: 1 cap(s) orally once a day (at bedtime) (18 Dec 2021 01:56)      MEDICATIONS  (STANDING):  apixaban 5 milliGRAM(s) Oral every 12 hours  aspirin enteric coated 81 milliGRAM(s) Oral daily  atorvastatin 80 milliGRAM(s) Oral at bedtime  azithromycin   Tablet 250 milliGRAM(s) Oral <User Schedule>  budesonide 160 MICROgram(s)/formoterol 4.5 MICROgram(s) Inhaler 2 Puff(s) Inhalation two times a day  cefepime   IVPB 2000 milliGRAM(s) IV Intermittent every 12 hours  dextrose 40% Gel 15 Gram(s) Oral once  dextrose 5%. 1000 milliLiter(s) (50 mL/Hr) IV Continuous <Continuous>  dextrose 5%. 1000 milliLiter(s) (100 mL/Hr) IV Continuous <Continuous>  dextrose 50% Injectable 25 Gram(s) IV Push once  dextrose 50% Injectable 12.5 Gram(s) IV Push once  dextrose 50% Injectable 25 Gram(s) IV Push once  glucagon  Injectable 1 milliGRAM(s) IntraMuscular once  insulin glargine Injectable (LANTUS) 5 Unit(s) SubCutaneous at bedtime  insulin lispro (ADMELOG) corrective regimen sliding scale   SubCutaneous three times a day before meals  insulin lispro (ADMELOG) corrective regimen sliding scale   SubCutaneous at bedtime  metoprolol tartrate 50 milliGRAM(s) Oral two times a day  predniSONE   Tablet 4 milliGRAM(s) Oral daily  sodium chloride 0.9%. 1000 milliLiter(s) (60 mL/Hr) IV Continuous <Continuous>  tamsulosin 0.4 milliGRAM(s) Oral at bedtime  tiotropium 18 MICROgram(s) Capsule 1 Capsule(s) Inhalation daily  vancomycin  IVPB 1500 milliGRAM(s) IV Intermittent every 24 hours    MEDICATIONS  (PRN):  acetaminophen     Tablet .. 650 milliGRAM(s) Oral every 6 hours PRN Mild Pain (1 - 3), Moderate Pain (4 - 6)  melatonin 5 milliGRAM(s) Oral at bedtime PRN Insomnia      Diet, Consistent Carbohydrate w/Evening Snack:   DASH/TLC Sodium & Cholesterol Restricted (21 @ 17:43) [Active]          Vital Signs Last 24 Hrs  T(C): 36.5 (24 Dec 2021 05:25), Max: 36.9 (23 Dec 2021 12:05)  T(F): 97.7 (24 Dec 2021 05:25), Max: 98.4 (23 Dec 2021 12:05)  HR: 98 (24 Dec 2021 05:25) (76 - 98)  BP: 145/86 (24 Dec 2021 05:25) (101/66 - 145/86)  BP(mean): --  RR: 20 (24 Dec 2021 05:25) (18 - 20)  SpO2: 96% (24 Dec 2021 05:25) (94% - 98%)      21 @ 07:01  -  21 @ 07:00  --------------------------------------------------------  IN: 290 mL / OUT: 1300 mL / NET: -1010 mL              LABS:                        12.2   7.41  )-----------( 351      ( 23 Dec 2021 07:11 )             39.2         140  |  103  |  25<H>  ----------------------------<  178<H>  4.0   |  31  |  1.40<H>    Ca    10.0      23 Dec 2021 07:11    TPro  6.7  /  Alb  2.7<L>  /  TBili  0.4  /  DBili  x   /  AST  13<L>  /  ALT  14  /  AlkPhos  91                WBC:  WBC Count: 7.41 K/uL ( @ 07:11)  WBC Count: 6.97 K/uL ( @ 04:56)  WBC Count: 6.98 K/uL ( @ 06:47)      MICROBIOLOGY:  RECENT CULTURES:   .Blood Blood XXXX XXXX   No Growth Final                    Sodium:  Sodium, Serum: 140 mmol/L ( @ 07:11)  Sodium, Serum: 141 mmol/L ( @ 04:56)  Sodium, Serum: 144 mmol/L ( @ 06:47)      1.40 mg/dL  @ 07:11  1.50 mg/dL  @ 04:56  1.40 mg/dL  @ 06:47      Hemoglobin:  Hemoglobin: 12.2 g/dL ( @ 07:11)  Hemoglobin: 11.7 g/dL ( @ 04:56)  Hemoglobin: 12.1 g/dL ( @ 06:47)      Platelets: Platelet Count - Automated: 351 K/uL ( @ 07:11)  Platelet Count - Automated: 340 K/uL ( @ 04:56)  Platelet Count - Automated: 325 K/uL ( @ 06:47)      LIVER FUNCTIONS - ( 23 Dec 2021 07:11 )  Alb: 2.7 g/dL / Pro: 6.7 g/dL / ALK PHOS: 91 U/L / ALT: 14 U/L / AST: 13 U/L / GGT: x                 RADIOLOGY & ADDITIONAL STUDIES:      MICROBIOLOGY:  RECENT CULTURES:   .Blood Blood XXXX XXXX   No Growth Final

## 2021-12-24 NOTE — PROGRESS NOTE ADULT - PROBLEM SELECTOR PLAN 5
- chronic  - hold home metformin and Jardiance  - Continue MDSS   - hypoglycemia protocol, accuchecks   - A1c 7.6  - Persistent elevated glucose over 200's including fasting  - Lantus 5u at bedtime ordered  - Continue to monitor

## 2021-12-24 NOTE — PROVIDER CONTACT NOTE (OTHER) - ACTION/TREATMENT ORDERED:
Sebastian MALDONADO made aware. MD put in for EKG. MD reviewed EKG results with cardio. No interventions to be done at this time. Pt remains on remote tele, will continue to monitor.
will add more insuline
MD Dr Painting notified. Stated will order Tylenol 1000mg PO. Pt notified of same. Will continue to asses and monitor.
Pt seen by MD, no orders received at this time

## 2021-12-24 NOTE — DIETITIAN INITIAL EVALUATION ADULT. - OTHER INFO
81 year old male Dx cellulitis abscess  PMH HTN COPD CAD DM type 2 CABG    patient denies any recent weight change  no problems chewing swallowing   eating % per flow sheets  POCT elevated on prednsione with history DM at home on metformin novolog jardiance

## 2021-12-24 NOTE — PROGRESS NOTE ADULT - SUBJECTIVE AND OBJECTIVE BOX
[facility] -- Saud Aguilar, Génesis Louise, Carroll Bass Savella, [place]  Office # [phone]    Follow Up:  CAD s/p CABG/stents, Cardiac Optimization    Subjective/Observations: Awake and alert, comfortable on RA.  Was on BIPAP overnight.  No complaints except LLE pain    REVIEW OF SYSTEMS: All other review of systems is negative unless indicated above  PAST MEDICAL & SURGICAL HISTORY:  Diabetes Mellitus, Type II    CAD (Coronary Artery Disease)  s/p 3v CABG 2004; stents placed [place] in [place]    Dyslipidemia    Osteomyelitis    COPD (chronic obstructive pulmonary disease)  on 2L at home and BiPAP at night; intubated 6/18    Hypertension    PVD (peripheral vascular disease)    History of PAT (paroxysmal atrial tachycardia)    CABG (Coronary Artery Bypass Graft)  2004    Compound fracture  left leg    S/P primary angioplasty with coronary stent    MEDICATIONS  (STANDING):  aspirin enteric coated 81 milliGRAM(s) Oral daily  atorvastatin 80 milliGRAM(s) Oral at bedtime  azithromycin   Tablet 250 milliGRAM(s) Oral <User Schedule>  budesonide 160 MICROgram(s)/formoterol 4.5 MICROgram(s) Inhaler 2 Puff(s) Inhalation two times a day  cefepime   IVPB 2000 milliGRAM(s) IV Intermittent every 12 hours  dextrose 40% Gel 15 Gram(s) Oral once  dextrose 5%. 1000 milliLiter(s) (50 mL/Hr) IV Continuous <Continuous>  dextrose 5%. 1000 milliLiter(s) (100 mL/Hr) IV Continuous <Continuous>  dextrose 50% Injectable 25 Gram(s) IV Push once  dextrose 50% Injectable 12.5 Gram(s) IV Push once  dextrose 50% Injectable 25 Gram(s) IV Push once  glucagon  Injectable 1 milliGRAM(s) IntraMuscular once  insulin lispro (ADMELOG) corrective regimen sliding scale   SubCutaneous three times a day before meals  insulin lispro (ADMELOG) corrective regimen sliding scale   SubCutaneous at bedtime  insulin lispro Injectable (ADMELOG) 2 Unit(s) SubCutaneous three times a day before meals  metoprolol tartrate 50 milliGRAM(s) Oral two times a day  predniSONE   Tablet 4 milliGRAM(s) Oral daily  sodium chloride 0.9%. 1000 milliLiter(s) (60 mL/Hr) IV Continuous <Continuous>  tamsulosin 0.4 milliGRAM(s) Oral at bedtime  tiotropium 18 MICROgram(s) Capsule 1 Capsule(s) Inhalation daily    MEDICATIONS  (PRN):  acetaminophen     Tablet .. 650 milliGRAM(s) Oral every 6 hours PRN Mild Pain (1 - 3), Moderate Pain (4 - 6)  melatonin 5 milliGRAM(s) Oral at bedtime PRN Insomnia    Allergies    No Known Allergies    Intolerances    shellfish (Nausea)    Vital Signs Last 24 Hrs  T(C): 36.8 (24 Dec 2021 13:31), Max: 36.8 (24 Dec 2021 13:31)  T(F): 98.3 (24 Dec 2021 13:31), Max: 98.3 (24 Dec 2021 13:31)  HR: 87 (24 Dec 2021 13:31) (76 - 98)  BP: 102/63 (24 Dec 2021 13:31) (101/66 - 145/86)  BP(mean): --  RR: 18 (24 Dec 2021 13:31) (18 - 20)  SpO2: 95% (24 Dec 2021 13:31) (94% - 98%)  I&O's Summary    23 Dec 2021 07:01  -  24 Dec 2021 07:00  --------------------------------------------------------  IN: 290 mL / OUT: 1300 mL / NET: -1010 mL    24 Dec 2021 07:01  -  24 Dec 2021 14:17  --------------------------------------------------------  IN: 0 mL / OUT: 700 mL / NET: -700 mL     PHYSICAL EXAM:  TELE: Not on tele  Constitutional: NAD, awake and alert, obese  HEENT: Moist Mucous Membranes, Anicteric  Pulmonary: Non-labored, breath sounds are clear but diminished bilaterally, No wheezing, rales or rhonchi  Cardiovascular: Regular, S1 and S2, No murmurs, rubs, gallops or clicks  Gastrointestinal: Bowel Sounds present, soft, nontender.   Lymph: No peripheral edema. No lymphadenopathy.  Skin: No visible rashes or ulcers.  Psych:  Mood & affect appropriate  LABS: All Labs Reviewed:                        11.9   6.46  )-----------( 348      ( 24 Dec 2021 09:26 )             38.7                         12.2   7.41  )-----------( 351      ( 23 Dec 2021 07:11 )             39.2                         11.7   6.97  )-----------( 340      ( 22 Dec 2021 04:56 )             38.5     24 Dec 2021 09:26    140    |  105    |  25     ----------------------------<  234    3.8     |  28     |  1.30   23 Dec 2021 07:11    140    |  103    |  25     ----------------------------<  178    4.0     |  31     |  1.40   22 Dec 2021 04:56    141    |  105    |  25     ----------------------------<  160    3.8     |  31     |  1.50     Ca    9.6        24 Dec 2021 09:26  Ca    10.0       23 Dec 2021 07:11  Ca    10.0       22 Dec 2021 04:56    TPro  6.8    /  Alb  2.8    /  TBili  0.3    /  DBili  x      /  AST  9      /  ALT  15     /  AlkPhos  92     24 Dec 2021 09:26  TPro  6.7    /  Alb  2.7    /  TBili  0.4    /  DBili  x      /  AST  13     /  ALT  14     /  AlkPhos  91     23 Dec 2021 07:11       EXAM:  ECHO TTE WO CON COMP W DOPP         PROCEDURE DATE:  12/20/2021        INTERPRETATION:  INDICATION: Ischemic heart disease  sonographer KL    Blood Pressure 123/70    Height 180.3 cm     Weight 97.5 kg       BSA 2.2   sq m    Dimensions:  LA 3.0       Normal Values: 2.0 - 4.0 cm  Ao 3.5        Normal Values: 2.0 - 3.8 cm  SEPTUM 1.3       Normal Values: 0.6 - 1.2 cm  PWT 1.0       Normal Values: 0.6 - 1.1 cm  LVIDd 4.3         Normal Values: 3.0 - 5.6 cm  LVIDs 1.8         Normal Values: 1.8 - 4.0 cm      OBSERVATIONS:  Technically difficult and limited study  Mitral Valve: normal, trace physiologic MR.  Aortic Valve/Aorta: Sclerotic trileaflet aortic valve with normal opening.  Tricuspid Valve: Not well-visualized  Pulmonic Valve: Not well-visualized  Left Atrium: normal  Right Atrium: Not well-visualized  Left Ventricle: The left ventricular endocardium is not well-visualized.   Overall normal systolic function with an estimated LVEF of 55%. Cannot   evaluate for segmental abnormalities  Right Ventricle: Not well-visualized  Pericardium: no significant pericardial effusion.  Pulmonary/RV Pressure: estimated PA systolic pressure of 28 mmHg  LV diastolic dysfunction is present    IMPRESSION:  Technically difficult and limited study  The left ventricular endocardium is not well-visualized. Overall normal   systolic function with an estimated LVEF of 55-60%. Cannot evaluate for   segmental abnormalities  The right ventricle is not well-visualized  Sclerotic trileaflet aortic valve, without AI.  Trace physiologic MR  No significant pericardial effusion.    --- End of Report ---      ARISTIDES LEE MD; Attending Cardiologist  This document has been electronically signed. Dec 21 2021  1:38PM    ACC: [id number] EXAM:  XR CHEST AP OR PA 1V                          PROCEDURE DATE:  12/17/2021      INTERPRETATION:  Evaluate for pneumonia    AP chest. Prior 9/3/2021.    Median sternotomy. Normal heart mediastinum. Hyperinflated lungs. No   consolidation or effusion.    IMPRESSION: Hyperinflated lungs. No active infiltrates    --- End of Report ---    RODRIGO FAITH MD; Attending Radiologist  This document has been electronically signed. Dec 18 2021  9:39AM    Ventricular Rate 74 BPM    Atrial Rate 74 BPM    P-R Interval 148 ms    QRS Duration 86 ms    Q-T Interval 368 ms    QTC Calculation(Bazett) 408 ms    P Axis 65 degrees    R Axis 71 degrees    T Axis 62 degrees    Diagnosis Line Normal sinus rhythmwith sinus arrhythmia  Confirmed by MICHAEL BASS ([id number]) on 12/20/2021 12:42:13 PM

## 2021-12-24 NOTE — DIETITIAN INITIAL EVALUATION ADULT. - ORAL INTAKE PTA/DIET HISTORY
patient reports with good PO PTA wife prepares meals NCS diet followed . patient requesting salt on trays . (offered Mrs. Dash and extra pepper)  history HTN noted.  weights stable  allergy to shellfish noted

## 2021-12-24 NOTE — PROGRESS NOTE ADULT - PROBLEM SELECTOR PLAN 3
- CKD 3, Baseline Cr 1.2 - 1.5 as per nephro  - Cr on admission 1.60  - Cr 1.3 today  - Continue fluids  - Hold home losartan  - DASH diet  - hold all nephrotoxic agents

## 2021-12-24 NOTE — PROGRESS NOTE ADULT - PROBLEM SELECTOR PLAN 1
Intramedullary abscess in left femur.   - Pt with left leg pain exacerbated for 1 week. Pain now is resolved.   - h/o femur fracture many years ago with episodes of pain and infection in the old fracture area, had multiple surgeries and drainage with a long term antibiotic treatment, last one years ago.   - CT: Old healed deformity of the mid left femur. Complex heterogeneous lobulated soft  tissue anterior and lateral to the middle two thirds of the left femur concerning for an abscess. New lobulated intramedullary defect with tract extending to the cortical surface in the mid left femur concerning for a possible intramedullary abscess.  - MRI w/w/o IV cont: Multiloculated rim-enhancing abscess along the anterior + lateral aspect of the mid to distal femur. Given the thick rim of enhancement, recommend f/u MRI w/wo contrast after therapy to rule out an underlying mass/neoplasm. Probable intraosseous abscess within the mid to proximal diaphysis of the femur near the superior aspect of the abscess with probable areas of sequestrum, involucrum, and cloaca formation when correlated with recent CT. Findings also concerning for adjacent chronic osteo.  - Afebrile, no leukocytosis.  - Doppler negative for DVT  - BCx x2 NGTD  - Continue cefepime and vanco - adjusted for renal dose. Day #5 (started on 12/18/21)  - ID (Severiano), Ortho consulted,   - D/w IR - plan for drain placement on 12/27/21. Plavix on hold since 12/20. Eliquis was restarted and last dose will be 12/24 pm.  Continue with ASA 81mg daily

## 2021-12-25 LAB
ANION GAP SERPL CALC-SCNC: 7 MMOL/L — SIGNIFICANT CHANGE UP (ref 5–17)
BUN SERPL-MCNC: 30 MG/DL — HIGH (ref 7–23)
CALCIUM SERPL-MCNC: 9.8 MG/DL — SIGNIFICANT CHANGE UP (ref 8.5–10.1)
CHLORIDE SERPL-SCNC: 105 MMOL/L — SIGNIFICANT CHANGE UP (ref 96–108)
CO2 SERPL-SCNC: 30 MMOL/L — SIGNIFICANT CHANGE UP (ref 22–31)
CREAT SERPL-MCNC: 1.4 MG/DL — HIGH (ref 0.5–1.3)
GLUCOSE SERPL-MCNC: 172 MG/DL — HIGH (ref 70–99)
POTASSIUM SERPL-MCNC: 4.2 MMOL/L — SIGNIFICANT CHANGE UP (ref 3.5–5.3)
POTASSIUM SERPL-SCNC: 4.2 MMOL/L — SIGNIFICANT CHANGE UP (ref 3.5–5.3)
SODIUM SERPL-SCNC: 142 MMOL/L — SIGNIFICANT CHANGE UP (ref 135–145)
VANCOMYCIN TROUGH SERPL-MCNC: 13.9 UG/ML — SIGNIFICANT CHANGE UP (ref 10–20)

## 2021-12-25 PROCEDURE — 99232 SBSQ HOSP IP/OBS MODERATE 35: CPT

## 2021-12-25 PROCEDURE — 99233 SBSQ HOSP IP/OBS HIGH 50: CPT | Mod: GC

## 2021-12-25 RX ORDER — IBUPROFEN 200 MG
400 TABLET ORAL ONCE
Refills: 0 | Status: COMPLETED | OUTPATIENT
Start: 2021-12-25 | End: 2021-12-25

## 2021-12-25 RX ORDER — INSULIN LISPRO 100/ML
4 VIAL (ML) SUBCUTANEOUS
Refills: 0 | Status: DISCONTINUED | OUTPATIENT
Start: 2021-12-25 | End: 2021-12-27

## 2021-12-25 RX ORDER — INSULIN GLARGINE 100 [IU]/ML
7 INJECTION, SOLUTION SUBCUTANEOUS AT BEDTIME
Refills: 0 | Status: DISCONTINUED | OUTPATIENT
Start: 2021-12-25 | End: 2021-12-26

## 2021-12-25 RX ADMIN — BUDESONIDE AND FORMOTEROL FUMARATE DIHYDRATE 2 PUFF(S): 160; 4.5 AEROSOL RESPIRATORY (INHALATION) at 05:43

## 2021-12-25 RX ADMIN — TIOTROPIUM BROMIDE 1 CAPSULE(S): 18 CAPSULE ORAL; RESPIRATORY (INHALATION) at 05:44

## 2021-12-25 RX ADMIN — CEFEPIME 100 MILLIGRAM(S): 1 INJECTION, POWDER, FOR SOLUTION INTRAMUSCULAR; INTRAVENOUS at 05:37

## 2021-12-25 RX ADMIN — Medication 650 MILLIGRAM(S): at 07:00

## 2021-12-25 RX ADMIN — Medication 4 MILLIGRAM(S): at 05:35

## 2021-12-25 RX ADMIN — BUDESONIDE AND FORMOTEROL FUMARATE DIHYDRATE 2 PUFF(S): 160; 4.5 AEROSOL RESPIRATORY (INHALATION) at 17:58

## 2021-12-25 RX ADMIN — Medication 81 MILLIGRAM(S): at 11:29

## 2021-12-25 RX ADMIN — Medication 6: at 12:50

## 2021-12-25 RX ADMIN — Medication 650 MILLIGRAM(S): at 05:47

## 2021-12-25 RX ADMIN — Medication 50 MILLIGRAM(S): at 17:58

## 2021-12-25 RX ADMIN — Medication 4: at 08:08

## 2021-12-25 RX ADMIN — Medication 2 UNIT(S): at 08:08

## 2021-12-25 RX ADMIN — ATORVASTATIN CALCIUM 80 MILLIGRAM(S): 80 TABLET, FILM COATED ORAL at 22:05

## 2021-12-25 RX ADMIN — INSULIN GLARGINE 7 UNIT(S): 100 INJECTION, SOLUTION SUBCUTANEOUS at 22:05

## 2021-12-25 RX ADMIN — Medication 2 UNIT(S): at 12:50

## 2021-12-25 RX ADMIN — TAMSULOSIN HYDROCHLORIDE 0.4 MILLIGRAM(S): 0.4 CAPSULE ORAL at 22:05

## 2021-12-25 RX ADMIN — CEFEPIME 100 MILLIGRAM(S): 1 INJECTION, POWDER, FOR SOLUTION INTRAMUSCULAR; INTRAVENOUS at 17:58

## 2021-12-25 RX ADMIN — Medication 400 MILLIGRAM(S): at 12:26

## 2021-12-25 RX ADMIN — Medication 4 UNIT(S): at 16:56

## 2021-12-25 RX ADMIN — Medication 2: at 16:56

## 2021-12-25 RX ADMIN — Medication 400 MILLIGRAM(S): at 11:26

## 2021-12-25 NOTE — CONSULT NOTE ADULT - CONSULT REQUESTED DATE/TIME
18-Dec-2021 06:16
18-Dec-2021 10:48
18-Dec-2021 13:25
21-Dec-2021 14:50
18-Dec-2021 14:29
18-Dec-2021 15:22
25-Dec-2021 12:48

## 2021-12-25 NOTE — PROGRESS NOTE ADULT - PROBLEM SELECTOR PLAN 1
Intramedullary abscess in left femur.   - Pt with left leg pain exacerbated for 1 week. Pain now is resolved.   - h/o femur fracture many years ago with episodes of pain and infection in the old fracture area, had multiple surgeries and drainage with a long term antibiotic treatment, last one years ago.   - CT: Old healed deformity of the mid left femur. Complex heterogeneous lobulated soft  tissue anterior and lateral to the middle two thirds of the left femur concerning for an abscess. New lobulated intramedullary defect with tract extending to the cortical surface in the mid left femur concerning for a possible intramedullary abscess.  - MRI w/w/o IV cont: Multiloculated rim-enhancing abscess along the anterior + lateral aspect of the mid to distal femur. Given the thick rim of enhancement, recommend f/u MRI w/wo contrast after therapy to rule out an underlying mass/neoplasm. Probable intraosseous abscess within the mid to proximal diaphysis of the femur near the superior aspect of the abscess with probable areas of sequestrum, involucrum, and cloaca formation when correlated with recent CT. Findings also concerning for adjacent chronic osteo.  - Afebrile, no leukocytosis.  - Doppler negative for DVT  - BCx x2 NGTD  - Continue cefepime and vanco - adjusted for renal dose. Day #6 (started on 12/18/21)  - ID (Severiano), Ortho consulted,   - D/w IR - plan for drain placement on 12/27/21. Plavix on hold since 12/20. Eliquis was restarted and last dose will be 12/24 pm.  Continue with ASA 81mg daily

## 2021-12-25 NOTE — PROGRESS NOTE ADULT - SUBJECTIVE AND OBJECTIVE BOX
YUE     PLV 1EAS 107 W1    Allergies    No Known Allergies    Intolerances    shellfish (Nausea)      PAST MEDICAL & SURGICAL HISTORY:  Diabetes Mellitus, Type II    CAD (Coronary Artery Disease)  s/p 3v CABG ; [facility]    Dyslipidemia    Osteomyelitis    COPD (chronic obstructive pulmonary disease)  on 2L at home and BiPAP at night; intubated     Hypertension    PVD (peripheral vascular disease)    History of PAT (paroxysmal atrial tachycardia)    CABG (Coronary Artery Bypass Graft)      Compound fracture  left leg    S/P primary angioplasty with coronary stent        FAMILY HISTORY:  Family history of diabetes mellitus (Sibling)    Family hx of lung cancer  brother,  age 82, used to smoke with pt        Home Medications:  azithromycin 250 mg oral tablet: 1 tab(s) orally Monday, Wednesday, and Friday (18 Dec 2021 01:56)  Breo Ellipta 200 mcg-25 mcg/inh inhalation powder: 1 puff(s) inhaled once a day (18 Dec 2021 01:56)  Incruse Ellipta 62.5 mcg/inh inhalation powder: 1 puff(s) inhaled every 24 hours (18 Dec 2021 01:56)  Jardiance 25 mg oral tablet: 1 tab(s) orally once a day (in the morning) (18 Dec 2021 01:56)  losartan 25 mg oral tablet: 1 tab(s) orally once a day (18 Dec 2021 01:56)  metFORMIN 1000 mg oral tablet: 1 tab(s) orally 2 times a day w/food  please resume the dose on 08/10/2021  (18 Dec 2021 01:56)  NovoLOG FlexPen 100 units/mL injectable solution: Inject 5 units at BS over 200, 10 units at BS over 300, and 15 units at BS over 400 (18 Dec 2021 01:56)  Nucala 100 mg subcutaneous injection: 100 milligram(s) subcutaneous every 4 weeks    *Last given 21* (18 Dec 2021 01:56)  predniSONE 2 mg oral delayed release tablet: 2 tab(s) orally once a day (18 Dec 2021 01:56)  rosuvastatin 20 mg oral tablet: 1 tab(s) orally once a day (at bedtime) (18 Dec 2021 01:56)  tamsulosin 0.4 mg oral capsule: 1 cap(s) orally once a day (at bedtime) (18 Dec 2021 01:56)      MEDICATIONS  (STANDING):  aspirin enteric coated 81 milliGRAM(s) Oral daily  atorvastatin 80 milliGRAM(s) Oral at bedtime  azithromycin   Tablet 250 milliGRAM(s) Oral <User Schedule>  budesonide 160 MICROgram(s)/formoterol 4.5 MICROgram(s) Inhaler 2 Puff(s) Inhalation two times a day  cefepime   IVPB 2000 milliGRAM(s) IV Intermittent every 12 hours  dextrose 40% Gel 15 Gram(s) Oral once  dextrose 5%. 1000 milliLiter(s) (50 mL/Hr) IV Continuous <Continuous>  dextrose 5%. 1000 milliLiter(s) (100 mL/Hr) IV Continuous <Continuous>  dextrose 50% Injectable 25 Gram(s) IV Push once  dextrose 50% Injectable 12.5 Gram(s) IV Push once  dextrose 50% Injectable 25 Gram(s) IV Push once  glucagon  Injectable 1 milliGRAM(s) IntraMuscular once  insulin lispro (ADMELOG) corrective regimen sliding scale   SubCutaneous three times a day before meals  insulin lispro (ADMELOG) corrective regimen sliding scale   SubCutaneous at bedtime  insulin lispro Injectable (ADMELOG) 2 Unit(s) SubCutaneous three times a day before meals  metoprolol tartrate 50 milliGRAM(s) Oral two times a day  predniSONE   Tablet 4 milliGRAM(s) Oral daily  tamsulosin 0.4 milliGRAM(s) Oral at bedtime  tiotropium 18 MICROgram(s) Capsule 1 Capsule(s) Inhalation daily    MEDICATIONS  (PRN):  acetaminophen     Tablet .. 650 milliGRAM(s) Oral every 6 hours PRN Mild Pain (1 - 3), Moderate Pain (4 - 6)  melatonin 5 milliGRAM(s) Oral at bedtime PRN Insomnia      Diet, Consistent Carbohydrate w/Evening Snack:   DASH/TLC Sodium & Cholesterol Restricted (21 @ 17:43) [Active]          Vital Signs Last 24 Hrs  T(C): 36.3 (25 Dec 2021 06:33), Max: 37 (24 Dec 2021 20:47)  T(F): 97.3 (25 Dec 2021 06:33), Max: 98.6 (24 Dec 2021 20:47)  HR: 81 (25 Dec 2021 06:33) (76 - 87)  BP: 108/66 (25 Dec 2021 06:33) (102/63 - 122/71)  BP(mean): --  RR: 18 (25 Dec 2021 06:33) (18 - 18)  SpO2: 97% (25 Dec 2021 06:33) (94% - 98%)      21 @ 07:01  -  21 @ 07:00  --------------------------------------------------------  IN: 170 mL / OUT: 700 mL / NET: -530 mL              LABS:                        11.9   6.46  )-----------( 348      ( 24 Dec 2021 09:26 )             38.7         140  |  105  |  25<H>  ----------------------------<  234<H>  3.8   |  28  |  1.30    Ca    9.6      24 Dec 2021 09:26    TPro  6.8  /  Alb  2.8<L>  /  TBili  0.3  /  DBili  x   /  AST  9<L>  /  ALT  15  /  AlkPhos  92                WBC:  WBC Count: 6.46 K/uL ( @ 09:26)  WBC Count: 7.41 K/uL ( @ 07:11)  WBC Count: 6.97 K/uL ( @ 04:56)      MICROBIOLOGY:  RECENT CULTURES:                  Sodium:  Sodium, Serum: 140 mmol/L (:)  Sodium, Serum: 140 mmol/L ( @ 07:11)  Sodium, Serum: 141 mmol/L ( @ 04:56)      1.30 mg/dL :  1.40 mg/dL  07:11  1.50 mg/dL  @ 04:56      Hemoglobin:  Hemoglobin: 11.9 g/dL ( @ 09:26)  Hemoglobin: 12.2 g/dL ( @ 07:11)  Hemoglobin: 11.7 g/dL ( 04:56)      Platelets: Platelet Count - Automated: 348 K/uL ( @ 09:26)  Platelet Count - Automated: 351 K/uL ( @ 07:11)  Platelet Count - Automated: 340 K/uL ( @ 04:56)      LIVER FUNCTIONS - ( 24 Dec 2021 09:26 )  Alb: 2.8 g/dL / Pro: 6.8 g/dL / ALK PHOS: 92 U/L / ALT: 15 U/L / AST: 9 U/L / GGT: x                 RADIOLOGY & ADDITIONAL STUDIES:      MICROBIOLOGY:  RECENT CULTURES:

## 2021-12-25 NOTE — PROGRESS NOTE ADULT - PROBLEM SELECTOR PLAN 5
- chronic  - hold home metformin and Jardiance  - Continue MDSS   - hypoglycemia protocol, accuchecks   - A1c 7.6  - Persistent elevated glucose over 200's including fasting  - Lantus 5u at bedtime ordered

## 2021-12-25 NOTE — PROGRESS NOTE ADULT - ASSESSMENT
82yo male with pmhx DM2, CAD s/p 3v CABG 2004, stents in 2019, HLD, HTN, PAT(on Eliquis)?, PVD(s/p stents in b/l legs -- right leg in 2020), osteomyelitis, COPD on prn home o2 and nocturnal bipap, type 2 Dm who presented to ED with increasing left leg pain x 1 week.  Now found to have w/ osteomyelitis, concern for intramedullary abscess    TTE 8/3/21 w/ hypokinesis mild DD   EKG, NSR no ischemic changes noted     CAD/HTN/HLD/PAT/pre-post cardiac optimization   - Planned for IR drain/placement (12/27).  He remains stable at this point  - Continue to hold Plavix (x5 days) for IR procedure  - Continue ASA as per chart review no need to hold prior to procedure  - Hold Eliquis (for PVD?) x 48 hr before procedure.  Please, resume both Plavix and Eliquis post procedure per Surgery  - BP controlled and stable.  Hold anti-HTN meds for now.  No evidence of volume overload or ischemic symptoms  - Continue BB and statin  - Monitor and replete Lytes. Keep K > 4 and Mg > 2    - Pt has no active ischemia, decompensated heart failure, unstable arrythmia, or severe stenotic valvular disease. Patient remains optimized from cardiovascular standpoint to proceed with planned procedure with routine hemodynamic monitoring.     - Will continue to follow.    Dena Maynard DNP, NP-C  Cardiology   Spectra #[phone]

## 2021-12-25 NOTE — CONSULT NOTE ADULT - PROBLEM SELECTOR RECOMMENDATION 9
increase lantus 10 units qhs  increase admelog 4 units 3x/day before meals  cont mod dose admelog corrective scale coverage qac/qhs  cont cons cho diet  goal bg 100-180 in hosp setting

## 2021-12-25 NOTE — PROGRESS NOTE ADULT - ASSESSMENT
The patient is an 80yo male with pmhx CAD s/p 3v CABG 2004, stents in 2019, HLD, HTN, PAT(on Eliquis), PVD(s/p stents in b/l legs -- right leg in 2020), osteomyelitis, COPD on prn home o2 and nocturnal bipap, type 2 Dm  presents to ED with leg pain x 1 week , found to have CT findings c/w abscess in left femur now with concern for  abscess with chr om  .

## 2021-12-25 NOTE — PROGRESS NOTE ADULT - PROBLEM SELECTOR PLAN 8
- pt with remote hx of PAT vs PAF; diagnosed in [facility] within the last year  - c/w home metoprolol  - on home Eliquis 5mg BID; last dose AM of 12/17. Hold for possible OR intervention of intramedullary abscess   - EKG: SR with premature supraventricular complexes

## 2021-12-25 NOTE — PROGRESS NOTE ADULT - ATTENDING COMMENTS
-there is no evidence of acute ischemia.  -there is no evidence of significant arrhythmia.  -there is no evidence for meaningful  volume overload.  -optimized for low risk procedure with ir

## 2021-12-25 NOTE — PROGRESS NOTE ADULT - ASSESSMENT
COMMODORE TURNER 81 m 2021 [facility] P [phone]  1940  VERNON ESPINOZA    REVIEW OF SYMPTOMS      Able to give ROS  Yes     RELIABLE +/-   CONSTITUTIONAL Weakness Yes  Chills No   ENDOCRINE  No heat or cold intolerance    ALLERGY No hives  Sore throat No stridor  RESP Coughing blood no  Shortness of breath YES   NEURO No Headache  Confusion Pain neck No   CARDIAC No Chest pain No Palpitations   GI  Pain abdomen NO   Vomiting NO     PHYSICAL EXAM    HEENT Unremarkable  atraumatic   RESP Fair air entry EXP prolonged    Harsh breath sound Resp distres mild   CARDIAC S1 S2 No S3     NO JVD    ABDOMEN SOFT BS PRESENT NOT DISTENDED No hepatosplenomegaly PEDAL EDEMA present No calf tenderness  NO rash       ________________  PATIENT PRESENTATION.   82yo man with PMH of HTN, COPD on home O2, CAD s/p CABG, PVD s/p stents in b/l legs and left femoral fracture more than 50 years ago, was admitted 2021 with left leg pain on the site of old fracture after CT showed possible intramedullary abscess. He has had pain and infection in the old fracture area at least 3-4 times in the past, had multiple surgeries and drainage of area with a long term antibiotic treatment, last one years ago.   Most likely he is here now  with another infection and osteomyelitis with collection in area that needs intervention by ortho. Is on coverage empirically with 4th Gen cephalosporins and vancomycin until  culture info.   Doppler neg for DVTs  Admission WBC normal, no fever  ESR=60     CRP=70  Pt admitted  Pulm consulted     ________________  HOSPITAL COURSE.   OM Femur   Cefepime 2.2    Vanco 1.5/d   COPD on home O2  PREDNISONE Decreased 2 2021  NOCT BPAP   RESP FAILURE    PMH   COPD ASTHMA Former smoker 2019 FVC 2.20 58% Post 2.61 69% +18 FEV1   0.86 31% Post 0.89 32% +4%  FEV1% 39%     INTUBATION CAC 2018   CAD sp cabg pmh  PVD stents pmh   l FEM FX YR AGO With local episodic infectn    _____                            GOC. 2021 Full code   COVID STATUS.   ICU STAY. none  ABIO.    AZITHRO q MWF    Cefepime 2.2    Vanco 1.5/d          BEST PRACTICE ISSUES.                                                  HEAD OF BED ELEVATION. Yes  DVT PROPHYLAXIS.    2021 apixaba 5.2 (a f)                                      MCCORMICK PROPHYLAXIS.                                                                                         DIET.     cons carb                       INFECTION PROPHYLAXIS.    SPEECH SWALLOW RECOMMENDATIONS.     ________________  HOSPITAL COURSE.   OM Femur   Cefepime 2.2    Vanco 1.5/d   COPD on home O2  PREDNISONE Decreased 2 2021  NOCT BPAP   RESP FAILURE    PMH   COPD ASTHMA Former smoker 2019 FVC 2.20 58% Post 2.61 69% +18 FEV1   0.86 31% Post 0.89 32% +4%  FEV1% 39%     INTUBATION CAC 2018   CAD sp cabg pmh  PVD stents pmh   l FEM FX YR AGO With local episodic infectn    _____                            GOC. 2021 Full code   COVID STATUS.   ICU STAY. none  ABIO.    AZITHRO q MWF    Cefepime 2.2    Vanco 1.5/d        PATIENT DATA   VITALS/PO/IO/VENT/DRIPS.   2021 afeb 80 100/60   2021 ra 93%     ASSESSMENT/RECOMMENDATIONS.    RESP.   is on noct bpap    HEMODYNAMICS.   bp ok    VANCO t   --2021 VT 11.7-13 - 15     INFECTION.   Possible osteomyelitis   w -2021 w 6.5 - 6.9   mr femur with contr   Multiloculated nm enhancing abscess along ant and lateral aspect of mid to distal femur cw osteomyelitis   blod c  (-)    AZITHRO q MWF    Cefepime 2.2    Vanco 1.5/d     COPD   symbicort 160    pred 4 -> 2021 pred 2   2021 change dw spouse    symbicor   under control    CAD.   metoprolol 50.2    asa 81     CHF   lasix 40     ANEMIA  Hb 2021 Hb 11   Monitor     CKD  Cr --2021 Cr 1.4- 1.5-1.3  Monitor       TIME SPENT   Over 25 minutes aggregate care time spent on encounter; activities included   direct patient care, counseling and/or coordinating care reviewing notes, lab data/ imaging , discussion with multidisciplinary team/ patient  /family and explaining in detail risks, benefits, alternatives  of the recommendations     COMMODORE TURNER 81 m 2021 [facility] P [phone]  1940  VERNON ESPINOZA

## 2021-12-25 NOTE — PROGRESS NOTE ADULT - SUBJECTIVE AND OBJECTIVE BOX
Patient is a 81y old  Male who presents with a chief complaint of increasing left leg pain (24 Dec 2021 12:38)    2021 12:38)    Intramedullary abscess in left femur possible   ir  guided  drain   INTERVAL HPI/OVERNIGHT EVENTS: Patient seen and examined at bedside- alert awake   aware ir guided drain monday Denies fevers, chills, headache, lightheadedness, chest pain, dyspnea,  no leg pain.     INTERVAL HPI/OVERNIGHT EVENTS:     T(C): 36.3 (12-25-21 @ 06:33), Max: 37 (12-24-21 @ 20:47)  HR: 81 (12-25-21 @ 06:33) (76 - 84)  BP: 108/66 (12-25-21 @ 06:33) (108/66 - 122/71)  RR: 18 (12-25-21 @ 06:33) (18 - 18)  SpO2: 97% (12-25-21 @ 06:33) (94% - 98%)  Wt(kg): --  I&O's Summary    24 Dec 2021 07:01  -  25 Dec 2021 07:00  --------------------------------------------------------  IN: 170 mL / OUT: 700 mL / NET: -530 mL      REVIEW OF SYSTEMS:  CONSTITUTIONAL: No fever or chills  HEENT:  No headache, no sore throat  RESPIRATORY: No cough, wheezing, or shortness of breath  CARDIOVASCULAR: No chest pain, palpitations  GASTROINTESTINAL: No abd pain, nausea, vomiting, or diarrhea. + constipation.   GENITOURINARY: No dysuria, frequency, or hematuria  NEUROLOGICAL: no focal weakness or dizziness  MUSCULOSKELETAL: no myalgias, no LLE pain.       GENERAL: NAD,   HEAD:  Atraumatic  EYES: EOMI, PERRLA, conjunctiva and sclera clear  ENT: Moist mucous membranes  NECK: Supple, No JVD  CHEST/LUNG:  auscultation bilaterally. No rales, rhonchi, wheezing,.   HEART: Regular rate and rhythm. No murmurs, no tachy   ABDOMEN: Bowel sounds present. Soft, Nontender, Nondistended.  EXTREMITIES: 2+ Peripheral Pulses. No clubbing, cyanosis, or edema.   No pain, tenderness in LLE, full ROM.   NERVOUS SYSTEM:  Alert & Oriented X3, motor /sensory intact   SKIN: scar in distal lateral thigh  +       MEDICATIONS  (STANDING):  aspirin enteric coated 81 milliGRAM(s) Oral daily  atorvastatin 80 milliGRAM(s) Oral at bedtime  azithromycin   Tablet 250 milliGRAM(s) Oral <User Schedule>  budesonide 160 MICROgram(s)/formoterol 4.5 MICROgram(s) Inhaler 2 Puff(s) Inhalation two times a day  cefepime   IVPB 2000 milliGRAM(s) IV Intermittent every 12 hours  dextrose 40% Gel 15 Gram(s) Oral once  dextrose 5%. 1000 milliLiter(s) (50 mL/Hr) IV Continuous <Continuous>  dextrose 5%. 1000 milliLiter(s) (100 mL/Hr) IV Continuous <Continuous>  dextrose 50% Injectable 25 Gram(s) IV Push once  dextrose 50% Injectable 12.5 Gram(s) IV Push once  dextrose 50% Injectable 25 Gram(s) IV Push once  glucagon  Injectable 1 milliGRAM(s) IntraMuscular once  insulin glargine Injectable (LANTUS) 7 Unit(s) SubCutaneous at bedtime  insulin lispro (ADMELOG) corrective regimen sliding scale   SubCutaneous three times a day before meals  insulin lispro (ADMELOG) corrective regimen sliding scale   SubCutaneous at bedtime  insulin lispro Injectable (ADMELOG) 4 Unit(s) SubCutaneous three times a day before meals  metoprolol tartrate 50 milliGRAM(s) Oral two times a day  predniSONE   Tablet 2 milliGRAM(s) Oral daily  tamsulosin 0.4 milliGRAM(s) Oral at bedtime  tiotropium 18 MICROgram(s) Capsule 1 Capsule(s) Inhalation daily    MEDICATIONS  (PRN):  acetaminophen     Tablet .. 650 milliGRAM(s) Oral every 6 hours PRN Mild Pain (1 - 3), Moderate Pain (4 - 6)  melatonin 5 milliGRAM(s) Oral at bedtime PRN Insomnia      LABS:                        11.9   6.46  )-----------( 348      ( 24 Dec 2021 09:26 )             38.7     12-24    140  |  105  |  25<H>  ----------------------------<  234<H>  3.8   |  28  |  1.30    Ca    9.6      24 Dec 2021 09:26    TPro  6.8  /  Alb  2.8<L>  /  TBili  0.3  /  DBili  x   /  AST  9<L>  /  ALT  15  /  AlkPhos  92  12-24        CAPILLARY BLOOD GLUCOSE      POCT Blood Glucose.: 251 mg/dL (25 Dec 2021 12:02)  POCT Blood Glucose.: 204 mg/dL (25 Dec 2021 07:58)  POCT Blood Glucose.: 203 mg/dL (24 Dec 2021 21:47)  POCT Blood Glucose.: 192 mg/dL (24 Dec 2021 16:37)              RADIOLOGY & ADDITIONAL TESTS:    Imaging Personally Reviewed:     no new test   Advance Directives:  full code     Palliative Care:  Appropriate

## 2021-12-25 NOTE — CONSULT NOTE ADULT - SUBJECTIVE AND OBJECTIVE BOX
Patient is a 81y old  Male who presents with a chief complaint of increasing left leg pain (24 Dec 2021 12:38)      Reason For Consult: dm2 uncontrolled    HPI:  The patient is an 80yo male with pmhx CAD s/p 3v CABG , stents in , HLD, HTN, PAT(on Eliquis), PVD(s/p stents in b/l legs -- right leg in ), osteomyelitis, COPD on prn home o2 and nocturnal bipap, type 2 Dm who presented to ED with increasing left leg pain x 1 week. States it had started a few weeks prior, but was intermittent and not as painful. Does admits to bumping his leg on a table a few times. Describes his pain as worse with movement and when pressure is applied. Denies rash, itching, fevers, chills, n/v/d, bleeding, cp, sob, sick contacts, numbness, tingling, new focal weakness, decreased ROM of LLE. Has been ambulating with a cane 2/2 pain. Of note has history of osteomyelitis of LLE and has has multiple operations on that leg.    In the ED cbc, cmp, coags, lacate,  CT femur , LLE duplex and covid pcr were obtained. significant for bun/cr 26/1.6, glucose 162, album 2.9, ca 10.2, lactate 1.9. covid pcr negative. US No evidence of left lower extremity deep venous thrombosis. CT revealed Old healed deformity of the mid left femur, Complex heterogeneous lobulated soft tissue anterior and lateral to the middle two thirds of the left femur   concerning for an abscess. New lobulated intramedullary defect with tract   extending to the cortical surface in the mid left femur concerning for a   possible intramedullary abscess.   Pt was given vanc & zosyn x 1, tylenol 650mg x 1.  (18 Dec 2021 00:18)      PAST MEDICAL & SURGICAL HISTORY:  Diabetes Mellitus, Type II    CAD (Coronary Artery Disease)  s/p 3v CABG ; stents placed in [place] in     Dyslipidemia    Osteomyelitis    COPD (chronic obstructive pulmonary disease)  on 2L at home and BiPAP at night; intubated     Hypertension    PVD (peripheral vascular disease)    History of PAT (paroxysmal atrial tachycardia)    CABG (Coronary Artery Bypass Graft)      Compound fracture  left leg    S/P primary angioplasty with coronary stent        FAMILY HISTORY:  Family history of diabetes mellitus (Sibling)    Family hx of lung cancer  brother,  age 82, used to smoke with pt          Social History:    MEDICATIONS  (STANDING):  aspirin enteric coated 81 milliGRAM(s) Oral daily  atorvastatin 80 milliGRAM(s) Oral at bedtime  azithromycin   Tablet 250 milliGRAM(s) Oral <User Schedule>  budesonide 160 MICROgram(s)/formoterol 4.5 MICROgram(s) Inhaler 2 Puff(s) Inhalation two times a day  cefepime   IVPB 2000 milliGRAM(s) IV Intermittent every 12 hours  dextrose 40% Gel 15 Gram(s) Oral once  dextrose 5%. 1000 milliLiter(s) (50 mL/Hr) IV Continuous <Continuous>  dextrose 5%. 1000 milliLiter(s) (100 mL/Hr) IV Continuous <Continuous>  dextrose 50% Injectable 25 Gram(s) IV Push once  dextrose 50% Injectable 12.5 Gram(s) IV Push once  dextrose 50% Injectable 25 Gram(s) IV Push once  glucagon  Injectable 1 milliGRAM(s) IntraMuscular once  insulin glargine Injectable (LANTUS) 7 Unit(s) SubCutaneous at bedtime  insulin lispro (ADMELOG) corrective regimen sliding scale   SubCutaneous three times a day before meals  insulin lispro (ADMELOG) corrective regimen sliding scale   SubCutaneous at bedtime  insulin lispro Injectable (ADMELOG) 2 Unit(s) SubCutaneous three times a day before meals  metoprolol tartrate 50 milliGRAM(s) Oral two times a day  predniSONE   Tablet 2 milliGRAM(s) Oral daily  tamsulosin 0.4 milliGRAM(s) Oral at bedtime  tiotropium 18 MICROgram(s) Capsule 1 Capsule(s) Inhalation daily    MEDICATIONS  (PRN):  acetaminophen     Tablet .. 650 milliGRAM(s) Oral every 6 hours PRN Mild Pain (1 - 3), Moderate Pain (4 - 6)  melatonin 5 milliGRAM(s) Oral at bedtime PRN Insomnia        T(C): 36.3 (21 @ 06:33), Max: 37 (21 @ 20:47)  HR: 81 (21 @ 06:33) (76 - 87)  BP: 108/66 (21 @ 06:33) (102/63 - 122/71)  RR: 18 (21 @ 06:33) (18 - 18)  SpO2: 97% (-- @ 06:33) (94% - 98%)  Wt(kg): --    PHYSICAL EXAM:  CHEST/LUNG: Clear to percussion bilaterally; No rales, rhonchi, wheezing, or rubs  HEART: Regular rate and rhythm; No murmurs, rubs, or gallops  ABDOMEN: Soft, Nontender, Nondistended; Bowel sounds present  EXTREMITIES:  2+ Peripheral Pulses, No clubbing, cyanosis, or edema  SKIN: No rashes or lesions    CAPILLARY BLOOD GLUCOSE      POCT Blood Glucose.: 251 mg/dL (25 Dec 2021 12:02)  POCT Blood Glucose.: 204 mg/dL (25 Dec 2021 07:58)  POCT Blood Glucose.: 203 mg/dL (24 Dec 2021 21:47)  POCT Blood Glucose.: 192 mg/dL (24 Dec 2021 16:37)                            11.9   6.46  )-----------( 348      ( 24 Dec 2021 09:26 )             38.7       CMP:  - @ 09:26  SGPT 15  Albumin 2.8   Alk Phos 92   Anion Gap 7   SGOT 9   Total Bili 0.3   BUN 25   Calcium Total 9.6   CO2 28   Chloride 105   Creatinine 1.30   eGFR if AA 59   eGFR if non AA 51   Glucose 234   Potassium 3.8   Protein 6.8   Sodium 140      Thyroid Function Tests:      Diabetes Tests:       Radiology:

## 2021-12-25 NOTE — PROGRESS NOTE ADULT - PROBLEM SELECTOR PLAN 4
- chronic  - on home 2L NC PRN; continue as needed / on chr po prednisone decrease to 2 mg po daily.  - Today normal sat at RA   - BIPAP at night; will continue with home settings  - c/w home azithro M W, F  - c/w home montelukast  - Pt on home breo ellipta and Incruse ellipta -- therapeutic interchange with Symbicort and tiotropium

## 2021-12-25 NOTE — PROGRESS NOTE ADULT - PROBLEM SELECTOR PLAN 3
- CKD 3, Baseline Cr 1.2 - 1.5 as per nephro  - Cr on admission 1.60 ,  Cr 1.3 today  - Continue fluids  - Hold home losartan  - DASH diet  - hold all nephrotoxic agents

## 2021-12-25 NOTE — PROGRESS NOTE ADULT - ATTENDING COMMENTS
pt seen and examine today  see above plan -Intramedullary abscess in left femur - hold Plavix  ,  procedure and continue asa no need to hold for ir guided drain    ir guided Monday   s/p vanco   now  cefepime 2 gm q12 hr  , 250 mg  Zithromax ,   blood cult neg todate   , hold on Eliquis     . pt seen and examine today  see above plan -Intramedullary abscess in left femur  with om- hold Plavix  ,  procedure and continue asa no need to hold for ir guided drain    ir guided Monday   s/p vanco   now  cefepime 2 gm q12 hr  , 250 mg  Zithromax ,   blood cult neg todate   , hold on Eliquis     .

## 2021-12-26 LAB
ANION GAP SERPL CALC-SCNC: 7 MMOL/L — SIGNIFICANT CHANGE UP (ref 5–17)
BASOPHILS # BLD AUTO: 0.03 K/UL — SIGNIFICANT CHANGE UP (ref 0–0.2)
BASOPHILS NFR BLD AUTO: 0.5 % — SIGNIFICANT CHANGE UP (ref 0–2)
BUN SERPL-MCNC: 28 MG/DL — HIGH (ref 7–23)
CALCIUM SERPL-MCNC: 9.4 MG/DL — SIGNIFICANT CHANGE UP (ref 8.5–10.1)
CHLORIDE SERPL-SCNC: 106 MMOL/L — SIGNIFICANT CHANGE UP (ref 96–108)
CO2 SERPL-SCNC: 28 MMOL/L — SIGNIFICANT CHANGE UP (ref 22–31)
CREAT SERPL-MCNC: 1.3 MG/DL — SIGNIFICANT CHANGE UP (ref 0.5–1.3)
EOSINOPHIL # BLD AUTO: 0.04 K/UL — SIGNIFICANT CHANGE UP (ref 0–0.5)
EOSINOPHIL NFR BLD AUTO: 0.6 % — SIGNIFICANT CHANGE UP (ref 0–6)
GLUCOSE SERPL-MCNC: 225 MG/DL — HIGH (ref 70–99)
HCT VFR BLD CALC: 35.6 % — LOW (ref 39–50)
HGB BLD-MCNC: 10.9 G/DL — LOW (ref 13–17)
IMM GRANULOCYTES NFR BLD AUTO: 1 % — SIGNIFICANT CHANGE UP (ref 0–1.5)
LYMPHOCYTES # BLD AUTO: 1 K/UL — SIGNIFICANT CHANGE UP (ref 1–3.3)
LYMPHOCYTES # BLD AUTO: 16.1 % — SIGNIFICANT CHANGE UP (ref 13–44)
MCHC RBC-ENTMCNC: 26.6 PG — LOW (ref 27–34)
MCHC RBC-ENTMCNC: 30.6 GM/DL — LOW (ref 32–36)
MCV RBC AUTO: 86.8 FL — SIGNIFICANT CHANGE UP (ref 80–100)
MONOCYTES # BLD AUTO: 0.76 K/UL — SIGNIFICANT CHANGE UP (ref 0–0.9)
MONOCYTES NFR BLD AUTO: 12.2 % — SIGNIFICANT CHANGE UP (ref 2–14)
NEUTROPHILS # BLD AUTO: 4.34 K/UL — SIGNIFICANT CHANGE UP (ref 1.8–7.4)
NEUTROPHILS NFR BLD AUTO: 69.6 % — SIGNIFICANT CHANGE UP (ref 43–77)
NRBC # BLD: 0 /100 WBCS — SIGNIFICANT CHANGE UP (ref 0–0)
PLATELET # BLD AUTO: 311 K/UL — SIGNIFICANT CHANGE UP (ref 150–400)
POTASSIUM SERPL-MCNC: 3.9 MMOL/L — SIGNIFICANT CHANGE UP (ref 3.5–5.3)
POTASSIUM SERPL-SCNC: 3.9 MMOL/L — SIGNIFICANT CHANGE UP (ref 3.5–5.3)
RBC # BLD: 4.1 M/UL — LOW (ref 4.2–5.8)
RBC # FLD: 13.2 % — SIGNIFICANT CHANGE UP (ref 10.3–14.5)
SODIUM SERPL-SCNC: 141 MMOL/L — SIGNIFICANT CHANGE UP (ref 135–145)
WBC # BLD: 6.23 K/UL — SIGNIFICANT CHANGE UP (ref 3.8–10.5)
WBC # FLD AUTO: 6.23 K/UL — SIGNIFICANT CHANGE UP (ref 3.8–10.5)

## 2021-12-26 PROCEDURE — 99233 SBSQ HOSP IP/OBS HIGH 50: CPT

## 2021-12-26 PROCEDURE — 99232 SBSQ HOSP IP/OBS MODERATE 35: CPT

## 2021-12-26 RX ORDER — VANCOMYCIN HCL 1 G
1500 VIAL (EA) INTRAVENOUS EVERY 24 HOURS
Refills: 0 | Status: DISCONTINUED | OUTPATIENT
Start: 2021-12-26 | End: 2021-12-29

## 2021-12-26 RX ORDER — INSULIN GLARGINE 100 [IU]/ML
10 INJECTION, SOLUTION SUBCUTANEOUS AT BEDTIME
Refills: 0 | Status: DISCONTINUED | OUTPATIENT
Start: 2021-12-26 | End: 2021-12-30

## 2021-12-26 RX ADMIN — Medication 6: at 12:10

## 2021-12-26 RX ADMIN — Medication 81 MILLIGRAM(S): at 12:11

## 2021-12-26 RX ADMIN — CEFEPIME 100 MILLIGRAM(S): 1 INJECTION, POWDER, FOR SOLUTION INTRAMUSCULAR; INTRAVENOUS at 17:09

## 2021-12-26 RX ADMIN — BUDESONIDE AND FORMOTEROL FUMARATE DIHYDRATE 2 PUFF(S): 160; 4.5 AEROSOL RESPIRATORY (INHALATION) at 05:56

## 2021-12-26 RX ADMIN — CEFEPIME 100 MILLIGRAM(S): 1 INJECTION, POWDER, FOR SOLUTION INTRAMUSCULAR; INTRAVENOUS at 05:55

## 2021-12-26 RX ADMIN — Medication 2 MILLIGRAM(S): at 05:55

## 2021-12-26 RX ADMIN — INSULIN GLARGINE 10 UNIT(S): 100 INJECTION, SOLUTION SUBCUTANEOUS at 22:44

## 2021-12-26 RX ADMIN — Medication 4 UNIT(S): at 17:09

## 2021-12-26 RX ADMIN — Medication 4: at 08:18

## 2021-12-26 RX ADMIN — Medication 4 UNIT(S): at 12:11

## 2021-12-26 RX ADMIN — Medication 300 MILLIGRAM(S): at 12:11

## 2021-12-26 RX ADMIN — BUDESONIDE AND FORMOTEROL FUMARATE DIHYDRATE 2 PUFF(S): 160; 4.5 AEROSOL RESPIRATORY (INHALATION) at 20:30

## 2021-12-26 RX ADMIN — Medication 650 MILLIGRAM(S): at 06:30

## 2021-12-26 RX ADMIN — Medication 2: at 17:08

## 2021-12-26 RX ADMIN — TIOTROPIUM BROMIDE 1 CAPSULE(S): 18 CAPSULE ORAL; RESPIRATORY (INHALATION) at 05:56

## 2021-12-26 RX ADMIN — TAMSULOSIN HYDROCHLORIDE 0.4 MILLIGRAM(S): 0.4 CAPSULE ORAL at 22:47

## 2021-12-26 RX ADMIN — Medication 650 MILLIGRAM(S): at 20:28

## 2021-12-26 RX ADMIN — Medication 4 UNIT(S): at 08:20

## 2021-12-26 RX ADMIN — Medication 5 MILLIGRAM(S): at 22:47

## 2021-12-26 RX ADMIN — Medication 650 MILLIGRAM(S): at 05:59

## 2021-12-26 RX ADMIN — ATORVASTATIN CALCIUM 80 MILLIGRAM(S): 80 TABLET, FILM COATED ORAL at 22:48

## 2021-12-26 RX ADMIN — Medication 650 MILLIGRAM(S): at 21:20

## 2021-12-26 NOTE — PROGRESS NOTE ADULT - SUBJECTIVE AND OBJECTIVE BOX
Patient is a 81y old  Male who presents with a chief complaint of increasing left leg pain (24 Dec 2021 12:38)    Subjective:  INTERVAL HPI/OVERNIGHT EVENTS: Patient seen and examined at bedside. No overnight events occurred. Patient has no complaints at this time. Tolerating PO, BM 2 days ago, passing gas. No leg pain. Sleeping good with BiPAP overnight. Denies fevers, chills, headache, lightheadedness, chest pain, dyspnea, abdominal pain.     MEDICATIONS  (STANDING):  aspirin enteric coated 81 milliGRAM(s) Oral daily  atorvastatin 80 milliGRAM(s) Oral at bedtime  azithromycin   Tablet 250 milliGRAM(s) Oral <User Schedule>  budesonide 160 MICROgram(s)/formoterol 4.5 MICROgram(s) Inhaler 2 Puff(s) Inhalation two times a day  cefepime   IVPB 2000 milliGRAM(s) IV Intermittent every 12 hours  dextrose 40% Gel 15 Gram(s) Oral once  dextrose 5%. 1000 milliLiter(s) (50 mL/Hr) IV Continuous <Continuous>  dextrose 5%. 1000 milliLiter(s) (100 mL/Hr) IV Continuous <Continuous>  dextrose 50% Injectable 25 Gram(s) IV Push once  dextrose 50% Injectable 12.5 Gram(s) IV Push once  dextrose 50% Injectable 25 Gram(s) IV Push once  glucagon  Injectable 1 milliGRAM(s) IntraMuscular once  insulin glargine Injectable (LANTUS) 10 Unit(s) SubCutaneous at bedtime  insulin lispro (ADMELOG) corrective regimen sliding scale   SubCutaneous three times a day before meals  insulin lispro (ADMELOG) corrective regimen sliding scale   SubCutaneous at bedtime  insulin lispro Injectable (ADMELOG) 4 Unit(s) SubCutaneous three times a day before meals  metoprolol tartrate 50 milliGRAM(s) Oral two times a day  predniSONE   Tablet 2 milliGRAM(s) Oral daily  tamsulosin 0.4 milliGRAM(s) Oral at bedtime  tiotropium 18 MICROgram(s) Capsule 1 Capsule(s) Inhalation daily  vancomycin  IVPB 1500 milliGRAM(s) IV Intermittent every 24 hours    MEDICATIONS  (PRN):  acetaminophen     Tablet .. 650 milliGRAM(s) Oral every 6 hours PRN Mild Pain (1 - 3), Moderate Pain (4 - 6)  melatonin 5 milliGRAM(s) Oral at bedtime PRN Insomnia      Allergies  No Known Allergies    Intolerances    shellfish (Nausea)      REVIEW OF SYSTEMS:  CONSTITUTIONAL: No fever or chills  HEENT:  No headache, no sore throat  RESPIRATORY: No cough, wheezing, or shortness of breath  CARDIOVASCULAR: No chest pain, palpitations  GASTROINTESTINAL: No abd pain, nausea, vomiting, or diarrhea  GENITOURINARY: No dysuria, frequency, or hematuria  NEUROLOGICAL: no focal weakness or dizziness  MUSCULOSKELETAL: no myalgias     Objective:  Vital Signs Last 24 Hrs  T(C): 36.3 (26 Dec 2021 12:54), Max: 36.8 (25 Dec 2021 14:02)  T(F): 97.4 (26 Dec 2021 12:54), Max: 98.2 (25 Dec 2021 14:02)  HR: 76 (26 Dec 2021 12:54) (68 - 85)  BP: 118/77 (26 Dec 2021 12:54) (98/59 - 118/77)  RR: 16 (26 Dec 2021 12:54) (16 - 18)  SpO2: 96% (26 Dec 2021 12:54) (93% - 97%)    GENERAL: NAD, lying in bed comfortably  HEAD:  Atraumatic  EYES: EOMI, PERRLA, conjunctiva and sclera clear  ENT: Moist mucous membranes.  NECK: Supple, No JVD  CHEST/LUNG: Clear to auscultation bilaterally; No rales, rhonchi, wheezing. Unlabored respirations  HEART: Regular rate and rhythm; No murmurs.  ABDOMEN: Bowel sounds present. Soft, Nontender, Nondistended.  EXTREMITIES:  2+ Peripheral Pulses. No clubbing, cyanosis, or edema. No pain, no limited ROM in left LE  NERVOUS SYSTEM:  Alert & Oriented X3, speech clear. No acute deficits   SKIN: old scar left leg.     LABS:                        10.9   6.23  )-----------( 311      ( 26 Dec 2021 08:29 )             35.6     CBC Full  -  ( 26 Dec 2021 08:29 )  WBC Count : 6.23 K/uL  Hemoglobin : 10.9 g/dL  Hematocrit : 35.6 %  Platelet Count - Automated : 311 K/uL  Mean Cell Volume : 86.8 fl  Mean Cell Hemoglobin : 26.6 pg  Mean Cell Hemoglobin Concentration : 30.6 gm/dL  Auto Neutrophil # : 4.34 K/uL  Auto Lymphocyte # : 1.00 K/uL  Auto Monocyte # : 0.76 K/uL  Auto Eosinophil # : 0.04 K/uL  Auto Basophil # : 0.03 K/uL  Auto Neutrophil % : 69.6 %  Auto Lymphocyte % : 16.1 %  Auto Monocyte % : 12.2 %  Auto Eosinophil % : 0.6 %  Auto Basophil % : 0.5 %    26 Dec 2021 08:29    141    |  106    |  28     ----------------------------<  225    3.9     |  28     |  1.30     Ca    9.4        26 Dec 2021 08:29        CAPILLARY BLOOD GLUCOSE    POCT Blood Glucose.: 258 mg/dL (26 Dec 2021 11:34)  POCT Blood Glucose.: 233 mg/dL (26 Dec 2021 08:15)  POCT Blood Glucose.: 214 mg/dL (25 Dec 2021 22:03)  POCT Blood Glucose.: 159 mg/dL (25 Dec 2021 16:51)            Personally reviewed.     Consultant(s) Notes Reviewed:  [x] YES  [ ] NO

## 2021-12-26 NOTE — PROGRESS NOTE ADULT - PROBLEM SELECTOR PLAN 4
- chronic  - on home 2L NC PRN; continue as needed.  Pt has been sat >90% at RA.   - on chronic po prednisone, decreased to 2 mg po daily 12/25  - BIPAP at night; will continue with home settings  - c/w home azithro M, W, F  - c/w home montelukast  - Pt on home breo ellipta and Incruse ellipta -- therapeutic interchange with Symbicort and tiotropium

## 2021-12-26 NOTE — PROGRESS NOTE ADULT - ASSESSMENT
80yo male with pmhx DM2, CAD s/p 3v CABG 2004, stents in 2019, HLD, HTN, PAT(on Eliquis)?, PVD(s/p stents in b/l legs -- right leg in 2020), osteomyelitis, COPD on prn home o2 and nocturnal bipap, type 2 Dm who presented to ED with increasing left leg pain x 1 week.  Now found to have w/ osteomyelitis, concern for intramedullary abscess    TTE 8/3/21 w/ hypokinesis mild DD   EKG, NSR no ischemic changes noted     CAD/HTN/HLD/PAT/pre-post cardiac optimization   - For IR drain/placement in am.  He remains stable from cardiac standpoint  - Continue to hold Plavix for IR procedure  - Continue ASA as per chart review no need to hold prior to procedure  - Continue to hold Eliquis (for PVD?) before procedure.  Please, resume both Plavix and Eliquis post procedure per Surgery  - BP controlled and stable with one episode of hypotension.  Hold anti-HTN meds for now.  No evidence of volume overload or ischemic symptoms  - Continue BB and statin  - Monitor and replete Lytes. Keep K > 4 and Mg > 2    - Pt has no active ischemia, decompensated heart failure, unstable arrythmia, or severe stenotic valvular disease. Patient remains optimized from cardiovascular standpoint to proceed with planned procedure with routine hemodynamic monitoring.     - Will continue to follow.    Dena Maynard DNP, NP-C  Cardiology   Spectra #[phone]

## 2021-12-26 NOTE — PROGRESS NOTE ADULT - PROBLEM SELECTOR PLAN 3
- CKD 3, Baseline Cr 1.2 - 1.5 as per nephro  - Cr on admission 1.60   - Downtrend Cr 1.3 today.  - Hold home losartan  - DASH diet  - Hold all nephrotoxic agents

## 2021-12-26 NOTE — PROGRESS NOTE ADULT - PROBLEM SELECTOR PLAN 12
- DVT Prophylaxis: SCD's.  For IR procedure Plavix last dose 12/19, Eliquis last dose 12/24.  On ASA 81mg daily.

## 2021-12-26 NOTE — PROGRESS NOTE ADULT - ASSESSMENT
COMMODORE TURNER 81 m 2021 [facility] P [phone]  1940  VERNON ESPINOZA    REVIEW OF SYMPTOMS      Able to give ROS  Yes     RELIABLE +/-   CONSTITUTIONAL Weakness Yes  Chills No   ENDOCRINE  No heat or cold intolerance    ALLERGY No hives  Sore throat No stridor  RESP Coughing blood no  Shortness of breath YES   NEURO No Headache  Confusion Pain neck No   CARDIAC No Chest pain No Palpitations   GI  Pain abdomen NO   Vomiting NO     PHYSICAL EXAM    HEENT Unremarkable  atraumatic   RESP Fair air entry EXP prolonged    Harsh breath sound Resp distres mild   CARDIAC S1 S2 No S3     NO JVD    ABDOMEN SOFT BS PRESENT NOT DISTENDED No hepatosplenomegaly PEDAL EDEMA present No calf tenderness  NO rash       ________________  HOSPITAL COURSE.   OM Femur   Cefepime 2.2    Vanco 1.5/d   COPD on home O2  PREDNISONE Decreased 2 2021  NOCT BPAP   RESP FAILURE    PMH   COPD ASTHMA Former smoker 2019 FVC 2.20 58% Post 2.61 69% +18 FEV1   0.86 31% Post 0.89 32% +4%  FEV1% 39%     INTUBATION CAC 2018   CAD sp cabg pmh  PVD stents pmh   l FEM FX YR AGO With local episodic infectn    _____                            GOC. 2021 Full code   COVID STATUS.   ICU STAY. none  ABIO.    AZITHRO q MWF    Cefepime 2.2    Vanco 1.5/d       BEST PRACTICE ISSUES.                                                  HEAD OF BED ELEVATION. Yes  DVT PROPHYLAXIS.    2021 apixaba 5.2 (a f)                                      MCCORMICK PROPHYLAXIS.                                                                                         DIET.     cons carb                       INFECTION PROPHYLAXIS.      PATIENT DATA   VITALS/PO/IO/VENT/DRIPS.   2021 afeb 76 118/70   2021 ra 94%      ASSESSMENT/RECOMMENDATIONS.    RESP.   is on noct bpap    HEMODYNAMICS.   bp ok    VANCO t   --2021 VT 11.7-13 - 15     INFECTION.   Possible osteomyelitis   w -2021 w 6.5 - 6.9   mr femur with contr   Multiloculated nm enhancing abscess along ant and lateral aspect of mid to distal femur cw osteomyelitis   blod c  (-)    AZITHRO q MWF    Cefepime 2.2    Vanco 1.5/d     COPD   symbicort 160    pred 4 -> 2021 pred 2   2021 change dw spouse    symbicor   under control    CAD.   metoprolol 50.2    asa 81     CHF   lasix 40     ANEMIA  Hb 2021 Hb 11   Monitor     CKD  Cr --2021 Cr 1.4- 1.5-1.3  Monitor     TIME SPENT   Over 25 minutes aggregate care time spent on encounter; activities included   direct patient care, counseling and/or coordinating care reviewing notes, lab data/ imaging , discussion with multidisciplinary team/ patient  /family and explaining in detail risks, benefits, alternatives  of the recommendations     COMMODORE TURNER 81 m 2021 [facility] P [phone]  1940  VERNON ESPINOZA

## 2021-12-26 NOTE — PROGRESS NOTE ADULT - SUBJECTIVE AND OBJECTIVE BOX
YUE     PLV 1EAS 107 W1    Allergies    No Known Allergies    Intolerances    shellfish (Nausea)      PAST MEDICAL & SURGICAL HISTORY:  Diabetes Mellitus, Type II    CAD (Coronary Artery Disease)  s/p 3v CABG ; [facility]    Dyslipidemia    Osteomyelitis    COPD (chronic obstructive pulmonary disease)  on 2L at home and BiPAP at night; intubated     Hypertension    PVD (peripheral vascular disease)    History of PAT (paroxysmal atrial tachycardia)    CABG (Coronary Artery Bypass Graft)      Compound fracture  left leg    S/P primary angioplasty with coronary stent        FAMILY HISTORY:  Family history of diabetes mellitus (Sibling)    Family hx of lung cancer  brother,  age 82, used to smoke with pt        Home Medications:  azithromycin 250 mg oral tablet: 1 tab(s) orally Monday, Wednesday, and Friday (18 Dec 2021 01:56)  Breo Ellipta 200 mcg-25 mcg/inh inhalation powder: 1 puff(s) inhaled once a day (18 Dec 2021 01:56)  Incruse Ellipta 62.5 mcg/inh inhalation powder: 1 puff(s) inhaled every 24 hours (18 Dec 2021 01:56)  Jardiance 25 mg oral tablet: 1 tab(s) orally once a day (in the morning) (18 Dec 2021 01:56)  losartan 25 mg oral tablet: 1 tab(s) orally once a day (18 Dec 2021 01:56)  metFORMIN 1000 mg oral tablet: 1 tab(s) orally 2 times a day w/food  please resume the dose on 08/10/2021  (18 Dec 2021 01:56)  NovoLOG FlexPen 100 units/mL injectable solution: Inject 5 units at BS over 200, 10 units at BS over 300, and 15 units at BS over 400 (18 Dec 2021 01:56)  Nucala 100 mg subcutaneous injection: 100 milligram(s) subcutaneous every 4 weeks    *Last given 21* (18 Dec 2021 01:56)  predniSONE 2 mg oral delayed release tablet: 2 tab(s) orally once a day (18 Dec 2021 01:56)  rosuvastatin 20 mg oral tablet: 1 tab(s) orally once a day (at bedtime) (18 Dec 2021 01:56)  tamsulosin 0.4 mg oral capsule: 1 cap(s) orally once a day (at bedtime) (18 Dec 2021 01:56)      MEDICATIONS  (STANDING):  aspirin enteric coated 81 milliGRAM(s) Oral daily  atorvastatin 80 milliGRAM(s) Oral at bedtime  azithromycin   Tablet 250 milliGRAM(s) Oral <User Schedule>  budesonide 160 MICROgram(s)/formoterol 4.5 MICROgram(s) Inhaler 2 Puff(s) Inhalation two times a day  cefepime   IVPB 2000 milliGRAM(s) IV Intermittent every 12 hours  dextrose 40% Gel 15 Gram(s) Oral once  dextrose 5%. 1000 milliLiter(s) (50 mL/Hr) IV Continuous <Continuous>  dextrose 5%. 1000 milliLiter(s) (100 mL/Hr) IV Continuous <Continuous>  dextrose 50% Injectable 25 Gram(s) IV Push once  dextrose 50% Injectable 12.5 Gram(s) IV Push once  dextrose 50% Injectable 25 Gram(s) IV Push once  glucagon  Injectable 1 milliGRAM(s) IntraMuscular once  insulin glargine Injectable (LANTUS) 10 Unit(s) SubCutaneous at bedtime  insulin lispro (ADMELOG) corrective regimen sliding scale   SubCutaneous three times a day before meals  insulin lispro (ADMELOG) corrective regimen sliding scale   SubCutaneous at bedtime  insulin lispro Injectable (ADMELOG) 4 Unit(s) SubCutaneous three times a day before meals  metoprolol tartrate 50 milliGRAM(s) Oral two times a day  predniSONE   Tablet 2 milliGRAM(s) Oral daily  tamsulosin 0.4 milliGRAM(s) Oral at bedtime  tiotropium 18 MICROgram(s) Capsule 1 Capsule(s) Inhalation daily    MEDICATIONS  (PRN):  acetaminophen     Tablet .. 650 milliGRAM(s) Oral every 6 hours PRN Mild Pain (1 - 3), Moderate Pain (4 - 6)  melatonin 5 milliGRAM(s) Oral at bedtime PRN Insomnia      Diet, Consistent Carbohydrate w/Evening Snack:   DASH/TLC Sodium & Cholesterol Restricted (21 @ 17:43) [Active]          Vital Signs Last 24 Hrs  T(C): 36.7 (26 Dec 2021 05:05), Max: 36.8 (25 Dec 2021 14:02)  T(F): 98 (26 Dec 2021 05:05), Max: 98.2 (25 Dec 2021 14:02)  HR: 71 (26 Dec 2021 06:04) (68 - 85)  BP: 109/67 (26 Dec 2021 06:04) (98/59 - 117/62)  BP(mean): --  RR: 18 (26 Dec 2021 05:05) (17 - 18)  SpO2: 96% (26 Dec 2021 05:05) (93% - 97%)              LABS:                        11.9   6.46  )-----------( 348      ( 24 Dec 2021 09:26 )             38.7         142  |  105  |  30<H>  ----------------------------<  172<H>  4.2   |  30  |  1.40<H>    Ca    9.8      25 Dec 2021 16:45    TPro  6.8  /  Alb  2.8<L>  /  TBili  0.3  /  DBili  x   /  AST  9<L>  /  ALT  15  /  AlkPhos  92                WBC:  WBC Count: 6.46 K/uL ( @ 09:26)  WBC Count: 7.41 K/uL ( @ 07:11)      MICROBIOLOGY:  RECENT CULTURES:                  Sodium:  Sodium, Serum: 142 mmol/L ( @ 16:45)  Sodium, Serum: 140 mmol/L ( @ 09:26)  Sodium, Serum: 140 mmol/L ( @ 07:11)      1.40 mg/dL  @ 16:45  1.30 mg/dL  @ 09:26  1.40 mg/dL  @ 07:11      Hemoglobin:  Hemoglobin: 11.9 g/dL ( @ 09:26)  Hemoglobin: 12.2 g/dL ( @ 07:11)      Platelets: Platelet Count - Automated: 348 K/uL ( @ 09:26)  Platelet Count - Automated: 351 K/uL ( @ 07:11)      LIVER FUNCTIONS - ( 24 Dec 2021 09:26 )  Alb: 2.8 g/dL / Pro: 6.8 g/dL / ALK PHOS: 92 U/L / ALT: 15 U/L / AST: 9 U/L / GGT: x                 RADIOLOGY & ADDITIONAL STUDIES:      MICROBIOLOGY:  RECENT CULTURES:

## 2021-12-26 NOTE — PROGRESS NOTE ADULT - PROBLEM SELECTOR PLAN 8
- pt with remote hx of PAT vs PAF; diagnosed in [facility] within the last year  - c/w home metoprolol  - on home Eliquis 5mg BID; last dose AM of 12/17. Hold for possible OR intervention of intramedullary abscess   - EKG: SR with premature supraventricular complexes

## 2021-12-26 NOTE — PROGRESS NOTE ADULT - PROBLEM SELECTOR PLAN 1
Intramedullary abscess in left femur.   - Pt with left leg pain exacerbated for 1 week. Pain now is resolved.   - h/o femur fracture many years ago with episodes of pain and infection in the old fracture area, had multiple surgeries and drainage with a long term antibiotic treatment, last one years ago.   - CT: Old healed deformity of the mid left femur. Complex heterogeneous lobulated soft  tissue anterior and lateral to the middle two thirds of the left femur concerning for an abscess. New lobulated intramedullary defect with tract extending to the cortical surface in the mid left femur concerning for a possible intramedullary abscess.  - MRI w/w/o IV cont: Multiloculated rim-enhancing abscess along the anterior + lateral aspect of the mid to distal femur. Given the thick rim of enhancement, recommend f/u MRI w/wo contrast after therapy to rule out an underlying mass/neoplasm. Probable intraosseous abscess within the mid to proximal diaphysis of the femur near the superior aspect of the abscess with probable areas of sequestrum, involucrum, and cloaca formation when correlated with recent CT. Findings also concerning for adjacent chronic osteo.  - Afebrile, no leukocytosis.  - Doppler negative for DVT  - BCx x2 NGTD  - Continue cefepime and vanco - adjusted for renal dose. Day #6 (started on 12/18/21)  - ID ([facility]), Ortho following   - D/w IR - plan for drain placement on 12/27/21. Plavix on hold since 12/20. Eliquis last dose 12/24 pm.  On ASA 81mg daily

## 2021-12-26 NOTE — PROGRESS NOTE ADULT - PROBLEM SELECTOR PLAN 5
- Chronic,  HbA1c 7.6  - Hold home metformin and Jardiance  - Continue MDSS   - hypoglycemia protocol, accuchecks   - Persistent elevated glucose over 200's including fasting  - Patient on low dose prednisone for COPD  - Lantus increased from 5 to 10u bedtime and Admelog 4U TID before meals per endocrine   - Continue to monitor.   - Endocrino Dr. Coughlin following.

## 2021-12-26 NOTE — PROGRESS NOTE ADULT - ATTENDING COMMENTS
-there is no evidence of acute ischemia.  -there is no evidence of significant arrhythmia.  -there is no evidence for meaningful  volume overload.  -optimized for planned ir drainage

## 2021-12-26 NOTE — PROGRESS NOTE ADULT - SUBJECTIVE AND OBJECTIVE BOX
[facility] -- Saud Aguilar Grossman, [place], Carroll Bass [place], [place]  Office # [phone]    Follow Up:  CAD s/p CABG/stents, Cardiac Optimization    Subjective/Observations: Remains comfortable on RA.  Denies leg pain but admits to neck pain.  Denies any respiratory or cardiac discomfort    REVIEW OF SYSTEMS: All other review of systems is negative unless indicated above  PAST MEDICAL & SURGICAL HISTORY:  Diabetes Mellitus, Type II    CAD (Coronary Artery Disease)  s/p 3v CABG 2004; stents [place] in [place]    Dyslipidemia    Osteomyelitis    COPD (chronic obstructive pulmonary disease)  on 2L at home and BiPAP at night; intubated 6/18    Hypertension    PVD (peripheral vascular disease)    History of PAT (paroxysmal atrial tachycardia)    CABG (Coronary Artery Bypass Graft)  2004    Compound fracture  left leg    S/P primary angioplasty with coronary stent    MEDICATIONS  (STANDING):  aspirin enteric coated 81 milliGRAM(s) Oral daily  atorvastatin 80 milliGRAM(s) Oral at bedtime  azithromycin   Tablet 250 milliGRAM(s) Oral <User Schedule>  budesonide 160 MICROgram(s)/formoterol 4.5 MICROgram(s) Inhaler 2 Puff(s) Inhalation two times a day  cefepime   IVPB 2000 milliGRAM(s) IV Intermittent every 12 hours  dextrose 40% Gel 15 Gram(s) Oral once  dextrose 5%. 1000 milliLiter(s) (50 mL/Hr) IV Continuous <Continuous>  dextrose 5%. 1000 milliLiter(s) (100 mL/Hr) IV Continuous <Continuous>  dextrose 50% Injectable 25 Gram(s) IV Push once  dextrose 50% Injectable 12.5 Gram(s) IV Push once  dextrose 50% Injectable 25 Gram(s) IV Push once  glucagon  Injectable 1 milliGRAM(s) IntraMuscular once  insulin glargine Injectable (LANTUS) 10 Unit(s) SubCutaneous at bedtime  insulin lispro (ADMELOG) corrective regimen sliding scale   SubCutaneous three times a day before meals  insulin lispro (ADMELOG) corrective regimen sliding scale   SubCutaneous at bedtime  insulin lispro Injectable (ADMELOG) 4 Unit(s) SubCutaneous three times a day before meals  metoprolol tartrate 50 milliGRAM(s) Oral two times a day  predniSONE   Tablet 2 milliGRAM(s) Oral daily  tamsulosin 0.4 milliGRAM(s) Oral at bedtime  tiotropium 18 MICROgram(s) Capsule 1 Capsule(s) Inhalation daily  vancomycin  IVPB 1500 milliGRAM(s) IV Intermittent every 24 hours    MEDICATIONS  (PRN):  acetaminophen     Tablet .. 650 milliGRAM(s) Oral every 6 hours PRN Mild Pain (1 - 3), Moderate Pain (4 - 6)  melatonin 5 milliGRAM(s) Oral at bedtime PRN Insomnia    Allergies    No Known Allergies    Intolerances    shellfish (Nausea)    Vital Signs Last 24 Hrs  T(C): 36.3 (26 Dec 2021 12:54), Max: 36.7 (26 Dec 2021 05:05)  T(F): 97.4 (26 Dec 2021 12:54), Max: 98 (26 Dec 2021 05:05)  HR: 76 (26 Dec 2021 12:54) (68 - 85)  BP: 118/77 (26 Dec 2021 12:54) (98/59 - 118/77)  BP(mean): --  RR: 16 (26 Dec 2021 12:54) (16 - 18)  SpO2: 96% (26 Dec 2021 12:54) (94% - 97%)  I&O's Summary      PHYSICAL EXAM:  TELE: Not on tele  Constitutional: NAD, awake and alert, obese  HEENT: Moist Mucous Membranes, Anicteric  Pulmonary: Non-labored, breath sounds are clear but diminished bilaterally, No wheezing, rales or rhonchi  Cardiovascular: Regular, S1 and S2, No murmurs, rubs, gallops or clicks  Gastrointestinal: Bowel Sounds present, soft, nontender.   Lymph: No peripheral edema. No lymphadenopathy.  Skin: No visible rashes or ulcers.  Psych:  Mood & affect appropriate      LABS: All Labs Reviewed:                        10.9   6.23  )-----------( 311      ( 26 Dec 2021 08:29 )             35.6                         11.9   6.46  )-----------( 348      ( 24 Dec 2021 09:26 )             38.7     26 Dec 2021 08:29    141    |  106    |  28     ----------------------------<  225    3.9     |  28     |  1.30   25 Dec 2021 16:45    142    |  105    |  30     ----------------------------<  172    4.2     |  30     |  1.40   24 Dec 2021 09:26    140    |  105    |  25     ----------------------------<  234    3.8     |  28     |  1.30     Ca    9.4        26 Dec 2021 08:29  Ca    9.8        25 Dec 2021 16:45  Ca    9.6        24 Dec 2021 09:26    TPro  6.8    /  Alb  2.8    /  TBili  0.3    /  DBili  x      /  AST  9      /  ALT  15     /  AlkPhos  92     24 Dec 2021 09:26     EXAM:  ECHO TTE WO CON COMP W DOPP         PROCEDURE DATE:  12/20/2021        INTERPRETATION:  INDICATION: Ischemic heart disease  sonographer KL    Blood Pressure 123/70    Height 180.3 cm     Weight 97.5 kg       BSA 2.2   sq m    Dimensions:  LA 3.0       Normal Values: 2.0 - 4.0 cm  Ao 3.5        Normal Values: 2.0 - 3.8 cm  SEPTUM 1.3       Normal Values: 0.6 - 1.2 cm  PWT 1.0       Normal Values: 0.6 - 1.1 cm  LVIDd 4.3         Normal Values: 3.0 - 5.6 cm  LVIDs 1.8         Normal Values: 1.8 - 4.0 cm      OBSERVATIONS:  Technically difficult and limited study  Mitral Valve: normal, trace physiologic MR.  Aortic Valve/Aorta: Sclerotic trileaflet aortic valve with normal opening.  Tricuspid Valve: Not well-visualized  Pulmonic Valve: Not well-visualized  Left Atrium: normal  Right Atrium: Not well-visualized  Left Ventricle: The left ventricular endocardium is not well-visualized.   Overall normal systolic function with an estimated LVEF of 55%. Cannot   evaluate for segmental abnormalities  Right Ventricle: Not well-visualized  Pericardium: no significant pericardial effusion.  Pulmonary/RV Pressure: estimated PA systolic pressure of 28 mmHg  LV diastolic dysfunction is present    IMPRESSION:  Technically difficult and limited study  The left ventricular endocardium is not well-visualized. Overall normal   systolic function with an estimated LVEF of 55-60%. Cannot evaluate for   segmental abnormalities  The right ventricle is not well-visualized  Sclerotic trileaflet aortic valve, without AI.  Trace physiologic MR  No significant pericardial effusion.    --- End of Report ---      ARISTIDES LEE MD; Attending Cardiologist  This document has been electronically signed. Dec 21 2021  1:38PM    ACC: [id number] EXAM:  XR CHEST AP OR PA 1V                          PROCEDURE DATE:  12/17/2021      INTERPRETATION:  Evaluate for pneumonia    AP chest. Prior 9/3/2021.    Median sternotomy. Normal heart mediastinum. Hyperinflated lungs. No   consolidation or effusion.    IMPRESSION: Hyperinflated lungs. No active infiltrates    --- End of Report ---    RODRIGO FAITH MD; Attending Radiologist  This document has been electronically signed. Dec 18 2021  9:39AM    Ventricular Rate 74 BPM    Atrial Rate 74 BPM    P-R Interval 148 ms    QRS Duration 86 ms    Q-T Interval 368 ms    QTC Calculation(Bazett) 408 ms    P Axis 65 degrees    R Axis 71 degrees    T Axis 62 degrees    Diagnosis Line Normal sinus rhythm with sinus arrhythmia  Confirmed by MICHAEL BASS ([id number]) on 12/20/2021 12:42:13 PM

## 2021-12-27 LAB
ANION GAP SERPL CALC-SCNC: 6 MMOL/L — SIGNIFICANT CHANGE UP (ref 5–17)
APTT BLD: 36.9 SEC — HIGH (ref 27.5–35.5)
BUN SERPL-MCNC: 24 MG/DL — HIGH (ref 7–23)
CALCIUM SERPL-MCNC: 10 MG/DL — SIGNIFICANT CHANGE UP (ref 8.5–10.1)
CHLORIDE SERPL-SCNC: 106 MMOL/L — SIGNIFICANT CHANGE UP (ref 96–108)
CO2 SERPL-SCNC: 29 MMOL/L — SIGNIFICANT CHANGE UP (ref 22–31)
CREAT SERPL-MCNC: 1.3 MG/DL — SIGNIFICANT CHANGE UP (ref 0.5–1.3)
CRP SERPL-MCNC: 9 MG/L — HIGH
ERYTHROCYTE [SEDIMENTATION RATE] IN BLOOD: 25 MM/HR — HIGH (ref 0–20)
GLUCOSE SERPL-MCNC: 195 MG/DL — HIGH (ref 70–99)
HCT VFR BLD CALC: 37.5 % — LOW (ref 39–50)
HGB BLD-MCNC: 11.4 G/DL — LOW (ref 13–17)
INR BLD: 1.12 RATIO — SIGNIFICANT CHANGE UP (ref 0.88–1.16)
MCHC RBC-ENTMCNC: 26.5 PG — LOW (ref 27–34)
MCHC RBC-ENTMCNC: 30.4 GM/DL — LOW (ref 32–36)
MCV RBC AUTO: 87 FL — SIGNIFICANT CHANGE UP (ref 80–100)
NRBC # BLD: 0 /100 WBCS — SIGNIFICANT CHANGE UP (ref 0–0)
PLATELET # BLD AUTO: 342 K/UL — SIGNIFICANT CHANGE UP (ref 150–400)
POTASSIUM SERPL-MCNC: 4 MMOL/L — SIGNIFICANT CHANGE UP (ref 3.5–5.3)
POTASSIUM SERPL-SCNC: 4 MMOL/L — SIGNIFICANT CHANGE UP (ref 3.5–5.3)
PROTHROM AB SERPL-ACNC: 13 SEC — SIGNIFICANT CHANGE UP (ref 10.6–13.6)
RBC # BLD: 4.31 M/UL — SIGNIFICANT CHANGE UP (ref 4.2–5.8)
RBC # FLD: 13.2 % — SIGNIFICANT CHANGE UP (ref 10.3–14.5)
SODIUM SERPL-SCNC: 141 MMOL/L — SIGNIFICANT CHANGE UP (ref 135–145)
WBC # BLD: 6.63 K/UL — SIGNIFICANT CHANGE UP (ref 3.8–10.5)
WBC # FLD AUTO: 6.63 K/UL — SIGNIFICANT CHANGE UP (ref 3.8–10.5)

## 2021-12-27 PROCEDURE — 99232 SBSQ HOSP IP/OBS MODERATE 35: CPT

## 2021-12-27 PROCEDURE — 99233 SBSQ HOSP IP/OBS HIGH 50: CPT | Mod: GC

## 2021-12-27 RX ADMIN — AZITHROMYCIN 250 MILLIGRAM(S): 500 TABLET, FILM COATED ORAL at 06:03

## 2021-12-27 RX ADMIN — Medication 300 MILLIGRAM(S): at 12:42

## 2021-12-27 RX ADMIN — TAMSULOSIN HYDROCHLORIDE 0.4 MILLIGRAM(S): 0.4 CAPSULE ORAL at 22:15

## 2021-12-27 RX ADMIN — Medication 2 MILLIGRAM(S): at 06:03

## 2021-12-27 RX ADMIN — CEFEPIME 100 MILLIGRAM(S): 1 INJECTION, POWDER, FOR SOLUTION INTRAMUSCULAR; INTRAVENOUS at 06:03

## 2021-12-27 RX ADMIN — Medication 2: at 17:52

## 2021-12-27 RX ADMIN — TIOTROPIUM BROMIDE 1 CAPSULE(S): 18 CAPSULE ORAL; RESPIRATORY (INHALATION) at 10:00

## 2021-12-27 RX ADMIN — Medication 50 MILLIGRAM(S): at 06:03

## 2021-12-27 RX ADMIN — Medication 2: at 22:31

## 2021-12-27 RX ADMIN — Medication 650 MILLIGRAM(S): at 15:41

## 2021-12-27 RX ADMIN — Medication 4: at 08:03

## 2021-12-27 RX ADMIN — CEFEPIME 100 MILLIGRAM(S): 1 INJECTION, POWDER, FOR SOLUTION INTRAMUSCULAR; INTRAVENOUS at 17:51

## 2021-12-27 RX ADMIN — BUDESONIDE AND FORMOTEROL FUMARATE DIHYDRATE 2 PUFF(S): 160; 4.5 AEROSOL RESPIRATORY (INHALATION) at 10:00

## 2021-12-27 RX ADMIN — INSULIN GLARGINE 10 UNIT(S): 100 INJECTION, SOLUTION SUBCUTANEOUS at 22:17

## 2021-12-27 RX ADMIN — ATORVASTATIN CALCIUM 80 MILLIGRAM(S): 80 TABLET, FILM COATED ORAL at 22:15

## 2021-12-27 NOTE — PROGRESS NOTE ADULT - PROBLEM SELECTOR PLAN 5
- Chronic,  HbA1c 7.6  - Hold home metformin and Jardiance  - Continue MDSS   - hypoglycemia protocol, accuchecks   - Persistent elevated glucose over 200's including fasting  - Patient on low dose prednisone for COPD  - Lantus increased from 5 to 10u bedtime and Admelog 4U TID before meals per endocrine   - Continue to monitor, patient has been NPO for procedure today.   - Endocrino Dr. Coughlin following.

## 2021-12-27 NOTE — PROGRESS NOTE ADULT - SUBJECTIVE AND OBJECTIVE BOX
YUE     PLV 1EAS 107 W1    Allergies    No Known Allergies    Intolerances    shellfish (Nausea)      PAST MEDICAL & SURGICAL HISTORY:  Diabetes Mellitus, Type II    CAD (Coronary Artery Disease)  s[facility]p 3[facility] CABG ; [facility]    Dyslipidemia    Osteomyelitis    COPD (chronic obstructive pulmonary disease)  on 2L at home and BiPAP at night; intubated     Hypertension    PVD (peripheral vascular disease)    History of PAT (paroxysmal atrial tachycardia)    CABG (Coronary Artery Bypass Graft)      Compound fracture  left leg    S/P primary angioplasty with coronary stent        FAMILY HISTORY:  Family history of diabetes mellitus (Sibling)    Family hx of lung cancer  brother,  age 82, used to smoke with pt        Home Medications:  azithromycin 250 mg oral tablet: 1 tab(s) orally Monday, Wednesday, and Friday (18 Dec 2021 01:56)  Breo Ellipta 200 mcg-25 mcg/inh inhalation powder: 1 puff(s) inhaled once a day (18 Dec 2021 01:56)  Incruse Ellipta 62.5 mcg/inh inhalation powder: 1 puff(s) inhaled every 24 hours (18 Dec 2021 01:56)  Jardiance 25 mg oral tablet: 1 tab(s) orally once a day (in the morning) (18 Dec 2021 01:56)  losartan 25 mg oral tablet: 1 tab(s) orally once a day (18 Dec 2021 01:56)  metFORMIN 1000 mg oral tablet: 1 tab(s) orally 2 times a day w/food  please resume the dose on 08/10/2021  (18 Dec 2021 01:56)  NovoLOG FlexPen 100 units/mL injectable solution: Inject 5 units at BS over 200, 10 units at BS over 300, and 15 units at BS over 400 (18 Dec 2021 01:56)  Nucala 100 mg subcutaneous injection: 100 milligram(s) subcutaneous every 4 weeks    *Last given 21* (18 Dec 2021 01:56)  predniSONE 2 mg oral delayed release tablet: 2 tab(s) orally once a day (18 Dec 2021 01:56)  rosuvastatin 20 mg oral tablet: 1 tab(s) orally once a day (at bedtime) (18 Dec 2021 01:56)  tamsulosin 0.4 mg oral capsule: 1 cap(s) orally once a day (at bedtime) (18 Dec 2021 01:56)      MEDICATIONS  (STANDING):  aspirin enteric coated 81 milliGRAM(s) Oral daily  atorvastatin 80 milliGRAM(s) Oral at bedtime  azithromycin   Tablet 250 milliGRAM(s) Oral <User Schedule>  budesonide 160 MICROgram(s)/formoterol 4.5 MICROgram(s) Inhaler 2 Puff(s) Inhalation two times a day  cefepime   IVPB 2000 milliGRAM(s) IV Intermittent every 12 hours  dextrose 40% Gel 15 Gram(s) Oral once  dextrose 5%. 1000 milliLiter(s) (50 mL/Hr) IV Continuous <Continuous>  dextrose 5%. 1000 milliLiter(s) (100 mL/Hr) IV Continuous <Continuous>  dextrose 50% Injectable 25 Gram(s) IV Push once  dextrose 50% Injectable 12.5 Gram(s) IV Push once  dextrose 50% Injectable 25 Gram(s) IV Push once  glucagon  Injectable 1 milliGRAM(s) IntraMuscular once  insulin glargine Injectable (LANTUS) 10 Unit(s) SubCutaneous at bedtime  insulin lispro (ADMELOG) corrective regimen sliding scale   SubCutaneous three times a day before meals  insulin lispro (ADMELOG) corrective regimen sliding scale   SubCutaneous at bedtime  insulin lispro Injectable (ADMELOG) 4 Unit(s) SubCutaneous three times a day before meals  metoprolol tartrate 50 milliGRAM(s) Oral two times a day  predniSONE   Tablet 2 milliGRAM(s) Oral daily  tamsulosin 0.4 milliGRAM(s) Oral at bedtime  tiotropium 18 MICROgram(s) Capsule 1 Capsule(s) Inhalation daily  vancomycin  IVPB 1500 milliGRAM(s) IV Intermittent every 24 hours    MEDICATIONS  (PRN):  acetaminophen     Tablet .. 650 milliGRAM(s) Oral every 6 hours PRN Mild Pain (1 - 3), Moderate Pain (4 - 6)  melatonin 5 milliGRAM(s) Oral at bedtime PRN Insomnia      Diet, NPO after Midnight:      NPO Start Date: 26-Dec-2021,   NPO Start Time: 23:59  Except Medications (21 @ 16:48) [Active]  Diet, Consistent Carbohydrate w/Evening Snack:   DASH/TLC Sodium & Cholesterol Restricted (21 @ 17:43) [Active]          Vital Signs Last 24 Hrs  T(C): 36.2 (27 Dec 2021 04:50), Max: 36.8 (26 Dec 2021 21:33)  T(F): 97.2 (27 Dec 2021 04:50), Max: 98.3 (26 Dec 2021 21:33)  HR: 80 (27 Dec 2021 05:34) (72 - 84)  BP: 123/70 (27 Dec 2021 04:50) (118/77 - 123/71)  BP(mean): --  RR: 20 (27 Dec 2021 04:50) (16 - 20)  SpO2: 98% (27 Dec 2021 05:34) (94% - 98%)              LABS:                        11.4   6.63  )-----------( 342      ( 27 Dec 2021 08:22 )             37.5         141  |  106  |  24<H>  ----------------------------<  195<H>  4.0   |  29  |  1.30    Ca    10.0      27 Dec 2021 08:22      PT/INR - ( 27 Dec 2021 08:22 )   PT: 13.0 sec;   INR: 1.12 ratio         PTT - ( 27 Dec 2021 08:22 )  PTT:36.9 sec          WBC:  WBC Count: 6.63 K/uL ( @ 08:22)  WBC Count: 6.23 K/uL ( @ 08:29)  WBC Count: 6.46 K/uL ( @ 09:26)      MICROBIOLOGY:  RECENT CULTURES:              PT/INR - ( 27 Dec 2021 08:22 )   PT: 13.0 sec;   INR: 1.12 ratio         PTT - ( 27 Dec 2021 08:22 )  PTT:36.9 sec    Sodium:  Sodium, Serum: 141 mmol/L ( @ 08:22)  Sodium, Serum: 141 mmol/L ( @ 08:29)  Sodium, Serum: 142 mmol/L ( @ 16:45)  Sodium, Serum: 140 mmol/L ( @ 09:26)      1.30 mg/dL  @ 08:22  1.30 mg/dL  @ 08:29  1.40 mg/dL  @ 16:45  1.30 mg/dL  @ 09:26      Hemoglobin:  Hemoglobin: 11.4 g/dL ( @ 08:22)  Hemoglobin: 10.9 g/dL ( @ 08:29)  Hemoglobin: 11.9 g/dL ( @ 09:26)      Platelets: Platelet Count - Automated: 342 K/uL ( @ 08:22)  Platelet Count - Automated: 311 K/uL ( @ 08:29)  Platelet Count - Automated: 348 K/uL ( @ 09:26)              RADIOLOGY & ADDITIONAL STUDIES:      MICROBIOLOGY:  RECENT CULTURES:

## 2021-12-27 NOTE — PROGRESS NOTE ADULT - PROBLEM SELECTOR PLAN 9
- CAD s/p 3v CABG 2004, stents in 2019  - continue beta blocker   - D/w IR and cardio. Hold Plavix and eliquis, and continue ASA 81 mg daily in setting of IR procedure - CAD s/p 3v CABG 2004, stents in 2019  - continue beta blocker   - D/w IR and cardio. Hold Plavix and eliquis, and continue ASA 81 mg daily in setting of IR procedure  - Will continue Plavix and Eliquis according with IR recs after procedure.

## 2021-12-27 NOTE — PROGRESS NOTE ADULT - PROBLEM SELECTOR PLAN 1
Intramedullary abscess in left femur.   - Pt with left leg pain exacerbated for 1 week. Pain now is resolved.   - h/o femur fracture many years ago with episodes of pain and infection in the old fracture area, had multiple surgeries and drainage with a long term antibiotic treatment, last one years ago.   - CT: Old healed deformity of the mid left femur. Complex heterogeneous lobulated soft  tissue ant and lat to the mid two thirds of the lf femur concerning for an abscess. New lobulated intramedullary defect with tract extending to the cortical surface in the mid left femur concerning for a possible intramedullary abscess.  - MRI w/w/o IV cont: Multiloculated rim-enhancing abscess along the anterior + lateral aspect of the mid to distal femur. Rec. f/u MRI w/wo contrast after therapy to r/o underlying mass/neoplasm. Probable intraosseous abscess within mid to proximal femur diaphysis near the superior aspect of the abscess, probable areas of sequestrum, involucrum, and cloaca formation when correlated with CT. Findings also concerning for adjacent chronic osteo.  - Afebrile, no leukocytosis.  - Doppler negative for DVT  - BCx x2 negative final.   - Continue cefepime and vanco - adjusted for renal dose. Day #9 (started on 12/18/21)  - ID ([place]), Ortho following   - IR - plan for drain placement today.

## 2021-12-27 NOTE — PRE PROCEDURE NOTE - PRE PROCEDURE EVALUATION
Interventional Radiology Pre-Procedure Note    81y old  Male who presents with a chief complaint of increasing left leg pain found to have left femur abscess.  Referred for drainage.  Will drain largest perifemoral locule.  Collection will likely not be drained completely due to multiloculated nature of the fluid.  This was discussed with Dr. Tse from Orthopedics on 12/27/2021 at 8:45AM.  Patient consented for procedure with anesthesia. Plavix and eliquis held for procedure.    PAST MEDICAL & SURGICAL HISTORY:  Diabetes Mellitus, Type II    CAD (Coronary Artery Disease)  s/p 3v CABG 2004; stents placed [place] in 2019    Dyslipidemia    Osteomyelitis    COPD (chronic obstructive pulmonary disease)  on 2L at home and BiPAP at night; intubated 6/18    Hypertension    PVD (peripheral vascular disease)    History of PAT (paroxysmal atrial tachycardia)    CABG (Coronary Artery Bypass Graft)  2004    Compound fracture  left leg    S/P primary angioplasty with coronary stent         CBC Full  -  ( 27 Dec 2021 08:22 )  WBC Count : 6.63 K/uL  RBC Count : 4.31 M/uL  Hemoglobin : 11.4 g/dL  Hematocrit : 37.5 %  Platelet Count - Automated : 342 K/uL  Mean Cell Volume : 87.0 fl  Mean Cell Hemoglobin : 26.5 pg  Mean Cell Hemoglobin Concentration : 30.4 gm/dL  Auto Neutrophil # : x  Auto Lymphocyte # : x  Auto Monocyte # : x  Auto Eosinophil # : x  Auto Basophil # : x  Auto Neutrophil % : x  Auto Lymphocyte % : x  Auto Monocyte % : x  Auto Eosinophil % : x  Auto Basophil % : x    12-27    141  |  106  |  24<H>  ----------------------------<  195<H>  4.0   |  29  |  1.30    Ca    10.0      27 Dec 2021 08:22      PT/INR - ( 27 Dec 2021 08:22 )   PT: 13.0 sec;   INR: 1.12 ratio         PTT - ( 27 Dec 2021 08:22 )  PTT:36.9 sec    Interventional Radiology Attending Physician:    81y old  Male who presents with a chief complaint of increasing left leg pain found to have left femur abscess.  Referred for drainage.  Will drain largest perifemoral locule.  Collection will likely not be drained completely due to multiloculated nature of the fluid.  This was discussed with Dr. Tse from Orthopedics on 12/27/2021 at 8:45AM.  Patient consented for procedure with anesthesia. Plavix and eliquis held for procedure.

## 2021-12-27 NOTE — PROGRESS NOTE ADULT - PROBLEM SELECTOR PLAN 10
- PVD s/p stents in b/l legs -- right leg in 2020  - Hold Plavix and continue ASA 81 mg daily in setting of planned procedure - PVD s/p stents in b/l legs -- right leg in 2020  - Hold Plavix and continue ASA 81 mg daily in setting of planned procedure  - Will continue Plavix and Eliquis according with IR recs after procedure, ideally tomorrow.

## 2021-12-27 NOTE — PROGRESS NOTE ADULT - PROBLEM SELECTOR PLAN 3
- CKD 3, Baseline Cr 1.2 - 1.5 as per nephro  - Cr on admission 1.60   - Cr downtrend and normal. 1.3 today.  - Hold home losartan  - DASH diet  - Hold all nephrotoxic agents

## 2021-12-27 NOTE — PROGRESS NOTE ADULT - ATTENDING COMMENTS
pt seen and examine today  see above plan - pt Intramedullary abscess in left femur  with om- hold Plavix  ,  procedure and continue asa no need to hold for ir guided drain    ir guided  Tuesday ,      s/p vanco   now  cefepime 2 gm q12 hr  , 250 mg  Zithromax ,   blood cult neg todate   , hold on Eliquis     .

## 2021-12-27 NOTE — PROGRESS NOTE ADULT - PROBLEM SELECTOR PLAN 12
- DVT Prophylaxis: SCD's.  For IR procedure Plavix last dose 12/19, Eliquis last dose 12/24.  On ASA 81mg daily. - DVT Prophylaxis: SCD's for now.  For IR procedure Plavix last dose 12/19, Eliquis last dose 12/24.  On ASA 81mg daily.   Will continue Plavix and Eliquis according with IR recs after procedure.

## 2021-12-27 NOTE — PROGRESS NOTE ADULT - ASSESSMENT
80yo male with pmhx DM2, CAD s/p 3v CABG 2004, stents in 2019, HLD, HTN, PAT(on Eliquis)?, PVD(s/p stents in b/l legs -- right leg in 2020), osteomyelitis, COPD on prn home o2 and nocturnal bipap, type 2 Dm who presented to ED with increasing left leg pain x 1 week.  Now found to have w/ osteomyelitis, concern for intramedullary abscess    TTE 8/3/21 w/ hypokinesis mild DD   EKG, NSR no ischemic changes noted     CAD/HTN/HLD/PAT/pre-post cardiac optimization   - For IR drain/placement in am.  He remains stable from cardiac standpoint  - Continue to hold Plavix for IR procedure  - Continue ASA as per chart review no need to hold prior to procedure  - Continue to hold Eliquis (for PVD?) before procedure.  Please, resume both Plavix and Eliquis post procedure per Surgery  - BP controlled and stable.  Hold anti-HTN meds for now.  No evidence of volume overload or ischemic symptoms  - Continue BB and statin  - Monitor and replete Lytes. Keep K > 4 and Mg > 2    - Pt has no active ischemia, decompensated heart failure, unstable arrythmia, or severe stenotic valvular disease. Patient remains optimized from cardiovascular standpoint to proceed with planned procedure with routine hemodynamic monitoring.     - Will continue to follow.    Radha Landis MS ANP, [facility]  Nurse Practitioner- Cardiology   Spectra #[phone]  - Will continue to follow.

## 2021-12-27 NOTE — PROGRESS NOTE ADULT - SUBJECTIVE AND OBJECTIVE BOX
[facility] -- Saud Aguilar Grossman, [place], Carroll Haney, Carolina Heath: Office # [phone]    Follow Up:  CAD s/p CABG/stents, Cardiac Optimization    Subjective/Observations: Patient seen and examined. Patient awake, alert, resting comfortably in bed. No complaints of chest pain, dyspnea, palpitations or dizziness. Tolerating room air.     REVIEW OF SYSTEMS: All review of systems is negative for eye, ENT, GI, , allergic, dermatologic, musculoskeletal and neurologic except as described above    PAST MEDICAL & SURGICAL HISTORY:  Diabetes Mellitus, Type II    CAD (Coronary Artery Disease)  s/p 3v CABG 2004; [facility]    Dyslipidemia    Osteomyelitis    COPD (chronic obstructive pulmonary disease)  on 2L at home and BiPAP at night; intubated 6/18    Hypertension    PVD (peripheral vascular disease)    History of PAT (paroxysmal atrial tachycardia)    CABG (Coronary Artery Bypass Graft)  2004    Compound fracture  left leg    S/P primary angioplasty with coronary stent        MEDICATIONS  (STANDING):  aspirin enteric coated 81 milliGRAM(s) Oral daily  atorvastatin 80 milliGRAM(s) Oral at bedtime  azithromycin   Tablet 250 milliGRAM(s) Oral <User Schedule>  budesonide 160 MICROgram(s)/formoterol 4.5 MICROgram(s) Inhaler 2 Puff(s) Inhalation two times a day  cefepime   IVPB 2000 milliGRAM(s) IV Intermittent every 12 hours  dextrose 40% Gel 15 Gram(s) Oral once  dextrose 5%. 1000 milliLiter(s) (50 mL/Hr) IV Continuous <Continuous>  dextrose 5%. 1000 milliLiter(s) (100 mL/Hr) IV Continuous <Continuous>  dextrose 50% Injectable 25 Gram(s) IV Push once  dextrose 50% Injectable 12.5 Gram(s) IV Push once  dextrose 50% Injectable 25 Gram(s) IV Push once  glucagon  Injectable 1 milliGRAM(s) IntraMuscular once  insulin glargine Injectable (LANTUS) 10 Unit(s) SubCutaneous at bedtime  insulin lispro (ADMELOG) corrective regimen sliding scale   SubCutaneous three times a day before meals  insulin lispro (ADMELOG) corrective regimen sliding scale   SubCutaneous at bedtime  insulin lispro Injectable (ADMELOG) 4 Unit(s) SubCutaneous three times a day before meals  metoprolol tartrate 50 milliGRAM(s) Oral two times a day  predniSONE   Tablet 2 milliGRAM(s) Oral daily  tamsulosin 0.4 milliGRAM(s) Oral at bedtime  tiotropium 18 MICROgram(s) Capsule 1 Capsule(s) Inhalation daily  vancomycin  IVPB 1500 milliGRAM(s) IV Intermittent every 24 hours    MEDICATIONS  (PRN):  acetaminophen     Tablet .. 650 milliGRAM(s) Oral every 6 hours PRN Mild Pain (1 - 3), Moderate Pain (4 - 6)  melatonin 5 milliGRAM(s) Oral at bedtime PRN Insomnia    Allergies    No Known Allergies    Intolerances    shellfish (Nausea)    Vital Signs Last 24 Hrs  T(C): 36.2 (27 Dec 2021 04:50), Max: 36.8 (26 Dec 2021 21:33)  T(F): 97.2 (27 Dec 2021 04:50), Max: 98.3 (26 Dec 2021 21:33)  HR: 80 (27 Dec 2021 05:34) (72 - 84)  BP: 123/70 (27 Dec 2021 04:50) (118/77 - 123/71)  BP(mean): --  RR: 20 (27 Dec 2021 04:50) (16 - 20)  SpO2: 98% (27 Dec 2021 05:34) (94% - 98%)  I&O's Summary        TELE: Not on telemetry   PHYSICAL EXAM:  Appearance: NAD, no distress, alert, obese   HEENT: Moist Mucous Membranes, Anicteric  Cardiovascular: Regular rate and rhythm, Normal S1 S2, No JVD, No murmurs, No rubs, gallops or clicks  Respiratory: Non-labored, Clear to auscultation, No rales, No rhonchi, No wheezing.   Gastrointestinal:  Soft, Non-tender, + BS  Neurologic: Non-focal  Skin: Warm and dry, No visible rashes or ulcers, No ecchymosis, No cyanosis  Musculoskeletal: No clubbing, No cyanosis, No joint swelling/tenderness  Psychiatry: Mood & affect appropriate  Lymph: No peripheral edema.     LABS: All Labs Reviewed:                        11.4   6.63  )-----------( 342      ( 27 Dec 2021 08:22 )             37.5                         10.9   6.23  )-----------( 311      ( 26 Dec 2021 08:29 )             35.6     27 Dec 2021 08:22    141    |  106    |  24     ----------------------------<  195    4.0     |  29     |  1.30   26 Dec 2021 08:29    141    |  106    |  28     ----------------------------<  225    3.9     |  28     |  1.30   25 Dec 2021 16:45    142    |  105    |  30     ----------------------------<  172    4.2     |  30     |  1.40     Ca    10.0       27 Dec 2021 08:22  Ca    9.4        26 Dec 2021 08:29  Ca    9.8        25 Dec 2021 16:45      PT/INR - ( 27 Dec 2021 08:22 )   PT: 13.0 sec;   INR: 1.12 ratio         PTT - ( 27 Dec 2021 08:22 )  PTT:36.9 sec      12 Lead ECG:   Ventricular Rate 74 BPM  Atrial Rate 74 BPM  P-R Interval 148 ms  QRS Duration 86 ms  Q-T Interval 368 ms  QTC Calculation(Bazett) 408 ms  P Axis 65 degrees  R Axis 71 degrees  T Axis 62 degrees  Diagnosis Line Normal sinus rhythmwith sinus arrhythmia  Confirmed by MICHAEL HANEY ([id number]) on 12/20/2021 12:42:13 PM (12-20-21 @ 11:05)    EXAM:  ECHO TTE WO CON COMP W DOPP         PROCEDURE DATE:  12/20/2021        INTERPRETATION:  INDICATION: Ischemic heart disease  sonographer KL    Blood Pressure 123/70    Height 180.3 cm     Weight 97.5 kg       BSA 2.2   sq m    Dimensions:  LA 3.0       Normal Values: 2.0 - 4.0 cm  Ao 3.5        Normal Values: 2.0 - 3.8 cm  SEPTUM 1.3       Normal Values: 0.6 - 1.2 cm  PWT 1.0       Normal Values: 0.6 - 1.1 cm  LVIDd 4.3         Normal Values: 3.0 - 5.6 cm  LVIDs 1.8         Normal Values: 1.8 - 4.0 cm      OBSERVATIONS:  Technically difficult and limited study  Mitral Valve: normal, trace physiologic MR.  Aortic Valve/Aorta: Sclerotic trileaflet aortic valve with normal opening.  Tricuspid Valve: Not well-visualized  Pulmonic Valve: Not well-visualized  Left Atrium: normal  Right Atrium: Not well-visualized  Left Ventricle: The left ventricular endocardium is not well-visualized.   Overall normal systolic function with an estimated LVEF of 55%. Cannot   evaluate for segmental abnormalities  Right Ventricle: Not well-visualized  Pericardium: no significant pericardial effusion.  Pulmonary/RV Pressure: estimated PA systolic pressure of 28 mmHg  LV diastolic dysfunction is present    IMPRESSION:  Technically difficult and limited study  The left ventricular endocardium is not well-visualized. Overall normal   systolic function with an estimated LVEF of 55-60%. Cannot evaluate for   segmental abnormalities  The right ventricle is not well-visualized  Sclerotic trileaflet aortic valve, without AI.  Trace physiologic MR  No significant pericardial effusion.    --- End of Report ---

## 2021-12-27 NOTE — PROGRESS NOTE ADULT - SUBJECTIVE AND OBJECTIVE BOX
[facility]  INFECTIOUS DISEASES   [address] [place] [address]  Tel: [phone]     Fax: [phone]  ======================================================  MD Noman Perry Kaushal, MD Cho, Michelle, MD   ======================================================    [facility]-[id number]  [place]     Follow up: Left femoral OM and intramedullary abscess     Patient seen and examined, no overnight event.   No fever, no new complaint, no new changes.   For drain placement by IR today.     PAST MEDICAL & SURGICAL HISTORY:  Diabetes Mellitus, Type II  CAD (Coronary Artery Disease)  s/p 3v CABG ; [facility] [facility] [place]   Dyslipidemia  Osteomyelitis  COPD (chronic obstructive pulmonary disease)  on 2L at home and BiPAP at night; intubated   Hypertension  PVD (peripheral vascular disease)  History of PAT (paroxysmal atrial tachycardia)  CABG (Coronary Artery Bypass Graft)    Compound fracture  left leg  S/P primary angioplasty with coronary stent    Social Hx: Former smoker, no ETOH or drugs     FAMILY HISTORY:  Family history of diabetes mellitus (Sibling)  Family hx of lung cancer  brother,  age 82, used to smoke with pt  Allergies  No Known Allergies    Intolerances  shellfish (Nausea)    Antibiotics:  MEDICATIONS  (STANDING):  atorvastatin 80 milliGRAM(s) Oral at bedtime  azithromycin   Tablet 250 milliGRAM(s) Oral <User Schedule>  budesonide 160 MICROgram(s)/formoterol 4.5 MICROgram(s) Inhaler 2 Puff(s) Inhalation two times a day  cefepime   IVPB 2000 milliGRAM(s) IV Intermittent every 12 hours  clopidogrel Tablet 75 milliGRAM(s) Oral daily  dextrose 40% Gel 15 Gram(s) Oral once  dextrose 5%. 1000 milliLiter(s) (50 mL/Hr) IV Continuous <Continuous>  dextrose 5%. 1000 milliLiter(s) (100 mL/Hr) IV Continuous <Continuous>  dextrose 50% Injectable 25 Gram(s) IV Push once  dextrose 50% Injectable 12.5 Gram(s) IV Push once  dextrose 50% Injectable 25 Gram(s) IV Push once  glucagon  Injectable 1 milliGRAM(s) IntraMuscular once  insulin lispro (ADMELOG) corrective regimen sliding scale   SubCutaneous three times a day before meals  insulin lispro (ADMELOG) corrective regimen sliding scale   SubCutaneous at bedtime  metoprolol tartrate 50 milliGRAM(s) Oral two times a day  predniSONE   Tablet 4 milliGRAM(s) Oral daily  sodium chloride 0.9%. 1000 milliLiter(s) (60 mL/Hr) IV Continuous <Continuous>  tamsulosin 0.4 milliGRAM(s) Oral at bedtime  tiotropium 18 MICROgram(s) Capsule 1 Capsule(s) Inhalation daily  vancomycin  IVPB 1500 milliGRAM(s) IV Intermittent every 24 hours     REVIEW OF SYSTEMS:  CONSTITUTIONAL:  No Fever or chills  HEENT:  No diplopia or blurred vision.  No sore throat or runny nose.  CARDIOVASCULAR:  No chest pain or SOB.  RESPIRATORY:  No cough, shortness of breath, PND or orthopnea.  GASTROINTESTINAL:  No nausea, vomiting or diarrhea.  GENITOURINARY:  No dysuria, frequency or urgency. No Blood in urine  MUSCULOSKELETAL: left leg pain   SKIN:  No change in skin, hair or nails.  NEUROLOGIC:  No paresthesias, fasciculations, seizures or weakness.  PSYCHIATRIC:  No disorder of thought or mood.  ENDOCRINE:  No heat or cold intolerance, polyuria or polydipsia.  HEMATOLOGICAL:  No easy bruising or bleeding.     Physical Exam:  Vital Signs Last 24 Hrs  T(C): 36.2 (27 Dec 2021 04:50), Max: 36.8 (26 Dec 2021 21:33)  T(F): 97.2 (27 Dec 2021 04:50), Max: 98.3 (26 Dec 2021 21:33)  HR: 80 (27 Dec 2021 05:34) (72 - 84)  BP: 123/70 (27 Dec 2021 04:50) (118/77 - 123/71)  BP(mean): --  RR: 20 (27 Dec 2021 04:50) (16 - 20)  SpO2: 98% (27 Dec 2021 05:34) (94% - 98%)  GEN: NAD, obese   HEENT: normocephalic and atraumatic. EOMI. PERRL.    NECK: Supple.  No lymphadenopathy   LUNGS: Clear to auscultation.  HEART: Regular rate and rhythm   ABDOMEN: Soft, nontender, and nondistended.  Positive bowel sounds.    : No CVA tenderness  EXTREMITIES: left leg in thigh area has a scar in lateral side with no discharge or open wound.  Swelling in anterior part, no tenderness or fluctuation. No sign of cellulitis on soft tissue.   NEUROLOGIC: grossly intact.  PSYCHIATRIC: Appropriate affect .  SKIN: No rash, as above     Labs:                        11.4   6.63  )-----------( 342      ( 27 Dec 2021 08:22 )             37.5         141  |  106  |  24<H>  ----------------------------<  195<H>  4.0   |  29  |  1.30    Ca    10.0      27 Dec 2021 08:22    Culture - Blood (collected 21 @ 21:22)  Source: .Blood Blood  Final Report (21 @ 22:01):    No Growth Final    Culture - Blood (collected 21 @ 21:22)  Source: .Blood Blood  Final Report (21 @ 22:01):    No Growth Final    WBC Count: 6.63 K/uL (21 @ 08:22)  WBC Count: 6.23 K/uL (21 @ 08:29)  WBC Count: 6.46 K/uL (21 @ 09:26)  WBC Count: 7.41 K/uL (21 @ 07:11)    Creatinine, Serum: 1.30 mg/dL (21 @ 08:22)  Creatinine, Serum: 1.30 mg/dL (21 @ 08:29)  Creatinine, Serum: 1.40 mg/dL (21 @ 16:45)  Creatinine, Serum: 1.30 mg/dL (21 @ 09:26)  Creatinine, Serum: 1.40 mg/dL (21 @ 07:11)    C-Reactive Protein, Serum: 81 mg/L (21 @ 15:37)  C-Reactive Protein, Serum: 70 mg/L (21 @ 17:04)    Sedimentation Rate, Erythrocyte: 25 mm/hr (21 @ 08:22)  Sedimentation Rate, Erythrocyte: 60 mm/hr (21 @ 09:06)  Sedimentation Rate, Erythrocyte: 42 mm/hr (21 @ 10:44)    COVID-19 PCR: NotDetec (21 @ 07:54)  COVID-19 PCR: NotDetec (21 @ 17:43)    All imaging and other data have been reviewed.  < from: CT Femur No Cont, Left (21 @ 22:24) >  IMPRESSION:  Old healed deformity of the mid left femur likely correlating with the   history of a prior osteomyelitis. Complex heterogeneous lobulated soft   tissue anterior and lateral to the middle two thirds of the left femur   concerning for an abscess. New lobulated intramedullary defect with tract   extending to the cortical surface in the mid left femur concerning for a   possible intramedullary abscess. A contrast-enhancedMRI of the left   femur is recommended for further evaluation.    Assessment and Plan:   80yo man with PMH of HTN, COPD on home O2, CAD s/p CABG, PVD s/p stents in b/l legs and left femoral fracture more than 50 years ago, was admitted with left leg pain on the site of old fracture after CT showed possible intramedullary abscess. He has had pain and infection in the old fracture area at least 3-4 times in the past, had multiple surgeries and drainage of area with a long term antibiotic treatment, last one years ago.   Most likely another infection and osteomyelitis with collection in area that needs intervention by ortho. Will cover empirically with 4th Gen cephalosporins and vancomycin until we have culture info.   Doppler neg for DVTs  Admission WBC normal, no fever  ESR=60     CRP=70    1- Left femoral OM and intramedullary abscess   - Blood cultures NGTD   - MRI showed collection, intraosseous abscess   - Ortho consult noted, For IR drain placement today, please send for cultures and pathology   - Continue cefepime 2gm q12 adjusted for renal function   - Continue vancomycin 1500mg daily, trough is therapeutic, will keep 15 to 20.   - Follow creatinine to adjust ABx doses, mild MERRILL/CKD, creat improving   - Based on culture will need a long treatment with IV antibiotics will need a PICC line    2- COPD  - Continue azithromycin 250mg 3times weekly prophylactically   - Pulmonary consult noted  - On o2 and BiPAP at night time      Will follow.    Brandi العلي MD  Division of Infectious Diseases   Cell [phone] between 8am and 6pm   After 6pm and weekends please call ID service at [phone].     >25 minutes spent on total encounter assessing patient, examination, chart reivew, counseling and coordinating care by the attending physician/nurse/care manager.

## 2021-12-27 NOTE — PROGRESS NOTE ADULT - PROBLEM SELECTOR PLAN 8
- pt with remote hx of PAT vs PAF; diagnosed in [facility] within the last year  - c/w home metoprolol  - on home Eliquis 5mg BID; last dose AM of 12/17. Hold for IR intervention of intramedullary abscess   - EKG: SR with premature supraventricular complexes

## 2021-12-27 NOTE — PROGRESS NOTE ADULT - PROBLEM SELECTOR PLAN 4
- chronic, stable   - on home 2L NC PRN; continue as needed.  Pt has been sat >90% at RA.   - on chronic po prednisone, decreased to 2 mg po daily 12/25  - BIPAP at night; will continue with home settings  - c/w home azithro M, W, F  - c/w home montelukast  - Pt on home breo ellipta and Incruse ellipta -- therapeutic interchange with Symbicort and tiotropium

## 2021-12-27 NOTE — PROGRESS NOTE ADULT - SUBJECTIVE AND OBJECTIVE BOX
Patient is a 81y old  Male who presents with a chief complaint of increasing left leg pain (24 Dec 2021 12:38)    Subjective:  INTERVAL HPI/OVERNIGHT EVENTS: Patient seen and examined at bedside. No overnight events occurred. Patient has no complaints at this time, tolerating po, denies leg pain. Denies fevers, chills, headache, lightheadedness, chest pain, dyspnea, abdominal pain.     MEDICATIONS  (STANDING):  aspirin enteric coated 81 milliGRAM(s) Oral daily  atorvastatin 80 milliGRAM(s) Oral at bedtime  azithromycin   Tablet 250 milliGRAM(s) Oral <User Schedule>  budesonide 160 MICROgram(s)/formoterol 4.5 MICROgram(s) Inhaler 2 Puff(s) Inhalation two times a day  cefepime   IVPB 2000 milliGRAM(s) IV Intermittent every 12 hours  dextrose 40% Gel 15 Gram(s) Oral once  dextrose 5%. 1000 milliLiter(s) (50 mL/Hr) IV Continuous <Continuous>  dextrose 5%. 1000 milliLiter(s) (100 mL/Hr) IV Continuous <Continuous>  dextrose 50% Injectable 25 Gram(s) IV Push once  dextrose 50% Injectable 12.5 Gram(s) IV Push once  dextrose 50% Injectable 25 Gram(s) IV Push once  glucagon  Injectable 1 milliGRAM(s) IntraMuscular once  insulin glargine Injectable (LANTUS) 10 Unit(s) SubCutaneous at bedtime  insulin lispro (ADMELOG) corrective regimen sliding scale   SubCutaneous three times a day before meals  insulin lispro (ADMELOG) corrective regimen sliding scale   SubCutaneous at bedtime  insulin lispro Injectable (ADMELOG) 4 Unit(s) SubCutaneous three times a day before meals  metoprolol tartrate 50 milliGRAM(s) Oral two times a day  predniSONE   Tablet 2 milliGRAM(s) Oral daily  tamsulosin 0.4 milliGRAM(s) Oral at bedtime  tiotropium 18 MICROgram(s) Capsule 1 Capsule(s) Inhalation daily  vancomycin  IVPB 1500 milliGRAM(s) IV Intermittent every 24 hours    MEDICATIONS  (PRN):  acetaminophen     Tablet .. 650 milliGRAM(s) Oral every 6 hours PRN Mild Pain (1 - 3), Moderate Pain (4 - 6)  melatonin 5 milliGRAM(s) Oral at bedtime PRN Insomnia      Allergies  No Known Allergies    Intolerances  shellfish (Nausea)      REVIEW OF SYSTEMS:  CONSTITUTIONAL: No fever or chills  HEENT:  No headache, no sore throat  RESPIRATORY: No cough, wheezing, or shortness of breath  CARDIOVASCULAR: No chest pain, palpitations  GASTROINTESTINAL: No abd pain, nausea, vomiting, or diarrhea, +BM   GENITOURINARY: No dysuria, frequency, or hematuria  NEUROLOGICAL: no focal weakness or dizziness  MUSCULOSKELETAL: no myalgias     Objective:  Vital Signs Last 24 Hrs  T(C): 36.2 (27 Dec 2021 04:50), Max: 36.8 (26 Dec 2021 21:33)  T(F): 97.2 (27 Dec 2021 04:50), Max: 98.3 (26 Dec 2021 21:33)  HR: 80 (27 Dec 2021 05:34) (72 - 84)  BP: 123/70 (27 Dec 2021 04:50) (123/70 - 123/71)  RR: 20 (27 Dec 2021 04:50) (19 - 20)  SpO2: 98% (27 Dec 2021 05:34) (94% - 98%)    GENERAL: NAD, lying in bed comfortably, breathing at RA.   HEAD:  Atraumatic  EYES: EOMI, PERRLA, conjunctiva and sclera clear  ENT: Moist mucous membranes  NECK: Supple, No JVD  CHEST/LUNG: Clear to auscultation bilaterally. No rales, rhonchi, wheezing. Unlabored respirations  HEART: Regular rate and rhythm; No murmurs.  ABDOMEN: Bowel sounds present. Soft, Nontender, Nondistended.  EXTREMITIES: No cyanosis, or edema. No tenderness in LLE. Active and passive movements normal.   NERVOUS SYSTEM:  Alert & Oriented X3, speech clear. No acute deficits   SKIN: No acute lesions    LABS:                        11.4   6.63  )-----------( 342      ( 27 Dec 2021 08:22 )             37.5     CBC Full  -  ( 27 Dec 2021 08:22 )  WBC Count : 6.63 K/uL  Hemoglobin : 11.4 g/dL  Hematocrit : 37.5 %  Platelet Count - Automated : 342 K/uL  Mean Cell Volume : 87.0 fl  Mean Cell Hemoglobin : 26.5 pg  Mean Cell Hemoglobin Concentration : 30.4 gm/dL      27 Dec 2021 08:22    141    |  106    |  24     ----------------------------<  195    4.0     |  29     |  1.30     Ca    10.0       27 Dec 2021 08:22      PT/INR - ( 27 Dec 2021 08:22 )   PT: 13.0 sec;   INR: 1.12 ratio    PTT - ( 27 Dec 2021 08:22 )  PTT:36.9 sec      CAPILLARY BLOOD GLUCOSE  POCT Blood Glucose.: 154 mg/dL (27 Dec 2021 11:49)  POCT Blood Glucose.: 201 mg/dL (27 Dec 2021 07:58)  POCT Blood Glucose.: 211 mg/dL (26 Dec 2021 22:36)  POCT Blood Glucose.: 153 mg/dL (26 Dec 2021 16:34)        Personally reviewed.     Consultant(s) Notes Reviewed:  [x] YES  [ ] NO     Patient is a 81y old  Male who presents with a chief complaint of increasing left leg pain (24 Dec 2021 12:38)  admitted with - pt Intramedullary abscess in left femur  with om  INTERVAL HPI/OVERNIGHT EVENTS:   Patient seen and examined at bedside. No overnight events occurred. Patient has no complaints at this time, tolerating po, denies leg pain. Denies fevers, chills, headache, lightheadedness, chest pain, dyspnea, abdominal pain.         REVIEW OF SYSTEMS:  CONSTITUTIONAL: No fever or chills  HEENT:  No headache, no sore throat  RESPIRATORY: No cough, wheezing, or shortness of breath  CARDIOVASCULAR: No chest pain, palpitations  GASTROINTESTINAL: No abd pain, nausea, vomiting, or diarrhea, +BM   GENITOURINARY: No dysuria, frequency, or hematuria  NEUROLOGICAL: no focal weakness or dizziness  MUSCULOSKELETAL: no myalgias     Objective:  Vital Signs Last 24 Hrs  T(C): 36.2 (27 Dec 2021 04:50), Max: 36.8 (26 Dec 2021 21:33)  T(F): 97.2 (27 Dec 2021 04:50), Max: 98.3 (26 Dec 2021 21:33)  HR: 80 (27 Dec 2021 05:34) (72 - 84)  BP: 123/70 (27 Dec 2021 04:50) (123/70 - 123/71)  RR: 20 (27 Dec 2021 04:50) (19 - 20)  SpO2: 98% (27 Dec 2021 05:34) (94% - 98%)    GENERAL: NAD,    HEAD:  Atraumatic  EYES: EOMI, PERRLA, conjunctiva and sclera clear  ENT: Moist mucous membranes  NECK: Supple, No JVD  CHEST/LUNG:  auscultation bilaterally. No rales, rhonchi, wheezing. Unlabored respirations  HEART: Regular rate and rhythm; No murmurs , no tachy   ABDOMEN: Bowel sounds present. Soft, Nontender, Nondistended.  EXTREMITIES: No cyanosis, or edema. No tenderness in LLE. Active and passive movements   NERVOUS SYSTEM:  Alert & Oriented X3, speech clear. No acute deficits   SKIN: No acute lesions  gu intact    MEDICATIONS  (STANDING):  aspirin enteric coated 81 milliGRAM(s) Oral daily  atorvastatin 80 milliGRAM(s) Oral at bedtime  azithromycin   Tablet 250 milliGRAM(s) Oral <User Schedule>  budesonide 160 MICROgram(s)/formoterol 4.5 MICROgram(s) Inhaler 2 Puff(s) Inhalation two times a day  cefepime   IVPB 2000 milliGRAM(s) IV Intermittent every 12 hours  dextrose 40% Gel 15 Gram(s) Oral once  dextrose 5%. 1000 milliLiter(s) (50 mL/Hr) IV Continuous <Continuous>  dextrose 5%. 1000 milliLiter(s) (100 mL/Hr) IV Continuous <Continuous>  dextrose 50% Injectable 25 Gram(s) IV Push once  dextrose 50% Injectable 12.5 Gram(s) IV Push once  dextrose 50% Injectable 25 Gram(s) IV Push once  glucagon  Injectable 1 milliGRAM(s) IntraMuscular once  insulin glargine Injectable (LANTUS) 10 Unit(s) SubCutaneous at bedtime  insulin lispro (ADMELOG) corrective regimen sliding scale   SubCutaneous three times a day before meals  insulin lispro (ADMELOG) corrective regimen sliding scale   SubCutaneous at bedtime  insulin lispro Injectable (ADMELOG) 4 Unit(s) SubCutaneous three times a day before meals  metoprolol tartrate 50 milliGRAM(s) Oral two times a day  predniSONE   Tablet 2 milliGRAM(s) Oral daily  tamsulosin 0.4 milliGRAM(s) Oral at bedtime  tiotropium 18 MICROgram(s) Capsule 1 Capsule(s) Inhalation daily  vancomycin  IVPB 1500 milliGRAM(s) IV Intermittent every 24 hours    MEDICATIONS  (PRN):  acetaminophen     Tablet .. 650 milliGRAM(s) Oral every 6 hours PRN Mild Pain (1 - 3), Moderate Pain (4 - 6)  melatonin 5 milliGRAM(s) Oral at bedtime PRN Insomnia    LABS:                        11.4   6.63  )-----------( 342      ( 27 Dec 2021 08:22 )             37.5     CBC Full  -  ( 27 Dec 2021 08:22 )  WBC Count : 6.63 K/uL  Hemoglobin : 11.4 g/dL  Hematocrit : 37.5 %  Platelet Count - Automated : 342 K/uL  Mean Cell Volume : 87.0 fl  Mean Cell Hemoglobin : 26.5 pg  Mean Cell Hemoglobin Concentration : 30.4 gm/dL      27 Dec 2021 08:22    141    |  106    |  24     ----------------------------<  195    4.0     |  29     |  1.30     Ca    10.0       27 Dec 2021 08:22      PT/INR - ( 27 Dec 2021 08:22 )   PT: 13.0 sec;   INR: 1.12 ratio    PTT - ( 27 Dec 2021 08:22 )  PTT:36.9 sec      CAPILLARY BLOOD GLUCOSE  POCT Blood Glucose.: 154 mg/dL (27 Dec 2021 11:49)  POCT Blood Glucose.: 201 mg/dL (27 Dec 2021 07:58)  POCT Blood Glucose.: 211 mg/dL (26 Dec 2021 22:36)  POCT Blood Glucose.: 153 mg/dL (26 Dec 2021 16:34)        Personally reviewed.     Consultant(s) Notes Reviewed:  [x] YES  [ ] NO

## 2021-12-28 LAB
ANION GAP SERPL CALC-SCNC: 7 MMOL/L — SIGNIFICANT CHANGE UP (ref 5–17)
BUN SERPL-MCNC: 21 MG/DL — SIGNIFICANT CHANGE UP (ref 7–23)
CALCIUM SERPL-MCNC: 9.6 MG/DL — SIGNIFICANT CHANGE UP (ref 8.5–10.1)
CHLORIDE SERPL-SCNC: 107 MMOL/L — SIGNIFICANT CHANGE UP (ref 96–108)
CO2 SERPL-SCNC: 27 MMOL/L — SIGNIFICANT CHANGE UP (ref 22–31)
CREAT SERPL-MCNC: 1.2 MG/DL — SIGNIFICANT CHANGE UP (ref 0.5–1.3)
CRP SERPL-MCNC: 11 MG/L — HIGH
ERYTHROCYTE [SEDIMENTATION RATE] IN BLOOD: 23 MM/HR — HIGH (ref 0–20)
GLUCOSE SERPL-MCNC: 184 MG/DL — HIGH (ref 70–99)
HCT VFR BLD CALC: 37 % — LOW (ref 39–50)
HGB BLD-MCNC: 11.5 G/DL — LOW (ref 13–17)
MAGNESIUM SERPL-MCNC: 2.2 MG/DL — SIGNIFICANT CHANGE UP (ref 1.6–2.6)
MCHC RBC-ENTMCNC: 26.7 PG — LOW (ref 27–34)
MCHC RBC-ENTMCNC: 31.1 GM/DL — LOW (ref 32–36)
MCV RBC AUTO: 86 FL — SIGNIFICANT CHANGE UP (ref 80–100)
NRBC # BLD: 0 /100 WBCS — SIGNIFICANT CHANGE UP (ref 0–0)
PHOSPHATE SERPL-MCNC: 2.5 MG/DL — SIGNIFICANT CHANGE UP (ref 2.5–4.5)
PLATELET # BLD AUTO: 331 K/UL — SIGNIFICANT CHANGE UP (ref 150–400)
POTASSIUM SERPL-MCNC: 3.8 MMOL/L — SIGNIFICANT CHANGE UP (ref 3.5–5.3)
POTASSIUM SERPL-SCNC: 3.8 MMOL/L — SIGNIFICANT CHANGE UP (ref 3.5–5.3)
RBC # BLD: 4.3 M/UL — SIGNIFICANT CHANGE UP (ref 4.2–5.8)
RBC # FLD: 13.6 % — SIGNIFICANT CHANGE UP (ref 10.3–14.5)
SODIUM SERPL-SCNC: 141 MMOL/L — SIGNIFICANT CHANGE UP (ref 135–145)
VANCOMYCIN TROUGH SERPL-MCNC: 12.1 UG/ML — SIGNIFICANT CHANGE UP (ref 10–20)
WBC # BLD: 5.29 K/UL — SIGNIFICANT CHANGE UP (ref 3.8–10.5)
WBC # FLD AUTO: 5.29 K/UL — SIGNIFICANT CHANGE UP (ref 3.8–10.5)

## 2021-12-28 PROCEDURE — 99233 SBSQ HOSP IP/OBS HIGH 50: CPT | Mod: GC

## 2021-12-28 PROCEDURE — 99232 SBSQ HOSP IP/OBS MODERATE 35: CPT

## 2021-12-28 PROCEDURE — 77012 CT SCAN FOR NEEDLE BIOPSY: CPT | Mod: 26

## 2021-12-28 PROCEDURE — 10160 PNXR ASPIR ABSC HMTMA BULLA: CPT

## 2021-12-28 PROCEDURE — 99233 SBSQ HOSP IP/OBS HIGH 50: CPT

## 2021-12-28 RX ORDER — INSULIN LISPRO 100/ML
4 VIAL (ML) SUBCUTANEOUS
Refills: 0 | Status: DISCONTINUED | OUTPATIENT
Start: 2021-12-29 | End: 2021-12-30

## 2021-12-28 RX ORDER — GLUCAGON INJECTION, SOLUTION 0.5 MG/.1ML
1 INJECTION, SOLUTION SUBCUTANEOUS ONCE
Refills: 0 | Status: DISCONTINUED | OUTPATIENT
Start: 2021-12-28 | End: 2021-12-30

## 2021-12-28 RX ORDER — CLOPIDOGREL BISULFATE 75 MG/1
75 TABLET, FILM COATED ORAL DAILY
Refills: 0 | Status: DISCONTINUED | OUTPATIENT
Start: 2021-12-29 | End: 2021-12-30

## 2021-12-28 RX ORDER — INSULIN LISPRO 100/ML
4 VIAL (ML) SUBCUTANEOUS
Refills: 0 | Status: DISCONTINUED | OUTPATIENT
Start: 2021-12-28 | End: 2021-12-30

## 2021-12-28 RX ORDER — INSULIN LISPRO 100/ML
VIAL (ML) SUBCUTANEOUS EVERY 6 HOURS
Refills: 0 | Status: DISCONTINUED | OUTPATIENT
Start: 2021-12-28 | End: 2021-12-28

## 2021-12-28 RX ORDER — SODIUM CHLORIDE 9 MG/ML
1000 INJECTION, SOLUTION INTRAVENOUS
Refills: 0 | Status: DISCONTINUED | OUTPATIENT
Start: 2021-12-28 | End: 2021-12-30

## 2021-12-28 RX ORDER — INSULIN LISPRO 100/ML
VIAL (ML) SUBCUTANEOUS
Refills: 0 | Status: DISCONTINUED | OUTPATIENT
Start: 2021-12-28 | End: 2021-12-30

## 2021-12-28 RX ORDER — APIXABAN 2.5 MG/1
5 TABLET, FILM COATED ORAL
Refills: 0 | Status: DISCONTINUED | OUTPATIENT
Start: 2021-12-29 | End: 2021-12-30

## 2021-12-28 RX ADMIN — CEFEPIME 100 MILLIGRAM(S): 1 INJECTION, POWDER, FOR SOLUTION INTRAMUSCULAR; INTRAVENOUS at 06:04

## 2021-12-28 RX ADMIN — Medication 2: at 08:31

## 2021-12-28 RX ADMIN — Medication 300 MILLIGRAM(S): at 13:33

## 2021-12-28 RX ADMIN — TIOTROPIUM BROMIDE 1 CAPSULE(S): 18 CAPSULE ORAL; RESPIRATORY (INHALATION) at 08:32

## 2021-12-28 RX ADMIN — INSULIN GLARGINE 10 UNIT(S): 100 INJECTION, SOLUTION SUBCUTANEOUS at 21:16

## 2021-12-28 RX ADMIN — Medication 2 MILLIGRAM(S): at 06:04

## 2021-12-28 RX ADMIN — Medication 10: at 13:31

## 2021-12-28 RX ADMIN — ATORVASTATIN CALCIUM 80 MILLIGRAM(S): 80 TABLET, FILM COATED ORAL at 21:16

## 2021-12-28 RX ADMIN — Medication 5 MILLIGRAM(S): at 21:25

## 2021-12-28 RX ADMIN — Medication 81 MILLIGRAM(S): at 13:33

## 2021-12-28 RX ADMIN — Medication 4 UNIT(S): at 18:06

## 2021-12-28 RX ADMIN — Medication 650 MILLIGRAM(S): at 21:24

## 2021-12-28 RX ADMIN — Medication 50 MILLIGRAM(S): at 18:59

## 2021-12-28 RX ADMIN — BUDESONIDE AND FORMOTEROL FUMARATE DIHYDRATE 2 PUFF(S): 160; 4.5 AEROSOL RESPIRATORY (INHALATION) at 08:32

## 2021-12-28 RX ADMIN — Medication 6: at 18:06

## 2021-12-28 RX ADMIN — TAMSULOSIN HYDROCHLORIDE 0.4 MILLIGRAM(S): 0.4 CAPSULE ORAL at 21:16

## 2021-12-28 RX ADMIN — BUDESONIDE AND FORMOTEROL FUMARATE DIHYDRATE 2 PUFF(S): 160; 4.5 AEROSOL RESPIRATORY (INHALATION) at 21:23

## 2021-12-28 RX ADMIN — CEFEPIME 100 MILLIGRAM(S): 1 INJECTION, POWDER, FOR SOLUTION INTRAMUSCULAR; INTRAVENOUS at 18:58

## 2021-12-28 RX ADMIN — Medication 50 MILLIGRAM(S): at 06:04

## 2021-12-28 NOTE — PROGRESS NOTE ADULT - SUBJECTIVE AND OBJECTIVE BOX
YUE     PLV 1EAS 107 W1    Allergies    No Known Allergies    Intolerances    shellfish (Nausea)      PAST MEDICAL & SURGICAL HISTORY:  Diabetes Mellitus, Type II    CAD (Coronary Artery Disease)  s/p 3v CABG ; [facility]    Dyslipidemia    Osteomyelitis    COPD (chronic obstructive pulmonary disease)  on 2L at home and BiPAP at night; intubated     Hypertension    PVD (peripheral vascular disease)    History of PAT (paroxysmal atrial tachycardia)    CABG (Coronary Artery Bypass Graft)      Compound fracture  left leg    S/P primary angioplasty with coronary stent        FAMILY HISTORY:  Family history of diabetes mellitus (Sibling)    Family hx of lung cancer  brother,  age 82, used to smoke with pt        Home Medications:  azithromycin 250 mg oral tablet: 1 tab(s) orally Monday, Wednesday, and Friday (18 Dec 2021 01:56)  Breo Ellipta 200 mcg-25 mcg/inh inhalation powder: 1 puff(s) inhaled once a day (18 Dec 2021 01:56)  Incruse Ellipta 62.5 mcg/inh inhalation powder: 1 puff(s) inhaled every 24 hours (18 Dec 2021 01:56)  Jardiance 25 mg oral tablet: 1 tab(s) orally once a day (in the morning) (18 Dec 2021 01:56)  losartan 25 mg oral tablet: 1 tab(s) orally once a day (18 Dec 2021 01:56)  metFORMIN 1000 mg oral tablet: 1 tab(s) orally 2 times a day w/food  please resume the dose on 08/10/2021  (18 Dec 2021 01:56)  NovoLOG FlexPen 100 units/mL injectable solution: Inject 5 units at BS over 200, 10 units at BS over 300, and 15 units at BS over 400 (18 Dec 2021 01:56)  Nucala 100 mg subcutaneous injection: 100 milligram(s) subcutaneous every 4 weeks    *Last given 21* (18 Dec 2021 01:56)  predniSONE 2 mg oral delayed release tablet: 2 tab(s) orally once a day (18 Dec 2021 01:56)  rosuvastatin 20 mg oral tablet: 1 tab(s) orally once a day (at bedtime) (18 Dec 2021 01:56)  tamsulosin 0.4 mg oral capsule: 1 cap(s) orally once a day (at bedtime) (18 Dec 2021 01:56)      MEDICATIONS  (STANDING):  aspirin enteric coated 81 milliGRAM(s) Oral daily  atorvastatin 80 milliGRAM(s) Oral at bedtime  azithromycin   Tablet 250 milliGRAM(s) Oral <User Schedule>  budesonide 160 MICROgram(s)/formoterol 4.5 MICROgram(s) Inhaler 2 Puff(s) Inhalation two times a day  cefepime   IVPB 2000 milliGRAM(s) IV Intermittent every 12 hours  dextrose 40% Gel 15 Gram(s) Oral once  dextrose 5%. 1000 milliLiter(s) (50 mL/Hr) IV Continuous <Continuous>  dextrose 5%. 1000 milliLiter(s) (100 mL/Hr) IV Continuous <Continuous>  dextrose 50% Injectable 25 Gram(s) IV Push once  dextrose 50% Injectable 12.5 Gram(s) IV Push once  dextrose 50% Injectable 25 Gram(s) IV Push once  glucagon  Injectable 1 milliGRAM(s) IntraMuscular once  insulin glargine Injectable (LANTUS) 10 Unit(s) SubCutaneous at bedtime  insulin lispro (ADMELOG) corrective regimen sliding scale   SubCutaneous every 6 hours  metoprolol tartrate 50 milliGRAM(s) Oral two times a day  predniSONE   Tablet 2 milliGRAM(s) Oral daily  tamsulosin 0.4 milliGRAM(s) Oral at bedtime  tiotropium 18 MICROgram(s) Capsule 1 Capsule(s) Inhalation daily  vancomycin  IVPB 1500 milliGRAM(s) IV Intermittent every 24 hours    MEDICATIONS  (PRN):  acetaminophen     Tablet .. 650 milliGRAM(s) Oral every 6 hours PRN Mild Pain (1 - 3), Moderate Pain (4 - 6)  melatonin 5 milliGRAM(s) Oral at bedtime PRN Insomnia      Diet, Consistent Carbohydrate w/Evening Snack:   DASH/TLC Sodium & Cholesterol Restricted (21 @ 17:43) [Active]          Vital Signs Last 24 Hrs  T(C): 36.3 (28 Dec 2021 05:51), Max: 36.5 (27 Dec 2021 15:10)  T(F): 97.4 (28 Dec 2021 05:51), Max: 97.7 (27 Dec 2021 15:10)  HR: 81 (28 Dec 2021 05:51) (72 - 84)  BP: 117/76 (28 Dec 2021 05:51) (117/76 - 120/65)  BP(mean): --  RR: 18 (28 Dec 2021 05:51) (18 - 19)  SpO2: 98% (28 Dec 2021 05:51) (98% - 100%)      21 @ 07:01  -  21 @ 07:00  --------------------------------------------------------  IN: 0 mL / OUT: 35 mL / NET: -35 mL              LABS:                        11.4   6.63  )-----------( 342      ( 27 Dec 2021 08:22 )             37.5         141  |  106  |  24<H>  ----------------------------<  195<H>  4.0   |  29  |  1.30    Ca    10.0      27 Dec 2021 08:22      PT/INR - ( 27 Dec 2021 08:22 )   PT: 13.0 sec;   INR: 1.12 ratio         PTT - ( 27 Dec 2021 08:22 )  PTT:36.9 sec          WBC:  WBC Count: 6.63 K/uL ( @ 08:22)  WBC Count: 6.23 K/uL ( @ 08:29)      MICROBIOLOGY:  RECENT CULTURES:              PT/INR - ( 27 Dec 2021 08:22 )   PT: 13.0 sec;   INR: 1.12 ratio         PTT - ( 27 Dec 2021 08:22 )  PTT:36.9 sec    Sodium:  Sodium, Serum: 141 mmol/L ( @ 08:22)  Sodium, Serum: 141 mmol/L ( @ 08:29)  Sodium, Serum: 142 mmol/L ( @ 16:45)      1.30 mg/dL  @ 08:22  1.30 mg/dL  @ 08:29  1.40 mg/dL  @ 16:45      Hemoglobin:  Hemoglobin: 11.4 g/dL ( @ 08:22)  Hemoglobin: 10.9 g/dL ( @ 08:29)      Platelets: Platelet Count - Automated: 342 K/uL (12-27 @ 08:22)  Platelet Count - Automated: 311 K/uL ( @ 08:29)              RADIOLOGY & ADDITIONAL STUDIES:      MICROBIOLOGY:  RECENT CULTURES:

## 2021-12-28 NOTE — PROGRESS NOTE ADULT - PROBLEM SELECTOR PLAN 3
- CKD 3, Baseline Cr 1.2 - 1.5 as per nephro  - Cr on admission 1.60   - Cr downtrend and normal. 1.2 today.  - Hold home losartan  - DASH diet  - Hold all nephrotoxic agents

## 2021-12-28 NOTE — PROGRESS NOTE ADULT - ASSESSMENT
80yo male with pmhx DM2, CAD s/p 3v CABG 2004, stents in 2019, HLD, HTN, PAT(on Eliquis)?, PVD(s/p stents in b/l legs -- right leg in 2020), osteomyelitis, COPD on prn home o2 and nocturnal bipap, type 2 Dm who presented to ED with increasing left leg pain x 1 week.  Now found to have w/ osteomyelitis, concern for intramedullary abscess    TTE 8/3/21 w/ hypokinesis mild DD   EKG, NSR no ischemic changes noted     CAD/HTN/HLD/PAT/pre-post cardiac optimization   - s/p IR drain/placement Left Femur, tolerated well and stable from CV POV, awaiting for path   - Continue Plavix, ASA, Eliquis, statin  - BP controlled and stable.    - continue BB with hold parameters  - No evidence of volume overload or ischemic symptoms    - Monitor and replete Lytes. Keep K > 4 and Mg > 2  - Will continue to follow.    Nikki Chan, [facility]  Nurse Practitioner - Cardiology   Spectra #[phone]/ [phone]

## 2021-12-28 NOTE — PROGRESS NOTE ADULT - SUBJECTIVE AND OBJECTIVE BOX
[facility] -- Saud Aguilar Grossman, [place], Carroll Bass, Carolina Heath: Office # [phone]    Follow Up:  CAD s/p CABG/stents, Cardiac Optimization    Subjective/Observations: Patient seen and examined, awake, alert, resting comfortably in bed, denies chest pain, dyspnea, palpitations or dizziness, orthopnea and PND. Tolerating room air. ABX infusing     REVIEW OF SYSTEMS: All review of systems is negative for eye, ENT, GI, , allergic, dermatologic, musculoskeletal and neurologic except as described above    PAST MEDICAL & SURGICAL HISTORY:  Diabetes Mellitus, Type II    CAD (Coronary Artery Disease)  s/p 3v CABG 2004; stents placed in [place] in 2019    Dyslipidemia    Osteomyelitis    COPD (chronic obstructive pulmonary disease)  on 2L at home and BiPAP at night; intubated 6/18    Hypertension    PVD (peripheral vascular disease)    History of PAT (paroxysmal atrial tachycardia)    CABG (Coronary Artery Bypass Graft)  2004    Compound fracture  left leg    S/P primary angioplasty with coronary stent        MEDICATIONS  (STANDING):  aspirin enteric coated 81 milliGRAM(s) Oral daily  atorvastatin 80 milliGRAM(s) Oral at bedtime  azithromycin   Tablet 250 milliGRAM(s) Oral <User Schedule>  budesonide 160 MICROgram(s)/formoterol 4.5 MICROgram(s) Inhaler 2 Puff(s) Inhalation two times a day  cefepime   IVPB 2000 milliGRAM(s) IV Intermittent every 12 hours  dextrose 40% Gel 15 Gram(s) Oral once  dextrose 5%. 1000 milliLiter(s) (50 mL/Hr) IV Continuous <Continuous>  dextrose 5%. 1000 milliLiter(s) (100 mL/Hr) IV Continuous <Continuous>  dextrose 50% Injectable 25 Gram(s) IV Push once  dextrose 50% Injectable 12.5 Gram(s) IV Push once  dextrose 50% Injectable 25 Gram(s) IV Push once  glucagon  Injectable 1 milliGRAM(s) IntraMuscular once  insulin glargine Injectable (LANTUS) 10 Unit(s) SubCutaneous at bedtime  insulin lispro (ADMELOG) corrective regimen sliding scale   SubCutaneous every 6 hours  metoprolol tartrate 50 milliGRAM(s) Oral two times a day  predniSONE   Tablet 2 milliGRAM(s) Oral daily  tamsulosin 0.4 milliGRAM(s) Oral at bedtime  tiotropium 18 MICROgram(s) Capsule 1 Capsule(s) Inhalation daily  vancomycin  IVPB 1500 milliGRAM(s) IV Intermittent every 24 hours    MEDICATIONS  (PRN):  acetaminophen     Tablet .. 650 milliGRAM(s) Oral every 6 hours PRN Mild Pain (1 - 3), Moderate Pain (4 - 6)  melatonin 5 milliGRAM(s) Oral at bedtime PRN Insomnia    Allergies    No Known Allergies    Intolerances    shellfish (Nausea)    Vital Signs Last 24 Hrs  T(C): 36.3 (28 Dec 2021 05:51), Max: 36.5 (27 Dec 2021 15:10)  T(F): 97.4 (28 Dec 2021 05:51), Max: 97.7 (27 Dec 2021 15:10)  HR: 81 (28 Dec 2021 05:51) (72 - 84)  BP: 117/76 (28 Dec 2021 05:51) (117/76 - 120/65)  BP(mean): --  RR: 18 (28 Dec 2021 05:51) (18 - 19)  SpO2: 98% (28 Dec 2021 05:51) (98% - 100%)  I&O's Summary    27 Dec 2021 07:01  -  28 Dec 2021 07:00  --------------------------------------------------------  IN: 0 mL / OUT: 35 mL / NET: -35 mL          TELE: Not on telemetry   PHYSICAL EXAM:  Appearance: NAD, no distress, alert,  HEENT: Moist Mucous Membranes, Anicteric  Cardiovascular: Regular rate and rhythm, Normal S1 S2, No JVD, No murmurs, No rubs, gallops or clicks  Respiratory: Non-labored, Clear to auscultation, No rales, No rhonchi, No wheezing.   Gastrointestinal:  Soft, Non-tender, + BS  Neurologic: Non-focal  Skin: Warm and dry, No visible rashes or ulcers, No ecchymosis, No cyanosis  Musculoskeletal: No clubbing, No cyanosis, No joint swelling/tenderness  Psychiatry: Mood & affect appropriate  Lymph: No peripheral edema.     LABS: All Labs Reviewed:                        11.5 5.29  )-----------( 331      ( 28 Dec 2021 09:26 )             37.0                         11.4   6.63  )-----------( 342      ( 27 Dec 2021 08:22 )             37.5                         10.9   6.23  )-----------( 311      ( 26 Dec 2021 08:29 )             35.6     28 Dec 2021 09:26    141    |  107    |  21     ----------------------------<  184    3.8     |  27     |  1.20   27 Dec 2021 08:22    141    |  106    |  24     ----------------------------<  195    4.0     |  29     |  1.30   26 Dec 2021 08:29    141    |  106    |  28     ----------------------------<  225    3.9     |  28     |  1.30     Ca    9.6        28 Dec 2021 09:26  Ca    10.0       27 Dec 2021 08:22  Ca    9.4        26 Dec 2021 08:29  Phos  2.5       28 Dec 2021 09:26  Mg     2.2       28 Dec 2021 09:26      PT/INR - ( 27 Dec 2021 08:22 )   PT: 13.0 sec;   INR: 1.12 ratio         PTT - ( 27 Dec 2021 08:22 )  PTT:36.9 sec                12 Lead ECG:   Ventricular Rate 74 BPM    Atrial Rate 74 BPM    P-R Interval 148 ms    QRS Duration 86 ms    Q-T Interval 368 ms    QTC Calculation(Bazett) 408 ms    P Axis 65 degrees    R Axis 71 degrees    T Axis 62 degrees    Diagnosis Line Normal sinus rhythmwith sinus arrhythmia  Confirmed by MICHAEL BASS ([id number]) on 12/20/2021 12:42:13 PM (12-20-21 @ 11:05)    < from: Xray Chest 1 View AP/PA (12.17.21 @ 17:09) >    ACC: [id number] EXAM:  XR CHEST AP OR PA 1V                          PROCEDURE DATE:  12/17/2021          INTERPRETATION:  Evaluate for pneumonia    AP chest. Prior 9/3/2021.    Median sternotomy. Normal heart mediastinum. Hyperinflated lungs. No   consolidation or effusion.    IMPRESSION: Hyperinflated lungs. No active infiltrates    --- End of Report ---            RODRIGO FAIHT MD; Attending Radiologist  This document has been electronically signed. Dec 18 2021  9:39AM    < end of copied text >  < from: TTE Echo Complete w/o Contrast w/ Doppler (12.20.21 @ 08:28) >     EXAM:  ECHO TTE WO CON COMP W DOPP         PROCEDURE DATE:  12/20/2021        INTERPRETATION:  INDICATION: Ischemic heart disease  sonographer KL    Blood Pressure 123/70    Height 180.3 cm     Weight 97.5 kg       BSA 2.2   sq m    Dimensions:  LA 3.0       Normal Values: 2.0 - 4.0 cm  Ao 3.5        Normal Values: 2.0 - 3.8 cm  SEPTUM 1.3       Normal Values: 0.6 - 1.2 cm  PWT 1.0       Normal Values: 0.6 - 1.1 cm  LVIDd 4.3         Normal Values: 3.0 - 5.6 cm  LVIDs 1.8         Normal Values: 1.8 - 4.0 cm      OBSERVATIONS:  Technically difficult and limited study  Mitral Valve: normal, trace physiologic MR.  Aortic Valve/Aorta: Sclerotic trileaflet aortic valve with normal opening.  Tricuspid Valve: Not well-visualized  Pulmonic Valve: Not well-visualized  Left Atrium: normal  Right Atrium: Not well-visualized  Left Ventricle: The left ventricular endocardium is not well-visualized.   Overall normal systolic function with an estimated LVEF of 55%. Cannot   evaluate for segmental abnormalities  Right Ventricle: Not well-visualized  Pericardium: no significant pericardial effusion.  Pulmonary/RV Pressure: estimated PA systolic pressure of 28 mmHg  LV diastolic dysfunction is present        IMPRESSION:  Technically difficult and limited study  The left ventricular endocardium is not well-visualized. Overall normal   systolic function with an estimated LVEF of 55-60%. Cannot evaluate for   segmental abnormalities  The right ventricle is not well-visualized  Sclerotic trileaflet aortic valve, without AI.  Trace physiologic MR  No significant pericardial effusion.    --- End of Report ---              ARISTIDES LEE MD; Attending Cardiologist  This document has been electronically signed. Dec 21 2021  1:38PM    < end of copied text >

## 2021-12-28 NOTE — PROGRESS NOTE ADULT - PROBLEM SELECTOR PLAN 12
- DVT Prophylaxis: SCD's for now.    - Plavix and Eliquis ordered to re-start tomorrow as per IR recs after procedure

## 2021-12-28 NOTE — PROGRESS NOTE ADULT - PROBLEM SELECTOR PLAN 10
- PVD s/p stents in b/l legs -- right leg in 2020  - Held Plavix and continue ASA 81 mg daily in setting of planned procedure  - Plavix and Eliquis ordered to re-start tomorrow as per IR recs.

## 2021-12-28 NOTE — PROGRESS NOTE ADULT - PROBLEM SELECTOR PLAN 8
- pt with remote hx of PAT vs PAF; diagnosed in [facility] within the last year  - c/w home metoprolol  - on home Eliquis 5mg BID.   - Eliquis ordered to re-start tomorrow as per IR recs.

## 2021-12-28 NOTE — PROGRESS NOTE ADULT - PROBLEM SELECTOR PLAN 1
cont lantus 10 units qhs  cont mod dose admelog corrective scale coverage  pre-meal admelog d/belle while npo  to resume pre-meal admelog when po intake is resumed  goal bg 100-180 in hosp setting

## 2021-12-28 NOTE — PROGRESS NOTE ADULT - PROBLEM SELECTOR PLAN 5
- Chronic,  HbA1c 7.6  - Hold home metformin and Jardiance  - Continue MDSS   - hypoglycemia protocol, accuchecks   - Patient on low dose prednisone for COPD  - Lantus increased from 5 to 10u bedtime and Admelog 4U TID before meals per endocrine, for persistent elevated glucose over 200's including fasting  - Continue to monitor  - Endocrino Dr. Coughlin following.

## 2021-12-28 NOTE — PROGRESS NOTE ADULT - ATTENDING COMMENTS
pt seen and examine today  see above plan - pt Intramedullary abscess in left femur  with om-  s/p     ir guided  aubrey drain  -      blood cult neg to date  , fluid cult  pending  , hold on Eliquis  and Plavix    resume tomorrow   am  - Continue vancomycin 1500mg daily, trough is therapeutic  12 .1  today , will keep 15 to 20.   - Based on culture will need a long treatment with IV antibiotics will need a PICC line  as per id dr gomez.   .

## 2021-12-28 NOTE — PROGRESS NOTE ADULT - SUBJECTIVE AND OBJECTIVE BOX
Patient is a 81y old  Male who presents with a chief complaint of increasing left leg pain (24 Dec 2021 12:38)    Subjective:  INTERVAL HPI/OVERNIGHT EVENTS: Patient seen and examined at bedside. No overnight events occurred. Patient has no complaints at this time, tolerating po, breathing good.  Denies fevers, chills, headache, lightheadedness, chest pain, dyspnea, cough, abdominal pain, or leg pain.     MEDICATIONS  (STANDING):  aspirin enteric coated 81 milliGRAM(s) Oral daily  atorvastatin 80 milliGRAM(s) Oral at bedtime  azithromycin   Tablet 250 milliGRAM(s) Oral <User Schedule>  budesonide 160 MICROgram(s)/formoterol 4.5 MICROgram(s) Inhaler 2 Puff(s) Inhalation two times a day  cefepime   IVPB 2000 milliGRAM(s) IV Intermittent every 12 hours  dextrose 40% Gel 15 Gram(s) Oral once  dextrose 5%. 1000 milliLiter(s) (50 mL/Hr) IV Continuous <Continuous>  dextrose 5%. 1000 milliLiter(s) (100 mL/Hr) IV Continuous <Continuous>  dextrose 50% Injectable 25 Gram(s) IV Push once  dextrose 50% Injectable 12.5 Gram(s) IV Push once  dextrose 50% Injectable 25 Gram(s) IV Push once  glucagon  Injectable 1 milliGRAM(s) IntraMuscular once  insulin glargine Injectable (LANTUS) 10 Unit(s) SubCutaneous at bedtime  insulin lispro (ADMELOG) corrective regimen sliding scale   SubCutaneous every 6 hours  metoprolol tartrate 50 milliGRAM(s) Oral two times a day  predniSONE   Tablet 2 milliGRAM(s) Oral daily  tamsulosin 0.4 milliGRAM(s) Oral at bedtime  tiotropium 18 MICROgram(s) Capsule 1 Capsule(s) Inhalation daily  vancomycin  IVPB 1500 milliGRAM(s) IV Intermittent every 24 hours    MEDICATIONS  (PRN):  acetaminophen     Tablet .. 650 milliGRAM(s) Oral every 6 hours PRN Mild Pain (1 - 3), Moderate Pain (4 - 6)  melatonin 5 milliGRAM(s) Oral at bedtime PRN Insomnia      Allergies  No Known Allergies    Intolerances    shellfish (Nausea)    REVIEW OF SYSTEMS:  CONSTITUTIONAL: No fever or chills  HEENT: No headache, no sore throat  RESPIRATORY: No cough, wheezing, or shortness of breath  CARDIOVASCULAR: No chest pain, palpitations  GASTROINTESTINAL: No abd pain, nausea, vomiting, or diarrhea  GENITOURINARY: No dysuria, frequency, or hematuria  NEUROLOGICAL: no focal weakness or dizziness  MUSCULOSKELETAL: no myalgias     Objective:  Vital Signs Last 24 Hrs  T(C): 36.6 (28 Dec 2021 12:36), Max: 36.6 (28 Dec 2021 12:36)  T(F): 97.8 (28 Dec 2021 12:36), Max: 97.8 (28 Dec 2021 12:36)  HR: 86 (28 Dec 2021 12:36) (81 - 86)  BP: 109/61 (28 Dec 2021 12:36) (109/61 - 117/76)  RR: 19 (28 Dec 2021 12:36) (18 - 19)  SpO2: 93% (28 Dec 2021 12:36) (93% - 98%)    GENERAL: NAD, lying in bed comfortably  HEAD:  Atraumatic.  EYES: EOMI, PERRLA  ENT: Moist mucous membranes  NECK: Supple, No JVD  CHEST/LUNG: Clear to auscultation bilaterally. No rales, rhonchi, wheezing, or rubs. Unlabored respirations  HEART: Regular rate and rhythm; No murmurs  ABDOMEN: Bowel sounds present; Soft, Nontender, Nondistended.   EXTREMITIES:  + Peripheral Pulses. No clubbing, cyanosis, or edema, no tenderness. Left thigh with cath and vac for drainage, mild serosanguineous drainage seen.   NERVOUS SYSTEM:  Alert & Oriented X3. No acute deficits   SKIN: No rashes or lesions    LABS:                        11.5   5.29  )-----------( 331      ( 28 Dec 2021 09:26 )             37.0     CBC Full  -  ( 28 Dec 2021 09:26 )  WBC Count : 5.29 K/uL  Hemoglobin : 11.5 g/dL  Hematocrit : 37.0 %  Platelet Count - Automated : 331 K/uL  Mean Cell Volume : 86.0 fl  Mean Cell Hemoglobin : 26.7 pg  Mean Cell Hemoglobin Concentration : 31.1 gm/dL      28 Dec 2021 09:26    141    |  107    |  21     ----------------------------<  184    3.8     |  27     |  1.20     Ca    9.6        28 Dec 2021 09:26  Phos  2.5       28 Dec 2021 09:26  Mg     2.2       28 Dec 2021 09:26      PT/INR - ( 27 Dec 2021 08:22 )   PT: 13.0 sec;   INR: 1.12 ratio      PTT - ( 27 Dec 2021 08:22 )  PTT:36.9 sec      CAPILLARY BLOOD GLUCOSE    POCT Blood Glucose.: 355 mg/dL (28 Dec 2021 13:30)  POCT Blood Glucose.: 292 mg/dL (28 Dec 2021 11:46)  POCT Blood Glucose.: 182 mg/dL (28 Dec 2021 08:03)  POCT Blood Glucose.: 298 mg/dL (27 Dec 2021 22:15)  POCT Blood Glucose.: 178 mg/dL (27 Dec 2021 17:50)  POCT Blood Glucose.: 203 mg/dL (27 Dec 2021 16:47)        Culture - Abscess with Gram Stain (collected 12-27-21 @ 17:37)  Source: .Abscess Left femur/perifemur  Preliminary Report (12-28-21 @ 12:58):    No growth        Personally reviewed.     Consultant(s) Notes Reviewed:  [x] YES  [ ] NO     Patient is a 81y old  Male who presents with a chief complaint of increasing left leg pain (24 Dec 2021 12:38)    abscess in left femur now with concern for  abscess with chr om lt leg   s/p ir guided aubrey drain     -  INTERVAL HPI/OVERNIGHT EVENTS: Patient seen and examined at bedside. No overnight events occurred.   Patient has no complaints at this time, tolerating po, pt Denies fevers, chills, headache, lightheadedness, chest pain, dyspnea, cough,.   no lt  leg pain  post procedure+  .             REVIEW OF SYSTEMS:  CONSTITUTIONAL: No fever or chills  HEENT: No headache, no sore throat  RESPIRATORY: No cough, wheezing, or shortness of breath  CARDIOVASCULAR: No chest pain, palpitations  GASTROINTESTINAL: No abd pain, nausea, vomiting, or diarrhea  GENITOURINARY: No dysuria, frequency, or hematuria  NEUROLOGICAL: no focal weakness or dizziness  MUSCULOSKELETAL: no myalgias     Objective:  Vital Signs Last 24 Hrs  T(C): 36.6 (28 Dec 2021 12:36), Max: 36.6 (28 Dec 2021 12:36)  T(F): 97.8 (28 Dec 2021 12:36), Max: 97.8 (28 Dec 2021 12:36)  HR: 86 (28 Dec 2021 12:36) (81 - 86)  BP: 109/61 (28 Dec 2021 12:36) (109/61 - 117/76)  RR: 19 (28 Dec 2021 12:36) (18 - 19)  SpO2: 93% (28 Dec 2021 12:36) (93% - 98%)    GENERAL: NAD, lying in bed comfortably  HEAD:  Atraumatic.  EYES: EOMI, PERRLA  ENT: Moist mucous membranes  NECK: Supple, No JVD  CHEST/LUNG:  auscultation bilaterally. No rales, rhonchi, wheezing, or rubs.   HEART: Regular rate and rhythm; No murmurs  ABDOMEN: Bowel sounds present; Soft, Nontender, Nondistended.   EXTREMITIES:  + Peripheral Pulses. No clubbing, cyanosis, or edema, no tenderness. Left thigh with cath and vac for drainage, mild serosanguineous drainage seen.   NERVOUS SYSTEM:  Alert & Oriented X3. No acute deficits   SKIN: No rashes or lesions    MEDICATIONS  (STANDING):  aspirin enteric coated 81 milliGRAM(s) Oral daily  atorvastatin 80 milliGRAM(s) Oral at bedtime  azithromycin   Tablet 250 milliGRAM(s) Oral <User Schedule>  budesonide 160 MICROgram(s)/formoterol 4.5 MICROgram(s) Inhaler 2 Puff(s) Inhalation two times a day  cefepime   IVPB 2000 milliGRAM(s) IV Intermittent every 12 hours  dextrose 40% Gel 15 Gram(s) Oral once  dextrose 5%. 1000 milliLiter(s) (50 mL/Hr) IV Continuous <Continuous>  dextrose 5%. 1000 milliLiter(s) (100 mL/Hr) IV Continuous <Continuous>  dextrose 50% Injectable 25 Gram(s) IV Push once  dextrose 50% Injectable 12.5 Gram(s) IV Push once  dextrose 50% Injectable 25 Gram(s) IV Push once  glucagon  Injectable 1 milliGRAM(s) IntraMuscular once  insulin glargine Injectable (LANTUS) 10 Unit(s) SubCutaneous at bedtime  insulin lispro (ADMELOG) corrective regimen sliding scale   SubCutaneous every 6 hours  metoprolol tartrate 50 milliGRAM(s) Oral two times a day  predniSONE   Tablet 2 milliGRAM(s) Oral daily  tamsulosin 0.4 milliGRAM(s) Oral at bedtime  tiotropium 18 MICROgram(s) Capsule 1 Capsule(s) Inhalation daily  vancomycin  IVPB 1500 milliGRAM(s) IV Intermittent every 24 hours    MEDICATIONS  (PRN):  acetaminophen     Tablet .. 650 milliGRAM(s) Oral every 6 hours PRN Mild Pain (1 - 3), Moderate Pain (4 - 6)  melatonin 5 milliGRAM(s) Oral at bedtime PRN Insomnia  LABS:                        11.5   5.29  )-----------( 331      ( 28 Dec 2021 09:26 )             37.0     CBC Full  -  ( 28 Dec 2021 09:26 )  WBC Count : 5.29 K/uL  Hemoglobin : 11.5 g/dL  Hematocrit : 37.0 %  Platelet Count - Automated : 331 K/uL  Mean Cell Volume : 86.0 fl  Mean Cell Hemoglobin : 26.7 pg  Mean Cell Hemoglobin Concentration : 31.1 gm/dL      28 Dec 2021 09:26    141    |  107    |  21     ----------------------------<  184    3.8     |  27     |  1.20     Ca    9.6        28 Dec 2021 09:26  Phos  2.5       28 Dec 2021 09:26  Mg     2.2       28 Dec 2021 09:26      PT/INR - ( 27 Dec 2021 08:22 )   PT: 13.0 sec;   INR: 1.12 ratio      PTT - ( 27 Dec 2021 08:22 )  PTT:36.9 sec      CAPILLARY BLOOD GLUCOSE    POCT Blood Glucose.: 355 mg/dL (28 Dec 2021 13:30)  POCT Blood Glucose.: 292 mg/dL (28 Dec 2021 11:46)  POCT Blood Glucose.: 182 mg/dL (28 Dec 2021 08:03)  POCT Blood Glucose.: 298 mg/dL (27 Dec 2021 22:15)  POCT Blood Glucose.: 178 mg/dL (27 Dec 2021 17:50)  POCT Blood Glucose.: 203 mg/dL (27 Dec 2021 16:47)        Culture - Abscess with Gram Stain (collected 12-27-21 @ 17:37)  Source: .Abscess Left femur/perifemur  Preliminary Report (12-28-21 @ 12:58):    No growth        Personally reviewed.     Consultant(s) Notes Reviewed:  [x] YES  [ ] NO

## 2021-12-28 NOTE — PROGRESS NOTE ADULT - SUBJECTIVE AND OBJECTIVE BOX
CAPILLARY BLOOD GLUCOSE      POCT Blood Glucose.: 298 mg/dL (27 Dec 2021 22:15)  POCT Blood Glucose.: 178 mg/dL (27 Dec 2021 17:50)  POCT Blood Glucose.: 203 mg/dL (27 Dec 2021 16:47)  POCT Blood Glucose.: 154 mg/dL (27 Dec 2021 11:49)  POCT Blood Glucose.: 201 mg/dL (27 Dec 2021 07:58)      Vital Signs Last 24 Hrs  T(C): 36.3 (28 Dec 2021 05:51), Max: 36.5 (27 Dec 2021 15:10)  T(F): 97.4 (28 Dec 2021 05:51), Max: 97.7 (27 Dec 2021 15:10)  HR: 81 (28 Dec 2021 05:51) (72 - 84)  BP: 117/76 (28 Dec 2021 05:51) (117/76 - 120/65)  BP(mean): --  RR: 18 (28 Dec 2021 05:51) (18 - 19)  SpO2: 98% (28 Dec 2021 05:51) (98% - 100%)    Respiratory: CTA B/L  CV: RRR, S1S2, no murmurs, rubs or gallops  Abdominal: Soft, NT, ND +BS, Last BM  Extremities: No edema, + peripheral pulses     12-27    141  |  106  |  24<H>  ----------------------------<  195<H>  4.0   |  29  |  1.30    Ca    10.0      27 Dec 2021 08:22        atorvastatin 80 milliGRAM(s) Oral at bedtime  dextrose 40% Gel 15 Gram(s) Oral once  dextrose 50% Injectable 25 Gram(s) IV Push once  dextrose 50% Injectable 25 Gram(s) IV Push once  dextrose 50% Injectable 12.5 Gram(s) IV Push once  glucagon  Injectable 1 milliGRAM(s) IntraMuscular once  insulin glargine Injectable (LANTUS) 10 Unit(s) SubCutaneous at bedtime  insulin lispro (ADMELOG) corrective regimen sliding scale   SubCutaneous three times a day before meals  insulin lispro (ADMELOG) corrective regimen sliding scale   SubCutaneous at bedtime  predniSONE   Tablet 2 milliGRAM(s) Oral daily

## 2021-12-28 NOTE — PHYSICAL THERAPY INITIAL EVALUATION ADULT - PERTINENT HX OF CURRENT PROBLEM, REHAB EVAL
The patient is an 80yo male with pmhx CAD s/p 3v CABG 2004, stents in 2019, HLD, HTN, PAT(on Eliquis), PVD(s/p stents in b/l legs -- right leg in 2020), osteomyelitis, COPD on prn home o2 and nocturnal bipap, type 2 Dm  presents to ED with leg pain x 1 week , found to have CT findings c/w abscess in left femur now with concern for abscess with OM.

## 2021-12-28 NOTE — PROGRESS NOTE ADULT - SUBJECTIVE AND OBJECTIVE BOX
[facility]  INFECTIOUS DISEASES   [address] [place] [address]  Tel: [phone]     Fax: [phone]  ======================================================  MD Noman Perry Kaushal, MD Cho, Michelle, MD   ======================================================    [id number]  [place]     Follow up: Left femoral OM and intramedullary abscess     Patient seen and examined, no overnight event.   No fever, no new complaint, no new changes.   S/P IR drainage of 60ML purulent fluid, now has drain in place.     PAST MEDICAL & SURGICAL HISTORY:  Diabetes Mellitus, Type II  CAD (Coronary Artery Disease)  s/p 3v CABG ; stents placed in [place]   Dyslipidemia  Osteomyelitis  COPD (chronic obstructive pulmonary disease)  on 2L at home and BiPAP at night; intubated   Hypertension  PVD (peripheral vascular disease)  History of PAT (paroxysmal atrial tachycardia)  CABG (Coronary Artery Bypass Graft)    Compound fracture  left leg  S/P primary angioplasty with coronary stent    Social Hx: Former smoker, no ETOH or drugs     FAMILY HISTORY:  Family history of diabetes mellitus (Sibling)  Family hx of lung cancer  brother,  age 82, used to smoke with pt  Allergies  No Known Allergies    Intolerances  shellfish (Nausea)    Antibiotics:  MEDICATIONS  (STANDING):  atorvastatin 80 milliGRAM(s) Oral at bedtime  azithromycin   Tablet 250 milliGRAM(s) Oral <User Schedule>  budesonide 160 MICROgram(s)/formoterol 4.5 MICROgram(s) Inhaler 2 Puff(s) Inhalation two times a day  cefepime   IVPB 2000 milliGRAM(s) IV Intermittent every 12 hours  clopidogrel Tablet 75 milliGRAM(s) Oral daily  dextrose 40% Gel 15 Gram(s) Oral once  dextrose 5%. 1000 milliLiter(s) (50 mL/Hr) IV Continuous <Continuous>  dextrose 5%. 1000 milliLiter(s) (100 mL/Hr) IV Continuous <Continuous>  dextrose 50% Injectable 25 Gram(s) IV Push once  dextrose 50% Injectable 12.5 Gram(s) IV Push once  dextrose 50% Injectable 25 Gram(s) IV Push once  glucagon  Injectable 1 milliGRAM(s) IntraMuscular once  insulin lispro (ADMELOG) corrective regimen sliding scale   SubCutaneous three times a day before meals  insulin lispro (ADMELOG) corrective regimen sliding scale   SubCutaneous at bedtime  metoprolol tartrate 50 milliGRAM(s) Oral two times a day  predniSONE   Tablet 4 milliGRAM(s) Oral daily  sodium chloride 0.9%. 1000 milliLiter(s) (60 mL/Hr) IV Continuous <Continuous>  tamsulosin 0.4 milliGRAM(s) Oral at bedtime  tiotropium 18 MICROgram(s) Capsule 1 Capsule(s) Inhalation daily  vancomycin  IVPB 1500 milliGRAM(s) IV Intermittent every 24 hours     REVIEW OF SYSTEMS:  CONSTITUTIONAL:  No Fever or chills  HEENT:  No diplopia or blurred vision.  No sore throat or runny nose.  CARDIOVASCULAR:  No chest pain or SOB.  RESPIRATORY:  No cough, shortness of breath, PND or orthopnea.  GASTROINTESTINAL:  No nausea, vomiting or diarrhea.  GENITOURINARY:  No dysuria, frequency or urgency. No Blood in urine  MUSCULOSKELETAL: left leg pain   SKIN:  No change in skin, hair or nails.  NEUROLOGIC:  No paresthesias, fasciculations, seizures or weakness.  PSYCHIATRIC:  No disorder of thought or mood.  ENDOCRINE:  No heat or cold intolerance, polyuria or polydipsia.  HEMATOLOGICAL:  No easy bruising or bleeding.     Physical Exam:  Vital Signs Last 24 Hrs  T(C): 36.6 (28 Dec 2021 12:36), Max: 36.6 (28 Dec 2021 12:36)  T(F): 97.8 (28 Dec 2021 12:36), Max: 97.8 (28 Dec 2021 12:36)  HR: 86 (28 Dec 2021 12:36) (72 - 86)  BP: 109/61 (28 Dec 2021 12:36) (109/61 - 119/70)  RR: 19 (28 Dec 2021 12:36) (18 - 19)  SpO2: 93% (28 Dec 2021 12:36) (93% - 100%)  GEN: NAD, obese   HEENT: normocephalic and atraumatic. EOMI. PERRL.    NECK: Supple.  No lymphadenopathy   LUNGS: Clear to auscultation.  HEART: Regular rate and rhythm   ABDOMEN: Soft, nontender, and nondistended.  Positive bowel sounds.    : No CVA tenderness  EXTREMITIES: left leg in thigh area has a scar in lateral side with no discharge or open wound.  Swelling in anterior part, no tenderness or fluctuation. No sign of cellulitis on soft tissue.   NEUROLOGIC: grossly intact.  PSYCHIATRIC: Appropriate affect .  SKIN: No rash, as above     Labs:                        11.5   5.29  )-----------( 331      ( 28 Dec 2021 09:26 )             37.0         141  |  107  |  21  ----------------------------<  184<H>  3.8   |  27  |  1.20    Ca    9.6      28 Dec 2021 09:26  Phos  2.5       Mg     2.2         Culture - Abscess with Gram Stain (collected 21 @ 17:37)  Source: .Abscess Left femur/perifemur    Culture - Blood (collected 21 @ 21:22)  Source: .Blood Blood  Final Report (21 @ 22:01):    No Growth Final    Culture - Blood (collected 21 @ 21:22)  Source: .Blood Blood  Final Report (21 @ 22:01):    No Growth Final    WBC Count: 5.29 K/uL (21 @ 09:26)  WBC Count: 6.63 K/uL (21 @ 08:22)  WBC Count: 6.23 K/uL (21 @ 08:29)  WBC Count: 6.46 K/uL (21 @ 09:26)    Creatinine, Serum: 1.20 mg/dL (21 @ 09:26)  Creatinine, Serum: 1.30 mg/dL (21 @ 08:22)  Creatinine, Serum: 1.30 mg/dL (21 @ 08:29)  Creatinine, Serum: 1.40 mg/dL (21 @ 16:45)  Creatinine, Serum: 1.30 mg/dL (21 @ 09:26)    C-Reactive Protein, Serum: 9 mg/L (21 @ 12:13)  C-Reactive Protein, Serum: 81 mg/L (21 @ 15:37)  C-Reactive Protein, Serum: 70 mg/L (21 @ 17:04)    Sedimentation Rate, Erythrocyte: 23 mm/hr (21 @ 10:28)  Sedimentation Rate, Erythrocyte: 25 mm/hr (21 @ 08:22)  Sedimentation Rate, Erythrocyte: 60 mm/hr (21 @ 09:06)  Sedimentation Rate, Erythrocyte: 42 mm/hr (21 @ 10:44)    COVID- PCR: NotDetec (21 @ 07:54)  COVID- PCR: NotDetec (21 @ 17:43)    All imaging and other data have been reviewed.  < from: CT Femur No Cont, Left (21 @ 22:24) >  IMPRESSION:  Old healed deformity of the mid left femur likely correlating with the   history of a prior osteomyelitis. Complex heterogeneous lobulated soft   tissue anterior and lateral to the middle two thirds of the left femur   concerning for an abscess. New lobulated intramedullary defect with tract   extending to the cortical surface in the mid left femur concerning for a   possible intramedullary abscess. A contrast-enhancedMRI of the left   femur is recommended for further evaluation.    Assessment and Plan:   82yo man with PMH of HTN, COPD on home O2, CAD s/p CABG, PVD s/p stents in b/l legs and left femoral fracture more than 50 years ago, was admitted with left leg pain on the site of old fracture after CT showed possible intramedullary abscess. He has had pain and infection in the old fracture area at least 3-4 times in the past, had multiple surgeries and drainage of area with a long term antibiotic treatment, last one years ago.   Most likely another infection and osteomyelitis with collection in area that needs intervention by ortho. Will cover empirically with 4th Gen cephalosporins and vancomycin until we have culture info.   Doppler neg for DVTs  Admission WBC normal, no fever  ESR=60     CRP=70    1- Left femoral OM and intramedullary abscess   - Blood cultures NGTD   - MRI showed collection, intraosseous abscess   - Will follow cultures   - S/P IR drain placement on   - Continue cefepime 2gm q12 adjusted for renal function   - Continue vancomycin 1500mg daily, trough is therapeutic, will keep 15 to 20.   - Follow creatinine to adjust ABx doses, mild MERRILL/CKD  - Based on culture will need a long treatment with IV antibiotics will need a PICC line    2- COPD  - Continue azithromycin 250mg 3times weekly prophylactically   - Pulmonary consult noted  - On o2 and BiPAP at night time      Will follow.    Brandi العلي MD  Division of Infectious Diseases   Cell [phone] between 8am and 6pm   After 6pm and weekends please call ID service at [phone].     >25 minutes spent on total encounter assessing patient, examination, chart reivew, counseling and coordinating care by the attending physician/nurse/care manager.   [facility]  INFECTIOUS DISEASES   [address] [place] [address]  Tel: [phone]     Fax: [phone]  ======================================================  MD Noman Perry Kaushal, MD Cho, Michelle, MD   ======================================================    [facility]-[id number]  [place]     Follow up: Left femoral OM and intramedullary abscess     Patient seen and examined, no overnight event.   No fever, no new complaint, no new changes.   S/P IR drainage of 60ML purulent fluid, now has drain in place.     PAST MEDICAL & SURGICAL HISTORY:  Diabetes Mellitus, Type II  CAD (Coronary Artery Disease)  s/p 3v CABG ; stents placed in [place]   Dyslipidemia  Osteomyelitis  COPD (chronic obstructive pulmonary disease)  on 2L at home and BiPAP at night; intubated   Hypertension  PVD (peripheral vascular disease)  History of PAT (paroxysmal atrial tachycardia)  CABG (Coronary Artery Bypass Graft)    Compound fracture  left leg  S/P primary angioplasty with coronary stent    Social Hx: Former smoker, no ETOH or drugs     FAMILY HISTORY:  Family history of diabetes mellitus (Sibling)  Family hx of lung cancer  brother,  age 82, used to smoke with pt  Allergies  No Known Allergies    Intolerances  shellfish (Nausea)    Antibiotics:  MEDICATIONS  (STANDING):  atorvastatin 80 milliGRAM(s) Oral at bedtime  azithromycin   Tablet 250 milliGRAM(s) Oral <User Schedule>  budesonide 160 MICROgram(s)/formoterol 4.5 MICROgram(s) Inhaler 2 Puff(s) Inhalation two times a day  cefepime   IVPB 2000 milliGRAM(s) IV Intermittent every 12 hours  clopidogrel Tablet 75 milliGRAM(s) Oral daily  dextrose 40% Gel 15 Gram(s) Oral once  dextrose 5%. 1000 milliLiter(s) (50 mL/Hr) IV Continuous <Continuous>  dextrose 5%. 1000 milliLiter(s) (100 mL/Hr) IV Continuous <Continuous>  dextrose 50% Injectable 25 Gram(s) IV Push once  dextrose 50% Injectable 12.5 Gram(s) IV Push once  dextrose 50% Injectable 25 Gram(s) IV Push once  glucagon  Injectable 1 milliGRAM(s) IntraMuscular once  insulin lispro (ADMELOG) corrective regimen sliding scale   SubCutaneous three times a day before meals  insulin lispro (ADMELOG) corrective regimen sliding scale   SubCutaneous at bedtime  metoprolol tartrate 50 milliGRAM(s) Oral two times a day  predniSONE   Tablet 4 milliGRAM(s) Oral daily  sodium chloride 0.9%. 1000 milliLiter(s) (60 mL/Hr) IV Continuous <Continuous>  tamsulosin 0.4 milliGRAM(s) Oral at bedtime  tiotropium 18 MICROgram(s) Capsule 1 Capsule(s) Inhalation daily  vancomycin  IVPB 1500 milliGRAM(s) IV Intermittent every 24 hours     REVIEW OF SYSTEMS:  CONSTITUTIONAL:  No Fever or chills  HEENT:  No diplopia or blurred vision.  No sore throat or runny nose.  CARDIOVASCULAR:  No chest pain or SOB.  RESPIRATORY:  No cough, shortness of breath, PND or orthopnea.  GASTROINTESTINAL:  No nausea, vomiting or diarrhea.  GENITOURINARY:  No dysuria, frequency or urgency. No Blood in urine  MUSCULOSKELETAL: left leg pain   SKIN:  No change in skin, hair or nails.  NEUROLOGIC:  No paresthesias, fasciculations, seizures or weakness.  PSYCHIATRIC:  No disorder of thought or mood.  ENDOCRINE:  No heat or cold intolerance, polyuria or polydipsia.  HEMATOLOGICAL:  No easy bruising or bleeding.     Physical Exam:  Vital Signs Last 24 Hrs  T(C): 36.6 (28 Dec 2021 12:36), Max: 36.6 (28 Dec 2021 12:36)  T(F): 97.8 (28 Dec 2021 12:36), Max: 97.8 (28 Dec 2021 12:36)  HR: 86 (28 Dec 2021 12:36) (72 - 86)  BP: 109/61 (28 Dec 2021 12:36) (109/61 - 119/70)  RR: 19 (28 Dec 2021 12:36) (18 - 19)  SpO2: 93% (28 Dec 2021 12:36) (93% - 100%)  GEN: NAD, obese   HEENT: normocephalic and atraumatic. EOMI. PERRL.    NECK: Supple.  No lymphadenopathy   LUNGS: Clear to auscultation.  HEART: Regular rate and rhythm   ABDOMEN: Soft, nontender, and nondistended.  Positive bowel sounds.    : No CVA tenderness  EXTREMITIES: left leg in thigh area has a scar in lateral side with no discharge or open wound.  Swelling in anterior part, no tenderness or fluctuation. No sign of cellulitis on soft tissue.   NEUROLOGIC: grossly intact.  PSYCHIATRIC: Appropriate affect .  SKIN: No rash, as above     Labs:                        11.5   5.29  )-----------( 331      ( 28 Dec 2021 09:26 )             37.0         141  |  107  |  21  ----------------------------<  184<H>  3.8   |  27  |  1.20    Ca    9.6      28 Dec 2021 09:26  Phos  2.5       Mg     2.2         Culture - Abscess with Gram Stain (collected 21 @ 17:37)  Source: .Abscess Left femur/perifemur    Culture - Blood (collected 21 @ 21:22)  Source: .Blood Blood  Final Report (21 @ 22:01):    No Growth Final    Culture - Blood (collected 21 @ 21:22)  Source: .Blood Blood  Final Report (21 @ 22:01):    No Growth Final    WBC Count: 5.29 K/uL (21 @ 09:26)  WBC Count: 6.63 K/uL (21 @ 08:22)  WBC Count: 6.23 K/uL (21 @ 08:29)  WBC Count: 6.46 K/uL (21 @ 09:26)    Creatinine, Serum: 1.20 mg/dL (21 @ 09:26)  Creatinine, Serum: 1.30 mg/dL (21 @ 08:22)  Creatinine, Serum: 1.30 mg/dL (21 @ 08:29)  Creatinine, Serum: 1.40 mg/dL (21 @ 16:45)  Creatinine, Serum: 1.30 mg/dL (21 @ 09:26)    C-Reactive Protein, Serum: 9 mg/L (21 @ 12:13)  C-Reactive Protein, Serum: 81 mg/L (21 @ 15:37)  C-Reactive Protein, Serum: 70 mg/L (21 @ 17:04)    Sedimentation Rate, Erythrocyte: 23 mm/hr (21 @ 10:28)  Sedimentation Rate, Erythrocyte: 25 mm/hr (21 @ 08:22)  Sedimentation Rate, Erythrocyte: 60 mm/hr (21 @ 09:06)  Sedimentation Rate, Erythrocyte: 42 mm/hr (21 @ 10:44)    COVID- PCR: NotDetec (21 @ 07:54)  COVID- PCR: NotDetec (21 @ 17:43)    All imaging and other data have been reviewed.  < from: CT Femur No Cont, Left (21 @ 22:24) >  IMPRESSION:  Old healed deformity of the mid left femur likely correlating with the   history of a prior osteomyelitis. Complex heterogeneous lobulated soft   tissue anterior and lateral to the middle two thirds of the left femur   concerning for an abscess. New lobulated intramedullary defect with tract   extending to the cortical surface in the mid left femur concerning for a   possible intramedullary abscess. A contrast-enhancedMRI of the left   femur is recommended for further evaluation.    Assessment and Plan:   82yo man with PMH of HTN, COPD on home O2, CAD s/p CABG, PVD s/p stents in b/l legs and left femoral fracture more than 50 years ago, was admitted with left leg pain on the site of old fracture after CT showed possible intramedullary abscess. He has had pain and infection in the old fracture area at least 3-4 times in the past, had multiple surgeries and drainage of area with a long term antibiotic treatment, last one years ago.   Most likely another infection and osteomyelitis with collection in area that needs intervention by ortho. Will cover empirically with 4th Gen cephalosporins and vancomycin until we have culture info.   Doppler neg for DVTs  Admission WBC normal, no fever  ESR=60     CRP=70    1- Left femoral OM and intramedullary abscess   - Blood cultures NGTD   - MRI showed collection, intraosseous abscess   - Will follow cultures   - S/P IR drain placement on   - Continue cefepime 2gm q12 adjusted for renal function   - Continue vancomycin 1500mg daily, trough is therapeutic, will keep 15 to 20.   - Follow creatinine to adjust ABx doses, mild MERRILL/CKD  - Based on culture will need a long treatment with IV antibiotics will need a PICC line    2- COPD  - Continue azithromycin 250mg 3times weekly prophylactically   - Pulmonary consult noted  - On o2 and BiPAP at night time      Will follow.    Brandi العلي MD  Division of Infectious Diseases   Cell [phone] between 8am and 6pm   After 6pm and weekends please call ID service at [phone].     >35 minutes spent on total encounter assessing patient, examination, chart reivew, counseling and coordinating care by the attending physician/nurse/care manager.

## 2021-12-28 NOTE — PHYSICAL THERAPY INITIAL EVALUATION ADULT - ADDITIONAL COMMENTS
Patient reports that he lives with his wife in a private house with 14 steps to enter, 13 steps to bedroom. Independent with ADLs and mobility at baseline, has cane and RW at home.

## 2021-12-28 NOTE — PROGRESS NOTE ADULT - PROBLEM SELECTOR PLAN 9
- CAD s/p 3v CABG 2004, stents in 2019  - continue beta blocker   - D/w IR and cardio. Held Plavix and eliquis, and continue ASA 81 mg daily in setting of IR procedure yesterday  - Plavix and Eliquis ordered to re-start tomorrow as per IR recs.

## 2021-12-28 NOTE — PROGRESS NOTE ADULT - ASSESSMENT
COMMODORE TURNER 81 m 2021 [facility] P [phone]  1940  VERNONLY ESPINOZA    REVIEW OF SYMPTOMS      Able to give ROS  Yes     RELIABLE +/-   CONSTITUTIONAL Weakness Yes  Chills No   ENDOCRINE  No heat or cold intolerance    ALLERGY No hives  Sore throat No stridor  RESP Coughing blood no  Shortness of breath YES   NEURO No Headache  Confusion Pain neck No   CARDIAC No Chest pain No Palpitations   GI  Pain abdomen NO   Vomiting NO     PHYSICAL EXAM    HEENT Unremarkable  atraumatic   RESP Fair air entry EXP prolonged    Harsh breath sound Resp distres mild   CARDIAC S1 S2 No S3     NO JVD    ABDOMEN SOFT BS PRESENT NOT DISTENDED No hepatosplenomegaly PEDAL EDEMA present No calf tenderness  NO rash       ________________  HOSPITAL COURSE.   OM Femur   Cefepime 2.2    Vanco 1.5/d   IR BX   COPD on home O2  PREDNISONE Decreased 2 2021  NOCT BPAP   RESP FAILURE    PMH   COPD ASTHMA Former smoker 2019 FVC 2.20 58% Post 2.61 69% +18 FEV1   0.86 31% Post 0.89 32% +4%  FEV1% 39%     INTUBATION CAC 2018   CAD sp cabg pmh  PVD stents pmh   l FEM FX YR AGO With local episodic infectn    _____                            GOC. 2021 Full code   COVID STATUS.   ICU STAY. none  ABIO.    AZITHRO q MWF    Cefepime 2.2    Vanco 1.5/d          BEST PRACTICE ISSUES.                                                  HEAD OF BED ELEVATION. Yes  DVT PROPHYLAXIS.    2021 apixaba 5.2 (a f)                                      M[place]ORMICK PROPHYLAXIS.                                                                                         DIET.     cons carb                       INFECTION PROPHYLAXIS.        GENERAL ISSUES                                       ALLERGY.   shellfish                         WEIGHT.  2021 97                                   BMI.            2021 30  IV FLUIDS.                         PATIENT DATA   VITALS/PO/IO/VENT/DRIPS.   2021 afeb 86 100/60   2021 ra 935     ASSESSMENT/RECOMMENDATIONS.    RESP.   is on noct bpap    HEMODYNAMICS.   bp ok    VANCO t   --2021 VT 11.7-13 - 15     INFECTION.   Possible osteomyelitis   w -2021 w 6.5 - 6.9   mr femur with contr   Multiloculated nm enhancing abscess along ant and lateral aspect of mid to distal femur cw osteomyelitis   blod c  (-)   IR bx done  AZITHRO q MWF    Cefepime 2.2    Vanco 1.5/d     COPD   symbicort 160    pred 4 -> 2021 pred 2   2021 change dw spouse    symbicor   under control    CAD.   metoprolol 50.2    asa 81   2021 plavx     CHF   lasix 40     ANEMIA  Hb 2021 Hb 11   Monitor     CKD  Cr --2021 Cr 1.4- 1.5-1.3  Monitor       TIME SPENT   Over 25 minutes aggregate care time spent on encounter; activities included   direct patient care, counseling and/or coordinating care reviewing notes, lab data/ imaging , discussion with multidisciplinary team/ patient  /family and explaining in detail risks, benefits, alternatives  of the recommendations     COMMODORE TURNER 81 m 2021 [facility] P [phone]  1940  VERNON ESPINOZA

## 2021-12-28 NOTE — PROGRESS NOTE ADULT - PROBLEM SELECTOR PLAN 1
Intramedullary abscess in left femur.   - Pt with left leg pain exacerbated for 1 week. Pain now is resolved.   - h/o femur fracture many years ago with episodes of pain and infection in the old fracture area, had multiple surgeries and drainage with a long term antibiotic treatment, last one years ago.   - Doppler negative for DVT  - MRI w/w/o IV cont: Multiloculated rim-enhancing abscess along the anterior + lateral aspect of the mid to distal femur. Rec. f/u MRI w/wo contrast after therapy to r/o underlying mass/neoplasm. Probable intraosseous abscess within mid to proximal femur diaphysis near the superior aspect of the abscess, probable areas of sequestrum, involucrum, and cloaca formation when correlated with CT. Findings also concerning for adjacent chronic osteo.  - Afebrile, no leukocytosis.  - s/p IR drainage of total 80cc purulent material from 2 pockets, with cath placed to continues drainage   - Culture from drainage NGTD (preliminar).    - BCx x2 negative final.   - Continue cefepime and vanco - adjusted for renal dose. Day #10 (started on 12/18/21)  - Depends on final culture from drainage, will decide length of ABX  - ID ([place]), Ortho following

## 2021-12-29 LAB
ANION GAP SERPL CALC-SCNC: 5 MMOL/L — SIGNIFICANT CHANGE UP (ref 5–17)
BUN SERPL-MCNC: 23 MG/DL — SIGNIFICANT CHANGE UP (ref 7–23)
CALCIUM SERPL-MCNC: 9.9 MG/DL — SIGNIFICANT CHANGE UP (ref 8.5–10.1)
CHLORIDE SERPL-SCNC: 106 MMOL/L — SIGNIFICANT CHANGE UP (ref 96–108)
CO2 SERPL-SCNC: 30 MMOL/L — SIGNIFICANT CHANGE UP (ref 22–31)
CREAT SERPL-MCNC: 1.2 MG/DL — SIGNIFICANT CHANGE UP (ref 0.5–1.3)
GLUCOSE SERPL-MCNC: 154 MG/DL — HIGH (ref 70–99)
HCT VFR BLD CALC: 37.6 % — LOW (ref 39–50)
HGB BLD-MCNC: 11.4 G/DL — LOW (ref 13–17)
MCHC RBC-ENTMCNC: 26.3 PG — LOW (ref 27–34)
MCHC RBC-ENTMCNC: 30.3 GM/DL — LOW (ref 32–36)
MCV RBC AUTO: 86.8 FL — SIGNIFICANT CHANGE UP (ref 80–100)
NRBC # BLD: 0 /100 WBCS — SIGNIFICANT CHANGE UP (ref 0–0)
PLATELET # BLD AUTO: 315 K/UL — SIGNIFICANT CHANGE UP (ref 150–400)
POTASSIUM SERPL-MCNC: 3.8 MMOL/L — SIGNIFICANT CHANGE UP (ref 3.5–5.3)
POTASSIUM SERPL-SCNC: 3.8 MMOL/L — SIGNIFICANT CHANGE UP (ref 3.5–5.3)
RBC # BLD: 4.33 M/UL — SIGNIFICANT CHANGE UP (ref 4.2–5.8)
RBC # FLD: 13.3 % — SIGNIFICANT CHANGE UP (ref 10.3–14.5)
SODIUM SERPL-SCNC: 141 MMOL/L — SIGNIFICANT CHANGE UP (ref 135–145)
WBC # BLD: 5.69 K/UL — SIGNIFICANT CHANGE UP (ref 3.8–10.5)
WBC # FLD AUTO: 5.69 K/UL — SIGNIFICANT CHANGE UP (ref 3.8–10.5)

## 2021-12-29 PROCEDURE — 99232 SBSQ HOSP IP/OBS MODERATE 35: CPT

## 2021-12-29 PROCEDURE — 36573 INSJ PICC RS&I 5 YR+: CPT

## 2021-12-29 PROCEDURE — 99232 SBSQ HOSP IP/OBS MODERATE 35: CPT | Mod: GC

## 2021-12-29 PROCEDURE — 76937 US GUIDE VASCULAR ACCESS: CPT | Mod: 26

## 2021-12-29 RX ORDER — CEFTRIAXONE 500 MG/1
2000 INJECTION, POWDER, FOR SOLUTION INTRAMUSCULAR; INTRAVENOUS EVERY 24 HOURS
Refills: 0 | Status: DISCONTINUED | OUTPATIENT
Start: 2021-12-29 | End: 2021-12-30

## 2021-12-29 RX ORDER — SODIUM CHLORIDE 9 MG/ML
10 INJECTION INTRAMUSCULAR; INTRAVENOUS; SUBCUTANEOUS
Refills: 0 | Status: DISCONTINUED | OUTPATIENT
Start: 2021-12-29 | End: 2021-12-30

## 2021-12-29 RX ORDER — CHLORHEXIDINE GLUCONATE 213 G/1000ML
1 SOLUTION TOPICAL
Refills: 0 | Status: DISCONTINUED | OUTPATIENT
Start: 2021-12-29 | End: 2021-12-30

## 2021-12-29 RX ORDER — INSULIN LISPRO 100/ML
VIAL (ML) SUBCUTANEOUS AT BEDTIME
Refills: 0 | Status: DISCONTINUED | OUTPATIENT
Start: 2021-12-29 | End: 2021-12-30

## 2021-12-29 RX ADMIN — CEFEPIME 100 MILLIGRAM(S): 1 INJECTION, POWDER, FOR SOLUTION INTRAMUSCULAR; INTRAVENOUS at 06:04

## 2021-12-29 RX ADMIN — Medication 81 MILLIGRAM(S): at 12:43

## 2021-12-29 RX ADMIN — INSULIN GLARGINE 10 UNIT(S): 100 INJECTION, SOLUTION SUBCUTANEOUS at 21:53

## 2021-12-29 RX ADMIN — Medication 4: at 12:39

## 2021-12-29 RX ADMIN — ATORVASTATIN CALCIUM 80 MILLIGRAM(S): 80 TABLET, FILM COATED ORAL at 21:55

## 2021-12-29 RX ADMIN — TAMSULOSIN HYDROCHLORIDE 0.4 MILLIGRAM(S): 0.4 CAPSULE ORAL at 21:55

## 2021-12-29 RX ADMIN — Medication 4 UNIT(S): at 17:15

## 2021-12-29 RX ADMIN — Medication 650 MILLIGRAM(S): at 21:46

## 2021-12-29 RX ADMIN — TIOTROPIUM BROMIDE 1 CAPSULE(S): 18 CAPSULE ORAL; RESPIRATORY (INHALATION) at 08:19

## 2021-12-29 RX ADMIN — Medication 2: at 08:17

## 2021-12-29 RX ADMIN — AZITHROMYCIN 250 MILLIGRAM(S): 500 TABLET, FILM COATED ORAL at 06:03

## 2021-12-29 RX ADMIN — Medication 650 MILLIGRAM(S): at 00:15

## 2021-12-29 RX ADMIN — Medication 2 MILLIGRAM(S): at 06:03

## 2021-12-29 RX ADMIN — Medication 650 MILLIGRAM(S): at 19:50

## 2021-12-29 RX ADMIN — Medication 50 MILLIGRAM(S): at 06:04

## 2021-12-29 RX ADMIN — Medication 4 UNIT(S): at 08:17

## 2021-12-29 RX ADMIN — BUDESONIDE AND FORMOTEROL FUMARATE DIHYDRATE 2 PUFF(S): 160; 4.5 AEROSOL RESPIRATORY (INHALATION) at 06:04

## 2021-12-29 RX ADMIN — Medication 0: at 21:53

## 2021-12-29 RX ADMIN — BUDESONIDE AND FORMOTEROL FUMARATE DIHYDRATE 2 PUFF(S): 160; 4.5 AEROSOL RESPIRATORY (INHALATION) at 08:21

## 2021-12-29 RX ADMIN — APIXABAN 5 MILLIGRAM(S): 2.5 TABLET, FILM COATED ORAL at 17:19

## 2021-12-29 RX ADMIN — BUDESONIDE AND FORMOTEROL FUMARATE DIHYDRATE 2 PUFF(S): 160; 4.5 AEROSOL RESPIRATORY (INHALATION) at 19:02

## 2021-12-29 RX ADMIN — Medication 300 MILLIGRAM(S): at 12:43

## 2021-12-29 RX ADMIN — Medication 4 UNIT(S): at 12:39

## 2021-12-29 NOTE — PROGRESS NOTE ADULT - PROBLEM SELECTOR PROBLEM 6
Hypertension
Hyperlipidemia
Hypertension
Hypertension
Hyperlipidemia
Hypertension

## 2021-12-29 NOTE — PROGRESS NOTE ADULT - SUBJECTIVE AND OBJECTIVE BOX
Patient is a 81y old  Male who presents with a chief complaint of increasing left leg pain (24 Dec 2021 12:38)      INTERVAL HPI/OVERNIGHT EVENTS: Patient seen and examined at bedside. No overnight events occurred. Patient has no complaints at this time. Reports he feels well.     MEDICATIONS  (STANDING):  apixaban 5 milliGRAM(s) Oral two times a day  aspirin enteric coated 81 milliGRAM(s) Oral daily  atorvastatin 80 milliGRAM(s) Oral at bedtime  azithromycin   Tablet 250 milliGRAM(s) Oral <User Schedule>  budesonide 160 MICROgram(s)/formoterol 4.5 MICROgram(s) Inhaler 2 Puff(s) Inhalation two times a day  cefepime   IVPB 2000 milliGRAM(s) IV Intermittent every 12 hours  clopidogrel Tablet 75 milliGRAM(s) Oral daily  dextrose 40% Gel 15 Gram(s) Oral once  dextrose 5%. 1000 milliLiter(s) (50 mL/Hr) IV Continuous <Continuous>  dextrose 5%. 1000 milliLiter(s) (100 mL/Hr) IV Continuous <Continuous>  dextrose 5%. 1000 milliLiter(s) (100 mL/Hr) IV Continuous <Continuous>  dextrose 50% Injectable 25 Gram(s) IV Push once  dextrose 50% Injectable 12.5 Gram(s) IV Push once  dextrose 50% Injectable 25 Gram(s) IV Push once  glucagon  Injectable 1 milliGRAM(s) IntraMuscular once  glucagon  Injectable 1 milliGRAM(s) IntraMuscular once  insulin glargine Injectable (LANTUS) 10 Unit(s) SubCutaneous at bedtime  insulin lispro (ADMELOG) corrective regimen sliding scale   SubCutaneous three times a day before meals  insulin lispro Injectable (ADMELOG) 4 Unit(s) SubCutaneous before breakfast  insulin lispro Injectable (ADMELOG) 4 Unit(s) SubCutaneous before lunch  insulin lispro Injectable (ADMELOG) 4 Unit(s) SubCutaneous before dinner  metoprolol tartrate 50 milliGRAM(s) Oral two times a day  predniSONE   Tablet 2 milliGRAM(s) Oral daily  tamsulosin 0.4 milliGRAM(s) Oral at bedtime  tiotropium 18 MICROgram(s) Capsule 1 Capsule(s) Inhalation daily  vancomycin  IVPB 1500 milliGRAM(s) IV Intermittent every 24 hours    MEDICATIONS  (PRN):  acetaminophen     Tablet .. 650 milliGRAM(s) Oral every 6 hours PRN Mild Pain (1 - 3), Moderate Pain (4 - 6)  melatonin 5 milliGRAM(s) Oral at bedtime PRN Insomnia      Allergies    No Known Allergies    Intolerances    shellfish (Nausea)      REVIEW OF SYSTEMS:  CONSTITUTIONAL: No fever or chills  HEENT: No headache, no sore throat  RESPIRATORY: No cough, wheezing, or shortness of breath  CARDIOVASCULAR: No chest pain, palpitations  GASTROINTESTINAL: No abd pain, nausea, vomiting, or diarrhea  GENITOURINARY: No dysuria, frequency, or hematuria  NEUROLOGICAL: no focal weakness or dizziness  MUSCULOSKELETAL: no myalgias     Vital Signs Last 24 Hrs  T(C): 36.4 (29 Dec 2021 05:30), Max: 36.7 (28 Dec 2021 20:21)  T(F): 97.6 (29 Dec 2021 05:30), Max: 98.1 (28 Dec 2021 20:21)  HR: 81 (29 Dec 2021 05:30) (75 - 86)  BP: 104/69 (29 Dec 2021 05:30) (104/69 - 118/72)  BP(mean): --  RR: 19 (29 Dec 2021 05:30) (18 - 19)  SpO2: 97% (29 Dec 2021 05:30) (93% - 98%)    PHYSICAL EXAM:  GENERAL: NAD, sitting in bed  HEENT:  anicteric, moist mucous membranes  CHEST/LUNG:  CTA b/l, no rales, wheezes, or rhonchi  HEART:  RRR, S1, S2  ABDOMEN:  BS+, soft, nontender, nondistended  EXTREMITIES: no edema, cyanosis, or calf tenderness, L thigh with drain in place  NERVOUS SYSTEM: answers questions and follows commands appropriately    LABS:                        11.4   5.69  )-----------( 315      ( 29 Dec 2021 08:42 )             37.6     CBC Full  -  ( 29 Dec 2021 08:42 )  WBC Count : 5.69 K/uL  Hemoglobin : 11.4 g/dL  Hematocrit : 37.6 %  Platelet Count - Automated : 315 K/uL  Mean Cell Volume : 86.8 fl  Mean Cell Hemoglobin : 26.3 pg  Mean Cell Hemoglobin Concentration : 30.3 gm/dL  Auto Neutrophil # : x  Auto Lymphocyte # : x  Auto Monocyte # : x  Auto Eosinophil # : x  Auto Basophil # : x  Auto Neutrophil % : x  Auto Lymphocyte % : x  Auto Monocyte % : x  Auto Eosinophil % : x  Auto Basophil % : x    29 Dec 2021 08:42    141    |  106    |  23     ----------------------------<  154    3.8     |  30     |  1.20     Ca    9.9        29 Dec 2021 08:42          CAPILLARY BLOOD GLUCOSE      POCT Blood Glucose.: 169 mg/dL (29 Dec 2021 07:54)  POCT Blood Glucose.: 108 mg/dL (28 Dec 2021 21:03)  POCT Blood Glucose.: 270 mg/dL (28 Dec 2021 18:19)  POCT Blood Glucose.: 223 mg/dL (28 Dec 2021 16:43)  POCT Blood Glucose.: 355 mg/dL (28 Dec 2021 13:30)        Culture - Abscess with Gram Stain (collected 12-27-21 @ 17:37)  Source: .Abscess Left femur/perifemur  Preliminary Report (12-28-21 @ 12:58):    No growth        RADIOLOGY & ADDITIONAL TESTS:    Personally reviewed.     Consultant(s) Notes Reviewed:  [x] YES  [ ] NO

## 2021-12-29 NOTE — PROGRESS NOTE ADULT - PROBLEM SELECTOR PLAN 3
- CKD 3, Baseline Cr 1.2 - 1.5 as per nephro  - Cr on admission 1.60, improved  - Losartan initially held due to MERRILL, will continue to hold as BPs soft  - DASH diet

## 2021-12-29 NOTE — PROGRESS NOTE ADULT - PROBLEM SELECTOR PROBLEM 9
CAD (coronary artery disease)
CAD (coronary artery disease)
PVD (peripheral vascular disease)
CAD (coronary artery disease)
PVD (peripheral vascular disease)
CAD (coronary artery disease)

## 2021-12-29 NOTE — PROGRESS NOTE ADULT - SUBJECTIVE AND OBJECTIVE BOX
[facility] -- Saud Aguilar, Génesis Louise, Carroll Bass, Carolina Heath: Office # [phone]    Follow Up:  CAD s/p CABG/stents, Cardiac Optimization    Subjective/Observations: Patient seen and examined, awake, alert, resting comfortably in bed, denies chest pain, dyspnea, palpitations or dizziness, orthopnea and PND. Tolerating room air. left thigh dressing with MELANIA drain x1     REVIEW OF SYSTEMS: All review of systems is negative for eye, ENT, GI, , allergic, dermatologic, musculoskeletal and neurologic except as described above    PAST MEDICAL & SURGICAL HISTORY:  Diabetes Mellitus, Type II    CAD (Coronary Artery Disease)  s/p 3v CABG 2004; stents placed in [place] in 2019    Dyslipidemia    Osteomyelitis    COPD (chronic obstructive pulmonary disease)  on 2L at home and BiPAP at night; intubated 6/18    Hypertension    PVD (peripheral vascular disease)    History of PAT (paroxysmal atrial tachycardia)    CABG (Coronary Artery Bypass Graft)  2004    Compound fracture  left leg    S/P primary angioplasty with coronary stent        MEDICATIONS  (STANDING):  apixaban 5 milliGRAM(s) Oral two times a day  aspirin enteric coated 81 milliGRAM(s) Oral daily  atorvastatin 80 milliGRAM(s) Oral at bedtime  azithromycin   Tablet 250 milliGRAM(s) Oral <User Schedule>  budesonide 160 MICROgram(s)/formoterol 4.5 MICROgram(s) Inhaler 2 Puff(s) Inhalation two times a day  cefepime   IVPB 2000 milliGRAM(s) IV Intermittent every 12 hours  clopidogrel Tablet 75 milliGRAM(s) Oral daily  dextrose 40% Gel 15 Gram(s) Oral once  dextrose 5%. 1000 milliLiter(s) (50 mL/Hr) IV Continuous <Continuous>  dextrose 5%. 1000 milliLiter(s) (100 mL/Hr) IV Continuous <Continuous>  dextrose 5%. 1000 milliLiter(s) (100 mL/Hr) IV Continuous <Continuous>  dextrose 50% Injectable 25 Gram(s) IV Push once  dextrose 50% Injectable 12.5 Gram(s) IV Push once  dextrose 50% Injectable 25 Gram(s) IV Push once  glucagon  Injectable 1 milliGRAM(s) IntraMuscular once  glucagon  Injectable 1 milliGRAM(s) IntraMuscular once  insulin glargine Injectable (LANTUS) 10 Unit(s) SubCutaneous at bedtime  insulin lispro (ADMELOG) corrective regimen sliding scale   SubCutaneous three times a day before meals  insulin lispro Injectable (ADMELOG) 4 Unit(s) SubCutaneous before breakfast  insulin lispro Injectable (ADMELOG) 4 Unit(s) SubCutaneous before lunch  insulin lispro Injectable (ADMELOG) 4 Unit(s) SubCutaneous before dinner  metoprolol tartrate 50 milliGRAM(s) Oral two times a day  predniSONE   Tablet 2 milliGRAM(s) Oral daily  tamsulosin 0.4 milliGRAM(s) Oral at bedtime  tiotropium 18 MICROgram(s) Capsule 1 Capsule(s) Inhalation daily  vancomycin  IVPB 1500 milliGRAM(s) IV Intermittent every 24 hours    MEDICATIONS  (PRN):  acetaminophen     Tablet .. 650 milliGRAM(s) Oral every 6 hours PRN Mild Pain (1 - 3), Moderate Pain (4 - 6)  melatonin 5 milliGRAM(s) Oral at bedtime PRN Insomnia    Allergies    No Known Allergies    Intolerances    shellfish (Nausea)    Vital Signs Last 24 Hrs  T(C): 36.8 (29 Dec 2021 12:41), Max: 36.8 (29 Dec 2021 12:41)  T(F): 98.2 (29 Dec 2021 12:41), Max: 98.2 (29 Dec 2021 12:41)  HR: 56 (29 Dec 2021 12:41) (56 - 86)  BP: 114/68 (29 Dec 2021 12:41) (104/69 - 118/72)  BP(mean): --  RR: 17 (29 Dec 2021 12:41) (17 - 19)  SpO2: 94% (29 Dec 2021 12:41) (94% - 98%)  I&O's Summary    28 Dec 2021 07:01  -  29 Dec 2021 07:00  --------------------------------------------------------  IN: 0 mL / OUT: 900 mL / NET: -900 mL    29 Dec 2021 07:01  -  29 Dec 2021 12:43  --------------------------------------------------------  IN: 0 mL / OUT: 50 mL / NET: -50 mL    TELE: Not on telemetry   PHYSICAL EXAM:  Appearance: NAD, no distress, alert,  HEENT: Moist Mucous Membranes, Anicteric  Cardiovascular: Regular rate and rhythm, Normal S1 S2, No JVD, No murmurs, No rubs, gallops or clicks  Respiratory: Non-labored, Clear to auscultation, No rales, No rhonchi, No wheezing.   Gastrointestinal:  Soft, Non-tender, + BS  Neurologic: Non-focal  Skin: Warm and dry, No visible rashes or ulcers, No ecchymosis, No cyanosis, left thigh dressing intact MELANIA x 1  Musculoskeletal: No clubbing, No cyanosis, No joint swelling/tenderness  Psychiatry: Mood & affect appropriate  Lymph: No peripheral edema.     LABS: All Labs Reviewed:                        11.4   5.69  )-----------( 315      ( 29 Dec 2021 08:42 )             37.6                         11.5   5.29  )-----------( 331      ( 28 Dec 2021 09:26 )             37.0                         11.4   6.63  )-----------( 342      ( 27 Dec 2021 08:22 )             37.5     29 Dec 2021 08:42    141    |  106    |  23     ----------------------------<  154    3.8     |  30     |  1.20   28 Dec 2021 09:26    141    |  107    |  21     ----------------------------<  184    3.8     |  27     |  1.20   27 Dec 2021 08:22    141    |  106    |  24     ----------------------------<  195    4.0     |  29     |  1.30     Ca    9.9        29 Dec 2021 08:42  Ca    9.6        28 Dec 2021 09:26  Ca    10.0       27 Dec 2021 08:22  Phos  2.5       28 Dec 2021 09:26  Mg     2.2       28 Dec 2021 09:26                      12 Lead ECG:   Ventricular Rate 74 BPM    Atrial Rate 74 BPM    P-R Interval 148 ms    QRS Duration 86 ms    Q-T Interval 368 ms    QTC Calculation(Bazett) 408 ms    P Axis 65 degrees    R Axis 71 degrees    T Axis 62 degrees    Diagnosis Line Normal sinus rhythmwith sinus arrhythmia  Confirmed by MICHAEL BASS ([id number]) on 12/20/2021 12:42:13 PM (12-20-21 @ 11:05)    < from: Xray Chest 1 View AP/PA (12.17.21 @ 17:09) >    ACC: [id number] EXAM:  XR CHEST AP OR PA 1V                          PROCEDURE DATE:  12/17/2021          INTERPRETATION:  Evaluate for pneumonia    AP chest. Prior 9/3/2021.    Median sternotomy. Normal heart mediastinum. Hyperinflated lungs. No   consolidation or effusion.    IMPRESSION: Hyperinflated lungs. No active infiltrates    --- End of Report ---            RODRIGO FAITH MD; Attending Radiologist  This document has been electronically signed. Dec 18 2021  9:39AM    < end of copied text >  < from: TTE Echo Complete w/o Contrast w/ Doppler (12.20.21 @ 08:28) >     EXAM:  ECHO TTE WO CON COMP W DOPP         PROCEDURE DATE:  12/20/2021        INTERPRETATION:  INDICATION: Ischemic heart disease  sonographer KL    Blood Pressure 123/70    Height 180.3 cm     Weight 97.5 kg       BSA 2.2   sq m    Dimensions:  LA 3.0       Normal Values: 2.0 - 4.0 cm  Ao 3.5        Normal Values: 2.0 - 3.8 cm  SEPTUM 1.3       Normal Values: 0.6 - 1.2 cm  PWT 1.0       Normal Values: 0.6 - 1.1 cm  LVIDd 4.3         Normal Values: 3.0 - 5.6 cm  LVIDs 1.8         Normal Values: 1.8 - 4.0 cm      OBSERVATIONS:  Technically difficult and limited study  Mitral Valve: normal, trace physiologic MR.  Aortic Valve/Aorta: Sclerotic trileaflet aortic valve with normal opening.  Tricuspid Valve: Not well-visualized  Pulmonic Valve: Not well-visualized  Left Atrium: normal  Right Atrium: Not well-visualized  Left Ventricle: The left ventricular endocardium is not well-visualized.   Overall normal systolic function with an estimated LVEF of 55%. Cannot   evaluate for segmental abnormalities  Right Ventricle: Not well-visualized  Pericardium: no significant pericardial effusion.  Pulmonary/RV Pressure: estimated PA systolic pressure of 28 mmHg  LV diastolic dysfunction is present        IMPRESSION:  Technically difficult and limited study  The left ventricular endocardium is not well-visualized. Overall normal   systolic function with an estimated LVEF of 55-60%. Cannot evaluate for   segmental abnormalities  The right ventricle is not well-visualized  Sclerotic trileaflet aortic valve, without AI.  Trace physiologic MR  No significant pericardial effusion.    --- End of Report ---              ARISTIDES LEE MD; Attending Cardiologist  This document has been electronically signed. Dec 21 2021  1:38PM    < end of copied text >

## 2021-12-29 NOTE — PROGRESS NOTE ADULT - SUBJECTIVE AND OBJECTIVE BOX
[facility]  INFECTIOUS DISEASES   [address] [place] [address]  Tel: [phone]     Fax: [phone]  ======================================================  MD Noman Perry Kaushal, MD Cho, Michelle, MD   ======================================================    [facility]-[id number]  [place]     Follow up: Left femoral OM and intramedullary abscess     Patient seen and examined, no overnight event.   No fever, no new complaint, no new changes.   S/P IR drainage of 60ML purulent fluid, now has drain in place.     PAST MEDICAL & SURGICAL HISTORY:  Diabetes Mellitus, Type II  CAD (Coronary Artery Disease)  s/p 3v CABG ; stents placed [place] in   Dyslipidemia  Osteomyelitis  COPD (chronic obstructive pulmonary disease)  on 2L at home and BiPAP at night; intubated   Hypertension  PVD (peripheral vascular disease)  History of PAT (paroxysmal atrial tachycardia)  CABG (Coronary Artery Bypass Graft)    Compound fracture  left leg  S/P primary angioplasty with coronary stent    Social Hx: Former smoker, no ETOH or drugs     FAMILY HISTORY:  Family history of diabetes mellitus (Sibling)  Family hx of lung cancer  brother,  age 82, used to smoke with pt  Allergies  No Known Allergies    Intolerances  shellfish (Nausea)    Antibiotics:  MEDICATIONS  (STANDING):  atorvastatin 80 milliGRAM(s) Oral at bedtime  azithromycin   Tablet 250 milliGRAM(s) Oral <User Schedule>  budesonide 160 MICROgram(s)/formoterol 4.5 MICROgram(s) Inhaler 2 Puff(s) Inhalation two times a day  cefepime   IVPB 2000 milliGRAM(s) IV Intermittent every 12 hours  clopidogrel Tablet 75 milliGRAM(s) Oral daily  dextrose 40% Gel 15 Gram(s) Oral once  dextrose 5%. 1000 milliLiter(s) (50 mL/Hr) IV Continuous <Continuous>  dextrose 5%. 1000 milliLiter(s) (100 mL/Hr) IV Continuous <Continuous>  dextrose 50% Injectable 25 Gram(s) IV Push once  dextrose 50% Injectable 12.5 Gram(s) IV Push once  dextrose 50% Injectable 25 Gram(s) IV Push once  glucagon  Injectable 1 milliGRAM(s) IntraMuscular once  insulin lispro (ADMELOG) corrective regimen sliding scale   SubCutaneous three times a day before meals  insulin lispro (ADMELOG) corrective regimen sliding scale   SubCutaneous at bedtime  metoprolol tartrate 50 milliGRAM(s) Oral two times a day  predniSONE   Tablet 4 milliGRAM(s) Oral daily  sodium chloride 0.9%. 1000 milliLiter(s) (60 mL/Hr) IV Continuous <Continuous>  tamsulosin 0.4 milliGRAM(s) Oral at bedtime  tiotropium 18 MICROgram(s) Capsule 1 Capsule(s) Inhalation daily  vancomycin  IVPB 1500 milliGRAM(s) IV Intermittent every 24 hours     REVIEW OF SYSTEMS:  CONSTITUTIONAL:  No Fever or chills  HEENT:  No diplopia or blurred vision.  No sore throat or runny nose.  CARDIOVASCULAR:  No chest pain or SOB.  RESPIRATORY:  No cough, shortness of breath, PND or orthopnea.  GASTROINTESTINAL:  No nausea, vomiting or diarrhea.  GENITOURINARY:  No dysuria, frequency or urgency. No Blood in urine  MUSCULOSKELETAL: left leg pain   SKIN:  No change in skin, hair or nails.  NEUROLOGIC:  No paresthesias, fasciculations, seizures or weakness.  PSYCHIATRIC:  No disorder of thought or mood.  ENDOCRINE:  No heat or cold intolerance, polyuria or polydipsia.  HEMATOLOGICAL:  No easy bruising or bleeding.     Physical Exam:  Vital Signs Last 24 Hrs  T(C): 36.8 (29 Dec 2021 12:41), Max: 36.8 (29 Dec 2021 12:41)  T(F): 98.2 (29 Dec 2021 12:41), Max: 98.2 (29 Dec 2021 12:41)  HR: 56 (29 Dec 2021 12:41) (56 - 86)  BP: 114/68 (29 Dec 2021 12:41) (104/69 - 118/72)  BP(mean): --  RR: 17 (29 Dec 2021 12:41) (17 - 19)  SpO2: 94% (29 Dec 2021 12:41) (94% - 98%)  GEN: NAD, obese   HEENT: normocephalic and atraumatic. EOMI. PERRL.    NECK: Supple.  No lymphadenopathy   LUNGS: Clear to auscultation.  HEART: Regular rate and rhythm   ABDOMEN: Soft, nontender, and nondistended.  Positive bowel sounds.    : No CVA tenderness  EXTREMITIES: left leg in thigh area has a scar in lateral side with no discharge or open wound.  Swelling in anterior part, no tenderness or fluctuation. No sign of cellulitis on soft tissue.   NEUROLOGIC: grossly intact.  PSYCHIATRIC: Appropriate affect .  SKIN: No rash, as above       Labs:                        11.4   5.69  )-----------( 315      ( 29 Dec 2021 08:42 )             37.6         141  |  106  |  23  ----------------------------<  154<H>  3.8   |  30  |  1.20    Ca    9.9      29 Dec 2021 08:42  Phos  2.5       Mg     2.2         Culture - Abscess with Gram Stain (collected 21 @ 17:37)  Source: .Abscess Left femur/perifemur    Culture - Blood (collected 21 @ 21:22)  Source: .Blood Blood  Final Report (21 @ 22:01):    No Growth Final    Culture - Blood (collected 21 @ 21:22)  Source: .Blood Blood  Final Report (21 @ 22:01):    No Growth Final    WBC Count: 5.69 K/uL (21 @ 08:42)  WBC Count: 5.29 K/uL (21 @ 09:26)  WBC Count: 6.63 K/uL (21 @ 08:22)  WBC Count: 6.23 K/uL (21 @ 08:29)    Creatinine, Serum: 1.20 mg/dL (21 @ 08:42)  Creatinine, Serum: 1.20 mg/dL (21 @ 09:26)  Creatinine, Serum: 1.30 mg/dL (21 @ 08:22)  Creatinine, Serum: 1.30 mg/dL (21 @ 08:29)  Creatinine, Serum: 1.40 mg/dL (21 @ 16:45)    C-Reactive Protein, Serum: 11 mg/L (21 @ 15:15)  C-Reactive Protein, Serum: 9 mg/L (21 @ 12:13)  C-Reactive Protein, Serum: 81 mg/L (21 @ 15:37)  C-Reactive Protein, Serum: 70 mg/L (21 @ 17:04)    Sedimentation Rate, Erythrocyte: 23 mm/hr (21 @ 10:28)  Sedimentation Rate, Erythrocyte: 25 mm/hr (21 @ 08:22)  Sedimentation Rate, Erythrocyte: 60 mm/hr (21 @ 09:06)  Sedimentation Rate, Erythrocyte: 42 mm/hr (21 @ 10:44)    COVID-19 PCR: NotDetec (21 @ 07:54)  COVID-19 PCR: NotDetec (21 @ 17:43)    All imaging and other data have been reviewed.  < from: CT Femur No Cont, Left (21 @ 22:24) >  IMPRESSION:  Old healed deformity of the mid left femur likely correlating with the   history of a prior osteomyelitis. Complex heterogeneous lobulated soft   tissue anterior and lateral to the middle two thirds of the left femur   concerning for an abscess. New lobulated intramedullary defect with tract   extending to the cortical surface in the mid left femur concerning for a   possible intramedullary abscess. A contrast-enhancedMRI of the left   femur is recommended for further evaluation.    Assessment and Plan:   82yo man with PMH of HTN, COPD on home O2, CAD s/p CABG, PVD s/p stents in b/l legs and left femoral fracture more than 50 years ago, was admitted with left leg pain on the site of old fracture after CT showed possible intramedullary abscess. He has had pain and infection in the old fracture area at least 3-4 times in the past, had multiple surgeries and drainage of area with a long term antibiotic treatment, last one years ago.   Most likely another infection and osteomyelitis with collection s/p IR drain placement.   Doppler neg for DVTs  Admission WBC normal, no fever  ESR=60-->23     CRP=70-->11  Usually MRSA grows in cultures, if negative most likely MRSA is not the cause of infection. Collection looked infectious but culture is negative, will narrow down the coverage to ceftriaxone since no sign of pseudomonas.   Ceftriaxone has a good penetration to bone and joint.     1- Left femoral OM and intramedullary abscess   - Blood cultures NGTD   - MRI showed collection, intraosseous abscess   - S/P IR drain placement on   - IR culture NGTD  - Will switch cefepime to ceftriaxone 2gm daily Covers MSSA, GNR  - Will stop vancomycin for now, since cultures is not growing MRSA  - Will need a long treatment course will plan for last day on   - Needs a PICC line  - Follow up with ortho and ID after discharge     2- COPD  - Continue azithromycin 250mg 3times weekly prophylactically   - Pulmonary consult noted  - On o2 and BiPAP at night time      Will follow.    Brandi العلي MD  Division of Infectious Diseases   Cell [phone] between 8am and 6pm   After 6pm and weekends please call ID service at [phone].     >35 minutes spent on total encounter assessing patient, examination, chart reivew, counseling and coordinating care by the attending physician/nurse/care manager.

## 2021-12-29 NOTE — PROGRESS NOTE ADULT - PROBLEM SELECTOR PLAN 4
- chronic, stable   - on home 2L NC PRN; continue as needed.  Pt has been sat >90% at RA.   - on chronic po prednisone, decreased to 2 mg po daily 12/25  - BIPAP at night; will continue with home settings  - c/w home azithro M, W, F  - c/w home montelukast  - Pt on home breo ellipta and Incruse ellipta -- therapeutic interchange with Symbicort and tiotropium - chronic, stable   - on home 2L NC PRN; continue as needed.  Pt has been sat >90% at RA.   - on chronic po prednisone, decreased to 2 mg po daily 12/25  - BIPAP at night; will continue with home settings  - c/w home evonne LAUREANO, W, F  - Pt on home breo ellipta and Incruse ellipta -- therapeutic interchange with Symbicort and tiotropium

## 2021-12-29 NOTE — PROGRESS NOTE ADULT - PROBLEM SELECTOR PROBLEM 7
Hyperlipidemia
History of PAT (paroxysmal atrial tachycardia)
Hyperlipidemia
Hyperlipidemia
History of PAT (paroxysmal atrial tachycardia)
Hyperlipidemia
Hyperlipidemia
History of PAT (paroxysmal atrial tachycardia)
Hyperlipidemia
History of PAT (paroxysmal atrial tachycardia)

## 2021-12-29 NOTE — PROGRESS NOTE ADULT - ATTENDING COMMENTS
Pt. seen and evaluated for left femur abscess and chronic osteomyelitis.  Pt. is in no distress.  Tolerating IV antibiotics.  Denies any SOB or CP.  Physical examination and plan as above.  Will continue course of IV antibiotics.  Abscess culture so far with no growth.  ID f/u

## 2021-12-29 NOTE — PROGRESS NOTE ADULT - SUBJECTIVE AND OBJECTIVE BOX
YUE     PLV 1EAS 107 W1    Allergies    No Known Allergies    Intolerances    shellfish (Nausea)      PAST MEDICAL & SURGICAL HISTORY:  Diabetes Mellitus, Type II    CAD (Coronary Artery Disease)  s/p 3v CABG ; [facility]    Dyslipidemia    Osteomyelitis    COPD (chronic obstructive pulmonary disease)  on 2L at home and BiPAP at night; intubated     Hypertension    PVD (peripheral vascular disease)    History of PAT (paroxysmal atrial tachycardia)    CABG (Coronary Artery Bypass Graft)      Compound fracture  left leg    S/P primary angioplasty with coronary stent        FAMILY HISTORY:  Family history of diabetes mellitus (Sibling)    Family hx of lung cancer  brother,  age 82, used to smoke with pt        Home Medications:  azithromycin 250 mg oral tablet: 1 tab(s) orally Monday, Wednesday, and Friday (18 Dec 2021 01:56)  Breo Ellipta 200 mcg-25 mcg/inh inhalation powder: 1 puff(s) inhaled once a day (18 Dec 2021 01:56)  Incruse Ellipta 62.5 mcg/inh inhalation powder: 1 puff(s) inhaled every 24 hours (18 Dec 2021 01:56)  Jardiance 25 mg oral tablet: 1 tab(s) orally once a day (in the morning) (18 Dec 2021 01:56)  losartan 25 mg oral tablet: 1 tab(s) orally once a day (18 Dec 2021 01:56)  metFORMIN 1000 mg oral tablet: 1 tab(s) orally 2 times a day w/food  please resume the dose on 08/10/2021  (18 Dec 2021 01:56)  NovoLOG FlexPen 100 units/mL injectable solution: Inject 5 units at BS over 200, 10 units at BS over 300, and 15 units at BS over 400 (18 Dec 2021 01:56)  Nucala 100 mg subcutaneous injection: 100 milligram(s) subcutaneous every 4 weeks    *Last given 21* (18 Dec 2021 01:56)  predniSONE 2 mg oral delayed release tablet: 2 tab(s) orally once a day (18 Dec 2021 01:56)  rosuvastatin 20 mg oral tablet: 1 tab(s) orally once a day (at bedtime) (18 Dec 2021 01:56)  tamsulosin 0.4 mg oral capsule: 1 cap(s) orally once a day (at bedtime) (18 Dec 2021 01:56)      MEDICATIONS  (STANDING):  apixaban 5 milliGRAM(s) Oral two times a day  aspirin enteric coated 81 milliGRAM(s) Oral daily  atorvastatin 80 milliGRAM(s) Oral at bedtime  azithromycin   Tablet 250 milliGRAM(s) Oral <User Schedule>  budesonide 160 MICROgram(s)/formoterol 4.5 MICROgram(s) Inhaler 2 Puff(s) Inhalation two times a day  cefepime   IVPB 2000 milliGRAM(s) IV Intermittent every 12 hours  clopidogrel Tablet 75 milliGRAM(s) Oral daily  dextrose 40% Gel 15 Gram(s) Oral once  dextrose 5%. 1000 milliLiter(s) (50 mL/Hr) IV Continuous <Continuous>  dextrose 5%. 1000 milliLiter(s) (100 mL/Hr) IV Continuous <Continuous>  dextrose 5%. 1000 milliLiter(s) (100 mL/Hr) IV Continuous <Continuous>  dextrose 50% Injectable 25 Gram(s) IV Push once  dextrose 50% Injectable 25 Gram(s) IV Push once  dextrose 50% Injectable 12.5 Gram(s) IV Push once  glucagon  Injectable 1 milliGRAM(s) IntraMuscular once  glucagon  Injectable 1 milliGRAM(s) IntraMuscular once  insulin glargine Injectable (LANTUS) 10 Unit(s) SubCutaneous at bedtime  insulin lispro (ADMELOG) corrective regimen sliding scale   SubCutaneous three times a day before meals  insulin lispro Injectable (ADMELOG) 4 Unit(s) SubCutaneous before breakfast  insulin lispro Injectable (ADMELOG) 4 Unit(s) SubCutaneous before lunch  insulin lispro Injectable (ADMELOG) 4 Unit(s) SubCutaneous before dinner  metoprolol tartrate 50 milliGRAM(s) Oral two times a day  predniSONE   Tablet 2 milliGRAM(s) Oral daily  tamsulosin 0.4 milliGRAM(s) Oral at bedtime  tiotropium 18 MICROgram(s) Capsule 1 Capsule(s) Inhalation daily  vancomycin  IVPB 1500 milliGRAM(s) IV Intermittent every 24 hours    MEDICATIONS  (PRN):  acetaminophen     Tablet .. 650 milliGRAM(s) Oral every 6 hours PRN Mild Pain (1 - 3), Moderate Pain (4 - 6)  melatonin 5 milliGRAM(s) Oral at bedtime PRN Insomnia      Diet, Consistent Carbohydrate w/Evening Snack:   DASH/TLC Sodium & Cholesterol Restricted (21 @ 17:43) [Active]          Vital Signs Last 24 Hrs  T(C): 36.7 (28 Dec 2021 20:21), Max: 36.7 (28 Dec 2021 20:21)  T(F): 98.1 (28 Dec 2021 20:21), Max: 98.1 (28 Dec 2021 20:21)  HR: 81 (29 Dec 2021 05:03) (75 - 86)  BP: 118/72 (28 Dec 2021 20:21) (109/61 - 118/72)  BP(mean): --  RR: 18 (28 Dec 2021 20:21) (18 - 19)  SpO2: 97% (29 Dec 2021 05:03) (93% - 98%)      21 @ 07:01  -  21 @ 07:00  --------------------------------------------------------  IN: 0 mL / OUT: 35 mL / NET: -35 mL    21 @ 07:01  -  21 @ 06:00  --------------------------------------------------------  IN: 0 mL / OUT: 900 mL / NET: -900 mL              LABS:                        11.5   5.29  )-----------( 331      ( 28 Dec 2021 09:26 )             37.0         141  |  107  |  21  ----------------------------<  184<H>  3.8   |  27  |  1.20    Ca    9.6      28 Dec 2021 09:26  Phos  2.5       Mg     2.2           PT/INR - ( 27 Dec 2021 08:22 )   PT: 13.0 sec;   INR: 1.12 ratio         PTT - ( 27 Dec 2021 08:22 )  PTT:36.9 sec          WBC:  WBC Count: 5.29 K/uL ( @ 09:26)  WBC Count: 6.63 K/uL ( @ 08:22)  WBC Count: 6.23 K/uL ( @ 08:29)      MICROBIOLOGY:  RECENT CULTURES:   .Abscess Left femur/perifemur XXXX XXXX   No growth                PT/INR - ( 27 Dec 2021 08:22 )   PT: 13.0 sec;   INR: 1.12 ratio         PTT - ( 27 Dec 2021 08:22 )  PTT:36.9 sec    Sodium:  Sodium, Serum: 141 mmol/L ( @ 09:26)  Sodium, Serum: 141 mmol/L ( @ 08:22)  Sodium, Serum: 141 mmol/L ( @ 08:29)  Sodium, Serum: 142 mmol/L ( @ 16:45)      1.20 mg/dL  @ 09:26  1.30 mg/dL  @ 08:22  1.30 mg/dL  08:29  1.40 mg/dL  @ 16:45      Hemoglobin:  Hemoglobin: 11.5 g/dL ( @ 09:26)  Hemoglobin: 11.4 g/dL ( @ 08:22)  Hemoglobin: 10.9 g/dL ( @ 08:29)      Platelets: Platelet Count - Automated: 331 K/uL ( @ 09:26)  Platelet Count - Automated: 342 K/uL ( @ 08:22)  Platelet Count - Automated: 311 K/uL ( @ 08:29)              RADIOLOGY & ADDITIONAL STUDIES:      MICROBIOLOGY:  RECENT CULTURES:   .Abscess Left femur/perifemur XXXX XXXX   No growth

## 2021-12-29 NOTE — PROGRESS NOTE ADULT - PROBLEM SELECTOR PLAN 5
- Chronic,  HbA1c 7.6  - Hold home metformin and Jardiance  - Continue MDSS   - hypoglycemia protocol, accuchecks   - Patient on low dose prednisone for COPD  - Continue lantus at increased dose of 10u qhs bedtime  - Admelog 4U TID before meals  - Endocrino Dr. Coughlin following. - Chronic,  HbA1c 7.6  - Hold home metformin and Jardiance  - Continue MDSS   - hypoglycemia protocol, accuchecks   - Patient on low dose prednisone for COPD  - Continue lantus at increased dose of 10u qhs bedtime  - Admelog 4U TID before breakfast and lunch  - Endocrino Dr. Coughlin following.

## 2021-12-29 NOTE — PROGRESS NOTE ADULT - PROBLEM SELECTOR PLAN 1
Intramedullary abscess in left femur.   - Pt with left leg pain exacerbated for 1 week. Pain now is resolved.   - h/o femur fracture many years ago with episodes of pain and infection in the old fracture area, had multiple surgeries and drainage with a long term antibiotic treatment, last one years ago.   - Doppler negative for DVT  - MRI w/w/o IV cont: Multiloculated rim-enhancing abscess along the anterior + lateral aspect of the mid to distal femur. Rec. f/u MRI w/wo contrast after therapy to r/o underlying mass/neoplasm. Probable intraosseous abscess within mid to proximal femur diaphysis near the superior aspect of the abscess, probable areas of sequestrum, involucrum, and cloaca formation when correlated with CT. Findings also concerning for adjacent chronic osteo.  - Afebrile, no leukocytosis.  - s/p IR drainage of total 80cc purulent material from 2 pockets, 1 MELANIA drainage in place  - Culture from drainage NGTD (prelim)  - BCx x2 negative final.   - Continue cefepime and vanco - adjusted for renal dose. Day #10 (started on 12/18/21)  - ID to advise regarding length of abx needed pending final culture. May require PICC prior to DC  - ID (Severiano), Ortho following Intramedullary abscess in left femur, POD #2 s/p IR drainage  - Pt with left leg pain exacerbated for 1 week. Pain now is resolved.   - h/o femur fracture many years ago with episodes of pain and infection in the old fracture area, had multiple surgeries and drainage with a long term antibiotic treatment, last one years ago.   - Doppler negative for DVT  - MRI w/w/o IV cont: Multiloculated rim-enhancing abscess along the anterior + lateral aspect of the mid to distal femur. Rec. f/u MRI w/wo contrast after therapy to r/o underlying mass/neoplasm. Probable intraosseous abscess within mid to proximal femur diaphysis near the superior aspect of the abscess, probable areas of sequestrum, involucrum, and cloaca formation when correlated with CT. Findings also concerning for adjacent chronic osteo.  - Afebrile, no leukocytosis.  - s/p IR drainage of total 80cc purulent material from 2 pockets, 1 MELANIA drainage in place  - Culture from drainage NGTD (prelim)  - BCx x2 negative final.   - Continue cefepime and vanco - adjusted for renal dose. Day #10 (started on 12/18/21)  - ID to advise regarding length of abx needed pending final culture. May require PICC prior to DC  - ID (Severiano), Ortho following

## 2021-12-29 NOTE — CHART NOTE - NSCHARTNOTEFT_GEN_A_CORE
Assessment: patient seen for follow up.    81y old  Male who presents with a chief complaint of increasing left leg pain status post IR drainage abscess femur  patient reports with good PO intake no complaints  12/23 BM ? will follow consider bowel regiment        Factors impacting intake: [x ] none [ ] nausea  [ ] vomiting [ ] diarrhea [ ] constipation  [ ]chewing problems [ ] swallowing issues  [ ] other:     Diet Presciption: Diet, Consistent Carbohydrate w/Evening Snack:   DASH/TLC {Sodium & Cholesterol Restricted} (12-18-21 @ 17:43)    Intake:   % in flow sheet  Current Weight: 12/29 wt 213.8# no edema   % Weight Change    Pertinent Medications: MEDICATIONS  (STANDING):  apixaban 5 milliGRAM(s) Oral two times a day  aspirin enteric coated 81 milliGRAM(s) Oral daily  atorvastatin 80 milliGRAM(s) Oral at bedtime  azithromycin   Tablet 250 milliGRAM(s) Oral <User Schedule>  budesonide 160 MICROgram(s)/formoterol 4.5 MICROgram(s) Inhaler 2 Puff(s) Inhalation two times a day  cefepime   IVPB 2000 milliGRAM(s) IV Intermittent every 12 hours  clopidogrel Tablet 75 milliGRAM(s) Oral daily  dextrose 40% Gel 15 Gram(s) Oral once  dextrose 5%. 1000 milliLiter(s) (50 mL/Hr) IV Continuous <Continuous>  dextrose 5%. 1000 milliLiter(s) (100 mL/Hr) IV Continuous <Continuous>  dextrose 5%. 1000 milliLiter(s) (100 mL/Hr) IV Continuous <Continuous>  dextrose 50% Injectable 25 Gram(s) IV Push once  dextrose 50% Injectable 12.5 Gram(s) IV Push once  dextrose 50% Injectable 25 Gram(s) IV Push once  glucagon  Injectable 1 milliGRAM(s) IntraMuscular once  glucagon  Injectable 1 milliGRAM(s) IntraMuscular once  insulin glargine Injectable (LANTUS) 10 Unit(s) SubCutaneous at bedtime  insulin lispro (ADMELOG) corrective regimen sliding scale   SubCutaneous three times a day before meals  insulin lispro Injectable (ADMELOG) 4 Unit(s) SubCutaneous before breakfast  insulin lispro Injectable (ADMELOG) 4 Unit(s) SubCutaneous before lunch  insulin lispro Injectable (ADMELOG) 4 Unit(s) SubCutaneous before dinner  metoprolol tartrate 50 milliGRAM(s) Oral two times a day  predniSONE   Tablet 2 milliGRAM(s) Oral daily  tamsulosin 0.4 milliGRAM(s) Oral at bedtime  tiotropium 18 MICROgram(s) Capsule 1 Capsule(s) Inhalation daily  vancomycin  IVPB 1500 milliGRAM(s) IV Intermittent every 24 hours    MEDICATIONS  (PRN):  acetaminophen     Tablet .. 650 milliGRAM(s) Oral every 6 hours PRN Mild Pain (1 - 3), Moderate Pain (4 - 6)  melatonin 5 milliGRAM(s) Oral at bedtime PRN Insomnia    Pertinent Labs: POCT 224, 169  Skin: no pressure injury     Estimated Needs:   [x ] no change since previous assessment  [ ] recalculated:     Previous Nutrition Diagnosis:   [ ] Inadequate Energy Intake [ ]Inadequate Oral Intake [ ] Excessive Energy Intake   [ ] Underweight [ ] Increased Nutrient Needs [ ] Overweight/Obesity   [ ] Altered GI Function [ ] Unintended Weight Loss [ ] Food & Nutrition Related Knowledge Deficit [ ] Malnutrition   (X) altered nutrition related labs  Nutrition Diagnosis is [x ] ongoing trend blood sugars [ ] resolved [ ] not applicable     New Nutrition Diagnosis: [x ] not applicable       Interventions:   Recommend  [ ] Change Diet To:  [ ] Nutrition Supplement  [ ] Nutrition Support  [ x] Other: recommend MVI , follow weights, recommend bowel regiment as necessary, follow blood sugars 100-180 in hospital setting    Monitoring and Evaluation:   [x ] PO intake [ x ] Tolerance to diet prescription [ x ] weights [ x ] labs[ x ] follow up per protocol  [ ] other:

## 2021-12-29 NOTE — PROGRESS NOTE ADULT - PROBLEM SELECTOR PROBLEM 4
History of COPD
Type 2 diabetes mellitus
History of COPD
Type 2 diabetes mellitus
History of COPD
History of COPD
Type 2 diabetes mellitus
Type 2 diabetes mellitus
History of COPD
History of COPD

## 2021-12-29 NOTE — PROGRESS NOTE ADULT - ASSESSMENT
The patient is an 82yo male with pmhx CAD s/p 3v CABG 2004, stents in 2019, HLD, HTN, PAT(on Eliquis), PVD(s/p stents in b/l legs -- right leg in 2020), osteomyelitis, COPD on prn home o2 and nocturnal bipap, type 2 Dm  presents to ED with leg pain x 1 week , found to have CT findings c/w abscess in left femur, POD #2 IR drainage.

## 2021-12-29 NOTE — PROGRESS NOTE ADULT - PROBLEM SELECTOR PROBLEM 8
History of PAT (paroxysmal atrial tachycardia)
History of PAT (paroxysmal atrial tachycardia)
CAD (coronary artery disease)
History of PAT (paroxysmal atrial tachycardia)
CAD (coronary artery disease)
History of PAT (paroxysmal atrial tachycardia)
CAD (coronary artery disease)
History of PAT (paroxysmal atrial tachycardia)
CAD (coronary artery disease)
History of PAT (paroxysmal atrial tachycardia)

## 2021-12-29 NOTE — PROGRESS NOTE ADULT - PROBLEM SELECTOR PLAN 7
- chronic  - pt on rosuvastatin at home; therapeutic interchange with Lipitor
- pt with remote hx of PAT vs PAF; diagnosed in [facility] within the last year  - c/w home metoprolol  - on home Eliquis 5mg BID; last dose AM of 12/17. Hold for possible OR intervention of intramedullary abscess   - EKG: SR with premature supraventricular complexes
- chronic  - pt on rosuvastatin at home; therapeutic interchange with Lipitor
- chronic  - pt on rosuvastatin at home; therapeutic interchange with Lipitor
- pt with remote hx of PAT vs PAF; diagnosed in [facility] within the last year  - c/w home metoprolol  - on home Eliquis 5mg BID; last dose AM of 12/17. Hold for possible OR intervention of intramedullary abscess   - EKG: SR with premature supraventricular complexes
- pt with remote hx of PAT vs PAF; diagnosed in [facility] within the last year  - c/w home metoprolol  - on home Eliquis 5mg BID; last dose AM of 12/17. Hold for possible OR intervention of intramedullary abscess   - EKG: SR with premature supraventricular complexes
- chronic  - pt on rosuvastatin at home; therapeutic interchange with Lipitor
- pt with remote hx of PAT vs PAF; diagnosed in [facility] within the last year  - c/w home metoprolol  - on home Eliquis 5mg BID; last dose AM of 12/17. Hold for possible OR intervention of intramedullary abscess   - EKG: SR with premature supraventricular complexes
- chronic  - pt on rosuvastatin at home; therapeutic interchange with Lipitor

## 2021-12-29 NOTE — PROGRESS NOTE ADULT - ASSESSMENT
COMMODORE TURNER 81 m 2021 [facility] P [phone]  1940  VERNON ESPINOZA    REVIEW OF SYMPTOMS      Able to give ROS  Yes     RELIABLE +/-   CONSTITUTIONAL Weakness Yes  Chills No   ENDOCRINE  No heat or cold intolerance    ALLERGY No hives  Sore throat No stridor  RESP Coughing blood no  Shortness of breath YES   NEURO No Headache  Confusion Pain neck No   CARDIAC No Chest pain No Palpitations   GI  Pain abdomen NO   Vomiting NO     PHYSICAL EXAM    HEENT Unremarkable  atraumatic   RESP Fair air entry EXP prolonged    Harsh breath sound Resp distres mild   CARDIAC S1 S2 No S3     NO JVD    ABDOMEN SOFT BS PRESENT NOT DISTENDED No hepatosplenomegaly PEDAL EDEMA present No calf tenderness  NO rash       ________________  HOSPITAL COURSE.   OM Femur   Cefepime 2.2    Vanco 1.5/d   IR drainage l femur abscess aubrey drain   COPD on home O2  PREDNISONE Decreased 2 2021  NOCT BPAP   RESP FAILURE    PMH   COPD ASTHMA Former smoker 2019 FVC 2.20 58% Post 2.61 69% +18 FEV1   0.86 31% Post 0.89 32% +4%  FEV1% 39%     INTUBATION CAC 2018   CAD sp cabg pmh  PVD stents pmh   l FEM FX YR AGO With local episodic infectn    _____                            GOC. 2021 Full code   COVID STATUS.   ICU STAY. none  ABIO.    AZITHRO q MWF    Cefepime 2.2    Vanco 1.5/d          BEST PRACTICE ISSUES.                                                  HEAD OF BED ELEVATION. Yes  DVT PROPHYLAXIS.    2021 apixaba 5.2 (a f)                                      MCCORMICK PROPHYLAXIS.                                                                                         DIET.     cons carb                       INFECTION PROPHYLAXIS.    SPEECH SWALLOW RECOMMENDATIONS.      ASSESSMENT/RECOMMENDATIONS.    RESP.   is on noct bpap    HEMODYNAMICS.   bp ok    VANCO t   --2021 VT 11.7-13 - 15     INFECTION.   IR drainage l femur abscess aubrey drain  AZITHRO q MWF    Cefepime 2.2    Vanco 1.5/d   2021 ID plans to change to rocephin till     COPD   symbicort 160    pred 4 -> 2021 pred 2   2021 change dw spouse    symbicor   under control    CAD.   metoprolol 50.2    asa 81   2021 plavx     CHF   lasix 40     ANEMIA  Hb 2021 Hb 11   Monitor     CKD  Cr --2021 Cr 1.4- 1.5-1.3  Monitor       TIME SPENT   Over 25 minutes aggregate care time spent on encounter; activities included   direct patient care, counseling and/or coordinating care reviewing notes, lab data/ imaging , discussion with multidisciplinary team/ patient  /family and explaining in detail risks, benefits, alternatives  of the recommendations     COMMODORE TURNER 81 m 2021 [facility] P [phone]  1940  VERNON ESPINOZA

## 2021-12-29 NOTE — PROGRESS NOTE ADULT - PROBLEM SELECTOR PLAN 11
- DVT Prophylaxis: SCD's; home Eliquis on hold for possible OR intervention
- c/w home flomax.
- DVT Prophylaxis: SCD's; home Eliquis on hold for possible OR intervention
- c/w home flomax.
- DVT Prophylaxis: SCD's; home Eliquis on hold for possible OR intervention
- DVT Prophylaxis: SCD's; home Eliquis on hold for possible OR intervention
- c/w home Flomax.
- c/w home Flomax.

## 2021-12-29 NOTE — PROGRESS NOTE ADULT - ASSESSMENT
80yo male with pmhx DM2, CAD s/p 3v CABG 2004, stents in 2019, HLD, HTN, PAT(on Eliquis)?, PVD(s/p stents in b/l legs -- right leg in 2020), osteomyelitis, COPD on prn home o2 and nocturnal bipap, type 2 Dm who presented to ED with increasing left leg pain x 1 week.  Now found to have w/ osteomyelitis, concern for intramedullary abscess    TTE 8/3/21 w/ hypokinesis mild DD   EKG, NSR no ischemic changes noted     - s/p IR drain/placement Left Femur, tolerated well and stable from CV POV, awaiting for path, Abx per primary team  - Continue Plavix, ASA, Eliquis, statin    - BP controlled and stable.    - continue BB with hold parameters    - No evidence of volume overload or ischemic symptoms    - Monitor and replete Lytes. Keep K > 4 and Mg > 2  - Will continue to follow.    [facility]  Nurse Practitioner - Cardiology   Spectra #[id number]/ [phone]

## 2021-12-29 NOTE — PROGRESS NOTE ADULT - PROBLEM SELECTOR PROBLEM 10
History of BPH
PVD (peripheral vascular disease)
History of BPH
PVD (peripheral vascular disease)
History of BPH
PVD (peripheral vascular disease)
History of BPH
PVD (peripheral vascular disease)

## 2021-12-29 NOTE — PROGRESS NOTE ADULT - PROBLEM SELECTOR PLAN 8
- pt with remote hx of PAT vs PAF; diagnosed in [facility] within the last year  - c/w home metoprolol  - on home Eliquis 5mg BID, restarted today s/p IR drainage

## 2021-12-29 NOTE — PROGRESS NOTE ADULT - PROBLEM SELECTOR PROBLEM 11
Need for prophylactic measure
History of BPH
Need for prophylactic measure
Need for prophylactic measure
History of BPH
History of BPH
Need for prophylactic measure
History of BPH

## 2021-12-29 NOTE — PROGRESS NOTE ADULT - PROBLEM SELECTOR PROBLEM 5
Type 2 diabetes mellitus
Hypertension
Type 2 diabetes mellitus
Hypertension
Type 2 diabetes mellitus
Hypertension
Type 2 diabetes mellitus
Hypertension
Type 2 diabetes mellitus

## 2021-12-29 NOTE — PROGRESS NOTE ADULT - PROBLEM SELECTOR PLAN 12
- DVT Prophylaxis: SCD's for now.    - Plavix and Eliquis ordered to re-start tomorrow as per IR recs after procedure - Restarted eliquis on 12/29 s/p IR drainage

## 2021-12-29 NOTE — PROCEDURE NOTE - PLAN
Drainage catheter connected to MELANIA bulb for suction drainage.  Can flush drain with 10cc normal saline twice daily to maintain patency.  Monitor in hospital for at least 24-48 hours to evaluate for signs and symptoms of worsening sepsis.  Can resume Plavix and Eliquis in 48 hours.
can use picc

## 2021-12-29 NOTE — PROGRESS NOTE ADULT - PROBLEM SELECTOR PROBLEM 2
Chronic osteomyelitis
MERRILL (acute kidney injury)
Chronic osteomyelitis
MERRILL (acute kidney injury)
MERRILL (acute kidney injury)
Chronic osteomyelitis
MERRILL (acute kidney injury)
Chronic osteomyelitis

## 2021-12-29 NOTE — PROGRESS NOTE ADULT - PROBLEM SELECTOR PLAN 9
- CAD s/p 3v CABG 2004, stents in 2019  - continue beta blocker   - continue asa  - Plavix restarted today s/p IR drainage

## 2021-12-29 NOTE — PROCEDURE NOTE - PROCEDURE FINDINGS AND DETAILS
46cm bard power picc inserted into patent right basilic vein under sterile conditions and ultrasound and fluoroscopic guidance.
Left femur/perifemur multiloculated collection reidentified on CT.  Patient sedated by anesthesiology service.  Dominant collection was drained with 8.5 Macedonian drainage catheter, total 65cc obtained purulent and bloody. Second pocket anterior to the femur (caudal in location) was aspirated with 5French catheter, total 15cc obtained also purulent and bloody. Total 80cc drained.  Drainage catheter connected to MELANIA bulb for suction drainage.  Can flush drain with 10cc normal saline twice daily to maintain patency.  Monitor in hospital for at least 24-48 hours to evaluate for signs and symptoms of worsening sepsis.  Can resume Plavix and Eliquis in 48 hours.

## 2021-12-29 NOTE — PROCEDURE NOTE - NSICDXPROCEDURE_GEN_ALL_CORE_FT
PROCEDURES:  Insertion of peripherally inserted central catheter (PICC) with imaging guidance 29-Dec-2021 16:26:21  Scott Franco

## 2021-12-29 NOTE — PROGRESS NOTE ADULT - PROBLEM SELECTOR PLAN 6
- chronic  - pt on rosuvastatin at home; therapeutic interchange with Lipitor
- chronic, stable  - hold home losartan   - BP has been normal  - c/w home metoprolol  - monitor routine hemodynamics
- chronic, stable  - hold home losartan   - BP has been normal  - c/w home metoprolol
- chronic, stable  - hold home losartan   - BP has been normal to low.   - c/w home metoprolol with hold parameters.
- chronic, stable  - hold home losartan   - BP has been normal to low.   - c/w home metoprolol with hold parameters.
- chronic  - pt on rosuvastatin at home; therapeutic interchange with Lipitor
- chronic, stable  - hold home losartan   - BP has been normal  - c/w home metoprolol
- chronic  - pt on rosuvastatin at home; therapeutic interchange with Lipitor
- chronic, stable  - hold home losartan   - BP has been normal to low.   - c/w home metoprolol
- chronic, stable  - hold home losartan   - BP has been normal to low.   - c/w home metoprolol with hold parameters.
- chronic  - pt on rosuvastatin at home; therapeutic interchange with Lipitor
- chronic, stable  - hold home losartan   - BP has been normal  - c/w home metoprolol  - monitor routine hemodynamics

## 2021-12-29 NOTE — PROGRESS NOTE ADULT - PROBLEM SELECTOR PLAN 2
- Pt with left leg pain exacerbated for 1 week.  - h/o femur fracture many years ago with episodes of pain and infection in the old fracture area, had OM with a long term antibiotic treatment.  - MRI w/w/o IV cont: Findings concerning for adjacent chronic osteo.  - ESR 60 and CRP 81 (12/19) downtrending, today ESR 23 and CRP 11  - Depends on culture and pathology after IR intervention will decide if long term IV abx treatment is needed.
- MERRILL on CKD; Cr on presentation 1.60. Baseline Cr 1.20  - Improving today to 1.4  - Will continue fluids  - hold home losartan  - hold all nephrotoxic agents  - trend CMP
- Pt with left leg pain exacerbated for 1 week.  - h/o femur fracture many years ago with episodes of pain and infection in the old fracture area, had OM with a long term antibiotic treatment.  - MRI w/w/o IV cont: Findings concerning for adjacent chronic osteo.  - ESR 60 and CRP 81 (12/19) downtrending, today ESR 23 and CRP 11  - ID following
- Pt with left leg pain exacerbated for 1 week.  -  h/o femur fracture many years ago with episodes of pain and infection in the old fracture area, had OM with a long term antibiotic treatment.  - MRI w/w/o IV cont: Findings concerning for adjacent chronic osteo.  - ESR 60 and CRP 70   - Depends on culture and pathology after IR intervention more likely will need PICC line for long term IV abx treatment.
- MERRILL on CKD; Cr on presentation 1.60. Baseline Cr 1.20  - Cr 1.40 on AM labs today, stable   - Will continue fluids  - hold home losartan  - hold all nephrotoxic agents  - trend CMP
- MERRILL on CKD; Cr on presentation 1.60. Baseline Cr 1.20  - Will start IVF  - hold home losartan  - hold all nephrotoxic agents  - tend CMP
- Pt with left leg pain exacerbated for 1 week.  - h/o femur fracture many years ago with episodes of pain and infection in the old fracture area, had OM with a long term antibiotic treatment.  - MRI w/w/o IV cont: Findings concerning for adjacent chronic osteo.  - ESR 60 and CRP 81 (12/19) downtrending, today ESR 25 and CRP 9  - Depends on culture and pathology after IR intervention more likely will need PICC line for long term IV abx treatment.
- Pt with left leg pain exacerbated for 1 week.  -  h/o femur fracture many years ago with episodes of pain and infection in the old fracture area, had OM with a long term antibiotic treatment.  - MRI w/w/o IV cont: Findings concerning for adjacent chronic osteo.  - Elevated ESR 60 and CRP 81 (12/19)   - f/u ESR and CRP control   - Depends on culture and pathology after IR intervention more likely will need PICC line for long term IV abx treatment.
- MERRILL on CKD; Cr on presentation 1.60. Baseline Cr 1.20  - Cr 1.40 on AM labs today, stable   - Will continue fluids  - hold home losartan  - hold all nephrotoxic agents  - trend CMP
- Pt with left leg pain exacerbated for 1 week.  -  h/o femur fracture many years ago with episodes of pain and infection in the old fracture area, had OM with a long term antibiotic treatment.  - MRI w/w/o IV cont: Findings concerning for adjacent chronic osteo.  - ESR 60 and CRP 70   - Depends on culture and pathology after IR intervention more likely will need PICC line for long term IV abx treatment.

## 2021-12-30 ENCOUNTER — TRANSCRIPTION ENCOUNTER (OUTPATIENT)
Age: 81
End: 2021-12-30

## 2021-12-30 VITALS
TEMPERATURE: 98 F | SYSTOLIC BLOOD PRESSURE: 112 MMHG | DIASTOLIC BLOOD PRESSURE: 70 MMHG | OXYGEN SATURATION: 93 % | HEART RATE: 76 BPM | RESPIRATION RATE: 18 BRPM

## 2021-12-30 LAB
ANION GAP SERPL CALC-SCNC: 7 MMOL/L — SIGNIFICANT CHANGE UP (ref 5–17)
BASOPHILS # BLD AUTO: 0.03 K/UL — SIGNIFICANT CHANGE UP (ref 0–0.2)
BASOPHILS NFR BLD AUTO: 0.5 % — SIGNIFICANT CHANGE UP (ref 0–2)
BUN SERPL-MCNC: 22 MG/DL — SIGNIFICANT CHANGE UP (ref 7–23)
CALCIUM SERPL-MCNC: 9.5 MG/DL — SIGNIFICANT CHANGE UP (ref 8.5–10.1)
CHLORIDE SERPL-SCNC: 107 MMOL/L — SIGNIFICANT CHANGE UP (ref 96–108)
CO2 SERPL-SCNC: 28 MMOL/L — SIGNIFICANT CHANGE UP (ref 22–31)
CREAT SERPL-MCNC: 1.1 MG/DL — SIGNIFICANT CHANGE UP (ref 0.5–1.3)
EOSINOPHIL # BLD AUTO: 0.03 K/UL — SIGNIFICANT CHANGE UP (ref 0–0.5)
EOSINOPHIL NFR BLD AUTO: 0.5 % — SIGNIFICANT CHANGE UP (ref 0–6)
GLUCOSE SERPL-MCNC: 170 MG/DL — HIGH (ref 70–99)
HCT VFR BLD CALC: 35.8 % — LOW (ref 39–50)
HGB BLD-MCNC: 11.3 G/DL — LOW (ref 13–17)
IMM GRANULOCYTES NFR BLD AUTO: 0.9 % — SIGNIFICANT CHANGE UP (ref 0–1.5)
LYMPHOCYTES # BLD AUTO: 1.17 K/UL — SIGNIFICANT CHANGE UP (ref 1–3.3)
LYMPHOCYTES # BLD AUTO: 21.3 % — SIGNIFICANT CHANGE UP (ref 13–44)
MCHC RBC-ENTMCNC: 27 PG — SIGNIFICANT CHANGE UP (ref 27–34)
MCHC RBC-ENTMCNC: 31.6 GM/DL — LOW (ref 32–36)
MCV RBC AUTO: 85.6 FL — SIGNIFICANT CHANGE UP (ref 80–100)
MONOCYTES # BLD AUTO: 0.77 K/UL — SIGNIFICANT CHANGE UP (ref 0–0.9)
MONOCYTES NFR BLD AUTO: 14 % — SIGNIFICANT CHANGE UP (ref 2–14)
NEUTROPHILS # BLD AUTO: 3.45 K/UL — SIGNIFICANT CHANGE UP (ref 1.8–7.4)
NEUTROPHILS NFR BLD AUTO: 62.8 % — SIGNIFICANT CHANGE UP (ref 43–77)
NRBC # BLD: 0 /100 WBCS — SIGNIFICANT CHANGE UP (ref 0–0)
PLATELET # BLD AUTO: 311 K/UL — SIGNIFICANT CHANGE UP (ref 150–400)
POTASSIUM SERPL-MCNC: 3.9 MMOL/L — SIGNIFICANT CHANGE UP (ref 3.5–5.3)
POTASSIUM SERPL-SCNC: 3.9 MMOL/L — SIGNIFICANT CHANGE UP (ref 3.5–5.3)
RBC # BLD: 4.18 M/UL — LOW (ref 4.2–5.8)
RBC # FLD: 13.6 % — SIGNIFICANT CHANGE UP (ref 10.3–14.5)
SARS-COV-2 RNA SPEC QL NAA+PROBE: SIGNIFICANT CHANGE UP
SODIUM SERPL-SCNC: 142 MMOL/L — SIGNIFICANT CHANGE UP (ref 135–145)
VANCOMYCIN TROUGH SERPL-MCNC: 14.5 UG/ML — SIGNIFICANT CHANGE UP (ref 10–20)
WBC # BLD: 5.5 K/UL — SIGNIFICANT CHANGE UP (ref 3.8–10.5)
WBC # FLD AUTO: 5.5 K/UL — SIGNIFICANT CHANGE UP (ref 3.8–10.5)

## 2021-12-30 PROCEDURE — 76937 US GUIDE VASCULAR ACCESS: CPT

## 2021-12-30 PROCEDURE — 73720 MRI LWR EXTREMITY W/O&W/DYE: CPT

## 2021-12-30 PROCEDURE — 80053 COMPREHEN METABOLIC PANEL: CPT

## 2021-12-30 PROCEDURE — 94660 CPAP INITIATION&MGMT: CPT

## 2021-12-30 PROCEDURE — U0005: CPT

## 2021-12-30 PROCEDURE — A9579: CPT

## 2021-12-30 PROCEDURE — 86140 C-REACTIVE PROTEIN: CPT

## 2021-12-30 PROCEDURE — 87635 SARS-COV-2 COVID-19 AMP PRB: CPT

## 2021-12-30 PROCEDURE — C1729: CPT

## 2021-12-30 PROCEDURE — 96374 THER/PROPH/DIAG INJ IV PUSH: CPT

## 2021-12-30 PROCEDURE — 87070 CULTURE OTHR SPECIMN AEROBIC: CPT

## 2021-12-30 PROCEDURE — 87040 BLOOD CULTURE FOR BACTERIA: CPT

## 2021-12-30 PROCEDURE — 97116 GAIT TRAINING THERAPY: CPT

## 2021-12-30 PROCEDURE — 77001 FLUOROGUIDE FOR VEIN DEVICE: CPT

## 2021-12-30 PROCEDURE — 99285 EMERGENCY DEPT VISIT HI MDM: CPT | Mod: 25

## 2021-12-30 PROCEDURE — 83605 ASSAY OF LACTIC ACID: CPT

## 2021-12-30 PROCEDURE — 85610 PROTHROMBIN TIME: CPT

## 2021-12-30 PROCEDURE — 83036 HEMOGLOBIN GLYCOSYLATED A1C: CPT

## 2021-12-30 PROCEDURE — C1751: CPT

## 2021-12-30 PROCEDURE — 73700 CT LOWER EXTREMITY W/O DYE: CPT | Mod: MA

## 2021-12-30 PROCEDURE — 85652 RBC SED RATE AUTOMATED: CPT

## 2021-12-30 PROCEDURE — 71045 X-RAY EXAM CHEST 1 VIEW: CPT

## 2021-12-30 PROCEDURE — 84100 ASSAY OF PHOSPHORUS: CPT

## 2021-12-30 PROCEDURE — 93306 TTE W/DOPPLER COMPLETE: CPT

## 2021-12-30 PROCEDURE — 80202 ASSAY OF VANCOMYCIN: CPT

## 2021-12-30 PROCEDURE — 85730 THROMBOPLASTIN TIME PARTIAL: CPT

## 2021-12-30 PROCEDURE — 87205 SMEAR GRAM STAIN: CPT

## 2021-12-30 PROCEDURE — 85025 COMPLETE CBC W/AUTO DIFF WBC: CPT

## 2021-12-30 PROCEDURE — 36415 COLL VENOUS BLD VENIPUNCTURE: CPT

## 2021-12-30 PROCEDURE — 77012 CT SCAN FOR NEEDLE BIOPSY: CPT

## 2021-12-30 PROCEDURE — 94760 N-INVAS EAR/PLS OXIMETRY 1: CPT

## 2021-12-30 PROCEDURE — U0003: CPT

## 2021-12-30 PROCEDURE — 93005 ELECTROCARDIOGRAM TRACING: CPT

## 2021-12-30 PROCEDURE — 10160 PNXR ASPIR ABSC HMTMA BULLA: CPT

## 2021-12-30 PROCEDURE — 99232 SBSQ HOSP IP/OBS MODERATE 35: CPT

## 2021-12-30 PROCEDURE — 99239 HOSP IP/OBS DSCHRG MGMT >30: CPT | Mod: GC

## 2021-12-30 PROCEDURE — 94640 AIRWAY INHALATION TREATMENT: CPT

## 2021-12-30 PROCEDURE — 97161 PT EVAL LOW COMPLEX 20 MIN: CPT

## 2021-12-30 PROCEDURE — C1769: CPT

## 2021-12-30 PROCEDURE — 82962 GLUCOSE BLOOD TEST: CPT

## 2021-12-30 PROCEDURE — 99233 SBSQ HOSP IP/OBS HIGH 50: CPT

## 2021-12-30 PROCEDURE — 73552 X-RAY EXAM OF FEMUR 2/>: CPT

## 2021-12-30 PROCEDURE — 36573 INSJ PICC RS&I 5 YR+: CPT

## 2021-12-30 PROCEDURE — 80048 BASIC METABOLIC PNL TOTAL CA: CPT

## 2021-12-30 PROCEDURE — 93971 EXTREMITY STUDY: CPT

## 2021-12-30 PROCEDURE — 85027 COMPLETE CBC AUTOMATED: CPT

## 2021-12-30 PROCEDURE — 83735 ASSAY OF MAGNESIUM: CPT

## 2021-12-30 RX ORDER — METFORMIN HYDROCHLORIDE 850 MG/1
1 TABLET ORAL
Qty: 0 | Refills: 0 | DISCHARGE

## 2021-12-30 RX ORDER — ASPIRIN/CALCIUM CARB/MAGNESIUM 324 MG
1 TABLET ORAL
Qty: 0 | Refills: 0 | DISCHARGE
Start: 2021-12-30

## 2021-12-30 RX ORDER — CEFTRIAXONE 500 MG/1
2 INJECTION, POWDER, FOR SOLUTION INTRAMUSCULAR; INTRAVENOUS
Qty: 0 | Refills: 0 | DISCHARGE
Start: 2021-12-30

## 2021-12-30 RX ADMIN — Medication 2: at 08:23

## 2021-12-30 RX ADMIN — Medication 650 MILLIGRAM(S): at 07:00

## 2021-12-30 RX ADMIN — Medication 2: at 12:13

## 2021-12-30 RX ADMIN — Medication 4 UNIT(S): at 12:14

## 2021-12-30 RX ADMIN — CEFTRIAXONE 100 MILLIGRAM(S): 500 INJECTION, POWDER, FOR SOLUTION INTRAMUSCULAR; INTRAVENOUS at 12:13

## 2021-12-30 RX ADMIN — Medication 650 MILLIGRAM(S): at 05:36

## 2021-12-30 RX ADMIN — CLOPIDOGREL BISULFATE 75 MILLIGRAM(S): 75 TABLET, FILM COATED ORAL at 12:13

## 2021-12-30 RX ADMIN — TIOTROPIUM BROMIDE 1 CAPSULE(S): 18 CAPSULE ORAL; RESPIRATORY (INHALATION) at 08:24

## 2021-12-30 RX ADMIN — Medication 50 MILLIGRAM(S): at 05:36

## 2021-12-30 RX ADMIN — BUDESONIDE AND FORMOTEROL FUMARATE DIHYDRATE 2 PUFF(S): 160; 4.5 AEROSOL RESPIRATORY (INHALATION) at 08:29

## 2021-12-30 RX ADMIN — APIXABAN 5 MILLIGRAM(S): 2.5 TABLET, FILM COATED ORAL at 05:36

## 2021-12-30 RX ADMIN — Medication 4 UNIT(S): at 08:22

## 2021-12-30 RX ADMIN — Medication 81 MILLIGRAM(S): at 12:13

## 2021-12-30 RX ADMIN — Medication 2 MILLIGRAM(S): at 05:36

## 2021-12-30 NOTE — PROGRESS NOTE ADULT - SUBJECTIVE AND OBJECTIVE BOX
YUE     PLV 1EAS 107 W1    Allergies    No Known Allergies    Intolerances    shellfish (Nausea)      PAST MEDICAL & SURGICAL HISTORY:  Diabetes Mellitus, Type II    CAD (Coronary Artery Disease)  s/p 3v CABG ; stents placed [facility] in 2019    Dyslipidemia    Osteomyelitis    COPD (chronic obstructive pulmonary disease)  on 2L at home and BiPAP at night; intubated     Hypertension    PVD (peripheral vascular disease)    History of PAT (paroxysmal atrial tachycardia)    CABG (Coronary Artery Bypass Graft)      Compound fracture  left leg    S/P primary angioplasty with coronary stent        FAMILY HISTORY:  Family history of diabetes mellitus (Sibling)    Family hx of lung cancer  brother,  age 82, used to smoke with pt        Home Medications:  azithromycin 250 mg oral tablet: 1 tab(s) orally Monday, Wednesday, and Friday (18 Dec 2021 01:56)  Breo Ellipta 200 mcg-25 mcg/inh inhalation powder: 1 puff(s) inhaled once a day (18 Dec 2021 01:56)  Incruse Ellipta 62.5 mcg/inh inhalation powder: 1 puff(s) inhaled every 24 hours (18 Dec 2021 01:56)  Jardiance 25 mg oral tablet: 1 tab(s) orally once a day (in the morning) (18 Dec 2021 01:56)  losartan 25 mg oral tablet: 1 tab(s) orally once a day (18 Dec 2021 01:56)  metFORMIN 1000 mg oral tablet: 1 tab(s) orally 2 times a day w/food  please resume the dose on 08/10/2021  (18 Dec 2021 01:56)  NovoLOG FlexPen 100 units/mL injectable solution: Inject 5 units at BS over 200, 10 units at BS over 300, and 15 units at BS over 400 (18 Dec 2021 01:56)  Nucala 100 mg subcutaneous injection: 100 milligram(s) subcutaneous every 4 weeks    *Last given 21* (18 Dec 2021 01:56)  predniSONE 2 mg oral delayed release tablet: 2 tab(s) orally once a day (18 Dec 2021 01:56)  rosuvastatin 20 mg oral tablet: 1 tab(s) orally once a day (at bedtime) (18 Dec 2021 01:56)  tamsulosin 0.4 mg oral capsule: 1 cap(s) orally once a day (at bedtime) (18 Dec 2021 01:56)      MEDICATIONS  (STANDING):  apixaban 5 milliGRAM(s) Oral two times a day  aspirin enteric coated 81 milliGRAM(s) Oral daily  atorvastatin 80 milliGRAM(s) Oral at bedtime  azithromycin   Tablet 250 milliGRAM(s) Oral <User Schedule>  budesonide 160 MICROgram(s)/formoterol 4.5 MICROgram(s) Inhaler 2 Puff(s) Inhalation two times a day  cefTRIAXone   IVPB 2000 milliGRAM(s) IV Intermittent every 24 hours  chlorhexidine 4% Liquid 1 Application(s) Topical <User Schedule>  clopidogrel Tablet 75 milliGRAM(s) Oral daily  dextrose 40% Gel 15 Gram(s) Oral once  dextrose 5%. 1000 milliLiter(s) (50 mL/Hr) IV Continuous <Continuous>  dextrose 5%. 1000 milliLiter(s) (100 mL/Hr) IV Continuous <Continuous>  dextrose 5%. 1000 milliLiter(s) (100 mL/Hr) IV Continuous <Continuous>  dextrose 50% Injectable 25 Gram(s) IV Push once  dextrose 50% Injectable 12.5 Gram(s) IV Push once  dextrose 50% Injectable 25 Gram(s) IV Push once  glucagon  Injectable 1 milliGRAM(s) IntraMuscular once  glucagon  Injectable 1 milliGRAM(s) IntraMuscular once  insulin glargine Injectable (LANTUS) 10 Unit(s) SubCutaneous at bedtime  insulin lispro (ADMELOG) corrective regimen sliding scale   SubCutaneous three times a day before meals  insulin lispro (ADMELOG) corrective regimen sliding scale   SubCutaneous at bedtime  insulin lispro Injectable (ADMELOG) 4 Unit(s) SubCutaneous before breakfast  insulin lispro Injectable (ADMELOG) 4 Unit(s) SubCutaneous before lunch  insulin lispro Injectable (ADMELOG) 4 Unit(s) SubCutaneous before dinner  metoprolol tartrate 50 milliGRAM(s) Oral two times a day  predniSONE   Tablet 2 milliGRAM(s) Oral daily  tamsulosin 0.4 milliGRAM(s) Oral at bedtime  tiotropium 18 MICROgram(s) Capsule 1 Capsule(s) Inhalation daily    MEDICATIONS  (PRN):  acetaminophen     Tablet .. 650 milliGRAM(s) Oral every 6 hours PRN Mild Pain (1 - 3), Moderate Pain (4 - 6)  melatonin 5 milliGRAM(s) Oral at bedtime PRN Insomnia  sodium chloride 0.9% lock flush 10 milliLiter(s) IV Push every 1 hour PRN Pre/post blood products, medications, blood draw, and to maintain line patency      Diet, Consistent Carbohydrate w/Evening Snack:   DASH/TLC Sodium & Cholesterol Restricted (21 @ 17:43) [Active]          Vital Signs Last 24 Hrs  T(C): 36.7 (30 Dec 2021 05:15), Max: 37.1 (29 Dec 2021 20:19)  T(F): 98.1 (30 Dec 2021 05:15), Max: 98.8 (29 Dec 2021 20:19)  HR: 78 (30 Dec 2021 05:15) (56 - 91)  BP: 121/76 (30 Dec 2021 05:15) (101/61 - 121/76)  BP(mean): --  RR: 18 (30 Dec 2021 05:15) (17 - 18)  SpO2: 94% (30 Dec 2021 05:15) (94% - 98%)      21 @ 07:01  -  21 @ 07:00  --------------------------------------------------------  IN: 0 mL / OUT: 650 mL / NET: -650 mL              LABS:                        11.4   5.69  )-----------( 315      ( 29 Dec 2021 08:42 )             37.6         141  |  106  |  23  ----------------------------<  154<H>  3.8   |  30  |  1.20    Ca    9.9      29 Dec 2021 08:42  Phos  2.5       Mg     2.2                     WBC:  WBC Count: 5.69 K/uL ( @ 08:42)  WBC Count: 5.29 K/uL ( @ 09:26)  WBC Count: 6.63 K/uL ( @ 08:22)  WBC Count: 6.23 K/uL ( @ 08:29)      MICROBIOLOGY:  RECENT CULTURES:   .Abscess Left femur/perifemur XXXX XXXX   No growth                    Sodium:  Sodium, Serum: 141 mmol/L ( @ 08:42)  Sodium, Serum: 141 mmol/L ( @ 09:26)  Sodium, Serum: 141 mmol/L ( @ 08:22)  Sodium, Serum: 141 mmol/L ( @ 08:29)      1.20 mg/dL  08:42  1.20 mg/dL  09:26  1.30 mg/dL  @ 08:22  1.30 mg/dL  @ 08:29      Hemoglobin:  Hemoglobin: 11.4 g/dL ( @ 08:42)  Hemoglobin: 11.5 g/dL ( @ 09:26)  Hemoglobin: 11.4 g/dL ( @ 08:22)  Hemoglobin: 10.9 g/dL ( @ 08:29)      Platelets: Platelet Count - Automated: 315 K/uL ( @ 08:42)  Platelet Count - Automated: 331 K/uL ( @ 09:26)  Platelet Count - Automated: 342 K/uL ( @ 08:22)  Platelet Count - Automated: 311 K/uL ( @ 08:29)              RADIOLOGY & ADDITIONAL STUDIES:      MICROBIOLOGY:  RECENT CULTURES:   .Abscess Left femur/perifemur XXXX XXXX   No growth

## 2021-12-30 NOTE — DISCHARGE NOTE NURSING/CASE MANAGEMENT/SOCIAL WORK - NSSCCARECORD_GEN_ALL_CORE
Ivan  utilized. 86 year old male came to the ED because a cough with sob and fever for the last 10 days. His wife is a patient here too with similar symptoms. No vomiting, no chest pain, no headache, no rash, no urinary complaints. [facility]

## 2021-12-30 NOTE — DISCHARGE NOTE NURSING/CASE MANAGEMENT/SOCIAL WORK - NSDCVIVACCINE_GEN_ALL_CORE_FT
influenza, injectable, quadrivalent, preservative free; 18-Oct-2019 13:33; Faiza Mclaughlin (RN); [employer]; [id number] (Exp. Date: 30-Jun-2020); IntraMuscular; Deltoid Left.; 0.5 milliLiter(s); VIS (VIS Published: 15-Aug-2019, VIS Presented: 18-Oct-2019);   influenza, injectable, quadrivalent, preservative free; 26-Sep-2020 15:14; Cory Crooks (RN); [employer]; [id number] (Exp. Date: 30-Jun-2021); IntraMuscular; Deltoid Left.; 0.5 milliLiter(s); VIS (VIS Published: 15-Aug-2019, VIS Presented: 26-Sep-2020);   influenza, injectable, quadrivalent, preservative free; 06-Sep-2021 18:22; Aaron Zavala (RACHEL); [employer]; [id number] (Exp. Date: 30-Jun-2022); IntraMuscular; Deltoid Left.; 0.5 milliLiter(s); VIS (VIS Published: 15-Aug-2019, VIS Presented: 06-Sep-2021);

## 2021-12-30 NOTE — PROGRESS NOTE ADULT - SUBJECTIVE AND OBJECTIVE BOX
[facility] -- Saud Aguilar Grossman, [place], Carroll Haney, Carolina Heath: Office # [phone]    Follow Up:  CAD s/p CABG/stents, Cardiac Optimization    Subjective/Observations: Patient seen and examined, awake, alert, resting comfortably in bed, denies chest pain, dyspnea, palpitations or dizziness, orthopnea and PND. Tolerating room air, .left thigh dressing with MELANIA drain x1 in place and intact     REVIEW OF SYSTEMS: All review of systems is negative for eye, ENT, GI, , allergic, dermatologic, musculoskeletal and neurologic except as described above    PAST MEDICAL & SURGICAL HISTORY:  Diabetes Mellitus, Type II    CAD (Coronary Artery Disease)  s/p 3v CABG 2004; stents placed in [place] in 2019    Dyslipidemia    Osteomyelitis    COPD (chronic obstructive pulmonary disease)  on 2L at home and BiPAP at night; intubated 6/18    Hypertension    PVD (peripheral vascular disease)    History of PAT (paroxysmal atrial tachycardia)    CABG (Coronary Artery Bypass Graft)  2004    Compound fracture  left leg    S/P primary angioplasty with coronary stent        MEDICATIONS  (STANDING):  apixaban 5 milliGRAM(s) Oral two times a day  aspirin enteric coated 81 milliGRAM(s) Oral daily  atorvastatin 80 milliGRAM(s) Oral at bedtime  azithromycin   Tablet 250 milliGRAM(s) Oral <User Schedule>  budesonide 160 MICROgram(s)/formoterol 4.5 MICROgram(s) Inhaler 2 Puff(s) Inhalation two times a day  cefTRIAXone   IVPB 2000 milliGRAM(s) IV Intermittent every 24 hours  chlorhexidine 4% Liquid 1 Application(s) Topical <User Schedule>  clopidogrel Tablet 75 milliGRAM(s) Oral daily  dextrose 40% Gel 15 Gram(s) Oral once  dextrose 5%. 1000 milliLiter(s) (50 mL/Hr) IV Continuous <Continuous>  dextrose 5%. 1000 milliLiter(s) (100 mL/Hr) IV Continuous <Continuous>  dextrose 5%. 1000 milliLiter(s) (100 mL/Hr) IV Continuous <Continuous>  dextrose 50% Injectable 25 Gram(s) IV Push once  dextrose 50% Injectable 12.5 Gram(s) IV Push once  dextrose 50% Injectable 25 Gram(s) IV Push once  glucagon  Injectable 1 milliGRAM(s) IntraMuscular once  glucagon  Injectable 1 milliGRAM(s) IntraMuscular once  insulin glargine Injectable (LANTUS) 10 Unit(s) SubCutaneous at bedtime  insulin lispro (ADMELOG) corrective regimen sliding scale   SubCutaneous three times a day before meals  insulin lispro (ADMELOG) corrective regimen sliding scale   SubCutaneous at bedtime  insulin lispro Injectable (ADMELOG) 4 Unit(s) SubCutaneous before breakfast  insulin lispro Injectable (ADMELOG) 4 Unit(s) SubCutaneous before lunch  insulin lispro Injectable (ADMELOG) 4 Unit(s) SubCutaneous before dinner  metoprolol tartrate 50 milliGRAM(s) Oral two times a day  predniSONE   Tablet 2 milliGRAM(s) Oral daily  tamsulosin 0.4 milliGRAM(s) Oral at bedtime  tiotropium 18 MICROgram(s) Capsule 1 Capsule(s) Inhalation daily    MEDICATIONS  (PRN):  acetaminophen     Tablet .. 650 milliGRAM(s) Oral every 6 hours PRN Mild Pain (1 - 3), Moderate Pain (4 - 6)  melatonin 5 milliGRAM(s) Oral at bedtime PRN Insomnia  sodium chloride 0.9% lock flush 10 milliLiter(s) IV Push every 1 hour PRN Pre/post blood products, medications, blood draw, and to maintain line patency    Allergies    No Known Allergies    Intolerances    shellfish (Nausea)    Vital Signs Last 24 Hrs  T(C): 36.7 (30 Dec 2021 05:15), Max: 37.1 (29 Dec 2021 20:19)  T(F): 98.1 (30 Dec 2021 05:15), Max: 98.8 (29 Dec 2021 20:19)  HR: 78 (30 Dec 2021 05:15) (56 - 91)  BP: 121/76 (30 Dec 2021 05:15) (101/61 - 121/76)  BP(mean): --  RR: 18 (30 Dec 2021 05:15) (17 - 18)  SpO2: 94% (30 Dec 2021 05:15) (94% - 98%)  I&O's Summary    29 Dec 2021 07:01  -  30 Dec 2021 07:00  --------------------------------------------------------  IN: 0 mL / OUT: 650 mL / NET: -650 mL          TELE: Not on telemetry   PHYSICAL EXAM:  Appearance: NAD, no distress, alert,  HEENT: Moist Mucous Membranes, Anicteric  Cardiovascular: Regular rate and rhythm, Normal S1 S2, No JVD, No murmurs, No rubs, gallops or clicks  Respiratory: Non-labored, Clear to auscultation, No rales, No rhonchi, No wheezing.   Gastrointestinal:  Soft, Non-tender, + BS  Neurologic: Non-focal  Skin: Warm and dry, No visible rashes or ulcers, No ecchymosis, No cyanosis, left thigh dressing with MELANIA drain intact   Musculoskeletal: No clubbing, No cyanosis, No joint swelling/tenderness  Psychiatry: Mood & affect appropriate  Lymph: No peripheral edema.     LABS: All Labs Reviewed:                        11.3   5.50  )-----------( 311      ( 30 Dec 2021 08:43 )             35.8                         11.4   5.69  )-----------( 315      ( 29 Dec 2021 08:42 )             37.6                         11.5   5.29  )-----------( 331      ( 28 Dec 2021 09:26 )             37.0     30 Dec 2021 08:43    142    |  107    |  22     ----------------------------<  170    3.9     |  28     |  1.10   29 Dec 2021 08:42    141    |  106    |  23     ----------------------------<  154    3.8     |  30     |  1.20   28 Dec 2021 09:26    141    |  107    |  21     ----------------------------<  184    3.8     |  27     |  1.20     Ca    9.5        30 Dec 2021 08:43  Ca    9.9        29 Dec 2021 08:42  Ca    9.6        28 Dec 2021 09:26  Phos  2.5       28 Dec 2021 09:26  Mg     2.2       28 Dec 2021 09:26    < from: Xray Chest 1 View AP/PA (12.17.21 @ 17:09) >    ACC: [id number] EXAM:  XR CHEST AP OR PA 1V                          PROCEDURE DATE:  12/17/2021          INTERPRETATION:  Evaluate for pneumonia    AP chest. Prior 9/3/2021.    Median sternotomy. Normal heart mediastinum. Hyperinflated lungs. No   consolidation or effusion.    IMPRESSION: Hyperinflated lungs. No active infiltrates    --- End of Report ---            RODRIGO FAITH MD; Attending Radiologist  This document has been electronically signed. Dec 18 2021  9:39AM    < end of copied text >  < from: TTE Echo Complete w/o Contrast w/ Doppler (12.20.21 @ 08:28) >     EXAM:  ECHO TTE WO CON COMP W DOPP         PROCEDURE DATE:  12/20/2021        INTERPRETATION:  INDICATION: Ischemic heart disease  sonographer KL    Blood Pressure 123/70    Height 180.3 cm     Weight 97.5 kg       BSA 2.2   sq m    Dimensions:  LA 3.0       Normal Values: 2.0 - 4.0 cm  Ao 3.5        Normal Values: 2.0 - 3.8 cm  SEPTUM 1.3       Normal Values: 0.6 - 1.2 cm  PWT 1.0       Normal Values: 0.6 - 1.1 cm  LVIDd 4.3         Normal Values: 3.0 - 5.6 cm  LVIDs 1.8         Normal Values: 1.8 - 4.0 cm      OBSERVATIONS:  Technically difficult and limited study  Mitral Valve: normal, trace physiologic MR.  Aortic Valve/Aorta: Sclerotic trileaflet aortic valve with normal opening.  Tricuspid Valve: Not well-visualized  Pulmonic Valve: Not well-visualized  Left Atrium: normal  Right Atrium: Not well-visualized  Left Ventricle: The left ventricular endocardium is not well-visualized.   Overall normal systolic function with an estimated LVEF of 55%. Cannot   evaluate for segmental abnormalities  Right Ventricle: Not well-visualized  Pericardium: no significant pericardial effusion.  Pulmonary/RV Pressure: estimated PA systolic pressure of 28 mmHg  LV diastolic dysfunction is present        IMPRESSION:  Technically difficult and limited study  The left ventricular endocardium is not well-visualized. Overall normal   systolic function with an estimated LVEF of 55-60%. Cannot evaluate for   segmental abnormalities  The right ventricle is not well-visualized  Sclerotic trileaflet aortic valve, without AI.  Trace physiologic MR  No significant pericardial effusion.    --- End of Report ---              ARISTIDES LEE MD; Attending Cardiologist  This document has been electronically signed. Dec 21 2021  1:38PM    < end of copied text >

## 2021-12-30 NOTE — PROGRESS NOTE ADULT - PROVIDER SPECIALTY LIST ADULT
Cardiology
Infectious Disease
Orthopedics
Pulmonology
Cardiology
Endocrinology
Infectious Disease
Nephrology
Nephrology
Pulmonology
Cardiology
Nephrology
Nephrology
Pulmonology
Endocrinology
Hospitalist

## 2021-12-30 NOTE — DISCHARGE NOTE NURSING/CASE MANAGEMENT/SOCIAL WORK - NURSING SECTION COMPLETE
Clinic Care Coordination Contact  Community Health Worker Initial Outreach    CHW Initial Information Gathering:  Referral Source: ED Follow-Up  Preferred Hospital: [facility]  [phone]  Equipment Currently Used at Home: none  Informal Support system:: Parent  No PCP office visit in Past Year: No  Transportation means:: Family    Patient accepts CC: Yes     Phone Assessment is scheduled on 6/30/20 at 10:30am with JACINTO Vogt RN.    ROBERTO Almonte  Clinic Care Coordination  [facility]  Phone: [phone]   Patient/Caregiver provided printed discharge information.

## 2021-12-30 NOTE — PROGRESS NOTE ADULT - ATTENDING SUPERVISION STATEMENT
ACP
Resident
ACP
ACP
Resident
Resident
ACP
Resident

## 2021-12-30 NOTE — PROGRESS NOTE ADULT - PROBLEM SELECTOR PROBLEM 1
Complicated abscess
Type 2 diabetes mellitus
Type 2 diabetes mellitus
Abscess
Complicated abscess
Complicated abscess
Abscess
Complicated abscess
Abscess
Abscess
Complicated abscess

## 2021-12-30 NOTE — DISCHARGE NOTE NURSING/CASE MANAGEMENT/SOCIAL WORK - PATIENT PORTAL LINK FT
You can access the FollowMyHealth Patient Portal offered by [facility] by registering at the following website: [email or web address]. By joining [employer]’s FollowMyHealth portal, you will also be able to view your health information using other applications (apps) compatible with our system.

## 2021-12-30 NOTE — DISCHARGE NOTE NURSING/CASE MANAGEMENT/SOCIAL WORK - NSDCPEFALRISK_GEN_ALL_CORE
For information on Fall & Injury Prevention, visit: [email or web address] OR  [email or web address] OR  https://www.cdc.gov/steadi/patient.html

## 2021-12-30 NOTE — PROGRESS NOTE ADULT - SUBJECTIVE AND OBJECTIVE BOX
CAPILLARY BLOOD GLUCOSE      POCT Blood Glucose.: 157 mg/dL (30 Dec 2021 07:37)  POCT Blood Glucose.: 241 mg/dL (29 Dec 2021 21:38)  POCT Blood Glucose.: 144 mg/dL (29 Dec 2021 16:45)  POCT Blood Glucose.: 224 mg/dL (29 Dec 2021 12:27)      Vital Signs Last 24 Hrs  T(C): 36.7 (30 Dec 2021 05:15), Max: 37.1 (29 Dec 2021 20:19)  T(F): 98.1 (30 Dec 2021 05:15), Max: 98.8 (29 Dec 2021 20:19)  HR: 78 (30 Dec 2021 05:15) (56 - 91)  BP: 121/76 (30 Dec 2021 05:15) (101/61 - 121/76)  BP(mean): --  RR: 18 (30 Dec 2021 05:15) (17 - 18)  SpO2: 94% (30 Dec 2021 05:15) (94% - 98%)    Respiratory: CTA B/L  CV: RRR, S1S2, no murmurs, rubs or gallops  Abdominal: Soft, NT, ND +BS, Last BM  Extremities: No edema, + peripheral pulses     12-30    142  |  107  |  22  ----------------------------<  170<H>  3.9   |  28  |  1.10    Ca    9.5      30 Dec 2021 08:43        atorvastatin 80 milliGRAM(s) Oral at bedtime  dextrose 40% Gel 15 Gram(s) Oral once  dextrose 50% Injectable 25 Gram(s) IV Push once  dextrose 50% Injectable 12.5 Gram(s) IV Push once  dextrose 50% Injectable 25 Gram(s) IV Push once  glucagon  Injectable 1 milliGRAM(s) IntraMuscular once  glucagon  Injectable 1 milliGRAM(s) IntraMuscular once  insulin glargine Injectable (LANTUS) 10 Unit(s) SubCutaneous at bedtime  insulin lispro (ADMELOG) corrective regimen sliding scale   SubCutaneous three times a day before meals  insulin lispro (ADMELOG) corrective regimen sliding scale   SubCutaneous at bedtime  insulin lispro Injectable (ADMELOG) 4 Unit(s) SubCutaneous before breakfast  insulin lispro Injectable (ADMELOG) 4 Unit(s) SubCutaneous before lunch  insulin lispro Injectable (ADMELOG) 4 Unit(s) SubCutaneous before dinner  predniSONE   Tablet 2 milliGRAM(s) Oral daily

## 2021-12-30 NOTE — PROGRESS NOTE ADULT - ASSESSMENT
80yo male with pmhx DM2, CAD s/p 3v CABG 2004, stents in 2019, HLD, HTN, PAT(on Eliquis)?, PVD(s/p stents in b/l legs -- right leg in 2020), osteomyelitis, COPD on prn home o2 and nocturnal bipap, type 2 Dm who presented to ED with increasing left leg pain x 1 week.  Now found to have w/ osteomyelitis, concern for intramedullary abscess    TTE 8/3/21 w/ hypokinesis mild DD   EKG, NSR no ischemic changes noted     - s/p IR drain/placement Left Femur, tolerated well and stable from CV POV, awaiting for path, Abx per primary team  - Continue Plavix, ASA, Eliquis, statin    - BP controlled and stable.    - continue BB with hold parameters    - No evidence of volume overload or ischemic symptoms    - Monitor and replete Lytes. Keep K > 4 and Mg > 2  - Will continue to follow.    Nikki Chan [facility]  Nurse Practitioner - Cardiology   Spectra #[id number]/ [phone]

## 2021-12-30 NOTE — PROGRESS NOTE ADULT - SUBJECTIVE AND OBJECTIVE BOX
[facility]  INFECTIOUS DISEASES   [address] [place] [address]  Tel: [phone]     Fax: [phone]  ======================================================  MD Noman Perry Kaushal, MD Cho, Michelle, MD   ======================================================    [facility]-[id number]  YUE Ravia     Follow up: Left femoral OM and intramedullary abscess     Patient seen and examined, no overnight event.   No fever, no new complaint, no new changes.   S/P IR drainage of 60ML purulent fluid, now has drain in place.   Also had PICC line.     PAST MEDICAL & SURGICAL HISTORY:  Diabetes Mellitus, Type II  CAD (Coronary Artery Disease)  s/p 3v CABG ; stents placed in [place] in   Dyslipidemia  Osteomyelitis  COPD (chronic obstructive pulmonary disease)  on 2L at home and BiPAP at night; intubated   Hypertension  PVD (peripheral vascular disease)  History of PAT (paroxysmal atrial tachycardia)  CABG (Coronary Artery Bypass Graft)    Compound fracture  left leg  S/P primary angioplasty with coronary stent    Social Hx: Former smoker, no ETOH or drugs     FAMILY HISTORY:  Family history of diabetes mellitus (Sibling)  Family hx of lung cancer  brother,  age 82, used to smoke with pt  Allergies  No Known Allergies    Intolerances  shellfish (Nausea)    Antibiotics:  MEDICATIONS  (STANDING):  atorvastatin 80 milliGRAM(s) Oral at bedtime  azithromycin   Tablet 250 milliGRAM(s) Oral <User Schedule>  budesonide 160 MICROgram(s)/formoterol 4.5 MICROgram(s) Inhaler 2 Puff(s) Inhalation two times a day  cefepime   IVPB 2000 milliGRAM(s) IV Intermittent every 12 hours  clopidogrel Tablet 75 milliGRAM(s) Oral daily  dextrose 40% Gel 15 Gram(s) Oral once  dextrose 5%. 1000 milliLiter(s) (50 mL/Hr) IV Continuous <Continuous>  dextrose 5%. 1000 milliLiter(s) (100 mL/Hr) IV Continuous <Continuous>  dextrose 50% Injectable 25 Gram(s) IV Push once  dextrose 50% Injectable 12.5 Gram(s) IV Push once  dextrose 50% Injectable 25 Gram(s) IV Push once  glucagon  Injectable 1 milliGRAM(s) IntraMuscular once  insulin lispro (ADMELOG) corrective regimen sliding scale   SubCutaneous three times a day before meals  insulin lispro (ADMELOG) corrective regimen sliding scale   SubCutaneous at bedtime  metoprolol tartrate 50 milliGRAM(s) Oral two times a day  predniSONE   Tablet 4 milliGRAM(s) Oral daily  sodium chloride 0.9%. 1000 milliLiter(s) (60 mL/Hr) IV Continuous <Continuous>  tamsulosin 0.4 milliGRAM(s) Oral at bedtime  tiotropium 18 MICROgram(s) Capsule 1 Capsule(s) Inhalation daily  vancomycin  IVPB 1500 milliGRAM(s) IV Intermittent every 24 hours     REVIEW OF SYSTEMS:  CONSTITUTIONAL:  No Fever or chills  HEENT:  No diplopia or blurred vision.  No sore throat or runny nose.  CARDIOVASCULAR:  No chest pain or SOB.  RESPIRATORY:  No cough, shortness of breath, PND or orthopnea.  GASTROINTESTINAL:  No nausea, vomiting or diarrhea.  GENITOURINARY:  No dysuria, frequency or urgency. No Blood in urine  MUSCULOSKELETAL: left leg pain   SKIN:  No change in skin, hair or nails.  NEUROLOGIC:  No paresthesias, fasciculations, seizures or weakness.  PSYCHIATRIC:  No disorder of thought or mood.  ENDOCRINE:  No heat or cold intolerance, polyuria or polydipsia.  HEMATOLOGICAL:  No easy bruising or bleeding.     Physical Exam:  Vital Signs Last 24 Hrs  T(C): 36.7 (30 Dec 2021 05:15), Max: 37.1 (29 Dec 2021 20:19)  T(F): 98.1 (30 Dec 2021 05:15), Max: 98.8 (29 Dec 2021 20:19)  HR: 78 (30 Dec 2021 05:15) (56 - 91)  BP: 121/76 (30 Dec 2021 05:15) (101/61 - 121/76)  BP(mean): --  RR: 18 (30 Dec 2021 05:15) (17 - 18)  SpO2: 94% (30 Dec 2021 05:15) (94% - 98%)  GEN: NAD, obese   HEENT: normocephalic and atraumatic. EOMI. PERRL.    NECK: Supple.  No lymphadenopathy   LUNGS: Clear to auscultation.  HEART: Regular rate and rhythm   ABDOMEN: Soft, nontender, and nondistended.  Positive bowel sounds.    : No CVA tenderness  EXTREMITIES: left leg in thigh area has a scar in lateral side with no discharge or open wound.  Swelling in anterior part, no tenderness or fluctuation. No sign of cellulitis on soft tissue.   NEUROLOGIC: grossly intact.  PSYCHIATRIC: Appropriate affect .  SKIN: No rash, as above     Labs:                        11.3   5.50  )-----------( 311      ( 30 Dec 2021 08:43 )             35.8         142  |  107  |  22  ----------------------------<  170<H>  3.9   |  28  |  1.10    Ca    9.5      30 Dec 2021 08:43    Culture - Abscess with Gram Stain (collected 21 @ 17:37)  Source: .Abscess Left femur/perifemur    Culture - Blood (collected 21 @ 21:22)  Source: .Blood Blood  Final Report (21 @ 22:01):    No Growth Final    Culture - Blood (collected 21 @ 21:22)  Source: .Blood Blood  Final Report (21 @ 22:01):    No Growth Final    WBC Count: 5.50 K/uL (21 @ 08:43)  WBC Count: 5.69 K/uL (21 @ 08:42)  WBC Count: 5.29 K/uL (21 @ 09:26)  WBC Count: 6.63 K/uL (21 @ 08:22)  WBC Count: 6.23 K/uL (21 @ 08:29)    Creatinine, Serum: 1.10 mg/dL (21 @ 08:43)  Creatinine, Serum: 1.20 mg/dL (21 @ 08:42)  Creatinine, Serum: 1.20 mg/dL (21 @ 09:26)  Creatinine, Serum: 1.30 mg/dL (21 @ 08:22)  Creatinine, Serum: 1.30 mg/dL (21 @ 08:29)  Creatinine, Serum: 1.40 mg/dL (21 @ 16:45)    C-Reactive Protein, Serum: 11 mg/L (21 @ 15:15)  C-Reactive Protein, Serum: 9 mg/L (21 @ 12:13)  C-Reactive Protein, Serum: 81 mg/L (21 @ 15:37)  C-Reactive Protein, Serum: 70 mg/L (21 @ 17:04)    Sedimentation Rate, Erythrocyte: 23 mm/hr (21 @ 10:28)  Sedimentation Rate, Erythrocyte: 25 mm/hr (21 @ 08:22)  Sedimentation Rate, Erythrocyte: 60 mm/hr (21 @ 09:06)  Sedimentation Rate, Erythrocyte: 42 mm/hr (21 @ 10:44)    COVID- PCR: NotDetec (21 @ 07:19)  COVID- PCR: NotDetec (21 @ 07:54)  COVID- PCR: NotDetec (21 @ 17:43)    All imaging and other data have been reviewed.  < from: CT Femur No Cont, Left (21 @ 22:24) >  IMPRESSION:  Old healed deformity of the mid left femur likely correlating with the   history of a prior osteomyelitis. Complex heterogeneous lobulated soft   tissue anterior and lateral to the middle two thirds of the left femur   concerning for an abscess. New lobulated intramedullary defect with tract   extending to the cortical surface in the mid left femur concerning for a   possible intramedullary abscess. A contrast-enhancedMRI of the left   femur is recommended for further evaluation.    Assessment and Plan:   80yo man with PMH of HTN, COPD on home O2, CAD s/p CABG, PVD s/p stents in b/l legs and left femoral fracture more than 50 years ago, was admitted with left leg pain on the site of old fracture after CT showed possible intramedullary abscess. He has had pain and infection in the old fracture area at least 3-4 times in the past, had multiple surgeries and drainage of area with a long term antibiotic treatment, last one years ago.   Most likely another infection and osteomyelitis with collection s/p IR drain placement.   Doppler neg for DVTs  Admission WBC normal, no fever  ESR=60-->23     CRP=70-->11  Usually MRSA grows in cultures, if negative most likely MRSA is not the cause of infection. Collection looked infectious but culture is negative, will narrow down the coverage to ceftriaxone since no sign of pseudomonas.   Ceftriaxone has a good penetration to bone and joint.     1- Left femoral OM and intramedullary abscess   - Blood cultures NGTD   - MRI showed collection, intraosseous abscess   - S/P IR drain placement on   - IR culture NGTD unclear if due to ABx use prior to drainage?   - Continue ceftriaxone 2gm   - Will need a long treatment course will plan for last day on   - S/P PICC line  - Follow up with ortho and ID after discharge     2- COPD  - Continue azithromycin 250mg 3times weekly prophylactically   - Pulmonary consult noted  - On o2 and BiPAP at night time      Will follow PRN.    Brandi العلي MD  Division of Infectious Diseases   Cell [phone] between 8am and 6pm   After 6pm and weekends please call ID service at [phone].     >35 minutes spent on total encounter assessing patient, examination, chart reivew, counseling and coordinating care by the attending physician/nurse/care manager.

## 2021-12-30 NOTE — PROGRESS NOTE ADULT - ASSESSMENT
COMMODORE TURNER 81 m 2021 [facility] P [phone]  1940  VERNON ESPINOZA    REVIEW OF SYMPTOMS      Able to give ROS  Yes     RELIABLE +/-   CONSTITUTIONAL Weakness Yes  Chills No   ENDOCRINE  No heat or cold intolerance    ALLERGY No hives  Sore throat No stridor  RESP Coughing blood no  Shortness of breath YES   NEURO No Headache  Confusion Pain neck No   CARDIAC No Chest pain No Palpitations   GI  Pain abdomen NO   Vomiting NO     PHYSICAL EXAM    HEENT Unremarkable  atraumatic   RESP Fair air entry EXP prolonged    Harsh breath sound Resp distres mild   CARDIAC S1 S2 No S3     NO JVD    ABDOMEN SOFT BS PRESENT NOT DISTENDED No hepatosplenomegaly PEDAL EDEMA present No calf tenderness  NO rash       ________________  PATIENT PRESENTATION.   82yo man with PMH of HTN, COPD on home O2, CAD s/p CABG, PVD s/p stents in b/l legs and left femoral fracture more than 50 years ago, was admitted 2021 with left leg pain on the site of old fracture after CT showed possible intramedullary abscess. He has had pain and infection in the old fracture area at least 3-4 times in the past, had multiple surgeries and drainage of area with a long term antibiotic treatment, last one years ago.   Most likely he is here now  with another infection and osteomyelitis with collection in area that needs intervention by ortho. Is on coverage empirically with 4th Gen cephalosporins and vancomycin until  culture info.   Doppler neg for DVTs  Admission WBC normal, no fever  ESR 60     CRP 70  Pt admitted  Pulm consulted         BEST PRACTICE ISSUES.                                                  HEAD OF BED ELEVATION. Yes  DVT PROPHYLAXIS.    2021 apixaba 5.2 (a f)                                      MCCORMICK PROPHYLAXIS.                                                                                         DIET.     cons carb                       INFECTION PROPHYLAXIS.    SPEECH SWALLOW RECOMMENDATIONS.       PATIENT DATA   VITALS/PO/IO/VENT/DRIPS.   2021 afeb 70 120/70   2021 ra 945     ASSESSMENT/RECOMMENDATIONS.    RESP.   is on noct bpap    HEMODYNAMICS.   bp ok    VANCO t   --2021 VT 11.7-13 - 15     INFECTION.   IR drainage l femur abscess aubrey drain  AZITHRO q MWF    Cefepime 2.2    Vanco 1.5/d   2021 ID plans to change to rocephin till       COPD   symbicort 160    pred 4 -> 2021 pred 2   2021 change dw spouse    symbicor   under control    CAD.   metoprolol 50.2    asa 81   2021 plavx     CHF   lasix 40     ANEMIA  Hb 2021 Hb 11   Monitor     CKD  Cr --2021 Cr 1.4- 1.5-1.3  Monitor         TIME SPENT   Over 25 minutes aggregate care time spent on encounter; activities included   direct patient care, counseling and/or coordinating care reviewing notes, lab data/ imaging , discussion with multidisciplinary team/ patient  /family and explaining in detail risks, benefits, alternatives  of the recommendations     COMMODORE TURNER 81 m 2021 [facility] P [phone]  1940  VERNON ESPINOZA

## 2021-12-30 NOTE — PROGRESS NOTE ADULT - PROBLEM SELECTOR PLAN 1
cont lantus 10 units qhs  cont admelog 4 units 3x/day before meals  cont admelog coverage scale qac/qhs  cont cons cho diet  goal bg 100-180 in hosp setting

## 2022-01-01 LAB
CULTURE RESULTS: SIGNIFICANT CHANGE UP
SPECIMEN SOURCE: SIGNIFICANT CHANGE UP

## 2022-01-03 PROBLEM — Z86.79 PERSONAL HISTORY OF OTHER DISEASES OF THE CIRCULATORY SYSTEM: Chronic | Status: ACTIVE | Noted: 2021-12-18

## 2022-01-10 ENCOUNTER — OUTPATIENT (OUTPATIENT)
Dept: OUTPATIENT SERVICES | Facility: HOSPITAL | Age: 82
LOS: 1 days | End: 2022-01-10
Payer: COMMERCIAL

## 2022-01-10 DIAGNOSIS — Z95.5 PRESENCE OF CORONARY ANGIOPLASTY IMPLANT AND GRAFT: Chronic | ICD-10-CM

## 2022-01-10 DIAGNOSIS — Z20.828 CONTACT WITH AND (SUSPECTED) EXPOSURE TO OTHER VIRAL COMMUNICABLE DISEASES: ICD-10-CM

## 2022-01-10 DIAGNOSIS — T14.8XXA OTHER INJURY OF UNSPECIFIED BODY REGION, INITIAL ENCOUNTER: Chronic | ICD-10-CM

## 2022-01-10 LAB — SARS-COV-2 RNA SPEC QL NAA+PROBE: SIGNIFICANT CHANGE UP

## 2022-01-10 PROCEDURE — U0005: CPT

## 2022-01-10 PROCEDURE — U0003: CPT

## 2022-01-13 ENCOUNTER — RESULT REVIEW (OUTPATIENT)
Age: 82
End: 2022-01-13

## 2022-01-13 ENCOUNTER — OUTPATIENT (OUTPATIENT)
Dept: OUTPATIENT SERVICES | Facility: HOSPITAL | Age: 82
LOS: 1 days | End: 2022-01-13
Payer: COMMERCIAL

## 2022-01-13 DIAGNOSIS — Z95.5 PRESENCE OF CORONARY ANGIOPLASTY IMPLANT AND GRAFT: Chronic | ICD-10-CM

## 2022-01-13 DIAGNOSIS — T14.8XXA OTHER INJURY OF UNSPECIFIED BODY REGION, INITIAL ENCOUNTER: Chronic | ICD-10-CM

## 2022-01-13 DIAGNOSIS — L03.119 CELLULITIS OF UNSPECIFIED PART OF LIMB: ICD-10-CM

## 2022-01-13 PROCEDURE — 73700 CT LOWER EXTREMITY W/O DYE: CPT | Mod: 26,LT,MA

## 2022-01-13 PROCEDURE — 73700 CT LOWER EXTREMITY W/O DYE: CPT | Mod: MA

## 2022-01-13 PROCEDURE — C1769: CPT

## 2022-01-13 PROCEDURE — 75984 XRAY CONTROL CATHETER CHANGE: CPT | Mod: 26

## 2022-01-13 PROCEDURE — 75984 XRAY CONTROL CATHETER CHANGE: CPT

## 2022-01-13 PROCEDURE — C1729: CPT

## 2022-01-16 ENCOUNTER — OUTPATIENT (OUTPATIENT)
Dept: OUTPATIENT SERVICES | Facility: HOSPITAL | Age: 82
LOS: 1 days | End: 2022-01-16
Payer: COMMERCIAL

## 2022-01-16 DIAGNOSIS — T14.8XXA OTHER INJURY OF UNSPECIFIED BODY REGION, INITIAL ENCOUNTER: Chronic | ICD-10-CM

## 2022-01-16 DIAGNOSIS — Z95.5 PRESENCE OF CORONARY ANGIOPLASTY IMPLANT AND GRAFT: Chronic | ICD-10-CM

## 2022-01-16 DIAGNOSIS — Z20.828 CONTACT WITH AND (SUSPECTED) EXPOSURE TO OTHER VIRAL COMMUNICABLE DISEASES: ICD-10-CM

## 2022-01-16 LAB — SARS-COV-2 RNA SPEC QL NAA+PROBE: SIGNIFICANT CHANGE UP

## 2022-01-16 PROCEDURE — U0005: CPT

## 2022-01-16 PROCEDURE — U0003: CPT

## 2022-01-18 NOTE — PROGRESS NOTE ADULT - PROBLEM/PLAN-9
DISPLAY PLAN FREE TEXT
There are no Wet Read(s) to document.
DISPLAY PLAN FREE TEXT

## 2022-01-19 ENCOUNTER — RESULT REVIEW (OUTPATIENT)
Age: 82
End: 2022-01-19

## 2022-01-19 ENCOUNTER — OUTPATIENT (OUTPATIENT)
Dept: OUTPATIENT SERVICES | Facility: HOSPITAL | Age: 82
LOS: 1 days | End: 2022-01-19
Payer: COMMERCIAL

## 2022-01-19 DIAGNOSIS — L03.119 CELLULITIS OF UNSPECIFIED PART OF LIMB: ICD-10-CM

## 2022-01-19 DIAGNOSIS — T14.8XXA OTHER INJURY OF UNSPECIFIED BODY REGION, INITIAL ENCOUNTER: Chronic | ICD-10-CM

## 2022-01-19 DIAGNOSIS — Z95.5 PRESENCE OF CORONARY ANGIOPLASTY IMPLANT AND GRAFT: Chronic | ICD-10-CM

## 2022-01-19 PROCEDURE — 49424 ASSESS CYST CONTRAST INJECT: CPT

## 2022-01-19 PROCEDURE — 76000 FLUOROSCOPY <1 HR PHYS/QHP: CPT

## 2022-01-19 PROCEDURE — 76080 X-RAY EXAM OF FISTULA: CPT | Mod: 26

## 2022-01-19 NOTE — PROCEDURE NOTE - NSICDXPROCEDURE_GEN_ALL_CORE_FT
PROCEDURES:  Contrast injection for assessment via drainage catheter 19-Jan-2022 14:28:46  Scott Franco

## 2022-01-19 NOTE — PROCEDURE NOTE - PROCEDURE FINDINGS AND DETAILS
PROCEDURE DATE:  01/19/2022      INTERPRETATION:  History: 81-year-old male here for drain check for drain   inserted into his left thigh status post abscess collection. Patient with   no fever no chills. Drainage is decreasing but bumped up today.    Contrast: Omnipaque 350    Complication: None    Procedure:  The procedure, risks, benefits, and alternatives were discussed with the   patient in laymen's terms in the presence of an Interventional Radiology   nurse and informed consent was placed in the chart.     film revealed drain in left thigh. Patient prepped and draped in   normal sterile fashion. 2 cc of 50-50 Omnipaque contrast instilled into   catheter. It appears that the pocket is collapsed and was noted that the   contrast migrated proximally. Patient did not experience any pain or   discomfort during the injection of contrast.    The patient  tolerated the procedure without complication and left the   suite in stable condition.    Findings:  Abscess drain in left thigh.    Impression:  Pocket is collapsed and was noted that the contrast migrated proximally.  Due to the bump in drainage today to 20 cc, will recommend patient   returning in 1 week for possible removal of the drain.

## 2022-01-24 ENCOUNTER — OUTPATIENT (OUTPATIENT)
Dept: OUTPATIENT SERVICES | Facility: HOSPITAL | Age: 82
LOS: 1 days | End: 2022-01-24
Payer: COMMERCIAL

## 2022-01-24 DIAGNOSIS — Z95.5 PRESENCE OF CORONARY ANGIOPLASTY IMPLANT AND GRAFT: Chronic | ICD-10-CM

## 2022-01-24 DIAGNOSIS — T14.8XXA OTHER INJURY OF UNSPECIFIED BODY REGION, INITIAL ENCOUNTER: Chronic | ICD-10-CM

## 2022-01-24 DIAGNOSIS — Z20.828 CONTACT WITH AND (SUSPECTED) EXPOSURE TO OTHER VIRAL COMMUNICABLE DISEASES: ICD-10-CM

## 2022-01-24 LAB — SARS-COV-2 RNA SPEC QL NAA+PROBE: SIGNIFICANT CHANGE UP

## 2022-01-24 PROCEDURE — U0003: CPT

## 2022-01-24 PROCEDURE — U0005: CPT

## 2022-01-26 ENCOUNTER — RESULT REVIEW (OUTPATIENT)
Age: 82
End: 2022-01-26

## 2022-01-26 ENCOUNTER — OUTPATIENT (OUTPATIENT)
Dept: OUTPATIENT SERVICES | Facility: HOSPITAL | Age: 82
LOS: 1 days | End: 2022-01-26
Payer: COMMERCIAL

## 2022-01-26 DIAGNOSIS — Z95.5 PRESENCE OF CORONARY ANGIOPLASTY IMPLANT AND GRAFT: Chronic | ICD-10-CM

## 2022-01-26 DIAGNOSIS — L03.119 CELLULITIS OF UNSPECIFIED PART OF LIMB: ICD-10-CM

## 2022-01-26 DIAGNOSIS — T14.8XXA OTHER INJURY OF UNSPECIFIED BODY REGION, INITIAL ENCOUNTER: Chronic | ICD-10-CM

## 2022-01-26 PROCEDURE — 49424 ASSESS CYST CONTRAST INJECT: CPT

## 2022-01-26 PROCEDURE — 76000 FLUOROSCOPY <1 HR PHYS/QHP: CPT | Mod: 26

## 2022-01-26 PROCEDURE — 76000 FLUOROSCOPY <1 HR PHYS/QHP: CPT

## 2022-01-26 NOTE — PROCEDURE NOTE - PROCEDURE FINDINGS AND DETAILS
last 5 days of output into drain were 15cc, 20cc, 7cc, 7cc, and 7cc.  Patient has no pain, fever or chills.    Patient was prepped and draped in normal sterile fashion.   film done.  Catheter was cut and removed without incident.  Clean dressing applied.

## 2022-02-16 NOTE — ED PROVIDER NOTE - NS ED MD DISPO DISCHARGE
Home

## 2022-03-01 ENCOUNTER — APPOINTMENT (OUTPATIENT)
Dept: PULMONOLOGY | Facility: CLINIC | Age: 82
End: 2022-03-01
Payer: MEDICARE

## 2022-03-01 VITALS
TEMPERATURE: 97.6 F | HEIGHT: 70 IN | BODY MASS INDEX: 30.92 KG/M2 | WEIGHT: 216 LBS | RESPIRATION RATE: 16 BRPM | OXYGEN SATURATION: 99 % | DIASTOLIC BLOOD PRESSURE: 89 MMHG | HEART RATE: 112 BPM | SYSTOLIC BLOOD PRESSURE: 165 MMHG

## 2022-03-01 PROCEDURE — 99214 OFFICE O/P EST MOD 30 MIN: CPT

## 2022-03-01 RX ORDER — PREDNISONE 5 MG/1
5 TABLET ORAL
Qty: 90 | Refills: 1 | Status: COMPLETED | COMMUNITY
Start: 2021-07-29 | End: 2022-03-01

## 2022-03-01 NOTE — PHYSICAL EXAM
[General Appearance - Well Developed] : well developed [Well Groomed] : well groomed [Normal Appearance] : normal appearance [General Appearance - Well Nourished] : well nourished [General Appearance - In No Acute Distress] : no acute distress [Normal Conjunctiva] : the conjunctiva exhibited no abnormalities [Normal Oropharynx] : normal oropharynx [Neck Appearance] : the appearance of the neck was normal [Jugular Venous Distention Increased] : there was no jugular-venous distention [Neck Cervical Mass (___cm)] : no neck mass was observed [Heart Rate And Rhythm] : heart rate and rhythm were normal [Heart Sounds] : normal S1 and S2 [Murmurs] : no murmurs present [Arterial Pulses Normal] : the arterial pulses were normal [Respiration, Rhythm And Depth] : normal respiratory rhythm and effort [Exaggerated Use Of Accessory Muscles For Inspiration] : no accessory muscle use [Abdomen Soft] : soft [Abdomen Tenderness] : non-tender [Abnormal Walk] : normal gait [Cyanosis, Localized] : no localized cyanosis [Nail Clubbing] : no clubbing of the fingernails [Cranial Nerves] : cranial nerves 2-12 were intact [Oriented To Time, Place, And Person] : oriented to person, place, and time [Motor Exam] : the motor exam was normal [Affect] : the affect was normal [Mood] : the mood was normal [Skin Color & Pigmentation] : normal skin color and pigmentation [] : no rash [Skin Lesions] : no skin lesions [FreeTextEntry1] : decreased air entry bilaterally; no wheezing

## 2022-03-01 NOTE — HISTORY OF PRESENT ILLNESS
[FreeTextEntry1] : Mr. 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 \par \par He was recently hospitalized He was recently hospitalized at [facility] in Dec 2021 with left femoral OM and intramedullary abscess with likely adjacent chronic OM. He had an IR drain which has since been removed and he completed a course of antibiotics (Cefepime-->ceftriaxone and Vancomycin). Blood cultures and abscess cultures were negative. Hospital course was complicated by mild MERRILL that resolved. Of note, his respiratory status remained stable throughout hospital stay. His PICC line was removed 2 weeks ago and drain was removed just prior. He just completed home PT on 2/24/22.\par \par Last pulmonary hospitalization was in September 2021 for COPD exacerbation with acute on chronic hypercapnia that improved with steroids, nebs, and BiPAP. He was previously hospitalized at  in May 2021 with asthma/COPD exacerbation with acute on chronic hypercapnic respiratory failure requiring BiPAP. Cause was attributed to change of season and exposure to flowers at home. He improved with BiPAP, steroids, nebulizers, and levofloxacin. \par \par Currently, he reports mild dyspnea. His wife administers Mepolizumab every month. He is currently on prednisone 1-2 mg daily. He is adherent with Breo Ellipta 200-25 mcg and Incruse Ellipta. His montelukast was held previously given his EDS. He remains on azithromycin 250 mg MWF. He is adherent with BiPAP (IPAP 18, EPAP 8) every night from 11pm until 9am. He also wears oxygen during the day occasionally with walking, but wife reports that saturation is up to 95% with going up a flight of stairs. He recently completed home PT after recent hospitalization.\par \par He has not went for pulmonary rehab. Declining physical therapy at home or pulmonary rehab at this time. Has weights, stationary bike, treadmill, and pool at home, and reports that he will start using. He goes to sleep at around 11pm-12am, wakes up at 9am, then naps from 1pm to 2-3 pm. \par \par Note, recent ABG in May 2021 with pH 7.16, pCO2 86, pO2 347, SaO2 99%. Repeat ABG after BiPAP improved to pH 7.40, pCO2 48, pO2 89, SaO2 97%.\par \par Last 2D-echo in Dec 2021 with mild diastolic dysfunction, normal LV systolic function, no significant valvular disease, and no pulmonary hypertension (estimated RVSP is 28). \par \par PFTs (Dec 2020) with severe obstruction. TLC is normal, but RV is increased to 165%. Diffusion capacity is moderately reduced.\par \par Exercise oximetry (Dec 2020) normal oxygen saturation at rest, but developed hypoxemia to 85% while walking at 1.1 MPH requiring 2L NC. \par \par 6MWD (Dec 2020) is 132 meters.\par \par Last CT chest in June 2020 with interval improvement and near resolution of LLL pneumonia with minimal residual atelectasis. There is new linear atelectasis in the RLL. Stable emphysema.\par \par Regarding his smoking history, he quit smoking in the 1980s, used to smoke 1 ppd for 30 years. Was smoking marijuana about 3-4 joints 3x/week until 2017. No alcohol use. No other drug use

## 2022-03-01 NOTE — ASSESSMENT
[FreeTextEntry1] : pft\par eot\par 6mwd\par azelastine in addition to flonase\par \par Mr. 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 He was recently hospitalized at [facility] in Dec 2021 with left femoral OM and intramedullary abscess with likely adjacent chronic OM. He had an IR drain which has since been removed and he completed a course of antibiotics (Cefepime-->ceftriaxone and Vancomycin). Blood cultures and abscess cultures were negative. Of note, his respiratory status remained stable throughout hospital stay. He just completed home PT on 2/24/22.\par \par 1. 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 There is moderately reduced diffusing capacity. Needs repeat PFTs\par - ABG in May 2021 during hospitalization initially 7.16/86, improved to 7.40/48 with BiPAP. Most recent ABG during exacerbation in Sept 2021 with pH 7.3, pCO2 64\par - Resolved eosinophilia with mepolizumab and prednisone. IgE previously elevated to 118 in Dec 2019 with allergy testing positive for house dust. Aspergillus IgE negative. Alpha-1 antitrypsin normal and HIV negative\par - Given bronchial hyperreactivity and peripheral eosinophilia, I suspect Asthma-COPD Overlap Syndrome\par - Continue monthly Mepolizumab injections\par - Prednisone 2 mg daily\par - Continue with Breo Ellipta 200-25 mcg once daily (rinse after use) + Incruse Ellipta 62.5 mcg daily\par - Montelukast 10 mg qhs\par - Azithromycin 250 mg MWF\par - Ventolin prn with spacer\par - Consider restarting pulmonary rehab\par - We discussed the importance of regular exercise. He will try to increase walking within the house and start walking outdoors in the spring and summer. He will try his stationary bike and treadmill at home. Wants to resume swimming in the spring\par - Repeat PFTs, EOT, 6-MWD\par - Reconsider BLVR if continues to experience exacerbations despite increasing exercise\par \par 2. Chronic hypoxemia and hypercapnic respiratory failure:\par - Likely due to asthma/COPD\par - Continue supplemental oxygen with nasal cannula 2-3 LPM with exercise, goal SpO2>88%\par - BiPAP every night, IPAP 18, EPAP 8\par - 6MWD (Dec 2020) is 148 meters using cane and 2L NC. EOT (Dec 2020) required 2L NC\par \par 3. Abnormal CT Chest\par - CT Chest in June 2020 with interval predominant resolution of the previously seen LLL consolidation with minimal residual atelectasis. New RLL area of linear atelectasis\par - Hold off on further imaging at this time\par \par 4. Bilateral leg edema:\par - Improved with low salt diet\par - 2D-echo in August 2021 with mild segmental LV systolic dysfunction with hypokinesis of the apical inferior, apical septum, apical lateral, basal inferior, mid anterior, and mid inferior walls. Also with mild diastolic dysfunction\par - Continue cardiac meds, including apixaban, clopidogrel, rosuvastatin, metoprolol, and losartan\par - Continue low salt diet, keep legs elevated and compression stockings\par - No evidence of pulmonary hypertension on recent 2D-echo\par \par 5. Upper airway cough syndrome:\par - Continue fluticasone nasal spray, 1-2 sprays per nostril twice daily\par - Add azelastine nasal spray twice daily\par \par 6. HCM:\par - Up-to-date with influenza, pneumococcal, and COVID vaccinations\par - Self-injecting Mepolizumab at home\par \par 7. Follow-up in 2-3 months or sooner prn, needs PFTs/EOT/6MWD Oriented - self; Oriented - place; Oriented - time

## 2022-03-05 NOTE — PROGRESS NOTE ADULT - PROBLEM/PLAN-4
DISPLAY PLAN FREE TEXT
chronic back pain, now radiated to Lt buttock/leg with toe numbness.  Please have neuro see pt within 1-2 weeks

## 2022-03-11 NOTE — PHYSICAL THERAPY INITIAL EVALUATION ADULT - SKIN MOISTURE
[Alert] : alert [No Acute Distress] : no acute distress [Playful] : playful [Normocephalic] : normocephalic [Conjunctivae with no discharge] : conjunctivae with no discharge [PERRL] : PERRL [EOMI Bilateral] : EOMI bilateral [Auricles Well Formed] : auricles well formed [Clear Tympanic membranes with present light reflex and bony landmarks] : clear tympanic membranes with present light reflex and bony landmarks [No Discharge] : no discharge [Nares Patent] : nares patent [Pink Nasal Mucosa] : pink nasal mucosa [Palate Intact] : palate intact [Uvula Midline] : uvula midline [Nonerythematous Oropharynx] : nonerythematous oropharynx [No Caries] : no caries [Trachea Midline] : trachea midline [Supple, full passive range of motion] : supple, full passive range of motion [No Palpable Masses] : no palpable masses [Symmetric Chest Rise] : symmetric chest rise [Clear to Auscultation Bilaterally] : clear to auscultation bilaterally [Normoactive Precordium] : normoactive precordium [Regular Rate and Rhythm] : regular rate and rhythm [Normal S1, S2 present] : normal S1, S2 present [No Murmurs] : no murmurs [+2 Femoral Pulses] : +2 femoral pulses [Soft] : soft [NonTender] : non tender [Non Distended] : non distended [Normoactive Bowel Sounds] : normoactive bowel sounds [No Hepatomegaly] : no hepatomegaly [No Splenomegaly] : no splenomegaly [Tobi 1] : Tobi 1 [No Clitoromegaly] : no clitoromegaly [Normal Vagina Introitus] : normal vagina introitus [Patent] : patent [Normally Placed] : normally placed [No Abnormal Lymph Nodes Palpated] : no abnormal lymph nodes palpated [Symmetric Buttocks Creases] : symmetric buttocks creases [Symmetric Hip Rotation] : symmetric hip rotation [No Gait Asymmetry] : no gait asymmetry [No pain or deformities with palpation of bone, muscles, joints] : no pain or deformities with palpation of bone, muscles, joints [Normal Muscle Tone] : normal muscle tone [No Spinal Dimple] : no spinal dimple [NoTuft of Hair] : no tuft of hair [Straight] : straight [+2 Patella DTR] : +2 patella DTR [Cranial Nerves Grossly Intact] : cranial nerves grossly intact [No Rash or Lesions] : no rash or lesions dry

## 2022-03-14 ENCOUNTER — EMERGENCY (EMERGENCY)
Facility: HOSPITAL | Age: 82
LOS: 1 days | Discharge: HOME | End: 2022-03-14
Attending: EMERGENCY MEDICINE | Admitting: EMERGENCY MEDICINE
Payer: COMMERCIAL

## 2022-03-14 ENCOUNTER — NON-APPOINTMENT (OUTPATIENT)
Age: 82
End: 2022-03-14

## 2022-03-14 VITALS
SYSTOLIC BLOOD PRESSURE: 93 MMHG | OXYGEN SATURATION: 99 % | HEIGHT: 71 IN | RESPIRATION RATE: 18 BRPM | WEIGHT: 212.08 LBS | DIASTOLIC BLOOD PRESSURE: 66 MMHG | TEMPERATURE: 98 F | HEART RATE: 84 BPM

## 2022-03-14 VITALS
TEMPERATURE: 98 F | RESPIRATION RATE: 20 BRPM | OXYGEN SATURATION: 97 % | SYSTOLIC BLOOD PRESSURE: 101 MMHG | DIASTOLIC BLOOD PRESSURE: 59 MMHG | HEART RATE: 69 BPM

## 2022-03-14 DIAGNOSIS — Z95.5 PRESENCE OF CORONARY ANGIOPLASTY IMPLANT AND GRAFT: Chronic | ICD-10-CM

## 2022-03-14 DIAGNOSIS — T14.8XXA OTHER INJURY OF UNSPECIFIED BODY REGION, INITIAL ENCOUNTER: Chronic | ICD-10-CM

## 2022-03-14 LAB
ALBUMIN SERPL ELPH-MCNC: 3.7 G/DL — SIGNIFICANT CHANGE UP (ref 3.3–5)
ALP SERPL-CCNC: 103 U/L — SIGNIFICANT CHANGE UP (ref 40–120)
ALT FLD-CCNC: 18 U/L — SIGNIFICANT CHANGE UP (ref 12–78)
AST SERPL-CCNC: 23 U/L — SIGNIFICANT CHANGE UP (ref 15–37)
BASOPHILS # BLD AUTO: 0.02 K/UL — SIGNIFICANT CHANGE UP (ref 0–0.2)
BASOPHILS NFR BLD AUTO: 0.3 % — SIGNIFICANT CHANGE UP (ref 0–2)
BILIRUB SERPL-MCNC: 0.4 MG/DL — SIGNIFICANT CHANGE UP (ref 0.2–1.2)
BUN SERPL-MCNC: 29 MG/DL — SIGNIFICANT CHANGE UP (ref 7–23)
CALCIUM SERPL-MCNC: 9.8 MG/DL — SIGNIFICANT CHANGE UP (ref 8.5–10.1)
CHLORIDE SERPL-SCNC: 107 MMOL/L — SIGNIFICANT CHANGE UP (ref 96–108)
CO2 SERPL-SCNC: 25 MMOL/L — SIGNIFICANT CHANGE UP (ref 22–31)
CREAT SERPL-MCNC: 1.6 MG/DL — SIGNIFICANT CHANGE UP (ref 0.5–1.3)
EOSINOPHIL # BLD AUTO: 0.02 K/UL — SIGNIFICANT CHANGE UP (ref 0–0.5)
EOSINOPHIL NFR BLD AUTO: 0.3 % — SIGNIFICANT CHANGE UP (ref 0–6)
GLUCOSE SERPL-MCNC: 180 MG/DL — SIGNIFICANT CHANGE UP (ref 70–99)
HCT VFR BLD CALC: 43.5 % — SIGNIFICANT CHANGE UP (ref 39–50)
HGB BLD-MCNC: 13.5 G/DL — SIGNIFICANT CHANGE UP (ref 13–17)
IMM GRANULOCYTES NFR BLD AUTO: 0.4 % — SIGNIFICANT CHANGE UP (ref 0–1.5)
LYMPHOCYTES # BLD AUTO: 1.15 K/UL — SIGNIFICANT CHANGE UP (ref 1–3.3)
LYMPHOCYTES # BLD AUTO: 16.3 % — SIGNIFICANT CHANGE UP (ref 13–44)
MCHC RBC-ENTMCNC: 26.5 PG — LOW (ref 27–34)
MCHC RBC-ENTMCNC: 31 GM/DL — LOW (ref 32–36)
MCV RBC AUTO: 85.3 FL — SIGNIFICANT CHANGE UP (ref 80–100)
MONOCYTES # BLD AUTO: 0.76 K/UL — SIGNIFICANT CHANGE UP (ref 0–0.9)
MONOCYTES NFR BLD AUTO: 10.8 % — SIGNIFICANT CHANGE UP (ref 2–14)
NEUTROPHILS # BLD AUTO: 5.08 K/UL — SIGNIFICANT CHANGE UP (ref 1.8–7.4)
NEUTROPHILS NFR BLD AUTO: 71.9 % — SIGNIFICANT CHANGE UP (ref 43–77)
NRBC # BLD: 0 /100 WBCS — SIGNIFICANT CHANGE UP (ref 0–0)
PLATELET # BLD AUTO: 291 K/UL — SIGNIFICANT CHANGE UP (ref 150–400)
POTASSIUM SERPL-MCNC: 5.2 MMOL/L — SIGNIFICANT CHANGE UP (ref 3.5–5.3)
POTASSIUM SERPL-SCNC: 5.2 MMOL/L — SIGNIFICANT CHANGE UP (ref 3.5–5.3)
PROT SERPL-MCNC: 6.9 G/DL — SIGNIFICANT CHANGE UP (ref 6–8.3)
RBC # BLD: 5.1 M/UL — SIGNIFICANT CHANGE UP (ref 4.2–5.8)
RBC # FLD: 13.3 % — SIGNIFICANT CHANGE UP (ref 10.3–14.5)
SODIUM SERPL-SCNC: 140 MMOL/L — SIGNIFICANT CHANGE UP (ref 135–145)
WBC # BLD: 7.06 K/UL — SIGNIFICANT CHANGE UP (ref 3.8–10.5)
WBC # FLD AUTO: 7.06 K/UL — SIGNIFICANT CHANGE UP (ref 3.8–10.5)

## 2022-03-14 PROCEDURE — 87070 CULTURE OTHR SPECIMN AEROBIC: CPT

## 2022-03-14 PROCEDURE — 73701 CT LOWER EXTREMITY W/DYE: CPT | Mod: 26,LT,MA

## 2022-03-14 PROCEDURE — 80053 COMPREHEN METABOLIC PANEL: CPT

## 2022-03-14 PROCEDURE — 99285 EMERGENCY DEPT VISIT HI MDM: CPT

## 2022-03-14 PROCEDURE — 85025 COMPLETE CBC W/AUTO DIFF WBC: CPT

## 2022-03-14 PROCEDURE — 99284 EMERGENCY DEPT VISIT MOD MDM: CPT | Mod: 25

## 2022-03-14 PROCEDURE — 36415 COLL VENOUS BLD VENIPUNCTURE: CPT

## 2022-03-14 PROCEDURE — 73701 CT LOWER EXTREMITY W/DYE: CPT | Mod: MA

## 2022-03-14 RX ORDER — SODIUM CHLORIDE 9 MG/ML
1000 INJECTION INTRAMUSCULAR; INTRAVENOUS; SUBCUTANEOUS ONCE
Refills: 0 | Status: COMPLETED | OUTPATIENT
Start: 2022-03-14 | End: 2022-03-14

## 2022-03-14 RX ADMIN — SODIUM CHLORIDE 1000 MILLILITER(S): 9 INJECTION INTRAMUSCULAR; INTRAVENOUS; SUBCUTANEOUS at 17:30

## 2022-03-14 NOTE — ED PROVIDER NOTE - SKIN, MLM
approximately 1-2 cm opening of previous incision  to left lateral thigh with scant amount of whitish/yellowish drainage. no redness or warmth. no induration or fluctuance

## 2022-03-14 NOTE — ED PROVIDER NOTE - CLINICAL SUMMARY MEDICAL DECISION MAKING FREE TEXT BOX
drainage from left thigh 3-4 days. recent admission and receiving IV for osteomyelitis and abscess. scant amount of drainage from previous incision site. no pain or fevers. per ID recommendations, will check labs, wound culture, CT femur with IV contrast to eval for abscess

## 2022-03-14 NOTE — ED PROVIDER NOTE - ATTENDING CONTRIBUTION TO CARE
80 yo M p/w sp recent osteomyelitis L leg p/w drainage from L leg wound. No fever/chills.  NO acute pain to area. No v/d. No neck / back pain. no cough/ uri. NO other acute pain. no other acute co or changes.  Pt vaccinated for covid,   exam: MM Moist.  neck supple. no meningeal signs. abd soft NT. no hsm. no cvat. Nl non-focal neuro exam. CTA bl, no w/r/r. no acc muscle use. No c/c/e. L leg with 2cm wound with no active discharge, no erythema / warmth. Nl dist str/sens equal bl, 2+ pulses. nl cap refill.  check labs, XR, CT, outpt fu

## 2022-03-14 NOTE — ED PROVIDER NOTE - OBJECTIVE STATEMENT
81 year old male with history of PAT, PVD, HTN, COPD (on 2 liters at home), osteomyelitis, HLD, CAD, and DM presents with drainage from left thigh for past 3-4 days. no pain. no fevers. no redness. reports past femur infection and admitted Dec 2021. discharged home in Dec on IV abx (PICC line was placed) and drain to left thigh. home nursing pulled the drain and PICC line Jan 26th. overall has been doing well per patient. noticed some drainage from the previous wound 3-4 days ago. called their ID specialist (Severiano) and told to come to ED to be evaluated (was supposed to come this am when Dr. العلي was in hospital but has appt for routine blood work by PCP so went there first to have the fasting blood done).   PCP Sarah Berman

## 2022-03-14 NOTE — ED ADULT NURSE NOTE - OBJECTIVE STATEMENT
Patient arrives alert and oriented c/o possible left thigh infection. Patient arrives to bed by w/c accompanied by wife. Patient reports past hx of osteomyelitis with treatment. Patient has noted purulent discharge coming from left lateral thigh wound. Patient afebrile, on 3L NC baseline with exertion, no O2 when stagnant. Patient has + ROM, speaking full sentences. ID MD Dr. Castellon aware. Pch[place]wue[place] RACHEL

## 2022-03-14 NOTE — ED ADULT NURSE NOTE - NSICDXPASTMEDICALHX_GEN_ALL_CORE_FT
PAST MEDICAL HISTORY:  CAD (Coronary Artery Disease) s/p 3v CABG 2004; stents placed in [place] in 2019    COPD (chronic obstructive pulmonary disease) on 2L at home and BiPAP at night; intubated 6/18    Diabetes Mellitus, Type II     Dyslipidemia     History of PAT (paroxysmal atrial tachycardia)     Hypertension     Osteomyelitis     PVD (peripheral vascular disease)

## 2022-03-14 NOTE — ED PROVIDER NOTE - CARE PROVIDER_API CALL
Brandi العلي)  Infectious Disease; Internal Medicine  [address] [place] [address]  Phone: [phone]  Fax: [phone]  Follow Up Time: 1-3 Days

## 2022-03-14 NOTE — ED PROVIDER NOTE - PATIENT PORTAL LINK FT
You can access the FollowMyHealth Patient Portal offered by [facility] by registering at the following website: [email or web address]. By joining [facility]’s FollowMyHealth portal, you will also be able to view your health information using other applications (apps) compatible with our system.

## 2022-03-14 NOTE — ED PROVIDER NOTE - PROGRESS NOTE DETAILS
spoke with Dr. Berman (ID specialist). recommends labs and CT of femur with IV contrast to eval for abscess. if no elevated white count and no abscess, appropriate for discharge without abx and will follow up in office this week in office. will obtain wound culture and she will follow up with results in office labs and CT results discussed with patient and wife. CT results discussed with Dr. Berman. states appropriate for discharge, no fever or leukocytosis. does not need abx at this time. wound culture pending, she will follow up with results and decide on potential abx. will follow up in office 3 days. return precautions explained

## 2022-03-14 NOTE — ED PROVIDER NOTE - CARE PLAN
Principal Discharge DX:	Osteomyelitis of femur  Secondary Diagnosis:	Subperiosteal abscess of left femur   1

## 2022-03-14 NOTE — ED PROVIDER NOTE - NSFOLLOWUPINSTRUCTIONS_ED_ALL_ED_FT
elevate  follow up with ID specialist 3 days  return for fever, increased redness, or increased pain     Osteomyelitis    WHAT YOU NEED TO KNOW:    Osteomyelitis is a severe bone infection. It can develop in any bone, but often involves the long bones, such as your arm and leg bones, or the bones of the spine. Osteomyelitis is caused by different types of germs, such as bacteria or a fungus.    DISCHARGE INSTRUCTIONS:    Return to the emergency department if:   •You have severe pain.      •Your bone breaks.      Call your doctor or orthopedist if:   •Your symptoms return.      •You have increased swelling, pain, or redness of your infected area.      •You have new drainage or an odor from your wound.      •You have questions or concerns about your condition or care.      Medicines: You may need any of the following:  •Prescription pain medicine may be given. Ask your healthcare provider how to take this medicine safely. Some prescription pain medicines contain acetaminophen. Do not take other medicines that contain acetaminophen without talking to your healthcare provider. Too much acetaminophen may cause liver damage. Prescription pain medicine may cause constipation. Ask your healthcare provider how to prevent or treat constipation.       •Antibiotics help treat or prevent a bacterial infection.      •Antifungals help treat or prevent a fungal infection.      •Acetaminophen decreases pain and fever. It is available without a doctor's order. Ask how much to take and how often to take it. Follow directions. Read the labels of all other medicines you are using to see if they also contain acetaminophen, or ask your doctor or pharmacist. Acetaminophen can cause liver damage if not taken correctly. Do not use more than 4 grams (4,000 milligrams) total of acetaminophen in one day.       •Take your medicine as directed. Contact your healthcare provider if you think your medicine is not helping or if you have side effects. Tell him or her if you are allergic to any medicine. Keep a list of the medicines, vitamins, and herbs you take. Include the amounts, and when and why you take them. Bring the list or the pill bottles to follow-up visits. Carry your medicine list with you in case of an emergency.      Rest and immobilize: You may need to rest and wear a splint to help your bone heal. A splint will prevent your bone from moving. Keep weight off of your leg by using crutches, a cane, or walker as directed. Ask your healthcare provider for more information about splints and when you can return to your normal activities.    Eat a variety of healthy foods: Healthy foods include fruits, vegetables, whole-grain breads, low-fat dairy products, beans, lean meats, and fish. Healthy foods will help you heal. Ask if you need to be on a special diet.    Healthy Foods         Do not smoke: Nicotine and other chemicals in cigarettes and cigars can cause lung damage and prevent healing. Ask your healthcare provider for information if you currently smoke and need help to quit. E-cigarettes or smokeless tobacco still contain nicotine. Talk to your healthcare provider before you use these products. If you smoke, it is never too late to quit. Ask your healthcare provider for information if you need help quitting.    Control other medical conditions: Control other medical conditions, such as diabetes, to prevent more bone damage. It is hard to get rid of an infection if your blood sugars are high.    Follow up with your doctor or orthopedist as directed: You may need to return for more blood tests or x-rays. Write down your questions so you remember to ask them during your visits.       © Copyright [employer] 2022

## 2022-03-16 ENCOUNTER — NON-APPOINTMENT (OUTPATIENT)
Age: 82
End: 2022-03-16

## 2022-03-17 ENCOUNTER — APPOINTMENT (OUTPATIENT)
Dept: INFECTIOUS DISEASE | Facility: CLINIC | Age: 82
End: 2022-03-17
Payer: MEDICARE

## 2022-03-17 VITALS
SYSTOLIC BLOOD PRESSURE: 113 MMHG | HEIGHT: 70 IN | OXYGEN SATURATION: 97 % | DIASTOLIC BLOOD PRESSURE: 67 MMHG | TEMPERATURE: 98.7 F | BODY MASS INDEX: 30.35 KG/M2 | WEIGHT: 212 LBS | HEART RATE: 93 BPM

## 2022-03-17 PROCEDURE — 99214 OFFICE O/P EST MOD 30 MIN: CPT

## 2022-03-17 NOTE — PHYSICAL EXAM
[General Appearance - Alert] : alert [General Appearance - In No Acute Distress] : in no acute distress [Sclera] : the sclera and conjunctiva were normal [PERRL With Normal Accommodation] : pupils were equal in size, round, reactive to light [Extraocular Movements] : extraocular movements were intact [Outer Ear] : the ears and nose were normal in appearance [Oropharynx] : the oropharynx was normal with no thrush [Auscultation Breath Sounds / Voice Sounds] : lungs were clear to auscultation bilaterally [Heart Rate And Rhythm] : heart rate was normal and rhythm regular [Heart Sounds] : normal S1 and S2 [Heart Sounds Gallop] : no gallops [Murmurs] : no murmurs [Heart Sounds Pericardial Friction Rub] : no pericardial rub [Full Pulse] : the pedal pulses are present [Edema] : there was no peripheral edema [Bowel Sounds] : normal bowel sounds [Abdomen Soft] : soft [Abdomen Tenderness] : non-tender [] : no hepato-splenomegaly [Abdomen Mass (___ Cm)] : no abdominal mass palpated [Costovertebral Angle Tenderness] : no CVA tenderness [No Palpable Adenopathy] : no palpable adenopathy [FreeTextEntry1] : left thigh scar in the middle is open with some serous fluid. No warmth, swelling or fluctuation, no erythema or tenderness.  [Deep Tendon Reflexes (DTR)] : deep tendon reflexes were 2+ and symmetric [Sensation] : the sensory exam was normal to light touch and pinprick [No Focal Deficits] : no focal deficits [Oriented To Time, Place, And Person] : oriented to person, place, and time [Affect] : the affect was normal

## 2022-03-17 NOTE — ASSESSMENT
No bending, lifting, or strenuous activity x 1 week  Vaseline soaked cotton ball in nose 4 times daily for 4 days, then vaseline in nares at night  Avoid NSAIDs  Saline sprays to bilateral nares hourly throughout the day  Humidifier by bed at night  Use Afrin spray and apply pressure if bleeding recurs  Follow up in 6 weeks  Call for questions, concerns, or recurrence of bleeding prior to follow up   [FreeTextEntry1] : Will send another culture from the area, I wouldn't start any antibiotics at this time. \par Advised to go to ED if any fever or any new symptoms or pain and swelling on left thigh.\par The area has a very complex anatomy, ortho doesn't recommend any surgery. \par I don't think it is appropriate at this time to start suppressive therapy, since all culturs negative and this has happened few times in years. Whenever has collections need to be drained in the hospital and send for cutlure and treat accordingly.\par Staring ABx in his age can have side effects and cause resistant and limit out options in the future. \par \par Patient was given the opportunity to ask questions and all questions were answered to their satisfaction.\par Counseling included lab results, differential diagnosis, treatment options, risks and benefits, lifestyle changes, current condition, medications and dose adjustments.\par The patient verbalized understanding.\par  [Treatment Education] : treatment education [Risk Reduction] : risk reduction [Universal Precautions] : universal precautions [Anticipatory Guidance] : anticipatory guidance

## 2022-03-17 NOTE — HISTORY OF PRESENT ILLNESS
[FreeTextEntry1] : 82yo man with PMH of HTN, COPD on home O2, CAD s/p CABG, PVD s/p stents in b/l\par legs and left femoral fracture more than 50 years ago, was admitted at [facility] in 12/2021 with left\par leg pain on the site of old fracture after CT showed possible intramedullary\par abscess. He had pain and infection in the old fracture area at least 3-4\par times in the past, had multiple surgeries and drainage of area with a long course of\par antibiotic treatment, last one years ago.\par Doppler neg for DVTs\par Admission WBC normal, no fever\par Blood cultures NGTD\par MRI showed collection, intraosseous abscess\par S/P IR drain placement on 12/27\par IR culture NGTD unclear if due to ABx use prior to drainage?\par Completed 6 weeks of ceftriaxone 2gm with PICC line\par No side effect from antibitoics. \par \par Seen by orhto and drain was removed about one week ago they noted that there is discahrge from the drain site. \par He went to ED and had repeat CT that showed size of collection is smaller and culture done that is negative. CBC also normal and he didn't have any fever or any other complaint. \par Today also has small serous fluid from he area, no sign of cellulitis on scar (last time in the hospital had a large fluctuating mass).

## 2022-03-18 ENCOUNTER — LABORATORY RESULT (OUTPATIENT)
Age: 82
End: 2022-03-18

## 2022-03-19 LAB
CULTURE RESULTS: NO GROWTH — SIGNIFICANT CHANGE UP
SPECIMEN SOURCE: SIGNIFICANT CHANGE UP

## 2022-03-19 NOTE — ED ADULT NURSE NOTE - ED STAT RN HANDOFF DETAILS 2
s/p washout, repair of laceration  -keep dry for 2 days  -bacitracin TID/xeroform to sites TID  -abx x 1 week  -f/u in office in 1 week  -informed to call if pain radiating to upper extremity or redness or more pain. 
Report given to RACHEL Villareal

## 2022-03-21 NOTE — PATIENT PROFILE ADULT - LEGAL HELP
Monitor: The problem is unchanged.  Evaluation: Labs/tests ordered, see encounter summary.  Assessment/Treatment:  Continue current treatment/monitoring regimen.   no

## 2022-03-22 ENCOUNTER — NON-APPOINTMENT (OUTPATIENT)
Age: 82
End: 2022-03-22

## 2022-04-01 NOTE — PATIENT PROFILE ADULT - NSSCCAGEALCOHOLCUTDOWN_GEN_A_NUR
Royce Rene has requested a refill of ferrous sulfate 325 (65 Fe) mg tablet  Pt does not meet the requirements placed by [facility]  Would a refill be appropriate? no

## 2022-04-11 ENCOUNTER — APPOINTMENT (OUTPATIENT)
Dept: VASCULAR SURGERY | Facility: CLINIC | Age: 82
End: 2022-04-11
Payer: MEDICARE

## 2022-04-11 VITALS
HEART RATE: 92 BPM | HEIGHT: 70 IN | SYSTOLIC BLOOD PRESSURE: 115 MMHG | DIASTOLIC BLOOD PRESSURE: 72 MMHG | BODY MASS INDEX: 30.35 KG/M2 | WEIGHT: 212 LBS

## 2022-04-11 PROCEDURE — 93925 LOWER EXTREMITY STUDY: CPT

## 2022-04-11 PROCEDURE — 99214 OFFICE O/P EST MOD 30 MIN: CPT

## 2022-04-11 RX ORDER — FLASH GLUCOSE SCANNING READER
EACH MISCELLANEOUS
Qty: 1 | Refills: 0 | Status: ACTIVE | COMMUNITY
Start: 2022-03-24

## 2022-04-11 RX ORDER — FLASH GLUCOSE SENSOR
KIT MISCELLANEOUS
Qty: 2 | Refills: 0 | Status: ACTIVE | COMMUNITY
Start: 2022-03-24

## 2022-04-11 NOTE — ASSESSMENT
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  \par Continue Eliquis and Asa\par \par Patient is symptoms remain stable.  Continue conservative management.

## 2022-04-11 NOTE — HISTORY OF PRESENT ILLNESS
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  No significant symptoms of rest pain or tissue loss.

## 2022-04-14 ENCOUNTER — APPOINTMENT (OUTPATIENT)
Dept: INFECTIOUS DISEASE | Facility: CLINIC | Age: 82
End: 2022-04-14
Payer: MEDICARE

## 2022-04-14 VITALS
DIASTOLIC BLOOD PRESSURE: 60 MMHG | SYSTOLIC BLOOD PRESSURE: 122 MMHG | WEIGHT: 219 LBS | HEIGHT: 70 IN | RESPIRATION RATE: 16 BRPM | TEMPERATURE: 97.5 F | BODY MASS INDEX: 31.35 KG/M2 | OXYGEN SATURATION: 98 %

## 2022-04-14 PROCEDURE — 99213 OFFICE O/P EST LOW 20 MIN: CPT

## 2022-04-14 NOTE — PHYSICAL EXAM
[General Appearance - Alert] : alert [General Appearance - In No Acute Distress] : in no acute distress [Sclera] : the sclera and conjunctiva were normal [PERRL With Normal Accommodation] : pupils were equal in size, round, reactive to light [Extraocular Movements] : extraocular movements were intact [Outer Ear] : the ears and nose were normal in appearance [Oropharynx] : the oropharynx was normal with no thrush [Auscultation Breath Sounds / Voice Sounds] : lungs were clear to auscultation bilaterally [Heart Rate And Rhythm] : heart rate was normal and rhythm regular [Heart Sounds] : normal S1 and S2 [Heart Sounds Gallop] : no gallops [Murmurs] : no murmurs [Heart Sounds Pericardial Friction Rub] : no pericardial rub [Full Pulse] : the pedal pulses are present [Edema] : there was no peripheral edema [Bowel Sounds] : normal bowel sounds [Abdomen Soft] : soft [Abdomen Tenderness] : non-tender [] : no hepato-splenomegaly [Abdomen Mass (___ Cm)] : no abdominal mass palpated [Costovertebral Angle Tenderness] : no CVA tenderness [No Palpable Adenopathy] : no palpable adenopathy [Deep Tendon Reflexes (DTR)] : deep tendon reflexes were 2+ and symmetric [Sensation] : the sensory exam was normal to light touch and pinprick [No Focal Deficits] : no focal deficits [Oriented To Time, Place, And Person] : oriented to person, place, and time [Affect] : the affect was normal [FreeTextEntry1] : less discharge from wound. almost closed.

## 2022-04-14 NOTE — HISTORY OF PRESENT ILLNESS
[FreeTextEntry1] : 82yo man with PMH of HTN, COPD on home O2, CAD s/p CABG, PVD s/p stents in b/l\par legs and left femoral fracture more than 50 years ago, was admitted at [facility] in 12/2021 with left\par leg pain on the site of old fracture after CT showed possible intramedullary\par abscess. He had pain and infection in the old fracture area at least 3-4\par times in the past, had multiple surgeries and drainage of area with a long course of\par antibiotic treatment, last one years ago.\par Doppler neg for DVTs\par Admission WBC normal, no fever\par Blood cultures NGTD\par MRI showed collection, intraosseous abscess\par S/P IR drain placement on 12/27\par IR culture NGTD unclear if due to ABx use prior to drainage?\par Completed 6 weeks of ceftriaxone 2gm with PICC line\par \par He was seen in the clinic and was started on ciprofloxacin for good oral bioavailability and also based in the new culture with a sensitive Enterobacter. He has no diarrhea or any other side effects. \par Today states that wound is almost closing and has less dischage. No pain or fever. \par

## 2022-04-14 NOTE — ASSESSMENT
[Treatment Education] : treatment education [Treatment Adherence] : treatment adherence [Rx Dose / Side Effects] : Rx dose/side effects [Risk Reduction] : risk reduction [Universal Precautions] : universal precautions [Drug Interactions / Side Effects] : drug interactions/side effects [Disclosure of Diagnosis] : disclosure of diagnosis [Anticipatory Guidance] : anticipatory guidance [FreeTextEntry1] : Will leave him on Ciprofloxacin 500mg q12 for suppressive measures. No side effects.\par Discussed about c diff and ABx associated diarrhea. Will stop if any.\par Will make an appt in wound center for him to follow with wound care and me. \par Has appt with ortho this afternoon. \par \par Patient was given the opportunity to ask questions and all questions were answered to their satisfaction.\par Counseling included lab results, differential diagnosis, treatment options, risks and benefits, lifestyle changes, current condition, medications and dose adjustments.\par The patient verbalized understanding.\par

## 2022-04-18 ENCOUNTER — APPOINTMENT (OUTPATIENT)
Dept: VASCULAR SURGERY | Facility: CLINIC | Age: 82
End: 2022-04-18

## 2022-04-20 ENCOUNTER — APPOINTMENT (OUTPATIENT)
Dept: WOUND CARE | Facility: HOSPITAL | Age: 82
End: 2022-04-20
Payer: MEDICARE

## 2022-04-20 ENCOUNTER — OUTPATIENT (OUTPATIENT)
Dept: OUTPATIENT SERVICES | Facility: HOSPITAL | Age: 82
LOS: 1 days | Discharge: HOME | End: 2022-04-20
Payer: COMMERCIAL

## 2022-04-20 VITALS
TEMPERATURE: 97 F | RESPIRATION RATE: 20 BRPM | WEIGHT: 219 LBS | DIASTOLIC BLOOD PRESSURE: 67 MMHG | BODY MASS INDEX: 31.35 KG/M2 | HEIGHT: 70 IN | SYSTOLIC BLOOD PRESSURE: 106 MMHG | OXYGEN SATURATION: 99 % | HEART RATE: 95 BPM

## 2022-04-20 DIAGNOSIS — Z95.5 PRESENCE OF CORONARY ANGIOPLASTY IMPLANT AND GRAFT: Chronic | ICD-10-CM

## 2022-04-20 DIAGNOSIS — Z78.9 OTHER SPECIFIED HEALTH STATUS: ICD-10-CM

## 2022-04-20 DIAGNOSIS — E78.00 PURE HYPERCHOLESTEROLEMIA, UNSPECIFIED: ICD-10-CM

## 2022-04-20 DIAGNOSIS — S71.109A UNSPECIFIED OPEN WOUND, UNSPECIFIED THIGH, INITIAL ENCOUNTER: ICD-10-CM

## 2022-04-20 DIAGNOSIS — T14.8XXA OTHER INJURY OF UNSPECIFIED BODY REGION, INITIAL ENCOUNTER: Chronic | ICD-10-CM

## 2022-04-20 DIAGNOSIS — Z80.0 FAMILY HISTORY OF MALIGNANT NEOPLASM OF DIGESTIVE ORGANS: ICD-10-CM

## 2022-04-20 PROCEDURE — G0463: CPT

## 2022-04-20 PROCEDURE — 87070 CULTURE OTHR SPECIMN AEROBIC: CPT

## 2022-04-20 PROCEDURE — 87186 SC STD MICRODIL/AGAR DIL: CPT

## 2022-04-20 PROCEDURE — 87077 CULTURE AEROBIC IDENTIFY: CPT

## 2022-04-20 PROCEDURE — 99203 OFFICE O/P NEW LOW 30 MIN: CPT

## 2022-04-20 RX ORDER — EMPAGLIFLOZIN 25 MG/1
TABLET, FILM COATED ORAL
Refills: 0 | Status: COMPLETED | COMMUNITY
End: 2022-04-20

## 2022-04-20 NOTE — PHYSICAL EXAM
[Skin Ulcer] : ulcer [Alert] : alert [Oriented to Person] : oriented to person [Oriented to Place] : oriented to place [Oriented to Time] : oriented to time [Calm] : calm [4 x 4] : 4 x 4  [de-identified] : A&Ox3, NAD [de-identified] : on oxygen [de-identified] : left lateral thigh opening down to skin, subcutaneous tissue with minimal amount of purulence [de-identified] : Cultures obtained and sent to lab [FreeTextEntry1] : Left Lateral Thigh Abscess  [FreeTextEntry2] : 1.1 [FreeTextEntry3] : 0.4 [FreeTextEntry4] : 0.5 [de-identified] : small purulent [de-identified] : 100% [de-identified] : None [de-identified] : None [FreeTextEntry5] : None [de-identified] : Alginate Ag ROPE [de-identified] : Mechanically cleansed with sterile gauze and normal saline 0.9%\par Dry Dressing\par  [de-identified] : CIRCULATION\par \par Dorsalis Pedis: R Palpable, Regular, 2+ L Palpable, Regular, 2+\par Posterior Tibialis: R Palpable, Regular, 2+ L Palpable, Regular, 2+\par Extremity Color: Pigmented\par Extremity Temperature: Warm\par Capillary Refill: < 3 seconds bilaterally\par \par \par  [TWNoteComboBox5] : No [TWNoteComboBox6] : Surgical [de-identified] : No [de-identified] : Normal [de-identified] : None [de-identified] : Yes [de-identified] : Cultures obtained [de-identified] : 3x Weekly [de-identified] : Primary Dressing
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
## 2022-04-20 NOTE — PLAN
[FreeTextEntry1] : Patient examined and evaluated at this time.\par Wound packed with 1/4 inch iodoform packing.\par Will obtain repeat CT of the left femur to see progress in healing. Refer to orthopedic physician Lupe.\par Return in 1 week.\par Spent 30min on examination and medical decision making.

## 2022-04-20 NOTE — ASSESSMENT
[] : Yes [Verbal] : Verbal [Written] : Written [Demo] : Demo [Patient] : Patient [Spouse] : Spouse [Good - alert, interested, motivated] : Good - alert, interested, motivated [Demonstrates independently] : demonstrates independently [Dressing changes] : dressing changes [Skin Care] : skin care [Signs and symptoms of infection] : sign and symptoms of infection [Surgery] : surgery [Venous Disease] : venous disease [Nutrition] : nutrition [How and When to Call] : how and when to call [Labs and Tests] : labs and tests [Off-loading] : off-loading [Patient responsibility to plan of care] : patient responsibility to plan of care [Glycemic Control] : glycemic control [Stable] : stable [Home] : Home [Cane] : Cane [Not Applicable - Long Term Care/Home Health Agency] : Long Term Care/Home Health Agency: Not Applicable [FreeTextEntry2] : Infection prevention \par Wound care (dressing changes)\par Maintain optimal skin integrity to high pressure areas\par Nutrition and wound healing\par HBOT\par Culture\par MRI\par Orthopedic Surgeon referral\par Infectious Disease consult [FreeTextEntry3] : Initial Visit [FreeTextEntry4] : Vascular consult submitted. Last Ultrasound completed 4/11/22.. Pt sees Dr. Buckley (Vascular MD), but Dr. Mejia office is too far for the Pt's liking. Consult submitted. Dr. Bran to decide if additional vascular testing is needed.\par Pt has seen the following clinicians' Orthopedics, Infectious Disease,Vascular MD. Since 12/2021, Pt has not f/u with an orthopedist.  would like Pt to see Dr. Farhat More MD ([place], Orthopedics). Pt and Pt's spouse provided information for scheduling.\par MRI ordered by MD. Auth submitted. Pt and Pt's spouse aware to await phone call from radiology dept for scheduling. \par HBOT to be discussed depending on MRI results.\par Infectious Disease consult submitted. \par Pt to f/u in 1 week

## 2022-04-21 ENCOUNTER — NON-APPOINTMENT (OUTPATIENT)
Age: 82
End: 2022-04-21

## 2022-04-21 DIAGNOSIS — Z79.4 LONG TERM (CURRENT) USE OF INSULIN: ICD-10-CM

## 2022-04-21 DIAGNOSIS — J45.50 SEVERE PERSISTENT ASTHMA, UNCOMPLICATED: ICD-10-CM

## 2022-04-21 DIAGNOSIS — Z98.890 OTHER SPECIFIED POSTPROCEDURAL STATES: ICD-10-CM

## 2022-04-21 DIAGNOSIS — Z79.899 OTHER LONG TERM (CURRENT) DRUG THERAPY: ICD-10-CM

## 2022-04-21 DIAGNOSIS — Z95.5 PRESENCE OF CORONARY ANGIOPLASTY IMPLANT AND GRAFT: ICD-10-CM

## 2022-04-21 DIAGNOSIS — I11.0 HYPERTENSIVE HEART DISEASE WITH HEART FAILURE: ICD-10-CM

## 2022-04-21 DIAGNOSIS — Z79.01 LONG TERM (CURRENT) USE OF ANTICOAGULANTS: ICD-10-CM

## 2022-04-21 DIAGNOSIS — M81.0 AGE-RELATED OSTEOPOROSIS WITHOUT CURRENT PATHOLOGICAL FRACTURE: ICD-10-CM

## 2022-04-21 DIAGNOSIS — E78.00 PURE HYPERCHOLESTEROLEMIA, UNSPECIFIED: ICD-10-CM

## 2022-04-21 DIAGNOSIS — L97.112 NON-PRESSURE CHRONIC ULCER OF RIGHT THIGH WITH FAT LAYER EXPOSED: ICD-10-CM

## 2022-04-21 DIAGNOSIS — J44.9 CHRONIC OBSTRUCTIVE PULMONARY DISEASE, UNSPECIFIED: ICD-10-CM

## 2022-04-21 DIAGNOSIS — Z79.02 LONG TERM (CURRENT) USE OF ANTITHROMBOTICS/ANTIPLATELETS: ICD-10-CM

## 2022-04-21 DIAGNOSIS — Z79.2 LONG TERM (CURRENT) USE OF ANTIBIOTICS: ICD-10-CM

## 2022-04-21 DIAGNOSIS — Z95.1 PRESENCE OF AORTOCORONARY BYPASS GRAFT: ICD-10-CM

## 2022-04-21 DIAGNOSIS — Z79.84 LONG TERM (CURRENT) USE OF ORAL HYPOGLYCEMIC DRUGS: ICD-10-CM

## 2022-04-21 DIAGNOSIS — E11.51 TYPE 2 DIABETES MELLITUS WITH DIABETIC PERIPHERAL ANGIOPATHY WITHOUT GANGRENE: ICD-10-CM

## 2022-04-23 LAB
-  AMIKACIN: SIGNIFICANT CHANGE UP
-  AMPICILLIN/SULBACTAM: SIGNIFICANT CHANGE UP
-  AZTREONAM: SIGNIFICANT CHANGE UP
-  CEFAZOLIN: SIGNIFICANT CHANGE UP
-  CEFEPIME: SIGNIFICANT CHANGE UP
-  CEFTAZIDIME: SIGNIFICANT CHANGE UP
-  CIPROFLOXACIN: SIGNIFICANT CHANGE UP
-  CLINDAMYCIN: SIGNIFICANT CHANGE UP
-  ERYTHROMYCIN: SIGNIFICANT CHANGE UP
-  GENTAMICIN: SIGNIFICANT CHANGE UP
-  GENTAMICIN: SIGNIFICANT CHANGE UP
-  IMIPENEM: SIGNIFICANT CHANGE UP
-  LEVOFLOXACIN: SIGNIFICANT CHANGE UP
-  MEROPENEM: SIGNIFICANT CHANGE UP
-  OXACILLIN: SIGNIFICANT CHANGE UP
-  PENICILLIN: SIGNIFICANT CHANGE UP
-  PIPERACILLIN/TAZOBACTAM: SIGNIFICANT CHANGE UP
-  RIFAMPIN: SIGNIFICANT CHANGE UP
-  TETRACYCLINE: SIGNIFICANT CHANGE UP
-  TOBRAMYCIN: SIGNIFICANT CHANGE UP
-  TRIMETHOPRIM/SULFAMETHOXAZOLE: SIGNIFICANT CHANGE UP
-  VANCOMYCIN: SIGNIFICANT CHANGE UP
CULTURE RESULTS: SIGNIFICANT CHANGE UP
METHOD TYPE: SIGNIFICANT CHANGE UP
METHOD TYPE: SIGNIFICANT CHANGE UP
ORGANISM # SPEC MICROSCOPIC CNT: SIGNIFICANT CHANGE UP
SPECIMEN SOURCE: SIGNIFICANT CHANGE UP

## 2022-04-27 ENCOUNTER — APPOINTMENT (OUTPATIENT)
Dept: WOUND CARE | Facility: HOSPITAL | Age: 82
End: 2022-04-27
Payer: MEDICARE

## 2022-04-27 ENCOUNTER — OUTPATIENT (OUTPATIENT)
Dept: OUTPATIENT SERVICES | Facility: HOSPITAL | Age: 82
LOS: 1 days | Discharge: HOME | End: 2022-04-27
Payer: COMMERCIAL

## 2022-04-27 ENCOUNTER — NON-APPOINTMENT (OUTPATIENT)
Age: 82
End: 2022-04-27

## 2022-04-27 VITALS
WEIGHT: 219 LBS | BODY MASS INDEX: 31.35 KG/M2 | RESPIRATION RATE: 19 BRPM | HEIGHT: 70 IN | TEMPERATURE: 98.1 F | OXYGEN SATURATION: 97 % | HEART RATE: 79 BPM | DIASTOLIC BLOOD PRESSURE: 72 MMHG | SYSTOLIC BLOOD PRESSURE: 110 MMHG

## 2022-04-27 DIAGNOSIS — L97.122 NON-PRESSURE CHRONIC ULCER OF LEFT THIGH WITH FAT LAYER EXPOSED: ICD-10-CM

## 2022-04-27 DIAGNOSIS — Z95.5 PRESENCE OF CORONARY ANGIOPLASTY IMPLANT AND GRAFT: Chronic | ICD-10-CM

## 2022-04-27 DIAGNOSIS — T14.8XXA OTHER INJURY OF UNSPECIFIED BODY REGION, INITIAL ENCOUNTER: Chronic | ICD-10-CM

## 2022-04-27 DIAGNOSIS — S71.101A UNSPECIFIED OPEN WOUND, RIGHT THIGH, INITIAL ENCOUNTER: ICD-10-CM

## 2022-04-27 PROCEDURE — G0463: CPT

## 2022-04-27 PROCEDURE — 99213 OFFICE O/P EST LOW 20 MIN: CPT

## 2022-04-27 NOTE — HISTORY OF PRESENT ILLNESS
[FreeTextEntry1] : 82yo man with PMH of HTN, COPD on home O2, CAD s/p CABG, PVD s/p stents in b/l\par legs and left femoral fracture more than 50 years ago, was admitted at [facility] in 12/2021 with left\par leg pain on the site of old fracture after CT showed possible intramedullary\par abscess. He had pain and infection in the old fracture area at least 3-4\par times in the past, had multiple surgeries and drainage of area with a long course of\par antibiotic treatment, last one years ago.\par Doppler neg for DVTs\par Admission WBC normal, no fever\par Blood cultures Negative \par MRI showed collection, intraosseous abscess\par S/P IR drain placement on 12/27\par IR culture NGTD unclear if due to ABx use prior to drainage?\par Completed 6 weeks of ceftriaxone 2gm with PICC line\par \par He was seen in the clinic and was started on ciprofloxacin for good oral bioavailability and also based on the new culture with a sensitive Enterobacter. He has no diarrhea or any other side effects. \par \par today feels fine, no fever, less discharge from leg, no swelling or pain. \par

## 2022-04-27 NOTE — ASSESSMENT
[FreeTextEntry1] : Will continue suppressive ciprofloxacin, side effects discussed again, especially diarrhea and c diff colitis. \par Has appt with ortho tomorrow. Will get MRI of left knee and thigh soon. \par Will continue wound care PRN. \par \par Patient was given the opportunity to ask questions and all questions were answered to their satisfaction.\par Counseling included lab results, differential diagnosis, treatment options, risks and benefits, lifestyle changes, current condition, medications and dose adjustments.\par The patient verbalized understanding.\par  [Treatment Education] : treatment education [Treatment Adherence] : treatment adherence [Rx Dose / Side Effects] : Rx dose/side effects [Risk Reduction] : risk reduction [Nutritional / Food Issues] : nutritional/food issues [Universal Precautions] : universal precautions [Drug Interactions / Side Effects] : drug interactions/side effects

## 2022-04-28 ENCOUNTER — APPOINTMENT (OUTPATIENT)
Dept: ORTHOPEDIC SURGERY | Facility: CLINIC | Age: 82
End: 2022-04-28
Payer: MEDICARE

## 2022-04-28 DIAGNOSIS — M86.659: ICD-10-CM

## 2022-04-28 PROCEDURE — 73552 X-RAY EXAM OF FEMUR 2/>: CPT | Mod: LT

## 2022-04-28 PROCEDURE — 99203 OFFICE O/P NEW LOW 30 MIN: CPT

## 2022-04-28 NOTE — HISTORY OF PRESENT ILLNESS
[FreeTextEntry1] : 80 yo WM, here for f/u of chronic abscesses in his LLE. Being followed by ID, Dr. العلي who has pt on suppressive tx with cipro. A recent culture again showed Enterobacter. Pt seen by Dr. العلي at the [facility] today also. \par    Improvement seen with minimal depth. Pt going for an MRI of the LLE soon.

## 2022-04-28 NOTE — ADDENDUM
[FreeTextEntry1] : I, Gaviota Dejesus wrote this note acting as a scribe for Dr. Farhat More on Apr 28, 2022.

## 2022-04-28 NOTE — PHYSICAL EXAM
[Normal Thyroid] : the thyroid was normal [Normal Breath Sounds] : Normal breath sounds [Normal Heart Sounds] : normal heart sounds [Normal Rate and Rhythm] : normal rate and rhythm [Oriented to Person] : oriented to person [Oriented to Time] : oriented to time [Calm] : calm [4 x 4] : 4 x 4  [JVD] : no jugular venous distention  [Abdomen Masses] : No abdominal massess [Abdomen Tenderness] : ~T ~M No abdominal tenderness [Tender] : nontender [Enlarged] : not enlarged [Alert] : not alert [de-identified] : elderly M, NAD, alert, Ox3. [FreeTextEntry1] : Left Lateral Thigh Abscess [FreeTextEntry2] : 0.8 [FreeTextEntry3] : 0.3 [FreeTextEntry4] : 0.1-0.2 [de-identified] : serosanguineous [de-identified] : Silver Alginate [de-identified] : Mechanically Cleansed with Normal Saline  [TWNoteComboBox4] : Small [TWNoteComboBox5] : Yes [TWNoteComboBox6] : Surgical [de-identified] : No [de-identified] : Normal [de-identified] : None [de-identified] : None [de-identified] : 100% [de-identified] : 3x Weekly [de-identified] : Primary Dressing
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
## 2022-04-28 NOTE — DISCUSSION/SUMMARY
[de-identified] : The underlying pathophysiology was reviewed with the patient. XR films were reviewed with the patient and his wife. Discussed at length the nature of the patient’s condition. \par \par At this time, I discussed continuation of management of the infection with antibiotics. The patient and his wife deferred a wound culture today as they stated, he just had one within the past week by Dr. العلي, his infectious disease physician. No surgery is indicated at this time.This will remain a permanent recurring issue for this patient requiring repeated courses of antibiotic therapy.\par We discussed the importance of controlling his type II diabetes as high glucose levels pose an increased risk for further infections.\par \par All questions answered, understanding verbalized. Patient in agreement with plan of care. Follow up as needed.  [Limb Swelling] : limb swelling [As Noted in HPI] : as noted in HPI [Skin Wound] : skin wound [Negative] : Psychiatric [Fever] : no fever [Chills] : no chills [Body Aches] : no body aches [Feeling Sick] : not feeling sick

## 2022-04-28 NOTE — PHYSICAL EXAM
[de-identified] : Patient is WDWN, alert, and in no acute distress. Breathing is unlabored. He is grossly oriented to person, place, and time.\par \par He is accompanied by his wife today. \par \par Left Lower Extremity:\par Well healed scar along left lateral thigh\par Punctate area of drainage noted\par no erythema\par hip and knee ROM with no pain\par Ambulates with a cane, slight limp.\par  [de-identified] : AP, lateral and 2-oblique views of the left femur were obtained today and revealed a healed midshaft femur fracture.There is a deformity  with areas of lucency whichh are likely sites of chronic osteomyelitis. Medial compartment arthritis in the left knee.\par \par ------------------------------------------------------------------------------------------------------------------------------------------------------------------------------------\par \par EXAM:  CT FEMUR ONLY LT\par PROCEDURE DATE:  01/13/2022\par IMPRESSION:\par Chronic posttraumatic deformity of the mid femoral diaphysis with a deep defect along the lateral cortex. Unchanged areas of lucency within the cranial margin of the defect extending into the intramedullary cavity. This most likely related to active chronic osteomyelitis. Drainage catheter is seen adjacent to the defect with persistent fluid attenuation tracking both cranially and caudally from the level of the defect as outlined above. Fluid attenuation which tracks cranially from the defect also extends into the lateral subcutaneous fat.\par \par AMARILIS TAVARES MD; Attending Radiologist\par This document has been electronically signed. Jan 13 2022  2:57PM

## 2022-04-28 NOTE — END OF VISIT
[FreeTextEntry3] : All medical record entries made by the Scribe were at my,  Dr. Farhat More MD., direction and personally dictated by me on 04/28/2022. I have personally reviewed the chart and agree that the record accurately reflects my personal performance of the history, physical exam, assessment and plan.

## 2022-04-29 DIAGNOSIS — J44.9 CHRONIC OBSTRUCTIVE PULMONARY DISEASE, UNSPECIFIED: ICD-10-CM

## 2022-04-29 DIAGNOSIS — Z79.84 LONG TERM (CURRENT) USE OF ORAL HYPOGLYCEMIC DRUGS: ICD-10-CM

## 2022-04-29 DIAGNOSIS — Z98.890 OTHER SPECIFIED POSTPROCEDURAL STATES: ICD-10-CM

## 2022-04-29 DIAGNOSIS — I50.9 HEART FAILURE, UNSPECIFIED: ICD-10-CM

## 2022-04-29 DIAGNOSIS — Z79.02 LONG TERM (CURRENT) USE OF ANTITHROMBOTICS/ANTIPLATELETS: ICD-10-CM

## 2022-04-29 DIAGNOSIS — Z79.4 LONG TERM (CURRENT) USE OF INSULIN: ICD-10-CM

## 2022-04-29 DIAGNOSIS — J45.50 SEVERE PERSISTENT ASTHMA, UNCOMPLICATED: ICD-10-CM

## 2022-04-29 DIAGNOSIS — Z95.1 PRESENCE OF AORTOCORONARY BYPASS GRAFT: ICD-10-CM

## 2022-04-29 DIAGNOSIS — L97.112 NON-PRESSURE CHRONIC ULCER OF RIGHT THIGH WITH FAT LAYER EXPOSED: ICD-10-CM

## 2022-04-29 DIAGNOSIS — E78.00 PURE HYPERCHOLESTEROLEMIA, UNSPECIFIED: ICD-10-CM

## 2022-04-29 DIAGNOSIS — Z79.01 LONG TERM (CURRENT) USE OF ANTICOAGULANTS: ICD-10-CM

## 2022-04-29 DIAGNOSIS — I11.0 HYPERTENSIVE HEART DISEASE WITH HEART FAILURE: ICD-10-CM

## 2022-04-29 DIAGNOSIS — Z79.899 OTHER LONG TERM (CURRENT) DRUG THERAPY: ICD-10-CM

## 2022-04-29 DIAGNOSIS — E11.51 TYPE 2 DIABETES MELLITUS WITH DIABETIC PERIPHERAL ANGIOPATHY WITHOUT GANGRENE: ICD-10-CM

## 2022-04-29 DIAGNOSIS — Z79.2 LONG TERM (CURRENT) USE OF ANTIBIOTICS: ICD-10-CM

## 2022-04-29 DIAGNOSIS — E11.622 TYPE 2 DIABETES MELLITUS WITH OTHER SKIN ULCER: ICD-10-CM

## 2022-04-29 DIAGNOSIS — M81.0 AGE-RELATED OSTEOPOROSIS WITHOUT CURRENT PATHOLOGICAL FRACTURE: ICD-10-CM

## 2022-04-29 DIAGNOSIS — Z95.5 PRESENCE OF CORONARY ANGIOPLASTY IMPLANT AND GRAFT: ICD-10-CM

## 2022-05-02 ENCOUNTER — APPOINTMENT (OUTPATIENT)
Dept: WOUND CARE | Facility: HOSPITAL | Age: 82
End: 2022-05-02
Payer: MEDICARE

## 2022-05-02 ENCOUNTER — OUTPATIENT (OUTPATIENT)
Dept: OUTPATIENT SERVICES | Facility: HOSPITAL | Age: 82
LOS: 1 days | Discharge: HOME | End: 2022-05-02
Payer: COMMERCIAL

## 2022-05-02 VITALS
HEART RATE: 81 BPM | BODY MASS INDEX: 31.35 KG/M2 | WEIGHT: 219 LBS | RESPIRATION RATE: 20 BRPM | TEMPERATURE: 98.7 F | OXYGEN SATURATION: 94 % | DIASTOLIC BLOOD PRESSURE: 70 MMHG | HEIGHT: 70 IN | SYSTOLIC BLOOD PRESSURE: 123 MMHG

## 2022-05-02 DIAGNOSIS — L97.112 NON-PRESSURE CHRONIC ULCER OF RIGHT THIGH WITH FAT LAYER EXPOSED: ICD-10-CM

## 2022-05-02 DIAGNOSIS — T14.8XXA OTHER INJURY OF UNSPECIFIED BODY REGION, INITIAL ENCOUNTER: Chronic | ICD-10-CM

## 2022-05-02 DIAGNOSIS — Z95.5 PRESENCE OF CORONARY ANGIOPLASTY IMPLANT AND GRAFT: Chronic | ICD-10-CM

## 2022-05-02 PROCEDURE — G0463: CPT

## 2022-05-02 PROCEDURE — 99213 OFFICE O/P EST LOW 20 MIN: CPT

## 2022-05-02 NOTE — ASSESSMENT
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 Remote hx of femmoral stents but he doesn't even walk much now. No LE symptoms.
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
## 2022-05-03 DIAGNOSIS — Z80.0 FAMILY HISTORY OF MALIGNANT NEOPLASM OF DIGESTIVE ORGANS: ICD-10-CM

## 2022-05-03 DIAGNOSIS — Z79.82 LONG TERM (CURRENT) USE OF ASPIRIN: ICD-10-CM

## 2022-05-03 DIAGNOSIS — Z79.899 OTHER LONG TERM (CURRENT) DRUG THERAPY: ICD-10-CM

## 2022-05-03 DIAGNOSIS — Z95.1 PRESENCE OF AORTOCORONARY BYPASS GRAFT: ICD-10-CM

## 2022-05-03 DIAGNOSIS — E11.622 TYPE 2 DIABETES MELLITUS WITH OTHER SKIN ULCER: ICD-10-CM

## 2022-05-03 DIAGNOSIS — M81.0 AGE-RELATED OSTEOPOROSIS WITHOUT CURRENT PATHOLOGICAL FRACTURE: ICD-10-CM

## 2022-05-03 DIAGNOSIS — M86.662 OTHER CHRONIC OSTEOMYELITIS, LEFT TIBIA AND FIBULA: ICD-10-CM

## 2022-05-03 DIAGNOSIS — Z95.5 PRESENCE OF CORONARY ANGIOPLASTY IMPLANT AND GRAFT: ICD-10-CM

## 2022-05-03 DIAGNOSIS — Z79.01 LONG TERM (CURRENT) USE OF ANTICOAGULANTS: ICD-10-CM

## 2022-05-03 DIAGNOSIS — E11.59 TYPE 2 DIABETES MELLITUS WITH OTHER CIRCULATORY COMPLICATIONS: ICD-10-CM

## 2022-05-03 DIAGNOSIS — J44.9 CHRONIC OBSTRUCTIVE PULMONARY DISEASE, UNSPECIFIED: ICD-10-CM

## 2022-05-03 DIAGNOSIS — Z79.51 LONG TERM (CURRENT) USE OF INHALED STEROIDS: ICD-10-CM

## 2022-05-03 DIAGNOSIS — Z79.2 LONG TERM (CURRENT) USE OF ANTIBIOTICS: ICD-10-CM

## 2022-05-03 DIAGNOSIS — E11.69 TYPE 2 DIABETES MELLITUS WITH OTHER SPECIFIED COMPLICATION: ICD-10-CM

## 2022-05-03 DIAGNOSIS — L97.112 NON-PRESSURE CHRONIC ULCER OF RIGHT THIGH WITH FAT LAYER EXPOSED: ICD-10-CM

## 2022-05-03 DIAGNOSIS — Z98.890 OTHER SPECIFIED POSTPROCEDURAL STATES: ICD-10-CM

## 2022-05-03 DIAGNOSIS — Z87.891 PERSONAL HISTORY OF NICOTINE DEPENDENCE: ICD-10-CM

## 2022-05-03 DIAGNOSIS — I25.10 ATHEROSCLEROTIC HEART DISEASE OF NATIVE CORONARY ARTERY WITHOUT ANGINA PECTORIS: ICD-10-CM

## 2022-05-03 DIAGNOSIS — Z87.01 PERSONAL HISTORY OF PNEUMONIA (RECURRENT): ICD-10-CM

## 2022-05-03 DIAGNOSIS — Z80.1 FAMILY HISTORY OF MALIGNANT NEOPLASM OF TRACHEA, BRONCHUS AND LUNG: ICD-10-CM

## 2022-05-03 DIAGNOSIS — Z83.3 FAMILY HISTORY OF DIABETES MELLITUS: ICD-10-CM

## 2022-05-04 NOTE — ED ADULT NURSE NOTE - PRIMARY CARE PROVIDER
Have You Had Botox Before?: has had botox When Was Your Last Botox Treatment?: 01/17/2022 unknown Oleg/pulmonary

## 2022-05-06 ENCOUNTER — APPOINTMENT (OUTPATIENT)
Dept: WOUND CARE | Facility: HOSPITAL | Age: 82
End: 2022-05-06
Payer: MEDICARE

## 2022-05-06 ENCOUNTER — OUTPATIENT (OUTPATIENT)
Dept: OUTPATIENT SERVICES | Facility: HOSPITAL | Age: 82
LOS: 1 days | Discharge: HOME | End: 2022-05-06
Payer: COMMERCIAL

## 2022-05-06 VITALS
BODY MASS INDEX: 31.35 KG/M2 | HEIGHT: 70 IN | DIASTOLIC BLOOD PRESSURE: 83 MMHG | OXYGEN SATURATION: 97 % | TEMPERATURE: 97.9 F | WEIGHT: 219 LBS | RESPIRATION RATE: 20 BRPM | HEART RATE: 84 BPM | SYSTOLIC BLOOD PRESSURE: 150 MMHG

## 2022-05-06 DIAGNOSIS — Z95.5 PRESENCE OF CORONARY ANGIOPLASTY IMPLANT AND GRAFT: Chronic | ICD-10-CM

## 2022-05-06 DIAGNOSIS — S71.101A UNSPECIFIED OPEN WOUND, RIGHT THIGH, INITIAL ENCOUNTER: ICD-10-CM

## 2022-05-06 DIAGNOSIS — T14.8XXA OTHER INJURY OF UNSPECIFIED BODY REGION, INITIAL ENCOUNTER: Chronic | ICD-10-CM

## 2022-05-06 PROCEDURE — G0463: CPT

## 2022-05-06 PROCEDURE — 99213 OFFICE O/P EST LOW 20 MIN: CPT

## 2022-05-06 NOTE — PHYSICAL EXAM
[Normal Thyroid] : the thyroid was normal [Normal Breath Sounds] : Normal breath sounds [Normal Heart Sounds] : normal heart sounds [Normal Rate and Rhythm] : normal rate and rhythm [Alert] : alert [Oriented to Person] : oriented to person [Oriented to Place] : oriented to place [Oriented to Time] : oriented to time [Calm] : calm [2 x 2] : 2 x 2  [JVD] : no jugular venous distention  [Abdomen Masses] : No abdominal massess [Abdomen Tenderness] : ~T ~M No abdominal tenderness [Tender] : nontender [Enlarged] : not enlarged [de-identified] : elderly BM, NAD, alert, Ox3. [FreeTextEntry1] : Left Lateral Thigh - No open wound at time of visit. [de-identified] : none [de-identified] : Alginate Ag [de-identified] : Mechanically cleansed with sterile gauze and normal saline 0.9%\par Dry Dressing\par  [TWNoteComboBox4] : None [TWNoteComboBox5] : No [TWNoteComboBox6] : Other [de-identified] : No [de-identified] : Normal [de-identified] : None [de-identified] : 3x Weekly [de-identified] : Primary Dressing

## 2022-05-06 NOTE — PROGRESS NOTE ADULT - SUBJECTIVE AND OBJECTIVE BOX
Nursing Transfer Note      5/6/2022     Reason patient is being transferred: Stable    Transfer To: [id number]    Transfer via bed    Transfer with NC to O2, cardiac monitoring    Transported by KOURTNEY Benavides RN    Medicines sent: NS at 75ml/hr    Any special needs or follow-up needed: Tele    Chart send with patient: Yes    Notified: daughter    Patient reassessed at: 5/6/2022 1600    Upon arrival to floor: cardiac monitor applied, patient oriented to room, call bell in reach and bed in lowest position  Doris Levy RN in room upon arrival.    Patient seen and examined at bedside. Patient is no sp IR drainage of L femur abscess with residual MELANIA drain placed in L leg. Pain well controlled with medication. Patient denies any numbness, tingling, weakness, or any other orthopaedic complaint. Denies N/V/CP/SOB.               VITAL SIGNS:  T(C): 36.4 (12-29-21 @ 05:30), Max: 36.7 (12-28-21 @ 20:21)  HR: 81 (12-29-21 @ 05:30) (75 - 86)  BP: 104/69 (12-29-21 @ 05:30) (104/69 - 118/72)  RR: 19 (12-29-21 @ 05:30) (18 - 19)  SpO2: 97% (12-29-21 @ 05:30) (93% - 98%)      LABS:                        11.4   5.69  )-----------( 315      ( 29 Dec 2021 08:42 )             37.6     12-29    141  |  106  |  23  ----------------------------<  154<H>  3.8   |  30  |  1.20    Ca    9.9      29 Dec 2021 08:42  Phos  2.5     12-28  Mg     2.2     12-28        Gen: NAD, Resting comfortably, aaox3  LLE  antereolateral surgical scar, well healed. Defect in muscle noted, no drainage. Interval improvement of collection noted superior to surgical scar.   MELANIA drain in place draining SS fluid  No bony tenderness to palpation  +EHL/FHL/TA/GSC  +SILT L3-S1  + DP  Compartments soft and compressible  No calf tenderness    A/P: 81M/F w/ L femur osteomyelitis, abscess S/P IR drainage     Patient's vitals are stable, no indication of sepsis  Medical management appreciated   Pain control PRN  DVT ppx per primary team  C/w antibiotics per ID  IR to manage drains, will FU output, please record   TTWB LLE  No acute orthopaedic surgical intervention indicated at this time. This patient is orthopaedically stable for discharge once medically stable.   Patient to follow up with own Orthopedic Surgeon as an outpatient for further evaluation and management. If unable to make appointment, patient can follow-up with [facility].    All of the patient's questions and concerns were answered and addressed.   Discussed with attending who is in agreement with above plan

## 2022-05-06 NOTE — HISTORY OF PRESENT ILLNESS
[FreeTextEntry1] : 82 yo WM, here for f/u of chronic abscesses involving his left left hip. Being followed by ID, Dr. العلي who has pt on suppressive tx with cipro. A recent culture again showed Enterobacter. Only a small 1mm opening remains and the tissue underneath feels soft and not indurated. Minimal drainage seen.\par      Pt going for an MRI of the LLE soon. \par

## 2022-05-06 NOTE — ASSESSMENT
[Verbal] : Verbal [Written] : Written [Demo] : Demo [Patient] : Patient [Good - alert, interested, motivated] : Good - alert, interested, motivated [Demonstrates independently] : demonstrates independently [Dressing changes] : dressing changes [Skin Care] : skin care [Pressure relief] : pressure relief [Signs and symptoms of infection] : sign and symptoms of infection [Surgery] : surgery [Venous Disease] : venous disease [Nutrition] : nutrition [How and When to Call] : how and when to call [Labs and Tests] : labs and tests [Off-loading] : off-loading [Patient responsibility to plan of care] : patient responsibility to plan of care [Glycemic Control] : glycemic control [] : Yes [Stable] : stable [Home] : Home [Cane] : Cane [Not Applicable - Long Term Care/Home Health Agency] : Long Term Care/Home Health Agency: Not Applicable [FreeTextEntry2] : Infection prevention \par Wound care (dressing changes)\par Maintain optimal skin integrity to high pressure areas\par Nutrition and wound healing\par Offloading the stress on skin structures and decreasing potential pathologic biomechanical influences.\par MRI\par Orthopedic consult\par I.D. Consults [FreeTextEntry3] : Wound is closed at today's visit.  [FreeTextEntry4] : Pt seen by Orthopedic specialist (Dr. More) on 4/28/22. A 2 view Xray was performed on Pt's left femur. Per Orthopedic specialist, no surgery is indicated at this time and to continue with oral abt therapy.\par Pt seen by Dr. Bran on 5/2/22. Pt's CT and MR was reviewed by Vascular specialist. Per vascular specialist, no vascular studies needed and to f/u with ID and Orthopedics. \par To date, Pt has not completed MRI ordered by Dr. Nicolas on 4/20/22. Pt's wife questioning whether or not the MRI is necessary. MRI to be completed per MD. Pt's spouse to call MRI after today's visit to schedule an appt.\par MD requests Pt to see Dr. العلي in 2 weeks. Consult submitted. \par F/u in 2 weeks.

## 2022-05-07 DIAGNOSIS — M81.0 AGE-RELATED OSTEOPOROSIS WITHOUT CURRENT PATHOLOGICAL FRACTURE: ICD-10-CM

## 2022-05-07 DIAGNOSIS — L97.112 NON-PRESSURE CHRONIC ULCER OF RIGHT THIGH WITH FAT LAYER EXPOSED: ICD-10-CM

## 2022-05-07 DIAGNOSIS — Z79.4 LONG TERM (CURRENT) USE OF INSULIN: ICD-10-CM

## 2022-05-07 DIAGNOSIS — Z95.1 PRESENCE OF AORTOCORONARY BYPASS GRAFT: ICD-10-CM

## 2022-05-07 DIAGNOSIS — Z79.84 LONG TERM (CURRENT) USE OF ORAL HYPOGLYCEMIC DRUGS: ICD-10-CM

## 2022-05-07 DIAGNOSIS — E11.622 TYPE 2 DIABETES MELLITUS WITH OTHER SKIN ULCER: ICD-10-CM

## 2022-05-07 DIAGNOSIS — Z95.5 PRESENCE OF CORONARY ANGIOPLASTY IMPLANT AND GRAFT: ICD-10-CM

## 2022-05-07 DIAGNOSIS — E11.51 TYPE 2 DIABETES MELLITUS WITH DIABETIC PERIPHERAL ANGIOPATHY WITHOUT GANGRENE: ICD-10-CM

## 2022-05-07 DIAGNOSIS — E78.00 PURE HYPERCHOLESTEROLEMIA, UNSPECIFIED: ICD-10-CM

## 2022-05-07 DIAGNOSIS — Z79.01 LONG TERM (CURRENT) USE OF ANTICOAGULANTS: ICD-10-CM

## 2022-05-07 DIAGNOSIS — J45.50 SEVERE PERSISTENT ASTHMA, UNCOMPLICATED: ICD-10-CM

## 2022-05-07 DIAGNOSIS — Z79.2 LONG TERM (CURRENT) USE OF ANTIBIOTICS: ICD-10-CM

## 2022-05-07 DIAGNOSIS — I50.9 HEART FAILURE, UNSPECIFIED: ICD-10-CM

## 2022-05-07 DIAGNOSIS — J44.9 CHRONIC OBSTRUCTIVE PULMONARY DISEASE, UNSPECIFIED: ICD-10-CM

## 2022-05-07 DIAGNOSIS — Z79.02 LONG TERM (CURRENT) USE OF ANTITHROMBOTICS/ANTIPLATELETS: ICD-10-CM

## 2022-05-07 DIAGNOSIS — I11.0 HYPERTENSIVE HEART DISEASE WITH HEART FAILURE: ICD-10-CM

## 2022-05-07 DIAGNOSIS — Z79.899 OTHER LONG TERM (CURRENT) DRUG THERAPY: ICD-10-CM

## 2022-05-07 DIAGNOSIS — Z98.890 OTHER SPECIFIED POSTPROCEDURAL STATES: ICD-10-CM

## 2022-05-11 ENCOUNTER — NON-APPOINTMENT (OUTPATIENT)
Age: 82
End: 2022-05-11

## 2022-05-16 ENCOUNTER — OUTPATIENT (OUTPATIENT)
Dept: OUTPATIENT SERVICES | Facility: HOSPITAL | Age: 82
LOS: 1 days | End: 2022-05-16
Payer: COMMERCIAL

## 2022-05-16 DIAGNOSIS — Z95.5 PRESENCE OF CORONARY ANGIOPLASTY IMPLANT AND GRAFT: Chronic | ICD-10-CM

## 2022-05-16 DIAGNOSIS — T14.8XXA OTHER INJURY OF UNSPECIFIED BODY REGION, INITIAL ENCOUNTER: Chronic | ICD-10-CM

## 2022-05-16 DIAGNOSIS — L97.122 NON-PRESSURE CHRONIC ULCER OF LEFT THIGH WITH FAT LAYER EXPOSED: ICD-10-CM

## 2022-05-16 PROCEDURE — 73718 MRI LOWER EXTREMITY W/O DYE: CPT

## 2022-05-16 PROCEDURE — 73718 MRI LOWER EXTREMITY W/O DYE: CPT | Mod: 26,LT

## 2022-05-18 ENCOUNTER — APPOINTMENT (OUTPATIENT)
Dept: PULMONOLOGY | Facility: CLINIC | Age: 82
End: 2022-05-18
Payer: MEDICARE

## 2022-05-18 ENCOUNTER — RX RENEWAL (OUTPATIENT)
Age: 82
End: 2022-05-18

## 2022-05-18 PROCEDURE — 94618 PULMONARY STRESS TESTING: CPT

## 2022-05-18 PROCEDURE — 94726 PLETHYSMOGRAPHY LUNG VOLUMES: CPT

## 2022-05-18 PROCEDURE — 94010 BREATHING CAPACITY TEST: CPT

## 2022-05-18 PROCEDURE — ZZZZZ: CPT

## 2022-05-18 PROCEDURE — 94729 DIFFUSING CAPACITY: CPT

## 2022-05-20 ENCOUNTER — OUTPATIENT (OUTPATIENT)
Dept: OUTPATIENT SERVICES | Facility: HOSPITAL | Age: 82
LOS: 1 days | Discharge: HOME | End: 2022-05-20
Payer: COMMERCIAL

## 2022-05-20 ENCOUNTER — NON-APPOINTMENT (OUTPATIENT)
Age: 82
End: 2022-05-20

## 2022-05-20 ENCOUNTER — APPOINTMENT (OUTPATIENT)
Dept: WOUND CARE | Facility: HOSPITAL | Age: 82
End: 2022-05-20
Payer: MEDICARE

## 2022-05-20 VITALS
OXYGEN SATURATION: 99 % | SYSTOLIC BLOOD PRESSURE: 93 MMHG | HEIGHT: 70 IN | BODY MASS INDEX: 31.35 KG/M2 | TEMPERATURE: 98 F | WEIGHT: 219 LBS | HEART RATE: 76 BPM | DIASTOLIC BLOOD PRESSURE: 60 MMHG | RESPIRATION RATE: 20 BRPM

## 2022-05-20 DIAGNOSIS — T14.8XXA OTHER INJURY OF UNSPECIFIED BODY REGION, INITIAL ENCOUNTER: Chronic | ICD-10-CM

## 2022-05-20 DIAGNOSIS — Z95.5 PRESENCE OF CORONARY ANGIOPLASTY IMPLANT AND GRAFT: Chronic | ICD-10-CM

## 2022-05-20 DIAGNOSIS — S71.101A UNSPECIFIED OPEN WOUND, RIGHT THIGH, INITIAL ENCOUNTER: ICD-10-CM

## 2022-05-20 PROCEDURE — 99213 OFFICE O/P EST LOW 20 MIN: CPT

## 2022-05-20 PROCEDURE — G0463: CPT

## 2022-05-20 NOTE — PHYSICAL EXAM
[Normal Thyroid] : the thyroid was normal [Normal Breath Sounds] : Normal breath sounds [Normal Heart Sounds] : normal heart sounds [Normal Rate and Rhythm] : normal rate and rhythm [Alert] : alert [Oriented to Person] : oriented to person [Oriented to Place] : oriented to place [Oriented to Time] : oriented to time [Calm] : calm [4 x 4] : 4 x 4  [JVD] : no jugular venous distention  [Abdomen Masses] : No abdominal massess [Abdomen Tenderness] : ~T ~M No abdominal tenderness [Tender] : nontender [Enlarged] : not enlarged [de-identified] : elderly WM, NAD, alert, Ox3. [FreeTextEntry1] : Left lateral thigh  [FreeTextEntry2] : 0.1 [FreeTextEntry3] : 0.8 [FreeTextEntry4] : 0.1 [de-identified] : Serous/sanguinous [de-identified] : Silver alginate [de-identified] : Mechanically cleansed with sterile gauze and normal saline.\par Cloth tape  [TWNoteComboBox4] : Small [de-identified] : Normal [de-identified] : None [de-identified] : None [de-identified] : 100% [de-identified] : No [de-identified] : Every other day [de-identified] : Primary Dressing

## 2022-05-20 NOTE — ASSESSMENT
[Verbal] : Verbal [Written] : Written [Demo] : Demo [Patient] : Patient [Spouse] : Spouse [Good - alert, interested, motivated] : Good - alert, interested, motivated [Verbalizes knowledge/Understanding] : Verbalizes knowledge/understanding [Dressing changes] : dressing changes [Skin Care] : skin care [Pressure relief] : pressure relief [Signs and symptoms of infection] : sign and symptoms of infection [Nutrition] : nutrition [How and When to Call] : how and when to call [Labs and Tests] : labs and tests [Pain Management] : pain management [Patient responsibility to plan of care] : patient responsibility to plan of care [] : Yes [Stable] : stable [Home] : Home [Cane] : Cane [Not Applicable - Long Term Care/Home Health Agency] : Long Term Care/Home Health Agency: Not Applicable [FreeTextEntry2] : Infection prevention\par Localized wound care \par Goal remaining pain free regarding wounds\par Pressure relief  [FreeTextEntry4] : MRI reviewed by MD\par ID consult completed \par Cipro refill to be E - scribed \par Per MD patient not candidate for hyperbarics due to pulmonary history \par Follow up in 3 weeks

## 2022-05-20 NOTE — HISTORY OF PRESENT ILLNESS
[FreeTextEntry1] : 83 yo BM, here with his wife for f/u of a chronic, nonhealing left lateral thigh wound. Pt with known large collection in the left lat thigh and according to the pt's wife, no further surgical intervention is planned with his ortho surg, only continued po abx. Pt seen by ID, Dr. العلي today who is planning to keep pt on cipro indefinitely for known osteomyelitis.A recent MRI still showing large 12.9cm fluid and abscess collection within the Left thigh.\par    Small opening with light drainage still present.

## 2022-05-20 NOTE — ASSESSMENT
[FreeTextEntry1] : Will need suppressive therapy indefinitely, will continue cipro. \par Discussed in details about the side effects especially diarrhea and c diff.\par Ortho will not do any surgery (seen on 4/28/22).\par WIll follow in wound care, will be evaluated for HBOT. \par \par Patient was given the opportunity to ask questions and all questions were answered to their satisfaction.\par Counseling included lab results, differential diagnosis, treatment options, risks and benefits, lifestyle changes, current condition, medications and dose adjustments.\par The patient verbalized understanding.\par  [Treatment Education] : treatment education [Treatment Adherence] : treatment adherence [Rx Dose / Side Effects] : Rx dose/side effects [Risk Reduction] : risk reduction [Universal Precautions] : universal precautions [Drug Interactions / Side Effects] : drug interactions/side effects [Anticipatory Guidance] : anticipatory guidance

## 2022-05-20 NOTE — PLAN
[FreeTextEntry1] : ag alginate, DD\par cont cipro as per ID\par f/u 3 wks\par \par time spent 25 mins.

## 2022-05-20 NOTE — HISTORY OF PRESENT ILLNESS
[FreeTextEntry1] : 82yo man with PMH of HTN, COPD on home O2, CAD s/p CABG, PVD s/p stents in b/l\par legs and left femoral fracture more than 50 years ago, was admitted at [facility] in 12/2021 with left\par leg pain on the site of old fracture after CT showed possible intramedullary\par abscess. He had pain and infection in the old fracture area at least 3-4\par times in the past, had multiple surgeries and drainage of area with a long course of\par antibiotic treatment, last one years ago.\par Doppler neg for DVTs\par Admission WBC normal, no fever\par Blood cultures Negative \par MRI showed collection, intraosseous abscess\par S/P IR drain placement on 12/27\par IR culture NGTD unclear if due to ABx use prior to drainage?\par Completed 6 weeks of ceftriaxone 2gm with PICC line\par \par He was seen in the clinic and was started on ciprofloxacin for good oral bioavailability and also based on the new culture with a sensitive Enterobacter. He has no diarrhea or any other side effects. \par \par today he is here for follow up in wound center. Has minimal discharge from lateral knee. No fever, no swelling or pain. \par Seen ortho, will not have any procedure for the collection. \par MRI recently done showed 12.9cm collection with OM. \par  good minus

## 2022-05-20 NOTE — PHYSICAL EXAM
[General Appearance - Alert] : alert [General Appearance - In No Acute Distress] : in no acute distress [Sclera] : the sclera and conjunctiva were normal [PERRL With Normal Accommodation] : pupils were equal in size, round, reactive to light [Extraocular Movements] : extraocular movements were intact [Outer Ear] : the ears and nose were normal in appearance [Oropharynx] : the oropharynx was normal with no thrush [Auscultation Breath Sounds / Voice Sounds] : lungs were clear to auscultation bilaterally [Heart Rate And Rhythm] : heart rate was normal and rhythm regular [Heart Sounds] : normal S1 and S2 [Heart Sounds Gallop] : no gallops [Murmurs] : no murmurs [Heart Sounds Pericardial Friction Rub] : no pericardial rub [Full Pulse] : the pedal pulses are present [Edema] : there was no peripheral edema [Bowel Sounds] : normal bowel sounds [Abdomen Soft] : soft [Abdomen Tenderness] : non-tender [] : no hepato-splenomegaly [Abdomen Mass (___ Cm)] : no abdominal mass palpated [Costovertebral Angle Tenderness] : no CVA tenderness [No Palpable Adenopathy] : no palpable adenopathy [FreeTextEntry1] : less discharge from wound. almost closed.  [Deep Tendon Reflexes (DTR)] : deep tendon reflexes were 2+ and symmetric [Sensation] : the sensory exam was normal to light touch and pinprick [No Focal Deficits] : no focal deficits [Oriented To Time, Place, And Person] : oriented to person, place, and time [Affect] : the affect was normal

## 2022-05-21 DIAGNOSIS — Z98.890 OTHER SPECIFIED POSTPROCEDURAL STATES: ICD-10-CM

## 2022-05-21 DIAGNOSIS — J44.9 CHRONIC OBSTRUCTIVE PULMONARY DISEASE, UNSPECIFIED: ICD-10-CM

## 2022-05-21 DIAGNOSIS — E11.622 TYPE 2 DIABETES MELLITUS WITH OTHER SKIN ULCER: ICD-10-CM

## 2022-05-21 DIAGNOSIS — Z79.02 LONG TERM (CURRENT) USE OF ANTITHROMBOTICS/ANTIPLATELETS: ICD-10-CM

## 2022-05-21 DIAGNOSIS — Z79.01 LONG TERM (CURRENT) USE OF ANTICOAGULANTS: ICD-10-CM

## 2022-05-21 DIAGNOSIS — Z79.4 LONG TERM (CURRENT) USE OF INSULIN: ICD-10-CM

## 2022-05-21 DIAGNOSIS — Z79.84 LONG TERM (CURRENT) USE OF ORAL HYPOGLYCEMIC DRUGS: ICD-10-CM

## 2022-05-21 DIAGNOSIS — I11.0 HYPERTENSIVE HEART DISEASE WITH HEART FAILURE: ICD-10-CM

## 2022-05-21 DIAGNOSIS — Z79.2 LONG TERM (CURRENT) USE OF ANTIBIOTICS: ICD-10-CM

## 2022-05-21 DIAGNOSIS — L97.112 NON-PRESSURE CHRONIC ULCER OF RIGHT THIGH WITH FAT LAYER EXPOSED: ICD-10-CM

## 2022-05-21 DIAGNOSIS — M81.0 AGE-RELATED OSTEOPOROSIS WITHOUT CURRENT PATHOLOGICAL FRACTURE: ICD-10-CM

## 2022-05-21 DIAGNOSIS — Z79.899 OTHER LONG TERM (CURRENT) DRUG THERAPY: ICD-10-CM

## 2022-05-21 DIAGNOSIS — I50.9 HEART FAILURE, UNSPECIFIED: ICD-10-CM

## 2022-05-21 DIAGNOSIS — J45.50 SEVERE PERSISTENT ASTHMA, UNCOMPLICATED: ICD-10-CM

## 2022-05-21 DIAGNOSIS — E78.00 PURE HYPERCHOLESTEROLEMIA, UNSPECIFIED: ICD-10-CM

## 2022-05-21 DIAGNOSIS — Z95.1 PRESENCE OF AORTOCORONARY BYPASS GRAFT: ICD-10-CM

## 2022-05-21 DIAGNOSIS — Z95.5 PRESENCE OF CORONARY ANGIOPLASTY IMPLANT AND GRAFT: ICD-10-CM

## 2022-05-21 DIAGNOSIS — E11.51 TYPE 2 DIABETES MELLITUS WITH DIABETIC PERIPHERAL ANGIOPATHY WITHOUT GANGRENE: ICD-10-CM

## 2022-05-25 ENCOUNTER — INPATIENT (INPATIENT)
Facility: HOSPITAL | Age: 82
LOS: 2 days | Discharge: HOME | DRG: 189 | End: 2022-05-28
Attending: INTERNAL MEDICINE | Admitting: INTERNAL MEDICINE
Payer: COMMERCIAL

## 2022-05-25 VITALS
TEMPERATURE: 96 F | RESPIRATION RATE: 26 BRPM | DIASTOLIC BLOOD PRESSURE: 94 MMHG | OXYGEN SATURATION: 96 % | HEIGHT: 71 IN | HEART RATE: 120 BPM | SYSTOLIC BLOOD PRESSURE: 134 MMHG

## 2022-05-25 DIAGNOSIS — J96.01 ACUTE RESPIRATORY FAILURE WITH HYPOXIA: ICD-10-CM

## 2022-05-25 DIAGNOSIS — Z86.79 PERSONAL HISTORY OF OTHER DISEASES OF THE CIRCULATORY SYSTEM: ICD-10-CM

## 2022-05-25 DIAGNOSIS — I73.9 PERIPHERAL VASCULAR DISEASE, UNSPECIFIED: ICD-10-CM

## 2022-05-25 DIAGNOSIS — Z87.438 PERSONAL HISTORY OF OTHER DISEASES OF MALE GENITAL ORGANS: ICD-10-CM

## 2022-05-25 DIAGNOSIS — I10 ESSENTIAL (PRIMARY) HYPERTENSION: ICD-10-CM

## 2022-05-25 DIAGNOSIS — Z95.5 PRESENCE OF CORONARY ANGIOPLASTY IMPLANT AND GRAFT: Chronic | ICD-10-CM

## 2022-05-25 DIAGNOSIS — J44.1 CHRONIC OBSTRUCTIVE PULMONARY DISEASE WITH (ACUTE) EXACERBATION: ICD-10-CM

## 2022-05-25 DIAGNOSIS — N18.9 CHRONIC KIDNEY DISEASE, UNSPECIFIED: ICD-10-CM

## 2022-05-25 DIAGNOSIS — J96.21 ACUTE AND CHRONIC RESPIRATORY FAILURE WITH HYPOXIA: ICD-10-CM

## 2022-05-25 DIAGNOSIS — T14.8XXA OTHER INJURY OF UNSPECIFIED BODY REGION, INITIAL ENCOUNTER: Chronic | ICD-10-CM

## 2022-05-25 DIAGNOSIS — M86.60 OTHER CHRONIC OSTEOMYELITIS, UNSPECIFIED SITE: ICD-10-CM

## 2022-05-25 DIAGNOSIS — I25.10 ATHEROSCLEROTIC HEART DISEASE OF NATIVE CORONARY ARTERY WITHOUT ANGINA PECTORIS: ICD-10-CM

## 2022-05-25 DIAGNOSIS — E78.5 HYPERLIPIDEMIA, UNSPECIFIED: ICD-10-CM

## 2022-05-25 DIAGNOSIS — Z29.9 ENCOUNTER FOR PROPHYLACTIC MEASURES, UNSPECIFIED: ICD-10-CM

## 2022-05-25 DIAGNOSIS — E11.9 TYPE 2 DIABETES MELLITUS WITHOUT COMPLICATIONS: ICD-10-CM

## 2022-05-25 LAB
ALBUMIN SERPL ELPH-MCNC: 4.2 G/DL — SIGNIFICANT CHANGE UP (ref 3.3–5)
ALP SERPL-CCNC: 107 U/L — SIGNIFICANT CHANGE UP (ref 40–120)
ALT FLD-CCNC: 38 U/L — SIGNIFICANT CHANGE UP (ref 12–78)
ANION GAP SERPL CALC-SCNC: 6 MMOL/L — SIGNIFICANT CHANGE UP (ref 5–17)
AST SERPL-CCNC: 34 U/L — SIGNIFICANT CHANGE UP (ref 15–37)
BASE EXCESS BLDA CALC-SCNC: -0.4 MMOL/L — SIGNIFICANT CHANGE UP (ref -2–3)
BASE EXCESS BLDA CALC-SCNC: -0.4 MMOL/L — SIGNIFICANT CHANGE UP (ref -2–3)
BASE EXCESS BLDA CALC-SCNC: 2.3 MMOL/L — SIGNIFICANT CHANGE UP (ref -2–3)
BASE EXCESS BLDA CALC-SCNC: 3 MMOL/L — SIGNIFICANT CHANGE UP (ref -2–3)
BASE EXCESS BLDV CALC-SCNC: 1.3 MMOL/L — SIGNIFICANT CHANGE UP (ref -2–3)
BASOPHILS # BLD AUTO: 0.05 K/UL — SIGNIFICANT CHANGE UP (ref 0–0.2)
BASOPHILS NFR BLD AUTO: 0.3 % — SIGNIFICANT CHANGE UP (ref 0–2)
BILIRUB SERPL-MCNC: 0.3 MG/DL — SIGNIFICANT CHANGE UP (ref 0.2–1.2)
BLOOD GAS COMMENTS ARTERIAL: SIGNIFICANT CHANGE UP
BLOOD GAS COMMENTS, VENOUS: SIGNIFICANT CHANGE UP
BUN SERPL-MCNC: 30 MG/DL — HIGH (ref 7–23)
CALCIUM SERPL-MCNC: 9.8 MG/DL — SIGNIFICANT CHANGE UP (ref 8.5–10.1)
CHLORIDE SERPL-SCNC: 105 MMOL/L — SIGNIFICANT CHANGE UP (ref 96–108)
CO2 SERPL-SCNC: 29 MMOL/L — SIGNIFICANT CHANGE UP (ref 22–31)
CREAT SERPL-MCNC: 1.7 MG/DL — HIGH (ref 0.5–1.3)
EGFR: 40 ML/MIN/1.73M2 — LOW
EOSINOPHIL # BLD AUTO: 0.02 K/UL — SIGNIFICANT CHANGE UP (ref 0–0.5)
EOSINOPHIL NFR BLD AUTO: 0.1 % — SIGNIFICANT CHANGE UP (ref 0–6)
GAS PNL BLDA: SIGNIFICANT CHANGE UP
GAS PNL BLDV: SIGNIFICANT CHANGE UP
GLUCOSE SERPL-MCNC: 268 MG/DL — HIGH (ref 70–99)
HCO3 BLDA-SCNC: 27 MMOL/L — SIGNIFICANT CHANGE UP (ref 21–28)
HCO3 BLDA-SCNC: 27 MMOL/L — SIGNIFICANT CHANGE UP (ref 21–28)
HCO3 BLDA-SCNC: 28 MMOL/L — SIGNIFICANT CHANGE UP (ref 21–28)
HCO3 BLDA-SCNC: 31 MMOL/L — HIGH (ref 21–28)
HCO3 BLDV-SCNC: 31 MMOL/L — HIGH (ref 22–29)
HCT VFR BLD CALC: 50.3 % — HIGH (ref 39–50)
HGB BLD-MCNC: 14.6 G/DL — SIGNIFICANT CHANGE UP (ref 13–17)
HOROWITZ INDEX BLDA+IHG-RTO: 21 — SIGNIFICANT CHANGE UP
HOROWITZ INDEX BLDA+IHG-RTO: 21 — SIGNIFICANT CHANGE UP
IMM GRANULOCYTES NFR BLD AUTO: 0.8 % — SIGNIFICANT CHANGE UP (ref 0–1.5)
LACTATE SERPL-SCNC: 1.6 MMOL/L — SIGNIFICANT CHANGE UP (ref 0.7–2)
LYMPHOCYTES # BLD AUTO: 14.4 % — SIGNIFICANT CHANGE UP (ref 13–44)
LYMPHOCYTES # BLD AUTO: 2.49 K/UL — SIGNIFICANT CHANGE UP (ref 1–3.3)
MCHC RBC-ENTMCNC: 26.4 PG — LOW (ref 27–34)
MCHC RBC-ENTMCNC: 29 GM/DL — LOW (ref 32–36)
MCV RBC AUTO: 91.1 FL — SIGNIFICANT CHANGE UP (ref 80–100)
MONOCYTES # BLD AUTO: 1 K/UL — HIGH (ref 0–0.9)
MONOCYTES NFR BLD AUTO: 5.8 % — SIGNIFICANT CHANGE UP (ref 2–14)
NEUTROPHILS # BLD AUTO: 13.58 K/UL — HIGH (ref 1.8–7.4)
NEUTROPHILS NFR BLD AUTO: 78.6 % — HIGH (ref 43–77)
NRBC # BLD: 0 /100 WBCS — SIGNIFICANT CHANGE UP (ref 0–0)
PCO2 BLDA: 52 MMHG — HIGH (ref 35–48)
PCO2 BLDA: 63 MMHG — HIGH (ref 35–48)
PCO2 BLDA: 65 MMHG — HIGH (ref 35–48)
PCO2 BLDA: 75 MMHG — CRITICAL HIGH (ref 35–48)
PCO2 BLDV: 97 MMHG — HIGH (ref 42–55)
PH BLDA: 7.22 — LOW (ref 7.35–7.45)
PH BLDA: 7.23 — LOW (ref 7.35–7.45)
PH BLDA: 7.24 — LOW (ref 7.35–7.45)
PH BLDA: 7.34 — LOW (ref 7.35–7.45)
PH BLDV: 7.11 — LOW (ref 7.32–7.43)
PLATELET # BLD AUTO: 369 K/UL — SIGNIFICANT CHANGE UP (ref 150–400)
PO2 BLDA: 146 MMHG — HIGH (ref 83–108)
PO2 BLDA: 66 MMHG — LOW (ref 83–108)
PO2 BLDA: 95 MMHG — SIGNIFICANT CHANGE UP (ref 83–108)
PO2 BLDA: <34 MMHG — CRITICAL LOW (ref 83–108)
PO2 BLDV: 57 MMHG — HIGH (ref 25–45)
POTASSIUM SERPL-MCNC: 4.6 MMOL/L — SIGNIFICANT CHANGE UP (ref 3.5–5.3)
POTASSIUM SERPL-SCNC: 4.6 MMOL/L — SIGNIFICANT CHANGE UP (ref 3.5–5.3)
PROCALCITONIN SERPL-MCNC: <0.05 — SIGNIFICANT CHANGE UP (ref 0–0.04)
PROT SERPL-MCNC: 8.2 G/DL — SIGNIFICANT CHANGE UP (ref 6–8.3)
RAPID RVP RESULT: SIGNIFICANT CHANGE UP
RBC # BLD: 5.52 M/UL — SIGNIFICANT CHANGE UP (ref 4.2–5.8)
RBC # FLD: 14 % — SIGNIFICANT CHANGE UP (ref 10.3–14.5)
SAO2 % BLDA: 42.8 % — LOW (ref 94–98)
SAO2 % BLDA: 92.1 % — LOW (ref 94–98)
SAO2 % BLDA: 98.4 % — HIGH (ref 94–98)
SAO2 % BLDA: 99.2 % — HIGH (ref 94–98)
SAO2 % BLDV: 84 % — SIGNIFICANT CHANGE UP (ref 67–88)
SARS-COV-2 N GENE NPH QL NAA+PROBE: NOT DETECTED
SARS-COV-2 RNA SPEC QL NAA+PROBE: SIGNIFICANT CHANGE UP
SODIUM SERPL-SCNC: 140 MMOL/L — SIGNIFICANT CHANGE UP (ref 135–145)
TROPONIN I, HIGH SENSITIVITY RESULT: 11.2 NG/L — SIGNIFICANT CHANGE UP
WBC # BLD: 17.28 K/UL — HIGH (ref 3.8–10.5)
WBC # FLD AUTO: 17.28 K/UL — HIGH (ref 3.8–10.5)

## 2022-05-25 PROCEDURE — 71045 X-RAY EXAM CHEST 1 VIEW: CPT | Mod: 26

## 2022-05-25 PROCEDURE — 99291 CRITICAL CARE FIRST HOUR: CPT

## 2022-05-25 PROCEDURE — 99497 ADVNCD CARE PLAN 30 MIN: CPT | Mod: 25

## 2022-05-25 PROCEDURE — 93010 ELECTROCARDIOGRAM REPORT: CPT

## 2022-05-25 PROCEDURE — 99223 1ST HOSP IP/OBS HIGH 75: CPT | Mod: GC,25

## 2022-05-25 PROCEDURE — 99223 1ST HOSP IP/OBS HIGH 75: CPT

## 2022-05-25 PROCEDURE — 99291 CRITICAL CARE FIRST HOUR: CPT | Mod: GC

## 2022-05-25 RX ORDER — CHLORHEXIDINE GLUCONATE 213 G/1000ML
1 SOLUTION TOPICAL
Refills: 0 | Status: DISCONTINUED | OUTPATIENT
Start: 2022-05-25 | End: 2022-05-26

## 2022-05-25 RX ORDER — DEXTROSE 50 % IN WATER 50 %
15 SYRINGE (ML) INTRAVENOUS ONCE
Refills: 0 | Status: DISCONTINUED | OUTPATIENT
Start: 2022-05-25 | End: 2022-05-28

## 2022-05-25 RX ORDER — ATORVASTATIN CALCIUM 80 MG/1
80 TABLET, FILM COATED ORAL AT BEDTIME
Refills: 0 | Status: DISCONTINUED | OUTPATIENT
Start: 2022-05-25 | End: 2022-05-28

## 2022-05-25 RX ORDER — INSULIN LISPRO 100/ML
VIAL (ML) SUBCUTANEOUS AT BEDTIME
Refills: 0 | Status: DISCONTINUED | OUTPATIENT
Start: 2022-05-25 | End: 2022-05-28

## 2022-05-25 RX ORDER — IPRATROPIUM/ALBUTEROL SULFATE 18-103MCG
3 AEROSOL WITH ADAPTER (GRAM) INHALATION ONCE
Refills: 0 | Status: COMPLETED | OUTPATIENT
Start: 2022-05-25 | End: 2022-05-25

## 2022-05-25 RX ORDER — MONTELUKAST 4 MG/1
10 TABLET, CHEWABLE ORAL DAILY
Refills: 0 | Status: DISCONTINUED | OUTPATIENT
Start: 2022-05-25 | End: 2022-05-28

## 2022-05-25 RX ORDER — LOSARTAN POTASSIUM 100 MG/1
25 TABLET, FILM COATED ORAL DAILY
Refills: 0 | Status: DISCONTINUED | OUTPATIENT
Start: 2022-05-25 | End: 2022-05-25

## 2022-05-25 RX ORDER — ASPIRIN/CALCIUM CARB/MAGNESIUM 324 MG
81 TABLET ORAL DAILY
Refills: 0 | Status: DISCONTINUED | OUTPATIENT
Start: 2022-05-25 | End: 2022-05-25

## 2022-05-25 RX ORDER — DEXTROSE 50 % IN WATER 50 %
12.5 SYRINGE (ML) INTRAVENOUS ONCE
Refills: 0 | Status: DISCONTINUED | OUTPATIENT
Start: 2022-05-25 | End: 2022-05-28

## 2022-05-25 RX ORDER — ALBUTEROL 90 UG/1
2.5 AEROSOL, METERED ORAL EVERY 6 HOURS
Refills: 0 | Status: DISCONTINUED | OUTPATIENT
Start: 2022-05-25 | End: 2022-05-28

## 2022-05-25 RX ORDER — IPRATROPIUM/ALBUTEROL SULFATE 18-103MCG
3 AEROSOL WITH ADAPTER (GRAM) INHALATION EVERY 6 HOURS
Refills: 0 | Status: DISCONTINUED | OUTPATIENT
Start: 2022-05-25 | End: 2022-05-27

## 2022-05-25 RX ORDER — INSULIN ASPART 100 [IU]/ML
0 INJECTION, SOLUTION SUBCUTANEOUS
Qty: 0 | Refills: 0 | DISCHARGE

## 2022-05-25 RX ORDER — BUDESONIDE AND FORMOTEROL FUMARATE DIHYDRATE 160; 4.5 UG/1; UG/1
2 AEROSOL RESPIRATORY (INHALATION)
Refills: 0 | Status: DISCONTINUED | OUTPATIENT
Start: 2022-05-25 | End: 2022-05-28

## 2022-05-25 RX ORDER — APIXABAN 2.5 MG/1
5 TABLET, FILM COATED ORAL EVERY 12 HOURS
Refills: 0 | Status: DISCONTINUED | OUTPATIENT
Start: 2022-05-25 | End: 2022-05-25

## 2022-05-25 RX ORDER — GLUCAGON INJECTION, SOLUTION 0.5 MG/.1ML
1 INJECTION, SOLUTION SUBCUTANEOUS ONCE
Refills: 0 | Status: DISCONTINUED | OUTPATIENT
Start: 2022-05-25 | End: 2022-05-28

## 2022-05-25 RX ORDER — APIXABAN 2.5 MG/1
2.5 TABLET, FILM COATED ORAL EVERY 12 HOURS
Refills: 0 | Status: DISCONTINUED | OUTPATIENT
Start: 2022-05-25 | End: 2022-05-28

## 2022-05-25 RX ORDER — INSULIN LISPRO 100/ML
VIAL (ML) SUBCUTANEOUS
Refills: 0 | Status: DISCONTINUED | OUTPATIENT
Start: 2022-05-25 | End: 2022-05-28

## 2022-05-25 RX ORDER — SODIUM CHLORIDE 9 MG/ML
1000 INJECTION, SOLUTION INTRAVENOUS
Refills: 0 | Status: DISCONTINUED | OUTPATIENT
Start: 2022-05-25 | End: 2022-05-28

## 2022-05-25 RX ORDER — METOPROLOL TARTRATE 50 MG
50 TABLET ORAL
Refills: 0 | Status: DISCONTINUED | OUTPATIENT
Start: 2022-05-25 | End: 2022-05-25

## 2022-05-25 RX ORDER — METOPROLOL TARTRATE 50 MG
25 TABLET ORAL ONCE
Refills: 0 | Status: COMPLETED | OUTPATIENT
Start: 2022-05-25 | End: 2022-05-25

## 2022-05-25 RX ORDER — APIXABAN 2.5 MG/1
2.5 TABLET, FILM COATED ORAL EVERY 12 HOURS
Refills: 0 | Status: DISCONTINUED | OUTPATIENT
Start: 2022-05-25 | End: 2022-05-25

## 2022-05-25 RX ORDER — FLUTICASONE FUROATE AND VILANTEROL TRIFENATATE 100; 25 UG/1; UG/1
1 POWDER RESPIRATORY (INHALATION)
Qty: 0 | Refills: 0 | DISCHARGE

## 2022-05-25 RX ORDER — CLOPIDOGREL BISULFATE 75 MG/1
75 TABLET, FILM COATED ORAL DAILY
Refills: 0 | Status: DISCONTINUED | OUTPATIENT
Start: 2022-05-25 | End: 2022-05-28

## 2022-05-25 RX ORDER — DEXTROSE 50 % IN WATER 50 %
25 SYRINGE (ML) INTRAVENOUS ONCE
Refills: 0 | Status: DISCONTINUED | OUTPATIENT
Start: 2022-05-25 | End: 2022-05-28

## 2022-05-25 RX ORDER — METOPROLOL TARTRATE 50 MG
25 TABLET ORAL EVERY 12 HOURS
Refills: 0 | Status: DISCONTINUED | OUTPATIENT
Start: 2022-05-25 | End: 2022-05-27

## 2022-05-25 RX ORDER — UMECLIDINIUM 62.5 UG/1
1 AEROSOL, POWDER ORAL
Qty: 0 | Refills: 0 | DISCHARGE

## 2022-05-25 RX ORDER — TAMSULOSIN HYDROCHLORIDE 0.4 MG/1
0.4 CAPSULE ORAL AT BEDTIME
Refills: 0 | Status: DISCONTINUED | OUTPATIENT
Start: 2022-05-25 | End: 2022-05-28

## 2022-05-25 RX ORDER — CIPROFLOXACIN LACTATE 400MG/40ML
500 VIAL (ML) INTRAVENOUS EVERY 12 HOURS
Refills: 0 | Status: DISCONTINUED | OUTPATIENT
Start: 2022-05-25 | End: 2022-05-28

## 2022-05-25 RX ORDER — AZITHROMYCIN 500 MG/1
250 TABLET, FILM COATED ORAL
Refills: 0 | Status: DISCONTINUED | OUTPATIENT
Start: 2022-05-25 | End: 2022-05-28

## 2022-05-25 RX ADMIN — Medication 25 MILLIGRAM(S): at 14:25

## 2022-05-25 RX ADMIN — Medication 3 MILLILITER(S): at 07:00

## 2022-05-25 RX ADMIN — Medication 40 MILLIGRAM(S): at 21:29

## 2022-05-25 RX ADMIN — AZITHROMYCIN 250 MILLIGRAM(S): 500 TABLET, FILM COATED ORAL at 17:31

## 2022-05-25 RX ADMIN — MONTELUKAST 10 MILLIGRAM(S): 4 TABLET, CHEWABLE ORAL at 13:30

## 2022-05-25 RX ADMIN — Medication 40 MILLIGRAM(S): at 11:38

## 2022-05-25 RX ADMIN — Medication 3 MILLILITER(S): at 14:06

## 2022-05-25 RX ADMIN — Medication 125 MILLIGRAM(S): at 08:14

## 2022-05-25 RX ADMIN — LOSARTAN POTASSIUM 25 MILLIGRAM(S): 100 TABLET, FILM COATED ORAL at 13:30

## 2022-05-25 RX ADMIN — TAMSULOSIN HYDROCHLORIDE 0.4 MILLIGRAM(S): 0.4 CAPSULE ORAL at 21:30

## 2022-05-25 RX ADMIN — Medication 3 MILLILITER(S): at 07:22

## 2022-05-25 RX ADMIN — ATORVASTATIN CALCIUM 80 MILLIGRAM(S): 80 TABLET, FILM COATED ORAL at 21:30

## 2022-05-25 RX ADMIN — Medication 6: at 13:44

## 2022-05-25 RX ADMIN — Medication 500 MILLIGRAM(S): at 17:31

## 2022-05-25 RX ADMIN — Medication 40 MILLIGRAM(S): at 13:30

## 2022-05-25 RX ADMIN — Medication 3 MILLILITER(S): at 07:47

## 2022-05-25 RX ADMIN — Medication 2: at 17:32

## 2022-05-25 RX ADMIN — Medication 3 MILLILITER(S): at 19:38

## 2022-05-25 RX ADMIN — APIXABAN 2.5 MILLIGRAM(S): 2.5 TABLET, FILM COATED ORAL at 17:31

## 2022-05-25 RX ADMIN — CLOPIDOGREL BISULFATE 75 MILLIGRAM(S): 75 TABLET, FILM COATED ORAL at 13:30

## 2022-05-25 NOTE — H&P ADULT - NSHPSOCIALHISTORY_GEN_ALL_CORE
Lives at home with wife  Able to ambulate independently, uses cane when in pain  Independent with most ADLs, sometimes needs assistance from his wife.  Former smoker (20 pack-year hx), quit 30 years ago  Denies alcohol use  Former frequent marijuana user (smoking), quit 3 years ago

## 2022-05-25 NOTE — H&P ADULT - PROBLEM SELECTOR PLAN 1
Pt presenting with difficulty breathing, hypoxia at home, and hypercapnia seen on ABG, consistent with COPD exacerbation  - ABG on admission 7.23/65/95/27 --> 7.24/63/66/27  - Continue BIPAP, wean as tolerated  - Given Duonebs x 2, Solumedrol 125 mg in ED  - Continue Duonebs q6h, albuterol nebs q6h, solumedrol 40 mg q8h  - Monitor ABGs  - Respiratory care as per ICU

## 2022-05-25 NOTE — ED PROVIDER NOTE - PHYSICAL EXAMINATION
Gen: alert, severe respiratory distress, started on BIPAP upon arrival  HEENT:  NC/AT, PERR, no exophthalmos  CV:  well perfused, tachy  Pulm:  poor air movement, normal RR, I/E ratio and chest excursion  Abd: s/nt/nd  MSK: moving all extremities, lateral L thigh with small bandaged area that is site of OM  Neuro:  non-focal  Skin:  visualized areas intact

## 2022-05-25 NOTE — CONSULT NOTE ADULT - SUBJECTIVE AND OBJECTIVE BOX
[facility]  INFECTIOUS DISEASES   [address] [place] [address]  Tel: [phone]     Fax: [phone]  ======================================================  MD Noman Perry Kaushal, MD Cho, Michelle, MD   ======================================================    [id number]  YUE      CC: Shortness of breath    HPI:  81 y/o man with a PMHx of HTN, Dm2, COPD (on home O2 prn and nocturnal BIPAP), CAD, pAfib and L femur chronic osteomyelitis was admitted with SOB. He was placed on BiPAP in MICU. Feels better.   He also has left femoral intramedullary abscess and OM for which completed 6 weeks of IV treatment and after that placed on suppressive therapy with ciprofloxacin.   No pain in leg, the opening is smaller with less discharge and left lateral side of thigh.     PAST MEDICAL & SURGICAL HISTORY:  Diabetes Mellitus, Type II  CAD (Coronary Artery Disease)  s/p 3v CABG ; stents placed in [place] in 2019  Dyslipidemia  Osteomyelitis  COPD (chronic obstructive pulmonary disease)  on 2L at home and BiPAP at night; intubated   Hypertension  PVD (peripheral vascular disease)  History of PAT (paroxysmal atrial tachycardia)  CABG (Coronary Artery Bypass Graft)    Compound fracture  left leg  S/P primary angioplasty with coronary stent    Social Hx: no current smoking, ETOH or drugs     FAMILY HISTORY:  Family history of diabetes mellitus (Sibling)    Family hx of lung cancer  brother,  age 82, used to smoke with pt    Allergies  No Known Allergies    Antibiotics:  MEDICATIONS  (STANDING):  albuterol/ipratropium for Nebulization 3 milliLiter(s) Nebulizer every 6 hours  apixaban 2.5 milliGRAM(s) Oral every 12 hours  atorvastatin 80 milliGRAM(s) Oral at bedtime  azithromycin   Tablet 250 milliGRAM(s) Oral <User Schedule>  budesonide 160 MICROgram(s)/formoterol 4.5 MICROgram(s) Inhaler 2 Puff(s) Inhalation two times a day  chlorhexidine 4% Liquid 1 Application(s) Topical <User Schedule>  ciprofloxacin     Tablet 500 milliGRAM(s) Oral every 12 hours  clopidogrel Tablet 75 milliGRAM(s) Oral daily  dextrose 5%. 1000 milliLiter(s) (50 mL/Hr) IV Continuous <Continuous>  dextrose 5%. 1000 milliLiter(s) (100 mL/Hr) IV Continuous <Continuous>  dextrose 50% Injectable 25 Gram(s) IV Push once  dextrose 50% Injectable 12.5 Gram(s) IV Push once  dextrose 50% Injectable 25 Gram(s) IV Push once  glucagon  Injectable 1 milliGRAM(s) IntraMuscular once  insulin lispro (ADMELOG) corrective regimen sliding scale   SubCutaneous three times a day before meals  insulin lispro (ADMELOG) corrective regimen sliding scale   SubCutaneous at bedtime  losartan 25 milliGRAM(s) Oral daily  methylPREDNISolone sodium succinate Injectable 40 milliGRAM(s) IV Push every 8 hours  metoprolol tartrate 25 milliGRAM(s) Oral every 12 hours  metoprolol tartrate 25 milliGRAM(s) Oral once  montelukast 10 milliGRAM(s) Oral daily  tamsulosin 0.4 milliGRAM(s) Oral at bedtime    REVIEW OF SYSTEMS:  CONSTITUTIONAL:  No Fever or chills  HEENT:  No diplopia or blurred vision.  No sore throat or runny nose.  CARDIOVASCULAR:  No chest pain or SOB.  RESPIRATORY:  No cough, +shortness of breath,  GASTROINTESTINAL:  No nausea, vomiting or diarrhea.  GENITOURINARY:  No dysuria, frequency or urgency. No Blood in urine  MUSCULOSKELETAL:  no joint aches, no muscle pain  SKIN:  No change in skin, hair or nails.  NEUROLOGIC:  No paresthesias, fasciculations, seizures or weakness.  PSYCHIATRIC:  No disorder of thought or mood.  ENDOCRINE:  No heat or cold intolerance, polyuria or polydipsia.  HEMATOLOGICAL:  No easy bruising or bleeding.     Physical Exam:  Vital Signs Last 24 Hrs  T(C): 36.4 (25 May 2022 12:22), Max: 36.5 (25 May 2022 12:19)  T(F): 97.6 (25 May 2022 12:22), Max: 97.7 (25 May 2022 12:19)  HR: 113 (25 May 2022 14:14) (105 - 138)  BP: 174/85 (25 May 2022 13:00) (134/94 - 174/85)  BP(mean): 118 (25 May 2022 13:00) (111 - 118)  RR: 27 (25 May 2022 13:00) (20 - 27)  SpO2: 100% (25 May 2022 14:14) (95% - 100%)  Height (cm): 180.3 ( @ 06:58)  Weight (kg): 75 ( @ 10:21)  BMI (kg/m2): 23.1 ( @ 10:21)  BSA (m2): 1.94 ( @ 10:21)  GEN: NAD but on BiPAP 21% O2  HEENT: normocephalic and atraumatic. EOMI. PERRL.    NECK: Supple.  No lymphadenopathy   LUNGS: Decreased breath sounds bilaterally with scattered rhonchi both sides  HEART: Regular rate and rhythm without murmur.  ABDOMEN: Soft, nontender, and nondistended.  Positive bowel sounds.    : No CVA tenderness  EXTREMITIES: left lateral thigh with a healed scar, opening in the middle of scar with some serosanguinous fluid.   NEUROLOGIC: grossly intact.  PSYCHIATRIC: Appropriate affect .  SKIN: No ulceration or induration present.    Labs:      140  |  105  |  30<H>  ----------------------------<  268<H>  4.6   |  29  |  1.70<H>    Ca    9.8      25 May 2022 07:50    TPro  8.2  /  Alb  4.2  /  TBili  0.3  /  DBili  x   /  AST  34  /  ALT  38  /  AlkPhos  107                          14.6   17.28 )-----------( 369      ( 25 May 2022 07:50 )             50.3     LIVER FUNCTIONS - ( 25 May 2022 07:50 )  Alb: 4.2 g/dL / Pro: 8.2 g/dL / ALK PHOS: 107 U/L / ALT: 38 U/L / AST: 34 U/L / GGT: x           ABG - ( 25 May 2022 10:01 )  pH, Arterial: 7.24  pH, Blood: x     /  pCO2: 63    /  pO2: 66    / HCO3: 27    / Base Excess: -0.4  /  SaO2: 92.1      Procalcitonin, Serum: <0.05 (22 @ 07:50)    SARS-CoV-2: NotDetec (22 @ 07:48)    RECENT CULTURES:   @ 07:48      NotDetec    All imaging and other data have been reviewed.  < from: Xray Chest 1 View-PORTABLE IMMEDIATE (22 @ 07:51) >  IMPRESSION: Arterial deficiency noted within the upper lung fields and   flattening of the diaphragm suggest COPD/emphysema. There is a suggestion   for nodular opacities noted overlying the upper lung fields on this   limited portable radiograph, indeterminate. Follow-up PA and lateral   radiography of the chest recommended.    Assessment and Plan:   81 y/o man with a PMHx of HTN, Dm2, COPD (on home O2 prn and nocturnal BIPAP), CAD, pAfib and L femur chronic osteomyelitis was admitted with SOB. He was placed on BiPAP in MICU. Feels better.   He also has left femoral intramedullary abscess and OM for which completed 6 weeks of IV treatment and after that placed on suppressive therapy with ciprofloxacin.   No pain in leg, the opening is smaller with less discharge and left lateral side of thigh.  Has leukocytosis that could be reactional, currently on steroid but at home he takes only 2mg of prednisone.  Procalcitonin is <0.05 and CXR with no obvious opacities, could be just COPD exacerbation.     1- Left femoral OM  - Grew pseudomonas and MSSA in the past  - S/p MRI showed collection, intraosseous abscess  - S/P IR drain placement on   - Will continue suppressive ciprofloxacin, clinically has responded  - No side effects or diarrhea    2- COPD exacerbation  - Doesn't look like a pneumonia   - Has been started on azithromycin by ICU(as an antiinflammatory as well)   - RVP negative   - Steroid and inhalers as per ICU     Thank you for courtesy of this consult.     Will follow.  Discussed with the primary team.     Brandi العلي MD  Division of Infectious Diseases   Please call ID service at [phone] with any question.      55 minutes spent on total encounter assessing patient, examination, chart reivew, counseling and coordinating care by the attending physician/nurse/care manager.

## 2022-05-25 NOTE — ED ADULT NURSE NOTE - PAIN: PRESENCE, MLM
CHIEF COMPLAINT:   Chief Complaint   Patient presents with   • Derm Problem     follow up PDT       HISTORY: Patient is being seen today for a follow-up for photodynamic therapy.    PHYSICAL EXAMINATION: Patient has over 20 erythematous scaly lesions ranging in size from 2-9 mm on his scalp, face, and ears.    FOLLOW-UP: No Follow-up on file.    Perry was seen today for derm problem.    Diagnoses and all orders for this visit:    Seborrheic keratosis versus BCC, left anterior forearm  -     SHAV SKIN LES 0.6-1.0CM TRUNK,ARM,LEG  -     Cancel: SURGICAL PATHOLOGY  -     SURGICAL PATHOLOGY    AK (actinic keratosis)  -     DESTRUCTION PREMALIGNANT 1ST LESION         On 09/19/18, IThuy's, FNP-BC, DCNP, scribed the services personally performed by Narayan Ingram M.D.    The documentation recorded by the scribe accurately and completely reflects the service(s) I personally performed and the decisions made by me.        non-verbal indicators absent

## 2022-05-25 NOTE — H&P ADULT - ATTENDING COMMENTS
This is an 81 y/o M with a PMHx of COPD (on home O2 prn and nocturnal BIPAP), CAD (s/p 3v CABG 2004, stents in 2019), HLD, T2DM, HTN, PAT (on Eliquis), PVD (s/p stenting in b/l legs), L femur chronic osteomyelitis (s/p recent hospitalization for intramedullary abscess drainage, now on chronic ciprofloxacin), presenting with 1 day of increasing respiratory distress, admitted for acute on chronic hypercapnic respiratory failure 2/2 COPD exacerbation.    HPI as above.     Patient seen and examined at bedside. His SOB is improved while on BiPap. He denies any pain anywhere.   Denies headaches, nausea, vomiting, chest pain, palpitations, abdominal pain, constipation, diarrhea, melena, hematochezia, dysuria.     T(C): 36.4 (05-25-22 @ 12:22), Max: 36.5 (05-25-22 @ 12:19)  HR: 113 (05-25-22 @ 14:14) (105 - 138)  BP: 174/85 (05-25-22 @ 13:00) (134/94 - 174/85)  RR: 27 (05-25-22 @ 13:00) (20 - 27)  SpO2: 100% (05-25-22 @ 14:14) (95% - 100%)  Wt(kg): --    Physical Exam:   GENERAL: well-groomed, well-developed, NAD on BiPAP  HEENT: head NC/AT; EOM intact, conjunctiva & sclera clear; hearing grossly intact, moist mucous membranes  NECK: supple, no JVD  RESPIRATORY: wheezing b/l lung bases, no rales  CARDIOVASCULAR: S1&S2, RRR, no murmurs or gallops  ABDOMEN: soft, non-tender, non-distended, + Bowel sounds x4 quadrants, no guarding, rebound or rigidity  MUSCULOSKELETAL:  no clubbing, cyanosis or edema of all 4 extremities  LYMPH: no cervical lymphadenopathy  VASCULAR: Radial pulses 2+ bilaterally, no varicose veins   SKIN: warm and dry, color normal  NEUROLOGIC: AA&O X3, CN2-12 intact w/ no focal deficits, no sensory loss, motor Strength 5/5 in UE & LE B/L  Psych: Normal mood and affect, normal behavior    Plan:   Continue Steroids elsie zithromycin  continmu eneb tratement  -being treated for osteomyelitis with Cipro, will continue. Dr العلي consulted     remainder as above.

## 2022-05-25 NOTE — ED ADULT NURSE NOTE - OBJECTIVE STATEMENT
Pt in respiratory distress, on bipap, able to protect own airway.  No other signs of acute distress noted.

## 2022-05-25 NOTE — H&P ADULT - ASSESSMENT
This is an 81 y/o M with a PMHx of COPD (on home O2 prn and nocturnal BIPAP), CAD (s/p 3v CABG 2004, stents in 2019), HLD, T2DM, HTN, PAT (on Eliquis), PVD (s/p stenting in b/l legs), L femur chronic osteomyelitis (s/p recent hospitalization for intramedullary abscess drainage, now on chronic ciprofloxacin), presenting with 1 day of increasing respiratory distress, admitted for acute on chronic hypercapnic respiratory failure 2/2 COPD exacerbation.

## 2022-05-25 NOTE — H&P ADULT - PROBLEM SELECTOR PLAN 3
Pt with admission creatinine of 1.7, baseline appears to be 1.1-1.2  - Unknown etiology, consider ordering urine studies  - Avoid nephrotoxins, monitor renal indices  - Consider holding Losartan if Cr continues to uptrend Pt with admission creatinine of 1.7, baseline appears to be 1.1-1.2  - Unknown etiology, consider ordering urine studies  - Avoid nephrotoxins, monitor renal indices  - Recommend holding Losartan

## 2022-05-25 NOTE — PATIENT PROFILE ADULT - FALL HARM RISK - HARM RISK INTERVENTIONS

## 2022-05-25 NOTE — H&P ADULT - PROBLEM SELECTOR PLAN 6
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 العلي outpatient  - Continue home Ciprofloxacin 500 mg q12 hours Pt with chronic L femur osteomyelitis after a past accident; admission in December 2021 for L femur intramedullary abscess s/p drainage and prolonged IV abx via PICC line  - Follows with Dr. العلي outpatient  - Continue home Ciprofloxacin 500 mg q12 hours  -Dr العلي consulted

## 2022-05-25 NOTE — PHARMACOTHERAPY INTERVENTION NOTE - COMMENTS
Medication reconciliation was completed by pharmacy representative. The following discrepancies were discussed with Dr. Marcelino:    Current order                                       Home Medication  Not ordered                                        Azithromycin 250 mg 1 tablet MWF   Not ordered                                        Montelukast 10 mg 1 tablet daily  Not ordered                                        Tamsulosin 0.4 mg 1 capsule daily  Metoprolol tartrate 50 mg 1 tablet BID   Metoprolol tartrate 25 mg 1 tablet BID  Not ordered                                        Cipro 500 mg 1 tablet q12h  Aspirin 81 mg 1 tablet daily                   No longer taking    MD aware and would like to adjust medications to match home dose

## 2022-05-25 NOTE — H&P ADULT - HISTORY OF PRESENT ILLNESS
This is an 83 y/o M with a PMHx of COPD (on home O2 prn and nocturnal BIPAP), CAD (s/p 3v CABG 2004, stents in 2019), HLD, T2DM, HTN, PAT (on Eliquis), PVD (s/p stenting in b/l legs), L femur chronic osteomyelitis (s/p recent hospitalization for intramedullary abscess drainage, now on chronic ciprofloxacin), presenting with 1 day of increasing respiratory distress. At home, pt started feeling short of breath last night, with worsening symptoms into this morning. SpO2 was in the low 80s despite increasing home O2 to 6 L. Pt was brought in by EMS on CPAP, with some symptomatic improvement. Pt currently feels a little uncomfortable with the BiPAP mask on, but otherwise notes improvement in his breathing. As per wife, the only symptoms he had prior to the onset of his respiratory distress were fatigue and rhinorrhea. Of note, pt has environmental allergies and at home, his room air conditioner was turned on for the first time since the fall. Pt denies any fevers, chills, chest pain, palpitations, abd pain, constipation, diarrhea, dysuria. Pt has no other complaints or concerns at this time.    ED course:  VS T 95.7 F, , /94, RR 26, SpO2 96% on BiPAP  Labs significant for WBC 17.28, HCO3 29, BUN/Cr 30/1.7, ABG 7.23/65/95/27 --> 7.24/63/66/27  EKG NSR, rate 121  CXR showing arterial deficiency in upper lung fields, and flattening of diaphragms c/w COPD/emphysema  Given Duonebs x 2, Solumedrol 125 mg, put on BiPAP in ED  ICU consulted, pt to be admitted to the ICU for further respiratory management

## 2022-05-25 NOTE — H&P ADULT - NSHPREVIEWOFSYSTEMS_GEN_ALL_CORE
CONSTITUTIONAL: denies fever, chills, weakness; + Fatigue  HEENT: denies blurred vision, sore throat; + Rhinorrhea  SKIN: denies new lesions, rash  CARDIOVASCULAR: denies chest pain, chest pressure, palpitations  RESPIRATORY: denies cough, sputum production; + Shortness of breath  GASTROINTESTINAL: denies nausea, vomiting, diarrhea, abdominal pain, melena, hematochezia  GENITOURINARY: denies dysuria, discharge  NEUROLOGICAL: denies numbness, headache, focal weakness  MUSCULOSKELETAL: denies new joint pain, muscle aches, no left thigh pain or drainage  HEMATOLOGIC: denies gross bleeding, bruising  LYMPHATICS: denies enlarged lymph nodes, extremity swelling  PSYCHIATRIC: denies recent changes in anxiety, depression

## 2022-05-25 NOTE — CONSULT NOTE ADULT - SUBJECTIVE AND OBJECTIVE BOX
YUE COTTO  [id number]  Patient is a 82y old  Male who presents with a chief complaint of COPD exacerbation (25 May 2022 11:38)    HPI:  This is an 81 y/o M with a PMHx of COPD (on home O2 prn and nocturnal BIPAP), CAD (s/p 3v CABG 2004, stents in 2019), HLD, T2DM, HTN, PAT (on Eliquis), PVD (s/p stenting in b/l legs), L femur chronic osteomyelitis (s/p recent hospitalization for intramedullary abscess drainage, now on chronic ciprofloxacin), presenting with 1 day of increasing respiratory distress. At home, pt started feeling short of breath last night, with worsening symptoms into this morning. SpO2 was in the low 80s despite increasing home O2 to 6 L. Pt was brought in by EMS on CPAP, with some symptomatic improvement. Pt currently feels a little uncomfortable with the BiPAP mask on, but otherwise notes improvement in his breathing. As per wife, the only symptoms he had prior to the onset of his respiratory distress were fatigue and rhinorrhea. Of note, pt has environmental allergies and at home, his room air conditioner was turned on for the first time since the fall. Pt denies any fevers, chills, chest pain, palpitations, abd pain, constipation, diarrhea, dysuria. Pt has no other complaints or concerns at this time.    ED course:  VS T 95.7 F, , /94, RR 26, SpO2 96% on BiPAP  Labs significant for WBC 17.28, HCO3 29, BUN/Cr 30/1.7, ABG 7.23/65/95/27 --> 7.24/63/66/27  EKG NSR, rate 121  CXR showing arterial deficiency in upper lung fields, and flattening of diaphragms c/w COPD/emphysema  Given Duonebs x 2, Solumedrol 125 mg, put on BiPAP in ED  ICU consulted, pt to be admitted to the ICU for further respiratory management   (25 May 2022 11:38)      Events in last 24 hours:     REVIEW OF SYSTEMS:    Gen: No fever  EYES/ENT: No visual changes;  No vertigo or throat pain   NECK: No pain   RES:  No shortness of breath or Cough  CARD: No chest pain   GI: No abdominal pain  : No dysuria  NEURO: No weakness  SKIN: No itching, rashes     Physical Exam:  Vital Signs Last 24 Hrs  T(C): 36.4 (25 May 2022 12:22), Max: 36.5 (25 May 2022 12:19)  T(F): 97.6 (25 May 2022 12:22), Max: 97.7 (25 May 2022 12:19)  HR: 123 (25 May 2022 12:30) (105 - 138)  BP: 158/79 (25 May 2022 12:30) (134/94 - 158/79)  BP(mean): 111 (25 May 2022 12:30) (111 - 117)  RR: 26 (25 May 2022 12:30) (20 - 26)  SpO2: 99% (25 May 2022 12:30) (95% - 100%)    Gen:  Awake, alert   CNS: non focal 	  Neck: no JVD  RES : clear , no wheezing                      CVS: Regular  rhythm. Normal S1/S2  Abd: Soft, non-distended. Bowel sounds present.  Skin: No rash.  Ext:  no edema    ============================I/O===========================   I&O's Detail    ============================ LABS =========================                        14.6   17.28 )-----------( 369      ( 25 May 2022 07:50 )             50.3     05-25    140  |  105  |  30<H>  ----------------------------<  268<H>  4.6   |  29  |  1.70<H>    Ca    9.8      25 May 2022 07:50    TPro  8.2  /  Alb  4.2  /  TBili  0.3  /  DBili  x   /  AST  34  /  ALT  38  /  AlkPhos  107  05-25    LIVER FUNCTIONS - ( 25 May 2022 07:50 )  Alb: 4.2 g/dL / Pro: 8.2 g/dL / ALK PHOS: 107 U/L / ALT: 38 U/L / AST: 34 U/L / GGT: x             ABG - ( 25 May 2022 10:01 )  pH, Arterial: 7.24  pH, Blood: x     /  pCO2: 63    /  pO2: 66    / HCO3: 27    / Base Excess: -0.4  /  SaO2: 92.1                ======================Micro/Rad/Cardio=================  Culture: Reviewed   CXR: Reviewed  Echo:Reviewed  ======================================================  PAST MEDICAL & SURGICAL HISTORY:  Diabetes Mellitus, Type II      CAD (Coronary Artery Disease)  s/p 3v CABG 2004; stents placed in [place] in 2019      Dyslipidemia      Osteomyelitis      COPD (chronic obstructive pulmonary disease)  on 2L at home and BiPAP at night; intubated 6/18      Hypertension      PVD (peripheral vascular disease)      History of PAT (paroxysmal atrial tachycardia)      CABG (Coronary Artery Bypass Graft)  2004      Compound fracture  left leg      S/P primary angioplasty with coronary stent          RESTRAINTS  ======================================================  I have evaluated this patient and have determined that restraints are warranted to optimize medical care. Patient was assessed for current physical and psychological risk factors as well as special needs. There are no medical conditions or limitations that would place this patient at risk while in restraints.  Type of restraint: bilat is wrist. The behavioral criteria for discontinuation of restraint:   -to prevent pulling at ET tube or other lines in place.   -combative aggressive behavior causing harm to pt themselves and staff   -The Attending was notified about the restraints     ====================ASSESSMENT ==============  1.   2.   3.   4.     Plan:  ====================== NEUROLOGY=====================    ==================== RESPIRATORY======================  Mechanical Ventilation:    ALBUTerol    0.083% 2.5 milliGRAM(s) Nebulizer every 6 hours PRN Shortness of Breath and/or Wheezing  albuterol/ipratropium for Nebulization 3 milliLiter(s) Nebulizer every 6 hours  budesonide 160 MICROgram(s)/formoterol 4.5 MICROgram(s) Inhaler 2 Puff(s) Inhalation two times a day  montelukast 10 milliGRAM(s) Oral daily    ====================CARDIOVASCULAR==================  losartan 25 milliGRAM(s) Oral daily  metoprolol tartrate 25 milliGRAM(s) Oral every 12 hours  tamsulosin 0.4 milliGRAM(s) Oral at bedtime    ===================HEMATOLOGIC/ONC ===================  apixaban 2.5 milliGRAM(s) Oral every 12 hours  clopidogrel Tablet 75 milliGRAM(s) Oral daily    ===================== RENAL =========================  Continue monitoring urine output    ==================== GASTROINTESTINAL===================  dextrose 5%. 1000 milliLiter(s) (50 mL/Hr) IV Continuous <Continuous>  dextrose 5%. 1000 milliLiter(s) (100 mL/Hr) IV Continuous <Continuous>    =======================    ENDOCRINE  =====================  atorvastatin 80 milliGRAM(s) Oral at bedtime  dextrose 50% Injectable 25 Gram(s) IV Push once  dextrose 50% Injectable 12.5 Gram(s) IV Push once  dextrose 50% Injectable 25 Gram(s) IV Push once  dextrose Oral Gel 15 Gram(s) Oral once PRN Blood Glucose LESS THAN 70 milliGRAM(s)/deciliter  glucagon  Injectable 1 milliGRAM(s) IntraMuscular once  insulin lispro (ADMELOG) corrective regimen sliding scale   SubCutaneous three times a day before meals  insulin lispro (ADMELOG) corrective regimen sliding scale   SubCutaneous at bedtime  methylPREDNISolone sodium succinate Injectable 40 milliGRAM(s) IV Push every 8 hours    ========================INFECTIOUS DISEASE================  azithromycin   Tablet 250 milliGRAM(s) Oral <User Schedule>  ciprofloxacin     Tablet 500 milliGRAM(s) Oral every 12 hours      Patient requires continuous monitoring with bedside rhythm monitoring, pulse oximetry, ventilator/ bipap  monitoring and intermittent blood gas analysis.    Care plan discussed with ICU care team.    Patient remained critical; required more than usual care; I have spent 35 minutes providing non-routine care, revaluated multiple times during the day.     YUE COTTO  [id number]  Patient is a 82y old  Male who presents with a chief complaint of COPD exacerbation (25 May 2022 11:38)    INCOMPLETE NOTE     HPI:  This is an 81 y/o M with a PMHx of COPD (on home O2 prn and nocturnal BIPAP), CAD (s/p 3v CABG 2004, stents in 2019), HLD, T2DM, HTN, PAT (on Eliquis), PVD (s/p stenting in b/l legs), L femur chronic osteomyelitis (s/p recent hospitalization for intramedullary abscess drainage, now on chronic ciprofloxacin), presenting with 1 day of increasing respiratory distress. At home, pt started feeling short of breath last night, with worsening symptoms into this morning. SpO2 was in the low 80s despite increasing home O2 to 6 L. Pt was brought in by EMS on CPAP, with some symptomatic improvement. Pt currently feels a little uncomfortable with the BiPAP mask on, but otherwise notes improvement in his breathing. As per wife, the only symptoms he had prior to the onset of his respiratory distress were fatigue and rhinorrhea. Of note, pt has environmental allergies and at home, his room air conditioner was turned on for the first time since the fall. Pt denies any fevers, chills, chest pain, palpitations, abd pain, constipation, diarrhea, dysuria. Pt has no other complaints or concerns at this time.    Events in last 24 hours:   Pt presented at home with SOB and hypoxia, as per wife (SpO2 low 80's on 6L NC) and was brought in to the ER and found to be hypercapnic with pH 7.1 and pCO2 97 on VGB, CXR with hyperinflated lungs, but otherwise clear. Pt was placed on BiPAP 18/7 and 21%. ICU c/s was called for hypercarbia while on BiPAP. Pt was examined and assessed at the bedside and was noted to be awake alert and oriented X3, RR 20-30's, Vt 500-600 on BiPAP. Pt was noted to have some belly breathing on NIPPV and had expiratory wheezes throughout on exam. 3rd ABG was drawn and pH remained at 7.24 and pCO2 63. Pt was admitted to the ICU for hypercarbic RF in the setting of COPD vs. asthma exacerbation, BiPAP dependant at this time.     REVIEW OF SYSTEMS:    Gen: No fever  EYES/ENT: No visual changes;  No vertigo or throat pain   NECK: No pain   RES:  No shortness of breath or Cough  CARD: No chest pain   GI: No abdominal pain  : No dysuria  NEURO: No weakness  SKIN: No itching, rashes     Physical Exam:  Vital Signs Last 24 Hrs  T(C): 36.4 (25 May 2022 12:22), Max: 36.5 (25 May 2022 12:19)  T(F): 97.6 (25 May 2022 12:22), Max: 97.7 (25 May 2022 12:19)  HR: 123 (25 May 2022 12:30) (105 - 138)  BP: 158/79 (25 May 2022 12:30) (134/94 - 158/79)  BP(mean): 111 (25 May 2022 12:30) (111 - 117)  RR: 26 (25 May 2022 12:30) (20 - 26)  SpO2: 99% (25 May 2022 12:30) (95% - 100%)    Gen: elderly male  NAD, on BiPAP   CNS: non focal, awake, alert, oriented   Neck: no JVD  RES : diffuse expiratory wheezes throughout                   CVS: Regular  rhythm. Normal S1/S2  Abd: Soft, non-distended. Bowel sounds present.  Skin: No rash.  Ext:  no edema BL chronic venous stasis     ============================I/O===========================   I&O's Detail    ============================ LABS =========================                        14.6   17.28 )-----------( 369      ( 25 May 2022 07:50 )             50.3     05-25    140  |  105  |  30<H>  ----------------------------<  268<H>  4.6   |  29  |  1.70<H>    Ca    9.8      25 May 2022 07:50    TPro  8.2  /  Alb  4.2  /  TBili  0.3  /  DBili  x   /  AST  34  /  ALT  38  /  AlkPhos  107  05-25    LIVER FUNCTIONS - ( 25 May 2022 07:50 )  Alb: 4.2 g/dL / Pro: 8.2 g/dL / ALK PHOS: 107 U/L / ALT: 38 U/L / AST: 34 U/L / GGT: x             ABG - ( 25 May 2022 10:01 )  pH, Arterial: 7.24  pH, Blood: x     /  pCO2: 63    /  pO2: 66    / HCO3: 27    / Base Excess: -0.4  /  SaO2: 92.1                ======================Micro/Rad/Cardio=================  Culture: pending    CXR: < from: Xray Chest 1 View-PORTABLE IMMEDIATE (05.25.22 @ 07:51) >  IMPRESSION: Arterial deficiency noted within the upper lung fields and   flattening of the diaphragm suggest COPD/emphysema. There is a suggestion   for nodular opacities noted overlying the upper lung fields on this   limited portable radiograph, indeterminate. Follow-up PA and lateral   radiography of the chest recommended.    < end of copied text >      ======================================================  PAST MEDICAL & SURGICAL HISTORY:  Diabetes Mellitus, Type II      CAD (Coronary Artery Disease)  s/p 3v CABG 2004; stents placed [place] in [place]      Dyslipidemia      Osteomyelitis      COPD (chronic obstructive pulmonary disease)  on 2L at home and BiPAP at night; intubated 6/18      Hypertension      PVD (peripheral vascular disease)      History of PAT (paroxysmal atrial tachycardia)      CABG (Coronary Artery Bypass Graft)  2004      Compound fracture  left leg      S/P primary angioplasty with coronary stent      ====================ASSESSMENT ==============  1. Hypercapnic Respiratory Failure   2. COPD vs. Asthma exacerbation   3. MERRILL ATN       Plan:  ====================== NEUROLOGY=====================  -JELLY   ==================== RESPIRATORY======================  BiPAP 18/7 21%,   ALBUTerol    0.083% 2.5 milliGRAM(s) Nebulizer every 6 hours PRN Shortness of Breath and/or Wheezing  albuterol/ipratropium for Nebulization 3 milliLiter(s) Nebulizer every 6 hours  budesonide 160 MICROgram(s)/formoterol 4.5 MICROgram(s) Inhaler 2 Puff(s) Inhalation two times a day  montelukast 10 milliGRAM(s) Oral daily    ====================CARDIOVASCULAR==================  losartan 25 milliGRAM(s) Oral daily  metoprolol tartrate 25 milliGRAM(s) Oral every 12 hours  tamsulosin 0.4 milliGRAM(s) Oral at bedtime    ===================HEMATOLOGIC/ONC ===================  apixaban 2.5 milliGRAM(s) Oral every 12 hours  clopidogrel Tablet 75 milliGRAM(s) Oral daily    ===================== RENAL =========================  -Check repeat labs and monitor renal function trend  -Avoid hypotension and optimize BP with IVF and pressor support if needed.   -Avoid nephrotoxic meds as possible. Avoid ACEI, ARB and NSAIDS  -Monitor input and output    ==================== GASTROINTESTINAL===================  dextrose 5%. 1000 milliLiter(s) (50 mL/Hr) IV Continuous <Continuous>  dextrose 5%. 1000 milliLiter(s) (100 mL/Hr) IV Continuous <Continuous>    =======================    ENDOCRINE  =====================  -Aggressive glycemic control to limit FS glucose to < 180mg/dl, BS stable.  -SSI    ========================INFECTIOUS DISEASE================  azithromycin   Tablet 250 milliGRAM(s) Oral <User Schedule>  ciprofloxacin     Tablet 500 milliGRAM(s) Oral every 12 hours        Critical Care time: 40 mins assessing presenting problems of acute illness that poses high probability of life threatening deterioration or end organ damage/dysfunction.  Medical decision making including Initiating plan of care, reviewing data, reviewing radiology, direct patient bedside evaluation and interpretation of vital signs, any necessary ventilator management , discussion with multidisciplinary team, discussing goals of care with patient/family, all non inclusive of procedures      YUE COTTO  MRN-[id number]  Patient is a 82y old  Male who presents with a chief complaint of COPD exacerbation (25 May 2022 11:38)    INCOMPLETE NOTE     HPI:  This is an 83 y/o M with a PMHx of COPD (on home O2 prn and nocturnal BIPAP), CAD (s/p 3v CABG 2004, stents in 2019), HLD, T2DM, HTN, PAT (on Eliquis), PVD (s/p stenting in b/l legs), L femur chronic osteomyelitis (s/p recent hospitalization for intramedullary abscess drainage, now on chronic ciprofloxacin), presenting with 1 day of increasing respiratory distress. At home, pt started feeling short of breath last night, with worsening symptoms into this morning. SpO2 was in the low 80s despite increasing home O2 to 6 L. Pt was brought in by EMS on CPAP, with some symptomatic improvement. Pt currently feels a little uncomfortable with the BiPAP mask on, but otherwise notes improvement in his breathing. As per wife, the only symptoms he had prior to the onset of his respiratory distress were fatigue and rhinorrhea. Of note, pt has environmental allergies and at home, his room air conditioner was turned on for the first time since the fall. Pt denies any fevers, chills, chest pain, palpitations, abd pain, constipation, diarrhea, dysuria. Pt has no other complaints or concerns at this time.    Events in last 24 hours:   Pt presented at home with SOB and hypoxia, as per wife (SpO2 low 80's on 6L NC) and was brought in to the ER and found to be hypercapnic with pH 7.1 and pCO2 97 on VGB, CXR with hyperinflated lungs, but otherwise clear. Pt was placed on BiPAP 18/7 and 21%. ICU c/s was called for hypercarbia while on BiPAP. Pt was examined and assessed at the bedside and was noted to be awake alert and oriented X3, RR 20-30's, Vt 500-600 on BiPAP. Pt was noted to have some belly breathing on NIPPV and had expiratory wheezes throughout on exam. 3rd ABG was drawn and pH remained at 7.24 and pCO2 63. Pt was admitted to the ICU for hypercarbic RF in the setting of COPD vs. asthma exacerbation, BiPAP dependant at this time.     REVIEW OF SYSTEMS:    Gen: No fever  EYES/ENT: No visual changes;  No vertigo or throat pain   NECK: No pain   RES:  mild shortness of breath, admits to improvement on BipAP   CARD: No chest pain   GI: No abdominal pain  : No dysuria  NEURO: No weakness  SKIN: No itching, rashes     Physical Exam:  Vital Signs Last 24 Hrs  T(C): 36.4 (25 May 2022 12:22), Max: 36.5 (25 May 2022 12:19)  T(F): 97.6 (25 May 2022 12:22), Max: 97.7 (25 May 2022 12:19)  HR: 123 (25 May 2022 12:30) (105 - 138)  BP: 158/79 (25 May 2022 12:30) (134/94 - 158/79)  BP(mean): 111 (25 May 2022 12:30) (111 - 117)  RR: 26 (25 May 2022 12:30) (20 - 26)  SpO2: 99% (25 May 2022 12:30) (95% - 100%)    Gen: elderly male  NAD, on BiPAP   CNS: non focal, awake, alert, oriented   Neck: no JVD  RES : diffuse expiratory wheezes throughout                   CVS: Regular  rhythm. Normal S1/S2  Abd: Soft, non-distended. Bowel sounds present.  Skin: No rash.  Ext:  no edema BL chronic venous stasis     ============================I/O===========================   I&O's Detail    ============================ LABS =========================                        14.6   17.28 )-----------( 369      ( 25 May 2022 07:50 )             50.3     05-25    140  |  105  |  30<H>  ----------------------------<  268<H>  4.6   |  29  |  1.70<H>    Ca    9.8      25 May 2022 07:50    TPro  8.2  /  Alb  4.2  /  TBili  0.3  /  DBili  x   /  AST  34  /  ALT  38  /  AlkPhos  107  05-25    LIVER FUNCTIONS - ( 25 May 2022 07:50 )  Alb: 4.2 g/dL / Pro: 8.2 g/dL / ALK PHOS: 107 U/L / ALT: 38 U/L / AST: 34 U/L / GGT: x             ABG - ( 25 May 2022 10:01 )  pH, Arterial: 7.24  pH, Blood: x     /  pCO2: 63    /  pO2: 66    / HCO3: 27    / Base Excess: -0.4  /  SaO2: 92.1                ======================Micro/Rad/Cardio=================  Culture: pending    CXR: < from: Xray Chest 1 View-PORTABLE IMMEDIATE (05.25.22 @ 07:51) >  IMPRESSION: Arterial deficiency noted within the upper lung fields and   flattening of the diaphragm suggest COPD/emphysema. There is a suggestion   for nodular opacities noted overlying the upper lung fields on this   limited portable radiograph, indeterminate. Follow-up PA and lateral   radiography of the chest recommended.    < end of copied text >      ======================================================  PAST MEDICAL & SURGICAL HISTORY:  Diabetes Mellitus, Type II      CAD (Coronary Artery Disease)  s/p 3v CABG 2004; stents placed [place] in [place]      Dyslipidemia      Osteomyelitis      COPD (chronic obstructive pulmonary disease)  on 2L at home and BiPAP at night; intubated 6/18      Hypertension      PVD (peripheral vascular disease)      History of PAT (paroxysmal atrial tachycardia)      CABG (Coronary Artery Bypass Graft)  2004      Compound fracture  left leg      S/P primary angioplasty with coronary stent      ====================ASSESSMENT ==============  1. Hypercapnic Respiratory Failure   2. COPD vs. Asthma exacerbation   3. MERRILL ATN       Plan:  ====================== NEUROLOGY=====================  -JELLY   ==================== RESPIRATORY======================  BiPAP 18/7 21%,   -Repeat ABG for this afternoon   -Pt  to recieve scheduled Duonebs/PRN albuterol and home inhaled steroids  -Pt s/p 125 mg solumedrol IVP + 40 mg IVP in ER and started on 40 mg q8h routine   ALBUTerol    0.083% 2.5 milliGRAM(s) Nebulizer every 6 hours PRN Shortness of Breath and/or Wheezing  albuterol/ipratropium for Nebulization 3 milliLiter(s) Nebulizer every 6 hours  budesonide 160 MICROgram(s)/formoterol 4.5 MICROgram(s) Inhaler 2 Puff(s) Inhalation two times a day  montelukast 10 milliGRAM(s) Oral daily    ====================CARDIOVASCULAR==================  -PT has h/o CABG PVD and PAT on Eliquis as well as HTN   -Pt was started on home eliquis (renally dosed), home lopressor and plavix  metoprolol tartrate 25 milliGRAM(s) Oral every 12 hours  tamsulosin 0.4 milliGRAM(s) Oral at bedtime    ===================HEMATOLOGIC/ONC ===================  apixaban 2.5 milliGRAM(s) Oral every 12 hours  clopidogrel Tablet 75 milliGRAM(s) Oral daily    ===================== RENAL =========================  -Check repeat labs and monitor renal function trend  -Avoid hypotension and optimize BP with IVF and pressor support if needed.   -Avoid nephrotoxic meds as possible. Avoid ACEI, ARB and NSAIDS  -Monitor input and output    ==================== GASTROINTESTINAL===================  -NPO while on BiPAP  -If able to liberate from BiPAP will start on Diabetic diet       =======================    ENDOCRINE  =====================  -Aggressive glycemic control to limit FS glucose to < 180mg/dl, BS stable.  -SSI    ========================INFECTIOUS DISEASE================  Pending Bcx   -Monitor WBC and fever curve   -Pt is on chronic, azithromycin   Tablet 250 milliGRAM(s) Oral, and chronic, and chronic ciprofloxacin     Tablet 500 milliGRAM(s) Oral every 12 hours for h/o OM         Critical Care time: 40 mins assessing presenting problems of acute illness that poses high probability of life threatening deterioration or end organ damage/dysfunction.  Medical decision making including Initiating plan of care, reviewing data, reviewing radiology, direct patient bedside evaluation and interpretation of vital signs, any necessary ventilator management , discussion with multidisciplinary team, discussing goals of care with patient/family, all non inclusive of procedures

## 2022-05-25 NOTE — H&P ADULT - NSHPOUTPATIENTPROVIDERS_GEN_ALL_CORE
PCP: Dr. Sarah Avila  Cardiology: Dr. Rod  Vascular: Dr. Buckley  Pulm: Dr. Marcelino  ID: Dr. [email or web address]

## 2022-05-25 NOTE — H&P ADULT - PROBLEM SELECTOR PLAN 7
Continue home losartan 25 mg daily and Lopressor 25 mg q12h with hold parameters  - Monitor routine hemodynamics Continue Lopressor 25 mg q12h with hold parameters  - Monitor routine hemodynamics

## 2022-05-25 NOTE — H&P ADULT - PROBLEM SELECTOR PLAN 2
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

## 2022-05-25 NOTE — H&P ADULT - PROBLEM SELECTOR PLAN 4
S/p CABG in 2004, stents in 2019  - Continue home Lopressor 25 mg BID with hold parameters  - Continue home statin as atorvastatin via therapeutic interchange  - Pt not on aspirin as per med rec, hold while inpatient, continue home Plavix 75 mg daily

## 2022-05-25 NOTE — ED PROVIDER NOTE - PROGRESS NOTE DETAILS
pt feeling better on BIPAP, still poor air movement pt oxygen saturation 100% on FiO2 of 21% but requiring pressure

## 2022-05-25 NOTE — H&P ADULT - PROBLEM SELECTOR PLAN 8
Continue Lopressor 25 mg BID with hold parameters  - Pt on Eliquis 5 mg BID at home, continue as 2.5 mg BID while inpatient as pt has MERRILL

## 2022-05-25 NOTE — CHART NOTE - NSCHARTNOTEFT_GEN_A_CORE
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 Wife reports that she recently turned on the a/c unit in their home, also reports construction in front of the house and possible exposure, also patient has known springtime allergies. He was noted to be hypoxemic and started on supplemental oxygen, but hypoxemia worsened and EMS contacted. He was then started on NIPPV. In the ED, he was afebrile and hemodynamically stable with sinus tachycardia. He was placed on BiPAP with minimal improvement in hypercapnia. Now admitted to ICU. Presentation consistent with acute on chronic hypercapnic respiratory failure due to recurrent asthma-COPD exacerbation (note last exacerbation in Sept 2021).    Admit to ICU  1. Neuro: acute encephalopathy due to hypercapnia, now improving  2. CV: HD stable, continue apixaban for history of A-fib. Continue clopidogrel, rosuvastatin, losartan, and metoprolol  3. Pulm: continue BiPAP 18/8, FiO2 25-30%. Serial ABG. Methylprednisolone 40 mg IV q8h today, then taper (s/p 125 mg in ED). Symbicort 160-4.5 mcg 2 puffs twice daily, rinse after use. Albuterol and ipratropium nebs q6h. Hold Spiriva while on ipratropium. Montelukast 10 mg qhs. Azithromycin 250 mg MWF. On mepolizumab injections monthly (next due 6/3). If hypercapnia improves, can transition to NC@2-4 LPM with goal SpO2>88-90%. Avoid hyperoxia  4. GI: NPO today while on BiPAP  5. Renal: MERRILL on CKD, close monitoring of kidney function and lytes, strict I/O's  6. ID: continue ciprofloxacin for chronic OM, f/up ID  7. Heme: on apixaban, restart at 2.5 mg bid given elevated Cr 1.7 (baseline 1.4-1.6)  8. Endo: DM2, continue insulin coverage scale, check A1C  9. Skin: no lines or cunha  10. Dispo: full code, discussed with patient and wife Sania at bedside, prognosis guarded  CC time spent: 35 min

## 2022-05-25 NOTE — H&P ADULT - PROBLEM SELECTOR PLAN 10
Pt on metformin 1000 mg BID and Jardiance 25 mg daily, hold while inpatient  - Start sliding-scale insulin while inpatient, hypoglycemia protocol  - POCT blood glucose monitoring, goal 140-180 while in ICU

## 2022-05-25 NOTE — ED PROVIDER NOTE - OBJECTIVE STATEMENT
pt with h/o COPD on home oxygen 2 LPM, since yesterday has had increasing SOB and this morning was in distress with oxygen level in the low 80% range without any improvement with increasing LPM to 6, pt BIBEMS on CPAP and pt states that he is feeling somewhat better. no h/o CHF, has had frequent ED visits for COPD exacerbation. denies fever, productive cough, n/v/d, cp.  has chronic osteomyelitis of the L femur for which he takes cipro.     PMD: Sarah Avila  Pulm: [place]

## 2022-05-25 NOTE — H&P ADULT - NSHPPHYSICALEXAM_GEN_ALL_CORE
T(C): 35.4 (05-25-22 @ 06:58), Max: 35.4 (05-25-22 @ 06:58)  HR: 115 (05-25-22 @ 10:00) (105 - 138)  BP: 136/79 (05-25-22 @ 10:00) (134/94 - 136/79)  RR: 26 (05-25-22 @ 06:58) (26 - 26)  SpO2: 100% (05-25-22 @ 07:00) (96% - 100%)    GENERAL: patient appears well, no acute distress, appropriate, pleasant  EYES: sclera clear, no exudates  ENMT: oropharynx clear without erythema, no exudates, moist mucous membranes  NECK: supple, soft, no thyromegaly noted  LUNGS: Increased work of breathing; good air entry bilaterally, clear to auscultation, symmetric breath sounds, no wheezing or rhonchi appreciated; On BiPAP  HEART: soft S1/S2, tachycardic, regular rhythm, no murmurs noted, no lower extremity edema  GASTROINTESTINAL: abdomen is soft, nontender, nondistended, normoactive bowel sounds, no palpable masses  INTEGUMENT: good skin turgor, warm, dry, normal color for race.  MUSCULOSKELETAL: no clubbing or cyanosis, no obvious deformity  NEUROLOGIC: awake, alert, oriented x3, good muscle tone in 4 extremities, no obvious sensory deficits  PSYCHIATRIC: mood is good, affect is congruent, linear and logical thought process  HEME/LYMPH: no obvious ecchymosis or petechiae

## 2022-05-25 NOTE — H&P ADULT - CONVERSATION DETAILS
Diagnosis of COPD exacerbation, including potential triggers, current clinical status and treatment plan, including need for ICU admission and monitoring, explained to patient and family.    Pt's HCP is his wife, Sania Donohue ([phone])    Pt wishes to be full code (CPR and intubation if necessary in emergency situations). Diagnosis of COPD exacerbation, including potential triggers, current clinical status and treatment plan, including need for ICU admission and monitoring, explained to patient and family.    Pt's HCP is his wife, Sania Donohue ([phone])    Pt wishes to be full code (CPR and intubation if necessary in emergency situations).    Patients code status was discussed with patient at bedside. Goals of care were discussed with patient including the importance of having an advance directive and Health Care agent. Goals of care discussed included need for possible intubation and CPR if patient clinically deteriorates. Patient was informed about the role of a MOLST form.   17  minutes were spent discussing advanced care planning and this was separate from management of patients medical problems.

## 2022-05-26 LAB
A1C WITH ESTIMATED AVERAGE GLUCOSE RESULT: 6.9 % — HIGH (ref 4–5.6)
ALBUMIN SERPL ELPH-MCNC: 3.8 G/DL — SIGNIFICANT CHANGE UP (ref 3.3–5)
ALP SERPL-CCNC: 88 U/L — SIGNIFICANT CHANGE UP (ref 40–120)
ALT FLD-CCNC: 31 U/L — SIGNIFICANT CHANGE UP (ref 12–78)
ANION GAP SERPL CALC-SCNC: 6 MMOL/L — SIGNIFICANT CHANGE UP (ref 5–17)
AST SERPL-CCNC: 17 U/L — SIGNIFICANT CHANGE UP (ref 15–37)
BASE EXCESS BLDA CALC-SCNC: 5.8 MMOL/L — HIGH (ref -2–3)
BASOPHILS # BLD AUTO: 0.01 K/UL — SIGNIFICANT CHANGE UP (ref 0–0.2)
BASOPHILS NFR BLD AUTO: 0.1 % — SIGNIFICANT CHANGE UP (ref 0–2)
BILIRUB SERPL-MCNC: 0.3 MG/DL — SIGNIFICANT CHANGE UP (ref 0.2–1.2)
BLOOD GAS COMMENTS ARTERIAL: SIGNIFICANT CHANGE UP
BUN SERPL-MCNC: 34 MG/DL — HIGH (ref 7–23)
CALCIUM SERPL-MCNC: 10.2 MG/DL — HIGH (ref 8.5–10.1)
CHLORIDE SERPL-SCNC: 107 MMOL/L — SIGNIFICANT CHANGE UP (ref 96–108)
CO2 SERPL-SCNC: 32 MMOL/L — HIGH (ref 22–31)
CREAT SERPL-MCNC: 1.7 MG/DL — HIGH (ref 0.5–1.3)
EGFR: 40 ML/MIN/1.73M2 — LOW
EOSINOPHIL # BLD AUTO: 0 K/UL — SIGNIFICANT CHANGE UP (ref 0–0.5)
EOSINOPHIL NFR BLD AUTO: 0 % — SIGNIFICANT CHANGE UP (ref 0–6)
ESTIMATED AVERAGE GLUCOSE: 151 MG/DL — HIGH (ref 68–114)
GLUCOSE SERPL-MCNC: 171 MG/DL — HIGH (ref 70–99)
HCO3 BLDA-SCNC: 30 MMOL/L — HIGH (ref 21–28)
HCT VFR BLD CALC: 45.4 % — SIGNIFICANT CHANGE UP (ref 39–50)
HGB BLD-MCNC: 13.3 G/DL — SIGNIFICANT CHANGE UP (ref 13–17)
HOROWITZ INDEX BLDA+IHG-RTO: 21 — SIGNIFICANT CHANGE UP
IMM GRANULOCYTES NFR BLD AUTO: 0.6 % — SIGNIFICANT CHANGE UP (ref 0–1.5)
INR BLD: 1.08 RATIO — SIGNIFICANT CHANGE UP (ref 0.88–1.16)
LYMPHOCYTES # BLD AUTO: 0.88 K/UL — LOW (ref 1–3.3)
LYMPHOCYTES # BLD AUTO: 8.2 % — LOW (ref 13–44)
MAGNESIUM SERPL-MCNC: 2.9 MG/DL — HIGH (ref 1.6–2.6)
MCHC RBC-ENTMCNC: 26.8 PG — LOW (ref 27–34)
MCHC RBC-ENTMCNC: 29.3 GM/DL — LOW (ref 32–36)
MCV RBC AUTO: 91.5 FL — SIGNIFICANT CHANGE UP (ref 80–100)
MONOCYTES # BLD AUTO: 1.42 K/UL — HIGH (ref 0–0.9)
MONOCYTES NFR BLD AUTO: 13.3 % — SIGNIFICANT CHANGE UP (ref 2–14)
NEUTROPHILS # BLD AUTO: 8.3 K/UL — HIGH (ref 1.8–7.4)
NEUTROPHILS NFR BLD AUTO: 77.8 % — HIGH (ref 43–77)
NRBC # BLD: 0 /100 WBCS — SIGNIFICANT CHANGE UP (ref 0–0)
PCO2 BLDA: 48 MMHG — SIGNIFICANT CHANGE UP (ref 35–48)
PH BLDA: 7.41 — SIGNIFICANT CHANGE UP (ref 7.35–7.45)
PHOSPHATE SERPL-MCNC: 4.3 MG/DL — SIGNIFICANT CHANGE UP (ref 2.5–4.5)
PLATELET # BLD AUTO: 309 K/UL — SIGNIFICANT CHANGE UP (ref 150–400)
PO2 BLDA: 82 MMHG — LOW (ref 83–108)
POTASSIUM SERPL-MCNC: 4.5 MMOL/L — SIGNIFICANT CHANGE UP (ref 3.5–5.3)
POTASSIUM SERPL-SCNC: 4.5 MMOL/L — SIGNIFICANT CHANGE UP (ref 3.5–5.3)
PROT SERPL-MCNC: 7.3 G/DL — SIGNIFICANT CHANGE UP (ref 6–8.3)
PROTHROM AB SERPL-ACNC: 12.7 SEC — SIGNIFICANT CHANGE UP (ref 10.5–13.4)
RBC # BLD: 4.96 M/UL — SIGNIFICANT CHANGE UP (ref 4.2–5.8)
RBC # FLD: 14.4 % — SIGNIFICANT CHANGE UP (ref 10.3–14.5)
SAO2 % BLDA: 97.1 % — SIGNIFICANT CHANGE UP (ref 94–98)
SODIUM SERPL-SCNC: 145 MMOL/L — SIGNIFICANT CHANGE UP (ref 135–145)
WBC # BLD: 10.67 K/UL — HIGH (ref 3.8–10.5)
WBC # FLD AUTO: 10.67 K/UL — HIGH (ref 3.8–10.5)

## 2022-05-26 PROCEDURE — 99232 SBSQ HOSP IP/OBS MODERATE 35: CPT

## 2022-05-26 PROCEDURE — 99233 SBSQ HOSP IP/OBS HIGH 50: CPT

## 2022-05-26 PROCEDURE — 99233 SBSQ HOSP IP/OBS HIGH 50: CPT | Mod: GC

## 2022-05-26 RX ORDER — SACCHAROMYCES BOULARDII 250 MG
250 POWDER IN PACKET (EA) ORAL
Refills: 0 | Status: DISCONTINUED | OUTPATIENT
Start: 2022-05-26 | End: 2022-05-28

## 2022-05-26 RX ORDER — PANTOPRAZOLE SODIUM 20 MG/1
40 TABLET, DELAYED RELEASE ORAL
Refills: 0 | Status: DISCONTINUED | OUTPATIENT
Start: 2022-05-26 | End: 2022-05-28

## 2022-05-26 RX ORDER — CHLORHEXIDINE GLUCONATE 213 G/1000ML
1 SOLUTION TOPICAL DAILY
Refills: 0 | Status: DISCONTINUED | OUTPATIENT
Start: 2022-05-26 | End: 2022-05-28

## 2022-05-26 RX ORDER — INSULIN GLARGINE 100 [IU]/ML
8 INJECTION, SOLUTION SUBCUTANEOUS EVERY MORNING
Refills: 0 | Status: DISCONTINUED | OUTPATIENT
Start: 2022-05-26 | End: 2022-05-28

## 2022-05-26 RX ADMIN — Medication 2: at 08:12

## 2022-05-26 RX ADMIN — APIXABAN 2.5 MILLIGRAM(S): 2.5 TABLET, FILM COATED ORAL at 05:39

## 2022-05-26 RX ADMIN — MONTELUKAST 10 MILLIGRAM(S): 4 TABLET, CHEWABLE ORAL at 11:42

## 2022-05-26 RX ADMIN — Medication 40 MILLIGRAM(S): at 05:38

## 2022-05-26 RX ADMIN — Medication 6: at 17:06

## 2022-05-26 RX ADMIN — CHLORHEXIDINE GLUCONATE 1 APPLICATION(S): 213 SOLUTION TOPICAL at 11:47

## 2022-05-26 RX ADMIN — ATORVASTATIN CALCIUM 80 MILLIGRAM(S): 80 TABLET, FILM COATED ORAL at 21:01

## 2022-05-26 RX ADMIN — BUDESONIDE AND FORMOTEROL FUMARATE DIHYDRATE 2 PUFF(S): 160; 4.5 AEROSOL RESPIRATORY (INHALATION) at 21:01

## 2022-05-26 RX ADMIN — CLOPIDOGREL BISULFATE 75 MILLIGRAM(S): 75 TABLET, FILM COATED ORAL at 11:42

## 2022-05-26 RX ADMIN — INSULIN GLARGINE 8 UNIT(S): 100 INJECTION, SOLUTION SUBCUTANEOUS at 09:27

## 2022-05-26 RX ADMIN — Medication 6: at 11:56

## 2022-05-26 RX ADMIN — BUDESONIDE AND FORMOTEROL FUMARATE DIHYDRATE 2 PUFF(S): 160; 4.5 AEROSOL RESPIRATORY (INHALATION) at 09:26

## 2022-05-26 RX ADMIN — TAMSULOSIN HYDROCHLORIDE 0.4 MILLIGRAM(S): 0.4 CAPSULE ORAL at 21:01

## 2022-05-26 RX ADMIN — Medication 3 MILLILITER(S): at 00:57

## 2022-05-26 RX ADMIN — Medication 3 MILLILITER(S): at 20:27

## 2022-05-26 RX ADMIN — Medication 25 MILLIGRAM(S): at 17:07

## 2022-05-26 RX ADMIN — Medication 500 MILLIGRAM(S): at 17:06

## 2022-05-26 RX ADMIN — Medication 500 MILLIGRAM(S): at 05:39

## 2022-05-26 RX ADMIN — Medication 25 MILLIGRAM(S): at 05:39

## 2022-05-26 RX ADMIN — Medication 250 MILLIGRAM(S): at 17:06

## 2022-05-26 RX ADMIN — Medication 3 MILLILITER(S): at 08:01

## 2022-05-26 RX ADMIN — APIXABAN 2.5 MILLIGRAM(S): 2.5 TABLET, FILM COATED ORAL at 17:06

## 2022-05-26 NOTE — DIETITIAN INITIAL EVALUATION ADULT - REASON FOR ADMISSION
83yo Male with PMH of HTN, COPD, Dyslipidemia, CAD, T2DM. Pt admitted on 5/25 with acute respiratory failure with hypoxia in the setting of COPD exacerbation.

## 2022-05-26 NOTE — PROGRESS NOTE ADULT - ATTENDING COMMENTS
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 & chronic prednisone, former smoker, chronic hypoxemic and hypercapnic respiratory failure on home oxygen and nocturnal BiPAP, history of cardiac arrest in 2018 due to respiratory failure, HTN, HLD, DM2 (not on insulin), CAD s/p 3V-CABG (2004) and PCI (Oct 2019), PVD s/p bilateral LE stents (most recently Nov 2019) on plavix, A-Fib on apixaban, BPH, and left femur fracture with chronic OM (s/p recent drainage in Dec 2021 of intramedullary abscess, on chronic ciprofloxacin) who presents with acute hypercapnic respiratory failure due to recurrent Asthma-COPD exacerbation (note last exacerbation in Sept 2021). Now clinically improved.    1. Neuro: resolved encephalopathy, now at baseline. PT eval, out of bed to chair  2. CV: HD stable, continue apixaban for history of A-fib. Continue clopidogrel, rosuvastatin, losartan, and metoprolol  3. Pulm: continue BiPAP 18/8 qhs. ABG with resolved acute hypercapnia. Taper steroids to prednisone 40 mg daily with slow taper by 10 mg every 3 days, then to resume 2 mg oral daily. Continue Symbicort 160-4.5 mcg 2 puffs twice daily, rinse after use. Albuterol and ipratropium nebs q6h. Hold Spiriva while on ipratropium. Montelukast 10 mg qhs. Azithromycin 250 mg MWF. On mepolizumab injections monthly (next due 6/3). Avoid hyperoxia  4. GI: start consistent carb dash/tlc diet  5. Renal: CKD with stable creatinine 1.7. Close monitoring of kidney function and lytes, strict I/O's  6. ID: continue ciprofloxacin for chronic OM, f/up ID  7. Heme: on apixaban 2.5 mg bid given elevated Cr 1.7  8. Endo: DM2, continue insulin coverage scale, start Lantus 8 qAM. A1C 6.9  9. Skin: no lines or cunha  10. Dispo: full code, discussed with patient and wife Sania at bedside, stable for transfer to floors with continuous pulse ox

## 2022-05-26 NOTE — DIETITIAN INITIAL EVALUATION ADULT - ADD RECOMMEND
1) Continue Consistent carbohydrate, DASH/TLC diet   2) Monitor need for supplements   3) Monitor PO intake, diet tolerance, weight trends, labs, GI function, and skin integrity

## 2022-05-26 NOTE — DIETITIAN INITIAL EVALUATION ADULT - ORAL INTAKE PTA/DIET HISTORY
Pt reports good PO intake PTA; baseline consists of x3 meals/day. Wife prepares meals; follows low sodium, consistent carbohydrate diet. Denies chewing/swallowing difficulties.

## 2022-05-26 NOTE — PROGRESS NOTE ADULT - SUBJECTIVE AND OBJECTIVE BOX
[facility]  INFECTIOUS DISEASES   [address] [place] [address]  Tel: [phone]     Fax: [phone]  ======================================================  MD Noman Perry Kaushal, MD Cho, Michelle, MD   ======================================================    [facility]-[id number]  [facility]     Follow up: COPD exacerbation, Left thigh infection     Doing a lot better, on O2 with mask now, out of ICU.   No fever. leukocytosis improved.     PAST MEDICAL & SURGICAL HISTORY:  Diabetes Mellitus, Type II  CAD (Coronary Artery Disease)  s/p 3v CABG ; stents placed in [place] in   Dyslipidemia  Osteomyelitis  COPD (chronic obstructive pulmonary disease)  on 2L at home and BiPAP at night; intubated   Hypertension  PVD (peripheral vascular disease)  History of PAT (paroxysmal atrial tachycardia)  CABG (Coronary Artery Bypass Graft)    Compound fracture  left leg  S/P primary angioplasty with coronary stent    Social Hx: no current smoking, ETOH or drugs     FAMILY HISTORY:  Family history of diabetes mellitus (Sibling)    Family hx of lung cancer  brother,  age 82, used to smoke with pt    Allergies  No Known Allergies    Antibiotics:  MEDICATIONS  (STANDING):  albuterol/ipratropium for Nebulization 3 milliLiter(s) Nebulizer every 6 hours  apixaban 2.5 milliGRAM(s) Oral every 12 hours  atorvastatin 80 milliGRAM(s) Oral at bedtime  azithromycin   Tablet 250 milliGRAM(s) Oral <User Schedule>  budesonide 160 MICROgram(s)/formoterol 4.5 MICROgram(s) Inhaler 2 Puff(s) Inhalation two times a day  chlorhexidine 4% Liquid 1 Application(s) Topical <User Schedule>  ciprofloxacin     Tablet 500 milliGRAM(s) Oral every 12 hours  clopidogrel Tablet 75 milliGRAM(s) Oral daily  dextrose 5%. 1000 milliLiter(s) (50 mL/Hr) IV Continuous <Continuous>  dextrose 5%. 1000 milliLiter(s) (100 mL/Hr) IV Continuous <Continuous>  dextrose 50% Injectable 25 Gram(s) IV Push once  dextrose 50% Injectable 12.5 Gram(s) IV Push once  dextrose 50% Injectable 25 Gram(s) IV Push once  glucagon  Injectable 1 milliGRAM(s) IntraMuscular once  insulin lispro (ADMELOG) corrective regimen sliding scale   SubCutaneous three times a day before meals  insulin lispro (ADMELOG) corrective regimen sliding scale   SubCutaneous at bedtime  losartan 25 milliGRAM(s) Oral daily  methylPREDNISolone sodium succinate Injectable 40 milliGRAM(s) IV Push every 8 hours  metoprolol tartrate 25 milliGRAM(s) Oral every 12 hours  metoprolol tartrate 25 milliGRAM(s) Oral once  montelukast 10 milliGRAM(s) Oral daily  tamsulosin 0.4 milliGRAM(s) Oral at bedtime    REVIEW OF SYSTEMS:  CONSTITUTIONAL:  No Fever or chills  HEENT:  No diplopia or blurred vision.  No sore throat or runny nose.  CARDIOVASCULAR:  No chest pain or SOB.  RESPIRATORY:  No cough, +shortness of breath,  GASTROINTESTINAL:  No nausea, vomiting or diarrhea.  GENITOURINARY:  No dysuria, frequency or urgency. No Blood in urine  MUSCULOSKELETAL:  no joint aches, no muscle pain  SKIN:  No change in skin, hair or nails.  NEUROLOGIC:  No paresthesias, fasciculations, seizures or weakness.  PSYCHIATRIC:  No disorder of thought or mood.  ENDOCRINE:  No heat or cold intolerance, polyuria or polydipsia.  HEMATOLOGICAL:  No easy bruising or bleeding.     Physical Exam:  Vital Signs Last 24 Hrs  T(C): 36.6 (26 May 2022 11:39), Max: 36.7 (25 May 2022 16:35)  T(F): 97.8 (26 May 2022 11:39), Max: 98 (25 May 2022 16:35)  HR: 88 (26 May 2022 13:10) (79 - 118)  BP: 130/75 (26 May 2022 11:39) (118/65 - 165/78)  BP(mean): 90 (26 May 2022 11:00) (85 - 112)  RR: 19 (26 May 2022 11:39) (14 - 36)  SpO2: 100% (26 May 2022 13:10) (90% - 100%)  HEENT: normocephalic and atraumatic. EOMI. PERRL.    NECK: Supple.  No lymphadenopathy   LUNGS: Decreased breath sounds bilaterally with scattered rhonchi both sides  HEART: Regular rate and rhythm without murmur.  ABDOMEN: Soft, nontender, and nondistended.  Positive bowel sounds.    : No CVA tenderness  EXTREMITIES: left lateral thigh with a healed scar, opening in the middle of scar with some serosanguinous fluid.   NEUROLOGIC: grossly intact.  PSYCHIATRIC: Appropriate affect .  SKIN: No ulceration or induration present.      Labs:                        13.3   10.67 )-----------( 309      ( 26 May 2022 07:27 )             45.4         145  |  107  |  34<H>  ----------------------------<  171<H>  4.5   |  32<H>  |  1.70<H>    Ca    10.2<H>      26 May 2022 07:27  Phos  4.3       Mg     2.9         TPro  7.3  /  Alb  3.8  /  TBili  0.3  /  DBili  x   /  AST  17  /  ALT  31  /  AlkPhos  88      Culture - Blood (collected 22 @ 12:28)  Source: .Blood Blood    Culture - Blood (collected 22 @ 12:28)  Source: .Blood Blood    WBC Count: 10.67 K/uL (22 @ 07:27)  WBC Count: 17.28 K/uL (22 @ 07:50)    Creatinine, Serum: 1.70 mg/dL (22 @ 07:27)  Creatinine, Serum: 1.70 mg/dL (22 @ 07:50)    Procalcitonin, Serum: <0.05 (22 @ 07:50)     SARS-CoV-2: NotDetec (22 @ 07:48)    All imaging and other data have been reviewed.  < from: Xray Chest 1 View-PORTABLE IMMEDIATE (22 @ 07:51) >  IMPRESSION: Arterial deficiency noted within the upper lung fields and   flattening of the diaphragm suggest COPD/emphysema. There is a suggestion   for nodular opacities noted overlying the upper lung fields on this   limited portable radiograph, indeterminate. Follow-up PA and lateral   radiography of the chest recommended.    Assessment and Plan:   83 y/o man with a PMHx of HTN, Dm2, COPD (on home O2 prn and nocturnal BIPAP), CAD, pAfib and L femur chronic osteomyelitis was admitted with SOB. He was placed on BiPAP in MICU. Feels better.   He also has left femoral intramedullary abscess and OM for which completed 6 weeks of IV treatment and after that placed on suppressive therapy with ciprofloxacin.   No pain in leg, the opening is smaller with less discharge and left lateral side of thigh.  Has leukocytosis that could be reactional, currently on steroid but at home he takes only 2mg of prednisone.  Procalcitonin is <0.05 and CXR with no obvious opacities, could be just COPD exacerbation.     Feels a lot better now has been transferred out of ICU. No leukocytosis today.     1- Left femoral OM  - Grew pseudomonas and MSSA in the past  - S/p MRI showed collection, intraosseous abscess  - S/P IR drain placement on   - Will continue suppressive ciprofloxacin, clinically has responded  - No side effects or diarrhea    2- COPD exacerbation  - On azithromycin 250mg 3times weekly  - RVP negative   - Steroid and inhalers as per primary team     Will sign off please call with any question.   Discussed with the primary team.     Brandi العلي MD  Division of Infectious Diseases   Please call ID service at [phone] with any question.      35 minutes spent on total encounter assessing patient, examination, chart reivew, counseling and coordinating care by the attending physician/nurse/care manager.

## 2022-05-26 NOTE — DIETITIAN INITIAL EVALUATION ADULT - NS FNS DIET ORDER
Diet, Regular:   Consistent Carbohydrate {Evening Snack}  DASH/TLC {Sodium & Cholesterol Restricted} (05-26-22 @ 07:27)

## 2022-05-26 NOTE — PROGRESS NOTE ADULT - SUBJECTIVE AND OBJECTIVE BOX
Patient is a 82y old  Male who presents with a chief complaint of SOB and lethargy.      INTERVAL HPI/OVERNIGHT EVENTS: Pt's blood gas improved and transferred out of the ICU to telemetry today. Pt states he feels much improved. SOB improved and pt is off BiPAP and on RA now. Denies fever, chills, CP, cough.    MEDICATIONS  (STANDING):  albuterol/ipratropium for Nebulization 3 milliLiter(s) Nebulizer every 6 hours  apixaban 2.5 milliGRAM(s) Oral every 12 hours  atorvastatin 80 milliGRAM(s) Oral at bedtime  azithromycin   Tablet 250 milliGRAM(s) Oral <User Schedule>  budesonide 160 MICROgram(s)/formoterol 4.5 MICROgram(s) Inhaler 2 Puff(s) Inhalation two times a day  chlorhexidine 4% Liquid 1 Application(s) Topical <User Schedule>  ciprofloxacin     Tablet 500 milliGRAM(s) Oral every 12 hours  clopidogrel Tablet 75 milliGRAM(s) Oral daily  dextrose 5%. 1000 milliLiter(s) (50 mL/Hr) IV Continuous <Continuous>  dextrose 5%. 1000 milliLiter(s) (100 mL/Hr) IV Continuous <Continuous>  dextrose 50% Injectable 25 Gram(s) IV Push once  dextrose 50% Injectable 12.5 Gram(s) IV Push once  dextrose 50% Injectable 25 Gram(s) IV Push once  glucagon  Injectable 1 milliGRAM(s) IntraMuscular once  insulin lispro (ADMELOG) corrective regimen sliding scale   SubCutaneous three times a day before meals  insulin lispro (ADMELOG) corrective regimen sliding scale   SubCutaneous at bedtime  losartan 25 milliGRAM(s) Oral daily  methylPREDNISolone sodium succinate Injectable 40 milliGRAM(s) IV Push every 8 hours  metoprolol tartrate 25 milliGRAM(s) Oral every 12 hours  metoprolol tartrate 25 milliGRAM(s) Oral once  montelukast 10 milliGRAM(s) Oral daily  tamsulosin 0.4 milliGRAM(s) Oral at bedtime      Allergies    No Known Allergies    Intolerances    shellfish (Nausea)      REVIEW OF SYSTEMS:  CONSTITUTIONAL: lethargy improved; No fever or chills  HEENT:  No headache, no sore throat  RESPIRATORY: SOB much improved; No cough, wheezing   CARDIOVASCULAR: No chest pain, palpitations  GASTROINTESTINAL: No abd pain, nausea, vomiting, or diarrhea  GENITOURINARY: No dysuria, frequency, or hematuria  NEUROLOGICAL: no focal weakness or dizziness  MUSCULOSKELETAL: no myalgias     Vital Signs Last 24 Hrs  T(C): 36.6 (26 May 2022 11:39), Max: 36.7 (25 May 2022 16:35)  T(F): 97.8 (26 May 2022 11:39), Max: 98 (25 May 2022 16:35)  HR: 88 (26 May 2022 13:10) (79 - 118)  BP: 130/75 (26 May 2022 11:39) (118/65 - 165/78)  BP(mean): 90 (26 May 2022 11:00) (85 - 112)  RR: 19 (26 May 2022 11:39) (14 - 36)  SpO2: 100% (26 May 2022 13:10) (90% - 100%)    PHYSICAL EXAM:  GENERAL: NAD at rest  HEENT:  anicteric, moist mucous membranes  CHEST/LUNG:  decreased breath sounds b/l, scattered wheezes b/l  HEART:  RRR, S1, S2  ABDOMEN:  BS+, soft, nontender, nondistended  EXTREMITIES: no cyanosis or calf tenderness  NERVOUS SYSTEM: answers questions and follows commands appropriately    LABS:                        13.3   10.67 )-----------( 309      ( 26 May 2022 07:27 )             45.4     CBC Full  -  ( 26 May 2022 07:27 )  WBC Count : 10.67 K/uL  Hemoglobin : 13.3 g/dL  Hematocrit : 45.4 %  Platelet Count - Automated : 309 K/uL  Mean Cell Volume : 91.5 fl  Mean Cell Hemoglobin : 26.8 pg  Mean Cell Hemoglobin Concentration : 29.3 gm/dL  Auto Neutrophil # : 8.30 K/uL  Auto Lymphocyte # : 0.88 K/uL  Auto Monocyte # : 1.42 K/uL  Auto Eosinophil # : 0.00 K/uL  Auto Basophil # : 0.01 K/uL  Auto Neutrophil % : 77.8 %  Auto Lymphocyte % : 8.2 %  Auto Monocyte % : 13.3 %  Auto Eosinophil % : 0.0 %  Auto Basophil % : 0.1 %    26 May 2022 07:27    145    |  107    |  34     ----------------------------<  171    4.5     |  32     |  1.70     Ca    10.2       26 May 2022 07:27  Phos  4.3       26 May 2022 07:27  Mg     2.9       26 May 2022 07:27    TPro  7.3    /  Alb  3.8    /  TBili  0.3    /  DBili  x      /  AST  17     /  ALT  31     /  AlkPhos  88     26 May 2022 07:27    PT/INR - ( 26 May 2022 07:27 )   PT: 12.7 sec;   INR: 1.08 ratio             CAPILLARY BLOOD GLUCOSE      POCT Blood Glucose.: 268 mg/dL (26 May 2022 11:52)  POCT Blood Glucose.: 264 mg/dL (26 May 2022 09:25)  POCT Blood Glucose.: 179 mg/dL (26 May 2022 08:07)        Culture - Blood (collected 05-25-22 @ 12:28)  Source: .Blood Blood  Preliminary Report (05-26-22 @ 13:01):    No growth to date.    Culture - Blood (collected 05-25-22 @ 12:28)  Source: .Blood Blood  Preliminary Report (05-26-22 @ 13:01):    No growth to date.        RADIOLOGY & ADDITIONAL TESTS:    Personally reviewed.     Consultant(s) Notes Reviewed:  [x] YES  [ ] NO       Patient is a 82y old  Male who presents with a chief complaint of SOB and lethargy.       INTERVAL HPI/OVERNIGHT EVENTS: Pt's blood gas improved and transferred out of the ICU to telemetry today. Pt states he feels much improved. SOB improved and pt is off BiPAP and on RA now. Denies fever, chills, CP, cough.    MEDICATIONS  (STANDING):  albuterol/ipratropium for Nebulization 3 milliLiter(s) Nebulizer every 6 hours  apixaban 2.5 milliGRAM(s) Oral every 12 hours  atorvastatin 80 milliGRAM(s) Oral at bedtime  azithromycin   Tablet 250 milliGRAM(s) Oral <User Schedule>  budesonide 160 MICROgram(s)/formoterol 4.5 MICROgram(s) Inhaler 2 Puff(s) Inhalation two times a day  chlorhexidine 4% Liquid 1 Application(s) Topical <User Schedule>  ciprofloxacin     Tablet 500 milliGRAM(s) Oral every 12 hours  clopidogrel Tablet 75 milliGRAM(s) Oral daily  dextrose 5%. 1000 milliLiter(s) (50 mL/Hr) IV Continuous <Continuous>  dextrose 5%. 1000 milliLiter(s) (100 mL/Hr) IV Continuous <Continuous>  dextrose 50% Injectable 25 Gram(s) IV Push once  dextrose 50% Injectable 12.5 Gram(s) IV Push once  dextrose 50% Injectable 25 Gram(s) IV Push once  glucagon  Injectable 1 milliGRAM(s) IntraMuscular once  insulin lispro (ADMELOG) corrective regimen sliding scale   SubCutaneous three times a day before meals  insulin lispro (ADMELOG) corrective regimen sliding scale   SubCutaneous at bedtime  losartan 25 milliGRAM(s) Oral daily  methylPREDNISolone sodium succinate Injectable 40 milliGRAM(s) IV Push every 8 hours  metoprolol tartrate 25 milliGRAM(s) Oral every 12 hours  metoprolol tartrate 25 milliGRAM(s) Oral once  montelukast 10 milliGRAM(s) Oral daily  tamsulosin 0.4 milliGRAM(s) Oral at bedtime      Allergies    No Known Allergies    Intolerances    shellfish (Nausea)      REVIEW OF SYSTEMS:  CONSTITUTIONAL: lethargy improved; No fever or chills  HEENT:  No headache, no sore throat  RESPIRATORY: SOB much improved; No cough, wheezing   CARDIOVASCULAR: No chest pain, palpitations  GASTROINTESTINAL: No abd pain, nausea, vomiting, or diarrhea  GENITOURINARY: No dysuria, frequency, or hematuria  NEUROLOGICAL: no focal weakness or dizziness  MUSCULOSKELETAL: no myalgias     Vital Signs Last 24 Hrs  T(C): 36.6 (26 May 2022 11:39), Max: 36.7 (25 May 2022 16:35)  T(F): 97.8 (26 May 2022 11:39), Max: 98 (25 May 2022 16:35)  HR: 88 (26 May 2022 13:10) (79 - 118)  BP: 130/75 (26 May 2022 11:39) (118/65 - 165/78)  BP(mean): 90 (26 May 2022 11:00) (85 - 112)  RR: 19 (26 May 2022 11:39) (14 - 36)  SpO2: 100% (26 May 2022 13:10) (90% - 100%)    PHYSICAL EXAM:  GENERAL: NAD at rest  HEENT:  anicteric, moist mucous membranes  CHEST/LUNG:  decreased breath sounds b/l, scattered wheezes b/l  HEART:  RRR, S1, S2  ABDOMEN:  BS+, soft, nontender, nondistended  EXTREMITIES: no cyanosis or calf tenderness  NERVOUS SYSTEM: answers questions and follows commands appropriately    LABS:                        13.3   10.67 )-----------( 309      ( 26 May 2022 07:27 )             45.4     CBC Full  -  ( 26 May 2022 07:27 )  WBC Count : 10.67 K/uL  Hemoglobin : 13.3 g/dL  Hematocrit : 45.4 %  Platelet Count - Automated : 309 K/uL  Mean Cell Volume : 91.5 fl  Mean Cell Hemoglobin : 26.8 pg  Mean Cell Hemoglobin Concentration : 29.3 gm/dL  Auto Neutrophil # : 8.30 K/uL  Auto Lymphocyte # : 0.88 K/uL  Auto Monocyte # : 1.42 K/uL  Auto Eosinophil # : 0.00 K/uL  Auto Basophil # : 0.01 K/uL  Auto Neutrophil % : 77.8 %  Auto Lymphocyte % : 8.2 %  Auto Monocyte % : 13.3 %  Auto Eosinophil % : 0.0 %  Auto Basophil % : 0.1 %    26 May 2022 07:27    145    |  107    |  34     ----------------------------<  171    4.5     |  32     |  1.70     Ca    10.2       26 May 2022 07:27  Phos  4.3       26 May 2022 07:27  Mg     2.9       26 May 2022 07:27    TPro  7.3    /  Alb  3.8    /  TBili  0.3    /  DBili  x      /  AST  17     /  ALT  31     /  AlkPhos  88     26 May 2022 07:27    PT/INR - ( 26 May 2022 07:27 )   PT: 12.7 sec;   INR: 1.08 ratio             CAPILLARY BLOOD GLUCOSE      POCT Blood Glucose.: 268 mg/dL (26 May 2022 11:52)  POCT Blood Glucose.: 264 mg/dL (26 May 2022 09:25)  POCT Blood Glucose.: 179 mg/dL (26 May 2022 08:07)        Culture - Blood (collected 05-25-22 @ 12:28)  Source: .Blood Blood  Preliminary Report (05-26-22 @ 13:01):    No growth to date.    Culture - Blood (collected 05-25-22 @ 12:28)  Source: .Blood Blood  Preliminary Report (05-26-22 @ 13:01):    No growth to date.        RADIOLOGY & ADDITIONAL TESTS:    Personally reviewed.     Consultant(s) Notes Reviewed:  [x] YES  [ ] NO

## 2022-05-26 NOTE — DIETITIAN INITIAL EVALUATION ADULT - OTHER INFO
GI/Intake:  -Per pt and RN, good PO intake of meals last night and this AM   -Last BM documented 5/24; no bowel regimen ordered     Endo:   -Hx of T2DM  -Order A1c   -Endorses taking Jardiance and Metformin daily; states Wife checks his finger sticks daily; unable to recall average numbers at this time   -States BG has been elevated recently in setting of Prednisone   -Lantus and SSI ordered in-house     Resp/Pulm:   -Presenting with acute on chronic respiratory failure 2/2 COPD exacerbation   -Noted on home O2   -BiPAP to be weaned as tolerated; not noted with device this AM     Renal:   -MERRILL on CKD   -Hypermagnesemic; trend lytes

## 2022-05-26 NOTE — DIETITIAN INITIAL EVALUATION ADULT - PERTINENT MEDS FT
MEDICATIONS  (STANDING):  albuterol/ipratropium for Nebulization 3 milliLiter(s) Nebulizer every 6 hours  apixaban 2.5 milliGRAM(s) Oral every 12 hours  atorvastatin 80 milliGRAM(s) Oral at bedtime  azithromycin   Tablet 250 milliGRAM(s) Oral <User Schedule>  budesonide 160 MICROgram(s)/formoterol 4.5 MICROgram(s) Inhaler 2 Puff(s) Inhalation two times a day  chlorhexidine 2% Cloths 1 Application(s) Topical daily  ciprofloxacin     Tablet 500 milliGRAM(s) Oral every 12 hours  clopidogrel Tablet 75 milliGRAM(s) Oral daily  dextrose 5%. 1000 milliLiter(s) (50 mL/Hr) IV Continuous <Continuous>  dextrose 5%. 1000 milliLiter(s) (100 mL/Hr) IV Continuous <Continuous>  dextrose 50% Injectable 25 Gram(s) IV Push once  dextrose 50% Injectable 12.5 Gram(s) IV Push once  dextrose 50% Injectable 25 Gram(s) IV Push once  glucagon  Injectable 1 milliGRAM(s) IntraMuscular once  insulin glargine Injectable (LANTUS) 8 Unit(s) SubCutaneous every morning  insulin lispro (ADMELOG) corrective regimen sliding scale   SubCutaneous three times a day before meals  insulin lispro (ADMELOG) corrective regimen sliding scale   SubCutaneous at bedtime  metoprolol tartrate 25 milliGRAM(s) Oral every 12 hours  montelukast 10 milliGRAM(s) Oral daily  tamsulosin 0.4 milliGRAM(s) Oral at bedtime    MEDICATIONS  (PRN):  ALBUTerol    0.083% 2.5 milliGRAM(s) Nebulizer every 6 hours PRN Shortness of Breath and/or Wheezing  dextrose Oral Gel 15 Gram(s) Oral once PRN Blood Glucose LESS THAN 70 milliGRAM(s)/deciliter

## 2022-05-26 NOTE — DIETITIAN INITIAL EVALUATION ADULT - PERTINENT LABORATORY DATA
05-26    145  |  107  |  34<H>  ----------------------------<  171<H>  4.5   |  32<H>  |  1.70<H>    Ca    10.2<H>      26 May 2022 07:27  Phos  4.3     05-26  Mg     2.9     05-26    TPro  7.3  /  Alb  3.8  /  TBili  0.3  /  DBili  x   /  AST  17  /  ALT  31  /  AlkPhos  88  05-26  POCT Blood Glucose.: 264 mg/dL (05-26-22 @ 09:25)  A1C with Estimated Average Glucose Result: 7.6 % (12-18-21 @ 10:58)  A1C with Estimated Average Glucose Result: 7.5 % (09-05-21 @ 10:16)  A1C with Estimated Average Glucose Result: 7.7 % (08-03-21 @ 05:04)

## 2022-05-26 NOTE — PROGRESS NOTE ADULT - SUBJECTIVE AND OBJECTIVE BOX
Patient is a 82y old  Male who presents with a chief complaint of 83yo Male with PMH of HTN, COPD, Dyslipidemia, CAD, T2DM. Pt admitted on 5/25 with acute respiratory failure with hypoxia in the setting of COPD exacerbation.      (26 May 2022 11:12)    24 hour events: Pt seen and examined at bedside. No acute event overnight. saturating well on RA. Denies sob, chest pain, dizziness, palpitations, wheezing, abd pain, n/v/d/c.     REVIEW OF SYSTEMS  Constitutional: No fever, chills, fatigue  Neuro: No headache, numbness, weakness  Resp: [X] cough; No wheezing, shortness of breath  CVS: No chest pain, palpitations, leg swelling  GI: No abdominal pain, nausea, vomiting, diarrhea   : No dysuria, frequency, incontinence  Skin: No itching, burning, rashes, or lesions   Msk: No joint pain or swelling  Psych: No depression, anxiety, mood swings  Heme: No bleeding    T(F): 97.8 (05-26-22 @ 11:39), Max: 98 (05-25-22 @ 16:35)  HR: 96 (05-26-22 @ 11:39) (79 - 125)  BP: 130/75 (05-26-22 @ 11:39) (118/65 - 185/85)  RR: 19 (05-26-22 @ 11:39) (14 - 36)  SpO2: 95% (05-26-22 @ 11:39) (90% - 100%)  Wt(kg): --            I&O's Summary    05-25 @ 07:01  -  05-26 @ 07:00  --------------------------------------------------------  IN: 100 mL / OUT: 1480 mL / NET: -1380 mL    05-26 @ 07:01  -  05-26 @ 12:33  --------------------------------------------------------  IN: 240 mL / OUT: 420 mL / NET: -180 mL      PHYSICAL EXAM  General: well appearing   CNS: aox3; follows simple commands appropriately   HEENT: ncat, perrla, eomi   Resp: decrease breath sound at b/l lung bases; + mild wheezes   CVS: rrr; s1 s2 heard   Abd: nt, nd, +bs   Ext: no c/c/e   Skin: warm and dry       MEDICATIONS  azithromycin   Tablet Oral  ciprofloxacin     Tablet Oral    metoprolol tartrate Oral  tamsulosin Oral    atorvastatin Oral  dextrose 50% Injectable IV Push  dextrose 50% Injectable IV Push  dextrose 50% Injectable IV Push  dextrose Oral Gel Oral PRN  glucagon  Injectable IntraMuscular  insulin glargine Injectable (LANTUS) SubCutaneous  insulin lispro (ADMELOG) corrective regimen sliding scale SubCutaneous  insulin lispro (ADMELOG) corrective regimen sliding scale SubCutaneous    ALBUTerol    0.083% Nebulizer PRN  albuterol/ipratropium for Nebulization Nebulizer  budesonide 160 MICROgram(s)/formoterol 4.5 MICROgram(s) Inhaler Inhalation  montelukast Oral        apixaban Oral  clopidogrel Tablet Oral        dextrose 5%. IV Continuous  dextrose 5%. IV Continuous      chlorhexidine 2% Cloths Topical                            13.3   10.67 )-----------( 309      ( 26 May 2022 07:27 )             45.4       05-26    145  |  107  |  34<H>  ----------------------------<  171<H>  4.5   |  32<H>  |  1.70<H>    Ca    10.2<H>      26 May 2022 07:27  Phos  4.3     05-26  Mg     2.9     05-26    TPro  7.3  /  Alb  3.8  /  TBili  0.3  /  DBili  x   /  AST  17  /  ALT  31  /  AlkPhos  88  05-26          PT/INR - ( 26 May 2022 07:27 )   PT: 12.7 sec;   INR: 1.08 ratio                 Rapid RVP Result: NotDetec (05-25 @ 07:48)    Radiology: ***  Bedside lung ultrasound: ***  Bedside ECHO: ***    CENTRAL LINE: Y/N          DATE INSERTED:              REMOVE: Y/N  SHARMA: Y/N                        DATE INSERTED:              REMOVE: Y/N  A-LINE: Y/N                       DATE INSERTED:              REMOVE: Y/N    GLOBAL ISSUE/BEST PRACTICE  Analgesia: N  Sedation: N  CAM-ICU: N  HOB elevation: yes  Stress ulcer prophylaxis: Y  VTE prophylaxis: Y  Glycemic control: Y  Nutrition: Y    CODE STATUS: Full code   GO discussion: Y

## 2022-05-27 LAB
ALBUMIN SERPL ELPH-MCNC: 3.7 G/DL — SIGNIFICANT CHANGE UP (ref 3.3–5)
ALP SERPL-CCNC: 94 U/L — SIGNIFICANT CHANGE UP (ref 40–120)
ALT FLD-CCNC: 31 U/L — SIGNIFICANT CHANGE UP (ref 12–78)
ANION GAP SERPL CALC-SCNC: 11 MMOL/L — SIGNIFICANT CHANGE UP (ref 5–17)
AST SERPL-CCNC: 19 U/L — SIGNIFICANT CHANGE UP (ref 15–37)
BASOPHILS # BLD AUTO: 0.02 K/UL — SIGNIFICANT CHANGE UP (ref 0–0.2)
BASOPHILS NFR BLD AUTO: 0.1 % — SIGNIFICANT CHANGE UP (ref 0–2)
BILIRUB SERPL-MCNC: 0.4 MG/DL — SIGNIFICANT CHANGE UP (ref 0.2–1.2)
BUN SERPL-MCNC: 37 MG/DL — HIGH (ref 7–23)
CALCIUM SERPL-MCNC: 9.8 MG/DL — SIGNIFICANT CHANGE UP (ref 8.5–10.1)
CHLORIDE SERPL-SCNC: 104 MMOL/L — SIGNIFICANT CHANGE UP (ref 96–108)
CO2 SERPL-SCNC: 26 MMOL/L — SIGNIFICANT CHANGE UP (ref 22–31)
CREAT SERPL-MCNC: 1.6 MG/DL — HIGH (ref 0.5–1.3)
EGFR: 42 ML/MIN/1.73M2 — LOW
EOSINOPHIL # BLD AUTO: 0.03 K/UL — SIGNIFICANT CHANGE UP (ref 0–0.5)
EOSINOPHIL NFR BLD AUTO: 0.2 % — SIGNIFICANT CHANGE UP (ref 0–6)
GLUCOSE SERPL-MCNC: 164 MG/DL — HIGH (ref 70–99)
HCT VFR BLD CALC: 46.5 % — SIGNIFICANT CHANGE UP (ref 39–50)
HGB BLD-MCNC: 14 G/DL — SIGNIFICANT CHANGE UP (ref 13–17)
IMM GRANULOCYTES NFR BLD AUTO: 0.4 % — SIGNIFICANT CHANGE UP (ref 0–1.5)
LYMPHOCYTES # BLD AUTO: 1.96 K/UL — SIGNIFICANT CHANGE UP (ref 1–3.3)
LYMPHOCYTES # BLD AUTO: 14.5 % — SIGNIFICANT CHANGE UP (ref 13–44)
MAGNESIUM SERPL-MCNC: 2.2 MG/DL — SIGNIFICANT CHANGE UP (ref 1.6–2.6)
MCHC RBC-ENTMCNC: 26.8 PG — LOW (ref 27–34)
MCHC RBC-ENTMCNC: 30.1 GM/DL — LOW (ref 32–36)
MCV RBC AUTO: 88.9 FL — SIGNIFICANT CHANGE UP (ref 80–100)
MONOCYTES # BLD AUTO: 1.42 K/UL — HIGH (ref 0–0.9)
MONOCYTES NFR BLD AUTO: 10.5 % — SIGNIFICANT CHANGE UP (ref 2–14)
NEUTROPHILS # BLD AUTO: 10.06 K/UL — HIGH (ref 1.8–7.4)
NEUTROPHILS NFR BLD AUTO: 74.3 % — SIGNIFICANT CHANGE UP (ref 43–77)
NRBC # BLD: 0 /100 WBCS — SIGNIFICANT CHANGE UP (ref 0–0)
NT-PROBNP SERPL-SCNC: 242 PG/ML — SIGNIFICANT CHANGE UP (ref 0–450)
PHOSPHATE SERPL-MCNC: 2.8 MG/DL — SIGNIFICANT CHANGE UP (ref 2.5–4.5)
PLATELET # BLD AUTO: 304 K/UL — SIGNIFICANT CHANGE UP (ref 150–400)
POTASSIUM SERPL-MCNC: 4.3 MMOL/L — SIGNIFICANT CHANGE UP (ref 3.5–5.3)
POTASSIUM SERPL-SCNC: 4.3 MMOL/L — SIGNIFICANT CHANGE UP (ref 3.5–5.3)
PROT SERPL-MCNC: 7.5 G/DL — SIGNIFICANT CHANGE UP (ref 6–8.3)
RBC # BLD: 5.23 M/UL — SIGNIFICANT CHANGE UP (ref 4.2–5.8)
RBC # FLD: 14.4 % — SIGNIFICANT CHANGE UP (ref 10.3–14.5)
SODIUM SERPL-SCNC: 141 MMOL/L — SIGNIFICANT CHANGE UP (ref 135–145)
TROPONIN I, HIGH SENSITIVITY RESULT: 14.2 NG/L — SIGNIFICANT CHANGE UP
WBC # BLD: 13.55 K/UL — HIGH (ref 3.8–10.5)
WBC # FLD AUTO: 13.55 K/UL — HIGH (ref 3.8–10.5)

## 2022-05-27 PROCEDURE — 99232 SBSQ HOSP IP/OBS MODERATE 35: CPT

## 2022-05-27 PROCEDURE — 93010 ELECTROCARDIOGRAM REPORT: CPT

## 2022-05-27 PROCEDURE — 99223 1ST HOSP IP/OBS HIGH 75: CPT

## 2022-05-27 PROCEDURE — 99233 SBSQ HOSP IP/OBS HIGH 50: CPT

## 2022-05-27 RX ORDER — METOPROLOL TARTRATE 50 MG
50 TABLET ORAL EVERY 12 HOURS
Refills: 0 | Status: DISCONTINUED | OUTPATIENT
Start: 2022-05-27 | End: 2022-05-28

## 2022-05-27 RX ORDER — METOPROLOL TARTRATE 50 MG
5 TABLET ORAL ONCE
Refills: 0 | Status: COMPLETED | OUTPATIENT
Start: 2022-05-27 | End: 2022-05-27

## 2022-05-27 RX ADMIN — CLOPIDOGREL BISULFATE 75 MILLIGRAM(S): 75 TABLET, FILM COATED ORAL at 12:13

## 2022-05-27 RX ADMIN — Medication 250 MILLIGRAM(S): at 05:48

## 2022-05-27 RX ADMIN — Medication 6: at 12:26

## 2022-05-27 RX ADMIN — Medication 3 MILLILITER(S): at 02:48

## 2022-05-27 RX ADMIN — APIXABAN 2.5 MILLIGRAM(S): 2.5 TABLET, FILM COATED ORAL at 05:48

## 2022-05-27 RX ADMIN — Medication 3 MILLILITER(S): at 08:03

## 2022-05-27 RX ADMIN — Medication 2: at 07:58

## 2022-05-27 RX ADMIN — MONTELUKAST 10 MILLIGRAM(S): 4 TABLET, CHEWABLE ORAL at 12:12

## 2022-05-27 RX ADMIN — BUDESONIDE AND FORMOTEROL FUMARATE DIHYDRATE 2 PUFF(S): 160; 4.5 AEROSOL RESPIRATORY (INHALATION) at 17:53

## 2022-05-27 RX ADMIN — Medication 40 MILLIGRAM(S): at 06:27

## 2022-05-27 RX ADMIN — Medication 500 MILLIGRAM(S): at 05:48

## 2022-05-27 RX ADMIN — AZITHROMYCIN 250 MILLIGRAM(S): 500 TABLET, FILM COATED ORAL at 17:52

## 2022-05-27 RX ADMIN — Medication 6: at 17:50

## 2022-05-27 RX ADMIN — Medication 50 MILLIGRAM(S): at 17:51

## 2022-05-27 RX ADMIN — Medication 500 MILLIGRAM(S): at 17:52

## 2022-05-27 RX ADMIN — ATORVASTATIN CALCIUM 80 MILLIGRAM(S): 80 TABLET, FILM COATED ORAL at 22:16

## 2022-05-27 RX ADMIN — Medication 25 MILLIGRAM(S): at 06:27

## 2022-05-27 RX ADMIN — INSULIN GLARGINE 8 UNIT(S): 100 INJECTION, SOLUTION SUBCUTANEOUS at 07:58

## 2022-05-27 RX ADMIN — APIXABAN 2.5 MILLIGRAM(S): 2.5 TABLET, FILM COATED ORAL at 19:00

## 2022-05-27 RX ADMIN — Medication 5 MILLIGRAM(S): at 06:16

## 2022-05-27 RX ADMIN — TAMSULOSIN HYDROCHLORIDE 0.4 MILLIGRAM(S): 0.4 CAPSULE ORAL at 22:16

## 2022-05-27 RX ADMIN — Medication 250 MILLIGRAM(S): at 17:51

## 2022-05-27 RX ADMIN — PANTOPRAZOLE SODIUM 40 MILLIGRAM(S): 20 TABLET, DELAYED RELEASE ORAL at 05:48

## 2022-05-27 RX ADMIN — BUDESONIDE AND FORMOTEROL FUMARATE DIHYDRATE 2 PUFF(S): 160; 4.5 AEROSOL RESPIRATORY (INHALATION) at 05:47

## 2022-05-27 NOTE — PROGRESS NOTE ADULT - SUBJECTIVE AND OBJECTIVE BOX
[facility]  INFECTIOUS DISEASES   [address] [place] [address]  Tel: [phone]     Fax: [phone]  ======================================================  MD Noman Perry Kaushal, MD Cho, Michelle, MD   ======================================================    [facility]-[id number]  [facility]     Follow up: COPD exacerbation, Left thigh infection     Doing a lot better, not on O2 this afternoon.    No fever. Had SVT.     PAST MEDICAL & SURGICAL HISTORY:  Diabetes Mellitus, Type II  CAD (Coronary Artery Disease)  s/p 3v CABG ; stents placed in [place] in   Dyslipidemia  Osteomyelitis  COPD (chronic obstructive pulmonary disease)  on 2L at home and BiPAP at night; intubated   Hypertension  PVD (peripheral vascular disease)  History of PAT (paroxysmal atrial tachycardia)  CABG (Coronary Artery Bypass Graft)    Compound fracture  left leg  S/P primary angioplasty with coronary stent    Social Hx: no current smoking, ETOH or drugs     FAMILY HISTORY:  Family history of diabetes mellitus (Sibling)    Family hx of lung cancer  brother,  age 82, used to smoke with pt    Allergies  No Known Allergies    Antibiotics:  MEDICATIONS  (STANDING):  albuterol/ipratropium for Nebulization 3 milliLiter(s) Nebulizer every 6 hours  apixaban 2.5 milliGRAM(s) Oral every 12 hours  atorvastatin 80 milliGRAM(s) Oral at bedtime  azithromycin   Tablet 250 milliGRAM(s) Oral <User Schedule>  budesonide 160 MICROgram(s)/formoterol 4.5 MICROgram(s) Inhaler 2 Puff(s) Inhalation two times a day  chlorhexidine 4% Liquid 1 Application(s) Topical <User Schedule>  ciprofloxacin     Tablet 500 milliGRAM(s) Oral every 12 hours  clopidogrel Tablet 75 milliGRAM(s) Oral daily  dextrose 5%. 1000 milliLiter(s) (50 mL/Hr) IV Continuous <Continuous>  dextrose 5%. 1000 milliLiter(s) (100 mL/Hr) IV Continuous <Continuous>  dextrose 50% Injectable 25 Gram(s) IV Push once  dextrose 50% Injectable 12.5 Gram(s) IV Push once  dextrose 50% Injectable 25 Gram(s) IV Push once  glucagon  Injectable 1 milliGRAM(s) IntraMuscular once  insulin lispro (ADMELOG) corrective regimen sliding scale   SubCutaneous three times a day before meals  insulin lispro (ADMELOG) corrective regimen sliding scale   SubCutaneous at bedtime  losartan 25 milliGRAM(s) Oral daily  methylPREDNISolone sodium succinate Injectable 40 milliGRAM(s) IV Push every 8 hours  metoprolol tartrate 25 milliGRAM(s) Oral every 12 hours  metoprolol tartrate 25 milliGRAM(s) Oral once  montelukast 10 milliGRAM(s) Oral daily  tamsulosin 0.4 milliGRAM(s) Oral at bedtime    REVIEW OF SYSTEMS:  CONSTITUTIONAL:  No Fever or chills  HEENT:  No diplopia or blurred vision.  No sore throat or runny nose.  CARDIOVASCULAR:  No chest pain or SOB.  RESPIRATORY:  No cough, +shortness of breath,  GASTROINTESTINAL:  No nausea, vomiting or diarrhea.  GENITOURINARY:  No dysuria, frequency or urgency. No Blood in urine  MUSCULOSKELETAL:  no joint aches, no muscle pain  SKIN:  No change in skin, hair or nails.  NEUROLOGIC:  No paresthesias, fasciculations, seizures or weakness.  PSYCHIATRIC:  No disorder of thought or mood.  ENDOCRINE:  No heat or cold intolerance, polyuria or polydipsia.  HEMATOLOGICAL:  No easy bruising or bleeding.     Physical Exam:  Vital Signs Last 24 Hrs  T(C): 36.7 (27 May 2022 10:43), Max: 36.7 (26 May 2022 20:11)  T(F): 98 (27 May 2022 10:43), Max: 98.1 (26 May 2022 20:11)  HR: 97 (27 May 2022 10:43) (84 - 180)  BP: 132/73 (27 May 2022 10:43) (129/73 - 145/74)  BP(mean): --  RR: 18 (27 May 2022 10:43) (17 - 18)  SpO2: 95% (27 May 2022 10:43) (93% - 99%)  HEENT: normocephalic and atraumatic. EOMI. PERRL.    NECK: Supple.  No lymphadenopathy   LUNGS: Decreased breath sounds bilaterally with scattered rhonchi both sides  HEART: Regular rate and rhythm without murmur.  ABDOMEN: Soft, nontender, and nondistended.  Positive bowel sounds.    : No CVA tenderness  EXTREMITIES: left lateral thigh with a healed scar, opening in the middle of scar with some serosanguinous fluid.   NEUROLOGIC: grossly intact.  PSYCHIATRIC: Appropriate affect .  SKIN: No ulceration or induration present.    Labs:                        14.0   13.55 )-----------( 304      ( 27 May 2022 07:41 )             46.5         141  |  104  |  37<H>  ----------------------------<  164<H>  4.3   |  26  |  1.60<H>    Ca    9.8      27 May 2022 07:41  Phos  2.8       Mg     2.2         TPro  7.5  /  Alb  3.7  /  TBili  0.4  /  DBili  x   /  AST  19  /  ALT  31  /  AlkPhos  94      Culture - Blood (collected 22 @ 12:28)  Source: .Blood Blood    Culture - Blood (collected 22 @ 12:28)  Source: .Blood Blood    WBC Count: 13.55 K/uL (22 @ 07:41)  WBC Count: 10.67 K/uL (22 @ 07:27)  WBC Count: 17.28 K/uL (22 @ 07:50)    Creatinine, Serum: 1.60 mg/dL (22 @ 07:41)  Creatinine, Serum: 1.70 mg/dL (22 @ 07:27)  Creatinine, Serum: 1.70 mg/dL (22 @ 07:50)    Procalcitonin, Serum: <0.05 (22 @ 07:50)     SARS-CoV-2: NotDetec (22 @ 07:48)    All imaging and other data have been reviewed.  < from: Xray Chest 1 View-PORTABLE IMMEDIATE (22 @ 07:51) >  IMPRESSION: Arterial deficiency noted within the upper lung fields and   flattening of the diaphragm suggest COPD/emphysema. There is a suggestion   for nodular opacities noted overlying the upper lung fields on this   limited portable radiograph, indeterminate. Follow-up PA and lateral   radiography of the chest recommended.    Assessment and Plan:   81 y/o man with a PMHx of HTN, Dm2, COPD (on home O2 prn and nocturnal BIPAP), CAD, pAfib and L femur chronic osteomyelitis was admitted with SOB. He was placed on BiPAP in MICU. Feels better.   He also has left femoral intramedullary abscess and OM for which completed 6 weeks of IV treatment and after that placed on suppressive therapy with ciprofloxacin.   No pain in leg, the opening is smaller with less discharge and left lateral side of thigh.  Has leukocytosis that could be reactional, currently on steroid but at home he takes only 2mg of prednisone.  Procalcitonin is <0.05 and CXR with no obvious opacities, could be just COPD exacerbation.     Feels a lot better now off o2, No fever. Had SVT this morning so will be monitored.     1- Left femoral OM  - Grew pseudomonas and MSSA in the past  - S/p MRI showed collection, intraosseous abscess  - S/P IR drain placement on   - Will continue suppressive ciprofloxacin, clinically has responded  - No side effects or diarrhea    2- COPD exacerbation  - On azithromycin 250mg 3times weekly as per pulmonary  - RVP negative   - Steroid and inhalers as per primary team     Will sign off please call with any question.   Discussed with the primary team.     Brandi العلي MD  Division of Infectious Diseases   Please call ID service at [phone] with any question.      35 minutes spent on total encounter assessing patient, examination, chart reivew, counseling and coordinating care by the attending physician/nurse/care manager.

## 2022-05-27 NOTE — PROGRESS NOTE ADULT - PROBLEM SELECTOR PLAN 10
Pt on metformin 1000 mg BID and Jardiance 25 mg daily, hold while inpatient  - Start sliding-scale insulin while inpatient, hypoglycemia protocol  - POCT blood glucose monitoring, goal 100-180   - will consider adding standing pre-meal insulin while on steroids if FSG persist elevated
Pt on metformin 1000 mg BID and Jardiance 25 mg daily, hold while inpatient  - Start sliding-scale insulin while inpatient, hypoglycemia protocol  - POCT blood glucose monitoring, goal 100-180   - will consider adding standing pre-meal insulin while on steroids if FSG persist elevated

## 2022-05-27 NOTE — PHYSICAL THERAPY INITIAL EVALUATION ADULT - RANGE OF MOTION EXAMINATION, REHAB EVAL
[FreeTextEntry1] : Patient is an 86 year-old woman with known past medical history of hypertension, dyslipidemia, noninsulin dependent type II diabetes, coronary artery disease status post multiple PCI, and persistent atrial fibrillation on anticoagulation (Pradaxa), who presents today with worsening lower extremity edema and dyspnea on exertion for the past 6 months. \par She started to notice these symptoms during the pandemic when she was more isolated and less active. \par Now, wound nurse is helping with ace warps of the legs. \par \par Previously taken care of by Joe Chan MD.\par Known CAD with multiple PCI in 2006, multiple caths since, but no further PCI.\par Atrial fibrillation with DCCV x1 years ago.\par \par Uses a cane to ambulate in the home, but uses a rolling walker outside the home. Had to pause with walker three times while walking from parking lot to our office.\par \par Sleeps on her stomach, but has frequent nocturia.\par \par  no ROM deficits were identified

## 2022-05-27 NOTE — PROGRESS NOTE ADULT - PROBLEM SELECTOR PLAN 8
- Lopressor increased to 50mg po q12h with hold parameters  - holding Losartan for now given possible MERRILL  - Monitor routine hemodynamics
Continue Lopressor 25 mg BID with hold parameters  - Pt on Eliquis 5 mg BID at home, continue as 2.5 mg BID while inpatient as pt has MERRILL

## 2022-05-27 NOTE — PROGRESS NOTE ADULT - PROBLEM SELECTOR PLAN 1
Pt presenting with difficulty breathing, hypoxia at home, and hypercapnia seen on ABG, consistent with COPD exacerbation  - ABG on admission 7.23/65/95/27 --> 7.24/63/66/27  - weaned off BiPAP in the ICU as ABG improved to 7.41/48/82/97% this morning and transferred out of the ICU today to telemetry  - Given Duonebs x 2, Solumedrol 125 mg in ED  - Continue nebs and steroids; will transition from solumedrol to prednisone  - discussed with Dr. Marcelino who is pt's outpt pulmonologist, as well as, his intensivist today and he rec prednisone taper  - Monitor ABGs  - monitor on RA now, BiPAP QHS as pt does at home  - no evidence of acute PNA, ID (Severiano), recs appreciated  - monitor on pt's chronic Abx of cipro and azithromycin  - pt's lethargy on admission that his wife reports has now resolved, was likely due to acute metabolic encephalopathy from the acidosis with pH of 7.2 due to pt's COPD exacerbation
Pt presenting with difficulty breathing, hypoxia at home, and hypercapnia seen on ABG, consistent with COPD exacerbation  - ABG on admission 7.23/65/95/27 --> 7.24/63/66/27  - weaned off BiPAP in the ICU as ABG improved to 7.41/48/82/97% this morning and transferred out of the ICU today to telemetry  - Given Duonebs x 2, Solumedrol 125 mg in ED  - Continue nebs and steroids; will transition from solumedrol to prednisone  - discussed with Dr. Marcelino who is pt's outpt pulmonologist, as well as, his intensivist today and he rec prednisone taper  - Monitor ABGs  - monitor on RA now, BiPAP QHS as pt does at home  - no evidence of acute PNA, ID (Severiano), recs appreciated  - monitor on pt's chronic Abx of cipro and azithromycin  - pt's lethargy on admission that his wife reports has now resolved, was likely due to acute metabolic encephalopathy from the acidosis with pH of 7.2 due to pt's COPD exacerbation

## 2022-05-27 NOTE — PROGRESS NOTE ADULT - PROBLEM SELECTOR PLAN 12
DVT ppx: Eliquis 2.5 mg po BID (Decreased dose due to MERRILL and age > 80)
DVT ppx: Eliquis 2.5 mg po BID (Decreased dose due to Cr>1.5 and age > 80)

## 2022-05-27 NOTE — PROGRESS NOTE ADULT - PROBLEM SELECTOR PLAN 2
Plan as above for respiratory failure  - Pt on Breo Ellipta and Incruse Ellipta at home, continue as Symbicort while inpatient  - Pt on albuterol inhaler prn at home, continue nebs  - Continue prophylactic azithromycin 250 mg MW  - Continue home montelukast 10 mg daily for hx of eosinophilic asthma-COPD overlap syndrome  - pt on Nucala injection for eosinophilia (next injection scheduled for 6/3/2022)  - Hold home prednisone while pt is on IV solumedrol
Plan as above for respiratory failure  - Pt on Breo Ellipta and Incruse Ellipta at home, continue as Symbicort while inpatient  - Pt on albuterol inhaler prn at home, continue nebs  - Continue prophylactic azithromycin 250 mg MW  - Continue home montelukast 10 mg daily for hx of eosinophilic asthma-COPD overlap syndrome  - pt on Nucala injection for eosinophilia (next injection scheduled for 6/3/2022)  - Hold home prednisone while pt is on IV solumedrol

## 2022-05-27 NOTE — CONSULT NOTE ADULT - ASSESSMENT
- Patient is not complaining of any cardiac symptoms at this time.  - No clear evidence of acute ischemia, trops negative x 2. Will follow up third set.  - His CKs are flat, suggesting against acute atherosclerotic plaque rupture.  - Biomarker trend is not consistent with plaque rupture but rather demand ischemia. Monitor closely for the development of anginal symptoms or clinical signs of ischemia.   - No acute changes on EKG compared to previous.  - No meaningful evidence of volume overload.  - Previous TTE shows ___.  - BP well controlled, monitor routine hemodynamics.  - Continue ___.  - Monitor and replete lytes, keep K>4, Mg>2.  - Strict I/Os, daily weights.  - Pt has no active ischemia, decompensated heart failure, unstable arrythmia, or severe stenotic valvular disease, and has __ cardiac risk factors. In the setting of low risk ___, he/she is optimized from cardiovascular standpoint to proceed with planned procedure with routine hemodynamic monitoring.   - Pt has no modifiable active cardiac risk factors and in the setting of low risk _____, patient is optimized as best as possible from cardiovascular standpoint to proceed with planned procedure with routine hemodynamic monitoring.  - Other cardiovascular workup will depend on clinical course.  - All other workup per primary team.  - Will continue to follow.             *********CHARTING IN PROGRESS***********    This is an 83 y/o M with a PMHx of COPD (on home O2 prn and nocturnal BIPAP), CAD (s/p 3v CABG 2004, stents in 2019), HLD, T2DM, HTN, PAT (on Eliquis), PVD (s/p stenting in b/l legs) cardiology consulted for overnight SVT.    - Patient is not complaining of any cardiac symptoms at this time.  - Trop 5/25 was 11.2. Follow-up trop this AM, trend x2.  - EKG this am shows possible subendocardial injury, repeat EKG this afternoon.    - Patient w/ known history of PAT on Eliquis.  - s/p lopressor 5mg ivp. Continue to control rates w/ Lopressor as needed   - Patient is on metoprolol tartrate 25mg po bid at this time, continue.    - No meaningful evidence of volume overload.  - Previous TTE 12/21 shows Overall normal systolic function with an estimated LVEF of 55-60%.    - BP well controlled, monitor routine hemodynamics. Can continue to hold home losartan for now in the setting of MERRILL.    - Continue home eliquis, plavix, statin. Patient is not on ASA in outpatient setting.  - Monitor and replete lytes, keep K>4, Mg>2.  - Other cardiovascular workup will depend on clinical course.  - All other workup per primary team.  - Will continue to follow.             This is an 81 y/o M with a PMHx of COPD (on home O2 prn and nocturnal BIPAP), CAD (s/p 3v CABG 2004, stents in 2019), HLD, T2DM, HTN, PAT (on Eliquis), PVD (s/p stenting in b/l legs) cardiology consulted for overnight SVT.    - Patient is not complaining of any cardiac symptoms at this time and was asymptomatic throughout the event.  - Trop 5/25 was 11.2, trop this am was 14.2, no need to trend.   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    - Patient w/ known history of PAT on Eliquis.  - s/p lopressor 5mg ivp  - Patient is on metoprolol tartrate 25mg po bid at this time, would increase to 50mg po bid for now  - Pt currently hemodynamically stable w/ HRs in mid 90's.    - No meaningful evidence of volume overload.  - Previous TTE 12/21 shows Overall normal systolic function with an estimated LVEF of 55-60%. No need to repeat at this time.    - BP well controlled, monitor routine hemodynamics. Can continue to hold home losartan for now in the setting of MERRILL.    - Continue home eliquis, plavix, statin. Patient is not on ASA in outpatient setting.    - COPD management per pulmonology.    - continue telemetry monitoring.  - Monitor and replete lytes, keep K>4, Mg>2.  - Other cardiovascular workup will depend on clinical course.  - All other workup per primary team.  - Will continue to follow.

## 2022-05-27 NOTE — PROGRESS NOTE ADULT - PROBLEM SELECTOR PLAN 6
Pt with hx of b/l LE stents, last in [facility] in 2020  - Continue home statin and Plavix
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 العلي outpatient  - Continue home Ciprofloxacin 500 mg q12 hours  - Dr العلي consulted, recs appreciated

## 2022-05-27 NOTE — PROGRESS NOTE ADULT - SUBJECTIVE AND OBJECTIVE BOX
Patient is a 82y old  Male who presents with a chief complaint of SOB and lethargy.       INTERVAL HPI/OVERNIGHT EVENTS: Pt had rapid response this morning for SVT to the 180s that sustained for ~30minutes. SVT resolved after lopressor 5mg IVx1, Valsalva maneuver and carotid massage. Pt states he feels better overall. SOB improved and pt is on RA now. Denies fever, chills, CP, cough.    MEDICATIONS  (STANDING):  apixaban 2.5 milliGRAM(s) Oral every 12 hours  atorvastatin 80 milliGRAM(s) Oral at bedtime  azithromycin   Tablet 250 milliGRAM(s) Oral <User Schedule>  budesonide 160 MICROgram(s)/formoterol 4.5 MICROgram(s) Inhaler 2 Puff(s) Inhalation two times a day  chlorhexidine 2% Cloths 1 Application(s) Topical daily  ciprofloxacin     Tablet 500 milliGRAM(s) Oral every 12 hours  clopidogrel Tablet 75 milliGRAM(s) Oral daily  dextrose 5%. 1000 milliLiter(s) (50 mL/Hr) IV Continuous <Continuous>  dextrose 5%. 1000 milliLiter(s) (100 mL/Hr) IV Continuous <Continuous>  dextrose 50% Injectable 25 Gram(s) IV Push once  dextrose 50% Injectable 12.5 Gram(s) IV Push once  dextrose 50% Injectable 25 Gram(s) IV Push once  glucagon  Injectable 1 milliGRAM(s) IntraMuscular once  insulin glargine Injectable (LANTUS) 8 Unit(s) SubCutaneous every morning  insulin lispro (ADMELOG) corrective regimen sliding scale   SubCutaneous three times a day before meals  insulin lispro (ADMELOG) corrective regimen sliding scale   SubCutaneous at bedtime  metoprolol tartrate 50 milliGRAM(s) Oral every 12 hours  montelukast 10 milliGRAM(s) Oral daily  pantoprazole    Tablet 40 milliGRAM(s) Oral before breakfast  predniSONE   Tablet 40 milliGRAM(s) Oral daily  saccharomyces boulardii 250 milliGRAM(s) Oral two times a day  tamsulosin 0.4 milliGRAM(s) Oral at bedtime    MEDICATIONS  (PRN):  ALBUTerol    0.083% 2.5 milliGRAM(s) Nebulizer every 6 hours PRN Shortness of Breath and/or Wheezing  dextrose Oral Gel 15 Gram(s) Oral once PRN Blood Glucose LESS THAN 70 milliGRAM(s)/deciliter        Allergies    No Known Allergies    Intolerances    shellfish (Nausea)      REVIEW OF SYSTEMS:  CONSTITUTIONAL: lethargy improved; No fever or chills  HEENT:  No headache, no sore throat  RESPIRATORY: SOB much improved; No cough, wheezing   CARDIOVASCULAR: No chest pain, palpitations  GASTROINTESTINAL: No abd pain, nausea, vomiting, or diarrhea  GENITOURINARY: No dysuria, frequency, or hematuria  NEUROLOGICAL: no focal weakness or dizziness  MUSCULOSKELETAL: no myalgias     Vital Signs Last 24 Hrs  T(C): 36.6 (26 May 2022 11:39), Max: 36.7 (25 May 2022 16:35)  T(F): 97.8 (26 May 2022 11:39), Max: 98 (25 May 2022 16:35)  HR: 88 (26 May 2022 13:10) (79 - 118)  BP: 130/75 (26 May 2022 11:39) (118/65 - 165/78)  BP(mean): 90 (26 May 2022 11:00) (85 - 112)  RR: 19 (26 May 2022 11:39) (14 - 36)  SpO2: 100% (26 May 2022 13:10) (90% - 100%)    PHYSICAL EXAM:  GENERAL: NAD at rest  HEENT:  anicteric, moist mucous membranes  CHEST/LUNG:  decreased breath sounds b/l, scattered wheezes b/l  HEART:  RRR, S1, S2  ABDOMEN:  BS+, soft, nontender, nondistended  EXTREMITIES: no cyanosis or calf tenderness  NERVOUS SYSTEM: answers questions and follows commands appropriately    LABS:                                   14.0   13.55 )-----------( 304      ( 27 May 2022 07:41 )             46.5     CBC Full  -  ( 27 May 2022 07:41 )  WBC Count : 13.55 K/uL  Hemoglobin : 14.0 g/dL  Hematocrit : 46.5 %  Platelet Count - Automated : 304 K/uL  Mean Cell Volume : 88.9 fl  Mean Cell Hemoglobin : 26.8 pg  Mean Cell Hemoglobin Concentration : 30.1 gm/dL  Auto Neutrophil # : 10.06 K/uL  Auto Lymphocyte # : 1.96 K/uL  Auto Monocyte # : 1.42 K/uL  Auto Eosinophil # : 0.03 K/uL  Auto Basophil # : 0.02 K/uL  Auto Neutrophil % : 74.3 %  Auto Lymphocyte % : 14.5 %  Auto Monocyte % : 10.5 %  Auto Eosinophil % : 0.2 %  Auto Basophil % : 0.1 %    05-27    141  |  104  |  37<H>  ----------------------------<  164<H>  4.3   |  26  |  1.60<H>    Ca    9.8      27 May 2022 07:41  Phos  2.8     05-27  Mg     2.2     05-27    TPro  7.5  /  Alb  3.7  /  TBili  0.4  /  DBili  x   /  AST  19  /  ALT  31  /  AlkPhos  94  05-27       CAPILLARY BLOOD GLUCOSE      POCT Blood Glucose.: 256 mg/dL (27 May 2022 17:02)  POCT Blood Glucose.: 253 mg/dL (27 May 2022 11:50)  POCT Blood Glucose.: 194 mg/dL (27 May 2022 07:41)          Culture - Blood (collected 05-25-22 @ 12:28)  Source: .Blood Blood  Preliminary Report (05-26-22 @ 13:01):    No growth to date.    Culture - Blood (collected 05-25-22 @ 12:28)  Source: .Blood Blood  Preliminary Report (05-26-22 @ 13:01):    No growth to date.        RADIOLOGY & ADDITIONAL TESTS:    Personally reviewed.     Consultant(s) Notes Reviewed:  [x] YES  [ ] NO       Patient is a 82y old  Male who presents with a chief complaint of SOB and lethargy.        INTERVAL HPI/OVERNIGHT EVENTS: Pt had rapid response this morning for SVT to the 180s that sustained for ~30minutes. SVT resolved after lopressor 5mg IVx1, Valsalva maneuver and carotid massage. Pt states he feels better overall. SOB improved and pt is on RA now. Denies fever, chills, CP, cough.    MEDICATIONS  (STANDING):  apixaban 2.5 milliGRAM(s) Oral every 12 hours  atorvastatin 80 milliGRAM(s) Oral at bedtime  azithromycin   Tablet 250 milliGRAM(s) Oral <User Schedule>  budesonide 160 MICROgram(s)/formoterol 4.5 MICROgram(s) Inhaler 2 Puff(s) Inhalation two times a day  chlorhexidine 2% Cloths 1 Application(s) Topical daily  ciprofloxacin     Tablet 500 milliGRAM(s) Oral every 12 hours  clopidogrel Tablet 75 milliGRAM(s) Oral daily  dextrose 5%. 1000 milliLiter(s) (50 mL/Hr) IV Continuous <Continuous>  dextrose 5%. 1000 milliLiter(s) (100 mL/Hr) IV Continuous <Continuous>  dextrose 50% Injectable 25 Gram(s) IV Push once  dextrose 50% Injectable 12.5 Gram(s) IV Push once  dextrose 50% Injectable 25 Gram(s) IV Push once  glucagon  Injectable 1 milliGRAM(s) IntraMuscular once  insulin glargine Injectable (LANTUS) 8 Unit(s) SubCutaneous every morning  insulin lispro (ADMELOG) corrective regimen sliding scale   SubCutaneous three times a day before meals  insulin lispro (ADMELOG) corrective regimen sliding scale   SubCutaneous at bedtime  metoprolol tartrate 50 milliGRAM(s) Oral every 12 hours  montelukast 10 milliGRAM(s) Oral daily  pantoprazole    Tablet 40 milliGRAM(s) Oral before breakfast  predniSONE   Tablet 40 milliGRAM(s) Oral daily  saccharomyces boulardii 250 milliGRAM(s) Oral two times a day  tamsulosin 0.4 milliGRAM(s) Oral at bedtime    MEDICATIONS  (PRN):  ALBUTerol    0.083% 2.5 milliGRAM(s) Nebulizer every 6 hours PRN Shortness of Breath and/or Wheezing  dextrose Oral Gel 15 Gram(s) Oral once PRN Blood Glucose LESS THAN 70 milliGRAM(s)/deciliter        Allergies    No Known Allergies    Intolerances    shellfish (Nausea)      REVIEW OF SYSTEMS:  CONSTITUTIONAL: lethargy improved; No fever or chills  HEENT:  No headache, no sore throat  RESPIRATORY: SOB much improved; No cough, wheezing   CARDIOVASCULAR: No chest pain, palpitations  GASTROINTESTINAL: No abd pain, nausea, vomiting, or diarrhea  GENITOURINARY: No dysuria, frequency, or hematuria  NEUROLOGICAL: no focal weakness or dizziness  MUSCULOSKELETAL: no myalgias     Vital Signs Last 24 Hrs  T(C): 36.6 (26 May 2022 11:39), Max: 36.7 (25 May 2022 16:35)  T(F): 97.8 (26 May 2022 11:39), Max: 98 (25 May 2022 16:35)  HR: 88 (26 May 2022 13:10) (79 - 118)  BP: 130/75 (26 May 2022 11:39) (118/65 - 165/78)  BP(mean): 90 (26 May 2022 11:00) (85 - 112)  RR: 19 (26 May 2022 11:39) (14 - 36)  SpO2: 100% (26 May 2022 13:10) (90% - 100%)    PHYSICAL EXAM:  GENERAL: NAD at rest  HEENT:  anicteric, moist mucous membranes  CHEST/LUNG:  decreased breath sounds b/l, scattered wheezes b/l  HEART:  RRR, S1, S2  ABDOMEN:  BS+, soft, nontender, nondistended  EXTREMITIES: no cyanosis or calf tenderness  NERVOUS SYSTEM: answers questions and follows commands appropriately    LABS:                                   14.0   13.55 )-----------( 304      ( 27 May 2022 07:41 )             46.5     CBC Full  -  ( 27 May 2022 07:41 )  WBC Count : 13.55 K/uL  Hemoglobin : 14.0 g/dL  Hematocrit : 46.5 %  Platelet Count - Automated : 304 K/uL  Mean Cell Volume : 88.9 fl  Mean Cell Hemoglobin : 26.8 pg  Mean Cell Hemoglobin Concentration : 30.1 gm/dL  Auto Neutrophil # : 10.06 K/uL  Auto Lymphocyte # : 1.96 K/uL  Auto Monocyte # : 1.42 K/uL  Auto Eosinophil # : 0.03 K/uL  Auto Basophil # : 0.02 K/uL  Auto Neutrophil % : 74.3 %  Auto Lymphocyte % : 14.5 %  Auto Monocyte % : 10.5 %  Auto Eosinophil % : 0.2 %  Auto Basophil % : 0.1 %    05-27    141  |  104  |  37<H>  ----------------------------<  164<H>  4.3   |  26  |  1.60<H>    Ca    9.8      27 May 2022 07:41  Phos  2.8     05-27  Mg     2.2     05-27    TPro  7.5  /  Alb  3.7  /  TBili  0.4  /  DBili  x   /  AST  19  /  ALT  31  /  AlkPhos  94  05-27       CAPILLARY BLOOD GLUCOSE      POCT Blood Glucose.: 256 mg/dL (27 May 2022 17:02)  POCT Blood Glucose.: 253 mg/dL (27 May 2022 11:50)  POCT Blood Glucose.: 194 mg/dL (27 May 2022 07:41)          Culture - Blood (collected 05-25-22 @ 12:28)  Source: .Blood Blood  Preliminary Report (05-26-22 @ 13:01):    No growth to date.    Culture - Blood (collected 05-25-22 @ 12:28)  Source: .Blood Blood  Preliminary Report (05-26-22 @ 13:01):    No growth to date.        RADIOLOGY & ADDITIONAL TESTS:    Personally reviewed.     Consultant(s) Notes Reviewed:  [x] YES  [ ] NO

## 2022-05-27 NOTE — PROVIDER CONTACT NOTE (OTHER) - SITUATION
Tele tech called that patient heart was sustaining in the 180s. Upon assessing patient, patient denies any immediate chest pain or palpitations, appears in no acute distress.

## 2022-05-27 NOTE — PHYSICAL THERAPY INITIAL EVALUATION ADULT - PLANNED THERAPY INTERVENTIONS, PT EVAL
2-3 Sessions: Stair Negotiation - Ascend/descend 12 steps with bilateral handrails independently./bed mobility training/gait training/transfer training

## 2022-05-27 NOTE — CONSULT NOTE ADULT - SUBJECTIVE AND OBJECTIVE BOX
Date/Time Patient Seen:  		  Referring MD:   Data Reviewed	       Patient is a 82y old  Male who presents with a chief complaint of COPD exacerbation (27 May 2022 08:12)      Subjective/HPI  vs noted  labs reviewed  imaging reviewed  H and P reviewed  ER provider note reviewed  old records reviewed  spoke with Pulm MD - Dr Hernandez   pt is on NIPPV overnight for COURTNEY  awake  alert  verbal     This is an 83 y/o M with a PMHx of COPD (on home O2 prn and nocturnal BIPAP), CAD (s/p 3v CABG , stents in 2019), HLD, T2DM, HTN, PAT (on Eliquis), PVD (s/p stenting in b/l legs), L femur chronic osteomyelitis (s/p recent hospitalization for intramedullary abscess drainage, now on chronic ciprofloxacin), presenting with 1 day of increasing respiratory distress. At home, pt started feeling short of breath last night, with worsening symptoms into this morning. SpO2 was in the low 80s despite increasing home O2 to 6 L. Pt was brought in by EMS on CPAP, with some symptomatic improvement. Pt currently feels a little uncomfortable with the BiPAP mask on, but otherwise notes improvement in his breathing. As per wife, the only symptoms he had prior to the onset of his respiratory distress were fatigue and rhinorrhea. Of note, pt has environmental allergies and at home, his room air conditioner was turned on for the first time since the fall. Pt denies any fevers, chills, chest pain, palpitations, abd pain, constipation, diarrhea, dysuria. Pt has no other complaints or concerns at this time.  PAST MEDICAL & SURGICAL HISTORY:  Diabetes Mellitus, Type II    CAD (Coronary Artery Disease)  s/p 3v CABG ; stents placed in [place] in     Dyslipidemia    Asymptomatic Peripheral Vascular Disease    Osteomyelitis    COPD (chronic obstructive pulmonary disease)  on 2L at home and BiPAP at night; intubated     Diabetes mellitus    Hypertension    PVD (peripheral vascular disease)    History of PAT (paroxysmal atrial tachycardia)    CABG (Coronary Artery Bypass Graft)      Compound fracture  left leg    S/P primary angioplasty with coronary stent    FAMILY HISTORY:  Family hx of lung cancer, brother,  age 82, used to smoke with pt    Sibling  Still living? Unknown  Family history of diabetes mellitus, Age at diagnosis: Age Unknown.     Social History:  Social History (marital status, living situation, occupation, tobacco use, alcohol and drug use, and sexual history): Lives at home with wife  Able to ambulate independently, uses cane when in pain  Independent with most ADLs, sometimes needs assistance from his wife.  Former smoker (20 pack-year hx), quit 30 years ago  Denies alcohol use  Former frequent marijuana user (smoking), quit 3 years ago     Tobacco Screening:  · Core Measure Site	Yes  · Has the patient used tobacco in the past 30 days?	No    Risk Assessment:    Present on Admission:  Deep Venous Thrombosis	no  Pulmonary Embolus	no     Heart Failure:  Does this patient have a history of or has been diagnosed with heart failure? no.      Medication list         MEDICATIONS  (STANDING):  albuterol/ipratropium for Nebulization 3 milliLiter(s) Nebulizer every 6 hours  apixaban 2.5 milliGRAM(s) Oral every 12 hours  atorvastatin 80 milliGRAM(s) Oral at bedtime  azithromycin   Tablet 250 milliGRAM(s) Oral <User Schedule>  budesonide 160 MICROgram(s)/formoterol 4.5 MICROgram(s) Inhaler 2 Puff(s) Inhalation two times a day  chlorhexidine 2% Cloths 1 Application(s) Topical daily  ciprofloxacin     Tablet 500 milliGRAM(s) Oral every 12 hours  clopidogrel Tablet 75 milliGRAM(s) Oral daily  dextrose 5%. 1000 milliLiter(s) (100 mL/Hr) IV Continuous <Continuous>  dextrose 5%. 1000 milliLiter(s) (50 mL/Hr) IV Continuous <Continuous>  dextrose 50% Injectable 25 Gram(s) IV Push once  dextrose 50% Injectable 12.5 Gram(s) IV Push once  dextrose 50% Injectable 25 Gram(s) IV Push once  glucagon  Injectable 1 milliGRAM(s) IntraMuscular once  insulin glargine Injectable (LANTUS) 8 Unit(s) SubCutaneous every morning  insulin lispro (ADMELOG) corrective regimen sliding scale   SubCutaneous three times a day before meals  insulin lispro (ADMELOG) corrective regimen sliding scale   SubCutaneous at bedtime  metoprolol tartrate 25 milliGRAM(s) Oral every 12 hours  montelukast 10 milliGRAM(s) Oral daily  pantoprazole    Tablet 40 milliGRAM(s) Oral before breakfast  predniSONE   Tablet 40 milliGRAM(s) Oral daily  saccharomyces boulardii 250 milliGRAM(s) Oral two times a day  tamsulosin 0.4 milliGRAM(s) Oral at bedtime    MEDICATIONS  (PRN):  ALBUTerol    0.083% 2.5 milliGRAM(s) Nebulizer every 6 hours PRN Shortness of Breath and/or Wheezing  dextrose Oral Gel 15 Gram(s) Oral once PRN Blood Glucose LESS THAN 70 milliGRAM(s)/deciliter         Vitals log        ICU Vital Signs Last 24 Hrs  T(C): 36.7 (27 May 2022 05:35), Max: 36.7 (26 May 2022 20:11)  T(F): 98 (27 May 2022 05:35), Max: 98.1 (26 May 2022 20:11)  HR: 113 (27 May 2022 08:45) (84 - 180)  BP: 129/73 (27 May 2022 05:35) (129/73 - 145/74)  BP(mean): 90 (26 May 2022 11:00) (90 - 90)  ABP: --  ABP(mean): --  RR: 18 (27 May 2022 05:35) (17 - 25)  SpO2: 98% (27 May 2022 08:45) (93% - 100%)           Input and Output:  I&O's Detail    26 May 2022 07:01  -  27 May 2022 07:00  --------------------------------------------------------  IN:    Oral Fluid: 240 mL  Total IN: 240 mL    OUT:    Voided (mL): 760 mL  Total OUT: 760 mL    Total NET: -520 mL          Lab Data                        14.0   13.55 )-----------( 304      ( 27 May 2022 07:41 )             46.5         141  |  104  |  37<H>  ----------------------------<  164<H>  4.3   |  26  |  1.60<H>    Ca    9.8      27 May 2022 07:41  Phos  2.8       Mg     2.2         TPro  7.5  /  Alb  3.7  /  TBili  0.4  /  DBili  x   /  AST  19  /  ALT  31  /  AlkPhos  94      ABG - ( 26 May 2022 04:55 )  pH, Arterial: 7.41  pH, Blood: x     /  pCO2: 48    /  pO2: 82    / HCO3: 30    / Base Excess: 5.8   /  SaO2: 97.1                    Review of Systems	  sob  rhodes  weakness      Objective     Physical Examination  alert  verbal  awake  cn grossly int  heart s1s2  lung dc BS  abd soft        Pertinent Lab findings & Imaging      Gonzáles:  NO   Adequate UO     I&O's Detail    26 May 2022 07:01  -  27 May 2022 07:00  --------------------------------------------------------  IN:    Oral Fluid: 240 mL  Total IN: 240 mL    OUT:    Voided (mL): 760 mL  Total OUT: 760 mL    Total NET: -520 mL               Discussed with:     Cultures:	        Radiology    ACC: [id number] EXAM:  XR CHEST PORTABLE IMMED 1V                          PROCEDURE DATE:  2022          INTERPRETATION:  INDICATION: Shortness of Breath    PRIORS: 2021    VIEWS: Portable AP radiography of the chest performed.    FINDINGS: Heart size appears within normal limits. Status post   sternotomy. Arterial deficiency noted within the upper lung fields and   flattening of the diaphragm suggest COPD/emphysema. There is a suggestion   for nodular opacities noted overlying the upper lung fields on this   limited portable radiograph, indeterminate. Follow-up PA and lateral   radiography of the chest recommended. No evidence for lobar   consolidation. No pleural effusion. No pneumothorax. No mediastinal   shift. No acute osseous fractures.    IMPRESSION: Arterial deficiency noted within the upper lung fields and   flattening of the diaphragm suggest COPD/emphysema. There is a suggestion   for nodular opacities noted overlying the upper lung fields on this   limited portable radiograph, indeterminate. Follow-up PA and lateral   radiography of the chest recommended.    --- End of Report ---            ELEAZAR CASTAÑEDA MD; Attending Radiologist  This document has been electronically signed. May 25 2022  8:57AM

## 2022-05-27 NOTE — PROGRESS NOTE ADULT - PROBLEM SELECTOR PLAN 7
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 العلي outpatient  - Continue home Ciprofloxacin 500 mg q12 hours  - Dr العلي consulted, recs appreciated
- Continue Lopressor 25 mg q12h with hold parameters  - holding Losartan for now given MERRILL  - Monitor routine hemodynamics

## 2022-05-27 NOTE — PROGRESS NOTE ADULT - PROBLEM SELECTOR PLAN 4
Pt with admission creatinine of 1.7, had Cr of 1.6 in 03/2022 and some lower values of ~1.2 in 12/2021, unclear if has had progression of CKD or having MERRILL on CKD currently  - Avoid nephrotoxins, monitor renal indices  - holding Losartan for now  - Cr stable at 1.6
S/p CABG in 2004, stents in 2019  - Continue home Lopressor 25 mg BID with hold parameters  - Continue home statin as atorvastatin via therapeutic interchange  - Pt not on aspirin as per med rec, continue home Plavix 75 mg daily

## 2022-05-27 NOTE — CONSULT NOTE ADULT - ATTENDING COMMENTS
patient with PSVT, likely reactive to acute illness.  TO optimize BB as tolerated by HR, BP, and COPD.  For now to increase to metoprolol 50 bid.  Can use IV metoprolol as needed for recurrent SVT, vs adenosine if sustained and not responsive  To follow while admitted.

## 2022-05-27 NOTE — CHART NOTE - NSCHARTNOTEFT_GEN_A_CORE
Resident Rapid Response Note    Rapid response was called at 0614 for SVT in 180's.    Events leading up to Rapid Response: Patient maneuvered in bed, heart rate jumped to 180's per telemetry. EKG performed bedside. Rapid response called.    Patient was seen and examined at the bedside by the rapid response team and resident medicine team. Dr. Zavala / ICU PA at bedside. Patient on BIPAP. Denies chest pain, palpitations, chest pressure.    Rapid Response Vital Signs:  BP: 171/96  HR: 182  RR:   SpO2: 95% on BIPAP  Temp: 97.7  FS: 154    Vital Signs Last 24 Hrs  T(C): 36.7 (27 May 2022 05:35), Max: 36.7 (26 May 2022 20:11)  T(F): 98 (27 May 2022 05:35), Max: 98.1 (26 May 2022 20:11)  HR: 180 (27 May 2022 06:12) (79 - 180)  BP: 129/73 (27 May 2022 05:35) (129/73 - 165/78)  BP(mean): 90 (26 May 2022 11:00) (90 - 112)  RR: 18 (27 May 2022 05:35) (17 - 36)  SpO2: 99% (27 May 2022 06:12) (90% - 100%)    Physical Exam:  General: Well developed, well nourished, in no acute distress, on BIPAP  HEENT: NCAT, PERRLA, EOMI bl, moist mucous membranes   Neck: Supple, nontender, no mass  Neurology: A&Ox3, nonfocal  Respiratory: CTA B/L, No wheezing, rales, or rhonchi  CV: tachycardic  Abdominal: Soft, nontender, non-distended, normoactive bowel sounds  Extremities: No C/C/E  Skin: warm, dry, normal color, no obvious rash or abnormal lesions      Assessment/Plan:  81 y/o M with a PMHx of COPD (on home O2 PRN and nocturnal BIPAP, on chronic azithromycin and low-dose prednisone), CAD (s/p 3V CABG 2004, stents in 2019), HLD, T2DM, HTN, PAT (on Eliquis), PVD (s/p stenting in b/l legs), L femur chronic osteomyelitis (s/p recent hospitalization for intramedullary abscess drainage, now on chronic ciprofloxacin), presenting with 1 day of increasing SOB, respiratory distress and lethargy admitted with acute on chronic hypoxic and hypercapnic respiratory failure 2/2 COPD exacerbation. Rapid response called for patient in SVT in 180's.  - Other vitals stable  - EKG showed SVT @188  - Lopressor 5mg IVP given - HR now in sinus tachycardia 150's  - AM dose Metoprolol 25mg PO given  - F/u AM labs, K, Mag, Phos  - Discussed with Dr. Zavala , who agrees with above plan.  -Will continue to follow, RN to call if any changes. Resident Rapid Response Note    Rapid response was called at 0614 for SVT in 180's.    Events leading up to Rapid Response: Patient maneuvered in bed, heart rate jumped to 180's per telemetry. EKG performed bedside. Rapid response called.    Patient was seen and examined at the bedside by the rapid response team and resident medicine team. Dr. Zavala / ICU PA at bedside. Patient on BIPAP. Denies chest pain, palpitations, chest pressure.    Rapid Response Vital Signs:  BP: 171/96  HR: 182  RR:   SpO2: 95% on BIPAP  Temp: 97.7  FS: 154    Vital Signs Last 24 Hrs  T(C): 36.7 (27 May 2022 05:35), Max: 36.7 (26 May 2022 20:11)  T(F): 98 (27 May 2022 05:35), Max: 98.1 (26 May 2022 20:11)  HR: 180 (27 May 2022 06:12) (79 - 180)  BP: 129/73 (27 May 2022 05:35) (129/73 - 165/78)  BP(mean): 90 (26 May 2022 11:00) (90 - 112)  RR: 18 (27 May 2022 05:35) (17 - 36)  SpO2: 99% (27 May 2022 06:12) (90% - 100%)    Physical Exam:  General: Well developed, well nourished, in no acute distress, on BIPAP  HEENT: NCAT, PERRLA, EOMI bl, moist mucous membranes   Neck: Supple, nontender, no mass  Neurology: A&Ox3, nonfocal  Respiratory: CTA B/L, No wheezing, rales, or rhonchi  CV: tachycardic  Abdominal: Soft, nontender, non-distended, normoactive bowel sounds  Extremities: No C/C/E  Skin: warm, dry, normal color, no obvious rash or abnormal lesions      Assessment/Plan:  83 y/o M with a PMHx of COPD (on home O2 PRN and nocturnal BIPAP, on chronic azithromycin and low-dose prednisone), CAD (s/p 3V CABG 2004, stents in 2019), HLD, T2DM, HTN, PAT (on Eliquis), PVD (s/p stenting in b/l legs), L femur chronic osteomyelitis (s/p recent hospitalization for intramedullary abscess drainage, now on chronic ciprofloxacin), presenting with 1 day of increasing SOB, respiratory distress and lethargy admitted with acute on chronic hypoxic and hypercapnic respiratory failure 2/2 COPD exacerbation. Rapid response called for patient in SVT in 180's.  - Other vitals stable  - EKG showed SVT @188  - Lopressor 5mg IVP given, carotid massage done bedside - HR now in sinus tachycardia 150's  - AM dose Metoprolol 25mg PO given  - F/u AM labs: trop, K, Mg, Phos  - Discussed with Dr. Zavala , who agrees with above plan.  - Will continue to follow, RN to call if any changes. Resident Rapid Response Note    Rapid response was called at 0614 for SVT in 180's.    Events leading up to Rapid Response: Patient maneuvered in bed, heart rate jumped to 180's per telemetry. EKG performed bedside. Rapid response called.    Patient was seen and examined at the bedside by the rapid response team and resident medicine team. Dr. Zavala / ICU PA at bedside. Patient on BIPAP. Denies chest pain, palpitations, chest pressure.    Rapid Response Vital Signs:  BP: 171/96  HR: 182  RR:   SpO2: 95% on BIPAP  Temp: 97.7  FS: 154    Vital Signs Last 24 Hrs  T(C): 36.7 (27 May 2022 05:35), Max: 36.7 (26 May 2022 20:11)  T(F): 98 (27 May 2022 05:35), Max: 98.1 (26 May 2022 20:11)  HR: 180 (27 May 2022 06:12) (79 - 180)  BP: 129/73 (27 May 2022 05:35) (129/73 - 165/78)  BP(mean): 90 (26 May 2022 11:00) (90 - 112)  RR: 18 (27 May 2022 05:35) (17 - 36)  SpO2: 99% (27 May 2022 06:12) (90% - 100%)    Physical Exam:  General: Well developed, well nourished, in no acute distress, on BIPAP  HEENT: NCAT, PERRLA, EOMI bl, moist mucous membranes   Neck: Supple, nontender, no mass  Neurology: A&Ox3, nonfocal  Respiratory: CTA B/L, No wheezing, rales, or rhonchi  CV: tachycardic  Abdominal: Soft, nontender, non-distended, normoactive bowel sounds  Extremities: No C/C/E  Skin: warm, dry, normal color, no obvious rash or abnormal lesions      Assessment/Plan:  83 y/o M with a PMHx of COPD (on home O2 PRN and nocturnal BIPAP, on chronic azithromycin and low-dose prednisone), CAD (s/p 3V CABG 2004, stents in 2019), HLD, T2DM, HTN, PAT (on Eliquis), PVD (s/p stenting in b/l legs), L femur chronic osteomyelitis (s/p recent hospitalization for intramedullary abscess drainage, now on chronic ciprofloxacin), presenting with 1 day of increasing SOB, respiratory distress and lethargy admitted with acute on chronic hypoxic and hypercapnic respiratory failure 2/2 COPD exacerbation. Rapid response called for patient in SVT in 180's.  - Other vitals stable  - EKG showed SVT @188  - Lopressor 5mg IVP given, carotid massage done bedside - HR now in sinus tachycardia 150's  - AM dose Metoprolol 25mg PO given  - F/u AM labs: trop, K, Mg, Phos  - Discussed with Dr. Zavala , who agrees with above plan.  - Will continue to follow, RN to call if any changes.    **FOLLOW UP 8:30AM  - Resting comfortably in bed  - Denies subjective palpitations, chest pain  - As per tele tech, patient currently in SR, HRs 100-110s  - Will continue to follow

## 2022-05-27 NOTE — CONSULT NOTE ADULT - ASSESSMENT
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

## 2022-05-27 NOTE — PROGRESS NOTE ADULT - TIME BILLING
Note written by attending, see above.  Time spent: 40min. More than 50% of the visit was spent counseling the patient and pt's wife on medical condition - acute on chronic hypoxic and hypercapnic respiratory failure 2/2 COPD exacerbation.
Note written by attending, see above.  Time spent: 40min. More than 50% of the visit was spent counseling the patient and pt's wife on medical condition - acute on chronic hypoxic and hypercapnic respiratory failure 2/2 COPD exacerbation, SVT.

## 2022-05-27 NOTE — PROGRESS NOTE ADULT - SUBJECTIVE AND OBJECTIVE BOX
Patient is a 82y old  Male who presents with a chief complaint of acute on chronic respiratory failure with hypoxia and hypercarbia due to COPD exacerbation (26 May 2022 15:45)      BRIEF HOSPITAL COURSE: 83 y/o male with pmhx of eosinophilic asthma/copd, former smoker, chornic respiratory failure on home o2 and nocturnal bipap, cardiac arrest in 2018 due to respiratory failure, HTN, HLD, DM II, CAD s/p triple cabg in 2004 and PCI in 2019, PVD s/p b/l stents on plavix, afib on eliquis, bph, left femur fracture with intramedullary abscess s/;p drainage in 2021 admitted with acute copd to ICU, transferred out 5/26.    Events last 24 hours: RRT called for SVT, hemodynamically stable and asymptomatic. vagal manuever and carotid massage performed and given 5 mg IVP lopressor with resolution. satting well on bipap 21%, asymptomatic.    PAST MEDICAL & SURGICAL HISTORY:  Diabetes Mellitus, Type II      CAD (Coronary Artery Disease)  s/p 3v CABG 2004; stents placed in [place] in 2019      Dyslipidemia      Osteomyelitis      COPD (chronic obstructive pulmonary disease)  on 2L at home and BiPAP at night; intubated 6/18      Hypertension      PVD (peripheral vascular disease)      History of PAT (paroxysmal atrial tachycardia)      CABG (Coronary Artery Bypass Graft)  2004      Compound fracture  left leg      S/P primary angioplasty with coronary stent          Review of Systems:  CONSTITUTIONAL: No fever, chills, or fatigue  EYES: No eye pain, visual disturbances, or discharge  ENMT:  No difficulty hearing, tinnitus, vertigo; No sinus or throat pain  NECK: No pain or stiffness  RESPIRATORY: No cough, wheezing, chills or hemoptysis; No shortness of breath  CARDIOVASCULAR: No chest pain, palpitations, dizziness, or leg swelling  GASTROINTESTINAL: No abdominal or epigastric pain. No nausea, vomiting, or hematemesis; No diarrhea or constipation. No melena or hematochezia.  GENITOURINARY: No dysuria, frequency, hematuria, or incontinence  NEUROLOGICAL: No headaches, memory loss, loss of strength, numbness, or tremors  SKIN: No itching, burning, rashes, or lesions   MUSCULOSKELETAL: No joint pain or swelling; No muscle, back, or extremity pain  PSYCHIATRIC: No depression, anxiety, mood swings, or difficulty sleeping      Medications:  azithromycin   Tablet 250 milliGRAM(s) Oral <User Schedule>  ciprofloxacin     Tablet 500 milliGRAM(s) Oral every 12 hours    metoprolol tartrate 25 milliGRAM(s) Oral every 12 hours  metoprolol tartrate Injectable 5 milliGRAM(s) IV Push once  tamsulosin 0.4 milliGRAM(s) Oral at bedtime    ALBUTerol    0.083% 2.5 milliGRAM(s) Nebulizer every 6 hours PRN  albuterol/ipratropium for Nebulization 3 milliLiter(s) Nebulizer every 6 hours  budesonide 160 MICROgram(s)/formoterol 4.5 MICROgram(s) Inhaler 2 Puff(s) Inhalation two times a day  montelukast 10 milliGRAM(s) Oral daily        apixaban 2.5 milliGRAM(s) Oral every 12 hours  clopidogrel Tablet 75 milliGRAM(s) Oral daily    pantoprazole    Tablet 40 milliGRAM(s) Oral before breakfast      atorvastatin 80 milliGRAM(s) Oral at bedtime  dextrose 50% Injectable 25 Gram(s) IV Push once  dextrose 50% Injectable 12.5 Gram(s) IV Push once  dextrose 50% Injectable 25 Gram(s) IV Push once  dextrose Oral Gel 15 Gram(s) Oral once PRN  glucagon  Injectable 1 milliGRAM(s) IntraMuscular once  insulin glargine Injectable (LANTUS) 8 Unit(s) SubCutaneous every morning  insulin lispro (ADMELOG) corrective regimen sliding scale   SubCutaneous three times a day before meals  insulin lispro (ADMELOG) corrective regimen sliding scale   SubCutaneous at bedtime  predniSONE   Tablet 40 milliGRAM(s) Oral daily    dextrose 5%. 1000 milliLiter(s) IV Continuous <Continuous>  dextrose 5%. 1000 milliLiter(s) IV Continuous <Continuous>      chlorhexidine 2% Cloths 1 Application(s) Topical daily    saccharomyces boulardii 250 milliGRAM(s) Oral two times a day          ICU Vital Signs Last 24 Hrs  T(C): 36.7 (27 May 2022 05:35), Max: 36.7 (26 May 2022 20:11)  T(F): 98 (27 May 2022 05:35), Max: 98.1 (26 May 2022 20:11)  HR: 180 (27 May 2022 06:12) (79 - 180)  BP: 129/73 (27 May 2022 05:35) (129/73 - 165/78)  BP(mean): 90 (26 May 2022 11:00) (90 - 112)  ABP: --  ABP(mean): --  RR: 18 (27 May 2022 05:35) (17 - 36)  SpO2: 99% (27 May 2022 06:12) (90% - 100%)      ABG - ( 26 May 2022 04:55 )  pH, Arterial: 7.41  pH, Blood: x     /  pCO2: 48    /  pO2: 82    / HCO3: 30    / Base Excess: 5.8   /  SaO2: 97.1                I&O's Detail    25 May 2022 07:01  -  26 May 2022 07:00  --------------------------------------------------------  IN:    Oral Fluid: 100 mL  Total IN: 100 mL    OUT:    Voided (mL): 1480 mL  Total OUT: 1480 mL    Total NET: -1380 mL      26 May 2022 07:01  -  27 May 2022 06:23  --------------------------------------------------------  IN:    Oral Fluid: 240 mL  Total IN: 240 mL    OUT:    Voided (mL): 760 mL  Total OUT: 760 mL    Total NET: -520 mL            LABS:                        13.3   10.67 )-----------( 309      ( 26 May 2022 07:27 )             45.4     05-26    145  |  107  |  34<H>  ----------------------------<  171<H>  4.5   |  32<H>  |  1.70<H>    Ca    10.2<H>      26 May 2022 07:27  Phos  4.3     05-26  Mg     2.9     05-26    TPro  7.3  /  Alb  3.8  /  TBili  0.3  /  DBili  x   /  AST  17  /  ALT  31  /  AlkPhos  88  05-26          CAPILLARY BLOOD GLUCOSE      POCT Blood Glucose.: 154 mg/dL (27 May 2022 06:16)    PT/INR - ( 26 May 2022 07:27 )   PT: 12.7 sec;   INR: 1.08 ratio             CULTURES:  Culture Results:   No growth to date. (05-25 @ 12:28)  Culture Results:   No growth to date. (05-25 @ 12:28)  Rapid RVP Result: NotDetec (05-25 @ 07:48)      Physical Examination:    General: No acute distress.      HEENT: Pupils equal, reactive to light.  Symmetric.    PULM: Clear to auscultation bilaterally, no significant sputum production    NECK: Supple, no lymphadenopathy, trachea midline    CVS: Regular rate and rhythm, no murmurs, rubs, or gallops    ABD: Soft, nondistended, nontender, normoactive bowel sounds, no masses    EXT: No edema, nontender    SKIN: Warm and well perfused, no rashes noted.    NEURO: Alert, oriented, interactive, nonfocal      RADIOLOGY: reviewed    CRITICAL CARE TIME SPENT:   33 minutes of critical care time spent providing medical care for patient's acute illness/conditions that impairs at least one vital organ system and/or poses a high risk of imminent or life threatening deterioration in the patient's condition. It includes time spent evaluating and treating the patient's acute illness as well as time spent reviewing labs, radiology, discussing goals of care with patient and/or patient's family, and discussing the case with a multidisciplinary team in an effort to prevent further life threatening deterioration or end organ damage. This time is independent of any procedures performed.

## 2022-05-27 NOTE — PROGRESS NOTE ADULT - PROBLEM SELECTOR PLAN 3
- rapid response this morning for SVT to the 180s that sustained for ~30minutes. SVT resolved after lopressor 5mg IVx1, Valsalva maneuver and carotid massage.  - standing nebs were discontinued  - Lopressor increased to 50 mg BID with hold parameters per cardio ([facility]), recs appreciated  - Pt on Eliquis 5 mg BID at home, continue as 2.5 mg BID while inpatient as pt has MERRILL
Pt with admission creatinine of 1.7, baseline appears to be 1.1-1.2  - Unknown etiology, consider ordering urine studies  - Avoid nephrotoxins, monitor renal indices  - holding Losartan for now

## 2022-05-27 NOTE — PHYSICAL THERAPY INITIAL EVALUATION ADULT - GENERAL OBSERVATIONS, REHAB EVAL
Patient was received and left (SpO2 = 95% on room air) sitting in a chair with telebox and lines in place and call bell in reach.

## 2022-05-27 NOTE — PROGRESS NOTE ADULT - PROBLEM SELECTOR PROBLEM 5
PVD (peripheral vascular disease)
CAD (coronary atherosclerotic disease)
[FreeTextEntry1] : Endocrinology\par history of hypothyroidism - check thyroid panel \par Musculoskeletal \par chronic back/neck pain - secondary to spinal stenosis - continue Lyrica (Pregabalin) 75mg TID p.o.q.d; continue Meloxicam 15mg p.o.q.d. with food as directed \par Endocrinology/Musculoskeletal\par osteoporosis - continue Prolia injections q. 6 months\par Psychiatry\par continue Venlafaxine HCl 25mg TID p.o.q.d. as directed \par Urology\par chronic UTIs - continue Methenamine Hippurate 1 GM p.o.q.d. as directed and OTC Cranberry 405mg BID p.o.q.d.\par incontinence/overactive bladder - continue Trospium Chloride ER 60mg p.o.q.d\par \par check female panel, hemoglobin A1C, and thyroid panel

## 2022-05-27 NOTE — CONSULT NOTE ADULT - SUBJECTIVE AND OBJECTIVE BOX
*********CHARTING IN PROGRESS***********      Patient is a 82y old  Male who presents with a chief complaint of COPD exacerbation (27 May 2022 06:23)      HPI:  This is an 83 y/o M with a PMHx of COPD (on home O2 prn and nocturnal BIPAP), CAD (s/p 3v CABG , stents in 2019), HLD, T2DM, HTN, PAT (on Eliquis), PVD (s/p stenting in b/l legs), L femur chronic osteomyelitis (s/p recent hospitalization for intramedullary abscess drainage, now on chronic ciprofloxacin), presenting with 1 day of increasing respiratory distress. At home, pt started feeling short of breath last night, with worsening symptoms into this morning. SpO2 was in the low 80s despite increasing home O2 to 6 L. Pt was brought in by EMS on CPAP, with some symptomatic improvement. Pt currently feels a little uncomfortable with the BiPAP mask on, but otherwise notes improvement in his breathing. As per wife, the only symptoms he had prior to the onset of his respiratory distress were fatigue and rhinorrhea. Of note, pt has environmental allergies and at home, his room air conditioner was turned on for the first time since the fall. Pt denies any fevers, chills, chest pain, palpitations, abd pain, constipation, diarrhea, dysuria. Pt has no other complaints or concerns at this time.    ED course:  VS T 95.7 F, , /94, RR 26, SpO2 96% on BiPAP  Labs significant for WBC 17.28, HCO3 29, BUN/Cr 30/1.7, ABG 7.23/65/95/27 --> 7.24/63/66/27  EKG NSR, rate 121  CXR showing arterial deficiency in upper lung fields, and flattening of diaphragms c/w COPD/emphysema  Given Duonebs x 2, Solumedrol 125 mg, put on BiPAP in ED  ICU consulted, pt to be admitted to the ICU for further respiratory management   (25 May 2022 11:38)      PAST MEDICAL & SURGICAL HISTORY:  Diabetes Mellitus, Type II      CAD (Coronary Artery Disease)  s/p 3v CABG ; stents [place] in [place]      Dyslipidemia      Osteomyelitis      COPD (chronic obstructive pulmonary disease)  on 2L at home and BiPAP at night; intubated       Hypertension      PVD (peripheral vascular disease)      History of PAT (paroxysmal atrial tachycardia)      CABG (Coronary Artery Bypass Graft)        Compound fracture  left leg      S/P primary angioplasty with coronary stent                ECHO  FINDINGS:      MEDICATIONS  (STANDING):  albuterol/ipratropium for Nebulization 3 milliLiter(s) Nebulizer every 6 hours  apixaban 2.5 milliGRAM(s) Oral every 12 hours  atorvastatin 80 milliGRAM(s) Oral at bedtime  azithromycin   Tablet 250 milliGRAM(s) Oral <User Schedule>  budesonide 160 MICROgram(s)/formoterol 4.5 MICROgram(s) Inhaler 2 Puff(s) Inhalation two times a day  chlorhexidine 2% Cloths 1 Application(s) Topical daily  ciprofloxacin     Tablet 500 milliGRAM(s) Oral every 12 hours  clopidogrel Tablet 75 milliGRAM(s) Oral daily  dextrose 5%. 1000 milliLiter(s) (100 mL/Hr) IV Continuous <Continuous>  dextrose 5%. 1000 milliLiter(s) (50 mL/Hr) IV Continuous <Continuous>  dextrose 50% Injectable 25 Gram(s) IV Push once  dextrose 50% Injectable 12.5 Gram(s) IV Push once  dextrose 50% Injectable 25 Gram(s) IV Push once  glucagon  Injectable 1 milliGRAM(s) IntraMuscular once  insulin glargine Injectable (LANTUS) 8 Unit(s) SubCutaneous every morning  insulin lispro (ADMELOG) corrective regimen sliding scale   SubCutaneous three times a day before meals  insulin lispro (ADMELOG) corrective regimen sliding scale   SubCutaneous at bedtime  metoprolol tartrate 25 milliGRAM(s) Oral every 12 hours  montelukast 10 milliGRAM(s) Oral daily  pantoprazole    Tablet 40 milliGRAM(s) Oral before breakfast  predniSONE   Tablet 40 milliGRAM(s) Oral daily  saccharomyces boulardii 250 milliGRAM(s) Oral two times a day  tamsulosin 0.4 milliGRAM(s) Oral at bedtime    MEDICATIONS  (PRN):  ALBUTerol    0.083% 2.5 milliGRAM(s) Nebulizer every 6 hours PRN Shortness of Breath and/or Wheezing  dextrose Oral Gel 15 Gram(s) Oral once PRN Blood Glucose LESS THAN 70 milliGRAM(s)/deciliter      FAMILY HISTORY:  Family history of diabetes mellitus (Sibling)    Family hx of lung cancer  brother,  age 82, used to smoke with pt      Denies Family history of CAD or early MI      Constitutional: denies fever, chills  HEENT: denies blurry vision, difficulty hearing  Respiratory: denies SOB, LA, cough  Cardiovascular: denies CP, palpitations, orthopnea, PND, LE edema  Gastrointestinal: denies nausea, vomiting, abdominal pain  Genitourinary: denies urinary changes  Skin: Denies rashes, itching  Neurologic: denies headache, weakness, dizziness  Hematology/Oncology: denies bleeding, easy bruising  ROS negative except as noted above      SOCIAL HISTORY:    No tobacco, Alcohol or Ddrug use    Vital Signs Last 24 Hrs  T(C): 36.7 (27 May 2022 05:35), Max: 36.7 (26 May 2022 20:11)  T(F): 98 (27 May 2022 05:35), Max: 98.1 (26 May 2022 20:11)  HR: 180 (27 May 2022 06:12) (84 - 180)  BP: 129/73 (27 May 2022 05:35) (129/73 - 165/78)  BP(mean): 90 (26 May 2022 11:00) (90 - 112)  RR: 18 (27 May 2022 05:35) (17 - 36)  SpO2: 99% (27 May 2022 06:12) (90% - 100%)    Physical Exam:  General: Well developed, well nourished, NAD  HEENT: NCAT, PERRLA, EOMI bl, moist mucous membranes   Neck: Supple, nontender, no mass  Neurology: A&Ox3, nonfocal, sensation intact   Respiratory: CTA B/L, No W/R/R  CV: RRR, +S1/S2, no murmurs, rubs or gallops  Abdominal: Soft, NT, ND +BSx4, no palpable masses  Extremities: No C/C/E, + peripheral pulses  MSK: Normal ROM, no joint erythema or warmth, no joint swelling   Heme: No obvious ecchymosis or petechiae   Skin: warm, dry, normal color      ECG:    I&O's Detail    26 May 2022 07:01  -  27 May 2022 07:00  --------------------------------------------------------  IN:    Oral Fluid: 240 mL  Total IN: 240 mL    OUT:    Voided (mL): 760 mL  Total OUT: 760 mL    Total NET: -520 mL          LABS:                        14.0   13.55 )-----------( 304      ( 27 May 2022 07:41 )             46.5     -    x   |  x   |  37<H>  ----------------------------<  164<H>  x    |  26  |  1.60<H>    Ca    9.8      27 May 2022 07:41  Phos  4.3       Mg     2.9         TPro  x   /  Alb  3.7  /  TBili  x   /  DBili  x   /  AST  19  /  ALT  31  /  AlkPhos  x           PT/INR - ( 26 May 2022 07:27 )   PT: 12.7 sec;   INR: 1.08 ratio             I&O's Summary    26 May 2022 07:01  -  27 May 2022 07:00  --------------------------------------------------------  IN: 240 mL / OUT: 760 mL / NET: -520 mL      BNP  RADIOLOGY & ADDITIONAL STUDIES: Patient is a 82y old  Male who presents with a chief complaint of COPD exacerbation (27 May 2022 06:23)      HPI:  This is an 81 y/o M with a PMHx of COPD (on home O2 prn and nocturnal BIPAP), CAD (s/p 3v CABG 2004, stents in 2019), HLD, T2DM, HTN, PAT (on Eliquis), PVD (s/p stenting in b/l legs), L femur chronic osteomyelitis (s/p recent hospitalization for intramedullary abscess drainage, now on chronic ciprofloxacin), presenting with 1 day of increasing respiratory distress. At home, pt started feeling short of breath last night, with worsening symptoms into this morning. SpO2 was in the low 80s despite increasing home O2 to 6 L. Pt was brought in by EMS on CPAP, with some symptomatic improvement. Pt currently feels a little uncomfortable with the BiPAP mask on, but otherwise notes improvement in his breathing. As per wife, the only symptoms he had prior to the onset of his respiratory distress were fatigue and rhinorrhea. Of note, pt has environmental allergies and at home, his room air conditioner was turned on for the first time since the . Pt denies any fevers, chills, chest pain, palpitations, abd pain, constipation, diarrhea, dysuria. Pt has no other complaints or concerns at this time.    Interval HPI:  Patient was in ICU on BiPAP w/ respiratory failure w/ hypoxia, respiratory acidosis; ABGs now improved; patient on GMF.  RRT called overnight for episode of tachycardia. Patient had rates in the 180s for ~30 minutes. Vagal maneuvers, carotid massage, 5mg lopressor ivp were given, patient's rates went down to the 150s. Patient was asymptomatic throughout this event, and still says he is not experiencing any palpitations, chest pain, SOB worse than baseline. Patient's regular cardiologist is Dr. Rod, patient states his last check-up was ~3 weeks ago, no changes to medication regimen were made.     ED course:  VS T 95.7 F, , /94, RR 26, SpO2 96% on BiPAP  Labs significant for WBC 17.28, HCO3 29, BUN/Cr 30/1.7, ABG 7.23/65/95/27 --> 7.24/63/66/27  EKG NSR, rate 121  CXR showing arterial deficiency in upper lung fields, and flattening of diaphragms c/w COPD/emphysema  Given Duonebs x 2, Solumedrol 125 mg, put on BiPAP in ED  ICU consulted, pt to be admitted to the ICU for further respiratory management   (25 May 2022 11:38)      PAST MEDICAL & SURGICAL HISTORY:  Diabetes Mellitus, Type II      CAD (Coronary Artery Disease)  s/p 3v CABG ; stents [place]       Dyslipidemia      Osteomyelitis      COPD (chronic obstructive pulmonary disease)  on 2L at home and BiPAP at night; intubated       Hypertension      PVD (peripheral vascular disease)      History of PAT (paroxysmal atrial tachycardia)      CABG (Coronary Artery Bypass Graft)        Compound fracture  left leg      S/P primary angioplasty with coronary stent                ECHO   FINDINGS:Technically difficult and limited study  The left ventricular endocardium is not well-visualized. Overall normal   systolic function with an estimated LVEF of 55-60%. Cannot evaluate for   segmental abnormalities  The right ventricle is not well-visualized  Sclerotic trileaflet aortic valve, without AI.  Trace physiologic MR  No significant pericardial effusion.      MEDICATIONS  (STANDING):  albuterol/ipratropium for Nebulization 3 milliLiter(s) Nebulizer every 6 hours  apixaban 2.5 milliGRAM(s) Oral every 12 hours  atorvastatin 80 milliGRAM(s) Oral at bedtime  azithromycin   Tablet 250 milliGRAM(s) Oral <User Schedule>  budesonide 160 MICROgram(s)/formoterol 4.5 MICROgram(s) Inhaler 2 Puff(s) Inhalation two times a day  chlorhexidine 2% Cloths 1 Application(s) Topical daily  ciprofloxacin     Tablet 500 milliGRAM(s) Oral every 12 hours  clopidogrel Tablet 75 milliGRAM(s) Oral daily  dextrose 5%. 1000 milliLiter(s) (100 mL/Hr) IV Continuous <Continuous>  dextrose 5%. 1000 milliLiter(s) (50 mL/Hr) IV Continuous <Continuous>  dextrose 50% Injectable 25 Gram(s) IV Push once  dextrose 50% Injectable 12.5 Gram(s) IV Push once  dextrose 50% Injectable 25 Gram(s) IV Push once  glucagon  Injectable 1 milliGRAM(s) IntraMuscular once  insulin glargine Injectable (LANTUS) 8 Unit(s) SubCutaneous every morning  insulin lispro (ADMELOG) corrective regimen sliding scale   SubCutaneous three times a day before meals  insulin lispro (ADMELOG) corrective regimen sliding scale   SubCutaneous at bedtime  metoprolol tartrate 25 milliGRAM(s) Oral every 12 hours  montelukast 10 milliGRAM(s) Oral daily  pantoprazole    Tablet 40 milliGRAM(s) Oral before breakfast  predniSONE   Tablet 40 milliGRAM(s) Oral daily  saccharomyces boulardii 250 milliGRAM(s) Oral two times a day  tamsulosin 0.4 milliGRAM(s) Oral at bedtime    MEDICATIONS  (PRN):  ALBUTerol    0.083% 2.5 milliGRAM(s) Nebulizer every 6 hours PRN Shortness of Breath and/or Wheezing  dextrose Oral Gel 15 Gram(s) Oral once PRN Blood Glucose LESS THAN 70 milliGRAM(s)/deciliter      FAMILY HISTORY:  Family history of diabetes mellitus (Sibling)    Family hx of lung cancer  brother,  age 82, used to smoke with pt      Denies Family history of CAD or early MI      Constitutional: denies fever, chills  HEENT: denies blurry vision  Respiratory: +baseline SOB, +LA, +cough  Cardiovascular: denies CP, palpitations, orthopnea, PND, LE edema  Gastrointestinal: denies nausea, vomiting, abdominal pain  Genitourinary: denies urinary changes  Skin: Denies rashes  Neurologic: denies headache, weakness, dizziness  Hematology/Oncology: denies bleeding  ROS negative except as noted above      SOCIAL HISTORY:    No tobacco, Alcohol or Drug use    Vital Signs Last 24 Hrs  T(C): 36.7 (27 May 2022 05:35), Max: 36.7 (26 May 2022 20:11)  T(F): 98 (27 May 2022 05:35), Max: 98.1 (26 May 2022 20:11)  HR: 180 (27 May 2022 06:12) (84 - 180)  BP: 129/73 (27 May 2022 05:35) (129/73 - 165/78)  BP(mean): 90 (26 May 2022 11:00) (90 - 112)  RR: 18 (27 May 2022 05:35) (17 - 36)  SpO2: 99% (27 May 2022 06:12) (90% - 100%)    Physical Exam:  General: Well developed, well nourished, NAD  HEENT: NCAT, EOMI bl, moist mucous membranes   Neck: Supple, nontender, no jvd noted  Neurology: A&Ox3, nonfocal, sensation intact   Respiratory: decreased breath sounds b/l, diffuse wheezing, increased expiratory time.  CV: Tachycardic, regular rhythm., +S1/S2, no murmurs noted on exam.  Abdominal: Soft, NT, ND +BS, no palpable masses  Extremities: No C/C/E, + peripheral pulses  Heme: No obvious ecchymosis or petechiae   Skin: warm, dry, normal color      ECG: ekg 6am , marked ST abormality    I&O's Detail    26 May 2022 07:01  -  27 May 2022 07:00  --------------------------------------------------------  IN:    Oral Fluid: 240 mL  Total IN: 240 mL    OUT:    Voided (mL): 760 mL  Total OUT: 760 mL    Total NET: -520 mL          LABS:                        14.0   13.55 )-----------( 304      ( 27 May 2022 07:41 )             46.5     05-    x   |  x   |  37<H>  ----------------------------<  164<H>  x    |  26  |  1.60<H>    Ca    9.8      27 May 2022 07:41  Phos  4.3     05-  Mg     2.9     -    TPro  x   /  Alb  3.7  /  TBili  x   /  DBili  x   /  AST  19  /  ALT  31  /  AlkPhos  x           PT/INR - ( 26 May 2022 07:27 )   PT: 12.7 sec;   INR: 1.08 ratio             I&O's Summary    26 May 2022 07:01  -  27 May 2022 07:00  --------------------------------------------------------  IN: 240 mL / OUT: 760 mL / NET: -520 mL      BNP  RADIOLOGY & ADDITIONAL STUDIES: Patient is a 82y old  Male who presents with a chief complaint of COPD exacerbation (27 May 2022 06:23)      HPI:  This is an 83 y/o M with a PMHx of COPD (on home O2 prn and nocturnal BIPAP), CAD (s/p 3v CABG 2004, stents in 2019), HLD, T2DM, HTN, PAT (on Eliquis), PVD (s/p stenting in b/l legs), L femur chronic osteomyelitis (s/p recent hospitalization for intramedullary abscess drainage, now on chronic ciprofloxacin), presenting with 1 day of increasing respiratory distress. At home, pt started feeling short of breath last night, with worsening symptoms into this morning. SpO2 was in the low 80s despite increasing home O2 to 6 L. Pt was brought in by EMS on CPAP, with some symptomatic improvement. Pt currently feels a little uncomfortable with the BiPAP mask on, but otherwise notes improvement in his breathing. As per wife, the only symptoms he had prior to the onset of his respiratory distress were fatigue and rhinorrhea. Of note, pt has environmental allergies and at home, his room air conditioner was turned on for the first time since the . Pt denies any fevers, chills, chest pain, palpitations, abd pain, constipation, diarrhea, dysuria. Pt has no other complaints or concerns at this time.    Interval HPI:  Patient was in ICU on BiPAP w/ respiratory failure w/ hypoxia, respiratory acidosis; ABGs now improved; patient on GMF.  RRT called overnight for episode of tachycardia. Patient had rates in the 180s for ~30 minutes. Vagal maneuvers, carotid massage, 5mg lopressor ivp were given, patient's rates went down to the 150s. Patient was asymptomatic throughout this event, and still says he is not experiencing any palpitations, chest pain, SOB worse than baseline, dizziness, lightheadedness. Patient's regular cardiologist is Dr. Rod, patient states his last check-up was ~3 weeks ago, no changes to medication regimen were made.     ED course:  VS T 95.7 F, , /94, RR 26, SpO2 96% on BiPAP  Labs significant for WBC 17.28, HCO3 29, BUN/Cr 30/1.7, ABG 7.23/65/95/27 --> 7.24/63/66/27  EKG NSR, rate 121  CXR showing arterial deficiency in upper lung fields, and flattening of diaphragms c/w COPD/emphysema  Given Duonebs x 2, Solumedrol 125 mg, put on BiPAP in ED  ICU consulted, pt to be admitted to the ICU for further respiratory management   (25 May 2022 11:38)      PAST MEDICAL & SURGICAL HISTORY:  Diabetes Mellitus, Type II      CAD (Coronary Artery Disease)  s/p 3v CABG ; stents placed [place]       Dyslipidemia      Osteomyelitis      COPD (chronic obstructive pulmonary disease)  on 2L at home and BiPAP at night; intubated       Hypertension      PVD (peripheral vascular disease)      History of PAT (paroxysmal atrial tachycardia)      CABG (Coronary Artery Bypass Graft)        Compound fracture  left leg      S/P primary angioplasty with coronary stent                ECHO   FINDINGS:Technically difficult and limited study  The left ventricular endocardium is not well-visualized. Overall normal   systolic function with an estimated LVEF of 55-60%. Cannot evaluate for   segmental abnormalities  The right ventricle is not well-visualized  Sclerotic trileaflet aortic valve, without AI.  Trace physiologic MR  No significant pericardial effusion.      MEDICATIONS  (STANDING):  albuterol/ipratropium for Nebulization 3 milliLiter(s) Nebulizer every 6 hours  apixaban 2.5 milliGRAM(s) Oral every 12 hours  atorvastatin 80 milliGRAM(s) Oral at bedtime  azithromycin   Tablet 250 milliGRAM(s) Oral <User Schedule>  budesonide 160 MICROgram(s)/formoterol 4.5 MICROgram(s) Inhaler 2 Puff(s) Inhalation two times a day  chlorhexidine 2% Cloths 1 Application(s) Topical daily  ciprofloxacin     Tablet 500 milliGRAM(s) Oral every 12 hours  clopidogrel Tablet 75 milliGRAM(s) Oral daily  dextrose 5%. 1000 milliLiter(s) (100 mL/Hr) IV Continuous <Continuous>  dextrose 5%. 1000 milliLiter(s) (50 mL/Hr) IV Continuous <Continuous>  dextrose 50% Injectable 25 Gram(s) IV Push once  dextrose 50% Injectable 12.5 Gram(s) IV Push once  dextrose 50% Injectable 25 Gram(s) IV Push once  glucagon  Injectable 1 milliGRAM(s) IntraMuscular once  insulin glargine Injectable (LANTUS) 8 Unit(s) SubCutaneous every morning  insulin lispro (ADMELOG) corrective regimen sliding scale   SubCutaneous three times a day before meals  insulin lispro (ADMELOG) corrective regimen sliding scale   SubCutaneous at bedtime  metoprolol tartrate 25 milliGRAM(s) Oral every 12 hours  montelukast 10 milliGRAM(s) Oral daily  pantoprazole    Tablet 40 milliGRAM(s) Oral before breakfast  predniSONE   Tablet 40 milliGRAM(s) Oral daily  saccharomyces boulardii 250 milliGRAM(s) Oral two times a day  tamsulosin 0.4 milliGRAM(s) Oral at bedtime    MEDICATIONS  (PRN):  ALBUTerol    0.083% 2.5 milliGRAM(s) Nebulizer every 6 hours PRN Shortness of Breath and/or Wheezing  dextrose Oral Gel 15 Gram(s) Oral once PRN Blood Glucose LESS THAN 70 milliGRAM(s)/deciliter      FAMILY HISTORY:  Family history of diabetes mellitus (Sibling)    Family hx of lung cancer  brother,  age 82, used to smoke with pt      Denies Family history of CAD or early MI      Constitutional: denies fever, chills  HEENT: denies blurry vision  Respiratory: +baseline SOB, +LA, +cough  Cardiovascular: denies CP, palpitations, orthopnea, PND, LE edema  Gastrointestinal: denies nausea, vomiting, abdominal pain  Genitourinary: denies urinary changes  Skin: Denies rashes  Neurologic: denies headache, weakness, dizziness  Hematology/Oncology: denies bleeding  ROS negative except as noted above      SOCIAL HISTORY:    No tobacco, Alcohol or Drug use    Vital Signs Last 24 Hrs  T(C): 36.7 (27 May 2022 05:35), Max: 36.7 (26 May 2022 20:11)  T(F): 98 (27 May 2022 05:35), Max: 98.1 (26 May 2022 20:11)  HR: 180 (27 May 2022 06:12) (84 - 180)  BP: 129/73 (27 May 2022 05:35) (129/73 - 165/78)  BP(mean): 90 (26 May 2022 11:00) (90 - 112)  RR: 18 (27 May 2022 05:35) (17 - 36)  SpO2: 99% (27 May 2022 06:12) (90% - 100%)    Physical Exam:  General: Well developed, well nourished, NAD  HEENT: NCAT, EOMI bl, moist mucous membranes   Neck: Supple, nontender, no jvd noted  Neurology: A&Ox3, nonfocal, sensation intact   Respiratory: decreased breath sounds b/l, diffuse wheezing, increased expiratory time.  CV: Tachycardic, regular rhythm., +S1/S2, no murmurs noted on exam.  Abdominal: Soft, NT, ND +BS, no palpable masses  Extremities: No C/C/E, + peripheral pulses  Heme: No obvious ecchymosis or petechiae   Skin: warm, dry, normal color      ECG: ekg 6am , marked ST abormality    I&O's Detail    26 May 2022 07:01  -  27 May 2022 07:00  --------------------------------------------------------  IN:    Oral Fluid: 240 mL  Total IN: 240 mL    OUT:    Voided (mL): 760 mL  Total OUT: 760 mL    Total NET: -520 mL          LABS:                        14.0   13.55 )-----------( 304      ( 27 May 2022 07:41 )             46.5     05-    x   |  x   |  37<H>  ----------------------------<  164<H>  x    |  26  |  1.60<H>    Ca    9.8      27 May 2022 07:41  Phos  4.3     -  Mg     2.9     -    TPro  x   /  Alb  3.7  /  TBili  x   /  DBili  x   /  AST  19  /  ALT  31  /  AlkPhos  x           PT/INR - ( 26 May 2022 07:27 )   PT: 12.7 sec;   INR: 1.08 ratio             I&O's Summary    26 May 2022 07:01  -  27 May 2022 07:00  --------------------------------------------------------  IN: 240 mL / OUT: 760 mL / NET: -520 mL      BNP  RADIOLOGY & ADDITIONAL STUDIES:

## 2022-05-27 NOTE — PROGRESS NOTE ADULT - PROBLEM SELECTOR PLAN 5
S/p CABG in 2004, stents in 2019  - Continue home Lopressor 25 mg BID with hold parameters  - Continue home statin as atorvastatin via therapeutic interchange  - Pt not on aspirin as per med rec, continue home Plavix 75 mg daily
Pt with hx of b/l LE stents, last in [facility] in 2020  - Continue home statin and Plavix

## 2022-05-27 NOTE — PROGRESS NOTE ADULT - PROBLEM SELECTOR PLAN 9
Continue home rosuvastatin 20 mg as atorvastatin 80 mg daily while inpatient
Continue home rosuvastatin 20 mg as atorvastatin 80 mg daily while inpatient

## 2022-05-28 ENCOUNTER — TRANSCRIPTION ENCOUNTER (OUTPATIENT)
Age: 82
End: 2022-05-28

## 2022-05-28 VITALS — HEART RATE: 86 BPM | SYSTOLIC BLOOD PRESSURE: 107 MMHG | DIASTOLIC BLOOD PRESSURE: 70 MMHG

## 2022-05-28 DIAGNOSIS — I47.1 SUPRAVENTRICULAR TACHYCARDIA: ICD-10-CM

## 2022-05-28 LAB
ANION GAP SERPL CALC-SCNC: 5 MMOL/L — SIGNIFICANT CHANGE UP (ref 5–17)
BUN SERPL-MCNC: 40 MG/DL — HIGH (ref 7–23)
CALCIUM SERPL-MCNC: 9.8 MG/DL — SIGNIFICANT CHANGE UP (ref 8.5–10.1)
CHLORIDE SERPL-SCNC: 106 MMOL/L — SIGNIFICANT CHANGE UP (ref 96–108)
CO2 SERPL-SCNC: 30 MMOL/L — SIGNIFICANT CHANGE UP (ref 22–31)
CREAT SERPL-MCNC: 1.6 MG/DL — HIGH (ref 0.5–1.3)
EGFR: 42 ML/MIN/1.73M2 — LOW
GLUCOSE SERPL-MCNC: 132 MG/DL — HIGH (ref 70–99)
HCT VFR BLD CALC: 43.3 % — SIGNIFICANT CHANGE UP (ref 39–50)
HGB BLD-MCNC: 13.1 G/DL — SIGNIFICANT CHANGE UP (ref 13–17)
IGE SERPL-ACNC: 118 KU/L — HIGH
MCHC RBC-ENTMCNC: 26.4 PG — LOW (ref 27–34)
MCHC RBC-ENTMCNC: 30.3 GM/DL — LOW (ref 32–36)
MCV RBC AUTO: 87.3 FL — SIGNIFICANT CHANGE UP (ref 80–100)
NRBC # BLD: 0 /100 WBCS — SIGNIFICANT CHANGE UP (ref 0–0)
PLATELET # BLD AUTO: 267 K/UL — SIGNIFICANT CHANGE UP (ref 150–400)
POTASSIUM SERPL-MCNC: 4 MMOL/L — SIGNIFICANT CHANGE UP (ref 3.5–5.3)
POTASSIUM SERPL-SCNC: 4 MMOL/L — SIGNIFICANT CHANGE UP (ref 3.5–5.3)
RBC # BLD: 4.96 M/UL — SIGNIFICANT CHANGE UP (ref 4.2–5.8)
RBC # FLD: 14.3 % — SIGNIFICANT CHANGE UP (ref 10.3–14.5)
SODIUM SERPL-SCNC: 141 MMOL/L — SIGNIFICANT CHANGE UP (ref 135–145)
WBC # BLD: 9.27 K/UL — SIGNIFICANT CHANGE UP (ref 3.8–10.5)
WBC # FLD AUTO: 9.27 K/UL — SIGNIFICANT CHANGE UP (ref 3.8–10.5)

## 2022-05-28 PROCEDURE — 36415 COLL VENOUS BLD VENIPUNCTURE: CPT

## 2022-05-28 PROCEDURE — 0225U NFCT DS DNA&RNA 21 SARSCOV2: CPT

## 2022-05-28 PROCEDURE — 85610 PROTHROMBIN TIME: CPT

## 2022-05-28 PROCEDURE — 99232 SBSQ HOSP IP/OBS MODERATE 35: CPT

## 2022-05-28 PROCEDURE — 82785 ASSAY OF IGE: CPT

## 2022-05-28 PROCEDURE — 82803 BLOOD GASES ANY COMBINATION: CPT

## 2022-05-28 PROCEDURE — 99239 HOSP IP/OBS DSCHRG MGMT >30: CPT

## 2022-05-28 PROCEDURE — 84484 ASSAY OF TROPONIN QUANT: CPT

## 2022-05-28 PROCEDURE — 99291 CRITICAL CARE FIRST HOUR: CPT

## 2022-05-28 PROCEDURE — 93005 ELECTROCARDIOGRAM TRACING: CPT

## 2022-05-28 PROCEDURE — 84100 ASSAY OF PHOSPHORUS: CPT

## 2022-05-28 PROCEDURE — 80048 BASIC METABOLIC PNL TOTAL CA: CPT

## 2022-05-28 PROCEDURE — 84145 PROCALCITONIN (PCT): CPT

## 2022-05-28 PROCEDURE — 94660 CPAP INITIATION&MGMT: CPT

## 2022-05-28 PROCEDURE — 87040 BLOOD CULTURE FOR BACTERIA: CPT

## 2022-05-28 PROCEDURE — 85027 COMPLETE CBC AUTOMATED: CPT

## 2022-05-28 PROCEDURE — 94640 AIRWAY INHALATION TREATMENT: CPT

## 2022-05-28 PROCEDURE — 96374 THER/PROPH/DIAG INJ IV PUSH: CPT

## 2022-05-28 PROCEDURE — 36600 WITHDRAWAL OF ARTERIAL BLOOD: CPT

## 2022-05-28 PROCEDURE — 97161 PT EVAL LOW COMPLEX 20 MIN: CPT

## 2022-05-28 PROCEDURE — 83036 HEMOGLOBIN GLYCOSYLATED A1C: CPT

## 2022-05-28 PROCEDURE — 80053 COMPREHEN METABOLIC PANEL: CPT

## 2022-05-28 PROCEDURE — 71045 X-RAY EXAM CHEST 1 VIEW: CPT

## 2022-05-28 PROCEDURE — 83735 ASSAY OF MAGNESIUM: CPT

## 2022-05-28 PROCEDURE — 82962 GLUCOSE BLOOD TEST: CPT

## 2022-05-28 PROCEDURE — 85025 COMPLETE CBC W/AUTO DIFF WBC: CPT

## 2022-05-28 PROCEDURE — 94760 N-INVAS EAR/PLS OXIMETRY 1: CPT

## 2022-05-28 PROCEDURE — 83605 ASSAY OF LACTIC ACID: CPT

## 2022-05-28 PROCEDURE — 83880 ASSAY OF NATRIURETIC PEPTIDE: CPT

## 2022-05-28 RX ORDER — PANTOPRAZOLE SODIUM 20 MG/1
1 TABLET, DELAYED RELEASE ORAL
Qty: 7 | Refills: 0
Start: 2022-05-28 | End: 2022-06-03

## 2022-05-28 RX ORDER — METFORMIN HYDROCHLORIDE 850 MG/1
1 TABLET ORAL
Qty: 0 | Refills: 0 | DISCHARGE

## 2022-05-28 RX ORDER — APIXABAN 2.5 MG/1
1 TABLET, FILM COATED ORAL
Qty: 0 | Refills: 0 | DISCHARGE
Start: 2022-05-28

## 2022-05-28 RX ORDER — METOPROLOL TARTRATE 50 MG
1 TABLET ORAL
Qty: 60 | Refills: 0
Start: 2022-05-28 | End: 2022-06-26

## 2022-05-28 RX ORDER — METOPROLOL TARTRATE 50 MG
1 TABLET ORAL
Qty: 0 | Refills: 0 | DISCHARGE

## 2022-05-28 RX ORDER — SACCHAROMYCES BOULARDII 250 MG
1 POWDER IN PACKET (EA) ORAL
Qty: 60 | Refills: 0
Start: 2022-05-28 | End: 2022-06-26

## 2022-05-28 RX ORDER — APIXABAN 2.5 MG/1
1 TABLET, FILM COATED ORAL
Qty: 60 | Refills: 0
Start: 2022-05-28 | End: 2022-06-26

## 2022-05-28 RX ADMIN — Medication 500 MILLIGRAM(S): at 17:02

## 2022-05-28 RX ADMIN — Medication 250 MILLIGRAM(S): at 17:02

## 2022-05-28 RX ADMIN — Medication 40 MILLIGRAM(S): at 06:35

## 2022-05-28 RX ADMIN — APIXABAN 2.5 MILLIGRAM(S): 2.5 TABLET, FILM COATED ORAL at 06:34

## 2022-05-28 RX ADMIN — MONTELUKAST 10 MILLIGRAM(S): 4 TABLET, CHEWABLE ORAL at 12:38

## 2022-05-28 RX ADMIN — BUDESONIDE AND FORMOTEROL FUMARATE DIHYDRATE 2 PUFF(S): 160; 4.5 AEROSOL RESPIRATORY (INHALATION) at 06:36

## 2022-05-28 RX ADMIN — PANTOPRAZOLE SODIUM 40 MILLIGRAM(S): 20 TABLET, DELAYED RELEASE ORAL at 06:35

## 2022-05-28 RX ADMIN — Medication 4: at 17:02

## 2022-05-28 RX ADMIN — Medication 250 MILLIGRAM(S): at 06:34

## 2022-05-28 RX ADMIN — CLOPIDOGREL BISULFATE 75 MILLIGRAM(S): 75 TABLET, FILM COATED ORAL at 12:38

## 2022-05-28 RX ADMIN — INSULIN GLARGINE 8 UNIT(S): 100 INJECTION, SOLUTION SUBCUTANEOUS at 07:53

## 2022-05-28 RX ADMIN — Medication 500 MILLIGRAM(S): at 06:35

## 2022-05-28 RX ADMIN — Medication 50 MILLIGRAM(S): at 17:02

## 2022-05-28 RX ADMIN — Medication 50 MILLIGRAM(S): at 06:36

## 2022-05-28 RX ADMIN — Medication 4: at 12:39

## 2022-05-28 RX ADMIN — APIXABAN 2.5 MILLIGRAM(S): 2.5 TABLET, FILM COATED ORAL at 17:02

## 2022-05-28 NOTE — PROGRESS NOTE ADULT - REASON FOR ADMISSION
COPD exacerbation
acute on chronic respiratory failure with hypoxia and hypercarbia due to COPD exacerbation
acute on chronic respiratory failure with hypoxia and hypercarbia due to COPD exacerbation

## 2022-05-28 NOTE — DISCHARGE NOTE PROVIDER - CARE PROVIDER_API CALL
Oscar Marcelino)  Critical Care Medicine; Pulmonary Disease  [address], [facility] [address]  [place], [place] [id number]  Phone: [phone]  Fax: [phone]  Follow Up Time:

## 2022-05-28 NOTE — DISCHARGE NOTE NURSING/CASE MANAGEMENT/SOCIAL WORK - PATIENT PORTAL LINK FT
You can access the FollowMyHealth Patient Portal offered by [facility] by registering at the following website: [email or web address]. By joining [employer]’s FollowMyHealth portal, you will also be able to view your health information using other applications (apps) compatible with our system.

## 2022-05-28 NOTE — PROGRESS NOTE ADULT - SUBJECTIVE AND OBJECTIVE BOX
[facility] -- Saud Aguilar, Génesis Louise, Carroll Bass Savella, [place]  Office # [phone]    Follow Up:  SVT    Subjective/Observations: Seen awake and alert, tolerating RA.  States, he is ready to go home.  Denies any form of respiratory or cardiac discomfort.  Admits to have ambulated in the hallway on RA with no LA or palpitations.  No tele events overnight    REVIEW OF SYSTEMS: All other review of systems is negative unless indicated above  PAST MEDICAL & SURGICAL HISTORY:  Diabetes Mellitus, Type II    CAD (Coronary Artery Disease)  s/p 3v CABG 2004; stents placed in [place] in 2019    Dyslipidemia    Osteomyelitis    COPD (chronic obstructive pulmonary disease)  on 2L at home and BiPAP at night; intubated 6/18    Hypertension    PVD (peripheral vascular disease)    History of PAT (paroxysmal atrial tachycardia)    CABG (Coronary Artery Bypass Graft)  2004    Compound fracture  left leg    S/P primary angioplasty with coronary stent    MEDICATIONS  (STANDING):  apixaban 2.5 milliGRAM(s) Oral every 12 hours  atorvastatin 80 milliGRAM(s) Oral at bedtime  azithromycin   Tablet 250 milliGRAM(s) Oral <User Schedule>  budesonide 160 MICROgram(s)/formoterol 4.5 MICROgram(s) Inhaler 2 Puff(s) Inhalation two times a day  chlorhexidine 2% Cloths 1 Application(s) Topical daily  ciprofloxacin     Tablet 500 milliGRAM(s) Oral every 12 hours  clopidogrel Tablet 75 milliGRAM(s) Oral daily  dextrose 5%. 1000 milliLiter(s) (50 mL/Hr) IV Continuous <Continuous>  dextrose 5%. 1000 milliLiter(s) (100 mL/Hr) IV Continuous <Continuous>  dextrose 50% Injectable 25 Gram(s) IV Push once  dextrose 50% Injectable 12.5 Gram(s) IV Push once  dextrose 50% Injectable 25 Gram(s) IV Push once  glucagon  Injectable 1 milliGRAM(s) IntraMuscular once  insulin glargine Injectable (LANTUS) 8 Unit(s) SubCutaneous every morning  insulin lispro (ADMELOG) corrective regimen sliding scale   SubCutaneous three times a day before meals  insulin lispro (ADMELOG) corrective regimen sliding scale   SubCutaneous at bedtime  metoprolol tartrate 50 milliGRAM(s) Oral every 12 hours  montelukast 10 milliGRAM(s) Oral daily  pantoprazole    Tablet 40 milliGRAM(s) Oral before breakfast  predniSONE   Tablet 40 milliGRAM(s) Oral daily  saccharomyces boulardii 250 milliGRAM(s) Oral two times a day  tamsulosin 0.4 milliGRAM(s) Oral at bedtime    MEDICATIONS  (PRN):  ALBUTerol    0.083% 2.5 milliGRAM(s) Nebulizer every 6 hours PRN Shortness of Breath and/or Wheezing  dextrose Oral Gel 15 Gram(s) Oral once PRN Blood Glucose LESS THAN 70 milliGRAM(s)/deciliter    Allergies    No Known Allergies    Intolerances    shellfish (Nausea)    Vital Signs Last 24 Hrs  T(C): 36.3 (28 May 2022 05:05), Max: 36.7 (27 May 2022 10:43)  T(F): 97.4 (28 May 2022 05:05), Max: 98.1 (27 May 2022 20:33)  HR: 88 (28 May 2022 05:05) (88 - 115)  BP: 102/67 (28 May 2022 05:05) (102/67 - 132/73)  BP(mean): --  RR: 19 (28 May 2022 05:05) (18 - 19)  SpO2: 93% (28 May 2022 05:05) (93% - 98%)  I&O's Summary    27 May 2022 07:01  -  28 May 2022 07:00  --------------------------------------------------------  IN: 0 mL / OUT: 800 mL / NET: -800 mL     PHYSICAL EXAM:  TELE: NSR  Constitutional: NAD, awake and alert, obese  HEENT: Moist Mucous Membranes, Anicteric  Pulmonary: Non-labored, breath sounds are clear but very diminished bilaterally, No wheezing, rales or rhonchi  Cardiovascular: Regular, S1 and S2, No murmurs, rubs, gallops or clicks  Gastrointestinal: Bowel Sounds present, soft, nontender.   Lymph: No peripheral edema. No lymphadenopathy.  Skin: No visible rashes or ulcers.  Psych:  Mood & affect appropriate  LABS: All Labs Reviewed:                        13.1   9.27  )-----------( 267      ( 28 May 2022 07:30 )             43.3                         14.0   13.55 )-----------( 304      ( 27 May 2022 07:41 )             46.5                         13.3   10.67 )-----------( 309      ( 26 May 2022 07:27 )             45.4     28 May 2022 07:30    141    |  106    |  40     ----------------------------<  132    4.0     |  30     |  1.60   27 May 2022 07:41    141    |  104    |  37     ----------------------------<  164    4.3     |  26     |  1.60   26 May 2022 07:27    145    |  107    |  34     ----------------------------<  171    4.5     |  32     |  1.70     Ca    9.8        28 May 2022 07:30  Ca    9.8        27 May 2022 07:41  Ca    10.2       26 May 2022 07:27  Phos  2.8       27 May 2022 07:41  Phos  4.3       26 May 2022 07:27  Mg     2.2       27 May 2022 07:41  Mg     2.9       26 May 2022 07:27    TPro  7.5    /  Alb  3.7    /  TBili  0.4    /  DBili  x      /  AST  19     /  ALT  31     /  AlkPhos  94     27 May 2022 07:41  TPro  7.3    /  Alb  3.8    /  TBili  0.3    /  DBili  x      /  AST  17     /  ALT  31     /  AlkPhos  88     26 May 2022 07:27       EXAM:  ECHO TTE WO CON COMP W DOPP         PROCEDURE DATE:  12/20/2021        INTERPRETATION:  INDICATION: Ischemic heart disease  sonographer KL    Blood Pressure 123/70    Height 180.3 cm     Weight 97.5 kg       BSA 2.2   sq m    Dimensions:  LA 3.0       Normal Values: 2.0 - 4.0 cm  Ao 3.5        Normal Values: 2.0 - 3.8 cm  SEPTUM 1.3       Normal Values: 0.6 - 1.2 cm  PWT 1.0       Normal Values: 0.6 - 1.1 cm  LVIDd 4.3         Normal Values: 3.0 - 5.6 cm  LVIDs 1.8         Normal Values: 1.8 - 4.0 cm    OBSERVATIONS:  Technically difficult and limited study  Mitral Valve: normal, trace physiologic MR.  Aortic Valve/Aorta: Sclerotic trileaflet aortic valve with normal opening.  Tricuspid Valve: Not well-visualized  Pulmonic Valve: Not well-visualized  Left Atrium: normal  Right Atrium: Not well-visualized  Left Ventricle: The left ventricular endocardium is not well-visualized.   Overall normal systolic function with an estimated LVEF of 55%. Cannot   evaluate for segmental abnormalities  Right Ventricle: Not well-visualized  Pericardium: no significant pericardial effusion.  Pulmonary/RV Pressure: estimated PA systolic pressure of 28 mmHg  LV diastolic dysfunction is present    IMPRESSION:  Technically difficult and limited study  The left ventricular endocardium is not well-visualized. Overall normal   systolic function with an estimated LVEF of 55-60%. Cannot evaluate for   segmental abnormalities  The right ventricle is not well-visualized  Sclerotic trileaflet aortic valve, without AI.  Trace physiologic MR  No significant pericardial effusion.    --- End of Report ---      ARISTIDES LEE MD; Attending Cardiologist  This document has been electronically signed. Dec 21 2021  1:38PM    ACC: [id number] EXAM:  XR CHEST PORTABLE IMMED 1V                          PROCEDURE DATE:  05/25/2022      INTERPRETATION:  INDICATION: Shortness of Breath    PRIORS: 12/17/2021    VIEWS: Portable AP radiography of the chest performed.    FINDINGS: Heart size appears within normal limits. Status post   sternotomy. Arterial deficiency noted within the upper lung fields and   flattening of the diaphragm suggest COPD/emphysema. There is a suggestion   for nodular opacities noted overlying the upper lung fields on this   limited portable radiograph, indeterminate. Follow-up PA and lateral   radiography of the chest recommended. No evidence for lobar   consolidation. No pleural effusion. No pneumothorax. No mediastinal   shift. No acute osseous fractures.    IMPRESSION: Arterial deficiency noted within the upper lung fields and   flattening of the diaphragm suggest COPD/emphysema. There is a suggestion   for nodular opacities noted overlying the upper lung fields on this   limited portable radiograph, indeterminate. Follow-up PA and lateral   radiography of the chest recommended.    --- End of Report ---    ELEAZAR CASTAÑEDA MD; Attending Radiologist  This document has been electronically signed. May 25 2022  8:57AM    Ventricular Rate 188 BPM    Atrial Rate 187 BPM    QRS Duration 82 ms    Q-T Interval 236 ms    QTC Calculation(Bazett) 417 ms    R Axis 85 degrees    T Axis 56 degrees    Diagnosis Line Supraventricular tachycardia  Septal infarct , age undetermined  Marked ST abnormality, possible inferior subendocardial injury  Confirmed by MICHAEL BASS ([id number]) on 5/27/2022 11:33:48 AM

## 2022-05-28 NOTE — PROGRESS NOTE ADULT - SUBJECTIVE AND OBJECTIVE BOX
Date/Time Patient Seen:  		  Referring MD:   Data Reviewed	       Patient is a 82y old  Male who presents with a chief complaint of acute on chronic respiratory failure with hypoxia and hypercarbia due to COPD exacerbation (27 May 2022 17:03)      Subjective/HPI     PAST MEDICAL & SURGICAL HISTORY:  Diabetes Mellitus, Type II    CAD (Coronary Artery Disease)  s/p 3v CABG 2004; stents placed in [place] in 2019    Dyslipidemia    Asymptomatic Peripheral Vascular Disease    Osteomyelitis    COPD (chronic obstructive pulmonary disease)  on 2L at home and BiPAP at night; intubated 6/18    Diabetes mellitus    Hypertension    PVD (peripheral vascular disease)    History of PAT (paroxysmal atrial tachycardia)    CABG (Coronary Artery Bypass Graft)  2004    Compound fracture  left leg    S/P primary angioplasty with coronary stent          Medication list         MEDICATIONS  (STANDING):  apixaban 2.5 milliGRAM(s) Oral every 12 hours  atorvastatin 80 milliGRAM(s) Oral at bedtime  azithromycin   Tablet 250 milliGRAM(s) Oral <User Schedule>  budesonide 160 MICROgram(s)/formoterol 4.5 MICROgram(s) Inhaler 2 Puff(s) Inhalation two times a day  chlorhexidine 2% Cloths 1 Application(s) Topical daily  ciprofloxacin     Tablet 500 milliGRAM(s) Oral every 12 hours  clopidogrel Tablet 75 milliGRAM(s) Oral daily  dextrose 5%. 1000 milliLiter(s) (50 mL/Hr) IV Continuous <Continuous>  dextrose 5%. 1000 milliLiter(s) (100 mL/Hr) IV Continuous <Continuous>  dextrose 50% Injectable 25 Gram(s) IV Push once  dextrose 50% Injectable 12.5 Gram(s) IV Push once  dextrose 50% Injectable 25 Gram(s) IV Push once  glucagon  Injectable 1 milliGRAM(s) IntraMuscular once  insulin glargine Injectable (LANTUS) 8 Unit(s) SubCutaneous every morning  insulin lispro (ADMELOG) corrective regimen sliding scale   SubCutaneous three times a day before meals  insulin lispro (ADMELOG) corrective regimen sliding scale   SubCutaneous at bedtime  metoprolol tartrate 50 milliGRAM(s) Oral every 12 hours  montelukast 10 milliGRAM(s) Oral daily  pantoprazole    Tablet 40 milliGRAM(s) Oral before breakfast  predniSONE   Tablet 40 milliGRAM(s) Oral daily  saccharomyces boulardii 250 milliGRAM(s) Oral two times a day  tamsulosin 0.4 milliGRAM(s) Oral at bedtime    MEDICATIONS  (PRN):  ALBUTerol    0.083% 2.5 milliGRAM(s) Nebulizer every 6 hours PRN Shortness of Breath and/or Wheezing  dextrose Oral Gel 15 Gram(s) Oral once PRN Blood Glucose LESS THAN 70 milliGRAM(s)/deciliter         Vitals log        ICU Vital Signs Last 24 Hrs  T(C): 36.3 (28 May 2022 05:05), Max: 36.7 (27 May 2022 10:43)  T(F): 97.4 (28 May 2022 05:05), Max: 98.1 (27 May 2022 20:33)  HR: 88 (28 May 2022 05:05) (88 - 115)  BP: 102/67 (28 May 2022 05:05) (102/67 - 132/73)  BP(mean): --  ABP: --  ABP(mean): --  RR: 19 (28 May 2022 05:05) (18 - 19)  SpO2: 93% (28 May 2022 05:05) (93% - 98%)           Input and Output:  I&O's Detail    27 May 2022 07:01  -  28 May 2022 07:00  --------------------------------------------------------  IN:  Total IN: 0 mL    OUT:    Voided (mL): 800 mL  Total OUT: 800 mL    Total NET: -800 mL          Lab Data                        14.0   13.55 )-----------( 304      ( 27 May 2022 07:41 )             46.5     05-27    141  |  104  |  37<H>  ----------------------------<  164<H>  4.3   |  26  |  1.60<H>    Ca    9.8      27 May 2022 07:41  Phos  2.8     05-27  Mg     2.2     05-27    TPro  7.5  /  Alb  3.7  /  TBili  0.4  /  DBili  x   /  AST  19  /  ALT  31  /  AlkPhos  94  05-27            Review of Systems	      Objective     Physical Examination    heart s1s2  lung dc BS      Pertinent Lab findings & Imaging      Eliecer:  NO   Adequate UO     I&O's Detail    27 May 2022 07:01  -  28 May 2022 07:00  --------------------------------------------------------  IN:  Total IN: 0 mL    OUT:    Voided (mL): 800 mL  Total OUT: 800 mL    Total NET: -800 mL               Discussed with:     Cultures:	        Radiology

## 2022-05-28 NOTE — DISCHARGE NOTE PROVIDER - NSDCFUSCHEDAPPT_GEN_ALL_CORE_FT
Oscar Marcelino  [facility]  PULMMED [address]  Scheduled Appointment: 06/02/2022    [facility]  WOUNDCARE  [address]  Scheduled Appointment: 06/14/2022    [facility]  Surg Vasc [address]  Scheduled Appointment: 07/11/2022    Morro Buckley  [facility]  Surg Vasc [address]  Scheduled Appointment: 07/11/2022     [facility]  WOUNDCARE  [address]  Scheduled Appointment: 07/26/2022    [facility]  VASCULAR [address]  Scheduled Appointment: 10/17/2022    Morro Buckley  [facility]  VASCULAR [address]  Scheduled Appointment: 10/17/2022

## 2022-05-28 NOTE — DISCHARGE NOTE PROVIDER - NSDCMRMEDTOKEN_GEN_ALL_CORE_FT
apixaban 2.5 mg oral tablet: 1 tab(s) orally every 12 hours  azithromycin 250 mg oral tablet: 1 tab(s) orally Monday, Wednesday, and Friday  Breo Ellipta 200 mcg-25 mcg/inh inhalation powder: 1 puff(s) inhaled once a day  Cipro 500 mg oral tablet: 1 tab(s) orally every 12 hours  clopidogrel 75 mg oral tablet: 1 tab(s) orally once a day  CoQ10 100 mg oral capsule: 1 cap(s) orally once a day  Fish Oil oral capsule: 1 cap(s) orally once a day  Incruse Ellipta 62.5 mcg/inh inhalation powder: 1 puff(s) inhaled every 24 hours  Jardiance 25 mg oral tablet: 1 tab(s) orally once a day (in the morning)  metoprolol tartrate 50 mg oral tablet: 1 tab(s) orally every 12 hours  montelukast 10 mg oral tablet: 1 tab(s) orally once a day  Nucala 100 mg subcutaneous injection: 100 milligram(s) subcutaneous every 4 weeks    *Next dose due 6/3/22*  pantoprazole 40 mg oral delayed release tablet: 1 tab(s) orally once a day (before a meal) for 1 week  predniSONE 1 mg oral tablet: 2 tab(s) orally once a day starting on 6/7/22  predniSONE 10 mg oral tablet: 3 tabs (30mg total) orally once a day for 3 days 5/29-5/31,  then 2 tabs (20mg total) orally once a day for 3 days 6/1-6/3,  then 1 tab (10mg total) orally once a day for 3 days, 6/4-6/6,   then go back to your chronic home dose of 2mg once a day  rosuvastatin 20 mg oral tablet: 1 tab(s) orally once a day (at bedtime)  saccharomyces boulardii lyo 250 mg oral capsule: 1 cap(s) orally 2 times a day  tamsulosin 0.4 mg oral capsule: 1 cap(s) orally once a day (at bedtime)  Turmeric 500 mg oral capsule: 1 cap(s) orally once a day  Vitamin B Complex 100: 1 tab(s) orally once a day  Vitamin B12: 1 tab(s) orally once a day  Vitamin C: 1 tab(s) orally once a day  Vitamin D3: 1 tab(s) orally once a day

## 2022-05-28 NOTE — DISCHARGE NOTE PROVIDER - NSDCCPCAREPLAN_GEN_ALL_CORE_FT
PRINCIPAL DISCHARGE DIAGNOSIS  Diagnosis: Acute on chronic respiratory failure with hypoxia and hypercapnia  Assessment and Plan of Treatment: Your respiratory failure was due to a COPD exacerbation. You were treated in the ICU with steroids, BiPAP, nebulizer treatments and have improved. Take the prednisone taper as prescribed:  3 tabs (30mg total) orally once a day for 3 days 5/29-5/31,  then 2 tabs (20mg total) orally once a day for 3 days 6/1-6/3,  then 1 tab (10mg total) orally once a day for 3 days, 6/4-6/6,   then go back to your chronic home dose of 2mg once a day starting on 6/7/22.   Take the prescribed pantoprazole 40mg once a day for a week to decrease chance of stomach irritation from the high dose prednisone.   Continue your home regimen of inhalers. Follow up with your pulmonologist, Dr. Marcelino in a week.  If you have shortness of breath, confusion, lethargy, or other symptoms that concern you, call your doctor immediately or return to the ER.      SECONDARY DISCHARGE DIAGNOSES  Diagnosis: Sustained SVT  Assessment and Plan of Treatment: You had an SVT with your heart rate up to the 180s that lasted about 30minutes and resolved with intravenous metoprolol. A cardiologist evaluated you and increased your oral metoprolol dose to 50mg twice a day.  Please start taking the INCREASED dose of metoprolol 50mg twice a day as prescribed in place of the 25mg dose you had at home.   Your kidney function is at the point where it is recommended that you take a lower dose of Eliquis (apixaban).   Please start taking the DECREASED dose of Eliquis 2.5mg twice a day as prescribed, in place of the 5mg twice a day dose that you had at home.   Follow up with your cardiologist within a week.   Monitor your stool while on blood thinners. If you have black or bloody stool, call your doctor immediately or return to the ER.   Do NOT take NSAIDs (Advil, Aleve, Motrin, ibuprofen, naproxen, etc). You may safely use Tylenol per the instructions on the bottle for any aches and pains.    Diagnosis: Stage 3 chronic kidney disease  Assessment and Plan of Treatment: You have chronic kidney disease and your creatinine is currently ~1.6. This is similar to a value you had on bloodwork in March, 2022. Follow up with your PMD and/or a nephrologist to monitor.  Do NOT take NSAIDs (Advil, Aleve, Motrin, ibuprofen, naproxen, etc). You may safely use Tylenol per the instructions on the bottle for any aches and pains.  Stop taking metformin and losartan. Discuss alternatives with your PMD and cardiologist.    Diagnosis: Chronic osteomyelitis  Assessment and Plan of Treatment: Continue your home regimen of antibiotics and follow up with your infectious disease doctor. Start taking a probiotic like the prescribed saccharomyces to decrease the negative side effects antibiotics can have on your colon.    Diagnosis: HTN (hypertension)  Assessment and Plan of Treatment: STOP taking losartan.   Please start taking the INCREASED dose of metoprolol 50mg twice a day as prescribed in place of the 25mg dose you had at home.       Diagnosis: DM (diabetes mellitus)  Assessment and Plan of Treatment: STOP taking metformin as this can be dangerous given your decreased kidney function.  Continue taking your Jardiance. Follow up with your PMD and/or endocrinologist and discuss any alternative medication to take the place of metformin if it is needed.  Monitor your glucose. Be aware that your glucose levels will be much higher while you are on the high dose prednisone for this next week but should improved when you go back to your home dose of prednisone in 10 days.

## 2022-05-28 NOTE — DISCHARGE NOTE NURSING/CASE MANAGEMENT/SOCIAL WORK - NSDCVIVACCINE_GEN_ALL_CORE_FT
influenza, injectable, quadrivalent, preservative free; 18-Oct-2019 13:33; Faiza Mclaughlin (RN); [employer]; [id number] (Exp. Date: 30-Jun-2020); IntraMuscular; Deltoid Left.; 0.5 milliLiter(s); VIS (VIS Published: 15-Aug-2019, VIS Presented: 18-Oct-2019);   influenza, injectable, quadrivalent, preservative free; 26-Sep-2020 15:14; Cory Crooks (RN); [employer]; [id number] (Exp. Date: 30-Jun-2021); IntraMuscular; Deltoid Left.; 0.5 milliLiter(s); VIS (VIS Published: 15-Aug-2019, VIS Presented: 26-Sep-2020);   influenza, injectable, quadrivalent, preservative free; 06-Sep-2021 18:22; Aaron Zavala (RACHEL); [employer]; [id number] (Exp. Date: 30-Jun-2022); IntraMuscular; Deltoid Left.; 0.5 milliLiter(s); VIS (VIS Published: 15-Aug-2019, VIS Presented: 06-Sep-2021);

## 2022-05-28 NOTE — DISCHARGE NOTE PROVIDER - HOSPITAL COURSE
HPI as documented in H&P:   HPI as documented in H&P:  This is an 81 y/o M with a PMHx of COPD (on home O2 prn and nocturnal BIPAP), CAD (s/p 3v CABG 2004, stents in 2019), HLD, T2DM, HTN, PAT (on Eliquis), PVD (s/p stenting in b/l legs), L femur chronic osteomyelitis (s/p recent hospitalization for intramedullary abscess drainage, now on chronic ciprofloxacin), presenting with 1 day of increasing respiratory distress. At home, pt started feeling short of breath last night, with worsening symptoms into this morning. SpO2 was in the low 80s despite increasing home O2 to 6 L. Pt was brought in by EMS on CPAP, with some symptomatic improvement. Pt currently feels a little uncomfortable with the BiPAP mask on, but otherwise notes improvement in his breathing. As per wife, the only symptoms he had prior to the onset of his respiratory distress were fatigue and rhinorrhea. Of note, pt has environmental allergies and at home, his room air conditioner was turned on for the first time since the fall. Pt denies any fevers, chills, chest pain, palpitations, abd pain, constipation, diarrhea, dysuria. Pt has no other complaints or concerns at this time.    ED course:  VS T 95.7 F, , /94, RR 26, SpO2 96% on BiPAP  Labs significant for WBC 17.28, HCO3 29, BUN/Cr 30/1.7, ABG 7.23/65/95/27 --> 7.24/63/66/27  EKG NSR, rate 121  CXR showing arterial deficiency in upper lung fields, and flattening of diaphragms c/w COPD/emphysema  Given Duonebs x 2, Solumedrol 125 mg, put on BiPAP in ED  ICU consulted, pt to be admitted to the ICU for further respiratory management      Hospital Course:  Pt admitted to the ICU with acute on chronic hypoxic and hypercapnic respiratory failure 2/2 COPD exacerbation. Pt's pCO2 was up to 75 with pH of 7.22. Pt was treated with BiPAP and improved. Pt was treated with solu-medrol then transitioned to prednisone and nebs. Pulm ([facility]) consulted on the case. Pt's course c/b SVT. Rapid response was called on 5/27 as pt went into SVT with HR in the 180s that sustained for ~30minutes. SVT resolved after lopressor 5mg IVx1, Valsalva maneuver and carotid massage. Cardio ([facility]) consulted on the case and pt continued on po metoprolol. No further SVT occurred during hospitalization.   Pt's respiratory status improved greatly and he felt back to baseline. Phys therapy rec home with home care and home Pt felt well and was discharge to home with close outpatient  f/up.    On day of discharge:  ROS: HPI as documented in H&P:  This is an 83 y/o M with a PMHx of COPD (on home O2 prn and nocturnal BIPAP), CAD (s/p 3v CABG 2004, stents in 2019), HLD, T2DM, HTN, PAT (on Eliquis), PVD (s/p stenting in b/l legs), L femur chronic osteomyelitis (s/p recent hospitalization for intramedullary abscess drainage, now on chronic ciprofloxacin), presenting with 1 day of increasing respiratory distress. At home, pt started feeling short of breath last night, with worsening symptoms into this morning. SpO2 was in the low 80s despite increasing home O2 to 6 L. Pt was brought in by EMS on CPAP, with some symptomatic improvement. Pt currently feels a little uncomfortable with the BiPAP mask on, but otherwise notes improvement in his breathing. As per wife, the only symptoms he had prior to the onset of his respiratory distress were fatigue and rhinorrhea. Of note, pt has environmental allergies and at home, his room air conditioner was turned on for the first time since the fall. Pt denies any fevers, chills, chest pain, palpitations, abd pain, constipation, diarrhea, dysuria. Pt has no other complaints or concerns at this time.    ED course:  VS T 95.7 F, , /94, RR 26, SpO2 96% on BiPAP  Labs significant for WBC 17.28, HCO3 29, BUN/Cr 30/1.7, ABG 7.23/65/95/27 --> 7.24/63/66/27  EKG NSR, rate 121  CXR showing arterial deficiency in upper lung fields, and flattening of diaphragms c/w COPD/emphysema  Given Duonebs x 2, Solumedrol 125 mg, put on BiPAP in ED  ICU consulted, pt to be admitted to the ICU for further respiratory management      Hospital Course:  Pt admitted to the ICU with acute on chronic hypoxic and hypercapnic respiratory failure 2/2 COPD exacerbation. Pt's pCO2 was up to 75 with pH of 7.22. Pt was treated with BiPAP and improved. Pt was treated with solu-medrol then transitioned to prednisone and nebs. Pulm ([facility]) consulted on the case. Pt's course c/b SVT. Rapid response was called on 5/27 as pt went into SVT with HR in the 180s that sustained for ~30minutes. SVT resolved after lopressor 5mg IVx1, Valsalva maneuver and carotid massage. Cardio ([facility]) consulted on the case and pt continued on po metoprolol. No further SVT occurred during hospitalization.   Pt's respiratory status improved greatly and he felt back to baseline. Phys therapy rec home with home care and home Pt felt well and was discharged to home with close outpatient f/up.    On day of discharge:  ROS: Pt denies SOB, cough, fever, chills, CP.    Vital Signs Last 24 Hrs  T(C): 36.3 (28 May 2022 05:05), Max: 36.7 (27 May 2022 10:43)  T(F): 97.4 (28 May 2022 05:05), Max: 98.1 (27 May 2022 20:33)  HR: 88 (28 May 2022 05:05) (88 - 115)  BP: 102/67 (28 May 2022 05:05) (102/67 - 132/73)  BP(mean): --  RR: 19 (28 May 2022 05:05) (18 - 19)  SpO2: 93% (28 May 2022 05:05) (93% - 98%)  I&O's Summary    PHYSICAL EXAM:  GENERAL: NAD at rest  HEENT:  anicteric, moist mucous membranes  CHEST/LUNG:  decreased breath sounds b/l, scattered wheezes b/l  HEART:  RRR, S1, S2  ABDOMEN:  BS+, soft, nontender, nondistended  EXTREMITIES: no cyanosis or calf tenderness  NERVOUS SYSTEM: answers questions and follows commands appropriately    Time spent: 40min

## 2022-05-28 NOTE — PROGRESS NOTE ADULT - PROVIDER SPECIALTY LIST ADULT
Cardiology
Critical Care
Critical Care
Infectious Disease
Infectious Disease
Pulmonology
Hospitalist
Hospitalist

## 2022-05-28 NOTE — DISCHARGE NOTE NURSING/CASE MANAGEMENT/SOCIAL WORK - NSDCPEFALRISK_GEN_ALL_CORE
For information on Fall & Injury Prevention, visit: [email or web address] OR  [email or web address] OR  https://www.cdc.gov/steadi/patient.html

## 2022-05-28 NOTE — PROGRESS NOTE ADULT - NS ATTEND AMEND GEN_ALL_CORE FT
Pt seen and examined, agree with above    SVT now resolved  c/w tele  on home Eliquis - continue for now  c/w BB

## 2022-05-28 NOTE — PROGRESS NOTE ADULT - ASSESSMENT
81 y/o male with pmhx of eosinophilic asthma/copd, former smoker, chornic respiratory failure on home o2 and nocturnal bipap, cardiac arrest in 2018 due to respiratory failure, HTN, HLD, DM II, CAD s/p triple cabg in 2004 and PCI in 2019, PVD s/p b/l stents on plavix, afib on eliquis, bph, left femur fracture with intramedullary abscess s/;p drainage in 2021 admitted with acute copd to ICU, transferred out 5/26.     - SVT resolved after IVP lopressor 5 mg, carotid massage, and valsalva maneuver. due for PO lopressor now. on eliquis  - check bmp, mag, phos, troponin. keep K > 4, Mg > 2
81 y/o M with a PMHx of COPD (on home O2 prn and nocturnal BIPAP), CAD (s/p 3v CABG 2004, stents in 2019), HLD, T2DM, HTN, PAT (on Eliquis), PVD (s/p stenting in b/l legs), L femur chronic osteomyelitis (s/p recent hospitalization for intramedullary abscess drainage, now on chronic ciprofloxacin),     prednisone tapered  VS noted  cardio eval noted  proBNP low normal  on NIPPV overnight    copd - asthma overlap - follows with Dr Oscar Marcelino - transition off NEBS and on to Inhaler rx regimen - complete short course of Prednisone, ABG noted, improvement noted -   wean o2 as tolerated - keep sat > 88 pct  pt is on Nucala as outpatient for 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
83 y/o M with a PMHx of COPD (on home O2 prn and nocturnal BIPAP), CAD (s/p 3v CABG 2004, stents in 2019), HLD, T2DM, HTN, PAT? vs Afib (on Eliquis), PVD (s/p stenting in b/l legs) cardiology consulted for overnight SVT.    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   - Remained NSR overnight with no further SVT.    - Patient w/ known history of PAT? on Eliquis, continue for now.  He follows with Dr. Chong  - Continue Lopressor 50 mg q12H  - On Plavix for PAD  - Monitor electrolytes, replete to keep K>4 and Mag>2  - No meaningful evidence of volume overload.  No O2 requirement  - Previous TTE 12/21 showed overall normal systolic function with an estimated LVEF of 55-60%. No need to repeat at this time.  Can be done as outpatient   - BP well controlled, monitor routine hemodynamics. Can continue to hold home losartan for now in the setting of MERRILL.    - COPD management per Pulmonology.    - Will continue to follow.    Dena Maynard DNP, NP-C  Cardiology   Spectra #[phone]  
81 y/o M with a PMHx of COPD (on home O2 PRN and nocturnal BIPAP, on chronic azithromycin and low-dose prednisone), CAD (s/p 3V CABG 2004, stents in 2019), HLD, T2DM, HTN, PAT (on Eliquis), PVD (s/p stenting in b/l legs), L femur chronic osteomyelitis (s/p recent hospitalization for intramedullary abscess drainage, now on chronic ciprofloxacin), presenting with 1 day of increasing SOB, respiratory distress and lethargy admitted with acute on chronic hypoxic and hypercapnic respiratory failure 2/2 COPD exacerbation.
This is an 83 y/o M with a PMHx of COPD (on home O2 prn and nocturnal BIPAP), CAD (s/p 3v CABG 2004, stents in 2019), HLD, T2DM, HTN, PAT (on Eliquis), PVD (s/p stenting in b/l legs), L femur chronic osteomyelitis (s/p recent hospitalization for intramedullary abscess drainage, now on chronic ciprofloxacin), presenting with 1 day of increasing respiratory distress, admitted for acute on chronic hypercapnic respiratory failure 2/2 COPD exacerbation.    COPD Exacerbation     Plan   Neuro:  - no active issues; aox3     Pul   - saturating well on RA   - continue nocturnal bipap  - d/c iv solmedrol; will start prednisone 40mg x 3days   - continue prn albuterol, Duoneb, Budesonide, montelukast     Cardio  h/o CABG PVD and PAT on Eliquis as well as HTN   - continue renally dose Eliquis, Lopressor and Plavix     Heme   - continue Eliquis 2.5mg q12; Plavix 75mg daily     Renal   - renal function at baseline   - avoid nephrotoxic medications as able     GI   - regular consistent carb + DASH/TLC diet     Endocrine   -Aggressive glycemic control to limit FS glucose to < 180mg/dl, BS stable.  -SSI    ID   - f/u bcx   - Monitor WBC and fever curve   - Continue chronic azithromycin + ciprofloxacin 
81 y/o M with a PMHx of COPD (on home O2 PRN and nocturnal BIPAP, on chronic azithromycin and low-dose prednisone), CAD (s/p 3V CABG 2004, stents in 2019), HLD, T2DM, HTN, hx of SVT, on Eliquis, PVD (s/p stenting in b/l legs), L femur chronic osteomyelitis (s/p recent hospitalization for intramedullary abscess drainage, now on chronic ciprofloxacin), presenting with 1 day of increasing SOB, respiratory distress and lethargy admitted with acute on chronic hypoxic and hypercapnic respiratory failure 2/2 COPD exacerbation, course c/b SVT.

## 2022-05-31 NOTE — ED ADULT TRIAGE NOTE - TEMPERATURE IN FAHRENHEIT (DEGREES F)
98.3 Patricio Lundberg  [facility]  [facility] [address]  Scheduled Appointment: 06/10/2022    Patricio Lundberg  [facility] [address]  Scheduled Appointment: 06/13/2022     Patricio Lundberg  [facility]  [facility] [address]  Scheduled Appointment: 06/21/2022    Tarah Jackson  [facility]  [facility] [address]  Scheduled Appointment: 08/05/2022

## 2022-06-01 RX ORDER — ALBUTEROL SULFATE 90 UG/1
108 (90 BASE) INHALANT RESPIRATORY (INHALATION)
Qty: 1 | Refills: 5 | Status: ACTIVE | COMMUNITY
Start: 2020-03-05 | End: 1900-01-01

## 2022-06-02 ENCOUNTER — APPOINTMENT (OUTPATIENT)
Dept: PULMONOLOGY | Facility: CLINIC | Age: 82
End: 2022-06-02

## 2022-06-02 ENCOUNTER — APPOINTMENT (OUTPATIENT)
Dept: PULMONOLOGY | Facility: CLINIC | Age: 82
End: 2022-06-02
Payer: MEDICARE

## 2022-06-02 DIAGNOSIS — R09.89 OTHER SPECIFIED SYMPTOMS AND SIGNS INVOLVING THE CIRCULATORY AND RESPIRATORY SYSTEMS: ICD-10-CM

## 2022-06-02 PROCEDURE — 99214 OFFICE O/P EST MOD 30 MIN: CPT | Mod: 95

## 2022-06-02 RX ORDER — APIXABAN 2.5 MG/1
2.5 TABLET, FILM COATED ORAL
Refills: 0 | Status: ACTIVE | COMMUNITY

## 2022-06-02 RX ORDER — APIXABAN 5 MG/1
5 TABLET, FILM COATED ORAL
Refills: 0 | Status: COMPLETED | COMMUNITY
End: 2022-06-02

## 2022-06-02 RX ORDER — METFORMIN HYDROCHLORIDE 1000 MG/1
1000 TABLET, COATED ORAL
Refills: 0 | Status: COMPLETED | COMMUNITY
End: 2022-06-02

## 2022-06-02 RX ORDER — LOSARTAN POTASSIUM 25 MG/1
25 TABLET, FILM COATED ORAL
Refills: 0 | Status: COMPLETED | COMMUNITY
End: 2022-06-02

## 2022-06-07 NOTE — ED PROVIDER NOTE - CPE EDP RESP NORM
Quality 110: Preventive Care And Screening: Influenza Immunization: Influenza Immunization previously received during influenza season
Quality 111:Pneumonia Vaccination Status For Older Adults: Pneumococcal vaccine administered on or after patient’s 60th birthday and before the end of the measurement period
Detail Level: Detailed
Quality 226: Preventive Care And Screening: Tobacco Use: Screening And Cessation Intervention: Patient screened for tobacco use and is an ex/non-smoker
normal...

## 2022-06-14 ENCOUNTER — APPOINTMENT (OUTPATIENT)
Dept: WOUND CARE | Facility: HOSPITAL | Age: 82
End: 2022-06-14
Payer: MEDICARE

## 2022-06-14 ENCOUNTER — OUTPATIENT (OUTPATIENT)
Dept: OUTPATIENT SERVICES | Facility: HOSPITAL | Age: 82
LOS: 1 days | Discharge: HOME | End: 2022-06-14
Payer: COMMERCIAL

## 2022-06-14 VITALS
BODY MASS INDEX: 31.35 KG/M2 | TEMPERATURE: 97.6 F | OXYGEN SATURATION: 94 % | HEART RATE: 87 BPM | SYSTOLIC BLOOD PRESSURE: 117 MMHG | RESPIRATION RATE: 20 BRPM | DIASTOLIC BLOOD PRESSURE: 76 MMHG | WEIGHT: 219 LBS | HEIGHT: 70 IN

## 2022-06-14 DIAGNOSIS — T14.8XXA OTHER INJURY OF UNSPECIFIED BODY REGION, INITIAL ENCOUNTER: Chronic | ICD-10-CM

## 2022-06-14 DIAGNOSIS — S71.101A UNSPECIFIED OPEN WOUND, RIGHT THIGH, INITIAL ENCOUNTER: ICD-10-CM

## 2022-06-14 DIAGNOSIS — Z95.5 PRESENCE OF CORONARY ANGIOPLASTY IMPLANT AND GRAFT: Chronic | ICD-10-CM

## 2022-06-14 PROCEDURE — 99213 OFFICE O/P EST LOW 20 MIN: CPT

## 2022-06-14 PROCEDURE — G0463: CPT

## 2022-06-14 NOTE — PHYSICAL EXAM
[Normal Thyroid] : the thyroid was normal [Normal Breath Sounds] : Normal breath sounds [Normal Heart Sounds] : normal heart sounds [Normal Rate and Rhythm] : normal rate and rhythm [Alert] : alert [Oriented to Person] : oriented to person [Oriented to Place] : oriented to place [Oriented to Time] : oriented to time [Calm] : calm [JVD] : no jugular venous distention  [Abdomen Masses] : No abdominal massess [Abdomen Tenderness] : ~T ~M No abdominal tenderness [Tender] : nontender [Enlarged] : not enlarged [de-identified] : elderly BM, NAD, alert, Ox3. [FreeTextEntry1] : Left Lateral Thigh - RESOLVED  [de-identified] : m

## 2022-06-14 NOTE — ASSESSMENT
[Verbal] : Verbal [Demo] : Demo [Patient] : Patient [Spouse] : Spouse [Good - alert, interested, motivated] : Good - alert, interested, motivated [Verbalizes knowledge/Understanding] : Verbalizes knowledge/understanding [Dressing changes] : dressing changes [Skin Care] : skin care [Pressure relief] : pressure relief [Signs and symptoms of infection] : sign and symptoms of infection [How and When to Call] : how and when to call [Patient responsibility to plan of care] : patient responsibility to plan of care [Stable] : stable [Home] : Home [Ambulatory] : Ambulatory [] : No [FreeTextEntry2] : Maintain Skin Integrity\par Infection Prevention \par Skin Care \par Wound Care (Dressing Changes)\par Antibiotic Regimen \par F/U 2 weeks  [FreeTextEntry3] : wound resolved  [FreeTextEntry4] : MD encouraged patient to continue applying dry protective dressing to wound site. \par MD cleared patient to allow wound site to get wet in showers only, no pools/oceans. Pt verbalized understanding of topics.\par MD instructed patient to continue antibiotic regimen as prescribed.\par F/U with Dr. العلي to be discussed with MD and patient next assessment so patient can get referral for visit. \par F/U 2 weeks.

## 2022-06-14 NOTE — HISTORY OF PRESENT ILLNESS
[FreeTextEntry1] : 83 yo BM, here with his wife for f/u of a chronic  left lateral thigh wound. Pt with known large collection in the left lat thigh and according to the pt's wife, no further surgical intervention is planned with his ortho surg, only continued po abx. Pt seen by ID, Dr. العلي recently  who is planning to keep pt on cipro indefinitely for known osteomyelitis.A recent MRI still showing large 12.9cm fluid and abscess collection within the Left thigh.\par    Today, the small wound opening is closed, but both pt/wife told that there is a possibility that a wound could reopen and to watch for it.\par

## 2022-06-15 DIAGNOSIS — Z98.890 OTHER SPECIFIED POSTPROCEDURAL STATES: ICD-10-CM

## 2022-06-15 DIAGNOSIS — Z79.01 LONG TERM (CURRENT) USE OF ANTICOAGULANTS: ICD-10-CM

## 2022-06-15 DIAGNOSIS — M81.0 AGE-RELATED OSTEOPOROSIS WITHOUT CURRENT PATHOLOGICAL FRACTURE: ICD-10-CM

## 2022-06-15 DIAGNOSIS — I11.0 HYPERTENSIVE HEART DISEASE WITH HEART FAILURE: ICD-10-CM

## 2022-06-15 DIAGNOSIS — J44.9 CHRONIC OBSTRUCTIVE PULMONARY DISEASE, UNSPECIFIED: ICD-10-CM

## 2022-06-15 DIAGNOSIS — E78.00 PURE HYPERCHOLESTEROLEMIA, UNSPECIFIED: ICD-10-CM

## 2022-06-15 DIAGNOSIS — Z95.5 PRESENCE OF CORONARY ANGIOPLASTY IMPLANT AND GRAFT: ICD-10-CM

## 2022-06-15 DIAGNOSIS — E11.622 TYPE 2 DIABETES MELLITUS WITH OTHER SKIN ULCER: ICD-10-CM

## 2022-06-15 DIAGNOSIS — Z79.2 LONG TERM (CURRENT) USE OF ANTIBIOTICS: ICD-10-CM

## 2022-06-15 DIAGNOSIS — E11.51 TYPE 2 DIABETES MELLITUS WITH DIABETIC PERIPHERAL ANGIOPATHY WITHOUT GANGRENE: ICD-10-CM

## 2022-06-15 DIAGNOSIS — L97.112 NON-PRESSURE CHRONIC ULCER OF RIGHT THIGH WITH FAT LAYER EXPOSED: ICD-10-CM

## 2022-06-15 DIAGNOSIS — Z79.02 LONG TERM (CURRENT) USE OF ANTITHROMBOTICS/ANTIPLATELETS: ICD-10-CM

## 2022-06-15 DIAGNOSIS — J45.50 SEVERE PERSISTENT ASTHMA, UNCOMPLICATED: ICD-10-CM

## 2022-06-15 DIAGNOSIS — Z95.1 PRESENCE OF AORTOCORONARY BYPASS GRAFT: ICD-10-CM

## 2022-06-15 DIAGNOSIS — Z79.84 LONG TERM (CURRENT) USE OF ORAL HYPOGLYCEMIC DRUGS: ICD-10-CM

## 2022-06-15 DIAGNOSIS — Z79.899 OTHER LONG TERM (CURRENT) DRUG THERAPY: ICD-10-CM

## 2022-06-15 DIAGNOSIS — I50.9 HEART FAILURE, UNSPECIFIED: ICD-10-CM

## 2022-06-15 DIAGNOSIS — Z79.4 LONG TERM (CURRENT) USE OF INSULIN: ICD-10-CM

## 2022-06-22 ENCOUNTER — NON-APPOINTMENT (OUTPATIENT)
Age: 82
End: 2022-06-22

## 2022-06-25 PROBLEM — R09.89 PULMONARY HYPERINFLATION: Status: ACTIVE | Noted: 2020-01-16

## 2022-06-25 NOTE — ASSESSMENT
[FreeTextEntry1] : Mr. 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, former smoker, chronic hypoxemic and hypercapnic respiratory failure on home oxygen and nocturnal BiPAP, history of cardiac arrest in 2018 due to respiratory failure, HTN, HLD, DM2 (not on insulin), CAD s/p 3V-CABG (2004) and PCI (Oct 2019 & Aug 2021), PVD s/p bilateral LE stents (most recently Nov 2019) on plavix, A-Fib on apixaban, BPH, and left femur fracture with chronic OM (s/p recent drainage in Dec 2021 of intramedullary abscess, on chronic ciprofloxacin) who presents for follow-up after recent hospitalization at  (ICU-->floors) with acute hypercapnic respiratory failure due to recurrent Asthma-COPD exacerbation (note last exacerbation in Sept 2021). Etiology likely related to seasonal allergies vs. recently turning on a/c vs. outside construction in front of house. He improved with BiPAP, nebs, and methylprednisolone. Last pulmonary hospitalizations were Sept 2021 and May 2021. Currently improved.\par \par 1. 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):\par - Continue slow prednisone taper, to resume prednisone 2 mg daily\par - Next Mepolizumab injection tomorrow \par - Oxygen saturation is currently >95% at rest, drops to 90% when he walks to the bathroom\par - Continue Breo Ellipta 200-25 mcg 1 inhalation daily\par - Incruse Ellipta 1 inhalation daily\par - Montelukast 10 mg at bedtime\par - Resume Azithromycin 250 mg MWF\par - Ventolin prn\par - Consider restarting pulmonary rehab\par - Increase exercise as tolerated, including walking in the house and outdoors, using stationary bike and treadmill, swimming in pool with supervision\par - Repeat PFTs, EOT, 6-MWD\par - Reconsider BLVR if continues to experience exacerbations despite increasing exercise\par - PFTs (Dec 2020) with severe obstruction, increased TLC and RV consistent with air trapping and dynamic hyperinflation. There is moderately reduced diffusing capacity. Needs repeat PFTs\par - ABG during admission in May 2022 with pH 7.23, pCO2 65, pO2 95, SaO2 98%, then improved with BiPAP to pH 7.41, pCO2 48, pO2 82, SaO2 98%\par - Resolved eosinophilia with mepolizumab and prednisone. IgE previously elevated to 118 in Dec 2019 with allergy testing positive for house dust. Aspergillus IgE negative. Alpha-1 antitrypsin normal and HIV negative\par \par 2. Chronic hypoxemia and hypercapnic respiratory failure:\par - Likely due to asthma/COPD\par - Continue supplemental oxygen with nasal cannula 2-3 LPM with exercise, goal SpO2>88%\par - BiPAP every night, IPAP 18, EPAP 8\par - 6MWD (Dec 2020) is 148 meters using cane and 2L NC. EOT (Dec 2020) required 2L NC\par \par 3. Abnormal CT Chest\par - CT Chest in June 2020 with interval predominant resolution of the previously seen LLL consolidation with minimal residual atelectasis. New RLL area of linear atelectasis\par - Hold off on further imaging at this time\par \par 4. Bilateral leg edema:\par - Improved with low salt diet\par - 2D-echo in August 2021 with mild segmental LV systolic dysfunction with hypokinesis of the apical inferior, apical septum, apical lateral, basal inferior, mid anterior, and mid inferior walls. Also with mild diastolic dysfunction\par - Continue cardiac meds, including apixaban, clopidogrel, rosuvastatin, metoprolol\par - Continue low salt diet, keep legs elevated and compression stockings\par - No evidence of pulmonary hypertension on recent 2D-echo\par \par 5. Upper airway cough syndrome:\par - Continue fluticasone nasal spray, 1-2 sprays per nostril twice daily\par - Azelastine nasal spray twice daily\par \par 6. HCM:\par - Up-to-date with influenza, pneumococcal, and COVID vaccinations\par - Self-injecting Mepolizumab at home\par \par 7. Follow-up in 2-3 months or sooner prn, needs PFTs/EOT/6MWD

## 2022-06-25 NOTE — HISTORY OF PRESENT ILLNESS
[FreeTextEntry1] : Mr. 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, former smoker, chronic hypoxemic and hypercapnic respiratory failure on home oxygen and nocturnal BiPAP, history of cardiac arrest in 2018 due to respiratory failure, HTN, HLD, DM2 (not on insulin), CAD s/p 3V-CABG (2004) and PCI (Oct 2019 & Aug 2021), PVD s/p bilateral LE stents (most recently Nov 2019) on plavix, A-Fib on apixaban, BPH, and  left femur fracture with chronic OM (s/p recent drainage in Dec 2021 of intramedullary abscess, on chronic ciprofloxacin) who presents for follow-up after recent hospitalization at  (ICU-->floors) with acute hypercapnic respiratory failure due to recurrent Asthma-COPD exacerbation (note last exacerbation in Sept 2021). Etiology likely related to seasonal allergies vs. recently turning on a/c vs. outside construction in front of house. He improved with BiPAP, nebs, and methylprednisolone. Last pulmonary hospitalizations were Sept 2021 and May 2021. \par \par He was seen by visiting nurse yesterday and will visit him once per week for 4 weeks. He reports feeling well currently. He is down to 20 mg of prednisone and completing prednisone taper to return to home dose of 2 mg daily. Next Nucala tomorrow. Oxygen saturation is currently >95% at rest, goes down to 90% when he walks to the bathroom. Currently, he reports mild dyspnea. His wife administers Mepolizumab every month. He is currently on prednisone 2 mg daily. He is adherent with Breo Ellipta 200-25 mcg and Incruse Ellipta. He also takes montelukast and azithromycin 250 mg MWF. He is adherent with BiPAP (IPAP 18, EPAP 8) every night from 11pm until 9am. He also wears oxygen during the day occasionally with walking, but wife reports that saturation is up to 95% with going up a flight of stairs. Wife reports that glucose is elevated to 200s, although this morning is down to 118. Wife is giving Novolog and he is also taking Jardiance. His losartan and metformin were held given Cr 1.6-1.7. Eliquis was decreased to 2.5 mg twice daily. He remains on cipro 500 mg bid for the leg infection. \par \par He has not went for pulmonary rehab. Declining physical therapy at home or pulmonary rehab at this time. Has weights, stationary bike, treadmill, and pool at home, and reports that he will start using. He goes to sleep at around 11pm-12am, wakes up at 9am, then naps from 1pm to 2-3 pm. \par \par ABG (May 2022) 7.23/65/95/98% --> improved to 7.41/48/82/97%.\par \par Last 2D-echo in Dec 2021 with mild diastolic dysfunction, normal LV systolic function, no significant valvular disease, and no pulmonary hypertension (estimated RVSP is 28). \par \par PFTs (May 2022) with stable severe obstruction. Increased TLC and RV consistent with dynamic hyperinflation and air trapping. Diffusion capacity is moderately reduced.\par \par 6MWD (May 2022) improved to 173 meters from 148 meters in Dec 2022.\par \par Exercise oximetry (May 2022) with normal oxygen saturation at rest (97%), but developed hypoxemia to 85% while walking at 1.4 MPH requiring 2L NC with recovery to 90%. \par \par Last CT chest in June 2020 with interval improvement and near resolution of LLL pneumonia with minimal residual atelectasis. There is new linear atelectasis in the RLL. Stable emphysema.\par \par Regarding his smoking history, he quit smoking in the 1980s, used to smoke 1 ppd for 30 years. Was smoking marijuana about 3-4 joints 3x/week until 2017. No alcohol use. No other drug use

## 2022-06-25 NOTE — PHYSICAL THERAPY INITIAL EVALUATION ADULT - PHYSICAL ASSIST/NONPHYSICAL ASSIST: STAIR NEGOTIATION, REHAB EVAL
1 person assist

## 2022-06-25 NOTE — REASON FOR VISIT
[Follow-Up - From Hospitalization] : a follow-up visit after a recent hospitalization [Asthma] : asthma [COPD] : COPD [Home] : at home, [unfilled] , at the time of the visit. [Medical Office: ([facility])___] : at the medical office located in  [Spouse] : spouse [Patient] : the patient [Self] : self [FreeTextEntry4] : wife Mercedes

## 2022-06-25 NOTE — PHYSICAL EXAM
[Well Groomed] : well groomed [General Appearance - In No Acute Distress] : no acute distress [Normal Conjunctiva] : the conjunctiva exhibited no abnormalities [Normal Oropharynx] : normal oropharynx [Neck Appearance] : the appearance of the neck was normal [Neck Cervical Mass (___cm)] : no neck mass was observed [Jugular Venous Distention Increased] : there was no jugular-venous distention [Heart Rate And Rhythm] : heart rate and rhythm were normal [Heart Sounds] : normal S1 and S2 [Murmurs] : no murmurs present [Arterial Pulses Normal] : the arterial pulses were normal [Respiration, Rhythm And Depth] : normal respiratory rhythm and effort [Exaggerated Use Of Accessory Muscles For Inspiration] : no accessory muscle use [Abdomen Soft] : soft [Abdomen Tenderness] : non-tender [Abnormal Walk] : normal gait [Nail Clubbing] : no clubbing of the fingernails [Cyanosis, Localized] : no localized cyanosis [Cranial Nerves] : cranial nerves 2-12 were intact [Motor Exam] : the motor exam was normal [Skin Color & Pigmentation] : normal skin color and pigmentation [Skin Lesions] : no skin lesions [FreeTextEntry1] : decreased air entry bilaterally; no wheezing [General Appearance - Well Developed] : well developed [Normal Appearance] : normal appearance [General Appearance - Well Nourished] : well nourished [] : no respiratory distress [Oriented To Time, Place, And Person] : oriented to person, place, and time [Affect] : the affect was normal [Mood] : the mood was normal

## 2022-06-27 ENCOUNTER — NON-APPOINTMENT (OUTPATIENT)
Age: 82
End: 2022-06-27

## 2022-06-28 ENCOUNTER — OUTPATIENT (OUTPATIENT)
Dept: OUTPATIENT SERVICES | Facility: HOSPITAL | Age: 82
LOS: 1 days | Discharge: HOME | End: 2022-06-28
Payer: COMMERCIAL

## 2022-06-28 ENCOUNTER — APPOINTMENT (OUTPATIENT)
Dept: WOUND CARE | Facility: HOSPITAL | Age: 82
End: 2022-06-28

## 2022-06-28 VITALS
SYSTOLIC BLOOD PRESSURE: 106 MMHG | HEIGHT: 70 IN | BODY MASS INDEX: 31.35 KG/M2 | TEMPERATURE: 97.8 F | HEART RATE: 75 BPM | OXYGEN SATURATION: 96 % | RESPIRATION RATE: 20 BRPM | DIASTOLIC BLOOD PRESSURE: 72 MMHG | WEIGHT: 219 LBS

## 2022-06-28 DIAGNOSIS — L97.112 NON-PRESSURE CHRONIC ULCER OF RIGHT THIGH WITH FAT LAYER EXPOSED: ICD-10-CM

## 2022-06-28 DIAGNOSIS — E11.622 TYPE 2 DIABETES MELLITUS WITH OTHER SKIN ULCER: ICD-10-CM

## 2022-06-28 DIAGNOSIS — Z95.5 PRESENCE OF CORONARY ANGIOPLASTY IMPLANT AND GRAFT: Chronic | ICD-10-CM

## 2022-06-28 DIAGNOSIS — T14.8XXA OTHER INJURY OF UNSPECIFIED BODY REGION, INITIAL ENCOUNTER: Chronic | ICD-10-CM

## 2022-06-28 DIAGNOSIS — S71.101A UNSPECIFIED OPEN WOUND, RIGHT THIGH, INITIAL ENCOUNTER: ICD-10-CM

## 2022-06-28 PROCEDURE — 99213 OFFICE O/P EST LOW 20 MIN: CPT

## 2022-06-28 PROCEDURE — G0463: CPT

## 2022-06-28 NOTE — ASSESSMENT
[Verbal] : Verbal [Patient] : Patient [Good - alert, interested, motivated] : Good - alert, interested, motivated [Skin Care] : skin care [Signs and symptoms of infection] : sign and symptoms of infection [How and When to Call] : how and when to call [Patient responsibility to plan of care] : patient responsibility to plan of care [] : Yes [Stable] : stable [Home] : Home [Ambulatory] : Ambulatory [Not Applicable - Long Term Care/Home Health Agency] : Long Term Care/Home Health Agency: Not Applicable [FreeTextEntry2] : Maintain optimal skin integrity [FreeTextEntry4] : \par f/u 1 month

## 2022-06-28 NOTE — PHYSICAL EXAM
[JVD] : no jugular venous distention  [Normal Thyroid] : the thyroid was normal [Normal Breath Sounds] : Normal breath sounds [Normal Heart Sounds] : normal heart sounds [Normal Rate and Rhythm] : normal rate and rhythm [Ankle Swelling (On Exam)] : not present [Varicose Veins Of Lower Extremities] : not present [] : not present [Abdomen Masses] : No abdominal massess [Abdomen Tenderness] : ~T ~M No abdominal tenderness [Tender] : nontender [Enlarged] : not enlarged [Alert] : alert [Oriented to Person] : oriented to person [Oriented to Place] : oriented to place [Oriented to Time] : oriented to time [Calm] : calm [de-identified] : elderly BM, NAD, alert, Ox3. [FreeTextEntry1] : Left lateral thigh - scar tissue - no open wound

## 2022-06-29 DIAGNOSIS — Z79.2 LONG TERM (CURRENT) USE OF ANTIBIOTICS: ICD-10-CM

## 2022-06-29 DIAGNOSIS — E11.622 TYPE 2 DIABETES MELLITUS WITH OTHER SKIN ULCER: ICD-10-CM

## 2022-06-29 DIAGNOSIS — I11.0 HYPERTENSIVE HEART DISEASE WITH HEART FAILURE: ICD-10-CM

## 2022-06-29 DIAGNOSIS — Z79.02 LONG TERM (CURRENT) USE OF ANTITHROMBOTICS/ANTIPLATELETS: ICD-10-CM

## 2022-06-29 DIAGNOSIS — E78.00 PURE HYPERCHOLESTEROLEMIA, UNSPECIFIED: ICD-10-CM

## 2022-06-29 DIAGNOSIS — E11.51 TYPE 2 DIABETES MELLITUS WITH DIABETIC PERIPHERAL ANGIOPATHY WITHOUT GANGRENE: ICD-10-CM

## 2022-06-29 DIAGNOSIS — M81.0 AGE-RELATED OSTEOPOROSIS WITHOUT CURRENT PATHOLOGICAL FRACTURE: ICD-10-CM

## 2022-06-29 DIAGNOSIS — J45.50 SEVERE PERSISTENT ASTHMA, UNCOMPLICATED: ICD-10-CM

## 2022-06-29 DIAGNOSIS — L97.112 NON-PRESSURE CHRONIC ULCER OF RIGHT THIGH WITH FAT LAYER EXPOSED: ICD-10-CM

## 2022-06-29 DIAGNOSIS — Z79.4 LONG TERM (CURRENT) USE OF INSULIN: ICD-10-CM

## 2022-06-29 DIAGNOSIS — I50.9 HEART FAILURE, UNSPECIFIED: ICD-10-CM

## 2022-06-29 DIAGNOSIS — J44.9 CHRONIC OBSTRUCTIVE PULMONARY DISEASE, UNSPECIFIED: ICD-10-CM

## 2022-06-29 DIAGNOSIS — Z79.899 OTHER LONG TERM (CURRENT) DRUG THERAPY: ICD-10-CM

## 2022-06-29 DIAGNOSIS — Z95.5 PRESENCE OF CORONARY ANGIOPLASTY IMPLANT AND GRAFT: ICD-10-CM

## 2022-06-29 DIAGNOSIS — Z98.890 OTHER SPECIFIED POSTPROCEDURAL STATES: ICD-10-CM

## 2022-06-29 DIAGNOSIS — Z79.01 LONG TERM (CURRENT) USE OF ANTICOAGULANTS: ICD-10-CM

## 2022-06-29 DIAGNOSIS — Z79.84 LONG TERM (CURRENT) USE OF ORAL HYPOGLYCEMIC DRUGS: ICD-10-CM

## 2022-06-29 DIAGNOSIS — Z95.1 PRESENCE OF AORTOCORONARY BYPASS GRAFT: ICD-10-CM

## 2022-07-08 NOTE — ED PROVIDER NOTE - ST/T WAVE
no lesions,  no deformities,  no traumatic injuries,  no significant scars are present,  chest wall non-tender,  no masses present, breathing is unlabored without accessory muscle use,normal breath sounds
No st elevations or depressions.  Grossly normal t wave morphology.

## 2022-07-11 ENCOUNTER — APPOINTMENT (OUTPATIENT)
Dept: VASCULAR SURGERY | Facility: CLINIC | Age: 82
End: 2022-07-11

## 2022-07-11 PROCEDURE — 93925 LOWER EXTREMITY STUDY: CPT

## 2022-07-14 NOTE — H&P ADULT - HISTORY OF PRESENT ILLNESS
Refill Request  lisdexamfetamine (Vyvanse) 30 MG capsule    Last filled: 7/14/2022 Patient is a 77 yo male with pmhx of htn, DM2 (occasional insulin use when on steroids), asthma, COPD (2L home/ BIPAP at night), chronic hypoxemic resp failure, CABG, HLD, hypercapnic resp failure c/b with cardiac arrest, presenting to ED with SOB. Per patient and wife, started experiencing mild SOB yesterday night around 9pm, wife stated that patient complained of feeling more tired than usual, increased his O2 to 3L, and gave him a nebulizer treatment. Patient was supposed to be on bipap while sleeping at night, however, per wife, she woke up around 4am and found patient took off his bipap and was on NC. Patient complained of worsening SOB this AM, however, was able to get dressed and get into the car independently, however, wife stated that patient was unable to get out of the car when they arrived at the hospital. Per wife, patient is noncompliant with his bipap at night because he states that it is very uncomfortable. Patient also stated that for the past 3 days, he has increased his ventolin inhaler use, and yesterday he required ventolin 5x. Patient also reports left ear pain for the past 3 days with no redness or swelling and persistent dry cough. Patient was recently hospitalized at [place] in 12/2018 for acute on chronic resp failure with hypoxia and hypercapnea. Was d/c home on PO steroids with last dose on 12/29/18. Per patient and wife, ever since discharge on 12/24/18 from [place], patient has been more tired and has significant decrease in physical activity. Patient stated that he saw Dr. Dejesus recently and was told to restart daliresp. Patient had grandchildren visiting last night who was recovering from a cold, however, denies any other recent sick contacts. Denies any fever, dizziness, visual changes, chest pain, palpitations, abdominal pain, nausea, vomiting, diarrhea, dysuria, hematuria, or melena.     In the ED, Initially saturating 50% on room air, tachycardic 135bpm  most recent vitals: afebrile, 116bpm, 131/70, 94% on bipap  CBC showed mild leukocytosis 12.35  CMP: CO2 33, glucose 308  Ddimer 167  most recent abg: pH 7.24, CO2 71, O2 77, HCO3 25, base 2.4, O2 sat 93  Flu A/B/RSV negative  CXR consistent with COPD, otherwise unremarkable for any other acute findings  doppler negative for LE DVT  EKG showed sinus tachy 116bpm, otherwise no significant changes when compared to previous ekg  Received 1x NS bolus, 2x duoneb, 1x mag sulfate, 1x solumedrol in ED

## 2022-07-20 NOTE — ED ADULT NURSE NOTE - NSSISCREENINGQ3_ED_A_ED
Recommendations:  Referrals: Discussed that Shi would benefit from consultation with child psychiatry to address anxiety. Contact information provided for Dr. Rees [phone] at [facility]. Parent may also contact their insurance and request a list of child psychiatry providers in network.     ADHD medication deferred at this time.     School: Continue current 504 plan.     Therapies:  Continue private therapy services including outpatient behavioral therapy. Discussed consulting with Shi's provider and considering increasing frequency of sessions to weekly or every other week due to recent increase in anxiety symptoms.     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 Questionnaires were emailed to parent.     Other: Parent to sign a release of information allowing this writer to collaborate with her therapist. Please upload the completed form to [employer].     Follow up in 6 months.    No

## 2022-07-26 ENCOUNTER — APPOINTMENT (OUTPATIENT)
Dept: WOUND CARE | Facility: HOSPITAL | Age: 82
End: 2022-07-26

## 2022-08-01 NOTE — PATIENT PROFILE ADULT. - FUNCTIONAL SCREEN CURRENT LEVEL: AMBULATION, MLM
[facility]  ED Nurse Handoff Report    ED Chief complaint: Slurred Speech      ED Diagnosis:   Final diagnoses:   Cerebrovascular accident (CVA), unspecified mechanism (H)   Aphasia       Code Status: See hospitalist    Allergies: No Known Allergies    Patient Story: Pt here with slurred speech and word finding difficulty  Focused Assessment:  Pt went to bed around 2330 last night.. Awoke with slurred speech and some word finding difficulty. He is on Eloquis for a-fib. No other deficits noted. FYI, Pt's HR drops to mid 40's while he is sleeping.     Treatments and/or interventions provided: Pt had CT and MRI scans.   Patient's response to treatments and/or interventions: Pt is stable.    To be done/followed up on inpatient unit:  Unknown    Does this patient have any cognitive concerns?: Alert and oriented    Activity level - Baseline/Home:  Independent  Activity Level - Current:   Stand with Assist    Patient's Preferred language: English   Needed?: No    Isolation: None  Infection: Not Applicable  Patient tested for COVID 19 prior to admission: YES  Bariatric?: No    Vital Signs:   Vitals:    08/01/22 0945 08/01/22 1000 08/01/22 1100 08/01/22 1130   BP: (!) 151/92 (!) 162/83 (!) 162/82 (!) 141/118   Pulse: 82 78  56   Resp: 16 15  25   Temp: 97.6  F (36.4  C)      TempSrc: Temporal      SpO2: 97% 95%  95%   Weight:           Cardiac Rhythm: Atrial fib      Was the PSS-3 completed:   Yes  What interventions are required if any?               Family Comments: Wife and son were here.   OBS brochure/video discussed/provided to patient/family: NA              Name of person given brochure if not patient: NA              Relationship to patient: NA    For the majority of the shift this patient's behavior was Green.   Behavioral interventions performed were NA.    ED NURSE PHONE NUMBER: [phone]          (2) assistive person

## 2022-08-04 ENCOUNTER — INPATIENT (INPATIENT)
Facility: HOSPITAL | Age: 82
LOS: 0 days | Discharge: HOME | DRG: 189 | End: 2022-08-05
Attending: FAMILY MEDICINE | Admitting: STUDENT IN AN ORGANIZED HEALTH CARE EDUCATION/TRAINING PROGRAM
Payer: COMMERCIAL

## 2022-08-04 VITALS
WEIGHT: 210.1 LBS | SYSTOLIC BLOOD PRESSURE: 159 MMHG | OXYGEN SATURATION: 99 % | HEART RATE: 99 BPM | TEMPERATURE: 98 F | DIASTOLIC BLOOD PRESSURE: 78 MMHG | RESPIRATION RATE: 18 BRPM | HEIGHT: 71 IN

## 2022-08-04 DIAGNOSIS — T14.8XXA OTHER INJURY OF UNSPECIFIED BODY REGION, INITIAL ENCOUNTER: Chronic | ICD-10-CM

## 2022-08-04 DIAGNOSIS — I48.0 PAROXYSMAL ATRIAL FIBRILLATION: ICD-10-CM

## 2022-08-04 DIAGNOSIS — Z95.5 PRESENCE OF CORONARY ANGIOPLASTY IMPLANT AND GRAFT: Chronic | ICD-10-CM

## 2022-08-04 DIAGNOSIS — I25.10 ATHEROSCLEROTIC HEART DISEASE OF NATIVE CORONARY ARTERY WITHOUT ANGINA PECTORIS: ICD-10-CM

## 2022-08-04 DIAGNOSIS — J96.22 ACUTE AND CHRONIC RESPIRATORY FAILURE WITH HYPERCAPNIA: ICD-10-CM

## 2022-08-04 DIAGNOSIS — Z98.890 OTHER SPECIFIED POSTPROCEDURAL STATES: Chronic | ICD-10-CM

## 2022-08-04 DIAGNOSIS — J44.1 CHRONIC OBSTRUCTIVE PULMONARY DISEASE WITH (ACUTE) EXACERBATION: ICD-10-CM

## 2022-08-04 DIAGNOSIS — I73.9 PERIPHERAL VASCULAR DISEASE, UNSPECIFIED: ICD-10-CM

## 2022-08-04 DIAGNOSIS — N18.30 CHRONIC KIDNEY DISEASE, STAGE 3 UNSPECIFIED: ICD-10-CM

## 2022-08-04 DIAGNOSIS — N40.0 BENIGN PROSTATIC HYPERPLASIA WITHOUT LOWER URINARY TRACT SYMPTOMS: ICD-10-CM

## 2022-08-04 DIAGNOSIS — I10 ESSENTIAL (PRIMARY) HYPERTENSION: ICD-10-CM

## 2022-08-04 DIAGNOSIS — E11.9 TYPE 2 DIABETES MELLITUS WITHOUT COMPLICATIONS: ICD-10-CM

## 2022-08-04 DIAGNOSIS — Z29.9 ENCOUNTER FOR PROPHYLACTIC MEASURES, UNSPECIFIED: ICD-10-CM

## 2022-08-04 LAB
ALBUMIN SERPL ELPH-MCNC: 3.6 G/DL — SIGNIFICANT CHANGE UP (ref 3.3–5)
ALP SERPL-CCNC: 102 U/L — SIGNIFICANT CHANGE UP (ref 40–120)
ALT FLD-CCNC: 42 U/L — SIGNIFICANT CHANGE UP (ref 12–78)
ANION GAP SERPL CALC-SCNC: 8 MMOL/L — SIGNIFICANT CHANGE UP (ref 5–17)
APTT BLD: 37.9 SEC — HIGH (ref 27.5–35.5)
AST SERPL-CCNC: 38 U/L — HIGH (ref 15–37)
BASE EXCESS BLDA CALC-SCNC: 2.6 MMOL/L — SIGNIFICANT CHANGE UP (ref -2–3)
BASOPHILS # BLD AUTO: 0.01 K/UL — SIGNIFICANT CHANGE UP (ref 0–0.2)
BASOPHILS NFR BLD AUTO: 0.1 % — SIGNIFICANT CHANGE UP (ref 0–2)
BILIRUB SERPL-MCNC: 0.4 MG/DL — SIGNIFICANT CHANGE UP (ref 0.2–1.2)
BLOOD GAS COMMENTS ARTERIAL: SIGNIFICANT CHANGE UP
BUN SERPL-MCNC: 25 MG/DL — HIGH (ref 7–23)
CALCIUM SERPL-MCNC: 9.3 MG/DL — SIGNIFICANT CHANGE UP (ref 8.5–10.1)
CHLORIDE SERPL-SCNC: 106 MMOL/L — SIGNIFICANT CHANGE UP (ref 96–108)
CK MB CFR SERPL CALC: 3.8 NG/ML — HIGH (ref 0–3.6)
CO2 SERPL-SCNC: 27 MMOL/L — SIGNIFICANT CHANGE UP (ref 22–31)
CREAT SERPL-MCNC: 1.3 MG/DL — SIGNIFICANT CHANGE UP (ref 0.5–1.3)
EGFR: 55 ML/MIN/1.73M2 — LOW
EOSINOPHIL # BLD AUTO: 0 K/UL — SIGNIFICANT CHANGE UP (ref 0–0.5)
EOSINOPHIL NFR BLD AUTO: 0 % — SIGNIFICANT CHANGE UP (ref 0–6)
GAS PNL BLDA: SIGNIFICANT CHANGE UP
GLUCOSE SERPL-MCNC: 185 MG/DL — HIGH (ref 70–99)
HCO3 BLDA-SCNC: 29 MMOL/L — HIGH (ref 21–28)
HCT VFR BLD CALC: 47.7 % — SIGNIFICANT CHANGE UP (ref 39–50)
HGB BLD-MCNC: 14.6 G/DL — SIGNIFICANT CHANGE UP (ref 13–17)
IMM GRANULOCYTES NFR BLD AUTO: 0.4 % — SIGNIFICANT CHANGE UP (ref 0–1.5)
INR BLD: 1.06 RATIO — SIGNIFICANT CHANGE UP (ref 0.88–1.16)
LACTATE SERPL-SCNC: 1.2 MMOL/L — SIGNIFICANT CHANGE UP (ref 0.7–2)
LYMPHOCYTES # BLD AUTO: 0.63 K/UL — LOW (ref 1–3.3)
LYMPHOCYTES # BLD AUTO: 5.7 % — LOW (ref 13–44)
MAGNESIUM SERPL-MCNC: 2.2 MG/DL — SIGNIFICANT CHANGE UP (ref 1.6–2.6)
MCHC RBC-ENTMCNC: 27.2 PG — SIGNIFICANT CHANGE UP (ref 27–34)
MCHC RBC-ENTMCNC: 30.6 GM/DL — LOW (ref 32–36)
MCV RBC AUTO: 88.8 FL — SIGNIFICANT CHANGE UP (ref 80–100)
MONOCYTES # BLD AUTO: 0.39 K/UL — SIGNIFICANT CHANGE UP (ref 0–0.9)
MONOCYTES NFR BLD AUTO: 3.5 % — SIGNIFICANT CHANGE UP (ref 2–14)
NEUTROPHILS # BLD AUTO: 10.04 K/UL — HIGH (ref 1.8–7.4)
NEUTROPHILS NFR BLD AUTO: 90.3 % — HIGH (ref 43–77)
NRBC # BLD: 0 /100 WBCS — SIGNIFICANT CHANGE UP (ref 0–0)
NT-PROBNP SERPL-SCNC: 504 PG/ML — HIGH (ref 0–450)
PCO2 BLDA: 59 MMHG — HIGH (ref 35–48)
PH BLDA: 7.3 — LOW (ref 7.35–7.45)
PLATELET # BLD AUTO: 238 K/UL — SIGNIFICANT CHANGE UP (ref 150–400)
PO2 BLDA: 97 MMHG — SIGNIFICANT CHANGE UP (ref 83–108)
POTASSIUM SERPL-MCNC: 4.4 MMOL/L — SIGNIFICANT CHANGE UP (ref 3.5–5.3)
POTASSIUM SERPL-SCNC: 4.4 MMOL/L — SIGNIFICANT CHANGE UP (ref 3.5–5.3)
PROT SERPL-MCNC: 7.2 G/DL — SIGNIFICANT CHANGE UP (ref 6–8.3)
PROTHROM AB SERPL-ACNC: 12.4 SEC — SIGNIFICANT CHANGE UP (ref 10.5–13.4)
RAPID RVP RESULT: SIGNIFICANT CHANGE UP
RBC # BLD: 5.37 M/UL — SIGNIFICANT CHANGE UP (ref 4.2–5.8)
RBC # FLD: 13.6 % — SIGNIFICANT CHANGE UP (ref 10.3–14.5)
SAO2 % BLDA: 98.4 % — HIGH (ref 94–98)
SARS-COV-2 RNA SPEC QL NAA+PROBE: SIGNIFICANT CHANGE UP
SODIUM SERPL-SCNC: 141 MMOL/L — SIGNIFICANT CHANGE UP (ref 135–145)
TROPONIN I, HIGH SENSITIVITY RESULT: 9.6 NG/L — SIGNIFICANT CHANGE UP
WBC # BLD: 11.12 K/UL — HIGH (ref 3.8–10.5)
WBC # FLD AUTO: 11.12 K/UL — HIGH (ref 3.8–10.5)

## 2022-08-04 PROCEDURE — 99223 1ST HOSP IP/OBS HIGH 75: CPT | Mod: GC

## 2022-08-04 PROCEDURE — 93010 ELECTROCARDIOGRAM REPORT: CPT

## 2022-08-04 PROCEDURE — 99285 EMERGENCY DEPT VISIT HI MDM: CPT

## 2022-08-04 PROCEDURE — 71045 X-RAY EXAM CHEST 1 VIEW: CPT | Mod: 26

## 2022-08-04 RX ORDER — FUROSEMIDE 40 MG
40 TABLET ORAL ONCE
Refills: 0 | Status: COMPLETED | OUTPATIENT
Start: 2022-08-04 | End: 2022-08-04

## 2022-08-04 RX ORDER — DEXTROSE 50 % IN WATER 50 %
25 SYRINGE (ML) INTRAVENOUS ONCE
Refills: 0 | Status: DISCONTINUED | OUTPATIENT
Start: 2022-08-04 | End: 2022-08-05

## 2022-08-04 RX ORDER — DEXTROSE 50 % IN WATER 50 %
12.5 SYRINGE (ML) INTRAVENOUS ONCE
Refills: 0 | Status: DISCONTINUED | OUTPATIENT
Start: 2022-08-04 | End: 2022-08-05

## 2022-08-04 RX ORDER — IPRATROPIUM/ALBUTEROL SULFATE 18-103MCG
3 AEROSOL WITH ADAPTER (GRAM) INHALATION EVERY 6 HOURS
Refills: 0 | Status: DISCONTINUED | OUTPATIENT
Start: 2022-08-04 | End: 2022-08-05

## 2022-08-04 RX ORDER — TAMSULOSIN HYDROCHLORIDE 0.4 MG/1
0.4 CAPSULE ORAL AT BEDTIME
Refills: 0 | Status: DISCONTINUED | OUTPATIENT
Start: 2022-08-04 | End: 2022-08-05

## 2022-08-04 RX ORDER — DEXTROSE 50 % IN WATER 50 %
15 SYRINGE (ML) INTRAVENOUS ONCE
Refills: 0 | Status: DISCONTINUED | OUTPATIENT
Start: 2022-08-04 | End: 2022-08-05

## 2022-08-04 RX ORDER — SODIUM CHLORIDE 9 MG/ML
1000 INJECTION, SOLUTION INTRAVENOUS
Refills: 0 | Status: DISCONTINUED | OUTPATIENT
Start: 2022-08-04 | End: 2022-08-05

## 2022-08-04 RX ORDER — ACETAMINOPHEN 500 MG
650 TABLET ORAL EVERY 6 HOURS
Refills: 0 | Status: DISCONTINUED | OUTPATIENT
Start: 2022-08-04 | End: 2022-08-05

## 2022-08-04 RX ORDER — TIOTROPIUM BROMIDE 18 UG/1
1 CAPSULE ORAL; RESPIRATORY (INHALATION) DAILY
Refills: 0 | Status: DISCONTINUED | OUTPATIENT
Start: 2022-08-04 | End: 2022-08-05

## 2022-08-04 RX ORDER — MONTELUKAST 4 MG/1
10 TABLET, CHEWABLE ORAL DAILY
Refills: 0 | Status: DISCONTINUED | OUTPATIENT
Start: 2022-08-04 | End: 2022-08-05

## 2022-08-04 RX ORDER — METOPROLOL TARTRATE 50 MG
50 TABLET ORAL EVERY 12 HOURS
Refills: 0 | Status: DISCONTINUED | OUTPATIENT
Start: 2022-08-04 | End: 2022-08-05

## 2022-08-04 RX ORDER — APIXABAN 2.5 MG/1
2.5 TABLET, FILM COATED ORAL ONCE
Refills: 0 | Status: COMPLETED | OUTPATIENT
Start: 2022-08-04 | End: 2022-08-04

## 2022-08-04 RX ORDER — BUDESONIDE AND FORMOTEROL FUMARATE DIHYDRATE 160; 4.5 UG/1; UG/1
2 AEROSOL RESPIRATORY (INHALATION)
Refills: 0 | Status: DISCONTINUED | OUTPATIENT
Start: 2022-08-04 | End: 2022-08-05

## 2022-08-04 RX ORDER — AZITHROMYCIN 500 MG/1
250 TABLET, FILM COATED ORAL
Refills: 0 | Status: DISCONTINUED | OUTPATIENT
Start: 2022-08-04 | End: 2022-08-05

## 2022-08-04 RX ORDER — PREGABALIN 225 MG/1
1 CAPSULE ORAL
Qty: 0 | Refills: 0 | DISCHARGE

## 2022-08-04 RX ORDER — AZITHROMYCIN 500 MG/1
250 TABLET, FILM COATED ORAL
Refills: 0 | Status: DISCONTINUED | OUTPATIENT
Start: 2022-08-04 | End: 2022-08-04

## 2022-08-04 RX ORDER — APIXABAN 2.5 MG/1
2.5 TABLET, FILM COATED ORAL
Refills: 0 | Status: DISCONTINUED | OUTPATIENT
Start: 2022-08-04 | End: 2022-08-05

## 2022-08-04 RX ORDER — GLUCAGON INJECTION, SOLUTION 0.5 MG/.1ML
1 INJECTION, SOLUTION SUBCUTANEOUS ONCE
Refills: 0 | Status: DISCONTINUED | OUTPATIENT
Start: 2022-08-04 | End: 2022-08-05

## 2022-08-04 RX ORDER — ATORVASTATIN CALCIUM 80 MG/1
80 TABLET, FILM COATED ORAL AT BEDTIME
Refills: 0 | Status: DISCONTINUED | OUTPATIENT
Start: 2022-08-04 | End: 2022-08-05

## 2022-08-04 RX ORDER — IPRATROPIUM/ALBUTEROL SULFATE 18-103MCG
3 AEROSOL WITH ADAPTER (GRAM) INHALATION ONCE
Refills: 0 | Status: COMPLETED | OUTPATIENT
Start: 2022-08-04 | End: 2022-08-04

## 2022-08-04 RX ORDER — LANOLIN ALCOHOL/MO/W.PET/CERES
3 CREAM (GRAM) TOPICAL AT BEDTIME
Refills: 0 | Status: DISCONTINUED | OUTPATIENT
Start: 2022-08-04 | End: 2022-08-05

## 2022-08-04 RX ORDER — INSULIN LISPRO 100/ML
VIAL (ML) SUBCUTANEOUS AT BEDTIME
Refills: 0 | Status: DISCONTINUED | OUTPATIENT
Start: 2022-08-04 | End: 2022-08-05

## 2022-08-04 RX ORDER — MOXIFLOXACIN HYDROCHLORIDE TABLETS, 400 MG 400 MG/1
1 TABLET, FILM COATED ORAL
Qty: 0 | Refills: 0 | DISCHARGE

## 2022-08-04 RX ORDER — CLOPIDOGREL BISULFATE 75 MG/1
75 TABLET, FILM COATED ORAL DAILY
Refills: 0 | Status: DISCONTINUED | OUTPATIENT
Start: 2022-08-04 | End: 2022-08-05

## 2022-08-04 RX ORDER — INSULIN LISPRO 100/ML
VIAL (ML) SUBCUTANEOUS
Refills: 0 | Status: DISCONTINUED | OUTPATIENT
Start: 2022-08-04 | End: 2022-08-05

## 2022-08-04 RX ADMIN — ATORVASTATIN CALCIUM 80 MILLIGRAM(S): 80 TABLET, FILM COATED ORAL at 22:14

## 2022-08-04 RX ADMIN — Medication 40 MILLIGRAM(S): at 22:15

## 2022-08-04 RX ADMIN — Medication 125 MILLIGRAM(S): at 14:47

## 2022-08-04 RX ADMIN — APIXABAN 2.5 MILLIGRAM(S): 2.5 TABLET, FILM COATED ORAL at 22:15

## 2022-08-04 RX ADMIN — Medication 3 MILLILITER(S): at 14:47

## 2022-08-04 RX ADMIN — TAMSULOSIN HYDROCHLORIDE 0.4 MILLIGRAM(S): 0.4 CAPSULE ORAL at 22:15

## 2022-08-04 NOTE — ED PROVIDER NOTE - CLINICAL SUMMARY MEDICAL DECISION MAKING FREE TEXT BOX
82-year-old male with COPD exacerbation, to get steroids, duo nebs, placed on cardiac monitor, pulse oximeter, to obtain chest x-ray, EKG, CBC, CMP, cardiac enzymes, blood cultures, ABG to admit.

## 2022-08-04 NOTE — H&P ADULT - NSHPREVIEWOFSYSTEMS_GEN_ALL_CORE
CONSTITUTIONAL: denies fever, chills, fatigue,. + weakness  HEENT: denies blurred vision, sore throat, mucous.   SKIN: denies new lesions, rash  CARDIOVASCULAR: denies chest pain, chest pressure, palpitations.  + orthopnea.   RESPIRATORY: denies sputum production.  + shortness of breath   GASTROINTESTINAL: denies nausea, vomiting, diarrhea, abdominal pain  GENITOURINARY: denies dysuria  NEUROLOGICAL: denies numbness, headache, focal weakness  MUSCULOSKELETAL: denies new joint pain, muscle aches  HEMATOLOGIC: denies gross bleeding, bruising, melanas, hematochezia   LYMPHATICS: + extremity swelling  PSYCHIATRIC: denies recent changes in anxiety, depression

## 2022-08-04 NOTE — ED ADULT NURSE NOTE - NS ED NOTE ABUSE RESPONSE YN
Called lab [facility] to see if test could be added. Sample has already been discarded. Called pt and informed her of result note. Pt transferred to  to schedule lab appt to check FT4.     Order placed for pt per verbal order with read back from Dr. Norah Brizuela 02/23/17 Yes

## 2022-08-04 NOTE — H&P ADULT - NSICDXPASTMEDICALHX_GEN_ALL_CORE_FT
PAST MEDICAL HISTORY:  Asthma with COPD     BPH (benign prostatic hyperplasia)     CAD (Coronary Artery Disease) s/p 3v CABG 2004; stents placed in [place] in 2019    COPD (chronic obstructive pulmonary disease) on 2L at home and BiPAP at night; intubated 6/18    Diabetes Mellitus, Type II     Dyslipidemia     History of PAT (paroxysmal atrial tachycardia)     Hypertension     Osteomyelitis     PVD (peripheral vascular disease)

## 2022-08-04 NOTE — H&P ADULT - PROBLEM SELECTOR PLAN 1
- Pt with chronic hypoxic/hypercapnic respiratory failure on home O2 prn and nocturnal BIPAP.   - Started to have increasing effort to breath, SOB last Friday and worsening since yesterday morning. No fever  - Pt also with recent orthopnea and LE edema  - WBC 11.12, s/p prednisone at home x2  - Normal O2 saturation at RA, 99% with O2 by NC 2lt.   - CXR: no significant changes seen from previous, no pleural effusions, pending official reading.   -  (previous admission 242).   - TTE 12/2021:  eLVEF of 55-60%. Mild diastolic dysfunction. RV not well-visualized. No valvular disease.     - Lasix 40 mg IVP x1 now.   - Fluid restriction  - TTE ordered   - Monitor I&Os. - Pt with chronic hypoxic/hypercapnic respiratory failure on home O2 prn and nocturnal BIPAP.   - Started to have increasing effort to breath, SOB last Friday and worsening since yesterday morning. No fever  - Pt also with recent orthopnea and LE edema  - WBC 11.12, s/p prednisone at home x2  - Normal O2 saturation at RA, 99% with O2 by NC 2lt.   - CXR: no significant changes seen from previous, no pleural effusions, pending official reading.   - ABG 7.3/59/29/97.    -  (previous admission 242).   - TTE 12/2021:  eLVEF of 55-60%. Mild diastolic dysfunction. RV not well-visualized. No valvular disease.   - Lasix 40 mg IVP x1 now.   - Fluid restriction  - Monitor I&Os. - Pt with acute on chronic hypercapnic respiratory failure on home O2 prn and nocturnal BIPAP.   - Started to have increasing effort to breath, SOB last Friday and worsening since yesterday morning. No fever  - Pt also with recent orthopnea and LE edema  - WBC 11.12, s/p prednisone at home x2  - Normal O2 saturation at RA, 99% with O2 by NC 2lt.   - CXR: no significant changes seen from previous, no pleural effusions, pending official reading.   - ABG 7.3/59/29/97.    -  (previous admission 242).   - TTE 12/2021:  eLVEF of 55-60%. Mild diastolic dysfunction. RV not well-visualized. No valvular disease.   - ?component of volume overload given LE edema and orthopnea, given Lasix 40 mg IVP x1 now and will reassess volume status. Strict I&Os. Pt's wife admits to dietary indiscretion.   - Fluid restriction  - Monitor I&Os.

## 2022-08-04 NOTE — H&P ADULT - PROBLEM SELECTOR PLAN 5
- Chronic   - / 78 in triage   - At home on metoprolol, continue home dose.  Losartan was recently discontinued as per wife   - Monitor BP, will evaluate the need for losartan or additional meds.

## 2022-08-04 NOTE — ED ADULT NURSE NOTE - OBJECTIVE STATEMENT
Pt presents to the ED via ambulance from home s/p SOB, cough. Pt placed on 02 @ 2 liters via n/c, cardiac monitor, continuos pulse ox.

## 2022-08-04 NOTE — H&P ADULT - ASSESSMENT
Pt is 81 yo male with PMH of asthma and COPD with chronic hypoxic/hypercapnia (on home O2 prn and nocturnal BIPAP), CAD (s/p 3v CABG 2004, and PCI w/stents in 2019 and 2021), HTN, pAfib (on Eliquis), HLD, T2DM, PVD (s/p stenting in b/l legs), L femur chronic osteomyelitis (s/p intramedullary abscess drainage 12/2021), BPH, presents to the ED for increasing respiratory effort.  Admitted for asthma/COPD exacerbation, and r/o CHF component.       - Vitals:  /78, HR 99, RR 18, temp 97.5, O2 Sat 99%  - Labs: WBC 11.12.  Normal INR.  BUN 25/Cr 1.3.  Lactate normal.  Troponin normal.  pBNP 504.  ABG 7.3/59/29/97.      - EKG: NSR, 92 bpm.    Pt is 81 yo male with PMH of asthma and COPD with chronic hypoxic/hypercapnia (on home O2 prn and nocturnal BIPAP), CAD (s/p 3v CABG 2004, and PCI w/stents in 2019 and 2021), HTN, pAfib (on Eliquis), HLD, T2DM, PVD (s/p stenting in b/l legs), L femur chronic osteomyelitis (s/p intramedullary abscess drainage 12/2021), BPH, presents to the ED for increasing respiratory effort.  Admitted for asthma/COPD exacerbation, and r/o CHF component.  Pt is 83 yo male with PMH of asthma and COPD with chronic hypoxic/hypercapnia (on home O2 prn and nocturnal BIPAP), CAD (s/p 3v CABG 2004, and PCI w/stents in 2019 and 2021), HTN, pAfib (on Eliquis), HLD, T2DM, PVD (s/p stenting in b/l legs), L femur chronic osteomyelitis (s/p intramedullary abscess drainage 12/2021), BPH, presents to the ED for increasing respiratory effort.  Admitted for hypercapnic resp failure 2/2 COPD exacerbation

## 2022-08-04 NOTE — H&P ADULT - HISTORY OF PRESENT ILLNESS
Pt is 83 yo male with PMH of asthma and COPD with chronic hypoxic/hypercapnia (on home O2 prn and nocturnal BIPAP), CAD (s/p 3v CABG 2004, and PCI w/stents in 2019 and 2021), HTN, pAfib (on Eliquis), HLD, T2DM, PVD (s/p stenting in b/l legs), L femur chronic osteomyelitis (s/p intramedullary abscess drainage 12/2021), BPH, presents to the ED for increasing respiratory effort. Wife at bedside also providing information. Pt with SOB, increased respiratory effort, and cough since last Friday attributed to high humidity. Pt was feeling better for few days and since yesterday morning noticed again increasing symptoms, associate to orthopnea. No fever. Pt's wife gave him dual NB, prednisone 15 mg (yesterday and today) and put on the Bipap during the day with mild improvement. Wife also reports LE edema over the last week. Pt denies headache, dizziness, sore throat, chest pain palpitations, abdominal pain, dysuria.    *Last pulm john was 6/25/22. Pt was stable. Continued with steroid taper.   Pt has been off prednisone for almost 1 month.     ED course:  - Vitals:  /78, HR 99, RR 18, temp 97.5, O2 Sat 99%  - Labs: WBC 11.12.  Normal INR.  BUN 25/Cr 1.3.  Lactate normal.  Troponin normal.  pBNP 504.  ABG 7.3/59/29/97.    - CXR: no significant changes seen from previous, pending official reading.   - EKG: NSR, 92 bpm.   - s/p Duoneb x1, Solumedrol 125 mg IVP x1

## 2022-08-04 NOTE — ED PROVIDER NOTE - OBJECTIVE STATEMENT
82-year-old male with past medical history of COPD, on home oxygen as needed, BiPAP overnight, coronary artery disease with CABG, cardiac stents, type 2 diabetes, hyperlipidemia, hypertension, on Eliquis presents to the emergency department by ambulance with report of difficulty breathing, patient states he felt difficulty breathing last night, wheezing, cough, given BiPAP, oxygen, nebulizer treatments, with little effect and came to the ER.

## 2022-08-04 NOTE — H&P ADULT - PROBLEM SELECTOR PLAN 6
- EKG with NSR  - Continue home Eliquis   - Continue home metoprolol with hold parameters  - Tele monitor.

## 2022-08-04 NOTE — H&P ADULT - NSHPOUTPATIENTPROVIDERS_GEN_ALL_CORE
PCP Dr. Sarah Avila   Pulbriseyda Marcelino  Cardio Dr. Rod  Endocrine Dr. Cherry   Vascular Dr. Marcio العلي

## 2022-08-04 NOTE — H&P ADULT - NSHPPHYSICALEXAM_GEN_ALL_CORE
T(C): 36.4 (08-04-22 @ 13:09), Max: 36.4 (08-04-22 @ 13:09)  HR: 99 (08-04-22 @ 13:09) (99 - 99)  BP: 159/78 (08-04-22 @ 13:09) (159/78 - 159/78)  RR: 18 (08-04-22 @ 13:09) (18 - 18)  SpO2: 99% (08-04-22 @ 13:09) (99% - 99%)    GENERAL: patient is obese, mild respiratory distress with O2 by NC, appropriately interactive  EYES: sclera clear, no exudates  ENMT: oropharynx clear, moist oral mucous.  NECK: supple, soft  LUNGS: generalized decreased respiratory sounds, b/l wheezing.   HEART: S1,S2. Regular rate and rhythm, no murmurs noted.   ABDOMEN: bowel sounds present. Soft, nontender, nondistended.   NEUROLOGIC: awake, alert, oriented x3, mobilize thee 4 extremities.   EXTREMITIES: no clubbing or cyanosis, mild - distal LE edema.   SKIN: warm, appears well perfused T(C): 36.4 (08-04-22 @ 13:09), Max: 36.4 (08-04-22 @ 13:09)  HR: 99 (08-04-22 @ 13:09) (99 - 99)  BP: 159/78 (08-04-22 @ 13:09) (159/78 - 159/78)  RR: 18 (08-04-22 @ 13:09) (18 - 18)  SpO2: 99% (08-04-22 @ 13:09) (99% - 99%)    GENERAL: patient is obese, mild respiratory distress with O2 by NC, appropriately interactive  EYES: sclera clear, no exudates  ENMT: oropharynx clear, moist oral mucous.  NECK: supple, soft  LUNGS: generalized decreased respiratory sounds, b/l wheezing.   HEART: S1,S2. Regular rate and rhythm, no murmurs noted.   ABDOMEN: bowel sounds present. Soft, nontender, nondistended.   NEUROLOGIC: awake, alert, oriented x3, able to mobilize the 4 extremities.   EXTREMITIES: no clubbing or cyanosis, mild - distal LE edema.   SKIN: warm, appears well perfused

## 2022-08-04 NOTE — H&P ADULT - ATTENDING COMMENTS
Pt is 81 yo male with PMH of asthma and COPD with chronic hypoxic/hypercapnia (on home O2 prn and nocturnal BIPAP), CAD (s/p 3v CABG 2004, and PCI w/stents in 2019 and 2021), HTN, pAfib (on Eliquis), HLD, T2DM, PVD (s/p stenting in b/l legs), L femur chronic osteomyelitis (s/p intramedullary abscess drainage 12/2021), BPH, presents to the ED for increasing respiratory effort.  Admitted for hypercapnic resp failure 2/2 COPD exacerbation    admit to F. BiPAP qhs ordered (same settings as home settings). IV steroids. ?component of heart failure given LE edema, will trial one dose of lasix to see response. f/u VBG in AM. pulm consulted. anticipate sugars will increase being on IV steroids. may need to add standing insulin while on IV steroids. pt is at high risk of acute decompensation given his medical history and comorbidities.

## 2022-08-04 NOTE — H&P ADULT - NSHPSOCIALHISTORY_GEN_ALL_CORE
Lives at home with wife  Able to ambulate independently, uses cane prn and O2 to go outside the house   Independent with most ADLs, sometimes needs assistance from his wife.  Former smoker (20 pack-year hx), quit 30 years ago.   Denies alcohol use.  Former frequent marijuana user (smoking), quit 3 years ago.   Covid vacine Moderna x2 and booster 11/2021.

## 2022-08-04 NOTE — H&P ADULT - PROBLEM SELECTOR PLAN 2
- Pt with h/o mixed asthma/COPD, last admission with exacerbation on 5/2022. After discharge pt was stable until now. Using O2 and BIPAP at home   - s/p prednisone 15 mg PO yesterday and today at home.  - s/p Duoneb x1, Solumedrol 125 mg IVP x1  - Continue Duonebs q6h  - Continue Symbicort and Spiriva for medication interchange with home meds.  - VBG with morning labs.  - Pulm consult - Pt with h/o mixed asthma/COPD, last admission with exacerbation on 5/2022. After discharge pt was stable until now. Using O2 and BIPAP at home   - s/p prednisone 15 mg PO yesterday and today at home.  - s/p Duoneb x1, Solumedrol 125 mg IVP x1 in the ED   - Continue Duonebs q6h  - Continue Symbicort and Spiriva for medication interchange with home meds.  - Continue supplemental O2 prn and night time BIPAP (18/8)   - VBG with morning labs.  - Pulm consult - Pt with h/o mixed asthma/COPD, last admission with exacerbation on 5/2022. After discharge pt was stable until now. Using O2 and BIPAP at home   - s/p prednisone 15 mg PO yesterday and today at home.  - s/p Duoneb x1, Solumedrol 125 mg IVP x1 in the ED   - Continue Duonebs q6h  - Continue Symbicort and Spiriva for medication interchange with home meds.  - Solumedrol 40mg IV q8h.   - Continue supplemental O2 prn and night time BIPAP (18/8)   - VBG with morning labs.  - Pulm consult

## 2022-08-04 NOTE — H&P ADULT - PROBLEM SELECTOR PLAN 4
- h/o CAD with CABG and PCI x2 times with stents.   - EKG with no acute changes, no signs or ischemia   - No recent or acute chest pain   - Continue home clopidogrel, statin, and metoprolol.

## 2022-08-04 NOTE — H&P ADULT - PROBLEM SELECTOR PLAN 3
- Chronic.  last A1c 6.9 (5/2022)  - Glucose 185 on admission - Chronic. Last A1c 6.9 (5/2022)  - Glucose 185 on admission  - At home on Novolog prn, and Jardiance  - Start lispro low dose correct. slid. scale  - Diabetic diet, hypoglycemia protocol and Accu-Cheks.   - f/u A1c for am. - Chronic. Last A1c 6.9 (5/2022)  - Glucose 185 on admission  - At home on Novolog prn, and Jardiance  - Start lispro low dose correct. slid. scale  - Diabetic diet, hypoglycemia protocol and Accu-Cheks.   - f/u A1c for am.   - anticipate that sugars will increase while he is on IV steroids and may need standing insulin

## 2022-08-04 NOTE — H&P ADULT - NSICDXPASTSURGICALHX_GEN_ALL_CORE_FT
PAST SURGICAL HISTORY:  CABG (Coronary Artery Bypass Graft) 2004    Compound fracture left leg    H/O drainage of abscess Left femur 12/2021    S/P primary angioplasty with coronary stent

## 2022-08-05 ENCOUNTER — TRANSCRIPTION ENCOUNTER (OUTPATIENT)
Age: 82
End: 2022-08-05

## 2022-08-05 VITALS
HEART RATE: 91 BPM | DIASTOLIC BLOOD PRESSURE: 77 MMHG | OXYGEN SATURATION: 93 % | RESPIRATION RATE: 18 BRPM | TEMPERATURE: 98 F | SYSTOLIC BLOOD PRESSURE: 142 MMHG

## 2022-08-05 LAB
A1C WITH ESTIMATED AVERAGE GLUCOSE RESULT: 7.8 % — HIGH (ref 4–5.6)
ANION GAP SERPL CALC-SCNC: 9 MMOL/L — SIGNIFICANT CHANGE UP (ref 5–17)
BASE EXCESS BLDV CALC-SCNC: 0.7 MMOL/L — SIGNIFICANT CHANGE UP (ref -2–3)
BLOOD GAS COMMENTS, VENOUS: SIGNIFICANT CHANGE UP
BUN SERPL-MCNC: 36 MG/DL — HIGH (ref 7–23)
CALCIUM SERPL-MCNC: 9.5 MG/DL — SIGNIFICANT CHANGE UP (ref 8.5–10.1)
CHLORIDE SERPL-SCNC: 104 MMOL/L — SIGNIFICANT CHANGE UP (ref 96–108)
CO2 SERPL-SCNC: 27 MMOL/L — SIGNIFICANT CHANGE UP (ref 22–31)
CREAT SERPL-MCNC: 1.7 MG/DL — HIGH (ref 0.5–1.3)
CRP SERPL-MCNC: 18 MG/L — HIGH
EGFR: 40 ML/MIN/1.73M2 — LOW
EOSINOPHIL # BLD: 0 /UL — LOW (ref 50–350)
ESTIMATED AVERAGE GLUCOSE: 177 MG/DL — HIGH (ref 68–114)
GLUCOSE SERPL-MCNC: 325 MG/DL — HIGH (ref 70–99)
HCO3 BLDV-SCNC: 28 MMOL/L — SIGNIFICANT CHANGE UP (ref 22–29)
HCT VFR BLD CALC: 45.6 % — SIGNIFICANT CHANGE UP (ref 39–50)
HGB BLD-MCNC: 13.8 G/DL — SIGNIFICANT CHANGE UP (ref 13–17)
MCHC RBC-ENTMCNC: 27.2 PG — SIGNIFICANT CHANGE UP (ref 27–34)
MCHC RBC-ENTMCNC: 30.3 GM/DL — LOW (ref 32–36)
MCV RBC AUTO: 89.9 FL — SIGNIFICANT CHANGE UP (ref 80–100)
NRBC # BLD: 0 /100 WBCS — SIGNIFICANT CHANGE UP (ref 0–0)
PCO2 BLDV: 60 MMHG — HIGH (ref 42–55)
PH BLDV: 7.27 — LOW (ref 7.32–7.43)
PLATELET # BLD AUTO: 226 K/UL — SIGNIFICANT CHANGE UP (ref 150–400)
PO2 BLDV: 70 MMHG — HIGH (ref 25–45)
POTASSIUM SERPL-MCNC: 4.6 MMOL/L — SIGNIFICANT CHANGE UP (ref 3.5–5.3)
POTASSIUM SERPL-SCNC: 4.6 MMOL/L — SIGNIFICANT CHANGE UP (ref 3.5–5.3)
RBC # BLD: 5.07 M/UL — SIGNIFICANT CHANGE UP (ref 4.2–5.8)
RBC # FLD: 13.6 % — SIGNIFICANT CHANGE UP (ref 10.3–14.5)
SAO2 % BLDV: 95 % — HIGH (ref 67–88)
SODIUM SERPL-SCNC: 140 MMOL/L — SIGNIFICANT CHANGE UP (ref 135–145)
WBC # BLD: 9.11 K/UL — SIGNIFICANT CHANGE UP (ref 3.8–10.5)
WBC # FLD AUTO: 9.11 K/UL — SIGNIFICANT CHANGE UP (ref 3.8–10.5)

## 2022-08-05 PROCEDURE — 83880 ASSAY OF NATRIURETIC PEPTIDE: CPT

## 2022-08-05 PROCEDURE — 80048 BASIC METABOLIC PNL TOTAL CA: CPT

## 2022-08-05 PROCEDURE — 0225U NFCT DS DNA&RNA 21 SARSCOV2: CPT

## 2022-08-05 PROCEDURE — 99221 1ST HOSP IP/OBS SF/LOW 40: CPT

## 2022-08-05 PROCEDURE — 82803 BLOOD GASES ANY COMBINATION: CPT

## 2022-08-05 PROCEDURE — 83036 HEMOGLOBIN GLYCOSYLATED A1C: CPT

## 2022-08-05 PROCEDURE — 85048 AUTOMATED LEUKOCYTE COUNT: CPT

## 2022-08-05 PROCEDURE — 85610 PROTHROMBIN TIME: CPT

## 2022-08-05 PROCEDURE — 85025 COMPLETE CBC W/AUTO DIFF WBC: CPT

## 2022-08-05 PROCEDURE — 99239 HOSP IP/OBS DSCHRG MGMT >30: CPT

## 2022-08-05 PROCEDURE — 85027 COMPLETE CBC AUTOMATED: CPT

## 2022-08-05 PROCEDURE — 83605 ASSAY OF LACTIC ACID: CPT

## 2022-08-05 PROCEDURE — 94660 CPAP INITIATION&MGMT: CPT

## 2022-08-05 PROCEDURE — 82962 GLUCOSE BLOOD TEST: CPT

## 2022-08-05 PROCEDURE — 93005 ELECTROCARDIOGRAM TRACING: CPT

## 2022-08-05 PROCEDURE — 96374 THER/PROPH/DIAG INJ IV PUSH: CPT

## 2022-08-05 PROCEDURE — 94760 N-INVAS EAR/PLS OXIMETRY 1: CPT

## 2022-08-05 PROCEDURE — 36415 COLL VENOUS BLD VENIPUNCTURE: CPT

## 2022-08-05 PROCEDURE — 71045 X-RAY EXAM CHEST 1 VIEW: CPT

## 2022-08-05 PROCEDURE — 82553 CREATINE MB FRACTION: CPT

## 2022-08-05 PROCEDURE — 80053 COMPREHEN METABOLIC PANEL: CPT

## 2022-08-05 PROCEDURE — 36600 WITHDRAWAL OF ARTERIAL BLOOD: CPT

## 2022-08-05 PROCEDURE — 83735 ASSAY OF MAGNESIUM: CPT

## 2022-08-05 PROCEDURE — 94640 AIRWAY INHALATION TREATMENT: CPT

## 2022-08-05 PROCEDURE — 99285 EMERGENCY DEPT VISIT HI MDM: CPT

## 2022-08-05 PROCEDURE — 86140 C-REACTIVE PROTEIN: CPT

## 2022-08-05 PROCEDURE — 85730 THROMBOPLASTIN TIME PARTIAL: CPT

## 2022-08-05 PROCEDURE — 87040 BLOOD CULTURE FOR BACTERIA: CPT

## 2022-08-05 PROCEDURE — 84484 ASSAY OF TROPONIN QUANT: CPT

## 2022-08-05 PROCEDURE — 82785 ASSAY OF IGE: CPT

## 2022-08-05 RX ORDER — INSULIN LISPRO 100/ML
VIAL (ML) SUBCUTANEOUS
Refills: 0 | Status: DISCONTINUED | OUTPATIENT
Start: 2022-08-05 | End: 2022-08-05

## 2022-08-05 RX ORDER — INSULIN GLARGINE 100 [IU]/ML
5 INJECTION, SOLUTION SUBCUTANEOUS AT BEDTIME
Refills: 0 | Status: DISCONTINUED | OUTPATIENT
Start: 2022-08-05 | End: 2022-08-05

## 2022-08-05 RX ORDER — INSULIN LISPRO 100/ML
VIAL (ML) SUBCUTANEOUS AT BEDTIME
Refills: 0 | Status: DISCONTINUED | OUTPATIENT
Start: 2022-08-05 | End: 2022-08-05

## 2022-08-05 RX ORDER — INSULIN ASPART 100 [IU]/ML
2 INJECTION, SOLUTION SUBCUTANEOUS
Qty: 0 | Refills: 0 | DISCHARGE

## 2022-08-05 RX ORDER — INSULIN LISPRO 100/ML
3 VIAL (ML) SUBCUTANEOUS
Refills: 0 | Status: DISCONTINUED | OUTPATIENT
Start: 2022-08-05 | End: 2022-08-05

## 2022-08-05 RX ADMIN — Medication 6: at 12:18

## 2022-08-05 RX ADMIN — Medication 10: at 16:43

## 2022-08-05 RX ADMIN — Medication 3 MILLILITER(S): at 00:44

## 2022-08-05 RX ADMIN — BUDESONIDE AND FORMOTEROL FUMARATE DIHYDRATE 2 PUFF(S): 160; 4.5 AEROSOL RESPIRATORY (INHALATION) at 08:26

## 2022-08-05 RX ADMIN — Medication 4: at 08:25

## 2022-08-05 RX ADMIN — AZITHROMYCIN 250 MILLIGRAM(S): 500 TABLET, FILM COATED ORAL at 12:18

## 2022-08-05 RX ADMIN — Medication 50 MILLIGRAM(S): at 16:46

## 2022-08-05 RX ADMIN — Medication 3 UNIT(S): at 16:43

## 2022-08-05 RX ADMIN — Medication 3 MILLILITER(S): at 07:59

## 2022-08-05 RX ADMIN — Medication 40 MILLIGRAM(S): at 05:27

## 2022-08-05 RX ADMIN — CLOPIDOGREL BISULFATE 75 MILLIGRAM(S): 75 TABLET, FILM COATED ORAL at 12:18

## 2022-08-05 RX ADMIN — APIXABAN 2.5 MILLIGRAM(S): 2.5 TABLET, FILM COATED ORAL at 16:46

## 2022-08-05 RX ADMIN — MONTELUKAST 10 MILLIGRAM(S): 4 TABLET, CHEWABLE ORAL at 12:17

## 2022-08-05 RX ADMIN — TIOTROPIUM BROMIDE 1 CAPSULE(S): 18 CAPSULE ORAL; RESPIRATORY (INHALATION) at 08:26

## 2022-08-05 RX ADMIN — Medication 3 MILLILITER(S): at 12:47

## 2022-08-05 RX ADMIN — APIXABAN 2.5 MILLIGRAM(S): 2.5 TABLET, FILM COATED ORAL at 05:27

## 2022-08-05 NOTE — CONSULT NOTE ADULT - SUBJECTIVE AND OBJECTIVE BOX
Patient is a 82y old  Male who presents with a chief complaint of asthma/COPD exacerbation (05 Aug 2022 11:58)    Type:2 DX steroid induced. a1c 7.8% BG lunch 415 mg/dL, treated with low scale. sw wife and patient- aware of treating elevated BG at home with Novolog when being treated with steroids. endo: Jo Ann next appt 8/14/22. they were preparing to be DSC- hold until repeat BG and AC dinner meal insulin given. Patient has all supplies needed. diabetes education provided- verbally/handouts.       HPI:  Pt is 83 yo male with PMH of asthma and COPD with chronic hypoxic/hypercapnia (on home O2 prn and nocturnal BIPAP), CAD (s/p 3v CABG 2004, and PCI w/stents in 2019 and 2021), HTN, pAfib (on Eliquis), HLD, T2DM, PVD (s/p stenting in b/l legs), L femur chronic osteomyelitis (s/p intramedullary abscess drainage 12/2021), BPH, presents to the ED for increasing respiratory effort. Wife at bedside also providing information. Pt with SOB, increased respiratory effort, and cough since last Friday attributed to high humidity. Pt was feeling better for few days and since yesterday morning noticed again increasing symptoms, associate to orthopnea. No fever. Pt's wife gave him dual NB, prednisone 15 mg (yesterday and today) and put on the Bipap during the day with mild improvement. Wife also reports LE edema over the last week. Pt denies headache, dizziness, sore throat, chest pain palpitations, abdominal pain, dysuria.    *Last pulm john was 6/25/22. Pt was stable. Continued with steroid taper.   Pt has been off prednisone for almost 1 month.     ED course:  - Vitals:  /78, HR 99, RR 18, temp 97.5, O2 Sat 99%  - Labs: WBC 11.12.  Normal INR.  BUN 25/Cr 1.3.  Lactate normal.  Troponin normal.  pBNP 504.  ABG 7.3/59/29/97.    - CXR: no significant changes seen from previous, pending official reading.   - EKG: NSR, 92 bpm.   - s/p Duoneb x1, Solumedrol 125 mg IVP x1 (04 Aug 2022 18:22)      PAST MEDICAL & SURGICAL HISTORY:  Diabetes Mellitus, Type II      CAD (Coronary Artery Disease)  s/p 3v CABG 2004; stents placed in [place] in 2019      Dyslipidemia      Osteomyelitis      COPD (chronic obstructive pulmonary disease)  on 2L at home and BiPAP at night; intubated 6/18      Hypertension      PVD (peripheral vascular disease)      History of PAT (paroxysmal atrial tachycardia)      Asthma with COPD      BPH (benign prostatic hyperplasia)      CABG (Coronary Artery Bypass Graft)  2004      Compound fracture  left leg      S/P primary angioplasty with coronary stent      H/O drainage of abscess  Left femur 12/2021          REVIEW OF SYSTEMS  General:	as above  Respiratory: NAD, No SOB, no cough  Cardiovascular: No chest pain, no palpitations	  Endocrine: no polyuria, no polydipsia, or S/S of hypoglycemia        Allergies    No Known Allergies    Intolerances    shellfish (Nausea)      MEDICATIONS  (STANDING):  albuterol/ipratropium for Nebulization 3 milliLiter(s) Nebulizer every 6 hours  apixaban 2.5 milliGRAM(s) Oral two times a day  atorvastatin 80 milliGRAM(s) Oral at bedtime  azithromycin   Tablet 250 milliGRAM(s) Oral <User Schedule>  budesonide 160 MICROgram(s)/formoterol 4.5 MICROgram(s) Inhaler 2 Puff(s) Inhalation two times a day  clopidogrel Tablet 75 milliGRAM(s) Oral daily  dextrose 5%. 1000 milliLiter(s) (100 mL/Hr) IV Continuous <Continuous>  dextrose 5%. 1000 milliLiter(s) (50 mL/Hr) IV Continuous <Continuous>  dextrose 50% Injectable 25 Gram(s) IV Push once  dextrose 50% Injectable 12.5 Gram(s) IV Push once  dextrose 50% Injectable 25 Gram(s) IV Push once  glucagon  Injectable 1 milliGRAM(s) IntraMuscular once  insulin glargine Injectable (LANTUS) 5 Unit(s) SubCutaneous at bedtime  insulin lispro (ADMELOG) corrective regimen sliding scale   SubCutaneous three times a day before meals  insulin lispro (ADMELOG) corrective regimen sliding scale   SubCutaneous at bedtime  insulin lispro Injectable (ADMELOG) 3 Unit(s) SubCutaneous three times a day before meals  metoprolol tartrate 50 milliGRAM(s) Oral every 12 hours  montelukast 10 milliGRAM(s) Oral daily  predniSONE   Tablet 20 milliGRAM(s) Oral daily  tamsulosin 0.4 milliGRAM(s) Oral at bedtime  tiotropium 18 MICROgram(s) Capsule 1 Capsule(s) Inhalation daily      
Date/Time Patient Seen:  		  Referring MD:   Data Reviewed	       Patient is a 82y old  Male who presents with a chief complaint of     Subjective/HPI  vs noted  labs reviewed  h and p reviewed  er provider note reviewed  known to me from prior admissions  follows with pulm - [facility] team as outpatient  on nippv at night time for jacy - copd -       History of Present Illness:  History of Present Illness:   Pt is 83 yo male with PMH of asthma and COPD with chronic hypoxic/hypercapnia (on home O2 prn and nocturnal BIPAP), CAD (s/p 3v CABG , and PCI w/stents in  and ), HTN, pAfib (on Eliquis), HLD, T2DM, PVD (s/p stenting in b/l legs), L femur chronic osteomyelitis (s/p intramedullary abscess drainage 2021), BPH, presents to the ED for increasing respiratory effort. Wife at bedside also providing information. Pt with SOB, increased respiratory effort, and cough since last Friday attributed to high humidity. Pt was feeling better for few days and since yesterday morning noticed again increasing symptoms, associate to orthopnea. No fever. Pt's wife gave him dual NB, prednisone 15 mg (yesterday and today) and put on the Bipap during the day with mild improvement. Wife also reports LE edema over the last week. Pt denies headache, dizziness, sore throat, chest pain palpitations, abdominal pain, dysuria.    *Last pulm john was 22. Pt was stable. Continued with steroid taper.   Pt has been off prednisone for almost 1 month.   PAST MEDICAL & SURGICAL HISTORY:  Diabetes Mellitus, Type II    CAD (Coronary Artery Disease)  s/p 3v CABG ; stents placed in [place] in     Dyslipidemia    Asymptomatic Peripheral Vascular Disease    Osteomyelitis    COPD (chronic obstructive pulmonary disease)  on 2L at home and BiPAP at night; intubated     Diabetes mellitus    Hypertension    PVD (peripheral vascular disease)    History of PAT (paroxysmal atrial tachycardia)    Asthma with COPD    BPH (benign prostatic hyperplasia)    CABG (Coronary Artery Bypass Graft)      Compound fracture  left leg    S/P primary angioplasty with coronary stent    H/O drainage of abscess  Left femur 2021          Medication list         MEDICATIONS  (STANDING):  albuterol/ipratropium for Nebulization 3 milliLiter(s) Nebulizer every 6 hours  apixaban 2.5 milliGRAM(s) Oral two times a day  atorvastatin 80 milliGRAM(s) Oral at bedtime  azithromycin   Tablet 250 milliGRAM(s) Oral <User Schedule>  budesonide 160 MICROgram(s)/formoterol 4.5 MICROgram(s) Inhaler 2 Puff(s) Inhalation two times a day  clopidogrel Tablet 75 milliGRAM(s) Oral daily  furosemide   Injectable 40 milliGRAM(s) IV Push once  methylPREDNISolone sodium succinate Injectable 40 milliGRAM(s) IV Push every 8 hours  metoprolol tartrate 50 milliGRAM(s) Oral every 12 hours  montelukast 10 milliGRAM(s) Oral daily  tamsulosin 0.4 milliGRAM(s) Oral at bedtime  tiotropium 18 MICROgram(s) Capsule 1 Capsule(s) Inhalation daily    MEDICATIONS  (PRN):  acetaminophen     Tablet .. 650 milliGRAM(s) Oral every 6 hours PRN Temp greater or equal to 38C (100.4F), Mild Pain (1 - 3)  melatonin 3 milliGRAM(s) Oral at bedtime PRN Insomnia         Vitals log        ICU Vital Signs Last 24 Hrs  T(C): 36.4 (04 Aug 2022 13:09), Max: 36.4 (04 Aug 2022 13:09)  T(F): 97.5 (04 Aug 2022 13:09), Max: 97.5 (04 Aug 2022 13:09)  HR: 99 (04 Aug 2022 13:09) (99 - 99)  BP: 159/78 (04 Aug 2022 13:09) (159/78 - 159/78)  BP(mean): --  ABP: --  ABP(mean): --  RR: 18 (04 Aug 2022 13:09) (18 - 18)  SpO2: 99% (04 Aug 2022 13:09) (99% - 99%)    O2 Parameters below as of 04 Aug 2022 13:09  Patient On (Oxygen Delivery Method): room air                 Input and Output:  I&O's Detail      Lab Data                        14.6   11.12 )-----------( 238      ( 04 Aug 2022 15:23 )             47.7     08-04    141  |  106  |  25<H>  ----------------------------<  185<H>  4.4   |  27  |  1.30    Ca    9.3      04 Aug 2022 15:23  Mg     2.2     -    TPro  7.2  /  Alb  3.6  /  TBili  0.4  /  DBili  x   /  AST  38<H>  /  ALT  42  /  AlkPhos  102      ABG - ( 04 Aug 2022 14:35 )  pH, Arterial: 7.30  pH, Blood: x     /  pCO2: 59    /  pO2: 97    / HCO3: 29    / Base Excess: 2.6   /  SaO2: 98.4          AMILY HISTORY:  Family hx of lung cancer, brother,  age 82, used to smoke with pt    Sibling  Still living? Unknown  Family history of diabetes mellitus, Age at diagnosis: Age Unknown.     Social History:  Substance use: No  Social History (marital status, living situation, occupation, and sexual history): Lives at home with wife  Able to ambulate independently, uses cane prn and O2 to go outside the house   Independent with most ADLs, sometimes needs assistance from his wife.  Former smoker (20 pack-year hx), quit 30 years ago.   Denies alcohol use.  Former frequent marijuana user (smoking), quit 3 years ago.   Covid vacine Moderna x2 and booster 2021.     Tobacco Screening:  · Core Measure Site	Yes  · Has the patient used tobacco in the past 30 days?	No    Risk Assessment:    Present on Admission:  Deep Venous Thrombosis	no  Pulmonary Embolus	no      CARDIAC MARKERS ( 04 Aug 2022 15:23 )  x     / x     / x     / x     / 3.8 ng/mL        Review of Systems	  sob  rhodes  weakness      Objective     Physical Examination      heart s1s2  lung dc BS  abd soft  head nc  chest symmetric    Pertinent Lab findings & Imaging      Gonzáles:  NO   Adequate UO     I&O's Detail           Discussed with:     Cultures:	        Radiology            ACC: [id number] EXAM:  XR CHEST PORTABLE IMMED 1V                          PROCEDURE DATE:  2022          INTERPRETATION:  INDICATION: Shortness of Breath    PRIORS: 2021    VIEWS: Portable AP radiography of the chest performed.    FINDINGS: Heart size appears within normal limits. Status post   sternotomy. Arterial deficiency noted within the upper lung fields and   flattening of the diaphragm suggest COPD/emphysema. There is a suggestion   for nodular opacities noted overlying the upper lung fields on this   limited portable radiograph, indeterminate. Follow-up PA and lateral   radiography of the chest recommended. No evidence for lobar   consolidation. No pleural effusion. No pneumothorax. No mediastinal   shift. No acute osseous fractures.    IMPRESSION: Arterial deficiency noted within the upper lung fields and   flattening of the diaphragm suggest COPD/emphysema. There is a suggestion   for nodular opacities noted overlying the upper lung fields on this   limited portable radiograph, indeterminate. Follow-up PA and lateral   radiography of the chest recommended.    --- End of Report ---            ELEAZAR CASTAÑEDA MD; Attending Radiologist  This document has been electronically signed. May 25 2022  8:57AM

## 2022-08-05 NOTE — DISCHARGE NOTE PROVIDER - NSDCCPCAREPLAN_GEN_ALL_CORE_FT
PRINCIPAL DISCHARGE DIAGNOSIS  Diagnosis: COPD exacerbation  Assessment and Plan of Treatment: Continue steroids as prescribed.  Continue all of your inhalers as prescribed.  Use BiPAP as ordered by your pulmonologist.  Follow up with your pulmonologist within one week.      SECONDARY DISCHARGE DIAGNOSES  Diagnosis: Stage 3 chronic kidney disease  Assessment and Plan of Treatment: Continue your home meds as previously prescribed.  Follow up with your PCP and specialists.    Diagnosis: CAD (coronary artery disease)  Assessment and Plan of Treatment: Continue your home meds as previously prescribed.  Follow up with your PCP and specialists.    Diagnosis: Type 2 diabetes mellitus  Assessment and Plan of Treatment: Continue your home meds as previously prescribed.  Follow up with your PCP and specialists.    Diagnosis: BPH (benign prostatic hyperplasia)  Assessment and Plan of Treatment: Continue your home meds as previously prescribed.  Follow up with your PCP and specialists.    Diagnosis: PAD (peripheral artery disease)  Assessment and Plan of Treatment: Continue your home meds as previously prescribed.  Follow up with your PCP and specialists.    Diagnosis: Paroxysmal atrial fibrillation  Assessment and Plan of Treatment: Continue your home meds as previously prescribed.  Follow up with your PCP and specialists.

## 2022-08-05 NOTE — CONSULT NOTE ADULT - PROBLEM SELECTOR RECOMMENDATION 9
Type 2 A1c 7.8% adm: COPD exacerbation   FU appt: Dr Cherry  all labs printed and given to wife  DSC recommendations: return to home regimen and glucose monitoring  diabetes education provided  Diabetes support info and cell # [phone] given   Goal 100-180 mg/dL; 140-180 mg/dL in critical care areas

## 2022-08-05 NOTE — PROVIDER CONTACT NOTE (OTHER) - ACTION/TREATMENT ORDERED:
administer insulin by the new sliding scale   after the dinner, pt can be discharged
okay to give another tray and continue monitoring sugar per plan of care

## 2022-08-05 NOTE — PROVIDER CONTACT NOTE (OTHER) - SITUATION
blood sugar 352mg/dl
finished breakfast and asks another tray   has last dose solumedrol ivp   bs 313 and received insulin

## 2022-08-05 NOTE — DISCHARGE NOTE PROVIDER - ATTENDING DISCHARGE PHYSICAL EXAMINATION:
Vital Signs Last 24 Hrs  T(C): 36.4 (05 Aug 2022 13:19), Max: 37 (04 Aug 2022 23:48)  T(F): 97.5 (05 Aug 2022 13:19), Max: 98.6 (04 Aug 2022 23:48)  HR: 95 (05 Aug 2022 13:19) (72 - 108)  BP: 127/74 (05 Aug 2022 13:19) (96/59 - 159/83)  BP(mean): --  RR: 18 (05 Aug 2022 13:19) (17 - 18)  SpO2: 93% (05 Aug 2022 13:19) (92% - 100%)    Parameters below as of 05 Aug 2022 13:19  Patient On (Oxygen Delivery Method): room air    GENERAL: patient appears well, no acute distress, appropriate  EYES: sclera clear, no exudates, PERRLA  ENMT: oropharynx clear without erythema, no exudates, moist mucous membranes  NECK: supple, soft, no thyromegaly noted  LUNGS:  decreased breath sounds bilaterally, no wheeze noted  HEART: S1/S2, no murmurs noted, no lower extremity edema  GASTROINTESTINAL: abdomen is soft, nontender, nondistended, normoactive bowel sounds  INTEGUMENT: good skin turgor, warm, dry and intact  MUSCULOSKELETAL: no clubbing or cyanosis, no obvious deformity  NEUROLOGIC: awake, alert, oriented x3, strength no obvious sensory deficits  PSYCHIATRIC: mood is good, affect is congruent, linear and logical thought process

## 2022-08-05 NOTE — PROGRESS NOTE ADULT - SUBJECTIVE AND OBJECTIVE BOX
Date/Time Patient Seen:  		  Referring MD:   Data Reviewed	       Patient is a 82y old  Male who presents with a chief complaint of     Subjective/HPI     PAST MEDICAL & SURGICAL HISTORY:  Diabetes Mellitus, Type II    CAD (Coronary Artery Disease)  s/p 3v CABG 2004; stents placed in [place] in 2019    Dyslipidemia    Asymptomatic Peripheral Vascular Disease    Osteomyelitis    COPD (chronic obstructive pulmonary disease)  on 2L at home and BiPAP at night; intubated 6/18    Diabetes mellitus    Hypertension    PVD (peripheral vascular disease)    History of PAT (paroxysmal atrial tachycardia)    Asthma with COPD    BPH (benign prostatic hyperplasia)    CABG (Coronary Artery Bypass Graft)  2004    Compound fracture  left leg    S/P primary angioplasty with coronary stent    H/O drainage of abscess  Left femur 12/2021          Medication list         MEDICATIONS  (STANDING):  albuterol/ipratropium for Nebulization 3 milliLiter(s) Nebulizer every 6 hours  apixaban 2.5 milliGRAM(s) Oral two times a day  atorvastatin 80 milliGRAM(s) Oral at bedtime  azithromycin   Tablet 250 milliGRAM(s) Oral <User Schedule>  budesonide 160 MICROgram(s)/formoterol 4.5 MICROgram(s) Inhaler 2 Puff(s) Inhalation two times a day  clopidogrel Tablet 75 milliGRAM(s) Oral daily  dextrose 5%. 1000 milliLiter(s) (50 mL/Hr) IV Continuous <Continuous>  dextrose 5%. 1000 milliLiter(s) (100 mL/Hr) IV Continuous <Continuous>  dextrose 50% Injectable 25 Gram(s) IV Push once  dextrose 50% Injectable 12.5 Gram(s) IV Push once  dextrose 50% Injectable 25 Gram(s) IV Push once  glucagon  Injectable 1 milliGRAM(s) IntraMuscular once  insulin lispro (ADMELOG) corrective regimen sliding scale   SubCutaneous three times a day before meals  insulin lispro (ADMELOG) corrective regimen sliding scale   SubCutaneous at bedtime  methylPREDNISolone sodium succinate Injectable 40 milliGRAM(s) IV Push every 8 hours  metoprolol tartrate 50 milliGRAM(s) Oral every 12 hours  montelukast 10 milliGRAM(s) Oral daily  tamsulosin 0.4 milliGRAM(s) Oral at bedtime  tiotropium 18 MICROgram(s) Capsule 1 Capsule(s) Inhalation daily    MEDICATIONS  (PRN):  acetaminophen     Tablet .. 650 milliGRAM(s) Oral every 6 hours PRN Temp greater or equal to 38C (100.4F), Mild Pain (1 - 3)  dextrose Oral Gel 15 Gram(s) Oral once PRN Blood Glucose LESS THAN 70 milliGRAM(s)/deciliter  melatonin 3 milliGRAM(s) Oral at bedtime PRN Insomnia         Vitals log        ICU Vital Signs Last 24 Hrs  T(C): 36.2 (05 Aug 2022 04:45), Max: 37 (04 Aug 2022 23:48)  T(F): 97.2 (05 Aug 2022 04:45), Max: 98.6 (04 Aug 2022 23:48)  HR: 97 (05 Aug 2022 04:45) (73 - 108)  BP: 96/59 (05 Aug 2022 04:45) (96/59 - 159/83)  BP(mean): --  ABP: --  ABP(mean): --  RR: 18 (05 Aug 2022 04:45) (17 - 18)  SpO2: 98% (05 Aug 2022 04:45) (95% - 100%)    O2 Parameters below as of 05 Aug 2022 04:45  Patient On (Oxygen Delivery Method): BiPAP/CPAP                 Input and Output:  I&O's Detail      Lab Data                        14.6   11.12 )-----------( 238      ( 04 Aug 2022 15:23 )             47.7     08-04    141  |  106  |  25<H>  ----------------------------<  185<H>  4.4   |  27  |  1.30    Ca    9.3      04 Aug 2022 15:23  Mg     2.2     08-04    TPro  7.2  /  Alb  3.6  /  TBili  0.4  /  DBili  x   /  AST  38<H>  /  ALT  42  /  AlkPhos  102  08-04    ABG - ( 04 Aug 2022 14:35 )  pH, Arterial: 7.30  pH, Blood: x     /  pCO2: 59    /  pO2: 97    / HCO3: 29    / Base Excess: 2.6   /  SaO2: 98.4              CARDIAC MARKERS ( 04 Aug 2022 15:23 )  x     / x     / x     / x     / 3.8 ng/mL        Review of Systems	      Objective     Physical Examination    heart s1s2  lung dec BS  abd soft      Pertinent Lab findings & Imaging      Gonzáles:  NO   Adequate UO     I&O's Detail           Discussed with:     Cultures:	        Radiology

## 2022-08-05 NOTE — PROGRESS NOTE ADULT - ASSESSMENT
81 yo male with PMH of asthma and COPD with chronic hypoxic/hypercapnia (on home O2 prn and nocturnal BIPAP), CAD (s/p 3v CABG 2004, and PCI w/stents in 2019 and 2021), HTN, pAfib (on Eliquis), HLD, T2DM, PVD (s/p stenting in b/l legs), L femur chronic osteomyelitis (s/p intramedullary abscess drainage 12/2021), BPH, presents to the ED for increasing respiratory effort    eosinophilic Asthma - COPD - overlap - follows with Dr Cat in the office - on Mepolizumab injections and Steroids - Inhaler rx regimen  eval for Obstructive Airway Disease Exacerbation - ABG noted - NIPPV night time - prn use - Systemic Steroids - Bronchodilators  imaging - labs - vs - old records reviewed  on lasix - hx of HFPEF -   outpatient Pulm Records reviewed  monitor VS and HD  tele monitor  GOC discussion  keep sat > 88 pct  on AC for AF - rate and rhythm control  DM care - serial FS - anticipate spike in FS - pt on high dose Systemic Steroids - will titrate judiciously  zithro - as per outpatient schedule   BPH - monitor for LUTS

## 2022-08-05 NOTE — CONSULT NOTE ADULT - ASSESSMENT
Physical Exam:   Vital Signs Last 24 Hrs  T(C): 36.4 (05 Aug 2022 13:19), Max: 37 (04 Aug 2022 23:48)  T(F): 97.5 (05 Aug 2022 13:19), Max: 98.6 (04 Aug 2022 23:48)  HR: 95 (05 Aug 2022 13:19) (72 - 108)  BP: 127/74 (05 Aug 2022 13:19) (96/59 - 159/83)  BP(mean): --  RR: 18 (05 Aug 2022 13:19) (17 - 18)  SpO2: 93% (05 Aug 2022 13:19) (92% - 100%)    Parameters below as of 05 Aug 2022 13:19  Patient On (Oxygen Delivery Method): room air        General: NAD, denies Fever, chills  CVS: S1S2 no M/R/G  Resp: CTA in all fields  : no freq, no urgency, no dysuria           CAPILLARY BLOOD GLUCOSE      POCT Blood Glucose.: 412 mg/dL (05 Aug 2022 11:39)  POCT Blood Glucose.: 313 mg/dL (05 Aug 2022 07:31)  POCT Blood Glucose.: 226 mg/dL (04 Aug 2022 21:46)      Cholesterol, Serum: 113 mg/dL (05.19.21 @ 08:36)     HDL Cholesterol, Serum: 22 mg/dL (05.19.21 @ 08:36)     LDL Cholesterol Calculated: 66 mg/dL (05.19.21 @ 08:36)     DIET: CC  >50%        
83 yo male with PMH of asthma and COPD with chronic hypoxic/hypercapnia (on home O2 prn and nocturnal BIPAP), CAD (s/p 3v CABG 2004, and PCI w/stents in 2019 and 2021), HTN, pAfib (on Eliquis), HLD, T2DM, PVD (s/p stenting in b/l legs), L femur chronic osteomyelitis (s/p intramedullary abscess drainage 12/2021), BPH, presents to the ED for increasing respiratory effort    eosinophilic Asthma - COPD - overlap - follows with Dr Cat in the office - on Mepolizumab injections and Steroids - Inhaler rx regimen  eval for Obstructive Airway Disease Exacerbation - ABG noted - NIPPV night time - prn use - Systemic Steroids - Bronchodilators  imaging - labs - vs - old records reviewed  on lasix - hx of HFPEF -   outpatient Pulm Records reviewed  monitor VS and HD  tele monitor  GOC discussion  keep sat > 88 pct  on AC for AF - rate and rhythm control  DM care - serial FS - anticipate spike in FS - pt on high dose Systemic Steroids - will titrate judiciously  zithro - as per outpatient schedule   BPH - monitor for LUTS

## 2022-08-05 NOTE — DISCHARGE NOTE PROVIDER - NSDCMRMEDTOKEN_GEN_ALL_CORE_FT
apixaban 2.5 mg oral tablet: 1 tab(s) orally every 12 hours  azithromycin 250 mg oral tablet: 1 tab(s) orally Monday, Wednesday, and Friday  Breo Ellipta 200 mcg-25 mcg/inh inhalation powder: 1 puff(s) inhaled once a day  clopidogrel 75 mg oral tablet: 1 tab(s) orally once a day  CoQ10 100 mg oral capsule: 1 cap(s) orally once a day  Fish Oil oral capsule: 1 cap(s) orally once a day  Incruse Ellipta 62.5 mcg/inh inhalation powder: 1 puff(s) inhaled every 24 hours  Jardiance 25 mg oral tablet: 1 tab(s) orally once a day (in the morning)  metoprolol tartrate 50 mg oral tablet: 1 tab(s) orally every 12 hours  montelukast 10 mg oral tablet: 1 tab(s) orally once a day  NovoLOG 100 units/mL subcutaneous solution: 2 unit(s) subcutaneous once a day, As Needed    2-4 Units prn. usually once or twice/day, not every day  Nucala 100 mg subcutaneous injection: 100 milligram(s) subcutaneous every 4 weeks    *Next dose due 6/3/22*  predniSONE 20 mg oral tablet: 1 tab(s) orally once a day  rosuvastatin 20 mg oral tablet: 1 tab(s) orally once a day (at bedtime)  tamsulosin 0.4 mg oral capsule: 1 cap(s) orally once a day (at bedtime)  Turmeric 500 mg oral capsule: 1 cap(s) orally once a day  Vitamin B Complex 100: 1 tab(s) orally once a day  Vitamin C: 1 tab(s) orally once a day  Vitamin D3: 1 tab(s) orally once a day

## 2022-08-05 NOTE — DISCHARGE NOTE PROVIDER - HOSPITAL COURSE
ADMISSION HPI:  Pt is 81 yo male with PMH of asthma and COPD with chronic hypoxic/hypercapnia (on home O2 prn and nocturnal BIPAP), CAD (s/p 3v CABG 2004, and PCI w/stents in 2019 and 2021), HTN, pAfib (on Eliquis), HLD, T2DM, PVD (s/p stenting in b/l legs), L femur chronic osteomyelitis (s/p intramedullary abscess drainage 12/2021), BPH, presents to the ED for increasing respiratory effort. Wife at bedside also providing information. Pt with SOB, increased respiratory effort, and cough since last Friday attributed to high humidity. Pt was feeling better for few days and since yesterday morning noticed again increasing symptoms, associate to orthopnea. No fever. Pt's wife gave him dual NB, prednisone 15 mg (yesterday and today) and put on the Bipap during the day with mild improvement. Wife also reports LE edema over the last week. Pt denies headache, dizziness, sore throat, chest pain palpitations, abdominal pain, dysuria.    *Last pulm john was 6/25/22. Pt was stable. Continued with steroid taper.   Pt has been off prednisone for almost 1 month.     ED course:  - Vitals:  /78, HR 99, RR 18, temp 97.5, O2 Sat 99%  - Labs: WBC 11.12.  Normal INR.  BUN 25/Cr 1.3.  Lactate normal.  Troponin normal.  pBNP 504.  ABG 7.3/59/29/97.    - CXR: no significant changes seen from previous, pending official reading.   - EKG: NSR, 92 bpm.   - s/p Duoneb x1, Solumedrol 125 mg IVP x1 (04 Aug 2022 18:22)    HOSPITAL COURSE:  Patient was admitted for acute on chronic respiratory with hypoxia/hypercapnia. He was treated with IV steroids, inhaled steroids, inhaled bronchodilators, supplemental oxygen, and he was also continued on his chronic Azithromycin. He was evaluated by pulmonology Dr. Combs. He showed rapid improvement in symptoms, and his steroids were tapered to Prednisone 20 mg daily which he will continue for 4 days. He was cleared for discharge home. He will follow up closely with his pulmonologist.

## 2022-08-05 NOTE — DISCHARGE NOTE NURSING/CASE MANAGEMENT/SOCIAL WORK - NSDCVIVACCINE_GEN_ALL_CORE_FT
influenza, injectable, quadrivalent, preservative free; 18-Oct-2019 13:33; Faiza Mclaughlin (RN); [employer]; [id number] (Exp. Date: 30-Jun-2020); IntraMuscular; Deltoid Left.; 0.5 milliLiter(s); VIS (VIS Published: 15-Aug-2019, VIS Presented: 18-Oct-2019);   influenza, injectable, quadrivalent, preservative free; 26-Sep-2020 15:14; Cory Crooks (RN); [employer]; [id number] (Exp. Date: 30-Jun-2021); IntraMuscular; Deltoid Left.; 0.5 milliLiter(s); VIS (VIS Published: 15-Aug-2019, VIS Presented: 26-Sep-2020);   influenza, injectable, quadrivalent, preservative free; 06-Sep-2021 18:22; Aaron Zavala (RACHEL); [employer]; [id number] (Exp. Date: 30-Jun-2022); IntraMuscular; Deltoid Left.; 0.5 milliLiter(s); VIS (VIS Published: 15-Aug-2019, VIS Presented: 06-Sep-2021);

## 2022-08-05 NOTE — DISCHARGE NOTE PROVIDER - CARE PROVIDER_API CALL
Oscar Marcelino)  Critical Care Medicine; Pulmonary Disease  [address], [address]  [place], [place] [id number]  Phone: [phone]  Fax: [phone]  Follow Up Time: 1 week    Sarah Avila)  Internal Medicine  [address] [place] [address]  Phone: [phone]  Fax: [phone]  Follow Up Time: 1 week

## 2022-08-05 NOTE — DISCHARGE NOTE NURSING/CASE MANAGEMENT/SOCIAL WORK - PATIENT PORTAL LINK FT
You can access the FollowMyHealth Patient Portal offered by [facility] by registering at the following website: [email or web address]. By joining [facility]’s FollowMyHealth portal, you will also be able to view your health information using other applications (apps) compatible with our system.

## 2022-08-05 NOTE — DISCHARGE NOTE PROVIDER - NSDCFUSCHEDAPPT_GEN_ALL_CORE_FT
[facility]  VASCULAR [address]  Scheduled Appointment: 10/17/2022    Morro Buckley  [facility]  VASCULAR [address]  Scheduled Appointment: 10/17/2022

## 2022-08-05 NOTE — PATIENT PROFILE ADULT - FALL HARM RISK - HARM RISK INTERVENTIONS
Assistance with ambulation/Assistance OOB with selected safe patient handling equipment/Communicate Risk of Fall with Harm to all staff/Discuss with provider need for PT consult/Monitor gait and stability/Provide patient with walking aids - walker, cane, crutches/Reinforce activity limits and safety measures with patient and family/Sit up slowly, dangle for a short time, stand at bedside before walking/Tailored Fall Risk Interventions/Use of alarms - bed, chair and/or voice tab/Visual Cue: Yellow wristband and red socks/Bed in lowest position, wheels locked, appropriate side rails in place/Call bell, personal items and telephone in reach/Instruct patient to call for assistance before getting out of bed or chair/Non-slip footwear when patient is out of bed/[place] to call system/Physically safe environment - no spills, clutter or unnecessary equipment/Purposeful Proactive Rounding/Room/bathroom lighting operational, light cord in reach

## 2022-08-05 NOTE — CONSULT NOTE ADULT - CONSULT REASON
COURTNEY  COPD
82y A1C with Estimated Average Glucose Result: 7.8 % (08-05-22 @ 06:15)  A1C with Estimated Average Glucose Result: 6.9 % (05-26-22 @ 12:18)   diabetes mellitus uncontrolled type 2

## 2022-08-05 NOTE — DISCHARGE NOTE PROVIDER - PROVIDER TOKENS
PROVIDER:[TOKEN:[[id number]],FOLLOWUP:[1 week]],PROVIDER:[TOKEN:[[id number]],FOLLOWUP:[1 week]]

## 2022-08-09 LAB — IGE SERPL-ACNC: 80 KU/L — SIGNIFICANT CHANGE UP

## 2022-08-11 PROBLEM — N40.0 BENIGN PROSTATIC HYPERPLASIA WITHOUT LOWER URINARY TRACT SYMPTOMS: Chronic | Status: ACTIVE | Noted: 2022-08-04

## 2022-08-11 PROBLEM — J44.9 CHRONIC OBSTRUCTIVE PULMONARY DISEASE, UNSPECIFIED: Chronic | Status: ACTIVE | Noted: 2022-08-04

## 2022-08-18 NOTE — DIETITIAN INITIAL EVALUATION ADULT. - PROBLEM SELECTOR PROBLEM 8
Need for prophylactic measure Hemigard Intro: Due to skin fragility and wound tension, it was decided to use HEMIGARD adhesive retention suture devices to permit a linear closure. The skin was cleaned and dried for a 6cm distance away from the wound. Excessive hair, if present, was removed to allow for adhesion.

## 2022-08-30 NOTE — DISCHARGE NOTE NURSING/CASE MANAGEMENT/SOCIAL WORK - NSDCDPATPORTLINK_GEN_ALL_CORE
You can access the [facility] Portal, offered by [facility], by registering with the following website: [email or web address]
30-Aug-2022 17:00

## 2022-08-31 NOTE — ED PROVIDER NOTE - CHPI ED SYMPTOMS POS
Physical Therapy Visit    Referred by: SAMMY Dowd; Medical Diagnosis (from order):    Diagnosis Information      Diagnosis    332.0 (ICD-9-CM) - G20 (ICD-10-CM) - Parkinson's disease (CMS/[facility])              Visit: 7      Daily Treatment Note    SUBJECTIVE                                                                                                                 Patient reports over the past month, he and his wife took a trip to [place] to visit their son as well [place]. Reports having a good month overall until this past week. Reports having 2 falls when working outside or in the garage this past week. He reports he can feel when these falls are going to happen. No injury reported, however, is frustrated by these recent falls.        OBJECTIVE                                                                                                                          TREATMENT                                                                                                                  Therapeutic Exercise:  Elliptical  x 3'    Wall - posterior trunk lean/head to wall exercise 5\" holds x 10  Prone press-ups 5\" holds x 10  Standing  - BIG rock and reach x 10 - ea foot fwd    Therapeutic Activity:  Discussed the following:  - progress over the past month  - 2 falls over the past week, however, no falls at all for the weeks prior to that  - situations involving/surrounding the falls  - falling forward and momentum carrying forward  - difficulty with reactive stepping  - importance of upright posturing and comfort with extension to counteract forward posturing  - specific exercises for balance and extension posturing  - importance of endurance based exercise at the gym for LE strengthening and muscular endurance  - importance of confidence with mobility vs shakiness after fall  - reinforced freezing strategies and continued use of these strategies  - reinforced  cognitive training/challenges and continued use of these strategies    Skilled input: verbal instruction/cues and tactile instruction/cues    Writer verbally educated and received verbal consent for hand placement, positioning of patient, and techniques to be performed today from patient for therapist position for techniques as described above and how they are pertinent to the patient's plan of care.    Home Exercise Program/Education Materials:   [place]   Playing chess  500 piece puzzle  Targeted practice of turning in kitchen x 10 repetitions  Heel-toe specific gait    Access Code: [email or web address]  URL: [email or web address]  Date: 08/31/2022  Prepared by: Noemi Sonya    Exercises  · Static Prone on Elbows - 1 x daily - 7 x weekly - 1 sets - 10 reps - 5 sec hold  · Correct Standing Posture - Head to Wall exercise - 1 x daily - 7 x weekly - 1 sets - 10 reps - 5 sec hold    Standing BIG fwd rock and reach exercise             ASSESSMENT                                                                                                             Discussed postural exercises to reinforce upright posturing and extension based work. Also, added forward rock and reach exercise from [email or web address] program as a daily routine for balance. Patient demonstrates good technique with these exercises. Also, stressed the importance of continuing with current strategies for freezing and gait. Patient requests follow-up in 2 weeks to re-assess and therapist is in agreement with this plan. Encouraged daily compliance with updated HEP and more frequent workouts at the gym to work on increased heart rate with work load.   Patient/Caregiver Education:   Results of above outlined education: Verbalizes understanding and Demonstrates understanding    PLAN                                                                                                                           Suggestions for next session as indicated: Re-assess in 2  DIFFICULTY BREATHING/WHEEZING/DYSPNEA ON EXERTION/SHORTNESS OF BREATH weeks            Therapy procedure time and total treatment time can be found documented on the Time Entry flowsheet.

## 2022-09-07 ENCOUNTER — APPOINTMENT (OUTPATIENT)
Dept: PULMONOLOGY | Facility: CLINIC | Age: 82
End: 2022-09-07

## 2022-09-07 VITALS
RESPIRATION RATE: 18 BRPM | DIASTOLIC BLOOD PRESSURE: 83 MMHG | TEMPERATURE: 96.3 F | SYSTOLIC BLOOD PRESSURE: 146 MMHG | HEART RATE: 82 BPM | OXYGEN SATURATION: 98 %

## 2022-09-07 DIAGNOSIS — Z23 ENCOUNTER FOR IMMUNIZATION: ICD-10-CM

## 2022-09-07 PROCEDURE — G0008: CPT

## 2022-09-07 PROCEDURE — 90662 IIV NO PRSV INCREASED AG IM: CPT

## 2022-09-07 PROCEDURE — 99215 OFFICE O/P EST HI 40 MIN: CPT | Mod: 25

## 2022-09-07 RX ORDER — CIPROFLOXACIN HYDROCHLORIDE 500 MG/1
500 TABLET, FILM COATED ORAL
Qty: 60 | Refills: 1 | Status: COMPLETED | COMMUNITY
Start: 2022-03-25 | End: 2022-09-07

## 2022-09-07 RX ORDER — PREDNISONE 1 MG/1
1 TABLET ORAL
Qty: 150 | Refills: 0 | Status: COMPLETED | COMMUNITY
Start: 2022-03-01 | End: 2022-09-07

## 2022-09-07 RX ORDER — ASPIRIN 81 MG
81 TABLET, DELAYED RELEASE (ENTERIC COATED) ORAL
Refills: 0 | Status: COMPLETED | COMMUNITY
End: 2022-09-07

## 2022-09-07 RX ORDER — PREDNISONE 5 MG/1
5 TABLET ORAL AS DIRECTED
Refills: 0 | Status: COMPLETED | COMMUNITY
End: 2022-09-07

## 2022-09-19 NOTE — PATIENT PROFILE ADULT - FAMILY NEEDS
How Many Mls Were Removed From The 40 Mg/Ml (1ml) Vial When Preparing The Injectable Solution?: 0 Kenalog Preparation: Kenalog Validate Note Data When Using Inventory: Yes Lot # For Kenalog (Optional): [id number] Ndc# For Kenalog Only: 6169-8735-03 Consent: The risks of atrophy were reviewed with the patient. Expiration Date For Kenalog (Optional): FEB 2023 Administered By (Optional): Susana Snowden PA-C Total Volume (Ccs): 0.3 Detail Level: Detailed Show Inventory Tab: Hide Medical Necessity Clause: This procedure was medically necessary because the lesions that were treated were: Include Z78.9 (Other Specified Conditions Influencing Health Status) As An Associated Diagnosis?: No Concentration Of Kenalog Solution Injected (Mg/Ml): 20.0 no

## 2022-09-30 NOTE — ED ADULT NURSE NOTE - NS ED NOTE ABUSE SUSPICION NEGLECT YN
acetaminophen 500 mg oral tablet: 1 tab(s) orally every 8 hours, As Needed for mild to moderate pain   alendronate 70 mg oral tablet: 1 tab(s) orally once a week  aspirin 81 mg oral tablet, chewable: 1 tab(s) orally once a day  enoxaparin: 30 milligram(s) subcutaneous once a day  LAST DAY 9/30/2022  ferrous sulfate 325 mg oral delayed release tablet: 1 tab(s) orally once a day  Fleet Enema 19 g-7 g rectal enema: 133 milliliter(s) rectally once a day X3 DAY   STOP IF HAVING LOOSE BM   gabapentin 100 mg oral capsule: 1 tab(s) orally 2 times a day  metoprolol tartrate 25 mg oral tablet: 1 tab(s) orally 2 times a day  oxyCODONE 5 mg oral tablet: 1 tab(s) orally every 6 hours, As Needed for severe pain  polyethylene glycol 3350 oral powder for reconstitution: 17 gram(s) orally 2 times a day  senna leaf extract oral tablet: 2 tab(s) orally once a day (at bedtime)  simvastatin 40 mg oral tablet: 1 tab(s) orally once a day (at bedtime)  Vitamin B-100 oral tablet: 1 tab(s) orally once a day   No acetaminophen 500 mg oral tablet: 1 tab(s) orally every 8 hours, As Needed for mild to moderate pain   alendronate 70 mg oral tablet: 1 tab(s) orally once a week  ascorbic acid 500 mg oral tablet: 1 tab(s) orally once a day  aspirin 81 mg oral tablet, chewable: 1 tab(s) orally once a day  bisacodyl 5 mg oral delayed release tablet: 1 tab(s) orally every 12 hours, As needed, Constipation  ferrous sulfate 325 mg oral delayed release tablet: 1 tab(s) orally once a day  gabapentin 100 mg oral capsule: 1 tab(s) orally 2 times a day  HYDROmorphone 2 mg oral tablet: 1 tab(s) orally every 6 hours, As needed, Severe Pain (7 - 10)  metoprolol tartrate 25 mg oral tablet: 1 tab(s) orally 2 times a day  multivitamin: 1 tab(s) orally once a day  polyethylene glycol 3350 oral powder for reconstitution: 17 gram(s) orally 2 times a day  senna leaf extract oral tablet: 2 tab(s) orally once a day (at bedtime)  simvastatin 40 mg oral tablet: 1 tab(s) orally once a day (at bedtime)  Vitamin B-100 oral tablet: 1 tab(s) orally once a day

## 2022-10-12 NOTE — ASSESSMENT
[FreeTextEntry1] : Mr. 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, former smoker, chronic hypoxemic and hypercapnic respiratory failure on home oxygen and nocturnal BiPAP, history of cardiac arrest in 2018 due to respiratory failure, HTN, HLD, DM2 (not on insulin), CAD s/p 3V-CABG (2004) and PCI (Oct 2019 & Aug 2021), PVD s/p bilateral LE stents (most recently Nov 2019) on plavix, A-Fib on apixaban, BPH, and left femur fracture with chronic OM (s/p recent drainage in Dec 2021 of intramedullary abscess, on chronic ciprofloxacin) who presents for follow-up after recent hospitalization at  in August 2022 for acute hypercapnic respiratory failure due to recurrent Asthma-COPD exacerbation (note last exacerbation in May 2022). He improved with BiPAP, nebs, and methylprednisolone, then completed a short prednisone course. Last pulmonary hospitalizations were August 2022, May 2022, Sept 2021, and May 2021. 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 There is moderately reduced diffusing capacity. No significant change compared to Dec 2020\par - 6-minute walk distance in May 2022 173 meters using 2L NC, improved from 148 meters in Dec 2020\par - EOT May 2022 with no hypoxemia at rest (97%), but with exercise hypoxemia to 85% while walking at 1.4 MPH requiring 2 liters NC\par - Reconsider BLVR if continues to experience exacerbations despite increasing exercise\par - ABG in August 2022 during admission with pH 7.30, pCO2 59, pO2 97, SaO2 98%\par - Resolved eosinophilia with mepolizumab and prednisone. IgE previously elevated to 118 in Dec 2019 with allergy testing positive for house dust. Aspergillus IgE negative. Alpha-1 antitrypsin normal and HIV negative\par \par 2. Chronic hypoxemia and hypercapnic respiratory failure:\par - Likely due to asthma/COPD\par - Continue supplemental oxygen with nasal cannula 2-3 LPM with exercise, goal SpO2>88%\par - BiPAP every night, IPAP 18, EPAP 8\par \par 3. Abnormal CT Chest\par - CT Chest in June 2020 with interval predominant resolution of the previously seen LLL consolidation with minimal residual atelectasis. New RLL area of linear atelectasis\par - Hold off on further imaging at this time\par \par 4. Bilateral leg edema:\par - Improved with low salt diet\par - 2D-echo in August 2021 with mild segmental LV systolic dysfunction with hypokinesis of the apical inferior, apical septum, apical lateral, basal inferior, mid anterior, and mid inferior walls. Also with mild diastolic dysfunction\par - Continue cardiac meds, including apixaban, clopidogrel, rosuvastatin, metoprolol\par - Continue low salt diet, keep legs elevated and compression stockings\par - No evidence of pulmonary hypertension on recent 2D-echo\par \par 5. Upper airway cough syndrome:\par - Continue fluticasone nasal spray, 1-2 sprays per nostril twice daily\par - Azelastine nasal spray twice daily\par \par 6. HCM:\par - Influenza vaccine given today\par - Will receive COVID-19 omicron booster this Friday\par - Up-to-date with pneumococcal vaccinations\par - Self-injecting Mepolizumab at home\par \par 7. Follow-up in 2-3 months or sooner prn

## 2022-10-12 NOTE — HISTORY OF PRESENT ILLNESS
[FreeTextEntry1] : Mr. 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, former smoker, chronic hypoxemic and hypercapnic respiratory failure on home oxygen and nocturnal BiPAP, history of cardiac arrest in 2018 due to respiratory failure, HTN, HLD, DM2 (not on insulin), CAD s/p 3V-CABG (2004) and PCI (Oct 2019 & Aug 2021), PVD s/p bilateral LE stents (most recently Nov 2019) on plavix, A-Fib on apixaban, BPH, and  left femur fracture with chronic OM (s/p recent drainage in Dec 2021 of intramedullary abscess, on chronic ciprofloxacin) who presents for follow-up after recent hospitalization at  in August 2022 for acute hypercapnic respiratory failure due to recurrent Asthma-COPD exacerbation (note last exacerbation in May 2022). He improved with BiPAP, nebs, and methylprednisolone, then completed a short prednisone course. Last pulmonary hospitalizations were August 2022, May 2022, Sept 2021, and May 2021. \par \par He currently feels well. He remains on mepolizumab monthly. He is now off prednisone after last hospitalization. He is adherent with Breo Ellipta 200-25 mcg and Incruse Ellipta. He also takes montelukast and azithromycin 250 mg MWF. He is adherent with BiPAP (IPAP 18, EPAP 8) every night from 11pm until 9am. He also wears oxygen during the day with activity, such as walking up the steps. Wife notes that the oxygen saturation decreased to 86-87% with going up the steps without wearing oxygen. \par \par He remains on Jardiance for his diabetes. He also takes clopidogrel, metoprolol, and rosuvastatin. Eliquis dose was recently decreased to 2.5 mg twice daily. He completed a course of ciprofloxacin for the leg infection.\par \par He has not went for pulmonary rehab. Declining physical therapy at home or pulmonary rehab at this time. Has weights, stationary bike, treadmill, and pool at home, and reports that he will start using. He goes to sleep at around 11pm-12am, wakes up at 9am, then naps from 1pm to 2-3 pm. \par \par ABG (May 2022) 7.23/65/95/98% --> improved to 7.41/48/82/97%.\par \par Last 2D-echo in Dec 2021 with mild diastolic dysfunction, normal LV systolic function, no significant valvular disease, and no pulmonary hypertension (estimated RVSP is 28). \par \par PFTs (May 2022) with stable severe obstruction. Increased TLC and RV consistent with dynamic hyperinflation and air trapping. Diffusion capacity is moderately reduced.\par \par 6MWD (May 2022) improved to 173 meters from 148 meters in Dec 2022.\par \par Exercise oximetry (May 2022) with normal oxygen saturation at rest (97%), but developed hypoxemia to 85% while walking at 1.4 MPH requiring 2L NC with recovery to 90%. \par \par Last CT chest in June 2020 with interval improvement and near resolution of LLL pneumonia with minimal residual atelectasis. There is new linear atelectasis in the RLL. Stable emphysema.\par \par Regarding his smoking history, he quit smoking in the 1980s, used to smoke 1 ppd for 30 years. Was smoking marijuana about 3-4 joints 3x/week until 2017. No alcohol use. No other drug use

## 2022-10-17 ENCOUNTER — APPOINTMENT (OUTPATIENT)
Dept: VASCULAR SURGERY | Facility: CLINIC | Age: 82
End: 2022-10-17

## 2022-10-17 VITALS
HEART RATE: 71 BPM | BODY MASS INDEX: 31.35 KG/M2 | HEIGHT: 70 IN | DIASTOLIC BLOOD PRESSURE: 70 MMHG | SYSTOLIC BLOOD PRESSURE: 115 MMHG | WEIGHT: 219 LBS

## 2022-10-17 PROCEDURE — 93925 LOWER EXTREMITY STUDY: CPT

## 2022-10-19 NOTE — DISCHARGE NOTE ADULT - MEDICATION SUMMARY - MEDICATIONS TO CHANGE
Patient discussed during oncology interdisciplinary rounds  Symptom management recommendations discussed with primary team     Thank you for allowing us to participate in your patient's care. Please page [phone] for any questions/concerns. I will SWITCH the dose or number of times a day I take the medications listed below when I get home from the hospital:  None

## 2022-10-25 NOTE — ED ADULT NURSE NOTE - CHIEF COMPLAINT
The patient is a 79y Male complaining of chest pain. Enbrel Counseling:  I discussed with the patient the risks of etanercept including but not limited to myelosuppression, immunosuppression, autoimmune hepatitis, demyelinating diseases, lymphoma, and infections.  The patient understands that monitoring is required including a PPD at baseline and must alert us or the primary physician if symptoms of infection or other concerning signs are noted.

## 2022-11-03 NOTE — PHYSICAL THERAPY INITIAL EVALUATION ADULT - BALANCE DISTURBANCE, IDENTIFIED IMPAIRMENT CONTRIBUTE, REHAB EVAL
Personalized Preventive Plan for Jan Guardado - 11/3/2022  Medicare offers a range of preventive health benefits. Some of the tests and screenings are paid in full while other may be subject to a deductible, co-insurance, and/or copay. Some of these benefits include a comprehensive review of your medical history including lifestyle, illnesses that may run in your family, and various assessments and screenings as appropriate. After reviewing your medical record and screening and assessments performed today your provider may have ordered immunizations, labs, imaging, and/or referrals for you. A list of these orders (if applicable) as well as your Preventive Care list are included within your After Visit Summary for your review. Other Preventive Recommendations:    A preventive eye exam performed by an eye specialist is recommended every 1-2 years to screen for glaucoma; cataracts, macular degeneration, and other eye disorders. A preventive dental visit is recommended every 6 months. Try to get at least 150 minutes of exercise per week or 10,000 steps per day on a pedometer . Order or download the FREE \"Exercise & Physical Activity: Your Everyday Guide\" from [employer]. Call 6-705.318.8622 or search [employer] online. You need 2953-3532 mg of calcium and 2836-3433 IU of vitamin D per day. It is possible to meet your calcium requirement with diet alone, but a vitamin D supplement is usually necessary to meet this goal.  When exposed to the sun, use a sunscreen that protects against both UVA and UVB radiation with an SPF of 30 or greater. Reapply every 2 to 3 hours or after sweating, drying off with a towel, or swimming. Always wear a seat belt when traveling in a car. Always wear a helmet when riding a bicycle or motorcycle. decreased strength decreased strength/impaired postural control/impaired coordination

## 2022-11-18 NOTE — PROVIDER CONTACT NOTE (CRITICAL VALUE NOTIFICATION) - ACTION/TREATMENT ORDERED:
From: Anabella Armstrong  To: Ardia Buerger, DO  Sent: 11/17/2022 2:25 PM CST  Subject: MRI Followup     Hello, I am due for my annual MRI in December but am not sure if I should have a full head MRI or the pituitary gland. My last MRI had some questionable things, so I wonder if we should continue with the full MRI to double-check everything. Can you please let me know and send a referral? I would like to use [facility] in [place], [place].  Thank you No new orders at this time

## 2022-11-18 NOTE — ED ADULT TRIAGE NOTE - TEMPERATURE IN CELSIUS (DEGREES C)
36.7
Quality 130: Documentation Of Current Medications In The Medical Record: Current Medications Documented
Detail Level: Generalized
Additional Notes: Patient declined flu vaccine
Quality 110: Preventive Care And Screening: Influenza Immunization: Influenza Immunization not Administered for Documented Reasons.
Quality 226: Preventive Care And Screening: Tobacco Use: Screening And Cessation Intervention: Patient screened for tobacco use and is an ex/non-smoker

## 2022-11-27 ENCOUNTER — EMERGENCY (EMERGENCY)
Facility: HOSPITAL | Age: 82
LOS: 1 days | Discharge: HOME | End: 2022-11-27
Attending: EMERGENCY MEDICINE | Admitting: EMERGENCY MEDICINE
Payer: COMMERCIAL

## 2022-11-27 VITALS
HEART RATE: 100 BPM | TEMPERATURE: 98 F | WEIGHT: 225.09 LBS | OXYGEN SATURATION: 97 % | DIASTOLIC BLOOD PRESSURE: 84 MMHG | SYSTOLIC BLOOD PRESSURE: 142 MMHG | HEIGHT: 71 IN | RESPIRATION RATE: 18 BRPM

## 2022-11-27 VITALS
SYSTOLIC BLOOD PRESSURE: 140 MMHG | DIASTOLIC BLOOD PRESSURE: 66 MMHG | OXYGEN SATURATION: 98 % | RESPIRATION RATE: 18 BRPM | HEART RATE: 89 BPM

## 2022-11-27 DIAGNOSIS — Z95.5 PRESENCE OF CORONARY ANGIOPLASTY IMPLANT AND GRAFT: Chronic | ICD-10-CM

## 2022-11-27 DIAGNOSIS — T14.8XXA OTHER INJURY OF UNSPECIFIED BODY REGION, INITIAL ENCOUNTER: Chronic | ICD-10-CM

## 2022-11-27 DIAGNOSIS — Z98.890 OTHER SPECIFIED POSTPROCEDURAL STATES: Chronic | ICD-10-CM

## 2022-11-27 LAB
ALBUMIN SERPL ELPH-MCNC: 3.7 G/DL — SIGNIFICANT CHANGE UP (ref 3.3–5)
ALP SERPL-CCNC: 128 U/L — HIGH (ref 40–120)
ALT FLD-CCNC: 38 U/L — SIGNIFICANT CHANGE UP (ref 12–78)
ANION GAP SERPL CALC-SCNC: 7 MMOL/L — SIGNIFICANT CHANGE UP (ref 5–17)
APTT BLD: 40.6 SEC — HIGH (ref 27.5–35.5)
AST SERPL-CCNC: 30 U/L — SIGNIFICANT CHANGE UP (ref 15–37)
BASOPHILS # BLD AUTO: 0 K/UL — SIGNIFICANT CHANGE UP (ref 0–0.2)
BASOPHILS NFR BLD AUTO: 0 % — SIGNIFICANT CHANGE UP (ref 0–2)
BILIRUB SERPL-MCNC: 0.4 MG/DL — SIGNIFICANT CHANGE UP (ref 0.2–1.2)
BUN SERPL-MCNC: 32 MG/DL — HIGH (ref 7–23)
CALCIUM SERPL-MCNC: 9.4 MG/DL — SIGNIFICANT CHANGE UP (ref 8.5–10.1)
CHLORIDE SERPL-SCNC: 108 MMOL/L — SIGNIFICANT CHANGE UP (ref 96–108)
CO2 SERPL-SCNC: 27 MMOL/L — SIGNIFICANT CHANGE UP (ref 22–31)
CREAT SERPL-MCNC: 1.7 MG/DL — HIGH (ref 0.5–1.3)
EGFR: 40 ML/MIN/1.73M2 — LOW
EOSINOPHIL # BLD AUTO: 0 K/UL — SIGNIFICANT CHANGE UP (ref 0–0.5)
EOSINOPHIL NFR BLD AUTO: 0 % — SIGNIFICANT CHANGE UP (ref 0–6)
GLUCOSE SERPL-MCNC: 233 MG/DL — HIGH (ref 70–99)
HCT VFR BLD CALC: 51.5 % — HIGH (ref 39–50)
HGB BLD-MCNC: 15.6 G/DL — SIGNIFICANT CHANGE UP (ref 13–17)
INR BLD: 0.99 RATIO — SIGNIFICANT CHANGE UP (ref 0.88–1.16)
LACTATE SERPL-SCNC: 1.6 MMOL/L — SIGNIFICANT CHANGE UP (ref 0.7–2)
LYMPHOCYTES # BLD AUTO: 0.49 K/UL — LOW (ref 1–3.3)
LYMPHOCYTES # BLD AUTO: 7 % — LOW (ref 13–44)
MCHC RBC-ENTMCNC: 27.5 PG — SIGNIFICANT CHANGE UP (ref 27–34)
MCHC RBC-ENTMCNC: 30.3 GM/DL — LOW (ref 32–36)
MCV RBC AUTO: 90.8 FL — SIGNIFICANT CHANGE UP (ref 80–100)
MONOCYTES # BLD AUTO: 0.14 K/UL — SIGNIFICANT CHANGE UP (ref 0–0.9)
MONOCYTES NFR BLD AUTO: 2 % — SIGNIFICANT CHANGE UP (ref 2–14)
NEUTROPHILS # BLD AUTO: 5.83 K/UL — SIGNIFICANT CHANGE UP (ref 1.8–7.4)
NEUTROPHILS NFR BLD AUTO: 80 % — HIGH (ref 43–77)
NRBC # BLD: SIGNIFICANT CHANGE UP /100 WBCS (ref 0–0)
NT-PROBNP SERPL-SCNC: 480 PG/ML — HIGH (ref 0–450)
PLATELET # BLD AUTO: 212 K/UL — SIGNIFICANT CHANGE UP (ref 150–400)
POTASSIUM SERPL-MCNC: 4.9 MMOL/L — SIGNIFICANT CHANGE UP (ref 3.5–5.3)
POTASSIUM SERPL-SCNC: 4.9 MMOL/L — SIGNIFICANT CHANGE UP (ref 3.5–5.3)
PROT SERPL-MCNC: 7.6 G/DL — SIGNIFICANT CHANGE UP (ref 6–8.3)
PROTHROM AB SERPL-ACNC: 11.6 SEC — SIGNIFICANT CHANGE UP (ref 10.5–13.4)
RAPID RVP RESULT: SIGNIFICANT CHANGE UP
RBC # BLD: 5.67 M/UL — SIGNIFICANT CHANGE UP (ref 4.2–5.8)
RBC # FLD: 13.2 % — SIGNIFICANT CHANGE UP (ref 10.3–14.5)
SARS-COV-2 RNA SPEC QL NAA+PROBE: SIGNIFICANT CHANGE UP
SODIUM SERPL-SCNC: 142 MMOL/L — SIGNIFICANT CHANGE UP (ref 135–145)
TROPONIN I, HIGH SENSITIVITY RESULT: 13.3 NG/L — SIGNIFICANT CHANGE UP
WBC # BLD: 6.94 K/UL — SIGNIFICANT CHANGE UP (ref 3.8–10.5)
WBC # FLD AUTO: 6.94 K/UL — SIGNIFICANT CHANGE UP (ref 3.8–10.5)

## 2022-11-27 PROCEDURE — 83880 ASSAY OF NATRIURETIC PEPTIDE: CPT

## 2022-11-27 PROCEDURE — 93005 ELECTROCARDIOGRAM TRACING: CPT

## 2022-11-27 PROCEDURE — 71045 X-RAY EXAM CHEST 1 VIEW: CPT | Mod: 26

## 2022-11-27 PROCEDURE — 87040 BLOOD CULTURE FOR BACTERIA: CPT

## 2022-11-27 PROCEDURE — 93010 ELECTROCARDIOGRAM REPORT: CPT

## 2022-11-27 PROCEDURE — 85025 COMPLETE CBC W/AUTO DIFF WBC: CPT

## 2022-11-27 PROCEDURE — 85610 PROTHROMBIN TIME: CPT

## 2022-11-27 PROCEDURE — 99285 EMERGENCY DEPT VISIT HI MDM: CPT

## 2022-11-27 PROCEDURE — 96374 THER/PROPH/DIAG INJ IV PUSH: CPT

## 2022-11-27 PROCEDURE — 0225U NFCT DS DNA&RNA 21 SARSCOV2: CPT

## 2022-11-27 PROCEDURE — 71045 X-RAY EXAM CHEST 1 VIEW: CPT

## 2022-11-27 PROCEDURE — 83605 ASSAY OF LACTIC ACID: CPT

## 2022-11-27 PROCEDURE — 80053 COMPREHEN METABOLIC PANEL: CPT

## 2022-11-27 PROCEDURE — 36415 COLL VENOUS BLD VENIPUNCTURE: CPT

## 2022-11-27 PROCEDURE — 94640 AIRWAY INHALATION TREATMENT: CPT

## 2022-11-27 PROCEDURE — 99285 EMERGENCY DEPT VISIT HI MDM: CPT | Mod: 25

## 2022-11-27 PROCEDURE — 85730 THROMBOPLASTIN TIME PARTIAL: CPT

## 2022-11-27 PROCEDURE — 84484 ASSAY OF TROPONIN QUANT: CPT

## 2022-11-27 RX ORDER — IPRATROPIUM/ALBUTEROL SULFATE 18-103MCG
3 AEROSOL WITH ADAPTER (GRAM) INHALATION ONCE
Refills: 0 | Status: COMPLETED | OUTPATIENT
Start: 2022-11-27 | End: 2022-11-27

## 2022-11-27 RX ADMIN — Medication 125 MILLIGRAM(S): at 09:11

## 2022-11-27 RX ADMIN — Medication 3 MILLILITER(S): at 09:09

## 2022-11-27 NOTE — ED PROVIDER NOTE - NSFOLLOWUPINSTRUCTIONS_ED_ALL_ED_FT
Chronic Obstructive Pulmonary Disease       Chronic obstructive pulmonary disease (COPD) is a long-term (chronic) lung problem. When you have COPD, it is hard for air to get in and out of your lungs.    Usually the condition gets worse over time, and your lungs will never return to normal. There are things you can do to keep yourself as healthy as possible.      What are the causes?    •Smoking. This is the most common cause.      •Certain genes passed from parent to child (inherited).        What increases the risk?    •Being exposed to secondhand smoke from cigarettes, pipes, or cigars.      •Being exposed to chemicals and other irritants, such as fumes and dust in the work environment.      •Having chronic lung conditions or infections.        What are the signs or symptoms?    •Shortness of breath, especially during physical activity.      •A long-term cough with a large amount of thick mucus. Sometimes, the cough may not have any mucus (dry cough).      •Wheezing.      •Breathing quickly.      •Skin that looks gray or blue, especially in the fingers, toes, or lips.      •Feeling tired (fatigue).      •Weight loss.      •Chest tightness.      •Having infections often.      •Episodes when breathing symptoms become much worse (exacerbations).      At the later stages of this disease, you may have swelling in the ankles, feet, or legs.      How is this treated?    •Taking medicines.      •Quitting smoking, if you smoke.      •Rehabilitation. This includes steps to make your body work better. It may involve a team of specialists.      •Doing exercises.      •Making changes to your diet.      •Using oxygen.      •Lung surgery.      •Lung transplant.      •Comfort measures (palliative care).        Follow these instructions at home:    Medicines     •Take over-the-counter and prescription medicines only as told by your doctor.      •Talk to your doctor before taking any cough or allergy medicines. You may need to avoid medicines that cause your lungs to be dry.      Lifestyle     •If you smoke, stop smoking. Smoking makes the problem worse.      • Do not smoke or use any products that contain nicotine or tobacco. If you need help quitting, ask your doctor.      •Avoid being around things that make your breathing worse. This may include smoke, chemicals, and fumes.      •Stay active, but remember to rest as well.      •Learn and use tips on how to manage stress and control your breathing.      •Make sure you get enough sleep. Most adults need at least 7 hours of sleep every night.      •Eat healthy foods. Eat smaller meals more often. Rest before meals.        Controlled breathing      Learn and use tips on how to control your breathing as told by your doctor. Try:  •Breathing in (inhaling) through your nose for 1 second. Then, pucker your lips and breath out (exhale) through your lips for 2 seconds.      •Putting one hand on your belly (abdomen). Breathe in slowly through your nose for 1 second. Your hand on your belly should move out. Pucker your lips and breathe out slowly through your lips. Your hand on your belly should move in as you breathe out.      Controlled coughing     Learn and use controlled coughing to clear mucus from your lungs. Follow these steps:  1.Lean your head a little forward.      2.Breathe in deeply.      3.Try to hold your breath for 3 seconds.      4.Keep your mouth slightly open while coughing 2 times.      5.Spit any mucus out into a tissue.      6.Rest and do the steps again 1 or 2 times as needed.      General instructions    •Make sure you get all the shots (vaccines) that your doctor recommends. Ask your doctor about a flu shot and a pneumonia shot.      •Use oxygen therapy and pulmonary rehabilitation if told by your doctor. If you need home oxygen therapy, ask your doctor if you should buy a tool to measure your oxygen level (oximeter).      •Make a COPD action plan with your doctor. This helps you to know what to do if you feel worse than usual.      •Manage any other conditions you have as told by your doctor.      •Avoid going outside when it is very hot, cold, or humid.      •Avoid people who have a sickness you can catch (contagious).      •Keep all follow-up visits.        Contact a doctor if:    •You cough up more mucus than usual.      •There is a change in the color or thickness of the mucus.      •It is harder to breathe than usual.      •Your breathing is faster than usual.      •You have trouble sleeping.      •You need to use your medicines more often than usual.      •You have trouble doing your normal activities such as getting dressed or walking around the house.        Get help right away if:    •You have shortness of breath while resting.    •You have shortness of breath that stops you from:  •Being able to talk.      •Doing normal activities.        •Your chest hurts for longer than 5 minutes.      •Your skin color is more blue than usual.      •Your pulse oximeter shows that you have low oxygen for longer than 5 minutes.      •You have a fever.      •You feel too tired to breathe normally.      These symptoms may represent a serious problem that is an emergency. Do not wait to see if the symptoms will go away. Get medical help right away. Call your local emergency services (911 in the [place] Do not drive yourself to the hospital.       Summary    •Chronic obstructive pulmonary disease (COPD) is a long-term lung problem.      •The way your lungs work will never return to normal. Usually the condition gets worse over time. There are things you can do to keep yourself as healthy as possible.      •Take over-the-counter and prescription medicines only as told by your doctor.      •If you smoke, stop. Smoking makes the problem worse.      This information is not intended to replace advice given to you by your health care provider. Make sure you discuss any questions you have with your health care provider.

## 2022-11-27 NOTE — ED PROVIDER NOTE - OBJECTIVE STATEMENT
82-year-old male with history of COPD, status post CABG on Eliquis and metoprolol presents to the ER with worsening symptoms of shortness of breath x1 day.  Wife at bedside states that yesterday patient required increase in his home O2 from 2 L as needed to 6 L secondary to hypoxia in the 70s.  Patient states he spent Thanksgiving with his grandchildren who were sick at the time.  Patient denies any fevers, chest pain.  Patient did use nebulizer yesterday and had 40 mg of prednisone with some relief of symptoms.  This morning patient with continued shortness of breath and increased work of breathing despite inhalers sating in the 80s as per wife.

## 2022-11-27 NOTE — ED PROVIDER NOTE - SKIN, MLM
Patient arrives to ED with complaints of left back pain that radiates to her left chest that began two days ago.  States that when she swallows, it feels like something gets stuck in the middle of her chest.  SOB as well.  Hx of angina.   Skin normal color for race, warm, dry and intact. No evidence of rash.

## 2022-11-27 NOTE — ED ADULT TRIAGE NOTE - NS ED NURSE AMBULANCES
Discharged [facility] a followup t o PCP. Return here if worse.  Upset over still not feeling well     Brit Cain RN  06/26/19 [address] [facility]

## 2022-11-27 NOTE — ED PROVIDER NOTE - PATIENT PORTAL LINK FT
You can access the FollowMyHealth Patient Portal offered by [facility] by registering at the following website: [email or web address]. By joining [employer]’s FollowMyHealth portal, you will also be able to view your health information using other applications (apps) compatible with our system.

## 2022-11-27 NOTE — ED ADULT NURSE NOTE - CAS ELECT INFOMATION PROVIDED
Spoke with admin regarding a call back from Quin Dexter to consult. Left contact info and indicated she could leave me a message if I cannot answer.   
DC instructions

## 2022-11-27 NOTE — ED ADULT NURSE NOTE - OBJECTIVE STATEMENT
patient from home a&ox4 BIBA with complaints of SOB. pt with hx COPD. as per wife, o2 sat went down to 76% at home on room air. patient to ED on 3L NC with o2 sat 99% at this time. patient able to speak in full sentences at this time. patient took 40 mg prednisone at home prior to arrival. denies chest pain.

## 2022-11-28 NOTE — ED ADULT TRIAGE NOTE - TEMPERATURE IN FAHRENHEIT (DEGREES F)
95.7 Azelaic Acid Counseling: Patient counseled that medicine may cause skin irritation and to avoid applying near the eyes.  In the event of skin irritation, the patient was advised to reduce the amount of the drug applied or use it less frequently.   The patient verbalized understanding of the proper use and possible adverse effects of azelaic acid.  All of the patient's questions and concerns were addressed.

## 2022-12-06 NOTE — ED ADULT TRIAGE NOTE - NS ED NURSE BANDS TYPE
Amaya Aguilar is a 58 year old female presenting with Left side ribs injury DOI 2022  Symptoms started  Last night   OTC medications used: n/a  Denies  known Latex allergy or symptoms of Latex sensitivity.  Social History     Tobacco Use   Smoking Status Former Smoker   • Packs/day: 1.00   • Years: 10.00   • Pack years: 10.00   • Types: Cigarettes   • Quit date: 2010   • Years since quittin.2   Smokeless Tobacco Never Used     All allergies and medications reviewed.  PCP verified:  GRETA PRINCE   Pharmacy verified:   [id number] [place] [address] [address]  AT [facility] [address]      Patient would like communication of their results via:        Cell Phone:   Telephone Information:   Mobile [phone]     Okay to leave a message containing results? Yes  [phone]   Name band;

## 2022-12-22 ENCOUNTER — APPOINTMENT (OUTPATIENT)
Dept: PULMONOLOGY | Facility: CLINIC | Age: 82
End: 2022-12-22
Payer: MEDICARE

## 2022-12-22 ENCOUNTER — NON-APPOINTMENT (OUTPATIENT)
Age: 82
End: 2022-12-22

## 2022-12-22 PROCEDURE — 99443: CPT | Mod: 95

## 2022-12-23 NOTE — HISTORY OF PRESENT ILLNESS
[TextBox_4] : 82 y.o male PMH Asthma , CRF on Bipap and supplemental O2 with c/o reporting increased SOB since yesterday- O2 88 upon awakening this morning on RA. She gave him budesonide and albuterol nebulizer x 2 this morning and prednisone 40 mg from old rx in home. He denies fever, chills, nasal congestion, change in cough/ sputum, sick contact. \par \par saO2 on 2L 97% during call\par \par Currently on Breo/Incruse/ Azithromycin tiw/ Nucala monthly. \par Also with recent ER visit last month for Similar symptoms -received prednisone / RVP was negative. \par

## 2022-12-23 NOTE — ASSESSMENT
[FreeTextEntry1] : Asthma exacerbation: con prednisone 40 mg x 5 days. Advised pt not to double up on albuterol nebs, additional prednisone. Con supplemental O2 as needed, BiPAP hs. Worsening symptoms to call office. \par \par F/u as scheduled. \par \par

## 2023-01-01 ENCOUNTER — APPOINTMENT (OUTPATIENT)
Dept: INTERNAL MEDICINE | Facility: CLINIC | Age: 83
End: 2023-01-01

## 2023-01-01 ENCOUNTER — NON-APPOINTMENT (OUTPATIENT)
Age: 83
End: 2023-01-01

## 2023-01-01 ENCOUNTER — APPOINTMENT (OUTPATIENT)
Dept: VASCULAR SURGERY | Facility: CLINIC | Age: 83
End: 2023-01-01

## 2023-01-01 ENCOUNTER — RX RENEWAL (OUTPATIENT)
Age: 83
End: 2023-01-01

## 2023-01-01 ENCOUNTER — APPOINTMENT (OUTPATIENT)
Dept: PULMONOLOGY | Facility: CLINIC | Age: 83
End: 2023-01-01
Payer: MEDICARE

## 2023-01-01 ENCOUNTER — OUTPATIENT (OUTPATIENT)
Dept: OUTPATIENT SERVICES | Facility: HOSPITAL | Age: 83
LOS: 1 days | End: 2023-01-01

## 2023-01-01 ENCOUNTER — APPOINTMENT (OUTPATIENT)
Dept: VASCULAR SURGERY | Facility: CLINIC | Age: 83
End: 2023-01-01
Payer: MEDICARE

## 2023-01-01 ENCOUNTER — EMERGENCY (EMERGENCY)
Facility: HOSPITAL | Age: 83
LOS: 1 days | Discharge: HOME | End: 2023-01-01
Attending: EMERGENCY MEDICINE | Admitting: EMERGENCY MEDICINE
Payer: COMMERCIAL

## 2023-01-01 VITALS
TEMPERATURE: 98 F | OXYGEN SATURATION: 100 % | DIASTOLIC BLOOD PRESSURE: 64 MMHG | RESPIRATION RATE: 18 BRPM | SYSTOLIC BLOOD PRESSURE: 118 MMHG | HEART RATE: 91 BPM

## 2023-01-01 VITALS
OXYGEN SATURATION: 100 % | SYSTOLIC BLOOD PRESSURE: 116 MMHG | HEIGHT: 71 IN | HEART RATE: 90 BPM | RESPIRATION RATE: 18 BRPM | DIASTOLIC BLOOD PRESSURE: 66 MMHG | WEIGHT: 225.09 LBS | TEMPERATURE: 98 F

## 2023-01-01 VITALS
TEMPERATURE: 97.4 F | HEIGHT: 71 IN | SYSTOLIC BLOOD PRESSURE: 134 MMHG | WEIGHT: 212 LBS | DIASTOLIC BLOOD PRESSURE: 81 MMHG | HEART RATE: 90 BPM | BODY MASS INDEX: 29.68 KG/M2 | RESPIRATION RATE: 16 BRPM | OXYGEN SATURATION: 94 %

## 2023-01-01 DIAGNOSIS — Z95.5 PRESENCE OF CORONARY ANGIOPLASTY IMPLANT AND GRAFT: Chronic | ICD-10-CM

## 2023-01-01 DIAGNOSIS — Z98.890 OTHER SPECIFIED POSTPROCEDURAL STATES: Chronic | ICD-10-CM

## 2023-01-01 DIAGNOSIS — J45.50 SEVERE PERSISTENT ASTHMA, UNCOMPLICATED: ICD-10-CM

## 2023-01-01 DIAGNOSIS — J30.9 ALLERGIC RHINITIS, UNSPECIFIED: ICD-10-CM

## 2023-01-01 DIAGNOSIS — T14.8XXA OTHER INJURY OF UNSPECIFIED BODY REGION, INITIAL ENCOUNTER: Chronic | ICD-10-CM

## 2023-01-01 PROCEDURE — 93925 LOWER EXTREMITY STUDY: CPT

## 2023-01-01 PROCEDURE — 99284 EMERGENCY DEPT VISIT MOD MDM: CPT

## 2023-01-01 PROCEDURE — 93979 VASCULAR STUDY: CPT

## 2023-01-01 PROCEDURE — 99283 EMERGENCY DEPT VISIT LOW MDM: CPT

## 2023-01-01 PROCEDURE — 99215 OFFICE O/P EST HI 40 MIN: CPT

## 2023-01-01 RX ORDER — AZITHROMYCIN 250 MG/1
250 TABLET, FILM COATED ORAL DAILY
Qty: 36 | Refills: 3 | Status: DISCONTINUED | COMMUNITY
Start: 2023-01-01 | End: 2023-01-01

## 2023-01-05 ENCOUNTER — APPOINTMENT (OUTPATIENT)
Dept: PULMONOLOGY | Facility: CLINIC | Age: 83
End: 2023-01-05
Payer: MEDICARE

## 2023-01-05 VITALS
TEMPERATURE: 98.1 F | WEIGHT: 215 LBS | HEIGHT: 70 IN | OXYGEN SATURATION: 96 % | HEART RATE: 87 BPM | RESPIRATION RATE: 17 BRPM | BODY MASS INDEX: 30.78 KG/M2 | SYSTOLIC BLOOD PRESSURE: 111 MMHG | DIASTOLIC BLOOD PRESSURE: 75 MMHG

## 2023-01-05 PROCEDURE — 99215 OFFICE O/P EST HI 40 MIN: CPT | Mod: 95

## 2023-01-05 RX ORDER — ALBUTEROL SULFATE 2.5 MG/3ML
(2.5 MG/3ML) SOLUTION RESPIRATORY (INHALATION) EVERY 6 HOURS
Qty: 1 | Refills: 3 | Status: ACTIVE | COMMUNITY
Start: 2022-06-02 | End: 1900-01-01

## 2023-01-05 NOTE — REASON FOR VISIT
[Home] : at home, [unfilled] , at the time of the visit. [Medical Office: ([facility])___] : at the medical office located in  [Spouse] : spouse [Self] : self [Follow-Up - From Hospitalization] : a follow-up visit after a recent hospitalization [Asthma] : asthma [COPD] : COPD

## 2023-01-08 NOTE — ASSESSMENT
[FreeTextEntry1] : Patient with peripheral vascular disease, \par Right popliteal artery beyond the stent at the short segment occlusion.  Good flow through the stent.   Continue conservative management.  Follow-up in 2 months.
None

## 2023-01-17 NOTE — PATIENT PROFILE ADULT - NSPROCHRONICPAIN_GEN_A_NUR
January 17, 2023      [facility]  [facility] [facility] [address]  Phone: [phone]       Patient: Prabhakar Crowe   YOB: 2003  Date of Visit: 01/17/2023    To Whom It May Concern:    Rody Crowe  was at [facility] on 01/17/2023. The patient may return to work/school on 1/18/2023 with no restrictions. If you have any questions or concerns, or if I can be of further assistance, please do not hesitate to contact me.    Sincerely,    HO Murcia     
no

## 2023-01-18 NOTE — PATIENT PROFILE ADULT - BRADEN MOISTURE
(4) rarely moist Rituxan Counseling:  I discussed with the patient the risks of Rituxan infusions. Side effects can include infusion reactions, severe drug rashes including mucocutaneous reactions, reactivation of latent hepatitis and other infections and rarely progressive multifocal leukoencephalopathy.  All of the patient's questions and concerns were addressed.

## 2023-01-30 NOTE — ASSESSMENT
[FreeTextEntry1] : Mr. 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 He was recently in the ED at  on 11/27/22 with asthma exacerbation. Labs stable, RVP negative, blood cultures negative, and CXR clear. He was given prednisone 50 mg daily for 4 days. Symptoms then resolved. He developed recurrent dyspnea with mild hypoxemia to 88%. He required a short prednisone course (40 mg daily for 4 days) and was maintained on albuterol and budesonide nebulizers. Last pulmonary hospitalizations were August 2022, May 2022, Sept 2021, and May 2021. Last ED visit was in Nov 2022. Last steroids was in Dec 2022. \par \par 1. 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 Fasenra is a reasonable alternative\par - Continue Breo Ellipta 200-25 mcg 1 inhalation daily\par - Incruse Ellipta 1 inhalation daily\par - Montelukast 10 mg at bedtime\par - Azithromycin 250 mg MWF\par - Ventolin prn\par - Consider restarting pulmonary rehab\par - Increase exercise as tolerated, including walking in the house and outdoors, using stationary bike and treadmill\par - PFTs (May 2022) with severe obstruction, increased TLC and RV consistent with air trapping and dynamic hyperinflation. There is moderately reduced diffusing capacity. No significant change compared to Dec 2020\par - 6-minute walk distance in May 2022 173 meters using 2L NC, improved from 148 meters in Dec 2020\par - EOT May 2022 with no hypoxemia at rest (97%), but with exercise hypoxemia to 85% while walking at 1.4 MPH requiring 2 liters NC\par - Reconsider BLVR if continues to experience exacerbations despite increasing exercise\par - ABG in August 2022 during admission with pH 7.30, pCO2 59, pO2 97, SaO2 98%\par - Resolved eosinophilia with mepolizumab and prednisone. IgE previously elevated to 118 in Dec 2019 with allergy testing positive for house dust. Aspergillus IgE negative. Alpha-1 antitrypsin normal and HIV negative\par \par 2. Chronic hypoxemia and hypercapnic respiratory failure:\par - Likely due to asthma/COPD\par - Continue supplemental oxygen with nasal cannula 2-3 LPM with exercise, goal SpO2>88%\par - BiPAP every night, IPAP 18, EPAP 8\par \par 3. Abnormal CT Chest\par - CT Chest in June 2020 with interval predominant resolution of the previously seen LLL consolidation with minimal residual atelectasis. New RLL area of linear atelectasis\par - Hold off on further imaging at this time\par \par 4. Bilateral leg edema:\par - Improved with low salt diet\par - 2D-echo in August 2021 with mild segmental LV systolic dysfunction with hypokinesis of the apical inferior, apical septum, apical lateral, basal inferior, mid anterior, and mid inferior walls. Also with mild diastolic dysfunction\par - Continue cardiac meds, including apixaban, clopidogrel, rosuvastatin, metoprolol\par - Continue low salt diet, keep legs elevated and compression stockings\par - No evidence of pulmonary hypertension on recent 2D-echo\par \par 5. Upper airway cough syndrome:\par - Continue fluticasone nasal spray, 1-2 sprays per nostril twice daily\par - Azelastine nasal spray twice daily\par \par 6. HCM:\par - Up to date with influenza, pneumococcal, and COVID-19 vaccinations\par \par 7. Follow-up in 2-3 months or sooner prn
Additional Progress Note...

## 2023-01-30 NOTE — HISTORY OF PRESENT ILLNESS
[FreeTextEntry1] : Mr. 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 Labs stable, RVP negative, blood cultures negative, and CXR clear. He was given prednisone 50 mg daily for 4 days. Symptoms then resolved. He developed recurrent dyspnea with mild hypoxemia to 88%. He required a short prednisone course (40 mg daily for 4 days) and was maintained on albuterol and budesonide nebulizers. \par \par Last pulmonary hospitalizations were August 2022, May 2022, Sept 2021, and May 2021. Last ED visit was in Nov 2022. Last steroids was in Dec 2022. \par \par He currently feels well and is back to normal currently. He remains on mepolizumab injections monthly, last 12/13, next due on 1/13. He is now off prednisone. He is adherent with Breo Ellipta 200-25 mcg and Incruse Ellipta. He also takes montelukast and azithromycin 250 mg MWF. He is adherent with BiPAP (IPAP 18, EPAP 8) every night from 11pm until 9am. He also wears oxygen during the day with activity, such as walking up the steps. Oxygen saturation remains at 93-95% on room air at rest, with desaturation to about 88% with activity. With 2L NC, oxygen saturation is in the upper 90s. He uses the oxygen intermittently as he prefers not to use. Oxygen saturation currently after walking to the bathroom is 92-93%.\par \par He remains on Jardiance for his diabetes. He also takes apixaban 2.5 mg bid, clopidogrel, metoprolol, and rosuvastatin. He followed with cardiology tomorrow. He was placed on a Holter monitor. He was prescribed a diuretic for his leg edema.\par \par He has not went for pulmonary rehab. Declining physical therapy at home or pulmonary rehab at this time. Has weights, stationary bike, treadmill, and pool at home, and reports that he will start using. He goes to sleep at around 11pm-12am, wakes up at 9am, then naps from 1pm to 2-3 pm. \par \par ABG (May 2022) 7.23/65/95/98% --> improved to 7.41/48/82/97%.\par \par Last 2D-echo in Dec 2021 with mild diastolic dysfunction, normal LV systolic function, no significant valvular disease, and no pulmonary hypertension (estimated RVSP is 28). \par \par PFTs (May 2022) with stable severe obstruction. Increased TLC and RV consistent with dynamic hyperinflation and air trapping. Diffusion capacity is moderately reduced.\par \par 6MWD (May 2022) improved to 173 meters from 148 meters in Dec 2022.\par \par Exercise oximetry (May 2022) with normal oxygen saturation at rest (97%), but developed hypoxemia to 85% while walking at 1.4 MPH requiring 2L NC with recovery to 90%. \par \par Last CT chest in June 2020 with interval improvement and near resolution of LLL pneumonia with minimal residual atelectasis. There is new linear atelectasis in the RLL. Stable emphysema.\par \par Regarding his smoking history, he quit smoking in the 1980s, used to smoke 1 ppd for 30 years. Was smoking marijuana about 3-4 joints 3x/week until 2017. No alcohol use. No other drug use

## 2023-01-30 NOTE — PHYSICAL EXAM
[No Acute Distress] : no acute distress [Well Nourished] : well nourished [Well Developed] : well developed [No Resp Distress] : no resp distress [Oriented x3] : oriented x3 [Normal Affect] : normal affect

## 2023-02-09 NOTE — PATIENT PROFILE ADULT - NSPRONUTRITIONRISK_GEN_A_NUR
Addended by: JULIANO GABRIEL on: 2/9/2023 08:13 AM     Modules accepted: Orders    
No indicators present

## 2023-02-14 ENCOUNTER — NON-APPOINTMENT (OUTPATIENT)
Age: 83
End: 2023-02-14

## 2023-02-14 NOTE — ED PROCEDURE NOTE - CPROC ED TOLERANCE1
Patient : Mague Noyola Age: 62 year old Sex: female   MRN: [id number] Encounter Date: 2/14/2023      History     Chief Complaint   Patient presents with   • Shortness of Breath     HPI      Teaching-Supervisory Addendum-Brief     This is an addendum to the resident's notes, Dr. Dunaway  please review his/her notes for the full H&P, medical decision making, re-evaluation, consultation, and final disposition.  I have discussed      I participated in the following activities of this patients care: the medical history, the physical exam, medical decision making, the procedure.     I personally performed: supervision of the patient's care, the medical history, the physical exam, the medical decision making.     The case was discussed with: the resident    Procedures: I directly supervised any procedure(s) noted by resident    Evaluation and management service: I agree with the evaluation and management decisions made in this patient's care.     61 y/o M with hx of ESRD on HD TTS, CVA with left-sided deficits, atrial fibrillation on Eliquis, prior VTE, DM, hypothyroidism, CAD status post CABG who presents to the ED with complaint of shortness of breath that has been going on for th past couple of days, no cp, no f/c, no n/v/d, no abd pain.  Pt denies missing her dialysis, states hePt was here recently for similar complaints on - 2/09/2023.      No toxic habits        No Known Allergies    Current Discharge Medication List      Prior to Admission Medications    Details   bismuth subsalicylate (PEPTO-BISMOL) 262 MG chewable tablet Chew 524 mg by mouth 4 times daily as needed for Diarrhea.      carvedilol (COREG) 25 MG tablet Take 1 tablet by mouth every 12 hours.  Qty: 120 tablet, Refills: 0      apixaBAN (ELIQUIS) 5 MG Tab Take 5 mg by mouth every 12 hours.      epoetin olivia-epbx (RETACRIT) 96592 UNIT/ML injection Inject 20,000 Units into the skin 3 days a week. Tuesday, Thursday and Saturday  Qty: 1.8 mL       cinacalcet (SENSIPAR) 30 MG tablet Take 30 mg by mouth at bedtime.      levothyroxine 200 MCG tablet Take 200 mcg by mouth daily. TAKE 1 TABLET BY MOUTH ONCE A DAY ALONG WITH 0.025 MG DOSE      levothyroxine 25 MCG tablet Take 25 mcg by mouth daily. TAKE 1 TABLET BY MOUTH EVERY DAY IN ADDITION TO LEVOTHYROXINE 200 MG EVERY DAY      ergocalciferol (DRISDOL) 1.25 mg (50,000 units) capsule Take 1.25 mg by mouth 1 day a week. Monday      acetaminophen (TYLENOL) 500 MG tablet Take 500 mg by mouth every 6 hours as needed for Pain.      rifAXIMin (XIFAXAN) 550 MG Tab Take 550 mg by mouth every 8 hours.      nystatin-triamcinolone (MYCOLOG II) 977606-8.1 UNIT/GM-% ointment Apply 1 application topically daily as needed.      isosorbide mononitrate (IMDUR) 30 MG 24 hr tablet Take 1 tablet by mouth daily.  Qty: 60 tablet, Refills: 0      venlafaxine XR (EFFEXOR XR) 75 MG 24 hr capsule Take 75 mg by mouth daily.      furosemide (LASIX) 80 MG tablet Take 80 mg by mouth 2 times daily.      ondansetron (ZOFRAN ODT) 4 MG disintegrating tablet Place 4 mg onto the tongue every 8 hours as needed for Nausea.      hydrALAZINE (APRESOLINE) 50 MG tablet Take 50 mg by mouth in the morning and 50 mg at noon and 50 mg in the evening.      NIFEdipine XL (PROCARDIA XL) 60 MG 24 hr tablet Take 1 tablet by mouth daily.  Qty: 60 tablet, Refills: 0      diclofenac (VOLTAREN) 1 % gel Apply 2 g topically 4 times daily.  Qty: 100 g, Refills: 0      losartan (COZAAR) 100 MG tablet Take 1 tablet by mouth daily. Do not start before November 21, 2022.  Qty: 90 tablet, Refills: 0      ferrous sulfate 325 (65 FE) MG tablet Take 1 tablet by mouth daily (with breakfast).  Qty: 30 tablet, Refills: 3      zolpidem (AMBIEN) 10 MG tablet Take 10 mg by mouth nightly as needed for Sleep.      umeclidinium bromide (INCRUSE ELLIPTA) 62.5 MCG/INH inhaler Inhale 1 puff into the lungs daily.  Qty: 30 each, Refills: 1      folic acid (FOLATE) 400 MCG tablet Take 666  Patient tolerated procedure well. mcg by mouth daily. 666 mcg per patient      atorvastatin (LIPITOR) 40 MG tablet Take 40 mg by mouth daily.      sevelamer carbonate (RENVELA) 800 MG tablet Take 1,600 mg by mouth in the morning and 1,600 mg at noon and 1,600 mg in the evening. Take with meals.             Past Medical History:   Diagnosis Date   • Anemia    • Anxiety    • Atrial flutter (CMS/[facility])    • Cardiomegaly    • Cataract    • Cellulitis     infectious cellulitis of lower extremities   • Cerebral infarction (CMS/[facility]) 2004    left side weakness with slight left foot drop, uses a cane   • Chronic kidney disease    • Chronic pain    • Coronary artery disease    • COVID-19 virus infection    • Depression    • Diet-controlled diabetes mellitus (CMS/[facility])    • DVT (deep venous thrombosis) (CMS/[facility])    • ESRD (end stage renal disease) on dialysis (CMS/[facility])     Tuesday, thursday saturdays   • Essential (primary) hypertension    • Former smoker    • Gastritis    • GI hemorrhage    • Gout    • Hemodialysis access, AV graft (CMS/[facility])    • High cholesterol    • Hyperlipoproteinemia    • Hypothyroid    • Ischemic cardiomyopathy    • Left ventricular systolic dysfunction    • Lower extremity edema    • Mitral valve regurgitation    • Morbid obesity (CMS/[facility])    • MRSA (methicillin resistant Staphylococcus aureus)    • Pericardial effusion    • Pulmonary embolism (CMS/[facility]) 05/2020   • Pulmonic valve regurgitation    • Right bundle branch block    • Sleep apnea     her machine broke and she never replaced it.    • Status post placement of implantable loop recorder        Past Surgical History:   Procedure Laterality Date   • CABG, ARTERY-VEIN, THREE  04/23/2020   • CARDIAC CATHERIZATION     • DRAIN SKIN ABSCESS COMPLIC      from Right buttock and left inner thigh   • IVC FILTER PLACEMENT     • LOOP RECORDER PLACEMENT     • NM HEMODIALYSIS VIA AV GRAFT Right    • THROMBECTOMY  09/27/2019    of right AV graft       Family History   Problem Relation Age of Onset    • Cancer Mother    • COPD Mother    • Stroke Father    • Hypertension Father    • Hyperlipidemia Father    • Pacemaker Paternal Grandparent        Social History     Tobacco Use   • Smoking status: Former     Types: Cigarettes     Quit date:      Years since quittin.1   • Smokeless tobacco: Never   Vaping Use   • Vaping Use: never used   Substance Use Topics   • Alcohol use: Not Currently   • Drug use: Never       Review of Systems    Physical Exam     ED Triage Vitals [23 0230]   ED Triage Vitals Group      Temp 97.6 °F (36.4 °C)      Heart Rate (!) 118      Resp (!) 32      BP (!) 204/103      SpO2 97 %      EtCO2 mmHg       Height       Weight       Weight Scale Used       BMI (Calculated)       IBW/kg (Calculated)        Physical Exam    ED Course     Procedures    Lab Results     Results for orders placed or performed during the hospital encounter of 23   Comprehensive Metabolic Panel   Result Value Ref Range    Fasting Status      Sodium 139 135 - 145 mmol/L    Potassium 4.8 3.4 - 5.1 mmol/L    Chloride 97 97 - 110 mmol/L    Carbon Dioxide 28 21 - 32 mmol/L    Anion Gap 19 7 - 19 mmol/L    Glucose 128 (H) 70 - 99 mg/dL    BUN 89 (H) 6 - 20 mg/dL    Creatinine 8.14 (H) 0.51 - 0.95 mg/dL    Glomerular Filtration Rate 5 (L) >=60    BUN/ Creatinine Ratio 11 7 - 25    Calcium 8.9 8.4 - 10.2 mg/dL    Bilirubin, Total 0.5 0.2 - 1.0 mg/dL    GOT/AST 31 <=37 Units/L    GPT/ALT 26 <64 Units/L    Alkaline Phosphatase 82 45 - 117 Units/L    Albumin 3.9 3.6 - 5.1 g/dL    Protein, Total 7.9 6.4 - 8.2 g/dL    Globulin 4.0 2.0 - 4.0 g/dL    A/G Ratio 1.0 1.0 - 2.4   TROPONIN I, HIGH SENSITIVITY   Result Value Ref Range    Troponin I, High Sensitivity 209 (HH) <52 ng/L   CBC with Automated Differential (performable only)   Result Value Ref Range    WBC 8.8 4.2 - 11.0 K/mcL    RBC 3.12 (L) 4.00 - 5.20 mil/mcL    HGB 9.5 (L) 12.0 - 15.5 g/dL    HCT 29.7 (L) 36.0 - 46.5 %    MCV 95.2 78.0 - 100.0 fl    MCH  30.4 26.0 - 34.0 pg    MCHC 32.0 32.0 - 36.5 g/dL    RDW-CV 14.4 11.0 - 15.0 %    RDW-SD 50.4 (H) 39.0 - 50.0 fL     140 - 450 K/mcL    NRBC 0 <=0 /100 WBC    Neutrophil, Percent 80 %    Lymphocytes, Percent 9 %    Mono, Percent 6 %    Eosinophils, Percent 3 %    Basophils, Percent 1 %    Immature Granulocytes 1 %    Absolute Neutrophils 7.1 1.8 - 7.7 K/mcL    Absolute Lymphocytes 0.8 (L) 1.0 - 4.0 K/mcL    Absolute Monocytes 0.5 0.3 - 0.9 K/mcL    Absolute Eosinophils  0.2 0.0 - 0.5 K/mcL    Absolute Basophils 0.1 0.0 - 0.3 K/mcL    Absolute Immmature Granulocytes 0.0 0.0 - 0.2 K/mcL   Prothrombin Time   Result Value Ref Range    Prothrombin Time 11.4 9.7 - 11.8 sec    INR 1.1     Partial Thromboplastin Time   Result Value Ref Range    PTT 29 22 - 30 sec   NT proBNP   Result Value Ref Range    NT-proBNP 62,720 (H) <=125 pg/mL   Magnesium   Result Value Ref Range    Magnesium 1.8 1.7 - 2.4 mg/dL   COVID/Flu/RSV panel   Result Value Ref Range    Rapid SARS-COV-2 by PCR Not Detected Not Detected / Detected / Presumptive Positive / Inhibitors present    Influenza A by PCR Not Detected Not Detected    Influenza B by PCR Not Detected Not Detected    RSV BY PCR Not Detected Not Detected    Isolation Guidelines      Procedural Comment         EKG Results         EKG tracing interpreted by ED physician  Encounter Date: 02/14/23   Electrocardiogram 12-Lead   Result Value    Ventricular Rate EKG/Min (BPM) 115    Atrial Rate (BPM) 115    AR-Interval (MSEC) 170    QRS-Interval (MSEC) 156    QT-Interval (MSEC) 350    QTc 484    P Axis (Degrees) 76    R Axis (Degrees) -31    T Axis (Degrees) 122    REPORT TEXT      Sinus tachycardia  Left axis deviation  Right bundle branch block  Inferior infarct  (cited on or before  08-NOV-2022)  Anterolateral infarct  , age undetermined  Abnormal ECG  When compared with ECG of  08-FEB-2023 23:05,  Anterior infarct  is now  present  Anterolateral infarct  is now  present          Radiology Results     Imaging Results          CTA CHEST PULMONARY EMBOLISM (In process)                XR CHEST PA OR AP 1 VIEW (In process)                 ED Medication Orders (From admission, onward)    None          ED Course as of 02/14/23 0553   Tue Feb 14, 2023   0307 I evaluated this patient's EKG, sinus tachycardia with rate 115, largely unchanged morphology compared to prior with no acute worrisome ST elevation/depressions or T wave inversions [NS]   0339 Called by RN, patient recent increase in anxiety.  Suspect this is secondary to hypoxia.  Given history of fluid overload with B-lines, concerning for recurrent fluid overload.  However will refrain from BiPAP at this juncture given patient has convincing presentation for pulmonary embolism and this could worsen right heart strain.  Explained CT PE, will place on high flow nasal cannula pending study [NS]   0444 COVID/Flu/RSV panel:    SARS-CoV-2 by PCR Not Detected   INFLUENZA A BY PCR Not Detected   INFLUENZA B BY PCR Not Detected   RSV by PCR Not Detected [NS]   0444 NT proBNP(!): 62,720 [NS]   0444 Troponin I, High Sensitivity(!!): 209 [NS]   0449 XR CHEST PA OR AP 1 VIEW  Suspect secondary to demand chest x-ray showing diffuse opacities, BNP significantly elevated.  Will place on BiPAP given overall clinical suspicion for fluid overload [NS]   0453 Nephrology paged for HD [NS]   0505 Admitted to [facility] [NS]      ED Course User Index  [NS] Darrion Dunaway MD       MDM      pt of above age and history, presents to the ed due to SOB that is getting worse, increase fatigue, concerned about the symptoms based on risk factors, will do labs, imaging, place pt under close monitor and observation.  Concern about ACS, CHF, Asthma/COPD exacerbation, fluid overload, PE, ESRD Fluid overload, pneumonia, or any other cardiac/pulmonary pathology.  Pt will most likely need to be admitted with cards on consult, will follow up  carefully    345AM  Given the hypoxia and diff breathing, will place on high flow nasal canula, consider bipap if no improvement, pending xray and CT PE study.  Follow up closely        CRITICAL CARE TIME - 35 minutes  pt presents with acute distress, concern about worsening condition, impending doom (resp and/or cardiac failure due to condition/symptoms), will closely monitor, give immediate treatment, educate parents, and follow up closely with multiple repeat examination     I saw the pt with the resident and reviewed the residents notes, I have discussed at length the management of the pt with the resident, I closely supervised the resident management and treatment of the pt    Clinical Impression     ED Diagnosis   1. Shortness of breath        2. Hypoxia        3. Acute on chronic congestive heart failure, unspecified heart failure type (CMS/[facility])        4. ESRD (end stage renal disease) (CMS/[facility])            Disposition        Admit 2/14/2023  5:05 AM  Telemetry Bed?: Yes  Admitting Physician: SREE PRICE [[id number]]  Is this a telephone or verbal order?: This is a telephone order from the admitting physician  Transferring Patient to? Only adjust for transfers between [facility] [[id number]]     Jimbo Adams MD  02/14/23 0549

## 2023-02-23 ENCOUNTER — APPOINTMENT (OUTPATIENT)
Dept: INFECTIOUS DISEASE | Facility: CLINIC | Age: 83
End: 2023-02-23
Payer: MEDICARE

## 2023-02-23 VITALS
HEIGHT: 70 IN | HEART RATE: 128 BPM | DIASTOLIC BLOOD PRESSURE: 81 MMHG | TEMPERATURE: 98 F | SYSTOLIC BLOOD PRESSURE: 167 MMHG | WEIGHT: 219 LBS | BODY MASS INDEX: 31.35 KG/M2 | OXYGEN SATURATION: 89 %

## 2023-02-23 PROCEDURE — 99213 OFFICE O/P EST LOW 20 MIN: CPT

## 2023-02-23 NOTE — ASSESSMENT
[FreeTextEntry1] : Most likely he has the same collection in left femur but since not sign of cellulitis or open wound or discharge I would continue cipro for now, if worsening, fever or any new symptoms will come back to ED at [facility] for another IR drainage. \par \par Patient was given the opportunity to ask questions and all questions were answered to their satisfaction.\par Counseling included lab results, differential diagnosis, treatment options, risks and benefits, lifestyle changes, current condition, medications and dose adjustments.\par The patient verbalized understanding.\par  [Treatment Education] : treatment education [Treatment Adherence] : treatment adherence [Rx Dose / Side Effects] : Rx dose/side effects [Risk Reduction] : risk reduction [Universal Precautions] : universal precautions [Drug Interactions / Side Effects] : drug interactions/side effects [Anticipatory Guidance] : anticipatory guidance

## 2023-02-23 NOTE — PHYSICAL EXAM
[General Appearance - Alert] : alert [General Appearance - In No Acute Distress] : in no acute distress [Sclera] : the sclera and conjunctiva were normal [PERRL With Normal Accommodation] : pupils were equal in size, round, reactive to light [Extraocular Movements] : extraocular movements were intact [Outer Ear] : the ears and nose were normal in appearance [Oropharynx] : the oropharynx was normal with no thrush [Auscultation Breath Sounds / Voice Sounds] : lungs were clear to auscultation bilaterally [Heart Rate And Rhythm] : heart rate was normal and rhythm regular [Heart Sounds] : normal S1 and S2 [Heart Sounds Gallop] : no gallops [Murmurs] : no murmurs [Heart Sounds Pericardial Friction Rub] : no pericardial rub [Full Pulse] : the pedal pulses are present [Edema] : there was no peripheral edema [Bowel Sounds] : normal bowel sounds [Abdomen Soft] : soft [Abdomen Tenderness] : non-tender [] : no hepato-splenomegaly [Abdomen Mass (___ Cm)] : no abdominal mass palpated [Costovertebral Angle Tenderness] : no CVA tenderness [No Palpable Adenopathy] : no palpable adenopathy [FreeTextEntry1] : no discharge from wound. completely closed. some fluctuation an swelling n area but no sign of cellulitis.  [Deep Tendon Reflexes (DTR)] : deep tendon reflexes were 2+ and symmetric [Sensation] : the sensory exam was normal to light touch and pinprick [No Focal Deficits] : no focal deficits [Oriented To Time, Place, And Person] : oriented to person, place, and time [Affect] : the affect was normal

## 2023-02-23 NOTE — HISTORY OF PRESENT ILLNESS
[FreeTextEntry1] : 81yo man with PMH of HTN, COPD on home O2, CAD s/p CABG, PVD s/p stents in b/l\par legs and left femoral fracture more than 50 years ago, was admitted at [facility] in 12/2021 with left\par leg pain on the site of old fracture after CT showed possible intramedullary\par abscess. He had pain and infection in the old fracture area at least 3-4\par times in the past, had multiple surgeries and drainage of area with a long course of\par antibiotic treatment.\par MRI showed collection, intraosseous abscess\par S/P IR drain placement on 12/27\par IR culture NGTD unclear if due to ABx use prior to drainage?\par Completed 6 weeks of ceftriaxone 2gm with PICC line in 1/2022\par He was seen in the clinic and was started on suppressive ciprofloxacin for good oral bioavailability and also based on the culture with a sensitive Enterobacter.\par MRI recently done showed 12.9cm collection with OM. \par He stopped cipro sometimes last year and now feels more pain and swelling in area. \par No fever or chills, no open wound or drainage in the area. \par Wife states that lex she noted that started cipro again last week.\par \par

## 2023-02-27 ENCOUNTER — APPOINTMENT (OUTPATIENT)
Dept: VASCULAR SURGERY | Facility: CLINIC | Age: 83
End: 2023-02-27
Payer: MEDICARE

## 2023-02-27 PROCEDURE — 99214 OFFICE O/P EST MOD 30 MIN: CPT

## 2023-02-27 PROCEDURE — 93925 LOWER EXTREMITY STUDY: CPT

## 2023-03-04 LAB
ALBUMIN SERPL ELPH-MCNC: 4.2 G/DL
ALP BLD-CCNC: 105 U/L
ALT SERPL-CCNC: 13 U/L
ANION GAP SERPL CALC-SCNC: 21 MMOL/L
APTT BLD: 39.5 SEC
AST SERPL-CCNC: 15 U/L
BASOPHILS # BLD AUTO: 0.02 K/UL
BASOPHILS NFR BLD AUTO: 0.2 %
BILIRUB SERPL-MCNC: 0.4 MG/DL
BUN SERPL-MCNC: 20 MG/DL
CALCIUM SERPL-MCNC: 10.3 MG/DL
CHLORIDE SERPL-SCNC: 100 MMOL/L
CO2 SERPL-SCNC: 22 MMOL/L
CREAT SERPL-MCNC: 1.52 MG/DL
EGFR: 45 ML/MIN/1.73M2
EOSINOPHIL # BLD AUTO: 0.05 K/UL
EOSINOPHIL NFR BLD AUTO: 0.6 %
GLUCOSE SERPL-MCNC: 183 MG/DL
HCT VFR BLD CALC: 47.2 %
HGB BLD-MCNC: 13.8 G/DL
IMM GRANULOCYTES NFR BLD AUTO: 0.4 %
INR PPP: 1.05 RATIO
LYMPHOCYTES # BLD AUTO: 1.9 K/UL
LYMPHOCYTES NFR BLD AUTO: 21 %
MAN DIFF?: NORMAL
MCHC RBC-ENTMCNC: 27.9 PG
MCHC RBC-ENTMCNC: 29.2 GM/DL
MCV RBC AUTO: 95.5 FL
MONOCYTES # BLD AUTO: 0.97 K/UL
MONOCYTES NFR BLD AUTO: 10.7 %
NEUTROPHILS # BLD AUTO: 6.06 K/UL
NEUTROPHILS NFR BLD AUTO: 67.1 %
PLATELET # BLD AUTO: 386 K/UL
POTASSIUM SERPL-SCNC: 4.5 MMOL/L
PROT SERPL-MCNC: 6.5 G/DL
PT BLD: 12.3 SEC
RBC # BLD: 4.94 M/UL
RBC # FLD: 12.9 %
SARS-COV-2 N GENE NPH QL NAA+PROBE: NOT DETECTED
SODIUM SERPL-SCNC: 143 MMOL/L
WBC # FLD AUTO: 9.04 K/UL

## 2023-03-06 ENCOUNTER — INPATIENT (INPATIENT)
Facility: HOSPITAL | Age: 83
LOS: 15 days | Discharge: HOME | DRG: 637 | End: 2023-03-22
Attending: HOSPITALIST | Admitting: HOSPITALIST
Payer: COMMERCIAL

## 2023-03-06 VITALS
DIASTOLIC BLOOD PRESSURE: 63 MMHG | TEMPERATURE: 98 F | OXYGEN SATURATION: 97 % | SYSTOLIC BLOOD PRESSURE: 96 MMHG | RESPIRATION RATE: 18 BRPM | HEART RATE: 90 BPM | WEIGHT: 214.95 LBS | HEIGHT: 71 IN

## 2023-03-06 DIAGNOSIS — Z95.5 PRESENCE OF CORONARY ANGIOPLASTY IMPLANT AND GRAFT: Chronic | ICD-10-CM

## 2023-03-06 DIAGNOSIS — T14.8XXA OTHER INJURY OF UNSPECIFIED BODY REGION, INITIAL ENCOUNTER: Chronic | ICD-10-CM

## 2023-03-06 DIAGNOSIS — I10 ESSENTIAL (PRIMARY) HYPERTENSION: ICD-10-CM

## 2023-03-06 DIAGNOSIS — L03.116 CELLULITIS OF LEFT LOWER LIMB: ICD-10-CM

## 2023-03-06 DIAGNOSIS — Z98.890 OTHER SPECIFIED POSTPROCEDURAL STATES: Chronic | ICD-10-CM

## 2023-03-06 DIAGNOSIS — E11.9 TYPE 2 DIABETES MELLITUS WITHOUT COMPLICATIONS: ICD-10-CM

## 2023-03-06 DIAGNOSIS — M86.60 OTHER CHRONIC OSTEOMYELITIS, UNSPECIFIED SITE: ICD-10-CM

## 2023-03-06 DIAGNOSIS — Z86.79 PERSONAL HISTORY OF OTHER DISEASES OF THE CIRCULATORY SYSTEM: ICD-10-CM

## 2023-03-06 DIAGNOSIS — N40.1 BENIGN PROSTATIC HYPERPLASIA WITH LOWER URINARY TRACT SYMPTOMS: ICD-10-CM

## 2023-03-06 DIAGNOSIS — J44.9 CHRONIC OBSTRUCTIVE PULMONARY DISEASE, UNSPECIFIED: ICD-10-CM

## 2023-03-06 DIAGNOSIS — I25.10 ATHEROSCLEROTIC HEART DISEASE OF NATIVE CORONARY ARTERY WITHOUT ANGINA PECTORIS: ICD-10-CM

## 2023-03-06 LAB
ALBUMIN SERPL ELPH-MCNC: 2.9 G/DL — LOW (ref 3.3–5)
ALBUMIN SERPL ELPH-MCNC: 3.4 G/DL — SIGNIFICANT CHANGE UP (ref 3.3–5)
ALP SERPL-CCNC: 86 U/L — SIGNIFICANT CHANGE UP (ref 40–120)
ALP SERPL-CCNC: 97 U/L — SIGNIFICANT CHANGE UP (ref 40–120)
ALT FLD-CCNC: 11 U/L — LOW (ref 12–78)
ALT FLD-CCNC: 14 U/L — SIGNIFICANT CHANGE UP (ref 12–78)
ANION GAP SERPL CALC-SCNC: 2 MMOL/L — LOW (ref 5–17)
ANION GAP SERPL CALC-SCNC: 4 MMOL/L — LOW (ref 5–17)
APPEARANCE UR: CLEAR — SIGNIFICANT CHANGE UP
APTT BLD: 39.1 SEC — HIGH (ref 27.5–35.5)
AST SERPL-CCNC: 11 U/L — LOW (ref 15–37)
AST SERPL-CCNC: 18 U/L — SIGNIFICANT CHANGE UP (ref 15–37)
BASOPHILS # BLD AUTO: 0.03 K/UL — SIGNIFICANT CHANGE UP (ref 0–0.2)
BASOPHILS NFR BLD AUTO: 0.4 % — SIGNIFICANT CHANGE UP (ref 0–2)
BILIRUB SERPL-MCNC: 0.3 MG/DL — SIGNIFICANT CHANGE UP (ref 0.2–1.2)
BILIRUB SERPL-MCNC: 0.3 MG/DL — SIGNIFICANT CHANGE UP (ref 0.2–1.2)
BILIRUB UR-MCNC: NEGATIVE — SIGNIFICANT CHANGE UP
BUN SERPL-MCNC: 19 MG/DL — SIGNIFICANT CHANGE UP (ref 7–23)
BUN SERPL-MCNC: 19 MG/DL — SIGNIFICANT CHANGE UP (ref 7–23)
CALCIUM SERPL-MCNC: 9.3 MG/DL — SIGNIFICANT CHANGE UP (ref 8.5–10.1)
CALCIUM SERPL-MCNC: 9.9 MG/DL — SIGNIFICANT CHANGE UP (ref 8.5–10.1)
CHLORIDE SERPL-SCNC: 106 MMOL/L — SIGNIFICANT CHANGE UP (ref 96–108)
CHLORIDE SERPL-SCNC: 107 MMOL/L — SIGNIFICANT CHANGE UP (ref 96–108)
CO2 SERPL-SCNC: 29 MMOL/L — SIGNIFICANT CHANGE UP (ref 22–31)
CO2 SERPL-SCNC: 32 MMOL/L — HIGH (ref 22–31)
COLOR SPEC: YELLOW — SIGNIFICANT CHANGE UP
CREAT SERPL-MCNC: 1.5 MG/DL — HIGH (ref 0.5–1.3)
CREAT SERPL-MCNC: 1.6 MG/DL — HIGH (ref 0.5–1.3)
CRP SERPL-MCNC: 45 MG/L — HIGH
DIFF PNL FLD: NEGATIVE — SIGNIFICANT CHANGE UP
EGFR: 42 ML/MIN/1.73M2 — LOW
EGFR: 46 ML/MIN/1.73M2 — LOW
EOSINOPHIL # BLD AUTO: 0.03 K/UL — SIGNIFICANT CHANGE UP (ref 0–0.5)
EOSINOPHIL NFR BLD AUTO: 0.4 % — SIGNIFICANT CHANGE UP (ref 0–6)
ERYTHROCYTE [SEDIMENTATION RATE] IN BLOOD: 39 MM/HR — HIGH (ref 0–20)
FLUAV AG NPH QL: SIGNIFICANT CHANGE UP
FLUBV AG NPH QL: SIGNIFICANT CHANGE UP
GLUCOSE SERPL-MCNC: 157 MG/DL — HIGH (ref 70–99)
GLUCOSE SERPL-MCNC: 197 MG/DL — HIGH (ref 70–99)
GLUCOSE UR QL: 1000 MG/DL
HCT VFR BLD CALC: 40.6 % — SIGNIFICANT CHANGE UP (ref 39–50)
HCT VFR BLD CALC: 43.9 % — SIGNIFICANT CHANGE UP (ref 39–50)
HGB BLD-MCNC: 12.1 G/DL — LOW (ref 13–17)
HGB BLD-MCNC: 13.1 G/DL — SIGNIFICANT CHANGE UP (ref 13–17)
IMM GRANULOCYTES NFR BLD AUTO: 0.8 % — SIGNIFICANT CHANGE UP (ref 0–0.9)
INR BLD: 1.08 RATIO — SIGNIFICANT CHANGE UP (ref 0.88–1.16)
KETONES UR-MCNC: NEGATIVE — SIGNIFICANT CHANGE UP
LACTATE SERPL-SCNC: 1.4 MMOL/L — SIGNIFICANT CHANGE UP (ref 0.7–2)
LEUKOCYTE ESTERASE UR-ACNC: NEGATIVE — SIGNIFICANT CHANGE UP
LYMPHOCYTES # BLD AUTO: 1.34 K/UL — SIGNIFICANT CHANGE UP (ref 1–3.3)
LYMPHOCYTES # BLD AUTO: 19 % — SIGNIFICANT CHANGE UP (ref 13–44)
MCHC RBC-ENTMCNC: 27 PG — SIGNIFICANT CHANGE UP (ref 27–34)
MCHC RBC-ENTMCNC: 27.1 PG — SIGNIFICANT CHANGE UP (ref 27–34)
MCHC RBC-ENTMCNC: 29.8 GM/DL — LOW (ref 32–36)
MCHC RBC-ENTMCNC: 29.8 GM/DL — LOW (ref 32–36)
MCV RBC AUTO: 90.5 FL — SIGNIFICANT CHANGE UP (ref 80–100)
MCV RBC AUTO: 90.8 FL — SIGNIFICANT CHANGE UP (ref 80–100)
MONOCYTES # BLD AUTO: 0.86 K/UL — SIGNIFICANT CHANGE UP (ref 0–0.9)
MONOCYTES NFR BLD AUTO: 12.2 % — SIGNIFICANT CHANGE UP (ref 2–14)
NEUTROPHILS # BLD AUTO: 4.74 K/UL — SIGNIFICANT CHANGE UP (ref 1.8–7.4)
NEUTROPHILS NFR BLD AUTO: 67.2 % — SIGNIFICANT CHANGE UP (ref 43–77)
NITRITE UR-MCNC: NEGATIVE — SIGNIFICANT CHANGE UP
NRBC # BLD: 0 /100 WBCS — SIGNIFICANT CHANGE UP (ref 0–0)
NRBC # BLD: 0 /100 WBCS — SIGNIFICANT CHANGE UP (ref 0–0)
PH UR: 5 — SIGNIFICANT CHANGE UP (ref 5–8)
PLATELET # BLD AUTO: 335 K/UL — SIGNIFICANT CHANGE UP (ref 150–400)
PLATELET # BLD AUTO: 379 K/UL — SIGNIFICANT CHANGE UP (ref 150–400)
POTASSIUM SERPL-MCNC: 4.3 MMOL/L — SIGNIFICANT CHANGE UP (ref 3.5–5.3)
POTASSIUM SERPL-MCNC: 4.7 MMOL/L — SIGNIFICANT CHANGE UP (ref 3.5–5.3)
POTASSIUM SERPL-SCNC: 4.3 MMOL/L — SIGNIFICANT CHANGE UP (ref 3.5–5.3)
POTASSIUM SERPL-SCNC: 4.7 MMOL/L — SIGNIFICANT CHANGE UP (ref 3.5–5.3)
PROT SERPL-MCNC: 6.5 G/DL — SIGNIFICANT CHANGE UP (ref 6–8.3)
PROT SERPL-MCNC: 7.5 G/DL — SIGNIFICANT CHANGE UP (ref 6–8.3)
PROT UR-MCNC: NEGATIVE — SIGNIFICANT CHANGE UP
PROTHROM AB SERPL-ACNC: 12.6 SEC — SIGNIFICANT CHANGE UP (ref 10.5–13.4)
RBC # BLD: 4.47 M/UL — SIGNIFICANT CHANGE UP (ref 4.2–5.8)
RBC # BLD: 4.85 M/UL — SIGNIFICANT CHANGE UP (ref 4.2–5.8)
RBC # FLD: 12.6 % — SIGNIFICANT CHANGE UP (ref 10.3–14.5)
RBC # FLD: 12.7 % — SIGNIFICANT CHANGE UP (ref 10.3–14.5)
RSV RNA NPH QL NAA+NON-PROBE: SIGNIFICANT CHANGE UP
SARS-COV-2 RNA SPEC QL NAA+PROBE: SIGNIFICANT CHANGE UP
SODIUM SERPL-SCNC: 139 MMOL/L — SIGNIFICANT CHANGE UP (ref 135–145)
SODIUM SERPL-SCNC: 141 MMOL/L — SIGNIFICANT CHANGE UP (ref 135–145)
SP GR SPEC: 1.01 — SIGNIFICANT CHANGE UP (ref 1.01–1.02)
UROBILINOGEN FLD QL: NEGATIVE — SIGNIFICANT CHANGE UP
WBC # BLD: 7.06 K/UL — SIGNIFICANT CHANGE UP (ref 3.8–10.5)
WBC # BLD: 7.17 K/UL — SIGNIFICANT CHANGE UP (ref 3.8–10.5)
WBC # FLD AUTO: 7.06 K/UL — SIGNIFICANT CHANGE UP (ref 3.8–10.5)
WBC # FLD AUTO: 7.17 K/UL — SIGNIFICANT CHANGE UP (ref 3.8–10.5)

## 2023-03-06 PROCEDURE — 71045 X-RAY EXAM CHEST 1 VIEW: CPT | Mod: 26

## 2023-03-06 PROCEDURE — 99223 1ST HOSP IP/OBS HIGH 75: CPT

## 2023-03-06 PROCEDURE — 99222 1ST HOSP IP/OBS MODERATE 55: CPT

## 2023-03-06 PROCEDURE — 93010 ELECTROCARDIOGRAM REPORT: CPT

## 2023-03-06 PROCEDURE — 99285 EMERGENCY DEPT VISIT HI MDM: CPT

## 2023-03-06 PROCEDURE — 73552 X-RAY EXAM OF FEMUR 2/>: CPT | Mod: 26,LT

## 2023-03-06 RX ORDER — DEXTROSE 50 % IN WATER 50 %
12.5 SYRINGE (ML) INTRAVENOUS ONCE
Refills: 0 | Status: DISCONTINUED | OUTPATIENT
Start: 2023-03-06 | End: 2023-03-22

## 2023-03-06 RX ORDER — METOPROLOL TARTRATE 50 MG
12.5 TABLET ORAL THREE TIMES A DAY
Refills: 0 | Status: DISCONTINUED | OUTPATIENT
Start: 2023-03-06 | End: 2023-03-07

## 2023-03-06 RX ORDER — TAMSULOSIN HYDROCHLORIDE 0.4 MG/1
0.4 CAPSULE ORAL AT BEDTIME
Refills: 0 | Status: DISCONTINUED | OUTPATIENT
Start: 2023-03-06 | End: 2023-03-22

## 2023-03-06 RX ORDER — SODIUM CHLORIDE 9 MG/ML
1000 INJECTION, SOLUTION INTRAVENOUS
Refills: 0 | Status: DISCONTINUED | OUTPATIENT
Start: 2023-03-06 | End: 2023-03-22

## 2023-03-06 RX ORDER — MONTELUKAST 4 MG/1
10 TABLET, CHEWABLE ORAL DAILY
Refills: 0 | Status: DISCONTINUED | OUTPATIENT
Start: 2023-03-06 | End: 2023-03-22

## 2023-03-06 RX ORDER — IPRATROPIUM/ALBUTEROL SULFATE 18-103MCG
3 AEROSOL WITH ADAPTER (GRAM) INHALATION EVERY 6 HOURS
Refills: 0 | Status: DISCONTINUED | OUTPATIENT
Start: 2023-03-06 | End: 2023-03-07

## 2023-03-06 RX ORDER — DEXTROSE 50 % IN WATER 50 %
25 SYRINGE (ML) INTRAVENOUS ONCE
Refills: 0 | Status: DISCONTINUED | OUTPATIENT
Start: 2023-03-06 | End: 2023-03-22

## 2023-03-06 RX ORDER — INSULIN GLARGINE 100 [IU]/ML
15 INJECTION, SOLUTION SUBCUTANEOUS AT BEDTIME
Refills: 0 | Status: DISCONTINUED | OUTPATIENT
Start: 2023-03-06 | End: 2023-03-19

## 2023-03-06 RX ORDER — SODIUM CHLORIDE 9 MG/ML
1000 INJECTION, SOLUTION INTRAVENOUS
Refills: 0 | Status: DISCONTINUED | OUTPATIENT
Start: 2023-03-06 | End: 2023-03-06

## 2023-03-06 RX ORDER — APIXABAN 2.5 MG/1
2.5 TABLET, FILM COATED ORAL EVERY 12 HOURS
Refills: 0 | Status: DISCONTINUED | OUTPATIENT
Start: 2023-03-06 | End: 2023-03-09

## 2023-03-06 RX ORDER — INSULIN LISPRO 100/ML
VIAL (ML) SUBCUTANEOUS
Refills: 0 | Status: DISCONTINUED | OUTPATIENT
Start: 2023-03-06 | End: 2023-03-12

## 2023-03-06 RX ORDER — CEFEPIME 1 G/1
2000 INJECTION, POWDER, FOR SOLUTION INTRAMUSCULAR; INTRAVENOUS EVERY 12 HOURS
Refills: 0 | Status: DISCONTINUED | OUTPATIENT
Start: 2023-03-07 | End: 2023-03-15

## 2023-03-06 RX ORDER — MEPOLIZUMAB 100 MG/ML
100 INJECTION, SOLUTION SUBCUTANEOUS
Refills: 0 | Status: DISCONTINUED | OUTPATIENT
Start: 2023-03-06 | End: 2023-03-07

## 2023-03-06 RX ORDER — CEFEPIME 1 G/1
INJECTION, POWDER, FOR SOLUTION INTRAMUSCULAR; INTRAVENOUS
Refills: 0 | Status: DISCONTINUED | OUTPATIENT
Start: 2023-03-06 | End: 2023-03-15

## 2023-03-06 RX ORDER — GLUCAGON INJECTION, SOLUTION 0.5 MG/.1ML
1 INJECTION, SOLUTION SUBCUTANEOUS ONCE
Refills: 0 | Status: DISCONTINUED | OUTPATIENT
Start: 2023-03-06 | End: 2023-03-22

## 2023-03-06 RX ORDER — DEXTROSE 50 % IN WATER 50 %
15 SYRINGE (ML) INTRAVENOUS ONCE
Refills: 0 | Status: DISCONTINUED | OUTPATIENT
Start: 2023-03-06 | End: 2023-03-22

## 2023-03-06 RX ORDER — CEFEPIME 1 G/1
2000 INJECTION, POWDER, FOR SOLUTION INTRAMUSCULAR; INTRAVENOUS ONCE
Refills: 0 | Status: COMPLETED | OUTPATIENT
Start: 2023-03-06 | End: 2023-03-06

## 2023-03-06 RX ORDER — ATORVASTATIN CALCIUM 80 MG/1
20 TABLET, FILM COATED ORAL AT BEDTIME
Refills: 0 | Status: DISCONTINUED | OUTPATIENT
Start: 2023-03-06 | End: 2023-03-22

## 2023-03-06 RX ORDER — AZITHROMYCIN 500 MG/1
1 TABLET, FILM COATED ORAL
Qty: 0 | Refills: 0 | DISCHARGE

## 2023-03-06 RX ADMIN — Medication 12.5 MILLIGRAM(S): at 23:02

## 2023-03-06 RX ADMIN — INSULIN GLARGINE 15 UNIT(S): 100 INJECTION, SOLUTION SUBCUTANEOUS at 23:03

## 2023-03-06 RX ADMIN — ATORVASTATIN CALCIUM 20 MILLIGRAM(S): 80 TABLET, FILM COATED ORAL at 23:02

## 2023-03-06 RX ADMIN — TAMSULOSIN HYDROCHLORIDE 0.4 MILLIGRAM(S): 0.4 CAPSULE ORAL at 23:02

## 2023-03-06 RX ADMIN — CEFEPIME 100 MILLIGRAM(S): 1 INJECTION, POWDER, FOR SOLUTION INTRAMUSCULAR; INTRAVENOUS at 20:07

## 2023-03-06 NOTE — ED ADULT TRIAGE NOTE - CHIEF COMPLAINT QUOTE
Pt arrived to ed sent by Dr Berman (ID) for admission for r/o osteomyelitis.   Pt with a chronic would to L thigh from a trauma 50 years ago.   Wife states he has had several episodes of infection to this leg including previously osteomyelitis infections.  Pt denies fevers at home.   States he was following up with ID but was told to only come to ED if wound began to drain, wife reports new yellow drainage noted since last night.  BN

## 2023-03-06 NOTE — H&P ADULT - HISTORY OF PRESENT ILLNESS
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   Patient follow with ID dr العلي for mgt of his chronic femoral osteomyelitis . Has abscess and drainage in November 2022. Present with one day of new yellowish  drainage from left thigh. No fever or chills or other constitutional symptoms.

## 2023-03-06 NOTE — ED ADULT NURSE NOTE - OBJECTIVE STATEMENT
82yr old male a&ox4 sitting up in stretcher wife noted at bedside, pt notes arrived to ED per provider for admission for r/o osteomyelitis.  chronic would to left  thigh pt and wife notes new yellow drainage to site, pt denies sob or active chest pain at this time.

## 2023-03-06 NOTE — H&P ADULT - PROBLEM SELECTOR PLAN 1
Patient said he has had two  saucerization procedure in the past   Had abscess and drainage in November 2022  Had been variously treated with antibiotics   Follows with RISHI العلي.  Will consult ID   Wound care Patient said he has had two  saucerization procedure in the past   Had abscess and drainage in November 2022  Had been variously treated with antibiotics   Follows with ID Severiano.  Will consult ID - Cefepime 2 g iv q 12. IR consult for possible drain placement   Wound care

## 2023-03-06 NOTE — ED PROVIDER NOTE - CLINICAL SUMMARY MEDICAL DECISION MAKING FREE TEXT BOX
82-year-old male with significant past medical history for coronary artery disease, hyperlipidemia, COPD, osteomyelitis, type 2 diabetes, recurrent left thigh wound.  Patient was sent in from PCP for left thigh wound infection that has failed outpatient treatment.  Patient was placed on oral antibiotics of Cipro with no relief of symptoms now with purulent drainage.  Patient denies any fevers.  Septic labs, blood cultures, x-ray, admit for IV antibiotics.

## 2023-03-06 NOTE — ED ADULT NURSE NOTE - NSIMPLEMENTINTERV_GEN_ALL_ED
Implemented All Fall with Harm Risk Interventions:  [place] to call system. Call bell, personal items and telephone within reach. Instruct patient to call for assistance. Room bathroom lighting operational. Non-slip footwear when patient is off stretcher. Physically safe environment: no spills, clutter or unnecessary equipment. Stretcher in lowest position, wheels locked, appropriate side rails in place. Provide visual cue, wrist band, yellow gown, etc. Monitor gait and stability. Monitor for mental status changes and reorient to person, place, and time. Review medications for side effects contributing to fall risk. Reinforce activity limits and safety measures with patient and family. Provide visual clues: red socks.

## 2023-03-06 NOTE — H&P ADULT - NSICDXPASTMEDICALHX_GEN_ALL_CORE_FT
PAST MEDICAL HISTORY:  Acute osteomyelitis     Asthma with COPD     BPH (benign prostatic hyperplasia)     CAD (Coronary Artery Disease) s/p 3v CABG 2004; stents placed in [place] in 2019    COPD (chronic obstructive pulmonary disease) on 2L at home and BiPAP at night; intubated 6/18    Diabetes Mellitus, Type II     Dyslipidemia     History of PAT (paroxysmal atrial tachycardia)     Hypertension     Osteomyelitis     PVD (peripheral vascular disease)

## 2023-03-06 NOTE — ED PROVIDER NOTE - OBJECTIVE STATEMENT
Patient is a 82-year-old male with past medical history of asthma with COPD BPH CAD diabetes hyperlipidemia hypertension PVD status post CABG status post osteomyelitis and drainage of left femur abscess with recurrent infections advised to come to emergency room by infectious disease Dr. العلي for left leg wound drainage.  Wife states drainage started about 3 days ago patient is currently on Cipro which she has been on for few weeks now.  No fever or chills no worsening pain or swelling in the leg.

## 2023-03-06 NOTE — CONSULT NOTE ADULT - SUBJECTIVE AND OBJECTIVE BOX
[facility]  INFECTIOUS DISEASES   [address] [place] [address]  Tel: [phone]     Fax: [phone]  ========================================================  MD Noman Perry Kaushal, MD Cho, Michelle, MD Sunjit, Jaspal, MD  ========================================================    [id number]  YUE      CC: left thigh wound discharge   HPI:  83yo man with PMH of HTN, COPD on home O2, CAD s/p CABG, PVD s/p stents in b/l legs and left femoral fracture more than 50 years ago, was admitted at [facility] in 2021 with left  leg pain on the site of old fracture after CT showed possible intramedullary abscess. He had pain and infection in the old fracture area at least 3-4 times in the past, had multiple surgeries   and drainage of area with a long course of antibiotic treatment.  MRI showed collection, intraosseous abscess  S/P IR drain placement on 21  IR culture NGTD but had another culture with enterobacter.   Completed 6 weeks of ceftriaxone 2gm with PICC line in 2022  He was seen in the clinic and was started on suppressive ciprofloxacin for good oral bioavailability and also based on the culture.  MRI 2022 done showed 12.9cm collection with OM.   He stopped cipro sometimes last year and now feels more pain and swelling in area and started draining again.   No fever or chills at this time. Has restarted cipro few weeks ago again.     PAST MEDICAL & SURGICAL HISTORY:  Diabetes Mellitus, Type II  CAD (Coronary Artery Disease)  s/p 3v CABG ; st[place] in   Dyslipidemia  Osteomyelitis  COPD (chronic obstructive pulmonary disease)  on 2L at home and BiPAP at night; intubated   Hypertension  PVD (peripheral vascular disease)  History of PAT (paroxysmal atrial tachycardia)  Asthma with COPD  BPH (benign prostatic hyperplasia)  Acute osteomyelitis  CABG (Coronary Artery Bypass Graft)    Compound fracture  left leg  S/P primary angioplasty with coronary stent  H/O drainage of abscess  Left femur 2021    Social Hx: No current smoking, ETOH or drugs     FAMILY HISTORY:  Family history of diabetes mellitus (Sibling)    Family hx of lung cancer  brother,  age 82, used to smoke with pt    Allergies  No Known Allergies    Intolerances  shellfish (Nausea)    Antibiotics:  Ciprofloxacin      REVIEW OF SYSTEMS:  CONSTITUTIONAL:  No Fever or chills  HEENT:  No diplopia or blurred vision.  No sore throat or runny nose.  CARDIOVASCULAR:  No chest pain or SOB.  RESPIRATORY:  No cough, shortness of breath, PND or orthopnea.  GASTROINTESTINAL:  No nausea, vomiting or diarrhea.  GENITOURINARY:  No dysuria, frequency or urgency. No Blood in urine  MUSCULOSKELETAL:  left thigh pain and drainage   SKIN:  No change in skin, hair or nails.  NEUROLOGIC:  No paresthesias or weakness.  PSYCHIATRIC:  No disorder of thought or mood.  ENDOCRINE:  No heat or cold intolerance, polyuria or polydipsia.  HEMATOLOGICAL:  No easy bruising or bleeding.     Physical Exam:  Vital Signs Last 24 Hrs  T(C): 36.7 (06 Mar 2023 15:20), Max: 36.7 (06 Mar 2023 15:20)  T(F): 98.1 (06 Mar 2023 15:20), Max: 98.1 (06 Mar 2023 15:20)  HR: 90 (06 Mar 2023 15:20) (90 - 90)  BP: 103/58 (06 Mar 2023 16:58) (96/63 - 103/58)  BP(mean): --  RR: 18 (06 Mar 2023 16:58) (18 - 18)  SpO2: 96% (06 Mar 2023 16:58) (96% - 97%)  Parameters below as of 06 Mar 2023 15:20  Patient On (Oxygen Delivery Method): room air  Height (cm): 180.3 ( @ 15:20)  Weight (kg): 97.5 ( @ 15:20)  BMI (kg/m2): 30 ( @ 15:20)  BSA (m2): 2.17 ( @ 15:20)  GEN: NAD  HEENT: normocephalic and atraumatic. EOMI. PERRL.    NECK: Supple.  No lymphadenopathy   LUNGS: poor air movement   HEART: Regular rate and rhythm   ABDOMEN: Soft, nontender, and nondistended.  Positive bowel sounds.    : No CVA tenderness  EXTREMITIES: left thigh with scar in lateral side with fluctuation and small opening with yellow discharge   NEUROLOGIC: grossly intact.  PSYCHIATRIC: Appropriate affect .  SKIN: No rash     Labs:      139  |  106  |  19  ----------------------------<  157<H>  4.7   |  29  |  1.50<H>    Ca    9.9      06 Mar 2023 15:53    TPro  7.5  /  Alb  3.4  /  TBili  0.3  /  DBili  x   /  AST  18  /  ALT  14  /  AlkPhos  97                          13.1   7.06  )-----------( 379      ( 06 Mar 2023 15:53 )             43.9     PT/INR - ( 06 Mar 2023 15:53 )   PT: 12.6 sec;   INR: 1.08 ratio    PTT - ( 06 Mar 2023 15:53 )  PTT:39.1 sec    LIVER FUNCTIONS - ( 06 Mar 2023 15:53 )  Alb: 3.4 g/dL / Pro: 7.5 g/dL / ALK PHOS: 97 U/L / ALT: 14 U/L / AST: 18 U/L / GGT: x           Sedimentation Rate, Erythrocyte: 39 mm/hr (23 @ 15:53)    SARS-CoV-2 Result: NotDetec (23 @ 15:53)    All imaging and other data have been reviewed.  < from: MR Femur No Cont, Left (22 @ 13:40) >  IMPRESSION:  Chronic osteomyelitis with deformity of bone and with scattered areas of   T2 hyperintensity, less prominent than on the previous MRI of 2021,   however cannot exclude superimposed acute osteomyelitis/intraosseous   abscess.  Fluid tract extending from the chronic bony defect is contiguous with a   soft tissue abscess centered deep to the vastus intermedius measuring  12.9 cm craniocaudally, with surrounding surrounding muscle and soft   tissue edema.    Assessment and Plan:   83yo man with PMH of HTN, COPD on home O2, CAD s/p CABG, PVD s/p stents in b/l legs and left femoral fracture more than 50 years ago, was admitted at [facility] in 2021 with left  leg pain on the site of old fracture after CT showed possible intramedullary abscess. He had pain and infection in the old fracture area at least 3-4 times in the past, had multiple surgeries   and drainage of area with a long course of antibiotic treatment.  MRI showed collection, intraosseous abscess  S/P IR drain placement on 21  IR culture NGTD but had another culture with enterobacter.   Completed 6 weeks of ceftriaxone 2gm with PICC line in 2022  He was seen in the clinic and was started on suppressive ciprofloxacin for good oral bioavailability and also based on the culture.  MRI 2022 done showed 12.9cm collection with OM.   He stopped cipro sometimes last year and now feels more pain and swelling in area and started draining again.   No fever or chills at this time. Has restarted cipro few weeks ago again.     Recommendations:  - I would admit him for IV antibiotics and possible drainage  - Will start him on cefepime 2gm q12  - IR consult for possible drain placement   - Will send cultures     Thank you for courtesy of this consult.     Will follow.  Discussed with the primary team.     Brandi العلي MD  Division of Infectious Diseases   Please call ID service at [phone] with any question.      55 minutes spent on total encounter assessing patient, examination, chart review, counseling and coordinating care by the attending physician/nurse/care manager.

## 2023-03-06 NOTE — H&P ADULT - NSHPPHYSICALEXAM_GEN_ALL_CORE
Normocephalic   EOMI PERRL  Neck supple no JVD   S1 and S2 regular   Chest No rales   Abd soft + BS not tender   No pedal edema no calf tenderness   Left thigh healed lateral midline surgical scar with superiorly placed subcenter draining sinus   AAO X3 no focal neurologic deficit   Depressed mood.  Affect is appropriate

## 2023-03-06 NOTE — ED PROVIDER NOTE - SKIN, MLM
left lateral upper thigh large scar with mild induration local redness ttp and small opening with yellow drainage left lateral upper thigh large scar with mild induration fluctuance local redness ttp and small opening with yellow drainage

## 2023-03-06 NOTE — H&P ADULT - ASSESSMENT
82 yr male with history of CAD previous CABG with 3 bypass on plavix , Hypertension, COPD  home oxygen dependent, long standing chronic left femoral osteomyelitis following traumatic fracture of  femur in 1966 , present with new drainage from left thigh chronic osteomyelitis .

## 2023-03-06 NOTE — H&P ADULT - PROBLEM SELECTOR PLAN 5
Inhaled broncho dilators   Singulair 10 mg po hs Inhaled broncho dilators   Singulair 10 mg po hs  Prednisone 20 mg po daily

## 2023-03-06 NOTE — ED ADULT NURSE NOTE - NS ED NOTE ABUSE SUSPICION NEGLECT YN
----- Message from Catarina Major sent at 4/22/2019 12:55 PM EDT -----  Regarding: Shruthi Cue: [phone]  Pt mother calling to get test results. No

## 2023-03-07 LAB
A1C WITH ESTIMATED AVERAGE GLUCOSE RESULT: 8 % — HIGH (ref 4–5.6)
ALBUMIN SERPL ELPH-MCNC: 2.7 G/DL — LOW (ref 3.3–5)
ALP SERPL-CCNC: 78 U/L — SIGNIFICANT CHANGE UP (ref 40–120)
ALT FLD-CCNC: 13 U/L — SIGNIFICANT CHANGE UP (ref 12–78)
ANION GAP SERPL CALC-SCNC: 4 MMOL/L — LOW (ref 5–17)
AST SERPL-CCNC: 10 U/L — LOW (ref 15–37)
BASE EXCESS BLDV CALC-SCNC: 2.7 MMOL/L — SIGNIFICANT CHANGE UP (ref -2–3)
BILIRUB SERPL-MCNC: 0.3 MG/DL — SIGNIFICANT CHANGE UP (ref 0.2–1.2)
BLOOD GAS COMMENTS, VENOUS: SIGNIFICANT CHANGE UP
BUN SERPL-MCNC: 16 MG/DL — SIGNIFICANT CHANGE UP (ref 7–23)
CALCIUM SERPL-MCNC: 9.1 MG/DL — SIGNIFICANT CHANGE UP (ref 8.5–10.1)
CHLORIDE SERPL-SCNC: 108 MMOL/L — SIGNIFICANT CHANGE UP (ref 96–108)
CO2 SERPL-SCNC: 30 MMOL/L — SIGNIFICANT CHANGE UP (ref 22–31)
CREAT SERPL-MCNC: 1.3 MG/DL — SIGNIFICANT CHANGE UP (ref 0.5–1.3)
CULTURE RESULTS: SIGNIFICANT CHANGE UP
EGFR: 55 ML/MIN/1.73M2 — LOW
ESTIMATED AVERAGE GLUCOSE: 183 MG/DL — HIGH (ref 68–114)
GLUCOSE SERPL-MCNC: 121 MG/DL — HIGH (ref 70–99)
HCO3 BLDV-SCNC: 28 MMOL/L — SIGNIFICANT CHANGE UP (ref 22–29)
HCT VFR BLD CALC: 37.8 % — LOW (ref 39–50)
HGB BLD-MCNC: 11.8 G/DL — LOW (ref 13–17)
MCHC RBC-ENTMCNC: 28 PG — SIGNIFICANT CHANGE UP (ref 27–34)
MCHC RBC-ENTMCNC: 31.2 GM/DL — LOW (ref 32–36)
MCV RBC AUTO: 89.6 FL — SIGNIFICANT CHANGE UP (ref 80–100)
NRBC # BLD: 0 /100 WBCS — SIGNIFICANT CHANGE UP (ref 0–0)
PCO2 BLDV: 47 MMHG — SIGNIFICANT CHANGE UP (ref 42–55)
PH BLDV: 7.38 — SIGNIFICANT CHANGE UP (ref 7.32–7.43)
PLATELET # BLD AUTO: 340 K/UL — SIGNIFICANT CHANGE UP (ref 150–400)
PO2 BLDV: 68 MMHG — HIGH (ref 25–45)
POTASSIUM SERPL-MCNC: 3.6 MMOL/L — SIGNIFICANT CHANGE UP (ref 3.5–5.3)
POTASSIUM SERPL-SCNC: 3.6 MMOL/L — SIGNIFICANT CHANGE UP (ref 3.5–5.3)
PROT SERPL-MCNC: 6 G/DL — SIGNIFICANT CHANGE UP (ref 6–8.3)
RBC # BLD: 4.22 M/UL — SIGNIFICANT CHANGE UP (ref 4.2–5.8)
RBC # FLD: 12.5 % — SIGNIFICANT CHANGE UP (ref 10.3–14.5)
SAO2 % BLDV: 94.4 % — HIGH (ref 67–88)
SODIUM SERPL-SCNC: 142 MMOL/L — SIGNIFICANT CHANGE UP (ref 135–145)
SPECIMEN SOURCE: SIGNIFICANT CHANGE UP
WBC # BLD: 6.38 K/UL — SIGNIFICANT CHANGE UP (ref 3.8–10.5)
WBC # FLD AUTO: 6.38 K/UL — SIGNIFICANT CHANGE UP (ref 3.8–10.5)

## 2023-03-07 PROCEDURE — 99233 SBSQ HOSP IP/OBS HIGH 50: CPT

## 2023-03-07 PROCEDURE — 73718 MRI LOWER EXTREMITY W/O DYE: CPT | Mod: 26,LT

## 2023-03-07 PROCEDURE — 71045 X-RAY EXAM CHEST 1 VIEW: CPT | Mod: 26

## 2023-03-07 RX ORDER — PREGABALIN 225 MG/1
1000 CAPSULE ORAL DAILY
Refills: 0 | Status: DISCONTINUED | OUTPATIENT
Start: 2023-03-07 | End: 2023-03-22

## 2023-03-07 RX ORDER — ASPIRIN/CALCIUM CARB/MAGNESIUM 324 MG
81 TABLET ORAL DAILY
Refills: 0 | Status: DISCONTINUED | OUTPATIENT
Start: 2023-03-07 | End: 2023-03-09

## 2023-03-07 RX ORDER — BUDESONIDE AND FORMOTEROL FUMARATE DIHYDRATE 160; 4.5 UG/1; UG/1
2 AEROSOL RESPIRATORY (INHALATION)
Refills: 0 | Status: DISCONTINUED | OUTPATIENT
Start: 2023-03-07 | End: 2023-03-22

## 2023-03-07 RX ORDER — TIOTROPIUM BROMIDE 18 UG/1
2 CAPSULE ORAL; RESPIRATORY (INHALATION) DAILY
Refills: 0 | Status: DISCONTINUED | OUTPATIENT
Start: 2023-03-07 | End: 2023-03-22

## 2023-03-07 RX ORDER — IPRATROPIUM/ALBUTEROL SULFATE 18-103MCG
3 AEROSOL WITH ADAPTER (GRAM) INHALATION EVERY 6 HOURS
Refills: 0 | Status: DISCONTINUED | OUTPATIENT
Start: 2023-03-07 | End: 2023-03-22

## 2023-03-07 RX ORDER — CHOLECALCIFEROL (VITAMIN D3) 125 MCG
400 CAPSULE ORAL DAILY
Refills: 0 | Status: DISCONTINUED | OUTPATIENT
Start: 2023-03-07 | End: 2023-03-22

## 2023-03-07 RX ORDER — METOPROLOL TARTRATE 50 MG
50 TABLET ORAL
Refills: 0 | Status: DISCONTINUED | OUTPATIENT
Start: 2023-03-07 | End: 2023-03-22

## 2023-03-07 RX ORDER — INSULIN LISPRO 100/ML
VIAL (ML) SUBCUTANEOUS AT BEDTIME
Refills: 0 | Status: DISCONTINUED | OUTPATIENT
Start: 2023-03-07 | End: 2023-03-17

## 2023-03-07 RX ORDER — ASCORBIC ACID 60 MG
500 TABLET,CHEWABLE ORAL DAILY
Refills: 0 | Status: DISCONTINUED | OUTPATIENT
Start: 2023-03-07 | End: 2023-03-22

## 2023-03-07 RX ADMIN — TAMSULOSIN HYDROCHLORIDE 0.4 MILLIGRAM(S): 0.4 CAPSULE ORAL at 21:59

## 2023-03-07 RX ADMIN — Medication 50 MILLIGRAM(S): at 17:22

## 2023-03-07 RX ADMIN — CEFEPIME 100 MILLIGRAM(S): 1 INJECTION, POWDER, FOR SOLUTION INTRAMUSCULAR; INTRAVENOUS at 18:14

## 2023-03-07 RX ADMIN — Medication 6: at 12:03

## 2023-03-07 RX ADMIN — Medication 3 MILLILITER(S): at 13:31

## 2023-03-07 RX ADMIN — ATORVASTATIN CALCIUM 20 MILLIGRAM(S): 80 TABLET, FILM COATED ORAL at 21:59

## 2023-03-07 RX ADMIN — BUDESONIDE AND FORMOTEROL FUMARATE DIHYDRATE 2 PUFF(S): 160; 4.5 AEROSOL RESPIRATORY (INHALATION) at 22:03

## 2023-03-07 RX ADMIN — Medication 3 MILLILITER(S): at 01:14

## 2023-03-07 RX ADMIN — Medication 3 MILLILITER(S): at 07:33

## 2023-03-07 RX ADMIN — Medication 12.5 MILLIGRAM(S): at 06:49

## 2023-03-07 RX ADMIN — Medication 8: at 17:11

## 2023-03-07 RX ADMIN — INSULIN GLARGINE 15 UNIT(S): 100 INJECTION, SOLUTION SUBCUTANEOUS at 22:12

## 2023-03-07 RX ADMIN — APIXABAN 2.5 MILLIGRAM(S): 2.5 TABLET, FILM COATED ORAL at 17:11

## 2023-03-07 RX ADMIN — CEFEPIME 100 MILLIGRAM(S): 1 INJECTION, POWDER, FOR SOLUTION INTRAMUSCULAR; INTRAVENOUS at 06:50

## 2023-03-07 RX ADMIN — Medication 20 MILLIGRAM(S): at 06:50

## 2023-03-07 RX ADMIN — APIXABAN 2.5 MILLIGRAM(S): 2.5 TABLET, FILM COATED ORAL at 06:50

## 2023-03-07 RX ADMIN — MONTELUKAST 10 MILLIGRAM(S): 4 TABLET, CHEWABLE ORAL at 12:57

## 2023-03-07 NOTE — PROGRESS NOTE ADULT - SUBJECTIVE AND OBJECTIVE BOX
Patient is a 82y old  Male who presents with a chief complaint of Left  thigh draining sinus (07 Mar 2023 12:32)      Subjective:  INTERVAL HPI/OVERNIGHT EVENTS: Patient seen and examined at bedside.  Patient states he removed bipap last night, noticed SOB after breakfast this morning-now back to normal breathing pattern and talk /eating lunch without difficulty   MEDICATIONS  (STANDING):  albuterol/ipratropium for Nebulization 3 milliLiter(s) Nebulizer every 6 hours  apixaban 2.5 milliGRAM(s) Oral every 12 hours  ascorbic acid 500 milliGRAM(s) Oral daily  aspirin enteric coated 81 milliGRAM(s) Oral daily  atorvastatin 20 milliGRAM(s) Oral at bedtime  budesonide 160 MICROgram(s)/formoterol 4.5 MICROgram(s) Inhaler 2 Puff(s) Inhalation two times a day  cefepime   IVPB      cefepime   IVPB 2000 milliGRAM(s) IV Intermittent every 12 hours  cholecalciferol 400 Unit(s) Oral daily  cyanocobalamin 1000 MICROGram(s) Oral daily  dextrose 5%. 1000 milliLiter(s) (50 mL/Hr) IV Continuous <Continuous>  dextrose 5%. 1000 milliLiter(s) (100 mL/Hr) IV Continuous <Continuous>  dextrose 50% Injectable 25 Gram(s) IV Push once  dextrose 50% Injectable 12.5 Gram(s) IV Push once  dextrose 50% Injectable 25 Gram(s) IV Push once  glucagon  Injectable 1 milliGRAM(s) IntraMuscular once  insulin glargine Injectable (LANTUS) 15 Unit(s) SubCutaneous at bedtime  insulin lispro (ADMELOG) corrective regimen sliding scale   SubCutaneous three times a day before meals  metoprolol tartrate 50 milliGRAM(s) Oral two times a day  montelukast 10 milliGRAM(s) Oral daily  multivitamin 1 Tablet(s) Oral daily  Nephro-carlos 1 Tablet(s) Oral daily  predniSONE   Tablet 20 milliGRAM(s) Oral daily  tamsulosin 0.4 milliGRAM(s) Oral at bedtime  tiotropium 2.5 MICROgram(s) Inhaler 2 Puff(s) Inhalation daily    MEDICATIONS  (PRN):  dextrose Oral Gel 15 Gram(s) Oral once PRN Blood Glucose LESS THAN 70 milliGRAM(s)/deciliter      Allergies    [This allergen will not trigger allergy alert] IV DYE, IODINE CONTRAST (Unknown)  [This allergen will not trigger allergy alert] NKDA (Unknown)  latex (Unknown)    Intolerances    shellfish (Nausea)      REVIEW OF SYSTEMS:  CONSTITUTIONAL: No fever or chills  HEENT:  No headache, no sore throat  RESPIRATORY: + shortness of breath  CARDIOVASCULAR: No chest pain or palpitations  GASTROINTESTINAL: No abd pain, nausea, vomiting, or diarrhea      Objective:  Vital Signs Last 24 Hrs  T(C): 37 (07 Mar 2023 09:09), Max: 37.1 (06 Mar 2023 23:12)  T(F): 98.6 (07 Mar 2023 09:09), Max: 98.7 (06 Mar 2023 23:12)  HR: 101 (07 Mar 2023 10:57) (77 - 110)  BP: 168/73 (07 Mar 2023 09:09) (96/63 - 168/73)  BP(mean): --  RR: 20 (07 Mar 2023 09:09) (18 - 20)  SpO2: 100% (07 Mar 2023 10:57) (75% - 100%)    Parameters below as of 07 Mar 2023 10:57  Patient On (Oxygen Delivery Method): nasal cannula  O2 Flow (L/min): 5      GENERAL: NAD, lying in bed comfortably  HEAD:  Normocephalic  EYES:  conjunctiva and sclera clear  ENT: Moist mucous membranes  NECK: Supple  CHEST/LUNG: Clear to auscultation bilaterally; No rales or rhonchi; no wheezing. Unlabored respirations  HEART: Regular rate and rhythm; S1S2+  ABDOMEN: Bowel sounds present; Soft, Nontender, Nondistended.   EXTREMITIES:  + distal Peripheral Pulses;  No cyanosis, or edema  NERVOUS SYSTEM:  Alert & Oriented X3;  No gross focal deficits   MSK: moves all extremities  SKIN: No rashes     LABS:                        11.8   6.38  )-----------( 340      ( 07 Mar 2023 06:55 )             37.8     07 Mar 2023 06:55    142    |  108    |  16     ----------------------------<  121    3.6     |  30     |  1.30     Ca    9.1        07 Mar 2023 06:55    TPro  6.0    /  Alb  2.7    /  TBili  0.3    /  DBili  x      /  AST  10     /  ALT  13     /  AlkPhos  78     07 Mar 2023 06:55    PT/INR - ( 06 Mar 2023 15:53 )   PT: 12.6 sec;   INR: 1.08 ratio         PTT - ( 06 Mar 2023 15:53 )  PTT:39.1 sec  Urinalysis Basic - ( 06 Mar 2023 20:15 )    Color: Yellow / Appearance: Clear / S.015 / pH: x  Gluc: x / Ketone: Negative  / Bili: Negative / Urobili: Negative   Blood: x / Protein: Negative / Nitrite: Negative   Leuk Esterase: Negative / RBC: x / WBC x   Sq Epi: x / Non Sq Epi: x / Bacteria: x      CAPILLARY BLOOD GLUCOSE      POCT Blood Glucose.: 274 mg/dL (07 Mar 2023 11:45)  POCT Blood Glucose.: 149 mg/dL (07 Mar 2023 08:06)  POCT Blood Glucose.: 193 mg/dL (06 Mar 2023 22:03)  POCT Blood Glucose.: 258 mg/dL (06 Mar 2023 20:34)          RADIOLOGY & ADDITIONAL TESTS:  CXR   Personally reviewed.     Consultant(s) Notes Reviewed:  [x] YES  [ ] NO    Plan of care discussed with patient /family: wife at bedside ; all questions answered   Patient is a 82y old  Male who presents with a chief complaint of Left  thigh draining sinus (07 Mar 2023 12:32)      Subjective:  INTERVAL HPI/OVERNIGHT EVENTS: Patient seen and examined at bedside.  Patient states he removed bipap last night, noticed SOB after breakfast this morning-now back to normal breathing pattern and talk /eating lunch without difficulty   MEDICATIONS  (STANDING):  albuterol/ipratropium for Nebulization 3 milliLiter(s) Nebulizer every 6 hours  apixaban 2.5 milliGRAM(s) Oral every 12 hours  ascorbic acid 500 milliGRAM(s) Oral daily  aspirin enteric coated 81 milliGRAM(s) Oral daily  atorvastatin 20 milliGRAM(s) Oral at bedtime  budesonide 160 MICROgram(s)/formoterol 4.5 MICROgram(s) Inhaler 2 Puff(s) Inhalation two times a day  cefepime   IVPB      cefepime   IVPB 2000 milliGRAM(s) IV Intermittent every 12 hours  cholecalciferol 400 Unit(s) Oral daily  cyanocobalamin 1000 MICROGram(s) Oral daily  dextrose 5%. 1000 milliLiter(s) (50 mL/Hr) IV Continuous <Continuous>  dextrose 5%. 1000 milliLiter(s) (100 mL/Hr) IV Continuous <Continuous>  dextrose 50% Injectable 25 Gram(s) IV Push once  dextrose 50% Injectable 12.5 Gram(s) IV Push once  dextrose 50% Injectable 25 Gram(s) IV Push once  glucagon  Injectable 1 milliGRAM(s) IntraMuscular once  insulin glargine Injectable (LANTUS) 15 Unit(s) SubCutaneous at bedtime  insulin lispro (ADMELOG) corrective regimen sliding scale   SubCutaneous three times a day before meals  metoprolol tartrate 50 milliGRAM(s) Oral two times a day  montelukast 10 milliGRAM(s) Oral daily  multivitamin 1 Tablet(s) Oral daily  Nephro-carlos 1 Tablet(s) Oral daily  predniSONE   Tablet 20 milliGRAM(s) Oral daily  tamsulosin 0.4 milliGRAM(s) Oral at bedtime  tiotropium 2.5 MICROgram(s) Inhaler 2 Puff(s) Inhalation daily    MEDICATIONS  (PRN):  dextrose Oral Gel 15 Gram(s) Oral once PRN Blood Glucose LESS THAN 70 milliGRAM(s)/deciliter      Allergies    [This allergen will not trigger allergy alert] IV DYE, IODINE CONTRAST (Unknown)  [This allergen will not trigger allergy alert] NKDA (Unknown)  latex (Unknown)    Intolerances    shellfish (Nausea)      REVIEW OF SYSTEMS:  CONSTITUTIONAL: No fever or chills  HEENT:  No headache, no sore throat  RESPIRATORY: + shortness of breath  CARDIOVASCULAR: No chest pain or palpitations  GASTROINTESTINAL: No abd pain, nausea, vomiting, or diarrhea      Objective:  Vital Signs Last 24 Hrs  T(C): 37 (07 Mar 2023 09:09), Max: 37.1 (06 Mar 2023 23:12)  T(F): 98.6 (07 Mar 2023 09:09), Max: 98.7 (06 Mar 2023 23:12)  HR: 101 (07 Mar 2023 10:57) (77 - 110)  BP: 168/73 (07 Mar 2023 09:09) (96/63 - 168/73)  BP(mean): --  RR: 20 (07 Mar 2023 09:09) (18 - 20)  SpO2: 100% (07 Mar 2023 10:57) (75% - 100%)    Parameters below as of 07 Mar 2023 10:57  Patient On (Oxygen Delivery Method): nasal cannula  O2 Flow (L/min): 5      GENERAL: NAD, lying in bed comfortably  HEAD:  Normocephalic  EYES:  conjunctiva and sclera clear  ENT: Moist mucous membranes  NECK: Supple  CHEST/LUNG: Clear to auscultation bilaterally; No rales or rhonchi; no wheezing. Unlabored respirations  HEART: Regular rate and rhythm; S1S2+  ABDOMEN: Bowel sounds present; Soft, Nontender, Nondistended.   EXTREMITIES:  + distal Peripheral Pulses;  No cyanosis, or edema  NERVOUS SYSTEM:  Alert & Oriented X3;  No gross focal deficits   MSK: moves all extremities  SKIN: No rashes     LABS:                        11.8   6.38  )-----------( 340      ( 07 Mar 2023 06:55 )             37.8     07 Mar 2023 06:55    142    |  108    |  16     ----------------------------<  121    3.6     |  30     |  1.30     Ca    9.1        07 Mar 2023 06:55    TPro  6.0    /  Alb  2.7    /  TBili  0.3    /  DBili  x      /  AST  10     /  ALT  13     /  AlkPhos  78     07 Mar 2023 06:55    PT/INR - ( 06 Mar 2023 15:53 )   PT: 12.6 sec;   INR: 1.08 ratio         PTT - ( 06 Mar 2023 15:53 )  PTT:39.1 sec  Urinalysis Basic - ( 06 Mar 2023 20:15 )    Color: Yellow / Appearance: Clear / S.015 / pH: x  Gluc: x / Ketone: Negative  / Bili: Negative / Urobili: Negative   Blood: x / Protein: Negative / Nitrite: Negative   Leuk Esterase: Negative / RBC: x / WBC x   Sq Epi: x / Non Sq Epi: x / Bacteria: x      CAPILLARY BLOOD GLUCOSE      POCT Blood Glucose.: 274 mg/dL (07 Mar 2023 11:45)  POCT Blood Glucose.: 149 mg/dL (07 Mar 2023 08:06)  POCT Blood Glucose.: 193 mg/dL (06 Mar 2023 22:03)  POCT Blood Glucose.: 258 mg/dL (06 Mar 2023 20:34)          RADIOLOGY & ADDITIONAL TESTS:  CXR   Personally reviewed.     Consultant(s) Notes Reviewed:  [x] YES  [ ] NO    Plan of care discussed with patient /family: wife at bedside ; all questions answered, meds list reviewed with wife

## 2023-03-07 NOTE — PROGRESS NOTE ADULT - PROBLEM SELECTOR PLAN 1
Patient said he has had two  saucerization procedure in the past   Had abscess and drainage in November 2022  Had been variously treated with antibiotics   ID consulted  - Cefepime 2 g iv q 12. MRI for further evaluation   Wound care

## 2023-03-07 NOTE — PROGRESS NOTE ADULT - PROBLEM SELECTOR PLAN 4
Plavix 75 mg po daily   Atorvastatin 20 mg po daily c/w ASA AS Home dose   Atorvastatin 20 mg po daily

## 2023-03-07 NOTE — CARE COORDINATION ASSESSMENT. - OTHER PERTINENT REFERRAL INFORMATION
Met with the patient at the bedside. Explained the role of case management/discharge planning. Patient verbalized understanding. Provided contact information and discharge packet. Patient resides with his spouse and was independent PTA. Patient admitted with LLE cellulitis with abscess. Discharge needs pending continued hospital stay.  Will remain available to patient and spouse throughout hospital stay

## 2023-03-07 NOTE — CONSULT NOTE ADULT - ASSESSMENT
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     OM  COURTNEY  Asthma  COPD  HTN  CAD  CABG hx    NIV use night time and prn - 18/10 and 25 pct fio2  sleep hygiene reviewed  [facility] rx regimen  BP control  pt is on Nucala in the office - Monthly with Dr Oscar Marcelino - [facility]  on Symbicort - Spiriva - Singulair - copd - asthma -   NEBS PRN  monitor VS and Sat  keep sat > 88 pct  spoke with pt - wife  outpatient records reviewed  emp ABX in progress - ID follow up  skin - wound care

## 2023-03-07 NOTE — CARE COORDINATION ASSESSMENT. - NSPASTMEDSURGHISTORY_GEN_ALL_CORE_FT
Mom called, she said she needs his omnipod pump sent in. They got his CGM yesterday, but have to have both to send them out. PAST MEDICAL & SURGICAL HISTORY:  Dyslipidemia      CAD (Coronary Artery Disease)  s/p 3v CABG 2004; stents placed in [place] in 2019      Diabetes Mellitus, Type II      CABG (Coronary Artery Bypass Graft)  2004      Osteomyelitis      Hypertension      COPD (chronic obstructive pulmonary disease)  on 2L at home and BiPAP at night; intubated 6/18      PVD (peripheral vascular disease)      Compound fracture  left leg      S/P primary angioplasty with coronary stent      History of PAT (paroxysmal atrial tachycardia)      BPH (benign prostatic hyperplasia)      Asthma with COPD      H/O drainage of abscess  Left femur 12/2021      Acute osteomyelitis

## 2023-03-07 NOTE — CARE COORDINATION ASSESSMENT. - NSCAREPROVIDERS_GEN_ALL_CORE_FT
CARE PROVIDERS:  Accepting Physician: Jerzy Cisse  Administration: Miguel Wing  Administration: Emmett Langford  Admitting: Jerzy Cisse  Attending: Jerzy Cisse  Case Management: Jessica Zurita  Clinical Doc. Improvement: Venessa Baez  Consultant: Brandi العلي  Covering Team: Betty Winter  Covering Team: Chichi Grossman  Covering Team: Austin Davenport  Covering Team: Naren Zavala  ED ACP: Ramiro Harris  ED Attending: Bessie Genao  ED Nurse: Mary Alice Estevez  Nurse: Ita Curry  Nurse: Kelle Vivas  Nurse: Ya Newman  Ordered: Doctor, Unknown  Outpatient Provider: Oscar Marcelino  Outpatient Provider: Pallavi Rod  Override: Ya Newman  Override: Kelle Vivsa  Override: Luis Napoles  Override: Jero Amador  PCA/Nursing Assistant: Luis Napoles  Primary Team: Viola Aaron  Primary Team: Asha Rai  Primary Team: Ancelmo Sky  Registered Dietitian: Luci Rushing  Respiratory Therapy: Boubacar Acosta  Respiratory Therapy: Kenan Fernández  : Zoë Barker  : Una Guillen   CARE PROVIDERS:  Accepting Physician: Jerzy Cisse  Administration: Miguel Wing  Administration: Carmine Strong  Administration: Emmett Langford  Admitting: Jerzy Cisse  Attending: Ancelmo Sky  Case Management: Jessica Zurita  Clinical Doc. Improvement: Venessa Baez  Consultant: Brandi العلي  Covering Team: Betty Winter  Covering Team: Naren Zavala  Covering Team: Chichi Grossman  Covering Team: Austin Davenport  ED ACP: Ramiro Harris  ED Attending: Bessie Genao ED Nurse: Mary Alice Estevez  Nurse: Ita Curry  Ordered: Doctor, Unknown  Outpatient Provider: Oscar Marcelino  Outpatient Provider: Pallavi Rod  Override: Kelle Vivas  Override: Ya Newman  Override: Luis Napoles  Override: Jero Amador  PCA/Nursing Assistant: Dav Harris  Primary Team: Ancelmo Sky  Primary Team: Viola Aaron  Primary Team: Asha Rai  Registered Dietitian: Luci Rushing  Respiratory Therapy: Jose Han  Respiratory Therapy: Boubacar Acosta  Respiratory Therapy: Wendy Riojas  Respiratory Therapy: Kenan Fernández  : Zoë Barker  : Una Guillen

## 2023-03-07 NOTE — PATIENT PROFILE ADULT - FALL HARM RISK - HARM RISK INTERVENTIONS

## 2023-03-07 NOTE — PROGRESS NOTE ADULT - SUBJECTIVE AND OBJECTIVE BOX
[facility]  INFECTIOUS DISEASES   [address] [place] [address]  Tel: [phone]     Fax: [phone]  ========================================================  MD Noman Perry Kaushal, MD Cho, Michelle, MD Sunjit, Jaspal, MD  ========================================================    [id number]  [facility]     Follow up: left thigh abscess and OM    Has more discharge from left thigh. More pain, no fever.   Had problem with bipap setting last night as per wife, now feels a little SOB and tired.     PAST MEDICAL & SURGICAL HISTORY:  Diabetes Mellitus, Type II  CAD (Coronary Artery Disease)  s/p 3v CABG ; stents [place] in   Dyslipidemia  Osteomyelitis  COPD (chronic obstructive pulmonary disease)  on 2L at home and BiPAP at night; intubated   Hypertension  PVD (peripheral vascular disease)  History of PAT (paroxysmal atrial tachycardia)  Asthma with COPD  BPH (benign prostatic hyperplasia)  Acute osteomyelitis  CABG (Coronary Artery Bypass Graft)    Compound fracture  left leg  S/P primary angioplasty with coronary stent  H/O drainage of abscess  Left femur 2021    Social Hx: No current smoking, ETOH or drugs     FAMILY HISTORY:  Family history of diabetes mellitus (Sibling)    Family hx of lung cancer  brother,  age 82, used to smoke with pt    Allergies  No Known Allergies    Intolerances  shellfish (Nausea)    Antibiotics:  Ciprofloxacin      REVIEW OF SYSTEMS:  CONSTITUTIONAL:  No Fever or chills  HEENT:  No diplopia or blurred vision.  No sore throat or runny nose.  CARDIOVASCULAR:  No chest pain or SOB.  RESPIRATORY:  No cough, shortness of breath, PND or orthopnea.  GASTROINTESTINAL:  No nausea, vomiting or diarrhea.  GENITOURINARY:  No dysuria, frequency or urgency. No Blood in urine  MUSCULOSKELETAL:  left thigh pain and drainage   SKIN:  No change in skin, hair or nails.  NEUROLOGIC:  No paresthesias or weakness.  PSYCHIATRIC:  No disorder of thought or mood.  ENDOCRINE:  No heat or cold intolerance, polyuria or polydipsia.  HEMATOLOGICAL:  No easy bruising or bleeding.     Physical Exam:  Vital Signs Last 24 Hrs  T(C): 37 (07 Mar 2023 09:09), Max: 37.1 (06 Mar 2023 23:12)  T(F): 98.6 (07 Mar 2023 09:09), Max: 98.7 (06 Mar 2023 23:12)  HR: 101 (07 Mar 2023 10:57) (77 - 110)  BP: 168/73 (07 Mar 2023 09:09) (96/63 - 168/73)  BP(mean): --  RR: 20 (07 Mar 2023 09:09) (18 - 20)  SpO2: 100% (07 Mar 2023 10:57) (75% - 100%)  Parameters below as of 07 Mar 2023 10:57  Patient On (Oxygen Delivery Method): nasal cannula  O2 Flow (L/min): 5  GEN: NAD  HEENT: normocephalic and atraumatic. EOMI. PERRL.    NECK: Supple.  No lymphadenopathy   LUNGS: poor air movement   HEART: Regular rate and rhythm   ABDOMEN: Soft, nontender, and nondistended.  Positive bowel sounds.    : No CVA tenderness  EXTREMITIES: left thigh with scar in lateral side with fluctuation and small opening with yellow discharge   NEUROLOGIC: grossly intact.  PSYCHIATRIC: Appropriate affect .  SKIN: No rash     Labs:                        11.8   6.38  )-----------( 340      ( 07 Mar 2023 06:55 )             37.8         142  |  108  |  16  ----------------------------<  121<H>  3.6   |  30  |  1.30    Ca    9.1      07 Mar 2023 06:55    TPro  6.0  /  Alb  2.7<L>  /  TBili  0.3  /  DBili  x   /  AST  10<L>  /  ALT  13  /  AlkPhos  78      WBC Count: 6.38 K/uL (23 @ 06:55)  WBC Count: 7.17 K/uL (23 @ 22:00)  WBC Count: 7.06 K/uL (23 @ 15:53)    Creatinine, Serum: 1.30 mg/dL (23 @ 06:55)  Creatinine, Serum: 1.60 mg/dL (23 @ 22:00)  Creatinine, Serum: 1.50 mg/dL (23 @ 15:53)    C-Reactive Protein, Serum: 45 mg/L (23 @ 15:53)    Sedimentation Rate, Erythrocyte: 39 mm/hr (23 @ 15:53)     SARS-CoV-2 Result: NotDetec (23 @ 15:53)    All imaging and other data have been reviewed.  < from: MR Femur No Cont, Left (22 @ 13:40) >  IMPRESSION:  Chronic osteomyelitis with deformity of bone and with scattered areas of   T2 hyperintensity, less prominent than on the previous MRI of 2021,   however cannot exclude superimposed acute osteomyelitis/intraosseous   abscess.  Fluid tract extending from the chronic bony defect is contiguous with a   soft tissue abscess centered deep to the vastus intermedius measuring  12.9 cm craniocaudally, with surrounding surrounding muscle and soft   tissue edema.    Assessment and Plan:   81yo man with PMH of HTN, COPD on home O2, CAD s/p CABG, PVD s/p stents in b/l legs and left femoral fracture more than 50 years ago, was admitted at [facility] in 2021 with left  leg pain on the site of old fracture after CT showed possible intramedullary abscess. He had pain and infection in the old fracture area at least 3-4 times in the past, had multiple surgeries   and drainage of area with a long course of antibiotic treatment.  MRI showed collection, intraosseous abscess  S/P IR drain placement on 21  IR culture NGTD but had another culture with enterobacter.   Completed 6 weeks of ceftriaxone 2gm with PICC line in 2022  He was seen in the clinic and was started on suppressive ciprofloxacin for good oral bioavailability and also based on the culture.  MRI 2022 done showed 12.9cm collection with OM.   He stopped cipro sometimes last year and now feels more pain and swelling in area and started draining again.   No fever or chills at this time. Has restarted cipro few weeks ago again.     Recommendations:  - Continue on cefepime 2gm q12  - Possible repeat MRI and IR consult for possible drain placement   - Wound discharge sent for culture     Will follow.  Discussed with the primary team.     Brandi العلي MD  Division of Infectious Diseases   Please call ID service at [phone] with any question.      35 minutes spent on total encounter assessing patient, examination, chart review, counseling and coordinating care by the attending physician/nurse/care manager.

## 2023-03-07 NOTE — PROGRESS NOTE ADULT - PROBLEM SELECTOR PLAN 5
Inhaled broncho dilators   Singulair 10 mg po hs  Prednisone 20 mg po daily Inhaled broncho dilators , on home incruse and breo-formulary interchange   Singulair 10 mg po hs  not on home Prednisone, will d/c  on BIPAP at night   Xray: < from: Xray Chest 1 View- PORTABLE-Urgent (Xray Chest 1 View- PORTABLE-Urgent .) (03.07.23 @ 09:59) >    IMPRESSION: COPD left base scarring again noted.      pulmonary eval called

## 2023-03-07 NOTE — CONSULT NOTE ADULT - SUBJECTIVE AND OBJECTIVE BOX
Date/Time Patient Seen:  		  Referring MD:   Data Reviewed	       Patient is a 82y old  Male who presents with a chief complaint of Left  thigh draining sinus (07 Mar 2023 12:49)      Subjective/HPI  in bed  seen and examined  vs noted  labs reviewed  on NIV  old records reviewed  spoke with WIFE  known to me from prior admissions  MRI noted of LE      History of Present Illness:  Reason for Admission: Left  thigh draining sinus  History of Present Illness:   82 yr male with history of Hypertension, COPD home oxygen dependent, CAD CABG , chronic left femor osteomyelitis since traumatic left femoral fracture in .   Patient follow with ID dr العلي for mgt of his chronic femoral osteomyelitis . Has abscess and drainage in 2022. Present with one day of new yellowish  drainage from left thigh. No fever or chills or other constitutional symptoms.   PAST MEDICAL & SURGICAL HISTORY:  Diabetes Mellitus, Type II    CAD (Coronary Artery Disease)  s/p 3v CABG ; stents placed in [place] in     Dyslipidemia    Asymptomatic Peripheral Vascular Disease    Osteomyelitis    COPD (chronic obstructive pulmonary disease)  on 2L at home and BiPAP at night; intubated     Diabetes mellitus    Hypertension    PVD (peripheral vascular disease)    History of PAT (paroxysmal atrial tachycardia)    Asthma with COPD    BPH (benign prostatic hyperplasia)    Acute osteomyelitis    CABG (Coronary Artery Bypass Graft)      Compound fracture  left leg    S/P primary angioplasty with coronary stent    H/O drainage of abscess  Left femur 2021    FAMILY HISTORY:  Family hx of lung cancer, brother,  age 82, used to smoke with pt    Sibling  Still living? Unknown  Family history of diabetes mellitus, Age at diagnosis: Age Unknown.     Social History:  · Substance use	No  · Social History (marital status, living situation, occupation, and sexual history)	 and living with spouse     Tobacco Screening:  · Core Measure Site	Yes  · Has the patient used tobacco in the past 30 days?	No        Medication list         MEDICATIONS  (STANDING):  albuterol/ipratropium for Nebulization 3 milliLiter(s) Nebulizer every 6 hours  apixaban 2.5 milliGRAM(s) Oral every 12 hours  ascorbic acid 500 milliGRAM(s) Oral daily  aspirin enteric coated 81 milliGRAM(s) Oral daily  atorvastatin 20 milliGRAM(s) Oral at bedtime  budesonide 160 MICROgram(s)/formoterol 4.5 MICROgram(s) Inhaler 2 Puff(s) Inhalation two times a day  cefepime   IVPB      cefepime   IVPB 2000 milliGRAM(s) IV Intermittent every 12 hours  cholecalciferol 400 Unit(s) Oral daily  cyanocobalamin 1000 MICROGram(s) Oral daily  dextrose 5%. 1000 milliLiter(s) (50 mL/Hr) IV Continuous <Continuous>  dextrose 5%. 1000 milliLiter(s) (100 mL/Hr) IV Continuous <Continuous>  dextrose 50% Injectable 25 Gram(s) IV Push once  dextrose 50% Injectable 12.5 Gram(s) IV Push once  dextrose 50% Injectable 25 Gram(s) IV Push once  glucagon  Injectable 1 milliGRAM(s) IntraMuscular once  insulin glargine Injectable (LANTUS) 15 Unit(s) SubCutaneous at bedtime  insulin lispro (ADMELOG) corrective regimen sliding scale   SubCutaneous three times a day before meals  metoprolol tartrate 50 milliGRAM(s) Oral two times a day  montelukast 10 milliGRAM(s) Oral daily  multivitamin 1 Tablet(s) Oral daily  Nephro-carlos 1 Tablet(s) Oral daily  tamsulosin 0.4 milliGRAM(s) Oral at bedtime  tiotropium 2.5 MICROgram(s) Inhaler 2 Puff(s) Inhalation daily    MEDICATIONS  (PRN):  dextrose Oral Gel 15 Gram(s) Oral once PRN Blood Glucose LESS THAN 70 milliGRAM(s)/deciliter         Vitals log        ICU Vital Signs Last 24 Hrs  T(C): 36.3 (07 Mar 2023 17:09), Max: 37.1 (06 Mar 2023 23:12)  T(F): 97.4 (07 Mar 2023 17:09), Max: 98.7 (06 Mar 2023 23:12)  HR: 95 (07 Mar 2023 17:09) (77 - 110)  BP: 143/81 (07 Mar 2023 17:09) (116/60 - 168/73)  BP(mean): --  ABP: --  ABP(mean): --  RR: 18 (07 Mar 2023 17:09) (18 - 20)  SpO2: 98% (07 Mar 2023 17:09) (75% - 100%)    O2 Parameters below as of 07 Mar 2023 17:09  Patient On (Oxygen Delivery Method): nasal cannula  O2 Flow (L/min): 5               Input and Output:  I&O's Detail    06 Mar 2023 07:01  -  07 Mar 2023 07:00  --------------------------------------------------------  IN:  Total IN: 0 mL    OUT:    Voided (mL): 650 mL  Total OUT: 650 mL    Total NET: -650 mL          Lab Data                        11.8   6.38  )-----------( 340      ( 07 Mar 2023 06:55 )             37.8     03-07    142  |  108  |  16  ----------------------------<  121<H>  3.6   |  30  |  1.30    Ca    9.1      07 Mar 2023 06:55    TPro  6.0  /  Alb  2.7<L>  /  TBili  0.3  /  DBili  x   /  AST  10<L>  /  ALT  13  /  AlkPhos  78  03-07            Review of Systems	  pain  on o2 support      Objective     Physical Examination    heart s1s2  lung dec BS  head nc  head at  verbal  alert  cn grossly int      Pertinent Lab findings & Imaging      Gonzáles:  NO   Adequate UO     I&O's Detail    06 Mar 2023 07:01  -  07 Mar 2023 07:00  --------------------------------------------------------  IN:  Total IN: 0 mL    OUT:    Voided (mL): 650 mL  Total OUT: 650 mL    Total NET: -650 mL               Discussed with:     Cultures:	        Radiology    ACC: [id number] EXAM:  MR FEMUR LT   ORDERED BY: CARLOS العلي     PROCEDURE DATE:  2023          INTERPRETATION:  Exam Type: MR FEMUR LEFT  Exam Date and Time: 3/7/2023 3:18 PM  Indication: Cellulitis of the left lower limb, reevaluate collection in   osteomyelitis  Comparison: Left femur MRI from 2022    TECHNIQUE:  Multisequence multiplanar MRI of the left thigh/femur in three planes   (axial, sagittal, and coronal) using a standard non-contrast protocol.    FINDINGS:    Again seen is a chronic posttraumatic deformity of the mid to distal   femur with areas of sclerosis and subchondral cystic changes.   Redemonstrated is sequestrum and involucrum within bone with areas of   scattered T2 hyperintensity. There is been interval increase in the   marrow edema and loss of the normal T1 fatty marrow at the region of   sequestrum consistent with acute on chronic osteomyelitis.    From the level of the bony defect there is a longitudinal/vertical fluid   collection which is contiguous with a soft tissue abscess which near   circumferentially surrounds the anterior femur deep to the vastus   intermedius which is increased in size from prior. This has increased in   size and now measures up to 7.4 cm TRV by 5.2 cm AP by 22 cm CC,   previously 6.7 cm TRV by 3.8 cm AP by 14.6 cm CC. Soft tissue and   muscular edema surrounding the abscess.    IMPRESSION:  Chronic osteomyelitis with deformity of the mid to distal femur with   increase in the osseous marrow edema and loss of normal T1 fatty marrow   adjacent to the defect concerning for acute on chronic osteomyelitis.   Increase in size in the soft tissue abscess centered deep to the vastus   intermedius measuring up to 22cm CC.    --- End of Report ---            BRIAN ADAMS DO; Attending Radiologist  This document has been electronically signed. Mar  7 2023  3:59PM          ACC: [id number] EXAM:  XR CHEST PORTABLE URGENT 1V   ORDERED BY:  FREDI GARRISON     PROCEDURE DATE:  2023          INTERPRETATION:  AP chest on 2023 at 9:49 AM. 2 images. Patient   is short of breath.    Heart size normal. Sternotomy again noted.    COPD hyperexpansion of the lungs and some slight left base scarring again   noted.    Chest is similar to .    IMPRESSION: COPD left base scarring again noted.    --- End of Report ---            REJI ROTHMAN MD; Attending Radiologist  This document has been electronically signed. Mar  7 2023 11:34AM

## 2023-03-08 ENCOUNTER — NON-APPOINTMENT (OUTPATIENT)
Age: 83
End: 2023-03-08

## 2023-03-08 LAB
ANION GAP SERPL CALC-SCNC: 2 MMOL/L — LOW (ref 5–17)
BUN SERPL-MCNC: 19 MG/DL — SIGNIFICANT CHANGE UP (ref 7–23)
CALCIUM SERPL-MCNC: 9.2 MG/DL — SIGNIFICANT CHANGE UP (ref 8.5–10.1)
CHLORIDE SERPL-SCNC: 108 MMOL/L — SIGNIFICANT CHANGE UP (ref 96–108)
CO2 SERPL-SCNC: 33 MMOL/L — HIGH (ref 22–31)
CREAT SERPL-MCNC: 1.3 MG/DL — SIGNIFICANT CHANGE UP (ref 0.5–1.3)
EGFR: 55 ML/MIN/1.73M2 — LOW
GLUCOSE SERPL-MCNC: 122 MG/DL — HIGH (ref 70–99)
HCT VFR BLD CALC: 41.1 % — SIGNIFICANT CHANGE UP (ref 39–50)
HGB BLD-MCNC: 12.5 G/DL — LOW (ref 13–17)
MCHC RBC-ENTMCNC: 27.7 PG — SIGNIFICANT CHANGE UP (ref 27–34)
MCHC RBC-ENTMCNC: 30.4 GM/DL — LOW (ref 32–36)
MCV RBC AUTO: 91.1 FL — SIGNIFICANT CHANGE UP (ref 80–100)
NRBC # BLD: 0 /100 WBCS — SIGNIFICANT CHANGE UP (ref 0–0)
PLATELET # BLD AUTO: 366 K/UL — SIGNIFICANT CHANGE UP (ref 150–400)
POTASSIUM SERPL-MCNC: 4 MMOL/L — SIGNIFICANT CHANGE UP (ref 3.5–5.3)
POTASSIUM SERPL-SCNC: 4 MMOL/L — SIGNIFICANT CHANGE UP (ref 3.5–5.3)
RBC # BLD: 4.51 M/UL — SIGNIFICANT CHANGE UP (ref 4.2–5.8)
RBC # FLD: 12.5 % — SIGNIFICANT CHANGE UP (ref 10.3–14.5)
SODIUM SERPL-SCNC: 143 MMOL/L — SIGNIFICANT CHANGE UP (ref 135–145)
WBC # BLD: 6.62 K/UL — SIGNIFICANT CHANGE UP (ref 3.8–10.5)
WBC # FLD AUTO: 6.62 K/UL — SIGNIFICANT CHANGE UP (ref 3.8–10.5)

## 2023-03-08 PROCEDURE — 99232 SBSQ HOSP IP/OBS MODERATE 35: CPT

## 2023-03-08 PROCEDURE — 99233 SBSQ HOSP IP/OBS HIGH 50: CPT

## 2023-03-08 RX ADMIN — CEFEPIME 100 MILLIGRAM(S): 1 INJECTION, POWDER, FOR SOLUTION INTRAMUSCULAR; INTRAVENOUS at 18:16

## 2023-03-08 RX ADMIN — MONTELUKAST 10 MILLIGRAM(S): 4 TABLET, CHEWABLE ORAL at 11:07

## 2023-03-08 RX ADMIN — Medication 400 UNIT(S): at 11:08

## 2023-03-08 RX ADMIN — CEFEPIME 100 MILLIGRAM(S): 1 INJECTION, POWDER, FOR SOLUTION INTRAMUSCULAR; INTRAVENOUS at 05:53

## 2023-03-08 RX ADMIN — INSULIN GLARGINE 15 UNIT(S): 100 INJECTION, SOLUTION SUBCUTANEOUS at 21:57

## 2023-03-08 RX ADMIN — Medication 1 TABLET(S): at 11:08

## 2023-03-08 RX ADMIN — Medication 50 MILLIGRAM(S): at 17:04

## 2023-03-08 RX ADMIN — TIOTROPIUM BROMIDE 2 PUFF(S): 18 CAPSULE ORAL; RESPIRATORY (INHALATION) at 05:52

## 2023-03-08 RX ADMIN — Medication 500 MILLIGRAM(S): at 11:08

## 2023-03-08 RX ADMIN — BUDESONIDE AND FORMOTEROL FUMARATE DIHYDRATE 2 PUFF(S): 160; 4.5 AEROSOL RESPIRATORY (INHALATION) at 05:52

## 2023-03-08 RX ADMIN — APIXABAN 2.5 MILLIGRAM(S): 2.5 TABLET, FILM COATED ORAL at 05:53

## 2023-03-08 RX ADMIN — BUDESONIDE AND FORMOTEROL FUMARATE DIHYDRATE 2 PUFF(S): 160; 4.5 AEROSOL RESPIRATORY (INHALATION) at 21:58

## 2023-03-08 RX ADMIN — Medication 50 MILLIGRAM(S): at 05:54

## 2023-03-08 RX ADMIN — Medication 81 MILLIGRAM(S): at 11:07

## 2023-03-08 RX ADMIN — APIXABAN 2.5 MILLIGRAM(S): 2.5 TABLET, FILM COATED ORAL at 17:03

## 2023-03-08 RX ADMIN — ATORVASTATIN CALCIUM 20 MILLIGRAM(S): 80 TABLET, FILM COATED ORAL at 21:56

## 2023-03-08 RX ADMIN — PREGABALIN 1000 MICROGRAM(S): 225 CAPSULE ORAL at 11:08

## 2023-03-08 RX ADMIN — Medication 2: at 11:58

## 2023-03-08 RX ADMIN — TAMSULOSIN HYDROCHLORIDE 0.4 MILLIGRAM(S): 0.4 CAPSULE ORAL at 21:56

## 2023-03-08 RX ADMIN — Medication 2: at 17:02

## 2023-03-08 NOTE — PROGRESS NOTE ADULT - SUBJECTIVE AND OBJECTIVE BOX
Date/Time Patient Seen:  		  Referring MD:   Data Reviewed	       Patient is a 82y old  Male who presents with a chief complaint of Left  thigh draining sinus (07 Mar 2023 18:59)      Subjective/HPI     PAST MEDICAL & SURGICAL HISTORY:  Diabetes Mellitus, Type II    CAD (Coronary Artery Disease)  s/p 3v CABG 2004; stents placed in [place] in 2019    Dyslipidemia    Asymptomatic Peripheral Vascular Disease    Osteomyelitis    COPD (chronic obstructive pulmonary disease)  on 2L at home and BiPAP at night; intubated 6/18    Diabetes mellitus    Hypertension    PVD (peripheral vascular disease)    History of PAT (paroxysmal atrial tachycardia)    Asthma with COPD    BPH (benign prostatic hyperplasia)    Acute osteomyelitis    CABG (Coronary Artery Bypass Graft)  2004    Compound fracture  left leg    S/P primary angioplasty with coronary stent    H/O drainage of abscess  Left femur 12/2021          Medication list         MEDICATIONS  (STANDING):  apixaban 2.5 milliGRAM(s) Oral every 12 hours  ascorbic acid 500 milliGRAM(s) Oral daily  aspirin enteric coated 81 milliGRAM(s) Oral daily  atorvastatin 20 milliGRAM(s) Oral at bedtime  budesonide 160 MICROgram(s)/formoterol 4.5 MICROgram(s) Inhaler 2 Puff(s) Inhalation two times a day  cefepime   IVPB      cefepime   IVPB 2000 milliGRAM(s) IV Intermittent every 12 hours  cholecalciferol 400 Unit(s) Oral daily  cyanocobalamin 1000 MICROGram(s) Oral daily  dextrose 5%. 1000 milliLiter(s) (50 mL/Hr) IV Continuous <Continuous>  dextrose 5%. 1000 milliLiter(s) (100 mL/Hr) IV Continuous <Continuous>  dextrose 50% Injectable 25 Gram(s) IV Push once  dextrose 50% Injectable 12.5 Gram(s) IV Push once  dextrose 50% Injectable 25 Gram(s) IV Push once  glucagon  Injectable 1 milliGRAM(s) IntraMuscular once  insulin glargine Injectable (LANTUS) 15 Unit(s) SubCutaneous at bedtime  insulin lispro (ADMELOG) corrective regimen sliding scale   SubCutaneous three times a day before meals  insulin lispro (ADMELOG) corrective regimen sliding scale   SubCutaneous at bedtime  metoprolol tartrate 50 milliGRAM(s) Oral two times a day  montelukast 10 milliGRAM(s) Oral daily  multivitamin 1 Tablet(s) Oral daily  Nephro-carlos 1 Tablet(s) Oral daily  tamsulosin 0.4 milliGRAM(s) Oral at bedtime  tiotropium 2.5 MICROgram(s) Inhaler 2 Puff(s) Inhalation daily    MEDICATIONS  (PRN):  albuterol/ipratropium for Nebulization 3 milliLiter(s) Nebulizer every 6 hours PRN Shortness of Breath and/or Wheezing  dextrose Oral Gel 15 Gram(s) Oral once PRN Blood Glucose LESS THAN 70 milliGRAM(s)/deciliter         Vitals log        ICU Vital Signs Last 24 Hrs  T(C): 36.7 (07 Mar 2023 20:12), Max: 37 (07 Mar 2023 09:09)  T(F): 98 (07 Mar 2023 20:12), Max: 98.6 (07 Mar 2023 09:09)  HR: 72 (08 Mar 2023 00:03) (72 - 110)  BP: 100/64 (07 Mar 2023 20:12) (100/64 - 168/73)  BP(mean): --  ABP: --  ABP(mean): --  RR: 18 (07 Mar 2023 20:12) (18 - 20)  SpO2: 100% (08 Mar 2023 00:03) (75% - 100%)    O2 Parameters below as of 07 Mar 2023 20:12  Patient On (Oxygen Delivery Method): nasal cannula  O2 Flow (L/min): 5               Input and Output:  I&O's Detail    06 Mar 2023 07:01  -  07 Mar 2023 07:00  --------------------------------------------------------  IN:  Total IN: 0 mL    OUT:    Voided (mL): 650 mL  Total OUT: 650 mL    Total NET: -650 mL          Lab Data                        11.8   6.38  )-----------( 340      ( 07 Mar 2023 06:55 )             37.8     03-07    142  |  108  |  16  ----------------------------<  121<H>  3.6   |  30  |  1.30    Ca    9.1      07 Mar 2023 06:55    TPro  6.0  /  Alb  2.7<L>  /  TBili  0.3  /  DBili  x   /  AST  10<L>  /  ALT  13  /  AlkPhos  78  03-07            Review of Systems	      Objective     Physical Examination    heart s1s2  lung dc BS  head nc      Pertinent Lab findings & Imaging      Eliecer:  NO   Adequate UO     I&O's Detail    06 Mar 2023 07:01  -  07 Mar 2023 07:00  --------------------------------------------------------  IN:  Total IN: 0 mL    OUT:    Voided (mL): 650 mL  Total OUT: 650 mL    Total NET: -650 mL               Discussed with:     Cultures:	        Radiology

## 2023-03-08 NOTE — CONSULT NOTE ADULT - ASSESSMENT
81 yo male with long sanding DM , copd, and chronic osteo presented with left thigh abscess .        Chronic osteomyelitis with deformity of the mid to distal femur with   increase in the osseous marrow edema and loss of normal T1 fatty marrow   adjacent to the defect concerning for acute on chronic osteomyelitis.   Increase in size in the soft tissue abscess centered deep to the vastus   intermedius measuring up to 22cm CC.   Plan:  patient is well known to Dr العلي and agree with her recommendation of placement of IR guided drain and long term antibiotics.  patient may benefit from Ortho consult   no surgical intervention at this time.

## 2023-03-08 NOTE — CHART NOTE - NSCHARTNOTEFT_GEN_A_CORE
received well rested on bed cooperative, on yellow gown , meds given as ordere, w/r for any reaction. spoke with pmd pulm md  pt's home machine settings  18/8   spoke with RT  wife may bring in his home machine

## 2023-03-08 NOTE — PROGRESS NOTE ADULT - SUBJECTIVE AND OBJECTIVE BOX
CHIEF COMPLAINT/INTERVAL HISTORY:  Pt. seen and evaluated for left thigh wound and chronic osteomyelitis.  Pt. is in no distress.  Tolerating IV antibiotics.  Denies having any pain.      REVIEW OF SYSTEMS:  No fever, CP, SOB, or abdominal pain    Vital Signs Last 24 Hrs  T(C): 36.5 (08 Mar 2023 05:29), Max: 36.7 (07 Mar 2023 20:12)  T(F): 97.7 (08 Mar 2023 05:29), Max: 98 (07 Mar 2023 20:12)  HR: 86 (08 Mar 2023 05:29) (72 - 101)  BP: 126/75 (08 Mar 2023 05:29) (100/64 - 143/81)  BP(mean): --  RR: 18 (08 Mar 2023 05:29) (18 - 18)  SpO2: 99% (08 Mar 2023 08:07) (98% - 100%)    Parameters below as of 08 Mar 2023 08:07  Patient On (Oxygen Delivery Method): room air        PHYSICAL EXAM:  GENERAL: NAD  HEENT: EOMI, hearing normal, conjunctiva and sclera clear  Chest: CTA bilaterally, no wheezing  CV: S1S2, RRR,   GI: soft, +BS, NT/ND  Musculoskeletal: no edema, dressing over left lateral thigh  Psychiatric: affect nL, mood nL  Skin: warm and dry    LABS:                        12.5   6.62  )-----------( 366      ( 08 Mar 2023 06:02 )             41.1     03-08    143  |  108  |  19  ----------------------------<  122<H>  4.0   |  33<H>  |  1.30    Ca    9.2      08 Mar 2023 06:02    TPro  6.0  /  Alb  2.7<L>  /  TBili  0.3  /  DBili  x   /  AST  10<L>  /  ALT  13  /  AlkPhos  78  03-07    PT/INR - ( 06 Mar 2023 15:53 )   PT: 12.6 sec;   INR: 1.08 ratio         PTT - ( 06 Mar 2023 15:53 )  PTT:39.1 sec  Urinalysis Basic - ( 06 Mar 2023 20:15 )    Color: Yellow / Appearance: Clear / S.015 / pH: x  Gluc: x / Ketone: Negative  / Bili: Negative / Urobili: Negative   Blood: x / Protein: Negative / Nitrite: Negative   Leuk Esterase: Negative / RBC: x / WBC x   Sq Epi: x / Non Sq Epi: x / Bacteria: x

## 2023-03-08 NOTE — PROGRESS NOTE ADULT - ASSESSMENT
82 yr male with history of CAD previous CABG with 3 bypass on plavix , Hypertension, COPD  home oxygen dependent, long standing chronic left femoral osteomyelitis following traumatic fracture of  femur in 1966 , present with new drainage from left thigh chronic osteomyelitis .      Problem/Plan - 1:  ·  Problem: Chronic osteomyelitis.   ·  Plan:  Had abscess and drainage in November 2022  Had been variously treated with antibiotics   ID consulted  - Cefepime 2 g iv q 12.   Left femur MRI: Chronic osteomyelitis with deformity of the mid to distal femur with increase in the osseous marrow edema and loss of normal T1 fatty marrow adjacent to the defect concerning for acute on chronic osteomyelitis.   Increase in size in the soft tissue abscess centered deep to the vastus intermedius measuring up to 22cm CC.  Wound care.  Surgery consult.      Problem/Plan - 2:  ·  Problem: HTN (hypertension).   ·  Plan: Metoprolol 50 mg po bid as home dose.     Problem/Plan - 3:  ·  Problem: DM (diabetes mellitus), type 2.   ·  Plan: Insulin sliding scale , on home Jardiance   RISS, monitor FS.     Problem/Plan - 4:  ·  Problem: CAD (coronary artery disease).   ·  Plan: c/w ASA AS Home dose   Atorvastatin 20 mg po daily.     Problem/Plan - 5:  ·  Problem: COPD, moderate.   ·  Plan: Inhaled broncho dilators , on home incruse and breo-formulary interchange   Singulair 10 mg po hs  not on home Prednisone, will d/c  on BIPAP at night   Xray: < from: Xray Chest 1 View- PORTABLE-Urgent (Xray Chest 1 View- PORTABLE-Urgent .) (03.07.23 @ 09:59) >    IMPRESSION: COPD left base scarring again noted.  pulmonary f/u     Problem/Plan - 6:  ·  Problem: History of atrial paroxysmal tachycardia.   ·  Plan: Apixaban 2.5 mg po bid.     Problem/Plan - 7:  ·  Problem: BPH with obstruction/lower urinary tract symptoms.   ·  Plan: Tamsulosin 0.4 mg po daily.    VTE ppx:  On Eliquis

## 2023-03-08 NOTE — PROVIDER CONTACT NOTE (OTHER) - ASSESSMENT
pt on remote tele  monitor. per isaiah pt  and pulse ox 79%. pt noted in bathroom at this time. some SOB noted. oxygen applied NC at this time 5L. Rhythm strip in chart.

## 2023-03-08 NOTE — PROGRESS NOTE ADULT - ASSESSMENT
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     OM  COURTNEY  Asthma  COPD  HTN  CAD  CABG hx    02 use - night time NIV - BIPAP - uncomfortable with hospital machine - wife will bring in home device  vs noted    NIV use night time and prn - 18/10 and 25 pct fio2  sleep hygiene reviewed  cvs rx regimen  BP control  pt is on Nucala in the office - Monthly with Dr Oscar Marcelino - [facility]  on Symbicort - Spiriva - Singulair - copd - asthma -   NEBS PRN  monitor VS and Sat  keep sat > 88 pct  spoke with pt - wife  outpatient records reviewed  emp ABX in progress - ID follow up  skin - wound care

## 2023-03-08 NOTE — PROGRESS NOTE ADULT - SUBJECTIVE AND OBJECTIVE BOX
[facility]  INFECTIOUS DISEASES   [address] [place] [address]  Tel: [phone]     Fax: [phone]  ========================================================  MD Noman Perry Kaushal, MD Cho, Michelle, MD Sunjit, Jaspal, MD  ========================================================    [id number]  [facility]     Follow up: left thigh abscess and OM    +discharge from left thigh. More pain, no fever.     PAST MEDICAL & SURGICAL HISTORY:  Diabetes Mellitus, Type II  CAD (Coronary Artery Disease)  s/p 3v CABG ; stents [place] in   Dyslipidemia  Osteomyelitis  COPD (chronic obstructive pulmonary disease)  on 2L at home and BiPAP at night; intubated   Hypertension  PVD (peripheral vascular disease)  History of PAT (paroxysmal atrial tachycardia)  Asthma with COPD  BPH (benign prostatic hyperplasia)  Acute osteomyelitis  CABG (Coronary Artery Bypass Graft)    Compound fracture  left leg  S/P primary angioplasty with coronary stent  H/O drainage of abscess  Left femur 2021    Social Hx: No current smoking, ETOH or drugs     FAMILY HISTORY:  Family history of diabetes mellitus (Sibling)    Family hx of lung cancer  brother,  age 82, used to smoke with pt    Allergies  No Known Allergies    Intolerances  shellfish (Nausea)    Antibiotics:  Ciprofloxacin      REVIEW OF SYSTEMS:  CONSTITUTIONAL:  No Fever or chills  HEENT:  No diplopia or blurred vision.  No sore throat or runny nose.  CARDIOVASCULAR:  No chest pain or SOB.  RESPIRATORY:  No cough, shortness of breath, PND or orthopnea.  GASTROINTESTINAL:  No nausea, vomiting or diarrhea.  GENITOURINARY:  No dysuria, frequency or urgency. No Blood in urine  MUSCULOSKELETAL:  left thigh pain and drainage   SKIN:  No change in skin, hair or nails.  NEUROLOGIC:  No paresthesias or weakness.  PSYCHIATRIC:  No disorder of thought or mood.  ENDOCRINE:  No heat or cold intolerance, polyuria or polydipsia.  HEMATOLOGICAL:  No easy bruising or bleeding.     Physical Exam:  Vital Signs Last 24 Hrs  T(C): 36.4 (08 Mar 2023 12:48), Max: 36.7 (07 Mar 2023 20:12)  T(F): 97.6 (08 Mar 2023 12:48), Max: 98 (07 Mar 2023 20:12)  HR: 94 (08 Mar 2023 12:48) (72 - 95)  BP: 107/67 (08 Mar 2023 12:48) (100/64 - 143/81)  BP(mean): --  RR: 18 (08 Mar 2023 12:48) (18 - 18)  SpO2: 92% (08 Mar 2023 13:45) (83% - 100%)  Parameters below as of 08 Mar 2023 13:45  Patient On (Oxygen Delivery Method): room air,at rest  GEN: NAD  HEENT: normocephalic and atraumatic. EOMI. PERRL.    NECK: Supple.  No lymphadenopathy   LUNGS: poor air movement   HEART: Regular rate and rhythm   ABDOMEN: Soft, nontender, and nondistended.  Positive bowel sounds.    : No CVA tenderness  EXTREMITIES: left thigh with scar in lateral side with fluctuation and small opening with yellow discharge   NEUROLOGIC: grossly intact.  PSYCHIATRIC: Appropriate affect .  SKIN: No rash     Labs:                        12.5   6.62  )-----------( 366      ( 08 Mar 2023 06:02 )             41.1     03-08    143  |  108  |  19  ----------------------------<  122<H>  4.0   |  33<H>  |  1.30    Ca    9.2      08 Mar 2023 06:02    TPro  6.0  /  Alb  2.7<L>  /  TBili  0.3  /  DBili  x   /  AST  10<L>  /  ALT  13  /  AlkPhos  78  03-    Culture - Urine (collected 23 @ 20:15)  Source: Clean Catch Clean Catch (Midstream)  Final Report (23 @ 23:02):    <10,000 CFU/mL Normal Urogenital Maria Esther    Culture - Blood (collected 23 @ 15:53)  Source: .Blood Blood-Peripheral    Culture - Blood (collected 23 @ 15:43)  Source: .Blood Blood-Peripheral    WBC Count: 6.62 K/uL (23 @ 06:02)  WBC Count: 6.38 K/uL (23 @ 06:55)  WBC Count: 7.17 K/uL (23 @ 22:00)  WBC Count: 7.06 K/uL (23 @ 15:53)    Creatinine, Serum: 1.30 mg/dL (23 @ 06:02)  Creatinine, Serum: 1.30 mg/dL (23 @ 06:55)  Creatinine, Serum: 1.60 mg/dL (23 @ 22:00)  Creatinine, Serum: 1.50 mg/dL (23 @ 15:53)    C-Reactive Protein, Serum: 45 mg/L (23 @ 15:53)    Sedimentation Rate, Erythrocyte: 39 mm/hr (23 @ 15:53)     SARS-CoV-2 Result: NotDetec (23 @ 15:53)    All imaging and other data have been reviewed.  < from: MR Femur No Cont, Left (22 @ 13:40) >  IMPRESSION:  Chronic osteomyelitis with deformity of bone and with scattered areas of   T2 hyperintensity, less prominent than on the previous MRI of 2021,   however cannot exclude superimposed acute osteomyelitis/intraosseous   abscess.  Fluid tract extending from the chronic bony defect is contiguous with a   soft tissue abscess centered deep to the vastus intermedius measuring  12.9 cm craniocaudally, with surrounding surrounding muscle and soft   tissue edema.    Assessment and Plan:   81yo man with PMH of HTN, COPD on home O2, CAD s/p CABG, PVD s/p stents in b/l legs and left femoral fracture more than 50 years ago, was admitted at [facility] in 2021 with left  leg pain on the site of old fracture after CT showed possible intramedullary abscess. He had pain and infection in the old fracture area at least 3-4 times in the past, had multiple surgeries   and drainage of area with a long course of antibiotic treatment.  MRI showed collection, intraosseous abscess  S/P IR drain placement on 21  IR culture NGTD but had another culture with enterobacter.   Completed 6 weeks of ceftriaxone 2gm with PICC line in 2022  He was seen in the clinic and was started on suppressive ciprofloxacin for good oral bioavailability and also based on the culture.  MRI 2022 done showed 12.9cm collection with OM.   He stopped cipro sometimes last year and now feels more pain and swelling in area and started draining again.   No fever or chills at this time. Has restarted cipro few weeks ago again.     Recommendations:  - Continue on cefepime 2gm q12  - MRI result noted, IR has been consulted for possible drain placement   - Wound discharge sent for culture will follow results     Will follow.  Discussed with the primary team.     Brandi العلي MD  Division of Infectious Diseases   Please call ID service at [phone] with any question.      35 minutes spent on total encounter assessing patient, examination, chart review, counseling and coordinating care by the attending physician/nurse/care manager.

## 2023-03-08 NOTE — CONSULT NOTE ADULT - SUBJECTIVE AND OBJECTIVE BOX
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   Patient follow with ID dr العلي for mgt of his chronic femoral osteomyelitis . Has abscess and drainage in 2022. Present with one day of new yellowish  drainage from left thigh. No fever or chills or other constitutional symptoms. patient was consulted due to expanding abscess and continues drainage from the site. patient well known to Dr العلي has been treated for similar pathology last year with IR placement of drain and long term antibiotics which as per wife has been "in check" since.     < from: MR Femur No Cont, Left (23 @ 15:18) >  MPRESSION:  Chronic osteomyelitis with deformity of the mid to distal femur with   increase in the osseous marrow edema and loss of normal T1 fatty marrow   adjacent to the defect concerning for acute on chronic osteomyelitis.   Increase in size in the soft tissue abscess centered deep to the vastus   intermedius measuring up to 22cm CC.    < end of copied text >    PAST MEDICAL & SURGICAL HISTORY:  Diabetes Mellitus, Type II      CAD (Coronary Artery Disease)  s/p 3v CABG ; stents placed [place] in [place]      Dyslipidemia      Osteomyelitis      COPD (chronic obstructive pulmonary disease)  on 2L at home and BiPAP at night; intubated       Hypertension      PVD (peripheral vascular disease)      History of PAT (paroxysmal atrial tachycardia)      Asthma with COPD      BPH (benign prostatic hyperplasia)      Acute osteomyelitis      CABG (Coronary Artery Bypass Graft)        Compound fracture  left leg      S/P primary angioplasty with coronary stent      H/O drainage of abscess  Left femur 2021          Review of Systems:    I have reviewed 9 systems with the patient and the only positive findings were     MEDICATIONS  (STANDING):  apixaban 2.5 milliGRAM(s) Oral every 12 hours  ascorbic acid 500 milliGRAM(s) Oral daily  aspirin enteric coated 81 milliGRAM(s) Oral daily  atorvastatin 20 milliGRAM(s) Oral at bedtime  budesonide 160 MICROgram(s)/formoterol 4.5 MICROgram(s) Inhaler 2 Puff(s) Inhalation two times a day  cefepime   IVPB      cefepime   IVPB 2000 milliGRAM(s) IV Intermittent every 12 hours  cholecalciferol 400 Unit(s) Oral daily  cyanocobalamin 1000 MICROGram(s) Oral daily  dextrose 5%. 1000 milliLiter(s) (50 mL/Hr) IV Continuous <Continuous>  dextrose 5%. 1000 milliLiter(s) (100 mL/Hr) IV Continuous <Continuous>  dextrose 50% Injectable 25 Gram(s) IV Push once  dextrose 50% Injectable 12.5 Gram(s) IV Push once  dextrose 50% Injectable 25 Gram(s) IV Push once  glucagon  Injectable 1 milliGRAM(s) IntraMuscular once  insulin glargine Injectable (LANTUS) 15 Unit(s) SubCutaneous at bedtime  insulin lispro (ADMELOG) corrective regimen sliding scale   SubCutaneous three times a day before meals  insulin lispro (ADMELOG) corrective regimen sliding scale   SubCutaneous at bedtime  metoprolol tartrate 50 milliGRAM(s) Oral two times a day  montelukast 10 milliGRAM(s) Oral daily  multivitamin 1 Tablet(s) Oral daily  Nephro-carlos 1 Tablet(s) Oral daily  tamsulosin 0.4 milliGRAM(s) Oral at bedtime  tiotropium 2.5 MICROgram(s) Inhaler 2 Puff(s) Inhalation daily    MEDICATIONS  (PRN):  albuterol/ipratropium for Nebulization 3 milliLiter(s) Nebulizer every 6 hours PRN Shortness of Breath and/or Wheezing  dextrose Oral Gel 15 Gram(s) Oral once PRN Blood Glucose LESS THAN 70 milliGRAM(s)/deciliter      Allergies    [This allergen will not trigger allergy alert] IV DYE, IODINE CONTRAST (Unknown)  [This allergen will not trigger allergy alert] NKDA (Unknown)  latex (Unknown)    Intolerances    shellfish (Nausea)      SOCIAL HISTORY          Smoking: Yes [ ]  No [ x]   ______pk yrs          ETOH  Yes [ ]  No [ x]  Social [ ]          DRUGS:  Yes [ ]  No [ ]  if so what______________    FAMILY HISTORY:  Family history of diabetes mellitus (Sibling)    Family hx of lung cancer  brother,  age 82, used to smoke with pt        Vital Signs Last 24 Hrs  T(C): 36.5 (08 Mar 2023 05:29), Max: 36.7 (07 Mar 2023 20:12)  T(F): 97.7 (08 Mar 2023 05:29), Max: 98 (07 Mar 2023 20:12)  HR: 86 (08 Mar 2023 05:29) (72 - 95)  BP: 126/75 (08 Mar 2023 05:29) (100/64 - 143/81)  BP(mean): --  RR: 18 (08 Mar 2023 05:29) (18 - 18)  SpO2: 99% (08 Mar 2023 08:07) (98% - 100%)    Parameters below as of 08 Mar 2023 08:07  Patient On (Oxygen Delivery Method): room air        Physical Exam:    General:  Appears stated age, well-groomed, well-nourished, no distress  Eyes : VIOLETA  HENT:  WNL, no JVD  Chest:  clear breath sounds good inspiratory effort   Cardiovascular:  Regular rate & rhythm  Abdomen: soft NT ND    Extremities:  no edema , good capillary refill   Skin:  No rash  ext: left lateral thigh with an existing incisional scar  with the superior opening draining yellowish pleuritic fluid, no cellulitis noted around the site   Neuro/Psych:  Alert, oriented tp time, place and person       LABS:                        12.5   6.62  )-----------( 366      ( 08 Mar 2023 06:02 )             41.1     03-08    143  |  108  |  19  ----------------------------<  122<H>  4.0   |  33<H>  |  1.30    Ca    9.2      08 Mar 2023 06:02    TPro  6.0  /  Alb  2.7<L>  /  TBili  0.3  /  DBili  x   /  AST  10<L>  /  ALT  13  /  AlkPhos  78  03-07    PT/INR - ( 06 Mar 2023 15:53 )   PT: 12.6 sec;   INR: 1.08 ratio         PTT - ( 06 Mar 2023 15:53 )  PTT:39.1 sec  Urinalysis Basic - ( 06 Mar 2023 20:15 )    Color: Yellow / Appearance: Clear / S.015 / pH: x  Gluc: x / Ketone: Negative  / Bili: Negative / Urobili: Negative   Blood: x / Protein: Negative / Nitrite: Negative   Leuk Esterase: Negative / RBC: x / WBC x   Sq Epi: x / Non Sq Epi: x / Bacteria: x

## 2023-03-09 LAB
ANION GAP SERPL CALC-SCNC: 3 MMOL/L — LOW (ref 5–17)
BASOPHILS # BLD AUTO: 0.04 K/UL — SIGNIFICANT CHANGE UP (ref 0–0.2)
BASOPHILS NFR BLD AUTO: 0.4 % — SIGNIFICANT CHANGE UP (ref 0–2)
BUN SERPL-MCNC: 22 MG/DL — SIGNIFICANT CHANGE UP (ref 7–23)
CALCIUM SERPL-MCNC: 9.1 MG/DL — SIGNIFICANT CHANGE UP (ref 8.5–10.1)
CHLORIDE SERPL-SCNC: 109 MMOL/L — HIGH (ref 96–108)
CO2 SERPL-SCNC: 27 MMOL/L — SIGNIFICANT CHANGE UP (ref 22–31)
CREAT SERPL-MCNC: 1.4 MG/DL — HIGH (ref 0.5–1.3)
EGFR: 50 ML/MIN/1.73M2 — LOW
EOSINOPHIL # BLD AUTO: 0.07 K/UL — SIGNIFICANT CHANGE UP (ref 0–0.5)
EOSINOPHIL NFR BLD AUTO: 0.7 % — SIGNIFICANT CHANGE UP (ref 0–6)
GI PCR PANEL: SIGNIFICANT CHANGE UP
GLUCOSE SERPL-MCNC: 209 MG/DL — HIGH (ref 70–99)
HCT VFR BLD CALC: 26.7 % — LOW (ref 39–50)
HCT VFR BLD CALC: 34.9 % — LOW (ref 39–50)
HCT VFR BLD CALC: 38.8 % — LOW (ref 39–50)
HGB BLD-MCNC: 10.1 G/DL — LOW (ref 13–17)
HGB BLD-MCNC: 11.5 G/DL — LOW (ref 13–17)
HGB BLD-MCNC: 7.9 G/DL — LOW (ref 13–17)
IMM GRANULOCYTES NFR BLD AUTO: 0.9 % — SIGNIFICANT CHANGE UP (ref 0–0.9)
LYMPHOCYTES # BLD AUTO: 2.39 K/UL — SIGNIFICANT CHANGE UP (ref 1–3.3)
LYMPHOCYTES # BLD AUTO: 25 % — SIGNIFICANT CHANGE UP (ref 13–44)
MAGNESIUM SERPL-MCNC: 2 MG/DL — SIGNIFICANT CHANGE UP (ref 1.6–2.6)
MCHC RBC-ENTMCNC: 27.1 PG — SIGNIFICANT CHANGE UP (ref 27–34)
MCHC RBC-ENTMCNC: 29.6 GM/DL — LOW (ref 32–36)
MCV RBC AUTO: 91.5 FL — SIGNIFICANT CHANGE UP (ref 80–100)
MONOCYTES # BLD AUTO: 1.52 K/UL — HIGH (ref 0–0.9)
MONOCYTES NFR BLD AUTO: 15.9 % — HIGH (ref 2–14)
NEUTROPHILS # BLD AUTO: 5.45 K/UL — SIGNIFICANT CHANGE UP (ref 1.8–7.4)
NEUTROPHILS NFR BLD AUTO: 57.1 % — SIGNIFICANT CHANGE UP (ref 43–77)
NRBC # BLD: 0 /100 WBCS — SIGNIFICANT CHANGE UP (ref 0–0)
PLATELET # BLD AUTO: 441 K/UL — HIGH (ref 150–400)
POTASSIUM SERPL-MCNC: 4.2 MMOL/L — SIGNIFICANT CHANGE UP (ref 3.5–5.3)
POTASSIUM SERPL-SCNC: 4.2 MMOL/L — SIGNIFICANT CHANGE UP (ref 3.5–5.3)
RBC # BLD: 4.24 M/UL — SIGNIFICANT CHANGE UP (ref 4.2–5.8)
RBC # FLD: 12.8 % — SIGNIFICANT CHANGE UP (ref 10.3–14.5)
SODIUM SERPL-SCNC: 139 MMOL/L — SIGNIFICANT CHANGE UP (ref 135–145)
WBC # BLD: 9.56 K/UL — SIGNIFICANT CHANGE UP (ref 3.8–10.5)
WBC # FLD AUTO: 9.56 K/UL — SIGNIFICANT CHANGE UP (ref 3.8–10.5)

## 2023-03-09 PROCEDURE — 99233 SBSQ HOSP IP/OBS HIGH 50: CPT

## 2023-03-09 RX ORDER — PANTOPRAZOLE SODIUM 20 MG/1
40 TABLET, DELAYED RELEASE ORAL EVERY 12 HOURS
Refills: 0 | Status: DISCONTINUED | OUTPATIENT
Start: 2023-03-09 | End: 2023-03-19

## 2023-03-09 RX ORDER — SODIUM CHLORIDE 9 MG/ML
1000 INJECTION, SOLUTION INTRAVENOUS
Refills: 0 | Status: DISCONTINUED | OUTPATIENT
Start: 2023-03-09 | End: 2023-03-09

## 2023-03-09 RX ORDER — SODIUM CHLORIDE 9 MG/ML
1000 INJECTION INTRAMUSCULAR; INTRAVENOUS; SUBCUTANEOUS ONCE
Refills: 0 | Status: COMPLETED | OUTPATIENT
Start: 2023-03-09 | End: 2023-03-09

## 2023-03-09 RX ORDER — SODIUM CHLORIDE 9 MG/ML
500 INJECTION INTRAMUSCULAR; INTRAVENOUS; SUBCUTANEOUS ONCE
Refills: 0 | Status: COMPLETED | OUTPATIENT
Start: 2023-03-09 | End: 2023-03-09

## 2023-03-09 RX ORDER — SACCHAROMYCES BOULARDII 250 MG
250 POWDER IN PACKET (EA) ORAL
Refills: 0 | Status: DISCONTINUED | OUTPATIENT
Start: 2023-03-09 | End: 2023-03-22

## 2023-03-09 RX ADMIN — SODIUM CHLORIDE 50 MILLILITER(S): 9 INJECTION, SOLUTION INTRAVENOUS at 10:47

## 2023-03-09 RX ADMIN — Medication 250 MILLIGRAM(S): at 17:06

## 2023-03-09 RX ADMIN — TIOTROPIUM BROMIDE 2 PUFF(S): 18 CAPSULE ORAL; RESPIRATORY (INHALATION) at 05:41

## 2023-03-09 RX ADMIN — Medication 50 MILLIGRAM(S): at 05:31

## 2023-03-09 RX ADMIN — PREGABALIN 1000 MICROGRAM(S): 225 CAPSULE ORAL at 11:51

## 2023-03-09 RX ADMIN — MONTELUKAST 10 MILLIGRAM(S): 4 TABLET, CHEWABLE ORAL at 11:51

## 2023-03-09 RX ADMIN — SODIUM CHLORIDE 500 MILLILITER(S): 9 INJECTION INTRAMUSCULAR; INTRAVENOUS; SUBCUTANEOUS at 09:36

## 2023-03-09 RX ADMIN — Medication 500 MILLIGRAM(S): at 11:51

## 2023-03-09 RX ADMIN — APIXABAN 2.5 MILLIGRAM(S): 2.5 TABLET, FILM COATED ORAL at 05:31

## 2023-03-09 RX ADMIN — Medication 400 UNIT(S): at 11:51

## 2023-03-09 RX ADMIN — TAMSULOSIN HYDROCHLORIDE 0.4 MILLIGRAM(S): 0.4 CAPSULE ORAL at 22:10

## 2023-03-09 RX ADMIN — Medication 2: at 07:57

## 2023-03-09 RX ADMIN — ATORVASTATIN CALCIUM 20 MILLIGRAM(S): 80 TABLET, FILM COATED ORAL at 22:10

## 2023-03-09 RX ADMIN — CEFEPIME 100 MILLIGRAM(S): 1 INJECTION, POWDER, FOR SOLUTION INTRAMUSCULAR; INTRAVENOUS at 05:31

## 2023-03-09 RX ADMIN — Medication 4: at 17:04

## 2023-03-09 RX ADMIN — SODIUM CHLORIDE 500 MILLILITER(S): 9 INJECTION INTRAMUSCULAR; INTRAVENOUS; SUBCUTANEOUS at 16:33

## 2023-03-09 RX ADMIN — BUDESONIDE AND FORMOTEROL FUMARATE DIHYDRATE 2 PUFF(S): 160; 4.5 AEROSOL RESPIRATORY (INHALATION) at 18:34

## 2023-03-09 RX ADMIN — Medication 4: at 12:17

## 2023-03-09 RX ADMIN — CEFEPIME 100 MILLIGRAM(S): 1 INJECTION, POWDER, FOR SOLUTION INTRAMUSCULAR; INTRAVENOUS at 17:49

## 2023-03-09 RX ADMIN — PANTOPRAZOLE SODIUM 40 MILLIGRAM(S): 20 TABLET, DELAYED RELEASE ORAL at 17:06

## 2023-03-09 RX ADMIN — SODIUM CHLORIDE 500 MILLILITER(S): 9 INJECTION INTRAMUSCULAR; INTRAVENOUS; SUBCUTANEOUS at 12:20

## 2023-03-09 RX ADMIN — BUDESONIDE AND FORMOTEROL FUMARATE DIHYDRATE 2 PUFF(S): 160; 4.5 AEROSOL RESPIRATORY (INHALATION) at 05:40

## 2023-03-09 RX ADMIN — Medication 1 TABLET(S): at 12:19

## 2023-03-09 RX ADMIN — Medication 1 TABLET(S): at 11:51

## 2023-03-09 RX ADMIN — INSULIN GLARGINE 15 UNIT(S): 100 INJECTION, SOLUTION SUBCUTANEOUS at 22:10

## 2023-03-09 NOTE — PROVIDER CONTACT NOTE (OTHER) - ASSESSMENT
pt had large bowel movement on bedpan in bed. stool soft/ diarrhea like with large liquid cristobal red blood throughout. pt had large bowel movement on bedpan in bed. stool soft/ diarrhea like with large liquid cristobal red blood throughout. Foul odor noted.

## 2023-03-09 NOTE — PROGRESS NOTE ADULT - ASSESSMENT
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     OM  COURTNEY  Asthma  COPD  HTN  CAD  CABG hx    on NIV overnight  spoke with outpatient Pulm MD for setting clarification  on ABX  vs noted    NIV use night time and prn - 18/8 and 25 pct fio2  sleep hygiene reviewed  cvs rx regimen  BP control  pt is on Nucala in the office - Monthly with Dr Oscar Marcelino - [facility]  on Symbicort - Spiriva - Singulair - copd - asthma -   NEBS PRN  monitor VS and Sat  keep sat > 88 pct  spoke with pt - wife  outpatient records reviewed  emp ABX in progress - ID follow up  skin - wound care

## 2023-03-09 NOTE — CONSULT NOTE ADULT - SUBJECTIVE AND OBJECTIVE BOX
[facility]  Rich Sky PA-C  [address] [place] [id number]  [phone]      Chief Complaint:  Patient is a 82y old  Male who presents with a chief complaint of Left  thigh draining sinus (09 Mar 2023 09:13)      HPI:  82 yr male with history of Hypertension, COPD home oxygen dependent, CAD CABG , chronic left femor osteomyelitis since traumatic left femoral fracture in 1966.   Patient follow with ID dr العلي for mgt of his chronic femoral osteomyelitis . Has abscess and drainage in November 2022. Present with one day of new yellowish  drainage from left thigh. No fever or chills or other constitutional symptoms.     INTERVAL HPI:  Pt s/e with wife at bedside  Reports same history as above  Overnight pt reportedly had BRBPR  Per wife, pt's most recent BM was dark brown    Allergies:  [This allergen will not trigger allergy alert] IV DYE, IODINE CONTRAST (Unknown)  [This allergen will not trigger allergy alert] NKDA (Unknown)  latex (Unknown)  shellfish (Nausea)      Medications:  albuterol/ipratropium for Nebulization 3 milliLiter(s) Nebulizer every 6 hours PRN  ascorbic acid 500 milliGRAM(s) Oral daily  atorvastatin 20 milliGRAM(s) Oral at bedtime  budesonide 160 MICROgram(s)/formoterol 4.5 MICROgram(s) Inhaler 2 Puff(s) Inhalation two times a day  cefepime   IVPB      cefepime   IVPB 2000 milliGRAM(s) IV Intermittent every 12 hours  cholecalciferol 400 Unit(s) Oral daily  cyanocobalamin 1000 MICROGram(s) Oral daily  dextrose 5%. 1000 milliLiter(s) IV Continuous <Continuous>  dextrose 5%. 1000 milliLiter(s) IV Continuous <Continuous>  dextrose 50% Injectable 25 Gram(s) IV Push once  dextrose 50% Injectable 12.5 Gram(s) IV Push once  dextrose 50% Injectable 25 Gram(s) IV Push once  dextrose Oral Gel 15 Gram(s) Oral once PRN  glucagon  Injectable 1 milliGRAM(s) IntraMuscular once  insulin glargine Injectable (LANTUS) 15 Unit(s) SubCutaneous at bedtime  insulin lispro (ADMELOG) corrective regimen sliding scale   SubCutaneous three times a day before meals  insulin lispro (ADMELOG) corrective regimen sliding scale   SubCutaneous at bedtime  metoprolol tartrate 50 milliGRAM(s) Oral two times a day  montelukast 10 milliGRAM(s) Oral daily  multivitamin 1 Tablet(s) Oral daily  Nephro-carlos 1 Tablet(s) Oral daily  pantoprazole  Injectable 40 milliGRAM(s) IV Push every 12 hours  saccharomyces boulardii 250 milliGRAM(s) Oral two times a day  tamsulosin 0.4 milliGRAM(s) Oral at bedtime  tiotropium 2.5 MICROgram(s) Inhaler 2 Puff(s) Inhalation daily      PMHX/PSHX:  Diabetes Mellitus, Type II    CAD (Coronary Artery Disease)    Dyslipidemia    Asymptomatic Peripheral Vascular Disease    Osteomyelitis    COPD (chronic obstructive pulmonary disease)    Diabetes mellitus    Hypertension    PVD (peripheral vascular disease)    History of PAT (paroxysmal atrial tachycardia)    Asthma with COPD    BPH (benign prostatic hyperplasia)    Acute osteomyelitis    CABG (Coronary Artery Bypass Graft)    Compound fracture    S/P primary angioplasty with coronary stent    H/O drainage of abscess        Family history:  Family history of diabetes mellitus (Sibling)    Family hx of lung cancer      ROS:   General:  No fevers, chills, night sweats, fatigue  Eyes:  Good vision, no reported pain  ENT:  No sore throat, pain, runny nose, dysphagia  CV:  No pain, palpitations, hypo/hypertension  Resp:  No dyspnea, cough, tachypnea, wheezing  GI:  No pain, No nausea, No vomiting, No diarrhea, No constipation, No weight loss, No fever, No pruritis, +rectal bleeding, +tarry stools, No dysphagia  :  No pain, bleeding, incontinence, nocturia  Muscle:  No pain, weakness  Neuro:  No weakness, tingling, memory problems  Psych:  No fatigue, insomnia, mood problems, depression  Endocrine:  No polyuria, polydipsia, cold/heat intolerance  Heme:  No petechiae, ecchymosis, easy bruisability  Skin:  No rash, tattoos, scars, edema      PHYSICAL EXAM:   Vital Signs:  Vital Signs Last 24 Hrs  T(C): 36.4 (09 Mar 2023 09:15), Max: 36.4 (08 Mar 2023 20:52)  T(F): 97.6 (09 Mar 2023 09:15), Max: 97.6 (09 Mar 2023 09:15)  HR: 101 (09 Mar 2023 11:56) (87 - 116)  BP: 91/63 (09 Mar 2023 11:56) (82/57 - 157/79)  BP(mean): --  RR: 16 (09 Mar 2023 11:56) (16 - 18)  SpO2: 91% (09 Mar 2023 11:56) (90% - 100%)    Parameters below as of 09 Mar 2023 11:56  Patient On (Oxygen Delivery Method): room air    GENERAL:  Appears stated age  HEENT:  NC/AT  CHEST:  Full & symmetric excursion  HEART:  Regular rhythm  ABDOMEN:  Soft, non-tender, non-distended  EXTEREMITIES:  no cyanosis, clubbing or edema  SKIN:  No rash  NEURO:  Alert    LABS:                        10.1   x     )-----------( x        ( 09 Mar 2023 12:07 )             34.9     03-09    139  |  109<H>  |  22  ----------------------------<  209<H>  4.2   |  27  |  1.40<H>    Ca    9.1      09 Mar 2023 06:55  Mg     2.0     03-09

## 2023-03-09 NOTE — CONSULT NOTE ADULT - ASSESSMENT
anemia  GIB    Per charts pt had BRBPR and dark stool  Hgb noted  Monitor cbc  Transfuse prn  PPI for ppx  To discuss endoscopic eval  Will follow    I reviewed the overnight course of events on the unit, re-confirming the patient history. I discussed the care with the patient and their family  Differential diagnosis and plan of care discussed with patient after the evaluation  35 minutes spent on total encounter of which more than fifty percent of the encounter was spent counseling and/or coordinating care by the attending physician.  Advanced care planning was discussed with patient and family.  Advanced care planning forms were reviewed and discussed.  Risks, benefits and alternatives of gastroenterologic procedures were discussed in detail and all questions were answered.   anemia  GIB    Per charts pt had BRBPR and dark stool  Hgb noted  Monitor cbc  Transfuse prn  PPI for ppx  May need eventual endoscopic eval; however last eliquis dose was this am  D/w pt and wife  Will follow    I reviewed the overnight course of events on the unit, re-confirming the patient history. I discussed the care with the patient and their family  Differential diagnosis and plan of care discussed with patient after the evaluation  35 minutes spent on total encounter of which more than fifty percent of the encounter was spent counseling and/or coordinating care by the attending physician.  Advanced care planning was discussed with patient and family.  Advanced care planning forms were reviewed and discussed.  Risks, benefits and alternatives of gastroenterologic procedures were discussed in detail and all questions were answered.

## 2023-03-09 NOTE — PROGRESS NOTE ADULT - ASSESSMENT
82 yr male with history of CAD previous CABG with 3 bypass on plavix , Hypertension, COPD  home oxygen dependent, long standing chronic left femoral osteomyelitis following traumatic fracture of  femur in 1966 , present with new drainage from left thigh chronic osteomyelitis .      Problem/Plan - 1:  ·  Problem: Chronic osteomyelitis.   ·  Plan:  Had abscess and drainage in November 2022  Had been variously treated with antibiotics   ID consulted  - Cefepime 2 g iv q 12.   Left femur MRI: Chronic osteomyelitis with deformity of the mid to distal femur with increase in the osseous marrow edema and loss of normal T1 fatty marrow adjacent to the defect concerning for acute on chronic osteomyelitis.   Increase in size in the soft tissue abscess centered deep to the vastus intermedius measuring up to 22cm CC.  Wound care.   IR guided drain placement for abscess     Problem/Plan - 2:  ·  Problem: HTN (hypertension).   ·  Plan: Metoprolol 50 mg po bid as home dose.     Problem/Plan - 3:  ·  Problem: DM (diabetes mellitus), type 2.   ·  Plan: Insulin sliding scale , on home Jardiance   RISS, monitor FS.     Problem/Plan - 4:  ·  Problem: CAD (coronary artery disease).   ·  Plan: c/w ASA AS Home dose   Atorvastatin 20 mg po daily.     Problem/Plan - 5:  ·  Problem: COPD, moderate.   ·  Plan: Inhaled broncho dilators , on home incruse and breo-formulary interchange   Singulair 10 mg po hs  not on home Prednisone, will d/c  on BIPAP at night   Xray: < from: Xray Chest 1 View- PORTABLE-Urgent (Xray Chest 1 View- PORTABLE-Urgent .) (03.07.23 @ 09:59) >    IMPRESSION: COPD left base scarring again noted.  pulmonary f/u     Problem/Plan - 6:  ·  Problem: History of atrial paroxysmal tachycardia.   ·  Plan:  Hold Apixaban 2.5 mg po bid given concern for GIB     Problem/Plan - 7:  ·  Problem: BPH with obstruction/lower urinary tract symptoms.   ·  Plan: Tamsulosin 0.4 mg po daily.    GI bleed:  BRBPR this AM.  Hold Eliquis.  Will repeat H/H@4PM.  GI consulted    VTE ppx:  SCD       82 yr male with history of CAD previous CABG with 3 bypass on plavix , Hypertension, COPD  home oxygen dependent, long standing chronic left femoral osteomyelitis following traumatic fracture of  femur in 1966 , present with new drainage from left thigh chronic osteomyelitis .      Problem/Plan - 1:  ·  Problem: Chronic osteomyelitis.   ·  Plan:  Had abscess and drainage in November 2022  Had been variously treated with antibiotics   ID consulted  - Cefepime 2 g iv q 12.   Left femur MRI: Chronic osteomyelitis with deformity of the mid to distal femur with increase in the osseous marrow edema and loss of normal T1 fatty marrow adjacent to the defect concerning for acute on chronic osteomyelitis.   Increase in size in the soft tissue abscess centered deep to the vastus intermedius measuring up to 22cm CC.  Wound care.   IR guided drain placement for abscess     Problem/Plan - 2:  ·  Problem: HTN (hypertension).   ·  Plan: Metoprolol 50 mg po bid as home dose.     Problem/Plan - 3:  ·  Problem: DM (diabetes mellitus), type 2.   ·  Plan: Insulin sliding scale , on home Jardiance   RISS, monitor FS.     Problem/Plan - 4:  ·  Problem: CAD (coronary artery disease).   ·  Plan: c/w ASA AS Home dose   Atorvastatin 20 mg po daily.     Problem/Plan - 5:  ·  Problem: COPD, moderate.   ·  Plan: Inhaled broncho dilators , on home incruse and breo-formulary interchange   Singulair 10 mg po hs  not on home Prednisone, will d/c  on BIPAP at night   Xray: < from: Xray Chest 1 View- PORTABLE-Urgent (Xray Chest 1 View- PORTABLE-Urgent .) (03.07.23 @ 09:59) >    IMPRESSION: COPD left base scarring again noted.  pulmonary f/u     Problem/Plan - 6:  ·  Problem: History of atrial paroxysmal tachycardia.   ·  Plan:  Hold Apixaban 2.5 mg po bid given concern for GIB     Problem/Plan - 7:  ·  Problem: BPH with obstruction/lower urinary tract symptoms.   ·  Plan: Tamsulosin 0.4 mg po daily.    GI bleed:  BRBPR this AM.  Hold Eliquis.  Will repeat H/H@4PM.  GI consulted    MERRILL:  start NS@50cc/hr x 10 hours.     VTE ppx:  SCD       82 yr male with history of CAD previous CABG with 3 bypass on plavix , Hypertension, COPD  home oxygen dependent, long standing chronic left femoral osteomyelitis following traumatic fracture of  femur in 1966 , present with new drainage from left thigh chronic osteomyelitis .      Problem/Plan - 1:  ·  Problem: Chronic osteomyelitis.   ·  Plan:  Had abscess and drainage in November 2022  Had been variously treated with antibiotics   ID consulted  - Cefepime 2 g iv q 12.   Left femur MRI: Chronic osteomyelitis with deformity of the mid to distal femur with increase in the osseous marrow edema and loss of normal T1 fatty marrow adjacent to the defect concerning for acute on chronic osteomyelitis.   Increase in size in the soft tissue abscess centered deep to the vastus intermedius measuring up to 22cm CC.  Wound care.   IR guided drain placement for abscess     Problem/Plan - 2:  ·  Problem: HTN (hypertension).   ·  Plan: Metoprolol 50 mg po bid as home dose.     Problem/Plan - 3:  ·  Problem: DM (diabetes mellitus), type 2.   ·  Plan: Insulin sliding scale , on home Jardiance   RISS, monitor FS.     Problem/Plan - 4:  ·  Problem: CAD (coronary artery disease).   ·  Plan: hold ASA  due to GIB   Atorvastatin 20 mg po daily.     Problem/Plan - 5:  ·  Problem: COPD, moderate.   ·  Plan: Inhaled broncho dilators , on home incruse and breo-formulary interchange   Singulair 10 mg po hs  not on home Prednisone, will d/c  on BIPAP at night   Xray: < from: Xray Chest 1 View- PORTABLE-Urgent (Xray Chest 1 View- PORTABLE-Urgent .) (03.07.23 @ 09:59) >    IMPRESSION: COPD left base scarring again noted.  pulmonary f/u     Problem/Plan - 6:  ·  Problem: History of atrial paroxysmal tachycardia.   ·  Plan:  Hold Apixaban 2.5 mg po bid given concern for GIB     Problem/Plan - 7:  ·  Problem: BPH with obstruction/lower urinary tract symptoms.   ·  Plan: Tamsulosin 0.4 mg po daily.    GI bleed:  BRBPR this AM.  Hold Eliquis.  Will repeat H/H stat.  GI consulted    MERRILL:  start NS@50cc/hr x 10 hours.     VTE ppx:  SCD

## 2023-03-09 NOTE — PROGRESS NOTE ADULT - SUBJECTIVE AND OBJECTIVE BOX
Date/Time Patient Seen:  		  Referring MD:   Data Reviewed	       Patient is a 82y old  Male who presents with a chief complaint of Left  thigh draining sinus (08 Mar 2023 14:14)      Subjective/HPI     PAST MEDICAL & SURGICAL HISTORY:  Diabetes Mellitus, Type II    CAD (Coronary Artery Disease)  s/p 3v CABG 2004; stents placed in [place] in 2019    Dyslipidemia    Asymptomatic Peripheral Vascular Disease    Osteomyelitis    COPD (chronic obstructive pulmonary disease)  on 2L at home and BiPAP at night; intubated 6/18    Diabetes mellitus    Hypertension    PVD (peripheral vascular disease)    History of PAT (paroxysmal atrial tachycardia)    Asthma with COPD    BPH (benign prostatic hyperplasia)    Acute osteomyelitis    CABG (Coronary Artery Bypass Graft)  2004    Compound fracture  left leg    S/P primary angioplasty with coronary stent    H/O drainage of abscess  Left femur 12/2021          Medication list         MEDICATIONS  (STANDING):  apixaban 2.5 milliGRAM(s) Oral every 12 hours  ascorbic acid 500 milliGRAM(s) Oral daily  aspirin enteric coated 81 milliGRAM(s) Oral daily  atorvastatin 20 milliGRAM(s) Oral at bedtime  budesonide 160 MICROgram(s)/formoterol 4.5 MICROgram(s) Inhaler 2 Puff(s) Inhalation two times a day  cefepime   IVPB      cefepime   IVPB 2000 milliGRAM(s) IV Intermittent every 12 hours  cholecalciferol 400 Unit(s) Oral daily  cyanocobalamin 1000 MICROGram(s) Oral daily  dextrose 5%. 1000 milliLiter(s) (50 mL/Hr) IV Continuous <Continuous>  dextrose 5%. 1000 milliLiter(s) (100 mL/Hr) IV Continuous <Continuous>  dextrose 50% Injectable 25 Gram(s) IV Push once  dextrose 50% Injectable 12.5 Gram(s) IV Push once  dextrose 50% Injectable 25 Gram(s) IV Push once  glucagon  Injectable 1 milliGRAM(s) IntraMuscular once  insulin glargine Injectable (LANTUS) 15 Unit(s) SubCutaneous at bedtime  insulin lispro (ADMELOG) corrective regimen sliding scale   SubCutaneous three times a day before meals  insulin lispro (ADMELOG) corrective regimen sliding scale   SubCutaneous at bedtime  metoprolol tartrate 50 milliGRAM(s) Oral two times a day  montelukast 10 milliGRAM(s) Oral daily  multivitamin 1 Tablet(s) Oral daily  Nephro-carlos 1 Tablet(s) Oral daily  tamsulosin 0.4 milliGRAM(s) Oral at bedtime  tiotropium 2.5 MICROgram(s) Inhaler 2 Puff(s) Inhalation daily    MEDICATIONS  (PRN):  albuterol/ipratropium for Nebulization 3 milliLiter(s) Nebulizer every 6 hours PRN Shortness of Breath and/or Wheezing  dextrose Oral Gel 15 Gram(s) Oral once PRN Blood Glucose LESS THAN 70 milliGRAM(s)/deciliter         Vitals log        ICU Vital Signs Last 24 Hrs  T(C): 36.4 (09 Mar 2023 05:07), Max: 36.4 (08 Mar 2023 12:48)  T(F): 97.5 (09 Mar 2023 05:07), Max: 97.6 (08 Mar 2023 12:48)  HR: 94 (09 Mar 2023 05:07) (87 - 116)  BP: 113/72 (09 Mar 2023 05:07) (107/67 - 157/79)  BP(mean): --  ABP: --  ABP(mean): --  RR: 18 (09 Mar 2023 05:07) (18 - 18)  SpO2: 90% (09 Mar 2023 05:07) (83% - 99%)    O2 Parameters below as of 09 Mar 2023 05:07  Patient On (Oxygen Delivery Method): BiPAP/CPAP  O2 Flow (L/min): 5               Input and Output:  I&O's Detail    08 Mar 2023 07:01  -  09 Mar 2023 05:38  --------------------------------------------------------  IN:    Oral Fluid: 840 mL  Total IN: 840 mL    OUT:    Voided (mL): 300 mL  Total OUT: 300 mL    Total NET: 540 mL          Lab Data                        12.5   6.62  )-----------( 366      ( 08 Mar 2023 06:02 )             41.1     03-08    143  |  108  |  19  ----------------------------<  122<H>  4.0   |  33<H>  |  1.30    Ca    9.2      08 Mar 2023 06:02    TPro  6.0  /  Alb  2.7<L>  /  TBili  0.3  /  DBili  x   /  AST  10<L>  /  ALT  13  /  AlkPhos  78  03-07            Review of Systems	      Objective     Physical Examination    heart s1s2  lung dec BS      Pertinent Lab findings & Imaging      Eliecer:  NO   Adequate UO     I&O's Detail    08 Mar 2023 07:01  -  09 Mar 2023 05:38  --------------------------------------------------------  IN:    Oral Fluid: 840 mL  Total IN: 840 mL    OUT:    Voided (mL): 300 mL  Total OUT: 300 mL    Total NET: 540 mL               Discussed with:     Cultures:	        Radiology

## 2023-03-09 NOTE — CONSULT NOTE ADULT - NSCONSULTADDITIONALINFOA_GEN_ALL_CORE
last dose of eliquis today  defer endoscopic workup for at least 48 hours from last dose  hold a/c  diet as tolerated  cbc daily  patient and wife unsure if they want endoscopic eval

## 2023-03-09 NOTE — PROGRESS NOTE ADULT - SUBJECTIVE AND OBJECTIVE BOX
pt seen and examined  no complaints  D/W ID and family  will go with IR drainage and long term abx  no gen surg issues.    signing off, please call if needed

## 2023-03-09 NOTE — PROVIDER CONTACT NOTE (OTHER) - ASSESSMENT
pt oob in chair and complained of dizziness. manual B/P 88/62 . pt put back to bed with 2 assist . O2nc 5L in progress. pt awake and resting comfortably.

## 2023-03-09 NOTE — PROGRESS NOTE ADULT - SUBJECTIVE AND OBJECTIVE BOX
[facility]  INFECTIOUS DISEASES   [address] [place] [address]  Tel: [phone]     Fax: [phone]  ========================================================  MD Noman Perry Kaushal, MD Cho, Michelle, MD Sunjit, Jaspal, MD  ========================================================    [id number]  [facility]     Follow up: left thigh abscess and OM    No fever, still with discharge from left thigh.   Had blood is stool this morning so getting work up by GI. Also had some drop in H/H.     PAST MEDICAL & SURGICAL HISTORY:  Diabetes Mellitus, Type II  CAD (Coronary Artery Disease)  s/p 3v CABG ; stents placed [place] in   Dyslipidemia  Osteomyelitis  COPD (chronic obstructive pulmonary disease)  on 2L at home and BiPAP at night; intubated   Hypertension  PVD (peripheral vascular disease)  History of PAT (paroxysmal atrial tachycardia)  Asthma with COPD  BPH (benign prostatic hyperplasia)  Acute osteomyelitis  CABG (Coronary Artery Bypass Graft)    Compound fracture  left leg  S/P primary angioplasty with coronary stent  H/O drainage of abscess  Left femur 2021    Social Hx: No current smoking, ETOH or drugs     FAMILY HISTORY:  Family history of diabetes mellitus (Sibling)    Family hx of lung cancer  brother,  age 82, used to smoke with pt    Allergies  No Known Allergies    Intolerances  shellfish (Nausea)    Antibiotics:  Ciprofloxacin      REVIEW OF SYSTEMS:  CONSTITUTIONAL:  No Fever or chills  HEENT:  No diplopia or blurred vision.  No sore throat or runny nose.  CARDIOVASCULAR:  No chest pain or SOB.  RESPIRATORY:  No cough, shortness of breath, PND or orthopnea.  GASTROINTESTINAL:  No nausea, vomiting or diarrhea.  GENITOURINARY:  No dysuria, frequency or urgency. No Blood in urine  MUSCULOSKELETAL:  left thigh pain and drainage   SKIN:  No change in skin, hair or nails.  NEUROLOGIC:  No paresthesias or weakness.  PSYCHIATRIC:  No disorder of thought or mood.  ENDOCRINE:  No heat or cold intolerance, polyuria or polydipsia.  HEMATOLOGICAL:  No easy bruising or bleeding.     Physical Exam:  Vital Signs Last 24 Hrs  T(C): 36.4 (09 Mar 2023 14:52), Max: 36.4 (08 Mar 2023 20:52)  T(F): 97.6 (09 Mar 2023 14:52), Max: 97.6 (09 Mar 2023 09:15)  HR: 101 (09 Mar 2023 11:56) (87 - 116)  BP: 92/66 (09 Mar 2023 14:42) (82/57 - 157/79)  BP(mean): --  RR: 16 (09 Mar 2023 11:56) (16 - 18)  SpO2: 91% (09 Mar 2023 11:56) (90% - 100%)  Parameters below as of 09 Mar 2023 11:56  Patient On (Oxygen Delivery Method): room air  GEN: NAD  HEENT: normocephalic and atraumatic. EOMI. PERRL.    NECK: Supple.  No lymphadenopathy   LUNGS: poor air movement   HEART: Regular rate and rhythm   ABDOMEN: Soft, nontender, and nondistended.  Positive bowel sounds.    : No CVA tenderness  EXTREMITIES: left thigh with scar in lateral side with fluctuation and small opening with yellow discharge   NEUROLOGIC: grossly intact.  PSYCHIATRIC: Appropriate affect .  SKIN: No rash     Labs:                        10.1   x     )-----------( x        ( 09 Mar 2023 12:07 )             34.9         139  |  109<H>  |  22  ----------------------------<  209<H>  4.2   |  27  |  1.40<H>    Ca    9.1      09 Mar 2023 06:55  Mg     2.0         Culture - Abscess with Gram Stain (collected 23 @ 13:50)  Source: .Abscess left thigh    Culture - Urine (collected 23 @ 20:15)  Source: Clean Catch Clean Catch (Midstream)  Final Report (23 @ 23:02):    <10,000 CFU/mL Normal Urogenital Maria Esther    Culture - Blood (collected 03-06-23 @ 15:53)  Source: .Blood Blood-Peripheral    Culture - Blood (collected 23 @ 15:43)  Source: .Blood Blood-Peripheral    WBC Count: 9.56 K/uL (23 @ 06:55)  WBC Count: 6.62 K/uL (23 @ 06:02)  WBC Count: 6.38 K/uL (23 @ 06:55)  WBC Count: 7.17 K/uL (23 @ 22:00)  WBC Count: 7.06 K/uL (23 @ 15:53)    Creatinine, Serum: 1.40 mg/dL (23 @ 06:55)  Creatinine, Serum: 1.30 mg/dL (23 @ 06:02)  Creatinine, Serum: 1.30 mg/dL (23 @ 06:55)  Creatinine, Serum: 1.60 mg/dL (23 @ 22:00)  Creatinine, Serum: 1.50 mg/dL (23 @ 15:53)    C-Reactive Protein, Serum: 45 mg/L (23 @ 15:53)    Sedimentation Rate, Erythrocyte: 39 mm/hr (23 @ 15:53)     SARS-CoV-2 Result: NotDetec (23 @ 15:53)    All imaging and other data have been reviewed.  < from: MR Femur No Cont, Left (0516.22 @ 13:40) >  IMPRESSION:  Chronic osteomyelitis with deformity of bone and with scattered areas of   T2 hyperintensity, less prominent than on the previous MRI of 2021,   however cannot exclude superimposed acute osteomyelitis/intraosseous   abscess.  Fluid tract extending from the chronic bony defect is contiguous with a   soft tissue abscess centered deep to the vastus intermedius measuring  12.9 cm craniocaudally, with surrounding surrounding muscle and soft   tissue edema.    Assessment and Plan:   81yo man with PMH of HTN, COPD on home O2, CAD s/p CABG, PVD s/p stents in b/l legs and left femoral fracture more than 50 years ago, was admitted at [facility] in 2021 with left  leg pain on the site of old fracture after CT showed possible intramedullary abscess. He had pain and infection in the old fracture area at least 3-4 times in the past, had multiple surgeries   and drainage of area with a long course of antibiotic treatment.  MRI showed collection, intraosseous abscess  S/P IR drain placement on 21  IR culture NGTD but had another culture with enterobacter.   Completed 6 weeks of ceftriaxone 2gm with PICC line in 2022  He was seen in the clinic and was started on suppressive ciprofloxacin for good oral bioavailability and also based on the culture.  MRI 2022 done showed 12.9cm collection with OM.   He stopped cipro sometimes last year and now feels more pain and swelling in area and started draining again.   Now MRI with 22cm collection.   This morning had Blood in stook, so GI seen him, had drop in H/H.     Recommendations:  - Continue on cefepime 2gm q12  - MRI result noted, IR has been consulted for possible drain placement   - Wound discharge sent for culture so far CoNS  - GI work up for rectal bleeding     Will follow.    Brandi العلي MD  Division of Infectious Diseases   Please call ID service at [phone] with any question.      35 minutes spent on total encounter assessing patient, examination, chart review, counseling and coordinating care by the attending physician/nurse/care manager.

## 2023-03-09 NOTE — PROGRESS NOTE ADULT - SUBJECTIVE AND OBJECTIVE BOX
CHIEF COMPLAINT/INTERVAL HISTORY:  Pt. seen and evaluated for chronic osteomyelitis.  Pt. reports having loose BM with BRBPR this morning.  Now feels lightheaded.  Denies CP or SOB.  Tolerating IV antibiotics.     REVIEW OF SYSTEMS:  No fever, CP, SOB, or abdominal pain    Vital Signs Last 24 Hrs  T(C): 36.4 (09 Mar 2023 05:07), Max: 36.4 (08 Mar 2023 12:48)  T(F): 97.5 (09 Mar 2023 05:07), Max: 97.6 (08 Mar 2023 12:48)  HR: 94 (09 Mar 2023 05:07) (87 - 116)  BP: 113/72 (09 Mar 2023 05:07) (107/67 - 157/79)  BP(mean): --  RR: 18 (09 Mar 2023 05:07) (18 - 18)  SpO2: 99% (09 Mar 2023 07:24) (83% - 99%)    Parameters below as of 09 Mar 2023 07:24  Patient On (Oxygen Delivery Method): nasal cannula  O2 Flow (L/min): 5      PHYSICAL EXAM:  GENERAL: NAD  HEENT: EOMI, hearing normal, conjunctiva and sclera clear  Chest: CTA bilaterally, no wheezing  CV: S1S2, RRR,   GI: soft, +BS, NT/ND  Musculoskeletal: no edema, dressing over left lateral thigh  Psychiatric: affect nL, mood nL  Skin: warm and dry    LABS:                        11.5   9.56  )-----------( 441      ( 09 Mar 2023 06:55 )             38.8     03-09    139  |  109<H>  |  22  ----------------------------<  209<H>  4.2   |  27  |  1.40<H>    Ca    9.1      09 Mar 2023 06:55  Mg     2.0     03-09           Yes

## 2023-03-09 NOTE — PROVIDER CONTACT NOTE (OTHER) - ASSESSMENT
pt in bathroom . pt noted to have bright red blood in toilet. stool noted to be formed. pt denies pain at this time.

## 2023-03-10 ENCOUNTER — TRANSCRIPTION ENCOUNTER (OUTPATIENT)
Age: 83
End: 2023-03-10

## 2023-03-10 LAB
ANION GAP SERPL CALC-SCNC: 3 MMOL/L — LOW (ref 5–17)
BASOPHILS # BLD AUTO: 0.05 K/UL — SIGNIFICANT CHANGE UP (ref 0–0.2)
BASOPHILS NFR BLD AUTO: 0.5 % — SIGNIFICANT CHANGE UP (ref 0–2)
BUN SERPL-MCNC: 25 MG/DL — HIGH (ref 7–23)
CALCIUM SERPL-MCNC: 8.7 MG/DL — SIGNIFICANT CHANGE UP (ref 8.5–10.1)
CHLORIDE SERPL-SCNC: 108 MMOL/L — SIGNIFICANT CHANGE UP (ref 96–108)
CO2 SERPL-SCNC: 26 MMOL/L — SIGNIFICANT CHANGE UP (ref 22–31)
CREAT SERPL-MCNC: 1.3 MG/DL — SIGNIFICANT CHANGE UP (ref 0.5–1.3)
EGFR: 55 ML/MIN/1.73M2 — LOW
EOSINOPHIL # BLD AUTO: 0.01 K/UL — SIGNIFICANT CHANGE UP (ref 0–0.5)
EOSINOPHIL NFR BLD AUTO: 0.1 % — SIGNIFICANT CHANGE UP (ref 0–6)
GLUCOSE SERPL-MCNC: 235 MG/DL — HIGH (ref 70–99)
HCT VFR BLD CALC: 31.6 % — LOW (ref 39–50)
HGB BLD-MCNC: 9.9 G/DL — LOW (ref 13–17)
IMM GRANULOCYTES NFR BLD AUTO: 1 % — HIGH (ref 0–0.9)
LYMPHOCYTES # BLD AUTO: 1.29 K/UL — SIGNIFICANT CHANGE UP (ref 1–3.3)
LYMPHOCYTES # BLD AUTO: 12.1 % — LOW (ref 13–44)
MCHC RBC-ENTMCNC: 28.1 PG — SIGNIFICANT CHANGE UP (ref 27–34)
MCHC RBC-ENTMCNC: 31.3 GM/DL — LOW (ref 32–36)
MCV RBC AUTO: 89.8 FL — SIGNIFICANT CHANGE UP (ref 80–100)
MONOCYTES # BLD AUTO: 1.06 K/UL — HIGH (ref 0–0.9)
MONOCYTES NFR BLD AUTO: 10 % — SIGNIFICANT CHANGE UP (ref 2–14)
NEUTROPHILS # BLD AUTO: 8.11 K/UL — HIGH (ref 1.8–7.4)
NEUTROPHILS NFR BLD AUTO: 76.3 % — SIGNIFICANT CHANGE UP (ref 43–77)
NRBC # BLD: 0 /100 WBCS — SIGNIFICANT CHANGE UP (ref 0–0)
PLATELET # BLD AUTO: 288 K/UL — SIGNIFICANT CHANGE UP (ref 150–400)
POTASSIUM SERPL-MCNC: 4.3 MMOL/L — SIGNIFICANT CHANGE UP (ref 3.5–5.3)
POTASSIUM SERPL-SCNC: 4.3 MMOL/L — SIGNIFICANT CHANGE UP (ref 3.5–5.3)
RBC # BLD: 3.52 M/UL — LOW (ref 4.2–5.8)
RBC # FLD: 13.9 % — SIGNIFICANT CHANGE UP (ref 10.3–14.5)
SODIUM SERPL-SCNC: 137 MMOL/L — SIGNIFICANT CHANGE UP (ref 135–145)
WBC # BLD: 10.63 K/UL — HIGH (ref 3.8–10.5)
WBC # FLD AUTO: 10.63 K/UL — HIGH (ref 3.8–10.5)

## 2023-03-10 PROCEDURE — 99233 SBSQ HOSP IP/OBS HIGH 50: CPT

## 2023-03-10 RX ORDER — MEPOLIZUMAB 100 MG/ML
100 INJECTION, SOLUTION SUBCUTANEOUS
Refills: 0 | Status: DISCONTINUED | OUTPATIENT
Start: 2023-03-10 | End: 2023-03-22

## 2023-03-10 RX ADMIN — TAMSULOSIN HYDROCHLORIDE 0.4 MILLIGRAM(S): 0.4 CAPSULE ORAL at 21:30

## 2023-03-10 RX ADMIN — ATORVASTATIN CALCIUM 20 MILLIGRAM(S): 80 TABLET, FILM COATED ORAL at 21:30

## 2023-03-10 RX ADMIN — MONTELUKAST 10 MILLIGRAM(S): 4 TABLET, CHEWABLE ORAL at 11:36

## 2023-03-10 RX ADMIN — Medication 2: at 08:21

## 2023-03-10 RX ADMIN — CEFEPIME 100 MILLIGRAM(S): 1 INJECTION, POWDER, FOR SOLUTION INTRAMUSCULAR; INTRAVENOUS at 06:47

## 2023-03-10 RX ADMIN — Medication 1 TABLET(S): at 12:30

## 2023-03-10 RX ADMIN — BUDESONIDE AND FORMOTEROL FUMARATE DIHYDRATE 2 PUFF(S): 160; 4.5 AEROSOL RESPIRATORY (INHALATION) at 17:49

## 2023-03-10 RX ADMIN — PANTOPRAZOLE SODIUM 40 MILLIGRAM(S): 20 TABLET, DELAYED RELEASE ORAL at 17:43

## 2023-03-10 RX ADMIN — PANTOPRAZOLE SODIUM 40 MILLIGRAM(S): 20 TABLET, DELAYED RELEASE ORAL at 05:40

## 2023-03-10 RX ADMIN — Medication 2: at 17:40

## 2023-03-10 RX ADMIN — TIOTROPIUM BROMIDE 2 PUFF(S): 18 CAPSULE ORAL; RESPIRATORY (INHALATION) at 07:59

## 2023-03-10 RX ADMIN — Medication 1 TABLET(S): at 11:35

## 2023-03-10 RX ADMIN — Medication 500 MILLIGRAM(S): at 11:35

## 2023-03-10 RX ADMIN — PREGABALIN 1000 MICROGRAM(S): 225 CAPSULE ORAL at 11:46

## 2023-03-10 RX ADMIN — Medication 400 UNIT(S): at 11:36

## 2023-03-10 RX ADMIN — Medication 250 MILLIGRAM(S): at 05:39

## 2023-03-10 RX ADMIN — CEFEPIME 100 MILLIGRAM(S): 1 INJECTION, POWDER, FOR SOLUTION INTRAMUSCULAR; INTRAVENOUS at 17:46

## 2023-03-10 RX ADMIN — Medication 4: at 11:53

## 2023-03-10 RX ADMIN — Medication 50 MILLIGRAM(S): at 05:39

## 2023-03-10 RX ADMIN — INSULIN GLARGINE 15 UNIT(S): 100 INJECTION, SOLUTION SUBCUTANEOUS at 22:24

## 2023-03-10 RX ADMIN — MEPOLIZUMAB 100 MILLIGRAM(S): 100 INJECTION, SOLUTION SUBCUTANEOUS at 20:54

## 2023-03-10 RX ADMIN — Medication 250 MILLIGRAM(S): at 17:45

## 2023-03-10 RX ADMIN — BUDESONIDE AND FORMOTEROL FUMARATE DIHYDRATE 2 PUFF(S): 160; 4.5 AEROSOL RESPIRATORY (INHALATION) at 05:40

## 2023-03-10 NOTE — PROGRESS NOTE ADULT - ASSESSMENT
anemia  GIB    Hgb noted  Monitor cbc  Transfuse prn  PPI for ppx  Defer endoscopic workup for at least 48 hours from last dose  hold a/c  diet as tolerated  patient and wife unsure if they want endoscopic eval  D/w pt and wife  Will follow    I reviewed the overnight course of events on the unit, re-confirming the patient history. I discussed the care with the patient and their family  Differential diagnosis and plan of care discussed with patient after the evaluation  50 minutes spent on total encounter of which more than fifty percent of the encounter was spent counseling and/or coordinating care by the attending physician.  Advanced care planning was discussed with patient and family.  Advanced care planning forms were reviewed and discussed.  Risks, benefits and alternatives of gastroenterologic procedures were discussed in detail and all questions were answered.

## 2023-03-10 NOTE — DISCHARGE NOTE PROVIDER - NSDCHHNEEDSERVICE_GEN_ALL_CORE
Central venous access care/Medication teaching and assessment/Rehabilitation services/Teaching and training

## 2023-03-10 NOTE — DISCHARGE NOTE PROVIDER - NSDCMRMEDTOKEN_GEN_ALL_CORE_FT
apixaban 2.5 mg oral tablet: 1 tab(s) orally every 12 hours  Breo Ellipta 200 mcg-25 mcg/inh inhalation powder: 1 puff(s) inhaled once a day  clopidogrel 75 mg oral tablet: 1 tab(s) orally once a day  CoQ10 100 mg oral capsule: 1 cap(s) orally once a day  Fish Oil oral capsule: 1 cap(s) orally once a day  Incruse Ellipta 62.5 mcg/inh inhalation powder: 1 puff(s) inhaled every 24 hours  Jardiance 25 mg oral tablet: 1 tab(s) orally once a day (in the morning)  metoprolol tartrate 50 mg oral tablet: 1 tab(s) orally every 12 hours  montelukast 10 mg oral tablet: 1 tab(s) orally once a day  NovoLOG 100 units/mL subcutaneous solution: per sliding scale as instructed.  Nucala 100 mg subcutaneous injection: 100 milligram(s) subcutaneous every 4 weeks    *Next dose due 6/3/22*  predniSONE 20 mg oral tablet: 1 tab(s) orally once a day  predniSONE 50 mg oral tablet: 1 tab(s) orally once a day   rosuvastatin 20 mg oral tablet: 1 tab(s) orally once a day (at bedtime)  tamsulosin 0.4 mg oral capsule: 1 cap(s) orally once a day (at bedtime)  Turmeric 500 mg oral capsule: 1 cap(s) orally once a day  Vitamin B Complex 100: 1 tab(s) orally once a day  Vitamin C: 1 tab(s) orally once a day  Vitamin D3: 1 tab(s) orally once a day   albuterol 2.5 mg/3 mL (0.083%) inhalation solution: 3 milliliter(s) inhaled every 6 hours, As Needed  apixaban 2.5 mg oral tablet: 1 tab(s) orally every 12 hours  Breo Ellipta 200 mcg-25 mcg/inh inhalation powder: 1 puff(s) inhaled once a day  clopidogrel 75 mg oral tablet: 1 tab(s) orally once a day  CoQ10 100 mg oral capsule: 1 cap(s) orally once a day  Incruse Ellipta 62.5 mcg/inh inhalation powder: 1 puff(s) inhaled every 24 hours  Jardiance 25 mg oral tablet: 1 tab(s) orally once a day (in the morning)  metoprolol tartrate 50 mg oral tablet: 1 tab(s) orally every 12 hours  montelukast 10 mg oral tablet: 1 tab(s) orally once a day  NovoLOG 100 units/mL subcutaneous solution: sliding scale 3x day before meals     5 units if gluc &gt;200  10 units if gluc &gt;300  15 units if gluc &gt; 400  Nucala 100 mg subcutaneous injection: 100 milligram(s) subcutaneous every 4 weeks    *Last dose 3/10/23  rosuvastatin 20 mg oral tablet: 1 tab(s) orally once a day (at bedtime)  tamsulosin 0.4 mg oral capsule: 1 cap(s) orally once a day (at bedtime)  Turmeric 500 mg oral capsule: 1 cap(s) orally once a day  Vitamin B Complex 100: 1 tab(s) orally once a day  Vitamin C: 1 tab(s) orally once a day  Vitamin D3: 1 tab(s) orally once a day   albuterol 2.5 mg/3 mL (0.083%) inhalation solution: 3 milliliter(s) inhaled every 6 hours, As Needed  apixaban 2.5 mg oral tablet: 1 tab(s) orally every 12 hours  azithromycin 250 mg oral tablet: 1 tablet on M, W, F  Breo Ellipta 200 mcg-25 mcg/inh inhalation powder: 1 puff(s) inhaled once a day  CoQ10 100 mg oral capsule: 1 cap(s) orally once a day  furosemide 20 mg oral tablet: 1 tab(s) orally once a day  Incruse Ellipta 62.5 mcg/inh inhalation powder: 1 puff(s) inhaled every 24 hours  Jardiance 25 mg oral tablet: 1 tab(s) orally once a day (in the morning)  metoprolol tartrate 50 mg oral tablet: 1 tab(s) orally every 12 hours  montelukast 10 mg oral tablet: 1 tab(s) orally once a day  NovoLOG 100 units/mL subcutaneous solution: sliding scale 3x day before meals     5 units if gluc &gt;200  10 units if gluc &gt;300  15 units if gluc &gt; 400  Nucala 100 mg subcutaneous injection: 100 milligram(s) subcutaneous every 4 weeks    *Last dose 3/10/23  rosuvastatin 20 mg oral tablet: 1 tab(s) orally once a day (at bedtime)  tamsulosin 0.4 mg oral capsule: 1 cap(s) orally once a day (at bedtime)  Turmeric 500 mg oral capsule: 1 cap(s) orally once a day  Vitamin B Complex 100: 1 tab(s) orally once a day  Vitamin C: 1 tab(s) orally once a day  Vitamin D3: 1 tab(s) orally once a day   albuterol 2.5 mg/3 mL (0.083%) inhalation solution: 3 milliliter(s) inhaled every 6 hours, As Needed  apixaban 2.5 mg oral tablet: 1 tab(s) orally every 12 hours  azithromycin 250 mg oral tablet: 1 tablet on M, W, F    Until started on cipro  Breo Ellipta 200 mcg-25 mcg/inh inhalation powder: 1 puff(s) inhaled once a day  Carafate 1 g oral tablet: 1 tab(s) orally 4 times a day  cefepime 2 g intravenous injection: 2 gram(s) intravenous every 12 hours  CoQ10 100 mg oral capsule: 1 cap(s) orally once a day  furosemide 20 mg oral tablet: 1 tab(s) orally once a day  Incruse Ellipta 62.5 mcg/inh inhalation powder: 1 puff(s) inhaled every 24 hours  Jardiance 25 mg oral tablet: 1 tab(s) orally once a day (in the morning)  Lantus Solostar Pen 100 units/mL subcutaneous solution: 10 unit(s) subcutaneous once a day (at bedtime)   metoprolol tartrate 50 mg oral tablet: 1 tab(s) orally every 12 hours  montelukast 10 mg oral tablet: 1 tab(s) orally once a day  NovoLOG 100 units/mL subcutaneous solution: sliding scale 3x day before meals     5 units if gluc &gt;200  10 units if gluc &gt;300  15 units if gluc &gt; 400  Nucala 100 mg subcutaneous injection: 100 milligram(s) subcutaneous every 4 weeks    *Last dose 3/10/23  Protonix 40 mg oral delayed release tablet: 1 tab(s) orally every 12 hours  rosuvastatin 20 mg oral tablet: 1 tab(s) orally once a day (at bedtime)  tamsulosin 0.4 mg oral capsule: 1 cap(s) orally once a day (at bedtime)  Turmeric 500 mg oral capsule: 1 cap(s) orally once a day  Vitamin B Complex 100: 1 tab(s) orally once a day  Vitamin C: 1 tab(s) orally once a day  Vitamin D3: 1 tab(s) orally once a day   albuterol 2.5 mg/3 mL (0.083%) inhalation solution: 3 milliliter(s) inhaled every 6 hours, As Needed  apixaban 2.5 mg oral tablet: 1 tab(s) orally every 12 hours  azithromycin 250 mg oral tablet: 1 tablet on M, W, F    Until started on cipro  Breo Ellipta 200 mcg-25 mcg/inh inhalation powder: 1 puff(s) inhaled once a day  Carafate 1 g oral tablet: 1 tab(s) orally 4 times a day  cefepime 2 g intravenous injection: 2 gram(s) intravenous every 12 hours  CoQ10 100 mg oral capsule: 1 cap(s) orally once a day  furosemide 20 mg oral tablet: 1 tab(s) orally once a day  Incruse Ellipta 62.5 mcg/inh inhalation powder: 1 puff(s) inhaled every 24 hours  Jardiance 25 mg oral tablet: 1 tab(s) orally once a day (in the morning)  Lantus Solostar Pen 100 units/mL subcutaneous solution: 10 unit(s) subcutaneous once a day (at bedtime)   metoprolol tartrate 50 mg oral tablet: 1 tab(s) orally every 12 hours  montelukast 10 mg oral tablet: 1 tab(s) orally once a day  NovoLOG 100 units/mL subcutaneous solution: sliding scale 3x day before meals     2 units for glucose 151-200  4 units for glucose 201-250  6 units for glucose 251-300  8 units for glucose 301-350  10 units for glucose 351-400  call MD for glucose above 400  Nucala 100 mg subcutaneous injection: 100 milligram(s) subcutaneous every 4 weeks    *Last dose 3/10/23  Protonix 40 mg oral delayed release tablet: 1 tab(s) orally every 12 hours  rosuvastatin 20 mg oral tablet: 1 tab(s) orally once a day (at bedtime)  tamsulosin 0.4 mg oral capsule: 1 cap(s) orally once a day (at bedtime)  Turmeric 500 mg oral capsule: 1 cap(s) orally once a day  Vitamin B Complex 100: 1 tab(s) orally once a day  Vitamin C: 1 tab(s) orally once a day  Vitamin D3: 1 tab(s) orally once a day

## 2023-03-10 NOTE — DISCHARGE NOTE PROVIDER - CARE PROVIDER_API CALL
Austin Smith  Phone: (   )    -  Fax: (   )    -  Established Patient  Follow Up Time: 2 weeks   Austin Smith  Phone: (   )    -  Fax: (   )    -  Established Patient  Follow Up Time: 2 weeks    Oscar Marcelino)  Critical Care Medicine; Pulmonary Disease  [address] [place] [id number]  Phone: [phone]  Fax: [phone]  Established Patient  Follow Up Time:    Oscar Marcelino)  Critical Care Medicine; Pulmonary Disease  [address] [facility], [address]  [place], [place] [id number]  Phone: [phone]  Fax: [phone]  Established Patient  Follow Up Time: 1 month    Austin Smith  Phone: (   )    -  Fax: (   )    -  Established Patient  Follow Up Time: 2 weeks    Brandi العلي)  Infectious Disease; Internal Medicine  [address] [place] [address]  Phone: [phone]  Fax: [phone]  Follow Up Time: 1 month

## 2023-03-10 NOTE — PROGRESS NOTE ADULT - SUBJECTIVE AND OBJECTIVE BOX
[facility]  Rich Sky PA-C  [address] [place] [address]  [phone]      INTERVAL HPI/OVERNIGHT EVENTS:  Pt s/e with wife at bedside  Reports no BM and no overt GI bleeding  Otherwise no GI complaints    MEDICATIONS  (STANDING):  ascorbic acid 500 milliGRAM(s) Oral daily  atorvastatin 20 milliGRAM(s) Oral at bedtime  budesonide 160 MICROgram(s)/formoterol 4.5 MICROgram(s) Inhaler 2 Puff(s) Inhalation two times a day  cefepime   IVPB      cefepime   IVPB 2000 milliGRAM(s) IV Intermittent every 12 hours  cholecalciferol 400 Unit(s) Oral daily  cyanocobalamin 1000 MICROGram(s) Oral daily  dextrose 5%. 1000 milliLiter(s) (50 mL/Hr) IV Continuous <Continuous>  dextrose 5%. 1000 milliLiter(s) (100 mL/Hr) IV Continuous <Continuous>  dextrose 50% Injectable 25 Gram(s) IV Push once  dextrose 50% Injectable 12.5 Gram(s) IV Push once  dextrose 50% Injectable 25 Gram(s) IV Push once  glucagon  Injectable 1 milliGRAM(s) IntraMuscular once  insulin glargine Injectable (LANTUS) 15 Unit(s) SubCutaneous at bedtime  insulin lispro (ADMELOG) corrective regimen sliding scale   SubCutaneous three times a day before meals  insulin lispro (ADMELOG) corrective regimen sliding scale   SubCutaneous at bedtime  metoprolol tartrate 50 milliGRAM(s) Oral two times a day  montelukast 10 milliGRAM(s) Oral daily  multivitamin 1 Tablet(s) Oral daily  Nephro-carlos 1 Tablet(s) Oral daily  pantoprazole  Injectable 40 milliGRAM(s) IV Push every 12 hours  saccharomyces boulardii 250 milliGRAM(s) Oral two times a day  tamsulosin 0.4 milliGRAM(s) Oral at bedtime  tiotropium 2.5 MICROgram(s) Inhaler 2 Puff(s) Inhalation daily    MEDICATIONS  (PRN):  albuterol/ipratropium for Nebulization 3 milliLiter(s) Nebulizer every 6 hours PRN Shortness of Breath and/or Wheezing  dextrose Oral Gel 15 Gram(s) Oral once PRN Blood Glucose LESS THAN 70 milliGRAM(s)/deciliter      Allergies    [This allergen will not trigger allergy alert] IV DYE, IODINE CONTRAST (Unknown)  [This allergen will not trigger allergy alert] NKDA (Unknown)  latex (Unknown)    Intolerances    shellfish (Nausea)      PHYSICAL EXAM:   Vital Signs:  Vital Signs Last 24 Hrs  T(C): 36.4 (10 Mar 2023 12:15), Max: 36.9 (09 Mar 2023 20:35)  T(F): 97.6 (10 Mar 2023 12:15), Max: 98.4 (09 Mar 2023 20:35)  HR: 98 (10 Mar 2023 12:15) (93 - 112)  BP: 107/65 (10 Mar 2023 12:15) (92/66 - 113/70)  BP(mean): --  RR: 18 (10 Mar 2023 12:15) (16 - 19)  SpO2: 95% (10 Mar 2023 12:15) (93% - 99%)    Parameters below as of 10 Mar 2023 12:15  Patient On (Oxygen Delivery Method): room air      GENERAL:  Appears stated age  HEENT:  NC/AT  CHEST:  Full & symmetric excursion  HEART:  Regular rhythm  ABDOMEN:  Soft, non-tender, non-distended  EXTEREMITIES:  no cyanosis  SKIN:  No rash  NEURO:  Alert      LABS:                        9.9    10.63 )-----------( 288      ( 10 Mar 2023 05:00 )             31.6     03-10    137  |  108  |  25<H>  ----------------------------<  235<H>  4.3   |  26  |  1.30    Ca    8.7      10 Mar 2023 05:00  Mg     2.0     03-09    I spent 120 minutes face to face with the patient. Time was spent in discussion with patient and wife at bedside. Discussed imaging results, lab results, episodes of BRBPR/melena, history of prior colonoscopy, possible etiologies of bleeding, treatment plan, answered all pt and wife's questions. Visit start time: 8:00. Visit end time: 10:00.

## 2023-03-10 NOTE — DISCHARGE NOTE PROVIDER - NSDCCPCAREPLAN_GEN_ALL_CORE_FT
PRINCIPAL DISCHARGE DIAGNOSIS  Diagnosis: Chronic osteomyelitis  Assessment and Plan of Treatment: You were admitted with chronic osteomyelitis with drainage in the left thigh. You were treated with cefepime as the antibiotics and will need it for long term. You received a PICC line to continue your IV antibiotics at home.  Please continue to take IV cefepime 2g every 12 hours until _____ for a total of 6 weeks course.  Afterwards, please being to take ciprofloxacin indefinitely for long term management.      SECONDARY DISCHARGE DIAGNOSES  Diagnosis: Acute-on-chronic respiratory failure  Assessment and Plan of Treatment: Your hospital was complicated with respiratory failure, in which you were admitted to the ICU for further management. Your symptoms improved with continuation of lasix and respiratory management with inhalers and nebulizers. You were also started on BIPAP.  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    Diagnosis: GI bleed  Assessment and Plan of Treatment: You were found to have acute GI bleed during this hospital course requiring multiple units of blood transfusion. 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 This was closed during the colonoscopy procedure. Subsequently, your GI bleeding stopped and your anemia improved.  Please see your gastroenterologist for regular follow up.    Diagnosis: DM (diabetes mellitus), type 2  Assessment and Plan of Treatment: You have a chronic history of diabetes which was closely monitored during your hospital course.  Please begin to take baseline lantus 14 units at night and closely monitor your blood sugar levels every morning, before your meals three times a day, and also at night before bedtime.  If the sugar levels in the morning before breakfast is 120-150; please decrease the nighttime lantus to 11 units for that day. If the sugar level is 100-120, please decrease the nighttime lantus to 7 units for that day. If the sugar level is less than <70, please do not give yourself any nighttime lantus for that day.  Please continue to give yourself insulin during the day time as needed before meals.    Diagnosis: History of atrial paroxysmal tachycardia  Assessment and Plan of Treatment: You have a known history of fast heart rate at times.  Please start taking your home Eliquis on 3/24/2023 if you do not notice anymore bloody stools.  Please continue to take your home metoprolol as previously instructed and see your primary care physician for regular follow up.    Diagnosis: CAD (coronary artery disease)  Assessment and Plan of Treatment: You have a known history of CAD.  Please start taking your home plavix on 3/24/2023 if you do not notice anymore bloody stools.  Shaan ford to take your home statin and follow up with your cardiologist for regular follow up.     PRINCIPAL DISCHARGE DIAGNOSIS  Diagnosis: Chronic osteomyelitis  Assessment and Plan of Treatment: You were admitted with chronic osteomyelitis with drainage in the left thigh. You were treated with cefepime (antibiotics) and will need to continue it at home. You received a PICC line to continue your IV antibiotics at home.  Please continue to take IV Cefepime 2g every 12 hours until 4/16/23 for a total of 6 weeks course.  Please continue to take your Azithromycin on Monday, Wednesday, and Friday as previously scheduled until 4/16/23 when you are transitioned to ciprofloxacin.  Afterwards, please being to take ciprofloxacin indefinitely for long term management.  Please follow up with infectious disease Dr. العلي in 1 month for further management.      SECONDARY DISCHARGE DIAGNOSES  Diagnosis: Acute-on-chronic respiratory failure  Assessment and Plan of Treatment: Your hospital was complicated with respiratory failure, in which you were admitted to the ICU for further management. Your symptoms improved with continuation of lasix and respiratory management with inhalers and nebulizers. You were also started on BIPAP at night.  Please continue to take your home medications as previously instructed and see your pulmonologist Dr. Marcelino in 1 month for regular follow up for your chronic eosinophilic asthma/COPD.    Diagnosis: GI bleed  Assessment and Plan of Treatment: You were found to have acute GI bleed during this hospital course requiring multiple units of blood transfusion. 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 This was closed during the colonoscopy procedure. Subsequently, your GI bleeding stopped and your anemia improved.  Please see your gastroenterologist for regular follow up.  Please monitor for any more bloody stools. If there are - please go to the emergency room for further workup.    Diagnosis: DM (diabetes mellitus), type 2  Assessment and Plan of Treatment: You have a chronic history of diabetes which was closely monitored during your hospital course.  Please continue your home Jardiance medication as previously instructed.  Please begin to take lantus insulin 10 units at night and closely monitor your blood sugar levels every morning, before your meals three times a day, and also at night before bedtime.  If the sugar levels in the morning before breakfast is below <70; please do NOT give yourself any nighttime lantus for that day and eat something with sugar immediately.   If the sugar levels in the morning is , please decrease the nighttime lantus to 6 units for that day.   If the sugar levels in the morning is 100-120, please decrease the nighttime lantus to 8 units for that day.   Please continue to give yourself insulin during the day time as needed before meals.    Diagnosis: Gastric ulcer  Assessment and Plan of Treatment: You were found to have gastritis and gastric ulcers in your stomach on your endoscopy.  Please begin to take sulcrafate 4 times a day and pantoprazole every 12 hours (twice a day).  Please see your gastroenterologist for regular follow up.      Diagnosis: History of atrial paroxysmal tachycardia  Assessment and Plan of Treatment: You have a known history of atrial fibrillation or fast heart rates.  Please restart taking your home Eliquis on 3/24/2023 if you do not notice anymore bloody stools.  Please continue to take your home metoprolol as previously instructed and see your primary care physician for regular follow up.    Diagnosis: CAD (coronary artery disease)  Assessment and Plan of Treatment: You have a known history of CAD.  Please start taking your home Plavix (clopidogrel) on 3/24/2023 if you do not notice anymore bloody stools.  Pleaes continue to take your home statin and follow up with your cardiologist for regular follow up.     PRINCIPAL DISCHARGE DIAGNOSIS  Diagnosis: Chronic osteomyelitis  Assessment and Plan of Treatment: You were admitted with chronic osteomyelitis with drainage in the left thigh. You were treated with cefepime (antibiotics) and will need to continue it at home. You received a PICC line to continue your IV antibiotics at home.  Please continue to take IV Cefepime 2g every 12 hours until 4/16/23 for a total of 6 weeks course.  Please continue to take your Azithromycin on Monday, Wednesday, and Friday as previously scheduled until 4/16/23 when you are transitioned to ciprofloxacin.  Afterwards, please begin to take ciprofloxacin indefinitely for long term management.  Please follow up with infectious disease Dr. العلي in 1 month for further management.      SECONDARY DISCHARGE DIAGNOSES  Diagnosis: Acute-on-chronic respiratory failure  Assessment and Plan of Treatment: Your hospital was complicated with respiratory failure, in which you were admitted to the ICU for further management. Your symptoms improved with continuation of lasix and respiratory management with inhalers and nebulizers. You were also started on BIPAP at night.  Please continue to take your home medications as previously instructed and see your pulmonologist Dr. Marcelino in 1 month for regular follow up for your chronic eosinophilic asthma/COPD.    Diagnosis: GI bleed  Assessment and Plan of Treatment: You were found to have acute GI bleed during this hospital course requiring multiple units of blood transfusion. 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 This was closed during the colonoscopy procedure. Subsequently, your GI bleeding stopped and your anemia improved. You can restart your Eliquis and baby aspirin on 3/24.  Please see your gastroenterologist for regular follow up.  Please monitor for any more bloody stools. If there are - please go to the emergency room for further workup.    Diagnosis: DM (diabetes mellitus), type 2  Assessment and Plan of Treatment: You have a chronic history of diabetes which was closely monitored during your hospital course.  Please continue your home Jardiance medication as previously instructed.  Please begin to take lantus insulin 10 units at night and closely monitor your blood sugar levels every morning, before your meals three times a day, and also at night before bedtime.  If the sugar levels in the morning before breakfast is below <70; please do NOT give yourself any nighttime lantus for that day and eat something with sugar immediately.   If the sugar levels in the morning is , please decrease the nighttime lantus to 6 units for that day.   If the sugar levels in the morning is 100-120, please decrease the nighttime lantus to 8 units for that day.   Please continue to give yourself insulin during the day time as needed before meals.    Diagnosis: CAD (coronary artery disease)  Assessment and Plan of Treatment: You have a known history of CAD.  Please start taking your home Plavix (clopidogrel) on 3/24/2023 if you do not notice anymore bloody stools.  Pleaes continue to take your home statin and follow up with your cardiologist for regular follow up.    Diagnosis: History of atrial paroxysmal tachycardia  Assessment and Plan of Treatment: You have a known history of atrial fibrillation or fast heart rates.  Please restart taking your home Eliquis on 3/24/2023 if you do not notice anymore bloody stools.  Please continue to take your home metoprolol as previously instructed and see your primary care physician for regular follow up.    Diagnosis: Gastric ulcer  Assessment and Plan of Treatment: You were found to have gastritis and gastric ulcers in your stomach on your endoscopy.  Please begin to take sulcrafate 4 times a day and pantoprazole every 12 hours (twice a day).  Please see your gastroenterologist for regular follow up.

## 2023-03-10 NOTE — DISCHARGE NOTE PROVIDER - HOSPITAL COURSE
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   Patient follow with ID dr العلي for mgt of his chronic femoral osteomyelitis . Has abscess and drainage in November 2022. Present with one day of new yellowish  drainage from left thigh. No fever or chills or other constitutional symptoms.   In ED patient with normal WBC and lactate 1.4.  CXR with no active chest disease.  Left femur X-ray with no significant change in mid femoral sclerotic widening likely representing combination of healed fracture and chronic osteomyelitis.      Pt. was was admitted with ID and pulmonary consult.  He was given IV antibiotics.  Blood cultures and urine culture showed no growth.  Abscess culture with moderate coag negative staph.  MRI of left femur showed Chronic osteomyelitis with deformity of the mid to distal femur with increase in the osseous marrow edema and loss of normal T1 fatty marrow adjacent to the defect concerning for acute on chronic osteomyelitis. Increase in size in the soft tissue abscess centered deep to the vastus intermedius measuring up to 22cm CC.  Pt. scheduled for drain placement by interventional radiology.  On 3/9 patient had blood BMs with subsequent hypotension.  He was given NS bolus and his hemoglobin was monitored.  GI was consulted and patient started on protonix twice a day.  He received 2 units PRBC transfusion was hemoglobin of 7.9 and hemoglobin improved to 9.9.       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   Patient follow with ID dr العلي for mgt of his chronic femoral osteomyelitis . Has abscess and drainage in November 2022. Present with one day of new yellowish  drainage from left thigh. No fever or chills or other constitutional symptoms.   In ED patient with normal WBC and lactate 1.4.  CXR with no active chest disease.  Left femur X-ray with no significant change in mid femoral sclerotic widening likely representing combination of healed fracture and chronic osteomyelitis.      Pt. was was admitted with ID and pulmonary consult.  He was given IV antibiotics.  Blood cultures and urine culture showed no growth.  Abscess culture with moderate coag negative staph.  MRI of left femur showed Chronic osteomyelitis with deformity of the mid to distal femur with increase in the osseous marrow edema and loss of normal T1 fatty marrow adjacent to the defect concerning for acute on chronic osteomyelitis. Increase in size in the soft tissue abscess centered deep to the vastus intermedius measuring up to 22cm CC.  Pt. scheduled for drain placement by interventional radiology.  On 3/9 patient had blood BMs with subsequent hypotension.  He was given NS bolus and his hemoglobin was monitored.  GI was consulted and patient started on protonix twice a day.  He received 2 units PRBC transfusion was hemoglobin of 7.9 and hemoglobin improved to 9.9.  On 3/11 patient again had GI bleed with hemoglobin down to 7.9.  Pt. received additional 2 units PRBC transfusjon. 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   Patient follow with ID dr العلي for mgt of his chronic femoral osteomyelitis . Has abscess and drainage in November 2022. Present with one day of new yellowish  drainage from left thigh. No fever or chills or other constitutional symptoms.  (06 Mar 2023 20:35)    In ED patient with normal WBC and lactate 1.4.  CXR with no active chest disease.  Left femur X-ray with no significant change in mid femoral sclerotic widening likely representing combination of healed fracture and chronic osteomyelitis.      ---  HOSPITAL COURSE:   Pt. was admitted with ID and pulmonary consult.  He was given IV antibiotics. Blood cultures and urine culture showed no growth. Abscess culture with moderate coag negative staph. MRI of left femur showed chronic osteomyelitis with deformity of the mid to distal femur with increase in the osseous marrow edema and loss of normal T1 fatty marrow adjacent to the defect concerning for acute on chronic osteomyelitis. Increase in size in the soft tissue abscess centered deep to the vastus intermedius measuring up to 22cm CC. On 3/9 patient had bloody BMs with subsequent hypotension. He was given NS bolus and his hemoglobin was monitored. GI was consulted and patient started on protonix twice a day.  He received 2 units PRBC transfusion was hemoglobin of 7.9 and hemoglobin improved to 9.9.  On 3/11 patient again had GI bleed with hemoglobin down to 7.9. Pt. received additional 2 units PRBC transfusion. Pt continued to have episodes of GIB throughout the hospital course requiring an additional unit of PRBC on both 3/15 and 3/16. 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 Hemoglobin and anemia remained stable afterwards with no further episodes of bloody BMs.    Hospital course complicated by respiratory distress on 3/19, where a rapid response was called likely from a choking episode vs possible mild aspiration. CXR showed no acute new findings of aspiration. Was admitted to the ICU. Started on Bipap at night and continued on his respiratory regimen of spiriva, symbicort, montelukast, and duonebs. Respiratory symptoms improved. Pt. was able to receive a PICC line with interventional radiology on 3/22 and will be continued on cefepime for a total of 6 week course duration for chronic osteomyelitis. Pt was also recommended by ID to continue on cipro indefinitely after completing the course of cefepime. In the ICU, pt was also evaluated by [facility] and got an MBS study done, which showed ______.    Patient's medical condition improved throughout hospital course and is medically stable for discharge home with close outpatient follow up. Patient seen and examined on day of discharge.     ---  PATIENT CONDITION:  - stable    ---  PHYSICAL EXAM (On Date of Discharge):       ---  CONSULTANTS:   GI - Dr. Otto  ICU - Dr. Sanchez, Dr. Marcelino  Cardio - [facility]  Pulm - Dr. Combs  ID - Dr. العلي    ---  TIME SPENT:  I, the attending physician, was physically present for the key portions of the evaluation and management (E/M) service provided. The total amount of time spent reviewing the hospital notes, laboratory values, imaging findings, assessing/counseling the patient, discussing with consultant physicians, social work, nursing staff was -- minutes    ---  Primary care provider was made aware of plan for discharge:  [  ] NO     [  ] YES   (FROM ADMISSION H+P)  HPI:  82 yr male with history of Hypertension, COPD home oxygen dependent, CAD CABG , chronic left femor osteomyelitis since traumatic left femoral fracture in 1966.   Patient follow with ID dr العلي for mgt of his chronic femoral osteomyelitis . Has abscess and drainage in November 2022. Present with one day of new yellowish  drainage from left thigh. No fever or chills or other constitutional symptoms.  (06 Mar 2023 20:35)    In ED patient with normal WBC and lactate 1.4.  CXR with no active chest disease.  Left femur X-ray with no significant change in mid femoral sclerotic widening likely representing combination of healed fracture and chronic osteomyelitis.      ---  HOSPITAL COURSE:   Pt. was admitted with ID and pulmonary consult.  He was given IV antibiotics. Blood cultures and urine culture showed no growth. Abscess culture with moderate coag negative staph. MRI of left femur showed chronic osteomyelitis with deformity of the mid to distal femur with increase in the osseous marrow edema and loss of normal T1 fatty marrow adjacent to the defect concerning for acute on chronic osteomyelitis. Increase in size in the soft tissue abscess centered deep to the vastus intermedius measuring up to 22cm CC. On 3/9 patient had bloody BMs with subsequent hypotension. He was given NS bolus and his hemoglobin was monitored. GI was consulted and patient started on protonix twice a day.  He received 2 units PRBC transfusion was hemoglobin of 7.9 and hemoglobin improved to 9.9.  On 3/11 patient again had GI bleed with hemoglobin down to 7.9. Pt. received additional 2 units PRBC transfusion. Pt continued to have episodes of GIB throughout the hospital course requiring an additional unit of PRBC on both 3/15 and 3/16. 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 Hemoglobin and anemia remained stable afterwards with no further episodes of bloody BMs.    Hospital course complicated by respiratory distress on 3/19, where a rapid response was called likely from a choking episode vs possible mild aspiration. CXR showed no acute new findings of aspiration. Was admitted to the ICU. Started on Bipap at night and continued on his respiratory regimen of spiriva, symbicort, montelukast, and duonebs. Respiratory symptoms improved. Pt. was able to receive a PICC line with interventional radiology on 3/22 and will be continued on cefepime for a total of 6 week course duration for chronic osteomyelitis. Pt was also recommended by ID to continue on cipro indefinitely after completing the course of cefepime. In the ICU, pt was also evaluated by [facility] and got an MBS study done, recommended soft and bite size with thin liquids.    Patient's medical condition improved throughout hospital course and is medically stable for discharge home with close outpatient follow up. Patient seen and examined on day of discharge.     ---  PATIENT CONDITION:  - stable    ---  PHYSICAL EXAM (On Date of Discharge):   Vital Signs Last 24 Hrs  T(C): 36.7 (22 Mar 2023 08:20), Max: 36.7 (22 Mar 2023 04:40)  T(F): 98 (22 Mar 2023 08:20), Max: 98.1 (22 Mar 2023 04:40)  HR: 67 (22 Mar 2023 08:00) (64 - 94)  BP: 102/57 (22 Mar 2023 08:00) (93/50 - 133/63)  BP(mean): 72 (22 Mar 2023 08:00) (67 - 91)  RR: 20 (22 Mar 2023 08:00) (14 - 27)  SpO2: 99% (22 Mar 2023 08:00) (93% - 100%)    Parameters below as of 22 Mar 2023 08:00  Patient On (Oxygen Delivery Method): room air    Gen: Lying in bed comfortably. NAD.  Neuro: Answers questions appropriately. Follows command.  HEENT: EOMI. Conjunctiva and sclera clear.  Neck: Supple. No JVD.  Resp: CTA b/l. No crackles, wheezing, or rhonchi.  CVS: +S1, S2. RRR. No murmurs, rubs, or gallops.  Abd: Soft, nontender, nondistended. +BS.  Ext: No clubbing, cyanosis. No calf tenderness. No edema. L thigh wound drainage with dressings c/d/i.  Skin: Warm and perfused.    ---  CONSULTANTS:   GI - Dr. Otto  ICU - Dr. Sanchez, Dr. Marcelino  Cardio - [facility]  Pulm - Dr. Combs  ID - Dr. العلي    ---  TIME SPENT:  I, the attending physician, was physically present for the key portions of the evaluation and management (E/M) service provided. The total amount of time spent reviewing the hospital notes, laboratory values, imaging findings, assessing/counseling the patient, discussing with consultant physicians, social work, nursing staff was -- minutes    ---  Primary care provider was made aware of plan for discharge:  [  ] NO     [  ] YES   (FROM ADMISSION H+P)  HPI:  82 yr male with history of Hypertension, COPD home oxygen dependent, CAD CABG , chronic left femor osteomyelitis since traumatic left femoral fracture in 1966.   Patient follow with ID dr العلي for mgt of his chronic femoral osteomyelitis . Has abscess and drainage in November 2022. Present with one day of new yellowish  drainage from left thigh. No fever or chills or other constitutional symptoms.  (06 Mar 2023 20:35)    In ED patient with normal WBC and lactate 1.4.  CXR with no active chest disease.  Left femur X-ray with no significant change in mid femoral sclerotic widening likely representing combination of healed fracture and chronic osteomyelitis.      ---  HOSPITAL COURSE:   Pt. was admitted with ID and pulmonary consult.  He was given IV antibiotics. Blood cultures and urine culture showed no growth. Abscess culture with moderate coag negative staph. MRI of left femur showed chronic osteomyelitis with deformity of the mid to distal femur with increase in the osseous marrow edema and loss of normal T1 fatty marrow adjacent to the defect concerning for acute on chronic osteomyelitis. Increase in size in the soft tissue abscess centered deep to the vastus intermedius measuring up to 22cm CC. On 3/9 patient had bloody BMs with subsequent hypotension. He was given NS bolus and his hemoglobin was monitored. GI was consulted and patient started on protonix twice a day.  He received 2 units PRBC transfusion was hemoglobin of 7.9 and hemoglobin improved to 9.9.  On 3/11 patient again had GI bleed with hemoglobin down to 7.9. Pt. received additional 2 units PRBC transfusion. Pt continued to have episodes of GIB throughout the hospital course requiring an additional unit of PRBC on both 3/15 and 3/16. 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 Hemoglobin and anemia remained stable afterwards with no further episodes of bloody BMs.    Hospital course complicated by respiratory distress on 3/19, where a rapid response was called likely from a choking episode vs possible mild aspiration. CXR showed no acute new findings of aspiration. Was admitted to the ICU. Started on Bipap at night and continued on his respiratory regimen of spiriva, symbicort, montelukast, and duonebs. Respiratory symptoms improved. Pt. was able to receive a PICC line with interventional radiology on 3/22 and will be continued on cefepime for a total of 6 week course duration until 4/16 for chronic osteomyelitis. Pt was also recommended by ID to continue on cipro indefinitely after completing the course of cefepime. In the ICU, pt was also evaluated by [facility] and got an MBS study done, recommended soft and bite size with thin liquids.    Patient's medical condition improved throughout hospital course and is medically stable for discharge home with close outpatient follow up. Patient seen and examined on day of discharge.     ---  PATIENT CONDITION:  - stable    ---  PHYSICAL EXAM (On Date of Discharge):   Vital Signs Last 24 Hrs  T(C): 36.7 (22 Mar 2023 08:20), Max: 36.7 (22 Mar 2023 04:40)  T(F): 98 (22 Mar 2023 08:20), Max: 98.1 (22 Mar 2023 04:40)  HR: 67 (22 Mar 2023 08:00) (64 - 94)  BP: 102/57 (22 Mar 2023 08:00) (93/50 - 133/63)  BP(mean): 72 (22 Mar 2023 08:00) (67 - 91)  RR: 20 (22 Mar 2023 08:00) (14 - 27)  SpO2: 99% (22 Mar 2023 08:00) (93% - 100%)    Parameters below as of 22 Mar 2023 08:00  Patient On (Oxygen Delivery Method): room air    Gen: Lying in bed comfortably. NAD.  Neuro: Answers questions appropriately. Follows command.  HEENT: EOMI. Conjunctiva and sclera clear.  Neck: Supple. No JVD.  Resp: CTA b/l. No crackles, wheezing, or rhonchi.  CVS: +S1, S2. RRR. No murmurs, rubs, or gallops.  Abd: Soft, nontender, nondistended. +BS.  Ext: No clubbing, cyanosis. No calf tenderness. No edema. L thigh wound drainage with dressings c/d/i.  Skin: Warm and perfused.    ---  CONSULTANTS:   GI - Dr. Otto  ICU - Dr. Sanchez, Dr. Marcelino  Cardio - [facility]  Pulm - Dr. Combs  ID - Dr. العلي    ---  TIME SPENT:  I, the attending physician, was physically present for the key portions of the evaluation and management (E/M) service provided. The total amount of time spent reviewing the hospital notes, laboratory values, imaging findings, assessing/counseling the patient, discussing with consultant physicians, social work, nursing staff was -- minutes (FROM ADMISSION H+P)  HPI:  82 yr male with history of Hypertension, COPD home oxygen dependent, CAD CABG , chronic left femor osteomyelitis since traumatic left femoral fracture in 1966.   Patient follow with ID dr العلي for mgt of his chronic femoral osteomyelitis . Has abscess and drainage in November 2022. Present with one day of new yellowish  drainage from left thigh. No fever or chills or other constitutional symptoms.  (06 Mar 2023 20:35)    In ED patient with normal WBC and lactate 1.4.  CXR with no active chest disease.  Left femur X-ray with no significant change in mid femoral sclerotic widening likely representing combination of healed fracture and chronic osteomyelitis.      ---  HOSPITAL COURSE:   Pt. was admitted with ID and pulmonary consult.  He was given IV antibiotics. Blood cultures and urine culture showed no growth. Abscess culture with moderate coag negative staph. MRI of left femur showed chronic osteomyelitis with deformity of the mid to distal femur with increase in the osseous marrow edema and loss of normal T1 fatty marrow adjacent to the defect concerning for acute on chronic osteomyelitis. Increase in size in the soft tissue abscess centered deep to the vastus intermedius measuring up to 22cm CC. On 3/9 patient had bloody BMs with subsequent hypotension. He was given NS bolus and his hemoglobin was monitored. GI was consulted and patient started on protonix twice a day.  He received 2 units PRBC transfusion was hemoglobin of 7.9 and hemoglobin improved to 9.9.  On 3/11 patient again had GI bleed with hemoglobin down to 7.9. Pt. received additional 2 units PRBC transfusion. Pt continued to have episodes of GIB throughout the hospital course requiring an additional unit of PRBC on both 3/15 and 3/16. 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 Hemoglobin and anemia remained stable afterwards with no further episodes of bloody BMs.    Hospital course complicated by respiratory distress on 3/19, where a rapid response was called likely from a choking episode vs possible mild aspiration. CXR showed no acute new findings of aspiration. Was admitted to the ICU. Started on Bipap at night and continued on his respiratory regimen of spiriva, symbicort, montelukast, and duonebs. Respiratory symptoms improved. Pt. was able to receive a PICC line with interventional radiology on 3/22 and will be continued on cefepime for a total of 6 week course duration until 4/16 for chronic osteomyelitis. Pt was also recommended by ID to continue on cipro indefinitely after completing the course of cefepime. In the ICU, pt was also evaluated by [facility] and got an MBS study done, recommended soft and bite size with thin liquids.    Patient's medical condition improved throughout hospital course and is medically stable for discharge home with close outpatient follow up. Patient seen and examined on day of discharge.     ---  PATIENT CONDITION:  - stable    ---  PHYSICAL EXAM (On Date of Discharge):   Vital Signs Last 24 Hrs  T(C): 36.7 (22 Mar 2023 08:20), Max: 36.7 (22 Mar 2023 04:40)  T(F): 98 (22 Mar 2023 08:20), Max: 98.1 (22 Mar 2023 04:40)  HR: 67 (22 Mar 2023 08:00) (64 - 94)  BP: 102/57 (22 Mar 2023 08:00) (93/50 - 133/63)  BP(mean): 72 (22 Mar 2023 08:00) (67 - 91)  RR: 20 (22 Mar 2023 08:00) (14 - 27)  SpO2: 99% (22 Mar 2023 08:00) (93% - 100%)    Parameters below as of 22 Mar 2023 08:00  Patient On (Oxygen Delivery Method): room air    Gen: Lying in bed comfortably. NAD.  Neuro: Answers questions appropriately. Follows command.  HEENT: EOMI. Conjunctiva and sclera clear.  Neck: Supple. No JVD.  Resp: CTA b/l. No crackles, wheezing, or rhonchi.  CVS: +S1, S2. RRR. No murmurs, rubs, or gallops.  Abd: Soft, nontender, nondistended. +BS.  Ext: No clubbing, cyanosis. No calf tenderness. No edema. L thigh wound drainage with dressings c/d/i.  Skin: Warm and perfused.    ---  CONSULTANTS:   GI - Dr. Otto  ICU - Dr. Sanhcez, Dr. Marcelino  Cardio - [facility]  Pulm - Dr. Combs  ID - Dr. العلي    ---  TIME SPENT: 35  I, the attending physician, was physically present for the key portions of the evaluation and management (E/M) service provided. The total amount of time spent reviewing the hospital notes, laboratory values, imaging findings, assessing/counseling the patient, discussing with consultant physicians, social work, nursing staff was 30 minutes

## 2023-03-10 NOTE — DISCHARGE NOTE PROVIDER - CARE PROVIDERS DIRECT ADDRESSES
,DirectAddress_Unknown ,DirectAddress_Unknown,DirectAddress_Unknown ,DirectAddress_Unknown,DirectAddress_Unknown,[email or web address]

## 2023-03-10 NOTE — PROGRESS NOTE ADULT - SUBJECTIVE AND OBJECTIVE BOX
Date/Time Patient Seen:  		  Referring MD:   Data Reviewed	       Patient is a 82y old  Male who presents with a chief complaint of Left  thigh draining sinus (09 Mar 2023 15:19)      Subjective/HPI     PAST MEDICAL & SURGICAL HISTORY:  Diabetes Mellitus, Type II    CAD (Coronary Artery Disease)  s/p 3v CABG 2004; stents placed in [place] in 2019    Dyslipidemia    Asymptomatic Peripheral Vascular Disease    Osteomyelitis    COPD (chronic obstructive pulmonary disease)  on 2L at home and BiPAP at night; intubated 6/18    Diabetes mellitus    Hypertension    PVD (peripheral vascular disease)    History of PAT (paroxysmal atrial tachycardia)    Asthma with COPD    BPH (benign prostatic hyperplasia)    Acute osteomyelitis    CABG (Coronary Artery Bypass Graft)  2004    Compound fracture  left leg    S/P primary angioplasty with coronary stent    H/O drainage of abscess  Left femur 12/2021          Medication list         MEDICATIONS  (STANDING):  ascorbic acid 500 milliGRAM(s) Oral daily  atorvastatin 20 milliGRAM(s) Oral at bedtime  budesonide 160 MICROgram(s)/formoterol 4.5 MICROgram(s) Inhaler 2 Puff(s) Inhalation two times a day  cefepime   IVPB      cefepime   IVPB 2000 milliGRAM(s) IV Intermittent every 12 hours  cholecalciferol 400 Unit(s) Oral daily  cyanocobalamin 1000 MICROGram(s) Oral daily  dextrose 5%. 1000 milliLiter(s) (50 mL/Hr) IV Continuous <Continuous>  dextrose 5%. 1000 milliLiter(s) (100 mL/Hr) IV Continuous <Continuous>  dextrose 50% Injectable 25 Gram(s) IV Push once  dextrose 50% Injectable 12.5 Gram(s) IV Push once  dextrose 50% Injectable 25 Gram(s) IV Push once  glucagon  Injectable 1 milliGRAM(s) IntraMuscular once  insulin glargine Injectable (LANTUS) 15 Unit(s) SubCutaneous at bedtime  insulin lispro (ADMELOG) corrective regimen sliding scale   SubCutaneous three times a day before meals  insulin lispro (ADMELOG) corrective regimen sliding scale   SubCutaneous at bedtime  metoprolol tartrate 50 milliGRAM(s) Oral two times a day  montelukast 10 milliGRAM(s) Oral daily  multivitamin 1 Tablet(s) Oral daily  Nephro-carlos 1 Tablet(s) Oral daily  pantoprazole  Injectable 40 milliGRAM(s) IV Push every 12 hours  saccharomyces boulardii 250 milliGRAM(s) Oral two times a day  tamsulosin 0.4 milliGRAM(s) Oral at bedtime  tiotropium 2.5 MICROgram(s) Inhaler 2 Puff(s) Inhalation daily    MEDICATIONS  (PRN):  albuterol/ipratropium for Nebulization 3 milliLiter(s) Nebulizer every 6 hours PRN Shortness of Breath and/or Wheezing  dextrose Oral Gel 15 Gram(s) Oral once PRN Blood Glucose LESS THAN 70 milliGRAM(s)/deciliter         Vitals log        ICU Vital Signs Last 24 Hrs  T(C): 36.6 (10 Mar 2023 05:08), Max: 36.9 (09 Mar 2023 20:35)  T(F): 97.9 (10 Mar 2023 05:08), Max: 98.4 (09 Mar 2023 20:35)  HR: 106 (10 Mar 2023 05:08) (98 - 108)  BP: 101/65 (10 Mar 2023 05:08) (82/57 - 101/65)  BP(mean): --  ABP: --  ABP(mean): --  RR: 16 (10 Mar 2023 05:08) (16 - 18)  SpO2: 95% (10 Mar 2023 05:08) (91% - 100%)    O2 Parameters below as of 10 Mar 2023 05:08  Patient On (Oxygen Delivery Method): room air                 Input and Output:  I&O's Detail    08 Mar 2023 07:01  -  09 Mar 2023 07:00  --------------------------------------------------------  IN:    Oral Fluid: 840 mL  Total IN: 840 mL    OUT:    Voided (mL): 750 mL  Total OUT: 750 mL    Total NET: 90 mL      09 Mar 2023 07:01  -  10 Mar 2023 05:19  --------------------------------------------------------  IN:    IV PiggyBack: 1000 mL    Oral Fluid: 480 mL    Sodium Chloride 0.9% Bolus: 1000 mL  Total IN: 2480 mL    OUT:  Total OUT: 0 mL    Total NET: 2480 mL          Lab Data                        7.9    x     )-----------( x        ( 09 Mar 2023 18:25 )             26.7     03-09    139  |  109<H>  |  22  ----------------------------<  209<H>  4.2   |  27  |  1.40<H>    Ca    9.1      09 Mar 2023 06:55  Mg     2.0     03-09              Review of Systems	      Objective     Physical Examination    heart s1s2  lung dc BS  head nc      Pertinent Lab findings & Imaging      Eliecer:  NO   Adequate UO     I&O's Detail    08 Mar 2023 07:01  -  09 Mar 2023 07:00  --------------------------------------------------------  IN:    Oral Fluid: 840 mL  Total IN: 840 mL    OUT:    Voided (mL): 750 mL  Total OUT: 750 mL    Total NET: 90 mL      09 Mar 2023 07:01  -  10 Mar 2023 05:19  --------------------------------------------------------  IN:    IV PiggyBack: 1000 mL    Oral Fluid: 480 mL    Sodium Chloride 0.9% Bolus: 1000 mL  Total IN: 2480 mL    OUT:  Total OUT: 0 mL    Total NET: 2480 mL               Discussed with:     Cultures:	        Radiology

## 2023-03-10 NOTE — PROGRESS NOTE ADULT - SUBJECTIVE AND OBJECTIVE BOX
[facility]  INFECTIOUS DISEASES   [address] [place] [address]  Tel: [phone]     Fax: [phone]  ========================================================  MD Noman Perry Kaushal, MD Cho, Michelle, MD Sunjit, Jaspal, MD  ========================================================    [id number]  [facility]     Follow up: left thigh abscess and OM    No fever, more discharge from left thigh, feels that pain is better now.   Had blood in stool and more drop in H/H so had transfusion last night.     PAST MEDICAL & SURGICAL HISTORY:  Diabetes Mellitus, Type II  CAD (Coronary Artery Disease)  s/p 3v CABG ; stents placed in [place] in   Dyslipidemia  Osteomyelitis  COPD (chronic obstructive pulmonary disease)  on 2L at home and BiPAP at night; intubated   Hypertension  PVD (peripheral vascular disease)  History of PAT (paroxysmal atrial tachycardia)  Asthma with COPD  BPH (benign prostatic hyperplasia)  Acute osteomyelitis  CABG (Coronary Artery Bypass Graft)    Compound fracture  left leg  S/P primary angioplasty with coronary stent  H/O drainage of abscess  Left femur 2021    Social Hx: No current smoking, ETOH or drugs     FAMILY HISTORY:  Family history of diabetes mellitus (Sibling)    Family hx of lung cancer  brother,  age 82, used to smoke with pt    Allergies  No Known Allergies    Intolerances  shellfish (Nausea)    Antibiotics:  Ciprofloxacin      REVIEW OF SYSTEMS:  CONSTITUTIONAL:  No Fever or chills  HEENT:  No diplopia or blurred vision.  No sore throat or runny nose.  CARDIOVASCULAR:  No chest pain or SOB.  RESPIRATORY:  No cough, shortness of breath, PND or orthopnea.  GASTROINTESTINAL:  No nausea, vomiting or diarrhea.  GENITOURINARY:  No dysuria, frequency or urgency. No Blood in urine  MUSCULOSKELETAL:  left thigh pain and drainage   SKIN:  No change in skin, hair or nails.  NEUROLOGIC:  No paresthesias or weakness.  PSYCHIATRIC:  No disorder of thought or mood.  ENDOCRINE:  No heat or cold intolerance, polyuria or polydipsia.  HEMATOLOGICAL:  No easy bruising or bleeding.     Physical Exam:  Vital Signs Last 24 Hrs  T(C): 36.4 (10 Mar 2023 12:15), Max: 36.9 (09 Mar 2023 20:35)  T(F): 97.6 (10 Mar 2023 12:15), Max: 98.4 (09 Mar 2023 20:35)  HR: 98 (10 Mar 2023 12:15) (93 - 112)  BP: 107/65 (10 Mar 2023 12:15) (92/66 - 113/70)  BP(mean): --  RR: 18 (10 Mar 2023 12:15) (16 - 19)  SpO2: 95% (10 Mar 2023 12:15) (93% - 99%)  Parameters below as of 10 Mar 2023 12:15  Patient On (Oxygen Delivery Method): room air  GEN: NAD  HEENT: normocephalic and atraumatic. EOMI. PERRL.    NECK: Supple.  No lymphadenopathy   LUNGS: poor air movement   HEART: Regular rate and rhythm   ABDOMEN: Soft, nontender, and nondistended.  Positive bowel sounds.    : No CVA tenderness  EXTREMITIES: left thigh with scar in lateral side with fluctuation and small opening with yellow discharge   NEUROLOGIC: grossly intact.  PSYCHIATRIC: Appropriate affect .  SKIN: No rash     Labs:                        9.9    10.63 )-----------( 288      ( 10 Mar 2023 05:00 )             31.6     03-10    137  |  108  |  25<H>  ----------------------------<  235<H>  4.3   |  26  |  1.30    Ca    8.7      10 Mar 2023 05:00  Mg     2.0         Culture - Abscess with Gram Stain (collected 23 @ 13:50)  Source: .Abscess left thigh    Culture - Urine (collected 23 @ 20:15)  Source: Clean Catch Clean Catch (Midstream)  Final Report (23 @ 23:02):    <10,000 CFU/mL Normal Urogenital Maria Esther    Culture - Blood (collected 23 @ 15:53)  Source: .Blood Blood-Peripheral    Culture - Blood (collected 23 @ 15:43)  Source: .Blood Blood-Peripheral    WBC Count: 10.63 K/uL (03-10-23 @ 05:00)  WBC Count: 9.56 K/uL (23 @ 06:55)  WBC Count: 6.62 K/uL (23 @ 06:02)  WBC Count: 6.38 K/uL (23 @ 06:55)  WBC Count: 7.17 K/uL (23 @ 22:00)  WBC Count: 7.06 K/uL (23 @ 15:53)    Creatinine, Serum: 1.30 mg/dL (03-10-23 @ 05:00)  Creatinine, Serum: 1.40 mg/dL (23 @ 06:55)  Creatinine, Serum: 1.30 mg/dL (23 @ 06:02)  Creatinine, Serum: 1.30 mg/dL (23 @ 06:55)  Creatinine, Serum: 1.60 mg/dL (23 @ 22:00)  Creatinine, Serum: 1.50 mg/dL (23 @ 15:53)    C-Reactive Protein, Serum: 45 mg/L (23 @ 15:53)    Sedimentation Rate, Erythrocyte: 39 mm/hr (23 @ 15:53)     SARS-CoV-2 Result: NotDetec (23 @ 15:53)    All imaging and other data have been reviewed.  < from: MR Femur No Cont, Left (22 @ 13:40) >  IMPRESSION:  Chronic osteomyelitis with deformity of bone and with scattered areas of   T2 hyperintensity, less prominent than on the previous MRI of 2021,   however cannot exclude superimposed acute osteomyelitis/intraosseous   abscess.  Fluid tract extending from the chronic bony defect is contiguous with a   soft tissue abscess centered deep to the vastus intermedius measuring  12.9 cm craniocaudally, with surrounding surrounding muscle and soft   tissue edema.    Assessment and Plan:   83yo man with PMH of HTN, COPD on home O2, CAD s/p CABG, PVD s/p stents in b/l legs and left femoral fracture more than 50 years ago, was admitted at [facility] in [facility] with left  leg pain on the site of old fracture after CT showed possible intramedullary abscess. He had pain and infection in the old fracture area at least 3-4 times in the past, had multiple surgeries   and drainage of area with a long course of antibiotic treatment.  MRI showed collection, intraosseous abscess  S/P IR drain placement on 21  IR culture NGTD but had another culture with enterobacter.   Completed 6 weeks of ceftriaxone 2gm with PICC line in 2022  He was seen in the clinic and was started on suppressive ciprofloxacin for good oral bioavailability and also based on the culture.  MRI 2022 done showed 12.9cm collection with OM.   He stopped cipro sometimes last year and now feels more pain and swelling in area and started draining again.   Now MRI with 22cm collection.   This morning had Blood in stool, so GI seen him, had drop in H/H s/p transfusion on 3/9.     Recommendations:  - Continue on cefepime 2gm q12  - MRI result noted, IR has been consulted for possible drain placement   - Wound discharge sent for culture today again, last one with CoNS  - GI work up for possible peptic ulcer     Will follow.  Discussed with his wife at the bedside.     Brandi العلي MD  Division of Infectious Diseases   Please call ID service at [phone] with any question.      35 minutes spent on total encounter assessing patient, examination, chart review, counseling and coordinating care by the attending physician/nurse/care manager.

## 2023-03-10 NOTE — DISCHARGE NOTE PROVIDER - NSDCFUSCHEDAPPT_GEN_ALL_CORE_FT
Morro Buckley  [facility]  ENDOVASCULAR [address]   Scheduled Appointment: 03/22/2023    Marko Celaya  [facility]  [address]  Scheduled Appointment: 04/05/2023    Brandi العلي  [facility]  [address]ISEASE [address]  Scheduled Appointment: 05/18/2023     Morro Buckley  [facility]  ENDOVASCULAR [address]   Scheduled Appointment: 04/05/2023    Marko Celaya  [facility]  [address]  Scheduled Appointment: 04/05/2023    Brandi العلي  [facility]  [address]  Scheduled Appointment: 05/18/2023

## 2023-03-10 NOTE — DISCHARGE NOTE PROVIDER - PROVIDER TOKENS
FREE:[LAST:[Sarah],FIRST:[Austin],PHONE:[(   )    -],FAX:[(   )    -],FOLLOWUP:[2 weeks],ESTABLISHEDPATIENT:[T]] FREE:[LAST:[Sarah],FIRST:[[place]],PHONE:[(   )    -],FAX:[(   )    -],FOLLOWUP:[2 weeks],ESTABLISHEDPATIENT:[T]],PROVIDER:[TOKEN:[[id number]],ESTABLISHEDPATIENT:[T]] PROVIDER:[TOKEN:[[id number]],FOLLOWUP:[1 month],ESTABLISHEDPATIENT:[T]],FREE:[LAST:[Sarah],FIRST:[Austin],PHONE:[(   )    -],FAX:[(   )    -],FOLLOWUP:[2 weeks],ESTABLISHEDPATIENT:[T]],PROVIDER:[TOKEN:[[id number]],FOLLOWUP:[1 month]]

## 2023-03-10 NOTE — PROGRESS NOTE ADULT - ASSESSMENT
82 yr male with history of CAD previous CABG with 3 bypass on plavix , Hypertension, COPD  home oxygen dependent, long standing chronic left femoral osteomyelitis following traumatic fracture of  femur in 1966 , present with new drainage from left thigh chronic osteomyelitis .      Problem/Plan - 1:  ·  Problem: Chronic osteomyelitis.   ·  Plan:  Had abscess and drainage in November 2022  Had been variously treated with antibiotics   ID consulted  - Continue Cefepime 2 g iv q 12h  Left femur MRI: Chronic osteomyelitis with deformity of the mid to distal femur with increase in the osseous marrow edema and loss of normal T1 fatty marrow adjacent to the defect concerning for acute on chronic osteomyelitis.   Increase in size in the soft tissue abscess centered deep to the vastus intermedius measuring up to 22cm CC.  Wound care.   IR guided drain placement for abscess     Problem/Plan - 2:  ·  Problem: HTN (hypertension).   ·  Plan: Metoprolol 50 mg po bid with holding parameters     Problem/Plan - 3:  ·  Problem: DM (diabetes mellitus), type 2.   ·  Plan: Insulin sliding scale , on home Jardiance   Lantus 15 units SQ QHS and moderate lispro sliding scale, monitor FS.     Problem/Plan - 4:  ·  Problem: CAD (coronary artery disease).   ·  Plan: hold ASA  due to GIB   Atorvastatin 20 mg po daily.     Problem/Plan - 5:  ·  Problem: COPD, moderate.   ·  Plan: Inhaled broncho dilators , on home incruse and breo-formulary interchange   Singulair 10 mg po hs  not on home Prednisone, will d/c  on BIPAP at night   Xray: < from: Xray Chest 1 View- PORTABLE-Urgent (Xray Chest 1 View- PORTABLE-Urgent .) (03.07.23 @ 09:59) >    IMPRESSION: COPD left base scarring again noted.  pulmonary f/u     Problem/Plan - 6:  ·  Problem: History of atrial paroxysmal tachycardia.   ·  Plan:  Hold Apixaban 2.5 mg po bid given concern for GIB     Problem/Plan - 7:  ·  Problem: BPH with obstruction/lower urinary tract symptoms.   ·  Plan: Tamsulosin 0.4 mg po daily.    GI bleed and ABLA:  BRBPR on 3/9.  Hold Eliquis.  s/p 2 units PRBC transfusion.  Continue Protonix 40mg IV Q12h.  Awaiting AM labs.  GI f/u    MERRILL:  s/p 2L NS hydration and 2units PRBC transfusion.  Awaiting AM labs.      VTE ppx:  SCD

## 2023-03-10 NOTE — PROGRESS NOTE ADULT - SUBJECTIVE AND OBJECTIVE BOX
CHIEF COMPLAINT/INTERVAL HISTORY:  Pt. seen and evaluated for GI bleed/ABLA and chronic osteomyelitis.  Pt. is in no distress.  Denies feeling lightheaded.  s/p 2 units PRBC over the night.  Tolerating IV antibiotics.   Denies having any gross bleeding since yesterday morning.      REVIEW OF SYSTEMS:  No fever, CP, SOB, or abdominal pain    Vital Signs Last 24 Hrs  T(C): 36.8 (10 Mar 2023 06:50), Max: 36.9 (09 Mar 2023 20:35)  T(F): 98.2 (10 Mar 2023 06:50), Max: 98.4 (09 Mar 2023 20:35)  HR: 93 (10 Mar 2023 08:17) (93 - 112)  BP: 101/70 (10 Mar 2023 06:50) (91/63 - 113/70)  BP(mean): --  RR: 18 (10 Mar 2023 06:50) (16 - 19)  SpO2: 98% (10 Mar 2023 08:17) (91% - 100%)    Parameters below as of 10 Mar 2023 08:17  Patient On (Oxygen Delivery Method): room air        PHYSICAL EXAM:  GENERAL: NAD  HEENT: EOMI, hearing normal, conjunctiva and sclera clear  Chest: CTA bilaterally, no wheezing  CV: S1S2, RRR,   GI: soft, +BS, NT/ND  Musculoskeletal: no edema, dressing over left lateral thigh  Psychiatric: affect nL, mood nL  Skin: warm and dry    LABS:                        7.9    x     )-----------( x        ( 09 Mar 2023 18:25 )             26.7     03-09    139  |  109<H>  |  22  ----------------------------<  209<H>  4.2   |  27  |  1.40<H>    Ca    9.1      09 Mar 2023 06:55  Mg     2.0     03-09

## 2023-03-10 NOTE — DISCHARGE NOTE PROVIDER - POSTFACE STATEMENT FOR MINUTES SPENT
"Chief Complaint   Patient presents with     Pre-Op Exam       Initial /72 (BP Location: Left arm, Patient Position: Sitting, Cuff Size: Adult Regular)   Pulse 52   Temp 98.2  F (36.8  C) (Tympanic)   Ht 1.651 m (5' 5\")   Wt 62.1 kg (137 lb)   SpO2 98%   BMI 22.80 kg/m   Estimated body mass index is 22.8 kg/m  as calculated from the following:    Height as of this encounter: 1.651 m (5' 5\").    Weight as of this encounter: 62.1 kg (137 lb).  Medication Reconciliation: complete  Lulú Mays LPN  " minutes on the discharge service.

## 2023-03-10 NOTE — DISCHARGE NOTE PROVIDER - ATTENDING DISCHARGE PHYSICAL EXAMINATION:
Gen: Lying in bed comfortably. NAD.  Neuro: Answers questions appropriately. Follows command.  HEENT: EOMI. Conjunctiva and sclera clear.  Neck: Supple. No JVD.  Resp: CTA b/l. No crackles, wheezing, or rhonchi.  CVS: +S1, S2. RRR. No murmurs, rubs, or gallops.  Abd: Soft, nontender, nondistended. +BS.  Ext: No clubbing, cyanosis. No calf tenderness. No edema. L thigh wound drainage with dressings c/d/i.  Skin: Warm and perfused.

## 2023-03-10 NOTE — PROGRESS NOTE ADULT - ASSESSMENT
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     OM  COURTNEY  Asthma  COPD  HTN  CAD  CABG hx    home NIV device for COPD night time  vs noted  labs reviewed  am Hgb pending  GI and Surgery notes reviewed    NIV use night time and prn - 18/8 and 25 pct fio2  sleep hygiene reviewed  cvs rx regimen  BP control  pt is on Nucala in the office - Monthly with Dr Oscar Marcelino - [facility]  on Symbicort - Spiriva - Singulair - copd - asthma -   NEBS PRN  monitor VS and Sat  keep sat > 88 pct  spoke with pt - wife  outpatient records reviewed  emp ABX in progress - ID follow up  skin - wound care

## 2023-03-11 LAB
ANION GAP SERPL CALC-SCNC: 5 MMOL/L — SIGNIFICANT CHANGE UP (ref 5–17)
BASOPHILS # BLD AUTO: 0.02 K/UL — SIGNIFICANT CHANGE UP (ref 0–0.2)
BASOPHILS NFR BLD AUTO: 0.2 % — SIGNIFICANT CHANGE UP (ref 0–2)
BUN SERPL-MCNC: 22 MG/DL — SIGNIFICANT CHANGE UP (ref 7–23)
CALCIUM SERPL-MCNC: 8.7 MG/DL — SIGNIFICANT CHANGE UP (ref 8.5–10.1)
CHLORIDE SERPL-SCNC: 109 MMOL/L — HIGH (ref 96–108)
CO2 SERPL-SCNC: 25 MMOL/L — SIGNIFICANT CHANGE UP (ref 22–31)
CREAT SERPL-MCNC: 1.1 MG/DL — SIGNIFICANT CHANGE UP (ref 0.5–1.3)
CULTURE RESULTS: SIGNIFICANT CHANGE UP
CULTURE RESULTS: SIGNIFICANT CHANGE UP
EGFR: 67 ML/MIN/1.73M2 — SIGNIFICANT CHANGE UP
EOSINOPHIL # BLD AUTO: 0.02 K/UL — SIGNIFICANT CHANGE UP (ref 0–0.5)
EOSINOPHIL NFR BLD AUTO: 0.2 % — SIGNIFICANT CHANGE UP (ref 0–6)
GLUCOSE SERPL-MCNC: 189 MG/DL — HIGH (ref 70–99)
HCT VFR BLD CALC: 25.1 % — LOW (ref 39–50)
HCT VFR BLD CALC: 30.4 % — LOW (ref 39–50)
HGB BLD-MCNC: 7.9 G/DL — LOW (ref 13–17)
HGB BLD-MCNC: 9.8 G/DL — LOW (ref 13–17)
IMM GRANULOCYTES NFR BLD AUTO: 1 % — HIGH (ref 0–0.9)
LYMPHOCYTES # BLD AUTO: 1.11 K/UL — SIGNIFICANT CHANGE UP (ref 1–3.3)
LYMPHOCYTES # BLD AUTO: 11 % — LOW (ref 13–44)
MAGNESIUM SERPL-MCNC: 1.8 MG/DL — SIGNIFICANT CHANGE UP (ref 1.6–2.6)
MCHC RBC-ENTMCNC: 28.5 PG — SIGNIFICANT CHANGE UP (ref 27–34)
MCHC RBC-ENTMCNC: 31.5 GM/DL — LOW (ref 32–36)
MCV RBC AUTO: 90.6 FL — SIGNIFICANT CHANGE UP (ref 80–100)
MONOCYTES # BLD AUTO: 0.93 K/UL — HIGH (ref 0–0.9)
MONOCYTES NFR BLD AUTO: 9.2 % — SIGNIFICANT CHANGE UP (ref 2–14)
NEUTROPHILS # BLD AUTO: 7.9 K/UL — HIGH (ref 1.8–7.4)
NEUTROPHILS NFR BLD AUTO: 78.4 % — HIGH (ref 43–77)
NRBC # BLD: 0 /100 WBCS — SIGNIFICANT CHANGE UP (ref 0–0)
PLATELET # BLD AUTO: 244 K/UL — SIGNIFICANT CHANGE UP (ref 150–400)
POTASSIUM SERPL-MCNC: 4.1 MMOL/L — SIGNIFICANT CHANGE UP (ref 3.5–5.3)
POTASSIUM SERPL-SCNC: 4.1 MMOL/L — SIGNIFICANT CHANGE UP (ref 3.5–5.3)
RBC # BLD: 2.77 M/UL — LOW (ref 4.2–5.8)
RBC # FLD: 14.1 % — SIGNIFICANT CHANGE UP (ref 10.3–14.5)
SODIUM SERPL-SCNC: 139 MMOL/L — SIGNIFICANT CHANGE UP (ref 135–145)
SPECIMEN SOURCE: SIGNIFICANT CHANGE UP
SPECIMEN SOURCE: SIGNIFICANT CHANGE UP
WBC # BLD: 10.08 K/UL — SIGNIFICANT CHANGE UP (ref 3.8–10.5)
WBC # FLD AUTO: 10.08 K/UL — SIGNIFICANT CHANGE UP (ref 3.8–10.5)

## 2023-03-11 PROCEDURE — 99223 1ST HOSP IP/OBS HIGH 75: CPT

## 2023-03-11 PROCEDURE — 99233 SBSQ HOSP IP/OBS HIGH 50: CPT

## 2023-03-11 RX ADMIN — Medication 4: at 12:15

## 2023-03-11 RX ADMIN — BUDESONIDE AND FORMOTEROL FUMARATE DIHYDRATE 2 PUFF(S): 160; 4.5 AEROSOL RESPIRATORY (INHALATION) at 18:51

## 2023-03-11 RX ADMIN — Medication 250 MILLIGRAM(S): at 05:56

## 2023-03-11 RX ADMIN — Medication 50 MILLIGRAM(S): at 17:17

## 2023-03-11 RX ADMIN — TIOTROPIUM BROMIDE 2 PUFF(S): 18 CAPSULE ORAL; RESPIRATORY (INHALATION) at 05:57

## 2023-03-11 RX ADMIN — Medication 2: at 16:54

## 2023-03-11 RX ADMIN — Medication 1 TABLET(S): at 11:51

## 2023-03-11 RX ADMIN — INSULIN GLARGINE 15 UNIT(S): 100 INJECTION, SOLUTION SUBCUTANEOUS at 21:43

## 2023-03-11 RX ADMIN — PREGABALIN 1000 MICROGRAM(S): 225 CAPSULE ORAL at 11:51

## 2023-03-11 RX ADMIN — BUDESONIDE AND FORMOTEROL FUMARATE DIHYDRATE 2 PUFF(S): 160; 4.5 AEROSOL RESPIRATORY (INHALATION) at 05:58

## 2023-03-11 RX ADMIN — CEFEPIME 100 MILLIGRAM(S): 1 INJECTION, POWDER, FOR SOLUTION INTRAMUSCULAR; INTRAVENOUS at 05:56

## 2023-03-11 RX ADMIN — Medication 500 MILLIGRAM(S): at 11:51

## 2023-03-11 RX ADMIN — CEFEPIME 100 MILLIGRAM(S): 1 INJECTION, POWDER, FOR SOLUTION INTRAMUSCULAR; INTRAVENOUS at 18:06

## 2023-03-11 RX ADMIN — MONTELUKAST 10 MILLIGRAM(S): 4 TABLET, CHEWABLE ORAL at 11:51

## 2023-03-11 RX ADMIN — Medication 250 MILLIGRAM(S): at 18:06

## 2023-03-11 RX ADMIN — Medication 400 UNIT(S): at 11:51

## 2023-03-11 RX ADMIN — PANTOPRAZOLE SODIUM 40 MILLIGRAM(S): 20 TABLET, DELAYED RELEASE ORAL at 05:56

## 2023-03-11 RX ADMIN — Medication 4: at 09:23

## 2023-03-11 RX ADMIN — Medication 50 MILLIGRAM(S): at 05:56

## 2023-03-11 RX ADMIN — ATORVASTATIN CALCIUM 20 MILLIGRAM(S): 80 TABLET, FILM COATED ORAL at 21:42

## 2023-03-11 RX ADMIN — PANTOPRAZOLE SODIUM 40 MILLIGRAM(S): 20 TABLET, DELAYED RELEASE ORAL at 18:05

## 2023-03-11 RX ADMIN — TAMSULOSIN HYDROCHLORIDE 0.4 MILLIGRAM(S): 0.4 CAPSULE ORAL at 21:42

## 2023-03-11 NOTE — CONSULT NOTE ADULT - NS ATTEND AMEND GEN_ALL_CORE FT
I have personally seen and examined the patient in detail.  I have spoken to the provider regarding the assessment and plan of care.  I have made changes to the note accordingly.    stable CAD  plan for EGD/colonoscopy  Pt has no active ischemia, decompensated heart failure, unstable arrhythmia, or severe stenotic valvular disease.  Pt has no modifiable active cardiac risk factors and in the setting of intermediate risk procedure, pt is optimized as best as possible from cardiovascular standpoint to proceed with planned procedure with routine hemodynamic monitoring.

## 2023-03-11 NOTE — CONSULT NOTE ADULT - SUBJECTIVE AND OBJECTIVE BOX
[facility] - Génesis Villavicencio, [place], [place], [place], Kumar; Office Number: [phone]    Initial Consult Note  CHIEF COMPLAINT: Patient is a 82y old  Male who presents with a chief complaint of Left  thigh draining sinus (11 Mar 2023 09:14)    HPI: 82 yr male with history of Hypertension, COPD home oxygen dependent, CAD CABG , chronic left femor osteomyelitis since traumatic left femoral fracture in  presents with new yellowish  drainage from left thigh. Cardiology consulted for clearance for GI scopy. GI consulted for BRBPR. No date yet for scopy. Patient and wife unsure if they want endoscopic eval. Also he has had Eliquis.     Allergies  [This allergen will not trigger allergy alert] IV DYE, IODINE CONTRAST (Unknown)  [This allergen will not trigger allergy alert] NKDA (Unknown)  latex (Unknown)    Intolerances    shellfish (Nausea)    PAST MEDICAL & SURGICAL HISTORY:  Diabetes Mellitus, Type II      CAD (Coronary Artery Disease)  s/p 3v CABG ; stents placed in [place] in       Dyslipidemia      Osteomyelitis      COPD (chronic obstructive pulmonary disease)  on 2L at home and BiPAP at night; intubated       Hypertension      Hypertension      PVD (peripheral vascular disease)      History of PAT (paroxysmal atrial tachycardia)      Asthma with COPD      BPH (benign prostatic hyperplasia)      Acute osteomyelitis      CABG (Coronary Artery Bypass Graft)        Compound fracture  left leg      S/P primary angioplasty with coronary stent      H/O drainage of abscess  Left femur 2021        MEDICATIONS  (STANDING):  ascorbic acid 500 milliGRAM(s) Oral daily  atorvastatin 20 milliGRAM(s) Oral at bedtime  budesonide 160 MICROgram(s)/formoterol 4.5 MICROgram(s) Inhaler 2 Puff(s) Inhalation two times a day  cefepime   IVPB      cefepime   IVPB 2000 milliGRAM(s) IV Intermittent every 12 hours  cholecalciferol 400 Unit(s) Oral daily  cyanocobalamin 1000 MICROGram(s) Oral daily  dextrose 5%. 1000 milliLiter(s) (50 mL/Hr) IV Continuous <Continuous>  dextrose 5%. 1000 milliLiter(s) (100 mL/Hr) IV Continuous <Continuous>  dextrose 50% Injectable 25 Gram(s) IV Push once  dextrose 50% Injectable 12.5 Gram(s) IV Push once  dextrose 50% Injectable 25 Gram(s) IV Push once  glucagon  Injectable 1 milliGRAM(s) IntraMuscular once  insulin glargine Injectable (LANTUS) 15 Unit(s) SubCutaneous at bedtime  insulin lispro (ADMELOG) corrective regimen sliding scale   SubCutaneous three times a day before meals  insulin lispro (ADMELOG) corrective regimen sliding scale   SubCutaneous at bedtime  mepolizumab Subcutaneous Injectable 100 milliGRAM(s) SubCutaneous every 4 weeks  metoprolol tartrate 50 milliGRAM(s) Oral two times a day  montelukast 10 milliGRAM(s) Oral daily  multivitamin 1 Tablet(s) Oral daily  Nephro-carlos 1 Tablet(s) Oral daily  pantoprazole  Injectable 40 milliGRAM(s) IV Push every 12 hours  saccharomyces boulardii 250 milliGRAM(s) Oral two times a day  tamsulosin 0.4 milliGRAM(s) Oral at bedtime  tiotropium 2.5 MICROgram(s) Inhaler 2 Puff(s) Inhalation daily    MEDICATIONS  (PRN):  albuterol/ipratropium for Nebulization 3 milliLiter(s) Nebulizer every 6 hours PRN Shortness of Breath and/or Wheezing  dextrose Oral Gel 15 Gram(s) Oral once PRN Blood Glucose LESS THAN 70 milliGRAM(s)/deciliter    FAMILY HISTORY:  Family history of diabetes mellitus (Sibling)    Family hx of lung cancer  brother,  age 82, used to smoke with pt       No family history of acute MI or sudden cardiac death.    SOCIAL HISTORY: No active tobacco, ethanol, or drug abuse.    REVIEW OF SYSTEMS   All other review of systems is negative unless indicated above.    VITAL SIGNS:   Vital Signs Last 24 Hrs  T(C): 37.2 (11 Mar 2023 05:11), Max: 37.2 (11 Mar 2023 05:11)  T(F): 98.9 (11 Mar 2023 05:11), Max: 98.9 (11 Mar 2023 05:11)  HR: 112 (11 Mar 2023 05:11) (92 - 112)  BP: 107/68 (11 Mar 2023 05:11) (101/59 - 107/68)  BP(mean): --  RR: 19 (11 Mar 2023 05:11) (18 - 19)  SpO2: 98% (11 Mar 2023 09:08) (95% - 98%)    Parameters below as of 11 Mar 2023 09:08  Patient On (Oxygen Delivery Method): room air        Physical Exam:  Constitutional: NAD, awake and alert  HEENT: Moist Mucous Membranes, Anicteric  Pulmonary: Non-labored, breath sounds are clear bilaterally, No wheezing, rales or rhonchi  Cardiovascular: Regular, S1 and S2, No murmurs, No rubs, gallops or clicks  Gastrointestinal: Bowel Sounds present, soft, nontender.   Lymph: No peripheral edema. No lymphadenopathy.  Skin: No visible rashes or ulcers.  Psych:  Mood & affect appropriate    I&O's Summary      LABS: All Labs Reviewed:                        7.9    10.08 )-----------( 244      ( 11 Mar 2023 07:12 )             25.1                         9.9    10.63 )-----------( 288      ( 10 Mar 2023 05:00 )             31.6                         7.9    x     )-----------( x        ( 09 Mar 2023 18:25 )             26.7     11 Mar 2023 07:12    139    |  109    |  22     ----------------------------<  189    4.1     |  25     |  1.10   10 Mar 2023 05:00    137    |  108    |  25     ----------------------------<  235    4.3     |  26     |  1.30   09 Mar 2023 06:55    139    |  109    |  22     ----------------------------<  209    4.2     |  27     |  1.40     Ca    8.7        11 Mar 2023 07:12  Ca    8.7        10 Mar 2023 05:00  Ca    9.1        09 Mar 2023 06:55  Mg     1.8       11 Mar 2023 07:12  Mg     2.0       09 Mar 2023 06:55        Blood Culture: Organism --  Gram Stain Blood -- Gram Stain --  Specimen Source .Abscess left thigh  Culture-Blood --    Organism --  Gram Stain Blood -- Gram Stain --  Specimen Source Clean Catch Clean Catch (Midstream)  Culture-Blood --    Organism --  Gram Stain Blood -- Gram Stain --  Specimen Source .Blood Blood-Peripheral  Culture-Blood --    Organism --  Gram Stain Blood -- Gram Stain --  Specimen Source .Blood Blood-Peripheral  Culture-Blood --      12 Lead ECG:   Ventricular Rate 93 BPM    Atrial Rate 93 BPM    P-R Interval 162 ms    QRS Duration 86 ms    Q-T Interval 354 ms    QTC Calculation(Bazett) 440 ms    P Axis 82 degrees    R Axis 83 degrees    T Axis 68 degrees    Diagnosis Line Normal sinus rhythm  Normal ECG  When compared with ECG of 2022 08:19,  No significant change was found  Confirmed by deep Heath ([id number]) on 3/7/2023 2:22:00 PM (23 @ 19:32)       EXAM:  ECHO TTE WO CON COMP W DOPP         PROCEDURE DATE:  2021        INTERPRETATION:  INDICATION: Ischemic heart disease  sonographer KL    Blood Pressure 123/70    Height 180.3 cm     Weight 97.5 kg       BSA 2.2   sq m    Dimensions:  LA 3.0       Normal Values: 2.0 - 4.0 cm  Ao 3.5        Normal Values: 2.0 - 3.8 cm  SEPTUM 1.3       Normal Values: 0.6 - 1.2 cm  PWT 1.0       Normal Values: 0.6 - 1.1 cm  LVIDd 4.3         Normal Values: 3.0 - 5.6 cm  LVIDs 1.8         Normal Values: 1.8 - 4.0 cm      OBSERVATIONS:  Technically difficult and limited study  Mitral Valve: normal, trace physiologic MR.  Aortic Valve/Aorta: Sclerotic trileaflet aortic valve with normal opening.  Tricuspid Valve: Not well-visualized  Pulmonic Valve: Not well-visualized  Left Atrium: normal  Right Atrium: Not well-visualized  Left Ventricle: The left ventricular endocardium is not well-visualized.   Overall normal systolic function with an estimated LVEF of 55%. Cannot   evaluate for segmental abnormalities  Right Ventricle: Not well-visualized  Pericardium: no significant pericardial effusion.  Pulmonary/RV Pressure: estimated PA systolic pressure of 28 mmHg  LV diastolic dysfunction is present        IMPRESSION:  Technically difficult and limited study  The left ventricular endocardium is not well-visualized. Overall normal   systolic function with an estimated LVEF of 55-60%. Cannot evaluate for   segmental abnormalities  The right ventricle is not well-visualized  Sclerotic trileaflet aortic valve, without AI.  Trace physiologic MR  No significant pericardial effusion.  --- End of Report ---      ARISTIDES LEE MD; Attending Cardiologist  This document has been electronically signed. 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 Cardiology consulted for clearance for GI scopy. GI consulted for BRBPR. Plan for GI scopy. Pt sees Dr Breaux for cardiology. Had recent echo cardiogram. He was planning to get a peripheral angioplasty and because of the darinage in left leg the procedure was postponed. Pt is on lasix 20 mg PO daily at home and wife reports LA. He is receiving 1 unit of PRBC now.     Allergies  [This allergen will not trigger allergy alert] IV DYE, IODINE CONTRAST (Unknown)  [This allergen will not trigger allergy alert] NKDA (Unknown)  latex (Unknown)    Intolerances    shellfish (Nausea)    PAST MEDICAL & SURGICAL HISTORY:  Diabetes Mellitus, Type II      CAD (Coronary Artery Disease)  s/p 3v CABG ; stents placed in [place] in 2019      Dyslipidemia      Osteomyelitis      COPD (chronic obstructive pulmonary disease)  on 2L at home and BiPAP at night; intubated       Hypertension      Hypertension      PVD (peripheral vascular disease)      History of PAT (paroxysmal atrial tachycardia)      Asthma with COPD      BPH (benign prostatic hyperplasia)      Acute osteomyelitis      CABG (Coronary Artery Bypass Graft)        Compound fracture  left leg      S/P primary angioplasty with coronary stent      H/O drainage of abscess  Left femur 2021        MEDICATIONS  (STANDING):  ascorbic acid 500 milliGRAM(s) Oral daily  atorvastatin 20 milliGRAM(s) Oral at bedtime  budesonide 160 MICROgram(s)/formoterol 4.5 MICROgram(s) Inhaler 2 Puff(s) Inhalation two times a day  cefepime   IVPB      cefepime   IVPB 2000 milliGRAM(s) IV Intermittent every 12 hours  cholecalciferol 400 Unit(s) Oral daily  cyanocobalamin 1000 MICROGram(s) Oral daily  dextrose 5%. 1000 milliLiter(s) (50 mL/Hr) IV Continuous <Continuous>  dextrose 5%. 1000 milliLiter(s) (100 mL/Hr) IV Continuous <Continuous>  dextrose 50% Injectable 25 Gram(s) IV Push once  dextrose 50% Injectable 12.5 Gram(s) IV Push once  dextrose 50% Injectable 25 Gram(s) IV Push once  glucagon  Injectable 1 milliGRAM(s) IntraMuscular once  insulin glargine Injectable (LANTUS) 15 Unit(s) SubCutaneous at bedtime  insulin lispro (ADMELOG) corrective regimen sliding scale   SubCutaneous three times a day before meals  insulin lispro (ADMELOG) corrective regimen sliding scale   SubCutaneous at bedtime  mepolizumab Subcutaneous Injectable 100 milliGRAM(s) SubCutaneous every 4 weeks  metoprolol tartrate 50 milliGRAM(s) Oral two times a day  montelukast 10 milliGRAM(s) Oral daily  multivitamin 1 Tablet(s) Oral daily  Nephro-carlos 1 Tablet(s) Oral daily  pantoprazole  Injectable 40 milliGRAM(s) IV Push every 12 hours  saccharomyces boulardii 250 milliGRAM(s) Oral two times a day  tamsulosin 0.4 milliGRAM(s) Oral at bedtime  tiotropium 2.5 MICROgram(s) Inhaler 2 Puff(s) Inhalation daily    MEDICATIONS  (PRN):  albuterol/ipratropium for Nebulization 3 milliLiter(s) Nebulizer every 6 hours PRN Shortness of Breath and/or Wheezing  dextrose Oral Gel 15 Gram(s) Oral once PRN Blood Glucose LESS THAN 70 milliGRAM(s)/deciliter    FAMILY HISTORY:  Family history of diabetes mellitus (Sibling)    Family hx of lung cancer  brother,  age 82, used to smoke with pt       No family history of acute MI or sudden cardiac death.    SOCIAL HISTORY: No active tobacco, ethanol, or drug abuse.    REVIEW OF SYSTEMS   All other review of systems is negative unless indicated above.    VITAL SIGNS:   Vital Signs Last 24 Hrs  T(C): 37.2 (11 Mar 2023 05:11), Max: 37.2 (11 Mar 2023 05:11)  T(F): 98.9 (11 Mar 2023 05:11), Max: 98.9 (11 Mar 2023 05:11)  HR: 112 (11 Mar 2023 05:11) (92 - 112)  BP: 107/68 (11 Mar 2023 05:11) (101/59 - 107/68)  BP(mean): --  RR: 19 (11 Mar 2023 05:11) (18 - 19)  SpO2: 98% (11 Mar 2023 09:08) (95% - 98%)    Parameters below as of 11 Mar 2023 09:08  Patient On (Oxygen Delivery Method): room air        Physical Exam:  Constitutional: NAD, awake and alert, Obese  HEENT: Moist Mucous Membranes, Anicteric  Pulmonary: Non-labored, breath sounds are clear bilaterally, No wheezing, fine  rales at base  Cardiovascular: Regular, S1 and S2, No murmurs, No rubs, gallops or clicks  Gastrointestinal: Bowel Sounds present, soft, nontender.   Lymph: No peripheral edema. No lymphadenopathy.  Skin: No visible rashes or ulcers.  Psych:  Mood & affect appropriate    I&O's Summary      LABS: All Labs Reviewed:                        7.9    10.08 )-----------( 244      ( 11 Mar 2023 07:12 )             25.1                         9.9    10.63 )-----------( 288      ( 10 Mar 2023 05:00 )             31.6                         7.9    x     )-----------( x        ( 09 Mar 2023 18:25 )             26.7     11 Mar 2023 07:12    139    |  109    |  22     ----------------------------<  189    4.1     |  25     |  1.10   10 Mar 2023 05:00    137    |  108    |  25     ----------------------------<  235    4.3     |  26     |  1.30   09 Mar 2023 06:55    139    |  109    |  22     ----------------------------<  209    4.2     |  27     |  1.40     Ca    8.7        11 Mar 2023 07:12  Ca    8.7        10 Mar 2023 05:00  Ca    9.1        09 Mar 2023 06:55  Mg     1.8       11 Mar 2023 07:12  Mg     2.0       09 Mar 2023 06:55        Blood Culture: Organism --  Gram Stain Blood -- Gram Stain --  Specimen Source .Abscess left thigh  Culture-Blood --    Organism --  Gram Stain Blood -- Gram Stain --  Specimen Source Clean Catch Clean Catch (Midstream)  Culture-Blood --    Organism --  Gram Stain Blood -- Gram Stain --  Specimen Source .Blood Blood-Peripheral  Culture-Blood --    Organism --  Gram Stain Blood -- Gram Stain --  Specimen Source .Blood Blood-Peripheral  Culture-Blood --      12 Lead ECG:   Ventricular Rate 93 BPM    Atrial Rate 93 BPM    P-R Interval 162 ms    QRS Duration 86 ms    Q-T Interval 354 ms    QTC Calculation(Bazett) 440 ms    P Axis 82 degrees    R Axis 83 degrees    T Axis 68 degrees    Diagnosis Line Normal sinus rhythm  Normal ECG  When compared with ECG of 2022 08:19,  No significant change was found  Confirmed by deep Heath ([id number]) on 3/7/2023 2:22:00 PM (23 @ 19:32)       EXAM:  ECHO TTE WO CON COMP W DOPP         PROCEDURE DATE:  2021        INTERPRETATION:  INDICATION: Ischemic heart disease  sonographer KL    Blood Pressure 123/70    Height 180.3 cm     Weight 97.5 kg       BSA 2.2   sq m    Dimensions:  LA 3.0       Normal Values: 2.0 - 4.0 cm  Ao 3.5        Normal Values: 2.0 - 3.8 cm  SEPTUM 1.3       Normal Values: 0.6 - 1.2 cm  PWT 1.0       Normal Values: 0.6 - 1.1 cm  LVIDd 4.3         Normal Values: 3.0 - 5.6 cm  LVIDs 1.8         Normal Values: 1.8 - 4.0 cm      OBSERVATIONS:  Technically difficult and limited study  Mitral Valve: normal, trace physiologic MR.  Aortic Valve/Aorta: Sclerotic trileaflet aortic valve with normal opening.  Tricuspid Valve: Not well-visualized  Pulmonic Valve: Not well-visualized  Left Atrium: normal  Right Atrium: Not well-visualized  Left Ventricle: The left ventricular endocardium is not well-visualized.   Overall normal systolic function with an estimated LVEF of 55%. Cannot   evaluate for segmental abnormalities  Right Ventricle: Not well-visualized  Pericardium: no significant pericardial effusion.  Pulmonary/RV Pressure: estimated PA systolic pressure of 28 mmHg  LV diastolic dysfunction is present        IMPRESSION:  Technically difficult and limited study  The left ventricular endocardium is not well-visualized. Overall normal   systolic function with an estimated LVEF of 55-60%. Cannot evaluate for   segmental abnormalities  The right ventricle is not well-visualized  Sclerotic trileaflet aortic valve, without AI.  Trace physiologic MR  No significant pericardial effusion.  --- End of Report ---      ARISTIDES LEE MD; Attending Cardiologist  This document has been electronically signed. Dec 21 2021  1:38PM

## 2023-03-11 NOTE — CONSULT NOTE ADULT - ASSESSMENT
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 Cardiology consulted for clearance for GI scopy.     Cardiac clearance HTN, CAD, CABG  - known CAD, CABG  - EKG showed SR @ 93.   - No evidence of any active ischemia  - Holding aspirin 2/2 GI bleed  - Continue statin and BB    - H/o atrial paroxysmal tachycardia.   - Holding Apixaban 2.5 mg po bid given concern for GIB  - Rate 90-100s per flow sheets     - Previous TTE 12/21 showed overall normal systolic function with an estimated LVEF of 55-60%.   - No evidence of any meaningful volume overload     - BP stable and controlled    - GI consulted for BRBPR. S/p PRBCs   - No date yet for scopy. Patient and wife unsure if they want endoscopic eval. Also he has had Eliquis.   - Pt has no active ischemia, decompensated heart failure, unstable arrythmia, or severe stenotic valvular disease. Patient is optimized from cardiovascular standpoint to proceed with planned procedure with routine hemodynamic monitoring.     - ID following and is on antibiotics for chr osteomyelitis.     - SCDs for DVT prophylaxis   - Monitor and replete lytes, keep K>4, Mg>2.  - Will continue to follow.    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 Cardiology consulted for clearance for GI scopy.     Cardiac clearance HTN, CAD, CABG  - known CAD, CABG  - EKG showed SR @ 93.   - No evidence of any active ischemia  - Holding aspirin 2/2 GI bleed  - Continue statin and BB    - H/o atrial paroxysmal tachycardia.   - Holding Apixaban 2.5 mg po bid given concern for GIB  - Tele with 90-100s per flow sheets     - Previous TTE 12/21 showed overall normal systolic function with an estimated LVEF of 55-60%.   - Pt sees Dr Breaux for cardiology. Had recent echo cardiogram. Please obtain results   - Pt is on Lasix 20 mg PO daily at home which is on hold due to [place]RI[place] and wife reports LA.   - He is receiving 1 unit of PRBC now.   - Resume home Lasix   - Would give Lasix 20 mg IV x 1 if pt becomes SOB post transfusion.     - BP stable and controlled    - GI consulted for BRBPR. S/p PRBCs   - Plan for GI scopy.   - Pt has no active ischemia, decompensated heart failure, unstable arrythmia, or severe stenotic valvular disease. Patient is optimized from cardiovascular standpoint to proceed with planned procedure with routine hemodynamic monitoring.     - ID following and is on antibiotics for chr osteomyelitis.     - SCDs for DVT prophylaxis   - Monitor and replete lytes, keep K>4, Mg>2.  - Will continue to follow.    Elizabeth Negron, MS FNP, [facility]  Nurse Practitioner- Cardiology   Spectra #[phone]

## 2023-03-11 NOTE — PROGRESS NOTE ADULT - SUBJECTIVE AND OBJECTIVE BOX
Date/Time Patient Seen:  		  Referring MD:   Data Reviewed	       Patient is a 82y old  Male who presents with a chief complaint of Left  thigh draining sinus (10 Mar 2023 13:40)      Subjective/HPI     PAST MEDICAL & SURGICAL HISTORY:  Diabetes Mellitus, Type II    CAD (Coronary Artery Disease)  s/p 3v CABG 2004; stents placed in [place] in 2019    Dyslipidemia    Asymptomatic Peripheral Vascular Disease    Osteomyelitis    COPD (chronic obstructive pulmonary disease)  on 2L at home and BiPAP at night; intubated 6/18    Diabetes mellitus    Hypertension    PVD (peripheral vascular disease)    History of PAT (paroxysmal atrial tachycardia)    Asthma with COPD    BPH (benign prostatic hyperplasia)    Acute osteomyelitis    CABG (Coronary Artery Bypass Graft)  2004    Compound fracture  left leg    S/P primary angioplasty with coronary stent    H/O drainage of abscess  Left femur 12/2021          Medication list         MEDICATIONS  (STANDING):  ascorbic acid 500 milliGRAM(s) Oral daily  atorvastatin 20 milliGRAM(s) Oral at bedtime  budesonide 160 MICROgram(s)/formoterol 4.5 MICROgram(s) Inhaler 2 Puff(s) Inhalation two times a day  cefepime   IVPB      cefepime   IVPB 2000 milliGRAM(s) IV Intermittent every 12 hours  cholecalciferol 400 Unit(s) Oral daily  cyanocobalamin 1000 MICROGram(s) Oral daily  dextrose 5%. 1000 milliLiter(s) (50 mL/Hr) IV Continuous <Continuous>  dextrose 5%. 1000 milliLiter(s) (100 mL/Hr) IV Continuous <Continuous>  dextrose 50% Injectable 25 Gram(s) IV Push once  dextrose 50% Injectable 12.5 Gram(s) IV Push once  dextrose 50% Injectable 25 Gram(s) IV Push once  glucagon  Injectable 1 milliGRAM(s) IntraMuscular once  insulin glargine Injectable (LANTUS) 15 Unit(s) SubCutaneous at bedtime  insulin lispro (ADMELOG) corrective regimen sliding scale   SubCutaneous three times a day before meals  insulin lispro (ADMELOG) corrective regimen sliding scale   SubCutaneous at bedtime  mepolizumab Subcutaneous Injectable 100 milliGRAM(s) SubCutaneous every 4 weeks  metoprolol tartrate 50 milliGRAM(s) Oral two times a day  montelukast 10 milliGRAM(s) Oral daily  multivitamin 1 Tablet(s) Oral daily  Nephro-carlos 1 Tablet(s) Oral daily  pantoprazole  Injectable 40 milliGRAM(s) IV Push every 12 hours  saccharomyces boulardii 250 milliGRAM(s) Oral two times a day  tamsulosin 0.4 milliGRAM(s) Oral at bedtime  tiotropium 2.5 MICROgram(s) Inhaler 2 Puff(s) Inhalation daily    MEDICATIONS  (PRN):  albuterol/ipratropium for Nebulization 3 milliLiter(s) Nebulizer every 6 hours PRN Shortness of Breath and/or Wheezing  dextrose Oral Gel 15 Gram(s) Oral once PRN Blood Glucose LESS THAN 70 milliGRAM(s)/deciliter         Vitals log        ICU Vital Signs Last 24 Hrs  T(C): 37.2 (11 Mar 2023 05:11), Max: 37.2 (11 Mar 2023 05:11)  T(F): 98.9 (11 Mar 2023 05:11), Max: 98.9 (11 Mar 2023 05:11)  HR: 112 (11 Mar 2023 05:11) (92 - 112)  BP: 107/68 (11 Mar 2023 05:11) (101/59 - 107/68)  BP(mean): --  ABP: --  ABP(mean): --  RR: 19 (11 Mar 2023 05:11) (18 - 19)  SpO2: 98% (11 Mar 2023 05:11) (95% - 99%)    O2 Parameters below as of 11 Mar 2023 05:11  Patient On (Oxygen Delivery Method): BiPAP/CPAP                 Input and Output:  I&O's Detail    09 Mar 2023 07:01  -  10 Mar 2023 07:00  --------------------------------------------------------  IN:    IV PiggyBack: 1000 mL    Oral Fluid: 480 mL    Sodium Chloride 0.9% Bolus: 1000 mL  Total IN: 2480 mL    OUT:  Total OUT: 0 mL    Total NET: 2480 mL          Lab Data                        9.9    10.63 )-----------( 288      ( 10 Mar 2023 05:00 )             31.6     03-10    137  |  108  |  25<H>  ----------------------------<  235<H>  4.3   |  26  |  1.30    Ca    8.7      10 Mar 2023 05:00  Mg     2.0     03-09              Review of Systems	      Objective     Physical Examination    heart s1s2  lung dec BS  head nc      Pertinent Lab findings & Imaging      Eliecer:  NO   Adequate UO     I&O's Detail    09 Mar 2023 07:01  -  10 Mar 2023 07:00  --------------------------------------------------------  IN:    IV PiggyBack: 1000 mL    Oral Fluid: 480 mL    Sodium Chloride 0.9% Bolus: 1000 mL  Total IN: 2480 mL    OUT:  Total OUT: 0 mL    Total NET: 2480 mL               Discussed with:     Cultures:	        Radiology

## 2023-03-11 NOTE — PROGRESS NOTE ADULT - ASSESSMENT
anemia  GIB    Hgb noted  Monitor cbc  Transfuse prn  PPI for ppx  npo for  upper gastrointestinal endoscopy  add simethicone  Defer endoscopic workup for at least 48 hours from last dose  hold a/c  diet as tolerated  patient and wife unsure if they want endoscopic eval  D/w pt and wife  Will follow    I reviewed the overnight course of events on the unit, re-confirming the patient history. I discussed the care with the patient and their family  Differential diagnosis and plan of care discussed with patient after the evaluation  50 minutes spent on total encounter of which more than fifty percent of the encounter was spent counseling and/or coordinating care by the attending physician.  Advanced care planning was discussed with patient and family.  Advanced care planning forms were reviewed and discussed.  Risks, benefits and alternatives of gastroenterologic procedures were discussed in detail and all questions were answered.

## 2023-03-11 NOTE — PROGRESS NOTE ADULT - SUBJECTIVE AND OBJECTIVE BOX
INTERVAL HPI/OVERNIGHT EVENTS:  No new overnight event.  No N/V/D.  Tolerating diet.  no bm no melena  c/o gas    Allergies    [This allergen will not trigger allergy alert] IV DYE, IODINE CONTRAST (Unknown)  [This allergen will not trigger allergy alert] NKDA (Unknown)  latex (Unknown)    Intolerances    shellfish (Nausea)        General:  No wt loss, fevers, chills, night sweats, fatigue,   Eyes:  Good vision, no reported pain  ENT:  No sore throat, pain, runny nose, dysphagia  CV:  No pain, palpitations, hypo/hypertension  Resp:  No dyspnea, cough, tachypnea, wheezing  GI:  No pain, No nausea, No vomiting, No diarrhea, No constipation, No weight loss, No fever, No pruritis, No rectal bleeding, No tarry stools, No dysphagia,  :  No pain, bleeding, incontinence, nocturia  Muscle:  No pain, weakness  Neuro:  No weakness, tingling, memory problems  Psych:  No fatigue, insomnia, mood problems, depression  Endocrine:  No polyuria, polydipsia, cold/heat intolerance  Heme:  No petechiae, ecchymosis, easy bruisability  Skin:  No rash, tattoos, scars, edema      PHYSICAL EXAM:   Vital Signs:  Vital Signs Last 24 Hrs  T(C): 36.4 (12 Mar 2023 05:17), Max: 37.1 (11 Mar 2023 13:45)  T(F): 97.5 (12 Mar 2023 05:17), Max: 98.8 (11 Mar 2023 16:45)  HR: 80 (12 Mar 2023 05:55) (77 - 94)  BP: 108/67 (12 Mar 2023 05:17) (98/58 - 108/67)  BP(mean): --  RR: 17 (12 Mar 2023 05:17) (16 - 17)  SpO2: 98% (12 Mar 2023 05:55) (92% - 100%)    Parameters below as of 12 Mar 2023 05:17  Patient On (Oxygen Delivery Method): BiPAP/CPAP      Daily     Daily I&O's Summary    11 Mar 2023 06:01  -  12 Mar 2023 07:00  --------------------------------------------------------  IN: 838 mL / OUT: 700 mL / NET: 138 mL        GENERAL:  Appears stated age, well-groomed, well-nourished, no distress  HEENT:  NC/AT,  conjunctivae clear and pink, no thyromegaly, nodules, adenopathy, no JVD, sclera -anicteric  CHEST:  Full & symmetric excursion, no increased effort, breath sounds clear  HEART:  Regular rhythm, S1, S2, no murmur/rub/S3/S4, no abdominal bruit, no edema  ABDOMEN:  Soft, non-tender, non-distended, normoactive bowel sounds,  no masses ,no hepato-splenomegaly, no signs of chronic liver disease  EXTEREMITIES:  no cyanosis,clubbing or edema  SKIN:  No rash/erythema/ecchymoses/petechiae/wounds/abscess/warm/dry  NEURO:  Alert, oriented, no asterixis, no tremor, no encephalopathy      LABS:                        8.9    8.49  )-----------( 200      ( 12 Mar 2023 07:43 )             28.8     03-12    140  |  110<H>  |  23  ----------------------------<  152<H>  4.1   |  27  |  1.10    Ca    8.6      12 Mar 2023 07:43  Mg     1.8     03-11          amylase   lipase  RADIOLOGY & ADDITIONAL TESTS:

## 2023-03-11 NOTE — PROGRESS NOTE ADULT - ASSESSMENT
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     OM  COURTNEY  Asthma  COPD  HTN  CAD  CABG hx    home NIV device for COPD night time  vs noted  labs reviewed  GI and Surgery notes reviewed    NIV use night time and prn - 18/8 and 25 pct fio2  sleep hygiene reviewed  [facility]vs rx regimen  BP control  pt is on Nucala in the office - Monthly with Dr Oscar Marcelino - [facility]  on Symbicort - Spiriva - Singulair - copd - asthma -   NEBS PRN  monitor VS and Sat  keep sat > 88 pct  spoke with pt - wife  outpatient records reviewed  emp ABX in progress - ID follow up  skin - wound care

## 2023-03-11 NOTE — PROGRESS NOTE ADULT - SUBJECTIVE AND OBJECTIVE BOX
CHIEF COMPLAINT/INTERVAL HISTORY:  Pt. seen and evaluated for chronic osteomyelitis and GI bleed.  Pt. is in no distress.  Tolerating IV antibiotics.  Denies having any further bleeding.  Last bloody BM was two days ago.      REVIEW OF SYSTEMS:  No fever, CP, SOB, or abdominal pain    Vital Signs Last 24 Hrs  T(C): 37.2 (11 Mar 2023 05:11), Max: 37.2 (11 Mar 2023 05:11)  T(F): 98.9 (11 Mar 2023 05:11), Max: 98.9 (11 Mar 2023 05:11)  HR: 112 (11 Mar 2023 05:11) (92 - 112)  BP: 107/68 (11 Mar 2023 05:11) (101/59 - 107/68)  BP(mean): --  RR: 19 (11 Mar 2023 05:11) (18 - 19)  SpO2: 98% (11 Mar 2023 09:08) (95% - 98%)    Parameters below as of 11 Mar 2023 09:08  Patient On (Oxygen Delivery Method): room air        PHYSICAL EXAM:  GENERAL: NAD  HEENT: EOMI, hearing normal, conjunctiva and sclera clear  Chest: CTA bilaterally, no wheezing  CV: S1S2, RRR,   GI: soft, +BS, NT/ND  Musculoskeletal: trace LE edema, clean dressing over left lateral thigh  Psychiatric: affect nL, mood nL  Skin: warm and dry    LABS:                        7.9    10.08 )-----------( 244      ( 11 Mar 2023 07:12 )             25.1     03-11    139  |  109<H>  |  22  ----------------------------<  189<H>  4.1   |  25  |  1.10    Ca    8.7      11 Mar 2023 07:12  Mg     1.8     03-11

## 2023-03-11 NOTE — PROGRESS NOTE ADULT - ASSESSMENT
82 yr male with history of CAD previous CABG with 3 bypass on plavix , Hypertension, COPD  home oxygen dependent, long standing chronic left femoral osteomyelitis following traumatic fracture of  femur in 1966 , present with new drainage from left thigh chronic osteomyelitis .      Problem/Plan - 1:  ·  Problem: Chronic osteomyelitis.   ·  Plan:  Had abscess and drainage in November 2022  Had been variously treated with antibiotics   ID consulted  - Continue Cefepime 2 g iv q 12h  Left femur MRI: Chronic osteomyelitis with deformity of the mid to distal femur with increase in the osseous marrow edema and loss of normal T1 fatty marrow adjacent to the defect concerning for acute on chronic osteomyelitis.   Increase in size in the soft tissue abscess centered deep to the vastus intermedius measuring up to 22cm CC.  Wound care.   IR guided drain placement for abscess     Problem/Plan - 2:  ·  Problem: HTN (hypertension).   ·  Plan: Metoprolol 50 mg po bid with holding parameters     Problem/Plan - 3:  ·  Problem: DM (diabetes mellitus), type 2.   ·  Plan: Insulin sliding scale , on home Jardiance   Lantus 15 units SQ QHS and moderate lispro sliding scale, monitor FS.     Problem/Plan - 4:  ·  Problem: CAD (coronary artery disease).   ·  Plan: hold ASA  due to GIB   Atorvastatin 20 mg po daily.     Problem/Plan - 5:  ·  Problem: COPD, moderate.   ·  Plan: Inhaled broncho dilators , on home incruse and breo-formulary interchange   Singulair 10 mg po hs  not on home Prednisone, will d/c  on BIPAP at night   Xray: < from: Xray Chest 1 View- PORTABLE-Urgent (Xray Chest 1 View- PORTABLE-Urgent .) (03.07.23 @ 09:59) >    IMPRESSION: COPD left base scarring again noted.  pulmonary f/u     Problem/Plan - 6:  ·  Problem: History of atrial paroxysmal tachycardia.   ·  Plan:  Hold Apixaban 2.5 mg po bid given concern for GIB.  Continue metoprolol      Problem/Plan - 7:  ·  Problem: BPH with obstruction/lower urinary tract symptoms.   ·  Plan: Tamsulosin 0.4 mg po daily.    GI bleed and ABLA:  BRBPR on 3/9.  Hold Eliquis and ASA.  s/p 2 units PRBC transfusion.  Continue Protonix 40mg IV Q12h.  Will give additional PRBC transfusion today.  GI f/u    MERRILL:  s/p 2L NS hydration and 2units PRBC transfusion.  Renal indices improved.      VTE ppx:  SCD

## 2023-03-12 ENCOUNTER — TRANSCRIPTION ENCOUNTER (OUTPATIENT)
Age: 83
End: 2023-03-12

## 2023-03-12 LAB
ANION GAP SERPL CALC-SCNC: 3 MMOL/L — LOW (ref 5–17)
BASOPHILS # BLD AUTO: 0.02 K/UL — SIGNIFICANT CHANGE UP (ref 0–0.2)
BASOPHILS NFR BLD AUTO: 0.2 % — SIGNIFICANT CHANGE UP (ref 0–2)
BUN SERPL-MCNC: 23 MG/DL — SIGNIFICANT CHANGE UP (ref 7–23)
CALCIUM SERPL-MCNC: 8.6 MG/DL — SIGNIFICANT CHANGE UP (ref 8.5–10.1)
CHLORIDE SERPL-SCNC: 110 MMOL/L — HIGH (ref 96–108)
CO2 SERPL-SCNC: 27 MMOL/L — SIGNIFICANT CHANGE UP (ref 22–31)
CREAT SERPL-MCNC: 1.1 MG/DL — SIGNIFICANT CHANGE UP (ref 0.5–1.3)
CULTURE RESULTS: SIGNIFICANT CHANGE UP
EGFR: 67 ML/MIN/1.73M2 — SIGNIFICANT CHANGE UP
EOSINOPHIL # BLD AUTO: 0.04 K/UL — SIGNIFICANT CHANGE UP (ref 0–0.5)
EOSINOPHIL NFR BLD AUTO: 0.5 % — SIGNIFICANT CHANGE UP (ref 0–6)
GLUCOSE SERPL-MCNC: 152 MG/DL — HIGH (ref 70–99)
HCT VFR BLD CALC: 26.8 % — LOW (ref 39–50)
HCT VFR BLD CALC: 28.8 % — LOW (ref 39–50)
HGB BLD-MCNC: 8.2 G/DL — LOW (ref 13–17)
HGB BLD-MCNC: 8.9 G/DL — LOW (ref 13–17)
IMM GRANULOCYTES NFR BLD AUTO: 1.4 % — HIGH (ref 0–0.9)
LYMPHOCYTES # BLD AUTO: 1.14 K/UL — SIGNIFICANT CHANGE UP (ref 1–3.3)
LYMPHOCYTES # BLD AUTO: 13.4 % — SIGNIFICANT CHANGE UP (ref 13–44)
MCHC RBC-ENTMCNC: 29.1 PG — SIGNIFICANT CHANGE UP (ref 27–34)
MCHC RBC-ENTMCNC: 30.9 GM/DL — LOW (ref 32–36)
MCV RBC AUTO: 94.1 FL — SIGNIFICANT CHANGE UP (ref 80–100)
MONOCYTES # BLD AUTO: 0.88 K/UL — SIGNIFICANT CHANGE UP (ref 0–0.9)
MONOCYTES NFR BLD AUTO: 10.4 % — SIGNIFICANT CHANGE UP (ref 2–14)
NEUTROPHILS # BLD AUTO: 6.29 K/UL — SIGNIFICANT CHANGE UP (ref 1.8–7.4)
NEUTROPHILS NFR BLD AUTO: 74.1 % — SIGNIFICANT CHANGE UP (ref 43–77)
NRBC # BLD: 0 /100 WBCS — SIGNIFICANT CHANGE UP (ref 0–0)
PLATELET # BLD AUTO: 200 K/UL — SIGNIFICANT CHANGE UP (ref 150–400)
POTASSIUM SERPL-MCNC: 4.1 MMOL/L — SIGNIFICANT CHANGE UP (ref 3.5–5.3)
POTASSIUM SERPL-SCNC: 4.1 MMOL/L — SIGNIFICANT CHANGE UP (ref 3.5–5.3)
RBC # BLD: 3.06 M/UL — LOW (ref 4.2–5.8)
RBC # FLD: 14.6 % — HIGH (ref 10.3–14.5)
SARS-COV-2 RNA SPEC QL NAA+PROBE: SIGNIFICANT CHANGE UP
SODIUM SERPL-SCNC: 140 MMOL/L — SIGNIFICANT CHANGE UP (ref 135–145)
SPECIMEN SOURCE: SIGNIFICANT CHANGE UP
WBC # BLD: 8.49 K/UL — SIGNIFICANT CHANGE UP (ref 3.8–10.5)
WBC # FLD AUTO: 8.49 K/UL — SIGNIFICANT CHANGE UP (ref 3.8–10.5)

## 2023-03-12 PROCEDURE — 99233 SBSQ HOSP IP/OBS HIGH 50: CPT

## 2023-03-12 RX ORDER — SIMETHICONE 80 MG/1
80 TABLET, CHEWABLE ORAL EVERY 6 HOURS
Refills: 0 | Status: DISCONTINUED | OUTPATIENT
Start: 2023-03-12 | End: 2023-03-22

## 2023-03-12 RX ORDER — FUROSEMIDE 40 MG
20 TABLET ORAL DAILY
Refills: 0 | Status: DISCONTINUED | OUTPATIENT
Start: 2023-03-12 | End: 2023-03-14

## 2023-03-12 RX ORDER — INSULIN LISPRO 100/ML
VIAL (ML) SUBCUTANEOUS EVERY 6 HOURS
Refills: 0 | Status: DISCONTINUED | OUTPATIENT
Start: 2023-03-12 | End: 2023-03-13

## 2023-03-12 RX ADMIN — BUDESONIDE AND FORMOTEROL FUMARATE DIHYDRATE 2 PUFF(S): 160; 4.5 AEROSOL RESPIRATORY (INHALATION) at 05:51

## 2023-03-12 RX ADMIN — Medication 1 TABLET(S): at 12:38

## 2023-03-12 RX ADMIN — MONTELUKAST 10 MILLIGRAM(S): 4 TABLET, CHEWABLE ORAL at 12:38

## 2023-03-12 RX ADMIN — Medication 20 MILLIGRAM(S): at 12:37

## 2023-03-12 RX ADMIN — SIMETHICONE 80 MILLIGRAM(S): 80 TABLET, CHEWABLE ORAL at 18:21

## 2023-03-12 RX ADMIN — CEFEPIME 100 MILLIGRAM(S): 1 INJECTION, POWDER, FOR SOLUTION INTRAMUSCULAR; INTRAVENOUS at 18:21

## 2023-03-12 RX ADMIN — PANTOPRAZOLE SODIUM 40 MILLIGRAM(S): 20 TABLET, DELAYED RELEASE ORAL at 05:50

## 2023-03-12 RX ADMIN — BUDESONIDE AND FORMOTEROL FUMARATE DIHYDRATE 2 PUFF(S): 160; 4.5 AEROSOL RESPIRATORY (INHALATION) at 19:15

## 2023-03-12 RX ADMIN — ATORVASTATIN CALCIUM 20 MILLIGRAM(S): 80 TABLET, FILM COATED ORAL at 21:27

## 2023-03-12 RX ADMIN — Medication 1 TABLET(S): at 12:37

## 2023-03-12 RX ADMIN — PANTOPRAZOLE SODIUM 40 MILLIGRAM(S): 20 TABLET, DELAYED RELEASE ORAL at 18:21

## 2023-03-12 RX ADMIN — Medication 500 MILLIGRAM(S): at 12:51

## 2023-03-12 RX ADMIN — TIOTROPIUM BROMIDE 2 PUFF(S): 18 CAPSULE ORAL; RESPIRATORY (INHALATION) at 05:51

## 2023-03-12 RX ADMIN — CEFEPIME 100 MILLIGRAM(S): 1 INJECTION, POWDER, FOR SOLUTION INTRAMUSCULAR; INTRAVENOUS at 05:50

## 2023-03-12 RX ADMIN — Medication 250 MILLIGRAM(S): at 05:50

## 2023-03-12 RX ADMIN — Medication 400 UNIT(S): at 12:38

## 2023-03-12 RX ADMIN — Medication 250 MILLIGRAM(S): at 18:21

## 2023-03-12 RX ADMIN — Medication 4: at 16:33

## 2023-03-12 RX ADMIN — TAMSULOSIN HYDROCHLORIDE 0.4 MILLIGRAM(S): 0.4 CAPSULE ORAL at 21:27

## 2023-03-12 RX ADMIN — Medication 50 MILLIGRAM(S): at 05:50

## 2023-03-12 RX ADMIN — Medication 8: at 12:43

## 2023-03-12 RX ADMIN — INSULIN GLARGINE 15 UNIT(S): 100 INJECTION, SOLUTION SUBCUTANEOUS at 21:27

## 2023-03-12 RX ADMIN — PREGABALIN 1000 MICROGRAM(S): 225 CAPSULE ORAL at 12:37

## 2023-03-12 NOTE — PROGRESS NOTE ADULT - SUBJECTIVE AND OBJECTIVE BOX
Date/Time Patient Seen:  		  Referring MD:   Data Reviewed	       Patient is a 82y old  Male who presents with a chief complaint of Left  thigh draining sinus (11 Mar 2023 12:37)      Subjective/HPI     PAST MEDICAL & SURGICAL HISTORY:  Diabetes Mellitus, Type II    CAD (Coronary Artery Disease)  s/p 3v CABG 2004; stents placed in [place] in 2019    Dyslipidemia    Asymptomatic Peripheral Vascular Disease    Osteomyelitis    COPD (chronic obstructive pulmonary disease)  on 2L at home and BiPAP at night; intubated 6/18    Diabetes mellitus    Hypertension    PVD (peripheral vascular disease)    History of PAT (paroxysmal atrial tachycardia)    Asthma with COPD    BPH (benign prostatic hyperplasia)    Acute osteomyelitis    CABG (Coronary Artery Bypass Graft)  2004    Compound fracture  left leg    S/P primary angioplasty with coronary stent    H/O drainage of abscess  Left femur 12/2021          Medication list         MEDICATIONS  (STANDING):  ascorbic acid 500 milliGRAM(s) Oral daily  atorvastatin 20 milliGRAM(s) Oral at bedtime  budesonide 160 MICROgram(s)/formoterol 4.5 MICROgram(s) Inhaler 2 Puff(s) Inhalation two times a day  cefepime   IVPB      cefepime   IVPB 2000 milliGRAM(s) IV Intermittent every 12 hours  cholecalciferol 400 Unit(s) Oral daily  cyanocobalamin 1000 MICROGram(s) Oral daily  dextrose 5%. 1000 milliLiter(s) (50 mL/Hr) IV Continuous <Continuous>  dextrose 5%. 1000 milliLiter(s) (100 mL/Hr) IV Continuous <Continuous>  dextrose 50% Injectable 25 Gram(s) IV Push once  dextrose 50% Injectable 12.5 Gram(s) IV Push once  dextrose 50% Injectable 25 Gram(s) IV Push once  glucagon  Injectable 1 milliGRAM(s) IntraMuscular once  insulin glargine Injectable (LANTUS) 15 Unit(s) SubCutaneous at bedtime  insulin lispro (ADMELOG) corrective regimen sliding scale   SubCutaneous three times a day before meals  insulin lispro (ADMELOG) corrective regimen sliding scale   SubCutaneous at bedtime  mepolizumab Subcutaneous Injectable 100 milliGRAM(s) SubCutaneous every 4 weeks  metoprolol tartrate 50 milliGRAM(s) Oral two times a day  montelukast 10 milliGRAM(s) Oral daily  multivitamin 1 Tablet(s) Oral daily  Nephro-carlos 1 Tablet(s) Oral daily  pantoprazole  Injectable 40 milliGRAM(s) IV Push every 12 hours  saccharomyces boulardii 250 milliGRAM(s) Oral two times a day  tamsulosin 0.4 milliGRAM(s) Oral at bedtime  tiotropium 2.5 MICROgram(s) Inhaler 2 Puff(s) Inhalation daily    MEDICATIONS  (PRN):  albuterol/ipratropium for Nebulization 3 milliLiter(s) Nebulizer every 6 hours PRN Shortness of Breath and/or Wheezing  dextrose Oral Gel 15 Gram(s) Oral once PRN Blood Glucose LESS THAN 70 milliGRAM(s)/deciliter         Vitals log        ICU Vital Signs Last 24 Hrs  T(C): 36.4 (12 Mar 2023 05:17), Max: 37.1 (11 Mar 2023 13:45)  T(F): 97.5 (12 Mar 2023 05:17), Max: 98.8 (11 Mar 2023 16:45)  HR: 87 (12 Mar 2023 05:17) (77 - 100)  BP: 108/67 (12 Mar 2023 05:17) (92/57 - 108/67)  BP(mean): --  ABP: --  ABP(mean): --  RR: 17 (12 Mar 2023 05:17) (16 - 17)  SpO2: 92% (12 Mar 2023 05:17) (92% - 100%)    O2 Parameters below as of 12 Mar 2023 05:17  Patient On (Oxygen Delivery Method): BiPAP/CPAP                 Input and Output:  I&O's Detail    11 Mar 2023 06:01  -  12 Mar 2023 06:30  --------------------------------------------------------  IN:    PRBCs (Packed Red Blood Cells): 548 mL  Total IN: 548 mL    OUT:  Total OUT: 0 mL    Total NET: 548 mL          Lab Data                        9.8    x     )-----------( x        ( 11 Mar 2023 17:50 )             30.4     03-11    139  |  109<H>  |  22  ----------------------------<  189<H>  4.1   |  25  |  1.10    Ca    8.7      11 Mar 2023 07:12  Mg     1.8     03-11              Review of Systems	      Objective     Physical Examination    heart s1s2  lung dec BS      Pertinent Lab findings & Imaging      Eliecer:  NO   Adequate UO     I&O's Detail    11 Mar 2023 06:01  -  12 Mar 2023 06:30  --------------------------------------------------------  IN:    PRBCs (Packed Red Blood Cells): 548 mL  Total IN: 548 mL    OUT:  Total OUT: 0 mL    Total NET: 548 mL               Discussed with:     Cultures:	        Radiology

## 2023-03-12 NOTE — PROGRESS NOTE ADULT - ASSESSMENT
82 yr male with history of CAD previous CABG with 3 bypass on plavix , Hypertension, COPD  home oxygen dependent, long standing chronic left femoral osteomyelitis following traumatic fracture of  femur in 1966 , present with new drainage from left thigh chronic osteomyelitis .      Problem/Plan - 1:  ·  Problem: Chronic osteomyelitis.   ·  Plan:  Had abscess and drainage in November 2022  Had been variously treated with antibiotics   ID consulted  - Continue Cefepime 2 g iv q 12h  Left femur MRI: Chronic osteomyelitis with deformity of the mid to distal femur with increase in the osseous marrow edema and loss of normal T1 fatty marrow adjacent to the defect concerning for acute on chronic osteomyelitis.   Increase in size in the soft tissue abscess centered deep to the vastus intermedius measuring up to 22cm CC.  Wound care.   IR guided drain placement for abscess     Problem/Plan - 2:  ·  Problem: HTN (hypertension).   ·  Plan: Metoprolol 50 mg po bid with holding parameters     Problem/Plan - 3:  ·  Problem: DM (diabetes mellitus), type 2.   ·  Plan: Insulin sliding scale , on home Jardiance   Lantus 15 units SQ QHS and moderate lispro sliding scale, monitor FS.     Problem/Plan - 4:  ·  Problem: CAD (coronary artery disease).   ·  Plan: hold ASA  due to GIB   Atorvastatin 20 mg po daily.     Problem/Plan - 5:  ·  Problem: COPD, moderate.   ·  Plan: Inhaled broncho dilators , on home incruse and breo-formulary interchange   Singulair 10 mg po hs  not on home Prednisone, will d/c  on BIPAP at night   Xray: < from: Xray Chest 1 View- PORTABLE-Urgent (Xray Chest 1 View- PORTABLE-Urgent .) (03.07.23 @ 09:59) >    IMPRESSION: COPD left base scarring again noted.  pulmonary f/u     Problem/Plan - 6:  ·  Problem: History of atrial paroxysmal tachycardia.   ·  Plan:  Hold Apixaban 2.5 mg po bid given concern for GIB.  Continue metoprolol      Problem/Plan - 7:  ·  Problem: BPH with obstruction/lower urinary tract symptoms.   ·  Plan: Tamsulosin 0.4 mg po daily.    GI bleed and ABLA:  BRBPR on 3/9 and 3/11.  Holding Eliquis and ASA.  s/p total 4 units PRBC transfusion.  Continue Protonix 40mg IV Q12h.  Pt. scheduled for endoscopic evaluation tomorrow. GI f/u  Repeat H/H@2PM.  Pt. is without s/s of ACS, decompensated CHF, or unstable arrhythmia.  Pt. can achieve METS>4.  No absolute contraindication from medical perspective to proceed with endoscopic evaluation.      MERRILL:  s/p 2L NS hydration and PRBC transfusions.  Renal indices improved.      VTE ppx:  SCD                       [No Acute Distress] : no acute distress [Well Nourished] : well nourished [Well Developed] : well developed [Well-Appearing] : well-appearing [Normal Sclera/Conjunctiva] : normal sclera/conjunctiva [PERRL] : pupils equal round and reactive to light [EOMI] : extraocular movements intact [Normal Outer Ear/Nose] : the outer ears and nose were normal in appearance [Normal Oropharynx] : the oropharynx was normal [No JVD] : no jugular venous distention [No Lymphadenopathy] : no lymphadenopathy [Supple] : supple [Thyroid Normal, No Nodules] : the thyroid was normal and there were no nodules present [No Respiratory Distress] : no respiratory distress  [No Accessory Muscle Use] : no accessory muscle use [Clear to Auscultation] : lungs were clear to auscultation bilaterally [Normal Rate] : normal rate  [Regular Rhythm] : with a regular rhythm [Normal S1, S2] : normal S1 and S2 [No Murmur] : no murmur heard [No Carotid Bruits] : no carotid bruits [No Abdominal Bruit] : a ~M bruit was not heard ~T in the abdomen [No Varicosities] : no varicosities [Pedal Pulses Present] : the pedal pulses are present [No Edema] : there was no peripheral edema [No Palpable Aorta] : no palpable aorta [No Extremity Clubbing/Cyanosis] : no extremity clubbing/cyanosis [Soft] : abdomen soft [Non Tender] : non-tender [Non-distended] : non-distended [No Masses] : no abdominal mass palpated [No HSM] : no HSM [Normal Bowel Sounds] : normal bowel sounds [Normal Posterior Cervical Nodes] : no posterior cervical lymphadenopathy [Normal Anterior Cervical Nodes] : no anterior cervical lymphadenopathy [No CVA Tenderness] : no CVA  tenderness [No Spinal Tenderness] : no spinal tenderness [No Joint Swelling] : no joint swelling [Grossly Normal Strength/Tone] : grossly normal strength/tone [No Rash] : no rash [Coordination Grossly Intact] : coordination grossly intact [No Focal Deficits] : no focal deficits [Normal Gait] : normal gait [Deep Tendon Reflexes (DTR)] : deep tendon reflexes were 2+ and symmetric [Normal Affect] : the affect was normal [Normal Insight/Judgement] : insight and judgment were intact

## 2023-03-12 NOTE — PROGRESS NOTE ADULT - ASSESSMENT
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     OM  COURTNEY  Asthma  COPD  HTN  CAD  CABG hx    home NIV device for COPD night time  vs noted  labs reviewed  GI and Surgery notes reviewed  Cardio eval noted    NIV use night time and prn - 18/8 and 25 pct fio2  sleep hygiene reviewed  [facility]vs rx regimen  BP control  pt is on Nucala in the office - Monthly with Dr Oscar Marcelino - [facility]  on Symbicort - Spiriva - Singulair - copd - asthma -   NEBS PRN  monitor VS and Sat  keep sat > 88 pct  spoke with pt - wife  outpatient records reviewed  emp ABX in progress - ID follow up  skin - wound care

## 2023-03-12 NOTE — PROGRESS NOTE ADULT - SUBJECTIVE AND OBJECTIVE BOX
CHIEF COMPLAINT/INTERVAL HISTORY:  Pt. seen and evaluated for chronic osteomyelitis and GI bleed.  Pt. had episode of GI bleed yesterday morning.  Received 2 units PRBC yesterday.  Currently is in no distress.  Denies having any further bleeding since yesterday.      REVIEW OF SYSTEMS:  No fever, CP, SOB, or abdominal pain    Vital Signs Last 24 Hrs  T(C): 36.4 (12 Mar 2023 05:17), Max: 37.1 (11 Mar 2023 13:45)  T(F): 97.5 (12 Mar 2023 05:17), Max: 98.8 (11 Mar 2023 16:45)  HR: 80 (12 Mar 2023 05:55) (77 - 100)  BP: 108/67 (12 Mar 2023 05:17) (92/57 - 108/67)  BP(mean): --  RR: 17 (12 Mar 2023 05:17) (16 - 17)  SpO2: 98% (12 Mar 2023 05:55) (92% - 100%)    Parameters below as of 12 Mar 2023 05:17  Patient On (Oxygen Delivery Method): BiPAP/CPAP        PHYSICAL EXAM:  GENERAL: NAD  HEENT: EOMI, hearing normal, conjunctiva and sclera clear  Chest: CTA bilaterally, no wheezing  CV: S1S2, RRR,   GI: soft, +BS, NT/ND  Musculoskeletal: no LE edema, dressing over left lateral thigh  Psychiatric: affect nL, mood nL  Skin: warm and dry    LABS:                        8.9    8.49  )-----------( 200      ( 12 Mar 2023 07:43 )             28.8     03-12    140  |  110<H>  |  23  ----------------------------<  152<H>  4.1   |  27  |  1.10    Ca    8.6      12 Mar 2023 07:43  Mg     1.8     03-11

## 2023-03-12 NOTE — PROGRESS NOTE ADULT - SUBJECTIVE AND OBJECTIVE BOX
[facility] -- Génesis Villavicencio Pannella, Patel, Carolina Heath: Office # [phone]    Follow Up:  Cardiac clearance, GI bleed    Subjective/Observations: Patient seen and examined. Patient awake, alert, resting in bed. No complaints of chest pain, dyspnea, palpitations or dizziness. No signs of orthopnea or PND. Tolerating O2 via nasal cannula. + LA    REVIEW OF SYSTEMS: All other review of systems are negative unless indicated above    PAST MEDICAL & SURGICAL HISTORY:  Diabetes Mellitus, Type II      CAD (Coronary Artery Disease)  s/p 3v CABG 2004; stents placed in [place] in 2019      Dyslipidemia      Osteomyelitis      COPD (chronic obstructive pulmonary disease)  on 2L at home and BiPAP at night; intubated 6/18      Hypertension      Hypertension      PVD (peripheral vascular disease)      History of PAT (paroxysmal atrial tachycardia)      Asthma with COPD      BPH (benign prostatic hyperplasia)      Acute osteomyelitis      CABG (Coronary Artery Bypass Graft)  2004      Compound fracture  left leg      S/P primary angioplasty with coronary stent      H/O drainage of abscess  Left femur 12/2021    MEDICATIONS  (STANDING):  ascorbic acid 500 milliGRAM(s) Oral daily  atorvastatin 20 milliGRAM(s) Oral at bedtime  budesonide 160 MICROgram(s)/formoterol 4.5 MICROgram(s) Inhaler 2 Puff(s) Inhalation two times a day  cefepime   IVPB      cefepime   IVPB 2000 milliGRAM(s) IV Intermittent every 12 hours  cholecalciferol 400 Unit(s) Oral daily  cyanocobalamin 1000 MICROGram(s) Oral daily  dextrose 5%. 1000 milliLiter(s) (50 mL/Hr) IV Continuous <Continuous>  dextrose 5%. 1000 milliLiter(s) (100 mL/Hr) IV Continuous <Continuous>  dextrose 50% Injectable 25 Gram(s) IV Push once  dextrose 50% Injectable 12.5 Gram(s) IV Push once  dextrose 50% Injectable 25 Gram(s) IV Push once  furosemide    Tablet 20 milliGRAM(s) Oral daily  glucagon  Injectable 1 milliGRAM(s) IntraMuscular once  insulin glargine Injectable (LANTUS) 15 Unit(s) SubCutaneous at bedtime  insulin lispro (ADMELOG) corrective regimen sliding scale   SubCutaneous at bedtime  insulin lispro (ADMELOG) corrective regimen sliding scale   SubCutaneous three times a day before meals  mepolizumab Subcutaneous Injectable 100 milliGRAM(s) SubCutaneous every 4 weeks  metoprolol tartrate 50 milliGRAM(s) Oral two times a day  montelukast 10 milliGRAM(s) Oral daily  multivitamin 1 Tablet(s) Oral daily  Nephro-carlos 1 Tablet(s) Oral daily  pantoprazole  Injectable 40 milliGRAM(s) IV Push every 12 hours  saccharomyces boulardii 250 milliGRAM(s) Oral two times a day  tamsulosin 0.4 milliGRAM(s) Oral at bedtime  tiotropium 2.5 MICROgram(s) Inhaler 2 Puff(s) Inhalation daily    MEDICATIONS  (PRN):  albuterol/ipratropium for Nebulization 3 milliLiter(s) Nebulizer every 6 hours PRN Shortness of Breath and/or Wheezing  dextrose Oral Gel 15 Gram(s) Oral once PRN Blood Glucose LESS THAN 70 milliGRAM(s)/deciliter    Allergies    [This allergen will not trigger allergy alert] IV DYE, IODINE CONTRAST (Unknown)  [This allergen will not trigger allergy alert] NKDA (Unknown)  latex (Unknown)    Intolerances    shellfish (Nausea)    Vital Signs Last 24 Hrs  T(C): 36.4 (12 Mar 2023 05:17), Max: 37.1 (11 Mar 2023 13:45)  T(F): 97.5 (12 Mar 2023 05:17), Max: 98.8 (11 Mar 2023 16:45)  HR: 80 (12 Mar 2023 05:55) (77 - 100)  BP: 108/67 (12 Mar 2023 05:17) (92/57 - 108/67)  BP(mean): --  RR: 17 (12 Mar 2023 05:17) (16 - 17)  SpO2: 98% (12 Mar 2023 05:55) (92% - 100%)    Parameters below as of 12 Mar 2023 05:17  Patient On (Oxygen Delivery Method): BiPAP/CPAP      I&O's Summary    11 Mar 2023 06:01  -  12 Mar 2023 07:00  --------------------------------------------------------  IN: 838 mL / OUT: 700 mL / NET: 138 mL          TELE: SR 70-80s   PHYSICAL EXAM:  Constitutional: NAD, awake and alert, Obese  HEENT: Moist Mucous Membranes, Anicteric  Pulmonary: Non-labored, breath sounds are clear bilaterally, No wheezing, fine  rales at base  Cardiovascular: Regular, S1 and S2, No murmurs, No rubs, gallops or clicks  Gastrointestinal: Bowel Sounds present, soft, nontender.   Lymph: No peripheral edema. No lymphadenopathy.  Skin: No visible rashes or ulcers.  Psych:  Mood & affect appropriate      LABS: All Labs Reviewed:                        8.9    8.49  )-----------( 200      ( 12 Mar 2023 07:43 )             28.8                         9.8    x     )-----------( x        ( 11 Mar 2023 17:50 )             30.4                         7.9    10.08 )-----------( 244      ( 11 Mar 2023 07:12 )             25.1     12 Mar 2023 07:43    140    |  110    |  23     ----------------------------<  152    4.1     |  27     |  1.10   11 Mar 2023 07:12    139    |  109    |  22     ----------------------------<  189    4.1     |  25     |  1.10   10 Mar 2023 05:00    137    |  108    |  25     ----------------------------<  235    4.3     |  26     |  1.30     Ca    8.6        12 Mar 2023 07:43  Ca    8.7        11 Mar 2023 07:12  Ca    8.7        10 Mar 2023 05:00  Mg     1.8       11 Mar 2023 07:12         12 Lead ECG:   Ventricular Rate 93 BPM    Atrial Rate 93 BPM    P-R Interval 162 ms    QRS Duration 86 ms    Q-T Interval 354 ms    QTC Calculation(Bazett) 440 ms    P Axis 82 degrees    R Axis 83 degrees    T Axis 68 degrees    Diagnosis Line Normal sinus rhythm  Normal ECG  When compared with ECG of 27-NOV-2022 08:19,  No significant change was found  Confirmed by deep Heath ([id number]) on 3/7/2023 2:22:00 PM (03-06-23 @ 19:32)       EXAM:  ECHO TTE WO CON COMP W DOPP         PROCEDURE DATE:  12/20/2021        INTERPRETATION:  INDICATION: Ischemic heart disease  sonographer KL    Blood Pressure 123/70    Height 180.3 cm     Weight 97.5 kg       BSA 2.2   sq m    Dimensions:  LA 3.0       Normal Values: 2.0 - 4.0 cm  Ao 3.5        Normal Values: 2.0 - 3.8 cm  SEPTUM 1.3       Normal Values: 0.6 - 1.2 cm  PWT 1.0       Normal Values: 0.6 - 1.1 cm  LVIDd 4.3         Normal Values: 3.0 - 5.6 cm  LVIDs 1.8         Normal Values: 1.8 - 4.0 cm      OBSERVATIONS:  Technically difficult and limited study  Mitral Valve: normal, trace physiologic MR.  Aortic Valve/Aorta: Sclerotic trileaflet aortic valve with normal opening.  Tricuspid Valve: Not well-visualized  Pulmonic Valve: Not well-visualized  Left Atrium: normal  Right Atrium: Not well-visualized  Left Ventricle: The left ventricular endocardium is not well-visualized.   Overall normal systolic function with an estimated LVEF of 55%. Cannot   evaluate for segmental abnormalities  Right Ventricle: Not well-visualized  Pericardium: no significant pericardial effusion.  Pulmonary/RV Pressure: estimated PA systolic pressure of 28 mmHg  LV diastolic dysfunction is present        IMPRESSION:  Technically difficult and limited study  The left ventricular endocardium is not well-visualized. Overall normal   systolic function with an estimated LVEF of 55-60%. Cannot evaluate for   segmental abnormalities  The right ventricle is not well-visualized  Sclerotic trileaflet aortic valve, without AI.  Trace physiologic MR  No significant pericardial effusion.    --- End of Report ---    ARISTIDES LEE MD; Attending Cardiologist  This document has been electronically signed. Dec 21 2021  1:38PM

## 2023-03-12 NOTE — PROGRESS NOTE ADULT - ASSESSMENT
82 yr male with history of Hypertension, COPD home oxygen dependent, CAD CABG , chronic left femor osteomyelitis since traumatic left femoral fracture in 1966 presents with new yellowish  drainage from left thigh. Cardiology consulted for clearance for GI scopy.     Cardiac clearance HTN, CAD, CABG  - known CAD, CABG  - EKG showed SR @ 93.   - No evidence of any active ischemia  - Holding aspirin 2/2 GI bleed  - Continue statin and BB    - H/o atrial paroxysmal tachycardia.   - Holding Apixaban 2.5 mg po bid given concern for GIB  - Tele with SR 70-80s     - Previous TTE 12/21 showed overall normal systolic function with an estimated LVEF of 55-60%.   - Pt sees Dr Breaux for cardiology. Had recent echo cardiogram. Please obtain results   - Pt is on Lasix 20 mg PO daily at home which is on hold due to MERRILL. Creatinine: 1.10  - Resume home Lasix     - BP stable and controlled    - GI consulted for BRBPR. S/p PRBCs   - Plan for GI scopy.   - Pt has no active ischemia, decompensated heart failure, unstable arrythmia, or severe stenotic valvular disease. Patient is optimized from cardiovascular standpoint to proceed with planned procedure with routine hemodynamic monitoring.     - ID following and is on antibiotics for chr osteomyelitis.     - SCDs for DVT prophylaxis   - Monitor and replete lytes, keep K>4, Mg>2.  - Will continue to follow.    Elizabeth Negron, MS FNP, [facility]  Nurse Practitioner- Cardiology   Spectra #[phone]

## 2023-03-13 LAB
ANION GAP SERPL CALC-SCNC: 4 MMOL/L — LOW (ref 5–17)
APTT BLD: 29 SEC — SIGNIFICANT CHANGE UP (ref 27.5–35.5)
BASOPHILS # BLD AUTO: 0.02 K/UL — SIGNIFICANT CHANGE UP (ref 0–0.2)
BASOPHILS NFR BLD AUTO: 0.3 % — SIGNIFICANT CHANGE UP (ref 0–2)
BUN SERPL-MCNC: 22 MG/DL — SIGNIFICANT CHANGE UP (ref 7–23)
CALCIUM SERPL-MCNC: 9 MG/DL — SIGNIFICANT CHANGE UP (ref 8.5–10.1)
CHLORIDE SERPL-SCNC: 109 MMOL/L — HIGH (ref 96–108)
CO2 SERPL-SCNC: 27 MMOL/L — SIGNIFICANT CHANGE UP (ref 22–31)
CREAT SERPL-MCNC: 1 MG/DL — SIGNIFICANT CHANGE UP (ref 0.5–1.3)
EGFR: 75 ML/MIN/1.73M2 — SIGNIFICANT CHANGE UP
EOSINOPHIL # BLD AUTO: 0.04 K/UL — SIGNIFICANT CHANGE UP (ref 0–0.5)
EOSINOPHIL NFR BLD AUTO: 0.5 % — SIGNIFICANT CHANGE UP (ref 0–6)
GLUCOSE SERPL-MCNC: 153 MG/DL — HIGH (ref 70–99)
HCT VFR BLD CALC: 24.7 % — LOW (ref 39–50)
HGB BLD-MCNC: 7.7 G/DL — LOW (ref 13–17)
IMM GRANULOCYTES NFR BLD AUTO: 1.5 % — HIGH (ref 0–0.9)
INR BLD: 1.03 RATIO — SIGNIFICANT CHANGE UP (ref 0.88–1.16)
LYMPHOCYTES # BLD AUTO: 1.32 K/UL — SIGNIFICANT CHANGE UP (ref 1–3.3)
LYMPHOCYTES # BLD AUTO: 16.5 % — SIGNIFICANT CHANGE UP (ref 13–44)
MAGNESIUM SERPL-MCNC: 1.8 MG/DL — SIGNIFICANT CHANGE UP (ref 1.6–2.6)
MCHC RBC-ENTMCNC: 29.1 PG — SIGNIFICANT CHANGE UP (ref 27–34)
MCHC RBC-ENTMCNC: 31.2 GM/DL — LOW (ref 32–36)
MCV RBC AUTO: 93.2 FL — SIGNIFICANT CHANGE UP (ref 80–100)
MONOCYTES # BLD AUTO: 0.77 K/UL — SIGNIFICANT CHANGE UP (ref 0–0.9)
MONOCYTES NFR BLD AUTO: 9.6 % — SIGNIFICANT CHANGE UP (ref 2–14)
NEUTROPHILS # BLD AUTO: 5.72 K/UL — SIGNIFICANT CHANGE UP (ref 1.8–7.4)
NEUTROPHILS NFR BLD AUTO: 71.6 % — SIGNIFICANT CHANGE UP (ref 43–77)
NRBC # BLD: 0 /100 WBCS — SIGNIFICANT CHANGE UP (ref 0–0)
PLATELET # BLD AUTO: 228 K/UL — SIGNIFICANT CHANGE UP (ref 150–400)
POTASSIUM SERPL-MCNC: 3.8 MMOL/L — SIGNIFICANT CHANGE UP (ref 3.5–5.3)
POTASSIUM SERPL-SCNC: 3.8 MMOL/L — SIGNIFICANT CHANGE UP (ref 3.5–5.3)
PROTHROM AB SERPL-ACNC: 12 SEC — SIGNIFICANT CHANGE UP (ref 10.5–13.4)
RBC # BLD: 2.65 M/UL — LOW (ref 4.2–5.8)
RBC # FLD: 14.9 % — HIGH (ref 10.3–14.5)
SODIUM SERPL-SCNC: 140 MMOL/L — SIGNIFICANT CHANGE UP (ref 135–145)
WBC # BLD: 7.99 K/UL — SIGNIFICANT CHANGE UP (ref 3.8–10.5)
WBC # FLD AUTO: 7.99 K/UL — SIGNIFICANT CHANGE UP (ref 3.8–10.5)

## 2023-03-13 PROCEDURE — 88305 TISSUE EXAM BY PATHOLOGIST: CPT | Mod: 26

## 2023-03-13 PROCEDURE — 99232 SBSQ HOSP IP/OBS MODERATE 35: CPT

## 2023-03-13 PROCEDURE — 88312 SPECIAL STAINS GROUP 1: CPT | Mod: 26

## 2023-03-13 PROCEDURE — 99233 SBSQ HOSP IP/OBS HIGH 50: CPT

## 2023-03-13 PROCEDURE — 88342 IMHCHEM/IMCYTCHM 1ST ANTB: CPT | Mod: 26

## 2023-03-13 PROCEDURE — 88313 SPECIAL STAINS GROUP 2: CPT | Mod: 26

## 2023-03-13 DEVICE — ESOPHAGEAL BALLOON CATH CRE FIXED WIRE 6-7-8MM: Type: IMPLANTABLE DEVICE | Status: FUNCTIONAL

## 2023-03-13 DEVICE — HEMOSPRAY HEMOSTAT ENDO 7F: Type: IMPLANTABLE DEVICE | Status: FUNCTIONAL

## 2023-03-13 RX ORDER — INSULIN LISPRO 100/ML
VIAL (ML) SUBCUTANEOUS
Refills: 0 | Status: DISCONTINUED | OUTPATIENT
Start: 2023-03-13 | End: 2023-03-17

## 2023-03-13 RX ORDER — SUCRALFATE 1 G
1 TABLET ORAL
Refills: 0 | Status: DISCONTINUED | OUTPATIENT
Start: 2023-03-13 | End: 2023-03-22

## 2023-03-13 RX ADMIN — BUDESONIDE AND FORMOTEROL FUMARATE DIHYDRATE 2 PUFF(S): 160; 4.5 AEROSOL RESPIRATORY (INHALATION) at 06:18

## 2023-03-13 RX ADMIN — BUDESONIDE AND FORMOTEROL FUMARATE DIHYDRATE 2 PUFF(S): 160; 4.5 AEROSOL RESPIRATORY (INHALATION) at 18:03

## 2023-03-13 RX ADMIN — Medication 250 MILLIGRAM(S): at 17:17

## 2023-03-13 RX ADMIN — Medication 50 MILLIGRAM(S): at 06:19

## 2023-03-13 RX ADMIN — Medication 4: at 17:16

## 2023-03-13 RX ADMIN — INSULIN GLARGINE 15 UNIT(S): 100 INJECTION, SOLUTION SUBCUTANEOUS at 21:29

## 2023-03-13 RX ADMIN — Medication 2: at 11:40

## 2023-03-13 RX ADMIN — SIMETHICONE 80 MILLIGRAM(S): 80 TABLET, CHEWABLE ORAL at 06:19

## 2023-03-13 RX ADMIN — CEFEPIME 100 MILLIGRAM(S): 1 INJECTION, POWDER, FOR SOLUTION INTRAMUSCULAR; INTRAVENOUS at 17:16

## 2023-03-13 RX ADMIN — TAMSULOSIN HYDROCHLORIDE 0.4 MILLIGRAM(S): 0.4 CAPSULE ORAL at 21:27

## 2023-03-13 RX ADMIN — SIMETHICONE 80 MILLIGRAM(S): 80 TABLET, CHEWABLE ORAL at 00:09

## 2023-03-13 RX ADMIN — ATORVASTATIN CALCIUM 20 MILLIGRAM(S): 80 TABLET, FILM COATED ORAL at 21:27

## 2023-03-13 RX ADMIN — PANTOPRAZOLE SODIUM 40 MILLIGRAM(S): 20 TABLET, DELAYED RELEASE ORAL at 17:16

## 2023-03-13 RX ADMIN — Medication 400 UNIT(S): at 11:22

## 2023-03-13 RX ADMIN — MONTELUKAST 10 MILLIGRAM(S): 4 TABLET, CHEWABLE ORAL at 11:21

## 2023-03-13 RX ADMIN — Medication 1 TABLET(S): at 11:20

## 2023-03-13 RX ADMIN — PREGABALIN 1000 MICROGRAM(S): 225 CAPSULE ORAL at 11:21

## 2023-03-13 RX ADMIN — Medication 50 MILLIGRAM(S): at 17:17

## 2023-03-13 RX ADMIN — CEFEPIME 100 MILLIGRAM(S): 1 INJECTION, POWDER, FOR SOLUTION INTRAMUSCULAR; INTRAVENOUS at 06:19

## 2023-03-13 RX ADMIN — PANTOPRAZOLE SODIUM 40 MILLIGRAM(S): 20 TABLET, DELAYED RELEASE ORAL at 06:18

## 2023-03-13 RX ADMIN — Medication 4: at 00:10

## 2023-03-13 RX ADMIN — Medication 500 MILLIGRAM(S): at 11:20

## 2023-03-13 RX ADMIN — Medication 2: at 06:20

## 2023-03-13 RX ADMIN — Medication 250 MILLIGRAM(S): at 06:19

## 2023-03-13 RX ADMIN — SIMETHICONE 80 MILLIGRAM(S): 80 TABLET, CHEWABLE ORAL at 11:22

## 2023-03-13 RX ADMIN — Medication 2: at 21:27

## 2023-03-13 RX ADMIN — SIMETHICONE 80 MILLIGRAM(S): 80 TABLET, CHEWABLE ORAL at 17:17

## 2023-03-13 RX ADMIN — SIMETHICONE 80 MILLIGRAM(S): 80 TABLET, CHEWABLE ORAL at 23:02

## 2023-03-13 RX ADMIN — Medication 1 GRAM(S): at 23:01

## 2023-03-13 RX ADMIN — TIOTROPIUM BROMIDE 2 PUFF(S): 18 CAPSULE ORAL; RESPIRATORY (INHALATION) at 06:18

## 2023-03-13 NOTE — PROVIDER CONTACT NOTE (OTHER) - ACTION/TREATMENT ORDERED:
MD notified & made aware, awaiting orders
Md Calix notified and made aware. Do not administer lasix as ordered. Continue to monitor.
per md no new orders at this time.
NA Continue to monitor as per doctor
per md he will add orders no new orders for now.
MD Reyes notified and made aware. MD to make pt NPO ex meds, will order Q6 sliding scale for 0000, and OK to give ordered Lantus. Continue to monitor.
MD Reyes notified and made aware. No IVF to be ordered. Continue to monitor.
per md he will consult with GI. No new orders for now.
per md he will order stat gi pcr and stat hgb and hct. No new orders.

## 2023-03-13 NOTE — PROGRESS NOTE ADULT - ASSESSMENT
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     OM  COURTNEY  Asthma  COPD  HTN  CAD  CABG hx    home NIV device for COPD night time  poss IR procedure  NPO    NIV use night time and prn - 18/8 and 25 pct fio2  sleep hygiene reviewed  [facility]vs rx regimen  BP control  pt is on Nucala in the office - Monthly with Dr Oscar Marcelino - [facility]  on Symbicort - Spiriva - Singulair - copd - asthma -   NEBS PRN  monitor VS and Sat  keep sat > 88 pct  spoke with pt - wife  outpatient records reviewed  emp ABX in progress - ID follow up  skin - wound care

## 2023-03-13 NOTE — PROGRESS NOTE ADULT - ASSESSMENT
82 yr male with history of Hypertension, COPD home oxygen dependent, CAD CABG , chronic left femor osteomyelitis since traumatic left femoral fracture in 1966 presents with new yellowish  drainage from left thigh. Cardiology consulted for clearance for GI scopy.     Cardiac clearance HTN, CAD, CABG  - known CAD, CABG  - EKG showed SR @ 93.   - No evidence of any active ischemia  - Holding aspirin 2/2 GI bleed  - Continue statin and BB    - H/o atrial paroxysmal tachycardia.   - Holding Apixaban 2.5 mg po bid given concern for GIB  - Tele with SR 70-90s     - Previous TTE 12/21 showed overall normal systolic function with an estimated LVEF of 55-60%.   - Pt sees Dr Breaux for cardiology.   - Continue Lasix 20 mg PO daily  - No evidence of any meaningful volume overload     - BP stable and controlled    - GI consulted for BRBPR. S/p PRBCs   - Plan for GI scopy.   - Pt has no active ischemia, decompensated heart failure, unstable arrythmia, or severe stenotic valvular disease. Patient is optimized from cardiovascular standpoint to proceed with planned procedure with routine hemodynamic monitoring.     - ID following and is on antibiotics for chr osteomyelitis.   - SCDs for DVT prophylaxis   - Monitor and replete lytes, keep K>4, Mg>2.  - Will continue to follow.    Elizabeth Negron, MS FNP, [facility]  Nurse Practitioner- Cardiology   Spectra #[phone]

## 2023-03-13 NOTE — PROVIDER CONTACT NOTE (OTHER) - REASON
bloody bowels
hypotension
remote tele
large soft bloody stool.
pt. is a Accucheck ACHS but pt. has no bedtime sliding scale at this time
Called by Teletach Prema for rhythm change
Pt NPO at 0000, IVF?
Pt NPO at 0000, need q6 sliding scale, need NPO ex meds, can pt get ordered Lantus since he will be NPO
SBP = 101, Pt NPO, ordered for lasix w/ no parameters

## 2023-03-13 NOTE — PROGRESS NOTE ADULT - ASSESSMENT
82 yr male with history of CAD previous CABG with 3 bypass on plavix , Hypertension, COPD  home oxygen dependent, long standing chronic left femoral osteomyelitis following traumatic fracture of  femur in 1966 , present with new drainage from left thigh chronic osteomyelitis .     #GI bleed and ABLA - not controlled   BRBPR on 3/9 and 3/11  Holding Eliquis and ASA.  s/p total 4 units PRBC transfusion  Continue Protonix 40mg IV Q12h  EGD pending 3/13    #MERRILL  s/p 2L NS hydration and PRBC transfusions  Renal indices improved  daily BMP    #Chronic osteomyelitis  Had abscess and drainage in November 2022  Had been variously treated with antibiotics   ID consulted  - Continue Cefepime 2 g iv q 12h  Left femur MRI: Chronic osteomyelitis with deformity of the mid to distal femur with increase in the osseous marrow edema and loss of normal T1 fatty marrow adjacent to the defect concerning for acute on chronic osteomyelitis.   Increase in size in the soft tissue abscess centered deep to the vastus intermedius measuring up to 22cm CC.  Wound care.   IR guided drain placement for abscess after GIB improves    #HTN (hypertension).   Metoprolol 50 mg po bid with holding parameters    # DM (diabetes mellitus), type 2.   Insulin sliding scale , on home Jardiance   Lantus 15 units SQ QHS and moderate lispro sliding scale, monitor FS.    #CAD (coronary artery disease).   hold ASA  due to GIB   Atorvastatin 20 mg po daily.    #COPD, moderate.   Inhaled broncho dilators , on home incruse and breo-formulary interchange   Singulair 10 mg po hs  not on home Prednisone, will d/c  on BIPAP at night   Xray: < from: Xray Chest 1 View- PORTABLE-Urgent (Xray Chest 1 View- PORTABLE-Urgent .) (03.07.23 @ 09:59) >  IMPRESSION: COPD left base scarring again noted.  pulmonary f/u    #History of atrial paroxysmal tachycardia.   Hold Apixaban 2.5 mg po bid given concern for GIB.  Continue metoprolol     #BPH with obstruction/lower urinary tract symptoms.   Tamsulosin 0.4 mg po daily.    VTE ppx:  SCD

## 2023-03-13 NOTE — CHART NOTE - NSCHARTNOTEFT_GEN_A_CORE
s/p  upper gastrointestinal endoscopy with biopsy  gastritis  gastric ulcer  hiatal hernia    plan  check cbc  ppi once a day  carafate 1 gr po qd  f/u bx    Advanced care planning was discussed with patient and family.  Advanced care planning forms were reviewed and discussed.  Risks, benefits and alternatives of gastroenterologic procedures were discussed in detail and all questions were answered.    30 minutes spent.

## 2023-03-13 NOTE — PROGRESS NOTE ADULT - SUBJECTIVE AND OBJECTIVE BOX
[facility]  INFECTIOUS DISEASES   [address] [place] [address]  Tel: [phone]     Fax: [phone]  ========================================================  MD Noman Perry Kaushal, MD Cho, Michelle, MD Sunjit, Jaspal, MD  ========================================================    [id number]  [facility]     Follow up: left thigh abscess and OM    No fever, some discharge from left thigh.  Had blood in stool and drop in H/H so had transfusion, going for EGD today.     PAST MEDICAL & SURGICAL HISTORY:  Diabetes Mellitus, Type II  CAD (Coronary Artery Disease)  s/p 3v CABG ; stents placed in [place] in   Dyslipidemia  Osteomyelitis  COPD (chronic obstructive pulmonary disease)  on 2L at home and BiPAP at night; intubated   Hypertension  PVD (peripheral vascular disease)  History of PAT (paroxysmal atrial tachycardia)  Asthma with COPD  BPH (benign prostatic hyperplasia)  Acute osteomyelitis  CABG (Coronary Artery Bypass Graft)    Compound fracture  left leg  S/P primary angioplasty with coronary stent  H/O drainage of abscess  Left femur 2021    Social Hx: No current smoking, ETOH or drugs     FAMILY HISTORY:  Family history of diabetes mellitus (Sibling)    Family hx of lung cancer  brother,  age 82, used to smoke with pt    Allergies  No Known Allergies    Intolerances  shellfish (Nausea)    Antibiotics:  Ciprofloxacin      REVIEW OF SYSTEMS:  CONSTITUTIONAL:  No Fever or chills  HEENT:  No diplopia or blurred vision.  No sore throat or runny nose.  CARDIOVASCULAR:  No chest pain or SOB.  RESPIRATORY:  No cough, shortness of breath, PND or orthopnea.  GASTROINTESTINAL:  No nausea, vomiting or diarrhea.  GENITOURINARY:  No dysuria, frequency or urgency. No Blood in urine  MUSCULOSKELETAL:  left thigh pain and drainage   SKIN:  No change in skin, hair or nails.  NEUROLOGIC:  No paresthesias or weakness.  PSYCHIATRIC:  No disorder of thought or mood.  ENDOCRINE:  No heat or cold intolerance, polyuria or polydipsia.  HEMATOLOGICAL:  No easy bruising or bleeding.     Physical Exam:  Vital Signs Last 24 Hrs  T(C): 37 (13 Mar 2023 12:38), Max: 37 (13 Mar 2023 12:38)  T(F): 98.6 (13 Mar 2023 12:38), Max: 98.6 (13 Mar 2023 12:38)  HR: 72 (13 Mar 2023 12:38) (72 - 94)  BP: 103/47 (13 Mar 2023 12:38) (98/60 - 103/47)  BP(mean): --  RR: 16 (13 Mar 2023 12:38) (16 - 18)  SpO2: 97% (13 Mar 2023 12:38) (93% - 100%)  Parameters below as of 13 Mar 2023 12:38  Patient On (Oxygen Delivery Method): nasal cannula  O2 Flow (L/min): 2  GEN: NAD  HEENT: normocephalic and atraumatic. EOMI. PERRL.    NECK: Supple.  No lymphadenopathy   LUNGS: poor air movement   HEART: Regular rate and rhythm   ABDOMEN: Soft, nontender, and nondistended.  Positive bowel sounds.    : No CVA tenderness  EXTREMITIES: left thigh with scar in lateral side with fluctuation and small opening with yellow discharge   NEUROLOGIC: grossly intact.  PSYCHIATRIC: Appropriate affect .  SKIN: No rash     Labs:                        7.7    7.99  )-----------( 228      ( 13 Mar 2023 07:30 )             24.7         140  |  109<H>  |  22  ----------------------------<  153<H>  3.8   |  27  |  1.00    Ca    9.0      13 Mar 2023 07:30  Mg     1.8         Culture - Abscess with Gram Stain (collected 03-10-23 @ 11:50)  Source: .Abscess left leg    Culture - Abscess with Gram Stain (collected 23 @ 13:50)  Source: .Abscess left thigh  Final Report (23 @ 14:49):    Moderate Coag Negative Staphylococcus "Susceptibilities not performed"    Multiple Morphological Strains    Culture - Urine (collected 23 @ 20:15)  Source: Clean Catch Clean Catch (Midstream)  Final Report (23 @ 23:02):    <10,000 CFU/mL Normal Urogenital Shayy    Culture - Blood (collected 23 @ 15:53)  Source: .Blood Blood-Peripheral  Final Report (23 @ 23:01):    No Growth Final    Culture - Blood (collected 23 @ 15:43)  Source: .Blood Blood-Peripheral  Final Report (23 @ 23:01):    No Growth Final    WBC Count: 7.99 K/uL (23 @ 07:30)  WBC Count: 8.49 K/uL (23 @ 07:43)  WBC Count: 10.08 K/uL (23 @ 07:12)  WBC Count: 10.63 K/uL (03-10-23 @ 05:00)  WBC Count: 9.56 K/uL (23 @ 06:55)    Creatinine, Serum: 1.00 mg/dL (23 @ 07:30)  Creatinine, Serum: 1.10 mg/dL (23 @ 07:43)  Creatinine, Serum: 1.10 mg/dL (23 @ 07:12)  Creatinine, Serum: 1.30 mg/dL (03-10-23 @ 05:00)  Creatinine, Serum: 1.40 mg/dL (23 @ 06:55)    C-Reactive Protein, Serum: 45 mg/L (23 @ 15:53)    Sedimentation Rate, Erythrocyte: 39 mm/hr (23 @ 15:53)    COVID-19 PCR: NotDetec (23 @ 18:44)  SARS-CoV-2 Result: NotDetec (23 @ 15:53)    All imaging and other data have been reviewed.  < from: MR Femur No Cont, Left (22 @ 13:40) >  IMPRESSION:  Chronic osteomyelitis with deformity of bone and with scattered areas of   T2 hyperintensity, less prominent than on the previous MRI of 2021,   however cannot exclude superimposed acute osteomyelitis/intraosseous   abscess.  Fluid tract extending from the chronic bony defect is contiguous with a   soft tissue abscess centered deep to the vastus intermedius measuring  12.9 cm craniocaudally, with surrounding surrounding muscle and soft   tissue edema.    Assessment and Plan:   81yo man with PMH of HTN, COPD on home O2, CAD s/p CABG, PVD s/p stents in b/l legs and left femoral fracture more than 50 years ago, was admitted at [facility] in 2021 with left  leg pain on the site of old fracture after CT showed possible intramedullary abscess. He had pain and infection in the old fracture area at least 3-4 times in the past, had multiple surgeries   and drainage of area with a long course of antibiotic treatment.  MRI showed collection, intraosseous abscess  S/P IR drain placement on 21  IR culture NGTD but had another culture with enterobacter.   Completed 6 weeks of ceftriaxone 2gm with PICC line in 2022  He was seen in the clinic and was started on suppressive ciprofloxacin for good oral bioavailability and also based on the culture.  MRI 2022 done showed 12.9cm collection with OM.   He stopped cipro sometimes last year and now feels more pain and swelling in area and started draining again.   Now MRI with 22cm collection.   Drop is H/H continues, s/p transfusion again, going for EGD today.      Recommendations:  - Continue on cefepime 2gm q12  - MRI result noted, IR has been consulted for possible drain placement   - Wound discharge sent for culture twice both times with skin shayy, CoNS and corynebacterium   - GI work up for possible peptic ulcer     Will follow.  Discussed with his wife at the bedside.     Brandi العلي MD  Division of Infectious Diseases   Please call ID service at [phone] with any question.      35 minutes spent on total encounter assessing patient, examination, chart review, counseling and coordinating care by the attending physician/nurse/care manager.

## 2023-03-13 NOTE — DIETITIAN INITIAL EVALUATION ADULT - OTHER INFO
83 y/o male adm with cellulitis of left lower ext. PMH acute osteomyelitis, BPH, asthma with COPD, PVD, HTN, COPD, CAD, dyslipidemia, DM type 2. Pt sleeps with BIPAP. Pt visited at bedside this am. Pt sleeping soundly in bed with towel over top of his head. Pt's wife at bedside. Pt currently NPO for procedure. Prior to NPO status, pt has been eating well, tolerating meals. Pt's wife states that they have been educated many times over the years on a consistent carb diet and declined any additional information at this time.

## 2023-03-13 NOTE — PROGRESS NOTE ADULT - SUBJECTIVE AND OBJECTIVE BOX
Date/Time Patient Seen:  		  Referring MD:   Data Reviewed	       Patient is a 82y old  Male who presents with a chief complaint of Left  thigh draining sinus (12 Mar 2023 09:23)      Subjective/HPI     PAST MEDICAL & SURGICAL HISTORY:  Diabetes Mellitus, Type II    CAD (Coronary Artery Disease)  s/p 3v CABG 2004; stents placed in [place] in 2019    Dyslipidemia    Asymptomatic Peripheral Vascular Disease    Osteomyelitis    COPD (chronic obstructive pulmonary disease)  on 2L at home and BiPAP at night; intubated 6/18    Diabetes mellitus    Hypertension    PVD (peripheral vascular disease)    History of PAT (paroxysmal atrial tachycardia)    Asthma with COPD    BPH (benign prostatic hyperplasia)    Acute osteomyelitis    CABG (Coronary Artery Bypass Graft)  2004    Compound fracture  left leg    S/P primary angioplasty with coronary stent    H/O drainage of abscess  Left femur 12/2021          Medication list         MEDICATIONS  (STANDING):  ascorbic acid 500 milliGRAM(s) Oral daily  atorvastatin 20 milliGRAM(s) Oral at bedtime  budesonide 160 MICROgram(s)/formoterol 4.5 MICROgram(s) Inhaler 2 Puff(s) Inhalation two times a day  cefepime   IVPB      cefepime   IVPB 2000 milliGRAM(s) IV Intermittent every 12 hours  cholecalciferol 400 Unit(s) Oral daily  cyanocobalamin 1000 MICROGram(s) Oral daily  dextrose 5%. 1000 milliLiter(s) (100 mL/Hr) IV Continuous <Continuous>  dextrose 5%. 1000 milliLiter(s) (50 mL/Hr) IV Continuous <Continuous>  dextrose 50% Injectable 25 Gram(s) IV Push once  dextrose 50% Injectable 12.5 Gram(s) IV Push once  dextrose 50% Injectable 25 Gram(s) IV Push once  furosemide    Tablet 20 milliGRAM(s) Oral daily  glucagon  Injectable 1 milliGRAM(s) IntraMuscular once  insulin glargine Injectable (LANTUS) 15 Unit(s) SubCutaneous at bedtime  insulin lispro (ADMELOG) corrective regimen sliding scale   SubCutaneous at bedtime  insulin lispro (ADMELOG) corrective regimen sliding scale   SubCutaneous every 6 hours  mepolizumab Subcutaneous Injectable 100 milliGRAM(s) SubCutaneous every 4 weeks  metoprolol tartrate 50 milliGRAM(s) Oral two times a day  montelukast 10 milliGRAM(s) Oral daily  multivitamin 1 Tablet(s) Oral daily  Nephro-carlos 1 Tablet(s) Oral daily  pantoprazole  Injectable 40 milliGRAM(s) IV Push every 12 hours  saccharomyces boulardii 250 milliGRAM(s) Oral two times a day  simethicone 80 milliGRAM(s) Chew every 6 hours  tamsulosin 0.4 milliGRAM(s) Oral at bedtime  tiotropium 2.5 MICROgram(s) Inhaler 2 Puff(s) Inhalation daily    MEDICATIONS  (PRN):  albuterol/ipratropium for Nebulization 3 milliLiter(s) Nebulizer every 6 hours PRN Shortness of Breath and/or Wheezing  dextrose Oral Gel 15 Gram(s) Oral once PRN Blood Glucose LESS THAN 70 milliGRAM(s)/deciliter         Vitals log        ICU Vital Signs Last 24 Hrs  T(C): 36.7 (13 Mar 2023 05:14), Max: 36.8 (12 Mar 2023 13:04)  T(F): 98 (13 Mar 2023 05:14), Max: 98.2 (12 Mar 2023 13:04)  HR: 90 (13 Mar 2023 05:14) (80 - 94)  BP: 101/53 (13 Mar 2023 05:14) (101/53 - 111/66)  BP(mean): --  ABP: --  ABP(mean): --  RR: 18 (13 Mar 2023 05:14) (17 - 18)  SpO2: 96% (13 Mar 2023 05:14) (93% - 99%)    O2 Parameters below as of 13 Mar 2023 05:14  Patient On (Oxygen Delivery Method): BiPAP/CPAP                 Input and Output:  I&O's Detail    11 Mar 2023 06:01  -  12 Mar 2023 07:00  --------------------------------------------------------  IN:    IV PiggyBack: 50 mL    Oral Fluid: 240 mL    PRBCs (Packed Red Blood Cells): 548 mL  Total IN: 838 mL    OUT:    Voided (mL): 700 mL  Total OUT: 700 mL    Total NET: 138 mL      12 Mar 2023 07:01  -  13 Mar 2023 05:39  --------------------------------------------------------  IN:    Oral Fluid: 240 mL  Total IN: 240 mL    OUT:    Voided (mL): 650 mL  Total OUT: 650 mL    Total NET: -410 mL          Lab Data                        8.2    x     )-----------( x        ( 12 Mar 2023 16:14 )             26.8     03-12    140  |  110<H>  |  23  ----------------------------<  152<H>  4.1   |  27  |  1.10    Ca    8.6      12 Mar 2023 07:43  Mg     1.8     03-11              Review of Systems	      Objective     Physical Examination    heart s1s2  lung dec BS  head nc      Pertinent Lab findings & Imaging      Eliecer:  NO   Adequate UO     I&O's Detail    11 Mar 2023 06:01  -  12 Mar 2023 07:00  --------------------------------------------------------  IN:    IV PiggyBack: 50 mL    Oral Fluid: 240 mL    PRBCs (Packed Red Blood Cells): 548 mL  Total IN: 838 mL    OUT:    Voided (mL): 700 mL  Total OUT: 700 mL    Total NET: 138 mL      12 Mar 2023 07:01  -  13 Mar 2023 05:39  --------------------------------------------------------  IN:    Oral Fluid: 240 mL  Total IN: 240 mL    OUT:    Voided (mL): 650 mL  Total OUT: 650 mL    Total NET: -410 mL               Discussed with:     Cultures:	        Radiology

## 2023-03-13 NOTE — DIETITIAN INITIAL EVALUATION ADULT - ORAL INTAKE PTA/DIET HISTORY
pt eats well at home, as per pt's wife  blood sugars have been higher recently  wife tests pt's blood sugars at home

## 2023-03-13 NOTE — PROVIDER CONTACT NOTE (OTHER) - SITUATION
SBP = 101, Pt NPO, ordered for lasix w/ no parameters
change of shift pt noted to be in bathroom
pt. is a Accucheck ACHS but pt. has no bedtime sliding scale at this time
Pt on remote cardiac monitor sensing 9 beats of Vtach
Pt NPO at 0000, IVF?
Pt NPO at 0000, need q6 sliding scale, need NPO ex meds, can pt get ordered Lantus since he will be NPO

## 2023-03-13 NOTE — PROVIDER CONTACT NOTE (OTHER) - DATE AND TIME:
13-Mar-2023 00:05
10-Mar-2023 11:40
09-Mar-2023 09:15
10-Mar-2023 18:04
12-Mar-2023 21:05
13-Mar-2023 05:57
07-Mar-2023 21:55
08-Mar-2023 17:45
09-Mar-2023 07:18

## 2023-03-13 NOTE — DIETITIAN INITIAL EVALUATION ADULT - ENTER FROM (G/KG)
1.1
Ears: no ear pain and no hearing problems. Nose: no nasal congestion and no nasal drainage. Mouth/Throat: no dysphagia, no hoarseness and no throat pain. Neck: no lumps, no pain, no stiffness and no swollen glands

## 2023-03-13 NOTE — PROVIDER CONTACT NOTE (OTHER) - BACKGROUND
hypotension  bolus given   IVf in progress
Admit w/ left leg wound, having bloody BMs, GI bleed
Admit w/ left thigh wound, Had bloody BMs, DX w/ GI bleed
Pt has hx of COPD. Pt is home O2 dependent
pt. admitted for LLE cellulitis
last hgb 12.5
metoprolol given this shift
Admit w/ left leg wound, now w/ GI bleed

## 2023-03-13 NOTE — PROGRESS NOTE ADULT - SUBJECTIVE AND OBJECTIVE BOX
[facility] -- Génesis Villavicencio Pannella, Patel, Carolina Heath: Office # [phone]    Follow Up:  Cardiac clearance, GI bleed    Subjective/Observations: Patient seen and examined. Patient awake, alert, resting in bed. No complaints of chest pain, dyspnea, palpitations or dizziness. No signs of orthopnea or PND. Tolerating room air at present, but uses O2 as needed.     REVIEW OF SYSTEMS: All other review of systems are negative unless indicated above    PAST MEDICAL & SURGICAL HISTORY:  Diabetes Mellitus, Type II    CAD (Coronary Artery Disease)  s/p 3v CABG 2004; stents placed in [place] in 2019    Dyslipidemia      Osteomyelitis      COPD (chronic obstructive pulmonary disease)  on 2L at home and BiPAP at night; intubated 6/18      Hypertension      Hypertension      PVD (peripheral vascular disease)      History of PAT (paroxysmal atrial tachycardia)      Asthma with COPD      BPH (benign prostatic hyperplasia)      Acute osteomyelitis      CABG (Coronary Artery Bypass Graft)  2004      Compound fracture  left leg      S/P primary angioplasty with coronary stent      H/O drainage of abscess  Left femur 12/2021    MEDICATIONS  (STANDING):  ascorbic acid 500 milliGRAM(s) Oral daily  atorvastatin 20 milliGRAM(s) Oral at bedtime  budesonide 160 MICROgram(s)/formoterol 4.5 MICROgram(s) Inhaler 2 Puff(s) Inhalation two times a day  cefepime   IVPB      cefepime   IVPB 2000 milliGRAM(s) IV Intermittent every 12 hours  cholecalciferol 400 Unit(s) Oral daily  cyanocobalamin 1000 MICROGram(s) Oral daily  dextrose 5%. 1000 milliLiter(s) (100 mL/Hr) IV Continuous <Continuous>  dextrose 5%. 1000 milliLiter(s) (50 mL/Hr) IV Continuous <Continuous>  dextrose 50% Injectable 25 Gram(s) IV Push once  dextrose 50% Injectable 12.5 Gram(s) IV Push once  dextrose 50% Injectable 25 Gram(s) IV Push once  furosemide    Tablet 20 milliGRAM(s) Oral daily  glucagon  Injectable 1 milliGRAM(s) IntraMuscular once  insulin glargine Injectable (LANTUS) 15 Unit(s) SubCutaneous at bedtime  insulin lispro (ADMELOG) corrective regimen sliding scale   SubCutaneous at bedtime  insulin lispro (ADMELOG) corrective regimen sliding scale   SubCutaneous every 6 hours  mepolizumab Subcutaneous Injectable 100 milliGRAM(s) SubCutaneous every 4 weeks  metoprolol tartrate 50 milliGRAM(s) Oral two times a day  montelukast 10 milliGRAM(s) Oral daily  multivitamin 1 Tablet(s) Oral daily  Nephro-carlos 1 Tablet(s) Oral daily  pantoprazole  Injectable 40 milliGRAM(s) IV Push every 12 hours  saccharomyces boulardii 250 milliGRAM(s) Oral two times a day  simethicone 80 milliGRAM(s) Chew every 6 hours  tamsulosin 0.4 milliGRAM(s) Oral at bedtime  tiotropium 2.5 MICROgram(s) Inhaler 2 Puff(s) Inhalation daily    MEDICATIONS  (PRN):  albuterol/ipratropium for Nebulization 3 milliLiter(s) Nebulizer every 6 hours PRN Shortness of Breath and/or Wheezing  dextrose Oral Gel 15 Gram(s) Oral once PRN Blood Glucose LESS THAN 70 milliGRAM(s)/deciliter    Allergies    [This allergen will not trigger allergy alert] IV DYE, IODINE CONTRAST (Unknown)  [This allergen will not trigger allergy alert] NKDA (Unknown)  latex (Unknown)    Intolerances    shellfish (Nausea)    Vital Signs Last 24 Hrs  T(C): 36.7 (13 Mar 2023 05:14), Max: 36.8 (12 Mar 2023 13:04)  T(F): 98 (13 Mar 2023 05:14), Max: 98.2 (12 Mar 2023 13:04)  HR: 87 (13 Mar 2023 06:04) (80 - 94)  BP: 101/53 (13 Mar 2023 05:14) (101/53 - 111/66)  BP(mean): --  RR: 18 (13 Mar 2023 05:14) (17 - 18)  SpO2: 100% (13 Mar 2023 06:04) (93% - 100%)    Parameters below as of 13 Mar 2023 06:04  Patient On (Oxygen Delivery Method): room air      I&O's Summary    12 Mar 2023 07:01  -  13 Mar 2023 07:00  --------------------------------------------------------  IN: 290 mL / OUT: 1000 mL / NET: -710 mL      TELE: SR 70-90s, 120s x 1   PHYSICAL EXAM:  Constitutional: NAD, awake and alert, Obese  HEENT: Moist Mucous Membranes, Anicteric  Pulmonary: Non-labored, breath sounds are clear bilaterally, Diminished at bases No wheezing, fine  rales at base  Cardiovascular: Regular, S1 and S2, No murmurs, No rubs, gallops or clicks  Gastrointestinal: Bowel Sounds present, soft, nontender.   Lymph: No peripheral edema. No lymphadenopathy.  Skin: No visible rashes or ulcers.  Psych:  Mood & affect appropriate      LABS: All Labs Reviewed:                        7.7    7.99  )-----------( 228      ( 13 Mar 2023 07:30 )             24.7                         8.2    x     )-----------( x        ( 12 Mar 2023 16:14 )             26.8                         8.9    8.49  )-----------( 200      ( 12 Mar 2023 07:43 )             28.8     13 Mar 2023 07:30    140    |  109    |  22     ----------------------------<  153    3.8     |  27     |  1.00   12 Mar 2023 07:43    140    |  110    |  23     ----------------------------<  152    4.1     |  27     |  1.10   11 Mar 2023 07:12    139    |  109    |  22     ----------------------------<  189    4.1     |  25     |  1.10     Ca    9.0        13 Mar 2023 07:30  Ca    8.6        12 Mar 2023 07:43  Ca    8.7        11 Mar 2023 07:12  Mg     1.8       13 Mar 2023 07:30  Mg     1.8       11 Mar 2023 07:12       PT/INR - ( 13 Mar 2023 07:30 )   PT: 12.0 sec;   INR: 1.03 ratio         PTT - ( 13 Mar 2023 07:30 )  PTT:29.0 sec  12 Lead ECG:   Ventricular Rate 93 BPM    Atrial Rate 93 BPM    P-R Interval 162 ms    QRS Duration 86 ms    Q-T Interval 354 ms    QTC Calculation(Bazett) 440 ms    P Axis 82 degrees    R Axis 83 degrees    T Axis 68 degrees    Diagnosis Line Normal sinus rhythm  Normal ECG  When compared with ECG of 27-NOV-2022 08:19,  No significant change was found  Confirmed by deep Heath ([id number]) on 3/7/2023 2:22:00 PM (03-06-23 @ 19:32)       EXAM:  ECHO TTE WO CON COMP W DOPP         PROCEDURE DATE:  12/20/2021        INTERPRETATION:  INDICATION: Ischemic heart disease  sonographer KL    Blood Pressure 123/70    Height 180.3 cm     Weight 97.5 kg       BSA 2.2   sq m    Dimensions:  LA 3.0       Normal Values: 2.0 - 4.0 cm  Ao 3.5        Normal Values: 2.0 - 3.8 cm  SEPTUM 1.3       Normal Values: 0.6 - 1.2 cm  PWT 1.0       Normal Values: 0.6 - 1.1 cm  LVIDd 4.3         Normal Values: 3.0 - 5.6 cm  LVIDs 1.8         Normal Values: 1.8 - 4.0 cm      OBSERVATIONS:  Technically difficult and limited study  Mitral Valve: normal, trace physiologic MR.  Aortic Valve/Aorta: Sclerotic trileaflet aortic valve with normal opening.  Tricuspid Valve: Not well-visualized  Pulmonic Valve: Not well-visualized  Left Atrium: normal  Right Atrium: Not well-visualized  Left Ventricle: The left ventricular endocardium is not well-visualized.   Overall normal systolic function with an estimated LVEF of 55%. Cannot   evaluate for segmental abnormalities  Right Ventricle: Not well-visualized  Pericardium: no significant pericardial effusion.  Pulmonary/RV Pressure: estimated PA systolic pressure of 28 mmHg  LV diastolic dysfunction is present    IMPRESSION:  Technically difficult and limited study  The left ventricular endocardium is not well-visualized. Overall normal   systolic function with an estimated LVEF of 55-60%. Cannot evaluate for   segmental abnormalities  The right ventricle is not well-visualized  Sclerotic trileaflet aortic valve, without AI.  Trace physiologic MR  No significant pericardial effusion.  --- End of Report ---   ARISTIDES LEE MD; Attending Cardiologist  This document has been electronically signed. Dec 21 2021  1:38PM

## 2023-03-13 NOTE — DIETITIAN INITIAL EVALUATION ADULT - PERTINENT MEDS FT
MEDICATIONS  (STANDING):  ascorbic acid 500 milliGRAM(s) Oral daily  atorvastatin 20 milliGRAM(s) Oral at bedtime  budesonide 160 MICROgram(s)/formoterol 4.5 MICROgram(s) Inhaler 2 Puff(s) Inhalation two times a day  cefepime   IVPB      cefepime   IVPB 2000 milliGRAM(s) IV Intermittent every 12 hours  cholecalciferol 400 Unit(s) Oral daily  cyanocobalamin 1000 MICROGram(s) Oral daily  dextrose 5%. 1000 milliLiter(s) (100 mL/Hr) IV Continuous <Continuous>  dextrose 5%. 1000 milliLiter(s) (50 mL/Hr) IV Continuous <Continuous>  dextrose 50% Injectable 25 Gram(s) IV Push once  dextrose 50% Injectable 12.5 Gram(s) IV Push once  dextrose 50% Injectable 25 Gram(s) IV Push once  furosemide    Tablet 20 milliGRAM(s) Oral daily  glucagon  Injectable 1 milliGRAM(s) IntraMuscular once  insulin glargine Injectable (LANTUS) 15 Unit(s) SubCutaneous at bedtime  insulin lispro (ADMELOG) corrective regimen sliding scale   SubCutaneous at bedtime  insulin lispro (ADMELOG) corrective regimen sliding scale   SubCutaneous every 6 hours  mepolizumab Subcutaneous Injectable 100 milliGRAM(s) SubCutaneous every 4 weeks  metoprolol tartrate 50 milliGRAM(s) Oral two times a day  montelukast 10 milliGRAM(s) Oral daily  multivitamin 1 Tablet(s) Oral daily  Nephro-carlos 1 Tablet(s) Oral daily  pantoprazole  Injectable 40 milliGRAM(s) IV Push every 12 hours  saccharomyces boulardii 250 milliGRAM(s) Oral two times a day  simethicone 80 milliGRAM(s) Chew every 6 hours  tamsulosin 0.4 milliGRAM(s) Oral at bedtime  tiotropium 2.5 MICROgram(s) Inhaler 2 Puff(s) Inhalation daily    MEDICATIONS  (PRN):  albuterol/ipratropium for Nebulization 3 milliLiter(s) Nebulizer every 6 hours PRN Shortness of Breath and/or Wheezing  dextrose Oral Gel 15 Gram(s) Oral once PRN Blood Glucose LESS THAN 70 milliGRAM(s)/deciliter

## 2023-03-13 NOTE — DIETITIAN INITIAL EVALUATION ADULT - PHYSCIAL ASSESSMENT
unable to access muscle/fat stores 2/2 pt sleeping and completely covered under sheets/towels  no wt loss noted  eats well

## 2023-03-13 NOTE — PROGRESS NOTE ADULT - SUBJECTIVE AND OBJECTIVE BOX
Coni Cunha M.D.    Patient is a 82y old  Male who presents with a chief complaint of Left  thigh draining sinus (13 Mar 2023 10:23)      SUBJECTIVE / OVERNIGHT EVENTS: no event overnight. Wife at bedside, reported some BRBPR yesterday.     Patient denies chest pain, SOB, abd pain, N/V, fever, chills, dysuria or any other complaints. All remainder ROS negative.     MEDICATIONS  (STANDING):  ascorbic acid 500 milliGRAM(s) Oral daily  atorvastatin 20 milliGRAM(s) Oral at bedtime  budesonide 160 MICROgram(s)/formoterol 4.5 MICROgram(s) Inhaler 2 Puff(s) Inhalation two times a day  cefepime   IVPB      cefepime   IVPB 2000 milliGRAM(s) IV Intermittent every 12 hours  cholecalciferol 400 Unit(s) Oral daily  cyanocobalamin 1000 MICROGram(s) Oral daily  dextrose 5%. 1000 milliLiter(s) (100 mL/Hr) IV Continuous <Continuous>  dextrose 5%. 1000 milliLiter(s) (50 mL/Hr) IV Continuous <Continuous>  dextrose 50% Injectable 25 Gram(s) IV Push once  dextrose 50% Injectable 12.5 Gram(s) IV Push once  dextrose 50% Injectable 25 Gram(s) IV Push once  furosemide    Tablet 20 milliGRAM(s) Oral daily  glucagon  Injectable 1 milliGRAM(s) IntraMuscular once  insulin glargine Injectable (LANTUS) 15 Unit(s) SubCutaneous at bedtime  insulin lispro (ADMELOG) corrective regimen sliding scale   SubCutaneous at bedtime  insulin lispro (ADMELOG) corrective regimen sliding scale   SubCutaneous every 6 hours  mepolizumab Subcutaneous Injectable 100 milliGRAM(s) SubCutaneous every 4 weeks  metoprolol tartrate 50 milliGRAM(s) Oral two times a day  montelukast 10 milliGRAM(s) Oral daily  multivitamin 1 Tablet(s) Oral daily  Nephro-carlos 1 Tablet(s) Oral daily  pantoprazole  Injectable 40 milliGRAM(s) IV Push every 12 hours  saccharomyces boulardii 250 milliGRAM(s) Oral two times a day  simethicone 80 milliGRAM(s) Chew every 6 hours  tamsulosin 0.4 milliGRAM(s) Oral at bedtime  tiotropium 2.5 MICROgram(s) Inhaler 2 Puff(s) Inhalation daily    MEDICATIONS  (PRN):  albuterol/ipratropium for Nebulization 3 milliLiter(s) Nebulizer every 6 hours PRN Shortness of Breath and/or Wheezing  dextrose Oral Gel 15 Gram(s) Oral once PRN Blood Glucose LESS THAN 70 milliGRAM(s)/deciliter      I&O's Summary    12 Mar 2023 07:01  -  13 Mar 2023 07:00  --------------------------------------------------------  IN: 290 mL / OUT: 1000 mL / NET: -710 mL        PHYSICAL EXAM:  Vital Signs Last 24 Hrs  T(C): 36.7 (13 Mar 2023 05:14), Max: 36.8 (12 Mar 2023 13:04)  T(F): 98 (13 Mar 2023 05:14), Max: 98.2 (12 Mar 2023 13:04)  HR: 87 (13 Mar 2023 06:04) (80 - 94)  BP: 101/53 (13 Mar 2023 05:14) (101/53 - 111/66)  BP(mean): --  RR: 18 (13 Mar 2023 05:14) (17 - 18)  SpO2: 100% (13 Mar 2023 06:04) (93% - 100%)    Parameters below as of 13 Mar 2023 06:04  Patient On (Oxygen Delivery Method): room air    CONSTITUTIONAL: NAD, well-groomed  HEENT: EOMI, hearing normal, conjunctiva and sclera clear  Chest: CTA bilaterally, no wheezing  CV: S1S2, RRR,   GI: soft, +BS, NT/ND  Musculoskeletal: no LE edema, dressing over left lateral thigh, dry  Psychiatric: affect nL, mood nL  Skin: warm and dry    LABS:                        7.7    7.99  )-----------( 228      ( 13 Mar 2023 07:30 )             24.7     03-13    140  |  109<H>  |  22  ----------------------------<  153<H>  3.8   |  27  |  1.00    Ca    9.0      13 Mar 2023 07:30  Mg     1.8     03-13      PT/INR - ( 13 Mar 2023 07:30 )   PT: 12.0 sec;   INR: 1.03 ratio         PTT - ( 13 Mar 2023 07:30 )  PTT:29.0 sec          Culture - Abscess with Gram Stain (collected 10 Mar 2023 11:50)  Source: .Abscess left leg  Preliminary Report (12 Mar 2023 22:53):    Few Corynebacterium jeikeium    "Susceptibilities not performed"      CAPILLARY BLOOD GLUCOSE      POCT Blood Glucose.: 164 mg/dL (13 Mar 2023 06:15)  POCT Blood Glucose.: 217 mg/dL (13 Mar 2023 00:05)  POCT Blood Glucose.: 223 mg/dL (12 Mar 2023 21:02)  POCT Blood Glucose.: 211 mg/dL (12 Mar 2023 16:24)  POCT Blood Glucose.: 304 mg/dL (12 Mar 2023 12:42)      RADIOLOGY & ADDITIONAL TESTS:  Results Reviewed:   Imaging Personally Reviewed:  Electrocardiogram Personally Reviewed:

## 2023-03-13 NOTE — DIETITIAN INITIAL EVALUATION ADULT - PERTINENT LABORATORY DATA
03-13    140  |  109<H>  |  22  ----------------------------<  153<H>  3.8   |  27  |  1.00    Ca    9.0      13 Mar 2023 07:30  Mg     1.8     03-13    POCT Blood Glucose.: 164 mg/dL (03-13-23 @ 06:15)  A1C with Estimated Average Glucose Result: 8.0 % (03-07-23 @ 06:55)  A1C with Estimated Average Glucose Result: 7.8 % (08-05-22 @ 06:15)  A1C with Estimated Average Glucose Result: 6.9 % (05-26-22 @ 12:18)

## 2023-03-13 NOTE — DIETITIAN INITIAL EVALUATION ADULT - NS FNS DIET ORDER
Diet, NPO after Midnight:      NPO Start Date: 12-Mar-2023,   NPO Start Time: 23:59  Except Medications (03-12-23 @ 21:29)

## 2023-03-14 LAB
ANION GAP SERPL CALC-SCNC: 4 MMOL/L — LOW (ref 5–17)
BUN SERPL-MCNC: 21 MG/DL — SIGNIFICANT CHANGE UP (ref 7–23)
CALCIUM SERPL-MCNC: 8.9 MG/DL — SIGNIFICANT CHANGE UP (ref 8.5–10.1)
CHLORIDE SERPL-SCNC: 109 MMOL/L — HIGH (ref 96–108)
CO2 SERPL-SCNC: 29 MMOL/L — SIGNIFICANT CHANGE UP (ref 22–31)
CREAT SERPL-MCNC: 1.1 MG/DL — SIGNIFICANT CHANGE UP (ref 0.5–1.3)
EGFR: 67 ML/MIN/1.73M2 — SIGNIFICANT CHANGE UP
GLUCOSE SERPL-MCNC: 174 MG/DL — HIGH (ref 70–99)
HCT VFR BLD CALC: 26 % — LOW (ref 39–50)
HGB BLD-MCNC: 7.8 G/DL — LOW (ref 13–17)
MAGNESIUM SERPL-MCNC: 1.6 MG/DL — SIGNIFICANT CHANGE UP (ref 1.6–2.6)
MCHC RBC-ENTMCNC: 29 PG — SIGNIFICANT CHANGE UP (ref 27–34)
MCHC RBC-ENTMCNC: 30 GM/DL — LOW (ref 32–36)
MCV RBC AUTO: 96.7 FL — SIGNIFICANT CHANGE UP (ref 80–100)
NRBC # BLD: 1 /100 WBCS — HIGH (ref 0–0)
PHOSPHATE SERPL-MCNC: 2 MG/DL — LOW (ref 2.5–4.5)
PLATELET # BLD AUTO: 270 K/UL — SIGNIFICANT CHANGE UP (ref 150–400)
POTASSIUM SERPL-MCNC: 4 MMOL/L — SIGNIFICANT CHANGE UP (ref 3.5–5.3)
POTASSIUM SERPL-SCNC: 4 MMOL/L — SIGNIFICANT CHANGE UP (ref 3.5–5.3)
RBC # BLD: 2.69 M/UL — LOW (ref 4.2–5.8)
RBC # FLD: 15.5 % — HIGH (ref 10.3–14.5)
SODIUM SERPL-SCNC: 142 MMOL/L — SIGNIFICANT CHANGE UP (ref 135–145)
WBC # BLD: 10.11 K/UL — SIGNIFICANT CHANGE UP (ref 3.8–10.5)
WBC # FLD AUTO: 10.11 K/UL — SIGNIFICANT CHANGE UP (ref 3.8–10.5)

## 2023-03-14 PROCEDURE — 99232 SBSQ HOSP IP/OBS MODERATE 35: CPT

## 2023-03-14 PROCEDURE — 99233 SBSQ HOSP IP/OBS HIGH 50: CPT

## 2023-03-14 RX ORDER — FUROSEMIDE 40 MG
20 TABLET ORAL DAILY
Refills: 0 | Status: DISCONTINUED | OUTPATIENT
Start: 2023-03-14 | End: 2023-03-19

## 2023-03-14 RX ADMIN — PANTOPRAZOLE SODIUM 40 MILLIGRAM(S): 20 TABLET, DELAYED RELEASE ORAL at 17:17

## 2023-03-14 RX ADMIN — SIMETHICONE 80 MILLIGRAM(S): 80 TABLET, CHEWABLE ORAL at 12:04

## 2023-03-14 RX ADMIN — SIMETHICONE 80 MILLIGRAM(S): 80 TABLET, CHEWABLE ORAL at 17:18

## 2023-03-14 RX ADMIN — PANTOPRAZOLE SODIUM 40 MILLIGRAM(S): 20 TABLET, DELAYED RELEASE ORAL at 06:00

## 2023-03-14 RX ADMIN — Medication 2: at 17:17

## 2023-03-14 RX ADMIN — Medication 400 UNIT(S): at 12:04

## 2023-03-14 RX ADMIN — SIMETHICONE 80 MILLIGRAM(S): 80 TABLET, CHEWABLE ORAL at 05:59

## 2023-03-14 RX ADMIN — Medication 50 MILLIGRAM(S): at 05:58

## 2023-03-14 RX ADMIN — Medication 250 MILLIGRAM(S): at 05:58

## 2023-03-14 RX ADMIN — Medication 250 MILLIGRAM(S): at 17:18

## 2023-03-14 RX ADMIN — Medication 1 TABLET(S): at 12:04

## 2023-03-14 RX ADMIN — ATORVASTATIN CALCIUM 20 MILLIGRAM(S): 80 TABLET, FILM COATED ORAL at 22:10

## 2023-03-14 RX ADMIN — PREGABALIN 1000 MICROGRAM(S): 225 CAPSULE ORAL at 12:04

## 2023-03-14 RX ADMIN — Medication 500 MILLIGRAM(S): at 12:05

## 2023-03-14 RX ADMIN — Medication 1 GRAM(S): at 05:58

## 2023-03-14 RX ADMIN — MONTELUKAST 10 MILLIGRAM(S): 4 TABLET, CHEWABLE ORAL at 12:04

## 2023-03-14 RX ADMIN — CEFEPIME 100 MILLIGRAM(S): 1 INJECTION, POWDER, FOR SOLUTION INTRAMUSCULAR; INTRAVENOUS at 05:59

## 2023-03-14 RX ADMIN — CEFEPIME 100 MILLIGRAM(S): 1 INJECTION, POWDER, FOR SOLUTION INTRAMUSCULAR; INTRAVENOUS at 17:17

## 2023-03-14 RX ADMIN — TIOTROPIUM BROMIDE 2 PUFF(S): 18 CAPSULE ORAL; RESPIRATORY (INHALATION) at 06:52

## 2023-03-14 RX ADMIN — Medication 1 GRAM(S): at 12:04

## 2023-03-14 RX ADMIN — Medication 4: at 07:50

## 2023-03-14 RX ADMIN — INSULIN GLARGINE 15 UNIT(S): 100 INJECTION, SOLUTION SUBCUTANEOUS at 22:10

## 2023-03-14 RX ADMIN — BUDESONIDE AND FORMOTEROL FUMARATE DIHYDRATE 2 PUFF(S): 160; 4.5 AEROSOL RESPIRATORY (INHALATION) at 18:43

## 2023-03-14 RX ADMIN — Medication 50 MILLIGRAM(S): at 17:18

## 2023-03-14 RX ADMIN — Medication 1 GRAM(S): at 17:18

## 2023-03-14 RX ADMIN — TAMSULOSIN HYDROCHLORIDE 0.4 MILLIGRAM(S): 0.4 CAPSULE ORAL at 22:10

## 2023-03-14 RX ADMIN — BUDESONIDE AND FORMOTEROL FUMARATE DIHYDRATE 2 PUFF(S): 160; 4.5 AEROSOL RESPIRATORY (INHALATION) at 06:51

## 2023-03-14 RX ADMIN — Medication 4: at 12:03

## 2023-03-14 NOTE — PROGRESS NOTE ADULT - SUBJECTIVE AND OBJECTIVE BOX
[facility] -- Génesis Villavicencio Pannella, Patel, [facility], [place]  Office # [phone]      Follow Up:    cardiac optimization    Subjective/Observations:   No events overnight resting comfortably in bed.  No complaints of chest pain, dyspnea, or palpitations reported. No signs of orthopnea or PND.  on room air no complaints    REVIEW OF SYSTEMS: All other review of systems is negative unless indicated above    PAST MEDICAL & SURGICAL HISTORY:  Diabetes Mellitus, Type II      CAD (Coronary Artery Disease)  s/p 3v CABG ; stents placed in [place] in 2019      Dyslipidemia      Osteomyelitis      COPD (chronic obstructive pulmonary disease)  on 2L at home and BiPAP at night; intubated       Hypertension      PVD (peripheral vascular disease)      History of PAT (paroxysmal atrial tachycardia)      Asthma with COPD      BPH (benign prostatic hyperplasia)      Acute osteomyelitis      CABG (Coronary Artery Bypass Graft)        Compound fracture  left leg      S/P primary angioplasty with coronary stent      H/O drainage of abscess  Left femur 2021          MEDICATIONS  (STANDING):  ascorbic acid 500 milliGRAM(s) Oral daily  atorvastatin 20 milliGRAM(s) Oral at bedtime  budesonide 160 MICROgram(s)/formoterol 4.5 MICROgram(s) Inhaler 2 Puff(s) Inhalation two times a day  cefepime   IVPB      cefepime   IVPB 2000 milliGRAM(s) IV Intermittent every 12 hours  cholecalciferol 400 Unit(s) Oral daily  cyanocobalamin 1000 MICROGram(s) Oral daily  dextrose 5%. 1000 milliLiter(s) (100 mL/Hr) IV Continuous <Continuous>  dextrose 5%. 1000 milliLiter(s) (50 mL/Hr) IV Continuous <Continuous>  dextrose 50% Injectable 25 Gram(s) IV Push once  dextrose 50% Injectable 12.5 Gram(s) IV Push once  dextrose 50% Injectable 25 Gram(s) IV Push once  furosemide    Tablet 20 milliGRAM(s) Oral daily  glucagon  Injectable 1 milliGRAM(s) IntraMuscular once  insulin glargine Injectable (LANTUS) 15 Unit(s) SubCutaneous at bedtime  insulin lispro (ADMELOG) corrective regimen sliding scale   SubCutaneous three times a day before meals  insulin lispro (ADMELOG) corrective regimen sliding scale   SubCutaneous at bedtime  mepolizumab Subcutaneous Injectable 100 milliGRAM(s) SubCutaneous every 4 weeks  metoprolol tartrate 50 milliGRAM(s) Oral two times a day  montelukast 10 milliGRAM(s) Oral daily  multivitamin 1 Tablet(s) Oral daily  Nephro-carlos 1 Tablet(s) Oral daily  pantoprazole  Injectable 40 milliGRAM(s) IV Push every 12 hours  saccharomyces boulardii 250 milliGRAM(s) Oral two times a day  simethicone 80 milliGRAM(s) Chew every 6 hours  sucralfate 1 Gram(s) Oral four times a day  tamsulosin 0.4 milliGRAM(s) Oral at bedtime  tiotropium 2.5 MICROgram(s) Inhaler 2 Puff(s) Inhalation daily    MEDICATIONS  (PRN):  albuterol/ipratropium for Nebulization 3 milliLiter(s) Nebulizer every 6 hours PRN Shortness of Breath and/or Wheezing  dextrose Oral Gel 15 Gram(s) Oral once PRN Blood Glucose LESS THAN 70 milliGRAM(s)/deciliter      Allergies    [This allergen will not trigger allergy alert] IV DYE, IODINE CONTRAST (Unknown)  [This allergen will not trigger allergy alert] NKDA (Unknown)  latex (Unknown)    Intolerances    shellfish (Nausea)      Vital Signs Last 24 Hrs  T(C): 36.7 (14 Mar 2023 05:51), Max: 37 (13 Mar 2023 12:38)  T(F): 98.1 (14 Mar 2023 05:51), Max: 98.6 (13 Mar 2023 12:38)  HR: 106 (14 Mar 2023 05:55) (72 - 106)  BP: 103/67 (14 Mar 2023 05:55) (98/60 - 124/69)  BP(mean): --  RR: 18 (14 Mar 2023 05:51) (16 - 18)  SpO2: 98% (14 Mar 2023 05:51) (92% - 99%)    Parameters below as of 14 Mar 2023 05:51  Patient On (Oxygen Delivery Method): room air        I&O's Summary    13 Mar 2023 07:01  -  14 Mar 2023 07:00  --------------------------------------------------------  IN: 0 mL / OUT: 600 mL / NET: -600 mL          PHYSICAL EXAM:  TELE: SR   Constitutional: NAD, awake and alert, obese  HEENT: Moist Mucous Membranes, Anicteric  Pulmonary: Non-labored, breath sounds are clear bilaterally, No wheezing, crackles or rhonchi  Cardiovascular: Regular, S1 and S2 nl, No murmurs, rubs, gallops or clicks  Gastrointestinal: Bowel Sounds present, soft, nontender.   Lymph: No lymphadenopathy. No peripheral edema.  Skin: No visible rashes or ulcers.  Psych:  Mood & affect appropriate    LABS: All Labs Reviewed:                        7.8    10.11 )-----------( 270      ( 14 Mar 2023 06:21 )             26.0                         7.7    7.99  )-----------( 228      ( 13 Mar 2023 07:30 )             24.7                         8.2    x     )-----------( x        ( 12 Mar 2023 16:14 )             26.8     14 Mar 2023 06:21    142    |  109    |  21     ----------------------------<  174    4.0     |  29     |  1.10   13 Mar 2023 07:30    140    |  109    |  22     ----------------------------<  153    3.8     |  27     |  1.00   12 Mar 2023 07:43    140    |  110    |  23     ----------------------------<  152    4.1     |  27     |  1.10     Ca    8.9        14 Mar 2023 06:21  Ca    9.0        13 Mar 2023 07:30  Ca    8.6        12 Mar 2023 07:43  Phos  2.0       14 Mar 2023 06:21  Mg     1.6       14 Mar 2023 06:21  Mg     1.8       13 Mar 2023 07:30      PT/INR - ( 13 Mar 2023 07:30 )   PT: 12.0 sec;   INR: 1.03 ratio         PTT - ( 13 Mar 2023 07:30 )  PTT:29.0 sec         EC Lead ECG:   Ventricular Rate 93 BPM    Atrial Rate 93 BPM    P-R Interval 162 ms    QRS Duration 86 ms    Q-T Interval 354 ms    QTC Calculation(Bazett) 440 ms    P Axis 82 degrees    R Axis 83 degrees    T Axis 68 degrees    Diagnosis Line Normal sinus rhythm  Normal ECG  When compared with ECG of 2022 08:19,  No significant change was found  Confirmed by deep Heath ([id number]) on 3/7/2023 2:22:00 PM (23 @ 19:32)       EXAM:  ECHO TTE WO CON COMP W DOPP         PROCEDURE DATE:  2021        INTERPRETATION:  INDICATION: Ischemic heart disease  sonographer KL    Blood Pressure 123/70    Height 180.3 cm     Weight 97.5 kg       BSA 2.2   sq m    Dimensions:  LA 3.0       Normal Values: 2.0 - 4.0 cm  Ao 3.5        Normal Values: 2.0 - 3.8 cm  SEPTUM 1.3       Normal Values: 0.6 - 1.2 cm  PWT 1.0       Normal Values: 0.6 - 1.1 cm  LVIDd 4.3         Normal Values: 3.0 - 5.6 cm  LVIDs 1.8         Normal Values: 1.8 - 4.0 cm      OBSERVATIONS:  Technically difficult and limited study  Mitral Valve: normal, trace physiologic MR.  Aortic Valve/Aorta: Sclerotic trileaflet aortic valve with normal opening.  Tricuspid Valve: Not well-visualized  Pulmonic Valve: Not well-visualized  Left Atrium: normal  Right Atrium: Not well-visualized  Left Ventricle: The left ventricular endocardium is not well-visualized.   Overall normal systolic function with an estimated LVEF of 55%. Cannot   evaluate for segmental abnormalities  Right Ventricle: Not well-visualized  Pericardium: no significant pericardial effusion.  Pulmonary/RV Pressure: estimated PA systolic pressure of 28 mmHg  LV diastolic dysfunction is present        IMPRESSION:  Technically difficult and limited study  The left ventricular endocardium is not well-visualized. Overall normal   systolic function with an estimated LVEF of 55-60%. Cannot evaluate for   segmental abnormalities  The right ventricle is not well-visualized  Sclerotic trileaflet aortic valve, without AI.  Trace physiologic MR  No significant pericardial effusion.    --- End of Report ---              ARISTIDES LEE MD; Attending Cardiologist  This document has been electronically signed. Dec 21 2021  1:38PM      Radiology:

## 2023-03-14 NOTE — PROGRESS NOTE ADULT - SUBJECTIVE AND OBJECTIVE BOX
[facility]  Rich Sky PA-C  [address] [place] [address]  [phone]      INTERVAL HPI/OVERNIGHT EVENTS:  Pt s/e  Reports no new GI events  Wife at bedside reports some dark stool this am    MEDICATIONS  (STANDING):  ascorbic acid 500 milliGRAM(s) Oral daily  atorvastatin 20 milliGRAM(s) Oral at bedtime  budesonide 160 MICROgram(s)/formoterol 4.5 MICROgram(s) Inhaler 2 Puff(s) Inhalation two times a day  cefepime   IVPB      cefepime   IVPB 2000 milliGRAM(s) IV Intermittent every 12 hours  cholecalciferol 400 Unit(s) Oral daily  cyanocobalamin 1000 MICROGram(s) Oral daily  dextrose 5%. 1000 milliLiter(s) (100 mL/Hr) IV Continuous <Continuous>  dextrose 5%. 1000 milliLiter(s) (50 mL/Hr) IV Continuous <Continuous>  dextrose 50% Injectable 25 Gram(s) IV Push once  dextrose 50% Injectable 12.5 Gram(s) IV Push once  dextrose 50% Injectable 25 Gram(s) IV Push once  furosemide    Tablet 20 milliGRAM(s) Oral daily  glucagon  Injectable 1 milliGRAM(s) IntraMuscular once  insulin glargine Injectable (LANTUS) 15 Unit(s) SubCutaneous at bedtime  insulin lispro (ADMELOG) corrective regimen sliding scale   SubCutaneous three times a day before meals  insulin lispro (ADMELOG) corrective regimen sliding scale   SubCutaneous at bedtime  mepolizumab Subcutaneous Injectable 100 milliGRAM(s) SubCutaneous every 4 weeks  metoprolol tartrate 50 milliGRAM(s) Oral two times a day  montelukast 10 milliGRAM(s) Oral daily  multivitamin 1 Tablet(s) Oral daily  Nephro-carlos 1 Tablet(s) Oral daily  pantoprazole  Injectable 40 milliGRAM(s) IV Push every 12 hours  saccharomyces boulardii 250 milliGRAM(s) Oral two times a day  simethicone 80 milliGRAM(s) Chew every 6 hours  sucralfate 1 Gram(s) Oral four times a day  tamsulosin 0.4 milliGRAM(s) Oral at bedtime  tiotropium 2.5 MICROgram(s) Inhaler 2 Puff(s) Inhalation daily    MEDICATIONS  (PRN):  albuterol/ipratropium for Nebulization 3 milliLiter(s) Nebulizer every 6 hours PRN Shortness of Breath and/or Wheezing  dextrose Oral Gel 15 Gram(s) Oral once PRN Blood Glucose LESS THAN 70 milliGRAM(s)/deciliter      Allergies    [This allergen will not trigger allergy alert] IV DYE, IODINE CONTRAST (Unknown)  [This allergen will not trigger allergy alert] NKDA (Unknown)  latex (Unknown)    Intolerances    shellfish (Nausea)      PHYSICAL EXAM:   Vital Signs:  Vital Signs Last 24 Hrs  T(C): 36.7 (14 Mar 2023 11:52), Max: 37 (13 Mar 2023 12:38)  T(F): 98 (14 Mar 2023 11:52), Max: 98.6 (13 Mar 2023 12:38)  HR: 91 (14 Mar 2023 11:52) (72 - 106)  BP: 101/62 (14 Mar 2023 11:52) (100/63 - 124/69)  BP(mean): --  RR: 18 (14 Mar 2023 11:52) (16 - 18)  SpO2: 93% (14 Mar 2023 11:52) (92% - 98%)    Parameters below as of 14 Mar 2023 11:52  Patient On (Oxygen Delivery Method): room air      Daily     Daily     GENERAL:  Appears stated age  HEENT:  NC/AT  CHEST:  Full & symmetric excursion  HEART:  Regular rhythm  ABDOMEN:  Soft, non-tender, non-distended  EXTEREMITIES:  no cyanosis  SKIN:  No rash  NEURO:  Alert      LABS:                        7.8    10.11 )-----------( 270      ( 14 Mar 2023 06:21 )             26.0     03-14    142  |  109<H>  |  21  ----------------------------<  174<H>  4.0   |  29  |  1.10    Ca    8.9      14 Mar 2023 06:21  Phos  2.0     03-14  Mg     1.6     03-14      PT/INR - ( 13 Mar 2023 07:30 )   PT: 12.0 sec;   INR: 1.03 ratio         PTT - ( 13 Mar 2023 07:30 )  PTT:29.0 sec

## 2023-03-14 NOTE — PROGRESS NOTE ADULT - ASSESSMENT
82M, HTN, CABG, COPD/home O2, PVD/LE stents, remote hx L femoral fx/complicated course/multiple procedures/chronic OM - mgmt for:  -chronic L femur OM/L thigh abscess - on IV abxs per ID - ceftriaxone 2g q12h - duration per ID; f/up cxs; pt for IR drainage of abscess once GIB stabilized  -GIB/acute blood loss anemia - s/p multiple transfusion PRBCs - monitoring hgb currently in mid-high 7s range; s/p EGD w/gastritis, gastric  ulcer - continue PPI, carafate  -COPD - stable resp status - monitoring on NC; continue pulm regimen  -CAD, PVD - continue lipitor, metoprolol; holding antiplatelets, a/c  -dvt prophy

## 2023-03-14 NOTE — PROGRESS NOTE ADULT - SUBJECTIVE AND OBJECTIVE BOX
[facility]  INFECTIOUS DISEASES   [address] [place] [address]  Tel: [phone]     Fax: [phone]  ========================================================  MD Noman Perry Kaushal, MD Cho, Michelle, MD Sunjit, Jaspal, MD  ========================================================    [id number]  [facility]     Follow up: left thigh abscess and OM    No fever, some discharge from left thigh.  Had blood in stool and drop in H/H so had transfusion, and EGD yesterday showed gastritis and peptic ulcer.  Now on regular diet, tolerating well.     PAST MEDICAL & SURGICAL HISTORY:  Diabetes Mellitus, Type II  CAD (Coronary Artery Disease)  s/p 3v CABG ; stents placed in [place] in   Dyslipidemia  Osteomyelitis  COPD (chronic obstructive pulmonary disease)  on 2L at home and BiPAP at night; intubated   Hypertension  PVD (peripheral vascular disease)  History of PAT (paroxysmal atrial tachycardia)  Asthma with COPD  BPH (benign prostatic hyperplasia)  Acute osteomyelitis  CABG (Coronary Artery Bypass Graft)    Compound fracture  left leg  S/P primary angioplasty with coronary stent  H/O drainage of abscess  Left femur 2021    Social Hx: No current smoking, ETOH or drugs     FAMILY HISTORY:  Family history of diabetes mellitus (Sibling)    Family hx of lung cancer  brother,  age 82, used to smoke with pt    Allergies  No Known Allergies    Intolerances  shellfish (Nausea)    Antibiotics:  Ciprofloxacin      REVIEW OF SYSTEMS:  CONSTITUTIONAL:  No Fever or chills  HEENT:  No diplopia or blurred vision.  No sore throat or runny nose.  CARDIOVASCULAR:  No chest pain or SOB.  RESPIRATORY:  No cough, shortness of breath, PND or orthopnea.  GASTROINTESTINAL:  No nausea, vomiting or diarrhea.  GENITOURINARY:  No dysuria, frequency or urgency. No Blood in urine  MUSCULOSKELETAL:  left thigh pain and drainage   SKIN:  No change in skin, hair or nails.  NEUROLOGIC:  No paresthesias or weakness.  PSYCHIATRIC:  No disorder of thought or mood.  ENDOCRINE:  No heat or cold intolerance, polyuria or polydipsia.  HEMATOLOGICAL:  No easy bruising or bleeding.     Physical Exam:  Vital Signs Last 24 Hrs  T(C): 36.7 (14 Mar 2023 11:52), Max: 37 (13 Mar 2023 12:38)  T(F): 98 (14 Mar 2023 11:52), Max: 98.6 (13 Mar 2023 12:38)  HR: 91 (14 Mar 2023 11:52) (72 - 106)  BP: 101/62 (14 Mar 2023 11:52) (100/63 - 124/69)  BP(mean): --  RR: 18 (14 Mar 2023 11:52) (16 - 18)  SpO2: 93% (14 Mar 2023 11:52) (92% - 98%)  Parameters below as of 14 Mar 2023 11:52  Patient On (Oxygen Delivery Method): room air  GEN: NAD  HEENT: normocephalic and atraumatic. EOMI. PERRL.    NECK: Supple.  No lymphadenopathy   LUNGS: poor air movement   HEART: Regular rate and rhythm   ABDOMEN: Soft, nontender, and nondistended.  Positive bowel sounds.    : No CVA tenderness  EXTREMITIES: left thigh with scar in lateral side with fluctuation and small opening with yellow discharge   NEUROLOGIC: grossly intact.  PSYCHIATRIC: Appropriate affect .  SKIN: No rash     Labs:                        7.8    10.11 )-----------( 270      ( 14 Mar 2023 06:21 )             26.0         142  |  109<H>  |  21  ----------------------------<  174<H>  4.0   |  29  |  1.10    Ca    8.9      14 Mar 2023 06:21  Phos  2.0       Mg     1.6         Culture - Abscess with Gram Stain (collected 03-10-23 @ 11:50)  Source: .Abscess left leg    Culture - Abscess with Gram Stain (collected 23 @ 13:50)  Source: .Abscess left thigh  Final Report (23 @ 14:49):    Moderate Coag Negative Staphylococcus "Susceptibilities not performed"    Multiple Morphological Strains    Culture - Urine (collected 23 @ 20:15)  Source: Clean Catch Clean Catch (Midstream)  Final Report (23 @ 23:02):    <10,000 CFU/mL Normal Urogenital Shayy    Culture - Blood (collected 23 @ 15:53)  Source: .Blood Blood-Peripheral  Final Report (23 @ 23:01):    No Growth Final    Culture - Blood (collected 23 @ 15:43)  Source: .Blood Blood-Peripheral  Final Report (23 @ 23:01):    No Growth Final    WBC Count: 10.11 K/uL (23 @ 06:21)  WBC Count: 7.99 K/uL (23 @ 07:30)  WBC Count: 8.49 K/uL (23 @ 07:43)  WBC Count: 10.08 K/uL (23 @ 07:12)  WBC Count: 10.63 K/uL (03-10-23 @ 05:00)    Creatinine, Serum: 1.10 mg/dL (23 @ 06:21)  Creatinine, Serum: 1.00 mg/dL (23 @ 07:30)  Creatinine, Serum: 1.10 mg/dL (23 @ 07:43)  Creatinine, Serum: 1.10 mg/dL (23 @ 07:12)  Creatinine, Serum: 1.30 mg/dL (03-10-23 @ 05:00)    C-Reactive Protein, Serum: 45 mg/L (23 @ 15:53)    Sedimentation Rate, Erythrocyte: 39 mm/hr (23 @ 15:53)    COVID-19 PCR: NotDetec (23 @ 18:44)  SARS-CoV-2 Result: NotDetec (23 @ 15:53)    All imaging and other data have been reviewed.  < from: MR Femur No Cont, Left (22 @ 13:40) >  IMPRESSION:  Chronic osteomyelitis with deformity of bone and with scattered areas of   T2 hyperintensity, less prominent than on the previous MRI of 2021,   however cannot exclude superimposed acute osteomyelitis/intraosseous   abscess.  Fluid tract extending from the chronic bony defect is contiguous with a   soft tissue abscess centered deep to the vastus intermedius measuring  12.9 cm craniocaudally, with surrounding surrounding muscle and soft   tissue edema.    Assessment and Plan:   83yo man with PMH of HTN, COPD on home O2, CAD s/p CABG, PVD s/p stents in b/l legs and left femoral fracture more than 50 years ago, was admitted at [facility] in 2021 with left  leg pain on the site of old fracture after CT showed possible intramedullary abscess. He had pain and infection in the old fracture area at least 3-4 times in the past, had multiple surgeries   and drainage of area with a long course of antibiotic treatment.  MRI showed collection, intraosseous abscess  S/P IR drain placement on 21  IR culture NGTD but had another culture with enterobacter.   Completed 6 weeks of ceftriaxone 2gm with PICC line in 2022  He was seen in the clinic and was started on suppressive ciprofloxacin for good oral bioavailability and also based on the culture.  MRI 2022 done showed 12.9cm collection with OM.   He stopped cipro sometimes last year and now feels more pain and swelling in area and started draining again.   Now MRI with 22cm collection.     H/H is stable now, EGD with gastritis and peptic ulcer. No fever, on regular diet.     Recommendations:  - Blood cultures negatie   - Continue on cefepime 2gm q12  - MRI result noted, IR has been consulted for possible drain placement   - Wound discharge sent for culture twice both times with skin shayy, CoNS and corynebacterium   - GI work up noted s/o EGD yesterday with gastritis and peptic ulcer     Will follow.  Discussed with his wife at the bedside.     Brandi العلي MD  Division of Infectious Diseases   Please call ID service at [phone] with any question.      35 minutes spent on total encounter assessing patient, examination, chart review, counseling and coordinating care by the attending physician/nurse/care manager.

## 2023-03-14 NOTE — PROGRESS NOTE ADULT - ASSESSMENT
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     OM  COURTNEY  Asthma  COPD  HTN  CAD  CABG hx    s/p EGD  PUD  Hiatal hernia  on ABX    NIV use night time and prn - 18/8 and 25 pct fio2  sleep hygiene reviewed  [facility] rx regimen  BP control  pt is on Nucala in the office - Monthly with Dr Oscar Marcelino - [facility]  on Symbicort - Spiriva - Singulair - copd - asthma -   NEBS PRN  monitor VS and Sat  keep sat > 88 pct  spoke with pt - wife  outpatient records reviewed  emp ABX in progress - ID follow up  skin - wound care

## 2023-03-14 NOTE — PROGRESS NOTE ADULT - SUBJECTIVE AND OBJECTIVE BOX
Date/Time Patient Seen:  		  Referring MD:   Data Reviewed	       Patient is a 82y old  Male who presents with a chief complaint of Left  thigh draining sinus (13 Mar 2023 13:15)      Subjective/HPI     PAST MEDICAL & SURGICAL HISTORY:  Diabetes Mellitus, Type II    CAD (Coronary Artery Disease)  s/p 3v CABG 2004; stents placed in [place] in 2019    Dyslipidemia    Asymptomatic Peripheral Vascular Disease    Osteomyelitis    COPD (chronic obstructive pulmonary disease)  on 2L at home and BiPAP at night; intubated 6/18    Diabetes mellitus    Hypertension    PVD (peripheral vascular disease)    History of PAT (paroxysmal atrial tachycardia)    Asthma with COPD    BPH (benign prostatic hyperplasia)    Acute osteomyelitis    CABG (Coronary Artery Bypass Graft)  2004    Compound fracture  left leg    S/P primary angioplasty with coronary stent    H/O drainage of abscess  Left femur 12/2021          Medication list         MEDICATIONS  (STANDING):  ascorbic acid 500 milliGRAM(s) Oral daily  atorvastatin 20 milliGRAM(s) Oral at bedtime  budesonide 160 MICROgram(s)/formoterol 4.5 MICROgram(s) Inhaler 2 Puff(s) Inhalation two times a day  cefepime   IVPB      cefepime   IVPB 2000 milliGRAM(s) IV Intermittent every 12 hours  cholecalciferol 400 Unit(s) Oral daily  cyanocobalamin 1000 MICROGram(s) Oral daily  dextrose 5%. 1000 milliLiter(s) (100 mL/Hr) IV Continuous <Continuous>  dextrose 5%. 1000 milliLiter(s) (50 mL/Hr) IV Continuous <Continuous>  dextrose 50% Injectable 25 Gram(s) IV Push once  dextrose 50% Injectable 12.5 Gram(s) IV Push once  dextrose 50% Injectable 25 Gram(s) IV Push once  furosemide    Tablet 20 milliGRAM(s) Oral daily  glucagon  Injectable 1 milliGRAM(s) IntraMuscular once  insulin glargine Injectable (LANTUS) 15 Unit(s) SubCutaneous at bedtime  insulin lispro (ADMELOG) corrective regimen sliding scale   SubCutaneous at bedtime  insulin lispro (ADMELOG) corrective regimen sliding scale   SubCutaneous three times a day before meals  mepolizumab Subcutaneous Injectable 100 milliGRAM(s) SubCutaneous every 4 weeks  metoprolol tartrate 50 milliGRAM(s) Oral two times a day  montelukast 10 milliGRAM(s) Oral daily  multivitamin 1 Tablet(s) Oral daily  Nephro-carlos 1 Tablet(s) Oral daily  pantoprazole  Injectable 40 milliGRAM(s) IV Push every 12 hours  saccharomyces boulardii 250 milliGRAM(s) Oral two times a day  simethicone 80 milliGRAM(s) Chew every 6 hours  sucralfate 1 Gram(s) Oral four times a day  tamsulosin 0.4 milliGRAM(s) Oral at bedtime  tiotropium 2.5 MICROgram(s) Inhaler 2 Puff(s) Inhalation daily    MEDICATIONS  (PRN):  albuterol/ipratropium for Nebulization 3 milliLiter(s) Nebulizer every 6 hours PRN Shortness of Breath and/or Wheezing  dextrose Oral Gel 15 Gram(s) Oral once PRN Blood Glucose LESS THAN 70 milliGRAM(s)/deciliter         Vitals log        ICU Vital Signs Last 24 Hrs  T(C): 36.7 (14 Mar 2023 05:51), Max: 37 (13 Mar 2023 12:38)  T(F): 98.1 (14 Mar 2023 05:51), Max: 98.6 (13 Mar 2023 12:38)  HR: 106 (14 Mar 2023 05:55) (72 - 106)  BP: 103/67 (14 Mar 2023 05:55) (98/60 - 124/69)  BP(mean): --  ABP: --  ABP(mean): --  RR: 18 (14 Mar 2023 05:51) (16 - 18)  SpO2: 98% (14 Mar 2023 05:51) (92% - 99%)    O2 Parameters below as of 14 Mar 2023 05:51  Patient On (Oxygen Delivery Method): room air                 Input and Output:  I&O's Detail    12 Mar 2023 07:01  -  13 Mar 2023 07:00  --------------------------------------------------------  IN:    IV PiggyBack: 50 mL    Oral Fluid: 240 mL  Total IN: 290 mL    OUT:    Voided (mL): 1000 mL  Total OUT: 1000 mL    Total NET: -710 mL      13 Mar 2023 07:01  -  14 Mar 2023 06:12  --------------------------------------------------------  IN:  Total IN: 0 mL    OUT:    Voided (mL): 600 mL  Total OUT: 600 mL    Total NET: -600 mL          Lab Data                        7.7    7.99  )-----------( 228      ( 13 Mar 2023 07:30 )             24.7     03-13    140  |  109<H>  |  22  ----------------------------<  153<H>  3.8   |  27  |  1.00    Ca    9.0      13 Mar 2023 07:30  Mg     1.8     03-13              Review of Systems	      Objective     Physical Examination      heart s1s2  lung dec BS  head nc    Pertinent Lab findings & Imaging      Eliecer:  NO   Adequate UO     I&O's Detail    12 Mar 2023 07:01  -  13 Mar 2023 07:00  --------------------------------------------------------  IN:    IV PiggyBack: 50 mL    Oral Fluid: 240 mL  Total IN: 290 mL    OUT:    Voided (mL): 1000 mL  Total OUT: 1000 mL    Total NET: -710 mL      13 Mar 2023 07:01  -  14 Mar 2023 06:12  --------------------------------------------------------  IN:  Total IN: 0 mL    OUT:    Voided (mL): 600 mL  Total OUT: 600 mL    Total NET: -600 mL               Discussed with:     Cultures:	        Radiology

## 2023-03-14 NOTE — PROGRESS NOTE ADULT - ASSESSMENT
anemia  GIB    s/p egd 3/13 showing gastric ulcer; gastritis; gastric ulcer; hiatal hernia    check cbc  ppi once a day  carafate 1 gr po qd  f/u bx  monitor for overt GI bleeding    I reviewed the overnight course of events on the unit, re-confirming the patient history. I discussed the care with the patient and their family  Differential diagnosis and plan of care discussed with patient after the evaluation  50 minutes spent on total encounter of which more than fifty percent of the encounter was spent counseling and/or coordinating care by the attending physician.  Advanced care planning was discussed with patient and family.  Advanced care planning forms were reviewed and discussed.  Risks, benefits and alternatives of gastroenterologic procedures were discussed in detail and all questions were answered.

## 2023-03-14 NOTE — PROGRESS NOTE ADULT - ASSESSMENT
82 yr male with history of Hypertension, COPD home oxygen dependent, CAD CABG , chronic left femor osteomyelitis since traumatic left femoral fracture in 1966 presents with new yellowish  drainage from left thigh. Cardiology consulted for clearance for GI scopy.     Cardiac clearance HTN, CAD, CABG  - known CAD, CABG  - EKG showed SR @ 93.   - No evidence of any active ischemia  - Holding aspirin 2/2 GI bleed  - Continue statin and BB  - H/o atrial paroxysmal tachycardia.   - Previous TTE 12/21 showed overall normal systolic function with an estimated LVEF of 55-60%.   - Pt sees Dr Breaux for cardiology.   - Continue Lasix 20 mg PO daily  - No evidence of any meaningful volume overload   -sp EGD gastritis, gastric ulcer, hiatal hernia, follow up GI recs when okay to resume ac   - given cad transfuse to keep hct > 26   - SCDs for DVT prophylaxis   - Monitor and replete lytes, keep K>4, Mg>2.  Aziza Portillo FNP-C  Cardiology NP  [facility] [phone]

## 2023-03-14 NOTE — PROGRESS NOTE ADULT - SUBJECTIVE AND OBJECTIVE BOX
CC/F/U for:     HPI:  82 yr male with history of Hypertension, COPD home oxygen dependent, CAD CABG , chronic left femor osteomyelitis since traumatic left femoral fracture in 1966.   Patient follow with ID dr العلي for mgt of his chronic femoral osteomyelitis . Has abscess and drainage in November 2022. Present with one day of new yellowish  drainage from left thigh. No fever or chills or other constitutional symptoms.  (06 Mar 2023 20:35)        INTERVAL HPI/OVERNIGHT EVENTS:  Pt seen and examined at bedside.     Allergies/Intolerance: [This allergen will not trigger allergy alert] IV DYE, IODINE CONTRAST (Unknown)  [This allergen will not trigger allergy alert] NKDA (Unknown)  latex (Unknown)  shellfish (Nausea)      MEDICATIONS  (STANDING):  ascorbic acid 500 milliGRAM(s) Oral daily  atorvastatin 20 milliGRAM(s) Oral at bedtime  budesonide 160 MICROgram(s)/formoterol 4.5 MICROgram(s) Inhaler 2 Puff(s) Inhalation two times a day  cefepime   IVPB      cefepime   IVPB 2000 milliGRAM(s) IV Intermittent every 12 hours  cholecalciferol 400 Unit(s) Oral daily  cyanocobalamin 1000 MICROGram(s) Oral daily  dextrose 5%. 1000 milliLiter(s) (100 mL/Hr) IV Continuous <Continuous>  dextrose 5%. 1000 milliLiter(s) (50 mL/Hr) IV Continuous <Continuous>  dextrose 50% Injectable 25 Gram(s) IV Push once  dextrose 50% Injectable 12.5 Gram(s) IV Push once  dextrose 50% Injectable 25 Gram(s) IV Push once  furosemide    Tablet 20 milliGRAM(s) Oral daily  glucagon  Injectable 1 milliGRAM(s) IntraMuscular once  insulin glargine Injectable (LANTUS) 15 Unit(s) SubCutaneous at bedtime  insulin lispro (ADMELOG) corrective regimen sliding scale   SubCutaneous three times a day before meals  insulin lispro (ADMELOG) corrective regimen sliding scale   SubCutaneous at bedtime  mepolizumab Subcutaneous Injectable 100 milliGRAM(s) SubCutaneous every 4 weeks  metoprolol tartrate 50 milliGRAM(s) Oral two times a day  montelukast 10 milliGRAM(s) Oral daily  multivitamin 1 Tablet(s) Oral daily  Nephro-carlos 1 Tablet(s) Oral daily  pantoprazole  Injectable 40 milliGRAM(s) IV Push every 12 hours  saccharomyces boulardii 250 milliGRAM(s) Oral two times a day  simethicone 80 milliGRAM(s) Chew every 6 hours  sucralfate 1 Gram(s) Oral four times a day  tamsulosin 0.4 milliGRAM(s) Oral at bedtime  tiotropium 2.5 MICROgram(s) Inhaler 2 Puff(s) Inhalation daily    MEDICATIONS  (PRN):  albuterol/ipratropium for Nebulization 3 milliLiter(s) Nebulizer every 6 hours PRN Shortness of Breath and/or Wheezing  dextrose Oral Gel 15 Gram(s) Oral once PRN Blood Glucose LESS THAN 70 milliGRAM(s)/deciliter        ROS: as above; all other systems reviewed and wnl      PHYSICAL EXAMINATION:  Vital Signs Last 24 Hrs  T(C): 36.7 (14 Mar 2023 11:52), Max: 36.7 (14 Mar 2023 05:51)  T(F): 98 (14 Mar 2023 11:52), Max: 98.1 (14 Mar 2023 05:51)  HR: 91 (14 Mar 2023 11:52) (89 - 106)  BP: 101/62 (14 Mar 2023 11:52) (100/63 - 124/69)  BP(mean): --  RR: 18 (14 Mar 2023 11:52) (18 - 18)  SpO2: 93% (14 Mar 2023 11:52) (92% - 98%)    Parameters below as of 14 Mar 2023 11:52  Patient On (Oxygen Delivery Method): room air      CAPILLARY BLOOD GLUCOSE      POCT Blood Glucose.: 244 mg/dL (14 Mar 2023 11:35)  POCT Blood Glucose.: 205 mg/dL (14 Mar 2023 07:34)  POCT Blood Glucose.: 254 mg/dL (13 Mar 2023 21:05)  POCT Blood Glucose.: 202 mg/dL (13 Mar 2023 16:54)      GENERAL: NAD  HEENT: mucous membranes dry  RESP: respirations unlabored  HEART: HR s  ABDOMEN: soft, ND, NT   EXTREMITIES:  no edema b/l LEs, no calf tenderness  NEURO: awake, alert, interactive; moves all extremities      LABS:                        7.8    10.11 )-----------( 270      ( 14 Mar 2023 06:21 )             26.0     03-14    142  |  109<H>  |  21  ----------------------------<  174<H>  4.0   |  29  |  1.10    Ca    8.9      14 Mar 2023 06:21  Phos  2.0     03-14  Mg     1.6     03-14      PT/INR - ( 13 Mar 2023 07:30 )   PT: 12.0 sec;   INR: 1.03 ratio         PTT - ( 13 Mar 2023 07:30 )  PTT:29.0 sec        RADIOLOGY & ADDITIONAL TESTS:      ASSESSMENT AND PLAN:  82y Male CC/F/U for: chronic L thigh OM, GIB    HPI:  82 yr male with history of Hypertension, COPD home oxygen dependent, CAD CABG , chronic left femor osteomyelitis since traumatic left femoral fracture in 1966.   Patient follow with ID dr العلي for mgt of his chronic femoral osteomyelitis . Has abscess and drainage in November 2022. Present with one day of new yellowish  drainage from left thigh. No fever or chills or other constitutional symptoms.  (06 Mar 2023 20:35)        INTERVAL HPI/OVERNIGHT EVENTS:  Pt seen and examined at bedside - wife at bedside; continues on IV abxs.     Allergies/Intolerance: [This allergen will not trigger allergy alert] IV DYE, IODINE CONTRAST (Unknown)  [This allergen will not trigger allergy alert] NKDA (Unknown)  latex (Unknown)  shellfish (Nausea)      MEDICATIONS  (STANDING):  ascorbic acid 500 milliGRAM(s) Oral daily  atorvastatin 20 milliGRAM(s) Oral at bedtime  budesonide 160 MICROgram(s)/formoterol 4.5 MICROgram(s) Inhaler 2 Puff(s) Inhalation two times a day  cefepime   IVPB      cefepime   IVPB 2000 milliGRAM(s) IV Intermittent every 12 hours  cholecalciferol 400 Unit(s) Oral daily  cyanocobalamin 1000 MICROGram(s) Oral daily  dextrose 5%. 1000 milliLiter(s) (100 mL/Hr) IV Continuous <Continuous>  dextrose 5%. 1000 milliLiter(s) (50 mL/Hr) IV Continuous <Continuous>  dextrose 50% Injectable 25 Gram(s) IV Push once  dextrose 50% Injectable 12.5 Gram(s) IV Push once  dextrose 50% Injectable 25 Gram(s) IV Push once  furosemide    Tablet 20 milliGRAM(s) Oral daily  glucagon  Injectable 1 milliGRAM(s) IntraMuscular once  insulin glargine Injectable (LANTUS) 15 Unit(s) SubCutaneous at bedtime  insulin lispro (ADMELOG) corrective regimen sliding scale   SubCutaneous three times a day before meals  insulin lispro (ADMELOG) corrective regimen sliding scale   SubCutaneous at bedtime  mepolizumab Subcutaneous Injectable 100 milliGRAM(s) SubCutaneous every 4 weeks  metoprolol tartrate 50 milliGRAM(s) Oral two times a day  montelukast 10 milliGRAM(s) Oral daily  multivitamin 1 Tablet(s) Oral daily  Nephro-carlos 1 Tablet(s) Oral daily  pantoprazole  Injectable 40 milliGRAM(s) IV Push every 12 hours  saccharomyces boulardii 250 milliGRAM(s) Oral two times a day  simethicone 80 milliGRAM(s) Chew every 6 hours  sucralfate 1 Gram(s) Oral four times a day  tamsulosin 0.4 milliGRAM(s) Oral at bedtime  tiotropium 2.5 MICROgram(s) Inhaler 2 Puff(s) Inhalation daily    MEDICATIONS  (PRN):  albuterol/ipratropium for Nebulization 3 milliLiter(s) Nebulizer every 6 hours PRN Shortness of Breath and/or Wheezing  dextrose Oral Gel 15 Gram(s) Oral once PRN Blood Glucose LESS THAN 70 milliGRAM(s)/deciliter        ROS: as above; all other systems reviewed and wnl      PHYSICAL EXAMINATION:  Vital Signs Last 24 Hrs  T(C): 36.7 (14 Mar 2023 11:52), Max: 36.7 (14 Mar 2023 05:51)  T(F): 98 (14 Mar 2023 11:52), Max: 98.1 (14 Mar 2023 05:51)  HR: 91 (14 Mar 2023 11:52) (89 - 106)  BP: 101/62 (14 Mar 2023 11:52) (100/63 - 124/69)  BP(mean): --  RR: 18 (14 Mar 2023 11:52) (18 - 18)  SpO2: 93% (14 Mar 2023 11:52) (92% - 98%)    Parameters below as of 14 Mar 2023 11:52  Patient On (Oxygen Delivery Method): room air      CAPILLARY BLOOD GLUCOSE      POCT Blood Glucose.: 244 mg/dL (14 Mar 2023 11:35)  POCT Blood Glucose.: 205 mg/dL (14 Mar 2023 07:34)  POCT Blood Glucose.: 254 mg/dL (13 Mar 2023 21:05)  POCT Blood Glucose.: 202 mg/dL (13 Mar 2023 16:54)      GENERAL: elderly male, in NAD  HEENT: mucous membranes dry  RESP: respirations unlabored  HEART: HR 90s  ABDOMEN: soft, ND, NT   EXTREMITIES:  no edema b/l LEs, no calf tenderness; L thigh wound bandages in place  NEURO: awake, alert, interactive; moves all extremities      LABS:                        7.8    10.11 )-----------( 270      ( 14 Mar 2023 06:21 )             26.0     03-14    142  |  109<H>  |  21  ----------------------------<  174<H>  4.0   |  29  |  1.10    Ca    8.9      14 Mar 2023 06:21  Phos  2.0     03-14  Mg     1.6     03-14      PT/INR - ( 13 Mar 2023 07:30 )   PT: 12.0 sec;   INR: 1.03 ratio         PTT - ( 13 Mar 2023 07:30 )  PTT:29.0 sec

## 2023-03-15 LAB
ANION GAP SERPL CALC-SCNC: 3 MMOL/L — LOW (ref 5–17)
BUN SERPL-MCNC: 16 MG/DL — SIGNIFICANT CHANGE UP (ref 7–23)
CALCIUM SERPL-MCNC: 9.1 MG/DL — SIGNIFICANT CHANGE UP (ref 8.5–10.1)
CHLORIDE SERPL-SCNC: 110 MMOL/L — HIGH (ref 96–108)
CO2 SERPL-SCNC: 29 MMOL/L — SIGNIFICANT CHANGE UP (ref 22–31)
CREAT SERPL-MCNC: 1.1 MG/DL — SIGNIFICANT CHANGE UP (ref 0.5–1.3)
CULTURE RESULTS: SIGNIFICANT CHANGE UP
EGFR: 67 ML/MIN/1.73M2 — SIGNIFICANT CHANGE UP
GLUCOSE SERPL-MCNC: 158 MG/DL — HIGH (ref 70–99)
HCT VFR BLD CALC: 23.9 % — LOW (ref 39–50)
HCT VFR BLD CALC: 24.7 % — LOW (ref 39–50)
HGB BLD-MCNC: 7.2 G/DL — LOW (ref 13–17)
HGB BLD-MCNC: 7.6 G/DL — LOW (ref 13–17)
MCHC RBC-ENTMCNC: 28.7 PG — SIGNIFICANT CHANGE UP (ref 27–34)
MCHC RBC-ENTMCNC: 30.1 GM/DL — LOW (ref 32–36)
MCV RBC AUTO: 95.2 FL — SIGNIFICANT CHANGE UP (ref 80–100)
NRBC # BLD: 1 /100 WBCS — HIGH (ref 0–0)
PLATELET # BLD AUTO: 247 K/UL — SIGNIFICANT CHANGE UP (ref 150–400)
POTASSIUM SERPL-MCNC: 3.7 MMOL/L — SIGNIFICANT CHANGE UP (ref 3.5–5.3)
POTASSIUM SERPL-SCNC: 3.7 MMOL/L — SIGNIFICANT CHANGE UP (ref 3.5–5.3)
RBC # BLD: 2.51 M/UL — LOW (ref 4.2–5.8)
RBC # FLD: 15.9 % — HIGH (ref 10.3–14.5)
SODIUM SERPL-SCNC: 142 MMOL/L — SIGNIFICANT CHANGE UP (ref 135–145)
SPECIMEN SOURCE: SIGNIFICANT CHANGE UP
WBC # BLD: 8.81 K/UL — SIGNIFICANT CHANGE UP (ref 3.8–10.5)
WBC # FLD AUTO: 8.81 K/UL — SIGNIFICANT CHANGE UP (ref 3.8–10.5)

## 2023-03-15 PROCEDURE — 99232 SBSQ HOSP IP/OBS MODERATE 35: CPT

## 2023-03-15 PROCEDURE — 99233 SBSQ HOSP IP/OBS HIGH 50: CPT

## 2023-03-15 PROCEDURE — 93010 ELECTROCARDIOGRAM REPORT: CPT

## 2023-03-15 RX ADMIN — Medication 1 GRAM(S): at 17:16

## 2023-03-15 RX ADMIN — INSULIN GLARGINE 15 UNIT(S): 100 INJECTION, SOLUTION SUBCUTANEOUS at 22:17

## 2023-03-15 RX ADMIN — Medication 1 TABLET(S): at 11:49

## 2023-03-15 RX ADMIN — Medication 500 MILLIGRAM(S): at 11:49

## 2023-03-15 RX ADMIN — Medication 250 MILLIGRAM(S): at 05:30

## 2023-03-15 RX ADMIN — ATORVASTATIN CALCIUM 20 MILLIGRAM(S): 80 TABLET, FILM COATED ORAL at 22:08

## 2023-03-15 RX ADMIN — SIMETHICONE 80 MILLIGRAM(S): 80 TABLET, CHEWABLE ORAL at 00:06

## 2023-03-15 RX ADMIN — Medication 1 TABLET(S): at 11:50

## 2023-03-15 RX ADMIN — Medication 400 UNIT(S): at 11:49

## 2023-03-15 RX ADMIN — Medication 4: at 17:14

## 2023-03-15 RX ADMIN — SIMETHICONE 80 MILLIGRAM(S): 80 TABLET, CHEWABLE ORAL at 11:47

## 2023-03-15 RX ADMIN — Medication 2: at 07:56

## 2023-03-15 RX ADMIN — SIMETHICONE 80 MILLIGRAM(S): 80 TABLET, CHEWABLE ORAL at 17:15

## 2023-03-15 RX ADMIN — CEFEPIME 100 MILLIGRAM(S): 1 INJECTION, POWDER, FOR SOLUTION INTRAMUSCULAR; INTRAVENOUS at 05:30

## 2023-03-15 RX ADMIN — SIMETHICONE 80 MILLIGRAM(S): 80 TABLET, CHEWABLE ORAL at 05:30

## 2023-03-15 RX ADMIN — PANTOPRAZOLE SODIUM 40 MILLIGRAM(S): 20 TABLET, DELAYED RELEASE ORAL at 17:16

## 2023-03-15 RX ADMIN — BUDESONIDE AND FORMOTEROL FUMARATE DIHYDRATE 2 PUFF(S): 160; 4.5 AEROSOL RESPIRATORY (INHALATION) at 22:09

## 2023-03-15 RX ADMIN — Medication 1 GRAM(S): at 05:30

## 2023-03-15 RX ADMIN — Medication 250 MILLIGRAM(S): at 17:15

## 2023-03-15 RX ADMIN — TIOTROPIUM BROMIDE 2 PUFF(S): 18 CAPSULE ORAL; RESPIRATORY (INHALATION) at 05:34

## 2023-03-15 RX ADMIN — PANTOPRAZOLE SODIUM 40 MILLIGRAM(S): 20 TABLET, DELAYED RELEASE ORAL at 05:29

## 2023-03-15 RX ADMIN — Medication 6: at 11:46

## 2023-03-15 RX ADMIN — MONTELUKAST 10 MILLIGRAM(S): 4 TABLET, CHEWABLE ORAL at 11:47

## 2023-03-15 RX ADMIN — PREGABALIN 1000 MICROGRAM(S): 225 CAPSULE ORAL at 11:48

## 2023-03-15 RX ADMIN — Medication 1 GRAM(S): at 11:48

## 2023-03-15 RX ADMIN — BUDESONIDE AND FORMOTEROL FUMARATE DIHYDRATE 2 PUFF(S): 160; 4.5 AEROSOL RESPIRATORY (INHALATION) at 05:34

## 2023-03-15 RX ADMIN — TAMSULOSIN HYDROCHLORIDE 0.4 MILLIGRAM(S): 0.4 CAPSULE ORAL at 22:08

## 2023-03-15 RX ADMIN — Medication 1 GRAM(S): at 00:06

## 2023-03-15 NOTE — PROGRESS NOTE ADULT - ASSESSMENT
82M, HTN, CABG, COPD/home O2, PVD/LE stents, remote hx L femoral fx/complicated course/multiple procedures/chronic OM - mgmt for:    chronic L femur OM/L thigh abscess - on IV abxs per ID - cefepime 2g q12h - duration per ID; f/up cxs; pt for IR drainage of abscess once GIB stabilized  GIB/acute blood loss anemia - s/p multiple transfusion PRBCs - monitoring hgb currently in mid-high 7s range; s/p EGD w/gastritis, gastric  ulcer. Patient with notedd bright red blood per rectum: plan: PPI, carafate. GI followup today 3/15; Tranfuse 1unit of prbc 3/15  COPD - stable resp status - monitoring on NC; continue pulm regimen  CAD, PVD - continue lipitor, metoprolol; holding antiplatelets, a/c. Cardiology consult appreciated.   plan: 3/15: check EKG for chest pain and dyspnea  dvt prophy

## 2023-03-15 NOTE — PROGRESS NOTE ADULT - ASSESSMENT
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     OM  COURTNEY  Asthma  COPD  HTN  CAD  CABG hx    PO LASIX  s/p EGD  PUD  Hiatal hernia  on ABX    NIV use night time and prn - 18/8 and 25 pct fio2  sleep hygiene reviewed  cvs rx regimen  BP control  pt is on Nucala in the office - Monthly with Dr Oscar Marcelino - [facility]  on Symbicort - Spiriva - Singulair - copd - asthma -   NEBS PRN  monitor VS and Sat  keep sat > 88 pct  spoke with pt - wife  outpatient records reviewed  emp ABX in progress - ID follow up  skin - wound care

## 2023-03-15 NOTE — PROGRESS NOTE ADULT - ASSESSMENT
anemia  GIB    s/p egd 3/13 showing gastric ulcer; gastritis; gastric ulcer; hiatal hernia    check cbc  ppi BID  carafate 1 gr po qd  f/u bx  monitor for overt GI bleeding    I reviewed the overnight course of events on the unit, re-confirming the patient history. I discussed the care with the patient and their family  Differential diagnosis and plan of care discussed with patient after the evaluation  50 minutes spent on total encounter of which more than fifty percent of the encounter was spent counseling and/or coordinating care by the attending physician.  Advanced care planning was discussed with patient and family.  Advanced care planning forms were reviewed and discussed.  Risks, benefits and alternatives of gastroenterologic procedures were discussed in detail and all questions were answered.

## 2023-03-15 NOTE — PROGRESS NOTE ADULT - SUBJECTIVE AND OBJECTIVE BOX
Date/Time Patient Seen:  		  Referring MD:   Data Reviewed	       Patient is a 82y old  Male who presents with a chief complaint of Left  thigh draining sinus (14 Mar 2023 16:22)      Subjective/HPI     PAST MEDICAL & SURGICAL HISTORY:  Diabetes Mellitus, Type II    CAD (Coronary Artery Disease)  s/p 3v CABG 2004; stents placed in [place] in 2019    Dyslipidemia    Asymptomatic Peripheral Vascular Disease    Osteomyelitis    COPD (chronic obstructive pulmonary disease)  on 2L at home and BiPAP at night; intubated 6/18    Diabetes mellitus    Hypertension    PVD (peripheral vascular disease)    History of PAT (paroxysmal atrial tachycardia)    Asthma with COPD    BPH (benign prostatic hyperplasia)    Acute osteomyelitis    CABG (Coronary Artery Bypass Graft)  2004    Compound fracture  left leg    S/P primary angioplasty with coronary stent    H/O drainage of abscess  Left femur 12/2021          Medication list         MEDICATIONS  (STANDING):  ascorbic acid 500 milliGRAM(s) Oral daily  atorvastatin 20 milliGRAM(s) Oral at bedtime  budesonide 160 MICROgram(s)/formoterol 4.5 MICROgram(s) Inhaler 2 Puff(s) Inhalation two times a day  cefepime   IVPB      cefepime   IVPB 2000 milliGRAM(s) IV Intermittent every 12 hours  cholecalciferol 400 Unit(s) Oral daily  cyanocobalamin 1000 MICROGram(s) Oral daily  dextrose 5%. 1000 milliLiter(s) (100 mL/Hr) IV Continuous <Continuous>  dextrose 5%. 1000 milliLiter(s) (50 mL/Hr) IV Continuous <Continuous>  dextrose 50% Injectable 25 Gram(s) IV Push once  dextrose 50% Injectable 12.5 Gram(s) IV Push once  dextrose 50% Injectable 25 Gram(s) IV Push once  furosemide    Tablet 20 milliGRAM(s) Oral daily  glucagon  Injectable 1 milliGRAM(s) IntraMuscular once  insulin glargine Injectable (LANTUS) 15 Unit(s) SubCutaneous at bedtime  insulin lispro (ADMELOG) corrective regimen sliding scale   SubCutaneous at bedtime  insulin lispro (ADMELOG) corrective regimen sliding scale   SubCutaneous three times a day before meals  mepolizumab Subcutaneous Injectable 100 milliGRAM(s) SubCutaneous every 4 weeks  metoprolol tartrate 50 milliGRAM(s) Oral two times a day  montelukast 10 milliGRAM(s) Oral daily  multivitamin 1 Tablet(s) Oral daily  Nephro-carlos 1 Tablet(s) Oral daily  pantoprazole  Injectable 40 milliGRAM(s) IV Push every 12 hours  saccharomyces boulardii 250 milliGRAM(s) Oral two times a day  simethicone 80 milliGRAM(s) Chew every 6 hours  sucralfate 1 Gram(s) Oral four times a day  tamsulosin 0.4 milliGRAM(s) Oral at bedtime  tiotropium 2.5 MICROgram(s) Inhaler 2 Puff(s) Inhalation daily    MEDICATIONS  (PRN):  albuterol/ipratropium for Nebulization 3 milliLiter(s) Nebulizer every 6 hours PRN Shortness of Breath and/or Wheezing  dextrose Oral Gel 15 Gram(s) Oral once PRN Blood Glucose LESS THAN 70 milliGRAM(s)/deciliter         Vitals log        ICU Vital Signs Last 24 Hrs  T(C): 36.8 (15 Mar 2023 04:37), Max: 36.8 (15 Mar 2023 04:37)  T(F): 98.3 (15 Mar 2023 04:37), Max: 98.3 (15 Mar 2023 04:37)  HR: 95 (15 Mar 2023 04:37) (90 - 106)  BP: 100/61 (15 Mar 2023 04:37) (100/61 - 115/67)  BP(mean): --  ABP: --  ABP(mean): --  RR: 18 (15 Mar 2023 04:37) (18 - 18)  SpO2: 92% (15 Mar 2023 04:37) (92% - 98%)    O2 Parameters below as of 15 Mar 2023 04:37  Patient On (Oxygen Delivery Method): room air                 Input and Output:  I&O's Detail    13 Mar 2023 07:01  -  14 Mar 2023 07:00  --------------------------------------------------------  IN:  Total IN: 0 mL    OUT:    Voided (mL): 600 mL  Total OUT: 600 mL    Total NET: -600 mL          Lab Data                        7.8    10.11 )-----------( 270      ( 14 Mar 2023 06:21 )             26.0     03-14    142  |  109<H>  |  21  ----------------------------<  174<H>  4.0   |  29  |  1.10    Ca    8.9      14 Mar 2023 06:21  Phos  2.0     03-14  Mg     1.6     03-14              Review of Systems	      Objective     Physical Examination    heart s1s2  lung dec BS  head nc      Pertinent Lab findings & Imaging      Eliecer:  NO   Adequate UO     I&O's Detail    13 Mar 2023 07:01  -  14 Mar 2023 07:00  --------------------------------------------------------  IN:  Total IN: 0 mL    OUT:    Voided (mL): 600 mL  Total OUT: 600 mL    Total NET: -600 mL               Discussed with:     Cultures:	        Radiology

## 2023-03-15 NOTE — PROGRESS NOTE ADULT - ASSESSMENT
82 yr male with history of Hypertension, COPD home oxygen dependent, CAD CABG , chronic left femor osteomyelitis since traumatic left femoral fracture in 1966 presents with new yellowish  drainage from left thigh. Cardiology consulted for clearance for GI scopy.     Cardiac clearance HTN, CAD, CABG  - known CAD, CABG  - EKG showed SR @ 93.   - No evidence of any active ischemia  - Holding aspirin 2/2 GI bleed  - Continue statin and BB  - H/o atrial paroxysmal tachycardia.   - Previous TTE 12/21 showed overall normal systolic function with an estimated LVEF of 55-60%.   - Pt sees Dr Breaux for cardiology.   - Continue Lasix 20 mg PO daily  - No evidence of any meaningful volume overload   -sp EGD gastritis, gastric ulcer, hiatal hernia, follow up GI recs when okay to resume ac   - given cad transfuse to keep hct > 26   - SCDs for DVT prophylaxis   - Monitor and replete lytes, keep K>4, Mg>2.  Aziza Portillo FNP-C  Cardiology NP  [facility] [phone]     82 yr male with history of Hypertension, COPD home oxygen dependent, CAD CABG , chronic left femor osteomyelitis since traumatic left femoral fracture in 1966 presents with new yellowish  drainage from left thigh. Cardiology consulted for clearance for GI scopy.     Cardiac clearance HTN, CAD, CABG  - known CAD, CABG  - EKG showed SR @ 93.   - No evidence of any active ischemia  - Holding aspirin 2/2 GI bleed  - Continue statin and BB  - H/o atrial paroxysmal tachycardia.   - Previous TTE 12/21 showed overall normal systolic function with an estimated LVEF of 55-60%.   - Pt sees Dr Breaux for cardiology.   - Continue Lasix 20 mg PO daily  - No evidence of any meaningful volume overload laying flat   -sp EGD gastritis, gastric ulcer, hiatal hernia, follow up GI recs when okay to resume ac   - given cad transfuse to keep hct > 26   - SCDs for DVT prophylaxis   - Monitor and replete lytes, keep K>4, Mg>2.  Aziza Portillo FNP-C  Cardiology NP  [facility] [phone]

## 2023-03-15 NOTE — PROGRESS NOTE ADULT - SUBJECTIVE AND OBJECTIVE BOX
[facility]  Rich Sky PA-C  [address] [place] [address]  [phone]      INTERVAL HPI/OVERNIGHT EVENTS:  Pt s/e  Reports no new GI events    MEDICATIONS  (STANDING):  ascorbic acid 500 milliGRAM(s) Oral daily  atorvastatin 20 milliGRAM(s) Oral at bedtime  budesonide 160 MICROgram(s)/formoterol 4.5 MICROgram(s) Inhaler 2 Puff(s) Inhalation two times a day  cefepime   IVPB      cefepime   IVPB 2000 milliGRAM(s) IV Intermittent every 12 hours  cholecalciferol 400 Unit(s) Oral daily  cyanocobalamin 1000 MICROGram(s) Oral daily  dextrose 5%. 1000 milliLiter(s) (100 mL/Hr) IV Continuous <Continuous>  dextrose 5%. 1000 milliLiter(s) (50 mL/Hr) IV Continuous <Continuous>  dextrose 50% Injectable 25 Gram(s) IV Push once  dextrose 50% Injectable 12.5 Gram(s) IV Push once  dextrose 50% Injectable 25 Gram(s) IV Push once  furosemide    Tablet 20 milliGRAM(s) Oral daily  glucagon  Injectable 1 milliGRAM(s) IntraMuscular once  insulin glargine Injectable (LANTUS) 15 Unit(s) SubCutaneous at bedtime  insulin lispro (ADMELOG) corrective regimen sliding scale   SubCutaneous at bedtime  insulin lispro (ADMELOG) corrective regimen sliding scale   SubCutaneous three times a day before meals  mepolizumab Subcutaneous Injectable 100 milliGRAM(s) SubCutaneous every 4 weeks  metoprolol tartrate 50 milliGRAM(s) Oral two times a day  montelukast 10 milliGRAM(s) Oral daily  multivitamin 1 Tablet(s) Oral daily  Nephro-carlos 1 Tablet(s) Oral daily  pantoprazole  Injectable 40 milliGRAM(s) IV Push every 12 hours  saccharomyces boulardii 250 milliGRAM(s) Oral two times a day  simethicone 80 milliGRAM(s) Chew every 6 hours  sucralfate 1 Gram(s) Oral four times a day  tamsulosin 0.4 milliGRAM(s) Oral at bedtime  tiotropium 2.5 MICROgram(s) Inhaler 2 Puff(s) Inhalation daily    MEDICATIONS  (PRN):  albuterol/ipratropium for Nebulization 3 milliLiter(s) Nebulizer every 6 hours PRN Shortness of Breath and/or Wheezing  dextrose Oral Gel 15 Gram(s) Oral once PRN Blood Glucose LESS THAN 70 milliGRAM(s)/deciliter      Allergies    [This allergen will not trigger allergy alert] IV DYE, IODINE CONTRAST (Unknown)  [This allergen will not trigger allergy alert] NKDA (Unknown)  latex (Unknown)    Intolerances    shellfish (Nausea)      PHYSICAL EXAM:   Vital Signs:  Vital Signs Last 24 Hrs  T(C): 36.9 (15 Mar 2023 09:19), Max: 36.9 (15 Mar 2023 09:19)  T(F): 98.5 (15 Mar 2023 09:19), Max: 98.5 (15 Mar 2023 09:19)  HR: 92 (15 Mar 2023 09:19) (90 - 95)  BP: 111/55 (15 Mar 2023 09:19) (100/61 - 115/67)  BP(mean): --  RR: 16 (15 Mar 2023 09:19) (16 - 18)  SpO2: 92% (15 Mar 2023 09:19) (92% - 93%)    Parameters below as of 15 Mar 2023 09:19  Patient On (Oxygen Delivery Method): room air      Daily     Daily Weight in k.5 (15 Mar 2023 04:37)    GENERAL:  Appears stated age  HEENT:  NC/AT  CHEST:  Full & symmetric excursion  HEART:  Regular rhythm  ABDOMEN:  Soft, non-tender, non-distended  EXTEREMITIES:  no cyanosis  SKIN:  No rash  NEURO:  Alert      LABS:                        7.2    8.81  )-----------( 247      ( 15 Mar 2023 08:10 )             23.9     03-15    142  |  110<H>  |  16  ----------------------------<  158<H>  3.7   |  29  |  1.10    Ca    9.1      15 Mar 2023 08:10  Phos  2.0     03-14  Mg     1.6     03-14

## 2023-03-15 NOTE — PROGRESS NOTE ADULT - SUBJECTIVE AND OBJECTIVE BOX
[facility]  INFECTIOUS DISEASES   [address] [place] [address]  Tel: [phone]     Fax: [phone]  ========================================================  MD Noman Prery Kaushal, MD Cho, Michelle, MD Sunjit, Jaspal, MD  ========================================================    [id number]  [facility]     Follow up: left thigh abscess and OM    No fever, some discharge from left thigh.  Had blood in stool and drop in H/H so had transfusion, and EGD on 3/13 showed gastritis and peptic ulcer.  This morning had a very large bloody BM. There is more drop in H/H.  On O2 with NC.     PAST MEDICAL & SURGICAL HISTORY:  Diabetes Mellitus, Type II  CAD (Coronary Artery Disease)  s/p 3v CABG ; stents placed in [place] in   Dyslipidemia  Osteomyelitis  COPD (chronic obstructive pulmonary disease)  on 2L at home and BiPAP at night; intubated   Hypertension  PVD (peripheral vascular disease)  History of PAT (paroxysmal atrial tachycardia)  Asthma with COPD  BPH (benign prostatic hyperplasia)  Acute osteomyelitis  CABG (Coronary Artery Bypass Graft)    Compound fracture  left leg  S/P primary angioplasty with coronary stent  H/O drainage of abscess  Left femur 2021    Social Hx: No current smoking, ETOH or drugs     FAMILY HISTORY:  Family history of diabetes mellitus (Sibling)    Family hx of lung cancer  brother,  age 82, used to smoke with pt    Allergies  No Known Allergies    Intolerances  shellfish (Nausea)    Antibiotics:  Ciprofloxacin      REVIEW OF SYSTEMS:  CONSTITUTIONAL:  No Fever or chills  HEENT:  No diplopia or blurred vision.  No sore throat or runny nose.  CARDIOVASCULAR:  No chest pain or SOB.  RESPIRATORY:  No cough, shortness of breath, PND or orthopnea.  GASTROINTESTINAL:  No nausea, vomiting or diarrhea.  GENITOURINARY:  No dysuria, frequency or urgency. No Blood in urine  MUSCULOSKELETAL:  left thigh pain and drainage   SKIN:  No change in skin, hair or nails.  NEUROLOGIC:  No paresthesias or weakness.  PSYCHIATRIC:  No disorder of thought or mood.  ENDOCRINE:  No heat or cold intolerance, polyuria or polydipsia.  HEMATOLOGICAL:  No easy bruising or bleeding.     Physical Exam:  Vital Signs Last 24 Hrs  T(C): 36.9 (15 Mar 2023 09:19), Max: 36.9 (15 Mar 2023 09:19)  T(F): 98.5 (15 Mar 2023 09:19), Max: 98.5 (15 Mar 2023 09:19)  HR: 92 (15 Mar 2023 09:19) (90 - 95)  BP: 111/55 (15 Mar 2023 09:19) (100/61 - 115/67)  BP(mean): --  RR: 16 (15 Mar 2023 09:19) (16 - 18)  SpO2: 92% (15 Mar 2023 09:19) (92% - 93%)  Parameters below as of 15 Mar 2023 09:19  Patient On (Oxygen Delivery Method): room air  GEN: NAD  HEENT: normocephalic and atraumatic. EOMI. PERRL.    NECK: Supple.  No lymphadenopathy   LUNGS: poor air movement   HEART: Regular rate and rhythm   ABDOMEN: Soft, nontender, and nondistended.  Positive bowel sounds.    : No CVA tenderness  EXTREMITIES: left thigh with scar in lateral side with fluctuation and small opening with yellow discharge   NEUROLOGIC: grossly intact.  PSYCHIATRIC: Appropriate affect .  SKIN: No rash     Labs:                        7.2    8.81  )-----------( 247      ( 15 Mar 2023 08:10 )             23.9     -15    142  |  110<H>  |  16  ----------------------------<  158<H>  3.7   |  29  |  1.10    Ca    9.1      15 Mar 2023 08:10  Phos  2.0     -  Mg     1.6         Culture - Abscess with Gram Stain (collected 03-10-23 @ 11:50)  Source: .Abscess left leg    Culture - Abscess with Gram Stain (collected 23 @ 13:50)  Source: .Abscess left thigh  Final Report (23 @ 14:49):    Moderate Coag Negative Staphylococcus "Susceptibilities not performed"    Multiple Morphological Strains    Culture - Urine (collected 23 @ 20:15)  Source: Clean Catch Clean Catch (Midstream)  Final Report (23 @ 23:02):    <10,000 CFU/mL Normal Urogenital Shayy    Culture - Blood (collected 23 @ 15:53)  Source: .Blood Blood-Peripheral  Final Report (23 23:01):    No Growth Final    Culture - Blood (collected 23 @ 15:43)  Source: .Blood Blood-Peripheral  Final Report (23 @ 23:01):    No Growth Final    WBC Count: 8.81 K/uL (03-15-23 @ 08:10)  WBC Count: 10.11 K/uL (23 @ 06:21)  WBC Count: 7.99 K/uL (23 @ 07:30)  WBC Count: 8.49 K/uL (23 @ 07:43)  WBC Count: 10.08 K/uL (23 @ 07:12)    Creatinine, Serum: 1.10 mg/dL (03-15-23 @ 08:10)  Creatinine, Serum: 1.10 mg/dL (23 @ 06:21)  Creatinine, Serum: 1.00 mg/dL (23 @ 07:30)  Creatinine, Serum: 1.10 mg/dL (23 @ 07:43)  Creatinine, Serum: 1.10 mg/dL (23 @ 07:12)    C-Reactive Protein, Serum: 45 mg/L (23 @ 15:53)    Sedimentation Rate, Erythrocyte: 39 mm/hr (23 @ 15:53)     COVID-19 PCR: NotDetec (23 @ 18:44)  SARS-CoV-2 Result: NotDetec (23 @ 15:53)    All imaging and other data have been reviewed.  < from: MR Femur No Cont, Left (22 @ 13:40) >  IMPRESSION:  Chronic osteomyelitis with deformity of bone and with scattered areas of   T2 hyperintensity, less prominent than on the previous MRI of 2021,   however cannot exclude superimposed acute osteomyelitis/intraosseous   abscess.  Fluid tract extending from the chronic bony defect is contiguous with a   soft tissue abscess centered deep to the vastus intermedius measuring  12.9 cm craniocaudally, with surrounding surrounding muscle and soft   tissue edema.    Assessment and Plan:   83yo man with PMH of HTN, COPD on home O2, CAD s/p CABG, PVD s/p stents in b/l legs and left femoral fracture more than 50 years ago, was admitted at [facility] in 2021 with left  leg pain on the site of old fracture after CT showed possible intramedullary abscess. He had pain and infection in the old fracture area at least 3-4 times in the past, had multiple surgeries   and drainage of area with a long course of antibiotic treatment.  MRI showed collection, intraosseous abscess  S/P IR drain placement on 21  IR culture NGTD but had another culture with enterobacter.   Completed 6 weeks of ceftriaxone 2gm with PICC line in 2022  He was seen in the clinic and was started on suppressive ciprofloxacin for good oral bioavailability and also based on the culture.  MRI 2022 done showed 12.9cm collection with OM.   He stopped cipro sometimes last year and now feels more pain and swelling in area and started draining again.   Now MRI with 22cm collection.     A large bloody BM this morning, more drop in H/H. Left leg drain in on hold.     Recommendations:  - Blood cultures negative   - On cefepime 2gm q12 (possibly can hold it for now since not septic, until drain is placed)   - MRI result noted, IR has been consulted for possible drain placement but for now on hold for GI bleed   - Wound discharge sent for culture twice both times with skin shayy, CoNS and corynebacterium   - GI work up noted s/p EGD on 3/13with gastritis and peptic ulcer     Will follow.  Discussed with his wife at the bedside.     Brandi العلي MD  Division of Infectious Diseases   Please call ID service at [phone] with any question.      35 minutes spent on total encounter assessing patient, examination, chart review, counseling and coordinating care by the attending physician/nurse/care manager.

## 2023-03-15 NOTE — PROGRESS NOTE ADULT - SUBJECTIVE AND OBJECTIVE BOX
Patient seen and examined  no overnight events    today; patient reports extertional dyspnea, chest pain  noted BRBPR and maroon stools today  vitals stable  labs: hb 7.2 today  patient on IV cefepime and PO lasix  Cards/ Pulm/GI consult appreciated    Review of Systems:  General:denies fever chills, headache, weakness  HEENT: denies blurry vision,diffculty swallowing, difficulty hearing, tinnitus  Cardiovascular: denies chest pain  ,palpitations  Pulmonary:+ shortness of breath, cough, wheezing, hemoptysis  Gastrointestinal: denies abdominal pain, constipation, diarrhea,nausea , vomiting, +hematochezia  : denies hematuria, dysuria, or incontinence  Neurological: denies weakness, numbness , tingling, dizziness, tremors  MSK: denies muscle pain, difficulty ambulating, swelling, back pain  skin: denies skin rash, itching, burning, or  skin lesions  Psychiatrical: denies mood disturbances, anxierty, feeling depressed, depression , or difficulty sleeping    Objective:  Vitals  T(C): 36.9 (03-15-23 @ 09:19), Max: 36.9 (03-15-23 @ 09:19)  HR: 92 (03-15-23 @ 09:19) (90 - 95)  BP: 111/55 (03-15-23 @ 09:19) (100/61 - 115/67)  RR: 16 (03-15-23 @ 09:19) (16 - 18)  SpO2: 92% (03-15-23 @ 09:19) (92% - 93%)    Physical Exam:  General: comfortable, no acute distress  HEENT: Atraumatic, no LAD, trachea midline, PERRLA  Cardiovascular: normal s1s2, no murmurs, gallops or fricition rubs  Pulmonary: clear to ausculation Bilaterally, no wheezing , rhonchi  Gastrointestinal: soft non tender non distended, no masses felt, no organomegally  Muscloskeletal: no lower extremity edema, intact bilateral lower extremity pulses  Neurological: CN II-12 intact. No focal weakness  Psychiatrical: normal mood, cooperative  SKIN: no rash, lesions or ulcers    Labs:                          7.2    8.81  )-----------( 247      ( 15 Mar 2023 08:10 )             23.9     03-15    142  |  110<H>  |  16  ----------------------------<  158<H>  3.7   |  29  |  1.10    Ca    9.1      15 Mar 2023 08:10  Phos  2.0     03-14  Mg     1.6     03-14              Active Medications  MEDICATIONS  (STANDING):  ascorbic acid 500 milliGRAM(s) Oral daily  atorvastatin 20 milliGRAM(s) Oral at bedtime  budesonide 160 MICROgram(s)/formoterol 4.5 MICROgram(s) Inhaler 2 Puff(s) Inhalation two times a day  cefepime   IVPB      cefepime   IVPB 2000 milliGRAM(s) IV Intermittent every 12 hours  cholecalciferol 400 Unit(s) Oral daily  cyanocobalamin 1000 MICROGram(s) Oral daily  dextrose 5%. 1000 milliLiter(s) (100 mL/Hr) IV Continuous <Continuous>  dextrose 5%. 1000 milliLiter(s) (50 mL/Hr) IV Continuous <Continuous>  dextrose 50% Injectable 25 Gram(s) IV Push once  dextrose 50% Injectable 12.5 Gram(s) IV Push once  dextrose 50% Injectable 25 Gram(s) IV Push once  furosemide    Tablet 20 milliGRAM(s) Oral daily  glucagon  Injectable 1 milliGRAM(s) IntraMuscular once  insulin glargine Injectable (LANTUS) 15 Unit(s) SubCutaneous at bedtime  insulin lispro (ADMELOG) corrective regimen sliding scale   SubCutaneous at bedtime  insulin lispro (ADMELOG) corrective regimen sliding scale   SubCutaneous three times a day before meals  mepolizumab Subcutaneous Injectable 100 milliGRAM(s) SubCutaneous every 4 weeks  metoprolol tartrate 50 milliGRAM(s) Oral two times a day  montelukast 10 milliGRAM(s) Oral daily  multivitamin 1 Tablet(s) Oral daily  Nephro-carlos 1 Tablet(s) Oral daily  pantoprazole  Injectable 40 milliGRAM(s) IV Push every 12 hours  saccharomyces boulardii 250 milliGRAM(s) Oral two times a day  simethicone 80 milliGRAM(s) Chew every 6 hours  sucralfate 1 Gram(s) Oral four times a day  tamsulosin 0.4 milliGRAM(s) Oral at bedtime  tiotropium 2.5 MICROgram(s) Inhaler 2 Puff(s) Inhalation daily    MEDICATIONS  (PRN):  albuterol/ipratropium for Nebulization 3 milliLiter(s) Nebulizer every 6 hours PRN Shortness of Breath and/or Wheezing  dextrose Oral Gel 15 Gram(s) Oral once PRN Blood Glucose LESS THAN 70 milliGRAM(s)/deciliter

## 2023-03-16 ENCOUNTER — TRANSCRIPTION ENCOUNTER (OUTPATIENT)
Age: 83
End: 2023-03-16

## 2023-03-16 LAB
ANION GAP SERPL CALC-SCNC: 2 MMOL/L — LOW (ref 5–17)
BASOPHILS # BLD AUTO: 0.02 K/UL — SIGNIFICANT CHANGE UP (ref 0–0.2)
BASOPHILS NFR BLD AUTO: 0.2 % — SIGNIFICANT CHANGE UP (ref 0–2)
BUN SERPL-MCNC: 14 MG/DL — SIGNIFICANT CHANGE UP (ref 7–23)
CALCIUM SERPL-MCNC: 8.8 MG/DL — SIGNIFICANT CHANGE UP (ref 8.5–10.1)
CHLORIDE SERPL-SCNC: 110 MMOL/L — HIGH (ref 96–108)
CO2 SERPL-SCNC: 31 MMOL/L — SIGNIFICANT CHANGE UP (ref 22–31)
CREAT SERPL-MCNC: 0.94 MG/DL — SIGNIFICANT CHANGE UP (ref 0.5–1.3)
EGFR: 80 ML/MIN/1.73M2 — SIGNIFICANT CHANGE UP
EOSINOPHIL # BLD AUTO: 0.04 K/UL — SIGNIFICANT CHANGE UP (ref 0–0.5)
EOSINOPHIL NFR BLD AUTO: 0.5 % — SIGNIFICANT CHANGE UP (ref 0–6)
GLUCOSE SERPL-MCNC: 131 MG/DL — HIGH (ref 70–99)
HCT VFR BLD CALC: 24.8 % — LOW (ref 39–50)
HGB BLD-MCNC: 7.4 G/DL — LOW (ref 13–17)
IMM GRANULOCYTES NFR BLD AUTO: 1.3 % — HIGH (ref 0–0.9)
LYMPHOCYTES # BLD AUTO: 1.39 K/UL — SIGNIFICANT CHANGE UP (ref 1–3.3)
LYMPHOCYTES # BLD AUTO: 16.7 % — SIGNIFICANT CHANGE UP (ref 13–44)
MAGNESIUM SERPL-MCNC: 1.5 MG/DL — LOW (ref 1.6–2.6)
MCHC RBC-ENTMCNC: 28.5 PG — SIGNIFICANT CHANGE UP (ref 27–34)
MCHC RBC-ENTMCNC: 29.8 GM/DL — LOW (ref 32–36)
MCV RBC AUTO: 95.4 FL — SIGNIFICANT CHANGE UP (ref 80–100)
MONOCYTES # BLD AUTO: 0.94 K/UL — HIGH (ref 0–0.9)
MONOCYTES NFR BLD AUTO: 11.3 % — SIGNIFICANT CHANGE UP (ref 2–14)
NEUTROPHILS # BLD AUTO: 5.8 K/UL — SIGNIFICANT CHANGE UP (ref 1.8–7.4)
NEUTROPHILS NFR BLD AUTO: 70 % — SIGNIFICANT CHANGE UP (ref 43–77)
NRBC # BLD: 1 /100 WBCS — HIGH (ref 0–0)
PHOSPHATE SERPL-MCNC: 2.4 MG/DL — LOW (ref 2.5–4.5)
PLATELET # BLD AUTO: 250 K/UL — SIGNIFICANT CHANGE UP (ref 150–400)
POTASSIUM SERPL-MCNC: 3.6 MMOL/L — SIGNIFICANT CHANGE UP (ref 3.5–5.3)
POTASSIUM SERPL-SCNC: 3.6 MMOL/L — SIGNIFICANT CHANGE UP (ref 3.5–5.3)
RBC # BLD: 2.6 M/UL — LOW (ref 4.2–5.8)
RBC # FLD: 16.6 % — HIGH (ref 10.3–14.5)
SARS-COV-2 RNA SPEC QL NAA+PROBE: SIGNIFICANT CHANGE UP
SODIUM SERPL-SCNC: 143 MMOL/L — SIGNIFICANT CHANGE UP (ref 135–145)
WBC # BLD: 8.3 K/UL — SIGNIFICANT CHANGE UP (ref 3.8–10.5)
WBC # FLD AUTO: 8.3 K/UL — SIGNIFICANT CHANGE UP (ref 3.8–10.5)

## 2023-03-16 PROCEDURE — 99232 SBSQ HOSP IP/OBS MODERATE 35: CPT

## 2023-03-16 PROCEDURE — 99233 SBSQ HOSP IP/OBS HIGH 50: CPT

## 2023-03-16 PROCEDURE — 76882 US LMTD JT/FCL EVL NVASC XTR: CPT | Mod: 26,LT

## 2023-03-16 RX ORDER — SOD SULF/SODIUM/NAHCO3/KCL/PEG
1000 SOLUTION, RECONSTITUTED, ORAL ORAL EVERY 4 HOURS
Refills: 0 | Status: COMPLETED | OUTPATIENT
Start: 2023-03-16 | End: 2023-03-16

## 2023-03-16 RX ADMIN — Medication 50 MILLIGRAM(S): at 17:23

## 2023-03-16 RX ADMIN — INSULIN GLARGINE 15 UNIT(S): 100 INJECTION, SOLUTION SUBCUTANEOUS at 21:24

## 2023-03-16 RX ADMIN — PANTOPRAZOLE SODIUM 40 MILLIGRAM(S): 20 TABLET, DELAYED RELEASE ORAL at 21:23

## 2023-03-16 RX ADMIN — Medication 1 GRAM(S): at 23:51

## 2023-03-16 RX ADMIN — Medication 1 GRAM(S): at 00:24

## 2023-03-16 RX ADMIN — Medication 400 UNIT(S): at 11:12

## 2023-03-16 RX ADMIN — Medication 1 TABLET(S): at 11:12

## 2023-03-16 RX ADMIN — Medication 250 MILLIGRAM(S): at 17:26

## 2023-03-16 RX ADMIN — SIMETHICONE 80 MILLIGRAM(S): 80 TABLET, CHEWABLE ORAL at 11:16

## 2023-03-16 RX ADMIN — TAMSULOSIN HYDROCHLORIDE 0.4 MILLIGRAM(S): 0.4 CAPSULE ORAL at 21:24

## 2023-03-16 RX ADMIN — Medication 2: at 07:50

## 2023-03-16 RX ADMIN — TIOTROPIUM BROMIDE 2 PUFF(S): 18 CAPSULE ORAL; RESPIRATORY (INHALATION) at 05:23

## 2023-03-16 RX ADMIN — BUDESONIDE AND FORMOTEROL FUMARATE DIHYDRATE 2 PUFF(S): 160; 4.5 AEROSOL RESPIRATORY (INHALATION) at 20:57

## 2023-03-16 RX ADMIN — SIMETHICONE 80 MILLIGRAM(S): 80 TABLET, CHEWABLE ORAL at 17:27

## 2023-03-16 RX ADMIN — MONTELUKAST 10 MILLIGRAM(S): 4 TABLET, CHEWABLE ORAL at 11:12

## 2023-03-16 RX ADMIN — Medication 50 MILLIGRAM(S): at 05:25

## 2023-03-16 RX ADMIN — PANTOPRAZOLE SODIUM 40 MILLIGRAM(S): 20 TABLET, DELAYED RELEASE ORAL at 05:23

## 2023-03-16 RX ADMIN — Medication 1 GRAM(S): at 11:12

## 2023-03-16 RX ADMIN — Medication 4: at 12:04

## 2023-03-16 RX ADMIN — Medication 1 GRAM(S): at 17:24

## 2023-03-16 RX ADMIN — Medication 1 TABLET(S): at 11:13

## 2023-03-16 RX ADMIN — Medication 1000 MILLILITER(S): at 18:16

## 2023-03-16 RX ADMIN — SIMETHICONE 80 MILLIGRAM(S): 80 TABLET, CHEWABLE ORAL at 05:22

## 2023-03-16 RX ADMIN — ATORVASTATIN CALCIUM 20 MILLIGRAM(S): 80 TABLET, FILM COATED ORAL at 21:24

## 2023-03-16 RX ADMIN — SIMETHICONE 80 MILLIGRAM(S): 80 TABLET, CHEWABLE ORAL at 23:51

## 2023-03-16 RX ADMIN — Medication 10 MILLIGRAM(S): at 20:57

## 2023-03-16 RX ADMIN — Medication 1000 MILLILITER(S): at 21:28

## 2023-03-16 RX ADMIN — PREGABALIN 1000 MICROGRAM(S): 225 CAPSULE ORAL at 11:12

## 2023-03-16 RX ADMIN — BUDESONIDE AND FORMOTEROL FUMARATE DIHYDRATE 2 PUFF(S): 160; 4.5 AEROSOL RESPIRATORY (INHALATION) at 05:24

## 2023-03-16 RX ADMIN — Medication 20 MILLIGRAM(S): at 05:23

## 2023-03-16 RX ADMIN — SIMETHICONE 80 MILLIGRAM(S): 80 TABLET, CHEWABLE ORAL at 00:25

## 2023-03-16 RX ADMIN — Medication 4: at 17:14

## 2023-03-16 RX ADMIN — Medication 500 MILLIGRAM(S): at 11:12

## 2023-03-16 RX ADMIN — Medication 10 MILLIGRAM(S): at 17:14

## 2023-03-16 RX ADMIN — Medication 250 MILLIGRAM(S): at 05:23

## 2023-03-16 RX ADMIN — Medication 1 GRAM(S): at 05:22

## 2023-03-16 NOTE — PROGRESS NOTE ADULT - ASSESSMENT
anemia  GIB    s/p egd 3/13 showing gastric ulcer; gastritis; gastric ulcer; hiatal hernia  Still having bloody BM  Hgb without appropriate rise after last pRBC  Ordered another unit pRBC  Plan for colonoscopy tomorrow  Clear liquid diet  NPO after midnight  Bowel prep ordered  Cardiac and medical clearance  D/w pt and wife  Will follow    I reviewed the overnight course of events on the unit, re-confirming the patient history. I discussed the care with the patient and their family  Differential diagnosis and plan of care discussed with patient after the evaluation  50 minutes spent on total encounter of which more than fifty percent of the encounter was spent counseling and/or coordinating care by the attending physician.  Advanced care planning was discussed with patient and family.  Advanced care planning forms were reviewed and discussed.  Risks, benefits and alternatives of gastroenterologic procedures were discussed in detail and all questions were answered.   anemia  GIB    s/p egd 3/13 showing gastric ulcer; gastritis; gastric ulcer; hiatal hernia  Still having bloody BM  Hgb without appropriate rise after last pRBC  Ordered another unit pRBC  Keep Hgb >8  Plan for colonoscopy tomorrow  Clear liquid diet  NPO after midnight  Bowel prep ordered  Cardiac and medical clearance  D/w pt and wife  Will follow    I reviewed the overnight course of events on the unit, re-confirming the patient history. I discussed the care with the patient and their family  Differential diagnosis and plan of care discussed with patient after the evaluation  50 minutes spent on total encounter of which more than fifty percent of the encounter was spent counseling and/or coordinating care by the attending physician.  Advanced care planning was discussed with patient and family.  Advanced care planning forms were reviewed and discussed.  Risks, benefits and alternatives of gastroenterologic procedures were discussed in detail and all questions were answered.

## 2023-03-16 NOTE — PROGRESS NOTE ADULT - SUBJECTIVE AND OBJECTIVE BOX
[facility]  Rich Sky PA-C  [address] [place] [address]  [phone]      INTERVAL HPI/OVERNIGHT EVENTS:  Pt s/e  Yesterday had large bloody BM  S/p 1U pRBC, hgb without appropriate rise  Currently reports no GI complaints    MEDICATIONS  (STANDING):  ascorbic acid 500 milliGRAM(s) Oral daily  atorvastatin 20 milliGRAM(s) Oral at bedtime  bisacodyl 10 milliGRAM(s) Oral every 4 hours  budesonide 160 MICROgram(s)/formoterol 4.5 MICROgram(s) Inhaler 2 Puff(s) Inhalation two times a day  cholecalciferol 400 Unit(s) Oral daily  cyanocobalamin 1000 MICROGram(s) Oral daily  dextrose 5%. 1000 milliLiter(s) (50 mL/Hr) IV Continuous <Continuous>  dextrose 5%. 1000 milliLiter(s) (100 mL/Hr) IV Continuous <Continuous>  dextrose 50% Injectable 25 Gram(s) IV Push once  dextrose 50% Injectable 12.5 Gram(s) IV Push once  dextrose 50% Injectable 25 Gram(s) IV Push once  furosemide    Tablet 20 milliGRAM(s) Oral daily  glucagon  Injectable 1 milliGRAM(s) IntraMuscular once  insulin glargine Injectable (LANTUS) 15 Unit(s) SubCutaneous at bedtime  insulin lispro (ADMELOG) corrective regimen sliding scale   SubCutaneous at bedtime  insulin lispro (ADMELOG) corrective regimen sliding scale   SubCutaneous three times a day before meals  mepolizumab Subcutaneous Injectable 100 milliGRAM(s) SubCutaneous every 4 weeks  metoprolol tartrate 50 milliGRAM(s) Oral two times a day  montelukast 10 milliGRAM(s) Oral daily  multivitamin 1 Tablet(s) Oral daily  Nephro-carlos 1 Tablet(s) Oral daily  pantoprazole  Injectable 40 milliGRAM(s) IV Push every 12 hours  polyethylene glycol/electrolyte Solution 1000 milliLiter(s) Oral every 4 hours  saccharomyces boulardii 250 milliGRAM(s) Oral two times a day  simethicone 80 milliGRAM(s) Chew every 6 hours  sucralfate 1 Gram(s) Oral four times a day  tamsulosin 0.4 milliGRAM(s) Oral at bedtime  tiotropium 2.5 MICROgram(s) Inhaler 2 Puff(s) Inhalation daily    MEDICATIONS  (PRN):  albuterol/ipratropium for Nebulization 3 milliLiter(s) Nebulizer every 6 hours PRN Shortness of Breath and/or Wheezing  dextrose Oral Gel 15 Gram(s) Oral once PRN Blood Glucose LESS THAN 70 milliGRAM(s)/deciliter      Allergies    [This allergen will not trigger allergy alert] IV DYE, IODINE CONTRAST (Unknown)  [This allergen will not trigger allergy alert] NKDA (Unknown)  latex (Unknown)    Intolerances    shellfish (Nausea)      PHYSICAL EXAM:   Vital Signs:  Vital Signs Last 24 Hrs  T(C): 36.5 (16 Mar 2023 05:42), Max: 36.9 (15 Mar 2023 12:11)  T(F): 97.7 (16 Mar 2023 05:42), Max: 98.4 (15 Mar 2023 12:11)  HR: 96 (16 Mar 2023 05:42) (88 - 98)  BP: 123/70 (16 Mar 2023 05:42) (96/60 - 123/70)  BP(mean): --  RR: 16 (16 Mar 2023 05:42) (16 - 18)  SpO2: 98% (16 Mar 2023 07:46) (92% - 98%)    Parameters below as of 16 Mar 2023 07:46  Patient On (Oxygen Delivery Method): room air    GENERAL:  Appears stated age  HEENT:  NC/AT  CHEST:  Full & symmetric excursion  HEART:  Regular rhythm  ABDOMEN:  Soft, non-tender, non-distended  EXTEREMITIES:  no cyanosis  SKIN:  No rash  NEURO:  Alert      LABS:                        7.4    8.30  )-----------( 250      ( 16 Mar 2023 05:25 )             24.8     03-16    143  |  110<H>  |  14  ----------------------------<  131<H>  3.6   |  31  |  0.94    Ca    8.8      16 Mar 2023 05:25  Phos  2.4     03-16  Mg     1.5     03-16

## 2023-03-16 NOTE — PROGRESS NOTE ADULT - ASSESSMENT
82M, HTN, CABG, COPD/home O2, PVD/LE stents, remote hx L femoral fx/complicated course/multiple procedures/chronic OM - mgmt for:    chronic L femur OM/L thigh abscess - on IV abxs per ID - cefepime 2g q12h - duration per ID; f/up cxs; pt for IR drainage of abscess once GIB stabilized; DISCUSSED WITH IR 3.15, IR will review case and likely attempt drain today  GIB/acute blood loss anemia - s/p multiple transfusion PRBCs - monitoring hgb currently in mid-high 7s range; s/p EGD w/gastritis, gastric  ulcer. Patient with notedd bright red blood per rectum: plan: PPI, carafate. GI followup today 3/15; Tranfuse 1unit of prbc 3/15: still low: will give another unit  COPD - stable resp status - monitoring on NC; continue pulm regimen  CAD, PVD - continue lipitor, metoprolol; holding antiplatelets, a/c. Cardiology consult appreciated.   plan: 3/15: check EKG for chest pain and dyspnea  dvt prophy

## 2023-03-16 NOTE — PROGRESS NOTE ADULT - SUBJECTIVE AND OBJECTIVE BOX
[facility] -- Saud Aguilar, Medina Capps Patel, Savella, Goodger  Office # [phone]    Follow Up:  CAD s/p CABG, HTN, Cardiac Optimization    Subjective/Observations: Seen and evaluated.  Tolerating RA, non-orthopneic.  Denies CP or palpitations.  No evidence overt bleeding    REVIEW OF SYSTEMS: All other review of systems is negative unless indicated above  PAST MEDICAL & SURGICAL HISTORY:  Diabetes Mellitus, Type II  CAD (Coronary Artery Disease)  s/p 3v CABG 2004; stents placed in [place] in 2019  Dyslipidemia  Osteomyelitis  COPD (chronic obstructive pulmonary disease)  on 2L at home and BiPAP at night; intubated 6/18  Hypertension  PVD (peripheral vascular disease)  History of PAT (paroxysmal atrial tachycardia)  Asthma with COPD  BPH (benign prostatic hyperplasia)  Acute osteomyelitis  CABG (Coronary Artery Bypass Graft)  2004  Compound fracture  left leg  S/P primary angioplasty with coronary stent  H/O drainage of abscess  Left femur 12/2021    MEDICATIONS  (STANDING):  ascorbic acid 500 milliGRAM(s) Oral daily  atorvastatin 20 milliGRAM(s) Oral at bedtime  budesonide 160 MICROgram(s)/formoterol 4.5 MICROgram(s) Inhaler 2 Puff(s) Inhalation two times a day  cholecalciferol 400 Unit(s) Oral daily  cyanocobalamin 1000 MICROGram(s) Oral daily  dextrose 5%. 1000 milliLiter(s) (50 mL/Hr) IV Continuous <Continuous>  dextrose 5%. 1000 milliLiter(s) (100 mL/Hr) IV Continuous <Continuous>  dextrose 50% Injectable 25 Gram(s) IV Push once  dextrose 50% Injectable 12.5 Gram(s) IV Push once  dextrose 50% Injectable 25 Gram(s) IV Push once  furosemide    Tablet 20 milliGRAM(s) Oral daily  glucagon  Injectable 1 milliGRAM(s) IntraMuscular once  insulin glargine Injectable (LANTUS) 15 Unit(s) SubCutaneous at bedtime  insulin lispro (ADMELOG) corrective regimen sliding scale   SubCutaneous at bedtime  insulin lispro (ADMELOG) corrective regimen sliding scale   SubCutaneous three times a day before meals  mepolizumab Subcutaneous Injectable 100 milliGRAM(s) SubCutaneous every 4 weeks  metoprolol tartrate 50 milliGRAM(s) Oral two times a day  montelukast 10 milliGRAM(s) Oral daily  multivitamin 1 Tablet(s) Oral daily  Nephro-carlos 1 Tablet(s) Oral daily  pantoprazole  Injectable 40 milliGRAM(s) IV Push every 12 hours  saccharomyces boulardii 250 milliGRAM(s) Oral two times a day  simethicone 80 milliGRAM(s) Chew every 6 hours  sucralfate 1 Gram(s) Oral four times a day  tamsulosin 0.4 milliGRAM(s) Oral at bedtime  tiotropium 2.5 MICROgram(s) Inhaler 2 Puff(s) Inhalation daily    MEDICATIONS  (PRN):  albuterol/ipratropium for Nebulization 3 milliLiter(s) Nebulizer every 6 hours PRN Shortness of Breath and/or Wheezing  dextrose Oral Gel 15 Gram(s) Oral once PRN Blood Glucose LESS THAN 70 milliGRAM(s)/deciliter    Allergies    [This allergen will not trigger allergy alert] IV DYE, IODINE CONTRAST (Unknown)  [This allergen will not trigger allergy alert] NKDA (Unknown)  latex (Unknown)    Intolerances    shellfish (Nausea)    Vital Signs Last 24 Hrs  T(C): 36.5 (16 Mar 2023 05:42), Max: 36.9 (15 Mar 2023 12:11)  T(F): 97.7 (16 Mar 2023 05:42), Max: 98.4 (15 Mar 2023 12:11)  HR: 96 (16 Mar 2023 05:42) (88 - 98)  BP: 123/70 (16 Mar 2023 05:42) (96/60 - 123/70)  BP(mean): --  RR: 16 (16 Mar 2023 05:42) (16 - 18)  SpO2: 98% (16 Mar 2023 07:46) (92% - 98%)    Parameters below as of 16 Mar 2023 07:46  Patient On (Oxygen Delivery Method): room air    I&O's Summary    15 Mar 2023 07:01  -  16 Mar 2023 07:00  --------------------------------------------------------  IN: 302 mL / OUT: 720 mL / NET: -418 mL     PHYSICAL EXAM:  TELE: NSR  Constitutional: NAD, awake and alert, well-developed  HEENT: Moist Mucous Membranes, Anicteric  Pulmonary: Non-labored, breath sounds are clear but diminished bilaterally, No wheezing, rales or rhonchi  Cardiovascular: Regular, S1 and S2, No murmurs, rubs, gallops or clicks  Gastrointestinal: Bowel Sounds present, soft, nontender.   Lymph: 1+ BLE edema. No lymphadenopathy.  Skin: No visible rashes or ulcers.  Psych:  Mood & affect: Flat  LABS: All Labs Reviewed:                        7.4    8.30  )-----------( 250      ( 16 Mar 2023 05:25 )             24.8                         7.6    x     )-----------( x        ( 15 Mar 2023 12:20 )             24.7                         7.2    8.81  )-----------( 247      ( 15 Mar 2023 08:10 )             23.9     16 Mar 2023 05:25    143    |  110    |  14     ----------------------------<  131    3.6     |  31     |  0.94   15 Mar 2023 08:10    142    |  110    |  16     ----------------------------<  158    3.7     |  29     |  1.10   14 Mar 2023 06:21    142    |  109    |  21     ----------------------------<  174    4.0     |  29     |  1.10     Ca    8.8        16 Mar 2023 05:25  Ca    9.1        15 Mar 2023 08:10  Ca    8.9        14 Mar 2023 06:21  Phos  2.4       16 Mar 2023 05:25  Phos  2.0       14 Mar 2023 06:21  Mg     1.5       16 Mar 2023 05:25  Mg     1.6       14 Mar 2023 06:21    EXAM:  ECHO TTE WO CON COMP W DOPP    PROCEDURE DATE:  12/20/2021      INTERPRETATION:  INDICATION: Ischemic heart disease  sonographer KL    Blood Pressure 123/70    Height 180.3 cm     Weight 97.5 kg       BSA 2.2   sq m    Dimensions:  LA 3.0       Normal Values: 2.0 - 4.0 cm  Ao 3.5        Normal Values: 2.0 - 3.8 cm  SEPTUM 1.3       Normal Values: 0.6 - 1.2 cm  PWT 1.0       Normal Values: 0.6 - 1.1 cm  LVIDd 4.3         Normal Values: 3.0 - 5.6 cm  LVIDs 1.8         Normal Values: 1.8 - 4.0 cm    OBSERVATIONS:  Technically difficult and limited study  Mitral Valve: normal, trace physiologic MR.  Aortic Valve/Aorta: Sclerotic trileaflet aortic valve with normal opening.  Tricuspid Valve: Not well-visualized  Pulmonic Valve: Not well-visualized  Left Atrium: normal  Right Atrium: Not well-visualized  Left Ventricle: The left ventricular endocardium is not well-visualized.   Overall normal systolic function with an estimated LVEF of 55%. Cannot   evaluate for segmental abnormalities  Right Ventricle: Not well-visualized  Pericardium: no significant pericardial effusion.  Pulmonary/RV Pressure: estimated PA systolic pressure of 28 mmHg  LV diastolic dysfunction is present    IMPRESSION:  Technically difficult and limited study  The left ventricular endocardium is not well-visualized. Overall normal   systolic function with an estimated LVEF of 55-60%. Cannot evaluate for   segmental abnormalities  The right ventricle is not well-visualized  Sclerotic trileaflet aortic valve, without AI.  Trace physiologic MR  No significant pericardial effusion.    --- End of Report ---      ARISTIDES LEE MD; Attending Cardiologist  This document has been electronically signed. Dec 21 2021  1:38PM    Ventricular Rate 96 BPM    Atrial Rate 96 BPM    P-R Interval 152 ms    QRS Duration 98 ms    Q-T Interval 352 ms    QTC Calculation(Bazett) 444 ms    P Axis 72 degrees    R Axis 73 degrees    T Axis 61 degrees    Diagnosis Line Normal sinus rhythm  Normal ECG  When compared with ECG of 06-MAR-2023 19:32,  No significant change was found  Confirmed by Jonas Capps MD ([id number]) on 3/15/2023 3:08:51 PM         [facility] -- Saud Aguilar, Génesis, Carroll Haney Savella, Goodger  Office # [phone]    Follow Up:  CAD s/p CABG, HTN, Cardiac Optimization    Subjective/Observations: Seen and evaluated.  Tolerating RA, non-orthopneic.  Denies CP or palpitations.  c/o bloody stools.  No dizziness or lightheadedness    REVIEW OF SYSTEMS: All other review of systems is negative unless indicated above  PAST MEDICAL & SURGICAL HISTORY:  Diabetes Mellitus, Type II  CAD (Coronary Artery Disease)  s/p 3v CABG 2004; stents placed in [place] in 2019  Dyslipidemia  Osteomyelitis  COPD (chronic obstructive pulmonary disease)  on 2L at home and BiPAP at night; intubated 6/18  Hypertension  PVD (peripheral vascular disease)  History of PAT (paroxysmal atrial tachycardia)  Asthma with COPD  BPH (benign prostatic hyperplasia)  Acute osteomyelitis  CABG (Coronary Artery Bypass Graft)  2004  Compound fracture  left leg  S/P primary angioplasty with coronary stent  H/O drainage of abscess  Left femur 12/2021    MEDICATIONS  (STANDING):  ascorbic acid 500 milliGRAM(s) Oral daily  atorvastatin 20 milliGRAM(s) Oral at bedtime  budesonide 160 MICROgram(s)/formoterol 4.5 MICROgram(s) Inhaler 2 Puff(s) Inhalation two times a day  cholecalciferol 400 Unit(s) Oral daily  cyanocobalamin 1000 MICROGram(s) Oral daily  dextrose 5%. 1000 milliLiter(s) (50 mL/Hr) IV Continuous <Continuous>  dextrose 5%. 1000 milliLiter(s) (100 mL/Hr) IV Continuous <Continuous>  dextrose 50% Injectable 25 Gram(s) IV Push once  dextrose 50% Injectable 12.5 Gram(s) IV Push once  dextrose 50% Injectable 25 Gram(s) IV Push once  furosemide    Tablet 20 milliGRAM(s) Oral daily  glucagon  Injectable 1 milliGRAM(s) IntraMuscular once  insulin glargine Injectable (LANTUS) 15 Unit(s) SubCutaneous at bedtime  insulin lispro (ADMELOG) corrective regimen sliding scale   SubCutaneous at bedtime  insulin lispro (ADMELOG) corrective regimen sliding scale   SubCutaneous three times a day before meals  mepolizumab Subcutaneous Injectable 100 milliGRAM(s) SubCutaneous every 4 weeks  metoprolol tartrate 50 milliGRAM(s) Oral two times a day  montelukast 10 milliGRAM(s) Oral daily  multivitamin 1 Tablet(s) Oral daily  Nephro-carlos 1 Tablet(s) Oral daily  pantoprazole  Injectable 40 milliGRAM(s) IV Push every 12 hours  saccharomyces boulardii 250 milliGRAM(s) Oral two times a day  simethicone 80 milliGRAM(s) Chew every 6 hours  sucralfate 1 Gram(s) Oral four times a day  tamsulosin 0.4 milliGRAM(s) Oral at bedtime  tiotropium 2.5 MICROgram(s) Inhaler 2 Puff(s) Inhalation daily    MEDICATIONS  (PRN):  albuterol/ipratropium for Nebulization 3 milliLiter(s) Nebulizer every 6 hours PRN Shortness of Breath and/or Wheezing  dextrose Oral Gel 15 Gram(s) Oral once PRN Blood Glucose LESS THAN 70 milliGRAM(s)/deciliter    Allergies    [This allergen will not trigger allergy alert] IV DYE, IODINE CONTRAST (Unknown)  [This allergen will not trigger allergy alert] NKDA (Unknown)  latex (Unknown)    Intolerances    shellfish (Nausea)    Vital Signs Last 24 Hrs  T(C): 36.5 (16 Mar 2023 05:42), Max: 36.9 (15 Mar 2023 12:11)  T(F): 97.7 (16 Mar 2023 05:42), Max: 98.4 (15 Mar 2023 12:11)  HR: 96 (16 Mar 2023 05:42) (88 - 98)  BP: 123/70 (16 Mar 2023 05:42) (96/60 - 123/70)  BP(mean): --  RR: 16 (16 Mar 2023 05:42) (16 - 18)  SpO2: 98% (16 Mar 2023 07:46) (92% - 98%)    Parameters below as of 16 Mar 2023 07:46  Patient On (Oxygen Delivery Method): room air    I&O's Summary    15 Mar 2023 07:01  -  16 Mar 2023 07:00  --------------------------------------------------------  IN: 302 mL / OUT: 720 mL / NET: -418 mL     PHYSICAL EXAM:  TELE: NSR  Constitutional: NAD, awake and alert, well-developed  HEENT: Moist Mucous Membranes, Anicteric  Pulmonary: Non-labored, breath sounds are clear but diminished bilaterally, No wheezing, rales or rhonchi  Cardiovascular: Regular, S1 and S2, No murmurs, rubs, gallops or clicks  Gastrointestinal: Bowel Sounds present, soft, nontender.   Lymph: 1+ BLE edema. No lymphadenopathy.  Skin: No visible rashes or ulcers.  Psych:  Mood & affect: Flat  LABS: All Labs Reviewed:                        7.4    8.30  )-----------( 250      ( 16 Mar 2023 05:25 )             24.8                         7.6    x     )-----------( x        ( 15 Mar 2023 12:20 )             24.7                         7.2    8.81  )-----------( 247      ( 15 Mar 2023 08:10 )             23.9     16 Mar 2023 05:25    143    |  110    |  14     ----------------------------<  131    3.6     |  31     |  0.94   15 Mar 2023 08:10    142    |  110    |  16     ----------------------------<  158    3.7     |  29     |  1.10   14 Mar 2023 06:21    142    |  109    |  21     ----------------------------<  174    4.0     |  29     |  1.10     Ca    8.8        16 Mar 2023 05:25  Ca    9.1        15 Mar 2023 08:10  Ca    8.9        14 Mar 2023 06:21  Phos  2.4       16 Mar 2023 05:25  Phos  2.0       14 Mar 2023 06:21  Mg     1.5       16 Mar 2023 05:25  Mg     1.6       14 Mar 2023 06:21    EXAM:  ECHO TTE WO CON COMP W DOPP    PROCEDURE DATE:  12/20/2021      INTERPRETATION:  INDICATION: Ischemic heart disease  sonographer KL    Blood Pressure 123/70    Height 180.3 cm     Weight 97.5 kg       BSA 2.2   sq m    Dimensions:  LA 3.0       Normal Values: 2.0 - 4.0 cm  Ao 3.5        Normal Values: 2.0 - 3.8 cm  SEPTUM 1.3       Normal Values: 0.6 - 1.2 cm  PWT 1.0       Normal Values: 0.6 - 1.1 cm  LVIDd 4.3         Normal Values: 3.0 - 5.6 cm  LVIDs 1.8         Normal Values: 1.8 - 4.0 cm    OBSERVATIONS:  Technically difficult and limited study  Mitral Valve: normal, trace physiologic MR.  Aortic Valve/Aorta: Sclerotic trileaflet aortic valve with normal opening.  Tricuspid Valve: Not well-visualized  Pulmonic Valve: Not well-visualized  Left Atrium: normal  Right Atrium: Not well-visualized  Left Ventricle: The left ventricular endocardium is not well-visualized.   Overall normal systolic function with an estimated LVEF of 55%. Cannot   evaluate for segmental abnormalities  Right Ventricle: Not well-visualized  Pericardium: no significant pericardial effusion.  Pulmonary/RV Pressure: estimated PA systolic pressure of 28 mmHg  LV diastolic dysfunction is present    IMPRESSION:  Technically difficult and limited study  The left ventricular endocardium is not well-visualized. Overall normal   systolic function with an estimated LVEF of 55-60%. Cannot evaluate for   segmental abnormalities  The right ventricle is not well-visualized  Sclerotic trileaflet aortic valve, without AI.  Trace physiologic MR  No significant pericardial effusion.    --- End of Report ---      ARISTIDES LEE MD; Attending Cardiologist  This document has been electronically signed. Dec 21 2021  1:38PM    Ventricular Rate 96 BPM    Atrial Rate 96 BPM    P-R Interval 152 ms    QRS Duration 98 ms    Q-T Interval 352 ms    QTC Calculation(Bazett) 444 ms    P Axis 72 degrees    R Axis 73 degrees    T Axis 61 degrees    Diagnosis Line Normal sinus rhythm  Normal ECG  When compared with ECG of 06-MAR-2023 19:32,  No significant change was found  Confirmed by Jonas Capps MD ([id number]) on 3/15/2023 3:08:51 PM

## 2023-03-16 NOTE — PROGRESS NOTE ADULT - SUBJECTIVE AND OBJECTIVE BOX
Date/Time Patient Seen:  		  Referring MD:   Data Reviewed	       Patient is a 82y old  Male who presents with a chief complaint of Left  thigh draining sinus (15 Mar 2023 11:50)      Subjective/HPI     PAST MEDICAL & SURGICAL HISTORY:  Diabetes Mellitus, Type II    CAD (Coronary Artery Disease)  s/p 3v CABG 2004; stents placed in [place] in 2019    Dyslipidemia    Asymptomatic Peripheral Vascular Disease    Osteomyelitis    COPD (chronic obstructive pulmonary disease)  on 2L at home and BiPAP at night; intubated 6/18    Diabetes mellitus    Hypertension    PVD (peripheral vascular disease)    History of PAT (paroxysmal atrial tachycardia)    Asthma with COPD    BPH (benign prostatic hyperplasia)    Acute osteomyelitis    CABG (Coronary Artery Bypass Graft)  2004    Compound fracture  left leg    S/P primary angioplasty with coronary stent    H/O drainage of abscess  Left femur 12/2021          Medication list         MEDICATIONS  (STANDING):  ascorbic acid 500 milliGRAM(s) Oral daily  atorvastatin 20 milliGRAM(s) Oral at bedtime  budesonide 160 MICROgram(s)/formoterol 4.5 MICROgram(s) Inhaler 2 Puff(s) Inhalation two times a day  cholecalciferol 400 Unit(s) Oral daily  cyanocobalamin 1000 MICROGram(s) Oral daily  dextrose 5%. 1000 milliLiter(s) (100 mL/Hr) IV Continuous <Continuous>  dextrose 5%. 1000 milliLiter(s) (50 mL/Hr) IV Continuous <Continuous>  dextrose 50% Injectable 25 Gram(s) IV Push once  dextrose 50% Injectable 12.5 Gram(s) IV Push once  dextrose 50% Injectable 25 Gram(s) IV Push once  furosemide    Tablet 20 milliGRAM(s) Oral daily  glucagon  Injectable 1 milliGRAM(s) IntraMuscular once  insulin glargine Injectable (LANTUS) 15 Unit(s) SubCutaneous at bedtime  insulin lispro (ADMELOG) corrective regimen sliding scale   SubCutaneous three times a day before meals  insulin lispro (ADMELOG) corrective regimen sliding scale   SubCutaneous at bedtime  mepolizumab Subcutaneous Injectable 100 milliGRAM(s) SubCutaneous every 4 weeks  metoprolol tartrate 50 milliGRAM(s) Oral two times a day  montelukast 10 milliGRAM(s) Oral daily  multivitamin 1 Tablet(s) Oral daily  Nephro-carlos 1 Tablet(s) Oral daily  pantoprazole  Injectable 40 milliGRAM(s) IV Push every 12 hours  saccharomyces boulardii 250 milliGRAM(s) Oral two times a day  simethicone 80 milliGRAM(s) Chew every 6 hours  sucralfate 1 Gram(s) Oral four times a day  tamsulosin 0.4 milliGRAM(s) Oral at bedtime  tiotropium 2.5 MICROgram(s) Inhaler 2 Puff(s) Inhalation daily    MEDICATIONS  (PRN):  albuterol/ipratropium for Nebulization 3 milliLiter(s) Nebulizer every 6 hours PRN Shortness of Breath and/or Wheezing  dextrose Oral Gel 15 Gram(s) Oral once PRN Blood Glucose LESS THAN 70 milliGRAM(s)/deciliter         Vitals log        ICU Vital Signs Last 24 Hrs  T(C): 36.3 (15 Mar 2023 20:51), Max: 36.9 (15 Mar 2023 09:19)  T(F): 97.3 (15 Mar 2023 20:51), Max: 98.5 (15 Mar 2023 09:19)  HR: 93 (16 Mar 2023 00:25) (88 - 98)  BP: 114/58 (16 Mar 2023 00:25) (96/60 - 114/58)  BP(mean): --  ABP: --  ABP(mean): --  RR: 18 (15 Mar 2023 20:51) (16 - 18)  SpO2: 94% (15 Mar 2023 20:51) (92% - 98%)    O2 Parameters below as of 15 Mar 2023 20:51  Patient On (Oxygen Delivery Method): room air                 Input and Output:  I&O's Detail    15 Mar 2023 07:01  -  16 Mar 2023 05:38  --------------------------------------------------------  IN:    PRBCs (Packed Red Blood Cells): 302 mL  Total IN: 302 mL    OUT:    Voided (mL): 720 mL  Total OUT: 720 mL    Total NET: -418 mL          Lab Data                        7.6    x     )-----------( x        ( 15 Mar 2023 12:20 )             24.7     03-15    142  |  110<H>  |  16  ----------------------------<  158<H>  3.7   |  29  |  1.10    Ca    9.1      15 Mar 2023 08:10  Phos  2.0     03-14  Mg     1.6     03-14              Review of Systems	      Objective     Physical Examination    heart s1s2  lung dec bS  head nc      Pertinent Lab findings & Imaging      Eliecer:  NO   Adequate UO     I&O's Detail    15 Mar 2023 07:01  -  16 Mar 2023 05:38  --------------------------------------------------------  IN:    PRBCs (Packed Red Blood Cells): 302 mL  Total IN: 302 mL    OUT:    Voided (mL): 720 mL  Total OUT: 720 mL    Total NET: -418 mL               Discussed with:     Cultures:	        Radiology

## 2023-03-16 NOTE — PROGRESS NOTE ADULT - ASSESSMENT
82 yr male with history of Hypertension, COPD home oxygen dependent, CAD CABG , chronic left femor osteomyelitis since traumatic left femoral fracture in 1966 presents with new yellowish  drainage from left thigh. Cardiology consulted for clearance for GI scopy.     Cardiac Optimization HTN, CAD s/p CABG  - EKG showed SR @ 93.  No evidence of any active ischemia.  No anginal complaints  - Holding aspirin 2/2 GI bleed  - Continue BB and statin   - Please resume ASA when cleared by GI    - Previous TTE 12/21 showed overall normal systolic function with an estimated LVEF of 55-60%.   - Pt sees Dr Rod for cardiology.   - No evidence of any meaningful volume overload laying flat.  No O2 requirement.  Continue home Lasix 20 mg PO daily    - s/p EGD gastritis, gastric ulcer, hiatal hernia.  Follow GI recs  - Given CAD, transfuse to keep hct > 26.  s/p 5 units PRBC's total    - SCDs for DVT prophylaxis   - Monitor and replete lytes, keep K>4, Mg>2.  - Will continue to follow    Dena Maynard DNP, NP-C, AGACNP-C  Cardiology   Spectra #[id number]      82 yr male with history of Hypertension, COPD home oxygen dependent, CAD CABG , chronic left femor osteomyelitis since traumatic left femoral fracture in 1966 presents with new yellowish  drainage from left thigh. Cardiology consulted for clearance for GI scopy.     Cardiac Optimization HTN, CAD s/p CABG  - EKG showed SR @ 93.  No evidence of any active ischemia.  No anginal complaints  - Continue BB and statin   - Please resume ASA when cleared by GI    - Previous TTE 12/21 showed overall normal systolic function with an estimated LVEF of 55-60%.   - Pt sees Dr Rod for cardiology.   - No evidence of any meaningful volume overload laying flat.  No O2 requirement.  Continue home Lasix 20 mg PO daily    - s/p EGD gastritis, gastric ulcer, hiatal hernia.  Follow GI recs  - Given CAD, transfuse to keep hct > 26.  s/p 5 units PRBC's total  - Still with evidence of bleeding; +melena  - Planned fro colonoscopy in am, 3/17.  Patient is considered optimized to undergo a low risk, non-cardiac procedure from cardiac standpoint.  Routine monitoring recommended    - SCDs for DVT prophylaxis   - Monitor and replete lytes, keep K>4, Mg>2.  - Will continue to follow    Dena Maynard DNP, NP-C, AGACNP-C  Cardiology   Spectra #[id number]

## 2023-03-16 NOTE — PROGRESS NOTE ADULT - SUBJECTIVE AND OBJECTIVE BOX
[facility]  INFECTIOUS DISEASES   [address] [place] [address]  Tel: [phone]     Fax: [phone]  ========================================================  MD Noman Perry Kaushal, MD Cho, Michelle, MD Sunjit, Jaspal, MD  ========================================================    [id number]  [facility]     Follow up: left thigh abscess and OM    No fever, some discharge from left thigh.  Had blood in stool and drop in H/H so had transfusion last night again, and EGD on 3/13 showed gastritis and peptic ulcer.  Now plan for colonoscopy tomorrow.     PAST MEDICAL & SURGICAL HISTORY:  Diabetes Mellitus, Type II  CAD (Coronary Artery Disease)  s/p 3v CABG ; stents placed in [place] in   Dyslipidemia  Osteomyelitis  COPD (chronic obstructive pulmonary disease)  on 2L at home and BiPAP at night; intubated   Hypertension  PVD (peripheral vascular disease)  History of PAT (paroxysmal atrial tachycardia)  Asthma with COPD  BPH (benign prostatic hyperplasia)  Acute osteomyelitis  CABG (Coronary Artery Bypass Graft)    Compound fracture  left leg  S/P primary angioplasty with coronary stent  H/O drainage of abscess  Left femur 2021    Social Hx: No current smoking, ETOH or drugs     FAMILY HISTORY:  Family history of diabetes mellitus (Sibling)    Family hx of lung cancer  brother,  age 82, used to smoke with pt    Allergies  No Known Allergies    Intolerances  shellfish (Nausea)    Antibiotics:  Ciprofloxacin      REVIEW OF SYSTEMS:  CONSTITUTIONAL:  No Fever or chills  HEENT:  No diplopia or blurred vision.  No sore throat or runny nose.  CARDIOVASCULAR:  No chest pain or SOB.  RESPIRATORY:  No cough, shortness of breath, PND or orthopnea.  GASTROINTESTINAL:  No nausea, vomiting or diarrhea.  GENITOURINARY:  No dysuria, frequency or urgency. No Blood in urine  MUSCULOSKELETAL:  left thigh pain and drainage   SKIN:  No change in skin, hair or nails.  NEUROLOGIC:  No paresthesias or weakness.  PSYCHIATRIC:  No disorder of thought or mood.  ENDOCRINE:  No heat or cold intolerance, polyuria or polydipsia.  HEMATOLOGICAL:  No easy bruising or bleeding.     Physical Exam:  Vital Signs Last 24 Hrs  T(C): 36.5 (16 Mar 2023 05:42), Max: 36.9 (15 Mar 2023 12:11)  T(F): 97.7 (16 Mar 2023 05:42), Max: 98.4 (15 Mar 2023 12:11)  HR: 96 (16 Mar 2023 05:42) (88 - 98)  BP: 123/70 (16 Mar 2023 05:42) (96/60 - 123/70)  BP(mean): --  RR: 16 (16 Mar 2023 05:42) (16 - 18)  SpO2: 98% (16 Mar 2023 07:46) (92% - 98%)  Parameters below as of 16 Mar 2023 07:46  Patient On (Oxygen Delivery Method): room air  GEN: NAD  HEENT: normocephalic and atraumatic. EOMI. PERRL.    NECK: Supple.  No lymphadenopathy   LUNGS: poor air movement   HEART: Regular rate and rhythm   ABDOMEN: Soft, nontender, and nondistended.  Positive bowel sounds.    : No CVA tenderness  EXTREMITIES: left thigh with scar in lateral side with fluctuation and small opening with yellow discharge   NEUROLOGIC: grossly intact.  PSYCHIATRIC: Appropriate affect .  SKIN: No rash     Labs:                        7.4    8.30  )-----------( 250      ( 16 Mar 2023 05:25 )             24.8         143  |  110<H>  |  14  ----------------------------<  131<H>  3.6   |  31  |  0.94    Ca    8.8      16 Mar 2023 05:25  Phos  2.4       Mg     1.5         Culture - Abscess with Gram Stain (collected 03-10-23 @ 11:50)  Source: .Abscess left leg  Final Report (03-15-23 @ 19:04):    Few Corynebacterium jeikeium    "Susceptibilities not performed"    Culture - Abscess with Gram Stain (collected 23 @ 13:50)  Source: .Abscess left thigh  Final Report (23 @ 14:49):    Moderate Coag Negative Staphylococcus "Susceptibilities not performed"    Multiple Morphological Strains    Culture - Urine (collected 23 @ 20:15)  Source: Clean Catch Clean Catch (Midstream)  Final Report (23 @ 23:02):    <10,000 CFU/mL Normal Urogenital Shayy    Culture - Blood (collected 23 @ 15:53)  Source: .Blood Blood-Peripheral  Final Report (23 @ 23:01):    No Growth Final    Culture - Blood (collected 23 @ 15:43)  Source: .Blood Blood-Peripheral  Final Report (23 @ 23:01):    No Growth Final    WBC Count: 8.30 K/uL (23 @ 05:25)  WBC Count: 8.81 K/uL (03-15-23 @ 08:10)  WBC Count: 10.11 K/uL (23 @ 06:21)  WBC Count: 7.99 K/uL (23 @ 07:30)  WBC Count: 8.49 K/uL (23 @ 07:43)    Creatinine, Serum: 0.94 mg/dL (23 @ 05:25)  Creatinine, Serum: 1.10 mg/dL (03-15-23 @ 08:10)  Creatinine, Serum: 1.10 mg/dL (23 @ 06:21)  Creatinine, Serum: 1.00 mg/dL (23 @ 07:30)  Creatinine, Serum: 1.10 mg/dL (23 @ 07:43)    C-Reactive Protein, Serum: 45 mg/L (23 @ 15:53)    Sedimentation Rate, Erythrocyte: 39 mm/hr (23 @ 15:53)     COVID-19 PCR: NotDetec (23 @ 18:44)  SARS-CoV-2 Result: NotDetec (23 @ 15:53)    All imaging and other data have been reviewed.  < from: MR Femur No Cont, Left (22 @ 13:40) >  IMPRESSION:  Chronic osteomyelitis with deformity of bone and with scattered areas of   T2 hyperintensity, less prominent than on the previous MRI of 2021,   however cannot exclude superimposed acute osteomyelitis/intraosseous   abscess.  Fluid tract extending from the chronic bony defect is contiguous with a   soft tissue abscess centered deep to the vastus intermedius measuring  12.9 cm craniocaudally, with surrounding surrounding muscle and soft   tissue edema.    Assessment and Plan:   83yo man with PMH of HTN, COPD on home O2, CAD s/p CABG, PVD s/p stents in b/l legs and left femoral fracture more than 50 years ago, was admitted at [facility] with left  leg pain on the site of old fracture after CT showed possible intramedullary abscess. He had pain and infection in the old fracture area at least 3-4 times in the past, had multiple surgeries   and drainage of area with a long course of antibiotic treatment.  MRI showed collection, intraosseous abscess  S/P IR drain placement on 21  IR culture NGTD but had another culture with enterobacter.   Completed 6 weeks of ceftriaxone 2gm with PICC line in 2022  He was seen in the clinic and was started on suppressive ciprofloxacin for good oral bioavailability and also based on the culture.  MRI 2022 done showed 12.9cm collection with OM.   He stopped cipro sometimes last year and now feels more pain and swelling in area and started draining again.   Now MRI with 22cm collection.     Had drop in H/H and rectal bleeding for which had EGD 3/13 with Gastritis and peptic ulcer. Now plan for colonoscopy tomorrow, still has low H/H after transfusion.   no fever or leukocytosis, so cefepime was stopped on 3/14 pending drain placement and culture results.     Recommendations:  - Blood cultures negative   - MRI result noted, IR has been consulted for possible drain placement but for now on hold for GI bleed   - Wound discharge sent for culture twice both times with skin shayy, CoNS and corynebacterium   - GI work up noted s/p EGD on 3/13 with gastritis and peptic ulcer, now for colonoscopy      Will follow.  Discussed with his wife at the bedside.     Brandi العلي MD  Division of Infectious Diseases   Please call ID service at [phone] with any question.      35 minutes spent on total encounter assessing patient, examination, chart review, counseling and coordinating care by the attending physician/nurse/care manager.

## 2023-03-16 NOTE — PROGRESS NOTE ADULT - SUBJECTIVE AND OBJECTIVE BOX
Patient seen and examined  no overnight events    today; patient reports extertional dyspnea, chest pain  noted BRBPR and maroon stools today  vitals stable  labs: hb 7.4 after receiving anotherunt of blood  patient on IV cefepime and PO lasix  Cards/ Pulm/GI consult appreciated    Review of Systems:  General:denies fever chills, headache, weakness  HEENT: denies blurry vision,diffculty swallowing, difficulty hearing, tinnitus  Cardiovascular: denies chest pain  ,palpitations  Pulmonary:+ shortness of breath, cough, wheezing, hemoptysis  Gastrointestinal: denies abdominal pain, constipation, diarrhea,nausea , vomiting, +hematochezia  : denies hematuria, dysuria, or incontinence  Neurological: denies weakness, numbness , tingling, dizziness, tremors  MSK: denies muscle pain, difficulty ambulating, swelling, back pain  skin: denies skin rash, itching, burning, or  skin lesions  Psychiatrical: denies mood disturbances, anxierty, feeling depressed, depression , or difficulty sleeping    Objective:  Vitals  ICU Vital Signs Last 24 Hrs  T(C): 36.5 (16 Mar 2023 05:42), Max: 36.9 (15 Mar 2023 12:11)  T(F): 97.7 (16 Mar 2023 05:42), Max: 98.4 (15 Mar 2023 12:11)  HR: 96 (16 Mar 2023 05:42) (88 - 98)  BP: 123/70 (16 Mar 2023 05:42) (96/60 - 123/70)  BP(mean): --  ABP: --  ABP(mean): --  RR: 16 (16 Mar 2023 05:42) (16 - 18)  SpO2: 98% (16 Mar 2023 07:46) (92% - 98%)    O2 Parameters below as of 16 Mar 2023 07:46  Patient On (Oxygen Delivery Method): room air          Physical Exam:  General: comfortable, no acute distress  HEENT: Atraumatic, no LAD, trachea midline, PERRLA  Cardiovascular: normal s1s2, no murmurs, gallops or fricition rubs  Pulmonary: clear to ausculation Bilaterally, no wheezing , rhonchi  Gastrointestinal: soft non tender non distended, no masses felt, no organomegally  Muscloskeletal: no lower extremity edema, intact bilateral lower extremity pulses  Neurological: CN II-12 intact. No focal weakness  Psychiatrical: normal mood, cooperative  SKIN: no rash, lesions or ulcers    Labs:                          7.2    8.81  )-----------( 247      ( 15 Mar 2023 08:10 )             23.9     03-15    142  |  110<H>  |  16  ----------------------------<  158<H>  3.7   |  29  |  1.10    Ca    9.1      15 Mar 2023 08:10  Phos  2.0     03-14  Mg     1.6     03-14              Active Medications  MEDICATIONS  (STANDING):  ascorbic acid 500 milliGRAM(s) Oral daily  atorvastatin 20 milliGRAM(s) Oral at bedtime  budesonide 160 MICROgram(s)/formoterol 4.5 MICROgram(s) Inhaler 2 Puff(s) Inhalation two times a day  cefepime   IVPB      cefepime   IVPB 2000 milliGRAM(s) IV Intermittent every 12 hours  cholecalciferol 400 Unit(s) Oral daily  cyanocobalamin 1000 MICROGram(s) Oral daily  dextrose 5%. 1000 milliLiter(s) (100 mL/Hr) IV Continuous <Continuous>  dextrose 5%. 1000 milliLiter(s) (50 mL/Hr) IV Continuous <Continuous>  dextrose 50% Injectable 25 Gram(s) IV Push once  dextrose 50% Injectable 12.5 Gram(s) IV Push once  dextrose 50% Injectable 25 Gram(s) IV Push once  furosemide    Tablet 20 milliGRAM(s) Oral daily  glucagon  Injectable 1 milliGRAM(s) IntraMuscular once  insulin glargine Injectable (LANTUS) 15 Unit(s) SubCutaneous at bedtime  insulin lispro (ADMELOG) corrective regimen sliding scale   SubCutaneous at bedtime  insulin lispro (ADMELOG) corrective regimen sliding scale   SubCutaneous three times a day before meals  mepolizumab Subcutaneous Injectable 100 milliGRAM(s) SubCutaneous every 4 weeks  metoprolol tartrate 50 milliGRAM(s) Oral two times a day  montelukast 10 milliGRAM(s) Oral daily  multivitamin 1 Tablet(s) Oral daily  Nephro-carlos 1 Tablet(s) Oral daily  pantoprazole  Injectable 40 milliGRAM(s) IV Push every 12 hours  saccharomyces boulardii 250 milliGRAM(s) Oral two times a day  simethicone 80 milliGRAM(s) Chew every 6 hours  sucralfate 1 Gram(s) Oral four times a day  tamsulosin 0.4 milliGRAM(s) Oral at bedtime  tiotropium 2.5 MICROgram(s) Inhaler 2 Puff(s) Inhalation daily    MEDICATIONS  (PRN):  albuterol/ipratropium for Nebulization 3 milliLiter(s) Nebulizer every 6 hours PRN Shortness of Breath and/or Wheezing  dextrose Oral Gel 15 Gram(s) Oral once PRN Blood Glucose LESS THAN 70 milliGRAM(s)/deciliter

## 2023-03-16 NOTE — PROGRESS NOTE ADULT - ASSESSMENT
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     OM  COURTNEY  Asthma  COPD  HTN  CAD  CABG hx    po lasix  vs noted  labs reviewed  on NIV night time for COPD    NIV use night time and prn - 18/8 and 25 pct fio2  sleep hygiene reviewed  cvs rx regimen  BP control  pt is on Nucala in the office - Monthly with Dr Oscar Marcelino - [facility]  on Symbicort - Spiriva - Singulair - copd - asthma -   NEBS PRN  monitor VS and Sat  keep sat > 88 pct  spoke with pt - wife  outpatient records reviewed  emp ABX in progress - ID follow up  skin - wound care

## 2023-03-17 LAB
ANION GAP SERPL CALC-SCNC: 4 MMOL/L — LOW (ref 5–17)
BASOPHILS # BLD AUTO: 0.02 K/UL — SIGNIFICANT CHANGE UP (ref 0–0.2)
BASOPHILS NFR BLD AUTO: 0.3 % — SIGNIFICANT CHANGE UP (ref 0–2)
BUN SERPL-MCNC: 10 MG/DL — SIGNIFICANT CHANGE UP (ref 7–23)
CALCIUM SERPL-MCNC: 9.1 MG/DL — SIGNIFICANT CHANGE UP (ref 8.5–10.1)
CHLORIDE SERPL-SCNC: 109 MMOL/L — HIGH (ref 96–108)
CO2 SERPL-SCNC: 32 MMOL/L — HIGH (ref 22–31)
CREAT SERPL-MCNC: 0.98 MG/DL — SIGNIFICANT CHANGE UP (ref 0.5–1.3)
EGFR: 77 ML/MIN/1.73M2 — SIGNIFICANT CHANGE UP
EOSINOPHIL # BLD AUTO: 0.05 K/UL — SIGNIFICANT CHANGE UP (ref 0–0.5)
EOSINOPHIL NFR BLD AUTO: 0.7 % — SIGNIFICANT CHANGE UP (ref 0–6)
GLUCOSE SERPL-MCNC: 116 MG/DL — HIGH (ref 70–99)
HCT VFR BLD CALC: 29.1 % — LOW (ref 39–50)
HGB BLD-MCNC: 8.9 G/DL — LOW (ref 13–17)
IMM GRANULOCYTES NFR BLD AUTO: 0.8 % — SIGNIFICANT CHANGE UP (ref 0–0.9)
LYMPHOCYTES # BLD AUTO: 1.53 K/UL — SIGNIFICANT CHANGE UP (ref 1–3.3)
LYMPHOCYTES # BLD AUTO: 21.1 % — SIGNIFICANT CHANGE UP (ref 13–44)
MAGNESIUM SERPL-MCNC: 1.6 MG/DL — SIGNIFICANT CHANGE UP (ref 1.6–2.6)
MCHC RBC-ENTMCNC: 29 PG — SIGNIFICANT CHANGE UP (ref 27–34)
MCHC RBC-ENTMCNC: 30.6 GM/DL — LOW (ref 32–36)
MCV RBC AUTO: 94.8 FL — SIGNIFICANT CHANGE UP (ref 80–100)
MONOCYTES # BLD AUTO: 0.82 K/UL — SIGNIFICANT CHANGE UP (ref 0–0.9)
MONOCYTES NFR BLD AUTO: 11.3 % — SIGNIFICANT CHANGE UP (ref 2–14)
NEUTROPHILS # BLD AUTO: 4.76 K/UL — SIGNIFICANT CHANGE UP (ref 1.8–7.4)
NEUTROPHILS NFR BLD AUTO: 65.8 % — SIGNIFICANT CHANGE UP (ref 43–77)
NRBC # BLD: 0 /100 WBCS — SIGNIFICANT CHANGE UP (ref 0–0)
PHOSPHATE SERPL-MCNC: 2.5 MG/DL — SIGNIFICANT CHANGE UP (ref 2.5–4.5)
PLATELET # BLD AUTO: 263 K/UL — SIGNIFICANT CHANGE UP (ref 150–400)
POTASSIUM SERPL-MCNC: 3 MMOL/L — LOW (ref 3.5–5.3)
POTASSIUM SERPL-SCNC: 3 MMOL/L — LOW (ref 3.5–5.3)
RBC # BLD: 3.07 M/UL — LOW (ref 4.2–5.8)
RBC # FLD: 16.4 % — HIGH (ref 10.3–14.5)
SODIUM SERPL-SCNC: 145 MMOL/L — SIGNIFICANT CHANGE UP (ref 135–145)
WBC # BLD: 7.24 K/UL — SIGNIFICANT CHANGE UP (ref 3.8–10.5)
WBC # FLD AUTO: 7.24 K/UL — SIGNIFICANT CHANGE UP (ref 3.8–10.5)

## 2023-03-17 PROCEDURE — 99233 SBSQ HOSP IP/OBS HIGH 50: CPT

## 2023-03-17 PROCEDURE — 99232 SBSQ HOSP IP/OBS MODERATE 35: CPT

## 2023-03-17 PROCEDURE — 71045 X-RAY EXAM CHEST 1 VIEW: CPT | Mod: 26

## 2023-03-17 PROCEDURE — 88305 TISSUE EXAM BY PATHOLOGIST: CPT | Mod: 26

## 2023-03-17 DEVICE — CLIP HEMO INSTINCT PLUS ENDOSCOPIC: Type: IMPLANTABLE DEVICE | Status: FUNCTIONAL

## 2023-03-17 DEVICE — HEMOSPRAY HEMOSTAT ENDO 7F: Type: IMPLANTABLE DEVICE | Status: FUNCTIONAL

## 2023-03-17 DEVICE — CLIP RESOLUTION 360 235CM: Type: IMPLANTABLE DEVICE | Status: FUNCTIONAL

## 2023-03-17 RX ORDER — POTASSIUM CHLORIDE 20 MEQ
10 PACKET (EA) ORAL ONCE
Refills: 0 | Status: COMPLETED | OUTPATIENT
Start: 2023-03-17 | End: 2023-03-17

## 2023-03-17 RX ORDER — INSULIN LISPRO 100/ML
VIAL (ML) SUBCUTANEOUS EVERY 6 HOURS
Refills: 0 | Status: DISCONTINUED | OUTPATIENT
Start: 2023-03-17 | End: 2023-03-17

## 2023-03-17 RX ORDER — CEFEPIME 1 G/1
2000 INJECTION, POWDER, FOR SOLUTION INTRAMUSCULAR; INTRAVENOUS EVERY 8 HOURS
Refills: 0 | Status: DISCONTINUED | OUTPATIENT
Start: 2023-03-17 | End: 2023-03-21

## 2023-03-17 RX ORDER — INSULIN LISPRO 100/ML
VIAL (ML) SUBCUTANEOUS
Refills: 0 | Status: DISCONTINUED | OUTPATIENT
Start: 2023-03-17 | End: 2023-03-20

## 2023-03-17 RX ORDER — FUROSEMIDE 40 MG
40 TABLET ORAL ONCE
Refills: 0 | Status: COMPLETED | OUTPATIENT
Start: 2023-03-17 | End: 2023-03-17

## 2023-03-17 RX ORDER — INSULIN LISPRO 100/ML
VIAL (ML) SUBCUTANEOUS AT BEDTIME
Refills: 0 | Status: DISCONTINUED | OUTPATIENT
Start: 2023-03-17 | End: 2023-03-20

## 2023-03-17 RX ADMIN — Medication 250 MILLIGRAM(S): at 06:47

## 2023-03-17 RX ADMIN — PANTOPRAZOLE SODIUM 40 MILLIGRAM(S): 20 TABLET, DELAYED RELEASE ORAL at 17:13

## 2023-03-17 RX ADMIN — Medication 250 MILLIGRAM(S): at 17:57

## 2023-03-17 RX ADMIN — SIMETHICONE 80 MILLIGRAM(S): 80 TABLET, CHEWABLE ORAL at 06:46

## 2023-03-17 RX ADMIN — Medication 400 UNIT(S): at 11:08

## 2023-03-17 RX ADMIN — MONTELUKAST 10 MILLIGRAM(S): 4 TABLET, CHEWABLE ORAL at 11:08

## 2023-03-17 RX ADMIN — Medication 1 GRAM(S): at 11:08

## 2023-03-17 RX ADMIN — TIOTROPIUM BROMIDE 2 PUFF(S): 18 CAPSULE ORAL; RESPIRATORY (INHALATION) at 06:45

## 2023-03-17 RX ADMIN — SIMETHICONE 80 MILLIGRAM(S): 80 TABLET, CHEWABLE ORAL at 11:08

## 2023-03-17 RX ADMIN — BUDESONIDE AND FORMOTEROL FUMARATE DIHYDRATE 2 PUFF(S): 160; 4.5 AEROSOL RESPIRATORY (INHALATION) at 06:46

## 2023-03-17 RX ADMIN — PANTOPRAZOLE SODIUM 40 MILLIGRAM(S): 20 TABLET, DELAYED RELEASE ORAL at 06:46

## 2023-03-17 RX ADMIN — PREGABALIN 1000 MICROGRAM(S): 225 CAPSULE ORAL at 11:08

## 2023-03-17 RX ADMIN — Medication 1 TABLET(S): at 11:07

## 2023-03-17 RX ADMIN — CEFEPIME 100 MILLIGRAM(S): 1 INJECTION, POWDER, FOR SOLUTION INTRAMUSCULAR; INTRAVENOUS at 22:28

## 2023-03-17 RX ADMIN — TAMSULOSIN HYDROCHLORIDE 0.4 MILLIGRAM(S): 0.4 CAPSULE ORAL at 22:04

## 2023-03-17 RX ADMIN — SIMETHICONE 80 MILLIGRAM(S): 80 TABLET, CHEWABLE ORAL at 17:14

## 2023-03-17 RX ADMIN — INSULIN GLARGINE 15 UNIT(S): 100 INJECTION, SOLUTION SUBCUTANEOUS at 22:04

## 2023-03-17 RX ADMIN — Medication 40 MILLIGRAM(S): at 17:39

## 2023-03-17 RX ADMIN — ATORVASTATIN CALCIUM 20 MILLIGRAM(S): 80 TABLET, FILM COATED ORAL at 22:04

## 2023-03-17 RX ADMIN — Medication 500 MILLIGRAM(S): at 11:08

## 2023-03-17 RX ADMIN — Medication 100 MILLIEQUIVALENT(S): at 17:40

## 2023-03-17 RX ADMIN — Medication 50 MILLIGRAM(S): at 06:46

## 2023-03-17 RX ADMIN — Medication 1 GRAM(S): at 06:46

## 2023-03-17 RX ADMIN — Medication 50 MILLIGRAM(S): at 17:14

## 2023-03-17 RX ADMIN — Medication 1 GRAM(S): at 17:14

## 2023-03-17 RX ADMIN — Medication 1 TABLET(S): at 11:08

## 2023-03-17 NOTE — PROGRESS NOTE ADULT - ASSESSMENT
82 yr male with history of Hypertension, COPD home oxygen dependent, CAD CABG , chronic left femor osteomyelitis since traumatic left femoral fracture in 1966 presents with new yellowish  drainage from left thigh. Cardiology consulted for clearance for GI scopy.     Cardiac Optimization HTN, CAD s/p CABG  - EKG showed SR @ 93.  No evidence of any active ischemia.  No anginal complaints  - Continue BB and statin   - Please resume ASA when cleared by GI    - Previous TTE 12/21 showed overall normal systolic function with an estimated LVEF of 55-60%.   - Pt sees Dr. Rod for cardiology.   - No evidence of any meaningful volume overload laying flat.  No O2 requirement.  Continue home Lasix 20 mg PO daily  - Given Lasix 40 mg IV x 1 today pre colonoscopy  - s/p EGD gastritis, gastric ulcer, hiatal hernia.  Follow GI recs  - Still with evidence of bleeding; +melena  - For colonoscopy today, 3/17.  He remains considered optimized to undergo a low risk, non-cardiac procedure from cardiac standpoint.  Routine monitoring recommended  - Given CAD, transfuse to keep hct > 26.  s/p 5 units PRBC's total    - SCDs for DVT prophylaxis   - Monitor and replete lytes, keep K>4, Mg>2.  - Will continue to follow    Dena Maynard DNP, NP-C, AGACNP-C  Cardiology   Spectra #[id number]

## 2023-03-17 NOTE — PROGRESS NOTE ADULT - ASSESSMENT
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     OM  COURTNEY  Asthma  COPD  HTN  CAD  CABG hx    po lasix  vs noted  labs reviewed  on NIV night time for COPD    NIV use night time and prn - 18/8 and 25 pct fio2  sleep hygiene reviewed  cvs rx regimen  BP control  pt is on Nucala in the office - Monthly with Dr Oscar Marcelino - [facility]  on Symbicort - Spiriva - Singulair - copd - asthma -   NEBS PRN  monitor VS and Sat  keep sat > 88 pct  spoke with pt - wife  outpatient records reviewed

## 2023-03-17 NOTE — PROGRESS NOTE ADULT - ASSESSMENT
82M, HTN, CABG, COPD/home O2, PVD/LE stents, remote hx L femoral fx/complicated course/multiple procedures/chronic OM - mgmt for:    Acute hypoxemic respiratory failure: likely volume overload from  transfusion: will give lasix now and repeat cxr  chronic L femur OM/L thigh abscess - on IV abxs per ID - cefepime 2g q12h - duration per ID; f/up cxs; pt for IR drainage of abscess once GIB stabilized; DISCUSSED WITH IR 3.15, persued US of leg : IR feels abscess too narrow for drain.  GIB/acute blood loss anemia - s/p multiple transfusion PRBCs - monitoring hgb currently in mid-high 7s range; s/p EGD w/gastritis, gastric  ulcer. Patient with notedd bright red blood per rectum: plan: PPI, carafate. GI followup today 3/15; Tranfuse 1unit of prbc 3/15: still low: will give another unit  COPD - stable resp status - monitoring on NC; continue pulm regimen  CAD, PVD - continue lipitor, metoprolol; holding antiplatelets, a/c. Cardiology consult appreciated.   dvt prophy     82M, HTN, CABG, COPD/home O2, PVD/LE stents, remote hx L femoral fx/complicated course/multiple procedures/chronic OM - mgmt for:    Acute on chronic hypoxemic respiratory failure: likely volume overload from  transfusion: will give lasix now and repeat cxr  chronic L femur OM/L thigh abscess - on IV abxs per ID - cefepime 2g q12h - duration per ID; f/up cxs; pt for IR drainage of abscess once GIB stabilized; DISCUSSED WITH IR 3.15, persued US of leg : IR feels abscess too narrow for drain.  GIB/acute blood loss anemia - s/p multiple transfusion PRBCs - monitoring hgb currently in mid-high 7s range; s/p EGD w/gastritis, gastric  ulcer. Patient with notedd bright red blood per rectum: plan: PPI, carafate. GI followup today 3/15; Tranfuse 1unit of prbc 3/15 and one additional 3/16: hb 8.9  COPD: c/w to optimize respiratory status  CAD, PVD - continue lipitor, metoprolol; holding antiplatelets, a/c. Cardiology consult appreciated.   dvt prophy       82M, HTN, CABG, COPD/home O2, PVD/LE stents, remote hx L femoral fx/complicated course/multiple procedures/chronic OM - mgmt for:    Acute on chronic hypoxemic respiratory failure: likely volume overload from  transfusion: will give lasix now and repeat cxr  chronic L femur OM/L thigh abscess - on IV abxs per ID - cefepime 2g q12h - duration per ID; f/up cxs; pt for IR drainage of abscess once GIB stabilized; DISCUSSED WITH IR 3.15, persued US of leg : IR recommends against drain of abscess at this time  GIB/acute blood loss anemia - s/p multiple transfusion PRBCs - monitoring hgb currently in mid-high 7s range; s/p EGD w/gastritis, gastric  ulcer. Patient with notedd bright red blood per rectum: plan: PPI, carafate. GI followup today 3/15; Tranfuse 1unit of prbc 3/15 and one additional 3/16: hb 8.9  COPD: c/w to optimize respiratory status  CAD, PVD - continue lipitor, metoprolol; holding antiplatelets, a/c. Cardiology consult appreciated.   dvt prophy

## 2023-03-17 NOTE — PROGRESS NOTE ADULT - SUBJECTIVE AND OBJECTIVE BOX
Patient seen and examined  wife at bedside  s/p 1 more unit of prbc  patient now on 4 liters of 02  npo for colo today  d/w left left abscess with IR, patient  underwent US of leg. after review, IR unable to drain abscess    Review of Systems:  General:denies fever chills, headache, weakness  HEENT: denies blurry vision,diffculty swallowing, difficulty hearing, tinnitus  Cardiovascular: denies chest pain  ,palpitations  Pulmonary:denies shortness of breath, cough, wheezing, hemoptysis  Gastrointestinal: denies abdominal pain, constipation, diarrhea,nausea , vomiting, hematochezia  : denies hematuria, dysuria, or incontinence  Neurological: denies weakness, numbness , tingling, dizziness, tremors  MSK: denies muscle pain, difficulty ambulating, swelling, back pain  skin: denies skin rash, itching, burning, or  skin lesions  Psychiatrical: denies mood disturbances, anxierty, feeling depressed, depression , or difficulty sleeping    Objective:  Vitals  T(C): 36.4 (03-17-23 @ 05:43), Max: 36.7 (03-16-23 @ 21:01)  HR: 95 (03-17-23 @ 05:43) (83 - 95)  BP: 109/67 (03-17-23 @ 05:43) (101/64 - 125/67)  RR: 18 (03-17-23 @ 05:43) (16 - 18)  SpO2: 95% (03-17-23 @ 05:43) (93% - 95%)    Physical Exam:  General: comfortable, no acute distress  HEENT: Atraumatic, no LAD, trachea midline, PERRLA  Cardiovascular: normal s1s2, no murmurs, gallops or fricition rubs  Pulmonary: clear to ausculation Bilaterally, no wheezing , rhonchi  Gastrointestinal: soft non tender non distended, no masses felt, no organomegally  Muscloskeletal: no lower extremity edema, intact bilateral lower extremity pulses  Neurological: CN II-12 intact. No focal weakness  Psychiatrical: normal mood, cooperative  SKIN: no rash, lesions or ulcers :: LEFT LOWER EXTREMITY: 20cm long abscess noted    Labs:                          8.9    7.24  )-----------( 263      ( 17 Mar 2023 05:54 )             29.1     03-17    145  |  109<H>  |  10  ----------------------------<  116<H>  3.0<L>   |  32<H>  |  0.98    Ca    9.1      17 Mar 2023 05:54  Phos  2.5     03-17  Mg     1.6     03-17              Active Medications  MEDICATIONS  (STANDING):  ascorbic acid 500 milliGRAM(s) Oral daily  atorvastatin 20 milliGRAM(s) Oral at bedtime  budesonide 160 MICROgram(s)/formoterol 4.5 MICROgram(s) Inhaler 2 Puff(s) Inhalation two times a day  cholecalciferol 400 Unit(s) Oral daily  cyanocobalamin 1000 MICROGram(s) Oral daily  dextrose 5%. 1000 milliLiter(s) (100 mL/Hr) IV Continuous <Continuous>  dextrose 5%. 1000 milliLiter(s) (50 mL/Hr) IV Continuous <Continuous>  dextrose 50% Injectable 25 Gram(s) IV Push once  dextrose 50% Injectable 12.5 Gram(s) IV Push once  dextrose 50% Injectable 25 Gram(s) IV Push once  furosemide    Tablet 20 milliGRAM(s) Oral daily  furosemide   Injectable 40 milliGRAM(s) IV Push once  glucagon  Injectable 1 milliGRAM(s) IntraMuscular once  insulin glargine Injectable (LANTUS) 15 Unit(s) SubCutaneous at bedtime  insulin lispro (ADMELOG) corrective regimen sliding scale   SubCutaneous every 6 hours  mepolizumab Subcutaneous Injectable 100 milliGRAM(s) SubCutaneous every 4 weeks  metoprolol tartrate 50 milliGRAM(s) Oral two times a day  montelukast 10 milliGRAM(s) Oral daily  multivitamin 1 Tablet(s) Oral daily  Nephro-carlos 1 Tablet(s) Oral daily  pantoprazole  Injectable 40 milliGRAM(s) IV Push every 12 hours  saccharomyces boulardii 250 milliGRAM(s) Oral two times a day  simethicone 80 milliGRAM(s) Chew every 6 hours  sucralfate 1 Gram(s) Oral four times a day  tamsulosin 0.4 milliGRAM(s) Oral at bedtime  tiotropium 2.5 MICROgram(s) Inhaler 2 Puff(s) Inhalation daily    MEDICATIONS  (PRN):  albuterol/ipratropium for Nebulization 3 milliLiter(s) Nebulizer every 6 hours PRN Shortness of Breath and/or Wheezing  dextrose Oral Gel 15 Gram(s) Oral once PRN Blood Glucose LESS THAN 70 milliGRAM(s)/deciliter     Patient seen and examined  wife at bedside  s/p 1 more unit of prbc hb now 8.9  patient now on 4 liters of 02  npo for colo today  d/w left left abscess with IR, patient  underwent US of leg. after review, IR unable to drain abscess    Review of Systems:  General:denies fever chills, headache, weakness  HEENT: denies blurry vision,diffculty swallowing, difficulty hearing, tinnitus  Cardiovascular: denies chest pain  ,palpitations  Pulmonary:denies shortness of breath, cough, wheezing, hemoptysis  Gastrointestinal: denies abdominal pain, constipation, diarrhea,nausea , vomiting, hematochezia  : denies hematuria, dysuria, or incontinence  Neurological: denies weakness, numbness , tingling, dizziness, tremors  MSK: denies muscle pain, difficulty ambulating, swelling, back pain  skin: denies skin rash, itching, burning, or  skin lesions  Psychiatrical: denies mood disturbances, anxierty, feeling depressed, depression , or difficulty sleeping    Objective:  Vitals  T(C): 36.4 (03-17-23 @ 05:43), Max: 36.7 (03-16-23 @ 21:01)  HR: 95 (03-17-23 @ 05:43) (83 - 95)  BP: 109/67 (03-17-23 @ 05:43) (101/64 - 125/67)  RR: 18 (03-17-23 @ 05:43) (16 - 18)  SpO2: 95% (03-17-23 @ 05:43) (93% - 95%)    Physical Exam:  General: comfortable, no acute distress  HEENT: Atraumatic, no LAD, trachea midline, PERRLA  Cardiovascular: normal s1s2, no murmurs, gallops or fricition rubs  Pulmonary: clear to ausculation Bilaterally, no wheezing , rhonchi  Gastrointestinal: soft non tender non distended, no masses felt, no organomegally  Muscloskeletal: no lower extremity edema, intact bilateral lower extremity pulses  Neurological: CN II-12 intact. No focal weakness  Psychiatrical: normal mood, cooperative  SKIN: no rash, lesions or ulcers :: LEFT LOWER EXTREMITY: 20cm long abscess noted    Labs:                          8.9    7.24  )-----------( 263      ( 17 Mar 2023 05:54 )             29.1     03-17    145  |  109<H>  |  10  ----------------------------<  116<H>  3.0<L>   |  32<H>  |  0.98    Ca    9.1      17 Mar 2023 05:54  Phos  2.5     03-17  Mg     1.6     03-17              Active Medications  MEDICATIONS  (STANDING):  ascorbic acid 500 milliGRAM(s) Oral daily  atorvastatin 20 milliGRAM(s) Oral at bedtime  budesonide 160 MICROgram(s)/formoterol 4.5 MICROgram(s) Inhaler 2 Puff(s) Inhalation two times a day  cholecalciferol 400 Unit(s) Oral daily  cyanocobalamin 1000 MICROGram(s) Oral daily  dextrose 5%. 1000 milliLiter(s) (100 mL/Hr) IV Continuous <Continuous>  dextrose 5%. 1000 milliLiter(s) (50 mL/Hr) IV Continuous <Continuous>  dextrose 50% Injectable 25 Gram(s) IV Push once  dextrose 50% Injectable 12.5 Gram(s) IV Push once  dextrose 50% Injectable 25 Gram(s) IV Push once  furosemide    Tablet 20 milliGRAM(s) Oral daily  furosemide   Injectable 40 milliGRAM(s) IV Push once  glucagon  Injectable 1 milliGRAM(s) IntraMuscular once  insulin glargine Injectable (LANTUS) 15 Unit(s) SubCutaneous at bedtime  insulin lispro (ADMELOG) corrective regimen sliding scale   SubCutaneous every 6 hours  mepolizumab Subcutaneous Injectable 100 milliGRAM(s) SubCutaneous every 4 weeks  metoprolol tartrate 50 milliGRAM(s) Oral two times a day  montelukast 10 milliGRAM(s) Oral daily  multivitamin 1 Tablet(s) Oral daily  Nephro-carlos 1 Tablet(s) Oral daily  pantoprazole  Injectable 40 milliGRAM(s) IV Push every 12 hours  saccharomyces boulardii 250 milliGRAM(s) Oral two times a day  simethicone 80 milliGRAM(s) Chew every 6 hours  sucralfate 1 Gram(s) Oral four times a day  tamsulosin 0.4 milliGRAM(s) Oral at bedtime  tiotropium 2.5 MICROgram(s) Inhaler 2 Puff(s) Inhalation daily    MEDICATIONS  (PRN):  albuterol/ipratropium for Nebulization 3 milliLiter(s) Nebulizer every 6 hours PRN Shortness of Breath and/or Wheezing  dextrose Oral Gel 15 Gram(s) Oral once PRN Blood Glucose LESS THAN 70 milliGRAM(s)/deciliter

## 2023-03-17 NOTE — PROGRESS NOTE ADULT - SUBJECTIVE AND OBJECTIVE BOX
[facility]  INFECTIOUS DISEASES   [address] [place] [address]  Tel: [phone]     Fax: [phone]  ========================================================  MD Noman Perry Kaushal, MD Cho, Michelle, MD Sunjit, Jaspal, MD  ========================================================    [id number]  [facility]     Follow up: left thigh abscess and OM    No fever, some discharge from left thigh.  Had blood in stool and drop in H/H so had transfusion last night again, and EGD on 3/13 showed gastritis and peptic ulcer.  Now plan for colonoscopy today.     PAST MEDICAL & SURGICAL HISTORY:  Diabetes Mellitus, Type II  CAD (Coronary Artery Disease)  s/p 3v CABG ; stents placed in [place]   Dyslipidemia  Osteomyelitis  COPD (chronic obstructive pulmonary disease)  on 2L at home and BiPAP at night; intubated   Hypertension  PVD (peripheral vascular disease)  History of PAT (paroxysmal atrial tachycardia)  Asthma with COPD  BPH (benign prostatic hyperplasia)  Acute osteomyelitis  CABG (Coronary Artery Bypass Graft)    Compound fracture  left leg  S/P primary angioplasty with coronary stent  H/O drainage of abscess  Left femur 2021    Social Hx: No current smoking, ETOH or drugs     FAMILY HISTORY:  Family history of diabetes mellitus (Sibling)    Family hx of lung cancer  brother,  age 82, used to smoke with pt    Allergies  No Known Allergies    Intolerances  shellfish (Nausea)    Antibiotics:  Ciprofloxacin      REVIEW OF SYSTEMS:  CONSTITUTIONAL:  No Fever or chills  HEENT:  No diplopia or blurred vision.  No sore throat or runny nose.  CARDIOVASCULAR:  No chest pain or SOB.  RESPIRATORY:  No cough, shortness of breath, PND or orthopnea.  GASTROINTESTINAL:  No nausea, vomiting or diarrhea.  GENITOURINARY:  No dysuria, frequency or urgency. No Blood in urine  MUSCULOSKELETAL:  left thigh pain and drainage   SKIN:  No change in skin, hair or nails.  NEUROLOGIC:  No paresthesias or weakness.  PSYCHIATRIC:  No disorder of thought or mood.  ENDOCRINE:  No heat or cold intolerance, polyuria or polydipsia.  HEMATOLOGICAL:  No easy bruising or bleeding.     Physical Exam:  Vital Signs Last 24 Hrs  T(C): 37 (17 Mar 2023 12:47), Max: 37 (17 Mar 2023 12:47)  T(F): 98.6 (17 Mar 2023 12:47), Max: 98.6 (17 Mar 2023 12:47)  HR: 75 (17 Mar 2023 12:47) (75 - 95)  BP: 105/62 (17 Mar 2023 12:47) (101/64 - 125/67)  BP(mean): --  RR: 17 (17 Mar 2023 12:47) (16 - 18)  SpO2: 100% (17 Mar 2023 12:47) (93% - 100%)  Parameters below as of 17 Mar 2023 12:47  Patient On (Oxygen Delivery Method): nasal cannula  O2 Flow (L/min): 3  GEN: NAD  HEENT: normocephalic and atraumatic. EOMI. PERRL.    NECK: Supple.  No lymphadenopathy   LUNGS: poor air movement   HEART: Regular rate and rhythm   ABDOMEN: Soft, nontender, and nondistended.  Positive bowel sounds.    : No CVA tenderness  EXTREMITIES: left thigh with scar in lateral side with fluctuation and small opening with yellow discharge   NEUROLOGIC: grossly intact.  PSYCHIATRIC: Appropriate affect .  SKIN: No rash     Labs:                        8.9    7.24  )-----------( 263      ( 17 Mar 2023 05:54 )             29.1         145  |  109<H>  |  10  ----------------------------<  116<H>  3.0<L>   |  32<H>  |  0.98    Ca    9.1      17 Mar 2023 05:54  Phos  2.5       Mg     1.6         Culture - Abscess with Gram Stain (collected 03-10-23 @ 11:50)  Source: .Abscess left leg  Final Report (03-15-23 @ 19:04):    Few Corynebacterium jeikeium    "Susceptibilities not performed"    Culture - Abscess with Gram Stain (collected 23 @ 13:50)  Source: .Abscess left thigh  Final Report (23 @ 14:49):    Moderate Coag Negative Staphylococcus "Susceptibilities not performed"    Multiple Morphological Strains    Culture - Urine (collected 23 @ 20:15)  Source: Clean Catch Clean Catch (Midstream)  Final Report (23 @ 23:02):    <10,000 CFU/mL Normal Urogenital Shayy    Culture - Blood (collected 23 @ 15:53)  Source: .Blood Blood-Peripheral  Final Report (23 @ 23:01):    No Growth Final    Culture - Blood (collected 23 @ 15:43)  Source: .Blood Blood-Peripheral  Final Report (23 @ 23:01):    No Growth Final    WBC Count: 7.24 K/uL (23 @ 05:54)  WBC Count: 8.30 K/uL (23 @ 05:25)  WBC Count: 8.81 K/uL (03-15-23 @ 08:10)  WBC Count: 10.11 K/uL (23 @ 06:21)  WBC Count: 7.99 K/uL (23 @ 07:30)    Creatinine, Serum: 0.98 mg/dL (23 @ 05:54)  Creatinine, Serum: 0.94 mg/dL (23 @ 05:25)  Creatinine, Serum: 1.10 mg/dL (03-15-23 @ 08:10)  Creatinine, Serum: 1.10 mg/dL (23 @ 06:21)  Creatinine, Serum: 1.00 mg/dL (23 @ 07:30)    C-Reactive Protein, Serum: 45 mg/L (23 @ 15:53)    Sedimentation Rate, Erythrocyte: 39 mm/hr (23 @ 15:53)    COVID-19 PCR: NotDetec (23 @ 14:48)  COVID-19 PCR: NotDetec (23 @ 18:44)  SARS-CoV-2 Result: NotDetec (23 @ 15:53)      All imaging and other data have been reviewed.  < from: MR Femur No Cont, Left (22 @ 13:40) >  IMPRESSION:  Chronic osteomyelitis with deformity of bone and with scattered areas of   T2 hyperintensity, less prominent than on the previous MRI of 2021,   however cannot exclude superimposed acute osteomyelitis/intraosseous   abscess.  Fluid tract extending from the chronic bony defect is contiguous with a   soft tissue abscess centered deep to the vastus intermedius measuring  12.9 cm craniocaudally, with surrounding surrounding muscle and soft   tissue edema.    Assessment and Plan:   83yo man with PMH of HTN, COPD on home O2, CAD s/p CABG, PVD s/p stents in b/l legs and left femoral fracture more than 50 years ago, was admitted at [facility] in 2021 with left  leg pain on the site of old fracture after CT showed possible intramedullary abscess. He had pain and infection in the old fracture area at least 3-4 times in the past, had multiple surgeries   and drainage of area with a long course of antibiotic treatment.  MRI showed collection, intraosseous abscess  S/P IR drain placement on 21  IR culture NGTD but had another culture with enterobacter.   Completed 6 weeks of ceftriaxone 2gm with PICC line in 2022  He was seen in the clinic and was started on suppressive ciprofloxacin for good oral bioavailability and also based on the culture.  MRI 2022 done showed 12.9cm collection with OM.   He stopped cipro sometimes last year and now feels more pain and swelling in area and started draining again.   Now MRI with 22cm collection.     Had drop in H/H and rectal bleeding for which had EGD 3/13 with Gastritis and peptic ulcer. Now plan for colonoscopy today, now stable H/H after transfusion last night.   no fever or leukocytosis, so cefepime was stopped on 3/14 pending drain placement and culture results.     Recommendations:  - Blood cultures negative   - MRI result noted, IR has been consulted for possible drain placement but for now on hold for GI bleed   - Wound discharge sent for culture twice both times with skin shayy, CoNS and corynebacterium   - GI work up noted s/p EGD on 3/13 with gastritis and peptic ulcer, now for colonoscopy      Will follow.  Discussed with his wife at the bedside.     Brandi العلي MD  Division of Infectious Diseases   Please call ID service at [phone] with any question.      35 minutes spent on total encounter assessing patient, examination, chart review, counseling and coordinating care by the attending physician/nurse/care manager.   [facility]  INFECTIOUS DISEASES   [address] [place] [address]  Tel: [phone]     Fax: [phone]  ========================================================  MD Noman Perry Kaushal, MD Cho, Michelle, MD Sunjit, Jaspal, MD  ========================================================    [id number]  [facility]     Follow up: left thigh abscess and OM    No fever, some discharge from left thigh.  Had blood in stool and drop in H/H so had transfusion last night again, and EGD on 3/13 showed gastritis and peptic ulcer.  Now plan for colonoscopy today.     PAST MEDICAL & SURGICAL HISTORY:  Diabetes Mellitus, Type II  CAD (Coronary Artery Disease)  s/p 3v CABG ; stents placed in [place]   Dyslipidemia  Osteomyelitis  COPD (chronic obstructive pulmonary disease)  on 2L at home and BiPAP at night; intubated   Hypertension  PVD (peripheral vascular disease)  History of PAT (paroxysmal atrial tachycardia)  Asthma with COPD  BPH (benign prostatic hyperplasia)  Acute osteomyelitis  CABG (Coronary Artery Bypass Graft)    Compound fracture  left leg  S/P primary angioplasty with coronary stent  H/O drainage of abscess  Left femur 2021    Social Hx: No current smoking, ETOH or drugs     FAMILY HISTORY:  Family history of diabetes mellitus (Sibling)    Family hx of lung cancer  brother,  age 82, used to smoke with pt    Allergies  No Known Allergies    Intolerances  shellfish (Nausea)    Antibiotics:  Ciprofloxacin      REVIEW OF SYSTEMS:  CONSTITUTIONAL:  No Fever or chills  HEENT:  No diplopia or blurred vision.  No sore throat or runny nose.  CARDIOVASCULAR:  No chest pain or SOB.  RESPIRATORY:  No cough, shortness of breath, PND or orthopnea.  GASTROINTESTINAL:  No nausea, vomiting or diarrhea.  GENITOURINARY:  No dysuria, frequency or urgency. No Blood in urine  MUSCULOSKELETAL:  left thigh pain and drainage   SKIN:  No change in skin, hair or nails.  NEUROLOGIC:  No paresthesias or weakness.  PSYCHIATRIC:  No disorder of thought or mood.  ENDOCRINE:  No heat or cold intolerance, polyuria or polydipsia.  HEMATOLOGICAL:  No easy bruising or bleeding.     Physical Exam:  Vital Signs Last 24 Hrs  T(C): 37 (17 Mar 2023 12:47), Max: 37 (17 Mar 2023 12:47)  T(F): 98.6 (17 Mar 2023 12:47), Max: 98.6 (17 Mar 2023 12:47)  HR: 75 (17 Mar 2023 12:47) (75 - 95)  BP: 105/62 (17 Mar 2023 12:47) (101/64 - 125/67)  BP(mean): --  RR: 17 (17 Mar 2023 12:47) (16 - 18)  SpO2: 100% (17 Mar 2023 12:47) (93% - 100%)  Parameters below as of 17 Mar 2023 12:47  Patient On (Oxygen Delivery Method): nasal cannula  O2 Flow (L/min): 3  GEN: NAD  HEENT: normocephalic and atraumatic. EOMI. PERRL.    NECK: Supple.  No lymphadenopathy   LUNGS: poor air movement   HEART: Regular rate and rhythm   ABDOMEN: Soft, nontender, and nondistended.  Positive bowel sounds.    : No CVA tenderness  EXTREMITIES: left thigh with scar in lateral side with fluctuation and small opening with yellow discharge   NEUROLOGIC: grossly intact.  PSYCHIATRIC: Appropriate affect .  SKIN: No rash     Labs:                        8.9    7.24  )-----------( 263      ( 17 Mar 2023 05:54 )             29.1         145  |  109<H>  |  10  ----------------------------<  116<H>  3.0<L>   |  32<H>  |  0.98    Ca    9.1      17 Mar 2023 05:54  Phos  2.5       Mg     1.6         Culture - Abscess with Gram Stain (collected 03-10-23 @ 11:50)  Source: .Abscess left leg  Final Report (03-15-23 @ 19:04):    Few Corynebacterium jeikeium    "Susceptibilities not performed"    Culture - Abscess with Gram Stain (collected 23 @ 13:50)  Source: .Abscess left thigh  Final Report (23 @ 14:49):    Moderate Coag Negative Staphylococcus "Susceptibilities not performed"    Multiple Morphological Strains    Culture - Urine (collected 23 @ 20:15)  Source: Clean Catch Clean Catch (Midstream)  Final Report (23 @ 23:02):    <10,000 CFU/mL Normal Urogenital Shayy    Culture - Blood (collected 23 @ 15:53)  Source: .Blood Blood-Peripheral  Final Report (23 @ 23:01):    No Growth Final    Culture - Blood (collected 23 @ 15:43)  Source: .Blood Blood-Peripheral  Final Report (23 @ 23:01):    No Growth Final    WBC Count: 7.24 K/uL (23 @ 05:54)  WBC Count: 8.30 K/uL (23 @ 05:25)  WBC Count: 8.81 K/uL (03-15-23 @ 08:10)  WBC Count: 10.11 K/uL (23 @ 06:21)  WBC Count: 7.99 K/uL (23 @ 07:30)    Creatinine, Serum: 0.98 mg/dL (23 @ 05:54)  Creatinine, Serum: 0.94 mg/dL (23 @ 05:25)  Creatinine, Serum: 1.10 mg/dL (03-15-23 @ 08:10)  Creatinine, Serum: 1.10 mg/dL (23 @ 06:21)  Creatinine, Serum: 1.00 mg/dL (23 @ 07:30)    C-Reactive Protein, Serum: 45 mg/L (23 @ 15:53)    Sedimentation Rate, Erythrocyte: 39 mm/hr (23 @ 15:53)    COVID-19 PCR: NotDetec (23 @ 14:48)  COVID-19 PCR: NotDetec (23 @ 18:44)  SARS-CoV-2 Result: NotDetec (23 @ 15:53)      All imaging and other data have been reviewed.  < from: MR Femur No Cont, Left (22 @ 13:40) >  IMPRESSION:  Chronic osteomyelitis with deformity of bone and with scattered areas of   T2 hyperintensity, less prominent than on the previous MRI of 2021,   however cannot exclude superimposed acute osteomyelitis/intraosseous   abscess.  Fluid tract extending from the chronic bony defect is contiguous with a   soft tissue abscess centered deep to the vastus intermedius measuring  12.9 cm craniocaudally, with surrounding surrounding muscle and soft   tissue edema.    Assessment and Plan:   83yo man with PMH of HTN, COPD on home O2, CAD s/p CABG, PVD s/p stents in b/l legs and left femoral fracture more than 50 years ago, was admitted at [facility] in 2021 with left  leg pain on the site of old fracture after CT showed possible intramedullary abscess. He had pain and infection in the old fracture area at least 3-4 times in the past, had multiple surgeries   and drainage of area with a long course of antibiotic treatment.  MRI showed collection, intraosseous abscess  S/P IR drain placement on 21  IR culture NGTD but had another culture with enterobacter.   Completed 6 weeks of ceftriaxone 2gm with PICC line in 2022  He was seen in the clinic and was started on suppressive ciprofloxacin for good oral bioavailability and also based on the culture.  MRI 2022 done showed 12.9cm collection with OM.   He stopped cipro sometimes last year and now feels more pain and swelling in area and started draining again.   Now MRI with 22cm collection.     Had drop in H/H and rectal bleeding for which had EGD 3/13 with Gastritis and peptic ulcer. Now plan for colonoscopy today, now stable H/H after transfusion last night.   no fever or leukocytosis, so cefepime was stopped on 3/14 but since plan changed for drain placement will restart it again.   US showed 4.5cm collection (MRI that reported 22cmm, as per IR it was whole infected areas including tissue and bone).    Recommendations:  - Blood cultures negative   - MRI result noted, IR has been consulted for possible drain placement but for now on hold for GI bleed   - Wound discharge sent for culture twice both times with skin shayy, CoNS and corynebacterium   - GI work up noted s/p EGD on 3/13 with gastritis and peptic ulcer, now for colonoscopy    - Restart Cefepime 2gm q8  - Will place PICC next week to prepare him for discharge if H/H stays stable.     Will follow.  Discussed with his wife at the bedside.     Brandi العلي MD  Division of Infectious Diseases   Please call ID service at [phone] with any question.      35 minutes spent on total encounter assessing patient, examination, chart review, counseling and coordinating care by the attending physician/nurse/care manager.

## 2023-03-17 NOTE — PROGRESS NOTE ADULT - NSPROGADDITIONALINFOA_GEN_ALL_CORE
patient medically acute: drop in H and H: needs prbc. Will have Gi followup and call IR for drain
FOR COLO today  giving lasix for hypoxemia post tranfusion
patient medically acute: drop in H and H: needs prbc. Will have Gi followup and call IR for drain

## 2023-03-17 NOTE — PROGRESS NOTE ADULT - NS ATTEND AMEND GEN_ALL_CORE FT
I have personally seen and examined the patient in detail.  I have spoken to the provider regarding the assessment and plan of care.  I have made changes to the note accordingly.
NO active cardiac conditions.  Stable CV pov
I have personally seen and examined the patient in detail.  I have spoken to the provider regarding the assessment and plan of care.  I have made changes to the note accordingly.
No active cardiac conditions.  To follow closely while admitted.
cad without acute ischemia  asa on hold  hx of pat  no sig vol ol, cont po lasix  resume ac when able
optimized for endoscopic procedure. No signs of significant ischemia or volume overload. cont current care. Further cardiac workup will depend on clinical course.

## 2023-03-17 NOTE — CHART NOTE - NSCHARTNOTEFT_GEN_A_CORE
colonscopy with bx and clip  cecal polyp  cecal avm     plan  check cbc  if rebleed capsule  f/u bx  hemrohoidal treatment

## 2023-03-17 NOTE — PROGRESS NOTE ADULT - SUBJECTIVE AND OBJECTIVE BOX
Date/Time Patient Seen:  		  Referring MD:   Data Reviewed	       Patient is a 82y old  Male who presents with a chief complaint of Left  thigh draining sinus (16 Mar 2023 12:07)      Subjective/HPI     PAST MEDICAL & SURGICAL HISTORY:  Diabetes Mellitus, Type II    CAD (Coronary Artery Disease)  s/p 3v CABG 2004; stents placed in [place] in 2019    Dyslipidemia    Asymptomatic Peripheral Vascular Disease    Osteomyelitis    COPD (chronic obstructive pulmonary disease)  on 2L at home and BiPAP at night; intubated 6/18    Diabetes mellitus    Hypertension    PVD (peripheral vascular disease)    History of PAT (paroxysmal atrial tachycardia)    Asthma with COPD    BPH (benign prostatic hyperplasia)    Acute osteomyelitis    CABG (Coronary Artery Bypass Graft)  2004    Compound fracture  left leg    S/P primary angioplasty with coronary stent    H/O drainage of abscess  Left femur 12/2021          Medication list         MEDICATIONS  (STANDING):  ascorbic acid 500 milliGRAM(s) Oral daily  atorvastatin 20 milliGRAM(s) Oral at bedtime  budesonide 160 MICROgram(s)/formoterol 4.5 MICROgram(s) Inhaler 2 Puff(s) Inhalation two times a day  cholecalciferol 400 Unit(s) Oral daily  cyanocobalamin 1000 MICROGram(s) Oral daily  dextrose 5%. 1000 milliLiter(s) (50 mL/Hr) IV Continuous <Continuous>  dextrose 5%. 1000 milliLiter(s) (100 mL/Hr) IV Continuous <Continuous>  dextrose 50% Injectable 12.5 Gram(s) IV Push once  dextrose 50% Injectable 25 Gram(s) IV Push once  dextrose 50% Injectable 25 Gram(s) IV Push once  furosemide    Tablet 20 milliGRAM(s) Oral daily  glucagon  Injectable 1 milliGRAM(s) IntraMuscular once  insulin glargine Injectable (LANTUS) 15 Unit(s) SubCutaneous at bedtime  insulin lispro (ADMELOG) corrective regimen sliding scale   SubCutaneous at bedtime  insulin lispro (ADMELOG) corrective regimen sliding scale   SubCutaneous three times a day before meals  mepolizumab Subcutaneous Injectable 100 milliGRAM(s) SubCutaneous every 4 weeks  metoprolol tartrate 50 milliGRAM(s) Oral two times a day  montelukast 10 milliGRAM(s) Oral daily  multivitamin 1 Tablet(s) Oral daily  Nephro-carlos 1 Tablet(s) Oral daily  pantoprazole  Injectable 40 milliGRAM(s) IV Push every 12 hours  saccharomyces boulardii 250 milliGRAM(s) Oral two times a day  simethicone 80 milliGRAM(s) Chew every 6 hours  sucralfate 1 Gram(s) Oral four times a day  tamsulosin 0.4 milliGRAM(s) Oral at bedtime  tiotropium 2.5 MICROgram(s) Inhaler 2 Puff(s) Inhalation daily    MEDICATIONS  (PRN):  albuterol/ipratropium for Nebulization 3 milliLiter(s) Nebulizer every 6 hours PRN Shortness of Breath and/or Wheezing  dextrose Oral Gel 15 Gram(s) Oral once PRN Blood Glucose LESS THAN 70 milliGRAM(s)/deciliter         Vitals log        ICU Vital Signs Last 24 Hrs  T(C): 36.4 (17 Mar 2023 05:43), Max: 36.7 (16 Mar 2023 21:01)  T(F): 97.6 (17 Mar 2023 05:43), Max: 98.1 (16 Mar 2023 21:01)  HR: 95 (17 Mar 2023 05:43) (83 - 95)  BP: 109/67 (17 Mar 2023 05:43) (101/64 - 125/67)  BP(mean): --  ABP: --  ABP(mean): --  RR: 18 (17 Mar 2023 05:43) (16 - 18)  SpO2: 95% (17 Mar 2023 05:43) (93% - 98%)    O2 Parameters below as of 17 Mar 2023 05:43  Patient On (Oxygen Delivery Method): BiPAP/CPAP                 Input and Output:  I&O's Detail    15 Mar 2023 07:01  -  16 Mar 2023 07:00  --------------------------------------------------------  IN:    PRBCs (Packed Red Blood Cells): 302 mL  Total IN: 302 mL    OUT:    Voided (mL): 720 mL  Total OUT: 720 mL    Total NET: -418 mL          Lab Data                        7.4    8.30  )-----------( 250      ( 16 Mar 2023 05:25 )             24.8     03-16    143  |  110<H>  |  14  ----------------------------<  131<H>  3.6   |  31  |  0.94    Ca    8.8      16 Mar 2023 05:25  Phos  2.4     03-16  Mg     1.5     03-16              Review of Systems	      Objective     Physical Examination    heart s1s2  lung dc BS  head nc      Pertinent Lab findings & Imaging      Eliecer:  NO   Adequate UO     I&O's Detail    15 Mar 2023 07:01  -  16 Mar 2023 07:00  --------------------------------------------------------  IN:    PRBCs (Packed Red Blood Cells): 302 mL  Total IN: 302 mL    OUT:    Voided (mL): 720 mL  Total OUT: 720 mL    Total NET: -418 mL               Discussed with:     Cultures:	        Radiology

## 2023-03-17 NOTE — PROGRESS NOTE ADULT - SUBJECTIVE AND OBJECTIVE BOX
[facility] -- Saud Aguilar, Medina Capps Patel, Savella, Goodger  Office # [phone]    Follow Up:      Subjective/Observations:     REVIEW OF SYSTEMS: All other review of systems is negative unless indicated above  PAST MEDICAL & SURGICAL HISTORY:  Diabetes Mellitus, Type II      CAD (Coronary Artery Disease)  s/p 3v CABG 2004; stents placed in [place] in 2019      Dyslipidemia      Osteomyelitis      COPD (chronic obstructive pulmonary disease)  on 2L at home and BiPAP at night; intubated 6/18      Hypertension      PVD (peripheral vascular disease)      History of PAT (paroxysmal atrial tachycardia)      Asthma with COPD      BPH (benign prostatic hyperplasia)      Acute osteomyelitis      CABG (Coronary Artery Bypass Graft)  2004      Compound fracture  left leg      S/P primary angioplasty with coronary stent      H/O drainage of abscess  Left femur 12/2021        MEDICATIONS  (STANDING):  ascorbic acid 500 milliGRAM(s) Oral daily  atorvastatin 20 milliGRAM(s) Oral at bedtime  budesonide 160 MICROgram(s)/formoterol 4.5 MICROgram(s) Inhaler 2 Puff(s) Inhalation two times a day  cholecalciferol 400 Unit(s) Oral daily  cyanocobalamin 1000 MICROGram(s) Oral daily  dextrose 5%. 1000 milliLiter(s) (100 mL/Hr) IV Continuous <Continuous>  dextrose 5%. 1000 milliLiter(s) (50 mL/Hr) IV Continuous <Continuous>  dextrose 50% Injectable 25 Gram(s) IV Push once  dextrose 50% Injectable 12.5 Gram(s) IV Push once  dextrose 50% Injectable 25 Gram(s) IV Push once  furosemide    Tablet 20 milliGRAM(s) Oral daily  glucagon  Injectable 1 milliGRAM(s) IntraMuscular once  insulin glargine Injectable (LANTUS) 15 Unit(s) SubCutaneous at bedtime  insulin lispro (ADMELOG) corrective regimen sliding scale   SubCutaneous every 6 hours  mepolizumab Subcutaneous Injectable 100 milliGRAM(s) SubCutaneous every 4 weeks  metoprolol tartrate 50 milliGRAM(s) Oral two times a day  montelukast 10 milliGRAM(s) Oral daily  multivitamin 1 Tablet(s) Oral daily  Nephro-carlos 1 Tablet(s) Oral daily  pantoprazole  Injectable 40 milliGRAM(s) IV Push every 12 hours  saccharomyces boulardii 250 milliGRAM(s) Oral two times a day  simethicone 80 milliGRAM(s) Chew every 6 hours  sucralfate 1 Gram(s) Oral four times a day  tamsulosin 0.4 milliGRAM(s) Oral at bedtime  tiotropium 2.5 MICROgram(s) Inhaler 2 Puff(s) Inhalation daily    MEDICATIONS  (PRN):  albuterol/ipratropium for Nebulization 3 milliLiter(s) Nebulizer every 6 hours PRN Shortness of Breath and/or Wheezing  dextrose Oral Gel 15 Gram(s) Oral once PRN Blood Glucose LESS THAN 70 milliGRAM(s)/deciliter    Allergies    [This allergen will not trigger allergy alert] IV DYE, IODINE CONTRAST (Unknown)  [This allergen will not trigger allergy alert] NKDA (Unknown)  latex (Unknown)    Intolerances    shellfish (Nausea)    Vital Signs Last 24 Hrs  T(C): 36.4 (17 Mar 2023 05:43), Max: 36.7 (16 Mar 2023 21:01)  T(F): 97.6 (17 Mar 2023 05:43), Max: 98.1 (16 Mar 2023 21:01)  HR: 95 (17 Mar 2023 05:43) (83 - 95)  BP: 109/67 (17 Mar 2023 05:43) (101/64 - 125/67)  BP(mean): --  RR: 18 (17 Mar 2023 05:43) (16 - 18)  SpO2: 95% (17 Mar 2023 05:43) (93% - 98%)    Parameters below as of 17 Mar 2023 05:43  Patient On (Oxygen Delivery Method): BiPAP/CPAP      I&O's Summary      PHYSICAL EXAM:  TELE:   Constitutional: NAD, awake and alert, well-developed  HEENT: Moist Mucous Membranes, Anicteric  Pulmonary: Non-labored, breath sounds are clear bilaterally, No wheezing, rales or rhonchi  Cardiovascular: Regular, S1 and S2, No murmurs, rubs, gallops or clicks  Gastrointestinal: Bowel Sounds present, soft, nontender.   Lymph: No peripheral edema. No lymphadenopathy.  Skin: No visible rashes or ulcers.  Psych:  Mood & affect appropriate  LABS: All Labs Reviewed:                        8.9    7.24  )-----------( 263      ( 17 Mar 2023 05:54 )             29.1                         7.4    8.30  )-----------( 250      ( 16 Mar 2023 05:25 )             24.8                         7.6    x     )-----------( x        ( 15 Mar 2023 12:20 )             24.7     16 Mar 2023 05:25    143    |  110    |  14     ----------------------------<  131    3.6     |  31     |  0.94   15 Mar 2023 08:10    142    |  110    |  16     ----------------------------<  158    3.7     |  29     |  1.10     Ca    8.8        16 Mar 2023 05:25  Ca    9.1        15 Mar 2023 08:10  Phos  2.4       16 Mar 2023 05:25  Mg     1.5       16 Mar 2023 05:25             [facility] -- Saud Aguilar, Medina Capps Patel, Savella, Goodger  Office # [phone]    Follow Up:  CAD s/p CABG, HTN, Cardiac Optimization    Subjective/Observations: Awake and alert.  On NC at 3L/min, though, states , he does not need it.  Denies CP or palpitations.  Non-orthpneic.  denies melena this morning    REVIEW OF SYSTEMS: All other review of systems is negative unless indicated above  PAST MEDICAL & SURGICAL HISTORY:  Diabetes Mellitus, Type II  CAD (Coronary Artery Disease)  s/p 3v CABG 2004; stents placed in [place] in 2019  Dyslipidemia  Osteomyelitis  COPD (chronic obstructive pulmonary disease)  on 2L at home and BiPAP at night; intubated 6/18  Hypertension  PVD (peripheral vascular disease)  History of PAT (paroxysmal atrial tachycardia)  Asthma with COPD  BPH (benign prostatic hyperplasia)  Acute osteomyelitis  CABG (Coronary Artery Bypass Graft)  2004  Compound fracture  left leg  S/P primary angioplasty with coronary stent  H/O drainage of abscess  Left femur 12/2021    MEDICATIONS  (STANDING):  ascorbic acid 500 milliGRAM(s) Oral daily  atorvastatin 20 milliGRAM(s) Oral at bedtime  budesonide 160 MICROgram(s)/formoterol 4.5 MICROgram(s) Inhaler 2 Puff(s) Inhalation two times a day  cholecalciferol 400 Unit(s) Oral daily  cyanocobalamin 1000 MICROGram(s) Oral daily  dextrose 5%. 1000 milliLiter(s) (100 mL/Hr) IV Continuous <Continuous>  dextrose 5%. 1000 milliLiter(s) (50 mL/Hr) IV Continuous <Continuous>  dextrose 50% Injectable 25 Gram(s) IV Push once  dextrose 50% Injectable 12.5 Gram(s) IV Push once  dextrose 50% Injectable 25 Gram(s) IV Push once  furosemide    Tablet 20 milliGRAM(s) Oral daily  glucagon  Injectable 1 milliGRAM(s) IntraMuscular once  insulin glargine Injectable (LANTUS) 15 Unit(s) SubCutaneous at bedtime  insulin lispro (ADMELOG) corrective regimen sliding scale   SubCutaneous every 6 hours  mepolizumab Subcutaneous Injectable 100 milliGRAM(s) SubCutaneous every 4 weeks  metoprolol tartrate 50 milliGRAM(s) Oral two times a day  montelukast 10 milliGRAM(s) Oral daily  multivitamin 1 Tablet(s) Oral daily  Nephro-carlos 1 Tablet(s) Oral daily  pantoprazole  Injectable 40 milliGRAM(s) IV Push every 12 hours  saccharomyces boulardii 250 milliGRAM(s) Oral two times a day  simethicone 80 milliGRAM(s) Chew every 6 hours  sucralfate 1 Gram(s) Oral four times a day  tamsulosin 0.4 milliGRAM(s) Oral at bedtime  tiotropium 2.5 MICROgram(s) Inhaler 2 Puff(s) Inhalation daily    MEDICATIONS  (PRN):  albuterol/ipratropium for Nebulization 3 milliLiter(s) Nebulizer every 6 hours PRN Shortness of Breath and/or Wheezing  dextrose Oral Gel 15 Gram(s) Oral once PRN Blood Glucose LESS THAN 70 milliGRAM(s)/deciliter    Allergies    [This allergen will not trigger allergy alert] IV DYE, IODINE CONTRAST (Unknown)  [This allergen will not trigger allergy alert] NKDA (Unknown)  latex (Unknown)    Intolerances    shellfish (Nausea)    Vital Signs Last 24 Hrs  T(C): 36.4 (17 Mar 2023 05:43), Max: 36.7 (16 Mar 2023 21:01)  T(F): 97.6 (17 Mar 2023 05:43), Max: 98.1 (16 Mar 2023 21:01)  HR: 95 (17 Mar 2023 05:43) (83 - 95)  BP: 109/67 (17 Mar 2023 05:43) (101/64 - 125/67)  BP(mean): --  RR: 18 (17 Mar 2023 05:43) (16 - 18)  SpO2: 95% (17 Mar 2023 05:43) (93% - 98%)    Parameters below as of 17 Mar 2023 05:43  Patient On (Oxygen Delivery Method): BiPAP/CPAP    I&O's Summary    PHYSICAL EXAM:  TELE: Not on tele  Constitutional: NAD, awake and alert, well-developed  HEENT: Moist Mucous Membranes, Anicteric  Pulmonary: Non-labored, breath sounds are clear but diminished bilaterally, No wheezing, rales or rhonchi  Cardiovascular: Regular, S1 and S2, No murmurs, rubs, gallops or clicks  Gastrointestinal: Bowel Sounds present, soft, nontender.   Lymph: 1+ BLE edema. No lymphadenopathy.  Skin: No visible rashes or ulcers.  Psych:  Mood & affect: Flat    LABS: All Labs Reviewed:                        8.9    7.24  )-----------( 263      ( 17 Mar 2023 05:54 )             29.1                         7.4    8.30  )-----------( 250      ( 16 Mar 2023 05:25 )             24.8                         7.6    x     )-----------( x        ( 15 Mar 2023 12:20 )             24.7     16 Mar 2023 05:25    143    |  110    |  14     ----------------------------<  131    3.6     |  31     |  0.94   15 Mar 2023 08:10    142    |  110    |  16     ----------------------------<  158    3.7     |  29     |  1.10     Ca    8.8        16 Mar 2023 05:25  Ca    9.1        15 Mar 2023 08:10  Phos  2.4       16 Mar 2023 05:25  Mg     1.5       16 Mar 2023 05:25      EXAM:  ECHO TTE WO CON COMP W DOPP    PROCEDURE DATE:  12/20/2021      INTERPRETATION:  INDICATION: Ischemic heart disease  sonographer KL    Blood Pressure 123/70    Height 180.3 cm     Weight 97.5 kg       BSA 2.2   sq m    Dimensions:  LA 3.0       Normal Values: 2.0 - 4.0 cm  Ao 3.5        Normal Values: 2.0 - 3.8 cm  SEPTUM 1.3       Normal Values: 0.6 - 1.2 cm  PWT 1.0       Normal Values: 0.6 - 1.1 cm  LVIDd 4.3         Normal Values: 3.0 - 5.6 cm  LVIDs 1.8         Normal Values: 1.8 - 4.0 cm    OBSERVATIONS:  Technically difficult and limited study  Mitral Valve: normal, trace physiologic MR.  Aortic Valve/Aorta: Sclerotic trileaflet aortic valve with normal opening.  Tricuspid Valve: Not well-visualized  Pulmonic Valve: Not well-visualized  Left Atrium: normal  Right Atrium: Not well-visualized  Left Ventricle: The left ventricular endocardium is not well-visualized.   Overall normal systolic function with an estimated LVEF of 55%. Cannot   evaluate for segmental abnormalities  Right Ventricle: Not well-visualized  Pericardium: no significant pericardial effusion.  Pulmonary/RV Pressure: estimated PA systolic pressure of 28 mmHg  LV diastolic dysfunction is present    IMPRESSION:  Technically difficult and limited study  The left ventricular endocardium is not well-visualized. Overall normal   systolic function with an estimated LVEF of 55-60%. Cannot evaluate for   segmental abnormalities  The right ventricle is not well-visualized  Sclerotic trileaflet aortic valve, without AI.  Trace physiologic MR  No significant pericardial effusion.    --- End of Report ---      ARISTIDES LEE MD; Attending Cardiologist  This document has been electronically signed. Dec 21 2021  1:38PM    Ventricular Rate 96 BPM    Atrial Rate 96 BPM    P-R Interval 152 ms    QRS Duration 98 ms    Q-T Interval 352 ms    QTC Calculation(Bazett) 444 ms    P Axis 72 degrees    R Axis 73 degrees    T Axis 61 degrees    Diagnosis Line Normal sinus rhythm  Normal ECG  When compared with ECG of 06-MAR-2023 19:32,  No significant change was found  Confirmed by Jonas Capps MD ([id number]) on 3/15/2023 3:08:51 PM

## 2023-03-18 LAB
ANION GAP SERPL CALC-SCNC: 3 MMOL/L — LOW (ref 5–17)
BASOPHILS # BLD AUTO: 0.02 K/UL — SIGNIFICANT CHANGE UP (ref 0–0.2)
BASOPHILS NFR BLD AUTO: 0.2 % — SIGNIFICANT CHANGE UP (ref 0–2)
BUN SERPL-MCNC: 10 MG/DL — SIGNIFICANT CHANGE UP (ref 7–23)
CALCIUM SERPL-MCNC: 9.1 MG/DL — SIGNIFICANT CHANGE UP (ref 8.5–10.1)
CHLORIDE SERPL-SCNC: 107 MMOL/L — SIGNIFICANT CHANGE UP (ref 96–108)
CO2 SERPL-SCNC: 34 MMOL/L — HIGH (ref 22–31)
CREAT SERPL-MCNC: 1.1 MG/DL — SIGNIFICANT CHANGE UP (ref 0.5–1.3)
EGFR: 67 ML/MIN/1.73M2 — SIGNIFICANT CHANGE UP
EOSINOPHIL # BLD AUTO: 0.04 K/UL — SIGNIFICANT CHANGE UP (ref 0–0.5)
EOSINOPHIL NFR BLD AUTO: 0.5 % — SIGNIFICANT CHANGE UP (ref 0–6)
GLUCOSE SERPL-MCNC: 134 MG/DL — HIGH (ref 70–99)
HCT VFR BLD CALC: 29.9 % — LOW (ref 39–50)
HGB BLD-MCNC: 9.4 G/DL — LOW (ref 13–17)
IMM GRANULOCYTES NFR BLD AUTO: 0.6 % — SIGNIFICANT CHANGE UP (ref 0–0.9)
LYMPHOCYTES # BLD AUTO: 1.12 K/UL — SIGNIFICANT CHANGE UP (ref 1–3.3)
LYMPHOCYTES # BLD AUTO: 13.7 % — SIGNIFICANT CHANGE UP (ref 13–44)
MCHC RBC-ENTMCNC: 29.7 PG — SIGNIFICANT CHANGE UP (ref 27–34)
MCHC RBC-ENTMCNC: 31.4 GM/DL — LOW (ref 32–36)
MCV RBC AUTO: 94.3 FL — SIGNIFICANT CHANGE UP (ref 80–100)
MONOCYTES # BLD AUTO: 1 K/UL — HIGH (ref 0–0.9)
MONOCYTES NFR BLD AUTO: 12.3 % — SIGNIFICANT CHANGE UP (ref 2–14)
NEUTROPHILS # BLD AUTO: 5.92 K/UL — SIGNIFICANT CHANGE UP (ref 1.8–7.4)
NEUTROPHILS NFR BLD AUTO: 72.7 % — SIGNIFICANT CHANGE UP (ref 43–77)
NRBC # BLD: 0 /100 WBCS — SIGNIFICANT CHANGE UP (ref 0–0)
PLATELET # BLD AUTO: 270 K/UL — SIGNIFICANT CHANGE UP (ref 150–400)
POTASSIUM SERPL-MCNC: 3.2 MMOL/L — LOW (ref 3.5–5.3)
POTASSIUM SERPL-SCNC: 3.2 MMOL/L — LOW (ref 3.5–5.3)
RBC # BLD: 3.17 M/UL — LOW (ref 4.2–5.8)
RBC # FLD: 15.6 % — HIGH (ref 10.3–14.5)
SODIUM SERPL-SCNC: 144 MMOL/L — SIGNIFICANT CHANGE UP (ref 135–145)
WBC # BLD: 8.15 K/UL — SIGNIFICANT CHANGE UP (ref 3.8–10.5)
WBC # FLD AUTO: 8.15 K/UL — SIGNIFICANT CHANGE UP (ref 3.8–10.5)

## 2023-03-18 PROCEDURE — 99233 SBSQ HOSP IP/OBS HIGH 50: CPT

## 2023-03-18 PROCEDURE — 99232 SBSQ HOSP IP/OBS MODERATE 35: CPT

## 2023-03-18 RX ORDER — POLYETHYLENE GLYCOL 3350 17 G/17G
17 POWDER, FOR SOLUTION ORAL DAILY
Refills: 0 | Status: DISCONTINUED | OUTPATIENT
Start: 2023-03-18 | End: 2023-03-22

## 2023-03-18 RX ORDER — HYDROCORTISONE 1 %
1 OINTMENT (GRAM) TOPICAL AT BEDTIME
Refills: 0 | Status: DISCONTINUED | OUTPATIENT
Start: 2023-03-18 | End: 2023-03-22

## 2023-03-18 RX ORDER — POTASSIUM CHLORIDE 20 MEQ
40 PACKET (EA) ORAL EVERY 4 HOURS
Refills: 0 | Status: COMPLETED | OUTPATIENT
Start: 2023-03-18 | End: 2023-03-18

## 2023-03-18 RX ORDER — SENNA PLUS 8.6 MG/1
2 TABLET ORAL AT BEDTIME
Refills: 0 | Status: DISCONTINUED | OUTPATIENT
Start: 2023-03-18 | End: 2023-03-22

## 2023-03-18 RX ADMIN — SIMETHICONE 80 MILLIGRAM(S): 80 TABLET, CHEWABLE ORAL at 05:56

## 2023-03-18 RX ADMIN — PANTOPRAZOLE SODIUM 40 MILLIGRAM(S): 20 TABLET, DELAYED RELEASE ORAL at 18:03

## 2023-03-18 RX ADMIN — ATORVASTATIN CALCIUM 20 MILLIGRAM(S): 80 TABLET, FILM COATED ORAL at 21:50

## 2023-03-18 RX ADMIN — Medication 250 MILLIGRAM(S): at 18:04

## 2023-03-18 RX ADMIN — Medication 400 UNIT(S): at 11:51

## 2023-03-18 RX ADMIN — SIMETHICONE 80 MILLIGRAM(S): 80 TABLET, CHEWABLE ORAL at 23:30

## 2023-03-18 RX ADMIN — CEFEPIME 100 MILLIGRAM(S): 1 INJECTION, POWDER, FOR SOLUTION INTRAMUSCULAR; INTRAVENOUS at 21:47

## 2023-03-18 RX ADMIN — Medication 40 MILLIEQUIVALENT(S): at 13:36

## 2023-03-18 RX ADMIN — PANTOPRAZOLE SODIUM 40 MILLIGRAM(S): 20 TABLET, DELAYED RELEASE ORAL at 05:57

## 2023-03-18 RX ADMIN — SENNA PLUS 2 TABLET(S): 8.6 TABLET ORAL at 21:50

## 2023-03-18 RX ADMIN — Medication 1 GRAM(S): at 05:57

## 2023-03-18 RX ADMIN — Medication 500 MILLIGRAM(S): at 11:53

## 2023-03-18 RX ADMIN — Medication 1 TABLET(S): at 11:52

## 2023-03-18 RX ADMIN — SIMETHICONE 80 MILLIGRAM(S): 80 TABLET, CHEWABLE ORAL at 11:51

## 2023-03-18 RX ADMIN — BUDESONIDE AND FORMOTEROL FUMARATE DIHYDRATE 2 PUFF(S): 160; 4.5 AEROSOL RESPIRATORY (INHALATION) at 18:07

## 2023-03-18 RX ADMIN — Medication 1 GRAM(S): at 23:30

## 2023-03-18 RX ADMIN — Medication 20 MILLIGRAM(S): at 05:57

## 2023-03-18 RX ADMIN — SIMETHICONE 80 MILLIGRAM(S): 80 TABLET, CHEWABLE ORAL at 01:01

## 2023-03-18 RX ADMIN — Medication 1 GRAM(S): at 01:01

## 2023-03-18 RX ADMIN — CEFEPIME 100 MILLIGRAM(S): 1 INJECTION, POWDER, FOR SOLUTION INTRAMUSCULAR; INTRAVENOUS at 05:57

## 2023-03-18 RX ADMIN — Medication 2: at 12:08

## 2023-03-18 RX ADMIN — Medication 50 MILLIGRAM(S): at 05:57

## 2023-03-18 RX ADMIN — Medication 40 MILLIEQUIVALENT(S): at 11:51

## 2023-03-18 RX ADMIN — INSULIN GLARGINE 15 UNIT(S): 100 INJECTION, SOLUTION SUBCUTANEOUS at 21:51

## 2023-03-18 RX ADMIN — Medication 1 GRAM(S): at 11:53

## 2023-03-18 RX ADMIN — TAMSULOSIN HYDROCHLORIDE 0.4 MILLIGRAM(S): 0.4 CAPSULE ORAL at 21:50

## 2023-03-18 RX ADMIN — PREGABALIN 1000 MICROGRAM(S): 225 CAPSULE ORAL at 11:58

## 2023-03-18 RX ADMIN — Medication 250 MILLIGRAM(S): at 05:57

## 2023-03-18 RX ADMIN — TIOTROPIUM BROMIDE 2 PUFF(S): 18 CAPSULE ORAL; RESPIRATORY (INHALATION) at 05:56

## 2023-03-18 RX ADMIN — CEFEPIME 100 MILLIGRAM(S): 1 INJECTION, POWDER, FOR SOLUTION INTRAMUSCULAR; INTRAVENOUS at 13:35

## 2023-03-18 RX ADMIN — BUDESONIDE AND FORMOTEROL FUMARATE DIHYDRATE 2 PUFF(S): 160; 4.5 AEROSOL RESPIRATORY (INHALATION) at 05:56

## 2023-03-18 RX ADMIN — SIMETHICONE 80 MILLIGRAM(S): 80 TABLET, CHEWABLE ORAL at 18:02

## 2023-03-18 RX ADMIN — Medication 50 MILLIGRAM(S): at 18:02

## 2023-03-18 RX ADMIN — MONTELUKAST 10 MILLIGRAM(S): 4 TABLET, CHEWABLE ORAL at 11:52

## 2023-03-18 RX ADMIN — Medication 1 GRAM(S): at 18:02

## 2023-03-18 NOTE — PROGRESS NOTE ADULT - ASSESSMENT
anemia  GIB    s/p egd 3/13 showing gastric ulcer; gastritis; gastric ulcer; hiatal hernia  s/p colonoscopy with bx and clip 3/17  cecal polyp  cecal avm     plan  check cbc  if rebleed capsule  f/u bx  hemorrhoidal treatment.  Keep Hgb >8  Will follow    I reviewed the overnight course of events on the unit, re-confirming the patient history. I discussed the care with the patient and their family  Differential diagnosis and plan of care discussed with patient after the evaluation  50 minutes spent on total encounter of which more than fifty percent of the encounter was spent counseling and/or coordinating care by the attending physician.  Advanced care planning was discussed with patient and family.  Advanced care planning forms were reviewed and discussed.  Risks, benefits and alternatives of gastroenterologic procedures were discussed in detail and all questions were answered.

## 2023-03-18 NOTE — PROGRESS NOTE ADULT - SUBJECTIVE AND OBJECTIVE BOX
Cellulitis of left lower extremity    HPI:  82 yr male with history of Hypertension, COPD home oxygen dependent, CAD CABG , chronic left femor osteomyelitis since traumatic left femoral fracture in 1966.   Patient follow with ID dr العلي for mgt of his chronic femoral osteomyelitis . Has abscess and drainage in November 2022. Present with one day of new yellowish  drainage from left thigh. No fever or chills or other constitutional symptoms.  (06 Mar 2023 20:35)    Interval History:  Patient was seen and examined at bedside around 10 am.  Feels better.   Denies abdominal pain, nausea or vomiting.  No BM since colonoscopy.   Denies chest pain, palpitations, shortness of breath, headache, dizziness or visual symptoms.    ROS:  As per interval history otherwise unremarkable.    PHYSICAL EXAM:  Vital Signs   T(C): 36.3 (18 Mar 2023 13:18), Max: 36.9 (17 Mar 2023 21:00)  T(F): 97.4 (18 Mar 2023 13:18), Max: 98.4 (17 Mar 2023 21:00)  HR: 83 (18 Mar 2023 13:18) (83 - 98)  BP: 110/68 (18 Mar 2023 13:18) (110/68 - 155/76)  RR: 17 (18 Mar 2023 13:18) (17 - 17)  SpO2: 95% (18 Mar 2023 13:18) (92% - 95%)  Parameters below as of 18 Mar 2023 13:18  Patient On (Oxygen Delivery Method): room air  General: Elderly male lying in bed comfortably. No acute distress  HEENT: EOMI. Clear conjunctivae. Moist mucus membrane  Neck: Supple.   Chest: CTA bilaterally. No wheezing, rales or rhonchi.   Heart: Normal S1 & S2. RRR.   Abdomen: Obese. Soft. Non-tender. + BS  Ext: 1+ pedal edema. No calf tenderness   Neuro: Active and alert. No focal deficit. No speech disorder  Skin: Warm and Dry  Psychiatry: Normal mood and affect    I&O's Summary    17 Mar 2023 07:01  -  18 Mar 2023 07:00  --------------------------------------------------------  IN: 0 mL / OUT: 200 mL / NET: -200 mL    LABS:  CAPILLARY BLOOD GLUCOSE  POCT Blood Glucose.: 227 mg/dL (18 Mar 2023 12:05)  POCT Blood Glucose.: 131 mg/dL (18 Mar 2023 07:46)  POCT Blood Glucose.: 182 mg/dL (17 Mar 2023 21:06)  POCT Blood Glucose.: 118 mg/dL (17 Mar 2023 16:52)                      9.4    8.15  )-----------( 270      ( 18 Mar 2023 07:05 )             29.9     03-18    144  |  107  |  10  ----------------------------<  134<H>  3.2<L>   |  34<H>  |  1.10    Ca    9.1      18 Mar 2023 07:05  Phos  2.5     03-17  Mg     1.6     03-17    RADIOLOGY & ADDITIONAL STUDIES:  Reviewed     MEDICATIONS  (STANDING):  ascorbic acid 500 milliGRAM(s) Oral daily  atorvastatin 20 milliGRAM(s) Oral at bedtime  budesonide 160 MICROgram(s)/formoterol 4.5 MICROgram(s) Inhaler 2 Puff(s) Inhalation two times a day  cefepime   IVPB 2000 milliGRAM(s) IV Intermittent every 8 hours  cholecalciferol 400 Unit(s) Oral daily  cyanocobalamin 1000 MICROGram(s) Oral daily  dextrose 5%. 1000 milliLiter(s) (50 mL/Hr) IV Continuous <Continuous>  dextrose 5%. 1000 milliLiter(s) (100 mL/Hr) IV Continuous <Continuous>  dextrose 50% Injectable 25 Gram(s) IV Push once  dextrose 50% Injectable 12.5 Gram(s) IV Push once  dextrose 50% Injectable 25 Gram(s) IV Push once  furosemide    Tablet 20 milliGRAM(s) Oral daily  glucagon  Injectable 1 milliGRAM(s) IntraMuscular once  insulin glargine Injectable (LANTUS) 15 Unit(s) SubCutaneous at bedtime  insulin lispro (ADMELOG) corrective regimen sliding scale   SubCutaneous three times a day before meals  insulin lispro (ADMELOG) corrective regimen sliding scale   SubCutaneous at bedtime  mepolizumab Subcutaneous Injectable 100 milliGRAM(s) SubCutaneous every 4 weeks  metoprolol tartrate 50 milliGRAM(s) Oral two times a day  montelukast 10 milliGRAM(s) Oral daily  multivitamin 1 Tablet(s) Oral daily  Nephro-carlos 1 Tablet(s) Oral daily  pantoprazole  Injectable 40 milliGRAM(s) IV Push every 12 hours  saccharomyces boulardii 250 milliGRAM(s) Oral two times a day  simethicone 80 milliGRAM(s) Chew every 6 hours  sucralfate 1 Gram(s) Oral four times a day  tamsulosin 0.4 milliGRAM(s) Oral at bedtime  tiotropium 2.5 MICROgram(s) Inhaler 2 Puff(s) Inhalation daily    MEDICATIONS  (PRN):  albuterol/ipratropium for Nebulization 3 milliLiter(s) Nebulizer every 6 hours PRN Shortness of Breath and/or Wheezing  dextrose Oral Gel 15 Gram(s) Oral once PRN Blood Glucose LESS THAN 70 milliGRAM(s)/deciliter

## 2023-03-18 NOTE — PROGRESS NOTE ADULT - SUBJECTIVE AND OBJECTIVE BOX
[facility]    Génesis Villavicencio, [place], Kumar Hodges      [phone]    CHIEF COMPLAINT: Patient is a 82y old  Male who presents with a chief complaint of Left  thigh draining sinus (18 Mar 2023 05:42)      Follow Up: copd, cabg, thigh infection    Interim history: The patient reports no new symptoms.  Denies chest discomfort and shortness of breath.  No abdominal pain.  No new neurologic symptoms.  s/p egd, s/p bx and clip      MEDICATIONS  (STANDING):  ascorbic acid 500 milliGRAM(s) Oral daily  atorvastatin 20 milliGRAM(s) Oral at bedtime  budesonide 160 MICROgram(s)/formoterol 4.5 MICROgram(s) Inhaler 2 Puff(s) Inhalation two times a day  cefepime   IVPB 2000 milliGRAM(s) IV Intermittent every 8 hours  cholecalciferol 400 Unit(s) Oral daily  cyanocobalamin 1000 MICROGram(s) Oral daily  dextrose 5%. 1000 milliLiter(s) (50 mL/Hr) IV Continuous <Continuous>  dextrose 5%. 1000 milliLiter(s) (100 mL/Hr) IV Continuous <Continuous>  dextrose 50% Injectable 25 Gram(s) IV Push once  dextrose 50% Injectable 12.5 Gram(s) IV Push once  dextrose 50% Injectable 25 Gram(s) IV Push once  furosemide    Tablet 20 milliGRAM(s) Oral daily  glucagon  Injectable 1 milliGRAM(s) IntraMuscular once  insulin glargine Injectable (LANTUS) 15 Unit(s) SubCutaneous at bedtime  insulin lispro (ADMELOG) corrective regimen sliding scale   SubCutaneous three times a day before meals  insulin lispro (ADMELOG) corrective regimen sliding scale   SubCutaneous at bedtime  mepolizumab Subcutaneous Injectable 100 milliGRAM(s) SubCutaneous every 4 weeks  metoprolol tartrate 50 milliGRAM(s) Oral two times a day  montelukast 10 milliGRAM(s) Oral daily  multivitamin 1 Tablet(s) Oral daily  Nephro-carlos 1 Tablet(s) Oral daily  pantoprazole  Injectable 40 milliGRAM(s) IV Push every 12 hours  potassium chloride    Tablet ER 40 milliEquivalent(s) Oral every 4 hours  saccharomyces boulardii 250 milliGRAM(s) Oral two times a day  simethicone 80 milliGRAM(s) Chew every 6 hours  sucralfate 1 Gram(s) Oral four times a day  tamsulosin 0.4 milliGRAM(s) Oral at bedtime  tiotropium 2.5 MICROgram(s) Inhaler 2 Puff(s) Inhalation daily    MEDICATIONS  (PRN):  albuterol/ipratropium for Nebulization 3 milliLiter(s) Nebulizer every 6 hours PRN Shortness of Breath and/or Wheezing  dextrose Oral Gel 15 Gram(s) Oral once PRN Blood Glucose LESS THAN 70 milliGRAM(s)/deciliter      REVIEW OF SYSTEMS:  eye, ent, GI, , allergic, dermatologic, musculoskeletal and neurologic are negative except as described above    Vital Signs Last 24 Hrs  T(C): 36.9 (18 Mar 2023 05:21), Max: 37 (17 Mar 2023 12:47)  T(F): 98.4 (18 Mar 2023 05:21), Max: 98.6 (17 Mar 2023 12:47)  HR: 98 (18 Mar 2023 05:21) (71 - 98)  BP: 113/71 (18 Mar 2023 05:21) (99/47 - 155/76)  BP(mean): --  RR: 17 (18 Mar 2023 05:21) (14 - 29)  SpO2: 93% (18 Mar 2023 05:21) (92% - 100%)    Parameters below as of 18 Mar 2023 08:50  Patient On (Oxygen Delivery Method): BiPAP/CPAP,@        I&O's Summary    17 Mar 2023 07:01  -  18 Mar 2023 07:00  --------------------------------------------------------  IN: 0 mL / OUT: 200 mL / NET: -200 mL        Telemetry past 24h:    PHYSICAL EXAM:    Constitutional: well-nourished, well-developed, NAD   HEENT:  MMM, sclerae anicteric, conjunctivae clear, no oral cyanosis.  Pulmonary: Non-labored, breath sounds are clear bilaterally, No wheezing, rales or rhonchi  Cardiovascular: Regular, S1 and S2.  No murmur.  No rubs, gallops or clicks  Gastrointestinal: Bowel Sounds present, soft, nontender.   Lymph: mild peripheral edema.   Neurological: Alert, no focal deficits  Skin: No rashes.  Psych:  Mood & affect appropriate    LABS: All Labs Reviewed:                        9.4    8.15  )-----------( 270      ( 18 Mar 2023 07:05 )             29.9                         8.9    7.24  )-----------( 263      ( 17 Mar 2023 05:54 )             29.1                         7.4    8.30  )-----------( 250      ( 16 Mar 2023 05:25 )             24.8     18 Mar 2023 07:05    144    |  107    |  10     ----------------------------<  134    3.2     |  34     |  1.10   17 Mar 2023 05:54    145    |  109    |  10     ----------------------------<  116    3.0     |  32     |  0.98   16 Mar 2023 05:25    143    |  110    |  14     ----------------------------<  131    3.6     |  31     |  0.94     Ca    9.1        18 Mar 2023 07:05  Ca    9.1        17 Mar 2023 05:54  Ca    8.8        16 Mar 2023 05:25  Phos  2.5       17 Mar 2023 05:54  Phos  2.4       16 Mar 2023 05:25  Mg     1.6       17 Mar 2023 05:54  Mg     1.5       16 Mar 2023 05:25            Blood Culture:         RADIOLOGY:    EKG:    Echo:

## 2023-03-18 NOTE — PROGRESS NOTE ADULT - ASSESSMENT
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     OM  COURTNEY  Asthma  COPD  HTN  CAD  CABG hx    cecal polyp and avm on colonoscopy  vs noted  on cpap night time  cm follow up noted  on cefepime    NIV use night time and prn - 18/8 and 25 pct fio2  sleep hygiene reviewed  cvs rx regimen  BP control  pt is on Nucala in the office - Monthly with Dr Oscar Marcelino - [facility]  on Symbicort - Spiriva - Singulair - copd - asthma -   NEBS PRN  monitor VS and Sat  keep sat > 88 pct  spoke with pt - wife  outpatient records reviewed

## 2023-03-18 NOTE — PROGRESS NOTE ADULT - SUBJECTIVE AND OBJECTIVE BOX
[facility]  Rich Sky PA-C  [address] [place] [address]  [phone]      INTERVAL HPI/OVERNIGHT EVENTS:  Pt s/e wife at bedside  s/p colonoscopy   Currently reports no GI complaints    MEDICATIONS  (STANDING):  ascorbic acid 500 milliGRAM(s) Oral daily  atorvastatin 20 milliGRAM(s) Oral at bedtime  bisacodyl 10 milliGRAM(s) Oral every 4 hours  budesonide 160 MICROgram(s)/formoterol 4.5 MICROgram(s) Inhaler 2 Puff(s) Inhalation two times a day  cholecalciferol 400 Unit(s) Oral daily  cyanocobalamin 1000 MICROGram(s) Oral daily  dextrose 5%. 1000 milliLiter(s) (50 mL/Hr) IV Continuous <Continuous>  dextrose 5%. 1000 milliLiter(s) (100 mL/Hr) IV Continuous <Continuous>  dextrose 50% Injectable 25 Gram(s) IV Push once  dextrose 50% Injectable 12.5 Gram(s) IV Push once  dextrose 50% Injectable 25 Gram(s) IV Push once  furosemide    Tablet 20 milliGRAM(s) Oral daily  glucagon  Injectable 1 milliGRAM(s) IntraMuscular once  insulin glargine Injectable (LANTUS) 15 Unit(s) SubCutaneous at bedtime  insulin lispro (ADMELOG) corrective regimen sliding scale   SubCutaneous at bedtime  insulin lispro (ADMELOG) corrective regimen sliding scale   SubCutaneous three times a day before meals  mepolizumab Subcutaneous Injectable 100 milliGRAM(s) SubCutaneous every 4 weeks  metoprolol tartrate 50 milliGRAM(s) Oral two times a day  montelukast 10 milliGRAM(s) Oral daily  multivitamin 1 Tablet(s) Oral daily  Nephro-carlos 1 Tablet(s) Oral daily  pantoprazole  Injectable 40 milliGRAM(s) IV Push every 12 hours  polyethylene glycol/electrolyte Solution 1000 milliLiter(s) Oral every 4 hours  saccharomyces boulardii 250 milliGRAM(s) Oral two times a day  simethicone 80 milliGRAM(s) Chew every 6 hours  sucralfate 1 Gram(s) Oral four times a day  tamsulosin 0.4 milliGRAM(s) Oral at bedtime  tiotropium 2.5 MICROgram(s) Inhaler 2 Puff(s) Inhalation daily    MEDICATIONS  (PRN):  albuterol/ipratropium for Nebulization 3 milliLiter(s) Nebulizer every 6 hours PRN Shortness of Breath and/or Wheezing  dextrose Oral Gel 15 Gram(s) Oral once PRN Blood Glucose LESS THAN 70 milliGRAM(s)/deciliter      Allergies    [This allergen will not trigger allergy alert] IV DYE, IODINE CONTRAST (Unknown)  [This allergen will not trigger allergy alert] NKDA (Unknown)  latex (Unknown)    Intolerances    shellfish (Nausea)      PHYSICAL EXAM:   Vital Signs Last 24 Hrs  T(C): 36.9 (18 Mar 2023 05:21), Max: 36.9 (17 Mar 2023 21:00)  T(F): 98.4 (18 Mar 2023 05:21), Max: 98.4 (17 Mar 2023 21:00)  HR: 98 (18 Mar 2023 05:21) (71 - 98)  BP: 113/71 (18 Mar 2023 05:21) (99/47 - 155/76)  BP(mean): --  RR: 17 (18 Mar 2023 05:21) (14 - 29)  SpO2: 93% (18 Mar 2023 05:21) (92% - 100%)    Parameters below as of 18 Mar 2023 08:50  Patient On (Oxygen Delivery Method): BiPAP/CPAP,@HS      GENERAL:  Appears stated age  HEENT:  NC/AT  CHEST:  Full & symmetric excursion  HEART:  Regular rhythm  ABDOMEN:  Soft, non-tender, non-distended  EXTEREMITIES:  no cyanosis  SKIN:  No rash  NEURO:  Alert      LABS:                        9.4    8.15  )-----------( 270      ( 18 Mar 2023 07:05 )             29.9     03-18    144  |  107  |  10  ----------------------------<  134<H>  3.2<L>   |  34<H>  |  1.10    Ca    9.1      18 Mar 2023 07:05  Phos  2.5     03-17  Mg     1.6     03-17

## 2023-03-18 NOTE — PROGRESS NOTE ADULT - ASSESSMENT
82 yr male with history of Hypertension, COPD home oxygen dependent, CAD CABG , chronic left femor osteomyelitis since traumatic left femoral fracture in 1966 presents with new yellowish  drainage from left thigh. Cardiology consulted for clearance for GI scopy.     CAD s/p CABG  - EKG showed SR @ 93.  No evidence of any active ischemia.  No anginal complaints  - Continue BB and statin   - Please resume ASA when cleared by GI    - Previous TTE 12/21 showed overall normal systolic function with an estimated LVEF of 55-60%.   - Pt sees Dr. Rod for cardiology.   - No evidence of any meaningful volume overload laying flat.  No O2 requirement.  min to mild edema. Continue home Lasix 20 mg PO daily  - Given Lasix 40 mg IV x 1 today pre colonoscopy  - s/p EGD gastritis, gastric ulcer, hiatal hernia. s/p colon cecal polyp and avb, clipped.  Follow GI recs      - SCDs for DVT prophylaxis   - Monitor and replete lytes, keep K>4, Mg>2.  - Will continue to follow

## 2023-03-18 NOTE — PROGRESS NOTE ADULT - SUBJECTIVE AND OBJECTIVE BOX
Date/Time Patient Seen:  		  Referring MD:   Data Reviewed	       Patient is a 82y old  Male who presents with a chief complaint of Left  thigh draining sinus (17 Mar 2023 14:10)      Subjective/HPI     PAST MEDICAL & SURGICAL HISTORY:  Diabetes Mellitus, Type II    CAD (Coronary Artery Disease)  s/p 3v CABG 2004; stents placed in [place] in 2019    Dyslipidemia    Asymptomatic Peripheral Vascular Disease    Osteomyelitis    COPD (chronic obstructive pulmonary disease)  on 2L at home and BiPAP at night; intubated 6/18    Diabetes mellitus    Hypertension    PVD (peripheral vascular disease)    History of PAT (paroxysmal atrial tachycardia)    Asthma with COPD    BPH (benign prostatic hyperplasia)    Acute osteomyelitis    CABG (Coronary Artery Bypass Graft)  2004    Compound fracture  left leg    S/P primary angioplasty with coronary stent    H/O drainage of abscess  Left femur 12/2021          Medication list         MEDICATIONS  (STANDING):  ascorbic acid 500 milliGRAM(s) Oral daily  atorvastatin 20 milliGRAM(s) Oral at bedtime  budesonide 160 MICROgram(s)/formoterol 4.5 MICROgram(s) Inhaler 2 Puff(s) Inhalation two times a day  cefepime   IVPB 2000 milliGRAM(s) IV Intermittent every 8 hours  cholecalciferol 400 Unit(s) Oral daily  cyanocobalamin 1000 MICROGram(s) Oral daily  dextrose 5%. 1000 milliLiter(s) (50 mL/Hr) IV Continuous <Continuous>  dextrose 5%. 1000 milliLiter(s) (100 mL/Hr) IV Continuous <Continuous>  dextrose 50% Injectable 25 Gram(s) IV Push once  dextrose 50% Injectable 12.5 Gram(s) IV Push once  dextrose 50% Injectable 25 Gram(s) IV Push once  furosemide    Tablet 20 milliGRAM(s) Oral daily  glucagon  Injectable 1 milliGRAM(s) IntraMuscular once  insulin glargine Injectable (LANTUS) 15 Unit(s) SubCutaneous at bedtime  insulin lispro (ADMELOG) corrective regimen sliding scale   SubCutaneous three times a day before meals  insulin lispro (ADMELOG) corrective regimen sliding scale   SubCutaneous at bedtime  mepolizumab Subcutaneous Injectable 100 milliGRAM(s) SubCutaneous every 4 weeks  metoprolol tartrate 50 milliGRAM(s) Oral two times a day  montelukast 10 milliGRAM(s) Oral daily  multivitamin 1 Tablet(s) Oral daily  Nephro-carlos 1 Tablet(s) Oral daily  pantoprazole  Injectable 40 milliGRAM(s) IV Push every 12 hours  saccharomyces boulardii 250 milliGRAM(s) Oral two times a day  simethicone 80 milliGRAM(s) Chew every 6 hours  sucralfate 1 Gram(s) Oral four times a day  tamsulosin 0.4 milliGRAM(s) Oral at bedtime  tiotropium 2.5 MICROgram(s) Inhaler 2 Puff(s) Inhalation daily    MEDICATIONS  (PRN):  albuterol/ipratropium for Nebulization 3 milliLiter(s) Nebulizer every 6 hours PRN Shortness of Breath and/or Wheezing  dextrose Oral Gel 15 Gram(s) Oral once PRN Blood Glucose LESS THAN 70 milliGRAM(s)/deciliter         Vitals log        ICU Vital Signs Last 24 Hrs  T(C): 36.9 (18 Mar 2023 05:21), Max: 37 (17 Mar 2023 12:47)  T(F): 98.4 (18 Mar 2023 05:21), Max: 98.6 (17 Mar 2023 12:47)  HR: 98 (18 Mar 2023 05:21) (71 - 98)  BP: 113/71 (18 Mar 2023 05:21) (99/47 - 155/76)  BP(mean): --  ABP: --  ABP(mean): --  RR: 17 (18 Mar 2023 05:21) (14 - 29)  SpO2: 93% (18 Mar 2023 05:21) (92% - 100%)    O2 Parameters below as of 18 Mar 2023 05:21  Patient On (Oxygen Delivery Method): BiPAP/CPAP                 Input and Output:  I&O's Detail    17 Mar 2023 07:01  -  18 Mar 2023 05:42  --------------------------------------------------------  IN:  Total IN: 0 mL    OUT:    Voided (mL): 200 mL  Total OUT: 200 mL    Total NET: -200 mL          Lab Data                        8.9    7.24  )-----------( 263      ( 17 Mar 2023 05:54 )             29.1     03-17    145  |  109<H>  |  10  ----------------------------<  116<H>  3.0<L>   |  32<H>  |  0.98    Ca    9.1      17 Mar 2023 05:54  Phos  2.5     03-17  Mg     1.6     03-17              Review of Systems	      Objective     Physical Examination    heart s1s2  lung dec BS  head nc      Pertinent Lab findings & Imaging      Eliecer:  NO   Adequate UO     I&O's Detail    17 Mar 2023 07:01  -  18 Mar 2023 05:42  --------------------------------------------------------  IN:  Total IN: 0 mL    OUT:    Voided (mL): 200 mL  Total OUT: 200 mL    Total NET: -200 mL               Discussed with:     Cultures:	        Radiology

## 2023-03-19 LAB
ALBUMIN SERPL ELPH-MCNC: 2.5 G/DL — LOW (ref 3.3–5)
ALBUMIN SERPL ELPH-MCNC: 3.1 G/DL — LOW (ref 3.3–5)
ALP SERPL-CCNC: 101 U/L — SIGNIFICANT CHANGE UP (ref 40–120)
ALP SERPL-CCNC: 76 U/L — SIGNIFICANT CHANGE UP (ref 40–120)
ALT FLD-CCNC: 24 U/L — SIGNIFICANT CHANGE UP (ref 12–78)
ALT FLD-CCNC: 29 U/L — SIGNIFICANT CHANGE UP (ref 12–78)
ANION GAP SERPL CALC-SCNC: 3 MMOL/L — LOW (ref 5–17)
ANION GAP SERPL CALC-SCNC: 6 MMOL/L — SIGNIFICANT CHANGE UP (ref 5–17)
AST SERPL-CCNC: 18 U/L — SIGNIFICANT CHANGE UP (ref 15–37)
AST SERPL-CCNC: 27 U/L — SIGNIFICANT CHANGE UP (ref 15–37)
BASOPHILS # BLD AUTO: 0.02 K/UL — SIGNIFICANT CHANGE UP (ref 0–0.2)
BASOPHILS NFR BLD AUTO: 0.3 % — SIGNIFICANT CHANGE UP (ref 0–2)
BILIRUB SERPL-MCNC: 0.3 MG/DL — SIGNIFICANT CHANGE UP (ref 0.2–1.2)
BILIRUB SERPL-MCNC: 0.3 MG/DL — SIGNIFICANT CHANGE UP (ref 0.2–1.2)
BUN SERPL-MCNC: 16 MG/DL — SIGNIFICANT CHANGE UP (ref 7–23)
BUN SERPL-MCNC: 17 MG/DL — SIGNIFICANT CHANGE UP (ref 7–23)
CALCIUM SERPL-MCNC: 8.9 MG/DL — SIGNIFICANT CHANGE UP (ref 8.5–10.1)
CALCIUM SERPL-MCNC: 9.3 MG/DL — SIGNIFICANT CHANGE UP (ref 8.5–10.1)
CHLORIDE SERPL-SCNC: 102 MMOL/L — SIGNIFICANT CHANGE UP (ref 96–108)
CHLORIDE SERPL-SCNC: 107 MMOL/L — SIGNIFICANT CHANGE UP (ref 96–108)
CO2 SERPL-SCNC: 32 MMOL/L — HIGH (ref 22–31)
CO2 SERPL-SCNC: 33 MMOL/L — HIGH (ref 22–31)
CREAT SERPL-MCNC: 1.1 MG/DL — SIGNIFICANT CHANGE UP (ref 0.5–1.3)
CREAT SERPL-MCNC: 1.3 MG/DL — SIGNIFICANT CHANGE UP (ref 0.5–1.3)
EGFR: 55 ML/MIN/1.73M2 — LOW
EGFR: 67 ML/MIN/1.73M2 — SIGNIFICANT CHANGE UP
EOSINOPHIL # BLD AUTO: 0.04 K/UL — SIGNIFICANT CHANGE UP (ref 0–0.5)
EOSINOPHIL NFR BLD AUTO: 0.5 % — SIGNIFICANT CHANGE UP (ref 0–6)
GLUCOSE SERPL-MCNC: 143 MG/DL — HIGH (ref 70–99)
GLUCOSE SERPL-MCNC: 164 MG/DL — HIGH (ref 70–99)
HCT VFR BLD CALC: 30.4 % — LOW (ref 39–50)
HCT VFR BLD CALC: 36.1 % — LOW (ref 39–50)
HGB BLD-MCNC: 10.7 G/DL — LOW (ref 13–17)
HGB BLD-MCNC: 9.3 G/DL — LOW (ref 13–17)
IMM GRANULOCYTES NFR BLD AUTO: 0.4 % — SIGNIFICANT CHANGE UP (ref 0–0.9)
LYMPHOCYTES # BLD AUTO: 0.92 K/UL — LOW (ref 1–3.3)
LYMPHOCYTES # BLD AUTO: 12.4 % — LOW (ref 13–44)
MAGNESIUM SERPL-MCNC: 1.4 MG/DL — LOW (ref 1.6–2.6)
MAGNESIUM SERPL-MCNC: 1.5 MG/DL — LOW (ref 1.6–2.6)
MCHC RBC-ENTMCNC: 28.2 PG — SIGNIFICANT CHANGE UP (ref 27–34)
MCHC RBC-ENTMCNC: 28.9 PG — SIGNIFICANT CHANGE UP (ref 27–34)
MCHC RBC-ENTMCNC: 29.6 GM/DL — LOW (ref 32–36)
MCHC RBC-ENTMCNC: 30.6 GM/DL — LOW (ref 32–36)
MCV RBC AUTO: 94.4 FL — SIGNIFICANT CHANGE UP (ref 80–100)
MCV RBC AUTO: 95 FL — SIGNIFICANT CHANGE UP (ref 80–100)
MONOCYTES # BLD AUTO: 0.85 K/UL — SIGNIFICANT CHANGE UP (ref 0–0.9)
MONOCYTES NFR BLD AUTO: 11.5 % — SIGNIFICANT CHANGE UP (ref 2–14)
NEUTROPHILS # BLD AUTO: 5.56 K/UL — SIGNIFICANT CHANGE UP (ref 1.8–7.4)
NEUTROPHILS NFR BLD AUTO: 74.9 % — SIGNIFICANT CHANGE UP (ref 43–77)
NRBC # BLD: 0 /100 WBCS — SIGNIFICANT CHANGE UP (ref 0–0)
NRBC # BLD: 0 /100 WBCS — SIGNIFICANT CHANGE UP (ref 0–0)
PHOSPHATE SERPL-MCNC: 3.4 MG/DL — SIGNIFICANT CHANGE UP (ref 2.5–4.5)
PLATELET # BLD AUTO: 283 K/UL — SIGNIFICANT CHANGE UP (ref 150–400)
PLATELET # BLD AUTO: 325 K/UL — SIGNIFICANT CHANGE UP (ref 150–400)
POTASSIUM SERPL-MCNC: 3.5 MMOL/L — SIGNIFICANT CHANGE UP (ref 3.5–5.3)
POTASSIUM SERPL-MCNC: 3.5 MMOL/L — SIGNIFICANT CHANGE UP (ref 3.5–5.3)
POTASSIUM SERPL-SCNC: 3.5 MMOL/L — SIGNIFICANT CHANGE UP (ref 3.5–5.3)
POTASSIUM SERPL-SCNC: 3.5 MMOL/L — SIGNIFICANT CHANGE UP (ref 3.5–5.3)
PROT SERPL-MCNC: 5.4 G/DL — LOW (ref 6–8.3)
PROT SERPL-MCNC: 6.8 G/DL — SIGNIFICANT CHANGE UP (ref 6–8.3)
RBC # BLD: 3.22 M/UL — LOW (ref 4.2–5.8)
RBC # BLD: 3.8 M/UL — LOW (ref 4.2–5.8)
RBC # FLD: 14.8 % — HIGH (ref 10.3–14.5)
RBC # FLD: 15.1 % — HIGH (ref 10.3–14.5)
SODIUM SERPL-SCNC: 141 MMOL/L — SIGNIFICANT CHANGE UP (ref 135–145)
SODIUM SERPL-SCNC: 142 MMOL/L — SIGNIFICANT CHANGE UP (ref 135–145)
TROPONIN I, HIGH SENSITIVITY RESULT: 15.6 NG/L — SIGNIFICANT CHANGE UP
WBC # BLD: 15.73 K/UL — HIGH (ref 3.8–10.5)
WBC # BLD: 7.42 K/UL — SIGNIFICANT CHANGE UP (ref 3.8–10.5)
WBC # FLD AUTO: 15.73 K/UL — HIGH (ref 3.8–10.5)
WBC # FLD AUTO: 7.42 K/UL — SIGNIFICANT CHANGE UP (ref 3.8–10.5)

## 2023-03-19 PROCEDURE — 99232 SBSQ HOSP IP/OBS MODERATE 35: CPT

## 2023-03-19 PROCEDURE — 71045 X-RAY EXAM CHEST 1 VIEW: CPT | Mod: 26

## 2023-03-19 PROCEDURE — 99233 SBSQ HOSP IP/OBS HIGH 50: CPT

## 2023-03-19 RX ORDER — PANTOPRAZOLE SODIUM 20 MG/1
40 TABLET, DELAYED RELEASE ORAL EVERY 12 HOURS
Refills: 0 | Status: DISCONTINUED | OUTPATIENT
Start: 2023-03-19 | End: 2023-03-22

## 2023-03-19 RX ORDER — CHLORHEXIDINE GLUCONATE 213 G/1000ML
1 SOLUTION TOPICAL
Refills: 0 | Status: DISCONTINUED | OUTPATIENT
Start: 2023-03-19 | End: 2023-03-22

## 2023-03-19 RX ORDER — MAGNESIUM OXIDE 400 MG ORAL TABLET 241.3 MG
400 TABLET ORAL
Refills: 0 | Status: COMPLETED | OUTPATIENT
Start: 2023-03-19 | End: 2023-03-22

## 2023-03-19 RX ORDER — LEVALBUTEROL 1.25 MG/.5ML
0.63 SOLUTION, CONCENTRATE RESPIRATORY (INHALATION) ONCE
Refills: 0 | Status: COMPLETED | OUTPATIENT
Start: 2023-03-19 | End: 2023-03-19

## 2023-03-19 RX ORDER — POTASSIUM CHLORIDE 20 MEQ
40 PACKET (EA) ORAL ONCE
Refills: 0 | Status: COMPLETED | OUTPATIENT
Start: 2023-03-19 | End: 2023-03-19

## 2023-03-19 RX ORDER — INSULIN GLARGINE 100 [IU]/ML
18 INJECTION, SOLUTION SUBCUTANEOUS AT BEDTIME
Refills: 0 | Status: DISCONTINUED | OUTPATIENT
Start: 2023-03-19 | End: 2023-03-22

## 2023-03-19 RX ORDER — SODIUM CHLORIDE 9 MG/ML
4 INJECTION INTRAMUSCULAR; INTRAVENOUS; SUBCUTANEOUS EVERY 6 HOURS
Refills: 0 | Status: COMPLETED | OUTPATIENT
Start: 2023-03-19 | End: 2023-03-20

## 2023-03-19 RX ORDER — ACETYLCYSTEINE 200 MG/ML
4 VIAL (ML) MISCELLANEOUS ONCE
Refills: 0 | Status: COMPLETED | OUTPATIENT
Start: 2023-03-19 | End: 2023-03-19

## 2023-03-19 RX ORDER — NITROGLYCERIN 6.5 MG
25 CAPSULE, EXTENDED RELEASE ORAL
Qty: 50 | Refills: 0 | Status: DISCONTINUED | OUTPATIENT
Start: 2023-03-19 | End: 2023-03-19

## 2023-03-19 RX ORDER — FUROSEMIDE 40 MG
40 TABLET ORAL
Refills: 0 | Status: DISCONTINUED | OUTPATIENT
Start: 2023-03-19 | End: 2023-03-20

## 2023-03-19 RX ORDER — FUROSEMIDE 40 MG
80 TABLET ORAL ONCE
Refills: 0 | Status: COMPLETED | OUTPATIENT
Start: 2023-03-19 | End: 2023-03-19

## 2023-03-19 RX ADMIN — Medication 400 UNIT(S): at 11:29

## 2023-03-19 RX ADMIN — BUDESONIDE AND FORMOTEROL FUMARATE DIHYDRATE 2 PUFF(S): 160; 4.5 AEROSOL RESPIRATORY (INHALATION) at 05:34

## 2023-03-19 RX ADMIN — Medication 1 TABLET(S): at 11:26

## 2023-03-19 RX ADMIN — CEFEPIME 100 MILLIGRAM(S): 1 INJECTION, POWDER, FOR SOLUTION INTRAMUSCULAR; INTRAVENOUS at 05:34

## 2023-03-19 RX ADMIN — MONTELUKAST 10 MILLIGRAM(S): 4 TABLET, CHEWABLE ORAL at 11:28

## 2023-03-19 RX ADMIN — Medication 40 MILLIGRAM(S): at 21:51

## 2023-03-19 RX ADMIN — MAGNESIUM OXIDE 400 MG ORAL TABLET 400 MILLIGRAM(S): 241.3 TABLET ORAL at 16:00

## 2023-03-19 RX ADMIN — PREGABALIN 1000 MICROGRAM(S): 225 CAPSULE ORAL at 11:28

## 2023-03-19 RX ADMIN — INSULIN GLARGINE 18 UNIT(S): 100 INJECTION, SOLUTION SUBCUTANEOUS at 21:52

## 2023-03-19 RX ADMIN — Medication 125 MILLIGRAM(S): at 19:43

## 2023-03-19 RX ADMIN — Medication 250 MILLIGRAM(S): at 05:34

## 2023-03-19 RX ADMIN — Medication 2: at 12:09

## 2023-03-19 RX ADMIN — CEFEPIME 100 MILLIGRAM(S): 1 INJECTION, POWDER, FOR SOLUTION INTRAMUSCULAR; INTRAVENOUS at 21:51

## 2023-03-19 RX ADMIN — Medication 80 MILLIGRAM(S): at 18:00

## 2023-03-19 RX ADMIN — Medication 50 MILLIGRAM(S): at 05:32

## 2023-03-19 RX ADMIN — TIOTROPIUM BROMIDE 2 PUFF(S): 18 CAPSULE ORAL; RESPIRATORY (INHALATION) at 05:35

## 2023-03-19 RX ADMIN — SIMETHICONE 80 MILLIGRAM(S): 80 TABLET, CHEWABLE ORAL at 11:26

## 2023-03-19 RX ADMIN — Medication 1: at 17:19

## 2023-03-19 RX ADMIN — PANTOPRAZOLE SODIUM 40 MILLIGRAM(S): 20 TABLET, DELAYED RELEASE ORAL at 05:32

## 2023-03-19 RX ADMIN — Medication 1 GRAM(S): at 05:32

## 2023-03-19 RX ADMIN — Medication 1 TABLET(S): at 11:27

## 2023-03-19 RX ADMIN — SODIUM CHLORIDE 4 MILLILITER(S): 9 INJECTION INTRAMUSCULAR; INTRAVENOUS; SUBCUTANEOUS at 21:56

## 2023-03-19 RX ADMIN — Medication 20 MILLIGRAM(S): at 05:32

## 2023-03-19 RX ADMIN — CEFEPIME 100 MILLIGRAM(S): 1 INJECTION, POWDER, FOR SOLUTION INTRAMUSCULAR; INTRAVENOUS at 14:17

## 2023-03-19 RX ADMIN — POLYETHYLENE GLYCOL 3350 17 GRAM(S): 17 POWDER, FOR SOLUTION ORAL at 11:27

## 2023-03-19 RX ADMIN — SIMETHICONE 80 MILLIGRAM(S): 80 TABLET, CHEWABLE ORAL at 05:33

## 2023-03-19 RX ADMIN — Medication 1 GRAM(S): at 11:26

## 2023-03-19 RX ADMIN — Medication 500 MILLIGRAM(S): at 11:25

## 2023-03-19 RX ADMIN — LEVALBUTEROL 0.63 MILLIGRAM(S): 1.25 SOLUTION, CONCENTRATE RESPIRATORY (INHALATION) at 21:56

## 2023-03-19 RX ADMIN — Medication 4 MILLILITER(S): at 21:56

## 2023-03-19 NOTE — PROGRESS NOTE ADULT - ASSESSMENT
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     OM  COURTNEY  Asthma  COPD  HTN  CAD  CABG hx    cecal polyp and avm on colonoscopy  vs noted  on cpap night time  cm follow up noted  on cefepime    NIV use night time and prn - 18/8 and 25 pct fio2  sleep hygiene reviewed  cvs rx regimen  BP control  pt is on Nucala in the office - Monthly with Dr Oscar Marcelino - [facility]  on Symbicort - Spiriva - Singulair - copd - asthma -   NEBS PRN  monitor VS and Sat  keep sat > 88 pct  spoke with pt - wife  outpatient records reviewed

## 2023-03-19 NOTE — PROGRESS NOTE ADULT - SUBJECTIVE AND OBJECTIVE BOX
Date/Time Patient Seen:  		  Referring MD:   Data Reviewed	       Patient is a 82y old  Male who presents with a chief complaint of Left  thigh draining sinus (18 Mar 2023 15:30)      Subjective/HPI     PAST MEDICAL & SURGICAL HISTORY:  Diabetes Mellitus, Type II    CAD (Coronary Artery Disease)  s/p 3v CABG 2004; stents placed in [place] in 2019    Dyslipidemia    Asymptomatic Peripheral Vascular Disease    Osteomyelitis    COPD (chronic obstructive pulmonary disease)  on 2L at home and BiPAP at night; intubated 6/18    Diabetes mellitus    Hypertension    PVD (peripheral vascular disease)    History of PAT (paroxysmal atrial tachycardia)    Asthma with COPD    BPH (benign prostatic hyperplasia)    Acute osteomyelitis    CABG (Coronary Artery Bypass Graft)  2004    Compound fracture  left leg    S/P primary angioplasty with coronary stent    H/O drainage of abscess  Left femur 12/2021          Medication list         MEDICATIONS  (STANDING):  ascorbic acid 500 milliGRAM(s) Oral daily  atorvastatin 20 milliGRAM(s) Oral at bedtime  budesonide 160 MICROgram(s)/formoterol 4.5 MICROgram(s) Inhaler 2 Puff(s) Inhalation two times a day  cefepime   IVPB 2000 milliGRAM(s) IV Intermittent every 8 hours  cholecalciferol 400 Unit(s) Oral daily  cyanocobalamin 1000 MICROGram(s) Oral daily  dextrose 5%. 1000 milliLiter(s) (100 mL/Hr) IV Continuous <Continuous>  dextrose 5%. 1000 milliLiter(s) (50 mL/Hr) IV Continuous <Continuous>  dextrose 50% Injectable 25 Gram(s) IV Push once  dextrose 50% Injectable 12.5 Gram(s) IV Push once  dextrose 50% Injectable 25 Gram(s) IV Push once  furosemide    Tablet 20 milliGRAM(s) Oral daily  glucagon  Injectable 1 milliGRAM(s) IntraMuscular once  hydrocortisone hemorrhoidal Suppository 1 Suppository(s) Rectal at bedtime  insulin glargine Injectable (LANTUS) 15 Unit(s) SubCutaneous at bedtime  insulin lispro (ADMELOG) corrective regimen sliding scale   SubCutaneous three times a day before meals  insulin lispro (ADMELOG) corrective regimen sliding scale   SubCutaneous at bedtime  mepolizumab Subcutaneous Injectable 100 milliGRAM(s) SubCutaneous every 4 weeks  metoprolol tartrate 50 milliGRAM(s) Oral two times a day  montelukast 10 milliGRAM(s) Oral daily  multivitamin 1 Tablet(s) Oral daily  Nephro-carlos 1 Tablet(s) Oral daily  pantoprazole  Injectable 40 milliGRAM(s) IV Push every 12 hours  polyethylene glycol 3350 17 Gram(s) Oral daily  saccharomyces boulardii 250 milliGRAM(s) Oral two times a day  senna 2 Tablet(s) Oral at bedtime  simethicone 80 milliGRAM(s) Chew every 6 hours  sucralfate 1 Gram(s) Oral four times a day  tamsulosin 0.4 milliGRAM(s) Oral at bedtime  tiotropium 2.5 MICROgram(s) Inhaler 2 Puff(s) Inhalation daily    MEDICATIONS  (PRN):  albuterol/ipratropium for Nebulization 3 milliLiter(s) Nebulizer every 6 hours PRN Shortness of Breath and/or Wheezing  dextrose Oral Gel 15 Gram(s) Oral once PRN Blood Glucose LESS THAN 70 milliGRAM(s)/deciliter         Vitals log        ICU Vital Signs Last 24 Hrs  T(C): 36.7 (19 Mar 2023 05:32), Max: 36.8 (18 Mar 2023 20:15)  T(F): 98 (19 Mar 2023 05:32), Max: 98.2 (18 Mar 2023 20:15)  HR: 76 (19 Mar 2023 05:32) (76 - 84)  BP: 110/70 (19 Mar 2023 05:32) (101/67 - 110/70)  BP(mean): --  ABP: --  ABP(mean): --  RR: 16 (19 Mar 2023 05:32) (16 - 17)  SpO2: 98% (19 Mar 2023 05:32) (94% - 98%)    O2 Parameters below as of 19 Mar 2023 05:32  Patient On (Oxygen Delivery Method): BiPAP/CPAP                 Input and Output:  I&O's Detail    17 Mar 2023 07:01  -  18 Mar 2023 07:00  --------------------------------------------------------  IN:  Total IN: 0 mL    OUT:    Voided (mL): 200 mL  Total OUT: 200 mL    Total NET: -200 mL      18 Mar 2023 07:01  -  19 Mar 2023 06:06  --------------------------------------------------------  IN:    IV PiggyBack: 100 mL  Total IN: 100 mL    OUT:  Total OUT: 0 mL    Total NET: 100 mL          Lab Data                        9.4    8.15  )-----------( 270      ( 18 Mar 2023 07:05 )             29.9     03-18    144  |  107  |  10  ----------------------------<  134<H>  3.2<L>   |  34<H>  |  1.10    Ca    9.1      18 Mar 2023 07:05              Review of Systems	      Objective     Physical Examination    heart s1s2  lung dc BS  head nc      Pertinent Lab findings & Imaging      Eliecer:  NO   Adequate UO     I&O's Detail    17 Mar 2023 07:01  -  18 Mar 2023 07:00  --------------------------------------------------------  IN:  Total IN: 0 mL    OUT:    Voided (mL): 200 mL  Total OUT: 200 mL    Total NET: -200 mL      18 Mar 2023 07:01  -  19 Mar 2023 06:06  --------------------------------------------------------  IN:    IV PiggyBack: 100 mL  Total IN: 100 mL    OUT:  Total OUT: 0 mL    Total NET: 100 mL               Discussed with:     Cultures:	        Radiology

## 2023-03-19 NOTE — PROGRESS NOTE ADULT - ASSESSMENT
82 year old male with history of CAD s/p CABG, HLD, HTN, DM 2, COPD on home O2, PVD/LE stents, remote hx of L femoral fx with complicated course/multiple procedures/chronic OM sent by ID with draining sinus from left thigh.     1) Acute on chronic hypoxemic respiratory failure  - likely volume overload from transfusion  - Continue Lasix   - Weaned off supplemental oxygen    2) Chronic L femur OM/L thigh abscess   - IR was discussed and no drainage was recommended  - Continue Cefepime   - Will likely need PICC line next week for long term antibiotics  - ID follow up noted     3) Acute Blood Loss Anemia   - Likely due to GIB  - s/p 6 PRBCs  - s/p EGD on 3/13: showing gastric ulcer; gastritis; gastric ulcer; hiatal hernia  - s/p colonoscopy on 3/17: showing cecal polyp/avm s/p bx and clip   - Continue Protonix and Carafate   - Added Senna, Miralax and Anusol Suppositories for hemorrhoids   - GI follow up noted     4) History of Paroxysmal Atrial Tachycardia   - Continue Metoprolol  - Eliquis on hold, resume once cleared by GI    5) CAD / PVD  - Antiplatelets on hold, resume once cleared by GI  - Continue Lipitor and Metoprolol   - Cardio follow up noted     6) DM 2  - A1c 8  - Accu checks and ISS  - Increase Lantus to 18 units      7) Asthma / COPD / COURTNEY  - Continue Symbicort and Spiriva   - On Nucala every 4 weeks   - Nocturnal NIV  - Pulmonary follow up noted     8) Hypokalemia / Hypomagnesemia   - Replete    DVT Prophylaxis -- Venodyne     Dispo: Home with Home Care likely in 24-48 hours.     Updated patient's wife at bedside.

## 2023-03-19 NOTE — PROGRESS NOTE ADULT - SUBJECTIVE AND OBJECTIVE BOX
[facility]  Rich Sky PA-C  [address] [place] [address]  [phone]      INTERVAL HPI/OVERNIGHT EVENTS:  Pt s/e wife at bedside  s/p colonoscopy 3/17  Currently reports no GI complaints    MEDICATIONS  (STANDING):  ascorbic acid 500 milliGRAM(s) Oral daily  atorvastatin 20 milliGRAM(s) Oral at bedtime  bisacodyl 10 milliGRAM(s) Oral every 4 hours  budesonide 160 MICROgram(s)/formoterol 4.5 MICROgram(s) Inhaler 2 Puff(s) Inhalation two times a day  cholecalciferol 400 Unit(s) Oral daily  cyanocobalamin 1000 MICROGram(s) Oral daily  dextrose 5%. 1000 milliLiter(s) (50 mL/Hr) IV Continuous <Continuous>  dextrose 5%. 1000 milliLiter(s) (100 mL/Hr) IV Continuous <Continuous>  dextrose 50% Injectable 25 Gram(s) IV Push once  dextrose 50% Injectable 12.5 Gram(s) IV Push once  dextrose 50% Injectable 25 Gram(s) IV Push once  furosemide    Tablet 20 milliGRAM(s) Oral daily  glucagon  Injectable 1 milliGRAM(s) IntraMuscular once  insulin glargine Injectable (LANTUS) 15 Unit(s) SubCutaneous at bedtime  insulin lispro (ADMELOG) corrective regimen sliding scale   SubCutaneous at bedtime  insulin lispro (ADMELOG) corrective regimen sliding scale   SubCutaneous three times a day before meals  mepolizumab Subcutaneous Injectable 100 milliGRAM(s) SubCutaneous every 4 weeks  metoprolol tartrate 50 milliGRAM(s) Oral two times a day  montelukast 10 milliGRAM(s) Oral daily  multivitamin 1 Tablet(s) Oral daily  Nephro-carlos 1 Tablet(s) Oral daily  pantoprazole  Injectable 40 milliGRAM(s) IV Push every 12 hours  polyethylene glycol/electrolyte Solution 1000 milliLiter(s) Oral every 4 hours  saccharomyces boulardii 250 milliGRAM(s) Oral two times a day  simethicone 80 milliGRAM(s) Chew every 6 hours  sucralfate 1 Gram(s) Oral four times a day  tamsulosin 0.4 milliGRAM(s) Oral at bedtime  tiotropium 2.5 MICROgram(s) Inhaler 2 Puff(s) Inhalation daily    MEDICATIONS  (PRN):  albuterol/ipratropium for Nebulization 3 milliLiter(s) Nebulizer every 6 hours PRN Shortness of Breath and/or Wheezing  dextrose Oral Gel 15 Gram(s) Oral once PRN Blood Glucose LESS THAN 70 milliGRAM(s)/deciliter      Allergies    [This allergen will not trigger allergy alert] IV DYE, IODINE CONTRAST (Unknown)  [This allergen will not trigger allergy alert] NKDA (Unknown)  latex (Unknown)    Intolerances    shellfish (Nausea)      PHYSICAL EXAM:   Vital Signs Last 24 Hrs  T(C): 36.7 (19 Mar 2023 05:32), Max: 36.8 (18 Mar 2023 20:15)  T(F): 98 (19 Mar 2023 05:32), Max: 98.2 (18 Mar 2023 20:15)  HR: 76 (19 Mar 2023 05:32) (76 - 84)  BP: 110/70 (19 Mar 2023 05:32) (101/67 - 110/70)  BP(mean): --  RR: 16 (19 Mar 2023 05:32) (16 - 17)  SpO2: 98% (19 Mar 2023 05:32) (94% - 98%)    Parameters below as of 19 Mar 2023 05:32  Patient On (Oxygen Delivery Method): BiPAP/CPAP          GENERAL:  Appears stated age  HEENT:  NC/AT  CHEST:  Full & symmetric excursion  HEART:  Regular rhythm  ABDOMEN:  Soft, non-tender, non-distended  EXTEREMITIES:  no cyanosis  SKIN:  No rash  NEURO:  Alert      LABS:                        9.3    7.42  )-----------( 283      ( 19 Mar 2023 06:05 )             30.4                03-19    142  |  107  |  16  ----------------------------<  143<H>  3.5   |  32<H>  |  1.10    Ca    8.9      19 Mar 2023 06:05  Mg     1.4     03-19    TPro  5.4<L>  /  Alb  2.5<L>  /  TBili  0.3  /  DBili  x   /  AST  18  /  ALT  24  /  AlkPhos  76  03-19

## 2023-03-19 NOTE — PROCEDURE NOTE - ADDITIONAL PROCEDURE DETAILS
LUE PIV placed for emergency venous access, pt admitted for possible acute hypoxic resp failure 2/2 APE vs ACOPDE

## 2023-03-19 NOTE — CHART NOTE - NSCHARTNOTEFT_GEN_A_CORE
Assessment: patient seen for follow up    82y old  Male who presents with a chief complaint of Left  thigh draining sinus   status post colonoscopy biopsy and clip per GI EGD gastric ulcer gastritis hiatal hernia  per MD note will need PICC line  patient seen sleeping in bed. per CNA with good PO intake  3/19 BM         Factors impacting intake: [x ] none [ ] nausea  [ ] vomiting [ ] diarrhea [ ] constipation  [ ]chewing problems [ ] swallowing issues  [ ] other:     Diet Prescription: Diet, Consistent Carbohydrate w/Evening Snack (03-17-23 @ 14:13)    Intake: good PO per CNA     Current Weight: 219.3# 3/15 + 1 left and right foot edema    Pertinent Medications: MEDICATIONS  (STANDING):  ascorbic acid 500 milliGRAM(s) Oral daily  atorvastatin 20 milliGRAM(s) Oral at bedtime  budesonide 160 MICROgram(s)/formoterol 4.5 MICROgram(s) Inhaler 2 Puff(s) Inhalation two times a day  cefepime   IVPB 2000 milliGRAM(s) IV Intermittent every 8 hours  cholecalciferol 400 Unit(s) Oral daily  cyanocobalamin 1000 MICROGram(s) Oral daily  dextrose 5%. 1000 milliLiter(s) (100 mL/Hr) IV Continuous <Continuous>  dextrose 5%. 1000 milliLiter(s) (50 mL/Hr) IV Continuous <Continuous>  dextrose 50% Injectable 25 Gram(s) IV Push once  dextrose 50% Injectable 12.5 Gram(s) IV Push once  dextrose 50% Injectable 25 Gram(s) IV Push once  furosemide    Tablet 20 milliGRAM(s) Oral daily  glucagon  Injectable 1 milliGRAM(s) IntraMuscular once  hydrocortisone hemorrhoidal Suppository 1 Suppository(s) Rectal at bedtime  insulin glargine Injectable (LANTUS) 15 Unit(s) SubCutaneous at bedtime  insulin lispro (ADMELOG) corrective regimen sliding scale   SubCutaneous three times a day before meals  insulin lispro (ADMELOG) corrective regimen sliding scale   SubCutaneous at bedtime  mepolizumab Subcutaneous Injectable 100 milliGRAM(s) SubCutaneous every 4 weeks  metoprolol tartrate 50 milliGRAM(s) Oral two times a day  montelukast 10 milliGRAM(s) Oral daily  multivitamin 1 Tablet(s) Oral daily  Nephro-carlos 1 Tablet(s) Oral daily  pantoprazole  Injectable 40 milliGRAM(s) IV Push every 12 hours  polyethylene glycol 3350 17 Gram(s) Oral daily  saccharomyces boulardii 250 milliGRAM(s) Oral two times a day  senna 2 Tablet(s) Oral at bedtime  simethicone 80 milliGRAM(s) Chew every 6 hours  sucralfate 1 Gram(s) Oral four times a day  tamsulosin 0.4 milliGRAM(s) Oral at bedtime  tiotropium 2.5 MICROgram(s) Inhaler 2 Puff(s) Inhalation daily    MEDICATIONS  (PRN):  albuterol/ipratropium for Nebulization 3 milliLiter(s) Nebulizer every 6 hours PRN Shortness of Breath and/or Wheezing  dextrose Oral Gel 15 Gram(s) Oral once PRN Blood Glucose LESS THAN 70 milliGRAM(s)/deciliter    Pertinent Labs: POCT 210,137, 187  Skin:   left lateral thigh wound  Estimated Needs:   [x ] no change since previous assessment based on 214# 20-25kcals/kg 1940-2425kcals and 1.1-1.3 gms protein/kg  106-126gms  [ ] recalculated:     Previous Nutrition Diagnosis:   [x ] altered nutrition related labs (blood sugars)    Nutrition Diagnosis is [x ] ongoing  [ ] resolved [ ] not applicable     New Nutrition Diagnosis: [x ] not applicable       Interventions:   Recommend  [ ] Change Diet To:  [ ] Nutrition Supplement  [ ] Nutrition Support  [x ] Other: continue diet as ordered , recommend discontinue nephrovite already on MVI    Monitoring and Evaluation:   [x ] PO intake [ x ] Tolerance to diet prescription [ x ] weights [ x ] labs[ x ] follow up per protocol  [ ] other:

## 2023-03-19 NOTE — PROGRESS NOTE ADULT - SUBJECTIVE AND OBJECTIVE BOX
[facility]    Génesis Villavicencio, [place], Kumar Hodges      [phone]    CHIEF COMPLAINT: Patient is a 82y old  Male who presents with a chief complaint of Left  thigh draining sinus (19 Mar 2023 06:06)      Follow Up: copd, cad    Interim history: The patient reports no new symptoms.  Denies chest discomfort and shortness of breath.  No abdominal pain.  No new neurologic symptoms.      MEDICATIONS  (STANDING):  ascorbic acid 500 milliGRAM(s) Oral daily  atorvastatin 20 milliGRAM(s) Oral at bedtime  budesonide 160 MICROgram(s)/formoterol 4.5 MICROgram(s) Inhaler 2 Puff(s) Inhalation two times a day  cefepime   IVPB 2000 milliGRAM(s) IV Intermittent every 8 hours  cholecalciferol 400 Unit(s) Oral daily  cyanocobalamin 1000 MICROGram(s) Oral daily  dextrose 5%. 1000 milliLiter(s) (100 mL/Hr) IV Continuous <Continuous>  dextrose 5%. 1000 milliLiter(s) (50 mL/Hr) IV Continuous <Continuous>  dextrose 50% Injectable 25 Gram(s) IV Push once  dextrose 50% Injectable 12.5 Gram(s) IV Push once  dextrose 50% Injectable 25 Gram(s) IV Push once  furosemide    Tablet 20 milliGRAM(s) Oral daily  glucagon  Injectable 1 milliGRAM(s) IntraMuscular once  hydrocortisone hemorrhoidal Suppository 1 Suppository(s) Rectal at bedtime  insulin glargine Injectable (LANTUS) 15 Unit(s) SubCutaneous at bedtime  insulin lispro (ADMELOG) corrective regimen sliding scale   SubCutaneous three times a day before meals  insulin lispro (ADMELOG) corrective regimen sliding scale   SubCutaneous at bedtime  mepolizumab Subcutaneous Injectable 100 milliGRAM(s) SubCutaneous every 4 weeks  metoprolol tartrate 50 milliGRAM(s) Oral two times a day  montelukast 10 milliGRAM(s) Oral daily  multivitamin 1 Tablet(s) Oral daily  Nephro-carlos 1 Tablet(s) Oral daily  pantoprazole  Injectable 40 milliGRAM(s) IV Push every 12 hours  polyethylene glycol 3350 17 Gram(s) Oral daily  saccharomyces boulardii 250 milliGRAM(s) Oral two times a day  senna 2 Tablet(s) Oral at bedtime  simethicone 80 milliGRAM(s) Chew every 6 hours  sucralfate 1 Gram(s) Oral four times a day  tamsulosin 0.4 milliGRAM(s) Oral at bedtime  tiotropium 2.5 MICROgram(s) Inhaler 2 Puff(s) Inhalation daily    MEDICATIONS  (PRN):  albuterol/ipratropium for Nebulization 3 milliLiter(s) Nebulizer every 6 hours PRN Shortness of Breath and/or Wheezing  dextrose Oral Gel 15 Gram(s) Oral once PRN Blood Glucose LESS THAN 70 milliGRAM(s)/deciliter      REVIEW OF SYSTEMS:  eye, ent, GI, , allergic, dermatologic, musculoskeletal and neurologic are negative except as described above    Vital Signs Last 24 Hrs  T(C): 36.7 (19 Mar 2023 05:32), Max: 36.8 (18 Mar 2023 20:15)  T(F): 98 (19 Mar 2023 05:32), Max: 98.2 (18 Mar 2023 20:15)  HR: 76 (19 Mar 2023 05:32) (76 - 84)  BP: 110/70 (19 Mar 2023 05:32) (101/67 - 110/70)  BP(mean): --  RR: 16 (19 Mar 2023 05:32) (16 - 17)  SpO2: 98% (19 Mar 2023 05:32) (94% - 98%)    Parameters below as of 19 Mar 2023 05:32  Patient On (Oxygen Delivery Method): BiPAP/CPAP        I&O's Summary    18 Mar 2023 07:01  -  19 Mar 2023 07:00  --------------------------------------------------------  IN: 200 mL / OUT: 0 mL / NET: 200 mL        Telemetry past 24h:    PHYSICAL EXAM:    Constitutional: well-nourished, well-developed, NAD   HEENT:  MMM, sclerae anicteric, conjunctivae clear, no oral cyanosis.  Pulmonary: Non-labored, breath sounds are clear bilaterally, No wheezing, rales or rhonchi  Cardiovascular: Regular, S1 and S2.  No murmur.  No rubs, gallops or clicks  Gastrointestinal: Bowel Sounds present, soft, nontender.   Lymph: min to mild peripheral edema.   Neurological: Alert, no focal deficits  Skin: No rashes.  Psych:  Mood & affect appropriate    LABS: All Labs Reviewed:                        9.3    7.42  )-----------( 283      ( 19 Mar 2023 06:05 )             30.4                         9.4    8.15  )-----------( 270      ( 18 Mar 2023 07:05 )             29.9                         8.9    7.24  )-----------( 263      ( 17 Mar 2023 05:54 )             29.1     19 Mar 2023 06:05    142    |  107    |  16     ----------------------------<  143    3.5     |  32     |  1.10   18 Mar 2023 07:05    144    |  107    |  10     ----------------------------<  134    3.2     |  34     |  1.10   17 Mar 2023 05:54    145    |  109    |  10     ----------------------------<  116    3.0     |  32     |  0.98     Ca    8.9        19 Mar 2023 06:05  Ca    9.1        18 Mar 2023 07:05  Ca    9.1        17 Mar 2023 05:54  Phos  2.5       17 Mar 2023 05:54  Mg     1.4       19 Mar 2023 06:05  Mg     1.6       17 Mar 2023 05:54    TPro  5.4    /  Alb  2.5    /  TBili  0.3    /  DBili  x      /  AST  18     /  ALT  24     /  AlkPhos  76     19 Mar 2023 06:05          Blood Culture:         RADIOLOGY:    EKG:    Echo:

## 2023-03-19 NOTE — CONSULT NOTE ADULT - SUBJECTIVE AND OBJECTIVE BOX
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 Admitted to medicine, hospital course complicated by acute chronic respiratory failure, IR consulted for drainage but deemed not a candidate, on cefepime.      Events last 24 hours: RRT called for increased secretions, and hypoxic respiratory failure.     PAST MEDICAL & SURGICAL HISTORY:  Diabetes Mellitus, Type II      CAD (Coronary Artery Disease)  s/p 3v CABG ; stents placed in [place] in       Dyslipidemia      Osteomyelitis      COPD (chronic obstructive pulmonary disease)  on 2L at home and BiPAP at night; intubated       Hypertension      PVD (peripheral vascular disease)      History of PAT (paroxysmal atrial tachycardia)      Asthma with COPD      BPH (benign prostatic hyperplasia)      Acute osteomyelitis      CABG (Coronary Artery Bypass Graft)        Compound fracture  left leg      S/P primary angioplasty with coronary stent      H/O drainage of abscess  Left femur 2021        Allergies    No Known Allergies    Intolerances      FAMILY HISTORY:  Family history of diabetes mellitus (Sibling)    Family hx of lung cancer  brother,  age 82, used to smoke with pt        Review of Systems:  Unable to assess secondary to severe respiratory distress, and secretions.       Medications:  cefepime   IVPB 2000 milliGRAM(s) IV Intermittent every 8 hours    furosemide    Tablet 20 milliGRAM(s) Oral daily  furosemide   Injectable 80 milliGRAM(s) IV Push once  metoprolol tartrate 50 milliGRAM(s) Oral two times a day    albuterol/ipratropium for Nebulization 3 milliLiter(s) Nebulizer every 6 hours PRN  budesonide 160 MICROgram(s)/formoterol 4.5 MICROgram(s) Inhaler 2 Puff(s) Inhalation two times a day  montelukast 10 milliGRAM(s) Oral daily  tiotropium 2.5 MICROgram(s) Inhaler 2 Puff(s) Inhalation daily          pantoprazole    Tablet 40 milliGRAM(s) Oral every 12 hours  polyethylene glycol 3350 17 Gram(s) Oral daily  senna 2 Tablet(s) Oral at bedtime  simethicone 80 milliGRAM(s) Chew every 6 hours  sucralfate 1 Gram(s) Oral four times a day    tamsulosin 0.4 milliGRAM(s) Oral at bedtime    atorvastatin 20 milliGRAM(s) Oral at bedtime  dextrose 50% Injectable 25 Gram(s) IV Push once  dextrose 50% Injectable 12.5 Gram(s) IV Push once  dextrose 50% Injectable 25 Gram(s) IV Push once  dextrose Oral Gel 15 Gram(s) Oral once PRN  glucagon  Injectable 1 milliGRAM(s) IntraMuscular once  insulin glargine Injectable (LANTUS) 18 Unit(s) SubCutaneous at bedtime  insulin lispro (ADMELOG) corrective regimen sliding scale   SubCutaneous three times a day before meals  insulin lispro (ADMELOG) corrective regimen sliding scale   SubCutaneous at bedtime    ascorbic acid 500 milliGRAM(s) Oral daily  cholecalciferol 400 Unit(s) Oral daily  cyanocobalamin 1000 MICROGram(s) Oral daily  dextrose 5%. 1000 milliLiter(s) IV Continuous <Continuous>  dextrose 5%. 1000 milliLiter(s) IV Continuous <Continuous>  magnesium oxide 400 milliGRAM(s) Oral three times a day with meals  multivitamin 1 Tablet(s) Oral daily  Nephro-carlos 1 Tablet(s) Oral daily    mepolizumab Subcutaneous Injectable 100 milliGRAM(s) SubCutaneous every 4 weeks    hydrocortisone hemorrhoidal Suppository 1 Suppository(s) Rectal at bedtime    saccharomyces boulardii 250 milliGRAM(s) Oral two times a day          ICU Vital Signs Last 24 Hrs  T(C): 36.4 (19 Mar 2023 13:01), Max: 36.8 (18 Mar 2023 20:15)  T(F): 97.5 (19 Mar 2023 13:01), Max: 98.2 (18 Mar 2023 20:15)  HR: 85 (19 Mar 2023 13:01) (76 - 85)  BP: 109/64 (19 Mar 2023 13:01) (101/67 - 110/70)  BP(mean): --  ABP: --  ABP(mean): --  RR: 17 (19 Mar 2023 13:01) (16 - 17)  SpO2: 93% (19 Mar 2023 13:01) (93% - 98%)    O2 Parameters below as of 19 Mar 2023 13:01  Patient On (Oxygen Delivery Method): nasal cannula          Vital Signs Last 24 Hrs  T(C): 36.4 (19 Mar 2023 13:01), Max: 36.8 (18 Mar 2023 20:15)  T(F): 97.5 (19 Mar 2023 13:01), Max: 98.2 (18 Mar 2023 20:15)  HR: 85 (19 Mar 2023 13:) (76 - 85)  BP: 109/64 (19 Mar 2023 13:) (101/67 - 110/70)  BP(mean): --  RR: 17 (19 Mar 2023 13:) (16 - 17)  SpO2: 93% (19 Mar 2023 13:) (93% - 98%)    Parameters below as of 19 Mar 2023 13:01  Patient On (Oxygen Delivery Method): nasal cannula            I&O's Detail    18 Mar 2023 07:01  -  19 Mar 2023 07:00  --------------------------------------------------------  IN:    IV PiggyBack: 200 mL  Total IN: 200 mL    OUT:  Total OUT: 0 mL    Total NET: 200 mL      19 Mar 2023 07:01  -  19 Mar 2023 18:12  --------------------------------------------------------  IN:  Total IN: 0 mL    OUT:    Voided (mL): 1000 mL  Total OUT: 1000 mL    Total NET: -1000 mL            LABS:                        9.3    7.42  )-----------( 283      ( 19 Mar 2023 06:05 )             30.4     03-19    142  |  107  |  16  ----------------------------<  143<H>  3.5   |  32<H>  |  1.10    Ca    8.9      19 Mar 2023 06:05  Mg     1.4     03-19    TPro  5.4<L>  /  Alb  2.5<L>  /  TBili  0.3  /  DBili  x   /  AST  18  /  ALT  24  /  AlkPhos  76  03-19          CAPILLARY BLOOD GLUCOSE      POCT Blood Glucose.: 154 mg/dL (19 Mar 2023 17:51)        CULTURES:      Physical Examination:    General: Pt laying in hospital bed in severe respiratory distress.  Alert, , interactive, responsive    HEENT: Pupils equal, reactive to light.  Symmetric.    PULM: Severe Frothy secretions in oral mucosa, Rhonchi to auscultation bilaterally.     CVS: Sinus tachycardia, HTN to 200/100's, no murmurs, rubs, or gallops    ABD: Soft, nondistended, nontender, normoactive bowel sounds, no masses    EXT: No edema, nontender    SKIN: Warm and well perfused    RADIOLOGY:   < from: Xray Chest 1 View-PORTABLE IMMEDIATE (Xray Chest 1 View-PORTABLE IMMEDIATE .) (23 @ 12:23) >  ACC: [id number] EXAM:  XR CHEST PORTABLE IMMED 1V   ORDERED BY: SALAZAR HANNAH     PROCEDURE DATE:  2023          INTERPRETATION:  INDICATIONS: 82-year-old male with cellulitis of left   lower limb    Prior examination for comparison: 3/7/2023    Technique: AP view of chest    Findings:    Sternotomy wires are present. The lungs are clear. There are no pleural   effusions. The cardiomediastinal silhouette is normal. Bones are grossly   normal.    IMPRESSION: No evidence of acute cardiopulmonary disease.    --- End of Report ---            ZARINA TEMPLE MD; Attending Interventional Radiologist  This document has been electronically signed. Mar 18 2023  9:15AM          CRITICAL CARE TIME SPENT: 45 minutes assessing presenting problems of acute illness, which pose high probability of life threatening deterioration or end organ damage/dysfunction, as well as medical decision making including initiating plan of care, reviewing data, reviewing radiologic exams, discussing with multidisciplinary team,  discussing goals of care with patient/family, and writing this note.  Non-inclusive of procedures performed,

## 2023-03-19 NOTE — CONSULT NOTE ADULT - ASSESSMENT
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 Admitted to medicine, hospital course complicated by acute chronic respiratory failure, IR consulted for drainage but deemed not a candidate, on cefepime.      1. Acute pulmonary edema   2. Hypertension  3. Chronic osteomyelitis     Plan:   Neuro: Awake and responsive, avoid sedation if possible.     CV: Flash pulmonary edema in setting of acute HTN, treated w/ 80 IVP lasix to off load fluid, started on BiPAP 12/6 for additional PEEP and respiratory support, will monitor and treat w/ hydralazine or labetalol if BP remains elevated.     Pulm: Flash pulm edema placed on BiPAP, pt remains high risk for decompensation and intubation.     GI: Diet: NPO while on BiPAP.     Renal: No active issues, continue to monitor and avoid nephrotoxic meds.     Endo: Glucose <180, mag>2, K>4 for arrythmia suppression.     Heme: Continue to monitor Hb>7 transfuse if needed. Hold DVT PPX in setting of previous GIB.     ID: Chronic osteomyelitis on cefepime, ID following. Continue to trend markers of infections.     Case discussed w/ attending Dr. Sanchez.

## 2023-03-19 NOTE — PROGRESS NOTE ADULT - SUBJECTIVE AND OBJECTIVE BOX
Cellulitis of left lower extremity    HPI:  82 yr male with history of Hypertension, COPD home oxygen dependent, CAD CABG , chronic left femor osteomyelitis since traumatic left femoral fracture in 1966.   Patient follow with ID dr العلي for mgt of his chronic femoral osteomyelitis . Has abscess and drainage in November 2022. Present with one day of new yellowish  drainage from left thigh. No fever or chills or other constitutional symptoms.  (06 Mar 2023 20:35)    Interval History:  Patient was seen and examined at bedside around 9 am.  Doing well.   No abdominal pain, nausea or vomiting.  Denies chest pain, palpitations, shortness of breath, headache, dizziness or visual symptoms.    ROS:  As per interval history otherwise unremarkable.    PHYSICAL EXAM:  Vital Signs   T(C): 36.4 (19 Mar 2023 13:01), Max: 36.8 (18 Mar 2023 20:15)  T(F): 97.5 (19 Mar 2023 13:01), Max: 98.2 (18 Mar 2023 20:15)  HR: 85 (19 Mar 2023 13:01) (76 - 85)  BP: 109/64 (19 Mar 2023 13:01) (101/67 - 110/70)  RR: 17 (19 Mar 2023 13:01) (16 - 17)  SpO2: 93% (19 Mar 2023 13:01) (93% - 98%)  Parameters below as of 19 Mar 2023 13:01  Patient On (Oxygen Delivery Method): nasal cannula  General: Elderly male sitting in bed comfortably. No acute distress  HEENT: EOMI. Clear conjunctivae. Moist mucus membrane  Neck: Supple.   Chest: CTA bilaterally. No wheezing, rales or rhonchi.   Heart: Normal S1 & S2. RRR.   Abdomen: Obese. Soft. Non-tender. + BS  Ext: 1+ pedal edema. No calf tenderness. Draining sinus in left thigh. Surrounding skin with no erythema, warmth or tenderness.    Neuro: Active and alert. No focal deficit. No speech disorder  Skin: Warm and Dry  Psychiatry: Normal mood and affect    I&O's Summary    18 Mar 2023 07:01  -  19 Mar 2023 07:00  --------------------------------------------------------  IN: 200 mL / OUT: 0 mL / NET: 200 mL    19 Mar 2023 07:01  -  19 Mar 2023 14:52  --------------------------------------------------------  IN: 0 mL / OUT: 1000 mL / NET: -1000 mL    LABS:  CAPILLARY BLOOD GLUCOSE  POCT Blood Glucose.: 210 mg/dL (19 Mar 2023 11:57)  POCT Blood Glucose.: 137 mg/dL (19 Mar 2023 08:04)  POCT Blood Glucose.: 187 mg/dL (18 Mar 2023 21:08)  POCT Blood Glucose.: 121 mg/dL (18 Mar 2023 16:49)                      9.3    7.42  )-----------( 283      ( 19 Mar 2023 06:05 )             30.4     03-19    142  |  107  |  16  ----------------------------<  143<H>  3.5   |  32<H>  |  1.10    Ca    8.9      19 Mar 2023 06:05  Mg     1.4     03-19    TPro  5.4<L>  /  Alb  2.5<L>  /  TBili  0.3  /  DBili  x   /  AST  18  /  ALT  24  /  AlkPhos  76  03-19    RADIOLOGY & ADDITIONAL STUDIES:  Reviewed     MEDICATIONS  (STANDING):  ascorbic acid 500 milliGRAM(s) Oral daily  atorvastatin 20 milliGRAM(s) Oral at bedtime  budesonide 160 MICROgram(s)/formoterol 4.5 MICROgram(s) Inhaler 2 Puff(s) Inhalation two times a day  cefepime   IVPB 2000 milliGRAM(s) IV Intermittent every 8 hours  cholecalciferol 400 Unit(s) Oral daily  cyanocobalamin 1000 MICROGram(s) Oral daily  dextrose 5%. 1000 milliLiter(s) (100 mL/Hr) IV Continuous <Continuous>  dextrose 5%. 1000 milliLiter(s) (50 mL/Hr) IV Continuous <Continuous>  dextrose 50% Injectable 25 Gram(s) IV Push once  dextrose 50% Injectable 12.5 Gram(s) IV Push once  dextrose 50% Injectable 25 Gram(s) IV Push once  furosemide    Tablet 20 milliGRAM(s) Oral daily  glucagon  Injectable 1 milliGRAM(s) IntraMuscular once  hydrocortisone hemorrhoidal Suppository 1 Suppository(s) Rectal at bedtime  insulin glargine Injectable (LANTUS) 15 Unit(s) SubCutaneous at bedtime  insulin lispro (ADMELOG) corrective regimen sliding scale   SubCutaneous three times a day before meals  insulin lispro (ADMELOG) corrective regimen sliding scale   SubCutaneous at bedtime  mepolizumab Subcutaneous Injectable 100 milliGRAM(s) SubCutaneous every 4 weeks  metoprolol tartrate 50 milliGRAM(s) Oral two times a day  montelukast 10 milliGRAM(s) Oral daily  multivitamin 1 Tablet(s) Oral daily  Nephro-carlos 1 Tablet(s) Oral daily  pantoprazole  Injectable 40 milliGRAM(s) IV Push every 12 hours  polyethylene glycol 3350 17 Gram(s) Oral daily  saccharomyces boulardii 250 milliGRAM(s) Oral two times a day  senna 2 Tablet(s) Oral at bedtime  simethicone 80 milliGRAM(s) Chew every 6 hours  sucralfate 1 Gram(s) Oral four times a day  tamsulosin 0.4 milliGRAM(s) Oral at bedtime  tiotropium 2.5 MICROgram(s) Inhaler 2 Puff(s) Inhalation daily    MEDICATIONS  (PRN):  albuterol/ipratropium for Nebulization 3 milliLiter(s) Nebulizer every 6 hours PRN Shortness of Breath and/or Wheezing  dextrose Oral Gel 15 Gram(s) Oral once PRN Blood Glucose LESS THAN 70 milliGRAM(s)/deciliter

## 2023-03-19 NOTE — PROGRESS NOTE ADULT - ASSESSMENT
82 yr male with history of Hypertension, COPD home oxygen dependent, CAD CABG , chronic left femor osteomyelitis since traumatic left femoral fracture in 1966 presents with new yellowish  drainage from left thigh. Cardiology consulted for clearance for GI scopy.     CAD s/p CABG  - EKG showed SR @ 93.  No evidence of any active ischemia.  No anginal complaints  - Continue BB and statin   - Please resume ASA when cleared by GI    - Previous TTE 12/21 showed overall normal systolic function with an estimated LVEF of 55-60%.   - Pt sees Dr. Rod for cardiology.   - No evidence of any meaningful volume overload laying flat.  No O2 requirement.  min to mild edema. Continue home Lasix 20 mg PO daily   - s/p EGD gastritis, gastric ulcer, hiatal hernia. s/p colon cecal polyp and avb, clipped.  Follow GI recs      - SCDs for DVT prophylaxis   - Monitor and replete lytes, keep K>4, Mg>2.  - Will continue to follow

## 2023-03-19 NOTE — CHART NOTE - NSCHARTNOTEFT_GEN_A_CORE
Rapid response was called at _____ for hypoxia.    Events leading up to Rapid Response:  Pt was found to be hypoxic  Patient was seen and examined at the bedside by the rapid response team.  ___ () / ICU PA at bedside.    Rapid Response Vital Signs:    BP:  HR:  RR:  SpO2: % on  Temp:  FS:      Physical Exam:  Gen: ill-appearing, in mild respiratory distress 2/2 respiratory distress  HEENT: NCAT, PEERLA b/l, EOMI b/l  Cardio: regular rate and rhythm, +S1S2  Pulm: significant secretions, increased work of breathing, coarse breath sounds b/l  Abdomen: soft, nontender, nondistended  Extremities: b/l LE edema, no calf tenderness  Neuro: alert, awake, appropriately responsive to questions and commands  Skin: warm and dry      Assessment/Plan:   82 year old male with history of CAD s/p CABG, HLD, HTN, DM 2, COPD on home O2, PVD/LE stents, remote hx of L femoral fx with complicated course/multiple procedures/chronic OM sent by ID with draining sinus from left thigh. Rapid response called for increased work of breathing.  - Hypoxia likely 2/2 flash pulmonary edema  - IV Lasix 80mg x1 STAT  - BIPAP ordered  - Transferred to ICU for further management   - Discussed with Dr. Sanchez, agrees with above plan  - Family updated by ____  - RN to call if any changes Rapid response was called at 18:01 for hypoxia.    Events leading up to Rapid Response:   RN found pt to have increased mucus secretions, elevated HR and increased work of breathing. RN attempted to suction secretions with little improvement in respiratory status. RRT was called. Patient was seen and examined at the bedside by the rapid response team. Dr. Sanchez / ICU PA at bedside.    Rapid Response Vital Signs:    BP: 220/116  HR: 120  SpO2: 64% on RA  Temp: 97.9F  FS: 154      Physical Exam:  Gen: ill-appearing, in mild respiratory distress 2/2 respiratory distress  HEENT: NCAT, PEERLA b/l, EOMI b/l  Cardio: regular rate and rhythm, +S1S2  Pulm: significant secretions, increased work of breathing, coarse breath sounds b/l  Abdomen: soft, nontender, nondistended  Extremities: b/l LE edema, no calf tenderness  Neuro: alert, awake, appropriately responsive to questions and commands  Skin: warm and dry      Assessment/Plan:   82 year old male with history of CAD s/p CABG, HLD, HTN, DM 2, COPD on home O2, PVD/LE stents, remote hx of L femoral fx with complicated course/multiple procedures/chronic OM sent by ID with draining sinus from left thigh. Rapid response called for hypoxia.   - Hypoxia likely 2/2 flash pulmonary edema  - IV Lasix 80mg x1 STAT  - BIPAP ordered  - Transferred to ICU for further management   - Discussed with Dr. Sanchez and Dr. Mathews, agrees with above plan  - Family updated by ____  - RN to call if any changes Rapid response was called at 18:01 for hypoxia.    Events leading up to Rapid Response:   RN found pt to have increased mucus secretions, elevated HR and increased work of breathing. RN attempted to suction secretions with little improvement in respiratory status. RRT was called. Patient was seen and examined at the bedside by the rapid response team. Dr. Sanchez / ICU PA at bedside.    Rapid Response Vital Signs:    BP: 220/116  HR: 120  SpO2: 64% on RA  Temp: 97.9F  FS: 154      Physical Exam:  Gen: ill-appearing, in mild respiratory distress 2/2 respiratory distress  HEENT: NCAT, PEERLA b/l, EOMI b/l  Cardio: regular rate and rhythm, +S1S2  Pulm: significant secretions, increased work of breathing, coarse breath sounds b/l  Abdomen: soft, nontender, nondistended  Extremities: b/l LE edema, no calf tenderness  Neuro: alert, awake, appropriately responsive to questions and commands  Skin: warm and dry      Assessment/Plan:   82 year old male with history of CAD s/p CABG, HLD, HTN, DM 2, COPD on home O2, PVD/LE stents, remote hx of L femoral fx with complicated course/multiple procedures/chronic OM sent by ID with draining sinus from left thigh. Rapid response called for hypoxia.   - Hypoxia likely 2/2 flash pulmonary edema  - IV Lasix 80mg x1 STAT  - BIPAP ordered  - Transferred to ICU for further management   - Discussed with Dr. Sanchez and Dr. Mathews, agrees with above plan  - Family updated by Dr. Winter, PGY3 spoke with Sania, Wife.   - RN to call if any changes Rapid response was called at 18:01 for hypoxia.    Events leading up to Rapid Response:   RN found pt to have increased mucus secretions, elevated HR and increased work of breathing. RN attempted to suction secretions with little improvement in respiratory status. RRT was called. Patient was seen and examined at the bedside by the rapid response team. Dr. Sanchez / ICU PA at bedside.    Rapid Response Vital Signs:    BP: 220/116  HR: 120  SpO2: 64% on RA  Temp: 97.9F  FS: 154      Physical Exam:  Gen: ill-appearing, in mild respiratory distress 2/2 respiratory distress  HEENT: NCAT, PEERLA b/l, EOMI b/l  Cardio: regular rate and rhythm, +S1S2  Pulm: significant secretions, increased work of breathing, coarse breath sounds b/l, b/l rales  Abdomen: soft, nontender, nondistended  Extremities: b/l LE edema, no calf tenderness  Neuro: alert, awake, appropriately responsive to questions and commands  Skin: warm and dry      Assessment/Plan:   82 year old male with history of CAD s/p CABG, HLD, HTN, DM 2, COPD on home O2, PVD/LE stents, remote hx of L femoral fx with complicated course/multiple procedures/chronic OM sent by ID with draining sinus from left thigh. Rapid response called for hypoxia.   - Hypoxia likely 2/2 flash pulmonary edema  - IV Lasix 80mg x1 STAT  - BIPAP ordered  - Transferred to ICU for further management   - Discussed with Dr. Sanchez and Dr. Mathews, agrees with above plan  - Family updated by Dr. Winter, PGY3 spoke with Sania, Wife.   - RN to call if any changes

## 2023-03-20 LAB
ANION GAP SERPL CALC-SCNC: 5 MMOL/L — SIGNIFICANT CHANGE UP (ref 5–17)
BUN SERPL-MCNC: 22 MG/DL — SIGNIFICANT CHANGE UP (ref 7–23)
CALCIUM SERPL-MCNC: 9.2 MG/DL — SIGNIFICANT CHANGE UP (ref 8.5–10.1)
CHLORIDE SERPL-SCNC: 104 MMOL/L — SIGNIFICANT CHANGE UP (ref 96–108)
CO2 SERPL-SCNC: 31 MMOL/L — SIGNIFICANT CHANGE UP (ref 22–31)
CREAT SERPL-MCNC: 1.4 MG/DL — HIGH (ref 0.5–1.3)
EGFR: 50 ML/MIN/1.73M2 — LOW
GLUCOSE SERPL-MCNC: 260 MG/DL — HIGH (ref 70–99)
HCT VFR BLD CALC: 34.5 % — LOW (ref 39–50)
HGB BLD-MCNC: 10.4 G/DL — LOW (ref 13–17)
MAGNESIUM SERPL-MCNC: 1.6 MG/DL — SIGNIFICANT CHANGE UP (ref 1.6–2.6)
MCHC RBC-ENTMCNC: 28.2 PG — SIGNIFICANT CHANGE UP (ref 27–34)
MCHC RBC-ENTMCNC: 30.1 GM/DL — LOW (ref 32–36)
MCV RBC AUTO: 93.5 FL — SIGNIFICANT CHANGE UP (ref 80–100)
NRBC # BLD: 0 /100 WBCS — SIGNIFICANT CHANGE UP (ref 0–0)
PHOSPHATE SERPL-MCNC: 3 MG/DL — SIGNIFICANT CHANGE UP (ref 2.5–4.5)
PLATELET # BLD AUTO: 294 K/UL — SIGNIFICANT CHANGE UP (ref 150–400)
POTASSIUM SERPL-MCNC: 4 MMOL/L — SIGNIFICANT CHANGE UP (ref 3.5–5.3)
POTASSIUM SERPL-SCNC: 4 MMOL/L — SIGNIFICANT CHANGE UP (ref 3.5–5.3)
RBC # BLD: 3.69 M/UL — LOW (ref 4.2–5.8)
RBC # FLD: 14.8 % — HIGH (ref 10.3–14.5)
SODIUM SERPL-SCNC: 140 MMOL/L — SIGNIFICANT CHANGE UP (ref 135–145)
WBC # BLD: 14.78 K/UL — HIGH (ref 3.8–10.5)
WBC # FLD AUTO: 14.78 K/UL — HIGH (ref 3.8–10.5)

## 2023-03-20 PROCEDURE — 99233 SBSQ HOSP IP/OBS HIGH 50: CPT

## 2023-03-20 PROCEDURE — 99233 SBSQ HOSP IP/OBS HIGH 50: CPT | Mod: GC

## 2023-03-20 PROCEDURE — 99291 CRITICAL CARE FIRST HOUR: CPT

## 2023-03-20 RX ORDER — AZITHROMYCIN 500 MG/1
250 TABLET, FILM COATED ORAL
Refills: 0 | Status: DISCONTINUED | OUTPATIENT
Start: 2023-03-20 | End: 2023-03-22

## 2023-03-20 RX ORDER — INSULIN LISPRO 100/ML
VIAL (ML) SUBCUTANEOUS
Refills: 0 | Status: DISCONTINUED | OUTPATIENT
Start: 2023-03-20 | End: 2023-03-22

## 2023-03-20 RX ORDER — INSULIN LISPRO 100/ML
VIAL (ML) SUBCUTANEOUS EVERY 6 HOURS
Refills: 0 | Status: DISCONTINUED | OUTPATIENT
Start: 2023-03-20 | End: 2023-03-20

## 2023-03-20 RX ORDER — FUROSEMIDE 40 MG
20 TABLET ORAL DAILY
Refills: 0 | Status: DISCONTINUED | OUTPATIENT
Start: 2023-03-20 | End: 2023-03-22

## 2023-03-20 RX ORDER — AZITHROMYCIN 500 MG/1
500 TABLET, FILM COATED ORAL
Refills: 0 | Status: DISCONTINUED | OUTPATIENT
Start: 2023-03-20 | End: 2023-03-20

## 2023-03-20 RX ADMIN — Medication 40 MILLIGRAM(S): at 05:23

## 2023-03-20 RX ADMIN — Medication 1 GRAM(S): at 17:35

## 2023-03-20 RX ADMIN — TAMSULOSIN HYDROCHLORIDE 0.4 MILLIGRAM(S): 0.4 CAPSULE ORAL at 21:16

## 2023-03-20 RX ADMIN — Medication 3 MILLILITER(S): at 13:24

## 2023-03-20 RX ADMIN — Medication 1 TABLET(S): at 12:19

## 2023-03-20 RX ADMIN — AZITHROMYCIN 250 MILLIGRAM(S): 500 TABLET, FILM COATED ORAL at 12:19

## 2023-03-20 RX ADMIN — PREGABALIN 1000 MICROGRAM(S): 225 CAPSULE ORAL at 12:20

## 2023-03-20 RX ADMIN — PANTOPRAZOLE SODIUM 40 MILLIGRAM(S): 20 TABLET, DELAYED RELEASE ORAL at 17:35

## 2023-03-20 RX ADMIN — Medication 400 UNIT(S): at 12:20

## 2023-03-20 RX ADMIN — SODIUM CHLORIDE 4 MILLILITER(S): 9 INJECTION INTRAMUSCULAR; INTRAVENOUS; SUBCUTANEOUS at 19:32

## 2023-03-20 RX ADMIN — CEFEPIME 100 MILLIGRAM(S): 1 INJECTION, POWDER, FOR SOLUTION INTRAMUSCULAR; INTRAVENOUS at 05:23

## 2023-03-20 RX ADMIN — Medication 2: at 05:56

## 2023-03-20 RX ADMIN — Medication 500 MILLIGRAM(S): at 12:18

## 2023-03-20 RX ADMIN — ATORVASTATIN CALCIUM 20 MILLIGRAM(S): 80 TABLET, FILM COATED ORAL at 21:16

## 2023-03-20 RX ADMIN — CEFEPIME 100 MILLIGRAM(S): 1 INJECTION, POWDER, FOR SOLUTION INTRAMUSCULAR; INTRAVENOUS at 21:18

## 2023-03-20 RX ADMIN — Medication 1 GRAM(S): at 12:18

## 2023-03-20 RX ADMIN — Medication 2: at 12:20

## 2023-03-20 RX ADMIN — POLYETHYLENE GLYCOL 3350 17 GRAM(S): 17 POWDER, FOR SOLUTION ORAL at 12:18

## 2023-03-20 RX ADMIN — SIMETHICONE 80 MILLIGRAM(S): 80 TABLET, CHEWABLE ORAL at 23:01

## 2023-03-20 RX ADMIN — CHLORHEXIDINE GLUCONATE 1 APPLICATION(S): 213 SOLUTION TOPICAL at 05:23

## 2023-03-20 RX ADMIN — Medication 3: at 21:13

## 2023-03-20 RX ADMIN — Medication 3 MILLILITER(S): at 19:31

## 2023-03-20 RX ADMIN — INSULIN GLARGINE 18 UNIT(S): 100 INJECTION, SOLUTION SUBCUTANEOUS at 21:15

## 2023-03-20 RX ADMIN — SODIUM CHLORIDE 4 MILLILITER(S): 9 INJECTION INTRAMUSCULAR; INTRAVENOUS; SUBCUTANEOUS at 08:43

## 2023-03-20 RX ADMIN — SODIUM CHLORIDE 4 MILLILITER(S): 9 INJECTION INTRAMUSCULAR; INTRAVENOUS; SUBCUTANEOUS at 13:25

## 2023-03-20 RX ADMIN — CEFEPIME 100 MILLIGRAM(S): 1 INJECTION, POWDER, FOR SOLUTION INTRAMUSCULAR; INTRAVENOUS at 13:29

## 2023-03-20 RX ADMIN — Medication 1 GRAM(S): at 23:01

## 2023-03-20 RX ADMIN — MAGNESIUM OXIDE 400 MG ORAL TABLET 400 MILLIGRAM(S): 241.3 TABLET ORAL at 17:42

## 2023-03-20 RX ADMIN — SIMETHICONE 80 MILLIGRAM(S): 80 TABLET, CHEWABLE ORAL at 17:35

## 2023-03-20 RX ADMIN — SIMETHICONE 80 MILLIGRAM(S): 80 TABLET, CHEWABLE ORAL at 12:18

## 2023-03-20 RX ADMIN — Medication 250 MILLIGRAM(S): at 17:35

## 2023-03-20 RX ADMIN — MONTELUKAST 10 MILLIGRAM(S): 4 TABLET, CHEWABLE ORAL at 12:20

## 2023-03-20 RX ADMIN — Medication 50 MILLIGRAM(S): at 17:36

## 2023-03-20 RX ADMIN — Medication 3: at 18:02

## 2023-03-20 RX ADMIN — MAGNESIUM OXIDE 400 MG ORAL TABLET 400 MILLIGRAM(S): 241.3 TABLET ORAL at 12:30

## 2023-03-20 NOTE — PROGRESS NOTE ADULT - ASSESSMENT
82 yr male with history of Hypertension, COPD home oxygen dependent, CAD CABG , chronic left femor osteomyelitis since traumatic left femoral fracture in 1966 presents with new yellowish  drainage from left thigh. Cardiology consulted for clearance for RADHIKA villanueva.     -developed severe hypoxia last night  -unclear if this was related to acute pulmonary edema on the basis of his bp, or whether this was resp event related to choking as he describes  -seems plausible that this was all from choking, as he reports  -cxr from last night revealed incr markings, not convincing for acute pulm edema, but cannot exclude  -was treated with bipap and lasix, and he now seems back to his usual baseline  -cont suppl o2, with nocturnal bipap    -no acute ischemia  -known CAD s/p CABG  -with event last night, normal trop x 1   - Continue BB and statin   -please resume ASA when cleared by GI    - Previous TTE 12/21 showed overall normal systolic function with an estimated LVEF of 55-60%.   - Pt sees Dr. Rod for cardiology.   - yesterday on exam did not have evidence of any meaningful volume overload, and appeared comfortable laying flat.    -this am he appears to be at usual baseline  -not convinced that this was all vol ol based on his history, and seems likely that if pulm edema was present, was on basis of flash in setting of provoked severe sys htn/afterload, with transient hypoxia  -if had pulm edema from htn, his bp and his hypoxia have both resolved, and perhaps both provoked by the choking incident      -sr on monitor    - SCDs for DVT prophylaxis   - Monitor and replete lytes, keep K>4, Mg>2.  - Will continue to follow    Upon my evaluation, this patient is at high risk for imminent or life threatening deterioration due to resp failure, hypertension,  and other active medical issues which require my direct attention, intervention, and personal management.  I have personally spent >35 minutes  of critical care time exclusive of time spent on separate billing procedures. This includes review of laboratory data, radiology results, discussion with primary team\patient, and monitoring for potential decompensation Interventions were performed as documented above.

## 2023-03-20 NOTE — PROGRESS NOTE ADULT - SUBJECTIVE AND OBJECTIVE BOX
Interval events: no acute events overnight, no fevers or chills, no chest pain or dyspnea. Reports that he choked on his food yesterday evening, but now is resolved.    Review of Systems:  Constitutional: no fever, chills, fatigue  Neuro: no headache, numbness, weakness  Resp: no cough, wheezing, shortness of breath  CVS: no chest pain, palpitations, leg swelling  GI: no abdominal pain, nausea, vomiting, diarrhea   : no dysuria, frequency, incontinence  Skin: no itching, burning, rashes, or lesions   Msk: no joint pain or swelling  Psych: no depression, anxiety    T(F): 98.2 (03-20-23 @ 15:36), Max: 99.1 (03-19-23 @ 18:30)  HR: 113 (03-20-23 @ 15:00) (83 - 163)  BP: 130/60 (03-20-23 @ 15:00) (106/72 - 219/100)  RR: 20 (03-20-23 @ 15:00) (14 - 35)  SpO2: 91% (03-20-23 @ 15:00) (91% - 100%)    CAPILLARY BLOOD GLUCOSE    POCT Blood Glucose.: 236 mg/dL (20 Mar 2023 12:15)    I&O's Summary    19 Mar 2023 07:01  -  20 Mar 2023 07:00  --------------------------------------------------------  IN: 100 mL / OUT: 2550 mL / NET: -2450 mL      Physical Exam:     Gen: NAD; AAOx3  Neuro: CN II-XII grossly intact; moving all extremities   HEENT: NC/AT; EOMI; MMM  CV: normal S1 & S2; regular rate and rhythm   Pulm: clear to auscultation bilaterally   GI: soft; NT/ND  Ext: no edema; pulses intact  Skin: warm, well perfused; L thigh dressing c/d/i    Meds:  azithromycin   Tablet 250 milliGRAM(s) Oral <User Schedule>  cefepime   IVPB 2000 milliGRAM(s) IV Intermittent every 8 hours  furosemide    Tablet 20 milliGRAM(s) Oral daily  metoprolol tartrate 50 milliGRAM(s) Oral two times a day  atorvastatin 20 milliGRAM(s) Oral at bedtime  insulin glargine Injectable (LANTUS) 18 Unit(s) SubCutaneous at bedtime  insulin lispro (ADMELOG) corrective regimen sliding scale   SubCutaneous Before meals and at bedtime    albuterol/ipratropium for Nebulization 3 milliLiter(s) Nebulizer every 6 hours PRN  budesonide 160 MICROgram(s)/formoterol 4.5 MICROgram(s) Inhaler 2 Puff(s) Inhalation two times a day  montelukast 10 milliGRAM(s) Oral daily  sodium chloride 3%  Inhalation 4 milliLiter(s) Inhalation every 6 hours  tiotropium 2.5 MICROgram(s) Inhaler 2 Puff(s) Inhalation daily  pantoprazole    Tablet 40 milliGRAM(s) Oral every 12 hours  polyethylene glycol 3350 17 Gram(s) Oral daily  senna 2 Tablet(s) Oral at bedtime  simethicone 80 milliGRAM(s) Chew every 6 hours  sucralfate 1 Gram(s) Oral four times a day  tamsulosin 0.4 milliGRAM(s) Oral at bedtime  ascorbic acid 500 milliGRAM(s) Oral daily  cholecalciferol 400 Unit(s) Oral daily  cyanocobalamin 1000 MICROGram(s) Oral daily  dextrose 5%. 1000 milliLiter(s) IV Continuous <Continuous>  dextrose 5%. 1000 milliLiter(s) IV Continuous <Continuous>  magnesium oxide 400 milliGRAM(s) Oral three times a day with meals  multivitamin 1 Tablet(s) Oral daily  Nephro-carlos 1 Tablet(s) Oral daily  mepolizumab Subcutaneous Injectable 100 milliGRAM(s) SubCutaneous every 4 weeks  chlorhexidine 2% Cloths 1 Application(s) Topical <User Schedule>  hydrocortisone hemorrhoidal Suppository 1 Suppository(s) Rectal at bedtime  saccharomyces boulardii 250 milliGRAM(s) Oral two times a day                          10.4   14.78 )-----------( 294      ( 20 Mar 2023 05:56 )             34.5     03-20    140  |  104  |  22  ----------------------------<  260<H>  4.0   |  31  |  1.40<H>    Ca    9.2      20 Mar 2023 05:56  Phos  3.0     03-20  Mg     1.6     03-20    TPro  6.8  /  Alb  3.1<L>  /  TBili  0.3  /  DBili  x   /  AST  27  /  ALT  29  /  AlkPhos  101  03-19    CXR (3/19/23): +emphysema; no pulmonary edema, bilateral trace pleural effusions vs. pleural thickening    CENTRAL LINE: N    SHARMA: N    A-LINE: N    GLOBAL ISSUE/BEST PRACTICE:  Analgesia: n/a   Sedation: n/a  CAM-ICU: negative  HOB elevation: yes  Stress ulcer prophylaxis: PPI  VTE prophylaxis: SCD's  Glycemic control: yes  Nutrition: yes    CODE STATUS: full

## 2023-03-20 NOTE — PROGRESS NOTE ADULT - SUBJECTIVE AND OBJECTIVE BOX
Patient seen and examined  wife at bedside  upgraded to ICU  on lasix and cefepime  s/p bipap; now off bipap. lying flat  vitals stable    Review of Systems:  General:denies fever chills, headache, weakness  HEENT: denies blurry vision,diffculty swallowing, difficulty hearing, tinnitus  Cardiovascular: denies chest pain  ,palpitations  Pulmonary:denies shortness of breath, cough, wheezing, hemoptysis  Gastrointestinal: denies abdominal pain, constipation, diarrhea,nausea , vomiting, hematochezia  : denies hematuria, dysuria, or incontinence  Neurological: denies weakness, numbness , tingling, dizziness, tremors  MSK: denies muscle pain, difficulty ambulating, swelling, back pain  skin: denies skin rash, itching, burning, or  skin lesions  Psychiatrical: denies mood disturbances, anxierty, feeling depressed, depression , or difficulty sleeping    Objective:  Vitals  T(C): 36.5 (03-20-23 @ 08:14), Max: 37.3 (03-19-23 @ 18:30)  HR: 88 (03-20-23 @ 11:00) (83 - 163)  BP: 127/64 (03-20-23 @ 11:00) (106/72 - 219/100)  RR: 18 (03-20-23 @ 11:00) (14 - 35)  SpO2: 98% (03-20-23 @ 11:00) (91% - 100%)    Physical Exam:  General: comfortable, no acute distress  HEENT: Atraumatic, no LAD, trachea midline, PERRLA  Cardiovascular: normal s1s2, no murmurs, gallops or fricition rubs  Pulmonary: clear to ausculation Bilaterally, no wheezing , rhonchi  Gastrointestinal: soft non tender non distended, no masses felt, no organomegally  Muscloskeletal: no lower extremity edema, intact bilateral lower extremity pulses  Neurological: CN II-12 intact. No focal weakness  Psychiatrical: normal mood, cooperative  SKIN: no rash, lesions or ulcers    Labs:                          10.4   14.78 )-----------( 294      ( 20 Mar 2023 05:56 )             34.5     03-20    140  |  104  |  22  ----------------------------<  260<H>  4.0   |  31  |  1.40<H>    Ca    9.2      20 Mar 2023 05:56  Phos  3.0     03-20  Mg     1.6     03-20    TPro  6.8  /  Alb  3.1<L>  /  TBili  0.3  /  DBili  x   /  AST  27  /  ALT  29  /  AlkPhos  101  03-19    LIVER FUNCTIONS - ( 19 Mar 2023 18:50 )  Alb: 3.1 g/dL / Pro: 6.8 g/dL / ALK PHOS: 101 U/L / ALT: 29 U/L / AST: 27 U/L / GGT: x                 Active Medications  MEDICATIONS  (STANDING):  ascorbic acid 500 milliGRAM(s) Oral daily  atorvastatin 20 milliGRAM(s) Oral at bedtime  azithromycin   Tablet 250 milliGRAM(s) Oral <User Schedule>  budesonide 160 MICROgram(s)/formoterol 4.5 MICROgram(s) Inhaler 2 Puff(s) Inhalation two times a day  cefepime   IVPB 2000 milliGRAM(s) IV Intermittent every 8 hours  chlorhexidine 2% Cloths 1 Application(s) Topical <User Schedule>  cholecalciferol 400 Unit(s) Oral daily  cyanocobalamin 1000 MICROGram(s) Oral daily  dextrose 5%. 1000 milliLiter(s) (100 mL/Hr) IV Continuous <Continuous>  dextrose 5%. 1000 milliLiter(s) (50 mL/Hr) IV Continuous <Continuous>  dextrose 50% Injectable 25 Gram(s) IV Push once  dextrose 50% Injectable 12.5 Gram(s) IV Push once  dextrose 50% Injectable 25 Gram(s) IV Push once  furosemide    Tablet 20 milliGRAM(s) Oral daily  glucagon  Injectable 1 milliGRAM(s) IntraMuscular once  hydrocortisone hemorrhoidal Suppository 1 Suppository(s) Rectal at bedtime  insulin glargine Injectable (LANTUS) 18 Unit(s) SubCutaneous at bedtime  insulin lispro (ADMELOG) corrective regimen sliding scale   SubCutaneous Before meals and at bedtime  magnesium oxide 400 milliGRAM(s) Oral three times a day with meals  mepolizumab Subcutaneous Injectable 100 milliGRAM(s) SubCutaneous every 4 weeks  metoprolol tartrate 50 milliGRAM(s) Oral two times a day  montelukast 10 milliGRAM(s) Oral daily  multivitamin 1 Tablet(s) Oral daily  Nephro-carlos 1 Tablet(s) Oral daily  pantoprazole    Tablet 40 milliGRAM(s) Oral every 12 hours  polyethylene glycol 3350 17 Gram(s) Oral daily  saccharomyces boulardii 250 milliGRAM(s) Oral two times a day  senna 2 Tablet(s) Oral at bedtime  simethicone 80 milliGRAM(s) Chew every 6 hours  sodium chloride 3%  Inhalation 4 milliLiter(s) Inhalation every 6 hours  sucralfate 1 Gram(s) Oral four times a day  tamsulosin 0.4 milliGRAM(s) Oral at bedtime  tiotropium 2.5 MICROgram(s) Inhaler 2 Puff(s) Inhalation daily    MEDICATIONS  (PRN):  albuterol/ipratropium for Nebulization 3 milliLiter(s) Nebulizer every 6 hours PRN Shortness of Breath and/or Wheezing  dextrose Oral Gel 15 Gram(s) Oral once PRN Blood Glucose LESS THAN 70 milliGRAM(s)/deciliter     Patient seen and examined  wife at bedside  upgraded to ICU  on lasix and cefepime  s/p bipap; now off bipap. lying flat. patient reports he felt the cause was due choking on food  vitals stable    Review of Systems:  General:denies fever chills, headache, weakness  HEENT: denies blurry vision,diffculty swallowing, difficulty hearing, tinnitus  Cardiovascular: denies chest pain  ,palpitations  Pulmonary:denies shortness of breath, cough, wheezing, hemoptysis  Gastrointestinal: denies abdominal pain, constipation, diarrhea,nausea , vomiting, hematochezia  : denies hematuria, dysuria, or incontinence  Neurological: denies weakness, numbness , tingling, dizziness, tremors  MSK: denies muscle pain, difficulty ambulating, swelling, back pain  skin: denies skin rash, itching, burning, or  skin lesions  Psychiatrical: denies mood disturbances, anxierty, feeling depressed, depression , or difficulty sleeping    Objective:  Vitals  T(C): 36.5 (03-20-23 @ 08:14), Max: 37.3 (03-19-23 @ 18:30)  HR: 88 (03-20-23 @ 11:00) (83 - 163)  BP: 127/64 (03-20-23 @ 11:00) (106/72 - 219/100)  RR: 18 (03-20-23 @ 11:00) (14 - 35)  SpO2: 98% (03-20-23 @ 11:00) (91% - 100%)    Physical Exam:  General: comfortable, no acute distress  HEENT: Atraumatic, no LAD, trachea midline, PERRLA  Cardiovascular: normal s1s2, no murmurs, gallops or fricition rubs  Pulmonary: clear to ausculation Bilaterally, no wheezing , rhonchi  Gastrointestinal: soft non tender non distended, no masses felt, no organomegally  Muscloskeletal: no lower extremity edema, intact bilateral lower extremity pulses  Neurological: CN II-12 intact. No focal weakness  Psychiatrical: normal mood, cooperative  SKIN: no rash, lesions or ulcers    Labs:                          10.4   14.78 )-----------( 294      ( 20 Mar 2023 05:56 )             34.5     03-20    140  |  104  |  22  ----------------------------<  260<H>  4.0   |  31  |  1.40<H>    Ca    9.2      20 Mar 2023 05:56  Phos  3.0     03-20  Mg     1.6     03-20    TPro  6.8  /  Alb  3.1<L>  /  TBili  0.3  /  DBili  x   /  AST  27  /  ALT  29  /  AlkPhos  101  03-19    LIVER FUNCTIONS - ( 19 Mar 2023 18:50 )  Alb: 3.1 g/dL / Pro: 6.8 g/dL / ALK PHOS: 101 U/L / ALT: 29 U/L / AST: 27 U/L / GGT: x                 Active Medications  MEDICATIONS  (STANDING):  ascorbic acid 500 milliGRAM(s) Oral daily  atorvastatin 20 milliGRAM(s) Oral at bedtime  azithromycin   Tablet 250 milliGRAM(s) Oral <User Schedule>  budesonide 160 MICROgram(s)/formoterol 4.5 MICROgram(s) Inhaler 2 Puff(s) Inhalation two times a day  cefepime   IVPB 2000 milliGRAM(s) IV Intermittent every 8 hours  chlorhexidine 2% Cloths 1 Application(s) Topical <User Schedule>  cholecalciferol 400 Unit(s) Oral daily  cyanocobalamin 1000 MICROGram(s) Oral daily  dextrose 5%. 1000 milliLiter(s) (100 mL/Hr) IV Continuous <Continuous>  dextrose 5%. 1000 milliLiter(s) (50 mL/Hr) IV Continuous <Continuous>  dextrose 50% Injectable 25 Gram(s) IV Push once  dextrose 50% Injectable 12.5 Gram(s) IV Push once  dextrose 50% Injectable 25 Gram(s) IV Push once  furosemide    Tablet 20 milliGRAM(s) Oral daily  glucagon  Injectable 1 milliGRAM(s) IntraMuscular once  hydrocortisone hemorrhoidal Suppository 1 Suppository(s) Rectal at bedtime  insulin glargine Injectable (LANTUS) 18 Unit(s) SubCutaneous at bedtime  insulin lispro (ADMELOG) corrective regimen sliding scale   SubCutaneous Before meals and at bedtime  magnesium oxide 400 milliGRAM(s) Oral three times a day with meals  mepolizumab Subcutaneous Injectable 100 milliGRAM(s) SubCutaneous every 4 weeks  metoprolol tartrate 50 milliGRAM(s) Oral two times a day  montelukast 10 milliGRAM(s) Oral daily  multivitamin 1 Tablet(s) Oral daily  Nephro-carlos 1 Tablet(s) Oral daily  pantoprazole    Tablet 40 milliGRAM(s) Oral every 12 hours  polyethylene glycol 3350 17 Gram(s) Oral daily  saccharomyces boulardii 250 milliGRAM(s) Oral two times a day  senna 2 Tablet(s) Oral at bedtime  simethicone 80 milliGRAM(s) Chew every 6 hours  sodium chloride 3%  Inhalation 4 milliLiter(s) Inhalation every 6 hours  sucralfate 1 Gram(s) Oral four times a day  tamsulosin 0.4 milliGRAM(s) Oral at bedtime  tiotropium 2.5 MICROgram(s) Inhaler 2 Puff(s) Inhalation daily    MEDICATIONS  (PRN):  albuterol/ipratropium for Nebulization 3 milliLiter(s) Nebulizer every 6 hours PRN Shortness of Breath and/or Wheezing  dextrose Oral Gel 15 Gram(s) Oral once PRN Blood Glucose LESS THAN 70 milliGRAM(s)/deciliter

## 2023-03-20 NOTE — PROGRESS NOTE ADULT - ASSESSMENT
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 thigh infection/abscess, hospital course c/b RRT for acute hypoxic resp failure requiring transfer to MICU for possible APE vs ACOPDE    Neuro:   - Mental status at baseline.     Cardiac:   - Euvolemic on exam.  Continue home Lasix 20mg qd.   - h/o pAfib, continue metoprolol. Eliquis oh hold for GIB. Pending GI recs to re-start AC.   - h/o CAD. Continue statin. Pending GI recs to re-start ASA.     Resp:   - RR 2/2 Acute hypoxic resp failure 2/2 food stocked on throat, ?mild aspiration.  s/p Lasix 40mg IVP and Solumedrol. Pt clarify cause of episode today, and resolved.   - Normal O2 sat on RA.   - CXR seen with no significant changes from previous.   - DC steroid.   - Continue Spiriva, azithro ppx 3 d/wk, Symbicort, Duoneb and montelukast    - continue BiPAP at night time    GI:   - PO diet   - Previous GIB. EGD on 3/13: showing gastric ulcer and colonoscopy on 3/17 showing cecal polyp/avm s/p bx and clip.  - Continue sucralfate and PPI q12h.      Renal:    - Overall stable renal function, normal lytes  - Good UO.     ID:   - Chronic OM/abscess, on IV cefepime  - Leukocytosis likely reactive, No fevers  - IR consult for PICC placement.     Endo:   - T2DM, A1c 8.0  - Glucose up to 245, but Lantus was increased since last night 15 to 18U.   - Cont ISS and FS.   - Endo consult for discharge meds.     Dispo:   - Stable to continue management on medicine floor.

## 2023-03-20 NOTE — PROGRESS NOTE ADULT - SUBJECTIVE AND OBJECTIVE BOX
[facility]  Rich Sky PA-C  [address] [place] [address]  [phone]      INTERVAL HPI/OVERNIGHT EVENTS:  Pt s/e in ICU with wife at bedside  Reports no new GI events; states no BM since colonoscopy    MEDICATIONS  (STANDING):  ascorbic acid 500 milliGRAM(s) Oral daily  atorvastatin 20 milliGRAM(s) Oral at bedtime  azithromycin   Tablet 250 milliGRAM(s) Oral <User Schedule>  budesonide 160 MICROgram(s)/formoterol 4.5 MICROgram(s) Inhaler 2 Puff(s) Inhalation two times a day  cefepime   IVPB 2000 milliGRAM(s) IV Intermittent every 8 hours  chlorhexidine 2% Cloths 1 Application(s) Topical <User Schedule>  cholecalciferol 400 Unit(s) Oral daily  cyanocobalamin 1000 MICROGram(s) Oral daily  dextrose 5%. 1000 milliLiter(s) (50 mL/Hr) IV Continuous <Continuous>  dextrose 5%. 1000 milliLiter(s) (100 mL/Hr) IV Continuous <Continuous>  dextrose 50% Injectable 25 Gram(s) IV Push once  dextrose 50% Injectable 12.5 Gram(s) IV Push once  dextrose 50% Injectable 25 Gram(s) IV Push once  furosemide    Tablet 20 milliGRAM(s) Oral daily  glucagon  Injectable 1 milliGRAM(s) IntraMuscular once  hydrocortisone hemorrhoidal Suppository 1 Suppository(s) Rectal at bedtime  insulin glargine Injectable (LANTUS) 18 Unit(s) SubCutaneous at bedtime  insulin lispro (ADMELOG) corrective regimen sliding scale   SubCutaneous Before meals and at bedtime  magnesium oxide 400 milliGRAM(s) Oral three times a day with meals  mepolizumab Subcutaneous Injectable 100 milliGRAM(s) SubCutaneous every 4 weeks  metoprolol tartrate 50 milliGRAM(s) Oral two times a day  montelukast 10 milliGRAM(s) Oral daily  multivitamin 1 Tablet(s) Oral daily  Nephro-carlos 1 Tablet(s) Oral daily  pantoprazole    Tablet 40 milliGRAM(s) Oral every 12 hours  polyethylene glycol 3350 17 Gram(s) Oral daily  saccharomyces boulardii 250 milliGRAM(s) Oral two times a day  senna 2 Tablet(s) Oral at bedtime  simethicone 80 milliGRAM(s) Chew every 6 hours  sodium chloride 3%  Inhalation 4 milliLiter(s) Inhalation every 6 hours  sucralfate 1 Gram(s) Oral four times a day  tamsulosin 0.4 milliGRAM(s) Oral at bedtime  tiotropium 2.5 MICROgram(s) Inhaler 2 Puff(s) Inhalation daily    MEDICATIONS  (PRN):  albuterol/ipratropium for Nebulization 3 milliLiter(s) Nebulizer every 6 hours PRN Shortness of Breath and/or Wheezing  dextrose Oral Gel 15 Gram(s) Oral once PRN Blood Glucose LESS THAN 70 milliGRAM(s)/deciliter      Allergies    No Known Allergies        PHYSICAL EXAM:   Vital Signs:  Vital Signs Last 24 Hrs  T(C): 36.5 (20 Mar 2023 08:14), Max: 37.3 (19 Mar 2023 18:30)  T(F): 97.7 (20 Mar 2023 08:14), Max: 99.1 (19 Mar 2023 18:30)  HR: 86 (20 Mar 2023 10:00) (83 - 163)  BP: 125/65 (20 Mar 2023 10:00) (106/72 - 219/100)  BP(mean): 87 (20 Mar 2023 10:00) (84 - 144)  RR: 15 (20 Mar 2023 10:00) (14 - 35)  SpO2: 98% (20 Mar 2023 10:00) (91% - 100%)    Parameters below as of 20 Mar 2023 08:00  Patient On (Oxygen Delivery Method): room air      Daily Height in cm: 180.34 (19 Mar 2023 18:30)    Daily Weight in k.8 (20 Mar 2023 05:00)    GENERAL:  Appears stated age  HEENT:  NC/AT  CHEST:  Full & symmetric excursion  HEART:  Regular rhythm  ABDOMEN:  Soft, non-tender, non-distended  EXTEREMITIES:  no cyanosis  SKIN:  No rash  NEURO:  Alert      LABS:                        10.4   14.78 )-----------( 294      ( 20 Mar 2023 05:56 )             34.5     03-20    140  |  104  |  22  ----------------------------<  260<H>  4.0   |  31  |  1.40<H>    Ca    9.2      20 Mar 2023 05:56  Phos  3.0       Mg     1.6         TPro  6.8  /  Alb  3.1<L>  /  TBili  0.3  /  DBili  x   /  AST  27  /  ALT  29  /  AlkPhos  101

## 2023-03-20 NOTE — PROGRESS NOTE ADULT - ASSESSMENT
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 thigh infection/abscess, hospital courser c/b RRT for acute hypoxic resp failure requiring transfer to MICU for possible APE vs ACOPDE    -Neuro: MS stable pt currently protecting airway monitor close at risk for intuabtion if resp status does not improve  -Cardiac: Tachycardic likely in respond to resp distress cont to to monitor  -Resp: Acute hypoxic resp failure suspected to be 2/2 to APE vs ACOPDE plan for bipap support, given IV lasix and started on lasix, cont duonebs and start steroids, will titrate bipap setting to maintain o2 >90% if resp status does not improve will consider intubation   -GI: NPO while on bipap, GIB w/ ABLA now s/p 6U of PRBC s/p EGD and colonscopy cont GI ppx w/ protonix/carafate and bowel regimen  -Renal:  Renal function stable cont to monitor along with lytes, hypoMag on mag supplament if no improvement switch to IV mag after repeat labs in the AM  -ID: Chronic OM/abscess on IV cefepime  -Endo: DM cont ISS and monitor FS q6h given NPO status  -Dispo: Pt remains in the MICU currently full code, GOC d/w pt/wife tonight at bedside and is full code, dispo penidng ICU course

## 2023-03-20 NOTE — PROGRESS NOTE ADULT - ASSESSMENT
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     OM  COURTNEY  Asthma  COPD  HTN  CAD  CABG hx    rrt reviewed  suspect Pulm edema - CHF ex  NIV use  Diuresis  Doubt COPD ex - on steroids - nebs -   on emp ABX  wound - skin care  I and O  monitor VS and HD  ICU care in progress

## 2023-03-20 NOTE — PROGRESS NOTE ADULT - SUBJECTIVE AND OBJECTIVE BOX
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 thigh infection/abscess.      24 hour events: hospital courser c/b RRT earlier today 2/2 to increased secretions and acute hypoxic resp failure suspected to be 2/2 to APE for which pt was started on bipap, given IV lasix and transferred to MICU for further treatment and management. Upon my arrival this eveing pt remained in significant distress, bipap setting were increased to 18/8 which pt is at home at night, pt was given IV steroids for suspected ACOPD and given duonebs with improvement in HD and respiratory status.    Review of Systems:  Constitutional: No fever, chills, fatigue  Neuro: No headache, numbness, weakness  Resp: No cough, wheezing, shortness of breath  CVS: No chest pain, palpitations, leg swelling  GI: No abdominal pain, nausea, vomiting, diarrhea   : No dysuria, frequency, incontinence  Skin: No itching, burning, rashes, or lesions   Msk: No joint pain or swelling  Psych: No depression, anxiety, mood swings    T(F): 98.4 (03-19-23 @ 21:52), Max: 99.1 (03-19-23 @ 18:30)  HR: 125 (03-19-23 @ 21:30) (76 - 163)  BP: 118/74 (03-19-23 @ 21:30) (106/72 - 219/100)  RR: 28 (03-19-23 @ 21:30) (16 - 32)  SpO2: 98% (03-19-23 @ 21:30) (91% - 100%)  Wt(kg): --      03-18-23 @ 07:01  -  03-19-23 @ 07:00  --------------------------------------------------------  IN: 200 mL / OUT: 0 mL / NET: 200 mL    03-19-23 @ 07:01  -  03-19-23 @ 21:54  --------------------------------------------------------  IN: 0 mL / OUT: 1150 mL / NET: -1150 mL        CAPILLARY BLOOD GLUCOSE      POCT Blood Glucose.: 188 mg/dL (19 Mar 2023 21:47)      I&O's Summary    18 Mar 2023 07:01  -  19 Mar 2023 07:00  --------------------------------------------------------  IN: 200 mL / OUT: 0 mL / NET: 200 mL    19 Mar 2023 07:01  -  19 Mar 2023 21:54  --------------------------------------------------------  IN: 0 mL / OUT: 1150 mL / NET: -1150 mL        Physical Exam:   Gen: in resp distress  Neuro: AAOx3  HEENT: NC/AT  Resp: good air entry b/l, no wheezing or crackles  CVS: +tachycardic  Abd: BSx4, soft, nt/nd  Ext: no edema   Skin: warm/dry    Meds:  cefepime   IVPB IV Intermittent    furosemide   Injectable IV Push  metoprolol tartrate Oral    atorvastatin Oral  dextrose 50% Injectable IV Push  dextrose 50% Injectable IV Push  dextrose 50% Injectable IV Push  dextrose Oral Gel Oral PRN  glucagon  Injectable IntraMuscular  insulin glargine Injectable (LANTUS) SubCutaneous  insulin lispro (ADMELOG) corrective regimen sliding scale SubCutaneous  insulin lispro (ADMELOG) corrective regimen sliding scale SubCutaneous  methylPREDNISolone sodium succinate Injectable IV Push    acetylcysteine 10%  Inhalation Inhalation  albuterol/ipratropium for Nebulization Nebulizer PRN  budesonide 160 MICROgram(s)/formoterol 4.5 MICROgram(s) Inhaler Inhalation  levalbuterol Inhalation Inhalation  montelukast Oral  sodium chloride 3%  Inhalation Inhalation  tiotropium 2.5 MICROgram(s) Inhaler Inhalation          pantoprazole    Tablet Oral  polyethylene glycol 3350 Oral  senna Oral  simethicone Chew  sucralfate Oral    tamsulosin Oral    ascorbic acid Oral  cholecalciferol Oral  cyanocobalamin Oral  dextrose 5%. IV Continuous  dextrose 5%. IV Continuous  magnesium oxide Oral  multivitamin Oral  Nephro-carlos Oral    mepolizumab Subcutaneous Injectable SubCutaneous    chlorhexidine 2% Cloths Topical  hydrocortisone hemorrhoidal Suppository Rectal    saccharomyces boulardii Oral                            10.7   15.73 )-----------( 325      ( 19 Mar 2023 18:50 )             36.1       03-19    141  |  102  |  17  ----------------------------<  164<H>  3.5   |  33<H>  |  1.30    Ca    9.3      19 Mar 2023 18:50  Phos  3.4     03-19  Mg     1.5     03-19    TPro  6.8  /  Alb  3.1<L>  /  TBili  0.3  /  DBili  x   /  AST  27  /  ALT  29  /  AlkPhos  101  03-19        Radiology:     < from: Xray Chest 1 View-PORTABLE IMMEDIATE (Xray Chest 1 View-PORTABLE IMMEDIATE .) (03.17.23 @ 12:23) >    ACC: [id number] EXAM:  XR CHEST PORTABLE IMMED 1V   ORDERED BY: SALAZAR HANNAH     PROCEDURE DATE:  03/17/2023          INTERPRETATION:  INDICATIONS: 82-year-old male with cellulitis of left   lower limb    Prior examination for comparison: 3/7/2023    Technique: AP view of chest    Findings:    Sternotomy wires are present. The lungs are clear. There are no pleural   effusions. The cardiomediastinal silhouette is normal. Bones are grossly   normal.    IMPRESSION: No evidence of acute cardiopulmonary disease.    --- End of Report ---      ZARINA TEMPLE MD; Attending Interventional Radiologist  This document has been electronically signed. Mar 18 2023  9:15AM    < end of copied text >        < from: US Extremity Nonvasc Limited, Left (03.16.23 @ 14:56) >    ACC: [id number] EXAM:  US NONVASC EXT LTD LT   ORDERED BY: SALAZAR HANNAH     PROCEDURE DATE:  03/16/2023          INTERPRETATION:  Clinical information: Left thigh abscess. Osteomyelitis.    Limited targeted ultrasound examination of the left upper thigh at the   area of concern is performed with gray scale and color flow technique.    There is an approximately 4.7 x 2.4 x 3.8 cm irregular margin complex   hypoechoic collection, without vascularity.  This is approximately 2.2 cm deep from the skin surface.    IMPRESSION:    Hypoechoic collection as discussed which may reflect abscess.  MRI is more sensitive for the evaluation of soft tissue abscess.    --- End of Report ---      YUE COLEY MD; Attending Radiologist  This document has been electronically signed. Mar 16 2023  3:14PM    < end of copied text >        Bedside ultrasound: bedside POCUS Lungs: Perdomintaly A-lined anterior and some b-lines at the bases, no pleural effusions, POCUS Heart: IVC appx 2cm with good respiratory variation        CODE STATUS: FULL CODE    CRITICAL CARE TIME SPENT: 45 MINS  (Assessing presenting problems of acute illness, which pose high probability of life threatening deterioration or end organ damage/dysfunction, as well as medical decision making including initiating plan of care, reviewing data, reviewing radiologic exams, discussing with multidisciplinary team,  discussing goals of care with patient/family, and writing this note.  Non-inclusive of procedures performed)

## 2023-03-20 NOTE — PROGRESS NOTE ADULT - ASSESSMENT
anemia  GIB    s/p egd 3/13 showing gastric ulcer; gastritis; gastric ulcer; hiatal hernia  s/p colonoscopy with bx and clip 3/17; cecal polyp; cecal avm   monitor cbc  if rebleed capsule endoscopy as outpatient  hemorrhoidal treatment  Keep Hgb >8  d/w pt and wife at bedside  Will follow    I reviewed the overnight course of events on the unit, re-confirming the patient history. I discussed the care with the patient and their family  Differential diagnosis and plan of care discussed with patient after the evaluation  50 minutes spent on total encounter of which more than fifty percent of the encounter was spent counseling and/or coordinating care by the attending physician.  Advanced care planning was discussed with patient and family.  Advanced care planning forms were reviewed and discussed.  Risks, benefits and alternatives of gastroenterologic procedures were discussed in detail and all questions were answered.

## 2023-03-20 NOTE — PROGRESS NOTE ADULT - ASSESSMENT
82 year old male with history of CAD s/p CABG, HLD, HTN, DM 2, COPD on home O2, PVD/LE stents, remote hx of L femoral fx with complicated course/multiple procedures/chronic OM sent by ID with draining sinus from left thigh.     1) Acute on chronic hypoxemic respiratory failure  - likely volume overload from transfusion  - Continue Lasix   - Weaned off supplemental oxygen    2) Chronic L femur OM/L thigh abscess   - IR was discussed and no drainage was recommended  - Continue Cefepime   - Will likely need PICC line next week for long term antibiotics  - ID follow up noted     3) Acute Blood Loss Anemia   - Likely due to GIB  - s/p 6 PRBCs  - s/p EGD on 3/13: showing gastric ulcer; gastritis; gastric ulcer; hiatal hernia  - s/p colonoscopy on 3/17: showing cecal polyp/avm s/p bx and clip   - Continue Protonix and Carafate   - Added Senna, Miralax and Anusol Suppositories for hemorrhoids   - GI follow up noted     4) History of Paroxysmal Atrial Tachycardia   - Continue Metoprolol  - Eliquis on hold, resume once cleared by GI    5) CAD / PVD  - Antiplatelets on hold, resume once cleared by GI  - Continue Lipitor and Metoprolol   - Cardio follow up noted     6) DM 2  - A1c 8  - Accu checks and ISS  - Increase Lantus to 18 units      7) Asthma / COPD / COURTNEY  - Continue Symbicort and Spiriva   - On Nucala every 4 weeks   - Nocturnal NIV  - Pulmonary follow up noted     8) Hypokalemia / Hypomagnesemia   - Replete    DVT Prophylaxis -- Venodyne     Dispo: now in ICU level of care :   Updated patient's wife at bedside.

## 2023-03-20 NOTE — PROGRESS NOTE ADULT - SUBJECTIVE AND OBJECTIVE BOX
Date/Time Patient Seen:  		  Referring MD:   Data Reviewed	       Patient is a 82y old  Male who presents with a chief complaint of Left  thigh draining sinus (20 Mar 2023 00:06)      Subjective/HPI     PAST MEDICAL & SURGICAL HISTORY:  Diabetes Mellitus, Type II    CAD (Coronary Artery Disease)  s/p 3v CABG 2004; stents placed in [place] in 2019    Dyslipidemia    Asymptomatic Peripheral Vascular Disease    Osteomyelitis    COPD (chronic obstructive pulmonary disease)  on 2L at home and BiPAP at night; intubated 6/18    Diabetes mellitus    Hypertension    PVD (peripheral vascular disease)    History of PAT (paroxysmal atrial tachycardia)    Asthma with COPD    BPH (benign prostatic hyperplasia)    Acute osteomyelitis    CABG (Coronary Artery Bypass Graft)  2004    Compound fracture  left leg    S/P primary angioplasty with coronary stent    H/O drainage of abscess  Left femur 12/2021          Medication list         MEDICATIONS  (STANDING):  ascorbic acid 500 milliGRAM(s) Oral daily  atorvastatin 20 milliGRAM(s) Oral at bedtime  budesonide 160 MICROgram(s)/formoterol 4.5 MICROgram(s) Inhaler 2 Puff(s) Inhalation two times a day  cefepime   IVPB 2000 milliGRAM(s) IV Intermittent every 8 hours  chlorhexidine 2% Cloths 1 Application(s) Topical <User Schedule>  cholecalciferol 400 Unit(s) Oral daily  cyanocobalamin 1000 MICROGram(s) Oral daily  dextrose 5%. 1000 milliLiter(s) (50 mL/Hr) IV Continuous <Continuous>  dextrose 5%. 1000 milliLiter(s) (100 mL/Hr) IV Continuous <Continuous>  dextrose 50% Injectable 25 Gram(s) IV Push once  dextrose 50% Injectable 12.5 Gram(s) IV Push once  dextrose 50% Injectable 25 Gram(s) IV Push once  furosemide   Injectable 40 milliGRAM(s) IV Push two times a day  glucagon  Injectable 1 milliGRAM(s) IntraMuscular once  hydrocortisone hemorrhoidal Suppository 1 Suppository(s) Rectal at bedtime  insulin glargine Injectable (LANTUS) 18 Unit(s) SubCutaneous at bedtime  insulin lispro (ADMELOG) corrective regimen sliding scale   SubCutaneous three times a day before meals  insulin lispro (ADMELOG) corrective regimen sliding scale   SubCutaneous at bedtime  magnesium oxide 400 milliGRAM(s) Oral three times a day with meals  mepolizumab Subcutaneous Injectable 100 milliGRAM(s) SubCutaneous every 4 weeks  methylPREDNISolone sodium succinate Injectable 40 milliGRAM(s) IV Push every 8 hours  metoprolol tartrate 50 milliGRAM(s) Oral two times a day  montelukast 10 milliGRAM(s) Oral daily  multivitamin 1 Tablet(s) Oral daily  Nephro-carlos 1 Tablet(s) Oral daily  pantoprazole    Tablet 40 milliGRAM(s) Oral every 12 hours  polyethylene glycol 3350 17 Gram(s) Oral daily  saccharomyces boulardii 250 milliGRAM(s) Oral two times a day  senna 2 Tablet(s) Oral at bedtime  simethicone 80 milliGRAM(s) Chew every 6 hours  sodium chloride 3%  Inhalation 4 milliLiter(s) Inhalation every 6 hours  sucralfate 1 Gram(s) Oral four times a day  tamsulosin 0.4 milliGRAM(s) Oral at bedtime  tiotropium 2.5 MICROgram(s) Inhaler 2 Puff(s) Inhalation daily    MEDICATIONS  (PRN):  albuterol/ipratropium for Nebulization 3 milliLiter(s) Nebulizer every 6 hours PRN Shortness of Breath and/or Wheezing  dextrose Oral Gel 15 Gram(s) Oral once PRN Blood Glucose LESS THAN 70 milliGRAM(s)/deciliter         Vitals log        ICU Vital Signs Last 24 Hrs  T(C): 37 (20 Mar 2023 03:45), Max: 37.3 (19 Mar 2023 18:30)  T(F): 98.6 (20 Mar 2023 03:45), Max: 99.1 (19 Mar 2023 18:30)  HR: 90 (20 Mar 2023 05:00) (85 - 163)  BP: 141/65 (20 Mar 2023 05:00) (106/72 - 219/100)  BP(mean): 94 (20 Mar 2023 05:00) (84 - 144)  ABP: --  ABP(mean): --  RR: 16 (20 Mar 2023 05:00) (16 - 32)  SpO2: 100% (20 Mar 2023 05:00) (91% - 100%)    O2 Parameters below as of 19 Mar 2023 21:59  Patient On (Oxygen Delivery Method): BiPAP/CPAP,30                 Input and Output:  I&O's Detail    18 Mar 2023 07:01  -  19 Mar 2023 07:00  --------------------------------------------------------  IN:    IV PiggyBack: 200 mL  Total IN: 200 mL    OUT:  Total OUT: 0 mL    Total NET: 200 mL      19 Mar 2023 07:01  -  20 Mar 2023 05:32  --------------------------------------------------------  IN:    IV PiggyBack: 100 mL  Total IN: 100 mL    OUT:    Voided (mL): 1150 mL  Total OUT: 1150 mL    Total NET: -1050 mL          Lab Data                        10.7   15.73 )-----------( 325      ( 19 Mar 2023 18:50 )             36.1     03-19    141  |  102  |  17  ----------------------------<  164<H>  3.5   |  33<H>  |  1.30    Ca    9.3      19 Mar 2023 18:50  Phos  3.4     03-19  Mg     1.5     03-19    TPro  6.8  /  Alb  3.1<L>  /  TBili  0.3  /  DBili  x   /  AST  27  /  ALT  29  /  AlkPhos  101  03-19            Review of Systems	      Objective     Physical Examination    heart s1s2  lung dec BS  head nc      Pertinent Lab findings & Imaging      Eliecer:  NO   Adequate UO     I&O's Detail    18 Mar 2023 07:01  -  19 Mar 2023 07:00  --------------------------------------------------------  IN:    IV PiggyBack: 200 mL  Total IN: 200 mL    OUT:  Total OUT: 0 mL    Total NET: 200 mL      19 Mar 2023 07:01  -  20 Mar 2023 05:32  --------------------------------------------------------  IN:    IV PiggyBack: 100 mL  Total IN: 100 mL    OUT:    Voided (mL): 1150 mL  Total OUT: 1150 mL    Total NET: -1050 mL               Discussed with:     Cultures:	        Radiology

## 2023-03-20 NOTE — PROGRESS NOTE ADULT - SUBJECTIVE AND OBJECTIVE BOX
[facility]    Génesis Villavicencio, [place], Kumar Hodges      [phone]    CHIEF COMPLAINT: Patient is a 82y old  Male who presents with a chief complaint of Left  thigh draining sinus (20 Mar 2023 05:32)      Follow Up: resp failure, cad    Interim history: rrt last night for hypoxia, hypertension. reports that he choked on a piece of meat, and could not catch his breath.  was evaluated by icu and felt to be in acute on chronic hypoxic resp failure on the basis of pulm edema, in setting of excess volume and severe sys htn. given bipap and diuresed.  This am he feels back to his usual baesline.    MEDICATIONS  (STANDING):  ascorbic acid 500 milliGRAM(s) Oral daily  atorvastatin 20 milliGRAM(s) Oral at bedtime  budesonide 160 MICROgram(s)/formoterol 4.5 MICROgram(s) Inhaler 2 Puff(s) Inhalation two times a day  cefepime   IVPB 2000 milliGRAM(s) IV Intermittent every 8 hours  chlorhexidine 2% Cloths 1 Application(s) Topical <User Schedule>  cholecalciferol 400 Unit(s) Oral daily  cyanocobalamin 1000 MICROGram(s) Oral daily  dextrose 5%. 1000 milliLiter(s) (50 mL/Hr) IV Continuous <Continuous>  dextrose 5%. 1000 milliLiter(s) (100 mL/Hr) IV Continuous <Continuous>  dextrose 50% Injectable 25 Gram(s) IV Push once  dextrose 50% Injectable 12.5 Gram(s) IV Push once  dextrose 50% Injectable 25 Gram(s) IV Push once  furosemide   Injectable 40 milliGRAM(s) IV Push two times a day  glucagon  Injectable 1 milliGRAM(s) IntraMuscular once  hydrocortisone hemorrhoidal Suppository 1 Suppository(s) Rectal at bedtime  insulin glargine Injectable (LANTUS) 18 Unit(s) SubCutaneous at bedtime  insulin lispro (ADMELOG) corrective regimen sliding scale   SubCutaneous every 6 hours  magnesium oxide 400 milliGRAM(s) Oral three times a day with meals  mepolizumab Subcutaneous Injectable 100 milliGRAM(s) SubCutaneous every 4 weeks  methylPREDNISolone sodium succinate Injectable 40 milliGRAM(s) IV Push every 8 hours  metoprolol tartrate 50 milliGRAM(s) Oral two times a day  montelukast 10 milliGRAM(s) Oral daily  multivitamin 1 Tablet(s) Oral daily  Nephro-carlos 1 Tablet(s) Oral daily  pantoprazole    Tablet 40 milliGRAM(s) Oral every 12 hours  polyethylene glycol 3350 17 Gram(s) Oral daily  saccharomyces boulardii 250 milliGRAM(s) Oral two times a day  senna 2 Tablet(s) Oral at bedtime  simethicone 80 milliGRAM(s) Chew every 6 hours  sodium chloride 3%  Inhalation 4 milliLiter(s) Inhalation every 6 hours  sucralfate 1 Gram(s) Oral four times a day  tamsulosin 0.4 milliGRAM(s) Oral at bedtime  tiotropium 2.5 MICROgram(s) Inhaler 2 Puff(s) Inhalation daily    MEDICATIONS  (PRN):  albuterol/ipratropium for Nebulization 3 milliLiter(s) Nebulizer every 6 hours PRN Shortness of Breath and/or Wheezing  dextrose Oral Gel 15 Gram(s) Oral once PRN Blood Glucose LESS THAN 70 milliGRAM(s)/deciliter      REVIEW OF SYSTEMS:  eye, ent, GI, , allergic, dermatologic, musculoskeletal and neurologic are negative except as described above    Vital Signs Last 24 Hrs  T(C): 36.5 (20 Mar 2023 08:14), Max: 37.3 (19 Mar 2023 18:30)  T(F): 97.7 (20 Mar 2023 08:14), Max: 99.1 (19 Mar 2023 18:30)  HR: 83 (20 Mar 2023 08:00) (83 - 163)  BP: 131/66 (20 Mar 2023 08:00) (106/72 - 219/100)  BP(mean): 92 (20 Mar 2023 08:00) (84 - 144)  RR: 17 (20 Mar 2023 08:00) (16 - 35)  SpO2: 95% (20 Mar 2023 08:00) (91% - 100%)    Parameters below as of 20 Mar 2023 08:00  Patient On (Oxygen Delivery Method): room air        I&O's Summary    19 Mar 2023 07:01  -  20 Mar 2023 07:00  --------------------------------------------------------  IN: 100 mL / OUT: 2550 mL / NET: -2450 mL        Telemetry past 24h: st -> sr    PHYSICAL EXAM:    Constitutional: obese, NAD   HEENT:  MMM, sclerae anicteric, conjunctivae clear, no oral cyanosis.  Pulmonary: Non-labored, breath sounds are clear bilaterally, No wheezing, rales or rhonchi  Cardiovascular: Regular, S1 and S2.  No murmur.  No rubs, gallops or clicks  Gastrointestinal: Bowel Sounds present, soft, nontender.   Lymph: min to mild peripheral edema.   Neurological: Alert, no focal deficits  Skin: No rashes.  Psych:  Mood & affect appropriate    LABS: All Labs Reviewed:                        10.4   14.78 )-----------( 294      ( 20 Mar 2023 05:56 )             34.5                         10.7   15.73 )-----------( 325      ( 19 Mar 2023 18:50 )             36.1                         9.3    7.42  )-----------( 283      ( 19 Mar 2023 06:05 )             30.4     20 Mar 2023 05:56    140    |  104    |  22     ----------------------------<  260    4.0     |  31     |  1.40   19 Mar 2023 18:50    141    |  102    |  17     ----------------------------<  164    3.5     |  33     |  1.30   19 Mar 2023 06:05    142    |  107    |  16     ----------------------------<  143    3.5     |  32     |  1.10     Ca    9.2        20 Mar 2023 05:56  Ca    9.3        19 Mar 2023 18:50  Ca    8.9        19 Mar 2023 06:05  Phos  3.0       20 Mar 2023 05:56  Phos  3.4       19 Mar 2023 18:50  Mg     1.6       20 Mar 2023 05:56  Mg     1.5       19 Mar 2023 18:50  Mg     1.4       19 Mar 2023 06:05    TPro  6.8    /  Alb  3.1    /  TBili  0.3    /  DBili  x      /  AST  27     /  ALT  29     /  AlkPhos  101    19 Mar 2023 18:50  TPro  5.4    /  Alb  2.5    /  TBili  0.3    /  DBili  x      /  AST  18     /  ALT  24     /  AlkPhos  76     19 Mar 2023 06:05          Blood Culture:         RADIOLOGY:    EKG:    Echo:

## 2023-03-20 NOTE — PROGRESS NOTE ADULT - SUBJECTIVE AND OBJECTIVE BOX
[facility]  INFECTIOUS DISEASES   [address] [place] [address]  Tel: [phone]     Fax: [phone]  ========================================================  MD Noman Perry Kaushal, MD Cho, Michelle, MD Sunjit, Jaspal, MD  ========================================================    [id number]  [facility]     Follow up: left thigh abscess and OM    No fever, some discharge from left thigh.  Had blood in stool and drop in H/H so had transfusion multiple times, and EGD on 3/13 showed gastritis and peptic ulcer.  Colonoscopy negative for active bleeding on 3/17. Now H/H stable.  Was transferred to ICU for monitoring after aspirating food during meal. Now doing well, not on o2.     PAST MEDICAL & SURGICAL HISTORY:  Diabetes Mellitus, Type II  CAD (Coronary Artery Disease)  s/p 3v CABG ; stents placed in [place] in   Dyslipidemia  Osteomyelitis  COPD (chronic obstructive pulmonary disease)  on 2L at home and BiPAP at night; intubated   Hypertension  PVD (peripheral vascular disease)  History of PAT (paroxysmal atrial tachycardia)  Asthma with COPD  BPH (benign prostatic hyperplasia)  Acute osteomyelitis  CABG (Coronary Artery Bypass Graft)    Compound fracture  left leg  S/P primary angioplasty with coronary stent  H/O drainage of abscess  Left femur 2021    Social Hx: No current smoking, ETOH or drugs     FAMILY HISTORY:  Family history of diabetes mellitus (Sibling)    Family hx of lung cancer  brother,  age 82, used to smoke with pt    Allergies  No Known Allergies    Intolerances  shellfish (Nausea)    Antibiotics:  Ciprofloxacin      REVIEW OF SYSTEMS:  CONSTITUTIONAL:  No Fever or chills  HEENT:  No diplopia or blurred vision.  No sore throat or runny nose.  CARDIOVASCULAR:  No chest pain or SOB.  RESPIRATORY:  No cough, shortness of breath, PND or orthopnea.  GASTROINTESTINAL:  No nausea, vomiting or diarrhea.  GENITOURINARY:  No dysuria, frequency or urgency. No Blood in urine  MUSCULOSKELETAL:  left thigh pain and drainage   SKIN:  No change in skin, hair or nails.  NEUROLOGIC:  No paresthesias or weakness.  PSYCHIATRIC:  No disorder of thought or mood.  ENDOCRINE:  No heat or cold intolerance, polyuria or polydipsia.  HEMATOLOGICAL:  No easy bruising or bleeding.     Physical Exam:  Vital Signs Last 24 Hrs  T(C): 36.5 (20 Mar 2023 11:59), Max: 37.3 (19 Mar 2023 18:30)  T(F): 97.7 (20 Mar 2023 11:59), Max: 99.1 (19 Mar 2023 18:30)  HR: 84 (20 Mar 2023 12:00) (83 - 163)  BP: 140/64 (20 Mar 2023 12:00) (106/72 - 219/100)  BP(mean): 92 (20 Mar 2023 12:00) (84 - 144)  RR: 20 (20 Mar 2023 12:00) (14 - 35)  SpO2: 97% (20 Mar 2023 12:00) (91% - 100%)  Parameters below as of 20 Mar 2023 08:00  Patient On (Oxygen Delivery Method): room air  GEN: NAD  HEENT: normocephalic and atraumatic. EOMI. PERRL.    NECK: Supple.  No lymphadenopathy   LUNGS: poor air movement   HEART: Regular rate and rhythm   ABDOMEN: Soft, nontender, and nondistended.  Positive bowel sounds.    : No CVA tenderness  EXTREMITIES: left thigh with scar in lateral side with fluctuation and small opening with yellow discharge   NEUROLOGIC: grossly intact.  PSYCHIATRIC: Appropriate affect .  SKIN: No rash     Labs:                        10.4   14.78 )-----------( 294      ( 20 Mar 2023 05:56 )             34.5     03-20    140  |  104  |  22  ----------------------------<  260<H>  4.0   |  31  |  1.40<H>    Ca    9.2      20 Mar 2023 05:56  Phos  3.0     03-20  Mg     1.6     03-20    TPro  6.8  /  Alb  3.1<L>  /  TBili  0.3  /  DBili  x   /  AST  27  /  ALT  29  /  AlkPhos  101      Culture - Abscess with Gram Stain (collected 03-10-23 @ 11:50)  Source: .Abscess left leg  Final Report (03-15-23 @ 19:04):    Few Corynebacterium jeikeium    "Susceptibilities not performed"    Culture - Abscess with Gram Stain (collected 23 @ 13:50)  Source: .Abscess left thigh  Final Report (23 @ 14:49):    Moderate Coag Negative Staphylococcus "Susceptibilities not performed"    Multiple Morphological Strains    Culture - Urine (collected 23 @ 20:15)  Source: Clean Catch Clean Catch (Midstream)  Final Report (23 @ 23:02):    <10,000 CFU/mL Normal Urogenital Shayy    Culture - Blood (collected 23 @ 15:53)  Source: .Blood Blood-Peripheral  Final Report (23 @ 23:01):    No Growth Final    Culture - Blood (collected 23 @ 15:43)  Source: .Blood Blood-Peripheral  Final Report (23 @ 23:01):    No Growth Final    WBC Count: 14.78 K/uL (23 @ 05:56)  WBC Count: 15.73 K/uL (23 @ 18:50)  WBC Count: 7.42 K/uL (23 @ 06:05)  WBC Count: 8.15 K/uL (23 @ 07:05)  WBC Count: 7.24 K/uL (23 @ 05:54)  WBC Count: 8.30 K/uL (23 @ 05:25)    Creatinine, Serum: 1.40 mg/dL (23 @ 05:56)  Creatinine, Serum: 1.30 mg/dL (23 @ 18:50)  Creatinine, Serum: 1.10 mg/dL (23 @ 06:05)  Creatinine, Serum: 1.10 mg/dL (23 @ 07:05)  Creatinine, Serum: 0.98 mg/dL (23 @ 05:54)  Creatinine, Serum: 0.94 mg/dL (23 @ 05:25)    C-Reactive Protein, Serum: 45 mg/L (23 @ 15:53)    Sedimentation Rate, Erythrocyte: 39 mm/hr (23 @ 15:53)    COVID-19 PCR: NotDetec (23 @ 14:48)  COVID-19 PCR: NotDetec (23 @ 18:44)  SARS-CoV-2 Result: NotDetec (23 @ 15:53)    All imaging and other data have been reviewed.  < from: MR Femur No Cont, Left (22 @ 13:40) >  IMPRESSION:  Chronic osteomyelitis with deformity of bone and with scattered areas of   T2 hyperintensity, less prominent than on the previous MRI of 2021,   however cannot exclude superimposed acute osteomyelitis/intraosseous   abscess.  Fluid tract extending from the chronic bony defect is contiguous with a   soft tissue abscess centered deep to the vastus intermedius measuring  12.9 cm craniocaudally, with surrounding surrounding muscle and soft   tissue edema.    Assessment and Plan:   83yo man with PMH of HTN, COPD on home O2, CAD s/p CABG, PVD s/p stents in b/l legs and left femoral fracture more than 50 years ago, was admitted at [facility] in [facility] with left  leg pain on the site of old fracture after CT showed possible intramedullary abscess. He had pain and infection in the old fracture area at least 3-4 times in the past, had multiple surgeries   and drainage of area with a long course of antibiotic treatment.  MRI showed collection, intraosseous abscess  S/P IR drain placement on 21  IR culture NGTD but had another culture with enterobacter.   Completed 6 weeks of ceftriaxone 2gm with PICC line in 2022  He was seen in the clinic and was started on suppressive ciprofloxacin for good oral bioavailability and also based on the culture.  MRI 2022 done showed 12.9cm collection with OM.   He stopped cipro sometimes last year and now feels more pain and swelling in area and started draining again.   Now MRI with 22cm collection.     Had drop in H/H and rectal bleeding for which had EGD 3/13 with Gastritis and peptic ulcer and also colonoscopy on 3/17 with no active bleeding. After multiple transfusions now stable H/H.  Over the weekend was transferred to ICU after aspirating food but now doing well. Not on any supplemental o2.   For the Left thigh USG was done showing 4.5cm collection (MRI that reported 22cmm, as per IR it was whole infected areas including tissue and bone), so didn't advise any intervention or drain   placement. At this time we just can treat with ABx.     Recommendations:  - Blood cultures negative   - Wound discharge sent for culture twice both times with skin shayy, CoNS and corynebacterium   - GI work up noted s/p EGD on 3/13 with gastritis and peptic ulcer, now for colonoscopy    - Continue Cefepime 2gm q8, will follow Creat, is going up, will adjust accordingly  - Will place PICC to prepare him for discharge, will treat 6 weeks and after that restart Ciprofloxacin indefinitely     Will follow.    Brandi العلي MD  Division of Infectious Diseases   Please call ID service at [phone] with any question.      35 minutes spent on total encounter assessing patient, examination, chart review, counseling and coordinating care by the attending physician/nurse/care manager.

## 2023-03-21 LAB
ANION GAP SERPL CALC-SCNC: 2 MMOL/L — LOW (ref 5–17)
BASOPHILS # BLD AUTO: 0.03 K/UL — SIGNIFICANT CHANGE UP (ref 0–0.2)
BASOPHILS NFR BLD AUTO: 0.3 % — SIGNIFICANT CHANGE UP (ref 0–2)
BUN SERPL-MCNC: 31 MG/DL — HIGH (ref 7–23)
CALCIUM SERPL-MCNC: 9.2 MG/DL — SIGNIFICANT CHANGE UP (ref 8.5–10.1)
CHLORIDE SERPL-SCNC: 104 MMOL/L — SIGNIFICANT CHANGE UP (ref 96–108)
CO2 SERPL-SCNC: 36 MMOL/L — HIGH (ref 22–31)
CREAT SERPL-MCNC: 1.3 MG/DL — SIGNIFICANT CHANGE UP (ref 0.5–1.3)
EGFR: 55 ML/MIN/1.73M2 — LOW
EOSINOPHIL # BLD AUTO: 0.02 K/UL — SIGNIFICANT CHANGE UP (ref 0–0.5)
EOSINOPHIL NFR BLD AUTO: 0.2 % — SIGNIFICANT CHANGE UP (ref 0–6)
GLUCOSE SERPL-MCNC: 137 MG/DL — HIGH (ref 70–99)
HCT VFR BLD CALC: 32.8 % — LOW (ref 39–50)
HGB BLD-MCNC: 10 G/DL — LOW (ref 13–17)
IMM GRANULOCYTES NFR BLD AUTO: 0.5 % — SIGNIFICANT CHANGE UP (ref 0–0.9)
LYMPHOCYTES # BLD AUTO: 1.38 K/UL — SIGNIFICANT CHANGE UP (ref 1–3.3)
LYMPHOCYTES # BLD AUTO: 11.6 % — LOW (ref 13–44)
MCHC RBC-ENTMCNC: 29 PG — SIGNIFICANT CHANGE UP (ref 27–34)
MCHC RBC-ENTMCNC: 30.5 GM/DL — LOW (ref 32–36)
MCV RBC AUTO: 95.1 FL — SIGNIFICANT CHANGE UP (ref 80–100)
MONOCYTES # BLD AUTO: 1 K/UL — HIGH (ref 0–0.9)
MONOCYTES NFR BLD AUTO: 8.4 % — SIGNIFICANT CHANGE UP (ref 2–14)
NEUTROPHILS # BLD AUTO: 9.39 K/UL — HIGH (ref 1.8–7.4)
NEUTROPHILS NFR BLD AUTO: 79 % — HIGH (ref 43–77)
NRBC # BLD: 0 /100 WBCS — SIGNIFICANT CHANGE UP (ref 0–0)
PLATELET # BLD AUTO: 311 K/UL — SIGNIFICANT CHANGE UP (ref 150–400)
POTASSIUM SERPL-MCNC: 3.8 MMOL/L — SIGNIFICANT CHANGE UP (ref 3.5–5.3)
POTASSIUM SERPL-SCNC: 3.8 MMOL/L — SIGNIFICANT CHANGE UP (ref 3.5–5.3)
RBC # BLD: 3.45 M/UL — LOW (ref 4.2–5.8)
RBC # FLD: 14.7 % — HIGH (ref 10.3–14.5)
SARS-COV-2 RNA SPEC QL NAA+PROBE: SIGNIFICANT CHANGE UP
SODIUM SERPL-SCNC: 142 MMOL/L — SIGNIFICANT CHANGE UP (ref 135–145)
WBC # BLD: 11.88 K/UL — HIGH (ref 3.8–10.5)
WBC # FLD AUTO: 11.88 K/UL — HIGH (ref 3.8–10.5)

## 2023-03-21 PROCEDURE — 74230 X-RAY XM SWLNG FUNCJ C+: CPT | Mod: 26

## 2023-03-21 PROCEDURE — 99233 SBSQ HOSP IP/OBS HIGH 50: CPT

## 2023-03-21 PROCEDURE — 99232 SBSQ HOSP IP/OBS MODERATE 35: CPT

## 2023-03-21 PROCEDURE — 99233 SBSQ HOSP IP/OBS HIGH 50: CPT | Mod: GC

## 2023-03-21 PROCEDURE — 99291 CRITICAL CARE FIRST HOUR: CPT

## 2023-03-21 RX ORDER — OMEGA-3 ACID ETHYL ESTERS 1 G
1 CAPSULE ORAL
Qty: 0 | Refills: 0 | DISCHARGE

## 2023-03-21 RX ORDER — CEFEPIME 1 G/1
2000 INJECTION, POWDER, FOR SOLUTION INTRAMUSCULAR; INTRAVENOUS EVERY 12 HOURS
Refills: 0 | Status: DISCONTINUED | OUTPATIENT
Start: 2023-03-21 | End: 2023-03-22

## 2023-03-21 RX ORDER — ENOXAPARIN SODIUM 100 MG/ML
40 INJECTION SUBCUTANEOUS EVERY 24 HOURS
Refills: 0 | Status: DISCONTINUED | OUTPATIENT
Start: 2023-03-21 | End: 2023-03-22

## 2023-03-21 RX ADMIN — SIMETHICONE 80 MILLIGRAM(S): 80 TABLET, CHEWABLE ORAL at 12:34

## 2023-03-21 RX ADMIN — Medication 250 MILLIGRAM(S): at 05:59

## 2023-03-21 RX ADMIN — Medication 250 MILLIGRAM(S): at 17:28

## 2023-03-21 RX ADMIN — PREGABALIN 1000 MICROGRAM(S): 225 CAPSULE ORAL at 12:35

## 2023-03-21 RX ADMIN — TAMSULOSIN HYDROCHLORIDE 0.4 MILLIGRAM(S): 0.4 CAPSULE ORAL at 21:05

## 2023-03-21 RX ADMIN — Medication 1 GRAM(S): at 23:22

## 2023-03-21 RX ADMIN — PANTOPRAZOLE SODIUM 40 MILLIGRAM(S): 20 TABLET, DELAYED RELEASE ORAL at 06:00

## 2023-03-21 RX ADMIN — CEFEPIME 100 MILLIGRAM(S): 1 INJECTION, POWDER, FOR SOLUTION INTRAMUSCULAR; INTRAVENOUS at 05:20

## 2023-03-21 RX ADMIN — TIOTROPIUM BROMIDE 2 PUFF(S): 18 CAPSULE ORAL; RESPIRATORY (INHALATION) at 08:19

## 2023-03-21 RX ADMIN — CEFEPIME 100 MILLIGRAM(S): 1 INJECTION, POWDER, FOR SOLUTION INTRAMUSCULAR; INTRAVENOUS at 17:30

## 2023-03-21 RX ADMIN — SIMETHICONE 80 MILLIGRAM(S): 80 TABLET, CHEWABLE ORAL at 23:22

## 2023-03-21 RX ADMIN — Medication 20 MILLIGRAM(S): at 05:59

## 2023-03-21 RX ADMIN — Medication 1 TABLET(S): at 12:34

## 2023-03-21 RX ADMIN — Medication 1 GRAM(S): at 17:28

## 2023-03-21 RX ADMIN — Medication 50 MILLIGRAM(S): at 17:29

## 2023-03-21 RX ADMIN — MAGNESIUM OXIDE 400 MG ORAL TABLET 400 MILLIGRAM(S): 241.3 TABLET ORAL at 17:58

## 2023-03-21 RX ADMIN — ATORVASTATIN CALCIUM 20 MILLIGRAM(S): 80 TABLET, FILM COATED ORAL at 21:05

## 2023-03-21 RX ADMIN — INSULIN GLARGINE 18 UNIT(S): 100 INJECTION, SOLUTION SUBCUTANEOUS at 21:06

## 2023-03-21 RX ADMIN — Medication 1: at 12:32

## 2023-03-21 RX ADMIN — BUDESONIDE AND FORMOTEROL FUMARATE DIHYDRATE 2 PUFF(S): 160; 4.5 AEROSOL RESPIRATORY (INHALATION) at 19:35

## 2023-03-21 RX ADMIN — MAGNESIUM OXIDE 400 MG ORAL TABLET 400 MILLIGRAM(S): 241.3 TABLET ORAL at 12:58

## 2023-03-21 RX ADMIN — BUDESONIDE AND FORMOTEROL FUMARATE DIHYDRATE 2 PUFF(S): 160; 4.5 AEROSOL RESPIRATORY (INHALATION) at 08:19

## 2023-03-21 RX ADMIN — ENOXAPARIN SODIUM 40 MILLIGRAM(S): 100 INJECTION SUBCUTANEOUS at 17:29

## 2023-03-21 RX ADMIN — PANTOPRAZOLE SODIUM 40 MILLIGRAM(S): 20 TABLET, DELAYED RELEASE ORAL at 17:28

## 2023-03-21 RX ADMIN — SIMETHICONE 80 MILLIGRAM(S): 80 TABLET, CHEWABLE ORAL at 05:59

## 2023-03-21 RX ADMIN — Medication 1 GRAM(S): at 12:34

## 2023-03-21 RX ADMIN — MONTELUKAST 10 MILLIGRAM(S): 4 TABLET, CHEWABLE ORAL at 12:34

## 2023-03-21 RX ADMIN — CHLORHEXIDINE GLUCONATE 1 APPLICATION(S): 213 SOLUTION TOPICAL at 06:00

## 2023-03-21 RX ADMIN — MAGNESIUM OXIDE 400 MG ORAL TABLET 400 MILLIGRAM(S): 241.3 TABLET ORAL at 09:51

## 2023-03-21 RX ADMIN — Medication 400 UNIT(S): at 18:33

## 2023-03-21 RX ADMIN — Medication 1: at 17:30

## 2023-03-21 RX ADMIN — Medication 500 MILLIGRAM(S): at 12:34

## 2023-03-21 RX ADMIN — Medication 50 MILLIGRAM(S): at 05:59

## 2023-03-21 RX ADMIN — SIMETHICONE 80 MILLIGRAM(S): 80 TABLET, CHEWABLE ORAL at 17:28

## 2023-03-21 RX ADMIN — Medication 1 GRAM(S): at 05:59

## 2023-03-21 RX ADMIN — Medication 1: at 21:07

## 2023-03-21 NOTE — SWALLOW VFSS/MBS ASSESSMENT ADULT - RECOMMENDED CONSISTENCY
Soft and bite sized solids with thin liquids. All liquids to be consumed via small, single cup sip ONLY.

## 2023-03-21 NOTE — SWALLOW BEDSIDE ASSESSMENT ADULT - SWALLOW EVAL: DIAGNOSIS
functional oral management given minced and moist solids, puree, mildly thick liquid and thin liquid textures marked by adequate bolus collection, transfer and transport. mild oral dysphagia soft and bite sized solids marked by adequate collection with prolonged mastication resulting in delayed oral preparation/AP transit. trace lingual residue noted post swallow which reduces with liquid wash. mild pharyngeal dysphagia given all administered textures marked by delayed pharyngeal swallow trigger and reduced hyolaryngeal excursion. no overt s/s of penetration/aspiration noted.

## 2023-03-21 NOTE — PHARMACOTHERAPY INTERVENTION NOTE - COMMENTS
Patient is on cefepime 2g q8hrs for OM. Discussed w/ ICU fellow Dr. Asif and recommended decreasing dose to 2g q12h based on patient’s renal function (CrCl ~51.3 mL/min). MD accepted and order was updated.

## 2023-03-21 NOTE — PROGRESS NOTE ADULT - ASSESSMENT
82 yr male with history of Hypertension, COPD home oxygen dependent, CAD CABG , chronic left femor osteomyelitis since traumatic left femoral fracture in 1966 presents with new yellowish  drainage from left thigh. Cardiology consulted for clearance for GI scopy. Course complicated by severe hypoxia for which RRT was called, patient transferred to ICU.    Hypoxia  - Pulm edema vs. choking incident  - Appears back to baseline s/p bipap and lasix  - Continue supplemental O2 as needed with nocturnal bipap    CAD s/p CABG  - EKG showed SR @ 93.  No evidence of any active ischemia.  No anginal complaints  - Continue BB and statin   - Please resume ASA when cleared by GI    - Previous TTE 12/21 showed overall normal systolic function with an estimated LVEF of 55-60%.   - Pt sees Dr. Rod for cardiology.   - No evidence of any meaningful volume overload laying flat.  Min to mild edema. Continue home Lasix 20 mg PO daily   - s/p EGD gastritis, gastric ulcer, hiatal hernia. s/p colon cecal polyp and avb, clipped.  Follow GI recs      - SCDs for DVT prophylaxis, holding Eliquis in setting of GI, restart pending GI recs  - Monitor and replete lytes, keep K>4, Mg>2.  - Will continue to follow

## 2023-03-21 NOTE — PROGRESS NOTE ADULT - SUBJECTIVE AND OBJECTIVE BOX
INTERVAL HPI/OVERNIGHT EVENTS:  No acute events overnight. Patient examined at bedside this AM in NAD. Had normal non-bloody formed stools last night. Pt currently has no complaints. Waiting for PICC line placement tomorrow. Denies sob, chest pain, abd pain, n/v.     REVIEW OF SYSTEM:  CV: Denies chest pain  Resp: Denies SOB  GI: Denies abdominal pain, constipation, diarrhea, nausea, vomiting  : Denies dysuria  ID: Denies fevers, chills  MSK: Denies joint pain     VITALS:  ICU Vital Signs Last 24 Hrs  T(C): 36.9 (21 Mar 2023 08:12), Max: 36.9 (21 Mar 2023 08:12)  T(F): 98.5 (21 Mar 2023 08:12), Max: 98.5 (21 Mar 2023 08:12)  HR: 89 (21 Mar 2023 10:00) (64 - 113)  BP: 93/50 (21 Mar 2023 10:00) (93/50 - 139/65)  BP(mean): 67 (21 Mar 2023 10:00) (67 - 94)  ABP: --  ABP(mean): --  RR: 27 (21 Mar 2023 10:00) (15 - 34)  SpO2: 94% (21 Mar 2023 10:00) (91% - 100%)    O2 Parameters below as of 21 Mar 2023 08:00  Patient On (Oxygen Delivery Method): room air      CAPILLARY BLOOD GLUCOSE      POCT Blood Glucose.: 183 mg/dL (21 Mar 2023 12:23)    I&O's Summary    20 Mar 2023 07:01  -  21 Mar 2023 07:00  --------------------------------------------------------  IN: 150 mL / OUT: 1500 mL / NET: -1350 mL    21 Mar 2023 07:01  -  21 Mar 2023 12:59  --------------------------------------------------------  IN: 370 mL / OUT: 650 mL / NET: -280 mL      PHYSICAL EXAM:   Gen: Lying in bed comfortably. NAD.  Neuro: Answers questions appropriately. Follows command.  HEENT: EOMI. Conjunctiva and sclera clear.  Neck: Supple. No JVD.  Resp: CTA b/l. No crackles, wheezing, or rhonchi.  CVS: +S1, S2. RRR. No murmurs, rubs, or gallops.  Abd: Soft, nontender, nondistended. +BS.  Ext: No clubbing, cyanosis. No calf tenderness. No edema. L thigh wound drainage with dressings c/d/i.  Skin: Warm and perfused.      MEDS:  MEDICATIONS  (STANDING):  ascorbic acid 500 milliGRAM(s) Oral daily  atorvastatin 20 milliGRAM(s) Oral at bedtime  azithromycin   Tablet 250 milliGRAM(s) Oral <User Schedule>  budesonide 160 MICROgram(s)/formoterol 4.5 MICROgram(s) Inhaler 2 Puff(s) Inhalation two times a day  cefepime   IVPB 2000 milliGRAM(s) IV Intermittent every 12 hours  chlorhexidine 2% Cloths 1 Application(s) Topical <User Schedule>  cholecalciferol 400 Unit(s) Oral daily  cyanocobalamin 1000 MICROGram(s) Oral daily  dextrose 5%. 1000 milliLiter(s) (100 mL/Hr) IV Continuous <Continuous>  dextrose 5%. 1000 milliLiter(s) (50 mL/Hr) IV Continuous <Continuous>  dextrose 50% Injectable 25 Gram(s) IV Push once  dextrose 50% Injectable 12.5 Gram(s) IV Push once  dextrose 50% Injectable 25 Gram(s) IV Push once  enoxaparin Injectable 40 milliGRAM(s) SubCutaneous every 24 hours  furosemide    Tablet 20 milliGRAM(s) Oral daily  glucagon  Injectable 1 milliGRAM(s) IntraMuscular once  hydrocortisone hemorrhoidal Suppository 1 Suppository(s) Rectal at bedtime  insulin glargine Injectable (LANTUS) 18 Unit(s) SubCutaneous at bedtime  insulin lispro (ADMELOG) corrective regimen sliding scale   SubCutaneous Before meals and at bedtime  magnesium oxide 400 milliGRAM(s) Oral three times a day with meals  mepolizumab Subcutaneous Injectable 100 milliGRAM(s) SubCutaneous every 4 weeks  metoprolol tartrate 50 milliGRAM(s) Oral two times a day  montelukast 10 milliGRAM(s) Oral daily  multivitamin 1 Tablet(s) Oral daily  Nephro-carlos 1 Tablet(s) Oral daily  pantoprazole    Tablet 40 milliGRAM(s) Oral every 12 hours  polyethylene glycol 3350 17 Gram(s) Oral daily  saccharomyces boulardii 250 milliGRAM(s) Oral two times a day  senna 2 Tablet(s) Oral at bedtime  simethicone 80 milliGRAM(s) Chew every 6 hours  sucralfate 1 Gram(s) Oral four times a day  tamsulosin 0.4 milliGRAM(s) Oral at bedtime  tiotropium 2.5 MICROgram(s) Inhaler 2 Puff(s) Inhalation daily    MEDICATIONS  (PRN):  albuterol/ipratropium for Nebulization 3 milliLiter(s) Nebulizer every 6 hours PRN Shortness of Breath and/or Wheezing  dextrose Oral Gel 15 Gram(s) Oral once PRN Blood Glucose LESS THAN 70 milliGRAM(s)/deciliter    LABS:                        10.0   11.88 )-----------( 311      ( 21 Mar 2023 05:33 )             32.8     03-21    142  |  104  |  31<H>  ----------------------------<  137<H>  3.8   |  36<H>  |  1.30    Ca    9.2      21 Mar 2023 05:33  Phos  3.0     03-20  Mg     1.6     03-20    TPro  6.8  /  Alb  3.1<L>  /  TBili  0.3  /  DBili  x   /  AST  27  /  ALT  29  /  AlkPhos  101  03-19    RADIOLOGY:    BEDSIDE ULTRASOUND:    TUBES/LINE:  Central Line: N  Gonzáles: N  A-Line: N     GLOBAL ISSUE/BEST PRACTICE:  Analgesia: N  Sedation: N  CAM-ICU: N  HOB elevation: Y  Stress ulcer prophylaxis: Y  VTE prophylaxis: Y  Glycemic control: Y  Nutrition: Y    CODE STATUS: Full Code

## 2023-03-21 NOTE — SWALLOW BEDSIDE ASSESSMENT ADULT - COMMENTS
Clinical swallow evaluation completed. Full report to follow. Pt was alert, cooperative and positioned upright in bed for a clinical assessment of swallow function this AM. 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 thigh infection/abscess, hospital course c/b RRT for acute hypoxic resp failure requiring transfer to MICU for possible APE vs ACOPDE"    Recent chest CT revealed "No findings seen to suggest pulmonary edema. Emphysema. Bilateral trace pleural effusions versus pleural thickening at the costophrenic angles."     Per chart review, pt with episode of "choking on food" during admission. Per discussion with pt, pt states this choking episode occurred when "eating meat laying down". He states that when eating/drinking in an upright position, he does not experience dysphagia. However, pt's wife is present at bedside and reports pt with observed periods of coughing with meals prior to admission.

## 2023-03-21 NOTE — SWALLOW BEDSIDE ASSESSMENT ADULT - SWALLOW EVAL: RECOMMENDED FEEDING/EATING TECHNIQUES
allow for swallow between intakes/alternate food with liquid/check mouth frequently for oral residue/pocketing/hard swallow w/ each bite or sip/maintain upright posture during/after eating for 30 mins/no straws/oral hygiene/position upright (90 degrees)/small sips/bites

## 2023-03-21 NOTE — SWALLOW BEDSIDE ASSESSMENT ADULT - ADDITIONAL RECOMMENDATIONS
This dept to continue to f/u as schedule permits for diet tolerance, MBS completion (pending MD order) and swallow therapy. MD advised to further reconsult this service should concern re: diet management and/or change in status occur.

## 2023-03-21 NOTE — CHART NOTE - NSCHARTNOTEFT_GEN_A_CORE
Assessment: patient seen for follow up now in ICU   82y old  Male who presents with a chief complaint of Left  thigh draining sinus , status post EGD gastic ulcer gastritis hiatal hernia colonoscopy polyp biopsy    Acute hypoxic resp failure suspected to be 2/2 to APE vs ACOPDE plan for bipap support transferred to ICU  patient seen with wife, RN , speech pathologist in ICU . apparently ate eggs for breakfast this AM  food preferences noted  BM overnight  RD spoke to ICU team recommend soft and bite size consistent cho glucrena daily   (willing to try glucerna)      Factors impacting intake: [ ] none [ ] nausea  [ ] vomiting [ ] diarrhea [ ] constipation  [ ]chewing problems [x ] swallowing issues  [ ] other: speech recommends soft and bite sized     Diet Prescription: consistent cho soft and bite sized glucerna daily  Intake: fairly good PO this AM    Current Weight: Weight 3/21 216# 3/8 wt 217#      Pertinent Medications: MEDICATIONS  (STANDING):  ascorbic acid 500 milliGRAM(s) Oral daily  atorvastatin 20 milliGRAM(s) Oral at bedtime  azithromycin   Tablet 250 milliGRAM(s) Oral <User Schedule>  budesonide 160 MICROgram(s)/formoterol 4.5 MICROgram(s) Inhaler 2 Puff(s) Inhalation two times a day  cefepime   IVPB 2000 milliGRAM(s) IV Intermittent every 8 hours  chlorhexidine 2% Cloths 1 Application(s) Topical <User Schedule>  cholecalciferol 400 Unit(s) Oral daily  cyanocobalamin 1000 MICROGram(s) Oral daily  dextrose 5%. 1000 milliLiter(s) (100 mL/Hr) IV Continuous <Continuous>  dextrose 5%. 1000 milliLiter(s) (50 mL/Hr) IV Continuous <Continuous>  dextrose 50% Injectable 25 Gram(s) IV Push once  dextrose 50% Injectable 12.5 Gram(s) IV Push once  dextrose 50% Injectable 25 Gram(s) IV Push once  furosemide    Tablet 20 milliGRAM(s) Oral daily  glucagon  Injectable 1 milliGRAM(s) IntraMuscular once  hydrocortisone hemorrhoidal Suppository 1 Suppository(s) Rectal at bedtime  insulin glargine Injectable (LANTUS) 18 Unit(s) SubCutaneous at bedtime  insulin lispro (ADMELOG) corrective regimen sliding scale   SubCutaneous Before meals and at bedtime  magnesium oxide 400 milliGRAM(s) Oral three times a day with meals  mepolizumab Subcutaneous Injectable 100 milliGRAM(s) SubCutaneous every 4 weeks  metoprolol tartrate 50 milliGRAM(s) Oral two times a day  montelukast 10 milliGRAM(s) Oral daily  multivitamin 1 Tablet(s) Oral daily  Nephro-carlos 1 Tablet(s) Oral daily  pantoprazole    Tablet 40 milliGRAM(s) Oral every 12 hours  polyethylene glycol 3350 17 Gram(s) Oral daily  saccharomyces boulardii 250 milliGRAM(s) Oral two times a day  senna 2 Tablet(s) Oral at bedtime  simethicone 80 milliGRAM(s) Chew every 6 hours  sucralfate 1 Gram(s) Oral four times a day  tamsulosin 0.4 milliGRAM(s) Oral at bedtime  tiotropium 2.5 MICROgram(s) Inhaler 2 Puff(s) Inhalation daily    MEDICATIONS  (PRN):  albuterol/ipratropium for Nebulization 3 milliLiter(s) Nebulizer every 6 hours PRN Shortness of Breath and/or Wheezing  dextrose Oral Gel 15 Gram(s) Oral once PRN Blood Glucose LESS THAN 70 milliGRAM(s)/deciliter    Pertinent Labs: POCT 127, 267   Skin: left lateral thigh cellulitis     Estimated Needs:   [x ] no change since previous assessment based on 214# 20-25kcals /kg 1940-2425kcals and 106-126gms protein 1.1-1.3gms/kg   [ ] recalculated:     Previous Nutrition Diagnosis:   [ ] Inadequate Energy Intake [ ]Inadequate Oral Intake [ ] Excessive Energy Intake   [ ] Underweight [ ] Increased Nutrient Needs [ ] Overweight/Obesity   [ ] Altered GI Function [ ] Unintended Weight Loss [ ] Food & Nutrition Related Knowledge Deficit [ ] Malnutrition   (X) altered nutrition related labs blood sugars  Nutrition Diagnosis is [x ] ongoing  [ ] resolved [ ] not applicable     New Nutrition Diagnosis: [x ] swallow difficulty related to motor causes as evidenced by soft and bite sized diet recommended with MBS recommended       Interventions:   Recommend  [x ] Change Diet To: soft and bite sized consistent cho glucerna daily (RD spoke to medical team)  [ ] Nutrition Supplement  [ ] Nutrition Support  [x ] Other: recommend discontinue nephrovite as MVI ordered as well, follow for MBS     Monitoring and Evaluation:   x[ ] PO intake [ x ] Tolerance to diet prescription [ x ] weights [ x ] labs[ x ] follow up per protocol  [ ] other:

## 2023-03-21 NOTE — SWALLOW VFSS/MBS ASSESSMENT ADULT - COMMENTS
Pt was received in the radiology suite alert, cooperative and positioned upright in flouroscopy seating unit this PM. 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 thigh infection/abscess, hospital course c/b RRT for acute hypoxic resp failure requiring transfer to MICU for possible APE vs ACOPDE"    Recent chest CT revealed "No findings seen to suggest pulmonary edema. Emphysema. Bilateral trace pleural effusions versus pleural thickening at the costophrenic angles."     Per chart review, pt with episode of "choking on food" during admission. Per discussion with pt and wife at bedside this AM, pt states this choking episode occurred when "eating meat laying down". He states that when eating/drinking in an upright position, he does not experience dysphagia. However, pt's wife is present at bedside and reports pt with observed periods of coughing with meals prior to admission.

## 2023-03-21 NOTE — PROGRESS NOTE ADULT - ATTENDING COMMENTS
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, former smoker, chronic hypoxemic and hypercapnic respiratory failure on home oxygen and nocturnal BiPAP, history of cardiac arrest in 2018 due to respiratory failure, HTN, HLD, DM2 (not on insulin), CAD s/p 3V-CABG (2004) and PCI (Oct 2019), PVD s/p bilateral LE stents (most recently Nov 2019) on plavix, A-Fib on apixaban, BPH, and left femur fracture with chronic OM (s/p recent drainage in Dec 2021 of intramedullary abscess, previously on suppressive ciprofloxacin) who presented to [facility] with yellow drainage from L thigh wound, not a candidate for drainage per IR, cultures growing Corynebacterium jeikeium and coag negative staph. Started on cefepime. 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 Course further complicated by episode of respiratory distress and hypertension after choking on food on 3/19, now improved with BiPAP.    1. Neuro: AAOx3, moving all extremities. PT eval, out of bed to chair  2. CV: HD stable, hold apixaban and aspirin until 3/24 if H/H remains stable. Continue atorvastatin and metoprolol. Furosemide 20 mg qd  3. Pulm: continue BiPAP 18/8 qhs. On room air during the day at rest. Supplemental oxygen with NC@2 LPM with ambulation. Remains off steroids. Continue mepolizumab 100 mg every month (next due 4/10, may need to switch to Fasenra given insurance coverage). Continue Symbicort and Spiriva. Albuterol-ipratropium nebs prn. Montelukast 10 mg qhs. Azithromycin 250 mg MWF. Avoid hyperoxia  4. GI: consistent carb diet, soft and bite sized. Plan for MBS to rule out dysphagia given recent choking episodes. Pantoprazole 40 mg bid, continue sucralfate. Bowel regimen  5. Renal: CKD with stable creatinine 1.3. Close monitoring of kidney function and lytes, strict I/O's  6. ID: continue cefepime (2 gm IV q12h, renally dosed). Plan for IR PICC tomorrow, f/up ID  7. Heme: start enoxaparin 40 mg sq qd for DVT ppx. Restart apixaban and aspirin on 3/24 if no further bleeding  8. Endo: DM2, a1c 8.0. Continue Lantus 18 units qhs + insulin coverage scale. Upon discharge, would decrease Lantus dose to 14 units as he will restart home Jardiance. He has Novolog for insulin coverage scale (2 units for -200, 4 units for -250, 6 units for -300, 8 units for -350, 10 units for -400, call MD if FSG>400). Endocrine evaluation  9. Skin: no lines or cunha  10. Dispo: full code, discussed with patient and wife Sania at bedside. Discussed with case management to arrange VNS services and home antibiotics. D/c planning tomorrow after PICC
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, former smoker, chronic hypoxemic and hypercapnic respiratory failure on home oxygen and nocturnal BiPAP, history of cardiac arrest in 2018 due to respiratory failure, HTN, HLD, DM2 (not on insulin), CAD s/p 3V-CABG (2004) and PCI (Oct 2019), PVD s/p bilateral LE stents (most recently Nov 2019) on plavix, A-Fib on apixaban, BPH, and left femur fracture with chronic OM (s/p recent drainage in Dec 2021 of intramedullary abscess, previously on suppressive ciprofloxacin) who presented to [facility] with yellow drainage from L thigh wound, not a candidate for drainage per IR, cultures growing Corynebacterium jeikeium and coag negative staph. Started on cefepime. 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 Course further complicated by episode of respiratory distress and hypertension after choking on food on 3/19, now improved with BiPAP.    1. Neuro: AAOx3, moving all extremities. PT eval, out of bed to chair  2. CV: HD stable, hold apixaban and aspirin until 3/24 if H/H remains stable. Continue atorvastatin and metoprolol. Restart furosemide 20 mg qd  3. Pulm: continue BiPAP 18/8 qhs. On room air during the day at rest. Supplemental oxygen with NC@2 LPM with ambulation. Remains off steroids. Continue mepolizumab 100 mg every month (next due 4/10, may need to switch to Fasenra given insurance coverage). Continue Symbicort and Spiriva. Albuterol-ipratropium nebs prn. Montelukast 10 mg qhs. Azithromycin 250 mg MWF. Avoid hyperoxia  4. GI: consistent carb diet. Pantoprazole 40 mg bid, continue sucralfate. Bowel regimen  5. Renal: CKD with stable creatinine 1.4. Close monitoring of kidney function and lytes, strict I/O's  6. ID: continue cefepime, plan for IR PICC this Wednesday, f/up ID  7. Heme: SCD's for DVT ppx, restart apixaban and aspirin on 3/24 if no further bleeding  8. Endo: DM2, continue Lantus + insulin coverage scale, endo eval   9. Skin: no lines or cunha  10. Dispo: full code, discussed with patient and wife Sania at bedside
Continue IV Lasix.  Continue nodals.  Monitor soft Bp.  DOAC post thora.  To follow. A t risk for abrupt decompensation.

## 2023-03-21 NOTE — PROGRESS NOTE ADULT - ASSESSMENT
anemia  GIB    s/p egd 3/13 showing gastric ulcer; gastritis; gastric ulcer; hiatal hernia  s/p colonoscopy with bx and clip 3/17; cecal polyp; cecal avm   monitor cbc  if rebleed capsule endoscopy as outpatient  hemorrhoidal treatment  Keep Hgb >8  ok to restart asa  hold eliquis for 1 week from colonoscopy  MBS noted; diet as per SLP recs  d/w pt and wife at bedside  Will follow    I reviewed the overnight course of events on the unit, re-confirming the patient history. I discussed the care with the patient and their family  Differential diagnosis and plan of care discussed with patient after the evaluation  50 minutes spent on total encounter of which more than fifty percent of the encounter was spent counseling and/or coordinating care by the attending physician.  Advanced care planning was discussed with patient and family.  Advanced care planning forms were reviewed and discussed.  Risks, benefits and alternatives of gastroenterologic procedures were discussed in detail and all questions were answered.

## 2023-03-21 NOTE — SWALLOW VFSS/MBS ASSESSMENT ADULT - DIAGNOSTIC IMPRESSIONS
MBS Completed. Full report to follow 1- mild oral dysphagia given soft and bite sized solids, puree and thin liquid textures marked by delayed bolus collection, transfer and transport. posterior loss extending to oropharynx noted across consistencies. 2- mild-moderate oral dysphagia given easy to chew solids marked by delayed bolus collection, transfer and transport with increased work of breathing observed during prolonged mastication. further, pt with c/o fatigue with observed pauses in mastication. posterior loss over base of tongue to oropharynx observed. trace lingual residue noted post swallow. 3- mild pharyngeal dysphagia given easy to chew solids, soft and bite sized solids and puree textures marked by delayed pharyngeal swallow trigger, reduced tongue base propulsion, reduced epiglottic deflection and reduced hyolaryngeal excursion. trace-mild residue noted at base of tongue and valleculae. no penetration or aspiration viewed. 3- mild-moderate pharyngeal dysphagia given thin liquids marked by  delayed pharyngeal swallow trigger, reduced tongue base propulsion, reduced epiglottic deflection and reduced hyolaryngeal excursion. with large PO quantities/consecutive cup sips, silent penetration with and without retrieval is observed with mild-moderate stasis at base of tongue, valleculae and alongside bilateral pyriform sinuses. when pt is advised to consume small, single cup sips, penetration is not reduplicated with a reduction in stasis noted to trace amounts at the base of tongue and valleculae. no aspiration viewed.

## 2023-03-21 NOTE — CASE MANAGEMENT PROGRESS NOTE - NSCMPROGRESSNOTE_GEN_ALL_CORE
called and asked to start ref for IV ABX. CM reviewed chart and spoke with MD/RN and patients wife, Sania. patient was not available for   interview. The plan is for PICC to be placed 3/22 and to receive pm dose of cefepime at 4pm then for DC home. [facility] care for SOC 3/23 am. Wife and MD aware of plan. Dr العلي notified of agency and she will call in orders. CM to follow up tomorrow

## 2023-03-21 NOTE — SWALLOW VFSS/MBS ASSESSMENT ADULT - ADDITIONAL RECOMMENDATIONS
This dept to continue to f/u as schedule permits for diet tolerance and swallow therapy. Continued swallow therapy is recommended upon discharge and can be coordinated at the [facility] at [phone]. MD further advised to reconsult this service should a concern in diet management and/or change in status arise.

## 2023-03-21 NOTE — SWALLOW VFSS/MBS ASSESSMENT ADULT - SPECIFY REASON(S)
to objectively assess swallow function. pt is familiar to this service from a clinical swallow evaluation completed earlier this AM (see reports for details)

## 2023-03-21 NOTE — PROGRESS NOTE ADULT - ASSESSMENT
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 thigh infection/abscess, hospital course c/b RRT for acute hypoxic resp failure requiring transfer to MICU for possible APE vs ACOPDE.    Neuro:   - Mental status at baseline.     Cardiac:   - Continue home lasix 20mg qd.   - h/o pAtach, Cont. metoprolol  - h/o CAD. Cont. statin  - May restart eliquis, aspirin, plavix on 3/24 provided there are no more episodes of GIB as per GI recs    Resp:   - Acute respiratory failure likely 2/2 episode of choking on food while eating laying flat in bed. Received lasix 40mg IVP and solumedrol. Resolved.  - CXR obtained during rapid with no significant changes from previous.   - Off steroids  - Cont. Azithro MWF for ppx  - Cont. Spiriva, Symbicort, Montelukast  - Cont. Duonebs prn  - Cont. BiPAP at night time    GI:   - Advanced diet to soft and bite sized with thin liquids as per S/S recs  - MBS today noted  - Hospital course involved GIB. EGD on 3/13: gastritis and gastric ulcer. Colonoscopy on 3/17: cecal polyp/avm s/p bx and clip  - Continue sucralfate and PPI  - Bowel regimen    Renal:    - Overall stable renal function, normal lytes    ID:   - Chronic OM/abscess, on IV cefepime  - IR consulted for PICC placement pending tomorrow  - Will need to continue IV cefepime at home for total of 6 weeks then cipro indefinitely afterwards    Endo:   - T2DM, A1c 8.0  - Elevated glucose readings; on Lantus 18units qhs  - Cont ISS and FS qac/qhs  - Endo consult for discharge meds    Heme:  - Lovenox for DVT ppx    Dispo:   - Discharge planning tomorrow after PICC placement Pt is 83 yo M w/ PMH of HTN, 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 thigh infection/abscess, hospital course c/b RRT for acute hypoxic resp failure requiring transfer to MICU for possible APE vs ACOPDE.    Neuro:   - Mental status at baseline.     Cardiac:   - Continue home lasix 20mg qd.   - h/o pAtach, Cont. metoprolol  - h/o CAD. Cont. statin  - May restart eliquis, aspirin on 3/24 provided there are no more episodes of GIB as per GI recs    Resp:   - Acute respiratory failure likely 2/2 episode of choking on food while eating laying flat in bed. Received lasix 40mg IVP and solumedrol. Resolved.  - CXR obtained during rapid with no significant changes from previous.   - Off steroids  - Cont. Azithro MWF for ppx  - Cont. Spiriva, Symbicort, Montelukast  - Cont. Duonebs prn  - Cont. BiPAP at night time    GI:   - Advanced diet to soft and bite sized with thin liquids as per S/S recs  - MBS today noted  - Hospital course involved GIB. EGD on 3/13: gastritis and gastric ulcer. Colonoscopy on 3/17: cecal polyp/avm s/p bx and clip  - Continue sucralfate and PPI  - Bowel regimen    Renal:    - Overall stable renal function, normal lytes    ID:   - Chronic OM/abscess, on IV cefepime  - IR consulted for PICC placement pending tomorrow  - Will need to continue IV cefepime at home for total of 6 weeks then cipro indefinitely afterwards    Endo:   - T2DM, A1c 8.0  - Elevated glucose readings; on Lantus 18units qhs  - Cont ISS and FS qac/qhs  - Endo consult for discharge meds    Heme:  - Lovenox for DVT ppx    Dispo:   - Discharge planning tomorrow after PICC placement

## 2023-03-21 NOTE — PROGRESS NOTE ADULT - SUBJECTIVE AND OBJECTIVE BOX
[facility] -- Saud Aguilar, Dani, Génesis, Medina, Kumar Hodges      Follow Up:  Respiratory failure, CAD    Subjective/Observations: Patient seen and examined at bedside. No overnight events. Patient is saturating well on NC, denies       REVIEW OF SYSTEMS: All other review of systems is negative unless indicated above    PAST MEDICAL & SURGICAL HISTORY:  Diabetes Mellitus, Type II      CAD (Coronary Artery Disease)  s/p 3v CABG 2004; stents placed in [place] in 2019      Dyslipidemia      Osteomyelitis      COPD (chronic obstructive pulmonary disease)  on 2L at home and BiPAP at night; intubated 6/18      Hypertension      PVD (peripheral vascular disease)      History of PAT (paroxysmal atrial tachycardia)      Asthma with COPD      BPH (benign prostatic hyperplasia)      Acute osteomyelitis      CABG (Coronary Artery Bypass Graft)  2004      Compound fracture  left leg      S/P primary angioplasty with coronary stent      H/O drainage of abscess  Left femur 12/2021          MEDICATIONS  (STANDING):  ascorbic acid 500 milliGRAM(s) Oral daily  atorvastatin 20 milliGRAM(s) Oral at bedtime  azithromycin   Tablet 250 milliGRAM(s) Oral <User Schedule>  budesonide 160 MICROgram(s)/formoterol 4.5 MICROgram(s) Inhaler 2 Puff(s) Inhalation two times a day  cefepime   IVPB 2000 milliGRAM(s) IV Intermittent every 12 hours  chlorhexidine 2% Cloths 1 Application(s) Topical <User Schedule>  cholecalciferol 400 Unit(s) Oral daily  cyanocobalamin 1000 MICROGram(s) Oral daily  dextrose 5%. 1000 milliLiter(s) (50 mL/Hr) IV Continuous <Continuous>  dextrose 5%. 1000 milliLiter(s) (100 mL/Hr) IV Continuous <Continuous>  dextrose 50% Injectable 25 Gram(s) IV Push once  dextrose 50% Injectable 12.5 Gram(s) IV Push once  dextrose 50% Injectable 25 Gram(s) IV Push once  furosemide    Tablet 20 milliGRAM(s) Oral daily  glucagon  Injectable 1 milliGRAM(s) IntraMuscular once  hydrocortisone hemorrhoidal Suppository 1 Suppository(s) Rectal at bedtime  insulin glargine Injectable (LANTUS) 18 Unit(s) SubCutaneous at bedtime  insulin lispro (ADMELOG) corrective regimen sliding scale   SubCutaneous Before meals and at bedtime  magnesium oxide 400 milliGRAM(s) Oral three times a day with meals  mepolizumab Subcutaneous Injectable 100 milliGRAM(s) SubCutaneous every 4 weeks  metoprolol tartrate 50 milliGRAM(s) Oral two times a day  montelukast 10 milliGRAM(s) Oral daily  multivitamin 1 Tablet(s) Oral daily  Nephro-carlos 1 Tablet(s) Oral daily  pantoprazole    Tablet 40 milliGRAM(s) Oral every 12 hours  polyethylene glycol 3350 17 Gram(s) Oral daily  saccharomyces boulardii 250 milliGRAM(s) Oral two times a day  senna 2 Tablet(s) Oral at bedtime  simethicone 80 milliGRAM(s) Chew every 6 hours  sucralfate 1 Gram(s) Oral four times a day  tamsulosin 0.4 milliGRAM(s) Oral at bedtime  tiotropium 2.5 MICROgram(s) Inhaler 2 Puff(s) Inhalation daily    MEDICATIONS  (PRN):  albuterol/ipratropium for Nebulization 3 milliLiter(s) Nebulizer every 6 hours PRN Shortness of Breath and/or Wheezing  dextrose Oral Gel 15 Gram(s) Oral once PRN Blood Glucose LESS THAN 70 milliGRAM(s)/deciliter      Allergies    No Known Allergies    Intolerances            Vital Signs Last 24 Hrs  T(C): 36.9 (21 Mar 2023 08:12), Max: 36.9 (21 Mar 2023 08:12)  T(F): 98.5 (21 Mar 2023 08:12), Max: 98.5 (21 Mar 2023 08:12)  HR: 98 (21 Mar 2023 09:00) (64 - 113)  BP: 131/59 (21 Mar 2023 09:00) (94/62 - 140/64)  BP(mean): 85 (21 Mar 2023 09:00) (74 - 94)  RR: 33 (21 Mar 2023 09:00) (15 - 34)  SpO2: 94% (21 Mar 2023 09:00) (91% - 100%)    Parameters below as of 21 Mar 2023 08:00  Patient On (Oxygen Delivery Method): room air        I&O's Summary    20 Mar 2023 07:01  -  21 Mar 2023 07:00  --------------------------------------------------------  IN: 150 mL / OUT: 1500 mL / NET: -1350 mL    21 Mar 2023 07:01  -  21 Mar 2023 10:10  --------------------------------------------------------  IN: 370 mL / OUT: 650 mL / NET: -280 mL          PHYSICAL EXAM:  TELE:   Constitutional: NAD, awake and alert, well-developed  HEENT: Moist Mucous Membranes, Anicteric  Pulmonary: Non-labored, breath sounds are clear bilaterally, No wheezing, rales or rhonchi  Cardiovascular: Regular, S1 and S2, No murmurs, rubs, gallops or clicks  Gastrointestinal: Bowel Sounds present, soft, nontender.   Lymph: No peripheral edema. No lymphadenopathy.  Skin: No visible rashes or ulcers.  Psych:  Mood & affect appropriate    LABS: All Labs Reviewed:                        10.0   11.88 )-----------( 311      ( 21 Mar 2023 05:33 )             32.8                         10.4   14.78 )-----------( 294      ( 20 Mar 2023 05:56 )             34.5                         10.7   15.73 )-----------( 325      ( 19 Mar 2023 18:50 )             36.1     21 Mar 2023 05:33    142    |  104    |  31     ----------------------------<  137    3.8     |  36     |  1.30   20 Mar 2023 05:56    140    |  104    |  22     ----------------------------<  260    4.0     |  31     |  1.40   19 Mar 2023 18:50    141    |  102    |  17     ----------------------------<  164    3.5     |  33     |  1.30     Ca    9.2        21 Mar 2023 05:33  Ca    9.2        20 Mar 2023 05:56  Ca    9.3        19 Mar 2023 18:50  Phos  3.0       20 Mar 2023 05:56  Phos  3.4       19 Mar 2023 18:50  Mg     1.6       20 Mar 2023 05:56  Mg     1.5       19 Mar 2023 18:50  Mg     1.4       19 Mar 2023 06:05    TPro  6.8    /  Alb  3.1    /  TBili  0.3    /  DBili  x      /  AST  27     /  ALT  29     /  AlkPhos  101    19 Mar 2023 18:50  TPro  5.4    /  Alb  2.5    /  TBili  0.3    /  DBili  x      /  AST  18     /  ALT  24     /  AlkPhos  76     19 Mar 2023 06:05                      [facility] -- Saud Aguilar, Dani, Génesis, Medina, Carroll, Kumar      Follow Up:  Respiratory failure, CAD    Subjective/Observations: Patient seen and examined at bedside. No overnight events. Patient is saturating well on NC, denies any shortness of breath, chest pain, palpitations.      REVIEW OF SYSTEMS: All other review of systems is negative unless indicated above    PAST MEDICAL & SURGICAL HISTORY:  Diabetes Mellitus, Type II      CAD (Coronary Artery Disease)  s/p 3v CABG 2004; stents placed in [place] in 2019      Dyslipidemia      Osteomyelitis      COPD (chronic obstructive pulmonary disease)  on 2L at home and BiPAP at night; intubated 6/18      Hypertension      PVD (peripheral vascular disease)      History of PAT (paroxysmal atrial tachycardia)      Asthma with COPD      BPH (benign prostatic hyperplasia)      Acute osteomyelitis      CABG (Coronary Artery Bypass Graft)  2004      Compound fracture  left leg      S/P primary angioplasty with coronary stent      H/O drainage of abscess  Left femur 12/2021          MEDICATIONS  (STANDING):  ascorbic acid 500 milliGRAM(s) Oral daily  atorvastatin 20 milliGRAM(s) Oral at bedtime  azithromycin   Tablet 250 milliGRAM(s) Oral <User Schedule>  budesonide 160 MICROgram(s)/formoterol 4.5 MICROgram(s) Inhaler 2 Puff(s) Inhalation two times a day  cefepime   IVPB 2000 milliGRAM(s) IV Intermittent every 12 hours  chlorhexidine 2% Cloths 1 Application(s) Topical <User Schedule>  cholecalciferol 400 Unit(s) Oral daily  cyanocobalamin 1000 MICROGram(s) Oral daily  dextrose 5%. 1000 milliLiter(s) (50 mL/Hr) IV Continuous <Continuous>  dextrose 5%. 1000 milliLiter(s) (100 mL/Hr) IV Continuous <Continuous>  dextrose 50% Injectable 25 Gram(s) IV Push once  dextrose 50% Injectable 12.5 Gram(s) IV Push once  dextrose 50% Injectable 25 Gram(s) IV Push once  furosemide    Tablet 20 milliGRAM(s) Oral daily  glucagon  Injectable 1 milliGRAM(s) IntraMuscular once  hydrocortisone hemorrhoidal Suppository 1 Suppository(s) Rectal at bedtime  insulin glargine Injectable (LANTUS) 18 Unit(s) SubCutaneous at bedtime  insulin lispro (ADMELOG) corrective regimen sliding scale   SubCutaneous Before meals and at bedtime  magnesium oxide 400 milliGRAM(s) Oral three times a day with meals  mepolizumab Subcutaneous Injectable 100 milliGRAM(s) SubCutaneous every 4 weeks  metoprolol tartrate 50 milliGRAM(s) Oral two times a day  montelukast 10 milliGRAM(s) Oral daily  multivitamin 1 Tablet(s) Oral daily  Nephro-carlos 1 Tablet(s) Oral daily  pantoprazole    Tablet 40 milliGRAM(s) Oral every 12 hours  polyethylene glycol 3350 17 Gram(s) Oral daily  saccharomyces boulardii 250 milliGRAM(s) Oral two times a day  senna 2 Tablet(s) Oral at bedtime  simethicone 80 milliGRAM(s) Chew every 6 hours  sucralfate 1 Gram(s) Oral four times a day  tamsulosin 0.4 milliGRAM(s) Oral at bedtime  tiotropium 2.5 MICROgram(s) Inhaler 2 Puff(s) Inhalation daily    MEDICATIONS  (PRN):  albuterol/ipratropium for Nebulization 3 milliLiter(s) Nebulizer every 6 hours PRN Shortness of Breath and/or Wheezing  dextrose Oral Gel 15 Gram(s) Oral once PRN Blood Glucose LESS THAN 70 milliGRAM(s)/deciliter      Allergies    No Known Allergies    Intolerances            Vital Signs Last 24 Hrs  T(C): 36.9 (21 Mar 2023 08:12), Max: 36.9 (21 Mar 2023 08:12)  T(F): 98.5 (21 Mar 2023 08:12), Max: 98.5 (21 Mar 2023 08:12)  HR: 98 (21 Mar 2023 09:00) (64 - 113)  BP: 131/59 (21 Mar 2023 09:00) (94/62 - 140/64)  BP(mean): 85 (21 Mar 2023 09:00) (74 - 94)  RR: 33 (21 Mar 2023 09:00) (15 - 34)  SpO2: 94% (21 Mar 2023 09:00) (91% - 100%)    Parameters below as of 21 Mar 2023 08:00  Patient On (Oxygen Delivery Method): room air        I&O's Summary    20 Mar 2023 07:01  -  21 Mar 2023 07:00  --------------------------------------------------------  IN: 150 mL / OUT: 1500 mL / NET: -1350 mL    21 Mar 2023 07:01  -  21 Mar 2023 10:10  --------------------------------------------------------  IN: 370 mL / OUT: 650 mL / NET: -280 mL          PHYSICAL EXAM:  Constitutional: NAD, awake and alert, well-developed  HEENT: Moist Mucous Membranes, Anicteric  Pulmonary: Non-labored, breath sounds are clear bilaterally, No wheezing, rales or rhonchi  Cardiovascular: Regular, S1 and S2, No murmurs, rubs, gallops or clicks  Gastrointestinal: Bowel Sounds present, soft, nontender.   Lymph: No peripheral edema. No lymphadenopathy.  Skin: No visible rashes or ulcers.  Psych:  Mood & affect appropriate    LABS: All Labs Reviewed:                        10.0   11.88 )-----------( 311      ( 21 Mar 2023 05:33 )             32.8                         10.4   14.78 )-----------( 294      ( 20 Mar 2023 05:56 )             34.5                         10.7   15.73 )-----------( 325      ( 19 Mar 2023 18:50 )             36.1     21 Mar 2023 05:33    142    |  104    |  31     ----------------------------<  137    3.8     |  36     |  1.30   20 Mar 2023 05:56    140    |  104    |  22     ----------------------------<  260    4.0     |  31     |  1.40   19 Mar 2023 18:50    141    |  102    |  17     ----------------------------<  164    3.5     |  33     |  1.30     Ca    9.2        21 Mar 2023 05:33  Ca    9.2        20 Mar 2023 05:56  Ca    9.3        19 Mar 2023 18:50  Phos  3.0       20 Mar 2023 05:56  Phos  3.4       19 Mar 2023 18:50  Mg     1.6       20 Mar 2023 05:56  Mg     1.5       19 Mar 2023 18:50  Mg     1.4       19 Mar 2023 06:05    TPro  6.8    /  Alb  3.1    /  TBili  0.3    /  DBili  x      /  AST  27     /  ALT  29     /  AlkPhos  101    19 Mar 2023 18:50  TPro  5.4    /  Alb  2.5    /  TBili  0.3    /  DBili  x      /  AST  18     /  ALT  24     /  AlkPhos  76     19 Mar 2023 06:05

## 2023-03-21 NOTE — PROGRESS NOTE ADULT - SUBJECTIVE AND OBJECTIVE BOX
patient seen and examined  remains in ICU  sitting in the chair,comfortable pleasant  vitals stable on baseline 02  labs: stable Hb and Cr  Underwent MBS study today  on PO lasix, IV cefepime and azithro  for PICC line tomorrow    Review of Systems:  General:denies fever chills, headache, weakness  HEENT: denies blurry vision,diffculty swallowing, difficulty hearing, tinnitus  Cardiovascular: denies chest pain  ,palpitations  Pulmonary:denies shortness of breath, cough, wheezing, hemoptysis  Gastrointestinal: denies abdominal pain, constipation, diarrhea,nausea , vomiting, hematochezia  : denies hematuria, dysuria, or incontinence  Neurological: denies weakness, numbness , tingling, dizziness, tremors  MSK: denies muscle pain, difficulty ambulating, swelling, back pain  skin: denies skin rash, itching, burning, or  skin lesions  Psychiatrical: denies mood disturbances, anxierty, feeling depressed, depression , or difficulty sleeping    Objective:  Vitals  T(C): 36.4 (03-21-23 @ 13:00), Max: 36.9 (03-21-23 @ 08:12)  HR: 94 (03-21-23 @ 13:00) (64 - 113)  BP: 113/59 (03-21-23 @ 13:00) (93/50 - 139/65)  RR: 23 (03-21-23 @ 13:00) (15 - 34)  SpO2: 95% (03-21-23 @ 13:00) (91% - 100%)    Physical Exam:  General: comfortable, no acute distress  HEENT: Atraumatic, no LAD, trachea midline, PERRLA  Cardiovascular: normal s1s2, no murmurs, gallops or fricition rubs  Pulmonary: clear to ausculation Bilaterally, no wheezing , rhonchi  Gastrointestinal: soft non tender non distended, no masses felt, no organomegally  Muscloskeletal: no lower extremity edema, intact bilateral lower extremity pulses  Neurological: CN II-12 intact. No focal weakness  Psychiatrical: normal mood, cooperative  SKIN: no rash, lesions or ulcers    Labs:                          10.0   11.88 )-----------( 311      ( 21 Mar 2023 05:33 )             32.8     03-21    142  |  104  |  31<H>  ----------------------------<  137<H>  3.8   |  36<H>  |  1.30    Ca    9.2      21 Mar 2023 05:33  Phos  3.0     03-20  Mg     1.6     03-20    TPro  6.8  /  Alb  3.1<L>  /  TBili  0.3  /  DBili  x   /  AST  27  /  ALT  29  /  AlkPhos  101  03-19    LIVER FUNCTIONS - ( 19 Mar 2023 18:50 )  Alb: 3.1 g/dL / Pro: 6.8 g/dL / ALK PHOS: 101 U/L / ALT: 29 U/L / AST: 27 U/L / GGT: x                 Active Medications  MEDICATIONS  (STANDING):  ascorbic acid 500 milliGRAM(s) Oral daily  atorvastatin 20 milliGRAM(s) Oral at bedtime  azithromycin   Tablet 250 milliGRAM(s) Oral <User Schedule>  budesonide 160 MICROgram(s)/formoterol 4.5 MICROgram(s) Inhaler 2 Puff(s) Inhalation two times a day  cefepime   IVPB 2000 milliGRAM(s) IV Intermittent every 12 hours  chlorhexidine 2% Cloths 1 Application(s) Topical <User Schedule>  cholecalciferol 400 Unit(s) Oral daily  cyanocobalamin 1000 MICROGram(s) Oral daily  dextrose 5%. 1000 milliLiter(s) (50 mL/Hr) IV Continuous <Continuous>  dextrose 5%. 1000 milliLiter(s) (100 mL/Hr) IV Continuous <Continuous>  dextrose 50% Injectable 25 Gram(s) IV Push once  dextrose 50% Injectable 25 Gram(s) IV Push once  dextrose 50% Injectable 12.5 Gram(s) IV Push once  enoxaparin Injectable 40 milliGRAM(s) SubCutaneous every 24 hours  furosemide    Tablet 20 milliGRAM(s) Oral daily  glucagon  Injectable 1 milliGRAM(s) IntraMuscular once  hydrocortisone hemorrhoidal Suppository 1 Suppository(s) Rectal at bedtime  insulin glargine Injectable (LANTUS) 18 Unit(s) SubCutaneous at bedtime  insulin lispro (ADMELOG) corrective regimen sliding scale   SubCutaneous Before meals and at bedtime  magnesium oxide 400 milliGRAM(s) Oral three times a day with meals  mepolizumab Subcutaneous Injectable 100 milliGRAM(s) SubCutaneous every 4 weeks  metoprolol tartrate 50 milliGRAM(s) Oral two times a day  montelukast 10 milliGRAM(s) Oral daily  multivitamin 1 Tablet(s) Oral daily  Nephro-carlos 1 Tablet(s) Oral daily  pantoprazole    Tablet 40 milliGRAM(s) Oral every 12 hours  polyethylene glycol 3350 17 Gram(s) Oral daily  saccharomyces boulardii 250 milliGRAM(s) Oral two times a day  senna 2 Tablet(s) Oral at bedtime  simethicone 80 milliGRAM(s) Chew every 6 hours  sucralfate 1 Gram(s) Oral four times a day  tamsulosin 0.4 milliGRAM(s) Oral at bedtime  tiotropium 2.5 MICROgram(s) Inhaler 2 Puff(s) Inhalation daily    MEDICATIONS  (PRN):  albuterol/ipratropium for Nebulization 3 milliLiter(s) Nebulizer every 6 hours PRN Shortness of Breath and/or Wheezing  dextrose Oral Gel 15 Gram(s) Oral once PRN Blood Glucose LESS THAN 70 milliGRAM(s)/deciliter

## 2023-03-21 NOTE — PROGRESS NOTE ADULT - ASSESSMENT
82 year old male with history of CAD s/p CABG, HLD, HTN, DM 2, COPD on home O2, PVD/LE stents, remote hx of L femoral fx with complicated course/multiple procedures/chronic OM sent by ID with draining sinus from left thigh.     1) Acute on chronic hypoxemic respiratory failure  - likely volume overload from transfusion  - Continue Lasix   - Weaned off supplemental oxygen    2) Chronic L femur OM/L thigh abscess   - IR was discussed and no drainage was recommended  - Continue Cefepime  - Needs PICC this week for long term antibiotics  - ID follow up noted     3) Acute Blood Loss Anemia   - Likely due to GIB  - s/p 6 PRBCs  - s/p EGD on 3/13: showing gastric ulcer; gastritis; gastric ulcer; hiatal hernia  - s/p colonoscopy on 3/17: showing cecal polyp/avm s/p bx and clip   - Continue Protonix and Carafate   - Added Senna, Miralax and Anusol Suppositories for hemorrhoids   - GI follow up noted     4) History of Paroxysmal Atrial Tachycardia   - Continue Metoprolol  - Eliquis on hold, resume once cleared by GI    5) CAD / PVD  - Antiplatelets on hold, resume once cleared by GI  - Continue Lipitor and Metoprolol   - Cardio follow up noted     6) DM 2  - A1c 8  - Accu checks and ISS  - Increase Lantus to 18 units      7) Asthma / COPD / COURTNEY  - Continue Symbicort and Spiriva   - On Nucala every 4 weeks   - Nocturnal NIV  - Pulmonary follow up noted     8) Hypokalemia / Hypomagnesemia   - Replete    DVT Prophylaxis -- Venodyne     Dispo: now in ICU level of care :   Updated patient's wife at bedside.  In  speaking with nursing and patients wife, patient is targeted for  discharge home tomorrow with PICC 82 year old male with history of CAD s/p CABG, HLD, HTN, DM 2, COPD on home O2, PVD/LE stents, remote hx of L femoral fx with complicated course/multiple procedures/chronic OM sent by ID with draining sinus from left thigh.     1) Acute on chronic hypoxemic respiratory failure  - likely volume overload from transfusion  - Continue Lasix   - Weaned off supplemental oxygen    2) Chronic L femur OM/L thigh abscess   - IR was discussed and no drainage was recommended  - Continue Cefepime  - Needs PICC this week for long term antibiotics  - ID follow up noted     3) Acute Blood Loss Anemia   - Likely due to GIB  - s/p 6 PRBCs  - s/p EGD on 3/13: showing gastric ulcer; gastritis; gastric ulcer; hiatal hernia  - s/p colonoscopy on 3/17: showing cecal polyp/avm s/p bx and clip   - Continue Protonix and Carafate   - Added Senna, Miralax and Anusol Suppositories for hemorrhoids   - GI follow up noted     4) History of Paroxysmal Atrial Tachycardia   - Continue Metoprolol  - Eliquis on hold, resume once cleared by GI    5) CAD / PVD  - Antiplatelets on hold, resume once cleared by GI  - Continue Lipitor and Metoprolol   - Cardio follow up noted     6) DM 2  - A1c 8  - Accu checks and ISS  - Increase Lantus to 18 units      7) Asthma / COPD / COURTNEY  - Continue Symbicort and Spiriva   - On Nucala every 4 weeks   - Nocturnal NIV  - Pulmonary follow up noted       DVT Prophylaxis -- on lovenox 40    Dispo: now in ICU level of care :   Updated patient's wife at bedside.  In  speaking with nursing and patients wife, patient is targeted for  discharge home tomorrow with PICC

## 2023-03-21 NOTE — PROGRESS NOTE ADULT - SUBJECTIVE AND OBJECTIVE BOX
[facility]  Rich Sky PA-C  [address] [place] [address]  [phone]      INTERVAL HPI/OVERNIGHT EVENTS:  Pt s/e in ICU with wife at bedside  Reports no overt GI bleeding  MBS noted    MEDICATIONS  (STANDING):  ascorbic acid 500 milliGRAM(s) Oral daily  atorvastatin 20 milliGRAM(s) Oral at bedtime  azithromycin   Tablet 250 milliGRAM(s) Oral <User Schedule>  budesonide 160 MICROgram(s)/formoterol 4.5 MICROgram(s) Inhaler 2 Puff(s) Inhalation two times a day  cefepime   IVPB 2000 milliGRAM(s) IV Intermittent every 12 hours  chlorhexidine 2% Cloths 1 Application(s) Topical <User Schedule>  cholecalciferol 400 Unit(s) Oral daily  cyanocobalamin 1000 MICROGram(s) Oral daily  dextrose 5%. 1000 milliLiter(s) (50 mL/Hr) IV Continuous <Continuous>  dextrose 5%. 1000 milliLiter(s) (100 mL/Hr) IV Continuous <Continuous>  dextrose 50% Injectable 25 Gram(s) IV Push once  dextrose 50% Injectable 12.5 Gram(s) IV Push once  dextrose 50% Injectable 25 Gram(s) IV Push once  enoxaparin Injectable 40 milliGRAM(s) SubCutaneous every 24 hours  furosemide    Tablet 20 milliGRAM(s) Oral daily  glucagon  Injectable 1 milliGRAM(s) IntraMuscular once  hydrocortisone hemorrhoidal Suppository 1 Suppository(s) Rectal at bedtime  insulin glargine Injectable (LANTUS) 18 Unit(s) SubCutaneous at bedtime  insulin lispro (ADMELOG) corrective regimen sliding scale   SubCutaneous Before meals and at bedtime  magnesium oxide 400 milliGRAM(s) Oral three times a day with meals  mepolizumab Subcutaneous Injectable 100 milliGRAM(s) SubCutaneous every 4 weeks  metoprolol tartrate 50 milliGRAM(s) Oral two times a day  montelukast 10 milliGRAM(s) Oral daily  multivitamin 1 Tablet(s) Oral daily  Nephro-carlos 1 Tablet(s) Oral daily  pantoprazole    Tablet 40 milliGRAM(s) Oral every 12 hours  polyethylene glycol 3350 17 Gram(s) Oral daily  saccharomyces boulardii 250 milliGRAM(s) Oral two times a day  senna 2 Tablet(s) Oral at bedtime  simethicone 80 milliGRAM(s) Chew every 6 hours  sucralfate 1 Gram(s) Oral four times a day  tamsulosin 0.4 milliGRAM(s) Oral at bedtime  tiotropium 2.5 MICROgram(s) Inhaler 2 Puff(s) Inhalation daily    MEDICATIONS  (PRN):  albuterol/ipratropium for Nebulization 3 milliLiter(s) Nebulizer every 6 hours PRN Shortness of Breath and/or Wheezing  dextrose Oral Gel 15 Gram(s) Oral once PRN Blood Glucose LESS THAN 70 milliGRAM(s)/deciliter      Allergies    No Known Allergies      PHYSICAL EXAM:   Vital Signs:  Vital Signs Last 24 Hrs  T(C): 36.9 (21 Mar 2023 08:12), Max: 36.9 (21 Mar 2023 08:12)  T(F): 98.5 (21 Mar 2023 08:12), Max: 98.5 (21 Mar 2023 08:12)  HR: 89 (21 Mar 2023 10:00) (64 - 113)  BP: 93/50 (21 Mar 2023 10:00) (93/50 - 139/65)  BP(mean): 67 (21 Mar 2023 10:00) (67 - 94)  RR: 27 (21 Mar 2023 10:00) (15 - 34)  SpO2: 94% (21 Mar 2023 10:00) (91% - 100%)    Parameters below as of 21 Mar 2023 08:00  Patient On (Oxygen Delivery Method): room air      Daily     Daily Weight in k (21 Mar 2023 06:00)    GENERAL:  Appears stated age  HEENT:  NC/AT  CHEST:  Full & symmetric excursion  HEART:  Regular rhythm  ABDOMEN:  Soft, non-tender, non-distended  EXTEREMITIES:  no cyanosis  SKIN:  No rash  NEURO:  Alert      LABS:                        10.0   11.88 )-----------( 311      ( 21 Mar 2023 05:33 )             32.8     03-21    142  |  104  |  31<H>  ----------------------------<  137<H>  3.8   |  36<H>  |  1.30    Ca    9.2      21 Mar 2023 05:33  Phos  3.0     03-20  Mg     1.6     03-20    TPro  6.8  /  Alb  3.1<L>  /  TBili  0.3  /  DBili  x   /  AST  27  /  ALT  29  /  AlkPhos  101  03-19

## 2023-03-21 NOTE — PROGRESS NOTE ADULT - SUBJECTIVE AND OBJECTIVE BOX
Health system  INFECTIOUS DISEASES   [address] [place] [address]  Tel: [phone]     Fax: [phone]  ========================================================  MD Noman Perry Kaushal, MD Cho, Michelle, MD Sunjit, Jaspal, MD  ========================================================    [facility]-[id number]  [facility]– [place]     Follow up: left thigh abscess and OM    No fever, some discharge from left thigh, it is much less than last week.   Had blood in stool and drop in H/H so had transfusion multiple times, and EGD on 3/13 showed gastritis and peptic ulcer.  Colonoscopy negative for active bleeding on 3/17. Now H/H stable.  Was transferred to ICU for monitoring after aspirating food during meal. Now doing well, not on o2.     PAST MEDICAL & SURGICAL HISTORY:  Diabetes Mellitus, Type II  CAD (Coronary Artery Disease)  s/p 3v CABG ; stents placed in [place] in   Dyslipidemia  Osteomyelitis  COPD (chronic obstructive pulmonary disease)  on 2L at home and BiPAP at night; intubated   Hypertension  PVD (peripheral vascular disease)  History of PAT (paroxysmal atrial tachycardia)  Asthma with COPD  BPH (benign prostatic hyperplasia)  Acute osteomyelitis  CABG (Coronary Artery Bypass Graft)    Compound fracture  left leg  S/P primary angioplasty with coronary stent  H/O drainage of abscess  Left femur 2021    Social Hx: No current smoking, ETOH or drugs     FAMILY HISTORY:  Family history of diabetes mellitus (Sibling)    Family hx of lung cancer  brother,  age 82, used to smoke with pt    Allergies  No Known Allergies    Intolerances  shellfish (Nausea)    Antibiotics:  Ciprofloxacin      REVIEW OF SYSTEMS:  CONSTITUTIONAL:  No Fever or chills  HEENT:  No diplopia or blurred vision.  No sore throat or runny nose.  CARDIOVASCULAR:  No chest pain or SOB.  RESPIRATORY:  No cough, shortness of breath, PND or orthopnea.  GASTROINTESTINAL:  No nausea, vomiting or diarrhea.  GENITOURINARY:  No dysuria, frequency or urgency. No Blood in urine  MUSCULOSKELETAL:  left thigh pain and drainage   SKIN:  No change in skin, hair or nails.  NEUROLOGIC:  No paresthesias or weakness.  PSYCHIATRIC:  No disorder of thought or mood.  ENDOCRINE:  No heat or cold intolerance, polyuria or polydipsia.  HEMATOLOGICAL:  No easy bruising or bleeding.     Physical Exam:  Vital Signs Last 24 Hrs  T(C): 36.9 (21 Mar 2023 08:12), Max: 36.9 (21 Mar 2023 08:12)  T(F): 98.5 (21 Mar 2023 08:12), Max: 98.5 (21 Mar 2023 08:12)  HR: 89 (21 Mar 2023 10:00) (64 - 113)  BP: 93/50 (21 Mar 2023 10:00) (93/50 - 140/64)  BP(mean): 67 (21 Mar 2023 10:00) (67 - 94)  RR: 27 (21 Mar 2023 10:00) (15 - 34)  SpO2: 94% (21 Mar 2023 10:00) (91% - 100%)  Parameters below as of 21 Mar 2023 08:00  Patient On (Oxygen Delivery Method): room air  GEN: NAD  HEENT: normocephalic and atraumatic. EOMI. PERRL.    NECK: Supple.  No lymphadenopathy   LUNGS: poor air movement   HEART: Regular rate and rhythm   ABDOMEN: Soft, nontender, and nondistended.  Positive bowel sounds.    : No CVA tenderness  EXTREMITIES: left thigh with scar in lateral side with fluctuation and small opening with yellow discharge   NEUROLOGIC: grossly intact.  PSYCHIATRIC: Appropriate affect .  SKIN: No rash     Labs:                        10.0   11.88 )-----------( 311      ( 21 Mar 2023 05:33 )             32.8     03-21    142  |  104  |  31<H>  ----------------------------<  137<H>  3.8   |  36<H>  |  1.30    Ca    9.2      21 Mar 2023 05:33  Phos  3.0     03-20  Mg     1.6     03-20    TPro  6.8  /  Alb  3.1<L>  /  TBili  0.3  /  DBili  x   /  AST  27  /  ALT  29  /  AlkPhos  101      Culture - Abscess with Gram Stain (collected 03-10-23 @ 11:50)  Source: .Abscess left leg  Final Report (03-15-23 @ 19:04):    Few Corynebacterium jeikeium    "Susceptibilities not performed"    Culture - Abscess with Gram Stain (collected 23 @ 13:50)  Source: .Abscess left thigh  Final Report (23 @ 14:49):    Moderate Coag Negative Staphylococcus "Susceptibilities not performed"    Multiple Morphological Strains    WBC Count: 11.88 K/uL (23 @ 05:33)  WBC Count: 14.78 K/uL (23 @ 05:56)  WBC Count: 15.73 K/uL (23 @ 18:50)  WBC Count: 7.42 K/uL (23 @ 06:05)  WBC Count: 8.15 K/uL (23 @ 07:05)  WBC Count: 7.24 K/uL (23 @ 05:54)    Creatinine, Serum: 1.30 mg/dL (23 @ 05:33)  Creatinine, Serum: 1.40 mg/dL (23 @ 05:56)  Creatinine, Serum: 1.30 mg/dL (23 @ 18:50)  Creatinine, Serum: 1.10 mg/dL (23 @ 06:05)  Creatinine, Serum: 1.10 mg/dL (23 @ 07:05)  Creatinine, Serum: 0.98 mg/dL (23 @ 05:54)     COVID-19 PCR: NotDetec (23 @ 14:48)  COVID-19 PCR: NotDetec (23 @ 18:44)  SARS-CoV-2 Result: NotDetec (23 @ 15:53)    All imaging and other data have been reviewed.  < from: MR Femur No Cont, Left (22 @ 13:40) >  IMPRESSION:  Chronic osteomyelitis with deformity of bone and with scattered areas of   T2 hyperintensity, less prominent than on the previous MRI of 2021,   however cannot exclude superimposed acute osteomyelitis/intraosseous   abscess.  Fluid tract extending from the chronic bony defect is contiguous with a   soft tissue abscess centered deep to the vastus intermedius measuring  12.9 cm craniocaudally, with surrounding surrounding muscle and soft   tissue edema.    Assessment and Plan:   81yo man with PMH of HTN, COPD on home O2, CAD s/p CABG, PVD s/p stents in b/l legs and left femoral fracture more than 50 years ago, was admitted at [facility] in 2021 with left  leg pain on the site of old fracture after CT showed possible intramedullary abscess. He had pain and infection in the old fracture area at least 3-4 times in the past, had multiple surgeries   and drainage of area with a long course of antibiotic treatment.  MRI showed collection, intraosseous abscess  S/P IR drain placement on 21  IR culture NGTD but had another culture with enterobacter.   Completed 6 weeks of ceftriaxone 2gm with PICC line in 2022  He was seen in the clinic and was started on suppressive ciprofloxacin for good oral bioavailability and also based on the culture.  MRI 2022 done showed 12.9cm collection with OM.   He stopped cipro sometimes last year and now feels more pain and swelling in area and started draining again.   Now MRI with 22cm collection.     Had drop in H/H and rectal bleeding for which had EGD 3/13 with Gastritis and peptic ulcer and also colonoscopy on 3/17 with no active bleeding. After multiple transfusions now stable H/H.  Over the weekend was transferred to ICU after aspirating food but now doing well. Not on any supplemental o2.   For the Left thigh USG was done showing 4.5cm collection (MRI that reported 22cmm, as per IR it was whole infected areas including tissue and bone), so didn't advise any intervention or drain   placement. At this time we just can treat with ABx.     Recommendations:  - Blood cultures negative   - Wound discharge sent for culture twice both times with skin shayy, CoNS and corynebacterium   - GI work up noted s/p EGD on 3/13 with gastritis and peptic ulcer, s/p colonoscopy 3/17 no active bleeding  - Continue Cefepime 2gm q8, will follow Creat, is going up, will adjust accordingly  - Will place PICC to prepare him for discharge, will treat 6 weeks and after that restart Ciprofloxacin indefinitely     Will follow.    Brandi العلي MD  Division of Infectious Diseases   Please call ID service at [phone] with any question.      35 minutes spent on total encounter assessing patient, examination, chart review, counseling and coordinating care by the attending physician/nurse/care manager.

## 2023-03-22 ENCOUNTER — TRANSCRIPTION ENCOUNTER (OUTPATIENT)
Age: 83
End: 2023-03-22

## 2023-03-22 VITALS — HEART RATE: 84 BPM | RESPIRATION RATE: 27 BRPM | OXYGEN SATURATION: 98 %

## 2023-03-22 LAB
ANION GAP SERPL CALC-SCNC: 3 MMOL/L — LOW (ref 5–17)
APTT BLD: 32 SEC — SIGNIFICANT CHANGE UP (ref 27.5–35.5)
BUN SERPL-MCNC: 23 MG/DL — SIGNIFICANT CHANGE UP (ref 7–23)
CALCIUM SERPL-MCNC: 8.8 MG/DL — SIGNIFICANT CHANGE UP (ref 8.5–10.1)
CHLORIDE SERPL-SCNC: 105 MMOL/L — SIGNIFICANT CHANGE UP (ref 96–108)
CO2 SERPL-SCNC: 34 MMOL/L — HIGH (ref 22–31)
CREAT SERPL-MCNC: 1.1 MG/DL — SIGNIFICANT CHANGE UP (ref 0.5–1.3)
EGFR: 67 ML/MIN/1.73M2 — SIGNIFICANT CHANGE UP
GLUCOSE SERPL-MCNC: 109 MG/DL — HIGH (ref 70–99)
HCT VFR BLD CALC: 30.9 % — LOW (ref 39–50)
HGB BLD-MCNC: 9.3 G/DL — LOW (ref 13–17)
INR BLD: 1.07 RATIO — SIGNIFICANT CHANGE UP (ref 0.88–1.16)
MAGNESIUM SERPL-MCNC: 2 MG/DL — SIGNIFICANT CHANGE UP (ref 1.6–2.6)
MCHC RBC-ENTMCNC: 27.8 PG — SIGNIFICANT CHANGE UP (ref 27–34)
MCHC RBC-ENTMCNC: 30.1 GM/DL — LOW (ref 32–36)
MCV RBC AUTO: 92.2 FL — SIGNIFICANT CHANGE UP (ref 80–100)
NRBC # BLD: 0 /100 WBCS — SIGNIFICANT CHANGE UP (ref 0–0)
PHOSPHATE SERPL-MCNC: 2.6 MG/DL — SIGNIFICANT CHANGE UP (ref 2.5–4.5)
PLATELET # BLD AUTO: 273 K/UL — SIGNIFICANT CHANGE UP (ref 150–400)
POTASSIUM SERPL-MCNC: 3.3 MMOL/L — LOW (ref 3.5–5.3)
POTASSIUM SERPL-SCNC: 3.3 MMOL/L — LOW (ref 3.5–5.3)
PROTHROM AB SERPL-ACNC: 12.5 SEC — SIGNIFICANT CHANGE UP (ref 10.5–13.4)
RBC # BLD: 3.35 M/UL — LOW (ref 4.2–5.8)
RBC # FLD: 14.6 % — HIGH (ref 10.3–14.5)
SODIUM SERPL-SCNC: 142 MMOL/L — SIGNIFICANT CHANGE UP (ref 135–145)
WBC # BLD: 8.43 K/UL — SIGNIFICANT CHANGE UP (ref 3.8–10.5)
WBC # FLD AUTO: 8.43 K/UL — SIGNIFICANT CHANGE UP (ref 3.8–10.5)

## 2023-03-22 PROCEDURE — 87040 BLOOD CULTURE FOR BACTERIA: CPT

## 2023-03-22 PROCEDURE — 87635 SARS-COV-2 COVID-19 AMP PRB: CPT

## 2023-03-22 PROCEDURE — 92611 MOTION FLUOROSCOPY/SWALLOW: CPT

## 2023-03-22 PROCEDURE — 86923 COMPATIBILITY TEST ELECTRIC: CPT

## 2023-03-22 PROCEDURE — 86850 RBC ANTIBODY SCREEN: CPT

## 2023-03-22 PROCEDURE — 87205 SMEAR GRAM STAIN: CPT

## 2023-03-22 PROCEDURE — 94660 CPAP INITIATION&MGMT: CPT

## 2023-03-22 PROCEDURE — 99233 SBSQ HOSP IP/OBS HIGH 50: CPT

## 2023-03-22 PROCEDURE — 36573 INSJ PICC RS&I 5 YR+: CPT

## 2023-03-22 PROCEDURE — 81003 URINALYSIS AUTO W/O SCOPE: CPT

## 2023-03-22 PROCEDURE — 84484 ASSAY OF TROPONIN QUANT: CPT

## 2023-03-22 PROCEDURE — 73552 X-RAY EXAM OF FEMUR 2/>: CPT

## 2023-03-22 PROCEDURE — 94760 N-INVAS EAR/PLS OXIMETRY 1: CPT

## 2023-03-22 PROCEDURE — 87637 SARSCOV2&INF A&B&RSV AMP PRB: CPT

## 2023-03-22 PROCEDURE — 87070 CULTURE OTHR SPECIMN AEROBIC: CPT

## 2023-03-22 PROCEDURE — 85018 HEMOGLOBIN: CPT

## 2023-03-22 PROCEDURE — 76937 US GUIDE VASCULAR ACCESS: CPT | Mod: 26

## 2023-03-22 PROCEDURE — 80053 COMPREHEN METABOLIC PANEL: CPT

## 2023-03-22 PROCEDURE — 77001 FLUOROGUIDE FOR VEIN DEVICE: CPT

## 2023-03-22 PROCEDURE — 97110 THERAPEUTIC EXERCISES: CPT

## 2023-03-22 PROCEDURE — P9016: CPT

## 2023-03-22 PROCEDURE — 99285 EMERGENCY DEPT VISIT HI MDM: CPT | Mod: 25

## 2023-03-22 PROCEDURE — 76937 US GUIDE VASCULAR ACCESS: CPT

## 2023-03-22 PROCEDURE — C1889: CPT

## 2023-03-22 PROCEDURE — 88305 TISSUE EXAM BY PATHOLOGIST: CPT

## 2023-03-22 PROCEDURE — 97530 THERAPEUTIC ACTIVITIES: CPT

## 2023-03-22 PROCEDURE — 87077 CULTURE AEROBIC IDENTIFY: CPT

## 2023-03-22 PROCEDURE — 86900 BLOOD TYPING SEROLOGIC ABO: CPT

## 2023-03-22 PROCEDURE — 73718 MRI LOWER EXTREMITY W/O DYE: CPT

## 2023-03-22 PROCEDURE — 85025 COMPLETE CBC W/AUTO DIFF WBC: CPT

## 2023-03-22 PROCEDURE — 36415 COLL VENOUS BLD VENIPUNCTURE: CPT

## 2023-03-22 PROCEDURE — 36430 TRANSFUSION BLD/BLD COMPNT: CPT

## 2023-03-22 PROCEDURE — 85610 PROTHROMBIN TIME: CPT

## 2023-03-22 PROCEDURE — 93005 ELECTROCARDIOGRAM TRACING: CPT

## 2023-03-22 PROCEDURE — 82962 GLUCOSE BLOOD TEST: CPT

## 2023-03-22 PROCEDURE — 84100 ASSAY OF PHOSPHORUS: CPT

## 2023-03-22 PROCEDURE — 83605 ASSAY OF LACTIC ACID: CPT

## 2023-03-22 PROCEDURE — 88313 SPECIAL STAINS GROUP 2: CPT

## 2023-03-22 PROCEDURE — 85027 COMPLETE CBC AUTOMATED: CPT

## 2023-03-22 PROCEDURE — 83735 ASSAY OF MAGNESIUM: CPT

## 2023-03-22 PROCEDURE — 88342 IMHCHEM/IMCYTCHM 1ST ANTB: CPT

## 2023-03-22 PROCEDURE — 97162 PT EVAL MOD COMPLEX 30 MIN: CPT

## 2023-03-22 PROCEDURE — 87507 IADNA-DNA/RNA PROBE TQ 12-25: CPT

## 2023-03-22 PROCEDURE — 97116 GAIT TRAINING THERAPY: CPT

## 2023-03-22 PROCEDURE — 86901 BLOOD TYPING SEROLOGIC RH(D): CPT

## 2023-03-22 PROCEDURE — 74230 X-RAY XM SWLNG FUNCJ C+: CPT

## 2023-03-22 PROCEDURE — 97112 NEUROMUSCULAR REEDUCATION: CPT

## 2023-03-22 PROCEDURE — 93010 ELECTROCARDIOGRAM REPORT: CPT

## 2023-03-22 PROCEDURE — 99291 CRITICAL CARE FIRST HOUR: CPT

## 2023-03-22 PROCEDURE — 80048 BASIC METABOLIC PNL TOTAL CA: CPT

## 2023-03-22 PROCEDURE — 85014 HEMATOCRIT: CPT

## 2023-03-22 PROCEDURE — 76882 US LMTD JT/FCL EVL NVASC XTR: CPT

## 2023-03-22 PROCEDURE — U0005: CPT

## 2023-03-22 PROCEDURE — 87086 URINE CULTURE/COLONY COUNT: CPT

## 2023-03-22 PROCEDURE — 86140 C-REACTIVE PROTEIN: CPT

## 2023-03-22 PROCEDURE — 71045 X-RAY EXAM CHEST 1 VIEW: CPT

## 2023-03-22 PROCEDURE — 99233 SBSQ HOSP IP/OBS HIGH 50: CPT | Mod: GC

## 2023-03-22 PROCEDURE — 94640 AIRWAY INHALATION TREATMENT: CPT

## 2023-03-22 PROCEDURE — 83036 HEMOGLOBIN GLYCOSYLATED A1C: CPT

## 2023-03-22 PROCEDURE — 92610 EVALUATE SWALLOWING FUNCTION: CPT

## 2023-03-22 PROCEDURE — 85730 THROMBOPLASTIN TIME PARTIAL: CPT

## 2023-03-22 PROCEDURE — U0003: CPT

## 2023-03-22 PROCEDURE — 85652 RBC SED RATE AUTOMATED: CPT

## 2023-03-22 PROCEDURE — 90750 HZV VACC RECOMBINANT IM: CPT

## 2023-03-22 PROCEDURE — C1751: CPT

## 2023-03-22 PROCEDURE — 82803 BLOOD GASES ANY COMBINATION: CPT

## 2023-03-22 PROCEDURE — 88312 SPECIAL STAINS GROUP 1: CPT

## 2023-03-22 RX ORDER — INSULIN ASPART 100 [IU]/ML
2 INJECTION, SOLUTION SUBCUTANEOUS
Qty: 0 | Refills: 0 | DISCHARGE

## 2023-03-22 RX ORDER — POTASSIUM CHLORIDE 20 MEQ
20 PACKET (EA) ORAL
Refills: 0 | Status: COMPLETED | OUTPATIENT
Start: 2023-03-22 | End: 2023-03-22

## 2023-03-22 RX ORDER — SODIUM CHLORIDE 9 MG/ML
10 INJECTION INTRAMUSCULAR; INTRAVENOUS; SUBCUTANEOUS
Refills: 0 | Status: DISCONTINUED | OUTPATIENT
Start: 2023-03-22 | End: 2023-03-22

## 2023-03-22 RX ORDER — FUROSEMIDE 40 MG
1 TABLET ORAL
Qty: 0 | Refills: 0 | DISCHARGE
Start: 2023-03-22

## 2023-03-22 RX ORDER — INSULIN GLARGINE 100 [IU]/ML
10 INJECTION, SOLUTION SUBCUTANEOUS
Qty: 9 | Refills: 0
Start: 2023-03-22 | End: 2023-04-20

## 2023-03-22 RX ORDER — CHLORHEXIDINE GLUCONATE 213 G/1000ML
1 SOLUTION TOPICAL
Refills: 0 | Status: DISCONTINUED | OUTPATIENT
Start: 2023-03-22 | End: 2023-03-22

## 2023-03-22 RX ORDER — PANTOPRAZOLE SODIUM 20 MG/1
1 TABLET, DELAYED RELEASE ORAL
Qty: 60 | Refills: 0
Start: 2023-03-22 | End: 2023-04-20

## 2023-03-22 RX ORDER — METOPROLOL TARTRATE 50 MG
5 TABLET ORAL ONCE
Refills: 0 | Status: COMPLETED | OUTPATIENT
Start: 2023-03-22 | End: 2023-03-22

## 2023-03-22 RX ORDER — SUCRALFATE 1 G
1 TABLET ORAL
Qty: 120 | Refills: 0
Start: 2023-03-22 | End: 2023-04-20

## 2023-03-22 RX ORDER — AZITHROMYCIN 500 MG/1
1 TABLET, FILM COATED ORAL
Qty: 0 | Refills: 0 | DISCHARGE

## 2023-03-22 RX ORDER — CEFEPIME 1 G/1
2 INJECTION, POWDER, FOR SOLUTION INTRAMUSCULAR; INTRAVENOUS
Qty: 0 | Refills: 0 | DISCHARGE
Start: 2023-03-22 | End: 2023-04-16

## 2023-03-22 RX ORDER — ZOSTER VACCINE RECOMBINANT, ADJUVANTED 50 MCG/0.5
0.5 KIT INTRAMUSCULAR ONCE
Refills: 0 | Status: COMPLETED | OUTPATIENT
Start: 2023-03-22 | End: 2023-03-22

## 2023-03-22 RX ORDER — POTASSIUM CHLORIDE 20 MEQ
40 PACKET (EA) ORAL EVERY 4 HOURS
Refills: 0 | Status: DISCONTINUED | OUTPATIENT
Start: 2023-03-22 | End: 2023-03-22

## 2023-03-22 RX ADMIN — Medication 100 MILLIEQUIVALENT(S): at 12:27

## 2023-03-22 RX ADMIN — CEFEPIME 100 MILLIGRAM(S): 1 INJECTION, POWDER, FOR SOLUTION INTRAMUSCULAR; INTRAVENOUS at 05:06

## 2023-03-22 RX ADMIN — Medication 1 GRAM(S): at 17:08

## 2023-03-22 RX ADMIN — Medication 100 MILLIEQUIVALENT(S): at 11:26

## 2023-03-22 RX ADMIN — SIMETHICONE 80 MILLIGRAM(S): 80 TABLET, CHEWABLE ORAL at 05:03

## 2023-03-22 RX ADMIN — Medication 1 TABLET(S): at 12:26

## 2023-03-22 RX ADMIN — SIMETHICONE 80 MILLIGRAM(S): 80 TABLET, CHEWABLE ORAL at 12:26

## 2023-03-22 RX ADMIN — PREGABALIN 1000 MICROGRAM(S): 225 CAPSULE ORAL at 12:27

## 2023-03-22 RX ADMIN — PANTOPRAZOLE SODIUM 40 MILLIGRAM(S): 20 TABLET, DELAYED RELEASE ORAL at 17:08

## 2023-03-22 RX ADMIN — Medication 250 MILLIGRAM(S): at 05:03

## 2023-03-22 RX ADMIN — MAGNESIUM OXIDE 400 MG ORAL TABLET 400 MILLIGRAM(S): 241.3 TABLET ORAL at 08:08

## 2023-03-22 RX ADMIN — CEFEPIME 100 MILLIGRAM(S): 1 INJECTION, POWDER, FOR SOLUTION INTRAMUSCULAR; INTRAVENOUS at 16:05

## 2023-03-22 RX ADMIN — Medication 250 MILLIGRAM(S): at 17:08

## 2023-03-22 RX ADMIN — Medication 50 MILLIGRAM(S): at 05:03

## 2023-03-22 RX ADMIN — Medication 20 MILLIGRAM(S): at 05:03

## 2023-03-22 RX ADMIN — MAGNESIUM OXIDE 400 MG ORAL TABLET 400 MILLIGRAM(S): 241.3 TABLET ORAL at 17:12

## 2023-03-22 RX ADMIN — PANTOPRAZOLE SODIUM 40 MILLIGRAM(S): 20 TABLET, DELAYED RELEASE ORAL at 05:03

## 2023-03-22 RX ADMIN — ENOXAPARIN SODIUM 40 MILLIGRAM(S): 100 INJECTION SUBCUTANEOUS at 17:01

## 2023-03-22 RX ADMIN — Medication 1 GRAM(S): at 12:26

## 2023-03-22 RX ADMIN — TIOTROPIUM BROMIDE 2 PUFF(S): 18 CAPSULE ORAL; RESPIRATORY (INHALATION) at 08:03

## 2023-03-22 RX ADMIN — Medication 100 MILLIEQUIVALENT(S): at 13:34

## 2023-03-22 RX ADMIN — Medication 40 MILLIEQUIVALENT(S): at 08:04

## 2023-03-22 RX ADMIN — AZITHROMYCIN 250 MILLIGRAM(S): 500 TABLET, FILM COATED ORAL at 12:27

## 2023-03-22 RX ADMIN — MONTELUKAST 10 MILLIGRAM(S): 4 TABLET, CHEWABLE ORAL at 12:26

## 2023-03-22 RX ADMIN — ZOSTER VACCINE RECOMBINANT, ADJUVANTED 0.5 MILLILITER(S): KIT at 16:59

## 2023-03-22 RX ADMIN — Medication 5 MILLIGRAM(S): at 11:31

## 2023-03-22 RX ADMIN — Medication 400 UNIT(S): at 12:27

## 2023-03-22 RX ADMIN — SIMETHICONE 80 MILLIGRAM(S): 80 TABLET, CHEWABLE ORAL at 17:09

## 2023-03-22 RX ADMIN — Medication 1: at 17:13

## 2023-03-22 RX ADMIN — Medication 500 MILLIGRAM(S): at 12:26

## 2023-03-22 RX ADMIN — BUDESONIDE AND FORMOTEROL FUMARATE DIHYDRATE 2 PUFF(S): 160; 4.5 AEROSOL RESPIRATORY (INHALATION) at 08:03

## 2023-03-22 RX ADMIN — Medication 1 GRAM(S): at 05:03

## 2023-03-22 NOTE — CONSULT NOTE ADULT - SUBJECTIVE AND OBJECTIVE BOX
Patient is a 82y old  Male who presents with a chief complaint of Left  thigh draining sinus (22 Mar 2023 11:29)      Reason For Consult: dm2 uncontrolled    HPI:  82 yr male with history of Hypertension, COPD home oxygen dependent, CAD CABG , chronic left femor osteomyelitis since traumatic left femoral fracture in .   Patient follow with ID dr العلي for mgt of his chronic femoral osteomyelitis . Has abscess and drainage in 2022. Present with one day of new yellowish  drainage from left thigh. No fever or chills or other constitutional symptoms.  (06 Mar 2023 20:35)      PAST MEDICAL & SURGICAL HISTORY:  Diabetes Mellitus, Type II      CAD (Coronary Artery Disease)  s/p 3v CABG ; stents placed [place] in       Dyslipidemia      Osteomyelitis      COPD (chronic obstructive pulmonary disease)  on 2L at home and BiPAP at night; intubated       Hypertension      PVD (peripheral vascular disease)      History of PAT (paroxysmal atrial tachycardia)      Asthma with COPD      BPH (benign prostatic hyperplasia)      Acute osteomyelitis      CABG (Coronary Artery Bypass Graft)        Compound fracture  left leg      S/P primary angioplasty with coronary stent      H/O drainage of abscess  Left femur 2021          FAMILY HISTORY:  Family history of diabetes mellitus (Sibling)    Family hx of lung cancer  brother,  age 82, used to smoke with pt          Social History:    MEDICATIONS  (STANDING):  ascorbic acid 500 milliGRAM(s) Oral daily  atorvastatin 20 milliGRAM(s) Oral at bedtime  azithromycin   Tablet 250 milliGRAM(s) Oral <User Schedule>  budesonide 160 MICROgram(s)/formoterol 4.5 MICROgram(s) Inhaler 2 Puff(s) Inhalation two times a day  cefepime   IVPB 2000 milliGRAM(s) IV Intermittent every 12 hours  chlorhexidine 2% Cloths 1 Application(s) Topical <User Schedule>  chlorhexidine 4% Liquid 1 Application(s) Topical <User Schedule>  cholecalciferol 400 Unit(s) Oral daily  cyanocobalamin 1000 MICROGram(s) Oral daily  dextrose 5%. 1000 milliLiter(s) (100 mL/Hr) IV Continuous <Continuous>  dextrose 5%. 1000 milliLiter(s) (50 mL/Hr) IV Continuous <Continuous>  dextrose 50% Injectable 25 Gram(s) IV Push once  dextrose 50% Injectable 12.5 Gram(s) IV Push once  dextrose 50% Injectable 25 Gram(s) IV Push once  enoxaparin Injectable 40 milliGRAM(s) SubCutaneous every 24 hours  furosemide    Tablet 20 milliGRAM(s) Oral daily  glucagon  Injectable 1 milliGRAM(s) IntraMuscular once  hydrocortisone hemorrhoidal Suppository 1 Suppository(s) Rectal at bedtime  insulin glargine Injectable (LANTUS) 18 Unit(s) SubCutaneous at bedtime  insulin lispro (ADMELOG) corrective regimen sliding scale   SubCutaneous Before meals and at bedtime  magnesium oxide 400 milliGRAM(s) Oral three times a day with meals  mepolizumab Subcutaneous Injectable 100 milliGRAM(s) SubCutaneous every 4 weeks  metoprolol tartrate 50 milliGRAM(s) Oral two times a day  montelukast 10 milliGRAM(s) Oral daily  multivitamin 1 Tablet(s) Oral daily  pantoprazole    Tablet 40 milliGRAM(s) Oral every 12 hours  polyethylene glycol 3350 17 Gram(s) Oral daily  potassium chloride  20 mEq/100 mL IVPB 20 milliEquivalent(s) IV Intermittent every 1 hour  saccharomyces boulardii 250 milliGRAM(s) Oral two times a day  senna 2 Tablet(s) Oral at bedtime  simethicone 80 milliGRAM(s) Chew every 6 hours  sucralfate 1 Gram(s) Oral four times a day  tamsulosin 0.4 milliGRAM(s) Oral at bedtime  tiotropium 2.5 MICROgram(s) Inhaler 2 Puff(s) Inhalation daily    MEDICATIONS  (PRN):  albuterol/ipratropium for Nebulization 3 milliLiter(s) Nebulizer every 6 hours PRN Shortness of Breath and/or Wheezing  dextrose Oral Gel 15 Gram(s) Oral once PRN Blood Glucose LESS THAN 70 milliGRAM(s)/deciliter  sodium chloride 0.9% lock flush 10 milliLiter(s) IV Push every 1 hour PRN Pre/post blood products, medications, blood draw, and to maintain line patency        T(C): 36.7 (23 @ 08:20), Max: 36.7 (23 @ 04:40)  HR: 67 (23 @ 08:00) (64 - 94)  BP: 102/57 (23 @ 08:00) (102/57 - 133/63)  RR: 20 (23 @ 08:00) (14 - 26)  SpO2: 99% (- @ 08:00) (94% - 100%)  Wt(kg): --    PHYSICAL EXAM:  GENERAL: NAD, well-groomed, well-developed  HEAD:  Atraumatic, Normocephalic  NECK: Supple, No JVD, Normal thyroid  CHEST/LUNG: Clear to percussion bilaterally; No rales, rhonchi, wheezing, or rubs  HEART: Regular rate and rhythm; No murmurs, rubs, or gallops  ABDOMEN: Soft, Nontender, Nondistended; Bowel sounds present  EXTREMITIES:  2+ Peripheral Pulses, No clubbing, cyanosis, or edema  SKIN: No rashes or lesions    CAPILLARY BLOOD GLUCOSE      POCT Blood Glucose.: 149 mg/dL (22 Mar 2023 11:03)  POCT Blood Glucose.: 95 mg/dL (22 Mar 2023 07:46)  POCT Blood Glucose.: 169 mg/dL (21 Mar 2023 21:04)  POCT Blood Glucose.: 176 mg/dL (21 Mar 2023 17:24)  POCT Blood Glucose.: 183 mg/dL (21 Mar 2023 12:23)                            9.3    8.43  )-----------( 273      ( 22 Mar 2023 05:54 )             30.9       CMP:  - @ 05:54  SGPT --  Albumin --   Alk Phos --   Anion Gap 3   SGOT --   Total Bili --   BUN 23   Calcium Total 8.8   CO2 34   Chloride 105   Creatinine 1.10   eGFR if AA --   eGFR if non AA --   Glucose 109   Potassium 3.3   Protein --   Sodium 142      Thyroid Function Tests:      Diabetes Tests:       Radiology:

## 2023-03-22 NOTE — PROGRESS NOTE ADULT - TIME BILLING
Note written by attending. Meds, labs, vitals, chart reviewed. D/w RN at bedside
direct patient care including but not limited to reviewing chart, medications ,laboratory data, imaging reports, discussion of plan of care with consultants on the case, coordination of care with multidisciplinary team involved in the case and discussion of plan with patient.  Patient and family agreeable to plan of care and verbalized understanding the anticipated hospital course and treatment plan.

## 2023-03-22 NOTE — PROGRESS NOTE ADULT - SUBJECTIVE AND OBJECTIVE BOX
[facility]  INFECTIOUS DISEASES   [address] [place] [address]  Tel: [phone]     Fax: [phone]  ========================================================  MD Noman Perry Kaushal, MD Cho, Michelle, MD Sunjit, Jaspal, MD  ========================================================    [id number]  [facility]     Follow up: left thigh abscess and OM    No fever, some discharge from left thigh, it is much less than last week.   Had blood in stool and drop in H/H so had transfusion multiple times, and EGD on 3/13 showed gastritis and peptic ulcer.  Colonoscopy negative for active bleeding on 3/17.   Was transferred to ICU for monitoring after aspirating food during meal. Now doing well, not on o2.     PAST MEDICAL & SURGICAL HISTORY:  Diabetes Mellitus, Type II  CAD (Coronary Artery Disease)  s/p 3v CABG ; stents placed in [place] in   Dyslipidemia  Osteomyelitis  COPD (chronic obstructive pulmonary disease)  on 2L at home and BiPAP at night; intubated   Hypertension  PVD (peripheral vascular disease)  History of PAT (paroxysmal atrial tachycardia)  Asthma with COPD  BPH (benign prostatic hyperplasia)  Acute osteomyelitis  CABG (Coronary Artery Bypass Graft)    Compound fracture  left leg  S/P primary angioplasty with coronary stent  H/O drainage of abscess  Left femur 2021    Social Hx: No current smoking, ETOH or drugs     FAMILY HISTORY:  Family history of diabetes mellitus (Sibling)    Family hx of lung cancer  brother,  age 82, used to smoke with pt    Allergies  No Known Allergies    Intolerances  shellfish (Nausea)    Antibiotics:  Ciprofloxacin      REVIEW OF SYSTEMS:  CONSTITUTIONAL:  No Fever or chills  HEENT:  No diplopia or blurred vision.  No sore throat or runny nose.  CARDIOVASCULAR:  No chest pain or SOB.  RESPIRATORY:  No cough, shortness of breath, PND or orthopnea.  GASTROINTESTINAL:  No nausea, vomiting or diarrhea.  GENITOURINARY:  No dysuria, frequency or urgency. No Blood in urine  MUSCULOSKELETAL:  left thigh pain and drainage   SKIN:  No change in skin, hair or nails.  NEUROLOGIC:  No paresthesias or weakness.  PSYCHIATRIC:  No disorder of thought or mood.  ENDOCRINE:  No heat or cold intolerance, polyuria or polydipsia.  HEMATOLOGICAL:  No easy bruising or bleeding.     Physical Exam:  Vital Signs Last 24 Hrs  T(C): 36.7 (22 Mar 2023 11:58), Max: 36.7 (22 Mar 2023 04:40)  T(F): 98 (22 Mar 2023 11:58), Max: 98.1 (22 Mar 2023 04:40)  HR: 85 (22 Mar 2023 13:30) (64 - 159)  BP: 97/53 (22 Mar 2023 13:30) (85/55 - 133/63)  BP(mean): 69 (22 Mar 2023 13:30) (64 - 93)  RR: 17 (22 Mar 2023 13:30) (15 - 27)  SpO2: 98% (22 Mar 2023 13:30) (94% - 100%)  Parameters below as of 22 Mar 2023 08:00  Patient On (Oxygen Delivery Method): room air  GEN: NAD  HEENT: normocephalic and atraumatic. EOMI. PERRL.    NECK: Supple.  No lymphadenopathy   LUNGS: poor air movement   HEART: Regular rate and rhythm   ABDOMEN: Soft, nontender, and nondistended.  Positive bowel sounds.    : No CVA tenderness  EXTREMITIES: left thigh with scar in lateral side with fluctuation and small opening with yellow discharge   NEUROLOGIC: grossly intact.  PSYCHIATRIC: Appropriate affect .  SKIN: No rash     Labs:                        9.3    8.43  )-----------( 273      ( 22 Mar 2023 05:54 )             30.9         142  |  105  |  23  ----------------------------<  109<H>  3.3<L>   |  34<H>  |  1.10    Ca    8.8      22 Mar 2023 05:54  Phos  2.6       Mg     2.0         Culture - Abscess with Gram Stain (collected 03-10-23 @ 11:50)  Source: .Abscess left leg  Final Report (03-15-23 @ 19:04):    Few Corynebacterium jeikeium    "Susceptibilities not performed"    WBC Count: 8.43 K/uL (23 @ 05:54)  WBC Count: 11.88 K/uL (23 @ 05:33)  WBC Count: 14.78 K/uL (23 @ 05:56)  WBC Count: 15.73 K/uL (23 @ 18:50)  WBC Count: 7.42 K/uL (23 @ 06:05)  WBC Count: 8.15 K/uL (23 @ 07:05)    Creatinine, Serum: 1.10 mg/dL (23 @ 05:54)  Creatinine, Serum: 1.30 mg/dL (23 @ 05:33)  Creatinine, Serum: 1.40 mg/dL (23 @ 05:56)  Creatinine, Serum: 1.30 mg/dL (23 @ 18:50)  Creatinine, Serum: 1.10 mg/dL (23 @ 06:05)  Creatinine, Serum: 1.10 mg/dL (23 @ 07:05)    COVID-19 PCR: NotDetec (23 @ 17:00)  COVID-19 PCR: NotDetec (23 @ 14:48)  COVID-19 PCR: NotDetec (23 @ 18:44)  SARS-CoV-2 Result: NotDetec (23 @ 15:53)    All imaging and other data have been reviewed.  < from: MR Femur No Cont, Left (22 @ 13:40) >  IMPRESSION:  Chronic osteomyelitis with deformity of bone and with scattered areas of   T2 hyperintensity, less prominent than on the previous MRI of 2021,   however cannot exclude superimposed acute osteomyelitis/intraosseous   abscess.  Fluid tract extending from the chronic bony defect is contiguous with a   soft tissue abscess centered deep to the vastus intermedius measuring  12.9 cm craniocaudally, with surrounding surrounding muscle and soft   tissue edema.    Assessment and Plan:   83yo man with PMH of HTN, COPD on home O2, CAD s/p CABG, PVD s/p stents in b/l legs and left femoral fracture more than 50 years ago, was admitted at [facility] in 2021 with left  leg pain on the site of old fracture after CT showed possible intramedullary abscess. He had pain and infection in the old fracture area at least 3-4 times in the past, had multiple surgeries   and drainage of area with a long course of antibiotic treatment.  MRI showed collection, intraosseous abscess  S/P IR drain placement on 21  IR culture NGTD but had another culture with enterobacter.   Completed 6 weeks of ceftriaxone 2gm with PICC line in 2022  He was seen in the clinic and was started on suppressive ciprofloxacin for good oral bioavailability and also based on the culture.  MRI 2022 done showed 12.9cm collection with OM.   He stopped cipro sometimes last year and now feels more pain and swelling in area and started draining again.   Now MRI with 22cm collection.     Had drop in H/H and rectal bleeding for which had EGD 3/13 with Gastritis and peptic ulcer and also colonoscopy on 3/17 with no active bleeding. After multiple transfusions now stable H/H.  Over the weekend was transferred to ICU after aspirating food but now doing well. Not on any supplemental o2.   For the Left thigh USG was done showing 4.5cm collection (MRI that reported 22cmm, as per IR it was whole infected areas including tissue and bone), so didn't advise any intervention or drain   placement. At this time we just can treat with ABx.     Recommendations:  - Blood cultures negative   - Wound discharge sent for culture twice both times with skin shayy, CoNS and corynebacterium   - GI work up noted s/p EGD on 3/13 with gastritis and peptic ulcer, s/p colonoscopy 3/17 no active bleeding  - Continue Cefepime 2gm q12, will follow Creat, adjust accordingly  - Will place PICC to prepare him for discharge, will treat 6 weeks and after that restart Ciprofloxacin indefinitely   - Last day would be   - Weekly CBC, BMP and CRP, [facility] has been informed.     Will follow PRN.    Brandi العلي MD  Division of Infectious Diseases   Please call ID service at [phone] with any question.      35 minutes spent on total encounter assessing patient, examination, chart review, counseling and coordinating care by the attending physician/nurse/care manager.

## 2023-03-22 NOTE — PROGRESS NOTE ADULT - SUBJECTIVE AND OBJECTIVE BOX
[facility] - Génesis Villavicencio, Carroll Haney, Carolina Heath  Office Number: [phone]    Follow Up:  Respiratory failure, CAD    Subjective/Observations: Patient seen and examined at bedside. No overnight events. Patient is saturating well on RA, denies any shortness of breath, chest pain, palpitations.      TELE: NSR    PAST MEDICAL & SURGICAL HISTORY:  Diabetes Mellitus, Type II      CAD (Coronary Artery Disease)  s/p 3v CABG ; stents placed in [place] in       Dyslipidemia      Osteomyelitis      COPD (chronic obstructive pulmonary disease)  on 2L at home and BiPAP at night; intubated       Hypertension      PVD (peripheral vascular disease)      History of PAT (paroxysmal atrial tachycardia)      Asthma with COPD      BPH (benign prostatic hyperplasia)      Acute osteomyelitis      CABG (Coronary Artery Bypass Graft)        Compound fracture  left leg      S/P primary angioplasty with coronary stent      H/O drainage of abscess  Left femur 2021          SOCIAL HISTORY:  No tobacco, ethanol, or drug abuse.    FAMILY HISTORY:  Family history of diabetes mellitus (Sibling)    Family hx of lung cancer  brother,  age 82, used to smoke with pt      No family history of acute MI or sudden cardiac death.    MEDICATIONS  (STANDING):  ascorbic acid 500 milliGRAM(s) Oral daily  atorvastatin 20 milliGRAM(s) Oral at bedtime  azithromycin   Tablet 250 milliGRAM(s) Oral <User Schedule>  budesonide 160 MICROgram(s)/formoterol 4.5 MICROgram(s) Inhaler 2 Puff(s) Inhalation two times a day  cefepime   IVPB 2000 milliGRAM(s) IV Intermittent every 12 hours  chlorhexidine 2% Cloths 1 Application(s) Topical <User Schedule>  chlorhexidine 4% Liquid 1 Application(s) Topical <User Schedule>  cholecalciferol 400 Unit(s) Oral daily  cyanocobalamin 1000 MICROGram(s) Oral daily  dextrose 5%. 1000 milliLiter(s) (100 mL/Hr) IV Continuous <Continuous>  dextrose 5%. 1000 milliLiter(s) (50 mL/Hr) IV Continuous <Continuous>  dextrose 50% Injectable 25 Gram(s) IV Push once  dextrose 50% Injectable 12.5 Gram(s) IV Push once  dextrose 50% Injectable 25 Gram(s) IV Push once  enoxaparin Injectable 40 milliGRAM(s) SubCutaneous every 24 hours  furosemide    Tablet 20 milliGRAM(s) Oral daily  glucagon  Injectable 1 milliGRAM(s) IntraMuscular once  hydrocortisone hemorrhoidal Suppository 1 Suppository(s) Rectal at bedtime  insulin glargine Injectable (LANTUS) 18 Unit(s) SubCutaneous at bedtime  insulin lispro (ADMELOG) corrective regimen sliding scale   SubCutaneous Before meals and at bedtime  magnesium oxide 400 milliGRAM(s) Oral three times a day with meals  mepolizumab Subcutaneous Injectable 100 milliGRAM(s) SubCutaneous every 4 weeks  metoprolol tartrate 50 milliGRAM(s) Oral two times a day  metoprolol tartrate Injectable 5 milliGRAM(s) IV Push once  montelukast 10 milliGRAM(s) Oral daily  multivitamin 1 Tablet(s) Oral daily  pantoprazole    Tablet 40 milliGRAM(s) Oral every 12 hours  polyethylene glycol 3350 17 Gram(s) Oral daily  potassium chloride  20 mEq/100 mL IVPB 20 milliEquivalent(s) IV Intermittent every 1 hour  saccharomyces boulardii 250 milliGRAM(s) Oral two times a day  senna 2 Tablet(s) Oral at bedtime  simethicone 80 milliGRAM(s) Chew every 6 hours  sucralfate 1 Gram(s) Oral four times a day  tamsulosin 0.4 milliGRAM(s) Oral at bedtime  tiotropium 2.5 MICROgram(s) Inhaler 2 Puff(s) Inhalation daily    MEDICATIONS  (PRN):  albuterol/ipratropium for Nebulization 3 milliLiter(s) Nebulizer every 6 hours PRN Shortness of Breath and/or Wheezing  dextrose Oral Gel 15 Gram(s) Oral once PRN Blood Glucose LESS THAN 70 milliGRAM(s)/deciliter  sodium chloride 0.9% lock flush 10 milliLiter(s) IV Push every 1 hour PRN Pre/post blood products, medications, blood draw, and to maintain line patency      Allergies    No Known Allergies    Intolerances        VITAL SIGNS:   Vital Signs Last 24 Hrs  T(C): 36.7 (22 Mar 2023 08:20), Max: 36.7 (22 Mar 2023 04:40)  T(F): 98 (22 Mar 2023 08:20), Max: 98.1 (22 Mar 2023 04:40)  HR: 67 (22 Mar 2023 08:00) (64 - 94)  BP: 102/57 (22 Mar 2023 08:00) (102/57 - 133/63)  BP(mean): 72 (22 Mar 2023 08:00) (72 - 91)  RR: 20 (22 Mar 2023 08:00) (14 - 26)  SpO2: 99% (22 Mar 2023 08:00) (94% - 100%)    Parameters below as of 22 Mar 2023 08:00  Patient On (Oxygen Delivery Method): room air        I&O's Summary    21 Mar 2023 07:01  -  22 Mar 2023 07:00  --------------------------------------------------------  IN: 1210 mL / OUT: 1550 mL / NET: -340 mL        On Exam:  Constitutional: NAD, awake and alert, well-developed  HEENT: Moist Mucous Membranes, Anicteric  Pulmonary: Non-labored, breath sounds are clear bilaterally, No wheezing, rales or rhonchi  Cardiovascular: Regular, S1 and S2, No murmurs, rubs, gallops or clicks  Gastrointestinal: Bowel Sounds present, soft, nontender.   Lymph: No peripheral edema. No lymphadenopathy.  Skin: No visible rashes or ulcers.  Psych:  Mood & affect appropriate    LABS: All Labs Reviewed:                        9.3    8.43  )-----------( 273      ( 22 Mar 2023 05:54 )             30.9                         10.0   11.88 )-----------( 311      ( 21 Mar 2023 05:33 )             32.8                         10.4   14.78 )-----------( 294      ( 20 Mar 2023 05:56 )             34.5     22 Mar 2023 05:54    142    |  105    |  23     ----------------------------<  109    3.3     |  34     |  1.10   21 Mar 2023 05:33    142    |  104    |  31     ----------------------------<  137    3.8     |  36     |  1.30   20 Mar 2023 05:56    140    |  104    |  22     ----------------------------<  260    4.0     |  31     |  1.40     Ca    8.8        22 Mar 2023 05:54  Ca    9.2        21 Mar 2023 05:33  Ca    9.2        20 Mar 2023 05:56  Phos  2.6       22 Mar 2023 05:54  Phos  3.0       20 Mar 2023 05:56  Phos  3.4       19 Mar 2023 18:50  Mg     2.0       22 Mar 2023 05:54  Mg     1.6       20 Mar 2023 05:56  Mg     1.5       19 Mar 2023 18:50    TPro  6.8    /  Alb  3.1    /  TBili  0.3    /  DBili  x      /  AST  27     /  ALT  29     /  AlkPhos  101    19 Mar 2023 18:50    PT/INR - ( 22 Mar 2023 05:54 )   PT: 12.5 sec;   INR: 1.07 ratio         PTT - ( 22 Mar 2023 05:54 )  PTT:32.0 sec      Blood Culture:         RADIOLOGY:    ECG:

## 2023-03-22 NOTE — DISCHARGE NOTE NURSING/CASE MANAGEMENT/SOCIAL WORK - NSDCPNPNATDISSUGG_GEN_ALL_CORE
Spoke RN, Christiana, from [facility] regarding SI attempt and plan for discharge.    Dianne Byrne MD, PhD     No

## 2023-03-22 NOTE — CONSULT NOTE ADULT - CONSULT REQUESTED BY NAME
medicine
Dr. Dasilva
MD
I asked pt to come to ED if any discharge from leg
dm2 uncontrolled
Medicine
RRT
Treatment Number (Will Not Render If 0): 0
Detail Level: Detailed
Include Z78.9 (Other Specified Conditions Influencing Health Status) As An Associated Diagnosis?: No
Anesthesia Volume In Cc: 0.5
Post-Care Instructions: I reviewed with the patient in detail post-care instructions. Patient is to wear sunprotection, and avoid picking at any of the treated lesions. Pt may apply Vaseline to crusted or scabbing areas.
Medical Necessity Information: It is in your best interest to select a reason for this procedure from the list below. All of these items fulfill various CMS LCD requirements except the new and changing color options.
Consent: The patient's consent was obtained including but not limited to risks of crusting, scabbing, blistering, scarring, darker or lighter pigmentary change, recurrence, incomplete removal and infection.
Medical Necessity Clause: This procedure was medically necessary because the lesions that were treated were:

## 2023-03-22 NOTE — PROGRESS NOTE ADULT - SUBJECTIVE AND OBJECTIVE BOX
Patient is a 82y old  Male who presents with a chief complaint of Left  thigh draining sinus (21 Mar 2023 14:30)       INTERVAL HPI/OVERNIGHT EVENTS: Seen and examined at bedside. Denies any complaints. Feeling good     MEDICATIONS  (STANDING):  ascorbic acid 500 milliGRAM(s) Oral daily  atorvastatin 20 milliGRAM(s) Oral at bedtime  azithromycin   Tablet 250 milliGRAM(s) Oral <User Schedule>  budesonide 160 MICROgram(s)/formoterol 4.5 MICROgram(s) Inhaler 2 Puff(s) Inhalation two times a day  cefepime   IVPB 2000 milliGRAM(s) IV Intermittent every 12 hours  chlorhexidine 2% Cloths 1 Application(s) Topical <User Schedule>  cholecalciferol 400 Unit(s) Oral daily  cyanocobalamin 1000 MICROGram(s) Oral daily  dextrose 5%. 1000 milliLiter(s) (100 mL/Hr) IV Continuous <Continuous>  dextrose 5%. 1000 milliLiter(s) (50 mL/Hr) IV Continuous <Continuous>  dextrose 50% Injectable 25 Gram(s) IV Push once  dextrose 50% Injectable 12.5 Gram(s) IV Push once  dextrose 50% Injectable 25 Gram(s) IV Push once  enoxaparin Injectable 40 milliGRAM(s) SubCutaneous every 24 hours  furosemide    Tablet 20 milliGRAM(s) Oral daily  glucagon  Injectable 1 milliGRAM(s) IntraMuscular once  hydrocortisone hemorrhoidal Suppository 1 Suppository(s) Rectal at bedtime  insulin glargine Injectable (LANTUS) 18 Unit(s) SubCutaneous at bedtime  insulin lispro (ADMELOG) corrective regimen sliding scale   SubCutaneous Before meals and at bedtime  magnesium oxide 400 milliGRAM(s) Oral three times a day with meals  mepolizumab Subcutaneous Injectable 100 milliGRAM(s) SubCutaneous every 4 weeks  metoprolol tartrate 50 milliGRAM(s) Oral two times a day  montelukast 10 milliGRAM(s) Oral daily  multivitamin 1 Tablet(s) Oral daily  pantoprazole    Tablet 40 milliGRAM(s) Oral every 12 hours  polyethylene glycol 3350 17 Gram(s) Oral daily  potassium chloride    Tablet ER 40 milliEquivalent(s) Oral every 4 hours  saccharomyces boulardii 250 milliGRAM(s) Oral two times a day  senna 2 Tablet(s) Oral at bedtime  simethicone 80 milliGRAM(s) Chew every 6 hours  sucralfate 1 Gram(s) Oral four times a day  tamsulosin 0.4 milliGRAM(s) Oral at bedtime  tiotropium 2.5 MICROgram(s) Inhaler 2 Puff(s) Inhalation daily    MEDICATIONS  (PRN):  albuterol/ipratropium for Nebulization 3 milliLiter(s) Nebulizer every 6 hours PRN Shortness of Breath and/or Wheezing  dextrose Oral Gel 15 Gram(s) Oral once PRN Blood Glucose LESS THAN 70 milliGRAM(s)/deciliter      Allergies    No Known Allergies    Intolerances        REVIEW OF SYSTEMS:  CONSTITUTIONAL: No fever,  or fatigue  EYES: No eye pain, visual disturbances, or discharge  ENMT:  No difficulty hearing, tinnitus, vertigo; No sinus or throat pain  NECK: No pain or stiffness  RESPIRATORY: No cough, wheezing, chills or hemoptysis; No shortness of breath  CARDIOVASCULAR: No chest pain, palpitations, dizziness, or leg swelling  GASTROINTESTINAL: No abdominal or epigastric pain. No nausea, vomiting, or hematemesis; No diarrhea or constipation.   GENITOURINARY: No dysuria, frequency, hematuria, or incontinence  NEUROLOGICAL: No headaches, memory loss, loss of strength, numbness, or tremors  SKIN: No itching, burning, rashes, or lesions   LYMPH NODES: No enlarged glands  MUSCULOSKELETAL: No joint pain or swelling; No muscle, back, or extremity pain  HEME/LYMPH: No easy bruising, or bleeding gums  ALLERGY AND IMMUNOLOGIC: No hives or eczema    Vital Signs Last 24 Hrs  T(C): 36.7 (22 Mar 2023 08:20), Max: 36.7 (22 Mar 2023 04:40)  T(F): 98 (22 Mar 2023 08:20), Max: 98.1 (22 Mar 2023 04:40)  HR: 67 (22 Mar 2023 08:00) (64 - 94)  BP: 102/57 (22 Mar 2023 08:00) (93/50 - 133/63)  BP(mean): 72 (22 Mar 2023 08:00) (67 - 91)  RR: 20 (22 Mar 2023 08:00) (14 - 27)  SpO2: 99% (22 Mar 2023 08:00) (93% - 100%)    Parameters below as of 22 Mar 2023 08:00  Patient On (Oxygen Delivery Method): room air        PHYSICAL EXAM:  General: comfortable, no acute distress  HEENT: Atraumatic, no LAD, trachea midline, PERRLA  Cardiovascular: normal s1s2, no murmurs, gallops or fricition rubs  Pulmonary: clear to ausculation Bilaterally, no wheezing , rhonchi  Gastrointestinal: soft non tender non distended, no masses felt, no organomegally  Muscloskeletal: no lower extremity edema, intact bilateral lower extremity pulses  Neurological: CN II-12 intact. No focal weakness  Psychiatrical: normal mood, cooperative  SKIN: no rash, lesions or ulcers          LABS:                        9.3    8.43  )-----------( 273      ( 22 Mar 2023 05:54 )             30.9     22 Mar 2023 05:54    142    |  105    |  23     ----------------------------<  109    3.3     |  34     |  1.10     Ca    8.8        22 Mar 2023 05:54  Phos  2.6       22 Mar 2023 05:54  Mg     2.0       22 Mar 2023 05:54      PT/INR - ( 22 Mar 2023 05:54 )   PT: 12.5 sec;   INR: 1.07 ratio         PTT - ( 22 Mar 2023 05:54 )  PTT:32.0 sec  CAPILLARY BLOOD GLUCOSE      POCT Blood Glucose.: 95 mg/dL (22 Mar 2023 07:46)  POCT Blood Glucose.: 169 mg/dL (21 Mar 2023 21:04)  POCT Blood Glucose.: 176 mg/dL (21 Mar 2023 17:24)  POCT Blood Glucose.: 183 mg/dL (21 Mar 2023 12:23)    BLOOD CULTURE    RADIOLOGY & ADDITIONAL TESTS:    Imaging Personally Reviewed:  [ ] YES     Consultant(s) Notes Reviewed:      Care Discussed with Consultants/Other Providers:

## 2023-03-22 NOTE — CASE MANAGEMENT PROGRESS NOTE - NSCMPROGRESSNOTE_GEN_ALL_CORE
CM start a referral for IV infusion for Cefepime as per MD orders for SOC 3/23 at AM dose.  CM discussed transcition plan with patient and his wife.  They are both willing to learn care.  CM sent referral to [facility] [phone] and they accepted care.  All necessary documents fax to them.  CM confirm dose with ID MD.  RN and MD aware of discharge plan.

## 2023-03-22 NOTE — CONSULT NOTE ADULT - CONSULT REQUESTED DATE/TIME
09-Mar-2023 13:51
22-Mar-2023 11:38
06-Mar-2023 17:00
11-Mar-2023 12:38
07-Mar-2023 18:59
08-Mar-2023
19-Mar-2023 18:10

## 2023-03-22 NOTE — PROGRESS NOTE ADULT - ASSESSMENT
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, former smoker, chronic hypoxemic and hypercapnic respiratory failure on home oxygen and nocturnal BiPAP, history of cardiac arrest in 2018 due to respiratory failure, HTN, HLD, DM2 (not on insulin), CAD s/p 3V-CABG (2004) and PCI (Oct 2019), PVD s/p bilateral LE stents (most recently Nov 2019) on plavix, A-Fib on apixaban, BPH, and left femur fracture with chronic OM (s/p recent drainage in Dec 2021 of intramedullary abscess, previously on suppressive ciprofloxacin) who presented to [facility] with yellow drainage from L thigh wound, not a candidate for drainage per IR, cultures growing Corynebacterium jeikeium and coag negative staph. Started on cefepime. 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 Course further complicated by episode of respiratory distress and hypertension after choking on food on 3/19, now improved with BiPAP.    1. Neuro: AAOx3, moving all extremities. PT eval, out of bed to chair  2. CV: HD stable, hold apixaban and aspirin until 3/24. Continue atorvastatin and metoprolol. Furosemide 20 mg qd  3. Pulm: continue BiPAP 18/8 qhs. On room air during the day at rest. Supplemental oxygen with NC@2 LPM with ambulation. Remains off steroids. Continue mepolizumab 100 mg every month (next due 4/10). Continue Symbicort and Spiriva. Albuterol-ipratropium nebs prn. Montelukast 10 mg qhs. Azithromycin 250 mg MWF. Avoid hyperoxia  4. GI: consistent carb diet, soft and bite sized. S/p MBS 3/21. Pantoprazole 40 mg bid, continue sucralfate. Bowel regimen  5. Renal: CKD with stable creatinine, improved to 1.1 today. Close monitoring of kidney function and lytes, strict I/O's  6. ID: continue cefepime. S/p PICC today. Plan for home IV cefepime 2 gm IV q12h until 4/16, then will require suppressive therapy with ciprofloxacin. Should discontinue azithromycin therapy for COPD when he goes back on ciprofloxacin  7. Heme: continue enoxaparin 40 mg sq qd for DVT ppx. Restart apixaban and aspirin on 3/24 if no further bleeding  8. Endo: DM2, a1c 8.0. Decrease Lantus to 10-12 units qh, continue insulin coverage scale. Ok to restart Jardiance upon discharge. He has Novolog for insulin coverage scale (2 units for -200, 4 units for -250, 6 units for -300, 8 units for -350, 10 units for -400, call MD if FSG>400). Endocrine follow-up as outpatient  9. Skin: no lines or cunha  10. Dispo: full code, discussed with patient and wife Sania at bedside. Discussed with case management. [facility] services arranged for home antibiotics and PICC management. D/c home today after pm dose of cefepime

## 2023-03-22 NOTE — CONSULT NOTE ADULT - PROBLEM SELECTOR RECOMMENDATION 9
recommend decrease lantus 15 units qhs  cont low dose admelog corrective scale coverage qac/qhs  cons cho diet  goal bg 140-180 in icu setting

## 2023-03-22 NOTE — PROGRESS NOTE ADULT - ASSESSMENT
82 year old male with history of CAD s/p CABG, HLD, HTN, DM 2, COPD on home O2, PVD/LE stents, remote hx of L femoral fx with complicated course/multiple procedures/chronic OM sent by ID with draining sinus from left thigh.     1) Acute on chronic hypoxemic respiratory failure. Resolved   - likely volume overload from transfusion  - Continue Lasix   - Weaned off supplemental oxygen    2) Chronic L femur OM/L thigh abscess   - IR was discussed and no drainage was recommended  - Continue Cefepime  - Needs PICC this week for long term antibiotics  - ID follow up noted     3) Acute Blood Loss Anemia   - Likely due to GIB  - s/p 6 PRBCs  - s/p EGD on 3/13: showing gastric ulcer; gastritis; gastric ulcer; hiatal hernia  - s/p colonoscopy on 3/17: showing cecal polyp/avm s/p bx and clip   - Continue Protonix and Carafate   - Added Senna, Miralax and Anusol Suppositories for hemorrhoids   - GI follow up noted     4) History of Paroxysmal Atrial Tachycardia   - Continue Metoprolol  - Eliquis on hold, resume once cleared by GI    5) CAD / PVD  - Antiplatelets on hold, resume once cleared by GI  - Continue Lipitor and Metoprolol   - Cardio follow up noted     6) DM 2  - A1c 8  - Accu checks and ISS  - Increase Lantus to 18 units      7) Asthma / COPD / COURTNEY  - Continue Symbicort and Spiriva   - On Nucala every 4 weeks   - Nocturnal NIV  - Pulmonary follow up noted       DVT Prophylaxis -- on lovenox 40    Dispo: Possible d/c after PICC line placed today.

## 2023-03-22 NOTE — DISCHARGE NOTE NURSING/CASE MANAGEMENT/SOCIAL WORK - NSDCVIVACCINE_GEN_ALL_CORE_FT
influenza, injectable, quadrivalent, preservative free; 18-Oct-2019 13:33; Faiza Mclaughlin (RN); [employer]; [id number] (Exp. Date: 30-Jun-2020); IntraMuscular; Deltoid Left.; 0.5 milliLiter(s); VIS (VIS Published: 15-Aug-2019, VIS Presented: 18-Oct-2019);   influenza, injectable, quadrivalent, preservative free; 26-Sep-2020 15:14; Cory Crooks (RN); [employer]; [id number] (Exp. Date: 30-Jun-2021); IntraMuscular; Deltoid Left.; 0.5 milliLiter(s); VIS (VIS Published: 15-Aug-2019, VIS Presented: 26-Sep-2020);   influenza, injectable, quadrivalent, preservative free; 06-Sep-2021 18:22; Aaron Zavala (RACHEL); [employer]; [id number] (Exp. Date: 30-Jun-2022); IntraMuscular; Deltoid Left.; 0.5 milliLiter(s); VIS (VIS Published: 15-Aug-2019, VIS Presented: 06-Sep-2021);

## 2023-03-22 NOTE — PROCEDURE NOTE - PROCEDURE FINDINGS AND DETAILS
46cm bard single lumen power picc inserted into patent right brachial vein under sterile conditions and image guidance.  Tip is in the SVC.  PICC can be accessed.

## 2023-03-22 NOTE — CONSULT NOTE ADULT - CONSULT REASON
Left thigh abscess
GIB
left thigh abscess
Acute pulmonary edema
Cardiac clearance
LLE infection  COURTNEY  COPD  ASTHMA
dm2 uncontrolled

## 2023-03-22 NOTE — PROGRESS NOTE ADULT - REASON FOR ADMISSION
Left  thigh draining sinus

## 2023-03-22 NOTE — DISCHARGE NOTE NURSING/CASE MANAGEMENT/SOCIAL WORK - PATIENT PORTAL LINK FT
You can access the FollowMyHealth Patient Portal offered by [facility] by registering at the following website: [email or web address]. By joining [facility]’s FollowMyHealth portal, you will also be able to view your health information using other applications (apps) compatible with our system.

## 2023-03-22 NOTE — PROGRESS NOTE ADULT - SUBJECTIVE AND OBJECTIVE BOX
[facility] [facility]  Rich Sky PA-C  [address] [place] [address]  [phone]      INTERVAL HPI/OVERNIGHT EVENTS:  Pt seen with wife at bedside in ICU  Reports feeling well without GI complaints  Tolerating diet    MEDICATIONS  (STANDING):  ascorbic acid 500 milliGRAM(s) Oral daily  atorvastatin 20 milliGRAM(s) Oral at bedtime  azithromycin   Tablet 250 milliGRAM(s) Oral <User Schedule>  budesonide 160 MICROgram(s)/formoterol 4.5 MICROgram(s) Inhaler 2 Puff(s) Inhalation two times a day  cefepime   IVPB 2000 milliGRAM(s) IV Intermittent every 12 hours  chlorhexidine 2% Cloths 1 Application(s) Topical <User Schedule>  chlorhexidine 4% Liquid 1 Application(s) Topical <User Schedule>  cholecalciferol 400 Unit(s) Oral daily  cyanocobalamin 1000 MICROGram(s) Oral daily  dextrose 5%. 1000 milliLiter(s) (100 mL/Hr) IV Continuous <Continuous>  dextrose 5%. 1000 milliLiter(s) (50 mL/Hr) IV Continuous <Continuous>  dextrose 50% Injectable 25 Gram(s) IV Push once  dextrose 50% Injectable 12.5 Gram(s) IV Push once  dextrose 50% Injectable 25 Gram(s) IV Push once  enoxaparin Injectable 40 milliGRAM(s) SubCutaneous every 24 hours  furosemide    Tablet 20 milliGRAM(s) Oral daily  glucagon  Injectable 1 milliGRAM(s) IntraMuscular once  hydrocortisone hemorrhoidal Suppository 1 Suppository(s) Rectal at bedtime  insulin glargine Injectable (LANTUS) 18 Unit(s) SubCutaneous at bedtime  insulin lispro (ADMELOG) corrective regimen sliding scale   SubCutaneous Before meals and at bedtime  magnesium oxide 400 milliGRAM(s) Oral three times a day with meals  mepolizumab Subcutaneous Injectable 100 milliGRAM(s) SubCutaneous every 4 weeks  metoprolol tartrate 50 milliGRAM(s) Oral two times a day  montelukast 10 milliGRAM(s) Oral daily  multivitamin 1 Tablet(s) Oral daily  pantoprazole    Tablet 40 milliGRAM(s) Oral every 12 hours  polyethylene glycol 3350 17 Gram(s) Oral daily  potassium chloride  20 mEq/100 mL IVPB 20 milliEquivalent(s) IV Intermittent every 1 hour  saccharomyces boulardii 250 milliGRAM(s) Oral two times a day  senna 2 Tablet(s) Oral at bedtime  simethicone 80 milliGRAM(s) Chew every 6 hours  sucralfate 1 Gram(s) Oral four times a day  tamsulosin 0.4 milliGRAM(s) Oral at bedtime  tiotropium 2.5 MICROgram(s) Inhaler 2 Puff(s) Inhalation daily  zoster Vaccine recombinant (SHINGRIX) 0.5 milliLiter(s) IntraMuscular once    MEDICATIONS  (PRN):  albuterol/ipratropium for Nebulization 3 milliLiter(s) Nebulizer every 6 hours PRN Shortness of Breath and/or Wheezing  dextrose Oral Gel 15 Gram(s) Oral once PRN Blood Glucose LESS THAN 70 milliGRAM(s)/deciliter  sodium chloride 0.9% lock flush 10 milliLiter(s) IV Push every 1 hour PRN Pre/post blood products, medications, blood draw, and to maintain line patency      Allergies    No Known Allergies        PHYSICAL EXAM:   Vital Signs:  Vital Signs Last 24 Hrs  T(C): 36.7 (22 Mar 2023 11:58), Max: 36.7 (22 Mar 2023 04:40)  T(F): 98 (22 Mar 2023 11:58), Max: 98.1 (22 Mar 2023 04:40)  HR: 67 (22 Mar 2023 08:00) (64 - 94)  BP: 102/57 (22 Mar 2023 08:00) (102/57 - 133/63)  BP(mean): 72 (22 Mar 2023 08:00) (72 - 91)  RR: 20 (22 Mar 2023 08:00) (14 - 26)  SpO2: 99% (22 Mar 2023 08:00) (94% - 100%)    Parameters below as of 22 Mar 2023 08:00  Patient On (Oxygen Delivery Method): room air      Daily     Daily Weight in k (22 Mar 2023 05:00)    GENERAL:  Appears stated age  HEENT:  NC/AT  CHEST:  Full & symmetric excursion  HEART:  Regular rhythm  ABDOMEN:  Soft, non-tender, non-distended  EXTEREMITIES:  no cyanosis  SKIN:  No rash  NEURO:  Alert      LABS:                        9.3    8.43  )-----------( 273      ( 22 Mar 2023 05:54 )             30.9     03    142  |  105  |  23  ----------------------------<  109<H>  3.3<L>   |  34<H>  |  1.10    Ca    8.8      22 Mar 2023 05:54  Phos  2.6       Mg     2.0           PT/INR - ( 22 Mar 2023 05:54 )   PT: 12.5 sec;   INR: 1.07 ratio         PTT - ( 22 Mar 2023 05:54 )  PTT:32.0 sec

## 2023-03-22 NOTE — PROGRESS NOTE ADULT - ASSESSMENT
anemia  GIB    s/p egd 3/13 showing gastric ulcer; gastritis; gastric ulcer; hiatal hernia  s/p colonoscopy with bx and clip 3/17; cecal polyp; cecal avm   if rebleed capsule endoscopy as outpatient  Keep Hgb >8  ok to restart asa  hold eliquis for 1 week from colonoscopy  MBS noted; diet as per SLP recs  egd pathology noted; h pylori  cont abx and PPI; outpatient follow up for further management  no GI objection to d/c plan  d/w pt and wife at bedside  Will follow    I reviewed the overnight course of events on the unit, re-confirming the patient history. I discussed the care with the patient and their family  Differential diagnosis and plan of care discussed with patient after the evaluation  50 minutes spent on total encounter of which more than fifty percent of the encounter was spent counseling and/or coordinating care by the attending physician.  Advanced care planning was discussed with patient and family.  Advanced care planning forms were reviewed and discussed.  Risks, benefits and alternatives of gastroenterologic procedures were discussed in detail and all questions were answered.

## 2023-03-22 NOTE — PROGRESS NOTE ADULT - SUBJECTIVE AND OBJECTIVE BOX
Interval events: s/p PICC line this morning. Post-procedure with brief episode of A-Fib with RVR requiring metoprolol 5 mg IV with conversion to NSR. Feels asymptomatic currently doing well. No fevers, chills, chest pain, dyspnea, cough, nausea, vomiting, or diarrhea.    Review of Systems:  Constitutional: no fever, chills, fatigue  Neuro: no headache, numbness, weakness  Resp: no cough, wheezing, shortness of breath  CVS: no chest pain, palpitations, leg swelling  GI: no abdominal pain, nausea, vomiting, diarrhea   : no dysuria, frequency, incontinence  Skin: no itching, burning, rashes, or lesions   Msk: no joint pain or swelling  Psych: no depression, anxiety    T(F): 98 (03-22-23 @ 11:58), Max: 98.1 (03-22-23 @ 04:40)  HR: 83 (03-22-23 @ 15:00) (64 - 159)  BP: 104/56 (03-22-23 @ 15:00) (85/55 - 133/63)  RR: 22 (03-22-23 @ 15:00) (15 - 27)  SpO2: 99% (03-22-23 @ 15:00) (94% - 100%)    CAPILLARY BLOOD GLUCOSE    POCT Blood Glucose.: 149 mg/dL (22 Mar 2023 11:03)    I&O's Summary    21 Mar 2023 07:01  -  22 Mar 2023 07:00  --------------------------------------------------------  IN: 1210 mL / OUT: 1550 mL / NET: -340 mL    Physical Exam:     Gen: NAD; AAOx3  Neuro: CN II-XII grossly intact; moving all extremities   HEENT: NC/AT; EOMI; MMM  CV: normal S1 & S2; regular rate and rhythm   Pulm: decreased air entry bilaterally; no wheezing or rales  GI: soft; NT/ND  Ext: no edema; pulses intact  Skin: warm, well perfused    Meds:  enoxaparin Injectable 40 milliGRAM(s) SubCutaneous every 24 hours  azithromycin   Tablet 250 milliGRAM(s) Oral <User Schedule>  cefepime   IVPB 2000 milliGRAM(s) IV Intermittent every 12 hours  furosemide    Tablet 20 milliGRAM(s) Oral daily  metoprolol tartrate 50 milliGRAM(s) Oral two times a day  atorvastatin 20 milliGRAM(s) Oral at bedtime  insulin glargine Injectable (LANTUS) 18 Unit(s) SubCutaneous at bedtime  insulin lispro (ADMELOG) corrective regimen sliding scale   SubCutaneous Before meals and at bedtime  albuterol/ipratropium for Nebulization 3 milliLiter(s) Nebulizer every 6 hours PRN  budesonide 160 MICROgram(s)/formoterol 4.5 MICROgram(s) Inhaler 2 Puff(s) Inhalation two times a day  montelukast 10 milliGRAM(s) Oral daily  tiotropium 2.5 MICROgram(s) Inhaler 2 Puff(s) Inhalation daily  pantoprazole    Tablet 40 milliGRAM(s) Oral every 12 hours  polyethylene glycol 3350 17 Gram(s) Oral daily  senna 2 Tablet(s) Oral at bedtime  simethicone 80 milliGRAM(s) Chew every 6 hours  sucralfate 1 Gram(s) Oral four times a day  tamsulosin 0.4 milliGRAM(s) Oral at bedtime  ascorbic acid 500 milliGRAM(s) Oral daily  cholecalciferol 400 Unit(s) Oral daily  cyanocobalamin 1000 MICROGram(s) Oral daily  magnesium oxide 400 milliGRAM(s) Oral three times a day with meals  multivitamin 1 Tablet(s) Oral daily  mepolizumab Subcutaneous Injectable 100 milliGRAM(s) SubCutaneous every 4 weeks  zoster Vaccine recombinant (SHINGRIX) 0.5 milliLiter(s) IntraMuscular once  chlorhexidine 2% Cloths 1 Application(s) Topical <User Schedule>  chlorhexidine 4% Liquid 1 Application(s) Topical <User Schedule>  hydrocortisone hemorrhoidal Suppository 1 Suppository(s) Rectal at bedtime  saccharomyces boulardii 250 milliGRAM(s) Oral two times a day                           9.3    8.43  )-----------( 273      ( 22 Mar 2023 05:54 )             30.9       03-22    142  |  105  |  23  ----------------------------<  109<H>  3.3<L>   |  34<H>  |  1.10    Ca    8.8      22 Mar 2023 05:54  Phos  2.6     03-22  Mg     2.0     03-22    PT/INR - ( 22 Mar 2023 05:54 )   PT: 12.5 sec;   INR: 1.07 ratio    PTT - ( 22 Mar 2023 05:54 )  PTT:32.0 sec      CENTRAL LINE: R brachial PICC  DATE INSERTED: 3/22/23    REMOVE: N    SHARMA: N    A-LINE: N    GLOBAL ISSUE/BEST PRACTICE:  Analgesia: n/a  Sedation: n/a  CAM-ICU: negative  HOB elevation: yes  Stress ulcer prophylaxis: PPI  VTE prophylaxis: enoxaparin  Glycemic control: yes  Nutrition: yes    CODE STATUS: full

## 2023-03-22 NOTE — DISCHARGE NOTE NURSING/CASE MANAGEMENT/SOCIAL WORK - NSDCPEFALRISK_GEN_ALL_CORE
For information on Fall & Injury Prevention, visit: [email or web address] OR  [email or web address] OR  https://www.cdc.gov/steadi/patient.html

## 2023-03-22 NOTE — PROGRESS NOTE ADULT - ASSESSMENT
82 yr male with history of Hypertension, COPD home oxygen dependent, CAD CABG , chronic left femor osteomyelitis since traumatic left femoral fracture in 1966 presents with new yellowish  drainage from left thigh. Cardiology consulted for clearance for GI scopy. Course complicated by severe hypoxia for which RRT was called, patient transferred to ICU.    Hypoxia  - Pulm edema vs. choking incident  - Appears back to baseline s/p bipap and lasix  - Continue supplemental O2 as needed with nocturnal bipap - currently on RA    CAD s/p CABG  - EKG showed SR @ 93.  No evidence of any active ischemia.  No anginal complaints  - Continue BB and statin   - Please resume ASA when cleared by GI    - Previous TTE 12/21 showed overall normal systolic function with an estimated LVEF of 55-60%.   - Pt sees Dr. Rod for cardiology.   - No evidence of any meaningful volume overload laying flat.  Min to mild edema. Continue home Lasix 20 mg PO daily   - s/p EGD gastritis, gastric ulcer, hiatal hernia. s/p colon cecal polyp and avb, clipped.  Follow GI recs      - SCDs for DVT prophylaxis, holding Eliquis in setting of GI, restart pending GI recs  - Monitor and replete lytes, keep K>4, Mg>2.  - Will continue to follow

## 2023-03-22 NOTE — CONSULT NOTE ADULT - REASON FOR ADMISSION
Left  thigh draining sinus

## 2023-03-22 NOTE — CAREGIVER ENGAGEMENT NOTE - CAREGIVER OUTREACH NOTES - FREE TEXT
CM meet with Sania to discuss discharge plan and IV infusion.  She stated she has done this prior and willing to learn.  Plan to discharge today after PM IV infusion.  SOC tomorrow 3/23 in AM.  Wife in agreement.

## 2023-03-22 NOTE — PROGRESS NOTE ADULT - PROVIDER SPECIALTY LIST ADULT
Cardiology
Critical Care
Gastroenterology
Infectious Disease
Pulmonology
Surgery
Cardiology
Critical Care
Gastroenterology
Gastroenterology
Hospitalist
Hospitalist
Infectious Disease
Internal Medicine
Internal Medicine
Pulmonology
Pulmonology
Cardiology
Cardiology
Critical Care
Gastroenterology
Gastroenterology
Hospitalist
Infectious Disease
Infectious Disease
Internal Medicine
Internal Medicine
Pulmonology
Pulmonology
Cardiology
Critical Care
Gastroenterology
Hospitalist
Internal Medicine
Pulmonology
Cardiology
Internal Medicine
Cardiology
Gastroenterology
Hospitalist

## 2023-03-24 RX ORDER — CLOPIDOGREL BISULFATE 75 MG/1
1 TABLET, FILM COATED ORAL
Qty: 30 | Refills: 0
Start: 2023-03-24 | End: 2023-04-22

## 2023-03-24 RX ORDER — APIXABAN 2.5 MG/1
1 TABLET, FILM COATED ORAL
Qty: 60 | Refills: 0
Start: 2023-03-24 | End: 2023-04-22

## 2023-04-01 ENCOUNTER — RX RENEWAL (OUTPATIENT)
Age: 83
End: 2023-04-01

## 2023-04-06 NOTE — ED ADULT NURSE REASSESSMENT NOTE - NS ED NURSE REASSESS COMMENT FT1
Cup of decaf tea given to patient at his request.
ED MD Dawson at the bedside for evaluation.
Patient states he feels better, is oked for discharge per MD Dawson.  Patient has home oxygen with him.
Patient transported out to wife's car in parking lot in wheelchair.
Report received from RACHEL Shah in [facility].  Patient is pending radiology result.
verbal cues/nonverbal cues (demo/gestures)/1 person assist

## 2023-04-10 NOTE — ED PROVIDER NOTE - RESPIRATORY WHEEZES
Allergies and two pt identifiers verified.  UPT negative. Depo Provera administered IM per MD order and MAR.  Tolerated injection well.  Instructed patient to return between June 26- July 10. Pt scheduled appt 6/27/23 and voiced understanding.       DIFFUSE

## 2023-04-13 NOTE — PATIENT PROFILE ADULT - VISION (WITH CORRECTIVE LENSES IF THE PATIENT USUALLY WEARS THEM):
Normal vision: sees adequately in most situations; can see medication labels, newsprint (4) rarely moist

## 2023-04-17 NOTE — DISCHARGE NOTE PROVIDER - CARE PROVIDER_API CALL
REVIEWED PATIENTS CBC RESULTS WITH WILLIAN SNATIAGO. PER WILLIAN SANTIAGO NO BLOOD TRANSFUSION NEEDED AT THIS TIME BASED ON PATIENTS LAB RESULTS.     PATIENT NOTIFIED THAT HE DOES NOT NEED A BLOOD TRANSFUSION TODAY. HE VOICED UNDERSTANDING.    Arun Dejesus)  Critical Care Medicine; Internal Medicine; Pulmonary Disease  [address] [place] [address]  Phone: [phone]  Fax: [phone]  Follow Up Time: 1 week    Gemini Lopez)  Internal Medicine  [address], [address]  [place] [address]  Phone: [phone]  Fax: [phone]  Follow Up Time: 2 weeks    Perlman, Craig D ()  Medicine  [address] [place] [address], [address]  [place] [address]  Phone: [phone]  Fax: [phone]  Follow Up Time: 1 month

## 2023-04-19 ENCOUNTER — RESULT REVIEW (OUTPATIENT)
Age: 83
End: 2023-04-19

## 2023-04-19 ENCOUNTER — APPOINTMENT (OUTPATIENT)
Dept: ENDOVASCULAR SURGERY | Facility: CLINIC | Age: 83
End: 2023-04-19

## 2023-04-19 VITALS
TEMPERATURE: 97.2 F | DIASTOLIC BLOOD PRESSURE: 72 MMHG | OXYGEN SATURATION: 99 % | RESPIRATION RATE: 20 BRPM | HEART RATE: 81 BPM | WEIGHT: 219 LBS | SYSTOLIC BLOOD PRESSURE: 109 MMHG | BODY MASS INDEX: 31.35 KG/M2 | HEIGHT: 70 IN

## 2023-04-19 PROBLEM — M86.10 OTHER ACUTE OSTEOMYELITIS, UNSPECIFIED SITE: Chronic | Status: ACTIVE | Noted: 2023-03-06

## 2023-04-19 NOTE — PROCEDURE
nonproductive/infrequent [Groin check for bleeding/hematoma, vitals checked, and Doppler/pulse checked on admission, then every 15 minutes for an hour and every 30 minutes for 3 hours thereafter.] : Groin check for bleeding/hematoma, vitals checked, and Doppler/pulse checked on admission, then every 15 minutes for an hour and every 30 minutes for 3 hours thereafter.  [Keep flat for 2 hours, then elevate head gradually as tolerated] : Keep flat for 2 hours, then elevate head gradually as tolerated [Resume Diet] : resume diet [Discharge at _____] : Discharge at [unfilled]. [D/C IV on discharge] : D/C IV on discharge [Resume diet] : resume diet [FreeTextEntry1] : aortogram, left leg angiogram, angioplasty and stent

## 2023-04-19 NOTE — PAST MEDICAL HISTORY
[Increasing age ( >40 years old)] : Increasing age ( >40 years old) [Altered mobility] : Altered mobility [No therapy indicated for cases scheduled for less than one hour] : No therapy indicated for cases scheduled for less than one hour. [FreeTextEntry1] : Malignant Hyperthermia Screening Tool and Risk of Bleeding Assessment\par \par Mr. YUE COTTO denies family history of unexpected death following Anesthesia or Exercise.\par Denies Family history of Malignant Hyperthermia, Muscle or Neuromuscular disorder and High Temperature following exercise.\par \par Mr. YUE COTTO denies history of Muscle Spasm, Dark or Chocolate - Colored urine and Unanticipated fever immediately following anesthesia or serious exercise. \par Mr. COTTO also denies bleeding tendencies/ Risks of Bleeding.\par

## 2023-04-19 NOTE — PHYSICAL EXAM
[Normal Breath Sounds] : Normal breath sounds [Normal Heart Sounds] : normal heart sounds [2+] : left 2+ [Oriented to Place] : oriented to place [JVD] : no jugular venous distention  [Ankle Swelling (On Exam)] : not present [Varicose Veins Of Lower Extremities] : not present [] : not present [Abdomen Masses] : No abdominal masses [Skin Ulcer] : no ulcer

## 2023-04-19 NOTE — HISTORY OF PRESENT ILLNESS
[FreeTextEntry1] : Cr: 1/10 3/22/2023\par accompanied by wife Himanshu [phone]\par recent hospitalization for respiratory distress\par took blood pressure medications this morning \par  reported blood sugar \par last Eliquis yesterday morning [FreeTextEntry5] : yesterday at 700pm [FreeTextEntry6] : Dr. Miranda

## 2023-04-19 NOTE — ASSESSMENT
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  \par \par Patient with new findings of monophasic flow through the left iliac system with possible severe inflow stenosis at the level of the left common iliac artery.  Plan for left leg angiogram to the left groin approach.\par \par \par

## 2023-04-26 NOTE — PATIENT PROFILE ADULT - NSASFALLNEEDSASSISTWITH_GEN_A_NUR
toileting Localized Dermabrasion Text: The patient was draped in routine manner.  Localized dermabrasion using 3 x 17 mm wire brush was performed in routine manner to papillary dermis. This spot dermabrasion is being performed to complete skin cancer reconstruction. It also will eliminate the other sun damaged precancerous cells that are known to be part of the regional effect of a lifetime's worth of sun exposure. This localized dermabrasion is therapeutic and should not be considered cosmetic in any regard. Localized Dermabrasion With Wire Brush Text: The patient was draped in routine manner.  Localized dermabrasion using 3 x 17 mm wire brush was performed in routine manner to papillary dermis. This spot dermabrasion is being performed to complete skin cancer reconstruction. It also will eliminate the other sun damaged precancerous cells that are known to be part of the regional effect of a lifetime's worth of sun exposure. This localized dermabrasion is therapeutic and should not be considered cosmetic in any regard.

## 2023-05-15 ENCOUNTER — APPOINTMENT (OUTPATIENT)
Dept: VASCULAR SURGERY | Facility: CLINIC | Age: 83
End: 2023-05-15
Payer: MEDICARE

## 2023-05-15 PROCEDURE — 99214 OFFICE O/P EST MOD 30 MIN: CPT

## 2023-05-15 PROCEDURE — 93926 LOWER EXTREMITY STUDY: CPT

## 2023-05-15 NOTE — ASSESSMENT
[FreeTextEntry1] : 79 yo male with history of dm, htn, copd, pad s/p b/l iliac stents and ester lower extremity sfa stent \par Continue Eliquis and Asa\par \par Patient is symptoms remain stable.  Continue conservative management.

## 2023-05-18 ENCOUNTER — APPOINTMENT (OUTPATIENT)
Dept: INFECTIOUS DISEASE | Facility: CLINIC | Age: 83
End: 2023-05-18
Payer: MEDICARE

## 2023-05-18 VITALS
OXYGEN SATURATION: 93 % | HEART RATE: 95 BPM | TEMPERATURE: 97.7 F | DIASTOLIC BLOOD PRESSURE: 69 MMHG | SYSTOLIC BLOOD PRESSURE: 108 MMHG | HEIGHT: 70 IN

## 2023-05-18 DIAGNOSIS — M86.9 OSTEOMYELITIS, UNSPECIFIED: ICD-10-CM

## 2023-05-18 DIAGNOSIS — L02.416 CUTANEOUS ABSCESS OF LEFT LOWER LIMB: ICD-10-CM

## 2023-05-18 PROCEDURE — 99214 OFFICE O/P EST MOD 30 MIN: CPT

## 2023-05-18 NOTE — REVIEW OF SYSTEMS
[Negative] : Heme/Lymph [FreeTextEntry2] : recent weight loss otherwise negative  [FreeTextEntry9] : left leg pain

## 2023-05-18 NOTE — HISTORY OF PRESENT ILLNESS
[FreeTextEntry1] : 84yo man with PMH of HTN, COPD on home O2, CAD s/p CABG, PVD s/p stents in b/l\par legs and left femoral fracture more than 50 years ago, was admitted at [facility] in 12/2021 with left\par leg pain on the site of old fracture after CT showed possible intramedullary\par abscess. He had pain and infection in the old fracture area at least 3-4\par times in the past, had multiple surgeries and drainage of area with a long course of\par antibiotic treatment.\par MRI showed collection, intraosseous abscess\par S/P IR drain placement on 12/27\par Completed 6 weeks of ceftriaxone 2gm with PICC line in 1/2022\par He was seen in the clinic and was started on suppressive ciprofloxacin for good oral bioavailability and also based on the culture with a sensitive Enterobacter.\par MRI recently done showed 12.9cm collection with OM. \par He stopped cipro sometimes last year and discharge came back \par He was hospitalized in march2023 again with pain and discharge and repeat Imaging showed a large phlegmon and some collection, no intervention was done this time. \par Wound discharge sent for culture twice both times with skin shayy, CoNS and\par corynebacterium, he was treated for 6 weeks with cefepime until 4/16. \par Also during hospital stay had GI bleed s/p EGD on 3/13 with gastritis and peptic ulcer, s/p\par colonoscopy 3/17 no active bleeding bu H pylori was positive, for which treated with pylera. \par No fever or chills, no open wound or drainage in the area. \par \par

## 2023-05-18 NOTE — ASSESSMENT
[FreeTextEntry1] : At this point I would continue ciprofloxacin, no diarrhea, he is complaining of constipation so advised to take prune juice and mylanta is fine too. Will take probiotics. Tyelnol once in a while is fine too. \par If more weight loss will need follow up with PCP for more work up, since he was hospitalized and had H pylori on different meds could be the cause of some wieght loss (8lbs) \par Will follow up with pulmonary. Azithromycin is on hold due to cipro use. \par No cardiac issue, ECG normal, needs to check regularly with PCP/cardiology. \par \par Will see him PRN or 3 months whicever comes first. \par \par Patient was given the opportunity to ask questions and all questions were answered to their satisfaction.\par Counseling included lab results, differential diagnosis, treatment options, risks and benefits, lifestyle changes, current condition, medications and dose adjustments.\par The patient verbalized understanding.\par  [Treatment Education] : treatment education [Treatment Adherence] : treatment adherence [Risk Reduction] : risk reduction [Universal Precautions] : universal precautions [Drug Interactions / Side Effects] : drug interactions/side effects [Anticipatory Guidance] : anticipatory guidance

## 2023-06-01 NOTE — H&P ADULT - PROBLEM/PLAN-4
DISPLAY PLAN FREE TEXT Xelcarleez Pregnancy And Lactation Text: This medication is Pregnancy Category D and is not considered safe during pregnancy.  The risk during breast feeding is also uncertain.

## 2023-06-05 NOTE — ED ADULT TRIAGE NOTE - CHIEF COMPLAINT QUOTE
pt brought in by ambulance for intermittent chest pain this morning  when EMS arrived, pt's heart rate was 160-170 but resolved, pt on 2L NC at home for COPD, c/o difficulty breathing, when EMS increased O2 to 4L, pt states breathing was easier No

## 2023-06-06 NOTE — PROGRESS NOTE ADULT - TIME BILLING
English
care coordination, plan of care discussed with patient face to face, pulm Dr Dejesus
care coordination, plan of care discussed with patient face to face,  night RACHEL Malin
The total amount of time listed was spent reviewing the hospital notes, laboratory values, imaging findings, assessing/counseling the patient, discussing with consultant physicians, case management, social work, and nursing staff.
care coordination, plan of care discussed with patietn face to face, ortho team

## 2023-06-10 NOTE — PROGRESS NOTE ADULT - ASSESSMENT
Medication name: meloxicam (MOBIC) 7.5 MG tablet  Last Refill Details: Date 03/05/2023, Quantity:  20, refills 1   Ordering provider name: Dr. Low  Last office visit: 05/08/2023 with provider Twyla Jones, CNP Return request for 3 month follow up  Refill Request Approved - caller notified and encouraged to follow-up with provider.            COMMODORE YUE 81 m 2021  [place] [phone]  1940  [place]     REVIEW OF SYMPTOMS      Able to give ROS  Yes     RELIABLE +/-   CONSTITUTIONAL Weakness Yes  Chills No   ENDOCRINE  No heat or cold intolerance    ALLERGY No hives  Sore throat No stridor  RESP Coughing blood no  Shortness of breath YES   NEURO No Headache  Confusion Pain neck No   CARDIAC No Chest pain No Palpitations   GI  Pain abdomen NO   Vomiting NO     PHYSICAL EXAM    HEENT Unremarkable  atraumatic   RESP Fair air entry EXP prolonged    Harsh breath sound Resp distres mild   CARDIAC S1 S2 No S3     NO JVD    ABDOMEN SOFT BS PRESENT NOT DISTENDED No hepatosplenomegaly PEDAL EDEMA present No calf tenderness  NO rash       2021 PATIENT PRESENTATION.  79 m former smoker PMH HTN, DM2 (on home Metformin and glipizide), COPD (2L home/ BIPAP at night), CAD with 3v CABG , Stent 2019 HLD, ECHO 2020 ECHO preserved lvsf dd1 ivc dilated but appears 50% collapse with respn     hx hypercapnic resp failure with ho intubation c/b with cardiac arrest (2018) with ho recurrent admissions GOLD GRADE D admitted 9/3 pm with AOC hypercapnic resp failure COPD ex      2021 PRESENTING PROBLEMS.        COPD ex  RESP FAILURE      PMH.    FORMER SMOKER   COPD GRADE D 2L NOCT BPAP   2019 FVC 2.20 58% Post 2.61 69% +18 FEV1   0.86 31% Post 0.89 32% +4%  FEV1% 39%     INTUBATION CAC 2018    CAD 3V CABG    CAD STENT 2019    HFPEF 2020 DD1    DM2        BEST PRACTICE ISSUES.                                                  HEAD OF BED ELEVATION. Yes  DVT PROPHYLAXIS.  apixaban 5.3 (9/3) (Continued as home med reason for use of apixaban not clear form chart)                                       MCCORMICK PROPHYLAXIS.                                                                                         DIET.     cons carb (9/3)                      INFECTION PROPHYLAXIS.                        GENERAL ISSUES  GOC ADVANCED DIRECTIVE.                            ALLERGY.   nka                         WEIGHT.         2021 80                            BMI.                   2021 24    PATIENT DATA   TUBES  PROCEDURES.          ABG.  2021 7p bpap 18/8/100%730/64/523       OXYGENATION.              2021 2l 95%  2021 ra 91%     VITALS/IO/VENT/DRIPS.  2021 afeb 69 120/50        PROBLEM ASSESSMENT/RECOMMENDATIONS.    COVID STATUS  scv2 9/3 (-)   INFECTION  w -2021 w 7.8 - 11.9    rvp 9/3 (-)   azithro (9/3)   COPD ex   LACTICEMIA  la 9/3- la 3.1-2  COPD ex  duoneb p (9/3)   symbicort (9/3)   solumed 40.2 (9/3)  spiriva (9/3)   Cont rx    At dc pred 30 taper over 5 d   RESP FAILURE  Was on bpap last night now on nc   looks improvd  CAD  2021 nucear stesstest ef 54% Hypokinesis septal distal ant and apical walls Mod periinfarct ischemia anteroseptal and apicl    HO STENT 2021   ASA 81 (8/3)  atorvastat 8 (9/3)   plavix 75 (9/3)   metoprolol 50.2 (9/3)   No active ischemia   Cont rx  CHF  echo 8/3/2021 segmental hypokinesia cp0vliidbj in lvsf cw 2018 ef 45%  losartan 25 (9/3)   ANEMIA  Hb 2021 Hb 11.8   mcv 2021 mv 90   CKD  Cr -2021 Cr 1.5 - 1.4         OVERALL PLAN.  COPD ex BD steroids abio  RESP FAILURE on noct bpap   CHF  CAD    TIME SPENT   Over 25 minutes aggregate care time spent on encounter; activities included   direct patient care, counseling and/or coordinating care reviewing notes, lab data/ imaging , discussion with multidisciplinary team/ patient  /family and explaining in detail risks, benefits, alternatives  of the recommendations     COMMODORE TURNER 81 m 2021  [place] [phone]  1940  [place]

## 2023-06-27 NOTE — DIETITIAN INITIAL EVALUATION ADULT. - PROBLEM SELECTOR PROBLEM 4
CAD (Coronary Artery Disease) Purse String (Simple) Text: Given the location of the defect and the characteristics of the surrounding skin a purse string simple closure was deemed most appropriate.  Undermining was performed circumferentially around the surgical defect.  A purse string suture was then placed and tightened.

## 2023-07-23 PROBLEM — J45.50 ASTHMA, SEVERE PERSISTENT: Status: ACTIVE | Noted: 2019-12-19

## 2023-07-23 PROBLEM — J30.9 ALLERGIC RHINITIS: Status: ACTIVE | Noted: 2019-12-16

## 2023-07-23 NOTE — PHYSICAL EXAM
[No Acute Distress] : no acute distress [Well Nourished] : well nourished [Well Developed] : well developed [No Resp Distress] : no resp distress [Oriented x3] : oriented x3 [Normal Affect] : normal affect [Normal Oropharynx] : normal oropharynx [Normal Appearance] : normal appearance [Supple] : supple [No Neck Mass] : no neck mass [Normal Rate/Rhythm] : normal rate/rhythm [No JVD] : no jvd [Normal Pulses] : normal pulses [Normal S1, S2] : normal s1, s2 [No Murmurs] : no murmurs [No Acc Muscle Use] : no acc muscle use [Benign] : benign [Not Tender] : not tender [Soft] : soft [Normal Gait] : normal gait [No Clubbing] : no clubbing [No Cyanosis] : no cyanosis [No Edema] : no edema [FROM] : FROM [No Focal Deficits] : no focal deficits [Cranial Nerves Intact] : cranial nerves intact [No Motor Deficits] : no motor deficits [TextBox_68] : decreased air entry bilaterally; no wheezing [TextBox_54] : trace pedal edema

## 2023-07-23 NOTE — ASSESSMENT
[FreeTextEntry1] : taper off montelukast\par off azithro\par Nucala every month\par off prednisone\par swimming pool today\par continue breo and incruse\par oxygen day\par bipap night\par \par Mr. 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 He denies any dyspnea at rest and reports stable dyspnea on exertion. Wife notes that he has labored breathing when he does not use supplemental oxygen. He walks with a cane. No wheezing or coughing. He feels well overall. 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 He completed 6 weeks of antibiotics, now on suppressive ciprofloxacin. Last pulmonary hospitalizations were August 2022, May 2022, Sept 2021, and May 2021. Last ED visit was in Nov 2022. Last steroids was in Dec 2022. 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 If symptoms worsen, can restart 10 mg daily at bedtime\par - Hold off on azithromycin therapy while on ciprofloxacin to avoid QTc prolongation\par - Albuterol inhaler prn\par - Hold off on pulmonary rehab at this time\par - He plans to start exercising in the swimming pool at his house\par - Increase exercise as tolerated, including walking in the house and outdoors, using stationary bike and treadmill\par - PFTs (May 2022) with severe obstruction, increased TLC and RV consistent with air trapping and dynamic hyperinflation. There is moderately reduced diffusing capacity. No significant change compared to Dec 2020\par - 6-minute walk distance in May 2022 173 meters using 2L NC, improved from 148 meters in Dec 2020\par - EOT May 2022 with no hypoxemia at rest (97%), but with exercise hypoxemia to 85% while walking at 1.4 MPH requiring 2 liters NC\par - Hold off on BLVR given improved symptoms and no recent exacerbations\par - Recent ABG in March 2023 with pH 7.38, pCO2 47, pO2 68, SaO2 94%\par - Resolved eosinophilia with mepolizumab. IgE previously elevated to 118 in Dec 2019 with allergy testing positive for house dust. Aspergillus IgE negative. Alpha-1 antitrypsin normal and HIV negative\par \par 2. Chronic hypoxemia and hypercapnic respiratory failure:\par - Likely due to asthma/COPD\par - Continue supplemental oxygen with nasal cannula 2-3 LPM with exercise, goal SpO2>88%\par - BiPAP every night, IPAP 18, EPAP 8\par \par 3. Abnormal CT Chest\par - CT Chest in June 2020 with interval predominant resolution of the previously seen LLL consolidation with minimal residual atelectasis. New RLL area of linear atelectasis\par - Hold off on further imaging at this time\par \par 4. Bilateral leg edema:\par - Improved with low salt diet\par - 2D-echo in August 2021 with mild segmental LV systolic dysfunction with hypokinesis of the apical inferior, apical septum, apical lateral, basal inferior, mid anterior, and mid inferior walls. Also with mild diastolic dysfunction\par - Continue cardiac meds, including apixaban, clopidogrel, rosuvastatin, metoprolol\par - Continue low salt diet, keep legs elevated and compression stockings\par - No evidence of pulmonary hypertension on prior 2D-echo\par \par 5. Upper airway cough syndrome:\par - No significant cough currently, not requiring to take fluticasone or azelastine nasal sprays\par \par 6. HCM:\par - Up to date with influenza, pneumococcal, and COVID-19 vaccinations\par \par 7. Follow-up in 3-6 months or sooner prn

## 2023-07-23 NOTE — HISTORY OF PRESENT ILLNESS
[FreeTextEntry1] : Mr. 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 He denies any dyspnea at rest and reports stable dyspnea on exertion. Wife notes that he has labored breathing when he does not use supplemental oxygen. He walks with a cane. No wheezing or coughing. He feels well overall.\par \par His respiratory status currently remains stable. He was last hospitalized at [facility] in March 2023 for increased purulent drainage from the left thigh OM. He was treated with antibiotics. 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 Remains on sucralfate and pantoprazole. He also had an episode of respiratory distress due to choking on 3/19 requiring ICU admission with rapid improvement. He was treated with IV cefepime and discharged home with PICC line, completed 6 weeks of therapy, now on suppressive ciprofloxacin. Off azithromycin while on Cipro.\par \par Last pulmonary hospitalizations were August 2022, May 2022, Sept 2021, and May 2021. Last ED visit was in Nov 2022. Last steroids was in Dec 2022. \par \par He currently feels well and is back to normal currently. He remains on mepolizumab injections monthly, last dose was last week. He is now off prednisone. He is adherent with Breo Ellipta 200-25 mcg and Incruse Ellipta. He also takes montelukast. He is adherent with BiPAP (IPAP 18, EPAP 8) every night from 11pm until 9am. He also wears oxygen during the day with activity, such as walking up the steps. Oxygen saturation remains at 93-95% on room air at rest, with desaturation to about 88% with activity. With 2L NC, oxygen saturation is in the upper 90s. He uses the oxygen intermittently as he prefers not to use. Oxygen saturation currently after walking to the bathroom is 92-93%. Previous allergy testing positive for dust. Has Ramírez at home for rugs in house. Also has 2 air purifiers. He has not been needing to use fluticasone or azelastine nasal sprays.\par \par He remains on Jardiance for his diabetes. He also takes apixaban 2.5 mg bid, clopidogrel, metoprolol, and rosuvastatin. He has lost 7 lbs in the last year.\par \par He has not went for pulmonary rehab. Declining physical therapy at home or pulmonary rehab at this time. Has weights, stationary bike, treadmill, and pool at home, and reports that he will start using. He goes to sleep at around 11pm-12am, wakes up at 9am, then naps from 1pm to 2-3 pm. \par \par ABG (March 2023) with pH 7.38, pCO2 47, pO2 68, HCO3 28, SaO2 94%.\par \par Last 2D-echo in Dec 2021 with mild diastolic dysfunction, normal LV systolic function, no significant valvular disease, and no pulmonary hypertension (estimated RVSP is 28). \par \par PFTs (May 2022) with stable severe obstruction. Increased TLC and RV consistent with dynamic hyperinflation and air trapping. Diffusion capacity is moderately reduced.\par \par 6MWD (May 2022) improved to 173 meters from 148 meters in Dec 2022.\par \par Exercise oximetry (May 2022) with normal oxygen saturation at rest (97%), but developed hypoxemia to 85% while walking at 1.4 MPH requiring 2L NC with recovery to 90%. \par \par Last CT chest in June 2020 with interval improvement and near resolution of LLL pneumonia with minimal residual atelectasis. There is new linear atelectasis in the RLL. Stable emphysema.\par \par Regarding his smoking history, he quit smoking in the 1980s, used to smoke 1 ppd for 30 years. Was smoking marijuana about 3-4 joints 3x/week until 2017. No alcohol use. No other drug use

## 2023-08-03 NOTE — ED ADULT NURSE NOTE - NEURO WDL
Creatine stable for now. BMP reviewed- noted Estimated Creatinine Clearance: 31.1 mL/min (based on SCr of 1.2 mg/dL). according to latest data. Based on current GFR, CKD stage is stage 3 - GFR 30-59.  Monitor UOP and serial BMP and adjust therapy as needed. Renally dose meds. Avoid nephrotoxic medications and procedures.   Cr 1.3 on admission. Baseline ~1.0. Improving with Continuous IVF hydration. Encourage PO hydration.    Alert and oriented to person, place and time, memory intact, behavior appropriate to situation, PERRL.

## 2023-08-21 NOTE — ED PROVIDER NOTE - NSFOLLOWUPINSTRUCTIONS_ED_ALL_ED_FT
Call today or tomorrow for a follow up with your dentist tomorrow or a dental clinic from the clinic list provided. Take your medication as indicated, if you are given an antibiotic then make sure you get the prescription filled and take the antibiotics until finished. Drink plenty of water while taking the antibiotics. Avoid drinking alcohol while taking antibiotics. Yaakov Nance has some antibiotics for free; Eneida Zhang has a 4 dollar prescription plan for some antibiotics. Use ibuprofen or Tylenol (unless prescribed medications that have Tylenol in it) for pain. You can take over the counter Ibuprofen (advil) tablets (4 every 8 hours or 3 every 6 hours or 2 every 4 hours). You can also use over the counter orajel as directed. Return to the Emergency Department for fever > 101.5, swelling to face, redness on face, drainage from tooth, any other care or concern. Dental Emergency Referrals    [address] [facility] residents only)    [facility]  [address] [place] [address]. [phone]   [address]  [phone]   [facility]  [phone]   [facility]  (entrance on [address] [facility]  [facility] [facility], [address]  [phone]   [facility]   [address] [facility].  [phone]   [facility] [address] [place] [address].  [phone]       [address] [place] [address] offering 2021 [address].  [phone]   [facility].  [phone]     [facility]  [address] 2nd floor  [phone]   [facility]  [address]  [address] [phone] ([address])  [place]: [address]: [address]  [address] 4092 [phone] Bella Fraire/ [facility]  [phone]  Increase fluid intake  Take Prednisone as directed for taper  Follow-up with Dr. Dejesus this week  Return here if needed or of you feel worse in any way

## 2023-08-30 NOTE — ED ADULT NURSE NOTE - NS ED NURSE LEVEL OF CONSCIOUSNESS ORIENTATION
Oriented - self; Oriented - place; Oriented - time Complex Repair And Epidermal Autograft Text: The defect edges were debeveled with a #15 scalpel blade.  The primary defect was closed partially with a complex linear closure.  Given the location of the defect, shape of the defect and the proximity to free margins an epidermal autograft was deemed most appropriate to repair the remaining defect.  The graft was trimmed to fit the size of the remaining defect.  The graft was then placed in the primary defect, oriented appropriately, and sutured into place.

## 2023-09-20 NOTE — PROGRESS NOTE ADULT - PROBLEM SELECTOR PROBLEM 7
Hypophosphatemia Estlander Flap (Lower To Upper Lip) Text: The defect of the lower lip was assessed and measured.  Given the location and size of the defect, an Estlander flap was deemed most appropriate.  Using a sterile surgical marker, an appropriate Estlander flap was measured and drawn on the upper lip. Local anesthesia was then infiltrated. A scalpel was then used to incise the lateral aspect of the flap, through skin, muscle and mucosa, leaving the flap pedicled medially.  The flap was then rotated and positioned to fill the lower lip defect.  The flap was then sutured into place with a three layer technique, closing the orbicularis oris muscle layer with subcutaneous buried sutures, followed by a mucosal layer and an epidermal layer.

## 2023-09-29 NOTE — PROVIDER CONTACT NOTE (CRITICAL VALUE NOTIFICATION) - NS PROVIDER READ BACK TO LAB
Talked to patient her surgical procedure is scheduled for 10/27/2023 with Dr. Rosa Martinez in the [facility], surgical packet mailed out today yes

## 2023-10-17 NOTE — DISCHARGE NOTE NURSING/CASE MANAGEMENT/SOCIAL WORK - CASE MANAGER'S NAME
Pediatric OT Evaluation      Today's date: 10/17/2023   Patient name: Sonia Canseco      : 2021       Age: 1 y.o.       School/Grade: Not in school  MRN: [id number]  Referring provider: Shelby Allen MD  Dx:   Encounter Diagnosis     ICD-10-CM    1. Fine motor delay  F82           Start Time: 4240  Stop Time: 1004  Total time in clinic (min): 60 minutes    Age at onset: birth  Parent/caregiver concerns: sensory/behavior    Background   Medical History:   Past Medical History:   Diagnosis Date   • Macrocephaly    • Term  delivered by  section, current hospitalization 2020     Allergies: No Known Allergies  Current Medications:   No current outpatient medications on file. No current facility-administered medications for this visit. Assessment Methods: Standardize Testing, Clinical Observations, Questionnaires/Inventory Review, Parent/Caregiver Interview, Records Review    REASON FOR Speedy Moses is a 1.5 year old male who was referred by his speech pathologist for possible fine motor delays and sensory processing difficulties. James Rodríguez is not on any medications at this time. CASE HISTORY  James Rodríguez was born at 42 weeks gestation on 2021 via a caesarean section. The APGAR score is unknown. He weighed 6 pounds 13 ounces and was 19 inches long. Elmer mother suffered from hyperemesis gravidarum. Mrs. Brandon Alicea reports that Elmer’s developmental milestones were normal.  He sat at around 4 months months, crawled at 7 months and walked by 10 months. Elmer spoke his first word at ~6months of age but then he struggles to progress. Elmer received speech therapy at [facility] 2x/week. Elmer requires assistance to dress. He is unable to do clothing fasteners. Mrs. Helms reports that James Rodríguez is interested and play and will engaged with other children. James Rodríguez lives with his biological parents and his 5year old sister. COGNITION/BEHAVIOR:  Derek Wiggins came into the evaluation with mild hesitation escorted by his parents who were present during the evaluation. Elmer sat at the table independently but struggled to sit still. Elmer demonstrated fair attention and participation to task in this one-on-one testing environment. He was able to follow simple instructions and needed visual directions at times. He would occasionally have difficulty transitioning from one task to another. Elmer’s eye contact was fair. No pain was noted during the evaluation. Behavior list:  Difficulty participating in standardized testing  Decreased attention to task  Difficulty sitting still  Easily distracted by environment  Child requires encouragement for engagement in activities  Difficulty transitioning between activities   Separates easily from their parent/caregiver  Able to follow simple one step directions  Impulsive at times  Sensory seeking behaviors present  Inappropriate aggressive behaviors  Throws temper tantrums  Poor safety awareness      ASSESSEMENTS    VMI  The [facility] Developmental Test of Visual-Motor Integration 6th edition [facility]) is designed to assess the extent to which individuals can integrate their visual and motor abilities. This assessment is used on children age 1 to adults. The purpose of this assessment is to identify if a child needs special assistance in this area. On the [facility] Developmental Test of Visual Motor Integration 6th edition (VMI), Elmer scored low ranking in the 6th percentile for visual motor integration. Visual motor integration is the degree to which visual perception and finger-hand movements are well coordinated. Elmer held the pencil with an immature grasp, slightly pronated fisted grasp in his left hand and would occasionally switch to the right hand. Elmer was not tested for visual perception and motor coordination skills due to the inability to follow directions. Below is a breakdown of his scores. TEST                   Raw Score           Standard Score    PercentileOther Score  Visual-Motor Integration 3  77 6%            <2.4  Low       PDMS-2  Peabody Developmental Motor Scales Second Edition (PDMS-2) is an individually administered test that measures gross and fine motor skills for children ages birth through five years of age. PDMS-2 is composed of six subtests that measure interrelated motor abilities that develop early in life. The information gathered is very useful in planning a program for the child and a good indicator of the performance the child will achieve in a learning environment that is sensitive to the child’s particular needs. Elmer was tested with the PDMS-2. He performed the test in a well-lighted and ventilated room with no distractions. This test took approximately 30 minutes to complete. Elmer scored in the following age equivalencies and percentile ranks. Subtest       Months                Percentile Rank       Other Score   Grasping                              20                             5%                     Poor  Visual-Motor Integration      23                             2%                     Poor  After the age equivalencies were compiled, the percentile rank was given for the following:     Percentile Ranks  Other Score  Fine Motor                        1%                                    Very Poor    Results of testing indicated that Kyung Villeda has a 19 to 22 month delay in fine motor development. Elmer ranked very poor in the 1st percentile for fine motor skills, which included the following subtests, grasping and visual-motor integration. The grasping subtest, measures the child’s ability to use his or her hands.   It begins with the ability to hold an object with one hand and then progress up to actions involving the controlled use of the fingers and both hands in tasks such as buttoning and unbuttoning garments. The visual-motor subtest measures a child’s ability to use visual perceptual skills to perform complex eye-hand coordination tasks such as reaching and grasping for an object, building with blocks and copying designs. Elmer scored poor in both subtests, grasping and visual-motor integration. Elmer demonstrates the inability to use his hands and arms to grasp objects, stack blocks, draw figures and manipulate objects. LAP  The Learning Accomplishment Profile (LAP) is a simple criterion referenced tool for systematic assessment of a child’s existing skills so that developmentally appropriate learning objectives can be identified. It was used to identify skill levels in the areas of fine motor, gross motor, prewriting, and self-help skills. A fine motor screening was performed to see if Bridget Elias demonstrates any areas of delays based on a 1-year-old scoring criterion. Due to Ambrose possible delays in fine motor, pre-writing and self-help skills, the LAP (Learning Accomplishment Profile) was used as a guide for assessing his skill level. Elmer’s fine motor skills were solid at a 24-month level with few scattered skills up to 36 months. He is able to build a tower of 10 cubes, don pegs in a pegboard,  small objects with tongs and adaptive to a formboard reversal, which are skills of a 43 month old child. He does demonstrate a three-jaw hernan and a pincer grasp when retrieving small objects; however, inadequate in-hand manipulation is demonstrated. Pre-writing skills were solid at a 18 month level. Elmer was able to scribble spontaneously holding the marker with immature grasp. Elmer’s self-help skills were solid at the 21 month level with several scattered skills up to 42 months. Elmer requires assistance to doff & don clothing and is unable to secure clothing fasteners such as snaps, zippers and buttons.   During meal time, Elmer can use a cup with a handle but he is unable to feel himself with a spoon, which is a skill of a 19 month old. These skills were difficulty to accurately assess in terms of age equivalent. However, Tasha Jones is clearly delayed in fine motor, pre-writing and self-help skills at this time. Clinical Observations  Clinical Observations of Sensory Integration and Reflex Maturation is a test that assesses the development of basic, automatic or unconscious reflexes and skills controlled primarily by the brainstem. The brainstem is an early developing level of a child’s nervous system not usually under conscious control. These abilities are essential for further development of higher level and more consciously controlled skills such as walking, talking, and reading. Elmer was tested for clinical observations. Elmer displayed tactile defensiveness during the evaluation. He is left upper extremity and left eye dominant. Elmer demonstrated inadequate ocular motor movements when testing visual tracking. He struggles with language skills, which is addressed in speech therapy. Elmer’s muscle tone in his upper extremities was normal.  Cocontraction of his arms, shoulders, and neck was not tested due to the inability to accept touch. Elmer refused to sit, lay, kick or touch the therapy ball. He would engaged with a 12" ball by throwing in at the wall or in the air. He would not through it at the OT or his parents. He did attempt to catch a ball from a 1-2 ft distance. Most areas were unable to be assessed due to [facility] cooperation and inability to attend, follow directions. Sensory Profile 2  The Sensory Profile 2 provides a set of standardized tools for evaluating a child's sensory processing patterns in the context of everyday life. This information provides a unique way to determine how sensory processing may be contributing to or interfering with participation.   When combined with other information about the child in context, professionals can plan effective interventions to support children, families and educator as they interact with each other throughout the day. The Sensory Profile 2 contains three scoring areas: sensory system, behavior responses associated with sensory processing and quadrant scores (sensory processing pattern scores) based on a normal distribution curve (i.e. the bell curve). The Sensory Profile 2, a standardized assessment of sensory processing abilities, was administered as part of a comprehensive assessment to determine whether aspects of sensory processing might be contributing to Destini esteban performance challenges in his daily life. Elmer’s mother and father, Mr & Mrs. Josue Peter , completed the Sensory Profile 2 questionnaire. Please refer below to the Sensory Profile 2 chart for a complete breakdown of his scores. Raw Score Summary   Quadrant Scores     Seeking/Seeker 49/95 More Than Others   Avoiding/Avoider 48/100 More Than Others   Sensitivity/Sensor 43/95 More Than Others   Registration/Bystander 33/110 Just Like the Majority of Others   Sensory Sections     Auditory 23/40 Just Like the Majority of Others   Visual 9/30 Just Like the Majority of Others   Touch 16/55 Just Like the Majority of Others   Movement 17/40 Just Like the Majority of Others   Body Position 15/40 Just Like the Majority of Others   Oral 26/50 More Than Others   Behavioral Sections     Conduct 28/45 More Than Others   Social Emotional 26/70 Just Like the Majority of Others   Attentional 22/50 Just Like the Majority of Others     Quadrant Definitions   Seeking/Seeker The degree to which a child obtains sensory input. A child with a Much More Than Others score in this pattern seeks sensory input at a higher rate than others. Avoiding/Avoider The degree to which a child is bothered by sensory input. A child with a Much More Than Others score in this pattern moves away from sensory input at a higher rate than others.    Sensitivity/Sensor The degree to which a child detects sensory input. A child with a Much More Than Others score in this pattern notices sensory input at a higher rate than others. Registration/Bystander The degree to which a child misses sensory input. A child with a Much More Than Others score in this pattern misses sensory input at a higher rate than others. Assessment  Understanding of Dx/Px/POC: good   Prognosis: good    Plan  Patient would benefit from: OT eval and skilled occupational therapy  Planned therapy interventions: ADL training, motor coordination training, neuromuscular re-education, behavior modification, fine motor coordination training, sensory integrative techniques, strengthening, therapeutic activities, therapeutic exercise and home exercise program  Frequency: Pt will be seen for OT 1-2xs/week for 6 months to 1 year.   Treatment plan discussed with: caregiver down into quadrants; seeking, avoiding, sensitivity and registration. The quadrant summary is another way to consider a child’s scores. The quadrants reveal patterns to a child’s response to stimuli. Elmer scored “more than others” in sensory seeking, avoiding, and sensitivity and “just like the majority of others” in registration. In sensory seeking, Zain Greene is more interested in sensory experiences than others. Children with sensory seeking tend to enjoy sensory input. They move more, hum or rub their hands on things throughout the day. Elmer’s interest and pleasure with sensory input may lead to task completion difficulties because he may get distracted with new sensory experiences and lose track of daily life tasks. In sensory avoiding, Zain Greene is more likely to be overwhelmed by sensory experiences than others. When a child is bothered by sensory experiences, they may appear to be more interested in being alone or in very quiet places. When these experiences and environments are too challenging for them, they withdraw and may not complete daily tasks. In sensory sensitivity, Elmer detects more sensory cues than others. Children who score “more than others” in this area may appear distractible or hyperactive. These children react more quickly to stimuli in the environment, even those stimuli that others don’t detect. It would be helpful to reduce sensations in environments during activities in order for Elmer to be successful. In addition, when Zain Greene is in a stimulating environment or activity, it would benefit him to have breaks from the situation. The goal for Zain Greene is to incorporate additional sensory input into his routines to meet thresholds while conducting daily life.       IMPRESSIONS  Results of the VMI, Peabody Developmental Motor Scales Second Edition (PDMS-2), LAP, Clinical Observations, and The Sensory Profile 2 determine that Elmer would benefit from occupational therapy services. Elmer displayed below average scores in the VMI. He scored "low" in the 6th percentile for visual motor integration. The visual perception and motor integration subtest were unable to be assessed at this time. In the PDMS-2, Kyung Villeda exhibits a 23 to 22 month delay in fine motor development. Elmer scored poor in the 1st percentile for fine motor skills. In addition the LAP noted Elmer’s delays in fine motor, pre-writing and self-help. Other deficits that contribute to delays in fine and gross motor skills are difficulties in direction following, attention, tactile defensiveness, inadequate visual tracking, gravitational intolerance and decreased strength in his trunk, which were noted in clinical observations. The Sensory Profile 2 determined that Elmer displays sensory integration dysfunction with “more than others” scores in the sensory and behavioral section. Difficulty in these sensory systems means that these particular types of sensory input may be confusing, upsetting or not meaningful to Elmer. Difficulties with sensory input can interfere with Elmer’s ability to complete important activities successfully. Overall, his above delays are having a significant functional impact on his ability to perform appropriately in his home, school, and leisure environments. Assessment  Understanding of Dx/Px/POC: good   Prognosis: good    Goals  LTG (6 months- 1 year): Pt will improve motor planning and coordination of fine motor/visual motor/bilateral skills to an age appropriate level as evidenced by standardized assessments. STGS (0-6 months):  Pt will don a 6-8 bead pattern, 3 out of 4 trials. ,   Pt will be able to transfer a small peg/object from palm to fingertip while holding multiple objects in his palm (with the R & L hand), 3 out of 4 trials. Pt will cut a straight line staying within 1/4" of line with good accuracy 3 out of 4 trials.   Pt will cut an angular line staying within 1/4" of line with good accuracy 3 out of 4 trials. Pt will cut a curved line staying within 1/4" of line with good accuracy 3 out of 4 trials. LTG (6 months- 1 year): Pt will improve activities of daily living such as dressing. STGS (0-6 months):  Pt will independently button. Pt will independently zip. Pt will independently snap. Pt will independently buckle a belt    LTG (6 months- 1 year): Pt will improve pre-writing/hand writing skills for improved functional performance in home and classroom tasks. STGS (0-6 months):  Pt will hold a crayon with a mature grasp and color/scribble specific parts of a picture, with good accuracy using small refined movement of his fingers. Pt will color in shapes, staying in side the lines with fair+/good accuracy and using an appropriate grasp 3 out of 4 trials. Pt will copy simple shapes such as a vertical & horizontal lines, Knik & cross, 3 out of 4 trials. LTG (6 months- 1 year): Pt will improve visual perception skills to an average range as evidenced by a standardized assessment. STGS (0-6 months):  Pt will identify 3-4 objects in a hidden picture independently, 4 out of 5 trials. LTG (6 months- 1 year): Pt will improve ocular motor skills in order to improve writing and reading skills. STGS (0-6 months):  Pt will visually track objects during treatment sessions using smooth eye movements across midline without head movement, 80% of the time. LTG (6 months- 1 year): Pt will improve sensory processing to decrease inappropriate behaviors and to understand and effectively respond to people and activity in home and school environments  STGS (0-6 months):  Pt family and/or school staff will begin to be able to observe signs that Valentina Jaime is becoming over-stimulated or is having a difficult time staying focused and respond with calming and/or resistive sensory diet activities so that he can learn better. LTG (6mo-1yr):   Pt will improve touch processing/tactile defensiveness. STGS (0-6 months):    Pt will demonstrate appropriate behavioral/emotional responses when he is bother by touch or clothing. Pt will recognize familiar objects placed in their hand, using sense of touch only, 90% of time. Pt will tolerate oral touch in order to brush his teeth. Pt's family will follow through with the "Brushing Program" at home to improve Elmer's tactile defensiveness. LTG (6 months- 1 year): Pt will improve vestibular (movement) and somatosensory (touch and muscle/joint input) for improved motor coordination, motor planning and attention and focus to task. STGS (0-6 months):  Pt will tolerate movement on suspension equipment in all planes of movement for 5 minutes. Pt will tolerate movement on the therapy ball in sitting, supine and prone position for 2 minutes. LTG (6 months- 1 year): Pt will demonstrate improved upper-limb coordination skills as seen by the ability to use age appropriate ball skills. STGS (0-6 months):  Pt will catch a ball from a 5 feet distance with bent elbow and not catching the ball with the chest 3 out of 4 trials. Pt will throw a ball at a designated target/person from a 5-7 ft distance, 5 times, 4 out of 5 trials. LTG (6 months- 1 year): Pt will improve strength, muscle tone and stability of the extremities and trunk to facilitate better co-contraction between flexor and extensor muscle groups needed for gross motor, fine motor and postural control while working and playing on steady and unstable surfaces. STGS (0-6 months):   Pt will propel self with UE on a scooter board a distance of 50 feet without struggle, 4 of 5 trials. Pt will wheelbarrow walk a distance of 15 feet forwards with support at ankles, without sagging of  back or stiff/locked arms, 4 of 5 trials.     Parent Goal- "Improve sensory and function at age appropriate level."                Plan  Patient would benefit from: OT eval and skilled occupational therapy  Planned therapy interventions: ADL training, motor coordination training, neuromuscular re-education, behavior modification, fine motor coordination training, sensory integrative techniques, strengthening, therapeutic activities, therapeutic exercise and home exercise program  Frequency: Pt will be seen for OT 1-2xs/week for 6 months to 1 year.   Treatment plan discussed with: caregiver Sonya Barrios

## 2023-10-17 NOTE — ED PROVIDER NOTE - DISPOSITION TYPE
Adonis Vieira  Neurosurgery  [address] [address]  Phone: [phone]  Fax: [phone]  Follow Up Time:     Neelima Batres  Plastic Surgery  [address] [facility], [address]  [place] [address]  Phone: [phone]  Fax: [phone]  Follow Up Time:     Nick Birmingham  Pain Medicine  [address] [facility]  [place] [address]  Phone: [phone]  Fax: [phone]  Follow Up Time:    ADMIT

## 2023-10-25 NOTE — DISCHARGE NOTE ADULT - MEDICATION SUMMARY - MEDICATIONS TO STOP TAKING
I will STOP taking the medications listed below when I get home from the hospital:  None Calcipotriene Pregnancy And Lactation Text: The use of this medication during pregnancy or lactation is not recommended as there is insufficient data.

## 2023-11-06 NOTE — ED ADULT NURSE NOTE - NS ED NURSE LEVEL OF CONSCIOUSNESS MENTAL STATUS
Ambulatory Care Coordination Note  2023    Patient Current Location:  Home: [address] [place] [address]     ACM contacted the patient by telephone. Verified name and  with patient as identifiers. Provided introduction to self, and explanation of the ACM role. Challenges to be reviewed by the provider   Additional needs identified to be addressed with provider: No  none               Method of communication with provider: none. ACM: Marco Hendrix, RN  Spoke with pt who said she is feeling better today. She did lose 4 pounds over the weekend cardiology had her take extra lasix for 3 days and go back on aldactone. She will have labs redone in one week. She will see GI end of next week. 1) acp wants information   2) sdoh done   3) med review  4) rpm   5) fu cardiology [facility] needs apt   6) fu ob office  7) fu endo ([facility])   8) fu pcp   9) pulm fu   10) pulm nodule clinic  done   11) dr Casey Richardson   12) colon egd 10/25 done      Offered patient enrollment in the Remote Patient Monitoring (RPM) program for in-home monitoring: Yes, patient already enrolled. Lab Results       None                 Goals Addressed                   This Visit's Progress     Conditions and Symptoms   On track     I will schedule office visits, as directed by my provider. I will keep my appointment or reschedule if I have to cancel. I will notify my provider of any barriers to my plan of care. I will follow my Zone Management tool to seek urgent or emergent care.     Barriers: overwhelmed by complexity of regimen  Plan for overcoming my barriers: care coordination   Confidence: 9/10  Anticipated Goal Completion Date: 11/15/23                Future Appointments   Date Time Provider [address]   2023 10:45 AM France Tang MD Resp Spec [id number]   2024  2:30 PM Bartolome Thomason MD [facility] MED Adarsh December
Cooperative/Awake/Alert

## 2023-11-14 NOTE — ED PROVIDER NOTE - SKIN, MLM
abrasion noted to left lateral thigh without any active drainage/erythema/swelling/streaking/increased warmth, old healed surgical scars noted to left thigh

## 2023-11-14 NOTE — CONSULT NOTE ADULT - SUBJECTIVE AND OBJECTIVE BOX
[facility]  INFECTIOUS DISEASES   [address] [place] [address]  Tel: [phone]     Fax: [phone]  ========================================================  MD Noman Perry Kaushal, MD Cho, Michelle, MD Sunjit, Jaspal, MD  ========================================================    MRN-[id number]  YUE      CC: left thigh wound discharge   HPI:  82yo man with PMH of HTN, COPD on home O2, CAD s/p CABG, PVD s/p stents in b/l legs and left femoral fracture more than 50 years ago, know to me from past admissions   came to ED with left thigh discharge. He has a large collection in left thigh with OM on the site of surgery. Seen by ortho, not a candidate for surgery.   MRI showed collection, intraosseous abscess  S/P IR drain placement on 21  IR culture NGTD but had another culture with enterobacter.   Completed 6 weeks of ceftriaxone 2gm with PICC line in 2022  He was seen in the clinic and was started on suppressive ciprofloxacin for good oral bioavailability and also based on the culture.  Today noted small yellow discharge after scratching the area for which used a guaze but later area was closed, no more discharge.   No fever or chills or any other symptoms still taking his suppressive cipro regularly.     PAST MEDICAL & SURGICAL HISTORY:  Diabetes Mellitus, Type II  CAD (Coronary Artery Disease)  s/p 3v CABG ; stents placed in [place] in   Dyslipidemia  Osteomyelitis  COPD (chronic obstructive pulmonary disease)  on 2L at home and BiPAP at night; intubated   Hypertension  PVD (peripheral vascular disease)  History of PAT (paroxysmal atrial tachycardia)  Asthma with COPD  BPH (benign prostatic hyperplasia)  Acute osteomyelitis  CABG (Coronary Artery Bypass Graft)    Compound fracture  left leg  S/P primary angioplasty with coronary stent  H/O drainage of abscess  Left femur 2021    Social Hx: No current smoking, EtOH or drugs     FAMILY HISTORY:  Family history of diabetes mellitus (Sibling)    Family hx of lung cancer  brother,  age 82, used to smoke with pt    Allergies  No Known Allergies    Antibiotics:  Ciprofloxacin      REVIEW OF SYSTEMS:  CONSTITUTIONAL:  No Fever or chills  HEENT:  No diplopia or blurred vision.  No sore throat or runny nose.  CARDIOVASCULAR:  No chest pain or SOB.  RESPIRATORY:  No cough, shortness of breath, PND or orthopnea.  GASTROINTESTINAL:  No nausea, vomiting or diarrhea.  GENITOURINARY:  No dysuria, frequency or urgency. No Blood in urine  MUSCULOSKELETAL:  no joint aches, no muscle pain  SKIN:  left thigh wound opened with discharge   NEUROLOGIC:  No paresthesias or weakness.  PSYCHIATRIC:  No disorder of thought or mood.  ENDOCRINE:  No heat or cold intolerance, polyuria or polydipsia.  HEMATOLOGICAL:  No easy bruising or bleeding.     Physical Exam:  Vital Signs Last 24 Hrs  T(C): 36.6 (2023 14:15), Max: 36.6 (2023 14:15)  T(F): 97.8 (2023 14:15), Max: 97.8 (2023 14:15)  HR: 90 (2023 14:15) (90 - 90)  BP: 116/66 (2023 14:15) (116/66 - 116/66)  BP(mean): --  RR: 18 (2023 14:15) (18 - 18)  SpO2: 100% (2023 14:15) (100% - 100%)  Parameters below as of 2023 14:15  Patient On (Oxygen Delivery Method): nasal cannula  O2 Flow (L/min): 2  Height (cm): 180.3 ( @ 14:15)  Weight (kg): 102.1 ( @ 14:15)  BMI (kg/m2): 31.4 ( @ 14:15)  BSA (m2): 2.22 ( @ 14:15)  GEN: NAD  HEENT: normocephalic and atraumatic. EOMI. PERRL.    NECK: Supple.  No lymphadenopathy   LUNGS: poor air movement   HEART: Regular rate and rhythm   ABDOMEN: Soft, nontender, and nondistended.  Positive bowel sounds.    : No CVA tenderness  EXTREMITIES: left thigh with scar in lateral side with fluctuation and small crusted area that   had yellow discharge as per wife, now clean and dry. no sign of cellulitis   NEUROLOGIC: grossly intact.  PSYCHIATRIC: Appropriate affect .  SKIN: No rash     All labs, imaging and other data have been reviewed.    Assessment and Plan:   82yo man with PMH of HTN, COPD on home O2, CAD s/p CABG, PVD s/p stents in b/l legs and left femoral fracture more than 50 years ago, know to me from past admissions   came to ED with left thigh discharge. He has a large collection in left thigh with OM on the site of surgery. Seen by ortho, not a candidate for surgery.   MRI showed collection, intraosseous abscess  S/P IR drain placement on 21  IR culture NGTD but had another culture with enterobacter.   Completed 6 weeks of ceftriaxone 2gm with PICC line in 2022  He was seen in the clinic and was started on suppressive ciprofloxacin for good oral bioavailability and also based on the culture.  Today noted small yellow discharge after scratching the area for which used a guaze but later area was closed, no more discharge.   No fever or chills or any other symptoms still taking his suppressive cipro regularly.     Will continue watching the area, if the crusted area opens up with recurrent discharge can come to ED otherwise can continue suppressive cipro.   Also if fever, chills, worsening of pain, or any other symptom will come to ED.   Will see her in the clinic PRN or next scheduled appointment.     Thank you for courtesy of this consult.     Discussed with the primary team.     Brandi العلي MD  Division of Infectious Diseases   Please call ID service at [phone] with any question.    75 minutes spent on total encounter assessing patient, examination, chart review, counseling and coordinating care by the attending physician/nurse/care manager.

## 2023-11-14 NOTE — ED PROVIDER NOTE - NSFOLLOWUPINSTRUCTIONS_ED_ALL_ED_FT
Follow-up with Dr. Umaña for reevaluation, ongoing care and treatment.  Keep abrasion/wound clean and dry.  If having worsening of symptoms, wound discharge, fever, pain, redness or other related symptoms, return to the ER immediately.

## 2023-11-14 NOTE — ED ADULT TRIAGE NOTE - STATUS:
Impression: Lattice degeneration of retina, left eye: H35.412. Plan: Discussed diagnosis in detail with patient. Will continue to observe condition and or symptoms. No treatment is required at this time. Discussed signs and symptoms of PVD/floaters/RD. Call promptly if they occur. Applied

## 2023-11-14 NOTE — ED PROVIDER NOTE - PATIENT PORTAL LINK FT
You can access the FollowMyHealth Patient Portal offered by [facility] by registering at the following website: [email or web address]. By joining [employer]’s FollowMyHealth portal, you will also be able to view your health information using other applications (apps) compatible with our system.

## 2023-11-14 NOTE — ED PROVIDER NOTE - CLINICAL SUMMARY MEDICAL DECISION MAKING FREE TEXT BOX
concern for drainage from wound, most likely related to abrasion. no active drainage. no signs of localized infection. to be seen by ID in the ED.

## 2023-11-14 NOTE — ED PROVIDER NOTE - ATTENDING APP SHARED VISIT CONTRIBUTION OF CARE
Patient came into the ED with his wife for evaluation of drainage from his left thigh wound. patient scratched his thigh and noted yellow drainage and called his ID doctor who recommended he come in for evaluation. no fever. h/o chronic osteomyelitis of his femur on cipro daily.     A&Ox3, NAD. ambulatory with O2. +healing incisional wound left thigh with 1cm open scratch rosendo to incisional area. no signs of localized infection. no drainage. no erythema. good distal PMS.

## 2023-11-14 NOTE — ED ADULT NURSE NOTE - OBJECTIVE STATEMENT
Pt presents to the ED s/p left lateral thigh wound check. Small superficial linear noted, Pt states' I was scratching the area yesterday because it was itchy.

## 2023-11-14 NOTE — ED PROVIDER NOTE - PROGRESS NOTE DETAILS
Patient seen by Dr. العلي in ED and cleared for discharge.  Advised patient does not need any work-up or further antibiotics at this time.  Can discharge patient home and follow-up with her outpatient. strict return precautions given for worsening drainage/fever/pain or other related symptoms.

## 2023-11-14 NOTE — ED PROVIDER NOTE - CARE PROVIDER_API CALL
Brandi العلي  Infectious Disease  [address] [place] [address]  Phone: [phone]  Fax: [phone]  Follow Up Time: 1-3 Days

## 2023-11-14 NOTE — ED PROVIDER NOTE - OBJECTIVE STATEMENT
83-year-old male with history of history of Hypertension, COPD home oxygen dependent, CAD CABG, Afib on eliquis, chronic left femor osteomyelitis since traumatic left femoral fracture in 1966 followed by ID, Dr. العلي, DM presents with c/o drainage from wound of left femur today.  States that he noticed mild yellow drainage from a new wound on his left thigh today, spoke to Dr. العلي and was advised to come to ER for evaluation.  Denies fever, chills, pain, trauma, numbness or other symptoms.  States that he is on Cipro 500 mg twice daily for 1 year.  PCP-Dr. Sarah Avila 83-year-old male with history of history of Hypertension, COPD home oxygen dependent, CAD CABG, Afib on eliquis, chronic left femor osteomyelitis since traumatic left femoral fracture in 1966 followed by ID, Dr. العلي, DM presents with c/o drainage from wound of left femur today.  States that he scratched his left thigh, noticed one episode of mild yellow drainage from his left thigh today, spoke to Dr. العلي and was advised to come to ER for evaluation. Denies further drainage from thigh. Denies fever, chills, pain, trauma, numbness or other symptoms.  States that he is on Cipro 500 mg twice daily for 1 year.  PCP-Dr. Sarah Avila

## 2023-11-14 NOTE — ED PROVIDER NOTE - MUSCULOSKELETAL, MLM
left leg: abrasion noted to left lateral thigh without any active drainage/erythema/swelling/streaking/increased warmth/tenderness, old healed surgical scars noted to left thigh, FROM left leg, toes warm & mobile, distal pulses and sensation intact, NVI left leg: mild dry blood was noted on the gauze, abrasion noted to left lateral thigh without any active drainage/erythema/swelling/streaking/increased warmth/tenderness, old healed surgical scars noted to left thigh, FROM left leg, toes warm & mobile, distal pulses and sensation intact, NVI

## 2023-12-16 NOTE — ED ADULT TRIAGE NOTE - CHIEF COMPLAINT QUOTE
[facility]  Progress Note  Name: Kathi Ferris I  MRN: [id number]  Unit/Bed#: -01 I Date of Admission: 12/15/2023   Date of Service: 12/16/2023 I Hospital Day: 1    Assessment/Plan   Chronic respiratory failure with hypoxia (HCC)  Assessment & Plan  Chronically on 2 to 3 L/min.  At baseline.  Monitor respiratory status closely    Generalized anxiety disorder  Assessment & Plan  Continue home Ativan, Wellbutrin, Zoloft    Hyponatremia  Assessment & Plan  Chronic and mild hyponatremia at 132 today  Secondary to lung cancer  Repeat BMP tomorrow  Continue Lasix  Avoid IV hydration    Non-small cell cancer of right lung (HCC)  Assessment & Plan  CT chest with stability of disease and new left pleural effusion which is likely malignant  Continue outpatient follow-up with pulmonary, oncology, palliative care    Essential hypertension  Assessment & Plan  Blood pressure is elevated in the ER, 182/92  Home regimen: Micardis 80 mg, verapamil 360 mg daily, Lasix 20 mg daily   Start lopressor- for HR and BP  Did not need Hydralazine PRN overnight    COPD (chronic obstructive pulmonary disease) (HCC)  Assessment & Plan  Does not appear to be in acute COPD exacerbation  Continue home inhaler regimen  Monitor respiratory status  Continue outpatient follow-up with pulmonary    * Malignant pleural effusion  Assessment & Plan  With right lung asept catheter in place, drain 3x weekly by VNA  Now with large LEFT pleural effusion, likely also malignant.  No plan for thora by IR today  Consulted pulm- plan for thora bedside AM             VTE Pharmacologic Prophylaxis:   VTE Score: 5 High Risk (Score >/= 5) - Pharmacological DVT Prophylaxis Ordered: Heparin. Sequential Compression Devices Ordered.    Mechanical VTE Prophylaxis in Place: Yes    Patient Centered Rounds: I have performed bedside rounds with nursing staff today.    Discussions with Specialists or Other Care Team Provider: pulm,  IR    Education and Discussions with Family / Patient: Updated  () at bedside.    Current Length of Stay: 1 day(s)    Current Patient Status: Inpatient     Discharge Plan / Estimated Discharge Date: Anticipate discharge tomorrow to home.    Code Status: Level 3 - DNAR and DNI      Subjective:   Patient on 2 lof O2, resting comfortable, stated talking and walking will make her SOB. BP overnight was high, did not receive Hydralaizne    Objective:     Vitals:   Temp (24hrs), Av.6 °F (37 °C), Min:98.2 °F (36.8 °C), Max:99 °F (37.2 °C)    Temp:  [98.2 °F (36.8 °C)-99 °F (37.2 °C)] 98.2 °F (36.8 °C)  HR:  [] 81  Resp:  [16-26] 16  BP: (157-193)/(85-98) 157/89  SpO2:  [94 %-97 %] 95 %  There is no height or weight on file to calculate BMI.     Input and Output Summary (last 24 hours):       Intake/Output Summary (Last 24 hours) at 2023 1646  Last data filed at 2023 1612  Gross per 24 hour   Intake 1250 ml   Output 150 ml   Net 1100 ml       Physical Exam:     Physical Exam  Vitals and nursing note reviewed.   Constitutional:       General: She is not in acute distress.     Appearance: She is well-developed.   HENT:      Head: Normocephalic and atraumatic.      Mouth/Throat:      Mouth: Mucous membranes are moist.      Pharynx: Oropharynx is clear.   Eyes:      Conjunctiva/sclera: Conjunctivae normal.   Cardiovascular:      Rate and Rhythm: Regular rhythm. Tachycardia present.      Heart sounds: No murmur heard.  Pulmonary:      Effort: Pulmonary effort is normal. No respiratory distress.      Comments: Decreased breath sounds lower lung base, right rhonchi with decreased sounds   Abdominal:      Palpations: Abdomen is soft.      Tenderness: There is no abdominal tenderness.   Musculoskeletal:         General: No swelling.      Cervical back: Neck supple.   Skin:     General: Skin is warm and dry.      Capillary Refill: Capillary refill takes less than 2 seconds.   Neurological:       Mental Status: She is alert.   Psychiatric:         Mood and Affect: Mood normal.          Additional Data:     Labs:  Results from last 7 days   Lab Units 12/16/23  0810   WBC Thousand/uL 2.94*   HEMOGLOBIN g/dL 9.9*   HEMATOCRIT % 31.9*   PLATELETS Thousands/uL 442*   NEUTROS PCT % 46   LYMPHS PCT % 22   MONOS PCT % 30*   EOS PCT % 0     Results from last 7 days   Lab Units 12/16/23  0810 12/15/23  1726   SODIUM mmol/L 132* 132*   POTASSIUM mmol/L 3.7 3.5   CHLORIDE mmol/L 100 99   CO2 mmol/L 26 24   BUN mg/dL 6 9   CREATININE mg/dL 0.44* 0.46*   ANION GAP mmol/L 6 9   CALCIUM mg/dL 8.8 9.2   ALBUMIN g/dL  --  3.2*   TOTAL BILIRUBIN mg/dL  --  0.20   ALK PHOS U/L  --  98   ALT U/L  --  12   AST U/L  --  18   GLUCOSE RANDOM mg/dL 93 106     Results from last 7 days   Lab Units 12/16/23  0810   INR  0.95             Results from last 7 days   Lab Units 12/15/23  1726   LACTIC ACID mmol/L 0.9       Imaging: Reviewed radiology reports from this admission including: chest CT scan  Personally reviewed the following imaging: chest CT scan    Recent Cultures (last 7 days):     Results from last 7 days   Lab Units 12/15/23  1726   BLOOD CULTURE  Received in Microbiology Lab. Culture in Progress.  Received in Microbiology Lab. Culture in Progress.       Lines/Drains:  Invasive Devices       Central Venous Catheter Line  Duration             Port A Cath 04/18/23 Right Subclavian 242 days              Peripheral Intravenous Line  Duration             Peripheral IV 12/15/23 Left Antecubital <1 day              Drain  Duration             Pleural Effusion Long-Term Catheter 15.5 Fr. 235 days                    Telemetry:        Last 24 Hours Medication List:   Current Facility-Administered Medications   Medication Dose Route Frequency Provider Last Rate    albuterol  2.5 mg Nebulization Q6H PRN Clay Campos PA-C      atorvastatin  20 mg Oral Daily Clay Campos PA-C      buPROPion  300 mg Oral Daily Clay Campos  JOHN      diphenhydrAMINE  50 mg Oral Q6H PRN Clay Campos PA-C      fluticasone-vilanterol  1 puff Inhalation Daily Clay Campos PA-C      furosemide  20 mg Oral QAM Clay Campos PA-C      heparin (porcine)  5,000 Units Subcutaneous Q8H ULISES Clay Campos PA-C      hydrALAZINE  5 mg Intravenous Q6H PRN Clay Campos PA-C      LORazepam  0.5 mg Oral Q8H PRN Clay Campos PA-C      losartan  100 mg Oral Daily Clay Campos PA-C      metoprolol tartrate  25 mg Oral Q12H [facility] Noman Stauffer MD      ondansetron  4 mg Intravenous Q6H PRN Clay Campos PA-C      polyethylene glycol  17 g Oral Daily PRN Clay Campos PA-C      potassium chloride  20 mEq Oral Daily Clay Campos PA-C      sertraline  200 mg Oral Daily Clay Campos PA-C      umeclidinium  1 puff Inhalation Daily [facility] Ibarra      verapamil  360 mg Oral HS Clay Campos PA-C          Today, Patient Was Seen By: Noman Stauffer MD    ** Please Note: This note has been constructed using a voice recognition system. **      Pt was sent over by Dr العلي for Osteomyelitis of the left femur. Pt denies fever and chills.

## 2023-12-16 NOTE — ED ADULT TRIAGE NOTE - NSWEIGHTCALCTOOLDRUG_GEN_A_CORE
Addended by: SAHIL SYED on: 6/15/2021 05:03 PM     Modules accepted: Orders      used pt bibems. ems states " 777 called [phone] bc he was yelling and raised his fist to a staff member." pt is calm and cooperative with staff. pt denies complaints at this time

## 2023-12-28 NOTE — CONSULT NOTE ADULT - CONSULT REASON
Tachycardia
Left femoral abscess
coagulation issues
COURTNEY  LRTI  OP  OA  HF
R femur OM, possible abscess
CKD
dm2 uncontrolled
54

## 2024-01-01 ENCOUNTER — RESULT REVIEW (OUTPATIENT)
Age: 84
End: 2024-01-01

## 2024-01-01 ENCOUNTER — RX RENEWAL (OUTPATIENT)
Age: 84
End: 2024-01-01

## 2024-01-01 ENCOUNTER — APPOINTMENT (OUTPATIENT)
Dept: VASCULAR SURGERY | Facility: CLINIC | Age: 84
End: 2024-01-01
Payer: MEDICARE

## 2024-01-01 ENCOUNTER — APPOINTMENT (OUTPATIENT)
Dept: WOUND CARE | Facility: HOSPITAL | Age: 84
End: 2024-01-01
Payer: MEDICARE

## 2024-01-01 ENCOUNTER — APPOINTMENT (OUTPATIENT)
Dept: VASCULAR SURGERY | Facility: CLINIC | Age: 84
End: 2024-01-01

## 2024-01-01 ENCOUNTER — APPOINTMENT (OUTPATIENT)
Dept: INTERNAL MEDICINE | Facility: CLINIC | Age: 84
End: 2024-01-01

## 2024-01-01 ENCOUNTER — NON-APPOINTMENT (OUTPATIENT)
Age: 84
End: 2024-01-01

## 2024-01-01 ENCOUNTER — OUTPATIENT (OUTPATIENT)
Dept: OUTPATIENT SERVICES | Facility: HOSPITAL | Age: 84
LOS: 1 days | End: 2024-01-01
Payer: COMMERCIAL

## 2024-01-01 ENCOUNTER — TRANSCRIPTION ENCOUNTER (OUTPATIENT)
Age: 84
End: 2024-01-01

## 2024-01-01 ENCOUNTER — INPATIENT (INPATIENT)
Facility: HOSPITAL | Age: 84
LOS: 5 days | DRG: 316 | End: 2024-06-01
Attending: FAMILY MEDICINE
Payer: COMMERCIAL

## 2024-01-01 ENCOUNTER — APPOINTMENT (OUTPATIENT)
Dept: PULMONOLOGY | Facility: CLINIC | Age: 84
End: 2024-01-01
Payer: MEDICARE

## 2024-01-01 ENCOUNTER — INPATIENT (INPATIENT)
Facility: HOSPITAL | Age: 84
LOS: 21 days | Discharge: NURSING, REHAB OR LONG-TERM CARE | DRG: 310 | End: 2024-05-17
Attending: FAMILY MEDICINE | Admitting: STUDENT IN AN ORGANIZED HEALTH CARE EDUCATION/TRAINING PROGRAM
Payer: COMMERCIAL

## 2024-01-01 ENCOUNTER — OUTPATIENT (OUTPATIENT)
Dept: OUTPATIENT SERVICES | Facility: HOSPITAL | Age: 84
LOS: 1 days | Discharge: HOME | End: 2024-01-01
Payer: COMMERCIAL

## 2024-01-01 ENCOUNTER — APPOINTMENT (OUTPATIENT)
Dept: WOUND CARE | Facility: HOSPITAL | Age: 84
End: 2024-01-01

## 2024-01-01 ENCOUNTER — APPOINTMENT (OUTPATIENT)
Dept: ENDOVASCULAR SURGERY | Facility: CLINIC | Age: 84
End: 2024-01-01
Payer: MEDICARE

## 2024-01-01 ENCOUNTER — OUTPATIENT (OUTPATIENT)
Dept: OUTPATIENT SERVICES | Facility: HOSPITAL | Age: 84
LOS: 1 days | End: 2024-01-01

## 2024-01-01 VITALS
DIASTOLIC BLOOD PRESSURE: 76 MMHG | TEMPERATURE: 97.8 F | WEIGHT: 220 LBS | RESPIRATION RATE: 18 BRPM | OXYGEN SATURATION: 95 % | SYSTOLIC BLOOD PRESSURE: 115 MMHG | HEIGHT: 71 IN | HEART RATE: 88 BPM | BODY MASS INDEX: 30.8 KG/M2

## 2024-01-01 VITALS
OXYGEN SATURATION: 94 % | SYSTOLIC BLOOD PRESSURE: 132 MMHG | TEMPERATURE: 98 F | HEART RATE: 114 BPM | HEIGHT: 70 IN | WEIGHT: 220.02 LBS | RESPIRATION RATE: 18 BRPM | DIASTOLIC BLOOD PRESSURE: 81 MMHG

## 2024-01-01 VITALS
DIASTOLIC BLOOD PRESSURE: 78 MMHG | SYSTOLIC BLOOD PRESSURE: 124 MMHG | RESPIRATION RATE: 18 BRPM | HEIGHT: 71 IN | HEART RATE: 83 BPM | BODY MASS INDEX: 30.52 KG/M2 | OXYGEN SATURATION: 98 % | WEIGHT: 218 LBS

## 2024-01-01 VITALS
HEIGHT: 71 IN | OXYGEN SATURATION: 93 % | SYSTOLIC BLOOD PRESSURE: 95 MMHG | WEIGHT: 220.02 LBS | RESPIRATION RATE: 26 BRPM | DIASTOLIC BLOOD PRESSURE: 59 MMHG | HEART RATE: 106 BPM

## 2024-01-01 VITALS
TEMPERATURE: 100 F | WEIGHT: 218.48 LBS | RESPIRATION RATE: 20 BRPM | DIASTOLIC BLOOD PRESSURE: 67 MMHG | HEART RATE: 106 BPM | SYSTOLIC BLOOD PRESSURE: 119 MMHG | OXYGEN SATURATION: 94 %

## 2024-01-01 VITALS
RESPIRATION RATE: 16 BRPM | BODY MASS INDEX: 30.8 KG/M2 | TEMPERATURE: 97.9 F | HEART RATE: 95 BPM | HEIGHT: 71 IN | SYSTOLIC BLOOD PRESSURE: 107 MMHG | WEIGHT: 220 LBS | DIASTOLIC BLOOD PRESSURE: 71 MMHG | OXYGEN SATURATION: 90 %

## 2024-01-01 VITALS
HEIGHT: 71 IN | WEIGHT: 220 LBS | SYSTOLIC BLOOD PRESSURE: 119 MMHG | RESPIRATION RATE: 18 BRPM | TEMPERATURE: 98.4 F | OXYGEN SATURATION: 98 % | BODY MASS INDEX: 30.8 KG/M2 | HEART RATE: 77 BPM | DIASTOLIC BLOOD PRESSURE: 75 MMHG

## 2024-01-01 VITALS
WEIGHT: 220 LBS | BODY MASS INDEX: 30.8 KG/M2 | HEIGHT: 71 IN | SYSTOLIC BLOOD PRESSURE: 105 MMHG | TEMPERATURE: 98.2 F | HEART RATE: 86 BPM | RESPIRATION RATE: 18 BRPM | OXYGEN SATURATION: 95 % | DIASTOLIC BLOOD PRESSURE: 72 MMHG

## 2024-01-01 VITALS
BODY MASS INDEX: 30.8 KG/M2 | HEART RATE: 89 BPM | TEMPERATURE: 97.9 F | WEIGHT: 220 LBS | DIASTOLIC BLOOD PRESSURE: 69 MMHG | HEIGHT: 71 IN | SYSTOLIC BLOOD PRESSURE: 109 MMHG | RESPIRATION RATE: 18 BRPM | OXYGEN SATURATION: 99 %

## 2024-01-01 VITALS
OXYGEN SATURATION: 98 % | DIASTOLIC BLOOD PRESSURE: 70 MMHG | TEMPERATURE: 98 F | HEART RATE: 88 BPM | BODY MASS INDEX: 30.1 KG/M2 | WEIGHT: 215 LBS | SYSTOLIC BLOOD PRESSURE: 104 MMHG | HEIGHT: 71 IN | RESPIRATION RATE: 18 BRPM

## 2024-01-01 VITALS
RESPIRATION RATE: 18 BRPM | TEMPERATURE: 99 F | SYSTOLIC BLOOD PRESSURE: 116 MMHG | OXYGEN SATURATION: 100 % | DIASTOLIC BLOOD PRESSURE: 72 MMHG | HEART RATE: 100 BPM

## 2024-01-01 DIAGNOSIS — E78.00 PURE HYPERCHOLESTEROLEMIA, UNSPECIFIED: ICD-10-CM

## 2024-01-01 DIAGNOSIS — J44.9 CHRONIC OBSTRUCTIVE PULMONARY DISEASE, UNSPECIFIED: ICD-10-CM

## 2024-01-01 DIAGNOSIS — Z79.84 LONG TERM (CURRENT) USE OF ORAL HYPOGLYCEMIC DRUGS: ICD-10-CM

## 2024-01-01 DIAGNOSIS — Z79.02 LONG TERM (CURRENT) USE OF ANTITHROMBOTICS/ANTIPLATELETS: ICD-10-CM

## 2024-01-01 DIAGNOSIS — Z79.4 LONG TERM (CURRENT) USE OF INSULIN: ICD-10-CM

## 2024-01-01 DIAGNOSIS — Z95.1 PRESENCE OF AORTOCORONARY BYPASS GRAFT: ICD-10-CM

## 2024-01-01 DIAGNOSIS — I63.9 CEREBRAL INFARCTION, UNSPECIFIED: ICD-10-CM

## 2024-01-01 DIAGNOSIS — I48.91 UNSPECIFIED ATRIAL FIBRILLATION: ICD-10-CM

## 2024-01-01 DIAGNOSIS — E11.51 TYPE 2 DIABETES MELLITUS WITH DIABETIC PERIPHERAL ANGIOPATHY WITHOUT GANGRENE: ICD-10-CM

## 2024-01-01 DIAGNOSIS — Z98.890 OTHER SPECIFIED POSTPROCEDURAL STATES: ICD-10-CM

## 2024-01-01 DIAGNOSIS — J45.50 SEVERE PERSISTENT ASTHMA, UNCOMPLICATED: ICD-10-CM

## 2024-01-01 DIAGNOSIS — L84 CORNS AND CALLOSITIES: ICD-10-CM

## 2024-01-01 DIAGNOSIS — E11.621 TYPE 2 DIABETES MELLITUS WITH FOOT ULCER: ICD-10-CM

## 2024-01-01 DIAGNOSIS — Z79.01 LONG TERM (CURRENT) USE OF ANTICOAGULANTS: ICD-10-CM

## 2024-01-01 DIAGNOSIS — S91.309A UNSPECIFIED OPEN WOUND, UNSPECIFIED FOOT, INITIAL ENCOUNTER: ICD-10-CM

## 2024-01-01 DIAGNOSIS — S91.109A UNSPECIFIED OPEN WOUND OF UNSPECIFIED TOE(S) WITHOUT DAMAGE TO NAIL, INITIAL ENCOUNTER: ICD-10-CM

## 2024-01-01 DIAGNOSIS — Z98.890 OTHER SPECIFIED POSTPROCEDURAL STATES: Chronic | ICD-10-CM

## 2024-01-01 DIAGNOSIS — Z95.5 PRESENCE OF CORONARY ANGIOPLASTY IMPLANT AND GRAFT: ICD-10-CM

## 2024-01-01 DIAGNOSIS — T14.8XXA OTHER INJURY OF UNSPECIFIED BODY REGION, INITIAL ENCOUNTER: Chronic | ICD-10-CM

## 2024-01-01 DIAGNOSIS — I10 ESSENTIAL (PRIMARY) HYPERTENSION: ICD-10-CM

## 2024-01-01 DIAGNOSIS — L97.512 NON-PRESSURE CHRONIC ULCER OF OTHER PART OF RIGHT FOOT WITH FAT LAYER EXPOSED: ICD-10-CM

## 2024-01-01 DIAGNOSIS — M81.0 AGE-RELATED OSTEOPOROSIS WITHOUT CURRENT PATHOLOGICAL FRACTURE: ICD-10-CM

## 2024-01-01 DIAGNOSIS — E11.65 TYPE 2 DIABETES MELLITUS WITH HYPERGLYCEMIA: ICD-10-CM

## 2024-01-01 DIAGNOSIS — I48.20 CHRONIC ATRIAL FIBRILLATION, UNSPECIFIED: ICD-10-CM

## 2024-01-01 DIAGNOSIS — I73.9 PERIPHERAL VASCULAR DISEASE, UNSPECIFIED: ICD-10-CM

## 2024-01-01 DIAGNOSIS — Z79.2 LONG TERM (CURRENT) USE OF ANTIBIOTICS: ICD-10-CM

## 2024-01-01 DIAGNOSIS — I11.0 HYPERTENSIVE HEART DISEASE WITH HEART FAILURE: ICD-10-CM

## 2024-01-01 DIAGNOSIS — A41.9 SEPSIS, UNSPECIFIED ORGANISM: ICD-10-CM

## 2024-01-01 DIAGNOSIS — Z95.5 PRESENCE OF CORONARY ANGIOPLASTY IMPLANT AND GRAFT: Chronic | ICD-10-CM

## 2024-01-01 DIAGNOSIS — E11.40 TYPE 2 DIABETES MELLITUS WITH DIABETIC NEUROPATHY, UNSPECIFIED: ICD-10-CM

## 2024-01-01 DIAGNOSIS — Z79.899 OTHER LONG TERM (CURRENT) DRUG THERAPY: ICD-10-CM

## 2024-01-01 DIAGNOSIS — I50.9 HEART FAILURE, UNSPECIFIED: ICD-10-CM

## 2024-01-01 DIAGNOSIS — J96.12 CHRONIC RESPIRATORY FAILURE WITH HYPOXIA: ICD-10-CM

## 2024-01-01 DIAGNOSIS — Z51.5 ENCOUNTER FOR PALLIATIVE CARE: ICD-10-CM

## 2024-01-01 DIAGNOSIS — B37.0 CANDIDAL STOMATITIS: ICD-10-CM

## 2024-01-01 DIAGNOSIS — J96.11 CHRONIC RESPIRATORY FAILURE WITH HYPOXIA: ICD-10-CM

## 2024-01-01 DIAGNOSIS — R79.89 OTHER SPECIFIED ABNORMAL FINDINGS OF BLOOD CHEMISTRY: ICD-10-CM

## 2024-01-01 DIAGNOSIS — M86.60 OTHER CHRONIC OSTEOMYELITIS, UNSPECIFIED SITE: ICD-10-CM

## 2024-01-01 DIAGNOSIS — E11.9 TYPE 2 DIABETES MELLITUS WITHOUT COMPLICATIONS: ICD-10-CM

## 2024-01-01 DIAGNOSIS — M86.10 OTHER ACUTE OSTEOMYELITIS, UNSPECIFIED SITE: ICD-10-CM

## 2024-01-01 DIAGNOSIS — Z71.89 OTHER SPECIFIED COUNSELING: ICD-10-CM

## 2024-01-01 DIAGNOSIS — N17.9 ACUTE KIDNEY FAILURE, UNSPECIFIED: ICD-10-CM

## 2024-01-01 DIAGNOSIS — Z29.9 ENCOUNTER FOR PROPHYLACTIC MEASURES, UNSPECIFIED: ICD-10-CM

## 2024-01-01 DIAGNOSIS — Z78.9 OTHER SPECIFIED HEALTH STATUS: ICD-10-CM

## 2024-01-01 DIAGNOSIS — E11.51 TYPE 2 DIABETES MELLITUS WITH DIABETIC PERIPHERAL ANGIOPATHY W/OUT GANGRENE: ICD-10-CM

## 2024-01-01 DIAGNOSIS — L97.519 TYPE 2 DIABETES MELLITUS WITH FOOT ULCER: ICD-10-CM

## 2024-01-01 DIAGNOSIS — I25.10 ATHEROSCLEROTIC HEART DISEASE OF NATIVE CORONARY ARTERY WITHOUT ANGINA PECTORIS: ICD-10-CM

## 2024-01-01 DIAGNOSIS — J44.89 OTHER SPECIFIED CHRONIC OBSTRUCTIVE PULMONARY DISEASE: ICD-10-CM

## 2024-01-01 DIAGNOSIS — R09.89 OTHER SPECIFIED SYMPTOMS AND SIGNS INVOLVING THE CIRCULATORY AND RESPIRATORY SYSTEMS: ICD-10-CM

## 2024-01-01 LAB
A1C WITH ESTIMATED AVERAGE GLUCOSE RESULT: 6.8 % — HIGH (ref 4–5.6)
A1C WITH ESTIMATED AVERAGE GLUCOSE RESULT: 7.4 % — HIGH (ref 4–5.6)
ACETONE SERPL-MCNC: NEGATIVE — SIGNIFICANT CHANGE UP
ALBUMIN SERPL ELPH-MCNC: 1.9 G/DL — LOW (ref 3.3–5)
ALBUMIN SERPL ELPH-MCNC: 2 G/DL — LOW (ref 3.3–5)
ALBUMIN SERPL ELPH-MCNC: 2.1 G/DL — LOW (ref 3.3–5)
ALBUMIN SERPL ELPH-MCNC: 2.4 G/DL — LOW (ref 3.3–5)
ALBUMIN SERPL ELPH-MCNC: 2.4 G/DL — SIGNIFICANT CHANGE UP (ref 3.3–5)
ALBUMIN SERPL ELPH-MCNC: 2.6 G/DL — LOW (ref 3.3–5)
ALBUMIN SERPL ELPH-MCNC: 2.7 G/DL — LOW (ref 3.3–5)
ALBUMIN SERPL ELPH-MCNC: 2.8 G/DL — LOW (ref 3.3–5)
ALBUMIN SERPL ELPH-MCNC: 2.8 G/DL — LOW (ref 3.3–5)
ALBUMIN SERPL ELPH-MCNC: 2.9 G/DL — LOW (ref 3.3–5)
ALBUMIN SERPL ELPH-MCNC: 3 G/DL — LOW (ref 3.3–5)
ALBUMIN SERPL ELPH-MCNC: 3.1 G/DL — LOW (ref 3.3–5)
ALBUMIN SERPL ELPH-MCNC: 3.4 G/DL — SIGNIFICANT CHANGE UP (ref 3.3–5)
ALBUMIN SERPL ELPH-MCNC: 4.3 G/DL
ALP BLD-CCNC: 127 U/L
ALP SERPL-CCNC: 101 U/L — SIGNIFICANT CHANGE UP (ref 40–120)
ALP SERPL-CCNC: 102 U/L — SIGNIFICANT CHANGE UP (ref 40–120)
ALP SERPL-CCNC: 104 U/L — SIGNIFICANT CHANGE UP (ref 40–120)
ALP SERPL-CCNC: 105 U/L — SIGNIFICANT CHANGE UP (ref 40–120)
ALP SERPL-CCNC: 107 U/L — SIGNIFICANT CHANGE UP (ref 40–120)
ALP SERPL-CCNC: 108 U/L — SIGNIFICANT CHANGE UP (ref 40–120)
ALP SERPL-CCNC: 109 U/L — SIGNIFICANT CHANGE UP (ref 40–120)
ALP SERPL-CCNC: 130 U/L — HIGH (ref 40–120)
ALP SERPL-CCNC: 135 U/L — HIGH (ref 40–120)
ALP SERPL-CCNC: 167 U/L — HIGH (ref 40–120)
ALP SERPL-CCNC: 171 U/L — HIGH (ref 40–120)
ALP SERPL-CCNC: 95 U/L — SIGNIFICANT CHANGE UP (ref 40–120)
ALP SERPL-CCNC: 96 U/L — SIGNIFICANT CHANGE UP (ref 40–120)
ALP SERPL-CCNC: 97 U/L — SIGNIFICANT CHANGE UP (ref 40–120)
ALP SERPL-CCNC: 98 U/L — SIGNIFICANT CHANGE UP (ref 40–120)
ALP SERPL-CCNC: 99 U/L — SIGNIFICANT CHANGE UP (ref 40–120)
ALT FLD-CCNC: 20 U/L — SIGNIFICANT CHANGE UP (ref 12–78)
ALT FLD-CCNC: 21 U/L — SIGNIFICANT CHANGE UP (ref 12–78)
ALT FLD-CCNC: 21 U/L — SIGNIFICANT CHANGE UP (ref 12–78)
ALT FLD-CCNC: 22 U/L — SIGNIFICANT CHANGE UP (ref 12–78)
ALT FLD-CCNC: 23 U/L — SIGNIFICANT CHANGE UP (ref 12–78)
ALT FLD-CCNC: 24 U/L — SIGNIFICANT CHANGE UP (ref 12–78)
ALT FLD-CCNC: 24 U/L — SIGNIFICANT CHANGE UP (ref 12–78)
ALT FLD-CCNC: 30 U/L — SIGNIFICANT CHANGE UP (ref 12–78)
ALT FLD-CCNC: 31 U/L — SIGNIFICANT CHANGE UP (ref 12–78)
ALT FLD-CCNC: 37 U/L — SIGNIFICANT CHANGE UP (ref 12–78)
ALT FLD-CCNC: 43 U/L — SIGNIFICANT CHANGE UP (ref 12–78)
ALT FLD-CCNC: 45 U/L — SIGNIFICANT CHANGE UP (ref 12–78)
ALT FLD-CCNC: 49 U/L — SIGNIFICANT CHANGE UP (ref 12–78)
ALT FLD-CCNC: 59 U/L — SIGNIFICANT CHANGE UP (ref 12–78)
ALT FLD-CCNC: 65 U/L — SIGNIFICANT CHANGE UP (ref 12–78)
ALT FLD-CCNC: 90 U/L — HIGH (ref 12–78)
ALT SERPL-CCNC: 27 U/L
ANION GAP SERPL CALC-SCNC: -2 MMOL/L — LOW (ref 5–17)
ANION GAP SERPL CALC-SCNC: -2 MMOL/L — LOW (ref 5–17)
ANION GAP SERPL CALC-SCNC: -4 MMOL/L — LOW (ref 5–17)
ANION GAP SERPL CALC-SCNC: 0 MMOL/L — LOW (ref 5–17)
ANION GAP SERPL CALC-SCNC: 0 MMOL/L — LOW (ref 5–17)
ANION GAP SERPL CALC-SCNC: 15 MMOL/L
ANION GAP SERPL CALC-SCNC: 2 MMOL/L — LOW (ref 5–17)
ANION GAP SERPL CALC-SCNC: 2 MMOL/L — LOW (ref 5–17)
ANION GAP SERPL CALC-SCNC: 3 MMOL/L — LOW (ref 5–17)
ANION GAP SERPL CALC-SCNC: 4 MMOL/L — LOW (ref 5–17)
ANION GAP SERPL CALC-SCNC: 5 MMOL/L — SIGNIFICANT CHANGE UP (ref 5–17)
ANION GAP SERPL CALC-SCNC: 6 MMOL/L — SIGNIFICANT CHANGE UP (ref 5–17)
ANION GAP SERPL CALC-SCNC: 7 MMOL/L — SIGNIFICANT CHANGE UP (ref 5–17)
ANION GAP SERPL CALC-SCNC: 8 MMOL/L — SIGNIFICANT CHANGE UP (ref 5–17)
ANION GAP SERPL CALC-SCNC: 8 MMOL/L — SIGNIFICANT CHANGE UP (ref 5–17)
ANION GAP SERPL CALC-SCNC: 9 MMOL/L — SIGNIFICANT CHANGE UP (ref 5–17)
ANION GAP SERPL CALC-SCNC: 9 MMOL/L — SIGNIFICANT CHANGE UP (ref 5–17)
APPEARANCE UR: CLEAR — SIGNIFICANT CHANGE UP
APPEARANCE UR: CLEAR — SIGNIFICANT CHANGE UP
APTT BLD: 125.2 SEC — CRITICAL HIGH (ref 24.5–35.6)
APTT BLD: 26.4 SEC — SIGNIFICANT CHANGE UP (ref 24.5–35.6)
APTT BLD: 29.7 SEC — SIGNIFICANT CHANGE UP (ref 24.5–35.6)
APTT BLD: 31 SEC — SIGNIFICANT CHANGE UP (ref 24.5–35.6)
APTT BLD: 32.1 SEC
APTT BLD: 34 SEC — SIGNIFICANT CHANGE UP (ref 24.5–35.6)
APTT BLD: 39.1 SEC — HIGH (ref 24.5–35.6)
APTT BLD: 43.8 SEC — HIGH (ref 24.5–35.6)
APTT BLD: 71.7 SEC — HIGH (ref 24.5–35.6)
APTT BLD: > 200 SEC (ref 24.5–35.6)
APTT BLD: > 200 SEC (ref 24.5–35.6)
AST SERPL-CCNC: 18 U/L — SIGNIFICANT CHANGE UP (ref 15–37)
AST SERPL-CCNC: 19 U/L — SIGNIFICANT CHANGE UP (ref 15–37)
AST SERPL-CCNC: 19 U/L — SIGNIFICANT CHANGE UP (ref 15–37)
AST SERPL-CCNC: 22 U/L
AST SERPL-CCNC: 22 U/L — SIGNIFICANT CHANGE UP (ref 15–37)
AST SERPL-CCNC: 23 U/L — SIGNIFICANT CHANGE UP (ref 15–37)
AST SERPL-CCNC: 24 U/L — SIGNIFICANT CHANGE UP (ref 15–37)
AST SERPL-CCNC: 25 U/L — SIGNIFICANT CHANGE UP (ref 15–37)
AST SERPL-CCNC: 28 U/L — SIGNIFICANT CHANGE UP (ref 15–37)
AST SERPL-CCNC: 35 U/L — SIGNIFICANT CHANGE UP (ref 15–37)
AST SERPL-CCNC: 40 U/L — HIGH (ref 15–37)
AST SERPL-CCNC: 41 U/L — HIGH (ref 15–37)
AST SERPL-CCNC: 48 U/L — HIGH (ref 15–37)
AST SERPL-CCNC: 57 U/L — HIGH (ref 15–37)
AST SERPL-CCNC: 72 U/L — HIGH (ref 15–37)
BACTERIA # UR AUTO: ABNORMAL /HPF
BASE EXCESS BLDA CALC-SCNC: 1.8 MMOL/L — SIGNIFICANT CHANGE UP (ref -2–3)
BASE EXCESS BLDA CALC-SCNC: 13.8 MMOL/L — HIGH (ref -2–3)
BASE EXCESS BLDA CALC-SCNC: 15.6 MMOL/L — HIGH (ref -2–3)
BASE EXCESS BLDA CALC-SCNC: 16.9 MMOL/L — HIGH (ref -2–3)
BASE EXCESS BLDA CALC-SCNC: 19.9 MMOL/L — HIGH (ref -2–3)
BASE EXCESS BLDA CALC-SCNC: 5.6 MMOL/L — HIGH (ref -2–3)
BASE EXCESS BLDV CALC-SCNC: 4.4 MMOL/L — HIGH (ref -2–3)
BASOPHILS # BLD AUTO: 0.02 K/UL — SIGNIFICANT CHANGE UP (ref 0–0.2)
BASOPHILS # BLD AUTO: 0.03 K/UL — SIGNIFICANT CHANGE UP (ref 0–0.2)
BASOPHILS # BLD AUTO: 0.04 K/UL — SIGNIFICANT CHANGE UP (ref 0–0.2)
BASOPHILS # BLD AUTO: 0.04 K/UL — SIGNIFICANT CHANGE UP (ref 0–0.2)
BASOPHILS # BLD AUTO: 0.18 K/UL — SIGNIFICANT CHANGE UP (ref 0–0.2)
BASOPHILS NFR BLD AUTO: 0.1 % — SIGNIFICANT CHANGE UP (ref 0–2)
BASOPHILS NFR BLD AUTO: 0.2 % — SIGNIFICANT CHANGE UP (ref 0–2)
BASOPHILS NFR BLD AUTO: 0.3 % — SIGNIFICANT CHANGE UP (ref 0–2)
BASOPHILS NFR BLD AUTO: 0.4 % — SIGNIFICANT CHANGE UP (ref 0–2)
BASOPHILS NFR BLD AUTO: 0.5 % — SIGNIFICANT CHANGE UP (ref 0–2)
BASOPHILS NFR BLD AUTO: 0.5 % — SIGNIFICANT CHANGE UP (ref 0–2)
BASOPHILS NFR BLD AUTO: 0.6 % — SIGNIFICANT CHANGE UP (ref 0–2)
BASOPHILS NFR BLD AUTO: 1 % — SIGNIFICANT CHANGE UP (ref 0–2)
BILIRUB SERPL-MCNC: 0.3 MG/DL — SIGNIFICANT CHANGE UP (ref 0.2–1.2)
BILIRUB SERPL-MCNC: 0.4 MG/DL
BILIRUB SERPL-MCNC: 0.4 MG/DL — SIGNIFICANT CHANGE UP (ref 0.2–1.2)
BILIRUB SERPL-MCNC: 0.5 MG/DL — SIGNIFICANT CHANGE UP (ref 0.2–1.2)
BILIRUB SERPL-MCNC: 0.6 MG/DL — SIGNIFICANT CHANGE UP (ref 0.2–1.2)
BILIRUB SERPL-MCNC: 0.7 MG/DL — SIGNIFICANT CHANGE UP (ref 0.2–1.2)
BILIRUB SERPL-MCNC: 0.7 MG/DL — SIGNIFICANT CHANGE UP (ref 0.2–1.2)
BILIRUB SERPL-MCNC: 0.8 MG/DL — SIGNIFICANT CHANGE UP (ref 0.2–1.2)
BILIRUB UR-MCNC: NEGATIVE — SIGNIFICANT CHANGE UP
BILIRUB UR-MCNC: NEGATIVE — SIGNIFICANT CHANGE UP
BLOOD GAS COMMENTS ARTERIAL: SIGNIFICANT CHANGE UP
BLOOD GAS COMMENTS, VENOUS: SIGNIFICANT CHANGE UP
BUN SERPL-MCNC: 10 MG/DL — SIGNIFICANT CHANGE UP (ref 7–23)
BUN SERPL-MCNC: 12 MG/DL — SIGNIFICANT CHANGE UP (ref 7–23)
BUN SERPL-MCNC: 12 MG/DL — SIGNIFICANT CHANGE UP (ref 7–23)
BUN SERPL-MCNC: 15 MG/DL — SIGNIFICANT CHANGE UP (ref 7–23)
BUN SERPL-MCNC: 15 MG/DL — SIGNIFICANT CHANGE UP (ref 7–23)
BUN SERPL-MCNC: 16 MG/DL — SIGNIFICANT CHANGE UP (ref 7–23)
BUN SERPL-MCNC: 17 MG/DL — SIGNIFICANT CHANGE UP (ref 7–23)
BUN SERPL-MCNC: 18 MG/DL — SIGNIFICANT CHANGE UP (ref 7–23)
BUN SERPL-MCNC: 19 MG/DL — SIGNIFICANT CHANGE UP (ref 7–23)
BUN SERPL-MCNC: 21 MG/DL — SIGNIFICANT CHANGE UP (ref 7–23)
BUN SERPL-MCNC: 21 MG/DL — SIGNIFICANT CHANGE UP (ref 7–23)
BUN SERPL-MCNC: 23 MG/DL
BUN SERPL-MCNC: 23 MG/DL — SIGNIFICANT CHANGE UP (ref 7–23)
BUN SERPL-MCNC: 23 MG/DL — SIGNIFICANT CHANGE UP (ref 7–23)
BUN SERPL-MCNC: 25 MG/DL — HIGH (ref 7–23)
BUN SERPL-MCNC: 27 MG/DL — HIGH (ref 7–23)
BUN SERPL-MCNC: 29 MG/DL — HIGH (ref 7–23)
BUN SERPL-MCNC: 48 MG/DL — HIGH (ref 7–23)
BUN SERPL-MCNC: 49 MG/DL — HIGH (ref 7–23)
BUN SERPL-MCNC: 50 MG/DL — HIGH (ref 7–23)
BUN SERPL-MCNC: 54 MG/DL — HIGH (ref 7–23)
BUN SERPL-MCNC: 55 MG/DL — HIGH (ref 7–23)
CALCIUM SERPL-MCNC: 10.1 MG/DL — SIGNIFICANT CHANGE UP (ref 8.5–10.1)
CALCIUM SERPL-MCNC: 10.2 MG/DL — HIGH (ref 8.5–10.1)
CALCIUM SERPL-MCNC: 8.6 MG/DL — SIGNIFICANT CHANGE UP (ref 8.5–10.1)
CALCIUM SERPL-MCNC: 8.6 MG/DL — SIGNIFICANT CHANGE UP (ref 8.5–10.1)
CALCIUM SERPL-MCNC: 8.7 MG/DL — SIGNIFICANT CHANGE UP (ref 8.5–10.1)
CALCIUM SERPL-MCNC: 8.8 MG/DL — SIGNIFICANT CHANGE UP (ref 8.5–10.1)
CALCIUM SERPL-MCNC: 9 MG/DL — SIGNIFICANT CHANGE UP (ref 8.5–10.1)
CALCIUM SERPL-MCNC: 9.1 MG/DL — SIGNIFICANT CHANGE UP (ref 8.5–10.1)
CALCIUM SERPL-MCNC: 9.2 MG/DL — SIGNIFICANT CHANGE UP (ref 8.5–10.1)
CALCIUM SERPL-MCNC: 9.3 MG/DL — SIGNIFICANT CHANGE UP (ref 8.5–10.1)
CALCIUM SERPL-MCNC: 9.4 MG/DL — SIGNIFICANT CHANGE UP (ref 8.5–10.1)
CALCIUM SERPL-MCNC: 9.5 MG/DL — SIGNIFICANT CHANGE UP (ref 8.5–10.1)
CALCIUM SERPL-MCNC: 9.6 MG/DL — SIGNIFICANT CHANGE UP (ref 8.5–10.1)
CALCIUM SERPL-MCNC: 9.7 MG/DL
CALCIUM SERPL-MCNC: 9.7 MG/DL — SIGNIFICANT CHANGE UP (ref 8.5–10.1)
CALCIUM SERPL-MCNC: 9.8 MG/DL — SIGNIFICANT CHANGE UP (ref 8.5–10.1)
CALCIUM SERPL-MCNC: 9.8 MG/DL — SIGNIFICANT CHANGE UP (ref 8.5–10.1)
CALCIUM SERPL-MCNC: 9.9 MG/DL — SIGNIFICANT CHANGE UP (ref 8.5–10.1)
CALCIUM SERPL-MCNC: 9.9 MG/DL — SIGNIFICANT CHANGE UP (ref 8.5–10.1)
CHLORIDE SERPL-SCNC: 101 MMOL/L
CHLORIDE SERPL-SCNC: 103 MMOL/L — SIGNIFICANT CHANGE UP (ref 96–108)
CHLORIDE SERPL-SCNC: 103 MMOL/L — SIGNIFICANT CHANGE UP (ref 96–108)
CHLORIDE SERPL-SCNC: 104 MMOL/L — SIGNIFICANT CHANGE UP (ref 96–108)
CHLORIDE SERPL-SCNC: 105 MMOL/L — SIGNIFICANT CHANGE UP (ref 96–108)
CHLORIDE SERPL-SCNC: 105 MMOL/L — SIGNIFICANT CHANGE UP (ref 96–108)
CHLORIDE SERPL-SCNC: 106 MMOL/L — SIGNIFICANT CHANGE UP (ref 96–108)
CHLORIDE SERPL-SCNC: 107 MMOL/L — SIGNIFICANT CHANGE UP (ref 96–108)
CHLORIDE SERPL-SCNC: 108 MMOL/L — SIGNIFICANT CHANGE UP (ref 96–108)
CHLORIDE SERPL-SCNC: 109 MMOL/L — HIGH (ref 96–108)
CHLORIDE SERPL-SCNC: 109 MMOL/L — HIGH (ref 96–108)
CHLORIDE SERPL-SCNC: 110 MMOL/L — HIGH (ref 96–108)
CHLORIDE SERPL-SCNC: 110 MMOL/L — HIGH (ref 96–108)
CHLORIDE SERPL-SCNC: 111 MMOL/L — HIGH (ref 96–108)
CHLORIDE SERPL-SCNC: 111 MMOL/L — HIGH (ref 96–108)
CHLORIDE SERPL-SCNC: 113 MMOL/L — HIGH (ref 96–108)
CHLORIDE SERPL-SCNC: 116 MMOL/L — HIGH (ref 96–108)
CHOLEST SERPL-MCNC: 124 MG/DL — SIGNIFICANT CHANGE UP
CK MB BLD-MCNC: 5.5 % — HIGH (ref 0–3.5)
CK MB BLD-MCNC: 6.2 % — HIGH (ref 0–3.5)
CK MB CFR SERPL CALC: 1.7 NG/ML — SIGNIFICANT CHANGE UP (ref 0–3.6)
CK MB CFR SERPL CALC: 3.5 NG/ML — SIGNIFICANT CHANGE UP (ref 0–3.6)
CK MB CFR SERPL CALC: 3.9 NG/ML — HIGH (ref 0–3.6)
CK SERPL-CCNC: 63 U/L — SIGNIFICANT CHANGE UP (ref 26–308)
CK SERPL-CCNC: 64 U/L — SIGNIFICANT CHANGE UP (ref 26–308)
CO2 SERPL-SCNC: 25 MMOL/L — SIGNIFICANT CHANGE UP (ref 22–31)
CO2 SERPL-SCNC: 26 MMOL/L
CO2 SERPL-SCNC: 26 MMOL/L — SIGNIFICANT CHANGE UP (ref 22–31)
CO2 SERPL-SCNC: 27 MMOL/L — SIGNIFICANT CHANGE UP (ref 22–31)
CO2 SERPL-SCNC: 28 MMOL/L — SIGNIFICANT CHANGE UP (ref 22–31)
CO2 SERPL-SCNC: 28 MMOL/L — SIGNIFICANT CHANGE UP (ref 22–31)
CO2 SERPL-SCNC: 29 MMOL/L — SIGNIFICANT CHANGE UP (ref 22–31)
CO2 SERPL-SCNC: 30 MMOL/L — SIGNIFICANT CHANGE UP (ref 22–31)
CO2 SERPL-SCNC: 31 MMOL/L — SIGNIFICANT CHANGE UP (ref 22–31)
CO2 SERPL-SCNC: 32 MMOL/L — HIGH (ref 22–31)
CO2 SERPL-SCNC: 33 MMOL/L — HIGH (ref 22–31)
CO2 SERPL-SCNC: 33 MMOL/L — HIGH (ref 22–31)
CO2 SERPL-SCNC: 34 MMOL/L — HIGH (ref 22–31)
CO2 SERPL-SCNC: 37 MMOL/L — HIGH (ref 22–31)
CO2 SERPL-SCNC: 41 MMOL/L — HIGH (ref 22–31)
CO2 SERPL-SCNC: 42 MMOL/L — HIGH (ref 22–31)
CO2 SERPL-SCNC: 42 MMOL/L — HIGH (ref 22–31)
CO2 SERPL-SCNC: 43 MMOL/L — HIGH (ref 22–31)
COLOR SPEC: YELLOW — SIGNIFICANT CHANGE UP
COLOR SPEC: YELLOW — SIGNIFICANT CHANGE UP
CREAT SERPL-MCNC: 1.3 MG/DL — SIGNIFICANT CHANGE UP (ref 0.5–1.3)
CREAT SERPL-MCNC: 1.4 MG/DL — HIGH (ref 0.5–1.3)
CREAT SERPL-MCNC: 1.48 MG/DL
CREAT SERPL-MCNC: 1.5 MG/DL — HIGH (ref 0.5–1.3)
CREAT SERPL-MCNC: 1.6 MG/DL — HIGH (ref 0.5–1.3)
CREAT SERPL-MCNC: 1.7 MG/DL — HIGH (ref 0.5–1.3)
CREAT SERPL-MCNC: 1.7 MG/DL — HIGH (ref 0.5–1.3)
CREAT SERPL-MCNC: 1.8 MG/DL — HIGH (ref 0.5–1.3)
CREAT SERPL-MCNC: 2 MG/DL — HIGH (ref 0.5–1.3)
CULTURE RESULTS: ABNORMAL
CULTURE RESULTS: NO GROWTH — SIGNIFICANT CHANGE UP
CULTURE RESULTS: SIGNIFICANT CHANGE UP
DIFF PNL FLD: NEGATIVE — SIGNIFICANT CHANGE UP
DIFF PNL FLD: NEGATIVE — SIGNIFICANT CHANGE UP
EGFR: 32 ML/MIN/1.73M2 — LOW
EGFR: 36 ML/MIN/1.73M2 — LOW
EGFR: 36 ML/MIN/1.73M2 — LOW
EGFR: 37 ML/MIN/1.73M2 — LOW
EGFR: 37 ML/MIN/1.73M2 — LOW
EGFR: 39 ML/MIN/1.73M2 — LOW
EGFR: 39 ML/MIN/1.73M2 — LOW
EGFR: 42 ML/MIN/1.73M2 — LOW
EGFR: 45 ML/MIN/1.73M2 — LOW
EGFR: 46 ML/MIN/1.73M2
EGFR: 46 ML/MIN/1.73M2 — LOW
EGFR: 50 ML/MIN/1.73M2 — LOW
EGFR: 54 ML/MIN/1.73M2 — LOW
EOSINOPHIL # BLD AUTO: 0 K/UL — SIGNIFICANT CHANGE UP (ref 0–0.5)
EOSINOPHIL # BLD AUTO: 0 K/UL — SIGNIFICANT CHANGE UP (ref 0–0.5)
EOSINOPHIL # BLD AUTO: 0.01 K/UL — SIGNIFICANT CHANGE UP (ref 0–0.5)
EOSINOPHIL # BLD AUTO: 0.03 K/UL — SIGNIFICANT CHANGE UP (ref 0–0.5)
EOSINOPHIL # BLD AUTO: 0.04 K/UL — SIGNIFICANT CHANGE UP (ref 0–0.5)
EOSINOPHIL # BLD AUTO: 0.04 K/UL — SIGNIFICANT CHANGE UP (ref 0–0.5)
EOSINOPHIL # BLD AUTO: 0.05 K/UL — SIGNIFICANT CHANGE UP (ref 0–0.5)
EOSINOPHIL # BLD AUTO: 0.06 K/UL — SIGNIFICANT CHANGE UP (ref 0–0.5)
EOSINOPHIL # BLD AUTO: 0.08 K/UL — SIGNIFICANT CHANGE UP (ref 0–0.5)
EOSINOPHIL NFR BLD AUTO: 0 % — SIGNIFICANT CHANGE UP (ref 0–6)
EOSINOPHIL NFR BLD AUTO: 0 % — SIGNIFICANT CHANGE UP (ref 0–6)
EOSINOPHIL NFR BLD AUTO: 0.1 % — SIGNIFICANT CHANGE UP (ref 0–6)
EOSINOPHIL NFR BLD AUTO: 0.3 % — SIGNIFICANT CHANGE UP (ref 0–6)
EOSINOPHIL NFR BLD AUTO: 0.5 % — SIGNIFICANT CHANGE UP (ref 0–6)
EOSINOPHIL NFR BLD AUTO: 0.6 % — SIGNIFICANT CHANGE UP (ref 0–6)
EOSINOPHIL NFR BLD AUTO: 0.8 % — SIGNIFICANT CHANGE UP (ref 0–6)
EOSINOPHIL NFR BLD AUTO: 0.9 % — SIGNIFICANT CHANGE UP (ref 0–6)
EOSINOPHIL NFR BLD AUTO: 1.3 % — SIGNIFICANT CHANGE UP (ref 0–6)
EPI CELLS # UR: PRESENT
ESTIMATED AVERAGE GLUCOSE: 148 MG/DL — HIGH (ref 68–114)
ESTIMATED AVERAGE GLUCOSE: 166 MG/DL — HIGH (ref 68–114)
FLUAV AG NPH QL: SIGNIFICANT CHANGE UP
FLUBV AG NPH QL: SIGNIFICANT CHANGE UP
GAS PNL BLDA: SIGNIFICANT CHANGE UP
GAS PNL BLDV: SIGNIFICANT CHANGE UP
GLUCOSE BLDC GLUCOMTR-MCNC: 145 MG/DL — HIGH (ref 70–99)
GLUCOSE BLDC GLUCOMTR-MCNC: 149 MG/DL — HIGH (ref 70–99)
GLUCOSE BLDC GLUCOMTR-MCNC: 153 MG/DL — HIGH (ref 70–99)
GLUCOSE BLDC GLUCOMTR-MCNC: 158 MG/DL — HIGH (ref 70–99)
GLUCOSE BLDC GLUCOMTR-MCNC: 162 MG/DL — HIGH (ref 70–99)
GLUCOSE BLDC GLUCOMTR-MCNC: 189 MG/DL — HIGH (ref 70–99)
GLUCOSE BLDC GLUCOMTR-MCNC: 210 MG/DL — HIGH (ref 70–99)
GLUCOSE BLDC GLUCOMTR-MCNC: 237 MG/DL — HIGH (ref 70–99)
GLUCOSE BLDC GLUCOMTR-MCNC: 238 MG/DL — HIGH (ref 70–99)
GLUCOSE BLDC GLUCOMTR-MCNC: 299 MG/DL — HIGH (ref 70–99)
GLUCOSE BLDC GLUCOMTR-MCNC: 304 MG/DL — HIGH (ref 70–99)
GLUCOSE BLDC GLUCOMTR-MCNC: 316 MG/DL — HIGH (ref 70–99)
GLUCOSE BLDC GLUCOMTR-MCNC: 345 MG/DL — HIGH (ref 70–99)
GLUCOSE BLDC GLUCOMTR-MCNC: 391 MG/DL — HIGH (ref 70–99)
GLUCOSE SERPL-MCNC: 134 MG/DL — HIGH (ref 70–99)
GLUCOSE SERPL-MCNC: 140 MG/DL — HIGH (ref 70–99)
GLUCOSE SERPL-MCNC: 142 MG/DL — HIGH (ref 70–99)
GLUCOSE SERPL-MCNC: 151 MG/DL — HIGH (ref 70–99)
GLUCOSE SERPL-MCNC: 164 MG/DL
GLUCOSE SERPL-MCNC: 165 MG/DL — HIGH (ref 70–99)
GLUCOSE SERPL-MCNC: 169 MG/DL — HIGH (ref 70–99)
GLUCOSE SERPL-MCNC: 170 MG/DL — HIGH (ref 70–99)
GLUCOSE SERPL-MCNC: 174 MG/DL — HIGH (ref 70–99)
GLUCOSE SERPL-MCNC: 177 MG/DL — HIGH (ref 70–99)
GLUCOSE SERPL-MCNC: 185 MG/DL — HIGH (ref 70–99)
GLUCOSE SERPL-MCNC: 188 MG/DL — HIGH (ref 70–99)
GLUCOSE SERPL-MCNC: 191 MG/DL — HIGH (ref 70–99)
GLUCOSE SERPL-MCNC: 196 MG/DL — HIGH (ref 70–99)
GLUCOSE SERPL-MCNC: 202 MG/DL — HIGH (ref 70–99)
GLUCOSE SERPL-MCNC: 218 MG/DL — HIGH (ref 70–99)
GLUCOSE SERPL-MCNC: 223 MG/DL — HIGH (ref 70–99)
GLUCOSE SERPL-MCNC: 225 MG/DL — HIGH (ref 70–99)
GLUCOSE SERPL-MCNC: 233 MG/DL — HIGH (ref 70–99)
GLUCOSE SERPL-MCNC: 236 MG/DL — HIGH (ref 70–99)
GLUCOSE SERPL-MCNC: 267 MG/DL — HIGH (ref 70–99)
GLUCOSE SERPL-MCNC: 282 MG/DL — HIGH (ref 70–99)
GLUCOSE SERPL-MCNC: 304 MG/DL — HIGH (ref 70–99)
GLUCOSE SERPL-MCNC: 356 MG/DL — HIGH (ref 70–99)
GLUCOSE SERPL-MCNC: 356 MG/DL — HIGH (ref 70–99)
GLUCOSE SERPL-MCNC: 358 MG/DL — HIGH (ref 70–99)
GLUCOSE SERPL-MCNC: 414 MG/DL — HIGH (ref 70–99)
GLUCOSE UR QL: 100 MG/DL
GLUCOSE UR QL: >=1000 MG/DL
HCO3 BLDA-SCNC: 27 MMOL/L — SIGNIFICANT CHANGE UP (ref 21–28)
HCO3 BLDA-SCNC: 30 MMOL/L — HIGH (ref 21–28)
HCO3 BLDA-SCNC: 42 MMOL/L — HIGH (ref 21–28)
HCO3 BLDA-SCNC: 44 MMOL/L — HIGH (ref 21–28)
HCO3 BLDV-SCNC: 31 MMOL/L — HIGH (ref 22–29)
HCT VFR BLD CALC: 29.4 % — LOW (ref 39–50)
HCT VFR BLD CALC: 30 % — LOW (ref 39–50)
HCT VFR BLD CALC: 32.5 % — LOW (ref 39–50)
HCT VFR BLD CALC: 34.6 % — LOW (ref 39–50)
HCT VFR BLD CALC: 37.5 % — LOW (ref 39–50)
HCT VFR BLD CALC: 38.5 % — LOW (ref 39–50)
HCT VFR BLD CALC: 38.6 % — LOW (ref 39–50)
HCT VFR BLD CALC: 38.8 % — LOW (ref 39–50)
HCT VFR BLD CALC: 38.9 % — LOW (ref 39–50)
HCT VFR BLD CALC: 39 % — SIGNIFICANT CHANGE UP (ref 39–50)
HCT VFR BLD CALC: 39.3 % — SIGNIFICANT CHANGE UP (ref 39–50)
HCT VFR BLD CALC: 39.4 % — SIGNIFICANT CHANGE UP (ref 39–50)
HCT VFR BLD CALC: 39.4 % — SIGNIFICANT CHANGE UP (ref 39–50)
HCT VFR BLD CALC: 39.5 % — SIGNIFICANT CHANGE UP (ref 39–50)
HCT VFR BLD CALC: 40.1 % — SIGNIFICANT CHANGE UP (ref 39–50)
HCT VFR BLD CALC: 40.2 % — SIGNIFICANT CHANGE UP (ref 39–50)
HCT VFR BLD CALC: 40.5 % — SIGNIFICANT CHANGE UP (ref 39–50)
HCT VFR BLD CALC: 40.6 % — SIGNIFICANT CHANGE UP (ref 39–50)
HCT VFR BLD CALC: 40.9 % — SIGNIFICANT CHANGE UP (ref 39–50)
HCT VFR BLD CALC: 41.5 % — SIGNIFICANT CHANGE UP (ref 39–50)
HCT VFR BLD CALC: 41.8 % — SIGNIFICANT CHANGE UP (ref 39–50)
HCT VFR BLD CALC: 42 % — SIGNIFICANT CHANGE UP (ref 39–50)
HCT VFR BLD CALC: 42.5 % — SIGNIFICANT CHANGE UP (ref 39–50)
HCT VFR BLD CALC: 42.5 % — SIGNIFICANT CHANGE UP (ref 39–50)
HCT VFR BLD CALC: 43 % — SIGNIFICANT CHANGE UP (ref 39–50)
HCT VFR BLD CALC: 43.1 % — SIGNIFICANT CHANGE UP (ref 39–50)
HCT VFR BLD CALC: 43.6 % — SIGNIFICANT CHANGE UP (ref 39–50)
HCT VFR BLD CALC: 44.3 % — SIGNIFICANT CHANGE UP (ref 39–50)
HCT VFR BLD CALC: 44.6 % — SIGNIFICANT CHANGE UP (ref 39–50)
HCT VFR BLD CALC: 45.4 % — SIGNIFICANT CHANGE UP (ref 39–50)
HCT VFR BLD CALC: 46.8 %
HDLC SERPL-MCNC: 48 MG/DL — SIGNIFICANT CHANGE UP
HGB BLD-MCNC: 10.8 G/DL — LOW (ref 13–17)
HGB BLD-MCNC: 11.2 G/DL — LOW (ref 13–17)
HGB BLD-MCNC: 11.4 G/DL — LOW (ref 13–17)
HGB BLD-MCNC: 11.6 G/DL — LOW (ref 13–17)
HGB BLD-MCNC: 11.8 G/DL — LOW (ref 13–17)
HGB BLD-MCNC: 11.9 G/DL — LOW (ref 13–17)
HGB BLD-MCNC: 12 G/DL — LOW (ref 13–17)
HGB BLD-MCNC: 12 G/DL — LOW (ref 13–17)
HGB BLD-MCNC: 12.1 G/DL — LOW (ref 13–17)
HGB BLD-MCNC: 12.1 G/DL — LOW (ref 13–17)
HGB BLD-MCNC: 12.2 G/DL — LOW (ref 13–17)
HGB BLD-MCNC: 12.3 G/DL — LOW (ref 13–17)
HGB BLD-MCNC: 12.3 G/DL — LOW (ref 13–17)
HGB BLD-MCNC: 12.6 G/DL — LOW (ref 13–17)
HGB BLD-MCNC: 12.6 G/DL — LOW (ref 13–17)
HGB BLD-MCNC: 12.7 G/DL — LOW (ref 13–17)
HGB BLD-MCNC: 13 G/DL — SIGNIFICANT CHANGE UP (ref 13–17)
HGB BLD-MCNC: 13.2 G/DL — SIGNIFICANT CHANGE UP (ref 13–17)
HGB BLD-MCNC: 13.5 G/DL — SIGNIFICANT CHANGE UP (ref 13–17)
HGB BLD-MCNC: 13.6 G/DL — SIGNIFICANT CHANGE UP (ref 13–17)
HGB BLD-MCNC: 13.8 G/DL — SIGNIFICANT CHANGE UP (ref 13–17)
HGB BLD-MCNC: 13.8 G/DL — SIGNIFICANT CHANGE UP (ref 13–17)
HGB BLD-MCNC: 13.9 G/DL — SIGNIFICANT CHANGE UP (ref 13–17)
HGB BLD-MCNC: 14.5 G/DL
HGB BLD-MCNC: 14.5 G/DL — SIGNIFICANT CHANGE UP (ref 13–17)
HGB BLD-MCNC: 8.2 G/DL — LOW (ref 13–17)
HGB BLD-MCNC: 8.7 G/DL — LOW (ref 13–17)
HGB BLD-MCNC: 9 G/DL — LOW (ref 13–17)
HGB BLD-MCNC: 9.7 G/DL — LOW (ref 13–17)
HOROWITZ INDEX BLDA+IHG-RTO: 21 — SIGNIFICANT CHANGE UP
HOROWITZ INDEX BLDA+IHG-RTO: 21 — SIGNIFICANT CHANGE UP
HOROWITZ INDEX BLDA+IHG-RTO: 30 — SIGNIFICANT CHANGE UP
IMM GRANULOCYTES NFR BLD AUTO: 0.4 % — SIGNIFICANT CHANGE UP (ref 0–0.9)
IMM GRANULOCYTES NFR BLD AUTO: 0.5 % — SIGNIFICANT CHANGE UP (ref 0–0.9)
IMM GRANULOCYTES NFR BLD AUTO: 0.6 % — SIGNIFICANT CHANGE UP (ref 0–0.9)
IMM GRANULOCYTES NFR BLD AUTO: 0.7 % — SIGNIFICANT CHANGE UP (ref 0–0.9)
IMM GRANULOCYTES NFR BLD AUTO: 0.7 % — SIGNIFICANT CHANGE UP (ref 0–0.9)
IMM GRANULOCYTES NFR BLD AUTO: 0.8 % — SIGNIFICANT CHANGE UP (ref 0–0.9)
IMM GRANULOCYTES NFR BLD AUTO: 0.9 % — SIGNIFICANT CHANGE UP (ref 0–0.9)
IMM GRANULOCYTES NFR BLD AUTO: 0.9 % — SIGNIFICANT CHANGE UP (ref 0–0.9)
IMM GRANULOCYTES NFR BLD AUTO: 1.6 % — HIGH (ref 0–0.9)
INR BLD: 1.01 RATIO — SIGNIFICANT CHANGE UP (ref 0.85–1.18)
INR BLD: 1.14 RATIO — SIGNIFICANT CHANGE UP (ref 0.85–1.18)
INR BLD: 1.18 RATIO — SIGNIFICANT CHANGE UP (ref 0.85–1.18)
INR BLD: 1.35 RATIO — HIGH (ref 0.85–1.18)
INR BLD: 1.42 RATIO — HIGH (ref 0.85–1.18)
INR PPP: 0.95 RATIO
KETONES UR-MCNC: 15 MG/DL
KETONES UR-MCNC: NEGATIVE MG/DL — SIGNIFICANT CHANGE UP
LACTATE SERPL-SCNC: 1.5 MMOL/L — SIGNIFICANT CHANGE UP (ref 0.7–2)
LACTATE SERPL-SCNC: 2.2 MMOL/L — HIGH (ref 0.7–2)
LEUKOCYTE ESTERASE UR-ACNC: NEGATIVE — SIGNIFICANT CHANGE UP
LEUKOCYTE ESTERASE UR-ACNC: NEGATIVE — SIGNIFICANT CHANGE UP
LIDOCAIN IGE QN: 39 U/L — SIGNIFICANT CHANGE UP (ref 13–75)
LIPID PNL WITH DIRECT LDL SERPL: 59 MG/DL — SIGNIFICANT CHANGE UP
LYMPHOCYTES # BLD AUTO: 0.43 K/UL — LOW (ref 1–3.3)
LYMPHOCYTES # BLD AUTO: 0.71 K/UL — LOW (ref 1–3.3)
LYMPHOCYTES # BLD AUTO: 0.72 K/UL — LOW (ref 1–3.3)
LYMPHOCYTES # BLD AUTO: 0.73 K/UL — LOW (ref 1–3.3)
LYMPHOCYTES # BLD AUTO: 0.77 K/UL — LOW (ref 1–3.3)
LYMPHOCYTES # BLD AUTO: 0.82 K/UL — LOW (ref 1–3.3)
LYMPHOCYTES # BLD AUTO: 0.83 K/UL — LOW (ref 1–3.3)
LYMPHOCYTES # BLD AUTO: 0.86 K/UL — LOW (ref 1–3.3)
LYMPHOCYTES # BLD AUTO: 0.89 K/UL — LOW (ref 1–3.3)
LYMPHOCYTES # BLD AUTO: 0.92 K/UL — LOW (ref 1–3.3)
LYMPHOCYTES # BLD AUTO: 0.99 K/UL — LOW (ref 1–3.3)
LYMPHOCYTES # BLD AUTO: 1 K/UL — SIGNIFICANT CHANGE UP (ref 1–3.3)
LYMPHOCYTES # BLD AUTO: 1.04 K/UL — SIGNIFICANT CHANGE UP (ref 1–3.3)
LYMPHOCYTES # BLD AUTO: 1.15 K/UL — SIGNIFICANT CHANGE UP (ref 1–3.3)
LYMPHOCYTES # BLD AUTO: 1.19 K/UL — SIGNIFICANT CHANGE UP (ref 1–3.3)
LYMPHOCYTES # BLD AUTO: 1.25 K/UL — SIGNIFICANT CHANGE UP (ref 1–3.3)
LYMPHOCYTES # BLD AUTO: 1.34 K/UL — SIGNIFICANT CHANGE UP (ref 1–3.3)
LYMPHOCYTES # BLD AUTO: 10.2 % — LOW (ref 13–44)
LYMPHOCYTES # BLD AUTO: 10.3 % — LOW (ref 13–44)
LYMPHOCYTES # BLD AUTO: 10.8 % — LOW (ref 13–44)
LYMPHOCYTES # BLD AUTO: 12.5 % — LOW (ref 13–44)
LYMPHOCYTES # BLD AUTO: 13.9 % — SIGNIFICANT CHANGE UP (ref 13–44)
LYMPHOCYTES # BLD AUTO: 14.9 % — SIGNIFICANT CHANGE UP (ref 13–44)
LYMPHOCYTES # BLD AUTO: 14.9 % — SIGNIFICANT CHANGE UP (ref 13–44)
LYMPHOCYTES # BLD AUTO: 17.4 % — SIGNIFICANT CHANGE UP (ref 13–44)
LYMPHOCYTES # BLD AUTO: 17.6 % — SIGNIFICANT CHANGE UP (ref 13–44)
LYMPHOCYTES # BLD AUTO: 18.4 % — SIGNIFICANT CHANGE UP (ref 13–44)
LYMPHOCYTES # BLD AUTO: 2.8 % — LOW (ref 13–44)
LYMPHOCYTES # BLD AUTO: 4 % — LOW (ref 13–44)
LYMPHOCYTES # BLD AUTO: 5.6 % — LOW (ref 13–44)
LYMPHOCYTES # BLD AUTO: 7 % — LOW (ref 13–44)
LYMPHOCYTES # BLD AUTO: 7 % — LOW (ref 13–44)
LYMPHOCYTES # BLD AUTO: 7.1 % — LOW (ref 13–44)
LYMPHOCYTES # BLD AUTO: 7.3 % — LOW (ref 13–44)
MAGNESIUM SERPL-MCNC: 1.6 MG/DL — SIGNIFICANT CHANGE UP (ref 1.6–2.6)
MAGNESIUM SERPL-MCNC: 1.7 MG/DL — SIGNIFICANT CHANGE UP (ref 1.6–2.6)
MAGNESIUM SERPL-MCNC: 1.7 MG/DL — SIGNIFICANT CHANGE UP (ref 1.6–2.6)
MAGNESIUM SERPL-MCNC: 1.8 MG/DL — SIGNIFICANT CHANGE UP (ref 1.6–2.6)
MAGNESIUM SERPL-MCNC: 1.8 MG/DL — SIGNIFICANT CHANGE UP (ref 1.6–2.6)
MAGNESIUM SERPL-MCNC: 2 MG/DL — SIGNIFICANT CHANGE UP (ref 1.6–2.6)
MAGNESIUM SERPL-MCNC: 2.1 MG/DL — SIGNIFICANT CHANGE UP (ref 1.6–2.6)
MAGNESIUM SERPL-MCNC: 2.2 MG/DL — SIGNIFICANT CHANGE UP (ref 1.6–2.6)
MAGNESIUM SERPL-MCNC: 2.3 MG/DL — SIGNIFICANT CHANGE UP (ref 1.6–2.6)
MAGNESIUM SERPL-MCNC: 2.3 MG/DL — SIGNIFICANT CHANGE UP (ref 1.6–2.6)
MAGNESIUM SERPL-MCNC: 2.7 MG/DL — HIGH (ref 1.6–2.6)
MAGNESIUM SERPL-MCNC: 2.8 MG/DL — HIGH (ref 1.6–2.6)
MAGNESIUM SERPL-MCNC: 3.1 MG/DL — HIGH (ref 1.6–2.6)
MCHC RBC-ENTMCNC: 26.4 PG — LOW (ref 27–34)
MCHC RBC-ENTMCNC: 26.5 PG — LOW (ref 27–34)
MCHC RBC-ENTMCNC: 26.7 PG — LOW (ref 27–34)
MCHC RBC-ENTMCNC: 26.8 PG — LOW (ref 27–34)
MCHC RBC-ENTMCNC: 26.9 PG — LOW (ref 27–34)
MCHC RBC-ENTMCNC: 27 PG — SIGNIFICANT CHANGE UP (ref 27–34)
MCHC RBC-ENTMCNC: 27.1 PG — SIGNIFICANT CHANGE UP (ref 27–34)
MCHC RBC-ENTMCNC: 27.2 PG — SIGNIFICANT CHANGE UP (ref 27–34)
MCHC RBC-ENTMCNC: 27.2 PG — SIGNIFICANT CHANGE UP (ref 27–34)
MCHC RBC-ENTMCNC: 27.3 PG — SIGNIFICANT CHANGE UP (ref 27–34)
MCHC RBC-ENTMCNC: 27.4 GM/DL — LOW (ref 32–36)
MCHC RBC-ENTMCNC: 27.4 PG — SIGNIFICANT CHANGE UP (ref 27–34)
MCHC RBC-ENTMCNC: 27.4 PG — SIGNIFICANT CHANGE UP (ref 27–34)
MCHC RBC-ENTMCNC: 27.6 GM/DL — LOW (ref 32–36)
MCHC RBC-ENTMCNC: 27.6 PG
MCHC RBC-ENTMCNC: 27.7 GM/DL — LOW (ref 32–36)
MCHC RBC-ENTMCNC: 27.9 GM/DL — LOW (ref 32–36)
MCHC RBC-ENTMCNC: 28 GM/DL — LOW (ref 32–36)
MCHC RBC-ENTMCNC: 28.2 GM/DL — LOW (ref 32–36)
MCHC RBC-ENTMCNC: 29 GM/DL — LOW (ref 32–36)
MCHC RBC-ENTMCNC: 29.4 GM/DL — LOW (ref 32–36)
MCHC RBC-ENTMCNC: 30.1 GM/DL — LOW (ref 32–36)
MCHC RBC-ENTMCNC: 30.2 GM/DL — LOW (ref 32–36)
MCHC RBC-ENTMCNC: 30.4 GM/DL — LOW (ref 32–36)
MCHC RBC-ENTMCNC: 30.6 GM/DL — LOW (ref 32–36)
MCHC RBC-ENTMCNC: 30.6 GM/DL — LOW (ref 32–36)
MCHC RBC-ENTMCNC: 30.7 GM/DL — LOW (ref 32–36)
MCHC RBC-ENTMCNC: 30.8 GM/DL — LOW (ref 32–36)
MCHC RBC-ENTMCNC: 30.9 GM/DL — LOW (ref 32–36)
MCHC RBC-ENTMCNC: 31 GM/DL
MCHC RBC-ENTMCNC: 31 GM/DL — LOW (ref 32–36)
MCHC RBC-ENTMCNC: 31.1 GM/DL — LOW (ref 32–36)
MCHC RBC-ENTMCNC: 31.3 GM/DL — LOW (ref 32–36)
MCHC RBC-ENTMCNC: 31.3 GM/DL — LOW (ref 32–36)
MCHC RBC-ENTMCNC: 31.4 GM/DL — LOW (ref 32–36)
MCHC RBC-ENTMCNC: 31.6 GM/DL — LOW (ref 32–36)
MCHC RBC-ENTMCNC: 31.7 GM/DL — LOW (ref 32–36)
MCHC RBC-ENTMCNC: 31.7 GM/DL — LOW (ref 32–36)
MCHC RBC-ENTMCNC: 31.8 GM/DL — LOW (ref 32–36)
MCHC RBC-ENTMCNC: 31.9 GM/DL — LOW (ref 32–36)
MCHC RBC-ENTMCNC: 32.1 GM/DL — SIGNIFICANT CHANGE UP (ref 32–36)
MCV RBC AUTO: 84 FL — SIGNIFICANT CHANGE UP (ref 80–100)
MCV RBC AUTO: 85 FL — SIGNIFICANT CHANGE UP (ref 80–100)
MCV RBC AUTO: 85.5 FL — SIGNIFICANT CHANGE UP (ref 80–100)
MCV RBC AUTO: 85.5 FL — SIGNIFICANT CHANGE UP (ref 80–100)
MCV RBC AUTO: 85.7 FL — SIGNIFICANT CHANGE UP (ref 80–100)
MCV RBC AUTO: 85.8 FL — SIGNIFICANT CHANGE UP (ref 80–100)
MCV RBC AUTO: 85.9 FL — SIGNIFICANT CHANGE UP (ref 80–100)
MCV RBC AUTO: 86.4 FL — SIGNIFICANT CHANGE UP (ref 80–100)
MCV RBC AUTO: 86.5 FL — SIGNIFICANT CHANGE UP (ref 80–100)
MCV RBC AUTO: 87.1 FL — SIGNIFICANT CHANGE UP (ref 80–100)
MCV RBC AUTO: 87.3 FL — SIGNIFICANT CHANGE UP (ref 80–100)
MCV RBC AUTO: 87.5 FL — SIGNIFICANT CHANGE UP (ref 80–100)
MCV RBC AUTO: 87.6 FL — SIGNIFICANT CHANGE UP (ref 80–100)
MCV RBC AUTO: 87.8 FL — SIGNIFICANT CHANGE UP (ref 80–100)
MCV RBC AUTO: 88.3 FL — SIGNIFICANT CHANGE UP (ref 80–100)
MCV RBC AUTO: 88.7 FL — SIGNIFICANT CHANGE UP (ref 80–100)
MCV RBC AUTO: 88.9 FL — SIGNIFICANT CHANGE UP (ref 80–100)
MCV RBC AUTO: 89 FL
MCV RBC AUTO: 89.2 FL — SIGNIFICANT CHANGE UP (ref 80–100)
MCV RBC AUTO: 90.2 FL — SIGNIFICANT CHANGE UP (ref 80–100)
MCV RBC AUTO: 93.6 FL — SIGNIFICANT CHANGE UP (ref 80–100)
MCV RBC AUTO: 94.6 FL — SIGNIFICANT CHANGE UP (ref 80–100)
MCV RBC AUTO: 96.4 FL — SIGNIFICANT CHANGE UP (ref 80–100)
MCV RBC AUTO: 96.9 FL — SIGNIFICANT CHANGE UP (ref 80–100)
MCV RBC AUTO: 97.3 FL — SIGNIFICANT CHANGE UP (ref 80–100)
MCV RBC AUTO: 97.4 FL — SIGNIFICANT CHANGE UP (ref 80–100)
MCV RBC AUTO: 97.6 FL — SIGNIFICANT CHANGE UP (ref 80–100)
MONOCYTES # BLD AUTO: 0.75 K/UL — SIGNIFICANT CHANGE UP (ref 0–0.9)
MONOCYTES # BLD AUTO: 0.82 K/UL — SIGNIFICANT CHANGE UP (ref 0–0.9)
MONOCYTES # BLD AUTO: 0.84 K/UL — SIGNIFICANT CHANGE UP (ref 0–0.9)
MONOCYTES # BLD AUTO: 0.87 K/UL — SIGNIFICANT CHANGE UP (ref 0–0.9)
MONOCYTES # BLD AUTO: 0.91 K/UL — HIGH (ref 0–0.9)
MONOCYTES # BLD AUTO: 0.96 K/UL — HIGH (ref 0–0.9)
MONOCYTES # BLD AUTO: 1 K/UL — HIGH (ref 0–0.9)
MONOCYTES # BLD AUTO: 1.05 K/UL — HIGH (ref 0–0.9)
MONOCYTES # BLD AUTO: 1.07 K/UL — HIGH (ref 0–0.9)
MONOCYTES # BLD AUTO: 1.22 K/UL — HIGH (ref 0–0.9)
MONOCYTES # BLD AUTO: 1.28 K/UL — HIGH (ref 0–0.9)
MONOCYTES # BLD AUTO: 1.3 K/UL — HIGH (ref 0–0.9)
MONOCYTES # BLD AUTO: 1.31 K/UL — HIGH (ref 0–0.9)
MONOCYTES # BLD AUTO: 1.37 K/UL — HIGH (ref 0–0.9)
MONOCYTES # BLD AUTO: 1.56 K/UL — HIGH (ref 0–0.9)
MONOCYTES # BLD AUTO: 1.61 K/UL — HIGH (ref 0–0.9)
MONOCYTES # BLD AUTO: 1.63 K/UL — HIGH (ref 0–0.9)
MONOCYTES NFR BLD AUTO: 10.7 % — SIGNIFICANT CHANGE UP (ref 2–14)
MONOCYTES NFR BLD AUTO: 11.1 % — SIGNIFICANT CHANGE UP (ref 2–14)
MONOCYTES NFR BLD AUTO: 12 % — SIGNIFICANT CHANGE UP (ref 2–14)
MONOCYTES NFR BLD AUTO: 12.1 % — SIGNIFICANT CHANGE UP (ref 2–14)
MONOCYTES NFR BLD AUTO: 12.1 % — SIGNIFICANT CHANGE UP (ref 2–14)
MONOCYTES NFR BLD AUTO: 12.6 % — SIGNIFICANT CHANGE UP (ref 2–14)
MONOCYTES NFR BLD AUTO: 12.9 % — SIGNIFICANT CHANGE UP (ref 2–14)
MONOCYTES NFR BLD AUTO: 13.8 % — SIGNIFICANT CHANGE UP (ref 2–14)
MONOCYTES NFR BLD AUTO: 13.9 % — SIGNIFICANT CHANGE UP (ref 2–14)
MONOCYTES NFR BLD AUTO: 14.7 % — HIGH (ref 2–14)
MONOCYTES NFR BLD AUTO: 15.2 % — HIGH (ref 2–14)
MONOCYTES NFR BLD AUTO: 16.3 % — HIGH (ref 2–14)
MONOCYTES NFR BLD AUTO: 16.7 % — HIGH (ref 2–14)
MONOCYTES NFR BLD AUTO: 6.8 % — SIGNIFICANT CHANGE UP (ref 2–14)
MONOCYTES NFR BLD AUTO: 8 % — SIGNIFICANT CHANGE UP (ref 2–14)
MONOCYTES NFR BLD AUTO: 9 % — SIGNIFICANT CHANGE UP (ref 2–14)
MONOCYTES NFR BLD AUTO: 9.5 % — SIGNIFICANT CHANGE UP (ref 2–14)
MRSA PCR RESULT.: SIGNIFICANT CHANGE UP
NEUTROPHILS # BLD AUTO: 13.65 K/UL — HIGH (ref 1.8–7.4)
NEUTROPHILS # BLD AUTO: 13.7 K/UL — HIGH (ref 1.8–7.4)
NEUTROPHILS # BLD AUTO: 15.2 K/UL — HIGH (ref 1.8–7.4)
NEUTROPHILS # BLD AUTO: 3.93 K/UL — SIGNIFICANT CHANGE UP (ref 1.8–7.4)
NEUTROPHILS # BLD AUTO: 4.35 K/UL — SIGNIFICANT CHANGE UP (ref 1.8–7.4)
NEUTROPHILS # BLD AUTO: 4.42 K/UL — SIGNIFICANT CHANGE UP (ref 1.8–7.4)
NEUTROPHILS # BLD AUTO: 4.77 K/UL — SIGNIFICANT CHANGE UP (ref 1.8–7.4)
NEUTROPHILS # BLD AUTO: 5.66 K/UL — SIGNIFICANT CHANGE UP (ref 1.8–7.4)
NEUTROPHILS # BLD AUTO: 5.78 K/UL — SIGNIFICANT CHANGE UP (ref 1.8–7.4)
NEUTROPHILS # BLD AUTO: 5.83 K/UL — SIGNIFICANT CHANGE UP (ref 1.8–7.4)
NEUTROPHILS # BLD AUTO: 5.96 K/UL — SIGNIFICANT CHANGE UP (ref 1.8–7.4)
NEUTROPHILS # BLD AUTO: 6.29 K/UL — SIGNIFICANT CHANGE UP (ref 1.8–7.4)
NEUTROPHILS # BLD AUTO: 6.97 K/UL — SIGNIFICANT CHANGE UP (ref 1.8–7.4)
NEUTROPHILS # BLD AUTO: 8.16 K/UL — HIGH (ref 1.8–7.4)
NEUTROPHILS # BLD AUTO: 8.24 K/UL — HIGH (ref 1.8–7.4)
NEUTROPHILS # BLD AUTO: 8.63 K/UL — HIGH (ref 1.8–7.4)
NEUTROPHILS # BLD AUTO: 9.59 K/UL — HIGH (ref 1.8–7.4)
NEUTROPHILS NFR BLD AUTO: 65.6 % — SIGNIFICANT CHANGE UP (ref 43–77)
NEUTROPHILS NFR BLD AUTO: 66.7 % — SIGNIFICANT CHANGE UP (ref 43–77)
NEUTROPHILS NFR BLD AUTO: 66.9 % — SIGNIFICANT CHANGE UP (ref 43–77)
NEUTROPHILS NFR BLD AUTO: 67.5 % — SIGNIFICANT CHANGE UP (ref 43–77)
NEUTROPHILS NFR BLD AUTO: 69.4 % — SIGNIFICANT CHANGE UP (ref 43–77)
NEUTROPHILS NFR BLD AUTO: 70.5 % — SIGNIFICANT CHANGE UP (ref 43–77)
NEUTROPHILS NFR BLD AUTO: 71.5 % — SIGNIFICANT CHANGE UP (ref 43–77)
NEUTROPHILS NFR BLD AUTO: 73.4 % — SIGNIFICANT CHANGE UP (ref 43–77)
NEUTROPHILS NFR BLD AUTO: 76.2 % — SIGNIFICANT CHANGE UP (ref 43–77)
NEUTROPHILS NFR BLD AUTO: 77.4 % — HIGH (ref 43–77)
NEUTROPHILS NFR BLD AUTO: 79.1 % — HIGH (ref 43–77)
NEUTROPHILS NFR BLD AUTO: 79.2 % — HIGH (ref 43–77)
NEUTROPHILS NFR BLD AUTO: 79.9 % — HIGH (ref 43–77)
NEUTROPHILS NFR BLD AUTO: 81.9 % — HIGH (ref 43–77)
NEUTROPHILS NFR BLD AUTO: 85 % — HIGH (ref 43–77)
NEUTROPHILS NFR BLD AUTO: 85.3 % — HIGH (ref 43–77)
NEUTROPHILS NFR BLD AUTO: 88.7 % — HIGH (ref 43–77)
NITRITE UR-MCNC: NEGATIVE — SIGNIFICANT CHANGE UP
NITRITE UR-MCNC: NEGATIVE — SIGNIFICANT CHANGE UP
NON HDL CHOLESTEROL: 77 MG/DL — SIGNIFICANT CHANGE UP
NRBC # BLD: 0 /100 WBCS — SIGNIFICANT CHANGE UP (ref 0–0)
NRBC # BLD: SIGNIFICANT CHANGE UP /100 WBCS (ref 0–0)
NT-PROBNP SERPL-SCNC: 355 PG/ML — SIGNIFICANT CHANGE UP (ref 0–450)
NT-PROBNP SERPL-SCNC: 799 PG/ML — HIGH (ref 0–450)
OSMOLALITY SERPL: 343 MOSMOL/KG — HIGH (ref 280–301)
PCO2 BLDA: 107 MMHG — CRITICAL HIGH (ref 35–48)
PCO2 BLDA: 43 MMHG — SIGNIFICANT CHANGE UP (ref 35–48)
PCO2 BLDA: 49 MMHG — HIGH (ref 35–48)
PCO2 BLDA: 65 MMHG — HIGH (ref 35–48)
PCO2 BLDA: 73 MMHG — CRITICAL HIGH (ref 35–48)
PCO2 BLDA: 81 MMHG — CRITICAL HIGH (ref 35–48)
PCO2 BLDV: 61 MMHG — HIGH (ref 42–55)
PH BLDA: 7.2 — CRITICAL LOW (ref 7.35–7.45)
PH BLDA: 7.32 — LOW (ref 7.35–7.45)
PH BLDA: 7.37 — SIGNIFICANT CHANGE UP (ref 7.35–7.45)
PH BLDA: 7.4 — SIGNIFICANT CHANGE UP (ref 7.35–7.45)
PH BLDA: 7.4 — SIGNIFICANT CHANGE UP (ref 7.35–7.45)
PH BLDA: 7.44 — SIGNIFICANT CHANGE UP (ref 7.35–7.45)
PH BLDV: 7.31 — LOW (ref 7.32–7.43)
PH UR: 6 — SIGNIFICANT CHANGE UP (ref 5–8)
PH UR: 6 — SIGNIFICANT CHANGE UP (ref 5–8)
PHOSPHATE SERPL-MCNC: 2.1 MG/DL — LOW (ref 2.5–4.5)
PHOSPHATE SERPL-MCNC: 2.2 MG/DL — LOW (ref 2.5–4.5)
PHOSPHATE SERPL-MCNC: 2.4 MG/DL — LOW (ref 2.5–4.5)
PHOSPHATE SERPL-MCNC: 2.6 MG/DL — SIGNIFICANT CHANGE UP (ref 2.5–4.5)
PHOSPHATE SERPL-MCNC: 2.8 MG/DL — SIGNIFICANT CHANGE UP (ref 2.5–4.5)
PHOSPHATE SERPL-MCNC: 2.9 MG/DL — SIGNIFICANT CHANGE UP (ref 2.5–4.5)
PHOSPHATE SERPL-MCNC: 3 MG/DL — SIGNIFICANT CHANGE UP (ref 2.5–4.5)
PHOSPHATE SERPL-MCNC: 3.2 MG/DL — SIGNIFICANT CHANGE UP (ref 2.5–4.5)
PHOSPHATE SERPL-MCNC: 3.3 MG/DL — SIGNIFICANT CHANGE UP (ref 2.5–4.5)
PHOSPHATE SERPL-MCNC: 3.4 MG/DL — SIGNIFICANT CHANGE UP (ref 2.5–4.5)
PHOSPHATE SERPL-MCNC: 3.4 MG/DL — SIGNIFICANT CHANGE UP (ref 2.5–4.5)
PLATELET # BLD AUTO: 156 K/UL — SIGNIFICANT CHANGE UP (ref 150–400)
PLATELET # BLD AUTO: 178 K/UL — SIGNIFICANT CHANGE UP (ref 150–400)
PLATELET # BLD AUTO: 180 K/UL — SIGNIFICANT CHANGE UP (ref 150–400)
PLATELET # BLD AUTO: 186 K/UL — SIGNIFICANT CHANGE UP (ref 150–400)
PLATELET # BLD AUTO: 187 K/UL
PLATELET # BLD AUTO: 190 K/UL — SIGNIFICANT CHANGE UP (ref 150–400)
PLATELET # BLD AUTO: 195 K/UL — SIGNIFICANT CHANGE UP (ref 150–400)
PLATELET # BLD AUTO: 196 K/UL — SIGNIFICANT CHANGE UP (ref 150–400)
PLATELET # BLD AUTO: 207 K/UL — SIGNIFICANT CHANGE UP (ref 150–400)
PLATELET # BLD AUTO: 210 K/UL — SIGNIFICANT CHANGE UP (ref 150–400)
PLATELET # BLD AUTO: 211 K/UL — SIGNIFICANT CHANGE UP (ref 150–400)
PLATELET # BLD AUTO: 212 K/UL — SIGNIFICANT CHANGE UP (ref 150–400)
PLATELET # BLD AUTO: 216 K/UL — SIGNIFICANT CHANGE UP (ref 150–400)
PLATELET # BLD AUTO: 216 K/UL — SIGNIFICANT CHANGE UP (ref 150–400)
PLATELET # BLD AUTO: 223 K/UL — SIGNIFICANT CHANGE UP (ref 150–400)
PLATELET # BLD AUTO: 223 K/UL — SIGNIFICANT CHANGE UP (ref 150–400)
PLATELET # BLD AUTO: 225 K/UL — SIGNIFICANT CHANGE UP (ref 150–400)
PLATELET # BLD AUTO: 227 K/UL — SIGNIFICANT CHANGE UP (ref 150–400)
PLATELET # BLD AUTO: 242 K/UL — SIGNIFICANT CHANGE UP (ref 150–400)
PLATELET # BLD AUTO: 266 K/UL — SIGNIFICANT CHANGE UP (ref 150–400)
PLATELET # BLD AUTO: 274 K/UL — SIGNIFICANT CHANGE UP (ref 150–400)
PLATELET # BLD AUTO: 295 K/UL — SIGNIFICANT CHANGE UP (ref 150–400)
PLATELET # BLD AUTO: 302 K/UL — SIGNIFICANT CHANGE UP (ref 150–400)
PLATELET # BLD AUTO: 311 K/UL — SIGNIFICANT CHANGE UP (ref 150–400)
PLATELET # BLD AUTO: 314 K/UL — SIGNIFICANT CHANGE UP (ref 150–400)
PLATELET # BLD AUTO: 318 K/UL — SIGNIFICANT CHANGE UP (ref 150–400)
PLATELET # BLD AUTO: 330 K/UL — SIGNIFICANT CHANGE UP (ref 150–400)
PLATELET # BLD AUTO: 333 K/UL — SIGNIFICANT CHANGE UP (ref 150–400)
PLATELET # BLD AUTO: 338 K/UL — SIGNIFICANT CHANGE UP (ref 150–400)
PLATELET # BLD AUTO: 371 K/UL — SIGNIFICANT CHANGE UP (ref 150–400)
PLATELET # BLD AUTO: 408 K/UL — HIGH (ref 150–400)
PO2 BLDA: 123 MMHG — HIGH (ref 83–108)
PO2 BLDA: 153 MMHG — HIGH (ref 83–108)
PO2 BLDA: 69 MMHG — LOW (ref 83–108)
PO2 BLDA: 75 MMHG — LOW (ref 83–108)
PO2 BLDA: 81 MMHG — LOW (ref 83–108)
PO2 BLDA: 89 MMHG — SIGNIFICANT CHANGE UP (ref 83–108)
PO2 BLDV: 73 MMHG — HIGH (ref 25–45)
POTASSIUM SERPL-MCNC: 3.1 MMOL/L — LOW (ref 3.5–5.3)
POTASSIUM SERPL-MCNC: 3.4 MMOL/L — LOW (ref 3.5–5.3)
POTASSIUM SERPL-MCNC: 3.5 MMOL/L — SIGNIFICANT CHANGE UP (ref 3.5–5.3)
POTASSIUM SERPL-MCNC: 3.5 MMOL/L — SIGNIFICANT CHANGE UP (ref 3.5–5.3)
POTASSIUM SERPL-MCNC: 3.6 MMOL/L — SIGNIFICANT CHANGE UP (ref 3.5–5.3)
POTASSIUM SERPL-MCNC: 3.7 MMOL/L — SIGNIFICANT CHANGE UP (ref 3.5–5.3)
POTASSIUM SERPL-MCNC: 3.8 MMOL/L — SIGNIFICANT CHANGE UP (ref 3.5–5.3)
POTASSIUM SERPL-MCNC: 3.9 MMOL/L — SIGNIFICANT CHANGE UP (ref 3.5–5.3)
POTASSIUM SERPL-MCNC: 4 MMOL/L — SIGNIFICANT CHANGE UP (ref 3.5–5.3)
POTASSIUM SERPL-MCNC: 4 MMOL/L — SIGNIFICANT CHANGE UP (ref 3.5–5.3)
POTASSIUM SERPL-MCNC: 4.1 MMOL/L — SIGNIFICANT CHANGE UP (ref 3.5–5.3)
POTASSIUM SERPL-MCNC: 4.1 MMOL/L — SIGNIFICANT CHANGE UP (ref 3.5–5.3)
POTASSIUM SERPL-MCNC: 4.2 MMOL/L — SIGNIFICANT CHANGE UP (ref 3.5–5.3)
POTASSIUM SERPL-MCNC: 4.4 MMOL/L — SIGNIFICANT CHANGE UP (ref 3.5–5.3)
POTASSIUM SERPL-MCNC: 4.5 MMOL/L — SIGNIFICANT CHANGE UP (ref 3.5–5.3)
POTASSIUM SERPL-MCNC: 4.5 MMOL/L — SIGNIFICANT CHANGE UP (ref 3.5–5.3)
POTASSIUM SERPL-MCNC: 4.7 MMOL/L — SIGNIFICANT CHANGE UP (ref 3.5–5.3)
POTASSIUM SERPL-MCNC: 5.1 MMOL/L — SIGNIFICANT CHANGE UP (ref 3.5–5.3)
POTASSIUM SERPL-SCNC: 3.1 MMOL/L — LOW (ref 3.5–5.3)
POTASSIUM SERPL-SCNC: 3.4 MMOL/L — LOW (ref 3.5–5.3)
POTASSIUM SERPL-SCNC: 3.5 MMOL/L — SIGNIFICANT CHANGE UP (ref 3.5–5.3)
POTASSIUM SERPL-SCNC: 3.5 MMOL/L — SIGNIFICANT CHANGE UP (ref 3.5–5.3)
POTASSIUM SERPL-SCNC: 3.6 MMOL/L — SIGNIFICANT CHANGE UP (ref 3.5–5.3)
POTASSIUM SERPL-SCNC: 3.7 MMOL/L — SIGNIFICANT CHANGE UP (ref 3.5–5.3)
POTASSIUM SERPL-SCNC: 3.8 MMOL/L — SIGNIFICANT CHANGE UP (ref 3.5–5.3)
POTASSIUM SERPL-SCNC: 3.9 MMOL/L — SIGNIFICANT CHANGE UP (ref 3.5–5.3)
POTASSIUM SERPL-SCNC: 4 MMOL/L — SIGNIFICANT CHANGE UP (ref 3.5–5.3)
POTASSIUM SERPL-SCNC: 4 MMOL/L — SIGNIFICANT CHANGE UP (ref 3.5–5.3)
POTASSIUM SERPL-SCNC: 4.1 MMOL/L
POTASSIUM SERPL-SCNC: 4.1 MMOL/L — SIGNIFICANT CHANGE UP (ref 3.5–5.3)
POTASSIUM SERPL-SCNC: 4.1 MMOL/L — SIGNIFICANT CHANGE UP (ref 3.5–5.3)
POTASSIUM SERPL-SCNC: 4.2 MMOL/L — SIGNIFICANT CHANGE UP (ref 3.5–5.3)
POTASSIUM SERPL-SCNC: 4.4 MMOL/L — SIGNIFICANT CHANGE UP (ref 3.5–5.3)
POTASSIUM SERPL-SCNC: 4.5 MMOL/L — SIGNIFICANT CHANGE UP (ref 3.5–5.3)
POTASSIUM SERPL-SCNC: 4.5 MMOL/L — SIGNIFICANT CHANGE UP (ref 3.5–5.3)
POTASSIUM SERPL-SCNC: 4.7 MMOL/L — SIGNIFICANT CHANGE UP (ref 3.5–5.3)
POTASSIUM SERPL-SCNC: 5.1 MMOL/L — SIGNIFICANT CHANGE UP (ref 3.5–5.3)
PROCALCITONIN SERPL-MCNC: 0.18 NG/ML — HIGH
PROT SERPL-MCNC: 5.7 G/DL — LOW (ref 6–8.3)
PROT SERPL-MCNC: 5.8 G/DL — LOW (ref 6–8.3)
PROT SERPL-MCNC: 5.9 G/DL — LOW (ref 6–8.3)
PROT SERPL-MCNC: 6 G/DL — SIGNIFICANT CHANGE UP (ref 6–8.3)
PROT SERPL-MCNC: 6.1 G/DL — SIGNIFICANT CHANGE UP (ref 6–8.3)
PROT SERPL-MCNC: 6.2 G/DL — SIGNIFICANT CHANGE UP (ref 6–8.3)
PROT SERPL-MCNC: 6.4 G/DL — SIGNIFICANT CHANGE UP (ref 6–8.3)
PROT SERPL-MCNC: 6.5 G/DL
PROT SERPL-MCNC: 6.5 G/DL — SIGNIFICANT CHANGE UP (ref 6–8.3)
PROT SERPL-MCNC: 6.8 G/DL — SIGNIFICANT CHANGE UP (ref 6–8.3)
PROT SERPL-MCNC: 6.9 G/DL — SIGNIFICANT CHANGE UP (ref 6–8.3)
PROT SERPL-MCNC: 7 G/DL — SIGNIFICANT CHANGE UP (ref 6–8.3)
PROT SERPL-MCNC: 7.2 G/DL — SIGNIFICANT CHANGE UP (ref 6–8.3)
PROT UR-MCNC: 100 MG/DL
PROT UR-MCNC: 30 MG/DL
PROTHROM AB SERPL-ACNC: 11.8 SEC — SIGNIFICANT CHANGE UP (ref 9.5–13)
PROTHROM AB SERPL-ACNC: 13.3 SEC — HIGH (ref 9.5–13)
PROTHROM AB SERPL-ACNC: 13.7 SEC — HIGH (ref 9.5–13)
PROTHROM AB SERPL-ACNC: 15.7 SEC — HIGH (ref 9.5–13)
PROTHROM AB SERPL-ACNC: 16.4 SEC — HIGH (ref 9.5–13)
PT BLD: 10.8 SEC
RBC # BLD: 3.02 M/UL — LOW (ref 4.2–5.8)
RBC # BLD: 3.17 M/UL — LOW (ref 4.2–5.8)
RBC # BLD: 3.33 M/UL — LOW (ref 4.2–5.8)
RBC # BLD: 3.59 M/UL — LOW (ref 4.2–5.8)
RBC # BLD: 4.05 M/UL — LOW (ref 4.2–5.8)
RBC # BLD: 4.19 M/UL — LOW (ref 4.2–5.8)
RBC # BLD: 4.22 M/UL — SIGNIFICANT CHANGE UP (ref 4.2–5.8)
RBC # BLD: 4.35 M/UL — SIGNIFICANT CHANGE UP (ref 4.2–5.8)
RBC # BLD: 4.37 M/UL — SIGNIFICANT CHANGE UP (ref 4.2–5.8)
RBC # BLD: 4.4 M/UL — SIGNIFICANT CHANGE UP (ref 4.2–5.8)
RBC # BLD: 4.45 M/UL — SIGNIFICANT CHANGE UP (ref 4.2–5.8)
RBC # BLD: 4.46 M/UL — SIGNIFICANT CHANGE UP (ref 4.2–5.8)
RBC # BLD: 4.5 M/UL — SIGNIFICANT CHANGE UP (ref 4.2–5.8)
RBC # BLD: 4.54 M/UL — SIGNIFICANT CHANGE UP (ref 4.2–5.8)
RBC # BLD: 4.55 M/UL — SIGNIFICANT CHANGE UP (ref 4.2–5.8)
RBC # BLD: 4.57 M/UL — SIGNIFICANT CHANGE UP (ref 4.2–5.8)
RBC # BLD: 4.58 M/UL — SIGNIFICANT CHANGE UP (ref 4.2–5.8)
RBC # BLD: 4.69 M/UL — SIGNIFICANT CHANGE UP (ref 4.2–5.8)
RBC # BLD: 4.71 M/UL — SIGNIFICANT CHANGE UP (ref 4.2–5.8)
RBC # BLD: 4.72 M/UL — SIGNIFICANT CHANGE UP (ref 4.2–5.8)
RBC # BLD: 4.77 M/UL — SIGNIFICANT CHANGE UP (ref 4.2–5.8)
RBC # BLD: 4.84 M/UL — SIGNIFICANT CHANGE UP (ref 4.2–5.8)
RBC # BLD: 4.87 M/UL — SIGNIFICANT CHANGE UP (ref 4.2–5.8)
RBC # BLD: 4.88 M/UL — SIGNIFICANT CHANGE UP (ref 4.2–5.8)
RBC # BLD: 5 M/UL — SIGNIFICANT CHANGE UP (ref 4.2–5.8)
RBC # BLD: 5.02 M/UL — SIGNIFICANT CHANGE UP (ref 4.2–5.8)
RBC # BLD: 5.1 M/UL — SIGNIFICANT CHANGE UP (ref 4.2–5.8)
RBC # BLD: 5.11 M/UL — SIGNIFICANT CHANGE UP (ref 4.2–5.8)
RBC # BLD: 5.17 M/UL — SIGNIFICANT CHANGE UP (ref 4.2–5.8)
RBC # BLD: 5.26 M/UL
RBC # BLD: 5.34 M/UL — SIGNIFICANT CHANGE UP (ref 4.2–5.8)
RBC # FLD: 12.7 % — SIGNIFICANT CHANGE UP (ref 10.3–14.5)
RBC # FLD: 12.8 % — SIGNIFICANT CHANGE UP (ref 10.3–14.5)
RBC # FLD: 12.9 % — SIGNIFICANT CHANGE UP (ref 10.3–14.5)
RBC # FLD: 13 % — SIGNIFICANT CHANGE UP (ref 10.3–14.5)
RBC # FLD: 13.1 %
RBC # FLD: 13.1 % — SIGNIFICANT CHANGE UP (ref 10.3–14.5)
RBC # FLD: 13.2 % — SIGNIFICANT CHANGE UP (ref 10.3–14.5)
RBC # FLD: 14.3 % — SIGNIFICANT CHANGE UP (ref 10.3–14.5)
RBC # FLD: 14.3 % — SIGNIFICANT CHANGE UP (ref 10.3–14.5)
RBC # FLD: 14.5 % — SIGNIFICANT CHANGE UP (ref 10.3–14.5)
RBC # FLD: 14.6 % — HIGH (ref 10.3–14.5)
RBC # FLD: 14.6 % — HIGH (ref 10.3–14.5)
RBC CASTS # UR COMP ASSIST: 0 /HPF — SIGNIFICANT CHANGE UP (ref 0–4)
RSV RNA NPH QL NAA+NON-PROBE: SIGNIFICANT CHANGE UP
S AUREUS DNA NOSE QL NAA+PROBE: SIGNIFICANT CHANGE UP
SAO2 % BLDA: 100 % — HIGH (ref 94–98)
SAO2 % BLDA: 94.5 % — SIGNIFICANT CHANGE UP (ref 94–98)
SAO2 % BLDA: 96.7 % — SIGNIFICANT CHANGE UP (ref 94–98)
SAO2 % BLDA: 98.3 % — HIGH (ref 94–98)
SAO2 % BLDA: 98.5 % — HIGH (ref 94–98)
SAO2 % BLDA: 99.7 % — HIGH (ref 94–98)
SAO2 % BLDV: 95.2 % — HIGH (ref 67–88)
SARS-COV-2 RNA SPEC QL NAA+PROBE: SIGNIFICANT CHANGE UP
SODIUM SERPL-SCNC: 138 MMOL/L — SIGNIFICANT CHANGE UP (ref 135–145)
SODIUM SERPL-SCNC: 138 MMOL/L — SIGNIFICANT CHANGE UP (ref 135–145)
SODIUM SERPL-SCNC: 139 MMOL/L — SIGNIFICANT CHANGE UP (ref 135–145)
SODIUM SERPL-SCNC: 139 MMOL/L — SIGNIFICANT CHANGE UP (ref 135–145)
SODIUM SERPL-SCNC: 140 MMOL/L — SIGNIFICANT CHANGE UP (ref 135–145)
SODIUM SERPL-SCNC: 140 MMOL/L — SIGNIFICANT CHANGE UP (ref 135–145)
SODIUM SERPL-SCNC: 141 MMOL/L — SIGNIFICANT CHANGE UP (ref 135–145)
SODIUM SERPL-SCNC: 142 MMOL/L
SODIUM SERPL-SCNC: 142 MMOL/L — SIGNIFICANT CHANGE UP (ref 135–145)
SODIUM SERPL-SCNC: 143 MMOL/L — SIGNIFICANT CHANGE UP (ref 135–145)
SODIUM SERPL-SCNC: 143 MMOL/L — SIGNIFICANT CHANGE UP (ref 135–145)
SODIUM SERPL-SCNC: 144 MMOL/L — SIGNIFICANT CHANGE UP (ref 135–145)
SODIUM SERPL-SCNC: 145 MMOL/L — SIGNIFICANT CHANGE UP (ref 135–145)
SODIUM SERPL-SCNC: 146 MMOL/L — HIGH (ref 135–145)
SODIUM SERPL-SCNC: 146 MMOL/L — HIGH (ref 135–145)
SODIUM SERPL-SCNC: 147 MMOL/L — HIGH (ref 135–145)
SODIUM SERPL-SCNC: 148 MMOL/L — HIGH (ref 135–145)
SODIUM SERPL-SCNC: 149 MMOL/L — HIGH (ref 135–145)
SODIUM SERPL-SCNC: 149 MMOL/L — HIGH (ref 135–145)
SODIUM SERPL-SCNC: 150 MMOL/L — HIGH (ref 135–145)
SODIUM SERPL-SCNC: 150 MMOL/L — HIGH (ref 135–145)
SODIUM SERPL-SCNC: 155 MMOL/L — HIGH (ref 135–145)
SODIUM UR-SCNC: 24 MMOL/L — SIGNIFICANT CHANGE UP
SP GR SPEC: 1.01 — SIGNIFICANT CHANGE UP (ref 1–1.03)
SP GR SPEC: 1.03 — SIGNIFICANT CHANGE UP (ref 1–1.03)
SPECIMEN SOURCE: SIGNIFICANT CHANGE UP
T3 SERPL-MCNC: 112 NG/DL — SIGNIFICANT CHANGE UP (ref 80–200)
T4 AB SER-ACNC: 7.3 UG/DL — SIGNIFICANT CHANGE UP (ref 4.6–12)
T4 FREE SERPL-MCNC: 1.4 NG/DL — SIGNIFICANT CHANGE UP (ref 0.9–1.8)
TRIGL SERPL-MCNC: 93 MG/DL — SIGNIFICANT CHANGE UP
TROPONIN I, HIGH SENSITIVITY RESULT: 185.6 NG/L — HIGH
TROPONIN I, HIGH SENSITIVITY RESULT: 448.2 NG/L — HIGH
TROPONIN I, HIGH SENSITIVITY RESULT: 507.9 NG/L — HIGH
TROPONIN I, HIGH SENSITIVITY RESULT: 617 NG/L — HIGH
TROPONIN I, HIGH SENSITIVITY RESULT: 733.5 NG/L — HIGH
TSH SERPL-MCNC: 1.56 UIU/ML — SIGNIFICANT CHANGE UP (ref 0.36–3.74)
TSH SERPL-MCNC: 4 UIU/ML — HIGH (ref 0.36–3.74)
UROBILINOGEN FLD QL: 1 MG/DL — SIGNIFICANT CHANGE UP (ref 0.2–1)
UROBILINOGEN FLD QL: 1 MG/DL — SIGNIFICANT CHANGE UP (ref 0.2–1)
WBC # BLD: 10.2 K/UL — SIGNIFICANT CHANGE UP (ref 3.8–10.5)
WBC # BLD: 10.4 K/UL — SIGNIFICANT CHANGE UP (ref 3.8–10.5)
WBC # BLD: 10.53 K/UL — HIGH (ref 3.8–10.5)
WBC # BLD: 12.12 K/UL — HIGH (ref 3.8–10.5)
WBC # BLD: 13.31 K/UL — HIGH (ref 3.8–10.5)
WBC # BLD: 15.45 K/UL — HIGH (ref 3.8–10.5)
WBC # BLD: 15.71 K/UL — HIGH (ref 3.8–10.5)
WBC # BLD: 15.99 K/UL — HIGH (ref 3.8–10.5)
WBC # BLD: 16.67 K/UL — HIGH (ref 3.8–10.5)
WBC # BLD: 17.88 K/UL — HIGH (ref 3.8–10.5)
WBC # BLD: 5.88 K/UL — SIGNIFICANT CHANGE UP (ref 3.8–10.5)
WBC # BLD: 5.9 K/UL — SIGNIFICANT CHANGE UP (ref 3.8–10.5)
WBC # BLD: 6.09 K/UL — SIGNIFICANT CHANGE UP (ref 3.8–10.5)
WBC # BLD: 6.18 K/UL — SIGNIFICANT CHANGE UP (ref 3.8–10.5)
WBC # BLD: 6.6 K/UL — SIGNIFICANT CHANGE UP (ref 3.8–10.5)
WBC # BLD: 7.22 K/UL — SIGNIFICANT CHANGE UP (ref 3.8–10.5)
WBC # BLD: 7.27 K/UL — SIGNIFICANT CHANGE UP (ref 3.8–10.5)
WBC # BLD: 7.32 K/UL — SIGNIFICANT CHANGE UP (ref 3.8–10.5)
WBC # BLD: 7.58 K/UL — SIGNIFICANT CHANGE UP (ref 3.8–10.5)
WBC # BLD: 7.82 K/UL — SIGNIFICANT CHANGE UP (ref 3.8–10.5)
WBC # BLD: 7.9 K/UL — SIGNIFICANT CHANGE UP (ref 3.8–10.5)
WBC # BLD: 7.93 K/UL — SIGNIFICANT CHANGE UP (ref 3.8–10.5)
WBC # BLD: 7.99 K/UL — SIGNIFICANT CHANGE UP (ref 3.8–10.5)
WBC # BLD: 8.13 K/UL — SIGNIFICANT CHANGE UP (ref 3.8–10.5)
WBC # BLD: 8.39 K/UL — SIGNIFICANT CHANGE UP (ref 3.8–10.5)
WBC # BLD: 8.57 K/UL — SIGNIFICANT CHANGE UP (ref 3.8–10.5)
WBC # BLD: 9.2 K/UL — SIGNIFICANT CHANGE UP (ref 3.8–10.5)
WBC # BLD: 9.21 K/UL — SIGNIFICANT CHANGE UP (ref 3.8–10.5)
WBC # BLD: 9.57 K/UL — SIGNIFICANT CHANGE UP (ref 3.8–10.5)
WBC # BLD: 9.75 K/UL — SIGNIFICANT CHANGE UP (ref 3.8–10.5)
WBC # FLD AUTO: 10.2 K/UL — SIGNIFICANT CHANGE UP (ref 3.8–10.5)
WBC # FLD AUTO: 10.4 K/UL — SIGNIFICANT CHANGE UP (ref 3.8–10.5)
WBC # FLD AUTO: 10.53 K/UL — HIGH (ref 3.8–10.5)
WBC # FLD AUTO: 12.12 K/UL — HIGH (ref 3.8–10.5)
WBC # FLD AUTO: 13.31 K/UL — HIGH (ref 3.8–10.5)
WBC # FLD AUTO: 15.45 K/UL — HIGH (ref 3.8–10.5)
WBC # FLD AUTO: 15.71 K/UL — HIGH (ref 3.8–10.5)
WBC # FLD AUTO: 15.99 K/UL — HIGH (ref 3.8–10.5)
WBC # FLD AUTO: 16.67 K/UL — HIGH (ref 3.8–10.5)
WBC # FLD AUTO: 17.88 K/UL — HIGH (ref 3.8–10.5)
WBC # FLD AUTO: 5.88 K/UL — SIGNIFICANT CHANGE UP (ref 3.8–10.5)
WBC # FLD AUTO: 5.9 K/UL — SIGNIFICANT CHANGE UP (ref 3.8–10.5)
WBC # FLD AUTO: 6.09 K/UL — SIGNIFICANT CHANGE UP (ref 3.8–10.5)
WBC # FLD AUTO: 6.18 K/UL — SIGNIFICANT CHANGE UP (ref 3.8–10.5)
WBC # FLD AUTO: 6.46 K/UL
WBC # FLD AUTO: 6.6 K/UL — SIGNIFICANT CHANGE UP (ref 3.8–10.5)
WBC # FLD AUTO: 7.22 K/UL — SIGNIFICANT CHANGE UP (ref 3.8–10.5)
WBC # FLD AUTO: 7.27 K/UL — SIGNIFICANT CHANGE UP (ref 3.8–10.5)
WBC # FLD AUTO: 7.32 K/UL — SIGNIFICANT CHANGE UP (ref 3.8–10.5)
WBC # FLD AUTO: 7.58 K/UL — SIGNIFICANT CHANGE UP (ref 3.8–10.5)
WBC # FLD AUTO: 7.82 K/UL — SIGNIFICANT CHANGE UP (ref 3.8–10.5)
WBC # FLD AUTO: 7.9 K/UL — SIGNIFICANT CHANGE UP (ref 3.8–10.5)
WBC # FLD AUTO: 7.93 K/UL — SIGNIFICANT CHANGE UP (ref 3.8–10.5)
WBC # FLD AUTO: 7.99 K/UL — SIGNIFICANT CHANGE UP (ref 3.8–10.5)
WBC # FLD AUTO: 8.13 K/UL — SIGNIFICANT CHANGE UP (ref 3.8–10.5)
WBC # FLD AUTO: 8.39 K/UL — SIGNIFICANT CHANGE UP (ref 3.8–10.5)
WBC # FLD AUTO: 8.57 K/UL — SIGNIFICANT CHANGE UP (ref 3.8–10.5)
WBC # FLD AUTO: 9.2 K/UL — SIGNIFICANT CHANGE UP (ref 3.8–10.5)
WBC # FLD AUTO: 9.21 K/UL — SIGNIFICANT CHANGE UP (ref 3.8–10.5)
WBC # FLD AUTO: 9.57 K/UL — SIGNIFICANT CHANGE UP (ref 3.8–10.5)
WBC # FLD AUTO: 9.75 K/UL — SIGNIFICANT CHANGE UP (ref 3.8–10.5)
WBC UR QL: 1 /HPF — SIGNIFICANT CHANGE UP (ref 0–5)

## 2024-01-01 PROCEDURE — 92522 EVALUATE SPEECH PRODUCTION: CPT

## 2024-01-01 PROCEDURE — 99232 SBSQ HOSP IP/OBS MODERATE 35: CPT

## 2024-01-01 PROCEDURE — 76770 US EXAM ABDO BACK WALL COMP: CPT | Mod: 26

## 2024-01-01 PROCEDURE — 97166 OT EVAL MOD COMPLEX 45 MIN: CPT

## 2024-01-01 PROCEDURE — 99233 SBSQ HOSP IP/OBS HIGH 50: CPT | Mod: GC

## 2024-01-01 PROCEDURE — 70498 CT ANGIOGRAPHY NECK: CPT | Mod: 26

## 2024-01-01 PROCEDURE — 71045 X-RAY EXAM CHEST 1 VIEW: CPT | Mod: 26

## 2024-01-01 PROCEDURE — 70450 CT HEAD/BRAIN W/O DYE: CPT | Mod: 26

## 2024-01-01 PROCEDURE — 82962 GLUCOSE BLOOD TEST: CPT

## 2024-01-01 PROCEDURE — 0042T: CPT | Mod: MC

## 2024-01-01 PROCEDURE — 99291 CRITICAL CARE FIRST HOUR: CPT

## 2024-01-01 PROCEDURE — 84484 ASSAY OF TROPONIN QUANT: CPT

## 2024-01-01 PROCEDURE — 99233 SBSQ HOSP IP/OBS HIGH 50: CPT

## 2024-01-01 PROCEDURE — 84145 PROCALCITONIN (PCT): CPT

## 2024-01-01 PROCEDURE — 84443 ASSAY THYROID STIM HORMONE: CPT

## 2024-01-01 PROCEDURE — 92610 EVALUATE SWALLOWING FUNCTION: CPT

## 2024-01-01 PROCEDURE — G0463: CPT

## 2024-01-01 PROCEDURE — 84436 ASSAY OF TOTAL THYROXINE: CPT

## 2024-01-01 PROCEDURE — 97162 PT EVAL MOD COMPLEX 30 MIN: CPT

## 2024-01-01 PROCEDURE — 93923 UPR/LXTR ART STDY 3+ LVLS: CPT

## 2024-01-01 PROCEDURE — 70450 CT HEAD/BRAIN W/O DYE: CPT | Mod: 26,MC

## 2024-01-01 PROCEDURE — 93925 LOWER EXTREMITY STUDY: CPT

## 2024-01-01 PROCEDURE — 84100 ASSAY OF PHOSPHORUS: CPT

## 2024-01-01 PROCEDURE — 74230 X-RAY XM SWLNG FUNCJ C+: CPT

## 2024-01-01 PROCEDURE — 71250 CT THORAX DX C-: CPT | Mod: 26,MC

## 2024-01-01 PROCEDURE — 94640 AIRWAY INHALATION TREATMENT: CPT

## 2024-01-01 PROCEDURE — 87637 SARSCOV2&INF A&B&RSV AMP PRB: CPT

## 2024-01-01 PROCEDURE — 93010 ELECTROCARDIOGRAM REPORT: CPT

## 2024-01-01 PROCEDURE — 97112 NEUROMUSCULAR REEDUCATION: CPT

## 2024-01-01 PROCEDURE — 82803 BLOOD GASES ANY COMBINATION: CPT

## 2024-01-01 PROCEDURE — 99285 EMERGENCY DEPT VISIT HI MDM: CPT | Mod: 25

## 2024-01-01 PROCEDURE — 94760 N-INVAS EAR/PLS OXIMETRY 1: CPT

## 2024-01-01 PROCEDURE — 83690 ASSAY OF LIPASE: CPT

## 2024-01-01 PROCEDURE — 85027 COMPLETE CBC AUTOMATED: CPT

## 2024-01-01 PROCEDURE — 36415 COLL VENOUS BLD VENIPUNCTURE: CPT

## 2024-01-01 PROCEDURE — 73630 X-RAY EXAM OF FOOT: CPT

## 2024-01-01 PROCEDURE — 99285 EMERGENCY DEPT VISIT HI MDM: CPT

## 2024-01-01 PROCEDURE — 93005 ELECTROCARDIOGRAM TRACING: CPT

## 2024-01-01 PROCEDURE — 94660 CPAP INITIATION&MGMT: CPT

## 2024-01-01 PROCEDURE — 82550 ASSAY OF CK (CPK): CPT

## 2024-01-01 PROCEDURE — 36600 WITHDRAWAL OF ARTERIAL BLOOD: CPT

## 2024-01-01 PROCEDURE — 99232 SBSQ HOSP IP/OBS MODERATE 35: CPT | Mod: GC

## 2024-01-01 PROCEDURE — L8699: CPT

## 2024-01-01 PROCEDURE — 93978 VASCULAR STUDY: CPT

## 2024-01-01 PROCEDURE — 83735 ASSAY OF MAGNESIUM: CPT

## 2024-01-01 PROCEDURE — 73720 MRI LWR EXTREMITY W/O&W/DYE: CPT | Mod: 26,RT

## 2024-01-01 PROCEDURE — 83930 ASSAY OF BLOOD OSMOLALITY: CPT

## 2024-01-01 PROCEDURE — 70496 CT ANGIOGRAPHY HEAD: CPT | Mod: 26

## 2024-01-01 PROCEDURE — 82009 KETONE BODYS QUAL: CPT

## 2024-01-01 PROCEDURE — 85025 COMPLETE CBC W/AUTO DIFF WBC: CPT

## 2024-01-01 PROCEDURE — 85610 PROTHROMBIN TIME: CPT

## 2024-01-01 PROCEDURE — 70450 CT HEAD/BRAIN W/O DYE: CPT | Mod: MC

## 2024-01-01 PROCEDURE — 85730 THROMBOPLASTIN TIME PARTIAL: CPT

## 2024-01-01 PROCEDURE — A9698: CPT

## 2024-01-01 PROCEDURE — 93306 TTE W/DOPPLER COMPLETE: CPT

## 2024-01-01 PROCEDURE — G0506: CPT

## 2024-01-01 PROCEDURE — 80053 COMPREHEN METABOLIC PANEL: CPT

## 2024-01-01 PROCEDURE — 87086 URINE CULTURE/COLONY COUNT: CPT

## 2024-01-01 PROCEDURE — 84300 ASSAY OF URINE SODIUM: CPT

## 2024-01-01 PROCEDURE — 74176 CT ABD & PELVIS W/O CONTRAST: CPT | Mod: MC

## 2024-01-01 PROCEDURE — 84439 ASSAY OF FREE THYROXINE: CPT

## 2024-01-01 PROCEDURE — 93971 EXTREMITY STUDY: CPT | Mod: 26,LT

## 2024-01-01 PROCEDURE — 87640 STAPH A DNA AMP PROBE: CPT

## 2024-01-01 PROCEDURE — 87641 MR-STAPH DNA AMP PROBE: CPT

## 2024-01-01 PROCEDURE — 99291 CRITICAL CARE FIRST HOUR: CPT | Mod: GC

## 2024-01-01 PROCEDURE — 87040 BLOOD CULTURE FOR BACTERIA: CPT

## 2024-01-01 PROCEDURE — 93971 EXTREMITY STUDY: CPT

## 2024-01-01 PROCEDURE — 73630 X-RAY EXAM OF FOOT: CPT | Mod: 26,50

## 2024-01-01 PROCEDURE — 76770 US EXAM ABDO BACK WALL COMP: CPT

## 2024-01-01 PROCEDURE — 74176 CT ABD & PELVIS W/O CONTRAST: CPT | Mod: 26,MC

## 2024-01-01 PROCEDURE — 99203 OFFICE O/P NEW LOW 30 MIN: CPT

## 2024-01-01 PROCEDURE — 99213 OFFICE O/P EST LOW 20 MIN: CPT

## 2024-01-01 PROCEDURE — 70496 CT ANGIOGRAPHY HEAD: CPT | Mod: MC

## 2024-01-01 PROCEDURE — 83036 HEMOGLOBIN GLYCOSYLATED A1C: CPT

## 2024-01-01 PROCEDURE — 83880 ASSAY OF NATRIURETIC PEPTIDE: CPT

## 2024-01-01 PROCEDURE — 74230 X-RAY XM SWLNG FUNCJ C+: CPT | Mod: 26

## 2024-01-01 PROCEDURE — 99222 1ST HOSP IP/OBS MODERATE 55: CPT | Mod: GC

## 2024-01-01 PROCEDURE — 71045 X-RAY EXAM CHEST 1 VIEW: CPT

## 2024-01-01 PROCEDURE — 82553 CREATINE MB FRACTION: CPT

## 2024-01-01 PROCEDURE — 99223 1ST HOSP IP/OBS HIGH 75: CPT

## 2024-01-01 PROCEDURE — 76937 US GUIDE VASCULAR ACCESS: CPT

## 2024-01-01 PROCEDURE — 96374 THER/PROPH/DIAG INJ IV PUSH: CPT

## 2024-01-01 PROCEDURE — 99222 1ST HOSP IP/OBS MODERATE 55: CPT

## 2024-01-01 PROCEDURE — 37225Z: CUSTOM | Mod: RT,53

## 2024-01-01 PROCEDURE — 70551 MRI BRAIN STEM W/O DYE: CPT | Mod: MC

## 2024-01-01 PROCEDURE — 99214 OFFICE O/P EST MOD 30 MIN: CPT | Mod: 25

## 2024-01-01 PROCEDURE — 71250 CT THORAX DX C-: CPT | Mod: MC

## 2024-01-01 PROCEDURE — 0042T: CPT

## 2024-01-01 PROCEDURE — 97535 SELF CARE MNGMENT TRAINING: CPT

## 2024-01-01 PROCEDURE — 97110 THERAPEUTIC EXERCISES: CPT

## 2024-01-01 PROCEDURE — 99239 HOSP IP/OBS DSCHRG MGMT >30: CPT | Mod: GC

## 2024-01-01 PROCEDURE — 93926 LOWER EXTREMITY STUDY: CPT

## 2024-01-01 PROCEDURE — 96375 TX/PRO/DX INJ NEW DRUG ADDON: CPT

## 2024-01-01 PROCEDURE — 81001 URINALYSIS AUTO W/SCOPE: CPT

## 2024-01-01 PROCEDURE — 83605 ASSAY OF LACTIC ACID: CPT

## 2024-01-01 PROCEDURE — 99215 OFFICE O/P EST HI 40 MIN: CPT

## 2024-01-01 PROCEDURE — 92526 ORAL FUNCTION THERAPY: CPT

## 2024-01-01 PROCEDURE — 70450 CT HEAD/BRAIN W/O DYE: CPT | Mod: 26,XU,77

## 2024-01-01 PROCEDURE — 70498 CT ANGIOGRAPHY NECK: CPT | Mod: MC

## 2024-01-01 PROCEDURE — 87077 CULTURE AEROBIC IDENTIFY: CPT

## 2024-01-01 PROCEDURE — 70551 MRI BRAIN STEM W/O DYE: CPT | Mod: 26

## 2024-01-01 PROCEDURE — 99214 OFFICE O/P EST MOD 30 MIN: CPT

## 2024-01-01 PROCEDURE — 84480 ASSAY TRIIODOTHYRONINE (T3): CPT

## 2024-01-01 PROCEDURE — 93010 ELECTROCARDIOGRAM REPORT: CPT | Mod: 77

## 2024-01-01 PROCEDURE — 97530 THERAPEUTIC ACTIVITIES: CPT

## 2024-01-01 PROCEDURE — 87070 CULTURE OTHR SPECIMN AEROBIC: CPT

## 2024-01-01 PROCEDURE — 80048 BASIC METABOLIC PNL TOTAL CA: CPT

## 2024-01-01 PROCEDURE — 93306 TTE W/DOPPLER COMPLETE: CPT | Mod: 26

## 2024-01-01 PROCEDURE — 73720 MRI LWR EXTREMITY W/O&W/DYE: CPT

## 2024-01-01 PROCEDURE — 80061 LIPID PANEL: CPT

## 2024-01-01 PROCEDURE — 70450 CT HEAD/BRAIN W/O DYE: CPT | Mod: 26,59

## 2024-01-01 PROCEDURE — 97116 GAIT TRAINING THERAPY: CPT

## 2024-01-01 DEVICE — KIT ENDO SAFTEY PEG PULL STD 20 FR ENDOVIVE: Type: IMPLANTABLE DEVICE | Status: FUNCTIONAL

## 2024-01-01 DEVICE — PEG SAFETY 20FR PUSH WITH AMP: Type: IMPLANTABLE DEVICE | Status: FUNCTIONAL

## 2024-01-01 DEVICE — KIT PEG ENDOVIVE ENFIT 20FR PUSH 2/BX: Type: IMPLANTABLE DEVICE | Status: FUNCTIONAL

## 2024-01-01 DEVICE — HEMOSPRAY HEMOSTAT ENDO 7F: Type: IMPLANTABLE DEVICE | Status: FUNCTIONAL

## 2024-01-01 DEVICE — PEG KT SAFETY 20FR: Type: IMPLANTABLE DEVICE | Status: FUNCTIONAL

## 2024-01-01 DEVICE — ESOPHAGEAL BALLOON CATH CRE FIXED WIRE 6-7-8MM: Type: IMPLANTABLE DEVICE | Status: FUNCTIONAL

## 2024-01-01 RX ORDER — APIXABAN 2.5 MG/1
5 TABLET, FILM COATED ORAL
Refills: 0 | Status: DISCONTINUED | OUTPATIENT
Start: 2024-01-01 | End: 2024-01-01

## 2024-01-01 RX ORDER — METOPROLOL TARTRATE 50 MG
25 TABLET ORAL
Refills: 0 | Status: DISCONTINUED | OUTPATIENT
Start: 2024-01-01 | End: 2024-01-01

## 2024-01-01 RX ORDER — DILTIAZEM HCL 120 MG
10 CAPSULE, EXT RELEASE 24 HR ORAL
Qty: 125 | Refills: 0 | Status: DISCONTINUED | OUTPATIENT
Start: 2024-01-01 | End: 2024-01-01

## 2024-01-01 RX ORDER — AMIODARONE HYDROCHLORIDE 400 MG/1
200 TABLET ORAL DAILY
Refills: 0 | Status: DISCONTINUED | OUTPATIENT
Start: 2024-01-01 | End: 2024-01-01

## 2024-01-01 RX ORDER — ALBUTEROL SULFATE 90 UG/1
108 (90 BASE) AEROSOL, METERED RESPIRATORY (INHALATION) EVERY 6 HOURS
Qty: 1 | Refills: 5 | Status: COMPLETED | COMMUNITY
Start: 2019-12-16 | End: 2024-01-01

## 2024-01-01 RX ORDER — CIPROFLOXACIN LACTATE 400MG/40ML
250 VIAL (ML) INTRAVENOUS EVERY 12 HOURS
Refills: 0 | Status: DISCONTINUED | OUTPATIENT
Start: 2024-01-01 | End: 2024-01-01

## 2024-01-01 RX ORDER — ASCORBIC ACID 60 MG
1 TABLET,CHEWABLE ORAL
Qty: 0 | Refills: 0 | DISCHARGE

## 2024-01-01 RX ORDER — IPRATROPIUM/ALBUTEROL SULFATE 18-103MCG
3 AEROSOL WITH ADAPTER (GRAM) INHALATION EVERY 6 HOURS
Refills: 0 | Status: DISCONTINUED | OUTPATIENT
Start: 2024-01-01 | End: 2024-01-01

## 2024-01-01 RX ORDER — BUDESONIDE, MICRONIZED 100 %
0.5 POWDER (GRAM) MISCELLANEOUS
Refills: 0 | Status: DISCONTINUED | OUTPATIENT
Start: 2024-01-01 | End: 2024-01-01

## 2024-01-01 RX ORDER — ACETAMINOPHEN 500 MG
1000 TABLET ORAL ONCE
Refills: 0 | Status: COMPLETED | OUTPATIENT
Start: 2024-01-01 | End: 2024-01-01

## 2024-01-01 RX ORDER — ZINC SULFATE TAB 220 MG (50 MG ZINC EQUIVALENT) 220 (50 ZN) MG
0 TAB ORAL
Refills: 0 | DISCHARGE

## 2024-01-01 RX ORDER — ACETAZOLAMIDE 250 MG/1
250 TABLET ORAL ONCE
Refills: 0 | Status: COMPLETED | OUTPATIENT
Start: 2024-01-01 | End: 2024-01-01

## 2024-01-01 RX ORDER — MONTELUKAST 4 MG/1
10 TABLET, CHEWABLE ORAL DAILY
Refills: 0 | Status: DISCONTINUED | OUTPATIENT
Start: 2024-01-01 | End: 2024-01-01

## 2024-01-01 RX ORDER — METOPROLOL TARTRATE 50 MG
1 TABLET ORAL
Qty: 0 | Refills: 0 | DISCHARGE
Start: 2024-01-01

## 2024-01-01 RX ORDER — INSULIN LISPRO 100/ML
VIAL (ML) SUBCUTANEOUS EVERY 6 HOURS
Refills: 0 | Status: DISCONTINUED | OUTPATIENT
Start: 2024-01-01 | End: 2024-01-01

## 2024-01-01 RX ORDER — POTASSIUM CHLORIDE 20 MEQ
10 PACKET (EA) ORAL
Refills: 0 | Status: COMPLETED | OUTPATIENT
Start: 2024-01-01 | End: 2024-01-01

## 2024-01-01 RX ORDER — MIDODRINE HYDROCHLORIDE 2.5 MG/1
10 TABLET ORAL EVERY 8 HOURS
Refills: 0 | Status: DISCONTINUED | OUTPATIENT
Start: 2024-01-01 | End: 2024-01-01

## 2024-01-01 RX ORDER — CEFAZOLIN SODIUM 1 G
2000 VIAL (EA) INJECTION ONCE
Refills: 0 | Status: COMPLETED | OUTPATIENT
Start: 2024-01-01 | End: 2024-01-01

## 2024-01-01 RX ORDER — ZINC SULFATE TAB 220 MG (50 MG ZINC EQUIVALENT) 220 (50 ZN) MG
1 TAB ORAL
Refills: 0 | DISCHARGE

## 2024-01-01 RX ORDER — HYDROMORPHONE HYDROCHLORIDE 2 MG/ML
0.5 INJECTION INTRAMUSCULAR; INTRAVENOUS; SUBCUTANEOUS
Refills: 0 | Status: DISCONTINUED | OUTPATIENT
Start: 2024-01-01 | End: 2024-01-01

## 2024-01-01 RX ORDER — DEXTROSE 10 % IN WATER 10 %
125 INTRAVENOUS SOLUTION INTRAVENOUS ONCE
Refills: 0 | Status: DISCONTINUED | OUTPATIENT
Start: 2024-01-01 | End: 2024-01-01

## 2024-01-01 RX ORDER — INSULIN LISPRO 100/ML
3 VIAL (ML) SUBCUTANEOUS EVERY 6 HOURS
Refills: 0 | Status: COMPLETED | OUTPATIENT
Start: 2024-01-01 | End: 2024-01-01

## 2024-01-01 RX ORDER — MONTELUKAST 10 MG/1
10 TABLET, FILM COATED ORAL
Qty: 90 | Refills: 2 | Status: ACTIVE | COMMUNITY
Start: 2024-01-01 | End: 1900-01-01

## 2024-01-01 RX ORDER — ENOXAPARIN SODIUM 100 MG/ML
100 INJECTION SUBCUTANEOUS EVERY 12 HOURS
Refills: 0 | Status: DISCONTINUED | OUTPATIENT
Start: 2024-01-01 | End: 2024-01-01

## 2024-01-01 RX ORDER — ACETAMINOPHEN 500 MG
650 TABLET ORAL EVERY 6 HOURS
Refills: 0 | Status: DISCONTINUED | OUTPATIENT
Start: 2024-01-01 | End: 2024-01-01

## 2024-01-01 RX ORDER — ASPIRIN/CALCIUM CARB/MAGNESIUM 324 MG
325 TABLET ORAL ONCE
Refills: 0 | Status: COMPLETED | OUTPATIENT
Start: 2024-01-01 | End: 2024-01-01

## 2024-01-01 RX ORDER — APIXABAN 2.5 MG/1
2.5 TABLET, FILM COATED ORAL
Refills: 0 | Status: DISCONTINUED | OUTPATIENT
Start: 2024-01-01 | End: 2024-01-01

## 2024-01-01 RX ORDER — HEPARIN SODIUM 5000 [USP'U]/ML
5000 INJECTION INTRAVENOUS; SUBCUTANEOUS EVERY 8 HOURS
Refills: 0 | Status: DISCONTINUED | OUTPATIENT
Start: 2024-01-01 | End: 2024-01-01

## 2024-01-01 RX ORDER — SODIUM CHLORIDE 9 MG/ML
1000 INJECTION, SOLUTION INTRAVENOUS
Refills: 0 | Status: DISCONTINUED | OUTPATIENT
Start: 2024-01-01 | End: 2024-01-01

## 2024-01-01 RX ORDER — FLUTICASONE FUROATE AND VILANTEROL TRIFENATATE 100; 25 UG/1; UG/1
1 POWDER RESPIRATORY (INHALATION)
Qty: 0 | Refills: 0 | DISCHARGE

## 2024-01-01 RX ORDER — FLUTICASONE FUROATE AND VILANTEROL TRIFENATATE 200; 25 UG/1; UG/1
200-25 POWDER RESPIRATORY (INHALATION)
Qty: 1 | Refills: 5 | Status: COMPLETED | COMMUNITY
Start: 2019-12-05 | End: 2024-01-01

## 2024-01-01 RX ORDER — PANTOPRAZOLE SODIUM 20 MG/1
40 TABLET, DELAYED RELEASE ORAL DAILY
Refills: 0 | Status: DISCONTINUED | OUTPATIENT
Start: 2024-01-01 | End: 2024-01-01

## 2024-01-01 RX ORDER — UMECLIDINIUM 62.5 UG/1
1 AEROSOL, POWDER ORAL
Refills: 0 | DISCHARGE

## 2024-01-01 RX ORDER — CICLOPIROX OLAMINE 7.7 MG/G
0.77 CREAM TOPICAL
Qty: 90 | Refills: 0 | Status: COMPLETED | COMMUNITY
Start: 2020-06-15 | End: 2024-01-01

## 2024-01-01 RX ORDER — DEXTROSE 50 % IN WATER 50 %
12.5 SYRINGE (ML) INTRAVENOUS ONCE
Refills: 0 | Status: DISCONTINUED | OUTPATIENT
Start: 2024-01-01 | End: 2024-01-01

## 2024-01-01 RX ORDER — AMIODARONE HYDROCHLORIDE 400 MG/1
400 TABLET ORAL EVERY 8 HOURS
Refills: 0 | Status: COMPLETED | OUTPATIENT
Start: 2024-01-01 | End: 2024-01-01

## 2024-01-01 RX ORDER — APIXABAN 2.5 MG/1
1 TABLET, FILM COATED ORAL
Qty: 60 | Refills: 0
Start: 2024-01-01 | End: 2024-06-15

## 2024-01-01 RX ORDER — PHENYLEPHRINE HYDROCHLORIDE 10 MG/ML
0.2 INJECTION INTRAVENOUS
Qty: 40 | Refills: 0 | Status: ACTIVE | OUTPATIENT
Start: 2024-01-01 | End: 2025-03-26

## 2024-01-01 RX ORDER — INSULIN LISPRO 100/ML
VIAL (ML) SUBCUTANEOUS AT BEDTIME
Refills: 0 | Status: DISCONTINUED | OUTPATIENT
Start: 2024-01-01 | End: 2024-01-01

## 2024-01-01 RX ORDER — CHOLECALCIFEROL (VITAMIN D3) 25 MCG
TABLET ORAL
Refills: 0 | Status: ACTIVE | COMMUNITY

## 2024-01-01 RX ORDER — INSULIN LISPRO 100/ML
3 VIAL (ML) SUBCUTANEOUS EVERY 6 HOURS
Refills: 0 | Status: DISCONTINUED | OUTPATIENT
Start: 2024-01-01 | End: 2024-01-01

## 2024-01-01 RX ORDER — AMIODARONE HYDROCHLORIDE 400 MG/1
0.5 TABLET ORAL
Qty: 450 | Refills: 0 | Status: DISCONTINUED | OUTPATIENT
Start: 2024-01-01 | End: 2024-01-01

## 2024-01-01 RX ORDER — MEPOLIZUMAB 100 MG/ML
100 INJECTION, SOLUTION SUBCUTANEOUS
Qty: 0 | Refills: 0 | DISCHARGE

## 2024-01-01 RX ORDER — MEPOLIZUMAB 100 MG/ML
100 INJECTION, SOLUTION SUBCUTANEOUS
Qty: 1 | Refills: 11 | Status: ACTIVE | COMMUNITY
Start: 2020-01-31

## 2024-01-01 RX ORDER — CIPROFLOXACIN LACTATE 400MG/40ML
500 VIAL (ML) INTRAVENOUS EVERY 12 HOURS
Refills: 0 | Status: DISCONTINUED | OUTPATIENT
Start: 2024-01-01 | End: 2024-01-01

## 2024-01-01 RX ORDER — UBIDECARENONE 100 MG
1 CAPSULE ORAL
Qty: 0 | Refills: 0 | DISCHARGE

## 2024-01-01 RX ORDER — GLUCAGON INJECTION, SOLUTION 0.5 MG/.1ML
1 INJECTION, SOLUTION SUBCUTANEOUS ONCE
Refills: 0 | Status: DISCONTINUED | OUTPATIENT
Start: 2024-01-01 | End: 2024-01-01

## 2024-01-01 RX ORDER — ZINC SULFATE TAB 220 MG (50 MG ZINC EQUIVALENT) 220 (50 ZN) MG
220 TAB ORAL DAILY
Refills: 0 | Status: DISCONTINUED | OUTPATIENT
Start: 2024-01-01 | End: 2024-01-01

## 2024-01-01 RX ORDER — PIPERACILLIN AND TAZOBACTAM 4; .5 G/20ML; G/20ML
3.38 INJECTION, POWDER, LYOPHILIZED, FOR SOLUTION INTRAVENOUS ONCE
Refills: 0 | Status: COMPLETED | OUTPATIENT
Start: 2024-01-01 | End: 2024-01-01

## 2024-01-01 RX ORDER — APIXABAN 2.5 MG/1
2.5 TABLET, FILM COATED ORAL EVERY 12 HOURS
Refills: 0 | Status: DISCONTINUED | OUTPATIENT
Start: 2024-01-01 | End: 2024-01-01

## 2024-01-01 RX ORDER — DEXTROSE 50 % IN WATER 50 %
25 SYRINGE (ML) INTRAVENOUS ONCE
Refills: 0 | Status: DISCONTINUED | OUTPATIENT
Start: 2024-01-01 | End: 2024-01-01

## 2024-01-01 RX ORDER — HYDROMORPHONE HYDROCHLORIDE 2 MG/ML
0.5 INJECTION INTRAMUSCULAR; INTRAVENOUS; SUBCUTANEOUS EVERY 4 HOURS
Refills: 0 | Status: DISCONTINUED | OUTPATIENT
Start: 2024-01-01 | End: 2024-01-01

## 2024-01-01 RX ORDER — CIPROFLOXACIN HYDROCHLORIDE 500 MG/1
500 TABLET, FILM COATED ORAL
Qty: 60 | Refills: 2 | Status: ACTIVE | COMMUNITY
Start: 2023-02-23 | End: 1900-01-01

## 2024-01-01 RX ORDER — ASPIRIN/CALCIUM CARB/MAGNESIUM 324 MG
300 TABLET ORAL DAILY
Refills: 0 | Status: DISCONTINUED | OUTPATIENT
Start: 2024-01-01 | End: 2024-01-01

## 2024-01-01 RX ORDER — CIPROFLOXACIN LACTATE 400MG/40ML
1 VIAL (ML) INTRAVENOUS
Refills: 0 | DISCHARGE

## 2024-01-01 RX ORDER — SODIUM CHLORIDE 9 MG/ML
1000 INJECTION INTRAMUSCULAR; INTRAVENOUS; SUBCUTANEOUS ONCE
Refills: 0 | Status: COMPLETED | OUTPATIENT
Start: 2024-01-01 | End: 2024-01-01

## 2024-01-01 RX ORDER — MONTELUKAST 4 MG/1
1 TABLET, CHEWABLE ORAL
Qty: 0 | Refills: 0 | DISCHARGE

## 2024-01-01 RX ORDER — ALBUTEROL 90 UG/1
2 AEROSOL, METERED ORAL EVERY 6 HOURS
Refills: 0 | Status: DISCONTINUED | OUTPATIENT
Start: 2024-01-01 | End: 2024-01-01

## 2024-01-01 RX ORDER — DEXTROSE 50 % IN WATER 50 %
15 SYRINGE (ML) INTRAVENOUS ONCE
Refills: 0 | Status: DISCONTINUED | OUTPATIENT
Start: 2024-01-01 | End: 2024-01-01

## 2024-01-01 RX ORDER — AMIODARONE HYDROCHLORIDE 400 MG/1
400 TABLET ORAL
Refills: 0 | Status: DISCONTINUED | OUTPATIENT
Start: 2024-01-01 | End: 2024-01-01

## 2024-01-01 RX ORDER — OMEGA-3 ACID ETHYL ESTERS 1 G
0 CAPSULE ORAL
Refills: 0 | DISCHARGE

## 2024-01-01 RX ORDER — INSULIN LISPRO 100/ML
VIAL (ML) SUBCUTANEOUS
Refills: 0 | Status: DISCONTINUED | OUTPATIENT
Start: 2024-01-01 | End: 2024-01-01

## 2024-01-01 RX ORDER — IPRATROPIUM/ALBUTEROL SULFATE 18-103MCG
3 AEROSOL WITH ADAPTER (GRAM) INHALATION ONCE
Refills: 0 | Status: COMPLETED | OUTPATIENT
Start: 2024-01-01 | End: 2024-01-01

## 2024-01-01 RX ORDER — HALOBETASOL PROPIONATE 0.5 MG/G
0.05 OINTMENT TOPICAL
Qty: 50 | Refills: 0 | Status: ACTIVE | COMMUNITY
Start: 2024-01-01

## 2024-01-01 RX ORDER — ENOXAPARIN SODIUM 100 MG/ML
90 INJECTION SUBCUTANEOUS EVERY 12 HOURS
Refills: 0 | Status: DISCONTINUED | OUTPATIENT
Start: 2024-01-01 | End: 2024-01-01

## 2024-01-01 RX ORDER — AMIODARONE HYDROCHLORIDE 400 MG/1
400 TABLET ORAL EVERY 8 HOURS
Refills: 0 | Status: DISCONTINUED | OUTPATIENT
Start: 2024-01-01 | End: 2024-01-01

## 2024-01-01 RX ORDER — AMIODARONE HYDROCHLORIDE 400 MG/1
200 TABLET ORAL ONCE
Refills: 0 | Status: COMPLETED | OUTPATIENT
Start: 2024-01-01 | End: 2024-01-01

## 2024-01-01 RX ORDER — SODIUM CHLORIDE 9 MG/ML
1000 INJECTION INTRAMUSCULAR; INTRAVENOUS; SUBCUTANEOUS
Refills: 0 | Status: DISCONTINUED | OUTPATIENT
Start: 2024-01-01 | End: 2024-01-01

## 2024-01-01 RX ORDER — MEPOLIZUMAB 100 MG/ML
100 INJECTION, SOLUTION SUBCUTANEOUS
Refills: 0 | DISCHARGE

## 2024-01-01 RX ORDER — INSULIN GLARGINE 100 [IU]/ML
30 INJECTION, SOLUTION SUBCUTANEOUS AT BEDTIME
Refills: 0 | Status: DISCONTINUED | OUTPATIENT
Start: 2024-01-01 | End: 2024-01-01

## 2024-01-01 RX ORDER — HEPARIN SODIUM 5000 [USP'U]/ML
8000 INJECTION INTRAVENOUS; SUBCUTANEOUS ONCE
Refills: 0 | Status: DISCONTINUED | OUTPATIENT
Start: 2024-01-01 | End: 2024-01-01

## 2024-01-01 RX ORDER — MAGNESIUM SULFATE 500 MG/ML
2 VIAL (ML) INJECTION ONCE
Refills: 0 | Status: COMPLETED | OUTPATIENT
Start: 2024-01-01 | End: 2024-01-01

## 2024-01-01 RX ORDER — TIRZEPATIDE 15 MG/.5ML
5 INJECTION, SOLUTION SUBCUTANEOUS
Refills: 0 | DISCHARGE

## 2024-01-01 RX ORDER — ENOXAPARIN SODIUM 100 MG/ML
90 INJECTION SUBCUTANEOUS EVERY 12 HOURS
Refills: 0 | Status: COMPLETED | OUTPATIENT
Start: 2024-01-01 | End: 2024-01-01

## 2024-01-01 RX ORDER — AZITHROMYCIN 500 MG/1
1 TABLET, FILM COATED ORAL
Qty: 0 | Refills: 0 | DISCHARGE
End: 2023-04-16

## 2024-01-01 RX ORDER — MILK THISTLE 150 MG
1 CAPSULE ORAL
Qty: 0 | Refills: 0 | DISCHARGE

## 2024-01-01 RX ORDER — INSULIN GLARGINE 100 [IU]/ML
100 INJECTION, SOLUTION SUBCUTANEOUS
Qty: 9 | Refills: 0 | Status: ACTIVE | COMMUNITY
Start: 2023-01-01

## 2024-01-01 RX ORDER — INSULIN GLARGINE 100 [IU]/ML
21 INJECTION, SOLUTION SUBCUTANEOUS AT BEDTIME
Refills: 0 | Status: DISCONTINUED | OUTPATIENT
Start: 2024-01-01 | End: 2024-01-01

## 2024-01-01 RX ORDER — TAMSULOSIN HYDROCHLORIDE 0.4 MG/1
1 CAPSULE ORAL
Qty: 0 | Refills: 0 | DISCHARGE

## 2024-01-01 RX ORDER — INSULIN GLARGINE 100 [IU]/ML
10 INJECTION, SOLUTION SUBCUTANEOUS AT BEDTIME
Refills: 0 | Status: DISCONTINUED | OUTPATIENT
Start: 2024-01-01 | End: 2024-01-01

## 2024-01-01 RX ORDER — INSULIN GLARGINE 100 [IU]/ML
6 INJECTION, SOLUTION SUBCUTANEOUS AT BEDTIME
Refills: 0 | Status: DISCONTINUED | OUTPATIENT
Start: 2024-01-01 | End: 2024-01-01

## 2024-01-01 RX ORDER — FUROSEMIDE 20 MG/1
20 TABLET ORAL
Refills: 0 | Status: ACTIVE | COMMUNITY

## 2024-01-01 RX ORDER — HEPARIN SODIUM 5000 [USP'U]/ML
8000 INJECTION INTRAVENOUS; SUBCUTANEOUS EVERY 6 HOURS
Refills: 0 | Status: DISCONTINUED | OUTPATIENT
Start: 2024-01-01 | End: 2024-01-01

## 2024-01-01 RX ORDER — CEFTRIAXONE 500 MG/1
1000 INJECTION, POWDER, FOR SOLUTION INTRAMUSCULAR; INTRAVENOUS ONCE
Refills: 0 | Status: COMPLETED | OUTPATIENT
Start: 2024-01-01 | End: 2024-01-01

## 2024-01-01 RX ORDER — MAGNESIUM OXIDE/MAG AA CHELATE 300 MG
CAPSULE ORAL
Refills: 0 | Status: ACTIVE | COMMUNITY

## 2024-01-01 RX ORDER — DILTIAZEM HCL 120 MG
10 CAPSULE, EXT RELEASE 24 HR ORAL ONCE
Refills: 0 | Status: COMPLETED | OUTPATIENT
Start: 2024-01-01 | End: 2024-01-01

## 2024-01-01 RX ORDER — BUDESONIDE AND FORMOTEROL FUMARATE DIHYDRATE 160; 4.5 UG/1; UG/1
2 AEROSOL RESPIRATORY (INHALATION)
Refills: 0 | Status: DISCONTINUED | OUTPATIENT
Start: 2024-01-01 | End: 2024-01-01

## 2024-01-01 RX ORDER — METOPROLOL TARTRATE 50 MG
5 TABLET ORAL EVERY 6 HOURS
Refills: 0 | Status: DISCONTINUED | OUTPATIENT
Start: 2024-01-01 | End: 2024-01-01

## 2024-01-01 RX ORDER — HYDROMORPHONE HYDROCHLORIDE 2 MG/ML
0.2 INJECTION INTRAMUSCULAR; INTRAVENOUS; SUBCUTANEOUS
Refills: 0 | Status: DISCONTINUED | OUTPATIENT
Start: 2024-01-01 | End: 2024-01-01

## 2024-01-01 RX ORDER — INSULIN GLARGINE 100 [IU]/ML
15 INJECTION, SOLUTION SUBCUTANEOUS AT BEDTIME
Refills: 0 | Status: DISCONTINUED | OUTPATIENT
Start: 2024-01-01 | End: 2024-01-01

## 2024-01-01 RX ORDER — AMIODARONE HYDROCHLORIDE 400 MG/1
1 TABLET ORAL
Qty: 0 | Refills: 0 | DISCHARGE
Start: 2024-01-01

## 2024-01-01 RX ORDER — PANTOPRAZOLE SODIUM 20 MG/1
40 TABLET, DELAYED RELEASE ORAL
Refills: 0 | Status: DISCONTINUED | OUTPATIENT
Start: 2024-01-01 | End: 2024-01-01

## 2024-01-01 RX ORDER — NYSTATIN 500MM UNIT
5 POWDER (EA) MISCELLANEOUS
Qty: 0 | Refills: 0 | DISCHARGE
Start: 2024-01-01

## 2024-01-01 RX ORDER — CIPROFLOXACIN LACTATE 400MG/40ML
400 VIAL (ML) INTRAVENOUS EVERY 12 HOURS
Refills: 0 | Status: DISCONTINUED | OUTPATIENT
Start: 2024-01-01 | End: 2024-01-01

## 2024-01-01 RX ORDER — CHOLECALCIFEROL (VITAMIN D3) 125 MCG
1 CAPSULE ORAL
Refills: 0 | DISCHARGE

## 2024-01-01 RX ORDER — PREGABALIN 225 MG/1
1 CAPSULE ORAL
Refills: 0 | DISCHARGE

## 2024-01-01 RX ORDER — ALBUTEROL 90 UG/1
2.5 AEROSOL, METERED ORAL EVERY 6 HOURS
Refills: 0 | Status: DISCONTINUED | OUTPATIENT
Start: 2024-01-01 | End: 2024-01-01

## 2024-01-01 RX ORDER — PHENYLEPHRINE HYDROCHLORIDE 10 MG/ML
0.2 INJECTION INTRAVENOUS
Qty: 40 | Refills: 0 | Status: DISCONTINUED | OUTPATIENT
Start: 2024-01-01 | End: 2024-01-01

## 2024-01-01 RX ORDER — IPRATROPIUM/ALBUTEROL SULFATE 18-103MCG
3 AEROSOL WITH ADAPTER (GRAM) INHALATION
Qty: 0 | Refills: 0 | DISCHARGE
Start: 2024-01-01

## 2024-01-01 RX ORDER — MAGNESIUM OXIDE 400 MG ORAL TABLET 241.3 MG
1 TABLET ORAL
Refills: 0 | DISCHARGE

## 2024-01-01 RX ORDER — INSULIN GLARGINE 100 [IU]/ML
40 INJECTION, SOLUTION SUBCUTANEOUS AT BEDTIME
Refills: 0 | Status: DISCONTINUED | OUTPATIENT
Start: 2024-01-01 | End: 2024-01-01

## 2024-01-01 RX ORDER — ALBUTEROL 90 UG/1
2.5 AEROSOL, METERED ORAL
Refills: 0 | Status: DISCONTINUED | OUTPATIENT
Start: 2024-01-01 | End: 2024-01-01

## 2024-01-01 RX ORDER — ALBUTEROL 90 UG/1
3 AEROSOL, METERED ORAL
Qty: 0 | Refills: 0 | DISCHARGE

## 2024-01-01 RX ORDER — SODIUM CHLORIDE 9 MG/ML
250 INJECTION, SOLUTION INTRAVENOUS ONCE
Refills: 0 | Status: COMPLETED | OUTPATIENT
Start: 2024-01-01 | End: 2024-01-01

## 2024-01-01 RX ORDER — MONTELUKAST 4 MG/1
10 TABLET, CHEWABLE ORAL AT BEDTIME
Refills: 0 | Status: DISCONTINUED | OUTPATIENT
Start: 2024-01-01 | End: 2024-01-01

## 2024-01-01 RX ORDER — ATORVASTATIN CALCIUM 80 MG/1
1 TABLET, FILM COATED ORAL
Qty: 30 | Refills: 0
Start: 2024-01-01 | End: 2024-06-15

## 2024-01-01 RX ORDER — UMECLIDINIUM 62.5 UG/1
1 AEROSOL, POWDER ORAL
Qty: 0 | Refills: 0 | DISCHARGE

## 2024-01-01 RX ORDER — LOSARTAN POTASSIUM 25 MG/1
25 TABLET, FILM COATED ORAL
Refills: 0 | Status: DISCONTINUED | COMMUNITY
End: 2024-01-01

## 2024-01-01 RX ORDER — ASPIRIN 81 MG
81 TABLET, DELAYED RELEASE (ENTERIC COATED) ORAL
Refills: 0 | Status: ACTIVE | COMMUNITY

## 2024-01-01 RX ORDER — SEMAGLUTIDE 0.68 MG/ML
0 INJECTION, SOLUTION SUBCUTANEOUS
Refills: 0 | DISCHARGE

## 2024-01-01 RX ORDER — FAMOTIDINE 20 MG/1
20 TABLET, FILM COATED ORAL
Qty: 60 | Refills: 0 | Status: COMPLETED | COMMUNITY
Start: 2020-08-03 | End: 2024-01-01

## 2024-01-01 RX ORDER — POTASSIUM PHOSPHATE, MONOBASIC POTASSIUM PHOSPHATE, DIBASIC 236; 224 MG/ML; MG/ML
30 INJECTION, SOLUTION INTRAVENOUS ONCE
Refills: 0 | Status: COMPLETED | OUTPATIENT
Start: 2024-01-01 | End: 2024-01-01

## 2024-01-01 RX ORDER — ASPIRIN/CALCIUM CARB/MAGNESIUM 324 MG
81 TABLET ORAL DAILY
Refills: 0 | Status: DISCONTINUED | OUTPATIENT
Start: 2024-01-01 | End: 2024-01-01

## 2024-01-01 RX ORDER — TAMSULOSIN HYDROCHLORIDE 0.4 MG/1
0.4 CAPSULE ORAL AT BEDTIME
Refills: 0 | Status: DISCONTINUED | OUTPATIENT
Start: 2024-01-01 | End: 2024-01-01

## 2024-01-01 RX ORDER — INSULIN GLARGINE 100 [IU]/ML
10 INJECTION, SOLUTION SUBCUTANEOUS ONCE
Refills: 0 | Status: COMPLETED | OUTPATIENT
Start: 2024-01-01 | End: 2024-01-01

## 2024-01-01 RX ORDER — ROBINUL 0.2 MG/ML
0.2 INJECTION INTRAMUSCULAR; INTRAVENOUS EVERY 6 HOURS
Refills: 0 | Status: DISCONTINUED | OUTPATIENT
Start: 2024-01-01 | End: 2024-01-01

## 2024-01-01 RX ORDER — ROSUVASTATIN CALCIUM 5 MG/1
1 TABLET ORAL
Qty: 0 | Refills: 0 | DISCHARGE

## 2024-01-01 RX ORDER — FLUTICASONE FUROATE AND VILANTEROL 200; 25 UG/1; UG/1
200-25 POWDER RESPIRATORY (INHALATION)
Qty: 1 | Refills: 5 | Status: COMPLETED | COMMUNITY
Start: 2023-04-01 | End: 2024-01-01

## 2024-01-01 RX ORDER — AMIODARONE HYDROCHLORIDE 400 MG/1
1 TABLET ORAL
Qty: 450 | Refills: 0 | Status: DISCONTINUED | OUTPATIENT
Start: 2024-01-01 | End: 2024-01-01

## 2024-01-01 RX ORDER — AMIODARONE HYDROCHLORIDE 400 MG/1
TABLET ORAL
Refills: 0 | Status: DISCONTINUED | OUTPATIENT
Start: 2024-01-01 | End: 2024-01-01

## 2024-01-01 RX ORDER — INSULIN ASPART 100 [IU]/ML
0 INJECTION, SOLUTION SUBCUTANEOUS
Refills: 0 | DISCHARGE

## 2024-01-01 RX ORDER — EMPAGLIFLOZIN 25 MG/1
25 TABLET, FILM COATED ORAL
Refills: 0 | Status: DISCONTINUED | COMMUNITY
End: 2024-01-01

## 2024-01-01 RX ORDER — INSULIN GLARGINE 100 [IU]/ML
15 INJECTION, SOLUTION SUBCUTANEOUS
Qty: 0 | Refills: 0 | DISCHARGE
Start: 2024-01-01

## 2024-01-01 RX ORDER — TIOTROPIUM BROMIDE 18 UG/1
2 CAPSULE ORAL; RESPIRATORY (INHALATION) DAILY
Refills: 0 | Status: DISCONTINUED | OUTPATIENT
Start: 2024-01-01 | End: 2024-01-01

## 2024-01-01 RX ORDER — METFORMIN HYDROCHLORIDE 1000 MG/1
1000 TABLET, COATED ORAL
Refills: 0 | Status: ACTIVE | COMMUNITY

## 2024-01-01 RX ORDER — ATORVASTATIN CALCIUM 80 MG/1
80 TABLET, FILM COATED ORAL AT BEDTIME
Refills: 0 | Status: DISCONTINUED | OUTPATIENT
Start: 2024-01-01 | End: 2024-01-01

## 2024-01-01 RX ORDER — CHOLECALCIFEROL (VITAMIN D3) 125 MCG
1 CAPSULE ORAL
Qty: 0 | Refills: 0 | DISCHARGE

## 2024-01-01 RX ORDER — MIDODRINE HYDROCHLORIDE 2.5 MG/1
5 TABLET ORAL EVERY 8 HOURS
Refills: 0 | Status: DISCONTINUED | OUTPATIENT
Start: 2024-01-01 | End: 2024-01-01

## 2024-01-01 RX ORDER — SEMAGLUTIDE 0.68 MG/ML
INJECTION, SOLUTION SUBCUTANEOUS
Refills: 0 | Status: ACTIVE | COMMUNITY

## 2024-01-01 RX ORDER — ASCORBIC ACID 60 MG
1 TABLET,CHEWABLE ORAL
Refills: 0 | DISCHARGE

## 2024-01-01 RX ORDER — CEFAZOLIN SODIUM 1 G
1000 VIAL (EA) INJECTION EVERY 8 HOURS
Refills: 0 | Status: DISCONTINUED | OUTPATIENT
Start: 2024-01-01 | End: 2024-01-01

## 2024-01-01 RX ORDER — HEPARIN SODIUM 5000 [USP'U]/ML
INJECTION INTRAVENOUS; SUBCUTANEOUS
Qty: 25000 | Refills: 0 | Status: DISCONTINUED | OUTPATIENT
Start: 2024-01-01 | End: 2024-01-01

## 2024-01-01 RX ORDER — MIDODRINE HYDROCHLORIDE 2.5 MG/1
2.5 TABLET ORAL EVERY 8 HOURS
Refills: 0 | Status: DISCONTINUED | OUTPATIENT
Start: 2024-01-01 | End: 2024-01-01

## 2024-01-01 RX ORDER — FLUTICASONE PROPIONATE 50 UG/1
50 SPRAY, METERED NASAL TWICE DAILY
Qty: 48 | Refills: 3 | Status: ACTIVE | COMMUNITY
Start: 2019-12-16 | End: 1900-01-01

## 2024-01-01 RX ORDER — INSULIN GLARGINE 100 [IU]/ML
36 INJECTION, SOLUTION SUBCUTANEOUS AT BEDTIME
Refills: 0 | Status: DISCONTINUED | OUTPATIENT
Start: 2024-01-01 | End: 2024-01-01

## 2024-01-01 RX ORDER — MEPOLIZUMAB 100 MG/ML
100 INJECTION, SOLUTION SUBCUTANEOUS ONCE
Refills: 0 | Status: COMPLETED | OUTPATIENT
Start: 2024-01-01 | End: 2024-01-01

## 2024-01-01 RX ORDER — POTASSIUM CHLORIDE 20 MEQ
40 PACKET (EA) ORAL EVERY 4 HOURS
Refills: 0 | Status: COMPLETED | OUTPATIENT
Start: 2024-01-01 | End: 2024-01-01

## 2024-01-01 RX ORDER — CEFAZOLIN SODIUM 1 G
2000 VIAL (EA) INJECTION ONCE
Refills: 0 | Status: DISCONTINUED | OUTPATIENT
Start: 2024-01-01 | End: 2024-01-01

## 2024-01-01 RX ORDER — MONTELUKAST 10 MG/1
10 TABLET, FILM COATED ORAL
Qty: 90 | Refills: 3 | Status: COMPLETED | COMMUNITY
Start: 2019-12-16 | End: 2024-01-01

## 2024-01-01 RX ORDER — NYSTATIN 500MM UNIT
500000 POWDER (EA) MISCELLANEOUS THREE TIMES A DAY
Refills: 0 | Status: DISCONTINUED | OUTPATIENT
Start: 2024-01-01 | End: 2024-01-01

## 2024-01-01 RX ORDER — FLUTICASONE FUROATE AND VILANTEROL TRIFENATATE 100; 25 UG/1; UG/1
1 POWDER RESPIRATORY (INHALATION)
Refills: 0 | DISCHARGE

## 2024-01-01 RX ORDER — ACETAMINOPHEN 500 MG
2 TABLET ORAL
Refills: 0 | DISCHARGE

## 2024-01-01 RX ORDER — HEPARIN SODIUM 5000 [USP'U]/ML
4000 INJECTION INTRAVENOUS; SUBCUTANEOUS EVERY 6 HOURS
Refills: 0 | Status: DISCONTINUED | OUTPATIENT
Start: 2024-01-01 | End: 2024-01-01

## 2024-01-01 RX ORDER — AMIODARONE HYDROCHLORIDE 400 MG/1
150 TABLET ORAL ONCE
Refills: 0 | Status: COMPLETED | OUTPATIENT
Start: 2024-01-01 | End: 2024-01-01

## 2024-01-01 RX ORDER — DEXAMETHASONE 0.5 MG/5ML
10 ELIXIR ORAL ONCE
Refills: 0 | Status: COMPLETED | OUTPATIENT
Start: 2024-01-01 | End: 2024-01-01

## 2024-01-01 RX ORDER — CIPROFLOXACIN LACTATE 400MG/40ML
500 VIAL (ML) INTRAVENOUS
Refills: 0 | Status: DISCONTINUED | OUTPATIENT
Start: 2024-01-01 | End: 2024-01-01

## 2024-01-01 RX ORDER — SILVER SULFADIAZINE 10 G/1000G
1 CREAM TOPICAL
Qty: 85 | Refills: 0 | Status: ACTIVE | COMMUNITY
Start: 2024-01-01

## 2024-01-01 RX ORDER — MAGNESIUM OXIDE 400 MG ORAL TABLET 241.3 MG
400 TABLET ORAL DAILY
Refills: 0 | Status: DISCONTINUED | OUTPATIENT
Start: 2024-01-01 | End: 2024-01-01

## 2024-01-01 RX ORDER — PIPERACILLIN AND TAZOBACTAM 4; .5 G/20ML; G/20ML
3.38 INJECTION, POWDER, LYOPHILIZED, FOR SOLUTION INTRAVENOUS EVERY 8 HOURS
Refills: 0 | Status: DISCONTINUED | OUTPATIENT
Start: 2024-01-01 | End: 2024-01-01

## 2024-01-01 RX ORDER — ALBUTEROL 90 UG/1
5 AEROSOL, METERED ORAL
Qty: 100 | Refills: 0 | Status: DISCONTINUED | OUTPATIENT
Start: 2024-01-01 | End: 2024-01-01

## 2024-01-01 RX ORDER — VANCOMYCIN HCL 1 G
1500 VIAL (EA) INTRAVENOUS EVERY 12 HOURS
Refills: 0 | Status: COMPLETED | OUTPATIENT
Start: 2024-01-01 | End: 2024-01-01

## 2024-01-01 RX ORDER — POTASSIUM PHOSPHATE, MONOBASIC POTASSIUM PHOSPHATE, DIBASIC 236; 224 MG/ML; MG/ML
15 INJECTION, SOLUTION INTRAVENOUS ONCE
Refills: 0 | Status: COMPLETED | OUTPATIENT
Start: 2024-01-01 | End: 2024-01-01

## 2024-01-01 RX ORDER — UMECLIDINIUM 62.5 UG/1
62.5 AEROSOL, POWDER ORAL
Qty: 1 | Refills: 5 | Status: ACTIVE | COMMUNITY
Start: 2019-08-25 | End: 1900-01-01

## 2024-01-01 RX ORDER — CLOPIDOGREL 75 MG/1
75 TABLET, FILM COATED ORAL
Refills: 0 | Status: COMPLETED | COMMUNITY
End: 2024-01-01

## 2024-01-01 RX ORDER — SODIUM,POTASSIUM PHOSPHATES 278-250MG
1 POWDER IN PACKET (EA) ORAL ONCE
Refills: 0 | Status: COMPLETED | OUTPATIENT
Start: 2024-01-01 | End: 2024-01-01

## 2024-01-01 RX ORDER — METOPROLOL TARTRATE 50 MG
50 TABLET ORAL EVERY 8 HOURS
Refills: 0 | Status: DISCONTINUED | OUTPATIENT
Start: 2024-01-01 | End: 2024-01-01

## 2024-01-01 RX ORDER — EMPAGLIFLOZIN 10 MG/1
1 TABLET, FILM COATED ORAL
Qty: 0 | Refills: 0 | DISCHARGE

## 2024-01-01 RX ORDER — ASPIRIN/CALCIUM CARB/MAGNESIUM 324 MG
0 TABLET ORAL
Refills: 0 | DISCHARGE

## 2024-01-01 RX ORDER — AZELASTINE HYDROCHLORIDE 137 UG/1
0.1 SPRAY, METERED NASAL TWICE DAILY
Qty: 1 | Refills: 5 | Status: ACTIVE | COMMUNITY
Start: 2022-03-01 | End: 1900-01-01

## 2024-01-01 RX ORDER — OMEGA-3 ACID ETHYL ESTERS 1 G
1 CAPSULE ORAL
Refills: 0 | DISCHARGE

## 2024-01-01 RX ORDER — DRONABINOL 2.5 MG
2.5 CAPSULE ORAL
Refills: 0 | Status: DISCONTINUED | OUTPATIENT
Start: 2024-01-01 | End: 2024-01-01

## 2024-01-01 RX ADMIN — Medication 650 MILLIGRAM(S): at 15:23

## 2024-01-01 RX ADMIN — Medication 3 MILLILITER(S): at 19:22

## 2024-01-01 RX ADMIN — Medication 500000 UNIT(S): at 14:58

## 2024-01-01 RX ADMIN — HYDROMORPHONE HYDROCHLORIDE 0.5 MILLIGRAM(S): 2 INJECTION INTRAMUSCULAR; INTRAVENOUS; SUBCUTANEOUS at 03:23

## 2024-01-01 RX ADMIN — APIXABAN 2.5 MILLIGRAM(S): 2.5 TABLET, FILM COATED ORAL at 09:27

## 2024-01-01 RX ADMIN — Medication 8: at 18:08

## 2024-01-01 RX ADMIN — Medication 100 MILLIEQUIVALENT(S): at 11:36

## 2024-01-01 RX ADMIN — Medication 25 MILLIGRAM(S): at 05:52

## 2024-01-01 RX ADMIN — AMIODARONE HYDROCHLORIDE 200 MILLIGRAM(S): 400 TABLET ORAL at 05:42

## 2024-01-01 RX ADMIN — Medication 500000 UNIT(S): at 13:22

## 2024-01-01 RX ADMIN — PHENYLEPHRINE HYDROCHLORIDE 7.58 MICROGRAM(S)/KG/MIN: 10 INJECTION INTRAVENOUS at 00:08

## 2024-01-01 RX ADMIN — HEPARIN SODIUM 1000 UNIT(S)/HR: 5000 INJECTION INTRAVENOUS; SUBCUTANEOUS at 07:26

## 2024-01-01 RX ADMIN — Medication 500 MILLIGRAM(S): at 05:53

## 2024-01-01 RX ADMIN — SODIUM CHLORIDE 75 MILLILITER(S): 9 INJECTION, SOLUTION INTRAVENOUS at 08:43

## 2024-01-01 RX ADMIN — MAGNESIUM OXIDE 400 MG ORAL TABLET 400 MILLIGRAM(S): 241.3 TABLET ORAL at 11:37

## 2024-01-01 RX ADMIN — Medication 500 MILLIGRAM(S): at 17:46

## 2024-01-01 RX ADMIN — Medication 0.5 MILLIGRAM(S): at 07:23

## 2024-01-01 RX ADMIN — Medication 2: at 12:34

## 2024-01-01 RX ADMIN — AMIODARONE HYDROCHLORIDE 400 MILLIGRAM(S): 400 TABLET ORAL at 05:24

## 2024-01-01 RX ADMIN — Medication 3 MILLILITER(S): at 13:53

## 2024-01-01 RX ADMIN — AMIODARONE HYDROCHLORIDE 200 MILLIGRAM(S): 400 TABLET ORAL at 05:41

## 2024-01-01 RX ADMIN — Medication 3 MILLILITER(S): at 01:05

## 2024-01-01 RX ADMIN — Medication 1: at 17:12

## 2024-01-01 RX ADMIN — PIPERACILLIN AND TAZOBACTAM 25 GRAM(S): 4; .5 INJECTION, POWDER, LYOPHILIZED, FOR SOLUTION INTRAVENOUS at 14:29

## 2024-01-01 RX ADMIN — Medication 500000 UNIT(S): at 05:07

## 2024-01-01 RX ADMIN — Medication 300 MILLIGRAM(S): at 11:36

## 2024-01-01 RX ADMIN — Medication 3 MILLILITER(S): at 13:32

## 2024-01-01 RX ADMIN — Medication 8: at 21:52

## 2024-01-01 RX ADMIN — Medication 500000 UNIT(S): at 05:42

## 2024-01-01 RX ADMIN — Medication 200 MILLIGRAM(S): at 18:22

## 2024-01-01 RX ADMIN — Medication 6: at 23:08

## 2024-01-01 RX ADMIN — Medication 3 MILLILITER(S): at 01:42

## 2024-01-01 RX ADMIN — HYDROMORPHONE HYDROCHLORIDE 0.5 MILLIGRAM(S): 2 INJECTION INTRAMUSCULAR; INTRAVENOUS; SUBCUTANEOUS at 07:00

## 2024-01-01 RX ADMIN — Medication 500000 UNIT(S): at 06:34

## 2024-01-01 RX ADMIN — Medication 0.5 MILLIGRAM(S): at 07:46

## 2024-01-01 RX ADMIN — ZINC SULFATE TAB 220 MG (50 MG ZINC EQUIVALENT) 220 MILLIGRAM(S): 220 (50 ZN) TAB at 11:38

## 2024-01-01 RX ADMIN — Medication 0.5 MILLIGRAM(S): at 20:04

## 2024-01-01 RX ADMIN — ATORVASTATIN CALCIUM 80 MILLIGRAM(S): 80 TABLET, FILM COATED ORAL at 21:59

## 2024-01-01 RX ADMIN — APIXABAN 2.5 MILLIGRAM(S): 2.5 TABLET, FILM COATED ORAL at 09:39

## 2024-01-01 RX ADMIN — Medication 500000 UNIT(S): at 14:59

## 2024-01-01 RX ADMIN — APIXABAN 2.5 MILLIGRAM(S): 2.5 TABLET, FILM COATED ORAL at 21:25

## 2024-01-01 RX ADMIN — Medication 2: at 18:04

## 2024-01-01 RX ADMIN — Medication 6: at 22:02

## 2024-01-01 RX ADMIN — Medication 2: at 08:14

## 2024-01-01 RX ADMIN — PHENYLEPHRINE HYDROCHLORIDE 7.58 MICROGRAM(S)/KG/MIN: 10 INJECTION INTRAVENOUS at 02:12

## 2024-01-01 RX ADMIN — Medication 650 MILLIGRAM(S): at 18:57

## 2024-01-01 RX ADMIN — Medication 650 MILLIGRAM(S): at 13:12

## 2024-01-01 RX ADMIN — Medication 650 MILLIGRAM(S): at 19:00

## 2024-01-01 RX ADMIN — HEPARIN SODIUM 5000 UNIT(S): 5000 INJECTION INTRAVENOUS; SUBCUTANEOUS at 05:07

## 2024-01-01 RX ADMIN — ALBUTEROL 2 PUFF(S): 90 AEROSOL, METERED ORAL at 06:49

## 2024-01-01 RX ADMIN — BUDESONIDE AND FORMOTEROL FUMARATE DIHYDRATE 2 PUFF(S): 160; 4.5 AEROSOL RESPIRATORY (INHALATION) at 21:52

## 2024-01-01 RX ADMIN — AMIODARONE HYDROCHLORIDE 400 MILLIGRAM(S): 400 TABLET ORAL at 13:03

## 2024-01-01 RX ADMIN — Medication 4: at 08:46

## 2024-01-01 RX ADMIN — Medication 2: at 00:16

## 2024-01-01 RX ADMIN — Medication 2: at 17:26

## 2024-01-01 RX ADMIN — SODIUM CHLORIDE 75 MILLILITER(S): 9 INJECTION INTRAMUSCULAR; INTRAVENOUS; SUBCUTANEOUS at 18:01

## 2024-01-01 RX ADMIN — Medication 650 MILLIGRAM(S): at 00:18

## 2024-01-01 RX ADMIN — APIXABAN 2.5 MILLIGRAM(S): 2.5 TABLET, FILM COATED ORAL at 18:58

## 2024-01-01 RX ADMIN — Medication 500 MILLIGRAM(S): at 06:47

## 2024-01-01 RX ADMIN — Medication 3 MILLILITER(S): at 00:31

## 2024-01-01 RX ADMIN — Medication 1: at 23:30

## 2024-01-01 RX ADMIN — Medication 2: at 09:24

## 2024-01-01 RX ADMIN — Medication 500 MILLIGRAM(S): at 17:56

## 2024-01-01 RX ADMIN — Medication 300 MILLIGRAM(S): at 05:10

## 2024-01-01 RX ADMIN — ATORVASTATIN CALCIUM 80 MILLIGRAM(S): 80 TABLET, FILM COATED ORAL at 21:52

## 2024-01-01 RX ADMIN — Medication 650 MILLIGRAM(S): at 13:24

## 2024-01-01 RX ADMIN — ENOXAPARIN SODIUM 100 MILLIGRAM(S): 100 INJECTION SUBCUTANEOUS at 05:15

## 2024-01-01 RX ADMIN — MONTELUKAST 10 MILLIGRAM(S): 4 TABLET, CHEWABLE ORAL at 21:52

## 2024-01-01 RX ADMIN — AMIODARONE HYDROCHLORIDE 200 MILLIGRAM(S): 400 TABLET ORAL at 05:34

## 2024-01-01 RX ADMIN — Medication 0.5 MILLIGRAM(S): at 19:24

## 2024-01-01 RX ADMIN — Medication 81 MILLIGRAM(S): at 11:12

## 2024-01-01 RX ADMIN — ATORVASTATIN CALCIUM 80 MILLIGRAM(S): 80 TABLET, FILM COATED ORAL at 21:25

## 2024-01-01 RX ADMIN — Medication 1: at 05:23

## 2024-01-01 RX ADMIN — MONTELUKAST 10 MILLIGRAM(S): 4 TABLET, CHEWABLE ORAL at 12:12

## 2024-01-01 RX ADMIN — PANTOPRAZOLE SODIUM 40 MILLIGRAM(S): 20 TABLET, DELAYED RELEASE ORAL at 11:20

## 2024-01-01 RX ADMIN — Medication 500 MILLIGRAM(S): at 05:42

## 2024-01-01 RX ADMIN — Medication 10: at 00:43

## 2024-01-01 RX ADMIN — Medication 0.5 MILLIGRAM(S): at 19:43

## 2024-01-01 RX ADMIN — Medication 81 MILLIGRAM(S): at 12:12

## 2024-01-01 RX ADMIN — TAMSULOSIN HYDROCHLORIDE 0.4 MILLIGRAM(S): 0.4 CAPSULE ORAL at 21:19

## 2024-01-01 RX ADMIN — Medication 200 MILLIGRAM(S): at 05:14

## 2024-01-01 RX ADMIN — Medication 2: at 17:06

## 2024-01-01 RX ADMIN — Medication 500000 UNIT(S): at 13:42

## 2024-01-01 RX ADMIN — Medication 500000 UNIT(S): at 21:23

## 2024-01-01 RX ADMIN — Medication 0.5 MILLIGRAM(S): at 20:37

## 2024-01-01 RX ADMIN — Medication 650 MILLIGRAM(S): at 18:10

## 2024-01-01 RX ADMIN — APIXABAN 2.5 MILLIGRAM(S): 2.5 TABLET, FILM COATED ORAL at 05:27

## 2024-01-01 RX ADMIN — Medication 3 MILLILITER(S): at 00:59

## 2024-01-01 RX ADMIN — PHENYLEPHRINE HYDROCHLORIDE 7.58 MICROGRAM(S)/KG/MIN: 10 INJECTION INTRAVENOUS at 10:55

## 2024-01-01 RX ADMIN — SODIUM CHLORIDE 50 MILLILITER(S): 9 INJECTION, SOLUTION INTRAVENOUS at 12:43

## 2024-01-01 RX ADMIN — ZINC SULFATE TAB 220 MG (50 MG ZINC EQUIVALENT) 220 MILLIGRAM(S): 220 (50 ZN) TAB at 13:12

## 2024-01-01 RX ADMIN — AMIODARONE HYDROCHLORIDE 400 MILLIGRAM(S): 400 TABLET ORAL at 05:17

## 2024-01-01 RX ADMIN — Medication 3 MILLILITER(S): at 07:35

## 2024-01-01 RX ADMIN — PIPERACILLIN AND TAZOBACTAM 25 GRAM(S): 4; .5 INJECTION, POWDER, LYOPHILIZED, FOR SOLUTION INTRAVENOUS at 22:20

## 2024-01-01 RX ADMIN — Medication 2: at 12:58

## 2024-01-01 RX ADMIN — Medication 81 MILLIGRAM(S): at 11:58

## 2024-01-01 RX ADMIN — SODIUM CHLORIDE 100 MILLILITER(S): 9 INJECTION, SOLUTION INTRAVENOUS at 23:36

## 2024-01-01 RX ADMIN — Medication 3 MILLILITER(S): at 20:03

## 2024-01-01 RX ADMIN — Medication 3 MILLILITER(S): at 19:14

## 2024-01-01 RX ADMIN — Medication 25 MILLIGRAM(S): at 17:56

## 2024-01-01 RX ADMIN — Medication 500 MILLIGRAM(S): at 17:28

## 2024-01-01 RX ADMIN — Medication 500000 UNIT(S): at 22:15

## 2024-01-01 RX ADMIN — Medication 8: at 13:23

## 2024-01-01 RX ADMIN — Medication 25 MILLIGRAM(S): at 05:42

## 2024-01-01 RX ADMIN — Medication 3 MILLILITER(S): at 13:59

## 2024-01-01 RX ADMIN — INSULIN GLARGINE 15 UNIT(S): 100 INJECTION, SOLUTION SUBCUTANEOUS at 21:58

## 2024-01-01 RX ADMIN — Medication 3 MILLILITER(S): at 00:35

## 2024-01-01 RX ADMIN — Medication 25 MILLIGRAM(S): at 05:09

## 2024-01-01 RX ADMIN — TIOTROPIUM BROMIDE 2 PUFF(S): 18 CAPSULE ORAL; RESPIRATORY (INHALATION) at 06:52

## 2024-01-01 RX ADMIN — HYDROMORPHONE HYDROCHLORIDE 0.5 MILLIGRAM(S): 2 INJECTION INTRAMUSCULAR; INTRAVENOUS; SUBCUTANEOUS at 02:22

## 2024-01-01 RX ADMIN — Medication 500 MILLIGRAM(S): at 17:53

## 2024-01-01 RX ADMIN — AMIODARONE HYDROCHLORIDE 600 MILLIGRAM(S): 400 TABLET ORAL at 17:12

## 2024-01-01 RX ADMIN — Medication 500000 UNIT(S): at 13:28

## 2024-01-01 RX ADMIN — Medication 50 MILLIGRAM(S): at 14:42

## 2024-01-01 RX ADMIN — Medication 650 MILLIGRAM(S): at 11:37

## 2024-01-01 RX ADMIN — INSULIN GLARGINE 6 UNIT(S): 100 INJECTION, SOLUTION SUBCUTANEOUS at 21:20

## 2024-01-01 RX ADMIN — Medication 2: at 23:05

## 2024-01-01 RX ADMIN — Medication 3 MILLILITER(S): at 07:23

## 2024-01-01 RX ADMIN — Medication 2: at 08:50

## 2024-01-01 RX ADMIN — Medication 1: at 12:46

## 2024-01-01 RX ADMIN — ACETAZOLAMIDE 250 MILLIGRAM(S): 250 TABLET ORAL at 05:27

## 2024-01-01 RX ADMIN — Medication 650 MILLIGRAM(S): at 05:34

## 2024-01-01 RX ADMIN — Medication 3 MILLILITER(S): at 13:29

## 2024-01-01 RX ADMIN — AMIODARONE HYDROCHLORIDE 200 MILLIGRAM(S): 400 TABLET ORAL at 05:24

## 2024-01-01 RX ADMIN — APIXABAN 2.5 MILLIGRAM(S): 2.5 TABLET, FILM COATED ORAL at 10:08

## 2024-01-01 RX ADMIN — ZINC SULFATE TAB 220 MG (50 MG ZINC EQUIVALENT) 220 MILLIGRAM(S): 220 (50 ZN) TAB at 13:25

## 2024-01-01 RX ADMIN — INSULIN GLARGINE 21 UNIT(S): 100 INJECTION, SOLUTION SUBCUTANEOUS at 22:04

## 2024-01-01 RX ADMIN — MIDODRINE HYDROCHLORIDE 10 MILLIGRAM(S): 2.5 TABLET ORAL at 22:13

## 2024-01-01 RX ADMIN — Medication 0.5 MILLIGRAM(S): at 07:24

## 2024-01-01 RX ADMIN — Medication 500 MILLIGRAM(S): at 06:10

## 2024-01-01 RX ADMIN — Medication 40 MILLIEQUIVALENT(S): at 18:30

## 2024-01-01 RX ADMIN — Medication 250 MILLIGRAM(S): at 05:40

## 2024-01-01 RX ADMIN — Medication 6: at 18:09

## 2024-01-01 RX ADMIN — PANTOPRAZOLE SODIUM 40 MILLIGRAM(S): 20 TABLET, DELAYED RELEASE ORAL at 11:41

## 2024-01-01 RX ADMIN — Medication 2.5 MILLIGRAM(S): at 06:47

## 2024-01-01 RX ADMIN — Medication 0.5 MILLIGRAM(S): at 07:42

## 2024-01-01 RX ADMIN — Medication 3 MILLILITER(S): at 01:55

## 2024-01-01 RX ADMIN — Medication 10 MILLIGRAM(S): at 10:39

## 2024-01-01 RX ADMIN — Medication 25 MILLIGRAM(S): at 17:46

## 2024-01-01 RX ADMIN — ENOXAPARIN SODIUM 90 MILLIGRAM(S): 100 INJECTION SUBCUTANEOUS at 17:26

## 2024-01-01 RX ADMIN — MONTELUKAST 10 MILLIGRAM(S): 4 TABLET, CHEWABLE ORAL at 12:44

## 2024-01-01 RX ADMIN — HEPARIN SODIUM 5000 UNIT(S): 5000 INJECTION INTRAVENOUS; SUBCUTANEOUS at 15:01

## 2024-01-01 RX ADMIN — Medication 0.5 MILLIGRAM(S): at 00:51

## 2024-01-01 RX ADMIN — Medication 1 PACKET(S): at 08:47

## 2024-01-01 RX ADMIN — Medication 3 MILLILITER(S): at 13:15

## 2024-01-01 RX ADMIN — HEPARIN SODIUM 5000 UNIT(S): 5000 INJECTION INTRAVENOUS; SUBCUTANEOUS at 21:47

## 2024-01-01 RX ADMIN — Medication 2: at 11:32

## 2024-01-01 RX ADMIN — Medication 2: at 12:39

## 2024-01-01 RX ADMIN — ATORVASTATIN CALCIUM 80 MILLIGRAM(S): 80 TABLET, FILM COATED ORAL at 22:16

## 2024-01-01 RX ADMIN — APIXABAN 2.5 MILLIGRAM(S): 2.5 TABLET, FILM COATED ORAL at 17:48

## 2024-01-01 RX ADMIN — Medication 200 MILLIGRAM(S): at 05:19

## 2024-01-01 RX ADMIN — Medication 0.5 MILLIGRAM(S): at 08:44

## 2024-01-01 RX ADMIN — AMIODARONE HYDROCHLORIDE 16.7 MG/MIN: 400 TABLET ORAL at 11:46

## 2024-01-01 RX ADMIN — Medication 3 MILLILITER(S): at 01:03

## 2024-01-01 RX ADMIN — Medication 500 MILLIGRAM(S): at 18:03

## 2024-01-01 RX ADMIN — Medication 50 MILLIGRAM(S): at 21:19

## 2024-01-01 RX ADMIN — SODIUM CHLORIDE 50 MILLILITER(S): 9 INJECTION, SOLUTION INTRAVENOUS at 14:55

## 2024-01-01 RX ADMIN — MONTELUKAST 10 MILLIGRAM(S): 4 TABLET, CHEWABLE ORAL at 11:37

## 2024-01-01 RX ADMIN — Medication 0.5 MILLIGRAM(S): at 19:22

## 2024-01-01 RX ADMIN — Medication 0.5 MILLIGRAM(S): at 20:03

## 2024-01-01 RX ADMIN — Medication 2: at 17:46

## 2024-01-01 RX ADMIN — Medication 25 MILLIGRAM(S): at 17:28

## 2024-01-01 RX ADMIN — ENOXAPARIN SODIUM 90 MILLIGRAM(S): 100 INJECTION SUBCUTANEOUS at 17:56

## 2024-01-01 RX ADMIN — Medication 4: at 17:00

## 2024-01-01 RX ADMIN — Medication 0.5 MILLIGRAM(S): at 19:54

## 2024-01-01 RX ADMIN — Medication 25 MILLIGRAM(S): at 06:22

## 2024-01-01 RX ADMIN — AMIODARONE HYDROCHLORIDE 200 MILLIGRAM(S): 400 TABLET ORAL at 06:10

## 2024-01-01 RX ADMIN — Medication 3 MILLILITER(S): at 13:20

## 2024-01-01 RX ADMIN — Medication 2.5 MILLIGRAM(S): at 17:07

## 2024-01-01 RX ADMIN — Medication 25 MILLIGRAM(S): at 17:53

## 2024-01-01 RX ADMIN — Medication 3 MILLILITER(S): at 19:54

## 2024-01-01 RX ADMIN — Medication 0.5 MILLIGRAM(S): at 19:28

## 2024-01-01 RX ADMIN — ATORVASTATIN CALCIUM 80 MILLIGRAM(S): 80 TABLET, FILM COATED ORAL at 21:00

## 2024-01-01 RX ADMIN — Medication 1: at 05:07

## 2024-01-01 RX ADMIN — HYDROMORPHONE HYDROCHLORIDE 0.5 MILLIGRAM(S): 2 INJECTION INTRAMUSCULAR; INTRAVENOUS; SUBCUTANEOUS at 19:37

## 2024-01-01 RX ADMIN — APIXABAN 2.5 MILLIGRAM(S): 2.5 TABLET, FILM COATED ORAL at 05:24

## 2024-01-01 RX ADMIN — Medication 4: at 23:32

## 2024-01-01 RX ADMIN — PIPERACILLIN AND TAZOBACTAM 200 GRAM(S): 4; .5 INJECTION, POWDER, LYOPHILIZED, FOR SOLUTION INTRAVENOUS at 13:25

## 2024-01-01 RX ADMIN — MONTELUKAST 10 MILLIGRAM(S): 4 TABLET, CHEWABLE ORAL at 11:00

## 2024-01-01 RX ADMIN — Medication 3 MILLILITER(S): at 07:54

## 2024-01-01 RX ADMIN — ATORVASTATIN CALCIUM 80 MILLIGRAM(S): 80 TABLET, FILM COATED ORAL at 21:23

## 2024-01-01 RX ADMIN — PHENYLEPHRINE HYDROCHLORIDE 7.58 MICROGRAM(S)/KG/MIN: 10 INJECTION INTRAVENOUS at 03:52

## 2024-01-01 RX ADMIN — Medication 500000 UNIT(S): at 21:15

## 2024-01-01 RX ADMIN — Medication 0.5 MILLIGRAM(S): at 06:01

## 2024-01-01 RX ADMIN — ALBUTEROL 2 PUFF(S): 90 AEROSOL, METERED ORAL at 19:01

## 2024-01-01 RX ADMIN — Medication 25 MILLIGRAM(S): at 06:11

## 2024-01-01 RX ADMIN — Medication 650 MILLIGRAM(S): at 19:10

## 2024-01-01 RX ADMIN — MONTELUKAST 10 MILLIGRAM(S): 4 TABLET, CHEWABLE ORAL at 11:42

## 2024-01-01 RX ADMIN — Medication 2: at 08:28

## 2024-01-01 RX ADMIN — Medication 200 MILLIGRAM(S): at 17:46

## 2024-01-01 RX ADMIN — Medication 3 MILLILITER(S): at 08:44

## 2024-01-01 RX ADMIN — BUDESONIDE AND FORMOTEROL FUMARATE DIHYDRATE 2 PUFF(S): 160; 4.5 AEROSOL RESPIRATORY (INHALATION) at 06:49

## 2024-01-01 RX ADMIN — SODIUM CHLORIDE 1000 MILLILITER(S): 9 INJECTION, SOLUTION INTRAVENOUS at 20:30

## 2024-01-01 RX ADMIN — Medication 200 MILLIGRAM(S): at 05:16

## 2024-01-01 RX ADMIN — Medication 3 MILLILITER(S): at 07:18

## 2024-01-01 RX ADMIN — PHENYLEPHRINE HYDROCHLORIDE 7.58 MICROGRAM(S)/KG/MIN: 10 INJECTION INTRAVENOUS at 23:06

## 2024-01-01 RX ADMIN — Medication 100 MILLIEQUIVALENT(S): at 12:58

## 2024-01-01 RX ADMIN — Medication 3 MILLILITER(S): at 14:07

## 2024-01-01 RX ADMIN — Medication 650 MILLIGRAM(S): at 06:16

## 2024-01-01 RX ADMIN — Medication 0.5 MILLIGRAM(S): at 07:52

## 2024-01-01 RX ADMIN — HYDROMORPHONE HYDROCHLORIDE 0.5 MILLIGRAM(S): 2 INJECTION INTRAMUSCULAR; INTRAVENOUS; SUBCUTANEOUS at 18:37

## 2024-01-01 RX ADMIN — HEPARIN SODIUM 1200 UNIT(S)/HR: 5000 INJECTION INTRAVENOUS; SUBCUTANEOUS at 16:31

## 2024-01-01 RX ADMIN — AMIODARONE HYDROCHLORIDE 400 MILLIGRAM(S): 400 TABLET ORAL at 21:35

## 2024-01-01 RX ADMIN — Medication 81 MILLIGRAM(S): at 11:41

## 2024-01-01 RX ADMIN — INSULIN GLARGINE 15 UNIT(S): 100 INJECTION, SOLUTION SUBCUTANEOUS at 22:27

## 2024-01-01 RX ADMIN — INSULIN GLARGINE 15 UNIT(S): 100 INJECTION, SOLUTION SUBCUTANEOUS at 22:20

## 2024-01-01 RX ADMIN — HYDROMORPHONE HYDROCHLORIDE 0.5 MILLIGRAM(S): 2 INJECTION INTRAMUSCULAR; INTRAVENOUS; SUBCUTANEOUS at 14:40

## 2024-01-01 RX ADMIN — Medication 81 MILLIGRAM(S): at 11:38

## 2024-01-01 RX ADMIN — PHENYLEPHRINE HYDROCHLORIDE 7.58 MICROGRAM(S)/KG/MIN: 10 INJECTION INTRAVENOUS at 02:09

## 2024-01-01 RX ADMIN — ATORVASTATIN CALCIUM 80 MILLIGRAM(S): 80 TABLET, FILM COATED ORAL at 21:20

## 2024-01-01 RX ADMIN — Medication 25 MILLIGRAM(S): at 17:26

## 2024-01-01 RX ADMIN — INSULIN GLARGINE 30 UNIT(S): 100 INJECTION, SOLUTION SUBCUTANEOUS at 21:51

## 2024-01-01 RX ADMIN — Medication 650 MILLIGRAM(S): at 20:00

## 2024-01-01 RX ADMIN — Medication 0.5 MILLIGRAM(S): at 07:56

## 2024-01-01 RX ADMIN — Medication 200 MILLIGRAM(S): at 05:24

## 2024-01-01 RX ADMIN — ATORVASTATIN CALCIUM 80 MILLIGRAM(S): 80 TABLET, FILM COATED ORAL at 21:06

## 2024-01-01 RX ADMIN — ATORVASTATIN CALCIUM 80 MILLIGRAM(S): 80 TABLET, FILM COATED ORAL at 21:58

## 2024-01-01 RX ADMIN — HYDROMORPHONE HYDROCHLORIDE 0.5 MILLIGRAM(S): 2 INJECTION INTRAMUSCULAR; INTRAVENOUS; SUBCUTANEOUS at 00:23

## 2024-01-01 RX ADMIN — BUDESONIDE AND FORMOTEROL FUMARATE DIHYDRATE 2 PUFF(S): 160; 4.5 AEROSOL RESPIRATORY (INHALATION) at 06:52

## 2024-01-01 RX ADMIN — Medication 500 MILLIGRAM(S): at 05:41

## 2024-01-01 RX ADMIN — Medication 1: at 17:22

## 2024-01-01 RX ADMIN — MIDODRINE HYDROCHLORIDE 10 MILLIGRAM(S): 2.5 TABLET ORAL at 21:00

## 2024-01-01 RX ADMIN — MONTELUKAST 10 MILLIGRAM(S): 4 TABLET, CHEWABLE ORAL at 11:41

## 2024-01-01 RX ADMIN — Medication 8: at 19:05

## 2024-01-01 RX ADMIN — Medication 3 MILLILITER(S): at 20:02

## 2024-01-01 RX ADMIN — APIXABAN 2.5 MILLIGRAM(S): 2.5 TABLET, FILM COATED ORAL at 09:05

## 2024-01-01 RX ADMIN — HEPARIN SODIUM 1000 UNIT(S)/HR: 5000 INJECTION INTRAVENOUS; SUBCUTANEOUS at 23:53

## 2024-01-01 RX ADMIN — MONTELUKAST 10 MILLIGRAM(S): 4 TABLET, CHEWABLE ORAL at 22:04

## 2024-01-01 RX ADMIN — PIPERACILLIN AND TAZOBACTAM 25 GRAM(S): 4; .5 INJECTION, POWDER, LYOPHILIZED, FOR SOLUTION INTRAVENOUS at 14:48

## 2024-01-01 RX ADMIN — ALBUTEROL 2 PUFF(S): 90 AEROSOL, METERED ORAL at 21:53

## 2024-01-01 RX ADMIN — HEPARIN SODIUM 8000 UNIT(S): 5000 INJECTION INTRAVENOUS; SUBCUTANEOUS at 21:41

## 2024-01-01 RX ADMIN — Medication 3 MILLILITER(S): at 00:14

## 2024-01-01 RX ADMIN — AMIODARONE HYDROCHLORIDE 200 MILLIGRAM(S): 400 TABLET ORAL at 05:52

## 2024-01-01 RX ADMIN — INSULIN GLARGINE 15 UNIT(S): 100 INJECTION, SOLUTION SUBCUTANEOUS at 22:21

## 2024-01-01 RX ADMIN — PHENYLEPHRINE HYDROCHLORIDE 7.58 MICROGRAM(S)/KG/MIN: 10 INJECTION INTRAVENOUS at 04:46

## 2024-01-01 RX ADMIN — HEPARIN SODIUM 1500 UNIT(S)/HR: 5000 INJECTION INTRAVENOUS; SUBCUTANEOUS at 14:46

## 2024-01-01 RX ADMIN — Medication 200 MILLIGRAM(S): at 17:00

## 2024-01-01 RX ADMIN — Medication 10 MG/HR: at 13:50

## 2024-01-01 RX ADMIN — Medication 50 MILLIGRAM(S): at 05:40

## 2024-01-01 RX ADMIN — AMIODARONE HYDROCHLORIDE 200 MILLIGRAM(S): 400 TABLET ORAL at 06:47

## 2024-01-01 RX ADMIN — Medication 3 MILLILITER(S): at 07:56

## 2024-01-01 RX ADMIN — HEPARIN SODIUM 5000 UNIT(S): 5000 INJECTION INTRAVENOUS; SUBCUTANEOUS at 05:05

## 2024-01-01 RX ADMIN — AMIODARONE HYDROCHLORIDE 200 MILLIGRAM(S): 400 TABLET ORAL at 05:09

## 2024-01-01 RX ADMIN — Medication 81 MILLIGRAM(S): at 11:00

## 2024-01-01 RX ADMIN — APIXABAN 2.5 MILLIGRAM(S): 2.5 TABLET, FILM COATED ORAL at 21:23

## 2024-01-01 RX ADMIN — SODIUM CHLORIDE 75 MILLILITER(S): 9 INJECTION, SOLUTION INTRAVENOUS at 10:34

## 2024-01-01 RX ADMIN — Medication 200 MILLIGRAM(S): at 18:08

## 2024-01-01 RX ADMIN — MONTELUKAST 10 MILLIGRAM(S): 4 TABLET, CHEWABLE ORAL at 12:36

## 2024-01-01 RX ADMIN — HYDROMORPHONE HYDROCHLORIDE 0.5 MILLIGRAM(S): 2 INJECTION INTRAMUSCULAR; INTRAVENOUS; SUBCUTANEOUS at 09:27

## 2024-01-01 RX ADMIN — Medication 1: at 11:41

## 2024-01-01 RX ADMIN — Medication 25 MILLIGRAM(S): at 05:06

## 2024-01-01 RX ADMIN — Medication 25 MILLIGRAM(S): at 22:04

## 2024-01-01 RX ADMIN — PIPERACILLIN AND TAZOBACTAM 25 GRAM(S): 4; .5 INJECTION, POWDER, LYOPHILIZED, FOR SOLUTION INTRAVENOUS at 23:02

## 2024-01-01 RX ADMIN — PHENYLEPHRINE HYDROCHLORIDE 7.58 MICROGRAM(S)/KG/MIN: 10 INJECTION INTRAVENOUS at 18:59

## 2024-01-01 RX ADMIN — AMIODARONE HYDROCHLORIDE 200 MILLIGRAM(S): 400 TABLET ORAL at 05:31

## 2024-01-01 RX ADMIN — Medication 6: at 11:37

## 2024-01-01 RX ADMIN — APIXABAN 2.5 MILLIGRAM(S): 2.5 TABLET, FILM COATED ORAL at 08:05

## 2024-01-01 RX ADMIN — Medication 0.5 MILLIGRAM(S): at 18:37

## 2024-01-01 RX ADMIN — PIPERACILLIN AND TAZOBACTAM 25 GRAM(S): 4; .5 INJECTION, POWDER, LYOPHILIZED, FOR SOLUTION INTRAVENOUS at 14:34

## 2024-01-01 RX ADMIN — APIXABAN 2.5 MILLIGRAM(S): 2.5 TABLET, FILM COATED ORAL at 08:46

## 2024-01-01 RX ADMIN — Medication 250 MILLIGRAM(S): at 17:49

## 2024-01-01 RX ADMIN — Medication 500000 UNIT(S): at 06:47

## 2024-01-01 RX ADMIN — Medication 650 MILLIGRAM(S): at 12:56

## 2024-01-01 RX ADMIN — MONTELUKAST 10 MILLIGRAM(S): 4 TABLET, CHEWABLE ORAL at 22:01

## 2024-01-01 RX ADMIN — Medication 25 GRAM(S): at 13:08

## 2024-01-01 RX ADMIN — Medication 3 MILLILITER(S): at 19:40

## 2024-01-01 RX ADMIN — Medication 650 MILLIGRAM(S): at 06:21

## 2024-01-01 RX ADMIN — Medication 3 MILLILITER(S): at 07:24

## 2024-01-01 RX ADMIN — Medication 500000 UNIT(S): at 21:41

## 2024-01-01 RX ADMIN — Medication 1: at 11:15

## 2024-01-01 RX ADMIN — SODIUM CHLORIDE 75 MILLILITER(S): 9 INJECTION, SOLUTION INTRAVENOUS at 11:41

## 2024-01-01 RX ADMIN — Medication 3 MILLILITER(S): at 13:48

## 2024-01-01 RX ADMIN — AMIODARONE HYDROCHLORIDE 16.7 MG/MIN: 400 TABLET ORAL at 23:38

## 2024-01-01 RX ADMIN — AMIODARONE HYDROCHLORIDE 200 MILLIGRAM(S): 400 TABLET ORAL at 06:09

## 2024-01-01 RX ADMIN — Medication 1: at 18:22

## 2024-01-01 RX ADMIN — Medication 500 MILLIGRAM(S): at 06:09

## 2024-01-01 RX ADMIN — AMIODARONE HYDROCHLORIDE 200 MILLIGRAM(S): 400 TABLET ORAL at 05:14

## 2024-01-01 RX ADMIN — HEPARIN SODIUM 1800 UNIT(S)/HR: 5000 INJECTION INTRAVENOUS; SUBCUTANEOUS at 21:43

## 2024-01-01 RX ADMIN — PHENYLEPHRINE HYDROCHLORIDE 7.58 MICROGRAM(S)/KG/MIN: 10 INJECTION INTRAVENOUS at 08:31

## 2024-01-01 RX ADMIN — Medication 6: at 18:17

## 2024-01-01 RX ADMIN — Medication 0.5 MILLIGRAM(S): at 08:12

## 2024-01-01 RX ADMIN — Medication 200 MILLIGRAM(S): at 17:06

## 2024-01-01 RX ADMIN — INSULIN GLARGINE 6 UNIT(S): 100 INJECTION, SOLUTION SUBCUTANEOUS at 22:03

## 2024-01-01 RX ADMIN — Medication 400 MILLIGRAM(S): at 17:11

## 2024-01-01 RX ADMIN — ATORVASTATIN CALCIUM 80 MILLIGRAM(S): 80 TABLET, FILM COATED ORAL at 21:41

## 2024-01-01 RX ADMIN — Medication 2: at 08:32

## 2024-01-01 RX ADMIN — Medication 0.5 MILLIGRAM(S): at 20:13

## 2024-01-01 RX ADMIN — Medication 2: at 17:49

## 2024-01-01 RX ADMIN — Medication 0.5 MILLIGRAM(S): at 19:23

## 2024-01-01 RX ADMIN — Medication 500000 UNIT(S): at 14:31

## 2024-01-01 RX ADMIN — Medication 3 MILLILITER(S): at 19:41

## 2024-01-01 RX ADMIN — MIDODRINE HYDROCHLORIDE 10 MILLIGRAM(S): 2.5 TABLET ORAL at 14:15

## 2024-01-01 RX ADMIN — MAGNESIUM OXIDE 400 MG ORAL TABLET 400 MILLIGRAM(S): 241.3 TABLET ORAL at 13:24

## 2024-01-01 RX ADMIN — MIDODRINE HYDROCHLORIDE 10 MILLIGRAM(S): 2.5 TABLET ORAL at 05:14

## 2024-01-01 RX ADMIN — Medication 650 MILLIGRAM(S): at 18:00

## 2024-01-01 RX ADMIN — Medication 25 MILLIGRAM(S): at 18:03

## 2024-01-01 RX ADMIN — POTASSIUM PHOSPHATE, MONOBASIC POTASSIUM PHOSPHATE, DIBASIC 83.33 MILLIMOLE(S): 236; 224 INJECTION, SOLUTION INTRAVENOUS at 12:47

## 2024-01-01 RX ADMIN — Medication 650 MILLIGRAM(S): at 13:56

## 2024-01-01 RX ADMIN — Medication 1: at 05:18

## 2024-01-01 RX ADMIN — Medication 500 MILLIGRAM(S): at 17:26

## 2024-01-01 RX ADMIN — ATORVASTATIN CALCIUM 80 MILLIGRAM(S): 80 TABLET, FILM COATED ORAL at 22:15

## 2024-01-01 RX ADMIN — Medication 1: at 11:36

## 2024-01-01 RX ADMIN — PIPERACILLIN AND TAZOBACTAM 25 GRAM(S): 4; .5 INJECTION, POWDER, LYOPHILIZED, FOR SOLUTION INTRAVENOUS at 05:34

## 2024-01-01 RX ADMIN — Medication 500000 UNIT(S): at 22:27

## 2024-01-01 RX ADMIN — Medication 3 MILLILITER(S): at 01:18

## 2024-01-01 RX ADMIN — Medication 2: at 12:40

## 2024-01-01 RX ADMIN — Medication 0.5 MILLIGRAM(S): at 07:39

## 2024-01-01 RX ADMIN — APIXABAN 2.5 MILLIGRAM(S): 2.5 TABLET, FILM COATED ORAL at 05:41

## 2024-01-01 RX ADMIN — Medication 200 MILLIGRAM(S): at 05:10

## 2024-01-01 RX ADMIN — Medication 3 MILLILITER(S): at 07:46

## 2024-01-01 RX ADMIN — APIXABAN 2.5 MILLIGRAM(S): 2.5 TABLET, FILM COATED ORAL at 22:09

## 2024-01-01 RX ADMIN — INSULIN GLARGINE 10 UNIT(S): 100 INJECTION, SOLUTION SUBCUTANEOUS at 21:00

## 2024-01-01 RX ADMIN — Medication 4: at 12:43

## 2024-01-01 RX ADMIN — INSULIN GLARGINE 36 UNIT(S): 100 INJECTION, SOLUTION SUBCUTANEOUS at 00:17

## 2024-01-01 RX ADMIN — Medication 325 MILLIGRAM(S): at 11:13

## 2024-01-01 RX ADMIN — SODIUM CHLORIDE 65 MILLILITER(S): 9 INJECTION, SOLUTION INTRAVENOUS at 19:05

## 2024-01-01 RX ADMIN — PIPERACILLIN AND TAZOBACTAM 25 GRAM(S): 4; .5 INJECTION, POWDER, LYOPHILIZED, FOR SOLUTION INTRAVENOUS at 21:44

## 2024-01-01 RX ADMIN — SODIUM CHLORIDE 75 MILLILITER(S): 9 INJECTION, SOLUTION INTRAVENOUS at 05:35

## 2024-01-01 RX ADMIN — Medication 25 MILLIGRAM(S): at 18:18

## 2024-01-01 RX ADMIN — INSULIN GLARGINE 30 UNIT(S): 100 INJECTION, SOLUTION SUBCUTANEOUS at 22:40

## 2024-01-01 RX ADMIN — ATORVASTATIN CALCIUM 80 MILLIGRAM(S): 80 TABLET, FILM COATED ORAL at 22:01

## 2024-01-01 RX ADMIN — APIXABAN 2.5 MILLIGRAM(S): 2.5 TABLET, FILM COATED ORAL at 06:10

## 2024-01-01 RX ADMIN — Medication 200 MILLIGRAM(S): at 05:31

## 2024-01-01 RX ADMIN — HEPARIN SODIUM 1000 UNIT(S)/HR: 5000 INJECTION INTRAVENOUS; SUBCUTANEOUS at 06:55

## 2024-01-01 RX ADMIN — Medication 0.5 MILLIGRAM(S): at 11:59

## 2024-01-01 RX ADMIN — ENOXAPARIN SODIUM 90 MILLIGRAM(S): 100 INJECTION SUBCUTANEOUS at 18:02

## 2024-01-01 RX ADMIN — Medication 4: at 12:25

## 2024-01-01 RX ADMIN — APIXABAN 2.5 MILLIGRAM(S): 2.5 TABLET, FILM COATED ORAL at 19:32

## 2024-01-01 RX ADMIN — APIXABAN 2.5 MILLIGRAM(S): 2.5 TABLET, FILM COATED ORAL at 09:45

## 2024-01-01 RX ADMIN — HEPARIN SODIUM 0 UNIT(S)/HR: 5000 INJECTION INTRAVENOUS; SUBCUTANEOUS at 15:30

## 2024-01-01 RX ADMIN — Medication 3 MILLILITER(S): at 13:56

## 2024-01-01 RX ADMIN — MONTELUKAST 10 MILLIGRAM(S): 4 TABLET, CHEWABLE ORAL at 11:59

## 2024-01-01 RX ADMIN — Medication 2.5 MILLIGRAM(S): at 17:26

## 2024-01-01 RX ADMIN — INSULIN GLARGINE 15 UNIT(S): 100 INJECTION, SOLUTION SUBCUTANEOUS at 21:25

## 2024-01-01 RX ADMIN — INSULIN GLARGINE 15 UNIT(S): 100 INJECTION, SOLUTION SUBCUTANEOUS at 21:48

## 2024-01-01 RX ADMIN — ZINC SULFATE TAB 220 MG (50 MG ZINC EQUIVALENT) 220 MILLIGRAM(S): 220 (50 ZN) TAB at 14:23

## 2024-01-01 RX ADMIN — APIXABAN 2.5 MILLIGRAM(S): 2.5 TABLET, FILM COATED ORAL at 21:58

## 2024-01-01 RX ADMIN — Medication 63.75 MILLIMOLE(S): at 14:18

## 2024-01-01 RX ADMIN — ZINC SULFATE TAB 220 MG (50 MG ZINC EQUIVALENT) 220 MILLIGRAM(S): 220 (50 ZN) TAB at 12:56

## 2024-01-01 RX ADMIN — Medication 3 UNIT(S): at 00:22

## 2024-01-01 RX ADMIN — Medication 200 MILLIGRAM(S): at 17:19

## 2024-01-01 RX ADMIN — Medication 200 MILLIGRAM(S): at 17:28

## 2024-01-01 RX ADMIN — Medication 500000 UNIT(S): at 05:41

## 2024-01-01 RX ADMIN — PHENYLEPHRINE HYDROCHLORIDE 7.58 MICROGRAM(S)/KG/MIN: 10 INJECTION INTRAVENOUS at 20:57

## 2024-01-01 RX ADMIN — Medication 500 MILLIGRAM(S): at 05:07

## 2024-01-01 RX ADMIN — Medication 0.5 MILLIGRAM(S): at 19:38

## 2024-01-01 RX ADMIN — Medication 0.5 MILLIGRAM(S): at 08:07

## 2024-01-01 RX ADMIN — Medication 3 MILLILITER(S): at 07:48

## 2024-01-01 RX ADMIN — APIXABAN 2.5 MILLIGRAM(S): 2.5 TABLET, FILM COATED ORAL at 20:56

## 2024-01-01 RX ADMIN — Medication 50 MILLIGRAM(S): at 06:41

## 2024-01-01 RX ADMIN — Medication 4: at 17:32

## 2024-01-01 RX ADMIN — Medication 10 MILLIGRAM(S): at 11:11

## 2024-01-01 RX ADMIN — Medication 3 MILLILITER(S): at 20:41

## 2024-01-01 RX ADMIN — Medication 25 MILLIGRAM(S): at 05:41

## 2024-01-01 RX ADMIN — Medication 650 MILLIGRAM(S): at 22:30

## 2024-01-01 RX ADMIN — Medication 3 MILLILITER(S): at 19:08

## 2024-01-01 RX ADMIN — Medication 3 MILLILITER(S): at 07:52

## 2024-01-01 RX ADMIN — PHENYLEPHRINE HYDROCHLORIDE 7.58 MICROGRAM(S)/KG/MIN: 10 INJECTION INTRAVENOUS at 06:19

## 2024-01-01 RX ADMIN — MONTELUKAST 10 MILLIGRAM(S): 4 TABLET, CHEWABLE ORAL at 11:58

## 2024-01-01 RX ADMIN — Medication 650 MILLIGRAM(S): at 05:24

## 2024-01-01 RX ADMIN — Medication 3 MILLILITER(S): at 20:49

## 2024-01-01 RX ADMIN — AMIODARONE HYDROCHLORIDE 200 MILLIGRAM(S): 400 TABLET ORAL at 06:23

## 2024-01-01 RX ADMIN — ENOXAPARIN SODIUM 100 MILLIGRAM(S): 100 INJECTION SUBCUTANEOUS at 17:07

## 2024-01-01 RX ADMIN — APIXABAN 2.5 MILLIGRAM(S): 2.5 TABLET, FILM COATED ORAL at 18:00

## 2024-01-01 RX ADMIN — Medication 6: at 05:08

## 2024-01-01 RX ADMIN — Medication 25 MILLIGRAM(S): at 18:58

## 2024-01-01 RX ADMIN — Medication 200 MILLIGRAM(S): at 05:08

## 2024-01-01 RX ADMIN — Medication 10 MG/HR: at 11:29

## 2024-01-01 RX ADMIN — Medication 0.5 MILLIGRAM(S): at 07:55

## 2024-01-01 RX ADMIN — Medication 0.5 MILLIGRAM(S): at 19:07

## 2024-01-01 RX ADMIN — POTASSIUM PHOSPHATE, MONOBASIC POTASSIUM PHOSPHATE, DIBASIC 62.5 MILLIMOLE(S): 236; 224 INJECTION, SOLUTION INTRAVENOUS at 11:52

## 2024-01-01 RX ADMIN — Medication 650 MILLIGRAM(S): at 00:17

## 2024-01-01 RX ADMIN — ATORVASTATIN CALCIUM 80 MILLIGRAM(S): 80 TABLET, FILM COATED ORAL at 21:44

## 2024-01-01 RX ADMIN — Medication 25 MILLIGRAM(S): at 18:10

## 2024-01-01 RX ADMIN — HEPARIN SODIUM 5000 UNIT(S): 5000 INJECTION INTRAVENOUS; SUBCUTANEOUS at 21:12

## 2024-01-01 RX ADMIN — HEPARIN SODIUM 5000 UNIT(S): 5000 INJECTION INTRAVENOUS; SUBCUTANEOUS at 13:15

## 2024-01-01 RX ADMIN — PHENYLEPHRINE HYDROCHLORIDE 7.58 MICROGRAM(S)/KG/MIN: 10 INJECTION INTRAVENOUS at 15:22

## 2024-01-01 RX ADMIN — Medication 2.5 MILLIGRAM(S): at 18:03

## 2024-01-01 RX ADMIN — Medication 4: at 09:17

## 2024-01-01 RX ADMIN — APIXABAN 2.5 MILLIGRAM(S): 2.5 TABLET, FILM COATED ORAL at 17:19

## 2024-01-01 RX ADMIN — ATORVASTATIN CALCIUM 80 MILLIGRAM(S): 80 TABLET, FILM COATED ORAL at 21:01

## 2024-01-01 RX ADMIN — Medication 3 MILLILITER(S): at 14:25

## 2024-01-01 RX ADMIN — Medication 81 MILLIGRAM(S): at 11:51

## 2024-01-01 RX ADMIN — Medication 25 MILLIGRAM(S): at 05:34

## 2024-01-01 RX ADMIN — ATORVASTATIN CALCIUM 80 MILLIGRAM(S): 80 TABLET, FILM COATED ORAL at 22:46

## 2024-01-01 RX ADMIN — Medication 3 MILLILITER(S): at 19:28

## 2024-01-01 RX ADMIN — SODIUM CHLORIDE 100 MILLILITER(S): 9 INJECTION, SOLUTION INTRAVENOUS at 05:21

## 2024-01-01 RX ADMIN — SODIUM CHLORIDE 1000 MILLILITER(S): 9 INJECTION INTRAMUSCULAR; INTRAVENOUS; SUBCUTANEOUS at 19:20

## 2024-01-01 RX ADMIN — Medication 650 MILLIGRAM(S): at 01:17

## 2024-01-01 RX ADMIN — Medication 25 MILLIGRAM(S): at 05:08

## 2024-01-01 RX ADMIN — PIPERACILLIN AND TAZOBACTAM 25 GRAM(S): 4; .5 INJECTION, POWDER, LYOPHILIZED, FOR SOLUTION INTRAVENOUS at 06:22

## 2024-01-01 RX ADMIN — Medication 650 MILLIGRAM(S): at 06:33

## 2024-01-01 RX ADMIN — Medication 81 MILLIGRAM(S): at 13:03

## 2024-01-01 RX ADMIN — Medication 4: at 05:53

## 2024-01-01 RX ADMIN — ATORVASTATIN CALCIUM 80 MILLIGRAM(S): 80 TABLET, FILM COATED ORAL at 22:04

## 2024-01-01 RX ADMIN — AMIODARONE HYDROCHLORIDE 16.7 MG/MIN: 400 TABLET ORAL at 05:09

## 2024-01-01 RX ADMIN — ATORVASTATIN CALCIUM 80 MILLIGRAM(S): 80 TABLET, FILM COATED ORAL at 23:18

## 2024-01-01 RX ADMIN — Medication 500000 UNIT(S): at 00:36

## 2024-01-01 RX ADMIN — Medication 1: at 17:32

## 2024-01-01 RX ADMIN — Medication 1: at 12:15

## 2024-01-01 RX ADMIN — Medication 650 MILLIGRAM(S): at 07:21

## 2024-01-01 RX ADMIN — PHENYLEPHRINE HYDROCHLORIDE 7.58 MICROGRAM(S)/KG/MIN: 10 INJECTION INTRAVENOUS at 17:14

## 2024-01-01 RX ADMIN — Medication 200 MILLIGRAM(S): at 17:29

## 2024-01-01 RX ADMIN — HEPARIN SODIUM 1500 UNIT(S)/HR: 5000 INJECTION INTRAVENOUS; SUBCUTANEOUS at 05:17

## 2024-01-01 RX ADMIN — APIXABAN 2.5 MILLIGRAM(S): 2.5 TABLET, FILM COATED ORAL at 07:59

## 2024-01-01 RX ADMIN — Medication 25 MILLIGRAM(S): at 05:27

## 2024-01-01 RX ADMIN — Medication 650 MILLIGRAM(S): at 01:10

## 2024-01-01 RX ADMIN — Medication 500000 UNIT(S): at 13:12

## 2024-01-01 RX ADMIN — SODIUM CHLORIDE 60 MILLILITER(S): 9 INJECTION, SOLUTION INTRAVENOUS at 09:32

## 2024-01-01 RX ADMIN — Medication 0.5 MILLIGRAM(S): at 07:18

## 2024-01-01 RX ADMIN — SODIUM CHLORIDE 75 MILLILITER(S): 9 INJECTION, SOLUTION INTRAVENOUS at 05:28

## 2024-01-01 RX ADMIN — Medication 500 MILLIGRAM(S): at 17:07

## 2024-01-01 RX ADMIN — PHENYLEPHRINE HYDROCHLORIDE 7.58 MICROGRAM(S)/KG/MIN: 10 INJECTION INTRAVENOUS at 21:06

## 2024-01-01 RX ADMIN — MEPOLIZUMAB 100 MILLIGRAM(S): 100 INJECTION, SOLUTION SUBCUTANEOUS at 10:45

## 2024-01-01 RX ADMIN — BUDESONIDE AND FORMOTEROL FUMARATE DIHYDRATE 2 PUFF(S): 160; 4.5 AEROSOL RESPIRATORY (INHALATION) at 19:41

## 2024-01-01 RX ADMIN — Medication 1: at 05:19

## 2024-01-01 RX ADMIN — Medication 6: at 06:48

## 2024-01-01 RX ADMIN — AMIODARONE HYDROCHLORIDE 16.7 MG/MIN: 400 TABLET ORAL at 05:04

## 2024-01-01 RX ADMIN — PHENYLEPHRINE HYDROCHLORIDE 7.58 MICROGRAM(S)/KG/MIN: 10 INJECTION INTRAVENOUS at 09:32

## 2024-01-01 RX ADMIN — Medication 100 MILLIGRAM(S): at 13:49

## 2024-01-01 RX ADMIN — SODIUM CHLORIDE 100 MILLILITER(S): 9 INJECTION, SOLUTION INTRAVENOUS at 23:33

## 2024-01-01 RX ADMIN — Medication 3 MILLILITER(S): at 08:12

## 2024-01-01 RX ADMIN — Medication 102 MILLIGRAM(S): at 16:23

## 2024-01-01 RX ADMIN — AMIODARONE HYDROCHLORIDE 400 MILLIGRAM(S): 400 TABLET ORAL at 21:01

## 2024-01-01 RX ADMIN — ENOXAPARIN SODIUM 90 MILLIGRAM(S): 100 INJECTION SUBCUTANEOUS at 05:41

## 2024-01-01 RX ADMIN — Medication 500 MILLIGRAM(S): at 17:59

## 2024-01-01 RX ADMIN — Medication 3 MILLILITER(S): at 07:36

## 2024-01-01 RX ADMIN — Medication 0.5 MILLIGRAM(S): at 20:02

## 2024-01-01 RX ADMIN — APIXABAN 2.5 MILLIGRAM(S): 2.5 TABLET, FILM COATED ORAL at 06:41

## 2024-01-01 RX ADMIN — Medication 3 MILLILITER(S): at 00:58

## 2024-01-01 RX ADMIN — PHENYLEPHRINE HYDROCHLORIDE 7.58 MICROGRAM(S)/KG/MIN: 10 INJECTION INTRAVENOUS at 22:12

## 2024-01-01 RX ADMIN — APIXABAN 2.5 MILLIGRAM(S): 2.5 TABLET, FILM COATED ORAL at 19:46

## 2024-01-01 RX ADMIN — Medication 1: at 05:21

## 2024-01-01 RX ADMIN — Medication 1: at 00:24

## 2024-01-01 RX ADMIN — AMIODARONE HYDROCHLORIDE 200 MILLIGRAM(S): 400 TABLET ORAL at 05:11

## 2024-01-01 RX ADMIN — Medication 650 MILLIGRAM(S): at 14:23

## 2024-01-01 RX ADMIN — Medication 200 MILLIGRAM(S): at 17:12

## 2024-01-01 RX ADMIN — Medication 100 MILLIEQUIVALENT(S): at 09:59

## 2024-01-01 RX ADMIN — Medication 2.5 MILLIGRAM(S): at 05:41

## 2024-01-01 RX ADMIN — Medication 3 MILLILITER(S): at 20:39

## 2024-01-01 RX ADMIN — MAGNESIUM OXIDE 400 MG ORAL TABLET 400 MILLIGRAM(S): 241.3 TABLET ORAL at 13:11

## 2024-01-01 RX ADMIN — APIXABAN 2.5 MILLIGRAM(S): 2.5 TABLET, FILM COATED ORAL at 08:47

## 2024-01-01 RX ADMIN — PIPERACILLIN AND TAZOBACTAM 25 GRAM(S): 4; .5 INJECTION, POWDER, LYOPHILIZED, FOR SOLUTION INTRAVENOUS at 14:37

## 2024-01-01 RX ADMIN — MONTELUKAST 10 MILLIGRAM(S): 4 TABLET, CHEWABLE ORAL at 12:40

## 2024-01-01 RX ADMIN — Medication 300 MILLIGRAM(S): at 11:53

## 2024-01-01 RX ADMIN — ATORVASTATIN CALCIUM 80 MILLIGRAM(S): 80 TABLET, FILM COATED ORAL at 22:11

## 2024-01-01 RX ADMIN — Medication 50 MILLIGRAM(S): at 13:50

## 2024-01-01 RX ADMIN — SODIUM CHLORIDE 60 MILLILITER(S): 9 INJECTION, SOLUTION INTRAVENOUS at 14:10

## 2024-01-01 RX ADMIN — Medication 2: at 13:42

## 2024-01-01 RX ADMIN — Medication 650 MILLIGRAM(S): at 12:37

## 2024-01-01 RX ADMIN — Medication 6: at 13:52

## 2024-01-01 RX ADMIN — INSULIN GLARGINE 10 UNIT(S): 100 INJECTION, SOLUTION SUBCUTANEOUS at 13:12

## 2024-01-01 RX ADMIN — Medication 0.5 MILLIGRAM(S): at 07:49

## 2024-01-01 RX ADMIN — Medication 25 MILLIGRAM(S): at 06:47

## 2024-01-01 RX ADMIN — Medication 0.5 MILLIGRAM(S): at 19:32

## 2024-01-01 RX ADMIN — INSULIN GLARGINE 15 UNIT(S): 100 INJECTION, SOLUTION SUBCUTANEOUS at 22:15

## 2024-01-01 RX ADMIN — SODIUM CHLORIDE 75 MILLILITER(S): 9 INJECTION, SOLUTION INTRAVENOUS at 12:57

## 2024-01-01 RX ADMIN — HYDROMORPHONE HYDROCHLORIDE 0.5 MILLIGRAM(S): 2 INJECTION INTRAMUSCULAR; INTRAVENOUS; SUBCUTANEOUS at 23:23

## 2024-01-01 RX ADMIN — MONTELUKAST 10 MILLIGRAM(S): 4 TABLET, CHEWABLE ORAL at 17:49

## 2024-01-01 RX ADMIN — APIXABAN 2.5 MILLIGRAM(S): 2.5 TABLET, FILM COATED ORAL at 14:54

## 2024-01-01 RX ADMIN — Medication 300 MILLIGRAM(S): at 11:16

## 2024-01-01 RX ADMIN — Medication 3 MILLILITER(S): at 19:32

## 2024-01-01 RX ADMIN — AMIODARONE HYDROCHLORIDE 200 MILLIGRAM(S): 400 TABLET ORAL at 05:06

## 2024-01-01 RX ADMIN — Medication 3 MILLILITER(S): at 20:05

## 2024-01-01 RX ADMIN — ENOXAPARIN SODIUM 90 MILLIGRAM(S): 100 INJECTION SUBCUTANEOUS at 06:47

## 2024-01-01 RX ADMIN — Medication 3 MILLILITER(S): at 01:39

## 2024-01-01 RX ADMIN — Medication 25 MILLIGRAM(S): at 18:00

## 2024-01-01 RX ADMIN — Medication 200 MILLIGRAM(S): at 18:29

## 2024-01-01 RX ADMIN — HEPARIN SODIUM 5000 UNIT(S): 5000 INJECTION INTRAVENOUS; SUBCUTANEOUS at 21:07

## 2024-01-01 RX ADMIN — ATORVASTATIN CALCIUM 80 MILLIGRAM(S): 80 TABLET, FILM COATED ORAL at 21:35

## 2024-01-01 RX ADMIN — PIPERACILLIN AND TAZOBACTAM 25 GRAM(S): 4; .5 INJECTION, POWDER, LYOPHILIZED, FOR SOLUTION INTRAVENOUS at 05:26

## 2024-01-01 RX ADMIN — Medication 25 MILLIGRAM(S): at 17:07

## 2024-01-01 RX ADMIN — MAGNESIUM OXIDE 400 MG ORAL TABLET 400 MILLIGRAM(S): 241.3 TABLET ORAL at 13:25

## 2024-01-01 RX ADMIN — AMIODARONE HYDROCHLORIDE 33.3 MG/MIN: 400 TABLET ORAL at 17:28

## 2024-01-01 RX ADMIN — Medication 3 MILLILITER(S): at 00:40

## 2024-01-01 RX ADMIN — Medication 81 MILLIGRAM(S): at 12:40

## 2024-01-01 RX ADMIN — PHENYLEPHRINE HYDROCHLORIDE 7.58 MICROGRAM(S)/KG/MIN: 10 INJECTION INTRAVENOUS at 15:03

## 2024-01-01 RX ADMIN — Medication 3 MILLILITER(S): at 19:24

## 2024-01-01 RX ADMIN — ATORVASTATIN CALCIUM 80 MILLIGRAM(S): 80 TABLET, FILM COATED ORAL at 22:13

## 2024-01-01 RX ADMIN — Medication 3 MILLILITER(S): at 07:39

## 2024-01-01 RX ADMIN — Medication 3 MILLILITER(S): at 13:46

## 2024-01-01 RX ADMIN — APIXABAN 2.5 MILLIGRAM(S): 2.5 TABLET, FILM COATED ORAL at 22:15

## 2024-01-01 RX ADMIN — Medication 0.5 MILLIGRAM(S): at 20:05

## 2024-01-01 RX ADMIN — HEPARIN SODIUM 5000 UNIT(S): 5000 INJECTION INTRAVENOUS; SUBCUTANEOUS at 13:08

## 2024-01-01 RX ADMIN — MONTELUKAST 10 MILLIGRAM(S): 4 TABLET, CHEWABLE ORAL at 11:38

## 2024-01-01 RX ADMIN — PIPERACILLIN AND TAZOBACTAM 25 GRAM(S): 4; .5 INJECTION, POWDER, LYOPHILIZED, FOR SOLUTION INTRAVENOUS at 05:24

## 2024-01-01 RX ADMIN — Medication 81 MILLIGRAM(S): at 12:36

## 2024-01-01 RX ADMIN — Medication 3 MILLILITER(S): at 13:39

## 2024-01-01 RX ADMIN — Medication 2: at 05:25

## 2024-01-01 RX ADMIN — AMIODARONE HYDROCHLORIDE 200 MILLIGRAM(S): 400 TABLET ORAL at 05:27

## 2024-01-01 RX ADMIN — Medication 3 MILLILITER(S): at 00:46

## 2024-01-01 RX ADMIN — Medication 500 MILLIGRAM(S): at 17:18

## 2024-01-01 RX ADMIN — HYDROMORPHONE HYDROCHLORIDE 0.5 MILLIGRAM(S): 2 INJECTION INTRAMUSCULAR; INTRAVENOUS; SUBCUTANEOUS at 06:00

## 2024-01-01 RX ADMIN — PIPERACILLIN AND TAZOBACTAM 25 GRAM(S): 4; .5 INJECTION, POWDER, LYOPHILIZED, FOR SOLUTION INTRAVENOUS at 15:54

## 2024-01-01 RX ADMIN — Medication 1: at 17:19

## 2024-01-01 RX ADMIN — HEPARIN SODIUM 5000 UNIT(S): 5000 INJECTION INTRAVENOUS; SUBCUTANEOUS at 05:09

## 2024-01-01 RX ADMIN — TAMSULOSIN HYDROCHLORIDE 0.4 MILLIGRAM(S): 0.4 CAPSULE ORAL at 21:59

## 2024-01-01 RX ADMIN — AMIODARONE HYDROCHLORIDE 200 MILLIGRAM(S): 400 TABLET ORAL at 05:08

## 2024-01-01 RX ADMIN — AMIODARONE HYDROCHLORIDE 16.7 MG/MIN: 400 TABLET ORAL at 01:15

## 2024-01-01 RX ADMIN — Medication 81 MILLIGRAM(S): at 12:41

## 2024-01-01 RX ADMIN — HEPARIN SODIUM 0 UNIT(S)/HR: 5000 INJECTION INTRAVENOUS; SUBCUTANEOUS at 04:15

## 2024-01-01 RX ADMIN — HEPARIN SODIUM 1500 UNIT(S)/HR: 5000 INJECTION INTRAVENOUS; SUBCUTANEOUS at 07:27

## 2024-01-01 RX ADMIN — Medication 4: at 17:46

## 2024-01-01 RX ADMIN — INSULIN GLARGINE 15 UNIT(S): 100 INJECTION, SOLUTION SUBCUTANEOUS at 22:45

## 2024-01-01 RX ADMIN — Medication 500 MILLIGRAM(S): at 05:06

## 2024-01-01 RX ADMIN — Medication 650 MILLIGRAM(S): at 18:17

## 2024-01-01 RX ADMIN — Medication 10: at 00:17

## 2024-01-01 RX ADMIN — Medication 50 MILLIGRAM(S): at 21:59

## 2024-01-01 RX ADMIN — Medication 3: at 17:49

## 2024-01-01 RX ADMIN — Medication 0.5 MILLIGRAM(S): at 07:35

## 2024-01-01 RX ADMIN — Medication 0.5 MILLIGRAM(S): at 19:14

## 2024-01-01 RX ADMIN — PIPERACILLIN AND TAZOBACTAM 25 GRAM(S): 4; .5 INJECTION, POWDER, LYOPHILIZED, FOR SOLUTION INTRAVENOUS at 22:29

## 2024-01-01 RX ADMIN — ALBUTEROL 2 PUFF(S): 90 AEROSOL, METERED ORAL at 01:18

## 2024-01-01 RX ADMIN — Medication 0.5 MILLIGRAM(S): at 07:37

## 2024-01-01 RX ADMIN — ATORVASTATIN CALCIUM 80 MILLIGRAM(S): 80 TABLET, FILM COATED ORAL at 22:07

## 2024-01-01 RX ADMIN — Medication 2: at 17:58

## 2024-01-01 RX ADMIN — SODIUM CHLORIDE 75 MILLILITER(S): 9 INJECTION, SOLUTION INTRAVENOUS at 13:13

## 2024-01-01 RX ADMIN — AMIODARONE HYDROCHLORIDE 16.7 MG/MIN: 400 TABLET ORAL at 17:06

## 2024-01-01 RX ADMIN — ALBUTEROL 2 PUFF(S): 90 AEROSOL, METERED ORAL at 14:38

## 2024-01-01 RX ADMIN — Medication 3 MILLILITER(S): at 08:08

## 2024-01-01 RX ADMIN — ATORVASTATIN CALCIUM 80 MILLIGRAM(S): 80 TABLET, FILM COATED ORAL at 22:27

## 2024-01-01 RX ADMIN — Medication 4: at 12:42

## 2024-01-01 RX ADMIN — Medication 200 MILLIGRAM(S): at 17:23

## 2024-01-01 RX ADMIN — MAGNESIUM OXIDE 400 MG ORAL TABLET 400 MILLIGRAM(S): 241.3 TABLET ORAL at 14:23

## 2024-01-01 RX ADMIN — AMIODARONE HYDROCHLORIDE 200 MILLIGRAM(S): 400 TABLET ORAL at 17:23

## 2024-01-01 RX ADMIN — SODIUM CHLORIDE 1000 MILLILITER(S): 9 INJECTION INTRAMUSCULAR; INTRAVENOUS; SUBCUTANEOUS at 17:11

## 2024-01-01 RX ADMIN — Medication 4: at 05:13

## 2024-01-01 RX ADMIN — Medication 10: at 06:09

## 2024-01-01 RX ADMIN — SODIUM CHLORIDE 100 MILLILITER(S): 9 INJECTION, SOLUTION INTRAVENOUS at 22:16

## 2024-01-01 RX ADMIN — AMIODARONE HYDROCHLORIDE 400 MILLIGRAM(S): 400 TABLET ORAL at 22:13

## 2024-01-01 RX ADMIN — Medication 3 MILLILITER(S): at 13:45

## 2024-01-01 RX ADMIN — SODIUM CHLORIDE 100 MILLILITER(S): 9 INJECTION, SOLUTION INTRAVENOUS at 22:45

## 2024-01-01 RX ADMIN — Medication 500 MILLIGRAM(S): at 05:09

## 2024-01-01 RX ADMIN — Medication 3 MILLILITER(S): at 07:41

## 2024-01-01 RX ADMIN — Medication 500000 UNIT(S): at 17:28

## 2024-01-01 RX ADMIN — Medication 650 MILLIGRAM(S): at 06:10

## 2024-01-01 RX ADMIN — APIXABAN 2.5 MILLIGRAM(S): 2.5 TABLET, FILM COATED ORAL at 21:06

## 2024-01-01 RX ADMIN — INSULIN GLARGINE 15 UNIT(S): 100 INJECTION, SOLUTION SUBCUTANEOUS at 21:51

## 2024-01-01 RX ADMIN — Medication 3 UNIT(S): at 19:30

## 2024-01-01 RX ADMIN — MIDODRINE HYDROCHLORIDE 10 MILLIGRAM(S): 2.5 TABLET ORAL at 14:56

## 2024-01-01 RX ADMIN — INSULIN GLARGINE 15 UNIT(S): 100 INJECTION, SOLUTION SUBCUTANEOUS at 21:16

## 2024-01-01 RX ADMIN — Medication 1: at 12:35

## 2024-01-01 RX ADMIN — Medication 81 MILLIGRAM(S): at 12:43

## 2024-01-01 RX ADMIN — SODIUM CHLORIDE 100 MILLILITER(S): 9 INJECTION, SOLUTION INTRAVENOUS at 05:08

## 2024-01-01 RX ADMIN — Medication 650 MILLIGRAM(S): at 23:03

## 2024-01-01 RX ADMIN — SODIUM CHLORIDE 50 MILLILITER(S): 9 INJECTION, SOLUTION INTRAVENOUS at 13:46

## 2024-01-01 RX ADMIN — Medication 200 MILLIGRAM(S): at 05:12

## 2024-01-01 RX ADMIN — INSULIN GLARGINE 36 UNIT(S): 100 INJECTION, SOLUTION SUBCUTANEOUS at 22:01

## 2024-01-01 RX ADMIN — Medication 2: at 17:59

## 2024-01-01 RX ADMIN — SODIUM CHLORIDE 100 MILLILITER(S): 9 INJECTION, SOLUTION INTRAVENOUS at 09:32

## 2024-01-01 RX ADMIN — Medication 25 GRAM(S): at 13:15

## 2024-01-01 RX ADMIN — Medication 3 MILLILITER(S): at 00:43

## 2024-01-01 RX ADMIN — Medication 25 MILLIGRAM(S): at 06:10

## 2024-01-01 RX ADMIN — APIXABAN 2.5 MILLIGRAM(S): 2.5 TABLET, FILM COATED ORAL at 21:15

## 2024-01-01 RX ADMIN — Medication 1: at 06:39

## 2024-01-01 RX ADMIN — Medication 0.5 MILLIGRAM(S): at 20:49

## 2024-01-01 RX ADMIN — Medication 40 MILLIEQUIVALENT(S): at 09:46

## 2024-01-01 RX ADMIN — Medication 500 MILLIGRAM(S): at 06:41

## 2024-01-01 RX ADMIN — INSULIN GLARGINE 15 UNIT(S): 100 INJECTION, SOLUTION SUBCUTANEOUS at 22:03

## 2024-01-01 RX ADMIN — Medication 3 UNIT(S): at 13:53

## 2024-01-01 RX ADMIN — Medication 1: at 23:18

## 2024-01-01 RX ADMIN — MONTELUKAST 10 MILLIGRAM(S): 4 TABLET, CHEWABLE ORAL at 22:16

## 2024-01-01 RX ADMIN — ALBUTEROL 2 PUFF(S): 90 AEROSOL, METERED ORAL at 01:30

## 2024-01-01 RX ADMIN — TIOTROPIUM BROMIDE 2 PUFF(S): 18 CAPSULE ORAL; RESPIRATORY (INHALATION) at 06:49

## 2024-01-01 RX ADMIN — AMIODARONE HYDROCHLORIDE 400 MILLIGRAM(S): 400 TABLET ORAL at 14:57

## 2024-01-01 RX ADMIN — Medication 650 MILLIGRAM(S): at 00:43

## 2024-01-01 RX ADMIN — HEPARIN SODIUM 1800 UNIT(S)/HR: 5000 INJECTION INTRAVENOUS; SUBCUTANEOUS at 00:05

## 2024-01-01 RX ADMIN — Medication 4: at 11:53

## 2024-01-01 RX ADMIN — Medication 81 MILLIGRAM(S): at 11:59

## 2024-01-01 RX ADMIN — CEFTRIAXONE 100 MILLIGRAM(S): 500 INJECTION, POWDER, FOR SOLUTION INTRAMUSCULAR; INTRAVENOUS at 17:11

## 2024-01-01 RX ADMIN — Medication 500000 UNIT(S): at 05:09

## 2024-01-01 RX ADMIN — HEPARIN SODIUM 1200 UNIT(S)/HR: 5000 INJECTION INTRAVENOUS; SUBCUTANEOUS at 19:40

## 2024-01-01 RX ADMIN — INSULIN GLARGINE 15 UNIT(S): 100 INJECTION, SOLUTION SUBCUTANEOUS at 22:28

## 2024-01-01 RX ADMIN — ATORVASTATIN CALCIUM 80 MILLIGRAM(S): 80 TABLET, FILM COATED ORAL at 21:15

## 2024-01-01 RX ADMIN — HEPARIN SODIUM 5000 UNIT(S): 5000 INJECTION INTRAVENOUS; SUBCUTANEOUS at 13:02

## 2024-01-01 RX ADMIN — Medication 6: at 17:17

## 2024-01-01 RX ADMIN — SODIUM CHLORIDE 75 MILLILITER(S): 9 INJECTION, SOLUTION INTRAVENOUS at 18:50

## 2024-01-01 RX ADMIN — Medication 200 MILLIGRAM(S): at 05:06

## 2024-01-01 RX ADMIN — Medication 500000 UNIT(S): at 22:46

## 2024-01-01 RX ADMIN — APIXABAN 2.5 MILLIGRAM(S): 2.5 TABLET, FILM COATED ORAL at 21:00

## 2024-01-01 NOTE — PATIENT PROFILE ADULT - STATED REASON FOR ADMISSION
CLINICAL NUTRITION SERVICES - REASSESSMENT NOTE    RECOMMENDATIONS  1). Given recent growth trends, decrease feedings of Nestle Extensive HA = 24 Kcal/oz to goal of 130 mL/kg/day to provide 104 Kcals/kg/day & 2.65 gm/kg/day of protein.            - Oral feedings per OT recommendations.     2).  Discontinue 2 mg/kg/day of elemental Iron via Ferrous Sulfate & 5 mcg/day of Vit D.            - Initiate 0.5 mL/day of Poly-vi-Sol with Iron to meet assessed micronutrient needs.     Zenaida Rome RD, CSPCC, LD  Available via [employer]     ANTHROPOMETRICS  Weight: 4.71 kg, -3.85 z-score    Length: 52 cm; -6.38 z-score  Head Circumference: 35.5 cm; -5.65 z-score  Weight for Length: 2.46 z-score  Comments: Anthropometrics as plotted on the WHO growth chart using CGA of 4 months, 2 weeks.    Growth Assessment:    - Weight: +23 gm/day x 7 days & +19 gm/day x 14 days, exceeding goal +12 gm/day, with increase in z score.     - Length: +0.5 cm x 7 days, meeting low end of goal, with slight improvement in z score. Over the past 5 weeks averaged +0.66 cm/week, meeting goal, with improvement in z score of +0.33.    - Head Circumference: No documented growth over past week with decline in z score; previously was trending towards improvement.     - Weight for Length: Z-score stable this past week; overall remains reflective of gains in weight outpacing linear growth gains. Goal is for maintenance of z score, at a minimum, and ideally continued slow decline towards 0.    NUTRITION ORDERS  Diet: Oral feedings with cues.     Enteral Nutrition  Nestle Extensive HA = 24 Kcal/oz  Route: Oral/Nasogastric  Regimen: 81 mL every 3 hours (run over 45 minutes)  Provides 648 mL/day, 138 mL/kg/day, 110 Kcals/kg/day, 2.8 gm/kg/day protein, 3.8 mg/kg/day Iron, and 11.5 mcg/day of Vit D (Iron & Vit D intakes with supplements).       - Meeting 105% of assessed energy needs, 100% of assessed protein needs, 100% of assessed Iron needs, and 100% of assessed Vit  "D needs.    Intake/Tolerance/GI  Yesterday took 6% of feedings orally & oral feeding volumes appear to be declining over past week.  Stooling with 2-6 occurrences/day (documented as brown to yellow; soft in consistency) and no documented emesis over past week.     Average intake over past week provided an estimated intake of 137 mL/kg/day, 110 Kcals/kg/day and 2.8 gm/kg/day of protein; meeting 100% of his previously assessed energy needs (>100% of his newly assessed energy needs) and 100% of assessed minimum protein needs.     Nutrition Related Medical History: Prematurity (born at 23 1/7 weeks), need for respiratory support (currently 1/8 LPM via NC), \"recurrent NEC\", PDA - s/p device closure on 4/3, On 7/31 xray, \"Osseous structures are stable with healing rib fractures.\"    NUTRITION-RELATED MEDICAL UPDATES  GT planning - UGI and contrast enema today.     NUTRITION-RELATED LABS  Reviewed      NUTRITION-RELATED MEDICATIONS  Reviewed & include: Diuril (every 12 hours), Ferrous Sulfate (1.8 mg/kg/day elemental Iron), 5 mcg/day of Vit D, & Lasix (Mon only)    ASSESSED NUTRITION NEEDS:    -Energy: ~105 Kcals/kg/day (decreased based on average intakes and growth trends)    -Protein: 2.5-3 gm/kg/day      -Fluid: Per Medical Team; minimum 100 mL/kg/day for hydration needs    -Micronutrients: 10-15 mcg/day of Vit D and 3-4 mg/kg/day (total) of Iron - with feedings    PEDIATRIC NUTRITION STATUS VALIDATION  Patient does not currently meet the criteria for diagnosing malnutrition.     EVALUATION OF PREVIOUS PLAN OF CARE:   Monitoring from previous assessment:    Macronutrient Intakes: Appears hypercaloric.     Micronutrient Intakes: Appear acceptable at this time.     Anthropometric Measurements: See above.    Previous Goals:   1). Meet 100% assessed energy & protein needs via oral feedings/nutrition support - Partially met.   2). Weight gain of 12 gm/day (to maintain current z score) with linear growth of 0.5-1 cm/week - " Met for linear growth only; exceeded for wt gain.    3). Receive appropriate Vitamin D and Iron intakes - Met.    Previous Nutrition Diagnosis:   Predicted suboptimal nutrient intakes related to transition to PO as evidenced by taking <40% feedings PO with reliance on gavage to meet >60% assessed energy needs.    Evaluation: Completed    NUTRITION DIAGNOSIS:  Predicted suboptimal excessive energy intake related to current nutrition support orders as evidenced by regimen meeting >100% of newly assessed energy needs with wt gain exceeding goal/outpacing linear growth gains.    INTERVENTIONS  Nutrition Prescription  Meet 100% assessed energy & protein needs via feedings with age-appropriate growth.     Implementation:  Enteral Nutrition (see above), Oral Feedings (per OT recommendations)     Goals  1). Meet 100% assessed energy & protein needs via oral feedings/nutrition support.  2). Weight gain of 11 gm/day (to maintain current z score) with linear growth of 0.5-1 cm/week.   3). Receive appropriate Vitamin D and Iron intakes.    FOLLOW UP/MONITORING  Macronutrient intakes, Micronutrient intakes, and Anthropometric measurements     sob

## 2024-01-07 NOTE — PATIENT PROFILE ADULT - EQUIPMENT CURRENTLY USED AT HOME
Continue current medication  Recommend daily exercise- swimming at the [facility]  Call for any change     yes

## 2024-02-01 PROBLEM — J44.9 CHRONIC OBSTRUCTIVE PULMONARY DISEASE, UNSPECIFIED COPD TYPE: Status: ACTIVE | Noted: 2019-06-26

## 2024-02-01 PROBLEM — J44.89 ASTHMA-COPD OVERLAP SYNDROME: Status: ACTIVE | Noted: 2019-12-16

## 2024-02-01 PROBLEM — J96.11 CHRONIC RESPIRATORY FAILURE WITH HYPOXIA AND HYPERCAPNIA: Status: ACTIVE | Noted: 2019-12-16

## 2024-02-01 NOTE — ASSESSMENT
[FreeTextEntry1] : -  Mr. 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 He recently received his 3rd COVID-19 booster on 1/12/24, which was followed by development of arm pain, dyspnea, and cough. Wife had to place him on BiPAP during the day for several days along with increased oxygen use. He did not require to be hospitalized. She gave him prednisone 40 mg daily for 2 days. He stayed in bed for a few days with recovery. Currently he reports feeling well and is back to baseline. No dyspnea at rest or with regular exertion and reports stable dyspnea with heavy exertion. No wheezing, coughing, or chest tightness. Does not really use his supplemental oxygen. Wife reports that oxygen saturation drops to 89-90% after walking up a flight of stairs. He remains on mepolizumab injections monthly, last received 1/26/24. He is now off prednisone. He is adherent with Breo Ellipta 200-25 mcg and Incruse Ellipta. He also takes montelukast. He is adherent with BiPAP (IPAP 18, EPAP 8) every night. Oxygen saturation remains 95-97% on room air at rest, with desaturation to about 89-90% with activity. Previous allergy testing positive for dust. He has not been needing to use fluticasone or azelastine nasal sprays. Last pulmonary hospitalizations were August 2022, May 2022, Sept 2021, and May 2021. Last ED visit was in Nov 2022. Last steroids was in Dec 2022.  1. 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): - Keep off prednisone at this time - Continue Mepolizumab injections every month - Continue Breo Ellipta 200-25 mcg 1 inhalation daily, rinse after use - Incruse Ellipta 1 inhalation daily - Montelukast 10 mg at bedtime - Continue montelukast 10 mg at bedtime - Hold off on azithromycin therapy while on ciprofloxacin to avoid QTc prolongation - Albuterol inhaler prn - Declining pulmonary rehab at this time - Recommend increased exercise and physical activity at home as tolerates - Check complete PFTs with bronchodilator testing with next follow-up - PFTs (May 2022) with severe obstruction, increased TLC and RV consistent with air trapping and dynamic hyperinflation. There is moderately reduced diffusing capacity. No significant change compared to Dec 2020 - 6-minute walk distance in May 2022 173 meters using 2L NC, improved from 148 meters in Dec 2020 - EOT May 2022 with no hypoxemia at rest (97%), but with exercise hypoxemia to 85% while walking at 1.4 MPH requiring 2 liters NC - Hold off on BLVR given improved symptoms and no recent exacerbations - Recent ABG in March 2023 with pH 7.38, pCO2 47, pO2 68, SaO2 94% - Resolved eosinophilia with mepolizumab. IgE previously elevated to 118 in Dec 2019 with allergy testing positive for house dust. Aspergillus IgE negative. Alpha-1 antitrypsin normal and HIV negative  2. Chronic hypoxemia and hypercapnic respiratory failure: - Likely due to asthma/COPD - Continue supplemental oxygen with nasal cannula 2-3 LPM with exercise, goal SpO2>88% - BiPAP every night, IPAP 18, EPAP 8  3. Abnormal CT Chest - CT Chest in June 2020 with interval predominant resolution of the previously seen LLL consolidation with minimal residual atelectasis. New RLL area of linear atelectasis - Hold off on further imaging at this time  4. Bilateral leg edema: - Improved with low salt diet - 2D-echo in August 2021 with mild segmental LV systolic dysfunction with hypokinesis of the apical inferior, apical septum, apical lateral, basal inferior, mid anterior, and mid inferior walls. Also with mild diastolic dysfunction - Continue cardiac meds, including apixaban, aspirin, rosuvastatin, metoprolol - Continue low salt diet, keep legs elevated and compression stockings as needed - No evidence of pulmonary hypertension on prior 2D-echo  5. Upper airway cough syndrome: - No significant cough currently, not requiring to take fluticasone or azelastine nasal sprays  6. HCM: - Up to date with influenza, pneumococcal, and COVID-19 vaccinations - Recommend Shingrix vaccination, especially while on mepolizumab therapy - Will also consider RSV vaccine  7. Follow-up in May/June or sooner PRN

## 2024-02-01 NOTE — HISTORY OF PRESENT ILLNESS
[Never] : never [TextBox_4] : Mr. 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  He recently received his 3rd COVID-19 booster on 1/12/24, which was followed by development of arm pain, dyspnea, and cough. Wife had to place him on BiPAP during the day for several days along with increased oxygen use. He did not require to be hospitalized. She gave him prednisone 40 mg daily for 2 days. He stayed in bed for a few days with recovery. Currently he reports feeling well. He is back to baseline. He denies any dyspnea at rest or with regular exertion and reports stable dyspnea with heavy exertion. No wheezing, coughing, or chest tightness. He does not really use his supplemental oxygen. Wife reports that oxygen saturation drops to 89-90% after walking up a flight of stairs. He remains on mepolizumab injections monthly, last received 1/26/24. He is now off prednisone. He is adherent with Breo Ellipta 200-25 mcg and Incruse Ellipta. He also takes montelukast. He is adherent with BiPAP (IPAP 18, EPAP 8) every night from 11pm until 9am. He also wears oxygen during the day with activity, such as walking up the steps. Oxygen saturation remains at 93-95% on room air at rest, with desaturation to about 88% with activity. With 2L NC, oxygen saturation is in the upper 90s. He uses the oxygen intermittently as he prefers not to use. Oxygen saturation currently after walking to the bathroom is 92-93%. Previous allergy testing positive for dust. Has Ramírez at home for rugs in house. Also has 2 air purifiers. He has not been needing to use fluticasone or azelastine nasal sprays. Last pulmonary hospitalizations were August 2022, May 2022, Sept 2021, and May 2021. Last ED visit was in Nov 2022. Last steroids was in Dec 2022. He remains on Jardiance and Ozempic for his diabetes. He also takes apixaban 2.5 mg bid, aspirin 81, metoprolol, and rosuvastatin.  He has not went for pulmonary rehab. Declining physical therapy at home or pulmonary rehab at this time. Has weights, stationary bike, treadmill, will try to start using.  ABG (March 2023) with pH 7.38, pCO2 47, pO2 68, HCO3 28, SaO2 94%.  2D-ECHO Dec 2021 with mild diastolic dysfunction, normal LV systolic function, no significant valvular disease, and no pulmonary hypertension (estimated RVSP is 28).   PFTs (May 2022) with stable severe obstruction. Increased TLC and RV consistent with dynamic hyperinflation and air trapping. Diffusion capacity is moderately reduced.  6MWD (May 2022) improved to 173 meters from 148 meters in Dec 2022.  EXERCISE OXIMETRY (May 2022) with normal oxygen saturation at rest (97%), but developed hypoxemia to 85% while walking at 1.4 MPH requiring 2L NC with recovery to 90%.   CT CHEST June 2020 with interval improvement and near resolution of LLL pneumonia with minimal residual atelectasis. There is new linear atelectasis in the RLL. Stable emphysema.  Regarding his smoking history, he quit smoking in the 1980s, used to smoke 1 ppd for 30 years. Was smoking marijuana about 3-4 joints 3x/week until 2017. No alcohol use. No other drug use

## 2024-02-01 NOTE — PHYSICAL EXAM
[No Acute Distress] : no acute distress [Well Nourished] : well nourished [Well Developed] : well developed [Normal Oropharynx] : normal oropharynx [Normal Appearance] : normal appearance [Supple] : supple [No Neck Mass] : no neck mass [No JVD] : no jvd [Normal Rate/Rhythm] : normal rate/rhythm [Normal Pulses] : normal pulses [Normal S1, S2] : normal s1, s2 [No Murmurs] : no murmurs [No Resp Distress] : no resp distress [No Acc Muscle Use] : no acc muscle use [Benign] : benign [Not Tender] : not tender [Soft] : soft [Normal Gait] : normal gait [No Clubbing] : no clubbing [No Cyanosis] : no cyanosis [No Edema] : no edema [FROM] : FROM [No Focal Deficits] : no focal deficits [Cranial Nerves Intact] : cranial nerves intact [No Motor Deficits] : no motor deficits [Oriented x3] : oriented x3 [Normal Affect] : normal affect [TextBox_54] : trace pedal edema [TextBox_68] : decreased air entry bilaterally; no wheezing

## 2024-03-07 NOTE — DISCHARGE NOTE PROVIDER - NSDCCAREPROVSEEN_GEN_ALL_CORE_FT
April 24, 2024      Leti Steele  [address] [address] [place] [id number]        Dear ,    At [facility], your health and wellness are our primary concern. That is why we are following up on your most recent abnormal Pap smear and positive high-risk Human Papillomavirus (HPV) test.    Please call [phone] to schedule an appointment for your recommended follow-up Colposcopy (this cannot be scheduled through [facility]) and Ob/Gyn consult  at your earliest convenience.      If you have completed the appointment outside of the [facility] system, please have the records forwarded to our office. We will update your chart for your provider to review before your next annual wellness visit.     Thank you for choosing [facility]!      Sincerely,    Your [facility] Care Team   Pop Stephenson

## 2024-03-09 NOTE — DISCHARGE NOTE PROVIDER - NSDCMRMEDTOKEN_GEN_ALL_CORE_FT
Acute Sinusitis in Children: Care Instructions  Overview     Acute sinusitis is an inflammation of the mucous membranes inside the nose and sinuses. Sinuses are the hollow spaces in your child's skull around the eyes and nose. Acute sinusitis often follows a cold from a viral infection. Acute sinusitis causes nose symptoms that include mucus that drains from the nose or the back of the throat along with a stuffy or blocked nose. It also causes a cough. Other symptoms can include a fever, a headache, face pain, and bad breath.  In most cases, acute sinusitis gets better on its own in 7 to 10 days. But some mild symptoms may last longer. If there is a bacterial infection, antibiotics are often needed.  Follow-up care is a key part of your child's treatment and safety. Be sure to make and go to all appointments, and call your doctor if your child is having problems. It's also a good idea to know your child's test results and keep a list of the medicines your child takes.  How can you care for your child at home?  Use saline (saltwater) nasal washes to help keep your child's nasal passages open and wash out mucus and allergens.  You can buy saline nose washes at a grocery store or drugstore. Follow the instructions on the package.  You can make your own at home. Add 1 teaspoon of non-iodized salt and 1 teaspoon of baking soda to 2 cups of distilled or boiled and cooled water. Fill a squeeze bottle or a nasal cleansing pot (such as a neti pot) with the nasal wash. Then gently put the tip into your child's nostril, and have your child lean over the sink. Your child's mouth should be open as you gently squirt the liquid into the nose. Repeat on the other side.  If your child is too young or not able to do saline nasal washes, you can use over-the-counter saline nasal drops or sprays. A soft rubber bulb syringe can also be used to help clean out mucus.  If needed, give acetaminophen (Tylenol) or ibuprofen (Advil, Motrin)  for fever, pain, or fussiness. Read and follow all instructions on the label. Do not give aspirin to anyone younger than 20. It has been linked to Reye syndrome, a serious illness.  If the doctor prescribed antibiotics for your child, give them as directed. Do not stop using them just because your child feels better. Your child needs to take the full course of antibiotics.  Be careful with cough and cold medicines. Don't give them to children younger than 6, because they don't work for children that age and can even be harmful. For children 6 and older, always follow all the instructions carefully. Make sure you know how much medicine to give and how long to use it. And use the dosing device if one is included.  Be careful when giving your child over-the-counter cold or flu medicines and Tylenol at the same time. Many of these medicines have acetaminophen, which is Tylenol. Read the labels to make sure that you are not giving your child more than the recommended dose. Too much acetaminophen (Tylenol) can be harmful.  Make sure your child rests and drinks plenty of fluids. Keep your child home if they have a fever.  Place a cool-mist humidifier by your child's bed or close to your child. This may make it easier for your child to breathe. Follow the directions for cleaning the machine.  Keep your child away from smoke. Do not smoke or let anyone else smoke around your child or in your house.  When should you call for help?   Call your doctor now or seek immediate medical care if:    Your child has new or worse swelling, redness, or pain in their face or around one or both of their eyes.     Your child has double vision or a change in their vision.     Your child has a high fever.     Your child has a severe headache and a stiff neck.     Your child has mental changes, such as feeling confused or much less alert.     Your child has trouble breathing.     Your child has nausea and vomiting that is ongoing.     Your  "child has extreme fussiness or crying that can't be comforted.   Watch closely for changes in your child's health, and be sure to contact your doctor if:    Your child is not getting better as expected.   Where can you learn more?  Go to https://[email or web address].net/patiented  Enter L628 in the search box to learn more about \"Acute Sinusitis in Children: Care Instructions.\"  Current as of: February 28, 2023               Content Version: 13.8    2104-1353 [employer].   Care instructions adapted under license by your healthcare professional. If you have questions about a medical condition or this instruction, always ask your healthcare professional. [employer] disclaims any warranty or liability for your use of this information.      Dear Ramiro Vieyra    After reviewing your responses, I've been able to diagnose you with Acute sinusitis with symptoms > 10 days.      Based on your responses and diagnosis, I have prescribed   Orders Placed This Encounter   Medications     amoxicillin-clavulanate (AUGMENTIN) 875-125 MG tablet     Sig: Take 1 tablet by mouth 2 times daily     Dispense:  14 tablet     Refill:  0      to treat your symptoms. I have sent this to your pharmacy.?     It is also important to stay well hydrated, get lots of rest and take over-the-counter decongestants,?tylenol?or ibuprofen if you?are able to?take those medications per your primary care provider to help relieve discomfort.?     It is important that you take?all of?your prescribed medication even if your symptoms are improving after a few doses.? Taking?all of?your medicine helps prevent the symptoms from returning.?     If your symptoms worsen, you develop severe headache, vomiting, high fever (>102), or are not improving in 7 days, please contact your primary care provider for an appointment or visit any of our convenient Walk-in Care or Urgent Care Centers to be seen which can be found on our website?here.?     Thanks " again for choosing?us?as your health care partner,?   ?  Aline Quiroz, [facility]?   Thank you for choosing us for your care. I have placed an order for a prescription so that you can start treatment. View your full visit summary for details by clicking on the link below. Your pharmacist will able to address any questions you may have about the medication.     If you're not feeling better within 5-7 days, please schedule an appointment.  You can schedule an appointment right here in [facility], or call [phone]  If the visit is for the same symptoms as your eVisit, we'll refund the cost of your eVisit if seen within seven days.     aspirin 81 mg oral delayed release tablet: 1 tab(s) orally once a day  azithromycin 250 mg oral tablet: 1 tab(s) orally Monday, Wednesday, and Friday  Breo Ellipta 200 mcg-25 mcg/inh inhalation powder: 1 puff(s) inhaled once a day  clopidogrel 75 mg oral tablet: 1 tab(s) orally once a day  CoQ10 300 mg oral capsule: 1 cap(s) orally once a day  Fish Oil oral capsule: 1 cap(s) orally once a day  Incruse Ellipta 62.5 mcg/inh inhalation powder: 1 puff(s) inhaled every 24 hours  Jardiance 25 mg oral tablet: 1 tab(s) orally once a day (in the morning)  losartan 25 mg oral tablet: 1 tab(s) orally once a day  metFORMIN 1000 mg oral tablet: 1 tab(s) orally 2 times a day w/food  Metoprolol Tartrate 25 mg oral tablet: 0.5 tab(s) orally 2 times a day  montelukast 10 mg oral tablet: 1 tab(s) orally once a day (at bedtime)  Nucala 100 mg subcutaneous injection: 100 milligram(s) subcutaneous every 4 weeks    *Last given 4/30/21*  predniSONE 5 mg oral tablet: 1 tab(s) orally once a day  rosuvastatin 20 mg oral tablet: 1 tab(s) orally once a day (at bedtime)  tamsulosin 0.4 mg oral capsule: 1 cap(s) orally once a day (at bedtime)  Ventolin HFA 90 mcg/inh inhalation aerosol: 2 puff(s) inhaled every 6 hours, As Needed  Vitamin B Complex 100: 1 tab(s) orally once a day  Vitamin C: 1 tab(s) orally once a day  Vitamin D3: 1 tab(s) orally once a day  Zinc: 1 tab(s) orally once a day   aspirin 81 mg oral delayed release tablet: 1 tab(s) orally once a day  Breo Ellipta 200 mcg-25 mcg/inh inhalation powder: 1 puff(s) inhaled once a day  clopidogrel 75 mg oral tablet: 1 tab(s) orally once a day  CoQ10 300 mg oral capsule: 1 cap(s) orally once a day  Fish Oil oral capsule: 1 cap(s) orally once a day  Incruse Ellipta 62.5 mcg/inh inhalation powder: 1 puff(s) inhaled every 24 hours  Jardiance 25 mg oral tablet: 1 tab(s) orally once a day (in the morning)  losartan 25 mg oral tablet: 1 tab(s) orally once a day  metFORMIN 1000 mg oral tablet: 1 tab(s) orally 2 times a day w/food  Metoprolol Tartrate 25 mg oral tablet: 0.5 tab(s) orally 2 times a day  montelukast 10 mg oral tablet: 1 tab(s) orally once a day (at bedtime)  Nucala 100 mg subcutaneous injection: 100 milligram(s) subcutaneous every 4 weeks    *Last given 4/30/21*  predniSONE 5 mg oral tablet: 1 tab(s) orally once a day  rosuvastatin 20 mg oral tablet: 1 tab(s) orally once a day (at bedtime)  tamsulosin 0.4 mg oral capsule: 1 cap(s) orally once a day (at bedtime)  Ventolin HFA 90 mcg/inh inhalation aerosol: 2 puff(s) inhaled every 6 hours, As Needed  Vitamin B Complex 100: 1 tab(s) orally once a day  Vitamin C: 1 tab(s) orally once a day  Vitamin D3: 1 tab(s) orally once a day  Zinc: 1 tab(s) orally once a day   aspirin 81 mg oral delayed release tablet: 1 tab(s) orally once a day  Breo Ellipta 200 mcg-25 mcg/inh inhalation powder: 1 puff(s) inhaled once a day  clopidogrel 75 mg oral tablet: 1 tab(s) orally once a day  CoQ10 300 mg oral capsule: 1 cap(s) orally once a day  Fish Oil oral capsule: 1 cap(s) orally once a day  Incruse Ellipta 62.5 mcg/inh inhalation powder: 1 puff(s) inhaled every 24 hours  Jardiance 25 mg oral tablet: 1 tab(s) orally once a day (in the morning)  losartan 25 mg oral tablet: 1 tab(s) orally once a day  Medrol Dosepak 4 mg oral tablet: Please take as instructed    Day 1: 2 Tabs (8 mg) before breakfast, 1 Tab (4 mg) after lunch and after dinner, and 2 Tabs (8 mg) at bedtime  Day 2: 1 Tab (4 mg) before breakfast, after lunch, and after dinner and 2 Tabs (8 mg) at bedtime  Day 3: 1 Tab (4 mg) before breakfast, after lunch, after dinner, and at bedtime  Day 4: 1 Tab (4 mg) before breakfast, after lunch, and at bedtime  Day 5: 1 Tab (4 mg) before breakfast and at bedtime  Day 6: 1 Tab (4 mg) before breakfast  metFORMIN 1000 mg oral tablet: 1 tab(s) orally 2 times a day w/food  Metoprolol Tartrate 25 mg oral tablet: 0.5 tab(s) orally 2 times a day  Nucala 100 mg subcutaneous injection: 100 milligram(s) subcutaneous every 4 weeks    *Last given 4/30/21*  rosuvastatin 20 mg oral tablet: 1 tab(s) orally once a day (at bedtime)  tamsulosin 0.4 mg oral capsule: 1 cap(s) orally once a day (at bedtime)  Ventolin HFA 90 mcg/inh inhalation aerosol: 2 puff(s) inhaled every 6 hours, As Needed  Vitamin B Complex 100: 1 tab(s) orally once a day  Vitamin C: 1 tab(s) orally once a day  Vitamin D3: 1 tab(s) orally once a day  Zinc: 1 tab(s) orally once a day

## 2024-03-11 NOTE — HISTORY OF PRESENT ILLNESS
[FreeTextEntry1] : YEU COTTO is being seen for a initial nursing assessment visit. Pt into [facility] for wound on the Right Great toe which was discovered by the patient's podiatrist (Angel Jasmine DPM, located in [place], [phone]) at a routine visit on 3/6/24. Pt prescribed Silvadeen 1% to be applied daily, dry dressing. Pt advised to follow up with ID Dr. العلي and / or wound care. Pt is followed by Dr. العلي for recurring osteomyelitis of the Left Femur from a compound fracture which occurred 50 years ago. Patient accompanied by spouse.

## 2024-03-11 NOTE — PHYSICAL EXAM
[2 x 2] : 2 x 2  [4 x 4] : 4 x 4  [0] : left 0 [1+] : left 1+ [Skin Ulcer] : ulcer [Alert] : alert [Oriented to Person] : oriented to person [Oriented to Place] : oriented to place [Calm] : calm [Ankle Swelling (On Exam)] : not present [] : not present [Varicose Veins Of Lower Extremities] : not present [Purpura] : no purpura  [Petechiae] : no petechiae [de-identified] : calm , accompanied by his wife [de-identified] : CAD , CHF ,  [de-identified] : Hammer toes  [de-identified] : DFU 2 dorsal right hallux , s/p partial nail avulsion  [de-identified] : IDDM with neuropathy  [FreeTextEntry1] : Right Hallux [FreeTextEntry2] : 1.3 [FreeTextEntry3] : 0.7 [FreeTextEntry4] : 0.1 [de-identified] : Serosanguinous  [de-identified] : Dry, callused [de-identified] : Alginate  Ag  [de-identified] : Mechanically cleansed with 0.9% Normal Saline Micropore  Tape [de-identified] : Right Dorsalis Pedis  +1   Left Dorsalis Pedis   +1  Extremity Temperature:  Warm  to touch Extremity Color:  Normal  Capillary Refill:  Brisk <3 seconds [TWNoteComboBox4] : Small [TWNoteComboBox5] : No [TWNoteComboBox6] : False [de-identified] : No [de-identified] : None [de-identified] : None [de-identified] : No [de-identified] : 100% [de-identified] : Every other day [de-identified] : Primary Dressing

## 2024-03-11 NOTE — PLAN
[FreeTextEntry1] : Patient sent for  x rays , MRI , patient is to see his vascular surgeon this week . Pending MRI patient may need surgical intervention Spent 30 minutes for patient care and medical decision making. Patient was told if there are signs of infection ( fever, chills, nausea, vomiting ) or changes in the condition of the wound ( pain , cellulitis , purulent drainage or odor ) they should proceed to the ER as soon as possible

## 2024-03-11 NOTE — ASSESSMENT
[Verbal] : Verbal [Written] : Written [Patient] : Patient [Demo] : Demo [Verbalizes knowledge/Understanding] : Verbalizes knowledge/understanding [Good - alert, interested, motivated] : Good - alert, interested, motivated [Dressing changes] : dressing changes [Foot Care] : foot care [Pressure relief] : pressure relief [Skin Care] : skin care [Signs and symptoms of infection] : sign and symptoms of infection [Nutrition] : nutrition [Patient responsibility to plan of care] : patient responsibility to plan of care [How and When to Call] : how and when to call [] : Yes [Glycemic Control] : glycemic control [Stable] : stable [Home] : Home [Cane] : Cane [Not Applicable - Long Term Care/Home Health Agency] : Long Term Care/Home Health Agency: Not Applicable [FreeTextEntry2] : Infection Prevention Wound care and Dressing changes Promote and Restore optimal skin integrity Nutrition and Wound Healing  Offloading and Pressure relief Protect and Guard wound site  Maintain acceptable levels of Pain Compliance R/T Elevation of Lower Extremities [FreeTextEntry3] : Initial Visit [FreeTextEntry4] : Patient currently being followed by Vascular MD Morro Buckley. Patient has an appt. on Wednesday, 3/13/24 for Vascular appt and testing.  Last Vascular visit 9/25/23. RASHIDA Berumen assessed wound site and discussed treatment plan.  ROSALIAM ordered Xrays, MRI  - Requisitions given to pt, - Authorization submitted for MRI. ID Consult for next visit - request submitted.  Culture swab taken by RASHIDA Berumen,  Sent to Lab.  Patient verbalized understanding of all discussed. Patient to return to [facility] in One week.  [facility] instructions given to pt and spouse, along with one week worth of dressing supplies. Task sent for supplies.

## 2024-03-11 NOTE — REVIEW OF SYSTEMS
[Skin Wound] : skin wound [Joint Stiffness] : joint stiffness [Dry Skin] : dry skin [Fever] : no fever [Chills] : no chills [Eye Pain] : no eye pain [Loss Of Hearing] : no hearing loss [Shortness Of Breath] : no shortness of breath [Wheezing] : no wheezing [Abdominal Pain] : no abdominal pain [Anxiety] : no anxiety [Easy Bleeding] : no tendency for easy bleeding [FreeTextEntry4] : accompanied by his wife [FreeTextEntry5] : CAD , CHF, HTN , HLD [FreeTextEntry6] : COPD [FreeTextEntry9] : OM of the femur , chronic , on Cipro  [de-identified] : Dorsal right hallux nail bed , DFU 2  [de-identified] : IDVINAY [de-identified] : KHADIJAH [de-identified] : patient on Eligius

## 2024-03-13 NOTE — HISTORY OF PRESENT ILLNESS
[FreeTextEntry1] : Patient is an 83-year-old male with history significant for diabetes, diabetic neuropathy, hypertension, COPD, and peripheral arterial disease s/p bilateral iliac stents, left popliteal stent and bilateral SFA stents with chronic occlusion of the right popliteal and posterior tibial arteries.  Patient reports right great toe wound with purulent drainage.  Patient is currently on Cipro for treatment of osteomyelitis.  Denies fever or chills.  Denies rest pain.

## 2024-03-13 NOTE — PHYSICAL EXAM
[Normal Breath Sounds] : Normal breath sounds [Normal Rate and Rhythm] : normal rate and rhythm [2+] : left 2+ [0] : right 0 [Oriented to Place] : oriented to place [JVD] : no jugular venous distention  [Ankle Swelling (On Exam)] : not present [Varicose Veins Of Lower Extremities] : not present [] : not present [Abdomen Masses] : No abdominal masses [de-identified] : No acute distress noted

## 2024-03-13 NOTE — ASSESSMENT
[FreeTextEntry1] : Patient with severe peripheral vascular disease.  Patient has developed a right first toe ulceration.  This is slowly healing.  Awaiting MRI to rule out osteo-.  Arterial Dopplers show significant disease.  If the patient has osteomyelitis or the wound does not heal, the patient will require right lower extremity angiogram.  I had a long conversation with the patient and the patient's wife.  Continue Eliquis.  Follow-up in 2 weeks.

## 2024-03-15 NOTE — PATIENT PROFILE ADULT - PRO INTERPRETER NEED 2
Informed patient of low HGB results.  Start Fe supplement daily . Take at different time than PNV, on an empty stomach (usually before bed is easiest) and increase fluids. Monitor for constipation as that is a side effect and take stool softener as needed.   Glucose labs are 93  Patient states understanding     English

## 2024-03-18 PROBLEM — E11.51 CONTROLLED DIABETES MELLITUS WITH PERIPHERAL CIRCULATORY DISORDER: Status: ACTIVE | Noted: 2022-04-27

## 2024-03-18 NOTE — PLAN
[FreeTextEntry1] : Patient to see vascular on Wednesday , currently the wound is closed and we are awaiting the final radiology report on the MRI . Continue wound care  Spent 20 minutes for patient care and medical decision making..  Patient was told if there are signs of infection ( fever, chills, nausea, vomiting ) or changes in the condition of the wound ( pain , cellulitis , purulent drainage or odor ) they should proceed to the ER as soon as possible

## 2024-03-18 NOTE — ASSESSMENT
[Foot Care] : foot care [Skin Care] : skin care [Signs and symptoms of infection] : sign and symptoms of infection [Nutrition] : nutrition [How and When to Call] : how and when to call [Labs and Tests] : labs and tests [Off-loading] : off-loading [Patient responsibility to plan of care] : patient responsibility to plan of care [Verbal] : Verbal [Demo] : Demo [Patient] : Patient [Spouse] : Spouse [Good - alert, interested, motivated] : Good - alert, interested, motivated [Demonstrates independently] : demonstrates independently [Stable] : stable [Cane] : Cane [Home] : Home [Not Applicable - Long Term Care/Home Health Agency] : Long Term Care/Home Health Agency: Not Applicable [] : No [FreeTextEntry2] : Infection Prevention Foot and nail care Nutrition and wound healing Pt Demonstrates use of both nonpharmacological and pharmacological pain relief strategies. MRI  [FreeTextEntry4] : Xray reviewed by DPM.  Patient seen by Vascular 3/13/24 who states patient will need Angiogram if wound (+) OM or wound does not heal.  MRI completed 3/15/24 but not finalized.  Plan: Wait for MRI and DPM will contact Dr. Buckley to discuss plan of care. Discussed possible surgical intervention of the right great toe wound, including but not limited to; Amputation. F/U 1 Week

## 2024-03-18 NOTE — REVIEW OF SYSTEMS
[Fever] : no fever [Chills] : no chills [Eye Pain] : no eye pain [Shortness Of Breath] : no shortness of breath [Loss Of Hearing] : no hearing loss [Wheezing] : no wheezing [Abdominal Pain] : no abdominal pain [Joint Stiffness] : joint stiffness [Dry Skin] : dry skin [Skin Wound] : skin wound [Anxiety] : no anxiety [Easy Bleeding] : no tendency for easy bleeding [FreeTextEntry5] : CAD , CHF, HTN , HLD [FreeTextEntry4] : accompanied by his wife [FreeTextEntry6] : COPD [FreeTextEntry9] : OM of the femur , chronic , on Cipro  [de-identified] : Dorsal right hallux nail bed , DFU 2 , currently closed with no soi  [de-identified] : IDDM with neuropathy  [de-identified] : KHADIJAH [de-identified] : patient on Eligius

## 2024-03-18 NOTE — PHYSICAL EXAM
[4 x 4] : 4 x 4  [0] : right 0 [1+] : left 1+ [Ankle Swelling (On Exam)] : not present [Varicose Veins Of Lower Extremities] : not present [] : not present [Petechiae] : no petechiae [Purpura] : no purpura  [Alert] : alert [Skin Ulcer] : ulcer [Oriented to Place] : oriented to place [Oriented to Person] : oriented to person [de-identified] : calm , accompanied by his wife [Calm] : calm [de-identified] : Hammer toes  [de-identified] : CAD , CHF ,  [de-identified] : IDDM with neuropathy  [de-identified] : DFU 2 dorsal right hallux , s/p partial nail avulsion , currently closed with no soi  [FreeTextEntry1] : Dorsal right hallux nail bed [FreeTextEntry3] : 0.5 [FreeTextEntry2] : 0.7 [de-identified] : small serosanguineous [FreeTextEntry4] : 0.1 [de-identified] : none [de-identified] : other [de-identified] : none [de-identified] : none [de-identified] : intact [de-identified] : 100% [de-identified] : none [de-identified] : none [de-identified] : Alginate Ag [de-identified] : Mechanically cleansed with sterile gauze and normal saline 0.9% Dry Dressing [de-identified] : 3x Weekly [de-identified] : Primary Dressing

## 2024-03-19 NOTE — DIETITIAN INITIAL EVALUATION ADULT. - PROBLEM/PLAN-8
DISPLAY PLAN FREE TEXT [Postmenopausal] : The patient is postmenopausal [Menopause Age____] : age at menopause was [unfilled] [History of Hormone Replacement Treatment] : has a history of hormone replacement treatment [de-identified] : HRT x 5 years

## 2024-03-20 NOTE — ASSESSMENT
[Medication Management] : medication management [Arterial/Venous Disease] : arterial/venous disease [FreeTextEntry1] : Patient with severe peripheral vascular disease.  Patient has developed a right first toe ulceration.  This is slowly healing.    Awaiting MRI to rule out osteo-.  Arterial Dopplers show significant disease.  If the patient has osteomyelitis or the wound does not heal, the patient will require right lower extremity angiogram.  I had a long conversation with the patient and the patient's wife.  Continue Eliquis.  Follow-up in 2 weeks.

## 2024-03-20 NOTE — PHYSICAL EXAM
[Normal Breath Sounds] : Normal breath sounds [Normal Rate and Rhythm] : normal rate and rhythm [2+] : left 2+ [0] : left 0 [Oriented to Place] : oriented to place [JVD] : no jugular venous distention  [Ankle Swelling (On Exam)] : not present [Varicose Veins Of Lower Extremities] : not present [] : not present [de-identified] : No acute distress noted [Abdomen Masses] : No abdominal masses [de-identified] : Closed ulcer dorsal right hallux nail bed 0.7 x 0.5 x 0.1cm

## 2024-03-20 NOTE — HISTORY OF PRESENT ILLNESS
[FreeTextEntry1] : Patient is an 83-year-old male with history significant for diabetes, diabetic neuropathy, hypertension, COPD, and peripheral arterial disease s/p bilateral iliac stents, left popliteal stent and bilateral SFA stents with chronic occlusion of the right popliteal and posterior tibial arteries.  Patient reports right great toe wound with purulent drainage.  Patient is currently on Cipro for treatment of osteomyelitis in the hip.  Patient is currently awaiting results from wound culture, right foot x-ray, and MRI of right foot.  Denies fever or chills.  Denies rest pain.

## 2024-03-25 NOTE — VITALS
[Pain related to present condition?] : The patient's  pain is not related to present condition. [] : No [de-identified] : 0/10

## 2024-03-25 NOTE — HISTORY OF PRESENT ILLNESS
[FreeTextEntry1] : Chronic OM distal right hallux , currently stable and no acute findings , pt seen along with Dr العلي , patient is on Cipro for chronic OM of the left leg

## 2024-03-25 NOTE — PHYSICAL EXAM
[4 x 4] : 4 x 4  [0] : right 0 [1+] : left 1+ [Ankle Swelling (On Exam)] : not present [Varicose Veins Of Lower Extremities] : not present [] : not present [Purpura] : no purpura  [Petechiae] : no petechiae [Skin Ulcer] : ulcer [Oriented to Person] : oriented to person [Alert] : alert [Oriented to Place] : oriented to place [Calm] : calm [de-identified] : calm , accompanied by his wife [de-identified] : Hammer toes  [de-identified] : DFU 2 dorsal right hallux , s/p partial nail avulsion , currently closed with no soi , chronic OM of the distal tuft of the right hallux  [de-identified] : CAD , CHF ,  [de-identified] : IDDM with neuropathy  [FreeTextEntry2] : 0.7cm [FreeTextEntry3] : 0.5cm [FreeTextEntry1] : Dorsal right hallux nail bed [FreeTextEntry4] : 0.1cm [de-identified] : small serosanguineous [de-identified] : other [de-identified] : none [de-identified] : none [de-identified] : none [de-identified] : intact [de-identified] : none [de-identified] : 100% [de-identified] : none [de-identified] : Alginate Ag [de-identified] : Mechanically cleansed with sterile gauze and normal saline 0.9% Dry Dressing [de-identified] : 3x Weekly [de-identified] : Primary Dressing

## 2024-03-25 NOTE — ASSESSMENT
[Verbal] : Verbal [Demo] : Demo [Patient] : Patient [Spouse] : Spouse [Good - alert, interested, motivated] : Good - alert, interested, motivated [Demonstrates independently] : demonstrates independently [Dressing changes] : dressing changes [Foot Care] : foot care [Skin Care] : skin care [Signs and symptoms of infection] : sign and symptoms of infection [Nutrition] : nutrition [How and When to Call] : how and when to call [Labs and Tests] : labs and tests [Pain Management] : pain management [Off-loading] : off-loading [Patient responsibility to plan of care] : patient responsibility to plan of care [Home] : Home [Stable] : stable [Cane] : Cane [Not Applicable - Long Term Care/Home Health Agency] : Long Term Care/Home Health Agency: Not Applicable [] : No [FreeTextEntry3] : No changes in measurement, (+) Chronic OM [FreeTextEntry2] : Infection Prevention Foot and nail care Nutrition and wound healing Pt Demonstrates use of both nonpharmacological and pharmacological pain relief strategies. [FreeTextEntry4] : -Xray and MRI reviewed by DPGEORGI, (+) chronic OM. -Patient seen by Vascular Dr. Buckley on 3/20/24 (treating severe PVD of RLE) who states patient will need Angiogram if wound (+) OM or wound does not heal.  -Seen by Dr. العلي during todays visit for right Hallux toe (follows patient for LLE abscess and is on chronic PO Cipro). Will continue PO Cipro, no need for other antibiotic at this point since the wound is dry and no active signs of acute infection. No need for further follow up for Right Hallux at this point. -Discussed S/S of acute infection and when to seek medical attention. -Discussed possible surgical intervention of the right great toe wound, including but not limited to; Amputation. -Advised to avoid swimming/soaking in water. -Patients wife performs dressing changes at home. -F/U 2 weeks

## 2024-03-25 NOTE — PLAN
[FreeTextEntry1] : Patient to see vascular next Monday . Currently the wound is stable and smaller , because of the patient history and PVD he is high risk for surgical intervention . Suggest monitoring the toe and if there are acute signs of infection we will address the issue at that time All risks , benefits , complications have been discussed with the patient . All his questions have been thoroughly answered . Patient agrees with the plan and understands all the risks and benefits Patient was told if there are signs of infection ( fever, chills, nausea, vomiting ) or changes in the condition of the wound ( pain , cellulitis , purulent drainage or odor ) they should proceed to the ER as soon as possible  patient high risk for limb loss and life threatening sepsis Spent 20 minutes for patient care and medical decision making.

## 2024-03-25 NOTE — REVIEW OF SYSTEMS
[Chills] : no chills [Fever] : no fever [Eye Pain] : no eye pain [Loss Of Hearing] : no hearing loss [Shortness Of Breath] : no shortness of breath [Wheezing] : no wheezing [Abdominal Pain] : no abdominal pain [Joint Stiffness] : joint stiffness [Skin Wound] : skin wound [Dry Skin] : dry skin [Anxiety] : no anxiety [Easy Bleeding] : no tendency for easy bleeding [FreeTextEntry4] : accompanied by his wife [FreeTextEntry5] : CAD , CHF, HTN , HLD , PVD [FreeTextEntry6] : COPD [FreeTextEntry9] : OM of the femur , chronic , on Cipro  [de-identified] : Dorsal right hallux nail bed , DFU 2 , currently closed with no soi  , MRI + OM [de-identified] : IDDM with neuropathy  [de-identified] : KHADIJAH [de-identified] : patient on Eligius

## 2024-03-26 NOTE — ED ADULT TRIAGE NOTE - BP NONINVASIVE SYSTOLIC (MM HG)
56F, on ASA81 for pain relief? 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 Was initially found unresponsive at 21:00, intubated at 23:00 at OSH, then txfrd. Given ddavp at OSH. On arrival patient w/ PERRL, +cough/gag, 2/5 spontaneous movement in LUE, o/w no movement. 1 unit PLTs given and R frontal EVD placed-high pressure. PLTs/Coags wnl.  CT Head 3/26: Interval placement of a right frontal approach ventriculostomy catheter without significant change in size or configuration of the ventricular system. Similar-appearing diffuse subarachnoid hemorrhage, as discussed.  CT Angio HEAD:Extensive subarachnoid hemorrhage throughout the basilar cisterns, bilateral sylvian fissures, and along the bilateral medial frontal lobes, right greater than left. Prominent temporal horns of the lateral ventricles. CTA HEAD:Irregular posterolaterally projecting 2.9 x 2.4 x 2.6 mm right supraclinoid ICA aneurysm, likely ruptured. No additional aneurysms identified. No vessel occlusion, proximal stenosis or evidence for vasospasm at this time. CTA NECK:No evidence of significant stenosis or occlusion. No evidence of dissection. 2.5 cm heterogeneous left thyroid nodule. This can be followed up with nonemergent thyroid ultrasound for further characterization.
134

## 2024-03-27 PROBLEM — M86.60 CHRONIC OSTEOMYELITIS: Status: ACTIVE | Noted: 2022-05-02

## 2024-03-27 NOTE — HISTORY OF PRESENT ILLNESS
[FreeTextEntry1] : 3/25/24 Patient has a small dry ulcer on tip of R hallux for which had a biopsy showing chronic osteomyelitis. No discharge or cellulitis. No fever or chills. Discussed with pt and podiatry that we can just watch it. Since wound is dry with no pathogen in culture and OM is chronic most likely is ischemic not infectious.   PMH:  84yo man with PMH of HTN, COPD on home O2, CAD s/p CABG, PVD s/p stents in b/l legs and left femoral fracture more than 50 years ago, was admitted at [facility] in 12/2021 with left leg pain on the site of old fracture after CT showed possible intramedullary abscess. He had pain and infection in the old fracture area at least 3-4 times in the past, had multiple surgeries and drainage of area with a long course of antibiotic treatment. MRI showed collection, intraosseous abscess S/P IR drain placement on 12/27 Completed 6 weeks of ceftriaxone 2gm with PICC line in 1/2022 He was seen in the clinic and was started on suppressive ciprofloxacin for good oral bioavailability and also based on the culture with a sensitive Enterobacter. MRI recently done showed 12.9cm collection with OM.  He stopped cipro sometimes last year and discharge came back  He was hospitalized in march2023 again with pain and discharge and repeat Imaging showed a large phlegmon and some collection, no intervention was done this time.  Wound discharge sent for culture twice both times with skin shayy, CoNS and corynebacterium, he was treated for 6 weeks with cefepime until 4/16.  Also during hospital stay had GI bleed s/p EGD on 3/13 with gastritis and peptic ulcer, s/p colonoscopy 3/17 no active bleeding but H pylori was positive, for which treated with pylera.  No fever or chills, no open wound or drainage in the area.

## 2024-03-27 NOTE — ASSESSMENT
[FreeTextEntry1] : 84yo man with PMH of HTN, COPD on home O2, CAD s/p CABG, PVD s/p stents in b/l legs and left femoral fracture more than 50 years ago, S/p CT showing a large collection, has been on suppressive cipro after being treated for  OM with 6 weeks of IV antibiotics.  Also has a dry ulcer on tip of R hallux, biopsy with chronic OM and MRI with OM in tuft of distal phalanx.   Since no infection, and it is ischemic ulcer will watch it off antibiotics.  Will follow with podaitry, if beocmes infectious will start ABx, Will continue suppressive cipro for left thigh collection and OM.  Patient was given the opportunity to ask questions and all questions were answered to their satisfaction. Counseling included lab results, differential diagnosis, treatment options, risks and benefits, lifestyle changes, current condition, medications and dose adjustments. The patient verbalized understanding. [Treatment Education] : treatment education [Treatment Adherence] : treatment adherence [Rx Dose / Side Effects] : Rx dose/side effects [Risk Reduction] : risk reduction [Universal Precautions] : universal precautions [Drug Interactions / Side Effects] : drug interactions/side effects [Anticipatory Guidance] : anticipatory guidance

## 2024-04-01 NOTE — ASSESSMENT
[FreeTextEntry1] : Patient with severe peripheral vascular disease.  Patient has developed a right first toe ulceration.  Not significant to be healing  MRI shows possibility of osteo with an open wound present.-.  Arterial Dopplers show significant disease.   Will schedule the patient for right lower extremity angiogram and intervention.  I had a long conversation with the patient and the patient's wife.  Continue Eliquis.  Follow-up in 2 weeks. [Arterial/Venous Disease] : arterial/venous disease [Medication Management] : medication management

## 2024-04-01 NOTE — PHYSICAL EXAM
[JVD] : no jugular venous distention  [Normal Breath Sounds] : Normal breath sounds [Normal Rate and Rhythm] : normal rate and rhythm [2+] : left 2+ [0] : right 0 [Varicose Veins Of Lower Extremities] : not present [Ankle Swelling (On Exam)] : not present [] : not present [Abdomen Masses] : No abdominal masses [Oriented to Place] : oriented to place [de-identified] : Closed ulcer dorsal right hallux nail bed 0.7 x 0.5 x 0.1cm [de-identified] : No acute distress noted

## 2024-04-08 NOTE — PLAN
[FreeTextEntry1] : Patient  to have revascularization on Wednesday . We will address the ulcer next week and plan for possible distal symes and bone biopsy Patient was told if there are signs of infection ( fever, chills, nausea, vomiting ) or changes in the condition of the wound ( pain , cellulitis , purulent drainage or odor ) they should proceed to the ER as soon as possible Spent 20 minutes for patient care and medical decision making. continue wound care

## 2024-04-08 NOTE — PHYSICAL EXAM
[4 x 4] : 4 x 4  [0] : left 0 [1+] : left 1+ [Ankle Swelling (On Exam)] : not present [Varicose Veins Of Lower Extremities] : not present [] : not present [Purpura] : no purpura  [Petechiae] : no petechiae [Skin Ulcer] : ulcer [Alert] : alert [Oriented to Person] : oriented to person [Oriented to Place] : oriented to place [Oriented to Time] : oriented to time [Calm] : calm [de-identified] : calm , accompanied by his wife [de-identified] : CAD , CHF ,  [de-identified] : Hammer toes  [de-identified] : DFU 3 dorsal right hallux , s/p partial nail avulsion , currently probes to bone no acute  soi , chronic OM of the distal tuft of the right hallux  [de-identified] : IDDM with neuropathy  [de-identified] : DPM shaved dry, callused periwound 4/8/24 [FreeTextEntry1] : Right Hallux - Distal nail bed [FreeTextEntry2] : 0.4 [FreeTextEntry3] : 0.4 [FreeTextEntry4] : 0.3 (Probes to bone) [de-identified] : sanguinous [de-identified] : Intact, Dry [de-identified] : Alginate Ag [de-identified] : Mechanically cleansed with sterile gauze and normal saline 0.9% Toe sock Paper Tape [TWNoteComboBox4] : Small [TWNoteComboBox5] : No [TWNoteComboBox6] : Diabetic [de-identified] : No [de-identified] : None [de-identified] : None [de-identified] : 100% [de-identified] : No [de-identified] : Primary Dressing [de-identified] : 3x Weekly

## 2024-04-08 NOTE — PROVIDER CONTACT NOTE (CRITICAL VALUE NOTIFICATION) - PERSON GIVING RESULT:
TO DO LIST:     1, You will need to see a psychologist and/or psychiatrist and/or therapist for clearance for weight loss surgery Esme Lazcano, Ph.D- [facility] [phone].        2. EKG good for 6 months and Chest XRAY is good for 12 months      3. You will need an upper endoscopy with GI provider through your primary care physician you need a colonoscopy as well      4. Medical Clearance Note for Surgery - Please obtain a written note from your primary care physician clearing you to have bariatric surgery due to medical issues. Please have the results sent to us.  2 weeks prior to surgery    Nutrition Goals:  Continue to work on increasing water to 64oz per day. Use the [employer] ag to help you track your intake throughout the day.    Choose a protein shake that has <250 calories, <10g sugar and at least 20 grams of protein. Save SlimFast Original shakes for the Liver Shrinking Diet.   Start tracking in M[employer]. Aim to log for 3-4 days before your next visit so we can review them together.     EXERCISE PLAN:  An exercise plan was discussed with the doctor for Adela to increase exercise goal to 3-4 days a week.      GOALS: HOME routine: 3-4 x per week: Cross train between the rowing machine, strength and conditioning, isometrics, stability, and kettle bell exercises for functional strength.     Contrave (naltrexone HCL/bupropion HCL) Patient Instructions     You will need to gradually increase your dose of Contrave.  :      Week 1: 1 tablet (naltrexone 8 mg/bupropion 90 mg) once daily in the AM     Week 2: 1 tablet in the AM and 1 tablet in the PM      Week 3: 2 tablets in the AM and 1 tablet in the PM      Week 4: 2 tablets in the AM and 2 tablets in the PM      Swallow tablets whole.  Do not cut, crush or chew tablets.  Do not take Contrave with high-fat meals. It may increase your risk of seizures.  If you miss a dose of Contrave, wait until your next regular  Issac/ Claire time to take it.  Do not take more than 2 tablets at the same time or more than 4 tablets in 1 day.        Common Side Effects     The most common side effects of Contrave include: nausea, constipation, headache, vomiting, dizziness, trouble sleeping, dry mouth, and diarrhea.    If nausea occurs, it usually goes away in a few weeks as your body adjusts to the medication.  To help with nausea, always take your medication with food.  Complex carbohydrates like crackers or toast may work the best.  If symptoms persist, try taking a tablespoon of simethicone (Maalox) or bismuth subsalicylate (Pepto-Bismol) immediately prior to taking your medication for a few days to see if this helps.         Serious Side Effects:       Contrave can rarely cause serious side effects including: seizures, increases in heart rate or blood pressure, visual problems (angle-closure glaucoma), liver damage or hepatitis, and allergic reactions.  Weight loss while on Contrave can also cause people with type 2 diabetes to have low blood sugar.  People who regularly take opioid pain medications or other forms of opioids can experience sudden opioid withdrawal.      Contrave can cause also cause a serious side effect of suicidal thoughts or actions. One of the ingredients in Contrave is bupropion HCl. Bupropion has caused some people to have suicidal thoughts or actions or unusual changes in behavior. Bupropion may increase suicidal thoughts or actions in some children, teenagers, and young adults within the first few months of treatment. If you already have depression or other mental illnesses, taking bupropion may cause it to get worse, especially within the first few months of treatment.     Stop taking Contrave and call a healthcare provider right away if you have any of the following symptoms, especially if they are new, worse, or worry you: thoughts about suicide or dying; attempts to commit suicide; depression; anxiety; feeling very  agitated or restless; panic attacks; trouble sleeping (insomnia); irritability; acting aggressive, being angry or violent; acting on dangerous impulses; an extreme increase in activity and talking (dmitriy); other unusual changes in behavior or mood.       Your insurance plan might not cover this medication.       To ensure your prescription is sent to your home call [facility], at [phone], or visit [email or web address] to set up an account online. Please visit [email or web address] for more information about the medication and prescription delivery.

## 2024-04-08 NOTE — REVIEW OF SYSTEMS
[Fever] : no fever [Chills] : no chills [Eye Pain] : no eye pain [Loss Of Hearing] : no hearing loss [Shortness Of Breath] : no shortness of breath [Wheezing] : no wheezing [Abdominal Pain] : no abdominal pain [Joint Stiffness] : joint stiffness [Skin Wound] : skin wound [Dry Skin] : dry skin [Anxiety] : no anxiety [Easy Bleeding] : no tendency for easy bleeding [FreeTextEntry4] : accompanied by his wife [FreeTextEntry5] : CAD , CHF, HTN , HLD , PVD [FreeTextEntry6] : COPD [FreeTextEntry9] : OM of the femur , chronic , on Cipro  [de-identified] : Dorsal right hallux nail bed , DFU 3 , , probes to bone , no acute  soi  , MRI + OM [de-identified] : IDDM with neuropathy  [de-identified] : KHADIJAH [de-identified] : patient on Eligius

## 2024-04-09 PROBLEM — I73.9 PVD (PERIPHERAL VASCULAR DISEASE): Status: ACTIVE | Noted: 2017-07-24

## 2024-04-12 NOTE — PHYSICAL THERAPY INITIAL EVALUATION ADULT - NAME OF DISCHARGE PLANNER
Medical screening examination initiated.  I have conducted a focused provider triage encounter, findings are as follows:    Brief history of present illness:  10 y/o female hit on top of head with cinder block. No loc. Top of head has abrasion/laceration    There were no vitals filed for this visit.    Pertinent physical exam:  alert, nonlabored, ambulatory laceration/abrasion to top of head    Brief workup plan:  exam    Preliminary workup initiated; this workup will be continued and followed by the physician or advanced practice provider that is assigned to the patient when roomed.  
Patient received semisupine in bed in NAD; agreeable to participate in OT evaluation. +left hand elevated, +dressing/ace wrap. +Garcia.
Arlene, SW

## 2024-04-15 PROBLEM — E11.621 CHRONIC DIABETIC ULCER OF RIGHT FOOT DETERMINED BY EXAMINATION: Status: ACTIVE | Noted: 2024-01-01

## 2024-04-15 PROBLEM — L84 CALLUS OF FOOT: Status: ACTIVE | Noted: 2024-01-01

## 2024-04-15 PROBLEM — E11.40 CHRONIC PAINFUL DIABETIC NEUROPATHY: Status: ACTIVE | Noted: 2024-01-01

## 2024-04-15 PROBLEM — L97.512 CHRONIC ULCER OF GREAT TOE OF RIGHT FOOT WITH FAT LAYER EXPOSED: Status: ACTIVE | Noted: 2024-01-01

## 2024-04-15 NOTE — PLAN
[FreeTextEntry1] : continue to monitor , patient to return to vascular next week . Will discuss possible sx options with vascular and determine if the patient can heal from bone procedure Spent 20 minutes for patient care and medical decision making.Continue wound care

## 2024-04-15 NOTE — HISTORY OF PRESENT ILLNESS
[FreeTextEntry1] : open ulcer dorsal distal right hallux , probes to bone , clinical OM , no acute soi , patient had vascular procedure last week but was unable to open the vessels

## 2024-04-15 NOTE — REVIEW OF SYSTEMS
[Fever] : no fever [Chills] : no chills [Eye Pain] : no eye pain [Loss Of Hearing] : no hearing loss [Shortness Of Breath] : no shortness of breath [Wheezing] : no wheezing [Abdominal Pain] : no abdominal pain [Joint Stiffness] : joint stiffness [Skin Wound] : skin wound [Dry Skin] : dry skin [Anxiety] : no anxiety [Easy Bleeding] : no tendency for easy bleeding [FreeTextEntry4] : accompanied by his wife [FreeTextEntry5] : CAD , CHF, HTN , HLD , PVD [FreeTextEntry6] : COPD [FreeTextEntry9] : OM of the femur , chronic , on Cipro  [de-identified] : Dorsal right hallux nail bed , DFU 3 , , probes to bone , no acute  soi  , MRI + OM [de-identified] : IDDM with neuropathy  [de-identified] : KHADIJAH [de-identified] : patient on Eligius

## 2024-04-15 NOTE — PHYSICAL EXAM
[4 x 4] : 4 x 4  [0] : left 0 [1+] : left 1+ [Ankle Swelling (On Exam)] : not present [Varicose Veins Of Lower Extremities] : not present [] : not present [Purpura] : no purpura  [Petechiae] : no petechiae [Skin Ulcer] : ulcer [Alert] : alert [Oriented to Person] : oriented to person [Oriented to Place] : oriented to place [Oriented to Time] : oriented to time [Calm] : calm [de-identified] : calm , accompanied by his wife [de-identified] : CAD , CHF ,  [de-identified] : Hammer toes  [de-identified] : DFU 3 dorsal right hallux , s/p partial nail avulsion , currently probes to bone no acute  soi , chronic OM of the distal tuft of the right hallux  [de-identified] : IDDM with neuropathy  [FreeTextEntry1] : Right Hallux - Distal nail bed [FreeTextEntry2] : 0.2 [FreeTextEntry3] : 0.2 [FreeTextEntry4] : 0.3 (Probes to bone) [de-identified] : serous [de-identified] : Intact, Dry callus [de-identified] : pale granulation tissue  [de-identified] : Alginate Ag [de-identified] : Mechanically cleansed with sterile gauze and normal saline 0.9% Toe sock Paper Tape [TWNoteComboBox4] : Small [TWNoteComboBox5] : No [TWNoteComboBox6] : Diabetic [de-identified] : No [de-identified] : None [de-identified] : None [de-identified] : 100% [de-identified] : No [de-identified] : 3x Weekly [de-identified] : Primary Dressing

## 2024-04-15 NOTE — ASSESSMENT
[Verbal] : Verbal [Demo] : Demo [Patient] : Patient [Spouse] : Spouse [Good - alert, interested, motivated] : Good - alert, interested, motivated [Demonstrates independently] : demonstrates independently [Dressing changes] : dressing changes [Foot Care] : foot care [Skin Care] : skin care [Pressure relief] : pressure relief [Signs and symptoms of infection] : sign and symptoms of infection [Surgery] : surgery [Nutrition] : nutrition [How and When to Call] : how and when to call [Pain Management] : pain management [Off-loading] : off-loading [Patient responsibility to plan of care] : patient responsibility to plan of care [Glycemic Control] : glycemic control [] : Yes [Stable] : stable [Home] : Home [Cane] : Cane [Not Applicable - Long Term Care/Home Health Agency] : Long Term Care/Home Health Agency: Not Applicable [FreeTextEntry2] : Infection Prevention Wound care and Dressing changes Promote and Restore optimal skin integrity Nutrition and Wound Healing  Offloading and Pressure relief Protect and Guard wound site  Maintain acceptable levels of Pain  [FreeTextEntry4] : Pt s/p RLE revasc on 4/10/24, F/U on 4/22/24 Spouse of patient performs dressing changes proficiently. Patient verbalized understanding of all discussed. Patient to return to [facility] in 2 weeks. PO cipro in progress, no side effects to note.

## 2024-04-23 NOTE — ED PROVIDER NOTE - CROS ED CONS ALL NEG
negative... Paciente: REINA ANG  Profesional que asiste al paciente: Todd Sanon  Dolor abdominal en los adultos  Abdominal Pain, Adult    El dolor en el abdomen (dolor abdominal) puede tener muchas causas. A menudo, el dolor abdominal no es grave y mejora sin tratamiento o con tratamiento en la casa. Sin embargo, a veces el dolor abdominal es grave.    El médico le hará preguntas sobre evgeny antecedentes médicos y le hará un examen físico para tratar de determinar la causa del dolor abdominal.    Siga estas instrucciones en romero casa:         Medicamentos    Use los medicamentos de venta shiela y los recetados solamente senthil se lo haya indicado el médico.  No tome un laxante a menos que se lo haya indicado el médico.        Instrucciones generales    Controle romero afección para detectar cualquier cambio.  Jess suficiente líquido senthil para mantener la orina de color amarillo pálido.  Concurra a todas las visitas de seguimiento senthil se lo haya indicado el médico. [place] es importante.    Comuníquese con un médico si:  El dolor abdominal cambia o empeora.  No tiene apetito o baja de peso sin proponérselo.  Está estreñido o tiene diarrea adrianna más de 2 o 3 caesar.  Tiene dolor cuando orina o defeca.  El dolor abdominal lo despierta de noche.  El dolor empeora con las comidas, después de comer o con determinados alimentos.  Tiene vómitos y no puede retener nada de lo que ingiere.  Tiene fiebre.  Observa gigi en la orina.    Solicite ayuda inmediatamente si:  El dolor no desaparece tan pronto senthil el médico le dijo que era esperable.  No puede dejar de vomitar.  El dolor se siente solo en zonas del abdomen, senthil el lado derecho o la parte inferior izquierda del abdomen. Si se localiza en la ruby derecha, posiblemente podría tratarse de apendicitis.  Las heces son sanguinolentas o de color carmita, o de aspecto alquitranado.  Tiene dolor intenso, cólicos o distensión abdominal.  Tiene signos de deshidratación, senthil los siguientes:    Orina de color oscuro, muy escasa o falta de orina.  Labios agrietados.  Sequedad de boca.  Ojos hundidos.  Somnolencia.  Debilidad.  Tiene dificultad para respirar o dolor en el pecho.    Resumen  A menudo, el dolor abdominal no es grave y mejora sin tratamiento o con tratamiento en la casa. Sin embargo, a veces el dolor abdominal es grave.  Controle romero afección para detectar cualquier cambio.  Use los medicamentos de venta shiela y los recetados solamente senthil se lo haya indicado el médico.  Comuníquese con un médico si el dolor abdominal cambia o empeora.  Busque ayuda de inmediato si tiene dolor intenso, cólicos o distensión abdominal.    NOTAS ADICIONALES E INSTRUCCIONES    Please follow up with your Primary MD in 24-48 hr.  Seek immediate medical care for any new/worsening signs or symptoms.     Document Released: 9/27/2006 Document Revised: 2/5/2021 Document Reviewed: 4/27/2020  [employer] Interactive Patient Education ©2019 [employer] This information is not intended to replace advice given to you by your health care provider. Make sure you discuss any questions you have with your health care provider.

## 2024-04-25 NOTE — H&P ADULT - NSHPPHYSICALEXAM_GEN_ALL_CORE
CONSTITUTIONAL: Well groomed, no apparent distress  EYES: No conjunctival or scleral injection, non-icteric  ENMT: Oral mucosa with moist membranes.   NECK: Supple, symmetric and without tracheal deviation   RESP: No respiratory distress, no use of accessory muscles; CTA b/l, no WRR  CV: irregular rate and rhythm, +S1S2, no peripheral edema  GI: Soft, NT, ND  LYMPH: No cervical LAD or tenderness  MSK: Normal ROM without pain, no joint pain   SKIN: No rashes or ulcers noted  NEURO: Sensation intact in upper and lower extremities b/l to light touch   PSYCH: Appropriate insight/judgment; A+O x 3, mood and affect appropriate CONSTITUTIONAL: Well groomed, no apparent distress  EYES: No conjunctival or scleral injection, non-icteric  ENMT: Oral mucosa with moist membranes.   NECK: Supple, symmetric and without tracheal deviation   RESP: No respiratory distress, no use of accessory muscles; CTA b/l, no WRR  CV: RRR, +S1S2, no peripheral edema  GI: Soft, NT, ND  LYMPH: No cervical LAD or tenderness  MSK: Normal ROM without pain, no joint pain   SKIN: + R big toe dressing c/d/i  NEURO: Sensation intact in upper and lower extremities b/l to light touch   PSYCH: Appropriate insight/judgment; A+O x 3, mood and affect appropriate

## 2024-04-25 NOTE — ED ADULT NURSE NOTE - NSFALLUNIVINTERV_ED_ALL_ED
Bed/Stretcher in lowest position, wheels locked, appropriate side rails in place/Call bell, personal items and telephone in reach/Instruct patient to call for assistance before getting out of bed/chair/stretcher/Non-slip footwear applied when patient is off stretcher/[place] to call system/Physically safe environment - no spills, clutter or unnecessary equipment/Purposeful proactive rounding/Room/bathroom lighting operational, light cord in reach

## 2024-04-25 NOTE — ED PROVIDER NOTE - PROGRESS NOTE DETAILS
Patient's was given 10 mg of diltiazem intravenously with heart rate going from 170 to approximately 150 an additional 10 mg intravenous diltiazem will be administered and patient will be started on diltiazem infusion beginning at 10 mg/h titrate thereafter. Patient feeling somewhat better at this time and will be admitted to telemetry.

## 2024-04-25 NOTE — CONSULT NOTE ADULT - PROBLEM SELECTOR RECOMMENDATION 9
Patient seen and evaluated  Discussed condition with patient  Nothing to culture at this time  Cont local wound care,   no clinical signs of infection  Will follow while in house

## 2024-04-25 NOTE — H&P ADULT - NSHPREVIEWOFSYSTEMS_GEN_ALL_CORE
REVIEW OF SYSTEMS:  CONSTITUTIONAL: No fever/chills or appetite changes.  HENMT: No HA, lightheadedness/dizziness  RESPIRATORY: No cough, wheezing, hemoptysis; No shortness of breath.  CARDIOVASCULAR: No chest pain, palpitations.  GASTROINTESTINAL: No abdominal or epigastric pain. No nausea or vomiting; No diarrhea or constipation.  GENITOURINARY: No dysuria, changes in frequency, hematuria, or incontinence  NEUROLOGICAL: Baseline strength. Sensation intact bilaterally.  SKIN: No itching, rashes  MUSCULOSKELETAL: No joint pain or swelling; No muscle, back, or extremity pain REVIEW OF SYSTEMS:  CONSTITUTIONAL: No fever/chills or appetite changes.  HENMT: No HA, lightheadedness/dizziness  RESPIRATORY: No cough, wheezing, hemoptysis; + shortness of breath.  CARDIOVASCULAR: + chest pressure; No chest pain, palpitations.  GASTROINTESTINAL: No abdominal or epigastric pain. No nausea or vomiting; No diarrhea or constipation.  GENITOURINARY: No dysuria, changes in frequency, hematuria, or incontinence  NEUROLOGICAL: Baseline strength. Sensation intact bilaterally.  SKIN: + R big toe wound  MUSCULOSKELETAL: No joint pain or swelling; No muscle, back, or extremity pain

## 2024-04-25 NOTE — CONSULT NOTE ADULT - ASSESSMENT
Patient is a 83-year-old M with history of COPD on home O2 PRN, coronary artery disease s/p CABG,  hypertension, diabetes, hyperlipidemia, atrial fibrillation on Eliquis as well as chronic osteomyelitis who presents to the emergency department at [facility] complaining of rapid heart rate. Cardiology consulted for afib with rvr.    #Afib w/rvr  - Initial ECG showed sinus tachycardia @ 116bpm, however repeat ECG showed afib with RVR  - S/p Cardizem 10mg IVP x2 and Cardizem gtt @10cc/hr in ED; patient converted to normal sinus  - Would give Lopressor 50mg PO q8h with starting dose now, then discontinue cardizem gtt one hour after first dose  -Would recommend beginning amiodarone to ensure continued sinus rhythm, however wife and patient hesitant to add another medication and are apprehensive of side effects; conversation ongoing  - Continue Eliquis 2.5mg BID  - Monitor and replete lytes, keep K>4, Mg>2  - Recommend checking TFT's    #Elevated troponin  - Trop 507.9; trend to peak  - ECG without acute ischemic changes, patient denies chest pain    #HTN  - controlled  - Lopressor 50mg q8h    #HLD  - Continue statin    - No meaningful evidence of volume overload   - Other cardiovascular workup will depend on clinical course.  - All other workup per primary team.  - Will continue to follow.             Patient is a 83-year-old M with history of COPD on home O2 PRN, coronary artery disease s/p CABG,  hypertension, diabetes, hyperlipidemia, atrial fibrillation on Eliquis as well as chronic osteomyelitis who presents to the emergency department at [facility] complaining of rapid heart rate. Cardiology consulted for afib with rvr.    #Afib w/rvr  - Initial ECG showed sinus tachycardia @ 116bpm, however repeat ECG showed afib with RVR  - S/p Cardizem 10mg IVP x2 and Cardizem gtt @10cc/hr in ED; patient converted to normal sinus  - Would give Lopressor 50mg PO q8h with starting dose now, then discontinue cardizem gtt one hour after first dose  -Would recommend beginning amiodarone to ensure continued sinus rhythm, however wife and patient hesitant to add another medication and are apprehensive of side effects; conversation ongoing  - Continue Eliquis 2.5mg BID  - Monitor and replete lytes, keep K>4, Mg>2  - Recommend checking TFT's  - Please get TTE    #Elevated troponin  - Trop 507.9; trend to peak  - ECG without acute ischemic changes, patient denies chest pain    #HTN  - controlled  - Lopressor 50mg q8h    #HLD  - Continue statin    - No meaningful evidence of volume overload   - Other cardiovascular workup will depend on clinical course.  - All other workup per primary team.  - Will continue to follow.             Patient is a 83-year-old M with history of COPD on home O2 PRN, coronary artery disease s/p CABG,  hypertension, diabetes, hyperlipidemia, atrial fibrillation on Eliquis as well as chronic osteomyelitis who presents to the emergency department at [facility] complaining of rapid heart rate. Cardiology consulted for afib with rvr.    #Afib w/rvr  - Initial ECG showed sinus tachycardia @ 116bpm, however repeat ECG showed afib with RVR  - S/p Cardizem 10mg IVP x2 and Cardizem gtt @10cc/hr in ED; patient converted to normal sinus  - Would give Lopressor 50mg PO q8h with starting dose now, then discontinue cardizem gtt one hour after first dose  -Would recommend beginning amiodarone to ensure continued sinus rhythm, however wife and patient hesitant to add another medication and are apprehensive of side effects; conversation ongoing  - Continue Eliquis 2.5mg BID, dose reduced for age and renal function  - Monitor and replete lytes, keep K>4, Mg>2  - Recommend checking TFT's  - Please get TTE    #Elevated troponin  - Trop 507.9; trend to peak  - ECG without acute ischemic changes, patient denies chest pain    #HTN  - controlled  - Lopressor 50mg q8h    #HLD  - Continue statin    - No meaningful evidence of volume overload   - Other cardiovascular workup will depend on clinical course.  - All other workup per primary team.  - Will continue to follow.

## 2024-04-25 NOTE — ED PROVIDER NOTE - CLINICAL SUMMARY MEDICAL DECISION MAKING FREE TEXT BOX
Patient with rapid heart rate presumably secondary to A-fib versus SVT with chest pain either preceding or following SVT versus A-fib requiring thorough evaluation labs EKG cardiac enzymes and treatment.

## 2024-04-25 NOTE — H&P ADULT - PROBLEM SELECTOR PLAN 2
- Trop 507.9  - likely in setting of AFib w/ RVR, now converted to NSR  - f/u repeat cardiac enzymes, trend to peak  - Cardio consulted

## 2024-04-25 NOTE — ED PROVIDER NOTE - DIFFERENTIAL DIAGNOSIS
Differential diagnosis includes atrial fibrillation versus SVT versus ACS secondary to SVT versus SVT secondary to ACS. Differential Diagnosis

## 2024-04-25 NOTE — H&P ADULT - PROBLEM SELECTOR PLAN 1
- Initial ECG showed sinus tachycardia @ 116bpm, however repeat ECG showed afib with RVR  -  on admission  - S/P PO , Cardizem 10mg IVP x2 and Cardizem gtt @10cc/hr in ED; patient converted to normal sinus  - Continue Eliquis 2.5mg BID  - Per Cardio, lopressor 50mg PO q8, first dose now -> d/c cardizem gtt 1 hr following 1st dose  - f/u TFT  - Monitor and replete lytes, keep K>4, Mg>2  - Cardio consulted

## 2024-04-25 NOTE — H&P ADULT - PROBLEM SELECTOR PLAN 4
- on admission Cr 1.6, baseline Cr 1.1 (03/2023)  - IV NS @ 75cc/hr x 12 hrs  - Avoid nephrotoxics - hold home furosemide  - F/u BMP in AM

## 2024-04-25 NOTE — H&P ADULT - ATTENDING COMMENTS
83yoM PMHx AFib on Eliquis, CAD, HTN, DM, HLD, COPD, osteomyelitis presents c/o shortness of breath, chest discomfort. Admitted for AFib w/ RVR. Admit to tele. Cardio recs appreciated. PT/OT. Treatment will be dependent on clinical course.     Agree with H&P as outlined above, edited where appropriate.

## 2024-04-25 NOTE — CONSULT NOTE ADULT - ASSESSMENT
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 Found to be in Afib with RVR.    ID consulted for R 1st toe ulcer. He has chronic R big toe wound for the past few months, follows with Wound Care and seen by Dr. العلي from ID. Biopsy with chronic OM and MRI with OM in tuft of distal phalanx. Would continue to observe off antibiotics since no signs of wound infection or cellulitis, and likely ischemic ulcer. Otherwise no fever and no leukocytosis.    #Right 1st toe ulcer  #Left femoral intramedullary abscess    -continue ciprofloxacin 250mg PO BID for chronic suppression, if renal function improving tomorrow increase back to 500mg PO BID  -wound care  -follow up with ID as outpatient    Thank you for courtesy of this consult. Will follow PRN, please call ID if any questions or acute issues arise.    Discussed with the primary team.     Regla Esquivel MD  Division of Infectious Diseases   Cell [phone] between 8am and 6pm   After 6pm and weekends please call ID service at [phone].     55 minutes spent on total encounter assessing patient, examination, chart review, counseling and coordinating care by the attending physician/nurse/care manager.

## 2024-04-25 NOTE — ED ADULT NURSE NOTE - NS ED NURSE RECORD ANOTHER VITAL SIGN
Progress Note - [address] 49 y o  male MRN: @MRN   Unit/Bed#Sneha Limon 542-89 Encounter: [id number]    Per nursing report and sign out, patient had no acute issues/changes    Sadia Weeks reports today that he is 'doing pretty good', sleep was a bit rough due to nursing checks last night  Energy ok, maybe a little tired  Depression and anxiety both 0/10  No SI, HI, AVH  Looking forward to discharge    Appetite: normal  Medication side effects: No   ROS: all other systems are negative    Mental Status Evaluation:    Appearance:  age appropriate   Behavior:  pleasant, cooperative, with good eye contact   Speech:  Normal volume, regular rate and rhythm   Mood:  euthymic   Affect:  blunted       Thought Process:  Linear and goal directed   Associations: intact associations   Thought Content:  normal and appropriate   Perceptual Disturbances: no auditory or visual hallcunations   Risk Potential: Suicidal ideation - None  Homicidal ideation - None  Potential for aggression - No   Sensorium:  A&Ox3   Cognition:  grossly intact   Consciousness:  Alert & Awake   Memory:  recent and remote memory grossly intact   Attention: attention span and concentration are age appropriate           Insight:  fair   Judgment: limited   Muscle Strength  Muscle Tone normal  normal   Gait/Station: normal gait/station with good balance   Motor Activity: no abnormal movements       Vital signs in last 24 hours:    Temp:  [97 1 °F (36 2 °C)-97 8 °F (36 6 °C)] 97 6 °F (36 4 °C)  HR:  [74-87] 87  Resp:  [16-18] 16  BP: ()/(53-64) 110/64    Laboratory results:  I have personally reviewed all pertinent laboratory/tests results      Assessment/Plan   Principal Problem:    Unspecified mood (affective) disorder (HCC)  Active Problems:    Type 2 diabetes mellitus with hyperglycemia (HCC)    Nicotine abuse    Medical clearance for psychiatric admission    COVID-19      Sadia Weeks is making slow progress    Recommended Treatment:     Planned medication and treatment changes: All current active medications have been reviewed  Encourage group therapy, milieu therapy and occupational therapy  [facility] checks as unit standard unless ordered or noted otherwise    Current Facility-Administered Medications   Medication Dose Route Frequency Provider Last Rate    benztropine  0 5 mg Oral Q4H PRN Max 6/day Dylan Nolen MD      escitalopram  10 mg Oral Daily Dylan Nolen MD      fluticasone  2 spray Each Nare Daily [place], [place]      haloperidol  2 mg Oral BID Dylan Nolen MD      hydrOXYzine HCL  25 mg Oral Q6H PRN Max 4/day Dylan Nolen MD      hydrOXYzine HCL  50 mg Oral Q6H PRN Max 4/day Dylan Nolen MD      ibuprofen  200 mg Oral Q6H PRN Dylan Nolen MD      ibuprofen  400 mg Oral Q6H PRN Dylan Nolen MD      ibuprofen  600 mg Oral Q8H PRN Dylan Nolen MD      insulin lispro  1-6 Units Subcutaneous TID [facility] Dylan Nolen MD      LORazepam  1 mg Intramuscular Q6H PRN Max 3/day Dylan Nolen MD      LORazepam  1 mg Oral Q6H PRN Max 3/day Dylan Noeln MD      melatonin  3 mg Oral HS Dylan Nolen MD      metFORMIN  1,000 mg Oral BID With Meals Dylan Nolen MD      nicotine  14 mg Transdermal Daily Dylan Nolen MD      OLANZapine  5 mg Intramuscular Q3H PRN Max 3/day Dylan Nolen MD      OLANZapine  2 5 mg Oral Q4H PRN Max 6/day Dylan Nolen MD      OLANZapine  5 mg Oral Q4H PRN Max 3/day Dylan Nolen MD      OLANZapine  5 mg Oral Q3H PRN Max 3/day Dylan Nolen MD      senna-docusate sodium  1 tablet Oral Daily PRN Dylan Nolen MD      traZODone  50 mg Oral HS PRN Dylan Nolen MD         1) continue current treatment  2) Continue to support patient and engage them in the programs available as feasible and appropriate  Continue case management support and therapy  Continue discharge planning        Risks / Benefits of Treatment:    Risks, benefits, and possible side effects of medications explained to patient and patient verbalizes understanding and agreement for treatment  Counseling / Coordination of Care:    Medication changes reviewed with nursing staff  Medications, treatment progress and treatment plan reviewed with patient        Radha Pina III, DO  1/30/2022  9:17 AM Yes

## 2024-04-25 NOTE — CONSULT NOTE ADULT - SUBJECTIVE AND OBJECTIVE BOX
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 Pt reports onset of rapid heart rate this morning, HR measured 160s when he checked his pulse ox. Also endorses dyspnea exacerbated by movement. Pt reports last episode of AFib in , no episodes since then until today's presentation. Pt states that chest discomfort feels like chest pressure, no chest pain.     Pt has chronic R big toe wound for the past few months, follows w/ Wound Care. Seen by ID in [facility] on 3/25, biopsy with chronic OM and MRI with OM in tuft of distal phalanx. Plan was to observe off antibiotics since no signs of wound infection, and likely ischemic ulcer. States that he has increased sensitivity to R sole of foot, but denies pain. Denies fevers, chills, abdominal pain, N/V/D/C.         PAST MEDICAL & SURGICAL HISTORY:  Diabetes Mellitus, Type II      CAD (Coronary Artery Disease)  s/p 3v CABG ; stents [place]       Dyslipidemia      Osteomyelitis      COPD (chronic obstructive pulmonary disease)  on 2L at home and BiPAP at night; intubated       Hypertension      PVD (peripheral vascular disease)      History of PAT (paroxysmal atrial tachycardia)      Asthma with COPD      BPH (benign prostatic hyperplasia)      Acute osteomyelitis      CABG (Coronary Artery Bypass Graft)        Compound fracture  left leg      S/P primary angioplasty with coronary stent      H/O drainage of abscess  Left femur 2021          Social Hx:     FAMILY HISTORY:  Family history of diabetes mellitus (Sibling)    Family hx of lung cancer  brother,  age 82, used to smoke with pt        Allergies    No Known Allergies    Intolerances        Antibiotics:  MEDICATIONS  (STANDING):  apixaban 2.5 milliGRAM(s) Oral every 12 hours  aspirin enteric coated 81 milliGRAM(s) Oral daily  atorvastatin 80 milliGRAM(s) Oral at bedtime  ciprofloxacin     Tablet 250 milliGRAM(s) Oral every 12 hours  dextrose 10% Bolus 125 milliLiter(s) IV Bolus once  dextrose 5%. 1000 milliLiter(s) (100 mL/Hr) IV Continuous <Continuous>  dextrose 5%. 1000 milliLiter(s) (50 mL/Hr) IV Continuous <Continuous>  dextrose 50% Injectable 12.5 Gram(s) IV Push once  dextrose 50% Injectable 25 Gram(s) IV Push once  diltiazem Infusion 10 mG/Hr (10 mL/Hr) IV Continuous <Continuous>  glucagon  Injectable 1 milliGRAM(s) IntraMuscular once  insulin glargine Injectable (LANTUS) 6 Unit(s) SubCutaneous at bedtime  insulin lispro (ADMELOG) corrective regimen sliding scale   SubCutaneous at bedtime  insulin lispro (ADMELOG) corrective regimen sliding scale   SubCutaneous three times a day before meals  metoprolol tartrate 50 milliGRAM(s) Oral every 8 hours  montelukast 10 milliGRAM(s) Oral daily  sodium chloride 0.9%. 1000 milliLiter(s) (75 mL/Hr) IV Continuous <Continuous>  tamsulosin 0.4 milliGRAM(s) Oral at bedtime    MEDICATIONS  (PRN):  acetaminophen     Tablet .. 650 milliGRAM(s) Oral every 6 hours PRN Temp greater or equal to 38C (100.4F), Mild Pain (1 - 3)  albuterol    90 MICROgram(s) HFA Inhaler 2 Puff(s) Inhalation every 6 hours PRN Shortness of Breath and/or Wheezing  dextrose Oral Gel 15 Gram(s) Oral once PRN Blood Glucose LESS THAN 70 milliGRAM(s)/deciliter       REVIEW OF SYSTEMS:  CONSTITUTIONAL:  No Fever or chills  HEENT:  No sore throat or runny nose.  CARDIOVASCULAR:  (+) chest discomfort  RESPIRATORY:  No cough, (+) shortness of breath  GASTROINTESTINAL:  No nausea, vomiting or diarrhea.  GENITOURINARY:  No dysuria  MUSCULOSKELETAL:  no joint aches, no muscle pain  SKIN:  see history  NEUROLOGIC:  No headache or dizziness  PSYCHIATRIC:  No disorder of thought or mood.    Physical Exam:  Vital Signs Last 24 Hrs  T(C): 36.5 (2024 14:56), Max: 37 (2024 13:36)  T(F): 97.7 (2024 14:56), Max: 98.6 (2024 13:36)  HR: 82 (2024 14:56) (82 - 144)  BP: 107/68 (2024 14:56) (107/68 - 132/81)  BP(mean): --  RR: 18 (2024 14:56) (18 - 18)  SpO2: 94% (2024 14:56) (94% - 98%)    Parameters below as of 2024 14:56  Patient On (Oxygen Delivery Method): room air      Height (cm): 177.8 ( @ 09:14)  Weight (kg): 99.8 ( @ 09:14)  BMI (kg/m2): 31.6 ( @ 09:14)  BSA (m2): 2.17 ( @ 09:14)  GEN: NAD  HEENT: normocephalic and atraumatic.   NECK: Supple.   LUNGS: Normal respiratory effort  HEART: Regular rate and rhythm   ABDOMEN: Soft, nontender, and nondistended.    EXTREMITIES: No leg edema.  NEUROLOGIC: grossly intact.  PSYCHIATRIC: Appropriate affect .  SKIN: R 1st toe ulcer, appears dry, no drainage expressed, no swelling, erythema or tenderness    Labs:      141  |  105  |  25<H>  ----------------------------<  177<H>  4.0   |  30  |  1.60<H>    Ca    8.7      2024 10:05  Mg     2.1         TPro  6.8  /  Alb  3.4  /  TBili  0.3  /  DBili  x   /  AST  40<H>  /  ALT  49  /  AlkPhos  109                            13.8   7.27  )-----------( 223      ( 2024 10:05 )             44.6     PT/INR - ( 2024 10:05 )   PT: 11.8 sec;   INR: 1.01 ratio         PTT - ( 2024 10:05 )  PTT:29.7 sec  Urinalysis Basic - ( 2024 10:05 )    Color: x / Appearance: x / SG: x / pH: x  Gluc: 177 mg/dL / Ketone: x  / Bili: x / Urobili: x   Blood: x / Protein: x / Nitrite: x   Leuk Esterase: x / RBC: x / WBC x   Sq Epi: x / Non Sq Epi: x / Bacteria: x      LIVER FUNCTIONS - ( 2024 10:05 )  Alb: 3.4 g/dL / Pro: 6.8 g/dL / ALK PHOS: 109 U/L / ALT: 49 U/L / AST: 40 U/L / GGT: x                           RECENT CULTURES:        All imaging and other data have been reviewed.    < from: MR Foot w/wo IV Cont, Right (03.15.24 @ 10:49) >  FINDINGS:    MUSCLES AND TENDONS: There is moderate edema and atrophy throughout the   intrinsic musculature the foot. The tendons appear intact.  PLANTAR FASCIA: Intact  LIGAMENTS: Intact Lisfranc ligament. The plantar plates are preserved.  CARTILAGE and SUBCHONDRAL BONE: Focal cartilage loss the central first   metatarsophalangeal joint at the periphery the first metatarsal head with   underlying subchondral edema. The remaining cartilage surfaces are   preserved.  SYNOVIUM/JOINT FLUID: There is no joint effusion.  MARROW: There is subtle marrow edema and mild enhancement noted within   the distal tuft of the great toe distal phalanx. The remaining bone   marrow signal is relatively preserved. No cortical erosion or destruction   is noted.  NEUROVASCULAR STRUCTURES: Preserved  PERIPHERAL/ SUB-CUTANEOUS SOFT TISSUES: Mild to moderate dorsal soft   tissue swelling. No drainable fluid collection. No discrete skin wound is   noted.    IMPRESSION:  1.  Subtle marrow edema and enhancement within the distal phalangeal tuft   of the great toe. A nonspecific finding. Correlate clinically for an   adjacent skin wound which would increase the probability of osteomyelitis   in this region.  2.  DJD at the first MTP  3.  Soft tissue swelling at the forefoot is nonspecific and may reflect  cellulitis in the appropriate clinical setting. No drainable fluid   collection.      < end of copied text >

## 2024-04-25 NOTE — CONSULT NOTE ADULT - ASSESSMENT
83-year-old  black male with history of COPD coronary artery disease hypertension diabetes hyperlipidemia atrial fibrillation on Eliquis as well as osteomyelitis who presents now to the emergency department at [facility] complaining of rapid heart rate 83-year-old  black male with history of COPD coronary artery disease hypertension diabetes hyperlipidemia atrial fibrillation on Eliquis as well as osteomyelitis who presents now to the emergency department at [facility] complaining of rapid heart rate    jacy  copd  AF  OP  OA  CAD  HTN  DM  HLD  OM hx    follows with Dr Ryland ashley med  uses NIV - BIPAP night time for JACY - sleep hygiene reviewed  cardio eval - [facility] rx regimen  BP control  DM care  AF rate and rhythm control  TFT  outpatient record reviewed  proventil PRN - Singulair - copd  spoke with WIFE  on emp ABX   wound care

## 2024-04-25 NOTE — ED PROVIDER NOTE - OBJECTIVE STATEMENT
Patient is a 83-year-old  black male with history of COPD coronary artery disease hypertension diabetes hyperlipidemia atrial fibrillation on Eliquis as well as osteomyelitis who presents now to the emergency department at [facility] complaining of rapid heart rate this morning.  Patient was in his baseline state of health up until recently when over the past few days he has been experiencing mild shortness of breath more so with exertion without any associated symptoms such as cough fever chills or congestion.  This morning shortly after patient awoke he seemed to be a little bit unsteady on his feet did not fall but when wife checked his pulse using a pulse oximeter his heart rate was in the 160s.  Approximately 30 minutes later she rechecked him and his heart rate was 80 and then when she rechecked him short while after his heart rate was back up in the 160s.  Of note blood pressure was normal per wife as well as O2 saturation per wife.  At some point this morning not sure of the exact time he developed anterior chest pressure that lasted less than 30 minutes.  States he has never had chest pain before.

## 2024-04-25 NOTE — ED ADULT NURSE NOTE - OBJECTIVE STATEMENT
the patient presents to the er for what was described as "acid reflux" this am.  he states he can't describe it at as pain, only discomfort, and states it lasted a very short time, but wife became concerned because she states that his heartrate was in the 160's.  the patient states that the only reason he is here right now is because his wife made him come.  He was seen by his vascular MD yesterday for f/u (in addition to cardiac stent, he also has stents in his legs).  the patient states that he was winded a bit when walking to this appt from parking lot.  he has an iv access to his left ac placed by ems.  labs collected.  patricia denis

## 2024-04-25 NOTE — H&P ADULT - PROBLEM SELECTOR PLAN 7
- consistent carb diet  - takes 10U Lantus qHS at home -- hold home insulin  - start low dose sliding scale  - hypoglycemia protocol in place, fingersticks q ac, q hs   - goal -180 in hospital setting, adjust insulin regimen prn  - f/u HbA1c

## 2024-04-25 NOTE — H&P ADULT - HISTORY OF PRESENT ILLNESS
83yoM PMHx AFib on Eliquis, CAD, HTN, DM, HLD, COPD, osteomyelitis presents c/o shortness of breath, chest discomfort. Pt reports onset of rapid heart rate this morning, HR measured 160s when he checked his pulse ox. Also endorses dyspnea exacerbated by movement. Pt reports last episode of AFib in 2020, no episodes since then until today's presentation. Pt states that chest discomfort feels like chest pressure, no chest pain. Pt also reports chronic R big toe wound for the past few months, follows w/ Wound Care. States that he has increased sensitivity to R sole of foot, but denies pain, numbness/tingling. Denies fevers, chills, abdominal pain, N/V/D/C.     IN THE ED:  Temp 98  F ,  , /81  ,RR 18 , SpO2 94%  S/P PO , IV diltiazem 10mg x2, IV diltiazem gtt @ 10 mg/hr  Labs significant for BUN/Cr 25/1.6, troponin 507.9, pBNP 355  Imaging:   CXR wet read: no gross abnormalities

## 2024-04-25 NOTE — ED PROVIDER NOTE - OTHER FINDINGS
EKG revealed sinus tachycardia at a rate of 120 with poor R wave progression V1 V2 as well as low voltage.

## 2024-04-25 NOTE — CONSULT NOTE ADULT - SUBJECTIVE AND OBJECTIVE BOX
Patient is a 83y old  Male who presents with a chief complaint of     Patient seen and examined at bedside. He reports feeling better. Reports his shortness of breath is improved. Denies any further chest tightness.     PAST MEDICAL & SURGICAL HISTORY:  Diabetes Mellitus, Type II      CAD (Coronary Artery Disease)  s/p 3v CABG ; stents [place]       Dyslipidemia      Osteomyelitis      COPD (chronic obstructive pulmonary disease)  on 2L at home and BiPAP at night; intubated       Hypertension      PVD (peripheral vascular disease)      History of PAT (paroxysmal atrial tachycardia)      Asthma with COPD      BPH (benign prostatic hyperplasia)      Acute osteomyelitis      CABG (Coronary Artery Bypass Graft)        Compound fracture  left leg      S/P primary angioplasty with coronary stent      H/O drainage of abscess  Left femur 2021                ECHO  FINDINGS:      MEDICATIONS  (STANDING):  diltiazem Infusion 10 mG/Hr (10 mL/Hr) IV Continuous <Continuous>    MEDICATIONS  (PRN):      FAMILY HISTORY:  Family history of diabetes mellitus (Sibling)    Family hx of lung cancer  brother,  age 82, used to smoke with pt      Denies Family history of CAD or early MI      SOCIAL HISTORY:    No tobacco, Alcohol or Ddrug use    Vital Signs Last 24 Hrs  T(C): 36.7 (2024 09:14), Max: 36.7 (2024 09:14)  T(F): 98 (2024 09:14), Max: 98 (2024 09:14)  HR: 140 (2024 11:29) (114 - 144)  BP: 132/81 (2024 09:14) (132/81 - 132/81)  BP(mean): --  RR: 18 (2024 09:14) (18 - 18)  SpO2: 94% (2024 09:14) (94% - 94%)    Parameters below as of 2024 09:14  Patient On (Oxygen Delivery Method): room air        Physical Exam:  General: Well developed, well nourished, NAD  HEENT: NCAT, PERRLA, EOMI bl, moist mucous membranes   Neck: Supple, nontender, no mass  Neurology: A&Ox3, nonfocal, sensation intact   Respiratory: diffuse breath sounds bilaterally  CV: irregularly irregular, tachycardic  Abdominal: Soft, NT, ND +BSx4, no palpable masses  Extremities: No C/C/E, + peripheral pulses  Heme: No obvious ecchymosis or petechiae   Skin: warm, dry, normal color      ECG: afib w/rvr    I&O's Detail      LABS:                        13.8   7.27  )-----------( 223      ( 2024 10:05 )             44.6         141  |  105  |  25<H>  ----------------------------<  177<H>  4.0   |  30  |  1.60<H>    Ca    8.7      2024 10:05  Mg     2.1         TPro  6.8  /  Alb  3.4  /  TBili  0.3  /  DBili  x   /  AST  40<H>  /  ALT  49  /  AlkPhos  109          PT/INR - ( 2024 10:05 )   PT: 11.8 sec;   INR: 1.01 ratio         PTT - ( 2024 10:05 )  PTT:29.7 sec  Urinalysis Basic - ( 2024 10:05 )    Color: x / Appearance: x / SG: x / pH: x  Gluc: 177 mg/dL / Ketone: x  / Bili: x / Urobili: x   Blood: x / Protein: x / Nitrite: x   Leuk Esterase: x / RBC: x / WBC x   Sq Epi: x / Non Sq Epi: x / Bacteria: x      I&O's Summary

## 2024-04-25 NOTE — PATIENT PROFILE ADULT - FALL HARM RISK - RISK INTERVENTIONS

## 2024-04-25 NOTE — CONSULT NOTE ADULT - ATTENDING COMMENTS
Patient is a 83-year-old M with history of COPD on home O2 PRN, coronary artery disease s/p CABG,  hypertension, diabetes, hyperlipidemia, atrial fibrillation on Eliquis as well as chronic osteomyelitis who presents to the emergency department at [facility] complaining of rapid heart rate. Cardiology consulted for afib with rvr.    Presenting with pAF with RVR  At home he had noted some SOB, his wife checked his HR and noted it up was up to 160s so brought him to the ER, now placed on Cardizem gtt  In the ER, EKGs showed ST, PVCs then AF with RVR and NSST, now back to NSR.   Apparently he had missed his BB dose last evening as well  Trop elevated to 507, denies any symptoms at this time, but will require serial EKGs and serial trops. If uptrending, would need ACS treatment.   Now that he is back in NSR, would recommend amio for maintenance but family is hesitant.   Would place on Lopressor 50mg Q8H and stop Cardizem gtt.   Obtain TTE, TFTs.   Tele monitoring
Will continue local wound care  Will obtain MRI once patient is stable to r/o OM of the right hallux

## 2024-04-25 NOTE — H&P ADULT - ASSESSMENT
83yoM PMHx AFib on Eliquis, CAD, HTN, DM, HLD, COPD, osteomyelitis presents c/o shortness of breath, chest discomfort. Admitted for AFib w/ RVR

## 2024-04-25 NOTE — ED ADULT NURSE REASSESSMENT NOTE - NS ED NURSE REASSESS COMMENT FT1
1030:  patient received.  had an episode of "tachycardia" per wife followed by some "burning chest pain which lasted only minutes".  wife felt he was walking back from bathroom and thought he appeared to be slightly off balance which is not typical either.  EMS had placed a 20G to his left arm, labs were collected and sent.  MD went to bedside and evaluated patient and he was seen to go into a rapid afib of 166.  cardizem was ordered and given at 1039.  wife remains at bedside.  patient states he feels fine, denies any chest pain at this time.  denies any shortness of breath.  he remains alert / oriented x4.  afib seen on monitor, repeat EKG as per MD revealing sinus tach.  patricia denis.

## 2024-04-25 NOTE — CONSULT NOTE ADULT - SUBJECTIVE AND OBJECTIVE BOX
Date/Time Patient Seen:  		  Referring MD:   Data Reviewed	       Patient is a 83y old  Male who presents with a chief complaint of     Subjective/HPI   83-year-old  black male with history of COPD coronary artery disease hypertension diabetes hyperlipidemia atrial fibrillation on Eliquis as well as osteomyelitis who presents now to the emergency department at [facility] complaining of rapid heart rate  PAST MEDICAL & SURGICAL HISTORY:  Diabetes Mellitus, Type II    CAD (Coronary Artery Disease)  s/p 3v CABG ; stents placed in [place] in     Dyslipidemia    Asymptomatic Peripheral Vascular Disease    Osteomyelitis    COPD (chronic obstructive pulmonary disease)  on 2L at home and BiPAP at night; intubated     Diabetes mellitus    Hypertension    PVD (peripheral vascular disease)    History of PAT (paroxysmal atrial tachycardia)    Asthma with COPD    BPH (benign prostatic hyperplasia)    Acute osteomyelitis    CABG (Coronary Artery Bypass Graft)      Compound fracture  left leg    S/P primary angioplasty with coronary stent    H/O drainage of abscess  Left femur 2021    Asthma with COPD     BPH (benign prostatic hyperplasia)     CAD (Coronary Artery Disease) s/p 3v CABG ; stents placed in [place] in     COPD (chronic obstructive pulmonary disease) on 2L at home and BiPAP at night; intubated     Diabetes Mellitus, Type II     Dyslipidemia     History of PAT (paroxysmal atrial tachycardia)     Hypertension     Osteomyelitis     PVD (peripheral vascular disease).     PAST SURGICAL HISTORY:  CABG (Coronary Artery Bypass Graft)     Compound fracture left leg    H/O drainage of abscess Left femur 2021    S/P primary angioplasty with coronary stent.     FAMILY HISTORY:  Family hx of lung cancer, brother,  age 82, used to smoke with pt    Sibling  Still living? Unknown  Family history of diabetes mellitus, Age at diagnosis: Age Unknown.     Social History:  · Substance use	No  · Social History (marital status, living situation, occupation, and sexual history)	 and living with spouse     Tobacco Screening:  · Core Measure Site	Yes  · Has the patient used tobacco in the past 30 days?	No    Risk Assessment:    Present on Admission:  Deep Venous Thrombosis	no  Pulmonary Embolus	no  Urinary Catheter	no  Central Venous Catheter/PICC Line	no  Surgical Site Incision	no  Pressure Ulcer(s)	no     HIV Screening:  · In accordance with [place] State law, we offer every patient who comes to our ED an HIV test. Would you like to be tested today?	Unable to answer due to medical condition/unresponsive/etc...        Medication list         MEDICATIONS  (STANDING):  diltiazem Infusion 10 mG/Hr (10 mL/Hr) IV Continuous <Continuous>    MEDICATIONS  (PRN):         Vitals log        ICU Vital Signs Last 24 Hrs  T(C): 36.7 (2024 09:14), Max: 36.7 (2024 09:14)  T(F): 98 (2024 09:14), Max: 98 (2024 09:14)  HR: 136 (2024 12:00) (114 - 144)  BP: 118/78 (2024 12:00) (118/78 - 132/81)  BP(mean): --  ABP: --  ABP(mean): --  RR: 18 (2024 12:00) (18 - 18)  SpO2: 98% (2024 12:00) (94% - 98%)    O2 Parameters below as of 2024 12:00  Patient On (Oxygen Delivery Method): room air                 Input and Output:  I&O's Detail      Lab Data                        13.8   7.27  )-----------( 223      ( 2024 10:05 )             44.6     04-25    141  |  105  |  25<H>  ----------------------------<  177<H>  4.0   |  30  |  1.60<H>    Ca    8.7      2024 10:05  Mg     2.1     -25    TPro  6.8  /  Alb  3.4  /  TBili  0.3  /  DBili  x   /  AST  40<H>  /  ALT  49  /  AlkPhos  109  04-25            Review of Systems	      Objective     Physical Examination        Pertinent Lab findings & Imaging      Gonzáles:  NO   Adequate UO     I&O's Detail           Discussed with:     Cultures:	        Radiology    ACC: [id number] EXAM:  MR FOOT WAW IC RT   ORDERED BY:  AMARILIS CHARLES     PROCEDURE DATE:  03/15/2024          INTERPRETATION:  CLINICAL INFORMATION: Pain and swelling. Chronic ulcer  TECHNIQUE: Multiplanar, multisequence MRI was obtained of the right   midfoot and forefoot. Images were obtained before and after the   administration of IV contrast.  Contrast: Gadavist. Administered: 10 cc. Discarded: 0 cc.  COMPARISON: Foot radiographs dated 3/11/2024    FINDINGS:    MUSCLES AND TENDONS: There is moderate edema and atrophy throughout the   intrinsic musculature the foot. The tendons appear intact.  PLANTAR FASCIA: Intact  LIGAMENTS: Intact Lisfranc ligament. The plantar plates are preserved.  CARTILAGE and SUBCHONDRAL BONE: Focal cartilage loss the central first   metatarsophalangeal joint at the periphery the first metatarsal head with   underlying subchondral edema. The remaining cartilage surfaces are   preserved.  SYNOVIUM/JOINT FLUID: There is no joint effusion.  MARROW: There is subtle marrow edema and mild enhancement noted within   the distal tuft of the great toe distal phalanx. The remaining bone   marrow signal is relatively preserved. No cortical erosion or destruction   is noted.  NEUROVASCULAR STRUCTURES: Preserved  PERIPHERAL/ SUB-CUTANEOUS SOFT TISSUES: Mild to moderate dorsal soft   tissue swelling. No drainable fluid collection. No discrete skin wound is   noted.    IMPRESSION:  1.  Subtle marrow edema and enhancement within the distal phalangeal tuft   of the great toe. A nonspecific finding. Correlate clinically for an   adjacent skin wound which would increase the probability of osteomyelitis   in this region.  2.  DJD at the first MTP  3.  Soft tissue swelling at the forefoot is nonspecific and may reflect   cellulitis in the appropriate clinical setting. No drainable fluid   collection.    --- End of Report ---            KAREN PINO MD; Attending Radiologist                           Date/Time Patient Seen:  		  Referring MD:   Data Reviewed	       Patient is a 83y old  Male who presents with a chief complaint of     Subjective/HPI   83-year-old  black male with history of COPD coronary artery disease hypertension diabetes hyperlipidemia atrial fibrillation on Eliquis as well as osteomyelitis who presents now to the emergency department at [facility] complaining of rapid heart rate  PAST MEDICAL & SURGICAL HISTORY:  Diabetes Mellitus, Type II    CAD (Coronary Artery Disease)  s/p 3v CABG ; stents placed [place] in     Dyslipidemia    Asymptomatic Peripheral Vascular Disease    Osteomyelitis    COPD (chronic obstructive pulmonary disease)  on 2L at home and BiPAP at night; intubated     Diabetes mellitus    Hypertension    PVD (peripheral vascular disease)    History of PAT (paroxysmal atrial tachycardia)    Asthma with COPD    BPH (benign prostatic hyperplasia)    Acute osteomyelitis    CABG (Coronary Artery Bypass Graft)      Compound fracture  left leg    S/P primary angioplasty with coronary stent    H/O drainage of abscess  Left femur 2021    Asthma with COPD     BPH (benign prostatic hyperplasia)     CAD (Coronary Artery Disease) s/p 3v CABG ; stents placed in [place] in     COPD (chronic obstructive pulmonary disease) on 2L at home and BiPAP at night; intubated     Diabetes Mellitus, Type II     Dyslipidemia     History of PAT (paroxysmal atrial tachycardia)     Hypertension     Osteomyelitis     PVD (peripheral vascular disease).     PAST SURGICAL HISTORY:  CABG (Coronary Artery Bypass Graft)     Compound fracture left leg    H/O drainage of abscess Left femur 2021    S/P primary angioplasty with coronary stent.     FAMILY HISTORY:  Family hx of lung cancer, brother,  age 82, used to smoke with pt    Sibling  Still living? Unknown  Family history of diabetes mellitus, Age at diagnosis: Age Unknown.     Social History:  · Substance use	No  · Social History (marital status, living situation, occupation, and sexual history)	 and living with spouse     Tobacco Screening:  · Core Measure Site	Yes  · Has the patient used tobacco in the past 30 days?	No    Risk Assessment:    Present on Admission:  Deep Venous Thrombosis	no  Pulmonary Embolus	no  Urinary Catheter	no  Central Venous Catheter/PICC Line	no  Surgical Site Incision	no  Pressure Ulcer(s)	no     HIV Screening:  · In accordance with [place] law, we offer every patient who comes to our ED an HIV test. Would you like to be tested today?	Unable to answer due to medical condition/unresponsive/etc...        Medication list         MEDICATIONS  (STANDING):  diltiazem Infusion 10 mG/Hr (10 mL/Hr) IV Continuous <Continuous>    MEDICATIONS  (PRN):         Vitals log        ICU Vital Signs Last 24 Hrs  T(C): 36.7 (2024 09:14), Max: 36.7 (2024 09:14)  T(F): 98 (2024 09:14), Max: 98 (2024 09:14)  HR: 136 (2024 12:00) (114 - 144)  BP: 118/78 (2024 12:00) (118/78 - 132/81)  BP(mean): --  ABP: --  ABP(mean): --  RR: 18 (2024 12:00) (18 - 18)  SpO2: 98% (2024 12:00) (94% - 98%)    O2 Parameters below as of 2024 12:00  Patient On (Oxygen Delivery Method): room air                 Input and Output:  I&O's Detail      Lab Data                        13.8   7.27  )-----------( 223      ( 2024 10:05 )             44.6     04-25    141  |  105  |  25<H>  ----------------------------<  177<H>  4.0   |  30  |  1.60<H>    Ca    8.7      2024 10:05  Mg     2.1     04-25    TPro  6.8  /  Alb  3.4  /  TBili  0.3  /  DBili  x   /  AST  40<H>  /  ALT  49  /  AlkPhos  109  04-25            Review of Systems	  tachy  anxious      Objective     Physical Examination    heart s1s2  lung dc bS  head nc  head at  verbal  alert  cn grossly int  moves extr      Pertinent Lab findings & Imaging      Gonzáles:  NO   Adequate UO     I&O's Detail           Discussed with:     Cultures:	        Radiology    ACC: [id number] EXAM:  MR FOOT WAW IC RT   ORDERED BY:  AMARILIS CHARLES     PROCEDURE DATE:  03/15/2024          INTERPRETATION:  CLINICAL INFORMATION: Pain and swelling. Chronic ulcer  TECHNIQUE: Multiplanar, multisequence MRI was obtained of the right   midfoot and forefoot. Images were obtained before and after the   administration of IV contrast.  Contrast: Gadavist. Administered: 10 cc. Discarded: 0 cc.  COMPARISON: Foot radiographs dated 3/11/2024    FINDINGS:    MUSCLES AND TENDONS: There is moderate edema and atrophy throughout the   intrinsic musculature the foot. The tendons appear intact.  PLANTAR FASCIA: Intact  LIGAMENTS: Intact Lisfranc ligament. The plantar plates are preserved.  CARTILAGE and SUBCHONDRAL BONE: Focal cartilage loss the central first   metatarsophalangeal joint at the periphery the first metatarsal head with   underlying subchondral edema. The remaining cartilage surfaces are   preserved.  SYNOVIUM/JOINT FLUID: There is no joint effusion.  MARROW: There is subtle marrow edema and mild enhancement noted within   the distal tuft of the great toe distal phalanx. The remaining bone   marrow signal is relatively preserved. No cortical erosion or destruction   is noted.  NEUROVASCULAR STRUCTURES: Preserved  PERIPHERAL/ SUB-CUTANEOUS SOFT TISSUES: Mild to moderate dorsal soft   tissue swelling. No drainable fluid collection. No discrete skin wound is   noted.    IMPRESSION:  1.  Subtle marrow edema and enhancement within the distal phalangeal tuft   of the great toe. A nonspecific finding. Correlate clinically for an   adjacent skin wound which would increase the probability of osteomyelitis   in this region.  2.  DJD at the first MTP  3.  Soft tissue swelling at the forefoot is nonspecific and may reflect   cellulitis in the appropriate clinical setting. No drainable fluid   collection.    --- End of Report ---            KAREN PINO MD; Attending Radiologist

## 2024-04-25 NOTE — H&P ADULT - PROBLEM SELECTOR PLAN 8
Chronic  - CXR wet read: no gross abnormalities  - Pulm Dr. Combs consulted Chronic, baseline 2L home O2 and BiPAP qHS  - CXR wet read: no gross abnormalities  - continue home  montelukast  - Pulm Dr. Combs consulted

## 2024-04-25 NOTE — CONSULT NOTE ADULT - SUBJECTIVE AND OBJECTIVE BOX
S : 83y year old Male seen at bedside for right hallux ulcer.  Pt states he has had this for a while and that it is almost closed    Chief Complaint : Patient is a 83y old  Male who presents with a chief complaint of AFib w/ RVR (25 Apr 2024 15:44)    HPI : HPI:  83yoM PMHx AFib on Eliquis, CAD, HTN, DM, HLD, COPD, osteomyelitis presents c/o shortness of breath, chest discomfort. Pt reports onset of rapid heart rate this morning, HR measured 160s when he checked his pulse ox. Also endorses dyspnea exacerbated by movement. Pt reports last episode of AFib in 2020, no episodes since then until today's presentation. Pt states that chest discomfort feels like chest pressure, no chest pain. Pt also reports chronic R big toe wound for the past few months, follows w/ Wound Care. States that he has increased sensitivity to R sole of foot, but denies pain, numbness/tingling. Denies fevers, chills, abdominal pain, N/V/D/C.     IN THE ED:  Temp 98  F ,  , /81  ,RR 18 , SpO2 94%  S/P PO , IV diltiazem 10mg x2, IV diltiazem gtt @ 10 mg/hr  Labs significant for BUN/Cr 25/1.6, troponin 507.9, pBNP 355  Imaging:   CXR wet read: no gross abnormalities (25 Apr 2024 12:13)      Patient admits to  (-) Fevers, (-) Chills, (-) Nausea, (-) Vomiting, (-) Shortness of Breath      PMH: Diabetes Mellitus, Type II    CAD (Coronary Artery Disease)    Dyslipidemia    Asymptomatic Peripheral Vascular Disease    Osteomyelitis    COPD (chronic obstructive pulmonary disease)    Diabetes mellitus    Hypertension    PVD (peripheral vascular disease)    History of PAT (paroxysmal atrial tachycardia)    Asthma with COPD    BPH (benign prostatic hyperplasia)    Acute osteomyelitis      PSH:CABG (Coronary Artery Bypass Graft)    Compound fracture    S/P primary angioplasty with coronary stent    H/O drainage of abscess        Allergies:No Known Allergies      Labs:                          13.8   7.27  )-----------( 223      ( 25 Apr 2024 10:05 )             44.6     WBC Trend  7.27 Date (04-25 @ 10:05)      Chem  04-25    141  |  105  |  25<H>  ----------------------------<  177<H>  4.0   |  30  |  1.60<H>    Ca    8.7      25 Apr 2024 10:05  Mg     2.1     04-25    TPro  6.8  /  Alb  3.4  /  TBili  0.3  /  DBili  x   /  AST  40<H>  /  ALT  49  /  AlkPhos  109  04-25          T(F): 97.7 (04-25-24 @ 14:56), Max: 98.6 (04-25-24 @ 13:36)  HR: 81 (04-25-24 @ 17:40) (81 - 144)  BP: 127/72 (04-25-24 @ 17:40) (107/68 - 132/81)  RR: 18 (04-25-24 @ 14:56) (18 - 18)  SpO2: 94% (04-25-24 @ 14:56) (94% - 98%)  Wt(kg): --    O:   General: Pleasant  male NAD & AOX3.    Integument:  Skin warm, dry and supple bilateral.    Noted right hallux ulcer dorsal distal, 0.1x0.1x0.1 cm, nearly healed over, granular base, no clinical signs of infection  Vascular: Dorsalis Pedis and Posterior Tibial pulses 1/4.  Capillary re-fill time less then 3 seconds digits 1-5 bilateral.    Neuro: Protective sensation intact to the level of the digits bilateral.  MSK: Muscle strength 5/5 all major muscle groups bilateral.      P:

## 2024-04-25 NOTE — H&P ADULT - NSVTERISKREFERASSESS_GEN_ALL_CORE
PRIMARY CARE PHYSICIAN: Stanton Christianson DO  REFERRING PROVIDER: No referring provider defined for this encounter.     CONSULT ORTHOPAEDIC: Shoulder Evaluation        ASSESSMENT & PLAN    Impression: 55 y.o. male follow up acute right shoulder injury.    Plan:   I explained to the patient the nature of their diagnosis.  I reviewed their imaging studies with them.    He had an acute injury to his right shoulder while at work.  He notes that he is now ready to return to work and is requesting a return to work release which he was provided today.    At the end of the visit, all questions were answered in full. The patient is in agreement with the plan and recommendations. They will call the office with any questions/concerns.      Note dictated with Markado software. Completed without full typed error editing and sent to avoid delay.       SUBJECTIVE  CHIEF COMPLAINT: Right shoulder injury    HPI: Derik Reyes is a 55 y.o. patient who returns for follow-up evaluation of his right shoulder pain and dysfunction.  He notes significant improvements and he has been working hard with physical therapy.  He is planning to return to work.    Please see below for full history from prior visit:    Derik Reyes complains of right shoulder pain and dysfunction.  He notes that he was lifting a heavy object while at work and felt a pain deep in his right shoulder.  He localizes pain deep in the shoulder joint.  He has occasional mechanical symptoms.  No issues with the shoulder prior to this injury.  He has difficulty with arm extended and overhead activities.  No associate neck pain.  No numbness tingling or paresthesias. He reports no new injury since his last visit. He has since started physical therapy.     Here to go over MRI results    REVIEW OF SYSTEMS  Constitutional: See HPI for pain assessment, No significant weight loss, recent trauma  Cardiovascular: No chest pain, shortness of  "breath  Respiratory: No difficulty breathing, cough  Gastrointestinal: No nausea, vomiting, diarrhea, constipation  Musculoskeletal: Noted in HPI, positive for pain, restricted motion, stiffness  Integumentary: No rashes, easy bruising, redness   Neurological: no numbness or tingling in extremities, no gait disturbances   Psychiatric: No mood changes, memory changes, social issues  Heme/Lymph: no excessive swelling, easy bruising, excessive bleeding  ENT: No hearing changes  Eyes: No vision changes    No past medical history on file.     No Known Allergies     No past surgical history on file.     No family history on file.     Social History     Socioeconomic History    Marital status: Unknown     Spouse name: Not on file    Number of children: Not on file    Years of education: Not on file    Highest education level: Not on file   Occupational History    Not on file   Tobacco Use    Smoking status: Unknown    Smokeless tobacco: Not on file   Substance and Sexual Activity    Alcohol use: Not on file    Drug use: Not on file    Sexual activity: Not on file   Other Topics Concern    Not on file   Social History Narrative    Not on file     Social Determinants of Health     Financial Resource Strain: Not on file   Food Insecurity: Not on file   Transportation Needs: Not on file   Physical Activity: Not on file   Stress: Not on file   Social Connections: Not on file   Intimate Partner Violence: Not on file   Housing Stability: Not on file        CURRENT MEDICATIONS:   Current Outpatient Medications   Medication Sig Dispense Refill    amLODIPine (Norvasc) 5 mg tablet Take 1 tablet (5 mg) by mouth once daily.       No current facility-administered medications for this visit.        OBJECTIVE    PHYSICAL EXAM      11/1/2023     9:57 AM 12/4/2023     8:46 AM   Vitals   Height (in) 1.676 m (5' 6\") 1.676 m (5' 6\")   Weight (lb) 146 150   BMI 23.57 kg/m2 24.21 kg/m2   BSA (m2) 1.76 m2 1.78 m2   Visit Report Report Report    "   There is no height or weight on file to calculate BMI.    GENERAL: A/Ox3, NAD. Appears healthy, well nourished  PSYCHIATRIC: Mood stable, appropriate memory recall  EYES: EOM intact, no scleral icterus  CARDIOVASCULAR: Palpable peripheral pulses  LUNGS: Breathing non-labored on room air  SKIN: no erythema, rashes, or ecchymosis     MUSCULOSKELETAL:  Laterality: right Shoulder Exam  - Negative Spurlings, full painless neck ROM  - Skin intact  - No erythema or warmth  - No ecchymosis or soft tissue swelling  - Shoulder ROM: Forward flexion 160/165, ER 40/45, IR upper lumbar  - Strength:      Jobes 5-/5     ER 5-/5     Belly press and bearhug 5-/5  - Palpation: Mild tenderness biceps groove  - Tamez and Neers mildly positive  - Speeds and O'Briens mildly positive    NEUROVASCULAR:  - Neurovascular Status: sensation intact to light touch distally, upper extremity motor grossly intact  - Capillary refill brisk at extremities, Bilateral peripheral pulses 2+    Imaging: Multiple views of the affected right shoulder(s) demonstrate: No significant osseous normality, no fracture, no significant degenerative changes.   X-rays were personally reviewed and interpreted by me.  Radiology reports were reviewed by me as well, if readily available at the time.      Roosevelt Santos MD  Attending Surgeon    Sports Medicine Orthopaedic Surgery  [facility] [place]  [employer] [employer] [facility] [employer]   Refer to the Assessment tab to view/cancel completed assessment.

## 2024-04-25 NOTE — ED ADULT TRIAGE NOTE - CHIEF COMPLAINT QUOTE
Pt BIBA from home for chest pain starting about 1 hour ago but relieved PTA on its own. Pt reports intermittent dizziness over last couple days. Hx 1 cardiac stent, on Eliquis.

## 2024-04-25 NOTE — ED PROVIDER NOTE - PHYSICAL EXAMINATION
Examination reveals a well-developed well-nourished pleasant black male in no acute distress.  HEENT normocephalic atraumatic pupils equal round react light accommodation extract months intact neck is supple lungs are markedly diminished breath sounds diffusely heart S1-S2 with any murmurs rubs gallops abdomen soft nontender positive bowel sounds extremities do not reveal any pretibial pitting edema neurologically alert orient x 4 without any focal logic deficits.

## 2024-04-25 NOTE — PATIENT PROFILE ADULT - NSFALLSECTIONLABEL_GEN_A_CORE
RICH ambulatory encounter  FAMILY PRACTICE OFFICE VISIT    CHIEF COMPLAINT:    Chief Complaint   Patient presents with   • Telephonic Visit     migraine issues       SUBJECTIVE:  Sinai Serrano is a 43 year old female who presented requesting evaluation for follow up for migraines,  She did not tolerate either of 2 monthly migraine prevention injections.  She has been following with neurology regarding these.  She does not recall any oral migraine prevention meds in the past.  She currently takes Maxalt for migraine , but is taking longer to help.    She currently cannot tolerate bid gabapentin. She only takes once daily.  She is currently taking citalopram, gabapentin, and medication list was reviewed.  depakote used in past, d/c reason:  Not needed.  Poorly tolerated flexeril, other meds in past.  She may have been treated with Topamax in distant past, not sure if helped or if tolerated in the past.    She currently has approx 4 migraine per week, typically lasting 3-4 hours each, with some mild resurgence, may linger till next day.  She no longer has menses, currently has Mirena IUD, last 8 years.  She originally had Mirena for managing ovarian cysts.    She had cholecystectomy.    Review of systems:   Constitutional: Negative for fever and chills.   Skin: Negative for rash.   HEENT: Negative for eye drainage, ear pain, sore throat.  Respiratory: Negative for cough or shortness of breath.    Cardiovascular: Negative for chest pain or chest pressure.   Gastrointestinal: Negative for nausea, vomiting, diarrhea or abdominal pain.   Genitourinary: Negative for dysuria, frequency or hematuria.  Extremities: Negative for joint swelling or joint pain.  Endocrine: Negative for heat or cold intolerance.  Psychiatric: anxiety, depression, treated.     OBJECTIVE:  PROBLEM LIST:   Patient Active Problem List   Diagnosis   • Obesity   • Right lateral epicondylitis   • Patellofemoral arthralgia of both knees   •  Lumbago   • Rectal discharge   • Internal hemorrhoids with other complication   • Migraine with aura and without status migrainosus, not intractable   • Cutaneous skin tags   • Keratosis, seborrheic   • Episodic tension-type headache, not intractable   • Fibromyalgia syndrome   • Irritable bowel syndrome with diarrhea   • Benign paroxysmal positional vertigo   • Malaise and fatigue   • Insomnia due to medical condition   • Allergic rhinitis   • Radial styloid tenosynovitis   • Right radial neuritis   • External hemorrhoids with complication   • Adjustment disorder with anxiety   • Rectal prolapse       PAST HISTORIES:   I have reviewed the past medical history, family history, social history, medications and allergies listed in the medical record as obtained by my nursing staff and support staff and agree with their documentation.  Current Outpatient Medications   Medication Sig Dispense Refill   • pantoprazole (PROTONIX) 40 MG tablet TAKE ONE TABLET BY MOUTH DAILY 30 tablet 1   • rizatriptan (MAXALT) 10 MG tablet TAKE ONE TABLET BY MOUTH AT ONSET OF HEADACHE; MAY REPEAT ONE TABLET IN 2 HOURS IF NEEDED. 18 tablet 1   • buPROPion (WELLBUTRIN SR) 150 MG 12 hr tablet Take 1 tablet by mouth 2 times daily. Take at 8 am, 3 pm 60 tablet 1   • ondansetron (ZOFRAN ODT) 4 MG disintegrating tablet Place 1 tablet onto the tongue every 8 hours as needed for Nausea. 12 tablet 1   • gabapentin (NEURONTIN) 300 MG capsule Take 1 capsule by mouth 2 times daily. 60 capsule 5   • TEMazepam (RESTORIL) 15 MG capsule Take 1 capsule by mouth nightly as needed for Sleep (take every 2-3rd night if possible.). 15 capsule 1   • losartan (COZAAR) 50 MG tablet Take 1 tablet by mouth daily. See note. 90 tablet 3   • levonorgestrel (Mirena, 52 MG,) (52 MG) 20 MCG/DAY intrauterine device      • citalopram (CELEXA) 40 MG tablet TAKE ONE TABLET BY MOUTH DAILY 90 tablet 3   • meclizine (ANTIVERT) 25 MG tablet Take 1 tablet by mouth 3 times daily as  needed for Dizziness (and nausea). 30 tablet 1   • traMADol (ULTRAM) 50 MG tablet Take 1-2 tablets by mouth every 8 hours as needed for Pain. 180 tablet 5   • acetaminophen (TYLENOL) 325 MG tablet Take 650 mg by mouth every 4 hours as needed for Pain. Dose: 2 tablets (=650 mg)     • loperamide (IMODIUM A-D) 2 MG capsule Take 2 mg by mouth 4 times daily as needed for Diarrhea.     • divalproex (DEPAKOTE) 250 MG delayed release EC tablet Take 1 tablet by mouth 2 times daily. 60 tablet 11     No current facility-administered medications for this visit.     Past Medical History:   Diagnosis Date   • Acne    • Anxiety    • Arthritis     knees, foot   • Chronic kidney disease     kidney stones   • Chronic pain    • Depression    • Fibromyalgia    • Gastroesophageal reflux disease    • Hemorrhoid    • IBS (irritable bowel syndrome)     diarrhea   • Incomplete emptying of bladder    • IUD (intrauterine device) in place    • Knee pain, bilateral    • Migraine    • Obesity    • Otitis media    • Rectal prolapse        PHYSICAL EXAM:   Tele visit    LAB RESULTS:   Lab Results   Component Value Date    SODIUM 137 01/24/2020    POTASSIUM 4.0 01/24/2020    CHLORIDE 105 01/24/2020    CO2 28 01/24/2020    BUN 11 01/24/2020    CREATININE 0.63 04/28/2020    GLUCOSE 89 01/24/2020     Lab Results   Component Value Date    WBC 7.3 01/24/2020    HCT 39.5 01/24/2020    HGB 13.0 01/24/2020     01/24/2020     Lab Results   Component Value Date    GLUCOSE 89 01/24/2020    SODIUM 137 01/24/2020    POTASSIUM 4.0 01/24/2020    CHLORIDE 105 01/24/2020    BUN 11 01/24/2020    CREATININE 0.63 04/28/2020    CALCIUM 9.1 01/24/2020    ALBUMIN 3.7 01/24/2020    AST 11 01/24/2020    ALKPT 97 01/24/2020    GPT 26 01/24/2020    ANIONGAP 8 (L) 01/24/2020    BCRAT 15 01/24/2020    GLOB 3.5 01/24/2020    AGR 1.1 01/24/2020     TSH (mcUnits/mL)   Date Value   01/24/2020 3.017     Lab Results   Component Value Date    CHOLESTEROL 134 02/01/2016    HDL  49 02/01/2016    CALCLDL 67 02/01/2016    TRIGLYCERIDE 89 02/01/2016       ASSESSMENT:   1. Migraine with aura and without status migrainosus, not intractable    2. Fibromyalgia syndrome    3. Malaise and fatigue    4. Class 3 severe obesity due to excess calories with serious comorbidity and body mass index (BMI) of 40.0 to 44.9 in adult (CMS/[facility])        PLAN:   Orders Placed This Encounter   • divalproex (DEPAKOTE) 250 MG delayed release EC tablet     After thorough review of meds, consideration given to modifying bp med to CCB, or restarting depakote bid at low dose.  Last option chosen, since would probably tolerate better.  Consideration to Topamax also, if needed, may need further f/u with neurology.  Start depakote 250 bid, keep diary of headaches, f/u in 2-3 months.  30 minutes spent with patient    No follow-ups on file.    Instructions provided as documented in the after visit summary.    The patient indicated understanding of the diagnosis and agreed with the plan of care.      .

## 2024-04-25 NOTE — H&P ADULT - PROBLEM SELECTOR PROBLEM 1
[Medication Risks Reviewed] : Medication risks reviewed [Surgical risks reviewed] : Surgical risks reviewed [de-identified] : Patient is a 5-year-old female with a right lateral plateau subchondral insufficiency fracture as well as osteoarthritis presenting today for follow-up.  I have reassured her that I see no evidence for surgical intervention at this time however she does require further conservative treatment in order to help improve her pain.  I have given her prescription for crutches in order to allow her to be nonweightbearing in her right lower extremity.  I recommended viscosupplementation I have ordered that for her.  She is continue take Tylenol and NSAIDs as needed for the pain.  I like her to start and continue with low impact activity and exercises and range of motion with physical therapy.  I will see her back after viscosupplementation for repeat evaluation and management.  All questions were asked and answered Atrial fibrillation with RVR

## 2024-04-25 NOTE — H&P ADULT - PROBLEM SELECTOR PLAN 3
- R big toe dressing c/d/i  - on home ciprofloxacin for osteomyelitis  - continue IV   - f/u MRSA/MSSA PCR  - Wound Care consulted - R big toe dressing c/d/i  - on home ciprofloxacin for osteomyelitis  - start IV vanco  - f/u MRSA/MSSA PCR  - Wound Care consulted  - ID consult

## 2024-04-26 NOTE — CARE COORDINATION ASSESSMENT. - NSDCPLANSERVICES_GEN_ALL_CORE
This CM met with the pt and his wife at the bedside. Introduced myself as the CM, explained my role and left contact information and Discharge packet. Pt and wife verbalized understanding. The pt gave this CM permission to speak to his wife for the assessment and DC planning. The pt and wife live in a private home with 8 steps to enter and 13 steps to the bedroom inside. The pt has and uses a cane and a rollator to ambulate independently. PCP is Dr. Sarah Avila in [place] and the [facility] is [facility] [place]. CM spoke to the pt and wife about home care services if the pt has a skilled need, and that the PTE will determine the recommended safe discharge plan. Insurance provisions were discussed and verbalized understanding. Pt and wife chose [facility] home care. Pt states her son can help transport the pt home after discharge. Pt has been to [facility] years ago as per the wife. CM team to follow for post acute needs./Anticipated Needs Unclear at Present This CM met with the pt and his wife at the bedside. Introduced myself as the CM, explained my role and left contact information and Discharge packet. Pt and wife verbalized understanding. The pt gave this CM permission to speak to his wife for the assessment and DC planning. The pt and wife live in a private home with 8 steps to enter and 13 steps to the bedroom inside. The pt has and uses a cane and a rollator to ambulate independently. PCP is Dr. Sarah Avila in [place] and the pharmacy is [facility] in [place]. CM spoke to the pt and wife about home care services if the pt has a skilled need, and that the PTE will determine the recommended safe discharge plan. Insurance provisions were discussed and verbalized understanding. Pt sees Dr. Berumen at the wound care center for a chronic right toe wound with osteo and on po cipro at home. Pt and wife chose [facility] home care. Pt states her son can help transport the pt home after discharge. Pt has been to [facility] years ago as per the wife. CM team to follow for post acute needs./Anticipated Needs Unclear at Present

## 2024-04-26 NOTE — PROGRESS NOTE ADULT - SUBJECTIVE AND OBJECTIVE BOX
Patient is a 83y old  Male who presents with a chief complaint of AFib w/ RVR (26 Apr 2024 08:27)      INTERVAL HPI/OVERNIGHT EVENTS: Patient was seen and examined at bedside. No overnight events occurred. Patient had no events on tele overnight and maintained sinus rhythm with HR mostly in the 70's. This AM patient was sinus HR in the 80's. Patient did not use Bipap overnight because the settings were to high. He does not know his home settings. Patient has no complaints this AM, and denies any chest pain, palpitations or SOB.       MEDICATIONS  (STANDING):  apixaban 2.5 milliGRAM(s) Oral every 12 hours  aspirin enteric coated 81 milliGRAM(s) Oral daily  atorvastatin 80 milliGRAM(s) Oral at bedtime  ciprofloxacin     Tablet 250 milliGRAM(s) Oral every 12 hours  dextrose 10% Bolus 125 milliLiter(s) IV Bolus once  dextrose 5%. 1000 milliLiter(s) (100 mL/Hr) IV Continuous <Continuous>  dextrose 5%. 1000 milliLiter(s) (50 mL/Hr) IV Continuous <Continuous>  dextrose 50% Injectable 25 Gram(s) IV Push once  dextrose 50% Injectable 12.5 Gram(s) IV Push once  glucagon  Injectable 1 milliGRAM(s) IntraMuscular once  insulin glargine Injectable (LANTUS) 6 Unit(s) SubCutaneous at bedtime  insulin lispro (ADMELOG) corrective regimen sliding scale   SubCutaneous three times a day before meals  insulin lispro (ADMELOG) corrective regimen sliding scale   SubCutaneous at bedtime  metoprolol tartrate 50 milliGRAM(s) Oral every 8 hours  montelukast 10 milliGRAM(s) Oral daily  sodium chloride 0.9%. 1000 milliLiter(s) (75 mL/Hr) IV Continuous <Continuous>  tamsulosin 0.4 milliGRAM(s) Oral at bedtime    MEDICATIONS  (PRN):  acetaminophen     Tablet .. 650 milliGRAM(s) Oral every 6 hours PRN Temp greater or equal to 38C (100.4F), Mild Pain (1 - 3)  albuterol    90 MICROgram(s) HFA Inhaler 2 Puff(s) Inhalation every 6 hours PRN Shortness of Breath and/or Wheezing  dextrose Oral Gel 15 Gram(s) Oral once PRN Blood Glucose LESS THAN 70 milliGRAM(s)/deciliter      Allergies    No Known Allergies    Intolerances        REVIEW OF SYSTEMS:  CONSTITUTIONAL: No fever or chills  HEENT:  No headache, no sore throat  RESPIRATORY: No cough, wheezing, or shortness of breath  CARDIOVASCULAR: No chest pain, palpitations  GASTROINTESTINAL: No abd pain, nausea, vomiting, or diarrhea  GENITOURINARY: No dysuria, frequency, or hematuria  NEUROLOGICAL: no focal weakness or dizziness  MUSCULOSKELETAL: no myalgias     Vital Signs Last 24 Hrs  T(C): 36.7 (26 Apr 2024 04:21), Max: 37 (25 Apr 2024 13:36)  T(F): 98.1 (26 Apr 2024 04:21), Max: 98.6 (25 Apr 2024 13:36)  HR: 85 (26 Apr 2024 04:21) (74 - 144)  BP: 120/71 (26 Apr 2024 04:21) (107/68 - 144/74)  BP(mean): --  RR: 18 (26 Apr 2024 04:21) (18 - 19)  SpO2: 95% (26 Apr 2024 04:21) (94% - 98%)    Parameters below as of 26 Apr 2024 04:21  Patient On (Oxygen Delivery Method): room air        PHYSICAL EXAM:  GENERAL: NAD  HEENT:  anicteric, moist mucous membranes  CHEST/LUNG:  CTA b/l, no rales, wheezes, or rhonchi  HEART:  RRR, S1, S2  ABDOMEN:  BS+, soft, nontender, nondistended  EXTREMITIES: no edema, cyanosis, or calf tenderness  NERVOUS SYSTEM: answers questions and follows commands appropriately    LABS:                        13.0   6.60  )-----------( 190      ( 26 Apr 2024 08:05 )             40.9     CBC Full  -  ( 26 Apr 2024 08:05 )  WBC Count : 6.60 K/uL  Hemoglobin : 13.0 g/dL  Hematocrit : 40.9 %  Platelet Count - Automated : 190 K/uL  Mean Cell Volume : 85.7 fl  Mean Cell Hemoglobin : 27.3 pg  Mean Cell Hemoglobin Concentration : 31.8 gm/dL  Auto Neutrophil # : 4.42 K/uL  Auto Lymphocyte # : 1.15 K/uL  Auto Monocyte # : 0.91 K/uL  Auto Eosinophil # : 0.06 K/uL  Auto Basophil # : 0.03 K/uL  Auto Neutrophil % : 66.9 %  Auto Lymphocyte % : 17.4 %  Auto Monocyte % : 13.8 %  Auto Eosinophil % : 0.9 %  Auto Basophil % : 0.5 %    25 Apr 2024 10:05    141    |  105    |  25     ----------------------------<  177    4.0     |  30     |  1.60     Ca    8.7        25 Apr 2024 10:05  Mg     2.1       25 Apr 2024 10:05    TPro  6.8    /  Alb  3.4    /  TBili  0.3    /  DBili  x      /  AST  40     /  ALT  49     /  AlkPhos  109    25 Apr 2024 10:05    PT/INR - ( 25 Apr 2024 10:05 )   PT: 11.8 sec;   INR: 1.01 ratio         PTT - ( 25 Apr 2024 10:05 )  PTT:29.7 sec  Urinalysis Basic - ( 25 Apr 2024 10:05 )    Color: x / Appearance: x / SG: x / pH: x  Gluc: 177 mg/dL / Ketone: x  / Bili: x / Urobili: x   Blood: x / Protein: x / Nitrite: x   Leuk Esterase: x / RBC: x / WBC x   Sq Epi: x / Non Sq Epi: x / Bacteria: x      CAPILLARY BLOOD GLUCOSE      POCT Blood Glucose.: 135 mg/dL (26 Apr 2024 08:07)  POCT Blood Glucose.: 188 mg/dL (25 Apr 2024 21:16)  POCT Blood Glucose.: 272 mg/dL (25 Apr 2024 16:54)  POCT Blood Glucose.: 196 mg/dL (25 Apr 2024 11:20)          RADIOLOGY & ADDITIONAL TESTS:    Personally reviewed.     Consultant(s) Notes Reviewed:  [x] YES  [ ] NO

## 2024-04-26 NOTE — CARE COORDINATION ASSESSMENT. - NSCAREPROVIDERS_GEN_ALL_CORE_FT
CARE PROVIDERS:  Accepting Physician: Marc Grossman  Administration: Naren Zavala  Admitting: Marc Grossman  Attending: Marc Grossman  Case Management: Aviva Harman  Consultant: Elizabeth Negron  Consultant: Shamar Haney  Consultant: Trent Combs  Consultant: Jena Bunch  Consultant: Aziza Portillo  Consultant: Viola Cortez  Consultant: Regla Esquivel  Consultant: Cookie Ordaz  Consultant: Alhaji Cruz  Covering Team: Regla Bullock  ED Attending: Rolando Macario  ED Nurse: Genie Melendez  ED Nurse 2: Pasha Locke  Nurse: Belne Avila  Nurse: Arianne Hernandez  Ordered: ADM, User  Ordered: Doctor, Unknown  Outpatient Provider: Pallavi Rod  Outpatient Provider: Edenilson Beck  Outpatient Provider: Oscar Marcelino  Override: Vickie Alexander  Override: Viola Greene  Override: Akiko Soliz  PCA/Nursing Assistant: Austin Dang  Primary Team: Justin Salgado  Primary Team: Stephany Price  Primary Team: Marc Grossman  Primary Team: Ancelmo Sky  Registered Dietitian: Mi Ayala  Respiratory Therapy: Ron Young  : Alexandra Martin  Team: [facility]  Hospitalists, Team   CARE PROVIDERS:  Accepting Physician: Marc Grossman  Administration: Naren Zavala  Admitting: Marc Grossman  Attending: Marc Grossman  Case Management: Aviva Harman  Consultant: Elizabeth Negron  Consultant: Shamar Haney  Consultant: Trent Combs  Consultant: Jena Bunch  Consultant: Aziza Portillo  Consultant: Viola Cortez  Consultant: Regla Esquivel  Consultant: Cookie Ordaz  Consultant: Alhaji Cruz  Covering Team: Regla Bullock  ED Attending: Rolando Macario  ED Nurse: Genie Melendez  ED Nurse 2: Pasha Locke  Nurse: Belen Avila  Nurse: Arianne Hernandez  Ordered: ADM, User  Ordered: Doctor, Unknown  Outpatient Provider: Pallavi Rod  Outpatient Provider: Edenilson Beck  Outpatient Provider: Oscar Marcelino  Override: Vickie Alexander  Override: Viola Greene  Override: Akiko Soliz  PCA/Nursing Assistant: Austin Dang  Primary Team: Justin Salgado  Primary Team: Stephany Price  Primary Team: Marc Grossman  Primary Team: Ancelmo Sky  Registered Dietitian: Mi Ayala  : Alexandra Martin  Team: [facility]  Hospitalists, Team

## 2024-04-26 NOTE — DISCHARGE NOTE PROVIDER - NSDCFUSCHEDAPPT_GEN_ALL_CORE_FT
Juancho Berumen  [facility]  WOUNDCARE  [place]  Scheduled Appointment: 05/06/2024    [facility]  VASCULAR [address]  Scheduled Appointment: 07/01/2024    [facility]  VASCULAR [address]  Scheduled Appointment: 07/01/2024    Morro Buckley  [facility]  VASCULAR [address]  Scheduled Appointment: 07/01/2024     [facility]  VASCULAR [address]  Scheduled Appointment: 07/01/2024    [facility]  VASCULAR [address]  Scheduled Appointment: 07/01/2024    Morro Buckley  [facility]  VASCULAR [address]  Scheduled Appointment: 07/01/2024

## 2024-04-26 NOTE — DISCHARGE NOTE PROVIDER - HOSPITAL COURSE
HPI:  83yoM PMHx AFib on Eliquis, CAD, HTN, DM, HLD, COPD, osteomyelitis presents c/o shortness of breath, chest discomfort. Pt reports onset of rapid heart rate this morning, HR measured 160s when he checked his pulse ox. Also endorses dyspnea exacerbated by movement. Pt reports last episode of AFib in 2020, no episodes since then until today's presentation. Pt states that chest discomfort feels like chest pressure, no chest pain. Pt also reports chronic R big toe wound for the past few months, follows w/ [facility]. States that he has increased sensitivity to R sole of foot, but denies pain, numbness/tingling. Denies fevers, chills, abdominal pain, N/V/D/C.     IN THE ED:  Temp 98  F ,  , /81  ,RR 18 , SpO2 94%  S/P PO , IV diltiazem 10mg x2, IV diltiazem gtt @ 10 mg/hr  Labs significant for BUN/Cr 25/1.6, troponin 507.9, pBNP 355  Imaging:   CXR wet read: no gross abnormalities (25 Apr 2024 12:13)      ---  HOSPITAL COURSE: Patient admitted to medicine floor for management of Afib w/ RVR. Patient was given IV Cardizem x2 in ED and started on Cardizem gtt in ED. Patient converted to NSR and was transitioned to Meotprolol 50mg q8hrs. Patient was monitored on tele. TTE showed ________. Patient also had elevated trops likely 2/2 rapid Afib. Trops downtrended and patient did not complain of chest pain. Patient had a mild MERRILL that improved with IVF. Home furosemide was held.     Pt seen and examined on day of discharge. Patient is medically optimized for discharge to home with close outpatient followup.    PHYSICAL EXAM ON DAY OF DISCHARGE:  T(C): 36.8 (04-26-24 @ 11:50), Max: 37 (04-25-24 @ 13:36)  HR: 85 (04-26-24 @ 11:50) (74 - 99)  BP: 125/81 (04-26-24 @ 11:50) (107/68 - 144/74)  RR: 18 (04-26-24 @ 11:50) (18 - 19)  SpO2: 92% (04-26-24 @ 11:50) (92% - 98%)    GENERAL: patient appears well, no acute distress, appropriate, pleasant  EYES: sclera clear, no exudates  ENMT: oropharynx clear without erythema, no exudates, moist mucous membranes  NECK: supple, soft, no thyromegaly noted  LUNGS: good air entry bilaterally, clear to auscultation, symmetric breath sounds, no wheezing or rhonchi appreciated  HEART: soft S1/S2, regular rate and rhythm, no murmurs noted, no lower extremity edema  GASTROINTESTINAL: abdomen is soft, nontender, nondistended, normoactive bowel sounds, no palpable masses  INTEGUMENT: good skin turgor, warm skin, appears well perfused  MUSCULOSKELETAL: no clubbing or cyanosis, no obvious deformity  NEUROLOGIC: awake, alert, oriented x3, good muscle tone in 4 extremities, no obvious sensory deficits  PSYCHIATRIC: mood is good, affect is congruent, linear and logical thought process  HEME/LYMPH: no palpable supraclavicular nodules, no obvious ecchymosis or petechiae         ---  CONSULTANTS:     ---  TIME SPENT:  I, the attending physician, was physically present for the key portions of the evaluation and management (E/M) service provided. The total amount of time spent reviewing the hospital notes, laboratory values, imaging findings, assessing/counseling the patient, discussing with consultant physicians, social work, nursing staff was -- minutes    ---  Primary care provider was made aware of plan for discharge:      [  ] NO     [  ] YES   HPI:  83yoM PMHx AFib on Eliquis, CAD, HTN, DM, HLD, COPD, osteomyelitis presents c/o shortness of breath, chest discomfort. Pt reports onset of rapid heart rate this morning, HR measured 160s when he checked his pulse ox. Also endorses dyspnea exacerbated by movement. Pt reports last episode of AFib in 2020, no episodes since then until today's presentation. Pt states that chest discomfort feels like chest pressure, no chest pain. Pt also reports chronic R big toe wound for the past few months, follows w/ [facility]. States that he has increased sensitivity to R sole of foot, but denies pain, numbness/tingling. Denies fevers, chills, abdominal pain, N/V/D/C.     IN THE ED:  Temp 98  F ,  , /81  ,RR 18 , SpO2 94%  S/P PO , IV diltiazem 10mg x2, IV diltiazem gtt @ 10 mg/hr  Labs significant for BUN/Cr 25/1.6, troponin 507.9, pBNP 355  Imaging:   CXR wet read: no gross abnormalities (25 Apr 2024 12:13)      ---  HOSPITAL COURSE: Patient admitted to medicine floor for management of Afib w/ RVR. Patient was given IV Cardizem x2 in ED and started on Cardizem gtt in ED. Patient converted to NSR and was transitioned to Meotprolol 50mg q8hrs. Patient was monitored on tele. TTE showed: technically difficult study, not well visualized however the LV function is probably normal based on limited views. The right ventricle is not well visualized. probably normal systolic function, moderate thickening of the aortic valve leaflets, structurally normal mitral valve with normal leaflet excursion. Trace tricuspid regurgitation. The inferior vena cava is dilated measuring 2.18 cm in diameter, (dilated >2.1cm) with normal inspiratory collapse (normal >50%) consistent with mildly elevated right atrial pressure (~8, range 5-10mmHg). Estimated pulmonary artery systolic pressure is 26 mmHg, consistent with normal pulmonary artery pressure. Patient also had elevated trops likely 2/2 rapid Afib. Trops downtrended and patient did not complain of chest pain. Patient had a mild MERRILL that improved with IVF. Home furosemide was held.     Pt seen and examined on day of discharge. Patient is medically optimized for discharge to home with close outpatient followup.    PHYSICAL EXAM ON DAY OF DISCHARGE:  Vital Signs Last 24 Hrs  T(C): 36.7 (27 Apr 2024 06:37), Max: 36.8 (26 Apr 2024 11:50)  T(F): 98.1 (27 Apr 2024 06:37), Max: 98.3 (26 Apr 2024 11:50)  HR: 87 (27 Apr 2024 06:37) (75 - 92)  BP: 128/87 (27 Apr 2024 06:37) (124/75 - 158/69)  BP(mean): --  RR: 18 (27 Apr 2024 06:37) (18 - 18)  SpO2: 95% (27 Apr 2024 06:37) (92% - 97%)    Parameters below as of 27 Apr 2024 06:37  Patient On (Oxygen Delivery Method): room air        GENERAL: patient appears well, no acute distress, appropriate, pleasant  EYES: sclera clear, no exudates  ENMT: oropharynx clear without erythema, no exudates, moist mucous membranes  NECK: supple, soft, no thyromegaly noted  LUNGS: good air entry bilaterally, clear to auscultation, symmetric breath sounds, no wheezing or rhonchi appreciated  HEART: soft S1/S2, regular rate and rhythm, no murmurs noted, no lower extremity edema  GASTROINTESTINAL: abdomen is soft, nontender, nondistended, normoactive bowel sounds, no palpable masses  INTEGUMENT: good skin turgor, warm skin, appears well perfused  MUSCULOSKELETAL: no clubbing or cyanosis, no obvious deformity  NEUROLOGIC: awake, alert, oriented x3, good muscle tone in 4 extremities, no obvious sensory deficits  PSYCHIATRIC: mood is good, affect is congruent, linear and logical thought process  HEME/LYMPH: no palpable supraclavicular nodules, no obvious ecchymosis or petechiae         ---  CONSULTANTS:   Cardio - Dr. Haney    ---  TIME SPENT:  I, the attending physician, was physically present for the key portions of the evaluation and management (E/M) service provided. The total amount of time spent reviewing the hospital notes, laboratory values, imaging findings, assessing/counseling the patient, discussing with consultant physicians, social work, nursing staff was -- minutes    ---  Primary care provider was made aware of plan for discharge:      [  ] NO     [  ] YES   HPI:  83yoM PMHx AFib on Eliquis, CAD, HTN, DM, HLD, COPD, osteomyelitis presents c/o shortness of breath, chest discomfort. Pt reports onset of rapid heart rate this morning, HR measured 160s when he checked his pulse ox. Also endorses dyspnea exacerbated by movement. Pt reports last episode of AFib in 2020, no episodes since then until today's presentation. Pt states that chest discomfort feels like chest pressure, no chest pain. Pt also reports chronic R big toe wound for the past few months, follows w/ [facility]. States that he has increased sensitivity to R sole of foot, but denies pain, numbness/tingling. Denies fevers, chills, abdominal pain, N/V/D/C.     IN THE ED:  Temp 98  F ,  , /81  ,RR 18 , SpO2 94%  S/P PO , IV diltiazem 10mg x2, IV diltiazem gtt @ 10 mg/hr  Labs significant for BUN/Cr 25/1.6, troponin 507.9, pBNP 355  Imaging:   CXR wet read: no gross abnormalities (25 Apr 2024 12:13)      ---  HOSPITAL COURSE: Patient admitted to medicine floor for management of Afib w/ RVR. Patient was given IV Cardizem x2 in ED and started on Cardizem gtt in ED. Patient converted to NSR and was transitioned to Meotprolol 50mg q8hrs. Patient was monitored on tele. TTE showed: technically difficult study, not well visualized however the LV function is probably normal based on limited views. The right ventricle is not well visualized. probably normal systolic function, moderate thickening of the aortic valve leaflets, structurally normal mitral valve with normal leaflet excursion. Trace tricuspid regurgitation. The inferior vena cava is dilated measuring 2.18 cm in diameter, (dilated >2.1cm) with normal inspiratory collapse (normal >50%) consistent with mildly elevated right atrial pressure (~8, range 5-10mmHg). Estimated pulmonary artery systolic pressure is 26 mmHg, consistent with normal pulmonary artery pressure. Patient also had elevated trops likely 2/2 rapid Afib. Trops downtrended and patient did not complain of chest pain. Patient had a mild MERRILL that improved with IVF. Home furosemide was held. Patient was planned for discharge on 4/27 however patient had acute episode of aphasia. Code stroke was called and patient underwent CT. CT   showed CTA brain: Left M3 branch occlusion. No vascular aneurysm. No AVM. CTA neck showed no flow-limiting stenosis, evidence for arterial dissection, or vascular aneurysm. Patient was made NPO. Patient did not pass bedside swallow assessment and available medications were converted to IV. PO Eliquis was changed to full dose lovenox. SLP evaluated the patient and recommended _______________.     Pt seen and examined on day of discharge. Patient is medically optimized for discharge to home with close outpatient followup.    PHYSICAL EXAM ON DAY OF DISCHARGE:  Vital Signs Last 24 Hrs  T(C): 36.7 (27 Apr 2024 06:37), Max: 36.8 (26 Apr 2024 11:50)  T(F): 98.1 (27 Apr 2024 06:37), Max: 98.3 (26 Apr 2024 11:50)  HR: 87 (27 Apr 2024 06:37) (75 - 92)  BP: 128/87 (27 Apr 2024 06:37) (124/75 - 158/69)  BP(mean): --  RR: 18 (27 Apr 2024 06:37) (18 - 18)  SpO2: 95% (27 Apr 2024 06:37) (92% - 97%)    Parameters below as of 27 Apr 2024 06:37  Patient On (Oxygen Delivery Method): room air        GENERAL: patient appears well, no acute distress, appropriate, pleasant  EYES: sclera clear, no exudates  ENMT: oropharynx clear without erythema, no exudates, moist mucous membranes  NECK: supple, soft, no thyromegaly noted  LUNGS: good air entry bilaterally, clear to auscultation, symmetric breath sounds, no wheezing or rhonchi appreciated  HEART: soft S1/S2, regular rate and rhythm, no murmurs noted, no lower extremity edema  GASTROINTESTINAL: abdomen is soft, nontender, nondistended, normoactive bowel sounds, no palpable masses  INTEGUMENT: good skin turgor, warm skin, appears well perfused  MUSCULOSKELETAL: no clubbing or cyanosis, no obvious deformity  NEUROLOGIC: awake, alert, oriented x3, good muscle tone in 4 extremities, no obvious sensory deficits  PSYCHIATRIC: mood is good, affect is congruent, linear and logical thought process  HEME/LYMPH: no palpable supraclavicular nodules, no obvious ecchymosis or petechiae         ---  CONSULTANTS:   Cardio - Dr. Haney  Neuro - Dr. Underwood     ---  TIME SPENT:  I, the attending physician, was physically present for the key portions of the evaluation and management (E/M) service provided. The total amount of time spent reviewing the hospital notes, laboratory values, imaging findings, assessing/counseling the patient, discussing with consultant physicians, social work, nursing staff was -- minutes    ---  Primary care provider was made aware of plan for discharge:      [  ] NO     [  ] YES   HPI:  83yoM PMHx AFib on Eliquis, CAD, HTN, DM, HLD, COPD, osteomyelitis presents c/o shortness of breath, chest discomfort. Pt reports onset of rapid heart rate this morning, HR measured 160s when he checked his pulse ox. Also endorses dyspnea exacerbated by movement. Pt reports last episode of AFib in 2020, no episodes since then until today's presentation. Pt states that chest discomfort feels like chest pressure, no chest pain. Pt also reports chronic R big toe wound for the past few months, follows w/ [facility]. States that he has increased sensitivity to R sole of foot, but denies pain, numbness/tingling. Denies fevers, chills, abdominal pain, N/V/D/C.     IN THE ED:  Temp 98  F ,  , /81  ,RR 18 , SpO2 94%  S/P PO , IV diltiazem 10mg x2, IV diltiazem gtt @ 10 mg/hr  Labs significant for BUN/Cr 25/1.6, troponin 507.9, pBNP 355  Imaging:   CXR wet read: no gross abnormalities (25 Apr 2024 12:13)      ---  HOSPITAL COURSE: Patient admitted to medicine floor for management of Afib w/ RVR. Patient was given IV Cardizem x2 in ED and started on Cardizem gtt in ED. Patient converted to NSR and was transitioned to Metoprolol 50mg q8hrs. Pt was then ultimately switched to amiodarone for rate control and started on an amiodarone gtt. Patient was monitored on tele. TTE showed: technically difficult study, not well visualized however the LV function is probably normal based on limited views. The right ventricle is not well visualized. probably normal systolic function, moderate thickening of the aortic valve leaflets, structurally normal mitral valve with normal leaflet excursion. Trace tricuspid regurgitation. The inferior vena cava is dilated measuring 2.18 cm in diameter, (dilated >2.1cm) with normal inspiratory collapse (normal >50%) consistent with mildly elevated right atrial pressure (~8, range 5-10mmHg). Estimated pulmonary artery systolic pressure is 26 mmHg, consistent with normal pulmonary artery pressure. Patient also had elevated trops likely 2/2 rapid Afib. Trops downtrended and patient did not complain of chest pain. Patient had a mild MERRILL that improved with IVF. Home furosemide was held. Patient was planned for discharge on 4/27 however patient had acute episode of aphasia. Code stroke was called and patient underwent CT. 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 No vascular aneurysm. No AVM. CTA neck showed no flow-limiting stenosis, evidence for arterial dissection, or vascular aneurysm. Pt was deemed not a candidate for thrombectomy as occlusion was too distal. Patient was made NPO. Patient then was a RRT due to worsening NIH score noted by nursing.  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  ICU was consulted for vasopressor needs to maintain SBP >160 to optimize perfusion. Pt was transferred to ICU for pressure support with phenylephrine to maintain SBPs between 160-180. Home BB was held in setting of permissive hypertension. Eliquis was held given the risk for hemorrhagic conversion, and was started on heparin subQ q8h for DVT PPX. Pt was treated with high intensity statin and ASA. Pt failed formal speech and swallow assessment and had an NGT placed. Pts amiodarone gtt was transitioned to PO through the NGT to finish the load.     Repeat non-contrast CTH on 5/1 demonstrated ******    Pt seen and examined on day of discharge. Patient is medically optimized for discharge to home with close outpatient followup.    PHYSICAL EXAM ON DAY OF DISCHARGE:  Vital Signs Last 24 Hrs  T(C): 36.7 (27 Apr 2024 06:37), Max: 36.8 (26 Apr 2024 11:50)  T(F): 98.1 (27 Apr 2024 06:37), Max: 98.3 (26 Apr 2024 11:50)  HR: 87 (27 Apr 2024 06:37) (75 - 92)  BP: 128/87 (27 Apr 2024 06:37) (124/75 - 158/69)  BP(mean): --  RR: 18 (27 Apr 2024 06:37) (18 - 18)  SpO2: 95% (27 Apr 2024 06:37) (92% - 97%)    Parameters below as of 27 Apr 2024 06:37  Patient On (Oxygen Delivery Method): room air        GENERAL: patient appears well, no acute distress, appropriate, pleasant  EYES: sclera clear, no exudates  ENMT: oropharynx clear without erythema, no exudates, moist mucous membranes  NECK: supple, soft, no thyromegaly noted  LUNGS: good air entry bilaterally, clear to auscultation, symmetric breath sounds, no wheezing or rhonchi appreciated  HEART: soft S1/S2, regular rate and rhythm, no murmurs noted, no lower extremity edema  GASTROINTESTINAL: abdomen is soft, nontender, nondistended, normoactive bowel sounds, no palpable masses  INTEGUMENT: good skin turgor, warm skin, appears well perfused  MUSCULOSKELETAL: no clubbing or cyanosis, no obvious deformity  NEUROLOGIC: awake, alert, oriented x3, good muscle tone in 4 extremities, no obvious sensory deficits  PSYCHIATRIC: mood is good, affect is congruent, linear and logical thought process  HEME/LYMPH: no palpable supraclavicular nodules, no obvious ecchymosis or petechiae         ---  CONSULTANTS:   Cardio - Dr. Haney  Neuro - Dr. Underwood     ---  TIME SPENT:  I, the attending physician, was physically present for the key portions of the evaluation and management (E/M) service provided. The total amount of time spent reviewing the hospital notes, laboratory values, imaging findings, assessing/counseling the patient, discussing with consultant physicians, social work, nursing staff was -- minutes    ---  Primary care provider was made aware of plan for discharge:      [  ] NO     [  ] YES   HPI:  83yoM PMHx AFib on Eliquis, CAD, HTN, DM, HLD, COPD, osteomyelitis presents c/o shortness of breath, chest discomfort. Pt reports onset of rapid heart rate this morning, HR measured 160s when he checked his pulse ox. Also endorses dyspnea exacerbated by movement. Pt reports last episode of AFib in 2020, no episodes since then until today's presentation. Pt states that chest discomfort feels like chest pressure, no chest pain. Pt also reports chronic R big toe wound for the past few months, follows w/ [facility]. States that he has increased sensitivity to R sole of foot, but denies pain, numbness/tingling. Denies fevers, chills, abdominal pain, N/V/D/C.     IN THE ED:  Temp 98  F ,  , /81  ,RR 18 , SpO2 94%  S/P PO , IV diltiazem 10mg x2, IV diltiazem gtt @ 10 mg/hr  Labs significant for BUN/Cr 25/1.6, troponin 507.9, pBNP 355  Imaging:   CXR wet read: no gross abnormalities (25 Apr 2024 12:13)      ---  HOSPITAL COURSE: Patient admitted to medicine floor for management of Afib w/ RVR. Patient was given IV Cardizem x2 in ED and started on Cardizem gtt in ED. Patient converted to NSR and was transitioned to Metoprolol 50mg q8hrs. Pt was then ultimately switched to amiodarone for rate control and started on an amiodarone gtt. Patient was monitored on tele. TTE showed: technically difficult study, not well visualized however the LV function is probably normal based on limited views. The right ventricle is not well visualized. probably normal systolic function, moderate thickening of the aortic valve leaflets, structurally normal mitral valve with normal leaflet excursion. Trace tricuspid regurgitation. The inferior vena cava is dilated measuring 2.18 cm in diameter, (dilated >2.1cm) with normal inspiratory collapse (normal >50%) consistent with mildly elevated right atrial pressure (~8, range 5-10mmHg). Estimated pulmonary artery systolic pressure is 26 mmHg, consistent with normal pulmonary artery pressure. Patient also had elevated trops likely 2/2 rapid Afib. Trops downtrended and patient did not complain of chest pain. Patient had a mild MERRILL that improved with IVF. Home furosemide was held. Patient was planned for discharge on 4/27 however patient had acute episode of aphasia. Code stroke was called and patient underwent CT. 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 No vascular aneurysm. No AVM. CTA neck showed no flow-limiting stenosis, evidence for arterial dissection, or vascular aneurysm. Pt was deemed not a candidate for thrombectomy as occlusion was too distal. Patient was made NPO. Patient then was a RRT due to worsening NIH score noted by nursing.  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  ICU was consulted for vasopressor needs to maintain SBP >160 to optimize perfusion. Pt was transferred to ICU for pressure support with phenylephrine to maintain SBPs between 160-180. Home BB was held in setting of permissive hypertension. Eliquis was held given the risk for hemorrhagic conversion, and was started on heparin subQ q8h for DVT PPX. Pt was treated with high intensity statin and ASA. Pt failed formal speech and swallow assessment and had an NGT placed. Pts amiodarone gtt was transitioned to PO through the NGT to finish the load. Repeat non-contrast CTH on 5/1 redemonstrated multiple evolving acute/subacute infarcts  within the bilateral cerebral and cerebellar hemispheres, most conspicuous within the left inferior division MCA territory and no acute intracranial hemorrhage. Pt also developed intermittent fevers in the setting of an erythematous, swollen and painful LUE. LUE doppler showed no evidence of DVT and pt was monitored off abx. However, symptoms continued to persist so repeat LUE duplex was preformed, which showed *******.    Pt underwent MBS on 5/3 which did not demonstrate any aspiration, so pt was started on pureed diet and NGT removed. However, pt was unable to tolerate PO and started choking on diet. There was concern for aspiration and NGT was replaced.         Pt seen and examined on day of discharge. Patient is medically optimized for discharge to home with close outpatient followup.    PHYSICAL EXAM ON DAY OF DISCHARGE:  Vital Signs Last 24 Hrs  T(C): 36.7 (27 Apr 2024 06:37), Max: 36.8 (26 Apr 2024 11:50)  T(F): 98.1 (27 Apr 2024 06:37), Max: 98.3 (26 Apr 2024 11:50)  HR: 87 (27 Apr 2024 06:37) (75 - 92)  BP: 128/87 (27 Apr 2024 06:37) (124/75 - 158/69)  BP(mean): --  RR: 18 (27 Apr 2024 06:37) (18 - 18)  SpO2: 95% (27 Apr 2024 06:37) (92% - 97%)    Parameters below as of 27 Apr 2024 06:37  Patient On (Oxygen Delivery Method): room air        GENERAL: patient appears well, no acute distress, appropriate, pleasant  EYES: sclera clear, no exudates  ENMT: oropharynx clear without erythema, no exudates, moist mucous membranes  NECK: supple, soft, no thyromegaly noted  LUNGS: good air entry bilaterally, clear to auscultation, symmetric breath sounds, no wheezing or rhonchi appreciated  HEART: soft S1/S2, regular rate and rhythm, no murmurs noted, no lower extremity edema  GASTROINTESTINAL: abdomen is soft, nontender, nondistended, normoactive bowel sounds, no palpable masses  INTEGUMENT: good skin turgor, warm skin, appears well perfused  MUSCULOSKELETAL: no clubbing or cyanosis, no obvious deformity  NEUROLOGIC: awake, alert, oriented x3, good muscle tone in 4 extremities, no obvious sensory deficits  PSYCHIATRIC: mood is good, affect is congruent, linear and logical thought process  HEME/LYMPH: no palpable supraclavicular nodules, no obvious ecchymosis or petechiae         ---  CONSULTANTS:   Cardio - Dr. Haney  Neuro - Dr. Underwood     ---  TIME SPENT:  I, the attending physician, was physically present for the key portions of the evaluation and management (E/M) service provided. The total amount of time spent reviewing the hospital notes, laboratory values, imaging findings, assessing/counseling the patient, discussing with consultant physicians, social work, nursing staff was -- minutes    ---  Primary care provider was made aware of plan for discharge:      [  ] NO     [  ] YES   HPI:  83yoM PMHx AFib on Eliquis, CAD, HTN, DM, HLD, COPD, osteomyelitis presents c/o shortness of breath, chest discomfort. Pt reports onset of rapid heart rate this morning, HR measured 160s when he checked his pulse ox. Also endorses dyspnea exacerbated by movement. Pt reports last episode of AFib in 2020, no episodes since then until today's presentation. Pt states that chest discomfort feels like chest pressure, no chest pain. Pt also reports chronic R big toe wound for the past few months, follows w/ [facility]. States that he has increased sensitivity to R sole of foot, but denies pain, numbness/tingling. Denies fevers, chills, abdominal pain, N/V/D/C.     IN THE ED:  Temp 98  F ,  , /81  ,RR 18 , SpO2 94%  S/P PO , IV diltiazem 10mg x2, IV diltiazem gtt @ 10 mg/hr  Labs significant for BUN/Cr 25/1.6, troponin 507.9, pBNP 355  Imaging:   CXR wet read: no gross abnormalities (25 Apr 2024 12:13)      ---  HOSPITAL COURSE: Patient admitted to medicine floor for management of Afib w/ RVR. Patient was given IV Cardizem x2 in ED and started on Cardizem gtt in ED. Patient converted to NSR and was transitioned to Metoprolol 50mg q8hrs. Pt was then ultimately switched to amiodarone for rate control and started on an amiodarone gtt. Patient was monitored on tele. TTE showed: technically difficult study, not well visualized however the LV function is probably normal based on limited views. The right ventricle is not well visualized. probably normal systolic function, moderate thickening of the aortic valve leaflets, structurally normal mitral valve with normal leaflet excursion. Trace tricuspid regurgitation. The inferior vena cava is dilated measuring 2.18 cm in diameter, (dilated >2.1cm) with normal inspiratory collapse (normal >50%) consistent with mildly elevated right atrial pressure (~8, range 5-10mmHg). Estimated pulmonary artery systolic pressure is 26 mmHg, consistent with normal pulmonary artery pressure. Patient also had elevated trops likely 2/2 rapid Afib. Trops downtrended and patient did not complain of chest pain. Patient had a mild MERRILL that improved with IVF. Home furosemide was held. Patient was planned for discharge on 4/27 however patient had acute episode of aphasia. Code stroke was called and patient underwent CT. 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 No vascular aneurysm. No AVM. CTA neck showed no flow-limiting stenosis, evidence for arterial dissection, or vascular aneurysm. Pt was deemed not a candidate for thrombectomy as occlusion was too distal. Patient was made NPO. Patient then was a RRT due to worsening NIH score noted by nursing.  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  ICU was consulted for vasopressor needs to maintain SBP >160 to optimize perfusion. Pt was transferred to ICU for pressure support with phenylephrine to maintain SBPs between 160-180. Home BB was held in setting of permissive hypertension. Eliquis was held given the risk for hemorrhagic conversion, and was started on heparin subQ q8h for DVT PPX. Pt was treated with high intensity statin and ASA. Pt failed formal speech and swallow assessment and had an NGT placed. Pts amiodarone gtt was transitioned to PO through the NGT to finish the load. Repeat non-contrast CTH on 5/1 redemonstrated multiple evolving acute/subacute infarcts  within the bilateral cerebral and cerebellar hemispheres, most conspicuous within the left inferior division MCA territory and no acute intracranial hemorrhage. Pt also developed intermittent fevers in the setting of an erythematous, swollen and painful LUE. LUE doppler showed no evidence of DVT and pt was monitored off abx. However, symptoms continued to persist so repeat LUE duplex was preformed, which was also negative for DVT. Pt underwent MBS on 5/3 which did not demonstrate any aspiration, so pt was started on pureed diet and NGT removed. However, pt was unable to tolerate PO and started choking on diet. There was concern for aspiration and NGT was replaced. Pt eventually was able to tolerated pureed diet. However, pts BiPAP was continually held at night for increased secretions. Pt stable to go to floors and was transferred to telemetry. Pts home beta blocker was resumed at half the dose as permissive HTN was no longer needed. Pt had repeat CTH to rule out hemorrhagic conversion in the setting of regressing neurologic status/increased lethargy which showed***  Pts hospital course was also complicated by hyperglycemia. Pt was increased to a moderate dose sliding scale and home lantus increased to 15U. Pt was recieving IV ciprofloxacin for chronic osteo that was formulated with dextrose, so once he was able to tolerate PO, he was transitioned to his home ABX. Pt was also receiving D5 IVF for hypernatremia, and this was also discontinued once sodium levels remained WNL. With these interventions, pts blood glucose improved.        Pt seen and examined on day of discharge. Patient is medically optimized for discharge to home with close outpatient followup.    PHYSICAL EXAM ON DAY OF DISCHARGE:  Vital Signs Last 24 Hrs  T(C): 36.7 (27 Apr 2024 06:37), Max: 36.8 (26 Apr 2024 11:50)  T(F): 98.1 (27 Apr 2024 06:37), Max: 98.3 (26 Apr 2024 11:50)  HR: 87 (27 Apr 2024 06:37) (75 - 92)  BP: 128/87 (27 Apr 2024 06:37) (124/75 - 158/69)  BP(mean): --  RR: 18 (27 Apr 2024 06:37) (18 - 18)  SpO2: 95% (27 Apr 2024 06:37) (92% - 97%)    Parameters below as of 27 Apr 2024 06:37  Patient On (Oxygen Delivery Method): room air        GENERAL: patient appears well, no acute distress, appropriate, pleasant  EYES: sclera clear, no exudates  ENMT: oropharynx clear without erythema, no exudates, moist mucous membranes  NECK: supple, soft, no thyromegaly noted  LUNGS: good air entry bilaterally, clear to auscultation, symmetric breath sounds, no wheezing or rhonchi appreciated  HEART: soft S1/S2, regular rate and rhythm, no murmurs noted, no lower extremity edema  GASTROINTESTINAL: abdomen is soft, nontender, nondistended, normoactive bowel sounds, no palpable masses  INTEGUMENT: good skin turgor, warm skin, appears well perfused  MUSCULOSKELETAL: no clubbing or cyanosis, no obvious deformity  NEUROLOGIC: awake, alert, oriented x3, good muscle tone in 4 extremities, no obvious sensory deficits  PSYCHIATRIC: mood is good, affect is congruent, linear and logical thought process  HEME/LYMPH: no palpable supraclavicular nodules, no obvious ecchymosis or petechiae         ---  CONSULTANTS:   Cardio - Dr. Haney  Neuro - Dr. Underwood     ---  TIME SPENT:  I, the attending physician, was physically present for the key portions of the evaluation and management (E/M) service provided. The total amount of time spent reviewing the hospital notes, laboratory values, imaging findings, assessing/counseling the patient, discussing with consultant physicians, social work, nursing staff was -- minutes    ---  Primary care provider was made aware of plan for discharge:      [  ] NO     [  ] YES   HPI:  83yoM PMHx AFib on Eliquis, CAD, HTN, DM, HLD, COPD, osteomyelitis presents c/o shortness of breath, chest discomfort. Pt reports onset of rapid heart rate this morning, HR measured 160s when he checked his pulse ox. Also endorses dyspnea exacerbated by movement. Pt reports last episode of AFib in 2020, no episodes since then until today's presentation. Pt states that chest discomfort feels like chest pressure, no chest pain. Pt also reports chronic R big toe wound for the past few months, follows w/ [facility]. States that he has increased sensitivity to R sole of foot, but denies pain, numbness/tingling. Denies fevers, chills, abdominal pain, N/V/D/C.     IN THE ED:  Temp 98  F ,  , /81  ,RR 18 , SpO2 94%  S/P PO , IV diltiazem 10mg x2, IV diltiazem gtt @ 10 mg/hr  Labs significant for BUN/Cr 25/1.6, troponin 507.9, pBNP 355  Imaging:   CXR wet read: no gross abnormalities (25 Apr 2024 12:13)      ---  HOSPITAL COURSE: Patient admitted to medicine floor for management of Afib w/ RVR. Patient was given IV Cardizem x2 in ED and started on Cardizem gtt in ED. Patient converted to NSR and was transitioned to Metoprolol 50mg q8hrs. Pt was then ultimately switched to amiodarone for rate control and started on an amiodarone gtt. Patient was monitored on tele. TTE showed: technically difficult study, not well visualized however the LV function is probably normal based on limited views. The right ventricle is not well visualized. probably normal systolic function, moderate thickening of the aortic valve leaflets, structurally normal mitral valve with normal leaflet excursion. Trace tricuspid regurgitation. The inferior vena cava is dilated measuring 2.18 cm in diameter, (dilated >2.1cm) with normal inspiratory collapse (normal >50%) consistent with mildly elevated right atrial pressure (~8, range 5-10mmHg). Estimated pulmonary artery systolic pressure is 26 mmHg, consistent with normal pulmonary artery pressure. Patient also had elevated trops likely 2/2 rapid Afib. Trops downtrended and patient did not complain of chest pain. Patient had a mild MERRILL that improved with IVF. Home furosemide was held. Patient was planned for discharge on 4/27 however patient had acute episode of aphasia. Code stroke was called and patient underwent CT. 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 No vascular aneurysm. No AVM. CTA neck showed no flow-limiting stenosis, evidence for arterial dissection, or vascular aneurysm. Pt was deemed not a candidate for thrombectomy as occlusion was too distal. Patient was made NPO. Patient then was a RRT due to worsening NIH score noted by nursing.  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  ICU was consulted for vasopressor needs to maintain SBP >160 to optimize perfusion. Pt was transferred to ICU for pressure support with phenylephrine to maintain SBPs between 160-180. Home BB was held in setting of permissive hypertension. Eliquis was held given the risk for hemorrhagic conversion, and was started on heparin subQ q8h for DVT PPX. Pt was treated with high intensity statin and ASA. Pt failed formal speech and swallow assessment and had an NGT placed. Pts amiodarone gtt was transitioned to PO through the NGT to finish the load. Repeat non-contrast CTH on 5/1 redemonstrated multiple evolving acute/subacute infarcts  within the bilateral cerebral and cerebellar hemispheres, most conspicuous within the left inferior division MCA territory and no acute intracranial hemorrhage. Pt also developed intermittent fevers in the setting of an erythematous, swollen and painful LUE. LUE doppler showed no evidence of DVT and pt was monitored off abx. However, symptoms continued to persist so repeat LUE duplex was preformed, which was also negative for DVT. Pt underwent MBS on 5/3 which did not demonstrate any aspiration, so pt was started on pureed diet and NGT removed. However, pt was unable to tolerate PO and started choking on diet. There was concern for aspiration and NGT was replaced. Pt eventually was able to tolerated pureed diet. However, pts BiPAP was continually held at night for increased secretions. Pt stable to go to floors and was transferred to telemetry. Pts home beta blocker was resumed at half the dose as permissive HTN was no longer needed. Pt had repeat CTH to rule out hemorrhagic conversion in the setting of regressing neurologic status/increased lethargy which showed no acute changes from prior. Pts hospital course was also complicated by hyperglycemia. Pt was increased to a moderate dose sliding scale and home lantus increased to 15U. Pt was recieving IV ciprofloxacin for chronic osteo that was formulated with dextrose, so once he was able to tolerate PO, he was transitioned to his home ABX. Pt was also receiving D5 IVF for hypernatremia, and this was also discontinued once sodium levels remained WNL. With these interventions, pts blood glucose improved. Pt also was becoming more somnolent, and was found to have poor caloric intake in the setting of pocketing food in his mouth/not eating complete meals. Pt was started on marinol, with mild improvements. Plan was discussed with wife, who ultimately decided on PEG placement to ensure adequate nutrition in order to help patient reach rehab goals. PEG was placed on 5/15 with GI. Tube feeds were initiated the following day. Pt was ultimately stable for discharge to [facility]        Pt seen and examined on day of discharge. Patient is medically optimized for discharge to [facility] with close outpatient followup.    PHYSICAL EXAM ON DAY OF DISCHARGE:  Vital Signs Last 24 Hrs  T(C): 36.7 (27 Apr 2024 06:37), Max: 36.8 (26 Apr 2024 11:50)  T(F): 98.1 (27 Apr 2024 06:37), Max: 98.3 (26 Apr 2024 11:50)  HR: 87 (27 Apr 2024 06:37) (75 - 92)  BP: 128/87 (27 Apr 2024 06:37) (124/75 - 158/69)  BP(mean): --  RR: 18 (27 Apr 2024 06:37) (18 - 18)  SpO2: 95% (27 Apr 2024 06:37) (92% - 97%)    Parameters below as of 27 Apr 2024 06:37  Patient On (Oxygen Delivery Method): room air        GENERAL: patient appears well, no acute distress, appropriate, pleasant  EYES: sclera clear, no exudates  ENMT: oropharynx clear without erythema, no exudates, moist mucous membranes  NECK: supple, soft, no thyromegaly noted  LUNGS: good air entry bilaterally, clear to auscultation, symmetric breath sounds, no wheezing or rhonchi appreciated  HEART: soft S1/S2, regular rate and rhythm, no murmurs noted, no lower extremity edema  GASTROINTESTINAL: abdomen is soft, nontender, nondistended, normoactive bowel sounds, no palpable masses  INTEGUMENT: good skin turgor, warm skin, appears well perfused  MUSCULOSKELETAL: no clubbing or cyanosis, no obvious deformity  NEUROLOGIC: awake, alert, oriented x3, good muscle tone in 4 extremities, no obvious sensory deficits  PSYCHIATRIC: mood is good, affect is congruent, linear and logical thought process  HEME/LYMPH: no palpable supraclavicular nodules, no obvious ecchymosis or petechiae         ---  CONSULTANTS:   Cardio - Dr. Haney  Neuro - Dr. Underwood     ---  TIME SPENT:  I, the attending physician, was physically present for the key portions of the evaluation and management (E/M) service provided. The total amount of time spent reviewing the hospital notes, laboratory values, imaging findings, assessing/counseling the patient, discussing with consultant physicians, social work, nursing staff was -- minutes    ---  Primary care provider was made aware of plan for discharge:      [  ] NO     [  ] YES   HPI:  83yoM PMHx AFib on Eliquis, CAD, HTN, DM, HLD, COPD, osteomyelitis presents c/o shortness of breath, chest discomfort. Pt reports onset of rapid heart rate this morning, HR measured 160s when he checked his pulse ox. Also endorses dyspnea exacerbated by movement. Pt reports last episode of AFib in 2020, no episodes since then until today's presentation. Pt states that chest discomfort feels like chest pressure, no chest pain. Pt also reports chronic R big toe wound for the past few months, follows w/ [facility]ound Care. States that he has increased sensitivity to R sole of foot, but denies pain, numbness/tingling. Denies fevers, chills, abdominal pain, N/V/D/C.     IN THE ED:  Temp 98  F ,  , /81  ,RR 18 , SpO2 94%  S/P PO , IV diltiazem 10mg x2, IV diltiazem gtt @ 10 mg/hr  Labs significant for BUN/Cr 25/1.6, troponin 507.9, pBNP 355  Imaging:   CXR wet read: no gross abnormalities (25 Apr 2024 12:13)      ---  HOSPITAL COURSE: Patient admitted to medicine floor for management of Afib w/ RVR. Patient was given IV Cardizem x2 in ED and started on Cardizem gtt in ED. Patient converted to NSR and was transitioned to Metoprolol 50mg q8hrs. Pt was then ultimately switched to amiodarone for rate control and started on an amiodarone gtt. Patient was monitored on tele. TTE showed: technically difficult study, not well visualized however the LV function is probably normal based on limited views. The right ventricle is not well visualized. probably normal systolic function, moderate thickening of the aortic valve leaflets, structurally normal mitral valve with normal leaflet excursion. Trace tricuspid regurgitation. The inferior vena cava is dilated measuring 2.18 cm in diameter, (dilated >2.1cm) with normal inspiratory collapse (normal >50%) consistent with mildly elevated right atrial pressure (~8, range 5-10mmHg). Estimated pulmonary artery systolic pressure is 26 mmHg, consistent with normal pulmonary artery pressure. Patient also had elevated trops likely 2/2 rapid Afib. Trops downtrended and patient did not complain of chest pain. Patient had a mild MERRILL that improved with IVF. Home furosemide was held. Patient was planned for discharge on 4/27 however patient had acute episode of aphasia. Code stroke was called and patient underwent CT. 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 No vascular aneurysm. No AVM. CTA neck showed no flow-limiting stenosis, evidence for arterial dissection, or vascular aneurysm. Pt was deemed not a candidate for thrombectomy as occlusion was too distal. Patient was made NPO. Patient then was a RRT due to worsening NIH score noted by nursing.  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  ICU was consulted for vasopressor needs to maintain SBP >160 to optimize perfusion. Pt was transferred to ICU for pressure support with phenylephrine to maintain SBPs between 160-180. Home BB was held in setting of permissive hypertension. Eliquis was held given the risk for hemorrhagic conversion, and was started on heparin subQ q8h for DVT PPX. Pt was treated with high intensity statin and ASA. Pt failed formal speech and swallow assessment and had an NGT placed. Pts amiodarone gtt was transitioned to PO through the NGT to finish the load. Repeat non-contrast CTH on 5/1 redemonstrated multiple evolving acute/subacute infarcts  within the bilateral cerebral and cerebellar hemispheres, most conspicuous within the left inferior division MCA territory and no acute intracranial hemorrhage. Pt also developed intermittent fevers in the setting of an erythematous, swollen and painful LUE. LUE doppler showed no evidence of DVT and pt was monitored off abx. However, symptoms continued to persist so repeat LUE duplex was preformed, which was also negative for DVT. Pt underwent MBS on 5/3 which did not demonstrate any aspiration, so pt was started on pureed diet and NGT removed. However, pt was unable to tolerate PO and started choking on diet. There was concern for aspiration and NGT was replaced. Pt eventually was able to tolerated pureed diet. However, pts BiPAP was continually held at night for increased secretions. Pt stable to go to floors and was transferred to telemetry. Pts home beta blocker was resumed at half the dose as permissive HTN was no longer needed. Pt had repeat CTH to rule out hemorrhagic conversion in the setting of regressing neurologic status/increased lethargy which showed no acute changes from prior. Pts hospital course was also complicated by hyperglycemia. Pt was increased to a moderate dose sliding scale and home lantus increased to 15U. Pt was recieving IV ciprofloxacin for chronic osteo that was formulated with dextrose, so once he was able to tolerate PO, he was transitioned to his home ABX. Pt was also receiving D5 IVF for MERRILL on CKD and hypernatremia, and this was also discontinued once sodium levels remained WNL. With these interventions, pts blood glucose improved. Pt also was becoming more somnolent, and was found to have poor caloric intake in the setting of pocketing food in his mouth/not eating complete meals. Pt was started on marinol, with mild improvements. Plan was discussed with wife, who ultimately decided on PEG placement to ensure adequate nutrition in order to help patient reach rehab goals. PEG was placed on 5/15 with GI. Tube feeds were initiated the following day. Pt was ultimately stable for discharge to [facility]        Pt seen and examined on day of discharge. Patient is medically optimized for discharge to [facility] with close outpatient followup.    PHYSICAL EXAM ON DAY OF DISCHARGE:  Vital Signs Last 24 Hrs  T(C): 36.7 (27 Apr 2024 06:37), Max: 36.8 (26 Apr 2024 11:50)  T(F): 98.1 (27 Apr 2024 06:37), Max: 98.3 (26 Apr 2024 11:50)  HR: 87 (27 Apr 2024 06:37) (75 - 92)  BP: 128/87 (27 Apr 2024 06:37) (124/75 - 158/69)  BP(mean): --  RR: 18 (27 Apr 2024 06:37) (18 - 18)  SpO2: 95% (27 Apr 2024 06:37) (92% - 97%)    Parameters below as of 27 Apr 2024 06:37  Patient On (Oxygen Delivery Method): room air        GENERAL: patient appears well, no acute distress, appropriate, pleasant  EYES: sclera clear, no exudates  ENMT: oropharynx clear without erythema, no exudates, moist mucous membranes  NECK: supple, soft, no thyromegaly noted  LUNGS: good air entry bilaterally, clear to auscultation, symmetric breath sounds, no wheezing or rhonchi appreciated  HEART: soft S1/S2, regular rate and rhythm, no murmurs noted, no lower extremity edema  GASTROINTESTINAL: abdomen is soft, nontender, nondistended, normoactive bowel sounds, no palpable masses  INTEGUMENT: good skin turgor, warm skin, appears well perfused  MUSCULOSKELETAL: no clubbing or cyanosis, no obvious deformity  NEUROLOGIC: awake, alert, oriented x3, good muscle tone in 4 extremities, no obvious sensory deficits  PSYCHIATRIC: mood is good, affect is congruent, linear and logical thought process  HEME/LYMPH: no palpable supraclavicular nodules, no obvious ecchymosis or petechiae         ---  CONSULTANTS:   Cardio - Dr. Haney  Neuro - Dr. Underwood     ---  TIME SPENT:  I, the attending physician, was physically present for the key portions of the evaluation and management (E/M) service provided. The total amount of time spent reviewing the hospital notes, laboratory values, imaging findings, assessing/counseling the patient, discussing with consultant physicians, social work, nursing staff was -- minutes    ---  Primary care provider was made aware of plan for discharge:      [  ] NO     [  ] YES   HPI:  83yoM PMHx AFib on Eliquis, CAD, HTN, DM, HLD, COPD, osteomyelitis presents c/o shortness of breath, chest discomfort. Pt reports onset of rapid heart rate this morning, HR measured 160s when he checked his pulse ox. Also endorses dyspnea exacerbated by movement. Pt reports last episode of AFib in 2020, no episodes since then until today's presentation. Pt states that chest discomfort feels like chest pressure, no chest pain. Pt also reports chronic R big toe wound for the past few months, follows w/ Wound Care. States that he has increased sensitivity to R sole of foot, but denies pain, numbness/tingling. Denies fevers, chills, abdominal pain, N/V/D/C.     IN THE ED:  Temp 98  F ,  , /81  ,RR 18 , SpO2 94%  S/P PO , IV diltiazem 10mg x2, IV diltiazem gtt @ 10 mg/hr  Labs significant for BUN/Cr 25/1.6, troponin 507.9, pBNP 355  Imaging:   CXR wet read: no gross abnormalities (25 Apr 2024 12:13)      ---  HOSPITAL COURSE: Patient admitted to medicine floor for management of Afib w/ RVR. Patient was given IV Cardizem x2 in ED and started on Cardizem gtt in ED. Patient converted to NSR and was transitioned to Metoprolol 50mg q8hrs. Pt was then ultimately switched to amiodarone for rate control and started on an amiodarone gtt. Patient was monitored on tele. TTE showed: technically difficult study, not well visualized however the LV function is probably normal based on limited views. The right ventricle is not well visualized. probably normal systolic function, moderate thickening of the aortic valve leaflets, structurally normal mitral valve with normal leaflet excursion. Trace tricuspid regurgitation. The inferior vena cava is dilated measuring 2.18 cm in diameter, (dilated >2.1cm) with normal inspiratory collapse (normal >50%) consistent with mildly elevated right atrial pressure (~8, range 5-10mmHg). Estimated pulmonary artery systolic pressure is 26 mmHg, consistent with normal pulmonary artery pressure. Patient also had elevated trops likely 2/2 rapid Afib. Trops downtrended and patient did not complain of chest pain. Patient had a mild MERRILL that improved with IVF. Home furosemide was held. Patient was planned for discharge on 4/27 however patient had acute episode of aphasia. Code stroke was called and patient underwent CT. 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 No vascular aneurysm. No AVM. CTA neck showed no flow-limiting stenosis, evidence for arterial dissection, or vascular aneurysm. Pt was deemed not a candidate for thrombectomy as occlusion was too distal. Patient was made NPO. Patient then was a RRT due to worsening NIH score noted by nursing.  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  ICU was consulted for vasopressor needs to maintain SBP >160 to optimize perfusion. Pt was transferred to ICU for pressure support with phenylephrine to maintain SBPs between 160-180. Home BB was held in setting of permissive hypertension. Eliquis was held given the risk for hemorrhagic conversion, and was started on heparin subQ q8h for DVT PPX. Pt was treated with high intensity statin and ASA. Pt failed formal speech and swallow assessment and had an NGT placed. Pts amiodarone gtt was transitioned to PO through the NGT to finish the load. Repeat non-contrast CTH on 5/1 redemonstrated multiple evolving acute/subacute infarcts  within the bilateral cerebral and cerebellar hemispheres, most conspicuous within the left inferior division MCA territory and no acute intracranial hemorrhage. Pt also developed intermittent fevers in the setting of an erythematous, swollen and painful LUE. LUE doppler showed no evidence of DVT and pt was monitored off abx. However, symptoms continued to persist so repeat LUE duplex was preformed, which was also negative for DVT. Pt underwent MBS on 5/3 which did not demonstrate any aspiration, so pt was started on pureed diet and NGT removed. However, pt was unable to tolerate PO and started choking on diet. There was concern for aspiration and NGT was replaced. Pt eventually was able to tolerated pureed diet. However, pts BiPAP was continually held at night for increased secretions. Pt stable to go to floors and was transferred to telemetry. Pts home beta blocker was resumed at half the dose as permissive HTN was no longer needed. Pt had repeat CTH to rule out hemorrhagic conversion in the setting of regressing neurologic status/increased lethargy which showed no acute changes from prior. Pts hospital course was also complicated by hyperglycemia. Pt was increased to a moderate dose sliding scale and home lantus increased to 15U. Pt was recieving IV ciprofloxacin for chronic osteo that was formulated with dextrose, so once he was able to tolerate PO, he was transitioned to his home ABX. Pt was also receiving D5 IVF for MERRILL on CKD and hypernatremia, and this was also discontinued once sodium levels remained WNL. With these interventions, pts blood glucose improved. Pt also was becoming more somnolent, and was found to have poor caloric intake in the setting of pocketing food in his mouth/not eating complete meals. Pt was started on marinol, with mild improvements. Plan was discussed with wife, who ultimately decided on PEG placement to ensure adequate nutrition in order to help patient reach rehab goals. PEG was placed on 5/15 with GI. Tube feeds were initiated the following day. Pt was ultimately stable for discharge to [facility]  wife bedside aware all dc /tt plan .       Pt seen and examined on day of discharge. Patient is medically optimized for discharge to [facility] with close outpatient followup.    PHYSICAL EXAM ON DAY OF DISCHARGE: 5/17/24  Vital Signs Last 24 Hrs  T(C): 36.7 (27 Apr 2024 06:37), Max: 36.8 (26 Apr 2024 11:50)  T(F): 98.1 (27 Apr 2024 06:37), Max: 98.3 (26 Apr 2024 11:50)  HR: 87 (27 Apr 2024 06:37) (75 - 92)  BP: 128/87 (27 Apr 2024 06:37) (124/75 - 158/69)  BP(mean): --  RR: 18 (27 Apr 2024 06:37) (18 - 18)  SpO2: 95% (27 Apr 2024 06:37) (92% - 97%)    Parameters below as of 27 Apr 2024 06:37  Patient On (Oxygen Delivery Method): room air      PHYSICAL EXAM:  GENERAL: NAD,   HEENT:   nt / nc perrla   CHEST/LUNG:  mild  breath sounds b/l. No accessory muscle use  HEART:  +irregular rhythm. S1, S2  , no tachy   ABDOMEN:  +abdominal dressing intact. BS+, soft, NT/ND  EXTREMITIES: Trace edema b/l  , rt great big toe desquamation   NERVOUS SYSTEM: +dysarthria. +R sided hemiparesis. Follows commands.   skin no wound   gu ext cath           ---  CONSULTANTS:   Cardio - Dr. Haney  Neuro - Dr. Yasmine gilman   ---  TIME SPENT:  I, the attending physician, was physically present for the key portions of the evaluation and management (E/M) service provided. The total amount of time spent reviewing the hospital notes, laboratory values, imaging findings, assessing/counseling the patient, discussing with consultant physicians, social work, nursing staff was -50- minutes    -

## 2024-04-26 NOTE — PROGRESS NOTE ADULT - PROBLEM SELECTOR PLAN 7
Chronic, on home insulin (Lantus 10u)  - consistent carb diet  - hold home insulin  - start low dose sliding scale  - hypoglycemia protocol in place, fingersticks q ac, q hs   - goal -180 in hospital setting, adjust insulin regimen prn  - f/u HbA1c

## 2024-04-26 NOTE — DISCHARGE NOTE PROVIDER - NSDCCPCAREPLAN_GEN_ALL_CORE_FT
PRINCIPAL DISCHARGE DIAGNOSIS  Diagnosis: Atrial fibrillation  Assessment and Plan of Treatment: You were found to have rapid Afib in the hospital on admission. Your heart rate converted back to normal after some IV rate control meds. Please contiue to take Metoprolol 50mg every 8hrs.   Please follow up with your cardiologist in 1-2 weeks.      SECONDARY DISCHARGE DIAGNOSES  Diagnosis: Open wound  Assessment and Plan of Treatment: You have a chronic open wound on your right big toe. Please continue to take your PO Ciprofloxacin for prophylaxis.  Please follow up with your PCP in 1-2 weeks     PRINCIPAL DISCHARGE DIAGNOSIS  Diagnosis: Atrial fibrillation  Assessment and Plan of Treatment: You were found to have rapid Afib in the hospital on admission. Your heart rate converted back to normal after some IV rate control meds. Please contiue to take Metoprolol 50mg every 8hrs.   Please follow up with your cardiologist in 1-2 weeks.      SECONDARY DISCHARGE DIAGNOSES  Diagnosis: Cerebrovascular accident (CVA)  Assessment and Plan of Treatment: You had an episode of difficulty speaking.   You were found to have a stroke on your CT scan - a left sided M3 branch occlusion.  Because you had difficulty swallowing, oral medications were changed to intravenous.  MRI showed ___________.   You worked with physical and occupational therapy.   Please follow up with your primary care provider in 1-2 weeks.    Diagnosis: Open wound  Assessment and Plan of Treatment: You have a chronic open wound on your right big toe. Please continue to take your PO Ciprofloxacin for prophylaxis.  Please follow up with your PCP in 1-2 weeks     PRINCIPAL DISCHARGE DIAGNOSIS  Diagnosis: Cerebrovascular accident (CVA)  Assessment and Plan of Treatment: You had an episode of difficulty speaking.   You were found to have a stroke on your CT scan - a left sided M3 branch occlusion.  Because you had difficulty swallowing, oral medications were changed to intravenous.  MRI showed ___________.   You worked with physical and occupational therapy.   Please follow up with your primary care provider in 1-2 weeks.      SECONDARY DISCHARGE DIAGNOSES  Diagnosis: Atrial fibrillation  Assessment and Plan of Treatment: You were found to have rapid Afib in the hospital on admission. Your heart rate converted back to normal after some IV rate control meds. Please contiue to take Metoprolol 50mg every 8hrs.   Please follow up with your cardiologist in 1-2 weeks.    Diagnosis: Open wound  Assessment and Plan of Treatment: You have a chronic open wound on your right big toe. Please continue to take your PO Ciprofloxacin for prophylaxis.  Please follow up with your PCP in 1-2 weeks    Diagnosis: Type 2 diabetes mellitus  Assessment and Plan of Treatment:      PRINCIPAL DISCHARGE DIAGNOSIS  Diagnosis: Cerebrovascular accident (CVA)  Assessment and Plan of Treatment: You had an episode of difficulty speaking. You were found to have a stroke on your CT scan and MRI - a left sided M3 branch occlusion. This was likely to be an embolic stroke, meaning a blood clot traveled to your brain and cut off blood flow. You were continued on eliquis, aspirin and a statin. You were transferred to the ICU to recieve pressor medication to keep your blood pressure in a targeted range. Because you had difficulty swallowing, oral medications were changed to intravenous and had an NG tube placed. You worked with speech, physical and occupational therapy. You were eventually transitioned to a pureed diet, however the decision to place a PEG tube was made in order to ensure you are getting adequate nutrition so that you have the energy to participate in rehabilitation and get stronger.  - Please CONTINUE eliquis 5mg two times a day  - Please CONTINUE statin once a day  - Please CONTINUE aspirin 81mg once a day  Please follow up with your primary care provider in 1-2 weeks.      SECONDARY DISCHARGE DIAGNOSES  Diagnosis: Atrial fibrillation  Assessment and Plan of Treatment: You were found to have rapid Afib in the hospital on admission. Your heart rate converted back to normal after IV rate control meds, as well as amiodarone which helped get your heart rhythm back to normal sinus rhythm.   - Please CONTINUE Metoprolol  - Please CONTINUE eliquis 5mg two times a day  Please follow up with your PCP and cardiologist in 1-2 weeks.    Diagnosis: Open wound  Assessment and Plan of Treatment: You have a chronic open wound on your right big toe. This was monitored throughout hospitalization. You were also evaluated by podiatry.  - Please CONTINUE to take your Ciprofloxacin for prophylaxis  Please follow up with your PCP in 1-2 weeks    Diagnosis: Type 2 diabetes mellitus  Assessment and Plan of Treatment: You have a history of Type 2 diabetes, for which you take home insulin. Because of your acute condition, as well as you recieving sugar in some of your fluids, your blood glucose was higher than normal. Your home Lantus was increased to 15 units at bedtime with improvement of your blood sugars.  - Please CONTINUE insulin sliding scale  - Please START lantus 15 units at bedtime  Please follow up with your PCP in 1-2 weeks     PRINCIPAL DISCHARGE DIAGNOSIS  Diagnosis: Cerebrovascular accident (CVA)  Assessment and Plan of Treatment: You had an episode of difficulty speaking. You were found to have a stroke on your CT scan and MRI - a left sided M3 branch occlusion. This was likely to be an embolic stroke, meaning a blood clot traveled to your brain and cut off blood flow. You were continued on eliquis, aspirin and a statin. You were transferred to the ICU to recieve pressor medication to keep your blood pressure in a targeted range. Because you had difficulty swallowing, oral medications were changed to intravenous and had an NG tube placed. You worked with speech, physical and occupational therapy. You were eventually transitioned to a pureed diet, however the decision to place a PEG tube was made in order to ensure you are getting adequate nutrition so that you have the energy to participate in rehabilitation and get stronger.  - Please START atorvastatin 80mg once a day  - Please START eliquis at an incrased dose of 5mg two times a day  - Please CONTINUE aspirin 81mg once a day  Please follow up with your primary care provider in 1-2 weeks.      SECONDARY DISCHARGE DIAGNOSES  Diagnosis: Atrial fibrillation  Assessment and Plan of Treatment: You were found to have rapid Afib in the hospital on admission. Your heart rate converted back to normal after IV rate control meds, as well as amiodarone which helped get your heart rhythm back to normal sinus rhythm.   - Please START Metoprolol at a lower dose, 25mg two times a day  - Please CONTINUE Eliquis 5mg two times a day  Please follow up with your PCP and cardiologist in 1-2 weeks.    Diagnosis: Open wound  Assessment and Plan of Treatment: You have a chronic open wound on your right big toe. This was monitored throughout hospitalization. You were also evaluated by podiatry.  - Please CONTINUE to take your Ciprofloxacin 500mg two times a day for prophylaxis  Please follow up with your PCP in 1-2 weeks    Diagnosis: Type 2 diabetes mellitus  Assessment and Plan of Treatment: You have a history of Type 2 diabetes, for which you take home insulin. Because of your acute condition, as well as you recieving sugar in some of your fluids, your blood glucose was higher than normal. Your home Lantus was increased to 15 units at bedtime with improvement of your blood sugars.  - Please CONTINUE insulin sliding scale as needed  - Please START lantus at an increased dose of 15 units at bedtime  - Please CONTINUE ozempic .25mg once a week  Please follow- up with your PCP within 1-2 weeks of discharge    Diagnosis: HTN (hypertension)  Assessment and Plan of Treatment: You have a history of high blood pressure. Your home lasix was held it impacts kidney function.   - Please STOP home furosemide  - Please START Metoprolol at a lower dose, 25mg two times a day  Please follow- up with your PCP within 1-2 weeks of discharge    Diagnosis: Oral thrush  Assessment and Plan of Treatment: You were found to have thrush, which is a fungal infection of the mouth. This could be secondary to steroid inhalers for COPD. You were treated with nyastatin with improvement in the trush.  - Please CONTINUE nyastatin 5mL orally 3 times a day (swish and spit out/swallow)  Please follow- up with your PCP within 1-2 weeks of discharge     PRINCIPAL DISCHARGE DIAGNOSIS  Diagnosis: Cerebrovascular accident (CVA)  Assessment and Plan of Treatment: You had an episode of difficulty speaking. You were found to have a stroke on your CT scan and MRI - a left sided M3 branch occlusion. This was likely to be an embolic stroke, meaning a blood clot traveled to your brain and cut off blood flow. You were continued on eliquis, aspirin and a statin. You were transferred to the ICU to recieve pressor medication to keep your blood pressure in a targeted range. Because you had difficulty swallowing, oral medications were changed to intravenous and had an NG tube placed. You worked with speech, physical and occupational therapy. You were eventually transitioned to a pureed diet, however the decision to place a PEG tube was made in order to ensure you are getting adequate nutrition so that you have the energy to participate in rehabilitation and get stronger.  - Please START atorvastatin 80mg once a day  - Please START eliquis at an incrased dose of 5mg two times a day  - Please CONTINUE aspirin 81mg once a day  - Please START tube feeds through PEG as detailed in your discharge instructions  - Please CONTINUE pureed feeds for pleasure/mouth moisture  - You can take medications ORALLY if you tolerate  Please follow up with your primary care provider in 1-2 weeks.      SECONDARY DISCHARGE DIAGNOSES  Diagnosis: Atrial fibrillation  Assessment and Plan of Treatment: You were found to have rapid Afib in the hospital on admission. Your heart rate converted back to normal after IV rate control meds, as well as amiodarone which helped get your heart rhythm back to normal sinus rhythm.   - Please START Metoprolol at a lower dose, 25mg two times a day  - Please CONTINUE Eliquis 5mg two times a day  Please follow up with your PCP and cardiologist in 1-2 weeks.    Diagnosis: Open wound  Assessment and Plan of Treatment: You have a chronic open wound on your right big toe. This was monitored throughout hospitalization. You were also evaluated by podiatry.  - Please CONTINUE to take your Ciprofloxacin 500mg two times a day for prophylaxis  Please follow up with your PCP in 1-2 weeks    Diagnosis: Type 2 diabetes mellitus  Assessment and Plan of Treatment: You have a history of Type 2 diabetes, for which you take home insulin. Because of your acute condition, as well as you recieving sugar in some of your fluids, your blood glucose was higher than normal. Your home Lantus was increased to 15 units at bedtime with improvement of your blood sugars.  - Please CONTINUE insulin sliding scale as needed  - Please START lantus at an increased dose of 15 units at bedtime  - Please CONTINUE ozempic .25mg once a week  Please follow- up with your PCP within 1-2 weeks of discharge    Diagnosis: HTN (hypertension)  Assessment and Plan of Treatment: You have a history of high blood pressure. Your home lasix was held it impacts kidney function.   - Please STOP home furosemide  - Please START Metoprolol at a lower dose, 25mg two times a day  Please follow- up with your PCP within 1-2 weeks of discharge    Diagnosis: Oral thrush  Assessment and Plan of Treatment: You were found to have thrush, which is a fungal infection of the mouth. This could be secondary to steroid inhalers for COPD. You were treated with nyastatin with improvement in the trush.  - Please CONTINUE nyastatin 5mL orally 3 times a day (swish and spit out/swallow)  Please follow- up with your PCP within 1-2 weeks of discharge    Diagnosis: COPD (chronic obstructive pulmonary disease)  Assessment and Plan of Treatment: You have a history of COPD. Your home inhaler regimen was continued throughout hospitalization, and you also recieved a Nucala injection during your hospitalization. You also required supplemental oxygen with nasal cannula during your hospitalization to keep your blood oxygen levels at a normal level.  - Please CONTINUE your home inhaler regimen  - Please START duoneb nebulizations as needed for shortness of breath/wheezing  - Please CONTINUE nucala once a month  - Please START 2L NC supplemental oxygen as needed, with goal O2 saturation being >88%

## 2024-04-26 NOTE — PROGRESS NOTE ADULT - PROBLEM SELECTOR PLAN 8
Chronic, baseline 2L home O2 and BiPAP qHS  - CXR wet read: no gross abnormalities  - Satting well on RA   - Continue home montelukast  - Continue Bipap overnight   - Pulm Dr. Combs consulted

## 2024-04-26 NOTE — DISCHARGE NOTE PROVIDER - NSDCFUADDINST_GEN_ALL_CORE_FT
pt can take orally   puree diet  and via  peg  for more nutritional.  pt can take orally   puree diet  and via  peg  for more nutritional.     TUBE FEED INSTRUCTIONS:  Tube feeding modality: gastrostomy  Glucerna 1.5  Total volume for 24 hours (mL):  1320  Continuous starting tube feed rate (mL/hr): 10  Increase tube feed rate by (mL): 10 every 4 hours  Until goal tube feed rate (mL per hour): 55  Tube feed duration (in hours): 24  Tube feed start time: 12:00  Free water flush   Pump rate (mL/hr): 40  Frequency: every hour  Start: 12:00

## 2024-04-26 NOTE — CARE COORDINATION ASSESSMENT. - NSPASTMEDSURGHISTORY_GEN_ALL_CORE_FT
PAST MEDICAL & SURGICAL HISTORY:  Dyslipidemia      CAD (Coronary Artery Disease)  s/p 3v CABG 2004; stents placed in [place] in 2019      Diabetes Mellitus, Type II      CABG (Coronary Artery Bypass Graft)  2004      Osteomyelitis      Hypertension      COPD (chronic obstructive pulmonary disease)  on 2L at home and BiPAP at night; intubated 6/18      PVD (peripheral vascular disease)      Compound fracture  left leg      S/P primary angioplasty with coronary stent      History of PAT (paroxysmal atrial tachycardia)      BPH (benign prostatic hyperplasia)      Asthma with COPD      H/O drainage of abscess  Left femur 12/2021      Acute osteomyelitis

## 2024-04-26 NOTE — PROGRESS NOTE ADULT - SUBJECTIVE AND OBJECTIVE BOX
Date/Time Patient Seen:  		  Referring MD:   Data Reviewed	       Patient is a 83y old  Male who presents with a chief complaint of AFib w/ RVR (25 Apr 2024 17:50)      Subjective/HPI     PAST MEDICAL & SURGICAL HISTORY:  Diabetes Mellitus, Type II    CAD (Coronary Artery Disease)  s/p 3v CABG 2004; stents placed in [place] in 2019    Dyslipidemia    Asymptomatic Peripheral Vascular Disease    Osteomyelitis    COPD (chronic obstructive pulmonary disease)  on 2L at home and BiPAP at night; intubated 6/18    Diabetes mellitus    Hypertension    PVD (peripheral vascular disease)    History of PAT (paroxysmal atrial tachycardia)    Asthma with COPD    BPH (benign prostatic hyperplasia)    Acute osteomyelitis    CABG (Coronary Artery Bypass Graft)  2004    Compound fracture  left leg    S/P primary angioplasty with coronary stent    H/O drainage of abscess  Left femur 12/2021          Medication list         MEDICATIONS  (STANDING):  apixaban 2.5 milliGRAM(s) Oral every 12 hours  aspirin enteric coated 81 milliGRAM(s) Oral daily  atorvastatin 80 milliGRAM(s) Oral at bedtime  ciprofloxacin     Tablet 250 milliGRAM(s) Oral every 12 hours  dextrose 10% Bolus 125 milliLiter(s) IV Bolus once  dextrose 5%. 1000 milliLiter(s) (100 mL/Hr) IV Continuous <Continuous>  dextrose 5%. 1000 milliLiter(s) (50 mL/Hr) IV Continuous <Continuous>  dextrose 50% Injectable 25 Gram(s) IV Push once  dextrose 50% Injectable 12.5 Gram(s) IV Push once  glucagon  Injectable 1 milliGRAM(s) IntraMuscular once  insulin glargine Injectable (LANTUS) 6 Unit(s) SubCutaneous at bedtime  insulin lispro (ADMELOG) corrective regimen sliding scale   SubCutaneous three times a day before meals  insulin lispro (ADMELOG) corrective regimen sliding scale   SubCutaneous at bedtime  metoprolol tartrate 50 milliGRAM(s) Oral every 8 hours  montelukast 10 milliGRAM(s) Oral daily  sodium chloride 0.9%. 1000 milliLiter(s) (75 mL/Hr) IV Continuous <Continuous>  tamsulosin 0.4 milliGRAM(s) Oral at bedtime    MEDICATIONS  (PRN):  acetaminophen     Tablet .. 650 milliGRAM(s) Oral every 6 hours PRN Temp greater or equal to 38C (100.4F), Mild Pain (1 - 3)  albuterol    90 MICROgram(s) HFA Inhaler 2 Puff(s) Inhalation every 6 hours PRN Shortness of Breath and/or Wheezing  dextrose Oral Gel 15 Gram(s) Oral once PRN Blood Glucose LESS THAN 70 milliGRAM(s)/deciliter         Vitals log        ICU Vital Signs Last 24 Hrs  T(C): 36.7 (26 Apr 2024 04:21), Max: 37 (25 Apr 2024 13:36)  T(F): 98.1 (26 Apr 2024 04:21), Max: 98.6 (25 Apr 2024 13:36)  HR: 85 (26 Apr 2024 04:21) (74 - 144)  BP: 120/71 (26 Apr 2024 04:21) (107/68 - 144/74)  BP(mean): --  ABP: --  ABP(mean): --  RR: 18 (26 Apr 2024 04:21) (18 - 19)  SpO2: 95% (26 Apr 2024 04:21) (94% - 98%)    O2 Parameters below as of 26 Apr 2024 04:21  Patient On (Oxygen Delivery Method): room air                 Input and Output:  I&O's Detail    25 Apr 2024 07:01  -  26 Apr 2024 05:04  --------------------------------------------------------  IN:    sodium chloride 0.9%: 450 mL  Total IN: 450 mL    OUT:  Total OUT: 0 mL    Total NET: 450 mL          Lab Data                        13.8   7.27  )-----------( 223      ( 25 Apr 2024 10:05 )             44.6     04-25    141  |  105  |  25<H>  ----------------------------<  177<H>  4.0   |  30  |  1.60<H>    Ca    8.7      25 Apr 2024 10:05  Mg     2.1     04-25    TPro  6.8  /  Alb  3.4  /  TBili  0.3  /  DBili  x   /  AST  40<H>  /  ALT  49  /  AlkPhos  109  04-25      CARDIAC MARKERS ( 25 Apr 2024 15:45 )  x     / x     / 64 U/L / x     / 3.5 ng/mL        Review of Systems	      Objective     Physical Examination    heart s1s2  lung dc BS  head nc      Pertinent Lab findings & Imaging      Eliecer:  NO   Adequate UO     I&O's Detail    25 Apr 2024 07:01  -  26 Apr 2024 05:04  --------------------------------------------------------  IN:    sodium chloride 0.9%: 450 mL  Total IN: 450 mL    OUT:  Total OUT: 0 mL    Total NET: 450 mL               Discussed with:     Cultures:	        Radiology

## 2024-04-26 NOTE — DISCHARGE NOTE PROVIDER - DETAILS OF MALNUTRITION DIAGNOSIS/DIAGNOSES
This patient has been assessed with a concern for Malnutrition and was treated during this hospitalization for the following Nutrition diagnosis/diagnoses:     -  05/13/2024: Severe protein-calorie malnutrition

## 2024-04-26 NOTE — PROGRESS NOTE ADULT - ASSESSMENT
83-year-old  black male with history of COPD coronary artery disease hypertension diabetes hyperlipidemia atrial fibrillation on Eliquis as well as osteomyelitis who presents now to the emergency department at [facility] complaining of rapid heart rate    jacy  copd  AF  OP  OA  CAD  HTN  DM  HLD  OM hx    on cipro  vs noted  use NIV as tolerated - JACY    follows with Dr Ryland ashley med  uses NIV - BIPAP night time for JACY - sleep hygiene reviewed  cardio eval - [facility] rx regimen  BP control  DM care  AF rate and rhythm control  TFT  outpatient record reviewed  proventil PRN - Singulair - copd  spoke with WIFE  on emp ABX   wound care

## 2024-04-26 NOTE — DISCHARGE NOTE PROVIDER - CARE PROVIDER_API CALL
Pallavi Rod  Cardiovascular Disease  [address], [address]  [place] [address]  Phone: [phone]  Fax: [phone]  Follow Up Time:     Sarah Avila  Internal Medicine  [address] [place] [id number]  Phone: [phone]  Fax: [phone]  Follow Up Time:

## 2024-04-26 NOTE — DISCHARGE NOTE PROVIDER - DISCHARGE SERVICE FOR PATIENT
on the discharge service for the patient. I have reviewed and made amendments to the documentation where necessary.
You can access the FollowMyHealth Patient Portal offered by [facility] by registering at the following website: [email or web address]. By joining [facility]’s FollowMyHealth portal, you will also be able to view your health information using other applications (apps) compatible with our system.

## 2024-04-26 NOTE — PROGRESS NOTE ADULT - PROBLEM SELECTOR PLAN 1
History of Afib, on home Metoprolol BID and Eliquis   - Initial ECG showed sinus tachycardia @ 116bpm, however repeat ECG showed afib with RVR  - S/P PO , Cardizem 10mg IVP x2 and Cardizem gtt @10cc/hr in ED; patient converted to normal sinus  - Most recent EKbpm, QTc 424, NSR   - Maintaining NSR, HR in the 80's this AM   - Continue Eliquis 2.5mg BID  - Increase to Metoprolol 50mg q8hrs   - F/u TTE  - Cardio rec starting Amiodarone, however, family is hesitant to start   - Thyroid levels WNL   - Monitor and replete lytes, keep K>4, Mg>2  - Cardio consulted

## 2024-04-26 NOTE — PROGRESS NOTE ADULT - SUBJECTIVE AND OBJECTIVE BOX
[facility] -- Génesis Villavicencio Pannella, Patel, Orlin Espinoza  Office # [phone]      Follow Up:    Af  Subjective/Observations:   No events overnight resting comfortably in bed.  No complaints of chest pain, dyspnea, or palpitations reported. No signs of orthopnea or PND.      REVIEW OF SYSTEMS: All other review of systems is negative unless indicated above    PAST MEDICAL & SURGICAL HISTORY:  Diabetes Mellitus, Type II      CAD (Coronary Artery Disease)  s/p 3v CABG ; stents placed in [place]       Dyslipidemia      Osteomyelitis      COPD (chronic obstructive pulmonary disease)  on 2L at home and BiPAP at night; intubated       Hypertension      PVD (peripheral vascular disease)      History of PAT (paroxysmal atrial tachycardia)      Asthma with COPD      BPH (benign prostatic hyperplasia)      Acute osteomyelitis      CABG (Coronary Artery Bypass Graft)        Compound fracture  left leg      S/P primary angioplasty with coronary stent      H/O drainage of abscess  Left femur 2021          MEDICATIONS  (STANDING):  apixaban 2.5 milliGRAM(s) Oral every 12 hours  aspirin enteric coated 81 milliGRAM(s) Oral daily  atorvastatin 80 milliGRAM(s) Oral at bedtime  ciprofloxacin     Tablet 250 milliGRAM(s) Oral every 12 hours  dextrose 10% Bolus 125 milliLiter(s) IV Bolus once  dextrose 5%. 1000 milliLiter(s) (100 mL/Hr) IV Continuous <Continuous>  dextrose 5%. 1000 milliLiter(s) (50 mL/Hr) IV Continuous <Continuous>  dextrose 50% Injectable 25 Gram(s) IV Push once  dextrose 50% Injectable 12.5 Gram(s) IV Push once  glucagon  Injectable 1 milliGRAM(s) IntraMuscular once  insulin glargine Injectable (LANTUS) 6 Unit(s) SubCutaneous at bedtime  insulin lispro (ADMELOG) corrective regimen sliding scale   SubCutaneous three times a day before meals  insulin lispro (ADMELOG) corrective regimen sliding scale   SubCutaneous at bedtime  metoprolol tartrate 50 milliGRAM(s) Oral every 8 hours  montelukast 10 milliGRAM(s) Oral daily  sodium chloride 0.9%. 1000 milliLiter(s) (75 mL/Hr) IV Continuous <Continuous>  tamsulosin 0.4 milliGRAM(s) Oral at bedtime    MEDICATIONS  (PRN):  acetaminophen     Tablet .. 650 milliGRAM(s) Oral every 6 hours PRN Temp greater or equal to 38C (100.4F), Mild Pain (1 - 3)  albuterol    90 MICROgram(s) HFA Inhaler 2 Puff(s) Inhalation every 6 hours PRN Shortness of Breath and/or Wheezing  dextrose Oral Gel 15 Gram(s) Oral once PRN Blood Glucose LESS THAN 70 milliGRAM(s)/deciliter      Allergies    No Known Allergies    Intolerances        Vital Signs Last 24 Hrs  T(C): 36.7 (2024 04:21), Max: 37 (2024 13:36)  T(F): 98.1 (2024 04:21), Max: 98.6 (2024 13:36)  HR: 85 (2024 04:21) (74 - 144)  BP: 120/71 (2024 04:21) (107/68 - 144/74)  BP(mean): --  RR: 18 (2024 04:21) (18 - 19)  SpO2: 95% (2024 04:21) (94% - 98%)    Parameters below as of 2024 04:21  Patient On (Oxygen Delivery Method): room air        I&O's Summary    2024 07:01  -  2024 07:00  --------------------------------------------------------  IN: 825 mL / OUT: 900 mL / NET: -75 mL      Weight (kg): 99.8 ( @ 09:14)    PHYSICAL EXAM:  TELE:   Constitutional: NAD, awake and alert, well-developed  HEENT: Moist Mucous Membranes, Anicteric  Pulmonary: Non-labored, breath sounds are clear bilaterally, No wheezing, crackles or rhonchi  Cardiovascular: Regular, S1 and S2 nl, No murmurs, rubs, gallops or clicks  Gastrointestinal: Bowel Sounds present, soft, nontender.   Lymph: No lymphadenopathy. No peripheral edema.  Skin: No visible rashes or ulcers.  Psych:  Mood & affect appropriate    LABS: All Labs Reviewed:                        13.8   7.27  )-----------( 223      ( 2024 10:05 )             44.6     2024 10:05    141    |  105    |  25     ----------------------------<  177    4.0     |  30     |  1.60     Ca    8.7        2024 10:05  Mg     2.1       2024 10:05    TPro  6.8    /  Alb  3.4    /  TBili  0.3    /  DBili  x      /  AST  40     /  ALT  49     /  AlkPhos  109    2024 10:05    PT/INR - ( 2024 10:05 )   PT: 11.8 sec;   INR: 1.01 ratio         PTT - ( 2024 10:05 )  PTT:29.7 sec  CARDIAC MARKERS ( 2024 15:45 )  x     / x     / 64 U/L / x     / 3.5 ng/mL         EC Lead ECG:   Ventricular Rate 78 BPM    Atrial Rate 78 BPM    P-R Interval 176 ms    QRS Duration 74 ms    Q-T Interval 372 ms    QTC Calculation(Bazett) 424 ms    P Axis 84 degrees    R Axis 82 degrees    T Axis 66 degrees    Diagnosis Line Normal sinus rhythm  Normal ECG  Confirmed by MICHAEL BASS ([id number]) on 2024 7:49:36 AM (24 @ 21:45)       EXAM:  ECHO TTE WO CON COMP W DOPP         PROCEDURE DATE:  2021        INTERPRETATION:  INDICATION: Ischemic heart disease  sonographer KL    Blood Pressure 123/70    Height 180.3 cm     Weight 97.5 kg       BSA 2.2   sq m    Dimensions:  LA 3.0       Normal Values: 2.0 - 4.0 cm  Ao 3.5        Normal Values: 2.0 - 3.8 cm  SEPTUM 1.3       Normal Values: 0.6 - 1.2 cm  PWT 1.0       Normal Values: 0.6 - 1.1 cm  LVIDd 4.3         Normal Values: 3.0 - 5.6 cm  LVIDs 1.8         Normal Values: 1.8 - 4.0 cm      OBSERVATIONS:  Technically difficult and limited study  Mitral Valve: normal, trace physiologic MR.  Aortic Valve/Aorta: Sclerotic trileaflet aortic valve with normal opening.  Tricuspid Valve: Not well-visualized  Pulmonic Valve: Not well-visualized  Left Atrium: normal  Right Atrium: Not well-visualized  Left Ventricle: The left ventricular endocardium is not well-visualized.   Overall normal systolic function with an estimated LVEF of 55%. Cannot   evaluate for segmental abnormalities  Right Ventricle: Not well-visualized  Pericardium: no significant pericardial effusion.  Pulmonary/RV Pressure: estimated PA systolic pressure of 28 mmHg  LV diastolic dysfunction is present        IMPRESSION:  Technically difficult and limited study  The left ventricular endocardium is not well-visualized. Overall normal   systolic function with an estimated LVEF of 55-60%. Cannot evaluate for   segmental abnormalities  The right ventricle is not well-visualized  Sclerotic trileaflet aortic valve, without AI.  Trace physiologic MR  No significant pericardial effusion.    --- End of Report ---              ARISTIDES LEE MD; Attending Cardiologist  This document has been electronically signed. Dec 21 2021  1:38PM      Radiology:         [facility] -- Génesis Villavicencio Pannella, Patel, Orlin Espinoza  Office # [phone]      Follow Up:    Af  Subjective/Observations:     No events overnight resting comfortably in bed.  No complaints of chest pain, dyspnea, or palpitations reported. No signs of orthopnea or PND.  on room air     REVIEW OF SYSTEMS: All other review of systems is negative unless indicated above    PAST MEDICAL & SURGICAL HISTORY:  Diabetes Mellitus, Type II      CAD (Coronary Artery Disease)  s/p 3v CABG ; stents placed in [place] in       Dyslipidemia      Osteomyelitis      COPD (chronic obstructive pulmonary disease)  on 2L at home and BiPAP at night; intubated       Hypertension      PVD (peripheral vascular disease)      History of PAT (paroxysmal atrial tachycardia)      Asthma with COPD      BPH (benign prostatic hyperplasia)      Acute osteomyelitis      CABG (Coronary Artery Bypass Graft)        Compound fracture  left leg      S/P primary angioplasty with coronary stent      H/O drainage of abscess  Left femur 2021          MEDICATIONS  (STANDING):  apixaban 2.5 milliGRAM(s) Oral every 12 hours  aspirin enteric coated 81 milliGRAM(s) Oral daily  atorvastatin 80 milliGRAM(s) Oral at bedtime  ciprofloxacin     Tablet 250 milliGRAM(s) Oral every 12 hours  dextrose 10% Bolus 125 milliLiter(s) IV Bolus once  dextrose 5%. 1000 milliLiter(s) (100 mL/Hr) IV Continuous <Continuous>  dextrose 5%. 1000 milliLiter(s) (50 mL/Hr) IV Continuous <Continuous>  dextrose 50% Injectable 25 Gram(s) IV Push once  dextrose 50% Injectable 12.5 Gram(s) IV Push once  glucagon  Injectable 1 milliGRAM(s) IntraMuscular once  insulin glargine Injectable (LANTUS) 6 Unit(s) SubCutaneous at bedtime  insulin lispro (ADMELOG) corrective regimen sliding scale   SubCutaneous three times a day before meals  insulin lispro (ADMELOG) corrective regimen sliding scale   SubCutaneous at bedtime  metoprolol tartrate 50 milliGRAM(s) Oral every 8 hours  montelukast 10 milliGRAM(s) Oral daily  sodium chloride 0.9%. 1000 milliLiter(s) (75 mL/Hr) IV Continuous <Continuous>  tamsulosin 0.4 milliGRAM(s) Oral at bedtime    MEDICATIONS  (PRN):  acetaminophen     Tablet .. 650 milliGRAM(s) Oral every 6 hours PRN Temp greater or equal to 38C (100.4F), Mild Pain (1 - 3)  albuterol    90 MICROgram(s) HFA Inhaler 2 Puff(s) Inhalation every 6 hours PRN Shortness of Breath and/or Wheezing  dextrose Oral Gel 15 Gram(s) Oral once PRN Blood Glucose LESS THAN 70 milliGRAM(s)/deciliter      Allergies    No Known Allergies    Intolerances        Vital Signs Last 24 Hrs  T(C): 36.7 (2024 04:21), Max: 37 (2024 13:36)  T(F): 98.1 (2024 04:21), Max: 98.6 (2024 13:36)  HR: 85 (2024 04:21) (74 - 144)  BP: 120/71 (2024 04:21) (107/68 - 144/74)  BP(mean): --  RR: 18 (2024 04:21) (18 - 19)  SpO2: 95% (2024 04:21) (94% - 98%)    Parameters below as of 2024 04:21  Patient On (Oxygen Delivery Method): room air        I&O's Summary    2024 07:01  -  2024 07:00  --------------------------------------------------------  IN: 825 mL / OUT: 900 mL / NET: -75 mL      Weight (kg): 99.8 ( @ 09:14)    PHYSICAL EXAM:  TELE:   Constitutional: NAD, awake and alert, well-developed  HEENT: Moist Mucous Membranes, Anicteric  Pulmonary: Non-labored, breath sounds are clear bilaterally, No wheezing, crackles or rhonchi  Cardiovascular: Regular, S1 and S2 nl, No murmurs, rubs, gallops or clicks  Gastrointestinal: Bowel Sounds present, soft, nontender.   Lymph: No lymphadenopathy. No peripheral edema.  Skin: No visible rashes or ulcers.  Psych:  Mood & affect appropriate    LABS: All Labs Reviewed:                        13.8   7.27  )-----------( 223      ( 2024 10:05 )             44.6     2024 10:05    141    |  105    |  25     ----------------------------<  177    4.0     |  30     |  1.60     Ca    8.7        2024 10:05  Mg     2.1       2024 10:05    TPro  6.8    /  Alb  3.4    /  TBili  0.3    /  DBili  x      /  AST  40     /  ALT  49     /  AlkPhos  109    2024 10:05    PT/INR - ( 2024 10:05 )   PT: 11.8 sec;   INR: 1.01 ratio         PTT - ( 2024 10:05 )  PTT:29.7 sec  CARDIAC MARKERS ( 2024 15:45 )  x     / x     / 64 U/L / x     / 3.5 ng/mL         EC Lead ECG:   Ventricular Rate 78 BPM    Atrial Rate 78 BPM    P-R Interval 176 ms    QRS Duration 74 ms    Q-T Interval 372 ms    QTC Calculation(Bazett) 424 ms    P Axis 84 degrees    R Axis 82 degrees    T Axis 66 degrees    Diagnosis Line Normal sinus rhythm  Normal ECG  Confirmed by MICHAEL BASS ([id number]) on 2024 7:49:36 AM (24 @ 21:45)       EXAM:  ECHO TTE WO CON COMP W DOPP         PROCEDURE DATE:  2021        INTERPRETATION:  INDICATION: Ischemic heart disease  sonographer KL    Blood Pressure 123/70    Height 180.3 cm     Weight 97.5 kg       BSA 2.2   sq m    Dimensions:  LA 3.0       Normal Values: 2.0 - 4.0 cm  Ao 3.5        Normal Values: 2.0 - 3.8 cm  SEPTUM 1.3       Normal Values: 0.6 - 1.2 cm  PWT 1.0       Normal Values: 0.6 - 1.1 cm  LVIDd 4.3         Normal Values: 3.0 - 5.6 cm  LVIDs 1.8         Normal Values: 1.8 - 4.0 cm      OBSERVATIONS:  Technically difficult and limited study  Mitral Valve: normal, trace physiologic MR.  Aortic Valve/Aorta: Sclerotic trileaflet aortic valve with normal opening.  Tricuspid Valve: Not well-visualized  Pulmonic Valve: Not well-visualized  Left Atrium: normal  Right Atrium: Not well-visualized  Left Ventricle: The left ventricular endocardium is not well-visualized.   Overall normal systolic function with an estimated LVEF of 55%. Cannot   evaluate for segmental abnormalities  Right Ventricle: Not well-visualized  Pericardium: no significant pericardial effusion.  Pulmonary/RV Pressure: estimated PA systolic pressure of 28 mmHg  LV diastolic dysfunction is present        IMPRESSION:  Technically difficult and limited study  The left ventricular endocardium is not well-visualized. Overall normal   systolic function with an estimated LVEF of 55-60%. Cannot evaluate for   segmental abnormalities  The right ventricle is not well-visualized  Sclerotic trileaflet aortic valve, without AI.  Trace physiologic MR  No significant pericardial effusion.    --- End of Report ---              ARISTIDES LEE MD; Attending Cardiologist  This document has been electronically signed. Dec 21 2021  1:38PM      Radiology:

## 2024-04-26 NOTE — PROGRESS NOTE ADULT - PROBLEM SELECTOR PLAN 2
Elevated trop on admission likely 2/2 Afib w/ RVR   - Most recent EKbpm, QTc 424, NSR   - Trop 507.9 --> 448.2  - F/u TTE   - No complaints of chest pain, low suspicions of ACS --> monitor for signs and symptoms of ischemia   - Cardio consulted

## 2024-04-26 NOTE — PROGRESS NOTE ADULT - PROBLEM SELECTOR PLAN 3
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

## 2024-04-26 NOTE — PROGRESS NOTE ADULT - PROBLEM SELECTOR PLAN 4
MERRILL on admission. Baseline Cr around 1.1  - On admission Cr 1.6  - Continue IV NS @ 75cc/hr x 12 hrs  - Avoid nephrotoxics - hold home furosemide  - F/u BMP in AM

## 2024-04-26 NOTE — DISCHARGE NOTE PROVIDER - NSDCMRMEDTOKEN_GEN_ALL_CORE_FT
apixaban 2.5 mg oral tablet: 1 tab(s) orally every 12 hours  ascorbic acid 500 mg oral tablet: 1 tab(s) orally once a day  aspirin 81 mg oral tablet: orally once a day  cholecalciferol 25 mcg (1000 intl units) oral tablet: 1 tab(s) orally once a day  ciprofloxacin 500 mg oral tablet: 1 tab(s) orally 2 times a day  CoQ10 100 mg oral capsule: 1 cap(s) orally once a day  cyanocobalamin 1000 mcg oral tablet: 1 tab(s) orally once a day  furosemide 20 mg oral tablet: 1 tab(s) orally once a day  Lantus Solostar Pen 100 units/mL subcutaneous solution: 10 unit(s) subcutaneous once a day (at bedtime)   magnesium oxide 400 mg oral tablet: 1 tab(s) orally once a day  metoprolol tartrate 50 mg oral tablet: 1 tab(s) orally every 12 hours  montelukast 10 mg oral tablet: 1 tab(s) orally once a day  Multiple Vitamins oral tablet: 1 tab(s) orally once a day  NovoLOG 100 units/mL subcutaneous solution: Sliding Scale as needed  Nucala 100 mg subcutaneous injection: 100 milligram(s) subcutaneously once a month  Omega-3 1000 mg oral capsule: 1 cap(s) orally once a day  Ozempic 2 mg/3 mL (0.25 mg or 0.5 mg dose) subcutaneous solution: 0.25mg once a week for 28 days then 0.5mg every week  rosuvastatin 20 mg oral tablet: 1 tab(s) orally once a day (at bedtime)  tamsulosin 0.4 mg oral capsule: 1 cap(s) orally once a day (at bedtime)  turmeric 1000 mg oral capsule: 1 cap(s) orally once a day  Vitamin B Complex 100: 1 tab(s) orally once a day  Vitamin B Complex oral tablet: 1 tab(s) orally once a day  zinc sulfate 220 mg oral tablet: 1 tab(s) orally once a day   apixaban 2.5 mg oral tablet: 1 tab(s) orally every 12 hours  ascorbic acid 500 mg oral tablet: 1 tab(s) orally once a day  aspirin 81 mg oral tablet: orally once a day  Breo Ellipta 200 mcg-25 mcg/inh inhalation powder: 1 puff(s) inhaled once a day  cholecalciferol 25 mcg (1000 intl units) oral tablet: 1 tab(s) orally once a day  ciprofloxacin 500 mg oral tablet: 1 tab(s) orally 2 times a day  CoQ10 100 mg oral capsule: 1 cap(s) orally once a day  cyanocobalamin 1000 mcg oral tablet: 1 tab(s) orally once a day  furosemide 20 mg oral tablet: 1 tab(s) orally once a day  Incruse Ellipta 62.5 mcg/inh inhalation powder: 1 inhaler(s) inhaled once a day  Lantus Solostar Pen 100 units/mL subcutaneous solution: 10 unit(s) subcutaneous once a day (at bedtime)   magnesium oxide 400 mg oral tablet: 1 tab(s) orally once a day  metoprolol tartrate 50 mg oral tablet: 1 tab(s) orally every 12 hours  montelukast 10 mg oral tablet: 1 tab(s) orally once a day  Multiple Vitamins oral tablet: 1 tab(s) orally once a day  NovoLOG 100 units/mL subcutaneous solution: Sliding Scale as needed  Nucala 100 mg subcutaneous injection: 100 milligram(s) subcutaneously once a month  Omega-3 1000 mg oral capsule: 1 cap(s) orally once a day  Ozempic 2 mg/3 mL (0.25 mg or 0.5 mg dose) subcutaneous solution: 0.25mg once a week for 28 days then 0.5mg every week  rosuvastatin 20 mg oral tablet: 1 tab(s) orally once a day (at bedtime)  tamsulosin 0.4 mg oral capsule: 1 cap(s) orally once a day (at bedtime)  turmeric 1000 mg oral capsule: 1 cap(s) orally once a day  Vitamin B Complex 100: 1 tab(s) orally once a day  Vitamin B Complex oral tablet: 1 tab(s) orally once a day  zinc sulfate 220 mg oral tablet: 1 tab(s) orally once a day   ascorbic acid 500 mg oral tablet: 1 tab(s) orally once a day  aspirin 81 mg oral tablet: orally once a day  atorvastatin 80 mg oral tablet: 1 tab(s) orally once a day (at bedtime)  Breo Ellipta 200 mcg-25 mcg/inh inhalation powder: 1 puff(s) inhaled once a day  cholecalciferol 25 mcg (1000 intl units) oral tablet: 1 tab(s) orally once a day  ciprofloxacin 500 mg oral tablet: 1 tab(s) orally 2 times a day  CoQ10 100 mg oral capsule: 1 cap(s) orally once a day  cyanocobalamin 1000 mcg oral tablet: 1 tab(s) orally once a day  Eliquis 5 mg oral tablet: 1 tab(s) orally 2 times a day  furosemide 20 mg oral tablet: 1 tab(s) orally once a day  Incruse Ellipta 62.5 mcg/inh inhalation powder: 1 inhaler(s) inhaled once a day  Lantus Solostar Pen 100 units/mL subcutaneous solution: 10 unit(s) subcutaneous once a day (at bedtime)   magnesium oxide 400 mg oral tablet: 1 tab(s) orally once a day  metoprolol tartrate 50 mg oral tablet: 1 tab(s) orally every 12 hours  montelukast 10 mg oral tablet: 1 tab(s) orally once a day  Multiple Vitamins oral tablet: 1 tab(s) orally once a day  NovoLOG 100 units/mL subcutaneous solution: Sliding Scale as needed  Nucala 100 mg subcutaneous injection: 100 milligram(s) subcutaneously once a month  Omega-3 1000 mg oral capsule: 1 cap(s) orally once a day  Ozempic 2 mg/3 mL (0.25 mg or 0.5 mg dose) subcutaneous solution: 0.25mg once a week for 28 days then 0.5mg every week  tamsulosin 0.4 mg oral capsule: 1 cap(s) orally once a day (at bedtime)  turmeric 1000 mg oral capsule: 1 cap(s) orally once a day  Vitamin B Complex 100: 1 tab(s) orally once a day  Vitamin B Complex oral tablet: 1 tab(s) orally once a day  zinc sulfate 220 mg oral tablet: 1 tab(s) orally once a day   amiodarone 200 mg oral tablet: 1 tablet with sensor orally once a day  ascorbic acid 500 mg oral tablet: 1 tab(s) orally once a day  aspirin 81 mg oral tablet: orally once a day  atorvastatin 80 mg oral tablet: 1 tab(s) orally once a day (at bedtime)  Breo Ellipta 200 mcg-25 mcg/inh inhalation powder: 1 puff(s) inhaled once a day  cholecalciferol 25 mcg (1000 intl units) oral tablet: 1 tab(s) orally once a day  ciprofloxacin 500 mg oral tablet: 1 tab(s) orally 2 times a day  Eliquis 5 mg oral tablet: 1 tab(s) orally 2 times a day  Incruse Ellipta 62.5 mcg/inh inhalation powder: 1 inhaler(s) inhaled once a day  insulin glargine 100 units/mL subcutaneous solution: 15 unit(s) subcutaneous once a day (at bedtime)  ipratropium-albuterol 0.5 mg-2.5 mg/3 mL inhalation solution: 3 milliliter(s) inhaled every 6 hours as needed for  bronchospasm  magnesium oxide 400 mg oral tablet: 1 tab(s) orally once a day  metoprolol tartrate 25 mg oral tablet: 1 tab(s) orally 2 times a day  montelukast 10 mg oral tablet: 1 tab(s) orally once a day  NovoLOG 100 units/mL subcutaneous solution: Sliding Scale as needed  Nucala 100 mg subcutaneous injection: 100 milligram(s) subcutaneously once a month  nystatin 100,000 units/mL oral suspension: 5 milliliter(s) orally 3 times a day  Omega-3 1000 mg oral capsule: 1 cap(s) orally once a day  Ozempic 2 mg/3 mL (0.25 mg or 0.5 mg dose) subcutaneous solution: 0.25mg once a week for 28 days then 0.5mg every week  tamsulosin 0.4 mg oral capsule: 1 cap(s) orally once a day (at bedtime)  Vitamin B Complex oral tablet: 1 tab(s) orally once a day  zinc sulfate 220 mg oral tablet: 1 tab(s) orally once a day

## 2024-04-26 NOTE — PROGRESS NOTE ADULT - ATTENDING COMMENTS
Patient was seen and examined by myself. Case was discussed with house staff in details. I have reviewed and agree with the plan as outlined above with edits where appropriate.    HPI:  83yoM PMHx AFib on Eliquis, CAD, HTN, DM, HLD, COPD, osteomyelitis presents c/o shortness of breath, chest discomfort. Pt reports onset of rapid heart rate this morning, HR measured 160s when he checked his pulse ox. Also endorses dyspnea exacerbated by movement. Pt reports last episode of AFib in 2020, no episodes since then until today's presentation. Pt states that chest discomfort feels like chest pressure, no chest pain. Pt also reports chronic R big toe wound for the past few months, follows w/ Wound Care. States that he has increased sensitivity to R sole of foot, but denies pain, numbness/tingling. Denies fevers, chills, abdominal pain, N/V/D/C.     IN THE ED:  Temp 98  F ,  , /81  ,RR 18 , SpO2 94%  S/P PO , IV diltiazem 10mg x2, IV diltiazem gtt @ 10 mg/hr  Labs significant for BUN/Cr 25/1.6, troponin 507.9, pBNP 355  Imaging:   CXR wet read: no gross abnormalities (25 Apr 2024 12:13)      ROS: as in the HPI; all other ROS negative    SH and family history as above    Vital Signs Last 24 Hrs  T(C): 36.8 (26 Apr 2024 11:50), Max: 36.8 (26 Apr 2024 11:50)  T(F): 98.3 (26 Apr 2024 11:50), Max: 98.3 (26 Apr 2024 11:50)  HR: 79 (26 Apr 2024 14:40) (77 - 85)  BP: 158/69 (26 Apr 2024 14:40) (120/71 - 158/69)  BP(mean): --  RR: 18 (26 Apr 2024 11:50) (18 - 19)  SpO2: 92% (26 Apr 2024 11:50) (92% - 98%)    Parameters below as of 26 Apr 2024 11:50  Patient On (Oxygen Delivery Method): room air        GEN: NAD  HEENT- normocephalic; mouth moist  CVS- S1S2+  LUNGS- clear to auscultation; no wheezing  ABD: Soft , nontender, nondistended, Bowel sounds are present  EXTREMITY: no calf tenderness, no cyanosis, no edema  NEURO: AAOx3; non focal neurologic exam; grossly non focal neuro exam  PSYCH: normal affect and behavior  BACK: no swelling or mass;   VASCULAR: distal peripheral pulses present  SKIN: warm and dry.       Labs and imaging reviewed      Patient presenting with Patient is a 83y old  Male who presents with a chief complaint of AFib w/ RVR (26 Apr 2024 13:13)   admitted for   Afib with RVR, off cardizem drip, on metoprolol 50mg q8h, rate controlled at current , TTE done, result pending, cardio eval noted   raspatory acidosis: due to CO2 retention, bipap and oxygen supply.   Right 1st toe ulcer, Left femoral intramedullary abscess: ID eval noted c/w cipro   DM: insulin lantus with RISS , FS goal 100-180    Plan of care discussed with patient  and wife at bedside;  all questions and concerns were addressed.

## 2024-04-26 NOTE — DISCHARGE NOTE PROVIDER - NSDCFUADDAPPT_GEN_ALL_CORE_FT
repeat your cbc and bmp in 3 to 4days .  pt will go with 2 lit nc  and can be taper in rehab to room a ir.

## 2024-04-27 NOTE — PROGRESS NOTE ADULT - ASSESSMENT
83-year-old  black male with history of COPD coronary artery disease hypertension diabetes hyperlipidemia atrial fibrillation on Eliquis as well as osteomyelitis who presents now to the emergency department at [facility] complaining of rapid heart rate    jacy  copd  AF  OP  OA  CAD  HTN  DM  HLD  OM hx    on cipro  vs noted  use NIV as tolerated - JACY  TTE reviewed    follows with Dr Ryland ashley med  uses NIV - BIPAP night time for JACY - sleep hygiene reviewed  cardio eval - [facility] rx regimen  BP control  DM care  AF rate and rhythm control  TFT  outpatient record reviewed  proventil PRN - Singulair - copd  spoke with WIFE  on emp ABX   wound care

## 2024-04-27 NOTE — PROVIDER CONTACT NOTE (EICU) - RECOMMENDATIONS
Admit to ICU  - MRI with multiple infarcs likely embolic in nature; repeat head ct with no hemorrhagic conversion; c/w aspirin and statin; maintain SBP >160 will start phenylephrine to achieve sbp goals  - afib with rvr on admission, currently nsr; c/w amio drip; start phenylephrine given afib with rvr  - NC O2 and nocturnal bipap  - NPO given failed bedside dysphagia; s/s eval in am  - Full dose AC with lovenox  - Cipro fro chronic osteo  - ISS while NPO    Care discussed with bedside provider, JONAH Villarreal and eICU attending. If any additional assistance in care/management of patient required by bedside team, provider/RN/family member advised to push "e-alert" button in patient's room, and details will be discussed at that time.

## 2024-04-27 NOTE — PROGRESS NOTE ADULT - PROBLEM SELECTOR PLAN 2
Elevated trop on admission likely 2/2 Afib w/ RVR   - Most recent EKbpm, QTc 424, NSR   - Trop 507.9 --> 448.2  - TTE 2024: technically difficult study, LV no well visualized however LV probably normal based on limited views, moderate thickening of aortic valve leaflets, trace TR, dilated IVC with normal inspiratory collapse, normal pulmonary artery pressure  - No complaints of chest pain, low suspicions of ACS --> monitor for signs and symptoms of ischemia   - Cardio following History of Afib, on home Metoprolol BID and Eliquis   - Initial ECG showed sinus tachycardia @ 116bpm, however repeat ECG showed afib with RVR  - S/P PO , Cardizem 10mg IVP x2 and Cardizem gtt @10cc/hr in ED; patient converted to normal sinus  - Most recent EKbpm, QTc 424, NSR   - Maintaining NSR, HR in the 80's this AM   - Continue Eliquis 2.5mg BID  - Increase to Metoprolol 50mg q8hrs   - TTE 2024: technically difficult study, LV no well visualized however LV probably normal based on limited views, moderate thickening of aortic valve leaflets, trace TR, dilated IVC with normal inspiratory collapse, normal pulmonary artery pressure  - Cardio rec starting Amiodarone, however, family is hesitant to start   - Thyroid levels WNL   - Monitor and replete lytes, keep K>4, Mg>2  - Cardio following

## 2024-04-27 NOTE — DIETITIAN INITIAL EVALUATION ADULT - PERTINENT MEDS FT
MEDICATIONS  (STANDING):  apixaban 2.5 milliGRAM(s) Oral every 12 hours  aspirin enteric coated 81 milliGRAM(s) Oral daily  atorvastatin 80 milliGRAM(s) Oral at bedtime  ciprofloxacin     Tablet 500 milliGRAM(s) Oral every 12 hours  dextrose 10% Bolus 125 milliLiter(s) IV Bolus once  dextrose 5%. 1000 milliLiter(s) (100 mL/Hr) IV Continuous <Continuous>  dextrose 5%. 1000 milliLiter(s) (50 mL/Hr) IV Continuous <Continuous>  dextrose 50% Injectable 12.5 Gram(s) IV Push once  dextrose 50% Injectable 25 Gram(s) IV Push once  glucagon  Injectable 1 milliGRAM(s) IntraMuscular once  insulin glargine Injectable (LANTUS) 6 Unit(s) SubCutaneous at bedtime  insulin lispro (ADMELOG) corrective regimen sliding scale   SubCutaneous at bedtime  insulin lispro (ADMELOG) corrective regimen sliding scale   SubCutaneous three times a day before meals  metoprolol tartrate 50 milliGRAM(s) Oral every 8 hours  montelukast 10 milliGRAM(s) Oral daily  sodium chloride 0.9%. 1000 milliLiter(s) (75 mL/Hr) IV Continuous <Continuous>  tamsulosin 0.4 milliGRAM(s) Oral at bedtime    MEDICATIONS  (PRN):  acetaminophen     Tablet .. 650 milliGRAM(s) Oral every 6 hours PRN Temp greater or equal to 38C (100.4F), Mild Pain (1 - 3)  albuterol    90 MICROgram(s) HFA Inhaler 2 Puff(s) Inhalation every 6 hours PRN Shortness of Breath and/or Wheezing  dextrose Oral Gel 15 Gram(s) Oral once PRN Blood Glucose LESS THAN 70 milliGRAM(s)/deciliter

## 2024-04-27 NOTE — PROGRESS NOTE ADULT - PROBLEM SELECTOR PLAN 10
DVT Prophylaxis:   - Discussed with pharmacy, patient no longer meets reduced Eliquis dose requirements with improving renal function  - increased Eliquis to 5mg BID with improved renal function DVT Prophylaxis:   - Discussed with pharmacy, patient no longer meets reduced Eliquis dose requirements with improving renal function. Increased Eliquis to 5mg BID with improved renal function  - ADDENDUM:   Patient NPO, did not pass RN bedside dysphagia screen. Unable to tolerate PO medications    Wife Sania updated by Dr. Sky, defer NG tube at this time.   Start FD AC with Lovenox

## 2024-04-27 NOTE — DIETITIAN INITIAL EVALUATION ADULT - OTHER INFO
Patient now s/p code stroke, and at time of interview, patient found to have word finding difficulty, so defers much of interview to his wife at bedside. Was previously on consistent CHO with evening snack/DASH diet, however now NPO pending swallow evaluation/dysphagia screen given s/p stroke. PTA was tolerating regular texture foods/thin liquids PTA. Patient's wife reports patient's -225 pounds, note admit weight of 232 pounds, unclear etiology. Patient's wife does endorse that patient is very sedentary. She does report more recently she has noticed patient's blood glucose levels to be more elevated, fasting in the morning to be 140 g/dL, later in the day (non fasting) 180-200 g/dL. Noted diabetes specialist consult ordered. Patient's wife declines need for diet education at this time.

## 2024-04-27 NOTE — DIETITIAN INITIAL EVALUATION ADULT - REASON FOR ADMISSION
Atrial fibrillation  per H&P:  History of Present Illness:  Reason for Admission: AFib w/ RVR  History of Present Illness:   83yoM PMHx AFib on Eliquis, CAD, HTN, DM, HLD, COPD, osteomyelitis presents c/o shortness of breath, chest discomfort. Pt reports onset of rapid heart rate this morning, HR measured 160s when he checked his pulse ox. Also endorses dyspnea exacerbated by movement. Pt reports last episode of AFib in 2020, no episodes since then until today's presentation. Pt states that chest discomfort feels like chest pressure, no chest pain. Pt also reports chronic R big toe wound for the past few months, follows w/ Wound Care. States that he has increased sensitivity to R sole of foot, but denies pain, numbness/tingling. Denies fevers, chills, abdominal pain, N/V/D/C.

## 2024-04-27 NOTE — DIETITIAN INITIAL EVALUATION ADULT - NS FNS DIET ORDER
Diet, NPO:      Special Instructions for Nursing:  CODE STROKE; NPO until Dysphagia screen or Speech Bedside Swallow Evaluation completed and passed (04-27-24 @ 09:56)

## 2024-04-27 NOTE — DIETITIAN INITIAL EVALUATION ADULT - PROBLEM/PLAN-2
History  Chief Complaint   Patient presents with    Wound Check     Per cousin patient has had wound on R lower leg since middle of June, reports increased heat, pain, and redness  Patient is an 48-year-old female presents to the emergency department for evaluation guarding a wound on her right lower extremity  Patient states that in June she fell and caused a wound on her right lower leg  States her cousin has been applying silver dressings and wound care since then  States that the wound has become more painful red  She denies any fever, chills  Prior to Admission Medications   Prescriptions Last Dose Informant Patient Reported? Taking?   nystatin (MYCOSTATIN) cream   Yes No   Sig: Apply topically 2 (two) times a day   predniSONE 10 mg tablet Not Taking at Unknown time  No No   Sig: 3 tabs daily for 3 days, 2 tabs daily for 3 days, 1 tab daily for 3 days   Patient not taking: Reported on 9/27/2019      Facility-Administered Medications: None       Past Medical History:   Diagnosis Date    Dementia [facility])        Past Surgical History:   Procedure Laterality Date   [address] [facility]       History reviewed  No pertinent family history  I have reviewed and agree with the history as documented  E-Cigarette/Vaping     E-Cigarette/Vaping Substances     Social History     Tobacco Use    Smoking status: Never Smoker    Smokeless tobacco: Never Used   Substance Use Topics    Alcohol use: No    Drug use: No       Review of Systems   Constitutional: Negative for fever  Respiratory: Negative for shortness of breath  Cardiovascular: Negative for chest pain  Skin: Positive for wound  All other systems reviewed and are negative  Physical Exam  Physical Exam  Vitals reviewed  Constitutional:       Appearance: Normal appearance  HENT:      Head: Normocephalic and atraumatic  Cardiovascular:      Rate and Rhythm: Normal rate and regular rhythm  Pulses: Normal pulses  Heart sounds: Normal heart sounds  Pulmonary:      Effort: Pulmonary effort is normal       Breath sounds: Normal breath sounds  Musculoskeletal:        Legs:    Skin:     Capillary Refill: Capillary refill takes less than 2 seconds  Neurological:      General: No focal deficit present  Mental Status: She is alert and oriented to person, place, and time  Psychiatric:         Mood and Affect: Mood normal          Behavior: Behavior normal          Thought Content: Thought content normal          Judgment: Judgment normal          Vital Signs  ED Triage Vitals [07/03/21 2036]   Temperature Pulse Respirations Blood Pressure SpO2   (!) 97 3 °F (36 3 °C) 67 18 120/60 99 %      Temp Source Heart Rate Source Patient Position - Orthostatic VS BP Location FiO2 (%)   Oral Monitor Lying Right arm --      Pain Score       --           Vitals:    07/03/21 2036 07/03/21 2100   BP: 120/60 117/66   Pulse: 67 68   Patient Position - Orthostatic VS: Lying Lying         Visual Acuity      ED Medications  Medications   cephalexin (KEFLEX) capsule 500 mg (500 mg Oral Given 7/3/21 2117)       Diagnostic Studies  Results Reviewed     None                 No orders to display              Procedures  Procedures         ED Course                             SBIRT 20yo+      Most Recent Value   SBIRT (25 yo +)   In order to provide better care to our patients, we are screening all of our patients for alcohol and drug use  Would it be okay to ask you these screening questions? Yes Filed at: 07/03/2021 2119   Initial Alcohol Screen: US AUDIT-C    1  How often do you have a drink containing alcohol?  0 Filed at: 07/03/2021 2119   2  How many drinks containing alcohol do you have on a typical day you are drinking? 0 Filed at: 07/03/2021 2119   3b  FEMALE Any Age, or MALE 65+: How often do you have 4 or more drinks on one occassion?   0 Filed at: 07/03/2021 2119   Audit-C Score  0 Filed at: 07/03/2021 2119   ISAK: How many times in the past year have you    Used an illegal drug or used a prescription medication for non-medical reasons? Never Filed at: 07/03/2021 2119                    [facility]  Number of Diagnoses or Management Options  Non-healing wound of right lower extremity  Diagnosis management comments: Patient is an 18-year-old female presents to the emergency department for evaluation guarding a wound on her right lower extremity  Patient states that in June she fell and caused a wound on her right lower leg  States her cousin has been applying silver dressings and wound care since then  States that the wound has become more painful red  She denies any fever, chills  On examination, patient is a 3 cm diameter chronic wound on her right lower extremity  There is some surrounding erythema and tenderness to palpation consistent with early cellulitis  Patient was started Keflex  She is referred to wound care  Return parameters were discussed  Patient stable for discharge  Risk of Complications, Morbidity, and/or Mortality  Presenting problems: low  Diagnostic procedures: low  Management options: low    Patient Progress  Patient progress: stable      Disposition  Final diagnoses:   Non-healing wound of right lower extremity     Time reflects when diagnosis was documented in both MDM as applicable and the Disposition within this note     Time User Action Codes Description Comment    7/3/2021  9:03 PM Glorious Book Add [A16 512I] Non-healing wound of right lower extremity       ED Disposition     ED Disposition Condition Date/Time Comment    Discharge Good Sat Jul 3, 2021 2[facility] discharge to home/self care              Follow-up Information     Follow up With Specialties Details Why Contact Info Additional [address] Schedule an appointment as soon as possible for a visit   [address] [place] [address] [address] [id number]  [address] [place] [address], [address], [address] [place] [address] Drive            Discharge Medication List as of 7/3/2021  9:04 PM      START taking these medications    Details   cephalexin (KEFLEX) 500 mg capsule Take 1 capsule (500 mg total) by mouth every 6 (six) hours for 10 days, Starting Sat 7/3/2021, Until Tue 7/13/2021, Normal         CONTINUE these medications which have NOT CHANGED    Details   nystatin (MYCOSTATIN) cream Apply topically 2 (two) times a day, Starting Mon 12/10/2018, Until Tue 12/10/2019, Historical Med      predniSONE 10 mg tablet 3 tabs daily for 3 days, 2 tabs daily for 3 days, 1 tab daily for 3 days, Normal           No discharge procedures on file      PDMP Review     None          ED Provider  Electronically Signed by           Vladimir Rao PA-C  07/03/21 7729 DISPLAY PLAN FREE TEXT

## 2024-04-27 NOTE — PROGRESS NOTE ADULT - SUBJECTIVE AND OBJECTIVE BOX
[facility] -- Génesis Villavicencio Pannella, Patel, Orlin Espinoza  Office # [phone]      Follow Up:    Af  Subjective/Observations:     No events overnight resting comfortably in bed.  No complaints of chest pain, dyspnea, or palpitations reported. No signs of orthopnea or PND.    REVIEW OF SYSTEMS: All other review of systems is negative unless indicated above    PAST MEDICAL & SURGICAL HISTORY:  Diabetes Mellitus, Type II      CAD (Coronary Artery Disease)  s/p 3v CABG ; stents placed in [place] in       Dyslipidemia      Osteomyelitis      COPD (chronic obstructive pulmonary disease)  on 2L at home and BiPAP at night; intubated       Hypertension      PVD (peripheral vascular disease)      History of PAT (paroxysmal atrial tachycardia)      Asthma with COPD      BPH (benign prostatic hyperplasia)      Acute osteomyelitis      CABG (Coronary Artery Bypass Graft)        Compound fracture  left leg      S/P primary angioplasty with coronary stent      H/O drainage of abscess  Left femur 2021          MEDICATIONS  (STANDING):  apixaban 2.5 milliGRAM(s) Oral every 12 hours  aspirin enteric coated 81 milliGRAM(s) Oral daily  atorvastatin 80 milliGRAM(s) Oral at bedtime  ciprofloxacin     Tablet 500 milliGRAM(s) Oral every 12 hours  dextrose 10% Bolus 125 milliLiter(s) IV Bolus once  dextrose 5%. 1000 milliLiter(s) (100 mL/Hr) IV Continuous <Continuous>  dextrose 5%. 1000 milliLiter(s) (50 mL/Hr) IV Continuous <Continuous>  dextrose 50% Injectable 25 Gram(s) IV Push once  dextrose 50% Injectable 12.5 Gram(s) IV Push once  glucagon  Injectable 1 milliGRAM(s) IntraMuscular once  insulin glargine Injectable (LANTUS) 6 Unit(s) SubCutaneous at bedtime  insulin lispro (ADMELOG) corrective regimen sliding scale   SubCutaneous three times a day before meals  insulin lispro (ADMELOG) corrective regimen sliding scale   SubCutaneous at bedtime  metoprolol tartrate 50 milliGRAM(s) Oral every 8 hours  montelukast 10 milliGRAM(s) Oral daily  sodium chloride 0.9%. 1000 milliLiter(s) (75 mL/Hr) IV Continuous <Continuous>  tamsulosin 0.4 milliGRAM(s) Oral at bedtime    MEDICATIONS  (PRN):  acetaminophen     Tablet .. 650 milliGRAM(s) Oral every 6 hours PRN Temp greater or equal to 38C (100.4F), Mild Pain (1 - 3)  albuterol    90 MICROgram(s) HFA Inhaler 2 Puff(s) Inhalation every 6 hours PRN Shortness of Breath and/or Wheezing  dextrose Oral Gel 15 Gram(s) Oral once PRN Blood Glucose LESS THAN 70 milliGRAM(s)/deciliter      Allergies    No Known Allergies    Intolerances        Vital Signs Last 24 Hrs  T(C): 36.7 (2024 06:37), Max: 36.8 (2024 11:50)  T(F): 98.1 (2024 06:37), Max: 98.3 (2024 11:50)  HR: 87 (2024 06:37) (75 - 92)  BP: 128/87 (2024 06:37) (124/75 - 158/69)  BP(mean): --  RR: 18 (2024 06:37) (18 - 18)  SpO2: 95% (2024 06:37) (92% - 97%)    Parameters below as of 2024 06:37  Patient On (Oxygen Delivery Method): room air        I&O's Summary    2024 07:01  -  2024 07:00  --------------------------------------------------------  IN: 0 mL / OUT: 875 mL / NET: -875 mL          PHYSICAL EXAM:  TELE: SR  Constitutional: NAD, awake and alert, well-developed  HEENT: Moist Mucous Membranes, Anicteric  Pulmonary: Non-labored, breath sounds are clear bilaterally, No wheezing, crackles or rhonchi  Cardiovascular: Regular, S1 and S2 nl, No murmurs, rubs, gallops or clicks  Gastrointestinal: Bowel Sounds present, soft, nontender.   Lymph: No lymphadenopathy. No peripheral edema.  Skin: No visible rashes or ulcers.  Psych:  Mood & affect appropriate    LABS: All Labs Reviewed:                        13.0   6.60  )-----------( 190      ( 2024 08:05 )             40.9                         13.8   7.27  )-----------( 223      ( 2024 10:05 )             44.6     2024 08:05    143    |  108    |  19     ----------------------------<  140    3.9     |  29     |  1.30   2024 10:05    141    |  105    |  25     ----------------------------<  177    4.0     |  30     |  1.60     Ca    8.6        2024 08:05  Ca    8.7        2024 10:05  Phos  2.4       2024 08:05  Mg     2.1       2024 08:05  Mg     2.1       2024 10:05    TPro  5.9    /  Alb  2.8    /  TBili  0.5    /  DBili  x      /  AST  22     /  ALT  37     /  AlkPhos  98     2024 08:05  TPro  6.8    /  Alb  3.4    /  TBili  0.3    /  DBili  x      /  AST  40     /  ALT  49     /  AlkPhos  109    2024 10:05    PT/INR - ( 2024 10:05 )   PT: 11.8 sec;   INR: 1.01 ratio         PTT - ( 2024 10:05 )  PTT:29.7 sec  CARDIAC MARKERS ( 2024 15:45 )  x     / x     / 64 U/L / x     / 3.5 ng/mL         EC Lead ECG:   Ventricular Rate 78 BPM    Atrial Rate 78 BPM    P-R Interval 176 ms    QRS Duration 74 ms    Q-T Interval 372 ms    QTC Calculation(Bazett) 424 ms    P Axis 84 degrees    R Axis 82 degrees    T Axis 66 degrees    Diagnosis Line Normal sinus rhythm  Normal ECG  Confirmed by MICHAEL BASS ([id number]) on 2024 7:49:36 AM (24 @ 21:45)      TRANSTHORACIC ECHOCARDIOGRAM REPORT  ________________________________________________________________________________                                      _______       Pt. Name:       YUE COTTO Study Date:    2024  MRN:            [id number]            YOB: 1940  Accession #:    [id number]           Age:           83 years  Account#:       [id number]          Gender:        M  Visit ID#  Heart Rate:                         Height:        70.08 in (178.00 cm)  Rhythm:                            Weight:        220.46 lb (100.00 kg)  Blood Pressure: 158/69 mmHg         BSA/BMI:       2.18 m² / 31.56 kg/m²  ________________________________________________________________________________________  Referring Physician:   [id number] Marc Grossman  Interpreting Physician: Cookie Ordaz MD  Primary Sonographer:    Clayton Wilkinson    CPT:               ECHO TTE WO CON COMP W DOPP - 53965.m  Indication(s):     Unspecified atrial fibrillation - I48.91  Procedure:         Transthoracic echocardiogram with 2-D, M-mode and complete                     spectral and color flow Doppler.  Ordering Location: [facility]  Admission Status:  Inpatient  Study Information: Image quality for this study is technically difficult.    _______________________________________________________________________________________     CONCLUSIONS:      1. Technically difficult image quality.   2. The LV is not well visualized, however the LV function is probably normal based on limited views.   3. The right ventricle is not well visualized. probably normal systolic function.   4. There is moderate thickening of the aortic valve leaflets.   5. Structurally normal mitral valve with normal leaflet excursion.   6. Trace tricuspid regurgitation.   7. The inferior vena cava is dilated measuring 2.18 cm in diameter, (dilated >2.1cm) with normal inspiratory collapse (normal >50%) consistent with mildly elevated right atrial pressure (~8, range 5-10mmHg).   8. Estimated pulmonary artery systolic pressure is 26 mmHg, consistent with normal pulmonary artery pressure.    ________________________________________________________________________________________  FINDINGS:     Left Ventricle:  There is normal left ventricular diastolic function. Left ventricular endocardium is not well visualized.     Right Ventricle:  The right ventricle is not well visualized. Probably normal systolic function.     Left Atrium:  The left atrium is normal in size with an indexed volume of 16.30 ml/m².     Right Atrium:  The right atrium is normal in size.     Aortic Valve:  The aortic valve anatomy cannot be determined with normal systolic excursion. There is mild calcification of the aortic valve leaflets. There is moderate thickening of the aortic valve leaflets.     Mitral Valve:  Structurally normal mitral valve with normal leaflet excursion. There is trace mitral regurgitation.     Tricuspid Valve:  The tricuspid valve was not well visualized. There is trace tricuspid regurgitation. Estimated pulmonary artery systolic pressure is 26 mmHg, consistent with normal pulmonary artery pressure.     Pulmonic Valve:  The pulmonic valve was not well visualized.     Aorta:  The aortic root at the sinuses of Valsalva is normal in size, measuring 3.30 cm (indexed 1.52 cm/m²).     Systemic Veins:  The inferior vena cava is dilated measuring 2.18 cm in diameter, (dilated >2.1cm) with normal inspiratory collapse (normal >50%) consistent with mildly elevated right atrial pressure (~8, range 5-10mmHg).  ____________________________________________________________________  QUANTITATIVE DATA:  Left Ventricle Measurements: (Indexed to BSA)     IVSd (2D):   1.1 cm  LVPWd (2D):  0.9 cm  LVIDd (2D):  4.0 cm  LVIDs (2D):  2.8 cm  LV Mass:     129 g  59.1 g/m²     MVE Vmax:    0.59 m/s  MV A Vmax:    0.85 m/s  MV E/A:       0.69  e' lateral:   6.96 cm/s  e' medial:    5.98 cm/s  E/e' lateral: 8.43  E/e' medial:  9.82  E/e' Average: 9.07  MV DT:        195 msec    Aorta Measurements: (Normal range) (Indexed to BSA)     Sinuses of Valsalva: 3.30 cm (3.1 - 3.7 cm)       Left Atrium Measurements: (Indexed to BSA)  LA Diam 2D: 3.30 cm       LVOT / RVOT/ Qp/Qs Data: (Indexed to BSA)  LVOT Diameter: 1.80 cm  LVOT Area:     2.54 cm²    Mitral Valve Measurements:     MV E Vmax: 0.6 m/s  MV A Vmax: 0.8 m/s  MV E/A:    0.7       Tricuspid Valve Measurements:     TR Vmax:          2.1 m/s  TR Peak Gradient: 18.1 mmHg  RA Pressure:      8 mmHg  PASP:             26 mmHg    ________________________________________________________________________________________  Electronically signed on 2024 at 4:46:45 PM by Cookie Ordaz MD         *** Final ***      Radiology:

## 2024-04-27 NOTE — PROGRESS NOTE ADULT - PROBLEM SELECTOR PLAN 9
DVT Prophylaxis: Eliquis 2.5mg BID Chronic, baseline 2L home O2 and BiPAP qHS  - CXR wet read: no gross abnormalities  - Satting well on RA   - Continue home montelukast  - Continue Bipap overnight   - Pulm Dr. Combs consulted

## 2024-04-27 NOTE — PROGRESS NOTE ADULT - PROBLEM SELECTOR PLAN 3
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  - R big toe dressing c/d/i  - Continue Cipro 250mg BID (renally dosed)  - Can increase back to home dose Cipro 500mg BID pending improvement of kidney function   - Per podiatry, nothing to culture at this time   - Continue local wound care   - f/u MRSA/MSSA PCR  - ID following   - Podiatry following Elevated trop on admission likely 2/2 Afib w/ RVR   - Most recent EKbpm, QTc 424, NSR   - Trop 507.9 --> 448.2  - TTE 2024: technically difficult study, LV no well visualized however LV probably normal based on limited views, moderate thickening of aortic valve leaflets, trace TR, dilated IVC with normal inspiratory collapse, normal pulmonary artery pressure  - No complaints of chest pain, low suspicions of ACS --> monitor for signs and symptoms of ischemia   - Cardio following

## 2024-04-27 NOTE — PROGRESS NOTE ADULT - SUBJECTIVE AND OBJECTIVE BOX
Patient is a 83y old  Male who presents with a chief complaint of Atrial fibrillation  per H&P:  History of Present Illness:  Reason for Admission: AFib w/ RVR  History of Present Illness:   83yoM PMHx AFib on Eliquis, CAD, HTN, DM, HLD, COPD, osteomyelitis presents c/o shortness of breath, chest discomfort. Pt reports onset of rapid heart rate this morning, HR measured 160s when he checked his pulse ox. Also endorses dyspnea exacerbated by movement. Pt reports last episode of AFib in 2020, no episodes since then until today's presentation. Pt states that chest discomfort feels like chest pressure, no chest pain. Pt also reports chronic R big toe wound for the past few months, follows w/ Wound Care. States that he has increased sensitivity to R sole of foot, but denies pain, numbness/tingling. Denies fevers, chills, abdominal pain, N/V/D/C.        (27 Apr 2024 10:52)      INTERVAL HPI/OVERNIGHT EVENTS: Patient seen and examined at bedside. No overnight events occurred. Patient has no complaints at this time, interactive and aware of plan to go home today. Will be picked up by his wife.  Denies chest pain, dyspnea, palpitations, pain    MEDICATIONS  (STANDING):  apixaban 5 milliGRAM(s) Oral two times a day  aspirin enteric coated 81 milliGRAM(s) Oral daily  atorvastatin 80 milliGRAM(s) Oral at bedtime  ciprofloxacin     Tablet 500 milliGRAM(s) Oral every 12 hours  dextrose 10% Bolus 125 milliLiter(s) IV Bolus once  dextrose 5%. 1000 milliLiter(s) (100 mL/Hr) IV Continuous <Continuous>  dextrose 5%. 1000 milliLiter(s) (50 mL/Hr) IV Continuous <Continuous>  dextrose 50% Injectable 25 Gram(s) IV Push once  dextrose 50% Injectable 12.5 Gram(s) IV Push once  glucagon  Injectable 1 milliGRAM(s) IntraMuscular once  insulin glargine Injectable (LANTUS) 6 Unit(s) SubCutaneous at bedtime  insulin lispro (ADMELOG) corrective regimen sliding scale   SubCutaneous three times a day before meals  insulin lispro (ADMELOG) corrective regimen sliding scale   SubCutaneous at bedtime  lactated ringers. 1000 milliLiter(s) (60 mL/Hr) IV Continuous <Continuous>  metoprolol tartrate 50 milliGRAM(s) Oral every 8 hours  montelukast 10 milliGRAM(s) Oral daily  sodium chloride 0.9%. 1000 milliLiter(s) (75 mL/Hr) IV Continuous <Continuous>  tamsulosin 0.4 milliGRAM(s) Oral at bedtime    MEDICATIONS  (PRN):  acetaminophen     Tablet .. 650 milliGRAM(s) Oral every 6 hours PRN Temp greater or equal to 38C (100.4F), Mild Pain (1 - 3)  albuterol    0.083% 2.5 milliGRAM(s) Nebulizer every 6 hours PRN Bronchospasm  dextrose Oral Gel 15 Gram(s) Oral once PRN Blood Glucose LESS THAN 70 milliGRAM(s)/deciliter      Allergies    No Known Allergies    Intolerances        REVIEW OF SYSTEMS:  CONSTITUTIONAL: No fever or chills  HEENT:  No headache, no sore throat  RESPIRATORY: No cough, wheezing, or shortness of breath  CARDIOVASCULAR: No chest pain, palpitations  GASTROINTESTINAL: No abd pain, nausea, vomiting, or diarrhea  GENITOURINARY: No dysuria, frequency, or hematuria  NEUROLOGICAL: no focal weakness or dizziness  MUSCULOSKELETAL: no myalgias     Vital Signs Last 24 Hrs  T(C): 36.4 (27 Apr 2024 11:37), Max: 36.8 (26 Apr 2024 20:17)  T(F): 97.6 (27 Apr 2024 11:37), Max: 98.3 (26 Apr 2024 20:17)  HR: 98 (27 Apr 2024 11:37) (75 - 98)  BP: 180/83 (27 Apr 2024 11:37) (124/75 - 180/83)  BP(mean): --  RR: 18 (27 Apr 2024 11:37) (18 - 18)  SpO2: 97% (27 Apr 2024 11:37) (94% - 97%)    Parameters below as of 27 Apr 2024 11:37  Patient On (Oxygen Delivery Method): room air        PHYSICAL EXAM:  GENERAL: NAD  HEENT:  anicteric, moist mucous membranes  CHEST/LUNG:  CTA b/l, no rales, wheezes, or rhonchi  HEART:  RRR, S1, S2  ABDOMEN:  BS+, soft, nontender, nondistended  EXTREMITIES: no edema, cyanosis, or calf tenderness  NERVOUS SYSTEM: awake and alert, answers questions and follows commands appropriately    LABS:                        13.6   5.90  )-----------( 178      ( 27 Apr 2024 10:38 )             43.1     CBC Full  -  ( 27 Apr 2024 10:38 )  WBC Count : 5.90 K/uL  Hemoglobin : 13.6 g/dL  Hematocrit : 43.1 %  Platelet Count - Automated : 178 K/uL  Mean Cell Volume : 85.9 fl  Mean Cell Hemoglobin : 27.1 pg  Mean Cell Hemoglobin Concentration : 31.6 gm/dL  Auto Neutrophil # : 3.93 K/uL  Auto Lymphocyte # : 1.04 K/uL  Auto Monocyte # : 0.82 K/uL  Auto Eosinophil # : 0.05 K/uL  Auto Basophil # : 0.03 K/uL  Auto Neutrophil % : 66.7 %  Auto Lymphocyte % : 17.6 %  Auto Monocyte % : 13.9 %  Auto Eosinophil % : 0.8 %  Auto Basophil % : 0.5 %    27 Apr 2024 10:38    140    |  107    |  18     ----------------------------<  267    3.9     |  28     |  1.40     Ca    8.6        27 Apr 2024 10:38  Phos  2.4       27 Apr 2024 07:25  Mg     2.0       27 Apr 2024 07:25    TPro  6.1    /  Alb  2.9    /  TBili  0.5    /  DBili  x      /  AST  23     /  ALT  31     /  AlkPhos  104    27 Apr 2024 10:38    PT/INR - ( 27 Apr 2024 10:38 )   PT: 13.3 sec;   INR: 1.14 ratio         PTT - ( 27 Apr 2024 10:38 )  PTT:31.0 sec  Urinalysis Basic - ( 27 Apr 2024 10:38 )    Color: x / Appearance: x / SG: x / pH: x  Gluc: 267 mg/dL / Ketone: x  / Bili: x / Urobili: x   Blood: x / Protein: x / Nitrite: x   Leuk Esterase: x / RBC: x / WBC x   Sq Epi: x / Non Sq Epi: x / Bacteria: x      CAPILLARY BLOOD GLUCOSE      POCT Blood Glucose.: 236 mg/dL (27 Apr 2024 09:53)  POCT Blood Glucose.: 145 mg/dL (27 Apr 2024 07:48)  POCT Blood Glucose.: 160 mg/dL (26 Apr 2024 21:16)  POCT Blood Glucose.: 164 mg/dL (26 Apr 2024 16:53)  POCT Blood Glucose.: 187 mg/dL (26 Apr 2024 12:13)          RADIOLOGY & ADDITIONAL TESTS:    Personally reviewed.     Consultant(s) Notes Reviewed:  [x] YES  [ ] NO     Patient is a 83y old  Male who presents with a chief complaint of Atrial fibrillation  per H&P:  History of Present Illness:  Reason for Admission: AFib w/ RVR  History of Present Illness:   83yoM PMHx AFib on Eliquis, CAD, HTN, DM, HLD, COPD, osteomyelitis presents c/o shortness of breath, chest discomfort. Pt reports onset of rapid heart rate this morning, HR measured 160s when he checked his pulse ox. Also endorses dyspnea exacerbated by movement. Pt reports last episode of AFib in 2020, no episodes since then until today's presentation. Pt states that chest discomfort feels like chest pressure, no chest pain. Pt also reports chronic R big toe wound for the past few months, follows w/ Wound Care. States that he has increased sensitivity to R sole of foot, but denies pain, numbness/tingling. Denies fevers, chills, abdominal pain, N/V/D/C.        (27 Apr 2024 10:52)      INTERVAL HPI/OVERNIGHT EVENTS: Patient seen and examined at bedside. No overnight events occurred. Patient has no complaints at this time, interactive and aware of plan to go home today. Will be picked up by his wife.  Denies chest pain, dyspnea, palpitations, pain    ADDENDUM: Patient seen at 9:39 for RN reporting aphasia. Patient's wife at bedside reported patient not at his normal baseline mentation and unable to speak clearly. CODE STROKE called at 9:41 for asphasia. Patient also found to have RUE weakness and R sided facial droop. EKG without acute ischemic change, NSR. CT scan demonstrating left M3 occlusion. See Code Stroke note for further details.   MEDICATIONS  (STANDING):  apixaban 5 milliGRAM(s) Oral two times a day  aspirin enteric coated 81 milliGRAM(s) Oral daily  atorvastatin 80 milliGRAM(s) Oral at bedtime  ciprofloxacin     Tablet 500 milliGRAM(s) Oral every 12 hours  dextrose 10% Bolus 125 milliLiter(s) IV Bolus once  dextrose 5%. 1000 milliLiter(s) (100 mL/Hr) IV Continuous <Continuous>  dextrose 5%. 1000 milliLiter(s) (50 mL/Hr) IV Continuous <Continuous>  dextrose 50% Injectable 25 Gram(s) IV Push once  dextrose 50% Injectable 12.5 Gram(s) IV Push once  glucagon  Injectable 1 milliGRAM(s) IntraMuscular once  insulin glargine Injectable (LANTUS) 6 Unit(s) SubCutaneous at bedtime  insulin lispro (ADMELOG) corrective regimen sliding scale   SubCutaneous three times a day before meals  insulin lispro (ADMELOG) corrective regimen sliding scale   SubCutaneous at bedtime  lactated ringers. 1000 milliLiter(s) (60 mL/Hr) IV Continuous <Continuous>  metoprolol tartrate 50 milliGRAM(s) Oral every 8 hours  montelukast 10 milliGRAM(s) Oral daily  sodium chloride 0.9%. 1000 milliLiter(s) (75 mL/Hr) IV Continuous <Continuous>  tamsulosin 0.4 milliGRAM(s) Oral at bedtime    MEDICATIONS  (PRN):  acetaminophen     Tablet .. 650 milliGRAM(s) Oral every 6 hours PRN Temp greater or equal to 38C (100.4F), Mild Pain (1 - 3)  albuterol    0.083% 2.5 milliGRAM(s) Nebulizer every 6 hours PRN Bronchospasm  dextrose Oral Gel 15 Gram(s) Oral once PRN Blood Glucose LESS THAN 70 milliGRAM(s)/deciliter      Allergies    No Known Allergies    Intolerances        REVIEW OF SYSTEMS:  CONSTITUTIONAL: No fever or chills  HEENT:  No headache, no sore throat  RESPIRATORY: No cough, wheezing, or shortness of breath  CARDIOVASCULAR: No chest pain, palpitations  GASTROINTESTINAL: No abd pain, nausea, vomiting, or diarrhea  GENITOURINARY: No dysuria, frequency, or hematuria  NEUROLOGICAL: no focal weakness or dizziness  MUSCULOSKELETAL: no myalgias     Vital Signs Last 24 Hrs  T(C): 36.4 (27 Apr 2024 11:37), Max: 36.8 (26 Apr 2024 20:17)  T(F): 97.6 (27 Apr 2024 11:37), Max: 98.3 (26 Apr 2024 20:17)  HR: 98 (27 Apr 2024 11:37) (75 - 98)  BP: 180/83 (27 Apr 2024 11:37) (124/75 - 180/83)  BP(mean): --  RR: 18 (27 Apr 2024 11:37) (18 - 18)  SpO2: 97% (27 Apr 2024 11:37) (94% - 97%)    Parameters below as of 27 Apr 2024 11:37  Patient On (Oxygen Delivery Method): room air        PHYSICAL EXAM:  GENERAL: NAD  HEENT:  anicteric, moist mucous membranes  CHEST/LUNG:  CTA b/l, no rales, wheezes, or rhonchi  HEART:  RRR, S1, S2  ABDOMEN:  BS+, soft, nontender, nondistended  EXTREMITIES: no edema, cyanosis, or calf tenderness  NERVOUS SYSTEM: awake and alert, answers questions and follows commands appropriately    LABS:                        13.6   5.90  )-----------( 178      ( 27 Apr 2024 10:38 )             43.1     CBC Full  -  ( 27 Apr 2024 10:38 )  WBC Count : 5.90 K/uL  Hemoglobin : 13.6 g/dL  Hematocrit : 43.1 %  Platelet Count - Automated : 178 K/uL  Mean Cell Volume : 85.9 fl  Mean Cell Hemoglobin : 27.1 pg  Mean Cell Hemoglobin Concentration : 31.6 gm/dL  Auto Neutrophil # : 3.93 K/uL  Auto Lymphocyte # : 1.04 K/uL  Auto Monocyte # : 0.82 K/uL  Auto Eosinophil # : 0.05 K/uL  Auto Basophil # : 0.03 K/uL  Auto Neutrophil % : 66.7 %  Auto Lymphocyte % : 17.6 %  Auto Monocyte % : 13.9 %  Auto Eosinophil % : 0.8 %  Auto Basophil % : 0.5 %    27 Apr 2024 10:38    140    |  107    |  18     ----------------------------<  267    3.9     |  28     |  1.40     Ca    8.6        27 Apr 2024 10:38  Phos  2.4       27 Apr 2024 07:25  Mg     2.0       27 Apr 2024 07:25    TPro  6.1    /  Alb  2.9    /  TBili  0.5    /  DBili  x      /  AST  23     /  ALT  31     /  AlkPhos  104    27 Apr 2024 10:38    PT/INR - ( 27 Apr 2024 10:38 )   PT: 13.3 sec;   INR: 1.14 ratio         PTT - ( 27 Apr 2024 10:38 )  PTT:31.0 sec  Urinalysis Basic - ( 27 Apr 2024 10:38 )    Color: x / Appearance: x / SG: x / pH: x  Gluc: 267 mg/dL / Ketone: x  / Bili: x / Urobili: x   Blood: x / Protein: x / Nitrite: x   Leuk Esterase: x / RBC: x / WBC x   Sq Epi: x / Non Sq Epi: x / Bacteria: x      CAPILLARY BLOOD GLUCOSE      POCT Blood Glucose.: 236 mg/dL (27 Apr 2024 09:53)  POCT Blood Glucose.: 145 mg/dL (27 Apr 2024 07:48)  POCT Blood Glucose.: 160 mg/dL (26 Apr 2024 21:16)  POCT Blood Glucose.: 164 mg/dL (26 Apr 2024 16:53)  POCT Blood Glucose.: 187 mg/dL (26 Apr 2024 12:13)          RADIOLOGY & ADDITIONAL TESTS:    Personally reviewed.     Consultant(s) Notes Reviewed:  [x] YES  [ ] NO     Patient is a 83y old  Male who presents with a chief complaint of Atrial fibrillation  per H&P:  History of Present Illness:  Reason for Admission: AFib w/ RVR  History of Present Illness:   83yoM PMHx AFib on Eliquis, CAD, HTN, DM, HLD, COPD, osteomyelitis presents c/o shortness of breath, chest discomfort. Pt reports onset of rapid heart rate this morning, HR measured 160s when he checked his pulse ox. Also endorses dyspnea exacerbated by movement. Pt reports last episode of AFib in 2020, no episodes since then until today's presentation. Pt states that chest discomfort feels like chest pressure, no chest pain. Pt also reports chronic R big toe wound for the past few months, follows w/ Wound Care. States that he has increased sensitivity to R sole of foot, but denies pain, numbness/tingling. Denies fevers, chills, abdominal pain, N/V/D/C.        (27 Apr 2024 10:52)      INTERVAL HPI/OVERNIGHT EVENTS: Patient seen and examined at bedside. No overnight events occurred. Patient has no complaints at this time, interactive and aware of plan to go home today. Will be picked up by his wife.  Denies chest pain, dyspnea, palpitations, pain    ADDENDUM: Patient seen at 9:39 for RN reporting aphasia. Patient's wife at bedside reported patient not at his normal baseline mentation and unable to speak clearly. CODE STROKE called at 9:41 for asphasia. Patient also found to have RUE weakness and R sided facial droop. EKG without acute ischemic change, NSR. CT scan demonstrating left M3 occlusion. See Code Stroke note for further details.       MEDICATIONS  (STANDING):  apixaban 5 milliGRAM(s) Oral two times a day  aspirin enteric coated 81 milliGRAM(s) Oral daily  atorvastatin 80 milliGRAM(s) Oral at bedtime  ciprofloxacin     Tablet 500 milliGRAM(s) Oral every 12 hours  dextrose 10% Bolus 125 milliLiter(s) IV Bolus once  dextrose 5%. 1000 milliLiter(s) (100 mL/Hr) IV Continuous <Continuous>  dextrose 5%. 1000 milliLiter(s) (50 mL/Hr) IV Continuous <Continuous>  dextrose 50% Injectable 25 Gram(s) IV Push once  dextrose 50% Injectable 12.5 Gram(s) IV Push once  glucagon  Injectable 1 milliGRAM(s) IntraMuscular once  insulin glargine Injectable (LANTUS) 6 Unit(s) SubCutaneous at bedtime  insulin lispro (ADMELOG) corrective regimen sliding scale   SubCutaneous three times a day before meals  insulin lispro (ADMELOG) corrective regimen sliding scale   SubCutaneous at bedtime  lactated ringers. 1000 milliLiter(s) (60 mL/Hr) IV Continuous <Continuous>  metoprolol tartrate 50 milliGRAM(s) Oral every 8 hours  montelukast 10 milliGRAM(s) Oral daily  sodium chloride 0.9%. 1000 milliLiter(s) (75 mL/Hr) IV Continuous <Continuous>  tamsulosin 0.4 milliGRAM(s) Oral at bedtime    MEDICATIONS  (PRN):  acetaminophen     Tablet .. 650 milliGRAM(s) Oral every 6 hours PRN Temp greater or equal to 38C (100.4F), Mild Pain (1 - 3)  albuterol    0.083% 2.5 milliGRAM(s) Nebulizer every 6 hours PRN Bronchospasm  dextrose Oral Gel 15 Gram(s) Oral once PRN Blood Glucose LESS THAN 70 milliGRAM(s)/deciliter      Allergies    No Known Allergies    Intolerances        REVIEW OF SYSTEMS:  CONSTITUTIONAL: No fever or chills  HEENT:  No headache, no sore throat  RESPIRATORY: No cough, wheezing, or shortness of breath  CARDIOVASCULAR: No chest pain, palpitations  GASTROINTESTINAL: No abd pain, nausea, vomiting, or diarrhea  GENITOURINARY: No dysuria, frequency, or hematuria  NEUROLOGICAL: no focal weakness or dizziness  MUSCULOSKELETAL: no myalgias     Vital Signs Last 24 Hrs  T(C): 36.4 (27 Apr 2024 11:37), Max: 36.8 (26 Apr 2024 20:17)  T(F): 97.6 (27 Apr 2024 11:37), Max: 98.3 (26 Apr 2024 20:17)  HR: 98 (27 Apr 2024 11:37) (75 - 98)  BP: 180/83 (27 Apr 2024 11:37) (124/75 - 180/83)  BP(mean): --  RR: 18 (27 Apr 2024 11:37) (18 - 18)  SpO2: 97% (27 Apr 2024 11:37) (94% - 97%)    Parameters below as of 27 Apr 2024 11:37  Patient On (Oxygen Delivery Method): room air        PHYSICAL EXAM:  GENERAL: NAD  HEENT:  anicteric, moist mucous membranes  CHEST/LUNG:  CTA b/l, no rales, wheezes, or rhonchi  HEART:  RRR, S1, S2  ABDOMEN:  BS+, soft, nontender, nondistended  EXTREMITIES: no edema, cyanosis, or calf tenderness  NERVOUS SYSTEM: awake and alert, answers questions and follows commands appropriately    LABS:                        13.6   5.90  )-----------( 178      ( 27 Apr 2024 10:38 )             43.1     CBC Full  -  ( 27 Apr 2024 10:38 )  WBC Count : 5.90 K/uL  Hemoglobin : 13.6 g/dL  Hematocrit : 43.1 %  Platelet Count - Automated : 178 K/uL  Mean Cell Volume : 85.9 fl  Mean Cell Hemoglobin : 27.1 pg  Mean Cell Hemoglobin Concentration : 31.6 gm/dL  Auto Neutrophil # : 3.93 K/uL  Auto Lymphocyte # : 1.04 K/uL  Auto Monocyte # : 0.82 K/uL  Auto Eosinophil # : 0.05 K/uL  Auto Basophil # : 0.03 K/uL  Auto Neutrophil % : 66.7 %  Auto Lymphocyte % : 17.6 %  Auto Monocyte % : 13.9 %  Auto Eosinophil % : 0.8 %  Auto Basophil % : 0.5 %    27 Apr 2024 10:38    140    |  107    |  18     ----------------------------<  267    3.9     |  28     |  1.40     Ca    8.6        27 Apr 2024 10:38  Phos  2.4       27 Apr 2024 07:25  Mg     2.0       27 Apr 2024 07:25    TPro  6.1    /  Alb  2.9    /  TBili  0.5    /  DBili  x      /  AST  23     /  ALT  31     /  AlkPhos  104    27 Apr 2024 10:38    PT/INR - ( 27 Apr 2024 10:38 )   PT: 13.3 sec;   INR: 1.14 ratio         PTT - ( 27 Apr 2024 10:38 )  PTT:31.0 sec  Urinalysis Basic - ( 27 Apr 2024 10:38 )    Color: x / Appearance: x / SG: x / pH: x  Gluc: 267 mg/dL / Ketone: x  / Bili: x / Urobili: x   Blood: x / Protein: x / Nitrite: x   Leuk Esterase: x / RBC: x / WBC x   Sq Epi: x / Non Sq Epi: x / Bacteria: x      CAPILLARY BLOOD GLUCOSE      POCT Blood Glucose.: 236 mg/dL (27 Apr 2024 09:53)  POCT Blood Glucose.: 145 mg/dL (27 Apr 2024 07:48)  POCT Blood Glucose.: 160 mg/dL (26 Apr 2024 21:16)  POCT Blood Glucose.: 164 mg/dL (26 Apr 2024 16:53)  POCT Blood Glucose.: 187 mg/dL (26 Apr 2024 12:13)          RADIOLOGY & ADDITIONAL TESTS:    Personally reviewed.     Consultant(s) Notes Reviewed:  [x] YES  [ ] NO

## 2024-04-27 NOTE — DIETITIAN INITIAL EVALUATION ADULT - PROBLEM SELECTOR PLAN 3
- R big toe dressing c/d/i  - on home ciprofloxacin for osteomyelitis  - start IV vanco  - f/u MRSA/MSSA PCR  - Wound Care consulted  - ID consult

## 2024-04-27 NOTE — CONSULT NOTE ADULT - SUBJECTIVE AND OBJECTIVE BOX
Patient is a 83y old  Male who presents with a chief complaint of Atrial fibrillation (27 Apr 2024 10:52)    BRIEF HOSPITAL COURSE: 83 year old male with a PMH of eosinophilic asthma/COPD, former smoker, chronic hypoxemic and hypercapnic respiratory failure on nocturnal BiPAP, afib on Eliquis, CAD s/p CABG and PCI, HTN, HLD, DM2, L femur fracture with chronic OM who presented to the ED from home with complaints of SOB and tachycardia.  On arrival to the ED he was noted to be in afib RVR.  He was initiated on a cardizem gtt and admitted to telemetry.  He converted to NSR and was initiated on PO metoprolol.  This morning patient was noted to have new onset aphasia and a code stroke was called.  He was deemed not a candidate for TNK as he is on AC.  He was found to have a L M3 occlusion but was deemed not a candidate for thrombectomy as occlusion too distal.  Patient was a RRT tonight due to worsening NIH score noted by nursing.  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  ICU was consulted for vasopressor needs to maintain SBP >160 to optimize perfusion.     ROS: Unable to obtain     PAST MEDICAL & SURGICAL HISTORY:  Diabetes Mellitus, Type II  CAD (Coronary Artery Disease)  s/p 3v CABG 2004; stents placed in [place] in 2019  Dyslipidemia  Osteomyelitis  COPD (chronic obstructive pulmonary disease)  on 2L at home and BiPAP at night; intubated 6/18  Hypertension  PVD (peripheral vascular disease)  History of PAT (paroxysmal atrial tachycardia)  Asthma with COPD  BPH (benign prostatic hyperplasia)  Acute osteomyelitis  CABG (Coronary Artery Bypass Graft)  2004  Compound fracture  left leg  S/P primary angioplasty with coronary stent  H/O drainage of abscess  Left femur 12/2021      Medications:  ciprofloxacin   IVPB 400 milliGRAM(s) IV Intermittent every 12 hours  aMIOdarone Infusion 1 mG/Min IV Continuous <Continuous>  aMIOdarone Infusion 0.5 mG/Min IV Continuous <Continuous>  albuterol    0.083% 2.5 milliGRAM(s) Nebulizer every 6 hours PRN  acetaminophen     Tablet .. 650 milliGRAM(s) Oral every 6 hours PRN  enoxaparin Injectable 100 milliGRAM(s) SubCutaneous every 12 hours  atorvastatin 80 milliGRAM(s) Oral at bedtime  dextrose 50% Injectable 12.5 Gram(s) IV Push once  dextrose 50% Injectable 25 Gram(s) IV Push once  dextrose Oral Gel 15 Gram(s) Oral once PRN  glucagon  Injectable 1 milliGRAM(s) IntraMuscular once  insulin lispro (ADMELOG) corrective regimen sliding scale   SubCutaneous at bedtime  insulin lispro (ADMELOG) corrective regimen sliding scale   SubCutaneous three times a day before meals  dextrose 10% Bolus 125 milliLiter(s) IV Bolus once  dextrose 5%. 1000 milliLiter(s) IV Continuous <Continuous>  dextrose 5%. 1000 milliLiter(s) IV Continuous <Continuous>  lactated ringers Bolus 250 milliLiter(s) IV Bolus once  lactated ringers. 1000 milliLiter(s) IV Continuous <Continuous>        ICU Vital Signs Last 24 Hrs  T(C): 36.7 (27 Apr 2024 21:50), Max: 36.8 (27 Apr 2024 19:55)  T(F): 98.1 (27 Apr 2024 21:50), Max: 98.3 (27 Apr 2024 19:55)  HR: 98 (27 Apr 2024 21:50) (78 - 151)  BP: 169/97 (27 Apr 2024 21:50) (128/87 - 180/83)  BP(mean): --  ABP: --  ABP(mean): --  RR: 18 (27 Apr 2024 21:50) (15 - 18)  SpO2: 94% (27 Apr 2024 21:50) (94% - 99%)    O2 Parameters below as of 27 Apr 2024 21:50  Patient On (Oxygen Delivery Method): room air        ABG - ( 27 Apr 2024 11:10 )  pH, Arterial: 7.40  pH, Blood: x     /  pCO2: 49    /  pO2: 69    / HCO3: 30    / Base Excess: 5.6   /  SaO2: 94.5            I&O's Detail    26 Apr 2024 07:01  -  27 Apr 2024 07:00  --------------------------------------------------------  IN:  Total IN: 0 mL    OUT:    Voided (mL): 875 mL  Total OUT: 875 mL    Total NET: -875 mL      27 Apr 2024 07:01 - 27 Apr 2024 22:15  --------------------------------------------------------  IN:  Total IN: 0 mL    OUT:    Voided (mL): 700 mL  Total OUT: 700 mL    Total NET: -700 mL            LABS:                        13.6   5.90  )-----------( 178      ( 27 Apr 2024 10:38 )             43.1     04-27    142  |  105  |  15  ----------------------------<  174<H>  4.1   |  30  |  1.40<H>    Ca    8.8      27 Apr 2024 14:47  Phos  2.4     04-27  Mg     2.0     04-27    TPro  6.1  /  Alb  2.9<L>  /  TBili  0.5  /  DBili  x   /  AST  23  /  ALT  31  /  AlkPhos  104  04-27      CARDIAC MARKERS ( 27 Apr 2024 10:38 )  x     / x     / 63 U/L / x     / 3.9 ng/mL      CAPILLARY BLOOD GLUCOSE      POCT Blood Glucose.: 164 mg/dL (27 Apr 2024 22:06)    PT/INR - ( 27 Apr 2024 10:38 )   PT: 13.3 sec;   INR: 1.14 ratio         PTT - ( 27 Apr 2024 10:38 )  PTT:31.0 sec  Urinalysis Basic - ( 27 Apr 2024 14:47 )    Color: x / Appearance: x / SG: x / pH: x  Gluc: 174 mg/dL / Ketone: x  / Bili: x / Urobili: x   Blood: x / Protein: x / Nitrite: x   Leuk Esterase: x / RBC: x / WBC x   Sq Epi: x / Non Sq Epi: x / Bacteria: x      CULTURES:      Physical Examination:    General: Lethargic but alert, minimally responsive to questions     PULM: Clear to auscultation bilaterally    CVS: Regular rate and rhythm    ABD: Soft, nondistended, nontender    EXT: No LE edema b/l    SKIN: Warm and well perfused    NEURO: R hemiplegia       < from: CT Head No Cont (04.27.24 @ 19:52) >  IMPRESSION:  Moderate-sized evolving acute/subacute infarct in the left middle   cerebral artery vascular territory, centered in the ventral left parietal   convexity, with extension into the posterior aspect of the left insula.    Numerous additional punctate acute/subacute infarcts scattered throughout   the cerebral hemispheres and cerebellar hemispheres bilaterally on MRI of   04/27/2024 are not well visualized by CT technique.    No hemorrhagic conversion.    < end of copied text >

## 2024-04-27 NOTE — CONSULT NOTE ADULT - ASSESSMENT
83 year old male with a PMH of eosinophilic asthma/COPD, former smoker, chronic hypoxemic and hypercapnic respiratory failure on nocturnal BiPAP, afib on Eliquis, CAD s/p CABG and PCI, HTN, HLD, DM2, L femur fracture with chronic OM who presented to the ED from home with complaints of SOB and tachycardia.    Problem list:   Acute CVA  Afib  MERRILL  Chronic hypoxemic/hypercapnic respiratory failure     Neuro: MRI from earlier confirmed multiple acute infarcts throughout the bilateral cerebral and cerebellar hemispheres likely embolic.  Also with a more dominant acute to subacute infarct in the posterior L MCA distribution.  No hemorrhagic conversion on CT brain performed tonight.  ASA/statin.  Neuro consult.   CV: Now on amio gtt since earlier today for afib control.  Currently NSR.  C/w amio gtt.  Will initiate neosynephrine (to avoid beta agonism in afib RVR) to maintain SBP >160 as per neuro recs.  Echo w/ normal biventricular function.   Pulm: Continue with nasal cannula O2 and nocturnal BiPAP.    GI: Remains NPO.  Failed bedside dysphagia screen.  Wife declined NG tube earlier.  Will need formal speech and swallow eval.  Aspiration precautions.   Renal: IVF hydration.  Monitor renal function and UOP.  Replete electrolytes prn.   Heme: Full AC with Lovenox.   ID: On cipro for chronic suppression of chronic osteomyelitis.   Endocrine: q6 hr ISS while NPO. TFTs WNL.     Dispo: Admit to ICU.  GOC discussed w/ patient's wife at bedside.  To remain full code for now although as per chart notes she declined NG tube placement earlier today.  Will need ongoing conversations.   Case discussed w/ E-ICU attending Dr. Leung.     55 minutes of critical care time spent assessing presenting problems of acute illness, which pose high probability of life threatening deterioration or end organ damage/dysfunction, as well as medical decision making including initiating plan of care, reviewing data, reviewing radiologic exams, discussing with multidisciplinary team,  discussing goals of care with patient/family, and writing this note.  Non-inclusive of procedures performed.  Date of entry of this note is equal to the date of services rendered.

## 2024-04-27 NOTE — PROGRESS NOTE ADULT - ATTENDING COMMENTS
Patient was seen and examined by myself. Case was discussed with house staff in details. I have reviewed and agree with the plan as outlined above with edits where appropriate.    HPI:  83yoM PMHx AFib on Eliquis, CAD, HTN, DM, HLD, COPD, osteomyelitis presents c/o shortness of breath, chest discomfort. Pt reports onset of rapid heart rate this morning, HR measured 160s when he checked his pulse ox. Also endorses dyspnea exacerbated by movement. Pt reports last episode of AFib in 2020, no episodes since then until today's presentation. Pt states that chest discomfort feels like chest pressure, no chest pain. Pt also reports chronic R big toe wound for the past few months, follows w/ [facility]ound [facility]. States that he has increased sensitivity to R sole of foot, but denies pain, numbness/tingling. Denies fevers, chills, abdominal pain, N/V/D/C.     IN THE ED:  Temp 98  F ,  , /81  ,RR 18 , SpO2 94%  S/P PO , IV diltiazem 10mg x2, IV diltiazem gtt @ 10 mg/hr  Labs significant for BUN/Cr 25/1.6, troponin 507.9, pBNP 355  Imaging:   CXR wet read: no gross abnormalities (25 Apr 2024 12:13)      ROS: as in the HPI; all other ROS negative    SH and family history as above        GEN: appears weak   HEENT- normocephalic; mouth moist  CVS- S1S2+  LUNGS- clear to auscultation; occasional wheezing  ABD: Soft , nontender, nondistended, Bowel sounds are present  EXTREMITY: no calf tenderness, no cyanosis, no edema  NEURO: AAox1; right facial drop , slurred speech, RUE weakness, Motor RUE 3/5 LUE 5/5, B/L LE 4/5.   PSYCH: + AMS   BACK: no swelling or mass;   VASCULAR: distal peripheral pulses present  SKIN: warm and dry.       Labs and imaging reviewed      Patient presenting with Patient is a 83y old  Male who presents with a chief complaint of AFib w/ RVR (26 Apr 2024 13:13)   admitted for   1. Afib with RVR, off cardizem drip, on metoprolol 50mg q8h, rate controlled at current , AC eliquis increased to 5mg bid with renal function improved, adjust dose if renal function worse.   TTE:   1. Technically difficult image quality.   2. The LV is not well visualized, however the LV function is probably normal based on limited views.   3. The right ventricle is not well visualized. probably normal systolic function.   4. There is moderate thickening of the aortic valve leaflets.   5. Structurally normal mitral valve with normal leaflet excursion.   6. Trace tricuspid regurgitation.   7. The inferior vena cava is dilated measuring 2.18 cm in diameter, (dilated >2.1cm) with normal inspiratory collapse (normal >50%) consistent with mildly elevated right atrial pressure (~8, range 5-10mmHg).   8. Estimated pulmonary artery systolic pressure is 26 mmHg, consistent with normal pulmonary artery pressure.  cardio eval noted   EKG this AM SR with PVC, no ischemic changes     2. Acute CVA: change of Mental status and speech, focal weakness noted this AM , stroke code called, CTH CTA: Left M3 branch occlusion. on AC/ASA, HIGH DOSE Statin, permissive hypertension, discussed with neuro, MR head.   PT/OT S/S eval , NPO for now     3. raspatory acidosis: due to CO2 retention, bipap and oxygen supply.   4. Right 1st toe ulcer, Left femoral intramedullary abscess: ID eval noted c/w cipro   5. DM: insulin lantus with RISS , FS goal 100-180  6. chronic RF with hypoxia and hypercapnea on home O2, BIPAP QHS, albuteral neb prn , ABG reviewed. c/w singulair       Plan of care discussed with wife at bedside;  all questions and concerns were addressed.

## 2024-04-27 NOTE — CHART NOTE - NSCHARTNOTEFT_GEN_A_CORE
Rapid response was called at 1910 for change in NIHSS from 16 to 23.    Events leading up to Rapid Response:  Patient was seen and examined at the bedside by the rapid response team and ICU PA at bedside.  Last well known 7:00pm. Pt appearing more lethargic, less responsive to questions. Concern for change in mental status 2/2 hypoperfusion. Pt was code stroke earlier this morning, not considered candidate for TNK. Pending MRI.       Rapid Response Vital Signs:    BP: 138/76  HR:102  RR: 20  SpO2: 97% on RA  Temp:98.9  FS: 194      Physical Exam:  Gen: well appearing, NAD  HEENT: NCAT, PEERLA b/l, EOMI b/l  Cardio: regular rate and rhythm, +s1s2, no murmurs, rubs, or gallops  Pulm: CTA b/l, no wheezes, rales or rhonchi  Abdomen: soft, nontender, nondistended, +BS x4 quadrants, no guarding  Extremities: no cyanosis or edema, +2 pedal pulses  Neuro: awake, alert, + dysarthria, + L sided facial droop, strength 0/5 RUE 4/5 LUE 3/5 RLE 4/5 LLE, sensation intact b/l UE and LE   Skin: warm and dry      Assessment/Plan:   83 year old admitted for afib RVR. Rapid response called for change in mental status, NIHSS 16 -> 23.     - lethargy suspected 2/2 hypoperfusion.  - s/p 250cc bolus   - Urgent CTH non con ordered    -Discussed with Dr. Underwood, agrees with above plan  -Family updated by   -RN to call if any changes      Addendum:     RAPID RESPONSE FOLLOW UP NOTE:    ASSESSMENT/ PLAN:        - RN to call with any changes.     DISPOSITION:  - Maintain current level of care. Rapid response was called at 1910 for change in NIHSS from 16 to 23.    Events leading up to Rapid Response:  Patient was seen and examined at the bedside by the rapid response team and ICU PA at bedside. Last well known 7:00pm. Pt appearing more lethargic, less responsive to questions. Concern for change in mental status 2/2 hypoperfusion. Pt was code stroke earlier this morning, not considered candidate for TNK. Pending MRI.       Rapid Response Vital Signs:    BP: 138/76  HR:102  RR: 20  SpO2: 97% on RA  Temp: 98.9  FS: 194      Physical Exam:  Gen: well appearing, NAD  HEENT: NCAT, PEERLA b/l, EOMI b/l  Cardio: regular rate and rhythm, +s1s2, no murmurs, rubs, or gallops  Pulm: CTA b/l, no wheezes, rales or rhonchi  Abdomen: soft, nontender, nondistended, +BS x4 quadrants, no guarding  Extremities: no cyanosis or edema, +2 pedal pulses  Neuro: awake, alert, + dysarthria, + L sided facial droop, strength 0/5 RUE 4/5 LUE 3/5 RLE 4/5 LLE, sensation intact b/l UE and LE   Skin: warm and dry      Assessment/Plan:   83 year old admitted for afib RVR. Rapid response called for change in mental status, NIHSS 16 -> 23.     #Lethargy suspected 2/2 hypoperfusion.  - initial /76, goal BP >140/80 for permissive HTN  - s/p 250cc bolus, with improvement of BP to 185/104   - on full dose Lovenox for anticoagulation   - Urgent CTH non con ordered   - Low threshold for ICU consult if BP continues to decrease, for possible need for pressors     -Discussed with Dr. Underwood, Dr. Sky, agrees with above plan  -Family at bedside, updated by Dr. Cortez.   -RN to call if any changes Rapid response was called at 1910 for change in NIHSS from 16 to 23.    Events leading up to Rapid Response:  Patient was seen and examined at the bedside by the rapid response team and ICU PA at bedside. Last well known 7:00pm. Pt appearing more lethargic, less responsive to questions. Concern for change in mental status 2/2 brain hypoperfusion. Pt was code stroke earlier this morning, not considered candidate for TNK. Pending MRI.       Rapid Response Vital Signs:    BP: 138/76  HR:102  RR: 20  SpO2: 97% on RA  Temp: 98.9  FS: 194      Physical Exam:  Gen: well appearing, NAD  HEENT: NCAT, PEERLA b/l, EOMI b/l  Cardio: regular rate and rhythm, +s1s2, no murmurs, rubs, or gallops  Pulm: CTA b/l, no wheezes, rales or rhonchi  Abdomen: soft, nontender, nondistended, +BS x4 quadrants, no guarding  Extremities: no cyanosis or edema, +2 pedal pulses  Neuro: awake, alert, + dysarthria, + R sided facial droop, strength 0/5 RUE 4/5 LUE 3/5 RLE 4/5 LLE, sensation intact b/l UE and LE   Skin: warm and dry      Assessment/Plan:   83yoM PMHx AFib on Eliquis, CAD, HTN, DM, HLD, COPD, osteomyelitis presents c/o shortness of breath, chest discomfort. Admitted for AFib w/ RVR. Hospital course c/b CVA  today. Found to have a left M3 occlusion on CT scan this AM. Rapid response called for change in mental status, NIHSS 16 -> 23.     #Lethargy suspected 2/2 brain hypoperfusion.  - initial /76, goal BP >140/80 for permissive HTN  - s/p 250cc bolus, with improvement of BP to 185/104 and mental status  - on full dose Lovenox for anticoagulation, not a candidate for TNK  - Urgent CTH non con ordered, to confirm no conversion to hemorrhagic stroke, f/u results  - Low threshold for ICU consult if BP continues to decrease, for possible need for pressors     -Discussed with Dr. Underwood, Dr. Sky, agrees with above plan  -Family at bedside, updated by Dr. Cortez.   -RN to call if any changes Rapid response was called at 1910 for change in NIHSS from 16 to 23.    Events leading up to Rapid Response:  Patient was seen and examined at the bedside by the rapid response team and ICU PA at bedside. Last well known 7:00pm. Pt appearing more lethargic, less responsive to questions. Concern for change in mental status 2/2 brain hypoperfusion. Pt was code stroke earlier this morning, not considered candidate for TNK.       Rapid Response Vital Signs:    BP: 138/76  HR:102  RR: 20  SpO2: 97% on RA  Temp: 98.9  FS: 194      Physical Exam:  Gen: well appearing, NAD  HEENT: NCAT, PEERLA b/l, EOMI b/l  Cardio: regular rate and rhythm, +s1s2, no murmurs, rubs, or gallops  Pulm: CTA b/l, no wheezes, rales or rhonchi  Abdomen: soft, nontender, nondistended, +BS x4 quadrants, no guarding  Extremities: no cyanosis or edema, +2 pedal pulses  Neuro: awake, alert, + dysarthria, + R sided facial droop, strength 0/5 RUE 4/5 LUE 3/5 RLE 4/5 LLE, sensation intact b/l UE and LE   Skin: warm and dry      Assessment/Plan:   83yoM PMHx AFib on Eliquis, CAD, HTN, DM, HLD, COPD, osteomyelitis presents c/o shortness of breath, chest discomfort. Admitted for AFib w/ RVR. Hospital course c/b CVA  today. Found to have a left M3 occlusion on CT scan this AM. Rapid response called for change in mental status, NIHSS 16 -> 23.     #Lethargy suspected 2/2 brain hypoperfusion.  - initial /76, goal BP >140/80 for permissive HTN  - s/p 250cc bolus, with improvement of BP to 185/104 and mental status  - on full dose Lovenox for anticoagulation, not a candidate for TNK  - Urgent CTH non con ordered, to confirm no conversion to hemorrhagic stroke, f/u results  - Low threshold for ICU consult if BP continues to decrease, for possible need for pressors     -Discussed with Dr. Underwood, Dr. Sky, agrees with above plan  -Family at bedside, updated by Dr. Cortez.   -RN to call if any changes Rapid response was called at 1910 for change in NIHSS from 16 to 23.    Events leading up to Rapid Response:  Patient was seen and examined at the bedside by the rapid response team and ICU PA at bedside. Last well known 7:00pm. Pt appearing more lethargic, less responsive to questions. Concern for change in mental status 2/2 brain hypoperfusion. Pt was code stroke earlier this morning, not considered candidate for TNK.       Rapid Response Vital Signs:    BP: 138/76  HR:102  RR: 20  SpO2: 97% on RA  Temp: 98.9  FS: 194      Physical Exam:  Gen: well appearing, NAD  HEENT: NCAT, PEERLA b/l, EOMI b/l  Cardio: regular rate and rhythm, +s1s2, no murmurs, rubs, or gallops  Pulm: CTA b/l, no wheezes, rales or rhonchi  Abdomen: soft, nontender, nondistended, +BS x4 quadrants, no guarding  Extremities: no cyanosis or edema, +2 pedal pulses  Neuro: awake, alert, + dysarthria, + R sided facial droop, strength 0/5 RUE 4/5 LUE 3/5 RLE 4/5 LLE, sensation intact b/l UE and LE   Skin: warm and dry      Assessment/Plan:   83yoM PMHx AFib on Eliquis, CAD, HTN, DM, HLD, COPD, osteomyelitis presents c/o shortness of breath, chest discomfort. Admitted for AFib w/ RVR. Hospital course c/b CVA  today. Found to have a left M3 occlusion on CT scan this AM. Rapid response called for change in mental status, NIHSS 16 -> 23.     #Lethargy suspected 2/2 brain hypoperfusion with known recent CVA  - initial /76, goal BP >140/80 for permissive HTN  - s/p 250cc bolus, with improvement of BP to 185/104 and mental status  - on full dose Lovenox for anticoagulation, not a candidate for TNK  - continue asa and statin  - Urgent CTH non con ordered, to confirm no conversion to hemorrhagic stroke, f/u results  - Low threshold for ICU consult if BP continues to decrease, for possible need for pressors   -Discussed with Dr. Underwood, Dr. Sky, agrees with above plan  -Family at bedside, updated by Dr. Cortez.   -RN to call if any changes    #afib with RVR:  - HR well controlled currently in low 100s  - continue amio gtt  - RN to call if changes Rapid response was called at 1910 for change in NIHSS from 16 to 23.    Events leading up to Rapid Response:  Patient was seen and examined at the bedside by the rapid response team and ICU PA at bedside. Last well known 7:00pm. Pt appearing more lethargic, less responsive to questions. Concern for change in mental status 2/2 brain hypoperfusion. Pt was code stroke earlier this morning, not considered candidate for TNK.       Rapid Response Vital Signs:    BP: 138/76  HR:102  RR: 20  SpO2: 97% on RA  Temp: 98.9  FS: 194      Physical Exam:  Gen: well appearing, NAD  HEENT: NCAT, PEERLA b/l, EOMI b/l  Cardio: regular rate and rhythm, +s1s2, no murmurs, rubs, or gallops  Pulm: CTA b/l, no wheezes, rales or rhonchi  Abdomen: soft, nontender, nondistended, +BS x4 quadrants, no guarding  Extremities: no cyanosis or edema, +2 pedal pulses  Neuro: awake, alert, + dysarthria, + R sided facial droop, strength 0/5 RUE 4/5 LUE 3/5 RLE 4/5 LLE, sensation intact b/l UE and LE   Skin: warm and dry      Assessment/Plan:   83yoM PMHx AFib on Eliquis, CAD, HTN, DM, HLD, COPD, osteomyelitis presents c/o shortness of breath, chest discomfort. Admitted for AFib w/ RVR. Hospital course c/b CVA  today. Found to have a left M3 occlusion on CT scan this AM. Rapid response called for change in mental status, NIHSS 16 -> 23.     #Lethargy suspected 2/2 brain hypoperfusion with known recent CVA  - MR head: Multiple acute infarcts scattered throughout the bilateral cerebral and cerebellar hemispheres. Given multiple vascular distributions involved, an embolic source should be considered. More dominant acute to subacute infarction in the posterior left MCA vascular distribution. Chronic small vessel disease.  - initial /76, goal BP >140/80 for permissive HTN  - s/p 250cc bolus, with improvement of BP to 185/104 and mental status  - on full dose Lovenox for anticoagulation, not a candidate for TNK  - continue asa and statin  - Urgent CTH non con ordered, to confirm no conversion to hemorrhagic stroke, f/u results  - Low threshold for ICU consult if BP continues to decrease, for possible need for pressors   -Discussed with Dr. Underwood, Dr. Sky, agrees with above plan  -Family at bedside, updated by Dr. Cortez.   -RN to call if any changes    #afib with RVR:  - HR well controlled currently in low 100s  - continue amio gtt  - RN to call if changes Rapid response was called at 1910 for change in NIHSS from 16 to 23.    Events leading up to Rapid Response:  Patient was seen and examined at the bedside by the rapid response team and ICU PA at bedside. Last well known 7:00pm. Pt appearing more lethargic, less responsive to questions. Concern for change in mental status 2/2 brain hypoperfusion. Pt was code stroke earlier this morning, not considered candidate for TNK.       Rapid Response Vital Signs:    BP: 138/76  HR:102  RR: 20  SpO2: 97% on RA  Temp: 98.9  FS: 194      Physical Exam:  Gen: well appearing, NAD  HEENT: NCAT, PEERLA b/l, EOMI b/l  Cardio: regular rate and rhythm, +s1s2, no murmurs, rubs, or gallops  Pulm: CTA b/l, no wheezes, rales or rhonchi  Abdomen: soft, nontender, nondistended, +BS x4 quadrants, no guarding  Extremities: no cyanosis or edema, +2 pedal pulses  Neuro: awake, alert, + dysarthria, + R sided facial droop, strength 0/5 RUE 4/5 LUE 3/5 RLE 4/5 LLE, sensation intact b/l UE and LE   Skin: warm and dry      Assessment/Plan:   83yoM PMHx AFib on Eliquis, CAD, HTN, DM, HLD, COPD, osteomyelitis presents c/o shortness of breath, chest discomfort. Admitted for AFib w/ RVR. Hospital course c/b CVA  today. Found to have a left M3 occlusion on CT scan this AM. Rapid response called for change in mental status, NIHSS 16 -> 23.     #Lethargy suspected 2/2 brain hypoperfusion with known recent CVA  - MR head: Multiple acute infarcts scattered throughout the bilateral cerebral and cerebellar hemispheres. Given multiple vascular distributions involved, an embolic source should be considered. More dominant acute to subacute infarction in the posterior left MCA vascular distribution. Chronic small vessel disease.  - initial /76, goal BP >140/80 for permissive HTN  - s/p 250cc bolus, with improvement of BP to 185/104 and mental status  - on full dose Lovenox for anticoagulation, not a candidate for TNK  - continue asa and statin  - Urgent CTH non con ordered, to confirm no conversion to hemorrhagic stroke, f/u results  - Low threshold for ICU consult if BP continues to decrease, for possible need for pressors   -Discussed with Dr. Underwood, Dr. Sky, agrees with above plan  -Family at bedside, updated by Dr. Cortez.   -RN to call if any changes    #afib with RVR:  - HR well controlled currently in low 100s  - continue amio gtt  - RN to call if changes    ---------------------------------------------------------------------------  ADDENDUM, RAPID RESPONSE FOLLOW UP     Pt re-evaluated at bedside. Pt continues to have dysarthria, but speech slightly more comprehensible. Pt denies any complaints. CT Head results discussed with pt's wife at bedside.    - CTH: Moderate-sized evolving acute/subacute infarct in the left middle cerebral artery vascular territory, centered in the ventral left parietal convexity, with extension into the posterior aspect of the left insula. Numerous additional punctate acute/subacute infarcts scattered throughout the cerebral hemispheres and cerebellar hemispheres bilaterally on MRI of 04/27/2024 are not well visualized by CT technique. No hemorrhagic conversion.  - Repeat /83; per Neuro Dr Underwood, goal SBP should be 160-170  - ICU re-consulted for pressor support in order to maintain SBP within 160-170; will accept pt for transfer to ICU  - continue amio gtt   - rest of plan per ICU

## 2024-04-27 NOTE — PROGRESS NOTE ADULT - PROBLEM SELECTOR PLAN 5
Chronic  - continue home ASA, statin  - f/u lipid panel MERRILL on admission. Baseline Cr around 1.1  - On admission Cr 1.6  - Continue IV NS @ 75cc/hr x 12 hrs  - Avoid nephrotoxics - hold home furosemide  - F/u BMP in AM

## 2024-04-27 NOTE — PROGRESS NOTE ADULT - PROBLEM SELECTOR PLAN 8
Chronic, baseline 2L home O2 and BiPAP qHS  - CXR wet read: no gross abnormalities  - Satting well on RA   - Continue home montelukast  - Continue Bipap overnight   - Pulm Dr. Combs consulted Chronic, on home insulin (Lantus 10u)  - consistent carb diet  - hold home insulin  - start low dose sliding scale  - hypoglycemia protocol in place, fingersticks q ac, q hs   - goal -180 in hospital setting, adjust insulin regimen prn  - f/u HbA1c Chronic, on home insulin (Lantus 10u)  - consistent carb diet -->NPO   - hold home insulin  - start low dose sliding scale  - hypoglycemia protocol in place, fingersticks q ac, q hs   - goal -180 in hospital setting, adjust insulin regimen prn  - f/u HbA1c

## 2024-04-27 NOTE — PROVIDER CONTACT NOTE (EICU) - ASSESSMENT
Tele-evaluation precludes physical exam.  Pertinent physical exam findings as per verbal communication by bedside provider are as following.   GEN: Lethargic but alert, minimally responsive to questions   CV: Sinus rhythm on monitor  PULM: breathing comfortably  EXT: No edema  NEURO: As per bedside RN: Aphasic, L hand  follows commands; unable to move R side upper and lower ext

## 2024-04-27 NOTE — PROGRESS NOTE ADULT - PROBLEM SELECTOR PLAN 6
Chronic  - BP stable  - Continue lopressor 50mg q8h PO  - Monitor routine hemodynamics Chronic  - continue home ASA, statin  - Lipid panel within normal

## 2024-04-27 NOTE — PROGRESS NOTE ADULT - ASSESSMENT
83-year-old M with history of COPD on home O2 PRN, coronary artery disease s/p CABG,  hypertension, diabetes, hyperlipidemia, atrial fibrillation on Eliquis as well as chronic osteomyelitis who presents to the emergency department at [facility] complaining of rapid heart rate. Cardiology consulted for afib with rvr.    Presenting with pAF with RVR  At home he had noted some SOB, his wife checked his HR and noted it up was up to 160s so brought him to the ER, then  placed on Cardizem gtt, had missed home BB   EKGs showed ST, PVCs then AF with RVR and NSST, now back to NSR.   tele overnight SR     Trop peaked  507 now downtrending   Now that he is back in NSR recommended to start Amio yesterday for maintenance but family was hesitant  Continue Lopressor 50mg Q8H change to toprol on dc   echo as above lv not well visualized probably normal , milldy elevated right atrial pressure    Tele monitoring     bp stable      Follows with dr Tucker     Monitor and replete electrolytes. Keep K>4.0 and Mg>2.0.

## 2024-04-27 NOTE — PROGRESS NOTE ADULT - SUBJECTIVE AND OBJECTIVE BOX
Date/Time Patient Seen:  		  Referring MD:   Data Reviewed	       Patient is a 83y old  Male who presents with a chief complaint of AFib w/ RVR (26 Apr 2024 13:13)      Subjective/HPI     PAST MEDICAL & SURGICAL HISTORY:  Diabetes Mellitus, Type II    CAD (Coronary Artery Disease)  s/p 3v CABG 2004; stents placed in [place] in 2019    Dyslipidemia    Asymptomatic Peripheral Vascular Disease    Osteomyelitis    COPD (chronic obstructive pulmonary disease)  on 2L at home and BiPAP at night; intubated 6/18    Diabetes mellitus    Hypertension    PVD (peripheral vascular disease)    History of PAT (paroxysmal atrial tachycardia)    Asthma with COPD    BPH (benign prostatic hyperplasia)    Acute osteomyelitis    CABG (Coronary Artery Bypass Graft)  2004    Compound fracture  left leg    S/P primary angioplasty with coronary stent    H/O drainage of abscess  Left femur 12/2021          Medication list         MEDICATIONS  (STANDING):  apixaban 2.5 milliGRAM(s) Oral every 12 hours  aspirin enteric coated 81 milliGRAM(s) Oral daily  atorvastatin 80 milliGRAM(s) Oral at bedtime  ciprofloxacin     Tablet 500 milliGRAM(s) Oral every 12 hours  dextrose 10% Bolus 125 milliLiter(s) IV Bolus once  dextrose 5%. 1000 milliLiter(s) (100 mL/Hr) IV Continuous <Continuous>  dextrose 5%. 1000 milliLiter(s) (50 mL/Hr) IV Continuous <Continuous>  dextrose 50% Injectable 25 Gram(s) IV Push once  dextrose 50% Injectable 12.5 Gram(s) IV Push once  glucagon  Injectable 1 milliGRAM(s) IntraMuscular once  insulin glargine Injectable (LANTUS) 6 Unit(s) SubCutaneous at bedtime  insulin lispro (ADMELOG) corrective regimen sliding scale   SubCutaneous at bedtime  insulin lispro (ADMELOG) corrective regimen sliding scale   SubCutaneous three times a day before meals  metoprolol tartrate 50 milliGRAM(s) Oral every 8 hours  montelukast 10 milliGRAM(s) Oral daily  sodium chloride 0.9%. 1000 milliLiter(s) (75 mL/Hr) IV Continuous <Continuous>  tamsulosin 0.4 milliGRAM(s) Oral at bedtime    MEDICATIONS  (PRN):  acetaminophen     Tablet .. 650 milliGRAM(s) Oral every 6 hours PRN Temp greater or equal to 38C (100.4F), Mild Pain (1 - 3)  albuterol    90 MICROgram(s) HFA Inhaler 2 Puff(s) Inhalation every 6 hours PRN Shortness of Breath and/or Wheezing  dextrose Oral Gel 15 Gram(s) Oral once PRN Blood Glucose LESS THAN 70 milliGRAM(s)/deciliter         Vitals log        ICU Vital Signs Last 24 Hrs  T(C): 36.4 (27 Apr 2024 05:04), Max: 36.8 (26 Apr 2024 11:50)  T(F): 97.6 (27 Apr 2024 05:04), Max: 98.3 (26 Apr 2024 11:50)  HR: 90 (27 Apr 2024 05:04) (75 - 92)  BP: 132/80 (27 Apr 2024 05:04) (124/75 - 158/69)  BP(mean): --  ABP: --  ABP(mean): --  RR: 18 (27 Apr 2024 05:04) (18 - 18)  SpO2: 94% (27 Apr 2024 05:04) (92% - 97%)    O2 Parameters below as of 27 Apr 2024 05:04  Patient On (Oxygen Delivery Method): room air                 Input and Output:  I&O's Detail    25 Apr 2024 07:01  -  26 Apr 2024 07:00  --------------------------------------------------------  IN:    sodium chloride 0.9%: 825 mL  Total IN: 825 mL    OUT:    Incontinent per Condom Catheter (mL): 900 mL  Total OUT: 900 mL    Total NET: -75 mL      26 Apr 2024 07:01  -  27 Apr 2024 06:07  --------------------------------------------------------  IN:  Total IN: 0 mL    OUT:    Voided (mL): 875 mL  Total OUT: 875 mL    Total NET: -875 mL          Lab Data                        13.0   6.60  )-----------( 190      ( 26 Apr 2024 08:05 )             40.9     04-26    143  |  108  |  19  ----------------------------<  140<H>  3.9   |  29  |  1.30    Ca    8.6      26 Apr 2024 08:05  Phos  2.4     04-26  Mg     2.1     04-26    TPro  5.9<L>  /  Alb  2.8<L>  /  TBili  0.5  /  DBili  x   /  AST  22  /  ALT  37  /  AlkPhos  98  04-26      CARDIAC MARKERS ( 25 Apr 2024 15:45 )  x     / x     / 64 U/L / x     / 3.5 ng/mL        Review of Systems	      Objective     Physical Examination    heart s1s2  lung dc BS  head nc      Pertinent Lab findings & Imaging      Eliecer:  NO   Adequate UO     I&O's Detail    25 Apr 2024 07:01  -  26 Apr 2024 07:00  --------------------------------------------------------  IN:    sodium chloride 0.9%: 825 mL  Total IN: 825 mL    OUT:    Incontinent per Condom Catheter (mL): 900 mL  Total OUT: 900 mL    Total NET: -75 mL      26 Apr 2024 07:01  -  27 Apr 2024 06:07  --------------------------------------------------------  IN:  Total IN: 0 mL    OUT:    Voided (mL): 875 mL  Total OUT: 875 mL    Total NET: -875 mL               Discussed with:     Cultures:	        Radiology

## 2024-04-27 NOTE — PROGRESS NOTE ADULT - PROBLEM SELECTOR PLAN 1
History of Afib, on home Metoprolol BID and Eliquis   - Initial ECG showed sinus tachycardia @ 116bpm, however repeat ECG showed afib with RVR  - S/P PO , Cardizem 10mg IVP x2 and Cardizem gtt @10cc/hr in ED; patient converted to normal sinus  - Most recent EKbpm, QTc 424, NSR   - Maintaining NSR, HR in the 80's this AM   - Continue Eliquis 2.5mg BID  - Increase to Metoprolol 50mg q8hrs   - TTE 2024: technically difficult study, LV no well visualized however LV probably normal based on limited views, moderate thickening of aortic valve leaflets, trace TR, dilated IVC with normal inspiratory collapse, normal pulmonary artery pressure  - Cardio rec starting Amiodarone, however, family is hesitant to start   - Thyroid levels WNL   - Monitor and replete lytes, keep K>4, Mg>2  - Cardio following Patient was found to have left M3 occlusion   - CTA brain: Left M3 branch occlusion. No vascular aneurysm. No AVM.  - CTA neck: No flow-limiting stenosis, evidence for arterial dissection, or vascular aneurysm.   - Aspirin 81 mg daily, Atorvastatin 80 mg qhs  - Keep O2 sat > 92%  - Lipid profile within normal, TSH within normal   - Known hx of DM A1C 6.8  - Neuro checks q4h  - Permissive HTN for 24 hours up to 220/110 (since no tPa given)  - Gradual normotension after 24 hours (to goal BP < 140/90 assuming no major intracranial stenosis seen on CTA head/neck)  - Telemetry monitoring to r/o arrhythmia   - NPO, bedside speech and swallow evaluation followed by formal speech evaluation if necessary  - Case discussed with Tele stroke and neurology Dr. Underwood  - Fall and aspiration precautions  - F/u MRI head NC as per neurology recommendations  - Neurology Dr. Underwood consulted, f/u recs  - PT/OT evaluation

## 2024-04-27 NOTE — DIETITIAN INITIAL EVALUATION ADULT - PERTINENT LABORATORY DATA
04-27    141  |  107  |  17  ----------------------------<  151<H>  3.9   |  29  |  1.30    Ca    8.8      27 Apr 2024 07:25  Phos  2.4     04-27  Mg     2.0     04-27    TPro  5.9<L>  /  Alb  2.8<L>  /  TBili  0.5  /  DBili  x   /  AST  22  /  ALT  30  /  AlkPhos  99  04-27  POCT Blood Glucose.: 236 mg/dL (04-27-24 @ 09:53)  A1C with Estimated Average Glucose Result: 6.8 % (04-26-24 @ 08:05)

## 2024-04-27 NOTE — PROCEDURE NOTE - ADDITIONAL PROCEDURE DETAILS
Acute CVA  Afib  MERRILL  Chronic hypoxemic/hypercapnic respiratory failure Performed under US guidance    Date of entry of this note is equal to the date of services rendered.     Acute CVA  Afib  MERRILL  Chronic hypoxemic/hypercapnic respiratory failure

## 2024-04-27 NOTE — DIETITIAN INITIAL EVALUATION ADULT - ORAL INTAKE PTA/DIET HISTORY
per patient's wife at bedside, patient generally has cottage cheese with berries, was also eating oatmeal with that, but due to noticing blood glucose levels elevated, , cut oatmeal, lunch and dinner generally chicken or fish with starch and vegetable.

## 2024-04-27 NOTE — PROGRESS NOTE ADULT - PROBLEM SELECTOR PLAN 4
MERRILL on admission. Baseline Cr around 1.1  - On admission Cr 1.6  - Continue IV NS @ 75cc/hr x 12 hrs  - Avoid nephrotoxics - hold home furosemide  - F/u BMP in AM 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  - R big toe dressing c/d/i  - Continue Cipro 250mg BID (renally dosed)  - Can increase back to home dose Cipro 500mg BID pending improvement of kidney function   - Per podiatry, nothing to culture at this time   - Continue local wound care   - f/u MRSA/MSSA PCR  - ID following   - Podiatry following

## 2024-04-27 NOTE — CHART NOTE - NSCHARTNOTEFT_GEN_A_CORE
discussed with cardio team and patient's own cardiologist service Dr Mathews agree to change metoprolol to amiodarone  for A fib control. will start amio drip as ordered.. discussed with cardio team and patient's own cardiologist service Dr Mathews agree to change metoprolol to amiodarone  for A fib control. will start amio drip as ordered..  Failed bedside nursing SWALLOW EVAL As per stroke protocol, discussed with wife at bedside about NG tube placement for medication and feeding till further S/S eval, wife prefer not to have the NG tube at current, interchange medications from oral to IV , allow lipitor po for tonight.   called by nursing, patient HR has persist around 150, Sinus tachy on  Tele monitor. BP at current 125/88 , will give RL 250ml bolus and increased rate to 100cc/hr to allow permissive HTN, discussed with nursing.  bladder scan to r/o urinary retention, discussed with nursing. ICU eval called.

## 2024-04-27 NOTE — PROGRESS NOTE ADULT - ASSESSMENT
83yoM PMHx AFib on Eliquis, CAD, HTN, DM, HLD, COPD, osteomyelitis presents c/o shortness of breath, chest discomfort. Admitted for AFib w/ RVR   83yoM PMHx AFib on Eliquis, CAD, HTN, DM, HLD, COPD, osteomyelitis presents c/o shortness of breath, chest discomfort. Admitted for AFib w/ RVR. Hospital course c/b CVA on 4/27/2024. 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

## 2024-04-27 NOTE — STROKE CODE NOTE - ASSESSMENT/PLAN
82 yo M with PMHx AFib on Eliquis, CAD, HTN, DM, HLD, COPD, osteomyelitis presents c/o shortness of breath, chest discomfort. Admitted for AFib w/ RVR. Pt's last known well was ~8:40AM. Code stroke was called at 9:51AM for sudden-onset R-sided facial droop, incoherent speech, RUE weakness. Tele stroke contacted via audio call. Per discussion with Neuro PA Marielle Bullock - pt deemed not a candidate for TNK due to last Eliquis dose at 6:41AM. Thrombectomy candidate to be determined pending CT results, although unlikely. Further stroke workup by in-house Neurology recommended. STAT coags ordered. 82 yo M with PMHx AFib on Eliquis, CAD, HTN, DM, HLD, COPD, osteomyelitis presents c/o shortness of breath, chest discomfort. Admitted for AFib w/ RVR. Pt's last known well was ~8:40AM. Code stroke was called at 9:51AM for sudden-onset R-sided facial droop, incoherent speech, RUE weakness. Tele stroke contacted via audio call. Per discussion with Neuro PA Marielle Bullock - pt deemed not a candidate for TNK due to last Eliquis dose at 6:41AM. Thrombectomy candidate to be determined pending CT results, although unlikely. Further stroke workup by in-house Neurology recommended. STAT coags ordered.  Plan discussed with attending Dr. Sky at bedside  Family (wife) updated at bedside by Dr. Salgado. 84 yo M with PMHx AFib on Eliquis, CAD, HTN, DM, HLD, COPD, osteomyelitis presents c/o shortness of breath, chest discomfort. Admitted for AFib w/ RVR. Pt's last known well was ~8:40AM. Code stroke was called at 9:51AM for sudden-onset R-sided facial droop, incoherent speech, RUE weakness. Tele stroke contacted via audio call. Per discussion with Neuro PA Marielle Bullock - pt deemed not a candidate for TNK due to last Eliquis dose at 6:41AM. Thrombectomy candidate to be determined pending CT results, although unlikely. Further stroke workup by in-house Neurology recommended. STAT coags ordered.  Plan discussed with attending Dr. Sky at bedside  Family (wife) updated at bedside by Dr. Salgado.    Addendum (10:44 AM):  Per wet read by JONAH Bullock, decreased perfusion of L MCA noted on CT. Call to be placed to Neuro IR at [facility] to determine whether pt is thrombectomy candidate. 82 yo M with PMHx AFib on Eliquis, CAD, HTN, DM, HLD, COPD, osteomyelitis presents c/o shortness of breath, chest discomfort. Admitted for AFib w/ RVR. Pt's last known well was ~8:40AM. Code stroke was called at 9:51AM for sudden-onset R-sided facial droop, incoherent speech, RUE weakness. Tele stroke contacted via audio call. Per discussion with Neuro JONAH Bullock - pt deemed not a candidate for TNK due to last Eliquis dose at 6:41AM. Thrombectomy candidate to be determined pending CT results, although unlikely. Further stroke workup by in-house Neurology recommended. STAT coags ordered.  Plan discussed with attending Dr. Sky at bedside  Family (wife) updated at bedside by Dr. Salgado.    Addendum (10:44 AM):  Per wet read by JONAH Bullock, decreased perfusion of L MCA noted on CT. Call to be placed to Neuro IR at [facility] to determine whether pt is thrombectomy candidate.    Addendum (10:49AM):  Per JONAH Bullock, imaging reviewed by Neuro IR and pt deemed not a candidate for thrombectomy due to distal L M3 occlusion, occlusion is too distal. Continue AC, ASA and HD statin. MRI head ordered and inpatient Neuro to follow for further evaluation.

## 2024-04-27 NOTE — DIETITIAN INITIAL EVALUATION ADULT - PROBLEM SELECTOR PLAN 8
Chronic, baseline 2L home O2 and BiPAP qHS  - CXR wet read: no gross abnormalities  - continue home  montelukast  - Pulm Dr. Combs consulted

## 2024-04-27 NOTE — PROGRESS NOTE ADULT - PROBLEM SELECTOR PLAN 7
Chronic, on home insulin (Lantus 10u)  - consistent carb diet  - hold home insulin  - start low dose sliding scale  - hypoglycemia protocol in place, fingersticks q ac, q hs   - goal -180 in hospital setting, adjust insulin regimen prn  - f/u HbA1c Chronic  - BP stable  - Continue lopressor 50mg q8h PO  - Monitor routine hemodynamics Chronic  - BP stable  - Discontinue lopressor for permissive HTN x24h  - Monitor routine hemodynamics

## 2024-04-28 NOTE — PROGRESS NOTE ADULT - ATTENDING COMMENTS
83M h/o eosinophilic asthma/COPD, former smoker, chronic hypoxemic and hypercapnic respiratory failure on nocturnal BiPAP, AFib on Eliquis, CAD s/p CABG and PCI, HTN, HLD, type 2 DM, L femur fracture with chronic OM on cipro intially p/w rapid AFIb, admitted to tele and started on cardizem infusion with eventual conversion to sinus rhythm. Hospital course c/b Code Stroke on 4/29 2/2 acute L M3 occlusion but was not a candidate for TNK since already on full AC and deemed not a candidate for thrombectomy as occlusion too distal    Neuro:   - MRI from earlier confirmed multiple acute infarcts throughout the bilateral cerebral and cerebellar hemispheres likely embolic.  Also with a more dominant acute to subacute infarct in the posterior L MCA distribution.    - No hemorrhagic conversion on CT brain   - C/w phenylephrine to maintain SBP >160  - c/w ASA suppository, hold home statin as pt NPO    Cardio:  - c/w amio gtt for afib, Currently NSR  - Episode of 7 beats vtach overnight, asymptomatic. Continue to monitor on tele  - C/w phenylephrine to maintain SBP >160  - BB on hold for permissive hypertension    Pulm:   - Hx COPD, COURTNEY, Chronic hypoxemic/hypercapnic respiratory failure. c/w nocturnal BiPAP   - PRN duonebs    GI:  - NPO, failed bedside dysphagia. F/u S&S recs  - Wife declined NGT     Renal:   - No acute needs  - Replete lytes PRN    ID:   - c/w home cipro for suppression of chronic osteomyelitis.  - Local wound care for chronic R big toe wound     Endocrine:   - LDISS q6h while NPO.     Heme:   - Full AC d/c'ed 2/2 bleeding risk per neuro recs  - Start heparin 5000U q8h for AC and DVT ppx    #GOC: Full Code    Wife updated at bedside by Dr. Babb 83M h/o eosinophilic asthma/COPD, former smoker, chronic hypoxemic and hypercapnic respiratory failure on nocturnal BiPAP, AFib on Eliquis, CAD s/p CABG and PCI, HTN, HLD, type 2 DM, L femur fracture with chronic OM on cipro intially p/w rapid AFIb, admitted to tele and started on cardizem infusion with eventual conversion to sinus rhythm. Hospital course c/b Code Stroke on 4/29 2/2 acute L M3 occlusion but was not a candidate for TNK since already on full AC and deemed not a candidate for thrombectomy as occlusion too distal. Transferred to ICU for pressor assisted permissive HTN.    Neuro: MRI from 4/27 confirmed multiple acute infarcts throughout the bilateral cerebral and cerebellar hemispheres likely embolic and a more dominant acute to subacute infarct in the posterior L MCA distribution.    - continue ASA suppository until S/S eval  - continue phenylephrine to maintain SBP >160  - PT/OT   CV: AFib now converted to NSR after starting AMio infusion - continue loading dose  - blood pressure support with phenylephrine as above, hold home anti-hypertensives  Pulm: continue duonebs, nocturnal BiPAP  GI: NPO pending official S/S, failed bedside dysphagia screen  - offered NGT for feeds/meds but wife deferred pending results of S/S assessnent  Renal: no acute issues, trend renal indices and replete electrolytes as needed  ID: continue with home cipro for suppression of chronic osteomyelitis, converted to IV dosing  - Local wound care for chronic R big toe wound   Endo: continue FS q6hr while NPO with ISS coverage  Heme: hold full AC per neuro due to risk for hemorrhagic conversion, ok for DVT ppx with HSQ     Wife updated at bedside. At current, pt is full code but ongoing GOC discussions to be had based on overall progress from stroke standpoint

## 2024-04-28 NOTE — OCCUPATIONAL THERAPY INITIAL EVALUATION ADULT - PERTINENT HX OF CURRENT PROBLEM, REHAB EVAL
As per EMR, "Yesterday, pt was code stroke, not a candidate for TNK. Overnight, pt was a RRT for worsening NIH score. Repeat CT brain showed moderate-sized evolving acute/subacute L MCA territory infarct with no hemorrhagic conversion.  Brought to ICU for pressure support, Goal >160 systolic. Pt with increasing phenylephrine needs, increased to 0.9.  Pt also had 7 beats of V-tach, asymptomatic. Pt seen and examined at bedside this AM. Pt asleep, lethargic and minimally responsive to stimulation."

## 2024-04-28 NOTE — PHYSICAL THERAPY INITIAL EVALUATION ADULT - ADDITIONAL COMMENTS
Per wife at bedside, patient and wife live together in a private house with 6 [address], bed/bath upstairs. Independent with ADLs, used cane as needed PTA.

## 2024-04-28 NOTE — PROGRESS NOTE ADULT - PROBLEM SELECTOR PLAN 2
History of Afib, on home Metoprolol BID and Eliquis   - Initial ECG showed sinus tachycardia @ 116bpm, however repeat ECG showed afib with RVR  - S/P PO , Cardizem 10mg IVP x2 and Cardizem gtt @10cc/hr in ED; patient converted to normal sinus  - Most recent EKbpm, QTc 424, NSR   - Maintaining NSR, HR in the 80's this AM   - Continue Eliquis 2.5mg BID  - Increase to Metoprolol 50mg q8hrs   - TTE 2024: technically difficult study, LV no well visualized however LV probably normal based on limited views, moderate thickening of aortic valve leaflets, trace TR, dilated IVC with normal inspiratory collapse, normal pulmonary artery pressure  - Cardio rec starting Amiodarone, however, family is hesitant to start   - Thyroid levels WNL   - Monitor and replete lytes, keep K>4, Mg>2  - Cardio following History of Afib, on home Metoprolol BID and Eliquis   - Initial ECG showed sinus tachycardia @ 116bpm, however repeat ECG showed afib with RVR  - TTE 4/26/2024: LV no well visualized however LV probably normal based on limited views, moderate thickening of aortic valve leaflets, trace TR, dilated IVC with normal inspiratory collapse, normal pulmonary artery pressure  - Hold Eliquis as patient NPO   - Hold home BB to allow for permissive HTN   - Full dose AC discontinued 2/2 high bleeding risk per neuro   - Continue Amiodarone gtt   - Monitor on tele   - Monitor and replete lytes, keep K>4, Mg>2  - Cardio following  - Plan per ICU

## 2024-04-28 NOTE — OCCUPATIONAL THERAPY INITIAL EVALUATION ADULT - LEVEL OF INDEPENDENCE: EATING, OT EVAL
Area L Indication Text: Tumors in this location are included in Area L (trunk and extremities).  Mohs surgery is indicated for larger tumors, or tumors with aggressive histologic features, in these anatomic locations. dependent (less than 25% patients effort)

## 2024-04-28 NOTE — PROGRESS NOTE ADULT - PROBLEM SELECTOR PLAN 3
Elevated trop on admission likely 2/2 Afib w/ RVR   - Most recent EKbpm, QTc 424, NSR   - Trop 507.9 --> 448.2  - TTE 2024: technically difficult study, LV no well visualized however LV probably normal based on limited views, moderate thickening of aortic valve leaflets, trace TR, dilated IVC with normal inspiratory collapse, normal pulmonary artery pressure  - No complaints of chest pain, low suspicions of ACS --> monitor for signs and symptoms of ischemia   - Cardio following Elevated trop on admission likely 2/2 Afib w/ RVR   - TTE 4/26/2024: technically difficult study, LV no well visualized however LV probably normal based on limited views, moderate thickening of aortic valve leaflets, trace TR, dilated IVC with normal inspiratory collapse, normal pulmonary artery pressure  - Trop 507.9 --> 448.2  - No complaints of chest pain, low suspicions of ACS --> monitor for signs and symptoms of ischemia   - Cardio following Elevated trop on admission likely 2/2 Afib w/ RVR   - TTE 4/26/2024: technically difficult study, LV no well visualized however LV probably normal based on limited views, moderate thickening of aortic valve leaflets, trace TR, dilated IVC with normal inspiratory collapse, normal pulmonary artery pressure  - Trop x2 both sets elevated but downtrended   - No complaints of chest pain, low suspicions of ACS --> monitor for signs and symptoms of ischemia   - Cardio following  - Plan per ICU

## 2024-04-28 NOTE — PROGRESS NOTE ADULT - ASSESSMENT
83-year-old  black male with history of COPD coronary artery disease hypertension diabetes hyperlipidemia atrial fibrillation on Eliquis as well as osteomyelitis who presents now to the emergency department at [facility] complaining of rapid heart rate    jacy  copd  AF  OP  OA  CAD  HTN  DM  HLD  OM hx    new cva  rrt and code stroke reviewed  vs noted  ICU care in progress    follows with Dr Ryland ashley med  uses NIV - BIPAP night time for JACY - sleep hygiene reviewed  cardio eval - [facility]vs rx regimen  BP control  DM care  AF rate and rhythm control  TFT  outpatient record reviewed  proventil PRN - Singulair - copd  spoke with WIFE  on emp ABX   wound care

## 2024-04-28 NOTE — PHYSICAL THERAPY INITIAL EVALUATION ADULT - LEVEL OF INDEPENDENCE: SIT/SUPINE, REHAB EVAL
impaired strength observed from a supine position, lethargic, impaired command following/unable to perform

## 2024-04-28 NOTE — PHYSICAL THERAPY INITIAL EVALUATION ADULT - LEVEL OF CONSCIOUSNESS, REHAB EVAL
opens eyes briefly, requires repeated verbal/tactile cues to maintain alertness./lethargic/somnolent

## 2024-04-28 NOTE — PROGRESS NOTE ADULT - SUBJECTIVE AND OBJECTIVE BOX
Date/Time Patient Seen:  		  Referring MD:   Data Reviewed	       Patient is a 83y old  Male who presents with a chief complaint of AFib w/ RVR (27 Apr 2024 22:15)      Subjective/HPI     PAST MEDICAL & SURGICAL HISTORY:  Diabetes Mellitus, Type II    CAD (Coronary Artery Disease)  s/p 3v CABG 2004; stents placed in [place] in 2019    Dyslipidemia    Asymptomatic Peripheral Vascular Disease    Osteomyelitis    COPD (chronic obstructive pulmonary disease)  on 2L at home and BiPAP at night; intubated 6/18    Diabetes mellitus    Hypertension    PVD (peripheral vascular disease)    History of PAT (paroxysmal atrial tachycardia)    Asthma with COPD    BPH (benign prostatic hyperplasia)    Acute osteomyelitis    CABG (Coronary Artery Bypass Graft)  2004    Compound fracture  left leg    S/P primary angioplasty with coronary stent    H/O drainage of abscess  Left femur 12/2021          Medication list         MEDICATIONS  (STANDING):  aMIOdarone Infusion 1 mG/Min (33.3 mL/Hr) IV Continuous <Continuous>  aMIOdarone Infusion 0.5 mG/Min (16.7 mL/Hr) IV Continuous <Continuous>  aspirin enteric coated 81 milliGRAM(s) Oral daily  atorvastatin 80 milliGRAM(s) Oral at bedtime  ciprofloxacin   IVPB 400 milliGRAM(s) IV Intermittent every 12 hours  dextrose 10% Bolus 125 milliLiter(s) IV Bolus once  dextrose 5%. 1000 milliLiter(s) (100 mL/Hr) IV Continuous <Continuous>  dextrose 5%. 1000 milliLiter(s) (50 mL/Hr) IV Continuous <Continuous>  dextrose 50% Injectable 25 Gram(s) IV Push once  dextrose 50% Injectable 12.5 Gram(s) IV Push once  enoxaparin Injectable 100 milliGRAM(s) SubCutaneous every 12 hours  glucagon  Injectable 1 milliGRAM(s) IntraMuscular once  insulin lispro (ADMELOG) corrective regimen sliding scale   SubCutaneous every 6 hours  lactated ringers. 1000 milliLiter(s) (100 mL/Hr) IV Continuous <Continuous>  phenylephrine    Infusion 0.2 MICROgram(s)/kG/Min (7.58 mL/Hr) IV Continuous <Continuous>  tamsulosin 0.4 milliGRAM(s) Oral at bedtime    MEDICATIONS  (PRN):  acetaminophen     Tablet .. 650 milliGRAM(s) Oral every 6 hours PRN Temp greater or equal to 38C (100.4F), Mild Pain (1 - 3)  albuterol    0.083% 2.5 milliGRAM(s) Nebulizer every 6 hours PRN Bronchospasm  dextrose Oral Gel 15 Gram(s) Oral once PRN Blood Glucose LESS THAN 70 milliGRAM(s)/deciliter         Vitals log        ICU Vital Signs Last 24 Hrs  T(C): 37.5 (28 Apr 2024 05:47), Max: 37.7 (27 Apr 2024 23:46)  T(F): 99.5 (28 Apr 2024 05:47), Max: 99.9 (27 Apr 2024 23:46)  HR: 79 (28 Apr 2024 06:15) (75 - 151)  BP: 156/74 (28 Apr 2024 06:15) (128/87 - 218/105)  BP(mean): 106 (28 Apr 2024 06:15) (98 - 150)  ABP: --  ABP(mean): --  RR: 16 (28 Apr 2024 06:15) (9 - 23)  SpO2: 97% (28 Apr 2024 06:15) (93% - 100%)    O2 Parameters below as of 28 Apr 2024 04:00  Patient On (Oxygen Delivery Method): BiPAP/CPAP                 Input and Output:  I&O's Detail    26 Apr 2024 07:01  -  27 Apr 2024 07:00  --------------------------------------------------------  IN:  Total IN: 0 mL    OUT:    Voided (mL): 875 mL  Total OUT: 875 mL    Total NET: -875 mL      27 Apr 2024 07:01  -  28 Apr 2024 06:17  --------------------------------------------------------  IN:    Amiodarone: 133.6 mL    IV PiggyBack: 200 mL    Lactated Ringers: 800 mL    Phenylephrine: 60.7 mL  Total IN: 1194.3 mL    OUT:    Incontinent per Condom Catheter (mL): 450 mL    Voided (mL): 1700 mL  Total OUT: 2150 mL    Total NET: -955.7 mL          Lab Data                        13.6   5.90  )-----------( 178      ( 27 Apr 2024 10:38 )             43.1     04-27    142  |  105  |  15  ----------------------------<  174<H>  4.1   |  30  |  1.40<H>    Ca    8.8      27 Apr 2024 14:47  Phos  2.4     04-27  Mg     2.0     04-27    TPro  6.1  /  Alb  2.9<L>  /  TBili  0.5  /  DBili  x   /  AST  23  /  ALT  31  /  AlkPhos  104  04-27    ABG - ( 27 Apr 2024 11:10 )  pH, Arterial: 7.40  pH, Blood: x     /  pCO2: 49    /  pO2: 69    / HCO3: 30    / Base Excess: 5.6   /  SaO2: 94.5              CARDIAC MARKERS ( 27 Apr 2024 10:38 )  x     / x     / 63 U/L / x     / 3.9 ng/mL        Review of Systems	      Objective     Physical Examination    heart s1s2  lung dc BS  head nc      Pertinent Lab findings & Imaging      Eliecer:  NO   Adequate UO     I&O's Detail    26 Apr 2024 07:01  -  27 Apr 2024 07:00  --------------------------------------------------------  IN:  Total IN: 0 mL    OUT:    Voided (mL): 875 mL  Total OUT: 875 mL    Total NET: -875 mL      27 Apr 2024 07:01  -  28 Apr 2024 06:17  --------------------------------------------------------  IN:    Amiodarone: 133.6 mL    IV PiggyBack: 200 mL    Lactated Ringers: 800 mL    Phenylephrine: 60.7 mL  Total IN: 1194.3 mL    OUT:    Incontinent per Condom Catheter (mL): 450 mL    Voided (mL): 1700 mL  Total OUT: 2150 mL    Total NET: -955.7 mL               Discussed with:     Cultures:	        Radiology

## 2024-04-28 NOTE — PROGRESS NOTE ADULT - ASSESSMENT
83-year-old M with history of COPD on home O2 PRN, coronary artery disease s/p CABG,  hypertension, diabetes, hyperlipidemia, atrial fibrillation on Eliquis as well as chronic osteomyelitis who presents to the emergency department at [facility] complaining of rapid heart rate. Cardiology consulted for afib with rvr.    -Presenting with pAF with RVR  -At home he had noted some SOB, his wife checked his HR and noted it up was up to 160s so brought him to the ER, then  placed on Cardizem gtt, had missed home BB   - on the morning of 4/27, patient was noted to have new onset aphasia and a code stroke was called.  He was deemed not a candidate for TNK as he is on AC.  He was found to have a L M3 occlusion but was deemed not a candidate for thrombectomy as occlusion too distal.  Patient was a RRT tonight due to worsening NIH score noted by nursing.  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  ICU was consulted for vasopressor needs to maintain SBP >160 to optimize perfusion.   - Continue phenylephrine to maintain elevated MAP to optimize cerebral perfusion  - Now back in NSR.  ON amio gtt.  Family had previously refused amio  - On full dose Lovenox for AC.  Patient reports that he has missed doses of Eliquis at home.  Would not deem this eliquis failure  - BB on hold for permissive hypertension  - Continue aspirin  - Continue statin drug  - Continue telemetry  - Monitor and replete electrolytes. Keep K>4.0 and Mg>2.0.

## 2024-04-28 NOTE — PROGRESS NOTE ADULT - PROBLEM SELECTOR PLAN 9
Chronic, baseline 2L home O2 and BiPAP qHS  - CXR wet read: no gross abnormalities  - Satting well on RA   - Continue home montelukast  - Continue Bipap overnight   - Pulm Dr. Combs consulted Chronic, baseline 2L home O2 and BiPAP qHS  - CXR wet read: no gross abnormalities  - Satting well on RA   - Hold home montelukast as NPO  - Continue Bipap overnight   - Pulm Dr. Combs consulted  - Plan per ICU

## 2024-04-28 NOTE — PROGRESS NOTE ADULT - PROBLEM SELECTOR PLAN 1
Patient was found to have left M3 occlusion   - CTA brain: Left M3 branch occlusion. No vascular aneurysm. No AVM.  - CTA neck: No flow-limiting stenosis, evidence for arterial dissection, or vascular aneurysm.   - MRI Head: Multiple acute infarcts scattered throughout the bilateral cerebral and cerebellar hemispheres. More dominant acute to subacute infarction in the posterior left MCA vascular distribution. Chronic small vessel disease.  - Aspirin 81 mg daily, Atorvastatin 80 mg qhs  - Keep O2 sat > 92%  - Lipid profile within normal, TSH within normal   - Known hx of DM A1C 6.8  - Neuro checks q4h  - Permissive HTN for 24 hours up to 220/110 (since no tPa given)  - Gradual normotension after 24 hours (to goal BP < 140/90 assuming no major intracranial stenosis seen on CTA head/neck)  - Telemetry monitoring to r/o arrhythmia   - NPO, bedside speech and swallow evaluation followed by formal speech evaluation if necessary  - Case discussed with Tele stroke and neurology Dr. Underwood  - Fall and aspiration precautions  - F/u MRI head NC as per neurology recommendations  - Neurology Dr. Underwood consulted, f/u recs  - PT/OT evaluation Patient was found to have left M3 occlusion. Transferred to ICU for worsening stroke symptoms and pressor support   - CTA brain: Left M3 branch occlusion. No vascular aneurysm. No AVM.  - CTA neck: No flow-limiting stenosis, evidence for arterial dissection, or vascular aneurysm.  - CT Head Repeat: Moderate-sized evolving acute/subacute infarct in the left middle cerebral artery vascular territory, centered in the ventral left parietal convexity, with extension into the posterior aspect of the left insula. No hemorrhagic conversion.   - MRI Head: Multiple acute infarcts scattered throughout the bilateral cerebral and cerebellar hemispheres. More dominant acute to subacute infarction in the posterior left MCA vascular distribution. Chronic small vessel disease.  - Continue Aspirin 81 suppository   - Continue phenylephrine gtt to maintain -170 for permissive HTN   - Keep O2 sat > 92%  - Neuro checks q4h  - NPO pending speech and swallow   - Fall and aspiration precautions, PT/OT/Speech and Swallow eval   - Neurology Dr. Underwood consulted, f/u recs  - Plan per ICU

## 2024-04-28 NOTE — PROGRESS NOTE ADULT - PROBLEM SELECTOR PLAN 10
DVT Prophylaxis:   - Discussed with pharmacy, patient no longer meets reduced Eliquis dose requirements with improving renal function. Increased Eliquis to 5mg BID with improved renal function  - ADDENDUM:   Patient NPO, did not pass RN bedside dysphagia screen. Unable to tolerate PO medications    Wife Sania updated by Dr. Sky, defer NG tube at this time.   Start FD AC with Lovenox DVT Prophylaxis: Heparin 5000u subq q8hrs   - Hold full dose AC as high bleeding risk per neuro

## 2024-04-28 NOTE — PROGRESS NOTE ADULT - ATTENDING COMMENTS
Patient was seen and examined by myself. Case was discussed with house staff in details. I have reviewed and agree with the plan as outlined above with edits where appropriate.    HPI:  83yoM PMHx AFib on Eliquis, CAD, HTN, DM, HLD, COPD, osteomyelitis presents c/o shortness of breath, chest discomfort. Pt reports onset of rapid heart rate this morning, HR measured 160s when he checked his pulse ox. Also endorses dyspnea exacerbated by movement. Pt reports last episode of AFib in 2020, no episodes since then until today's presentation. Pt states that chest discomfort feels like chest pressure, no chest pain. Pt also reports chronic R big toe wound for the past few months, follows w/ Wound Care. States that he has increased sensitivity to R sole of foot, but denies pain, numbness/tingling. Denies fevers, chills, abdominal pain, N/V/D/C.     IN THE ED:  Temp 98  F ,  , /81  ,RR 18 , SpO2 94%  S/P PO , IV diltiazem 10mg x2, IV diltiazem gtt @ 10 mg/hr  Labs significant for BUN/Cr 25/1.6, troponin 507.9, pBNP 355  Imaging:   CXR wet read: no gross abnormalities (25 Apr 2024 12:13)        GEN: appears weak   HEENT- normocephalic; mouth moist  CVS- S1S2+  LUNGS- clear to auscultation  ABD: Soft , nontender, nondistended, Bowel sounds are present  EXTREMITY: no calf tenderness, no cyanosis, no edema  NEURO: opens eye upon verbal stimuli, right facial drop, R hemiparesis, Left sided limb movement noted   PSYCH: + AMS   BACK: no swelling or mass;   VASCULAR: distal peripheral pulses present  SKIN: warm and dry.       Labs and imaging reviewed      Patient presenting with Patient is a 83y old  Male who presents with a chief complaint of AFib w/ RVR (26 Apr 2024 13:13)   admitted for   1. Afib with RVR, off cardizem drip, was on metoprolol 50mg q8h, now started amiodarone drip.     TTE:   1. Technically difficult image quality.   2. The LV is not well visualized, however the LV function is probably normal based on limited views.   3. The right ventricle is not well visualized. probably normal systolic function.   4. There is moderate thickening of the aortic valve leaflets.   5. Structurally normal mitral valve with normal leaflet excursion.   6. Trace tricuspid regurgitation.   7. The inferior vena cava is dilated measuring 2.18 cm in diameter, (dilated >2.1cm) with normal inspiratory collapse (normal >50%) consistent with mildly elevated right atrial pressure (~8, range 5-10mmHg).   8. Estimated pulmonary artery systolic pressure is 26 mmHg, consistent with normal pulmonary artery pressure.  cardio eval noted     2. Acute CVA: change of Mental status and speech, focal weakness noted yesterday , stroke code called, UPGRADED to ICU due to worsening neuro condition and need pressor to allow permissive HTN    CTH CTA: Left M3 branch occlusion. on ASA, HIGH DOSE Statin, permissive hypertension. AC due to bleeding risk.     MR 1.  Multiple acute infarcts scattered throughout the bilateral cerebral and cerebellar hemispheres. Given multiple vascular distributions   involved, an embolic source should be considered. 2.  More dominant acute to subacute infarction in the posterior left MCA vascular distribution.3.  Chronic small vessel disease.  under ICU care    3. raspatory acidosis: due to CO2 retention, bipap and oxygen supply.   4. Right 1st toe ulcer, Left femoral intramedullary abscess: ID eval noted c/w cipro   5. DM: insulin lantus with RISS , FS goal 100-180  6. chronic RF with hypoxia and hypercapnea on home O2, BIPAP QHS, albuteral neb prn , ABG reviewed. c/w singulair

## 2024-04-28 NOTE — PROGRESS NOTE ADULT - SUBJECTIVE AND OBJECTIVE BOX
[facility] - Génesis Villavicencio Pannella, Patel, Savella, Goodger, Cohen  Office Number:  [phone]    Patient resting comfortably in bed in NAD.  On the morning of 4/28 patient was noted to have new onset aphasia and a code stroke was called.  He was deemed not a candidate for TNK as he was on AC.  He was found to have a L M3 occlusion but was deemed not a candidate for thrombectomy as occlusion too distal.  Patient was a RRT tonight due to worsening NIH score noted by nursing.  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  ICU was consulted for vasopressor needs to maintain SBP >160 to optimize perfusion.  He is now on phenylephrine in the iCU.  He reports no chest pain.  Still have dysarthria.  Admits to not being 100% compliant with outpatient Eliquis.  He is now in NSR on amio gtt.     F/U for:  AF    Telemetry:  SR    MEDICATIONS  (STANDING):  aMIOdarone Infusion 1 mG/Min (33.3 mL/Hr) IV Continuous <Continuous>  aMIOdarone Infusion 0.5 mG/Min (16.7 mL/Hr) IV Continuous <Continuous>  aspirin enteric coated 81 milliGRAM(s) Oral daily  atorvastatin 80 milliGRAM(s) Oral at bedtime  ciprofloxacin   IVPB 400 milliGRAM(s) IV Intermittent every 12 hours  dextrose 10% Bolus 125 milliLiter(s) IV Bolus once  dextrose 5%. 1000 milliLiter(s) (100 mL/Hr) IV Continuous <Continuous>  dextrose 5%. 1000 milliLiter(s) (50 mL/Hr) IV Continuous <Continuous>  dextrose 50% Injectable 12.5 Gram(s) IV Push once  dextrose 50% Injectable 25 Gram(s) IV Push once  enoxaparin Injectable 100 milliGRAM(s) SubCutaneous every 12 hours  glucagon  Injectable 1 milliGRAM(s) IntraMuscular once  insulin lispro (ADMELOG) corrective regimen sliding scale   SubCutaneous every 6 hours  lactated ringers. 1000 milliLiter(s) (100 mL/Hr) IV Continuous <Continuous>  phenylephrine    Infusion 0.2 MICROgram(s)/kG/Min (7.58 mL/Hr) IV Continuous <Continuous>  tamsulosin 0.4 milliGRAM(s) Oral at bedtime    MEDICATIONS  (PRN):  acetaminophen     Tablet .. 650 milliGRAM(s) Oral every 6 hours PRN Temp greater or equal to 38C (100.4F), Mild Pain (1 - 3)  albuterol    0.083% 2.5 milliGRAM(s) Nebulizer every 6 hours PRN Bronchospasm  dextrose Oral Gel 15 Gram(s) Oral once PRN Blood Glucose LESS THAN 70 milliGRAM(s)/deciliter      Allergies    No Known Allergies    Intolerances        Vital Signs Last 24 Hrs  T(C): 37.5 (28 Apr 2024 05:47), Max: 37.7 (27 Apr 2024 23:46)  T(F): 99.5 (28 Apr 2024 05:47), Max: 99.9 (27 Apr 2024 23:46)  HR: 86 (28 Apr 2024 08:00) (73 - 151)  BP: 156/69 (28 Apr 2024 08:00) (128/90 - 218/105)  BP(mean): 99 (28 Apr 2024 08:00) (96 - 150)  RR: 18 (28 Apr 2024 08:00) (9 - 23)  SpO2: 99% (28 Apr 2024 08:00) (93% - 100%)    Parameters below as of 28 Apr 2024 08:00  Patient On (Oxygen Delivery Method): room air        I&O's Summary    27 Apr 2024 07:01  -  28 Apr 2024 07:00  --------------------------------------------------------  IN: 1194.3 mL / OUT: 2150 mL / NET: -955.7 mL        ON EXAM:    General: NAD, awake and alert, oriented x 3  HEENT: Mucous membranes are moist, anicteric  Lungs: Non-labored, breath sounds are clear bilaterally, No wheezing, rales or rhonchi  Cardiovascular: Regular, S1 and S2, no murmurs, rubs, or gallops  Gastrointestinal: Bowel Sounds present, soft, nontender.   Lymph: No peripheral edema. No lymphadenopathy.  Skin: No rashes or ulcers  Psych:  Mood & affect appropriate for clinical condition    LABS: All Labs Reviewed:                        13.6   5.90  )-----------( 178      ( 27 Apr 2024 10:38 )             43.1                         12.7   5.88  )-----------( 186      ( 27 Apr 2024 07:25 )             40.1                         13.0   6.60  )-----------( 190      ( 26 Apr 2024 08:05 )             40.9     27 Apr 2024 14:47    142    |  105    |  15     ----------------------------<  174    4.1     |  30     |  1.40   27 Apr 2024 10:38    140    |  107    |  18     ----------------------------<  267    3.9     |  28     |  1.40   27 Apr 2024 07:25    141    |  107    |  17     ----------------------------<  151    3.9     |  29     |  1.30     Ca    8.8        27 Apr 2024 14:47  Ca    8.6        27 Apr 2024 10:38  Ca    8.8        27 Apr 2024 07:25  Phos  2.4       27 Apr 2024 07:25  Phos  2.4       26 Apr 2024 08:05  Mg     2.0       27 Apr 2024 07:25  Mg     2.1       26 Apr 2024 08:05  Mg     2.1       25 Apr 2024 10:05    TPro  6.1    /  Alb  2.9    /  TBili  0.5    /  DBili  x      /  AST  23     /  ALT  31     /  AlkPhos  104    27 Apr 2024 10:38  TPro  5.9    /  Alb  2.8    /  TBili  0.5    /  DBili  x      /  AST  22     /  ALT  30     /  AlkPhos  99     27 Apr 2024 07:25  TPro  5.9    /  Alb  2.8    /  TBili  0.5    /  DBili  x      /  AST  22     /  ALT  37     /  AlkPhos  98     26 Apr 2024 08:05    PT/INR - ( 27 Apr 2024 10:38 )   PT: 13.3 sec;   INR: 1.14 ratio         PTT - ( 27 Apr 2024 10:38 )  PTT:31.0 sec  CARDIAC MARKERS ( 27 Apr 2024 10:38 )  x     / x     / 63 U/L / x     / 3.9 ng/mL      Blood Culture:     04-25 @ 15:45  TSH: 1.56

## 2024-04-28 NOTE — OCCUPATIONAL THERAPY INITIAL EVALUATION ADULT - MUSCLE TONE ASSESSMENT, REHAB EVAL
Pt's right UE appears flaccid, however, pt lethargic t/o and not participating in formal assessment./right/left

## 2024-04-28 NOTE — PROGRESS NOTE ADULT - PROBLEM SELECTOR PLAN 4
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  - R big toe dressing c/d/i  - Continue Cipro 250mg BID (renally dosed)  - Can increase back to home dose Cipro 500mg BID pending improvement of kidney function   - Per podiatry, nothing to culture at this time   - Continue local wound care   - f/u MRSA/MSSA PCR  - ID following   - Podiatry following 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  - R big toe dressing c/d/i  - Start Cipro IV as patient NPO   - Per podiatry, nothing to culture at this time   - Continue local wound care   - f/u MRSA/MSSA PCR  - ID following   - Podiatry following  - Plan per ICU

## 2024-04-28 NOTE — PROGRESS NOTE ADULT - TIME BILLING
direct patient care including but not limited to reviewing chart, medications ,laboratory data, imaging reports, discussion of plan of care with consultants on the case, coordination of care with multidisciplinary team involved in the case and discussion of plan with family.  family agreeable to plan of care and verbalized understanding the anticipated hospital course and treatment plan.

## 2024-04-28 NOTE — OCCUPATIONAL THERAPY INITIAL EVALUATION ADULT - ADDITIONAL COMMENTS
Pt lives with his wife in a house with 8 [address] with a rail, bedroom upstairs. Pt uses a cane for functional mobility at times. Pt owns a stall shower.

## 2024-04-28 NOTE — PROVIDER CONTACT NOTE (OTHER) - ASSESSMENT
Patient calm in bed. No complains of pain or discomfort. No S&S of respiratory distress noticed. BP: 169/97, HR: 98, Temp: 98.1, RR: 18, OxSat: 94% at room air.

## 2024-04-28 NOTE — PROGRESS NOTE ADULT - PROBLEM SELECTOR PLAN 8
Chronic, on home insulin (Lantus 10u)  - consistent carb diet -->NPO   - hold home insulin  - start low dose sliding scale  - hypoglycemia protocol in place, fingersticks q ac, q hs   - goal -180 in hospital setting, adjust insulin regimen prn  - f/u HbA1c Chronic, on home insulin (Lantus 10u)  - NPO   - hold home insulin  - start low dose sliding scale  - hypoglycemia protocol in place, fingersticks q ac, q hs   - goal -180 in hospital setting, adjust insulin regimen prn  - HbA1c 6.8  - Plan per ICU

## 2024-04-28 NOTE — PROGRESS NOTE ADULT - PROBLEM SELECTOR PLAN 5
MERRILL on admission. Baseline Cr around 1.1  - On admission Cr 1.6  - Continue IV NS @ 75cc/hr x 12 hrs  - Avoid nephrotoxics - hold home furosemide  - F/u BMP in AM MERRILL on admission. Baseline Cr around 1.1  - On admission Cr 1.6  - Continue IVF  - Avoid nephrotoxics - hold home furosemide  - F/u BMP in AM  - Plan per ICU

## 2024-04-28 NOTE — OCCUPATIONAL THERAPY INITIAL EVALUATION ADULT - GENERAL OBSERVATIONS, REHAB EVAL
MD orders for OT received, chart reviewed and contents noted. RN consulted prior to session, stated pt OK to participate. Pt received supine in bed, in NAD, on RA, wife present. Pt lethargic throughout evaluation, making formal assessment difficult. Pt appears to have no/limited to right UE, however, pt lethargic and not consistently following commands. Functional mobility held at this time due to pt's lethargy. Pt left supine in bed, all lines intact, wife present, RN aware of pt status.

## 2024-04-28 NOTE — PROGRESS NOTE ADULT - PROBLEM SELECTOR PLAN 7
Chronic  - BP stable  - Discontinue lopressor for permissive HTN x24h  - Monitor routine hemodynamics Chronic  - Continue Phenyephrine gtt to maintain -170 for permissive HTN   - Discontinue lopressor for permissive HTN x24h  - Monitor routine hemodynamics  - Plan per ICU

## 2024-04-28 NOTE — PROGRESS NOTE ADULT - ASSESSMENT
83yoM PMHx AFib on Eliquis, CAD, HTN, DM, HLD, COPD, osteomyelitis presents c/o shortness of breath, chest discomfort. Admitted for AFib w/ RVR. Hospital course c/b CVA on 4/27/2024. 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

## 2024-04-28 NOTE — OCCUPATIONAL THERAPY INITIAL EVALUATION ADULT - BED MOBILITY TRAINING, PT EVAL
Pt will perform supine to sitting at EOB with maximum assist to prepare for seated ADL tasks in 3-5 OT sessions.

## 2024-04-28 NOTE — PROGRESS NOTE ADULT - PROBLEM SELECTOR PLAN 6
Chronic  - continue home ASA, statin  - Lipid panel within normal Chronic  - continue asa suppository   - hold home statin as NPO   - Lipid panel within normal  - Plan per ICU

## 2024-04-28 NOTE — PROGRESS NOTE ADULT - SUBJECTIVE AND OBJECTIVE BOX
Patient is a 83y old  Male who presents with a chief complaint of AFib w/ RVR (28 Apr 2024 09:18)      INTERVAL HPI/OVERNIGHT EVENTS: Patient was seen and examined at bedside. Patient transferred to ICU overnight as his NIHSS increased. MRI showed multiple infarcts. CT showed no hemorrhagic conversion. Patient was lethargic this AM. He was satting well on RA, but unable to participate in exam. He is on phenylephrine to maintain permissive HTN.       MEDICATIONS  (STANDING):  aMIOdarone Infusion 0.5 mG/Min (16.7 mL/Hr) IV Continuous <Continuous>  aMIOdarone Infusion 1 mG/Min (33.3 mL/Hr) IV Continuous <Continuous>  aspirin Suppository 300 milliGRAM(s) Rectal daily  ciprofloxacin   IVPB 400 milliGRAM(s) IV Intermittent every 12 hours  dextrose 10% Bolus 125 milliLiter(s) IV Bolus once  dextrose 5%. 1000 milliLiter(s) (100 mL/Hr) IV Continuous <Continuous>  dextrose 5%. 1000 milliLiter(s) (50 mL/Hr) IV Continuous <Continuous>  dextrose 50% Injectable 12.5 Gram(s) IV Push once  dextrose 50% Injectable 25 Gram(s) IV Push once  heparin   Injectable 5000 Unit(s) SubCutaneous every 8 hours  insulin lispro (ADMELOG) corrective regimen sliding scale   SubCutaneous every 6 hours  lactated ringers. 1000 milliLiter(s) (100 mL/Hr) IV Continuous <Continuous>  phenylephrine    Infusion 0.2 MICROgram(s)/kG/Min (7.58 mL/Hr) IV Continuous <Continuous>    MEDICATIONS  (PRN):  albuterol    0.083% 2.5 milliGRAM(s) Nebulizer every 6 hours PRN Bronchospasm  albuterol/ipratropium for Nebulization 3 milliLiter(s) Nebulizer every 6 hours PRN Shortness of Breath and/or Wheezing      Allergies    No Known Allergies    Intolerances        REVIEW OF SYSTEMS:  Unable to be obtained 2/2 lethargy     Vital Signs Last 24 Hrs  T(C): 37.5 (28 Apr 2024 05:47), Max: 37.7 (27 Apr 2024 23:46)  T(F): 99.5 (28 Apr 2024 05:47), Max: 99.9 (27 Apr 2024 23:46)  HR: 72 (28 Apr 2024 10:30) (72 - 151)  BP: 163/72 (28 Apr 2024 10:30) (128/90 - 218/105)  BP(mean): 104 (28 Apr 2024 10:30) (96 - 150)  RR: 23 (28 Apr 2024 10:30) (9 - 23)  SpO2: 97% (28 Apr 2024 10:30) (93% - 100%)    Parameters below as of 28 Apr 2024 08:00  Patient On (Oxygen Delivery Method): room air        PHYSICAL EXAM:  GENERAL: NAD  HEENT:  anicteric, moist mucous membranes  CHEST/LUNG:  CTA b/l, no rales, wheezes, or rhonchi  HEART:  RRR, S1, S2  ABDOMEN:  BS+, soft, nontender, nondistended  EXTREMITIES: +chronic right big toe wound, no edema, cyanosis, or calf tenderness  NERVOUS SYSTEM: lethargic     LABS:                        13.8   9.20  )-----------( 242      ( 28 Apr 2024 09:00 )             43.6     CBC Full  -  ( 28 Apr 2024 09:00 )  WBC Count : 9.20 K/uL  Hemoglobin : 13.8 g/dL  Hematocrit : 43.6 %  Platelet Count - Automated : 242 K/uL  Mean Cell Volume : 85.5 fl  Mean Cell Hemoglobin : 27.1 pg  Mean Cell Hemoglobin Concentration : 31.7 gm/dL  Auto Neutrophil # : x  Auto Lymphocyte # : x  Auto Monocyte # : x  Auto Eosinophil # : x  Auto Basophil # : x  Auto Neutrophil % : x  Auto Lymphocyte % : x  Auto Monocyte % : x  Auto Eosinophil % : x  Auto Basophil % : x    28 Apr 2024 09:00    143    |  108    |  12     ----------------------------<  170    4.1     |  30     |  1.50     Ca    9.6        28 Apr 2024 09:00  Phos  2.4       28 Apr 2024 09:00  Mg     1.7       28 Apr 2024 09:00    TPro  6.5    /  Alb  3.0    /  TBili  0.6    /  DBili  x      /  AST  19     /  ALT  24     /  AlkPhos  102    28 Apr 2024 09:00    PT/INR - ( 27 Apr 2024 10:38 )   PT: 13.3 sec;   INR: 1.14 ratio         PTT - ( 27 Apr 2024 10:38 )  PTT:31.0 sec  Urinalysis Basic - ( 28 Apr 2024 09:00 )    Color: x / Appearance: x / SG: x / pH: x  Gluc: 170 mg/dL / Ketone: x  / Bili: x / Urobili: x   Blood: x / Protein: x / Nitrite: x   Leuk Esterase: x / RBC: x / WBC x   Sq Epi: x / Non Sq Epi: x / Bacteria: x      CAPILLARY BLOOD GLUCOSE      POCT Blood Glucose.: 165 mg/dL (28 Apr 2024 05:07)  POCT Blood Glucose.: 154 mg/dL (28 Apr 2024 00:06)  POCT Blood Glucose.: 164 mg/dL (27 Apr 2024 22:06)  POCT Blood Glucose.: 194 mg/dL (27 Apr 2024 19:17)          RADIOLOGY & ADDITIONAL TESTS:    Personally reviewed.     Consultant(s) Notes Reviewed:  [x] YES  [ ] NO

## 2024-04-28 NOTE — OCCUPATIONAL THERAPY INITIAL EVALUATION ADULT - STRENGTHENING, PT EVAL
Pt will improve right UE strength to 2-/5 in order to utilize as a gross stabilizer during ADL tasks in 3-5 OT sessions.

## 2024-04-28 NOTE — PROGRESS NOTE ADULT - SUBJECTIVE AND OBJECTIVE BOX
Interval Events: Yesterday, pt was code stroke, not a candidate for TNK. Overnight, pt was a RRT for worsening NIH score. Repeat CT brain showed moderate-sized evolving acute/subacute L MCA territory infarct with no hemorrhagic conversion.  Brought to ICU for pressure support, Goal >160 systolic. Pt with increasing phenylephrine needs, increased to 0.9.  Pt also had 7 beats of V-tach, asymptomatic. Pt seen and examined at bedside this AM. Pt asleep, lethargic and minimally responsive to stimulation.    Review of Systems:  Constitutional: No fever, chills  Neuro: +weakness. No headache  Resp: No shortness of breath  CVS: No chest pain, palpitations  GI: No abdominal pain    ICU Vital Signs Last 24 Hrs  T(C): 37.5 (28 Apr 2024 05:47), Max: 37.7 (27 Apr 2024 23:46)  T(F): 99.5 (28 Apr 2024 05:47), Max: 99.9 (27 Apr 2024 23:46)  HR: 86 (28 Apr 2024 08:00) (73 - 151)  BP: 156/69 (28 Apr 2024 08:00) (128/90 - 218/105)  BP(mean): 99 (28 Apr 2024 08:00) (96 - 150)  ABP: --  ABP(mean): --  RR: 18 (28 Apr 2024 08:00) (9 - 23)  SpO2: 99% (28 Apr 2024 08:00) (93% - 100%)    O2 Parameters below as of 28 Apr 2024 08:00  Patient On (Oxygen Delivery Method): room air              04-27-24 @ 07:01  -  04-28-24 @ 07:00  --------------------------------------------------------  IN: 1194.3 mL / OUT: 2150 mL / NET: -955.7 mL        CAPILLARY BLOOD GLUCOSE      POCT Blood Glucose.: 165 mg/dL (28 Apr 2024 05:07)      I&O's Summary    27 Apr 2024 07:01  -  28 Apr 2024 07:00  --------------------------------------------------------  IN: 1194.3 mL / OUT: 2150 mL / NET: -955.7 mL        Physical Exam:   Gen:   Neuro:   HEENT:  Resp:  CVS:  Abd:  Ext:  Skin:     Meds:  ciprofloxacin   IVPB IV Intermittent    aMIOdarone Infusion IV Continuous  aMIOdarone Infusion IV Continuous  phenylephrine    Infusion IV Continuous    atorvastatin Oral  dextrose 50% Injectable IV Push  dextrose 50% Injectable IV Push  dextrose Oral Gel Oral PRN  glucagon  Injectable IntraMuscular  insulin lispro (ADMELOG) corrective regimen sliding scale SubCutaneous    albuterol    0.083% Nebulizer PRN    acetaminophen     Tablet .. Oral PRN      aspirin enteric coated Oral  enoxaparin Injectable SubCutaneous      tamsulosin Oral    dextrose 10% Bolus IV Bolus  dextrose 5%. IV Continuous  dextrose 5%. IV Continuous  lactated ringers. IV Continuous                                  13.8   9.20  )-----------( 242      ( 28 Apr 2024 09:00 )             43.6       04-27    142  |  105  |  15  ----------------------------<  174<H>  4.1   |  30  |  1.40<H>    Ca    8.8      27 Apr 2024 14:47  Phos  2.4     04-27  Mg     2.0     04-27    TPro  6.1  /  Alb  2.9<L>  /  TBili  0.5  /  DBili  x   /  AST  23  /  ALT  31  /  AlkPhos  104  04-27      CARDIAC MARKERS ( 27 Apr 2024 10:38 )  x     / x     / 63 U/L / x     / 3.9 ng/mL      PT/INR - ( 27 Apr 2024 10:38 )   PT: 13.3 sec;   INR: 1.14 ratio         PTT - ( 27 Apr 2024 10:38 )  PTT:31.0 sec  Urinalysis Basic - ( 27 Apr 2024 14:47 )    Color: x / Appearance: x / SG: x / pH: x  Gluc: 174 mg/dL / Ketone: x  / Bili: x / Urobili: x   Blood: x / Protein: x / Nitrite: x   Leuk Esterase: x / RBC: x / WBC x   Sq Epi: x / Non Sq Epi: x / Bacteria: x            Radiology:  CT Brain Stroke Protocol (04.27.24 @ 10:26):  IMPRESSION:    CT brain:  No hydrocephalus, acute intracranial hemorrhage, mass effect, or brain   edema.    CT brain perfusion:  Limited by motion.    Cerebral blood flow less than 30% = 8 mL and involves the left lateral   parietal lobe. This represents the core infarct predicted by RAPID.    Tmax greater than 6 seconds = 33 mL and involves the left lateral   parietal lobe. This represents territory at continued ischemic risk in   the presence of neurologic symptoms predicted by RAPID    This mismatch volume: 25 mL.  Mismatch ratio: 4.1.    CTA brain:  Left M3 branch occlusion.  No vascular aneurysm. No AVM.    CTA neck:  No flow-limiting stenosis, evidence for arterial dissection, or vascular   aneurysm.    Dr. Finch discussed these findings with Dr. Davenport on 4/27/2024 10:17   AM with read back.      MR Head No Cont (04.27.24 @ 12:14):  IMPRESSION:    1.  Multiple acute infarcts scattered throughout the bilateral cerebral   and cerebellar hemispheres. Given multiple vascular distributions   involved, an embolic source should be considered.  2.  More dominant acute to subacute infarction in the posterior left MCA   vascular distribution.  3.  Chronic small vessel disease.    CT Head No Cont (04.27.24 @ 19:52):  IMPRESSION:  Moderate-sized evolving acute/subacute infarct in the left middle   cerebral artery vascular territory, centered in the ventral left parietal   convexity, with extension into the posterior aspect of the left insula.    Numerous additional punctate acute/subacute infarcts scattered throughout   the cerebral hemispheres and cerebellar hemispheres bilaterally on MRI of   04/27/2024 are not well visualized by CT technique.    No hemorrhagic conversion.      Tubes/Lines: +Peripheral IV       GLOBAL ISSUE/BEST PRACTICE:  Analgesia: N  Sedation: N  HOB elevation: Y  Stress ulcer prophylaxis: N  VTE prophylaxis: Y  Glycemic control: Y  Nutrition: N    CODE STATUS: Full Code     Interval Events: Yesterday, pt was code stroke, not a candidate for TNK. Overnight, pt was a RRT for worsening NIH score. Repeat CT brain showed moderate-sized evolving acute/subacute L MCA territory infarct with no hemorrhagic conversion.  Brought to ICU for pressure support, Goal >160 systolic. Pt with increasing phenylephrine needs, increased to 0.9.  Pt also had 7 beats of V-tach, asymptomatic. Pt seen and examined at bedside this AM. Pt asleep, lethargic and minimally responsive to stimulation.    Review of Systems:  Constitutional: No fever, chills  Neuro: +weakness. No headache  Resp: No shortness of breath  CVS: No chest pain, palpitations  GI: No abdominal pain    ICU Vital Signs Last 24 Hrs  T(C): 37.5 (28 Apr 2024 05:47), Max: 37.7 (27 Apr 2024 23:46)  T(F): 99.5 (28 Apr 2024 05:47), Max: 99.9 (27 Apr 2024 23:46)  HR: 86 (28 Apr 2024 08:00) (73 - 151)  BP: 156/69 (28 Apr 2024 08:00) (128/90 - 218/105)  BP(mean): 99 (28 Apr 2024 08:00) (96 - 150)  ABP: --  ABP(mean): --  RR: 18 (28 Apr 2024 08:00) (9 - 23)  SpO2: 99% (28 Apr 2024 08:00) (93% - 100%)    O2 Parameters below as of 28 Apr 2024 08:00  Patient On (Oxygen Delivery Method): room air              04-27-24 @ 07:01  -  04-28-24 @ 07:00  --------------------------------------------------------  IN: 1194.3 mL / OUT: 2150 mL / NET: -955.7 mL        CAPILLARY BLOOD GLUCOSE      POCT Blood Glucose.: 165 mg/dL (28 Apr 2024 05:07)      I&O's Summary    27 Apr 2024 07:01  -  28 Apr 2024 07:00  --------------------------------------------------------  IN: 1194.3 mL / OUT: 2150 mL / NET: -955.7 mL        Physical Exam:   Gen: Somnolent. Laying in bed comfortably  Neuro: R-sided hemiparesis, PERRLA. Lethargic but awakens to verbal and tactile stimuli. Responds to questions  HEENT: NC/AT  Resp: CTA B/L. No accessory muscle use. No wheezing.  CVS: RRR. +S1/S2. No murmurs  Abd: Soft. NT/ND. +BS  Ext: No edema. +Chronic R big toe wound      Meds:  ciprofloxacin   IVPB IV Intermittent    aMIOdarone Infusion IV Continuous  aMIOdarone Infusion IV Continuous  phenylephrine    Infusion IV Continuous    atorvastatin Oral  dextrose 50% Injectable IV Push  dextrose 50% Injectable IV Push  dextrose Oral Gel Oral PRN  glucagon  Injectable IntraMuscular  insulin lispro (ADMELOG) corrective regimen sliding scale SubCutaneous    albuterol    0.083% Nebulizer PRN    acetaminophen     Tablet .. Oral PRN      aspirin enteric coated Oral  enoxaparin Injectable SubCutaneous      tamsulosin Oral    dextrose 10% Bolus IV Bolus  dextrose 5%. IV Continuous  dextrose 5%. IV Continuous  lactated ringers. IV Continuous                                  13.8   9.20  )-----------( 242      ( 28 Apr 2024 09:00 )             43.6       04-27    142  |  105  |  15  ----------------------------<  174<H>  4.1   |  30  |  1.40<H>    Ca    8.8      27 Apr 2024 14:47  Phos  2.4     04-27  Mg     2.0     04-27    TPro  6.1  /  Alb  2.9<L>  /  TBili  0.5  /  DBili  x   /  AST  23  /  ALT  31  /  AlkPhos  104  04-27      CARDIAC MARKERS ( 27 Apr 2024 10:38 )  x     / x     / 63 U/L / x     / 3.9 ng/mL      PT/INR - ( 27 Apr 2024 10:38 )   PT: 13.3 sec;   INR: 1.14 ratio         PTT - ( 27 Apr 2024 10:38 )  PTT:31.0 sec  Urinalysis Basic - ( 27 Apr 2024 14:47 )    Color: x / Appearance: x / SG: x / pH: x  Gluc: 174 mg/dL / Ketone: x  / Bili: x / Urobili: x   Blood: x / Protein: x / Nitrite: x   Leuk Esterase: x / RBC: x / WBC x   Sq Epi: x / Non Sq Epi: x / Bacteria: x            Radiology:  CT Brain Stroke Protocol (04.27.24 @ 10:26):  IMPRESSION:    CT brain:  No hydrocephalus, acute intracranial hemorrhage, mass effect, or brain   edema.    CT brain perfusion:  Limited by motion.    Cerebral blood flow less than 30% = 8 mL and involves the left lateral   parietal lobe. This represents the core infarct predicted by RAPID.    Tmax greater than 6 seconds = 33 mL and involves the left lateral   parietal lobe. This represents territory at continued ischemic risk in   the presence of neurologic symptoms predicted by RAPID    This mismatch volume: 25 mL.  Mismatch ratio: 4.1.    CTA brain:  Left M3 branch occlusion.  No vascular aneurysm. No AVM.    CTA neck:  No flow-limiting stenosis, evidence for arterial dissection, or vascular   aneurysm.    Dr. Finch discussed these findings with Dr. Davenport on 4/27/2024 10:17   AM with read back.      MR Head No Cont (04.27.24 @ 12:14):  IMPRESSION:    1.  Multiple acute infarcts scattered throughout the bilateral cerebral   and cerebellar hemispheres. Given multiple vascular distributions   involved, an embolic source should be considered.  2.  More dominant acute to subacute infarction in the posterior left MCA   vascular distribution.  3.  Chronic small vessel disease.    CT Head No Cont (04.27.24 @ 19:52):  IMPRESSION:  Moderate-sized evolving acute/subacute infarct in the left middle   cerebral artery vascular territory, centered in the ventral left parietal   convexity, with extension into the posterior aspect of the left insula.    Numerous additional punctate acute/subacute infarcts scattered throughout   the cerebral hemispheres and cerebellar hemispheres bilaterally on MRI of   04/27/2024 are not well visualized by CT technique.    No hemorrhagic conversion.      Tubes/Lines: +Peripheral IV       GLOBAL ISSUE/BEST PRACTICE:  Analgesia: N  Sedation: N  HOB elevation: Y  Stress ulcer prophylaxis: N  VTE prophylaxis: Y  Glycemic control: Y  Nutrition: N    CODE STATUS: Full Code

## 2024-04-28 NOTE — PROGRESS NOTE ADULT - ASSESSMENT
83 year old male with a PMH of eosinophilic asthma/COPD, former smoker, chronic hypoxemic and hypercapnic respiratory failure on nocturnal BiPAP, afib on Eliquis, CAD s/p CABG and PCI, HTN, HLD, DM2, L femur fracture with chronic OM who presented to the ED from home with complaints of SOB and tachycardia.      Neuro:   - MRI from earlier confirmed multiple acute infarcts throughout the bilateral cerebral and cerebellar hemispheres likely embolic.  Also with a more dominant acute to subacute infarct in the posterior L MCA distribution.    - No hemorrhagic conversion on CT brain   - C/w phenylephrine to maintain SBP >160  - c/w ASA suppository, hold home statin as pt NPO    Cardio:  - c/w amio gtt for afib, Currently NSR  - C/w phenylephrine to maintain SBP >160  - BB on hold for permissive hypertension    Pulm:   - Hx COPD, COURTNEY, Chronic hypoxemic/hypercapnic respiratory failure. c/w nocturnal BiPAP   - PRN duonebs    GI:  - NPO, failed bedside dysphagia. F/u S&S recs  - Wife declined NGT     Renal:   - No acute needs  - Replete lytes PRN    ID:   - c/w home cipro for suppression of chronic osteomyelitis.  - Local wound care for chronic R big toe wound     Endocrine:   - LDISS q6h while NPO.     Heme:   - Full AC d/c'ed 2/2 bleeding risk per neuro recs  - Start heparin 5000U q8h for AC and DVT ppx    #GOC: Full Code   83 year old male with a PMH of eosinophilic asthma/COPD, former smoker, chronic hypoxemic and hypercapnic respiratory failure on nocturnal BiPAP, afib on Eliquis, CAD s/p CABG and PCI, HTN, HLD, DM2, L femur fracture with chronic OM who presented to the ED from home with complaints of SOB and tachycardia.      Neuro:   - MRI from earlier confirmed multiple acute infarcts throughout the bilateral cerebral and cerebellar hemispheres likely embolic.  Also with a more dominant acute to subacute infarct in the posterior L MCA distribution.    - No hemorrhagic conversion on CT brain   - C/w phenylephrine to maintain SBP >160  - c/w ASA suppository, hold home statin as pt NPO    Cardio:  - c/w amio gtt for afib, Currently NSR  - Episode of 7 beats vtach overnight, asymptomatic. Continue to monitor on tele  - C/w phenylephrine to maintain SBP >160  - BB on hold for permissive hypertension    Pulm:   - Hx COPD, COURTNEY, Chronic hypoxemic/hypercapnic respiratory failure. c/w nocturnal BiPAP   - PRN duonebs    GI:  - NPO, failed bedside dysphagia. F/u S&S recs  - Wife declined NGT     Renal:   - No acute needs  - Replete lytes PRN    ID:   - c/w home cipro for suppression of chronic osteomyelitis.  - Local wound care for chronic R big toe wound     Endocrine:   - LDISS q6h while NPO.     Heme:   - Full AC d/c'ed 2/2 bleeding risk per neuro recs  - Start heparin 5000U q8h for AC and DVT ppx    #GOC: Full Code   83 year old male with a PMH of eosinophilic asthma/COPD, former smoker, chronic hypoxemic and hypercapnic respiratory failure on nocturnal BiPAP, afib on Eliquis, CAD s/p CABG and PCI, HTN, HLD, DM2, L femur fracture with chronic OM who presented to the ED from home with complaints of SOB and tachycardia.      Neuro:   - MRI from earlier confirmed multiple acute infarcts throughout the bilateral cerebral and cerebellar hemispheres likely embolic.  Also with a more dominant acute to subacute infarct in the posterior L MCA distribution.    - No hemorrhagic conversion on CT brain   - C/w phenylephrine to maintain SBP >160  - c/w ASA suppository, hold home statin as pt NPO    Cardio:  - c/w amio gtt for afib, Currently NSR  - Episode of 7 beats vtach overnight, asymptomatic. Continue to monitor on tele  - C/w phenylephrine to maintain SBP >160  - BB on hold for permissive hypertension    Pulm:   - Hx COPD, COURTNEY, Chronic hypoxemic/hypercapnic respiratory failure. c/w nocturnal BiPAP   - PRN duonebs    GI:  - NPO, failed bedside dysphagia. F/u S&S recs  - Wife declined NGT     Renal:   - No acute needs  - Replete lytes PRN    ID:   - c/w home cipro for suppression of chronic osteomyelitis.  - Local wound care for chronic R big toe wound     Endocrine:   - LDISS q6h while NPO.     Heme:   - Full AC d/c'ed 2/2 bleeding risk per neuro recs  - Start heparin 5000U q8h for AC and DVT ppx    #GOC: Full Code    Wife updated at bedside by Dr. Babb

## 2024-04-29 NOTE — PROGRESS NOTE ADULT - PROBLEM SELECTOR PLAN 5
MERRILL on admission. Baseline Cr around 1.1  - On admission Cr 1.6  - Continue IVF  - Avoid nephrotoxics - hold home furosemide  - F/u BMP in AM  - Plan per ICU

## 2024-04-29 NOTE — PROGRESS NOTE ADULT - PROBLEM SELECTOR PLAN 4
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  - R big toe dressing c/d/i  - Start Cipro IV as patient NPO   - Per podiatry, nothing to culture at this time   - Continue local wound care   - f/u MRSA/MSSA PCR  - ID following   - Podiatry following  - Plan per ICU

## 2024-04-29 NOTE — PROGRESS NOTE ADULT - ATTENDING COMMENTS
82 yo man with Hx eosinophilic asthma/COPD, chronic hypoxemic and hypercapnic respiratory failure, afib on eliquis, CAD, DM2, L femur Fx with chronic osto, admitted with uncontrolled afib, course complicated by acute L MCA CVA resulting in R hemiparesis and dysarthria.  Not a TNK candidate as he was on full AC.    --posterior L MCA CVA with multiple smaller acute b/l cerebral and cerebellar strokes  continue ASA  no AC yet due to large territory and risk of hemorrhage  continue induced htn with phenylephrine -180  exam with neurology slightly improved today  PT/OT/speech/swallow evals  --afib controlled on amio  --asthma, continue inhaled steroids, bronchodilators  chronic hypercapnic respiratory failure, continue BiPAP at night or with sleep  --CKD stable  d/c IVF  --dysphagia, will likely need NGT, will discuss with pt  --no infectious concerns  --DM2 controlled off meds  --plan discussed with wife

## 2024-04-29 NOTE — PROGRESS NOTE ADULT - ASSESSMENT
83-year-old M with history of COPD on home O2 PRN, coronary artery disease s/p CABG,  hypertension, diabetes, hyperlipidemia, atrial fibrillation on Eliquis as well as chronic osteomyelitis who presents to the emergency department at [facility] complaining of rapid heart rate. Cardiology consulted for afib with rvr.    - Presenting with PAF with RVR  - At home he had noted some SOB, his wife checked his HR and noted it up was up to 160s so brought him to the ER, then placed on Cardizem gtt, had missed home BB   - on the morning of 4/27, patient was noted to have new onset aphasia and a code stroke was called.  He was deemed not a candidate for TNK as he is on AC.  He was found to have a L M3 occlusion but was deemed not a candidate for thrombectomy as occlusion too distal.  Later that night, a RRT was called due to worsening NIH score noted by nursing.  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   - Continue phenylephrine to maintain elevated MAP to optimize cerebral perfusion  - Now back in NSR.  On amio gtt.  Family had previously refused amio  - Was on full dose ac with lovenox, though held because of risk of hemorrhagic conversion. Patient reports that he has missed doses of Eliquis at home.  Would not deem this eliquis failure  - BB on hold for permissive hypertension, though can restart if neuro ok with it.  - Continue aspirin  - Continue statin drug  - Continue telemetry  - Monitor and replete electrolytes. Keep K>4.0 and Mg>2.0.  - remains on yolande, with high risk of decompensation.

## 2024-04-29 NOTE — PROGRESS NOTE ADULT - PROBLEM SELECTOR PLAN 9
Chronic, baseline 2L home O2 and BiPAP qHS  - CXR wet read: no gross abnormalities  - Satting well on RA   - Hold home montelukast as NPO  - Continue Bipap overnight   - Pulm Dr. Combs consulted  - Plan per ICU

## 2024-04-29 NOTE — CONSULT NOTE ADULT - ASSESSMENT
Physical Exam:   Vital Signs Last 24 Hrs  T(C): 37.4 (29 Apr 2024 07:49), Max: 37.8 (29 Apr 2024 00:07)  T(F): 99.3 (29 Apr 2024 07:49), Max: 100 (29 Apr 2024 00:07)  HR: 65 (29 Apr 2024 11:00) (65 - 99)  BP: 176/83 (29 Apr 2024 11:00) (148/70 - 206/107)  BP(mean): 119 (29 Apr 2024 11:00) (98 - 141)  RR: 19 (29 Apr 2024 11:00) (16 - 28)  SpO2: 96% (29 Apr 2024 11:00) (95% - 100%)    Parameters below as of 28 Apr 2024 23:00  Patient On (Oxygen Delivery Method): BiPAP/CPAP             CAPILLARY BLOOD GLUCOSE      POCT Blood Glucose.: 175 mg/dL (29 Apr 2024 11:14)  POCT Blood Glucose.: 150 mg/dL (29 Apr 2024 05:03)  POCT Blood Glucose.: 148 mg/dL (28 Apr 2024 23:22)  POCT Blood Glucose.: 156 mg/dL (28 Apr 2024 17:30)      Cholesterol, Serum: 113 mg/dL (05.19.21 @ 08:36)     HDL Cholesterol, Serum: 22 mg/dL (05.19.21 @ 08:36)     LDL Cholesterol Calculated: 66 mg/dL (05.19.21 @ 08:36)     DIET: CC

## 2024-04-29 NOTE — PROGRESS NOTE ADULT - PROBLEM SELECTOR PLAN 8
Chronic, on home insulin (Lantus 10u)  - NPO   - hold home insulin  - start low dose sliding scale  - hypoglycemia protocol in place, fingersticks q ac, q hs   - goal -180 in hospital setting, adjust insulin regimen prn  - HbA1c 6.8  - Plan per ICU

## 2024-04-29 NOTE — PROGRESS NOTE ADULT - ASSESSMENT
83 year old male with a PMH of eosinophilic asthma/COPD, former smoker, chronic hypoxemic and hypercapnic respiratory failure on nocturnal BiPAP, afib on Eliquis, CAD s/p CABG and PCI, HTN, HLD, DM2, L femur fracture with chronic OM who presented to the ED from home with complaints of SOB and tachycardia.      Neuro:   - s/p L MCA ischemic stroke w/ b/l embolic infarcts with no hemorrhagic conversion  - c/w phenylephrine to maintain SBP >160  - c/w ASA suppository, hold home statin as pt NPO  - Repeat CT per neuro recs    Cardio:  - c/w amio gtt for afib, Currently NSR  - c/w phenylephrine to maintain SBP >160  - BB on hold for permissive HTN    Pulm:   - Hx COPD, COURTNEY, c/w nocturnal BiPAP   - PRN duonebs    GI:  - NPO, failed bedside dysphagia. F/u S&S recs  - Wife declined NGT, will need to re-discuss if pt fails S&S    Renal:   - No acute needs  - Replete lytes PRN    ID:   - c/w home cipro for suppression of chronic osteomyelitis  - Local wound care for chronic R big toe wound     Endocrine:   - LDISS q6h while NPO.     Heme:   - c/w HSQ q8h per neuro recs 2/2 hemorrhagic conversion risk with full AC    #GOC: Full Code   83 year old male with a PMH of eosinophilic asthma/COPD, former smoker, chronic hypoxemic and hypercapnic respiratory failure on nocturnal BiPAP, afib on Eliquis, CAD s/p CABG and PCI, HTN, HLD, DM2, L femur fracture with chronic OM who presented to the ED from home with complaints of SOB and tachycardia.      Neuro:   - s/p L MCA ischemic stroke w/ b/l embolic infarcts with no hemorrhagic conversion  - c/w phenylephrine to maintain -180  - c/w ASA suppository, hold home statin as pt NPO  - Repeat CT per neuro recs    Cardio:  - c/w amio gtt for afib, Currently NSR  - c/w phenylephrine to maintain SBP >160  - BB on hold for permissive HTN    Pulm:   - Hx COPD, COURTNEY, c/w nocturnal BiPAP   - Standing duonebs, budesonide    GI:  - NPO, failed bedside dysphagia and formal swallow eval  - Wife declined NGT, will need to re-discuss     Renal:   - No acute needs  - Replete lytes PRN    ID:   - c/w home cipro for suppression of chronic osteomyelitis  - Local wound care for chronic R big toe wound     Endocrine:   - LDISS q6h while NPO.     Heme:   - c/w HSQ q8h per neuro recs 2/2 hemorrhagic conversion risk with full AC    #GOC: Full Code   83 year old male with a PMH of eosinophilic asthma/COPD, former smoker, chronic hypoxemic and hypercapnic respiratory failure on nocturnal BiPAP, afib on Eliquis, CAD s/p CABG and PCI, HTN, HLD, DM2, L femur fracture with chronic OM who presented to the ED from home with complaints of SOB and tachycardia.      Neuro:   - s/p L MCA ischemic stroke w/ b/l embolic infarcts with no hemorrhagic conversion  - c/w phenylephrine to maintain -180  - c/w ASA suppository, hold home statin as pt NPO  - Will repeat CT when neuro recs  - c/w PT/OT, failed bedside swallow    Cardio:  - c/w amio gtt for afib, Currently NSR  - c/w phenylephrine to maintain SBP >160  - BB on hold for permissive HTN    Pulm:   - Hx COPD, COURTNEY, c/w nocturnal BiPAP   - Standing duonebs, budesonide    GI:  - NPO, failed bedside dysphagia and formal swallow eval  - Wife declined NGT, will need to re-discuss     Renal:   - No acute needs  - Replete lytes PRN    ID:   - c/w home cipro for suppression of chronic osteomyelitis  - Local wound care for chronic R big toe wound     Endocrine:   - LDISS q6h while NPO    Heme:   - c/w HSQ q8h per neuro recs 2/2 hemorrhagic conversion risk with full AC    #GOC: Full Code   83 year old male with a PMH of eosinophilic asthma/COPD, former smoker, chronic hypoxemic and hypercapnic respiratory failure on nocturnal BiPAP, afib on Eliquis, CAD s/p CABG and PCI, HTN, HLD, DM2, L femur fracture with chronic OM who presented to the ED from home with complaints of SOB and tachycardia.      Neuro:   - s/p L MCA ischemic stroke w/ b/l embolic infarcts with no hemorrhagic conversion  - c/w phenylephrine to maintain -180  - c/w ASA suppository, hold home statin as pt NPO  - Will repeat CT when neuro recs  - c/w PT/OT, failed bedside swallow    Cardio:  - c/w amio gtt for afib, Currently NSR  - c/w phenylephrine to maintain SBP >160  - BB on hold for permissive HTN    Pulm:   - Hx COPD, COURTNEY, c/w nocturnal BiPAP   - Standing duonebs, budesonide    GI:  - NPO, failed bedside dysphagia and formal swallow eval  - Wife declined NGT, will need to re-discuss     Renal:   - No acute needs  - Replete lytes PRN    ID:   - c/w home cipro for suppression of chronic osteomyelitis  - Local wound care for chronic R big toe wound     Endocrine:   - LDISS q6h while NPO    Heme:   - c/w HSQ q8h per neuro recs 2/2 hemorrhagic conversion risk with full AC    #GOC: Full Code    Wife updated at bedside by Dr. Sidhu and Dr. Dwyer

## 2024-04-29 NOTE — PROGRESS NOTE ADULT - SUBJECTIVE AND OBJECTIVE BOX
Interval Events: Pt seen and examined at bedside. No acute events overnight. Pt more awake and conversive today. Endorses that he is talking differently, and is eager to work with PT/OT to regain strength back to feel like himself. Neftali titrated to meet BP goal of >160SBP, currently running at 0.9. Pt also remains in NSR on amio gtt.     Review of Systems:  Constitutional: No fever  Neuro: + weakness. No headache  Resp: No shortness of breath  CVS: No chest pain, palpitations  GI: No abdominal pain, n/v/d/c      ICU Vital Signs Last 24 Hrs  T(C): 37.4 (29 Apr 2024 07:49), Max: 37.8 (29 Apr 2024 00:07)  T(F): 99.3 (29 Apr 2024 07:49), Max: 100 (29 Apr 2024 00:07)  HR: 76 (29 Apr 2024 07:00) (65 - 99)  BP: 169/78 (29 Apr 2024 07:00) (148/70 - 206/107)  BP(mean): 112 (29 Apr 2024 07:00) (98 - 141)  ABP: --  ABP(mean): --  RR: 19 (29 Apr 2024 07:00) (16 - 28)  SpO2: 97% (29 Apr 2024 07:00) (95% - 100%)    O2 Parameters below as of 28 Apr 2024 23:00  Patient On (Oxygen Delivery Method): BiPAP/CPAP              04-28-24 @ 07:01  -  04-29-24 @ 07:00  --------------------------------------------------------  IN: 4324.1 mL / OUT: 2100 mL / NET: 2224.1 mL        CAPILLARY BLOOD GLUCOSE      POCT Blood Glucose.: 150 mg/dL (29 Apr 2024 05:03)      I&O's Summary    28 Apr 2024 07:01  -  29 Apr 2024 07:00  --------------------------------------------------------  IN: 4324.1 mL / OUT: 2100 mL / NET: 2224.1 mL        Physical Exam:   Gen: Awake, conversive. Laying in bed comfortably  Neuro: +R-sided hemiparesis in b/l upper and lower extremities. +aphasia. PERRLA.   HEENT: NC/AT, +L cataract  Resp: CTA B/L. No accessory muscle use. No wheezing.  CVS: RRR. +S1/S2. No murmurs  Abd: Soft. NT/ND. +BS  Ext: No edema. +Chronic R big toe wound w/ associated desquamation of skin, no erythema or drainage  MSK: Strength 0/5 in RUE and RLE    Meds:  ciprofloxacin   IVPB IV Intermittent    aMIOdarone Infusion IV Continuous  phenylephrine    Infusion IV Continuous    dextrose 50% Injectable IV Push  dextrose 50% Injectable IV Push  insulin lispro (ADMELOG) corrective regimen sliding scale SubCutaneous    albuterol/ipratropium for Nebulization Nebulizer PRN    aspirin Suppository Rectal      heparin   Injectable SubCutaneous        dextrose 10% Bolus IV Bolus  dextrose 5%. IV Continuous  dextrose 5%. IV Continuous  lactated ringers. IV Continuous                                  13.0   8.57  )-----------( 207      ( 29 Apr 2024 06:08 )             41.5       04-28    143  |  108  |  12  ----------------------------<  170<H>  4.1   |  30  |  1.50<H>    Ca    9.6      28 Apr 2024 09:00  Phos  2.4     04-28  Mg     1.7     04-28    TPro  6.5  /  Alb  3.0<L>  /  TBili  0.6  /  DBili  x   /  AST  19  /  ALT  24  /  AlkPhos  102  04-28      CARDIAC MARKERS ( 27 Apr 2024 10:38 )  x     / x     / 63 U/L / x     / 3.9 ng/mL      PT/INR - ( 27 Apr 2024 10:38 )   PT: 13.3 sec;   INR: 1.14 ratio         PTT - ( 27 Apr 2024 10:38 )  PTT:31.0 sec  Urinalysis Basic - ( 28 Apr 2024 09:00 )    Color: x / Appearance: x / SG: x / pH: x  Gluc: 170 mg/dL / Ketone: x  / Bili: x / Urobili: x   Blood: x / Protein: x / Nitrite: x   Leuk Esterase: x / RBC: x / WBC x   Sq Epi: x / Non Sq Epi: x / Bacteria: x      Tubes/Lines: +Peripheral IV       GLOBAL ISSUE/BEST PRACTICE:  Analgesia: N  Sedation: N  HOB elevation: Y  Stress ulcer prophylaxis: N  VTE prophylaxis: Y  Glycemic control: Y  Nutrition: N    CODE STATUS: Full code       Interval Events: Pt seen and examined at bedside. No acute events overnight. Pt more awake and conversive today. Endorses that he is talking differently, and is eager to work with PT/OT to regain strength back to feel like himself. Neftali titrated to meet BP goal of >160SBP, currently running at 0.9. Pt also remains in NSR on amio gtt.     Review of Systems:  Constitutional: No fever  Neuro: + weakness. No headache  Resp: No shortness of breath  CVS: No chest pain, palpitations  GI: No abdominal pain, n/v/d/c      ICU Vital Signs Last 24 Hrs  T(C): 37.4 (29 Apr 2024 07:49), Max: 37.8 (29 Apr 2024 00:07)  T(F): 99.3 (29 Apr 2024 07:49), Max: 100 (29 Apr 2024 00:07)  HR: 76 (29 Apr 2024 07:00) (65 - 99)  BP: 169/78 (29 Apr 2024 07:00) (148/70 - 206/107)  BP(mean): 112 (29 Apr 2024 07:00) (98 - 141)  ABP: --  ABP(mean): --  RR: 19 (29 Apr 2024 07:00) (16 - 28)  SpO2: 97% (29 Apr 2024 07:00) (95% - 100%)    O2 Parameters below as of 28 Apr 2024 23:00  Patient On (Oxygen Delivery Method): BiPAP/CPAP              04-28-24 @ 07:01  -  04-29-24 @ 07:00  --------------------------------------------------------  IN: 4324.1 mL / OUT: 2100 mL / NET: 2224.1 mL        CAPILLARY BLOOD GLUCOSE      POCT Blood Glucose.: 150 mg/dL (29 Apr 2024 05:03)      I&O's Summary    28 Apr 2024 07:01  -  29 Apr 2024 07:00  --------------------------------------------------------  IN: 4324.1 mL / OUT: 2100 mL / NET: 2224.1 mL        Physical Exam:   Gen: Awake, conversive. Laying in bed comfortably  Neuro: +R-sided hemiparesis in b/l upper and lower extremities. +aphasia and dysarthria. PERRLA.   HEENT: NC/AT, +L cataract  Resp: CTA B/L. No accessory muscle use. No wheezing.  CVS: RRR. +S1/S2. No murmurs  Abd: Soft. NT/ND. +BS  Ext: No edema. +Chronic R big toe wound w/ associated desquamation of skin, no erythema or drainage  MSK: Strength 0/5 in RUE and RLE    Meds:  ciprofloxacin   IVPB IV Intermittent    aMIOdarone Infusion IV Continuous  phenylephrine    Infusion IV Continuous    dextrose 50% Injectable IV Push  dextrose 50% Injectable IV Push  insulin lispro (ADMELOG) corrective regimen sliding scale SubCutaneous    albuterol/ipratropium for Nebulization Nebulizer PRN    aspirin Suppository Rectal      heparin   Injectable SubCutaneous        dextrose 10% Bolus IV Bolus  dextrose 5%. IV Continuous  dextrose 5%. IV Continuous  lactated ringers. IV Continuous                                  13.0   8.57  )-----------( 207      ( 29 Apr 2024 06:08 )             41.5       04-28    143  |  108  |  12  ----------------------------<  170<H>  4.1   |  30  |  1.50<H>    Ca    9.6      28 Apr 2024 09:00  Phos  2.4     04-28  Mg     1.7     04-28    TPro  6.5  /  Alb  3.0<L>  /  TBili  0.6  /  DBili  x   /  AST  19  /  ALT  24  /  AlkPhos  102  04-28      CARDIAC MARKERS ( 27 Apr 2024 10:38 )  x     / x     / 63 U/L / x     / 3.9 ng/mL      PT/INR - ( 27 Apr 2024 10:38 )   PT: 13.3 sec;   INR: 1.14 ratio         PTT - ( 27 Apr 2024 10:38 )  PTT:31.0 sec  Urinalysis Basic - ( 28 Apr 2024 09:00 )    Color: x / Appearance: x / SG: x / pH: x  Gluc: 170 mg/dL / Ketone: x  / Bili: x / Urobili: x   Blood: x / Protein: x / Nitrite: x   Leuk Esterase: x / RBC: x / WBC x   Sq Epi: x / Non Sq Epi: x / Bacteria: x      < from: MR Head No Cont (04.27.24 @ 12:14) >  IMPRESSION:    1.  Multiple acute infarcts scattered throughout the bilateral cerebral   and cerebellar hemispheres. Given multiple vascular distributions   involved, an embolic source should be considered.  2.  More dominant acute to subacute infarction in the posterior left MCA   vascular distribution.  3.  Chronic small vessel disease.    --- End of Report ---      < end of copied text >      Tubes/Lines: +Peripheral IV       GLOBAL ISSUE/BEST PRACTICE:  Analgesia: N  Sedation: N  HOB elevation: Y  Stress ulcer prophylaxis: N  VTE prophylaxis: Y  Glycemic control: Y  Nutrition: N    CODE STATUS: Full code

## 2024-04-29 NOTE — PROGRESS NOTE ADULT - PROBLEM SELECTOR PLAN 10
#VTE ppx: Heparin subq  #GI ppx: Not indicated  #Diet: Diet, NPO:    Special Instructions for Nursing:  CODE STROKE; NPO until Dysphagia screen or Speech Bedside Swallow Evaluation completed and passed (04-27-24 @ 09:56) [Active]  #Activity: Activity - Increase As Tolerated:     Time/Priority:  Routine (04-27-24 @ 22:51) [Active]  #ACP: Full code  #Dispo: further plans pending clinical course

## 2024-04-29 NOTE — PROGRESS NOTE ADULT - SUBJECTIVE AND OBJECTIVE BOX
Neurology Follow up note    YUE [id number]    HPI:  83yoM PMHx AFib on Eliquis, CAD, HTN, DM, HLD, COPD, osteomyelitis presents c/o shortness of breath, chest discomfort. Pt reports onset of rapid heart rate this morning, HR measured 160s when he checked his pulse ox. Also endorses dyspnea exacerbated by movement. Pt reports last episode of AFib in 2020, no episodes since then until today's presentation. Pt states that chest discomfort feels like chest pressure, no chest pain. Pt also reports chronic R big toe wound for the past few months, follows w/ Wound Care. States that he has increased sensitivity to R sole of foot, but denies pain, numbness/tingling. Denies fevers, chills, abdominal pain, N/V/D/C.     IN THE ED:  Temp 98  F ,  , /81  ,RR 18 , SpO2 94%  S/P PO , IV diltiazem 10mg x2, IV diltiazem gtt @ 10 mg/hr  Labs significant for BUN/Cr 25/1.6, troponin 507.9, pBNP 355  Imaging:   CXR wet read: no gross abnormalities (25 Apr 2024 12:13)      Interval History -more responsive today    Patient is seen, chart was reviewed and case was discussed with the treatment team.  Pt is not in any distress.   Lying on bed comfortably.   No events reported overnight.   .    Vital Signs Last 24 Hrs  T(C): 37.4 (29 Apr 2024 07:49), Max: 37.8 (29 Apr 2024 00:07)  T(F): 99.3 (29 Apr 2024 07:49), Max: 100 (29 Apr 2024 00:07)  HR: 71 (29 Apr 2024 12:30) (65 - 99)  BP: 174/79 (29 Apr 2024 12:30) (146/70 - 206/107)  BP(mean): 113 (29 Apr 2024 12:30) (98 - 141)  RR: 20 (29 Apr 2024 12:30) (16 - 29)  SpO2: 98% (29 Apr 2024 12:30) (95% - 100%)    Parameters below as of 28 Apr 2024 23:00  Patient On (Oxygen Delivery Method): BiPAP/CPAP            REVIEW OF SYSTEMS:    Constitutional: No fever, weight loss or fatigue  Eyes: No eye pain, visual disturbances, or discharge  ENT:  No difficulty hearing, tinnitus, vertigo; No sinus or throat pain  Neck: No pain or stiffness  Respiratory: No wheezing, chills or hemoptysis  Cardiovascular: No chest pain, palpitations, shortness of breath, dizziness  Gastrointestinal: No abdominal or epigastric pain. No nausea, vomiting or hematemesis  Genitourinary: No dysuria, frequency, hematuria or incontinence  Neurological: No headaches, numbness or tremors  Psychiatric: No depression, anxiety, mood swings or difficulty sleeping  Musculoskeletal: No joint pain or swelling; No muscle, back or extremity pain  Skin: No itching, burning, rashes or lesions   Lymph Nodes: No enlarged glands  Endocrine: No heat or cold intolerance; No hair loss,   Allergy and Immunologic: No hives or eczema    On Neurological Examination:    Mental Status - Pt is alert, awake, oriented X3.. Follows commands well and able to answer questions appropriately.Mood and affect  normal    Speech -  MIXED APHASIA    Cranial Nerves - Pupils 3 mm equal and reactive to light, extraocular eye movements intact. Pt has no visual field deficit.  Pt has right facial weakness  . Facial sensation is intact.Tongue - is in midline.    Muscle tone - is decreased on right  .      Motor Exam -right hemiparesis; RUE 1/5; LE 1-2/5   No shaking or tremors.    Sensory Exam -. Pt withdraws all extremities equally on stimulation. No asymmetry seen. No complaints of tingling, numbness.        coordination:    Finger to nose: normal-left        Deep tendon Reflexes - 2 plus all over.            Neck Supple -  Yes.     MEDICATIONS    albuterol/ipratropium for Nebulization 3 milliLiter(s) Nebulizer every 6 hours  aMIOdarone Infusion 0.5 mG/Min IV Continuous <Continuous>  aspirin Suppository 300 milliGRAM(s) Rectal daily  buDESOnide    Inhalation Suspension 0.5 milliGRAM(s) Inhalation two times a day  ciprofloxacin   IVPB 400 milliGRAM(s) IV Intermittent every 12 hours  dextrose 10% Bolus 125 milliLiter(s) IV Bolus once  dextrose 5%. 1000 milliLiter(s) IV Continuous <Continuous>  dextrose 5%. 1000 milliLiter(s) IV Continuous <Continuous>  dextrose 50% Injectable 25 Gram(s) IV Push once  dextrose 50% Injectable 12.5 Gram(s) IV Push once  heparin   Injectable 5000 Unit(s) SubCutaneous every 8 hours  insulin lispro (ADMELOG) corrective regimen sliding scale   SubCutaneous every 6 hours  magnesium sulfate  IVPB 2 Gram(s) IV Intermittent once  phenylephrine    Infusion 0.2 MICROgram(s)/kG/Min IV Continuous <Continuous>      Allergies    No Known Allergies    Intolerances        LABS:  CBC Full  -  ( 29 Apr 2024 06:08 )  WBC Count : 8.57 K/uL  RBC Count : 4.84 M/uL  Hemoglobin : 13.0 g/dL  Hematocrit : 41.5 %  Platelet Count - Automated : 207 K/uL  Mean Cell Volume : 85.7 fl  Mean Cell Hemoglobin : 26.9 pg  Mean Cell Hemoglobin Concentration : 31.3 gm/dL  Auto Neutrophil # : 5.96 K/uL  Auto Lymphocyte # : 1.19 K/uL   %    Urinalysis Basic - ( 29 Apr 2024 06:08 )    Color: x / Appearance: x / SG: x / pH: x  Gluc: 196 mg/dL / Ketone: x  / Bili: x / Urobili: x   Blood: x / Protein: x / Nitrite: x   Leuk Esterase: x / RBC: x / WBC x   Sq Epi: x / Non Sq Epi: x / Bacteria: x      04-29    139  |  103  |  10  ----------------------------<  196<H>  3.8   |  28  |  1.40<H>    Ca    9.3      29 Apr 2024 06:08  Phos  2.6     04-29  Mg     1.6     04-29    TPro  6.2  /  Alb  2.9<L>  /  TBili  0.8  /  DBili  x   /  AST  18  /  ALT  21  /  AlkPhos  96  04-29    Hemoglobin A1C:     Vitamin B12     RADIOLOGY    ASSESSMENT AND PLAN:      Seen for right sided weakness/ams  consistent subacute left mca infarct  also multiple punctate subacute cerebellar infarcts  most likely cardio-embolic    full dose of AC on hold as there is risk of hemorrhagic transformation  on asa  dvt prophylaxis  permissive -180  management dw critical care team  Physical therapy evaluation.  Speech/swallowing eval in progress  Pain is accessed and addressed.  Plan of care was discussed with family(wife_. Questions answered.  Would continue to follow.

## 2024-04-29 NOTE — PROGRESS NOTE ADULT - TIME BILLING
Emotional support and counseling was provided to the patient's wife at the bedside.  Patient remains in the medical ICU for now.  Continue phenylephrine infusion to maintain MAP.  The patient is at risk for abrupt decompensation

## 2024-04-29 NOTE — PROGRESS NOTE ADULT - ASSESSMENT
83-year-old  black male with history of COPD coronary artery disease hypertension diabetes hyperlipidemia atrial fibrillation on Eliquis as well as osteomyelitis who presents now to the emergency department at [facility] complaining of rapid heart rate    jacy  copd  AF  OP  OA  CAD  HTN  DM  HLD  OM hx    cipro  IVF  monitor HD and BP  MRI reviewed  ICU care in progress  assist with needs    follows with Dr Ryland ashley med  uses NIV - BIPAP night time for JACY - sleep hygiene reviewed  cardio eval - [facility]vs rx regimen  BP control  DM care  AF rate and rhythm control  TFT  outpatient record reviewed  proventil PRN - Singulair - copd  spoke with WIFE  on emp ABX   wound care

## 2024-04-29 NOTE — CONSULT NOTE ADULT - SUBJECTIVE AND OBJECTIVE BOX
Patient is a 83y old  Male who presents with a chief complaint of AFib w/ RVR (29 Apr 2024 11:14)    Type: DX year known complications Endocrine Last seen Rx home Hx DKA/HHS, Glucometer checks, needs, weight, diet, exercise  diabetes education provided  Hx ASCVD, CKD, HF    HPI:  83yoM PMHx AFib on Eliquis, CAD, HTN, DM, HLD, COPD, osteomyelitis presents c/o shortness of breath, chest discomfort. Pt reports onset of rapid heart rate this morning, HR measured 160s when he checked his pulse ox. Also endorses dyspnea exacerbated by movement. Pt reports last episode of AFib in 2020, no episodes since then until today's presentation. Pt states that chest discomfort feels like chest pressure, no chest pain. Pt also reports chronic R big toe wound for the past few months, follows w/ Wound Care. States that he has increased sensitivity to R sole of foot, but denies pain, numbness/tingling. Denies fevers, chills, abdominal pain, N/V/D/C.     IN THE ED:  Temp 98  F ,  , /81  ,RR 18 , SpO2 94%  S/P PO , IV diltiazem 10mg x2, IV diltiazem gtt @ 10 mg/hr  Labs significant for BUN/Cr 25/1.6, troponin 507.9, pBNP 355  Imaging:   CXR wet read: no gross abnormalities (25 Apr 2024 12:13)      PAST MEDICAL & SURGICAL HISTORY:  Diabetes Mellitus, Type II      CAD (Coronary Artery Disease)  s/p 3v CABG 2004; stents placed in [place] in 2019      Dyslipidemia      Osteomyelitis      COPD (chronic obstructive pulmonary disease)  on 2L at home and BiPAP at night; intubated 6/18      Hypertension      PVD (peripheral vascular disease)      History of PAT (paroxysmal atrial tachycardia)      Asthma with COPD      BPH (benign prostatic hyperplasia)      Acute osteomyelitis      CABG (Coronary Artery Bypass Graft)  2004      Compound fracture  left leg      S/P primary angioplasty with coronary stent      H/O drainage of abscess  Left femur 12/2021          Allergies    No Known Allergies    Intolerances        MEDICATIONS  (STANDING):  albuterol/ipratropium for Nebulization 3 milliLiter(s) Nebulizer every 6 hours  aMIOdarone Infusion 0.5 mG/Min (16.7 mL/Hr) IV Continuous <Continuous>  aspirin Suppository 300 milliGRAM(s) Rectal daily  buDESOnide    Inhalation Suspension 0.5 milliGRAM(s) Inhalation two times a day  ciprofloxacin   IVPB 400 milliGRAM(s) IV Intermittent every 12 hours  dextrose 10% Bolus 125 milliLiter(s) IV Bolus once  dextrose 5%. 1000 milliLiter(s) (100 mL/Hr) IV Continuous <Continuous>  dextrose 5%. 1000 milliLiter(s) (50 mL/Hr) IV Continuous <Continuous>  dextrose 50% Injectable 25 Gram(s) IV Push once  dextrose 50% Injectable 12.5 Gram(s) IV Push once  heparin   Injectable 5000 Unit(s) SubCutaneous every 8 hours  insulin lispro (ADMELOG) corrective regimen sliding scale   SubCutaneous every 6 hours  lactated ringers. 1000 milliLiter(s) (100 mL/Hr) IV Continuous <Continuous>  phenylephrine    Infusion 0.2 MICROgram(s)/kG/Min (7.58 mL/Hr) IV Continuous <Continuous>       Patient is a 83y old  Male who presents with a chief complaint of AFib w/ RVR (29 Apr 2024 11:14)    Type:2 DX x 10 years. known complications: Hx DFU.  Endocrine: Jo Ann Next appt; May 2024. Rx home: lantus 10 units HS; Novolog 1-2 units/meals if BG >200 mg/dL will give 4-5 units; Ozempic .25 mg weekly.  No Hx DKA/HHS, Glucometer checks- has at home all supplies, denies any needs, diabetes education provided verbally and handouts.       HPI:  83yoM PMHx AFib on Eliquis, CAD, HTN, DM, HLD, COPD, osteomyelitis presents c/o shortness of breath, chest discomfort. Pt reports onset of rapid heart rate this morning, HR measured 160s when he checked his pulse ox. Also endorses dyspnea exacerbated by movement. Pt reports last episode of AFib in 2020, no episodes since then until today's presentation. Pt states that chest discomfort feels like chest pressure, no chest pain. Pt also reports chronic R big toe wound for the past few months, follows w/ Wound Care. States that he has increased sensitivity to R sole of foot, but denies pain, numbness/tingling. Denies fevers, chills, abdominal pain, N/V/D/C.     IN THE ED:  Temp 98  F ,  , /81  ,RR 18 , SpO2 94%  S/P PO , IV diltiazem 10mg x2, IV diltiazem gtt @ 10 mg/hr  Labs significant for BUN/Cr 25/1.6, troponin 507.9, pBNP 355  Imaging:   CXR wet read: no gross abnormalities (25 Apr 2024 12:13)      PAST MEDICAL & SURGICAL HISTORY:  Diabetes Mellitus, Type II      CAD (Coronary Artery Disease)  s/p 3v CABG 2004; stents placed in [place] in 2019      Dyslipidemia      Osteomyelitis      COPD (chronic obstructive pulmonary disease)  on 2L at home and BiPAP at night; intubated 6/18      Hypertension      PVD (peripheral vascular disease)      History of PAT (paroxysmal atrial tachycardia)      Asthma with COPD      BPH (benign prostatic hyperplasia)      Acute osteomyelitis      CABG (Coronary Artery Bypass Graft)  2004      Compound fracture  left leg      S/P primary angioplasty with coronary stent      H/O drainage of abscess  Left femur 12/2021          Allergies    No Known Allergies    Intolerances        MEDICATIONS  (STANDING):  albuterol/ipratropium for Nebulization 3 milliLiter(s) Nebulizer every 6 hours  aMIOdarone Infusion 0.5 mG/Min (16.7 mL/Hr) IV Continuous <Continuous>  aspirin Suppository 300 milliGRAM(s) Rectal daily  buDESOnide    Inhalation Suspension 0.5 milliGRAM(s) Inhalation two times a day  ciprofloxacin   IVPB 400 milliGRAM(s) IV Intermittent every 12 hours  dextrose 10% Bolus 125 milliLiter(s) IV Bolus once  dextrose 5%. 1000 milliLiter(s) (100 mL/Hr) IV Continuous <Continuous>  dextrose 5%. 1000 milliLiter(s) (50 mL/Hr) IV Continuous <Continuous>  dextrose 50% Injectable 25 Gram(s) IV Push once  dextrose 50% Injectable 12.5 Gram(s) IV Push once  heparin   Injectable 5000 Unit(s) SubCutaneous every 8 hours  insulin lispro (ADMELOG) corrective regimen sliding scale   SubCutaneous every 6 hours  lactated ringers. 1000 milliLiter(s) (100 mL/Hr) IV Continuous <Continuous>  phenylephrine    Infusion 0.2 MICROgram(s)/kG/Min (7.58 mL/Hr) IV Continuous <Continuous>       Patient is a 83y old  Male who presents with a chief complaint of AFib w/ RVR (29 Apr 2024 11:14)    SW wife at bedside. Type:2 DX x 10 years. known complications: Hx DFU.  Endocrine: Jo Ann Next appt; May 2024. Rx home: lantus 10 units HS; Novolog 1-2 units/meals if BG >200 mg/dL will give 4-5 units; Ozempic .25 mg weekly.  No Hx DKA/HHS, Glucometer checks- has at home all supplies, denies any needs, diabetes education provided verbally and handouts.       HPI:  83yoM PMHx AFib on Eliquis, CAD, HTN, DM, HLD, COPD, osteomyelitis presents c/o shortness of breath, chest discomfort. Pt reports onset of rapid heart rate this morning, HR measured 160s when he checked his pulse ox. Also endorses dyspnea exacerbated by movement. Pt reports last episode of AFib in 2020, no episodes since then until today's presentation. Pt states that chest discomfort feels like chest pressure, no chest pain. Pt also reports chronic R big toe wound for the past few months, follows w/ Wound Care. States that he has increased sensitivity to R sole of foot, but denies pain, numbness/tingling. Denies fevers, chills, abdominal pain, N/V/D/C.     IN THE ED:  Temp 98  F ,  , /81  ,RR 18 , SpO2 94%  S/P PO , IV diltiazem 10mg x2, IV diltiazem gtt @ 10 mg/hr  Labs significant for BUN/Cr 25/1.6, troponin 507.9, pBNP 355  Imaging:   CXR wet read: no gross abnormalities (25 Apr 2024 12:13)      PAST MEDICAL & SURGICAL HISTORY:  Diabetes Mellitus, Type II      CAD (Coronary Artery Disease)  s/p 3v CABG 2004; stents placed in [place] in 2019      Dyslipidemia      Osteomyelitis      COPD (chronic obstructive pulmonary disease)  on 2L at home and BiPAP at night; intubated 6/18      Hypertension      PVD (peripheral vascular disease)      History of PAT (paroxysmal atrial tachycardia)      Asthma with COPD      BPH (benign prostatic hyperplasia)      Acute osteomyelitis      CABG (Coronary Artery Bypass Graft)  2004      Compound fracture  left leg      S/P primary angioplasty with coronary stent      H/O drainage of abscess  Left femur 12/2021          Allergies    No Known Allergies    Intolerances        MEDICATIONS  (STANDING):  albuterol/ipratropium for Nebulization 3 milliLiter(s) Nebulizer every 6 hours  aMIOdarone Infusion 0.5 mG/Min (16.7 mL/Hr) IV Continuous <Continuous>  aspirin Suppository 300 milliGRAM(s) Rectal daily  buDESOnide    Inhalation Suspension 0.5 milliGRAM(s) Inhalation two times a day  ciprofloxacin   IVPB 400 milliGRAM(s) IV Intermittent every 12 hours  dextrose 10% Bolus 125 milliLiter(s) IV Bolus once  dextrose 5%. 1000 milliLiter(s) (100 mL/Hr) IV Continuous <Continuous>  dextrose 5%. 1000 milliLiter(s) (50 mL/Hr) IV Continuous <Continuous>  dextrose 50% Injectable 25 Gram(s) IV Push once  dextrose 50% Injectable 12.5 Gram(s) IV Push once  heparin   Injectable 5000 Unit(s) SubCutaneous every 8 hours  insulin lispro (ADMELOG) corrective regimen sliding scale   SubCutaneous every 6 hours  lactated ringers. 1000 milliLiter(s) (100 mL/Hr) IV Continuous <Continuous>  phenylephrine    Infusion 0.2 MICROgram(s)/kG/Min (7.58 mL/Hr) IV Continuous <Continuous>

## 2024-04-29 NOTE — CONSULT NOTE ADULT - PROBLEM SELECTOR RECOMMENDATION 9
Type 2 A1c 6.8% adm AF  Recommend endocrine-[facility] on consult  cont Low corrective scale q6  FU appt: TBA  DSC recommendations: return to home regimen lantus 10 units HS; lispro SS; ozempic 0.25 mg weekly and glucose monitoring  diabetes education provided  Diabetes support info and cell # [phone] given   Goal 100-180 mg/dL; 140-180 mg/dL in critical care areas Type 2 A1c 6.8% adm AF  Recommend endocrine-[facility]lman on consult  cont Low corrective scale q6  FU appt: Jo Ann May 2024  DSC recommendations: return to home regimen lantus 10 units HS; lispro SS; ozempic 0.25 mg weekly and glucose monitoring  diabetes education provided  Diabetes support info and cell # [phone] given   Goal 100-180 mg/dL; 140-180 mg/dL in critical care areas

## 2024-04-29 NOTE — PROGRESS NOTE ADULT - SUBJECTIVE AND OBJECTIVE BOX
Date/Time Patient Seen:  		  Referring MD:   Data Reviewed	       Patient is a 83y old  Male who presents with a chief complaint of AFib w/ RVR (28 Apr 2024 10:41)      Subjective/HPI     PAST MEDICAL & SURGICAL HISTORY:  Diabetes Mellitus, Type II    CAD (Coronary Artery Disease)  s/p 3v CABG 2004; stents placed in [place] in 2019    Dyslipidemia    Asymptomatic Peripheral Vascular Disease    Osteomyelitis    COPD (chronic obstructive pulmonary disease)  on 2L at home and BiPAP at night; intubated 6/18    Diabetes mellitus    Hypertension    PVD (peripheral vascular disease)    History of PAT (paroxysmal atrial tachycardia)    Asthma with COPD    BPH (benign prostatic hyperplasia)    Acute osteomyelitis    CABG (Coronary Artery Bypass Graft)  2004    Compound fracture  left leg    S/P primary angioplasty with coronary stent    H/O drainage of abscess  Left femur 12/2021          Medication list         MEDICATIONS  (STANDING):  aMIOdarone Infusion 0.5 mG/Min (16.7 mL/Hr) IV Continuous <Continuous>  aspirin Suppository 300 milliGRAM(s) Rectal daily  ciprofloxacin   IVPB 400 milliGRAM(s) IV Intermittent every 12 hours  dextrose 10% Bolus 125 milliLiter(s) IV Bolus once  dextrose 5%. 1000 milliLiter(s) (100 mL/Hr) IV Continuous <Continuous>  dextrose 5%. 1000 milliLiter(s) (50 mL/Hr) IV Continuous <Continuous>  dextrose 50% Injectable 25 Gram(s) IV Push once  dextrose 50% Injectable 12.5 Gram(s) IV Push once  heparin   Injectable 5000 Unit(s) SubCutaneous every 8 hours  insulin lispro (ADMELOG) corrective regimen sliding scale   SubCutaneous every 6 hours  lactated ringers. 1000 milliLiter(s) (100 mL/Hr) IV Continuous <Continuous>  phenylephrine    Infusion 0.2 MICROgram(s)/kG/Min (7.58 mL/Hr) IV Continuous <Continuous>    MEDICATIONS  (PRN):  albuterol/ipratropium for Nebulization 3 milliLiter(s) Nebulizer every 6 hours PRN Shortness of Breath and/or Wheezing         Vitals log        ICU Vital Signs Last 24 Hrs  T(C): 37.1 (29 Apr 2024 04:13), Max: 37.8 (29 Apr 2024 00:07)  T(F): 98.8 (29 Apr 2024 04:13), Max: 100 (29 Apr 2024 00:07)  HR: 80 (29 Apr 2024 05:45) (65 - 99)  BP: 171/76 (29 Apr 2024 05:45) (148/60 - 206/107)  BP(mean): 115 (29 Apr 2024 05:45) (96 - 141)  ABP: --  ABP(mean): --  RR: 19 (29 Apr 2024 05:45) (15 - 28)  SpO2: 97% (29 Apr 2024 05:45) (95% - 100%)    O2 Parameters below as of 28 Apr 2024 23:00  Patient On (Oxygen Delivery Method): BiPAP/CPAP                 Input and Output:  I&O's Detail    27 Apr 2024 07:01  -  28 Apr 2024 07:00  --------------------------------------------------------  IN:    Amiodarone: 133.6 mL    IV PiggyBack: 200 mL    Lactated Ringers: 800 mL    Phenylephrine: 60.7 mL  Total IN: 1194.3 mL    OUT:    Incontinent per Condom Catheter (mL): 450 mL    Voided (mL): 1700 mL  Total OUT: 2150 mL    Total NET: -955.7 mL      28 Apr 2024 07:01  -  29 Apr 2024 05:50  --------------------------------------------------------  IN:    Amiodarone: 366.3 mL    IV PiggyBack: 100 mL    IV PiggyBack: 312.5 mL    IV PiggyBack: 400 mL    Lactated Ringers: 2200 mL    Phenylephrine: 794.5 mL  Total IN: 4173.3 mL    OUT:    Incontinent per Condom Catheter (mL): 1200 mL    Voided (mL): 900 mL  Total OUT: 2100 mL    Total NET: 2073.3 mL          Lab Data                        13.8   9.20  )-----------( 242      ( 28 Apr 2024 09:00 )             43.6     04-28    143  |  108  |  12  ----------------------------<  170<H>  4.1   |  30  |  1.50<H>    Ca    9.6      28 Apr 2024 09:00  Phos  2.4     04-28  Mg     1.7     04-28    TPro  6.5  /  Alb  3.0<L>  /  TBili  0.6  /  DBili  x   /  AST  19  /  ALT  24  /  AlkPhos  102  04-28    ABG - ( 27 Apr 2024 11:10 )  pH, Arterial: 7.40  pH, Blood: x     /  pCO2: 49    /  pO2: 69    / HCO3: 30    / Base Excess: 5.6   /  SaO2: 94.5              CARDIAC MARKERS ( 27 Apr 2024 10:38 )  x     / x     / 63 U/L / x     / 3.9 ng/mL        Review of Systems	      Objective     Physical Examination    heart s1s2  lung dc BS  head nc  head at      Pertinent Lab findings & Imaging      Eliecer:  NO   Adequate UO     I&O's Detail    27 Apr 2024 07:01  -  28 Apr 2024 07:00  --------------------------------------------------------  IN:    Amiodarone: 133.6 mL    IV PiggyBack: 200 mL    Lactated Ringers: 800 mL    Phenylephrine: 60.7 mL  Total IN: 1194.3 mL    OUT:    Incontinent per Condom Catheter (mL): 450 mL    Voided (mL): 1700 mL  Total OUT: 2150 mL    Total NET: -955.7 mL      28 Apr 2024 07:01  -  29 Apr 2024 05:50  --------------------------------------------------------  IN:    Amiodarone: 366.3 mL    IV PiggyBack: 100 mL    IV PiggyBack: 312.5 mL    IV PiggyBack: 400 mL    Lactated Ringers: 2200 mL    Phenylephrine: 794.5 mL  Total IN: 4173.3 mL    OUT:    Incontinent per Condom Catheter (mL): 1200 mL    Voided (mL): 900 mL  Total OUT: 2100 mL    Total NET: 2073.3 mL               Discussed with:     Cultures:	        Radiology

## 2024-04-29 NOTE — PROGRESS NOTE ADULT - PROBLEM SELECTOR PLAN 1
Patient was found to have left M3 occlusion. Transferred to ICU for worsening stroke symptoms and pressor support   - CTA brain: Left M3 branch occlusion. No vascular aneurysm. No AVM.  - CTA neck: No flow-limiting stenosis, evidence for arterial dissection, or vascular aneurysm.  - CT Head Repeat: Moderate-sized evolving acute/subacute infarct in the left middle cerebral artery vascular territory, centered in the ventral left parietal convexity, with extension into the posterior aspect of the left insula. No hemorrhagic conversion.   - MRI Head: Multiple acute infarcts scattered throughout the bilateral cerebral and cerebellar hemispheres. More dominant acute to subacute infarction in the posterior left MCA vascular distribution. Chronic small vessel disease.  - Continue aspirin suppository  - Continue phenylephrine gtt to maintain -170 for permissive HTN   - Keep O2 sat > 92%  - Neuro checks q4h  - NPO pending speech and swallow   - Fall and aspiration precautions, PT/OT/Speech and Swallow eval   - Neurology Dr. Underwood consulted, f/u recs  - Plan per ICU

## 2024-04-29 NOTE — PROGRESS NOTE ADULT - SUBJECTIVE AND OBJECTIVE BOX
[facility] - Génesis Villavicencio, Carroll Haney Savella, [place]  Office Number:  [phone]    Patient resting comfortably in bed in NAD.   in sr  on amio gtt  sr    ROS: negative unless otherwise mentioned.    Telemetry:  sr    MEDICATIONS  (STANDING):  albuterol/ipratropium for Nebulization 3 milliLiter(s) Nebulizer every 6 hours  aMIOdarone Infusion 0.5 mG/Min (16.7 mL/Hr) IV Continuous <Continuous>  aspirin Suppository 300 milliGRAM(s) Rectal daily  buDESOnide    Inhalation Suspension 0.5 milliGRAM(s) Inhalation two times a day  ciprofloxacin   IVPB 400 milliGRAM(s) IV Intermittent every 12 hours  dextrose 10% Bolus 125 milliLiter(s) IV Bolus once  dextrose 5%. 1000 milliLiter(s) (100 mL/Hr) IV Continuous <Continuous>  dextrose 5%. 1000 milliLiter(s) (50 mL/Hr) IV Continuous <Continuous>  dextrose 50% Injectable 25 Gram(s) IV Push once  dextrose 50% Injectable 12.5 Gram(s) IV Push once  heparin   Injectable 5000 Unit(s) SubCutaneous every 8 hours  insulin lispro (ADMELOG) corrective regimen sliding scale   SubCutaneous every 6 hours  lactated ringers. 1000 milliLiter(s) (100 mL/Hr) IV Continuous <Continuous>  phenylephrine    Infusion 0.2 MICROgram(s)/kG/Min (7.58 mL/Hr) IV Continuous <Continuous>    MEDICATIONS  (PRN):      Allergies    No Known Allergies    Intolerances        Vital Signs Last 24 Hrs  T(C): 37.4 (29 Apr 2024 07:49), Max: 37.8 (29 Apr 2024 00:07)  T(F): 99.3 (29 Apr 2024 07:49), Max: 100 (29 Apr 2024 00:07)  HR: 65 (29 Apr 2024 11:00) (65 - 99)  BP: 176/83 (29 Apr 2024 11:00) (148/70 - 206/107)  BP(mean): 119 (29 Apr 2024 11:00) (98 - 141)  RR: 19 (29 Apr 2024 11:00) (16 - 28)  SpO2: 96% (29 Apr 2024 11:00) (95% - 100%)    Parameters below as of 28 Apr 2024 23:00  Patient On (Oxygen Delivery Method): BiPAP/CPAP        I&O's Summary    28 Apr 2024 07:01  -  29 Apr 2024 07:00  --------------------------------------------------------  IN: 4474.9 mL / OUT: 2100 mL / NET: 2374.9 mL    29 Apr 2024 07:01  -  29 Apr 2024 11:14  --------------------------------------------------------  IN: 588 mL / OUT: 0 mL / NET: 588 mL        ON EXAM:    General: NAD, awake and alert, oriented x 3  HEENT: Mucous membranes are moist, anicteric  Lungs: Non-labored, breath sounds are clear bilaterally, No wheezing, rales or rhonchi  Cardiovascular: Regular, S1 and S2, no murmurs, rubs, or gallops  Gastrointestinal: Bowel Sounds present, soft, nontender.   Lymph: No peripheral edema. No lymphadenopathy.  Skin: No rashes or ulcers  Psych:  Mood & affect appropriate for clinical condition    LABS: All Labs Reviewed:                        13.0   8.57  )-----------( 207      ( 29 Apr 2024 06:08 )             41.5                         13.8   9.20  )-----------( 242      ( 28 Apr 2024 09:00 )             43.6                         13.6   5.90  )-----------( 178      ( 27 Apr 2024 10:38 )             43.1     29 Apr 2024 06:08    139    |  103    |  10     ----------------------------<  196    3.8     |  28     |  1.40   28 Apr 2024 09:00    143    |  108    |  12     ----------------------------<  170    4.1     |  30     |  1.50   27 Apr 2024 14:47    142    |  105    |  15     ----------------------------<  174    4.1     |  30     |  1.40     Ca    9.3        29 Apr 2024 06:08  Ca    9.6        28 Apr 2024 09:00  Ca    8.8        27 Apr 2024 14:47  Phos  2.6       29 Apr 2024 06:08  Phos  2.4       28 Apr 2024 09:00  Phos  2.4       27 Apr 2024 07:25  Mg     1.6       29 Apr 2024 06:08  Mg     1.7       28 Apr 2024 09:00  Mg     2.0       27 Apr 2024 07:25    TPro  6.2    /  Alb  2.9    /  TBili  0.8    /  DBili  x      /  AST  18     /  ALT  21     /  AlkPhos  96     29 Apr 2024 06:08  TPro  6.5    /  Alb  3.0    /  TBili  0.6    /  DBili  x      /  AST  19     /  ALT  24     /  AlkPhos  102    28 Apr 2024 09:00  TPro  6.1    /  Alb  2.9    /  TBili  0.5    /  DBili  x      /  AST  23     /  ALT  31     /  AlkPhos  104    27 Apr 2024 10:38    PTT - ( 29 Apr 2024 06:08 )  PTT:34.0 sec      Blood Culture:

## 2024-04-29 NOTE — PROGRESS NOTE ADULT - PROBLEM SELECTOR PLAN 3
Elevated trop on admission likely 2/2 Afib w/ RVR   - TTE 4/26/2024: technically difficult study, LV no well visualized however LV probably normal based on limited views, moderate thickening of aortic valve leaflets, trace TR, dilated IVC with normal inspiratory collapse, normal pulmonary artery pressure  - Trop x2 both sets elevated but downtrended   - No complaints of chest pain, low suspicions of ACS --> monitor for signs and symptoms of ischemia   - Cardio following  - Plan per ICU

## 2024-04-29 NOTE — SPEECH LANGUAGE PATHOLOGY EVALUATION - SLP DIAGNOSIS
Of note, max multimodality cueing was implemented after initial assessment of pt ability to complete the presented tasks independently to assess pt stimulability. In the setting of acute stroke, the pt presented with impaired receptive language marked by pt inability to identify objects, reduced ability to answer concrete yes/no questions (25%), and reduced ability to follow simple one-step commands (25%). Expressive language was judged to be impaired marked by tangential speech, pt difficulty stating automatic sequences (i.e. counting), inability to complete confrontational naming task, and reduced repetition at the word level (25%). Cognitive-linguistic deficits were noted marked by reduced attention to task and reduced ability to maintain eye contact. Motor speech deificts were observed marked by reduced articulatory precision. Voice was informally assessed; volume and vocal quality were judged to be functional.

## 2024-04-29 NOTE — PROGRESS NOTE ADULT - SUBJECTIVE AND OBJECTIVE BOX
Name: YUE COTTO  MRN: [id number]  LOCATION: [address]    ----  Patient is a 83y old  Male who presents with a chief complaint of AFib w/ RVR (2024 12:46)      FROM ADMISSION H+P:   HPI:  83yoM PMHx AFib on Eliquis, CAD, HTN, DM, HLD, COPD, osteomyelitis presents c/o shortness of breath, chest discomfort. Pt reports onset of rapid heart rate this morning, HR measured 160s when he checked his pulse ox    ----  INTERVAL HPI/OVERNIGHT EVENTS: Pt seen and evaluated at the bedside. No acute overnight events occurred.  The patient continues to have word finding difficulties but appears to comprehend conversation logically.  It is difficult to understand him at this time.  The patient's wife is at the bedside.  The patient remains on phenylephrine to maintain elevated blood pressures    ----  PAST MEDICAL & SURGICAL HISTORY:  Diabetes Mellitus, Type II      CAD (Coronary Artery Disease)  s/p 3v CABG ; stents placed in [place] in       Dyslipidemia      Osteomyelitis      COPD (chronic obstructive pulmonary disease)  on 2L at home and BiPAP at night; intubated       Hypertension      PVD (peripheral vascular disease)      History of PAT (paroxysmal atrial tachycardia)      Asthma with COPD      BPH (benign prostatic hyperplasia)      Acute osteomyelitis      CABG (Coronary Artery Bypass Graft)        Compound fracture  left leg      S/P primary angioplasty with coronary stent      H/O drainage of abscess  Left femur 2021          FAMILY HISTORY:  Family history of diabetes mellitus (Sibling)    Family hx of lung cancer  brother,  age 82, used to smoke with pt        Allergies    No Known Allergies    Intolerances        ----  REVIEW OF SYSTEMS:  I am not able to obtain comprehensive reliable review of systems due to the patient's word finding difficulties    ----  PHYSICAL EXAM:  GENERAL: patient appears advanced age, nontoxic-appearing  ENMT: oropharynx clear without erythema, moist mucous membranes, right sided facial droop  LUNGS: Reduced air entry, no wheezing, coarse breath sounds throughout  HEART: S1/S2, regular rate and irregular rhythm, distant heart sounds  GASTROINTESTINAL: abdomen is soft, nontender, nondistended, normoactive bowel sounds, no palpable masses  MUSCULOSKELETAL: no clubbing or cyanosis, no obvious deformity  NEUROLOGIC: awake, alert, readily interactive, right-sided weakness, word finding difficulties    T(C): 37.6 (04-29-24 @ 15:29), Max: 37.8 (24 @ 00:07)  HR: 70 (24 @ 16:15) (65 - 99)  BP: 164/73 (24 @ 16:15) (146/70 - 206/107)  RR: 17 (24 @ 16:15) (15 - 29)  SpO2: 95% (24 @ 16:15) (95% - 100%)  Wt(kg): --    ----  INTAKE & OUTPUT:  I&O's Summary    2024 07:01  -  2024 07:00  --------------------------------------------------------  IN: 4474.9 mL / OUT: 2100 mL / NET: 2374.9 mL    2024 07:01  -  2024 16:24  --------------------------------------------------------  IN: 826.8 mL / OUT: 0 mL / NET: 826.8 mL        LABS:                        13.0   8.57  )-----------( 207      ( 2024 06:08 )             41.5         139  |  103  |  10  ----------------------------<  196<H>  3.8   |  28  |  1.40<H>    Ca    9.3      2024 06:08  Phos  2.6       Mg     1.6         TPro  6.2  /  Alb  2.9<L>  /  TBili  0.8  /  DBili  x   /  AST  18  /  ALT  21  /  AlkPhos  96  -29    PTT - ( 2024 06:08 )  PTT:34.0 sec  Urinalysis Basic - ( 2024 06:08 )    Color: x / Appearance: x / SG: x / pH: x  Gluc: 196 mg/dL / Ketone: x  / Bili: x / Urobili: x   Blood: x / Protein: x / Nitrite: x   Leuk Esterase: x / RBC: x / WBC x   Sq Epi: x / Non Sq Epi: x / Bacteria: x      CAPILLARY BLOOD GLUCOSE      POCT Blood Glucose.: 175 mg/dL (2024 11:14)  POCT Blood Glucose.: 150 mg/dL (2024 05:03)  POCT Blood Glucose.: 148 mg/dL (2024 23:22)  POCT Blood Glucose.: 156 mg/dL (2024 17:30)          ----  DATA REVIEW:  Personally reviewed:  -Vital sign trends: Blood pressure elevated, afebrile, remains on low-flow nasal cannula  -Laboratory results: No leukocytosis, hemoglobin and hematocrit are stable, stable renal indices, stable electrolytes  -Radiology results: MRI reviewed, multiple infarcts in bilateral hemispheres  -Microbio results: No new culture data  -Consultant recommendations:  Neurology and ICU recommendations reviewed              ----  ACTIVE MEDICATIONS (by system):  - CV - aMIOdarone Infusion 0.5 mG/Min IV Continuous <Continuous>  - CV - aspirin Suppository 300 milliGRAM(s) Rectal daily  - CV - phenylephrine    Infusion 0.2 MICROgram(s)/kG/Min IV Continuous <Continuous>  - ENDO - insulin lispro (ADMELOG) corrective regimen sliding scale   SubCutaneous every 6 hours  - ID - ciprofloxacin   IVPB 400 milliGRAM(s) IV Intermittent every 12 hours  - PULM - albuterol/ipratropium for Nebulization 3 milliLiter(s) Nebulizer every 6 hours PRN  - PULM - buDESOnide    Inhalation Suspension 0.5 milliGRAM(s) Inhalation two times a day  - VTE PPX - heparin   Injectable 5000 Unit(s) SubCutaneous every 8 hours

## 2024-04-29 NOTE — PROGRESS NOTE ADULT - PROBLEM SELECTOR PLAN 2
History of Afib, on home Metoprolol BID and Eliquis   - TTE 4/26/2024: LV no well visualized however LV probably normal based on limited views, moderate thickening of aortic valve leaflets, trace TR, dilated IVC with normal inspiratory collapse, normal pulmonary artery pressure  - Hold Eliquis as patient NPO - risk for hemorrhagic conversion  - Hold home BB to allow for permissive HTN   - Continue Amiodarone gtt   - Monitor on tele   - Monitor and replete lytes, keep K>4, Mg>2  - Cardio following  - Plan per ICU

## 2024-04-29 NOTE — SOCIAL WORK PROGRESS NOTE - NSSWPROGRESSNOTE_GEN_ALL_CORE
Per PTE pt can benefit from dc to Acute Rehab when medically cleared. NIEVES reached out to pt's wife/Sania at ([phone]) in order to discuss recommendations. NIEVES left a voice message. Will continue to follow up.

## 2024-04-29 NOTE — PROGRESS NOTE ADULT - PROBLEM SELECTOR PLAN 6
Chronic  - continue asa suppository   - hold home statin as NPO   - Lipid panel within normal  - Plan per ICU

## 2024-04-29 NOTE — PROGRESS NOTE ADULT - PROBLEM SELECTOR PLAN 7
Chronic  - Continue Phenyephrine gtt to maintain -170 for permissive HTN   - Discontinue lopressor for permissive HTN x24h  - Monitor routine hemodynamics  - Plan per ICU

## 2024-04-29 NOTE — SPEECH LANGUAGE PATHOLOGY EVALUATION - COMMENTS
Prior to, during, and following the evaluation, the pt was seated upright at which time he was awake, alert, and in NAD. Pt was left with his wife at bedside following the evaluation. "83yoM PMHx AFib on Eliquis, CAD, HTN, DM, HLD, COPD, osteomyelitis presents c/o shortness of breath, chest discomfort. Pt reports onset of rapid heart rate this morning, HR measured 160s when he checked his pulse ox. Also endorses dyspnea exacerbated by movement. Pt reports last episode of AFib in 2020, no episodes since then until today's presentation. Pt states that chest discomfort feels like chest pressure, no chest pain. Pt also reports chronic R big toe wound for the past few months, follows w/ Wound Care. States that he has increased sensitivity to R sole of foot, but denies pain, numbness/tingling. Denies fevers, chills, abdominal pain, N/V/D/C. "     MR Head 4/27/24: "IMPRESSION:  1.  Multiple acute infarcts scattered throughout the bilateral cerebral   and cerebellar hemispheres. Given multiple vascular distributions   involved, an embolic source should be considered. 2.  More dominant acute to subacute infarction in the posterior left MCA   vascular distribution. 3.  Chronic small vessel disease."     X-ray Chest 4/27/24:"IMPRESSION:  Moderate centrilobular emphysema, better characterized on CT, grossly   unchanged"

## 2024-04-30 NOTE — PROGRESS NOTE ADULT - PROBLEM SELECTOR PLAN 10
#VTE ppx: Heparin subcu  #GI ppx: As per primary team  #Diet: Diet, NPO:     Special Instructions for Nursing:  CODE STROKE; NPO until Dysphagia screen or Speech Bedside Swallow Evaluation completed and passed (04-27-24 @ 09:56) [Active]  #Activity: Activity - Increase As Tolerated:   Time/Priority:  Routine (04-27-24 @ 22:51) [Active]  #ACP: Full code  #Dispo: further plans pending clinical course

## 2024-04-30 NOTE — PROGRESS NOTE ADULT - PROBLEM SELECTOR PLAN 2
History of Afib, on home Metoprolol BID and Eliquis   - TTE 4/26/2024: LV no well visualized however LV probably normal based on limited views, moderate thickening of aortic valve leaflets, trace TR, dilated IVC with normal inspiratory collapse, normal pulmonary artery pressure  - Holding all p.o. meds at this time  - Consider resumption of therapeutic anticoagulation pending neurology's recommendations  - Hold home BB to allow for permissive HTN   - Continue Amiodarone gtt, plan to transition to p.o. once advancing diet  - Telemetry monitoring, electrolyte monitoring  - Plan per ICU

## 2024-04-30 NOTE — PROGRESS NOTE ADULT - SUBJECTIVE AND OBJECTIVE BOX
Neurology Follow up note    YUE [id number]    HPI:  83yoM PMHx AFib on Eliquis, CAD, HTN, DM, HLD, COPD, osteomyelitis presents c/o shortness of breath, chest discomfort. Pt reports onset of rapid heart rate this morning, HR measured 160s when he checked his pulse ox. Also endorses dyspnea exacerbated by movement. Pt reports last episode of AFib in 2020, no episodes since then until today's presentation. Pt states that chest discomfort feels like chest pressure, no chest pain. Pt also reports chronic R big toe wound for the past few months, follows w/ Wound Care. States that he has increased sensitivity to R sole of foot, but denies pain, numbness/tingling. Denies fevers, chills, abdominal pain, N/V/D/C.     IN THE ED:  Temp 98  F ,  , /81  ,RR 18 , SpO2 94%  S/P PO , IV diltiazem 10mg x2, IV diltiazem gtt @ 10 mg/hr  Labs significant for BUN/Cr 25/1.6, troponin 507.9, pBNP 355  Imaging:   CXR wet read: no gross abnormalities (25 Apr 2024 12:13)      Interval History -no new events    Patient is seen, chart was reviewed and case was discussed with the treatment team.  Pt is not in any distress.   Lying on bed comfortably.     .    Vital Signs Last 24 Hrs  T(C): 37.9 (30 Apr 2024 16:40), Max: 37.9 (30 Apr 2024 16:40)  T(F): 100.2 (30 Apr 2024 16:40), Max: 100.2 (30 Apr 2024 16:40)  HR: 92 (30 Apr 2024 18:30) (64 - 92)  BP: 163/74 (30 Apr 2024 18:30) (128/68 - 217/88)  BP(mean): 107 (30 Apr 2024 18:30) (91 - 141)  RR: 22 (30 Apr 2024 18:30) (12 - 27)  SpO2: 95% (30 Apr 2024 18:30) (92% - 100%)    Parameters below as of 29 Apr 2024 21:09  Patient On (Oxygen Delivery Method): BiPAP/CPAP                REVIEW OF SYSTEMS:    Constitutional: No fever, weight loss or fatigue  Eyes: No eye pain, visual disturbances, or discharge  ENT:  No difficulty hearing, tinnitus, vertigo; No sinus or throat pain  Neck: No pain or stiffness  Respiratory: No wheezing, chills or hemoptysis  Cardiovascular: No chest pain, palpitations, shortness of breath, dizziness  Gastrointestinal: No abdominal or epigastric pain. No nausea, vomiting or hematemesis  Genitourinary: No dysuria, frequency, hematuria or incontinence  Neurological: No headaches, numbness or tremors  Psychiatric: No depression, anxiety, mood swings or difficulty sleeping  Musculoskeletal: No joint pain or swelling; No muscle, back or extremity pain  Skin: No itching, burning, rashes or lesions   Lymph Nodes: No enlarged glands  Endocrine: No heat or cold intolerance; No hair loss,   Allergy and Immunologic: No hives or eczema    On Neurological Examination:    Mental Status - Pt is alert, awake, oriented X3.. Follows commands well and able to answer questions appropriately.Mood and affect  normal    Speech -  MIXED APHASIA    Cranial Nerves - Pupils 3 mm equal and reactive to light, extraocular eye movements intact. Pt has no visual field deficit.  Pt has right facial weakness  . Facial sensation is intact.Tongue - is in midline.    Muscle tone - is decreased on right  .      Motor Exam -right hemiparesis; RUE 1/5; LE 1-2/5   No shaking or tremors.    Sensory Exam -. Pt withdraws all extremities equally on stimulation. No asymmetry seen. No complaints of tingling, numbness.        coordination:    Finger to nose: normal-left        Deep tendon Reflexes - 2 plus all over.            Neck Supple -  Yes.     MEDICATIONS  (STANDING):  albuterol/ipratropium for Nebulization 3 milliLiter(s) Nebulizer every 6 hours  aMIOdarone Infusion 0.5 mG/Min (16.7 mL/Hr) IV Continuous <Continuous>  aspirin Suppository 300 milliGRAM(s) Rectal daily  buDESOnide    Inhalation Suspension 0.5 milliGRAM(s) Inhalation two times a day  ciprofloxacin   IVPB 400 milliGRAM(s) IV Intermittent every 12 hours  dextrose 10% Bolus 125 milliLiter(s) IV Bolus once  dextrose 5%. 1000 milliLiter(s) (100 mL/Hr) IV Continuous <Continuous>  dextrose 5%. 1000 milliLiter(s) (50 mL/Hr) IV Continuous <Continuous>  dextrose 50% Injectable 12.5 Gram(s) IV Push once               dextrose 50% Injectable 25 Gram(s) IV Push once  heparin   Injectable 5000 Unit(s) SubCutaneous every 8 hours  insulin lispro (ADMELOG) corrective regimen sliding scale   SubCutaneous every 6 hours  phenylephrine    Infusion 0.2 MICROgram(s)/kG/Min (7.58 mL/Hr) IV Continuous <Continuous>    MEDICATIONS  (PRN):      Allergies    No Known Allergies    Intolerances                          14.5   8.39  )-----------( 212      ( 30 Apr 2024 05:45 )             45.4       Hemoglobin A1C:     Vitamin B12     RADIOLOGY    ASSESSMENT AND PLAN:      Seen for right sided weakness/ams  consistent subacute left mca infarct  also multiple punctate subacute cerebellar infarcts  most likely cardio-embolic    full dose of AC on hold as there is risk of hemorrhagic transformation  on asa  dvt prophylaxis  permissive -180  management dw critical care team  Physical therapy evaluation.  Speech/swallowing eval in progress  Pain is accessed and addressed.  Plan of care was discussed with family(wife_. Questions answered.  Would continue to follow.

## 2024-04-30 NOTE — CHART NOTE - NSCHARTNOTEFT_GEN_A_CORE
Assessment:   Pt seen for nutrition follow up.  Chart reviewed, hospital course noted.     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  Per CC note, pt to have repeat swallow eval.  Pt NPO x 4/27.  BM 4/26    Factors impacting intake: [ ] none [ ] nausea  [ ] vomiting [ ] diarrhea [ ] constipation  [ ]chewing problems [ ] swallowing issues  [ ] other:     Diet Prescription: Diet, NPO:      Special Instructions for Nursing:  CODE STROKE; NPO until Dysphagia screen or Speech Bedside Swallow Evaluation completed and passed (04-27-24 @ 09:56)      Current Weight: 4/27 232.1#, 4/30 224.4#      Pertinent Medications: MEDICATIONS  (STANDING):  albuterol/ipratropium for Nebulization 3 milliLiter(s) Nebulizer every 6 hours  aMIOdarone Infusion 0.5 mG/Min (16.7 mL/Hr) IV Continuous <Continuous>  aspirin Suppository 300 milliGRAM(s) Rectal daily  buDESOnide    Inhalation Suspension 0.5 milliGRAM(s) Inhalation two times a day  ciprofloxacin   IVPB 400 milliGRAM(s) IV Intermittent every 12 hours  dextrose 10% Bolus 125 milliLiter(s) IV Bolus once  dextrose 5%. 1000 milliLiter(s) (100 mL/Hr) IV Continuous <Continuous>  dextrose 5%. 1000 milliLiter(s) (50 mL/Hr) IV Continuous <Continuous>  dextrose 50% Injectable 25 Gram(s) IV Push once  dextrose 50% Injectable 12.5 Gram(s) IV Push once  heparin   Injectable 5000 Unit(s) SubCutaneous every 8 hours  insulin lispro (ADMELOG) corrective regimen sliding scale   SubCutaneous every 6 hours  phenylephrine    Infusion 0.2 MICROgram(s)/kG/Min (7.58 mL/Hr) IV Continuous <Continuous>    MEDICATIONS  (PRN):    Pertinent Labs: 04-30 Na139 mmol/L Glu 196 mg/dL<H> K+ 4.4 mmol/L Cr  1.40 mg/dL<H> BUN 12 mg/dL 04-30 Phos 3.0 mg/dL 04-30 Alb 3.1 g/dL<L> 04-26 Chol 124 mg/dL LDL --    HDL 48 mg/dL Trig 93 mg/dL     CAPILLARY BLOOD GLUCOSE      POCT Blood Glucose.: 168 mg/dL (30 Apr 2024 05:20)  POCT Blood Glucose.: 150 mg/dL (29 Apr 2024 23:20)  POCT Blood Glucose.: 185 mg/dL (29 Apr 2024 17:11)      Skin: no pressure injuries  Edema: 1+ dependent feet/ankles    Estimated Needs:   [x] no change since previous assessment on: 4/27 based on #  [ ] recalculated: based on   kcal/day: 8180-2200  gms protein/day:  81-102gms    Previous Nutrition Diagnosis:   [x] Inadequate Oral Intake     Nutrition Diagnosis is [x] ongoing  [ ] resolved [ ] not applicable     New Nutrition Diagnosis: [x] Swallowing Difficulty related to motor causes evidenced by failed swallow eval.      Interventions:   Recommend  [ ] Continue current diet  [ ] Change Diet To:  [ ] Nutrition Supplement  [ ] Nutrition Support  [x] Other:  awaiting repeat swallow eval for determination of ability to tolerate po    Monitoring and Evaluation:   [x] PO intake [ x ] Tolerance to diet prescription [ x ] weights [ x ] labs [ x ] follow up per protocol  [x] other: s/s GI distress, bowel function, skin integrity/ edema

## 2024-04-30 NOTE — PROGRESS NOTE ADULT - TIME BILLING
The patient remains on phenylephrine to maintain MAP while in the ICU.  Neurochecks per protocol.  Swallow evaluation per protocol.  The patient remains n.p.o. but will consider an NG tube placement today.  Spoke with the ICU team.  Spoke with the patient's spouse at the bedside.  Emotional support and counseling were provided to the patient and family.

## 2024-04-30 NOTE — PROGRESS NOTE ADULT - SUBJECTIVE AND OBJECTIVE BOX
Name: YUE COTTO  MRN: [id number]  LOCATION: [facility] ICU1 [facility]    ----  Patient is a 83y old  Male who presents with a chief complaint of AFib w/ RVR (2024 11:05)      FROM ADMISSION H+P:   HPI:  83yoM PMHx AFib on Eliquis, CAD, HTN, DM, HLD, COPD, osteomyelitis presents c/o shortness of breath, chest discomfort. Pt reports onset of rapid heart rate this morning, HR measured 160s when he checked his pulse ox. Also endorses dyspnea exacerbated by movement    ----  INTERVAL HPI/OVERNIGHT EVENTS: Pt seen and evaluated at the bedside. No acute overnight events occurred.  The patient's speech has been intermittently garbled.  The patient's spouse states that sometimes he is able to communicate clearly but other times his words are not logical.  He denies any pain.  He tolerated BiPAP overnight which is his baseline.  Fever or chills.  He has no other active complaints at this time.  The patient remains on phenylephrine to maintain elevated MAP.  The patient also worked with physical therapy today.  He reports sensation throughout all 4 extremities.  The patient's wife notes improving but twitching like motor movements in the distal right upper and lower extremity    ----  PAST MEDICAL & SURGICAL HISTORY:  Diabetes Mellitus, Type II      CAD (Coronary Artery Disease)  s/p 3v CABG ; stents placed in [place] in       Dyslipidemia      Osteomyelitis      COPD (chronic obstructive pulmonary disease)  on 2L at home and BiPAP at night; intubated       Hypertension      PVD (peripheral vascular disease)      History of PAT (paroxysmal atrial tachycardia)      Asthma with COPD      BPH (benign prostatic hyperplasia)      Acute osteomyelitis      CABG (Coronary Artery Bypass Graft)        Compound fracture  left leg      S/P primary angioplasty with coronary stent      H/O drainage of abscess  Left femur 2021          FAMILY HISTORY:  Family history of diabetes mellitus (Sibling)    Family hx of lung cancer  brother,  age 82, used to smoke with pt        Allergies    No Known Allergies    Intolerances        ----  REVIEW OF SYSTEMS:  I am not able to obtain comprehensive reliable review of systems due to the patient's word finding difficulties but generally the patient denies any active complaints.  The patient's spouse also has been at the bedside for the majority of the last 24 hours and he has not been communicating any discomfort to her    ----  PHYSICAL EXAM:  GENERAL: patient appears advanced age, nontoxic-appearing  ENMT: oropharynx clear without erythema, moist mucous membranes, right sided facial drooping  LUNGS: Reduced air entry, no wheezing, coarse breath sounds throughout  HEART: S1/S2, regular rate and irregular rhythm, distant heart sounds  GASTROINTESTINAL: abdomen is soft, nontender, nondistended, normoactive bowel sounds, no palpable masses  MUSCULOSKELETAL: no clubbing or cyanosis, no obvious deformity  NEUROLOGIC: awake, alert, readily interactive, right-sided weakness, word finding difficulties    T(C): 37.6 (24 @ 11:48), Max: 37.8 (24 @ 00:06)  HR: 77 (24 @ 12:30) (64 - 92)  BP: 177/81 (24 @ 12:30) (128/68 - 217/88)  RR: 19 (24 @ 12:30) (12 - 27)  SpO2: 95% (24 @ 12:30) (92% - 100%)  Wt(kg): --    ----  INTAKE & OUTPUT:  I&O's Summary    2024 07:01  -  2024 07:00  --------------------------------------------------------  IN: 1881.5 mL / OUT: 2800 mL / NET: -918.5 mL        LABS:                        14.5   8.39  )-----------( 212      ( 2024 05:45 )             45.4         139  |  103  |  12  ----------------------------<  196<H>  4.4   |  29  |  1.40<H>    Ca    9.8      2024 05:45  Phos  3.0       Mg     2.0         TPro  7.0  /  Alb  3.1<L>  /  TBili  0.7  /  DBili  x   /  AST  24  /  ALT  24  /  AlkPhos  105      PTT - ( 2024 06:08 )  PTT:34.0 sec  Urinalysis Basic - ( 2024 05:45 )    Color: x / Appearance: x / SG: x / pH: x  Gluc: 196 mg/dL / Ketone: x  / Bili: x / Urobili: x   Blood: x / Protein: x / Nitrite: x   Leuk Esterase: x / RBC: x / WBC x   Sq Epi: x / Non Sq Epi: x / Bacteria: x      CAPILLARY BLOOD GLUCOSE      POCT Blood Glucose.: 153 mg/dL (2024 11:34)  POCT Blood Glucose.: 168 mg/dL (2024 05:20)  POCT Blood Glucose.: 150 mg/dL (2024 23:20)  POCT Blood Glucose.: 185 mg/dL (2024 17:11)          ----  DATA REVIEW:  Personally reviewed:  -Vital sign trends: The patient is afebrile, heart rates have been relatively stable, blood pressures have been soft at times thus requiring phenylephrine infusion to maintain MAP elevated  -Laboratory results: No leukocytosis, stable H&H, stable renal indices and electrolytes  -Radiology results: Sinus rhythm on today's EKG  -Microbio results: No recent microdata  -Consultant recommendations: Cardio recommendations reviewed: Was on amiodarone infusion but will plan for p.o. once he is able to take p.o..  Reviewed ICU recommendations: Remains n.p.o., titrating phenylephrine based on BP needs.  Pulm recommendations reviewed.  Follow-up neurology recommendations.            ----  ACTIVE MEDICATIONS (by system):  - CV - aMIOdarone Infusion 0.5 mG/Min IV Continuous <Continuous>  - CV - aspirin Suppository 300 milliGRAM(s) Rectal daily  - CV - phenylephrine    Infusion 0.2 MICROgram(s)/kG/Min IV Continuous <Continuous>  - ENDO - insulin lispro (ADMELOG) corrective regimen sliding scale   SubCutaneous every 6 hours  - ID - ciprofloxacin   IVPB 400 milliGRAM(s) IV Intermittent every 12 hours  - PULM - albuterol/ipratropium for Nebulization 3 milliLiter(s) Nebulizer every 6 hours  - PULM - buDESOnide    Inhalation Suspension 0.5 milliGRAM(s) Inhalation two times a day  - VTE PPX - heparin   Injectable 5000 Unit(s) SubCutaneous every 8 hours

## 2024-04-30 NOTE — PROGRESS NOTE ADULT - SUBJECTIVE AND OBJECTIVE BOX
[facility] - Génesis Villavicencio, Medina, Carroll, Kumar, Orlin Figueroa  Office Number:  [phone]    Patient resting comfortably in bed in NAD.  Laying flat with no respiratory distress.  Still on phenylephrine for permissive hypertension.  NO overnight events.  Offers no cardiac complaints.    F/U for:  CVA, AF    Telemetry:  SR    MEDICATIONS  (STANDING):  aMIOdarone Infusion 0.5 mG/Min (16.7 mL/Hr) IV Continuous <Continuous>  aspirin Suppository 300 milliGRAM(s) Rectal daily  buDESOnide    Inhalation Suspension 0.5 milliGRAM(s) Inhalation two times a day  ciprofloxacin   IVPB 400 milliGRAM(s) IV Intermittent every 12 hours  dextrose 10% Bolus 125 milliLiter(s) IV Bolus once  dextrose 5%. 1000 milliLiter(s) (100 mL/Hr) IV Continuous <Continuous>  dextrose 5%. 1000 milliLiter(s) (50 mL/Hr) IV Continuous <Continuous>  dextrose 50% Injectable 12.5 Gram(s) IV Push once  dextrose 50% Injectable 25 Gram(s) IV Push once  heparin   Injectable 5000 Unit(s) SubCutaneous every 8 hours  insulin lispro (ADMELOG) corrective regimen sliding scale   SubCutaneous every 6 hours  phenylephrine    Infusion 0.2 MICROgram(s)/kG/Min (7.58 mL/Hr) IV Continuous <Continuous>    MEDICATIONS  (PRN):  albuterol/ipratropium for Nebulization 3 milliLiter(s) Nebulizer every 6 hours PRN Shortness of Breath and/or Wheezing      Allergies    No Known Allergies        Vital Signs Last 24 Hrs  T(C): 37.5 (30 Apr 2024 07:57), Max: 37.8 (30 Apr 2024 00:06)  T(F): 99.5 (30 Apr 2024 07:57), Max: 100 (30 Apr 2024 00:06)  HR: 75 (30 Apr 2024 09:00) (64 - 96)  BP: 161/72 (30 Apr 2024 09:00) (128/68 - 217/88)  BP(mean): 108 (30 Apr 2024 09:00) (91 - 141)  RR: 20 (30 Apr 2024 09:00) (12 - 29)  SpO2: 99% (30 Apr 2024 09:00) (92% - 100%)    Parameters below as of 29 Apr 2024 21:09  Patient On (Oxygen Delivery Method): BiPAP/CPAP        I&O's Summary    29 Apr 2024 07:01  -  30 Apr 2024 07:00  --------------------------------------------------------  IN: 1881.5 mL / OUT: 2800 mL / NET: -918.5 mL        ON EXAM:    General: NAD, awake and alert, oriented x 3  HEENT: Mucous membranes are moist, anicteric  Lungs: Non-labored, breath sounds are clear bilaterally, No wheezing, rales or rhonchi  Cardiovascular: Regular, S1 and S2, no murmurs, rubs, or gallops  Gastrointestinal: Bowel Sounds present, soft, nontender.   Lymph: No peripheral edema. No lymphadenopathy.  Skin: No rashes or ulcers  Psych:  Mood & affect appropriate for clinical condition      LABS: All Labs Reviewed:                        14.5   8.39  )-----------( 212      ( 30 Apr 2024 05:45 )             45.4                         13.0   8.57  )-----------( 207      ( 29 Apr 2024 06:08 )             41.5                         13.8   9.20  )-----------( 242      ( 28 Apr 2024 09:00 )             43.6     30 Apr 2024 05:45    139    |  103    |  12     ----------------------------<  196    4.4     |  29     |  1.40   29 Apr 2024 06:08    139    |  103    |  10     ----------------------------<  196    3.8     |  28     |  1.40   28 Apr 2024 09:00    143    |  108    |  12     ----------------------------<  170    4.1     |  30     |  1.50     Ca    9.8        30 Apr 2024 05:45  Ca    9.3        29 Apr 2024 06:08  Ca    9.6        28 Apr 2024 09:00  Phos  3.0       30 Apr 2024 05:45  Phos  2.6       29 Apr 2024 06:08  Phos  2.4       28 Apr 2024 09:00  Mg     2.0       30 Apr 2024 05:45  Mg     1.6       29 Apr 2024 06:08  Mg     1.7       28 Apr 2024 09:00    TPro  7.0    /  Alb  3.1    /  TBili  0.7    /  DBili  x      /  AST  24     /  ALT  24     /  AlkPhos  105    30 Apr 2024 05:45  TPro  6.2    /  Alb  2.9    /  TBili  0.8    /  DBili  x      /  AST  18     /  ALT  21     /  AlkPhos  96     29 Apr 2024 06:08  TPro  6.5    /  Alb  3.0    /  TBili  0.6    /  DBili  x      /  AST  19     /  ALT  24     /  AlkPhos  102    28 Apr 2024 09:00    PTT - ( 29 Apr 2024 06:08 )  PTT:34.0 sec

## 2024-04-30 NOTE — PROGRESS NOTE ADULT - PROBLEM SELECTOR PLAN 4
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  - R big toe dressing c/d/i  - Start Cipro IV as patient NPO   - Per podiatry, nothing to culture at this time   - Continue local wound care   - ID following   - Podiatry following  - Plan per ICU

## 2024-04-30 NOTE — SOCIAL WORK PROGRESS NOTE - NSSWPROGRESSNOTE_GEN_ALL_CORE
PTE recommending Acute Rehab at this time, SW met with patient and wife/Sania at bedside in order to discuss recommendations. List given, choices obtained in order of preference: 1) [facility], 2) [facility]. Information sent. Awaiting response. Pt remains acute in the ICU not cleared yet per medical team. Will remain available and continue to follow up.

## 2024-04-30 NOTE — CASE MANAGEMENT PROGRESS NOTE - NSCMPROGRESSNOTE_GEN_ALL_CORE
Chart reviewed from a case management perspective.  Patient remains acute in ICU on amio gtt and IV phenyephrine.  Currently recommended for acute rehab when medically ready.  Will monitor for transition needs

## 2024-04-30 NOTE — PROGRESS NOTE ADULT - ATTENDING COMMENTS
Pt asking for recent neuro records to be faxed to PCP  Last 3 office notes and recent MRIs faxed to PCP (referring provider)  82 yo man with Hx eosinophilic asthma/COPD, chronic hypoxemic and hypercapnic respiratory failure, afib on eliquis, CAD, DM2, L femur Fx with chronic osto, admitted with uncontrolled afib, course complicated by acute L MCA CVA resulting in R hemiparesis and dysarthria.  Not a TNK candidate as he was on full AC.    --posterior L MCA CVA with multiple smaller acute b/l cerebral and cerebellar strokes  continue ASA  statin when have PO access  no AC yet due to large territory and risk of hemorrhage  continue induced htn with phenylephrine -180  slight improvement in R foot movement today  PT/OT/speech/swallow evals  --afib controlled on amio  --asthma, continue inhaled steroids, bronchodilators  due for nucala injection on 5/3, wife will bring in  chronic hypercapnic respiratory failure, continue BiPAP at night or with sleep  --CKD stable  --dysphagia, will likely need NGT, will discuss with pt  --chronic LE osteo, continue cipro  --DM2 controlled on ISS  --plan discussed with wife

## 2024-04-30 NOTE — PROGRESS NOTE ADULT - ASSESSMENT
83-year-old M with history of COPD on home O2 PRN, coronary artery disease s/p CABG,  hypertension, diabetes, hyperlipidemia, atrial fibrillation on Eliquis as well as chronic osteomyelitis who presents to the emergency department at [facility] complaining of rapid heart rate. Cardiology consulted for afib with rvr.    - Presenting with PAF with RVR  - At home he had noted some SOB, his wife checked his HR and noted it up was up to 160s so brought him to the ER, then placed on Cardizem gtt, had missed home BB   - on the morning of 4/27, patient was noted to have new onset aphasia and a code stroke was called.  He was deemed not a candidate for TNK as he is on AC.  He was found to have a L M3 occlusion but was deemed not a candidate for thrombectomy as occlusion too distal.  Later that night, a RRT was called due to worsening NIH score noted by nursing.  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   - Continue phenylephrine to maintain elevated MAP to optimize cerebral perfusion  - Now back in NSR.  Was on amio gtt.  To start PO when able to take po.    - Was on full dose ac with lovenox, though held because of risk of hemorrhagic conversion. Patient reports that he has missed doses of Eliquis at home.  Would not deem this eliquis failure  - BB on hold for permissive hypertension, though can restart if neuro ok with it.  - Continue aspirin supp  - resume statin when able  - Continue telemetry  - Monitor and replete electrolytes. Keep K>4.0 and Mg>2.0.  - remains on yolande, with high risk of decompensation.

## 2024-04-30 NOTE — PROGRESS NOTE ADULT - PROBLEM SELECTOR PLAN 5
MERRILL on admission. Baseline Cr around 1.1  - On admission Cr 1.6  - Continue IVF  - Avoid nephrotoxics - hold home furosemide for now  - Renal indices have been stable

## 2024-04-30 NOTE — PROGRESS NOTE ADULT - SUBJECTIVE AND OBJECTIVE BOX
Date/Time Patient Seen:  		  Referring MD:   Data Reviewed	       Patient is a 83y old  Male who presents with a chief complaint of AFib w/ RVR (29 Apr 2024 16:24)      Subjective/HPI     PAST MEDICAL & SURGICAL HISTORY:  Diabetes Mellitus, Type II    CAD (Coronary Artery Disease)  s/p 3v CABG 2004; stents placed in [place] in 2019    Dyslipidemia    Asymptomatic Peripheral Vascular Disease    Osteomyelitis    COPD (chronic obstructive pulmonary disease)  on 2L at home and BiPAP at night; intubated 6/18    Diabetes mellitus    Hypertension    PVD (peripheral vascular disease)    History of PAT (paroxysmal atrial tachycardia)    Asthma with COPD    BPH (benign prostatic hyperplasia)    Acute osteomyelitis    CABG (Coronary Artery Bypass Graft)  2004    Compound fracture  left leg    S/P primary angioplasty with coronary stent    H/O drainage of abscess  Left femur 12/2021          Medication list         MEDICATIONS  (STANDING):  aMIOdarone Infusion 0.5 mG/Min (16.7 mL/Hr) IV Continuous <Continuous>  aspirin Suppository 300 milliGRAM(s) Rectal daily  buDESOnide    Inhalation Suspension 0.5 milliGRAM(s) Inhalation two times a day  ciprofloxacin   IVPB 400 milliGRAM(s) IV Intermittent every 12 hours  dextrose 10% Bolus 125 milliLiter(s) IV Bolus once  dextrose 5%. 1000 milliLiter(s) (100 mL/Hr) IV Continuous <Continuous>  dextrose 5%. 1000 milliLiter(s) (50 mL/Hr) IV Continuous <Continuous>  dextrose 50% Injectable 12.5 Gram(s) IV Push once  dextrose 50% Injectable 25 Gram(s) IV Push once  heparin   Injectable 5000 Unit(s) SubCutaneous every 8 hours  insulin lispro (ADMELOG) corrective regimen sliding scale   SubCutaneous every 6 hours  phenylephrine    Infusion 0.2 MICROgram(s)/kG/Min (7.58 mL/Hr) IV Continuous <Continuous>    MEDICATIONS  (PRN):  albuterol/ipratropium for Nebulization 3 milliLiter(s) Nebulizer every 6 hours PRN Shortness of Breath and/or Wheezing         Vitals log        ICU Vital Signs Last 24 Hrs  T(C): 37.7 (30 Apr 2024 03:57), Max: 37.8 (30 Apr 2024 00:06)  T(F): 99.9 (30 Apr 2024 03:57), Max: 100 (30 Apr 2024 00:06)  HR: 64 (30 Apr 2024 04:00) (64 - 96)  BP: 158/56 (30 Apr 2024 04:00) (128/68 - 217/88)  BP(mean): 99 (30 Apr 2024 04:00) (91 - 141)  ABP: --  ABP(mean): --  RR: 15 (30 Apr 2024 04:00) (12 - 29)  SpO2: 100% (30 Apr 2024 04:00) (92% - 100%)    O2 Parameters below as of 29 Apr 2024 21:09  Patient On (Oxygen Delivery Method): BiPAP/CPAP                 Input and Output:  I&O's Detail    28 Apr 2024 07:01  -  29 Apr 2024 07:00  --------------------------------------------------------  IN:    Amiodarone: 399.7 mL    IV PiggyBack: 100 mL    IV PiggyBack: 312.5 mL    IV PiggyBack: 400 mL    Lactated Ringers: 2400 mL    Phenylephrine: 862.7 mL  Total IN: 4474.9 mL    OUT:    Incontinent per Condom Catheter (mL): 1200 mL    Voided (mL): 900 mL  Total OUT: 2100 mL    Total NET: 2374.9 mL      29 Apr 2024 07:01  -  30 Apr 2024 05:47  --------------------------------------------------------  IN:    Amiodarone: 267.2 mL    IV PiggyBack: 50 mL    IV PiggyBack: 200 mL    Lactated Ringers: 400 mL    Phenylephrine: 820.8 mL  Total IN: 1738 mL    OUT:    Incontinent per Condom Catheter (mL): 2300 mL  Total OUT: 2300 mL    Total NET: -562 mL          Lab Data                        13.0   8.57  )-----------( 207      ( 29 Apr 2024 06:08 )             41.5     04-29    139  |  103  |  10  ----------------------------<  196<H>  3.8   |  28  |  1.40<H>    Ca    9.3      29 Apr 2024 06:08  Phos  2.6     04-29  Mg     1.6     04-29    TPro  6.2  /  Alb  2.9<L>  /  TBili  0.8  /  DBili  x   /  AST  18  /  ALT  21  /  AlkPhos  96  04-29            Review of Systems	      Objective     Physical Examination  heart s1s2  lung dc BS  head nc        Pertinent Lab findings & Imaging      Eliecer:  NO   Adequate UO     I&O's Detail    28 Apr 2024 07:01  -  29 Apr 2024 07:00  --------------------------------------------------------  IN:    Amiodarone: 399.7 mL    IV PiggyBack: 100 mL    IV PiggyBack: 312.5 mL    IV PiggyBack: 400 mL    Lactated Ringers: 2400 mL    Phenylephrine: 862.7 mL  Total IN: 4474.9 mL    OUT:    Incontinent per Condom Catheter (mL): 1200 mL    Voided (mL): 900 mL  Total OUT: 2100 mL    Total NET: 2374.9 mL      29 Apr 2024 07:01  -  30 Apr 2024 05:47  --------------------------------------------------------  IN:    Amiodarone: 267.2 mL    IV PiggyBack: 50 mL    IV PiggyBack: 200 mL    Lactated Ringers: 400 mL    Phenylephrine: 820.8 mL  Total IN: 1738 mL    OUT:    Incontinent per Condom Catheter (mL): 2300 mL  Total OUT: 2300 mL    Total NET: -562 mL               Discussed with:     Cultures:	        Radiology

## 2024-04-30 NOTE — PROGRESS NOTE ADULT - ASSESSMENT
83 year old male with a PMH of eosinophilic asthma/COPD, former smoker, chronic hypoxemic and hypercapnic respiratory failure on nocturnal BiPAP, afib on Eliquis, CAD s/p CABG and PCI, HTN, HLD, DM2, L femur fracture with chronic OM who presented to the ED from home with complaints of SOB and tachycardia.      Neuro:   - s/p L MCA ischemic stroke w/ b/l embolic infarcts with no hemorrhagic conversion  - c/w phenylephrine to maintain -180  - c/w ASA suppository, hold home statin as pt NPO  - Will repeat CT when neuro recs  - c/w PT/OT, failed bedside swallow    Cardio:  - c/w amio gtt for afib, Currently NSR  - c/w phenylephrine to maintain SBP >160  - BB on hold for permissive HTN    Pulm:   - Hx COPD, COURTNEY, c/w nocturnal BiPAP   - Standing duonebs, budesonide    GI:  - NPO  - Wife declined NGT, will need to re-discuss     Renal:   - No acute needs  - Replete lytes PRN    ID:   - c/w home cipro for suppression of chronic osteomyelitis  - Local wound care for chronic R big toe wound     Endocrine:   - LDISS q6h while NPO    Heme:   - c/w HSQ q8h per neuro recs 2/2 hemorrhagic conversion risk with full AC    #GOC: Full Code     Charting in Progress    83 year old male with a PMH of eosinophilic asthma/COPD, former smoker, chronic hypoxemic and hypercapnic respiratory failure on nocturnal BiPAP, afib on Eliquis, CAD s/p CABG and PCI, HTN, HLD, DM2, L femur fracture with chronic OM who presented to the ED from home with complaints of SOB and tachycardia.      Neuro:   - s/p L MCA ischemic stroke w/ b/l embolic infarcts with no hemorrhagic conversion  - c/w phenylephrine to maintain -180  - c/w ASA suppository, hold home statin as pt NPO  - Will repeat CT when neuro recs  - c/w PT/OT, failed bedside swallow    Cardio:  - c/w amio gtt for afib, Currently NSR  - c/w phenylephrine to maintain SBP >160  - BB on hold for permissive HTN    Pulm:   - Hx COPD, COURTNEY, c/w nocturnal BiPAP   - Standing duonebs, budesonide    GI:  - NPO  - Wife declined NGT, will need to re-discuss     Renal:   - No acute needs  - Replete lytes PRN    ID:   - c/w home cipro for suppression of chronic osteomyelitis  - Local wound care for chronic R big toe wound     Endocrine:   - LDISS q6h while NPO    Heme:   - c/w HSQ q8h per neuro recs 2/2 hemorrhagic conversion risk with full AC    #GOC: Full Code     Charting in Progress    83 year old male with a PMH of eosinophilic asthma/COPD, former smoker, chronic hypoxemic and hypercapnic respiratory failure on nocturnal BiPAP, afib on Eliquis, CAD s/p CABG and PCI, HTN, HLD, DM2, L femur fracture with chronic OM who presented to the ED from home with complaints of SOB and tachycardia.      Neuro:   - s/p L MCA ischemic stroke w/ b/l embolic infarcts with no hemorrhagic conversion  - c/w phenylephrine to maintain -180  - c/w ASA suppository, hold home statin as pt NPO  - Will repeat CT when neuro recs  - c/w PT/OT, failed bedside swallow    Cardio:  - c/w amio gtt for afib, Currently NSR  - c/w phenylephrine to maintain SBP >160  - BB on hold for permissive HTN    Pulm:   - Hx COPD, COURTNEY, c/w nocturnal BiPAP   - Standing duonebs, budesonide    GI:  - NPO  - Wife declined NGT, will need to re-discuss     Renal:   - No acute needs  - Replete lytes PRN    ID:   - c/w home cipro for suppression of chronic osteomyelitis  - Local wound care for chronic R big toe wound     Endocrine:   - LDISS q6h while NPO    Heme:   - c/w HSQ q8h per neuro recs 2/2 hemorrhagic conversion risk with full AC    #GOC: Full Code    #Dispo: PT rec acute rehab on dispo, wife considering [place]   83 year old male with a PMH of eosinophilic asthma/COPD, former smoker, chronic hypoxemic and hypercapnic respiratory failure on nocturnal BiPAP, afib on Eliquis, CAD s/p CABG and PCI, HTN, HLD, DM2, L femur fracture with chronic OM who presented to the ED from home with complaints of SOB and tachycardia.      Neuro:   - s/p L MCA ischemic stroke w/ b/l embolic infarcts with no hemorrhagic conversion  - c/w phenylephrine to maintain -180, will talk to neuro about titrating BP goals  - c/w ASA suppository, hold home statin as pt NPO  - c/w PT/OT/Speech therapy, repeat swallow to re-evaluate need for NGT    Cardio:  - c/w amio gtt for afib, Currently NSR. Can transition to PO/via NGT pending swallow recs  - c/w phenylephrine to maintain SBP >160,  will talk to neuro about titrating BP goals  - BB on hold for permissive HTN  - f/u repeat EKG for QTc prolongation in setting of amio gtt and ciprofloxacin IV    Pulm:   - Hx COPD, COURTNEY, c/w nocturnal BiPAP   - Standing duonebs, budesonide    GI:  - NPO, repeat swallow to re-evaluate need for NGT    Renal:   - No acute needs  - Replete lytes PRN    ID:   - c/w home cipro for suppression of chronic osteomyelitis  - Local wound care for chronic R big toe wound     Endocrine:   - LDISS q6h while NPO    Heme:   - c/w HSQ q8h per neuro recs 2/2 hemorrhagic conversion risk with full AC    #GOC: Full Code    #Dispo: PT rec acute rehab on dispo, wife considering [place]

## 2024-04-30 NOTE — PROGRESS NOTE ADULT - PROBLEM SELECTOR PLAN 1
- MRI Head: Multiple acute infarcts scattered throughout the bilateral cerebral and cerebellar hemispheres. More dominant acute to subacute infarction in the posterior left MCA vascular distribution. Chronic small vessel disease.  - No evidence of hemorrhagic conversion  - Therapeutic anticoagulation remains on hold but will follow-up neurology's recommendations regarding when to resume  - Maintains on a phenylephrine infusion to elevate MAP  - Continue aspirin suppository  - Continue phenylephrine gtt to maintain -170 for permissive HTN   - Neuro checks q4h  - NPO pending speech and swallow -plan for follow-up evaluation today.  If not able to advance diet will consider placing NG tube for feeds.  Nutrition recommendations reviewed and appreciated  - Fall and aspiration precautions, PT/OT/Speech and Swallow eval   - Plan per ICU

## 2024-04-30 NOTE — PROGRESS NOTE ADULT - ASSESSMENT
83-year-old  black male with history of COPD coronary artery disease hypertension diabetes hyperlipidemia atrial fibrillation on Eliquis as well as osteomyelitis who presents now to the emergency department at [facility] complaining of rapid heart rate    jacy  copd  AF  OP  OA  CAD  HTN  DM  HLD  OM hx  CVA     remains NPO  remains in ICU  SW eval noted    follows with Dr Ryland ashley med  uses NIV - BIPAP night time for JACY - sleep hygiene reviewed  cardio eval - cvs rx regimen  BP control  DM care  AF rate and rhythm control  TFT  outpatient record reviewed  proventil PRN - Singulair - copd  spoke with WIFE  on emp ABX   wound care

## 2024-04-30 NOTE — PROGRESS NOTE ADULT - SUBJECTIVE AND OBJECTIVE BOX
Interval Events: Pt seen and examined at bedside. No acute events overnight. Pt is requesting to get up with PT, and wants to get stronger. He expresses frustration in not knowing when or if he will ever regain his strength. Pt remains on phenylephrine to maintain SBP >160.    Review of Systems:  Constitutional: No fever  Neuro: +weakness. No headache, numbness  Resp: No shortness of breath  CVS: No chest pain  GI: No abdominal pain, nausea    ICU Vital Signs Last 24 Hrs  T(C): 37.5 (30 Apr 2024 07:57), Max: 37.8 (30 Apr 2024 00:06)  T(F): 99.5 (30 Apr 2024 07:57), Max: 100 (30 Apr 2024 00:06)  HR: 74 (30 Apr 2024 07:30) (64 - 96)  BP: 170/79 (30 Apr 2024 07:30) (128/68 - 217/88)  BP(mean): 113 (30 Apr 2024 07:30) (91 - 141)  ABP: --  ABP(mean): --  RR: 22 (30 Apr 2024 07:30) (12 - 29)  SpO2: 100% (30 Apr 2024 07:30) (92% - 100%)    O2 Parameters below as of 29 Apr 2024 21:09  Patient On (Oxygen Delivery Method): BiPAP/CPAP              04-29-24 @ 07:01  -  04-30-24 @ 07:00  --------------------------------------------------------  IN: 1881.5 mL / OUT: 2800 mL / NET: -918.5 mL        CAPILLARY BLOOD GLUCOSE      POCT Blood Glucose.: 168 mg/dL (30 Apr 2024 05:20)      I&O's Summary    29 Apr 2024 07:01  -  30 Apr 2024 07:00  --------------------------------------------------------  IN: 1881.5 mL / OUT: 2800 mL / NET: -918.5 mL        Physical Exam:   Gen: Awake, conversive. Laying in bed comfortably  Neuro: +R-sided hemiparesis in b/l upper and lower extremities. +Appears to have slight movement in toes on RLE. +aphasia and dysarthria, however comprehension intact. PERRLA.   HEENT: NC/AT, +L cataract, + R sided facial droop  Resp: CTA B/L. No accessory muscle use. No wheezing. +Poor inspiratory effort  CVS: RRR. +S1/S2. No murmurs  Abd: Soft. NT/ND. +BS  Ext: No edema. +Chronic R big toe wound w/ associated desquamation of skin, no erythema or drainage  MSK: Strength 0/5 in RUE and RLE    Meds:  ciprofloxacin   IVPB IV Intermittent    aMIOdarone Infusion IV Continuous  phenylephrine    Infusion IV Continuous    dextrose 50% Injectable IV Push  dextrose 50% Injectable IV Push  insulin lispro (ADMELOG) corrective regimen sliding scale SubCutaneous    albuterol/ipratropium for Nebulization Nebulizer PRN  buDESOnide    Inhalation Suspension Inhalation    aspirin Suppository Rectal      heparin   Injectable SubCutaneous        dextrose 10% Bolus IV Bolus  dextrose 5%. IV Continuous  dextrose 5%. IV Continuous                                  14.5   8.39  )-----------( 212      ( 30 Apr 2024 05:45 )             45.4       04-30    139  |  103  |  12  ----------------------------<  196<H>  4.4   |  29  |  1.40<H>    Ca    9.8      30 Apr 2024 05:45  Phos  3.0     04-30  Mg     2.0     04-30    TPro  7.0  /  Alb  3.1<L>  /  TBili  0.7  /  DBili  x   /  AST  24  /  ALT  24  /  AlkPhos  105  04-30          PTT - ( 29 Apr 2024 06:08 )  PTT:34.0 sec  Urinalysis Basic - ( 30 Apr 2024 05:45 )    Color: x / Appearance: x / SG: x / pH: x  Gluc: 196 mg/dL / Ketone: x  / Bili: x / Urobili: x   Blood: x / Protein: x / Nitrite: x   Leuk Esterase: x / RBC: x / WBC x   Sq Epi: x / Non Sq Epi: x / Bacteria: x                  Radiology:    Bedside Ultrasound:    Tubes/Lines:      GLOBAL ISSUE/BEST PRACTICE:  Analgesia:  Sedation:  HOB elevation: Y  Stress ulcer prophylaxis:  VTE prophylaxis:  Glycemic control:  Nutrition:    CODE STATUS:        Charting in Progress      Interval Events: Pt seen and examined at bedside. No acute events overnight. Pt is requesting to get up with PT, and wants to get stronger. He expresses frustration in not knowing when or if he will ever regain his strength. Pt remains on phenylephrine to maintain SBP >160.    Review of Systems:  Constitutional: No fever  Neuro: +weakness. No headache, numbness  Resp: No shortness of breath  CVS: No chest pain  GI: No abdominal pain, nausea    ICU Vital Signs Last 24 Hrs  T(C): 37.5 (30 Apr 2024 07:57), Max: 37.8 (30 Apr 2024 00:06)  T(F): 99.5 (30 Apr 2024 07:57), Max: 100 (30 Apr 2024 00:06)  HR: 74 (30 Apr 2024 07:30) (64 - 96)  BP: 170/79 (30 Apr 2024 07:30) (128/68 - 217/88)  BP(mean): 113 (30 Apr 2024 07:30) (91 - 141)  ABP: --  ABP(mean): --  RR: 22 (30 Apr 2024 07:30) (12 - 29)  SpO2: 100% (30 Apr 2024 07:30) (92% - 100%)    O2 Parameters below as of 29 Apr 2024 21:09  Patient On (Oxygen Delivery Method): BiPAP/CPAP              04-29-24 @ 07:01  -  04-30-24 @ 07:00  --------------------------------------------------------  IN: 1881.5 mL / OUT: 2800 mL / NET: -918.5 mL        CAPILLARY BLOOD GLUCOSE      POCT Blood Glucose.: 168 mg/dL (30 Apr 2024 05:20)      I&O's Summary    29 Apr 2024 07:01  -  30 Apr 2024 07:00  --------------------------------------------------------  IN: 1881.5 mL / OUT: 2800 mL / NET: -918.5 mL        Physical Exam:   Gen: Awake, conversive. Laying in bed comfortably  Neuro: +R-sided hemiparesis in b/l upper and lower extremities. +Appears to have slight movement in toes on RLE. +aphasia and dysarthria, however comprehension intact. PERRLA.   HEENT: NC/AT, +L cataract, + R sided facial droop  Resp: CTA B/L. No accessory muscle use. No wheezing. +Poor inspiratory effort  CVS: RRR. +S1/S2. No murmurs  Abd: Soft. NT/ND. +BS  Ext: No edema. +Chronic R big toe wound w/ associated desquamation of skin, no erythema or drainage  MSK: Strength 0/5 in RUE and RLE    Meds:  ciprofloxacin   IVPB IV Intermittent    aMIOdarone Infusion IV Continuous  phenylephrine    Infusion IV Continuous    dextrose 50% Injectable IV Push  dextrose 50% Injectable IV Push  insulin lispro (ADMELOG) corrective regimen sliding scale SubCutaneous    albuterol/ipratropium for Nebulization Nebulizer PRN  buDESOnide    Inhalation Suspension Inhalation    aspirin Suppository Rectal      heparin   Injectable SubCutaneous        dextrose 10% Bolus IV Bolus  dextrose 5%. IV Continuous  dextrose 5%. IV Continuous                                  14.5   8.39  )-----------( 212      ( 30 Apr 2024 05:45 )             45.4       04-30    139  |  103  |  12  ----------------------------<  196<H>  4.4   |  29  |  1.40<H>    Ca    9.8      30 Apr 2024 05:45  Phos  3.0     04-30  Mg     2.0     04-30    TPro  7.0  /  Alb  3.1<L>  /  TBili  0.7  /  DBili  x   /  AST  24  /  ALT  24  /  AlkPhos  105  04-30          PTT - ( 29 Apr 2024 06:08 )  PTT:34.0 sec  Urinalysis Basic - ( 30 Apr 2024 05:45 )    Color: x / Appearance: x / SG: x / pH: x  Gluc: 196 mg/dL / Ketone: x  / Bili: x / Urobili: x   Blood: x / Protein: x / Nitrite: x   Leuk Esterase: x / RBC: x / WBC x   Sq Epi: x / Non Sq Epi: x / Bacteria: x                  Radiology:    Bedside Ultrasound:    Tubes/Lines:      GLOBAL ISSUE/BEST PRACTICE:  Analgesia:  Sedation:  HOB elevation: Y  Stress ulcer prophylaxis:  VTE prophylaxis:  Glycemic control:  Nutrition:    CODE STATUS:        Interval Events: Pt seen and examined at bedside. No acute events overnight. Pt is requesting to get up with PT, and wants to get stronger. He expresses frustration in not knowing when or if he will ever regain his strength. Pt remains on phenylephrine to maintain SBP >160.    Review of Systems:  Constitutional: No fever  Neuro: +weakness. No headache, numbness  Resp: No shortness of breath  CVS: No chest pain  GI: No abdominal pain, nausea    ICU Vital Signs Last 24 Hrs  T(C): 37.5 (30 Apr 2024 07:57), Max: 37.8 (30 Apr 2024 00:06)  T(F): 99.5 (30 Apr 2024 07:57), Max: 100 (30 Apr 2024 00:06)  HR: 74 (30 Apr 2024 07:30) (64 - 96)  BP: 170/79 (30 Apr 2024 07:30) (128/68 - 217/88)  BP(mean): 113 (30 Apr 2024 07:30) (91 - 141)  ABP: --  ABP(mean): --  RR: 22 (30 Apr 2024 07:30) (12 - 29)  SpO2: 100% (30 Apr 2024 07:30) (92% - 100%)    O2 Parameters below as of 29 Apr 2024 21:09  Patient On (Oxygen Delivery Method): BiPAP/CPAP              04-29-24 @ 07:01  -  04-30-24 @ 07:00  --------------------------------------------------------  IN: 1881.5 mL / OUT: 2800 mL / NET: -918.5 mL        CAPILLARY BLOOD GLUCOSE      POCT Blood Glucose.: 168 mg/dL (30 Apr 2024 05:20)      I&O's Summary    29 Apr 2024 07:01  -  30 Apr 2024 07:00  --------------------------------------------------------  IN: 1881.5 mL / OUT: 2800 mL / NET: -918.5 mL        Physical Exam:   Gen: Awake, conversive. Laying in bed comfortably  Neuro: +R-sided hemiparesis in b/l upper and lower extremities. +Appears to have slight movement in toes on RLE. +aphasia and dysarthria, however comprehension intact. PERRLA.   HEENT: NC/AT, +L cataract, + R sided facial droop  Resp: CTA B/L. No accessory muscle use. No wheezing. +Poor inspiratory effort  CVS: RRR. +S1/S2. No murmurs  Abd: Soft. NT/ND. +BS  Ext: No edema. +Chronic R big toe wound w/ associated desquamation of skin, no erythema or drainage  MSK: Strength 0/5 in RUE and RLE    Meds:  ciprofloxacin   IVPB IV Intermittent    aMIOdarone Infusion IV Continuous  phenylephrine    Infusion IV Continuous    dextrose 50% Injectable IV Push  dextrose 50% Injectable IV Push  insulin lispro (ADMELOG) corrective regimen sliding scale SubCutaneous    albuterol/ipratropium for Nebulization Nebulizer PRN  buDESOnide    Inhalation Suspension Inhalation    aspirin Suppository Rectal      heparin   Injectable SubCutaneous        dextrose 10% Bolus IV Bolus  dextrose 5%. IV Continuous  dextrose 5%. IV Continuous                                  14.5   8.39  )-----------( 212      ( 30 Apr 2024 05:45 )             45.4       04-30    139  |  103  |  12  ----------------------------<  196<H>  4.4   |  29  |  1.40<H>    Ca    9.8      30 Apr 2024 05:45  Phos  3.0     04-30  Mg     2.0     04-30    TPro  7.0  /  Alb  3.1<L>  /  TBili  0.7  /  DBili  x   /  AST  24  /  ALT  24  /  AlkPhos  105  04-30          PTT - ( 29 Apr 2024 06:08 )  PTT:34.0 sec  Urinalysis Basic - ( 30 Apr 2024 05:45 )    Color: x / Appearance: x / SG: x / pH: x  Gluc: 196 mg/dL / Ketone: x  / Bili: x / Urobili: x   Blood: x / Protein: x / Nitrite: x   Leuk Esterase: x / RBC: x / WBC x   Sq Epi: x / Non Sq Epi: x / Bacteria: x          Tubes/Lines: none      GLOBAL ISSUE/BEST PRACTICE:  Analgesia: Y   Sedation: NA  HOB elevation: Y  Stress ulcer prophylaxis: NA  VTE prophylaxis: Y  Glycemic control: Y  Nutrition: NPO    CODE STATUS:  FULL

## 2024-04-30 NOTE — PHARMACOTHERAPY INTERVENTION NOTE - COMMENTS
Amiodarone order of 0.5mg/min x48h has completed.  Discussed on rounds.  Continue IV until able to convert to PO load per discussion on rounds.  Restarted 0.5mg/min x24h and can be reassessed at the latest 5/1 during rounds for PO conversion if desired to continue.  Also recommended ekg for QTc monitoring since also on ciprofloxacin (QTc prolonging agent). Ordered by resident Nostro.

## 2024-05-01 NOTE — PROGRESS NOTE ADULT - PROBLEM SELECTOR PLAN 2
History of Afib, on home Metoprolol BID and Eliquis   - TTE 4/26/2024: LV no well visualized however LV probably normal based on limited views, moderate thickening of aortic valve leaflets, trace TR, dilated IVC with normal inspiratory collapse, normal pulmonary artery pressure  - NG tube placed for meds and feeds  - Consider resumption of therapeutic anticoagulation pending neurology's recommendations  - Hold home BB to allow for permissive HTN   - Transition to amnio via NG tube  - Telemetry monitoring, electrolyte monitoring  - Plan per ICU

## 2024-05-01 NOTE — PROGRESS NOTE ADULT - SUBJECTIVE AND OBJECTIVE BOX
[facility]    Génesis Villavicencio, [place], [place], [place], [place]      [phone]    CHIEF COMPLAINT: Patient is a 83y old  Male who presents with a chief complaint of AFib w/ RVR (01 May 2024 10:05)      Follow Up:  rapid af, cva    Interim history: Unable to provide a history on the basis of a poor mental status.  Events noted. remains on phenylephrine top maintain perfusion pressure     MEDICATIONS  (STANDING):  albuterol/ipratropium for Nebulization 3 milliLiter(s) Nebulizer every 6 hours  aMIOdarone Infusion 0.5 mG/Min (16.7 mL/Hr) IV Continuous <Continuous>  aspirin Suppository 300 milliGRAM(s) Rectal daily  atorvastatin 80 milliGRAM(s) Oral at bedtime  buDESOnide    Inhalation Suspension 0.5 milliGRAM(s) Inhalation two times a day  ciprofloxacin   IVPB 400 milliGRAM(s) IV Intermittent every 12 hours  dextrose 10% Bolus 125 milliLiter(s) IV Bolus once  dextrose 5%. 1000 milliLiter(s) (100 mL/Hr) IV Continuous <Continuous>  dextrose 5%. 1000 milliLiter(s) (50 mL/Hr) IV Continuous <Continuous>  dextrose 50% Injectable 12.5 Gram(s) IV Push once  dextrose 50% Injectable 25 Gram(s) IV Push once  heparin   Injectable 5000 Unit(s) SubCutaneous every 8 hours  insulin lispro (ADMELOG) corrective regimen sliding scale   SubCutaneous every 6 hours  pantoprazole  Injectable 40 milliGRAM(s) IV Push daily  phenylephrine    Infusion 0.2 MICROgram(s)/kG/Min (7.58 mL/Hr) IV Continuous <Continuous>    MEDICATIONS  (PRN):  acetaminophen   Oral Liquid .. 650 milliGRAM(s) Oral every 6 hours PRN Temp greater or equal to 38.5C (101.3F)      REVIEW OF SYSTEMS: unable to provide  Vital Signs Last 24 Hrs  T(C): 36.8 (01 May 2024 08:12), Max: 38.4 (30 Apr 2024 23:00)  T(F): 98.2 (01 May 2024 08:12), Max: 101.1 (30 Apr 2024 23:00)  HR: 75 (01 May 2024 10:00) (69 - 104)  BP: 175/77 (01 May 2024 10:00) (136/65 - 194/81)  BP(mean): 110 (01 May 2024 10:00) (93 - 139)  RR: 20 (01 May 2024 10:00) (17 - 29)  SpO2: 97% (01 May 2024 10:00) (95% - 100%)    Parameters below as of 01 May 2024 07:30  Patient On (Oxygen Delivery Method): nasal cannula        I&O's Summary    30 Apr 2024 07:01  -  01 May 2024 07:00  --------------------------------------------------------  IN: 2173.2 mL / OUT: 2300 mL / NET: -126.8 mL    01 May 2024 07:01  -  01 May 2024 10:18  --------------------------------------------------------  IN: 175.2 mL / OUT: 0 mL / NET: 175.2 mL        Telemetry past 24h:  sr    PHYSICAL EXAM:    Constitutional: well-nourished, well-developed, NAD   HEENT:  ngt, sclerae anicteric, conjunctivae clear, no oral cyanosis.  Pulmonary: Non-labored, breath sounds are clear bilaterally, No wheezing, rales or rhonchi  Cardiovascular: Regular, S1 and S2.  No murmur.  No rubs, gallops or clicks  Gastrointestinal: Bowel Sounds present, soft, nontender.   Lymph: No peripheral edema.   Neurological: unable to evaluate, poor mental status  Skin: No rashes.  Psych:  Mood & affect not evaluable    LABS: All Labs Reviewed:                        13.5   9.57  )-----------( 216      ( 01 May 2024 05:29 )             42.0                         14.5   8.39  )-----------( 212      ( 30 Apr 2024 05:45 )             45.4                         13.0   8.57  )-----------( 207      ( 29 Apr 2024 06:08 )             41.5     01 May 2024 05:29    138    |  104    |  16     ----------------------------<  223    4.2     |  27     |  1.50   30 Apr 2024 05:45    139    |  103    |  12     ----------------------------<  196    4.4     |  29     |  1.40   29 Apr 2024 06:08    139    |  103    |  10     ----------------------------<  196    3.8     |  28     |  1.40     Ca    9.2        01 May 2024 05:29  Ca    9.8        30 Apr 2024 05:45  Ca    9.3        29 Apr 2024 06:08  Phos  3.3       01 May 2024 05:29  Phos  3.0       30 Apr 2024 05:45  Phos  2.6       29 Apr 2024 06:08  Mg     1.8       01 May 2024 05:29  Mg     2.0       30 Apr 2024 05:45  Mg     1.6       29 Apr 2024 06:08    TPro  7.0    /  Alb  3.1    /  TBili  0.7    /  DBili  x      /  AST  24     /  ALT  24     /  AlkPhos  105    30 Apr 2024 05:45  TPro  6.2    /  Alb  2.9    /  TBili  0.8    /  DBili  x      /  AST  18     /  ALT  21     /  AlkPhos  96     29 Apr 2024 06:08          Blood Culture:         RADIOLOGY:    EKG:    Echo:

## 2024-05-01 NOTE — PROGRESS NOTE ADULT - SUBJECTIVE AND OBJECTIVE BOX
Name: YUE COTTO  MRN: [id number]  LOCATION: [address]    ----  Patient is a 83y old  Male who presents with a chief complaint of AFib w/ RVR (01 May 2024 14:32)      FROM ADMISSION H+P:   HPI:  83yoM PMHx AFib on Eliquis, CAD, HTN, DM, HLD, COPD, osteomyelitis presents c/o shortness of breath, chest discomfort. Pt reports onset of rapid heart rate this morning, HR measured 160s when he checked his pulse ox. Also endorses dyspnea exacerbated by movement     ----  INTERVAL HPI/OVERNIGHT EVENTS: Pt seen and evaluated at the bedside. No acute overnight events occurred.  The patient's movements and motor strength have continued to slowly improve.  His speech remains garbled.  He is a limited historian because he has a difficult time expressing his needs.  The patient had NG tube placed for nutrition as he has not been able to pass swallow evaluations yet.  Patient also resumed on Eliquis today as the follow-up CT showed no acute bleeding.    ----  PAST MEDICAL & SURGICAL HISTORY:  Diabetes Mellitus, Type II      CAD (Coronary Artery Disease)  s/p 3v CABG ; stents placed in [place] in       Dyslipidemia      Osteomyelitis      COPD (chronic obstructive pulmonary disease)  on 2L at home and BiPAP at night; intubated       Hypertension      PVD (peripheral vascular disease)      History of PAT (paroxysmal atrial tachycardia)      Asthma with COPD      BPH (benign prostatic hyperplasia)      Acute osteomyelitis      CABG (Coronary Artery Bypass Graft)        Compound fracture  left leg      S/P primary angioplasty with coronary stent      H/O drainage of abscess  Left femur 2021          FAMILY HISTORY:  Family history of diabetes mellitus (Sibling)    Family hx of lung cancer  brother,  age 82, used to smoke with pt        Allergies    No Known Allergies    Intolerances        ----  REVIEW OF SYSTEMS:  I am not able to obtain comprehensive reliable review of systems due to the patient's word finding difficulties but generally the patient denies any active complaints.       ----  PHYSICAL EXAM:  GENERAL: patient appears advanced age, nontoxic-appearing  ENMT: oropharynx clear without erythema, dry mucous membranes, right sided facial drooping  LUNGS: Reduced air entry, no wheezing, coarse breath sounds throughout  HEART: S1/S2, regular rate and irregular rhythm, distant heart sounds  GASTROINTESTINAL: abdomen is soft, nontender, nondistended, normoactive bowel sounds, no palpable masses  MUSCULOSKELETAL: no clubbing or cyanosis, no obvious deformity  INTEGUMENT: Chronic wound to the hallux  NEUROLOGIC: awake, alert, readily interactive, right-sided weakness, word finding difficulties, speech slightly more difficult to understand    T(C): 37.8 (24 @ 04:01), Max: 38.2 (24 @ 00:04)  HR: 90 (24 @ 05:30) (69 - 101)  BP: 180/86 (24 @ 05:30) (112/72 - 203/78)  RR: 28 (24 @ 05:30) (16 - 31)  SpO2: 95% (24 @ 05:30) (94% - 100%)  Wt(kg): --    ----  INTAKE & OUTPUT:  I&O's Summary    2024 07:01  -  01 May 2024 07:00  --------------------------------------------------------  IN: 2173.2 mL / OUT: 2300 mL / NET: -126.8 mL    01 May 2024 07:01  -  02 May 2024 05:36  --------------------------------------------------------  IN: 1791.8 mL / OUT: 0 mL / NET: 1791.8 mL        LABS:                        13.5   9.57  )-----------( 216      ( 01 May 2024 05:29 )             42.0     05-01    138  |  104  |  16  ----------------------------<  223<H>  4.2   |  27  |  1.50<H>    Ca    9.2      01 May 2024 05:29  Phos  3.3     05-  Mg     1.8     05    TPro  7.0  /  Alb  3.1<L>  /  TBili  0.7  /  DBili  x   /  AST  24  /  ALT  24  /  AlkPhos  105        Urinalysis Basic - ( 01 May 2024 05:29 )    Color: x / Appearance: x / SG: x / pH: x  Gluc: 223 mg/dL / Ketone: x  / Bili: x / Urobili: x   Blood: x / Protein: x / Nitrite: x   Leuk Esterase: x / RBC: x / WBC x   Sq Epi: x / Non Sq Epi: x / Bacteria: x      CAPILLARY BLOOD GLUCOSE      POCT Blood Glucose.: 169 mg/dL (02 May 2024 05:12)  POCT Blood Glucose.: 179 mg/dL (01 May 2024 23:12)  POCT Blood Glucose.: 179 mg/dL (01 May 2024 17:16)  POCT Blood Glucose.: 204 mg/dL (01 May 2024 11:20)  POCT Blood Glucose.: 256 mg/dL (01 May 2024 07:39)        Culture - Blood (collected 24 @ 23:50)  Source: .Blood Blood-Peripheral  Preliminary Report (24 @ 05:01):    No growth at 24 hours    Culture - Blood (collected 24 @ 23:45)  Source: .Blood Blood-Peripheral  Preliminary Report (24 @ 05:01):    No growth at 24 hours        ----  DATA REVIEW:  Personally reviewed:  -Vital sign trends: Reviewed vital sign trends, intermittently tachycardic, blood pressures soft at times, on low-flow nasal cannula  -Laboratory results: No leukocytosis, stable renal indices and electrolytes  -Radiology results: Repeat head CT with no bleeding  -Microbio results: Blood cultures negative from   -Consultant recommendations: Reviewed the recommendations of cardio neuro pulmonary and critical care.            ----  ACTIVE MEDICATIONS:  acetaminophen   Oral Liquid .. 650 milliGRAM(s) Oral every 6 hours PRN  albuterol/ipratropium for Nebulization 3 milliLiter(s) Nebulizer every 6 hours  aMIOdarone    Tablet 400 milliGRAM(s) Oral every 8 hours  aMIOdarone    Tablet 400  Oral   apixaban 2.5 milliGRAM(s) Oral every 12 hours  atorvastatin 80 milliGRAM(s) Oral at bedtime  buDESOnide    Inhalation Suspension 0.5 milliGRAM(s) Inhalation two times a day  ciprofloxacin   IVPB 400 milliGRAM(s) IV Intermittent every 12 hours  dextrose 10% Bolus 125 milliLiter(s) IV Bolus once  dextrose 5%. 1000 milliLiter(s) IV Continuous <Continuous>  dextrose 5%. 1000 milliLiter(s) IV Continuous <Continuous>  dextrose 50% Injectable 25 Gram(s) IV Push once  dextrose 50% Injectable 12.5 Gram(s) IV Push once  insulin lispro (ADMELOG) corrective regimen sliding scale   SubCutaneous every 6 hours  pantoprazole    Tablet 40 milliGRAM(s) Oral before breakfast  phenylephrine    Infusion 0.2 MICROgram(s)/kG/Min IV Continuous <Continuous>

## 2024-05-01 NOTE — PROGRESS NOTE ADULT - ASSESSMENT
83-year-old M with history of COPD on home O2 PRN, coronary artery disease s/p CABG,  hypertension, diabetes, hyperlipidemia, atrial fibrillation on Eliquis as well as chronic osteomyelitis who presents to the emergency department at [facility] complaining of rapid heart rate. Cardiology consulted for afib with rvr.    - Presenting with PAF with RVR  - At home he had noted some SOB, his wife checked his HR and noted it up was up to 160s so brought him to the ER, then placed on Cardizem gtt, had missed home BB   - on the morning of 4/27, patient was noted to have new onset aphasia and a code stroke was called.  He was deemed not a candidate for TNK as he is on AC.  He was found to have a L M3 occlusion but was deemed not a candidate for thrombectomy as occlusion too distal.  Later that night, a RRT was called due to worsening NIH score noted by nursing.  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   - Continue phenylephrine to maintain elevated MAP to optimize cerebral perfusion, wean as yara with neuro guidance  - back in NSR.  remains on amio gtt.  Can start enteric route, 5g total load and then 200 daily.    - Was on full dose ac with lovenox, though held because of risk of hemorrhagic conversion. Patient reports that he has missed doses of Eliquis at home.  Would not deem this eliquis failure  - BB on hold for permissive hypertension, though can restart if neuro ok with it.  - Continue aspirin    - resume statin when able  - Continue telemetry  - Monitor and replete electrolytes. Keep K>4.0 and Mg>2.0.  - remains on yolande, with high risk of decompensation.    Upon my evaluation, this patient is at high risk for imminent or life threatening deterioration due to cva, hypotension,  and other active medical issues which require my direct attention, intervention, and personal management.  I have personally spent >35 minutes  of critical care time exclusive of time spent on separate billing procedures. This includes review of laboratory data, radiology results, discussion with primary team\patient, and monitoring for potential decompensation Interventions were performed as documented above.

## 2024-05-01 NOTE — PROGRESS NOTE ADULT - ATTENDING COMMENTS
82 yo man with Hx eosinophilic asthma/COPD, chronic hypoxemic and hypercapnic respiratory failure, afib on eliquis, CAD, DM2, L femur Fx with chronic osto, admitted with uncontrolled afib, course complicated by acute L MCA CVA resulting in R hemiparesis and dysarthria.  Not a TNK candidate as he was on full AC.    --posterior L MCA CVA with multiple smaller acute b/l cerebral and cerebellar strokes  repeat CTH today without signs of bleeding, restart Eliquis AC  continue statin   continue induced htn with phenylephrine -180  PT/OT/speech/swallow evals  --afib controlled on amio, change to PO  --asthma, continue inhaled steroids, bronchodilators  due for nucala injection on 5/3, wife will bring in  chronic hypercapnic respiratory failure, continue BiPAP at night or with sleep  --CKD stable  --dysphagia, continue TF via NGT, MBS tomorrow  --chronic LE osteo, continue cipro  episode of fever, normal WBC, monitor off Abx for now and f/u Cx  --DM2 controlled on ISS  --plan discussed with wife

## 2024-05-01 NOTE — PROGRESS NOTE ADULT - ASSESSMENT
83 year old male with a PMH of eosinophilic asthma/COPD, former smoker, chronic hypoxemic and hypercapnic respiratory failure on nocturnal BiPAP, afib on Eliquis, CAD s/p CABG and PCI, HTN, HLD, DM2, L femur fracture with chronic OM who presented to the ED from home with complaints of SOB and tachycardia.      Neuro:   - s/p L MCA ischemic stroke w/ b/l embolic infarcts with no hemorrhagic conversion  - c/w phenylephrine to maintain -180  - Start PO ASA and resume home statin through NGT  - c/w PT/OT, failed bedside swallow    Cardio:  - Transition from amio gtt to PO amio  - Holding eliquis for afib given risk for hemorrhagic conversion  - c/w phenylephrine to maintain SBP >160  - BB on hold for permissive HTN    Pulm:   - Hx COPD, COURTNEY, c/w nocturnal BiPAP   - Standing duonebs, budesonide    GI:  - NGT placed, start tube feeds and PO medications  - Protonix added for stress ulcer ppx    Renal:   - No acute needs  - Replete lytes PRN    ID:   - New fever, source L arm IV site?  - s/p vanc x1 w/ concern for MRSA  - f/u BCx x2, UCx   - c/w home cipro for suppression of chronic osteomyelitis  - Local wound care for chronic R big toe wound     Endocrine:   - LDISS    Heme:   - Concern for LUE infection vs DVT, f/u LUE duplex  - c/w HSQ q8h per neuro recs 2/2 hemorrhagic conversion risk with full AC    #GOC: Full Code     83 year old male with a PMH of eosinophilic asthma/COPD, former smoker, chronic hypoxemic and hypercapnic respiratory failure on nocturnal BiPAP, afib on Eliquis, CAD s/p CABG and PCI, HTN, HLD, DM2, L femur fracture with chronic OM who presented to the ED from home with complaints of SOB and tachycardia.      Neuro:   - s/p L MCA ischemic stroke w/ b/l embolic infarcts with no hemorrhagic conversion  - c/w phenylephrine for now pending neuro recs, maintain -180  - Start PO ASA and resume home statin through NGT  - c/w PT/OT, failed bedside swallow    Cardio:  - Transition from amio gtt to PO amio  - Holding eliquis for afib given risk for hemorrhagic conversion  - c/w phenylephrine to maintain SBP >160  - BB on hold for permissive HTN    Pulm:   - Hx COPD, COURTNEY, c/w nocturnal BiPAP   - Standing duonebs, budesonide    GI:  - NGT placed, start tube feeds and PO medications  - Protonix added for stress ulcer ppx    Renal:   - No acute needs  - Replete lytes PRN    ID:   - New fever, source L arm IV site?  - s/p vanc x1 w/ concern for MRSA  - f/u BCx x2, UCx   - c/w home cipro for suppression of chronic osteomyelitis  - Local wound care for chronic R big toe wound     Endocrine:   - LDISS    Heme:   - Concern for LUE infection vs DVT, f/u LUE duplex  - c/w HSQ q8h per neuro recs 2/2 hemorrhagic conversion risk with full AC    #GOC: Full Code     83 year old male with a PMH of eosinophilic asthma/COPD, former smoker, chronic hypoxemic and hypercapnic respiratory failure on nocturnal BiPAP, afib on Eliquis, CAD s/p CABG and PCI, HTN, HLD, DM2, L femur fracture with chronic OM who presented to the ED from home with complaints of SOB and tachycardia.      Neuro:   - s/p L MCA ischemic stroke w/ b/l embolic infarcts with no hemorrhagic conversion  - c/w phenylephrine for now (goal -180) pending results of CTH non-con  - c/w ASA suppository for now and resume home statin through NGT  - c/w PT/OT    Cardio:  - Transition from amio gtt to PO amio  - Holding eliquis for afib given risk for hemorrhagic conversion  - Can resume Eliquis tonight pending results of CTH non-con  - c/w phenylephrine for now (goal -180) pending results of CT non-con  - BB on hold for now to allow for permissive HTN    Pulm:   - Hx COPD, COURTNEY, c/w nocturnal BiPAP   - Standing duonebs, budesonide  - Will receive home nucala injection tomorrow  GI:  - NGT placed, start tube feeds and PO medications  - PO protonix added for stress ulcer ppx    Renal:   - No acute needs  - Replete lytes PRN    ID:   - New fever, source L arm IV site?  - s/p vanc x1 w/ concern for MRSA  - f/u BCx x2, UCx   - c/w home cipro for suppression of chronic osteomyelitis  - Local wound care for chronic R big toe wound     Endocrine:   - LDISS    Heme:   - Concern for LUE infection vs DVT, f/u LUE duplex  - c/w HSQ q8h per neuro recs 2/2 hemorrhagic conversion risk with full AC  - Can resume eliquis pending results of non-con CT    #GOC: Full Code

## 2024-05-01 NOTE — PROGRESS NOTE ADULT - PROBLEM SELECTOR PLAN 9
Chronic, baseline 2L home O2 and BiPAP qHS   - Hold home montelukast as NPO  - Continue Bipap overnight   - Pulm Dr. Combs consulted  - Plan per ICU

## 2024-05-01 NOTE — PROGRESS NOTE ADULT - ASSESSMENT
83-year-old  black male with history of COPD coronary artery disease hypertension diabetes hyperlipidemia atrial fibrillation on Eliquis as well as osteomyelitis who presents now to the emergency department at [facility] complaining of rapid heart rate    jacy  copd  AF  OP  OA  CAD  HTN  DM  HLD  OM hx  CVA     TF  VS noted  on Cipro  NIV night time for JACY  plan for Acute rehab  on AMIO for AF  CVA tx in progress    follows with Dr Ryland ashley [facility]  uses NIV - BIPAP night time for JACY - sleep hygiene reviewed  cardio eval - [facility]vs rx regimen  BP control  DM care  AF rate and rhythm control  TFT  outpatient record reviewed  proventil PRN - Singulair - copd  spoke with WIFE  on emp ABX   wound care

## 2024-05-01 NOTE — PROGRESS NOTE ADULT - SUBJECTIVE AND OBJECTIVE BOX
Interval Events: Pt seen and examined at bedside. Yesterday, pt had NGT placed for nutrition. Overnight, pt febrile, tmax 101.1. Pt with erythema and pain at IV site. IV subseqeuntly flushed and given vancomycin x1. UA, UCx, BCx, CXR ordered. Pt has no acute complaints at present, denies fevers/chills, arm pain. Awake, appears upset and less talkative today.     Review of Systems:  Constitutional: No fever, chills  Neuro: + weakness. No headache  Resp: No SOB, cough   CVS: No chest pain  GI: No abdominal pain, nausea    ICU Vital Signs Last 24 Hrs  T(C): 36.8 (01 May 2024 08:12), Max: 38.4 (30 Apr 2024 23:00)  T(F): 98.2 (01 May 2024 08:12), Max: 101.1 (30 Apr 2024 23:00)  HR: 76 (01 May 2024 09:00) (69 - 104)  BP: 180/80 (01 May 2024 09:00) (136/65 - 194/81)  BP(mean): 115 (01 May 2024 09:00) (93 - 139)  ABP: --  ABP(mean): --  RR: 25 (01 May 2024 09:00) (17 - 29)  SpO2: 95% (01 May 2024 09:00) (95% - 100%)    O2 Parameters below as of 01 May 2024 07:30  Patient On (Oxygen Delivery Method): nasal cannula          04-30-24 @ 07:01  -  05-01-24 @ 07:00  --------------------------------------------------------  IN: 2173.2 mL / OUT: 2300 mL / NET: -126.8 mL    05-01-24 @ 07:01  -  05-01-24 @ 10:06  --------------------------------------------------------  IN: 116.8 mL / OUT: 0 mL / NET: 116.8 mL        CAPILLARY BLOOD GLUCOSE      POCT Blood Glucose.: 256 mg/dL (01 May 2024 07:39)      I&O's Summary    30 Apr 2024 07:01  -  01 May 2024 07:00  --------------------------------------------------------  IN: 2173.2 mL / OUT: 2300 mL / NET: -126.8 mL    01 May 2024 07:01  -  01 May 2024 10:06  --------------------------------------------------------  IN: 116.8 mL / OUT: 0 mL / NET: 116.8 mL        Physical Exam:   Gen: Awake, somnolent. Laying in bed comfortably  Neuro: +R-sided hemiparesis in upper > lower extremities. +wiggles R toes, improved from yesterday. +aphasia and dysarthria. PERRLA.   HEENT: +NGT. NC/AT, +L cataract  Resp: CTA B/L. No accessory muscle use. No wheezing.   CVS: RRR. +S1/S2. No murmurs  Abd: Soft. NT/ND. +BS  Ext: +mild LUE erythema around IV site. No edema in b/l lower extremity. +Chronic R big toe wound w/ associated desquamation of skin, no erythema or drainage    Meds:  ciprofloxacin   IVPB IV Intermittent    aMIOdarone Infusion IV Continuous  phenylephrine    Infusion IV Continuous    atorvastatin Oral  dextrose 50% Injectable IV Push  dextrose 50% Injectable IV Push  insulin lispro (ADMELOG) corrective regimen sliding scale SubCutaneous    albuterol/ipratropium for Nebulization Nebulizer  buDESOnide    Inhalation Suspension Inhalation    acetaminophen   Oral Liquid .. Oral PRN  aspirin Suppository Rectal      heparin   Injectable SubCutaneous    pantoprazole  Injectable IV Push      dextrose 10% Bolus IV Bolus  dextrose 5%. IV Continuous  dextrose 5%. IV Continuous                                  13.5   9.57  )-----------( 216      ( 01 May 2024 05:29 )             42.0       05-01    138  |  104  |  16  ----------------------------<  223<H>  4.2   |  27  |  1.50<H>    Ca    9.2      01 May 2024 05:29  Phos  3.3     05-01  Mg     1.8     05-01    TPro  7.0  /  Alb  3.1<L>  /  TBili  0.7  /  DBili  x   /  AST  24  /  ALT  24  /  AlkPhos  105  04-30            Urinalysis Basic - ( 01 May 2024 05:29 )    Color: x / Appearance: x / SG: x / pH: x  Gluc: 223 mg/dL / Ketone: x  / Bili: x / Urobili: x   Blood: x / Protein: x / Nitrite: x   Leuk Esterase: x / RBC: x / WBC x   Sq Epi: x / Non Sq Epi: x / Bacteria: x    Radiology: STEVIE dupolex ordered    Tubes/Lines: LUE peripheral IV      GLOBAL ISSUE/BEST PRACTICE:  Analgesia: N  Sedation: N  HOB elevation: Y  Stress ulcer prophylaxis: Y  VTE prophylaxis: Y  Glycemic control: Y  Nutrition: Y    CODE STATUS: Full Code

## 2024-05-01 NOTE — PROGRESS NOTE ADULT - ASSESSMENT
83M with pmhx afib on eliquis, CAD, htn/hld, copd, chronic OM on suppressive cipro, chronic hyopxemic/hypercapnic respiratory failure on noct bipap initially p/w SOB, now admitted with:     acute CVA   afib rvr  cellulitis LUE  dysphagia    Neuro: rectal asa, statin now to be added that pt has parenteral access. asa could likley be switched to po as well. will obtain repeat s/s eval   CV: started on phenylephrine, actively titrating for -180. amio gtt now completed, will add po now with NGT, cardio following will discuss dosing   Pulm: bipap qhs,m nebs  GI: NGT placed tonight. pending interval s/s eval in am. ordered for tube feeds in event that he fails s/s. added protonix stress ulcer ppx  Renal: supp lytes for goal k>4 mg >3 phos >3  ID: new fevers this evening, suspect related to L arm cellulitis from an IV. IV removed. bcx x2 sent 4/30, check ua/ucx. ordered for vanc 1500mg ivpb x1 given now given almost week long hospital stay; at risk fro mrsa infection. cont cipro. further abx to be continued per ID given concurrent suppressive abx for om   Heme: asa/hep sq. holding eliquis at risk for hemorrhagic conversion   Endo: goal bg 110-180. ISS    Dispo: remain in ICU. needs liberalization of BP goals in discussions with neuro, enteral feeds and abx              83M with pmhx afib on eliquis, CAD, htn/hld, copd, chronic OM on suppressive cipro, chronic hyopxemic/hypercapnic respiratory failure on noct bipap initially p/w SOB, now admitted with:     acute CVA   afib rvr  cellulitis LUE  dysphagia    Neuro: rectal asa, statin now to be added that pt has parenteral access. asa could likley be switched to po as well. will obtain repeat s/s eval   CV: started on phenylephrine, actively titrating for -180. amio gtt now completed, will add po now with NGT, cardio following will discuss dosing   Pulm: bipap qhs,m nebs  GI: NGT placed tonight. pending interval s/s eval in am. ordered for tube feeds in event that he fails s/s. added protonix stress ulcer ppx  Renal: supp lytes for goal k>4 mg >3 phos >3  ID: new fevers this evening, suspect related to L arm cellulitis from an IV. IV removed. bcx x2 sent 4/30, check ua/ucx. ordered for vanc 1500mg ivpb x1 given now given almost week long hospital stay; at risk fro mrsa infection. cont cipro. further abx to be continued per ID given concurrent suppressive abx for om   Heme: asa/hep sq. holding eliquis at risk for hemorrhagic conversion. ordered lue duplex r/o dvt given erythema and swelling although more likely infectious  Endo: goal bg 110-180. ISS    Dispo: remain in ICU. needs liberalization of BP goals in discussions with neuro, enteral feeds and abx

## 2024-05-01 NOTE — PROGRESS NOTE ADULT - PROBLEM SELECTOR PLAN 7
Chronic  - Continue Phenyephrine gtt to maintain -170 for permissive HTN   - Discontinue lopressor for permissive HTN  - Monitor routine hemodynamics  - Plan per ICU

## 2024-05-01 NOTE — PROGRESS NOTE ADULT - SUBJECTIVE AND OBJECTIVE BOX
Neurology Follow up note    YUE [id number]    HPI:  83yoM PMHx AFib on Eliquis, CAD, HTN, DM, HLD, COPD, osteomyelitis presents c/o shortness of breath, chest discomfort. Pt reports onset of rapid heart rate this morning, HR measured 160s when he checked his pulse ox. Also endorses dyspnea exacerbated by movement. Pt reports last episode of AFib in 2020, no episodes since then until today's presentation. Pt states that chest discomfort feels like chest pressure, no chest pain. Pt also reports chronic R big toe wound for the past few months, follows w/ Wound Care. States that he has increased sensitivity to R sole of foot, but denies pain, numbness/tingling. Denies fevers, chills, abdominal pain, N/V/D/C.     IN THE ED:  Temp 98  F ,  , /81  ,RR 18 , SpO2 94%  S/P PO , IV diltiazem 10mg x2, IV diltiazem gtt @ 10 mg/hr  Labs significant for BUN/Cr 25/1.6, troponin 507.9, pBNP 355  Imaging:   CXR wet read: no gross abnormalities (25 Apr 2024 12:13)      Interval History -failed bed side dysphagia test    Patient is seen, chart was reviewed and case was discussed with the treatment team.  Pt is not in any distress.   Lying on bed comfortably.     .    Vital Signs Last 24 Hrs  T(C): 37.7 (01 May 2024 11:54), Max: 38.4 (30 Apr 2024 23:00)  T(F): 99.9 (01 May 2024 11:54), Max: 101.1 (30 Apr 2024 23:00)  HR: 79 (01 May 2024 12:30) (69 - 104)  BP: 173/76 (01 May 2024 12:30) (136/65 - 194/81)  BP(mean): 109 (01 May 2024 12:30) (93 - 139)  RR: 19 (01 May 2024 12:30) (16 - 29)  SpO2: 95% (01 May 2024 12:30) (95% - 100%)    Parameters below as of 01 May 2024 07:30  Patient On (Oxygen Delivery Method): nasal cannula                    REVIEW OF SYSTEMS:    Constitutional: No fever, weight loss or fatigue  Eyes: No eye pain, visual disturbances, or discharge  ENT:  No difficulty hearing, tinnitus, vertigo; No sinus or throat pain  Neck: No pain or stiffness  Respiratory: No wheezing, chills or hemoptysis  Cardiovascular: No chest pain, palpitations, shortness of breath, dizziness  Gastrointestinal: No abdominal or epigastric pain. No nausea, vomiting or hematemesis  Genitourinary: No dysuria, frequency, hematuria or incontinence  Neurological: No headaches, numbness or tremors  Psychiatric: No depression, anxiety, mood swings or difficulty sleeping  Musculoskeletal: No joint pain or swelling; No muscle, back or extremity pain  Skin: No itching, burning, rashes or lesions   Lymph Nodes: No enlarged glands  Endocrine: No heat or cold intolerance; No hair loss,   Allergy and Immunologic: No hives or eczema    On Neurological Examination:    Mental Status - Pt is alert, awake, oriented X3.. Follows commands well and able to answer questions appropriately.Mood and affect  normal    Speech -  MIXED APHASIA    Cranial Nerves - Pupils 3 mm equal and reactive to light, extraocular eye movements intact. Pt has no visual field deficit.  Pt has right facial weakness  . Facial sensation is intact.Tongue - is in midline.    Muscle tone - is decreased on right  .      Motor Exam -right hemiparesis; RUE 1/5; LE 1-2/5   No shaking or tremors.    Sensory Exam -. Pt withdraws all extremities equally on stimulation. No asymmetry seen. No complaints of tingling, numbness.        coordination:    Finger to nose: normal-left        Deep tendon Reflexes - 2 plus all over.            Neck Supple -  Yes.     MEDICATIONS  (STANDING):  albuterol/ipratropium for Nebulization 3 milliLiter(s) Nebulizer every 6 hours  aMIOdarone    Tablet 400  Oral   aspirin Suppository 300 milliGRAM(s) Rectal daily  atorvastatin 80 milliGRAM(s) Oral at bedtime  buDESOnide    Inhalation Suspension 0.5 milliGRAM(s) Inhalation two times a day  ciprofloxacin   IVPB 400 milliGRAM(s) IV Intermittent every 12 hours  dextrose 10% Bolus 125 milliLiter(s) IV Bolus once  dextrose 5%. 1000 milliLiter(s) (100 mL/Hr) IV Continuous <Continuous>  dextrose 5%. 1000 milliLiter(s) (50 mL/Hr) IV Continuous <Continuous>  dextrose 50% Injectable 12.5 Gram(s) IV Push once  dextrose 50% Injectable 25 Gram(s) IV Push once  heparin   Injectable 5000 Unit(s) SubCutaneous every 8 hours  insulin lispro (ADMELOG) corrective regimen sliding scale   SubCutaneous every 6 hours  pantoprazole    Tablet 40 milliGRAM(s) Oral before breakfast  phenylephrine    Infusion 0.2 MICROgram(s)/kG/Min (7.58 mL/Hr) IV Continuous <Continuous>    MEDICATIONS  (PRN):  acetaminophen   Oral Liquid .. 650 milliGRAM(s) Oral every 6 hours PRN Temp greater or equal to 38.5C (101.3F)      MEDICATIONS  (PRN):      Allergies    No Known Allergies    Intolerances                          13.5   9.57  )-----------( 216      ( 01 May 2024 05:29 )             42.0   05-01    138  |  104  |  16  ----------------------------<  223<H>  4.2   |  27  |  1.50<H>    Ca    9.2      01 May 2024 05:29  Phos  3.3     05-01  Mg     1.8     05-01    TPro  7.0  /  Alb  3.1<L>  /  TBili  0.7  /  DBili  x   /  AST  24  /  ALT  24  /  AlkPhos  105  04-30    Hemoglobin A1C:     Vitamin B12     RADIOLOGY    ASSESSMENT AND PLAN:      Seen for right sided weakness/ams  consistent subacute left mca infarct  also multiple punctate subacute cerebellar infarcts  most likely cardio-embolic    repeat ct head- today  start apixaban if ct head- negative for bleed  For MBS tomorrow  dvt prophylaxis  permissive -180  management dw critical care team  Physical therapy evaluation.  Speech/swallowing eval in progress  Pain is accessed and addressed.  Plan of care was discussed with family(wife_. Questions answered.  Would continue to follow.

## 2024-05-01 NOTE — PROGRESS NOTE ADULT - PROBLEM SELECTOR PLAN 10
#VTE ppx: Eliquis  #GI ppx: PPI  #Diet: Diet, NPO with Tube Feed: Tube Feeding Modality: Nasogastric Glucerna 1.5  #Activity: Activity - Increase As Tolerated:   Time/Priority:  Routine (04-27-24 @ 22:51) [Active]  #ACP: Full code  #Dispo: further plans pending clinical course

## 2024-05-01 NOTE — PROGRESS NOTE ADULT - SUBJECTIVE AND OBJECTIVE BOX
CC: L mca infarct    BRIEF HOSPITAL COURSE: 83M with pmhx afib on eliquis, CAD, htn/hld, copd, chronic OM on suppressive cipro intitally p/w SOB, admitted with afib with rvr and started on diltiazem gtt. Hospital course c/b code kathy, found to have acute MCA infarct; not a candiate for TNK as he has been on eliquis. Mental status worsened and he was upgraded to ICU on 4/27 and satrted on vasoactive medications for permissive hypertension.    Events last 24 hours: This evening, developed fever Tmax 101.1. Noted with large area of erythema at L forearm IV site. IV flushed, but was painful. Amio had been running, no known infiltration. He endorses pain at the site, appears uncomfortable. ALso with dysuria. NGT placed earlier this evening, no infiltrate appreciated on cxr.     ROS:   CONSTITUTIONAL: (-) fever, (-) chills  RESPIRATORY: (-) SOB, (-) cough  CV: (-) chest pain, (-) palpitations  GI: (-) abdominal pain, (-) nausea, (-) vomiting, (-) diarrhea, (-) constipation   NEURO: (-) headache, (-) weakness, (-) visual changes      PAST MEDICAL & SURGICAL HISTORY:  Diabetes Mellitus, Type II      CAD (Coronary Artery Disease)  s/p 3v CABG 2004; stents placed in [place] in 2019      Dyslipidemia      Osteomyelitis      COPD (chronic obstructive pulmonary disease)  on 2L at home and BiPAP at night; intubated 6/18      Hypertension      PVD (peripheral vascular disease)      History of PAT (paroxysmal atrial tachycardia)      Asthma with COPD      BPH (benign prostatic hyperplasia)      Acute osteomyelitis      CABG (Coronary Artery Bypass Graft)  2004      Compound fracture  left leg      S/P primary angioplasty with coronary stent      H/O drainage of abscess  Left femur 12/2021      Medications:  ciprofloxacin   IVPB 400 milliGRAM(s) IV Intermittent every 12 hours  vancomycin  IVPB 1500 milliGRAM(s) IV Intermittent every 12 hours    aMIOdarone Infusion 0.5 mG/Min IV Continuous <Continuous>  phenylephrine    Infusion 0.2 MICROgram(s)/kG/Min IV Continuous <Continuous>    albuterol/ipratropium for Nebulization 3 milliLiter(s) Nebulizer every 6 hours  buDESOnide    Inhalation Suspension 0.5 milliGRAM(s) Inhalation two times a day    acetaminophen   Oral Liquid .. 650 milliGRAM(s) Oral every 6 hours PRN  aspirin Suppository 300 milliGRAM(s) Rectal daily      heparin   Injectable 5000 Unit(s) SubCutaneous every 8 hours        atorvastatin 80 milliGRAM(s) Oral at bedtime  dextrose 50% Injectable 12.5 Gram(s) IV Push once  dextrose 50% Injectable 25 Gram(s) IV Push once  insulin lispro (ADMELOG) corrective regimen sliding scale   SubCutaneous every 6 hours    dextrose 10% Bolus 125 milliLiter(s) IV Bolus once  dextrose 5%. 1000 milliLiter(s) IV Continuous <Continuous>  dextrose 5%. 1000 milliLiter(s) IV Continuous <Continuous>        ICU Vital Signs Last 24 Hrs  T(C): 38.3 (01 May 2024 01:30), Max: 38.4 (30 Apr 2024 23:00)  T(F): 100.9 (01 May 2024 01:30), Max: 101.1 (30 Apr 2024 23:00)  HR: 90 (01 May 2024 02:00) (64 - 104)  BP: 136/65 (01 May 2024 02:00) (136/65 - 211/84)  BP(mean): 93 (01 May 2024 02:00) (93 - 139)  ABP: --  ABP(mean): --  RR: 23 (01 May 2024 02:00) (15 - 29)  SpO2: 98% (01 May 2024 02:00) (95% - 100%)    O2 Parameters below as of 01 May 2024 01:11  Patient On (Oxygen Delivery Method): BiPAP/CPAP            Physical Examination:    General: Laying in bed in no acute distress, sleech slurred mildly  PULM: cta b/l, unlabored respirations on bipap  CVS: rrr +s1s2  ABD: Softly distended, nontender  EXT: No edema, nontender  SKIN: Warm and well perfused  NEURO: paresis of RUE and RLE, can wigle toes LLE, 4/5  strength rue         I&O's Detail    29 Apr 2024 07:01  -  30 Apr 2024 07:00  --------------------------------------------------------  IN:    Amiodarone: 267.2 mL    IV PiggyBack: 50 mL    IV PiggyBack: 200 mL    Lactated Ringers: 400 mL    Phenylephrine: 964.3 mL  Total IN: 1881.5 mL    OUT:    Incontinent per Condom Catheter (mL): 2800 mL  Total OUT: 2800 mL    Total NET: -918.5 mL      30 Apr 2024 07:01  -  01 May 2024 02:24  --------------------------------------------------------  IN:    Amiodarone: 233.8 mL    IV PiggyBack: 100 mL    Phenylephrine: 779.5 mL  Total IN: 1113.3 mL    OUT:    Amiodarone: 0 mL    Incontinent per Condom Catheter (mL): 1500 mL    Intermittent Catheterization - Urethral (mL): 300 mL  Total OUT: 1800 mL    Total NET: -686.7 mL          LABS:                        14.5   8.39  )-----------( 212      ( 30 Apr 2024 05:45 )             45.4     04-30    139  |  103  |  12  ----------------------------<  196<H>  4.4   |  29  |  1.40<H>    Ca    9.8      30 Apr 2024 05:45  Phos  3.0     04-30  Mg     2.0     04-30    TPro  7.0  /  Alb  3.1<L>  /  TBili  0.7  /  DBili  x   /  AST  24  /  ALT  24  /  AlkPhos  105  04-30          CAPILLARY BLOOD GLUCOSE      POCT Blood Glucose.: 172 mg/dL (30 Apr 2024 23:13)    PTT - ( 29 Apr 2024 06:08 )  PTT:34.0 sec  Urinalysis Basic - ( 30 Apr 2024 05:45 )    Color: x / Appearance: x / SG: x / pH: x  Gluc: 196 mg/dL / Ketone: x  / Bili: x / Urobili: x   Blood: x / Protein: x / Nitrite: x   Leuk Esterase: x / RBC: x / WBC x   Sq Epi: x / Non Sq Epi: x / Bacteria: x      CULTURES: bcx sent      RADIOLOGY:     < from: MR Head No Cont (04.27.24 @ 12:14) >    IMPRESSION:    1.  Multiple acute infarcts scattered throughout the bilateral cerebral   and cerebellar hemispheres. Given multiple vascular distributions   involved, an embolic source should be considered.  2.  More dominant acute to subacute infarction in the posterior left MCA   vascular distribution.  3.  Chronic small vessel disease.    --- End of Report ---    < end of copied text >      INVASIVE LINES: N  INDWELLING SHARMA: N  VTE PROPHYLAXIS: hep sq  CODE STATUS: full    CRITICAL CARE TIME SPENT: 40 minutes spent performing frequent bedside reassessments and augmenting plan of care to address problems of acute critical illness that pose high probability of life threatening deterioration and/or end organ damage/dysfunction and discussing goals of care, non-inclusive of time spent on procedures performed. Date of entry of this note is equal to the date of services rendered.

## 2024-05-01 NOTE — PROGRESS NOTE ADULT - PROBLEM SELECTOR PLAN 5
MERRILL on admission. Baseline Cr around 1.1  - On admission Cr 1.6   - Feed via NGT  - Avoid nephrotoxics - hold home furosemide for now  - Renal indices have been stable

## 2024-05-01 NOTE — PROGRESS NOTE ADULT - TIME BILLING
Emotional support provided to the patient and his spouse at the bedside.  The patient asked expressing some objective evidence of frustration regarding his inability to communicate effectively.  Counseling and education.

## 2024-05-01 NOTE — PROGRESS NOTE ADULT - PROBLEM SELECTOR PLAN 1
- MRI Head: Multiple acute infarcts scattered throughout the bilateral cerebral and cerebellar hemispheres. More dominant acute to subacute infarction in the posterior left MCA vascular distribution. Chronic small vessel disease.  - No evidence of hemorrhagic conversion -repeat head CT from May 1 with no acute findings  -Resume Eliquis  - Maintains on a phenylephrine infusion to elevate MAP  - Continue aspirin suppository  - Continue phenylephrine gtt to maintain -170 for permissive HTN   - Neuro checks q4h  - The patient remains n.p.o.  - NG tube placed for feeds  - Plan for possible MBS once otherwise clinically appropriate  - Fall and aspiration precautions   - Plan per ICU

## 2024-05-01 NOTE — PROGRESS NOTE ADULT - PROBLEM SELECTOR PLAN 4
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  - R big toe dressing c/d/i  - Continue Cipro    - Continue local wound care   - ID following   - Podiatry following  - Plan per ICU

## 2024-05-01 NOTE — PROGRESS NOTE ADULT - SUBJECTIVE AND OBJECTIVE BOX
Date/Time Patient Seen:  		  Referring MD:   Data Reviewed	       Patient is a 83y old  Male who presents with a chief complaint of AFib w/ RVR (01 May 2024 02:06)      Subjective/HPI     PAST MEDICAL & SURGICAL HISTORY:  Diabetes Mellitus, Type II    CAD (Coronary Artery Disease)  s/p 3v CABG 2004; stents placed in [place] in 2019    Dyslipidemia    Asymptomatic Peripheral Vascular Disease    Osteomyelitis    COPD (chronic obstructive pulmonary disease)  on 2L at home and BiPAP at night; intubated 6/18    Diabetes mellitus    Hypertension    PVD (peripheral vascular disease)    History of PAT (paroxysmal atrial tachycardia)    Asthma with COPD    BPH (benign prostatic hyperplasia)    Acute osteomyelitis    CABG (Coronary Artery Bypass Graft)  2004    Compound fracture  left leg    S/P primary angioplasty with coronary stent    H/O drainage of abscess  Left femur 12/2021          Medication list         MEDICATIONS  (STANDING):  albuterol/ipratropium for Nebulization 3 milliLiter(s) Nebulizer every 6 hours  aMIOdarone Infusion 0.5 mG/Min (16.7 mL/Hr) IV Continuous <Continuous>  aspirin Suppository 300 milliGRAM(s) Rectal daily  atorvastatin 80 milliGRAM(s) Oral at bedtime  buDESOnide    Inhalation Suspension 0.5 milliGRAM(s) Inhalation two times a day  ciprofloxacin   IVPB 400 milliGRAM(s) IV Intermittent every 12 hours  dextrose 10% Bolus 125 milliLiter(s) IV Bolus once  dextrose 5%. 1000 milliLiter(s) (100 mL/Hr) IV Continuous <Continuous>  dextrose 5%. 1000 milliLiter(s) (50 mL/Hr) IV Continuous <Continuous>  dextrose 50% Injectable 12.5 Gram(s) IV Push once  dextrose 50% Injectable 25 Gram(s) IV Push once  heparin   Injectable 5000 Unit(s) SubCutaneous every 8 hours  insulin lispro (ADMELOG) corrective regimen sliding scale   SubCutaneous every 6 hours  pantoprazole  Injectable 40 milliGRAM(s) IV Push daily  phenylephrine    Infusion 0.2 MICROgram(s)/kG/Min (7.58 mL/Hr) IV Continuous <Continuous>    MEDICATIONS  (PRN):  acetaminophen   Oral Liquid .. 650 milliGRAM(s) Oral every 6 hours PRN Temp greater or equal to 38.5C (101.3F)         Vitals log        ICU Vital Signs Last 24 Hrs  T(C): 37.8 (01 May 2024 04:05), Max: 38.4 (30 Apr 2024 23:00)  T(F): 100 (01 May 2024 04:05), Max: 101.1 (30 Apr 2024 23:00)  HR: 75 (01 May 2024 05:30) (66 - 104)  BP: 192/84 (01 May 2024 05:30) (136/65 - 193/86)  BP(mean): 121 (01 May 2024 05:30) (93 - 139)  ABP: --  ABP(mean): --  RR: 19 (01 May 2024 05:30) (16 - 29)  SpO2: 99% (01 May 2024 05:30) (95% - 100%)    O2 Parameters below as of 01 May 2024 01:11  Patient On (Oxygen Delivery Method): BiPAP/CPAP                 Input and Output:  I&O's Detail    29 Apr 2024 07:01  -  30 Apr 2024 07:00  --------------------------------------------------------  IN:    Amiodarone: 267.2 mL    IV PiggyBack: 50 mL    IV PiggyBack: 200 mL    Lactated Ringers: 400 mL    Phenylephrine: 964.3 mL  Total IN: 1881.5 mL    OUT:    Incontinent per Condom Catheter (mL): 2800 mL  Total OUT: 2800 mL    Total NET: -918.5 mL      30 Apr 2024 07:01  -  01 May 2024 05:45  --------------------------------------------------------  IN:    Amiodarone: 300.6 mL    IV PiggyBack: 300 mL    IV PiggyBack: 500 mL    Phenylephrine: 1006.7 mL  Total IN: 2107.3 mL    OUT:    Amiodarone: 0 mL    Incontinent per Condom Catheter (mL): 2000 mL    Intermittent Catheterization - Urethral (mL): 300 mL  Total OUT: 2300 mL    Total NET: -192.7 mL          Lab Data                        13.5   9.57  )-----------( 216      ( 01 May 2024 05:29 )             42.0     04-30    139  |  103  |  12  ----------------------------<  196<H>  4.4   |  29  |  1.40<H>    Ca    9.8      30 Apr 2024 05:45  Phos  3.0     04-30  Mg     2.0     04-30    TPro  7.0  /  Alb  3.1<L>  /  TBili  0.7  /  DBili  x   /  AST  24  /  ALT  24  /  AlkPhos  105  04-30            Review of Systems	      Objective     Physical Examination    heart s1s2  lung dc BS  head nc      Pertinent Lab findings & Imaging      Eliecer:  NO   Adequate UO     I&O's Detail    29 Apr 2024 07:01  -  30 Apr 2024 07:00  --------------------------------------------------------  IN:    Amiodarone: 267.2 mL    IV PiggyBack: 50 mL    IV PiggyBack: 200 mL    Lactated Ringers: 400 mL    Phenylephrine: 964.3 mL  Total IN: 1881.5 mL    OUT:    Incontinent per Condom Catheter (mL): 2800 mL  Total OUT: 2800 mL    Total NET: -918.5 mL      30 Apr 2024 07:01  -  01 May 2024 05:45  --------------------------------------------------------  IN:    Amiodarone: 300.6 mL    IV PiggyBack: 300 mL    IV PiggyBack: 500 mL    Phenylephrine: 1006.7 mL  Total IN: 2107.3 mL    OUT:    Amiodarone: 0 mL    Incontinent per Condom Catheter (mL): 2000 mL    Intermittent Catheterization - Urethral (mL): 300 mL  Total OUT: 2300 mL    Total NET: -192.7 mL               Discussed with:     Cultures:	        Radiology

## 2024-05-02 NOTE — PROGRESS NOTE ADULT - TIME BILLING
Emotional support provided to the patient and his spouse at the bedside.  The patient asked expressing some objective evidence of frustration regarding his inability to communicate effectively.  Counseling and education. Emotional support and counseling provided.

## 2024-05-02 NOTE — PROGRESS NOTE ADULT - ASSESSMENT
83 year old male with a PMH of eosinophilic asthma/COPD, former smoker, chronic hypoxemic and hypercapnic respiratory failure on nocturnal BiPAP, afib on Eliquis, CAD s/p CABG and PCI, HTN, HLD, DM2, L femur fracture with chronic OM who presented to the ED from home with complaints of SOB and tachycardia.      Neuro:   - s/p L MCA ischemic stroke w/ b/l embolic infarcts with no hemorrhagic conversion  - Repeat CTH w/ redemonstration of multipile evolving infarcts  - BP goal lowered to 140-160 SBP, c/w phenylephrine   - Start ASA 81mg PO, c/w statin  - c/w PT/OT, plan for MBS today    Cardio:  - C/w PO Amio  - Resume eliquis for afib   - BP goal lowered to 140-160 SBP, c/w phenylephrine   - BB on hold for now to allow for permissive HTN    Pulm:   - Hx COPD, COURTNEY, c/w nocturnal BiPAP   - Standing duonebs, budesonide  - Will receive home nucala injection tomorrow    GI:  - +NGT, c/w tube feeds  - Plan for MBS today  - c/w protonix for stress ulcer ppx    Renal:   - No acute needs  - Replete lytes PRN    ID:   - Febrile again overnight, source L arm IV site?  - BCx NGTD, UCx Negative  - Will monitor off IB abx for now  - c/w home cipro for suppression of chronic osteomyelitis  - Local wound care for chronic R big toe wound     Endocrine:   - LDISS    Heme:   - LUE duplex negative for DVT  - c/w HSQ q8h per neuro recs 2/2 hemorrhagic conversion risk with full AC  - C/w Eliquis 2.5mg BID based off age and CrCl    #GOC: Full Code

## 2024-05-02 NOTE — PROGRESS NOTE ADULT - SUBJECTIVE AND OBJECTIVE BOX
[facility] - Génesis Villavicencio, Carroll Haney Savella, [place]  Office Number:  [phone]    Feels ok this morning  But feels fatigued  Remains on low dose phenylephrine   BPs in 190s systolic this morning    ROS: negative unless otherwise mentioned.    Telemetry:  SR    MEDICATIONS  (STANDING):  albuterol/ipratropium for Nebulization 3 milliLiter(s) Nebulizer every 6 hours  aMIOdarone    Tablet 400  Oral   aMIOdarone    Tablet 400 milliGRAM(s) Oral every 8 hours  apixaban 2.5 milliGRAM(s) Oral every 12 hours  atorvastatin 80 milliGRAM(s) Oral at bedtime  buDESOnide    Inhalation Suspension 0.5 milliGRAM(s) Inhalation two times a day  ciprofloxacin   IVPB 400 milliGRAM(s) IV Intermittent every 12 hours  dextrose 10% Bolus 125 milliLiter(s) IV Bolus once  dextrose 5%. 1000 milliLiter(s) (100 mL/Hr) IV Continuous <Continuous>  dextrose 5%. 1000 milliLiter(s) (50 mL/Hr) IV Continuous <Continuous>  dextrose 50% Injectable 12.5 Gram(s) IV Push once  dextrose 50% Injectable 25 Gram(s) IV Push once  insulin lispro (ADMELOG) corrective regimen sliding scale   SubCutaneous every 6 hours  pantoprazole   Suspension 40 milliGRAM(s) Oral daily  phenylephrine    Infusion 0.2 MICROgram(s)/kG/Min (7.58 mL/Hr) IV Continuous <Continuous>    MEDICATIONS  (PRN):  acetaminophen   Oral Liquid .. 650 milliGRAM(s) Oral every 6 hours PRN Temp greater or equal to 38.5C (101.3F)      Allergies    No Known Allergies    Intolerances        Vital Signs Last 24 Hrs  T(C): 36.7 (02 May 2024 07:41), Max: 38.2 (02 May 2024 00:04)  T(F): 98 (02 May 2024 07:41), Max: 100.8 (02 May 2024 00:04)  HR: 80 (02 May 2024 10:00) (69 - 101)  BP: 175/81 (02 May 2024 10:00) (112/72 - 203/78)  BP(mean): 116 (02 May 2024 10:00) (82 - 139)  RR: 21 (02 May 2024 10:00) (16 - 31)  SpO2: 95% (02 May 2024 10:00) (92% - 100%)    Parameters below as of 02 May 2024 08:00  Patient On (Oxygen Delivery Method): room air        I&O's Summary    01 May 2024 07:01  -  02 May 2024 07:00  --------------------------------------------------------  IN: 2052.4 mL / OUT: 800 mL / NET: 1252.4 mL        ON EXAM:    Constitutional: well-nourished, well-developed, NAD   HEENT:  ngt, sclerae anicteric, conjunctivae clear, no oral cyanosis.  Pulmonary: Non-labored, breath sounds are clear bilaterally, No wheezing, rales or rhonchi  Cardiovascular: Regular, S1 and S2.  No murmur.  No rubs, gallops or clicks  Gastrointestinal: Bowel Sounds present, soft, nontender.   Lymph: No peripheral edema.   Neurological: unable to evaluate, poor mental status  Skin: No rashes.  Psych:  Mood & affect not evaluable    LABS: All Labs Reviewed:                        13.9   9.75  )-----------( 225      ( 02 May 2024 05:30 )             44.3                         13.5   9.57  )-----------( 216      ( 01 May 2024 05:29 )             42.0                         14.5   8.39  )-----------( 212      ( 30 Apr 2024 05:45 )             45.4     02 May 2024 05:30    138    |  106    |  17     ----------------------------<  196    4.7     |  27     |  1.50   01 May 2024 05:29    138    |  104    |  16     ----------------------------<  223    4.2     |  27     |  1.50   30 Apr 2024 05:45    139    |  103    |  12     ----------------------------<  196    4.4     |  29     |  1.40     Ca    9.0        02 May 2024 05:30  Ca    9.2        01 May 2024 05:29  Ca    9.8        30 Apr 2024 05:45  Phos  3.2       02 May 2024 05:30  Phos  3.3       01 May 2024 05:29  Phos  3.0       30 Apr 2024 05:45  Mg     2.0       02 May 2024 05:30  Mg     1.8       01 May 2024 05:29  Mg     2.0       30 Apr 2024 05:45    TPro  7.0    /  Alb  3.1    /  TBili  0.7    /  DBili  x      /  AST  24     /  ALT  24     /  AlkPhos  105    30 Apr 2024 05:45          Blood Culture: Organism --  Gram Stain Blood -- Gram Stain --  Specimen Source .Blood Blood-Peripheral  Culture-Blood --    Organism --  Gram Stain Blood -- Gram Stain --  Specimen Source .Blood Blood-Peripheral  Culture-Blood --

## 2024-05-02 NOTE — PROGRESS NOTE ADULT - ASSESSMENT
83-year-old  black male with history of COPD coronary artery disease hypertension diabetes hyperlipidemia atrial fibrillation on Eliquis as well as osteomyelitis who presents now to the emergency department at [facility] complaining of rapid heart rate    jacy  copd  AF  OP  OA  CAD  HTN  DM  HLD  OM hx  CVA     TF  VS noted  on Cipro  NIV night time for JACY  plan for Acute rehab  on AMIO for AF  CVA tx in progress - CT head repeat noted -     follows with Dr Ryland ashley med  uses NIV - BIPAP night time for JACY - sleep hygiene reviewed  cardio eval - cvs rx regimen  BP control  DM care  AF rate and rhythm control  TFT  outpatient record reviewed  proventil PRN - Singulair - copd  spoke with WIFE  on emp ABX   wound care

## 2024-05-02 NOTE — PROGRESS NOTE ADULT - PROBLEM SELECTOR PLAN 2
History of Afib, on home Metoprolol BID and Eliquis   - TTE 4/26/2024: LV no well visualized however LV probably normal based on limited views, moderate thickening of aortic valve leaflets, trace TR, dilated IVC with normal inspiratory collapse, normal pulmonary artery pressure  - NG tube placed for meds and feeds  - Consider resumption of therapeutic anticoagulation pending neurology's recommendations  - Hold home BB to allow for permissive HTN   - Transition to amnio via NG tube  - Telemetry monitoring, electrolyte monitoring  - Plan per ICU History of Afib, on home Metoprolol BID and Eliquis   - TTE 4/26/2024: LV no well visualized however LV probably normal based on limited views, moderate thickening of aortic valve leaflets, trace TR, dilated IVC with normal inspiratory collapse, normal pulmonary artery pressure  - NG tube placed for meds and feeds  - Consider resumption of therapeutic anticoagulation pending neurology's recommendations  - Hold home BB to allow for permissive HTN   - Transition to amio via NG tube  - Telemetry monitoring, electrolyte monitoring  - Plan per ICU

## 2024-05-02 NOTE — CASE MANAGEMENT PROGRESS NOTE - NSCMPROGRESSNOTE_GEN_ALL_CORE
Chart reviewed from a case management perspective.  Patient remains acute in ICU.  Anticipated for acute rehab upon transition.  WIll monitor for needs and remain available.

## 2024-05-02 NOTE — PROGRESS NOTE ADULT - PROBLEM SELECTOR PLAN 3
Elevated trop on admission likely 2/2 Afib w/ RVR   - TTE 4/26/2024: technically difficult study, LV no well visualized however LV probably normal based on limited views, moderate thickening of aortic valve leaflets, trace TR, dilated IVC with normal inspiratory collapse, normal pulmonary artery pressure  - Trop x2 both sets elevated but downtrended   - No complaints of chest pain, low suspicions of ACS --> monitor for signs and symptoms of ischemia   - Cardio following  - Plan per ICU Elevated trop on admission likely 2/2 Afib w/ RVR   - TTE 4/26/2024: technically difficult study, LV not well visualized however LV probably normal based on limited views, moderate thickening of aortic valve leaflets, trace TR, dilated IVC with normal inspiratory collapse, normal pulmonary artery pressure  - Trop x2 both sets elevated but downtrended   - No complaints of chest pain, low suspicions of ACS --> monitor for signs and symptoms of ischemia   - Cardio following  - Plan per ICU

## 2024-05-02 NOTE — PROGRESS NOTE ADULT - ASSESSMENT
83-year-old M with history of COPD on home O2 PRN, coronary artery disease s/p CABG,  hypertension, diabetes, hyperlipidemia, atrial fibrillation on Eliquis as well as chronic osteomyelitis who presents to the emergency department at [facility] complaining of rapid heart rate. Cardiology consulted for afib with rvr.    - Presenting with PAF with RVR  - At home he had noted some SOB, his wife checked his HR and noted it up was up to 160s so brought him to the ER, then placed on Cardizem gtt, had missed home BB   - on the morning of 4/27, patient was noted to have new onset aphasia and a code stroke was called.  He was deemed not a candidate for TNK as he is on AC.  He was found to have a L M3 occlusion but was deemed not a candidate for thrombectomy as occlusion too distal.  Later that night, a RRT was called due to worsening NIH score noted by nursing.  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   - Pressors as per Neuro for systolic BP goal, likely can start to normalize BP but will defer to Neuro  - back in NSR. Currently on PO Amiodarone loading.   - Now back on Eliquis 2.5mg BID. Dose reduced in the setting of renal function and age. Previous Cr of 1.4, if his renal function goes back to baseline of 1.4 would place on full dose Eliquis for CVA protection  - BB on hold for permissive hypertension, though can restart if neuro ok with it.  - Resume ASA as per Neuro  - resume statin when able  - Continue telemetry  - Monitor and replete electrolytes. Keep K>4.0 and Mg>2.0.  - remains on yolande, with high risk of decompensation.

## 2024-05-02 NOTE — PROGRESS NOTE ADULT - SUBJECTIVE AND OBJECTIVE BOX
Name: YUE COTTO  MRN: [id number]  LOCATION: [facility] ICU1 [facility]    ----  Patient is a 83y old  Male who presents with a chief complaint of AFib w/ RVR (02 May 2024 10:36)      FROM ADMISSION H+P:   HPI:  83yoM PMHx AFib on Eliquis, CAD, HTN, DM, HLD, COPD, osteomyelitis presents c/o shortness of breath, chest discomfort     ----  INTERVAL HPI/OVERNIGHT EVENTS: Pt seen and evaluated at the bedside. No acute overnight events occurred. Pt remains on phenylephrine. He had labile BP's today. Pt's spouse is at the bedside and she's able to report that the patient generally has been stable. No new clinical complaints. He continues to experience word finding difficulties. Large fluctuations in BP noted. Fever overnight.     ----  PAST MEDICAL & SURGICAL HISTORY:  Diabetes Mellitus, Type II      CAD (Coronary Artery Disease)  s/p 3v CABG ; stents [place]       Dyslipidemia      Osteomyelitis      COPD (chronic obstructive pulmonary disease)  on 2L at home and BiPAP at night; intubated       Hypertension      PVD (peripheral vascular disease)      History of PAT (paroxysmal atrial tachycardia)      Asthma with COPD      BPH (benign prostatic hyperplasia)      Acute osteomyelitis      CABG (Coronary Artery Bypass Graft)        Compound fracture  left leg      S/P primary angioplasty with coronary stent      H/O drainage of abscess  Left femur 2021          FAMILY HISTORY:  Family history of diabetes mellitus (Sibling)    Family hx of lung cancer  brother,  age 82, used to smoke with pt        Allergies    No Known Allergies    Intolerances        ----  REVIEW OF SYSTEMS:  Unable to obtain comprehensive reliable review of systems due to the patient's word finding difficulties but generally the patient denies any active complaints    ----  PHYSICAL EXAM:  GENERAL: patient appears advanced age, nontoxic-appearing, on room air  ENMT: oropharynx clear without erythema, dry mucous membranes, facial asymmetry noted  LUNGS: reduced air entry, no wheezing, coarse breath sounds throughout  HEART: S1/S2, regular rate and regular rhythm, distant heart sounds  GASTROINTESTINAL: abdomen is soft, nontender, nondistended, normoactive bowel sounds, no palpable masses  MUSCULOSKELETAL: no clubbing or cyanosis, no obvious deformity  NEUROLOGIC: awake, alert, readily interactive, right-sided weakness, word finding difficulties, unable to articulate some words clearly    T(C): 36.7 (24 @ 07:41), Max: 38.2 (24 @ 00:04)  HR: 80 (24 @ 10:00) (69 - 101)  BP: 175/81 (24 @ 10:00) (112/72 - 203/78)  RR: 21 (24 @ 10:00) (16 - 31)  SpO2: 95% (24 @ 10:00) (92% - 100%)  Wt(kg): --    ----  INTAKE & OUTPUT:  I&O's Summary    01 May 2024 07:01  -  02 May 2024 07:00  --------------------------------------------------------  IN: 2052.4 mL / OUT: 800 mL / NET: 1252.4 mL        LABS:                        13.9   9.75  )-----------( 225      ( 02 May 2024 05:30 )             44.3     05-02    138  |  106  |  17  ----------------------------<  196<H>  4.7   |  27  |  1.50<H>    Ca    9.0      02 May 2024 05:30  Phos  3.2     05-02  Mg     2.0     05-02        Urinalysis Basic - ( 02 May 2024 05:30 )    Color: x / Appearance: x / SG: x / pH: x  Gluc: 196 mg/dL / Ketone: x  / Bili: x / Urobili: x   Blood: x / Protein: x / Nitrite: x   Leuk Esterase: x / RBC: x / WBC x   Sq Epi: x / Non Sq Epi: x / Bacteria: x      CAPILLARY BLOOD GLUCOSE      POCT Blood Glucose.: 184 mg/dL (02 May 2024 11:19)  POCT Blood Glucose.: 169 mg/dL (02 May 2024 05:12)  POCT Blood Glucose.: 179 mg/dL (01 May 2024 23:12)  POCT Blood Glucose.: 179 mg/dL (01 May 2024 17:16)        Culture - Urine (collected 24 @ 01:15)  Source: Clean Catch Clean Catch (Midstream)  Final Report (24 @ 12:08):    No growth    Culture - Blood (collected 24 @ 23:50)  Source: .Blood Blood-Peripheral  Preliminary Report (24 @ 05:01):    No growth at 24 hours    Culture - Blood (collected 24 @ 23:45)  Source: .Blood Blood-Peripheral  Preliminary Report (24 @ 05:01):    No growth at 24 hours        ----  DATA REVIEW:  Personally reviewed:  -Vital sign trends:  febrile overnight, HR stable, BP labile, on room air today  -Laboratory results:  no leukocytosis, stable h/h, stable renal indices and leukocytosis  -Radiology results:  no new imaging  -Microbio results:  blood cultures neg from 24            ----  ACTIVE MEDICATIONS (by system):  acetaminophen   Oral Liquid .. 650 milliGRAM(s) Oral every 6 hours PRN  albuterol/ipratropium for Nebulization 3 milliLiter(s) Nebulizer every 6 hours  aMIOdarone    Tablet 400 milliGRAM(s) Oral every 8 hours  aMIOdarone    Tablet 400  Oral   apixaban 2.5 milliGRAM(s) Oral every 12 hours  atorvastatin 80 milliGRAM(s) Oral at bedtime  buDESOnide    Inhalation Suspension 0.5 milliGRAM(s) Inhalation two times a day  ciprofloxacin   IVPB 400 milliGRAM(s) IV Intermittent every 12 hours  dextrose 10% Bolus 125 milliLiter(s) IV Bolus once  dextrose 5%. 1000 milliLiter(s) IV Continuous <Continuous>  dextrose 5%. 1000 milliLiter(s) IV Continuous <Continuous>  dextrose 50% Injectable 12.5 Gram(s) IV Push once  dextrose 50% Injectable 25 Gram(s) IV Push once  insulin lispro (ADMELOG) corrective regimen sliding scale   SubCutaneous every 6 hours  pantoprazole   Suspension 40 milliGRAM(s) Oral daily  phenylephrine    Infusion 0.2 MICROgram(s)/kG/Min IV Continuous <Continuous>       Name: YUE COTTO  MRN: [id number]  LOCATION: [facility] ICU1 [facility]    ----  Patient is a 83y old  Male who presents with a chief complaint of AFib w/ RVR (02 May 2024 10:36)      FROM ADMISSION H+P:   HPI:  83yoM PMHx AFib on Eliquis, CAD, HTN, DM, HLD, COPD, osteomyelitis presents c/o shortness of breath, chest discomfort     ----  INTERVAL HPI/OVERNIGHT EVENTS: Pt seen and evaluated at the bedside. No acute overnight events occurred. Pt remains on phenylephrine. He had labile BP's today. Pt's spouse is at the bedside and she's able to report that the patient generally has been stable. No new clinical complaints. He continues to experience word finding difficulties. Large fluctuations in BP noted. Fever overnight.     ----  PAST MEDICAL & SURGICAL HISTORY:  Diabetes Mellitus, Type II      CAD (Coronary Artery Disease)  s/p 3v CABG ; stents placed [place]       Dyslipidemia      Osteomyelitis      COPD (chronic obstructive pulmonary disease)  on 2L at home and BiPAP at night; intubated       Hypertension      PVD (peripheral vascular disease)      History of PAT (paroxysmal atrial tachycardia)      Asthma with COPD      BPH (benign prostatic hyperplasia)      Acute osteomyelitis      CABG (Coronary Artery Bypass Graft)        Compound fracture  left leg      S/P primary angioplasty with coronary stent      H/O drainage of abscess  Left femur 2021          FAMILY HISTORY:  Family history of diabetes mellitus (Sibling)    Family hx of lung cancer  brother,  age 82, used to smoke with pt        Allergies    No Known Allergies    Intolerances        ----  REVIEW OF SYSTEMS:  Unable to obtain comprehensive reliable review of systems due to the patient's word finding difficulties but generally the patient denies any active complaints    ----  PHYSICAL EXAM:  GENERAL: patient appears advanced age, nontoxic-appearing, on room air  ENMT: oropharynx clear without erythema, dry mucous membranes, facial asymmetry noted  LUNGS: reduced air entry, no wheezing, coarse breath sounds throughout  HEART: S1/S2, regular rate and regular rhythm, distant heart sounds  GASTROINTESTINAL: abdomen is soft, nontender, nondistended, normoactive bowel sounds, no palpable masses  MUSCULOSKELETAL: no clubbing or cyanosis, no obvious deformity  NEUROLOGIC: awake, alert, readily interactive, right-sided weakness, word finding difficulties, unable to articulate some words clearly    T(C): 36.7 (24 @ 07:41), Max: 38.2 (24 @ 00:04)  HR: 80 (24 @ 10:00) (69 - 101)  BP: 175/81 (24 @ 10:00) (112/72 - 203/78)  RR: 21 (24 @ 10:00) (16 - 31)  SpO2: 95% (24 @ 10:00) (92% - 100%)  Wt(kg): --    ----  INTAKE & OUTPUT:  I&O's Summary    01 May 2024 07:01  -  02 May 2024 07:00  --------------------------------------------------------  IN: 2052.4 mL / OUT: 800 mL / NET: 1252.4 mL        LABS:                        13.9   9.75  )-----------( 225      ( 02 May 2024 05:30 )             44.3     05-02    138  |  106  |  17  ----------------------------<  196<H>  4.7   |  27  |  1.50<H>    Ca    9.0      02 May 2024 05:30  Phos  3.2     05-02  Mg     2.0     05-02        Urinalysis Basic - ( 02 May 2024 05:30 )    Color: x / Appearance: x / SG: x / pH: x  Gluc: 196 mg/dL / Ketone: x  / Bili: x / Urobili: x   Blood: x / Protein: x / Nitrite: x   Leuk Esterase: x / RBC: x / WBC x   Sq Epi: x / Non Sq Epi: x / Bacteria: x      CAPILLARY BLOOD GLUCOSE      POCT Blood Glucose.: 184 mg/dL (02 May 2024 11:19)  POCT Blood Glucose.: 169 mg/dL (02 May 2024 05:12)  POCT Blood Glucose.: 179 mg/dL (01 May 2024 23:12)  POCT Blood Glucose.: 179 mg/dL (01 May 2024 17:16)        Culture - Urine (collected 24 @ 01:15)  Source: Clean Catch Clean Catch (Midstream)  Final Report (24 @ 12:08):    No growth    Culture - Blood (collected 24 @ 23:50)  Source: .Blood Blood-Peripheral  Preliminary Report (24 @ 05:01):    No growth at 24 hours    Culture - Blood (collected 24 @ 23:45)  Source: .Blood Blood-Peripheral  Preliminary Report (24 @ 05:01):    No growth at 24 hours        ----  DATA REVIEW:  Personally reviewed:  -Vital sign trends:  febrile overnight, HR stable, BP labile, on room air today  -Laboratory results:  no leukocytosis, stable h/h, stable renal indices and leukocytosis  -Radiology results:  no new imaging  -Microbio results:  blood cultures neg from 24            ----  ACTIVE MEDICATIONS (by system):  - CV - aMIOdarone    Tablet 400 milliGRAM(s) Oral every 8 hours  - CV - aMIOdarone    Tablet 400  Oral  - CV - apixaban 2.5 milliGRAM(s) Oral every 12 hours  - CV - phenylephrine    Infusion 0.2 MICROgram(s)/kG/Min IV Continuous <Continuous>  - ENDO - insulin lispro (ADMELOG) corrective regimen sliding scale   SubCutaneous every 6 hours  - GI - pantoprazole   Suspension 40 milliGRAM(s) Oral daily  - ID - buDESOnide    Inhalation Suspension 0.5 milliGRAM(s) Inhalation two times a day  - MSK - acetaminophen   Oral Liquid .. 650 milliGRAM(s) Oral every 6 hours PRN  - PULM - albuterol/ipratropium for Nebulization 3 milliLiter(s) Nebulizer every 6 hours  - PULM - atorvastatin 80 milliGRAM(s) Oral at bedtime

## 2024-05-02 NOTE — PROGRESS NOTE ADULT - SUBJECTIVE AND OBJECTIVE BOX
Date/Time Patient Seen:  		  Referring MD:   Data Reviewed	       Patient is a 83y old  Male who presents with a chief complaint of AFib w/ RVR (01 May 2024 18:35)      Subjective/HPI     PAST MEDICAL & SURGICAL HISTORY:  Diabetes Mellitus, Type II    CAD (Coronary Artery Disease)  s/p 3v CABG 2004; stents placed [place]    Dyslipidemia    Asymptomatic Peripheral Vascular Disease    Osteomyelitis    COPD (chronic obstructive pulmonary disease)  on 2L at home and BiPAP at night; intubated 6/18    Diabetes mellitus    Hypertension    PVD (peripheral vascular disease)    History of PAT (paroxysmal atrial tachycardia)    Asthma with COPD    BPH (benign prostatic hyperplasia)    Acute osteomyelitis    CABG (Coronary Artery Bypass Graft)  2004    Compound fracture  left leg    S/P primary angioplasty with coronary stent    H/O drainage of abscess  Left femur 12/2021          Medication list         MEDICATIONS  (STANDING):  albuterol/ipratropium for Nebulization 3 milliLiter(s) Nebulizer every 6 hours  aMIOdarone    Tablet 400 milliGRAM(s) Oral every 8 hours  aMIOdarone    Tablet 400  Oral   apixaban 2.5 milliGRAM(s) Oral every 12 hours  atorvastatin 80 milliGRAM(s) Oral at bedtime  buDESOnide    Inhalation Suspension 0.5 milliGRAM(s) Inhalation two times a day  ciprofloxacin   IVPB 400 milliGRAM(s) IV Intermittent every 12 hours  dextrose 10% Bolus 125 milliLiter(s) IV Bolus once  dextrose 5%. 1000 milliLiter(s) (100 mL/Hr) IV Continuous <Continuous>  dextrose 5%. 1000 milliLiter(s) (50 mL/Hr) IV Continuous <Continuous>  dextrose 50% Injectable 25 Gram(s) IV Push once  dextrose 50% Injectable 12.5 Gram(s) IV Push once  insulin lispro (ADMELOG) corrective regimen sliding scale   SubCutaneous every 6 hours  pantoprazole    Tablet 40 milliGRAM(s) Oral before breakfast  phenylephrine    Infusion 0.2 MICROgram(s)/kG/Min (7.58 mL/Hr) IV Continuous <Continuous>    MEDICATIONS  (PRN):  acetaminophen   Oral Liquid .. 650 milliGRAM(s) Oral every 6 hours PRN Temp greater or equal to 38.5C (101.3F)         Vitals log        ICU Vital Signs Last 24 Hrs  T(C): 37.8 (02 May 2024 04:01), Max: 38.2 (02 May 2024 00:04)  T(F): 100 (02 May 2024 04:01), Max: 100.8 (02 May 2024 00:04)  HR: 90 (02 May 2024 05:30) (69 - 101)  BP: 180/86 (02 May 2024 05:30) (112/72 - 203/78)  BP(mean): 123 (02 May 2024 05:30) (82 - 139)  ABP: --  ABP(mean): --  RR: 28 (02 May 2024 05:30) (16 - 31)  SpO2: 95% (02 May 2024 05:30) (94% - 100%)    O2 Parameters below as of 02 May 2024 00:14  Patient On (Oxygen Delivery Method): BiPAP/CPAP                 Input and Output:  I&O's Detail    30 Apr 2024 07:01  -  01 May 2024 07:00  --------------------------------------------------------  IN:    Amiodarone: 317.3 mL    IV PiggyBack: 300 mL    IV PiggyBack: 500 mL    Phenylephrine: 1055.9 mL  Total IN: 2173.2 mL    OUT:    Amiodarone: 0 mL    Incontinent per Condom Catheter (mL): 2000 mL    Intermittent Catheterization - Urethral (mL): 300 mL  Total OUT: 2300 mL    Total NET: -126.8 mL      01 May 2024 07:01  -  02 May 2024 05:46  --------------------------------------------------------  IN:    Amiodarone: 66.8 mL    Free Water: 300 mL    Glucerna 1.5: 130 mL    IV PiggyBack: 200 mL    Phenylephrine: 1095 mL  Total IN: 1791.8 mL    OUT:  Total OUT: 0 mL    Total NET: 1791.8 mL          Lab Data                        13.5   9.57  )-----------( 216      ( 01 May 2024 05:29 )             42.0     05-01    138  |  104  |  16  ----------------------------<  223<H>  4.2   |  27  |  1.50<H>    Ca    9.2      01 May 2024 05:29  Phos  3.3     05-01  Mg     1.8     05-01              Review of Systems	      Objective     Physical Examination    heart s1s2  lung dc BS  head nc      Pertinent Lab findings & Imaging      Eliecer:  NO   Adequate UO     I&O's Detail    30 Apr 2024 07:01  -  01 May 2024 07:00  --------------------------------------------------------  IN:    Amiodarone: 317.3 mL    IV PiggyBack: 300 mL    IV PiggyBack: 500 mL    Phenylephrine: 1055.9 mL  Total IN: 2173.2 mL    OUT:    Amiodarone: 0 mL    Incontinent per Condom Catheter (mL): 2000 mL    Intermittent Catheterization - Urethral (mL): 300 mL  Total OUT: 2300 mL    Total NET: -126.8 mL      01 May 2024 07:01  -  02 May 2024 05:46  --------------------------------------------------------  IN:    Amiodarone: 66.8 mL    Free Water: 300 mL    Glucerna 1.5: 130 mL    IV PiggyBack: 200 mL    Phenylephrine: 1095 mL  Total IN: 1791.8 mL    OUT:  Total OUT: 0 mL    Total NET: 1791.8 mL               Discussed with:     Cultures:	        Radiology

## 2024-05-02 NOTE — PROGRESS NOTE ADULT - PROBLEM SELECTOR PLAN 1
- MRI Head: Multiple acute infarcts scattered throughout the bilateral cerebral and cerebellar hemispheres. More dominant acute to subacute infarction in the posterior left MCA vascular distribution. Chronic small vessel disease.  - No evidence of hemorrhagic conversion -repeat head CT from May 1 with no acute findings  -Resume Eliquis  - Maintains on a phenylephrine infusion to elevate MAP  - Continue aspirin suppository  - Continue phenylephrine gtt to maintain -170 for permissive HTN   - Neuro checks q4h  - The patient remains n.p.o.  - NG tube placed for feeds  - Plan for possible MBS once otherwise clinically appropriate  - Fall and aspiration precautions   - Plan per ICU - MRI Head: Multiple acute infarcts scattered throughout the bilateral cerebral and cerebellar hemispheres. More dominant acute to subacute infarction in the posterior left MCA vascular distribution. Chronic small vessel disease.  - No evidence of hemorrhagic conversion -repeat head CT from May 1 with no acute findings  -Resume Eliquis  - Maintains on a phenylephrine infusion to maintain MAP  - Continue aspirin suppository  - Continue phenylephrine gtt to maintain -170 for permissive HTN   - Neuro checks q4h  - The patient remains n.p.o.  - NG tube placed for feeds  - Plan for possible MBS once otherwise clinically appropriate  - Fall and aspiration precautions   - Plan per ICU

## 2024-05-02 NOTE — PROGRESS NOTE ADULT - PROBLEM SELECTOR PLAN 4
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  - R big toe dressing c/d/i  - Continue Cipro    - Continue local wound care   - ID following   - Podiatry following  - Plan per ICU 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  - R hallux dressing c/d/i  - Continue Cipro    - Continue local wound care   - ID following   - Podiatry following  - Plan per ICU

## 2024-05-02 NOTE — PROGRESS NOTE ADULT - ATTENDING COMMENTS
84 yo man with Hx eosinophilic asthma/COPD, chronic hypoxemic and hypercapnic respiratory failure, afib on eliquis, CAD, DM2, L femur Fx with chronic osto, admitted with uncontrolled afib, course complicated by acute L MCA CVA resulting in R hemiparesis and dysarthria.  Not a TNK candidate as he was on full AC.    --posterior L MCA CVA with multiple smaller acute b/l cerebral and cerebellar strokes  continue eliquis and statin   continue induced htn with phenylephrine SBP goal now 140-160  PT/OT/speech/swallow evals  --afib controlled on amio  --asthma, continue inhaled steroids, bronchodilators  due for nucala injection on 5/3, wife will bring in  chronic hypercapnic respiratory failure, continue BiPAP at night or with sleep  --CKD stable  --dysphagia, continue TF via NGT, MBS tomorrow  --chronic LE osteo, continue cipro  episode of fever, normal WBC, monitor off Abx for now and f/u Cx  --DM2 controlled on ISS  --plan discussed with wife

## 2024-05-02 NOTE — PROGRESS NOTE ADULT - SUBJECTIVE AND OBJECTIVE BOX
Neurology Follow up note    YUE [id number]    HPI:  83yoM PMHx AFib on Eliquis, CAD, HTN, DM, HLD, COPD, osteomyelitis presents c/o shortness of breath, chest discomfort. Pt reports onset of rapid heart rate this morning, HR measured 160s when he checked his pulse ox. Also endorses dyspnea exacerbated by movement. Pt reports last episode of AFib in 2020, no episodes since then until today's presentation. Pt states that chest discomfort feels like chest pressure, no chest pain. Pt also reports chronic R big toe wound for the past few months, follows w/ Wound Care. States that he has increased sensitivity to R sole of foot, but denies pain, numbness/tingling. Denies fevers, chills, abdominal pain, N/V/D/C.     IN THE ED:  Temp 98  F ,  , /81  ,RR 18 , SpO2 94%  S/P PO , IV diltiazem 10mg x2, IV diltiazem gtt @ 10 mg/hr  Labs significant for BUN/Cr 25/1.6, troponin 507.9, pBNP 355  Imaging:   CXR wet read: no gross abnormalities (25 Apr 2024 12:13)      Interval History -tolerating AC    Patient is seen, chart was reviewed and case was discussed with the treatment team.  Pt is not in any distress.   Lying on bed comfortably.     .    Vital Signs Last 24 Hrs  T(C): 37.1 (02 May 2024 15:12), Max: 38.2 (02 May 2024 00:04)  T(F): 98.7 (02 May 2024 15:12), Max: 100.8 (02 May 2024 00:04)  HR: 96 (02 May 2024 16:30) (77 - 101)  BP: 163/78 (02 May 2024 16:30) (115/83 - 209/84)  BP(mean): 112 (02 May 2024 16:30) (87 - 139)  RR: 34 (02 May 2024 16:30) (16 - 34)  SpO2: 95% (02 May 2024 16:30) (92% - 100%)    Parameters below as of 02 May 2024 16:00  Patient On (Oxygen Delivery Method): room air                        REVIEW OF SYSTEMS:    Constitutional: No fever, weight loss or fatigue  Eyes: No eye pain, visual disturbances, or discharge  ENT:  No difficulty hearing, tinnitus, vertigo; No sinus or throat pain  Neck: No pain or stiffness  Respiratory: No wheezing, chills or hemoptysis  Cardiovascular: No chest pain, palpitations, shortness of breath, dizziness  Gastrointestinal: No abdominal or epigastric pain. No nausea, vomiting or hematemesis  Genitourinary: No dysuria, frequency, hematuria or incontinence  Neurological: No headaches, numbness or tremors  Psychiatric: No depression, anxiety, mood swings or difficulty sleeping  Musculoskeletal: No joint pain or swelling; No muscle, back or extremity pain  Skin: No itching, burning, rashes or lesions   Lymph Nodes: No enlarged glands  Endocrine: No heat or cold intolerance; No hair loss,   Allergy and Immunologic: No hives or eczema    On Neurological Examination:    Mental Status - Pt is alert, awake, oriented X3.. Follows commands well and able to answer questions appropriately.Mood and affect  normal    Speech -  MIXED APHASIA    Cranial Nerves - Pupils 3 mm equal and reactive to light, extraocular eye movements intact. Pt has no visual field deficit.  Pt has right facial weakness  . Facial sensation is intact.Tongue - is in midline.    Muscle tone - is decreased on right  .      Motor Exam -right hemiparesis; RUE 1/5; LE 1-2/5   No shaking or tremors.    Sensory Exam -. Pt withdraws all extremities equally on stimulation. No asymmetry seen. No complaints of tingling, numbness.        coordination:    Finger to nose: normal-left        Deep tendon Reflexes - 2 plus all over.            Neck Supple -  Yes.     MEDICATIONS  (STANDING):  albuterol/ipratropium for Nebulization 3 milliLiter(s) Nebulizer every 6 hours  aMIOdarone    Tablet 400 milliGRAM(s) Oral every 8 hours  aMIOdarone    Tablet 400  Oral   apixaban 2.5 milliGRAM(s) Oral every 12 hours  aspirin  chewable 81 milliGRAM(s) Enteral Tube daily  atorvastatin 80 milliGRAM(s) Oral at bedtime  buDESOnide    Inhalation Suspension 0.5 milliGRAM(s) Inhalation two times a day  ciprofloxacin   IVPB 400 milliGRAM(s) IV Intermittent every 12 hours  dextrose 10% Bolus 125 milliLiter(s) IV Bolus once  dextrose 5%. 1000 milliLiter(s) (100 mL/Hr) IV Continuous <Continuous>  dextrose 5%. 1000 milliLiter(s) (50 mL/Hr) IV Continuous <Continuous>  dextrose 50% Injectable 25 Gram(s) IV Push once  dextrose 50% Injectable 12.5 Gram(s) IV Push once  insulin lispro (ADMELOG) corrective regimen sliding scale   SubCutaneous every 6 hours  pantoprazole   Suspension 40 milliGRAM(s) Oral daily  phenylephrine    Infusion 0.2 MICROgram(s)/kG/Min (7.58 mL/Hr) IV Continuous <Continuous>    MEDICATIONS  (PRN):  acetaminophen   Oral Liquid .. 650 milliGRAM(s) Oral every 6 hours PRN Temp greater or equal to 38.5C (101.3F)        Allergies    No Known Allergies    Intolerances                                     13.9   9.75  )-----------( 225      ( 02 May 2024 05:30 )             44.3       Hemoglobin A1C:     Vitamin B12     RADIOLOGY    ASSESSMENT AND PLAN:      Seen for right sided weakness/ams  consistent subacute left mca infarct  also multiple punctate subacute cerebellar infarcts  most likely cardio-embolic    ON AC  permissive -150  management dw critical care team  Physical therapy evaluation.  Speech/swallowing eval in progress  Pain is accessed and addressed.  Plan of care was discussed with family(wife_. Questions answered.  Would continue to follow.

## 2024-05-02 NOTE — PROGRESS NOTE ADULT - SUBJECTIVE AND OBJECTIVE BOX
Interval Events: Pt seen and examined at bedside. Yesterday, pt transitioned from amio gtt, now on amio PO through NGT. Pt febrile to 100.8 overnight. Plan for MBS today to assess swallow function.       Review of Systems:  Constitutional: No fever, chills  Neuro: + weakness. No headache  Resp: No cough, shortness of breath  CVS: No chest pain  GI: No abdominal pain, nausea  : No dysuria    ICU Vital Signs Last 24 Hrs  T(C): 36.7 (02 May 2024 07:41), Max: 38.2 (02 May 2024 00:04)  T(F): 98 (02 May 2024 07:41), Max: 100.8 (02 May 2024 00:04)  HR: 80 (02 May 2024 10:00) (69 - 101)  BP: 175/81 (02 May 2024 10:00) (112/72 - 203/78)  BP(mean): 116 (02 May 2024 10:00) (82 - 139)  ABP: --  ABP(mean): --  RR: 21 (02 May 2024 10:00) (16 - 31)  SpO2: 95% (02 May 2024 10:00) (92% - 100%)    O2 Parameters below as of 02 May 2024 08:00  Patient On (Oxygen Delivery Method): room air              05-01-24 @ 07:01  -  05-02-24 @ 07:00  --------------------------------------------------------  IN: 2052.4 mL / OUT: 800 mL / NET: 1252.4 mL        CAPILLARY BLOOD GLUCOSE      POCT Blood Glucose.: 184 mg/dL (02 May 2024 11:19)      I&O's Summary    01 May 2024 07:01  -  02 May 2024 07:00  --------------------------------------------------------  IN: 2052.4 mL / OUT: 800 mL / NET: 1252.4 mL        Physical Exam:   Gen: Awake, somnolent. Laying in bed comfortably  Neuro: +R-sided hemiparesis in upper > lower extremities. +Moves R foot, improving. R arm still with no movement. +aphasia and dysarthria. PERRLA.   HEENT: +NGT. NC/AT, +L cataract  Resp: CTA B/L. No accessory muscle use. No wheezing.   CVS: RRR. +S1/S2. No murmurs  Abd: Soft. NT/ND. +BS  Ext: No edema in b/l lower extremity. +Chronic R big toe wound w/ associated desquamation of skin, no erythema or drainage    Meds:  ciprofloxacin   IVPB IV Intermittent    aMIOdarone    Tablet Oral  aMIOdarone    Tablet Oral  phenylephrine    Infusion IV Continuous    atorvastatin Oral  dextrose 50% Injectable IV Push  dextrose 50% Injectable IV Push  insulin lispro (ADMELOG) corrective regimen sliding scale SubCutaneous    albuterol/ipratropium for Nebulization Nebulizer  buDESOnide    Inhalation Suspension Inhalation    acetaminophen   Oral Liquid .. Oral PRN      apixaban Oral    pantoprazole   Suspension Oral      dextrose 10% Bolus IV Bolus  dextrose 5%. IV Continuous  dextrose 5%. IV Continuous                                  13.9   9.75  )-----------( 225      ( 02 May 2024 05:30 )             44.3       05-02    138  |  106  |  17  ----------------------------<  196<H>  4.7   |  27  |  1.50<H>    Ca    9.0      02 May 2024 05:30  Phos  3.2     05-02  Mg     2.0     05-02              Urinalysis Basic - ( 02 May 2024 05:30 )    Color: x / Appearance: x / SG: x / pH: x  Gluc: 196 mg/dL / Ketone: x  / Bili: x / Urobili: x   Blood: x / Protein: x / Nitrite: x   Leuk Esterase: x / RBC: x / WBC x   Sq Epi: x / Non Sq Epi: x / Bacteria: x      Clean Catch Clean Catch (Midstream)   No growth -- 05-01 @ 01:15  .Blood Blood-Peripheral   No growth at 24 hours -- 04-30 @ 23:50  .Blood Blood-Peripheral   No growth at 24 hours -- 04-30 @ 23:45      Radiology:  US Duplex Venous Upper Ext Ltd, Left (05.01.24 @ 09:23):      No evidence of left upper extremity deep venous thrombosis.    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    No acute intracranial hemorrhage.    Tubes/Lines: LUE peripheral IV            GLOBAL ISSUE/BEST PRACTICE:  Analgesia: N  Sedation: N  HOB elevation: Y  Stress ulcer prophylaxis: Y  VTE prophylaxis: Y  Glycemic control: Y  Nutrition: Y    CODE STATUS: Full Code       Interval Events: Pt seen and examined at bedside. Pt febrile to 100.8 overnight. Plan for MBS today to assess swallow function.       Review of Systems:  Constitutional: No fever, chills  Neuro: + weakness. No headache  Resp: No cough, shortness of breath  CVS: No chest pain  GI: No abdominal pain, nausea  : No dysuria    ICU Vital Signs Last 24 Hrs  T(C): 36.7 (02 May 2024 07:41), Max: 38.2 (02 May 2024 00:04)  T(F): 98 (02 May 2024 07:41), Max: 100.8 (02 May 2024 00:04)  HR: 80 (02 May 2024 10:00) (69 - 101)  BP: 175/81 (02 May 2024 10:00) (112/72 - 203/78)  BP(mean): 116 (02 May 2024 10:00) (82 - 139)  ABP: --  ABP(mean): --  RR: 21 (02 May 2024 10:00) (16 - 31)  SpO2: 95% (02 May 2024 10:00) (92% - 100%)    O2 Parameters below as of 02 May 2024 08:00  Patient On (Oxygen Delivery Method): room air              05-01-24 @ 07:01  -  05-02-24 @ 07:00  --------------------------------------------------------  IN: 2052.4 mL / OUT: 800 mL / NET: 1252.4 mL        CAPILLARY BLOOD GLUCOSE      POCT Blood Glucose.: 184 mg/dL (02 May 2024 11:19)      I&O's Summary    01 May 2024 07:01  -  02 May 2024 07:00  --------------------------------------------------------  IN: 2052.4 mL / OUT: 800 mL / NET: 1252.4 mL        Physical Exam:   Gen: Awake, somnolent. Laying in bed comfortably  Neuro: +R-sided hemiparesis in upper > lower extremities. +Moves R foot, improving. R arm still with no movement. +aphasia and dysarthria. PERRLA.   HEENT: +NGT. NC/AT, +L cataract  Resp: CTA B/L. No accessory muscle use. No wheezing.   CVS: RRR. +S1/S2. No murmurs  Abd: Soft. NT/ND. +BS  Ext: No edema in b/l lower extremity. +Chronic R big toe wound w/ associated desquamation of skin, no erythema or drainage    Meds:  ciprofloxacin   IVPB IV Intermittent    aMIOdarone    Tablet Oral  aMIOdarone    Tablet Oral  phenylephrine    Infusion IV Continuous    atorvastatin Oral  dextrose 50% Injectable IV Push  dextrose 50% Injectable IV Push  insulin lispro (ADMELOG) corrective regimen sliding scale SubCutaneous    albuterol/ipratropium for Nebulization Nebulizer  buDESOnide    Inhalation Suspension Inhalation    acetaminophen   Oral Liquid .. Oral PRN      apixaban Oral    pantoprazole   Suspension Oral      dextrose 10% Bolus IV Bolus  dextrose 5%. IV Continuous  dextrose 5%. IV Continuous                                  13.9   9.75  )-----------( 225      ( 02 May 2024 05:30 )             44.3       05-02    138  |  106  |  17  ----------------------------<  196<H>  4.7   |  27  |  1.50<H>    Ca    9.0      02 May 2024 05:30  Phos  3.2     05-02  Mg     2.0     05-02              Urinalysis Basic - ( 02 May 2024 05:30 )    Color: x / Appearance: x / SG: x / pH: x  Gluc: 196 mg/dL / Ketone: x  / Bili: x / Urobili: x   Blood: x / Protein: x / Nitrite: x   Leuk Esterase: x / RBC: x / WBC x   Sq Epi: x / Non Sq Epi: x / Bacteria: x      Clean Catch Clean Catch (Midstream)   No growth -- 05-01 @ 01:15  .Blood Blood-Peripheral   No growth at 24 hours -- 04-30 @ 23:50  .Blood Blood-Peripheral   No growth at 24 hours -- 04-30 @ 23:45      Radiology:  US Duplex Venous Upper Ext Ltd, Left (05.01.24 @ 09:23):      No evidence of left upper extremity deep venous thrombosis.    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    No acute intracranial hemorrhage.    Tubes/Lines: LUE peripheral IV            GLOBAL ISSUE/BEST PRACTICE:  Analgesia: N  Sedation: N  HOB elevation: Y  Stress ulcer prophylaxis: Y  VTE prophylaxis: Y  Glycemic control: Y  Nutrition: Y    CODE STATUS: Full Code

## 2024-05-03 NOTE — PROGRESS NOTE ADULT - SUBJECTIVE AND OBJECTIVE BOX
INTERVAL HPI/OVERNIGHT EVENTS: No acute overnight events occurred.    SUBJECTIVE: Seen and examined pt at bedside. Has no acute complaints at this time.    Review of Systems:  Constitutional: No fever, chills, fatigue  Neuro: No headache, numbness, weakness  Resp: No cough, wheezing, shortness of breath  CVS: No chest pain, palpitations, leg swelling  GI: No abdominal pain, nausea, vomiting, diarrhea   : No dysuria, frequency, incontinence  Skin: No itching, burning, rashes, or lesions   Msk: No joint pain or swelling  Psych: No depression, anxiety, mood swings    ICU Vital Signs Last 24 Hrs  T(C): 36.6 (03 May 2024 05:04), Max: 37.3 (02 May 2024 20:03)  T(F): 97.9 (03 May 2024 05:04), Max: 99.2 (02 May 2024 20:03)  HR: 99 (03 May 2024 07:00) (77 - 118)  BP: 128/59 (03 May 2024 07:00) (101/56 - 209/84)  BP(mean): 86 (03 May 2024 07:00) (69 - 129)  ABP: --  ABP(mean): --  RR: 23 (03 May 2024 07:00) (18 - 34)  SpO2: 93% (03 May 2024 07:00) (92% - 100%)    O2 Parameters below as of 03 May 2024 01:06  Patient On (Oxygen Delivery Method): BiPAP/CPAP, 28              05-02-24 @ 07:01  -  05-03-24 @ 07:00  --------------------------------------------------------  IN: 2813.6 mL / OUT: 1650 mL / NET: 1163.7 mL        CAPILLARY BLOOD GLUCOSE      POCT Blood Glucose.: 233 mg/dL (03 May 2024 05:22)      I&O's Summary    02 May 2024 07:01  -  03 May 2024 07:00  --------------------------------------------------------  IN: 2813.6 mL / OUT: 1650 mL / NET: 1163.7 mL        PHYSICAL EXAM:  Gen: Awake, somnolent. Laying in bed comfortably, NGT in place  Neuro: +R-sided hemiparesis in upper > lower extremities.   +aphasia and dysarthria. PERRLA. RUE 1/5, RLE 0/5, LUE 3/5, LLE 0/5  HEENT: +NGT. NC/AT, +L cataract  Resp: CTA B/L. No accessory muscle use. No wheezing.   CVS: RRR. +S1/S2. No murmurs  Abd: Soft. NT/ND. +BS  Ext: No edema in b/l lower extremity. +Chronic R big toe wound w/ associated desquamation of skin, no erythema or drainage      Meds:  ciprofloxacin   IVPB IV Intermittent    aMIOdarone    Tablet Oral  aMIOdarone    Tablet Oral  phenylephrine    Infusion IV Continuous    atorvastatin Oral  dextrose 50% Injectable IV Push  dextrose 50% Injectable IV Push  insulin lispro (ADMELOG) corrective regimen sliding scale SubCutaneous    albuterol/ipratropium for Nebulization Nebulizer  buDESOnide    Inhalation Suspension Inhalation    acetaminophen   Oral Liquid .. Oral PRN      apixaban Oral  aspirin  chewable Enteral Tube    pantoprazole   Suspension Oral      dextrose 10% Bolus IV Bolus  dextrose 5%. IV Continuous  dextrose 5%. IV Continuous    mepolizumab Subcutaneous Injectable SubCutaneous                                13.9   9.75  )-----------( 225      ( 02 May 2024 05:30 )             44.3       05-02    138  |  106  |  17  ----------------------------<  196<H>  4.7   |  27  |  1.50<H>    Ca    9.0      02 May 2024 05:30  Phos  3.2     05-02  Mg     2.0     05-02              Urinalysis Basic - ( 02 May 2024 05:30 )    Color: x / Appearance: x / SG: x / pH: x  Gluc: 196 mg/dL / Ketone: x  / Bili: x / Urobili: x   Blood: x / Protein: x / Nitrite: x   Leuk Esterase: x / RBC: x / WBC x   Sq Epi: x / Non Sq Epi: x / Bacteria: x      Clean Catch Clean Catch (Midstream)   No growth -- 05-01 @ 01:15  .Blood Blood-Peripheral   No growth at 48 Hours -- 04-30 @ 23:50  .Blood Blood-Peripheral   No growth at 48 Hours -- 04-30 @ 23:45              Radiology:    Bedside Ultrasound:    Tubes/Lines: NGT         GLOBAL ISSUE/BEST PRACTICE:  Analgesia: N  Sedation: N  HOB elevation: Y  Stress ulcer prophylaxis: Y  VTE prophylaxis: Y  Glycemic control: Y  Nutrition: Y    CODE STATUS: Full Code       INTERVAL HPI/OVERNIGHT EVENTS: No acute overnight events occurred.    SUBJECTIVE: Seen and examined pt at bedside. Has no acute complaints at this time.    Review of Systems:  Constitutional: No fever, chills, fatigue  Neuro: No headache, numbness, +weakness  Resp: No cough, wheezing, shortness of breath  CVS: No chest pain, palpitations, leg swelling  GI: No abdominal pain, nausea, vomiting, diarrhea   : No dysuria, frequency, incontinence      ICU Vital Signs Last 24 Hrs  T(C): 36.6 (03 May 2024 05:04), Max: 37.3 (02 May 2024 20:03)  T(F): 97.9 (03 May 2024 05:04), Max: 99.2 (02 May 2024 20:03)  HR: 99 (03 May 2024 07:00) (77 - 118)  BP: 128/59 (03 May 2024 07:00) (101/56 - 209/84)  BP(mean): 86 (03 May 2024 07:00) (69 - 129)  ABP: --  ABP(mean): --  RR: 23 (03 May 2024 07:00) (18 - 34)  SpO2: 93% (03 May 2024 07:00) (92% - 100%)    O2 Parameters below as of 03 May 2024 01:06  Patient On (Oxygen Delivery Method): BiPAP/CPAP, 28              05-02-24 @ 07:01  -  05-03-24 @ 07:00  --------------------------------------------------------  IN: 2813.6 mL / OUT: 1650 mL / NET: 1163.7 mL        CAPILLARY BLOOD GLUCOSE      POCT Blood Glucose.: 233 mg/dL (03 May 2024 05:22)      I&O's Summary    02 May 2024 07:01  -  03 May 2024 07:00  --------------------------------------------------------  IN: 2813.6 mL / OUT: 1650 mL / NET: 1163.7 mL        PHYSICAL EXAM:  Gen: Awake, somnolent. Laying in bed comfortably, NGT in place  Neuro: +R-sided hemiparesis in upper > lower extremities.   +aphasia and dysarthria. PERRLA. RUE 1/5, RLE 0/5, LUE 3/5, LLE 0/5  HEENT: +NGT. NC/AT, +L cataract  Resp: CTA B/L. No accessory muscle use. No wheezing.   CVS: RRR. +S1/S2. No murmurs  Abd: Soft. NT/ND. +BS  Ext: No edema in b/l lower extremity. +Chronic R big toe wound w/ associated desquamation of skin, no erythema or drainage      Meds:  ciprofloxacin   IVPB IV Intermittent    aMIOdarone    Tablet Oral  aMIOdarone    Tablet Oral  phenylephrine    Infusion IV Continuous    atorvastatin Oral  dextrose 50% Injectable IV Push  dextrose 50% Injectable IV Push  insulin lispro (ADMELOG) corrective regimen sliding scale SubCutaneous    albuterol/ipratropium for Nebulization Nebulizer  buDESOnide    Inhalation Suspension Inhalation    acetaminophen   Oral Liquid .. Oral PRN      apixaban Oral  aspirin  chewable Enteral Tube    pantoprazole   Suspension Oral      dextrose 10% Bolus IV Bolus  dextrose 5%. IV Continuous  dextrose 5%. IV Continuous    mepolizumab Subcutaneous Injectable SubCutaneous                                13.9   9.75  )-----------( 225      ( 02 May 2024 05:30 )             44.3       05-02    138  |  106  |  17  ----------------------------<  196<H>  4.7   |  27  |  1.50<H>    Ca    9.0      02 May 2024 05:30  Phos  3.2     05-02  Mg     2.0     05-02              Urinalysis Basic - ( 02 May 2024 05:30 )    Color: x / Appearance: x / SG: x / pH: x  Gluc: 196 mg/dL / Ketone: x  / Bili: x / Urobili: x   Blood: x / Protein: x / Nitrite: x   Leuk Esterase: x / RBC: x / WBC x   Sq Epi: x / Non Sq Epi: x / Bacteria: x      Clean Catch Clean Catch (Midstream)   No growth -- 05-01 @ 01:15  .Blood Blood-Peripheral   No growth at 48 Hours -- 04-30 @ 23:50  .Blood Blood-Peripheral   No growth at 48 Hours -- 04-30 @ 23:45              Radiology:    Bedside Ultrasound:    Tubes/Lines: NGT         GLOBAL ISSUE/BEST PRACTICE:  Analgesia: N  Sedation: N  HOB elevation: Y  Stress ulcer prophylaxis: Y  VTE prophylaxis: Y  Glycemic control: Mod ISS  Nutrition: Y    CODE STATUS: Full Code       INTERVAL HPI/OVERNIGHT EVENTS: No acute overnight events occurred.    SUBJECTIVE: Seen and examined pt at bedside. Has no acute complaints at this time.    Review of Systems:  Constitutional: No fever, chills, fatigue  Neuro: No headache, numbness, +weakness  Resp: No cough, wheezing, shortness of breath  CVS: No chest pain, palpitations, leg swelling  GI: No abdominal pain, nausea, vomiting, diarrhea   : No dysuria, frequency, incontinence      ICU Vital Signs Last 24 Hrs  T(C): 36.6 (03 May 2024 05:04), Max: 37.3 (02 May 2024 20:03)  T(F): 97.9 (03 May 2024 05:04), Max: 99.2 (02 May 2024 20:03)  HR: 99 (03 May 2024 07:00) (77 - 118)  BP: 128/59 (03 May 2024 07:00) (101/56 - 209/84)  BP(mean): 86 (03 May 2024 07:00) (69 - 129)  ABP: --  ABP(mean): --  RR: 23 (03 May 2024 07:00) (18 - 34)  SpO2: 93% (03 May 2024 07:00) (92% - 100%)    O2 Parameters below as of 03 May 2024 01:06  Patient On (Oxygen Delivery Method): BiPAP/CPAP, 28              05-02-24 @ 07:01  -  05-03-24 @ 07:00  --------------------------------------------------------  IN: 2813.6 mL / OUT: 1650 mL / NET: 1163.7 mL        CAPILLARY BLOOD GLUCOSE      POCT Blood Glucose.: 233 mg/dL (03 May 2024 05:22)      I&O's Summary    02 May 2024 07:01  -  03 May 2024 07:00  --------------------------------------------------------  IN: 2813.6 mL / OUT: 1650 mL / NET: 1163.7 mL        PHYSICAL EXAM:  Gen: Awake, somnolent. Laying in bed comfortably, NGT in place  Neuro: +R-sided hemiparesis in upper > lower extremities.   +aphasia and dysarthria. PERRLA. RUE 1/5, RLE 0/5, LUE 3/5, LLE 1/5  HEENT: +NGT. NC/AT, +L cataract  Resp: Course breath sounds B/L. No accessory muscle use. No wheezing.   CVS: RRR. +S1/S2. No murmurs  Abd: Soft. NT/ND. +BS  Ext: No edema in b/l lower extremity. +Chronic R big toe wound w/ associated desquamation of skin, no erythema or drainage      Meds:  ciprofloxacin   IVPB IV Intermittent    aMIOdarone    Tablet Oral  aMIOdarone    Tablet Oral  phenylephrine    Infusion IV Continuous    atorvastatin Oral  dextrose 50% Injectable IV Push  dextrose 50% Injectable IV Push  insulin lispro (ADMELOG) corrective regimen sliding scale SubCutaneous    albuterol/ipratropium for Nebulization Nebulizer  buDESOnide    Inhalation Suspension Inhalation    acetaminophen   Oral Liquid .. Oral PRN      apixaban Oral  aspirin  chewable Enteral Tube    pantoprazole   Suspension Oral      dextrose 10% Bolus IV Bolus  dextrose 5%. IV Continuous  dextrose 5%. IV Continuous    mepolizumab Subcutaneous Injectable SubCutaneous                                13.9   9.75  )-----------( 225      ( 02 May 2024 05:30 )             44.3       05-02    138  |  106  |  17  ----------------------------<  196<H>  4.7   |  27  |  1.50<H>    Ca    9.0      02 May 2024 05:30  Phos  3.2     05-02  Mg     2.0     05-02              Urinalysis Basic - ( 02 May 2024 05:30 )    Color: x / Appearance: x / SG: x / pH: x  Gluc: 196 mg/dL / Ketone: x  / Bili: x / Urobili: x   Blood: x / Protein: x / Nitrite: x   Leuk Esterase: x / RBC: x / WBC x   Sq Epi: x / Non Sq Epi: x / Bacteria: x      Clean Catch Clean Catch (Midstream)   No growth -- 05-01 @ 01:15  .Blood Blood-Peripheral   No growth at 48 Hours -- 04-30 @ 23:50  .Blood Blood-Peripheral   No growth at 48 Hours -- 04-30 @ 23:45              Radiology:    Bedside Ultrasound:    Tubes/Lines: NGT         GLOBAL ISSUE/BEST PRACTICE:  Analgesia: N  Sedation: N  HOB elevation: Y  Stress ulcer prophylaxis: Y  VTE prophylaxis: Y  Glycemic control: Mod ISS  Nutrition: Y    CODE STATUS: Full Code       INTERVAL HPI/OVERNIGHT EVENTS: No acute overnight events occurred.    SUBJECTIVE: Seen and examined pt at bedside. Has no acute complaints at this time.    Review of Systems:  Constitutional: No fever, chills, fatigue  Neuro: No headache, numbness, +weakness  Resp: No cough, wheezing, shortness of breath  CVS: No chest pain, palpitations, leg swelling  GI: No abdominal pain, nausea, vomiting, diarrhea   : No dysuria, frequency, incontinence      ICU Vital Signs Last 24 Hrs  T(C): 36.6 (03 May 2024 05:04), Max: 37.3 (02 May 2024 20:03)  T(F): 97.9 (03 May 2024 05:04), Max: 99.2 (02 May 2024 20:03)  HR: 99 (03 May 2024 07:00) (77 - 118)  BP: 128/59 (03 May 2024 07:00) (101/56 - 209/84)  BP(mean): 86 (03 May 2024 07:00) (69 - 129)  ABP: --  ABP(mean): --  RR: 23 (03 May 2024 07:00) (18 - 34)  SpO2: 93% (03 May 2024 07:00) (92% - 100%)    O2 Parameters below as of 03 May 2024 01:06  Patient On (Oxygen Delivery Method): BiPAP/CPAP, 28              05-02-24 @ 07:01  -  05-03-24 @ 07:00  --------------------------------------------------------  IN: 2813.6 mL / OUT: 1650 mL / NET: 1163.7 mL        CAPILLARY BLOOD GLUCOSE      POCT Blood Glucose.: 233 mg/dL (03 May 2024 05:22)      I&O's Summary    02 May 2024 07:01  -  03 May 2024 07:00  --------------------------------------------------------  IN: 2813.6 mL / OUT: 1650 mL / NET: 1163.7 mL        PHYSICAL EXAM:  Gen: Awake, somnolent. Laying in bed comfortably,   Neuro: +R-sided hemiparesis in upper > lower extremities.   +aphasia and dysarthria. PERRLA. RUE 1/5, RLE 0/5, LUE 3/5, LLE 1/5  HEENT: +NGT. NC/AT, +L cataract  Resp: Course breath sounds B/L. No accessory muscle use. No wheezing.   CVS: RRR. +S1/S2. No murmurs  Abd: Soft. NT/ND. +BS  Ext: No edema in b/l lower extremity. +Chronic R big toe wound w/ associated desquamation of skin, no erythema or drainage      Meds:  ciprofloxacin   IVPB IV Intermittent    aMIOdarone    Tablet Oral  aMIOdarone    Tablet Oral  phenylephrine    Infusion IV Continuous    atorvastatin Oral  dextrose 50% Injectable IV Push  dextrose 50% Injectable IV Push  insulin lispro (ADMELOG) corrective regimen sliding scale SubCutaneous    albuterol/ipratropium for Nebulization Nebulizer  buDESOnide    Inhalation Suspension Inhalation    acetaminophen   Oral Liquid .. Oral PRN      apixaban Oral  aspirin  chewable Enteral Tube    pantoprazole   Suspension Oral      dextrose 10% Bolus IV Bolus  dextrose 5%. IV Continuous  dextrose 5%. IV Continuous    mepolizumab Subcutaneous Injectable SubCutaneous                                13.9   9.75  )-----------( 225      ( 02 May 2024 05:30 )             44.3       05-02    138  |  106  |  17  ----------------------------<  196<H>  4.7   |  27  |  1.50<H>    Ca    9.0      02 May 2024 05:30  Phos  3.2     05-02  Mg     2.0     05-02              Urinalysis Basic - ( 02 May 2024 05:30 )    Color: x / Appearance: x / SG: x / pH: x  Gluc: 196 mg/dL / Ketone: x  / Bili: x / Urobili: x   Blood: x / Protein: x / Nitrite: x   Leuk Esterase: x / RBC: x / WBC x   Sq Epi: x / Non Sq Epi: x / Bacteria: x      Clean Catch Clean Catch (Midstream)   No growth -- 05-01 @ 01:15  .Blood Blood-Peripheral   No growth at 48 Hours -- 04-30 @ 23:50  .Blood Blood-Peripheral   No growth at 48 Hours -- 04-30 @ 23:45              Radiology:    Bedside Ultrasound:    Tubes/Lines: none        GLOBAL ISSUE/BEST PRACTICE:  Analgesia: N  Sedation: N  HOB elevation: Y  Stress ulcer prophylaxis: Y  VTE prophylaxis: Y  Glycemic control: Mod ISS  Nutrition: Y    CODE STATUS: Full Code       INTERVAL HPI/OVERNIGHT EVENTS: No acute overnight events occurred.    SUBJECTIVE: Pulled out his NGT this morning. Still not really improving.     Review of Systems:  Constitutional: No fever, chills, fatigue  Neuro: No headache, numbness, +weakness, +difficulty speaking  Resp: No cough, wheezing, shortness of breath  CVS: No chest pain, palpitations, leg swelling  GI: No abdominal pain, nausea, vomiting, diarrhea   : No dysuria, frequency, incontinence      ICU Vital Signs Last 24 Hrs  T(C): 36.6 (03 May 2024 05:04), Max: 37.3 (02 May 2024 20:03)  T(F): 97.9 (03 May 2024 05:04), Max: 99.2 (02 May 2024 20:03)  HR: 99 (03 May 2024 07:00) (77 - 118)  BP: 128/59 (03 May 2024 07:00) (101/56 - 209/84)  BP(mean): 86 (03 May 2024 07:00) (69 - 129)  ABP: --  ABP(mean): --  RR: 23 (03 May 2024 07:00) (18 - 34)  SpO2: 93% (03 May 2024 07:00) (92% - 100%)    O2 Parameters below as of 03 May 2024 01:06  Patient On (Oxygen Delivery Method): BiPAP/CPAP, 28              05-02-24 @ 07:01  -  05-03-24 @ 07:00  --------------------------------------------------------  IN: 2813.6 mL / OUT: 1650 mL / NET: 1163.7 mL        CAPILLARY BLOOD GLUCOSE      POCT Blood Glucose.: 233 mg/dL (03 May 2024 05:22)      I&O's Summary    02 May 2024 07:01  -  03 May 2024 07:00  --------------------------------------------------------  IN: 2813.6 mL / OUT: 1650 mL / NET: 1163.7 mL        PHYSICAL EXAM:  Gen: Awake, somnolent. Laying in bed comfortably,   Neuro: +R-sided hemiparesis in upper > lower extremities.   +aphasia and dysarthria. PERRLA. RUE 1/5, RLE 0/5, LUE 3/5, LLE 1/5  HEENT: +NGT. NC/AT, +L cataract  Resp: Course breath sounds B/L. No accessory muscle use. No wheezing.   CVS: RRR. +S1/S2. No murmurs  Abd: Soft. NT/ND. +BS  Ext: No edema in b/l lower extremity. +Chronic R big toe wound w/ associated desquamation of skin, no erythema or drainage      Meds:  ciprofloxacin   IVPB IV Intermittent    aMIOdarone    Tablet Oral  aMIOdarone    Tablet Oral  phenylephrine    Infusion IV Continuous    atorvastatin Oral  dextrose 50% Injectable IV Push  dextrose 50% Injectable IV Push  insulin lispro (ADMELOG) corrective regimen sliding scale SubCutaneous    albuterol/ipratropium for Nebulization Nebulizer  buDESOnide    Inhalation Suspension Inhalation    acetaminophen   Oral Liquid .. Oral PRN      apixaban Oral  aspirin  chewable Enteral Tube    pantoprazole   Suspension Oral      dextrose 10% Bolus IV Bolus  dextrose 5%. IV Continuous  dextrose 5%. IV Continuous    mepolizumab Subcutaneous Injectable SubCutaneous                                13.9   9.75  )-----------( 225      ( 02 May 2024 05:30 )             44.3       05-02    138  |  106  |  17  ----------------------------<  196<H>  4.7   |  27  |  1.50<H>    Ca    9.0      02 May 2024 05:30  Phos  3.2     05-02  Mg     2.0     05-02              Urinalysis Basic - ( 02 May 2024 05:30 )    Color: x / Appearance: x / SG: x / pH: x  Gluc: 196 mg/dL / Ketone: x  / Bili: x / Urobili: x   Blood: x / Protein: x / Nitrite: x   Leuk Esterase: x / RBC: x / WBC x   Sq Epi: x / Non Sq Epi: x / Bacteria: x      Clean Catch Clean Catch (Midstream)   No growth -- 05-01 @ 01:15  .Blood Blood-Peripheral   No growth at 48 Hours -- 04-30 @ 23:50  .Blood Blood-Peripheral   No growth at 48 Hours -- 04-30 @ 23:45              Radiology:    Bedside Ultrasound:    Tubes/Lines: none        GLOBAL ISSUE/BEST PRACTICE:  Analgesia: N  Sedation: N  HOB elevation: Y  Stress ulcer prophylaxis: Y  VTE prophylaxis: Y  Glycemic control: Mod ISS  Nutrition: Y    CODE STATUS: Full Code

## 2024-05-03 NOTE — PROGRESS NOTE ADULT - PROBLEM SELECTOR PLAN 7
Chronic  - Continue Phenyephrine gtt to maintain -170 for permissive HTN   - Discontinue lopressor for permissive HTN  - Monitor routine hemodynamics  - Plan per ICU Chronic  - DC phenylephrine  - Discontinue lopressor for permissive HTN  - Monitor routine hemodynamics  - Plan per ICU

## 2024-05-03 NOTE — SWALLOW VFSS/MBS ASSESSMENT ADULT - RECOMMENDED FEEDING/EATING TECHNIQUES
via teaspoon presentations/alternate food with liquid/check mouth frequently for oral residue/pocketing/crush medication (when feasible)/maintain upright posture during/after eating for 30 mins/oral hygiene/position upright (90 degrees)/provide rest periods between swallows/small sips/bites

## 2024-05-03 NOTE — PROGRESS NOTE ADULT - SUBJECTIVE AND OBJECTIVE BOX
Neurology Follow up note    YUE [id number]    HPI:  83yoM PMHx AFib on Eliquis, CAD, HTN, DM, HLD, COPD, osteomyelitis presents c/o shortness of breath, chest discomfort. Pt reports onset of rapid heart rate this morning, HR measured 160s when he checked his pulse ox. Also endorses dyspnea exacerbated by movement. Pt reports last episode of AFib in 2020, no episodes since then until today's presentation. Pt states that chest discomfort feels like chest pressure, no chest pain. Pt also reports chronic R big toe wound for the past few months, follows w/ Wound Care. States that he has increased sensitivity to R sole of foot, but denies pain, numbness/tingling. Denies fevers, chills, abdominal pain, N/V/D/C.     IN THE ED:  Temp 98  F ,  , /81  ,RR 18 , SpO2 94%  S/P PO , IV diltiazem 10mg x2, IV diltiazem gtt @ 10 mg/hr  Labs significant for BUN/Cr 25/1.6, troponin 507.9, pBNP 355  Imaging:   CXR wet read: no gross abnormalities (25 Apr 2024 12:13)      Interval History -no ha/dizziness    Patient is seen, chart was reviewed and case was discussed with the treatment team.  Pt is not in any distress.   Lying on bed comfortably.     .    Vital Signs Last 24 Hrs  T(C): 36.9 (03 May 2024 16:56), Max: 37.3 (02 May 2024 20:03)  T(F): 98.4 (03 May 2024 16:56), Max: 99.2 (02 May 2024 20:03)  HR: 112 (03 May 2024 14:30) (88 - 120)  BP: 143/63 (03 May 2024 14:30) (101/56 - 189/96)  BP(mean): 98 (03 May 2024 14:00) (69 - 129)  RR: 21 (03 May 2024 14:30) (21 - 32)  SpO2: 94% (03 May 2024 14:30) (93% - 100%)    Parameters below as of 03 May 2024 07:44  Patient On (Oxygen Delivery Method): room air                            REVIEW OF SYSTEMS:    Constitutional: No fever, weight loss or fatigue  Eyes: No eye pain, visual disturbances, or discharge  ENT:  No difficulty hearing, tinnitus, vertigo; No sinus or throat pain  Neck: No pain or stiffness  Respiratory: No wheezing, chills or hemoptysis  Cardiovascular: No chest pain, palpitations, shortness of breath, dizziness  Gastrointestinal: No abdominal or epigastric pain. No nausea, vomiting or hematemesis  Genitourinary: No dysuria, frequency, hematuria or incontinence  Neurological: No headaches, numbness or tremors  Psychiatric: No depression, anxiety, mood swings or difficulty sleeping  Musculoskeletal: No joint pain or swelling; No muscle, back or extremity pain  Skin: No itching, burning, rashes or lesions   Lymph Nodes: No enlarged glands  Endocrine: No heat or cold intolerance; No hair loss,   Allergy and Immunologic: No hives or eczema    On Neurological Examination:    Mental Status - Pt is alert, awake, oriented X3.. Follows commands well and able to answer questions appropriately.Mood and affect  normal    Speech -  MIXED APHASIA    Cranial Nerves - Pupils 3 mm equal and reactive to light, extraocular eye movements intact. Pt has no visual field deficit.  Pt has right facial weakness  . Facial sensation is intact.Tongue - is in midline.    Muscle tone - is decreased on right  .      Motor Exam -right hemiparesis; RUE 1/5; LE 1-2/5   No shaking or tremors.    Sensory Exam -. Pt withdraws all extremities equally on stimulation. No asymmetry seen. No complaints of tingling, numbness.        coordination:    Finger to nose: normal-left        Deep tendon Reflexes - 2 plus all over.            Neck Supple -  Yes.     MEDICATIONS  (STANDING):  albuterol/ipratropium for Nebulization 3 milliLiter(s) Nebulizer every 6 hours  aMIOdarone    Tablet 400 milliGRAM(s) Oral every 8 hours  aMIOdarone    Tablet 400  Oral   apixaban 2.5 milliGRAM(s) Oral every 12 hours  aspirin  chewable 81 milliGRAM(s) Enteral Tube daily  atorvastatin 80 milliGRAM(s) Oral at bedtime  buDESOnide    Inhalation Suspension 0.5 milliGRAM(s) Inhalation two times a day  ciprofloxacin   IVPB 400 milliGRAM(s) IV Intermittent every 12 hours  insulin lispro (ADMELOG) corrective regimen sliding scale   SubCutaneous every 6 hours  midodrine 10 milliGRAM(s) Oral every 8 hours    MEDICATIONS  (PRN):  acetaminophen   Oral Liquid .. 650 milliGRAM(s) Oral every 6 hours PRN Temp greater or equal to 38.5C (101.3F)          Allergies    No Known Allergies    Intolerances               05-03    142  |  107  |  23  ----------------------------<  225<H>  3.9   |  26  |  1.70<H>    Ca    9.4      03 May 2024 05:15  Phos  2.6     05-03  Mg     2.0     05-03        Hemoglobin A1C:     Vitamin B12     RADIOLOGY    ASSESSMENT AND PLAN:      Seen for right sided weakness/ams  consistent subacute left mca infarct  also multiple punctate subacute cerebellar infarcts  most likely cardio-embolic    ON AC  no need for permissive HTN   management dw critical care team  Physical therapy evaluation.  Speech/swallowing eval in progress  Pain is accessed and addressed.  Plan of care was discussed with family(wife_. Questions answered.  Would continue to follow.

## 2024-05-03 NOTE — PROGRESS NOTE ADULT - PROBLEM SELECTOR PLAN 2
History of Afib, on home Metoprolol BID and Eliquis   - TTE 4/26/2024: LV no well visualized however LV probably normal based on limited views, moderate thickening of aortic valve leaflets, trace TR, dilated IVC with normal inspiratory collapse, normal pulmonary artery pressure  - NG tube placed for meds and feeds  - Consider resumption of therapeutic anticoagulation pending neurology's recommendations  - Hold home BB to allow for permissive HTN   - Transition to amio via NG tube  - Telemetry monitoring, electrolyte monitoring  - Plan per ICU History of Afib, on home Metoprolol BID and Eliquis   - TTE 4/26/2024: LV no well visualized however LV probably normal based on limited views, moderate thickening of aortic valve leaflets, trace TR, dilated IVC with normal inspiratory collapse, normal pulmonary artery pressure  - Resumed AC  - Hold home BB to allow for permissive HTN   - po amio  - Telemetry monitoring, electrolyte monitoring  - Plan per ICU

## 2024-05-03 NOTE — PROGRESS NOTE ADULT - ASSESSMENT
83 year old male with a PMH of eosinophilic asthma/COPD, former smoker, chronic hypoxemic and hypercapnic respiratory failure on nocturnal BiPAP, afib on Eliquis, CAD s/p CABG and PCI, HTN, HLD, DM2, L femur fracture with chronic OM who presented to the ED from home with complaints of SOB and tachycardia.      Neuro:   - s/p L MCA ischemic stroke w/ b/l embolic infarcts with no hemorrhagic conversion  - Repeat CTH w/ redemonstration of multipile evolving infarcts  - BP goal lowered to 140-160 SBP, c/w phenylephrine   - c/w ASA 81mg PO, c/w statin  - c/w PT/OT, plan for MBS     Cardio:  - C/w PO Amio  - c/w eliquis for afib   - BP goal lowered to 140-160 SBP, c/w phenylephrine   - BB on hold for now to allow for permissive HTN    Pulm:   - Hx COPD, COURTNEY, c/w nocturnal BiPAP   - Standing duonebs, budesonide  - Will receive home nucala injection tomorrow    GI:  - +NGT, c/w tube feeds  - Plan for MBS today  - c/w protonix for stress ulcer ppx    Renal:   - No acute needs    ID:   - BCx NGTD, UCx Negative  - Will monitor off IB abx for now  - c/w home cipro for suppression of chronic osteomyelitis  - Local wound care for chronic R big toe wound     Endocrine:   - LDISS    Heme:   - LUE duplex negative for DVT  - c/w HSQ q8h per neuro recs 2/2 hemorrhagic conversion risk with full AC  - C/w Eliquis 2.5mg BID based off age and CrCl    #GOC: Full Code 83 year old male with a PMH of eosinophilic asthma/COPD, former smoker, chronic hypoxemic and hypercapnic respiratory failure on nocturnal BiPAP, afib on Eliquis, CAD s/p CABG and PCI, HTN, HLD, DM2, L femur fracture with chronic OM who presented to the ED from home with complaints of SOB and tachycardia.      Neuro:   - s/p L MCA ischemic stroke w/ b/l embolic infarcts with no hemorrhagic conversion  - Repeat CTH w/ redemonstration of multipile evolving infarcts  - BP goal lowered to 140-160 SBP, c/w phenylephrine   - c/w ASA 81mg PO, c/w statin  - c/w PT/OT, plan for MBS     Cardio:  - C/w PO Amio  - c/w eliquis for afib   - BP goal lowered to 140-160 SBP, c/w phenylephrine   - BB on hold for now to allow for permissive HTN    Pulm:   - Hx COPD, COURTNEY, c/w nocturnal BiPAP   - Standing duonebs, budesonide  - Will receive home nucala injection today    GI:  - +NGT, c/w tube feeds  - Plan for MBS today  - c/w protonix for stress ulcer ppx    Renal:   - No acute needs    ID:   - BCx NGTD, UCx Negative  - Will monitor off IB abx for now  - c/w home cipro for suppression of chronic osteomyelitis  - Local wound care for chronic R big toe wound     Endocrine:   - LDISS switched to modISS    Heme:   - LUE duplex negative for DVT  - c/w HSQ q8h per neuro recs 2/2 hemorrhagic conversion risk with full AC  - C/w Eliquis 2.5mg BID based off age and CrCl    #GOC: Full Code 83 year old male with a PMH of eosinophilic asthma/COPD, former smoker, chronic hypoxemic and hypercapnic respiratory failure on nocturnal BiPAP, afib on Eliquis, CAD s/p CABG and PCI, HTN, HLD, DM2, L femur fracture with chronic OM who presented to the ED from home with complaints of SOB and tachycardia.      Neuro:   - s/p L MCA ischemic stroke w/ b/l embolic infarcts with no hemorrhagic conversion  - Repeat CTH w/ redemonstration of multipile evolving infarcts  - BP goal lowered to 140-160 SBP, c/w phenylephrine   - c/w ASA 81mg PO, c/w statin  - c/w PT/OT, plan for MBS     Cardio:  - C/w PO Amio  - c/w eliquis for afib   - BP goal lowered to 140-160 SBP, c/w phenylephrine   - BB on hold for now to allow for permissive HTN    Pulm:   - Hx COPD, COURTNEY, c/w nocturnal BiPAP   - Standing duonebs, budesonide  - Will receive home nucala injection today    GI:  - ngt removed  - Plan for MBS today  - c/w protonix for stress ulcer ppx    Renal:   - No acute needs    ID:   - BCx NGTD, UCx Negative  - Will monitor off IB abx for now  - c/w home cipro for suppression of chronic osteomyelitis  - Local wound care for chronic R big toe wound     Endocrine:   - LDISS switched to modISS    Heme:   - LUE duplex negative for DVT  - c/w HSQ q8h per neuro recs 2/2 hemorrhagic conversion risk with full AC  - C/w Eliquis 2.5mg BID based off age and CrCl    #GOC: Full Code

## 2024-05-03 NOTE — PROGRESS NOTE ADULT - SUBJECTIVE AND OBJECTIVE BOX
[facility] -- Saud Aguilar, Génesis Louise, Carroll Haney Savella  Office # [phone]      Follow Up:  AF, hypotension    Subjective/Observations: Patient seen and examined. Events noted. Resting  in bed. Unable to provide meaningful information. On yolande gtt       REVIEW OF SYSTEMS: Limited 2/2 comorbidities     PAST MEDICAL & SURGICAL HISTORY:  Diabetes Mellitus, Type II      CAD (Coronary Artery Disease)  s/p 3v CABG 2004; stents placed in [place] in 2019      Dyslipidemia      Osteomyelitis      COPD (chronic obstructive pulmonary disease)  on 2L at home and BiPAP at night; intubated 6/18      Hypertension      PVD (peripheral vascular disease)      History of PAT (paroxysmal atrial tachycardia)      Asthma with COPD      BPH (benign prostatic hyperplasia)      Acute osteomyelitis      CABG (Coronary Artery Bypass Graft)  2004      Compound fracture  left leg      S/P primary angioplasty with coronary stent      H/O drainage of abscess  Left femur 12/2021          MEDICATIONS  (STANDING):  albuterol/ipratropium for Nebulization 3 milliLiter(s) Nebulizer every 6 hours  aMIOdarone    Tablet 400 milliGRAM(s) Oral every 8 hours  aMIOdarone    Tablet 400  Oral   apixaban 2.5 milliGRAM(s) Oral every 12 hours  aspirin  chewable 81 milliGRAM(s) Enteral Tube daily  atorvastatin 80 milliGRAM(s) Oral at bedtime  buDESOnide    Inhalation Suspension 0.5 milliGRAM(s) Inhalation two times a day  ciprofloxacin   IVPB 400 milliGRAM(s) IV Intermittent every 12 hours  insulin lispro (ADMELOG) corrective regimen sliding scale   SubCutaneous every 6 hours  pantoprazole   Suspension 40 milliGRAM(s) Oral daily    MEDICATIONS  (PRN):  acetaminophen   Oral Liquid .. 650 milliGRAM(s) Oral every 6 hours PRN Temp greater or equal to 38.5C (101.3F)      Allergies    No Known Allergies    Intolerances            Vital Signs Last 24 Hrs  T(C): 37.1 (03 May 2024 07:58), Max: 37.3 (02 May 2024 20:03)  T(F): 98.7 (03 May 2024 07:58), Max: 99.2 (02 May 2024 20:03)  HR: 100 (03 May 2024 10:00) (77 - 118)  BP: 156/74 (03 May 2024 10:00) (101/56 - 190/90)  BP(mean): 106 (03 May 2024 10:00) (69 - 129)  RR: 25 (03 May 2024 10:00) (18 - 34)  SpO2: 96% (03 May 2024 10:00) (92% - 100%)    Parameters below as of 03 May 2024 07:44  Patient On (Oxygen Delivery Method): room air        I&O's Summary    02 May 2024 07:01  -  03 May 2024 07:00  --------------------------------------------------------  IN: 2813.6 mL / OUT: 1650 mL / NET: 1163.7 mL          PHYSICAL EXAM:  TELE:   Constitutional: NAD, awake and alert, well-developed  HEENT: Moist Mucous Membranes, Anicteric  Pulmonary: Non-labored, breath sounds are clear bilaterally, No wheezing, rales or rhonchi  Cardiovascular: Regular, S1 and S2, No murmurs, rubs, gallops or clicks  Gastrointestinal: Bowel Sounds present, soft, nontender.   Lymph: No peripheral edema. No lymphadenopathy.  Skin: No visible rashes or ulcers.  Psych:  Mood & affect appropriate for situation    LABS: All Labs Reviewed:                        12.6   12.12 )-----------( 227      ( 03 May 2024 05:15 )             40.5                         13.9   9.75  )-----------( 225      ( 02 May 2024 05:30 )             44.3                         13.5   9.57  )-----------( 216      ( 01 May 2024 05:29 )             42.0     03 May 2024 05:15    142    |  107    |  23     ----------------------------<  225    3.9     |  26     |  1.70   02 May 2024 05:30    138    |  106    |  17     ----------------------------<  196    4.7     |  27     |  1.50   01 May 2024 05:29    138    |  104    |  16     ----------------------------<  223    4.2     |  27     |  1.50     Ca    9.4        03 May 2024 05:15  Ca    9.0        02 May 2024 05:30  Ca    9.2        01 May 2024 05:29  Phos  2.6       03 May 2024 05:15  Phos  3.2       02 May 2024 05:30  Phos  3.3       01 May 2024 05:29  Mg     2.0       03 May 2024 05:15  Mg     2.0       02 May 2024 05:30  Mg     1.8       01 May 2024 05:29          - Troponin: <-617.0, <-733.5

## 2024-05-03 NOTE — PROGRESS NOTE ADULT - ASSESSMENT
83-year-old  black male with history of COPD coronary artery disease hypertension diabetes hyperlipidemia atrial fibrillation on Eliquis as well as osteomyelitis who presents now to the emergency department at [facility] complaining of rapid heart rate    jacy  copd  AF  OP  OA  CAD  HTN  DM  HLD  OM hx  CVA     on AMIO  on Nucala for Asthma - as per home rx regimen  CVA tx in progress - CT head repeat noted -     follows with Dr Ryland ashley med  uses NIV - BIPAP night time for JACY - sleep hygiene reviewed  cardio eval - [facility]vs rx regimen  BP control  DM care  AF rate and rhythm control  TFT  outpatient record reviewed  proventil PRN - Singulair - copd  spoke with WIFE  on emp ABX   wound care

## 2024-05-03 NOTE — PROGRESS NOTE ADULT - PROBLEM SELECTOR PLAN 1
- MRI Head: Multiple acute infarcts scattered throughout the bilateral cerebral and cerebellar hemispheres. More dominant acute to subacute infarction in the posterior left MCA vascular distribution. Chronic small vessel disease.  - No evidence of hemorrhagic conversion -repeat head CT from May 1 with no acute findings  -Resume Eliquis  - Maintains on a phenylephrine infusion to maintain MAP  - Continue aspirin suppository  - Continue phenylephrine gtt to maintain -170 for permissive HTN   - Neuro checks q4h  - The patient remains n.p.o.  - NG tube placed for feeds  - Plan for possible MBS once otherwise clinically appropriate  - Fall and aspiration precautions   - Plan per ICU - MRI Head: Multiple acute infarcts scattered throughout the bilateral cerebral and cerebellar hemispheres. More dominant acute to subacute infarction in the posterior left MCA vascular distribution. Chronic small vessel disease.  - No evidence of hemorrhagic conversion -repeat head CT from May 1 with no acute findings  - Resume Eliquis  - Weaned off phenylephrine  - Continue aspirin suppository  - Neuro checks per protocol  - Diet was advanced but then reverted back to NPO due to excess secretions and poor tolerance of po diet  - s/p MBS  - Fall and aspiration precautions   - Plan per ICU - likely plan for downgrade now off vasopressors

## 2024-05-03 NOTE — PROGRESS NOTE ADULT - PROBLEM SELECTOR PLAN 8
Chronic, on home insulin (Lantus 10u)  - NPO   - hold home insulin  - start low dose sliding scale  - hypoglycemia protocol in place, fingersticks q ac, q hs   - goal -180 in hospital setting, adjust insulin regimen prn  - HbA1c 6.8  - Plan per ICU Chronic, on home insulin (Lantus 10u)  - NPO   - hold home insulin  - low dose sliding scale  - hypoglycemia protocol in place, fingersticks q ac, q hs   - goal -180 in hospital setting, adjust insulin regimen prn  - HbA1c 6.8  - Plan per ICU

## 2024-05-03 NOTE — PROGRESS NOTE ADULT - ATTENDING COMMENTS
84 yo man with Hx eosinophilic asthma/COPD, chronic hypoxemic and hypercapnic respiratory failure, afib on eliquis, CAD, DM2, L femur Fx with chronic osteo, admitted with uncontrolled afib, course complicated by acute L MCA CVA resulting in R hemiparesis and dysarthria.  Not a TNK candidate as he was on full AC.    Neuro: posterior L MCA CVA with multiple smaller acute b/l cerebral and cerebellar strokes. Continue eliquis and statin (did not get this morning's dose because pulled out NGT, will re-assess for need of replacement after MBS). Completed induced HTN with phenylephrine SBP. Goal normal. PT/OT/speech/swallow evals  CV: afib controlled on amio  Respiratory: asthma, continue inhaled steroids, bronchodilators. S/p nucala injection 5/3 (wife brought in). Chronic hypercapnic respiratory failure, continue BiPAP at night or with sleep  Renal: CKD stable  GI: dysphagia. MBS today. Pulled out NGT. Will replace if fails MBS.  ID: chronic LE osteo, continue cipro. Episode of fever, normal WBC, monitor off Abx for now and f/u Cx  Endo: DM2. Mildly hyperglycemic on low dose ISS. Increase to medium dose.   Ethics: Full code.    Patient no longer requires ICU level care. Will downgrade to remote telemetry. 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  Not a TNK candidate as he was on full AC.    Neuro: posterior L MCA CVA with multiple smaller acute b/l cerebral and cerebellar strokes. Continue eliquis and statin (did not get this morning's dose because pulled out NGT, will re-assess for need of replacement after MBS). Completed induced HTN with phenylephrine SBP. Goal normal. PT/OT/speech/swallow evals  CV: afib controlled on amio  Respiratory: asthma, continue inhaled steroids, bronchodilators. S/p nucala injection 5/3 (wife brought in). Chronic hypercapnic respiratory failure, continue BiPAP at night or with sleep  Renal: CKD stable  GI: dysphagia. MBS today. Pulled out NGT. Will replace if fails MBS.  ID: chronic LE osteo, continue cipro. Episode of fever, normal WBC, monitor off Abx for now and f/u Cx  Endo: DM2. Mildly hyperglycemic on low dose ISS. Increase to medium dose.   Ethics: Full code.  Discussed with wife at bedside.     Patient no longer requires ICU level care. Will downgrade to remote telemetry.

## 2024-05-03 NOTE — PROGRESS NOTE ADULT - PROBLEM SELECTOR PLAN 3
Elevated trop on admission likely 2/2 Afib w/ RVR   - TTE 4/26/2024: technically difficult study, LV not well visualized however LV probably normal based on limited views, moderate thickening of aortic valve leaflets, trace TR, dilated IVC with normal inspiratory collapse, normal pulmonary artery pressure  - Trop x2 both sets elevated but downtrended   - No complaints of chest pain, low suspicions of ACS --> monitor for signs and symptoms of ischemia   - Cardio following  - Plan per ICU

## 2024-05-03 NOTE — SWALLOW VFSS/MBS ASSESSMENT ADULT - ADDITIONAL RECOMMENDATIONS
1) Patient will benefit from swallow therapy pending discharge (i.e., homecare vs rehab vs [facility]). 2) This service to follow up as schedule permits for dysphagia management while patient is admitted. 3) Monitor for PO intake and tolerance. 4) Please reconsult this service as needed.

## 2024-05-03 NOTE — PROGRESS NOTE ADULT - PROBLEM SELECTOR PLAN 4
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  - R hallux dressing c/d/i  - Continue Cipro    - Continue local wound care   - ID following   - Podiatry following  - Plan per ICU

## 2024-05-03 NOTE — PROGRESS NOTE ADULT - TIME BILLING
Emotional support and counseling provided. Emotional support and counseling provided.  Updated the patient's spouse at bedside.   Spoke w/ ICU team about the med regimen and hemodynamics.

## 2024-05-03 NOTE — SWALLOW VFSS/MBS ASSESSMENT ADULT - COMMENTS
Critical Care note 5/3 "83 year old male with a PMH of eosinophilic asthma/COPD, former smoker, chronic hypoxemic and hypercapnic respiratory failure on nocturnal BiPAP, afib on Eliquis, CAD s/p CABG and PCI, HTN, HLD, DM2, L femur fracture with chronic OM who presented to the ED from home with complaints of SOB and tachycardia."    Pt seen on 4/29 for swallow evaluation with recommendations for "NPO as PO is contraindicated at this time" (see report for details).   ?  Patient seen seated upright at Radiology suite for MBS today, at which time patient was awake/alert. Patient able to follow simple one-step directions given verbal cues. Pt with reduced speech intelligibility.

## 2024-05-03 NOTE — PROGRESS NOTE ADULT - PROBLEM SELECTOR PLAN 5
MERRILL on admission. Baseline Cr around 1.1  - On admission Cr 1.6   - Feed via NGT  - Avoid nephrotoxics - hold home furosemide for now  - Renal indices have been stable MERRILL on admission. Baseline Cr around 1.1  - On admission Cr 1.6   - Monitor volume status closely  - Avoid nephrotoxics - hold home furosemide for now  - Renal indices have been stable

## 2024-05-03 NOTE — PROGRESS NOTE ADULT - ASSESSMENT
83-year-old M with history of COPD on home O2 PRN, coronary artery disease s/p CABG,  hypertension, diabetes, hyperlipidemia, atrial fibrillation on Eliquis as well as chronic osteomyelitis who presents to the emergency department at [facility] complaining of rapid heart rate. Cardiology consulted for afib with rvr.    - Presenting with PAF with RVR  - At home he had noted some SOB, his wife checked his HR and noted it up was up to 160s so brought him to the ER, then placed on Cardizem gtt, had missed home BB   - on the morning of 4/27, patient was noted to have new onset aphasia and a code stroke was called.  He was deemed not a candidate for TNK as he is on AC.  He was found to have a L M3 occlusion but was deemed not a candidate for thrombectomy as occlusion too distal.  Later that night, a RRT was called due to worsening NIH score noted by nursing.  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   - Pressors as per Neuro for systolic BP goal. on deescalation of pressors had dropped his MAP too low. Neftali gtt resumed an uptitrated.   - back in NSR. Currently on PO Amiodarone loading.   - Now back on Eliquis 2.5mg BID. Dose reduced in the setting of renal function and age. Previous Cr of 1.4, if his renal function goes back to baseline of 1.4 would place on full dose Eliquis for CVA protection  - BB on hold for permissive hypertension, though can restart when neuro ok with it.  - cont ASA as per Neuro  - cont high intensity statin  - Continue telemetry  - Monitor and replete electrolytes. Keep K>4.0 and Mg>2.0.

## 2024-05-03 NOTE — SWALLOW VFSS/MBS ASSESSMENT ADULT - DIAGNOSTIC IMPRESSIONS
1) Moderate oral dysphagia for puree, moderately thick, mildly thick, and thin liquids marked by reduced utensil stripping, and anterior loss for thin liquids 2/2 reduced labial seal/closure. Pt with prolonged manipulation, and bolus holding (~25-30 sec). Pt with slow/repetitive lingual movements resulting in delayed anterior to posterior bolus transport/transit. There is premature spillage to the hypopharynx due to reduced tongue to palate seal greater for Liquids. Patient with mild to moderate oral clearance deficits located on the lingual surface with partial clearance noted with re-swallows.  2) Mild Pharyngeal Stage for puree, moderately thick, mildly thick, and thin liquids characterized by delayed initiation of the pharyngeal swallow, reduced laryngeal elevation, adequate laryngeal vestibule closure, reduced base of tongue retraction, reduced epiglottic deflection, reduced pharyngeal contractility. There is Mild Pharyngeal Clearance deficits at the level of the vallecular and pyriforms post primary swallow. Spontaneous re-swallow partially assists pharyngeal clearance. No Penetration or Aspiration noted across consistencies.

## 2024-05-03 NOTE — PROGRESS NOTE ADULT - SUBJECTIVE AND OBJECTIVE BOX
Date/Time Patient Seen:  		  Referring MD:   Data Reviewed	       Patient is a 83y old  Male who presents with a chief complaint of AFib w/ RVR (02 May 2024 17:13)      Subjective/HPI     PAST MEDICAL & SURGICAL HISTORY:  Diabetes Mellitus, Type II    CAD (Coronary Artery Disease)  s/p 3v CABG 2004; stents placed in [place] in 2019    Dyslipidemia    Asymptomatic Peripheral Vascular Disease    Osteomyelitis    COPD (chronic obstructive pulmonary disease)  on 2L at home and BiPAP at night; intubated 6/18    Diabetes mellitus    Hypertension    PVD (peripheral vascular disease)    History of PAT (paroxysmal atrial tachycardia)    Asthma with COPD    BPH (benign prostatic hyperplasia)    Acute osteomyelitis    CABG (Coronary Artery Bypass Graft)  2004    Compound fracture  left leg    S/P primary angioplasty with coronary stent    H/O drainage of abscess  Left femur 12/2021          Medication list         MEDICATIONS  (STANDING):  albuterol/ipratropium for Nebulization 3 milliLiter(s) Nebulizer every 6 hours  aMIOdarone    Tablet 400  Oral   aMIOdarone    Tablet 400 milliGRAM(s) Oral every 8 hours  apixaban 2.5 milliGRAM(s) Oral every 12 hours  aspirin  chewable 81 milliGRAM(s) Enteral Tube daily  atorvastatin 80 milliGRAM(s) Oral at bedtime  buDESOnide    Inhalation Suspension 0.5 milliGRAM(s) Inhalation two times a day  ciprofloxacin   IVPB 400 milliGRAM(s) IV Intermittent every 12 hours  dextrose 10% Bolus 125 milliLiter(s) IV Bolus once  dextrose 5%. 1000 milliLiter(s) (100 mL/Hr) IV Continuous <Continuous>  dextrose 5%. 1000 milliLiter(s) (50 mL/Hr) IV Continuous <Continuous>  dextrose 50% Injectable 12.5 Gram(s) IV Push once  dextrose 50% Injectable 25 Gram(s) IV Push once  insulin lispro (ADMELOG) corrective regimen sliding scale   SubCutaneous every 6 hours  mepolizumab Subcutaneous Injectable 100 milliGRAM(s) SubCutaneous once  pantoprazole   Suspension 40 milliGRAM(s) Oral daily  phenylephrine    Infusion 0.2 MICROgram(s)/kG/Min (7.58 mL/Hr) IV Continuous <Continuous>    MEDICATIONS  (PRN):  acetaminophen   Oral Liquid .. 650 milliGRAM(s) Oral every 6 hours PRN Temp greater or equal to 38.5C (101.3F)         Vitals log        ICU Vital Signs Last 24 Hrs  T(C): 36.6 (03 May 2024 05:04), Max: 37.3 (02 May 2024 20:03)  T(F): 97.9 (03 May 2024 05:04), Max: 99.2 (02 May 2024 20:03)  HR: 96 (03 May 2024 05:30) (77 - 118)  BP: 129/59 (03 May 2024 05:30) (101/56 - 209/84)  BP(mean): 85 (03 May 2024 05:30) (69 - 129)  ABP: --  ABP(mean): --  RR: 21 (03 May 2024 05:30) (18 - 34)  SpO2: 94% (03 May 2024 05:30) (92% - 100%)    O2 Parameters below as of 03 May 2024 01:06  Patient On (Oxygen Delivery Method): BiPAP/CPAP, 28                 Input and Output:  I&O's Detail    01 May 2024 07:01  -  02 May 2024 07:00  --------------------------------------------------------  IN:    Amiodarone: 66.8 mL    Free Water: 300 mL    Glucerna 1.5: 130 mL    IV PiggyBack: 400 mL    Phenylephrine: 1212.4 mL  Total IN: 2109.2 mL    OUT:    Voided (mL): 800 mL  Total OUT: 800 mL    Total NET: 1309.2 mL      02 May 2024 07:01  -  03 May 2024 05:45  --------------------------------------------------------  IN:    Free Water: 1020 mL    Glucerna 1.5: 405 mL    IV PiggyBack: 200 mL    Phenylephrine: 291.6 mL    Phenylephrine: 753.1 mL  Total IN: 2669.6 mL    OUT:    Incontinent per Condom Catheter (mL): 1650 mL  Total OUT: 1650 mL    Total NET: 1019.7 mL          Lab Data                        13.9   9.75  )-----------( 225      ( 02 May 2024 05:30 )             44.3     05-02    138  |  106  |  17  ----------------------------<  196<H>  4.7   |  27  |  1.50<H>    Ca    9.0      02 May 2024 05:30  Phos  3.2     05-02  Mg     2.0     05-02              Review of Systems	      Objective     Physical Examination    heart s1s2  lung dec BS  head nc      Pertinent Lab findings & Imaging      Eliecer:  NO   Adequate UO     I&O's Detail    01 May 2024 07:01  -  02 May 2024 07:00  --------------------------------------------------------  IN:    Amiodarone: 66.8 mL    Free Water: 300 mL    Glucerna 1.5: 130 mL    IV PiggyBack: 400 mL    Phenylephrine: 1212.4 mL  Total IN: 2109.2 mL    OUT:    Voided (mL): 800 mL  Total OUT: 800 mL    Total NET: 1309.2 mL      02 May 2024 07:01  -  03 May 2024 05:45  --------------------------------------------------------  IN:    Free Water: 1020 mL    Glucerna 1.5: 405 mL    IV PiggyBack: 200 mL    Phenylephrine: 291.6 mL    Phenylephrine: 753.1 mL  Total IN: 2669.6 mL    OUT:    Incontinent per Condom Catheter (mL): 1650 mL  Total OUT: 1650 mL    Total NET: 1019.7 mL               Discussed with:     Cultures:	        Radiology

## 2024-05-03 NOTE — PROGRESS NOTE ADULT - SUBJECTIVE AND OBJECTIVE BOX
Name: YUE COTTO  MRN: [id number]  LOCATION: [facility]    ----  Patient is a 83y old  Male who presents with a chief complaint of AFib w/ RVR (03 May 2024 12:05)      FROM ADMISSION H+P:   HPI:  83yoM PMHx AFib on Eliquis, CAD, HTN, DM, HLD, COPD, osteomyelitis presents c/o shortness of breath, chest discomfort. Pt reports onset of rapid heart rate this morning, HR measured 160s when he checked his pulse ox. Also endorses dyspnea exacerbated by movement.     ----  INTERVAL HPI/OVERNIGHT EVENTS: Pt seen and evaluated at the bedside. No acute overnight events occurred.  The patient is not able to provide reliable history at this time due to word finding difficulties.  The patient was able to pass his bedside evaluation and he also self DC'd his NG tube earlier this morning.  The patient was weaned off of phenylephrine.    ----  PAST MEDICAL & SURGICAL HISTORY:  Diabetes Mellitus, Type II      CAD (Coronary Artery Disease)  s/p 3v CABG ; stents placed in [place] in       Dyslipidemia      Osteomyelitis      COPD (chronic obstructive pulmonary disease)  on 2L at home and BiPAP at night; intubated       Hypertension      PVD (peripheral vascular disease)      History of PAT (paroxysmal atrial tachycardia)      Asthma with COPD      BPH (benign prostatic hyperplasia)      Acute osteomyelitis      CABG (Coronary Artery Bypass Graft)        Compound fracture  left leg      S/P primary angioplasty with coronary stent      H/O drainage of abscess  Left femur 2021          FAMILY HISTORY:  Family history of diabetes mellitus (Sibling)    Family hx of lung cancer  brother,  age 82, used to smoke with pt        Allergies    No Known Allergies    Intolerances        ----  REVIEW OF SYSTEMS:  Unable to obtain comprehensive reliable review of systems due to the patient's word finding difficulties but generally the patient denies any active complaints    ----  PHYSICAL EXAM:  GENERAL: patient appears advanced age, nontoxic-appearing, on room air  ENMT: oropharynx clear without erythema, dry mucous membranes, facial asymmetry noted  LUNGS: reduced air entry, no wheezing, coarse breath sounds throughout  HEART: S1/S2, regular rate and regular rhythm, distant heart sounds  GASTROINTESTINAL: abdomen is soft, nontender, nondistended, normoactive bowel sounds, no palpable masses  MUSCULOSKELETAL: no clubbing or cyanosis, no obvious deformity  NEUROLOGIC: awake, alert, readily interactive, right-sided weakness, word finding difficulties, unable to articulate some words clearly    T(C): 37.1 (24 @ 12:22), Max: 37.3 (24 @ 20:03)  HR: 109 (24 @ 12:30) (78 - 118)  BP: 141/79 (24 @ 12:30) (101/56 - 190/90)  RR: 29 (24 @ 12:30) (19 - 34)  SpO2: 94% (24 @ 12:30) (92% - 100%)  Wt(kg): --    ----  INTAKE & OUTPUT:  I&O's Summary    02 May 2024 07:01  -  03 May 2024 07:00  --------------------------------------------------------  IN: 2813.6 mL / OUT: 1650 mL / NET: 1163.7 mL        LABS:                        12.6   12.12 )-----------( 227      ( 03 May 2024 05:15 )             40.5     05-03    142  |  107  |  23  ----------------------------<  225<H>  3.9   |  26  |  1.70<H>    Ca    9.4      03 May 2024 05:15  Phos  2.6     05-03  Mg     2.0     05-03        Urinalysis Basic - ( 03 May 2024 05:15 )    Color: x / Appearance: x / SG: x / pH: x  Gluc: 225 mg/dL / Ketone: x  / Bili: x / Urobili: x   Blood: x / Protein: x / Nitrite: x   Leuk Esterase: x / RBC: x / WBC x   Sq Epi: x / Non Sq Epi: x / Bacteria: x      CAPILLARY BLOOD GLUCOSE      POCT Blood Glucose.: 222 mg/dL (03 May 2024 12:19)  POCT Blood Glucose.: 233 mg/dL (03 May 2024 05:22)  POCT Blood Glucose.: 242 mg/dL (02 May 2024 22:59)  POCT Blood Glucose.: 205 mg/dL (02 May 2024 17:52)        Culture - Urine (collected 24 @ 01:15)  Source: Clean Catch Clean Catch (Midstream)  Final Report (24 @ 12:08):    No growth    Culture - Blood (collected 24 @ 23:50)  Source: .Blood Blood-Peripheral  Preliminary Report (24 @ 05:01):    No growth at 48 Hours    Culture - Blood (collected 24 @ 23:45)  Source: .Blood Blood-Peripheral  Preliminary Report (24 @ 05:01):    No growth at 48 Hours        ----  DATA REVIEW:  Personally reviewed:  -Vital sign trends: Afebrile. Heart rate is labile. Tachycardic during my assessment.   -Laboratory results:    -Radiology results:    -Microbio results:    -Consultant recommendations:            ----  ACTIVE MEDICATIONS (by system):  - CV - aMIOdarone    Tablet 400 milliGRAM(s) Oral every 8 hours  - CV - apixaban 2.5 milliGRAM(s) Oral every 12 hours  - CV - aspirin  chewable 81 milliGRAM(s) Enteral Tube daily  - CV - atorvastatin 80 milliGRAM(s) Oral at bedtime  - CV - midodrine 10 milliGRAM(s) Oral every 8 hours  - ENDO - insulin lispro (ADMELOG) corrective regimen sliding scale   SubCutaneous every 6 hours  - GI - pantoprazole   Suspension 40 milliGRAM(s) Oral daily  - ID - ciprofloxacin   IVPB 400 milliGRAM(s) IV Intermittent every 12 hours  - MSK - acetaminophen   Oral Liquid .. 650 milliGRAM(s) Oral every 6 hours PRN  - PULM - albuterol/ipratropium for Nebulization 3 milliLiter(s) Nebulizer every 6 hours  - PULM - buDESOnide    Inhalation Suspension 0.5 milliGRAM(s) Inhalation two times a day       Name: YUE COTTO  MRN: [id number]  LOCATION: [facility]    ----  Patient is a 83y old  Male who presents with a chief complaint of AFib w/ RVR (03 May 2024 12:05)      FROM ADMISSION H+P:   HPI:  83yoM PMHx AFib on Eliquis, CAD, HTN, DM, HLD, COPD, osteomyelitis presents c/o shortness of breath, chest discomfort. Pt reports onset of rapid heart rate this morning, HR measured 160s when he checked his pulse ox. Also endorses dyspnea exacerbated by movement.     ----  INTERVAL HPI/OVERNIGHT EVENTS: Pt seen and evaluated at the bedside. No acute overnight events occurred.  The patient is not able to provide reliable history at this time due to word finding difficulties.  The patient was able to pass his bedside evaluation and he also self DC'd his NG tube earlier this morning.  The patient was weaned off of phenylephrine.    ----  PAST MEDICAL & SURGICAL HISTORY:  Diabetes Mellitus, Type II      CAD (Coronary Artery Disease)  s/p 3v CABG ; stents placed in [place] in       Dyslipidemia      Osteomyelitis      COPD (chronic obstructive pulmonary disease)  on 2L at home and BiPAP at night; intubated       Hypertension      PVD (peripheral vascular disease)      History of PAT (paroxysmal atrial tachycardia)      Asthma with COPD      BPH (benign prostatic hyperplasia)      Acute osteomyelitis      CABG (Coronary Artery Bypass Graft)        Compound fracture  left leg      S/P primary angioplasty with coronary stent      H/O drainage of abscess  Left femur 2021          FAMILY HISTORY:  Family history of diabetes mellitus (Sibling)    Family hx of lung cancer  brother,  age 82, used to smoke with pt        Allergies    No Known Allergies    Intolerances        ----  REVIEW OF SYSTEMS:  Unable to obtain comprehensive reliable review of systems due to the patient's word finding difficulties but generally the patient denies any active complaints    ----  PHYSICAL EXAM:  GENERAL: patient appears advanced age, nontoxic-appearing, on room air  ENMT: oropharynx clear without erythema, dry mucous membranes, facial asymmetry noted  LUNGS: reduced air entry, no wheezing, coarse breath sounds throughout  HEART: S1/S2, regular rate and regular rhythm, distant heart sounds  GASTROINTESTINAL: abdomen is soft, nontender, nondistended, normoactive bowel sounds, no palpable masses  MUSCULOSKELETAL: no clubbing or cyanosis, no obvious deformity  NEUROLOGIC: awake, alert, readily interactive, right-sided weakness, word finding difficulties, unable to articulate some words clearly    T(C): 37.1 (24 @ 12:22), Max: 37.3 (24 @ 20:03)  HR: 109 (24 @ 12:30) (78 - 118)  BP: 141/79 (24 @ 12:30) (101/56 - 190/90)  RR: 29 (24 @ 12:30) (19 - 34)  SpO2: 94% (24 @ 12:30) (92% - 100%)  Wt(kg): --    ----  INTAKE & OUTPUT:  I&O's Summary    02 May 2024 07:01  -  03 May 2024 07:00  --------------------------------------------------------  IN: 2813.6 mL / OUT: 1650 mL / NET: 1163.7 mL        LABS:                        12.6   12.12 )-----------( 227      ( 03 May 2024 05:15 )             40.5     05-03    142  |  107  |  23  ----------------------------<  225<H>  3.9   |  26  |  1.70<H>    Ca    9.4      03 May 2024 05:15  Phos  2.6     05-03  Mg     2.0     05-03        Urinalysis Basic - ( 03 May 2024 05:15 )    Color: x / Appearance: x / SG: x / pH: x  Gluc: 225 mg/dL / Ketone: x  / Bili: x / Urobili: x   Blood: x / Protein: x / Nitrite: x   Leuk Esterase: x / RBC: x / WBC x   Sq Epi: x / Non Sq Epi: x / Bacteria: x      CAPILLARY BLOOD GLUCOSE      POCT Blood Glucose.: 222 mg/dL (03 May 2024 12:19)  POCT Blood Glucose.: 233 mg/dL (03 May 2024 05:22)  POCT Blood Glucose.: 242 mg/dL (02 May 2024 22:59)  POCT Blood Glucose.: 205 mg/dL (02 May 2024 17:52)        Culture - Urine (collected 24 @ 01:15)  Source: Clean Catch Clean Catch (Midstream)  Final Report (24 @ 12:08):    No growth    Culture - Blood (collected 24 @ 23:50)  Source: .Blood Blood-Peripheral  Preliminary Report (24 @ 05:01):    No growth at 48 Hours    Culture - Blood (collected 24 @ 23:45)  Source: .Blood Blood-Peripheral  Preliminary Report (24 @ 05:01):    No growth at 48 Hours        ----  DATA REVIEW:  Personally reviewed:  -Vital sign trends: Afebrile. Heart rate is labile. Tachycardic during my assessment.   -Laboratory results:    -Radiology results:    -Microbio results:    -Consultant recommendations:      .      ----  ACTIVE MEDICATIONS (by system):  - CV - aMIOdarone    Tablet 400 milliGRAM(s) Oral every 8 hours  - CV - apixaban 2.5 milliGRAM(s) Oral every 12 hours  - CV - aspirin  chewable 81 milliGRAM(s) Enteral Tube daily  - CV - atorvastatin 80 milliGRAM(s) Oral at bedtime  - CV - midodrine 10 milliGRAM(s) Oral every 8 hours  - ENDO - insulin lispro (ADMELOG) corrective regimen sliding scale   SubCutaneous every 6 hours  - GI - pantoprazole   Suspension 40 milliGRAM(s) Oral daily  - ID - ciprofloxacin   IVPB 400 milliGRAM(s) IV Intermittent every 12 hours  - MSK - acetaminophen   Oral Liquid .. 650 milliGRAM(s) Oral every 6 hours PRN  - PULM - albuterol/ipratropium for Nebulization 3 milliLiter(s) Nebulizer every 6 hours  - PULM - buDESOnide    Inhalation Suspension 0.5 milliGRAM(s) Inhalation two times a day       Name: YEU COTTO  MRN: [id number]  LOCATION: [facility]    ----  Patient is a 83y old  Male who presents with a chief complaint of AFib w/ RVR (03 May 2024 12:05)      FROM ADMISSION H+P:   HPI:  83yoM PMHx AFib on Eliquis, CAD, HTN, DM, HLD, COPD, osteomyelitis presents c/o shortness of breath, chest discomfort. Pt reports onset of rapid heart rate this morning, HR measured 160s when he checked his pulse ox. Also endorses dyspnea exacerbated by movement.     ----  INTERVAL HPI/OVERNIGHT EVENTS: Pt seen and evaluated at the bedside. No acute overnight events occurred.  The patient is not able to provide reliable history at this time due to word finding difficulties.  The patient was able to pass his bedside evaluation and he also self DC'd his NG tube earlier this morning.  The patient was weaned off of phenylephrine.    ----  PAST MEDICAL & SURGICAL HISTORY:  Diabetes Mellitus, Type II      CAD (Coronary Artery Disease)  s/p 3v CABG ; stents placed in [place] in       Dyslipidemia      Osteomyelitis      COPD (chronic obstructive pulmonary disease)  on 2L at home and BiPAP at night; intubated       Hypertension      PVD (peripheral vascular disease)      History of PAT (paroxysmal atrial tachycardia)      Asthma with COPD      BPH (benign prostatic hyperplasia)      Acute osteomyelitis      CABG (Coronary Artery Bypass Graft)        Compound fracture  left leg      S/P primary angioplasty with coronary stent      H/O drainage of abscess  Left femur 2021          FAMILY HISTORY:  Family history of diabetes mellitus (Sibling)    Family hx of lung cancer  brother,  age 82, used to smoke with pt        Allergies    No Known Allergies    Intolerances        ----  REVIEW OF SYSTEMS:  Unable to obtain comprehensive reliable review of systems due to the patient's word finding difficulties but generally the patient denies any active complaints    ----  PHYSICAL EXAM:  GENERAL: patient appears advanced age, nontoxic-appearing, on room air  ENMT: oropharynx clear without erythema, dry mucous membranes, facial asymmetry noted  LUNGS: reduced air entry, no wheezing, coarse breath sounds throughout  HEART: S1/S2, regular rate and regular rhythm, distant heart sounds  GASTROINTESTINAL: abdomen is soft, nontender, nondistended, normoactive bowel sounds, no palpable masses  MUSCULOSKELETAL: no clubbing or cyanosis, no obvious deformity  NEUROLOGIC: awake, alert, readily interactive, right-sided weakness, word finding difficulties, unable to articulate some words clearly    T(C): 37.1 (24 @ 12:22), Max: 37.3 (24 @ 20:03)  HR: 109 (24 @ 12:30) (78 - 118)  BP: 141/79 (24 @ 12:30) (101/56 - 190/90)  RR: 29 (24 @ 12:30) (19 - 34)  SpO2: 94% (24 @ 12:30) (92% - 100%)  Wt(kg): --    ----  INTAKE & OUTPUT:  I&O's Summary    02 May 2024 07:01  -  03 May 2024 07:00  --------------------------------------------------------  IN: 2813.6 mL / OUT: 1650 mL / NET: 1163.7 mL        LABS:                        12.6   12.12 )-----------( 227      ( 03 May 2024 05:15 )             40.5     05-03    142  |  107  |  23  ----------------------------<  225<H>  3.9   |  26  |  1.70<H>    Ca    9.4      03 May 2024 05:15  Phos  2.6     05-03  Mg     2.0     05-03        Urinalysis Basic - ( 03 May 2024 05:15 )    Color: x / Appearance: x / SG: x / pH: x  Gluc: 225 mg/dL / Ketone: x  / Bili: x / Urobili: x   Blood: x / Protein: x / Nitrite: x   Leuk Esterase: x / RBC: x / WBC x   Sq Epi: x / Non Sq Epi: x / Bacteria: x      CAPILLARY BLOOD GLUCOSE      POCT Blood Glucose.: 222 mg/dL (03 May 2024 12:19)  POCT Blood Glucose.: 233 mg/dL (03 May 2024 05:22)  POCT Blood Glucose.: 242 mg/dL (02 May 2024 22:59)  POCT Blood Glucose.: 205 mg/dL (02 May 2024 17:52)        Culture - Urine (collected 24 @ 01:15)  Source: Clean Catch Clean Catch (Midstream)  Final Report (24 @ 12:08):    No growth    Culture - Blood (collected 24 @ 23:50)  Source: .Blood Blood-Peripheral  Preliminary Report (24 @ 05:01):    No growth at 48 Hours    Culture - Blood (collected 24 @ 23:45)  Source: .Blood Blood-Peripheral  Preliminary Report (24 @ 05:01):    No growth at 48 Hours        ----  DATA REVIEW:  Personally reviewed:  -Vital sign trends: Afebrile. Heart rate is labile. Tachycardic during my assessment.   -Laboratory results:  Minimal leukocytosis, stable h/h, stable lytes and renal indices  -Radiology results:  no new imaging  -Microbio results:  no new micro data  -Consultant recommendations:  reviewed ICU recs, reviewed SLP recs - diet advacned but then pt made NPO due to poor tolerance of secretions reported later in the day    .      ----  ACTIVE MEDICATIONS (by system):  - CV - aMIOdarone    Tablet 400 milliGRAM(s) Oral every 8 hours  - CV - apixaban 2.5 milliGRAM(s) Oral every 12 hours  - CV - aspirin  chewable 81 milliGRAM(s) Enteral Tube daily  - CV - atorvastatin 80 milliGRAM(s) Oral at bedtime  - CV - midodrine 10 milliGRAM(s) Oral every 8 hours  - ENDO - insulin lispro (ADMELOG) corrective regimen sliding scale   SubCutaneous every 6 hours  - GI - pantoprazole   Suspension 40 milliGRAM(s) Oral daily  - ID - ciprofloxacin   IVPB 400 milliGRAM(s) IV Intermittent every 12 hours  - MSK - acetaminophen   Oral Liquid .. 650 milliGRAM(s) Oral every 6 hours PRN  - PULM - albuterol/ipratropium for Nebulization 3 milliLiter(s) Nebulizer every 6 hours  - PULM - buDESOnide    Inhalation Suspension 0.5 milliGRAM(s) Inhalation two times a day

## 2024-05-04 NOTE — PROGRESS NOTE ADULT - SUBJECTIVE AND OBJECTIVE BOX
CHIEF COMPLAINT/INTERVAL HISTORY:  Pt. seen and evaluated for acute CVA and Afib.  Pt. is in no distress.  Pt. with word finding difficulties, able to nod to some questions.   Denies having CP or SOB.      REVIEW OF SYSTEMS:  No fever, CP, SOB, or abdominal pain.      Vital Signs Last 24 Hrs  T(C): 37.4 (04 May 2024 05:14), Max: 37.4 (04 May 2024 05:14)  T(F): 99.3 (04 May 2024 05:14), Max: 99.3 (04 May 2024 05:14)  HR: 100 (04 May 2024 07:30) (93 - 120)  BP: 119/57 (04 May 2024 07:30) (97/51 - 189/84)  BP(mean): 82 (04 May 2024 07:30) (73 - 121)  RR: 20 (04 May 2024 07:30) (18 - 30)  SpO2: 92% (04 May 2024 07:30) (92% - 99%)    Parameters below as of 04 May 2024 07:00  Patient On (Oxygen Delivery Method): room air        PHYSICAL EXAM:  GENERAL: NAD  HEENT: preferential gaze to left, hearing normal, conjunctiva and sclera clear, +NGT  Chest: coarse BS, no wheezing  CV: S1S2, RRR,   GI: soft, +BS, NT/ND  Musculoskeletal: no LE edema  Neuro: eyes deviated to left, +word finding difficulties, +right sided weakness  Psychiatric: calm  Skin: warm and dry    LABS:                        12.6   12.12 )-----------( 227      ( 03 May 2024 05:15 )             40.5     05-03    142  |  107  |  23  ----------------------------<  225<H>  3.9   |  26  |  1.70<H>    Ca    9.4      03 May 2024 05:15  Phos  2.6     05-03  Mg     2.0     05-03        Urinalysis Basic - ( 03 May 2024 05:15 )    Color: x / Appearance: x / SG: x / pH: x  Gluc: 225 mg/dL / Ketone: x  / Bili: x / Urobili: x   Blood: x / Protein: x / Nitrite: x   Leuk Esterase: x / RBC: x / WBC x   Sq Epi: x / Non Sq Epi: x / Bacteria: x

## 2024-05-04 NOTE — PROGRESS NOTE ADULT - SUBJECTIVE AND OBJECTIVE BOX
[facility] -- Saud Aguilar, Génesis Louise, Carroll Haney Savella  Office # [phone]      Follow Up:  hypotension, AF     Subjective/Observations: Patient seen and examined. Events noted. Resting  in bed. off of [place]. Unable to provide meaningful information 2/2 aphasia      REVIEW OF SYSTEMS: Limited 2/2 comorbidities     PAST MEDICAL & SURGICAL HISTORY:  Diabetes Mellitus, Type II      CAD (Coronary Artery Disease)  s/p 3v CABG 2004; stents placed in [place] in 2019      Dyslipidemia      Osteomyelitis      COPD (chronic obstructive pulmonary disease)  on 2L at home and BiPAP at night; intubated 6/18      Hypertension      PVD (peripheral vascular disease)      History of PAT (paroxysmal atrial tachycardia)      Asthma with COPD      BPH (benign prostatic hyperplasia)      Acute osteomyelitis      CABG (Coronary Artery Bypass Graft)  2004      Compound fracture  left leg      S/P primary angioplasty with coronary stent      H/O drainage of abscess  Left femur 12/2021          MEDICATIONS  (STANDING):  albuterol/ipratropium for Nebulization 3 milliLiter(s) Nebulizer every 6 hours  aMIOdarone    Tablet 400  Oral   aMIOdarone    Tablet 200 milliGRAM(s) Oral daily  apixaban 2.5 milliGRAM(s) Oral every 12 hours  aspirin  chewable 81 milliGRAM(s) Enteral Tube daily  atorvastatin 80 milliGRAM(s) Oral at bedtime  buDESOnide    Inhalation Suspension 0.5 milliGRAM(s) Inhalation two times a day  ciprofloxacin   IVPB 400 milliGRAM(s) IV Intermittent every 12 hours  insulin glargine Injectable (LANTUS) 10 Unit(s) SubCutaneous at bedtime  insulin lispro (ADMELOG) corrective regimen sliding scale   SubCutaneous every 6 hours  midodrine 10 milliGRAM(s) Oral every 8 hours    MEDICATIONS  (PRN):  acetaminophen   Oral Liquid .. 650 milliGRAM(s) Oral every 6 hours PRN Temp greater or equal to 38.5C (101.3F)      Allergies    No Known Allergies    Intolerances            Vital Signs Last 24 Hrs  T(C): 36.9 (04 May 2024 12:01), Max: 37.4 (04 May 2024 05:14)  T(F): 98.5 (04 May 2024 12:01), Max: 99.3 (04 May 2024 05:14)  HR: 100 (04 May 2024 10:30) (96 - 120)  BP: 137/65 (04 May 2024 10:00) (97/51 - 189/84)  BP(mean): 94 (04 May 2024 10:00) (73 - 121)  RR: 20 (04 May 2024 10:30) (18 - 30)  SpO2: 93% (04 May 2024 10:30) (91% - 99%)    Parameters below as of 04 May 2024 08:01  Patient On (Oxygen Delivery Method): room air        I&O's Summary    03 May 2024 07:01  -  04 May 2024 07:00  --------------------------------------------------------  IN: 590 mL / OUT: 1000 mL / NET: -410 mL    04 May 2024 07:01  -  04 May 2024 12:06  --------------------------------------------------------  IN: 50 mL / OUT: 0 mL / NET: 50 mL          PHYSICAL EXAM:  TELE:    Constitutional: NAD, awake   HEENT: Moist Mucous Membranes, Anicteric  Pulmonary: Decreased breath sounds b/l. No rales, crackles or wheeze appreciated.   Cardiovascular: Regular, S1 and S2, No murmurs, rubs, gallops or clicks  Gastrointestinal: Bowel Sounds present, soft, nontender.   Lymph: No peripheral edema. No lymphadenopathy.  Skin: No visible rashes or ulcers.  Psych:  Mood & affect appropriate for situation    LABS: All Labs Reviewed:                        11.8   10.40 )-----------( 223      ( 04 May 2024 05:25 )             38.5                         12.6   12.12 )-----------( 227      ( 03 May 2024 05:15 )             40.5                         13.9   9.75  )-----------( 225      ( 02 May 2024 05:30 )             44.3     04 May 2024 05:25    144    |  109    |  27     ----------------------------<  236    4.0     |  27     |  1.80   03 May 2024 05:15    142    |  107    |  23     ----------------------------<  225    3.9     |  26     |  1.70   02 May 2024 05:30    138    |  106    |  17     ----------------------------<  196    4.7     |  27     |  1.50     Ca    9.5        04 May 2024 05:25  Ca    9.4        03 May 2024 05:15  Ca    9.0        02 May 2024 05:30  Phos  2.2       04 May 2024 05:25  Phos  2.6       03 May 2024 05:15  Phos  3.2       02 May 2024 05:30  Mg     2.2       04 May 2024 05:25  Mg     2.0       03 May 2024 05:15  Mg     2.0       02 May 2024 05:30    TPro  6.2    /  Alb  2.4    /  TBili  0.5    /  DBili  x      /  AST  19     /  ALT  21     /  AlkPhos  95     04 May 2024 05:25        - Troponin: <-617.0

## 2024-05-04 NOTE — PROGRESS NOTE ADULT - SUBJECTIVE AND OBJECTIVE BOX
Date/Time Patient Seen:  		  Referring MD:   Data Reviewed	       Patient is a 83y old  Male who presents with a chief complaint of AFib w/ RVR (03 May 2024 18:12)      Subjective/HPI     PAST MEDICAL & SURGICAL HISTORY:  Diabetes Mellitus, Type II    CAD (Coronary Artery Disease)  s/p 3v CABG 2004; stents placed in [place] in 2019    Dyslipidemia    Asymptomatic Peripheral Vascular Disease    Osteomyelitis    COPD (chronic obstructive pulmonary disease)  on 2L at home and BiPAP at night; intubated 6/18    Diabetes mellitus    Hypertension    PVD (peripheral vascular disease)    History of PAT (paroxysmal atrial tachycardia)    Asthma with COPD    BPH (benign prostatic hyperplasia)    Acute osteomyelitis    CABG (Coronary Artery Bypass Graft)  2004    Compound fracture  left leg    S/P primary angioplasty with coronary stent    H/O drainage of abscess  Left femur 12/2021          Medication list         MEDICATIONS  (STANDING):  albuterol/ipratropium for Nebulization 3 milliLiter(s) Nebulizer every 6 hours  aMIOdarone    Tablet 400  Oral   aMIOdarone    Tablet 200 milliGRAM(s) Oral daily  apixaban 2.5 milliGRAM(s) Oral every 12 hours  aspirin  chewable 81 milliGRAM(s) Enteral Tube daily  atorvastatin 80 milliGRAM(s) Oral at bedtime  buDESOnide    Inhalation Suspension 0.5 milliGRAM(s) Inhalation two times a day  ciprofloxacin   IVPB 400 milliGRAM(s) IV Intermittent every 12 hours  insulin lispro (ADMELOG) corrective regimen sliding scale   SubCutaneous every 6 hours  midodrine 10 milliGRAM(s) Oral every 8 hours    MEDICATIONS  (PRN):  acetaminophen   Oral Liquid .. 650 milliGRAM(s) Oral every 6 hours PRN Temp greater or equal to 38.5C (101.3F)         Vitals log        ICU Vital Signs Last 24 Hrs  T(C): 37.4 (04 May 2024 05:14), Max: 37.4 (04 May 2024 05:14)  T(F): 99.3 (04 May 2024 05:14), Max: 99.3 (04 May 2024 05:14)  HR: 96 (04 May 2024 06:00) (93 - 120)  BP: 117/56 (04 May 2024 06:00) (97/51 - 189/84)  BP(mean): 81 (04 May 2024 06:00) (73 - 121)  ABP: --  ABP(mean): --  RR: 19 (04 May 2024 06:00) (18 - 30)  SpO2: 93% (04 May 2024 06:00) (92% - 99%)    O2 Parameters below as of 04 May 2024 01:03  Patient On (Oxygen Delivery Method): room air                 Input and Output:  I&O's Detail    02 May 2024 07:01  -  03 May 2024 07:00  --------------------------------------------------------  IN:    Free Water: 1020 mL    Glucerna 1.5: 405 mL    IV PiggyBack: 200 mL    Phenylephrine: 291.6 mL    Phenylephrine: 897.1 mL  Total IN: 2813.6 mL    OUT:    Incontinent per Condom Catheter (mL): 1650 mL  Total OUT: 1650 mL    Total NET: 1163.7 mL      03 May 2024 07:01  -  04 May 2024 06:20  --------------------------------------------------------  IN:    Free Water: 130 mL    Glucerna 1.5: 360 mL    IV PiggyBack: 100 mL  Total IN: 590 mL    OUT:    Incontinent per Condom Catheter (mL): 1000 mL  Total OUT: 1000 mL    Total NET: -410 mL          Lab Data                        12.6   12.12 )-----------( 227      ( 03 May 2024 05:15 )             40.5     05-03    142  |  107  |  23  ----------------------------<  225<H>  3.9   |  26  |  1.70<H>    Ca    9.4      03 May 2024 05:15  Phos  2.6     05-03  Mg     2.0     05-03      ABG - ( 04 May 2024 05:11 )  pH, Arterial: 7.40  pH, Blood: x     /  pCO2: 43    /  pO2: 75    / HCO3: 27    / Base Excess: 1.8   /  SaO2: 96.7                    Review of Systems	      Objective     Physical Examination    heart s1s2  lung dc BS  head nc      Pertinent Lab findings & Imaging      Eliecer:  NO   Adequate UO     I&O's Detail    02 May 2024 07:01  -  03 May 2024 07:00  --------------------------------------------------------  IN:    Free Water: 1020 mL    Glucerna 1.5: 405 mL    IV PiggyBack: 200 mL    Phenylephrine: 291.6 mL    Phenylephrine: 897.1 mL  Total IN: 2813.6 mL    OUT:    Incontinent per Condom Catheter (mL): 1650 mL  Total OUT: 1650 mL    Total NET: 1163.7 mL      03 May 2024 07:01  -  04 May 2024 06:20  --------------------------------------------------------  IN:    Free Water: 130 mL    Glucerna 1.5: 360 mL    IV PiggyBack: 100 mL  Total IN: 590 mL    OUT:    Incontinent per Condom Catheter (mL): 1000 mL  Total OUT: 1000 mL    Total NET: -410 mL               Discussed with:     Cultures:	        Radiology

## 2024-05-04 NOTE — PROGRESS NOTE ADULT - ATTENDING COMMENTS
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  Not a TNK candidate as he was on full AC.    Neuro: posterior L MCA CVA with multiple smaller acute b/l cerebral and cerebellar strokes. Continue eliquis and statin. Neurological status is worse today. Patient is more lethargic and appears depressed. Does wake up but not very interactive. PT/OT/speech/swallow evals  CV: afib controlled on amio, continue eliquis.   Respiratory: asthma, continue inhaled steroids, bronchodilators. S/p nucala injection 5/3 (wife brought in). Chronic hypercapnic respiratory failure, not hypercapnic this morning, but did not get BiPAP overnight due to concern for secretion retention in mouth. Much riskier currently. Will continue to monitor blood gases.  Renal: CKD stable  GI: dysphagia. NGT replaced yesterday. Tube feeds restarted. Passed MBS, but was not swallowing pureed food. Speech therapy will monitor.   ID: chronic LE osteo, continue cipro.   Endo: DM2. Continues to be hyperglycemic. Lantus 10 units qhs, sliding scale.   Ethics: Full code.  Discussed with wife at bedside.     Patient continues to require ICU level care due to tenuous respiratory status. 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  Not a TNK candidate as he was on full AC.    Neuro: posterior L MCA CVA with multiple smaller acute b/l cerebral and cerebellar strokes. Continue eliquis and statin. Neurological status is worse today. Patient is more lethargic and appears depressed. Does wake up but not very interactive. PT/OT/speech/swallow evals  CV: afib controlled on amio, continue eliquis. LUE is warm to touch on Inner aspect of antecubital fossa. Tender. indurated. Will obtain repeat LUE duplex.  Respiratory: asthma, continue inhaled steroids, bronchodilators. S/p nucala injection 5/3 (wife brought in). Chronic hypercapnic respiratory failure, not hypercapnic this morning, but did not get BiPAP overnight due to concern for secretion retention in mouth. Much riskier currently. Will continue to monitor blood gases.  Renal: CKD stable  GI: dysphagia. NGT replaced yesterday. Tube feeds restarted. Passed MBS, but was not swallowing pureed food. Speech therapy will monitor.   ID: chronic LE osteo, continue cipro.   Endo: DM2. Continues to be hyperglycemic. Lantus 10 units qhs, sliding scale.   Ethics: Full code.  Discussed with wife at bedside.     Patient continues to require ICU level care due to tenuous respiratory status.

## 2024-05-04 NOTE — PROGRESS NOTE ADULT - ASSESSMENT
83-year-old  black male with history of COPD coronary artery disease hypertension diabetes hyperlipidemia atrial fibrillation on Eliquis as well as osteomyelitis who presents now to the emergency department at [facility] complaining of rapid heart rate    jacy  copd  AF  OP  OA  CAD  HTN  DM  HLD  OM hx  CVA     on TF - swallow studies noted  ABG noted  s/p Nucala for Asthma - as per home rx regimen  CVA tx in progress - CT head repeat noted -     follows with Dr Ryland ashley med  uses NIV - BIPAP night time for JACY - sleep hygiene reviewed  cardio eval - [facility] rx regimen  BP control  DM care  AF rate and rhythm control  TFT  outpatient record reviewed  proventil PRN - Singulair - copd  spoke with WIFE  on emp ABX   wound care

## 2024-05-04 NOTE — PROGRESS NOTE ADULT - ASSESSMENT
83yoM PMHx AFib on Eliquis, CAD, HTN, DM, HLD, COPD, osteomyelitis presents c/o shortness of breath, chest discomfort. Admitted for AFib w/ RVR. Hospital course c/b CVA on 4/27/2024. 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      Problem/Plan - 1:  ·  Problem: Cerebrovascular accident (CVA).   ·  Plan: - MRI Head: Multiple acute infarcts scattered throughout the bilateral cerebral and cerebellar hemispheres. More dominant acute to subacute infarction in the posterior left MCA vascular distribution. Chronic small vessel disease.  - No evidence of hemorrhagic conversion -repeat head CT from May 1 with no acute findings  - Continue Eliquis 2.5mg via NGT Q12h  - Weaned off phenylephrine  - Continue aspirin 81mg via NGT daily and Lipitor 80mg via NGT QHS  - Neuro checks per protocol  - Diet was advanced but then reverted back to NPO due to excess secretions and poor tolerance of po diet  - s/p MBS  - Fall and aspiration precautions   - Managment per ICU     Problem/Plan - 2:  ·  Problem: Atrial fibrillation with RVR.   ·  Plan: History of Afib, on home Metoprolol BID and Eliquis   - TTE 4/26/2024: LV no well visualized however LV probably normal based on limited views, moderate thickening of aortic valve leaflets, trace TR, dilated IVC with normal inspiratory collapse, normal pulmonary artery pressure  - Resumed AC  - Hold home BB to allow for permissive HTN   - Continue po amiodarone  - Telemetry monitoring, electrolyte monitoring  - Plan per ICU.     Problem/Plan - 3:  ·  Problem: Elevated troponin.   ·  Plan: Elevated trop on admission likely 2/2 Afib w/ RVR   - TTE 4/26/2024: technically difficult study, LV not well visualized however LV probably normal based on limited views, moderate thickening of aortic valve leaflets, trace TR, dilated IVC with normal inspiratory collapse, normal pulmonary artery pressure  - Trop x2 both sets elevated but downtrended   - No complaints of chest pain, low suspicions of ACS --> monitor for signs and symptoms of ischemia   - Cardio following  - Plan per ICU.     Problem/Plan - 4:  ·  Problem: Open toe wound.   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     Problem/Plan - 5:  ·  Problem: MERRILL (acute kidney injury).   ·  Plan: MERRILL on admission. Baseline Cr around 1.1  - On admission Cr 1.6   - Monitor volume status closely  - Avoid nephrotoxics - hold home furosemide for now  - Renal indices have been stable.     Problem/Plan - 6:  ·  Problem: CAD (coronary artery disease).   ·  Plan: Chronic  - continue asa/statin  - Lipid panel within normal  - Plan per ICU.     Problem/Plan - 7:  ·  Problem: HTN (hypertension).   ·  Plan: Chronic  - DC phenylephrine  - Discontinue lopressor for permissive HTN  - Monitor routine hemodynamics  - Plan per ICU.     Problem/Plan - 8:  ·  Problem: Type 2 diabetes mellitus.   ·  Plan: Chronic, on home insulin (Lantus 10u)  - NPO + Glucerna tube feeds  - hold home insulin  - moderate dose sliding scale  - hypoglycemia protocol in place, fingersticks q ac, q hs   - goal -180 in hospital setting, adjust insulin regimen prn  - HbA1c 6.8  - Plan per ICU.     Problem/Plan - 9:  ·  Problem: COPD (chronic obstructive pulmonary disease).   ·  Plan: Chronic, baseline 2L home O2 and BiPAP qHS   - Hold home montelukast as NPO  - Continue Bipap overnight   - Continue Pulmicort inh BID and Duoneb tx Q6h  - Pulm Dr. Combs consulted  - Plan per ICU.     Problem/Plan - 10:  ·  Problem: Need for prophylactic measure.   ·  Plan; #VTE ppx: Eliquis  #Diet: Diet, NPO with Tube Feed: Tube Feeding Modality: Nasogastric Glucerna 1.5  #Activity: Activity - Increase As Tolerated:   Time/Priority:  Routine (04-27-24 @ 22:51) [Active]  #ACP: Full code  #Dispo: further plans pending clinical course.

## 2024-05-04 NOTE — PROGRESS NOTE ADULT - SUBJECTIVE AND OBJECTIVE BOX
Interval Events: Pt seen and examined at bedside. Yesterday had MBS showing no penetration/aspiration, however did not tolerate puree diet and had poor secretion management so NGT was placed again. Pt seen and examined at bedside. Appears more lethargic, falling asleep during exam. Much less conversive, and has audible gurgling and wet cough.     Review of Systems:  Constitutional: No fever, chills  Neuro: No headache  Resp: +cough. No shortness of breath  CVS: No chest pain  GI: No abdominal pain      ICU Vital Signs Last 24 Hrs  T(C): 37.3 (04 May 2024 08:10), Max: 37.4 (04 May 2024 05:14)  T(F): 99.1 (04 May 2024 08:10), Max: 99.3 (04 May 2024 05:14)  HR: 100 (04 May 2024 08:01) (95 - 120)  BP: 119/57 (04 May 2024 07:30) (97/51 - 189/84)  BP(mean): 82 (04 May 2024 07:30) (73 - 121)  ABP: --  ABP(mean): --  RR: 20 (04 May 2024 07:30) (18 - 30)  SpO2: 92% (04 May 2024 08:01) (92% - 99%)    O2 Parameters below as of 04 May 2024 08:01  Patient On (Oxygen Delivery Method): room air              05-03-24 @ 07:01  -  05-04-24 @ 07:00  --------------------------------------------------------  IN: 590 mL / OUT: 1000 mL / NET: -410 mL        CAPILLARY BLOOD GLUCOSE      POCT Blood Glucose.: 224 mg/dL (04 May 2024 05:01)      I&O's Summary    03 May 2024 07:01  -  04 May 2024 07:00  --------------------------------------------------------  IN: 590 mL / OUT: 1000 mL / NET: -410 mL        Physical Exam:   Gen: Lethargic. Laying in bed. Intermittent wet cough, audible gurgling.  Neuro: Responds to verbal and tactile stimuli however actively falling asleep during exam. +R-sided hemiparesis in upper > lower extremities.  +aphasia and dysarthria. PERRLA. RUE 1/5, RLE 0/5, LUE 3/5, LLE 1/5  HEENT: +NGT. NC/AT, +L cataract  Resp: Course breath sounds B/L. No accessory muscle use  CVS: RRR. +S1/S2. No murmurs  Abd: Soft. NT/ND. +BS  Ext: No edema in b/l lower extremity. +Chronic R big toe wound w/ associated desquamation of skin, no erythema or drainage    Meds:  ciprofloxacin   IVPB IV Intermittent    aMIOdarone    Tablet Oral  aMIOdarone    Tablet Oral  midodrine Oral    atorvastatin Oral  insulin glargine Injectable (LANTUS) SubCutaneous  insulin lispro (ADMELOG) corrective regimen sliding scale SubCutaneous    albuterol/ipratropium for Nebulization Nebulizer  buDESOnide    Inhalation Suspension Inhalation    acetaminophen   Oral Liquid .. Oral PRN      apixaban Oral  aspirin  chewable Enteral Tube                                        11.8   10.40 )-----------( 223      ( 04 May 2024 05:25 )             38.5       05-04    144  |  109<H>  |  27<H>  ----------------------------<  236<H>  4.0   |  27  |  1.80<H>    Ca    9.5      04 May 2024 05:25  Phos  2.2     05-04  Mg     2.2     05-04    TPro  6.2  /  Alb  2.4<L>  /  TBili  0.5  /  DBili  x   /  AST  19  /  ALT  21  /  AlkPhos  95  05-04            Urinalysis Basic - ( 04 May 2024 05:25 )    Color: x / Appearance: x / SG: x / pH: x  Gluc: 236 mg/dL / Ketone: x  / Bili: x / Urobili: x   Blood: x / Protein: x / Nitrite: x   Leuk Esterase: x / RBC: x / WBC x   Sq Epi: x / Non Sq Epi: x / Bacteria: x      Clean Catch Clean Catch (Midstream)   No growth -- 05-01 @ 01:15  .Blood Blood-Peripheral   No growth at 72 Hours -- 04-30 @ 23:50  .Blood Blood-Peripheral   No growth at 72 Hours -- 04-30 @ 23:45      Radiology:  CXR (Personal Read): No acute intrapulmonary pathology, NGT in place    Tubes/Lines: Peripheral IV      GLOBAL ISSUE/BEST PRACTICE:  Analgesia: N  Sedation: N  HOB elevation: Y  Stress ulcer prophylaxis: Y  VTE prophylaxis: Y  Glycemic control: Y (Linsey العراقي)  Nutrition: Y    CODE STATUS: Full Code       Interval Events: Pt seen and examined at bedside. Yesterday had MBS showing no penetration/aspiration, however did not tolerate puree diet and had poor secretion management so NGT was placed again. Pt seen and examined at bedside. Appears more lethargic, falling asleep during exam. Much less conversive, and has audible gurgling and wet cough.     Review of Systems:  Constitutional: No fever, chills  Neuro: No headache  Resp: +cough. No shortness of breath  CVS: No chest pain  GI: No abdominal pain      ICU Vital Signs Last 24 Hrs  T(C): 37.3 (04 May 2024 08:10), Max: 37.4 (04 May 2024 05:14)  T(F): 99.1 (04 May 2024 08:10), Max: 99.3 (04 May 2024 05:14)  HR: 100 (04 May 2024 08:01) (95 - 120)  BP: 119/57 (04 May 2024 07:30) (97/51 - 189/84)  BP(mean): 82 (04 May 2024 07:30) (73 - 121)  ABP: --  ABP(mean): --  RR: 20 (04 May 2024 07:30) (18 - 30)  SpO2: 92% (04 May 2024 08:01) (92% - 99%)    O2 Parameters below as of 04 May 2024 08:01  Patient On (Oxygen Delivery Method): room air              05-03-24 @ 07:01  -  05-04-24 @ 07:00  --------------------------------------------------------  IN: 590 mL / OUT: 1000 mL / NET: -410 mL        CAPILLARY BLOOD GLUCOSE      POCT Blood Glucose.: 224 mg/dL (04 May 2024 05:01)      I&O's Summary    03 May 2024 07:01  -  04 May 2024 07:00  --------------------------------------------------------  IN: 590 mL / OUT: 1000 mL / NET: -410 mL        Physical Exam:   Gen: Lethargic. Laying in bed. Intermittent wet cough, audible gurgling.  Neuro: Responds to verbal and tactile stimuli however actively falling asleep during exam. +R-sided hemiparesis in upper > lower extremities.  +aphasia and dysarthria. PERRLA. RUE 1/5, RLE 0/5, LUE 3/5, LLE 1/5  HEENT: +NGT. NC/AT, +L cataract  Resp: Course breath sounds B/L. No accessory muscle use  CVS: RRR. +S1/S2. No murmurs  Abd: Soft. NT/ND. +BS  Ext: +RUE erythema and TTP. +B/L UE swelling. No edema in b/l lower extremity. +Chronic R big toe wound w/ associated desquamation of skin, no erythema or drainage    Meds:  ciprofloxacin   IVPB IV Intermittent    aMIOdarone    Tablet Oral  aMIOdarone    Tablet Oral  midodrine Oral    atorvastatin Oral  insulin glargine Injectable (LANTUS) SubCutaneous  insulin lispro (ADMELOG) corrective regimen sliding scale SubCutaneous    albuterol/ipratropium for Nebulization Nebulizer  buDESOnide    Inhalation Suspension Inhalation    acetaminophen   Oral Liquid .. Oral PRN      apixaban Oral  aspirin  chewable Enteral Tube                                        11.8   10.40 )-----------( 223      ( 04 May 2024 05:25 )             38.5       05-04    144  |  109<H>  |  27<H>  ----------------------------<  236<H>  4.0   |  27  |  1.80<H>    Ca    9.5      04 May 2024 05:25  Phos  2.2     05-04  Mg     2.2     05-04    TPro  6.2  /  Alb  2.4<L>  /  TBili  0.5  /  DBili  x   /  AST  19  /  ALT  21  /  AlkPhos  95  05-04            Urinalysis Basic - ( 04 May 2024 05:25 )    Color: x / Appearance: x / SG: x / pH: x  Gluc: 236 mg/dL / Ketone: x  / Bili: x / Urobili: x   Blood: x / Protein: x / Nitrite: x   Leuk Esterase: x / RBC: x / WBC x   Sq Epi: x / Non Sq Epi: x / Bacteria: x      Clean Catch Clean Catch (Midstream)   No growth -- 05-01 @ 01:15  .Blood Blood-Peripheral   No growth at 72 Hours -- 04-30 @ 23:50  .Blood Blood-Peripheral   No growth at 72 Hours -- 04-30 @ 23:45      Radiology:  CXR (Personal Read): No acute intrapulmonary pathology, NGT in place    Tubes/Lines: Peripheral IV      GLOBAL ISSUE/BEST PRACTICE:  Analgesia: N  Sedation: N  HOB elevation: Y  Stress ulcer prophylaxis: Y  VTE prophylaxis: Y  Glycemic control: Y (Linsey العراقي)  Nutrition: Y    CODE STATUS: Full Code       Interval Events: Pt seen and examined at bedside. Yesterday had MBS showing no penetration/aspiration, however did not tolerate puree diet and had poor secretion management so NGT was placed again. Pt seen and examined at bedside. Appears more lethargic, falling asleep during exam. Much less conversive, and has audible gurgling and wet cough.     Review of Systems:  Constitutional: No fever, chills  Neuro: No headache  Resp: +cough. No shortness of breath  CVS: No chest pain  GI: No abdominal pain      ICU Vital Signs Last 24 Hrs  T(C): 37.3 (04 May 2024 08:10), Max: 37.4 (04 May 2024 05:14)  T(F): 99.1 (04 May 2024 08:10), Max: 99.3 (04 May 2024 05:14)  HR: 100 (04 May 2024 08:01) (95 - 120)  BP: 119/57 (04 May 2024 07:30) (97/51 - 189/84)  BP(mean): 82 (04 May 2024 07:30) (73 - 121)  ABP: --  ABP(mean): --  RR: 20 (04 May 2024 07:30) (18 - 30)  SpO2: 92% (04 May 2024 08:01) (92% - 99%)    O2 Parameters below as of 04 May 2024 08:01  Patient On (Oxygen Delivery Method): room air              05-03-24 @ 07:01  -  05-04-24 @ 07:00  --------------------------------------------------------  IN: 590 mL / OUT: 1000 mL / NET: -410 mL        CAPILLARY BLOOD GLUCOSE      POCT Blood Glucose.: 224 mg/dL (04 May 2024 05:01)      I&O's Summary    03 May 2024 07:01  -  04 May 2024 07:00  --------------------------------------------------------  IN: 590 mL / OUT: 1000 mL / NET: -410 mL        Physical Exam:   Gen: Lethargic. Laying in bed. Intermittent wet cough, audible gurgling.  Neuro: Responds to verbal and tactile stimuli however actively falling asleep during exam. +R-sided hemiparesis in upper > lower extremities.  +aphasia and dysarthria. PERRLA. RUE 1/5, RLE 0/5, LUE 3/5, LLE 1/5  HEENT: +NGT. NC/AT, +L cataract  Resp: Coarse breath sounds B/L. No accessory muscle use  CVS: RRR. +S1/S2. No murmurs  Abd: Soft. NT/ND. +BS  Ext: +RUE erythema and TTP. +B/L UE swelling. No edema in b/l lower extremity. +Chronic R big toe wound w/ associated desquamation of skin, no erythema or drainage    Meds:  ciprofloxacin   IVPB IV Intermittent    aMIOdarone    Tablet Oral  aMIOdarone    Tablet Oral  midodrine Oral    atorvastatin Oral  insulin glargine Injectable (LANTUS) SubCutaneous  insulin lispro (ADMELOG) corrective regimen sliding scale SubCutaneous    albuterol/ipratropium for Nebulization Nebulizer  buDESOnide    Inhalation Suspension Inhalation    acetaminophen   Oral Liquid .. Oral PRN      apixaban Oral  aspirin  chewable Enteral Tube                                        11.8   10.40 )-----------( 223      ( 04 May 2024 05:25 )             38.5       05-04    144  |  109<H>  |  27<H>  ----------------------------<  236<H>  4.0   |  27  |  1.80<H>    Ca    9.5      04 May 2024 05:25  Phos  2.2     05-04  Mg     2.2     05-04    TPro  6.2  /  Alb  2.4<L>  /  TBili  0.5  /  DBili  x   /  AST  19  /  ALT  21  /  AlkPhos  95  05-04            Urinalysis Basic - ( 04 May 2024 05:25 )    Color: x / Appearance: x / SG: x / pH: x  Gluc: 236 mg/dL / Ketone: x  / Bili: x / Urobili: x   Blood: x / Protein: x / Nitrite: x   Leuk Esterase: x / RBC: x / WBC x   Sq Epi: x / Non Sq Epi: x / Bacteria: x      Clean Catch Clean Catch (Midstream)   No growth -- 05-01 @ 01:15  .Blood Blood-Peripheral   No growth at 72 Hours -- 04-30 @ 23:50  .Blood Blood-Peripheral   No growth at 72 Hours -- 04-30 @ 23:45      Radiology:  CXR (Personal Read): No acute intrapulmonary pathology, NGT in place    Tubes/Lines: Peripheral IV      GLOBAL ISSUE/BEST PRACTICE:  Analgesia: N  Sedation: N  HOB elevation: Y  Stress ulcer prophylaxis: Y  VTE prophylaxis: Y  Glycemic control: Y (Linsey العراقي)  Nutrition: Y    CODE STATUS: Full Code

## 2024-05-04 NOTE — PROGRESS NOTE ADULT - ASSESSMENT
83-year-old M with history of COPD on home O2 PRN, coronary artery disease s/p CABG,  hypertension, diabetes, hyperlipidemia, atrial fibrillation on Eliquis as well as chronic osteomyelitis who presents to the emergency department at [facility] complaining of rapid heart rate. Cardiology consulted for afib with rvr.    - Presenting with PAF with RVR  - At home he had noted some SOB, his wife checked his HR and noted it up was up to 160s so brought him to the ER, then placed on Cardizem gtt, had missed home BB   - on the morning of 4/27, patient was noted to have new onset aphasia and a code stroke was called.  He was deemed not a candidate for TNK as he is on AC.  He was found to have a L M3 occlusion but was deemed not a candidate for thrombectomy as occlusion too distal.  Later that night, a RRT was called due to worsening NIH score noted by nursing.  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   - Pressors stopped as MAP now improved on midodrine. will avoid antiHTN meds for nwo.   - back in NSR. Currently on PO Amiodarone loading.   - Now back on Eliquis 2.5mg BID. Dose reduced in the setting of renal function and age. Previous Cr of 1.4, if his renal function goes back to baseline of 1.4 would place on full dose Eliquis for CVA protection  - BB on hold for permissive hypertension, though can restart when neuro ok with it.  - cont ASA as per Neuro  - cont high intensity statin  - Continue telemetry  - Monitor and replete electrolytes. Keep K>4.0 and Mg>2.0.

## 2024-05-04 NOTE — PROGRESS NOTE ADULT - ASSESSMENT
83 year old male with a PMH of eosinophilic asthma/COPD, former smoker, chronic hypoxemic and hypercapnic respiratory failure on nocturnal BiPAP, afib on Eliquis, CAD s/p CABG and PCI, HTN, HLD, DM2, L femur fracture with chronic OM who presented to the ED from home with complaints of SOB and tachycardia.      Neuro:   - s/p L MCA ischemic stroke w/ b/l embolic infarcts with no hemorrhagic conversion  - Repeat CTH w/ redemonstration of multipile evolving infarcts  - No longer need permissive HTN, phenylephrine d/c'ed  - c/w ASA and statin  - Passed MBS however did not tolerate PO diet and poor secretion management, +NGT  - c/w PT/OT    Cardio:  - Afib, c/w PO Amio and eliquis  - Permissive HTN no longer necessary, phenylephrine d/c'ed    Pulm:   - Concern for aspiration, continue to monitor  - Hx COPD, COURTNEY, hold nocturnal BiPAP given secretions  - Standing duonebs, budesonide  - S/p nucala injection    GI:  - Passed MBS however did not tolerate PO diet and poor secretion management  - NGT replaced, c/w tube feeds    Renal:   - No acute needs    ID:   - Concern for aspiration  - BCx NGTD, UCx Negative. Afebrile, no leukocytosis  - Trend WBC and fever curve  - continue to monitor off IV abx   - c/w home cipro for suppression of chronic osteomyelitis  - Local wound care for chronic R big toe wound     Endocrine:   - T2DM, glucoses remain supratherapeutic, add home lantus 10u  - LDISS    Heme:   - c/w HSQ q8h per neuro recs 2/2 hemorrhagic conversion risk with full AC  - C/w Eliquis 2.5mg BID based off age and CrCl    #GOC: Full Code

## 2024-05-04 NOTE — CHART NOTE - NSCHARTNOTEFT_GEN_A_CORE
Assessment: Pt seen for nutrition follow-up. Chart reviewed, hospital course noted.    Brief hx:83 year old male with a PMH of eosinophilic asthma/COPD, former smoker, chronic hypoxemic and hypercapnic respiratory failure on nocturnal BiPAP, afib on Eliquis, CAD s/p CABG and PCI, HTN, HLD, DM2, L femur fracture with chronic OM who presented to the ED from home with complaints of SOB and tachycardia.    Visited pt at bedside this am. Pt alert, unable to participate in discussion today. Spouse present during visit. Swallow evaluation yesterday (5/3); SLP recommended puree diet with mildly thick liquids. Pt was started on diet, poor tolerance noted and poor secretion management. NGT replaced. Spoke with RN this am; plan to start TF today at noon at rate of 50ml/hr. No N/V. +BM 5/4. Nocturnal bipap held given secretions. BS remains elevated; receiving lantus 10 units qhs, sliding scale. Pump study not appropriate at this time. Recommend continuing current TF regimen as ordered and advance to goal rate as tolerated. RD remains available and will continue to follow-up.     Factors impacting intake: [ ] none [ ] nausea  [ ] vomiting [ ] diarrhea [ ] constipation  [X]chewing problems [X] swallowing issues  [X] other: NPO with NGT feedings    Diet Prescription: Diet, NPO with Tube Feed:   Tube Feeding Modality: Nasogastric  Glucerna 1.5  Total Volume for 24 Hours (mL): 1350  Continuous  Starting Tube Feed Rate {mL per Hour}: 10  Increase Tube Feed Rate by (mL): 10     Every 4 hours  Until Goal Tube Feed Rate (mL per Hour): 75  Tube Feed Duration (in Hours): 18  Tube Feed Start Time: 12:00  Tube Feed Stop Time: 06:00 (05-03-24 @ 14:08)    Intake: TF not yet to goal    Current Weight: Weight (kg): 101 (04-27 @ 23:25), 5/4 221.5# (1+ dependent, 2+ L arm, 3+ R arm edema noted)  % Weight Change    Pertinent Medications: MEDICATIONS  (STANDING):  albuterol/ipratropium for Nebulization 3 milliLiter(s) Nebulizer every 6 hours  aMIOdarone    Tablet 400  Oral   aMIOdarone    Tablet 200 milliGRAM(s) Oral daily  apixaban 2.5 milliGRAM(s) Oral every 12 hours  aspirin  chewable 81 milliGRAM(s) Enteral Tube daily  atorvastatin 80 milliGRAM(s) Oral at bedtime  buDESOnide    Inhalation Suspension 0.5 milliGRAM(s) Inhalation two times a day  ciprofloxacin   IVPB 400 milliGRAM(s) IV Intermittent every 12 hours  insulin glargine Injectable (LANTUS) 10 Unit(s) SubCutaneous at bedtime  insulin lispro (ADMELOG) corrective regimen sliding scale   SubCutaneous every 6 hours  midodrine 10 milliGRAM(s) Oral every 8 hours  potassium phosphate IVPB 15 milliMole(s) IV Intermittent once    MEDICATIONS  (PRN):  acetaminophen   Oral Liquid .. 650 milliGRAM(s) Oral every 6 hours PRN Temp greater or equal to 38.5C (101.3F)    Pertinent Labs: 05-04 Na144 mmol/L Glu 236 mg/dL<H> K+ 4.0 mmol/L Cr  1.80 mg/dL<H> BUN 27 mg/dL<H> 05-04 Phos 2.2 mg/dL<L> 05-04 Alb 2.4 g/dL<L> 04-26 Chol 124 mg/dL LDL --    HDL 48 mg/dL Trig 93 mg/dL    CAPILLARY BLOOD GLUCOSE  POCT Blood Glucose.: 224 mg/dL (04 May 2024 05:01)  POCT Blood Glucose.: 224 mg/dL (03 May 2024 23:20)  POCT Blood Glucose.: 243 mg/dL (03 May 2024 17:31)  POCT Blood Glucose.: 222 mg/dL (03 May 2024 12:19)    Skin: ecchymotic     Estimated Needs:   [X] no change since previous assessment: based on #/102kg  18-22kcal/kg (1836-2244kcal)  .8-1.0g pro/kg (82-102gm protein)  [ ] recalculated:     Previous Nutrition Diagnosis:   [ ] Inadequate Energy Intake [X]Inadequate Oral Intake [ ] Excessive Energy Intake   [ ] Underweight [ ] Increased Nutrient Needs [ ] Overweight/Obesity [X] Swallowing Difficulty  [ ] Altered GI Function [ ] Unintended Weight Loss [ ] Food & Nutrition Related Knowledge Deficit [ ] Malnutrition     Nutrition Diagnosis is [X] ongoing  [ ] resolved [ ] not applicable     New Nutrition Diagnosis: [X] not applicable     Interventions:   Recommend  [ ] Change Diet To:  [ ] Nutrition Supplement  [ ] Nutrition Support  [X] Other: Continue current TF as ordered; Glucerna 1.5 advance to goal rate of 75ml/hr x 18 hours as tolerated (to provide 1350ml total volume formula, 2025kcal, 111gm protein) + free water flush per ICU team    Monitoring and Evaluation:   [ ] PO intake [ x ] Tolerance to diet prescription [ x ] weights [ x ] labs[ x ] follow up per protocol  [X] other: s/s GI distress, bowel function, skin integrity/ edema

## 2024-05-05 NOTE — PROGRESS NOTE ADULT - CRITICAL CARE SERVICES PROVIDED
Patient is critically ill, requiring critical care services.
2

## 2024-05-05 NOTE — PROGRESS NOTE ADULT - SUBJECTIVE AND OBJECTIVE BOX
Interval Events: Overnight pt with elevated BP to 180s SBP, midodrine was held. BiPAP also held in the setting of increased secretions. Pt also pulled NGT this AM. Pt seen and examined at bedside. Appears more awake today, however not articulating words, responds to most questions with head-nod. Plan for transfer to floors.    Review of Systems:  Constitutional: No fever  Neuro: No headache  Resp: No shortness of breath  CVS: No chest pain      ICU Vital Signs Last 24 Hrs  T(C): 37.4 (05 May 2024 07:42), Max: 37.4 (05 May 2024 07:42)  T(F): 99.3 (05 May 2024 07:42), Max: 99.3 (05 May 2024 07:42)  HR: 104 (05 May 2024 10:00) (90 - 114)  BP: 123/56 (05 May 2024 10:00) (114/57 - 199/97)  BP(mean): 81 (05 May 2024 10:00) (78 - 133)  ABP: --  ABP(mean): --  RR: 20 (05 May 2024 10:00) (19 - 29)  SpO2: 100% (05 May 2024 10:00) (92% - 100%)    O2 Parameters below as of 05 May 2024 08:15  Patient On (Oxygen Delivery Method): room air              05-04-24 @ 07:01  -  05-05-24 @ 07:00  --------------------------------------------------------  IN: 1830 mL / OUT: 750 mL / NET: 1080 mL    05-05-24 @ 07:01  -  05-05-24 @ 11:38  --------------------------------------------------------  IN: 50 mL / OUT: 0 mL / NET: 50 mL        CAPILLARY BLOOD GLUCOSE      POCT Blood Glucose.: 282 mg/dL (05 May 2024 11:35)      I&O's Summary    04 May 2024 07:01  -  05 May 2024 07:00  --------------------------------------------------------  IN: 1830 mL / OUT: 750 mL / NET: 1080 mL    05 May 2024 07:01  -  05 May 2024 11:38  --------------------------------------------------------  IN: 50 mL / OUT: 0 mL / NET: 50 mL        Physical Exam:   Gen: Awake, tired-appearing. Laying in bed. Intermittent wet cough.  Neuro: More awake today. +R-sided hemiparesis in upper > lower extremities.  +aphasia and dysarthria. PERRLA.   HEENT: +NGT. NC/AT, +L cataract  Resp: +scattered expiratory wheezing. No accessory muscle use  CVS: RRR. +S1/S2. No murmurs  Abd: Soft. NT/ND. +BS  Ext: +RUE erythema improved from yesterday, no tenderness. No LE edema. +Chronic R big toe wound w/ associated desquamation of skin, no erythema or drainage      Meds:  ciprofloxacin   IVPB IV Intermittent    aMIOdarone    Tablet Oral  aMIOdarone    Tablet Oral  midodrine Oral    atorvastatin Oral  insulin glargine Injectable (LANTUS) SubCutaneous  insulin lispro (ADMELOG) corrective regimen sliding scale SubCutaneous    albuterol/ipratropium for Nebulization Nebulizer  buDESOnide    Inhalation Suspension Inhalation    acetaminophen   Oral Liquid .. Oral PRN      apixaban Oral  aspirin  chewable Enteral Tube                                        12.1   10.20 )-----------( 266      ( 05 May 2024 05:10 )             38.9       05-05    144  |  109<H>  |  29<H>  ----------------------------<  304<H>  3.8   |  29  |  1.80<H>    Ca    9.3      05 May 2024 05:10  Phos  2.6     05-05  Mg     2.3     05-05    TPro  6.2  /  Alb  2.4<L>  /  TBili  0.5  /  DBili  x   /  AST  19  /  ALT  21  /  AlkPhos  95  05-04            Urinalysis Basic - ( 05 May 2024 05:10 )    Color: x / Appearance: x / SG: x / pH: x  Gluc: 304 mg/dL / Ketone: x  / Bili: x / Urobili: x   Blood: x / Protein: x / Nitrite: x   Leuk Esterase: x / RBC: x / WBC x   Sq Epi: x / Non Sq Epi: x / Bacteria: x      Clean Catch Clean Catch (Midstream)   No growth -- 05-01 @ 01:15  .Blood Blood-Peripheral   No growth at 4 days -- 04-30 @ 23:50  .Blood Blood-Peripheral   No growth at 4 days -- 04-30 @ 23:45      Tubes/Lines: +Peripheral IV      GLOBAL ISSUE/BEST PRACTICE:  Analgesia: N  Sedation: N  HOB elevation: Y  Stress ulcer prophylaxis: N  VTE prophylaxis: Y  Glycemic control: Y ([facility], 15U lantus)  Nutrition: Y (Pureed, moderately thickened liquids)    CODE STATUS: Full Code

## 2024-05-05 NOTE — PROGRESS NOTE ADULT - ATTENDING COMMENTS
84 yo man with Hx eosinophilic asthma/COPD, chronic hypoxemic and hypercapnic respiratory failure, afib on eliquis, CAD, DM2, L femur Fx with chronic osteo, admitted with uncontrolled afib, course complicated by acute L MCA CVA resulting in R hemiparesis and dysarthria.  Not a TNK candidate as he was on full AC. Pulled out NGT today.    Neuro: posterior L MCA CVA with multiple smaller acute b/l cerebral and cerebellar strokes. Continue eliquis and statin. Neurological status is better today.  PT/OT/speech/swallow evals  CV: afib controlled on amio, continue eliquis. LUE is warm to touch on Inner aspect of antecubital fossa, but LUE duplex negative for clot.   Respiratory: asthma, continue inhaled steroids, bronchodilators. S/p nucala injection 5/3 (wife brought in). Chronic hypercapnic respiratory failure, not hypercapnic this morning, but did not get BiPAP overnight due to concern for secretion retention in mouth. Much riskier currently. Will continue to monitor blood gases prn.  Renal: CKD stable  GI: dysphagia. NGT out today. Attempted very slow feedings which he passed at bedside. Moderately thickened, pureed diet ordered. Speech therapy will monitor.   ID: chronic LE osteo, continue cipro.   Endo: DM2. Continues to be hyperglycemic. Increased Lantus to 15 units qhs, sliding scale.   Ethics: Full code.  Discussed with wife at bedside.     Patient with much improved secretions. Downgraded to telemetry floor.

## 2024-05-05 NOTE — PROGRESS NOTE ADULT - ASSESSMENT
83-year-old  black male with history of COPD coronary artery disease hypertension diabetes hyperlipidemia atrial fibrillation on Eliquis as well as osteomyelitis who presents now to the emergency department at [facility] complaining of rapid heart rate    jacy  copd  AF  OP  OA  CAD  HTN  DM  HLD  OM hx  CVA     on TF - swallow studies noted  ABG noted  s/p Nucala for Asthma - as per home rx regimen  CVA tx in progress - CT head repeat noted -     follows with Dr Ryland ashley med  uses NIV - BIPAP night time for JACY - sleep hygiene reviewed  cardio eval - [facility] rx regimen  BP control  DM care  AF rate and rhythm control  TFT  outpatient record reviewed  proventil PRN - Singulair - copd  spoke with WIFE  on emp ABX   wound care

## 2024-05-05 NOTE — PROGRESS NOTE ADULT - SUBJECTIVE AND OBJECTIVE BOX
CHIEF COMPLAINT/INTERVAL HISTORY:  Pt. seen and evaluated for CVA.  Pt. is in no distress.  Awake and responsive.  Denies having CP or SOB.  Continues to have right sided paralysis.      REVIEW OF SYSTEMS:  No fever, CP, SOB, or abdominal pain    Vital Signs Last 24 Hrs  T(C): 37.4 (05 May 2024 07:42), Max: 37.4 (05 May 2024 07:42)  T(F): 99.3 (05 May 2024 07:42), Max: 99.3 (05 May 2024 07:42)  HR: 113 (05 May 2024 06:00) (90 - 114)  BP: 160/68 (05 May 2024 06:00) (108/55 - 199/97)  BP(mean): 98 (05 May 2024 06:00) (79 - 133)  RR: 24 (05 May 2024 06:00) (19 - 29)  SpO2: 99% (05 May 2024 06:00) (91% - 100%)    Parameters below as of 05 May 2024 00:30  Patient On (Oxygen Delivery Method): room air        PHYSICAL EXAM:  GENERAL: NAD  HEENT:  hearing normal, conjunctiva and sclera clear, +NGT  Chest: diminished BS at bases, no wheezing  CV: S1S2, RRR,   GI: soft, +BS, NT/ND  Musculoskeletal: no LE edema  Neuro: word finding difficulties, right sided paralysis  Psychiatric: calm  Skin: warm and dry    LABS:                        12.1   10.20 )-----------( 266      ( 05 May 2024 05:10 )             38.9     05-05    144  |  109<H>  |  29<H>  ----------------------------<  304<H>  3.8   |  29  |  1.80<H>    Ca    9.3      05 May 2024 05:10  Phos  2.6     05-05  Mg     2.3     05-05    TPro  6.2  /  Alb  2.4<L>  /  TBili  0.5  /  DBili  x   /  AST  19  /  ALT  21  /  AlkPhos  95  05-04      Urinalysis Basic - ( 05 May 2024 05:10 )    Color: x / Appearance: x / SG: x / pH: x  Gluc: 304 mg/dL / Ketone: x  / Bili: x / Urobili: x   Blood: x / Protein: x / Nitrite: x   Leuk Esterase: x / RBC: x / WBC x   Sq Epi: x / Non Sq Epi: x / Bacteria: x

## 2024-05-05 NOTE — PROGRESS NOTE ADULT - SUBJECTIVE AND OBJECTIVE BOX
[facility] -- Saud Aguilar, Génesis Louise, [place], Kumar Hodges  Office # [phone]      Follow Up:  CAD, AF, CVA     Subjective/Observations: Patient seen and examined. Events noted. Resting  in bed. More alert today No complaints of chest pain, dyspnea, or palpitations reported. No signs of orthopnea or PND.       REVIEW OF SYSTEMS: All other review of systems is negative unless indicated above    PAST MEDICAL & SURGICAL HISTORY:  Diabetes Mellitus, Type II      CAD (Coronary Artery Disease)  s/p 3v CABG 2004; stents placed in [place] in 2019      Dyslipidemia      Osteomyelitis      COPD (chronic obstructive pulmonary disease)  on 2L at home and BiPAP at night; intubated 6/18      Hypertension      PVD (peripheral vascular disease)      History of PAT (paroxysmal atrial tachycardia)      Asthma with COPD      BPH (benign prostatic hyperplasia)      Acute osteomyelitis      CABG (Coronary Artery Bypass Graft)  2004      Compound fracture  left leg      S/P primary angioplasty with coronary stent      H/O drainage of abscess  Left femur 12/2021          MEDICATIONS  (STANDING):  albuterol/ipratropium for Nebulization 3 milliLiter(s) Nebulizer every 6 hours  aMIOdarone    Tablet 400  Oral   aMIOdarone    Tablet 200 milliGRAM(s) Oral daily  apixaban 2.5 milliGRAM(s) Oral every 12 hours  aspirin  chewable 81 milliGRAM(s) Enteral Tube daily  atorvastatin 80 milliGRAM(s) Oral at bedtime  buDESOnide    Inhalation Suspension 0.5 milliGRAM(s) Inhalation two times a day  ciprofloxacin   IVPB 400 milliGRAM(s) IV Intermittent every 12 hours  insulin glargine Injectable (LANTUS) 15 Unit(s) SubCutaneous at bedtime  insulin lispro (ADMELOG) corrective regimen sliding scale   SubCutaneous every 6 hours  midodrine 10 milliGRAM(s) Oral every 8 hours    MEDICATIONS  (PRN):  acetaminophen   Oral Liquid .. 650 milliGRAM(s) Oral every 6 hours PRN Temp greater or equal to 38.5C (101.3F)      Allergies    No Known Allergies    Intolerances            Vital Signs Last 24 Hrs  T(C): 37.2 (05 May 2024 11:55), Max: 37.4 (05 May 2024 07:42)  T(F): 99 (05 May 2024 11:55), Max: 99.3 (05 May 2024 07:42)  HR: 102 (05 May 2024 12:00) (90 - 114)  BP: 131/58 (05 May 2024 12:00) (114/57 - 199/97)  BP(mean): 84 (05 May 2024 12:00) (78 - 133)  RR: 21 (05 May 2024 12:00) (19 - 29)  SpO2: 100% (05 May 2024 12:00) (92% - 100%)    Parameters below as of 05 May 2024 08:15  Patient On (Oxygen Delivery Method): room air        I&O's Summary    04 May 2024 07:01  -  05 May 2024 07:00  --------------------------------------------------------  IN: 1830 mL / OUT: 750 mL / NET: 1080 mL    05 May 2024 07:01  -  05 May 2024 12:16  --------------------------------------------------------  IN: 90 mL / OUT: 0 mL / NET: 90 mL          PHYSICAL EXAM:  TELE: SR - -130  Constitutional: NAD, awake  HEENT: Moist Mucous Membranes, Anicteric NGT  Pulmonary: Decreased breath sounds b/l. coarse sounds   Cardiovascular: Regular, S1 and S2, + SM  Gastrointestinal: Bowel Sounds present, soft, nontender.   Lymph: + peripheral edema. No lymphadenopathy.  Skin: No visible rashes or ulcers.  Psych:  unable to fully assess     LABS: All Labs Reviewed:                        12.1   10.20 )-----------( 266      ( 05 May 2024 05:10 )             38.9                         11.8   10.40 )-----------( 223      ( 04 May 2024 05:25 )             38.5                         12.6   12.12 )-----------( 227      ( 03 May 2024 05:15 )             40.5     05 May 2024 05:10    144    |  109    |  29     ----------------------------<  304    3.8     |  29     |  1.80   04 May 2024 05:25    144    |  109    |  27     ----------------------------<  236    4.0     |  27     |  1.80   03 May 2024 05:15    142    |  107    |  23     ----------------------------<  225    3.9     |  26     |  1.70     Ca    9.3        05 May 2024 05:10  Ca    9.5        04 May 2024 05:25  Ca    9.4        03 May 2024 05:15  Phos  2.6       05 May 2024 05:10  Phos  2.2       04 May 2024 05:25  Phos  2.6       03 May 2024 05:15  Mg     2.3       05 May 2024 05:10  Mg     2.2       04 May 2024 05:25  Mg     2.0       03 May 2024 05:15    TPro  6.2    /  Alb  2.4    /  TBili  0.5    /  DBili  x      /  AST  19     /  ALT  21     /  AlkPhos  95     04 May 2024 05:25        - Troponin:

## 2024-05-05 NOTE — PROGRESS NOTE ADULT - CRITICAL CARE ATTENDING COMMENT
Upon my evaluation, this patient is at high risk for imminent or life threatening deterioration due to AF, stroke, hypotension  and other active medical issues which require my direct attention, intervention, and personal management.  I have personally spent >30 minutes  of critical care time exclusive of time spent on separate billing procedures. This includes review of laboratory data, radiology results, discussion with primary team\patient, and monitoring for potential decompensation. Interventions were performed as documented above.
Upon my evaluation, this patient is at high risk for imminent or life threatening deterioration due to AF, stroke and other active medical issues which require my direct attention, intervention, and personal management.  I have personally spent >30 minutes  of critical care time exclusive of time spent on separate billing procedures. This includes review of laboratory data, radiology results, discussion with primary team\patient, and monitoring for potential decompensation. Interventions were performed as documented above.
Upon my evaluation, this patient is at high risk for imminent or life threatening deterioration due to AF, stroke, hypotension  and other active medical issues which require my direct attention, intervention, and personal management.  I have personally spent >30 minutes  of critical care time exclusive of time spent on separate billing procedures. This includes review of laboratory data, radiology results, discussion with primary team\patient, and monitoring for potential decompensation. Interventions were performed as documented above.
Upon my evaluation, this patient is at high risk for imminent or life threatening deterioration due to AF, stroke, hypotension  and other active medical issues which require my direct attention, intervention, and personal management.  I have personally spent >30 minutes  of critical care time exclusive of time spent on separate billing procedures. This includes review of laboratory data, radiology results, discussion with primary team\patient, and monitoring for potential decompensation. Interventions were performed as documented above.

## 2024-05-05 NOTE — PROGRESS NOTE ADULT - ASSESSMENT
83yoM PMHx AFib on Eliquis, CAD, HTN, DM, HLD, COPD, osteomyelitis presents c/o shortness of breath, chest discomfort. Admitted for AFib w/ RVR. Hospital course c/b CVA on 4/27/2024. 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      Problem/Plan - 1:  ·  Problem: Cerebrovascular accident (CVA).   ·  Plan: - MRI Head: Multiple acute infarcts scattered throughout the bilateral cerebral and cerebellar hemispheres. More dominant acute to subacute infarction in the posterior left MCA vascular distribution. Chronic small vessel disease.  - No evidence of hemorrhagic conversion -repeat head CT from May 1 with no acute findings  - Continue Eliquis 2.5mg via NGT Q12h  - Weaned off phenylephrine  - Continue aspirin 81mg via NGT daily and Lipitor 80mg via NGT QHS  - Neuro checks per protocol  - Diet was advanced but then reverted back to NPO due to excess secretions and poor tolerance of po diet  - s/p MBS  - Fall and aspiration precautions   - Managment per ICU     Problem/Plan - 2:  ·  Problem: Atrial fibrillation with RVR.   ·  Plan: History of Afib, on home Metoprolol BID and Eliquis   - TTE 4/26/2024: LV no well visualized however LV probably normal based on limited views, moderate thickening of aortic valve leaflets, trace TR, dilated IVC with normal inspiratory collapse, normal pulmonary artery pressure  - Resumed AC  - Continue po amiodarone  - Telemetry monitoring, electrolyte monitoring  - Plan per ICU.     Problem/Plan - 3:  ·  Problem: Elevated troponin.   ·  Plan: Elevated trop on admission likely 2/2 Afib w/ RVR   - TTE 4/26/2024: technically difficult study, LV not well visualized however LV probably normal based on limited views, moderate thickening of aortic valve leaflets, trace TR, dilated IVC with normal inspiratory collapse, normal pulmonary artery pressure  - Trop x2 both sets elevated but downtrended   - No complaints of chest pain, low suspicions of ACS --> monitor for signs and symptoms of ischemia   - Cardio following  - Plan per ICU.     Problem/Plan - 4:  ·  Problem: Open toe wound.   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     Problem/Plan - 5:  ·  Problem: MERRILL (acute kidney injury).   ·  Plan: MERRILL on admission. Baseline Cr around 1.1  - On admission Cr 1.6   - Monitor volume status closely  - Avoid nephrotoxics - hold home furosemide for now  - Renal indices have been stable.     Problem/Plan - 6:  ·  Problem: CAD (coronary artery disease).   ·  Plan: Chronic  - continue asa/statin  - Lipid panel within normal  - Plan per ICU.     Problem/Plan - 7:  ·  Problem: HTN (hypertension).   ·  Plan: Chronic  - DC phenylephrine.  On midodrine 10mg PO Q8h  - Lopressor initially discontinued to allow for permissive HTN  - Monitor routine hemodynamics  - Plan per ICU.     Problem/Plan - 8:  ·  Problem: Type 2 diabetes mellitus.   ·  Plan: Chronic, on home insulin (Lantus 10u)  - NPO + Glucerna tube feeds  - cotninue Lantus 10 units sQ QHS  - moderate dose sliding scale  - hypoglycemia protocol in place, fingersticks q ac, q hs   - goal -180 in hospital setting, adjust insulin regimen prn  - HbA1c 6.8  - Plan per ICU.     Problem/Plan - 9:  ·  Problem: COPD (chronic obstructive pulmonary disease).   ·  Plan: Chronic, baseline 2L home O2 and BiPAP qHS   - Hold home montelukast as NPO  - Continue Bipap overnight   - Continue Pulmicort inh BID and Duoneb tx Q6h  - Pulm Dr. Combs consulted  - Plan per ICU.     Problem/Plan - 10:  ·  Problem: Need for prophylactic measure.   ·  Plan; #VTE ppx: Eliquis  #Diet: Diet, NPO with Tube Feed: Tube Feeding Modality: Nasogastric Glucerna 1.5  #Activity: Activity - Increase As Tolerated:   Time/Priority:  Routine (04-27-24 @ 22:51) [Active]  #ACP: Full code  #Dispo: further plans pending clinical course.

## 2024-05-05 NOTE — PROGRESS NOTE ADULT - ASSESSMENT
83 year old male with a PMH of eosinophilic asthma/COPD, former smoker, chronic hypoxemic and hypercapnic respiratory failure on nocturnal BiPAP, afib on Eliquis, CAD s/p CABG and PCI, HTN, HLD, DM2, L femur fracture with chronic OM who presented to the ED from home with complaints of SOB and tachycardia.      Neuro:   - s/p L MCA ischemic stroke w/ b/l embolic infarcts with no hemorrhagic conversion  - Repeat CTH w/ redemonstration of multipile evolving infarcts  - c/w ASA and statin  - pulled out NGT this AM, passed bedside dysphagia  - c/w PT/OT    Cardio:  - Afib, c/w PO Amio and eliquis  - c/w midodrine q8h for weaning of pressure support    Pulm:   - Concern for aspiration 2/2 poor secretion management, continue to monitor  - Hx COPD, COURTNEY, assess nightly for use of nocturnal BiPAP given secretions  - Standing duonebs, budesonide  - S/p nucala injection    GI:  - Pt pulled out NGT this AM, passed bedside dysphagia  - Pureed diet w/ moderately thickened liquids    Renal:   - No acute needs    ID:   - Concern for aspiration 2/2 poor secretion management and intermittent fevers  - Remains afebrile, negative cultures. Continue to monitor off abx  - c/w home cipro for suppression of chronic osteomyelitis  - Local wound care for chronic R big toe wound     Endocrine:   - T2DM, glucoses remain above goal, can be reactive or 2/2 d5 in cipro  - Increase lantus to 15U  - c/w MDISS    Heme:   - DVT PPx: Eliquis 2.5mg BID based off age and CrCl    #GOC: Full Code    Dispo: Stable for transfer to floors

## 2024-05-05 NOTE — PROGRESS NOTE ADULT - ASSESSMENT
83-year-old M with history of COPD on home O2 PRN, coronary artery disease s/p CABG,  hypertension, diabetes, hyperlipidemia, atrial fibrillation on Eliquis as well as chronic osteomyelitis who presents to the emergency department at [facility] complaining of rapid heart rate. Cardiology consulted for afib with rvr.    - Presenting with PAF with RVR  - At home he had noted some SOB, his wife checked his HR and noted it up was up to 160s so brought him to the ER, then placed on Cardizem gtt, had missed home BB   - on the morning of 4/27, patient was noted to have new onset aphasia and a code stroke was called.  He was deemed not a candidate for TNK as he is on AC.  He was found to have a L M3 occlusion but was deemed not a candidate for thrombectomy as occlusion too distal.  Later that night, a RRT was called due to worsening NIH score noted by nursing.  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     - Pressors stopped as MAP now improved on midodrine. will avoid antiHTN meds for now.   - back in NSR. Currently on PO Amiodarone loading.   - Now back on Eliquis 2.5mg BID. Dose reduced in the setting of renal function and age. Previous Cr of 1.4, if his renal function goes back to baseline of 1.4 would place on full dose Eliquis for CVA protection  - if bp still labile and has room would start on Loprssor 25mg q12.   - tele with ST up to 130s which is likely related to agitation.   - cont ASA as per Neuro  - cont high intensity statin  - Continue telemetry  - Monitor and replete electrolytes. Keep K>4.0 and Mg>2.0.

## 2024-05-05 NOTE — PROGRESS NOTE ADULT - SUBJECTIVE AND OBJECTIVE BOX
Date/Time Patient Seen:  		  Referring MD:   Data Reviewed	       Patient is a 83y old  Male who presents with a chief complaint of AFib w/ RVR (04 May 2024 12:05)      Subjective/HPI     PAST MEDICAL & SURGICAL HISTORY:  Diabetes Mellitus, Type II    CAD (Coronary Artery Disease)  s/p 3v CABG 2004; stents placed in [place] in 2019    Dyslipidemia    Asymptomatic Peripheral Vascular Disease    Osteomyelitis    COPD (chronic obstructive pulmonary disease)  on 2L at home and BiPAP at night; intubated 6/18    Diabetes mellitus    Hypertension    PVD (peripheral vascular disease)    History of PAT (paroxysmal atrial tachycardia)    Asthma with COPD    BPH (benign prostatic hyperplasia)    Acute osteomyelitis    CABG (Coronary Artery Bypass Graft)  2004    Compound fracture  left leg    S/P primary angioplasty with coronary stent    H/O drainage of abscess  Left femur 12/2021          Medication list         MEDICATIONS  (STANDING):  albuterol/ipratropium for Nebulization 3 milliLiter(s) Nebulizer every 6 hours  aMIOdarone    Tablet 400  Oral   aMIOdarone    Tablet 200 milliGRAM(s) Oral daily  apixaban 2.5 milliGRAM(s) Oral every 12 hours  aspirin  chewable 81 milliGRAM(s) Enteral Tube daily  atorvastatin 80 milliGRAM(s) Oral at bedtime  buDESOnide    Inhalation Suspension 0.5 milliGRAM(s) Inhalation two times a day  ciprofloxacin   IVPB 400 milliGRAM(s) IV Intermittent every 12 hours  insulin glargine Injectable (LANTUS) 10 Unit(s) SubCutaneous at bedtime  insulin lispro (ADMELOG) corrective regimen sliding scale   SubCutaneous every 6 hours  midodrine 10 milliGRAM(s) Oral every 8 hours    MEDICATIONS  (PRN):  acetaminophen   Oral Liquid .. 650 milliGRAM(s) Oral every 6 hours PRN Temp greater or equal to 38.5C (101.3F)         Vitals log        ICU Vital Signs Last 24 Hrs  T(C): 37.1 (05 May 2024 04:00), Max: 37.3 (04 May 2024 08:10)  T(F): 98.7 (05 May 2024 04:00), Max: 99.1 (04 May 2024 08:10)  HR: 113 (05 May 2024 06:00) (90 - 114)  BP: 160/68 (05 May 2024 06:00) (108/55 - 199/97)  BP(mean): 98 (05 May 2024 06:00) (79 - 133)  ABP: --  ABP(mean): --  RR: 24 (05 May 2024 06:00) (19 - 29)  SpO2: 99% (05 May 2024 06:00) (91% - 100%)    O2 Parameters below as of 05 May 2024 00:30  Patient On (Oxygen Delivery Method): room air                 Input and Output:  I&O's Detail    04 May 2024 07:01  -  05 May 2024 07:00  --------------------------------------------------------  IN:    Free Water: 160 mL    Glucerna 1.5: 1145 mL    IV PiggyBack: 250 mL    IV PiggyBack: 200 mL  Total IN: 1755 mL    OUT:    Incontinent per Condom Catheter (mL): 750 mL  Total OUT: 750 mL    Total NET: 1005 mL          Lab Data                        12.1   10.20 )-----------( 266      ( 05 May 2024 05:10 )             38.9     05-05    144  |  109<H>  |  29<H>  ----------------------------<  304<H>  3.8   |  29  |  1.80<H>    Ca    9.3      05 May 2024 05:10  Phos  2.6     05-05  Mg     2.3     05-05    TPro  6.2  /  Alb  2.4<L>  /  TBili  0.5  /  DBili  x   /  AST  19  /  ALT  21  /  AlkPhos  95  05-04    ABG - ( 04 May 2024 05:11 )  pH, Arterial: 7.40  pH, Blood: x     /  pCO2: 43    /  pO2: 75    / HCO3: 27    / Base Excess: 1.8   /  SaO2: 96.7                    Review of Systems	      Objective     Physical Examination    heart s1s2  lung dc BS  head nc      Pertinent Lab findings & Imaging      Eliecer:  NO   Adequate UO     I&O's Detail    04 May 2024 07:01  -  05 May 2024 07:00  --------------------------------------------------------  IN:    Free Water: 160 mL    Glucerna 1.5: 1145 mL    IV PiggyBack: 250 mL    IV PiggyBack: 200 mL  Total IN: 1755 mL    OUT:    Incontinent per Condom Catheter (mL): 750 mL  Total OUT: 750 mL    Total NET: 1005 mL               Discussed with:     Cultures:	        Radiology                             Local [facility] pharmacy could not fill Albustix. Sent prescription to [facility]. Called Dad to let him know this.

## 2024-05-06 NOTE — PROGRESS NOTE ADULT - SUBJECTIVE AND OBJECTIVE BOX
[facility] -- Génesis Villavicencio Pannella, Patel, Carolina Heath Cohen: Office # [phone]    Follow Up:  CAD, AF, CVA     Subjective/Observations: Patient seen and examined. Events noted. Resting  in bed. More alert today No complaints of chest pain, dyspnea, or palpitations reported. No signs of orthopnea or PND.     REVIEW OF SYSTEMS: All other review of systems are negative unless indicated above    PAST MEDICAL & SURGICAL HISTORY:  Diabetes Mellitus, Type II      CAD (Coronary Artery Disease)  s/p 3v CABG 2004; stents placed in [place] in 2019      Dyslipidemia      Osteomyelitis      COPD (chronic obstructive pulmonary disease)  on 2L at home and BiPAP at night; intubated 6/18      Hypertension      Hypertension      PVD (peripheral vascular disease)      History of PAT (paroxysmal atrial tachycardia)      Asthma with COPD      BPH (benign prostatic hyperplasia)      Acute osteomyelitis      CABG (Coronary Artery Bypass Graft)  2004      Compound fracture  left leg      S/P primary angioplasty with coronary stent      H/O drainage of abscess  Left femur 12/2021          MEDICATIONS  (STANDING):  albuterol/ipratropium for Nebulization 3 milliLiter(s) Nebulizer every 6 hours  aMIOdarone    Tablet 400  Oral   aMIOdarone    Tablet 200 milliGRAM(s) Oral daily  apixaban 2.5 milliGRAM(s) Oral every 12 hours  aspirin  chewable 81 milliGRAM(s) Enteral Tube daily  atorvastatin 80 milliGRAM(s) Oral at bedtime  buDESOnide    Inhalation Suspension 0.5 milliGRAM(s) Inhalation two times a day  ciprofloxacin   IVPB 400 milliGRAM(s) IV Intermittent every 12 hours  dextrose 10% Bolus 125 milliLiter(s) IV Bolus once  dextrose 5%. 1000 milliLiter(s) (60 mL/Hr) IV Continuous <Continuous>  dextrose 5%. 1000 milliLiter(s) (100 mL/Hr) IV Continuous <Continuous>  dextrose 5%. 1000 milliLiter(s) (50 mL/Hr) IV Continuous <Continuous>  dextrose 50% Injectable 25 Gram(s) IV Push once  dextrose 50% Injectable 12.5 Gram(s) IV Push once  glucagon  Injectable 1 milliGRAM(s) IntraMuscular once  insulin glargine Injectable (LANTUS) 15 Unit(s) SubCutaneous at bedtime  insulin lispro (ADMELOG) corrective regimen sliding scale   SubCutaneous at bedtime  insulin lispro (ADMELOG) corrective regimen sliding scale   SubCutaneous three times a day before meals  midodrine 5 milliGRAM(s) Oral every 8 hours  montelukast 10 milliGRAM(s) Oral daily    MEDICATIONS  (PRN):  acetaminophen   Oral Liquid .. 650 milliGRAM(s) Oral every 6 hours PRN Temp greater or equal to 38.5C (101.3F)  dextrose Oral Gel 15 Gram(s) Oral once PRN Blood Glucose LESS THAN 70 milliGRAM(s)/deciliter    Allergies    No Known Allergies    Intolerances      Vital Signs Last 24 Hrs  T(C): 36.5 (06 May 2024 04:52), Max: 37.2 (05 May 2024 11:55)  T(F): 97.7 (06 May 2024 04:52), Max: 99 (05 May 2024 11:55)  HR: 93 (06 May 2024 04:52) (87 - 104)  BP: 158/80 (06 May 2024 04:52) (113/53 - 158/80)  BP(mean): 92 (05 May 2024 21:00) (77 - 100)  RR: 16 (06 May 2024 04:52) (16 - 29)  SpO2: 92% (06 May 2024 04:52) (92% - 100%)    Parameters below as of 06 May 2024 04:52  Patient On (Oxygen Delivery Method): room air      I&O's Summary    05 May 2024 07:01  -  06 May 2024 07:00  --------------------------------------------------------  IN: 580 mL / OUT: 700 mL / NET: -120 mL          TELE: SR 80-100s   PHYSICAL EXAM:  Constitutional: NAD, awake and alert  HEENT: Moist Mucous Membranes, Anicteric  Pulmonary: Non-labored, breath sounds are clear bilaterally, Diminished at bases No wheezing, rales or rhonchi  Cardiovascular: Regular, S1 and S2, + murmurs, No rubs, gallops or clicks  Gastrointestinal:  soft, nontender, nondistended   Lymph: +1 peripheral edema. No lymphadenopathy.   Skin: No visible rashes or ulcers.  Psych:  Mood & affect appropriate      LABS: All Labs Reviewed:                        12.3   8.13  )-----------( 274      ( 06 May 2024 06:18 )             40.2                         12.1   10.20 )-----------( 266      ( 05 May 2024 05:10 )             38.9                         11.8   10.40 )-----------( 223      ( 04 May 2024 05:25 )             38.5     06 May 2024 06:18    147    |  113    |  25     ----------------------------<  188    4.2     |  25     |  1.60   05 May 2024 05:10    144    |  109    |  29     ----------------------------<  304    3.8     |  29     |  1.80   04 May 2024 05:25    144    |  109    |  27     ----------------------------<  236    4.0     |  27     |  1.80     Ca    9.8        06 May 2024 06:18  Ca    9.3        05 May 2024 05:10  Ca    9.5        04 May 2024 05:25  Phos  2.9       06 May 2024 06:18  Phos  2.6       05 May 2024 05:10  Phos  2.2       04 May 2024 05:25  Mg     2.1       06 May 2024 06:18  Mg     2.3       05 May 2024 05:10  Mg     2.2       04 May 2024 05:25    TPro  6.2    /  Alb  2.4    /  TBili  0.5    /  DBili  x      /  AST  19     /  ALT  21     /  AlkPhos  95     04 May 2024 05:25     Troponin I, High Sensitivity Result: 617.0 ng/L (04-27-24 @ 14:47)  Creatine Kinase, Serum: 63 U/L (04-27-24 @ 10:38)  Troponin I, High Sensitivity Result: 733.5 ng/L (04-27-24 @ 10:38)  Triiodothyronine, Total (T3 Total): 112 ng/dL (04-25-24 @ 15:45)  Cholesterol: 124 mg/dL (04-26-24 @ 08:05)  HDL Cholesterol: 48 mg/dL (04-26-24 @ 08:05)  Triglycerides, Serum: 93 mg/dL (04-26-24 @ 08:05)    12 Lead ECG:   Ventricular Rate 95 BPM    Atrial Rate 95 BPM    P-R Interval 136 ms    QRS Duration 86 ms    Q-T Interval 352 ms    QTC Calculation(Bazett) 442 ms    P Axis 84 degrees    R Axis 88 degrees    T Axis 58 degrees    Diagnosis Line Normal sinus rhythm  Low voltage QRS  possible BREANNA  Borderline ECG    Confirmed by Cookie Ordaz ([id number]) on 5/2/2024 2:38:25 PM (05-02-24 @ 09:49)      TRANSTHORACIC ECHOCARDIOGRAM REPORT  ________________________________________________________________________________                                      _______       Pt. Name:       YUE COTTO Study Date:    4/26/2024  MRN:            [id number]            YOB: 1940  Accession #:    [id number]           Age:           83 years  Account#:       [id number]          Gender:        M  Visit ID#  Heart Rate:                         Height:        70.08 in (178.00 cm)  Rhythm:                            Weight:        220.46 lb (100.00 kg)  Blood Pressure: 158/69 mmHg         BSA/BMI:       2.18 m² / 31.56 kg/m²  ________________________________________________________________________________________  Referring Physician:   [id number] Marc Grossman  Interpreting Physician: Cookie Ordaz MD  Primary Sonographer:    Clayton Wilkinson    CPT:               ECHO TTE WO CON COMP W DOPP - 47746.m  Indication(s):     Unspecified atrial fibrillation - I48.91  Procedure:         Transthoracic echocardiogram with 2-D, M-mode and complete                     spectral and color flow Doppler.  Ordering Location: [facility]  Admission Status:  Inpatient  Study Information: Image quality for this study is technically difficult.    _______________________________________________________________________________________     CONCLUSIONS:      1. Technically difficult image quality.   2. The LV is not well visualized, however the LV function is probably normal based on limited views.   3. The right ventricle is not well visualized. probably normal systolic function.   4. There is moderate thickening of the aortic valve leaflets.   5. Structurally normal mitral valve with normal leaflet excursion.   6. Trace tricuspid regurgitation.   7. The inferior vena cava is dilated measuring 2.18 cm in diameter, (dilated >2.1cm) with normal inspiratory collapse (normal >50%) consistent with mildly elevated right atrial pressure (~8, range 5-10mmHg).   8. Estimated pulmonary artery systolic pressure is 26 mmHg, consistent with normal pulmonary artery pressure.    ________________________________________________________________________________________  FINDINGS:     Left Ventricle:  There is normal left ventricular diastolic function. Left ventricular endocardium is not well visualized.     Right Ventricle:  The right ventricle is not well visualized. Probably normal systolic function.     Left Atrium:  The left atrium is normal in size with an indexed volume of 16.30 ml/m².     Right Atrium:  The right atrium is normal in size.     Aortic Valve:  The aortic valve anatomy cannot be determined with normal systolic excursion. There is mild calcification of the aortic valve leaflets. There is moderate thickening of the aortic valve leaflets.     Mitral Valve:  Structurally normal mitral valve with normal leaflet excursion. There is trace mitral regurgitation.     Tricuspid Valve:  The tricuspid valve was not well visualized. There is trace tricuspid regurgitation. Estimated pulmonary artery systolic pressure is 26 mmHg, consistent with normal pulmonary artery pressure.     Pulmonic Valve:  The pulmonic valve was not well visualized.     Aorta:  The aortic root at the sinuses of Valsalva is normal in size, measuring 3.30 cm (indexed 1.52 cm/m²).     Systemic Veins:  The inferior vena cava is dilated measuring 2.18 cm in diameter, (dilated >2.1cm) with normal inspiratory collapse (normal >50%) consistent with mildly elevated right atrial pressure (~8, range 5-10mmHg).  ____________________________________________________________________  QUANTITATIVE DATA:  Left Ventricle Measurements: (Indexed to BSA)     IVSd (2D):   1.1 cm  LVPWd (2D):  0.9 cm  LVIDd (2D):  4.0 cm  LVIDs (2D):  2.8 cm  LV Mass:     129 g  59.1 g/m²     MVE Vmax:    0.59 m/s  MV A Vmax:    0.85 m/s  MV E/A:       0.69  e' lateral:   6.96 cm/s  e' medial:    5.98 cm/s  E/e' lateral: 8.43  E/e' medial:  9.82  E/e' Average: 9.07  MV DT:        195 msec    Aorta Measurements: (Normal range) (Indexed to BSA)     Sinuses of Valsalva: 3.30 cm (3.1 - 3.7 cm)       Left Atrium Measurements: (Indexed to BSA)  LA Diam 2D: 3.30 cm       LVOT / RVOT/ Qp/Qs Data: (Indexed to BSA)  LVOT Diameter: 1.80 cm  LVOT Area:     2.54 cm²    Mitral Valve Measurements:     MV E Vmax: 0.6 m/s  MV A Vmax: 0.8 m/s  MV E/A:    0.7       Tricuspid Valve Measurements:     TR Vmax:          2.1 m/s  TR Peak Gradient: 18.1 mmHg  RA Pressure:      8 mmHg  PASP:             26 mmHg    ________________________________________________________________________________________  Electronically signed on 4/26/2024 at 4:46:45 PM by Cookie Ordaz MD         *** Final ***   [facility] -- Génesis Villavicencio Pannella, Patel, Carolina Heath Cohen: Office # [phone]    Follow Up:  CAD, AF, CVA     Subjective/Observations: Patient seen and examined. Events noted. Resting  in bed. More alert today No complaints of chest pain, dyspnea, or palpitations reported. No signs of orthopnea or PND.     REVIEW OF SYSTEMS: All other review of systems are negative unless indicated above    PAST MEDICAL & SURGICAL HISTORY:  Diabetes Mellitus, Type II      CAD (Coronary Artery Disease)  s/p 3v CABG 2004; stents placed in [place] in 2019      Dyslipidemia      Osteomyelitis      COPD (chronic obstructive pulmonary disease)  on 2L at home and BiPAP at night; intubated 6/18      Hypertension      Hypertension      PVD (peripheral vascular disease)      History of PAT (paroxysmal atrial tachycardia)      Asthma with COPD      BPH (benign prostatic hyperplasia)      Acute osteomyelitis      CABG (Coronary Artery Bypass Graft)  2004      Compound fracture  left leg      S/P primary angioplasty with coronary stent      H/O drainage of abscess  Left femur 12/2021          MEDICATIONS  (STANDING):  albuterol/ipratropium for Nebulization 3 milliLiter(s) Nebulizer every 6 hours  aMIOdarone    Tablet 400  Oral   aMIOdarone    Tablet 200 milliGRAM(s) Oral daily  apixaban 2.5 milliGRAM(s) Oral every 12 hours  aspirin  chewable 81 milliGRAM(s) Enteral Tube daily  atorvastatin 80 milliGRAM(s) Oral at bedtime  buDESOnide    Inhalation Suspension 0.5 milliGRAM(s) Inhalation two times a day  ciprofloxacin   IVPB 400 milliGRAM(s) IV Intermittent every 12 hours  dextrose 10% Bolus 125 milliLiter(s) IV Bolus once  dextrose 5%. 1000 milliLiter(s) (60 mL/Hr) IV Continuous <Continuous>  dextrose 5%. 1000 milliLiter(s) (100 mL/Hr) IV Continuous <Continuous>  dextrose 5%. 1000 milliLiter(s) (50 mL/Hr) IV Continuous <Continuous>  dextrose 50% Injectable 25 Gram(s) IV Push once  dextrose 50% Injectable 12.5 Gram(s) IV Push once  glucagon  Injectable 1 milliGRAM(s) IntraMuscular once  insulin glargine Injectable (LANTUS) 15 Unit(s) SubCutaneous at bedtime  insulin lispro (ADMELOG) corrective regimen sliding scale   SubCutaneous at bedtime  insulin lispro (ADMELOG) corrective regimen sliding scale   SubCutaneous three times a day before meals  midodrine 5 milliGRAM(s) Oral every 8 hours  montelukast 10 milliGRAM(s) Oral daily    MEDICATIONS  (PRN):  acetaminophen   Oral Liquid .. 650 milliGRAM(s) Oral every 6 hours PRN Temp greater or equal to 38.5C (101.3F)  dextrose Oral Gel 15 Gram(s) Oral once PRN Blood Glucose LESS THAN 70 milliGRAM(s)/deciliter    Allergies    No Known Allergies    Intolerances      Vital Signs Last 24 Hrs  T(C): 36.5 (06 May 2024 04:52), Max: 37.2 (05 May 2024 11:55)  T(F): 97.7 (06 May 2024 04:52), Max: 99 (05 May 2024 11:55)  HR: 93 (06 May 2024 04:52) (87 - 104)  BP: 158/80 (06 May 2024 04:52) (113/53 - 158/80)  BP(mean): 92 (05 May 2024 21:00) (77 - 100)  RR: 16 (06 May 2024 04:52) (16 - 29)  SpO2: 92% (06 May 2024 04:52) (92% - 100%)    Parameters below as of 06 May 2024 04:52  Patient On (Oxygen Delivery Method): room air      I&O's Summary    05 May 2024 07:01  -  06 May 2024 07:00  --------------------------------------------------------  IN: 580 mL / OUT: 700 mL / NET: -120 mL          TELE: SR 80-100s   PHYSICAL EXAM:  Constitutional: NAD, awake and alert  HEENT: Moist Mucous Membranes, Anicteric  Pulmonary: Non-labored, breath sounds are clear bilaterally, Diminished at bases No wheezing, rales or rhonchi  Cardiovascular: Regular, S1 and S2, + murmurs, No rubs, gallops or clicks  Gastrointestinal:  soft, nontender, nondistended   Lymph: +1 peripheral edema. No lymphadenopathy.   Skin: No visible rashes or ulcers.  Psych:  Mood & affect appropriate      LABS: All Labs Reviewed:                        12.3   8.13  )-----------( 274      ( 06 May 2024 06:18 )             40.2                         12.1   10.20 )-----------( 266      ( 05 May 2024 05:10 )             38.9                         11.8   10.40 )-----------( 223      ( 04 May 2024 05:25 )             38.5     06 May 2024 06:18    147    |  113    |  25     ----------------------------<  188    4.2     |  25     |  1.60   05 May 2024 05:10    144    |  109    |  29     ----------------------------<  304    3.8     |  29     |  1.80   04 May 2024 05:25    144    |  109    |  27     ----------------------------<  236    4.0     |  27     |  1.80     Ca    9.8        06 May 2024 06:18  Ca    9.3        05 May 2024 05:10  Ca    9.5        04 May 2024 05:25  Phos  2.9       06 May 2024 06:18  Phos  2.6       05 May 2024 05:10  Phos  2.2       04 May 2024 05:25  Mg     2.1       06 May 2024 06:18  Mg     2.3       05 May 2024 05:10  Mg     2.2       04 May 2024 05:25    TPro  6.2    /  Alb  2.4    /  TBili  0.5    /  DBili  x      /  AST  19     /  ALT  21     /  AlkPhos  95     04 May 2024 05:25     Troponin I, High Sensitivity Result: 617.0 ng/L (04-27-24 @ 14:47)  Creatine Kinase, Serum: 63 U/L (04-27-24 @ 10:38)  Troponin I, High Sensitivity Result: 733.5 ng/L (04-27-24 @ 10:38)  Triiodothyronine, Total (T3 Total): 112 ng/dL (04-25-24 @ 15:45)  Cholesterol: 124 mg/dL (04-26-24 @ 08:05)  HDL Cholesterol: 48 mg/dL (04-26-24 @ 08:05)  Triglycerides, Serum: 93 mg/dL (04-26-24 @ 08:05)    12 Lead ECG:   Ventricular Rate 95 BPM    Atrial Rate 95 BPM    P-R Interval 136 ms    QRS Duration 86 ms    Q-T Interval 352 ms    QTC Calculation(Bazett) 442 ms    P Axis 84 degrees    R Axis 88 degrees    T Axis 58 degrees    Diagnosis Line Normal sinus rhythm  Low voltage QRS  possible BREANNA  Borderline ECG    Confirmed by Cookie Ordaz ([id number]) on 5/2/2024 2:38:25 PM (05-02-24 @ 09:49)      TRANSTHORACIC ECHOCARDIOGRAM REPORT  ________________________________________________________________________________                                      _______       Pt. Name:       YUE COTTO Study Date:    4/26/2024  MRN:            [id number]            YOB: 1940  Accession #:    [id number]           Age:           83 years  Account#:       [id number]          Gender:        M  Visit ID#  Heart Rate:                         Height:        70.08 in (178.00 cm)  Rhythm:                            Weight:        220.46 lb (100.00 kg)  Blood Pressure: 158/69 mmHg         BSA/BMI:       2.18 m² / 31.56 kg/m²  ________________________________________________________________________________________  Referring Physician:   [id number] Marc Grossman  Interpreting Physician: Cookie Ordaz MD  Primary Sonographer:    Clayton Wilkinson    CPT:               ECHO TTE WO CON COMP W DOPP - 75202.m  Indication(s):     Unspecified atrial fibrillation - I48.91  Procedure:         Transthoracic echocardiogram with 2-D, M-mode and complete                     spectral and color flow Doppler.  Ordering Location: [facility]  Admission Status:  Inpatient  Study Information: Image quality for this study is technically difficult.    _______________________________________________________________________________________     CONCLUSIONS:      1. Technically difficult image quality.   2. The LV is not well visualized, however the LV function is probably normal based on limited views.   3. The right ventricle is not well visualized. probably normal systolic function.   4. There is moderate thickening of the aortic valve leaflets.   5. Structurally normal mitral valve with normal leaflet excursion.   6. Trace tricuspid regurgitation.   7. The inferior vena cava is dilated measuring 2.18 cm in diameter, (dilated >2.1cm) with normal inspiratory collapse (normal >50%) consistent with mildly elevated right atrial pressure (~8, range 5-10mmHg).   8. Estimated pulmonary artery systolic pressure is 26 mmHg, consistent with normal pulmonary artery pressure.    ________________________________________________________________________________________  FINDINGS:     Left Ventricle:  There is normal left ventricular diastolic function. Left ventricular endocardium is not well visualized.     Right Ventricle:  The right ventricle is not well visualized. Probably normal systolic function.     Left Atrium:  The left atrium is normal in size with an indexed volume of 16.30 ml/m².     Right Atrium:  The right atrium is normal in size.     Aortic Valve:  The aortic valve anatomy cannot be determined with normal systolic excursion. There is mild calcification of the aortic valve leaflets. There is moderate thickening of the aortic valve leaflets.     Mitral Valve:  Structurally normal mitral valve with normal leaflet excursion. There is trace mitral regurgitation.     Tricuspid Valve:  The tricuspid valve was not well visualized. There is trace tricuspid regurgitation. Estimated pulmonary artery systolic pressure is 26 mmHg, consistent with normal pulmonary artery pressure.     Pulmonic Valve:  The pulmonic valve was not well visualized.     Aorta:  The aortic root at the sinuses of Valsalva is normal in size, measuring 3.30 cm (indexed 1.52 cm/m²).     Systemic Veins:  The inferior vena cava is dilated measuring 2.18 cm in diameter, (dilated >2.1cm) with normal inspiratory collapse (normal >50%) consistent with mildly elevated right atrial pressure (~8, range 5-10mmHg).  ____________________________________________________________________  QUANTITATIVE DATA:  Left Ventricle Measurements: (Indexed to BSA)     IVSd (2D):   1.1 cm  LVPWd (2D):  0.9 cm  LVIDd (2D):  4.0 cm  LVIDs (2D):  2.8 cm  LV Mass:     129 g  59.1 g/m²     MVE Vmax:    0.59 m/s  MV A Vmax:    0.85 m/s  MV E/A:       0.69  e' lateral:   6.96 cm/s  e' medial:    5.98 cm/s  E/e' lateral: 8.43  E/e' medial:  9.82  E/e' Average: 9.07  MV DT:        195 msec    Aorta Measurements: (Normal range) (Indexed to BSA)     Sinuses of Valsalva: 3.30 cm (3.1 - 3.7 cm)       Left Atrium Measurements: (Indexed to BSA)  LA Diam 2D: 3.30 cm       LVOT / RVOT/ Qp/Qs Data: (Indexed to BSA)  LVOT Diameter: 1.80 cm  LVOT Area:     2.54 cm²    Mitral Valve Measurements:     MV E Vmax: 0.6 m/s  MV A Vmax: 0.8 m/s  MV E/A:    0.7       Tricuspid Valve Measurements:     TR Vmax:          2.1 m/s  TR Peak Gradient: 18.1 mmHg  RA Pressure:      8 mmHg  PASP:             26 mmHg    ________________________________________________________________________________________  Electronically signed on 4/26/2024 at 4:46:45 PM by Cookie Ordaz MD         *** Final ***

## 2024-05-06 NOTE — PROGRESS NOTE ADULT - ASSESSMENT
83-year-old  black male with history of COPD coronary artery disease hypertension diabetes hyperlipidemia atrial fibrillation on Eliquis as well as osteomyelitis who presents now to the emergency department at [facility] complaining of rapid heart rate    jacy  copd  AF  OP  OA  CAD  HTN  DM  HLD  OM hx  CVA     on TF - swallow studies noted  on cipro -   s/p Nucala for Asthma - as per home rx regimen  s/p CVA tx - CT head repeat noted -   transferred out of ICU    follows with Dr Ryland ashley med  uses NIV - BIPAP night time for JACY - sleep hygiene reviewed  cardio eval - [facility] rx regimen  BP control  DM care  AF rate and rhythm control  TFT  outpatient record reviewed  proventil PRN - Singulair - copd  spoke with WIFE  on emp ABX   wound care

## 2024-05-06 NOTE — PROGRESS NOTE ADULT - ASSESSMENT
83yoM PMHx AFib on Eliquis, CAD, HTN, DM, HLD, COPD, osteomyelitis presents c/o shortness of breath, chest discomfort. Admitted for AFib w/ RVR. Hospital course c/b CVA on 4/27/2024. 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      Problem/Plan - 1:  ·  Problem: Cerebrovascular accident (CVA).   ·  Plan: - MRI Head: Multiple acute infarcts scattered throughout the bilateral cerebral and cerebellar hemispheres. More dominant acute to subacute infarction in the posterior left MCA vascular distribution. Chronic small vessel disease.  - No evidence of hemorrhagic conversion -repeat head CT from May 1 with no acute findings  - Continue Eliquis 2.5mg PO Q12h  - Weaned off phenylephrine.  Change midodrine to 5mg PO Q8h  - Continue aspirin 81mg PO daily and Lipitor 80mg PO QHS  - Neuro checks per protocol  - Pt. pulled out NGT yesterday.  Now on pureed and moderately thick liquids  - s/p MBS  - Fall and aspiration precautions   - Neurology f/u     Problem/Plan - 2:  ·  Problem: Atrial fibrillation with RVR.   ·  Plan: History of Afib, on home Metoprolol BID and Eliquis   - TTE 4/26/2024: LV no well visualized however LV probably normal based on limited views, moderate thickening of aortic valve leaflets, trace TR, dilated IVC with normal inspiratory collapse, normal pulmonary artery pressure  - Resumed AC  - Continue po amiodarone  - Telemetry monitoring, electrolyte monitoring  - Cardiology f/u     Problem/Plan - 3:  ·  Problem: Elevated troponin.   ·  Plan: Elevated trop on admission likely 2/2 Afib w/ RVR   - TTE 4/26/2024: technically difficult study, LV not well visualized however LV probably normal based on limited views, moderate thickening of aortic valve leaflets, trace TR, dilated IVC with normal inspiratory collapse, normal pulmonary artery pressure  - Trop x2 both sets elevated but downtrended   - No complaints of chest pain, low suspicions of ACS --> monitor for signs and symptoms of ischemia   - Cardio following     Problem/Plan - 4:  ·  Problem: Open toe wound.   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   ·  Plan: MERRILL on admission. Baseline Cr around 1.1  - On admission Cr 1.6   - Monitor volume status closely  - Avoid nephrotoxics - hold home furosemide for now  - Renal indices have been stable.     Problem/Plan - 6:  ·  Problem: CAD (coronary artery disease).   ·  Plan: Chronic  - continue asa/statin  - Lipid panel within normal     Problem/Plan - 7:  ·  Problem: HTN (hypertension).   ·  Plan: Chronic  - DC phenylephrine.  Change midodrine to 5mg PO Q8h  - Lopressor initially discontinued to allow for permissive HTN  - Monitor routine hemodynamics     Problem/Plan - 8:  ·  Problem: Type 2 diabetes mellitus.   ·  Plan: Chronic, on home insulin (Lantus 10u)  - continue Lantus 15 units sQ QHS  - moderate dose sliding scale  - hypoglycemia protocol in place, fingersticks q ac, q hs   - goal -180 in hospital setting, adjust insulin regimen prn  - HbA1c 6.8     Problem/Plan - 9:  ·  Problem: COPD (chronic obstructive pulmonary disease).   ·  Plan: Chronic, baseline 2L home O2 and BiPAP qHS   - resume home montelukast   - Continue Bipap overnight   - Continue Pulmicort inh BID and Duoneb tx Q6h  - Pulm Dr. Combs consulted     Problem/Plan - 10:  ·  Problem: Need for prophylactic measure.   ·  Plan; #VTE ppx: Eliquis  #Diet: pureed with moderately thick liquids  #Activity: Activity - Increase As Tolerated:   Time/Priority:  Routine (04-27-24 @ 22:51) [Active]  #ACP: Full code  #Dispo: further plans pending clinical course.

## 2024-05-06 NOTE — PROGRESS NOTE ADULT - SUBJECTIVE AND OBJECTIVE BOX
Date/Time Patient Seen:  		  Referring MD:   Data Reviewed	       Patient is a 83y old  Male who presents with a chief complaint of AFib w/ RVR (05 May 2024 12:16)      Subjective/HPI     PAST MEDICAL & SURGICAL HISTORY:  Diabetes Mellitus, Type II    CAD (Coronary Artery Disease)  s/p 3v CABG 2004; stents placed in [place] in 2019    Dyslipidemia    Asymptomatic Peripheral Vascular Disease    Osteomyelitis    COPD (chronic obstructive pulmonary disease)  on 2L at home and BiPAP at night; intubated 6/18    Diabetes mellitus    Hypertension    PVD (peripheral vascular disease)    History of PAT (paroxysmal atrial tachycardia)    Asthma with COPD    BPH (benign prostatic hyperplasia)    Acute osteomyelitis    CABG (Coronary Artery Bypass Graft)  2004    Compound fracture  left leg    S/P primary angioplasty with coronary stent    H/O drainage of abscess  Left femur 12/2021          Medication list         MEDICATIONS  (STANDING):  albuterol/ipratropium for Nebulization 3 milliLiter(s) Nebulizer every 6 hours  aMIOdarone    Tablet 400  Oral   aMIOdarone    Tablet 200 milliGRAM(s) Oral daily  apixaban 2.5 milliGRAM(s) Oral every 12 hours  aspirin  chewable 81 milliGRAM(s) Enteral Tube daily  atorvastatin 80 milliGRAM(s) Oral at bedtime  buDESOnide    Inhalation Suspension 0.5 milliGRAM(s) Inhalation two times a day  ciprofloxacin   IVPB 400 milliGRAM(s) IV Intermittent every 12 hours  dextrose 10% Bolus 125 milliLiter(s) IV Bolus once  dextrose 5%. 1000 milliLiter(s) (50 mL/Hr) IV Continuous <Continuous>  dextrose 5%. 1000 milliLiter(s) (100 mL/Hr) IV Continuous <Continuous>  dextrose 50% Injectable 25 Gram(s) IV Push once  dextrose 50% Injectable 12.5 Gram(s) IV Push once  glucagon  Injectable 1 milliGRAM(s) IntraMuscular once  insulin glargine Injectable (LANTUS) 15 Unit(s) SubCutaneous at bedtime  insulin lispro (ADMELOG) corrective regimen sliding scale   SubCutaneous at bedtime  insulin lispro (ADMELOG) corrective regimen sliding scale   SubCutaneous three times a day before meals  midodrine 10 milliGRAM(s) Oral every 8 hours    MEDICATIONS  (PRN):  acetaminophen   Oral Liquid .. 650 milliGRAM(s) Oral every 6 hours PRN Temp greater or equal to 38.5C (101.3F)  dextrose Oral Gel 15 Gram(s) Oral once PRN Blood Glucose LESS THAN 70 milliGRAM(s)/deciliter         Vitals log        ICU Vital Signs Last 24 Hrs  T(C): 36.5 (06 May 2024 04:52), Max: 37.4 (05 May 2024 07:42)  T(F): 97.7 (06 May 2024 04:52), Max: 99.3 (05 May 2024 07:42)  HR: 93 (06 May 2024 04:52) (87 - 114)  BP: 158/80 (06 May 2024 04:52) (113/53 - 160/68)  BP(mean): 92 (05 May 2024 21:00) (77 - 100)  ABP: --  ABP(mean): --  RR: 16 (06 May 2024 04:52) (16 - 29)  SpO2: 92% (06 May 2024 04:52) (92% - 100%)    O2 Parameters below as of 06 May 2024 04:52  Patient On (Oxygen Delivery Method): room air                 Input and Output:  I&O's Detail    04 May 2024 07:01  -  05 May 2024 07:00  --------------------------------------------------------  IN:    Free Water: 160 mL    Glucerna 1.5: 1220 mL    IV PiggyBack: 250 mL    IV PiggyBack: 200 mL  Total IN: 1830 mL    OUT:    Incontinent per Condom Catheter (mL): 750 mL  Total OUT: 750 mL    Total NET: 1080 mL      05 May 2024 07:01  -  06 May 2024 05:55  --------------------------------------------------------  IN:    Free Water: 50 mL    IV PiggyBack: 400 mL    Oral Fluid: 130 mL  Total IN: 580 mL    OUT:    Incontinent per Condom Catheter (mL): 700 mL  Total OUT: 700 mL    Total NET: -120 mL          Lab Data                        12.1   10.20 )-----------( 266      ( 05 May 2024 05:10 )             38.9     05-05    144  |  109<H>  |  29<H>  ----------------------------<  304<H>  3.8   |  29  |  1.80<H>    Ca    9.3      05 May 2024 05:10  Phos  2.6     05-05  Mg     2.3     05-05              Review of Systems	      Objective     Physical Examination    heart s1s2  lung dc BS  head nc  head at      Pertinent Lab findings & Imaging      Eliecer:  NO   Adequate UO     I&O's Detail    04 May 2024 07:01  -  05 May 2024 07:00  --------------------------------------------------------  IN:    Free Water: 160 mL    Glucerna 1.5: 1220 mL    IV PiggyBack: 250 mL    IV PiggyBack: 200 mL  Total IN: 1830 mL    OUT:    Incontinent per Condom Catheter (mL): 750 mL  Total OUT: 750 mL    Total NET: 1080 mL      05 May 2024 07:01  -  06 May 2024 05:55  --------------------------------------------------------  IN:    Free Water: 50 mL    IV PiggyBack: 400 mL    Oral Fluid: 130 mL  Total IN: 580 mL    OUT:    Incontinent per Condom Catheter (mL): 700 mL  Total OUT: 700 mL    Total NET: -120 mL               Discussed with:     Cultures:	        Radiology

## 2024-05-06 NOTE — CONSULT NOTE ADULT - SUBJECTIVE AND OBJECTIVE BOX
Patient is a 83y old  Male who presents with a chief complaint of AFib w/ RVR (06 May 2024 08:49)    HPI:  83yoM PMHx AFib on Eliquis, CAD, HTN, DM, HLD, COPD, osteomyelitis presents c/o shortness of breath, chest discomfort. Pt reports onset of rapid heart rate this morning, HR measured 160s when he checked his pulse ox. Also endorses dyspnea exacerbated by movement. Pt reports last episode of AFib in , no episodes since then until today's presentation. Pt states that chest discomfort feels like chest pressure, no chest pain. Pt also reports chronic R big toe wound for the past few months, follows w/ Wound Care. States that he has increased sensitivity to R sole of foot, but denies pain, numbness/tingling. Denies fevers, chills, abdominal pain, N/V/D/C.     IN THE ED:  Temp 98  F ,  , /81  ,RR 18 , SpO2 94%  S/P PO , IV diltiazem 10mg x2, IV diltiazem gtt @ 10 mg/hr  Labs significant for BUN/Cr 25/1.6, troponin 507.9, pBNP 355  Imaging:   CXR wet read: no gross abnormalities (2024 12:13)      PAST MEDICAL HISTORY:  Diabetes Mellitus, Type II    CAD (Coronary Artery Disease)    Dyslipidemia    Asymptomatic Peripheral Vascular Disease    Osteomyelitis    COPD (chronic obstructive pulmonary disease)    Diabetes mellitus    Hypertension    PVD (peripheral vascular disease)    History of PAT (paroxysmal atrial tachycardia)    Asthma with COPD    BPH (benign prostatic hyperplasia)    Acute osteomyelitis        PAST SURGICAL HISTORY:  CABG (Coronary Artery Bypass Graft)    Compound fracture    S/P primary angioplasty with coronary stent    H/O drainage of abscess        FAMILY HISTORY:  Family history of diabetes mellitus (Sibling)    Family hx of lung cancer  brother,  age 82, used to smoke with pt        SOCIAL HISTORY:    Allergies    No Known Allergies    Intolerances      Home Medications:  ascorbic acid 500 mg oral tablet: 1 tab(s) orally once a day (2024 16:08)  aspirin 81 mg oral tablet: orally once a day (2024 16:04)  Breo Ellipta 200 mcg-25 mcg/inh inhalation powder: 1 puff(s) inhaled once a day (2024 09:10)  cholecalciferol 25 mcg (1000 intl units) oral tablet: 1 tab(s) orally once a day (2024 16:09)  ciprofloxacin 500 mg oral tablet: 1 tab(s) orally 2 times a day (2024 16:04)  CoQ10 100 mg oral capsule: 1 cap(s) orally once a day (2024 16:04)  cyanocobalamin 1000 mcg oral tablet: 1 tab(s) orally once a day (2024 16:09)  furosemide 20 mg oral tablet: 1 tab(s) orally once a day (2024 16:04)  Incruse Ellipta 62.5 mcg/inh inhalation powder: 1 inhaler(s) inhaled once a day (2024 09:11)  magnesium oxide 400 mg oral tablet: 1 tab(s) orally once a day (2024 16:09)  montelukast 10 mg oral tablet: 1 tab(s) orally once a day (2024 16:04)  Multiple Vitamins oral tablet: 1 tab(s) orally once a day (2024 16:08)  NovoLOG 100 units/mL subcutaneous solution: Sliding Scale as needed (2024 16:01)  Nucala 100 mg subcutaneous injection: 100 milligram(s) subcutaneously once a month (2024 09:11)  Omega-3 1000 mg oral capsule: 1 cap(s) orally once a day (2024 16:08)  Ozempic 2 mg/3 mL (0.25 mg or 0.5 mg dose) subcutaneous solution: 0.25mg once a week for 28 days then 0.5mg every week (2024 09:11)  rosuvastatin 20 mg oral tablet: 1 tab(s) orally once a day (at bedtime) (2024 16:04)  tamsulosin 0.4 mg oral capsule: 1 cap(s) orally once a day (at bedtime) (2024 16:04)  turmeric 1000 mg oral capsule: 1 cap(s) orally once a day (2024 16:09)  Vitamin B Complex 100: 1 tab(s) orally once a day (2024 16:04)  Vitamin B Complex oral tablet: 1 tab(s) orally once a day (2024 16:09)  zinc sulfate 220 mg oral tablet: 1 tab(s) orally once a day (2024 16:08)    MEDICATIONS  (STANDING):  albuterol/ipratropium for Nebulization 3 milliLiter(s) Nebulizer every 6 hours  aMIOdarone    Tablet 400  Oral   aMIOdarone    Tablet 200 milliGRAM(s) Oral daily  apixaban 2.5 milliGRAM(s) Oral every 12 hours  aspirin  chewable 81 milliGRAM(s) Enteral Tube daily  atorvastatin 80 milliGRAM(s) Oral at bedtime  buDESOnide    Inhalation Suspension 0.5 milliGRAM(s) Inhalation two times a day  ciprofloxacin   IVPB 400 milliGRAM(s) IV Intermittent every 12 hours  dextrose 10% Bolus 125 milliLiter(s) IV Bolus once  dextrose 5%. 1000 milliLiter(s) (100 mL/Hr) IV Continuous <Continuous>  dextrose 5%. 1000 milliLiter(s) (60 mL/Hr) IV Continuous <Continuous>  dextrose 5%. 1000 milliLiter(s) (50 mL/Hr) IV Continuous <Continuous>  dextrose 50% Injectable 25 Gram(s) IV Push once  dextrose 50% Injectable 12.5 Gram(s) IV Push once  glucagon  Injectable 1 milliGRAM(s) IntraMuscular once  insulin glargine Injectable (LANTUS) 15 Unit(s) SubCutaneous at bedtime  insulin lispro (ADMELOG) corrective regimen sliding scale   SubCutaneous at bedtime  insulin lispro (ADMELOG) corrective regimen sliding scale   SubCutaneous three times a day before meals  midodrine 5 milliGRAM(s) Oral every 8 hours  montelukast 10 milliGRAM(s) Oral daily    MEDICATIONS  (PRN):  acetaminophen   Oral Liquid .. 650 milliGRAM(s) Oral every 6 hours PRN Temp greater or equal to 38.5C (101.3F)  dextrose Oral Gel 15 Gram(s) Oral once PRN Blood Glucose LESS THAN 70 milliGRAM(s)/deciliter      REVIEW OF SYSTEMS:  General:   Respiratory: No cough, SOB  Cardiovascular: No CP or Palpitations	  Gastrointestinal: No nausea, Vomiting. No diarrhea  Genitourinary: No urinary complaints	  Musculoskeletal: No leg swelling, No new rash or lesions	  Neurological: 	  all other systems negative    T(F): 97.7 (24 @ 04:52), Max: 99 (24 @ 11:55)  HR: 93 (24 @ 04:52) (87 - 104)  BP: 158/80 (24 @ 04:52) (113/53 - 158/80)  RR: 16 (24 @ 04:52) (16 - 29)  SpO2: 92% (24 @ 04:52) (92% - 100%)  Wt(kg): --    PHYSICAL EXAM:  General: NAD  Respiratory: b/l air entry  Cardiovascular: S1 S2  Gastrointestinal: soft  Extremities: edema            147<H>  |  113<H>  |  25<H>  ----------------------------<  188<H>  4.2   |  25  |  1.60<H>    Ca    9.8      06 May 2024 06:18  Phos  2.9       Mg     2.1                                 12.3   8.13  )-----------( 274      ( 06 May 2024 06:18 )             40.2       Blood Urea Nitrogen: 25 mg/dL ( @ 06:18)  Calcium: 9.8 mg/dL ( @ 06:18)  Potassium: 4.2 mmol/L ( @ 06:18)  Hemoglobin: 12.3 g/dL ( @ 06:18)      Creatinine, Serum: 1.60 ( @ 06:18)  Creatinine, Serum: 1.80 ( @ 05:10)  Creatinine, Serum: 1.80 ( @ 05:25)      Urinalysis Basic - ( 06 May 2024 06:18 )    Color: x / Appearance: x / SG: x / pH: x  Gluc: 188 mg/dL / Ketone: x  / Bili: x / Urobili: x   Blood: x / Protein: x / Nitrite: x   Leuk Esterase: x / RBC: x / WBC x   Sq Epi: x / Non Sq Epi: x / Bacteria: x                    I&O's Detail    05 May 2024 07:01  -  06 May 2024 07:00  --------------------------------------------------------  IN:    Free Water: 50 mL    IV PiggyBack: 400 mL    Oral Fluid: 130 mL  Total IN: 580 mL    OUT:    Incontinent per Condom Catheter (mL): 700 mL  Total OUT: 700 mL    Total NET: -120 mL            Culture - Urine (collected 01 May 2024 01:15)  Source: Clean Catch Clean Catch (Midstream)  Final Report (02 May 2024 12:08):    No growth    Culture - Blood (collected 2024 23:50)  Source: .Blood Blood-Peripheral  Final Report (06 May 2024 05:01):    No growth at 5 days    Culture - Blood (collected 2024 23:45)  Source: .Blood Blood-Peripheral  Final Report (06 May 2024 05:01):    No growth at 5 days       Patient is a 83y old  Male who presents with a chief complaint of AFib w/ RVR (06 May 2024 08:49)    HPI:  83 yoM PMHx AFib on Eliquis, CAD, HTN, DM, HLD, COPD, osteomyelitis presents c/o shortness of breath, chest discomfort. Pt reports onset of rapid heart rate this morning, HR measured 160s when he checked his pulse ox. Also endorses dyspnea exacerbated by movement. Pt reports last episode of AFib in , no episodes since then until today's presentation. Pt states that chest discomfort feels like chest pressure, no chest pain. Pt also reports chronic R big toe wound for the past few months, follows w/ Wound Care. States that he has increased sensitivity to R sole of foot, but denies pain, numbness/tingling. Denies fevers, chills, abdominal pain, N/V/D/C.     IN THE ED:  Temp 98  F ,  , /81  ,RR 18 , SpO2 94%  S/P PO , IV diltiazem 10mg x2, IV diltiazem gtt @ 10 mg/hr  Labs significant for BUN/Cr 25/1.6, troponin 507.9, pBNP 355  Imaging:   CXR wet read: no gross abnormalities (2024 12:13)    Renal consult called for [facility], Hypernatremia. History obtained from chart and patient's wife at bedside.       PAST MEDICAL HISTORY:  Diabetes Mellitus, Type II    CAD (Coronary Artery Disease)    Dyslipidemia    Asymptomatic Peripheral Vascular Disease    Osteomyelitis    COPD (chronic obstructive pulmonary disease)    Diabetes mellitus    Hypertension    PVD (peripheral vascular disease)    History of PAT (paroxysmal atrial tachycardia)    Asthma with COPD    BPH (benign prostatic hyperplasia)    Acute osteomyelitis        PAST SURGICAL HISTORY:  CABG (Coronary Artery Bypass Graft)    Compound fracture    S/P primary angioplasty with coronary stent    H/O drainage of abscess        FAMILY HISTORY:  Family history of diabetes mellitus (Sibling)    Family hx of lung cancer  brother,  age 82, used to smoke with pt        SOCIAL HISTORY: No smoking or alcohol use     Allergies    No Known Allergies    Intolerances      Home Medications:  ascorbic acid 500 mg oral tablet: 1 tab(s) orally once a day (2024 16:08)  aspirin 81 mg oral tablet: orally once a day (2024 16:04)  Breo Ellipta 200 mcg-25 mcg/inh inhalation powder: 1 puff(s) inhaled once a day (2024 09:10)  cholecalciferol 25 mcg (1000 intl units) oral tablet: 1 tab(s) orally once a day (2024 16:09)  ciprofloxacin 500 mg oral tablet: 1 tab(s) orally 2 times a day (2024 16:04)  CoQ10 100 mg oral capsule: 1 cap(s) orally once a day (2024 16:04)  cyanocobalamin 1000 mcg oral tablet: 1 tab(s) orally once a day (2024 16:09)  furosemide 20 mg oral tablet: 1 tab(s) orally once a day (2024 16:04)  Incruse Ellipta 62.5 mcg/inh inhalation powder: 1 inhaler(s) inhaled once a day (2024 09:11)  magnesium oxide 400 mg oral tablet: 1 tab(s) orally once a day (2024 16:09)  montelukast 10 mg oral tablet: 1 tab(s) orally once a day (2024 16:04)  Multiple Vitamins oral tablet: 1 tab(s) orally once a day (2024 16:08)  NovoLOG 100 units/mL subcutaneous solution: Sliding Scale as needed (2024 16:01)  Nucala 100 mg subcutaneous injection: 100 milligram(s) subcutaneously once a month (2024 09:11)  Omega-3 1000 mg oral capsule: 1 cap(s) orally once a day (2024 16:08)  Ozempic 2 mg/3 mL (0.25 mg or 0.5 mg dose) subcutaneous solution: 0.25mg once a week for 28 days then 0.5mg every week (2024 09:11)  rosuvastatin 20 mg oral tablet: 1 tab(s) orally once a day (at bedtime) (2024 16:04)  tamsulosin 0.4 mg oral capsule: 1 cap(s) orally once a day (at bedtime) (2024 16:04)  turmeric 1000 mg oral capsule: 1 cap(s) orally once a day (2024 16:09)  Vitamin B Complex 100: 1 tab(s) orally once a day (2024 16:04)  Vitamin B Complex oral tablet: 1 tab(s) orally once a day (2024 16:09)  zinc sulfate 220 mg oral tablet: 1 tab(s) orally once a day (2024 16:08)    MEDICATIONS  (STANDING):  albuterol/ipratropium for Nebulization 3 milliLiter(s) Nebulizer every 6 hours  aMIOdarone    Tablet 400  Oral   aMIOdarone    Tablet 200 milliGRAM(s) Oral daily  apixaban 2.5 milliGRAM(s) Oral every 12 hours  aspirin  chewable 81 milliGRAM(s) Enteral Tube daily  atorvastatin 80 milliGRAM(s) Oral at bedtime  buDESOnide    Inhalation Suspension 0.5 milliGRAM(s) Inhalation two times a day  ciprofloxacin   IVPB 400 milliGRAM(s) IV Intermittent every 12 hours  dextrose 10% Bolus 125 milliLiter(s) IV Bolus once  dextrose 5%. 1000 milliLiter(s) (100 mL/Hr) IV Continuous <Continuous>  dextrose 5%. 1000 milliLiter(s) (60 mL/Hr) IV Continuous <Continuous>  dextrose 5%. 1000 milliLiter(s) (50 mL/Hr) IV Continuous <Continuous>  dextrose 50% Injectable 25 Gram(s) IV Push once  dextrose 50% Injectable 12.5 Gram(s) IV Push once  glucagon  Injectable 1 milliGRAM(s) IntraMuscular once  insulin glargine Injectable (LANTUS) 15 Unit(s) SubCutaneous at bedtime  insulin lispro (ADMELOG) corrective regimen sliding scale   SubCutaneous at bedtime  insulin lispro (ADMELOG) corrective regimen sliding scale   SubCutaneous three times a day before meals  midodrine 5 milliGRAM(s) Oral every 8 hours  montelukast 10 milliGRAM(s) Oral daily    MEDICATIONS  (PRN):  acetaminophen   Oral Liquid .. 650 milliGRAM(s) Oral every 6 hours PRN Temp greater or equal to 38.5C (101.3F)  dextrose Oral Gel 15 Gram(s) Oral once PRN Blood Glucose LESS THAN 70 milliGRAM(s)/deciliter      REVIEW OF SYSTEMS:  General: no distress  Respiratory: No cough, SOB  Cardiovascular: No CP or Palpitations	  Gastrointestinal: No nausea, Vomiting. No diarrhea  Genitourinary: No urinary complaints	  Musculoskeletal: No new rash or lesions	  all other systems negative    T(F): 97.7 (24 @ 04:52), Max: 99 (24 @ 11:55)  HR: 93 (24 @ 04:52) (87 - 104)  BP: 158/80 (24 @ 04:52) (113/53 - 158/80)  RR: 16 (24 @ 04:52) (16 - 29)  SpO2: 92% (24 @ 04:52) (92% - 100%)  Wt(kg): --    PHYSICAL EXAM:  General: NAD  Respiratory: b/l air entry  Cardiovascular: S1 S2  Gastrointestinal: soft  Extremities: + edema            147<H>  |  113<H>  |  25<H>  ----------------------------<  188<H>  4.2   |  25  |  1.60<H>    Ca    9.8      06 May 2024 06:18  Phos  2.9       Mg     2.1                                 12.3   8.13  )-----------( 274      ( 06 May 2024 06:18 )             40.2       Blood Urea Nitrogen: 25 mg/dL ( @ 06:18)  Calcium: 9.8 mg/dL ( @ 06:18)  Potassium: 4.2 mmol/L ( @ 06:18)  Hemoglobin: 12.3 g/dL ( @ 06:18)      Creatinine, Serum: 1.60 ( @ 06:18)  Creatinine, Serum: 1.80 ( @ 05:10)  Creatinine, Serum: 1.80 ( @ 05:25)      Urinalysis Basic - ( 06 May 2024 06:18 )    Color: x / Appearance: x / SG: x / pH: x  Gluc: 188 mg/dL / Ketone: x  / Bili: x / Urobili: x   Blood: x / Protein: x / Nitrite: x   Leuk Esterase: x / RBC: x / WBC x   Sq Epi: x / Non Sq Epi: x / Bacteria: x                    I&O's Detail    05 May 2024 07:01  -  06 May 2024 07:00  --------------------------------------------------------  IN:    Free Water: 50 mL    IV PiggyBack: 400 mL    Oral Fluid: 130 mL  Total IN: 580 mL    OUT:    Incontinent per Condom Catheter (mL): 700 mL  Total OUT: 700 mL    Total NET: -120 mL            Culture - Urine (collected 01 May 2024 01:15)  Source: Clean Catch Clean Catch (Midstream)  Final Report (02 May 2024 12:08):    No growth    Culture - Blood (collected 2024 23:50)  Source: .Blood Blood-Peripheral  Final Report (06 May 2024 05:01):    No growth at 5 days    Culture - Blood (collected 2024 23:45)  Source: .Blood Blood-Peripheral  Final Report (06 May 2024 05:01):    No growth at 5 days        < from: US Duplex Venous Upper Ext Ltd, Left (24 @ 12:25) >    ACC: [id number] EXAM:  US DPLX UPR EXT VEINS LTD LT   ORDERED BY: KHOA GOETZ     PROCEDURE DATE:  2024          INTERPRETATION:  CLINICAL INFORMATION: Persistent left-sided forearm   swelling and erythema.    COMPARISON: Prior duplex evaluation of the left upper extremity from   2024.    TECHNIQUE: Duplex sonography of the LEFT UPPER extremity veins with color   and spectral Doppler, with and without compression.    FINDINGS:    The left internal jugular, subclavian, axillary and brachial veins are   patent and compressible where applicable.  The basilic vein (superficial   vein) is patent and without thrombus.  The cephalic and basilic veins are   not visualized. The radial vein is visualized.    Doppler examination shows normal spontaneous and phasic flow.    IMPRESSION:  No evidence of left upper extremity deep venous thrombosis, within the   visualized veins.    --- End of Report ---            LANE SALOMON MD; Attending Radiologist  This document has been electronically signed. May  4 2024 12:39PM    < end of copied text >

## 2024-05-06 NOTE — PROGRESS NOTE ADULT - SUBJECTIVE AND OBJECTIVE BOX
Neurology Follow up note    YUE [id number]    HPI:  83yoM PMHx AFib on Eliquis, CAD, HTN, DM, HLD, COPD, osteomyelitis presents c/o shortness of breath, chest discomfort. Pt reports onset of rapid heart rate this morning, HR measured 160s when he checked his pulse ox. Also endorses dyspnea exacerbated by movement. Pt reports last episode of AFib in 2020, no episodes since then until today's presentation. Pt states that chest discomfort feels like chest pressure, no chest pain. Pt also reports chronic R big toe wound for the past few months, follows w/ Wound Care. States that he has increased sensitivity to R sole of foot, but denies pain, numbness/tingling. Denies fevers, chills, abdominal pain, N/V/D/C.     IN THE ED:  Temp 98  F ,  , /81  ,RR 18 , SpO2 94%  S/P PO , IV diltiazem 10mg x2, IV diltiazem gtt @ 10 mg/hr  Labs significant for BUN/Cr 25/1.6, troponin 507.9, pBNP 355  Imaging:   CXR wet read: no gross abnormalities (25 Apr 2024 12:13)      Interval History -more interactive today    Patient is seen, chart was reviewed and case was discussed with the treatment team.  Pt is not in any distress.   Lying on bed comfortably.     .    Vital Signs Last 24 Hrs  T(C): 36.4 (06 May 2024 11:44), Max: 36.9 (05 May 2024 20:02)  T(F): 97.5 (06 May 2024 11:44), Max: 98.4 (05 May 2024 20:02)  HR: 101 (06 May 2024 11:44) (87 - 104)  BP: 143/85 (06 May 2024 11:44) (113/53 - 158/80)  BP(mean): 92 (05 May 2024 21:00) (77 - 100)  RR: 19 (06 May 2024 11:44) (16 - 29)  SpO2: 93% (06 May 2024 11:44) (92% - 100%)    Parameters below as of 06 May 2024 11:44  Patient On (Oxygen Delivery Method): room air                                REVIEW OF SYSTEMS:    Constitutional: No fever, weight loss or fatigue  Eyes: No eye pain, visual disturbances, or discharge  ENT:  No difficulty hearing, tinnitus, vertigo; No sinus or throat pain  Neck: No pain or stiffness  Respiratory: No wheezing, chills or hemoptysis  Cardiovascular: No chest pain, palpitations, shortness of breath, dizziness  Gastrointestinal: No abdominal or epigastric pain. No nausea, vomiting or hematemesis  Genitourinary: No dysuria, frequency, hematuria or incontinence  Neurological: No headaches, numbness or tremors  Psychiatric: No depression, anxiety, mood swings or difficulty sleeping  Musculoskeletal: No joint pain or swelling; No muscle, back or extremity pain  Skin: No itching, burning, rashes or lesions   Lymph Nodes: No enlarged glands  Endocrine: No heat or cold intolerance; No hair loss,   Allergy and Immunologic: No hives or eczema    On Neurological Examination:    Mental Status - Pt is alert, awake, oriented X3.. Follows commands well and able to answer questions appropriately.Mood and affect  normal    Speech -  MIXED APHASIA    Cranial Nerves - Pupils 3 mm equal and reactive to light, extraocular eye movements intact. Pt has no visual field deficit.  Pt has right facial weakness  . Facial sensation is intact.Tongue - is in midline.    Muscle tone - is decreased on right  .      Motor Exam -right hemiparesis; RUE 1/5; LE 1-2/5   No shaking or tremors.    Sensory Exam -. Pt withdraws all extremities equally on stimulation. No asymmetry seen. No complaints of tingling, numbness.        coordination:    Finger to nose: normal-left        Deep tendon Reflexes - 2 plus all over.            Neck Supple -  Yes.     MEDICATIONS  (STANDING):  albuterol/ipratropium for Nebulization 3 milliLiter(s) Nebulizer every 6 hours  aMIOdarone    Tablet 400 milliGRAM(s) Oral every 8 hours  aMIOdarone    Tablet 400  Oral   apixaban 2.5 milliGRAM(s) Oral every 12 hours  aspirin  chewable 81 milliGRAM(s) Enteral Tube daily  atorvastatin 80 milliGRAM(s) Oral at bedtime  buDESOnide    Inhalation Suspension 0.5 milliGRAM(s) Inhalation two times a day  ciprofloxacin   IVPB 400 milliGRAM(s) IV Intermittent every 12 hours  insulin lispro (ADMELOG) corrective regimen sliding scale   SubCutaneous every 6 hours  midodrine 10 milliGRAM(s) Oral every 8 hours    MEDICATIONS  (PRN):  acetaminophen   Oral Liquid .. 650 milliGRAM(s) Oral every 6 hours PRN Temp greater or equal to 38.5C (101.3F)          Allergies    No Known Allergies    Intolerances                          12.3   8.13  )-----------( 274      ( 06 May 2024 06:18 )             40.2       Hemoglobin A1C:     Vitamin B12     RADIOLOGY    ASSESSMENT AND PLAN:      Seen for right sided weakness/ams  consistent subacute left mca infarct  also multiple punctate subacute cerebellar infarcts  most likely cardio-embolic    ON AC and asa  continue statin  patient would benefit from acute rehab  no need for permissive HTN   Physical therapy evaluation.  Pain is accessed and addressed.  Plan of care was discussed with family(wife_. Questions answered.  Would continue to follow.

## 2024-05-06 NOTE — SOCIAL WORK PROGRESS NOTE - NSSWPROGRESSNOTE_GEN_ALL_CORE
Discussed today at rounds. Case received after transfer from ICU to [facility]. PT and OT still recommending Acute rehab placement.  Updates sent to [facility]. I met with wife at bedside and  informed her they may deny him. [facility] remains her first choice. If denied by she will agree to referral to [facility]. I also encouraged to consider Sub-acute . She states he was at [facility] in the past and she was not impressed. Social work to follow.

## 2024-05-06 NOTE — PROGRESS NOTE ADULT - SUBJECTIVE AND OBJECTIVE BOX
CHIEF COMPLAINT/INTERVAL HISTORY:  Pt. seen and evaluated for acute CVA and afib.  Pt. is in no distress.  With some dysarthria.  Denies having CP or SOB.  Tolerating ASA and eliquis.      REVIEW OF SYSTEMS:  No fever, CP, SOB, or abdominal pain.      Vital Signs Last 24 Hrs  T(C): 36.5 (06 May 2024 04:52), Max: 37.2 (05 May 2024 11:55)  T(F): 97.7 (06 May 2024 04:52), Max: 99 (05 May 2024 11:55)  HR: 93 (06 May 2024 04:52) (87 - 110)  BP: 158/80 (06 May 2024 04:52) (113/53 - 158/80)  BP(mean): 92 (05 May 2024 21:00) (77 - 100)  RR: 16 (06 May 2024 04:52) (16 - 29)  SpO2: 92% (06 May 2024 04:52) (92% - 100%)    Parameters below as of 06 May 2024 04:52  Patient On (Oxygen Delivery Method): room air        PHYSICAL EXAM:  GENERAL: NAD  HEENT: EOMI, hearing normal, conjunctiva and sclera clear  Chest: diminished BS at bases, no wheezing  CV: S1S2, RRR,   GI: soft, +BS, NT/ND  Musculoskeletal: no LE edema  Neuro: +word finding difficulties and slurring of speech, 0/5 strength of RUE, 2/5 strength of RLE, 4/5 strength of LUE and LLE  Psychiatric: calm  Skin: warm and dry    LABS:                        12.3   8.13  )-----------( 274      ( 06 May 2024 06:18 )             40.2     05-05    144  |  109<H>  |  29<H>  ----------------------------<  304<H>  3.8   |  29  |  1.80<H>    Ca    9.3      05 May 2024 05:10  Phos  2.6     05-05  Mg     2.3     05-05        Urinalysis Basic - ( 05 May 2024 05:10 )    Color: x / Appearance: x / SG: x / pH: x  Gluc: 304 mg/dL / Ketone: x  / Bili: x / Urobili: x   Blood: x / Protein: x / Nitrite: x   Leuk Esterase: x / RBC: x / WBC x   Sq Epi: x / Non Sq Epi: x / Bacteria: x

## 2024-05-06 NOTE — PROGRESS NOTE ADULT - ASSESSMENT
83-year-old M with history of COPD on home O2 PRN, coronary artery disease s/p CABG,  hypertension, diabetes, hyperlipidemia, atrial fibrillation on Eliquis as well as chronic osteomyelitis who presents to the emergency department at [facility] complaining of rapid heart rate. Cardiology consulted for afib with rvr.    CAD, CABG, HTN, HLD, A fib  - Presenting with PAF with RVR  - At home he had noted some SOB, his wife checked his HR and noted it up was up to 160s so brought him to the ER, then placed on Cardizem gtt, had missed home BB   - on the morning of 4/27, patient was noted to have new onset aphasia and a code stroke was called.  He was deemed not a candidate for TNK as he is on AC.  He was found to have a L M3 occlusion but was deemed not a candidate for thrombectomy as occlusion too distal.    - Later that night, a RRT was called due to worsening NIH score noted by nursing.  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   - Continue aspirin and statin     - Pt w/ hypotension in ICU needing pressors, pressors stopped as MAP now improved on midodrine.  - Continue Midodrine   - -150s   - if bp still labile and has room would start on Lopressor 25mg q12.     - Tele w/ SR 80-100s   - Continue PO Amiodarone loading.   - Continue Eliquis 2.5mg BID. Dose reduced in the setting of renal function and age. Previous Cr of 1.4, if his renal function goes back to baseline of 1.4 would place on full dose Eliquis for CVA protection    - Monitor and replete lytes, keep K>4, Mg>2.  - Will continue to follow.    Elizabeth Negron, MS FNP, AGACNP  Nurse Practitioner- Cardiology   Please call on TEAMS

## 2024-05-06 NOTE — CONSULT NOTE ADULT - ASSESSMENT
Hypernatremia: decreased free water intake  MERRILL on CKD 3: Prerenal azotemia  Hypotension, on midodrine  Diabetes  Atrial fibrillation  CVA    IV hydration with D5W. Monitor sodium trend. Will lower midodrine dose. Monitor blood sugar levels. Insulin coverage as needed.   Monitor BP trend. Encourage PO intake as tolerated. Avoid nephrotoxic meds as possible. Will follow electrolytes and renal function trend.     Further recommendations pending clinical course. Thank you for the courtesy of this referral.

## 2024-05-07 NOTE — PROGRESS NOTE ADULT - ASSESSMENT
83-year-old  black male with history of COPD coronary artery disease hypertension diabetes hyperlipidemia atrial fibrillation on Eliquis as well as osteomyelitis who presents now to the emergency department at [facility] complaining of rapid heart rate    jacy  copd  AF  OP  OA  CAD  HTN  DM  HLD  OM hx  CVA     Pureed diet  renal eval noted  on cipro - suppression ABX  s/p Nucala for Asthma - as per home rx regimen  s/p CVA tx - CT head repeat noted -   transferred out of ICU    follows with Dr Ryland ashley med  uses NIV - BIPAP night time for JACY - sleep hygiene reviewed  cardio eval - [facility] rx regimen  BP control  DM care  AF rate and rhythm control  TFT  outpatient record reviewed  proventil PRN - Singulair - copd  spoke with WIFE  wound care

## 2024-05-07 NOTE — PROGRESS NOTE ADULT - SUBJECTIVE AND OBJECTIVE BOX
Neurology Follow up note    YUE [id number]    HPI:  83yoM PMHx AFib on Eliquis, CAD, HTN, DM, HLD, COPD, osteomyelitis presents c/o shortness of breath, chest discomfort. Pt reports onset of rapid heart rate this morning, HR measured 160s when he checked his pulse ox. Also endorses dyspnea exacerbated by movement. Pt reports last episode of AFib in 2020, no episodes since then until today's presentation. Pt states that chest discomfort feels like chest pressure, no chest pain. Pt also reports chronic R big toe wound for the past few months, follows w/ Wound Care. States that he has increased sensitivity to R sole of foot, but denies pain, numbness/tingling. Denies fevers, chills, abdominal pain, N/V/D/C.     IN THE ED:  Temp 98  F ,  , /81  ,RR 18 , SpO2 94%  S/P PO , IV diltiazem 10mg x2, IV diltiazem gtt @ 10 mg/hr  Labs significant for BUN/Cr 25/1.6, troponin 507.9, pBNP 355  Imaging:   CXR wet read: no gross abnormalities (25 Apr 2024 12:13)      Interval History no new events    Patient is seen, chart was reviewed and case was discussed with the treatment team.  Pt is not in any distress.   Lying on bed comfortably.     .    Vital Signs Last 24 Hrs  T(C): 36.8 (07 May 2024 11:30), Max: 36.8 (06 May 2024 19:43)  T(F): 98.3 (07 May 2024 11:30), Max: 98.3 (07 May 2024 04:24)  HR: 105 (07 May 2024 14:47) (92 - 105)  BP: 138/79 (07 May 2024 11:30) (138/79 - 169/79)  BP(mean): --  RR: 19 (07 May 2024 11:30) (19 - 19)  SpO2: 94% (07 May 2024 14:47) (91% - 100%)    Parameters below as of 07 May 2024 14:47  Patient On (Oxygen Delivery Method): room air                                    REVIEW OF SYSTEMS:    Constitutional: No fever, weight loss or fatigue  Eyes: No eye pain, visual disturbances, or discharge  ENT:  No difficulty hearing, tinnitus, vertigo; No sinus or throat pain  Neck: No pain or stiffness  Respiratory: No wheezing, chills or hemoptysis  Cardiovascular: No chest pain, palpitations, shortness of breath, dizziness  Gastrointestinal: No abdominal or epigastric pain. No nausea, vomiting or hematemesis  Genitourinary: No dysuria, frequency, hematuria or incontinence  Neurological: No headaches, numbness or tremors  Psychiatric: No depression, anxiety, mood swings or difficulty sleeping  Musculoskeletal: No joint pain or swelling; No muscle, back or extremity pain  Skin: No itching, burning, rashes or lesions   Lymph Nodes: No enlarged glands  Endocrine: No heat or cold intolerance; No hair loss,   Allergy and Immunologic: No hives or eczema    On Neurological Examination:    Mental Status - Pt is alert, awake, oriented X3.. Follows commands well and able to answer questions appropriately.Mood and affect  normal    Speech -  MIXED APHASIA    Cranial Nerves - Pupils 3 mm equal and reactive to light, extraocular eye movements intact. Pt has no visual field deficit.  Pt has right facial weakness  . Facial sensation is intact.Tongue - is in midline.    Muscle tone - is decreased on right  .      Motor Exam -right hemiparesis; RUE 1/5; LE 1-2/5   No shaking or tremors.    Sensory Exam -. Pt withdraws all extremities equally on stimulation. No asymmetry seen. No complaints of tingling, numbness.        coordination:    Finger to nose: normal-left        Deep tendon Reflexes - 2 plus all over.            Neck Supple -  Yes.     MEDICATIONS  (STANDING):  albuterol/ipratropium for Nebulization 3 milliLiter(s) Nebulizer every 6 hours  aMIOdarone    Tablet 400 milliGRAM(s) Oral every 8 hours  aMIOdarone    Tablet 400  Oral   apixaban 2.5 milliGRAM(s) Oral every 12 hours  aspirin  chewable 81 milliGRAM(s) Enteral Tube daily  atorvastatin 80 milliGRAM(s) Oral at bedtime  buDESOnide    Inhalation Suspension 0.5 milliGRAM(s) Inhalation two times a day  ciprofloxacin   IVPB 400 milliGRAM(s) IV Intermittent every 12 hours  insulin lispro (ADMELOG) corrective regimen sliding scale   SubCutaneous every 6 hours  midodrine 10 milliGRAM(s) Oral every 8 hours    MEDICATIONS  (PRN):  acetaminophen   Oral Liquid .. 650 milliGRAM(s) Oral every 6 hours PRN Temp greater or equal to 38.5C (101.3F)          Allergies    No Known Allergies    Intolerances                          12.1   7.58  )-----------( 295      ( 07 May 2024 07:34 )             38.6         Hemoglobin A1C:     Vitamin B12     RADIOLOGY    ASSESSMENT AND PLAN:      Seen for right sided weakness/ams  consistent subacute left mca infarct  also multiple punctate subacute cerebellar infarcts  most likely cardio-embolic    no further neuro w/u  ON AC and asa  continue statin  patient would benefit from acute rehab  no need for permissive HTN   Physical therapy evaluation.  Pain is accessed and addressed.  Plan of care was discussed with family(wife_. Questions answered.  Would continue to follow.

## 2024-05-07 NOTE — PROGRESS NOTE ADULT - SUBJECTIVE AND OBJECTIVE BOX
Date/Time Patient Seen:  		  Referring MD:   Data Reviewed	       Patient is a 83y old  Male who presents with a chief complaint of AFib w/ RVR (06 May 2024 13:02)      Subjective/HPI     PAST MEDICAL & SURGICAL HISTORY:  Diabetes Mellitus, Type II    CAD (Coronary Artery Disease)  s/p 3v CABG 2004; stents placed in [place] in 2019    Dyslipidemia    Asymptomatic Peripheral Vascular Disease    Osteomyelitis    COPD (chronic obstructive pulmonary disease)  on 2L at home and BiPAP at night; intubated 6/18    Diabetes mellitus    Hypertension    PVD (peripheral vascular disease)    History of PAT (paroxysmal atrial tachycardia)    Asthma with COPD    BPH (benign prostatic hyperplasia)    Acute osteomyelitis    CABG (Coronary Artery Bypass Graft)  2004    Compound fracture  left leg    S/P primary angioplasty with coronary stent    H/O drainage of abscess  Left femur 12/2021          Medication list         MEDICATIONS  (STANDING):  albuterol/ipratropium for Nebulization 3 milliLiter(s) Nebulizer every 6 hours  aMIOdarone    Tablet 400  Oral   aMIOdarone    Tablet 200 milliGRAM(s) Oral daily  apixaban 2.5 milliGRAM(s) Oral every 12 hours  aspirin  chewable 81 milliGRAM(s) Enteral Tube daily  atorvastatin 80 milliGRAM(s) Oral at bedtime  buDESOnide    Inhalation Suspension 0.5 milliGRAM(s) Inhalation two times a day  ciprofloxacin   IVPB 400 milliGRAM(s) IV Intermittent every 12 hours  dextrose 10% Bolus 125 milliLiter(s) IV Bolus once  dextrose 5%. 1000 milliLiter(s) (75 mL/Hr) IV Continuous <Continuous>  dextrose 5%. 1000 milliLiter(s) (50 mL/Hr) IV Continuous <Continuous>  dextrose 5%. 1000 milliLiter(s) (100 mL/Hr) IV Continuous <Continuous>  dextrose 50% Injectable 25 Gram(s) IV Push once  dextrose 50% Injectable 12.5 Gram(s) IV Push once  glucagon  Injectable 1 milliGRAM(s) IntraMuscular once  insulin glargine Injectable (LANTUS) 15 Unit(s) SubCutaneous at bedtime  insulin lispro (ADMELOG) corrective regimen sliding scale   SubCutaneous at bedtime  insulin lispro (ADMELOG) corrective regimen sliding scale   SubCutaneous three times a day before meals  midodrine 2.5 milliGRAM(s) Oral every 8 hours  montelukast 10 milliGRAM(s) Oral daily    MEDICATIONS  (PRN):  acetaminophen   Oral Liquid .. 650 milliGRAM(s) Oral every 6 hours PRN Temp greater or equal to 38.5C (101.3F)  dextrose Oral Gel 15 Gram(s) Oral once PRN Blood Glucose LESS THAN 70 milliGRAM(s)/deciliter         Vitals log        ICU Vital Signs Last 24 Hrs  T(C): 36.8 (07 May 2024 04:24), Max: 36.8 (06 May 2024 19:43)  T(F): 98.3 (07 May 2024 04:24), Max: 98.3 (07 May 2024 04:24)  HR: 105 (07 May 2024 04:24) (81 - 105)  BP: 169/79 (07 May 2024 04:24) (138/74 - 169/79)  BP(mean): --  ABP: --  ABP(mean): --  RR: 19 (07 May 2024 04:24) (19 - 19)  SpO2: 95% (07 May 2024 04:24) (93% - 100%)    O2 Parameters below as of 07 May 2024 04:24  Patient On (Oxygen Delivery Method): room air                 Input and Output:  I&O's Detail    05 May 2024 07:01  -  06 May 2024 07:00  --------------------------------------------------------  IN:    Free Water: 50 mL    IV PiggyBack: 400 mL    Oral Fluid: 130 mL  Total IN: 580 mL    OUT:    Incontinent per Condom Catheter (mL): 700 mL  Total OUT: 700 mL    Total NET: -120 mL      06 May 2024 07:01  -  07 May 2024 05:43  --------------------------------------------------------  IN:    dextrose 5%: 180 mL    dextrose 5%: 525 mL  Total IN: 705 mL    OUT:    Incontinent per Condom Catheter (mL): 1400 mL  Total OUT: 1400 mL    Total NET: -695 mL          Lab Data                        12.3   8.13  )-----------( 274      ( 06 May 2024 06:18 )             40.2     05-06    147<H>  |  113<H>  |  25<H>  ----------------------------<  188<H>  4.2   |  25  |  1.60<H>    Ca    9.8      06 May 2024 06:18  Phos  2.9     05-06  Mg     2.1     05-06              Review of Systems	      Objective     Physical Examination    heart s1s2  lung dec BS  head nc      Pertinent Lab findings & Imaging      Eliecer:  NO   Adequate UO     I&O's Detail    05 May 2024 07:01  -  06 May 2024 07:00  --------------------------------------------------------  IN:    Free Water: 50 mL    IV PiggyBack: 400 mL    Oral Fluid: 130 mL  Total IN: 580 mL    OUT:    Incontinent per Condom Catheter (mL): 700 mL  Total OUT: 700 mL    Total NET: -120 mL      06 May 2024 07:01  -  07 May 2024 05:43  --------------------------------------------------------  IN:    dextrose 5%: 180 mL    dextrose 5%: 525 mL  Total IN: 705 mL    OUT:    Incontinent per Condom Catheter (mL): 1400 mL  Total OUT: 1400 mL    Total NET: -695 mL               Discussed with:     Cultures:	        Radiology

## 2024-05-07 NOTE — PROGRESS NOTE ADULT - ASSESSMENT
83-year-old M with history of COPD on home O2 PRN, coronary artery disease s/p CABG,  hypertension, diabetes, hyperlipidemia, atrial fibrillation on Eliquis as well as chronic osteomyelitis who presents to the emergency department at [facility] complaining of rapid heart rate. Cardiology consulted for afib with rvr.    CAD, CABG, HTN, HLD, A fib  - Presenting with PAF with RVR  - At home he had noted some SOB, his wife checked his HR and noted it up was up to 160s so brought him to the ER, then placed on Cardizem gtt, had missed home BB   - on the morning of 4/27, patient was noted to have new onset aphasia and a code stroke was called.  He was deemed not a candidate for TNK as he is on AC.  He was found to have a L M3 occlusion but was deemed not a candidate for thrombectomy as occlusion too distal.    - Later that night, a RRT was called due to worsening NIH score noted by nursing.  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   - Continue aspirin and statin     - Tele w/ SR 80-100s   - Continue PO Amiodarone loading.   - Continue Eliquis 2.5mg BID. Dose reduced in the setting of renal function and age. Previous Cr of 1.4, if his renal function goes back to baseline of 1.4 would place on full dose Eliquis for CVA protection  - Pt w/ hypotension in ICU needing pressors, pressors stopped as MAP now improved on midodrine.  - -160s   - Would start on Lopressor 25mg q12.     - Monitor and replete lytes, keep K>4, Mg>2.  - Will continue to follow.    Elizabeth Negron, MS FNP, AGACNP  Nurse Practitioner- Cardiology   Please call on TEAMS

## 2024-05-07 NOTE — PROGRESS NOTE ADULT - ASSESSMENT
83yoM PMHx AFib on Eliquis, CAD, HTN, DM, HLD, COPD, osteomyelitis presents c/o shortness of breath, chest discomfort. Admitted for AFib w/ RVR. Hospital course c/b CVA on 4/27/2024. 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      Problem/Plan - 1:  ·  Problem: Cerebrovascular accident (CVA).   ·  Plan: - MRI Head: Multiple acute infarcts scattered throughout the bilateral cerebral and cerebellar hemispheres. More dominant acute to subacute infarction in the posterior left MCA vascular distribution. Chronic small vessel disease.  - No evidence of hemorrhagic conversion -repeat head CT from May 1 with no acute findings  - Continue Eliquis 2.5mg PO Q12h  - Weaned off phenylephrine.  Will discontinue midodrine   - Continue aspirin 81mg PO daily and Lipitor 80mg PO QHS  - Neuro checks per protocol  - Pt. pulled out NGT on 5/5.  Now on pureed and moderately thick liquids  - s/p MBS  - Fall and aspiration precautions   - Neurology f/u     Problem/Plan - 2:  ·  Problem: Atrial fibrillation with RVR.   ·  Plan: History of Afib, on home Metoprolol BID and Eliquis   - TTE 4/26/2024: LV no well visualized however LV probably normal based on limited views, moderate thickening of aortic valve leaflets, trace TR, dilated IVC with normal inspiratory collapse, normal pulmonary artery pressure  - Resumed AC  - Continue po amiodarone  - Telemetry monitoring, electrolyte monitoring  - Cardiology f/u     Problem/Plan - 3:  ·  Problem: Elevated troponin.   ·  Plan: Elevated trop on admission likely 2/2 Afib w/ RVR   - TTE 4/26/2024: technically difficult study, LV not well visualized however LV probably normal based on limited views, moderate thickening of aortic valve leaflets, trace TR, dilated IVC with normal inspiratory collapse, normal pulmonary artery pressure  - Trop x2 both sets elevated but downtrended   - No complaints of chest pain, low suspicions of ACS --> monitor for signs and symptoms of ischemia   - Cardio following     Problem/Plan - 4:  ·  Problem: Open toe wound.   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   ·  Plan: MERRILL on admission. Baseline Cr around 1.1  - On admission Cr 1.6   - Monitor volume status closely  - Avoid nephrotoxics - hold home furosemide for now  - Renal indices have been stable.  - On D5W@75cc/hr for hypernatremia  - Nephrology f/u     Problem/Plan - 6:  ·  Problem: CAD (coronary artery disease).   ·  Plan: Chronic  - continue asa/statin  - Lipid panel within normal     Problem/Plan - 7:  ·  Problem: HTN (hypertension).   ·  Plan: Chronic  - DC phenylephrine.  titrate off midodrine.  - Lopressor initially discontinued to allow for permissive HTN.  Consider restarting pending BP trend.    - Monitor routine hemodynamics     Problem/Plan - 8:  ·  Problem: Type 2 diabetes mellitus.   ·  Plan: Chronic, on home insulin (Lantus 10u)  - continue Lantus 15 units sQ QHS  - moderate dose sliding scale  - hypoglycemia protocol in place, fingersticks q ac, q hs   - goal -180 in hospital setting, adjust insulin regimen prn  - HbA1c 6.8     Problem/Plan - 9:  ·  Problem: COPD (chronic obstructive pulmonary disease).   ·  Plan: Chronic, baseline 2L home O2 and BiPAP qHS   - resume home montelukast   - Continue Bipap overnight   - Continue Pulmicort inh BID and Duoneb tx Q6h  - Pulm Dr. Combs consulted     Problem/Plan - 10:  ·  Problem: Need for prophylactic measure.   ·  Plan; #VTE ppx: Eliquis  #Diet: pureed with moderately thick liquids  #Activity: Activity - Increase As Tolerated:   Time/Priority:  Routine (04-27-24 @ 22:51) [Active]  #ACP: Full code  #Dispo: further plans pending clinical course.

## 2024-05-07 NOTE — PROGRESS NOTE ADULT - ASSESSMENT
Hypernatremia: decreased free water intake  MERRILL on CKD 3: Prerenal azotemia  Hypotension, on midodrine  Diabetes  Atrial fibrillation  CVA  Hypokalemia    Improving sodium levels and renal indices. Will lower IVF. Monitor blood sugar levels. Insulin coverage as needed. Potassium supplementation.   Monitor BP trend. Encourage PO intake as tolerated. Avoid nephrotoxic meds as possible. Will follow electrolytes and renal function trend.   Discussed with patients wife at the bedside.

## 2024-05-07 NOTE — PROGRESS NOTE ADULT - SUBJECTIVE AND OBJECTIVE BOX
CHIEF COMPLAINT/INTERVAL HISTORY:  Pt. seen and evaluated for acute CVA and afib.  Pt. is in no distress.  Denies having CP or SOB.  Tolerating ASA and Eliquis.      REVIEW OF SYSTEMS:  No fever, CP, SOB, or abdominal pain    Vital Signs Last 24 Hrs  T(C): 36.8 (07 May 2024 04:24), Max: 36.8 (06 May 2024 19:43)  T(F): 98.3 (07 May 2024 04:24), Max: 98.3 (07 May 2024 04:24)  HR: 98 (07 May 2024 08:21) (81 - 105)  BP: 169/79 (07 May 2024 04:24) (138/74 - 169/79)  BP(mean): --  RR: 19 (07 May 2024 04:24) (19 - 19)  SpO2: 92% (07 May 2024 08:21) (92% - 100%)    Parameters below as of 07 May 2024 08:21  Patient On (Oxygen Delivery Method): room air        PHYSICAL EXAM:  GENERAL: NAD  HEENT: EOMI, hearing normal, conjunctiva and sclera clear  Chest: diminished BS at bases, no wheezing  CV: S1S2, RRR,   GI: soft, +BS, NT/ND  Musculoskeletal: no LE edema  Neuro: +dysarthria, right sided paralysis, 4/5 strength of left UE and LE  Psychiatric: calm  Skin: warm and dry    LABS:                        12.1   7.58  )-----------( 295      ( 07 May 2024 07:34 )             38.6     05-06    147<H>  |  113<H>  |  25<H>  ----------------------------<  188<H>  4.2   |  25  |  1.60<H>    Ca    9.8      06 May 2024 06:18  Phos  2.9     05-06  Mg     2.1     05-06        Urinalysis Basic - ( 06 May 2024 06:18 )    Color: x / Appearance: x / SG: x / pH: x  Gluc: 188 mg/dL / Ketone: x  / Bili: x / Urobili: x   Blood: x / Protein: x / Nitrite: x   Leuk Esterase: x / RBC: x / WBC x   Sq Epi: x / Non Sq Epi: x / Bacteria: x

## 2024-05-07 NOTE — PROGRESS NOTE ADULT - SUBJECTIVE AND OBJECTIVE BOX
Patient is a 83y old  Male who presents with a chief complaint of AFib w/ RVR (07 May 2024 08:54)  Patient seen in follow up for hypernatremia, MERRILL.        PAST MEDICAL HISTORY:  Diabetes Mellitus, Type II    CAD (Coronary Artery Disease)    Dyslipidemia    Asymptomatic Peripheral Vascular Disease    Osteomyelitis    COPD (chronic obstructive pulmonary disease)    Diabetes mellitus    Hypertension    PVD (peripheral vascular disease)    History of PAT (paroxysmal atrial tachycardia)    Asthma with COPD    BPH (benign prostatic hyperplasia)    Acute osteomyelitis      MEDICATIONS  (STANDING):  albuterol/ipratropium for Nebulization 3 milliLiter(s) Nebulizer every 6 hours  aMIOdarone    Tablet 400  Oral   aMIOdarone    Tablet 200 milliGRAM(s) Oral daily  apixaban 2.5 milliGRAM(s) Oral every 12 hours  aspirin  chewable 81 milliGRAM(s) Enteral Tube daily  atorvastatin 80 milliGRAM(s) Oral at bedtime  buDESOnide    Inhalation Suspension 0.5 milliGRAM(s) Inhalation two times a day  ciprofloxacin   IVPB 400 milliGRAM(s) IV Intermittent every 12 hours  dextrose 10% Bolus 125 milliLiter(s) IV Bolus once  dextrose 5%. 1000 milliLiter(s) (75 mL/Hr) IV Continuous <Continuous>  dextrose 5%. 1000 milliLiter(s) (100 mL/Hr) IV Continuous <Continuous>  dextrose 5%. 1000 milliLiter(s) (50 mL/Hr) IV Continuous <Continuous>  dextrose 50% Injectable 25 Gram(s) IV Push once  dextrose 50% Injectable 12.5 Gram(s) IV Push once  glucagon  Injectable 1 milliGRAM(s) IntraMuscular once  insulin glargine Injectable (LANTUS) 15 Unit(s) SubCutaneous at bedtime  insulin lispro (ADMELOG) corrective regimen sliding scale   SubCutaneous at bedtime  insulin lispro (ADMELOG) corrective regimen sliding scale   SubCutaneous three times a day before meals  montelukast 10 milliGRAM(s) Oral daily  potassium chloride    Tablet ER 40 milliEquivalent(s) Oral every 4 hours    MEDICATIONS  (PRN):  acetaminophen   Oral Liquid .. 650 milliGRAM(s) Oral every 6 hours PRN Temp greater or equal to 38.5C (101.3F)  dextrose Oral Gel 15 Gram(s) Oral once PRN Blood Glucose LESS THAN 70 milliGRAM(s)/deciliter    T(C): 36.8 (05-07-24 @ 11:30), Max: 36.8 (05-06-24 @ 19:43)  HR: 105 (05-07-24 @ 11:30) (81 - 105)  BP: 138/79 (05-07-24 @ 11:30) (138/74 - 169/79)  RR: 19 (05-07-24 @ 11:30) (16 - 19)  SpO2: 91% (05-07-24 @ 11:30) (91% - 100%)  Wt(kg): --  I&O's Detail    06 May 2024 07:01  -  07 May 2024 07:00  --------------------------------------------------------  IN:    dextrose 5%: 180 mL    dextrose 5%: 525 mL  Total IN: 705 mL    OUT:    Incontinent per Condom Catheter (mL): 1400 mL  Total OUT: 1400 mL    Total NET: -695 mL          PHYSICAL EXAM:  General: No distress  Respiratory: b/l air entry  Cardiovascular: S1 S2  Gastrointestinal: soft  Extremities:  + edema                              12.1   7.58  )-----------( 295      ( 07 May 2024 07:34 )             38.6     05-07    141  |  106  |  19  ----------------------------<  233<H>  3.1<L>   |  31  |  1.50<H>    Ca    9.2      07 May 2024 07:34  Phos  2.8     05-07  Mg     2.1     05-07            Urinalysis Basic - ( 07 May 2024 07:34 )    Color: x / Appearance: x / SG: x / pH: x  Gluc: 233 mg/dL / Ketone: x  / Bili: x / Urobili: x   Blood: x / Protein: x / Nitrite: x   Leuk Esterase: x / RBC: x / WBC x   Sq Epi: x / Non Sq Epi: x / Bacteria: x        Sodium, Serum: 141 (05-07 @ 07:34)  Sodium, Serum: 147 (05-06 @ 06:18)  Sodium, Serum: 144 (05-05 @ 05:10)  Sodium, Serum: 144 (05-04 @ 05:25)    Creatinine, Serum: 1.50 (05-07 @ 07:34)  Creatinine, Serum: 1.60 (05-06 @ 06:18)  Creatinine, Serum: 1.80 (05-05 @ 05:10)  Creatinine, Serum: 1.80 (05-04 @ 05:25)    Potassium, Serum: 3.1 (05-07 @ 07:34)  Potassium, Serum: 4.2 (05-06 @ 06:18)  Potassium, Serum: 3.8 (05-05 @ 05:10)  Potassium, Serum: 4.0 (05-04 @ 05:25)    Hemoglobin: 12.1 (05-07 @ 07:34)  Hemoglobin: 12.3 (05-06 @ 06:18)  Hemoglobin: 12.1 (05-05 @ 05:10)  Hemoglobin: 11.8 (05-04 @ 05:25)

## 2024-05-07 NOTE — PROGRESS NOTE ADULT - SUBJECTIVE AND OBJECTIVE BOX
[facility] -- Génesis Villavicencio Pannella, Patel, Carolina Heath Cohen: Office # [phone]    Follow Up:  CAD, AF, CVA     Subjective/Observations: Patient seen and examined. Events noted. Resting  in bed. More alert today No complaints of chest pain, dyspnea, or palpitations reported. No signs of orthopnea or PND. Tolerating room air. IVF @ 75    REVIEW OF SYSTEMS: All other review of systems are negative unless indicated above    PAST MEDICAL & SURGICAL HISTORY:  Diabetes Mellitus, Type II      CAD (Coronary Artery Disease)  s/p 3v CABG 2004; stents placed in [place] in 2019      Dyslipidemia      Osteomyelitis      COPD (chronic obstructive pulmonary disease)  on 2L at home and BiPAP at night; intubated 6/18      Hypertension      Hypertension      PVD (peripheral vascular disease)      History of PAT (paroxysmal atrial tachycardia)      Asthma with COPD      BPH (benign prostatic hyperplasia)      Acute osteomyelitis      CABG (Coronary Artery Bypass Graft)  2004      Compound fracture  left leg      S/P primary angioplasty with coronary stent      H/O drainage of abscess  Left femur 12/2021          MEDICATIONS  (STANDING):  albuterol/ipratropium for Nebulization 3 milliLiter(s) Nebulizer every 6 hours  aMIOdarone    Tablet 400  Oral   aMIOdarone    Tablet 200 milliGRAM(s) Oral daily  apixaban 2.5 milliGRAM(s) Oral every 12 hours  aspirin  chewable 81 milliGRAM(s) Enteral Tube daily  atorvastatin 80 milliGRAM(s) Oral at bedtime  buDESOnide    Inhalation Suspension 0.5 milliGRAM(s) Inhalation two times a day  ciprofloxacin   IVPB 400 milliGRAM(s) IV Intermittent every 12 hours  dextrose 10% Bolus 125 milliLiter(s) IV Bolus once  dextrose 5%. 1000 milliLiter(s) (50 mL/Hr) IV Continuous <Continuous>  dextrose 5%. 1000 milliLiter(s) (75 mL/Hr) IV Continuous <Continuous>  dextrose 5%. 1000 milliLiter(s) (100 mL/Hr) IV Continuous <Continuous>  dextrose 50% Injectable 25 Gram(s) IV Push once  dextrose 50% Injectable 12.5 Gram(s) IV Push once  glucagon  Injectable 1 milliGRAM(s) IntraMuscular once  insulin glargine Injectable (LANTUS) 15 Unit(s) SubCutaneous at bedtime  insulin lispro (ADMELOG) corrective regimen sliding scale   SubCutaneous at bedtime  insulin lispro (ADMELOG) corrective regimen sliding scale   SubCutaneous three times a day before meals  montelukast 10 milliGRAM(s) Oral daily  potassium chloride    Tablet ER 40 milliEquivalent(s) Oral every 4 hours    MEDICATIONS  (PRN):  acetaminophen   Oral Liquid .. 650 milliGRAM(s) Oral every 6 hours PRN Temp greater or equal to 38.5C (101.3F)  dextrose Oral Gel 15 Gram(s) Oral once PRN Blood Glucose LESS THAN 70 milliGRAM(s)/deciliter    Allergies    No Known Allergies    Intolerances      Vital Signs Last 24 Hrs  T(C): 36.8 (07 May 2024 11:30), Max: 36.8 (06 May 2024 19:43)  T(F): 98.3 (07 May 2024 11:30), Max: 98.3 (07 May 2024 04:24)  HR: 105 (07 May 2024 11:30) (81 - 105)  BP: 138/79 (07 May 2024 11:30) (138/74 - 169/79)  BP(mean): --  RR: 19 (07 May 2024 11:30) (19 - 19)  SpO2: 91% (07 May 2024 11:30) (91% - 100%)    Parameters below as of 07 May 2024 11:30  Patient On (Oxygen Delivery Method): room air      I&O's Summary    06 May 2024 07:01  -  07 May 2024 07:00  --------------------------------------------------------  IN: 705 mL / OUT: 1400 mL / NET: -695 mL    TELE: SR, PVC 90-100s   PHYSICAL EXAM:  Constitutional: NAD, awake and alert  HEENT: Moist Mucous Membranes, Anicteric  Pulmonary: Non-labored, breath sounds are clear bilaterally, Diminished at bases No wheezing, rales or rhonchi  Cardiovascular: Regular, S1 and S2, + murmurs, No rubs, gallops or clicks  Gastrointestinal:  soft, nontender, nondistended   Lymph: +1 peripheral edema. No lymphadenopathy.   Skin: No visible rashes or ulcers.  Psych:  Mood & affect appropriate    LABS: All Labs Reviewed:                        12.1   7.58  )-----------( 295      ( 07 May 2024 07:34 )             38.6                         12.3   8.13  )-----------( 274      ( 06 May 2024 06:18 )             40.2                         12.1   10.20 )-----------( 266      ( 05 May 2024 05:10 )             38.9     07 May 2024 07:34    141    |  106    |  19     ----------------------------<  233    3.1     |  31     |  1.50   06 May 2024 06:18    147    |  113    |  25     ----------------------------<  188    4.2     |  25     |  1.60   05 May 2024 05:10    144    |  109    |  29     ----------------------------<  304    3.8     |  29     |  1.80     Ca    9.2        07 May 2024 07:34  Ca    9.8        06 May 2024 06:18  Ca    9.3        05 May 2024 05:10  Phos  2.8       07 May 2024 07:34  Phos  2.9       06 May 2024 06:18  Phos  2.6       05 May 2024 05:10  Mg     2.1       07 May 2024 07:34  Mg     2.1       06 May 2024 06:18  Mg     2.3       05 May 2024 05:10       Troponin I, High Sensitivity Result: 617.0 ng/L (04-27-24 @ 14:47)  Triiodothyronine, Total (T3 Total): 112 ng/dL (04-25-24 @ 15:45)  Cholesterol: 124 mg/dL (04-26-24 @ 08:05)  HDL Cholesterol: 48 mg/dL (04-26-24 @ 08:05)  Triglycerides, Serum: 93 mg/dL (04-26-24 @ 08:05)    12 Lead ECG:   Ventricular Rate 95 BPM    Atrial Rate 95 BPM    P-R Interval 136 ms    QRS Duration 86 ms    Q-T Interval 352 ms    QTC Calculation(Bazett) 442 ms    P Axis 84 degrees    R Axis 88 degrees    T Axis 58 degrees    Diagnosis Line Normal sinus rhythm  Low voltage QRS  possible BREANNA  Borderline ECG    Confirmed by Cookie Ordaz ([id number]) on 5/2/2024 2:38:25 PM (05-02-24 @ 09:49)      TRANSTHORACIC ECHOCARDIOGRAM REPORT  ________________________________________________________________________________                                      _______       Pt. Name:       YUE COTTO Study Date:    4/26/2024  MRN:            [id number]            YOB: 1940  Accession #:    [id number]           Age:           83 years  Account#:       [id number]          Gender:        M  Visit ID#  Heart Rate:                         Height:        70.08 in (178.00 cm)  Rhythm:                            Weight:        220.46 lb (100.00 kg)  Blood Pressure: 158/69 mmHg         BSA/BMI:       2.18 m² / 31.56 kg/m²  ________________________________________________________________________________________  Referring Physician:   [id number] [facility]  Interpreting Physician: Cookie Ordaz MD  Primary Sonographer:    Clayton Wilkinson    CPT:               ECHO TTE WO CON COMP W DOPP - 22054.m  Indication(s):     Unspecified atrial fibrillation - I48.91  Procedure:         Transthoracic echocardiogram with 2-D, M-mode and complete                     spectral and color flow Doppler.  Ordering Location: [facility]  Admission Status:  Inpatient  Study Information: Image quality for this study is technically difficult.    _______________________________________________________________________________________     CONCLUSIONS:      1. Technically difficult image quality.   2. The LV is not well visualized, however the LV function is probably normal based on limited views.   3. The right ventricle is not well visualized. probably normal systolic function.   4. There is moderate thickening of the aortic valve leaflets.   5. Structurally normal mitral valve with normal leaflet excursion.   6. Trace tricuspid regurgitation.   7. The inferior vena cava is dilated measuring 2.18 cm in diameter, (dilated >2.1cm) with normal inspiratory collapse (normal >50%) consistent with mildly elevated right atrial pressure (~8, range 5-10mmHg).   8. Estimated pulmonary artery systolic pressure is 26 mmHg, consistent with normal pulmonary artery pressure.    ________________________________________________________________________________________  FINDINGS:     Left Ventricle:  There is normal left ventricular diastolic function. Left ventricular endocardium is not well visualized.     Right Ventricle:  The right ventricle is not well visualized. Probably normal systolic function.     Left Atrium:  The left atrium is normal in size with an indexed volume of 16.30 ml/m².     Right Atrium:  The right atrium is normal in size.     Aortic Valve:  The aortic valve anatomy cannot be determined with normal systolic excursion. There is mild calcification of the aortic valve leaflets. There is moderate thickening of the aortic valve leaflets.     Mitral Valve:  Structurally normal mitral valve with normal leaflet excursion. There is trace mitral regurgitation.     Tricuspid Valve:  The tricuspid valve was not well visualized. There is trace tricuspid regurgitation. Estimated pulmonary artery systolic pressure is 26 mmHg, consistent with normal pulmonary artery pressure.     Pulmonic Valve:  The pulmonic valve was not well visualized.     Aorta:  The aortic root at the sinuses of Valsalva is normal in size, measuring 3.30 cm (indexed 1.52 cm/m²).     Systemic Veins:  The inferior vena cava is dilated measuring 2.18 cm in diameter, (dilated >2.1cm) with normal inspiratory collapse (normal >50%) consistent with mildly elevated right atrial pressure (~8, range 5-10mmHg).  ____________________________________________________________________  QUANTITATIVE DATA:  Left Ventricle Measurements: (Indexed to BSA)     IVSd (2D):   1.1 cm  LVPWd (2D):  0.9 cm  LVIDd (2D):  4.0 cm  LVIDs (2D):  2.8 cm  LV Mass:     129 g  59.1 g/m²     MVE Vmax:    0.59 m/s  MV A Vmax:    0.85 m/s  MV E/A:       0.69  e' lateral:   6.96 cm/s  e' medial:    5.98 cm/s  E/e' lateral: 8.43  E/e' medial:  9.82  E/e' Average: 9.07  MV DT:        195 msec    Aorta Measurements: (Normal range) (Indexed to BSA)     Sinuses of Valsalva: 3.30 cm (3.1 - 3.7 cm)       Left Atrium Measurements: (Indexed to BSA)  LA Diam 2D: 3.30 cm       LVOT / RVOT/ Qp/Qs Data: (Indexed to BSA)  LVOT Diameter: 1.80 cm  LVOT Area:     2.54 cm²    Mitral Valve Measurements:     MV E Vmax: 0.6 m/s  MV A Vmax: 0.8 m/s  MV E/A:    0.7       Tricuspid Valve Measurements:     TR Vmax:          2.1 m/s  TR Peak Gradient: 18.1 mmHg  RA Pressure:      8 mmHg  PASP:             26 mmHg    ________________________________________________________________________________________  Electronically signed on 4/26/2024 at 4:46:45 PM by Cookie Ordaz MD    *** Final ***

## 2024-05-07 NOTE — SOCIAL WORK PROGRESS NOTE - NSSWPROGRESSNOTE_GEN_ALL_CORE
Discussed at daily rounds. [facility] denied for acute rehab based on yesterday's updates. Still waiting for response from [facility]. Referral sent to [facility]. I met with wife at bedside today after he worked with PT and OT. They confirmed he did better today. Wife informed of denial. Informed her I will send today's notes to see if [facility] will reconsider as that is her first choice. She is agreeing to referral to [facility] for sub-acute if acutes deny him. KODI requested. Social work to follow.

## 2024-05-08 NOTE — PROGRESS NOTE ADULT - PROBLEM SELECTOR PLAN 2
Chronic, on home Metoprolol BID and Eliquis   - TTE 4/26/2024: LV no well visualized however LV probably normal based on limited views, moderate thickening of aortic valve leaflets, trace TR, dilated IVC with normal inspiratory collapse, normal pulmonary artery pressure  - c/w Eliquis and PO amiodarone  - Home metoprolol resumed at half dose, lopressor 25mg BID  - Continue to monitor on tele  - Cardiology ([facility]) following, recs appreciated

## 2024-05-08 NOTE — PROGRESS NOTE ADULT - ASSESSMENT
Hypernatremia: decreased free water intake  MERRILL on CKD 3: Prerenal azotemia  Hypotension, on midodrine  Diabetes  Atrial fibrillation  CVA  Hypokalemia    Chart reviewed. Improving sodium levels and renal indices. Off IVF. Monitor blood sugar levels. Insulin coverage as needed. PRN Potassium supplementation.   Monitor BP trend. Encourage PO intake as tolerated. Avoid nephrotoxic meds as possible. Will follow electrolytes and renal function trend.

## 2024-05-08 NOTE — PROGRESS NOTE ADULT - PROBLEM SELECTOR PLAN 1
MRI Head: Posterior left MCA vascular ischemic stroke. No hemorrhagic conversion, repeat CTs w/ no acute change  - Most likely cardio-embolic, c/w eliquis  - C/w ASA and lipitor qd  - Neuro checks per protocol, appears more lethargic today. Continue to monitor, may need repeat CT  - Tolerating pureed diet  - Fall and aspiration precautions   - Palliative Care Consulted, recs appreciated

## 2024-05-08 NOTE — PROGRESS NOTE ADULT - PROBLEM SELECTOR PLAN 6
Chronic, baseline 2L home O2 and BiPAP qHS   - c/w home montelukast   - c/w Bipap overnight, hold PRN for increased secretions  - c/w Pulmicort BID and Duoneb Q6h  - Pulm (Dr. Combs) following, recs appreciated

## 2024-05-08 NOTE — PROGRESS NOTE ADULT - PROBLEM SELECTOR PLAN 4
Chronic, CKD3 baseline Cr 1.1  - Cr stable, continue to monitor  - Encourage PO intake  - Continue to hold home furosemide  - Hypernatremia resolved, D5w d/c'ed 2/2 hyperglycemia  - Monitor volume status closely  - Nephrology (Dr. Regan) following, recs appreciated

## 2024-05-08 NOTE — PROGRESS NOTE ADULT - ASSESSMENT
83-year-old  black male with history of COPD coronary artery disease hypertension diabetes hyperlipidemia atrial fibrillation on Eliquis as well as osteomyelitis who presents now to the emergency department at [facility] complaining of rapid heart rate    jacy  copd  AF  OP  OA  CAD  HTN  DM  HLD  OM hx  CVA     Pureed diet  renal eval noted  on cipro - suppression ABX  s/p Nucala for Asthma - as per home rx regimen  s/p CVA tx - CT head repeat noted -   transferred out of ICU    follows with Dr Ryland ashley med  uses NIV - BIPAP night time for JACY - sleep hygiene reviewed  cardio eval - [facility] rx regimen  BP control  DM care  AF rate and rhythm control  TFT  outpatient record reviewed  proventil PRN - Singulair - copd  spoke with WIFE  wound care

## 2024-05-08 NOTE — PROGRESS NOTE ADULT - PROBLEM SELECTOR PLAN 3
Chronic, on home insulin (Lantus 10u)  - POCT above goal, likely 2/2 d5 IVF and d5 w/ cipro IV  - D/c'ed IVF and transitioned to ciprofloxacin PO  - c/w MDISS and Lantus 15 units sQ QHS   - Hypoglycemia protocol in place

## 2024-05-08 NOTE — PROGRESS NOTE ADULT - SUBJECTIVE AND OBJECTIVE BOX
[facility] -- Génesis Villavicencio Pannella, Patel, Carolina Heath Cohen  Office # [phone]    Follow Up:   CAD, AF, CVA    Subjective/Observations:  seen and examined,  Resting  in bed. alert, denies  complaints of chest pain, dyspnea, or palpitations reported. No signs of orthopnea or PND. neb treatment in progress, on O2 via NC. IVF @ 75      REVIEW OF SYSTEMS: All other review of systems is negative unless indicated above  PAST MEDICAL & SURGICAL HISTORY:  Diabetes Mellitus, Type II      CAD (Coronary Artery Disease)  s/p 3v CABG 2004; stents placed in [place] in 2019      Dyslipidemia      Osteomyelitis      COPD (chronic obstructive pulmonary disease)  on 2L at home and BiPAP at night; intubated 6/18      Hypertension      PVD (peripheral vascular disease)      History of PAT (paroxysmal atrial tachycardia)      Asthma with COPD      BPH (benign prostatic hyperplasia)      Acute osteomyelitis      CABG (Coronary Artery Bypass Graft)  2004      Compound fracture  left leg      S/P primary angioplasty with coronary stent      H/O drainage of abscess  Left femur 12/2021        MEDICATIONS  (STANDING):  albuterol/ipratropium for Nebulization 3 milliLiter(s) Nebulizer every 6 hours  aMIOdarone    Tablet 400  Oral   aMIOdarone    Tablet 200 milliGRAM(s) Oral daily  apixaban 2.5 milliGRAM(s) Oral every 12 hours  aspirin  chewable 81 milliGRAM(s) Enteral Tube daily  atorvastatin 80 milliGRAM(s) Oral at bedtime  buDESOnide    Inhalation Suspension 0.5 milliGRAM(s) Inhalation two times a day  ciprofloxacin     Tablet 500 milliGRAM(s) Oral every 12 hours  dextrose 10% Bolus 125 milliLiter(s) IV Bolus once  dextrose 5%. 1000 milliLiter(s) (50 mL/Hr) IV Continuous <Continuous>  dextrose 5%. 1000 milliLiter(s) (100 mL/Hr) IV Continuous <Continuous>  dextrose 50% Injectable 25 Gram(s) IV Push once  dextrose 50% Injectable 12.5 Gram(s) IV Push once  glucagon  Injectable 1 milliGRAM(s) IntraMuscular once  insulin glargine Injectable (LANTUS) 15 Unit(s) SubCutaneous at bedtime  insulin lispro (ADMELOG) corrective regimen sliding scale   SubCutaneous three times a day before meals  insulin lispro (ADMELOG) corrective regimen sliding scale   SubCutaneous at bedtime  metoprolol tartrate 25 milliGRAM(s) Oral two times a day  montelukast 10 milliGRAM(s) Oral daily    MEDICATIONS  (PRN):  acetaminophen   Oral Liquid .. 650 milliGRAM(s) Oral every 6 hours PRN Temp greater or equal to 38.5C (101.3F)  dextrose Oral Gel 15 Gram(s) Oral once PRN Blood Glucose LESS THAN 70 milliGRAM(s)/deciliter    Allergies    No Known Allergies    Intolerances      Vital Signs Last 24 Hrs  T(C): 36.4 (08 May 2024 12:13), Max: 36.4 (08 May 2024 04:00)  T(F): 97.5 (08 May 2024 12:13), Max: 97.5 (08 May 2024 04:00)  HR: 103 (08 May 2024 12:13) (94 - 105)  BP: 108/77 (08 May 2024 12:13) (108/77 - 131/78)  BP(mean): --  RR: 18 (08 May 2024 12:13) (17 - 20)  SpO2: 100% (08 May 2024 12:13) (92% - 100%)    Parameters below as of 08 May 2024 12:13  Patient On (Oxygen Delivery Method): nasal cannula  O2 Flow (L/min): 2    I&O's Summary    07 May 2024 07:01  -  08 May 2024 07:00  --------------------------------------------------------  IN: 0 mL / OUT: 1100 mL / NET: -1100 mL          TELE: SR 's     PHYSICAL EXAM:  Constitutional: NAD, awake and alert  HEENT: Moist Mucous Membranes, Anicteric  Pulmonary: Non-labored, breath sounds are clear bilaterally, Diminished at bases No wheezing, rales or rhonchi  Cardiovascular: Regular, S1 and S2, + murmurs, No rubs, gallops or clicks  Gastrointestinal:  soft, nontender, nondistended   Lymph: +1 peripheral edema. No lymphadenopathy.   Skin: No visible rashes or ulcers.  Psych:  Mood & affect appropriate  LABS: All Labs Reviewed:                        12.3   7.93  )-----------( 311      ( 08 May 2024 07:40 )             39.5                         12.1   7.58  )-----------( 295      ( 07 May 2024 07:34 )             38.6                         12.3   8.13  )-----------( 274      ( 06 May 2024 06:18 )             40.2     08 May 2024 07:40    140    |  106    |  17     ----------------------------<  218    3.8     |  30     |  1.50   07 May 2024 07:34    141    |  106    |  19     ----------------------------<  233    3.1     |  31     |  1.50   06 May 2024 06:18    147    |  113    |  25     ----------------------------<  188    4.2     |  25     |  1.60     Ca    9.4        08 May 2024 07:40  Ca    9.2        07 May 2024 07:34  Ca    9.8        06 May 2024 06:18  Phos  2.8       07 May 2024 07:34  Phos  2.9       06 May 2024 06:18  Mg     2.1       07 May 2024 07:34  Mg     2.1       06 May 2024 06:18            12 Lead ECG:   Ventricular Rate 95 BPM    Atrial Rate 95 BPM    P-R Interval 136 ms    QRS Duration 86 ms    Q-T Interval 352 ms    QTC Calculation(Bazett) 442 ms    P Axis 84 degrees    R Axis 88 degrees    T Axis 58 degrees    Diagnosis Line Normal sinus rhythm  Low voltage QRS  possible BREANNA  Borderline ECG    Confirmed by Cookie Ordaz ([id number]) on 5/2/2024 2:38:25 PM (05-02-24 @ 09:49)      TRANSTHORACIC ECHOCARDIOGRAM REPORT  ________________________________________________________________________________                                      _______       Pt. Name:       YUE COTTO Study Date:    4/26/2024  MRN:            [id number]            YOB: 1940  Accession #:    [id number]           Age:           83 years  Account#:       [id number]          Gender:        M  Visit ID#  Heart Rate:                         Height:        70.08 in (178.00 cm)  Rhythm:                            Weight:        220.46 lb (100.00 kg)  Blood Pressure: 158/69 mmHg         BSA/BMI:       2.18 m² / 31.56 kg/m²  ________________________________________________________________________________________  Referring Physician:   [id number] Marc Grossman  Interpreting Physician: Cookie Ordaz MD  Primary Sonographer:    Clayton Wilkinson    CPT:               ECHO TTE WO CON COMP W DOPP - 11944.m  Indication(s):     Unspecified atrial fibrillation - I48.91  Procedure:         Transthoracic echocardiogram with 2-D, M-mode and complete                     spectral and color flow Doppler.  Ordering Location: [facility]  Admission Status:  Inpatient  Study Information: Image quality for this study is technically difficult.    _______________________________________________________________________________________     CONCLUSIONS:      1. Technically difficult image quality.   2. The LV is not well visualized, however the LV function is probably normal based on limited views.   3. The right ventricle is not well visualized. probably normal systolic function.   4. There is moderate thickening of the aortic valve leaflets.   5. Structurally normal mitral valve with normal leaflet excursion.   6. Trace tricuspid regurgitation.   7. The inferior vena cava is dilated measuring 2.18 cm in diameter, (dilated >2.1cm) with normal inspiratory collapse (normal >50%) consistent with mildly elevated right atrial pressure (~8, range 5-10mmHg).   8. Estimated pulmonary artery systolic pressure is 26 mmHg, consistent with normal pulmonary artery pressure.    ________________________________________________________________________________________  FINDINGS:     Left Ventricle:  There is normal left ventricular diastolic function. Left ventricular endocardium is not well visualized.     Right Ventricle:  The right ventricle is not well visualized. Probably normal systolic function.     Left Atrium:  The left atrium is normal in size with an indexed volume of 16.30 ml/m².     Right Atrium:  The right atrium is normal in size.     Aortic Valve:  The aortic valve anatomy cannot be determined with normal systolic excursion. There is mild calcification of the aortic valve leaflets. There is moderate thickening of the aortic valve leaflets.     Mitral Valve:  Structurally normal mitral valve with normal leaflet excursion. There is trace mitral regurgitation.     Tricuspid Valve:  The tricuspid valve was not well visualized. There is trace tricuspid regurgitation. Estimated pulmonary artery systolic pressure is 26 mmHg, consistent with normal pulmonary artery pressure.     Pulmonic Valve:  The pulmonic valve was not well visualized.     Aorta:  The aortic root at the sinuses of Valsalva is normal in size, measuring 3.30 cm (indexed 1.52 cm/m²).     Systemic Veins:  The inferior vena cava is dilated measuring 2.18 cm in diameter, (dilated >2.1cm) with normal inspiratory collapse (normal >50%) consistent with mildly elevated right atrial pressure (~8, range 5-10mmHg).  ____________________________________________________________________  QUANTITATIVE DATA:  Left Ventricle Measurements: (Indexed to BSA)     IVSd (2D):   1.1 cm  LVPWd (2D):  0.9 cm  LVIDd (2D):  4.0 cm  LVIDs (2D):  2.8 cm  LV Mass:     129 g  59.1 g/m²     MVE Vmax:    0.59 m/s  MV A Vmax:    0.85 m/s  MV E/A:       0.69  e' lateral:   6.96 cm/s  e' medial:    5.98 cm/s  E/e' lateral: 8.43  E/e' medial:  9.82  E/e' Average: 9.07  MV DT:        195 msec    Aorta Measurements: (Normal range) (Indexed to BSA)     Sinuses of Valsalva: 3.30 cm (3.1 - 3.7 cm)       Left Atrium Measurements: (Indexed to BSA)  LA Diam 2D: 3.30 cm       LVOT / RVOT/ Qp/Qs Data: (Indexed to BSA)  LVOT Diameter: 1.80 cm  LVOT Area:     2.54 cm²    Mitral Valve Measurements:     MV E Vmax: 0.6 m/s  MV A Vmax: 0.8 m/s  MV E/A:    0.7       Tricuspid Valve Measurements:     TR Vmax:          2.1 m/s  TR Peak Gradient: 18.1 mmHg  RA Pressure:      8 mmHg  PASP:             26 mmHg    ________________________________________________________________________________________  Electronically signed on 4/26/2024 at 4:46:45 PM by Cookie Ordaz MD         *** Final ***

## 2024-05-08 NOTE — PROGRESS NOTE ADULT - PROBLEM SELECTOR PLAN 7
Chronic  - c/w ASA and statin Chronic  - IV ciprofloxacin transitioned to home PO ciprofloxacin BID  - Local wound care for chronic R big toe wound

## 2024-05-08 NOTE — PROGRESS NOTE ADULT - SUBJECTIVE AND OBJECTIVE BOX
Patient is a 83y old  Male who presents with a chief complaint of AFib w/ RVR (07 May 2024 08:54)  Patient seen in follow up for hypernatremia, MERRILL.        PAST MEDICAL HISTORY:  Diabetes Mellitus, Type II    CAD (Coronary Artery Disease)    Dyslipidemia    Asymptomatic Peripheral Vascular Disease    Osteomyelitis    COPD (chronic obstructive pulmonary disease)    Diabetes mellitus    Hypertension    PVD (peripheral vascular disease)    History of PAT (paroxysmal atrial tachycardia)    Asthma with COPD    BPH (benign prostatic hyperplasia)    Acute osteomyelitis      MEDICATIONS  (STANDING):  albuterol/ipratropium for Nebulization 3 milliLiter(s) Nebulizer every 6 hours  aMIOdarone    Tablet 400  Oral   aMIOdarone    Tablet 200 milliGRAM(s) Oral daily  apixaban 2.5 milliGRAM(s) Oral every 12 hours  aspirin  chewable 81 milliGRAM(s) Enteral Tube daily  atorvastatin 80 milliGRAM(s) Oral at bedtime  buDESOnide    Inhalation Suspension 0.5 milliGRAM(s) Inhalation two times a day  ciprofloxacin     Tablet 500 milliGRAM(s) Oral every 12 hours  dextrose 10% Bolus 125 milliLiter(s) IV Bolus once  dextrose 5%. 1000 milliLiter(s) (50 mL/Hr) IV Continuous <Continuous>  dextrose 5%. 1000 milliLiter(s) (100 mL/Hr) IV Continuous <Continuous>  dextrose 50% Injectable 25 Gram(s) IV Push once  dextrose 50% Injectable 12.5 Gram(s) IV Push once  glucagon  Injectable 1 milliGRAM(s) IntraMuscular once  insulin glargine Injectable (LANTUS) 15 Unit(s) SubCutaneous at bedtime  insulin lispro (ADMELOG) corrective regimen sliding scale   SubCutaneous at bedtime  insulin lispro (ADMELOG) corrective regimen sliding scale   SubCutaneous three times a day before meals  metoprolol tartrate 25 milliGRAM(s) Oral two times a day  montelukast 10 milliGRAM(s) Oral daily    MEDICATIONS  (PRN):  acetaminophen   Oral Liquid .. 650 milliGRAM(s) Oral every 6 hours PRN Temp greater or equal to 38.5C (101.3F)  dextrose Oral Gel 15 Gram(s) Oral once PRN Blood Glucose LESS THAN 70 milliGRAM(s)/deciliter    T(C): 36.4 (05-08-24 @ 12:13), Max: 36.8 (05-06-24 @ 19:43)  HR: 103 (05-08-24 @ 13:59) (92 - 105)  BP: 108/77 (05-08-24 @ 12:13) (108/77 - 169/79)  RR: 18 (05-08-24 @ 12:13)  SpO2: 96% (05-08-24 @ 13:59)  Wt(kg): --  I&O's Detail    07 May 2024 07:01  -  08 May 2024 07:00  --------------------------------------------------------  IN:  Total IN: 0 mL    OUT:    Incontinent per Condom Catheter (mL): 1100 mL  Total OUT: 1100 mL    Total NET: -1100 mL              PHYSICAL EXAM:  General: No distress  Respiratory: b/l air entry  Cardiovascular: S1 S2  Gastrointestinal: soft  Extremities:  + edema                         LABORATORY:                        12.3   7.93  )-----------( 311      ( 08 May 2024 07:40 )             39.5     05-08    140  |  106  |  17  ----------------------------<  218<H>  3.8   |  30  |  1.50<H>    Ca    9.4      08 May 2024 07:40  Phos  2.8     05-07  Mg     2.1     05-07      Sodium: 140 mmol/L (05-08 @ 07:40)  Sodium: 141 mmol/L (05-07 @ 07:34)    Potassium: 3.8 mmol/L (05-08 @ 07:40)  Potassium: 3.1 mmol/L (05-07 @ 07:34)    Hemoglobin: 12.3 g/dL (05-08 @ 07:40)  Hemoglobin: 12.1 g/dL (05-07 @ 07:34)  Hemoglobin: 12.3 g/dL (05-06 @ 06:18)    Creatinine, Serum 1.50 (05-08 @ 07:40)  Creatinine, Serum 1.50 (05-07 @ 07:34)  Creatinine, Serum 1.60 (05-06 @ 06:18)          Urinalysis Basic - ( 08 May 2024 07:40 )    Color: x / Appearance: x / SG: x / pH: x  Gluc: 218 mg/dL / Ketone: x  / Bili: x / Urobili: x   Blood: x / Protein: x / Nitrite: x   Leuk Esterase: x / RBC: x / WBC x   Sq Epi: x / Non Sq Epi: x / Bacteria: x

## 2024-05-08 NOTE — PROGRESS NOTE ADULT - SUBJECTIVE AND OBJECTIVE BOX
Patient is a 83y old  Male who presents with a chief complaint of AFib w/ RVR (08 May 2024 05:50)      INTERVAL HPI/OVERNIGHT EVENTS: Patient seen and examined at bedside. No overnight events occurred. Pt is very somnolent today, actively falling asleep during conversation. Per wife at bedside pts mental status is not as alert as yesterday. Pt also placed on NC, as per wife he is on 2L o2 at home for COPD.      MEDICATIONS  (STANDING):  albuterol/ipratropium for Nebulization 3 milliLiter(s) Nebulizer every 6 hours  aMIOdarone    Tablet 400  Oral   aMIOdarone    Tablet 200 milliGRAM(s) Oral daily  apixaban 2.5 milliGRAM(s) Oral every 12 hours  aspirin  chewable 81 milliGRAM(s) Enteral Tube daily  atorvastatin 80 milliGRAM(s) Oral at bedtime  buDESOnide    Inhalation Suspension 0.5 milliGRAM(s) Inhalation two times a day  ciprofloxacin     Tablet 500 milliGRAM(s) Oral every 12 hours  dextrose 10% Bolus 125 milliLiter(s) IV Bolus once  dextrose 5%. 1000 milliLiter(s) (50 mL/Hr) IV Continuous <Continuous>  dextrose 5%. 1000 milliLiter(s) (100 mL/Hr) IV Continuous <Continuous>  dextrose 50% Injectable 25 Gram(s) IV Push once  dextrose 50% Injectable 12.5 Gram(s) IV Push once  glucagon  Injectable 1 milliGRAM(s) IntraMuscular once  insulin glargine Injectable (LANTUS) 15 Unit(s) SubCutaneous at bedtime  insulin lispro (ADMELOG) corrective regimen sliding scale   SubCutaneous at bedtime  insulin lispro (ADMELOG) corrective regimen sliding scale   SubCutaneous three times a day before meals  metoprolol tartrate 25 milliGRAM(s) Oral two times a day  montelukast 10 milliGRAM(s) Oral daily    MEDICATIONS  (PRN):  acetaminophen   Oral Liquid .. 650 milliGRAM(s) Oral every 6 hours PRN Temp greater or equal to 38.5C (101.3F)  dextrose Oral Gel 15 Gram(s) Oral once PRN Blood Glucose LESS THAN 70 milliGRAM(s)/deciliter      Allergies    No Known Allergies    Intolerances        REVIEW OF SYSTEMS:  CONSTITUTIONAL: +fatigue  HEENT:  No headache  RESPIRATORY: No shortness of breath  CARDIOVASCULAR: No chest pain  GASTROINTESTINAL: No abd pain  NEUROLOGICAL: +weakness    Vital Signs Last 24 Hrs  T(C): 36.4 (08 May 2024 04:00), Max: 36.8 (07 May 2024 11:30)  T(F): 97.5 (08 May 2024 04:00), Max: 98.3 (07 May 2024 11:30)  HR: 98 (08 May 2024 07:57) (94 - 105)  BP: 129/69 (08 May 2024 04:00) (129/69 - 138/79)  BP(mean): --  RR: 20 (08 May 2024 04:00) (17 - 20)  SpO2: 95% (08 May 2024 07:57) (91% - 95%)    Parameters below as of 08 May 2024 07:57  Patient On (Oxygen Delivery Method): nasal cannula, 3 LPM        PHYSICAL EXAM:  GENERAL: Sleeping on arrival. NAD  HEENT:  Atraumatic, + L facial droop  CHEST/LUNG: +diffuse expiratory wheezes bilaterally  HEART:  RRR, S1, S2  ABDOMEN:  BS+, soft, NT/ND  EXTREMITIES: no edema  NERVOUS SYSTEM: Somnolent. Moans and nods to questions. Actively falling asleep during conversation.     LABS:                        12.3   7.93  )-----------( 311      ( 08 May 2024 07:40 )             39.5     CBC Full  -  ( 08 May 2024 07:40 )  WBC Count : 7.93 K/uL  Hemoglobin : 12.3 g/dL  Hematocrit : 39.5 %  Platelet Count - Automated : 311 K/uL  Mean Cell Volume : 86.4 fl  Mean Cell Hemoglobin : 26.9 pg  Mean Cell Hemoglobin Concentration : 31.1 gm/dL  Auto Neutrophil # : 5.83 K/uL  Auto Lymphocyte # : 0.99 K/uL  Auto Monocyte # : 0.96 K/uL  Auto Eosinophil # : 0.06 K/uL  Auto Basophil # : 0.03 K/uL  Auto Neutrophil % : 73.4 %  Auto Lymphocyte % : 12.5 %  Auto Monocyte % : 12.1 %  Auto Eosinophil % : 0.8 %  Auto Basophil % : 0.4 %    08 May 2024 07:40    140    |  106    |  17     ----------------------------<  218    3.8     |  30     |  1.50     Ca    9.4        08 May 2024 07:40        Urinalysis Basic - ( 08 May 2024 07:40 )    Color: x / Appearance: x / SG: x / pH: x  Gluc: 218 mg/dL / Ketone: x  / Bili: x / Urobili: x   Blood: x / Protein: x / Nitrite: x   Leuk Esterase: x / RBC: x / WBC x   Sq Epi: x / Non Sq Epi: x / Bacteria: x      CAPILLARY BLOOD GLUCOSE      POCT Blood Glucose.: 196 mg/dL (08 May 2024 08:36)  POCT Blood Glucose.: 193 mg/dL (07 May 2024 21:22)  POCT Blood Glucose.: 177 mg/dL (07 May 2024 17:16)  POCT Blood Glucose.: 222 mg/dL (07 May 2024 11:58)      Personally reviewed.     Consultant(s) Notes Reviewed:  [x] YES  [ ] NO

## 2024-05-08 NOTE — PROGRESS NOTE ADULT - ASSESSMENT
83-year-old M with history of COPD on home O2 PRN, coronary artery disease s/p CABG,  hypertension, diabetes, hyperlipidemia, atrial fibrillation on Eliquis as well as chronic osteomyelitis who presents to the emergency department at [facility] complaining of rapid heart rate. Cardiology consulted for afib with rvr.    CAD, CABG, HTN, HLD, A fib  - p/w PAF with RVR, with SOB at home in ED s/p Cardizem gtt, admits to had missed home BB   - on the morning of 4/27, patient was noted to have new onset aphasia and a code stroke was called.  He was deemed not a candidate for TNK as he is on AC.  He was found to have a L M3 occlusion but was deemed not a candidate for thrombectomy as occlusion too distal.    - Later that night, a RRT was called due to worsening NIH score noted by nursing.  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   - Continue ASA and statin     - Tele w/ SR/ST  90-100s, no events overnight    - Continue PO Amiodarone loading.   - Continue Eliquis 2.5  mg BID. Dose reduced in the setting of renal function and age. Previous Cr of 1.4, if his renal function goes back to baseline of 1.4 would place on full dose Eliquis for CVA protection  - BP labile 100-160's, had episode of hypotension in ICU needing pressors, pressors stopped as MAP now improved on midodrine.  - continue Lopressor 25mg q12 with hold parameters    - Monitor and replete Lytes. Keep K > 4 and Mg > 2    - DC planning per primary   - Will continue to follow.    Nikki Chan [facility]  Nurse Practitioner - Cardiology   call TEAMS

## 2024-05-08 NOTE — SOCIAL WORK PROGRESS NOTE - NSSWPROGRESSNOTE_GEN_ALL_CORE
Discussed at daily rounds. [facility] still denying. Waiting for MD at [facility] to review updates. [facility] denied for sub-acute ([facility]). I met with wife at bedside and informed her. Explained that acute rehab seem to be concerned that he will not be able to withstand 3 hours of therapies. Provided S[facility] list and educated re. CMS site for ratings of facilities. Support provided. She will review [facility] list. Social work to follow.  Discussed at daily rounds. [facility] still denying. Waiting for MD at [facility] to review updates. [facility] denied for sub-acute ([facility]I). I met with wife at bedside and informed her. Explained that acute rehabs seem to be concerned that he will not be able to withstand 3 hours of therapies. Provided S[facility] list and educated re. CMS site for ratings of facilities. Support provided. She will review [facility] list. [facility] later reached out and inquired if wife would be willing to hire aide after acute rehab. Met with wife and she is now stating that she does not think he is currently up to doing acute rehab. She inquired about going to acute rehab in future. I confirmed with [facility] patient cannot go from [facility] to acute rehab. Informed spouse and told her if condition improves we can re-send to acute rehabs but for now will plan for [facility]. Social work to follow.

## 2024-05-08 NOTE — PROGRESS NOTE ADULT - SUBJECTIVE AND OBJECTIVE BOX
Date/Time Patient Seen:  		  Referring MD:   Data Reviewed	       Patient is a 83y old  Male who presents with a chief complaint of AFib w/ RVR (07 May 2024 17:18)      Subjective/HPI     PAST MEDICAL & SURGICAL HISTORY:  Diabetes Mellitus, Type II    CAD (Coronary Artery Disease)  s/p 3v CABG 2004; stents placed in [place] in 2019    Dyslipidemia    Asymptomatic Peripheral Vascular Disease    Osteomyelitis    COPD (chronic obstructive pulmonary disease)  on 2L at home and BiPAP at night; intubated 6/18    Diabetes mellitus    Hypertension    PVD (peripheral vascular disease)    History of PAT (paroxysmal atrial tachycardia)    Asthma with COPD    BPH (benign prostatic hyperplasia)    Acute osteomyelitis    CABG (Coronary Artery Bypass Graft)  2004    Compound fracture  left leg    S/P primary angioplasty with coronary stent    H/O drainage of abscess  Left femur 12/2021          Medication list         MEDICATIONS  (STANDING):  albuterol/ipratropium for Nebulization 3 milliLiter(s) Nebulizer every 6 hours  aMIOdarone    Tablet 400  Oral   aMIOdarone    Tablet 200 milliGRAM(s) Oral daily  apixaban 2.5 milliGRAM(s) Oral every 12 hours  aspirin  chewable 81 milliGRAM(s) Enteral Tube daily  atorvastatin 80 milliGRAM(s) Oral at bedtime  buDESOnide    Inhalation Suspension 0.5 milliGRAM(s) Inhalation two times a day  ciprofloxacin   IVPB 400 milliGRAM(s) IV Intermittent every 12 hours  dextrose 10% Bolus 125 milliLiter(s) IV Bolus once  dextrose 5%. 1000 milliLiter(s) (50 mL/Hr) IV Continuous <Continuous>  dextrose 5%. 1000 milliLiter(s) (50 mL/Hr) IV Continuous <Continuous>  dextrose 5%. 1000 milliLiter(s) (100 mL/Hr) IV Continuous <Continuous>  dextrose 50% Injectable 25 Gram(s) IV Push once  dextrose 50% Injectable 12.5 Gram(s) IV Push once  glucagon  Injectable 1 milliGRAM(s) IntraMuscular once  insulin glargine Injectable (LANTUS) 15 Unit(s) SubCutaneous at bedtime  insulin lispro (ADMELOG) corrective regimen sliding scale   SubCutaneous three times a day before meals  insulin lispro (ADMELOG) corrective regimen sliding scale   SubCutaneous at bedtime  montelukast 10 milliGRAM(s) Oral daily    MEDICATIONS  (PRN):  acetaminophen   Oral Liquid .. 650 milliGRAM(s) Oral every 6 hours PRN Temp greater or equal to 38.5C (101.3F)  dextrose Oral Gel 15 Gram(s) Oral once PRN Blood Glucose LESS THAN 70 milliGRAM(s)/deciliter         Vitals log        ICU Vital Signs Last 24 Hrs  T(C): 36.4 (08 May 2024 04:00), Max: 36.8 (07 May 2024 11:30)  T(F): 97.5 (08 May 2024 04:00), Max: 98.3 (07 May 2024 11:30)  HR: 94 (08 May 2024 04:00) (94 - 105)  BP: 129/69 (08 May 2024 04:00) (129/69 - 138/79)  BP(mean): --  ABP: --  ABP(mean): --  RR: 20 (08 May 2024 04:00) (17 - 20)  SpO2: 95% (08 May 2024 04:00) (91% - 95%)    O2 Parameters below as of 08 May 2024 04:00  Patient On (Oxygen Delivery Method): nasal cannula                 Input and Output:  I&O's Detail    06 May 2024 07:01  -  07 May 2024 07:00  --------------------------------------------------------  IN:    dextrose 5%: 180 mL    dextrose 5%: 525 mL  Total IN: 705 mL    OUT:    Incontinent per Condom Catheter (mL): 1400 mL  Total OUT: 1400 mL    Total NET: -695 mL      07 May 2024 07:01  -  08 May 2024 05:51  --------------------------------------------------------  IN:  Total IN: 0 mL    OUT:    Incontinent per Condom Catheter (mL): 600 mL  Total OUT: 600 mL    Total NET: -600 mL          Lab Data                        12.1   7.58  )-----------( 295      ( 07 May 2024 07:34 )             38.6     05-07    141  |  106  |  19  ----------------------------<  233<H>  3.1<L>   |  31  |  1.50<H>    Ca    9.2      07 May 2024 07:34  Phos  2.8     05-07  Mg     2.1     05-07              Review of Systems	      Objective     Physical Examination    heart s1s2  lung dc BS  head nc      Pertinent Lab findings & Imaging      Eliecer:  NO   Adequate UO     I&O's Detail    06 May 2024 07:01  -  07 May 2024 07:00  --------------------------------------------------------  IN:    dextrose 5%: 180 mL    dextrose 5%: 525 mL  Total IN: 705 mL    OUT:    Incontinent per Condom Catheter (mL): 1400 mL  Total OUT: 1400 mL    Total NET: -695 mL      07 May 2024 07:01  -  08 May 2024 05:51  --------------------------------------------------------  IN:  Total IN: 0 mL    OUT:    Incontinent per Condom Catheter (mL): 600 mL  Total OUT: 600 mL    Total NET: -600 mL               Discussed with:     Cultures:	        Radiology

## 2024-05-08 NOTE — PROGRESS NOTE ADULT - SUBJECTIVE AND OBJECTIVE BOX
Neurology Follow up note    YUE [id number]    HPI:  83yoM PMHx AFib on Eliquis, CAD, HTN, DM, HLD, COPD, osteomyelitis presents c/o shortness of breath, chest discomfort. Pt reports onset of rapid heart rate this morning, HR measured 160s when he checked his pulse ox. Also endorses dyspnea exacerbated by movement. Pt reports last episode of AFib in 2020, no episodes since then until today's presentation. Pt states that chest discomfort feels like chest pressure, no chest pain. Pt also reports chronic R big toe wound for the past few months, follows w/ Wound Care. States that he has increased sensitivity to R sole of foot, but denies pain, numbness/tingling. Denies fevers, chills, abdominal pain, N/V/D/C.     IN THE ED:  Temp 98  F ,  , /81  ,RR 18 , SpO2 94%  S/P PO , IV diltiazem 10mg x2, IV diltiazem gtt @ 10 mg/hr  Labs significant for BUN/Cr 25/1.6, troponin 507.9, pBNP 355  Imaging:   CXR wet read: no gross abnormalities (25 Apr 2024 12:13)      Interval History no new weakness    Patient is seen, chart was reviewed and case was discussed with the treatment team.  Pt is not in any distress.   Lying on bed comfortably.     .  Vital Signs Last 24 Hrs  T(C): 36.4 (08 May 2024 12:13), Max: 36.4 (08 May 2024 04:00)  T(F): 97.5 (08 May 2024 12:13), Max: 97.5 (08 May 2024 04:00)  HR: 103 (08 May 2024 13:59) (94 - 103)  BP: 108/77 (08 May 2024 12:13) (108/77 - 131/78)  BP(mean): --  RR: 18 (08 May 2024 12:13) (17 - 20)  SpO2: 96% (08 May 2024 13:59) (92% - 100%)    Parameters below as of 08 May 2024 13:59  Patient On (Oxygen Delivery Method): nasal cannula, 2 LPM                                        REVIEW OF SYSTEMS:    Constitutional: No fever, weight loss or fatigue  Eyes: No eye pain, visual disturbances, or discharge  ENT:  No difficulty hearing, tinnitus, vertigo; No sinus or throat pain  Neck: No pain or stiffness  Respiratory: No wheezing, chills or hemoptysis  Cardiovascular: No chest pain, palpitations, shortness of breath, dizziness  Gastrointestinal: No abdominal or epigastric pain. No nausea, vomiting or hematemesis  Genitourinary: No dysuria, frequency, hematuria or incontinence  Neurological: No headaches, numbness or tremors  Psychiatric: No depression, anxiety, mood swings or difficulty sleeping  Musculoskeletal: No joint pain or swelling; No muscle, back or extremity pain  Skin: No itching, burning, rashes or lesions   Lymph Nodes: No enlarged glands  Endocrine: No heat or cold intolerance; No hair loss,   Allergy and Immunologic: No hives or eczema    On Neurological Examination:    Mental Status - Pt is alert, awake, oriented X3.. Follows commands well and able to answer questions appropriately.Mood and affect  normal    Speech -  MIXED APHASIA    Cranial Nerves - Pupils 3 mm equal and reactive to light, extraocular eye movements intact. Pt has no visual field deficit.  Pt has right facial weakness  . Facial sensation is intact.Tongue - is in midline.    Muscle tone - is decreased on right  .      Motor Exam -right hemiparesis; RUE 1/5; LE 1-2/5   No shaking or tremors.    Sensory Exam -. Pt withdraws all extremities equally on stimulation. No asymmetry seen. No complaints of tingling, numbness.        coordination:    Finger to nose: normal-left        Deep tendon Reflexes - 2 plus all over.            Neck Supple -  Yes.     MEDICATIONS  (STANDING):  albuterol/ipratropium for Nebulization 3 milliLiter(s) Nebulizer every 6 hours  aMIOdarone    Tablet 400 milliGRAM(s) Oral every 8 hours  aMIOdarone    Tablet 400  Oral   apixaban 2.5 milliGRAM(s) Oral every 12 hours  aspirin  chewable 81 milliGRAM(s) Enteral Tube daily  atorvastatin 80 milliGRAM(s) Oral at bedtime  buDESOnide    Inhalation Suspension 0.5 milliGRAM(s) Inhalation two times a day  ciprofloxacin   IVPB 400 milliGRAM(s) IV Intermittent every 12 hours  insulin lispro (ADMELOG) corrective regimen sliding scale   SubCutaneous every 6 hours  midodrine 10 milliGRAM(s) Oral every 8 hours    MEDICATIONS  (PRN):  acetaminophen   Oral Liquid .. 650 milliGRAM(s) Oral every 6 hours PRN Temp greater or equal to 38.5C (101.3F)          Allergies    No Known Allergies    Intolerances    05-08    140  |  106  |  17  ----------------------------<  218<H>  3.8   |  30  |  1.50<H>    Ca    9.4      08 May 2024 07:40  Phos  2.8     05-07  Mg     2.1     05-07            Hemoglobin A1C:     Vitamin B12     RADIOLOGY    ASSESSMENT AND PLAN:      Seen for right sided weakness/ams  consistent subacute left mca infarct  also multiple punctate subacute cerebellar infarcts  most likely cardio-embolic    no further neuro w/u  ON AC and asa  continue statin  patient would benefit from acute rehab  no need for permissive HTN   Physical therapy evaluation.  Pain is accessed and addressed.  Plan of care was discussed with family(wife_. Questions answered.  Would continue to follow.

## 2024-05-08 NOTE — PROGRESS NOTE ADULT - ATTENDING COMMENTS
Patient was seen and examined by myself. Case was discussed with house staff in details. I have reviewed and agree with the plan as outlined above with edits where appropriate.    HPI:  83yoM PMHx AFib on Eliquis, CAD, HTN, DM, HLD, COPD, osteomyelitis presents c/o shortness of breath, chest discomfort. Pt reports onset of rapid heart rate this morning, HR measured 160s when he checked his pulse ox. Also endorses dyspnea exacerbated by movement. Pt reports last episode of AFib in 2020, no episodes since then until today's presentation. Pt states that chest discomfort feels like chest pressure, no chest pain. Pt also reports chronic R big toe wound for the past few months, follows w/ Wound Care. States that he has increased sensitivity to R sole of foot, but denies pain, numbness/tingling. Denies fevers, chills, abdominal pain, N/V/D/C.     IN THE ED:  Temp 98  F ,  , /81  ,RR 18 , SpO2 94%  S/P PO , IV diltiazem 10mg x2, IV diltiazem gtt @ 10 mg/hr  Labs significant for BUN/Cr 25/1.6, troponin 507.9, pBNP 355  Imaging:   CXR wet read: no gross abnormalities (25 Apr 2024 12:13)      ROS: as in the HPI; all other ROS negative    SH and family history as above    Vital Signs Last 24 Hrs  T(C): 36.4 (08 May 2024 12:13), Max: 36.4 (08 May 2024 04:00)  T(F): 97.5 (08 May 2024 12:13), Max: 97.5 (08 May 2024 04:00)  HR: 103 (08 May 2024 13:59) (94 - 103)  BP: 108/77 (08 May 2024 12:13) (108/77 - 131/78)  BP(mean): --  RR: 18 (08 May 2024 12:13) (17 - 20)  SpO2: 96% (08 May 2024 13:59) (92% - 100%)    Parameters below as of 08 May 2024 13:59  Patient On (Oxygen Delivery Method): nasal cannula, 2 LPM        GEN: NAD, appears weak   HEENT- normocephalic; mouth moist, right facial drop   CVS- S1S2+, irregular   LUNGS- clear to auscultation; no wheezing  ABD: Soft , nontender, nondistended, Bowel sounds are present  EXTREMITY: trace Ankle swelling B/L   NEURO: AA; slurred speech, dysarthria, Right side weakness gross motor 1/5, Left  2/5   PSYCH: follows commend   BACK: no swelling or mass;   VASCULAR: distal peripheral pulses present  SKIN: warm and dry.       Labs and imaging reviewed      Patient presenting with Patient is a 83y old  Male who presents with a chief complaint of AFib w/ RVR (08 May 2024 16:50)   admitted for    A fib with RVR: now on eliquis and Amiodarone/lopressor, cardio eval noted   CVA: ASA, statin, PT/OT, will benefit from acute rehab. BP/Afib management   dysphagia: purred, aspiration precaution   Hypernatremia: d/c IVF, encourage po free water intake   DM : lantus with RISS , FS goal 100-180          Plan of care discussed with patient and wife at bedside  ;  all questions and concerns were addressed.

## 2024-05-09 NOTE — PROGRESS NOTE ADULT - SUBJECTIVE AND OBJECTIVE BOX
[facility] -- Génesis Villavicencio Pannella, Patel, Carolina Heath: Office # [phone]    Follow Up:  CAD, AF, CVA    Subjective/Observations: Patient seen and examined. Patient alert awake, Denies chest pain palpitation dizziness, denies difficulty breathing , no orthopnea or PND noted. sat 92-98%  on 2l NC      REVIEW OF SYSTEMS: All other review of systems are negative unless indicated above    PAST MEDICAL & SURGICAL HISTORY:  Diabetes Mellitus, Type II      CAD (Coronary Artery Disease)  s/p 3v CABG 2004; stents placed in [place] in 2019      Dyslipidemia      Osteomyelitis      COPD (chronic obstructive pulmonary disease)  on 2L at home and BiPAP at night; intubated 6/18      Hypertension      PVD (peripheral vascular disease)      History of PAT (paroxysmal atrial tachycardia)      Asthma with COPD      BPH (benign prostatic hyperplasia)      Acute osteomyelitis      CABG (Coronary Artery Bypass Graft)  2004      Compound fracture  left leg      S/P primary angioplasty with coronary stent      H/O drainage of abscess  Left femur 12/2021          MEDICATIONS  (STANDING):  albuterol/ipratropium for Nebulization 3 milliLiter(s) Nebulizer every 6 hours  aMIOdarone    Tablet 400  Oral   aMIOdarone    Tablet 200 milliGRAM(s) Oral daily  apixaban 2.5 milliGRAM(s) Oral every 12 hours  aspirin  chewable 81 milliGRAM(s) Enteral Tube daily  atorvastatin 80 milliGRAM(s) Oral at bedtime  buDESOnide    Inhalation Suspension 0.5 milliGRAM(s) Inhalation two times a day  ciprofloxacin     Tablet 500 milliGRAM(s) Oral every 12 hours  dextrose 10% Bolus 125 milliLiter(s) IV Bolus once  dextrose 5%. 1000 milliLiter(s) (100 mL/Hr) IV Continuous <Continuous>  dextrose 5%. 1000 milliLiter(s) (50 mL/Hr) IV Continuous <Continuous>  dextrose 50% Injectable 25 Gram(s) IV Push once  dextrose 50% Injectable 12.5 Gram(s) IV Push once  glucagon  Injectable 1 milliGRAM(s) IntraMuscular once  insulin glargine Injectable (LANTUS) 15 Unit(s) SubCutaneous at bedtime  insulin lispro (ADMELOG) corrective regimen sliding scale   SubCutaneous at bedtime  insulin lispro (ADMELOG) corrective regimen sliding scale   SubCutaneous three times a day before meals  metoprolol tartrate 25 milliGRAM(s) Oral two times a day  montelukast 10 milliGRAM(s) Oral daily  nystatin    Suspension [id number] Unit(s) Oral three times a day    MEDICATIONS  (PRN):  acetaminophen   Oral Liquid .. 650 milliGRAM(s) Oral every 6 hours PRN Temp greater or equal to 38.5C (101.3F)  dextrose Oral Gel 15 Gram(s) Oral once PRN Blood Glucose LESS THAN 70 milliGRAM(s)/deciliter    Allergies    No Known Allergies    Intolerances      Vital Signs Last 24 Hrs  T(C): 36.4 (09 May 2024 05:40), Max: 36.6 (08 May 2024 19:50)  T(F): 97.5 (09 May 2024 05:40), Max: 97.9 (08 May 2024 19:50)  HR: 98 (09 May 2024 08:02) (82 - 103)  BP: 122/73 (09 May 2024 05:40) (108/77 - 132/78)  BP(mean): --  RR: 18 (09 May 2024 05:40) (18 - 19)  SpO2: 98% (09 May 2024 08:02) (88% - 100%)    Parameters below as of 09 May 2024 08:02  Patient On (Oxygen Delivery Method): nasal cannula, 2 LPM      I&O's Summary    08 May 2024 07:01  -  09 May 2024 07:00  --------------------------------------------------------  IN: 0 mL / OUT: 700 mL / NET: -700 mL          TELE: NSR/ST   PHYSICAL EXAM:  Constitutional: NAD, awake and alert,   HEENT: Moist Mucous Membranes, Anicteric  Pulmonary: Non-labored, breath sounds are clear bilaterally, No wheezing, rales or rhonchi  Cardiovascular: Regular, S1 and S2, No murmurs, No rubs, gallops or clicks  Gastrointestinal:  soft, nontender, nondistended   Lymph: No peripheral edema. No lymphadenopathy.   Skin: No visible rashes or ulcers.  Psych:  Mood & affect appropriate      LABS: All Labs Reviewed:                        12.2   7.90  )-----------( 302      ( 09 May 2024 09:12 )             39.3                         12.3   7.93  )-----------( 311      ( 08 May 2024 07:40 )             39.5                         12.1   7.58  )-----------( 295      ( 07 May 2024 07:34 )             38.6     09 May 2024 09:12    142    |  108    |  19     ----------------------------<  134    3.8     |  30     |  1.50   08 May 2024 07:40    140    |  106    |  17     ----------------------------<  218    3.8     |  30     |  1.50   07 May 2024 07:34    141    |  106    |  19     ----------------------------<  233    3.1     |  31     |  1.50     Ca    9.9        09 May 2024 09:12  Ca    9.4        08 May 2024 07:40  Ca    9.2        07 May 2024 07:34  Phos  3.4       09 May 2024 09:12  Phos  2.8       07 May 2024 07:34  Mg     2.0       09 May 2024 09:12  Mg     2.1       07 May 2024 07:34    TPro  6.2    /  Alb  2.6    /  TBili  0.5    /  DBili  x      /  AST  28     /  ALT  23     /  AlkPhos  97     09 May 2024 09:12   LIVER FUNCTIONS - ( 09 May 2024 09:12 )  Alb: 2.6 g/dL / Pro: 6.2 g/dL / ALK PHOS: 97 U/L / ALT: 23 U/L / AST: 28 U/L / GGT: x           Triiodothyronine, Total (T3 Total): 112 ng/dL (04-25-24 @ 15:45)  Cholesterol: 124 mg/dL (04-26-24 @ 08:05)  HDL Cholesterol: 48 mg/dL (04-26-24 @ 08:05)  Triglycerides, Serum: 93 mg/dL (04-26-24 @ 08:05)    12 Lead ECG:   Ventricular Rate 95 BPM    Atrial Rate 95 BPM    P-R Interval 136 ms    QRS Duration 86 ms    Q-T Interval 352 ms    QTC Calculation(Bazett) 442 ms    P Axis 84 degrees    R Axis 88 degrees    T Axis 58 degrees    Diagnosis Line Normal sinus rhythm  Low voltage QRS  possible BREANNA  Borderline ECG    Confirmed by Cookie Ordaz ([id number]) on 5/2/2024 2:38:25 PM (05-02-24 @ 09:49)      TRANSTHORACIC ECHOCARDIOGRAM REPORT  ________________________________________________________________________________                                      _______       Pt. Name:       YUE COTTO Study Date:    4/26/2024  MRN:            [id number]            YOB: 1940  Accession #:    [id number]           Age:           83 years  Account#:       [id number]          Gender:        M  Visit ID#  Heart Rate:                         Height:        70.08 in (178.00 cm)  Rhythm:                            Weight:        220.46 lb (100.00 kg)  Blood Pressure: 158/69 mmHg         BSA/BMI:       2.18 m² / 31.56 kg/m²  ________________________________________________________________________________________  Referring Physician:   [id number] Marc Grossman  Interpreting Physician: Cookie Ordaz MD  Primary Sonographer:    Clayton Wilkinson    CPT:               ECHO TTE WO CON COMP W DOPP - 21309.m  Indication(s):     Unspecified atrial fibrillation - I48.91  Procedure:         Transthoracic echocardiogram with 2-D, M-mode and complete                     spectral and color flow Doppler.  Ordering Location: [facility]  Admission Status:  Inpatient  Study Information: Image quality for this study is technically difficult.    _______________________________________________________________________________________     CONCLUSIONS:      1. Technically difficult image quality.   2. The LV is not well visualized, however the LV function is probably normal based on limited views.   3. The right ventricle is not well visualized. probably normal systolic function.   4. There is moderate thickening of the aortic valve leaflets.   5. Structurally normal mitral valve with normal leaflet excursion.   6. Trace tricuspid regurgitation.   7. The inferior vena cava is dilated measuring 2.18 cm in diameter, (dilated >2.1cm) with normal inspiratory collapse (normal >50%) consistent with mildly elevated right atrial pressure (~8, range 5-10mmHg).   8. Estimated pulmonary artery systolic pressure is 26 mmHg, consistent with normal pulmonary artery pressure.    ________________________________________________________________________________________  FINDINGS:     Left Ventricle:  There is normal left ventricular diastolic function. Left ventricular endocardium is not well visualized.     Right Ventricle:  The right ventricle is not well visualized. Probably normal systolic function.     Left Atrium:  The left atrium is normal in size with an indexed volume of 16.30 ml/m².     Right Atrium:  The right atrium is normal in size.     Aortic Valve:  The aortic valve anatomy cannot be determined with normal systolic excursion. There is mild calcification of the aortic valve leaflets. There is moderate thickening of the aortic valve leaflets.     Mitral Valve:  Structurally normal mitral valve with normal leaflet excursion. There is trace mitral regurgitation.     Tricuspid Valve:  The tricuspid valve was not well visualized. There is trace tricuspid regurgitation. Estimated pulmonary artery systolic pressure is 26 mmHg, consistent with normal pulmonary artery pressure.     Pulmonic Valve:  The pulmonic valve was not well visualized.     Aorta:  The aortic root at the sinuses of Valsalva is normal in size, measuring 3.30 cm (indexed 1.52 cm/m²).     Systemic Veins:  The inferior vena cava is dilated measuring 2.18 cm in diameter, (dilated >2.1cm) with normal inspiratory collapse (normal >50%) consistent with mildly elevated right atrial pressure (~8, range 5-10mmHg).  ____________________________________________________________________  QUANTITATIVE DATA:  Left Ventricle Measurements: (Indexed to BSA)     IVSd (2D):   1.1 cm  LVPWd (2D):  0.9 cm  LVIDd (2D):  4.0 cm  LVIDs (2D):  2.8 cm  LV Mass:     129 g  59.1 g/m²     MVE Vmax:    0.59 m/s  MV A Vmax:    0.85 m/s  MV E/A:       0.69  e' lateral:   6.96 cm/s  e' medial:    5.98 cm/s  E/e' lateral: 8.43  E/e' medial:  9.82  E/e' Average: 9.07  MV DT:        195 msec    Aorta Measurements: (Normal range) (Indexed to BSA)     Sinuses of Valsalva: 3.30 cm (3.1 - 3.7 cm)       Left Atrium Measurements: (Indexed to BSA)  LA Diam 2D: 3.30 cm       LVOT / RVOT/ Qp/Qs Data: (Indexed to BSA)  LVOT Diameter: 1.80 cm  LVOT Area:     2.54 cm²    Mitral Valve Measurements:     MV E Vmax: 0.6 m/s  MV A Vmax: 0.8 m/s  MV E/A:    0.7       Tricuspid Valve Measurements:     TR Vmax:          2.1 m/s  TR Peak Gradient: 18.1 mmHg  RA Pressure:      8 mmHg  PASP:             26 mmHg    ________________________________________________________________________________________  Electronically signed on 4/26/2024 at 4:46:45 PM by Cookie Ordaz MD         *** Final ***

## 2024-05-09 NOTE — PROGRESS NOTE ADULT - PROBLEM SELECTOR PLAN 7
Oral thrush noted on today's exam  - Can be 2/2 inhaled ICS  - Start nyastatin swish and swallow TID

## 2024-05-09 NOTE — PROGRESS NOTE ADULT - PROBLEM SELECTOR PLAN 3
Chronic, on home insulin (Lantus 10u)  - POCT improved w/ discontinuation of d5  - c/w MDISS and Lantus 15 units sQ QHS   - Hypoglycemia protocol in place

## 2024-05-09 NOTE — CONSULT NOTE ADULT - ASSESSMENT
A/P 84y year old Male with CVA, R hemiplegia, ambulatory dysfunction, muscle weakness, dysphagia, aphasia, dysarthria    Patient would be a better candidate for SUSI rather than acute rehab given his significant functional decline and poor progression/lack of progression while inpatient as reviewed in therapy notes.  Discussed with patients wife at length, communicated these recs to Dr Sky in the evening as well.  We spoke about patients prognosis in detail as well.    Primary team notes are reviewed  Therapy notes are reviewed  Laboratory studies reviewed including those mentioned earlier/above  Discussed management/coordinated care with primary team/referring provider.      82 minutes spent during patient encounter:    ¦ preparing to see the patient   ¦ obtaining and/or reviewing separately obtained history from patients wife due to neurological deficits  ¦ performing a medically appropriate examination and/or evaluation   ¦ counseling and educating the patient/family/caregiver   ¦ referring and communicating with other health care professionals  ¦ documenting clinical information in the electronic or other health record   ¦ care coordination

## 2024-05-09 NOTE — PROGRESS NOTE ADULT - ASSESSMENT
83-year-old M with history of COPD on home O2 PRN, coronary artery disease s/p CABG,  hypertension, diabetes, hyperlipidemia, atrial fibrillation on Eliquis as well as chronic osteomyelitis who presents to the emergency department at [facility] complaining of rapid heart rate. Cardiology consulted for afib with rvr.    CAD, CABG, HTN, HLD, A fib  - p/w PAF with RVR, with SOB at home in ED s/p Cardizem gtt, admits to had missed home BB   - on the morning of 4/27, patient was noted to have new onset aphasia and a code stroke was called.  He was deemed not a candidate for TNK as he is on AC.  He was found to have a L M3 occlusion but was deemed not a candidate for thrombectomy as occlusion too distal.    - Later that night, a RRT was called due to worsening NIH score noted by nursing.  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   - neuro following   - Continue ASA and statin     - Tele w/ SR/ST  90-100s  - completed  Po amio load , continue on 200mg Po daily  - Continue Eliquis 2.5  mg BID. Dose reduced in the setting of renal function and age. Previous Cr of 1.4, if his renal function goes back to baseline of 1.4 would place on full dose Eliquis for CVA protection  -Had episode of hypotension in ICU needing pressors, pressors stopped as MAP now improved on midodrine-. now d/c  - BP stable,   - continue Lopressor 25mg q12 with hold parameters    - Monitor and replete Lytes. Keep K > 4 and Mg > 2    - DC planning per primary   - Will continue to follow.

## 2024-05-09 NOTE — GOALS OF CARE CONVERSATION - ADVANCED CARE PLANNING - CONVERSATION DETAILS
Writer met with pt wife at bedside. Reviewed patient's medical and social history as well as events leading to patient's hospitalization. Writer discussed patient's current diagnosis CVA ,afib, COPD, HTN ,MERRILL, HLD debility advanced age dysphagia . medical condition and management, prognosis, and life expectancy.   Follow up to conversation that took place yesterday regarding cardiopulmonary resuscitation. Pt wife agree to DNR/DNI with trial of NIV. MOLST placed on medical record.
Writer met with pt wife at bedside. Reviewed patient's medical and social history as well as events leading to patient's hospitalization. Writer discussed patient's current diagnosis CVA ,afib, COPD, HTN ,MERRILL, HLD debility advanced age dysphagia . medical condition and management, prognosis, and life expectancy. Inquired about patient's wishes regarding extent of medical care to be provided including cardiopulmonary resuscitation and intubation with vent. Explained CPR with probable outcomes in detail while reassuring pt wife that treatment would still continue regardless of code status. Wife states she would need to discuss with her children before making a decision. Request PC follow up tomorrow.

## 2024-05-09 NOTE — PROGRESS NOTE ADULT - SUBJECTIVE AND OBJECTIVE BOX
Patient is a 83y old  Male who presents with a chief complaint of AFib w/ RVR (07 May 2024 08:54)  Patient seen in follow up for hypernatremia, MERRILL.        PAST MEDICAL HISTORY:  Diabetes Mellitus, Type II    CAD (Coronary Artery Disease)    Dyslipidemia    Asymptomatic Peripheral Vascular Disease    Osteomyelitis    COPD (chronic obstructive pulmonary disease)    Diabetes mellitus    Hypertension    PVD (peripheral vascular disease)    History of PAT (paroxysmal atrial tachycardia)    Asthma with COPD    BPH (benign prostatic hyperplasia)    Acute osteomyelitis      MEDICATIONS  (STANDING):  albuterol/ipratropium for Nebulization 3 milliLiter(s) Nebulizer every 6 hours  aMIOdarone    Tablet 400  Oral   aMIOdarone    Tablet 200 milliGRAM(s) Oral daily  apixaban 2.5 milliGRAM(s) Oral every 12 hours  aspirin  chewable 81 milliGRAM(s) Enteral Tube daily  atorvastatin 80 milliGRAM(s) Oral at bedtime  buDESOnide    Inhalation Suspension 0.5 milliGRAM(s) Inhalation two times a day  ciprofloxacin     Tablet 500 milliGRAM(s) Oral every 12 hours  dextrose 10% Bolus 125 milliLiter(s) IV Bolus once  dextrose 5%. 1000 milliLiter(s) (100 mL/Hr) IV Continuous <Continuous>  dextrose 5%. 1000 milliLiter(s) (50 mL/Hr) IV Continuous <Continuous>  dextrose 50% Injectable 25 Gram(s) IV Push once  dextrose 50% Injectable 12.5 Gram(s) IV Push once  glucagon  Injectable 1 milliGRAM(s) IntraMuscular once  insulin glargine Injectable (LANTUS) 15 Unit(s) SubCutaneous at bedtime  insulin lispro (ADMELOG) corrective regimen sliding scale   SubCutaneous at bedtime  insulin lispro (ADMELOG) corrective regimen sliding scale   SubCutaneous three times a day before meals  metoprolol tartrate 25 milliGRAM(s) Oral two times a day  montelukast 10 milliGRAM(s) Oral daily  nystatin    Suspension 609278 Unit(s) Oral three times a day    MEDICATIONS  (PRN):  acetaminophen   Oral Liquid .. 650 milliGRAM(s) Oral every 6 hours PRN Temp greater or equal to 38.5C (101.3F)  dextrose Oral Gel 15 Gram(s) Oral once PRN Blood Glucose LESS THAN 70 milliGRAM(s)/deciliter    T(C): 36.4 (05-09-24 @ 05:40), Max: 36.6 (05-08-24 @ 19:50)  HR: 94 (05-09-24 @ 13:50) (82 - 103)  BP: 122/73 (05-09-24 @ 05:40) (108/77 - 132/78)  RR: 18 (05-09-24 @ 05:40)  SpO2: 97% (05-09-24 @ 13:50)  Wt(kg): --  I&O's Detail    08 May 2024 07:01  -  09 May 2024 07:00  --------------------------------------------------------  IN:  Total IN: 0 mL    OUT:    Incontinent per Condom Catheter (mL): 700 mL  Total OUT: 700 mL    Total NET: -700 mL                  PHYSICAL EXAM:  General: No distress  Respiratory: b/l air entry  Cardiovascular: S1 S2  Gastrointestinal: soft  Extremities:  + edema                         LABORATORY:                        12.2   7.90  )-----------( 302      ( 09 May 2024 09:12 )             39.3     05-09    142  |  108  |  19  ----------------------------<  134<H>  3.8   |  30  |  1.50<H>    Ca    9.9      09 May 2024 09:12  Phos  3.4     05-09  Mg     2.0     05-09    TPro  6.2  /  Alb  2.6<L>  /  TBili  0.5  /  DBili  x   /  AST  28  /  ALT  23  /  AlkPhos  97  05-09    Sodium: 142 mmol/L (05-09 @ 09:12)  Sodium: 140 mmol/L (05-08 @ 07:40)    Potassium: 3.8 mmol/L (05-09 @ 09:12)  Potassium: 3.8 mmol/L (05-08 @ 07:40)    Hemoglobin: 12.2 g/dL (05-09 @ 09:12)  Hemoglobin: 12.3 g/dL (05-08 @ 07:40)  Hemoglobin: 12.1 g/dL (05-07 @ 07:34)    Creatinine, Serum 1.50 (05-09 @ 09:12)  Creatinine, Serum 1.50 (05-08 @ 07:40)  Creatinine, Serum 1.50 (05-07 @ 07:34)        LIVER FUNCTIONS - ( 09 May 2024 09:12 )  Alb: 2.6 g/dL / Pro: 6.2 g/dL / ALK PHOS: 97 U/L / ALT: 23 U/L / AST: 28 U/L / GGT: x           Urinalysis Basic - ( 09 May 2024 09:12 )    Color: x / Appearance: x / SG: x / pH: x  Gluc: 134 mg/dL / Ketone: x  / Bili: x / Urobili: x   Blood: x / Protein: x / Nitrite: x   Leuk Esterase: x / RBC: x / WBC x   Sq Epi: x / Non Sq Epi: x / Bacteria: x

## 2024-05-09 NOTE — PROGRESS NOTE ADULT - PROBLEM SELECTOR PLAN 6
Chronic, baseline 2L home O2 and BiPAP qHS   - c/w home montelukast   - Has not been getting Bipap overnight 2/2 increased secretions  - c/w Pulmicort BID and Duoneb Q6h  - Pulm (Dr. Combs) following, recs appreciated

## 2024-05-09 NOTE — PROGRESS NOTE ADULT - ATTENDING COMMENTS
Patient was seen and examined by myself. Case was discussed with house staff in details. I have reviewed and agree with the plan as outlined above with edits where appropriate.    HPI:  83yoM PMHx AFib on Eliquis, CAD, HTN, DM, HLD, COPD, osteomyelitis presents c/o shortness of breath, chest discomfort. Pt reports onset of rapid heart rate this morning, HR measured 160s when he checked his pulse ox. Also endorses dyspnea exacerbated by movement. Pt reports last episode of AFib in 2020, no episodes since then until today's presentation. Pt states that chest discomfort feels like chest pressure, no chest pain. Pt also reports chronic R big toe wound for the past few months, follows w/ Wound Care. States that he has increased sensitivity to R sole of foot, but denies pain, numbness/tingling. Denies fevers, chills, abdominal pain, N/V/D/C.     IN THE ED:  Temp 98  F ,  , /81  ,RR 18 , SpO2 94%  S/P PO , IV diltiazem 10mg x2, IV diltiazem gtt @ 10 mg/hr  Labs significant for BUN/Cr 25/1.6, troponin 507.9, pBNP 355  Imaging:   CXR wet read: no gross abnormalities (25 Apr 2024 12:13)      ROS: as in the HPI; all other ROS negative    SH and family history as above    Vital Signs Last 24 Hrs, stable         GEN: NAD, appears weak, not as awake as prior   HEENT- normocephalic; mouth moist, right facial drop   CVS- S1S2+, irregular   LUNGS- clear to auscultation; no wheezing  ABD: Soft , nontender, nondistended, Bowel sounds are present  EXTREMITY: trace Ankle swelling B/L   NEURO: AA; slurred speech, dysarthria, Right side weakness gross motor 1/5, Left  2/5   PSYCH: follows commend   BACK: no swelling or mass;   VASCULAR: distal peripheral pulses present  SKIN: warm and dry.       Labs and imaging reviewed      Patient presenting with Patient is a 83y old  Male who presents with a chief complaint of AFib w/ RVR (08 May 2024 16:50)   admitted for    A fib with RVR: now on eliquis and Amiodarone/lopressor, cardio eval noted   CVA: ASA, statin, PT/OT, will benefit from acute rehab. BP/Afib management , repeat CT today similar finding.   dysphagia: purred, aspiration precaution   Hypernatremia: d/c IVF, encourage po free water intake   DM : lantus with RISS , FS goal 100-180    oral thrush: nystatin S/S         Plan of care discussed with patient and wife at bedside  ;  all questions and concerns were addressed.

## 2024-05-09 NOTE — PROGRESS NOTE ADULT - SUBJECTIVE AND OBJECTIVE BOX
Date/Time Patient Seen:  		  Referring MD:   Data Reviewed	       Patient is a 83y old  Male who presents with a chief complaint of AFib w/ RVR (08 May 2024 16:50)      Subjective/HPI     PAST MEDICAL & SURGICAL HISTORY:  Diabetes Mellitus, Type II    CAD (Coronary Artery Disease)  s/p 3v CABG 2004; stents placed in [place] in 2019    Dyslipidemia    Asymptomatic Peripheral Vascular Disease    Osteomyelitis    COPD (chronic obstructive pulmonary disease)  on 2L at home and BiPAP at night; intubated 6/18    Diabetes mellitus    Hypertension    PVD (peripheral vascular disease)    History of PAT (paroxysmal atrial tachycardia)    Asthma with COPD    BPH (benign prostatic hyperplasia)    Acute osteomyelitis    CABG (Coronary Artery Bypass Graft)  2004    Compound fracture  left leg    S/P primary angioplasty with coronary stent    H/O drainage of abscess  Left femur 12/2021          Medication list         MEDICATIONS  (STANDING):  albuterol/ipratropium for Nebulization 3 milliLiter(s) Nebulizer every 6 hours  aMIOdarone    Tablet 400  Oral   aMIOdarone    Tablet 200 milliGRAM(s) Oral daily  apixaban 2.5 milliGRAM(s) Oral every 12 hours  aspirin  chewable 81 milliGRAM(s) Enteral Tube daily  atorvastatin 80 milliGRAM(s) Oral at bedtime  buDESOnide    Inhalation Suspension 0.5 milliGRAM(s) Inhalation two times a day  ciprofloxacin     Tablet 500 milliGRAM(s) Oral every 12 hours  dextrose 10% Bolus 125 milliLiter(s) IV Bolus once  dextrose 5%. 1000 milliLiter(s) (100 mL/Hr) IV Continuous <Continuous>  dextrose 5%. 1000 milliLiter(s) (50 mL/Hr) IV Continuous <Continuous>  dextrose 50% Injectable 25 Gram(s) IV Push once  dextrose 50% Injectable 12.5 Gram(s) IV Push once  glucagon  Injectable 1 milliGRAM(s) IntraMuscular once  insulin glargine Injectable (LANTUS) 15 Unit(s) SubCutaneous at bedtime  insulin lispro (ADMELOG) corrective regimen sliding scale   SubCutaneous three times a day before meals  insulin lispro (ADMELOG) corrective regimen sliding scale   SubCutaneous at bedtime  metoprolol tartrate 25 milliGRAM(s) Oral two times a day  montelukast 10 milliGRAM(s) Oral daily    MEDICATIONS  (PRN):  acetaminophen   Oral Liquid .. 650 milliGRAM(s) Oral every 6 hours PRN Temp greater or equal to 38.5C (101.3F)  dextrose Oral Gel 15 Gram(s) Oral once PRN Blood Glucose LESS THAN 70 milliGRAM(s)/deciliter         Vitals log        ICU Vital Signs Last 24 Hrs  T(C): 36.4 (09 May 2024 05:40), Max: 36.6 (08 May 2024 19:50)  T(F): 97.5 (09 May 2024 05:40), Max: 97.9 (08 May 2024 19:50)  HR: 99 (09 May 2024 05:40) (82 - 103)  BP: 122/73 (09 May 2024 05:40) (108/77 - 132/78)  BP(mean): --  ABP: --  ABP(mean): --  RR: 18 (09 May 2024 05:40) (18 - 19)  SpO2: 92% (09 May 2024 05:40) (88% - 100%)    O2 Parameters below as of 09 May 2024 05:40  Patient On (Oxygen Delivery Method): nasal cannula  O2 Flow (L/min): 2               Input and Output:  I&O's Detail    07 May 2024 07:01  -  08 May 2024 07:00  --------------------------------------------------------  IN:  Total IN: 0 mL    OUT:    Incontinent per Condom Catheter (mL): 1100 mL  Total OUT: 1100 mL    Total NET: -1100 mL      08 May 2024 07:01  -  09 May 2024 06:36  --------------------------------------------------------  IN:  Total IN: 0 mL    OUT:    Incontinent per Condom Catheter (mL): 700 mL  Total OUT: 700 mL    Total NET: -700 mL          Lab Data                        12.3   7.93  )-----------( 311      ( 08 May 2024 07:40 )             39.5     05-08    140  |  106  |  17  ----------------------------<  218<H>  3.8   |  30  |  1.50<H>    Ca    9.4      08 May 2024 07:40  Phos  2.8     05-07  Mg     2.1     05-07              Review of Systems	      Objective     Physical Examination    heart s1s2  lung dc bS  head nc      Pertinent Lab findings & Imaging      Eliecer:  NO   Adequate UO     I&O's Detail    07 May 2024 07:01  -  08 May 2024 07:00  --------------------------------------------------------  IN:  Total IN: 0 mL    OUT:    Incontinent per Condom Catheter (mL): 1100 mL  Total OUT: 1100 mL    Total NET: -1100 mL      08 May 2024 07:01  -  09 May 2024 06:36  --------------------------------------------------------  IN:  Total IN: 0 mL    OUT:    Incontinent per Condom Catheter (mL): 700 mL  Total OUT: 700 mL    Total NET: -700 mL               Discussed with:     Cultures:	        Radiology

## 2024-05-09 NOTE — PROGRESS NOTE ADULT - PROBLEM SELECTOR PLAN 4
Chronic, CKD3 baseline Cr 1.1  - Cr stable, continue to monitor  - Encourage PO intake  - Continue to hold home furosemide  - Monitor volume status closely  - Nephrology (Dr. Regan) following, recs appreciated

## 2024-05-09 NOTE — PROGRESS NOTE ADULT - PROBLEM SELECTOR PLAN 2
Chronic, on home Metoprolol BID and Eliquis   - TTE 4/26/2024: LV no well visualized however LV probably normal based on limited views, moderate thickening of aortic valve leaflets, trace TR, dilated IVC with normal inspiratory collapse, normal pulmonary artery pressure  - c/w Eliquis and PO amiodarone  - c/w lopressor 25mg BID  - Continue to monitor on tele  - Cardiology ([facility]) following, recs appreciated

## 2024-05-09 NOTE — PROGRESS NOTE ADULT - SUBJECTIVE AND OBJECTIVE BOX
Patient is a 83y old  Male who presents with a chief complaint of AFib w/ RVR (09 May 2024 11:47)      INTERVAL HPI/OVERNIGHT EVENTS: Patient seen and examined at bedside. No overnight events occurred. Pt much more awake today, however unable to articulate or produce audible words, only mouthing words. Also not following commands. Pt appears to comprehend, tries to answer questions, however unable to understand.     MEDICATIONS  (STANDING):  albuterol/ipratropium for Nebulization 3 milliLiter(s) Nebulizer every 6 hours  aMIOdarone    Tablet 400  Oral   aMIOdarone    Tablet 200 milliGRAM(s) Oral daily  apixaban 2.5 milliGRAM(s) Oral every 12 hours  aspirin  chewable 81 milliGRAM(s) Enteral Tube daily  atorvastatin 80 milliGRAM(s) Oral at bedtime  buDESOnide    Inhalation Suspension 0.5 milliGRAM(s) Inhalation two times a day  ciprofloxacin     Tablet 500 milliGRAM(s) Oral every 12 hours  dextrose 10% Bolus 125 milliLiter(s) IV Bolus once  dextrose 5%. 1000 milliLiter(s) (50 mL/Hr) IV Continuous <Continuous>  dextrose 5%. 1000 milliLiter(s) (100 mL/Hr) IV Continuous <Continuous>  dextrose 50% Injectable 25 Gram(s) IV Push once  dextrose 50% Injectable 12.5 Gram(s) IV Push once  glucagon  Injectable 1 milliGRAM(s) IntraMuscular once  insulin glargine Injectable (LANTUS) 15 Unit(s) SubCutaneous at bedtime  insulin lispro (ADMELOG) corrective regimen sliding scale   SubCutaneous at bedtime  insulin lispro (ADMELOG) corrective regimen sliding scale   SubCutaneous three times a day before meals  metoprolol tartrate 25 milliGRAM(s) Oral two times a day  montelukast 10 milliGRAM(s) Oral daily  nystatin    Suspension [id number] Unit(s) Oral three times a day    MEDICATIONS  (PRN):  acetaminophen   Oral Liquid .. 650 milliGRAM(s) Oral every 6 hours PRN Temp greater or equal to 38.5C (101.3F)  dextrose Oral Gel 15 Gram(s) Oral once PRN Blood Glucose LESS THAN 70 milliGRAM(s)/deciliter      Allergies    No Known Allergies    Intolerances        REVIEW OF SYSTEMS:  Unable to assess    Vital Signs Last 24 Hrs  T(C): 36.4 (09 May 2024 05:40), Max: 36.6 (08 May 2024 19:50)  T(F): 97.5 (09 May 2024 05:40), Max: 97.9 (08 May 2024 19:50)  HR: 98 (09 May 2024 08:02) (82 - 103)  BP: 122/73 (09 May 2024 05:40) (120/74 - 132/78)  BP(mean): --  RR: 18 (09 May 2024 05:40) (18 - 19)  SpO2: 98% (09 May 2024 08:02) (88% - 99%)    Parameters below as of 09 May 2024 08:02  Patient On (Oxygen Delivery Method): nasal cannula, 2 LPM        PHYSICAL EXAM:  GENERAL: NAD, sitting up in bed w/ NC in place  HEENT:  +thrush. PERRLA  CHEST/LUNG: +decreased breath sounds bn/l, mild wheezing. No accessory muscle use  HEART:  RRR, S1, S2  ABDOMEN:  BS+, soft, ND  EXTREMITIES: no edema, cyanosis, or calf tenderness  NERVOUS SYSTEM: Unable to accurately assess. Pt not following commands, does not move extremities on command. +aphasia.    LABS:                        12.2   7.90  )-----------( 302      ( 09 May 2024 09:12 )             39.3     CBC Full  -  ( 09 May 2024 09:12 )  WBC Count : 7.90 K/uL  Hemoglobin : 12.2 g/dL  Hematocrit : 39.3 %  Platelet Count - Automated : 302 K/uL  Mean Cell Volume : 86.4 fl  Mean Cell Hemoglobin : 26.8 pg  Mean Cell Hemoglobin Concentration : 31.0 gm/dL  Auto Neutrophil # : x  Auto Lymphocyte # : x  Auto Monocyte # : x  Auto Eosinophil # : x  Auto Basophil # : x  Auto Neutrophil % : x  Auto Lymphocyte % : x  Auto Monocyte % : x  Auto Eosinophil % : x  Auto Basophil % : x    09 May 2024 09:12    142    |  108    |  19     ----------------------------<  134    3.8     |  30     |  1.50     Ca    9.9        09 May 2024 09:12  Phos  3.4       09 May 2024 09:12  Mg     2.0       09 May 2024 09:12    TPro  6.2    /  Alb  2.6    /  TBili  0.5    /  DBili  x      /  AST  28     /  ALT  23     /  AlkPhos  97     09 May 2024 09:12      Urinalysis Basic - ( 09 May 2024 09:12 )    Color: x / Appearance: x / SG: x / pH: x  Gluc: 134 mg/dL / Ketone: x  / Bili: x / Urobili: x   Blood: x / Protein: x / Nitrite: x   Leuk Esterase: x / RBC: x / WBC x   Sq Epi: x / Non Sq Epi: x / Bacteria: x      CAPILLARY BLOOD GLUCOSE      POCT Blood Glucose.: 200 mg/dL (09 May 2024 12:17)  POCT Blood Glucose.: 119 mg/dL (09 May 2024 07:58)  POCT Blood Glucose.: 132 mg/dL (08 May 2024 22:18)  POCT Blood Glucose.: 139 mg/dL (08 May 2024 18:07)      Personally reviewed.     Consultant(s) Notes Reviewed:  [x] YES  [ ] NO

## 2024-05-09 NOTE — PROGRESS NOTE ADULT - ASSESSMENT
Hypernatremia: decreased free water intake  MERRILL on CKD 3: Prerenal azotemia  Hypotension, on midodrine  Diabetes  Atrial fibrillation  CVA  Hypokalemia    Stable renal indices. Monitor blood sugar levels. Insulin coverage as needed. PRN Potassium supplementation.   Monitor BP trend. Encourage PO intake as tolerated. Avoid nephrotoxic meds as possible. Will follow electrolytes and renal function trend.  Discussed with patients wife at the bedside.

## 2024-05-09 NOTE — PROGRESS NOTE ADULT - ASSESSMENT
83-year-old  black male with history of COPD coronary artery disease hypertension diabetes hyperlipidemia atrial fibrillation on Eliquis as well as osteomyelitis who presents now to the emergency department at [facility] complaining of rapid heart rate    jacy  copd  AF  OP  OA  CAD  HTN  DM  HLD  OM hx  CVA     Pureed diet  renal eval noted  on cipro - suppression ABX  s/p Nucala for Asthma - as per home rx regimen  s/p CVA tx - CT head repeat noted -   GOC documented - FULL CODE    follows with Dr Ryland ashley med  uses NIV - BIPAP night time for JACY - sleep hygiene reviewed  cardio eval - cvs rx regimen  BP control  DM care  AF rate and rhythm control  TFT  outpatient record reviewed  proventil PRN - Singulair - copd  spoke with WIFE  wound care

## 2024-05-09 NOTE — CASE MANAGEMENT PROGRESS NOTE - NSCMPROGRESSNOTE_GEN_ALL_CORE
Discussed pt in rounds. pt remains acute, NPO, plan for possible Peg placement. Receiving IVF. Gonzáles replaced by Urologist today.

## 2024-05-09 NOTE — PROGRESS NOTE ADULT - SUBJECTIVE AND OBJECTIVE BOX
Neurology Follow up note    YUE [id number]    HPI:  83yoM PMHx AFib on Eliquis, CAD, HTN, DM, HLD, COPD, osteomyelitis presents c/o shortness of breath, chest discomfort. Pt reports onset of rapid heart rate this morning, HR measured 160s when he checked his pulse ox. Also endorses dyspnea exacerbated by movement. Pt reports last episode of AFib in 2020, no episodes since then until today's presentation. Pt states that chest discomfort feels like chest pressure, no chest pain. Pt also reports chronic R big toe wound for the past few months, follows w/ [facility]ound Care. States that he has increased sensitivity to R sole of foot, but denies pain, numbness/tingling. Denies fevers, chills, abdominal pain, N/V/D/C.     IN THE ED:  Temp 98  F ,  , /81  ,RR 18 , SpO2 94%  S/P PO , IV diltiazem 10mg x2, IV diltiazem gtt @ 10 mg/hr  Labs significant for BUN/Cr 25/1.6, troponin 507.9, pBNP 355  Imaging:   CXR wet read: no gross abnormalities (25 Apr 2024 12:13)      Interval History -coughing    Patient is seen, chart was reviewed and case was discussed with the treatment team.  Pt is not in any distress.   Lying on bed comfortably.     .  Vital Signs Last 24 Hrs  T(C): 36.4 (09 May 2024 05:40), Max: 36.6 (08 May 2024 19:50)  T(F): 97.5 (09 May 2024 05:40), Max: 97.9 (08 May 2024 19:50)  HR: 94 (09 May 2024 17:54) (82 - 99)  BP: 154/73 (09 May 2024 17:54) (120/74 - 154/73)  BP(mean): --  RR: 18 (09 May 2024 17:54) (18 - 19)  SpO2: 94% (09 May 2024 17:54) (88% - 99%)    Parameters below as of 09 May 2024 17:54  Patient On (Oxygen Delivery Method): nasal cannula  O2 Flow (L/min): 2                                  REVIEW OF SYSTEMS:    Constitutional: No fever, weight loss or fatigue  Eyes: No eye pain, visual disturbances, or discharge  ENT:  No difficulty hearing, tinnitus, vertigo; No sinus or throat pain  Neck: No pain or stiffness  Respiratory: No wheezing, chills or hemoptysis  Cardiovascular: No chest pain, palpitations, shortness of breath, dizziness  Gastrointestinal: No abdominal or epigastric pain. No nausea, vomiting or hematemesis  Genitourinary: No dysuria, frequency, hematuria or incontinence  Neurological: No headaches, numbness or tremors  Psychiatric: No depression, anxiety, mood swings or difficulty sleeping  Musculoskeletal: No joint pain or swelling; No muscle, back or extremity pain  Skin: No itching, burning, rashes or lesions   Lymph Nodes: No enlarged glands  Endocrine: No heat or cold intolerance; No hair loss,   Allergy and Immunologic: No hives or eczema    On Neurological Examination:    Mental Status - Pt is alert, awake, oriented X3.. Follows commands well and able to answer questions appropriately.Mood and affect  normal    Speech -  MIXED APHASIA    Cranial Nerves - Pupils 3 mm equal and reactive to light, extraocular eye movements intact. Pt has no visual field deficit.  Pt has right facial weakness  . Facial sensation is intact.Tongue - is in midline.    Muscle tone - is decreased on right  .      Motor Exam -right hemiparesis; RUE 1/5; LE 1-2/5   No shaking or tremors.    Sensory Exam -. Pt withdraws all extremities equally on stimulation. No asymmetry seen. No complaints of tingling, numbness.        coordination:    Finger to nose: normal-left        Deep tendon Reflexes - 2 plus all over.            Neck Supple -  Yes.     MEDICATIONS  (STANDING):  albuterol/ipratropium for Nebulization 3 milliLiter(s) Nebulizer every 6 hours  aMIOdarone    Tablet 400 milliGRAM(s) Oral every 8 hours  aMIOdarone    Tablet 400  Oral   apixaban 2.5 milliGRAM(s) Oral every 12 hours  aspirin  chewable 81 milliGRAM(s) Enteral Tube daily  atorvastatin 80 milliGRAM(s) Oral at bedtime  buDESOnide    Inhalation Suspension 0.5 milliGRAM(s) Inhalation two times a day  ciprofloxacin   IVPB 400 milliGRAM(s) IV Intermittent every 12 hours  insulin lispro (ADMELOG) corrective regimen sliding scale   SubCutaneous every 6 hours  midodrine 10 milliGRAM(s) Oral every 8 hours    MEDICATIONS  (PRN):  acetaminophen   Oral Liquid .. 650 milliGRAM(s) Oral every 6 hours PRN Temp greater or equal to 38.5C (101.3F)          Allergies    No Known Allergies    Intolerances                          12.2   7.90  )-----------( 302      ( 09 May 2024 09:12 )             39.3               Hemoglobin A1C:     Vitamin B12     RADIOLOGY    ASSESSMENT AND PLAN:      Seen for right sided weakness/ams  consistent subacute left mca infarct  also multiple punctate subacute cerebellar infarcts  most likely cardio-embolic    aspiration precaution  ON AC and asa  continue statin  patient would benefit from acute rehab  no need for permissive HTN   Physical therapy evaluation.  Pain is accessed and addressed.  Plan of care was discussed with family(wife_. Questions answered.  Would continue to follow.

## 2024-05-09 NOTE — CONSULT NOTE ADULT - SUBJECTIVE AND OBJECTIVE BOX
Physical Medicine and Rehabilitation Initial Evaluation    Patient seen and examined at bedside, patients wife at bedside as well. We discussed at length the patients prior functional status, hsi current level of function and his relative progression while he has been here. We discussed inpatient, outpatient and home based therapy as well as alternatives of no therapy, we discussed the pace of progression required, participation requirements, qualifying diagnoses, among other topics. We ultimately decided he would likely be a candidate for SUSI given hsi significant functional decline and given his relatively poor progression/lack of progression. We discussed his prognosis for motor recovery.    The patient was seen and examined at bedside.    ROS: Not able to meaningfully obtain due to aphasia/dystarthria    PAST MEDICAL & SURGICAL HISTORY:  Diabetes Mellitus, Type II  CAD (Coronary Artery Disease)  s/p 3v CABG 2004; stents placed in [place] in 2019  Dyslipidemia  Osteomyelitis  COPD (chronic obstructive pulmonary disease)  on 2L at home and BiPAP at night; intubated 6/18  Hypertension  PVD (peripheral vascular disease)  History of PAT (paroxysmal atrial tachycardia)  Asthma with COPD  BPH (benign prostatic hyperplasia)  Acute osteomyelitis  CABG (Coronary Artery Bypass Graft)  2004  Compound fracture  left leg  S/P primary angioplasty with coronary stent  /O drainage of abscess  Left femur 12/2021    Medications: reviewed    Physical Exam:   Vitals: reviewed    Constitutional: Gen: In no acute distress  Neuro: R facial droop, RUE and RLE hemiplegia, LUE and LLE are at least 3/5, sensation intact in the RUE and RLE  Psychiatric: Awake but not particularly participating/following commands except simple responses yes/no which as consistent

## 2024-05-09 NOTE — PROGRESS NOTE ADULT - PROBLEM SELECTOR PROBLEM 4
[Action and use of Insulin] : action and use of short and long-acting insulin [Hypoglycemia Management] : hypoglycemia management [Importance of Diet and Exercise] : importance of diet and exercise to improve glycemic control, achieve weight loss and improve cardiovascular health [Self Monitoring of Blood Glucose] : self monitoring of blood glucose [FreeTextEntry1] : 61 y/o M pt with:\par \par 1. T2DM with multiple complications included, proteinuria, and LVH\par Pt's management of DM has improved ,no osmotic diuresis .\par Diabetes treatment target goals discuss.\par Most recent A1c: 7,2%, \par Once his life style is consistent, will decrease glimepiride to 1 mg qd\par Needs funduscopic exam\par \par 2-Hyperlipidemia, At increase risk for cardiac events, Weight reduction, is on statins\par Sees cardiologist\par \par Return in 4 months\par \par \par \par \par \par  Acute kidney injury superimposed on CKD

## 2024-05-09 NOTE — PROGRESS NOTE ADULT - PROBLEM SELECTOR PLAN 1
MRI Head: Posterior left MCA vascular ischemic stroke. No hemorrhagic conversion, repeat CTs w/ no acute change  - Most likely cardio-embolic, c/w eliquis  - Pt appears to be neurologically regressing, will obtain repeat non-con CTH to r/o hemorrhagic conversion  - C/w ASA and lipitor qd  - Neuro checks per protocol  - Tolerating pureed diet  - Fall and aspiration precautions   - Physiatry consulted, recs appreciated

## 2024-05-10 NOTE — PROGRESS NOTE ADULT - SUBJECTIVE AND OBJECTIVE BOX
Neurology Follow up note    YUE [id number]    HPI:  83yoM PMHx AFib on Eliquis, CAD, HTN, DM, HLD, COPD, osteomyelitis presents c/o shortness of breath, chest discomfort. Pt reports onset of rapid heart rate this morning, HR measured 160s when he checked his pulse ox. Also endorses dyspnea exacerbated by movement. Pt reports last episode of AFib in 2020, no episodes since then until today's presentation. Pt states that chest discomfort feels like chest pressure, no chest pain. Pt also reports chronic R big toe wound for the past few months, follows w/ Wound Care. States that he has increased sensitivity to R sole of foot, but denies pain, numbness/tingling. Denies fevers, chills, abdominal pain, N/V/D/C.     IN THE ED:  Temp 98  F ,  , /81  ,RR 18 , SpO2 94%  S/P PO , IV diltiazem 10mg x2, IV diltiazem gtt @ 10 mg/hr  Labs significant for BUN/Cr 25/1.6, troponin 507.9, pBNP 355  Imaging:   CXR wet read: no gross abnormalities (25 Apr 2024 12:13)      Interval History -more interactive    Patient is seen, chart was reviewed and case was discussed with the treatment team.  Pt is not in any distress.   Lying on bed comfortably.     .  Vital Signs Last 24 Hrs  T(C): 36.6 (10 May 2024 12:08), Max: 36.7 (09 May 2024 21:19)  T(F): 97.8 (10 May 2024 12:08), Max: 98 (09 May 2024 21:19)  HR: 90 (10 May 2024 13:30) (78 - 95)  BP: 126/75 (10 May 2024 12:08) (115/76 - 154/73)  BP(mean): --  RR: 18 (10 May 2024 12:08) (17 - 18)  SpO2: 95% (10 May 2024 13:30) (94% - 99%)    Parameters below as of 10 May 2024 13:30  Patient On (Oxygen Delivery Method): nasal cannula                                    REVIEW OF SYSTEMS:    Constitutional: No fever, weight loss or fatigue  Eyes: No eye pain, visual disturbances, or discharge  ENT:  No difficulty hearing, tinnitus, vertigo; No sinus or throat pain  Neck: No pain or stiffness  Respiratory: No wheezing, chills or hemoptysis  Cardiovascular: No chest pain, palpitations, shortness of breath, dizziness  Gastrointestinal: No abdominal or epigastric pain. No nausea, vomiting or hematemesis  Genitourinary: No dysuria, frequency, hematuria or incontinence  Neurological: No headaches, numbness or tremors  Psychiatric: No depression, anxiety, mood swings or difficulty sleeping  Musculoskeletal: No joint pain or swelling; No muscle, back or extremity pain  Skin: No itching, burning, rashes or lesions   Lymph Nodes: No enlarged glands  Endocrine: No heat or cold intolerance; No hair loss,   Allergy and Immunologic: No hives or eczema    On Neurological Examination:    Mental Status - Pt is alert, awake, oriented X3.. Follows commands well and able to answer questions appropriately.Mood and affect  normal    Speech -  MIXED APHASIA    Cranial Nerves - Pupils 3 mm equal and reactive to light, extraocular eye movements intact. Pt has no visual field deficit.  Pt has right facial weakness  . Facial sensation is intact.Tongue - is in midline.    Muscle tone - is decreased on right  .      Motor Exam -right hemiparesis; RUE 1/5; LE 1-2/5   No shaking or tremors.    Sensory Exam -. Pt withdraws all extremities equally on stimulation. No asymmetry seen. No complaints of tingling, numbness.        coordination:    Finger to nose: normal-left        Deep tendon Reflexes - 2 plus all over.            Neck Supple -  Yes.      MEDICATIONS  (STANDING):  albuterol/ipratropium for Nebulization 3 milliLiter(s) Nebulizer every 6 hours  aMIOdarone    Tablet 400  Oral   aMIOdarone    Tablet 200 milliGRAM(s) Oral daily  apixaban 2.5 milliGRAM(s) Oral every 12 hours  aspirin  chewable 81 milliGRAM(s) Enteral Tube daily  atorvastatin 80 milliGRAM(s) Oral at bedtime  buDESOnide    Inhalation Suspension 0.5 milliGRAM(s) Inhalation two times a day  ciprofloxacin     Tablet 500 milliGRAM(s) Oral every 12 hours  dextrose 10% Bolus 125 milliLiter(s) IV Bolus once  dextrose 5%. 1000 milliLiter(s) (100 mL/Hr) IV Continuous <Continuous>  dextrose 5%. 1000 milliLiter(s) (50 mL/Hr) IV Continuous <Continuous>  dextrose 50% Injectable 25 Gram(s) IV Push once  dextrose 50% Injectable 12.5 Gram(s) IV Push once  glucagon  Injectable 1 milliGRAM(s) IntraMuscular once  insulin glargine Injectable (LANTUS) 15 Unit(s) SubCutaneous at bedtime  insulin lispro (ADMELOG) corrective regimen sliding scale   SubCutaneous three times a day before meals  insulin lispro (ADMELOG) corrective regimen sliding scale   SubCutaneous at bedtime  metoprolol tartrate 25 milliGRAM(s) Oral two times a day  montelukast 10 milliGRAM(s) Oral daily  nystatin    Suspension [id number] Unit(s) Oral three times a day    MEDICATIONS  (PRN):  acetaminophen   Oral Liquid .. 650 milliGRAM(s) Oral every 6 hours PRN Temp greater or equal to 38.5C (101.3F)  dextrose Oral Gel 15 Gram(s) Oral once PRN Blood Glucose LESS THAN 70 milliGRAM(s)/deciliter            Allergies    No Known Allergies    Intolerances               05-10    143  |  108  |  24<H>  ----------------------------<  203<H>  4.1   |  31  |  1.60<H>    Ca    9.6      10 May 2024 13:30  Phos  3.3     05-10  Mg     2.2     05-10    TPro  6.2  /  Alb  2.6<L>  /  TBili  0.5  /  DBili  x   /  AST  28  /  ALT  23  /  AlkPhos  97  05-09                            12.0   7.82  )-----------( 333      ( 10 May 2024 11:20 )             39.0             Hemoglobin A1C:     Vitamin B12     RADIOLOGY    ASSESSMENT AND PLAN:      Seen for right sided weakness/ams  consistent subacute left mca infarct(missed one dose of apixaban)  also multiple punctate subacute cerebellar infarcts  most likely cardio-embolic    accepted in acute rehab  aspiration precaution  ON AC and asa  continue statin  Physical therapy evaluation.  Pain is accessed and addressed.  Plan of care was discussed with family(wife_. Questions answered.  Would continue to follow.

## 2024-05-10 NOTE — PROGRESS NOTE ADULT - ATTENDING COMMENTS
Patient was seen and examined by myself. Case was discussed with house staff in details. I have reviewed and agree with the plan as outlined above with edits where appropriate.    HPI:  83yoM PMHx AFib on Eliquis, CAD, HTN, DM, HLD, COPD, osteomyelitis presents c/o shortness of breath, chest discomfort. Pt reports onset of rapid heart rate this morning, HR measured 160s when he checked his pulse ox. Also endorses dyspnea exacerbated by movement. Pt reports last episode of AFib in 2020, no episodes since then until today's presentation. Pt states that chest discomfort feels like chest pressure, no chest pain. Pt also reports chronic R big toe wound for the past few months, follows w/ Wound Care. States that he has increased sensitivity to R sole of foot, but denies pain, numbness/tingling. Denies fevers, chills, abdominal pain, N/V/D/C.     IN THE ED:  Temp 98  F ,  , /81  ,RR 18 , SpO2 94%  S/P PO , IV diltiazem 10mg x2, IV diltiazem gtt @ 10 mg/hr  Labs significant for BUN/Cr 25/1.6, troponin 507.9, pBNP 355  Imaging:   CXR wet read: no gross abnormalities (25 Apr 2024 12:13)      ROS: as in the HPI; all other ROS negative    SH and family history as above    Vital Signs Last 24 Hrs, stable         GEN: NAD, appears weak, more awake   HEENT- normocephalic; mouth moist, right facial drop   CVS- S1S2+, irregular   LUNGS- clear to auscultation; no wheezing  ABD: Soft , nontender, nondistended, Bowel sounds are present  EXTREMITY: trace Ankle swelling B/L   NEURO: AA; slurred speech, dysarthria, Right side weakness gross motor 1/5, Left  2/5   PSYCH: follows commend   BACK: no swelling or mass;   VASCULAR: distal peripheral pulses present  SKIN: warm and dry.       Labs and imaging reviewed      Patient presenting with Patient is a 83y old  Male who presents with a chief complaint of AFib w/ RVR (08 May 2024 16:50)   admitted for    A fib with RVR: now on eliquis and Amiodarone/lopressor, cardio eval noted   CVA: ASA, statin, PT/OT, BP/Afib management , repeat CT today similar finding. discussed with physiatry. acute VS subacute rehab.   dysphagia: purred, aspiration precaution, wife states his oral intake is very limited. nutritional supplement added, calorie count, option of Peg placement discussed if oral intake inadequate.      Hypernatremia: resolved  MERRILL: BUN/Cr increased if worsen, will consider IVF   DM : lantus with RISS , FS goal 100-180    oral thrush: nystatin S/S         Plan of care discussed with patient and wife at bedside  ;  all questions and concerns were addressed.

## 2024-05-10 NOTE — PROGRESS NOTE ADULT - SUBJECTIVE AND OBJECTIVE BOX
Date/Time Patient Seen:  		  Referring MD:   Data Reviewed	       Patient is a 83y old  Male who presents with a chief complaint of AFib w/ RVR (09 May 2024 18:12)      Subjective/HPI     PAST MEDICAL & SURGICAL HISTORY:  Diabetes Mellitus, Type II    CAD (Coronary Artery Disease)  s/p 3v CABG 2004; stents placed in [place] in 2019    Dyslipidemia    Asymptomatic Peripheral Vascular Disease    Osteomyelitis    COPD (chronic obstructive pulmonary disease)  on 2L at home and BiPAP at night; intubated 6/18    Diabetes mellitus    Hypertension    PVD (peripheral vascular disease)    History of PAT (paroxysmal atrial tachycardia)    Asthma with COPD    BPH (benign prostatic hyperplasia)    Acute osteomyelitis    CABG (Coronary Artery Bypass Graft)  2004    Compound fracture  left leg    S/P primary angioplasty with coronary stent    H/O drainage of abscess  Left femur 12/2021          Medication list         MEDICATIONS  (STANDING):  albuterol/ipratropium for Nebulization 3 milliLiter(s) Nebulizer every 6 hours  aMIOdarone    Tablet 400  Oral   aMIOdarone    Tablet 200 milliGRAM(s) Oral daily  apixaban 2.5 milliGRAM(s) Oral every 12 hours  aspirin  chewable 81 milliGRAM(s) Enteral Tube daily  atorvastatin 80 milliGRAM(s) Oral at bedtime  buDESOnide    Inhalation Suspension 0.5 milliGRAM(s) Inhalation two times a day  ciprofloxacin     Tablet 500 milliGRAM(s) Oral every 12 hours  dextrose 10% Bolus 125 milliLiter(s) IV Bolus once  dextrose 5%. 1000 milliLiter(s) (100 mL/Hr) IV Continuous <Continuous>  dextrose 5%. 1000 milliLiter(s) (50 mL/Hr) IV Continuous <Continuous>  dextrose 50% Injectable 25 Gram(s) IV Push once  dextrose 50% Injectable 12.5 Gram(s) IV Push once  glucagon  Injectable 1 milliGRAM(s) IntraMuscular once  insulin glargine Injectable (LANTUS) 15 Unit(s) SubCutaneous at bedtime  insulin lispro (ADMELOG) corrective regimen sliding scale   SubCutaneous three times a day before meals  insulin lispro (ADMELOG) corrective regimen sliding scale   SubCutaneous at bedtime  metoprolol tartrate 25 milliGRAM(s) Oral two times a day  montelukast 10 milliGRAM(s) Oral daily  nystatin    Suspension [id number] Unit(s) Oral three times a day    MEDICATIONS  (PRN):  acetaminophen   Oral Liquid .. 650 milliGRAM(s) Oral every 6 hours PRN Temp greater or equal to 38.5C (101.3F)  dextrose Oral Gel 15 Gram(s) Oral once PRN Blood Glucose LESS THAN 70 milliGRAM(s)/deciliter         Vitals log        ICU Vital Signs Last 24 Hrs  T(C): 36.7 (10 May 2024 05:22), Max: 36.7 (09 May 2024 21:19)  T(F): 98 (10 May 2024 05:22), Max: 98 (09 May 2024 21:19)  HR: 90 (10 May 2024 05:22) (78 - 98)  BP: 118/83 (10 May 2024 05:22) (115/76 - 154/73)  BP(mean): --  ABP: --  ABP(mean): --  RR: 18 (10 May 2024 05:22) (17 - 18)  SpO2: 95% (10 May 2024 05:22) (94% - 98%)    O2 Parameters below as of 10 May 2024 05:22  Patient On (Oxygen Delivery Method): nasal cannula  O2 Flow (L/min): 2               Input and Output:  I&O's Detail    08 May 2024 07:01  -  09 May 2024 07:00  --------------------------------------------------------  IN:  Total IN: 0 mL    OUT:    Incontinent per Condom Catheter (mL): 700 mL  Total OUT: 700 mL    Total NET: -700 mL      09 May 2024 07:01  -  10 May 2024 05:56  --------------------------------------------------------  IN:  Total IN: 0 mL    OUT:    Incontinent per Condom Catheter (mL): 900 mL  Total OUT: 900 mL    Total NET: -900 mL          Lab Data                        12.2   7.90  )-----------( 302      ( 09 May 2024 09:12 )             39.3     05-09    142  |  108  |  19  ----------------------------<  134<H>  3.8   |  30  |  1.50<H>    Ca    9.9      09 May 2024 09:12  Phos  3.4     05-09  Mg     2.0     05-09    TPro  6.2  /  Alb  2.6<L>  /  TBili  0.5  /  DBili  x   /  AST  28  /  ALT  23  /  AlkPhos  97  05-09            Review of Systems	      Objective     Physical Examination  heart s1s2  lung dc BS  head nc        Pertinent Lab findings & Imaging      Eliecer:  NO   Adequate UO     I&O's Detail    08 May 2024 07:01  -  09 May 2024 07:00  --------------------------------------------------------  IN:  Total IN: 0 mL    OUT:    Incontinent per Condom Catheter (mL): 700 mL  Total OUT: 700 mL    Total NET: -700 mL      09 May 2024 07:01  -  10 May 2024 05:57  --------------------------------------------------------  IN:  Total IN: 0 mL    OUT:    Incontinent per Condom Catheter (mL): 900 mL  Total OUT: 900 mL    Total NET: -900 mL               Discussed with:     Cultures:	        Radiology

## 2024-05-10 NOTE — PROGRESS NOTE ADULT - SUBJECTIVE AND OBJECTIVE BOX
Physical Medicine and Rehabilitation subsequent Evaluation    The patient was seen and examined at bedside. A bit more alert and participatory today.  Therapy notes reviewed, little to no progression noted across PT/OT.    ROS: No pain, + dysarthric speech, + weakness in RLE and RUE, R face    PAST MEDICAL & SURGICAL HISTORY:  Diabetes Mellitus, Type II  CAD (Coronary Artery Disease)  s/p 3v CABG 2004; stents placed in [place] in 2019  Dyslipidemia  Osteomyelitis  COPD (chronic obstructive pulmonary disease)  on 2L at home and BiPAP at night; intubated 6/18  Hypertension  PVD (peripheral vascular disease)  History of PAT (paroxysmal atrial tachycardia)  Asthma with COPD  BPH (benign prostatic hyperplasia)  Acute osteomyelitis  CABG (Coronary Artery Bypass Graft)  2004  Compound fracture  left leg  S/P primary angioplasty with coronary stent  /O drainage of abscess  Left femur 12/2021    Medications: reviewed    Physical Exam:   Vitals: reviewed    Constitutional: Gen: In no acute distress  Neuro: R facial droop, RUE and RLE hemiplegia, LUE and LLE are at least 3/5, sensation intact in the RUE and RLE  Psychiatric: Awake but not particularly participating/following commands except simple responses yes/no which as consistent

## 2024-05-10 NOTE — PROGRESS NOTE ADULT - PROBLEM SELECTOR PLAN 3
Chronic, on home insulin (Lantus 10u)  - c/w MDISS and Lantus 15 units sQ QHS   - Hypoglycemia protocol in place

## 2024-05-10 NOTE — PROGRESS NOTE ADULT - ASSESSMENT
83-year-old  black male with history of COPD coronary artery disease hypertension diabetes hyperlipidemia atrial fibrillation on Eliquis as well as osteomyelitis who presents now to the emergency department at [facility] complaining of rapid heart rate    jacy  copd  AF  OP  OA  CAD  HTN  DM  HLD  OM hx  CVA     Pureed diet  on cipro - suppression ABX  s/p Nucala for Asthma - as per home rx regimen  s/p CVA tx - CT head repeat noted -   GOC documented - DNR DNI    follows with Dr Ryland ashley med  uses NIV - BIPAP night time for JACY - sleep hygiene reviewed  cardio eval - cvs rx regimen  BP control  DM care  AF rate and rhythm control  TFT  outpatient record reviewed  proventil PRN - Singulair - copd  spoke with WIFE  wound care

## 2024-05-10 NOTE — PROGRESS NOTE ADULT - ASSESSMENT
A/P 84y year old Male with CVA, R hemiplegia, ambulatory dysfunction, muscle weakness, dysphagia, aphasia, dysarthria    Remains a good candidate for [facility] -- no meaningful progression and significant functional deficits. Doubt his ability to perform 3 hours therapy daily despite qualifying dx of CVA, and doubtful of discharge to home/community -- will likely need S[facility]I given magnitude of deficits.    Primary team notes are reviewed  Therapy notes are reviewed  Discussed management/coordinated care with primary team/referring provider.    36 minutes spent during patient encounter:    ¦ preparing to see the patient   ¦ performing a medically appropriate examination and/or evaluation   ¦ referring and communicating with other health care professionals  ¦ documenting clinical information in the electronic or other health record   ¦ care coordination

## 2024-05-10 NOTE — SOCIAL WORK PROGRESS NOTE - NSSWPROGRESSNOTE_GEN_ALL_CORE
As per MD, pt not ready for DC, no weekend DC as MD assessing for possible PEG, SW alerted facility and family and will continue to follow to ensure safe transition to subacute rehab when medically stable.

## 2024-05-10 NOTE — CASE MANAGEMENT PROGRESS NOTE - NSCMPROGRESSNOTE_GEN_ALL_CORE
SW following for rehab placement. Pt started on pureed diet, to start on calorie count. Per MD note may need peg.

## 2024-05-10 NOTE — PROGRESS NOTE ADULT - ASSESSMENT
83-year-old M with history of COPD on home O2 PRN, coronary artery disease s/p CABG,  hypertension, diabetes, hyperlipidemia, atrial fibrillation on Eliquis as well as chronic osteomyelitis who presents to the emergency department at [facility] complaining of rapid heart rate. Cardiology consulted for afib with rvr.    CAD, CABG, HTN, HLD, A fib  - p/w PAF with RVR with SOB at home in ED s/p Cardizem gtt, admits to had missed home BB   - on the morning of 4/27, patient was noted to have new onset aphasia and a code stroke was called.  He was deemed not a candidate for TNK as he is on AC.  He was found to have a L M3 occlusion but was deemed not a candidate for thrombectomy as occlusion too distal.    - Later that night, a RRT was called due to worsening NIH score noted by nursing.   - Repeat CT brain demonstrated moderate-sized evolving acute/subacute L MCA territory infarct but no hemorrhagic conversion.   - neuro following   - Continue ASA and statin     - Tele: SR 80's with no events recorded x 48 hours, can dc tele   - s/p amio load , continue amio  200mg PO daily  - Continue Eliquis 2.5  mg BID. Dose reduced in the setting of renal function and age. Previous Cr of 1.4, if his renal function goes back to baseline of 1.4 would place on full dose Eliquis for CVA protection  - BP stable and controlled per flow sheet   - continue Lopressor 25mg q12 with hold parameters    - Monitor and replete Lytes. Keep K > 4 and Mg > 2    - DC planning to [facility]I per primary   - Will continue to follow.    Nikki Chan [facility]  Nurse Practitioner - Cardiology   call TEAMS

## 2024-05-10 NOTE — PROGRESS NOTE ADULT - SUBJECTIVE AND OBJECTIVE BOX
[facility] -- Génesis Villavicencio Pannella, Patel, Carolina Heath Cohen  Office # [phone]    Follow Up:    CAD, AF, CVA    Subjective/Observations: seen and examined,  falling asleep during exam, wife at bedside, NAD, unable to provide meaningful information at this time on O2 via NC. no events overnight     REVIEW OF SYSTEMS: All other review of systems is negative unless indicated above  PAST MEDICAL & SURGICAL HISTORY:  Diabetes Mellitus, Type II      CAD (Coronary Artery Disease)  s/p 3v CABG 2004; stents placed in [place] in 2019      Dyslipidemia      Osteomyelitis      COPD (chronic obstructive pulmonary disease)  on 2L at home and BiPAP at night; intubated 6/18      Hypertension      PVD (peripheral vascular disease)      History of PAT (paroxysmal atrial tachycardia)      Asthma with COPD      BPH (benign prostatic hyperplasia)      Acute osteomyelitis      CABG (Coronary Artery Bypass Graft)  2004      Compound fracture  left leg      S/P primary angioplasty with coronary stent      H/O drainage of abscess  Left femur 12/2021        MEDICATIONS  (STANDING):  albuterol/ipratropium for Nebulization 3 milliLiter(s) Nebulizer every 6 hours  aMIOdarone    Tablet 400  Oral   aMIOdarone    Tablet 200 milliGRAM(s) Oral daily  apixaban 2.5 milliGRAM(s) Oral every 12 hours  aspirin  chewable 81 milliGRAM(s) Enteral Tube daily  atorvastatin 80 milliGRAM(s) Oral at bedtime  buDESOnide    Inhalation Suspension 0.5 milliGRAM(s) Inhalation two times a day  ciprofloxacin     Tablet 500 milliGRAM(s) Oral every 12 hours  dextrose 10% Bolus 125 milliLiter(s) IV Bolus once  dextrose 5%. 1000 milliLiter(s) (100 mL/Hr) IV Continuous <Continuous>  dextrose 5%. 1000 milliLiter(s) (50 mL/Hr) IV Continuous <Continuous>  dextrose 50% Injectable 25 Gram(s) IV Push once  dextrose 50% Injectable 12.5 Gram(s) IV Push once  glucagon  Injectable 1 milliGRAM(s) IntraMuscular once  insulin glargine Injectable (LANTUS) 15 Unit(s) SubCutaneous at bedtime  insulin lispro (ADMELOG) corrective regimen sliding scale   SubCutaneous three times a day before meals  insulin lispro (ADMELOG) corrective regimen sliding scale   SubCutaneous at bedtime  metoprolol tartrate 25 milliGRAM(s) Oral two times a day  montelukast 10 milliGRAM(s) Oral daily  nystatin    Suspension 948176 Unit(s) Oral three times a day    MEDICATIONS  (PRN):  acetaminophen   Oral Liquid .. 650 milliGRAM(s) Oral every 6 hours PRN Temp greater or equal to 38.5C (101.3F)  dextrose Oral Gel 15 Gram(s) Oral once PRN Blood Glucose LESS THAN 70 milliGRAM(s)/deciliter    Allergies    No Known Allergies    Intolerances      Vital Signs Last 24 Hrs  T(C): 36.6 (10 May 2024 12:08), Max: 36.7 (09 May 2024 21:19)  T(F): 97.8 (10 May 2024 12:08), Max: 98 (09 May 2024 21:19)  HR: 93 (10 May 2024 12:08) (78 - 95)  BP: 126/75 (10 May 2024 12:08) (115/76 - 154/73)  BP(mean): --  RR: 18 (10 May 2024 12:08) (17 - 18)  SpO2: 97% (10 May 2024 12:08) (94% - 99%)    Parameters below as of 10 May 2024 12:08  Patient On (Oxygen Delivery Method): nasal cannula  O2 Flow (L/min): 2    I&O's Summary    09 May 2024 07:01  -  10 May 2024 07:00  --------------------------------------------------------  IN: 0 mL / OUT: 900 mL / NET: -900 mL          TELE: SR 80's   PHYSICAL EXAM:  Constitutional: NAD, awake and alert  HEENT: Moist Mucous Membranes, Anicteric  Pulmonary: Non-labored, breath sounds are clear bilaterally, No wheezing, rales or rhonchi  Cardiovascular: Regular, S1 and S2, No murmurs, rubs, gallops or clicks  Gastrointestinal: Bowel Sounds present, soft, nontender.   Lymph: No peripheral edema. No lymphadenopathy.  Skin: No visible rashes or ulcers.  Psych:  Mood & affect appropriate  LABS: All Labs Reviewed:                        12.0   7.82  )-----------( 333      ( 10 May 2024 11:20 )             39.0                         12.2   7.90  )-----------( 302      ( 09 May 2024 09:12 )             39.3                         12.3   7.93  )-----------( 311      ( 08 May 2024 07:40 )             39.5     09 May 2024 09:12    142    |  108    |  19     ----------------------------<  134    3.8     |  30     |  1.50   08 May 2024 07:40    140    |  106    |  17     ----------------------------<  218    3.8     |  30     |  1.50     Ca    9.9        09 May 2024 09:12  Ca    9.4        08 May 2024 07:40  Phos  3.4       09 May 2024 09:12  Mg     2.0       09 May 2024 09:12    TPro  6.2    /  Alb  2.6    /  TBili  0.5    /  DBili  x      /  AST  28     /  ALT  23     /  AlkPhos  97     09 May 2024 09:12          12 Lead ECG:   Ventricular Rate 95 BPM    Atrial Rate 95 BPM    P-R Interval 136 ms    QRS Duration 86 ms    Q-T Interval 352 ms    QTC Calculation(Bazett) 442 ms    P Axis 84 degrees    R Axis 88 degrees    T Axis 58 degrees    Diagnosis Line Normal sinus rhythm  Low voltage QRS  possible BREANNA  Borderline ECG    Confirmed by Cookie Ordaz ([id number]) on 5/2/2024 2:38:25 PM (05-02-24 @ 09:49)      TRANSTHORACIC ECHOCARDIOGRAM REPORT  ________________________________________________________________________________                                      _______       Pt. Name:       YUE LAUREANO  Study Date:    4/26/2024  MRN:            [id number]            YOB: 1940  Accession #:    [id number]           Age:           83 years  Account#:       [id number]          Gender:        M  Visit ID#  Heart Rate:                         Height:        70.08 in (178.00 cm)  Rhythm:                            Weight:        220.46 lb (100.00 kg)  Blood Pressure: 158/69 mmHg         BSA/BMI:       2.18 m² / 31.56 kg/m²  ________________________________________________________________________________________  Referring Physician:   [id number] Marc Grossman  Interpreting Physician: Cookie Ordaz MD  Primary Sonographer:    Clayton Wilkinson    CPT:               ECHO TTE WO CON COMP W DOPP - 07799.m  Indication(s):     Unspecified atrial fibrillation - I48.91  Procedure:         Transthoracic echocardiogram with 2-D, M-mode and complete                     spectral and color flow Doppler.  Ordering Location: [facility]  Admission Status:  Inpatient  Study Information: Image quality for this study is technically difficult.    _______________________________________________________________________________________     CONCLUSIONS:      1. Technically difficult image quality.   2. The LV is not well visualized, however the LV function is probably normal based on limited views.   3. The right ventricle is not well visualized. probably normal systolic function.   4. There is moderate thickening of the aortic valve leaflets.   5. Structurally normal mitral valve with normal leaflet excursion.   6. Trace tricuspid regurgitation.   7. The inferior vena cava is dilated measuring 2.18 cm in diameter, (dilated >2.1cm) with normal inspiratory collapse (normal >50%) consistent with mildly elevated right atrial pressure (~8, range 5-10mmHg).   8. Estimated pulmonary artery systolic pressure is 26 mmHg, consistent with normal pulmonary artery pressure.    ________________________________________________________________________________________  FINDINGS:     Left Ventricle:  There is normal left ventricular diastolic function. Left ventricular endocardium is not well visualized.     Right Ventricle:  The right ventricle is not well visualized. Probably normal systolic function.     Left Atrium:  The left atrium is normal in size with an indexed volume of 16.30 ml/m².     Right Atrium:  The right atrium is normal in size.     Aortic Valve:  The aortic valve anatomy cannot be determined with normal systolic excursion. There is mild calcification of the aortic valve leaflets. There is moderate thickening of the aortic valve leaflets.     Mitral Valve:  Structurally normal mitral valve with normal leaflet excursion. There is trace mitral regurgitation.     Tricuspid Valve:  The tricuspid valve was not well visualized. There is trace tricuspid regurgitation. Estimated pulmonary artery systolic pressure is 26 mmHg, consistent with normal pulmonary artery pressure.     Pulmonic Valve:  The pulmonic valve was not well visualized.     Aorta:  The aortic root at the sinuses of Valsalva is normal in size, measuring 3.30 cm (indexed 1.52 cm/m²).     Systemic Veins:  The inferior vena cava is dilated measuring 2.18 cm in diameter, (dilated >2.1cm) with normal inspiratory collapse (normal >50%) consistent with mildly elevated right atrial pressure (~8, range 5-10mmHg).  ____________________________________________________________________  QUANTITATIVE DATA:  Left Ventricle Measurements: (Indexed to BSA)     IVSd (2D):   1.1 cm  LVPWd (2D):  0.9 cm  LVIDd (2D):  4.0 cm  LVIDs (2D):  2.8 cm  LV Mass:     129 g  59.1 g/m²     MVE Vmax:    0.59 m/s  MV A Vmax:    0.85 m/s  MV E/A:       0.69  e' lateral:   6.96 cm/s  e' medial:    5.98 cm/s  E/e' lateral: 8.43  E/e' medial:  9.82  E/e' Average: 9.07  MV DT:        195 msec    Aorta Measurements: (Normal range) (Indexed to BSA)     Sinuses of Valsalva: 3.30 cm (3.1 - 3.7 cm)       Left Atrium Measurements: (Indexed to BSA)  LA Diam 2D: 3.30 cm       LVOT / RVOT/ Qp/Qs Data: (Indexed to BSA)  LVOT Diameter: 1.80 cm  LVOT Area:     2.54 cm²    Mitral Valve Measurements:     MV E Vmax: 0.6 m/s  MV A Vmax: 0.8 m/s  MV E/A:    0.7       Tricuspid Valve Measurements:     TR Vmax:          2.1 m/s  TR Peak Gradient: 18.1 mmHg  RA Pressure:      8 mmHg  PASP:             26 mmHg    ________________________________________________________________________________________  Electronically signed on 4/26/2024 at 4:46:45 PM by Cookie Ordaz MD         *** Final ***

## 2024-05-10 NOTE — PROGRESS NOTE ADULT - SUBJECTIVE AND OBJECTIVE BOX
Patient is a 83y old  Male who presents with a chief complaint of AFib w/ RVR (07 May 2024 08:54)  Patient seen in follow up for hypernatremia, MERRILL.        PAST MEDICAL HISTORY:  Diabetes Mellitus, Type II    CAD (Coronary Artery Disease)    Dyslipidemia    Asymptomatic Peripheral Vascular Disease    Osteomyelitis    COPD (chronic obstructive pulmonary disease)    Diabetes mellitus    Hypertension    PVD (peripheral vascular disease)    History of PAT (paroxysmal atrial tachycardia)    Asthma with COPD    BPH (benign prostatic hyperplasia)    Acute osteomyelitis      MEDICATIONS  (STANDING):  albuterol/ipratropium for Nebulization 3 milliLiter(s) Nebulizer every 6 hours  aMIOdarone    Tablet 400  Oral   aMIOdarone    Tablet 200 milliGRAM(s) Oral daily  apixaban 2.5 milliGRAM(s) Oral every 12 hours  aspirin  chewable 81 milliGRAM(s) Enteral Tube daily  atorvastatin 80 milliGRAM(s) Oral at bedtime  buDESOnide    Inhalation Suspension 0.5 milliGRAM(s) Inhalation two times a day  ciprofloxacin     Tablet 500 milliGRAM(s) Oral every 12 hours  dextrose 10% Bolus 125 milliLiter(s) IV Bolus once  dextrose 5%. 1000 milliLiter(s) (100 mL/Hr) IV Continuous <Continuous>  dextrose 5%. 1000 milliLiter(s) (50 mL/Hr) IV Continuous <Continuous>  dextrose 50% Injectable 25 Gram(s) IV Push once  dextrose 50% Injectable 12.5 Gram(s) IV Push once  glucagon  Injectable 1 milliGRAM(s) IntraMuscular once  insulin glargine Injectable (LANTUS) 15 Unit(s) SubCutaneous at bedtime  insulin lispro (ADMELOG) corrective regimen sliding scale   SubCutaneous three times a day before meals  insulin lispro (ADMELOG) corrective regimen sliding scale   SubCutaneous at bedtime  metoprolol tartrate 25 milliGRAM(s) Oral two times a day  montelukast 10 milliGRAM(s) Oral daily  nystatin    Suspension 065619 Unit(s) Oral three times a day    MEDICATIONS  (PRN):  acetaminophen   Oral Liquid .. 650 milliGRAM(s) Oral every 6 hours PRN Temp greater or equal to 38.5C (101.3F)  dextrose Oral Gel 15 Gram(s) Oral once PRN Blood Glucose LESS THAN 70 milliGRAM(s)/deciliter    T(C): 36.6 (05-10-24 @ 12:08), Max: 36.7 (05-09-24 @ 21:19)  HR: 90 (05-10-24 @ 13:30) (78 - 99)  BP: 126/75 (05-10-24 @ 12:08) (115/76 - 154/73)  RR: 18 (05-10-24 @ 12:08)  SpO2: 95% (05-10-24 @ 13:30)  Wt(kg): --  I&O's Detail    09 May 2024 07:01  -  10 May 2024 07:00  --------------------------------------------------------  IN:  Total IN: 0 mL    OUT:    Incontinent per Condom Catheter (mL): 900 mL  Total OUT: 900 mL    Total NET: -900 mL                      PHYSICAL EXAM:  General: No distress  Respiratory: b/l air entry  Cardiovascular: S1 S2  Gastrointestinal: soft  Extremities:  + edema                       LABORATORY:                        12.0   7.82  )-----------( 333      ( 10 May 2024 11:20 )             39.0     05-10    143  |  108  |  24<H>  ----------------------------<  203<H>  4.1   |  31  |  1.60<H>    Ca    9.6      10 May 2024 13:30  Phos  3.3     05-10  Mg     2.2     05-10    TPro  6.2  /  Alb  2.6<L>  /  TBili  0.5  /  DBili  x   /  AST  28  /  ALT  23  /  AlkPhos  97  05-09    Sodium: 143 mmol/L (05-10 @ 13:30)  Sodium: 142 mmol/L (05-09 @ 09:12)    Potassium: 4.1 mmol/L (05-10 @ 13:30)  Potassium: 3.8 mmol/L (05-09 @ 09:12)    Hemoglobin: 12.0 g/dL (05-10 @ 11:20)  Hemoglobin: 12.2 g/dL (05-09 @ 09:12)  Hemoglobin: 12.3 g/dL (05-08 @ 07:40)    Creatinine, Serum 1.60 (05-10 @ 13:30)  Creatinine, Serum 1.50 (05-09 @ 09:12)  Creatinine, Serum 1.50 (05-08 @ 07:40)        LIVER FUNCTIONS - ( 09 May 2024 09:12 )  Alb: 2.6 g/dL / Pro: 6.2 g/dL / ALK PHOS: 97 U/L / ALT: 23 U/L / AST: 28 U/L / GGT: x           Urinalysis Basic - ( 10 May 2024 13:30 )    Color: x / Appearance: x / SG: x / pH: x  Gluc: 203 mg/dL / Ketone: x  / Bili: x / Urobili: x   Blood: x / Protein: x / Nitrite: x   Leuk Esterase: x / RBC: x / WBC x   Sq Epi: x / Non Sq Epi: x / Bacteria: x

## 2024-05-10 NOTE — PROGRESS NOTE ADULT - SUBJECTIVE AND OBJECTIVE BOX
Patient is a 83y old  Male who presents with a chief complaint of AFib w/ RVR (10 May 2024 05:56)      INTERVAL HPI/OVERNIGHT EVENTS: Patient seen and examined at bedside. No overnight events occurred. Pt awake and alert today, still unable to articulate words. Is nodding and attempting to speak. Pt is following commands today, appears much less tired. Overall, neurologic status appears much improved compared to prior few days.    MEDICATIONS  (STANDING):  albuterol/ipratropium for Nebulization 3 milliLiter(s) Nebulizer every 6 hours  aMIOdarone    Tablet 400  Oral   aMIOdarone    Tablet 200 milliGRAM(s) Oral daily  apixaban 2.5 milliGRAM(s) Oral every 12 hours  aspirin  chewable 81 milliGRAM(s) Enteral Tube daily  atorvastatin 80 milliGRAM(s) Oral at bedtime  buDESOnide    Inhalation Suspension 0.5 milliGRAM(s) Inhalation two times a day  ciprofloxacin     Tablet 500 milliGRAM(s) Oral every 12 hours  dextrose 10% Bolus 125 milliLiter(s) IV Bolus once  dextrose 5%. 1000 milliLiter(s) (100 mL/Hr) IV Continuous <Continuous>  dextrose 5%. 1000 milliLiter(s) (50 mL/Hr) IV Continuous <Continuous>  dextrose 50% Injectable 25 Gram(s) IV Push once  dextrose 50% Injectable 12.5 Gram(s) IV Push once  glucagon  Injectable 1 milliGRAM(s) IntraMuscular once  insulin glargine Injectable (LANTUS) 15 Unit(s) SubCutaneous at bedtime  insulin lispro (ADMELOG) corrective regimen sliding scale   SubCutaneous three times a day before meals  insulin lispro (ADMELOG) corrective regimen sliding scale   SubCutaneous at bedtime  metoprolol tartrate 25 milliGRAM(s) Oral two times a day  montelukast 10 milliGRAM(s) Oral daily  nystatin    Suspension 025801 Unit(s) Oral three times a day    MEDICATIONS  (PRN):  acetaminophen   Oral Liquid .. 650 milliGRAM(s) Oral every 6 hours PRN Temp greater or equal to 38.5C (101.3F)  dextrose Oral Gel 15 Gram(s) Oral once PRN Blood Glucose LESS THAN 70 milliGRAM(s)/deciliter      Allergies    No Known Allergies    Intolerances        REVIEW OF SYSTEMS:  CONSTITUTIONAL: No fever or chills  HEENT:  No headache, no sore throat  RESPIRATORY: No cough, wheezing, or shortness of breath  CARDIOVASCULAR: No chest pain, palpitations  GASTROINTESTINAL: No abd pain, nausea, vomiting, or diarrhea  GENITOURINARY: No dysuria, frequency, or hematuria  NEUROLOGICAL: no focal weakness or dizziness  MUSCULOSKELETAL: no myalgias     Vital Signs Last 24 Hrs  T(C): 36.7 (10 May 2024 05:22), Max: 36.7 (09 May 2024 21:19)  T(F): 98 (10 May 2024 05:22), Max: 98 (09 May 2024 21:19)  HR: 85 (10 May 2024 07:35) (78 - 95)  BP: 118/83 (10 May 2024 05:22) (115/76 - 154/73)  BP(mean): --  RR: 18 (10 May 2024 05:22) (17 - 18)  SpO2: 99% (10 May 2024 07:35) (94% - 99%)    Parameters below as of 10 May 2024 07:35  Patient On (Oxygen Delivery Method): nasal cannula        PHYSICAL EXAM:  GENERAL: NAD, sitting up in bed  HEENT:  +thrush. anicteric, moist mucous membranes  CHEST/LUNG:  +diffuse expiratory wheezing. No crackles or rhonchi  HEART:  RRR, S1, S2. No murmur  ABDOMEN:  BS+, soft, ND  EXTREMITIES: no edema  NERVOUS SYSTEM: +aphasia, nods to answer questions. Following commands. +RUE hemiparesis, able to wiggle toes in RLE. Full strength in b/l LUE and LLE    LABS:    CBC Full  -  ( 09 May 2024 09:12 )  WBC Count : 7.90 K/uL  Hemoglobin : 12.2 g/dL  Hematocrit : 39.3 %  Platelet Count - Automated : 302 K/uL  Mean Cell Volume : 86.4 fl  Mean Cell Hemoglobin : 26.8 pg  Mean Cell Hemoglobin Concentration : 31.0 gm/dL  Auto Neutrophil # : x  Auto Lymphocyte # : x  Auto Monocyte # : x  Auto Eosinophil # : x  Auto Basophil # : x  Auto Neutrophil % : x  Auto Lymphocyte % : x  Auto Monocyte % : x  Auto Eosinophil % : x  Auto Basophil % : x      Ca    9.9        09 May 2024 09:12        Urinalysis Basic - ( 09 May 2024 09:12 )    Color: x / Appearance: x / SG: x / pH: x  Gluc: 134 mg/dL / Ketone: x  / Bili: x / Urobili: x   Blood: x / Protein: x / Nitrite: x   Leuk Esterase: x / RBC: x / WBC x   Sq Epi: x / Non Sq Epi: x / Bacteria: x      CAPILLARY BLOOD GLUCOSE      POCT Blood Glucose.: 126 mg/dL (10 May 2024 08:27)  POCT Blood Glucose.: 163 mg/dL (09 May 2024 21:10)  POCT Blood Glucose.: 145 mg/dL (09 May 2024 17:53)  POCT Blood Glucose.: 200 mg/dL (09 May 2024 12:17)          RADIOLOGY & ADDITIONAL TESTS:  CT Head No Cont (05.09.24 @ 12:03) >  IMPRESSION:  Aging infarcts. No intracranial hemorrhage. No new areas of ischemia   identified however exam is degraded by motion        Personally reviewed.     Consultant(s) Notes Reviewed:  [x] YES  [ ] NO

## 2024-05-10 NOTE — PROGRESS NOTE ADULT - PROBLEM SELECTOR PLAN 1
MRI Head: Posterior left MCA vascular ischemic stroke. No hemorrhagic conversion, repeat CTs w/ no acute change  - Most likely cardio-embolic, c/w eliquis  - Repeat non-con CTH: Stable compared to prior, no hemorrhagic conversion  - C/w ASA and lipitor qd  - Neuro checks per protocol  - Tolerating pureed diet  - Fall and aspiration precautions   - Physiatry consulted, recs appreciated MRI Head: Posterior left MCA vascular ischemic stroke. No hemorrhagic conversion, repeat CTs w/ no acute change  - Most likely cardio-embolic, c/w eliquis  - Repeat non-con CTH: Stable compared to prior, no hemorrhagic conversion  - Somnolence can be 2/2 poor PO intake  - Will track calorie count, will discuss GOC with wife and possible PEG tube  - C/w ASA and lipitor qd  - Neuro checks per protocol  - Tolerating pureed diet  - Fall and aspiration precautions   - Physiatry consulted, recs appreciated

## 2024-05-11 NOTE — PROGRESS NOTE ADULT - SUBJECTIVE AND OBJECTIVE BOX
Neurology Follow up note    YUE [id number]    HPI:  83yoM PMHx AFib on Eliquis, CAD, HTN, DM, HLD, COPD, osteomyelitis presents c/o shortness of breath, chest discomfort. Pt reports onset of rapid heart rate this morning, HR measured 160s when he checked his pulse ox. Also endorses dyspnea exacerbated by movement. Pt reports last episode of AFib in 2020, no episodes since then until today's presentation. Pt states that chest discomfort feels like chest pressure, no chest pain. Pt also reports chronic R big toe wound for the past few months, follows w/ Wound Care. States that he has increased sensitivity to R sole of foot, but denies pain, numbness/tingling. Denies fevers, chills, abdominal pain, N/V/D/C.     IN THE ED:  Temp 98  F ,  , /81  ,RR 18 , SpO2 94%  S/P PO , IV diltiazem 10mg x2, IV diltiazem gtt @ 10 mg/hr  Labs significant for BUN/Cr 25/1.6, troponin 507.9, pBNP 355  Imaging:   CXR wet read: no gross abnormalities (25 Apr 2024 12:13)      Interval History -no new events    Patient is seen, chart was reviewed and case was discussed with the treatment team.  Pt is not in any distress.   Lying on bed comfortably.     Vital Signs Last 24 Hrs  T(C): 36.4 (11 May 2024 11:12), Max: 36.8 (10 May 2024 22:00)  T(F): 97.5 (11 May 2024 11:12), Max: 98.3 (10 May 2024 22:00)  HR: 91 (11 May 2024 11:12) (68 - 94)  BP: 117/67 (11 May 2024 11:12) (102/71 - 121/86)  BP(mean): --  RR: 18 (11 May 2024 11:12) (18 - 18)  SpO2: 97% (11 May 2024 11:12) (95% - 97%)    Parameters below as of 11 May 2024 13:45  Patient On (Oxygen Delivery Method): nasal cannula                                        REVIEW OF SYSTEMS:    Constitutional: No fever, weight loss or fatigue  Eyes: No eye pain, visual disturbances, or discharge  ENT:  No difficulty hearing, tinnitus, vertigo; No sinus or throat pain  Neck: No pain or stiffness  Respiratory: No wheezing, chills or hemoptysis  Cardiovascular: No chest pain, palpitations, shortness of breath, dizziness  Gastrointestinal: No abdominal or epigastric pain. No nausea, vomiting or hematemesis  Genitourinary: No dysuria, frequency, hematuria or incontinence  Neurological: No headaches, numbness or tremors  Psychiatric: No depression, anxiety, mood swings or difficulty sleeping  Musculoskeletal: No joint pain or swelling; No muscle, back or extremity pain  Skin: No itching, burning, rashes or lesions   Lymph Nodes: No enlarged glands  Endocrine: No heat or cold intolerance; No hair loss,   Allergy and Immunologic: No hives or eczema    On Neurological Examination:    Mental Status - Pt is alert, awake, oriented X3.. Follows commands well and able to answer questions appropriately.Mood and affect  normal    Speech -  MIXED APHASIA    Cranial Nerves - Pupils 3 mm equal and reactive to light, extraocular eye movements intact. Pt has no visual field deficit.  Pt has right facial weakness  . Facial sensation is intact.Tongue - is in midline.    Muscle tone - is decreased on right  .      Motor Exam -right hemiparesis; RUE 1/5; LE 1-2/5   No shaking or tremors.    Sensory Exam -. Pt withdraws all extremities equally on stimulation. No asymmetry seen. No complaints of tingling, numbness.        coordination:    Finger to nose: normal-left        Deep tendon Reflexes - 2 plus all over.            Neck Supple -  Yes.      MEDICATIONS  (STANDING):  albuterol/ipratropium for Nebulization 3 milliLiter(s) Nebulizer every 6 hours  aMIOdarone    Tablet 400  Oral   aMIOdarone    Tablet 200 milliGRAM(s) Oral daily  apixaban 2.5 milliGRAM(s) Oral every 12 hours  aspirin  chewable 81 milliGRAM(s) Enteral Tube daily  atorvastatin 80 milliGRAM(s) Oral at bedtime  buDESOnide    Inhalation Suspension 0.5 milliGRAM(s) Inhalation two times a day  ciprofloxacin     Tablet 500 milliGRAM(s) Oral every 12 hours  dextrose 10% Bolus 125 milliLiter(s) IV Bolus once  dextrose 5%. 1000 milliLiter(s) (100 mL/Hr) IV Continuous <Continuous>  dextrose 5%. 1000 milliLiter(s) (50 mL/Hr) IV Continuous <Continuous>  dextrose 50% Injectable 25 Gram(s) IV Push once  dextrose 50% Injectable 12.5 Gram(s) IV Push once  glucagon  Injectable 1 milliGRAM(s) IntraMuscular once  insulin glargine Injectable (LANTUS) 15 Unit(s) SubCutaneous at bedtime  insulin lispro (ADMELOG) corrective regimen sliding scale   SubCutaneous three times a day before meals  insulin lispro (ADMELOG) corrective regimen sliding scale   SubCutaneous at bedtime  metoprolol tartrate 25 milliGRAM(s) Oral two times a day  montelukast 10 milliGRAM(s) Oral daily  nystatin    Suspension 454733 Unit(s) Oral three times a day    MEDICATIONS  (PRN):  acetaminophen   Oral Liquid .. 650 milliGRAM(s) Oral every 6 hours PRN Temp greater or equal to 38.5C (101.3F)  dextrose Oral Gel 15 Gram(s) Oral once PRN Blood Glucose LESS THAN 70 milliGRAM(s)/deciliter            Allergies    No Known Allergies    Intolerances                          13.2   7.32  )-----------( 338      ( 11 May 2024 07:17 )             42.5               Hemoglobin A1C:     Vitamin B12     RADIOLOGY    ASSESSMENT AND PLAN:      Seen for right sided weakness/ams  consistent subacute left mca infarct(missed one dose of apixaban)  also multiple punctate subacute cerebellar infarcts  most likely cardio-embolic    accepted in acute rehab  aspiration precaution  ON AC and asa  continue statin  Physical therapy evaluation.  Pain is accessed and addressed.  Plan of care was discussed with family(wife_. Questions answered.  Would continue to follow.

## 2024-05-11 NOTE — PROGRESS NOTE ADULT - SUBJECTIVE AND OBJECTIVE BOX
[facility] -- Génesis Villavicencio Pannella, Patel, Savella, Goodger, Cohen  Office # [phone]    Follow Up:   CAD, AF, CVA      Subjective/Observations: seen and examined, more awake today, unable to provide meaningful information at this time on O2 via NC. no events overnight       REVIEW OF SYSTEMS: All other review of systems is negative unless indicated above  PAST MEDICAL & SURGICAL HISTORY:  Diabetes Mellitus, Type II      CAD (Coronary Artery Disease)  s/p 3v CABG 2004; stents placed in [place] in 2019      Dyslipidemia      Osteomyelitis      COPD (chronic obstructive pulmonary disease)  on 2L at home and BiPAP at night; intubated 6/18      Hypertension      PVD (peripheral vascular disease)      History of PAT (paroxysmal atrial tachycardia)      Asthma with COPD      BPH (benign prostatic hyperplasia)      Acute osteomyelitis      CABG (Coronary Artery Bypass Graft)  2004      Compound fracture  left leg      S/P primary angioplasty with coronary stent      H/O drainage of abscess  Left femur 12/2021        MEDICATIONS  (STANDING):  albuterol/ipratropium for Nebulization 3 milliLiter(s) Nebulizer every 6 hours  aMIOdarone    Tablet 400  Oral   aMIOdarone    Tablet 200 milliGRAM(s) Oral daily  apixaban 2.5 milliGRAM(s) Oral every 12 hours  aspirin  chewable 81 milliGRAM(s) Enteral Tube daily  atorvastatin 80 milliGRAM(s) Oral at bedtime  buDESOnide    Inhalation Suspension 0.5 milliGRAM(s) Inhalation two times a day  ciprofloxacin     Tablet 500 milliGRAM(s) Oral every 12 hours  dextrose 10% Bolus 125 milliLiter(s) IV Bolus once  dextrose 5%. 1000 milliLiter(s) (100 mL/Hr) IV Continuous <Continuous>  dextrose 5%. 1000 milliLiter(s) (50 mL/Hr) IV Continuous <Continuous>  dextrose 50% Injectable 25 Gram(s) IV Push once  dextrose 50% Injectable 12.5 Gram(s) IV Push once  glucagon  Injectable 1 milliGRAM(s) IntraMuscular once  insulin glargine Injectable (LANTUS) 15 Unit(s) SubCutaneous at bedtime  insulin lispro (ADMELOG) corrective regimen sliding scale   SubCutaneous three times a day before meals  insulin lispro (ADMELOG) corrective regimen sliding scale   SubCutaneous at bedtime  metoprolol tartrate 25 milliGRAM(s) Oral two times a day  montelukast 10 milliGRAM(s) Oral daily  nystatin    Suspension 938966 Unit(s) Oral three times a day    MEDICATIONS  (PRN):  acetaminophen   Oral Liquid .. 650 milliGRAM(s) Oral every 6 hours PRN Temp greater or equal to 38.5C (101.3F)  dextrose Oral Gel 15 Gram(s) Oral once PRN Blood Glucose LESS THAN 70 milliGRAM(s)/deciliter    Allergies    No Known Allergies    Intolerances      Vital Signs Last 24 Hrs  T(C): 36.6 (11 May 2024 05:13), Max: 36.8 (10 May 2024 22:00)  T(F): 97.9 (11 May 2024 05:13), Max: 98.3 (10 May 2024 22:00)  HR: 68 (11 May 2024 08:08) (68 - 94)  BP: 121/86 (11 May 2024 05:13) (102/71 - 126/75)  BP(mean): --  RR: 18 (11 May 2024 05:13) (18 - 18)  SpO2: 96% (11 May 2024 08:08) (95% - 97%)    Parameters below as of 11 May 2024 08:08  Patient On (Oxygen Delivery Method): nasal cannula, 2 L      I&O's Summary    10 May 2024 07:01  -  11 May 2024 07:00  --------------------------------------------------------  IN: 0 mL / OUT: 200 mL / NET: -200 mL            TELE: Not on telemetry   PHYSICAL EXAM:  Constitutional: NAD, awake and alert  HEENT: Moist Mucous Membranes, Anicteric  Pulmonary: Non-labored, dec breath sounds  bilaterally, + exp L>R  wheezing, rales or rhonchi  Cardiovascular: Regular, S1 and S2, No murmurs, rubs, gallops or clicks  Gastrointestinal: Bowel Sounds present, soft, nontender.   Lymph: + b/l UE edema. No lymphadenopathy.  Skin: No visible rashes or ulcers.  Psych:  Mood & affect appropriate  LABS: All Labs Reviewed:                      13.2   7.32  )-----------( 338      ( 11 May 2024 07:17 )             42.5                         12.0   7.82  )-----------( 333      ( 10 May 2024 11:20 )             39.0                         12.2   7.90  )-----------( 302      ( 09 May 2024 09:12 )             39.3     10 May 2024 13:30    143    |  108    |  24     ----------------------------<  203    4.1     |  31     |  1.60   09 May 2024 09:12    142    |  108    |  19     ----------------------------<  134    3.8     |  30     |  1.50     Ca    9.6        10 May 2024 13:30  Ca    9.9        09 May 2024 09:12  Phos  3.3       10 May 2024 13:30  Phos  3.4       09 May 2024 09:12  Mg     2.2       10 May 2024 13:30  Mg     2.0       09 May 2024 09:12    TPro  6.2    /  Alb  2.6    /  TBili  0.5    /  DBili  x      /  AST  28     /  ALT  23     /  AlkPhos  97     09 May 2024 09:12          12 Lead ECG:   Ventricular Rate 95 BPM    Atrial Rate 95 BPM    P-R Interval 136 ms    QRS Duration 86 ms    Q-T Interval 352 ms    QTC Calculation(Bazett) 442 ms    P Axis 84 degrees    R Axis 88 degrees    T Axis 58 degrees    Diagnosis Line Normal sinus rhythm  Low voltage QRS  possible BREANNA  Borderline ECG    Confirmed by Cookie Ordaz ([id number]) on 5/2/2024 2:38:25 PM (05-02-24 @ 09:49)      TRANSTHORACIC ECHOCARDIOGRAM REPORT  ________________________________________________________________________________                                      _______       Pt. Name:       YUE LAUREANO  Study Date:    4/26/2024  MRN:            [id number]            YOB: 1940  Accession #:    [id number]           Age:           83 years  Account#:       [id number]          Gender:        M  Visit ID#  Heart Rate:                         Height:        70.08 in (178.00 cm)  Rhythm:                            Weight:        220.46 lb (100.00 kg)  Blood Pressure: 158/69 mmHg         BSA/BMI:       2.18 m² / 31.56 kg/m²  ________________________________________________________________________________________  Referring Physician:   [id number] Marc Grossman  Interpreting Physician: Cookie Ordaz MD  Primary Sonographer:    Clayton Wilkinson    CPT:               ECHO TTE WO CON COMP W DOPP - 21437.m  Indication(s):     Unspecified atrial fibrillation - I48.91  Procedure:         Transthoracic echocardiogram with 2-D, M-mode and complete                     spectral and color flow Doppler.  Ordering Location: [facility]  Admission Status:  Inpatient  Study Information: Image quality for this study is technically difficult.    _______________________________________________________________________________________     CONCLUSIONS:      1. Technically difficult image quality.   2. The LV is not well visualized, however the LV function is probably normal based on limited views.   3. The right ventricle is not well visualized. probably normal systolic function.   4. There is moderate thickening of the aortic valve leaflets.   5. Structurally normal mitral valve with normal leaflet excursion.   6. Trace tricuspid regurgitation.   7. The inferior vena cava is dilated measuring 2.18 cm in diameter, (dilated >2.1cm) with normal inspiratory collapse (normal >50%) consistent with mildly elevated right atrial pressure (~8, range 5-10mmHg).   8. Estimated pulmonary artery systolic pressure is 26 mmHg, consistent with normal pulmonary artery pressure.    ________________________________________________________________________________________  FINDINGS:     Left Ventricle:  There is normal left ventricular diastolic function. Left ventricular endocardium is not well visualized.     Right Ventricle:  The right ventricle is not well visualized. Probably normal systolic function.     Left Atrium:  The left atrium is normal in size with an indexed volume of 16.30 ml/m².     Right Atrium:  The right atrium is normal in size.     Aortic Valve:  The aortic valve anatomy cannot be determined with normal systolic excursion. There is mild calcification of the aortic valve leaflets. There is moderate thickening of the aortic valve leaflets.     Mitral Valve:  Structurally normal mitral valve with normal leaflet excursion. There is trace mitral regurgitation.     Tricuspid Valve:  The tricuspid valve was not well visualized. There is trace tricuspid regurgitation. Estimated pulmonary artery systolic pressure is 26 mmHg, consistent with normal pulmonary artery pressure.     Pulmonic Valve:  The pulmonic valve was not well visualized.     Aorta:  The aortic root at the sinuses of Valsalva is normal in size, measuring 3.30 cm (indexed 1.52 cm/m²).     Systemic Veins:  The inferior vena cava is dilated measuring 2.18 cm in diameter, (dilated >2.1cm) with normal inspiratory collapse (normal >50%) consistent with mildly elevated right atrial pressure (~8, range 5-10mmHg).  ____________________________________________________________________  QUANTITATIVE DATA:  Left Ventricle Measurements: (Indexed to BSA)     IVSd (2D):   1.1 cm  LVPWd (2D):  0.9 cm  LVIDd (2D):  4.0 cm  LVIDs (2D):  2.8 cm  LV Mass:     129 g  59.1 g/m²     MVE Vmax:    0.59 m/s  MV A Vmax:    0.85 m/s  MV E/A:       0.69  e' lateral:   6.96 cm/s  e' medial:    5.98 cm/s  E/e' lateral: 8.43  E/e' medial:  9.82  E/e' Average: 9.07  MV DT:        195 msec    Aorta Measurements: (Normal range) (Indexed to BSA)     Sinuses of Valsalva: 3.30 cm (3.1 - 3.7 cm)       Left Atrium Measurements: (Indexed to BSA)  LA Diam 2D: 3.30 cm       LVOT / RVOT/ Qp/Qs Data: (Indexed to BSA)  LVOT Diameter: 1.80 cm  LVOT Area:     2.54 cm²    Mitral Valve Measurements:     MV E Vmax: 0.6 m/s  MV A Vmax: 0.8 m/s  MV E/A:    0.7       Tricuspid Valve Measurements:     TR Vmax:          2.1 m/s  TR Peak Gradient: 18.1 mmHg  RA Pressure:      8 mmHg  PASP:             26 mmHg    ________________________________________________________________________________________  Electronically signed on 4/26/2024 at 4:46:45 PM by Cookie Ordaz MD         *** Final ***

## 2024-05-11 NOTE — SWALLOW BEDSIDE ASSESSMENT ADULT - SPECIFY REASON(S)
The pt was referred for a dysphagia evaluation to assess the pt's swallow function.
to re-assess swallow function, per MD request

## 2024-05-11 NOTE — SWALLOW BEDSIDE ASSESSMENT ADULT - ADDITIONAL RECOMMENDATIONS
1. ST to f/u while the pt is in-house as schedule permits.
This department will continue to follow the patient for diet tolerance/advancement during this admission, as schedule permits. Recommend continued therapy upon discharge from [facility] (rehab vs. home care vs. outpatient).

## 2024-05-11 NOTE — CHART NOTE - NSCHARTNOTEFT_GEN_A_CORE
Assessment: patient seen for follow up and re assessment for calorie count ( calorie count not part of policy at this time) explained to MD and RN.    83y old  Male who presents with a chief complaint of AFib w/ RVR   yesterday and this AM ~ 25% of meals taken.  RD discussed with MD  patient with low PO family deciding on further nutrition support, patient previously on TF this admit , MOLST in chart DNR   discussed with MD to change diet to puree mild thick and will continue glucerna BID  will add magic cup ice cream BID (JAYSON) 260 kcal and 9 gms protein per serving  5/10 fecal incontinence         Factors impacting intake: [ ] none [ ] nausea  [ ] vomiting [ ] diarrhea [ ] constipation  [ ]chewing problems [x ] swallowing issues  [ ] other: speech recommends puree mild thick     Diet Prescription: Diet, Pureed:   Consistent Carbohydrate {Evening Snack}  Moderately Thick Liquids (MODTHICKLIQS)  Glucerna Shake Cans or Servings Per Day:  2 (05-10-24 @ 10:08)    Intake: 0-25% per flow sheet and RN    Current Weight: 5/11 202.3# dosing wt 222# + 1 right and left arm edema will follow weights      Pertinent Medications: MEDICATIONS  (STANDING):  albuterol/ipratropium for Nebulization 3 milliLiter(s) Nebulizer every 6 hours  aMIOdarone    Tablet 400  Oral   aMIOdarone    Tablet 200 milliGRAM(s) Oral daily  apixaban 2.5 milliGRAM(s) Oral every 12 hours  aspirin  chewable 81 milliGRAM(s) Enteral Tube daily  atorvastatin 80 milliGRAM(s) Oral at bedtime  buDESOnide    Inhalation Suspension 0.5 milliGRAM(s) Inhalation two times a day  ciprofloxacin     Tablet 500 milliGRAM(s) Oral every 12 hours  dextrose 10% Bolus 125 milliLiter(s) IV Bolus once  dextrose 5%. 1000 milliLiter(s) (100 mL/Hr) IV Continuous <Continuous>  dextrose 5%. 1000 milliLiter(s) (50 mL/Hr) IV Continuous <Continuous>  dextrose 50% Injectable 25 Gram(s) IV Push once  dextrose 50% Injectable 12.5 Gram(s) IV Push once  glucagon  Injectable 1 milliGRAM(s) IntraMuscular once  insulin glargine Injectable (LANTUS) 15 Unit(s) SubCutaneous at bedtime  insulin lispro (ADMELOG) corrective regimen sliding scale   SubCutaneous three times a day before meals  insulin lispro (ADMELOG) corrective regimen sliding scale   SubCutaneous at bedtime  metoprolol tartrate 25 milliGRAM(s) Oral two times a day  montelukast 10 milliGRAM(s) Oral daily  nystatin    Suspension [id number] Unit(s) Oral three times a day    MEDICATIONS  (PRN):  acetaminophen   Oral Liquid .. 650 milliGRAM(s) Oral every 6 hours PRN Temp greater or equal to 38.5C (101.3F)  dextrose Oral Gel 15 Gram(s) Oral once PRN Blood Glucose LESS THAN 70 milliGRAM(s)/deciliter    Pertinent Labs: POCT 145, 168 Creat 1.6  Skin: right toe DM ulcer    Estimated Needs:   [X] no change since previous assessment: based on #/102kg  18-22kcal/kg (1836-2244kcal)  .8-1.0g pro/kg (82-102gm protein)      Previous Nutrition Diagnosis:   [x ]swallow difficulty  [ x]Inadequate Oral Intake       Nutrition Diagnosis is [x ] ongoing  [ ] resolved [ ] not applicable     New Nutrition Diagnosis: [ ] not applicable       Interventions:   Recommend  [x ] Change Diet To: puree mild thick   [ x] Nutrition Supplement continue glucerna BID , add JAYSON magic cup BID  [ ] Nutrition Support  [x ] Other: recommend MVI, follow PO intake     Monitoring and Evaluation:   [x ] PO intake [ x ] Tolerance to diet prescription [ x ] weights [ x ] labs[ x ] follow up per protocol  [ ] other:

## 2024-05-11 NOTE — PROGRESS NOTE ADULT - ASSESSMENT
83-year-old M with history of COPD on home O2 PRN, coronary artery disease s/p CABG,  hypertension, diabetes, hyperlipidemia, atrial fibrillation on Eliquis as well as chronic osteomyelitis who presents to the emergency department at [facility] complaining of rapid heart rate. Cardiology consulted for afib with rvr.    CAD, CABG, HTN, HLD, A fib  - p/w PAF with RVR with SOB at home in ED s/p Cardizem gtt, admits to had missed home BB   - on the morning of 4/27, patient was noted to have new onset aphasia and a code stroke was called.  He was deemed not a candidate for TNK as he is on AC.  He was found to have a L M3 occlusion but was deemed not a candidate for thrombectomy as occlusion too distal.    - Later that night, a RRT was called due to worsening NIH score noted by nursing.   - Repeat CT brain demonstrated moderate-sized evolving acute/subacute L MCA territory infarct but no hemorrhagic conversion.   - neuro following   - Continue ASA and statin     - BP, HR stable and controlled per flow sheet   - continue Lopressor 25mg q12 with hold parameters    - continue amio  200mg PO daily  - Continue Eliquis 2.5  mg BID. Dose reduced in the setting of renal function and age. Previous Cr of 1.4, if his renal function goes back to baseline of 1.4 would place on full dose Eliquis for CVA protection    - Monitor and replete Lytes. Keep K > 4 and Mg > 2    - DC planning to [facility] per primary   - Will continue to follow.    Nikki Chan [facility]  Nurse Practitioner - Cardiology   call TEAMS

## 2024-05-11 NOTE — PROGRESS NOTE ADULT - SUBJECTIVE AND OBJECTIVE BOX
Physical Medicine and Rehabilitation subsequent Evaluation    The patient was seen and examined at bedside. Patients wife again at bedside. She had questions and concerns regarding prognosis and procedure of PEG tube placement. Potential complications. I discussed the use of PEG tube and its practicalities, management, a crude description of the actual procedure endoscopically, discussed renal function status, hydration, common sequelae and complications long term, whether it was an indefinite or temporary thing that he will need -- we discussed his prognosis and his motor recovery as a main determinant of this. We again discussed [facility] dispo recommendation and discussed additional questions that patients wife had. Patient himself is calm in bed without any distress noted.    ROS: Denies pain, + dysarthric speech, + weakness in RLE and RUE, R face    Medications: reviewed    Physical Exam:   Vitals: reviewed    Constitutional: Gen: In no acute distress  Neuro: R facial droop, RUE and RLE hemiplegia, LUE and LLE are at least 3/5, sensation intact in the RUE and RLE  Psychiatric: Awake

## 2024-05-11 NOTE — SWALLOW BEDSIDE ASSESSMENT ADULT - NS ASR SWALLOW FINDINGS DISCUS
ICU resident notified of the aforementioned information. Pt's RN Feli also notified./Physician
Physician/Nursing/Patient/Family

## 2024-05-11 NOTE — SWALLOW BEDSIDE ASSESSMENT ADULT - SWALLOW EVAL: DIAGNOSIS
Of note, pt was orally guarded requiring max multimodality cueing to participate in trials which proved inconsistently beneficial. As a result, puree was trialed x2 and moderately thick liquids x2. Pt declined further trials. Initially, trace amounts of puree were placed on the pt's labial surface at which time he was able to move the bolus into his oral cavity with his tongue. Posterior loss was suspected and pharyngeal swallow trigger was suspected to be delayed. Hyolaryngeal excursion/elevation was appreciated though suspected to be reduced upon palpation. No overt s/s of aspiration or penetration noted with trace amount of puree. The pt was then presented with puree x1 via 1/2 teaspoon and moderately thick liquids x2 via 1/2 teaspoon at which time the pt presented with severe oral dysphagia marked by reduced bolus stripping, partial anterior loss, mild bilateral pocketing, prolonged oral transit, and suspected posterior loss of the bolus.
1. Moderate oral dysphagia for pureed, moderately thick, mildly thick, and thin liquids marked by reduced stripping of bolus from utensil (anterior loss with thin liquids) and prolonged manipulation, collection, and transfer. Adequate clearance post swallow. 2. Mild pharyngeal dysphagia across all PO consistencies marked by suspected delayed pharyngeal swallow trigger, reduced hyolaryngeal elevation noted by digital palpation, without evidence of airway penetration/aspiration, consistent with findings of recent MBSS on 5/3, as abovementioned. 3. Recommend pureed and mildly thick liquids VIA TEASPOON PRESENTATIONS ONLY, with strict aspiration precautions and full assistance during meals. Facilitate upright positioning, slow pacing, single/small bites/sips via TSP ONLY, and alternate solids with liquids. Continue to monitor and notify SLP if changes occur.

## 2024-05-11 NOTE — SWALLOW BEDSIDE ASSESSMENT ADULT - ASR SWALLOW RECOMMEND DIAG
patient with no overt signs across all PO consistencies; consider repeat objective testing of MBSS if concerned for aspiration given history of CVA/dysphagia.
Defer at this time given pt's reduced cognition. Further, pt is orally guarded resulting in limited trials and reduced participation in today's bedside dysphagia evaluation.

## 2024-05-11 NOTE — PROGRESS NOTE ADULT - ATTENDING COMMENTS
Patient was seen and examined by myself. Case was discussed with house staff in details. I have reviewed and agree with the plan as outlined above with edits where appropriate.    HPI:  83yoM PMHx AFib on Eliquis, CAD, HTN, DM, HLD, COPD, osteomyelitis presents c/o shortness of breath, chest discomfort. Pt reports onset of rapid heart rate this morning, HR measured 160s when he checked his pulse ox. Also endorses dyspnea exacerbated by movement. Pt reports last episode of AFib in 2020, no episodes since then until today's presentation. Pt states that chest discomfort feels like chest pressure, no chest pain. Pt also reports chronic R big toe wound for the past few months, follows w/ [facility]ound [facility]. States that he has increased sensitivity to R sole of foot, but denies pain, numbness/tingling. Denies fevers, chills, abdominal pain, N/V/D/C.     IN THE ED:  Temp 98  F ,  , /81  ,RR 18 , SpO2 94%  S/P PO , IV diltiazem 10mg x2, IV diltiazem gtt @ 10 mg/hr  Labs significant for BUN/Cr 25/1.6, troponin 507.9, pBNP 355  Imaging:   CXR wet read: no gross abnormalities (25 Apr 2024 12:13)      ROS: as in the HPI; all other ROS negative    SH and family history as above    Vital Signs Last 24 Hrs, stable         GEN: NAD, appears weak, more awake and alert   HEENT- normocephalic; mouth moist, right facial drop, oral thrush   CVS- S1S2+, irregular   LUNGS- clear to auscultation; no wheezing  ABD: Soft , nontender, nondistended, Bowel sounds are present  EXTREMITY: trace Ankle swelling B/L   NEURO: AA; slurred speech, dysarthria, Right side weakness gross motor 1/5, Left  2-3/5   PSYCH: follows commend better today    BACK: no swelling or mass;   VASCULAR: distal peripheral pulses present  SKIN: warm and dry.       Labs and imaging reviewed      Patient presenting with Patient is a 83y old  Male who presents with a chief complaint of AFib w/ RVR (08 May 2024 16:50)   admitted for    A fib with RVR: now on eliquis and Amiodarone/lopressor, cardio eval noted   CVA: ASA, statin, PT/OT, BP/Afib management , repeat CT today similar finding. discussed with physiatry. acute VS subacute rehab.   dysphagia: purred, S/S re eval noted , aspiration precaution, wife states his oral intake is very limited. nutritional supplement added, 25% oral intake as per nutrition monitoring, option of Peg placement discussed if oral intake inadequate.  will have GI eval    Hypernatremia: resolved  MERRILL: BUN/Cr ,start IVF   DM : lantus with RISS , FS goal 100-180    oral thrush: nystatin S/S         Plan of care discussed with patient and wife at bedside  ;  all questions and concerns were addressed. Patient was seen and examined by myself. Case was discussed with house staff in details. I have reviewed and agree with the plan as outlined above with edits where appropriate.    HPI:  83yoM PMHx AFib on Eliquis, CAD, HTN, DM, HLD, COPD, osteomyelitis presents c/o shortness of breath, chest discomfort. Pt reports onset of rapid heart rate this morning, HR measured 160s when he checked his pulse ox. Also endorses dyspnea exacerbated by movement. Pt reports last episode of AFib in 2020, no episodes since then until today's presentation. Pt states that chest discomfort feels like chest pressure, no chest pain. Pt also reports chronic R big toe wound for the past few months, follows w/ Wound Care. States that he has increased sensitivity to R sole of foot, but denies pain, numbness/tingling. Denies fevers, chills, abdominal pain, N/V/D/C.     IN THE ED:  Temp 98  F ,  , /81  ,RR 18 , SpO2 94%  S/P PO , IV diltiazem 10mg x2, IV diltiazem gtt @ 10 mg/hr  Labs significant for BUN/Cr 25/1.6, troponin 507.9, pBNP 355  Imaging:   CXR wet read: no gross abnormalities (25 Apr 2024 12:13)      ROS: as in the HPI; all other ROS negative    SH and family history as above    Vital Signs Last 24 Hrs, stable         GEN: NAD, appears weak, more awake and alert   HEENT- normocephalic; mouth moist, right facial drop, oral thrush   CVS- S1S2+, irregular   LUNGS- clear to auscultation; no wheezing  ABD: Soft , nontender, nondistended, Bowel sounds are present  EXTREMITY: trace Ankle swelling B/L   NEURO: AA; slurred speech, dysarthria, Right side weakness gross motor 1/5, Left  2-3/5   PSYCH: follows commend better today    BACK: no swelling or mass;   VASCULAR: distal peripheral pulses present  SKIN: warm and dry.       Labs and imaging reviewed      Patient presenting with Patient is a 83y old  Male who presents with a chief complaint of AFib w/ RVR (08 May 2024 16:50)   admitted for    A fib with RVR: now on eliquis and Amiodarone/lopressor, cardio eval noted   CVA: ASA, statin, PT/OT, BP/Afib management , repeat CT today similar finding. discussed with physiatry. acute VS subacute rehab.   dysphagia: purred, S/S re eval noted , aspiration precaution, wife states his oral intake is very limited. nutritional supplement added, 25% oral intake as per nutrition monitoring, option of Peg placement discussed if oral intake inadequate.  will have GI eval    Hypernatremia: resolved  MERRILL: BUN/Cr level elevated but stable , will monitor.    DM : lantus with RISS , FS goal 100-180    oral thrush: nystatin S/S         Plan of care discussed with patient and wife at bedside  ;  all questions and concerns were addressed.

## 2024-05-11 NOTE — PROGRESS NOTE ADULT - SUBJECTIVE AND OBJECTIVE BOX
Patient is a 83y old  Male who presents with a chief complaint of AFib w/ RVR (11 May 2024 09:51)      INTERVAL HPI/OVERNIGHT EVENTS: No acute events overnight. Patient seen and examined at bedside this AM. RN noted patient had course breath sounds prior to duoneb treatment this AM. Patient appears to be breathing comfortably, NAD. Remains able to nod in response to questions, but cannot verbalize.     MEDICATIONS  (STANDING):  albuterol/ipratropium for Nebulization 3 milliLiter(s) Nebulizer every 6 hours  aMIOdarone    Tablet 400  Oral   aMIOdarone    Tablet 200 milliGRAM(s) Oral daily  aspirin  chewable 81 milliGRAM(s) Enteral Tube daily  atorvastatin 80 milliGRAM(s) Oral at bedtime  buDESOnide    Inhalation Suspension 0.5 milliGRAM(s) Inhalation two times a day  ciprofloxacin     Tablet 500 milliGRAM(s) Oral every 12 hours  dextrose 10% Bolus 125 milliLiter(s) IV Bolus once  dextrose 5%. 1000 milliLiter(s) (100 mL/Hr) IV Continuous <Continuous>  dextrose 5%. 1000 milliLiter(s) (50 mL/Hr) IV Continuous <Continuous>  dextrose 50% Injectable 25 Gram(s) IV Push once  dextrose 50% Injectable 12.5 Gram(s) IV Push once  enoxaparin Injectable 90 milliGRAM(s) SubCutaneous every 12 hours  glucagon  Injectable 1 milliGRAM(s) IntraMuscular once  insulin glargine Injectable (LANTUS) 15 Unit(s) SubCutaneous at bedtime  insulin lispro (ADMELOG) corrective regimen sliding scale   SubCutaneous three times a day before meals  insulin lispro (ADMELOG) corrective regimen sliding scale   SubCutaneous at bedtime  metoprolol tartrate 25 milliGRAM(s) Oral two times a day  montelukast 10 milliGRAM(s) Oral daily  nystatin    Suspension 630139 Unit(s) Oral three times a day    MEDICATIONS  (PRN):  acetaminophen   Oral Liquid .. 650 milliGRAM(s) Oral every 6 hours PRN Temp greater or equal to 38.5C (101.3F)  dextrose Oral Gel 15 Gram(s) Oral once PRN Blood Glucose LESS THAN 70 milliGRAM(s)/deciliter      Allergies    No Known Allergies    Intolerances        REVIEW OF SYSTEMS: (Limited 2/2 patient's mental status)  CONSTITUTIONAL: No fever or chills  HEENT:  No headache, no sore throat  RESPIRATORY: No cough, wheezing, or shortness of breath  CARDIOVASCULAR: No chest pain, palpitations  GASTROINTESTINAL: No abd pain, nausea, vomiting, or diarrhea  GENITOURINARY: No dysuria, frequency, or hematuria  NEUROLOGICAL: no focal weakness or dizziness  MUSCULOSKELETAL: no myalgias     Vital Signs Last 24 Hrs  T(C): 36.4 (11 May 2024 11:12), Max: 36.8 (10 May 2024 22:00)  T(F): 97.5 (11 May 2024 11:12), Max: 98.3 (10 May 2024 22:00)  HR: 91 (11 May 2024 11:12) (68 - 94)  BP: 117/67 (11 May 2024 11:12) (102/71 - 121/86)  BP(mean): --  RR: 18 (11 May 2024 11:12) (18 - 18)  SpO2: 97% (11 May 2024 11:12) (95% - 97%)    Parameters below as of 11 May 2024 11:12  Patient On (Oxygen Delivery Method): nasal cannula  O2 Flow (L/min): 2      PHYSICAL EXAM:  GENERAL: NAD, sitting up in bed  HEENT:  +thrush. anicteric, moist mucous membranes  CHEST/LUNG:  +diffuse expiratory wheezing. No crackles or rhonchi  HEART:  RRR, S1, S2. No murmur  ABDOMEN:  BS+, soft, ND  EXTREMITIES: no edema  NERVOUS SYSTEM: +aphasia, nods to answer questions. Following commands. +RUE hemiparesis, able to wiggle toes in RLE. Full strength in b/l LUE and LLE      LABS:                        13.2   7.32  )-----------( 338      ( 11 May 2024 07:17 )             42.5     CBC Full  -  ( 11 May 2024 07:17 )  WBC Count : 7.32 K/uL  Hemoglobin : 13.2 g/dL  Hematocrit : 42.5 %  Platelet Count - Automated : 338 K/uL  Mean Cell Volume : 87.1 fl  Mean Cell Hemoglobin : 27.0 pg  Mean Cell Hemoglobin Concentration : 31.1 gm/dL  Auto Neutrophil # : x  Auto Lymphocyte # : x  Auto Monocyte # : x  Auto Eosinophil # : x  Auto Basophil # : x  Auto Neutrophil % : x  Auto Lymphocyte % : x  Auto Monocyte % : x  Auto Eosinophil % : x  Auto Basophil % : x    11 May 2024 10:06    145    |  110    |  24     ----------------------------<  203    3.9     |  31     |  1.60     Ca    9.6        11 May 2024 10:06  Phos  2.8       11 May 2024 10:06  Mg     2.2       11 May 2024 10:06    TPro  6.8    /  Alb  2.7    /  TBili  0.7    /  DBili  x      /  AST  28     /  ALT  24     /  AlkPhos  100    11 May 2024 10:06      Urinalysis Basic - ( 11 May 2024 10:06 )    Color: x / Appearance: x / SG: x / pH: x  Gluc: 203 mg/dL / Ketone: x  / Bili: x / Urobili: x   Blood: x / Protein: x / Nitrite: x   Leuk Esterase: x / RBC: x / WBC x   Sq Epi: x / Non Sq Epi: x / Bacteria: x      CAPILLARY BLOOD GLUCOSE      POCT Blood Glucose.: 182 mg/dL (11 May 2024 12:13)  POCT Blood Glucose.: 145 mg/dL (11 May 2024 08:02)  POCT Blood Glucose.: 169 mg/dL (10 May 2024 21:31)  POCT Blood Glucose.: 174 mg/dL (10 May 2024 17:02)          RADIOLOGY & ADDITIONAL TESTS:    Personally reviewed.     Consultant(s) Notes Reviewed:  [x] YES  [ ] NO

## 2024-05-11 NOTE — PROGRESS NOTE ADULT - SUBJECTIVE AND OBJECTIVE BOX
Date/Time Patient Seen:  		  Referring MD:   Data Reviewed	       Patient is a 83y old  Male who presents with a chief complaint of AFib w/ RVR (10 May 2024 17:33)      Subjective/HPI     PAST MEDICAL & SURGICAL HISTORY:  Diabetes Mellitus, Type II    CAD (Coronary Artery Disease)  s/p 3v CABG 2004; stents placed in [place] in 2019    Dyslipidemia    Asymptomatic Peripheral Vascular Disease    Osteomyelitis    COPD (chronic obstructive pulmonary disease)  on 2L at home and BiPAP at night; intubated 6/18    Diabetes mellitus    Hypertension    PVD (peripheral vascular disease)    History of PAT (paroxysmal atrial tachycardia)    Asthma with COPD    BPH (benign prostatic hyperplasia)    Acute osteomyelitis    CABG (Coronary Artery Bypass Graft)  2004    Compound fracture  left leg    S/P primary angioplasty with coronary stent    H/O drainage of abscess  Left femur 12/2021          Medication list         MEDICATIONS  (STANDING):  albuterol/ipratropium for Nebulization 3 milliLiter(s) Nebulizer every 6 hours  aMIOdarone    Tablet 400  Oral   aMIOdarone    Tablet 200 milliGRAM(s) Oral daily  apixaban 2.5 milliGRAM(s) Oral every 12 hours  aspirin  chewable 81 milliGRAM(s) Enteral Tube daily  atorvastatin 80 milliGRAM(s) Oral at bedtime  buDESOnide    Inhalation Suspension 0.5 milliGRAM(s) Inhalation two times a day  ciprofloxacin     Tablet 500 milliGRAM(s) Oral every 12 hours  dextrose 10% Bolus 125 milliLiter(s) IV Bolus once  dextrose 5%. 1000 milliLiter(s) (100 mL/Hr) IV Continuous <Continuous>  dextrose 5%. 1000 milliLiter(s) (50 mL/Hr) IV Continuous <Continuous>  dextrose 50% Injectable 25 Gram(s) IV Push once  dextrose 50% Injectable 12.5 Gram(s) IV Push once  glucagon  Injectable 1 milliGRAM(s) IntraMuscular once  insulin glargine Injectable (LANTUS) 15 Unit(s) SubCutaneous at bedtime  insulin lispro (ADMELOG) corrective regimen sliding scale   SubCutaneous three times a day before meals  insulin lispro (ADMELOG) corrective regimen sliding scale   SubCutaneous at bedtime  metoprolol tartrate 25 milliGRAM(s) Oral two times a day  montelukast 10 milliGRAM(s) Oral daily  nystatin    Suspension [id number] Unit(s) Oral three times a day    MEDICATIONS  (PRN):  acetaminophen   Oral Liquid .. 650 milliGRAM(s) Oral every 6 hours PRN Temp greater or equal to 38.5C (101.3F)  dextrose Oral Gel 15 Gram(s) Oral once PRN Blood Glucose LESS THAN 70 milliGRAM(s)/deciliter         Vitals log        ICU Vital Signs Last 24 Hrs  T(C): 36.6 (11 May 2024 05:13), Max: 36.8 (10 May 2024 22:00)  T(F): 97.9 (11 May 2024 05:13), Max: 98.3 (10 May 2024 22:00)  HR: 93 (11 May 2024 05:13) (85 - 94)  BP: 121/86 (11 May 2024 05:13) (102/71 - 126/75)  BP(mean): --  ABP: --  ABP(mean): --  RR: 18 (11 May 2024 05:13) (18 - 18)  SpO2: 95% (11 May 2024 01:16) (95% - 99%)    O2 Parameters below as of 11 May 2024 05:13  Patient On (Oxygen Delivery Method): nasal cannula  O2 Flow (L/min): 2               Input and Output:  I&O's Detail    09 May 2024 07:01  -  10 May 2024 07:00  --------------------------------------------------------  IN:  Total IN: 0 mL    OUT:    Incontinent per Condom Catheter (mL): 900 mL  Total OUT: 900 mL    Total NET: -900 mL      10 May 2024 07:01  -  11 May 2024 05:48  --------------------------------------------------------  IN:  Total IN: 0 mL    OUT:    Incontinent per Condom Catheter (mL): 200 mL  Total OUT: 200 mL    Total NET: -200 mL          Lab Data                        12.0   7.82  )-----------( 333      ( 10 May 2024 11:20 )             39.0     05-10    143  |  108  |  24<H>  ----------------------------<  203<H>  4.1   |  31  |  1.60<H>    Ca    9.6      10 May 2024 13:30  Phos  3.3     05-10  Mg     2.2     05-10    TPro  6.2  /  Alb  2.6<L>  /  TBili  0.5  /  DBili  x   /  AST  28  /  ALT  23  /  AlkPhos  97  05-09            Review of Systems	      Objective     Physical Examination  heart s1s2  lung dc BS  head nc        Pertinent Lab findings & Imaging      Eliecer:  NO   Adequate UO     I&O's Detail    09 May 2024 07:01  -  10 May 2024 07:00  --------------------------------------------------------  IN:  Total IN: 0 mL    OUT:    Incontinent per Condom Catheter (mL): 900 mL  Total OUT: 900 mL    Total NET: -900 mL      10 May 2024 07:01  -  11 May 2024 05:48  --------------------------------------------------------  IN:  Total IN: 0 mL    OUT:    Incontinent per Condom Catheter (mL): 200 mL  Total OUT: 200 mL    Total NET: -200 mL               Discussed with:     Cultures:	        Radiology

## 2024-05-11 NOTE — SWALLOW BEDSIDE ASSESSMENT ADULT - ASR SWALLOW REFERRAL
Continue RD services to ensure adequate daily caloric nutritional intake given reports of poor PO intake/Registered Dietitian

## 2024-05-11 NOTE — PROGRESS NOTE ADULT - PROBLEM SELECTOR PLAN 1
MRI Head: Posterior left MCA vascular ischemic stroke. No hemorrhagic conversion, repeat CTs w/ no acute change  - Most likely cardio-embolic, c/w eliquis  - Repeat non-con CTH: Stable compared to prior, no hemorrhagic conversion  - Somnolence can be 2/2 poor PO intake  - Will track calorie count as weight loss noted, will discuss GOC with wife and possible PEG tube  - Dysphagia noted by RN. Re-consult Speech/Swallow   - GI consulted for PEG tube consideration   - C/w ASA and lipitor qd  - Neuro checks per protocol  - Fall and aspiration precautions   - Physiatry consulted, recs appreciated

## 2024-05-11 NOTE — SWALLOW BEDSIDE ASSESSMENT ADULT - SWALLOW EVAL: RECOMMENDED DIET
1. NPO as PO is contraindicated at this time 2. Considerations for short-term alternate means of nutrition/hydration
pureed and mildly thick liquids VIA TEASPOON PRESENTATIONS ONLY, with strict aspiration precautions and full assistance during meals.

## 2024-05-11 NOTE — SWALLOW BEDSIDE ASSESSMENT ADULT - ORAL PHASE
Decreased anterior-posterior movement of the bolus/Delayed oral transit time
bilateral buccal pocketing, suspected posterior loss/Delayed oral transit time

## 2024-05-11 NOTE — PROGRESS NOTE ADULT - ASSESSMENT
A/P 84y year old Male with CVA, R hemiplegia, ambulatory dysfunction, muscle weakness, dysphagia, aphasia, dysarthria    Discussed with patient PEG tube placement and use, practicalities and implications as per HPI.  Remains a good candidate for SUSI -- no meaningful progression and significant functional deficits. Doubt his ability to perform 3 hours therapy daily despite qualifying dx of CVA, and doubtful of discharge to home/community -- will likely need SUSI given magnitude of deficits.    Primary team notes are reviewed  Therapy notes are reviewed  Discussed management/coordinated care with primary team/referring provider.    56 minutes spent during patient encounter:    ¦ preparing to see the patient   ¦ performing a medically appropriate examination and/or evaluation   ¦ referring and communicating with other health care professionals  ¦ documenting clinical information in the electronic or other health record   ¦ care coordination

## 2024-05-11 NOTE — SWALLOW BEDSIDE ASSESSMENT ADULT - PHARYNGEAL PHASE
Delayed throat clear/Delayed pharyngeal swallow/Decreased laryngeal elevation
Delayed pharyngeal swallow/Decreased laryngeal elevation

## 2024-05-11 NOTE — SWALLOW BEDSIDE ASSESSMENT ADULT - COMMENTS
MD contacted this department regarding patient not tolerating puree diet and with difficulty with secretion management, despite NO penetration/aspiration found on MBS completed this AM. SLP provided recommendations to MD to initiate NPO with short term non oral means of nutrition hydration per patient/family wishes. Recommend consider nutritional Goals of care discussion (i.e., pleasure feeds vs non-oral means). This department to follow up as schedule permits. Please reconsult this service as needed. Consider repeat MBS when pt is medically optimized.
Consult received and chart reviewed. Patient was seen for re-assessment of swallow function this AM, as per MD request. Patient is familiar to this department and was seen for an MBS on 5/3/24 at which time patient with no penetration/aspiration and with recommendations for pureed and mildly thick liquids via teaspoon only (please see full report for details). Patient is currently on a pureed and moderately thick liquid diet level, as per MD order. Per RN report, patient noted with coughing after meals. Patient's spouse at bedside reported feeding patient applesauce this AM without coughing noted. Patient was alert, minimally verbal, and did not follow commands despite max cues however denied pain via head nod and was receptive to all PO trials for assessment. Patient on supplemental O2 via NC, 2L. Vocal quality WFL.     Per charting, the patient is a "83yoM PMHx AFib on Eliquis, CAD, HTN, DM, HLD, COPD, osteomyelitis presents c/o shortness of breath, chest discomfort. Admitted for AFib w/ RVR. Hospital course c/b CVA on 4/27/2024. 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 "    WBC WFL.  CTH 5/9/24: "Aging infarcts. No intracranial hemorrhage. No new areas of ischemia identified however exam is degraded by motion"  CXR 5/3/24: "NG tube tip coiled in gastric fundus. No radiographic evidence of active chest disease.."    Discussed results and recommendations with the patient, RN, and called out to MD.
"83yoM PMHx AFib on Eliquis, CAD, HTN, DM, HLD, COPD, osteomyelitis presents c/o shortness of breath, chest discomfort. Pt reports onset of rapid heart rate this morning, HR measured 160s when he checked his pulse ox. Also endorses dyspnea exacerbated by movement. Pt reports last episode of AFib in 2020, no episodes since then until today's presentation. Pt states that chest discomfort feels like chest pressure, no chest pain. Pt also reports chronic R big toe wound for the past few months, follows w/ Wound Care. States that he has increased sensitivity to R sole of foot, but denies pain, numbness/tingling. Denies fevers, chills, abdominal pain, N/V/D/C. "     MR Head 4/27/24: "IMPRESSION:  1.  Multiple acute infarcts scattered throughout the bilateral cerebral   and cerebellar hemispheres. Given multiple vascular distributions   involved, an embolic source should be considered. 2.  More dominant acute to subacute infarction in the posterior left MCA   vascular distribution. 3.  Chronic small vessel disease."     X-ray Chest 4/27/24:"IMPRESSION:    Moderate centrilobular emphysema, better characterized on CT, grossly   unchanged"     Per charting, the pt received a MBS on 3/21/23 at which time a soft and bite sized diet with thin liquids was recommended. Please refer to full report for further information.

## 2024-05-11 NOTE — SWALLOW BEDSIDE ASSESSMENT ADULT - SWALLOW EVAL: PROGNOSIS
Pharyngeal dysphagia suspected for puree and moderately thick liquids marked by suspected delayed pharyngeal swallow trigger, (+) though suspected to be reduced hyolaryngeal excursion/elevation upon palpation, and delayed throat clear. Following trials, oral care was provided which effectively removed any remaining residue. Pt's oral cavity was clear.
fair; continue to monitor closely

## 2024-05-11 NOTE — PROGRESS NOTE ADULT - ASSESSMENT
83-year-old  black male with history of COPD coronary artery disease hypertension diabetes hyperlipidemia atrial fibrillation on Eliquis as well as osteomyelitis who presents now to the emergency department at [facility] complaining of rapid heart rate    jacy  copd  AF  OP  OA  CAD  HTN  DM  HLD  OM hx  CVA     Pureed diet  on cipro - suppression ABX  s/p Nucala for Asthma - as per home rx regimen  s/p CVA tx - CT head repeat noted -   GOC documented - DNR DNI    follows with Dr Ryland ashley med  uses NIV - BIPAP night time for JACY - sleep hygiene reviewed  cardio eval - [facility] rx regimen  BP control  DM care  AF rate and rhythm control  TFT  outpatient record reviewed  proventil PRN - Singulair - copd  spoke with WIFE  wound care

## 2024-05-12 NOTE — PROGRESS NOTE ADULT - PROBLEM SELECTOR PLAN 10
- Eliquis BID    #GOC:  - DNR/DNI  - GI consulted for PEG tube consideration    #Dispo  - SW following, pending [facility] acceptances

## 2024-05-12 NOTE — PROGRESS NOTE ADULT - SUBJECTIVE AND OBJECTIVE BOX
[facility] -- Génesis Villavicencio Pannella, Patel, Carolina Heath Cohen  Office # [phone]    Follow Up:  CAD, AF, CVA    Subjective/Observations: seen and examined, more awake, alert, eating breakfast wife at bedside, NAD, unable to provide meaningful information at this time on O2 via NC. no events overnight     REVIEW OF SYSTEMS: All other review of systems is negative unless indicated above  PAST MEDICAL & SURGICAL HISTORY:  Diabetes Mellitus, Type II      CAD (Coronary Artery Disease)  s/p 3v CABG 2004; stents placed in [place] in 2019      Dyslipidemia      Osteomyelitis      COPD (chronic obstructive pulmonary disease)  on 2L at home and BiPAP at night; intubated 6/18      Hypertension      PVD (peripheral vascular disease)      History of PAT (paroxysmal atrial tachycardia)      Asthma with COPD      BPH (benign prostatic hyperplasia)      Acute osteomyelitis      CABG (Coronary Artery Bypass Graft)  2004      Compound fracture  left leg      S/P primary angioplasty with coronary stent      H/O drainage of abscess  Left femur 12/2021        MEDICATIONS  (STANDING):  albuterol/ipratropium for Nebulization 3 milliLiter(s) Nebulizer every 6 hours  aMIOdarone    Tablet 400  Oral   aMIOdarone    Tablet 200 milliGRAM(s) Oral daily  aspirin  chewable 81 milliGRAM(s) Enteral Tube daily  atorvastatin 80 milliGRAM(s) Oral at bedtime  buDESOnide    Inhalation Suspension 0.5 milliGRAM(s) Inhalation two times a day  ciprofloxacin     Tablet 500 milliGRAM(s) Oral every 12 hours  dextrose 10% Bolus 125 milliLiter(s) IV Bolus once  dextrose 5%. 1000 milliLiter(s) (100 mL/Hr) IV Continuous <Continuous>  dextrose 5%. 1000 milliLiter(s) (50 mL/Hr) IV Continuous <Continuous>  dextrose 5%. 1000 milliLiter(s) (50 mL/Hr) IV Continuous <Continuous>  dextrose 50% Injectable 25 Gram(s) IV Push once  dextrose 50% Injectable 12.5 Gram(s) IV Push once  dronabinol 2.5 milliGRAM(s) Oral two times a day  enoxaparin Injectable 90 milliGRAM(s) SubCutaneous every 12 hours  glucagon  Injectable 1 milliGRAM(s) IntraMuscular once  insulin glargine Injectable (LANTUS) 15 Unit(s) SubCutaneous at bedtime  insulin lispro (ADMELOG) corrective regimen sliding scale   SubCutaneous three times a day before meals  insulin lispro (ADMELOG) corrective regimen sliding scale   SubCutaneous at bedtime  metoprolol tartrate 25 milliGRAM(s) Oral two times a day  montelukast 10 milliGRAM(s) Oral daily  nystatin    Suspension 676075 Unit(s) Oral three times a day    MEDICATIONS  (PRN):  acetaminophen   Oral Liquid .. 650 milliGRAM(s) Oral every 6 hours PRN Temp greater or equal to 38.5C (101.3F)  dextrose Oral Gel 15 Gram(s) Oral once PRN Blood Glucose LESS THAN 70 milliGRAM(s)/deciliter    Allergies    No Known Allergies    Intolerances      Vital Signs Last 24 Hrs  T(C): 36.4 (12 May 2024 11:40), Max: 36.6 (12 May 2024 04:00)  T(F): 97.5 (12 May 2024 11:40), Max: 97.9 (12 May 2024 04:00)  HR: 93 (12 May 2024 11:40) (68 - 93)  BP: 115/74 (12 May 2024 11:40) (112/68 - 141/87)  BP(mean): --  RR: 16 (12 May 2024 11:40) (16 - 18)  SpO2: 98% (12 May 2024 11:40) (93% - 98%)    Parameters below as of 12 May 2024 11:40  Patient On (Oxygen Delivery Method): nasal cannula  O2 Flow (L/min): 2    I&O's Summary    11 May 2024 07:01  -  12 May 2024 07:00  --------------------------------------------------------  IN: 0 mL / OUT: 200 mL / NET: -200 mL        TELE: Not on telemetry   PHYSICAL EXAM:  Constitutional: NAD, awake and alert  HEENT: Moist Mucous Membranes, Anicteric  Pulmonary: Non-labored, dec breath sounds  bilaterally, + exp L>R  wheezing, rales or rhonchi  Cardiovascular: Regular, S1 and S2, No murmurs, rubs, gallops or clicks  Gastrointestinal: Bowel Sounds present, soft, nontender.   Lymph: + b/l UE edema. No lymphadenopathy.  Skin: No visible rashes or ulcers.  Psych:  Mood & affect appropriate  LABS: All Labs Reviewed:                                 13.0   7.99  )-----------( 408      ( 12 May 2024 06:20 )             43.0                         13.2   7.32  )-----------( 338      ( 11 May 2024 07:17 )             42.5                         12.0   7.82  )-----------( 333      ( 10 May 2024 11:20 )             39.0     12 May 2024 06:20    149    |  113    |  25     ----------------------------<  142    3.7     |  33     |  1.60   11 May 2024 10:06    145    |  110    |  24     ----------------------------<  203    3.9     |  31     |  1.60   10 May 2024 13:30    143    |  108    |  24     ----------------------------<  203    4.1     |  31     |  1.60     Ca    10.1       12 May 2024 06:20  Ca    9.6        11 May 2024 10:06  Ca    9.6        10 May 2024 13:30  Phos  2.6       12 May 2024 06:20  Phos  2.8       11 May 2024 10:06  Phos  3.3       10 May 2024 13:30  Mg     2.3       12 May 2024 06:20  Mg     2.2       11 May 2024 10:06  Mg     2.2       10 May 2024 13:30    TPro  6.9    /  Alb  2.9    /  TBili  0.7    /  DBili  x      /  AST  25     /  ALT  22     /  AlkPhos  107    12 May 2024 06:20  TPro  6.8    /  Alb  2.7    /  TBili  0.7    /  DBili  x      /  AST  28     /  ALT  24     /  AlkPhos  100    11 May 2024 10:06          12 Lead ECG:   Ventricular Rate 95 BPM    Atrial Rate 95 BPM    P-R Interval 136 ms    QRS Duration 86 ms    Q-T Interval 352 ms    QTC Calculation(Bazett) 442 ms    P Axis 84 degrees    R Axis 88 degrees    T Axis 58 degrees    Diagnosis Line Normal sinus rhythm  Low voltage QRS  possible BREANNA  Borderline ECG    Confirmed by Cookie Ordaz ([id number]) on 5/2/2024 2:38:25 PM (05-02-24 @ 09:49)      TRANSTHORACIC ECHOCARDIOGRAM REPORT  ________________________________________________________________________________                                      _______       Pt. Name:       YUE COTTO Study Date:    4/26/2024  MRN:            [id number]            YOB: 1940  Accession #:    [id number]           Age:           83 years  Account#:       [id number]          Gender:        M  Visit ID#  Heart Rate:                         Height:        70.08 in (178.00 cm)  Rhythm:                            Weight:        220.46 lb (100.00 kg)  Blood Pressure: 158/69 mmHg         BSA/BMI:       2.18 m² / 31.56 kg/m²  ________________________________________________________________________________________  Referring Physician:   [id number] Marc Grossman  Interpreting Physician: Cookie Ordaz MD  Primary Sonographer:    Clayton Wilkinson    CPT:               ECHO TTE WO CON COMP W DOPP - 81080.m  Indication(s):     Unspecified atrial fibrillation - I48.91  Procedure:         Transthoracic echocardiogram with 2-D, M-mode and complete                     spectral and color flow Doppler.  Ordering Location: [facility]  Admission Status:  Inpatient  Study Information: Image quality for this study is technically difficult.    _______________________________________________________________________________________     CONCLUSIONS:      1. Technically difficult image quality.   2. The LV is not well visualized, however the LV function is probably normal based on limited views.   3. The right ventricle is not well visualized. probably normal systolic function.   4. There is moderate thickening of the aortic valve leaflets.   5. Structurally normal mitral valve with normal leaflet excursion.   6. Trace tricuspid regurgitation.   7. The inferior vena cava is dilated measuring 2.18 cm in diameter, (dilated >2.1cm) with normal inspiratory collapse (normal >50%) consistent with mildly elevated right atrial pressure (~8, range 5-10mmHg).   8. Estimated pulmonary artery systolic pressure is 26 mmHg, consistent with normal pulmonary artery pressure.    ________________________________________________________________________________________  FINDINGS:     Left Ventricle:  There is normal left ventricular diastolic function. Left ventricular endocardium is not well visualized.     Right Ventricle:  The right ventricle is not well visualized. Probably normal systolic function.     Left Atrium:  The left atrium is normal in size with an indexed volume of 16.30 ml/m².     Right Atrium:  The right atrium is normal in size.     Aortic Valve:  The aortic valve anatomy cannot be determined with normal systolic excursion. There is mild calcification of the aortic valve leaflets. There is moderate thickening of the aortic valve leaflets.     Mitral Valve:  Structurally normal mitral valve with normal leaflet excursion. There is trace mitral regurgitation.     Tricuspid Valve:  The tricuspid valve was not well visualized. There is trace tricuspid regurgitation. Estimated pulmonary artery systolic pressure is 26 mmHg, consistent with normal pulmonary artery pressure.     Pulmonic Valve:  The pulmonic valve was not well visualized.     Aorta:  The aortic root at the sinuses of Valsalva is normal in size, measuring 3.30 cm (indexed 1.52 cm/m²).     Systemic Veins:  The inferior vena cava is dilated measuring 2.18 cm in diameter, (dilated >2.1cm) with normal inspiratory collapse (normal >50%) consistent with mildly elevated right atrial pressure (~8, range 5-10mmHg).  ____________________________________________________________________  QUANTITATIVE DATA:  Left Ventricle Measurements: (Indexed to BSA)     IVSd (2D):   1.1 cm  LVPWd (2D):  0.9 cm  LVIDd (2D):  4.0 cm  LVIDs (2D):  2.8 cm  LV Mass:     129 g  59.1 g/m²     MVE Vmax:    0.59 m/s  MV A Vmax:    0.85 m/s  MV E/A:       0.69  e' lateral:   6.96 cm/s  e' medial:    5.98 cm/s  E/e' lateral: 8.43  E/e' medial:  9.82  E/e' Average: 9.07  MV DT:        195 msec    Aorta Measurements: (Normal range) (Indexed to BSA)     Sinuses of Valsalva: 3.30 cm (3.1 - 3.7 cm)       Left Atrium Measurements: (Indexed to BSA)  LA Diam 2D: 3.30 cm       LVOT / RVOT/ Qp/Qs Data: (Indexed to BSA)  LVOT Diameter: 1.80 cm  LVOT Area:     2.54 cm²    Mitral Valve Measurements:     MV E Vmax: 0.6 m/s  MV A Vmax: 0.8 m/s  MV E/A:    0.7       Tricuspid Valve Measurements:     TR Vmax:          2.1 m/s  TR Peak Gradient: 18.1 mmHg  RA Pressure:      8 mmHg  PASP:             26 mmHg    ________________________________________________________________________________________  Electronically signed on 4/26/2024 at 4:46:45 PM by Cookie Ordaz MD         *** Final ***

## 2024-05-12 NOTE — PROGRESS NOTE ADULT - PROBLEM SELECTOR PLAN 2
Chronic, on home Metoprolol BID and Eliquis   - TTE 4/26/2024: LV no well visualized however LV probably normal based on limited views, moderate thickening of aortic valve leaflets, trace TR, dilated IVC with normal inspiratory collapse, normal pulmonary artery pressure  - c/w Eliquis and PO amiodarone  - c/w lopressor 25mg BID  - Cardiology ([facility]) following, recs appreciated

## 2024-05-12 NOTE — PROGRESS NOTE ADULT - ASSESSMENT
83-year-old  black male with history of COPD coronary artery disease hypertension diabetes hyperlipidemia atrial fibrillation on Eliquis as well as osteomyelitis who presents now to the emergency department at [facility] complaining of rapid heart rate    jacy  copd  AF  OP  OA  CAD  HTN  DM  HLD  OM hx  CVA     Pureed diet - swallow studies noted  Physiatry following  s/p Nucala for Asthma - as per home rx regimen  s/p CVA tx - CT head repeat noted -   GOC documented - DNR DNI    follows with Dr Ryland ashley med  uses NIV - BIPAP night time for JACY - sleep hygiene reviewed  cardio eval - [facility]vs rx regimen  BP control  DM care  AF rate and rhythm control  TFT  outpatient record reviewed  proventil PRN - Singulair - copd  spoke with WIFE  wound care

## 2024-05-12 NOTE — PROGRESS NOTE ADULT - SUBJECTIVE AND OBJECTIVE BOX
Patient is a 84y old  Male who presents with a chief complaint of AFib w/ RVR (12 May 2024 09:20)      INTERVAL HPI/OVERNIGHT EVENTS: Patient seen and examined at bedside. No overnight events occurred. Pt seen and examined at bedside, sleeping on arrival. Arousable to verbal stimuli but appears tired. Unable to verbalize but moans/nods to questions. Appears stable neurologically compared to past few days.    MEDICATIONS  (STANDING):  albuterol/ipratropium for Nebulization 3 milliLiter(s) Nebulizer every 6 hours  aMIOdarone    Tablet 400  Oral   aMIOdarone    Tablet 200 milliGRAM(s) Oral daily  aspirin  chewable 81 milliGRAM(s) Enteral Tube daily  atorvastatin 80 milliGRAM(s) Oral at bedtime  buDESOnide    Inhalation Suspension 0.5 milliGRAM(s) Inhalation two times a day  ciprofloxacin     Tablet 500 milliGRAM(s) Oral every 12 hours  dextrose 10% Bolus 125 milliLiter(s) IV Bolus once  dextrose 5%. 1000 milliLiter(s) (100 mL/Hr) IV Continuous <Continuous>  dextrose 5%. 1000 milliLiter(s) (50 mL/Hr) IV Continuous <Continuous>  dextrose 5%. 1000 milliLiter(s) (50 mL/Hr) IV Continuous <Continuous>  dextrose 50% Injectable 25 Gram(s) IV Push once  dextrose 50% Injectable 12.5 Gram(s) IV Push once  dronabinol 2.5 milliGRAM(s) Oral two times a day  enoxaparin Injectable 90 milliGRAM(s) SubCutaneous every 12 hours  glucagon  Injectable 1 milliGRAM(s) IntraMuscular once  insulin glargine Injectable (LANTUS) 15 Unit(s) SubCutaneous at bedtime  insulin lispro (ADMELOG) corrective regimen sliding scale   SubCutaneous three times a day before meals  insulin lispro (ADMELOG) corrective regimen sliding scale   SubCutaneous at bedtime  metoprolol tartrate 25 milliGRAM(s) Oral two times a day  montelukast 10 milliGRAM(s) Oral daily  nystatin    Suspension 681920 Unit(s) Oral three times a day    MEDICATIONS  (PRN):  acetaminophen   Oral Liquid .. 650 milliGRAM(s) Oral every 6 hours PRN Temp greater or equal to 38.5C (101.3F)  dextrose Oral Gel 15 Gram(s) Oral once PRN Blood Glucose LESS THAN 70 milliGRAM(s)/deciliter      Allergies    No Known Allergies    Intolerances        REVIEW OF SYSTEMS:  CONSTITUTIONAL: No fever  HEENT:  No headache  RESPIRATORY: No shortness of breath  CARDIOVASCULAR: No chest pain  GASTROINTESTINAL: No abd pain  GENITOURINARY: No dysuria      Vital Signs Last 24 Hrs  T(C): 36.6 (12 May 2024 04:00), Max: 36.6 (12 May 2024 04:00)  T(F): 97.9 (12 May 2024 04:00), Max: 97.9 (12 May 2024 04:00)  HR: 80 (12 May 2024 07:56) (68 - 90)  BP: 130/81 (12 May 2024 04:00) (112/68 - 141/87)  BP(mean): --  RR: 18 (12 May 2024 04:00) (18 - 18)  SpO2: 95% (12 May 2024 07:56) (93% - 97%)    Parameters below as of 12 May 2024 07:56  Patient On (Oxygen Delivery Method): nasal cannula, 3 L      PHYSICAL EXAM:  GENERAL: NAD, sleeping on arival  HEENT:  +thrush and secretions/drooling  CHEST/LUNG:  +coarse breath sounds b/l. No accessory muscle use  HEART:  RRR, S1, S2. No murmur  ABDOMEN:  BS+, soft, ND  EXTREMITIES: no edema  NERVOUS SYSTEM: +aphasia, nods to answer questions. Following commands. +RUE hemiparesis, able to wiggle toes in RLE. Full strength in b/l LUE and LLE    LABS:                        13.0   7.99  )-----------( 408      ( 12 May 2024 06:20 )             43.0     CBC Full  -  ( 12 May 2024 06:20 )  WBC Count : 7.99 K/uL  Hemoglobin : 13.0 g/dL  Hematocrit : 43.0 %  Platelet Count - Automated : 408 K/uL  Mean Cell Volume : 88.3 fl  Mean Cell Hemoglobin : 26.7 pg  Mean Cell Hemoglobin Concentration : 30.2 gm/dL  Auto Neutrophil # : x  Auto Lymphocyte # : x  Auto Monocyte # : x  Auto Eosinophil # : x  Auto Basophil # : x  Auto Neutrophil % : x  Auto Lymphocyte % : x  Auto Monocyte % : x  Auto Eosinophil % : x  Auto Basophil % : x    12 May 2024 06:20    149    |  113    |  25     ----------------------------<  142    3.7     |  33     |  1.60     Ca    10.1       12 May 2024 06:20  Phos  2.6       12 May 2024 06:20  Mg     2.3       12 May 2024 06:20    TPro  6.9    /  Alb  2.9    /  TBili  0.7    /  DBili  x      /  AST  25     /  ALT  22     /  AlkPhos  107    12 May 2024 06:20      Urinalysis Basic - ( 12 May 2024 06:20 )    Color: x / Appearance: x / SG: x / pH: x  Gluc: 142 mg/dL / Ketone: x  / Bili: x / Urobili: x   Blood: x / Protein: x / Nitrite: x   Leuk Esterase: x / RBC: x / WBC x   Sq Epi: x / Non Sq Epi: x / Bacteria: x      CAPILLARY BLOOD GLUCOSE      POCT Blood Glucose.: 152 mg/dL (12 May 2024 08:04)  POCT Blood Glucose.: 143 mg/dL (11 May 2024 21:26)  POCT Blood Glucose.: 190 mg/dL (11 May 2024 16:56)  POCT Blood Glucose.: 182 mg/dL (11 May 2024 12:13)        Personally reviewed.     Consultant(s) Notes Reviewed:  [x] YES  [ ] NO

## 2024-05-12 NOTE — PROGRESS NOTE ADULT - PROBLEM SELECTOR PLAN 1
MRI Head: Posterior left MCA vascular ischemic stroke. No hemorrhagic conversion, repeat CTs w/ no acute change  - Most likely cardio-embolic, c/w eliquis  - C/w ASA and lipitor qd  - Somnolence can be 2/2 poor PO intake, pt also with poor UO and hypernatremia  - Pt passed Repeat S/S  - Encourage PO intake, marinol 2.5mg BID added  - f/u GI recs for PEG tube consideration, will make NPO after midnight and hold lovenox   - c/w neuro checks and fall/aspiration precautions   - Physiatry following, recs appreciated

## 2024-05-12 NOTE — PROGRESS NOTE ADULT - SUBJECTIVE AND OBJECTIVE BOX
Neurology Follow up note    YUE COTTO83yMale    HPI:  83yoM PMHx AFib on Eliquis, CAD, HTN, DM, HLD, COPD, osteomyelitis presents c/o shortness of breath, chest discomfort. Pt reports onset of rapid heart rate this morning, HR measured 160s when he checked his pulse ox. Also endorses dyspnea exacerbated by movement. Pt reports last episode of AFib in 2020, no episodes since then until today's presentation. Pt states that chest discomfort feels like chest pressure, no chest pain. Pt also reports chronic R big toe wound for the past few months, follows w/ Wound Care. States that he has increased sensitivity to R sole of foot, but denies pain, numbness/tingling. Denies fevers, chills, abdominal pain, N/V/D/C.     IN THE ED:  Temp 98  F ,  , /81  ,RR 18 , SpO2 94%  S/P PO , IV diltiazem 10mg x2, IV diltiazem gtt @ 10 mg/hr  Labs significant for BUN/Cr 25/1.6, troponin 507.9, pBNP 355  Imaging:   CXR wet read: no gross abnormalities (25 Apr 2024 12:13)      Interval History -very low oral intake    Patient is seen, chart was reviewed and case was discussed with the treatment team.  Pt is not in any distress.   Lying on bed comfortably.     Vital Signs Last 24 Hrs  T(C): 36.4 (12 May 2024 11:40), Max: 36.6 (12 May 2024 04:00)  T(F): 97.5 (12 May 2024 11:40), Max: 97.9 (12 May 2024 04:00)  HR: 93 (12 May 2024 11:40) (68 - 93)  BP: 115/74 (12 May 2024 11:40) (112/68 - 141/87)  BP(mean): --  RR: 16 (12 May 2024 11:40) (16 - 18)  SpO2: 95% (12 May 2024 13:53) (93% - 98%)    Parameters below as of 12 May 2024 13:53  Patient On (Oxygen Delivery Method): nasal cannula                                            REVIEW OF SYSTEMS:    Constitutional: No fever, weight loss or fatigue  Eyes: No eye pain, visual disturbances, or discharge  ENT:  No difficulty hearing, tinnitus, vertigo; No sinus or throat pain  Neck: No pain or stiffness  Respiratory: No wheezing, chills or hemoptysis  Cardiovascular: No chest pain, palpitations, shortness of breath, dizziness  Gastrointestinal: No abdominal or epigastric pain. No nausea, vomiting or hematemesis  Genitourinary: No dysuria, frequency, hematuria or incontinence  Neurological: No headaches, numbness or tremors  Psychiatric: No depression, anxiety, mood swings or difficulty sleeping  Musculoskeletal: No joint pain or swelling; No muscle, back or extremity pain  Skin: No itching, burning, rashes or lesions   Lymph Nodes: No enlarged glands  Endocrine: No heat or cold intolerance; No hair loss,   Allergy and Immunologic: No hives or eczema    On Neurological Examination:    Mental Status - Pt is alert, awake, oriented X3.. Follows commands well and able to answer questions appropriately.Mood and affect  normal    Speech -  MIXED APHASIA    Cranial Nerves - Pupils 3 mm equal and reactive to light, extraocular eye movements intact. Pt has no visual field deficit.  Pt has right facial weakness  . Facial sensation is intact.Tongue - is in midline.    Muscle tone - is decreased on right  .      Motor Exam -right hemiparesis; RUE 1/5; LE 1-2/5   No shaking or tremors.    Sensory Exam -. Pt withdraws all extremities equally on stimulation. No asymmetry seen. No complaints of tingling, numbness.        coordination:    Finger to nose: normal-left        Deep tendon Reflexes - 2 plus all over.            Neck Supple -  Yes.      MEDICATIONS  (STANDING):  albuterol/ipratropium for Nebulization 3 milliLiter(s) Nebulizer every 6 hours  aMIOdarone    Tablet 400  Oral   aMIOdarone    Tablet 200 milliGRAM(s) Oral daily  apixaban 2.5 milliGRAM(s) Oral every 12 hours  aspirin  chewable 81 milliGRAM(s) Enteral Tube daily  atorvastatin 80 milliGRAM(s) Oral at bedtime  buDESOnide    Inhalation Suspension 0.5 milliGRAM(s) Inhalation two times a day  ciprofloxacin     Tablet 500 milliGRAM(s) Oral every 12 hours  dextrose 10% Bolus 125 milliLiter(s) IV Bolus once  dextrose 5%. 1000 milliLiter(s) (100 mL/Hr) IV Continuous <Continuous>  dextrose 5%. 1000 milliLiter(s) (50 mL/Hr) IV Continuous <Continuous>  dextrose 50% Injectable 25 Gram(s) IV Push once  dextrose 50% Injectable 12.5 Gram(s) IV Push once  glucagon  Injectable 1 milliGRAM(s) IntraMuscular once  insulin glargine Injectable (LANTUS) 15 Unit(s) SubCutaneous at bedtime  insulin lispro (ADMELOG) corrective regimen sliding scale   SubCutaneous three times a day before meals  insulin lispro (ADMELOG) corrective regimen sliding scale   SubCutaneous at bedtime  metoprolol tartrate 25 milliGRAM(s) Oral two times a day  montelukast 10 milliGRAM(s) Oral daily  nystatin    Suspension [id number] Unit(s) Oral three times a day    MEDICATIONS  (PRN):  acetaminophen   Oral Liquid .. 650 milliGRAM(s) Oral every 6 hours PRN Temp greater or equal to 38.5C (101.3F)  dextrose Oral Gel 15 Gram(s) Oral once PRN Blood Glucose LESS THAN 70 milliGRAM(s)/deciliter            Allergies    No Known Allergies    Intolerances                                     13.0   7.99  )-----------( 408      ( 12 May 2024 06:20 )             43.0               Hemoglobin A1C:     Vitamin B12     RADIOLOGY    ASSESSMENT AND PLAN:      Seen for right sided weakness/ams  consistent subacute left mca infarct(missed one dose of apixaban)  also multiple punctate subacute cerebellar infarcts  most likely cardio-embolic    agree with PEG  aspiration precaution  ON AC and asa  continue statin  Physical therapy evaluation.  Pain is accessed and addressed.  Plan of care was discussed with family(wife_. Questions answered.  Would continue to follow.

## 2024-05-12 NOTE — PROGRESS NOTE ADULT - ASSESSMENT
83-year-old M with history of COPD on home O2 PRN, coronary artery disease s/p CABG,  hypertension, diabetes, hyperlipidemia, atrial fibrillation on Eliquis as well as chronic osteomyelitis who presents to the emergency department at  complaining of rapid heart rate. Cardiology consulted for afib with rvr.    CAD, CABG, HTN, HLD, A fib  - p/w PAF with RVR with SOB at home in ED s/p Cardizem gtt, admits to had missed home BB   - on the morning of 4/27, patient was noted to have new onset aphasia and a code stroke was called.  He was deemed not a candidate for TNK as he is on AC.  He was found to have a L M3 occlusion but was deemed not a candidate for thrombectomy as occlusion too distal.    - Later that night, a RRT was called due to worsening NIH score noted by nursing.   - Repeat CT brain demonstrated moderate-sized evolving acute/subacute L MCA territory infarct but no hemorrhagic conversion.   - neuro following   - Continue ASA and statin     - BP, HR stable and controlled per flow sheet   - continue Lopressor, Amio with hold parameters    - Continue Eliquis 2.5  mg BID. Dose reduced in the setting of renal function and age. Previous Cr of 1.4, if his renal function goes back to baseline of 1.4 would place on full dose Eliquis for CVA protection  - Monitor and replete Lytes. Keep K > 4 and Mg > 2    - DC planning to [facility] per primary   - Will continue to follow.    Nikki Chan [facility]  Nurse Practitioner - Cardiology   call TEAMS

## 2024-05-12 NOTE — PROGRESS NOTE ADULT - ATTENDING COMMENTS
Patient was seen and examined by myself. Case was discussed with house staff in details. I have reviewed and agree with the plan as outlined above with edits where appropriate.    HPI:  83yoM PMHx AFib on Eliquis, CAD, HTN, DM, HLD, COPD, osteomyelitis presents c/o shortness of breath, chest discomfort. Pt reports onset of rapid heart rate this morning, HR measured 160s when he checked his pulse ox. Also endorses dyspnea exacerbated by movement. Pt reports last episode of AFib in 2020, no episodes since then until today's presentation. Pt states that chest discomfort feels like chest pressure, no chest pain. Pt also reports chronic R big toe wound for the past few months, follows w/ Wound Care. States that he has increased sensitivity to R sole of foot, but denies pain, numbness/tingling. Denies fevers, chills, abdominal pain, N/V/D/C.     IN THE ED:  Temp 98  F ,  , /81  ,RR 18 , SpO2 94%  S/P PO , IV diltiazem 10mg x2, IV diltiazem gtt @ 10 mg/hr  Labs significant for BUN/Cr 25/1.6, troponin 507.9, pBNP 355  Imaging:   CXR wet read: no gross abnormalities (25 Apr 2024 12:13)      ROS: as in the HPI; all other ROS negative    SH and family history as above    Vital Signs Last 24 Hrs, stable         GEN: NAD, appears weak, more awake and alert   HEENT- normocephalic; mouth moist, right facial drop, oral thrush   CVS- S1S2+, irregular   LUNGS- clear to auscultation; no wheezing  ABD: Soft , nontender, nondistended, Bowel sounds are present  EXTREMITY: trace Ankle swelling B/L   NEURO: AA; slurred speech, dysarthria, Right side weakness gross motor 1/5, Left  3/5   PSYCH: follows commend better today    BACK: no swelling or mass;   VASCULAR: distal peripheral pulses present  SKIN: warm and dry.       Labs and imaging reviewed      Patient presenting with Patient is a 83y old  Male who presents with a chief complaint of AFib w/ RVR (08 May 2024 16:50)   admitted for    A fib with RVR: now on eliquis and Amiodarone/lopressor, cardio eval noted   CVA: ASA, statin, PT/OT, BP/Afib management , repeat CTH similar finding. discussed with physiatry. acute VS subacute rehab.   dysphagia: purred, S/S re eval noted , aspiration precaution, wife states his oral intake is very limited. nutritional supplement added, 25% oral intake as per nutrition monitoring, option of Peg placement discussed if oral intake inadequate.  will have GI eval    Hypernatremia: with low urine out put, Started D5 IVF  MERRILL: BUN/Cr level elevated but stable , IVF   DM : lantus with RISS , FS goal 100-180    oral thrush: nystatin S/S         Plan of care discussed with patient and wife at bedside  ;  all questions and concerns were addressed.

## 2024-05-12 NOTE — PROGRESS NOTE ADULT - SUBJECTIVE AND OBJECTIVE BOX
Date/Time Patient Seen:  		  Referring MD:   Data Reviewed	       Patient is a 84y old  Male who presents with a chief complaint of AFib w/ RVR (11 May 2024 19:46)      Subjective/HPI     PAST MEDICAL & SURGICAL HISTORY:  Diabetes Mellitus, Type II    CAD (Coronary Artery Disease)  s/p 3v CABG 2004; stents placed in [place] in 2019    Dyslipidemia    Asymptomatic Peripheral Vascular Disease    Osteomyelitis    COPD (chronic obstructive pulmonary disease)  on 2L at home and BiPAP at night; intubated 6/18    Diabetes mellitus    Hypertension    PVD (peripheral vascular disease)    History of PAT (paroxysmal atrial tachycardia)    Asthma with COPD    BPH (benign prostatic hyperplasia)    Acute osteomyelitis    CABG (Coronary Artery Bypass Graft)  2004    Compound fracture  left leg    S/P primary angioplasty with coronary stent    H/O drainage of abscess  Left femur 12/2021          Medication list         MEDICATIONS  (STANDING):  albuterol/ipratropium for Nebulization 3 milliLiter(s) Nebulizer every 6 hours  aMIOdarone    Tablet 400  Oral   aMIOdarone    Tablet 200 milliGRAM(s) Oral daily  aspirin  chewable 81 milliGRAM(s) Enteral Tube daily  atorvastatin 80 milliGRAM(s) Oral at bedtime  buDESOnide    Inhalation Suspension 0.5 milliGRAM(s) Inhalation two times a day  ciprofloxacin     Tablet 500 milliGRAM(s) Oral every 12 hours  dextrose 10% Bolus 125 milliLiter(s) IV Bolus once  dextrose 5%. 1000 milliLiter(s) (100 mL/Hr) IV Continuous <Continuous>  dextrose 5%. 1000 milliLiter(s) (50 mL/Hr) IV Continuous <Continuous>  dextrose 50% Injectable 25 Gram(s) IV Push once  dextrose 50% Injectable 12.5 Gram(s) IV Push once  enoxaparin Injectable 90 milliGRAM(s) SubCutaneous every 12 hours  glucagon  Injectable 1 milliGRAM(s) IntraMuscular once  insulin glargine Injectable (LANTUS) 15 Unit(s) SubCutaneous at bedtime  insulin lispro (ADMELOG) corrective regimen sliding scale   SubCutaneous three times a day before meals  insulin lispro (ADMELOG) corrective regimen sliding scale   SubCutaneous at bedtime  metoprolol tartrate 25 milliGRAM(s) Oral two times a day  montelukast 10 milliGRAM(s) Oral daily  nystatin    Suspension 283536 Unit(s) Oral three times a day    MEDICATIONS  (PRN):  acetaminophen   Oral Liquid .. 650 milliGRAM(s) Oral every 6 hours PRN Temp greater or equal to 38.5C (101.3F)  dextrose Oral Gel 15 Gram(s) Oral once PRN Blood Glucose LESS THAN 70 milliGRAM(s)/deciliter         Vitals log        ICU Vital Signs Last 24 Hrs  T(C): 36.6 (12 May 2024 04:00), Max: 36.6 (12 May 2024 04:00)  T(F): 97.9 (12 May 2024 04:00), Max: 97.9 (12 May 2024 04:00)  HR: 80 (12 May 2024 07:56) (68 - 91)  BP: 130/81 (12 May 2024 04:00) (112/68 - 141/87)  BP(mean): --  ABP: --  ABP(mean): --  RR: 18 (12 May 2024 04:00) (18 - 18)  SpO2: 95% (12 May 2024 07:56) (93% - 97%)    O2 Parameters below as of 12 May 2024 07:56  Patient On (Oxygen Delivery Method): nasal cannula, 3 L                 Input and Output:  I&O's Detail    11 May 2024 07:01  -  12 May 2024 07:00  --------------------------------------------------------  IN:  Total IN: 0 mL    OUT:    Incontinent per Condom Catheter (mL): 200 mL  Total OUT: 200 mL    Total NET: -200 mL          Lab Data                        13.0   7.99  )-----------( 408      ( 12 May 2024 06:20 )             43.0     05-12    149<H>  |  113<H>  |  25<H>  ----------------------------<  142<H>  3.7   |  33<H>  |  1.60<H>    Ca    10.1      12 May 2024 06:20  Phos  2.6     05-12  Mg     2.3     05-12    TPro  6.9  /  Alb  2.9<L>  /  TBili  0.7  /  DBili  x   /  AST  25  /  ALT  22  /  AlkPhos  107  05-12            Review of Systems	      Objective     Physical Examination    heart s1s2  lung dc BS  head nc      Pertinent Lab findings & Imaging      Eliecer:  NO   Adequate UO     I&O's Detail    11 May 2024 07:01  -  12 May 2024 07:00  --------------------------------------------------------  IN:  Total IN: 0 mL    OUT:    Incontinent per Condom Catheter (mL): 200 mL  Total OUT: 200 mL    Total NET: -200 mL               Discussed with:     Cultures:	        Radiology

## 2024-05-12 NOTE — PROGRESS NOTE ADULT - PROBLEM SELECTOR PLAN 4
Chronic, CKD3 baseline Cr 1.1  - Hypernatremic, likely 2/2 poor PO intake  - Start d5 @ 50cc/hr for 12hr  - Cr stable, continue to monitor  - Continue to hold home furosemide  - Monitor volume status closely  - Nephrology (Dr. Regan) following, recs appreciated

## 2024-05-13 NOTE — PROGRESS NOTE ADULT - PROBLEM SELECTOR PLAN 1
MRI Head: Posterior left MCA vascular ischemic stroke. No hemorrhagic conversion, repeat CTs w/ no acute change  - Most likely cardio-embolic, c/w eliquis  - C/w ASA and lipitor qd  - Somnolence can be 2/2 poor PO intake, pt also with poor UO and hypernatremia  - Pt passed Repeat S/S  - c/w marinol 2.5mg BID added  - f/u GI recs for PEG tube consideration,  - c/w neuro checks and fall/aspiration precautions   - Physiatry following, recs appreciated MRI Head: Posterior left MCA vascular ischemic stroke. No hemorrhagic conversion, repeat CTs w/ no acute change  - Most likely cardio-embolic  - Hold eliquis for PEG placement wednesday  - C/w ASA and lipitor qd  - Somnolence can be 2/2 poor PO intake, pt also with poor UO and hypernatremia  - Pt passed Repeat S/S  - c/w marinol 2.5mg BID added  - f/u GI recs for PEG tube consideration,  - c/w neuro checks and fall/aspiration precautions   - Physiatry following, recs appreciated

## 2024-05-13 NOTE — PROGRESS NOTE ADULT - ATTENDING COMMENTS
Patient was seen and examined by myself. Case was discussed with house staff in details. I have reviewed and agree with the plan as outlined above with edits where appropriate.    HPI:  83yoM PMHx AFib on Eliquis, CAD, HTN, DM, HLD, COPD, osteomyelitis presents c/o shortness of breath, chest discomfort. Pt reports onset of rapid heart rate this morning, HR measured 160s when he checked his pulse ox. Also endorses dyspnea exacerbated by movement. Pt reports last episode of AFib in 2020, no episodes since then until today's presentation. Pt states that chest discomfort feels like chest pressure, no chest pain. Pt also reports chronic R big toe wound for the past few months, follows w/ Wound Care. States that he has increased sensitivity to R sole of foot, but denies pain, numbness/tingling. Denies fevers, chills, abdominal pain, N/V/D/C.     IN THE ED:  Temp 98  F ,  , /81  ,RR 18 , SpO2 94%  S/P PO , IV diltiazem 10mg x2, IV diltiazem gtt @ 10 mg/hr  Labs significant for BUN/Cr 25/1.6, troponin 507.9, pBNP 355  Imaging:   CXR wet read: no gross abnormalities (25 Apr 2024 12:13)      ROS: as in the HPI; all other ROS negative    SH and family history as above    Vital Signs Last 24 Hrs, stable         GEN: NAD, more awake and alert   HEENT- normocephalic; mouth moist, right facial drop, oral thrush   CVS- S1S2+, irregular   LUNGS- clear to auscultation; no wheezing  ABD: Soft , nontender, nondistended, Bowel sounds are present  EXTREMITY: trace Ankle swelling B/L   NEURO: AA; slurred speech, dysarthria, Right side weakness gross motor 1/5, Left  3/5   PSYCH: follows commend better today    BACK: no swelling or mass;   VASCULAR: distal peripheral pulses present  SKIN: warm and dry.       Labs and imaging reviewed      Patient presenting with Patient is a 83y old  Male who presents with a chief complaint of AFib w/ RVR (08 May 2024 16:50)   admitted for    A fib with RVR: now on eliquis and Amiodarone/lopressor, cardio eval noted   CVA: ASA, statin, PT/OT, BP/Afib management , repeat CTH similar finding. discussed with physiatry. acute VS subacute rehab.   dysphagia: purred, S/S re eval noted , aspiration precaution, wife states his oral intake is very limited. nutritional supplement added, 25%-50% oral intake as per nutrition monitoring, Hypernatremia likley due to decreased free water intake.   Hypernatremia: with low urine out put, Started D5 IVF  MERRILL: BUN/Cr level elevated but stable , IVF   DM : lantus with RISS , FS goal 100-180    oral thrush: nystatin S/S         Plan of care discussed with patient and wife at bedside  ;  all questions and concerns were addressed.

## 2024-05-13 NOTE — SOCIAL WORK PROGRESS NOTE - NSSWPROGRESSNOTE_GEN_ALL_CORE
Discussed today at rounds. Auth letter received  for [facility] Ref#[id number] from 5/10 for 7 days, pawan Ref#[id number]. Auth letter sent to [facility] today. Informed patient is scheduled for peg feeding tube placement today. I met with wife at bedside. She is aware that [facility] denied patient. Informed her that [facility] accepted and we have insurance auth. She states she visited [facility] and was not impressed. States she may look at list for other options. Informed her patient will not be cleared for d/c today. Social work to follow.  Discussed today at rounds. Auth letter received  for [facility] Ref#[id number] from 5/10 for 7 days, pawan Ref#[id number]. Auth letter sent to [facility] today. Informed patient is scheduled for peg feeding tube placement today. I met with wife at bedside. She is aware that [facility] denied patient. Informed her that [facility] accepted and we have insurance auth. She states she visited [facility] and was not impressed. States she may look at list for other options. Wife aware we can re-refer to any facility if condition improves and he is able to do more with PT. Informed her patient will not be cleared for d/c today. Social work to follow.

## 2024-05-13 NOTE — PROGRESS NOTE ADULT - SUBJECTIVE AND OBJECTIVE BOX
[facility] -- Génesis Villavicencio Pannella, [place], [place] Heath: Office # [phone]    Follow Up:   CAD, AF, CVA    Subjective/Observations: Patient seen and examined. Patient alert awake, weak, confuse. Unable to provide meaningful information at this time on O2 via NC.       REVIEW OF SYSTEMS: All other review of systems are negative unless indicated above    PAST MEDICAL & SURGICAL HISTORY:  Diabetes Mellitus, Type II      CAD (Coronary Artery Disease)  s/p 3v CABG 2004; stents placed in [place] in 2019      Dyslipidemia      Osteomyelitis      COPD (chronic obstructive pulmonary disease)  on 2L at home and BiPAP at night; intubated 6/18      Hypertension      PVD (peripheral vascular disease)      History of PAT (paroxysmal atrial tachycardia)      Asthma with COPD      BPH (benign prostatic hyperplasia)      Acute osteomyelitis      CABG (Coronary Artery Bypass Graft)  2004      Compound fracture  left leg      S/P primary angioplasty with coronary stent      H/O drainage of abscess  Left femur 12/2021          MEDICATIONS  (STANDING):  albuterol/ipratropium for Nebulization 3 milliLiter(s) Nebulizer every 6 hours  aMIOdarone    Tablet 400  Oral   aMIOdarone    Tablet 200 milliGRAM(s) Oral daily  aspirin  chewable 81 milliGRAM(s) Enteral Tube daily  atorvastatin 80 milliGRAM(s) Oral at bedtime  buDESOnide    Inhalation Suspension 0.5 milliGRAM(s) Inhalation two times a day  ciprofloxacin     Tablet 500 milliGRAM(s) Oral every 12 hours  dextrose 10% Bolus 125 milliLiter(s) IV Bolus once  dextrose 5%. 1000 milliLiter(s) (100 mL/Hr) IV Continuous <Continuous>  dextrose 5%. 1000 milliLiter(s) (50 mL/Hr) IV Continuous <Continuous>  dextrose 5%. 1000 milliLiter(s) (50 mL/Hr) IV Continuous <Continuous>  dextrose 50% Injectable 25 Gram(s) IV Push once  dextrose 50% Injectable 12.5 Gram(s) IV Push once  dronabinol 2.5 milliGRAM(s) Oral two times a day  glucagon  Injectable 1 milliGRAM(s) IntraMuscular once  insulin glargine Injectable (LANTUS) 15 Unit(s) SubCutaneous at bedtime  insulin lispro (ADMELOG) corrective regimen sliding scale   SubCutaneous three times a day before meals  insulin lispro (ADMELOG) corrective regimen sliding scale   SubCutaneous at bedtime  metoprolol tartrate 25 milliGRAM(s) Oral two times a day  montelukast 10 milliGRAM(s) Oral daily  nystatin    Suspension 501368 Unit(s) Oral three times a day  potassium chloride  10 mEq/100 mL IVPB 10 milliEquivalent(s) IV Intermittent every 1 hour    MEDICATIONS  (PRN):  acetaminophen   Oral Liquid .. 650 milliGRAM(s) Oral every 6 hours PRN Temp greater or equal to 38.5C (101.3F)  dextrose Oral Gel 15 Gram(s) Oral once PRN Blood Glucose LESS THAN 70 milliGRAM(s)/deciliter    Allergies    No Known Allergies    Intolerances      Vital Signs Last 24 Hrs  T(C): 36.8 (13 May 2024 11:24), Max: 36.8 (13 May 2024 11:24)  T(F): 98.2 (13 May 2024 11:24), Max: 98.2 (13 May 2024 11:24)  HR: 95 (13 May 2024 11:24) (75 - 95)  BP: 121/76 (13 May 2024 11:24) (113/83 - 129/80)  BP(mean): --  RR: 19 (13 May 2024 11:24) (18 - 19)  SpO2: 95% (13 May 2024 11:24) (94% - 98%)    Parameters below as of 13 May 2024 11:24  Patient On (Oxygen Delivery Method): room air  O2 Flow (L/min): 2    I&O's Summary    12 May 2024 07:01  -  13 May 2024 07:00  --------------------------------------------------------  IN: 300 mL / OUT: 400 mL / NET: -100 mL          TELE: not on tele  PHYSICAL EXAM:  Constitutional: NAD, awake and alert,   HEENT: Moist Mucous Membranes, Anicteric  Pulmonary: Non-labored, breath sounds decrease  bilaterally,+ mild exp  wheezing,  Cardiovascular: Regular, S1 and S2, No murmurs, No rubs, gallops or clicks  Gastrointestinal:  soft, nontender, nondistended   Lymph: + b/l UE edema. No lymphadenopathy.   Skin: No visible rashes or ulcers.  Psych:  Mood & affect appropriate      LABS: All Labs Reviewed:                        12.6   7.22  )-----------( 371      ( 13 May 2024 06:52 )             41.8                         13.0   7.99  )-----------( 408      ( 12 May 2024 06:20 )             43.0                         13.2   7.32  )-----------( 338      ( 11 May 2024 07:17 )             42.5     13 May 2024 06:52    146    |  111    |  23     ----------------------------<  165    3.4     |  33     |  1.60   12 May 2024 06:20    149    |  113    |  25     ----------------------------<  142    3.7     |  33     |  1.60   11 May 2024 10:06    145    |  110    |  24     ----------------------------<  203    3.9     |  31     |  1.60     Ca    9.9        13 May 2024 06:52  Ca    10.1       12 May 2024 06:20  Ca    9.6        11 May 2024 10:06  Phos  2.6       13 May 2024 06:52  Phos  2.6       12 May 2024 06:20  Phos  2.8       11 May 2024 10:06  Mg     2.1       13 May 2024 06:52  Mg     2.3       12 May 2024 06:20  Mg     2.2       11 May 2024 10:06    TPro  6.4    /  Alb  2.7    /  TBili  0.6    /  DBili  x      /  AST  22     /  ALT  20     /  AlkPhos  101    13 May 2024 06:52  TPro  6.9    /  Alb  2.9    /  TBili  0.7    /  DBili  x      /  AST  25     /  ALT  22     /  AlkPhos  107    12 May 2024 06:20  TPro  6.8    /  Alb  2.7    /  TBili  0.7    /  DBili  x      /  AST  28     /  ALT  24     /  AlkPhos  100    11 May 2024 10:06   LIVER FUNCTIONS - ( 13 May 2024 06:52 )  Alb: 2.7 g/dL / Pro: 6.4 g/dL / ALK PHOS: 101 U/L / ALT: 20 U/L / AST: 22 U/L / GGT: x           Triiodothyronine, Total (T3 Total): 112 ng/dL (04-25-24 @ 15:45)  Cholesterol: 124 mg/dL (04-26-24 @ 08:05)  HDL Cholesterol: 48 mg/dL (04-26-24 @ 08:05)  Triglycerides, Serum: 93 mg/dL (04-26-24 @ 08:05)    12 Lead ECG:   Ventricular Rate 95 BPM    Atrial Rate 95 BPM    P-R Interval 136 ms    QRS Duration 86 ms    Q-T Interval 352 ms    QTC Calculation(Bazett) 442 ms    P Axis 84 degrees    R Axis 88 degrees    T Axis 58 degrees    Diagnosis Line Normal sinus rhythm  Low voltage QRS  possible BREANNA  Borderline ECG    Confirmed by Cookie Ordaz ([id number]) on 5/2/2024 2:38:25 PM (05-02-24 @ 09:49)      TRANSTHORACIC ECHOCARDIOGRAM REPORT  ________________________________________________________________________________                                      _______       Pt. Name:       YUE COTTO Study Date:    4/26/2024  MRN:            [id number]            YOB: 1940  Accession #:    [id number]           Age:           83 years  Account#:       [id number]          Gender:        M  Visit ID#  Heart Rate:                         Height:        70.08 in (178.00 cm)  Rhythm:                            Weight:        220.46 lb (100.00 kg)  Blood Pressure: 158/69 mmHg         BSA/BMI:       2.18 m² / 31.56 kg/m²  ________________________________________________________________________________________  Referring Physician:   [id number] Marc Grossman  Interpreting Physician: Cookie Ordaz MD  Primary Sonographer:    Clayton Wilkinson    CPT:               ECHO TTE WO CON COMP W DOPP - 30192.m  Indication(s):     Unspecified atrial fibrillation - I48.91  Procedure:         Transthoracic echocardiogram with 2-D, M-mode and complete                     spectral and color flow Doppler.  Ordering Location: [facility]  Admission Status:  Inpatient  Study Information: Image quality for this study is technically difficult.    _______________________________________________________________________________________     CONCLUSIONS:      1. Technically difficult image quality.   2. The LV is not well visualized, however the LV function is probably normal based on limited views.   3. The right ventricle is not well visualized. probably normal systolic function.   4. There is moderate thickening of the aortic valve leaflets.   5. Structurally normal mitral valve with normal leaflet excursion.   6. Trace tricuspid regurgitation.   7. The inferior vena cava is dilated measuring 2.18 cm in diameter, (dilated >2.1cm) with normal inspiratory collapse (normal >50%) consistent with mildly elevated right atrial pressure (~8, range 5-10mmHg).   8. Estimated pulmonary artery systolic pressure is 26 mmHg, consistent with normal pulmonary artery pressure.    ________________________________________________________________________________________  FINDINGS:     Left Ventricle:  There is normal left ventricular diastolic function. Left ventricular endocardium is not well visualized.     Right Ventricle:  The right ventricle is not well visualized. Probably normal systolic function.     Left Atrium:  The left atrium is normal in size with an indexed volume of 16.30 ml/m².     Right Atrium:  The right atrium is normal in size.     Aortic Valve:  The aortic valve anatomy cannot be determined with normal systolic excursion. There is mild calcification of the aortic valve leaflets. There is moderate thickening of the aortic valve leaflets.     Mitral Valve:  Structurally normal mitral valve with normal leaflet excursion. There is trace mitral regurgitation.     Tricuspid Valve:  The tricuspid valve was not well visualized. There is trace tricuspid regurgitation. Estimated pulmonary artery systolic pressure is 26 mmHg, consistent with normal pulmonary artery pressure.     Pulmonic Valve:  The pulmonic valve was not well visualized.     Aorta:  The aortic root at the sinuses of Valsalva is normal in size, measuring 3.30 cm (indexed 1.52 cm/m²).     Systemic Veins:  The inferior vena cava is dilated measuring 2.18 cm in diameter, (dilated >2.1cm) with normal inspiratory collapse (normal >50%) consistent with mildly elevated right atrial pressure (~8, range 5-10mmHg).  ____________________________________________________________________  QUANTITATIVE DATA:  Left Ventricle Measurements: (Indexed to BSA)     IVSd (2D):   1.1 cm  LVPWd (2D):  0.9 cm  LVIDd (2D):  4.0 cm  LVIDs (2D):  2.8 cm  LV Mass:     129 g  59.1 g/m²     MVE Vmax:    0.59 m/s  MV A Vmax:    0.85 m/s  MV E/A:       0.69  e' lateral:   6.96 cm/s  e' medial:    5.98 cm/s  E/e' lateral: 8.43  E/e' medial:  9.82  E/e' Average: 9.07  MV DT:        195 msec    Aorta Measurements: (Normal range) (Indexed to BSA)     Sinuses of Valsalva: 3.30 cm (3.1 - 3.7 cm)       Left Atrium Measurements: (Indexed to BSA)  LA Diam 2D: 3.30 cm       LVOT / RVOT/ Qp/Qs Data: (Indexed to BSA)  LVOT Diameter: 1.80 cm  LVOT Area:     2.54 cm²    Mitral Valve Measurements:     MV E Vmax: 0.6 m/s  MV A Vmax: 0.8 m/s  MV E/A:    0.7       Tricuspid Valve Measurements:     TR Vmax:          2.1 m/s  TR Peak Gradient: 18.1 mmHg  RA Pressure:      8 mmHg  PASP:             26 mmHg    ________________________________________________________________________________________  Electronically signed on 4/26/2024 at 4:46:45 PM by Cookie Ordaz MD         *** Final ***   [facility] -- Génesis Villavicencio Pannella, [place], [place]: Office # [phone]    Follow Up:   CAD, AF, CVA    Subjective/Observations: Patient seen and examined. Patient alert awake, weak, confuse. Unable to provide meaningful information at this time on O2 via NC.       REVIEW OF SYSTEMS: All other review of systems are negative unless indicated above    PAST MEDICAL & SURGICAL HISTORY:  Diabetes Mellitus, Type II      CAD (Coronary Artery Disease)  s/p 3v CABG 2004; stents placed [place] in 2019      Dyslipidemia      Osteomyelitis      COPD (chronic obstructive pulmonary disease)  on 2L at home and BiPAP at night; intubated 6/18      Hypertension      PVD (peripheral vascular disease)      History of PAT (paroxysmal atrial tachycardia)      Asthma with COPD      BPH (benign prostatic hyperplasia)      Acute osteomyelitis      CABG (Coronary Artery Bypass Graft)  2004      Compound fracture  left leg      S/P primary angioplasty with coronary stent      H/O drainage of abscess  Left femur 12/2021          MEDICATIONS  (STANDING):  albuterol/ipratropium for Nebulization 3 milliLiter(s) Nebulizer every 6 hours  aMIOdarone    Tablet 400  Oral   aMIOdarone    Tablet 200 milliGRAM(s) Oral daily  aspirin  chewable 81 milliGRAM(s) Enteral Tube daily  atorvastatin 80 milliGRAM(s) Oral at bedtime  buDESOnide    Inhalation Suspension 0.5 milliGRAM(s) Inhalation two times a day  ciprofloxacin     Tablet 500 milliGRAM(s) Oral every 12 hours  dextrose 10% Bolus 125 milliLiter(s) IV Bolus once  dextrose 5%. 1000 milliLiter(s) (100 mL/Hr) IV Continuous <Continuous>  dextrose 5%. 1000 milliLiter(s) (50 mL/Hr) IV Continuous <Continuous>  dextrose 5%. 1000 milliLiter(s) (50 mL/Hr) IV Continuous <Continuous>  dextrose 50% Injectable 25 Gram(s) IV Push once  dextrose 50% Injectable 12.5 Gram(s) IV Push once  dronabinol 2.5 milliGRAM(s) Oral two times a day  glucagon  Injectable 1 milliGRAM(s) IntraMuscular once  insulin glargine Injectable (LANTUS) 15 Unit(s) SubCutaneous at bedtime  insulin lispro (ADMELOG) corrective regimen sliding scale   SubCutaneous three times a day before meals  insulin lispro (ADMELOG) corrective regimen sliding scale   SubCutaneous at bedtime  metoprolol tartrate 25 milliGRAM(s) Oral two times a day  montelukast 10 milliGRAM(s) Oral daily  nystatin    Suspension [id number] Unit(s) Oral three times a day  potassium chloride  10 mEq/100 mL IVPB 10 milliEquivalent(s) IV Intermittent every 1 hour    MEDICATIONS  (PRN):  acetaminophen   Oral Liquid .. 650 milliGRAM(s) Oral every 6 hours PRN Temp greater or equal to 38.5C (101.3F)  dextrose Oral Gel 15 Gram(s) Oral once PRN Blood Glucose LESS THAN 70 milliGRAM(s)/deciliter    Allergies    No Known Allergies    Intolerances      Vital Signs Last 24 Hrs  T(C): 36.8 (13 May 2024 11:24), Max: 36.8 (13 May 2024 11:24)  T(F): 98.2 (13 May 2024 11:24), Max: 98.2 (13 May 2024 11:24)  HR: 95 (13 May 2024 11:24) (75 - 95)  BP: 121/76 (13 May 2024 11:24) (113/83 - 129/80)  BP(mean): --  RR: 19 (13 May 2024 11:24) (18 - 19)  SpO2: 95% (13 May 2024 11:24) (94% - 98%)    Parameters below as of 13 May 2024 11:24  Patient On (Oxygen Delivery Method): room air  O2 Flow (L/min): 2    I&O's Summary    12 May 2024 07:01  -  13 May 2024 07:00  --------------------------------------------------------  IN: 300 mL / OUT: 400 mL / NET: -100 mL          TELE: not on tele  PHYSICAL EXAM:  Constitutional: NAD, awake and alert,   HEENT: Moist Mucous Membranes, Anicteric  Pulmonary: Non-labored, breath sounds decrease  bilaterally,+ mild exp  wheezing, coarse bs  Cardiovascular: Regular, S1 and S2, No murmurs, No rubs, gallops or clicks  Gastrointestinal:  soft, nontender, nondistended   Lymph: + b/l UE edema. No lymphadenopathy.   Skin: No visible rashes or ulcers.  Psych:  Mood & affect appropriate      LABS: All Labs Reviewed:                        12.6   7.22  )-----------( 371      ( 13 May 2024 06:52 )             41.8                         13.0   7.99  )-----------( 408      ( 12 May 2024 06:20 )             43.0                         13.2   7.32  )-----------( 338      ( 11 May 2024 07:17 )             42.5     13 May 2024 06:52    146    |  111    |  23     ----------------------------<  165    3.4     |  33     |  1.60   12 May 2024 06:20    149    |  113    |  25     ----------------------------<  142    3.7     |  33     |  1.60   11 May 2024 10:06    145    |  110    |  24     ----------------------------<  203    3.9     |  31     |  1.60     Ca    9.9        13 May 2024 06:52  Ca    10.1       12 May 2024 06:20  Ca    9.6        11 May 2024 10:06  Phos  2.6       13 May 2024 06:52  Phos  2.6       12 May 2024 06:20  Phos  2.8       11 May 2024 10:06  Mg     2.1       13 May 2024 06:52  Mg     2.3       12 May 2024 06:20  Mg     2.2       11 May 2024 10:06    TPro  6.4    /  Alb  2.7    /  TBili  0.6    /  DBili  x      /  AST  22     /  ALT  20     /  AlkPhos  101    13 May 2024 06:52  TPro  6.9    /  Alb  2.9    /  TBili  0.7    /  DBili  x      /  AST  25     /  ALT  22     /  AlkPhos  107    12 May 2024 06:20  TPro  6.8    /  Alb  2.7    /  TBili  0.7    /  DBili  x      /  AST  28     /  ALT  24     /  AlkPhos  100    11 May 2024 10:06   LIVER FUNCTIONS - ( 13 May 2024 06:52 )  Alb: 2.7 g/dL / Pro: 6.4 g/dL / ALK PHOS: 101 U/L / ALT: 20 U/L / AST: 22 U/L / GGT: x           Triiodothyronine, Total (T3 Total): 112 ng/dL (04-25-24 @ 15:45)  Cholesterol: 124 mg/dL (04-26-24 @ 08:05)  HDL Cholesterol: 48 mg/dL (04-26-24 @ 08:05)  Triglycerides, Serum: 93 mg/dL (04-26-24 @ 08:05)    12 Lead ECG:   Ventricular Rate 95 BPM    Atrial Rate 95 BPM    P-R Interval 136 ms    QRS Duration 86 ms    Q-T Interval 352 ms    QTC Calculation(Bazett) 442 ms    P Axis 84 degrees    R Axis 88 degrees    T Axis 58 degrees    Diagnosis Line Normal sinus rhythm  Low voltage QRS  possible BREANNA  Borderline ECG    Confirmed by Cookie Ordaz ([id number]) on 5/2/2024 2:38:25 PM (05-02-24 @ 09:49)      TRANSTHORACIC ECHOCARDIOGRAM REPORT  ________________________________________________________________________________                                      _______       Pt. Name:       YUE COTTO Study Date:    4/26/2024  MRN:            [id number]            YOB: 1940  Accession #:    [id number]           Age:           83 years  Account#:       [id number]          Gender:        M  Visit ID#  Heart Rate:                         Height:        70.08 in (178.00 cm)  Rhythm:                            Weight:        220.46 lb (100.00 kg)  Blood Pressure: 158/69 mmHg         BSA/BMI:       2.18 m² / 31.56 kg/m²  ________________________________________________________________________________________  Referring Physician:   [id number] Marc Grossman  Interpreting Physician: Cookie Ordaz MD  Primary Sonographer:    Clayton Wilkinson    CPT:               ECHO TTE WO CON COMP W DOPP - 74762.m  Indication(s):     Unspecified atrial fibrillation - I48.91  Procedure:         Transthoracic echocardiogram with 2-D, M-mode and complete                     spectral and color flow Doppler.  Ordering Location: [facility] (formerly [facility]3]  Admission Status:  Inpatient  Study Information: Image quality for this study is technically difficult.    _______________________________________________________________________________________     CONCLUSIONS:      1. Technically difficult image quality.   2. The LV is not well visualized, however the LV function is probably normal based on limited views.   3. The right ventricle is not well visualized. probably normal systolic function.   4. There is moderate thickening of the aortic valve leaflets.   5. Structurally normal mitral valve with normal leaflet excursion.   6. Trace tricuspid regurgitation.   7. The inferior vena cava is dilated measuring 2.18 cm in diameter, (dilated >2.1cm) with normal inspiratory collapse (normal >50%) consistent with mildly elevated right atrial pressure (~8, range 5-10mmHg).   8. Estimated pulmonary artery systolic pressure is 26 mmHg, consistent with normal pulmonary artery pressure.    ________________________________________________________________________________________  FINDINGS:     Left Ventricle:  There is normal left ventricular diastolic function. Left ventricular endocardium is not well visualized.     Right Ventricle:  The right ventricle is not well visualized. Probably normal systolic function.     Left Atrium:  The left atrium is normal in size with an indexed volume of 16.30 ml/m².     Right Atrium:  The right atrium is normal in size.     Aortic Valve:  The aortic valve anatomy cannot be determined with normal systolic excursion. There is mild calcification of the aortic valve leaflets. There is moderate thickening of the aortic valve leaflets.     Mitral Valve:  Structurally normal mitral valve with normal leaflet excursion. There is trace mitral regurgitation.     Tricuspid Valve:  The tricuspid valve was not well visualized. There is trace tricuspid regurgitation. Estimated pulmonary artery systolic pressure is 26 mmHg, consistent with normal pulmonary artery pressure.     Pulmonic Valve:  The pulmonic valve was not well visualized.     Aorta:  The aortic root at the sinuses of Valsalva is normal in size, measuring 3.30 cm (indexed 1.52 cm/m²).     Systemic Veins:  The inferior vena cava is dilated measuring 2.18 cm in diameter, (dilated >2.1cm) with normal inspiratory collapse (normal >50%) consistent with mildly elevated right atrial pressure (~8, range 5-10mmHg).  ____________________________________________________________________  QUANTITATIVE DATA:  Left Ventricle Measurements: (Indexed to BSA)     IVSd (2D):   1.1 cm  LVPWd (2D):  0.9 cm  LVIDd (2D):  4.0 cm  LVIDs (2D):  2.8 cm  LV Mass:     129 g  59.1 g/m²     MVE Vmax:    0.59 m/s  MV A Vmax:    0.85 m/s  MV E/A:       0.69  e' lateral:   6.96 cm/s  e' medial:    5.98 cm/s  E/e' lateral: 8.43  E/e' medial:  9.82  E/e' Average: 9.07  MV DT:        195 msec    Aorta Measurements: (Normal range) (Indexed to BSA)     Sinuses of Valsalva: 3.30 cm (3.1 - 3.7 cm)       Left Atrium Measurements: (Indexed to BSA)  LA Diam 2D: 3.30 cm       LVOT / RVOT/ Qp/Qs Data: (Indexed to BSA)  LVOT Diameter: 1.80 cm  LVOT Area:     2.54 cm²    Mitral Valve Measurements:     MV E Vmax: 0.6 m/s  MV A Vmax: 0.8 m/s  MV E/A:    0.7       Tricuspid Valve Measurements:     TR Vmax:          2.1 m/s  TR Peak Gradient: 18.1 mmHg  RA Pressure:      8 mmHg  PASP:             26 mmHg    ________________________________________________________________________________________  Electronically signed on 4/26/2024 at 4:46:45 PM by Cookie Ordaz MD         *** Final ***

## 2024-05-13 NOTE — PROGRESS NOTE ADULT - ASSESSMENT
83-year-old M with history of COPD on home O2 PRN, coronary artery disease s/p CABG,  hypertension, diabetes, hyperlipidemia, atrial fibrillation on Eliquis as well as chronic osteomyelitis who presents to the emergency department at [facility] complaining of rapid heart rate. Cardiology consulted for afib with rvr.    CAD, CABG, HTN, HLD, A fib  - p/w PAF with RVR with SOB at home in ED s/p Cardizem gtt, admits to had missed home BB   - on the morning of 4/27, patient was noted to have new onset aphasia and a code stroke was called.  He was deemed not a candidate for TNK as he is on AC.  He was found to have a L M3 occlusion but was deemed not a candidate for thrombectomy as occlusion too distal.    - Repeat CT brain demonstrated moderate-sized evolving acute/subacute L MCA territory infarct but no hemorrhagic conversion.   - neuro following   - Continue ASA and statin   - echo 4/26 : Technically difficult image quality.probably normal based on limited views          - BP, HR stable and controlled per flow sheet   - continue Lopressor, Amio with hold parameters    - Continue Eliquis 2.5  mg BID. Dose reduced in the setting of renal function and age. Previous Cr of 1.4, if his renal function goes back to baseline of 1.4 would place on full dose Eliquis for CVA protection  - Monitor and replete Lytes. Keep K > 4 and Mg > 2    - DC planning to [facility] per primary   - Will continue to follow. 83-year-old M with history of COPD on home O2 PRN, coronary artery disease s/p CABG,  hypertension, diabetes, hyperlipidemia, atrial fibrillation on Eliquis as well as chronic osteomyelitis who presents to the emergency department at [facility] complaining of rapid heart rate. Cardiology consulted for afib with rvr.    CAD, CABG, HTN, HLD, A fib  - p/w PAF with RVR with SOB at home in ED s/p Cardizem gtt, admits to had missed home BB   - on the morning of 4/27, patient was noted to have new onset aphasia and a code stroke was called.  He was deemed not a candidate for TNK as he is on AC.  He was found to have a L M3 occlusion but was deemed not a candidate for thrombectomy as occlusion too distal.    - Repeat CT brain demonstrated moderate-sized evolving acute/subacute L MCA territory infarct but no hemorrhagic conversion.   - neuro following   - Continue ASA and statin   - echo 4/26 : Technically difficult image quality.probably normal based on limited views    - BP, HR stable and controlled per flow sheet   - continue Lopressor, Amio with hold parameters    - Continue Eliquis 2.5  mg BID. Dose reduced in the setting of renal function and age. Previous Cr of 1.4, if his renal function goes back to baseline of 1.4 would place on full dose Eliquis for CVA protection  - Monitor and replete Lytes. Keep K > 4 and Mg > 2    - DC planning to [facility] per primary   - Will continue to follow.

## 2024-05-13 NOTE — PROGRESS NOTE ADULT - SUBJECTIVE AND OBJECTIVE BOX
Date/Time Patient Seen:  		  Referring MD:   Data Reviewed	       Patient is a 84y old  Male who presents with a chief complaint of AFib w/ RVR (12 May 2024 16:44)      Subjective/HPI     PAST MEDICAL & SURGICAL HISTORY:  Diabetes Mellitus, Type II    CAD (Coronary Artery Disease)  s/p 3v CABG 2004; stents placed in [place] in 2019    Dyslipidemia    Asymptomatic Peripheral Vascular Disease    Osteomyelitis    COPD (chronic obstructive pulmonary disease)  on 2L at home and BiPAP at night; intubated 6/18    Diabetes mellitus    Hypertension    PVD (peripheral vascular disease)    History of PAT (paroxysmal atrial tachycardia)    Asthma with COPD    BPH (benign prostatic hyperplasia)    Acute osteomyelitis    CABG (Coronary Artery Bypass Graft)  2004    Compound fracture  left leg    S/P primary angioplasty with coronary stent    H/O drainage of abscess  Left femur 12/2021          Medication list         MEDICATIONS  (STANDING):  albuterol/ipratropium for Nebulization 3 milliLiter(s) Nebulizer every 6 hours  aMIOdarone    Tablet 400  Oral   aMIOdarone    Tablet 200 milliGRAM(s) Oral daily  aspirin  chewable 81 milliGRAM(s) Enteral Tube daily  atorvastatin 80 milliGRAM(s) Oral at bedtime  buDESOnide    Inhalation Suspension 0.5 milliGRAM(s) Inhalation two times a day  ciprofloxacin     Tablet 500 milliGRAM(s) Oral every 12 hours  dextrose 10% Bolus 125 milliLiter(s) IV Bolus once  dextrose 5%. 1000 milliLiter(s) (100 mL/Hr) IV Continuous <Continuous>  dextrose 5%. 1000 milliLiter(s) (50 mL/Hr) IV Continuous <Continuous>  dextrose 5%. 1000 milliLiter(s) (50 mL/Hr) IV Continuous <Continuous>  dextrose 50% Injectable 25 Gram(s) IV Push once  dextrose 50% Injectable 12.5 Gram(s) IV Push once  dronabinol 2.5 milliGRAM(s) Oral two times a day  glucagon  Injectable 1 milliGRAM(s) IntraMuscular once  insulin glargine Injectable (LANTUS) 15 Unit(s) SubCutaneous at bedtime  insulin lispro (ADMELOG) corrective regimen sliding scale   SubCutaneous three times a day before meals  insulin lispro (ADMELOG) corrective regimen sliding scale   SubCutaneous at bedtime  metoprolol tartrate 25 milliGRAM(s) Oral two times a day  montelukast 10 milliGRAM(s) Oral daily  nystatin    Suspension 149688 Unit(s) Oral three times a day    MEDICATIONS  (PRN):  acetaminophen   Oral Liquid .. 650 milliGRAM(s) Oral every 6 hours PRN Temp greater or equal to 38.5C (101.3F)  dextrose Oral Gel 15 Gram(s) Oral once PRN Blood Glucose LESS THAN 70 milliGRAM(s)/deciliter         Vitals log        ICU Vital Signs Last 24 Hrs  T(C): 36.3 (13 May 2024 03:56), Max: 36.6 (12 May 2024 20:31)  T(F): 97.4 (13 May 2024 03:56), Max: 97.8 (12 May 2024 20:31)  HR: 90 (13 May 2024 03:56) (75 - 93)  BP: 129/80 (13 May 2024 03:56) (113/83 - 129/80)  BP(mean): --  ABP: --  ABP(mean): --  RR: 18 (13 May 2024 03:56) (16 - 19)  SpO2: 94% (13 May 2024 03:56) (94% - 98%)    O2 Parameters below as of 13 May 2024 03:56  Patient On (Oxygen Delivery Method): nasal cannula  O2 Flow (L/min): 2               Input and Output:  I&O's Detail    11 May 2024 07:01  -  12 May 2024 07:00  --------------------------------------------------------  IN:  Total IN: 0 mL    OUT:    Incontinent per Condom Catheter (mL): 200 mL  Total OUT: 200 mL    Total NET: -200 mL      12 May 2024 07:01  -  13 May 2024 06:02  --------------------------------------------------------  IN:    dextrose 5%: 300 mL  Total IN: 300 mL    OUT:    Incontinent per Condom Catheter (mL): 400 mL  Total OUT: 400 mL    Total NET: -100 mL          Lab Data                        13.0   7.99  )-----------( 408      ( 12 May 2024 06:20 )             43.0     05-12    149<H>  |  113<H>  |  25<H>  ----------------------------<  142<H>  3.7   |  33<H>  |  1.60<H>    Ca    10.1      12 May 2024 06:20  Phos  2.6     05-12  Mg     2.3     05-12    TPro  6.9  /  Alb  2.9<L>  /  TBili  0.7  /  DBili  x   /  AST  25  /  ALT  22  /  AlkPhos  107  05-12            Review of Systems	      Objective     Physical Examination    heart s1s2  lung dc BS  head nc      Pertinent Lab findings & Imaging      Eliecer:  NO   Adequate UO     I&O's Detail    11 May 2024 07:01  -  12 May 2024 07:00  --------------------------------------------------------  IN:  Total IN: 0 mL    OUT:    Incontinent per Condom Catheter (mL): 200 mL  Total OUT: 200 mL    Total NET: -200 mL      12 May 2024 07:01  -  13 May 2024 06:03  --------------------------------------------------------  IN:    dextrose 5%: 300 mL  Total IN: 300 mL    OUT:    Incontinent per Condom Catheter (mL): 400 mL  Total OUT: 400 mL    Total NET: -100 mL               Discussed with:     Cultures:	        Radiology

## 2024-05-13 NOTE — CONSULT NOTE ADULT - CONSULT REQUESTED DATE/TIME
25-Apr-2024 11:51
27-Apr-2024 22:15
06-May-2024 09:29
09-May-2024 09:35
25-Apr-2024 12:07
29-Apr-2024 11:54
13-May-2024 15:08
25-Apr-2024 15:44
25-Apr-2024 17:50

## 2024-05-13 NOTE — PROGRESS NOTE ADULT - SUBJECTIVE AND OBJECTIVE BOX
Neurology Follow up note    YUE [id number]    HPI:  83yoM PMHx AFib on Eliquis, CAD, HTN, DM, HLD, COPD, osteomyelitis presents c/o shortness of breath, chest discomfort. Pt reports onset of rapid heart rate this morning, HR measured 160s when he checked his pulse ox. Also endorses dyspnea exacerbated by movement. Pt reports last episode of AFib in 2020, no episodes since then until today's presentation. Pt states that chest discomfort feels like chest pressure, no chest pain. Pt also reports chronic R big toe wound for the past few months, follows w/ Wound Care. States that he has increased sensitivity to R sole of foot, but denies pain, numbness/tingling. Denies fevers, chills, abdominal pain, N/V/D/C.     IN THE ED:  Temp 98  F ,  , /81  ,RR 18 , SpO2 94%  S/P PO , IV diltiazem 10mg x2, IV diltiazem gtt @ 10 mg/hr  Labs significant for BUN/Cr 25/1.6, troponin 507.9, pBNP 355  Imaging:   CXR wet read: no gross abnormalities (25 Apr 2024 12:13)      Interval History -no new events    Patient is seen, chart was reviewed and case was discussed with the treatment team.  Pt is not in any distress.   Lying on bed comfortably.     Vital Signs Last 24 Hrs  T(C): 36.8 (13 May 2024 11:24), Max: 36.8 (13 May 2024 11:24)  T(F): 98.2 (13 May 2024 11:24), Max: 98.2 (13 May 2024 11:24)  HR: 95 (13 May 2024 11:24) (75 - 95)  BP: 121/76 (13 May 2024 11:24) (113/83 - 129/80)  BP(mean): --  RR: 19 (13 May 2024 11:24) (18 - 19)  SpO2: 95% (13 May 2024 11:24) (94% - 98%)    Parameters below as of 13 May 2024 11:24  Patient On (Oxygen Delivery Method): room air  O2 Flow (L/min): 2                                              REVIEW OF SYSTEMS:    Constitutional: No fever, weight loss or fatigue  Eyes: No eye pain, visual disturbances, or discharge  ENT:  No difficulty hearing, tinnitus, vertigo; No sinus or throat pain  Neck: No pain or stiffness  Respiratory: No wheezing, chills or hemoptysis  Cardiovascular: No chest pain, palpitations, shortness of breath, dizziness  Gastrointestinal: No abdominal or epigastric pain. No nausea, vomiting or hematemesis  Genitourinary: No dysuria, frequency, hematuria or incontinence  Neurological: No headaches, numbness or tremors  Psychiatric: No depression, anxiety, mood swings or difficulty sleeping  Musculoskeletal: No joint pain or swelling; No muscle, back or extremity pain  Skin: No itching, burning, rashes or lesions   Lymph Nodes: No enlarged glands  Endocrine: No heat or cold intolerance; No hair loss,   Allergy and Immunologic: No hives or eczema    On Neurological Examination:    Mental Status - Pt is alert, awake, oriented X3.. Follows commands well and able to answer questions appropriately.Mood and affect  normal    Speech -  aphasia of mixed type    Cranial Nerves - Pupils 3 mm equal and reactive to light, extraocular eye movements intact. Pt has no visual field deficit.  Pt has right facial weakness  . Facial sensation is intact.Tongue - is in midline.    Muscle tone - is decreased on right  .      Motor Exam -right hemiparesis; RUE 1/5; LE 1-2/5   No shaking or tremors.    Sensory Exam -. Pt withdraws all extremities equally on stimulation. No asymmetry seen. No complaints of tingling, numbness.        coordination:    Finger to nose: normal-left        Deep tendon Reflexes - 2 plus all over.            Neck Supple -  Yes.      MEDICATIONS  (STANDING):    albuterol/ipratropium for Nebulization 3 milliLiter(s) Nebulizer every 6 hours  aMIOdarone    Tablet 400  Oral   aMIOdarone    Tablet 200 milliGRAM(s) Oral daily  apixaban 2.5 milliGRAM(s) Oral every 12 hours  aspirin  chewable 81 milliGRAM(s) Enteral Tube daily  atorvastatin 80 milliGRAM(s) Oral at bedtime  buDESOnide    Inhalation Suspension 0.5 milliGRAM(s) Inhalation two times a day  ciprofloxacin     Tablet 500 milliGRAM(s) Oral every 12 hours  dextrose 10% Bolus 125 milliLiter(s) IV Bolus once  dextrose 5%. 1000 milliLiter(s) (100 mL/Hr) IV Continuous <Continuous>  dextrose 5%. 1000 milliLiter(s) (50 mL/Hr) IV Continuous <Continuous>  dextrose 5%. 1000 milliLiter(s) (50 mL/Hr) IV Continuous <Continuous>  dextrose 50% Injectable 25 Gram(s) IV Push once  dextrose 50% Injectable 12.5 Gram(s) IV Push once  dronabinol 2.5 milliGRAM(s) Oral two times a day  glucagon  Injectable 1 milliGRAM(s) IntraMuscular once  insulin glargine Injectable (LANTUS) 15 Unit(s) SubCutaneous at bedtime  insulin lispro (ADMELOG) corrective regimen sliding scale   SubCutaneous three times a day before meals  insulin lispro (ADMELOG) corrective regimen sliding scale   SubCutaneous at bedtime  metoprolol tartrate 25 milliGRAM(s) Oral two times a day  montelukast 10 milliGRAM(s) Oral daily  nystatin    Suspension 623589 Unit(s) Oral three times a day    MEDICATIONS  (PRN):  acetaminophen   Oral Liquid .. 650 milliGRAM(s) Oral every 6 hours PRN Temp greater or equal to 38.5C (101.3F)  dextrose Oral Gel 15 Gram(s) Oral once PRN Blood Glucose LESS THAN 70 milliGRAM(s)/deciliter              Allergies    No Known Allergies    Intolerances                          05-13    146<H>  |  111<H>  |  23  ----------------------------<  165<H>  3.4<L>   |  33<H>  |  1.60<H>    Ca    9.9      13 May 2024 06:52  Phos  2.6     05-13  Mg     2.1     05-13    TPro  6.4  /  Alb  2.7<L>  /  TBili  0.6  /  DBili  x   /  AST  22  /  ALT  20  /  AlkPhos  101  05-13              Hemoglobin A1C:     Vitamin B12     RADIOLOGY    ASSESSMENT AND PLAN:      Seen for right sided weakness/ams  consistent subacute left mca infarct(missed one dose of apixaban)  also multiple punctate subacute cerebellar infarcts  most likely cardio-embolic    agree with PEG  aspiration precaution  ON AC and asa  continue statin  Physical therapy evaluation.  Pain is accessed and addressed.  Plan of care was discussed with family(wife_. Questions answered.  Would continue to follow.

## 2024-05-13 NOTE — CASE MANAGEMENT PROGRESS NOTE - NSCMPROGRESSNOTE_GEN_ALL_CORE
Discussed pt in rounds, NPO for possible Peg placement today. SW following for return to [facility].

## 2024-05-13 NOTE — CHART NOTE - NSCHARTNOTEFT_GEN_A_CORE
Assessment:   brief history:Reason for Admission: AFib w/ RVR  History of Present Illness:   83yoM PMHx AFib on Eliquis, CAD, HTN, DM, HLD, COPD, osteomyelitis presents c/o shortness of breath, chest discomfort. Pt reports onset of rapid heart rate this morning, HR measured 160s when he checked his pulse ox. Also endorses dyspnea exacerbated by movement. Pt reports last episode of AFib in 2020, no episodes since then until today's presentation. Pt states that chest discomfort feels like chest pressure, no chest pain. Pt also reports chronic R big toe wound for the past few months, follows w/ Wound Care. States that he has increased sensitivity to R sole of foot, but denies pain, numbness/tingling. Denies fevers, chills, abdominal pain, N/V/D/C. s/p code stroke, multiple swallow evaluations since admission, last swallow evaluation 5/11 with recs for pureed, mildly thick liquids.    (5/13) Patient seen for nutrition follow up, chart reviewed. Met with patient and patient's spouse at bedside. Per documentation PO intake remains consistently poor; 26-50% meal consumption on 5/12. Patient NPO for possible PEG placement. Progressive weight loss noted since admission, although there may be some weight discrepancies, as patient has had poor PO intake , likely some weight loss since admission has occurred.     Factors impacting intake: [ ] none [ ] nausea  [ ] vomiting [ ] diarrhea [ ] constipation  [ ]chewing problems [ ] swallowing issues  [ x] other: NPO     Diet Presciption: Diet, NPO after Midnight:      NPO Start Date: 12-May-2024,   NPO Start Time: 23:59 (05-12-24 @ 11:19)  previously consistent CHO no snacks, pureed, Glucerna BID, liquids by teaspoon only    Intake: poor 0-50% per documentation    Current Weight: (5/13) 197.5 pounds, (5/12) 201 pounds (5/11) 202 pounds, dosing 222 pounds, downward trend noted  % Weight Change    Pertinent Medications: MEDICATIONS  (STANDING):  albuterol/ipratropium for Nebulization 3 milliLiter(s) Nebulizer every 6 hours  aMIOdarone    Tablet 400  Oral   aMIOdarone    Tablet 200 milliGRAM(s) Oral daily  aspirin  chewable 81 milliGRAM(s) Enteral Tube daily  atorvastatin 80 milliGRAM(s) Oral at bedtime  buDESOnide    Inhalation Suspension 0.5 milliGRAM(s) Inhalation two times a day  ciprofloxacin     Tablet 500 milliGRAM(s) Oral every 12 hours  dextrose 10% Bolus 125 milliLiter(s) IV Bolus once  dextrose 5%. 1000 milliLiter(s) (100 mL/Hr) IV Continuous <Continuous>  dextrose 5%. 1000 milliLiter(s) (50 mL/Hr) IV Continuous <Continuous>  dextrose 5%. 1000 milliLiter(s) (50 mL/Hr) IV Continuous <Continuous>  dextrose 50% Injectable 25 Gram(s) IV Push once  dextrose 50% Injectable 12.5 Gram(s) IV Push once  dronabinol 2.5 milliGRAM(s) Oral two times a day  glucagon  Injectable 1 milliGRAM(s) IntraMuscular once  insulin glargine Injectable (LANTUS) 15 Unit(s) SubCutaneous at bedtime  insulin lispro (ADMELOG) corrective regimen sliding scale   SubCutaneous three times a day before meals  insulin lispro (ADMELOG) corrective regimen sliding scale   SubCutaneous at bedtime  metoprolol tartrate 25 milliGRAM(s) Oral two times a day  montelukast 10 milliGRAM(s) Oral daily  nystatin    Suspension 378111 Unit(s) Oral three times a day  potassium chloride  10 mEq/100 mL IVPB 10 milliEquivalent(s) IV Intermittent every 1 hour    MEDICATIONS  (PRN):  acetaminophen   Oral Liquid .. 650 milliGRAM(s) Oral every 6 hours PRN Temp greater or equal to 38.5C (101.3F)  dextrose Oral Gel 15 Gram(s) Oral once PRN Blood Glucose LESS THAN 70 milliGRAM(s)/deciliter    Pertinent Labs: 05-13 Na146 mmol/L<H> Glu 165 mg/dL<H> K+ 3.4 mmol/L<L> Cr  1.60 mg/dL<H> BUN 23 mg/dL 05-13 Phos 2.6 mg/dL 05-13 Alb 2.7 g/dL<L> 04-26 Chol 124 mg/dL LDL --    HDL 48 mg/dL Trig 93 mg/dL     CAPILLARY BLOOD GLUCOSE      POCT Blood Glucose.: 140 mg/dL (13 May 2024 08:01)  POCT Blood Glucose.: 166 mg/dL (12 May 2024 21:42)  POCT Blood Glucose.: 157 mg/dL (12 May 2024 17:56)  POCT Blood Glucose.: 166 mg/dL (12 May 2024 16:50)  POCT Blood Glucose.: 167 mg/dL (12 May 2024 13:35)  POCT Blood Glucose.: 209 mg/dL (12 May 2024 12:20)    Skin:     Estimated Needs:   [x ] no change since previous assessment  [ ] recalculated:     Previous Nutrition Diagnosis: inadequate oral intake advanced to malnutrition, swallowing difficulty remains active    [ ] Inadequate Energy Intake [ ]Inadequate Oral Intake [ ] Excessive Energy Intake   [ ] Underweight [ ] Increased Nutrient Needs [ ] Overweight/Obesity   [ ] Altered GI Function [ ] Unintended Weight Loss [ ] Food & Nutrition Related Knowledge Deficit [ ] Malnutrition     Nutrition Diagnosis is [ ] ongoing  [ ] resolved [ ] not applicable     New Nutrition Diagnosis: [ ] not applicable   Patient's previous nutrition dx of inadequate oral intake now advanced to acute/severe malnutrition, related to prolonged inadequate protein energy intake as evidenced by weight loss and persistent poor PO intake < 50% PO intake >1 week.    Interventions:   Recommend  [ x] Change Diet To: as medically feasible, would resume pureed, mildly thick consistent CHO diet  for pleasure feeds, QOL  [ ] Nutrition Supplement  [x ] Nutrition Support: initiate nutrition support as medically feasible/once enteral acccess in place if within GOC. *as patient with prolonged poor PO intake, high risk of refeeding syndrome. Recommend Glucerna 1.5 at 10 cc/hr, increasing by 10 cc q 4-6 hours as tolerated to achieve eventual goal rate of 55 cc/hr x 24 hours to provide 1980 calories, 109 g protein, 1000 cc free water. free water flush 40 cc/hr as tolerated.  [ ] Other:     Monitoring and Evaluation:   [ x] PO intake [ x ] Tolerance to diet prescription [ x ] weights [ x ] labs (potassium, phosphorus, magnesium, and replete aggressively as appropriate)[ x ] follow up per protocol  [ ] other:

## 2024-05-13 NOTE — PROGRESS NOTE ADULT - ASSESSMENT
83-year-old  black male with history of COPD coronary artery disease hypertension diabetes hyperlipidemia atrial fibrillation on Eliquis as well as osteomyelitis who presents now to the emergency department at [facility] complaining of rapid heart rate    jacy  copd  AF  OP  OA  CAD  HTN  DM  HLD  OM hx  CVA     Pureed diet - swallow studies noted - on Marinol - GI eval -   Physiatry following  s/p Nucala for Asthma - as per home rx regimen  s/p CVA tx - CT head repeat noted -   GOC documented - DNR DNI    follows with Dr Ryland ashley med  uses NIV - BIPAP night time for JACY - sleep hygiene reviewed  cardio eval - [facility] rx regimen  BP control  DM care  AF rate and rhythm control  TFT  outpatient record reviewed  proventil PRN - Singulair - copd  spoke with WIFE  wound care

## 2024-05-13 NOTE — PROGRESS NOTE ADULT - PROBLEM SELECTOR PLAN 10
- Eliquis BID    #GOC:  - DNR/DNI  - GI consulted for PEG tube consideration    #Dispo  - SW following, pending [facility] acceptances - Hold eliquis for PEG placement wednesday  - Lovenox 90 mg q12h    #GOC:  - DNR/DNI  - GI consulted for PEG tube consideration    #Dispo  - SW following, pending [facility] acceptances

## 2024-05-13 NOTE — CONSULT NOTE ADULT - PROVIDER SPECIALTY LIST ADULT
Nephrology
Pulmonology
Infectious Disease
Physiatry
Cardiology
Diabetes
Gastroenterology
Critical Care
Podiatry

## 2024-05-13 NOTE — PROGRESS NOTE ADULT - PROBLEM SELECTOR PLAN 4
Chronic, CKD3 baseline Cr 1.1  - Hypernatremic, likely 2/2 poor PO intake  - c/w d5 @ 50cc/hr for 12hr  - Cr stable, continue to monitor  - Continue to hold home furosemide  - Monitor volume status closely  - Nephrology (Dr. Regan) following, recs appreciated

## 2024-05-13 NOTE — DIETITIAN NUTRITION RISK NOTIFICATION - ADDITIONAL COMMENTS/DIETITIAN RECOMMENDATIONS
as medically feasible/within GOC consider initiation of nutrition support. recs in note and flowsheet

## 2024-05-13 NOTE — PROGRESS NOTE ADULT - SUBJECTIVE AND OBJECTIVE BOX
Patient is a 84y old  Male who presents with a chief complaint of AFib w/ RVR (13 May 2024 12:16)      INTERVAL HPI/OVERNIGHT EVENTS: Patient seen and examined at bedside. No overnight events occurred. Pt asleep on arrival however more alert when woken up, and much more responsive during examination. Pt made NPO and AM lovenox held for pending PEG placement, however will resume since no plan for placement today.    MEDICATIONS  (STANDING):  albuterol/ipratropium for Nebulization 3 milliLiter(s) Nebulizer every 6 hours  aMIOdarone    Tablet 400  Oral   aMIOdarone    Tablet 200 milliGRAM(s) Oral daily  aspirin  chewable 81 milliGRAM(s) Enteral Tube daily  atorvastatin 80 milliGRAM(s) Oral at bedtime  buDESOnide    Inhalation Suspension 0.5 milliGRAM(s) Inhalation two times a day  ciprofloxacin     Tablet 500 milliGRAM(s) Oral every 12 hours  dextrose 10% Bolus 125 milliLiter(s) IV Bolus once  dextrose 5%. 1000 milliLiter(s) (100 mL/Hr) IV Continuous <Continuous>  dextrose 5%. 1000 milliLiter(s) (50 mL/Hr) IV Continuous <Continuous>  dextrose 5%. 1000 milliLiter(s) (50 mL/Hr) IV Continuous <Continuous>  dextrose 50% Injectable 25 Gram(s) IV Push once  dextrose 50% Injectable 12.5 Gram(s) IV Push once  dronabinol 2.5 milliGRAM(s) Oral two times a day  glucagon  Injectable 1 milliGRAM(s) IntraMuscular once  insulin glargine Injectable (LANTUS) 15 Unit(s) SubCutaneous at bedtime  insulin lispro (ADMELOG) corrective regimen sliding scale   SubCutaneous three times a day before meals  insulin lispro (ADMELOG) corrective regimen sliding scale   SubCutaneous at bedtime  metoprolol tartrate 25 milliGRAM(s) Oral two times a day  montelukast 10 milliGRAM(s) Oral daily  nystatin    Suspension 498625 Unit(s) Oral three times a day    MEDICATIONS  (PRN):  acetaminophen   Oral Liquid .. 650 milliGRAM(s) Oral every 6 hours PRN Temp greater or equal to 38.5C (101.3F)  dextrose Oral Gel 15 Gram(s) Oral once PRN Blood Glucose LESS THAN 70 milliGRAM(s)/deciliter      Allergies    No Known Allergies    Intolerances        REVIEW OF SYSTEMS:  CONSTITUTIONAL: No fever  HEENT:  No headache  RESPIRATORY: +cough. No shortness of breath  CARDIOVASCULAR: No chest pain  GASTROINTESTINAL: No abd pain    Vital Signs Last 24 Hrs  T(C): 36.8 (13 May 2024 11:24), Max: 36.8 (13 May 2024 11:24)  T(F): 98.2 (13 May 2024 11:24), Max: 98.2 (13 May 2024 11:24)  HR: 95 (13 May 2024 11:24) (75 - 95)  BP: 121/76 (13 May 2024 11:24) (113/83 - 129/80)  BP(mean): --  RR: 19 (13 May 2024 11:24) (18 - 19)  SpO2: 95% (13 May 2024 11:24) (94% - 98%)    Parameters below as of 13 May 2024 11:24  Patient On (Oxygen Delivery Method): room air  O2 Flow (L/min): 2      PHYSICAL EXAM:  GENERAL: NAD,somnolent  HEENT:  +thrush. PERLLA  CHEST/LUNG:  +coarse breath sounds b/l. No accessory muscle use  HEART:  RRR, S1, S2. No murmur  ABDOMEN:  BS+, soft, ND  EXTREMITIES: no edema  NERVOUS SYSTEM: +aphasia, nods to answer questions. Following commands. +RUE hemiparesis, able to wiggle toes in RLE. Full strength in b/l LUE and LLE    LABS:                        12.6   7.22  )-----------( 371      ( 13 May 2024 06:52 )             41.8     CBC Full  -  ( 13 May 2024 06:52 )  WBC Count : 7.22 K/uL  Hemoglobin : 12.6 g/dL  Hematocrit : 41.8 %  Platelet Count - Automated : 371 K/uL  Mean Cell Volume : 88.7 fl  Mean Cell Hemoglobin : 26.8 pg  Mean Cell Hemoglobin Concentration : 30.1 gm/dL  Auto Neutrophil # : x  Auto Lymphocyte # : x  Auto Monocyte # : x  Auto Eosinophil # : x  Auto Basophil # : x  Auto Neutrophil % : x  Auto Lymphocyte % : x  Auto Monocyte % : x  Auto Eosinophil % : x  Auto Basophil % : x    13 May 2024 06:52    146    |  111    |  23     ----------------------------<  165    3.4     |  33     |  1.60     Ca    9.9        13 May 2024 06:52  Phos  2.6       13 May 2024 06:52  Mg     2.1       13 May 2024 06:52    TPro  6.4    /  Alb  2.7    /  TBili  0.6    /  DBili  x      /  AST  22     /  ALT  20     /  AlkPhos  101    13 May 2024 06:52      Urinalysis Basic - ( 13 May 2024 06:52 )    Color: x / Appearance: x / SG: x / pH: x  Gluc: 165 mg/dL / Ketone: x  / Bili: x / Urobili: x   Blood: x / Protein: x / Nitrite: x   Leuk Esterase: x / RBC: x / WBC x   Sq Epi: x / Non Sq Epi: x / Bacteria: x      CAPILLARY BLOOD GLUCOSE      POCT Blood Glucose.: 118 mg/dL (13 May 2024 11:50)  POCT Blood Glucose.: 140 mg/dL (13 May 2024 08:01)  POCT Blood Glucose.: 166 mg/dL (12 May 2024 21:42)  POCT Blood Glucose.: 157 mg/dL (12 May 2024 17:56)  POCT Blood Glucose.: 166 mg/dL (12 May 2024 16:50)        Personally reviewed.     Consultant(s) Notes Reviewed:  [x] YES  [ ] NO     Patient is a 84y old  Male who presents with a chief complaint of AFib w/ RVR (13 May 2024 12:16)      INTERVAL HPI/OVERNIGHT EVENTS: Patient seen and examined at bedside. No overnight events occurred. Pt asleep on arrival however more alert when woken up, and much more responsive during examination. Pt made NPO and AM eliquis held for pending PEG placement, however will resume since no plan for placement today.    MEDICATIONS  (STANDING):  albuterol/ipratropium for Nebulization 3 milliLiter(s) Nebulizer every 6 hours  aMIOdarone    Tablet 400  Oral   aMIOdarone    Tablet 200 milliGRAM(s) Oral daily  aspirin  chewable 81 milliGRAM(s) Enteral Tube daily  atorvastatin 80 milliGRAM(s) Oral at bedtime  buDESOnide    Inhalation Suspension 0.5 milliGRAM(s) Inhalation two times a day  ciprofloxacin     Tablet 500 milliGRAM(s) Oral every 12 hours  dextrose 10% Bolus 125 milliLiter(s) IV Bolus once  dextrose 5%. 1000 milliLiter(s) (100 mL/Hr) IV Continuous <Continuous>  dextrose 5%. 1000 milliLiter(s) (50 mL/Hr) IV Continuous <Continuous>  dextrose 5%. 1000 milliLiter(s) (50 mL/Hr) IV Continuous <Continuous>  dextrose 50% Injectable 25 Gram(s) IV Push once  dextrose 50% Injectable 12.5 Gram(s) IV Push once  dronabinol 2.5 milliGRAM(s) Oral two times a day  glucagon  Injectable 1 milliGRAM(s) IntraMuscular once  insulin glargine Injectable (LANTUS) 15 Unit(s) SubCutaneous at bedtime  insulin lispro (ADMELOG) corrective regimen sliding scale   SubCutaneous three times a day before meals  insulin lispro (ADMELOG) corrective regimen sliding scale   SubCutaneous at bedtime  metoprolol tartrate 25 milliGRAM(s) Oral two times a day  montelukast 10 milliGRAM(s) Oral daily  nystatin    Suspension [id number] Unit(s) Oral three times a day    MEDICATIONS  (PRN):  acetaminophen   Oral Liquid .. 650 milliGRAM(s) Oral every 6 hours PRN Temp greater or equal to 38.5C (101.3F)  dextrose Oral Gel 15 Gram(s) Oral once PRN Blood Glucose LESS THAN 70 milliGRAM(s)/deciliter      Allergies    No Known Allergies    Intolerances        REVIEW OF SYSTEMS:  CONSTITUTIONAL: No fever  HEENT:  No headache  RESPIRATORY: +cough. No shortness of breath  CARDIOVASCULAR: No chest pain  GASTROINTESTINAL: No abd pain    Vital Signs Last 24 Hrs  T(C): 36.8 (13 May 2024 11:24), Max: 36.8 (13 May 2024 11:24)  T(F): 98.2 (13 May 2024 11:24), Max: 98.2 (13 May 2024 11:24)  HR: 95 (13 May 2024 11:24) (75 - 95)  BP: 121/76 (13 May 2024 11:24) (113/83 - 129/80)  BP(mean): --  RR: 19 (13 May 2024 11:24) (18 - 19)  SpO2: 95% (13 May 2024 11:24) (94% - 98%)    Parameters below as of 13 May 2024 11:24  Patient On (Oxygen Delivery Method): room air  O2 Flow (L/min): 2      PHYSICAL EXAM:  GENERAL: NAD,somnolent  HEENT:  +thrush. PERLLA  CHEST/LUNG:  +coarse breath sounds b/l. No accessory muscle use  HEART:  RRR, S1, S2. No murmur  ABDOMEN:  BS+, soft, ND  EXTREMITIES: no edema  NERVOUS SYSTEM: +aphasia, nods to answer questions. Following commands. +RUE hemiparesis, able to wiggle toes in RLE. Full strength in b/l LUE and LLE    LABS:                        12.6   7.22  )-----------( 371      ( 13 May 2024 06:52 )             41.8     CBC Full  -  ( 13 May 2024 06:52 )  WBC Count : 7.22 K/uL  Hemoglobin : 12.6 g/dL  Hematocrit : 41.8 %  Platelet Count - Automated : 371 K/uL  Mean Cell Volume : 88.7 fl  Mean Cell Hemoglobin : 26.8 pg  Mean Cell Hemoglobin Concentration : 30.1 gm/dL  Auto Neutrophil # : x  Auto Lymphocyte # : x  Auto Monocyte # : x  Auto Eosinophil # : x  Auto Basophil # : x  Auto Neutrophil % : x  Auto Lymphocyte % : x  Auto Monocyte % : x  Auto Eosinophil % : x  Auto Basophil % : x    13 May 2024 06:52    146    |  111    |  23     ----------------------------<  165    3.4     |  33     |  1.60     Ca    9.9        13 May 2024 06:52  Phos  2.6       13 May 2024 06:52  Mg     2.1       13 May 2024 06:52    TPro  6.4    /  Alb  2.7    /  TBili  0.6    /  DBili  x      /  AST  22     /  ALT  20     /  AlkPhos  101    13 May 2024 06:52      Urinalysis Basic - ( 13 May 2024 06:52 )    Color: x / Appearance: x / SG: x / pH: x  Gluc: 165 mg/dL / Ketone: x  / Bili: x / Urobili: x   Blood: x / Protein: x / Nitrite: x   Leuk Esterase: x / RBC: x / WBC x   Sq Epi: x / Non Sq Epi: x / Bacteria: x      CAPILLARY BLOOD GLUCOSE      POCT Blood Glucose.: 118 mg/dL (13 May 2024 11:50)  POCT Blood Glucose.: 140 mg/dL (13 May 2024 08:01)  POCT Blood Glucose.: 166 mg/dL (12 May 2024 21:42)  POCT Blood Glucose.: 157 mg/dL (12 May 2024 17:56)  POCT Blood Glucose.: 166 mg/dL (12 May 2024 16:50)        Personally reviewed.     Consultant(s) Notes Reviewed:  [x] YES  [ ] NO

## 2024-05-13 NOTE — DIETITIAN NUTRITION RISK NOTIFICATION - TREATMENT: THE FOLLOWING DIET HAS BEEN RECOMMENDED
Diet, NPO after Midnight:      NPO Start Date: 12-May-2024,   NPO Start Time: 23:59 (05-12-24 @ 11:19) [Active]

## 2024-05-13 NOTE — CONSULT NOTE ADULT - SUBJECTIVE AND OBJECTIVE BOX
[facility]    Edenilson Ebony Vega NP    [address] [place] [address] [phone]      Chief Complaint:  Patient is a 84y old  Male who presents with a chief complaint of AFib w/ RVR (13 May 2024 15:05)      HPI:83yoM PMHx AFib on Eliquis, CAD, HTN, DM, HLD, COPD, osteomyelitis presents c/o shortness of breath, chest discomfort. Admitted for AFib w/ RVR. Hospital course c/b CVA on 2024. 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     Allergies:  No Known Allergies      Medications:  acetaminophen   Oral Liquid .. 650 milliGRAM(s) Oral every 6 hours PRN  albuterol/ipratropium for Nebulization 3 milliLiter(s) Nebulizer every 6 hours  aMIOdarone    Tablet 400  Oral   aMIOdarone    Tablet 200 milliGRAM(s) Oral daily  apixaban 2.5 milliGRAM(s) Oral every 12 hours  aspirin  chewable 81 milliGRAM(s) Enteral Tube daily  atorvastatin 80 milliGRAM(s) Oral at bedtime  buDESOnide    Inhalation Suspension 0.5 milliGRAM(s) Inhalation two times a day  ciprofloxacin     Tablet 500 milliGRAM(s) Oral every 12 hours  dextrose 10% Bolus 125 milliLiter(s) IV Bolus once  dextrose 5%. 1000 milliLiter(s) IV Continuous <Continuous>  dextrose 5%. 1000 milliLiter(s) IV Continuous <Continuous>  dextrose 5%. 1000 milliLiter(s) IV Continuous <Continuous>  dextrose 50% Injectable 25 Gram(s) IV Push once  dextrose 50% Injectable 12.5 Gram(s) IV Push once  dextrose Oral Gel 15 Gram(s) Oral once PRN  dronabinol 2.5 milliGRAM(s) Oral two times a day  glucagon  Injectable 1 milliGRAM(s) IntraMuscular once  insulin glargine Injectable (LANTUS) 15 Unit(s) SubCutaneous at bedtime  insulin lispro (ADMELOG) corrective regimen sliding scale   SubCutaneous three times a day before meals  insulin lispro (ADMELOG) corrective regimen sliding scale   SubCutaneous at bedtime  metoprolol tartrate 25 milliGRAM(s) Oral two times a day  montelukast 10 milliGRAM(s) Oral daily  nystatin    Suspension [id number] Unit(s) Oral three times a day      PMHX/PSHX:  Diabetes Mellitus, Type II    CAD (Coronary Artery Disease)    Dyslipidemia    Asymptomatic Peripheral Vascular Disease    Osteomyelitis    COPD (chronic obstructive pulmonary disease)    Diabetes mellitus    Hypertension    PVD (peripheral vascular disease)    History of PAT (paroxysmal atrial tachycardia)    Asthma with COPD    BPH (benign prostatic hyperplasia)    Acute osteomyelitis    CABG (Coronary Artery Bypass Graft)    Compound fracture    S/P primary angioplasty with coronary stent    H/O drainage of abscess        Family history:  Family history of diabetes mellitus (Sibling)    Family hx of lung cancer        Social History:     ROS:     unable to obtain        PHYSICAL EXAM:   Vital Signs:  Vital Signs Last 24 Hrs  T(C): 36.8 (13 May 2024 11:24), Max: 36.8 (13 May 2024 11:24)  T(F): 98.2 (13 May 2024 11:24), Max: 98.2 (13 May 2024 11:24)  HR: 95 (13 May 2024 11:24) (75 - 95)  BP: 121/76 (13 May 2024 11:24) (113/83 - 129/80)  BP(mean): --  RR: 19 (13 May 2024 11:24) (18 - 19)  SpO2: 95% (13 May 2024 11:24) (94% - 98%)    Parameters below as of 13 May 2024 11:24  Patient On (Oxygen Delivery Method): room air  O2 Flow (L/min): 2    Daily     Daily Weight in k.6 (13 May 2024 02:53)    nad  confused  frail  non toxic  soft, nt  no edema      LABS:                        12.6   7.22  )-----------( 371      ( 13 May 2024 06:52 )             41.8     05-13    146<H>  |  111<H>  |  23  ----------------------------<  165<H>  3.4<L>   |  33<H>  |  1.60<H>    Ca    9.9      13 May 2024 06:52  Phos  2.6     05-13  Mg     2.1     05-13    TPro  6.4  /  Alb  2.7<L>  /  TBili  0.6  /  DBili  x   /  AST  22  /  ALT  20  /  AlkPhos  101  05-13    LIVER FUNCTIONS - ( 13 May 2024 06:52 )  Alb: 2.7 g/dL / Pro: 6.4 g/dL / ALK PHOS: 101 U/L / ALT: 20 U/L / AST: 22 U/L / GGT: x             Urinalysis Basic - ( 13 May 2024 06:52 )    Color: x / Appearance: x / SG: x / pH: x  Gluc: 165 mg/dL / Ketone: x  / Bili: x / Urobili: x   Blood: x / Protein: x / Nitrite: x   Leuk Esterase: x / RBC: x / WBC x   Sq Epi: x / Non Sq Epi: x / Bacteria: x          Imaging:

## 2024-05-13 NOTE — CONSULT NOTE ADULT - CONSULT REASON
acute cva
Afib w/ rvr
Right hallux ulcer
R 1st toe wound
MERRILL, Hypernatremia
Recs on dispo/acute rehab candidacy
COPD
peg eval
83y A1C with Estimated Average Glucose Result: 6.8 % (04-26-24 @ 08:05)   diabetes mellitus uncontrolled type 2

## 2024-05-13 NOTE — CONSULT NOTE ADULT - ASSESSMENT
dysphagia  h/o CVA    chart noted  passed s/s however unable to consume adequate PO  hold ac  family and patient would like to move forward with peg   tentative peg wednesday

## 2024-05-14 NOTE — PROGRESS NOTE ADULT - ASSESSMENT
dysphagia  h/o CVA    passed s/s but poor PO intake  pt and family wish to pursue peg  planned for egd/peg tomorrow  NPO after midnight  will follow    I reviewed the overnight course of events on the unit, re-confirming the patient history. I discussed the care with the patient  Differential diagnosis and plan of care discussed with patient after the evaluation  35 minutes spent on total encounter of which more than fifty percent of the encounter was spent counseling and/or coordinating care by the attending physician.

## 2024-05-14 NOTE — PROGRESS NOTE ADULT - SUBJECTIVE AND OBJECTIVE BOX
Patient is a 83y old  Male who presents with a chief complaint of AFib w/ RVR (07 May 2024 08:54)  Patient seen in follow up for hypernatremia, MERRILL.        PAST MEDICAL HISTORY:  Diabetes Mellitus, Type II    CAD (Coronary Artery Disease)    Dyslipidemia    Asymptomatic Peripheral Vascular Disease    Osteomyelitis    COPD (chronic obstructive pulmonary disease)    Diabetes mellitus    Hypertension    PVD (peripheral vascular disease)    History of PAT (paroxysmal atrial tachycardia)    Asthma with COPD    BPH (benign prostatic hyperplasia)    Acute osteomyelitis      MEDICATIONS  (STANDING):  albuterol/ipratropium for Nebulization 3 milliLiter(s) Nebulizer every 6 hours  aMIOdarone    Tablet 400  Oral   aMIOdarone    Tablet 200 milliGRAM(s) Oral daily  aspirin  chewable 81 milliGRAM(s) Enteral Tube daily  atorvastatin 80 milliGRAM(s) Oral at bedtime  buDESOnide    Inhalation Suspension 0.5 milliGRAM(s) Inhalation two times a day  ciprofloxacin     Tablet 500 milliGRAM(s) Oral every 12 hours  dextrose 10% Bolus 125 milliLiter(s) IV Bolus once  dextrose 5%. 1000 milliLiter(s) (75 mL/Hr) IV Continuous <Continuous>  dextrose 5%. 1000 milliLiter(s) (50 mL/Hr) IV Continuous <Continuous>  dextrose 5%. 1000 milliLiter(s) (100 mL/Hr) IV Continuous <Continuous>  dextrose 50% Injectable 25 Gram(s) IV Push once  dextrose 50% Injectable 12.5 Gram(s) IV Push once  dronabinol 2.5 milliGRAM(s) Oral two times a day  enoxaparin Injectable 90 milliGRAM(s) SubCutaneous every 12 hours  glucagon  Injectable 1 milliGRAM(s) IntraMuscular once  insulin glargine Injectable (LANTUS) 15 Unit(s) SubCutaneous at bedtime  insulin lispro (ADMELOG) corrective regimen sliding scale   SubCutaneous three times a day before meals  insulin lispro (ADMELOG) corrective regimen sliding scale   SubCutaneous at bedtime  metoprolol tartrate 25 milliGRAM(s) Oral two times a day  montelukast 10 milliGRAM(s) Oral daily  nystatin    Suspension [id number] Unit(s) Oral three times a day    MEDICATIONS  (PRN):  acetaminophen   Oral Liquid .. 650 milliGRAM(s) Oral every 6 hours PRN Temp greater or equal to 38.5C (101.3F)  dextrose Oral Gel 15 Gram(s) Oral once PRN Blood Glucose LESS THAN 70 milliGRAM(s)/deciliter    T(C): 36.4 (05-14-24 @ 11:32), Max: 36.8 (05-13-24 @ 11:24)  HR: 89 (05-14-24 @ 11:32) (75 - 95)  BP: 132/77 (05-14-24 @ 11:32) (113/83 - 137/68)  RR: 19 (05-14-24 @ 11:32)  SpO2: 96% (05-14-24 @ 11:32)  Wt(kg): --  I&O's Detail    13 May 2024 07:01  -  14 May 2024 07:00  --------------------------------------------------------  IN:    dextrose 5%: 680 mL  Total IN: 680 mL    OUT:    Incontinent per Condom Catheter (mL): 1500 mL  Total OUT: 1500 mL    Total NET: -820 mL                      PHYSICAL EXAM:  General: No distress  Respiratory: b/l air entry  Cardiovascular: S1 S2  Gastrointestinal: soft  Extremities:  + edema                       LABORATORY:                        11.6   6.09  )-----------( 318      ( 14 May 2024 08:03 )             39.4     05-14    146<H>  |  111<H>  |  21  ----------------------------<  202<H>  3.5   |  32<H>  |  1.50<H>    Ca    9.5      14 May 2024 08:03  Phos  2.6     05-14  Mg     2.0     05-14    TPro  6.4  /  Alb  2.7<L>  /  TBili  0.6  /  DBili  x   /  AST  22  /  ALT  20  /  AlkPhos  101  05-13    Sodium: 146 mmol/L (05-14 @ 08:03)  Sodium: 146 mmol/L (05-13 @ 06:52)    Potassium: 3.5 mmol/L (05-14 @ 08:03)  Potassium: 3.4 mmol/L (05-13 @ 06:52)    Hemoglobin: 11.6 g/dL (05-14 @ 08:03)  Hemoglobin: 12.6 g/dL (05-13 @ 06:52)  Hemoglobin: 13.0 g/dL (05-12 @ 06:20)    Creatinine, Serum 1.50 (05-14 @ 08:03)  Creatinine, Serum 1.60 (05-13 @ 06:52)  Creatinine, Serum 1.60 (05-12 @ 06:20)        LIVER FUNCTIONS - ( 13 May 2024 06:52 )  Alb: 2.7 g/dL / Pro: 6.4 g/dL / ALK PHOS: 101 U/L / ALT: 20 U/L / AST: 22 U/L / GGT: x           Urinalysis Basic - ( 14 May 2024 08:03 )    Color: x / Appearance: x / SG: x / pH: x  Gluc: 202 mg/dL / Ketone: x  / Bili: x / Urobili: x   Blood: x / Protein: x / Nitrite: x   Leuk Esterase: x / RBC: x / WBC x   Sq Epi: x / Non Sq Epi: x / Bacteria: x

## 2024-05-14 NOTE — PROGRESS NOTE ADULT - SUBJECTIVE AND OBJECTIVE BOX
Patient is a 84y old  Male who presents with a chief complaint of AFib w/ RVR (14 May 2024 05:45)      INTERVAL HPI/OVERNIGHT EVENTS: Patient seen and examined at bedside. No overnight events occurred. Pt appears in a good mood today, smiling during conversation. Still unable to articulate/verbalize, but comprehension seems intact and nods to answer questions. Plan for PEG placement tomorrow.    MEDICATIONS  (STANDING):  albuterol/ipratropium for Nebulization 3 milliLiter(s) Nebulizer every 6 hours  aMIOdarone    Tablet 400  Oral   aMIOdarone    Tablet 200 milliGRAM(s) Oral daily  aspirin  chewable 81 milliGRAM(s) Enteral Tube daily  atorvastatin 80 milliGRAM(s) Oral at bedtime  buDESOnide    Inhalation Suspension 0.5 milliGRAM(s) Inhalation two times a day  ciprofloxacin     Tablet 500 milliGRAM(s) Oral every 12 hours  dextrose 10% Bolus 125 milliLiter(s) IV Bolus once  dextrose 5%. 1000 milliLiter(s) (100 mL/Hr) IV Continuous <Continuous>  dextrose 5%. 1000 milliLiter(s) (50 mL/Hr) IV Continuous <Continuous>  dextrose 5%. 1000 milliLiter(s) (50 mL/Hr) IV Continuous <Continuous>  dextrose 50% Injectable 25 Gram(s) IV Push once  dextrose 50% Injectable 12.5 Gram(s) IV Push once  dronabinol 2.5 milliGRAM(s) Oral two times a day  enoxaparin Injectable 90 milliGRAM(s) SubCutaneous every 12 hours  glucagon  Injectable 1 milliGRAM(s) IntraMuscular once  insulin glargine Injectable (LANTUS) 15 Unit(s) SubCutaneous at bedtime  insulin lispro (ADMELOG) corrective regimen sliding scale   SubCutaneous at bedtime  insulin lispro (ADMELOG) corrective regimen sliding scale   SubCutaneous three times a day before meals  metoprolol tartrate 25 milliGRAM(s) Oral two times a day  montelukast 10 milliGRAM(s) Oral daily  nystatin    Suspension 342249 Unit(s) Oral three times a day    MEDICATIONS  (PRN):  acetaminophen   Oral Liquid .. 650 milliGRAM(s) Oral every 6 hours PRN Temp greater or equal to 38.5C (101.3F)  dextrose Oral Gel 15 Gram(s) Oral once PRN Blood Glucose LESS THAN 70 milliGRAM(s)/deciliter      Allergies    No Known Allergies    Intolerances        REVIEW OF SYSTEMS:  CONSTITUTIONAL: No fever  HEENT:  No headache  RESPIRATORY: No shortness of breath  CARDIOVASCULAR: No chest pain  GASTROINTESTINAL: No abd pain    Vital Signs Last 24 Hrs  T(C): 36.6 (14 May 2024 04:56), Max: 36.8 (13 May 2024 11:24)  T(F): 97.9 (14 May 2024 04:56), Max: 98.2 (13 May 2024 11:24)  HR: 76 (14 May 2024 08:03) (76 - 95)  BP: 137/68 (14 May 2024 04:56) (114/67 - 137/68)  BP(mean): --  RR: 18 (14 May 2024 04:56) (18 - 19)  SpO2: 95% (14 May 2024 08:03) (94% - 99%)    Parameters below as of 14 May 2024 08:03  Patient On (Oxygen Delivery Method): nasal cannula        PHYSICAL EXAM:  GENERAL: NAD, laying in bed, smiling  HEENT:  +thrush, dried secretions on lips.   CHEST/LUNG:  +coarse breath sounds L>R. No accessory muscle use  HEART:  +irregular rhythm. S1, S2  ABDOMEN:  BS+, soft, NT/ND  EXTREMITIES: Trace edema b/l  NERVOUS SYSTEM: +aphasia. +R sided hemiparesis. Follows commands.     LABS:    CBC Full  -  ( 13 May 2024 06:52 )  WBC Count : 7.22 K/uL  Hemoglobin : 12.6 g/dL  Hematocrit : 41.8 %  Platelet Count - Automated : 371 K/uL  Mean Cell Volume : 88.7 fl  Mean Cell Hemoglobin : 26.8 pg  Mean Cell Hemoglobin Concentration : 30.1 gm/dL  Auto Neutrophil # : x  Auto Lymphocyte # : x  Auto Monocyte # : x  Auto Eosinophil # : x  Auto Basophil # : x  Auto Neutrophil % : x  Auto Lymphocyte % : x  Auto Monocyte % : x  Auto Eosinophil % : x  Auto Basophil % : x      Ca    9.9        13 May 2024 06:52        Urinalysis Basic - ( 13 May 2024 06:52 )    Color: x / Appearance: x / SG: x / pH: x  Gluc: 165 mg/dL / Ketone: x  / Bili: x / Urobili: x   Blood: x / Protein: x / Nitrite: x   Leuk Esterase: x / RBC: x / WBC x   Sq Epi: x / Non Sq Epi: x / Bacteria: x      CAPILLARY BLOOD GLUCOSE      POCT Blood Glucose.: 160 mg/dL (14 May 2024 07:46)  POCT Blood Glucose.: 160 mg/dL (13 May 2024 20:56)  POCT Blood Glucose.: 180 mg/dL (13 May 2024 16:57)  POCT Blood Glucose.: 118 mg/dL (13 May 2024 11:50)      Personally reviewed.     Consultant(s) Notes Reviewed:  [x] YES  [ ] NO

## 2024-05-14 NOTE — PROGRESS NOTE ADULT - ATTENDING COMMENTS
Patient was seen and examined by myself. Case was discussed with house staff in details. I have reviewed and agree with the plan as outlined above with edits where appropriate.    HPI:  83yoM PMHx AFib on Eliquis, CAD, HTN, DM, HLD, COPD, osteomyelitis presents c/o shortness of breath, chest discomfort. Pt reports onset of rapid heart rate this morning, HR measured 160s when he checked his pulse ox. Also endorses dyspnea exacerbated by movement. Pt reports last episode of AFib in 2020, no episodes since then until today's presentation. Pt states that chest discomfort feels like chest pressure, no chest pain. Pt also reports chronic R big toe wound for the past few months, follows w/ [facility]. States that he has increased sensitivity to R sole of foot, but denies pain, numbness/tingling. Denies fevers, chills, abdominal pain, N/V/D/C.     IN THE ED:  Temp 98  F ,  , /81  ,RR 18 , SpO2 94%  S/P PO , IV diltiazem 10mg x2, IV diltiazem gtt @ 10 mg/hr  Labs significant for BUN/Cr 25/1.6, troponin 507.9, pBNP 355  Imaging:   CXR wet read: no gross abnormalities (25 Apr 2024 12:13)      ROS: as in the HPI; all other ROS negative    SH and family history as above    Vital Signs Last 24 Hrs, stable         GEN: NAD, more awake and alert   HEENT- normocephalic; mouth moist, right facial drop, oral thrush   CVS- S1S2+, irregular   LUNGS- clear to auscultation; no wheezing  ABD: Soft , nontender, nondistended, Bowel sounds are present  EXTREMITY: trace Ankle swelling B/L   NEURO: AA; slurred speech, dysarthria, Right side weakness gross motor 1/5, Left  3/5   PSYCH: follows commend better today    BACK: no swelling or mass;   VASCULAR: distal peripheral pulses present  SKIN: warm and dry.       Labs and imaging reviewed      Patient presenting with Patient is a 83y old  Male who presents with a chief complaint of AFib w/ RVR (08 May 2024 16:50)   admitted for    A fib with RVR: now on eliquis and Amiodarone/lopressor, cardio eval noted, rate controlled   CVA: ASA, statin, PT/OT, BP/Afib management , repeat CTH similar finding. discussed with physiatry. acute VS subacute rehab.   dysphagia: purred, S/S re eval noted , aspiration precaution, wife states his oral intake is very limited. nutritional supplement added, 25%-50% oral intake as per nutrition monitoring, Hypernatremia likley due to decreased free water intake.   Hypernatremia: with low urine out put, increased D5 IVF  MERRILL: BUN/Cr level elevated but stable , IVF   DM : lantus with RISS , FS goal 100-180    oral thrush: nystatin S/S     patient Is medically optimized for peg placement tomorrow         Plan of care discussed with patient and wife at bedside  ;  all questions and concerns were addressed.

## 2024-05-14 NOTE — PROGRESS NOTE ADULT - PROBLEM SELECTOR PLAN 1
MRI Head: Posterior left MCA vascular ischemic stroke. No hemorrhagic conversion, repeat CTs w/ no acute change  - Most likely cardio-embolic  - C/w ASA and lipitor qd  - Hold eliquis for PEG placement tomorrow  - Somnolence can be 2/2 poor PO intake, plan for PEG tomorrow  - c/w marinol 2.5mg BID   - c/w neuro checks and fall/aspiration precautions   - Physiatry following, recs appreciated  - Neuro (Dr. Underwood) following, recs appreciated

## 2024-05-14 NOTE — PROGRESS NOTE ADULT - SUBJECTIVE AND OBJECTIVE BOX
[facility]  Rich Sky PA-C  [address] [place] [address]  [phone]      INTERVAL HPI/OVERNIGHT EVENTS:  Pt s/e with wife at bedside  No GI complaints   Discussed plan for peg tomorrow    MEDICATIONS  (STANDING):  albuterol/ipratropium for Nebulization 3 milliLiter(s) Nebulizer every 6 hours  aMIOdarone    Tablet 400  Oral   aMIOdarone    Tablet 200 milliGRAM(s) Oral daily  aspirin  chewable 81 milliGRAM(s) Enteral Tube daily  atorvastatin 80 milliGRAM(s) Oral at bedtime  buDESOnide    Inhalation Suspension 0.5 milliGRAM(s) Inhalation two times a day  ciprofloxacin     Tablet 500 milliGRAM(s) Oral every 12 hours  dextrose 10% Bolus 125 milliLiter(s) IV Bolus once  dextrose 5%. 1000 milliLiter(s) (75 mL/Hr) IV Continuous <Continuous>  dextrose 5%. 1000 milliLiter(s) (100 mL/Hr) IV Continuous <Continuous>  dextrose 5%. 1000 milliLiter(s) (50 mL/Hr) IV Continuous <Continuous>  dextrose 50% Injectable 12.5 Gram(s) IV Push once  dextrose 50% Injectable 25 Gram(s) IV Push once  dronabinol 2.5 milliGRAM(s) Oral two times a day  enoxaparin Injectable 90 milliGRAM(s) SubCutaneous every 12 hours  glucagon  Injectable 1 milliGRAM(s) IntraMuscular once  insulin glargine Injectable (LANTUS) 15 Unit(s) SubCutaneous at bedtime  insulin lispro (ADMELOG) corrective regimen sliding scale   SubCutaneous at bedtime  insulin lispro (ADMELOG) corrective regimen sliding scale   SubCutaneous three times a day before meals  metoprolol tartrate 25 milliGRAM(s) Oral two times a day  montelukast 10 milliGRAM(s) Oral daily  nystatin    Suspension [id number] Unit(s) Oral three times a day    MEDICATIONS  (PRN):  acetaminophen   Oral Liquid .. 650 milliGRAM(s) Oral every 6 hours PRN Temp greater or equal to 38.5C (101.3F)  dextrose Oral Gel 15 Gram(s) Oral once PRN Blood Glucose LESS THAN 70 milliGRAM(s)/deciliter      Allergies    No Known Allergies      PHYSICAL EXAM:   Vital Signs:  Vital Signs Last 24 Hrs  T(C): 36.4 (14 May 2024 11:32), Max: 36.7 (13 May 2024 20:20)  T(F): 97.6 (14 May 2024 11:32), Max: 98 (13 May 2024 20:20)  HR: 89 (14 May 2024 11:32) (76 - 92)  BP: 132/77 (14 May 2024 11:32) (114/67 - 137/68)  BP(mean): --  RR: 19 (14 May 2024 11:32) (18 - 19)  SpO2: 96% (14 May 2024 11:32) (94% - 99%)    Parameters below as of 14 May 2024 11:32  Patient On (Oxygen Delivery Method): nasal cannula  O2 Flow (L/min): 2    Daily     Daily Weight in k.6 (14 May 2024 04:56)    GENERAL:  Appears stated age  HEENT:  NC/AT  CHEST:  Full & symmetric excursion  HEART:  Regular rhythm  ABDOMEN:  Soft, non-tender, non-distended  EXTEREMITIES:  no cyanosis  SKIN:  No rash  NEURO:  Alert      LABS:                        11.6   6.09  )-----------( 318      ( 14 May 2024 08:03 )             39.4     05-14    146<H>  |  111<H>  |  21  ----------------------------<  202<H>  3.5   |  32<H>  |  1.50<H>    Ca    9.5      14 May 2024 08:03  Phos  2.6     05-14  Mg     2.0     05-14    TPro  6.4  /  Alb  2.7<L>  /  TBili  0.6  /  DBili  x   /  AST  22  /  ALT  20  /  AlkPhos  101  05-13    PT/INR - ( 14 May 2024 08:03 )   PT: 13.7 sec;   INR: 1.18 ratio         PTT - ( 14 May 2024 08:03 )  PTT:39.1 sec  Urinalysis Basic - ( 14 May 2024 08:03 )    Color: x / Appearance: x / SG: x / pH: x  Gluc: 202 mg/dL / Ketone: x  / Bili: x / Urobili: x   Blood: x / Protein: x / Nitrite: x   Leuk Esterase: x / RBC: x / WBC x   Sq Epi: x / Non Sq Epi: x / Bacteria: x

## 2024-05-14 NOTE — PROGRESS NOTE ADULT - ASSESSMENT
83-year-old M with history of COPD on home O2 PRN, coronary artery disease s/p CABG,  hypertension, diabetes, hyperlipidemia, atrial fibrillation on Eliquis as well as chronic osteomyelitis who presents to the emergency department at [facility] complaining of rapid heart rate. Cardiology consulted for afib with rvr.    CAD, CABG, HTN, HLD, A fib  - p/w PAF with RVR with SOB at home in ED s/p Cardizem gtt, admits to had missed home BB   - on the morning of 4/27, patient was noted to have new onset aphasia and a code stroke was called.  He was deemed not a candidate for TNK as he is on AC.  He was found to have a L M3 occlusion but was deemed not a candidate for thrombectomy as occlusion too distal.    - Repeat CT brain demonstrated moderate-sized evolving acute/subacute L MCA territory infarct but no hemorrhagic conversion.   - neuro following   - Continue ASA and statin   - TTE (4/26) Technically difficult image quality. probably normal based on limited views    - BP, HR stable and controlled per flow sheet   - continue Lopressor, Amio with hold parameters    - Continue Eliquis 2.5  mg BID. Dose reduced in the setting of renal function and age. Previous Cr of 1.4, if his renal function goes back to baseline of 1.4 would place on full dose Eliquis for CVA protection  - Monitor and replete Lytes. Keep K > 4 and Mg > 2    - GI following, planned for Peg placement     - DC planning to [facility] per primary   - Will continue to follow.      Attestation Statements:

## 2024-05-14 NOTE — PROGRESS NOTE ADULT - SUBJECTIVE AND OBJECTIVE BOX
[facility] -- Génesis Villavicencio Pannella, Patel, Carolina Heath Cohen  Office # [phone]    Follow Up:     CAD, AF, CVA    Subjective/Observations: seen and examined, awake in bed, NAD, unable to provide meaningful information at this time. on O2 via NC, no events overnight      REVIEW OF SYSTEMS: All other review of systems is negative unless indicated above  PAST MEDICAL & SURGICAL HISTORY:  Diabetes Mellitus, Type II      CAD (Coronary Artery Disease)  s/p 3v CABG 2004; stents placed in [place] in 2019      Dyslipidemia      Osteomyelitis      COPD (chronic obstructive pulmonary disease)  on 2L at home and BiPAP at night; intubated 6/18      Hypertension      PVD (peripheral vascular disease)      History of PAT (paroxysmal atrial tachycardia)      Asthma with COPD      BPH (benign prostatic hyperplasia)      Acute osteomyelitis      CABG (Coronary Artery Bypass Graft)  2004      Compound fracture  left leg      S/P primary angioplasty with coronary stent      H/O drainage of abscess  Left femur 12/2021        MEDICATIONS  (STANDING):  albuterol/ipratropium for Nebulization 3 milliLiter(s) Nebulizer every 6 hours  aMIOdarone    Tablet 200 milliGRAM(s) Oral daily  aMIOdarone    Tablet 400  Oral   aspirin  chewable 81 milliGRAM(s) Enteral Tube daily  atorvastatin 80 milliGRAM(s) Oral at bedtime  buDESOnide    Inhalation Suspension 0.5 milliGRAM(s) Inhalation two times a day  ciprofloxacin     Tablet 500 milliGRAM(s) Oral every 12 hours  dextrose 10% Bolus 125 milliLiter(s) IV Bolus once  dextrose 5%. 1000 milliLiter(s) (50 mL/Hr) IV Continuous <Continuous>  dextrose 5%. 1000 milliLiter(s) (100 mL/Hr) IV Continuous <Continuous>  dextrose 5%. 1000 milliLiter(s) (50 mL/Hr) IV Continuous <Continuous>  dextrose 50% Injectable 25 Gram(s) IV Push once  dextrose 50% Injectable 12.5 Gram(s) IV Push once  dronabinol 2.5 milliGRAM(s) Oral two times a day  enoxaparin Injectable 90 milliGRAM(s) SubCutaneous every 12 hours  glucagon  Injectable 1 milliGRAM(s) IntraMuscular once  insulin glargine Injectable (LANTUS) 15 Unit(s) SubCutaneous at bedtime  insulin lispro (ADMELOG) corrective regimen sliding scale   SubCutaneous at bedtime  insulin lispro (ADMELOG) corrective regimen sliding scale   SubCutaneous three times a day before meals  metoprolol tartrate 25 milliGRAM(s) Oral two times a day  montelukast 10 milliGRAM(s) Oral daily  nystatin    Suspension 459606 Unit(s) Oral three times a day    MEDICATIONS  (PRN):  acetaminophen   Oral Liquid .. 650 milliGRAM(s) Oral every 6 hours PRN Temp greater or equal to 38.5C (101.3F)  dextrose Oral Gel 15 Gram(s) Oral once PRN Blood Glucose LESS THAN 70 milliGRAM(s)/deciliter    Allergies    No Known Allergies    Intolerances      Vital Signs Last 24 Hrs  T(C): 36.6 (14 May 2024 04:56), Max: 36.8 (13 May 2024 11:24)  T(F): 97.9 (14 May 2024 04:56), Max: 98.2 (13 May 2024 11:24)  HR: 76 (14 May 2024 08:03) (76 - 95)  BP: 137/68 (14 May 2024 04:56) (114/67 - 137/68)  BP(mean): --  RR: 18 (14 May 2024 04:56) (18 - 19)  SpO2: 95% (14 May 2024 08:03) (94% - 99%)    Parameters below as of 14 May 2024 08:03  Patient On (Oxygen Delivery Method): nasal cannula      I&O's Summary    13 May 2024 07:01  -  14 May 2024 07:00  --------------------------------------------------------  IN: 680 mL / OUT: 1500 mL / NET: -820 mL          TELE: Not on telemetry   PHYSICAL EXAM:  Constitutional: NAD, awake and alert  HEENT: Moist Mucous Membranes, Anicteric  Pulmonary: Non-labored, mild exp wheezing, rales or rhonchi  Cardiovascular: IRRR, S1 and S2, No murmurs, rubs, gallops or clicks  Gastrointestinal: Bowel Sounds present, soft, nontender.   Lymph: + b/l UE edema. No lymphadenopathy.  Skin: No visible rashes or ulcers.  Psych:  Mood & affect appropriate  LABS: All Labs Reviewed:                        11.6   6.09  )-----------( 318      ( 14 May 2024 08:03 )             39.4                         12.6   7.22  )-----------( 371      ( 13 May 2024 06:52 )             41.8                         13.0   7.99  )-----------( 408      ( 12 May 2024 06:20 )             43.0     14 May 2024 08:03    146    |  111    |  21     ----------------------------<  202    3.5     |  32     |  1.50   13 May 2024 06:52    146    |  111    |  23     ----------------------------<  165    3.4     |  33     |  1.60   12 May 2024 06:20    149    |  113    |  25     ----------------------------<  142    3.7     |  33     |  1.60     Ca    9.5        14 May 2024 08:03  Ca    9.9        13 May 2024 06:52  Ca    10.1       12 May 2024 06:20  Phos  2.6       14 May 2024 08:03  Phos  2.6       13 May 2024 06:52  Phos  2.6       12 May 2024 06:20  Mg     2.0       14 May 2024 08:03  Mg     2.1       13 May 2024 06:52  Mg     2.3       12 May 2024 06:20    TPro  6.4    /  Alb  2.7    /  TBili  0.6    /  DBili  x      /  AST  22     /  ALT  20     /  AlkPhos  101    13 May 2024 06:52  TPro  6.9    /  Alb  2.9    /  TBili  0.7    /  DBili  x      /  AST  25     /  ALT  22     /  AlkPhos  107    12 May 2024 06:20    PT/INR - ( 14 May 2024 08:03 )   PT: 13.7 sec;   INR: 1.18 ratio         PTT - ( 14 May 2024 08:03 )  PTT:39.1 sec      12 Lead ECG:   Ventricular Rate 95 BPM    Atrial Rate 95 BPM    P-R Interval 136 ms    QRS Duration 86 ms    Q-T Interval 352 ms    QTC Calculation(Bazett) 442 ms    P Axis 84 degrees    R Axis 88 degrees    T Axis 58 degrees    Diagnosis Line Normal sinus rhythm  Low voltage QRS  possible BREANNA  Borderline ECG    Confirmed by Cookie Ordaz ([id number]) on 5/2/2024 2:38:25 PM (05-02-24 @ 09:49)      TRANSTHORACIC ECHOCARDIOGRAM REPORT  ________________________________________________________________________________                                      _______       Pt. Name:       YUE COTTO Study Date:    4/26/2024  MRN:            [id number]            YOB: 1940  Accession #:    [id number]           Age:           83 years  Account#:       [id number]          Gender:        M  Visit ID#  Heart Rate:                         Height:        70.08 in (178.00 cm)  Rhythm:                            Weight:        220.46 lb (100.00 kg)  Blood Pressure: 158/69 mmHg         BSA/BMI:       2.18 m² / 31.56 kg/m²  ________________________________________________________________________________________  Referring Physician:   [id number] Marc Grossman  Interpreting Physician: Cookie Ordaz MD  Primary Sonographer:    Clayton Wilkinson    CPT:               ECHO TTE WO CON COMP W DOPP - 79764.m  Indication(s):     Unspecified atrial fibrillation - I48.91  Procedure:         Transthoracic echocardiogram with 2-D, M-mode and complete                     spectral and color flow Doppler.  Ordering Location: [facility]  Admission Status:  Inpatient  Study Information: Image quality for this study is technically difficult.    _______________________________________________________________________________________     CONCLUSIONS:      1. Technically difficult image quality.   2. The LV is not well visualized, however the LV function is probably normal based on limited views.   3. The right ventricle is not well visualized. probably normal systolic function.   4. There is moderate thickening of the aortic valve leaflets.   5. Structurally normal mitral valve with normal leaflet excursion.   6. Trace tricuspid regurgitation.   7. The inferior vena cava is dilated measuring 2.18 cm in diameter, (dilated >2.1cm) with normal inspiratory collapse (normal >50%) consistent with mildly elevated right atrial pressure (~8, range 5-10mmHg).   8. Estimated pulmonary artery systolic pressure is 26 mmHg, consistent with normal pulmonary artery pressure.    ________________________________________________________________________________________  FINDINGS:     Left Ventricle:  There is normal left ventricular diastolic function. Left ventricular endocardium is not well visualized.     Right Ventricle:  The right ventricle is not well visualized. Probably normal systolic function.     Left Atrium:  The left atrium is normal in size with an indexed volume of 16.30 ml/m².     Right Atrium:  The right atrium is normal in size.     Aortic Valve:  The aortic valve anatomy cannot be determined with normal systolic excursion. There is mild calcification of the aortic valve leaflets. There is moderate thickening of the aortic valve leaflets.     Mitral Valve:  Structurally normal mitral valve with normal leaflet excursion. There is trace mitral regurgitation.     Tricuspid Valve:  The tricuspid valve was not well visualized. There is trace tricuspid regurgitation. Estimated pulmonary artery systolic pressure is 26 mmHg, consistent with normal pulmonary artery pressure.     Pulmonic Valve:  The pulmonic valve was not well visualized.     Aorta:  The aortic root at the sinuses of Valsalva is normal in size, measuring 3.30 cm (indexed 1.52 cm/m²).     Systemic Veins:  The inferior vena cava is dilated measuring 2.18 cm in diameter, (dilated >2.1cm) with normal inspiratory collapse (normal >50%) consistent with mildly elevated right atrial pressure (~8, range 5-10mmHg).  ____________________________________________________________________  QUANTITATIVE DATA:  Left Ventricle Measurements: (Indexed to BSA)     IVSd (2D):   1.1 cm  LVPWd (2D):  0.9 cm  LVIDd (2D):  4.0 cm  LVIDs (2D):  2.8 cm  LV Mass:     129 g  59.1 g/m²     MVE Vmax:    0.59 m/s  MV A Vmax:    0.85 m/s  MV E/A:       0.69  e' lateral:   6.96 cm/s  e' medial:    5.98 cm/s  E/e' lateral: 8.43  E/e' medial:  9.82  E/e' Average: 9.07  MV DT:        195 msec    Aorta Measurements: (Normal range) (Indexed to BSA)     Sinuses of Valsalva: 3.30 cm (3.1 - 3.7 cm)       Left Atrium Measurements: (Indexed to BSA)  LA Diam 2D: 3.30 cm       LVOT / RVOT/ Qp/Qs Data: (Indexed to BSA)  LVOT Diameter: 1.80 cm  LVOT Area:     2.54 cm²    Mitral Valve Measurements:     MV E Vmax: 0.6 m/s  MV A Vmax: 0.8 m/s  MV E/A:    0.7       Tricuspid Valve Measurements:     TR Vmax:          2.1 m/s  TR Peak Gradient: 18.1 mmHg  RA Pressure:      8 mmHg  PASP:             26 mmHg    ________________________________________________________________________________________  Electronically signed on 4/26/2024 at 4:46:45 PM by Cookie Ordaz MD         *** Final ***

## 2024-05-14 NOTE — PROGRESS NOTE ADULT - PROBLEM SELECTOR PLAN 2
Chronic, on home Metoprolol BID and Eliquis   - TTE 4/26/2024: LV no well visualized however LV probably normal based on limited views, moderate thickening of aortic valve leaflets, trace TR, dilated IVC with normal inspiratory collapse, normal pulmonary artery pressure  - Hold eliquis for PEG placement wednesday  - c/w amiodarone and lopressor 25mg BID  - Cardiology ([facility]) following, recs appreciated

## 2024-05-14 NOTE — SOCIAL WORK PROGRESS NOTE - NSSWPROGRESSNOTE_GEN_ALL_CORE
[facility] medically accepted patient. Met with wife after she toured facility and she is accepting bed there. [facility] informed me they will hold bed for Friday. I left message for Chema  to see if she can update auth thru the [place] portal to [facility]. Social work to follow.

## 2024-05-14 NOTE — PROGRESS NOTE ADULT - SUBJECTIVE AND OBJECTIVE BOX
Date/Time Patient Seen:  		  Referring MD:   Data Reviewed	       Patient is a 84y old  Male who presents with a chief complaint of AFib w/ RVR (13 May 2024 15:07)      Subjective/HPI     PAST MEDICAL & SURGICAL HISTORY:  Diabetes Mellitus, Type II    CAD (Coronary Artery Disease)  s/p 3v CABG 2004; stents placed in [place] in 2019    Dyslipidemia    Asymptomatic Peripheral Vascular Disease    Osteomyelitis    COPD (chronic obstructive pulmonary disease)  on 2L at home and BiPAP at night; intubated 6/18    Diabetes mellitus    Hypertension    PVD (peripheral vascular disease)    History of PAT (paroxysmal atrial tachycardia)    Asthma with COPD    BPH (benign prostatic hyperplasia)    Acute osteomyelitis    CABG (Coronary Artery Bypass Graft)  2004    Compound fracture  left leg    S/P primary angioplasty with coronary stent    H/O drainage of abscess  Left femur 12/2021          Medication list         MEDICATIONS  (STANDING):  albuterol/ipratropium for Nebulization 3 milliLiter(s) Nebulizer every 6 hours  aMIOdarone    Tablet 400  Oral   aMIOdarone    Tablet 200 milliGRAM(s) Oral daily  aspirin  chewable 81 milliGRAM(s) Enteral Tube daily  atorvastatin 80 milliGRAM(s) Oral at bedtime  buDESOnide    Inhalation Suspension 0.5 milliGRAM(s) Inhalation two times a day  ciprofloxacin     Tablet 500 milliGRAM(s) Oral every 12 hours  dextrose 10% Bolus 125 milliLiter(s) IV Bolus once  dextrose 5%. 1000 milliLiter(s) (50 mL/Hr) IV Continuous <Continuous>  dextrose 5%. 1000 milliLiter(s) (50 mL/Hr) IV Continuous <Continuous>  dextrose 5%. 1000 milliLiter(s) (100 mL/Hr) IV Continuous <Continuous>  dextrose 50% Injectable 25 Gram(s) IV Push once  dextrose 50% Injectable 12.5 Gram(s) IV Push once  dronabinol 2.5 milliGRAM(s) Oral two times a day  enoxaparin Injectable 90 milliGRAM(s) SubCutaneous every 12 hours  glucagon  Injectable 1 milliGRAM(s) IntraMuscular once  insulin glargine Injectable (LANTUS) 15 Unit(s) SubCutaneous at bedtime  insulin lispro (ADMELOG) corrective regimen sliding scale   SubCutaneous at bedtime  insulin lispro (ADMELOG) corrective regimen sliding scale   SubCutaneous three times a day before meals  metoprolol tartrate 25 milliGRAM(s) Oral two times a day  montelukast 10 milliGRAM(s) Oral daily  nystatin    Suspension 182286 Unit(s) Oral three times a day    MEDICATIONS  (PRN):  acetaminophen   Oral Liquid .. 650 milliGRAM(s) Oral every 6 hours PRN Temp greater or equal to 38.5C (101.3F)  dextrose Oral Gel 15 Gram(s) Oral once PRN Blood Glucose LESS THAN 70 milliGRAM(s)/deciliter         Vitals log        ICU Vital Signs Last 24 Hrs  T(C): 36.6 (14 May 2024 04:56), Max: 36.8 (13 May 2024 11:24)  T(F): 97.9 (14 May 2024 04:56), Max: 98.2 (13 May 2024 11:24)  HR: 84 (14 May 2024 04:56) (78 - 95)  BP: 137/68 (14 May 2024 04:56) (114/67 - 137/68)  BP(mean): --  ABP: --  ABP(mean): --  RR: 18 (14 May 2024 04:56) (18 - 19)  SpO2: 97% (14 May 2024 04:56) (94% - 99%)    O2 Parameters below as of 14 May 2024 04:56  Patient On (Oxygen Delivery Method): nasal cannula  O2 Flow (L/min): 2               Input and Output:  I&O's Detail    12 May 2024 07:01  -  13 May 2024 07:00  --------------------------------------------------------  IN:    dextrose 5%: 300 mL  Total IN: 300 mL    OUT:    Incontinent per Condom Catheter (mL): 400 mL  Total OUT: 400 mL    Total NET: -100 mL      13 May 2024 07:01  -  14 May 2024 05:45  --------------------------------------------------------  IN:    dextrose 5%: 600 mL  Total IN: 600 mL    OUT:    Incontinent per Condom Catheter (mL): 1000 mL  Total OUT: 1000 mL    Total NET: -400 mL          Lab Data                        12.6   7.22  )-----------( 371      ( 13 May 2024 06:52 )             41.8     05-13    146<H>  |  111<H>  |  23  ----------------------------<  165<H>  3.4<L>   |  33<H>  |  1.60<H>    Ca    9.9      13 May 2024 06:52  Phos  2.6     05-13  Mg     2.1     05-13    TPro  6.4  /  Alb  2.7<L>  /  TBili  0.6  /  DBili  x   /  AST  22  /  ALT  20  /  AlkPhos  101  05-13            Review of Systems	      Objective     Physical Examination    heart s1s2  lung dc BS  head nc      Pertinent Lab findings & Imaging      Eliecer:  NO   Adequate UO     I&O's Detail    12 May 2024 07:01  -  13 May 2024 07:00  --------------------------------------------------------  IN:    dextrose 5%: 300 mL  Total IN: 300 mL    OUT:    Incontinent per Condom Catheter (mL): 400 mL  Total OUT: 400 mL    Total NET: -100 mL      13 May 2024 07:01  -  14 May 2024 05:45  --------------------------------------------------------  IN:    dextrose 5%: 600 mL  Total IN: 600 mL    OUT:    Incontinent per Condom Catheter (mL): 1000 mL  Total OUT: 1000 mL    Total NET: -400 mL               Discussed with:     Cultures:	        Radiology

## 2024-05-14 NOTE — PROGRESS NOTE ADULT - ASSESSMENT
Hypernatremia: decreased free water intake  MERRILL on CKD 3: Prerenal azotemia  Hypotension, on midodrine  Diabetes  Atrial fibrillation  CVA  Hypokalemia    Stable renal indices. For PEG. Monitor blood sugar levels. Insulin coverage as needed. PRN Potassium supplementation.   Monitor BP trend. Encourage PO intake as tolerated. Avoid nephrotoxic meds as possible. Will follow electrolytes and renal function trend.  Discussed with patients wife at the bedside. D/c planning.

## 2024-05-14 NOTE — PROGRESS NOTE ADULT - SUBJECTIVE AND OBJECTIVE BOX
Neurology Follow up note    YUE [id number]    HPI:  83yoM PMHx AFib on Eliquis, CAD, HTN, DM, HLD, COPD, osteomyelitis presents c/o shortness of breath, chest discomfort. Pt reports onset of rapid heart rate this morning, HR measured 160s when he checked his pulse ox. Also endorses dyspnea exacerbated by movement. Pt reports last episode of AFib in 2020, no episodes since then until today's presentation. Pt states that chest discomfort feels like chest pressure, no chest pain. Pt also reports chronic R big toe wound for the past few months, follows w/ Wound Care. States that he has increased sensitivity to R sole of foot, but denies pain, numbness/tingling. Denies fevers, chills, abdominal pain, N/V/D/C.     IN THE ED:  Temp 98  F ,  , /81  ,RR 18 , SpO2 94%  S/P PO , IV diltiazem 10mg x2, IV diltiazem gtt @ 10 mg/hr  Labs significant for BUN/Cr 25/1.6, troponin 507.9, pBNP 355  Imaging:   CXR wet read: no gross abnormalities (25 Apr 2024 12:13)      Interval History -no new weakness    Patient is seen, chart was reviewed and case was discussed with the treatment team.  Pt is not in any distress.   Lying on bed comfortably.     Vital Signs Last 24 Hrs  T(C): 36.4 (14 May 2024 11:32), Max: 36.7 (13 May 2024 20:20)  T(F): 97.6 (14 May 2024 11:32), Max: 98 (13 May 2024 20:20)  HR: 87 (14 May 2024 16:43) (76 - 89)  BP: 103/63 (14 May 2024 16:43) (103/63 - 137/68)  BP(mean): --  RR: 19 (14 May 2024 11:32) (18 - 19)  SpO2: 96% (14 May 2024 11:32) (94% - 99%)    Parameters below as of 14 May 2024 11:32  Patient On (Oxygen Delivery Method): nasal cannula  O2 Flow (L/min): 2  2                                              REVIEW OF SYSTEMS:    Constitutional: No fever, weight loss or fatigue  Eyes: No eye pain, visual disturbances, or discharge  ENT:  No difficulty hearing, tinnitus, vertigo; No sinus or throat pain  Neck: No pain or stiffness  Respiratory: No wheezing, chills or hemoptysis  Cardiovascular: No chest pain, palpitations, shortness of breath, dizziness  Gastrointestinal: No abdominal or epigastric pain. No nausea, vomiting or hematemesis  Genitourinary: No dysuria, frequency, hematuria or incontinence  Neurological: No headaches, numbness or tremors  Psychiatric: No depression, anxiety, mood swings or difficulty sleeping  Musculoskeletal: No joint pain or swelling; No muscle, back or extremity pain  Skin: No itching, burning, rashes or lesions   Lymph Nodes: No enlarged glands  Endocrine: No heat or cold intolerance; No hair loss,   Allergy and Immunologic: No hives or eczema    On Neurological Examination:    Mental Status - Pt is alert, awake, oriented X3.. Follows commands well and able to answer questions appropriately.Mood and affect  normal    Speech -  aphasia of mixed type    Cranial Nerves - Pupils 3 mm equal and reactive to light, extraocular eye movements intact. Pt has no visual field deficit.  Pt has right facial weakness  . Facial sensation is intact.Tongue - is in midline.    Muscle tone - is decreased on right  .      Motor Exam -right hemiparesis; RUE 1/5; LE 1-2/5   No shaking or tremors.    Sensory Exam -. Pt withdraws all extremities equally on stimulation. No asymmetry seen. No complaints of tingling, numbness.        coordination:    Finger to nose: normal-left        Deep tendon Reflexes - 2 plus all over.            Neck Supple -  Yes.      MEDICATIONS  (STANDING):    albuterol/ipratropium for Nebulization 3 milliLiter(s) Nebulizer every 6 hours  aMIOdarone    Tablet 400  Oral   aMIOdarone    Tablet 200 milliGRAM(s) Oral daily  apixaban 2.5 milliGRAM(s) Oral every 12 hours  aspirin  chewable 81 milliGRAM(s) Enteral Tube daily  atorvastatin 80 milliGRAM(s) Oral at bedtime  buDESOnide    Inhalation Suspension 0.5 milliGRAM(s) Inhalation two times a day  ciprofloxacin     Tablet 500 milliGRAM(s) Oral every 12 hours  dextrose 10% Bolus 125 milliLiter(s) IV Bolus once  dextrose 5%. 1000 milliLiter(s) (100 mL/Hr) IV Continuous <Continuous>  dextrose 5%. 1000 milliLiter(s) (50 mL/Hr) IV Continuous <Continuous>  dextrose 5%. 1000 milliLiter(s) (50 mL/Hr) IV Continuous <Continuous>  dextrose 50% Injectable 25 Gram(s) IV Push once  dextrose 50% Injectable 12.5 Gram(s) IV Push once  dronabinol 2.5 milliGRAM(s) Oral two times a day  glucagon  Injectable 1 milliGRAM(s) IntraMuscular once  insulin glargine Injectable (LANTUS) 15 Unit(s) SubCutaneous at bedtime  insulin lispro (ADMELOG) corrective regimen sliding scale   SubCutaneous three times a day before meals  insulin lispro (ADMELOG) corrective regimen sliding scale   SubCutaneous at bedtime  metoprolol tartrate 25 milliGRAM(s) Oral two times a day  montelukast 10 milliGRAM(s) Oral daily  nystatin    Suspension 718471 Unit(s) Oral three times a day    MEDICATIONS  (PRN):  acetaminophen   Oral Liquid .. 650 milliGRAM(s) Oral every 6 hours PRN Temp greater or equal to 38.5C (101.3F)  dextrose Oral Gel 15 Gram(s) Oral once PRN Blood Glucose LESS THAN 70 milliGRAM(s)/deciliter              Allergies    No Known Allergies    Intolerances                                                11.6   6.09  )-----------( 318      ( 14 May 2024 08:03 )             39.4                 Hemoglobin A1C:     Vitamin B12     RADIOLOGY    ASSESSMENT AND PLAN:      Seen for right sided weakness/ams  consistent subacute left mca infarct(missed one dose of apixaban)  also multiple punctate subacute cerebellar infarcts  most likely cardio-embolic    for PEG tomorrow  aspiration precaution  ON AC and asa  continue statin  Physical therapy evaluation.  Pain is accessed and addressed.  Plan of care was discussed with family(wife_. Questions answered.  Would continue to follow.

## 2024-05-14 NOTE — SOCIAL WORK PROGRESS NOTE - NSSWPROGRESSNOTE_GEN_ALL_CORE
Discussed at daily rounds. I met with wife today. Wife requested referral to [facility] & is going to visit today. Encouraged her to let me know if she likes [facility]. Referral sent today, await their response. If improves after peg she  wants re-referral to [facility]. [facility] expires on 5/17. Patient scheduled for peg placement tomorrow. Social work to follow.

## 2024-05-14 NOTE — PROGRESS NOTE ADULT - ASSESSMENT
83-year-old  black male with history of COPD coronary artery disease hypertension diabetes hyperlipidemia atrial fibrillation on Eliquis as well as osteomyelitis who presents now to the emergency department at [facility] complaining of rapid heart rate    jacy  copd  AF  OP  OA  CAD  HTN  DM  HLD  OM hx  CVA     Pureed diet - swallow studies noted - on Marinol - GI eval -   Physiatry following  s/p Nucala for Asthma - as per home rx regimen  s/p CVA tx - CT head repeat noted -   GOC documented - DNR DNI  Plan for PEG - GI eval noted    follows with Dr Ryland ashley med  uses NIV - BIPAP night time for JACY - sleep hygiene reviewed  cardio eval - cvs rx regimen  BP control  DM care  AF rate and rhythm control  TFT  outpatient record reviewed  proventil PRN - Singulair - copd  spoke with WIFE  wound care

## 2024-05-14 NOTE — PROGRESS NOTE ADULT - PROBLEM SELECTOR PLAN 10
- Hold eliquis for PEG placement wednesday  - Lovenox 90 mg q12h    #GOC:  - DNR/DNI  - Plan for PEG tomorrow    #Dispo  - SW following, pending [facility] acceptances

## 2024-05-15 NOTE — PROGRESS NOTE ADULT - ASSESSMENT
83-year-old  black male with history of COPD coronary artery disease hypertension diabetes hyperlipidemia atrial fibrillation on Eliquis as well as osteomyelitis who presents now to the emergency department at [facility] complaining of rapid heart rate    jacy  copd  AF  OP  OA  CAD  HTN  DM  HLD  OM hx  CVA     Pureed diet - swallow studies noted - on Marinol - GI eval -   Physiatry following  s/p Nucala for Asthma - as per home rx regimen  s/p CVA tx - CT head repeat noted -   GOC documented - DNR DNI  Plan for PEG - GI eval noted    follows with Dr Ryland ashley med  uses NIV - BIPAP night time for JACY - sleep hygiene reviewed  cardio eval - cvs rx regimen  BP control  DM care  AF rate and rhythm control  TFT  outpatient record reviewed  proventil PRN - Singulair - copd  spoke with WIFE  wound care

## 2024-05-15 NOTE — PROGRESS NOTE ADULT - PROBLEM SELECTOR PLAN 2
Chronic, on home Metoprolol BID and Eliquis   - TTE 4/26/2024: LV no well visualized however LV probably normal based on limited views, moderate thickening of aortic valve leaflets, trace TR, dilated IVC with normal inspiratory collapse, normal pulmonary artery pressure  - Hold eliquis for PEG placement today, will resume after procedure  - c/w amiodarone and lopressor 25mg BID  - Cardiology ([facility]) following, recs appreciated

## 2024-05-15 NOTE — PROGRESS NOTE ADULT - ASSESSMENT
83-year-old M with history of COPD on home O2 PRN, coronary artery disease s/p CABG,  hypertension, diabetes, hyperlipidemia, atrial fibrillation on Eliquis as well as chronic osteomyelitis who presents to the emergency department at [facility] complaining of rapid heart rate. Cardiology consulted for afib with rvr.    CAD, CABG, HTN, HLD, A fib  - p/w PAF with RVR with SOB at home in ED s/p Cardizem gtt, admits to had missed home BB   - on the morning of 4/27, patient was noted to have new onset aphasia and a code stroke was called.  He was deemed not a candidate for TNK as he is on AC.  He was found to have a L M3 occlusion but was deemed not a candidate for thrombectomy as occlusion too distal.    - Repeat CT brain demonstrated moderate-sized evolving acute/subacute L MCA territory infarct but no hemorrhagic conversion.   - neuro following   - Continue ASA and statin   - TTE (4/26) Technically difficult image quality. probably normal based on limited views    - BP, HR stable and controlled per flow sheet   - continue Lopressor, Amio with hold parameters    - Continue Eliquis 2.5  mg BID. Dose reduced in the setting of renal function and age. Previous Cr of 1.4, if his renal function goes back to baseline of 1.4 would place on full dose Eliquis for CVA protection  - Monitor and replete Lytes. Keep K > 4 and Mg > 2    - GI following, planned for Peg placement today     - DC planning to [facility] per primary   - Will continue to follow    VALDO Espino  Nurse Practitioner - Cardiology   call TEAMS

## 2024-05-15 NOTE — PROGRESS NOTE ADULT - SUBJECTIVE AND OBJECTIVE BOX
Patient is a 84y old  Male who presents with a chief complaint of AFib w/ RVR (15 May 2024 11:29)      INTERVAL HPI/OVERNIGHT EVENTS: Patient seen and examined at bedside. No overnight events occurred. Pt has no acute complaints. Continues to nod to answer questions. Plan for PEG placement today.    MEDICATIONS  (STANDING):  albuterol/ipratropium for Nebulization 3 milliLiter(s) Nebulizer every 6 hours  aMIOdarone    Tablet 200 milliGRAM(s) Oral daily  aMIOdarone    Tablet 400  Oral   aspirin  chewable 81 milliGRAM(s) Enteral Tube daily  atorvastatin 80 milliGRAM(s) Oral at bedtime  buDESOnide    Inhalation Suspension 0.5 milliGRAM(s) Inhalation two times a day  ciprofloxacin     Tablet 500 milliGRAM(s) Oral every 12 hours  dextrose 10% Bolus 125 milliLiter(s) IV Bolus once  dextrose 5%. 1000 milliLiter(s) (50 mL/Hr) IV Continuous <Continuous>  dextrose 5%. 1000 milliLiter(s) (100 mL/Hr) IV Continuous <Continuous>  dextrose 50% Injectable 25 Gram(s) IV Push once  dextrose 50% Injectable 12.5 Gram(s) IV Push once  dronabinol 2.5 milliGRAM(s) Oral two times a day  glucagon  Injectable 1 milliGRAM(s) IntraMuscular once  insulin glargine Injectable (LANTUS) 15 Unit(s) SubCutaneous at bedtime  insulin lispro (ADMELOG) corrective regimen sliding scale   SubCutaneous at bedtime  insulin lispro (ADMELOG) corrective regimen sliding scale   SubCutaneous three times a day before meals  metoprolol tartrate 25 milliGRAM(s) Oral two times a day  montelukast 10 milliGRAM(s) Oral daily  nystatin    Suspension 063300 Unit(s) Oral three times a day    MEDICATIONS  (PRN):  acetaminophen   Oral Liquid .. 650 milliGRAM(s) Oral every 6 hours PRN Temp greater or equal to 38.5C (101.3F)  dextrose Oral Gel 15 Gram(s) Oral once PRN Blood Glucose LESS THAN 70 milliGRAM(s)/deciliter      Allergies    No Known Allergies    Intolerances        REVIEW OF SYSTEMS:  CONSTITUTIONAL: No fever  HEENT:  No headache  RESPIRATORY: No SOB  CARDIOVASCULAR: No chest pain  GASTROINTESTINAL: No abd pain, nausea  NEUROLOGICAL: +weakness  MUSCULOSKELETAL: no myalgias     Vital Signs Last 24 Hrs  T(C): 36.4 (15 May 2024 11:18), Max: 36.6 (14 May 2024 20:57)  T(F): 97.6 (15 May 2024 11:18), Max: 97.9 (14 May 2024 20:57)  HR: 90 (15 May 2024 11:18) (84 - 92)  BP: 110/60 (15 May 2024 11:18) (103/63 - 130/67)  BP(mean): --  RR: 18 (15 May 2024 11:18) (18 - 19)  SpO2: 95% (15 May 2024 11:18) (95% - 97%)    Parameters below as of 15 May 2024 11:18  Patient On (Oxygen Delivery Method): nasal cannula  O2 Flow (L/min): 3      PHYSICAL EXAM:  GENERAL: NAD, laying in bed  HEENT:  Thrush improved. Moist mucous membranes  CHEST/LUNG:  +coarse breath sounds L>R. No accessory muscle use  HEART:  +irregular rhythm. S1, S2  ABDOMEN:  BS+, soft, NT/ND  EXTREMITIES: Trace edema b/l  NERVOUS SYSTEM: +aphasia. +R sided hemiparesis. Follows commands.     LABS:                        11.9   6.18  )-----------( 330      ( 15 May 2024 06:57 )             38.8     CBC Full  -  ( 15 May 2024 06:57 )  WBC Count : 6.18 K/uL  Hemoglobin : 11.9 g/dL  Hematocrit : 38.8 %  Platelet Count - Automated : 330 K/uL  Mean Cell Volume : 89.2 fl  Mean Cell Hemoglobin : 27.4 pg  Mean Cell Hemoglobin Concentration : 30.7 gm/dL  Auto Neutrophil # : 4.35 K/uL  Auto Lymphocyte # : 0.92 K/uL  Auto Monocyte # : 0.75 K/uL  Auto Eosinophil # : 0.08 K/uL  Auto Basophil # : 0.04 K/uL  Auto Neutrophil % : 70.5 %  Auto Lymphocyte % : 14.9 %  Auto Monocyte % : 12.1 %  Auto Eosinophil % : 1.3 %  Auto Basophil % : 0.6 %    15 May 2024 06:57    145    |  108    |  21     ----------------------------<  191    3.5     |  34     |  1.60     Ca    9.4        15 May 2024 06:57  Phos  2.6       15 May 2024 06:57  Mg     2.1       15 May 2024 06:57      PT/INR - ( 14 May 2024 08:03 )   PT: 13.7 sec;   INR: 1.18 ratio         PTT - ( 14 May 2024 08:03 )  PTT:39.1 sec  Urinalysis Basic - ( 15 May 2024 06:57 )    Color: x / Appearance: x / SG: x / pH: x  Gluc: 191 mg/dL / Ketone: x  / Bili: x / Urobili: x   Blood: x / Protein: x / Nitrite: x   Leuk Esterase: x / RBC: x / WBC x   Sq Epi: x / Non Sq Epi: x / Bacteria: x      CAPILLARY BLOOD GLUCOSE      POCT Blood Glucose.: 181 mg/dL (15 May 2024 08:14)  POCT Blood Glucose.: 166 mg/dL (14 May 2024 21:13)  POCT Blood Glucose.: 174 mg/dL (14 May 2024 17:06)  POCT Blood Glucose.: 171 mg/dL (14 May 2024 12:33)        Personally reviewed.     Consultant(s) Notes Reviewed:  [x] YES  [ ] NO     Patient is a 84y old  Male who presents with a chief complaint of AFib w/ RVR (15 May 2024 11:29)      INTERVAL HPI/OVERNIGHT EVENTS: Patient seen and examined at bedside. No overnight events occurred. Pt has no acute complaints. Continues to nod to answer questions. Plan for PEG placement today.    MEDICATIONS  (STANDING):  albuterol/ipratropium for Nebulization 3 milliLiter(s) Nebulizer every 6 hours  aMIOdarone    Tablet 200 milliGRAM(s) Oral daily  aMIOdarone    Tablet 400  Oral   aspirin  chewable 81 milliGRAM(s) Enteral Tube daily  atorvastatin 80 milliGRAM(s) Oral at bedtime  buDESOnide    Inhalation Suspension 0.5 milliGRAM(s) Inhalation two times a day  ciprofloxacin     Tablet 500 milliGRAM(s) Oral every 12 hours  dextrose 10% Bolus 125 milliLiter(s) IV Bolus once  dextrose 5%. 1000 milliLiter(s) (50 mL/Hr) IV Continuous <Continuous>  dextrose 5%. 1000 milliLiter(s) (100 mL/Hr) IV Continuous <Continuous>  dextrose 50% Injectable 25 Gram(s) IV Push once  dextrose 50% Injectable 12.5 Gram(s) IV Push once  dronabinol 2.5 milliGRAM(s) Oral two times a day  glucagon  Injectable 1 milliGRAM(s) IntraMuscular once  insulin glargine Injectable (LANTUS) 15 Unit(s) SubCutaneous at bedtime  insulin lispro (ADMELOG) corrective regimen sliding scale   SubCutaneous at bedtime  insulin lispro (ADMELOG) corrective regimen sliding scale   SubCutaneous three times a day before meals  metoprolol tartrate 25 milliGRAM(s) Oral two times a day  montelukast 10 milliGRAM(s) Oral daily  nystatin    Suspension [id number] Unit(s) Oral three times a day    MEDICATIONS  (PRN):  acetaminophen   Oral Liquid .. 650 milliGRAM(s) Oral every 6 hours PRN Temp greater or equal to 38.5C (101.3F)  dextrose Oral Gel 15 Gram(s) Oral once PRN Blood Glucose LESS THAN 70 milliGRAM(s)/deciliter      Allergies    No Known Allergies    Intolerances        REVIEW OF SYSTEMS:  CONSTITUTIONAL: No fever  HEENT:  No headache  RESPIRATORY: No SOB  CARDIOVASCULAR: No chest pain  GASTROINTESTINAL: No abd pain, nausea  NEUROLOGICAL: +weakness  MUSCULOSKELETAL: no myalgias     Vital Signs Last 24 Hrs  T(C): 36.4 (15 May 2024 11:18), Max: 36.6 (14 May 2024 20:57)  T(F): 97.6 (15 May 2024 11:18), Max: 97.9 (14 May 2024 20:57)  HR: 90 (15 May 2024 11:18) (84 - 92)  BP: 110/60 (15 May 2024 11:18) (103/63 - 130/67)  BP(mean): --  RR: 18 (15 May 2024 11:18) (18 - 19)  SpO2: 95% (15 May 2024 11:18) (95% - 97%)    Parameters below as of 15 May 2024 11:18  Patient On (Oxygen Delivery Method): nasal cannula  O2 Flow (L/min): 3      PHYSICAL EXAM:  GENERAL: NAD, laying in bed  HEENT:  Thrush improved. Moist mucous membranes  CHEST/LUNG:  +coarse breath sounds L>R. No accessory muscle use  HEART:  +irregular rhythm. S1, S2  ABDOMEN:  BS+, soft, NT/ND  EXTREMITIES: Trace edema b/l  NERVOUS SYSTEM: +aphasia. +R sided hemiparesis. Follows commands.   gu intact    LABS:                        11.9   6.18  )-----------( 330      ( 15 May 2024 06:57 )             38.8     CBC Full  -  ( 15 May 2024 06:57 )  WBC Count : 6.18 K/uL  Hemoglobin : 11.9 g/dL  Hematocrit : 38.8 %  Platelet Count - Automated : 330 K/uL  Mean Cell Volume : 89.2 fl  Mean Cell Hemoglobin : 27.4 pg  Mean Cell Hemoglobin Concentration : 30.7 gm/dL  Auto Neutrophil # : 4.35 K/uL  Auto Lymphocyte # : 0.92 K/uL  Auto Monocyte # : 0.75 K/uL  Auto Eosinophil # : 0.08 K/uL  Auto Basophil # : 0.04 K/uL  Auto Neutrophil % : 70.5 %  Auto Lymphocyte % : 14.9 %  Auto Monocyte % : 12.1 %  Auto Eosinophil % : 1.3 %  Auto Basophil % : 0.6 %    15 May 2024 06:57    145    |  108    |  21     ----------------------------<  191    3.5     |  34     |  1.60     Ca    9.4        15 May 2024 06:57  Phos  2.6       15 May 2024 06:57  Mg     2.1       15 May 2024 06:57      PT/INR - ( 14 May 2024 08:03 )   PT: 13.7 sec;   INR: 1.18 ratio         PTT - ( 14 May 2024 08:03 )  PTT:39.1 sec  Urinalysis Basic - ( 15 May 2024 06:57 )    Color: x / Appearance: x / SG: x / pH: x  Gluc: 191 mg/dL / Ketone: x  / Bili: x / Urobili: x   Blood: x / Protein: x / Nitrite: x   Leuk Esterase: x / RBC: x / WBC x   Sq Epi: x / Non Sq Epi: x / Bacteria: x      CAPILLARY BLOOD GLUCOSE      POCT Blood Glucose.: 181 mg/dL (15 May 2024 08:14)  POCT Blood Glucose.: 166 mg/dL (14 May 2024 21:13)  POCT Blood Glucose.: 174 mg/dL (14 May 2024 17:06)  POCT Blood Glucose.: 171 mg/dL (14 May 2024 12:33)        Personally reviewed.     Consultant(s) Notes Reviewed:  [x] YES  [ ] NO

## 2024-05-15 NOTE — PROGRESS NOTE ADULT - TIME BILLING
pt seen and examine today see above plan -  acute     cva   MRI Head: Posterior left MCA vascular ischemic stroke. No hemorrhagic conversion, repeat   cause - Most likely cardio-embolic   - rt side weakness - C/w ASA and Lipitor qd  hold Eliquis until peg  today    for increase  po  caloric  intake  . pt seen and examine today see above plan -  acute     cva   MRI Head: Posterior left MCA vascular ischemic stroke. No hemorrhagic conversion, repeat   cause - Most likely cardio-embolic   - rt side weakness - C/w ASA and Lipitor qd  hold Eliquis until peg  today    for increase  po  caloric  intake  . wife bed side aware tt/plan .

## 2024-05-15 NOTE — PROGRESS NOTE ADULT - SUBJECTIVE AND OBJECTIVE BOX
Neurology Follow up note    YUE [id number]    HPI:  83yoM PMHx AFib on Eliquis, CAD, HTN, DM, HLD, COPD, osteomyelitis presents c/o shortness of breath, chest discomfort. Pt reports onset of rapid heart rate this morning, HR measured 160s when he checked his pulse ox. Also endorses dyspnea exacerbated by movement. Pt reports last episode of AFib in 2020, no episodes since then until today's presentation. Pt states that chest discomfort feels like chest pressure, no chest pain. Pt also reports chronic R big toe wound for the past few months, follows w/ [facility]ound [facility]. States that he has increased sensitivity to R sole of foot, but denies pain, numbness/tingling. Denies fevers, chills, abdominal pain, N/V/D/C.     IN THE ED:  Temp 98  F ,  , /81  ,RR 18 , SpO2 94%  S/P PO , IV diltiazem 10mg x2, IV diltiazem gtt @ 10 mg/hr  Labs significant for BUN/Cr 25/1.6, troponin 507.9, pBNP 355  Imaging:   CXR wet read: no gross abnormalities (25 Apr 2024 12:13)      Interval History -no new weakness    Patient is seen, chart was reviewed and case was discussed with the treatment team.  Pt is not in any distress.   Lying on bed comfortably.     Vital Signs Last 24 Hrs  T(C): 36.6 (15 May 2024 15:25), Max: 36.9 (15 May 2024 13:56)  T(F): 97.9 (15 May 2024 15:25), Max: 98.4 (15 May 2024 13:56)  HR: 78 (15 May 2024 17:28) (78 - 96)  BP: 137/74 (15 May 2024 17:28) (110/60 - 137/74)  BP(mean): --  RR: 17 (15 May 2024 15:25) (14 - 19)  SpO2: 100% (15 May 2024 15:25) (95% - 100%)    Parameters below as of 15 May 2024 15:25  Patient On (Oxygen Delivery Method): nasal cannula  O2 Flow (L/min): 2    2                                              REVIEW OF SYSTEMS:    Constitutional: No fever, weight loss or fatigue  Eyes: No eye pain, visual disturbances, or discharge  ENT:  No difficulty hearing, tinnitus, vertigo; No sinus or throat pain  Neck: No pain or stiffness  Respiratory: No wheezing, chills or hemoptysis  Cardiovascular: No chest pain, palpitations, shortness of breath, dizziness  Gastrointestinal: No abdominal or epigastric pain. No nausea, vomiting or hematemesis  Genitourinary: No dysuria, frequency, hematuria or incontinence  Neurological: No headaches, numbness or tremors  Psychiatric: No depression, anxiety, mood swings or difficulty sleeping  Musculoskeletal: No joint pain or swelling; No muscle, back or extremity pain  Skin: No itching, burning, rashes or lesions   Lymph Nodes: No enlarged glands  Endocrine: No heat or cold intolerance; No hair loss,   Allergy and Immunologic: No hives or eczema    On Neurological Examination:    Mental Status - Pt is alert, awake, oriented X3.. Follows commands well and able to answer questions appropriately.Mood and affect  normal    Speech -  aphasia of mixed type    Cranial Nerves - Pupils 3 mm equal and reactive to light, extraocular eye movements intact. Pt has no visual field deficit.  Pt has right facial weakness  . Facial sensation is intact.Tongue - is in midline.    Muscle tone - is decreased on right  .      Motor Exam -right hemiparesis; RUE 1/5; LE 1-2/5   No shaking or tremors.    Sensory Exam -. Pt withdraws all extremities equally on stimulation. No asymmetry seen. No complaints of tingling, numbness.        coordination:    Finger to nose: normal-left        Deep tendon Reflexes - 2 plus all over.            Neck Supple -  Yes.      MEDICATIONS  (STANDING):    albuterol/ipratropium for Nebulization 3 milliLiter(s) Nebulizer every 6 hours  aMIOdarone    Tablet 400  Oral   aMIOdarone    Tablet 200 milliGRAM(s) Oral daily  apixaban 2.5 milliGRAM(s) Oral every 12 hours  aspirin  chewable 81 milliGRAM(s) Enteral Tube daily  atorvastatin 80 milliGRAM(s) Oral at bedtime  buDESOnide    Inhalation Suspension 0.5 milliGRAM(s) Inhalation two times a day  ciprofloxacin     Tablet 500 milliGRAM(s) Oral every 12 hours  dextrose 10% Bolus 125 milliLiter(s) IV Bolus once  dextrose 5%. 1000 milliLiter(s) (100 mL/Hr) IV Continuous <Continuous>  dextrose 5%. 1000 milliLiter(s) (50 mL/Hr) IV Continuous <Continuous>  dextrose 5%. 1000 milliLiter(s) (50 mL/Hr) IV Continuous <Continuous>  dextrose 50% Injectable 25 Gram(s) IV Push once  dextrose 50% Injectable 12.5 Gram(s) IV Push once  dronabinol 2.5 milliGRAM(s) Oral two times a day  glucagon  Injectable 1 milliGRAM(s) IntraMuscular once  insulin glargine Injectable (LANTUS) 15 Unit(s) SubCutaneous at bedtime  insulin lispro (ADMELOG) corrective regimen sliding scale   SubCutaneous three times a day before meals  insulin lispro (ADMELOG) corrective regimen sliding scale   SubCutaneous at bedtime  metoprolol tartrate 25 milliGRAM(s) Oral two times a day  montelukast 10 milliGRAM(s) Oral daily  nystatin    Suspension [id number] Unit(s) Oral three times a day    MEDICATIONS  (PRN):  acetaminophen   Oral Liquid .. 650 milliGRAM(s) Oral every 6 hours PRN Temp greater or equal to 38.5C (101.3F)  dextrose Oral Gel 15 Gram(s) Oral once PRN Blood Glucose LESS THAN 70 milliGRAM(s)/deciliter              Allergies    No Known Allergies    Intolerances                          05-15    145  |  108  |  21  ----------------------------<  191<H>  3.5   |  34<H>  |  1.60<H>    Ca    9.4      15 May 2024 06:57  Phos  2.6     05-15  Mg     2.1     05-15                Hemoglobin A1C:     Vitamin B12     RADIOLOGY    ASSESSMENT AND PLAN:      Seen for right sided weakness/ams  consistent subacute left mca infarct(missed one dose of apixaban)  also multiple punctate subacute cerebellar infarcts  most likely cardio-embolic     PEG today  aspiration precaution  ON AC and asa  continue statin  Physical therapy evaluation.  Pain is accessed and addressed.  Plan of care was discussed with family(wife_. Questions answered.  Would continue to follow.

## 2024-05-15 NOTE — PROGRESS NOTE ADULT - SUBJECTIVE AND OBJECTIVE BOX
Date/Time Patient Seen:  		  Referring MD:   Data Reviewed	       Patient is a 84y old  Male who presents with a chief complaint of AFib w/ RVR (15 May 2024 11:50)      Subjective/HPI     PAST MEDICAL & SURGICAL HISTORY:  Diabetes Mellitus, Type II    CAD (Coronary Artery Disease)  s/p 3v CABG 2004; stents placed in [place] in 2019    Dyslipidemia    Asymptomatic Peripheral Vascular Disease    Osteomyelitis    COPD (chronic obstructive pulmonary disease)  on 2L at home and BiPAP at night; intubated 6/18    Diabetes mellitus    Hypertension    PVD (peripheral vascular disease)    History of PAT (paroxysmal atrial tachycardia)    Asthma with COPD    BPH (benign prostatic hyperplasia)    Acute osteomyelitis    CABG (Coronary Artery Bypass Graft)  2004    Compound fracture  left leg    S/P primary angioplasty with coronary stent    H/O drainage of abscess  Left femur 12/2021          Medication list         MEDICATIONS  (STANDING):  albuterol/ipratropium for Nebulization 3 milliLiter(s) Nebulizer every 6 hours  aMIOdarone    Tablet 400  Oral   aMIOdarone    Tablet 200 milliGRAM(s) Oral daily  aspirin  chewable 81 milliGRAM(s) Enteral Tube daily  atorvastatin 80 milliGRAM(s) Oral at bedtime  buDESOnide    Inhalation Suspension 0.5 milliGRAM(s) Inhalation two times a day  ciprofloxacin     Tablet 500 milliGRAM(s) Oral every 12 hours  dextrose 10% Bolus 125 milliLiter(s) IV Bolus once  dextrose 5%. 1000 milliLiter(s) (50 mL/Hr) IV Continuous <Continuous>  dextrose 5%. 1000 milliLiter(s) (100 mL/Hr) IV Continuous <Continuous>  dextrose 50% Injectable 25 Gram(s) IV Push once  dextrose 50% Injectable 12.5 Gram(s) IV Push once  dronabinol 2.5 milliGRAM(s) Oral two times a day  glucagon  Injectable 1 milliGRAM(s) IntraMuscular once  insulin glargine Injectable (LANTUS) 15 Unit(s) SubCutaneous at bedtime  insulin lispro (ADMELOG) corrective regimen sliding scale   SubCutaneous every 6 hours  metoprolol tartrate 25 milliGRAM(s) Oral two times a day  montelukast 10 milliGRAM(s) Oral daily  nystatin    Suspension [id number] Unit(s) Oral three times a day    MEDICATIONS  (PRN):  acetaminophen   Oral Liquid .. 650 milliGRAM(s) Oral every 6 hours PRN Temp greater or equal to 38.5C (101.3F)  dextrose Oral Gel 15 Gram(s) Oral once PRN Blood Glucose LESS THAN 70 milliGRAM(s)/deciliter         Vitals log        ICU Vital Signs Last 24 Hrs  T(C): 36.4 (15 May 2024 11:18), Max: 36.6 (14 May 2024 20:57)  T(F): 97.6 (15 May 2024 11:18), Max: 97.9 (14 May 2024 20:57)  HR: 90 (15 May 2024 11:18) (84 - 92)  BP: 110/60 (15 May 2024 11:18) (103/63 - 130/67)  BP(mean): --  ABP: --  ABP(mean): --  RR: 18 (15 May 2024 11:18) (18 - 19)  SpO2: 95% (15 May 2024 11:18) (95% - 97%)    O2 Parameters below as of 15 May 2024 11:18  Patient On (Oxygen Delivery Method): nasal cannula  O2 Flow (L/min): 3               Input and Output:  I&O's Detail    14 May 2024 07:01  -  15 May 2024 07:00  --------------------------------------------------------  IN:  Total IN: 0 mL    OUT:    Incontinent per Condom Catheter (mL): 400 mL  Total OUT: 400 mL    Total NET: -400 mL          Lab Data                        11.9   6.18  )-----------( 330      ( 15 May 2024 06:57 )             38.8     05-15    145  |  108  |  21  ----------------------------<  191<H>  3.5   |  34<H>  |  1.60<H>    Ca    9.4      15 May 2024 06:57  Phos  2.6     05-15  Mg     2.1     05-15              Review of Systems	      Objective     Physical Examination    heart s1s2  lung dec BS  head nc  head at      Pertinent Lab findings & Imaging      Eliecer:  NO   Adequate UO     I&O's Detail    14 May 2024 07:01  -  15 May 2024 07:00  --------------------------------------------------------  IN:  Total IN: 0 mL    OUT:    Incontinent per Condom Catheter (mL): 400 mL  Total OUT: 400 mL    Total NET: -400 mL               Discussed with:     Cultures:	        Radiology

## 2024-05-15 NOTE — PROGRESS NOTE ADULT - PROBLEM SELECTOR PLAN 4
Chronic, CKD3 baseline Cr 1.1  - Hypernatremia improving, will monitor off IVF  - Cr stable, continue to monitor  - Continue to hold home furosemide  - Monitor volume status closely  - Nephrology (Dr. Regan) following, recs appreciated Chronic, CKD3 baseline Cr 1.1  - Hypernatremia improving, will monitor off IVF  - Cr stable, continue to monitor  - Continue to hold home furosemide  - Monitor volume status closely  - Nephrology (Dr. Regan) following,

## 2024-05-15 NOTE — SOCIAL WORK PROGRESS NOTE - NSSWPROGRESSNOTE_GEN_ALL_CORE
Discussed at daily rounds. Requested updated KODI to reflect new peg which is being placed this afternoon. Chema  will request auth for [place] through the [employer] portal. Social work to follow.

## 2024-05-15 NOTE — PROGRESS NOTE ADULT - SUBJECTIVE AND OBJECTIVE BOX
[facility] -- Génesis Villavicencio Pannella, Patel, Carolina Heath Cohen  Office # [phone]    Follow Up:  CAD, AF, CVA    Subjective/Observations: seen and examined, awake in bed, NAD, unable to provide meaningful information at this time. on O2 via NC, no events overnight      REVIEW OF SYSTEMS: All other review of systems is negative unless indicated above  PAST MEDICAL & SURGICAL HISTORY:  Diabetes Mellitus, Type II      CAD (Coronary Artery Disease)  s/p 3v CABG 2004; stents placed in [place] in 2019      Dyslipidemia      Osteomyelitis      COPD (chronic obstructive pulmonary disease)  on 2L at home and BiPAP at night; intubated 6/18      Hypertension      PVD (peripheral vascular disease)      History of PAT (paroxysmal atrial tachycardia)      Asthma with COPD      BPH (benign prostatic hyperplasia)      Acute osteomyelitis      CABG (Coronary Artery Bypass Graft)  2004      Compound fracture  left leg      S/P primary angioplasty with coronary stent      H/O drainage of abscess  Left femur 12/2021        MEDICATIONS  (STANDING):  albuterol/ipratropium for Nebulization 3 milliLiter(s) Nebulizer every 6 hours  aMIOdarone    Tablet 400  Oral   aMIOdarone    Tablet 200 milliGRAM(s) Oral daily  aspirin  chewable 81 milliGRAM(s) Enteral Tube daily  atorvastatin 80 milliGRAM(s) Oral at bedtime  buDESOnide    Inhalation Suspension 0.5 milliGRAM(s) Inhalation two times a day  ciprofloxacin     Tablet 500 milliGRAM(s) Oral every 12 hours  dextrose 10% Bolus 125 milliLiter(s) IV Bolus once  dextrose 5%. 1000 milliLiter(s) (100 mL/Hr) IV Continuous <Continuous>  dextrose 5%. 1000 milliLiter(s) (50 mL/Hr) IV Continuous <Continuous>  dextrose 50% Injectable 25 Gram(s) IV Push once  dextrose 50% Injectable 12.5 Gram(s) IV Push once  dronabinol 2.5 milliGRAM(s) Oral two times a day  glucagon  Injectable 1 milliGRAM(s) IntraMuscular once  insulin glargine Injectable (LANTUS) 15 Unit(s) SubCutaneous at bedtime  insulin lispro (ADMELOG) corrective regimen sliding scale   SubCutaneous at bedtime  insulin lispro (ADMELOG) corrective regimen sliding scale   SubCutaneous three times a day before meals  metoprolol tartrate 25 milliGRAM(s) Oral two times a day  montelukast 10 milliGRAM(s) Oral daily  nystatin    Suspension 285030 Unit(s) Oral three times a day    MEDICATIONS  (PRN):  acetaminophen   Oral Liquid .. 650 milliGRAM(s) Oral every 6 hours PRN Temp greater or equal to 38.5C (101.3F)  dextrose Oral Gel 15 Gram(s) Oral once PRN Blood Glucose LESS THAN 70 milliGRAM(s)/deciliter    Allergies    No Known Allergies    Intolerances      Vital Signs Last 24 Hrs  T(C): 36.4 (15 May 2024 11:18), Max: 36.6 (14 May 2024 20:57)  T(F): 97.6 (15 May 2024 11:18), Max: 97.9 (14 May 2024 20:57)  HR: 90 (15 May 2024 11:18) (84 - 92)  BP: 110/60 (15 May 2024 11:18) (103/63 - 132/77)  BP(mean): --  RR: 18 (15 May 2024 11:18) (18 - 19)  SpO2: 95% (15 May 2024 11:18) (95% - 97%)    Parameters below as of 15 May 2024 11:18  Patient On (Oxygen Delivery Method): nasal cannula  O2 Flow (L/min): 3    I&O's Summary    14 May 2024 07:01  -  15 May 2024 07:00  --------------------------------------------------------  IN: 0 mL / OUT: 400 mL / NET: -400 mL        TELE: Not on telemetry   PHYSICAL EXAM:  Constitutional: NAD, awake and alert  HEENT: Moist Mucous Membranes, Anicteric  Pulmonary: Non-labored, mild exp wheezing, rales or rhonchi  Cardiovascular: IRRR, S1 and S2, No murmurs, rubs, gallops or clicks  Gastrointestinal: Bowel Sounds present, soft, nontender.   Lymph: + b/l UE edema. No lymphadenopathy.  Skin: No visible rashes or ulcers.  Psych:  Mood & affect appropriate  LABS: All Labs Reviewed:                                   11.9   6.18  )-----------( 330      ( 15 May 2024 06:57 )             38.8                         11.6   6.09  )-----------( 318      ( 14 May 2024 08:03 )             39.4                         12.6   7.22  )-----------( 371      ( 13 May 2024 06:52 )             41.8     15 May 2024 06:57    145    |  108    |  21     ----------------------------<  191    3.5     |  34     |  1.60   14 May 2024 08:03    146    |  111    |  21     ----------------------------<  202    3.5     |  32     |  1.50   13 May 2024 06:52    146    |  111    |  23     ----------------------------<  165    3.4     |  33     |  1.60     Ca    9.4        15 May 2024 06:57  Ca    9.5        14 May 2024 08:03  Ca    9.9        13 May 2024 06:52  Phos  2.6       15 May 2024 06:57  Phos  2.6       14 May 2024 08:03  Phos  2.6       13 May 2024 06:52  Mg     2.1       15 May 2024 06:57  Mg     2.0       14 May 2024 08:03  Mg     2.1       13 May 2024 06:52    TPro  6.4    /  Alb  2.7    /  TBili  0.6    /  DBili  x      /  AST  22     /  ALT  20     /  AlkPhos  101    13 May 2024 06:52    PT/INR - ( 14 May 2024 08:03 )   PT: 13.7 sec;   INR: 1.18 ratio         PTT - ( 14 May 2024 08:03 )  PTT:39.1 sec      12 Lead ECG:   Ventricular Rate 95 BPM    Atrial Rate 95 BPM    P-R Interval 136 ms    QRS Duration 86 ms    Q-T Interval 352 ms    QTC Calculation(Bazett) 442 ms    P Axis 84 degrees    R Axis 88 degrees    T Axis 58 degrees    Diagnosis Line Normal sinus rhythm  Low voltage QRS  possible BREANNA  Borderline ECG    Confirmed by Cookie Ordaz ([id number]) on 5/2/2024 2:38:25 PM (05-02-24 @ 09:49)      TRANSTHORACIC ECHOCARDIOGRAM REPORT  ________________________________________________________________________________                                      _______       Pt. Name:       YUE COTTO Study Date:    4/26/2024  MRN:            [id number]            YOB: 1940  Accession #:    [id number]           Age:           83 years  Account#:       [id number]          Gender:        M  Visit ID#  Heart Rate:                         Height:        70.08 in (178.00 cm)  Rhythm:                            Weight:        220.46 lb (100.00 kg)  Blood Pressure: 158/69 mmHg         BSA/BMI:       2.18 m² / 31.56 kg/m²  ________________________________________________________________________________________  Referring Physician:   [id number] Marc Grossman  Interpreting Physician: Cookie Ordaz MD  Primary Sonographer:    Clayton Wilkinson    CPT:               ECHO TTE WO CON COMP W DOPP - 33174.m  Indication(s):     Unspecified atrial fibrillation - I48.91  Procedure:         Transthoracic echocardiogram with 2-D, M-mode and complete                     spectral and color flow Doppler.  Ordering Location: [facility]  Admission Status:  Inpatient  Study Information: Image quality for this study is technically difficult.    _______________________________________________________________________________________     CONCLUSIONS:      1. Technically difficult image quality.   2. The LV is not well visualized, however the LV function is probably normal based on limited views.   3. The right ventricle is not well visualized. probably normal systolic function.   4. There is moderate thickening of the aortic valve leaflets.   5. Structurally normal mitral valve with normal leaflet excursion.   6. Trace tricuspid regurgitation.   7. The inferior vena cava is dilated measuring 2.18 cm in diameter, (dilated >2.1cm) with normal inspiratory collapse (normal >50%) consistent with mildly elevated right atrial pressure (~8, range 5-10mmHg).   8. Estimated pulmonary artery systolic pressure is 26 mmHg, consistent with normal pulmonary artery pressure.    ________________________________________________________________________________________  FINDINGS:     Left Ventricle:  There is normal left ventricular diastolic function. Left ventricular endocardium is not well visualized.     Right Ventricle:  The right ventricle is not well visualized. Probably normal systolic function.     Left Atrium:  The left atrium is normal in size with an indexed volume of 16.30 ml/m².     Right Atrium:  The right atrium is normal in size.     Aortic Valve:  The aortic valve anatomy cannot be determined with normal systolic excursion. There is mild calcification of the aortic valve leaflets. There is moderate thickening of the aortic valve leaflets.     Mitral Valve:  Structurally normal mitral valve with normal leaflet excursion. There is trace mitral regurgitation.     Tricuspid Valve:  The tricuspid valve was not well visualized. There is trace tricuspid regurgitation. Estimated pulmonary artery systolic pressure is 26 mmHg, consistent with normal pulmonary artery pressure.     Pulmonic Valve:  The pulmonic valve was not well visualized.     Aorta:  The aortic root at the sinuses of Valsalva is normal in size, measuring 3.30 cm (indexed 1.52 cm/m²).     Systemic Veins:  The inferior vena cava is dilated measuring 2.18 cm in diameter, (dilated >2.1cm) with normal inspiratory collapse (normal >50%) consistent with mildly elevated right atrial pressure (~8, range 5-10mmHg).  ____________________________________________________________________  QUANTITATIVE DATA:  Left Ventricle Measurements: (Indexed to BSA)     IVSd (2D):   1.1 cm  LVPWd (2D):  0.9 cm  LVIDd (2D):  4.0 cm  LVIDs (2D):  2.8 cm  LV Mass:     129 g  59.1 g/m²     MVE Vmax:    0.59 m/s  MV A Vmax:    0.85 m/s  MV E/A:       0.69  e' lateral:   6.96 cm/s  e' medial:    5.98 cm/s  E/e' lateral: 8.43  E/e' medial:  9.82  E/e' Average: 9.07  MV DT:        195 msec    Aorta Measurements: (Normal range) (Indexed to BSA)     Sinuses of Valsalva: 3.30 cm (3.1 - 3.7 cm)       Left Atrium Measurements: (Indexed to BSA)  LA Diam 2D: 3.30 cm       LVOT / RVOT/ Qp/Qs Data: (Indexed to BSA)  LVOT Diameter: 1.80 cm  LVOT Area:     2.54 cm²    Mitral Valve Measurements:     MV E Vmax: 0.6 m/s  MV A Vmax: 0.8 m/s  MV E/A:    0.7       Tricuspid Valve Measurements:     TR Vmax:          2.1 m/s  TR Peak Gradient: 18.1 mmHg  RA Pressure:      8 mmHg  PASP:             26 mmHg    ________________________________________________________________________________________  Electronically signed on 4/26/2024 at 4:46:45 PM by Cookie Ordaz MD         *** Final ***

## 2024-05-15 NOTE — PROGRESS NOTE ADULT - PROBLEM SELECTOR PLAN 10
- Lovenox 90 mg, last dose yesterday PM  - Holding home eliquis for PEG placement today, will resume after     #GOC:  - DNR/DNI  - Plan for PEG today    #Dispo  - SW following, accepted to [facility] (Friday)

## 2024-05-15 NOTE — PROGRESS NOTE ADULT - PROBLEM SELECTOR PLAN 3
Chronic, on home insulin (Lantus 10u)  - c/w MDISS and Lantus 15 units sQ QHS   - Hypoglycemia protocol in place Chronic, on home insulin (Lantus 10u)   hb a1c 6.8  - c/w MDISS and Lantus 15 units sQ QHS   - Hypoglycemia protocol in place

## 2024-05-15 NOTE — PROGRESS NOTE ADULT - PROBLEM SELECTOR PLAN 1
MRI Head: Posterior left MCA vascular ischemic stroke. No hemorrhagic conversion, repeat CTs w/ no acute change  - Most likely cardio-embolic  - C/w ASA and lipitor qd  - Hold eliquis for PEG placement today, will resume after procedure  - Plan for PEG 2/2 poor calorie intake. F/u nutrition recs for tube feeds s/p placement  - c/w marinol 2.5mg BID   - c/w neuro checks and fall/aspiration precautions   - Physiatry following, recs appreciated  - Neuro (Dr. Underwood) following, recs appreciated MRI Head: Posterior left MCA vascular ischemic stroke. No hemorrhagic conversion, repeat CTs w/ no acute change  - Most likely cardio-embolic  - C/w ASA and lipitor qd  - Hold eliquis for PEG placement today, will resume after procedure  - Plan for PEG 2/2 poor calorie intake. F/u nutrition recs for tube feeds s/p placement  - c/w marinol 2.5mg BID   - c/w neuro checks and fall/aspiration precautions   - Physiatry following, recs appreciated  - Neuro (Dr. Underwood) following,

## 2024-05-16 NOTE — PROGRESS NOTE ADULT - PROBLEM SELECTOR PLAN 3
Chronic, on home insulin (Lantus 10u)  - A1C 6.8  - c/w MDISS and Lantus 15 units sQ QHS   - Hypoglycemia protocol in place

## 2024-05-16 NOTE — CHART NOTE - NSCHARTNOTESELECT_GEN_ALL_CORE
Nutrition Services
Rapid Response/Event Note
Event Note
Nutrition Services

## 2024-05-16 NOTE — PROGRESS NOTE ADULT - NUTRITIONAL ASSESSMENT
This patient has been assessed with a concern for Malnutrition and has been determined to have a diagnosis/diagnoses of Severe protein-calorie malnutrition.    This patient is being managed with:   Diet NPO after Midnight-     NPO Start Date: 14-May-2024   NPO Start Time: 23:59  Except Medications  Entered: May 14 2024  9:36AM    Diet Pureed-  Consistent Carbohydrate {No Snacks}  Mildly Thick Liquids (MILDTHICKLIQS)  Liquid via Teaspoon Only  Supplement Feeding Modality:  Oral  Glucerna Shake Cans or Servings Per Day:  1       Frequency:  Two Times a day  Entered: May 13 2024  9:34AM  
This patient has been assessed with a concern for Malnutrition and has been determined to have a diagnosis/diagnoses of Severe protein-calorie malnutrition.    This patient is being managed with:   Diet NPO-  Except Medications  Entered: May 15 2024  5:59PM  
This patient has been assessed with a concern for Malnutrition and has been determined to have a diagnosis/diagnoses of Severe protein-calorie malnutrition.    This patient is being managed with:   Diet Pureed-  Consistent Carbohydrate {No Snacks}  Mildly Thick Liquids (MILDTHICKLIQS)  Liquid via Teaspoon Only  Supplement Feeding Modality:  Oral  Glucerna Shake Cans or Servings Per Day:  1       Frequency:  Two Times a day  Entered: May 13 2024  9:34AM

## 2024-05-16 NOTE — PROGRESS NOTE ADULT - SUBJECTIVE AND OBJECTIVE BOX
[facility]  Rich Sky PA-C  [address] [place] [address]  [phone]      INTERVAL HPI/OVERNIGHT EVENTS:  Pt s/e  +peg    MEDICATIONS  (STANDING):  albuterol/ipratropium for Nebulization 3 milliLiter(s) Nebulizer every 6 hours  aMIOdarone    Tablet 400  Oral   aMIOdarone    Tablet 200 milliGRAM(s) Oral daily  aspirin  chewable 81 milliGRAM(s) Enteral Tube daily  atorvastatin 80 milliGRAM(s) Oral at bedtime  buDESOnide    Inhalation Suspension 0.5 milliGRAM(s) Inhalation two times a day  ciprofloxacin     Tablet 500 milliGRAM(s) Oral every 12 hours  dextrose 10% Bolus 125 milliLiter(s) IV Bolus once  dextrose 5% + sodium chloride 0.9%. 1000 milliLiter(s) (75 mL/Hr) IV Continuous <Continuous>  dextrose 5%. 1000 milliLiter(s) (50 mL/Hr) IV Continuous <Continuous>  dextrose 5%. 1000 milliLiter(s) (100 mL/Hr) IV Continuous <Continuous>  dextrose 50% Injectable 12.5 Gram(s) IV Push once  dextrose 50% Injectable 25 Gram(s) IV Push once  glucagon  Injectable 1 milliGRAM(s) IntraMuscular once  insulin glargine Injectable (LANTUS) 15 Unit(s) SubCutaneous at bedtime  insulin lispro (ADMELOG) corrective regimen sliding scale   SubCutaneous every 6 hours  metoprolol tartrate 25 milliGRAM(s) Oral two times a day  montelukast 10 milliGRAM(s) Oral daily  nystatin    Suspension 613230 Unit(s) Oral three times a day  potassium phosphate IVPB 30 milliMole(s) IV Intermittent once    MEDICATIONS  (PRN):  acetaminophen   Oral Liquid .. 650 milliGRAM(s) Oral every 6 hours PRN Temp greater or equal to 38.5C (101.3F)  dextrose Oral Gel 15 Gram(s) Oral once PRN Blood Glucose LESS THAN 70 milliGRAM(s)/deciliter      Allergies  No Known Allergies      PHYSICAL EXAM:   Vital Signs:  Vital Signs Last 24 Hrs  T(C): 36.6 (16 May 2024 04:42), Max: 36.9 (15 May 2024 13:56)  T(F): 97.8 (16 May 2024 04:42), Max: 98.4 (15 May 2024 13:56)  HR: 90 (16 May 2024 09:07) (78 - 99)  BP: 137/89 (16 May 2024 04:42) (110/60 - 137/89)  BP(mean): --  RR: 17 (16 May 2024 04:42) (14 - 19)  SpO2: 96% (16 May 2024 09:07) (95% - 100%)    Parameters below as of 16 May 2024 09:07  Patient On (Oxygen Delivery Method): nasal cannula, 3LPM      Daily     Daily Weight in k.2 (16 May 2024 04:42)    GENERAL:  Appears stated age  HEENT:  NC/AT  CHEST:  Full & symmetric excursion  HEART:  Regular rhythm  ABDOMEN:  Soft, non-tender, non-distended, +peg  EXTEREMITIES:  no cyanosis  SKIN:  No rash  NEURO:  Alert      LABS:                        11.4   9.21  )-----------( 314      ( 16 May 2024 07:41 )             37.5     05-16    144  |  110<H>  |  15  ----------------------------<  169<H>  3.6   |  32<H>  |  1.30    Ca    9.5      16 May 2024 07:41  Phos  2.1     05-16  Mg     1.7     05-16        Urinalysis Basic - ( 16 May 2024 07:41 )    Color: x / Appearance: x / SG: x / pH: x  Gluc: 169 mg/dL / Ketone: x  / Bili: x / Urobili: x   Blood: x / Protein: x / Nitrite: x   Leuk Esterase: x / RBC: x / WBC x   Sq Epi: x / Non Sq Epi: x / Bacteria: x

## 2024-05-16 NOTE — PROGRESS NOTE ADULT - ASSESSMENT
83-year-old M with history of COPD on home O2 PRN, coronary artery disease s/p CABG,  hypertension, diabetes, hyperlipidemia, atrial fibrillation on Eliquis as well as chronic osteomyelitis who presents to the emergency department at [facility] complaining of rapid heart rate. Cardiology consulted for afib with rvr.    CAD, CABG, HTN, HLD, A fib with RVR, Acute CVA  - p/w PAF with RVR with SOB at home in ED s/p Cardizem gtt  - Now rate controlled.  s/p Amio load of 5Gm.  Continue 200 mg PO daily  - Continue Eliquis, though, would benefit more with full dose.  Creatinine normal today but has been labile (1.5-1.6)  - Continue BB    - CT brain: moderate-sized evolving acute/subacute L MCA territory infarct but no hemorrhagic conversion.   - Neuro following   - Continue ASA and high intensity statin   - TTE (4/26) Technically difficult image quality, probably normal based on limited views    - BP, HR stable and controlled per flow sheet   - Monitor and replete Lytes. Keep K > 4 and Mg > 2    - GI following, s/p Peg placement, 5/16.  tolerated procedure well  - DC planning to [facility] per primary   - Will continue to follow.  Otherwise, stable from cardiac standpoint    Dena Maynard DNP, NP-C, AGACNP-C  Cardiology   Call TEAMS        83-year-old M with history of COPD on home O2 PRN, coronary artery disease s/p CABG,  hypertension, diabetes, hyperlipidemia, atrial fibrillation on Eliquis as well as chronic osteomyelitis who presents to the emergency department at [facility] complaining of rapid heart rate. Cardiology consulted for afib with rvr.    CAD, CABG, HTN, HLD, A fib with RVR, Acute CVA  - p/w PAF with RVR with SOB at home in ED s/p Cardizem gtt  - Now rate controlled.  s/p Amio load of 5Gm.  Continue 200 mg PO daily  - Continue Eliquis, though should be on 5mg BID based on weight and renal functionCreatinine normal today but has been labile (1.5-1.6)  - Continue BB    - CT brain: moderate-sized evolving acute/subacute L MCA territory infarct but no hemorrhagic conversion.   - Neuro following   - Continue ASA and high intensity statin   - TTE (4/26) Technically difficult image quality, probably normal based on limited views    - BP, HR stable and controlled per flow sheet   - Monitor and replete Lytes. Keep K > 4 and Mg > 2    - GI following, s/p Peg placement, 5/16.  tolerated procedure well  - DC planning to [facility] per primary   - Will continue to follow.  Otherwise, stable from cardiac standpoint    Dena Maynard DNP, NP-C, AGACNP-C  Cardiology   Call TEAMS

## 2024-05-16 NOTE — PROGRESS NOTE ADULT - SUBJECTIVE AND OBJECTIVE BOX
Neurology Follow up note    YUE [id number]    HPI:  83yoM PMHx AFib on Eliquis, CAD, HTN, DM, HLD, COPD, osteomyelitis presents c/o shortness of breath, chest discomfort. Pt reports onset of rapid heart rate this morning, HR measured 160s when he checked his pulse ox. Also endorses dyspnea exacerbated by movement. Pt reports last episode of AFib in 2020, no episodes since then until today's presentation. Pt states that chest discomfort feels like chest pressure, no chest pain. Pt also reports chronic R big toe wound for the past few months, follows w/ [facility]. States that he has increased sensitivity to R sole of foot, but denies pain, numbness/tingling. Denies fevers, chills, abdominal pain, N/V/D/C.     IN THE ED:  Temp 98  F ,  , /81  ,RR 18 , SpO2 94%  S/P PO , IV diltiazem 10mg x2, IV diltiazem gtt @ 10 mg/hr  Labs significant for BUN/Cr 25/1.6, troponin 507.9, pBNP 355  Imaging:   CXR wet read: no gross abnormalities (25 Apr 2024 12:13)      Interval History -no new events    Patient is seen, chart was reviewed and case was discussed with the treatment team.  Pt is not in any distress.   Lying on bed comfortably.     Vital Signs Last 24 Hrs  T(C): 36.8 (16 May 2024 12:26), Max: 36.8 (16 May 2024 12:26)  T(F): 98.2 (16 May 2024 12:26), Max: 98.2 (16 May 2024 12:26)  HR: 91 (16 May 2024 17:45) (80 - 99)  BP: 150/81 (16 May 2024 17:45) (112/69 - 150/81)  BP(mean): --  RR: 18 (16 May 2024 12:26) (17 - 18)  SpO2: 94% (16 May 2024 13:24) (94% - 99%)    Parameters below as of 16 May 2024 13:24  Patient On (Oxygen Delivery Method): nasal cannula, 3LPM                    REVIEW OF SYSTEMS:    Constitutional: No fever, weight loss or fatigue  Eyes: No eye pain, visual disturbances, or discharge  ENT:  No difficulty hearing, tinnitus, vertigo; No sinus or throat pain  Neck: No pain or stiffness  Respiratory: No wheezing, chills or hemoptysis  Cardiovascular: No chest pain, palpitations, shortness of breath, dizziness  Gastrointestinal: No abdominal or epigastric pain. No nausea, vomiting or hematemesis  Genitourinary: No dysuria, frequency, hematuria or incontinence  Neurological: No headaches, numbness or tremors  Psychiatric: No depression, anxiety, mood swings or difficulty sleeping  Musculoskeletal: No joint pain or swelling; No muscle, back or extremity pain  Skin: No itching, burning, rashes or lesions   Lymph Nodes: No enlarged glands  Endocrine: No heat or cold intolerance; No hair loss,   Allergy and Immunologic: No hives or eczema    On Neurological Examination:    Mental Status - Pt is alert, awake, oriented X3.. Follows commands well and able to answer questions appropriately.Mood and affect  normal    Speech -  aphasia of mixed type    Cranial Nerves - Pupils 3 mm equal and reactive to light, extraocular eye movements intact. Pt has no visual field deficit.  Pt has right facial weakness  . Facial sensation is intact.Tongue - is in midline.    Muscle tone - is decreased on right  .      Motor Exam -right hemiparesis; RUE 1/5; LE 1-2/5   No shaking or tremors.    Sensory Exam -. Pt withdraws all extremities equally on stimulation. No asymmetry seen. No complaints of tingling, numbness.        coordination:    Finger to nose: normal-left        Deep tendon Reflexes - 2 plus all over.            Neck Supple -  Yes.      MEDICATIONS  (STANDING):  albuterol/ipratropium for Nebulization 3 milliLiter(s) Nebulizer every 6 hours  aMIOdarone    Tablet 400  Oral   aMIOdarone    Tablet 200 milliGRAM(s) Oral daily  apixaban 2.5 milliGRAM(s) Oral two times a day  aspirin  chewable 81 milliGRAM(s) Enteral Tube daily  atorvastatin 80 milliGRAM(s) Oral at bedtime  buDESOnide    Inhalation Suspension 0.5 milliGRAM(s) Inhalation two times a day  ciprofloxacin     Tablet 500 milliGRAM(s) Oral every 12 hours  dextrose 10% Bolus 125 milliLiter(s) IV Bolus once  dextrose 5%. 1000 milliLiter(s) (100 mL/Hr) IV Continuous <Continuous>  dextrose 5%. 1000 milliLiter(s) (50 mL/Hr) IV Continuous <Continuous>  dextrose 50% Injectable 25 Gram(s) IV Push once  dextrose 50% Injectable 12.5 Gram(s) IV Push once  glucagon  Injectable 1 milliGRAM(s) IntraMuscular once  insulin glargine Injectable (LANTUS) 15 Unit(s) SubCutaneous at bedtime  insulin lispro (ADMELOG) corrective regimen sliding scale   SubCutaneous every 6 hours  metoprolol tartrate 25 milliGRAM(s) Oral two times a day  montelukast 10 milliGRAM(s) Oral daily  nystatin    Suspension 430648 Unit(s) Oral three times a day    MEDICATIONS  (PRN):  acetaminophen   Oral Liquid .. 650 milliGRAM(s) Oral every 6 hours PRN Temp greater or equal to 38.5C (101.3F)  dextrose Oral Gel 15 Gram(s) Oral once PRN Blood Glucose LESS THAN 70 milliGRAM(s)/deciliter              Allergies    No Known Allergies    Intolerances                                     11.4   9.21  )-----------( 314      ( 16 May 2024 07:41 )             37.5                 Hemoglobin A1C:     Vitamin B12     RADIOLOGY    ASSESSMENT AND PLAN:      Seen for right sided weakness/ams  consistent subacute left mca infarct(missed one dose of apixaban)  also multiple punctate subacute cerebellar infarcts  most likely cardio-embolic  sp PEG    FOR SUSI TOMORROW  aspiration precaution  ON AC and asa  continue statin  Physical therapy evaluation.  Pain is accessed and addressed.  Plan of care was discussed with family(wife_. Questions answered.  Would continue to follow.

## 2024-05-16 NOTE — PROGRESS NOTE ADULT - SUBJECTIVE AND OBJECTIVE BOX
Patient is a 84y old  Male who presents with a chief complaint of AFib w/ RVR (15 May 2024 18:24)      INTERVAL HPI/OVERNIGHT EVENTS: Patient seen and examined at bedside. No overnight events occurred. Pt s/p PEG placement yesterday, plan to start tube feeds pending nutrition recs. Pt seems more awake and alert, able to verbalize words rather than moan.     MEDICATIONS  (STANDING):  albuterol/ipratropium for Nebulization 3 milliLiter(s) Nebulizer every 6 hours  aMIOdarone    Tablet 400  Oral   aMIOdarone    Tablet 200 milliGRAM(s) Oral daily  aspirin  chewable 81 milliGRAM(s) Enteral Tube daily  atorvastatin 80 milliGRAM(s) Oral at bedtime  buDESOnide    Inhalation Suspension 0.5 milliGRAM(s) Inhalation two times a day  ciprofloxacin     Tablet 500 milliGRAM(s) Oral every 12 hours  dextrose 10% Bolus 125 milliLiter(s) IV Bolus once  dextrose 5% + sodium chloride 0.9%. 1000 milliLiter(s) (75 mL/Hr) IV Continuous <Continuous>  dextrose 5%. 1000 milliLiter(s) (100 mL/Hr) IV Continuous <Continuous>  dextrose 5%. 1000 milliLiter(s) (50 mL/Hr) IV Continuous <Continuous>  dextrose 50% Injectable 12.5 Gram(s) IV Push once  dextrose 50% Injectable 25 Gram(s) IV Push once  glucagon  Injectable 1 milliGRAM(s) IntraMuscular once  insulin glargine Injectable (LANTUS) 15 Unit(s) SubCutaneous at bedtime  insulin lispro (ADMELOG) corrective regimen sliding scale   SubCutaneous every 6 hours  metoprolol tartrate 25 milliGRAM(s) Oral two times a day  montelukast 10 milliGRAM(s) Oral daily  nystatin    Suspension 805099 Unit(s) Oral three times a day    MEDICATIONS  (PRN):  acetaminophen   Oral Liquid .. 650 milliGRAM(s) Oral every 6 hours PRN Temp greater or equal to 38.5C (101.3F)  dextrose Oral Gel 15 Gram(s) Oral once PRN Blood Glucose LESS THAN 70 milliGRAM(s)/deciliter      Allergies    No Known Allergies    Intolerances        REVIEW OF SYSTEMS:  CONSTITUTIONAL: No fever  HEENT:  No headache  RESPIRATORY: No cough or shortness of breath  CARDIOVASCULAR: No chest pain  GASTROINTESTINAL: No abd pain  NEUROLOGICAL: no focal weakness or dizziness  MUSCULOSKELETAL: no myalgias     Vital Signs Last 24 Hrs  T(C): 36.6 (16 May 2024 04:42), Max: 36.9 (15 May 2024 13:56)  T(F): 97.8 (16 May 2024 04:42), Max: 98.4 (15 May 2024 13:56)  HR: 90 (16 May 2024 09:07) (78 - 99)  BP: 137/89 (16 May 2024 04:42) (110/60 - 137/89)  BP(mean): --  RR: 17 (16 May 2024 04:42) (14 - 19)  SpO2: 96% (16 May 2024 09:07) (95% - 100%)    Parameters below as of 16 May 2024 09:07  Patient On (Oxygen Delivery Method): nasal cannula, 3LPM        PHYSICAL EXAM:  GENERAL: NAD, laying in bed  HEENT:  Thrush improved. Dry mucous membranes  CHEST/LUNG:  +coarse breath sounds b/l. No accessory muscle use  HEART:  +irregular rhythm. S1, S2  ABDOMEN:  BS+, soft, NT/ND  EXTREMITIES: Trace edema b/l  NERVOUS SYSTEM: +dysarthria. +R sided hemiparesis. Follows commands.       LABS:                        11.4   9.21  )-----------( 314      ( 16 May 2024 07:41 )             37.5     CBC Full  -  ( 16 May 2024 07:41 )  WBC Count : 9.21 K/uL  Hemoglobin : 11.4 g/dL  Hematocrit : 37.5 %  Platelet Count - Automated : 314 K/uL  Mean Cell Volume : 88.9 fl  Mean Cell Hemoglobin : 27.0 pg  Mean Cell Hemoglobin Concentration : 30.4 gm/dL  Auto Neutrophil # : x  Auto Lymphocyte # : x  Auto Monocyte # : x  Auto Eosinophil # : x  Auto Basophil # : x  Auto Neutrophil % : x  Auto Lymphocyte % : x  Auto Monocyte % : x  Auto Eosinophil % : x  Auto Basophil % : x    16 May 2024 07:41    144    |  110    |  15     ----------------------------<  169    3.6     |  32     |  1.30     Ca    9.5        16 May 2024 07:41  Phos  2.1       16 May 2024 07:41  Mg     1.7       16 May 2024 07:41        Urinalysis Basic - ( 16 May 2024 07:41 )    Color: x / Appearance: x / SG: x / pH: x  Gluc: 169 mg/dL / Ketone: x  / Bili: x / Urobili: x   Blood: x / Protein: x / Nitrite: x   Leuk Esterase: x / RBC: x / WBC x   Sq Epi: x / Non Sq Epi: x / Bacteria: x      CAPILLARY BLOOD GLUCOSE      POCT Blood Glucose.: 162 mg/dL (16 May 2024 04:53)  POCT Blood Glucose.: 142 mg/dL (15 May 2024 23:17)  POCT Blood Glucose.: 148 mg/dL (15 May 2024 17:04)  POCT Blood Glucose.: 181 mg/dL (15 May 2024 11:57)      Personally reviewed.     Consultant(s) Notes Reviewed:  [x] YES  [ ] NO     Patient is a 84y old  Male who presents with a chief complaint of AFib w/ RVR (15 May 2024 18:24)      INTERVAL HPI/OVERNIGHT EVENTS: Patient seen and examined at bedside. No overnight events occurred. Pt s/p PEG placement yesterday, plan to start tube feeds pending nutrition recs. Pt seems more awake and alert, able to verbalize words rather than moan.     MEDICATIONS  (STANDING):  albuterol/ipratropium for Nebulization 3 milliLiter(s) Nebulizer every 6 hours  aMIOdarone    Tablet 400  Oral   aMIOdarone    Tablet 200 milliGRAM(s) Oral daily  aspirin  chewable 81 milliGRAM(s) Enteral Tube daily  atorvastatin 80 milliGRAM(s) Oral at bedtime  buDESOnide    Inhalation Suspension 0.5 milliGRAM(s) Inhalation two times a day  ciprofloxacin     Tablet 500 milliGRAM(s) Oral every 12 hours  dextrose 10% Bolus 125 milliLiter(s) IV Bolus once  dextrose 5% + sodium chloride 0.9%. 1000 milliLiter(s) (75 mL/Hr) IV Continuous <Continuous>  dextrose 5%. 1000 milliLiter(s) (100 mL/Hr) IV Continuous <Continuous>  dextrose 5%. 1000 milliLiter(s) (50 mL/Hr) IV Continuous <Continuous>  dextrose 50% Injectable 12.5 Gram(s) IV Push once  dextrose 50% Injectable 25 Gram(s) IV Push once  glucagon  Injectable 1 milliGRAM(s) IntraMuscular once  insulin glargine Injectable (LANTUS) 15 Unit(s) SubCutaneous at bedtime  insulin lispro (ADMELOG) corrective regimen sliding scale   SubCutaneous every 6 hours  metoprolol tartrate 25 milliGRAM(s) Oral two times a day  montelukast 10 milliGRAM(s) Oral daily  nystatin    Suspension 794340 Unit(s) Oral three times a day    MEDICATIONS  (PRN):  acetaminophen   Oral Liquid .. 650 milliGRAM(s) Oral every 6 hours PRN Temp greater or equal to 38.5C (101.3F)  dextrose Oral Gel 15 Gram(s) Oral once PRN Blood Glucose LESS THAN 70 milliGRAM(s)/deciliter      Allergies    No Known Allergies    Intolerances        REVIEW OF SYSTEMS:  CONSTITUTIONAL: No fever  HEENT:  No headache  RESPIRATORY: No cough or shortness of breath  CARDIOVASCULAR: No chest pain  GASTROINTESTINAL: No abd pain  NEUROLOGICAL: no focal weakness or dizziness  MUSCULOSKELETAL: no myalgias     Vital Signs Last 24 Hrs  T(C): 36.6 (16 May 2024 04:42), Max: 36.9 (15 May 2024 13:56)  T(F): 97.8 (16 May 2024 04:42), Max: 98.4 (15 May 2024 13:56)  HR: 90 (16 May 2024 09:07) (78 - 99)  BP: 137/89 (16 May 2024 04:42) (110/60 - 137/89)  BP(mean): --  RR: 17 (16 May 2024 04:42) (14 - 19)  SpO2: 96% (16 May 2024 09:07) (95% - 100%)    Parameters below as of 16 May 2024 09:07  Patient On (Oxygen Delivery Method): nasal cannula, 3LPM        PHYSICAL EXAM:  GENERAL: NAD, laying in bed  HEENT:  +Dried blood on NC. Thrush improved. Dry mucous membranes  CHEST/LUNG:  +coarse breath sounds b/l. No accessory muscle use  HEART:  +irregular rhythm. S1, S2  ABDOMEN:  BS+, soft, NT/ND  EXTREMITIES: Trace edema b/l  NERVOUS SYSTEM: +dysarthria. +R sided hemiparesis. Follows commands.       LABS:                        11.4   9.21  )-----------( 314      ( 16 May 2024 07:41 )             37.5     CBC Full  -  ( 16 May 2024 07:41 )  WBC Count : 9.21 K/uL  Hemoglobin : 11.4 g/dL  Hematocrit : 37.5 %  Platelet Count - Automated : 314 K/uL  Mean Cell Volume : 88.9 fl  Mean Cell Hemoglobin : 27.0 pg  Mean Cell Hemoglobin Concentration : 30.4 gm/dL  Auto Neutrophil # : x  Auto Lymphocyte # : x  Auto Monocyte # : x  Auto Eosinophil # : x  Auto Basophil # : x  Auto Neutrophil % : x  Auto Lymphocyte % : x  Auto Monocyte % : x  Auto Eosinophil % : x  Auto Basophil % : x    16 May 2024 07:41    144    |  110    |  15     ----------------------------<  169    3.6     |  32     |  1.30     Ca    9.5        16 May 2024 07:41  Phos  2.1       16 May 2024 07:41  Mg     1.7       16 May 2024 07:41        Urinalysis Basic - ( 16 May 2024 07:41 )    Color: x / Appearance: x / SG: x / pH: x  Gluc: 169 mg/dL / Ketone: x  / Bili: x / Urobili: x   Blood: x / Protein: x / Nitrite: x   Leuk Esterase: x / RBC: x / WBC x   Sq Epi: x / Non Sq Epi: x / Bacteria: x      CAPILLARY BLOOD GLUCOSE      POCT Blood Glucose.: 162 mg/dL (16 May 2024 04:53)  POCT Blood Glucose.: 142 mg/dL (15 May 2024 23:17)  POCT Blood Glucose.: 148 mg/dL (15 May 2024 17:04)  POCT Blood Glucose.: 181 mg/dL (15 May 2024 11:57)      Personally reviewed.     Consultant(s) Notes Reviewed:  [x] YES  [ ] NO     Patient is a 84y old  Male who presents with a chief complaint of AFib w/ RVR (15 May 2024 18:24)      INTERVAL HPI/OVERNIGHT EVENTS: Patient seen and examined at bedside. No overnight events occurred. Pt s/p PEG placement yesterday, plan to start tube feeds pending nutrition recs. Pt seems more awake and alert, able to verbalize words rather than moan.     MEDICATIONS  (STANDING):  albuterol/ipratropium for Nebulization 3 milliLiter(s) Nebulizer every 6 hours  aMIOdarone    Tablet 400  Oral   aMIOdarone    Tablet 200 milliGRAM(s) Oral daily  aspirin  chewable 81 milliGRAM(s) Enteral Tube daily  atorvastatin 80 milliGRAM(s) Oral at bedtime  buDESOnide    Inhalation Suspension 0.5 milliGRAM(s) Inhalation two times a day  ciprofloxacin     Tablet 500 milliGRAM(s) Oral every 12 hours  dextrose 10% Bolus 125 milliLiter(s) IV Bolus once  dextrose 5% + sodium chloride 0.9%. 1000 milliLiter(s) (75 mL/Hr) IV Continuous <Continuous>  dextrose 5%. 1000 milliLiter(s) (100 mL/Hr) IV Continuous <Continuous>  dextrose 5%. 1000 milliLiter(s) (50 mL/Hr) IV Continuous <Continuous>  dextrose 50% Injectable 12.5 Gram(s) IV Push once  dextrose 50% Injectable 25 Gram(s) IV Push once  glucagon  Injectable 1 milliGRAM(s) IntraMuscular once  insulin glargine Injectable (LANTUS) 15 Unit(s) SubCutaneous at bedtime  insulin lispro (ADMELOG) corrective regimen sliding scale   SubCutaneous every 6 hours  metoprolol tartrate 25 milliGRAM(s) Oral two times a day  montelukast 10 milliGRAM(s) Oral daily  nystatin    Suspension 908272 Unit(s) Oral three times a day    MEDICATIONS  (PRN):  acetaminophen   Oral Liquid .. 650 milliGRAM(s) Oral every 6 hours PRN Temp greater or equal to 38.5C (101.3F)  dextrose Oral Gel 15 Gram(s) Oral once PRN Blood Glucose LESS THAN 70 milliGRAM(s)/deciliter      Allergies    No Known Allergies    Intolerances        REVIEW OF SYSTEMS:  CONSTITUTIONAL: No fever  HEENT:  No headache  RESPIRATORY: No cough or shortness of breath  CARDIOVASCULAR: No chest pain  GASTROINTESTINAL: No abd pain  NEUROLOGICAL: no focal weakness or dizziness  MUSCULOSKELETAL: no myalgias     Vital Signs Last 24 Hrs  T(C): 36.6 (16 May 2024 04:42), Max: 36.9 (15 May 2024 13:56)  T(F): 97.8 (16 May 2024 04:42), Max: 98.4 (15 May 2024 13:56)  HR: 90 (16 May 2024 09:07) (78 - 99)  BP: 137/89 (16 May 2024 04:42) (110/60 - 137/89)  BP(mean): --  RR: 17 (16 May 2024 04:42) (14 - 19)  SpO2: 96% (16 May 2024 09:07) (95% - 100%)    Parameters below as of 16 May 2024 09:07  Patient On (Oxygen Delivery Method): nasal cannula, 3LPM        PHYSICAL EXAM:  GENERAL: NAD, laying in bed  HEENT:  +Dried blood on NC. Thrush improved. Dry mucous membranes  CHEST/LUNG:  +coarse breath sounds b/l. No accessory muscle use  HEART:  +irregular rhythm. S1, S2  ABDOMEN:  +abdominal dressing intact. BS+, soft, NT/ND  EXTREMITIES: Trace edema b/l  NERVOUS SYSTEM: +dysarthria. +R sided hemiparesis. Follows commands.       LABS:                        11.4   9.21  )-----------( 314      ( 16 May 2024 07:41 )             37.5     CBC Full  -  ( 16 May 2024 07:41 )  WBC Count : 9.21 K/uL  Hemoglobin : 11.4 g/dL  Hematocrit : 37.5 %  Platelet Count - Automated : 314 K/uL  Mean Cell Volume : 88.9 fl  Mean Cell Hemoglobin : 27.0 pg  Mean Cell Hemoglobin Concentration : 30.4 gm/dL  Auto Neutrophil # : x  Auto Lymphocyte # : x  Auto Monocyte # : x  Auto Eosinophil # : x  Auto Basophil # : x  Auto Neutrophil % : x  Auto Lymphocyte % : x  Auto Monocyte % : x  Auto Eosinophil % : x  Auto Basophil % : x    16 May 2024 07:41    144    |  110    |  15     ----------------------------<  169    3.6     |  32     |  1.30     Ca    9.5        16 May 2024 07:41  Phos  2.1       16 May 2024 07:41  Mg     1.7       16 May 2024 07:41        Urinalysis Basic - ( 16 May 2024 07:41 )    Color: x / Appearance: x / SG: x / pH: x  Gluc: 169 mg/dL / Ketone: x  / Bili: x / Urobili: x   Blood: x / Protein: x / Nitrite: x   Leuk Esterase: x / RBC: x / WBC x   Sq Epi: x / Non Sq Epi: x / Bacteria: x      CAPILLARY BLOOD GLUCOSE      POCT Blood Glucose.: 162 mg/dL (16 May 2024 04:53)  POCT Blood Glucose.: 142 mg/dL (15 May 2024 23:17)  POCT Blood Glucose.: 148 mg/dL (15 May 2024 17:04)  POCT Blood Glucose.: 181 mg/dL (15 May 2024 11:57)      Personally reviewed.     Consultant(s) Notes Reviewed:  [x] YES  [ ] NO     Patient is a 84y old  Male who presents with a chief complaint of AFib w/ RVR (15 May 2024 18:24)      INTERVAL HPI/OVERNIGHT EVENTS: Patient seen and examined at bedside. No overnight events occurred. Pt s/p PEG placement yesterday, plan to start tube feeds pending nutrition recs. Pt seems more awake and alert, able to verbalize words rather than moan.     MEDICATIONS  (STANDING):  albuterol/ipratropium for Nebulization 3 milliLiter(s) Nebulizer every 6 hours  aMIOdarone    Tablet 400  Oral   aMIOdarone    Tablet 200 milliGRAM(s) Oral daily  aspirin  chewable 81 milliGRAM(s) Enteral Tube daily  atorvastatin 80 milliGRAM(s) Oral at bedtime  buDESOnide    Inhalation Suspension 0.5 milliGRAM(s) Inhalation two times a day  ciprofloxacin     Tablet 500 milliGRAM(s) Oral every 12 hours  dextrose 10% Bolus 125 milliLiter(s) IV Bolus once  dextrose 5% + sodium chloride 0.9%. 1000 milliLiter(s) (75 mL/Hr) IV Continuous <Continuous>  dextrose 5%. 1000 milliLiter(s) (100 mL/Hr) IV Continuous <Continuous>  dextrose 5%. 1000 milliLiter(s) (50 mL/Hr) IV Continuous <Continuous>  dextrose 50% Injectable 12.5 Gram(s) IV Push once  dextrose 50% Injectable 25 Gram(s) IV Push once  glucagon  Injectable 1 milliGRAM(s) IntraMuscular once  insulin glargine Injectable (LANTUS) 15 Unit(s) SubCutaneous at bedtime  insulin lispro (ADMELOG) corrective regimen sliding scale   SubCutaneous every 6 hours  metoprolol tartrate 25 milliGRAM(s) Oral two times a day  montelukast 10 milliGRAM(s) Oral daily  nystatin    Suspension 281651 Unit(s) Oral three times a day    MEDICATIONS  (PRN):  acetaminophen   Oral Liquid .. 650 milliGRAM(s) Oral every 6 hours PRN Temp greater or equal to 38.5C (101.3F)  dextrose Oral Gel 15 Gram(s) Oral once PRN Blood Glucose LESS THAN 70 milliGRAM(s)/deciliter      Allergies    No Known Allergies    Intolerances        REVIEW OF SYSTEMS:  CONSTITUTIONAL: No fever  HEENT:  No headache  RESPIRATORY: No cough or shortness of breath  CARDIOVASCULAR: No chest pain  GASTROINTESTINAL: No abd pain  NEUROLOGICAL: no focal weakness or dizziness  MUSCULOSKELETAL: no myalgias     Vital Signs Last 24 Hrs  T(C): 36.6 (16 May 2024 04:42), Max: 36.9 (15 May 2024 13:56)  T(F): 97.8 (16 May 2024 04:42), Max: 98.4 (15 May 2024 13:56)  HR: 90 (16 May 2024 09:07) (78 - 99)  BP: 137/89 (16 May 2024 04:42) (110/60 - 137/89)  BP(mean): --  RR: 17 (16 May 2024 04:42) (14 - 19)  SpO2: 96% (16 May 2024 09:07) (95% - 100%)    Parameters below as of 16 May 2024 09:07  Patient On (Oxygen Delivery Method): nasal cannula, 3LPM        PHYSICAL EXAM:  GENERAL: NAD, laying in bed  HEENT:  +Dried blood on NC. Thrush improved. Dry mucous membranes  CHEST/LUNG:  +coarse breath sounds b/l. No accessory muscle use  HEART:  +irregular rhythm. S1, S2  ABDOMEN:  +abdominal dressing intact. BS+, soft, NT/ND  EXTREMITIES: Trace edema b/l  NERVOUS SYSTEM: +dysarthria. +R sided hemiparesis. Follows commands.   skin no wound   gu ext cath     LABS:                        11.4   9.21  )-----------( 314      ( 16 May 2024 07:41 )             37.5     CBC Full  -  ( 16 May 2024 07:41 )  WBC Count : 9.21 K/uL  Hemoglobin : 11.4 g/dL  Hematocrit : 37.5 %  Platelet Count - Automated : 314 K/uL  Mean Cell Volume : 88.9 fl  Mean Cell Hemoglobin : 27.0 pg  Mean Cell Hemoglobin Concentration : 30.4 gm/dL  Auto Neutrophil # : x  Auto Lymphocyte # : x  Auto Monocyte # : x  Auto Eosinophil # : x  Auto Basophil # : x  Auto Neutrophil % : x  Auto Lymphocyte % : x  Auto Monocyte % : x  Auto Eosinophil % : x  Auto Basophil % : x    16 May 2024 07:41    144    |  110    |  15     ----------------------------<  169    3.6     |  32     |  1.30     Ca    9.5        16 May 2024 07:41  Phos  2.1       16 May 2024 07:41  Mg     1.7       16 May 2024 07:41        Urinalysis Basic - ( 16 May 2024 07:41 )    Color: x / Appearance: x / SG: x / pH: x  Gluc: 169 mg/dL / Ketone: x  / Bili: x / Urobili: x   Blood: x / Protein: x / Nitrite: x   Leuk Esterase: x / RBC: x / WBC x   Sq Epi: x / Non Sq Epi: x / Bacteria: x      CAPILLARY BLOOD GLUCOSE      POCT Blood Glucose.: 162 mg/dL (16 May 2024 04:53)  POCT Blood Glucose.: 142 mg/dL (15 May 2024 23:17)  POCT Blood Glucose.: 148 mg/dL (15 May 2024 17:04)  POCT Blood Glucose.: 181 mg/dL (15 May 2024 11:57)      Personally reviewed.     Consultant(s) Notes Reviewed:  [x] YES  [ ] NO

## 2024-05-16 NOTE — PROGRESS NOTE ADULT - ASSESSMENT
dysphagia  h/o CVA    s/p egd/peg  ok to use peg for meds, feeds, water  ok to have pureed diet PO as well, aspiration precautions  will follow    I reviewed the overnight course of events on the unit, re-confirming the patient history. I discussed the care with the patient  Differential diagnosis and plan of care discussed with patient after the evaluation  35 minutes spent on total encounter of which more than fifty percent of the encounter was spent counseling and/or coordinating care by the attending physician.

## 2024-05-16 NOTE — PROGRESS NOTE ADULT - PROBLEM SELECTOR PLAN 10
- Lovenox 90 mg, last dose yesterday PM  - Resume eliquis, S/p PEG placement yesterday    #GOC:  - DNR/DNI, s/p PEG    #Dispo  - SW following, accepted to [facility] (Friday)

## 2024-05-16 NOTE — PROGRESS NOTE ADULT - PROBLEM SELECTOR PLAN 2
Chronic, on home Metoprolol BID and Eliquis   - TTE 4/26/2024: LV no well visualized however LV probably normal based on limited views, moderate thickening of aortic valve leaflets, trace TR, dilated IVC with normal inspiratory collapse, normal pulmonary artery pressure  - S/p PEG placement yesterday, resume eliquis   - c/w amiodarone and lopressor 25mg BID  - Cardiology ([facility]) following, recs appreciated

## 2024-05-16 NOTE — PROGRESS NOTE ADULT - PROBLEM SELECTOR PLAN 1
MRI Head: Posterior left MCA vascular ischemic stroke. No hemorrhagic conversion, repeat CTs w/ no acute change  - Most likely cardio-embolic  - C/w ASA and lipitor qd  - S/p PEG placement yesterday  - Resume eliquis   - F/u nutrition recs for tube feeds, will resume pureed diet as pts mouth appears dry  - c/w neuro checks and fall/aspiration precautions   - Physiatry following, recs appreciated  - Neuro (Dr. Underwood) following,

## 2024-05-16 NOTE — PROGRESS NOTE ADULT - SUBJECTIVE AND OBJECTIVE BOX
[facility] -- Génesis Villavicencio, Carroll Haney Savella, , Carolina Ordaz  Office # [phone]    Follow Up: Afib with RVR, Hx CAD s/p CABG, Cardiac Optimization      Subjective/Observations: Seen awake but non-verbal and unable ot follow commands.  NC at 3L/min.  Not in distress, though, noted to have moist cough and unable to expectorate.      REVIEW OF SYSTEMS: All other review of systems is negative unless indicated above  PAST MEDICAL & SURGICAL HISTORY:  Diabetes Mellitus, Type II      CAD (Coronary Artery Disease)  s/p 3v CABG 2004; stents placed in [place] in 2019      Dyslipidemia      Osteomyelitis      COPD (chronic obstructive pulmonary disease)  on 2L at home and BiPAP at night; intubated 6/18      Hypertension      PVD (peripheral vascular disease)      History of PAT (paroxysmal atrial tachycardia)      Asthma with COPD      BPH (benign prostatic hyperplasia)      Acute osteomyelitis      CABG (Coronary Artery Bypass Graft)  2004      Compound fracture  left leg      S/P primary angioplasty with coronary stent      H/O drainage of abscess  Left femur 12/2021        MEDICATIONS  (STANDING):  albuterol/ipratropium for Nebulization 3 milliLiter(s) Nebulizer every 6 hours  aMIOdarone    Tablet 200 milliGRAM(s) Oral daily  aMIOdarone    Tablet 400  Oral   aspirin  chewable 81 milliGRAM(s) Enteral Tube daily  atorvastatin 80 milliGRAM(s) Oral at bedtime  buDESOnide    Inhalation Suspension 0.5 milliGRAM(s) Inhalation two times a day  ciprofloxacin     Tablet 500 milliGRAM(s) Oral every 12 hours  dextrose 10% Bolus 125 milliLiter(s) IV Bolus once  dextrose 5%. 1000 milliLiter(s) (50 mL/Hr) IV Continuous <Continuous>  dextrose 5%. 1000 milliLiter(s) (100 mL/Hr) IV Continuous <Continuous>  dextrose 50% Injectable 25 Gram(s) IV Push once  dextrose 50% Injectable 12.5 Gram(s) IV Push once  glucagon  Injectable 1 milliGRAM(s) IntraMuscular once  insulin glargine Injectable (LANTUS) 15 Unit(s) SubCutaneous at bedtime  insulin lispro (ADMELOG) corrective regimen sliding scale   SubCutaneous every 6 hours  metoprolol tartrate 25 milliGRAM(s) Oral two times a day  montelukast 10 milliGRAM(s) Oral daily  nystatin    Suspension 959750 Unit(s) Oral three times a day    MEDICATIONS  (PRN):  acetaminophen   Oral Liquid .. 650 milliGRAM(s) Oral every 6 hours PRN Temp greater or equal to 38.5C (101.3F)  dextrose Oral Gel 15 Gram(s) Oral once PRN Blood Glucose LESS THAN 70 milliGRAM(s)/deciliter    Allergies    No Known Allergies    Intolerances      Vital Signs Last 24 Hrs  T(C): 36.8 (16 May 2024 12:26), Max: 36.9 (15 May 2024 13:56)  T(F): 98.2 (16 May 2024 12:26), Max: 98.4 (15 May 2024 13:56)  HR: 96 (16 May 2024 12:26) (78 - 99)  BP: 112/69 (16 May 2024 12:26) (112/69 - 137/89)  BP(mean): --  RR: 18 (16 May 2024 12:26) (14 - 18)  SpO2: 96% (16 May 2024 12:26) (96% - 100%)    Parameters below as of 16 May 2024 09:07  Patient On (Oxygen Delivery Method): nasal cannula, 3LPM    I&O's Summary      PHYSICAL EXAM:  TELE: Not on tele  Constitutional: NAD, awake and alert  HEENT: Moist Mucous Membranes, Anicteric  Pulmonary: Non-labored, breath sounds are diminished bilaterally, mild wheezing, no rales or rhonchi  Cardiovascular: RRR, S1 and S2, No murmurs, rubs, gallops or clicks  Gastrointestinal: Bowel Sounds present, soft, nontender.   Lymph: No peripheral edema. No lymphadenopathy.  Skin: No visible rashes or ulcers.  Psych:  Mood & affect: Non-verbal  LABS: All Labs Reviewed:                        11.4   9.21  )-----------( 314      ( 16 May 2024 07:41 )             37.5                         11.9   6.18  )-----------( 330      ( 15 May 2024 06:57 )             38.8                         11.6   6.09  )-----------( 318      ( 14 May 2024 08:03 )             39.4     16 May 2024 07:41    144    |  110    |  15     ----------------------------<  169    3.6     |  32     |  1.30   15 May 2024 06:57    145    |  108    |  21     ----------------------------<  191    3.5     |  34     |  1.60   14 May 2024 08:03    146    |  111    |  21     ----------------------------<  202    3.5     |  32     |  1.50     Ca    9.5        16 May 2024 07:41  Ca    9.4        15 May 2024 06:57  Ca    9.5        14 May 2024 08:03  Phos  2.1       16 May 2024 07:41  Phos  2.6       15 May 2024 06:57  Phos  2.6       14 May 2024 08:03  Mg     1.7       16 May 2024 07:41  Mg     2.1       15 May 2024 06:57  Mg     2.0       14 May 2024 08:03    12 Lead ECG:   Ventricular Rate 95 BPM    Atrial Rate 95 BPM    P-R Interval 136 ms    QRS Duration 86 ms    Q-T Interval 352 ms    QTC Calculation(Bazett) 442 ms    P Axis 84 degrees    R Axis 88 degrees    T Axis 58 degrees    Diagnosis Line Normal sinus rhythm  Low voltage QRS  possible BREANNA  Borderline ECG    Confirmed by Cookie Ordaz ([id number]) on 5/2/2024 2:38:25 PM (05-02-24 @ 09:49)      TRANSTHORACIC ECHOCARDIOGRAM REPORT  ________________________________________________________________________________                                      _______       Pt. Name:       YUE COTTO Study Date:    4/26/2024  MRN:            [id number]            YOB: 1940  Accession #:    [id number]           Age:           83 years  Account#:       [id number]          Gender:        M  Visit ID#  Heart Rate:                         Height:        70.08 in (178.00 cm)  Rhythm:                            Weight:        220.46 lb (100.00 kg)  Blood Pressure: 158/69 mmHg         BSA/BMI:       2.18 m² / 31.56 kg/m²  ________________________________________________________________________________________  Referring Physician:   [id number] Marc Grossman  Interpreting Physician: Cookie Ordaz MD  Primary Sonographer:    Clayton Wilkinson    CPT:               ECHO TTE WO CON COMP W DOPP - 87665.m  Indication(s):     Unspecified atrial fibrillation - I48.91  Procedure:         Transthoracic echocardiogram with 2-D, M-mode and complete                     spectral and color flow Doppler.  Ordering Location: [facility]  Admission Status:  Inpatient  Study Information: Image quality for this study is technically difficult.    _______________________________________________________________________________________     CONCLUSIONS:      1. Technically difficult image quality.   2. The LV is not well visualized, however the LV function is probably normal based on limited views.   3. The right ventricle is not well visualized. probably normal systolic function.   4. There is moderate thickening of the aortic valve leaflets.   5. Structurally normal mitral valve with normal leaflet excursion.   6. Trace tricuspid regurgitation.   7. The inferior vena cava is dilated measuring 2.18 cm in diameter, (dilated >2.1cm) with normal inspiratory collapse (normal >50%) consistent with mildly elevated right atrial pressure (~8, range 5-10mmHg).   8. Estimated pulmonary artery systolic pressure is 26 mmHg, consistent with normal pulmonary artery pressure.    ________________________________________________________________________________________  FINDINGS:     Left Ventricle:  There is normal left ventricular diastolic function. Left ventricular endocardium is not well visualized.     Right Ventricle:  The right ventricle is not well visualized. Probably normal systolic function.     Left Atrium:  The left atrium is normal in size with an indexed volume of 16.30 ml/m².     Right Atrium:  The right atrium is normal in size.     Aortic Valve:  The aortic valve anatomy cannot be determined with normal systolic excursion. There is mild calcification of the aortic valve leaflets. There is moderate thickening of the aortic valve leaflets.     Mitral Valve:  Structurally normal mitral valve with normal leaflet excursion. There is trace mitral regurgitation.     Tricuspid Valve:  The tricuspid valve was not well visualized. There is trace tricuspid regurgitation. Estimated pulmonary artery systolic pressure is 26 mmHg, consistent with normal pulmonary artery pressure.     Pulmonic Valve:  The pulmonic valve was not well visualized.     Aorta:  The aortic root at the sinuses of Valsalva is normal in size, measuring 3.30 cm (indexed 1.52 cm/m²).     Systemic Veins:  The inferior vena cava is dilated measuring 2.18 cm in diameter, (dilated >2.1cm) with normal inspiratory collapse (normal >50%) consistent with mildly elevated right atrial pressure (~8, range 5-10mmHg).  ____________________________________________________________________  QUANTITATIVE DATA:  Left Ventricle Measurements: (Indexed to BSA)     IVSd (2D):   1.1 cm  LVPWd (2D):  0.9 cm  LVIDd (2D):  4.0 cm  LVIDs (2D):  2.8 cm  LV Mass:     129 g  59.1 g/m²     MVE Vmax:    0.59 m/s  MV A Vmax:    0.85 m/s  MV E/A:       0.69  e' lateral:   6.96 cm/s  e' medial:    5.98 cm/s  E/e' lateral: 8.43  E/e' medial:  9.82  E/e' Average: 9.07  MV DT:        195 msec    Aorta Measurements: (Normal range) (Indexed to BSA)     Sinuses of Valsalva: 3.30 cm (3.1 - 3.7 cm)       Left Atrium Measurements: (Indexed to BSA)  LA Diam 2D: 3.30 cm       LVOT / RVOT/ Qp/Qs Data: (Indexed to BSA)  LVOT Diameter: 1.80 cm  LVOT Area:     2.54 cm²    Mitral Valve Measurements:     MV E Vmax: 0.6 m/s  MV A Vmax: 0.8 m/s  MV E/A:    0.7       Tricuspid Valve Measurements:     TR Vmax:          2.1 m/s  TR Peak Gradient: 18.1 mmHg  RA Pressure:      8 mmHg  PASP:             26 mmHg    ________________________________________________________________________________________  Electronically signed on 4/26/2024 at 4:46:45 PM by Cookie Ordaz MD         *** Final ***      [facility] -- Génesis Villavicencio, Carroll Haney Savella, , Carolina Ordaz  Office # [phone]    Follow Up: Afib with RVR, Hx CAD s/p CABG, Cardiac Optimization      Subjective/Observations: Seen awake but non-verbal and unable ot follow commands.  NC at 3L/min.  Not in distress, though, noted to have moist cough and unable to expectorate.      REVIEW OF SYSTEMS: All other review of systems is negative unless indicated above  PAST MEDICAL & SURGICAL HISTORY:  Diabetes Mellitus, Type II      CAD (Coronary Artery Disease)  s/p 3v CABG 2004; stents placed in [place] in 2019      Dyslipidemia      Osteomyelitis      COPD (chronic obstructive pulmonary disease)  on 2L at home and BiPAP at night; intubated 6/18      Hypertension      PVD (peripheral vascular disease)      History of PAT (paroxysmal atrial tachycardia)      Asthma with COPD      BPH (benign prostatic hyperplasia)      Acute osteomyelitis      CABG (Coronary Artery Bypass Graft)  2004      Compound fracture  left leg      S/P primary angioplasty with coronary stent      H/O drainage of abscess  Left femur 12/2021        MEDICATIONS  (STANDING):  albuterol/ipratropium for Nebulization 3 milliLiter(s) Nebulizer every 6 hours  aMIOdarone    Tablet 200 milliGRAM(s) Oral daily  aMIOdarone    Tablet 400  Oral   aspirin  chewable 81 milliGRAM(s) Enteral Tube daily  atorvastatin 80 milliGRAM(s) Oral at bedtime  buDESOnide    Inhalation Suspension 0.5 milliGRAM(s) Inhalation two times a day  ciprofloxacin     Tablet 500 milliGRAM(s) Oral every 12 hours  dextrose 10% Bolus 125 milliLiter(s) IV Bolus once  dextrose 5%. 1000 milliLiter(s) (50 mL/Hr) IV Continuous <Continuous>  dextrose 5%. 1000 milliLiter(s) (100 mL/Hr) IV Continuous <Continuous>  dextrose 50% Injectable 25 Gram(s) IV Push once  dextrose 50% Injectable 12.5 Gram(s) IV Push once  glucagon  Injectable 1 milliGRAM(s) IntraMuscular once  insulin glargine Injectable (LANTUS) 15 Unit(s) SubCutaneous at bedtime  insulin lispro (ADMELOG) corrective regimen sliding scale   SubCutaneous every 6 hours  metoprolol tartrate 25 milliGRAM(s) Oral two times a day  montelukast 10 milliGRAM(s) Oral daily  nystatin    Suspension 473607 Unit(s) Oral three times a day    MEDICATIONS  (PRN):  acetaminophen   Oral Liquid .. 650 milliGRAM(s) Oral every 6 hours PRN Temp greater or equal to 38.5C (101.3F)  dextrose Oral Gel 15 Gram(s) Oral once PRN Blood Glucose LESS THAN 70 milliGRAM(s)/deciliter    Allergies    No Known Allergies    Intolerances      Vital Signs Last 24 Hrs  T(C): 36.8 (16 May 2024 12:26), Max: 36.9 (15 May 2024 13:56)  T(F): 98.2 (16 May 2024 12:26), Max: 98.4 (15 May 2024 13:56)  HR: 96 (16 May 2024 12:26) (78 - 99)  BP: 112/69 (16 May 2024 12:26) (112/69 - 137/89)  BP(mean): --  RR: 18 (16 May 2024 12:26) (14 - 18)  SpO2: 96% (16 May 2024 12:26) (96% - 100%)    Parameters below as of 16 May 2024 09:07  Patient On (Oxygen Delivery Method): nasal cannula, 3LPM    I&O's Summary      PHYSICAL EXAM:  TELE: Not on tele  Constitutional: NAD, awake and alert  HEENT: Moist Mucous Membranes, Anicteric  Pulmonary: Non-labored, breath sounds are diminished bilaterally, mild wheezing, no rales or rhonchi  Cardiovascular: RRR, S1 and S2, No murmurs, rubs, gallops or clicks  Gastrointestinal: Bowel Sounds present, soft, nontender.   Lymph: No peripheral edema. No lymphadenopathy.  Skin: No visible rashes or ulcers.  Psych:  Mood & affect: Non-verbal  LABS: All Labs Reviewed:                        11.4   9.21  )-----------( 314      ( 16 May 2024 07:41 )             37.5                         11.9   6.18  )-----------( 330      ( 15 May 2024 06:57 )             38.8                         11.6   6.09  )-----------( 318      ( 14 May 2024 08:03 )             39.4     16 May 2024 07:41    144    |  110    |  15     ----------------------------<  169    3.6     |  32     |  1.30   15 May 2024 06:57    145    |  108    |  21     ----------------------------<  191    3.5     |  34     |  1.60   14 May 2024 08:03    146    |  111    |  21     ----------------------------<  202    3.5     |  32     |  1.50     Ca    9.5        16 May 2024 07:41  Ca    9.4        15 May 2024 06:57  Ca    9.5        14 May 2024 08:03  Phos  2.1       16 May 2024 07:41  Phos  2.6       15 May 2024 06:57  Phos  2.6       14 May 2024 08:03  Mg     1.7       16 May 2024 07:41  Mg     2.1       15 May 2024 06:57  Mg     2.0       14 May 2024 08:03    12 Lead ECG:   Ventricular Rate 95 BPM    Atrial Rate 95 BPM    P-R Interval 136 ms    QRS Duration 86 ms    Q-T Interval 352 ms    QTC Calculation(Bazett) 442 ms    P Axis 84 degrees    R Axis 88 degrees    T Axis 58 degrees    Diagnosis Line Normal sinus rhythm  Low voltage QRS  possible BREANNA  Borderline ECG    Confirmed by Cookie Ordaz ([id number]) on 5/2/2024 2:38:25 PM (05-02-24 @ 09:49)      TRANSTHORACIC ECHOCARDIOGRAM REPORT  ________________________________________________________________________________                                      _______       Pt. Name:       YUE COTTO Study Date:    4/26/2024  MRN:            [id number]            YOB: 1940  Accession #:    [id number]           Age:           83 years  Account#:       [id number]          Gender:        M  Visit ID#  Heart Rate:                         Height:        70.08 in (178.00 cm)  Rhythm:                            Weight:        220.46 lb (100.00 kg)  Blood Pressure: 158/69 mmHg         BSA/BMI:       2.18 m² / 31.56 kg/m²  ________________________________________________________________________________________  Referring Physician:   [id number] Marc Grossman  Interpreting Physician: Cookie Ordaz MD  Primary Sonographer:    Clayton Wilkinson    CPT:               ECHO TTE WO CON COMP W DOPP - 66775.m  Indication(s):     Unspecified atrial fibrillation - I48.91  Procedure:         Transthoracic echocardiogram with 2-D, M-mode and complete                     spectral and color flow Doppler.  Ordering Location: [facility]  Admission Status:  Inpatient  Study Information: Image quality for this study is technically difficult.    _______________________________________________________________________________________     CONCLUSIONS:      1. Technically difficult image quality.   2. The LV is not well visualized, however the LV function is probably normal based on limited views.   3. The right ventricle is not well visualized. probably normal systolic function.   4. There is moderate thickening of the aortic valve leaflets.   5. Structurally normal mitral valve with normal leaflet excursion.   6. Trace tricuspid regurgitation.   7. The inferior vena cava is dilated measuring 2.18 cm in diameter, (dilated >2.1cm) with normal inspiratory collapse (normal >50%) consistent with mildly elevated right atrial pressure (~8, range 5-10mmHg).   8. Estimated pulmonary artery systolic pressure is 26 mmHg, consistent with normal pulmonary artery pressure.    ________________________________________________________________________________________  FINDINGS:     Left Ventricle:  There is normal left ventricular diastolic function. Left ventricular endocardium is not well visualized.     Right Ventricle:  The right ventricle is not well visualized. Probably normal systolic function.     Left Atrium:  The left atrium is normal in size with an indexed volume of 16.30 ml/m².     Right Atrium:  The right atrium is normal in size.     Aortic Valve:  The aortic valve anatomy cannot be determined with normal systolic excursion. There is mild calcification of the aortic valve leaflets. There is moderate thickening of the aortic valve leaflets.     Mitral Valve:  Structurally normal mitral valve with normal leaflet excursion. There is trace mitral regurgitation.     Tricuspid Valve:  The tricuspid valve was not well visualized. There is trace tricuspid regurgitation. Estimated pulmonary artery systolic pressure is 26 mmHg, consistent with normal pulmonary artery pressure.     Pulmonic Valve:  The pulmonic valve was not well visualized.     Aorta:  The aortic root at the sinuses of Valsalva is normal in size, measuring 3.30 cm (indexed 1.52 cm/m²).     Systemic Veins:  The inferior vena cava is dilated measuring 2.18 cm in diameter, (dilated >2.1cm) with normal inspiratory collapse (normal >50%) consistent with mildly elevated right atrial pressure (~8, range 5-10mmHg).  ____________________________________________________________________  QUANTITATIVE DATA:  Left Ventricle Measurements: (Indexed to BSA)     IVSd (2D):   1.1 cm  LVPWd (2D):  0.9 cm  LVIDd (2D):  4.0 cm  LVIDs (2D):  2.8 cm  LV Mass:     129 g  59.1 g/m²     MVE Vmax:    0.59 m/s  MV A Vmax:    0.85 m/s  MV E/A:       0.69  e' lateral:   6.96 cm/s  e' medial:    5.98 cm/s  E/e' lateral: 8.43  E/e' medial:  9.82  E/e' Average: 9.07  MV DT:        195 msec    Aorta Measurements: (Normal range) (Indexed to BSA)     Sinuses of Valsalva: 3.30 cm (3.1 - 3.7 cm)       Left Atrium Measurements: (Indexed to BSA)  LA Diam 2D: 3.30 cm       LVOT / RVOT/ Qp/Qs Data: (Indexed to BSA)  LVOT Diameter: 1.80 cm  LVOT Area:     2.54 cm²    Mitral Valve Measurements:     MV E Vmax: 0.6 m/s  MV A Vmax: 0.8 m/s  MV E/A:    0.7       Tricuspid Valve Measurements:     TR Vmax:          2.1 m/s  TR Peak Gradient: 18.1 mmHg  RA Pressure:      8 mmHg  PASP:             26 mmHg    ________________________________________________________________________________________  Electronically signed on 4/26/2024 at 4:46:45 PM by Cookie Ordaz MD         *** Final ***
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
## 2024-05-16 NOTE — CHART NOTE - NSCHARTNOTEFT_GEN_A_CORE
Assessment:   Patient seen for nutrition follow up. chart reviewed/events noted. Patient now s/p PEG 5/15, per discussion with RN, able to be used for feeds (able to take medications PO). Patient without complaints of GI distress. Patient's wife at bedside at time of RD visit. I told her that I would choose Glucerna 1.5 (as it is a carb steady formula) to help optimize glycemic control while providing patient's estimated nutrient needs. Patient's wife verbalizes understanding.      Factors impacting intake: [ ] none [ ] nausea  [ ] vomiting [ ] diarrhea [ ] constipation  [ ]chewing problems [x ] swallowing issues  [ x] other:  persistent poor PO intake, resulting in PEG placement    Diet Presciption: Diet, NPO with Tube Feed:   Tube Feeding Modality: Gastrostomy  Glucerna 1.5  Total Volume for 24 Hours (mL): 1320  Continuous  Starting Tube Feed Rate {mL per Hour}: 10  Increase Tube Feed Rate by (mL): 10     Every 4 hours  Until Goal Tube Feed Rate (mL per Hour): 55  Tube Feed Duration (in Hours): 24  Tube Feed Start Time: 12:00  Free Water Flush  Pump   Rate (mL per Hour): 40   Frequency: Every Hour    Start Time: 12:00 (05-16-24 @ 10:57)    Intake: patient had been with  persistent poor PO intake, also had been NPO for PEG placement    Current Weight: weights appear variable (5/13) 197.5 pounds (5/14) 206.3 pounds (5/15 and 5/16) 223.1 pounds, weights from 5/15 and 5/16 likely inaccurate as prior to that, there had been a consistent trend downward. will continue to monitor  % Weight Change    Pertinent Medications: MEDICATIONS  (STANDING):  albuterol/ipratropium for Nebulization 3 milliLiter(s) Nebulizer every 6 hours  aMIOdarone    Tablet 400  Oral   aMIOdarone    Tablet 200 milliGRAM(s) Oral daily  aspirin  chewable 81 milliGRAM(s) Enteral Tube daily  atorvastatin 80 milliGRAM(s) Oral at bedtime  buDESOnide    Inhalation Suspension 0.5 milliGRAM(s) Inhalation two times a day  ciprofloxacin     Tablet 500 milliGRAM(s) Oral every 12 hours  dextrose 10% Bolus 125 milliLiter(s) IV Bolus once  dextrose 5% + sodium chloride 0.9%. 1000 milliLiter(s) (75 mL/Hr) IV Continuous <Continuous>  dextrose 5%. 1000 milliLiter(s) (50 mL/Hr) IV Continuous <Continuous>  dextrose 5%. 1000 milliLiter(s) (100 mL/Hr) IV Continuous <Continuous>  dextrose 50% Injectable 25 Gram(s) IV Push once  dextrose 50% Injectable 12.5 Gram(s) IV Push once  glucagon  Injectable 1 milliGRAM(s) IntraMuscular once  insulin glargine Injectable (LANTUS) 15 Unit(s) SubCutaneous at bedtime  insulin lispro (ADMELOG) corrective regimen sliding scale   SubCutaneous every 6 hours  metoprolol tartrate 25 milliGRAM(s) Oral two times a day  montelukast 10 milliGRAM(s) Oral daily  nystatin    Suspension 059910 Unit(s) Oral three times a day  potassium phosphate IVPB 30 milliMole(s) IV Intermittent once    MEDICATIONS  (PRN):  acetaminophen   Oral Liquid .. 650 milliGRAM(s) Oral every 6 hours PRN Temp greater or equal to 38.5C (101.3F)  dextrose Oral Gel 15 Gram(s) Oral once PRN Blood Glucose LESS THAN 70 milliGRAM(s)/deciliter    Pertinent Labs: 05-16 Na144 mmol/L Glu 169 mg/dL<H> K+ 3.6 mmol/L Cr  1.30 mg/dL BUN 15 mg/dL 05-16 Phos 2.1 mg/dL<L> 05-13 Alb 2.7 g/dL<L> 04-26 Chol 124 mg/dL LDL --    HDL 48 mg/dL Trig 93 mg/dL     CAPILLARY BLOOD GLUCOSE      POCT Blood Glucose.: 162 mg/dL (16 May 2024 04:53)  POCT Blood Glucose.: 142 mg/dL (15 May 2024 23:17)  POCT Blood Glucose.: 148 mg/dL (15 May 2024 17:04)    Skin: + 2 Right arm/wrist edema, no pressure injuries per documentation    Estimated Needs:   [x ] no change since previous assessment  [ ] recalculated:     Previous Nutrition Diagnosis:   [ ] Inadequate Energy Intake [ ]Inadequate Oral Intake [ ] Excessive Energy Intake   [ ] Underweight [ ] Increased Nutrient Needs [ ] Overweight/Obesity   [ ] Altered GI Function [ ] Unintended Weight Loss [ ] Food & Nutrition Related Knowledge Deficit [x ] Malnutrition , acute severe    Nutrition Diagnosis is [x ] ongoing, to be addressed with provision of nutrition support  [ ] resolved [ ] not applicable     New Nutrition Diagnosis: [ x] not applicable       Interventions:   Recommend  [x ] Change Diet To: would initiate pureed, mildly thick liquids for pleasure feeds, QOL  [ ] Nutrition Supplement  [x ] Nutrition Support: initiate nutrition support as medically feasible/once enteral acccess in place if within GOC. *as patient with prolonged poor PO intake, high risk of refeeding syndrome. Recommend Glucerna 1.5 at 10 cc/hr, increasing by 10 cc q 4-6 hours as tolerated to achieve eventual goal rate of 55 cc/hr x 24 hours to provide 1980 calories, 109 g protein, 1000 cc free water. free water flush 40 cc/hr as tolerated. Msg sent to MD via teams.  [ ] Other:     Monitoring and Evaluation:   [ x] PO intake, if PO diet resumed [ x ] Tolerance to diet prescription [ x ] weights [ x ] labs[ x ] follow up per protocol  [ ] other:

## 2024-05-16 NOTE — PROGRESS NOTE ADULT - PROBLEM [facility]OR PLAN 4
Chronic, CKD3 baseline Cr 1.1  - Hypernatremia improving, will monitor off IVF  - Cr stable, continue to monitor  - Continue to hold home furosemide  - Monitor volume status closely  - Nephrology (Dr. Regan) following,

## 2024-05-16 NOTE — SOCIAL WORK PROGRESS NOTE - NSSWPROGRESSNOTE_GEN_ALL_CORE
Discussed today at rounds and with MD who is planning for discharge tomorrow. Peg feeding tube was placed yesterday. Received auth letter from [place] with reference#[id number] updated auth for [facility] from 5/17- 5/24. Letter sent to [facility] and they can accept tomorrow. Confirmed they can increase peg feed there if he is not at goal rate by tomorrow. Met with wife today and she is aware of plan for possible d/c tomorrow. Social work to follow up tomorrow.  Discussed today at rounds and with MD who is planning for discharge tomorrow. Peg feeding tube was placed yesterday. Received auth letter from [place] with reference#[id number] updated auth for [facility] from 5/17- 5/24. Letter and SCREEN sent to [facility] and they can accept tomorrow. Confirmed they can increase peg feed there if he is not at goal rate by tomorrow. Met with wife today and she is aware of plan for possible d/c tomorrow. Social work to follow up tomorrow.

## 2024-05-16 NOTE — PROGRESS NOTE ADULT - TIME BILLING
pt seen and examine today see above plan -  acute     cva   MRI Head: Posterior left MCA vascular ischemic stroke. No hemorrhagic conversion, repeat   cause - Most likely cardio-embolic   - rt side weakness - C/w ASA and Lipitor qd   , on  Eliquis     for increase  po  caloric  intake    peg placed    started  tube feed   Glucerna   / continue puree diet  . wife bed side aware tt/plan .

## 2024-05-16 NOTE — CASE MANAGEMENT PROGRESS NOTE - NSCMPROGRESSNOTE_GEN_ALL_CORE
Discussed pt in rounds, s/p peg placement, to start on peg tube feedings today. SW following for rehab placement, possible tomorrow per MD.

## 2024-05-17 NOTE — DISCHARGE NOTE NURSING/CASE MANAGEMENT/SOCIAL WORK - PATIENT PORTAL LINK FT
You can access the FollowMyHealth Patient Portal offered by [facility] by registering at the following website: [email or web address]. By joining [facility]’s FollowMyHealth portal, you will also be able to view your health information using other applications (apps) compatible with our system.

## 2024-05-17 NOTE — PROGRESS NOTE ADULT - REASON FOR ADMISSION
AFib w/ RVR

## 2024-05-17 NOTE — SOCIAL WORK PROGRESS NOTE - NSSWPROGRESSNOTE_GEN_ALL_CORE
Per MD, pt is cleared for dc today. Pt to be dc to [facility] today at 3PM via [facility] ambulance. SW made pt and pt spouse Mercedes aware of dc/transport for today and they are both in agreement. SW will remain available as needed.

## 2024-05-17 NOTE — PROGRESS NOTE ADULT - ASSESSMENT
Hypernatremia: decreased free water intake  MERRILL on CKD 3: Prerenal azotemia  Hypotension, on midodrine  Diabetes  Atrial fibrillation  CVA  Hypokalemia    Improved and stable renal indices. s/p PEG. On tube feeds. Monitor blood sugar levels. Insulin coverage as needed. PRN Potassium supplementation.   Monitor BP trend. Encourage PO intake as tolerated. Avoid nephrotoxic meds as possible. Discussed with patients wife at the bedside. D/c planning.

## 2024-05-17 NOTE — PROGRESS NOTE ADULT - SUBJECTIVE AND OBJECTIVE BOX
[facility] -- Génesis Villavicencio, [place], Kumar Hodges, , Carolina Ordaz  Office # [phone]    Follow Up:  Afib with RVR, Hx CAD s/p CABG, Cardiac Optimization     Subjective/Observations: Asleep, difficult to awaken.  NC at 2L/min in use.  Not in any form of distress.      REVIEW OF SYSTEMS: All other review of systems is negative unless indicated above  PAST MEDICAL & SURGICAL HISTORY:  Diabetes Mellitus, Type II      CAD (Coronary Artery Disease)  s/p 3v CABG 2004; stents placed in [place] in 2019      Dyslipidemia      Osteomyelitis      COPD (chronic obstructive pulmonary disease)  on 2L at home and BiPAP at night; intubated 6/18      Hypertension      PVD (peripheral vascular disease)      History of PAT (paroxysmal atrial tachycardia)      Asthma with COPD      BPH (benign prostatic hyperplasia)      Acute osteomyelitis      CABG (Coronary Artery Bypass Graft)  2004      Compound fracture  left leg      S/P primary angioplasty with coronary stent      H/O drainage of abscess  Left femur 12/2021        MEDICATIONS  (STANDING):  albuterol/ipratropium for Nebulization 3 milliLiter(s) Nebulizer every 6 hours  aMIOdarone    Tablet 400  Oral   aMIOdarone    Tablet 200 milliGRAM(s) Oral daily  apixaban 2.5 milliGRAM(s) Oral two times a day  aspirin  chewable 81 milliGRAM(s) Enteral Tube daily  atorvastatin 80 milliGRAM(s) Oral at bedtime  buDESOnide    Inhalation Suspension 0.5 milliGRAM(s) Inhalation two times a day  ciprofloxacin     Tablet 500 milliGRAM(s) Oral every 12 hours  dextrose 10% Bolus 125 milliLiter(s) IV Bolus once  dextrose 5%. 1000 milliLiter(s) (100 mL/Hr) IV Continuous <Continuous>  dextrose 5%. 1000 milliLiter(s) (50 mL/Hr) IV Continuous <Continuous>  dextrose 50% Injectable 12.5 Gram(s) IV Push once  dextrose 50% Injectable 25 Gram(s) IV Push once  glucagon  Injectable 1 milliGRAM(s) IntraMuscular once  insulin glargine Injectable (LANTUS) 15 Unit(s) SubCutaneous at bedtime  insulin lispro (ADMELOG) corrective regimen sliding scale   SubCutaneous every 6 hours  metoprolol tartrate 25 milliGRAM(s) Oral two times a day  montelukast 10 milliGRAM(s) Oral daily  nystatin    Suspension 228534 Unit(s) Oral three times a day    MEDICATIONS  (PRN):  acetaminophen   Oral Liquid .. 650 milliGRAM(s) Oral every 6 hours PRN Temp greater or equal to 38.5C (101.3F)  dextrose Oral Gel 15 Gram(s) Oral once PRN Blood Glucose LESS THAN 70 milliGRAM(s)/deciliter    Allergies    No Known Allergies    Intolerances      Vital Signs Last 24 Hrs  T(C): 36.9 (17 May 2024 04:00), Max: 36.9 (17 May 2024 04:00)  T(F): 98.4 (17 May 2024 04:00), Max: 98.4 (17 May 2024 04:00)  HR: 95 (17 May 2024 08:11) (84 - 100)  BP: 108/75 (17 May 2024 05:59) (98/66 - 155/83)  BP(mean): --  RR: 18 (17 May 2024 05:59) (18 - 20)  SpO2: 96% (17 May 2024 08:11) (91% - 100%)    Parameters below as of 17 May 2024 08:11  Patient On (Oxygen Delivery Method): nasal cannula, 2LPM    I&O's Summary    16 May 2024 07:01  -  17 May 2024 07:00  --------------------------------------------------------  IN: 0 mL / OUT: 500 mL / NET: -500 mL    PHYSICAL EXAM:  TELE: Not on tele  Constitutional: NAD, awake and alert  HEENT: Moist Mucous Membranes, Anicteric  Pulmonary: Non-labored, breath sounds are diminished bilaterally, mild wheezing, no rales or rhonchi  Cardiovascular: RRR, S1 and S2, No murmurs, rubs, gallops or clicks  Gastrointestinal: Bowel Sounds present, soft, nontender.   Lymph: No peripheral edema. No lymphadenopathy.  Skin: No visible rashes or ulcers.  Psych:  Mood & affect: Non-verbal    LABS: All Labs Reviewed:                        11.4   9.21  )-----------( 314      ( 16 May 2024 07:41 )             37.5                         11.9   6.18  )-----------( 330      ( 15 May 2024 06:57 )             38.8     17 May 2024 05:55    144    |  107    |  17     ----------------------------<  185    3.9     |  32     |  1.30   16 May 2024 07:41    144    |  110    |  15     ----------------------------<  169    3.6     |  32     |  1.30   15 May 2024 06:57    145    |  108    |  21     ----------------------------<  191    3.5     |  34     |  1.60     Ca    9.7        17 May 2024 05:55  Ca    9.5        16 May 2024 07:41  Ca    9.4        15 May 2024 06:57  Phos  2.8       17 May 2024 05:55  Phos  2.1       16 May 2024 07:41  Phos  2.6       15 May 2024 06:57  Mg     1.8       17 May 2024 05:55  Mg     1.7       16 May 2024 07:41  Mg     2.1       15 May 2024 06:57            12 Lead ECG:   Ventricular Rate 95 BPM    Atrial Rate 95 BPM    P-R Interval 136 ms    QRS Duration 86 ms    Q-T Interval 352 ms    QTC Calculation(Bazett) 442 ms    P Axis 84 degrees    R Axis 88 degrees    T Axis 58 degrees    Diagnosis Line Normal sinus rhythm  Low voltage QRS  possible BREANNA  Borderline ECG    Confirmed by Cookie Ordaz ([id number]) on 5/2/2024 2:38:25 PM (05-02-24 @ 09:49)      TRANSTHORACIC ECHOCARDIOGRAM REPORT  ________________________________________________________________________________                                      _______       Pt. Name:       YUE LAUREANO  Study Date:    4/26/2024  MRN:            [id number]            YOB: 1940  Accession #:    [id number]           Age:           83 years  Account#:       [id number]          Gender:        M  Visit ID#  Heart Rate:                         Height:        70.08 in (178.00 cm)  Rhythm:                            Weight:        220.46 lb (100.00 kg)  Blood Pressure: 158/69 mmHg         BSA/BMI:       2.18 m² / 31.56 kg/m²  ________________________________________________________________________________________  Referring Physician:   [id number] Marc Grossman  Interpreting Physician: Cookie Ordaz MD  Primary Sonographer:    Clayton Wilkinson    CPT:               ECHO TTE WO CON COMP W DOPP - 45045.m  Indication(s):     Unspecified atrial fibrillation - I48.91  Procedure:         Transthoracic echocardiogram with 2-D, M-mode and complete                     spectral and color flow Doppler.  Ordering Location: [facility]  Admission Status:  Inpatient  Study Information: Image quality for this study is technically difficult.    _______________________________________________________________________________________     CONCLUSIONS:      1. Technically difficult image quality.   2. The LV is not well visualized, however the LV function is probably normal based on limited views.   3. The right ventricle is not well visualized. probably normal systolic function.   4. There is moderate thickening of the aortic valve leaflets.   5. Structurally normal mitral valve with normal leaflet excursion.   6. Trace tricuspid regurgitation.   7. The inferior vena cava is dilated measuring 2.18 cm in diameter, (dilated >2.1cm) with normal inspiratory collapse (normal >50%) consistent with mildly elevated right atrial pressure (~8, range 5-10mmHg).   8. Estimated pulmonary artery systolic pressure is 26 mmHg, consistent with normal pulmonary artery pressure.    ________________________________________________________________________________________  FINDINGS:     Left Ventricle:  There is normal left ventricular diastolic function. Left ventricular endocardium is not well visualized.     Right Ventricle:  The right ventricle is not well visualized. Probably normal systolic function.     Left Atrium:  The left atrium is normal in size with an indexed volume of 16.30 ml/m².     Right Atrium:  The right atrium is normal in size.     Aortic Valve:  The aortic valve anatomy cannot be determined with normal systolic excursion. There is mild calcification of the aortic valve leaflets. There is moderate thickening of the aortic valve leaflets.     Mitral Valve:  Structurally normal mitral valve with normal leaflet excursion. There is trace mitral regurgitation.     Tricuspid Valve:  The tricuspid valve was not well visualized. There is trace tricuspid regurgitation. Estimated pulmonary artery systolic pressure is 26 mmHg, consistent with normal pulmonary artery pressure.     Pulmonic Valve:  The pulmonic valve was not well visualized.     Aorta:  The aortic root at the sinuses of Valsalva is normal in size, measuring 3.30 cm (indexed 1.52 cm/m²).     Systemic Veins:  The inferior vena cava is dilated measuring 2.18 cm in diameter, (dilated >2.1cm) with normal inspiratory collapse (normal >50%) consistent with mildly elevated right atrial pressure (~8, range 5-10mmHg).  ____________________________________________________________________  QUANTITATIVE DATA:  Left Ventricle Measurements: (Indexed to BSA)     IVSd (2D):   1.1 cm  LVPWd (2D):  0.9 cm  LVIDd (2D):  4.0 cm  LVIDs (2D):  2.8 cm  LV Mass:     129 g  59.1 g/m²     MVE Vmax:    0.59 m/s  MV A Vmax:    0.85 m/s  MV E/A:       0.69  e' lateral:   6.96 cm/s  e' medial:    5.98 cm/s  E/e' lateral: 8.43  E/e' medial:  9.82  E/e' Average: 9.07  MV DT:        195 msec    Aorta Measurements: (Normal range) (Indexed to BSA)     Sinuses of Valsalva: 3.30 cm (3.1 - 3.7 cm)       Left Atrium Measurements: (Indexed to BSA)  LA Diam 2D: 3.30 cm       LVOT / RVOT/ Qp/Qs Data: (Indexed to BSA)  LVOT Diameter: 1.80 cm  LVOT Area:     2.54 cm²    Mitral Valve Measurements:     MV E Vmax: 0.6 m/s  MV A Vmax: 0.8 m/s  MV E/A:    0.7       Tricuspid Valve Measurements:     TR Vmax:          2.1 m/s  TR Peak Gradient: 18.1 mmHg  RA Pressure:      8 mmHg  PASP:             26 mmHg    ________________________________________________________________________________________  Electronically signed on 4/26/2024 at 4:46:45 PM by Cookie Ordaz MD    *** Final ***

## 2024-05-17 NOTE — PROGRESS NOTE ADULT - SUBJECTIVE AND OBJECTIVE BOX
[facility]  Rich Sky PA-C  [address] [place] [address]  [phone]      INTERVAL HPI/OVERNIGHT EVENTS:  Pt s/e with wife at bedside  +peg  Tolerating diet and TF    MEDICATIONS  (STANDING):  albuterol/ipratropium for Nebulization 3 milliLiter(s) Nebulizer every 6 hours  aMIOdarone    Tablet 400  Oral   aMIOdarone    Tablet 200 milliGRAM(s) Oral daily  apixaban 2.5 milliGRAM(s) Oral two times a day  aspirin  chewable 81 milliGRAM(s) Enteral Tube daily  atorvastatin 80 milliGRAM(s) Oral at bedtime  buDESOnide    Inhalation Suspension 0.5 milliGRAM(s) Inhalation two times a day  ciprofloxacin     Tablet 500 milliGRAM(s) Oral every 12 hours  dextrose 10% Bolus 125 milliLiter(s) IV Bolus once  dextrose 5%. 1000 milliLiter(s) (100 mL/Hr) IV Continuous <Continuous>  dextrose 5%. 1000 milliLiter(s) (50 mL/Hr) IV Continuous <Continuous>  dextrose 50% Injectable 12.5 Gram(s) IV Push once  dextrose 50% Injectable 25 Gram(s) IV Push once  glucagon  Injectable 1 milliGRAM(s) IntraMuscular once  insulin glargine Injectable (LANTUS) 15 Unit(s) SubCutaneous at bedtime  insulin lispro (ADMELOG) corrective regimen sliding scale   SubCutaneous every 6 hours  metoprolol tartrate 25 milliGRAM(s) Oral two times a day  montelukast 10 milliGRAM(s) Oral daily  nystatin    Suspension 496393 Unit(s) Oral three times a day    MEDICATIONS  (PRN):  acetaminophen   Oral Liquid .. 650 milliGRAM(s) Oral every 6 hours PRN Temp greater or equal to 38.5C (101.3F)  dextrose Oral Gel 15 Gram(s) Oral once PRN Blood Glucose LESS THAN 70 milliGRAM(s)/deciliter      Allergies  No Known Allergies    PHYSICAL EXAM:   Vital Signs:  Vital Signs Last 24 Hrs  T(C): 36.9 (17 May 2024 04:00), Max: 36.9 (17 May 2024 04:00)  T(F): 98.4 (17 May 2024 04:00), Max: 98.4 (17 May 2024 04:00)  HR: 95 (17 May 2024 08:11) (84 - 100)  BP: 108/75 (17 May 2024 05:59) (98/66 - 155/83)  BP(mean): --  RR: 18 (17 May 2024 05:59) (18 - 20)  SpO2: 96% (17 May 2024 08:11) (91% - 100%)    Parameters below as of 17 May 2024 08:11  Patient On (Oxygen Delivery Method): nasal cannula, 2LPM      GENERAL:  Appears stated age  HEENT:  NC/AT  CHEST:  Full & symmetric excursion  HEART:  Regular rhythm  ABDOMEN:  Soft, non-tender, non-distended, +peg  EXTEREMITIES:  no cyanosis  SKIN:  No rash  NEURO:  Alert      LABS:                        11.4   9.21  )-----------( 314      ( 16 May 2024 07:41 )             37.5     05-17    144  |  107  |  17  ----------------------------<  185<H>  3.9   |  32<H>  |  1.30    Ca    9.7      17 May 2024 05:55  Phos  2.8     05-17  Mg     1.8     05-17        Urinalysis Basic - ( 17 May 2024 05:55 )    Color: x / Appearance: x / SG: x / pH: x  Gluc: 185 mg/dL / Ketone: x  / Bili: x / Urobili: x   Blood: x / Protein: x / Nitrite: x   Leuk Esterase: x / RBC: x / WBC x   Sq Epi: x / Non Sq Epi: x / Bacteria: x

## 2024-05-17 NOTE — PROGRESS NOTE ADULT - ASSESSMENT
83-year-old M with history of COPD on home O2 PRN, coronary artery disease s/p CABG,  hypertension, diabetes, hyperlipidemia, atrial fibrillation on Eliquis as well as chronic osteomyelitis who presents to the emergency department at [facility] complaining of rapid heart rate. Cardiology consulted for afib with rvr.    CAD, CABG, HTN, HLD, A fib with RVR, Acute CVA  - Now rate controlled Afib.  s/p Amio load of 5Gm.  Continue 200 mg PO daily  - Continue Eliquis.  Recommend 5 mg q12 as his creatinine has been normal x 2  days, especially, in the setting of underlying CVA.  Discussed with Dr. Solis today, who would D/C patient on 5 mg q12H  - Continue BB    - CT brain: moderate-sized evolving acute/subacute L MCA territory infarct but no hemorrhagic conversion.   - Neuro following   - Continue ASA and high intensity statin   - TTE (4/26) Technically difficult image quality, probably normal based on limited views    - BP, HR stable and controlled   - Monitor and replete Lytes. Keep K > 4 and Mg > 2    - GI following, s/p Peg placement, 5/16.  Tolerated procedure well  - DC planning to [facility] today, 5/17  - Stable from cardiac standpoint    Dena Maynard DNP, NP-C, AGACNP-C  Cardiology   Call TEAMS

## 2024-05-17 NOTE — DISCHARGE NOTE NURSING/CASE MANAGEMENT/SOCIAL WORK - NSDCPEFALRISK_GEN_ALL_CORE
For information on Fall & Injury Prevention, visit: [email or web address] OR  [email or web address] OR  https://www.cdc.gov/steadi/patient.html

## 2024-05-17 NOTE — DISCHARGE NOTE NURSING/CASE MANAGEMENT/SOCIAL WORK - NSDCVIVACCINE_GEN_ALL_CORE_FT
influenza, injectable, quadrivalent, preservative free; 18-Oct-2019 13:33; Faiza Mclaughlin (RN); [employer]; [id number] (Exp. Date: 30-Jun-2020); IntraMuscular; Deltoid Left.; 0.5 milliLiter(s); VIS (VIS Published: 15-Aug-2019, VIS Presented: 18-Oct-2019);   influenza, injectable, quadrivalent, preservative free; 26-Sep-2020 15:14; Cory Crooks (RN); [employer]; [id number] (Exp. Date: 30-Jun-2021); IntraMuscular; Deltoid Left.; 0.5 milliLiter(s); VIS (VIS Published: 15-Aug-2019, VIS Presented: 26-Sep-2020);   influenza, injectable, quadrivalent, preservative free; 06-Sep-2021 18:22; Aaron Zavala (RN); [employer]; [id number] (Exp. Date: 30-Jun-2022); IntraMuscular; Deltoid Left.; 0.5 milliLiter(s); VIS (VIS Published: 15-Aug-2019, VIS Presented: 06-Sep-2021);   zoster recombinant; 22-Mar-2023 16:59; Cookie Lucas (RN); [employer]; Ps3km (Exp. Date: 28-May-2024); IntraMuscular; Deltoid Left.; 0.5 milliLiter(s); VIS (VIS Published: 22-Mar-2023, VIS Presented: 22-Mar-2023);

## 2024-05-17 NOTE — PROGRESS NOTE ADULT - PROVIDER SPECIALTY LIST ADULT
Cardiology
Cardiology
Critical Care
Nephrology
Neurology
Pulmonology
Cardiology
Critical Care
Hospitalist
Hospitalist
Nephrology
Neurology
Physiatry
Pulmonology
Cardiology
Critical Care
Critical Care
Gastroenterology
Gastroenterology
Hospitalist
Nephrology
Nephrology
Neurology
Physiatry
Pulmonology
Pulmonology
Cardiology
Critical Care
Gastroenterology
Hospitalist
Pulmonology
Hospitalist
Hospitalist
Cardiology
Cardiology
Hospitalist

## 2024-05-17 NOTE — PROGRESS NOTE ADULT - ASSESSMENT
dysphagia  h/o CVA    s/p egd/peg  ok to use peg for meds, feeds, water  ok to have pureed diet PO as well, aspiration precautions  d/c planning    I reviewed the overnight course of events on the unit, re-confirming the patient history. I discussed the care with the patient  Differential diagnosis and plan of care discussed with patient after the evaluation  35 minutes spent on total encounter of which more than fifty percent of the encounter was spent counseling and/or coordinating care by the attending physician.

## 2024-05-17 NOTE — PROGRESS NOTE ADULT - NS ATTEND AMEND GEN_ALL_CORE FT
83-year-old M with history of COPD on home O2 PRN, coronary artery disease s/p CABG,  hypertension, diabetes, hyperlipidemia, atrial fibrillation on Eliquis as well as chronic osteomyelitis who presents to the emergency department at [facility] complaining of rapid heart rate. Cardiology consulted for afib with rvr.    - p/w PAF with RVR, with SOB at home in ED s/p Cardizem gtt, admits to had missed home BB   - on the morning of 4/27, patient was noted to have new onset aphasia and a code stroke was called.  He was deemed not a candidate for TNK as he is on AC.  He was found to have a L M3 occlusion but was deemed not a candidate for thrombectomy as occlusion too distal.    - Later that night, a RRT was called due to worsening NIH score noted by nursing.  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   - Continue ASA and statin   - Continue PO Amiodarone  - Continue Eliquis 2.5  mg BID. Dose reduced in the setting of renal function and age. Previous Cr of 1.4, if his renal function goes back to baseline of 1.4 would increase to 5 bid.  - BP labile 100-160's, had episode of hypotension in ICU needing pressors, pressors stopped as MAP now improved on midodrine.  - continue Lopressor 25mg q12 with hold parameters  - pulm for copd.  - DC planning to [facility] per primary.
83-year-old M with history of COPD on home O2 PRN, coronary artery disease s/p CABG,  hypertension, diabetes, hyperlipidemia, atrial fibrillation on Eliquis as well as chronic osteomyelitis who presents to the emergency department at [facility] complaining of rapid heart rate. Cardiology consulted for afib with rvr.    tele overnight SR   Now that he is back in NSR recommended to start Amio for maintenance but family was hesitant  Continue Lopressor 50mg Q8H change to toprol on dc   Tele monitoring
83-year-old M with history of COPD on home O2 PRN, coronary artery disease s/p CABG,  hypertension, diabetes, hyperlipidemia, atrial fibrillation on Eliquis as well as chronic osteomyelitis who presents to the emergency department at [facility] complaining of rapid heart rate. Cardiology consulted for afib with rvr.     - p/w PAF with RVR with SOB at home in ED s/p Cardizem gtt, admits to had missed home BB   - on the morning of 4/27, patient was noted to have new onset aphasia and a code stroke was called.  He was deemed not a candidate for TNK as he is on AC.  He was found to have a L M3 occlusion but was deemed not a candidate for thrombectomy as occlusion too distal.    - Repeat CT brain demonstrated moderate-sized evolving acute/subacute L MCA territory infarct but no hemorrhagic conversion.        - continue Lopressor, Amio with hold parameters    - Continue Eliquis 2.5  mg BID. Dose reduced in the setting of renal function and age. Previous Cr of 1.4, if his renal function goes back to baseline of 1.4 would place on full dose Eliquis for CVA protection   - GI following, planned for Peg placement today   -optimized for planned peg
83-year-old M with history of COPD on home O2 PRN, coronary artery disease s/p CABG,  hypertension, diabetes, hyperlipidemia, atrial fibrillation on Eliquis as well as chronic osteomyelitis who presents to the emergency department at [facility] complaining of rapid heart rate. Cardiology consulted for afib with rvr.    - p/w PAF with RVR, with SOB at home in ED s/p Cardizem gtt, admits to had missed home BB   - on the morning of 4/27, patient was noted to have new onset aphasia and a code stroke was called.  He was deemed not a candidate for TNK as he is on AC.  He was found to have a L M3 occlusion but was deemed not a candidate for thrombectomy as occlusion too distal.    - Later that night, a RRT was called due to worsening NIH score noted by nursing.  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   - Continue ASA and statin   - Continue PO Amiodarone  - eliquis is currently on hold for a possible peg, and now on lovenox  - optimized for a possible peg if necessary from CV POV.   - BP labile 100-160's, had episode of hypotension in ICU needing pressors, pressors stopped as MAP now improved on midodrine.  - continue Lopressor 25mg q12 with hold parameters  - pulm for copd.  - DC planning to [facility] per primary.
83-year-old M with history of COPD on home O2 PRN, coronary artery disease s/p CABG,  hypertension, diabetes, hyperlipidemia, atrial fibrillation on Eliquis as well as chronic osteomyelitis who presents to the emergency department at [facility] complaining of rapid heart rate. Cardiology consulted for afib with rvr.    - Now rate controlled Afib.  s/p Amio load of 5Gm.  Continue 200 mg PO daily  - Continue Eliquis.  Recommend 5 mg q12 as his creatinine has been normal x 2  days, especially, in the setting of underlying CVA.  Discussed with Dr. Solis today, who would D/C patient on 5 mg q12H  - Continue BB  - Continue ASA and high intensity statin   - GI following, s/p Peg placement, 5/16.  Tolerated procedure well  - DC planning to [facility] today, 5/17
83-year-old M with history of COPD on home O2 PRN, coronary artery disease s/p CABG,  hypertension, diabetes, hyperlipidemia, atrial fibrillation on Eliquis as well as chronic osteomyelitis who presents to the emergency department at [facility] complaining of rapid heart rate. Cardiology consulted for afib with rvr.    CAD, CABG, HTN, HLD, A fib  - Presenting with PAF with RVR  - At home he had noted some SOB, his wife checked his HR and noted it up was up to 160s so brought him to the ER, then placed on Cardizem gtt, had missed home BB   - on the morning of 4/27, patient was noted to have new onset aphasia and a code stroke was called.  He was deemed not a candidate for TNK as he is on AC.  He was found to have a L M3 occlusion but was deemed not a candidate for thrombectomy as occlusion too distal.    - Later that night, a RRT was called due to worsening NIH score noted by nursing.  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   - Continue aspirin and statin     - Tele w/ SR 80-100s   - Continue PO Amiodarone loading.   - Continue Eliquis 2.5mg BID. Dose reduced in the setting of renal function and age. Previous Cr of 1.4, if his renal function goes back to baseline of 1.4 would place on full dose Eliquis for CVA protection  - Pt w/ hypotension in ICU needing pressors, pressors stopped as MAP now improved on midodrine.  - -160s   - Would start on Lopressor 25mg q12.
83-year-old M with history of COPD on home O2 PRN, coronary artery disease s/p CABG,  hypertension, diabetes, hyperlipidemia, atrial fibrillation on Eliquis as well as chronic osteomyelitis who presents to the emergency department at [facility] complaining of rapid heart rate. Cardiology consulted for afib with rvr.    - p/w PAF with RVR, with SOB at home in ED s/p Cardizem gtt, admits to had missed home BB   - on the morning of 4/27, patient was noted to have new onset aphasia and a code stroke was called.  He was deemed not a candidate for TNK as he is on AC.  He was found to have a L M3 occlusion but was deemed not a candidate for thrombectomy as occlusion too distal.    - Later that night, a RRT was called due to worsening NIH score noted by nursing.  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   - Continue ASA and statin   - Continue PO Amiodarone loading.   - Continue Eliquis 2.5  mg BID. Dose reduced in the setting of renal function and age. Previous Cr of 1.4, if his renal function goes back to baseline of 1.4 would place on full dose Eliquis for CVA protection  - BP labile 100-160's, had episode of hypotension in ICU needing pressors, pressors stopped as MAP now improved on midodrine.  - continue Lopressor 25mg q12 with hold parameters  - pulm for copd.
83-year-old M with history of COPD on home O2 PRN, coronary artery disease s/p CABG,  hypertension, diabetes, hyperlipidemia, atrial fibrillation on Eliquis as well as chronic osteomyelitis who presents to the emergency department at [facility] complaining of rapid heart rate. Cardiology consulted for afib with rvr.    - p/w PAF with RVR, with SOB at home in ED s/p Cardizem gtt, admits to had missed home BB   - on the morning of 4/27, patient was noted to have new onset aphasia and a code stroke was called.  He was deemed not a candidate for TNK as he is on AC.  He was found to have a L M3 occlusion but was deemed not a candidate for thrombectomy as occlusion too distal.    - Later that night, a RRT was called due to worsening NIH score noted by nursing.  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   - Continue ASA and statin   - Continue PO Amiodarone  - eliquis is currently on hold for a possible peg. if not immediate plans consider FD lovenox.   - optimized for a possible peg if necessary from CV pOV.   - BP labile 100-160's, had episode of hypotension in ICU needing pressors, pressors stopped as MAP now improved on midodrine.  - continue Lopressor 25mg q12 with hold parameters  - pulm for copd.  - DC planning to [facility] per primary.
83-year-old M with history of COPD on home O2 PRN, coronary artery disease s/p CABG,  hypertension, diabetes, hyperlipidemia, atrial fibrillation on Eliquis as well as chronic osteomyelitis who presents to the emergency department at [facility] complaining of rapid heart rate. Cardiology consulted for afib with rvr.    - p/w PAF with RVR, with SOB at home in ED s/p Cardizem gtt, admits to had missed home BB   - on the morning of 4/27, patient was noted to have new onset aphasia and a code stroke was called.  He was deemed not a candidate for TNK as he is on AC.  He was found to have a L M3 occlusion but was deemed not a candidate for thrombectomy as occlusion too distal.    - Later that night, a RRT was called due to worsening NIH score noted by nursing.  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   - Continue ASA and statin   - Continue PO Amiodarone loading.   - Continue Eliquis 2.5  mg BID. Dose reduced in the setting of renal function and age. Previous Cr of 1.4, if his renal function goes back to baseline of 1.4 would place on full dose Eliquis for CVA protection  - BP labile 100-160's, had episode of hypotension in ICU needing pressors, pressors stopped as MAP now improved on midodrine.  - continue Lopressor 25mg q12 with hold parameters  - pulm for copd.  - DC planning to [facility] per primary
83-year-old M with history of COPD on home O2 PRN, coronary artery disease s/p CABG,  hypertension, diabetes, hyperlipidemia, atrial fibrillation on Eliquis as well as chronic osteomyelitis who presents to the emergency department at [facility] complaining of rapid heart rate. Cardiology consulted for afib with rvr.    - p/w PAF with RVR, with SOB at home in ED s/p Cardizem gtt, admits to had missed home BB   - on the morning of 4/27, patient was noted to have new onset aphasia and a code stroke was called.  He was deemed not a candidate for TNK as he is on AC.  He was found to have a L M3 occlusion but was deemed not a candidate for thrombectomy as occlusion too distal.    - Later that night, a RRT was called due to worsening NIH score noted by nursing.  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   - Continue ASA and statin   - Continue PO Amiodarone  - Continue Eliquis 2.5  mg BID. Dose reduced in the setting of renal function and age. Previous Cr of 1.4, if his renal function goes back to baseline of 1.4 would increase to 5 bid.  - BP labile 100-160's, had episode of hypotension in ICU needing pressors, pressors stopped as MAP now improved on midodrine.  - continue Lopressor 25mg q12 with hold parameters  - pulm for copd.  - DC planning to [facility] per primary.
83-year-old M with history of COPD on home O2 PRN, coronary artery disease s/p CABG,  hypertension, diabetes, hyperlipidemia, atrial fibrillation on Eliquis as well as chronic osteomyelitis who presents to the emergency department at [facility] complaining of rapid heart rate. Cardiology consulted for afib with rvr.    - p/w PAF with RVR, with SOB at home in ED s/p Cardizem gtt, admits to had missed home BB   - on the morning of 4/27, patient was noted to have new onset aphasia and a code stroke was called.  He was deemed not a candidate for TNK as he is on AC.  He was found to have a L M3 occlusion but was deemed not a candidate for thrombectomy as occlusion too distal.    - Later that night, a RRT was called due to worsening NIH score noted by nursing.  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   - Continue ASA and statin   - Continue PO Amiodarone loading.   - Continue Eliquis 2.5  mg BID. Dose reduced in the setting of renal function and age. Previous Cr of 1.4, if his renal function goes back to baseline of 1.4 would place on full dose Eliquis for CVA protection  - BP labile 100-160's, had episode of hypotension in ICU needing pressors, pressors stopped as MAP now improved on midodrine.  - continue Lopressor 25mg q12 with hold parameters
83-year-old M with history of COPD on home O2 PRN, coronary artery disease s/p CABG,  hypertension, diabetes, hyperlipidemia, atrial fibrillation on Eliquis as well as chronic osteomyelitis who presents to the emergency department at [facility] complaining of rapid heart rate. Cardiology consulted for afib with RVR.     Presented to the ER with AF with RVR  On 4/27, then noted to have new onset aphasia and code stroke was called, found to have L M3 occlusion but was deemed not a candidate for thrombectomy as occlusion too distal. Acute L MCA territory infarct.   Currently on ASA, statin  On dose reduced Eliquis 2.5mg BID, reduced for age and renal function  if bp still labile and has room would start on Lopressor 25mg q12.   Previous Cr of 1.4, if his renal function goes back to baseline of 1.4 would place on full dose Eliquis for CVA protection  At high risk for decompensation
83-year-old M with history of COPD on home O2 PRN, coronary artery disease s/p CABG,  hypertension, diabetes, hyperlipidemia, atrial fibrillation on Eliquis as well as chronic osteomyelitis who presents to the emergency department at [facility] complaining of rapid heart rate. Cardiology consulted for afib with rvr.     - p/w PAF with RVR with SOB at home in ED s/p Cardizem gtt, admits to had missed home BB   - on the morning of 4/27, patient was noted to have new onset aphasia and a code stroke was called.  He was deemed not a candidate for TNK as he is on AC.  He was found to have a L M3 occlusion but was deemed not a candidate for thrombectomy as occlusion too distal.    - Repeat CT brain demonstrated moderate-sized evolving acute/subacute L MCA territory infarct but no hemorrhagic conversion.        - continue Lopressor, Amio with hold parameters . Continue ASA, statin  - On Eliquis 2.5mg BID. Should be on 5mg BID based on normalization of his renal function and given his weight. Would increase to 5mg BID if no contraindication by neuro  - GI following, s/p PEG placement
83-year-old M with history of COPD on home O2 PRN, coronary artery disease s/p CABG,  hypertension, diabetes, hyperlipidemia, atrial fibrillation on Eliquis as well as chronic osteomyelitis who presents to the emergency department at [facility] complaining of rapid heart rate. Cardiology consulted for afib with rvr.    At home he had noted some SOB, his wife checked his HR and noted it up was up to 160s so brought him to the ER, then  placed on Cardizem gtt, had missed home BB   Now that he is back in NSR recommended to start Amio yesterday for maintenance but family was hesitant  Continue Lopressor 50mg Q8H  Obtain TTE, TFTs.   bp stable 120/71 on Lopressor every 8 hours

## 2024-05-17 NOTE — PROGRESS NOTE ADULT - SUBJECTIVE AND OBJECTIVE BOX
Patient is a 83y old  Male who presents with a chief complaint of AFib w/ RVR (07 May 2024 08:54)  Patient seen in follow up for hypernatremia, MERRILL.        PAST MEDICAL HISTORY:  Diabetes Mellitus, Type II    CAD (Coronary Artery Disease)    Dyslipidemia    Asymptomatic Peripheral Vascular Disease    Osteomyelitis    COPD (chronic obstructive pulmonary disease)    Diabetes mellitus    Hypertension    PVD (peripheral vascular disease)    History of PAT (paroxysmal atrial tachycardia)    Asthma with COPD    BPH (benign prostatic hyperplasia)    Acute osteomyelitis      MEDICATIONS  (STANDING):  albuterol/ipratropium for Nebulization 3 milliLiter(s) Nebulizer every 6 hours  aMIOdarone    Tablet 400  Oral   aMIOdarone    Tablet 200 milliGRAM(s) Oral daily  apixaban 2.5 milliGRAM(s) Oral two times a day  aspirin  chewable 81 milliGRAM(s) Enteral Tube daily  atorvastatin 80 milliGRAM(s) Oral at bedtime  buDESOnide    Inhalation Suspension 0.5 milliGRAM(s) Inhalation two times a day  ciprofloxacin     Tablet 500 milliGRAM(s) Oral every 12 hours  dextrose 10% Bolus 125 milliLiter(s) IV Bolus once  dextrose 5%. 1000 milliLiter(s) (50 mL/Hr) IV Continuous <Continuous>  dextrose 5%. 1000 milliLiter(s) (100 mL/Hr) IV Continuous <Continuous>  dextrose 50% Injectable 25 Gram(s) IV Push once  dextrose 50% Injectable 12.5 Gram(s) IV Push once  glucagon  Injectable 1 milliGRAM(s) IntraMuscular once  insulin glargine Injectable (LANTUS) 15 Unit(s) SubCutaneous at bedtime  insulin lispro (ADMELOG) corrective regimen sliding scale   SubCutaneous every 6 hours  metoprolol tartrate 25 milliGRAM(s) Oral two times a day  montelukast 10 milliGRAM(s) Oral daily  nystatin    Suspension 220506 Unit(s) Oral three times a day    MEDICATIONS  (PRN):  acetaminophen   Oral Liquid .. 650 milliGRAM(s) Oral every 6 hours PRN Temp greater or equal to 38.5C (101.3F)  dextrose Oral Gel 15 Gram(s) Oral once PRN Blood Glucose LESS THAN 70 milliGRAM(s)/deciliter    T(C): 36.9 (05-17-24 @ 04:00), Max: 36.9 (05-15-24 @ 13:56)  HR: 95 (05-17-24 @ 08:11) (78 - 100)  BP: 108/75 (05-17-24 @ 05:59) (98/66 - 155/83)  RR: 18 (05-17-24 @ 05:59)  SpO2: 96% (05-17-24 @ 08:11)  Wt(kg): --  I&O's Detail    16 May 2024 07:01  -  17 May 2024 07:00  --------------------------------------------------------  IN:  Total IN: 0 mL    OUT:    Incontinent per Condom Catheter (mL): 500 mL  Total OUT: 500 mL    Total NET: -500 mL                PHYSICAL EXAM:  General: No distress  Respiratory: b/l air entry  Cardiovascular: S1 S2  Gastrointestinal: soft  Extremities:  + edema                       LABORATORY:                        11.4   9.21  )-----------( 314      ( 16 May 2024 07:41 )             37.5     05-17    144  |  107  |  17  ----------------------------<  185<H>  3.9   |  32<H>  |  1.30    Ca    9.7      17 May 2024 05:55  Phos  2.8     05-17  Mg     1.8     05-17      Sodium: 144 mmol/L (05-17 @ 05:55)  Sodium: 144 mmol/L (05-16 @ 07:41)    Potassium: 3.9 mmol/L (05-17 @ 05:55)  Potassium: 3.6 mmol/L (05-16 @ 07:41)    Hemoglobin: 11.4 g/dL (05-16 @ 07:41)  Hemoglobin: 11.9 g/dL (05-15 @ 06:57)    Creatinine, Serum 1.30 (05-17 @ 05:55)  Creatinine, Serum 1.30 (05-16 @ 07:41)  Creatinine, Serum 1.60 (05-15 @ 06:57)          Urinalysis Basic - ( 17 May 2024 05:55 )    Color: x / Appearance: x / SG: x / pH: x  Gluc: 185 mg/dL / Ketone: x  / Bili: x / Urobili: x   Blood: x / Protein: x / Nitrite: x   Leuk Esterase: x / RBC: x / WBC x   Sq Epi: x / Non Sq Epi: x / Bacteria: x

## 2024-05-17 NOTE — PROGRESS NOTE ADULT - SUBJECTIVE AND OBJECTIVE BOX
Neurology Follow up note    YUE [id number]    HPI:  83yoM PMHx AFib on Eliquis, CAD, HTN, DM, HLD, COPD, osteomyelitis presents c/o shortness of breath, chest discomfort. Pt reports onset of rapid heart rate this morning, HR measured 160s when he checked his pulse ox. Also endorses dyspnea exacerbated by movement. Pt reports last episode of AFib in 2020, no episodes since then until today's presentation. Pt states that chest discomfort feels like chest pressure, no chest pain. Pt also reports chronic R big toe wound for the past few months, follows w/ Wound Care. States that he has increased sensitivity to R sole of foot, but denies pain, numbness/tingling. Denies fevers, chills, abdominal pain, N/V/D/C.     IN THE ED:  Temp 98  F ,  , /81  ,RR 18 , SpO2 94%  S/P PO , IV diltiazem 10mg x2, IV diltiazem gtt @ 10 mg/hr  Labs significant for BUN/Cr 25/1.6, troponin 507.9, pBNP 355  Imaging:   CXR wet read: no gross abnormalities (25 Apr 2024 12:13)      Interval History -Tolerating feeding    Patient is seen, chart was reviewed and case was discussed with the treatment team.  Pt is not in any distress.   Lying on bed comfortably.     Vital Signs Last 24 Hrs  T(C): 37.3 (17 May 2024 11:28), Max: 37.3 (17 May 2024 11:28)  T(F): 99.1 (17 May 2024 11:28), Max: 99.1 (17 May 2024 11:28)  HR: 101 (17 May 2024 14:16) (84 - 103)  BP: 132/80 (17 May 2024 11:28) (98/66 - 155/83)  BP(mean): --  RR: 18 (17 May 2024 11:28) (18 - 20)  SpO2: 96% (17 May 2024 14:16) (91% - 100%)    Parameters below as of 17 May 2024 14:16  Patient On (Oxygen Delivery Method): nasal cannula, 2LPM                        REVIEW OF SYSTEMS:    Constitutional: No fever, weight loss or fatigue  Eyes: No eye pain, visual disturbances, or discharge  ENT:  No difficulty hearing, tinnitus, vertigo; No sinus or throat pain  Neck: No pain or stiffness  Respiratory: No wheezing, chills or hemoptysis  Cardiovascular: No chest pain, palpitations, shortness of breath, dizziness  Gastrointestinal: No abdominal or epigastric pain. No nausea, vomiting or hematemesis  Genitourinary: No dysuria, frequency, hematuria or incontinence  Neurological: No headaches, numbness or tremors  Psychiatric: No depression, anxiety, mood swings or difficulty sleeping  Musculoskeletal: No joint pain or swelling; No muscle, back or extremity pain  Skin: No itching, burning, rashes or lesions   Lymph Nodes: No enlarged glands  Endocrine: No heat or cold intolerance; No hair loss,   Allergy and Immunologic: No hives or eczema    On Neurological Examination:    Mental Status - Pt is alert, awake, oriented X3.. Follows commands well and able to answer questions appropriately.Mood and affect  normal    Speech -  aphasia of mixed type    Cranial Nerves - Pupils 3 mm equal and reactive to light, extraocular eye movements intact. Pt has no visual field deficit.  Pt has right facial weakness  . Facial sensation is intact.Tongue - is in midline.    Muscle tone - is decreased on right  .      Motor Exam -right hemiparesis; RUE 1/5; LE 1-2/5   No shaking or tremors.    Sensory Exam -. Pt withdraws all extremities equally on stimulation. No asymmetry seen. No complaints of tingling, numbness.        coordination:    Finger to nose: normal-left        Deep tendon Reflexes - 2 plus all over.            Neck Supple -  Yes.      MEDICATIONS  (STANDING):  albuterol/ipratropium for Nebulization 3 milliLiter(s) Nebulizer every 6 hours  aMIOdarone    Tablet 400  Oral   aMIOdarone    Tablet 200 milliGRAM(s) Oral daily  apixaban 2.5 milliGRAM(s) Oral two times a day  aspirin  chewable 81 milliGRAM(s) Enteral Tube daily  atorvastatin 80 milliGRAM(s) Oral at bedtime  buDESOnide    Inhalation Suspension 0.5 milliGRAM(s) Inhalation two times a day  ciprofloxacin     Tablet 500 milliGRAM(s) Oral every 12 hours  dextrose 10% Bolus 125 milliLiter(s) IV Bolus once  dextrose 5%. 1000 milliLiter(s) (100 mL/Hr) IV Continuous <Continuous>  dextrose 5%. 1000 milliLiter(s) (50 mL/Hr) IV Continuous <Continuous>  dextrose 50% Injectable 25 Gram(s) IV Push once  dextrose 50% Injectable 12.5 Gram(s) IV Push once  glucagon  Injectable 1 milliGRAM(s) IntraMuscular once  insulin glargine Injectable (LANTUS) 15 Unit(s) SubCutaneous at bedtime  insulin lispro (ADMELOG) corrective regimen sliding scale   SubCutaneous every 6 hours  metoprolol tartrate 25 milliGRAM(s) Oral two times a day  montelukast 10 milliGRAM(s) Oral daily  nystatin    Suspension 291587 Unit(s) Oral three times a day    MEDICATIONS  (PRN):  acetaminophen   Oral Liquid .. 650 milliGRAM(s) Oral every 6 hours PRN Temp greater or equal to 38.5C (101.3F)  dextrose Oral Gel 15 Gram(s) Oral once PRN Blood Glucose LESS THAN 70 milliGRAM(s)/deciliter              Allergies    No Known Allergies    Intolerances                                 11.4   9.21  )-----------( 314      ( 16 May 2024 07:41 )             37.5                  05-17    144  |  107  |  17  ----------------------------<  185<H>  3.9   |  32<H>  |  1.30    Ca    9.7      17 May 2024 05:55  Phos  2.8     05-17  Mg     1.8     05-17                Hemoglobin A1C:     Vitamin B12     RADIOLOGY    ASSESSMENT AND PLAN:      Seen for right sided weakness/ams  consistent subacute left mca infarct(missed one dose of apixaban)  also multiple punctate subacute cerebellar infarcts  most likely cardio-embolic  sp PEG    For  SUSI today  aspiration precaution  ON AC and asa  continue statin  Physical therapy evaluation.  Pain is accessed and addressed.  Plan of care was discussed with family(wife_. Questions answered.

## 2024-05-26 NOTE — H&P ADULT - NSHPSOCIALHISTORY_GEN_ALL_CORE
Tobacco: denies  EtOH: denies  Recreational drugs: denies  Lives: [facility]  Ambulation: dependent  ADLs: dependent  Dietary restrictions: none, PEG tube

## 2024-05-26 NOTE — ED ADULT TRIAGE NOTE - CHIEF COMPLAINT QUOTE
weakness, barely responsive *2 days= fingerstick *1 hour ago was 401. patient has a h/o right sided stroke *1 month ago- on eliquiz. As per wife, patient is more responsive. patient is on oxygen 2 l;itres nasal cannula, and had a PEG tube.

## 2024-05-26 NOTE — H&P ADULT - ASSESSMENT
83yo M PMHx AF (on Eliquis), CAD (s/p multiple stents), HTN, DM, HLD, COPD, OM (L Femur, on Cipro), recently admitted to [facility] for RVR, hospital course complicated by new L M3 CVA, PEG tube placed 5/15, subsequently discharged to [facility]. Now presenting from rehab and admitted for new AMS, new B/L ischemic strokes.

## 2024-05-26 NOTE — H&P ADULT - PROBLEM SELECTOR PLAN 2
- New bilateral CVA's, hx Left M3 infarct  - CT Head No Con: 1.  Gyriform hyperdensity in the region of a left frontal parietal subacute infarct suggesting cortical laminar necrosis or hemorrhage. 2.  Small subacute high right frontal infarct. Subacute bilateral cerebellar infarcts.  - Out of window for thrombolytic therapy  - Heparin gtt  - MRI Brain Non Con in AM  - Neuro check q4h  - Dr. Underwood Neurology consulted  - C/w high-dose statin

## 2024-05-26 NOTE — H&P ADULT - ATTENDING COMMENTS
85 Y/o male with PMH of Lt MCA infarct with residual Lt facial droop and Rt sided motor deficit, hx of Afib, cardioembolic stroke, T2DM, PEG feed who presented to ED to be evaluated for lethargy associated with worsening functional status at rehab center. pt is accompanied by his wife who is source of information. pt was discharged from  on  found to have ischemic Lt MCA stroke with strict NPO on PEG. Pt was sent to rehab facility that is not able to take care of pt's medical needs which includes hyperglycemia. per wife facility is not capable of giving IV fluid despite he hasnt been able to eat or drink since his discharged    per wife pt also seems to have Lt sided weakness which seems to be there since his previous discharged    CT head in ED:   1.  Gyriform hyperdensity in the region of a left frontal parietal   subacute infarct suggesting cortical laminar necrosis or hemorrhage.  2.  Small subacute high right frontal infarct. Subacute bilateral   cerebellar infarcts.    above discussed with Pt's wife    CVA:  -to be continued with  heparin ggt, asa 81 mg   -MRI brain w/o contrast  in AM  -neurology consult   -neuro check q4h  -outside window for any TNK   -C/w statin     hyperglycemia:  -normal AG with acceptable pH 7.44.  -Lactic acid:1.5  -F/u blood ketones  -C/w insulin SS with finger stick q4h      hypernatremia/MERRILL:  -corrected for B  -monitor for overcorrection    -C/w 1/2 ns @ 100 cc/hr  -Obtain osmolality   -obtain urine NA      contraction alkalosis:  -monitor respiratory status closely  -continue with 1/2 NS   -Correct K on respective labs    PEG feed:  -nutrition consult  -resume feed with  water flushes

## 2024-05-26 NOTE — ED PROVIDER NOTE - CARE PLAN
1 Principal Discharge DX:	Suspected cerebrovascular accident (CVA)  Secondary Diagnosis:	Blood glucose elevated  Secondary Diagnosis:	Bronchitis  Secondary Diagnosis:	MERRILL (acute kidney injury)

## 2024-05-26 NOTE — ED ADULT NURSE NOTE - NSFALLHARMRISKINTERV_ED_ALL_ED

## 2024-05-26 NOTE — H&P ADULT - NSHPPHYSICALEXAM_GEN_ALL_CORE
T(C): 36.6 (05-26-24 @ 19:47), Max: 38.2 (05-26-24 @ 16:22)  HR: 100 (05-26-24 @ 19:47) (100 - 106)  BP: 122/63 (05-26-24 @ 19:47) (95/59 - 122/63)  RR: 22 (05-26-24 @ 19:47) (22 - 26)  SpO2: 97% (05-26-24 @ 19:47) (93% - 97%)    GENERAL: acutely ill-appearing, minimally interactive  EYES: Pupils B/L constricted with symmetric response to light. Does not comply with extraocular muscle exam. Sclera clear, no exudates  NECK: supple, soft  LUNGS: Exam limited d/t patient cooperation. Decreased breath sounds L base. Coarse sounds throughout.  HEART: tachycardic rate, regular rhythm. No murmurs appreciated  GASTROINTESTINAL: abdomen is soft, nontender, nondistended, normoactive bowel sounds. PEG tube in place without surrounding erythema.  EXTREMITY: no clubbing or cyanosis, no obvious deformity  MUSCULOSKELETAL: Squeezes left hand with good strength in response to command. Otherwise no muscle movement to command x4 limbs.  NEUROLOGIC: Awake. Phonates incomprehensibly and minimally in response to verbal stimuli. Does not answer questions, follows minimal commands. Unable to assess further.

## 2024-05-26 NOTE — ED PROVIDER NOTE - INDEPENDENTLY INTERPRETED
DIABETES PROGRESS REPORT     The patient was seen from 1306 to 1355 for Diabetes self-management education in a individual consult setting for a diagnosis of DM (diabetes mellitus), type 2, uncontrolled [E11.65] .  The instruction was given to the patient.  The barriers to self-care and learning limitations were assessed as physical challenge  Preferences for learning: Discussion, Demonstration, Reading, DVD, Internet and Small Groups     All nine content areas were assessed and the patients learning needs are:    diabetes disease process/treatment options, medical nutrition therapy, physical activity, medications, monitoring, acute complications, chronic complications, behavior change strategies and psychosocial adjustment         Hemoglobin A1C (%)   Date Value   03/31/2016 8.5 (H)     No results found for: CHOLESTEROL No results found for: HDL No components found for: LDL No results found for: TRIGLYCERIDE No results found for: CALCLDL   Creatinine (mg/dL)   Date Value   11/08/2017 0.48 (L)       Reason for Visit: Post Program Diabetes Education  and Insulin Titration      Current Diabetes Medications:   Metformin  mg 2 tablets with breakfast and 2 tablets with supper  Lantus 40-45 units based on blood sugar trend    Teaching was provided on the following:    Diabetes Disease Process and Treatment Options:  Definition, Types, Diagnostic criteria of diabetes, Causes, risk factors, Symptoms of diabetes, Importance of diabetes control, Ongoing education and Possible treatment changes/options.  Competent    Immunization History:    There is no immunization history on file for this patient.  RN and the patient discussed CDC recommendations for pneumococcal vaccines. Patient is up to date on annual influenza vaccine.    Medication list: Provided within AVS. and Patient is carrying a copy of their current medication list.    Diabetes identification card: Patient is carrying a form of diabetes  identification  Advance Directives: Not on file  done    Medical Nutrition Therapy:  RN Reviewed   First Steps to meal planning, Plate method introduction, Introduction to carbohydrate counting, Avoiding concentrated sweets and Including sugar-free fluids in meal plan.  Competent/needs reinforcement: RN reminded the patient if the risk for low blood sugar due to Lantus if skipping or delaying meals.   Patient is currently eating 2 meals a day, late night snacks.  RN advised three meals a day spaced 4-5 hours apart including 30-60 grams of carbs with a protein at each meal.    Physical Activity - Incorporating Activity into Lifestyle       Effects of physical activity on blood sugar, general health benefits, hypoglycemia prevention, adjusting food and blood sugar testing for planned or unplanned activity and continuing and maintaining current exercise plan.  Competent  RN encouraged the patient to be active in some way every day.                                      Blood Glucose Monitoring - Reviewed testing technique, times, record keeping, blood sugar targets, and sharps disposal.      Competent/needs reinforcement: Patient admits that she is not checking blood sugar very often. Patient forgot her glucometer at home today  RN encouraged the patient to test blood sugar at least 2 times a day.    Diabetes Medications  (dose, schedule, action, and side effects)  Competent  RN advised patient to take medication(s) with meals.  RN recommended that the patient carry a source of fast-acting sugar due to potential for hypoglycemia.    Acute Complications - Prevention Detection and Treatment:  Hypoglycemia (risks, causes, signs, symptoms, treatment, and prevention), Hyperglycemia (risks, causes, signs, symptoms, treatment, and prevention), Problem solving and when to call their provider, Safe driving practices, Medical identification use, Sick day care and Over the counter medications.  Competent  Patient reports that they  carry a source of fast acting sugar at all times.  Patient follows the rule of 15/15 when treating a low blood sugar.    Chronic Complications - Prevention, Detection & Treatment:  Risk reduction strategies for prevention and treatment of nephropathy, retinopathy, neuropathy, frequent infections, cardiovascular disease, Essential care guidelines (MD/Dental,Eye exams, Lower extremity exams), Daily self exams of feet, Signs/symptoms of problems or infections, Skin care, skin hygiene hygiene and General health benefits.  Competent  Last eye exam: 10/2017  Last dental exam: will call to schedule an appointment   Checks feet 3-4 times a week, Aidan Vallecillo PA-C also examined the patient's feet at the last appointment on 2/8/18   RN recommended follow up labs for A1c on or after 5/8/18    Diabetes Psychosocial Adjustment and Strategies:  Effects of stress on blood glucose, healthy coping strategies, problem-solving to identify individual stress triggers, tools to use to avoid/alter/adapt responses, depression risks/signs/symptoms/treatments and Provided with community resources and support information  Competent    Smoking  Benefits of smoking cessation and smoking cessation information provided.  Competent  Former Smoker    Comments:  BMP and lipid was done this morning. Patient has follow up with PCP on 3/14/18.    Fasting blood sugar this morning 97 mg/dL    Blood sugar in the officer on the office meter was 94 mg/dL.      RN reviewed the advance beneficiary notice with the patient for diabetes Self-Management Education and Medical Nutrition Therapy.  The patient reports that this is the subsequent  time that they are utilizing benefits for DSME.   ABN is valid 3/6/18 through 12/31/18.  Patient signed ABN.  ABN was sent to be scanned into the electronic medical record.    Follow up appointment with the diabetes educator (RN) is scheduled on 7/26/18.  Follow up appointment with Endocrine is scheduled on 6/12/18.    The  patient was encouraged to call if she has questions or concerns.    Written materials provided were:  Pneumococcal (prevner 13 and pneumovax) VISs, Daily Diabetes Meal Planning Guide     Goal Setting to Promote Health & Problem Solving for Daily Living was discussed.  Patient selected goal of:  Carry fast acting sugar  Patient achieved goal 100% of the time.  Program Goal: Carry diabetes ID card at all times.   Patient achieved goal 100% of the time.    Diabetes Self-Management Support Plan:  Diabetes educator contact number/business card  Family Support  Follow-up with DM educator every 3-6 months pending MD referral    Plan:  Test blood sugar 2 times a day, fasting and alternating lunch supper and bedtime  Call RD, RN, or MD if blood sugar readings are repeatedly outside of target range.  Work toward and then maintain and exercise program of 20 minutes, 3 days of the week.  Read food labels, use carb reference guide, and/or measure food to be more accurate in carb counting and controlling portion size.  See Patient Instructions for further information    Please contact us with any questions/concerns:  RY Moura, RN, CDE  [facility]    Report sent to referring provider: Rudi Art MD  Provider's order written  for DSME referral is located in the patient's computer chart.  Number of Medicare benefit hours used for DSME: 30 minutes out of 2 hours             Yes

## 2024-05-26 NOTE — H&P ADULT - HISTORY OF PRESENT ILLNESS
85yo M PMHx AF (on Eliquis), CAD (s/p multiple stents), HTN, DM, HLD, COPD, OM (L Femur, on Cipro), recently admitted to [facility] for RVR, hospital course complicated by new L M3 CVA, PEG tube placed 5/15, subsequently discharged to [facility]. Pt now presenting from Rehab with decline in mental status per wife Sania, at bedside. History obtained entirely from wife, as pt unable to speak presently. States she initially noticed him being "nonresponsive," noncommunicative on yesterday in the rehab, with no change into today. States that before this he did have slurred speech with waxing and waning dysarthria, however was baseline alert and oriented x3. Was also baseline with minimal to no motor function of RLE, RUE, with diminished motor function of LUE, LLE, was requiring assistance in all activities. Of note, wife states she noticed patient was not on Ciprofloxacin (chronic med for OM) while in rehab, and inquired as to why. No answer as to why, however patient was restarted on Cipro in Rehab.    ED Course:  Vitals: T 100.8 F, , BP 95/59, RR 26, SpO2 93% on 2L  Given: Ofirmev x1, Rocephin x1, 1L NS Bolus x2  Pertinent Labs: WBC 10.5, Na 150, HCO3 42, BUN/Cr 50/1.8, Glu 414. Mg 2.8. Lactate 2.2 --> 1.5. HsTi 185.6. Pro-.  AB.44 / 65 / 89 / 44    Imaging:  CT C/A/P No con: Layering secretions in the lower trachea and bilateral proximal mainstem  bronchi with scattered opacification of distal branching lobar and segmental airways bilaterally and with peribronchial wall thickening in lower lobes suggesting bronchitis. No lung consolidation. No source of infection identified in the abdomen or pelvis. PEG tube in place. Scattered colonic diverticula.  CT Head No Con: 1.  Gyriform hyperdensity in the region of a left frontal parietal subacute infarct suggesting cortical laminar necrosis or hemorrhage. 2.  Small subacute high right frontal infarct. Subacute bilateral cerebellar infarcts.    EKG: Sinus Tachycardia w/PVC on personal read; Rate 105, QTc 452

## 2024-05-26 NOTE — ED PROVIDER NOTE - GASTROINTESTINAL, MLM
INR not at goal. Medications, chart, and patient findings reviewed. See calendar for adjustments to dose and follow up plan.     
Abdomen soft, non-tender, no guarding, peg tube in place

## 2024-05-26 NOTE — H&P ADULT - PROBLEM SELECTOR PLAN 1
- Severe sepsis present on admission with T 100.8, , RR 26, Lactate 2.2 on admission  - Follow BCx, UCx  - Possible source from L Femur OM, as patient's cipro was held at Rehab since prior discharge for unknown reason  - ID Dr. العلي consulted, will follow recs  - Resume Cipro 500 mg BID

## 2024-05-26 NOTE — ED PROVIDER NOTE - SKILLED NURSING FACILITY NOTE SUMMARY FREE TEXT FOR MDM OBTAINED AND REVIEWED OLD RECORDS QUESTION
Skilled nursing facility notes reviewed, concern for elevated blood glucose and increased weakness and unresponsiveness

## 2024-05-26 NOTE — H&P ADULT - PROBLEM SELECTOR PLAN 8
- Resume tube feed  - Nutrition consult - Resume tube feed  - Nutrition consult    #Advanced Care Planning  - Pt DNR/DNI at Rehab, MOLST filled with wife Sania at bedside, placed in chart  - DNR/DNI

## 2024-05-26 NOTE — H&P ADULT - PROBLEM SELECTOR PLAN 4
- Hx T2DM with hyperglycemia > 400   - Ordered 21 Lantus QHS, with q6h MISS  - F/u FS and adjust accordingly  - Hypoglycemia protocol  - On PEG Tube feeding

## 2024-05-26 NOTE — ED PROVIDER NOTE - OBJECTIVE STATEMENT
84-year-old male PMH of A-fib on Eliquis and amiodarone, CAD, HTN, HLD, COPD, DM, CVA with left M3 occlusion, PEG placement, chronic osteomyelitis of left femur, wounds of right great toe and right heel, recent admission to [facility] 4/25 through 5/17/2024 and discharged to subacute rehab, wife at the bedside states for the past 2 to 3 days patient has been more lethargic, not able to do his PT due to lethargy, yesterday and today seemed more unresponsive and more lethargic, facility notes states concern for rule out CVA, and elevated blood sugars.

## 2024-05-26 NOTE — ED PROVIDER NOTE - IV ALTEPLASE EXCL ABS HIDDEN
show Spironolactone Counseling: Patient advised regarding risks of diarrhea, abdominal pain, hyperkalemia, birth defects (for female patients), liver toxicity and renal toxicity. The patient may need blood work to monitor liver and kidney function and potassium levels while on therapy. The patient verbalized understanding of the proper use and possible adverse effects of spironolactone.  All of the patient's questions and concerns were addressed.

## 2024-05-26 NOTE — ED PROVIDER NOTE - CLINICAL SUMMARY MEDICAL DECISION MAKING FREE TEXT BOX
84-year-old male with unresponsiveness, lethargy, will follow-up ABG, CBC, CMP, coagulation studies, blood cultures, lactic, UA, urine culture, placed on cardiac monitor and pulse oximeter, give oxygen as needed, obtain EKG, chest x-ray, CT head, CT chest, CT abdomen and pelvis, IV fluids, give antibiotics as needed and admit.

## 2024-05-26 NOTE — H&P ADULT - PROBLEM SELECTOR PLAN 3
- MERRILL with BUN 50, Cr 1.8. Baseline 1.3.    #Hypernatremia  - Corrected Na for blood glucose 155  - C/w 1/2 NS @ 100 cc/hr  - F/u urine NA, osmolality  - F/u AM BMP  - Nephro Dr. DOSHI consulted    #Contraction Alkalosis  - Monitor respiratory status closely  - Continuous Pulse ox  - Fluid resuscitation as above  - Follow AM Labs - MERRILL with BUN 50, Cr 1.8. Baseline 1.3.    #Hypernatremia  - Corrected Na for blood glucose 155  - C/w 1/2 NS @ 100 cc/hr  - F/u urine NA, osmolality  - F/u AM BMP  - Nephro Dr. Garcia consulted    #Contraction Alkalosis  - Monitor respiratory status closely  - Continuous Pulse ox  - Fluid resuscitation as above  - Follow AM Labs

## 2024-05-26 NOTE — ED ADULT NURSE NOTE - PRIMARY CARE PROVIDER
Pt presents to ED with c/o fever and abdominal pain. Pt states he feels like his white count is elevated, hx of colon cancer with colostomy. Pt febrile at the time of triage.    Sarah Avila

## 2024-05-26 NOTE — ED PROVIDER NOTE - DATE/TIME 1
Problem:   Goal: Effective Oral Intake  Outcome: Met     Problem:   Goal: Optimal Level of Comfort and Activity  Outcome: Met     Problem: [place]  Goal: Skin Health and Integrity  Outcome: Met     Problem:   Goal: Temperature Stability  Outcome: Met   Orders for discharge in, baby to go home with mom.    26-May-2024 19:10

## 2024-05-26 NOTE — ED ADULT NURSE NOTE - OBJECTIVE STATEMENT
Pt received in 15A 84y male AXO 4 from rehab non ambulatory c/o weakness. PMH T2DM, CAD, dyslipidemia, osteomyelitis, HTN, PVD, COPD, BPH, asthma, PAT. Pts wife states at rehab where pt was undergoing treatment for a recent stroke, pt began to become weak, and lethargic and pt was BIBA to be evaluated [facility]. Upon pt is lethargic, and unable to move, Peg tube noted in mid abd and flushes well, Stage II pressure injury noticed on sacrum and Bilateral buttocks, Right hand 22 placed, labs drawn, blood cultures drawn from 2 different sites 15 min apart, meds given as prescribed, ct performed. Pending results. Pt received in 15A 84y male AXO 4 from rehab non ambulatory c/o weakness. PMH T2DM, CAD, dyslipidemia, osteomyelitis, HTN, PVD, COPD, BPH, asthma, PAT. Pts wife states at rehab where pt was undergoing treatment for a recent stroke, pt began to become weak, and lethargic and pt was BIBA to be evaluated [facility]. Upon pt is lethargic, and unable to move, Peg tube noted in mid abd and flushes well, Stage II pressure injury noticed on sacrum and Bilateral buttocks, Right hand 22 placed, right 20 hand placed, labs drawn, blood cultures drawn from 2 different sites 15 min apart, meds given as prescribed, ct performed. Pending results. Pt received in 15A 84y male AXO 0 from rehab non ambulatory c/o weakness. PMH T2DM, CAD, dyslipidemia, osteomyelitis, HTN, PVD, COPD, BPH, asthma, PAT. Pts wife states at rehab where pt was undergoing treatment for a recent stroke, pt began to become weak, and lethargic and pt was BIBA to be evaluated [facility]. Upon pt is lethargic, and unable to move, Peg tube noted in mid abd and flushes well, Stage II pressure injury noticed on sacrum and Bilateral buttocks, Right hand 22 placed, right 20 hand placed, labs drawn, blood cultures drawn from 2 different sites 15 min apart, meds given as prescribed, ct performed. Pending results.

## 2024-05-27 NOTE — PROGRESS NOTE ADULT - PROBLEM SELECTOR PLAN 8
- Resume tube feed  - Nutrition consult    #Advanced Care Planning  - Pt DNR/DNI at Rehab, MOLST filled with wife Sania at bedside, placed in chart  - DNR/DNI

## 2024-05-27 NOTE — OCCUPATIONAL THERAPY INITIAL EVALUATION ADULT - TRANSFER TRAINING, PT EVAL
Pt will perform commode transfer using appropriate assistive device with maximum assist x 2 in 3-5 OT sessions.

## 2024-05-27 NOTE — CONSULT NOTE ADULT - SUBJECTIVE AND OBJECTIVE BOX
Patient is a 84y old  Male who presents with a chief complaint of Other specified symptoms and signs involving the circulatory and respiratory systems     (27 May 2024 13:07)      Reason For Consult: dm2 uncontrolled    HPI:  85yo M PMHx AF (on Eliquis), CAD (s/p multiple stents), HTN, DM, HLD, COPD, OM (L Femur, on Cipro), recently admitted to [facility] for RVR, hospital course complicated by new L M3 CVA, PEG tube placed 5/15, subsequently discharged to [facility]. Pt now presenting from Rehab with decline in mental status per wife Sania, at bedside. History obtained entirely from wife, as pt unable to speak presently. States she initially noticed him being "nonresponsive," noncommunicative on yesterday in the rehab, with no change into today. States that before this he did have slurred speech with waxing and waning dysarthria, however was baseline alert and oriented x3. Was also baseline with minimal to no motor function of RLE, RUE, with diminished motor function of LUE, LLE, was requiring assistance in all activities. Of note, wife states she noticed patient was not on Ciprofloxacin (chronic med for OM) while in rehab, and inquired as to why. No answer as to why, however patient was restarted on Cipro in Rehab.    ED Course:  Vitals: T 100.8 F, , BP 95/59, RR 26, SpO2 93% on 2L  Given: Ofirmev x1, Rocephin x1, 1L NS Bolus x2  Pertinent Labs: WBC 10.5, Na 150, HCO3 42, BUN/Cr 50/1.8, Glu 414. Mg 2.8. Lactate 2.2 --> 1.5. HsTi 185.6. Pro-.  AB.44 / 65 / 89 / 44    Imaging:  CT C/A/P No con: Layering secretions in the lower trachea and bilateral proximal mainstem  bronchi with scattered opacification of distal branching lobar and segmental airways bilaterally and with peribronchial wall thickening in lower lobes suggesting bronchitis. No lung consolidation. No source of infection identified in the abdomen or pelvis. PEG tube in place. Scattered colonic diverticula.  CT Head No Con: 1.  Gyriform hyperdensity in the region of a left frontal parietal subacute infarct suggesting cortical laminar necrosis or hemorrhage. 2.  Small subacute high right frontal infarct. Subacute bilateral cerebellar infarcts.    EKG: Sinus Tachycardia w/PVC on personal read; Rate 105, QTc 452 (26 May 2024 21:02)      PAST MEDICAL & SURGICAL HISTORY:  Diabetes Mellitus, Type II      CAD (Coronary Artery Disease)  s/p 3v CABG ; stents placed in [place] in 2019      Dyslipidemia      Osteomyelitis      COPD (chronic obstructive pulmonary disease)  on 2L at home and BiPAP at night; intubated       Hypertension      PVD (peripheral vascular disease)      History of PAT (paroxysmal atrial tachycardia)      Asthma with COPD      BPH (benign prostatic hyperplasia)      Acute osteomyelitis      CABG (Coronary Artery Bypass Graft)        Compound fracture  left leg      S/P primary angioplasty with coronary stent      H/O drainage of abscess  Left femur 2021          FAMILY HISTORY:  Family history of diabetes mellitus (Sibling)    Family hx of lung cancer  brother,  age 82, used to smoke with pt          Social History:    MEDICATIONS  (STANDING):  acetaminophen     Tablet .. 650 milliGRAM(s) Oral every 6 hours  albuterol    90 MICROgram(s) HFA Inhaler 2 Puff(s) Inhalation every 6 hours  aMIOdarone    Tablet 200 milliGRAM(s) Oral daily  atorvastatin 80 milliGRAM(s) Oral at bedtime  budesonide 160 MICROgram(s)/formoterol 4.5 MICROgram(s) Inhaler 2 Puff(s) Inhalation two times a day  dextrose 10% Bolus 125 milliLiter(s) IV Bolus once  dextrose 5%. 1000 milliLiter(s) (100 mL/Hr) IV Continuous <Continuous>  dextrose 5%. 1000 milliLiter(s) (50 mL/Hr) IV Continuous <Continuous>  dextrose 50% Injectable 12.5 Gram(s) IV Push once  dextrose 50% Injectable 25 Gram(s) IV Push once  glucagon  Injectable 1 milliGRAM(s) IntraMuscular once  heparin   Injectable 8000 Unit(s) IV Push once  heparin  Infusion.  Unit(s)/Hr (18 mL/Hr) IV Continuous <Continuous>  insulin glargine Injectable (LANTUS) 30 Unit(s) SubCutaneous at bedtime  insulin lispro (ADMELOG) corrective regimen sliding scale   SubCutaneous every 6 hours  insulin lispro Injectable (ADMELOG) 3 Unit(s) SubCutaneous every 6 hours  magnesium oxide 400 milliGRAM(s) Oral daily  metoprolol tartrate 25 milliGRAM(s) Oral two times a day  montelukast 10 milliGRAM(s) Oral at bedtime  piperacillin/tazobactam IVPB.- 3.375 Gram(s) IV Intermittent once  piperacillin/tazobactam IVPB.. 3.375 Gram(s) IV Intermittent every 8 hours  tiotropium 2.5 MICROgram(s) Inhaler 2 Puff(s) Inhalation daily  zinc sulfate 220 milliGRAM(s) Oral daily    MEDICATIONS  (PRN):  dextrose Oral Gel 15 Gram(s) Oral once PRN Blood Glucose LESS THAN 70 milliGRAM(s)/deciliter  heparin   Injectable 4000 Unit(s) IV Push every 6 hours PRN For aPTT between 40 - 57  heparin   Injectable 8000 Unit(s) IV Push every 6 hours PRN For aPTT less than 40        T(C): 36.6 (24 @ 05:21), Max: 38.2 (24 @ 16:22)  HR: 93 (24 @ 05:21) (93 - 106)  BP: 123/77 (24 @ 05:21) (95/59 - 137/82)  RR: 22 (24 @ 05:21) (22 - 26)  SpO2: 91% (24 @ 05:21) (90% - 97%)  Wt(kg): --    PHYSICAL EXAM:  GENERAL: NAD, well-groomed, well-developed  HEAD:  Atraumatic, Normocephalic  NECK: Supple, No JVD, Normal thyroid  CHEST/LUNG: Clear to percussion bilaterally; No rales, rhonchi, wheezing, or rubs  HEART: Regular rate and rhythm; No murmurs, rubs, or gallops  ABDOMEN: Soft, Nontender, Nondistended; Bowel sounds present  EXTREMITIES:  2+ Peripheral Pulses, No clubbing, cyanosis, or edema  SKIN: No rashes or lesions    CAPILLARY BLOOD GLUCOSE      POCT Blood Glucose.: 282 mg/dL (27 May 2024 13:36)  POCT Blood Glucose.: 247 mg/dL (27 May 2024 11:42)  POCT Blood Glucose.: 351 mg/dL (27 May 2024 05:56)  POCT Blood Glucose.: 362 mg/dL (27 May 2024 00:32)  POCT Blood Glucose.: 349 mg/dL (26 May 2024 22:08)  POCT Blood Glucose.: 326 mg/dL (26 May 2024 20:18)  POCT Blood Glucose.: 379 mg/dL (26 May 2024 15:07)                            10.8   15.99 )-----------( 210      ( 27 May 2024 07:27 )             39.4       CMP:   @ 07:27  SGPT 43  Albumin 2.4   Alk Phos 135   Anion Gap 0   SGOT 41   Total Bili 0.5   BUN 49   Calcium Total 9.2   CO2 37   Chloride 113   Creatinine 1.60   eGFR if AA --   eGFR if non AA --   Glucose 358   Potassium 4.5   Protein 6.0   Sodium 150      Thyroid Function Tests:   @ 17:10 TSH 4.00 FreeT4 -- T3 -- Anti TPO -- Anti Thyroglobulin Ab -- TSI --      Diabetes Tests:       Radiology:

## 2024-05-27 NOTE — DIETITIAN INITIAL EVALUATION ADULT - OTHER INFO
Pt is a "85yo M PMHx AF (on Eliquis), CAD (s/p multiple stents), HTN, DM, HLD, COPD, OM (L Femur, on Cipro), recently admitted to [facility] for RVR, hospital course complicated by new L M3 CVA, PEG tube placed 5/15, subsequently discharged to [facility]. Now presenting from rehab and admitted for new AMS, new B/L ischemic strokes."    Visited pt at bedside this am. Pt sleeping soundly during visit. Receiving tube feedings of Glucerna 1.5; not yet to goal rate, currently running at 30ml/hr. Tolerating TF well thus far. Plan to advance TF to goal rate of 55ml/hr x 24 hrs per MD order. No N/V per chart review. +BM 5/27. On IVFs- NS@100ml/hr. CBW on admission 220#. -225# per chart review. 2+ BL hand/arm edema noted. Skin: stage II coccyx, SDTI to R heel. Recommend Glucerna 1.5 to goal rate of 65ml/hr x 20 hours. Monitor TF tolerance. RD remains available and will continue to follow-up.

## 2024-05-27 NOTE — CONSULT NOTE ADULT - ASSESSMENT
Hypernatremia: decreased free water intake  MERRILL on CKD 3: Prerenal azotemia  Hypotension  Diabetes  h/o Atrial fibrillation  h/o CVA    Improving sodium levels. Increase free water with tube feeds. D/c IVF. Will avoid D5W for now given high blood sugar levels.   Monitor blood sugar levels. Insulin coverage as needed. Monitor BP trend. Avoid nephrotoxic meds as possible. Overall prognosis poor.     Further recommendations pending clinical course. Thank you for the courtesy of this referral.

## 2024-05-27 NOTE — OCCUPATIONAL THERAPY INITIAL EVALUATION ADULT - MD ORDER
MD orders for OT received, chart reviewed and contents noted. RN consulted prior to session, stated pt OK to participate.
Yes

## 2024-05-27 NOTE — CONSULT NOTE ADULT - ASSESSMENT
83yo M PMHx AF (on Eliquis), CAD (s/p multiple stents), HTN, DM, HLD, COPD, OM (L Femur, on Cipro), recently admitted to [facility] for RVR, hospital course complicated by new L M3 CVA, PEG tube placed 5/15, admitted from rehab with AMS. Palliative consulted for C

## 2024-05-27 NOTE — OCCUPATIONAL THERAPY INITIAL EVALUATION ADULT - MANUAL MUSCLE TESTING RESULTS, REHAB EVAL
Pt unable to participate in MMT/ROM or respond to commands. R UE/LE appears to be 0/5. Minimal movement in L UE notes, ~1/5 noted, however, exam limited by lethargy. +right shoulder sublux due to weakness.

## 2024-05-27 NOTE — DIETITIAN INITIAL EVALUATION ADULT - ENTERAL
Recommend Glucerna 1.5; advance to goal rate of 65ml/hr x 20 hours (to provide 1300ml TV formula, 1950kcal, 107gm protein, 988ml water) + autoflush 40ml/hr x 20hrs (additional 800ml free water)

## 2024-05-27 NOTE — OCCUPATIONAL THERAPY INITIAL EVALUATION ADULT - ADDITIONAL COMMENTS
Per chart review:  Pt presented from [facility] after hospitalization. At [facility], pt with minimal function of R UE/LE and required assist for all activities, but was A&Ox3.     Prior to hospitalizations, pt lived with his wife in a house with 8 WILLIAM with a rail, bedroom upstairs. Pt used a cane for functional mobility at times. Pt owns a stall shower. Pt was independent for all ADLs and functional mobility.

## 2024-05-27 NOTE — PHYSICAL THERAPY INITIAL EVALUATION ADULT - PERTINENT HX OF CURRENT PROBLEM, REHAB EVAL
Per H&P: pt is an "83yo M PMHx AF (on Eliquis), CAD (s/p multiple stents), HTN, DM, HLD, COPD, OM (L Femur, on Cipro), recently admitted to [facility] for RVR, hospital course complicated by new L M3 CVA, PEG tube placed 5/15, subsequently discharged to [facility]. Pt now presenting from Rehab with decline in mental status per wife Sania, at bedside. History obtained entirely from wife, as pt unable to speak presently. States she initially noticed him being "nonresponsive," noncommunicative on yesterday in the rehab, with no change into today. States that before this he did have slurred speech with waxing and waning dysarthria, however was baseline alert and oriented x3. Was also baseline with minimal to no motor function of RLE, RUE, with diminished motor function of LUE, LLE, was requiring assistance in all activities. Of note, wife states she noticed patient was not on Ciprofloxacin (chronic med for OM) while in rehab, and inquired as to why. No answer as to why, however patient was restarted on Cipro in Rehab." Patient requests all Lab, Cardiology, and Radiology Results on their Discharge Instructions

## 2024-05-27 NOTE — OCCUPATIONAL THERAPY INITIAL EVALUATION ADULT - RANGE OF MOTION EXAMINATION, UPPER EXTREMITY
edema to R UE limiting ROM, R UE positioned on pillow for comfort and to reduce edema./Left UE Passive ROM was WNL (within normal limits)/Right UE Passive ROM was WNL (within normal limits)

## 2024-05-27 NOTE — OCCUPATIONAL THERAPY INITIAL EVALUATION ADULT - BED MOBILITY TRAINING, PT EVAL
Pt will perform bed mobility with maximum assist x 2 to prepare for seated ADL tasks at EOB in 3-5 OT sessions.

## 2024-05-27 NOTE — PROGRESS NOTE ADULT - SUBJECTIVE AND OBJECTIVE BOX
Patient is a 84y old  Male who presents with a chief complaint of AMS, CVA (27 May 2024 11:04)      Subjective:  INTERVAL HPI/OVERNIGHT EVENTS: Patient seen and examined at bedside.  Patient unresponsive   MEDICATIONS  (STANDING):  acetaminophen     Tablet .. 650 milliGRAM(s) Oral every 6 hours  albuterol    90 MICROgram(s) HFA Inhaler 2 Puff(s) Inhalation every 6 hours  aMIOdarone    Tablet 200 milliGRAM(s) Oral daily  atorvastatin 80 milliGRAM(s) Oral at bedtime  budesonide 160 MICROgram(s)/formoterol 4.5 MICROgram(s) Inhaler 2 Puff(s) Inhalation two times a day  dextrose 10% Bolus 125 milliLiter(s) IV Bolus once  dextrose 5%. 1000 milliLiter(s) (100 mL/Hr) IV Continuous <Continuous>  dextrose 5%. 1000 milliLiter(s) (50 mL/Hr) IV Continuous <Continuous>  dextrose 50% Injectable 12.5 Gram(s) IV Push once  dextrose 50% Injectable 25 Gram(s) IV Push once  glucagon  Injectable 1 milliGRAM(s) IntraMuscular once  heparin   Injectable 8000 Unit(s) IV Push once  heparin  Infusion.  Unit(s)/Hr (18 mL/Hr) IV Continuous <Continuous>  insulin glargine Injectable (LANTUS) 30 Unit(s) SubCutaneous at bedtime  insulin lispro (ADMELOG) corrective regimen sliding scale   SubCutaneous every 6 hours  insulin lispro Injectable (ADMELOG) 3 Unit(s) SubCutaneous every 6 hours  magnesium oxide 400 milliGRAM(s) Oral daily  metoprolol tartrate 25 milliGRAM(s) Oral two times a day  montelukast 10 milliGRAM(s) Oral at bedtime  piperacillin/tazobactam IVPB. 3.375 Gram(s) IV Intermittent once  piperacillin/tazobactam IVPB.- 3.375 Gram(s) IV Intermittent once  piperacillin/tazobactam IVPB.. 3.375 Gram(s) IV Intermittent every 8 hours  tiotropium 2.5 MICROgram(s) Inhaler 2 Puff(s) Inhalation daily  zinc sulfate 220 milliGRAM(s) Oral daily    MEDICATIONS  (PRN):  dextrose Oral Gel 15 Gram(s) Oral once PRN Blood Glucose LESS THAN 70 milliGRAM(s)/deciliter  heparin   Injectable 4000 Unit(s) IV Push every 6 hours PRN For aPTT between 40 - 57  heparin   Injectable 8000 Unit(s) IV Push every 6 hours PRN For aPTT less than 40      Allergies    No Known Allergies    Intolerances        REVIEW OF SYSTEMS:  not able to obtain due to patient unresponsive       Objective:  Vital Signs Last 24 Hrs  T(C): 36.6 (27 May 2024 05:21), Max: 38.2 (26 May 2024 16:22)  T(F): 97.9 (27 May 2024 05:21), Max: 100.8 (26 May 2024 16:22)  HR: 93 (27 May 2024 05:21) (93 - 106)  BP: 123/77 (27 May 2024 05:21) (95/59 - 137/82)  BP(mean): --  RR: 22 (27 May 2024 05:21) (22 - 26)  SpO2: 91% (27 May 2024 05:21) (90% - 97%)    Parameters below as of 27 May 2024 05:21  Patient On (Oxygen Delivery Method): nasal cannula  O2 Flow (L/min): 4      GENERAL: NAD, lying in bed  HEAD:  Normocephalic  EYES:  conjunctiva and sclera clear, eye closed, Pupil reactive to light   ENT: Moist mucous membranes  NECK: Supple  CHEST/LUNG: + rhonchi; no wheezing. Unlabored respirations  HEART: Regular rate and rhythm; S1S2+  ABDOMEN: Bowel sounds present; Soft, Nontender, Nondistended. + peg   EXTREMITIES:  + distal Peripheral Pulses;  trace B/L ankle edema  NERVOUS SYSTEM:  not responsive to painful stimuli, not following any commend   MSK: no spontaneous limb movement  noted.   SKIN: No rashes     LABS:                        10.8   15.99 )-----------( 210      ( 27 May 2024 07:27 )             39.4     27 May 2024 07:27    150    |  113    |  49     ----------------------------<  358    4.5     |  37     |  1.60     Ca    9.2        27 May 2024 07:27  Mg     2.8       26 May 2024 17:10    TPro  6.0    /  Alb  2.4    /  TBili  0.5    /  DBili  x      /  AST  41     /  ALT  43     /  AlkPhos  135    27 May 2024 07:27    PT/INR - ( 27 May 2024 03:37 )   PT: 16.4 sec;   INR: 1.42 ratio         PTT - ( 27 May 2024 03:37 )  PTT:> 200 sec  Urinalysis Basic - ( 27 May 2024 07:27 )    Color: x / Appearance: x / SG: x / pH: x  Gluc: 358 mg/dL / Ketone: x  / Bili: x / Urobili: x   Blood: x / Protein: x / Nitrite: x   Leuk Esterase: x / RBC: x / WBC x   Sq Epi: x / Non Sq Epi: x / Bacteria: x      CAPILLARY BLOOD GLUCOSE      POCT Blood Glucose.: 351 mg/dL (27 May 2024 05:56)  POCT Blood Glucose.: 362 mg/dL (27 May 2024 00:32)  POCT Blood Glucose.: 349 mg/dL (26 May 2024 22:08)  POCT Blood Glucose.: 326 mg/dL (26 May 2024 20:18)  POCT Blood Glucose.: 379 mg/dL (26 May 2024 15:07)          RADIOLOGY & ADDITIONAL TESTS:    Personally reviewed.     Consultant(s) Notes Reviewed:  [x] YES  [ ] NO    Plan of care discussed with family wife ; all questions answered

## 2024-05-27 NOTE — CONSULT NOTE ADULT - PROBLEM SELECTOR RECOMMENDATION 9
lantus increased 30 units qhs  admelog 3 units q6hrs added  cont mod dose admelog corrective scale coverage q6hrs  goal bg 100-180 in hosp setting
with lethargy, unclear source though long standing hx of osteo  ID following  on broad spectrum IV abx

## 2024-05-27 NOTE — PROGRESS NOTE ADULT - PROBLEM SELECTOR PLAN 4
- Hx T2DM with hyperglycemia > 400   - Ordered 21 Lantus QHS, with q6h MISS  - F/u FS and adjust accordingly  - Hypoglycemia protocol  - On PEG Tube feeding - Hx T2DM with hyperglycemia > 400   -increased  Lantus  QHS, with q6h MISS and 3unit standing , will change to TID start from tonmorrow due to change of feeding schedule.   - F/u FS and adjust accordingly  - Hypoglycemia protocol  - On PEG Tube feeding

## 2024-05-27 NOTE — PROVIDER CONTACT NOTE (CRITICAL VALUE NOTIFICATION) - ACTION/TREATMENT ORDERED:
Dr. Madrid made aware. Heparin nomogram followed. stopped for an hour and decrease by 3. new rate 15.

## 2024-05-27 NOTE — PHYSICAL THERAPY INITIAL EVALUATION ADULT - GENERAL OBSERVATIONS, REHAB EVAL
Procedure:  COLONOSCOPY    Relevant Problems   CARDIO   (+) Hyperlipidemia   (+) Hypertension      ENDO   (+) Hypothyroidism      GI/HEPATIC   (+) Alcohol-induced acute pancreatitis   (+) GI bleed   (+) Gastroesophageal reflux disease without esophagitis      HEMATOLOGY   (+) Acute blood loss anemia      NEURO/PSYCH   (+) Generalized anxiety disorder      Lab Results   Component Value Date    WBC 3.05 (L) 07/11/2023    HGB 8.0 (L) 07/11/2023    HCT 22.0 (L) 07/11/2023     (H) 07/11/2023    PLT 89 (L) 07/11/2023         Physical Exam    Airway    Mallampati score: II  TM Distance: >3 FB  Neck ROM: full     Dental   No notable dental hx     Cardiovascular  Rhythm: regular, Rate: normal,     Pulmonary  Breath sounds clear to auscultation,     Other Findings  Intercisor Distance > 3cm          Anesthesia Plan  ASA Score- 2     Anesthesia Type- IV sedation with anesthesia with ASA Monitors. Additional Monitors:   Airway Plan:     Comment: NPO appropriate. Discussed benefits/risks of monitored anesthetic care which involves providing a dynamic level of mild to deep sedation. Complications include awareness and/or airway obstruction/aspiration which may necessitate conversion to general anesthesia. All questions answered. Patient understands and wishes to proceed. .       Plan Factors-Exercise tolerance (METS): >4 METS. Chart reviewed. EKG reviewed. Existing labs reviewed. Induction-     Postoperative Plan- Plan for postoperative opioid use. Informed Consent- Anesthetic plan and risks discussed with patient. I personally reviewed this patient with the CRNA. Discussed and agreed on the Anesthesia Plan with the CRNA. Johanna Barr Patient was received semi-supine in bed in NAD, +O2.

## 2024-05-27 NOTE — PROGRESS NOTE ADULT - PROBLEM SELECTOR PLAN 3
- MERRILL with BUN 50, Cr 1.8. Baseline 1.3.    #Hypernatremia  - Corrected Na for blood glucose 155  - C/w 1/2 NS @ 100 cc/hr  - F/u urine NA, osmolality  - F/u AM BMP  - Nephro Dr. Garcia consulted    #Contraction Alkalosis  - Monitor respiratory status closely  - Continuous Pulse ox  - Fluid resuscitation as above  - Follow AM Labs and hypernatremia   likely pre renal   add free water to peg.

## 2024-05-27 NOTE — OCCUPATIONAL THERAPY INITIAL EVALUATION ADULT - FINE MOTOR COORDINATION, LEFT HAND, MANIPULATION OF OBJECTS, OT EVAL
Received PAP shipment of Rybelsus from [place] w/ pt to  from [facility] M-F 8am-4pm, closed for lunch 12-1pm    Isaac CroweD    
unable to perform

## 2024-05-27 NOTE — DIETITIAN INITIAL EVALUATION ADULT - NS FNS DIET ORDER
Diet, NPO with Tube Feed:   Tube Feeding Modality: Gastrostomy  Glucerna 1.5  Total Volume for 24 Hours (mL): 1320  Continuous  Starting Tube Feed Rate {mL per Hour}: 10  Increase Tube Feed Rate by (mL): 10     Every 4 hours  Until Goal Tube Feed Rate (mL per Hour): 55  Tube Feed Duration (in Hours): 24  Tube Feed Start Time: 22:00  Free Water Flush  Pump   Rate (mL per Hour): 40   Frequency: Every Hour    Start Time: 22:00 (05-26-24 @ 21:41)

## 2024-05-27 NOTE — CONSULT NOTE ADULT - SUBJECTIVE AND OBJECTIVE BOX
Patient is a 84y old  Male who presents with a chief complaint of AMS, CVA (26 May 2024 21:02)    HPI:  83yo M PMHx AF (on Eliquis), CAD (s/p multiple stents), HTN, DM, HLD, COPD, OM (L Femur, on Cipro), recently admitted to [facility] for RVR, hospital course complicated by new L M3 CVA, PEG tube placed 5/15, subsequently discharged to [facility]. Pt now presenting from Rehab with decline in mental status per wife Mercedes, at bedside. History obtained entirely from wife, as pt unable to speak presently. States she initially noticed him being "nonresponsive," noncommunicative on yesterday in the rehab, with no change into today. States that before this he did have slurred speech with waxing and waning dysarthria, however was baseline alert and oriented x3. Was also baseline with minimal to no motor function of RLE, RUE, with diminished motor function of LUE, LLE, was requiring assistance in all activities. Of note, wife states she noticed patient was not on Ciprofloxacin (chronic med for OM) while in rehab, and inquired as to why. No answer as to why, however patient was restarted on Cipro in Rehab.    ED Course:  Vitals: T 100.8 F, , BP 95/59, RR 26, SpO2 93% on 2L  Given: Ofirmev x1, Rocephin x1, 1L NS Bolus x2  Pertinent Labs: WBC 10.5, Na 150, HCO3 42, BUN/Cr 50/1.8, Glu 414. Mg 2.8. Lactate 2.2 --> 1.5. HsTi 185.6. Pro-.  AB.44 / 65 / 89 / 44    Imaging:  CT C/A/P No con: Layering secretions in the lower trachea and bilateral proximal mainstem  bronchi with scattered opacification of distal branching lobar and segmental airways bilaterally and with peribronchial wall thickening in lower lobes suggesting bronchitis. No lung consolidation. No source of infection identified in the abdomen or pelvis. PEG tube in place. Scattered colonic diverticula.  CT Head No Con: 1.  Gyriform hyperdensity in the region of a left frontal parietal subacute infarct suggesting cortical laminar necrosis or hemorrhage. 2.  Small subacute high right frontal infarct. Subacute bilateral cerebellar infarcts.    EKG: Sinus Tachycardia w/PVC on personal read; Rate 105, QTc 452 (26 May 2024 21:02)      PAST MEDICAL HISTORY:  Diabetes Mellitus, Type II    CAD (Coronary Artery Disease)    Dyslipidemia    Asymptomatic Peripheral Vascular Disease    Osteomyelitis    COPD (chronic obstructive pulmonary disease)    Diabetes mellitus    Hypertension    PVD (peripheral vascular disease)    History of PAT (paroxysmal atrial tachycardia)    Asthma with COPD    BPH (benign prostatic hyperplasia)    Acute osteomyelitis        PAST SURGICAL HISTORY:  CABG (Coronary Artery Bypass Graft)    Compound fracture    S/P primary angioplasty with coronary stent    H/O drainage of abscess        FAMILY HISTORY:  Family history of diabetes mellitus (Sibling)    Family hx of lung cancer  brother,  age 82, used to smoke with pt        SOCIAL HISTORY:    Allergies    No Known Allergies    Intolerances      Home Medications:  acetaminophen 500 mg oral capsule: 2 cap(s) orally every 8 hours (26 May 2024 21:30)  amiodarone 200 mg oral tablet: 1 tablet with sensor orally once a day (26 May 2024 21:31)  ascorbic acid 500 mg oral tablet: 1 tab(s) orally once a day (26 May 2024 21:31)  aspirin 81 mg oral tablet: orally once a day (26 May 2024 21:31)  Breo Ellipta 200 mcg-25 mcg/inh inhalation powder: 1 puff(s) inhaled once a day (26 May 2024 21:31)  cholecalciferol 25 mcg (1000 intl units) oral tablet: 1 tab(s) orally once a day (26 May 2024 21:31)  ciprofloxacin 500 mg oral tablet: 1 tab(s) orally 2 times a day (26 May 2024 21:31)  Incruse Ellipta 62.5 mcg/inh inhalation powder: 1 inhaler(s) inhaled once a day (26 May 2024 21:31)  insulin glargine 100 units/mL subcutaneous solution: 15 unit(s) subcutaneous once a day (at bedtime) (26 May 2024 21:31)  ipratropium-albuterol 0.5 mg-2.5 mg/3 mL inhalation solution: 3 milliliter(s) inhaled every 6 hours as needed for  bronchospasm (26 May 2024 21:31)  magnesium oxide 400 mg oral tablet: 1 tab(s) orally once a day (26 May 2024 21:31)  metoprolol tartrate 25 mg oral tablet: 1 tab(s) orally 2 times a day (26 May 2024 21:31)  montelukast 10 mg oral tablet: 1 tab(s) orally once a day (26 May 2024 21:31)  Mounjaro 5 mg/0.5 mL subcutaneous solution: 5 milligram(s) subcutaneously once a week (26 May 2024 21:30)  Nucala 100 mg subcutaneous injection: 100 milligram(s) subcutaneously once a month (26 May 2024 21:30)  nystatin 100,000 units/mL oral suspension: 5 milliliter(s) orally 3 times a day (26 May 2024 21:31)  Omega-3 1000 mg oral capsule: 1 cap(s) orally once a day (26 May 2024 21:31)  Ozempic 2 mg/3 mL (0.25 mg or 0.5 mg dose) subcutaneous solution: 0.25mg once a week for 28 days then 0.5mg every week (26 May 2024 21:31)  Vitamin B Complex oral tablet: 1 tab(s) orally once a day (26 May 2024 21:31)  zinc sulfate 220 mg oral tablet: 1 tab(s) orally once a day (26 May 2024 21:31)    MEDICATIONS  (STANDING):  acetaminophen     Tablet .. 650 milliGRAM(s) Oral every 6 hours  albuterol    90 MICROgram(s) HFA Inhaler 2 Puff(s) Inhalation every 6 hours  aMIOdarone    Tablet 200 milliGRAM(s) Oral daily  atorvastatin 80 milliGRAM(s) Oral at bedtime  budesonide 160 MICROgram(s)/formoterol 4.5 MICROgram(s) Inhaler 2 Puff(s) Inhalation two times a day  ciprofloxacin     Tablet 500 milliGRAM(s) Oral two times a day  dextrose 10% Bolus 125 milliLiter(s) IV Bolus once  dextrose 5%. 1000 milliLiter(s) (100 mL/Hr) IV Continuous <Continuous>  dextrose 5%. 1000 milliLiter(s) (50 mL/Hr) IV Continuous <Continuous>  dextrose 50% Injectable 12.5 Gram(s) IV Push once  dextrose 50% Injectable 25 Gram(s) IV Push once  glucagon  Injectable 1 milliGRAM(s) IntraMuscular once  heparin   Injectable 8000 Unit(s) IV Push once  heparin  Infusion.  Unit(s)/Hr (18 mL/Hr) IV Continuous <Continuous>  insulin glargine Injectable (LANTUS) 21 Unit(s) SubCutaneous at bedtime  insulin lispro (ADMELOG) corrective regimen sliding scale   SubCutaneous every 6 hours  magnesium oxide 400 milliGRAM(s) Oral daily  metoprolol tartrate 25 milliGRAM(s) Oral two times a day  montelukast 10 milliGRAM(s) Oral at bedtime  sodium chloride 0.45%. 1000 milliLiter(s) (100 mL/Hr) IV Continuous <Continuous>  tiotropium 2.5 MICROgram(s) Inhaler 2 Puff(s) Inhalation daily  zinc sulfate 220 milliGRAM(s) Oral daily    MEDICATIONS  (PRN):  dextrose Oral Gel 15 Gram(s) Oral once PRN Blood Glucose LESS THAN 70 milliGRAM(s)/deciliter  heparin   Injectable 4000 Unit(s) IV Push every 6 hours PRN For aPTT between 40 - 57  heparin   Injectable 8000 Unit(s) IV Push every 6 hours PRN For aPTT less than 40      REVIEW OF SYSTEMS:  General:   Respiratory: No cough, SOB  Cardiovascular: No CP or Palpitations	  Gastrointestinal: No nausea, Vomiting. No diarrhea  Genitourinary: No urinary complaints	  Musculoskeletal: No leg swelling, No new rash or lesions	  Neurological: 	  all other systems negative    T(F): 97.9 (24 @ 05:21), Max: 100.8 (24 @ 16:22)  HR: 93 (24 @ 05:21) (93 - 106)  BP: 123/77 (24 @ 05:21) (95/59 - 137/82)  RR: 22 (24 @ 05:21) (22 - 26)  SpO2: 91% (24 @ 05:21) (90% - 97%)  Wt(kg): --    PHYSICAL EXAM:  General: NAD  Respiratory: b/l air entry  Cardiovascular: S1 S2  Gastrointestinal: soft  Extremities: edema            150<H>  |  108  |  50<H>  ----------------------------<  414<H>  5.1   |  42<H>  |  1.80<H>    Ca    10.2<H>      26 May 2024 17:10  Mg     2.8         TPro  7.2  /  Alb  2.9<L>  /  TBili  0.6  /  DBili  x   /  AST  57<H>  /  ALT  59  /  AlkPhos  171<H>                            10.8   15.99 )-----------( 210      ( 27 May 2024 07:27 )             39.4       Hemoglobin: 10.8 g/dL ( @ 07:27)  Hematocrit: 39.4 % ( @ 07:27)  Hemoglobin: 11.2 g/dL ( @ 03:37)  Hematocrit: 40.6 % ( @ 03:37)      Creatinine, Serum: 1.80 ( @ 17:10)      Urinalysis Basic - ( 26 May 2024 17:49 )    Color: Yellow / Appearance: Clear / S.027 / pH: x  Gluc: x / Ketone: Negative mg/dL  / Bili: Negative / Urobili: 1.0 mg/dL   Blood: x / Protein: 30 mg/dL / Nitrite: Negative   Leuk Esterase: Negative / RBC: 0 /HPF / WBC 1 /HPF   Sq Epi: x / Non Sq Epi: x / Bacteria: Occasional /HPF      LIVER FUNCTIONS - ( 26 May 2024 17:10 )  Alb: 2.9 g/dL / Pro: 7.2 g/dL / ALK PHOS: 171 U/L / ALT: 59 U/L / AST: 57 U/L / GGT: x           CARDIAC MARKERS ( 26 May 2024 17:10 )  x     / x     / x     / x     / 1.7 ng/mL            ABG - ( 26 May 2024 16:24 )  pH, Arterial: 7.44  pH, Blood: x     /  pCO2: 65    /  pO2: 89    / HCO3: 44    / Base Excess: 19.9  /  SaO2: 98.5              I&O's Detail         Patient is a 84y old  Male who presents with a chief complaint of AMS, CVA (26 May 2024 21:02)    HPI:  83yo M PMHx AF (on Eliquis), CAD (s/p multiple stents), HTN, DM, HLD, COPD, OM (L Femur, on Cipro), recently admitted to [facility] for RVR, hospital course complicated by new L M3 CVA, PEG tube placed 5/15, subsequently discharged to [facility]. Pt now presenting from Rehab with decline in mental status per wife Mercedes, at bedside. History obtained entirely from wife, as pt unable to speak presently. States she initially noticed him being "nonresponsive," noncommunicative on yesterday in the rehab, with no change into today. States that before this he did have slurred speech with waxing and waning dysarthria, however was baseline alert and oriented x3. Was also baseline with minimal to no motor function of RLE, RUE, with diminished motor function of LUE, LLE, was requiring assistance in all activities. Of note, wife states she noticed patient was not on Ciprofloxacin (chronic med for OM) while in rehab, and inquired as to why. No answer as to why, however patient was restarted on Cipro in Rehab.    ED Course:  Vitals: T 100.8 F, , BP 95/59, RR 26, SpO2 93% on 2L  Given: Ofirmev x1, Rocephin x1, 1L NS Bolus x2  Pertinent Labs: WBC 10.5, Na 150, HCO3 42, BUN/Cr 50/1.8, Glu 414. Mg 2.8. Lactate 2.2 --> 1.5. HsTi 185.6. Pro-.  AB.44 / 65 / 89 / 44    Imaging:  CT C/A/P No con: Layering secretions in the lower trachea and bilateral proximal mainstem  bronchi with scattered opacification of distal branching lobar and segmental airways bilaterally and with peribronchial wall thickening in lower lobes suggesting bronchitis. No lung consolidation. No source of infection identified in the abdomen or pelvis. PEG tube in place. Scattered colonic diverticula.  CT Head No Con: 1.  Gyriform hyperdensity in the region of a left frontal parietal subacute infarct suggesting cortical laminar necrosis or hemorrhage. 2.  Small subacute high right frontal infarct. Subacute bilateral cerebellar infarcts.    EKG: Sinus Tachycardia w/PVC on personal read; Rate 105, QTc 452 (26 May 2024 21:02)    Renal consult called for hypernatremia. History obtained from chart.       PAST MEDICAL HISTORY:  Diabetes Mellitus, Type II    CAD (Coronary Artery Disease)    Dyslipidemia    Asymptomatic Peripheral Vascular Disease    Osteomyelitis    COPD (chronic obstructive pulmonary disease)    Diabetes mellitus    Hypertension    PVD (peripheral vascular disease)    History of PAT (paroxysmal atrial tachycardia)    Asthma with COPD    BPH (benign prostatic hyperplasia)    Acute osteomyelitis        PAST SURGICAL HISTORY:  CABG (Coronary Artery Bypass Graft)    Compound fracture    S/P primary angioplasty with coronary stent    H/O drainage of abscess        FAMILY HISTORY:  Family history of diabetes mellitus (Sibling)    Family hx of lung cancer  brother,  age 82, used to smoke with pt        SOCIAL HISTORY: No smoking or alcohol use     Allergies    No Known Allergies    Intolerances      Home Medications:  acetaminophen 500 mg oral capsule: 2 cap(s) orally every 8 hours (26 May 2024 21:30)  amiodarone 200 mg oral tablet: 1 tablet with sensor orally once a day (26 May 2024 21:31)  ascorbic acid 500 mg oral tablet: 1 tab(s) orally once a day (26 May 2024 21:31)  aspirin 81 mg oral tablet: orally once a day (26 May 2024 21:31)  Breo Ellipta 200 mcg-25 mcg/inh inhalation powder: 1 puff(s) inhaled once a day (26 May 2024 21:31)  cholecalciferol 25 mcg (1000 intl units) oral tablet: 1 tab(s) orally once a day (26 May 2024 21:31)  ciprofloxacin 500 mg oral tablet: 1 tab(s) orally 2 times a day (26 May 2024 21:31)  Incruse Ellipta 62.5 mcg/inh inhalation powder: 1 inhaler(s) inhaled once a day (26 May 2024 21:31)  insulin glargine 100 units/mL subcutaneous solution: 15 unit(s) subcutaneous once a day (at bedtime) (26 May 2024 21:31)  ipratropium-albuterol 0.5 mg-2.5 mg/3 mL inhalation solution: 3 milliliter(s) inhaled every 6 hours as needed for  bronchospasm (26 May 2024 21:31)  magnesium oxide 400 mg oral tablet: 1 tab(s) orally once a day (26 May 2024 21:31)  metoprolol tartrate 25 mg oral tablet: 1 tab(s) orally 2 times a day (26 May 2024 21:31)  montelukast 10 mg oral tablet: 1 tab(s) orally once a day (26 May 2024 21:31)  Mounjaro 5 mg/0.5 mL subcutaneous solution: 5 milligram(s) subcutaneously once a week (26 May 2024 21:30)  Nucala 100 mg subcutaneous injection: 100 milligram(s) subcutaneously once a month (26 May 2024 21:30)  nystatin 100,000 units/mL oral suspension: 5 milliliter(s) orally 3 times a day (26 May 2024 21:31)  Omega-3 1000 mg oral capsule: 1 cap(s) orally once a day (26 May 2024 21:31)  Ozempic 2 mg/3 mL (0.25 mg or 0.5 mg dose) subcutaneous solution: 0.25mg once a week for 28 days then 0.5mg every week (26 May 2024 21:31)  Vitamin B Complex oral tablet: 1 tab(s) orally once a day (26 May 2024 21:31)  zinc sulfate 220 mg oral tablet: 1 tab(s) orally once a day (26 May 2024 21:31)    MEDICATIONS  (STANDING):  acetaminophen     Tablet .. 650 milliGRAM(s) Oral every 6 hours  albuterol    90 MICROgram(s) HFA Inhaler 2 Puff(s) Inhalation every 6 hours  aMIOdarone    Tablet 200 milliGRAM(s) Oral daily  atorvastatin 80 milliGRAM(s) Oral at bedtime  budesonide 160 MICROgram(s)/formoterol 4.5 MICROgram(s) Inhaler 2 Puff(s) Inhalation two times a day  ciprofloxacin     Tablet 500 milliGRAM(s) Oral two times a day  dextrose 10% Bolus 125 milliLiter(s) IV Bolus once  dextrose 5%. 1000 milliLiter(s) (100 mL/Hr) IV Continuous <Continuous>  dextrose 5%. 1000 milliLiter(s) (50 mL/Hr) IV Continuous <Continuous>  dextrose 50% Injectable 12.5 Gram(s) IV Push once  dextrose 50% Injectable 25 Gram(s) IV Push once  glucagon  Injectable 1 milliGRAM(s) IntraMuscular once  heparin   Injectable 8000 Unit(s) IV Push once  heparin  Infusion.  Unit(s)/Hr (18 mL/Hr) IV Continuous <Continuous>  insulin glargine Injectable (LANTUS) 21 Unit(s) SubCutaneous at bedtime  insulin lispro (ADMELOG) corrective regimen sliding scale   SubCutaneous every 6 hours  magnesium oxide 400 milliGRAM(s) Oral daily  metoprolol tartrate 25 milliGRAM(s) Oral two times a day  montelukast 10 milliGRAM(s) Oral at bedtime  sodium chloride 0.45%. 1000 milliLiter(s) (100 mL/Hr) IV Continuous <Continuous>  tiotropium 2.5 MICROgram(s) Inhaler 2 Puff(s) Inhalation daily  zinc sulfate 220 milliGRAM(s) Oral daily    MEDICATIONS  (PRN):  dextrose Oral Gel 15 Gram(s) Oral once PRN Blood Glucose LESS THAN 70 milliGRAM(s)/deciliter  heparin   Injectable 4000 Unit(s) IV Push every 6 hours PRN For aPTT between 40 - 57  heparin   Injectable 8000 Unit(s) IV Push every 6 hours PRN For aPTT less than 40      REVIEW OF SYSTEMS:  General: no distress, unable to obtain    T(F): 97.9 (24 @ 05:21), Max: 100.8 (24 @ 16:22)  HR: 93 (24 @ 05:21) (93 - 106)  BP: 123/77 (24 @ 05:21) (95/59 - 137/82)  RR: 22 (24 @ 05:21) (22 - 26)  SpO2: 91% (24 @ 05:21) (90% - 97%)  Wt(kg): --    PHYSICAL EXAM:  General: lethargic   Respiratory: b/l air entry  Cardiovascular: S1 S2  Gastrointestinal: soft  Extremities: edema            150<H>  |  108  |  50<H>  ----------------------------<  414<H>  5.1   |  42<H>  |  1.80<H>    Ca    10.2<H>      26 May 2024 17:10  Mg     2.8         TPro  7.2  /  Alb  2.9<L>  /  TBili  0.6  /  DBili  x   /  AST  57<H>  /  ALT  59  /  AlkPhos  171<H>                            10.8   15.99 )-----------( 210      ( 27 May 2024 07:27 )             39.4       Hemoglobin: 10.8 g/dL ( @ 07:27)  Hematocrit: 39.4 % ( @ 07:27)  Hemoglobin: 11.2 g/dL ( @ 03:37)  Hematocrit: 40.6 % ( @ 03:37)      Creatinine, Serum: 1.80 ( @ 17:10)      Urinalysis Basic - ( 26 May 2024 17:49 )    Color: Yellow / Appearance: Clear / S.027 / pH: x  Gluc: x / Ketone: Negative mg/dL  / Bili: Negative / Urobili: 1.0 mg/dL   Blood: x / Protein: 30 mg/dL / Nitrite: Negative   Leuk Esterase: Negative / RBC: 0 /HPF / WBC 1 /HPF   Sq Epi: x / Non Sq Epi: x / Bacteria: Occasional /HPF      LIVER FUNCTIONS - ( 26 May 2024 17:10 )  Alb: 2.9 g/dL / Pro: 7.2 g/dL / ALK PHOS: 171 U/L / ALT: 59 U/L / AST: 57 U/L / GGT: x           CARDIAC MARKERS ( 26 May 2024 17:10 )  x     / x     / x     / x     / 1.7 ng/mL          ABG - ( 26 May 2024 16:24 )  pH, Arterial: 7.44  pH, Blood: x     /  pCO2: 65    /  pO2: 89    / HCO3: 44    / Base Excess: 19.9  /  SaO2: 98.5            < from: CT Abdomen and Pelvis No Cont (24 @ 18:37) >    ACC: [id number] EXAM:  CT CHEST   ORDERED BY: SHUBHAM CARRILLO     ACC: [id number] EXAM:  CT ABDOMEN AND PELVIS   ORDERED BY: SHUBHAM CARRILLO     PROCEDURE DATE:  2024          INTERPRETATION:  CLINICAL INFORMATION: Rule out infection. Cough,   weakness. PEG tube.    COMPARISON: CTA chest 2020.    CONTRAST/COMPLICATIONS:  IV Contrast: NONE  Oral Contrast: NONE  Complications: None reported at time of study completion    PROCEDURE:  CT of the Chest, Abdomen and Pelvis was performed.  Sagittal and coronal reformats were performed.    FINDINGS:  CHEST:  LUNGS AND LARGE AIRWAYS: Layering secretions in the lower trachea and   bilateral proximal mainstem bronchi, scattered opacification of branching   lobar and segmental airwaysbilaterally and lower lobes with   peribronchial wall thickening. No lung consolidation. Respiratory motion   artifact noted Unchanged emphysema.  PLEURA: No pleural effusion.  VESSELS: Coronary stenting/calcifications. Moderate aortic   atherosclerotic calcification. No aneurysmal dilatation. Normal caliber   pulmonary trunk..  HEART: Status post CABG procedure. Heart size is normal. No pericardial   effusion.  MEDIASTINUM AND FRANKLYN: No lymphadenopathy.  CHEST WALL AND LOWER NECK: Within normal limits.    ABDOMEN AND PELVIS:  LIVER: Within normal limits.  BILE DUCTS: Normal caliber.  GALLBLADDER: Cholelithiasis. No evidence of cholecystitis.  SPLEEN: Within normal limits.  PANCREAS: Within normal limits.  ADRENALS: Within normal limits.  KIDNEYS/URETERS: Within normal limits.    BLADDER: Gonzáles catheter.  REPRODUCTIVE ORGANS: Prostate is enlarged.    BOWEL: PEG tube in position. No bowel obstruction. Scattered colonic   diverticula. Appendix is normal.  PERITONEUM: No ascites.  VESSELS: Atherosclerotic changes. Bilateral iliac artery stents.  RETROPERITONEUM/LYMPH NODES: No lymphadenopathy.  ABDOMINAL WALL: Periumbilical rectus diastases  BONES: Median sternotomy. Degenerative changes.    IMPRESSION:  Layering secretions in the lower trachea and bilateral proximal mainstem   bronchi with scattered opacification of distal branching lobar and   segmental airways bilaterally and with peribronchial wall thickening in   lower lobes suggesting bronchitis. No lung consolidation.  No source of infection identified in the abdomen or pelvis.    --- End of Report ---            JEAN PAUL MEDEL MD; Attending Radiologist  This document has been electronically signed. May 26 2024  7:01PM    < end of copied text >

## 2024-05-27 NOTE — CONSULT NOTE ADULT - CONVERSATION DETAILS
Met with wife at bedside today  introduced self and role. wife states waiting for work up to be completed to help understand why patient so lethargic and not waking up. reviewed plan for repeat imaging of brain to understand if new stroke or other issue contributing to mental status. explained that based on work up and understanding if what is occurring is reversible or not, will need to discuss next steps in care, especially as they pertain to quality of life. explained that our team helps to assist in conversations regarding what matters most and identifying what quality of life patients find acceptable. explained that in the event his mental status does not improve, will need to have a discussion about next steps in plan of care including wishes regarding end of life care. family verbalized understanding and we agreed to wait for neurology f/u and repeat imaging before having further GOC conversation. contact information provided.

## 2024-05-27 NOTE — CARE COORDINATION ASSESSMENT. - NSCAREPROVIDERS_GEN_ALL_CORE_FT
CARE PROVIDERS:  Accepting Physician: Louie Robles  Admitting: Doctor, Unknown  Attending: Doctor, Unknown  Consultant: Yoni Regan  Consultant: Stew Underwood  Consultant: Brandi العلي  Consultant: Marco Garcia  Covering Team: Stew Underwood  Covering Team: Austin Davenport  Covering Team: Louie Robles  ED Attending: Juwan Alston  ED Nurse: Alessandro Burdick  Nurse: Belen Avila  Nurse: Miri Nguyen  Nurse: Yudi Lema  Nurse: Quita Harper  Occupational Therapy: Elena Bland  Ordered: ServiceAccount, SCMMLM  Outpatient Provider: Oscar Marcelino  Outpatient Provider: Yoni Regan  Outpatient Provider: Pallavi Rod  Outpatient Provider: Edenilson Beck  Override: Belen Avila  Override: Yudi Lema  PCA/Nursing Assistant: Ben Gillis  Physical Therapy: Tyesha Uribe  Primary Team: Ancelmo Sky  Primary Team: Delma Paz  Primary Team: Richard Stock  Registered Dietitian: Karen Alonzo  : Alexandra Martin  Team: [facility], Team

## 2024-05-27 NOTE — DIETITIAN INITIAL EVALUATION ADULT - ORAL INTAKE PTA/DIET HISTORY
Pt admitted from [facility]. Reviewed transfer documents, pt was receiving peg feedings of Glucerna 1.5 at 55ml/hr until completion (was providing 1000ml TV formula, 1500kcal, 82.5gm protein) + LPS sugar-free 30ml BID.

## 2024-05-27 NOTE — CARE COORDINATION ASSESSMENT. - OTHER PERTINENT REFERRAL INFORMATION
SW met with pt at bedside; pt appearing confused at this time, not able to answer assessment questions. SW attempted to speak with pt's spouse Sania without success; left voicemail. Pt was admitted from [facility] for [facility]; SW to confirm with spouse if pt will prefer being discharged there again upon dc. Pt resides in a private home with spouse. There are 8 [address] and 13 steps inside the home. Pt prefers to use [facility].

## 2024-05-27 NOTE — CONSULT NOTE ADULT - SUBJECTIVE AND OBJECTIVE BOX
[facility]  INFECTIOUS DISEASES   [address] [place] [address]  Tel: [phone]     Fax: [phone]  ========================================================  MD Noman Perry Kaushal, MD Cho, Michelle, MD Sunjit, Jaspal, MD  ========================================================    [id number]  YUE COTTO     CC: Patient is a 84y old  Male who presents with a chief complaint of AMS, CVA (27 May 2024 11:25)    HPI:  85yo M PMHx AF (on Eliquis), CAD (s/p multiple stents), HTN, DM, HLD, COPD, OM (L Femur, on Cipro), recently admitted to [facility] for RVR, hospital course complicated by new L M3 CVA, PEG tube placed 5/15, subsequently discharged to [facility]. Pt now presenting from Rehab with decline in mental status per wife Sania, at bedside. History obtained entirely from wife, as pt unable to speak presently. States she initially noticed him being "nonresponsive," noncommunicative on yesterday in the rehab, with no change into today. States that before this he did have slurred speech with waxing and waning dysarthria, however was baseline alert and oriented x3. Was also baseline with minimal to no motor function of RLE, RUE, with diminished motor function of LUE, LLE, was requiring assistance in all activities. Of note, wife states she noticed patient was not on Ciprofloxacin (chronic med for OM) while in rehab, and inquired as to why. No answer as to why, however patient was restarted on Cipro in Rehab.  In ED T 100.8 F      PAST MEDICAL & SURGICAL HISTORY:  Diabetes Mellitus, Type II  CAD (Coronary Artery Disease)  s/p 3v CABG ; stents [place]   Dyslipidemia  Osteomyelitis  COPD (chronic obstructive pulmonary disease)  on 2L at home and BiPAP at night; intubated   Hypertension  PVD (peripheral vascular disease)  History of PAT (paroxysmal atrial tachycardia)  Asthma with COPD  BPH (benign prostatic hyperplasia)  Acute osteomyelitis  CABG (Coronary Artery Bypass Graft)    Compound fracture  left leg  S/P primary angioplasty with coronary stent  H/O drainage of abscess  Left femur 2021    Social Hx: No current smoking, EtOH or drugs     FAMILY HISTORY:  Family history of diabetes mellitus (Sibling)    Family hx of lung cancer  brother,  age 82, used to smoke with pt    Allergies  No Known Allergies    MEDICATIONS  (STANDING):  acetaminophen     Tablet .. 650 milliGRAM(s) Oral every 6 hours  albuterol    90 MICROgram(s) HFA Inhaler 2 Puff(s) Inhalation every 6 hours  aMIOdarone    Tablet 200 milliGRAM(s) Oral daily  atorvastatin 80 milliGRAM(s) Oral at bedtime  budesonide 160 MICROgram(s)/formoterol 4.5 MICROgram(s) Inhaler 2 Puff(s) Inhalation two times a day  dextrose 10% Bolus 125 milliLiter(s) IV Bolus once  dextrose 5%. 1000 milliLiter(s) (100 mL/Hr) IV Continuous <Continuous>  dextrose 5%. 1000 milliLiter(s) (50 mL/Hr) IV Continuous <Continuous>  dextrose 50% Injectable 12.5 Gram(s) IV Push once  dextrose 50% Injectable 25 Gram(s) IV Push once  glucagon  Injectable 1 milliGRAM(s) IntraMuscular once  heparin   Injectable 8000 Unit(s) IV Push once  heparin  Infusion.  Unit(s)/Hr (18 mL/Hr) IV Continuous <Continuous>  insulin glargine Injectable (LANTUS) 30 Unit(s) SubCutaneous at bedtime  insulin lispro (ADMELOG) corrective regimen sliding scale   SubCutaneous every 6 hours  insulin lispro Injectable (ADMELOG) 3 Unit(s) SubCutaneous every 6 hours  magnesium oxide 400 milliGRAM(s) Oral daily  metoprolol tartrate 25 milliGRAM(s) Oral two times a day  montelukast 10 milliGRAM(s) Oral at bedtime  piperacillin/tazobactam IVPB. 3.375 Gram(s) IV Intermittent once  piperacillin/tazobactam IVPB.- 3.375 Gram(s) IV Intermittent once  piperacillin/tazobactam IVPB.. 3.375 Gram(s) IV Intermittent every 8 hours  tiotropium 2.5 MICROgram(s) Inhaler 2 Puff(s) Inhalation daily  zinc sulfate 220 milliGRAM(s) Oral daily    MEDICATIONS  (PRN):  dextrose Oral Gel 15 Gram(s) Oral once PRN Blood Glucose LESS THAN 70 milliGRAM(s)/deciliter  heparin   Injectable 4000 Unit(s) IV Push every 6 hours PRN For aPTT between 40 - 57  heparin   Injectable 8000 Unit(s) IV Push every 6 hours PRN For aPTT less than 40     REVIEW OF SYSTEMS:  Unable     Physical Exam:  Vital Signs Last 24 Hrs  T(C): 36.6 (27 May 2024 05:21), Max: 38.2 (26 May 2024 16:22)  T(F): 97.9 (27 May 2024 05:21), Max: 100.8 (26 May 2024 16:22)  HR: 93 (27 May 2024 05:21) (93 - 106)  BP: 123/77 (27 May 2024 05:21) (95/59 - 137/82)  BP(mean): --  RR: 22 (27 May 2024 05:21) (22 - 26)  SpO2: 91% (27 May 2024 05:21) (90% - 97%)  Parameters below as of 27 May 2024 05:21  Patient On (Oxygen Delivery Method): nasal cannula  O2 Flow (L/min): 4  Height (cm): 180.3 ( @ 15:38)  Weight (kg): 99.8 ( @ 15:38)  BMI (kg/m2): 30.7 ( @ 15:38)  BSA (m2): 2.2 ( @ 15:38)  GEN: NAD lying bed on O2 with NC   HEENT: normocephalic and atraumatic.   NECK: Supple.  No lymphadenopathy   LUNGS: scattered rhonchi bilaterally   HEART: Irregular rate and rhythm  ABDOMEN: Soft, and nondistended. PEG in place looks fine  EXTREMITIES: Without edema. Left femur with scar and   fluctuation, no discharge from area   NEUROLOGIC: unable   PSYCHIATRIC: Not responding, lethargic   SKIN: No rash     Labs:      150<H>  |  113<H>  |  49<H>  ----------------------------<  358<H>  4.5   |  37<H>  |  1.60<H>    Ca    9.2      27 May 2024 07:27  Mg     2.8         TPro  6.0  /  Alb  2.4  /  TBili  0.5  /  DBili  x   /  AST  41<H>  /  ALT  43  /  AlkPhos  135<H>                          10.8   15.99 )-----------( 210      ( 27 May 2024 07:27 )             39.4     PT/INR - ( 27 May 2024 03:37 )   PT: 16.4 sec;   INR: 1.42 ratio    PTT - ( 27 May 2024 03:37 )  PTT:> 200 sec  Urinalysis Basic - ( 27 May 2024 07:27 )    Color: x / Appearance: x / SG: x / pH: x  Gluc: 358 mg/dL / Ketone: x  / Bili: x / Urobili: x   Blood: x / Protein: x / Nitrite: x   Leuk Esterase: x / RBC: x / WBC x   Sq Epi: x / Non Sq Epi: x / Bacteria: x    LIVER FUNCTIONS - ( 27 May 2024 07:27 )  Alb: 2.4 g/dL / Pro: 6.0 g/dL / ALK PHOS: 135 U/L / ALT: 43 U/L / AST: 41 U/L / GGT: x           CARDIAC MARKERS ( 26 May 2024 17:10 )  x     / x     / x     / x     / 1.7 ng/mL    ABG - ( 26 May 2024 16:24 )  pH, Arterial: 7.44  pH, Blood: x     /  pCO2: 65    /  pO2: 89    / HCO3: 44    / Base Excess: 19.9  /  SaO2: 98.5      SARS-CoV-2 Result: NotDetec (24 @ 17:10)    All imaging and other data have been reviewed.  < from: CT Abdomen and Pelvis No Cont (24 @ 18:37) >  IMPRESSION:  Layering secretions in the lower trachea and bilateral proximal mainstem   bronchi with scattered opacification of distal branching lobar and   segmental airways bilaterally and with peribronchial wall thickening in   lower lobes suggesting bronchitis. No lung consolidation.  No source of infection identified in the abdomen or pelvis.    CT head :  IMPRESSION:  1.  Gyriform hyperdensity in the region of a left frontal parietal   subacute infarct suggesting cortical laminar necrosis or hemorrhage.  2.  Small subacute high right frontal infarct. Subacute bilateral   cerebellar infarcts.    Assessment and Plan:   85yo man with PMH of HTN, CAD, DM2, Afib, COPD, OM (L Femur, on suppressive Cipro), recently admitted to [facility] for RVR, hospital course complicated by new L M3 CVA, PEG tube placed 5/15, subsequently discharged to [facility].   Pt now presenting from Rehab with decline in mental status currently not responding.   WBC 15-16k   In ED T 100.8 F  UA negative   Flu/RSV/COVID negative   CT chest/abd/p: bronchitis? otherwise negative   CT head: subacute infarcts     This could be another stroked or metabolic encephalopathy, less likely infectious, but since was not on his suppressive cipro in rehab and has signs of bronchitis will cover with zosyn until cultures are back.     # AMS  # Recent storke  # R/O aspiration / Bronchitis   # PEG in place  # Left femur OM    - Blood cultures x 2   - MRI of head and neurology follow up   - Start zosyn 3.375gm q8 for now until cultures are back   - Monitor WBC and Tmax     Thank you for courtesy of this consult.     Will follow.  Discussed with the primary team.     Brandi العلي MD  Division of Infectious Diseases   Please call ID service at [phone] with any question.    75 minutes spent on total encounter assessing patient, examination, chart review, counseling and coordinating care by the attending physician/nurse/care manager.   [facility]  INFECTIOUS DISEASES   [address] [place] [address]  Tel: [phone]     Fax: [phone]  ========================================================  MD Noman Perry Kaushal, MD Cho, Michelle, MD Sunjit, Jaspal, MD  ========================================================    [id number]  YUE COTTO     CC: Patient is a 84y old  Male who presents with a chief complaint of AMS, CVA (27 May 2024 11:25)    HPI:  85yo M PMHx AF (on Eliquis), CAD (s/p multiple stents), HTN, DM, HLD, COPD, OM (L Femur, on Cipro), recently admitted to [facility] for RVR, hospital course complicated by new L M3 CVA, PEG tube placed 5/15, subsequently discharged to [facility]. Pt now presenting from Rehab with decline in mental status per wife Sania, at bedside. History obtained entirely from wife, as pt unable to speak presently. States she initially noticed him being "nonresponsive," noncommunicative on yesterday in the rehab, with no change into today. States that before this he did have slurred speech with waxing and waning dysarthria, however was baseline alert and oriented x3. Was also baseline with minimal to no motor function of RLE, RUE, with diminished motor function of LUE, LLE, was requiring assistance in all activities. Of note, wife states she noticed patient was not on Ciprofloxacin (chronic med for OM) while in rehab, and inquired as to why. No answer as to why, however patient was restarted on Cipro in Rehab.  In ED T 100.8 F      PAST MEDICAL & SURGICAL HISTORY:  Diabetes Mellitus, Type II  CAD (Coronary Artery Disease)  s/p 3v CABG ; stents [place]   Dyslipidemia  Osteomyelitis  COPD (chronic obstructive pulmonary disease)  on 2L at home and BiPAP at night; intubated   Hypertension  PVD (peripheral vascular disease)  History of PAT (paroxysmal atrial tachycardia)  Asthma with COPD  BPH (benign prostatic hyperplasia)  Acute osteomyelitis  CABG (Coronary Artery Bypass Graft)    Compound fracture  left leg  S/P primary angioplasty with coronary stent  H/O drainage of abscess  Left femur 2021    Social Hx: No current smoking, EtOH or drugs     FAMILY HISTORY:  Family history of diabetes mellitus (Sibling)    Family hx of lung cancer  brother,  age 82, used to smoke with pt    Allergies  No Known Allergies    MEDICATIONS  (STANDING):  acetaminophen     Tablet .. 650 milliGRAM(s) Oral every 6 hours  albuterol    90 MICROgram(s) HFA Inhaler 2 Puff(s) Inhalation every 6 hours  aMIOdarone    Tablet 200 milliGRAM(s) Oral daily  atorvastatin 80 milliGRAM(s) Oral at bedtime  budesonide 160 MICROgram(s)/formoterol 4.5 MICROgram(s) Inhaler 2 Puff(s) Inhalation two times a day  dextrose 10% Bolus 125 milliLiter(s) IV Bolus once  dextrose 5%. 1000 milliLiter(s) (100 mL/Hr) IV Continuous <Continuous>  dextrose 5%. 1000 milliLiter(s) (50 mL/Hr) IV Continuous <Continuous>  dextrose 50% Injectable 12.5 Gram(s) IV Push once  dextrose 50% Injectable 25 Gram(s) IV Push once  glucagon  Injectable 1 milliGRAM(s) IntraMuscular once  heparin   Injectable 8000 Unit(s) IV Push once  heparin  Infusion.  Unit(s)/Hr (18 mL/Hr) IV Continuous <Continuous>  insulin glargine Injectable (LANTUS) 30 Unit(s) SubCutaneous at bedtime  insulin lispro (ADMELOG) corrective regimen sliding scale   SubCutaneous every 6 hours  insulin lispro Injectable (ADMELOG) 3 Unit(s) SubCutaneous every 6 hours  magnesium oxide 400 milliGRAM(s) Oral daily  metoprolol tartrate 25 milliGRAM(s) Oral two times a day  montelukast 10 milliGRAM(s) Oral at bedtime  piperacillin/tazobactam IVPB. 3.375 Gram(s) IV Intermittent once  piperacillin/tazobactam IVPB.- 3.375 Gram(s) IV Intermittent once  piperacillin/tazobactam IVPB.. 3.375 Gram(s) IV Intermittent every 8 hours  tiotropium 2.5 MICROgram(s) Inhaler 2 Puff(s) Inhalation daily  zinc sulfate 220 milliGRAM(s) Oral daily    MEDICATIONS  (PRN):  dextrose Oral Gel 15 Gram(s) Oral once PRN Blood Glucose LESS THAN 70 milliGRAM(s)/deciliter  heparin   Injectable 4000 Unit(s) IV Push every 6 hours PRN For aPTT between 40 - 57  heparin   Injectable 8000 Unit(s) IV Push every 6 hours PRN For aPTT less than 40     REVIEW OF SYSTEMS:  Unable     Physical Exam:  Vital Signs Last 24 Hrs  T(C): 36.6 (27 May 2024 05:21), Max: 38.2 (26 May 2024 16:22)  T(F): 97.9 (27 May 2024 05:21), Max: 100.8 (26 May 2024 16:22)  HR: 93 (27 May 2024 05:21) (93 - 106)  BP: 123/77 (27 May 2024 05:21) (95/59 - 137/82)  BP(mean): --  RR: 22 (27 May 2024 05:21) (22 - 26)  SpO2: 91% (27 May 2024 05:21) (90% - 97%)  Parameters below as of 27 May 2024 05:21  Patient On (Oxygen Delivery Method): nasal cannula  O2 Flow (L/min): 4  Height (cm): 180.3 ( @ 15:38)  Weight (kg): 99.8 ( @ 15:38)  BMI (kg/m2): 30.7 ( @ 15:38)  BSA (m2): 2.2 ( @ 15:38)  GEN: NAD lying bed on O2 with NC   HEENT: normocephalic and atraumatic.   NECK: Supple.  No lymphadenopathy   LUNGS: scattered rhonchi bilaterally   HEART: Irregular rate and rhythm  ABDOMEN: Soft, and nondistended. PEG in place looks fine  EXTREMITIES: Without edema. Left femur with scar and   fluctuation, no discharge from area   NEUROLOGIC: unable   PSYCHIATRIC: Not responding, lethargic   SKIN: No rash     Labs:      150<H>  |  113<H>  |  49<H>  ----------------------------<  358<H>  4.5   |  37<H>  |  1.60<H>    Ca    9.2      27 May 2024 07:27  Mg     2.8         TPro  6.0  /  Alb  2.4  /  TBili  0.5  /  DBili  x   /  AST  41<H>  /  ALT  43  /  AlkPhos  135<H>                          10.8   15.99 )-----------( 210      ( 27 May 2024 07:27 )             39.4     PT/INR - ( 27 May 2024 03:37 )   PT: 16.4 sec;   INR: 1.42 ratio    PTT - ( 27 May 2024 03:37 )  PTT:> 200 sec  Urinalysis Basic - ( 27 May 2024 07:27 )    Color: x / Appearance: x / SG: x / pH: x  Gluc: 358 mg/dL / Ketone: x  / Bili: x / Urobili: x   Blood: x / Protein: x / Nitrite: x   Leuk Esterase: x / RBC: x / WBC x   Sq Epi: x / Non Sq Epi: x / Bacteria: x    LIVER FUNCTIONS - ( 27 May 2024 07:27 )  Alb: 2.4 g/dL / Pro: 6.0 g/dL / ALK PHOS: 135 U/L / ALT: 43 U/L / AST: 41 U/L / GGT: x           CARDIAC MARKERS ( 26 May 2024 17:10 )  x     / x     / x     / x     / 1.7 ng/mL    ABG - ( 26 May 2024 16:24 )  pH, Arterial: 7.44  pH, Blood: x     /  pCO2: 65    /  pO2: 89    / HCO3: 44    / Base Excess: 19.9  /  SaO2: 98.5      SARS-CoV-2 Result: NotDetec (24 @ 17:10)    All imaging and other data have been reviewed.  < from: CT Abdomen and Pelvis No Cont (24 @ 18:37) >  IMPRESSION:  Layering secretions in the lower trachea and bilateral proximal mainstem   bronchi with scattered opacification of distal branching lobar and   segmental airways bilaterally and with peribronchial wall thickening in   lower lobes suggesting bronchitis. No lung consolidation.  No source of infection identified in the abdomen or pelvis.    CT head :  IMPRESSION:  1.  Gyriform hyperdensity in the region of a left frontal parietal   subacute infarct suggesting cortical laminar necrosis or hemorrhage.  2.  Small subacute high right frontal infarct. Subacute bilateral   cerebellar infarcts.    Assessment and Plan:   85yo man with PMH of HTN, CAD, DM2, Afib, COPD, OM (L Femur, on suppressive Cipro), recently admitted to [facility] for RVR, hospital course complicated by new L M3 CVA, PEG tube placed 5/15, subsequently discharged to [facility].   Pt now presenting from Rehab with decline in mental status currently not responding.   WBC 15-16k   In ED T 100.8 F  UA negative   Flu/RSV/COVID negative   CT chest/abd/p: bronchitis? otherwise negative   CT head: subacute infarcts     This could be another stroked or metabolic encephalopathy, less likely infectious, but since was not on his suppressive cipro in rehab and has signs of bronchitis will cover with zosyn until cultures are back.     # AMS  # Recent storke  # R/O aspiration / Bronchitis   # PEG in place  # Left femur OM    - Blood cultures x 2   - MRI of head and neurology follow up   - Stop ciprofloxacin  - Start zosyn 3.375gm q8 for now until cultures are back   - Monitor WBC and Tmax     Thank you for courtesy of this consult.     Will follow.  Discussed with the primary team.     Brandi العلي MD  Division of Infectious Diseases   Please call ID service at [phone] with any question.    75 minutes spent on total encounter assessing patient, examination, chart review, counseling and coordinating care by the attending physician/nurse/care manager.

## 2024-05-27 NOTE — PHYSICAL THERAPY INITIAL EVALUATION ADULT - ADDITIONAL COMMENTS
Pt unable to provide information, per EMR: Prior to hospitalizations, pt lived with his wife in a house with 8 [address] +HR, with 1 flight within the home. Pt occasionally ambulated with a SAC otherwise was independent.

## 2024-05-27 NOTE — PATIENT PROFILE ADULT - FALL HARM RISK - HARM RISK INTERVENTIONS
Assistance with ambulation/Assistance OOB with selected safe patient handling equipment/Communicate Risk of Fall with Harm to all staff/Discuss with provider need for PT consult/Monitor gait and stability/Reinforce activity limits and safety measures with patient and family/Tailored Fall Risk Interventions/Visual Cue: Yellow wristband and red socks/Bed in lowest position, wheels locked, appropriate side rails in place/Call bell, personal items and telephone in reach/Instruct patient to call for assistance before getting out of bed or chair/Non-slip footwear when patient is out of bed/[place] to call system/Physically safe environment - no spills, clutter or unnecessary equipment/Purposeful Proactive Rounding/Room/bathroom lighting operational, light cord in reach

## 2024-05-27 NOTE — PHYSICAL THERAPY INITIAL EVALUATION ADULT - BED MOBILITY TRAINING, PT EVAL
Patient will perform supine<->sit with maxAx1 by 2 weeks to allow patient to get in/out of bed safely.

## 2024-05-27 NOTE — OCCUPATIONAL THERAPY INITIAL EVALUATION ADULT - PERTINENT HX OF CURRENT PROBLEM, REHAB EVAL
85yo M PMHx AF (on Eliquis), CAD (s/p multiple stents), HTN, DM, HLD, COPD, OM (L Femur, on Cipro), recently admitted to [facility], hospital course complicated by new L M3 CVA, PEG tube placed 5/15, subsequently discharged to [facility]. Pt now presenting from Rehab with decline in mental status per wife Sania, at bedside. History obtained entirely from wife, as pt unable to speak presently. States she initially noticed him being "nonresponsive," noncommunicative on yesterday in the rehab, with no change into today. States that before this he did have slurred speech with waxing and waning dysarthria, however was baseline alert and oriented x3. Was also baseline with minimal to no motor function of RLE, RUE, with diminished motor function of LUE, LLE, was requiring assistance in all activities. Of note, wife states she noticed patient was not on Ciprofloxacin (chronic med for OM) while in rehab, and inquired as to why. No answer as to why, however patient was restarted on Cipro in Rehab.

## 2024-05-27 NOTE — PROGRESS NOTE ADULT - PROBLEM SELECTOR PLAN 1
- Severe sepsis present on admission with T 100.8, , RR 26, Lactate 2.2 on admission  - Follow BCx, UCx  - Possible source from L Femur OM, as patient's cipro was held at Rehab since prior discharge for unknown reason  - ID Dr. العلي consulted, will follow recs  - Resume Cipro 500 mg BID - Severe sepsis present on admission with T 100.8, , RR 26, Lactate 2.2 on admission  - Follow BCx, UCx  - Possible source from L Femur OM, as patient's cipro was held at Rehab since prior discharge for unknown reason  ?aspiration pneumonia   - ID Dr. العلي consulted, started on zosyn

## 2024-05-27 NOTE — CONSULT NOTE ADULT - PROBLEM SELECTOR RECOMMENDATION 3
DNR/DNI previously established and on chart  reviewed with wife at bedside re: palliative care consultation  will f/u after neurologic work up more complete to help establish whether this is a new vs. reversible baseline

## 2024-05-27 NOTE — DIETITIAN INITIAL EVALUATION ADULT - NSFNSPHYEXAMSKINFT_GEN_A_CORE
Pressure Injury 1: coccyx, sacrum, Stage II  Pressure Injury 2: Right:, DTI, Suspected deep tissue injury

## 2024-05-27 NOTE — DIETITIAN INITIAL EVALUATION ADULT - PERTINENT LABORATORY DATA
05-27    150<H>  |  113<H>  |  49<H>  ----------------------------<  358<H>  4.5   |  37<H>  |  1.60<H>    Ca    9.2      27 May 2024 07:27  Mg     2.8     05-26    TPro  6.0  /  Alb  2.4  /  TBili  0.5  /  DBili  x   /  AST  41<H>  /  ALT  43  /  AlkPhos  135<H>  05-27  POCT Blood Glucose.: 247 mg/dL (05-27-24 @ 11:42)  A1C with Estimated Average Glucose Result: 6.8 % (04-26-24 @ 08:05)

## 2024-05-27 NOTE — PROGRESS NOTE ADULT - PROBLEM SELECTOR PLAN 2
- New bilateral CVA's, hx Left M3 infarct  - CT Head No Con: 1.  Gyriform hyperdensity in the region of a left frontal parietal subacute infarct suggesting cortical laminar necrosis or hemorrhage. 2.  Small subacute high right frontal infarct. Subacute bilateral cerebellar infarcts.  - Out of window for thrombolytic therapy  - Heparin gtt  - MRI Brain Non Con in AM  - Neuro check q4h  - Dr. Underwood Neurology consulted  - C/w high-dose statin - New bilateral CVA's, hx Left M3 infarct  - CT Head No Con: 1.  Gyriform hyperdensity in the region of a left frontal parietal subacute infarct suggesting cortical laminar necrosis or hemorrhage. 2.  Small subacute high right frontal infarct. Subacute bilateral cerebellar infarcts.  - Out of window for thrombolytic therapy  - Heparin gtt  - repeat CT stable.   - Neuro check q4h  - Dr. Underwood Neurology consulted  - C/w high-dose statin  - MR head pending

## 2024-05-27 NOTE — OCCUPATIONAL THERAPY INITIAL EVALUATION ADULT - DIAGNOSIS, OT EVAL
Pt presents with impaired cognition, ADLs, functional mobility, strength, and functional activity tolerance.

## 2024-05-27 NOTE — CONSULT NOTE ADULT - PROBLEM SELECTOR RECOMMENDATION 4
will follow  Francia Banerjee MD, [facility]; Palliative Care Attending, Ethicist. [phone]

## 2024-05-27 NOTE — CONSULT NOTE ADULT - SUBJECTIVE AND OBJECTIVE BOX
HPI:  83yo M PMHx AF (on Eliquis), CAD (s/p multiple stents), HTN, DM, HLD, COPD, OM (L Femur, on Cipro), recently admitted to [facility] for RVR, hospital course complicated by new L M3 CVA, PEG tube placed 5/15, subsequently discharged to [facility]. Pt now presenting from Rehab with decline in mental status per wife Sania, at bedside. History obtained entirely from wife, as pt unable to speak presently. States she initially noticed him being "nonresponsive," noncommunicative on yesterday in the rehab, with no change into today. States that before this he did have slurred speech with waxing and waning dysarthria, however was baseline alert and oriented x3. Was also baseline with minimal to no motor function of RLE, RUE, with diminished motor function of LUE, LLE, was requiring assistance in all activities. Of note, wife states she noticed patient was not on Ciprofloxacin (chronic med for OM) while in rehab, and inquired as to why. No answer as to why, however patient was restarted on Cipro in Rehab.    ED Course:  Vitals: T 100.8 F, , BP 95/59, RR 26, SpO2 93% on 2L  Given: Ofirmev x1, Rocephin x1, 1L NS Bolus x2  Pertinent Labs: WBC 10.5, Na 150, HCO3 42, BUN/Cr 50/1.8, Glu 414. Mg 2.8. Lactate 2.2 --> 1.5. HsTi 185.6. Pro-.  AB.44 / 65 / 89 / 44    Imaging:  CT C/A/P No con: Layering secretions in the lower trachea and bilateral proximal mainstem  bronchi with scattered opacification of distal branching lobar and segmental airways bilaterally and with peribronchial wall thickening in lower lobes suggesting bronchitis. No lung consolidation. No source of infection identified in the abdomen or pelvis. PEG tube in place. Scattered colonic diverticula.  CT Head No Con: 1.  Gyriform hyperdensity in the region of a left frontal parietal subacute infarct suggesting cortical laminar necrosis or hemorrhage. 2.  Small subacute high right frontal infarct. Subacute bilateral cerebellar infarcts.    EKG: Sinus Tachycardia w/PVC on personal read; Rate 105, QTc 452 (26 May 2024 21:02)    PERTINENT PM/SXH:   Diabetes Mellitus, Type II    CAD (Coronary Artery Disease)    Dyslipidemia    Asymptomatic Peripheral Vascular Disease    Osteomyelitis    COPD (chronic obstructive pulmonary disease)    Diabetes mellitus    Hypertension    PVD (peripheral vascular disease)    History of PAT (paroxysmal atrial tachycardia)    Asthma with COPD    BPH (benign prostatic hyperplasia)    Acute osteomyelitis      CABG (Coronary Artery Bypass Graft)    Compound fracture    S/P primary angioplasty with coronary stent    H/O drainage of abscess      FAMILY HISTORY:  Family history of diabetes mellitus (Sibling)    Family hx of lung cancer  brother,  age 82, used to smoke with pt      Family Hx substance abuse [ ]yes [ ]no  ITEMS NOT CHECKED ARE NOT PRESENT    SOCIAL HISTORY:   Significant other/partner[ ]  Children[ ]  Scientology/Spirituality:  Substance hx:  [ ]   Tobacco hx:  [ ]   Alcohol hx: [ ]   Home Opioid hx:  [ ] I-Stop Reference No:  Living Situation: [ ]Home  [ ]Long term care  [ ]Rehab [ ]Other    ADVANCE DIRECTIVES:    DNR/MOLST  [ ]  Living Will  [ ]   DECISION MAKER(s):  [ ] Health Care Proxy(s)  [ ] Surrogate(s)  [ ] Guardian           Name(s): Phone Number(s):    BASELINE (I)ADL(s) (prior to admission):  [place]: [ ]Total  [ ] Moderate [ ]Dependent    Allergies    No Known Allergies    Intolerances    MEDICATIONS  (STANDING):  acetaminophen     Tablet .. 650 milliGRAM(s) Oral every 6 hours  albuterol    90 MICROgram(s) HFA Inhaler 2 Puff(s) Inhalation every 6 hours  aMIOdarone    Tablet 200 milliGRAM(s) Oral daily  atorvastatin 80 milliGRAM(s) Oral at bedtime  budesonide 160 MICROgram(s)/formoterol 4.5 MICROgram(s) Inhaler 2 Puff(s) Inhalation two times a day  ciprofloxacin     Tablet 500 milliGRAM(s) Oral two times a day  dextrose 10% Bolus 125 milliLiter(s) IV Bolus once  dextrose 5%. 1000 milliLiter(s) (100 mL/Hr) IV Continuous <Continuous>  dextrose 5%. 1000 milliLiter(s) (50 mL/Hr) IV Continuous <Continuous>  dextrose 50% Injectable 12.5 Gram(s) IV Push once  dextrose 50% Injectable 25 Gram(s) IV Push once  glucagon  Injectable 1 milliGRAM(s) IntraMuscular once  heparin   Injectable 8000 Unit(s) IV Push once  heparin  Infusion.  Unit(s)/Hr (18 mL/Hr) IV Continuous <Continuous>  insulin glargine Injectable (LANTUS) 30 Unit(s) SubCutaneous at bedtime  insulin lispro (ADMELOG) corrective regimen sliding scale   SubCutaneous every 6 hours  magnesium oxide 400 milliGRAM(s) Oral daily  metoprolol tartrate 25 milliGRAM(s) Oral two times a day  montelukast 10 milliGRAM(s) Oral at bedtime  tiotropium 2.5 MICROgram(s) Inhaler 2 Puff(s) Inhalation daily  zinc sulfate 220 milliGRAM(s) Oral daily    MEDICATIONS  (PRN):  dextrose Oral Gel 15 Gram(s) Oral once PRN Blood Glucose LESS THAN 70 milliGRAM(s)/deciliter  heparin   Injectable 4000 Unit(s) IV Push every 6 hours PRN For aPTT between 40 - 57  heparin   Injectable 8000 Unit(s) IV Push every 6 hours PRN For aPTT less than 40    PRESENT SYMPTOMS: [ ]Unable to self-report  [ ] CPOT [ ] PAINADs [ ] RDOS  Source if other than patient:  [ ]Family   [ ]Team     Pain: [ ]yes [ ]no  QOL impact -   Location -                    Aggravating factors -  Quality -  Radiation -  Timing-  Severity (0-10 scale):  Minimal acceptable level (0-10 scale):     CPOT:    https://www.sccm.org/getattachment/[email or web address]-[email or web address]-0755p0414y1u/Critical-Care-Pain-Observation-Tool-(CPOT)    PAIN AD Score:   http://geriatrictoolkit.[email or web address]/cog/painad.pdf (press ctrl +  left click to view)    Dyspnea:                           [ ]Mild [ ]Moderate [ ]Severe      RDOS:  0 to 2  minimal or no respiratory distress   3  mild distress  4 to 6 moderate distress  >7 severe distress  https://homecareinformation.net/handouts/hen/Respiratory_Distress_Observation_Scale.pdf (Ctrl +  left click to view)     Anxiety:                             [ ]Mild [ ]Moderate [ ]Severe  Fatigue:                             [ ]Mild [ ]Moderate [ ]Severe  Nausea:                             [ ]Mild [ ]Moderate [ ]Severe  Loss of appetite:              [ ]Mild [ ]Moderate [ ]Severe  Constipation:                    [ ]Mild [ ]Moderate [ ]Severe    PCSSQ[Palliative Care Spiritual Screening Question]   Severity (0-10):  Score of 4 or > indicate consideration of Chaplaincy referral.  Chaplaincy Referral: [ ] yes [ ] refused [ ] following [ ] Deferred     Caregiver [place]? : [ ] yes [ ] no [ ] Deferred [ ] Declined             Social work referral [ ] Patient & Family Centered Care Referral [ ]     Anticipatory Grief present?:  [ ] yes [ ] no  [ ] Deferred                  Social work referral [ ] Chaplaincy Referral[ ]      Other Symptoms:  [ ]All other review of systems negative     Palliative Performance Status Version 2:         %    http://npcrc.org/files/news/palliative_performance_scale_ppsv2.pdf  PHYSICAL EXAM:  Vital Signs Last 24 Hrs  T(C): 36.6 (27 May 2024 05:21), Max: 38.2 (26 May 2024 16:22)  T(F): 97.9 (27 May 2024 05:21), Max: 100.8 (26 May 2024 16:22)  HR: 93 (27 May 2024 05:21) (93 - 106)  BP: 123/77 (27 May 2024 05:21) (95/59 - 137/82)  BP(mean): --  RR: 22 (27 May 2024 05:21) (22 - 26)  SpO2: 91% (27 May 2024 05:21) (90% - 97%)    Parameters below as of 27 May 2024 05:21  Patient On (Oxygen Delivery Method): nasal cannula  O2 Flow (L/min): 4   I&O's Summary    GENERAL: [ ]Cachexia    [ ]Alert  [ ]Oriented x   [ ]Lethargic  [ ]Unarousable  [ ]Verbal  [ ]Non-Verbal  Behavioral:   [ ] Anxiety  [ ] Delirium [ ] Agitation [ ] Other  HEENT:  [ ]Normal   [ ]Dry mouth   [ ]ET Tube/Trach  [ ]Oral lesions  PULMONARY:   [ ]Clear [ ]Tachypnea  [ ]Audible excessive secretions   [ ]Rhonchi        [ ]Right [ ]Left [ ]Bilateral  [ ]Crackles        [ ]Right [ ]Left [ ]Bilateral  [ ]Wheezing     [ ]Right [ ]Left [ ]Bilateral  [ ]Diminished breath sounds [ ]right [ ]left [ ]bilateral  CARDIOVASCULAR:    [ ]Regular [ ]Irregular [ ]Tachy  [ ]Dexter [ ]Murmur [ ]Other  GASTROINTESTINAL:  [ ]Soft  [ ]Distended   [ ]+BS  [ ]Non tender [ ]Tender  [ ]Other [ ]PEG [ ]OGT/ NGT  Last BM:  GENITOURINARY:  [ ]Normal [ ] Incontinent   [ ]Oliguria/Anuria   [ ]Gonzáles  MUSCULOSKELETAL:   [ ]Normal   [ ]Weakness  [ ]Bed/Wheelchair bound [ ]Edema  NEUROLOGIC:   [ ]No focal deficits  [ ]Cognitive impairment  [ ]Dysphagia [ ]Dysarthria [ ]Paresis [ ]Other   SKIN:   [ ]Normal  [ ]Rash  [ ]Other  [ ]Pressure ulcer(s)       Present on admission [ ]y [ ]n    CRITICAL CARE:  [ ] Shock Present  [ ]Septic [ ]Cardiogenic [ ]Neurologic [ ]Hypovolemic  [ ]  Vasopressors [ ]  Inotropes   [ ]Respiratory failure present [ ]Mechanical ventilation [ ]Non-invasive ventilatory support [ ]High flow    [ ]Acute  [ ]Chronic [ ]Hypoxic  [ ]Hypercarbic [ ]Other  [ ]Other organ failure     LABS:                        10.8   15.99 )-----------( 210      ( 27 May 2024 07:27 )             39.4   05-27    150<H>  |  113<H>  |  49<H>  ----------------------------<  358<H>  4.5   |  37<H>  |  1.60<H>    Ca    9.2      27 May 2024 07:27  Mg     2.8     05-    TPro  6.0  /  Alb  2.4  /  TBili  0.5  /  DBili  x   /  AST  41<H>  /  ALT  43  /  AlkPhos  135<H>  0527  PT/INR - ( 27 May 2024 03:37 )   PT: 16.4 sec;   INR: 1.42 ratio         PTT - ( 27 May 2024 03:37 )  PTT:> 200 sec    Urinalysis Basic - ( 27 May 2024 07:27 )    Color: x / Appearance: x / SG: x / pH: x  Gluc: 358 mg/dL / Ketone: x  / Bili: x / Urobili: x   Blood: x / Protein: x / Nitrite: x   Leuk Esterase: x / RBC: x / WBC x   Sq Epi: x / Non Sq Epi: x / Bacteria: x      RADIOLOGY & ADDITIONAL STUDIES:    PROTEIN CALORIE MALNUTRITION PRESENT: [ ]mild [ ]moderate [ ]severe [ ]underweight [ ]morbid obesity  https://www.andeal.org/vault/2440/web/files/ONC/Table_Clinical%20Characteristics%20to%20Document%20Malnutrition-White%20JV%20et%20al%2020.pdf    Height (cm): 180.3 (24 @ 15:38), 180.3 (24 @ 23:25), 180.3 (23 @ :15)  Weight (kg): 99.8 (24 @ 15:38), 101 (24 @ 23:25), 102.1 (23 @ :15)  BMI (kg/m2): 30.7 (24 @ 15:38), 31.1 (24 @ 23:25), 31.4 (23 @ :15)    [ ]PPSV2 < or = to 30% [ ]significant weight loss  [ ]poor nutritional intake  [ ]anasarca[ ]Artificial Nutrition      Other REFERRALS:  [ ]Hospice  [ ]Child Life  [ ]Social Work  [ ]Case management [ ]Holistic Therapy      HPI:  85yo M PMHx AF (on Eliquis), CAD (s/p multiple stents), HTN, DM, HLD, COPD, OM (L Femur, on Cipro), recently admitted to [facility] for RVR, hospital course complicated by new L M3 CVA, PEG tube placed 5/15, subsequently discharged to [facility]. Pt now presenting from Rehab with decline in mental status per wife Sania, at bedside. History obtained entirely from wife, as pt unable to speak presently. States she initially noticed him being "nonresponsive," noncommunicative on yesterday in the rehab, with no change into today. States that before this he did have slurred speech with waxing and waning dysarthria, however was baseline alert and oriented x3. Was also baseline with minimal to no motor function of RLE, RUE, with diminished motor function of LUE, LLE, was requiring assistance in all activities. Of note, wife states she noticed patient was not on Ciprofloxacin (chronic med for OM) while in rehab, and inquired as to why. No answer as to why, however patient was restarted on Cipro in Rehab.    ED Course:  Vitals: T 100.8 F, , BP 95/59, RR 26, SpO2 93% on 2L  Given: Ofirmev x1, Rocephin x1, 1L NS Bolus x2  Pertinent Labs: WBC 10.5, Na 150, HCO3 42, BUN/Cr 50/1.8, Glu 414. Mg 2.8. Lactate 2.2 --> 1.5. HsTi 185.6. Pro-.  AB.44 / 65 / 89 / 44    Imaging:  CT C/A/P No con: Layering secretions in the lower trachea and bilateral proximal mainstem  bronchi with scattered opacification of distal branching lobar and segmental airways bilaterally and with peribronchial wall thickening in lower lobes suggesting bronchitis. No lung consolidation. No source of infection identified in the abdomen or pelvis. PEG tube in place. Scattered colonic diverticula.  CT Head No Con: 1.  Gyriform hyperdensity in the region of a left frontal parietal subacute infarct suggesting cortical laminar necrosis or hemorrhage. 2.  Small subacute high right frontal infarct. Subacute bilateral cerebellar infarcts.    EKG: Sinus Tachycardia w/PVC on personal read; Rate 105, QTc 452 (26 May 2024 21:02)    PERTINENT PM/SXH:   Diabetes Mellitus, Type II    CAD (Coronary Artery Disease)    Dyslipidemia    Asymptomatic Peripheral Vascular Disease    Osteomyelitis    COPD (chronic obstructive pulmonary disease)    Diabetes mellitus    Hypertension    PVD (peripheral vascular disease)    History of PAT (paroxysmal atrial tachycardia)    Asthma with COPD    BPH (benign prostatic hyperplasia)    Acute osteomyelitis      CABG (Coronary Artery Bypass Graft)    Compound fracture    S/P primary angioplasty with coronary stent    H/O drainage of abscess      FAMILY HISTORY:  Family history of diabetes mellitus (Sibling)    Family hx of lung cancer  brother,  age 82, used to smoke with pt      Family Hx substance abuse [ ]yes [ x]no  ITEMS NOT CHECKED ARE NOT PRESENT    SOCIAL HISTORY:   Significant other/partner[ x]  Children[x ]  Yarsanism/Spirituality:  Substance hx:  [ ]   Tobacco hx:  [ ]   Alcohol hx: [ ]   Home Opioid hx:  [ ] I-Stop Reference No:  Living Situation: [ ]Home  [ ]Long term care  [x ]Rehab [ ]Other    ADVANCE DIRECTIVES:    DNR/MOLST  [x ]  Living Will  [ ]   DECISION MAKER(s):  [ ] Health Care Proxy(s)  [ x] Surrogate(s)  [ ] Guardian           Name(s): Phone Number(s): Sania (spouse) number per EMR    BASELINE (I)ADL(s) (prior to admission):  [place]: [ ]Total  [x ] Moderate [ ]Dependent    Allergies    No Known Allergies    Intolerances    MEDICATIONS  (STANDING):  acetaminophen     Tablet .. 650 milliGRAM(s) Oral every 6 hours  albuterol    90 MICROgram(s) HFA Inhaler 2 Puff(s) Inhalation every 6 hours  aMIOdarone    Tablet 200 milliGRAM(s) Oral daily  atorvastatin 80 milliGRAM(s) Oral at bedtime  budesonide 160 MICROgram(s)/formoterol 4.5 MICROgram(s) Inhaler 2 Puff(s) Inhalation two times a day  ciprofloxacin     Tablet 500 milliGRAM(s) Oral two times a day  dextrose 10% Bolus 125 milliLiter(s) IV Bolus once  dextrose 5%. 1000 milliLiter(s) (100 mL/Hr) IV Continuous <Continuous>  dextrose 5%. 1000 milliLiter(s) (50 mL/Hr) IV Continuous <Continuous>  dextrose 50% Injectable 12.5 Gram(s) IV Push once  dextrose 50% Injectable 25 Gram(s) IV Push once  glucagon  Injectable 1 milliGRAM(s) IntraMuscular once  heparin   Injectable 8000 Unit(s) IV Push once  heparin  Infusion.  Unit(s)/Hr (18 mL/Hr) IV Continuous <Continuous>  insulin glargine Injectable (LANTUS) 30 Unit(s) SubCutaneous at bedtime  insulin lispro (ADMELOG) corrective regimen sliding scale   SubCutaneous every 6 hours  magnesium oxide 400 milliGRAM(s) Oral daily  metoprolol tartrate 25 milliGRAM(s) Oral two times a day  montelukast 10 milliGRAM(s) Oral at bedtime  tiotropium 2.5 MICROgram(s) Inhaler 2 Puff(s) Inhalation daily  zinc sulfate 220 milliGRAM(s) Oral daily    MEDICATIONS  (PRN):  dextrose Oral Gel 15 Gram(s) Oral once PRN Blood Glucose LESS THAN 70 milliGRAM(s)/deciliter  heparin   Injectable 4000 Unit(s) IV Push every 6 hours PRN For aPTT between 40 - 57  heparin   Injectable 8000 Unit(s) IV Push every 6 hours PRN For aPTT less than 40    PRESENT SYMPTOMS: [ x]Unable to self-report  [ ] CPOT [x ] PAINADs [ x] RDOS  Source if other than patient:  [ ]Family   [ ]Team     Pain: [ ]yes [ ]no  QOL impact -   Location -                    Aggravating factors -  Quality -  Radiation -  Timing-  Severity (0-10 scale):  Minimal acceptable level (0-10 scale):     CPOT:    https://www.sccm.org/getattachment/[email or web address]/Critical-Care-Pain-Observation-Tool-(CPOT)    PAIN AD Score:   http://geriatrictoolkit.[email or web address]/cog/painad.pdf (press ctrl +  left click to view)    Dyspnea:                           [ ]Mild [ ]Moderate [ ]Severe      RDOS:  0 to 2  minimal or no respiratory distress   3  mild distress  4 to 6 moderate distress  >7 severe distress  https://homecareinformation.net/handouts/hen/Respiratory_Distress_Observation_Scale.pdf (Ctrl +  left click to view)     Anxiety:                             [ ]Mild [ ]Moderate [ ]Severe  Fatigue:                             [ ]Mild [ ]Moderate [ ]Severe  Nausea:                             [ ]Mild [ ]Moderate [ ]Severe  Loss of appetite:              [ ]Mild [ ]Moderate [ ]Severe  Constipation:                    [ ]Mild [ ]Moderate [ ]Severe    PCSSQ[Palliative Care Spiritual Screening Question]   Severity (0-10):  Score of 4 or > indicate consideration of Chaplaincy referral.  Chaplaincy Referral: [ ] yes [ ] refused [ ] following [x ] Deferred     Caregiver [place]? : [ ] yes [ x] no [ ] Deferred [ ] Declined             Social work referral [ ] Patient & Family Centered Care Referral [ ]     Anticipatory Grief present?:  [ ] yes [ x] no  [ ] Deferred                  Social work referral [ ] Chaplaincy Referral[ ]      Other Symptoms:  [ ]All other review of systems negative     Palliative Performance Status Version 2:     20    %    http://npcrc.org/files/news/palliative_performance_scale_ppsv2.pdf  PHYSICAL EXAM:  Vital Signs Last 24 Hrs  T(C): 36.6 (27 May 2024 05:21), Max: 38.2 (26 May 2024 16:22)  T(F): 97.9 (27 May 2024 05:21), Max: 100.8 (26 May 2024 16:22)  HR: 93 (27 May 2024 05:21) (93 - 106)  BP: 123/77 (27 May 2024 05:21) (95/59 - 137/82)  BP(mean): --  RR: 22 (27 May 2024 05:21) (22 - 26)  SpO2: 91% (27 May 2024 05:21) (90% - 97%)    Parameters below as of 27 May 2024 05:21  Patient On (Oxygen Delivery Method): nasal cannula  O2 Flow (L/min): 4   I&O's Summary    GENERAL: [ ]Cachexia    [ ]Alert  [ ]Oriented x   [ x]Lethargic  [ ]Unarousable  [ ]Verbal  [ ]Non-Verbal  Behavioral:   [ ] Anxiety  [ ] Delirium [ ] Agitation [ ] Other  HEENT:  [ ]Normal   [ ]Dry mouth   [ ]ET Tube/Trach  [ ]Oral lesions  PULMONARY:   [x ]Clear [ ]Tachypnea  [ ]Audible excessive secretions   [ ]Rhonchi        [ ]Right [ ]Left [ ]Bilateral  [ ]Crackles        [ ]Right [ ]Left [ ]Bilateral  [ ]Wheezing     [ ]Right [ ]Left [ ]Bilateral  [ ]Diminished breath sounds [ ]right [ ]left [ ]bilateral  CARDIOVASCULAR:    [ x]Regular [ ]Irregular [ ]Tachy  [ ]Dexter [ ]Murmur [ ]Other  GASTROINTESTINAL:  [x ]Soft  [ ]Distended   [x ]+BS  [ ]Non tender [ ]Tender  [ ]Other [ x]PEG [ ]OGT/ NGT  Last BM:  GENITOURINARY:  [ ]Normal [x ] Incontinent   [ ]Oliguria/Anuria   [ ]Gonzáles  MUSCULOSKELETAL:   [ ]Normal   [x ]Weakness  [ x]Bed/Wheelchair bound [ ]Edema  NEUROLOGIC:   [ ]No focal deficits  [x ]Cognitive impairment  [ ]Dysphagia [ ]Dysarthria [x ]Paresis [ ]Other   SKIN: left thigh scar with fluctuance  [ ]Normal  [ ]Rash  [x ]Other  [ ]Pressure ulcer(s)       Present on admission [ ]y [ ]n    CRITICAL CARE:  [ ] Shock Present  [ ]Septic [ ]Cardiogenic [ ]Neurologic [ ]Hypovolemic  [ ]  Vasopressors [ ]  Inotropes   [ ]Respiratory failure present [ ]Mechanical ventilation [ ]Non-invasive ventilatory support [ ]High flow    [ ]Acute  [ ]Chronic [ ]Hypoxic  [ ]Hypercarbic [ ]Other  [ ]Other organ failure     LABS:                        10.8   15.99 )-----------( 210      ( 27 May 2024 07:27 )             39.4   05-27    150<H>  |  113<H>  |  49<H>  ----------------------------<  358<H>  4.5   |  37<H>  |  1.60<H>    Ca    9.2      27 May 2024 07:27  Mg     2.8     05-26    TPro  6.0  /  Alb  2.4  /  TBili  0.5  /  DBili  x   /  AST  41<H>  /  ALT  43  /  AlkPhos  135<H>  05-27  PT/INR - ( 27 May 2024 03:37 )   PT: 16.4 sec;   INR: 1.42 ratio         PTT - ( 27 May 2024 03:37 )  PTT:> 200 sec    Urinalysis Basic - ( 27 May 2024 07:27 )    Color: x / Appearance: x / SG: x / pH: x  Gluc: 358 mg/dL / Ketone: x  / Bili: x / Urobili: x   Blood: x / Protein: x / Nitrite: x   Leuk Esterase: x / RBC: x / WBC x   Sq Epi: x / Non Sq Epi: x / Bacteria: x      RADIOLOGY & ADDITIONAL STUDIES: MRI pending    < from: CT Head No Cont (24 @ 12:18) >    ACC: [id number] EXAM:  CT BRAIN   ORDERED BY:  FREDI GARRISON     PROCEDURE DATE:  2024          INTERPRETATION:  Clinical indication: Recent CVA.    Multiple axial sections were performed from the base of skull to vertex   without contrast enhancement. Coronal and sagittal reconstructions were   performed    This exam compared prior head CT performed on May 26, 2024 and May 9,   2024.    Parenchymal volume loss and chronic microvessel ischemic changes again   seen    Abnormal low-attenuation involving the left posterior frontal/parietal   cortical subcortical region is again seen and unchanged. This is likely   compatible with a subacute infarct. Associated area of high attenuation   seen which is suspicious for area of hemorrhagic transformation. This   finding can be further evaluated with MRI if clinically indicated after   normal contrast. Localized mass effect is seen consisting of sulcal   effacement. No significant shift or herniation seen    Extra-axial lesion involving the high left parietal region is again seen   measures approximately 1.3 x 2.2 cm. This demonstrates peripheral   calcification and is likely compatible with a meningioma.    Elevation of the osseous structures the appropriate window appears   unremarkable    The visualized paranasal sinuses mastoid and middle ear regions appear   clear.    Impression: Stable exam.    --- End of Report ---            VIRAJ MAGAÑA MD; Attending Radiologist  This document has been electronically signed. May 27 2024  1:45PM    < end of copied text >      PROTEIN CALORIE MALNUTRITION PRESENT: [ ]mild [ ]moderate [ ]severe [ ]underweight [ ]morbid obesity  https://www.andeal.org/vault/2440/web/files/ONC/Table_Clinical%20Characteristics%20to%20Document%20Malnutrition-White%20JV%20et%20al%2020.pdf    Height (cm): 180.3 (24 @ 15:38), 180.3 (24 @ 23:25), 180.3 (23 @ :15)  Weight (kg): 99.8 (24 @ 15:38), 101 (24 @ 23:25), 102.1 (23 @ 14:15)  BMI (kg/m2): 30.7 (24 @ 15:38), 31.1 (24 @ 23:25), 31.4 (23 @ 14:15)    [x ]PPSV2 < or = to 30% [ ]significant weight loss  [ x]poor nutritional intake  [ ]anasarca[ x]Artificial Nutrition      Other REFERRALS:  [ ]Hospice  [ ]Child Life  [ x]Social Work  [ ]Case management [ ]Holistic Therapy

## 2024-05-27 NOTE — DIETITIAN INITIAL EVALUATION ADULT - PROBLEM SELECTOR PLAN 3
- MERRILL with BUN 50, Cr 1.8. Baseline 1.3.    #Hypernatremia  - Corrected Na for blood glucose 155  - C/w 1/2 NS @ 100 cc/hr  - F/u urine NA, osmolality  - F/u AM BMP  - Nephro Dr. Garcia consulted    #Contraction Alkalosis  - Monitor respiratory status closely  - Continuous Pulse ox  - Fluid resuscitation as above  - Follow AM Labs

## 2024-05-27 NOTE — OCCUPATIONAL THERAPY INITIAL EVALUATION ADULT - NAME OF CLINICIAN
Lani (RN) made aware of pt status. SW made aware of recommendation to return to [facility].

## 2024-05-27 NOTE — PATIENT CHOICE NOTE. - NSPTCHOICENOTES_GEN_ALL_CORE
Pt admitted from [facility]; left voicemail for pt cornelius Lui to review if she wants pt to be dc back there when medically stable.

## 2024-05-28 NOTE — PROGRESS NOTE ADULT - PROBLEM SELECTOR PLAN 1
- Severe sepsis present on admission with T 100.8, , RR 26, Lactate 2.2 on admission  -CT AP: Layering secretions in the lower trachea and bilateral proximal mainstem bronchi with scattered opacification of distal branching lobar and segmental airways bilaterally and with peribronchial wall thickening in lower lobes suggesting bronchitis. No lung consolidation. No source of infection identified in the abdomen or pelvis.  - UCx, BCx both NGTD  -Pts WBC remains elevated but has been afebrile throughout admission.  - Possible source from L Femur OM, as patient's cipro was held at Rehab since prior discharge for unknown reason  -Other possible source is aspiration PNA as pt with layering srecretions in the trachei/BL mainsteam bronchi with other CT findings as above  - ID Dr. العلي consulted, started on zosyn

## 2024-05-28 NOTE — DISCHARGE NOTE PROVIDER - YES NO FOR MLM POSITIVE OR NEGATIVE COVID RESULT
Appt scheduled  
Patient has left ear pain for 3 days, clogged. Patient is asking for a visit any time this week. Please advice. Thank you.  
,

## 2024-05-28 NOTE — PROGRESS NOTE ADULT - PROBLEM SELECTOR PLAN 4
remain available as needed.  Francia Banerjee MD, [facility]; Palliative Care Attending, Ethicist. [phone]

## 2024-05-28 NOTE — PROGRESS NOTE ADULT - ATTENDING COMMENTS
patient seen and examined at bedside with residents , agree with resident assessment and plan except the followin. sepsis: likely aspiration PNA, ?L femur OM: on zosyn, ID eval noted , blood urine culture NTD , will follow up   2. AMS: discussed with neuro , initial CT negative for acute stroke, MR pending patient seen and examined at bedside with residents , agree with resident assessment and plan except the followin. sepsis: likely aspiration PNA, ?L femur OM: on zosyn, ID eval noted , blood urine culture NTD , will follow up   2. AMS, recent CVA : initial CT negative for acute stroke, MR: New posterior left corona radiata/frontal lobe infarct.    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 Petechial hemorrhage in the left parietal lobe. Bilateral areas of recent microhemorrhages.   will hold off heparin drip, on statin, follow up with neuro   3. type II DM , uncontrolled: will give stat dose lantus 10 now with latus 30units standing at night,  RISS with tube feeding schedule. FS goal 100-180.   4.  A fib: amiodarone, metoprolol.  rate controlled, off heparin drip due to concern of micro bleed on MRI  patient is more awake today     discussed with wife at bedside. patient seen and examined at bedside with residents , agree with resident assessment and plan except the followin. sepsis: likely aspiration PNA, ?L femur OM: on zosyn, ID eval noted , blood urine culture NTD , will follow up   2. AMS, recent CVA : initial CT negative for acute stroke, MR: New posterior left corona radiata/frontal lobe infarct.    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 Petechial hemorrhage in the left parietal lobe. Bilateral areas of recent microhemorrhages.   will hold off heparin drip, on statin, follow up with neuro   3. type II DM , uncontrolled: will give stat dose lantus 10 now with latus 30units standing at night,  RISS with tube feeding schedule. FS goal 100-180.   4.  A fib: amiodarone, metoprolol.  rate controlled, off heparin drip due to concern of micro bleed on MRI  5. hypernatremia: free water in peg, started IV D5w   patient is more awake today     discussed with wife at bedside.

## 2024-05-28 NOTE — PROGRESS NOTE ADULT - PROBLEM SELECTOR PLAN 4
- Hx T2DM with hyperglycemia > 400   -increased  Lantus  QHS, with q6h MISS and 3unit standing , will change to TID start from tonmorrow due to change of feeding schedule.   - F/u FS and adjust accordingly  - Hypoglycemia protocol  - On PEG Tube feeding - Hx T2DM with hyperglycemia > 400   -Lantus 30 u subq at bedtime standing  -Sliding scale to reflect times feeds  -Ordered 10 units STAT as pt continues  to by hyperglycemic on D5W drip and increased   - Hypoglycemia protocol  - On PEG Tube feeding

## 2024-05-28 NOTE — PROGRESS NOTE ADULT - PROBLEM SELECTOR PLAN 1
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

## 2024-05-28 NOTE — PROGRESS NOTE ADULT - PROBLEM SELECTOR PLAN 2
- New bilateral CVA's, hx Left M3 infarct  - CT Head No Con: 1.  Gyriform hyperdensity in the region of a left frontal parietal subacute infarct suggesting cortical laminar necrosis or hemorrhage. 2.  Small subacute high right frontal infarct. Subacute bilateral cerebellar infarcts.  - Out of window for thrombolytic therapy  - Heparin gtt  - repeat CT stable.   - Neuro check q4h  - Dr. Underwood Neurology consulted  - C/w high-dose statin  - MR head pending

## 2024-05-28 NOTE — PROGRESS NOTE ADULT - SUBJECTIVE AND OBJECTIVE BOX
Indication for Geriatrics and Palliative Care Services/INTERVAL HPI:  SUBJECTIVE AND OBJECTIVE:    OVERNIGHT EVENTS:    DNR on chart:DNI  DNI      Allergies    No Known Allergies    Intolerances    MEDICATIONS  (STANDING):  acetaminophen     Tablet .. 650 milliGRAM(s) Oral every 6 hours  albuterol    90 MICROgram(s) HFA Inhaler 2 Puff(s) Inhalation every 6 hours  aMIOdarone    Tablet 200 milliGRAM(s) Oral daily  atorvastatin 80 milliGRAM(s) Oral at bedtime  budesonide 160 MICROgram(s)/formoterol 4.5 MICROgram(s) Inhaler 2 Puff(s) Inhalation two times a day  dextrose 10% Bolus 125 milliLiter(s) IV Bolus once  dextrose 5%. 1000 milliLiter(s) (100 mL/Hr) IV Continuous <Continuous>  dextrose 5%. 1000 milliLiter(s) (75 mL/Hr) IV Continuous <Continuous>  dextrose 5%. 1000 milliLiter(s) (50 mL/Hr) IV Continuous <Continuous>  dextrose 50% Injectable 12.5 Gram(s) IV Push once  dextrose 50% Injectable 25 Gram(s) IV Push once  glucagon  Injectable 1 milliGRAM(s) IntraMuscular once  insulin glargine Injectable (LANTUS) 30 Unit(s) SubCutaneous at bedtime  insulin lispro (ADMELOG) corrective regimen sliding scale   SubCutaneous <User Schedule>  magnesium oxide 400 milliGRAM(s) Oral daily  metoprolol tartrate 25 milliGRAM(s) Oral two times a day  montelukast 10 milliGRAM(s) Oral at bedtime  piperacillin/tazobactam IVPB.. 3.375 Gram(s) IV Intermittent every 8 hours  tiotropium 2.5 MICROgram(s) Inhaler 2 Puff(s) Inhalation daily  zinc sulfate 220 milliGRAM(s) Oral daily    MEDICATIONS  (PRN):  dextrose Oral Gel 15 Gram(s) Oral once PRN Blood Glucose LESS THAN 70 milliGRAM(s)/deciliter      ITEMS UNCHECKED ARE NOT PRESENT    PRESENT SYMPTOMS: [ ]Unable to self-report - see [ ] CPOT [ ] PAINADS [ ] RDOS  Source if other than patient:  [ ]Family   [ ]Team     Pain:  [ ]yes [ ]no  QOL impact -   Location -                    Aggravating factors -  Quality -  Radiation -  Timing-  Severity (0-10 scale):  Minimal acceptable level (0-10 scale):     CPOT:    https://www.sccm.org/getattachment/bmd20f41-7o8k-2i0q-2p2n-0271e3727z5w/Critical-Care-Pain-Observation-Tool-(CPOT)    Dyspnea:                           [ ]Mild [ ]Moderate [ ]Severe  Anxiety:                             [ ]Mild [ ]Moderate [ ]Severe  Fatigue:                             [ ]Mild [ ]Moderate [ ]Severe  Nausea:                             [ ]Mild [ ]Moderate [ ]Severe  Loss of appetite:              [ ]Mild [ ]Moderate [ ]Severe  Constipation:                    [ ]Mild [ ]Moderate [ ]Severe    PCSSQ[Palliative Care Spiritual Screening Question]   Severity (0-10):  Score of 4 or > indicate consideration of Chaplaincy referral.  Chaplaincy Referral: [ ] yes [ ] refused [ ] following [ ] Deferred     Caregiver [place]? : [ ] yes [ ] no [ ] Deferred [ ] Declined             Social work referral [ ] Patient & Family Centered Care Referral [ ]     Anticipatory Grief present?:  [ ] yes [ ] no  [ ] Deferred                  Social work referral [ ] Chaplaincy Referral[ ]      Other Symptoms:  [ ]All other review of systems negative   [ ]Unable to obtain due to poor mentation    Palliative Performance Status Version 2:         %      http://npcrc.org/files/news/palliative_performance_scale_ppsv2.pdf  PHYSICAL EXAM:  Vital Signs Last 24 Hrs  T(C): 36.6 (28 May 2024 13:10), Max: 36.9 (27 May 2024 20:42)  T(F): 97.9 (28 May 2024 13:10), Max: 98.4 (27 May 2024 20:42)  HR: 80 (28 May 2024 13:10) (80 - 108)  BP: 129/68 (28 May 2024 13:10) (96/62 - 129/68)  BP(mean): --  RR: 20 (28 May 2024 13:10) (17 - 20)  SpO2: 99% (28 May 2024 13:10) (94% - 99%)    Parameters below as of 28 May 2024 13:10  Patient On (Oxygen Delivery Method): nasal cannula  O2 Flow (L/min): 4   I&O's Summary    27 May 2024 07:01  -  28 May 2024 07:00  --------------------------------------------------------  IN: 0 mL / OUT: 1700 mL / NET: -1700 mL       GENERAL: [ ]Cachexia    [ ]Alert  [ ]Oriented x   [ ]Lethargic  [ ]Unarousable  [ ]Verbal  [ ]Non-Verbal  Behavioral:   [ ]Anxiety  [ ]Delirium [ ]Agitation [ ]Other  HEENT:  [ ]Normal   [ ]Dry mouth   [ ]ET Tube/Trach  [ ]Oral lesions  PULMONARY:   [ ]Clear [ ]Tachypnea  [ ]Audible excessive secretions   [ ]Rhonchi        [ ]Right [ ]Left [ ]Bilateral  [ ]Crackles        [ ]Right [ ]Left [ ]Bilateral  [ ]Wheezing     [ ]Right [ ]Left [ ]Bilateral  [ ]Diminished BS [ ] Right [ ]Left [ ]Bilateral  CARDIOVASCULAR:    [ ]Regular [ ]Irregular [ ]Tachy  [ ]Dexter [ ]Murmur [ ]Other  GASTROINTESTINAL:  [ ]Soft  [ ]Distended   [ ]+BS  [ ]Non tender [ ]Tender  [ ]Other [ ]PEG [ ]OGT/ NGT   Last BM:   GENITOURINARY:  [ ]Normal [ ]Incontinent   [ ]Oliguria/Anuria   [ ]Gonzáles  MUSCULOSKELETAL:   [ ]Normal   [ ]Weakness  [ ]Bed/Wheelchair bound [ ]Edema  NEUROLOGIC:   [ ]No focal deficits  [ ] Cognitive impairment  [ ] Dysphagia [ ]Dysarthria [ ] Paresis [ ]Other   SKIN:   [ ]Normal  [ ]Rash  [ ]Other  [ ]Pressure ulcer(s) [ ]y [ ]n present on admission    CRITICAL CARE:  [ ]Shock Present  [ ]Septic [ ]Cardiogenic [ ]Neurologic [ ]Hypovolemic  [ ]Vasopressors [ ]Inotropes  [ ]Respiratory failure present [ ]Mechanical Ventilation [ ]Non-invasive ventilatory support [ ]High-Flow   [ ]Acute  [ ]Chronic [ ]Hypoxic  [ ]Hypercarbic [ ]Other  [ ]Other organ failure     LABS:                        9.7    15.71 )-----------( 195      ( 28 May 2024 06:16 )             34.6   05-28    155<H>  |  116<H>  |  55<H>  ----------------------------<  282<H>  4.2   |  41<H>  |  1.80<H>    Ca    9.3      28 May 2024 06:16  Mg     2.8     05-26    TPro  5.8<L>  /  Alb  2.1<L>  /  TBili  0.4  /  DBili  x   /  AST  35  /  ALT  45  /  AlkPhos  130<H>  05-28  PT/INR - ( 27 May 2024 03:37 )   PT: 16.4 sec;   INR: 1.42 ratio         PTT - ( 28 May 2024 06:16 )  PTT:71.7 sec    Urinalysis Basic - ( 28 May 2024 06:16 )    Color: x / Appearance: x / SG: x / pH: x  Gluc: 282 mg/dL / Ketone: x  / Bili: x / Urobili: x   Blood: x / Protein: x / Nitrite: x   Leuk Esterase: x / RBC: x / WBC x   Sq Epi: x / Non Sq Epi: x / Bacteria: x      RADIOLOGY & ADDITIONAL STUDIES:    Protein Calorie Malnutrition Present: [ ]mild [ ]moderate [ ]severe [ ]underweight [ ]morbid obesity  https://www.andeal.org/vault/2440/web/files/ONC/Table_Clinical%20Characteristics%20to%20Document%20Malnutrition-White%20JV%20et%20al%202012.pdf    Height (cm): 180.3 (05-26-24 @ 15:38), 180.3 (04-27-24 @ 23:25), 180.3 (11-14-23 @ 14:15)  Weight (kg): 99.8 (05-26-24 @ 15:38), 101 (04-27-24 @ 23:25), 102.1 (11-14-23 @ 14:15)  BMI (kg/m2): 30.7 (05-26-24 @ 15:38), 31.1 (04-27-24 @ 23:25), 31.4 (11-14-23 @ 14:15)    [ ]PPSV2 < or = 30%  [ ]significant weight loss [ ]poor nutritional intake [ ]anasarca[ ]Artificial Nutrition    Other REFERRALS:  [ ]Hospice  [ ]Child Life  [ ]Social Work  [ ]Case management [ ]Holistic Therapy          Indication for Geriatrics and Palliative Care Services/INTERVAL HPI: goals of care  SUBJECTIVE AND OBJECTIVE: Patient seen and examined, more awake today but still not at baseline. CT reviewed stable.    OVERNIGHT EVENTS: no acute overnight events    DNR on chart:DNI  DNI      Allergies    No Known Allergies    Intolerances    MEDICATIONS  (STANDING):  acetaminophen     Tablet .. 650 milliGRAM(s) Oral every 6 hours  albuterol    90 MICROgram(s) HFA Inhaler 2 Puff(s) Inhalation every 6 hours  aMIOdarone    Tablet 200 milliGRAM(s) Oral daily  atorvastatin 80 milliGRAM(s) Oral at bedtime  budesonide 160 MICROgram(s)/formoterol 4.5 MICROgram(s) Inhaler 2 Puff(s) Inhalation two times a day  dextrose 10% Bolus 125 milliLiter(s) IV Bolus once  dextrose 5%. 1000 milliLiter(s) (100 mL/Hr) IV Continuous <Continuous>  dextrose 5%. 1000 milliLiter(s) (75 mL/Hr) IV Continuous <Continuous>  dextrose 5%. 1000 milliLiter(s) (50 mL/Hr) IV Continuous <Continuous>  dextrose 50% Injectable 12.5 Gram(s) IV Push once  dextrose 50% Injectable 25 Gram(s) IV Push once  glucagon  Injectable 1 milliGRAM(s) IntraMuscular once  insulin glargine Injectable (LANTUS) 30 Unit(s) SubCutaneous at bedtime  insulin lispro (ADMELOG) corrective regimen sliding scale   SubCutaneous <User Schedule>  magnesium oxide 400 milliGRAM(s) Oral daily  metoprolol tartrate 25 milliGRAM(s) Oral two times a day  montelukast 10 milliGRAM(s) Oral at bedtime  piperacillin/tazobactam IVPB.. 3.375 Gram(s) IV Intermittent every 8 hours  tiotropium 2.5 MICROgram(s) Inhaler 2 Puff(s) Inhalation daily  zinc sulfate 220 milliGRAM(s) Oral daily    MEDICATIONS  (PRN):  dextrose Oral Gel 15 Gram(s) Oral once PRN Blood Glucose LESS THAN 70 milliGRAM(s)/deciliter      ITEMS UNCHECKED ARE NOT PRESENT    PRESENT SYMPTOMS: [ x]Unable to self-report - see [ ] CPOT [x ] PAINADS [ x] RDOS  Source if other than patient:  [ ]Family   [ ]Team     Pain:  [ ]yes [ ]no  QOL impact -   Location -                    Aggravating factors -  Quality -  Radiation -  Timing-  Severity (0-10 scale):  Minimal acceptable level (0-10 scale):     CPOT:    https://www.[facility].org/getattachment/yel58l53-1o1a-6q6v-9e5y-1181a8545l8b/Critical-Care-Pain-Observation-Tool-(CPOT)    Dyspnea:                           [ ]Mild [ ]Moderate [ ]Severe  Anxiety:                             [ ]Mild [ ]Moderate [ ]Severe  Fatigue:                             [ ]Mild [ ]Moderate [ ]Severe  Nausea:                             [ ]Mild [ ]Moderate [ ]Severe  Loss of appetite:              [ ]Mild [ ]Moderate [ ]Severe  Constipation:                    [ ]Mild [ ]Moderate [ ]Severe    PCSSQ[Palliative Care Spiritual Screening Question]   Severity (0-10):  Score of 4 or > indicate consideration of Chaplaincy referral.  Chaplaincy Referral: [ ] yes [ ] refused [ ] following [x ] Deferred     Caregiver [place]? : [ ] yes [x ] no [ ] Deferred [ ] Declined             Social work referral [ ] Patient & Family Centered Care Referral [ ]     Anticipatory Grief present?:  [ ] yes [ x] no  [ ] Deferred                  Social work referral [ ] Chaplaincy Referral[ ]      Other Symptoms:  [ ]All other review of systems negative   [x ]Unable to obtain due to poor mentation    Palliative Performance Status Version 2:      20   %      http://npcrc.org/files/news/palliative_performance_scale_ppsv2.pdf  PHYSICAL EXAM:  Vital Signs Last 24 Hrs  T(C): 36.6 (28 May 2024 13:10), Max: 36.9 (27 May 2024 20:42)  T(F): 97.9 (28 May 2024 13:10), Max: 98.4 (27 May 2024 20:42)  HR: 80 (28 May 2024 13:10) (80 - 108)  BP: 129/68 (28 May 2024 13:10) (96/62 - 129/68)  BP(mean): --  RR: 20 (28 May 2024 13:10) (17 - 20)  SpO2: 99% (28 May 2024 13:10) (94% - 99%)    Parameters below as of 28 May 2024 13:10  Patient On (Oxygen Delivery Method): nasal cannula  O2 Flow (L/min): 4   I&O's Summary    27 May 2024 07:01  -  28 May 2024 07:00  --------------------------------------------------------  IN: 0 mL / OUT: 1700 mL / NET: -1700 mL       GENERAL: [ ]Cachexia    [ ]Alert  [ ]Oriented x   [ x]Lethargic  [ ]Unarousable  [ ]Verbal  [ ]Non-Verbal  Behavioral:   [ ]Anxiety  [ ]Delirium [ ]Agitation [ ]Other  HEENT:  [ ]Normal   [ x]Dry mouth   [ ]ET Tube/Trach  [ ]Oral lesions  PULMONARY:   [x ]Clear [ ]Tachypnea  [ ]Audible excessive secretions   [ ]Rhonchi        [ ]Right [ ]Left [ ]Bilateral  [ ]Crackles        [ ]Right [ ]Left [ ]Bilateral  [ ]Wheezing     [ ]Right [ ]Left [ ]Bilateral  [ ]Diminished BS [ ] Right [ ]Left [ ]Bilateral  CARDIOVASCULAR:    [x ]Regular [ ]Irregular [ ]Tachy  [ ]Dexter [ ]Murmur [ ]Other  GASTROINTESTINAL:  [x ]Soft  [ ]Distended   [ ]+BS  [ ]Non tender [ ]Tender  [ ]Other [x ]PEG [ ]OGT/ NGT   Last BM:   GENITOURINARY:  [ ]Normal [x ]Incontinent   [ ]Oliguria/Anuria   [ ]Gonzáles  MUSCULOSKELETAL:   [ ]Normal   [ x]Weakness  [x ]Bed/Wheelchair bound [ ]Edema  NEUROLOGIC:   [ ]No focal deficits  [ x] Cognitive impairment  [x ] Dysphagia [ ]Dysarthria [ ] Paresis [ ]Other   SKIN:   [ ]Normal  [ ]Rash  [ ]Other  [ ]Pressure ulcer(s) [ ]y [ ]n present on admission    CRITICAL CARE:  [ ]Shock Present  [ ]Septic [ ]Cardiogenic [ ]Neurologic [ ]Hypovolemic  [ ]Vasopressors [ ]Inotropes  [ ]Respiratory failure present [ ]Mechanical Ventilation [ ]Non-invasive ventilatory support [ ]High-Flow   [ ]Acute  [ ]Chronic [ ]Hypoxic  [ ]Hypercarbic [ ]Other  [ ]Other organ failure     LABS:                        9.7    15.71 )-----------( 195      ( 28 May 2024 06:16 )             34.6   05-28    155<H>  |  116<H>  |  55<H>  ----------------------------<  282<H>  4.2   |  41<H>  |  1.80<H>    Ca    9.3      28 May 2024 06:16  Mg     2.8     05-26    TPro  5.8<L>  /  Alb  2.1<L>  /  TBili  0.4  /  DBili  x   /  AST  35  /  ALT  45  /  AlkPhos  130<H>  05-28  PT/INR - ( 27 May 2024 03:37 )   PT: 16.4 sec;   INR: 1.42 ratio         PTT - ( 28 May 2024 06:16 )  PTT:71.7 sec    Urinalysis Basic - ( 28 May 2024 06:16 )    Color: x / Appearance: x / SG: x / pH: x  Gluc: 282 mg/dL / Ketone: x  / Bili: x / Urobili: x   Blood: x / Protein: x / Nitrite: x   Leuk Esterase: x / RBC: x / WBC x   Sq Epi: x / Non Sq Epi: x / Bacteria: x      RADIOLOGY & ADDITIONAL STUDIES:  < from: CT Head No Cont (05.27.24 @ 12:18) >    ACC: [id number] EXAM:  CT BRAIN   ORDERED BY:  FREDI GARRISON     PROCEDURE DATE:  05/27/2024          INTERPRETATION:  Clinical indication: Recent CVA.    Multiple axial sections were performed from the base of skull to vertex   without contrast enhancement. Coronal and sagittal reconstructions were   performed    This exam compared prior head CT performed on May 26, 2024 and May 9,   2024.    Parenchymal volume loss and chronic microvessel ischemic changes again   seen    Abnormal low-attenuation involving the left posterior frontal/parietal   cortical subcortical region is again seen and unchanged. This is likely   compatible with a subacute infarct. Associated area of high attenuation   seen which is suspicious for area of hemorrhagic transformation. This   finding can be further evaluated with MRI if clinically indicated after   normal contrast. Localized mass effect is seen consisting of sulcal   effacement. No significant shift or herniation seen    Extra-axial lesion involving the high left parietal region is again seen   measures approximately 1.3 x 2.2 cm. This demonstrates peripheral   calcification and is likely compatible with a meningioma.    Elevation of the osseous structures the appropriate window appears   unremarkable    The visualized paranasal sinuses mastoid and middle ear regions appear   clear.    Impression: Stable exam.    --- End of Report ---            VIRAJ MAGAÑA MD; Attending Radiologist  This document has been electronically signed. May 27 2024  1:45PM    < end of copied text >      Protein Calorie Malnutrition Present: [ ]mild [ ]moderate [ ]severe [ ]underweight [ ]morbid obesity  https://www.andeal.org/vault/2440/web/files/ONC/Table_Clinical%20Characteristics%20to%20Document%20Malnutrition-White%20JV%20et%20al%202012.pdf    Height (cm): 180.3 (05-26-24 @ 15:38), 180.3 (04-27-24 @ 23:25), 180.3 (11-14-23 @ 14:15)  Weight (kg): 99.8 (05-26-24 @ 15:38), 101 (04-27-24 @ 23:25), 102.1 (11-14-23 @ 14:15)  BMI (kg/m2): 30.7 (05-26-24 @ 15:38), 31.1 (04-27-24 @ 23:25), 31.4 (11-14-23 @ 14:15)    [ x]PPSV2 < or = 30%  [ ]significant weight loss [ x]poor nutritional intake [ ]anasarca[x ]Artificial Nutrition    Other REFERRALS:  [ ]Hospice  [ ]Child Life  [x ]Social Work  [ ]Case management [ ]Holistic Therapy

## 2024-05-28 NOTE — PROGRESS NOTE ADULT - PROBLEM SELECTOR PLAN 3
Cr baseline appears to be 1.3 per chart review   -Likely pre-renal 2/2 dec PO intake  -Pt remains on d5 IVF   -add free water to peg. Cr baseline appears to be 1.3 per chart review   -Likely pre-renal 2/2 dec PO intake  -Pt remains on d5 IVF   -on tube feeds with free water

## 2024-05-28 NOTE — SWALLOW BEDSIDE ASSESSMENT ADULT - SWALLOW EVAL: DIAGNOSIS
Patient noted with baseline congested cough and wet/gurgly vocal quality. RN informed and arrived at bedside to assess patient. O2 saturation ~98%. Per RN, current presentation consistent with baseline. Unable to adequately assess for pooling of secretions in oral cavity given patient not following directives for oral mechanism examination. PO trials deferred at this time given patient presenting with wet/gurgly vocal quality and baseline congested cough indicative of poor secretion management placing patient at in increased risk of aspiration.

## 2024-05-28 NOTE — PROGRESS NOTE ADULT - SUBJECTIVE AND OBJECTIVE BOX
Patient is a 84y old  Male who presents with a chief complaint of AMS, CVA (27 May 2024 13:50)      INTERVAL HPI/OVERNIGHT EVENTS: Pt seen and examined at bedside. No overnight events. Patient is minimally responsive to sternal rub and verbal stimuli.     MEDICATIONS  (STANDING):  acetaminophen     Tablet .. 650 milliGRAM(s) Oral every 6 hours  albuterol    90 MICROgram(s) HFA Inhaler 2 Puff(s) Inhalation every 6 hours  aMIOdarone    Tablet 200 milliGRAM(s) Oral daily  atorvastatin 80 milliGRAM(s) Oral at bedtime  budesonide 160 MICROgram(s)/formoterol 4.5 MICROgram(s) Inhaler 2 Puff(s) Inhalation two times a day  dextrose 10% Bolus 125 milliLiter(s) IV Bolus once  dextrose 5%. 1000 milliLiter(s) (100 mL/Hr) IV Continuous <Continuous>  dextrose 5%. 1000 milliLiter(s) (50 mL/Hr) IV Continuous <Continuous>  dextrose 5%. 1000 milliLiter(s) (75 mL/Hr) IV Continuous <Continuous>  dextrose 50% Injectable 12.5 Gram(s) IV Push once  dextrose 50% Injectable 25 Gram(s) IV Push once  glucagon  Injectable 1 milliGRAM(s) IntraMuscular once  heparin   Injectable 8000 Unit(s) IV Push once  heparin  Infusion.  Unit(s)/Hr (18 mL/Hr) IV Continuous <Continuous>  insulin glargine Injectable (LANTUS) 10 Unit(s) SubCutaneous once  insulin glargine Injectable (LANTUS) 30 Unit(s) SubCutaneous at bedtime  insulin lispro (ADMELOG) corrective regimen sliding scale   SubCutaneous <User Schedule>  magnesium oxide 400 milliGRAM(s) Oral daily  metoprolol tartrate 25 milliGRAM(s) Oral two times a day  montelukast 10 milliGRAM(s) Oral at bedtime  piperacillin/tazobactam IVPB.. 3.375 Gram(s) IV Intermittent every 8 hours  tiotropium 2.5 MICROgram(s) Inhaler 2 Puff(s) Inhalation daily  zinc sulfate 220 milliGRAM(s) Oral daily    MEDICATIONS  (PRN):  dextrose Oral Gel 15 Gram(s) Oral once PRN Blood Glucose LESS THAN 70 milliGRAM(s)/deciliter  heparin   Injectable 4000 Unit(s) IV Push every 6 hours PRN For aPTT between 40 - 57  heparin   Injectable 8000 Unit(s) IV Push every 6 hours PRN For aPTT less than 40      Allergies    No Known Allergies    Intolerances        REVIEW OF SYSTEMS:  unable to assess 2/2 ams      Vital Signs Last 24 Hrs  T(C): 36.3 (28 May 2024 05:00), Max: 36.9 (27 May 2024 20:42)  T(F): 97.4 (28 May 2024 05:00), Max: 98.4 (27 May 2024 20:42)  HR: 108 (28 May 2024 05:00) (92 - 108)  BP: 104/65 (28 May 2024 05:00) (96/62 - 115/73)  BP(mean): --  RR: 17 (28 May 2024 05:00) (17 - 18)  SpO2: 95% (28 May 2024 05:00) (94% - 98%)    Parameters below as of 28 May 2024 05:00  Patient On (Oxygen Delivery Method): nasal cannula  O2 Flow (L/min): 4      PHYSICAL EXAM:  GENERAL: ill-appearing, elderly male, minimally responsive  HEENT:  NC/AT, unable to assess EOM, anicteric, dry mucous membranes  CHEST/LUNG:  Coarse breath sounds bilaterally  HEART:  RRR, S1, S2  ABDOMEN:  BS+, soft, nontender, nondistended. PEG tube c/d/i  MUSCULOSKELETAL: no edema, cyanosis, or calf tenderness  NEUROLOGIC: AAOX0. Does not respond to commands for strength assessment.       LABS:                        9.7    15.71 )-----------( 195      ( 28 May 2024 06:16 )             34.6     CBC Full  -  ( 28 May 2024 06:16 )  WBC Count : 15.71 K/uL  Hemoglobin : 9.7 g/dL  Hematocrit : 34.6 %  Platelet Count - Automated : 195 K/uL  Mean Cell Volume : 96.4 fl  Mean Cell Hemoglobin : 27.0 pg  Mean Cell Hemoglobin Concentration : 28.0 gm/dL  Auto Neutrophil # : x  Auto Lymphocyte # : x  Auto Monocyte # : x  Auto Eosinophil # : x  Auto Basophil # : x  Auto Neutrophil % : x  Auto Lymphocyte % : x  Auto Monocyte % : x  Auto Eosinophil % : x  Auto Basophil % : x    28 May 2024 06:16    155    |  116    |  55     ----------------------------<  282    4.2     |  41     |  1.80     Ca    9.3        28 May 2024 06:16    TPro  5.8    /  Alb  2.1    /  TBili  0.4    /  DBili  x      /  AST  35     /  ALT  45     /  AlkPhos  130    28 May 2024 06:16    PT/INR - ( 27 May 2024 03:37 )   PT: 16.4 sec;   INR: 1.42 ratio         PTT - ( 28 May 2024 06:16 )  PTT:71.7 sec  Urinalysis Basic - ( 28 May 2024 06:16 )    Color: x / Appearance: x / SG: x / pH: x  Gluc: 282 mg/dL / Ketone: x  / Bili: x / Urobili: x   Blood: x / Protein: x / Nitrite: x   Leuk Esterase: x / RBC: x / WBC x   Sq Epi: x / Non Sq Epi: x / Bacteria: x      CAPILLARY BLOOD GLUCOSE      POCT Blood Glucose.: 273 mg/dL (28 May 2024 06:47)  POCT Blood Glucose.: 238 mg/dL (28 May 2024 05:20)  POCT Blood Glucose.: 199 mg/dL (28 May 2024 00:14)  POCT Blood Glucose.: 200 mg/dL (27 May 2024 22:39)  POCT Blood Glucose.: 253 mg/dL (27 May 2024 19:24)  POCT Blood Glucose.: 264 mg/dL (27 May 2024 18:06)  POCT Blood Glucose.: 282 mg/dL (27 May 2024 13:36)  POCT Blood Glucose.: 247 mg/dL (27 May 2024 11:42)        Culture - Urine (collected 05-26-24 @ 17:49)  Source: Catheterized Catheterized  Final Report (05-27-24 @ 16:16):    No growth    Culture - Blood (collected 05-26-24 @ 17:00)  Source: .Blood Blood-Peripheral  Preliminary Report (05-27-24 @ 22:02):    No growth at 24 hours    Culture - Blood (collected 05-26-24 @ 16:45)  Source: .Blood Blood-Peripheral  Preliminary Report (05-27-24 @ 22:02):    No growth at 24 hours        RADIOLOGY & ADDITIONAL TESTS:    Personally reviewed.     Consultant(s) Notes Reviewed:  [x] YES  [ ] NO     Patient is a 84y old  Male who presents with a chief complaint of AMS, CVA (27 May 2024 13:50)      INTERVAL HPI/OVERNIGHT EVENTS: Pt seen and examined at bedside. No overnight events. Patient is minimally responsive to sternal rub and verbal stimuli.     MEDICATIONS  (STANDING):  acetaminophen     Tablet .. 650 milliGRAM(s) Oral every 6 hours  albuterol    90 MICROgram(s) HFA Inhaler 2 Puff(s) Inhalation every 6 hours  aMIOdarone    Tablet 200 milliGRAM(s) Oral daily  atorvastatin 80 milliGRAM(s) Oral at bedtime  budesonide 160 MICROgram(s)/formoterol 4.5 MICROgram(s) Inhaler 2 Puff(s) Inhalation two times a day  dextrose 10% Bolus 125 milliLiter(s) IV Bolus once  dextrose 5%. 1000 milliLiter(s) (100 mL/Hr) IV Continuous <Continuous>  dextrose 5%. 1000 milliLiter(s) (50 mL/Hr) IV Continuous <Continuous>  dextrose 5%. 1000 milliLiter(s) (75 mL/Hr) IV Continuous <Continuous>  dextrose 50% Injectable 12.5 Gram(s) IV Push once  dextrose 50% Injectable 25 Gram(s) IV Push once  glucagon  Injectable 1 milliGRAM(s) IntraMuscular once  heparin   Injectable 8000 Unit(s) IV Push once  heparin  Infusion.  Unit(s)/Hr (18 mL/Hr) IV Continuous <Continuous>  insulin glargine Injectable (LANTUS) 10 Unit(s) SubCutaneous once  insulin glargine Injectable (LANTUS) 30 Unit(s) SubCutaneous at bedtime  insulin lispro (ADMELOG) corrective regimen sliding scale   SubCutaneous <User Schedule>  magnesium oxide 400 milliGRAM(s) Oral daily  metoprolol tartrate 25 milliGRAM(s) Oral two times a day  montelukast 10 milliGRAM(s) Oral at bedtime  piperacillin/tazobactam IVPB.. 3.375 Gram(s) IV Intermittent every 8 hours  tiotropium 2.5 MICROgram(s) Inhaler 2 Puff(s) Inhalation daily  zinc sulfate 220 milliGRAM(s) Oral daily    MEDICATIONS  (PRN):  dextrose Oral Gel 15 Gram(s) Oral once PRN Blood Glucose LESS THAN 70 milliGRAM(s)/deciliter  heparin   Injectable 4000 Unit(s) IV Push every 6 hours PRN For aPTT between 40 - 57  heparin   Injectable 8000 Unit(s) IV Push every 6 hours PRN For aPTT less than 40      Allergies    No Known Allergies    Intolerances        REVIEW OF SYSTEMS:  unable to assess 2/2 ams      Vital Signs Last 24 Hrs  T(C): 36.3 (28 May 2024 05:00), Max: 36.9 (27 May 2024 20:42)  T(F): 97.4 (28 May 2024 05:00), Max: 98.4 (27 May 2024 20:42)  HR: 108 (28 May 2024 05:00) (92 - 108)  BP: 104/65 (28 May 2024 05:00) (96/62 - 115/73)  BP(mean): --  RR: 17 (28 May 2024 05:00) (17 - 18)  SpO2: 95% (28 May 2024 05:00) (94% - 98%)    Parameters below as of 28 May 2024 05:00  Patient On (Oxygen Delivery Method): nasal cannula  O2 Flow (L/min): 4      PHYSICAL EXAM:  GENERAL: ill-appearing, elderly male, minimally responsive  HEENT:  NC/AT, unable to assess EOM, anicteric, dry mucous membranes  CHEST/LUNG:  Coarse breath sounds bilaterally  HEART:  RRR, S1, S2  ABDOMEN:  BS+, soft, nontender, nondistended. PEG tube c/d/i  MUSCULOSKELETAL: no edema, cyanosis, or calf tenderness  NEUROLOGIC: AAOX0. Does not respond to commands for strength assessment. however, L fore arm  up Bending activity noted, LLE movement noted but not against gravity       LABS:                        9.7    15.71 )-----------( 195      ( 28 May 2024 06:16 )             34.6     CBC Full  -  ( 28 May 2024 06:16 )  WBC Count : 15.71 K/uL  Hemoglobin : 9.7 g/dL  Hematocrit : 34.6 %  Platelet Count - Automated : 195 K/uL  Mean Cell Volume : 96.4 fl  Mean Cell Hemoglobin : 27.0 pg  Mean Cell Hemoglobin Concentration : 28.0 gm/dL  Auto Neutrophil # : x  Auto Lymphocyte # : x  Auto Monocyte # : x  Auto Eosinophil # : x  Auto Basophil # : x  Auto Neutrophil % : x  Auto Lymphocyte % : x  Auto Monocyte % : x  Auto Eosinophil % : x  Auto Basophil % : x    28 May 2024 06:16    155    |  116    |  55     ----------------------------<  282    4.2     |  41     |  1.80     Ca    9.3        28 May 2024 06:16    TPro  5.8    /  Alb  2.1    /  TBili  0.4    /  DBili  x      /  AST  35     /  ALT  45     /  AlkPhos  130    28 May 2024 06:16    PT/INR - ( 27 May 2024 03:37 )   PT: 16.4 sec;   INR: 1.42 ratio         PTT - ( 28 May 2024 06:16 )  PTT:71.7 sec  Urinalysis Basic - ( 28 May 2024 06:16 )    Color: x / Appearance: x / SG: x / pH: x  Gluc: 282 mg/dL / Ketone: x  / Bili: x / Urobili: x   Blood: x / Protein: x / Nitrite: x   Leuk Esterase: x / RBC: x / WBC x   Sq Epi: x / Non Sq Epi: x / Bacteria: x      CAPILLARY BLOOD GLUCOSE      POCT Blood Glucose.: 273 mg/dL (28 May 2024 06:47)  POCT Blood Glucose.: 238 mg/dL (28 May 2024 05:20)  POCT Blood Glucose.: 199 mg/dL (28 May 2024 00:14)  POCT Blood Glucose.: 200 mg/dL (27 May 2024 22:39)  POCT Blood Glucose.: 253 mg/dL (27 May 2024 19:24)  POCT Blood Glucose.: 264 mg/dL (27 May 2024 18:06)  POCT Blood Glucose.: 282 mg/dL (27 May 2024 13:36)  POCT Blood Glucose.: 247 mg/dL (27 May 2024 11:42)        Culture - Urine (collected 05-26-24 @ 17:49)  Source: Catheterized Catheterized  Final Report (05-27-24 @ 16:16):    No growth    Culture - Blood (collected 05-26-24 @ 17:00)  Source: .Blood Blood-Peripheral  Preliminary Report (05-27-24 @ 22:02):    No growth at 24 hours    Culture - Blood (collected 05-26-24 @ 16:45)  Source: .Blood Blood-Peripheral  Preliminary Report (05-27-24 @ 22:02):    No growth at 24 hours        RADIOLOGY & ADDITIONAL TESTS:    Personally reviewed.     Consultant(s) Notes Reviewed:  [x] YES  [ ] NO

## 2024-05-28 NOTE — SOCIAL WORK PROGRESS NOTE - NSSWPROGRESSNOTE_GEN_ALL_CORE
Discussed today at rounds. Patient and spouse well known to me from recent hospitalization when he had a stroke and was [facility]. I met with wife today. She states he was making some progress with PT there but other than the PT she was very unhappy with the care there. Seems she will want another facility. Discussed possibility of referring to acute rehabs if he makes significant progress with PT here. He was denied from the acute rehab during the last stay here. Support provided to wife. She was encouraged to call as needed. Social work to follow.

## 2024-05-28 NOTE — PROGRESS NOTE ADULT - SUBJECTIVE AND OBJECTIVE BOX
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 Pt now presenting from Rehab with decline in mental status per wife Sania, at bedside. History obtained entirely from wife, as pt unable to speak presently. States she initially noticed him being "nonresponsive," noncommunicative on yesterday in the rehab, with no change into today. States that before this he did have slurred speech with waxing and waning dysarthria, however was baseline alert and oriented x3. Was also baseline with minimal to no motor function of RLE, RUE, with diminished motor function of LUE, LLE, was requiring assistance in all activities. Of note, wife states she noticed patient was not on Ciprofloxacin (chronic med for OM) while in rehab, and inquired as to why. No answer as to why, however patient was restarted on Cipro in Rehab.    ED Course:  Vitals: T 100.8 F, , BP 95/59, RR 26, SpO2 93% on 2L  Given: Ofirmev x1, Rocephin x1, 1L NS Bolus x2  Pertinent Labs: WBC 10.5, Na 150, HCO3 42, BUN/Cr 50/1.8, Glu 414. Mg 2.8. Lactate 2.2 --> 1.5. HsTi 185.6. Pro-.  AB.44 / 65 / 89 / 44    Imaging:  CT C/A/P No con: Layering secretions in the lower trachea and bilateral proximal mainstem  bronchi with scattered opacification of distal branching lobar and segmental airways bilaterally and with peribronchial wall thickening in lower lobes suggesting bronchitis. No lung consolidation. No source of infection identified in the abdomen or pelvis. PEG tube in place. Scattered colonic diverticula.  CT Head No Con: 1.  Gyriform hyperdensity in the region of a left frontal parietal subacute infarct suggesting cortical laminar necrosis or hemorrhage. 2.  Small subacute high right frontal infarct. Subacute bilateral cerebellar infarcts.    EKG: Sinus Tachycardia w/PVC on personal read; Rate 105, QTc 452 (26 May 2024 21:02)      Interval History -more alert today    Patient is seen, chart was reviewed and case was discussed with the treatment team.  Pt is not in any distress.   Lying on bed comfortably.       Vital Signs Last 24 Hrs  T(C): 37.8 (28 May 2024 18:15), Max: 37.8 (28 May 2024 18:15)  T(F): 100 (28 May 2024 18:15), Max: 100 (28 May 2024 18:15)  HR: 100 (28 May 2024 18:15) (80 - 108)  BP: 120/67 (28 May 2024 18:15) (96/62 - 129/68)  BP(mean): --  RR: 20 (28 May 2024 18:15) (17 - 20)  SpO2: 96% (28 May 2024 18:15) (95% - 99%)    Parameters below as of 28 May 2024 18:15  Patient On (Oxygen Delivery Method): nasal cannula  O2 Flow (L/min): 4          REVIEW OF SYSTEMS:  limited due to aphasia  not in any distress      Mental Status - Pt is , awake,  Follows simple comman    Speech - Pt has  aphasia/    Cranial Nerves - Pupils 3 mm equal and reactive to light, extraocular eye movements intact. Pt has no visual field deficit.  Pt has  right  facial asymmetry. Facial sensation is intact.Tongue - is in midline.    Muscle tone - is reduced on right  .      Motor Exam - RUE 0/5; RLE 1/5  Left side 2-3/5    Sensory Exam -. Pt withdraws all extremities equally on stimulation        coordination:    Finger to nose: normal    Heel to shin: normal    Deep tendon Reflexes - 2 plus all over          Neck Supple -  Yes.     MEDICATIONS    acetaminophen     Tablet .. 650 milliGRAM(s) Oral every 6 hours  albuterol    90 MICROgram(s) HFA Inhaler 2 Puff(s) Inhalation every 6 hours  aMIOdarone    Tablet 200 milliGRAM(s) Oral daily  atorvastatin 80 milliGRAM(s) Oral at bedtime  budesonide 160 MICROgram(s)/formoterol 4.5 MICROgram(s) Inhaler 2 Puff(s) Inhalation two times a day  dextrose 10% Bolus 125 milliLiter(s) IV Bolus once  dextrose 5%. 1000 milliLiter(s) IV Continuous <Continuous>  dextrose 5%. 1000 milliLiter(s) IV Continuous <Continuous>  dextrose 5%. 1000 milliLiter(s) IV Continuous <Continuous>  dextrose 50% Injectable 12.5 Gram(s) IV Push once  dextrose 50% Injectable 25 Gram(s) IV Push once  dextrose Oral Gel 15 Gram(s) Oral once PRN  glucagon  Injectable 1 milliGRAM(s) IntraMuscular once  insulin glargine Injectable (LANTUS) 30 Unit(s) SubCutaneous at bedtime  insulin lispro (ADMELOG) corrective regimen sliding scale   SubCutaneous <User Schedule>  magnesium oxide 400 milliGRAM(s) Oral daily  metoprolol tartrate 25 milliGRAM(s) Oral two times a day  montelukast 10 milliGRAM(s) Oral at bedtime  piperacillin/tazobactam IVPB.. 3.375 Gram(s) IV Intermittent every 8 hours  tiotropium 2.5 MICROgram(s) Inhaler 2 Puff(s) Inhalation daily  zinc sulfate 220 milliGRAM(s) Oral daily      Allergies    No Known Allergies    Intolerances                          9.7    15.71 )-----------( 195      ( 28 May 2024 06:16 )             34.6     Urinalysis Basic - ( 28 May 2024 06:16 )    Color: x / Appearance: x / SG: x / pH: x  Gluc: 282 mg/dL / Ketone: x  / Bili: x / Urobili: x   Blood: x / Protein: x / Nitrite: x   Leuk Esterase: x / RBC: x / WBC x   Sq Epi: x / Non Sq Epi: x / Bacteria: x          155<H>  |  116<H>  |  55<H>  ----------------------------<  282<H>  4.2   |  41<H>  |  1.80<H>    Ca    9.3      28 May 2024 06:16    TPro  5.8<L>  /  Alb  2.1<L>  /  TBili  0.4  /  DBili  x   /  AST  35  /  ALT  45  /  AlkPhos  130<H>      Hemoglobin A1C:     Vitamin B12     RADIOLOGY    ASSESSMENT AND PLAN:      seen for ams  related to-  new cva  also ME    RESUME AC from tomorrow-if repeat ct head-stable  Physical therapy evaluation.  OOB to chair/ambulation with assistance only.  Pain is accessed and addressed.  Pt was screened for signs of clinical depression. Appropriate referr  Plan of care was discussed with family. Questions answered.  Would continue to follow.

## 2024-05-28 NOTE — SWALLOW BEDSIDE ASSESSMENT ADULT - COMMENTS
As per Hospitalist Note dated 5/28/24 "85yo M PMHx AF (on Eliquis), CAD (s/p multiple stents), HTN, DM, HLD, COPD, OM (L Femur, on Cipro), recently admitted to [facility] for RVR, hospital course complicated by new L M3 CVA, PEG tube placed 5/15, subsequently discharged to [facility]. Now presenting from rehab and admitted for new AMS, new B/L ischemic strokes."    CT Chest 5/26/24 "IMPRESSION: Layering secretions in the lower trachea and bilateral proximal mainstem bronchi with scattered opacification of distal branching lobar and segmental airways bilaterally and with peribronchial wall thickening in lower lobes suggesting bronchitis. No lung consolidation. No source of infection identified in the abdomen or pelvis."    CTH 5/26/24 "IMPRESSION: 1.  Gyriform hyperdensity in the region of a left frontal parietal subacute infarct suggesting cortical laminar necrosis or hemorrhage.  2.  Small subacute high right frontal infarct. Subacute bilateral cerebellar infarcts."    Of Note: Patient is known to this service, seen for an MBS on 5/3/24 with recommendation of Puree and Mildly Thick Liquids via teaspoon (see report for details).     Clinical swallow evaluation ordered and attempted. Per discussion with RN, patient off the unit for MRI. This service to follow-up as schedule permits. As per Hospitalist Note dated 5/28/24 "83yo M PMHx AF (on Eliquis), CAD (s/p multiple stents), HTN, DM, HLD, COPD, OM (L Femur, on Cipro), recently admitted to [facility] for RVR, hospital course complicated by new L M3 CVA, PEG tube placed 5/15, subsequently discharged to [facility]. Now presenting from rehab and admitted for new AMS, new B/L ischemic strokes."    CT Chest 5/26/24 "IMPRESSION: Layering secretions in the lower trachea and bilateral proximal mainstem bronchi with scattered opacification of distal branching lobar and segmental airways bilaterally and with peribronchial wall thickening in lower lobes suggesting bronchitis. No lung consolidation. No source of infection identified in the abdomen or pelvis."    CTH 5/26/24 "IMPRESSION: 1.  Gyriform hyperdensity in the region of a left frontal parietal subacute infarct suggesting cortical laminar necrosis or hemorrhage.  2.  Small subacute high right frontal infarct. Subacute bilateral cerebellar infarcts."    Of Note: Patient is known to this service, seen for an MBS on 5/3/24 with recommendation of Puree and Mildly Thick Liquids via teaspoon (see report for details).     Clinical swallow evaluation ordered and attempted. Per discussion with RN, patient off the unit for MRI. This service to follow-up as schedule permits.    ADDENDUM: Patient re-visited at bedside for clinical swallow evaluation. Patient initially in a sleep state, able to rouse with verbal and tactile cues. Patient is not following 1-step directives at this time. Limited verbalizations produced which were unintelligible, suspect due to low vocal volume. Patient receiving supplemental oxygen via nasal cannula.

## 2024-05-28 NOTE — SWALLOW BEDSIDE ASSESSMENT ADULT - ADDITIONAL RECOMMENDATIONS
2. Patient would benefit from frequent oral hygiene care and monitoring of secretion management. Assist with oral suctioning as indicated. 3. This service to follow-up as schedule permits to determine candidacy for oral intake. 4. Medical team further advised to reconsult this department with any change in medical status and/or as patient becomes medically optimized.

## 2024-05-28 NOTE — PROGRESS NOTE ADULT - SUBJECTIVE AND OBJECTIVE BOX
Patient is a 84y old  Male who presents with a chief complaint of AMS, CVA (28 May 2024 10:41)    Patient seen in follow up for hypernatremia, MERRILL.        PAST MEDICAL HISTORY:  Diabetes Mellitus, Type II    CAD (Coronary Artery Disease)    Dyslipidemia    Asymptomatic Peripheral Vascular Disease    Osteomyelitis    COPD (chronic obstructive pulmonary disease)    Diabetes mellitus    Hypertension    PVD (peripheral vascular disease)    History of PAT (paroxysmal atrial tachycardia)    Asthma with COPD    BPH (benign prostatic hyperplasia)    Acute osteomyelitis      MEDICATIONS  (STANDING):  acetaminophen     Tablet .. 650 milliGRAM(s) Oral every 6 hours  albuterol    90 MICROgram(s) HFA Inhaler 2 Puff(s) Inhalation every 6 hours  aMIOdarone    Tablet 200 milliGRAM(s) Oral daily  atorvastatin 80 milliGRAM(s) Oral at bedtime  budesonide 160 MICROgram(s)/formoterol 4.5 MICROgram(s) Inhaler 2 Puff(s) Inhalation two times a day  dextrose 10% Bolus 125 milliLiter(s) IV Bolus once  dextrose 5%. 1000 milliLiter(s) (100 mL/Hr) IV Continuous <Continuous>  dextrose 5%. 1000 milliLiter(s) (50 mL/Hr) IV Continuous <Continuous>  dextrose 5%. 1000 milliLiter(s) (75 mL/Hr) IV Continuous <Continuous>  dextrose 50% Injectable 12.5 Gram(s) IV Push once  dextrose 50% Injectable 25 Gram(s) IV Push once  glucagon  Injectable 1 milliGRAM(s) IntraMuscular once  heparin   Injectable 8000 Unit(s) IV Push once  heparin  Infusion.  Unit(s)/Hr (18 mL/Hr) IV Continuous <Continuous>  insulin glargine Injectable (LANTUS) 30 Unit(s) SubCutaneous at bedtime  insulin glargine Injectable (LANTUS) 10 Unit(s) SubCutaneous once  insulin lispro (ADMELOG) corrective regimen sliding scale   SubCutaneous <User Schedule>  magnesium oxide 400 milliGRAM(s) Oral daily  metoprolol tartrate 25 milliGRAM(s) Oral two times a day  montelukast 10 milliGRAM(s) Oral at bedtime  piperacillin/tazobactam IVPB.. 3.375 Gram(s) IV Intermittent every 8 hours  tiotropium 2.5 MICROgram(s) Inhaler 2 Puff(s) Inhalation daily  zinc sulfate 220 milliGRAM(s) Oral daily    MEDICATIONS  (PRN):  dextrose Oral Gel 15 Gram(s) Oral once PRN Blood Glucose LESS THAN 70 milliGRAM(s)/deciliter  heparin   Injectable 4000 Unit(s) IV Push every 6 hours PRN For aPTT between 40 - 57  heparin   Injectable 8000 Unit(s) IV Push every 6 hours PRN For aPTT less than 40    T(C): 36.3 (05-28-24 @ 05:00), Max: 36.9 (05-27-24 @ 20:42)  HR: 108 (05-28-24 @ 05:00) (92 - 108)  BP: 104/65 (05-28-24 @ 05:00) (96/62 - 137/82)  RR: 17 (05-28-24 @ 05:00) (17 - 22)  SpO2: 95% (05-28-24 @ 05:00) (90% - 98%)  Wt(kg): --  I&O's Detail    27 May 2024 07:01  -  28 May 2024 07:00  --------------------------------------------------------  IN:  Total IN: 0 mL    OUT:    Voided (mL): 1700 mL  Total OUT: 1700 mL    Total NET: -1700 mL          PHYSICAL EXAM:  General: No distress  Respiratory: b/l air entry  Cardiovascular: S1 S2  Gastrointestinal: soft  Extremities:  + edema                              9.7    15.71 )-----------( 195      ( 28 May 2024 06:16 )             34.6     05-28    155<H>  |  116<H>  |  55<H>  ----------------------------<  282<H>  4.2   |  41<H>  |  1.80<H>    Ca    9.3      28 May 2024 06:16  Mg     2.8     05-26    TPro  5.8<L>  /  Alb  2.1<L>  /  TBili  0.4  /  DBili  x   /  AST  35  /  ALT  45  /  AlkPhos  130<H>  05-28    CARDIAC MARKERS ( 26 May 2024 17:10 )  x     / x     / x     / x     / 1.7 ng/mL      LIVER FUNCTIONS - ( 28 May 2024 06:16 )  Alb: 2.1 g/dL / Pro: 5.8 g/dL / ALK PHOS: 130 U/L / ALT: 45 U/L / AST: 35 U/L / GGT: x           Urinalysis Basic - ( 28 May 2024 06:16 )    Color: x / Appearance: x / SG: x / pH: x  Gluc: 282 mg/dL / Ketone: x  / Bili: x / Urobili: x   Blood: x / Protein: x / Nitrite: x   Leuk Esterase: x / RBC: x / WBC x   Sq Epi: x / Non Sq Epi: x / Bacteria: x      ABG - ( 26 May 2024 16:24 )  pH, Arterial: 7.44  pH, Blood: x     /  pCO2: 65    /  pO2: 89    / HCO3: 44    / Base Excess: 19.9  /  SaO2: 98.5              Sodium, Serum: 155 (05-28 @ 06:16)  Sodium, Serum: 150 (05-27 @ 07:27)  Sodium, Serum: 150 (05-26 @ 17:10)    Creatinine, Serum: 1.80 (05-28 @ 06:16)  Creatinine, Serum: 1.60 (05-27 @ 07:27)  Creatinine, Serum: 1.80 (05-26 @ 17:10)    Potassium, Serum: 4.2 (05-28 @ 06:16)  Potassium, Serum: 4.5 (05-27 @ 07:27)  Potassium, Serum: 5.1 (05-26 @ 17:10)    Hemoglobin: 9.7 (05-28 @ 06:16)  Hemoglobin: 10.8 (05-27 @ 07:27)  Hemoglobin: 11.2 (05-27 @ 03:37)  Hemoglobin: 12.0 (05-26 @ 17:10)

## 2024-05-28 NOTE — PROGRESS NOTE ADULT - PROBLEM SELECTOR PLAN 3
DNR/DNI in place  wife hopeful for improvement given some improvement in mental status today  wants to continue medical management

## 2024-05-28 NOTE — CONSULT NOTE ADULT - ASSESSMENT
85yo M PMHx AF (on Eliquis), CAD (s/p multiple stents), HTN, DM, HLD, COPD, OM (L Femur, on Cipro), recently admitted to [facility] for RVR, hospital course complicated by new L M3 CVA, PEG tube placed 5/15, subsequently discharged to [facility]. Pt now presenting from Rehab with decline in mental status 85yo M PMHx AF (on Eliquis), CAD (s/p multiple stents), HTN, DM, HLD, COPD, OM (L Femur, on Cipro), recently admitted to [facility] for RVR, hospital course complicated by new L M3 CVA, PEG tube placed 5/15, subsequently discharged to [facility]. Pt now presenting from Rehab with decline in mental status    cva  hemorrhagic cva eval  cad  htn  dm  COURTNEY  asthma  copd  OM  ataxic gait  anemia    on TF  cvs rx regimen  on inhaler rx regimen  got Nucala on recent admission at [place] for Asthma management  follows with Dr Marcelino - [facility] -   monitor VS and Sat  assist with needs  on emp ABX  MRI and CT imaging reviewed  HOB elev - asp prec - oral hygiene  GOC - discussion

## 2024-05-28 NOTE — PROGRESS NOTE ADULT - SUBJECTIVE AND OBJECTIVE BOX
[facility]  INFECTIOUS DISEASES   [address] [place] [address]  Tel: [phone]     Fax: [phone]  ========================================================  MD oNman Perry Kaushal, MD Cho, Michelle, MD Sunjit, Jaspal, MD  ========================================================    [id number]  Y[facility] [place]     Follow up: Stroke and fever    Patient is more awake today, but no able to answer questions clearly. No fever.    PAST MEDICAL & SURGICAL HISTORY:  Diabetes Mellitus, Type II  CAD (Coronary Artery Disease)  s/p 3v CABG ; stents placed in [place] in   Dyslipidemia  Osteomyelitis  COPD (chronic obstructive pulmonary disease)  on 2L at home and BiPAP at night; intubated   Hypertension  PVD (peripheral vascular disease)  History of PAT (paroxysmal atrial tachycardia)  Asthma with COPD  BPH (benign prostatic hyperplasia)  Acute osteomyelitis  CABG (Coronary Artery Bypass Graft)    Compound fracture  left leg  S/P primary angioplasty with coronary stent  H/O drainage of abscess  Left femur 2021    Social Hx: No current smoking, EtOH or drugs     FAMILY HISTORY:  Family history of diabetes mellitus (Sibling)    Family hx of lung cancer  brother,  age 82, used to smoke with pt    Allergies  No Known Allergies    MEDICATIONS  (STANDING):  acetaminophen     Tablet .. 650 milliGRAM(s) Oral every 6 hours  albuterol    90 MICROgram(s) HFA Inhaler 2 Puff(s) Inhalation every 6 hours  aMIOdarone    Tablet 200 milliGRAM(s) Oral daily  atorvastatin 80 milliGRAM(s) Oral at bedtime  budesonide 160 MICROgram(s)/formoterol 4.5 MICROgram(s) Inhaler 2 Puff(s) Inhalation two times a day  dextrose 10% Bolus 125 milliLiter(s) IV Bolus once  dextrose 5%. 1000 milliLiter(s) (100 mL/Hr) IV Continuous <Continuous>  dextrose 5%. 1000 milliLiter(s) (50 mL/Hr) IV Continuous <Continuous>  dextrose 50% Injectable 12.5 Gram(s) IV Push once  dextrose 50% Injectable 25 Gram(s) IV Push once  glucagon  Injectable 1 milliGRAM(s) IntraMuscular once  heparin   Injectable 8000 Unit(s) IV Push once  heparin  Infusion.  Unit(s)/Hr (18 mL/Hr) IV Continuous <Continuous>  insulin glargine Injectable (LANTUS) 30 Unit(s) SubCutaneous at bedtime  insulin lispro (ADMELOG) corrective regimen sliding scale   SubCutaneous every 6 hours  insulin lispro Injectable (ADMELOG) 3 Unit(s) SubCutaneous every 6 hours  magnesium oxide 400 milliGRAM(s) Oral daily  metoprolol tartrate 25 milliGRAM(s) Oral two times a day  montelukast 10 milliGRAM(s) Oral at bedtime  piperacillin/tazobactam IVPB. 3.375 Gram(s) IV Intermittent once  piperacillin/tazobactam IVPB.- 3.375 Gram(s) IV Intermittent once  piperacillin/tazobactam IVPB.. 3.375 Gram(s) IV Intermittent every 8 hours  tiotropium 2.5 MICROgram(s) Inhaler 2 Puff(s) Inhalation daily  zinc sulfate 220 milliGRAM(s) Oral daily    MEDICATIONS  (PRN):  dextrose Oral Gel 15 Gram(s) Oral once PRN Blood Glucose LESS THAN 70 milliGRAM(s)/deciliter  heparin   Injectable 4000 Unit(s) IV Push every 6 hours PRN For aPTT between 40 - 57  heparin   Injectable 8000 Unit(s) IV Push every 6 hours PRN For aPTT less than 40     REVIEW OF SYSTEMS:  Unable     Physical Exam:  Vital Signs Last 24 Hrs  T(C): 36.6 (28 May 2024 13:10), Max: 36.9 (27 May 2024 20:42)  T(F): 97.9 (28 May 2024 13:10), Max: 98.4 (27 May 2024 20:42)  HR: 80 (28 May 2024 13:10) (80 - 108)  BP: 129/68 (28 May 2024 13:10) (96/62 - 129/68)  RR: 20 (28 May 2024 13:10) (17 - 20)  SpO2: 99% (28 May 2024 13:10) (94% - 99%)  Parameters below as of 28 May 2024 13:10  Patient On (Oxygen Delivery Method): nasal cannula  O2 Flow (L/min): 4  GEN: NAD lying bed on O2 with NC   HEENT: normocephalic and atraumatic.   NECK: Supple.  No lymphadenopathy   LUNGS: scattered rhonchi bilaterally   HEART: Irregular rate and rhythm  ABDOMEN: Soft, and nondistended. PEG in place looks fine  EXTREMITIES: Without edema. Left femur with scar and   fluctuation, no discharge from area   NEUROLOGIC: unable   PSYCHIATRIC: more awake and trying to communicate   SKIN: No rash     Labs:                        9.7    15.71 )-----------( 195      ( 28 May 2024 06:16 )             34.6         155<H>  |  116<H>  |  55<H>  ----------------------------<  282<H>  4.2   |  41<H>  |  1.80<H>    Ca    9.3      28 May 2024 06:16  Mg     2.8         TPro  5.8<L>  /  Alb  2.1<L>  /  TBili  0.4  /  DBili  x   /  AST  35  /  ALT  45  /  AlkPhos  130<H>      Culture - Urine (collected 24 @ 17:49)  Source: Catheterized Catheterized  Final Report (24 @ 16:16):    No growth    Culture - Blood (collected 24 @ 17:00)  Source: .Blood Blood-Peripheral  Preliminary Report (24 @ 22:02):    No growth at 24 hours    Culture - Blood (collected 24 @ 16:45)  Source: .Blood Blood-Peripheral  Preliminary Report (24 @ 22:02):    No growth at 24 hours    WBC Count: 15.71 K/uL (24 @ 06:16)  WBC Count: 15.99 K/uL (24 @ 07:27)  WBC Count: 16.67 K/uL (24 @ 03:37)  WBC Count: 10.53 K/uL (24 @ 17:10)    Creatinine: 1.80 mg/dL (24 @ 06:16)  Creatinine: 1.60 mg/dL (24 @ 07:27)  Creatinine: 1.80 mg/dL (24 @ 17:10)     SARS-CoV-2 Result: NotDetec (24 @ 17:10)    All imaging and other data have been reviewed.  < from: CT Abdomen and Pelvis No Cont (24 @ 18:37) >  IMPRESSION:  Layering secretions in the lower trachea and bilateral proximal mainstem   bronchi with scattered opacification of distal branching lobar and   segmental airways bilaterally and with peribronchial wall thickening in   lower lobes suggesting bronchitis. No lung consolidation.  No source of infection identified in the abdomen or pelvis.    CT head :  IMPRESSION:  1.  Gyriform hyperdensity in the region of a left frontal parietal   subacute infarct suggesting cortical laminar necrosis or hemorrhage.  2.  Small subacute high right frontal infarct. Subacute bilateral   cerebellar infarcts.    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   Pt now presenting from Rehab with decline in mental status currently not responding.   WBC 15-16k   In ED T 100.8 F  UA negative   Flu/RSV/COVID negative   CT chest/abd/p: bronchitis? otherwise negative   CT head: subacute infarcts     Most likely AMS 2' to a new stroke, but due to possible aspiration and recent hospitalization, on chronic cipro, will continue antibiotic.     # AMS  # Acute and subacute stroke  # R/O aspiration / Bronchitis   # PEG in place  # Left femur OM    - Blood cultures x 4 NGTD   - MRI of head with a new stroke   - Continue zosyn 3.375gm q8 for now   - Monitor WBC, today stable 15    Will follow.    Brandi العلي MD  Division of Infectious Diseases   Please call ID service at [phone] with any question.    50 minutes spent on total encounter assessing patient, examination, chart review, counseling and coordinating care by the attending physician/nurse/care manager.

## 2024-05-28 NOTE — CONSULT NOTE ADULT - SUBJECTIVE AND OBJECTIVE BOX
Date/Time Patient Seen:  		  Referring MD:   Data Reviewed	       Patient is a 84y old  Male who presents with a chief complaint of AMS, CVA (28 May 2024 13:59)      Subjective/HPI   83yo M PMHx AF (on Eliquis), CAD (s/p multiple stents), HTN, DM, HLD, COPD, OM (L Femur, on Cipro), recently admitted to [facility] for RVR, hospital course complicated by new L M3 CVA, PEG tube placed 5/15, subsequently discharged to [facility]. Pt now presenting from Rehab with decline in mental status  PAST MEDICAL & SURGICAL HISTORY:  Diabetes Mellitus, Type II    CAD (Coronary Artery Disease)  s/p 3v CABG ; stents [place]     Dyslipidemia    Asymptomatic Peripheral Vascular Disease    Osteomyelitis    COPD (chronic obstructive pulmonary disease)  on 2L at home and BiPAP at night; intubated     Diabetes mellitus    Hypertension    PVD (peripheral vascular disease)    History of PAT (paroxysmal atrial tachycardia)    Asthma with COPD    BPH (benign prostatic hyperplasia)    Acute osteomyelitis    CABG (Coronary Artery Bypass Graft)      Compound fracture  left leg    S/P primary angioplasty with coronary stent    H/O drainage of abscess  Left femur 2021    PAST SURGICAL HISTORY:  CABG (Coronary Artery Bypass Graft)     Compound fracture left leg    H/O drainage of abscess Left femur 2021    S/P primary angioplasty with coronary stent.     FAMILY HISTORY:  Family hx of lung cancer, brother,  age 82, used to smoke with pt    Sibling  Still living? Unknown  Family history of diabetes mellitus, Age at diagnosis: Age Unknown.     Social History:  · Substance use	No  · Social History (marital status, living situation, occupation, and sexual history)	Tobacco: denies  EtOH: denies  Recreational drugs: denies  Lives: [facility]  Ambulation: dependent  ADLs: dependent  Dietary restrictions: none, PEG tube     Tobacco Screening:  · Core Measure Site	Yes  · Has the patient used tobacco in the past 30 days?	No    Risk Assessment:    Present on Admission:  Deep Venous Thrombosis	no  Pulmonary Embolus	no     HIV Screening:  · In accordance with [place] State law, we offer every patient who comes to our ED an HIV test. Would you like to be tested today?	Opt out     Hepatitis C Test Questions:  · In accordance with [place] [employer] Law, we offer every patient a Hepatitis C test. Would you like to be tested today?	Opt out        Medication list         MEDICATIONS  (STANDING):  acetaminophen     Tablet .. 650 milliGRAM(s) Oral every 6 hours  albuterol    90 MICROgram(s) HFA Inhaler 2 Puff(s) Inhalation every 6 hours  aMIOdarone    Tablet 200 milliGRAM(s) Oral daily  atorvastatin 80 milliGRAM(s) Oral at bedtime  budesonide 160 MICROgram(s)/formoterol 4.5 MICROgram(s) Inhaler 2 Puff(s) Inhalation two times a day  dextrose 10% Bolus 125 milliLiter(s) IV Bolus once  dextrose 5%. 1000 milliLiter(s) (100 mL/Hr) IV Continuous <Continuous>  dextrose 5%. 1000 milliLiter(s) (75 mL/Hr) IV Continuous <Continuous>  dextrose 5%. 1000 milliLiter(s) (50 mL/Hr) IV Continuous <Continuous>  dextrose 50% Injectable 12.5 Gram(s) IV Push once  dextrose 50% Injectable 25 Gram(s) IV Push once  glucagon  Injectable 1 milliGRAM(s) IntraMuscular once  insulin glargine Injectable (LANTUS) 30 Unit(s) SubCutaneous at bedtime  insulin lispro (ADMELOG) corrective regimen sliding scale   SubCutaneous <User Schedule>  magnesium oxide 400 milliGRAM(s) Oral daily  metoprolol tartrate 25 milliGRAM(s) Oral two times a day  montelukast 10 milliGRAM(s) Oral at bedtime  piperacillin/tazobactam IVPB.. 3.375 Gram(s) IV Intermittent every 8 hours  tiotropium 2.5 MICROgram(s) Inhaler 2 Puff(s) Inhalation daily  zinc sulfate 220 milliGRAM(s) Oral daily    MEDICATIONS  (PRN):  dextrose Oral Gel 15 Gram(s) Oral once PRN Blood Glucose LESS THAN 70 milliGRAM(s)/deciliter         Vitals log        ICU Vital Signs Last 24 Hrs  T(C): 36.6 (28 May 2024 13:10), Max: 36.9 (27 May 2024 20:42)  T(F): 97.9 (28 May 2024 13:10), Max: 98.4 (27 May 2024 20:42)  HR: 80 (28 May 2024 13:10) (80 - 108)  BP: 129/68 (28 May 2024 13:10) (96/62 - 129/68)  BP(mean): --  ABP: --  ABP(mean): --  RR: 20 (28 May 2024 13:10) (17 - 20)  SpO2: 99% (28 May 2024 13:10) (94% - 99%)    O2 Parameters below as of 28 May 2024 13:10  Patient On (Oxygen Delivery Method): nasal cannula  O2 Flow (L/min): 4               Input and Output:  I&O's Detail    27 May 2024 07:01  -  28 May 2024 07:00  --------------------------------------------------------  IN:  Total IN: 0 mL    OUT:    Voided (mL): 1700 mL  Total OUT: 1700 mL    Total NET: -1700 mL          Lab Data                        9.7    15.71 )-----------( 195      ( 28 May 2024 06:16 )             34.6     05-28    155<H>  |  116<H>  |  55<H>  ----------------------------<  282<H>  4.2   |  41<H>  |  1.80<H>    Ca    9.3      28 May 2024 06:16  Mg     2.8         TPro  5.8<L>  /  Alb  2.1<L>  /  TBili  0.4  /  DBili  x   /  AST  35  /  ALT  45  /  AlkPhos  130<H>  -28    ABG - ( 26 May 2024 16:24 )  pH, Arterial: 7.44  pH, Blood: x     /  pCO2: 65    /  pO2: 89    / HCO3: 44    / Base Excess: 19.9  /  SaO2: 98.5              CARDIAC MARKERS ( 26 May 2024 17:10 )  x     / x     / x     / x     / 1.7 ng/mL        Review of Systems	  ams  weakness  ataxic gait      Objective     Physical Examination        Pertinent Lab findings & Imaging      Eliecer:  NO   Adequate UO     I&O's Detail    27 May 2024 07:01  -  28 May 2024 07:00  --------------------------------------------------------  IN:  Total IN: 0 mL    OUT:    Voided (mL): 1700 mL  Total OUT: 1700 mL    Total NET: -1700 mL               Discussed with:     Cultures:	        Radiology      ACC: [id number] EXAM:  MR BRAIN   ORDERED BY: BERNADINE CARVAJALGERDA     PROCEDURE DATE:  2024          INTERPRETATION:  CLINICAL INDICATIONS: CVA    COMPARISON: Head CT dated 2024. MRI brain dated 2024    TECHNIQUE: MRI brain: Multiplanar, multisequence MR imaging of the brain   are obtained without the administration of intravenous Gadavist contrast.    FINDINGS:    There are improving areas of abnormal restricted diffusion involving the   posterior left frontal parietal lobe, right frontal lobe, bilateral   occipital lobes, bilateral cerebral hemispheres compatible with subacute   infarctions. Hemosiderin deposition/petechial hemorrhage in the left   parietal lobe on image 33 of series 6. New posterior left corona   radiata/frontal lobe infarct on image 18 of series 3.    Left parietal convexity extra-axial lesion measuring 1.9 x 1.3 x 1.7 cm   suggesting a meningioma. Scattered periventricular and subcortical white   matter T2 /FLAIR hyperintensities are seen without mass effect,   nonspecific, likely representing mild chronic microvascular changes.   Normal T2 flow-voids are seen within  the intracranial vasculature. The   lateral ventricles and cortical sulci are age-appropriate in size and   configuration. There is no mass, mass effect, or extra-axial fluid   collection.    Multiple tiny foci of abnormal susceptibility artifacts in the bilateral   corona radiata, bilateral occipital lobes, bilateral cerebral hemisphere   suggesting microhemorrhages. Midline structures are normal.  The patient   is status post bilateral ocular lens replacement surgery. Mild   inflammatory mucosal changes are seen throughout the various portions of   the paranasal sinuses. The orbits and mastoid air cells are unremarkable.   .      IMPRESSION: New posterior left corona radiata/frontal lobe infarct.    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   Petechial hemorrhage in the left parietal lobe. Bilateral areas of recent   microhemorrhages.    --- End of Report ---            TEODORO BENITEZ MD; Attending Radiologist  This document has been electronically signed. 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 Pt now presenting from Rehab with decline in mental status  PAST MEDICAL & SURGICAL HISTORY:  Diabetes Mellitus, Type II    CAD (Coronary Artery Disease)  s/p 3v CABG ; stents placed [place]     Dyslipidemia    Asymptomatic Peripheral Vascular Disease    Osteomyelitis    COPD (chronic obstructive pulmonary disease)  on 2L at home and BiPAP at night; intubated     Diabetes mellitus    Hypertension    PVD (peripheral vascular disease)    History of PAT (paroxysmal atrial tachycardia)    Asthma with COPD    BPH (benign prostatic hyperplasia)    Acute osteomyelitis    CABG (Coronary Artery Bypass Graft)      Compound fracture  left leg    S/P primary angioplasty with coronary stent    H/O drainage of abscess  Left femur 2021    PAST SURGICAL HISTORY:  CABG (Coronary Artery Bypass Graft)     Compound fracture left leg    H/O drainage of abscess Left femur 2021    S/P primary angioplasty with coronary stent.     FAMILY HISTORY:  Family hx of lung cancer, brother,  age 82, used to smoke with pt    Sibling  Still living? Unknown  Family history of diabetes mellitus, Age at diagnosis: Age Unknown.     Social History:  · Substance use	No  · Social History (marital status, living situation, occupation, and sexual history)	Tobacco: denies  EtOH: denies  Recreational drugs: denies  Lives: [facility]  Ambulation: dependent  ADLs: dependent  Dietary restrictions: none, PEG tube     Tobacco Screening:  · Core Measure Site	Yes  · Has the patient used tobacco in the past 30 days?	No    Risk Assessment:    Present on Admission:  Deep Venous Thrombosis	no  Pulmonary Embolus	no     HIV Screening:  · In accordance with [place] State law, we offer every patient who comes to our ED an HIV test. Would you like to be tested today?	Opt out     Hepatitis C Test Questions:  · In accordance with [place] [employer] Law, we offer every patient a Hepatitis C test. Would you like to be tested today?	Opt out        Medication list         MEDICATIONS  (STANDING):  acetaminophen     Tablet .. 650 milliGRAM(s) Oral every 6 hours  albuterol    90 MICROgram(s) HFA Inhaler 2 Puff(s) Inhalation every 6 hours  aMIOdarone    Tablet 200 milliGRAM(s) Oral daily  atorvastatin 80 milliGRAM(s) Oral at bedtime  budesonide 160 MICROgram(s)/formoterol 4.5 MICROgram(s) Inhaler 2 Puff(s) Inhalation two times a day  dextrose 10% Bolus 125 milliLiter(s) IV Bolus once  dextrose 5%. 1000 milliLiter(s) (100 mL/Hr) IV Continuous <Continuous>  dextrose 5%. 1000 milliLiter(s) (75 mL/Hr) IV Continuous <Continuous>  dextrose 5%. 1000 milliLiter(s) (50 mL/Hr) IV Continuous <Continuous>  dextrose 50% Injectable 12.5 Gram(s) IV Push once  dextrose 50% Injectable 25 Gram(s) IV Push once  glucagon  Injectable 1 milliGRAM(s) IntraMuscular once  insulin glargine Injectable (LANTUS) 30 Unit(s) SubCutaneous at bedtime  insulin lispro (ADMELOG) corrective regimen sliding scale   SubCutaneous <User Schedule>  magnesium oxide 400 milliGRAM(s) Oral daily  metoprolol tartrate 25 milliGRAM(s) Oral two times a day  montelukast 10 milliGRAM(s) Oral at bedtime  piperacillin/tazobactam IVPB.. 3.375 Gram(s) IV Intermittent every 8 hours  tiotropium 2.5 MICROgram(s) Inhaler 2 Puff(s) Inhalation daily  zinc sulfate 220 milliGRAM(s) Oral daily    MEDICATIONS  (PRN):  dextrose Oral Gel 15 Gram(s) Oral once PRN Blood Glucose LESS THAN 70 milliGRAM(s)/deciliter         Vitals log        ICU Vital Signs Last 24 Hrs  T(C): 36.6 (28 May 2024 13:10), Max: 36.9 (27 May 2024 20:42)  T(F): 97.9 (28 May 2024 13:10), Max: 98.4 (27 May 2024 20:42)  HR: 80 (28 May 2024 13:10) (80 - 108)  BP: 129/68 (28 May 2024 13:10) (96/62 - 129/68)  BP(mean): --  ABP: --  ABP(mean): --  RR: 20 (28 May 2024 13:10) (17 - 20)  SpO2: 99% (28 May 2024 13:10) (94% - 99%)    O2 Parameters below as of 28 May 2024 13:10  Patient On (Oxygen Delivery Method): nasal cannula  O2 Flow (L/min): 4               Input and Output:  I&O's Detail    27 May 2024 07:01  -  28 May 2024 07:00  --------------------------------------------------------  IN:  Total IN: 0 mL    OUT:    Voided (mL): 1700 mL  Total OUT: 1700 mL    Total NET: -1700 mL          Lab Data                        9.7    15.71 )-----------( 195      ( 28 May 2024 06:16 )             34.6     05-28    155<H>  |  116<H>  |  55<H>  ----------------------------<  282<H>  4.2   |  41<H>  |  1.80<H>    Ca    9.3      28 May 2024 06:16  Mg     2.8         TPro  5.8<L>  /  Alb  2.1<L>  /  TBili  0.4  /  DBili  x   /  AST  35  /  ALT  45  /  AlkPhos  130<H>  -    ABG - ( 26 May 2024 16:24 )  pH, Arterial: 7.44  pH, Blood: x     /  pCO2: 65    /  pO2: 89    / HCO3: 44    / Base Excess: 19.9  /  SaO2: 98.5              CARDIAC MARKERS ( 26 May 2024 17:10 )  x     / x     / x     / x     / 1.7 ng/mL        Review of Systems	  ams  weakness  ataxic gait      Objective     Physical Examination    heart s1s2  lung dc BS  head nc  head at  weakness  frailty      Pertinent Lab findings & Imaging      Eliecer:  NO   Adequate UO     I&O's Detail    27 May 2024 07:01  -  28 May 2024 07:00  --------------------------------------------------------  IN:  Total IN: 0 mL    OUT:    Voided (mL): 1700 mL  Total OUT: 1700 mL    Total NET: -1700 mL               Discussed with:     Cultures:	        Radiology      ACC: [id number] EXAM:  MR BRAIN   ORDERED BY: BERNADINE VALLE     PROCEDURE DATE:  2024          INTERPRETATION:  CLINICAL INDICATIONS: CVA    COMPARISON: Head CT dated 2024. MRI brain dated 2024    TECHNIQUE: MRI brain: Multiplanar, multisequence MR imaging of the brain   are obtained without the administration of intravenous Gadavist contrast.    FINDINGS:    There are improving areas of abnormal restricted diffusion involving the   posterior left frontal parietal lobe, right frontal lobe, bilateral   occipital lobes, bilateral cerebral hemispheres compatible with subacute   infarctions. Hemosiderin deposition/petechial hemorrhage in the left   parietal lobe on image 33 of series 6. New posterior left corona   radiata/frontal lobe infarct on image 18 of series 3.    Left parietal convexity extra-axial lesion measuring 1.9 x 1.3 x 1.7 cm   suggesting a meningioma. Scattered periventricular and subcortical white   matter T2 /FLAIR hyperintensities are seen without mass effect,   nonspecific, likely representing mild chronic microvascular changes.   Normal T2 flow-voids are seen within  the intracranial vasculature. The   lateral ventricles and cortical sulci are age-appropriate in size and   configuration. There is no mass, mass effect, or extra-axial fluid   collection.    Multiple tiny foci of abnormal susceptibility artifacts in the bilateral   corona radiata, bilateral occipital lobes, bilateral cerebral hemisphere   suggesting microhemorrhages. Midline structures are normal.  The patient   is status post bilateral ocular lens replacement surgery. Mild   inflammatory mucosal changes are seen throughout the various portions of   the paranasal sinuses. The orbits and mastoid air cells are unremarkable.   .      IMPRESSION: New posterior left corona radiata/frontal lobe infarct.    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   Petechial hemorrhage in the left parietal lobe. Bilateral areas of recent   microhemorrhages.    --- End of Report ---            TEODORO BENITEZ MD; Attending Radiologist  This document has been electronically signed. May 28 2024 12:31PM

## 2024-05-29 NOTE — PROGRESS NOTE PEDS - PROBLEM SELECTOR PLAN 2
- New bilateral CVA's, hx Left M3 infarct  - CT Head No Con: 1.  Gyriform hyperdensity in the region of a left frontal parietal subacute infarct suggesting cortical laminar necrosis or hemorrhage. 2.  Small subacute high right frontal infarct. Subacute bilateral cerebellar infarcts.  - Out of window for thrombolytic therapy  - Heparin gtt  - repeat CT stable.   -MR head: New posterior left corona radiata/frontal lobe infarct.  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 Petechial hemorrhage in the left parietal lobe. Bilateral areas of recent  microhemorrhages.  -AC being held as pt found to have microhemorrhages on MR - will need repeat CT head before resuming AC and or antiplatelets  - Neuro check q4h  - C/w high-dose statin  - Dr. Underwood Neurology consulted

## 2024-05-29 NOTE — CONSULT NOTE ADULT - ASSESSMENT
83yo M PMHx AF (on Eliquis), CAD (s/p multiple stents), HTN, DM, HLD, COPD, OM (L Femur, on Cipro), recently admitted to [facility] for RVR, hospital course complicated by new L M3 CVA, PEG tube placed 5/15, subsequently discharged to [facility]. Now presenting from rehab and admitted for new AMS, new B/L ischemic strokes.   # AMS  # Acute on chronic hypercapnic respiratory failure  # CVA    Recommendations:   - Reassessed intermittently  - Hypercapnia improving on BiPAP, would recommend continuing with BiPAP overnight  - Bronchospasm resolved after bronchodilators and steroids, continue with PRN RHIANNON and standing bronchodilators/inhaled corticosteroids/leukotriene receptor antagonist  - Mental status not fully resolved. Continue to monitor  - Would recommend aspiration precautions  - Patient DNR/DNI  - Overall prognosis poor.    At this time, the patient does not meet criteria for ICU admission. Please reconsult if his condition changes.     Discussed with Dr. Sidhu.    Critical Care Time (EXCLUSIVE of any non bundled procedures) :  30 minutes were spent assessing the patient's presenting problems of acute illness that pose a high probability of life threatening  deterioration or end organ damage / dysfunction.  Medical decision making includes initiation / continuation of plan or care review data/ labwork/ radiographic study, direct patient care at bedside, discussions with consultants regarding care, evaluation and interpretation of vital signs, any necessary ventilator management, NIV or BIPAP changes or initiation, discussions with multidisciplinary team,  am or pm rounds, discussions of goals of care with patient and family, all non-inclusive of procedures.    Date of entry of this note is equal to the date of services rendered

## 2024-05-29 NOTE — PROGRESS NOTE PEDS - TIME BILLING
pt seen and examine today see above plan - Severe sepsis poa / sever hypernatremia   - Possible source from L Femur OM, as patient's cipro was held at Rehab since prior discharge for unknown reason  and -Other possible source is aspiration PNA  with acute hypoxic hypercarbic respiratory failure with  recurrent cva / with hx afib  back on  on full ac Eliquis    ct head  repeat  Stable small to moderate subacute posterior left frontal infarct with mild associated petechial hemorrhage / ok to start blood thinner as per neuro danyelle.

## 2024-05-29 NOTE — PROGRESS NOTE ADULT - PROBLEM SELECTOR PLAN 4
will follow  Francia Banerjee MD, [facility]; Palliative Care Attending, Ethicist. [phone]

## 2024-05-29 NOTE — SOCIAL WORK PROGRESS NOTE - NSSWPROGRESSNOTE_GEN_ALL_CORE
Discussed at daily rounds. Informed patient declining and having respiratory issues. Palliative MD met with wife and later met with sons and they were waiting to make decision re. comfort care. I met with wife briefly and she was Discussed at daily rounds. Informed patient declining and having respiratory issues. Palliative MD met with wife and later met with sons and they were waiting to make decision re. comfort care. I met with wife briefly and she was appropriately upset. Support provided and she was encouraged to contact social work as needed. Social work to remain available.

## 2024-05-29 NOTE — PROGRESS NOTE PEDS - SUBJECTIVE AND OBJECTIVE BOX
Patient is a 84y old  Male who presents with a chief complaint of AMS, CVA (29 May 2024 05:21)      INTERVAL HPI/OVERNIGHT EVENTS: pt seen and examined at bedside. no overnight events. pt does not respond to verbal stimuli or sternal rub.     MEDICATIONS  (STANDING):  acetaminophen     Tablet .. 650 milliGRAM(s) Oral every 6 hours  albuterol    90 MICROgram(s) HFA Inhaler 2 Puff(s) Inhalation every 6 hours  aMIOdarone    Tablet 200 milliGRAM(s) Oral daily  atorvastatin 80 milliGRAM(s) Oral at bedtime  budesonide 160 MICROgram(s)/formoterol 4.5 MICROgram(s) Inhaler 2 Puff(s) Inhalation two times a day  dextrose 10% Bolus 125 milliLiter(s) IV Bolus once  dextrose 5%. 1000 milliLiter(s) (100 mL/Hr) IV Continuous <Continuous>  dextrose 5%. 1000 milliLiter(s) (50 mL/Hr) IV Continuous <Continuous>  dextrose 5%. 1000 milliLiter(s) (75 mL/Hr) IV Continuous <Continuous>  dextrose 50% Injectable 12.5 Gram(s) IV Push once  dextrose 50% Injectable 25 Gram(s) IV Push once  glucagon  Injectable 1 milliGRAM(s) IntraMuscular once  insulin glargine Injectable (LANTUS) 30 Unit(s) SubCutaneous at bedtime  insulin lispro (ADMELOG) corrective regimen sliding scale   SubCutaneous <User Schedule>  magnesium oxide 400 milliGRAM(s) Oral daily  metoprolol tartrate 25 milliGRAM(s) Oral two times a day  montelukast 10 milliGRAM(s) Oral at bedtime  piperacillin/tazobactam IVPB.. 3.375 Gram(s) IV Intermittent every 8 hours  tiotropium 2.5 MICROgram(s) Inhaler 2 Puff(s) Inhalation daily  zinc sulfate 220 milliGRAM(s) Oral daily    MEDICATIONS  (PRN):  dextrose Oral Gel 15 Gram(s) Oral once PRN Blood Glucose LESS THAN 70 milliGRAM(s)/deciliter      Allergies    No Known Allergies    Intolerances        REVIEW OF SYSTEMS:  unable to assess 2/2 ams    Vital Signs Last 24 Hrs  T(C): 37.3 (29 May 2024 05:34), Max: 37.8 (28 May 2024 18:15)  T(F): 99.2 (29 May 2024 05:34), Max: 100 (28 May 2024 18:15)  HR: 89 (29 May 2024 05:34) (80 - 100)  BP: 148/79 (29 May 2024 05:34) (103/66 - 148/79)  BP(mean): --  RR: 19 (29 May 2024 05:34) (19 - 20)  SpO2: 91% (29 May 2024 05:34) (91% - 99%)    Parameters below as of 29 May 2024 05:34  Patient On (Oxygen Delivery Method): nasal cannula  O2 Flow (L/min): 4      PHYSICAL EXAM:  GENERAL: ill-appearing, elderly male, does not respond to painful or verbal stimuli  HEENT:  NC/AT, unable to assess EOM, anicteric, dry mucous membranes  CHEST/LUNG:  Coarse breath sounds bilaterally  HEART:  RRR, S1, S2  ABDOMEN:  BS+, soft, nontender, nondistended. PEG tube c/d/i  MUSCULOSKELETAL: no edema, cyanosis, or calf tenderness  NEUROLOGIC: AAOX0. Does not respond to commands for strength / neuro assessment.        LABS:    CBC Full  -  ( 28 May 2024 06:16 )  WBC Count : 15.71 K/uL  Hemoglobin : 9.7 g/dL  Hematocrit : 34.6 %  Platelet Count - Automated : 195 K/uL  Mean Cell Volume : 96.4 fl  Mean Cell Hemoglobin : 27.0 pg  Mean Cell Hemoglobin Concentration : 28.0 gm/dL  Auto Neutrophil # : x  Auto Lymphocyte # : x  Auto Monocyte # : x  Auto Eosinophil # : x  Auto Basophil # : x  Auto Neutrophil % : x  Auto Lymphocyte % : x  Auto Monocyte % : x  Auto Eosinophil % : x  Auto Basophil % : x      Ca    9.3        28 May 2024 06:16      PTT - ( 28 May 2024 14:10 )  PTT:43.8 sec  Urinalysis Basic - ( 28 May 2024 06:16 )    Color: x / Appearance: x / SG: x / pH: x  Gluc: 282 mg/dL / Ketone: x  / Bili: x / Urobili: x   Blood: x / Protein: x / Nitrite: x   Leuk Esterase: x / RBC: x / WBC x   Sq Epi: x / Non Sq Epi: x / Bacteria: x      CAPILLARY BLOOD GLUCOSE      POCT Blood Glucose.: 280 mg/dL (29 May 2024 04:35)  POCT Blood Glucose.: 309 mg/dL (28 May 2024 21:48)  POCT Blood Glucose.: 292 mg/dL (28 May 2024 18:12)  POCT Blood Glucose.: 290 mg/dL (28 May 2024 17:03)  POCT Blood Glucose.: 175 mg/dL (28 May 2024 12:55)        Culture - Urine (collected 05-26-24 @ 17:49)  Source: Catheterized Catheterized  Final Report (05-27-24 @ 16:16):    No growth    Culture - Blood (collected 05-26-24 @ 17:00)  Source: .Blood Blood-Peripheral  Preliminary Report (05-28-24 @ 22:02):    No growth at 48 Hours    Culture - Blood (collected 05-26-24 @ 16:45)  Source: .Blood Blood-Peripheral  Preliminary Report (05-28-24 @ 22:02):    No growth at 48 Hours        RADIOLOGY & ADDITIONAL TESTS:    Personally reviewed.     Consultant(s) Notes Reviewed:  [x] YES  [ ] NO     Patient is a 84y old  Male who presents with a chief complaint of AMS, CVA (29 May 2024 05:21)      INTERVAL HPI/OVERNIGHT EVENTS: pt seen and examined at bedside. no overnight events. pt does not respond to verbal stimuli or sternal rub.     MEDICATIONS  (STANDING):  acetaminophen     Tablet .. 650 milliGRAM(s) Oral every 6 hours  albuterol    90 MICROgram(s) HFA Inhaler 2 Puff(s) Inhalation every 6 hours  aMIOdarone    Tablet 200 milliGRAM(s) Oral daily  atorvastatin 80 milliGRAM(s) Oral at bedtime  budesonide 160 MICROgram(s)/formoterol 4.5 MICROgram(s) Inhaler 2 Puff(s) Inhalation two times a day  dextrose 10% Bolus 125 milliLiter(s) IV Bolus once  dextrose 5%. 1000 milliLiter(s) (100 mL/Hr) IV Continuous <Continuous>  dextrose 5%. 1000 milliLiter(s) (50 mL/Hr) IV Continuous <Continuous>  dextrose 5%. 1000 milliLiter(s) (75 mL/Hr) IV Continuous <Continuous>  dextrose 50% Injectable 12.5 Gram(s) IV Push once  dextrose 50% Injectable 25 Gram(s) IV Push once  glucagon  Injectable 1 milliGRAM(s) IntraMuscular once  insulin glargine Injectable (LANTUS) 30 Unit(s) SubCutaneous at bedtime  insulin lispro (ADMELOG) corrective regimen sliding scale   SubCutaneous <User Schedule>  magnesium oxide 400 milliGRAM(s) Oral daily  metoprolol tartrate 25 milliGRAM(s) Oral two times a day  montelukast 10 milliGRAM(s) Oral at bedtime  piperacillin/tazobactam IVPB.. 3.375 Gram(s) IV Intermittent every 8 hours  tiotropium 2.5 MICROgram(s) Inhaler 2 Puff(s) Inhalation daily  zinc sulfate 220 milliGRAM(s) Oral daily    MEDICATIONS  (PRN):  dextrose Oral Gel 15 Gram(s) Oral once PRN Blood Glucose LESS THAN 70 milliGRAM(s)/deciliter      Allergies    No Known Allergies    Intolerances        REVIEW OF SYSTEMS:  unable to assess 2/2 ams    Vital Signs Last 24 Hrs  T(C): 37.3 (29 May 2024 05:34), Max: 37.8 (28 May 2024 18:15)  T(F): 99.2 (29 May 2024 05:34), Max: 100 (28 May 2024 18:15)  HR: 89 (29 May 2024 05:34) (80 - 100)  BP: 148/79 (29 May 2024 05:34) (103/66 - 148/79)  BP(mean): --  RR: 19 (29 May 2024 05:34) (19 - 20)  SpO2: 91% (29 May 2024 05:34) (91% - 99%)    Parameters below as of 29 May 2024 05:34  Patient On (Oxygen Delivery Method): nasal cannula  O2 Flow (L/min): 4      PHYSICAL EXAM:  GENERAL: ill-appearing, elderly male, does not respond to painful or verbal stimuli  HEENT:  NC/AT, unable to assess EOM, anicteric, dry mucous membranes  CHEST/LUNG:  Coarse breath sounds bilaterally  HEART:  RRR, S1, S2  ABDOMEN:  BS+, soft, nontender, nondistended. PEG tube c/d/i  MUSCULOSKELETAL: no edema, cyanosis, or calf tenderness  NEUROLOGIC: AAOX0. Does not respond to commands for strength / neuro assessment.     gu  extcath   LABS:    CBC Full  -  ( 28 May 2024 06:16 )  WBC Count : 15.71 K/uL  Hemoglobin : 9.7 g/dL  Hematocrit : 34.6 %  Platelet Count - Automated : 195 K/uL  Mean Cell Volume : 96.4 fl  Mean Cell Hemoglobin : 27.0 pg  Mean Cell Hemoglobin Concentration : 28.0 gm/dL  Auto Neutrophil # : x  Auto Lymphocyte # : x  Auto Monocyte # : x  Auto Eosinophil # : x  Auto Basophil # : x  Auto Neutrophil % : x  Auto Lymphocyte % : x  Auto Monocyte % : x  Auto Eosinophil % : x  Auto Basophil % : x      Ca    9.3        28 May 2024 06:16      PTT - ( 28 May 2024 14:10 )  PTT:43.8 sec  Urinalysis Basic - ( 28 May 2024 06:16 )    Color: x / Appearance: x / SG: x / pH: x  Gluc: 282 mg/dL / Ketone: x  / Bili: x / Urobili: x   Blood: x / Protein: x / Nitrite: x   Leuk Esterase: x / RBC: x / WBC x   Sq Epi: x / Non Sq Epi: x / Bacteria: x      CAPILLARY BLOOD GLUCOSE      POCT Blood Glucose.: 280 mg/dL (29 May 2024 04:35)  POCT Blood Glucose.: 309 mg/dL (28 May 2024 21:48)  POCT Blood Glucose.: 292 mg/dL (28 May 2024 18:12)  POCT Blood Glucose.: 290 mg/dL (28 May 2024 17:03)  POCT Blood Glucose.: 175 mg/dL (28 May 2024 12:55)        Culture - Urine (collected 05-26-24 @ 17:49)  Source: Catheterized Catheterized  Final Report (05-27-24 @ 16:16):    No growth    Culture - Blood (collected 05-26-24 @ 17:00)  Source: .Blood Blood-Peripheral  Preliminary Report (05-28-24 @ 22:02):    No growth at 48 Hours    Culture - Blood (collected 05-26-24 @ 16:45)  Source: .Blood Blood-Peripheral  Preliminary Report (05-28-24 @ 22:02):    No growth at 48 Hours        RADIOLOGY & ADDITIONAL TESTS:    Personally reviewed.     Consultant(s) Notes Reviewed:  [x] YES  [ ] NO

## 2024-05-29 NOTE — PROGRESS NOTE ADULT - SUBJECTIVE AND OBJECTIVE BOX
Indication for Geriatrics and Palliative Care Services/INTERVAL HPI:  SUBJECTIVE AND OBJECTIVE:    OVERNIGHT EVENTS:    DNR on chart:DNI  DNI      Allergies    No Known Allergies    Intolerances    MEDICATIONS  (STANDING):  acetaminophen     Tablet .. 650 milliGRAM(s) Oral every 6 hours  albuterol Continuous Nebulization (Vibrating Mesh Nebulizer) 5 mG/Hr (2 mL/Hr) Continuous Inhalation. <Continuous>  aMIOdarone    Tablet 200 milliGRAM(s) Oral daily  atorvastatin 80 milliGRAM(s) Oral at bedtime  budesonide 160 MICROgram(s)/formoterol 4.5 MICROgram(s) Inhaler 2 Puff(s) Inhalation two times a day  dexAMETHasone  IVPB 10 milliGRAM(s) IV Intermittent once  dextrose 10% Bolus 125 milliLiter(s) IV Bolus once  dextrose 5%. 1000 milliLiter(s) (100 mL/Hr) IV Continuous <Continuous>  dextrose 5%. 1000 milliLiter(s) (75 mL/Hr) IV Continuous <Continuous>  dextrose 5%. 1000 milliLiter(s) (50 mL/Hr) IV Continuous <Continuous>  dextrose 50% Injectable 12.5 Gram(s) IV Push once  dextrose 50% Injectable 25 Gram(s) IV Push once  glucagon  Injectable 1 milliGRAM(s) IntraMuscular once  insulin glargine Injectable (LANTUS) 36 Unit(s) SubCutaneous at bedtime  insulin lispro (ADMELOG) corrective regimen sliding scale   SubCutaneous <User Schedule>  magnesium oxide 400 milliGRAM(s) Oral daily  metoprolol tartrate 25 milliGRAM(s) Oral two times a day  montelukast 10 milliGRAM(s) Oral at bedtime  piperacillin/tazobactam IVPB.. 3.375 Gram(s) IV Intermittent every 8 hours  tiotropium 2.5 MICROgram(s) Inhaler 2 Puff(s) Inhalation daily  zinc sulfate 220 milliGRAM(s) Oral daily    MEDICATIONS  (PRN):  dextrose Oral Gel 15 Gram(s) Oral once PRN Blood Glucose LESS THAN 70 milliGRAM(s)/deciliter      ITEMS UNCHECKED ARE NOT PRESENT    PRESENT SYMPTOMS: [ ]Unable to self-report - see [ ] CPOT [ ] PAINADS [ ] RDOS  Source if other than patient:  [ ]Family   [ ]Team     Pain:  [ ]yes [ ]no  QOL impact -   Location -                    Aggravating factors -  Quality -  Radiation -  Timing-  Severity (0-10 scale):  Minimal acceptable level (0-10 scale):     CPOT:    https://www.[facility].org/getattachment/[email or web address]/Critical-Care-Pain-Observation-Tool-(CPOT)    Dyspnea:                           [ ]Mild [ ]Moderate [ ]Severe  Anxiety:                             [ ]Mild [ ]Moderate [ ]Severe  Fatigue:                             [ ]Mild [ ]Moderate [ ]Severe  Nausea:                             [ ]Mild [ ]Moderate [ ]Severe  Loss of appetite:              [ ]Mild [ ]Moderate [ ]Severe  Constipation:                    [ ]Mild [ ]Moderate [ ]Severe    PCSSQ[Palliative Care Spiritual Screening Question]   Severity (0-10):  Score of 4 or > indicate consideration of Chaplaincy referral.  Chaplaincy Referral: [ ] yes [ ] refused [ ] following [ ] Deferred     Caregiver [place]? : [ ] yes [ ] no [ ] Deferred [ ] Declined             Social work referral [ ] Patient & Family Centered Care Referral [ ]     Anticipatory Grief present?:  [ ] yes [ ] no  [ ] Deferred                  Social work referral [ ] Chaplaincy Referral[ ]      Other Symptoms:  [ ]All other review of systems negative   [ ]Unable to obtain due to poor mentation    Palliative Performance Status Version 2:         %      http://npcrc.org/files/news/palliative_performance_scale_ppsv2.pdf  PHYSICAL EXAM:  Vital Signs Last 24 Hrs  T(C): 37.3 (29 May 2024 05:34), Max: 37.8 (28 May 2024 18:15)  T(F): 99.2 (29 May 2024 05:34), Max: 100 (28 May 2024 18:15)  HR: 83 (29 May 2024 14:07) (83 - 100)  BP: 148/79 (29 May 2024 05:34) (103/66 - 148/79)  BP(mean): --  RR: 19 (29 May 2024 05:34) (19 - 20)  SpO2: 98% (29 May 2024 14:07) (91% - 98%)    Parameters below as of 29 May 2024 05:34  Patient On (Oxygen Delivery Method): nasal cannula  O2 Flow (L/min): 4   I&O's Summary    28 May 2024 07:01  -  29 May 2024 07:00  --------------------------------------------------------  IN: 1505 mL / OUT: 0 mL / NET: 1505 mL       GENERAL: [ ]Cachexia    [ ]Alert  [ ]Oriented x   [ ]Lethargic  [ ]Unarousable  [ ]Verbal  [ ]Non-Verbal  Behavioral:   [ ]Anxiety  [ ]Delirium [ ]Agitation [ ]Other  HEENT:  [ ]Normal   [ ]Dry mouth   [ ]ET Tube/Trach  [ ]Oral lesions  PULMONARY:   [ ]Clear [ ]Tachypnea  [ ]Audible excessive secretions   [ ]Rhonchi        [ ]Right [ ]Left [ ]Bilateral  [ ]Crackles        [ ]Right [ ]Left [ ]Bilateral  [ ]Wheezing     [ ]Right [ ]Left [ ]Bilateral  [ ]Diminished BS [ ] Right [ ]Left [ ]Bilateral  CARDIOVASCULAR:    [ ]Regular [ ]Irregular [ ]Tachy  [ ]Dexter [ ]Murmur [ ]Other  GASTROINTESTINAL:  [ ]Soft  [ ]Distended   [ ]+BS  [ ]Non tender [ ]Tender  [ ]Other [ ]PEG [ ]OGT/ NGT   Last BM:   GENITOURINARY:  [ ]Normal [ ]Incontinent   [ ]Oliguria/Anuria   [ ]Gonzáles  MUSCULOSKELETAL:   [ ]Normal   [ ]Weakness  [ ]Bed/Wheelchair bound [ ]Edema  NEUROLOGIC:   [ ]No focal deficits  [ ] Cognitive impairment  [ ] Dysphagia [ ]Dysarthria [ ] Paresis [ ]Other   SKIN:   [ ]Normal  [ ]Rash  [ ]Other  [ ]Pressure ulcer(s) [ ]y [ ]n present on admission    CRITICAL CARE:  [ ]Shock Present  [ ]Septic [ ]Cardiogenic [ ]Neurologic [ ]Hypovolemic  [ ]Vasopressors [ ]Inotropes  [ ]Respiratory failure present [ ]Mechanical Ventilation [ ]Non-invasive ventilatory support [ ]High-Flow   [ ]Acute  [ ]Chronic [ ]Hypoxic  [ ]Hypercarbic [ ]Other  [ ]Other organ failure     LABS:                        9.0    17.88 )-----------( 196      ( 29 May 2024 11:00 )             32.5   05-29    149<H>  |  110<H>  |  48<H>  ----------------------------<  356<H>  4.5   |  43<H>  |  1.70<H>    Ca    8.8      29 May 2024 11:00  Phos  2.8     05-29  Mg     2.7     05-29    TPro  5.9<L>  /  Alb  2.0<L>  /  TBili  0.4  /  DBili  x   /  AST  72<H>  /  ALT  90<H>  /  AlkPhos  167<H>  05-29  PTT - ( 28 May 2024 14:10 )  PTT:43.8 sec    Urinalysis Basic - ( 29 May 2024 11:00 )    Color: x / Appearance: x / SG: x / pH: x  Gluc: 356 mg/dL / Ketone: x  / Bili: x / Urobili: x   Blood: x / Protein: x / Nitrite: x   Leuk Esterase: x / RBC: x / WBC x   Sq Epi: x / Non Sq Epi: x / Bacteria: x      RADIOLOGY & ADDITIONAL STUDIES:    Protein Calorie Malnutrition Present: [ ]mild [ ]moderate [ ]severe [ ]underweight [ ]morbid obesity  https://www.andeal.org/vault/2440/web/files/ONC/Table_Clinical%20Characteristics%20to%20Document%20Malnutrition-White%20JV%20et%20al%202012.pdf    Height (cm): 180.3 (05-26-24 @ 15:38), 180.3 (04-27-24 @ 23:25), 180.3 (11-14-23 @ 14:15)  Weight (kg): 99.8 (05-26-24 @ 15:38), 101 (04-27-24 @ 23:25), 102.1 (11-14-23 @ 14:15)  BMI (kg/m2): 30.7 (05-26-24 @ 15:38), 31.1 (04-27-24 @ 23:25), 31.4 (11-14-23 @ 14:15)    [ ]PPSV2 < or = 30%  [ ]significant weight loss [ ]poor nutritional intake [ ]anasarca[ ]Artificial Nutrition    Other REFERRALS:  [ ]Hospice  [ ]Child Life  [ ]Social Work  [ ]Case management [ ]Holistic Therapy        Indication for Geriatrics and Palliative Care Services/INTERVAL HPI: goals of care  SUBJECTIVE AND OBJECTIVE: Patient seen and examined; lethargic today, now with worsening respiratory failure. goc held with daughter and sons    OVERNIGHT EVENTS: no acute overnight events    DNR on chart:DNI  DNI      Allergies    No Known Allergies    Intolerances    MEDICATIONS  (STANDING):  acetaminophen     Tablet .. 650 milliGRAM(s) Oral every 6 hours  albuterol Continuous Nebulization (Vibrating Mesh Nebulizer) 5 mG/Hr (2 mL/Hr) Continuous Inhalation. <Continuous>  aMIOdarone    Tablet 200 milliGRAM(s) Oral daily  atorvastatin 80 milliGRAM(s) Oral at bedtime  budesonide 160 MICROgram(s)/formoterol 4.5 MICROgram(s) Inhaler 2 Puff(s) Inhalation two times a day  dexAMETHasone  IVPB 10 milliGRAM(s) IV Intermittent once  dextrose 10% Bolus 125 milliLiter(s) IV Bolus once  dextrose 5%. 1000 milliLiter(s) (100 mL/Hr) IV Continuous <Continuous>  dextrose 5%. 1000 milliLiter(s) (75 mL/Hr) IV Continuous <Continuous>  dextrose 5%. 1000 milliLiter(s) (50 mL/Hr) IV Continuous <Continuous>  dextrose 50% Injectable 12.5 Gram(s) IV Push once  dextrose 50% Injectable 25 Gram(s) IV Push once  glucagon  Injectable 1 milliGRAM(s) IntraMuscular once  insulin glargine Injectable (LANTUS) 36 Unit(s) SubCutaneous at bedtime  insulin lispro (ADMELOG) corrective regimen sliding scale   SubCutaneous <User Schedule>  magnesium oxide 400 milliGRAM(s) Oral daily  metoprolol tartrate 25 milliGRAM(s) Oral two times a day  montelukast 10 milliGRAM(s) Oral at bedtime  piperacillin/tazobactam IVPB.. 3.375 Gram(s) IV Intermittent every 8 hours  tiotropium 2.5 MICROgram(s) Inhaler 2 Puff(s) Inhalation daily  zinc sulfate 220 milliGRAM(s) Oral daily    MEDICATIONS  (PRN):  dextrose Oral Gel 15 Gram(s) Oral once PRN Blood Glucose LESS THAN 70 milliGRAM(s)/deciliter      ITEMS UNCHECKED ARE NOT PRESENT    PRESENT SYMPTOMS: [ ]Unable to self-report - see [ ] CPOT [ ] PAINADS [ ] RDOS  Source if other than patient:  [ ]Family   [ ]Team     Pain:  [ ]yes [ ]no  QOL impact -   Location -                    Aggravating factors -  Quality -  Radiation -  Timing-  Severity (0-10 scale):  Minimal acceptable level (0-10 scale):     CPOT:    https://www.[facility].org/getattachment/[email or web address]/Critical-Care-Pain-Observation-Tool-(CPOT)    Dyspnea:                           [ ]Mild [ ]Moderate [ ]Severe  Anxiety:                             [ ]Mild [ ]Moderate [ ]Severe  Fatigue:                             [ ]Mild [ ]Moderate [ ]Severe  Nausea:                             [ ]Mild [ ]Moderate [ ]Severe  Loss of appetite:              [ ]Mild [ ]Moderate [ ]Severe  Constipation:                    [ ]Mild [ ]Moderate [ ]Severe    PCSSQ[Palliative Care Spiritual Screening Question]   Severity (0-10):  Score of 4 or > indicate consideration of Chaplaincy referral.  Chaplaincy Referral: [ ] yes [ ] refused [ ] following [ ] Deferred     Caregiver [place]? : [ ] yes [ ] no [ ] Deferred [ ] Declined             Social work referral [ ] Patient & Family Centered Care Referral [ ]     Anticipatory Grief present?:  [ ] yes [ ] no  [ ] Deferred                  Social work referral [ ] Chaplaincy Referral[ ]      Other Symptoms:  [ ]All other review of systems negative   [ ]Unable to obtain due to poor mentation    Palliative Performance Status Version 2:         %      http://npcrc.org/files/news/palliative_performance_scale_ppsv2.pdf  PHYSICAL EXAM:  Vital Signs Last 24 Hrs  T(C): 37.3 (29 May 2024 05:34), Max: 37.8 (28 May 2024 18:15)  T(F): 99.2 (29 May 2024 05:34), Max: 100 (28 May 2024 18:15)  HR: 83 (29 May 2024 14:07) (83 - 100)  BP: 148/79 (29 May 2024 05:34) (103/66 - 148/79)  BP(mean): --  RR: 19 (29 May 2024 05:34) (19 - 20)  SpO2: 98% (29 May 2024 14:07) (91% - 98%)    Parameters below as of 29 May 2024 05:34  Patient On (Oxygen Delivery Method): nasal cannula  O2 Flow (L/min): 4   I&O's Summary    28 May 2024 07:01  -  29 May 2024 07:00  --------------------------------------------------------  IN: 1505 mL / OUT: 0 mL / NET: 1505 mL       GENERAL: [ ]Cachexia    [ ]Alert  [ ]Oriented x   [ ]Lethargic  [ ]Unarousable  [ ]Verbal  [ ]Non-Verbal  Behavioral:   [ ]Anxiety  [ ]Delirium [ ]Agitation [ ]Other  HEENT:  [ ]Normal   [ ]Dry mouth   [ ]ET Tube/Trach  [ ]Oral lesions  PULMONARY:   [ ]Clear [ ]Tachypnea  [ ]Audible excessive secretions   [ ]Rhonchi        [ ]Right [ ]Left [ ]Bilateral  [ ]Crackles        [ ]Right [ ]Left [ ]Bilateral  [ ]Wheezing     [ ]Right [ ]Left [ ]Bilateral  [ ]Diminished BS [ ] Right [ ]Left [ ]Bilateral  CARDIOVASCULAR:    [ ]Regular [ ]Irregular [ ]Tachy  [ ]Dexter [ ]Murmur [ ]Other  GASTROINTESTINAL:  [ ]Soft  [ ]Distended   [ ]+BS  [ ]Non tender [ ]Tender  [ ]Other [ ]PEG [ ]OGT/ NGT   Last BM:   GENITOURINARY:  [ ]Normal [ ]Incontinent   [ ]Oliguria/Anuria   [ ]Gonzáles  MUSCULOSKELETAL:   [ ]Normal   [ ]Weakness  [ ]Bed/Wheelchair bound [ ]Edema  NEUROLOGIC:   [ ]No focal deficits  [ ] Cognitive impairment  [ ] Dysphagia [ ]Dysarthria [ ] Paresis [ ]Other   SKIN:   [ ]Normal  [ ]Rash  [ ]Other  [ ]Pressure ulcer(s) [ ]y [ ]n present on admission    CRITICAL CARE:  [ ]Shock Present  [ ]Septic [ ]Cardiogenic [ ]Neurologic [ ]Hypovolemic  [ ]Vasopressors [ ]Inotropes  [ ]Respiratory failure present [ ]Mechanical Ventilation [ ]Non-invasive ventilatory support [ ]High-Flow   [ ]Acute  [ ]Chronic [ ]Hypoxic  [ ]Hypercarbic [ ]Other  [ ]Other organ failure     LABS:                        9.0    17.88 )-----------( 196      ( 29 May 2024 11:00 )             32.5   05-29    149<H>  |  110<H>  |  48<H>  ----------------------------<  356<H>  4.5   |  43<H>  |  1.70<H>    Ca    8.8      29 May 2024 11:00  Phos  2.8     05-29  Mg     2.7     05-29    TPro  5.9<L>  /  Alb  2.0<L>  /  TBili  0.4  /  DBili  x   /  AST  72<H>  /  ALT  90<H>  /  AlkPhos  167<H>  05-29  PTT - ( 28 May 2024 14:10 )  PTT:43.8 sec    Urinalysis Basic - ( 29 May 2024 11:00 )    Color: x / Appearance: x / SG: x / pH: x  Gluc: 356 mg/dL / Ketone: x  / Bili: x / Urobili: x   Blood: x / Protein: x / Nitrite: x   Leuk Esterase: x / RBC: x / WBC x   Sq Epi: x / Non Sq Epi: x / Bacteria: x      RADIOLOGY & ADDITIONAL STUDIES:    Protein Calorie Malnutrition Present: [ ]mild [ ]moderate [ ]severe [ ]underweight [ ]morbid obesity  https://www.andeal.org/vault/2440/web/files/ONC/Table_Clinical%20Characteristics%20to%20Document%20Malnutrition-White%20JV%20et%20al%202012.pdf    Height (cm): 180.3 (05-26-24 @ 15:38), 180.3 (04-27-24 @ 23:25), 180.3 (11-14-23 @ 14:15)  Weight (kg): 99.8 (05-26-24 @ 15:38), 101 (04-27-24 @ 23:25), 102.1 (11-14-23 @ 14:15)  BMI (kg/m2): 30.7 (05-26-24 @ 15:38), 31.1 (04-27-24 @ 23:25), 31.4 (11-14-23 @ 14:15)    [ ]PPSV2 < or = 30%  [ ]significant weight loss [ ]poor nutritional intake [ ]anasarca[ ]Artificial Nutrition    Other REFERRALS:  [ ]Hospice  [ ]Child Life  [ ]Social Work  [ ]Case management [ ]Holistic Therapy        Indication for Geriatrics and Palliative Care Services/INTERVAL HPI: goals of care  SUBJECTIVE AND OBJECTIVE: Patient seen and examined; lethargic today, now with worsening respiratory failure. goc held with daughter and sons    OVERNIGHT EVENTS: no acute overnight events    DNR on chart:DNI  DNI      Allergies    No Known Allergies    Intolerances    MEDICATIONS  (STANDING):  acetaminophen     Tablet .. 650 milliGRAM(s) Oral every 6 hours  albuterol Continuous Nebulization (Vibrating Mesh Nebulizer) 5 mG/Hr (2 mL/Hr) Continuous Inhalation. <Continuous>  aMIOdarone    Tablet 200 milliGRAM(s) Oral daily  atorvastatin 80 milliGRAM(s) Oral at bedtime  budesonide 160 MICROgram(s)/formoterol 4.5 MICROgram(s) Inhaler 2 Puff(s) Inhalation two times a day  dexAMETHasone  IVPB 10 milliGRAM(s) IV Intermittent once  dextrose 10% Bolus 125 milliLiter(s) IV Bolus once  dextrose 5%. 1000 milliLiter(s) (100 mL/Hr) IV Continuous <Continuous>  dextrose 5%. 1000 milliLiter(s) (75 mL/Hr) IV Continuous <Continuous>  dextrose 5%. 1000 milliLiter(s) (50 mL/Hr) IV Continuous <Continuous>  dextrose 50% Injectable 12.5 Gram(s) IV Push once  dextrose 50% Injectable 25 Gram(s) IV Push once  glucagon  Injectable 1 milliGRAM(s) IntraMuscular once  insulin glargine Injectable (LANTUS) 36 Unit(s) SubCutaneous at bedtime  insulin lispro (ADMELOG) corrective regimen sliding scale   SubCutaneous <User Schedule>  magnesium oxide 400 milliGRAM(s) Oral daily  metoprolol tartrate 25 milliGRAM(s) Oral two times a day  montelukast 10 milliGRAM(s) Oral at bedtime  piperacillin/tazobactam IVPB.. 3.375 Gram(s) IV Intermittent every 8 hours  tiotropium 2.5 MICROgram(s) Inhaler 2 Puff(s) Inhalation daily  zinc sulfate 220 milliGRAM(s) Oral daily    MEDICATIONS  (PRN):  dextrose Oral Gel 15 Gram(s) Oral once PRN Blood Glucose LESS THAN 70 milliGRAM(s)/deciliter      ITEMS UNCHECKED ARE NOT PRESENT    PRESENT SYMPTOMS: [x ]Unable to self-report - see [ ] CPOT [x ] PAINADS [ x] RDOS  Source if other than patient:  [ ]Family   [ ]Team     Pain:  [ ]yes [ ]no  QOL impact -   Location -                    Aggravating factors -  Quality -  Radiation -  Timing-  Severity (0-10 scale):  Minimal acceptable level (0-10 scale):     CPOT:    https://www.[facility].org/getattachment/[email or web address]/Critical-Care-Pain-Observation-Tool-(CPOT)    Dyspnea:                           [ ]Mild [ ]Moderate [ ]Severe  Anxiety:                             [ ]Mild [ ]Moderate [ ]Severe  Fatigue:                             [ ]Mild [ ]Moderate [ ]Severe  Nausea:                             [ ]Mild [ ]Moderate [ ]Severe  Loss of appetite:              [ ]Mild [ ]Moderate [ ]Severe  Constipation:                    [ ]Mild [ ]Moderate [ ]Severe    PCSSQ[Palliative Care Spiritual Screening Question]   Severity (0-10):  Score of 4 or > indicate consideration of Chaplaincy referral.  Chaplaincy Referral: [ ] yes [ ] refused [ ] following [ x] Deferred     Caregiver [place]? : [ ] yes [x ] no [ ] Deferred [ ] Declined             Social work referral [ ] Patient & Family Centered Care Referral [ ]     Anticipatory Grief present?:  [ ] yes [x ] no  [ ] Deferred                  Social work referral [ ] Chaplaincy Referral[ ]      Other Symptoms:  [ ]All other review of systems negative   [ x]Unable to obtain due to poor mentation    Palliative Performance Status Version 2:      10   %      http://npcrc.org/files/news/palliative_performance_scale_ppsv2.pdf  PHYSICAL EXAM:  Vital Signs Last 24 Hrs  T(C): 37.3 (29 May 2024 05:34), Max: 37.8 (28 May 2024 18:15)  T(F): 99.2 (29 May 2024 05:34), Max: 100 (28 May 2024 18:15)  HR: 83 (29 May 2024 14:07) (83 - 100)  BP: 148/79 (29 May 2024 05:34) (103/66 - 148/79)  BP(mean): --  RR: 19 (29 May 2024 05:34) (19 - 20)  SpO2: 98% (29 May 2024 14:07) (91% - 98%)    Parameters below as of 29 May 2024 05:34  Patient On (Oxygen Delivery Method): nasal cannula  O2 Flow (L/min): 4   I&O's Summary    28 May 2024 07:01  -  29 May 2024 07:00  --------------------------------------------------------  IN: 1505 mL / OUT: 0 mL / NET: 1505 mL       GENERAL: [ ]Cachexia    [ ]Alert  [ ]Oriented x   [x ]Lethargic  [ ]Unarousable  [ ]Verbal  [ ]Non-Verbal  Behavioral:   [ ]Anxiety  [ ]Delirium [ ]Agitation [ ]Other  HEENT:  [ ]Normal   [ ]Dry mouth   [ ]ET Tube/Trach  [ ]Oral lesions  PULMONARY:   [ ]Clear [x ]Tachypnea  [x ]Audible excessive secretions   [ ]Rhonchi        [ ]Right [ ]Left [ ]Bilateral  [ ]Crackles        [ ]Right [ ]Left [ ]Bilateral  [ ]Wheezing     [ ]Right [ ]Left [ ]Bilateral  [ ]Diminished BS [ ] Right [ ]Left [ ]Bilateral  CARDIOVASCULAR:    [x ]Regular [ ]Irregular [ ]Tachy  [ ]Dexter [ ]Murmur [ ]Other  GASTROINTESTINAL:  [ x]Soft  [ ]Distended   [x ]+BS  [ ]Non tender [ ]Tender  [ ]Other [x ]PEG [ ]OGT/ NGT   Last BM:   GENITOURINARY:  [ ]Normal [x ]Incontinent   [ ]Oliguria/Anuria   [ ]Gonzáles  MUSCULOSKELETAL:   [ ]Normal   [ x]Weakness  [ x]Bed/Wheelchair bound [ ]Edema  NEUROLOGIC:   [ ]No focal deficits  [ x] Cognitive impairment  [x ] Dysphagia [ ]Dysarthria [ ] Paresis [ ]Other   SKIN: chronic osteo  [ ]Normal  [ ]Rash  [x ]Other  [ ]Pressure ulcer(s) [ ]y [ ]n present on admission    CRITICAL CARE:  [ ]Shock Present  [ ]Septic [ ]Cardiogenic [ ]Neurologic [ ]Hypovolemic  [ ]Vasopressors [ ]Inotropes  [ ]Respiratory failure present [ ]Mechanical Ventilation [ ]Non-invasive ventilatory support [ ]High-Flow   [ ]Acute  [ ]Chronic [ ]Hypoxic  [ ]Hypercarbic [ ]Other  [ ]Other organ failure     LABS:                        9.0    17.88 )-----------( 196      ( 29 May 2024 11:00 )             32.5   05-29    149<H>  |  110<H>  |  48<H>  ----------------------------<  356<H>  4.5   |  43<H>  |  1.70<H>    Ca    8.8      29 May 2024 11:00  Phos  2.8     05-29  Mg     2.7     05-29    TPro  5.9<L>  /  Alb  2.0<L>  /  TBili  0.4  /  DBili  x   /  AST  72<H>  /  ALT  90<H>  /  AlkPhos  167<H>  05-29  PTT - ( 28 May 2024 14:10 )  PTT:43.8 sec    Urinalysis Basic - ( 29 May 2024 11:00 )    Color: x / Appearance: x / SG: x / pH: x  Gluc: 356 mg/dL / Ketone: x  / Bili: x / Urobili: x   Blood: x / Protein: x / Nitrite: x   Leuk Esterase: x / RBC: x / WBC x   Sq Epi: x / Non Sq Epi: x / Bacteria: x      RADIOLOGY & ADDITIONAL STUDIES:   < from: MR Head No Cont (05.28.24 @ 12:06) >    ACC: [id number] EXAM:  MR BRAIN   ORDERED BY: BERNADINE VALLE     PROCEDURE DATE:  05/28/2024          INTERPRETATION:  CLINICAL INDICATIONS: CVA    COMPARISON: Head CT dated 5/27/2024. MRI brain dated 4/27/2024    TECHNIQUE: MRI brain: Multiplanar, multisequence MR imaging of the brain   are obtained without the administration of intravenous Gadavist contrast.    FINDINGS:    There are improving areas of abnormal restricted diffusion involving the   posterior left frontal parietal lobe, right frontal lobe, bilateral   occipital lobes, bilateral cerebral hemispheres compatible with subacute   infarctions. Hemosiderin deposition/petechial hemorrhage in the left   parietal lobe on image 33 of series 6. New posterior left corona   radiata/frontal lobe infarct on image 18 of series 3.    Left parietal convexity extra-axial lesion measuring 1.9 x 1.3 x 1.7 cm   suggesting a meningioma. Scattered periventricular and subcortical white   matter T2 /FLAIR hyperintensities are seen without mass effect,   nonspecific, likely representing mild chronic microvascular changes.   Normal T2 flow-voids are seen within  the intracranial vasculature. The   lateral ventricles and cortical sulci are age-appropriate in size and   configuration. There is no mass, mass effect, or extra-axial fluid   collection.    Multiple tiny foci of abnormal susceptibility artifacts in the bilateral   corona radiata, bilateral occipital lobes, bilateral cerebral hemisphere   suggesting microhemorrhages. Midline structures are normal.  The patient   is status post bilateral ocular lens replacement surgery. Mild   inflammatory mucosal changes are seen throughout the various portions of   the paranasal sinuses. The orbits and mastoid air cells are unremarkable.   .      IMPRESSION: New posterior left corona radiata/frontal lobe infarct.    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   Petechial hemorrhage in the left parietal lobe. Bilateral areas of recent   microhemorrhages.    --- End of Report ---            TEODORO BENITEZ MD; Attending Radiologist  This document has been electronically signed. May 28 2024 12:31PM    < end of copied text >      Protein Calorie Malnutrition Present: [ ]mild [ ]moderate [ ]severe [ ]underweight [ ]morbid obesity  https://www.andeal.org/vault/2440/web/files/ONC/Table_Clinical%20Characteristics%20to%20Document%20Malnutrition-White%20JV%20et%20al%929378.pdf    Height (cm): 180.3 (05-26-24 @ 15:38), 180.3 (04-27-24 @ 23:25), 180.3 (11-14-23 @ 14:15)  Weight (kg): 99.8 (05-26-24 @ 15:38), 101 (04-27-24 @ 23:25), 102.1 (11-14-23 @ 14:15)  BMI (kg/m2): 30.7 (05-26-24 @ 15:38), 31.1 (04-27-24 @ 23:25), 31.4 (11-14-23 @ 14:15)    [ x]PPSV2 < or = 30%  [ ]significant weight loss [ x]poor nutritional intake [ ]anasarca[ x]Artificial Nutrition    Other REFERRALS:  [ ]Hospice  [ ]Child Life  [ x]Social Work  [ ]Case management [ ]Holistic Therapy

## 2024-05-29 NOTE — PROGRESS NOTE PEDS - PROBLEM SELECTOR PLAN 3
Cr baseline appears to be 1.3 per chart review   -Likely pre-renal 2/2 dec PO intake  -Pt remains on d5 IVF   -on tube feeds with free water

## 2024-05-29 NOTE — CONSULT NOTE ADULT - SUBJECTIVE AND OBJECTIVE BOX
Follow up note on prior consult note, same date      BRIEF HOSPITAL COURSE:  85yo M PMHx AF (on Eliquis), CAD (s/p multiple stents), HTN, DM, HLD, COPD, OM (L Femur, on Cipro), recently admitted to hospitals for RVR, hospital course complicated by new L M3 CVA, PEG tube placed 5/15, subsequently discharged to [facility]. Now presenting from rehab and admitted for new AMS, new B/L ischemic strokes.    ICU consulted today for acute on chronic hypercapnic respiratory failure    - Placed on Bipap with improvement in hypercapnia   - Improvement in bronchospasm with bronchodilators     PAST MEDICAL & SURGICAL HISTORY:  Diabetes Mellitus, Type II      CAD (Coronary Artery Disease)  s/p 3v CABG ; stents [place]       Dyslipidemia      Osteomyelitis      COPD (chronic obstructive pulmonary disease)  on 2L at home and BiPAP at night; intubated       Hypertension      PVD (peripheral vascular disease)      History of PAT (paroxysmal atrial tachycardia)      Asthma with COPD      BPH (benign prostatic hyperplasia)      Acute osteomyelitis      CABG (Coronary Artery Bypass Graft)        Compound fracture  left leg      S/P primary angioplasty with coronary stent      H/O drainage of abscess  Left femur 2021        Allergies    No Known Allergies    Intolerances      FAMILY HISTORY:  Family history of diabetes mellitus (Sibling)    Family hx of lung cancer  brother,  age 82, used to smoke with pt        Review of Systems:  Unable to obtain due to altered mental status     Medications:  piperacillin/tazobactam IVPB.. 3.375 Gram(s) IV Intermittent every 8 hours    aMIOdarone    Tablet 200 milliGRAM(s) Oral daily  metolazone 2.5 milliGRAM(s) Oral daily  metoprolol tartrate 25 milliGRAM(s) Oral two times a day    albuterol    0.083% 2.5 milliGRAM(s) Nebulizer every 2 hours PRN  budesonide 160 MICROgram(s)/formoterol 4.5 MICROgram(s) Inhaler 2 Puff(s) Inhalation two times a day  montelukast 10 milliGRAM(s) Oral at bedtime  tiotropium 2.5 MICROgram(s) Inhaler 2 Puff(s) Inhalation daily    acetaminophen     Tablet .. 650 milliGRAM(s) Oral every 6 hours      apixaban 2.5 milliGRAM(s) Oral every 12 hours        atorvastatin 80 milliGRAM(s) Oral at bedtime  dextrose 50% Injectable 12.5 Gram(s) IV Push once  dextrose 50% Injectable 25 Gram(s) IV Push once  dextrose Oral Gel 15 Gram(s) Oral once PRN  glucagon  Injectable 1 milliGRAM(s) IntraMuscular once  insulin glargine Injectable (LANTUS) 36 Unit(s) SubCutaneous at bedtime  insulin lispro (ADMELOG) corrective regimen sliding scale   SubCutaneous <User Schedule>    dextrose 10% Bolus 125 milliLiter(s) IV Bolus once  dextrose 5%. 1000 milliLiter(s) IV Continuous <Continuous>  dextrose 5%. 1000 milliLiter(s) IV Continuous <Continuous>  dextrose 5%. 1000 milliLiter(s) IV Continuous <Continuous>  magnesium oxide 400 milliGRAM(s) Oral daily  zinc sulfate 220 milliGRAM(s) Oral daily                ICU Vital Signs Last 24 Hrs  T(C): 37.3 (29 May 2024 05:34), Max: 37.3 (29 May 2024 05:34)  T(F): 99.2 (29 May 2024 05:34), Max: 99.2 (29 May 2024 05:34)  HR: 115 (29 May 2024 18:06) (81 - 115)  BP: 127/70 (29 May 2024 18:06) (103/66 - 148/79)  BP(mean): --  ABP: --  ABP(mean): --  RR: 19 (29 May 2024 05:34) (19 - 20)  SpO2: 98% (29 May 2024 17:15) (91% - 98%)    O2 Parameters below as of 29 May 2024 14:09  Patient On (Oxygen Delivery Method): BiPAP/CPAP          Vital Signs Last 24 Hrs  T(C): 37.3 (29 May 2024 05:34), Max: 37.3 (29 May 2024 05:34)  T(F): 99.2 (29 May 2024 05:34), Max: 99.2 (29 May 2024 05:34)  HR: 115 (29 May 2024 18:06) (81 - 115)  BP: 127/70 (29 May 2024 18:06) (103/66 - 148/79)  BP(mean): --  RR: 19 (29 May 2024 05:34) (19 - 20)  SpO2: 98% (29 May 2024 17:15) (91% - 98%)    Parameters below as of 29 May 2024 14:09  Patient On (Oxygen Delivery Method): BiPAP/CPAP        ABG - ( 29 May 2024 16:26 )  pH, Arterial: 7.37  pH, Blood: x     /  pCO2: 73    /  pO2: 81    / HCO3: 42    / Base Excess: 16.9  /  SaO2: 98.3                I&O's Detail    28 May 2024 07:01  -  29 May 2024 07:00  --------------------------------------------------------  IN:    Enteral Tube Flush: 360 mL    Free Water: 400 mL    Glucerna 1.5: 585 mL    Heparin Infusion: 60 mL    IV PiggyBack: 100 mL  Total IN: 1505 mL    OUT:  Total OUT: 0 mL    Total NET: 1505 mL            LABS:                        9.0    17.88 )-----------( 196      ( 29 May 2024 11:00 )             32.5     05-29    149<H>  |  110<H>  |  48<H>  ----------------------------<  356<H>  4.5   |  43<H>  |  1.70<H>    Ca    8.8      29 May 2024 11:00  Phos  2.8       Mg     2.7         TPro  5.9<L>  /  Alb  2.0<L>  /  TBili  0.4  /  DBili  x   /  AST  72<H>  /  ALT  90<H>  /  AlkPhos  167<H>            CAPILLARY BLOOD GLUCOSE      POCT Blood Glucose.: 342 mg/dL (29 May 2024 18:01)    PTT - ( 28 May 2024 14:10 )  PTT:43.8 sec  Urinalysis Basic - ( 29 May 2024 11:00 )    Color: x / Appearance: x / SG: x / pH: x  Gluc: 356 mg/dL / Ketone: x  / Bili: x / Urobili: x   Blood: x / Protein: x / Nitrite: x   Leuk Esterase: x / RBC: x / WBC x   Sq Epi: x / Non Sq Epi: x / Bacteria: x      CULTURES:  Culture Results:   No growth ( @ 17:49)  Culture Results:   No growth at 48 Hours ( @ 17:00)  Culture Results:   No growth at 48 Hours ( @ 16:45)      Physical Examination:    General: Somnolent, barely rousable. Chronically ill appearing.    HEENT: Pupils equal, reactive to light.  Symmetric.    PULM: B/l wheezing    CVS: Regular rate, irregular rhythm, S1, S2    ABD: Soft, nondistended    EXT: No edema, nontender    SKIN: Warm and well perfused    RADIOLOGY:   < from: CT Head No Cont (24 @ 12:34) >  IMPRESSION:  Degraded by motion.  Stable small to moderate subacute posterior left frontal infarct with   mild associated petechial hemorrhage.    --- End of Report ---    < end of copied text >  < from: MR Head No Cont (24 @ 12:06) >  IMPRESSION: New posterior left corona radiata/frontal lobe infarct.    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   Petechial hemorrhage in the left parietal lobe. Bilateral areas of recent   microhemorrhages.    --- End of Report ---    < end of copied text >  < from: CT Head No Cont (24 @ 12:18) >  Impression: Stable exam.    --- End of Report ---    < end of copied text >  < from: CT Abdomen and Pelvis No Cont (24 @ 18:37) >  IMPRESSION:  Layering secretions in the lower trachea and bilateral proximal mainstem   bronchi with scattered opacification of distal branching lobar and   segmental airways bilaterally and with peribronchial wall thickening in   lower lobes suggesting bronchitis. No lung consolidation.  No source of infection identified in the abdomen or pelvis.    --- End of Report ---    < end of copied text >  < from: CT Chest No Cont (24 @ 18:37) >  IMPRESSION:  Layering secretions in the lower trachea and bilateral proximal mainstem   bronchi with scattered opacification of distal branching lobar and   segmental airways bilaterally and with peribronchial wall thickening in   lower lobes suggesting bronchitis. No lung consolidation.  No source of infection identified in the abdomen or pelvis.    --- End of Report ---    < end of copied text >  < from: CT Head No Cont (24 @ 18:36) >  IMPRESSION:  1.  Gyriform hyperdensity in the region of a left frontal parietal   subacute infarct suggesting cortical laminar necrosis or hemorrhage.  2.  Small subacute high right frontal infarct. Subacute bilateral   cerebellar infarcts.    --- End of Report ---      < end of copied text >  < from: Xray Chest 1 View- PORTABLE-Urgent (Xray Chest 1 View- PORTABLE-Urgent .) (24 @ 16:39) >  IMPRESSION:  No acute parenchymal disease    Please refer to CT chest of the same day for additional information.    --- End of Report ---      < end of copied text >

## 2024-05-29 NOTE — PROGRESS NOTE PEDS - PROBLEM SELECTOR PLAN 8
- Resume tube feed  - Nutrition consult    #Advanced Care Planning  - Pt DNR/DNI at Rehab, MOLST filled with wife Saina at bedside, placed in chart

## 2024-05-29 NOTE — PROGRESS NOTE ADULT - SUBJECTIVE AND OBJECTIVE BOX
Patient is a 84y old  Male who presents with a chief complaint of AMS, CVA (28 May 2024 10:41)    Patient seen in follow up for hypernatremia, MERRILL.        PAST MEDICAL HISTORY:  Diabetes Mellitus, Type II    CAD (Coronary Artery Disease)    Dyslipidemia    Asymptomatic Peripheral Vascular Disease    Osteomyelitis    COPD (chronic obstructive pulmonary disease)    Diabetes mellitus    Hypertension    PVD (peripheral vascular disease)    History of PAT (paroxysmal atrial tachycardia)    Asthma with COPD    BPH (benign prostatic hyperplasia)    Acute osteomyelitis      MEDICATIONS  (STANDING):  acetaminophen     Tablet .. 650 milliGRAM(s) Oral every 6 hours  aMIOdarone    Tablet 200 milliGRAM(s) Oral daily  apixaban 2.5 milliGRAM(s) Oral every 12 hours  atorvastatin 80 milliGRAM(s) Oral at bedtime  budesonide 160 MICROgram(s)/formoterol 4.5 MICROgram(s) Inhaler 2 Puff(s) Inhalation two times a day  dextrose 10% Bolus 125 milliLiter(s) IV Bolus once  dextrose 5%. 1000 milliLiter(s) (75 mL/Hr) IV Continuous <Continuous>  dextrose 5%. 1000 milliLiter(s) (100 mL/Hr) IV Continuous <Continuous>  dextrose 5%. 1000 milliLiter(s) (50 mL/Hr) IV Continuous <Continuous>  dextrose 50% Injectable 12.5 Gram(s) IV Push once  dextrose 50% Injectable 25 Gram(s) IV Push once  glucagon  Injectable 1 milliGRAM(s) IntraMuscular once  insulin glargine Injectable (LANTUS) 36 Unit(s) SubCutaneous at bedtime  insulin lispro (ADMELOG) corrective regimen sliding scale   SubCutaneous <User Schedule>  magnesium oxide 400 milliGRAM(s) Oral daily  metolazone 2.5 milliGRAM(s) Oral daily  metoprolol tartrate 25 milliGRAM(s) Oral two times a day  montelukast 10 milliGRAM(s) Oral at bedtime  piperacillin/tazobactam IVPB.. 3.375 Gram(s) IV Intermittent every 8 hours  tiotropium 2.5 MICROgram(s) Inhaler 2 Puff(s) Inhalation daily  zinc sulfate 220 milliGRAM(s) Oral daily    MEDICATIONS  (PRN):  albuterol    0.083% 2.5 milliGRAM(s) Nebulizer every 2 hours PRN Shortness of Breath and/or Wheezing  dextrose Oral Gel 15 Gram(s) Oral once PRN Blood Glucose LESS THAN 70 milliGRAM(s)/deciliter    T(C): 37.3 (05-29-24 @ 05:34), Max: 37.8 (05-28-24 @ 18:15)  HR: 115 (05-29-24 @ 18:06) (80 - 115)  BP: 127/70 (05-29-24 @ 18:06) (96/62 - 148/79)  RR: 19 (05-29-24 @ 05:34)  SpO2: 98% (05-29-24 @ 17:15)  Wt(kg): --  I&O's Detail    28 May 2024 07:01  -  29 May 2024 07:00  --------------------------------------------------------  IN:    Enteral Tube Flush: 360 mL    Free Water: 400 mL    Glucerna 1.5: 585 mL    Heparin Infusion: 60 mL    IV PiggyBack: 100 mL  Total IN: 1505 mL    OUT:  Total OUT: 0 mL    Total NET: 1505 mL                PHYSICAL EXAM:  General: No distress  Respiratory: b/l air entry  Cardiovascular: S1 S2  Gastrointestinal: soft  Extremities:  + edema               LABORATORY:                        9.0    17.88 )-----------( 196      ( 29 May 2024 11:00 )             32.5     05-29    149<H>  |  110<H>  |  48<H>  ----------------------------<  356<H>  4.5   |  43<H>  |  1.70<H>    Ca    8.8      29 May 2024 11:00  Phos  2.8     05-29  Mg     2.7     05-29    TPro  5.9<L>  /  Alb  2.0<L>  /  TBili  0.4  /  DBili  x   /  AST  72<H>  /  ALT  90<H>  /  AlkPhos  167<H>  05-29    Sodium: 149 mmol/L (05-29 @ 11:00)  Sodium: 155 mmol/L (05-28 @ 06:16)    Potassium: 4.5 mmol/L (05-29 @ 11:00)  Potassium: 4.2 mmol/L (05-28 @ 06:16)    Hemoglobin: 9.0 g/dL (05-29 @ 11:00)  Hemoglobin: 9.7 g/dL (05-28 @ 06:16)  Hemoglobin: 10.8 g/dL (05-27 @ 07:27)  Hemoglobin: 11.2 g/dL (05-27 @ 03:37)    Creatinine, Serum 1.70 (05-29 @ 11:00)  Creatinine, Serum 1.80 (05-28 @ 06:16)  Creatinine, Serum 1.60 (05-27 @ 07:27)        LIVER FUNCTIONS - ( 29 May 2024 11:00 )  Alb: 2.0 g/dL / Pro: 5.9 g/dL / ALK PHOS: 167 U/L / ALT: 90 U/L / AST: 72 U/L / GGT: x           Urinalysis Basic - ( 29 May 2024 11:00 )    Color: x / Appearance: x / SG: x / pH: x  Gluc: 356 mg/dL / Ketone: x  / Bili: x / Urobili: x   Blood: x / Protein: x / Nitrite: x   Leuk Esterase: x / RBC: x / WBC x   Sq Epi: x / Non Sq Epi: x / Bacteria: x      ABG - ( 29 May 2024 16:26 )  pH, Arterial: 7.37  pH, Blood: x     /  pCO2: 73    /  pO2: 81    / HCO3: 42    / Base Excess: 16.9  /  SaO2: 98.3

## 2024-05-29 NOTE — CONSULT NOTE ADULT - NS PANP COMMENT GEN_ALL_CORE FT
83 yo man with Hx Afib on eliquis, CAD, severe asthma, recent admission for L MCA infarct, now admitted 5/26 with hyperglycemia and lethargy, found to have new posterior L corona radiate/frontal infarct.  Small amount of petechial hemorrhage seen on MRI yesterday, stable on CTH today.  At approx same time he developed progressive hypercapnic respiratory failure and obtundation.  Started on BiPAP, now with improvement in hypercapnia.  Mental status seems starting to improve.  Good respiratory mechanics on BiPAP with diminished BS b/l but good air movement.  Would continue BiPAP for this evening and overnight and try to wean in am.  Not an ICU candidate at this time, call if condition changes.    DNR/DNI  Discussed with primary team

## 2024-05-29 NOTE — CONSULT NOTE ADULT - SUBJECTIVE AND OBJECTIVE BOX
Patient is a 84y old  Male who presents with a chief complaint of AMS, CVA (29 May 2024 18:08)      BRIEF HOSPITAL COURSE:  83yo M PMHx AF (on Eliquis), CAD (s/p multiple stents), HTN, DM, HLD, COPD, OM (L Femur, on Cipro), recently admitted to [facility] for RVR, hospital course complicated by new L M3 CVA, PEG tube placed 5/15, subsequently discharged to [facility]. Now presenting from rehab and admitted for new AMS, new B/L ischemic strokes.    ICU consulted today for acute on chronic hypercapnic respiratory failure      PAST MEDICAL & SURGICAL HISTORY:  Diabetes Mellitus, Type II      CAD (Coronary Artery Disease)  s/p 3v CABG ; stents placed [place]       Dyslipidemia      Osteomyelitis      COPD (chronic obstructive pulmonary disease)  on 2L at home and BiPAP at night; intubated       Hypertension      PVD (peripheral vascular disease)      History of PAT (paroxysmal atrial tachycardia)      Asthma with COPD      BPH (benign prostatic hyperplasia)      Acute osteomyelitis      CABG (Coronary Artery Bypass Graft)        Compound fracture  left leg      S/P primary angioplasty with coronary stent      H/O drainage of abscess  Left femur 2021        Allergies    No Known Allergies    Intolerances      FAMILY HISTORY:  Family history of diabetes mellitus (Sibling)    Family hx of lung cancer  brother,  age 82, used to smoke with pt        Review of Systems:  Unable to obtain due to altered mental status     Medications:  piperacillin/tazobactam IVPB.. 3.375 Gram(s) IV Intermittent every 8 hours    aMIOdarone    Tablet 200 milliGRAM(s) Oral daily  metolazone 2.5 milliGRAM(s) Oral daily  metoprolol tartrate 25 milliGRAM(s) Oral two times a day    albuterol    0.083% 2.5 milliGRAM(s) Nebulizer every 2 hours PRN  budesonide 160 MICROgram(s)/formoterol 4.5 MICROgram(s) Inhaler 2 Puff(s) Inhalation two times a day  montelukast 10 milliGRAM(s) Oral at bedtime  tiotropium 2.5 MICROgram(s) Inhaler 2 Puff(s) Inhalation daily    acetaminophen     Tablet .. 650 milliGRAM(s) Oral every 6 hours      apixaban 2.5 milliGRAM(s) Oral every 12 hours        atorvastatin 80 milliGRAM(s) Oral at bedtime  dextrose 50% Injectable 12.5 Gram(s) IV Push once  dextrose 50% Injectable 25 Gram(s) IV Push once  dextrose Oral Gel 15 Gram(s) Oral once PRN  glucagon  Injectable 1 milliGRAM(s) IntraMuscular once  insulin glargine Injectable (LANTUS) 36 Unit(s) SubCutaneous at bedtime  insulin lispro (ADMELOG) corrective regimen sliding scale   SubCutaneous <User Schedule>    dextrose 10% Bolus 125 milliLiter(s) IV Bolus once  dextrose 5%. 1000 milliLiter(s) IV Continuous <Continuous>  dextrose 5%. 1000 milliLiter(s) IV Continuous <Continuous>  dextrose 5%. 1000 milliLiter(s) IV Continuous <Continuous>  magnesium oxide 400 milliGRAM(s) Oral daily  zinc sulfate 220 milliGRAM(s) Oral daily                ICU Vital Signs Last 24 Hrs  T(C): 37.3 (29 May 2024 05:34), Max: 37.3 (29 May 2024 05:34)  T(F): 99.2 (29 May 2024 05:34), Max: 99.2 (29 May 2024 05:34)  HR: 115 (29 May 2024 18:06) (81 - 115)  BP: 127/70 (29 May 2024 18:06) (103/66 - 148/79)  BP(mean): --  ABP: --  ABP(mean): --  RR: 19 (29 May 2024 05:34) (19 - 20)  SpO2: 98% (29 May 2024 17:15) (91% - 98%)    O2 Parameters below as of 29 May 2024 14:09  Patient On (Oxygen Delivery Method): BiPAP/CPAP          Vital Signs Last 24 Hrs  T(C): 37.3 (29 May 2024 05:34), Max: 37.3 (29 May 2024 05:34)  T(F): 99.2 (29 May 2024 05:34), Max: 99.2 (29 May 2024 05:34)  HR: 115 (29 May 2024 18:06) (81 - 115)  BP: 127/70 (29 May 2024 18:06) (103/66 - 148/79)  BP(mean): --  RR: 19 (29 May 2024 05:34) (19 - 20)  SpO2: 98% (29 May 2024 17:15) (91% - 98%)    Parameters below as of 29 May 2024 14:09  Patient On (Oxygen Delivery Method): BiPAP/CPAP        ABG - ( 29 May 2024 16:26 )  pH, Arterial: 7.37  pH, Blood: x     /  pCO2: 73    /  pO2: 81    / HCO3: 42    / Base Excess: 16.9  /  SaO2: 98.3                I&O's Detail    28 May 2024 07:01  -  29 May 2024 07:00  --------------------------------------------------------  IN:    Enteral Tube Flush: 360 mL    Free Water: 400 mL    Glucerna 1.5: 585 mL    Heparin Infusion: 60 mL    IV PiggyBack: 100 mL  Total IN: 1505 mL    OUT:  Total OUT: 0 mL    Total NET: 1505 mL            LABS:                        9.0    17.88 )-----------( 196      ( 29 May 2024 11:00 )             32.5     05-29    149<H>  |  110<H>  |  48<H>  ----------------------------<  356<H>  4.5   |  43<H>  |  1.70<H>    Ca    8.8      29 May 2024 11:00  Phos  2.8       Mg     2.7         TPro  5.9<L>  /  Alb  2.0<L>  /  TBili  0.4  /  DBili  x   /  AST  72<H>  /  ALT  90<H>  /  AlkPhos  167<H>            CAPILLARY BLOOD GLUCOSE      POCT Blood Glucose.: 342 mg/dL (29 May 2024 18:01)    PTT - ( 28 May 2024 14:10 )  PTT:43.8 sec  Urinalysis Basic - ( 29 May 2024 11:00 )    Color: x / Appearance: x / SG: x / pH: x  Gluc: 356 mg/dL / Ketone: x  / Bili: x / Urobili: x   Blood: x / Protein: x / Nitrite: x   Leuk Esterase: x / RBC: x / WBC x   Sq Epi: x / Non Sq Epi: x / Bacteria: x      CULTURES:  Culture Results:   No growth ( @ 17:49)  Culture Results:   No growth at 48 Hours ( @ 17:00)  Culture Results:   No growth at 48 Hours ( @ 16:45)      Physical Examination:    General: Somnolent, barely rousable. Chronically ill appearing.    HEENT: Pupils equal, reactive to light.  Symmetric.    PULM: B/l wheezing    CVS: Regular rate, irregular rhythm, S1, S2    ABD: Soft, nondistended    EXT: No edema, nontender    SKIN: Warm and well perfused    RADIOLOGY:   < from: CT Head No Cont (24 @ 12:34) >  IMPRESSION:  Degraded by motion.  Stable small to moderate subacute posterior left frontal infarct with   mild associated petechial hemorrhage.    --- End of Report ---    < end of copied text >  < from: MR Head No Cont (24 @ 12:06) >  IMPRESSION: New posterior left corona radiata/frontal lobe infarct.    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   Petechial hemorrhage in the left parietal lobe. Bilateral areas of recent   microhemorrhages.    --- End of Report ---    < end of copied text >  < from: CT Head No Cont (24 @ 12:18) >  Impression: Stable exam.    --- End of Report ---    < end of copied text >  < from: CT Abdomen and Pelvis No Cont (24 @ 18:37) >  IMPRESSION:  Layering secretions in the lower trachea and bilateral proximal mainstem   bronchi with scattered opacification of distal branching lobar and   segmental airways bilaterally and with peribronchial wall thickening in   lower lobes suggesting bronchitis. No lung consolidation.  No source of infection identified in the abdomen or pelvis.    --- End of Report ---    < end of copied text >  < from: CT Chest No Cont (24 @ 18:37) >  IMPRESSION:  Layering secretions in the lower trachea and bilateral proximal mainstem   bronchi with scattered opacification of distal branching lobar and   segmental airways bilaterally and with peribronchial wall thickening in   lower lobes suggesting bronchitis. No lung consolidation.  No source of infection identified in the abdomen or pelvis.    --- End of Report ---    < end of copied text >  < from: CT Head No Cont (24 @ 18:36) >  IMPRESSION:  1.  Gyriform hyperdensity in the region of a left frontal parietal   subacute infarct suggesting cortical laminar necrosis or hemorrhage.  2.  Small subacute high right frontal infarct. Subacute bilateral   cerebellar infarcts.    --- End of Report ---      < end of copied text >  < from: Xray Chest 1 View- PORTABLE-Urgent (Xray Chest 1 View- PORTABLE-Urgent .) (24 @ 16:39) >  IMPRESSION:  No acute parenchymal disease    Please refer to CT chest of the same day for additional information.    --- End of Report ---      < end of copied text >

## 2024-05-29 NOTE — PROGRESS NOTE ADULT - SUBJECTIVE AND OBJECTIVE BOX
CAPILLARY BLOOD GLUCOSE      POCT Blood Glucose.: 280 mg/dL (29 May 2024 04:35)  POCT Blood Glucose.: 309 mg/dL (28 May 2024 21:48)  POCT Blood Glucose.: 292 mg/dL (28 May 2024 18:12)  POCT Blood Glucose.: 290 mg/dL (28 May 2024 17:03)  POCT Blood Glucose.: 175 mg/dL (28 May 2024 12:55)      Vital Signs Last 24 Hrs  T(C): 37.3 (29 May 2024 05:34), Max: 37.8 (28 May 2024 18:15)  T(F): 99.2 (29 May 2024 05:34), Max: 100 (28 May 2024 18:15)  HR: 89 (29 May 2024 05:34) (80 - 100)  BP: 148/79 (29 May 2024 05:34) (103/66 - 148/79)  BP(mean): --  RR: 19 (29 May 2024 05:34) (19 - 20)  SpO2: 91% (29 May 2024 05:34) (91% - 99%)    Parameters below as of 29 May 2024 05:34  Patient On (Oxygen Delivery Method): nasal cannula  O2 Flow (L/min): 4      General: WN/WD NAD  Respiratory: CTA B/L  CV: RRR, S1S2, no murmurs, rubs or gallops  Abdominal: Soft, NT, ND +BS, Last BM  Extremities: No edema, + peripheral pulses     05-28    155<H>  |  116<H>  |  55<H>  ----------------------------<  282<H>  4.2   |  41<H>  |  1.80<H>    Ca    9.3      28 May 2024 06:16    TPro  5.8<L>  /  Alb  2.1<L>  /  TBili  0.4  /  DBili  x   /  AST  35  /  ALT  45  /  AlkPhos  130<H>  05-28      atorvastatin 80 milliGRAM(s) Oral at bedtime  dextrose 50% Injectable 12.5 Gram(s) IV Push once  dextrose 50% Injectable 25 Gram(s) IV Push once  dextrose Oral Gel 15 Gram(s) Oral once PRN  glucagon  Injectable 1 milliGRAM(s) IntraMuscular once  insulin glargine Injectable (LANTUS) 30 Unit(s) SubCutaneous at bedtime  insulin lispro (ADMELOG) corrective regimen sliding scale   SubCutaneous <User Schedule>

## 2024-05-29 NOTE — PROGRESS NOTE PEDS - ASSESSMENT
83yo M PMHx AF (on Eliquis), CAD (s/p multiple stents), HTN, DM, HLD, COPD, OM (L Femur, on Cipro), recently admitted to [facility] for RVR, hospital course complicated by new L M3 CVA, PEG tube placed 5/15, subsequently discharged to [facility]. Now presenting from rehab and admitted for new AMS, new B/L ischemic strokes.

## 2024-05-29 NOTE — PROGRESS NOTE PEDS - PROBLEM SELECTOR PLAN 4
- Hx T2DM with hyperglycemia > 400   -Lantus 30 u subq at bedtime standing  -Sliding scale to reflect times feeds  - Hypoglycemia protocol  - On PEG Tube feeding

## 2024-05-29 NOTE — PROGRESS NOTE ADULT - PROBLEM SELECTOR PLAN 3
DNR/DNI in place  worsening mental status with respiratory status today  [facility] narrative above  family contemplative but for now ongoing medical management  option for comfort directed approach explained  multiple bed side visits today to see and assess patient and discuss with family    In the event family considers/decides on a comfort based approach, would recommend the following:  Given organ dysfunction (renal +/- hepatic), would avoid morphine.  IV dilaudid 0.2mg q2h prn for moderate pain  IV dilaudid 0.5mg q2h prn for severe pain  IV dilaudid 0.5mg q2h prn for dyspnea- goal RR <24  IV ativan 0.5mg q2h prn for anxiety, agitation, refractory dyspnea  IV glycopyrrolate 0.2mg q6h prn for secretions  dulcolax [place] PRN constipation, daily

## 2024-05-29 NOTE — PROGRESS NOTE ADULT - PROBLEM SELECTOR PLAN 1
increase lantus 36 units qhs  cont mod dose admelog corrective scale coverage q6hrs  goal bg less than 180mg/dl

## 2024-05-29 NOTE — PROGRESS NOTE ADULT - SUBJECTIVE AND OBJECTIVE BOX
Date/Time Patient Seen:  		  Referring MD:   Data Reviewed	       Patient is a 84y old  Male who presents with a chief complaint of AMS, CVA (28 May 2024 19:17)      Subjective/HPI     PAST MEDICAL & SURGICAL HISTORY:  Diabetes Mellitus, Type II    CAD (Coronary Artery Disease)  s/p 3v CABG 2004; stents placed in [place] in 2019    Dyslipidemia    Asymptomatic Peripheral Vascular Disease    Osteomyelitis    COPD (chronic obstructive pulmonary disease)  on 2L at home and BiPAP at night; intubated 6/18    Diabetes mellitus    Hypertension    PVD (peripheral vascular disease)    History of PAT (paroxysmal atrial tachycardia)    Asthma with COPD    BPH (benign prostatic hyperplasia)    Acute osteomyelitis    CABG (Coronary Artery Bypass Graft)  2004    Compound fracture  left leg    S/P primary angioplasty with coronary stent    H/O drainage of abscess  Left femur 12/2021          Medication list         MEDICATIONS  (STANDING):  acetaminophen     Tablet .. 650 milliGRAM(s) Oral every 6 hours  albuterol    90 MICROgram(s) HFA Inhaler 2 Puff(s) Inhalation every 6 hours  aMIOdarone    Tablet 200 milliGRAM(s) Oral daily  atorvastatin 80 milliGRAM(s) Oral at bedtime  budesonide 160 MICROgram(s)/formoterol 4.5 MICROgram(s) Inhaler 2 Puff(s) Inhalation two times a day  dextrose 10% Bolus 125 milliLiter(s) IV Bolus once  dextrose 5%. 1000 milliLiter(s) (100 mL/Hr) IV Continuous <Continuous>  dextrose 5%. 1000 milliLiter(s) (75 mL/Hr) IV Continuous <Continuous>  dextrose 5%. 1000 milliLiter(s) (50 mL/Hr) IV Continuous <Continuous>  dextrose 50% Injectable 12.5 Gram(s) IV Push once  dextrose 50% Injectable 25 Gram(s) IV Push once  glucagon  Injectable 1 milliGRAM(s) IntraMuscular once  insulin glargine Injectable (LANTUS) 30 Unit(s) SubCutaneous at bedtime  insulin lispro (ADMELOG) corrective regimen sliding scale   SubCutaneous <User Schedule>  magnesium oxide 400 milliGRAM(s) Oral daily  metoprolol tartrate 25 milliGRAM(s) Oral two times a day  montelukast 10 milliGRAM(s) Oral at bedtime  piperacillin/tazobactam IVPB.. 3.375 Gram(s) IV Intermittent every 8 hours  tiotropium 2.5 MICROgram(s) Inhaler 2 Puff(s) Inhalation daily  zinc sulfate 220 milliGRAM(s) Oral daily    MEDICATIONS  (PRN):  dextrose Oral Gel 15 Gram(s) Oral once PRN Blood Glucose LESS THAN 70 milliGRAM(s)/deciliter         Vitals log        ICU Vital Signs Last 24 Hrs  T(C): 36.5 (28 May 2024 21:34), Max: 37.8 (28 May 2024 18:15)  T(F): 97.7 (28 May 2024 21:34), Max: 100 (28 May 2024 18:15)  HR: 92 (28 May 2024 21:34) (80 - 100)  BP: 103/66 (28 May 2024 21:34) (103/66 - 129/68)  BP(mean): --  ABP: --  ABP(mean): --  RR: 20 (28 May 2024 21:34) (20 - 20)  SpO2: 96% (28 May 2024 21:34) (96% - 99%)    O2 Parameters below as of 28 May 2024 21:34  Patient On (Oxygen Delivery Method): nasal cannula  O2 Flow (L/min): 4               Input and Output:  I&O's Detail    27 May 2024 07:01  -  28 May 2024 07:00  --------------------------------------------------------  IN:    Heparin Infusion: 10 mL  Total IN: 10 mL    OUT:    Voided (mL): 1700 mL  Total OUT: 1700 mL    Total NET: -1690 mL      28 May 2024 07:01  -  29 May 2024 05:21  --------------------------------------------------------  IN:    Enteral Tube Flush: 360 mL    Free Water: 400 mL    Glucerna 1.5: 585 mL    Heparin Infusion: 60 mL    IV PiggyBack: 100 mL  Total IN: 1505 mL    OUT:  Total OUT: 0 mL    Total NET: 1505 mL          Lab Data                        9.7    15.71 )-----------( 195      ( 28 May 2024 06:16 )             34.6     05-28    155<H>  |  116<H>  |  55<H>  ----------------------------<  282<H>  4.2   |  41<H>  |  1.80<H>    Ca    9.3      28 May 2024 06:16    TPro  5.8<L>  /  Alb  2.1<L>  /  TBili  0.4  /  DBili  x   /  AST  35  /  ALT  45  /  AlkPhos  130<H>  05-28            Review of Systems	      Objective     Physical Examination    heart s1s2  lung dc BS  head nc      Pertinent Lab findings & Imaging      Eliecer:  NO   Adequate UO     I&O's Detail    27 May 2024 07:01  -  28 May 2024 07:00  --------------------------------------------------------  IN:    Heparin Infusion: 10 mL  Total IN: 10 mL    OUT:    Voided (mL): 1700 mL  Total OUT: 1700 mL    Total NET: -1690 mL      28 May 2024 07:01  -  29 May 2024 05:21  --------------------------------------------------------  IN:    Enteral Tube Flush: 360 mL    Free Water: 400 mL    Glucerna 1.5: 585 mL    Heparin Infusion: 60 mL    IV PiggyBack: 100 mL  Total IN: 1505 mL    OUT:  Total OUT: 0 mL    Total NET: 1505 mL               Discussed with:     Cultures:	        Radiology

## 2024-05-29 NOTE — PROGRESS NOTE ADULT - SUBJECTIVE AND OBJECTIVE BOX
Neurology Follow up note    YUE COTTO84yMale    HPI:  83yo M PMHx AF (on Eliquis), CAD (s/p multiple stents), HTN, DM, HLD, COPD, OM (L Femur, on Cipro), recently admitted to [facility] for RVR, hospital course complicated by new L M3 CVA, PEG tube placed 5/15, subsequently discharged to [facility]. Pt now presenting from Rehab with decline in mental status per wife Sania, at bedside. History obtained entirely from wife, as pt unable to speak presently. States she initially noticed him being "nonresponsive," noncommunicative on yesterday in the rehab, with no change into today. States that before this he did have slurred speech with waxing and waning dysarthria, however was baseline alert and oriented x3. Was also baseline with minimal to no motor function of RLE, RUE, with diminished motor function of LUE, LLE, was requiring assistance in all activities. Of note, wife states she noticed patient was not on Ciprofloxacin (chronic med for OM) while in rehab, and inquired as to why. No answer as to why, however patient was restarted on Cipro in Rehab.    ED Course:  Vitals: T 100.8 F, , BP 95/59, RR 26, SpO2 93% on 2L  Given: Ofirmev x1, Rocephin x1, 1L NS Bolus x2  Pertinent Labs: WBC 10.5, Na 150, HCO3 42, BUN/Cr 50/1.8, Glu 414. Mg 2.8. Lactate 2.2 --> 1.5. HsTi 185.6. Pro-.  AB.44 / 65 / 89 / 44    Imaging:  CT C/A/P No con: Layering secretions in the lower trachea and bilateral proximal mainstem  bronchi with scattered opacification of distal branching lobar and segmental airways bilaterally and with peribronchial wall thickening in lower lobes suggesting bronchitis. No lung consolidation. No source of infection identified in the abdomen or pelvis. PEG tube in place. Scattered colonic diverticula.  CT Head No Con: 1.  Gyriform hyperdensity in the region of a left frontal parietal subacute infarct suggesting cortical laminar necrosis or hemorrhage. 2.  Small subacute high right frontal infarct. Subacute bilateral cerebellar infarcts.    EKG: Sinus Tachycardia w/PVC on personal read; Rate 105, QTc 452 (26 May 2024 21:02)      Interval History vert sleepy    Patient is seen, chart was reviewed and case was discussed with the treatment team.  Pt is not in any distress.   Lying on bed comfortably.       Vital Signs Last 24 Hrs  T(C): 37.3 (29 May 2024 05:34), Max: 37.8 (28 May 2024 18:15)  T(F): 99.2 (29 May 2024 05:34), Max: 100 (28 May 2024 18:15)  HR: 115 (29 May 2024 18:06) (81 - 115)  BP: 127/70 (29 May 2024 18:06) (103/66 - 148/79)  BP(mean): --  RR: 19 (29 May 2024 05:34) (19 - 20)  SpO2: 98% (29 May 2024 17:15) (91% - 98%)    Parameters below as of 29 May 2024 14:09  Patient On (Oxygen Delivery Method): BiPAP/CPAP              REVIEW OF SYSTEMS:  limited due to aphasia  not in any distress      Mental Status - Pt is unresponsive to verbal command  not following any commands     Speech - Pt is non verbal    Cranial Nerves - Pupils 3 mm equal and reactive to light, extraocular eye movements intact. Pt has no visual field deficit.  Pt has  right  facial asymmetry. Facial sensation is intact.Tongue - is in midline.    Muscle tone - is reduced on right  .      Motor Exam - RUE 0/5; RLE 1/5  Left side 2-3/5    Sensory Exam -. Pt withdraws all extremities equally on stimulation        coordination:    Finger to nose: normal    Heel to shin: normal    Deep tendon Reflexes - 2 plus all over          Neck Supple -  Yes.     MEDICATIONS  (STANDING):  acetaminophen     Tablet .. 650 milliGRAM(s) Oral every 6 hours  aMIOdarone    Tablet 200 milliGRAM(s) Oral daily  apixaban 2.5 milliGRAM(s) Oral every 12 hours  atorvastatin 80 milliGRAM(s) Oral at bedtime  budesonide 160 MICROgram(s)/formoterol 4.5 MICROgram(s) Inhaler 2 Puff(s) Inhalation two times a day  dextrose 10% Bolus 125 milliLiter(s) IV Bolus once  dextrose 5%. 1000 milliLiter(s) (100 mL/Hr) IV Continuous <Continuous>  dextrose 5%. 1000 milliLiter(s) (50 mL/Hr) IV Continuous <Continuous>  dextrose 5%. 1000 milliLiter(s) (75 mL/Hr) IV Continuous <Continuous>  dextrose 50% Injectable 25 Gram(s) IV Push once  dextrose 50% Injectable 12.5 Gram(s) IV Push once  glucagon  Injectable 1 milliGRAM(s) IntraMuscular once  insulin glargine Injectable (LANTUS) 36 Unit(s) SubCutaneous at bedtime  insulin lispro (ADMELOG) corrective regimen sliding scale   SubCutaneous <User Schedule>  magnesium oxide 400 milliGRAM(s) Oral daily  metolazone 2.5 milliGRAM(s) Oral daily  metoprolol tartrate 25 milliGRAM(s) Oral two times a day  montelukast 10 milliGRAM(s) Oral at bedtime  piperacillin/tazobactam IVPB.. 3.375 Gram(s) IV Intermittent every 8 hours  tiotropium 2.5 MICROgram(s) Inhaler 2 Puff(s) Inhalation daily  zinc sulfate 220 milliGRAM(s) Oral daily    MEDICATIONS  (PRN):  albuterol    0.083% 2.5 milliGRAM(s) Nebulizer every 2 hours PRN Shortness of Breath and/or Wheezing  dextrose Oral Gel 15 Gram(s) Oral once PRN Blood Glucose LESS THAN 70 milliGRAM(s)/deciliter      Allergies    No Known Allergies    Intolerances                          9.0    17.88 )-----------( 196      ( 29 May 2024 11:00 )             32.5     Urinalysis Basic - ( 28 May 2024 06:16 )    Color: x / Appearance: x / SG: x / pH: x  Gluc: 282 mg/dL / Ketone: x  / Bili: x / Urobili: x   Blood: x / Protein: x / Nitrite: x   Leuk Esterase: x / RBC: x / WBC x   Sq Epi: x / Non Sq Epi: x / Bacteria: x          155<H>  |  116<H>  |  55<H>  ----------------------------<  282<H>  4.2   |  41<H>  |  1.80<H>    Ca    9.3      28 May 2024 06:16    TPro  5.8<L>  /  Alb  2.1<L>  /  TBili  0.4  /  DBili  x   /  AST  35  /  ALT  45  /  AlkPhos  130<H>      Hemoglobin A1C:     Vitamin B12     RADIOLOGY    ASSESSMENT AND PLAN:      seen for ams  related to-  hypercarbic resp failure  new cva      ON BIPAP  Resume apixaban.  Pain is accessed and addressed.  Plan of care was discussed with family. Questions answered.  Would continue to follow.

## 2024-05-29 NOTE — PROGRESS NOTE ADULT - SUBJECTIVE AND OBJECTIVE BOX
[facility]  INFECTIOUS DISEASES   [address] [place] [address]  Tel: [phone]     Fax: [phone]  ========================================================  MD Noman Perry Kaushal, MD Cho, Michelle, MD Sunjit, Jaspal, MD  ========================================================    [facility]-[id number]  YUE Collbran     Follow up: Stroke and fever    Patient is stuporous today, not responding, his breathing is shallow family at the bedside.  They don't want to withdraw any care at this time. No fever.    PAST MEDICAL & SURGICAL HISTORY:  Diabetes Mellitus, Type II  CAD (Coronary Artery Disease)  s/p 3v CABG ; stents placed in [place] in   Dyslipidemia  Osteomyelitis  COPD (chronic obstructive pulmonary disease)  on 2L at home and BiPAP at night; intubated   Hypertension  PVD (peripheral vascular disease)  History of PAT (paroxysmal atrial tachycardia)  Asthma with COPD  BPH (benign prostatic hyperplasia)  Acute osteomyelitis  CABG (Coronary Artery Bypass Graft)    Compound fracture  left leg  S/P primary angioplasty with coronary stent  H/O drainage of abscess  Left femur 2021    Social Hx: No current smoking, EtOH or drugs     FAMILY HISTORY:  Family history of diabetes mellitus (Sibling)    Family hx of lung cancer  brother,  age 82, used to smoke with pt    Allergies  No Known Allergies    MEDICATIONS  (STANDING):  acetaminophen     Tablet .. 650 milliGRAM(s) Oral every 6 hours  albuterol    90 MICROgram(s) HFA Inhaler 2 Puff(s) Inhalation every 6 hours  aMIOdarone    Tablet 200 milliGRAM(s) Oral daily  atorvastatin 80 milliGRAM(s) Oral at bedtime  budesonide 160 MICROgram(s)/formoterol 4.5 MICROgram(s) Inhaler 2 Puff(s) Inhalation two times a day  dextrose 10% Bolus 125 milliLiter(s) IV Bolus once  dextrose 5%. 1000 milliLiter(s) (100 mL/Hr) IV Continuous <Continuous>  dextrose 5%. 1000 milliLiter(s) (50 mL/Hr) IV Continuous <Continuous>  dextrose 50% Injectable 12.5 Gram(s) IV Push once  dextrose 50% Injectable 25 Gram(s) IV Push once  glucagon  Injectable 1 milliGRAM(s) IntraMuscular once  heparin   Injectable 8000 Unit(s) IV Push once  heparin  Infusion.  Unit(s)/Hr (18 mL/Hr) IV Continuous <Continuous>  insulin glargine Injectable (LANTUS) 30 Unit(s) SubCutaneous at bedtime  insulin lispro (ADMELOG) corrective regimen sliding scale   SubCutaneous every 6 hours  insulin lispro Injectable (ADMELOG) 3 Unit(s) SubCutaneous every 6 hours  magnesium oxide 400 milliGRAM(s) Oral daily  metoprolol tartrate 25 milliGRAM(s) Oral two times a day  montelukast 10 milliGRAM(s) Oral at bedtime  piperacillin/tazobactam IVPB. 3.375 Gram(s) IV Intermittent once  piperacillin/tazobactam IVPB.- 3.375 Gram(s) IV Intermittent once  piperacillin/tazobactam IVPB.. 3.375 Gram(s) IV Intermittent every 8 hours  tiotropium 2.5 MICROgram(s) Inhaler 2 Puff(s) Inhalation daily  zinc sulfate 220 milliGRAM(s) Oral daily    MEDICATIONS  (PRN):  dextrose Oral Gel 15 Gram(s) Oral once PRN Blood Glucose LESS THAN 70 milliGRAM(s)/deciliter  heparin   Injectable 4000 Unit(s) IV Push every 6 hours PRN For aPTT between 40 - 57  heparin   Injectable 8000 Unit(s) IV Push every 6 hours PRN For aPTT less than 40     REVIEW OF SYSTEMS:  Unable     Physical Exam:  Vital Signs Last 24 Hrs  T(C): 37.3 (29 May 2024 05:34), Max: 37.8 (28 May 2024 18:15)  T(F): 99.2 (29 May 2024 05:34), Max: 100 (28 May 2024 18:15)  HR: 89 (29 May 2024 05:34) (80 - 100)  BP: 148/79 (29 May 2024 05:34) (103/66 - 148/79)  BP(mean): --  RR: 19 (29 May 2024 05:34) (19 - 20)  SpO2: 91% (29 May 2024 05:34) (91% - 99%)  Parameters below as of 29 May 2024 05:34  Patient On (Oxygen Delivery Method): nasal cannula  O2 Flow (L/min): 4  GEN: NAD lying bed on O2 with NC   HEENT: normocephalic and atraumatic.   NECK: Supple.  No lymphadenopathy   LUNGS: scattered rhonchi bilaterally   HEART: Irregular rate and rhythm  ABDOMEN: Soft, and nondistended. PEG in place looks fine  EXTREMITIES: Without edema. Left femur with scar and   fluctuation, no discharge from area   NEUROLOGIC: unable   PSYCHIATRIC: more awake and trying to communicate   SKIN: No rash     Labs:                        9.0    17.88 )-----------( 196      ( 29 May 2024 11:00 )             32.5         149<H>  |  110<H>  |  48<H>  ----------------------------<  356<H>  4.5   |  43<H>  |  1.70<H>    Ca    8.8      29 May 2024 11:00  Phos  2.8       Mg     2.7         TPro  5.9<L>  /  Alb  2.0<L>  /  TBili  0.4  /  DBili  x   /  AST  72<H>  /  ALT  90<H>  /  AlkPhos  167<H>      Culture - Urine (collected 24 @ 17:49)  Source: Catheterized Catheterized  Final Report (24 @ 16:16):    No growth    Culture - Blood (collected 24 @ 17:00)  Source: .Blood Blood-Peripheral  Preliminary Report (24 @ 22:02):    No growth at 48 Hours    Culture - Blood (collected 24 @ 16:45)  Source: .Blood Blood-Peripheral  Preliminary Report (24 @ 22:02):    No growth at 48 Hours    Culture - Urine (collected 24 @ 01:15)  Source: Clean Catch Clean Catch (Midstream)  Final Report (24 @ 12:08):    No growth    Culture - Blood (collected 24 @ 23:50)  Source: .Blood Blood-Peripheral  Final Report (24 @ 05:01):    No growth at 5 days    Culture - Blood (collected 24 @ 23:45)  Source: .Blood Blood-Peripheral  Final Report (24 @ 05:01):    No growth at 5 days    Culture - Other (collected 24 @ 11:19)  Source: Wound Wound  Final Report (03-15-24 @ 14:55):    Moderate Staphylococcus pettenkoferi "Susceptibilities not performed"    Culture - Abscess with Gram Stain (collected 03-10-23 @ 11:50)  Source: .Abscess left leg  Final Report (03-15-23 @ 19:04):    Few Corynebacterium jeikeium    "Susceptibilities not performed"    Culture - Abscess with Gram Stain (collected 23 @ 13:50)  Source: .Abscess left thigh  Final Report (23 @ 14:49):    Moderate Coag Negative Staphylococcus "Susceptibilities not performed"    Multiple Morphological Strains    Culture - Urine (collected 23 @ 20:15)  Source: Clean Catch Clean Catch (Midstream)  Final Report (23 @ 23:02):    <10,000 CFU/mL Normal Urogenital Maria Esther    Culture - Blood (collected 23 @ 15:53)  Source: .Blood Blood-Peripheral  Final Report (23 @ 23:01):    No Growth Final    Culture - Blood (collected 23 @ 15:43)  Source: .Blood Blood-Peripheral  Final Report (23 @ 23:01):    No Growth Final    Culture - Blood (collected 22 @ 08:40)  Source: .Blood Blood-Peripheral  Final Report (22 @ 12:00):    No Growth Final    Culture - Blood (collected 22 @ 08:35)  Source: .Blood Blood-Peripheral  Final Report (22 @ 12:00):    No Growth Final    Culture - Blood (collected 22 @ 15:23)  Source: .Blood Blood-Peripheral  Final Report (08-10-22 @ 01:01):    No Growth Final    Culture - Blood (collected 22 @ 14:30)  Source: .Blood Blood-Peripheral  Final Report (08-10-22 @ 01:01):    No Growth Final    WBC Count: 17.88 K/uL (24 @ 11:00)  WBC Count: 15.71 K/uL (24 @ 06:16)  WBC Count: 15.99 K/uL (24 @ 07:27)  WBC Count: 16.67 K/uL (24 @ 03:37)  WBC Count: 10.53 K/uL (24 @ 17:10)    Creatinine: 1.70 mg/dL (24 @ 11:00)  Creatinine: 1.80 mg/dL (24 @ 06:16)  Creatinine: 1.60 mg/dL (24 @ 07:27)  Creatinine: 1.80 mg/dL (24 @ 17:10)     SARS-CoV-2 Result: NotDetec (24 @ 17:10)    All imaging and other data have been reviewed.  < from: CT Abdomen and Pelvis No Cont (24 @ 18:37) >  IMPRESSION:  Layering secretions in the lower trachea and bilateral proximal mainstem   bronchi with scattered opacification of distal branching lobar and   segmental airways bilaterally and with peribronchial wall thickening in   lower lobes suggesting bronchitis. No lung consolidation.  No source of infection identified in the abdomen or pelvis.    CT head :  IMPRESSION:  1.  Gyriform hyperdensity in the region of a left frontal parietal   subacute infarct suggesting cortical laminar necrosis or hemorrhage.  2.  Small subacute high right frontal infarct. Subacute bilateral   cerebellar infarcts.    Assessment and Plan:   83yo man with PMH of HTN, CAD, DM2, Afib, COPD, OM (L Femur, on suppressive Cipro), recently admitted to [facility] for RVR, hospital course complicated by new L M3 CVA, PEG tube placed 5/15, subsequently discharged to [facility].   Pt now presenting from Rehab with decline in mental status currently not responding.   WBC 15-16k   In ED T 100.8 F  UA negative   Flu/RSV/COVID negative   CT chest/abd/p: bronchitis? otherwise negative   CT head: subacute infarcts     Most likely AMS 2' to a new stroke, but due to possible aspiration and recent hospitalization, on chronic cipro for left leg OM.     # AMS  # Acute and subacute stroke  # R/O aspiration / Bronchitis   # PEG in place  # Left femur OM    - Blood cultures x 4 NGTD   - MRI of head with a new stroke   - Continue zosyn 3.375gm q8 for now   - Monitor WBC, today higher 17  Prognosis poor    Will follow.  D/W wife and son at the bedside.     Brandi العلي MD  Division of Infectious Diseases   Please call ID service at [phone] with any question.    50 minutes spent on total encounter assessing patient, examination, chart review, counseling and coordinating care by the attending physician/nurse/care manager.

## 2024-05-29 NOTE — CONSULT NOTE ADULT - CONSULT REASON
AMS
AMS
dm2 uncontrolled
Hypernatremia, MERRILL
AMS/ Hypercapnic respiratory failure
hx of CVA, lethargy
Dizziness
jacy  asthma  cva  ams

## 2024-05-29 NOTE — CONSULT NOTE ADULT - ASSESSMENT
85yo M PMHx AF (on Eliquis), CAD (s/p multiple stents), HTN, DM, HLD, COPD, OM (L Femur, on Cipro), recently admitted to [facility] for RVR, hospital course complicated by new L M3 CVA, PEG tube placed 5/15, subsequently discharged to [facility]. Now presenting from rehab and admitted for new AMS, new B/L ischemic strokes.   # AMS  # Acute on chronic hypercapnic respiratory failure  # CVA    Recommendations:   - Ordered stat duoneb and increased frequency of albuterol given extensive COPD history and current wheezing   - Placed on BiPAP for hypercapnic respiratory failure  - Repeat ABG ordered  - Will reevaluate for effectiveness of these interventions  - Pt is DNR/DNI    Discussed with Dr. Sidhu and medicine team.     Critical Care Time (EXCLUSIVE of any non bundled procedures) :  30 minutes were spent assessing the patient's presenting problems of acute illness that pose a high probability of life threatening  deterioration or end organ damage / dysfunction.  Medical decision making includes initiation / continuation of plan or care review data/ labwork/ radiographic study, direct patient care at bedside, discussions with consultants regarding care, evaluation and interpretation of vital signs, any necessary ventilator management, NIV or BIPAP changes or initiation, discussions with multidisciplinary team,  am or pm rounds, discussions of goals of care with patient and family, all non-inclusive of procedures.    Date of entry of this note is equal to the date of services rendered

## 2024-05-29 NOTE — PROGRESS NOTE PEDS - PROBLEM SELECTOR PLAN 1
- Severe sepsis present on admission with T 100.8, , RR 26, Lactate 2.2 on admission  -CT AP: Layering secretions in the lower trachea and bilateral proximal mainstem bronchi with scattered opacification of distal branching lobar and segmental airways bilaterally and with peribronchial wall thickening in lower lobes suggesting bronchitis. No lung consolidation. No source of infection identified in the abdomen or pelvis.  - UCx, BCx both NGTD x48 hrs  -Pts WBC remains elevated but has been afebrile throughout admission.  - Possible source from L Femur OM, as patient's cipro was held at Rehab since prior discharge for unknown reason  -Other possible source is aspiration PNA as pt with layering srecretions in the trachei/BL mainsteam bronchi with other CT findings as above  - ID Dr. العلي consulted, started on zosyn

## 2024-05-29 NOTE — PROGRESS NOTE ADULT - PROBLEM SELECTOR PLAN 2
hx of CVA s/p PEG  MRI showing new stroke  neurology following  poor mental status today with respiratory failure

## 2024-05-29 NOTE — CONSULT NOTE ADULT - CONSULT REQUESTED DATE/TIME
29-May-2024 13:00
27-May-2024 11:55
28-May-2024 15:45
29-May-2024 13:00
27-May-2024
27-May-2024 08:44
27-May-2024 13:51

## 2024-05-30 NOTE — PROGRESS NOTE ADULT - PROBLEM SELECTOR PLAN 9
Chronic  -On BiPAP as pt retaining pCO2  -PCO2 improved  -S/p duonebs, dex  -Montelukast, symbicort and spiriva ordered  -Metolazone ordered via peg to control excess secretions Chronic  -On BiPAP as pt retaining pCO2  -PCO2 improved  -S/p duonebs, dex  -Montelukast, Symbicort and Spiriva ordered  -Metolazone ordered via peg to control excess secretions

## 2024-05-30 NOTE — PROGRESS NOTE ADULT - PROBLEM SELECTOR PLAN 1
- Severe sepsis present on admission with T 100.8, , RR 26, Lactate 2.2 on admission  -CT AP: Layering secretions in the lower trachea and bilateral proximal mainstem bronchi with scattered opacification of distal branching lobar and segmental airways bilaterally and with peribronchial wall thickening in lower lobes suggesting bronchitis. No lung consolidation. No source of infection identified in the abdomen or pelvis.  - UCx, BCx both NGTD x72hrs  -Pts WBC remains elevated but has been afebrile throughout admission.  - Possible source from L Femur OM, as patient's cipro was held at Rehab since prior discharge for unknown reason  -Other possible source is aspiration PNA as pt with layering srecretions in the trachei/BL mainsteam bronchi with other CT findings as above  - ID Dr. العلي consulted, Continue zosyn 3.375gm q8 for now - Severe sepsis present on admission with T 100.8, , RR 26, Lactate 2.2 on admission  -CT AP: Layering secretions in the lower trachea and bilateral proximal mainstem bronchi with scattered opacification of distal branching lobar and segmental airways bilaterally and with peribronchial wall thickening in lower lobes suggesting bronchitis. No lung consolidation. No source of infection identified in the abdomen or pelvis.  - UCx, BCx both NGTD x72hrs  -Pts WBC remains elevated but has been afebrile throughout admission.  -Pt source of infection is likely aspiration PNA as pt with layering srecretions in the trachei/BL mainsteam bronchi with other CT findings as above, and his declining mental status.  - ID Dr. العلي consulted, Continue zosyn 3.375gm q8 for now - Severe sepsis present on admission with T 100.8, , RR 26, Lactate 2.2 on admission  -CT AP: Layering secretions in the lower trachea and bilateral proximal mainstem bronchi with scattered opacification of distal branching lobar and segmental airways bilaterally and with peribronchial wall thickening in lower lobes suggesting bronchitis. No lung consolidation. No source of infection identified in the abdomen or pelvis.  - UCx, BCx both NGTD x72hrs  -Pts WBC remains elevated but has been afebrile throughout admission.  -Pt source of infection is likely aspiration PNA as pt with layering secretions in the trachei/BL mainsteam bronchi with other CT findings as above, and his declining mental status.  - ID Dr. العلي consulted, Continue zosyn 3.375gm q8 day 3

## 2024-05-30 NOTE — PROGRESS NOTE ADULT - TREATMENT GUIDELINE COMMENT
continue medical management
family to consider transition to CMO on 5/31.
continue medical management  ongoing GOC

## 2024-05-30 NOTE — PROGRESS NOTE ADULT - SUBJECTIVE AND OBJECTIVE BOX
[facility]  INFECTIOUS DISEASES   [address] [place] [address]  Tel: [phone]     Fax: [phone]  ========================================================  MD Noman Perry Kaushal, MD Cho, Michelle, MD Sunjit, Jaspal, MD  ========================================================    [id number]  YUE Largo     Follow up: Stroke and fever    Patient is not responding, now on BiPAP. Family at the bedside.  They don't want to withdraw any care at this time before discussing with him. No fever.    PAST MEDICAL & SURGICAL HISTORY:  Diabetes Mellitus, Type II  CAD (Coronary Artery Disease)  s/p 3v CABG ; stents placed in [place] in   Dyslipidemia  Osteomyelitis  COPD (chronic obstructive pulmonary disease)  on 2L at home and BiPAP at night; intubated   Hypertension  PVD (peripheral vascular disease)  History of PAT (paroxysmal atrial tachycardia)  Asthma with COPD  BPH (benign prostatic hyperplasia)  Acute osteomyelitis  CABG (Coronary Artery Bypass Graft)    Compound fracture  left leg  S/P primary angioplasty with coronary stent  H/O drainage of abscess  Left femur 2021    Social Hx: No current smoking, EtOH or drugs     FAMILY HISTORY:  Family history of diabetes mellitus (Sibling)    Family hx of lung cancer  brother,  age 82, used to smoke with pt    Allergies  No Known Allergies    MEDICATIONS  (STANDING):  acetaminophen     Tablet .. 650 milliGRAM(s) Oral every 6 hours  albuterol    90 MICROgram(s) HFA Inhaler 2 Puff(s) Inhalation every 6 hours  aMIOdarone    Tablet 200 milliGRAM(s) Oral daily  atorvastatin 80 milliGRAM(s) Oral at bedtime  budesonide 160 MICROgram(s)/formoterol 4.5 MICROgram(s) Inhaler 2 Puff(s) Inhalation two times a day  dextrose 10% Bolus 125 milliLiter(s) IV Bolus once  dextrose 5%. 1000 milliLiter(s) (100 mL/Hr) IV Continuous <Continuous>  dextrose 5%. 1000 milliLiter(s) (50 mL/Hr) IV Continuous <Continuous>  dextrose 50% Injectable 12.5 Gram(s) IV Push once  dextrose 50% Injectable 25 Gram(s) IV Push once  glucagon  Injectable 1 milliGRAM(s) IntraMuscular once  heparin   Injectable 8000 Unit(s) IV Push once  heparin  Infusion.  Unit(s)/Hr (18 mL/Hr) IV Continuous <Continuous>  insulin glargine Injectable (LANTUS) 30 Unit(s) SubCutaneous at bedtime  insulin lispro (ADMELOG) corrective regimen sliding scale   SubCutaneous every 6 hours  insulin lispro Injectable (ADMELOG) 3 Unit(s) SubCutaneous every 6 hours  magnesium oxide 400 milliGRAM(s) Oral daily  metoprolol tartrate 25 milliGRAM(s) Oral two times a day  montelukast 10 milliGRAM(s) Oral at bedtime  piperacillin/tazobactam IVPB. 3.375 Gram(s) IV Intermittent once  piperacillin/tazobactam IVPB.- 3.375 Gram(s) IV Intermittent once  piperacillin/tazobactam IVPB.. 3.375 Gram(s) IV Intermittent every 8 hours  tiotropium 2.5 MICROgram(s) Inhaler 2 Puff(s) Inhalation daily  zinc sulfate 220 milliGRAM(s) Oral daily    MEDICATIONS  (PRN):  dextrose Oral Gel 15 Gram(s) Oral once PRN Blood Glucose LESS THAN 70 milliGRAM(s)/deciliter  heparin   Injectable 4000 Unit(s) IV Push every 6 hours PRN For aPTT between 40 - 57  heparin   Injectable 8000 Unit(s) IV Push every 6 hours PRN For aPTT less than 40     REVIEW OF SYSTEMS:  Unable     Physical Exam:  Vital Signs Last 24 Hrs  T(C): 36.9 (30 May 2024 11:16), Max: 37.1 (30 May 2024 05:04)  T(F): 98.5 (30 May 2024 11:16), Max: 98.7 (30 May 2024 05:04)  HR: 90 (30 May 2024 16:05) (81 - 809)  BP: 115/72 (30 May 2024 11:16) (110/67 - 127/70)  BP(mean): --  RR: 18 (30 May 2024 11:16) (18 - 20)  SpO2: 99% (30 May 2024 16:05) (96% - 99%)  Parameters below as of 30 May 2024 11:16  Patient On (Oxygen Delivery Method): BiPAP/CPAP  GEN: NAD lying bed on O2 with NC   HEENT: normocephalic and atraumatic.   NECK: Supple.  No lymphadenopathy   LUNGS: scattered rhonchi bilaterally   HEART: Irregular rate and rhythm  ABDOMEN: Soft, and nondistended. PEG in place looks fine  EXTREMITIES: Without edema. Left femur with scar and   fluctuation, no discharge from area   NEUROLOGIC: unable   PSYCHIATRIC: more awake and trying to communicate   SKIN: No rash       Labs:                        8.2    15.45 )-----------( 180      ( 30 May 2024 10:45 )             29.4     30    148<H>  |  108  |  54<H>  ----------------------------<  356<H>  4.2   |  42<H>  |  2.00<H>    Ca    9.1      30 May 2024 10:45  Phos  3.4       Mg     3.1         TPro  5.7<L>  /  Alb  1.9<L>  /  TBili  0.4  /  DBili  x   /  AST  48<H>  /  ALT  65  /  AlkPhos  108      Culture - Urine (collected 24 @ 17:49)  Source: Catheterized Catheterized  Final Report (24 @ 16:16):    No growth    Culture - Blood (collected 24 @ 17:00)  Source: .Blood Blood-Peripheral  Preliminary Report (24 @ 22:01):    No growth at 72 Hours    Culture - Blood (collected 24 @ 16:45)  Source: .Blood Blood-Peripheral  Preliminary Report (24 @ 22:01):    No growth at 72 Hours    WBC Count: 15.45 K/uL (24 @ 10:45)  WBC Count: 17.88 K/uL (24 @ 11:00)  WBC Count: 15.71 K/uL (24 @ 06:16)  WBC Count: 15.99 K/uL (24 @ 07:27)  WBC Count: 16.67 K/uL (24 @ 03:37)  WBC Count: 10.53 K/uL (24 @ 17:10)    Creatinine: 2.00 mg/dL (24 @ 10:45)  Creatinine: 1.70 mg/dL (24 @ 11:00)  Creatinine: 1.80 mg/dL (24 @ 06:16)  Creatinine: 1.60 mg/dL (24 @ 07:27)  Creatinine: 1.80 mg/dL (24 @ 17:10)     SARS-CoV-2 Result: NotDetec (24 @ 17:10)    All imaging and other data have been reviewed.  < from: CT Abdomen and Pelvis No Cont (24 @ 18:37) >  IMPRESSION:  Layering secretions in the lower trachea and bilateral proximal mainstem   bronchi with scattered opacification of distal branching lobar and   segmental airways bilaterally and with peribronchial wall thickening in   lower lobes suggesting bronchitis. No lung consolidation.  No source of infection identified in the abdomen or pelvis.    CT head :  IMPRESSION:  1.  Gyriform hyperdensity in the region of a left frontal parietal   subacute infarct suggesting cortical laminar necrosis or hemorrhage.  2.  Small subacute high right frontal infarct. Subacute bilateral   cerebellar infarcts.    Assessment and Plan:   85yo man with PMH of HTN, CAD, DM2, Afib, COPD, OM (L Femur, on suppressive Cipro), [facility], hospital course complicated by new L M3 CVA, PEG tube placed 5/15, subsequently discharged to [facility].   Pt now presenting from Rehab with decline in mental status currently not responding.   WBC 15-16k   In ED T 100.8 F  UA negative   Flu/RSV/COVID negative   CT chest/abd/p: bronchitis? otherwise negative   CT head: subacute infarcts     Most likely AMS 2' to a new stroke, but due to possible aspiration and recent hospitalization, on chronic cipro for left leg OM.     # AMS  # Acute and subacute stroke  # R/O aspiration / Bronchitis   # PEG in place  # Left femur OM    - Blood cultures NGTD   - MRI of head with a new stroke   - Continue zosyn 3.375gm q8 for now, if family wants to continue can complete at least 5days   - Monitor WBC, today 17-->15  - Palliative care following, awating family decision about comfort measures   Prognosis poor    Will follow PRN.     Brandi العلي MD  Division of Infectious Diseases   Please call ID service at [phone] with any question.    50 minutes spent on total encounter assessing patient, examination, chart review, counseling and coordinating care by the attending physician/nurse/care manager.

## 2024-05-30 NOTE — PROGRESS NOTE ADULT - CONVERSATION DETAILS
Met with wife at bedside today, states patient more awake and she spoke with neurologist re: CT scan, no change.  Aware still pending MRI but per her conversation with neurology she says "they don't think he had another stroke." Wife hopeful for ongoing improvement with medical management of infection- she understands work up ongoing to identify source. She wants to continue medical management in hopes that patient can return to previous baseline. Also requests to speak with SW re: dispo options when closer to discharge as she would like to identify a different facility for ongoing are. ongoing availability assured.
Met with family on this date  explained clinical situation- multiple CVAs, debility, respiratory failure with hx COPD and CO2 narcosis despite bipap for extended period of time. Discussed options for care which include ongoing medical management vs. transition in level of care  reviewed palliative care and hospice terms- but as they pertain to patient, explained that patient likely in dying process.  Expressed concern over patients potential suffering and understanding what matters most. Discussed what transition to symptom directed care would look like including deescalation of bipap, antibiotics, finger sticks, cardiac monitoring and artificial means of prolonging life and replacing that with medications to help with work of breathing, pain, anxiety, etc. Discussed that CMO would likely take place first in the hospital, and depending on clinical stability, potentially at an inpatient hospice facility based on hemodynamics and how pt does when transition is made. information on inpatient hospice provided, but given current use of bipap and lack of ATC IV meds for symptoms, patient not currently a candidate. Family verbalized understanding and states they will let our team know tomorrow.
Met with spouse on this date as patient with clinical decompensation, respiratory failure, placed on NRB  explained that patients work of breathing appears uncomfortable and this may be the result of several things- poor mental status at baseline now with new CVA (on MRI), aspiration, sepsis, or multifactorial.  Explained that this current work of breathing is unlikely to be sustained well, but would recommend a conversation about ongoing medical management vs. transition in focus of care to comfort.  Spouse wished to include her children in conversation.    Met again with spouse and son, reiterated above conversation. patient was pending repeat CT scan of brain. I explained that irrespective of what scan shows us, patients breathing is unlikely to change and without intervention, may not be able to be sustained. reiterated wishes for DNR/DNI status and explained that this will be honored, but family should consider ongoing medical management vs. comfort directed approach as further escalation of care may not be comfortable for patient, and neither is ongoing work of breathing at current state. family requests time for repeat imaging.

## 2024-05-30 NOTE — PROGRESS NOTE ADULT - PROBLEM SELECTOR PLAN 4
will follow  discussed with Dr. Will Banerjee MD, [facility]; Palliative Care Attending, Ethicist. [phone] Melolabial Interpolation Flap Division And Inset Text: Division and inset of the melolabial interpolation flap was performed to achieve optimal aesthetic result, restore normal anatomic appearance and avoid distortion of normal anatomy, expedite and facilitate wound healing, achieve optimal functional result and because linear closure either not possible or would produce suboptimal result. The patient was prepped and draped in the usual manner. The pedicle was infiltrated with local anesthesia. The pedicle was sectioned with a #15 blade. The pedicle was de-bulked and trimmed to match the shape of the defect. Hemostasis was achieved. The flap donor site and free margin of the flap were secured with deep buried sutures and the wound edges were re-approximated.

## 2024-05-30 NOTE — PROGRESS NOTE ADULT - PROBLEM SELECTOR PLAN 2
- New bilateral CVA's, hx Left M3 infarct  - CT Head No Con: 1.  Gyriform hyperdensity in the region of a left frontal parietal subacute infarct suggesting cortical laminar necrosis or hemorrhage. 2.  Small subacute high right frontal infarct. Subacute bilateral cerebellar infarcts.  - Out of window for thrombolytic therapy  - Heparin gtt  - repeat CT stable.   -MR head: New posterior left corona radiata/frontal lobe infarct.  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 Petechial hemorrhage in the left parietal lobe. Bilateral areas of recent  microhemorrhages.  -Repeat CT head 3/29: Degraded by motion. Stable small to moderate subacute posterior left frontal infarct with  mild associated petechial hemorrhage.  -Eliquis 2.5mg BID resumed on 5/29 per neurology recs  -Acetazolomide ordered for metabolic alkalosis  - Neuro check q4h  - C/w high-dose statin  - Dr. Underwood Neurology consulted - New bilateral CVA's, hx Left M3 infarct  - Pt was out of window for thrombolytic therapy  - s/p Heparin gtt   - CT Head No Con: 1.  Gyriform hyperdensity in the region of a left frontal parietal subacute infarct suggesting cortical laminar necrosis or hemorrhage. 2.  Small subacute high right frontal infarct. Subacute bilateral cerebellar infarcts.  -MR head: New posterior left corona radiata/frontal lobe infarct.  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 Petechial hemorrhage in the left parietal lobe. Bilateral areas of recent  microhemorrhages.  -Repeat CT head 3/29: Degraded by motion. Stable small to moderate subacute posterior left frontal infarct with  mild associated petechial hemorrhage.  -Eliquis 2.5mg BID resumed on 5/29 per neurology recs as CT head is stable  -Acetazolamide ordered for metabolic alkalosis  - Neuro check q4h  - C/w high-dose statin  - Dr. Underwood Neurology consulted - New bilateral CVA's, hx Left M3 infarct  - Pt was out of window for thrombolytic therapy  - s/p Heparin gtt  / Eliquis   - CT Head No Con: 1.  Gyriform hyperdensity in the region of a left frontal parietal subacute infarct suggesting cortical laminar necrosis or hemorrhage. 2.  Small subacute high right frontal infarct. Subacute bilateral cerebellar infarcts.  -MR head: New posterior left corona radiata/frontal lobe infarct.  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 Petechial hemorrhage in the left parietal lobe. Bilateral areas of recent  microhemorrhages.  -Repeat CT head 3/29: Degraded by motion. Stable small to moderate subacute posterior left frontal infarct with  mild associated petechial hemorrhage.  -Eliquis 2.5mg BID resumed on 5/29 per neurology recs as CT head is stable  -Acetazolamide ordered for metabolic alkalosis  - Neuro check q4h  - C/w high-dose statin  - Dr. Underwood Neurology consulted

## 2024-05-30 NOTE — PROGRESS NOTE ADULT - SUBJECTIVE AND OBJECTIVE BOX
Patient is a 84y old  Male who presents with a chief complaint of AMS, CVA (30 May 2024 05:28)      INTERVAL HPI/OVERNIGHT EVENTS: Pt seen and examined at bedside. Pt remains on BiPAP with improvement of PCO2 on repeat ABG. Pt remains unresponsive to verbal stimuli and sternal rub.     MEDICATIONS  (STANDING):  acetaminophen     Tablet .. 650 milliGRAM(s) Oral every 6 hours  aMIOdarone    Tablet 200 milliGRAM(s) Oral daily  apixaban 2.5 milliGRAM(s) Oral every 12 hours  atorvastatin 80 milliGRAM(s) Oral at bedtime  budesonide 160 MICROgram(s)/formoterol 4.5 MICROgram(s) Inhaler 2 Puff(s) Inhalation two times a day  dextrose 10% Bolus 125 milliLiter(s) IV Bolus once  dextrose 5%. 1000 milliLiter(s) (100 mL/Hr) IV Continuous <Continuous>  dextrose 5%. 1000 milliLiter(s) (50 mL/Hr) IV Continuous <Continuous>  dextrose 5%. 1000 milliLiter(s) (75 mL/Hr) IV Continuous <Continuous>  dextrose 50% Injectable 12.5 Gram(s) IV Push once  dextrose 50% Injectable 25 Gram(s) IV Push once  glucagon  Injectable 1 milliGRAM(s) IntraMuscular once  insulin glargine Injectable (LANTUS) 36 Unit(s) SubCutaneous at bedtime  insulin lispro (ADMELOG) corrective regimen sliding scale   SubCutaneous <User Schedule>  magnesium oxide 400 milliGRAM(s) Oral daily  metolazone 2.5 milliGRAM(s) Oral daily  metoprolol tartrate 25 milliGRAM(s) Oral two times a day  montelukast 10 milliGRAM(s) Oral at bedtime  piperacillin/tazobactam IVPB.. 3.375 Gram(s) IV Intermittent every 8 hours  tiotropium 2.5 MICROgram(s) Inhaler 2 Puff(s) Inhalation daily  zinc sulfate 220 milliGRAM(s) Oral daily    MEDICATIONS  (PRN):  albuterol    0.083% 2.5 milliGRAM(s) Nebulizer every 2 hours PRN Shortness of Breath and/or Wheezing  dextrose Oral Gel 15 Gram(s) Oral once PRN Blood Glucose LESS THAN 70 milliGRAM(s)/deciliter      Allergies    No Known Allergies    Intolerances        REVIEW OF SYSTEMS:  Unable to assess 2/2 AMS.    Vital Signs Last 24 Hrs  T(C): 37.1 (30 May 2024 05:04), Max: 37.1 (30 May 2024 05:04)  T(F): 98.7 (30 May 2024 05:04), Max: 98.7 (30 May 2024 05:04)  HR: 86 (30 May 2024 07:59) (81 - 809)  BP: 112/70 (30 May 2024 05:04) (110/67 - 127/70)  BP(mean): --  RR: 18 (30 May 2024 05:04) (18 - 20)  SpO2: 98% (30 May 2024 07:59) (96% - 99%)    Parameters below as of 30 May 2024 05:04  Patient On (Oxygen Delivery Method): BiPAP/CPAP        PHYSICAL EXAM:  GENERAL: ill-appearing, elderly male, does not respond to painful or verbal stimuli  HEENT:  NC/AT, unable to assess EOM, anicteric, dry mucous membranes, +BiPAP in place  CHEST/LUNG:  Coarse breath sounds bilaterally.   HEART:  RRR, S1, S2  ABDOMEN:  BS+, soft, nontender, nondistended. PEG tube c/d/i  MUSCULOSKELETAL: no edema, cyanosis, or calf tenderness  NEUROLOGIC: AAOX0. Does not respond to commands for strength / neuro assessment. pt noted to move LUE spontaneously.  : External catheter    LABS:      LABS:                        9.0    17.88 )-----------( 196      ( 29 May 2024 11:00 )             32.5     CBC Full  -  ( 29 May 2024 11:00 )  WBC Count : 17.88 K/uL  Hemoglobin : 9.0 g/dL  Hematocrit : 32.5 %  Platelet Count - Automated : 196 K/uL  Mean Cell Volume : 97.6 fl  Mean Cell Hemoglobin : 27.0 pg  Mean Cell Hemoglobin Concentration : 27.7 gm/dL  Auto Neutrophil # : 15.20 K/uL  Auto Lymphocyte # : 0.72 K/uL  Auto Monocyte # : 1.61 K/uL  Auto Eosinophil # : 0.00 K/uL  Auto Basophil # : 0.18 K/uL  Auto Neutrophil % : 85.0 %  Auto Lymphocyte % : 4.0 %  Auto Monocyte % : 9.0 %  Auto Eosinophil % : 0.0 %  Auto Basophil % : 1.0 %    29 May 2024 11:00    149    |  110    |  48     ----------------------------<  356    4.5     |  43     |  1.70     Ca    8.8        29 May 2024 11:00  Phos  2.8       29 May 2024 11:00  Mg     2.7       29 May 2024 11:00    TPro  5.9    /  Alb  2.0    /  TBili  0.4    /  DBili  x      /  AST  72     /  ALT  90     /  AlkPhos  167    29 May 2024 11:00    PTT - ( 28 May 2024 14:10 )  PTT:43.8 sec  Urinalysis Basic - ( 29 May 2024 11:00 )    Color: x / Appearance: x / SG: x / pH: x  Gluc: 356 mg/dL / Ketone: x  / Bili: x / Urobili: x   Blood: x / Protein: x / Nitrite: x   Leuk Esterase: x / RBC: x / WBC x   Sq Epi: x / Non Sq Epi: x / Bacteria: x      CAPILLARY BLOOD GLUCOSE      POCT Blood Glucose.: 296 mg/dL (29 May 2024 21:46)  POCT Blood Glucose.: 342 mg/dL (29 May 2024 18:01)  POCT Blood Glucose.: 326 mg/dL (29 May 2024 13:00)  POCT Blood Glucose.: 347 mg/dL (29 May 2024 12:58)        Culture - Urine (collected 05-26-24 @ 17:49)  Source: Catheterized Catheterized  Final Report (05-27-24 @ 16:16):    No growth    Culture - Blood (collected 05-26-24 @ 17:00)  Source: .Blood Blood-Peripheral  Preliminary Report (05-29-24 @ 22:01):    No growth at 72 Hours    Culture - Blood (collected 05-26-24 @ 16:45)  Source: .Blood Blood-Peripheral  Preliminary Report (05-29-24 @ 22:01):    No growth at 72 Hours        RADIOLOGY & ADDITIONAL TESTS:    Personally reviewed.     Consultant(s) Notes Reviewed:  [x] YES  [ ] NO     Patient is a 84y old  Male who presents with a chief complaint of AMS, CVA (30 May 2024 05:28)      INTERVAL HPI/OVERNIGHT EVENTS: Pt seen and examined at bedside. Pt remains on BiPAP with improvement of PCO2 on repeat ABG. Pt remains unresponsive to verbal stimuli and sternal rub.     MEDICATIONS  (STANDING):  acetaminophen     Tablet .. 650 milliGRAM(s) Oral every 6 hours  aMIOdarone    Tablet 200 milliGRAM(s) Oral daily  apixaban 2.5 milliGRAM(s) Oral every 12 hours  atorvastatin 80 milliGRAM(s) Oral at bedtime  budesonide 160 MICROgram(s)/formoterol 4.5 MICROgram(s) Inhaler 2 Puff(s) Inhalation two times a day  dextrose 10% Bolus 125 milliLiter(s) IV Bolus once  dextrose 5%. 1000 milliLiter(s) (100 mL/Hr) IV Continuous <Continuous>  dextrose 5%. 1000 milliLiter(s) (50 mL/Hr) IV Continuous <Continuous>  dextrose 5%. 1000 milliLiter(s) (75 mL/Hr) IV Continuous <Continuous>  dextrose 50% Injectable 12.5 Gram(s) IV Push once  dextrose 50% Injectable 25 Gram(s) IV Push once  glucagon  Injectable 1 milliGRAM(s) IntraMuscular once  insulin glargine Injectable (LANTUS) 36 Unit(s) SubCutaneous at bedtime  insulin lispro (ADMELOG) corrective regimen sliding scale   SubCutaneous <User Schedule>  magnesium oxide 400 milliGRAM(s) Oral daily  metolazone 2.5 milliGRAM(s) Oral daily  metoprolol tartrate 25 milliGRAM(s) Oral two times a day  montelukast 10 milliGRAM(s) Oral at bedtime  piperacillin/tazobactam IVPB.. 3.375 Gram(s) IV Intermittent every 8 hours  tiotropium 2.5 MICROgram(s) Inhaler 2 Puff(s) Inhalation daily  zinc sulfate 220 milliGRAM(s) Oral daily    MEDICATIONS  (PRN):  albuterol    0.083% 2.5 milliGRAM(s) Nebulizer every 2 hours PRN Shortness of Breath and/or Wheezing  dextrose Oral Gel 15 Gram(s) Oral once PRN Blood Glucose LESS THAN 70 milliGRAM(s)/deciliter      Allergies    No Known Allergies    Intolerances        REVIEW OF SYSTEMS:  Unable to assess 2/2 AMS.    Vital Signs Last 24 Hrs  T(C): 37.1 (30 May 2024 05:04), Max: 37.1 (30 May 2024 05:04)  T(F): 98.7 (30 May 2024 05:04), Max: 98.7 (30 May 2024 05:04)  HR: 86 (30 May 2024 07:59) (81 - 809)  BP: 112/70 (30 May 2024 05:04) (110/67 - 127/70)  BP(mean): --  RR: 18 (30 May 2024 05:04) (18 - 20)  SpO2: 98% (30 May 2024 07:59) (96% - 99%)    Parameters below as of 30 May 2024 05:04  Patient On (Oxygen Delivery Method): BiPAP/CPAP        PHYSICAL EXAM:  GENERAL: ill-appearing, elderly male, does not respond to painful or verbal stimuli  HEENT:  NC/AT, unable to assess EOM, anicteric, dry mucous membranes, +BiPAP in place  CHEST/LUNG:  Coarse breath sounds bilaterally.   HEART:  RRR, S1, S2 , no tachy   ABDOMEN:  BS+, soft, nontender, nondistended. PEG tube c/d/i  MUSCULOSKELETAL: no edema, cyanosis, or calf tenderness  NEUROLOGIC: AAOX0. Does not respond to commands for strength / neuro assessment. pt noted to move LUE spontaneously.  : External catheter    LABS:      LABS:                        9.0    17.88 )-----------( 196      ( 29 May 2024 11:00 )             32.5     CBC Full  -  ( 29 May 2024 11:00 )  WBC Count : 17.88 K/uL  Hemoglobin : 9.0 g/dL  Hematocrit : 32.5 %  Platelet Count - Automated : 196 K/uL  Mean Cell Volume : 97.6 fl  Mean Cell Hemoglobin : 27.0 pg  Mean Cell Hemoglobin Concentration : 27.7 gm/dL  Auto Neutrophil # : 15.20 K/uL  Auto Lymphocyte # : 0.72 K/uL  Auto Monocyte # : 1.61 K/uL  Auto Eosinophil # : 0.00 K/uL  Auto Basophil # : 0.18 K/uL  Auto Neutrophil % : 85.0 %  Auto Lymphocyte % : 4.0 %  Auto Monocyte % : 9.0 %  Auto Eosinophil % : 0.0 %  Auto Basophil % : 1.0 %    29 May 2024 11:00    149    |  110    |  48     ----------------------------<  356    4.5     |  43     |  1.70     Ca    8.8        29 May 2024 11:00  Phos  2.8       29 May 2024 11:00  Mg     2.7       29 May 2024 11:00    TPro  5.9    /  Alb  2.0    /  TBili  0.4    /  DBili  x      /  AST  72     /  ALT  90     /  AlkPhos  167    29 May 2024 11:00    PTT - ( 28 May 2024 14:10 )  PTT:43.8 sec  Urinalysis Basic - ( 29 May 2024 11:00 )    Color: x / Appearance: x / SG: x / pH: x  Gluc: 356 mg/dL / Ketone: x  / Bili: x / Urobili: x   Blood: x / Protein: x / Nitrite: x   Leuk Esterase: x / RBC: x / WBC x   Sq Epi: x / Non Sq Epi: x / Bacteria: x      CAPILLARY BLOOD GLUCOSE      POCT Blood Glucose.: 296 mg/dL (29 May 2024 21:46)  POCT Blood Glucose.: 342 mg/dL (29 May 2024 18:01)  POCT Blood Glucose.: 326 mg/dL (29 May 2024 13:00)  POCT Blood Glucose.: 347 mg/dL (29 May 2024 12:58)        Culture - Urine (collected 05-26-24 @ 17:49)  Source: Catheterized Catheterized  Final Report (05-27-24 @ 16:16):    No growth    Culture - Blood (collected 05-26-24 @ 17:00)  Source: .Blood Blood-Peripheral  Preliminary Report (05-29-24 @ 22:01):    No growth at 72 Hours    Culture - Blood (collected 05-26-24 @ 16:45)  Source: .Blood Blood-Peripheral  Preliminary Report (05-29-24 @ 22:01):    No growth at 72 Hours        RADIOLOGY & ADDITIONAL TESTS:    Personally reviewed.     Consultant(s) Notes Reviewed:  [x] YES  [ ] NO

## 2024-05-30 NOTE — PROGRESS NOTE ADULT - PROBLEM SELECTOR PLAN 4
- Hx T2DM with hyperglycemia > 400   -Lantus 36 u subq at bedtime standing  -Sliding scale to reflect times feeds  - Hypoglycemia protocol  - On PEG Tube feeding - Hx T2DM with hyperglycemia > 400   -Lantus 36 u subq at bedtime standing  -Sliding scale to reflect times feeds  - Hypoglycemia protocol  - On PEG Tube feeding  -Pt POCT elevated today likely 2/2 decadron yesterday, will monitor

## 2024-05-30 NOTE — PROGRESS NOTE ADULT - PROBLEM SELECTOR PLAN 2
hx of CVA s/p PEG  MRI showing new stroke  neurology following  poor mental status today ongoing with respiratory failure despite bipap- hx COPD

## 2024-05-30 NOTE — PROGRESS NOTE ADULT - PROBLEM SELECTOR PLAN 3
Cr baseline appears to be 1.3 per chart review   -Likely pre-renal 2/2 dec PO intake  -Pt remains on d5 IVF   -on tube feeds with free water Cr baseline appears to be 1.3 per chart review   -Likely pre-renal 2/2 dec PO intake  -Pt remains on d5 IVF @ 75cc/hr  -on tube feeds with free water

## 2024-05-30 NOTE — PROGRESS NOTE ADULT - SUBJECTIVE AND OBJECTIVE BOX
HPI:  85yo M PMHx AF (on Eliquis), CAD (s/p multiple stents), HTN, DM, HLD, COPD, OM (L Femur, on Cipro), recently admitted to [facility] for RVR, hospital course complicated by new L M3 CVA, PEG tube placed 5/15, subsequently discharged to [facility]. Pt now presenting from Rehab with decline in mental status per wife Sania, at bedside. History obtained entirely from wife, as pt unable to speak presently. States she initially noticed him being "nonresponsive," noncommunicative on yesterday in the rehab, with no change into today. States that before this he did have slurred speech with waxing and waning dysarthria, however was baseline alert and oriented x3. Was also baseline with minimal to no motor function of RLE, RUE, with diminished motor function of LUE, LLE, was requiring assistance in all activities. Of note, wife states she noticed patient was not on Ciprofloxacin (chronic med for OM) while in rehab, and inquired as to why. No answer as to why, however patient was restarted on Cipro in Rehab.    ED Course:  Vitals: T 100.8 F, , BP 95/59, RR 26, SpO2 93% on 2L  Given: Ofirmev x1, Rocephin x1, 1L NS Bolus x2  Pertinent Labs: WBC 10.5, Na 150, HCO3 42, BUN/Cr 50/1.8, Glu 414. Mg 2.8. Lactate 2.2 --> 1.5. HsTi 185.6. Pro-.  AB.44 / 65 / 89 / 44    Imaging:  CT C/A/P No con: Layering secretions in the lower trachea and bilateral proximal mainstem  bronchi with scattered opacification of distal branching lobar and segmental airways bilaterally and with peribronchial wall thickening in lower lobes suggesting bronchitis. No lung consolidation. No source of infection identified in the abdomen or pelvis. PEG tube in place. Scattered colonic diverticula.  CT Head No Con: 1.  Gyriform hyperdensity in the region of a left frontal parietal subacute infarct suggesting cortical laminar necrosis or hemorrhage. 2.  Small subacute high right frontal infarct. Subacute bilateral cerebellar infarcts.    EKG: Sinus Tachycardia w/PVC on personal read; Rate 105, QTc 452 (26 May 2024 21:02)    PERTINENT PM/SXH:   Diabetes Mellitus, Type II    CAD (Coronary Artery Disease)    Dyslipidemia    Asymptomatic Peripheral Vascular Disease    Osteomyelitis    COPD (chronic obstructive pulmonary disease)    Diabetes mellitus    Hypertension    PVD (peripheral vascular disease)    History of PAT (paroxysmal atrial tachycardia)    Asthma with COPD    BPH (benign prostatic hyperplasia)    Acute osteomyelitis      CABG (Coronary Artery Bypass Graft)    Compound fracture    S/P primary angioplasty with coronary stent    H/O drainage of abscess      FAMILY HISTORY:  Family history of diabetes mellitus (Sibling)    Family hx of lung cancer  brother,  age 82, used to smoke with pt      Family Hx substance abuse [ ]yes [ x]no  ITEMS NOT CHECKED ARE NOT PRESENT    SOCIAL HISTORY:   Significant other/partner[x ]  Children[ ]  Latter day/Spirituality:  Substance hx:  [ ]   Tobacco hx:  [ ]   Alcohol hx: [ ]   Home Opioid hx:  [ ] I-Stop Reference No:  Living Situation: [ ]Home  [ ]Long term care  [x ]Rehab [ ]Other    ADVANCE DIRECTIVES:    DNR/MOLST  [ ]  Living Will  [ ]   DECISION MAKER(s):  [ ] Health Care Proxy(s)  [x ] Surrogate(s)  [ ] Guardian           Name(s): Phone Number(s): Mercedes    BASELINE (I)ADL(s) (prior to admission):  [place]eves: [ ]Total  [ ] Moderate [ x]Dependent    Allergies    No Known Allergies    Intolerances    MEDICATIONS  (STANDING):  acetaminophen     Tablet .. 650 milliGRAM(s) Oral every 6 hours  aMIOdarone    Tablet 200 milliGRAM(s) Oral daily  apixaban 2.5 milliGRAM(s) Oral every 12 hours  atorvastatin 80 milliGRAM(s) Oral at bedtime  budesonide 160 MICROgram(s)/formoterol 4.5 MICROgram(s) Inhaler 2 Puff(s) Inhalation two times a day  dextrose 10% Bolus 125 milliLiter(s) IV Bolus once  dextrose 5%. 1000 milliLiter(s) (100 mL/Hr) IV Continuous <Continuous>  dextrose 5%. 1000 milliLiter(s) (50 mL/Hr) IV Continuous <Continuous>  dextrose 5%. 1000 milliLiter(s) (65 mL/Hr) IV Continuous <Continuous>  dextrose 50% Injectable 12.5 Gram(s) IV Push once  dextrose 50% Injectable 25 Gram(s) IV Push once  glucagon  Injectable 1 milliGRAM(s) IntraMuscular once  insulin glargine Injectable (LANTUS) 36 Unit(s) SubCutaneous at bedtime  insulin lispro (ADMELOG) corrective regimen sliding scale   SubCutaneous every 6 hours  magnesium oxide 400 milliGRAM(s) Oral daily  metolazone 2.5 milliGRAM(s) Oral daily  metoprolol tartrate 25 milliGRAM(s) Oral two times a day  piperacillin/tazobactam IVPB.. 3.375 Gram(s) IV Intermittent every 8 hours  tiotropium 2.5 MICROgram(s) Inhaler 2 Puff(s) Inhalation daily  zinc sulfate 220 milliGRAM(s) Oral daily    MEDICATIONS  (PRN):  albuterol    0.083% 2.5 milliGRAM(s) Nebulizer every 2 hours PRN Shortness of Breath and/or Wheezing  dextrose Oral Gel 15 Gram(s) Oral once PRN Blood Glucose LESS THAN 70 milliGRAM(s)/deciliter    PRESENT SYMPTOMS: [ x]Unable to self-report  [ ] CPOT [x ] PAINADs [x ] RDOS  Source if other than patient:  [ ]Family   [ ]Team     Pain: [ ]yes [ ]no  QOL impact -   Location -                    Aggravating factors -  Quality -  Radiation -  Timing-  Severity (0-10 scale):  Minimal acceptable level (0-10 scale):     CPOT:    https://www.sccm.org/getattachment/lvm64p50-0l7f-0f1p-4d8c-8397y9987r4k/Critical-Care-Pain-Observation-Tool-(CPOT)    PAIN AD Score:   http://geriatrictoolkit.[place].[facility]/cog/painad.pdf (press ctrl +  left click to view)    Dyspnea:                           [ ]Mild [ ]Moderate [ ]Severe      RDOS:  0 to 2  minimal or no respiratory distress   3  mild distress  4 to 6 moderate distress  >7 severe distress  https://homecareinformation.net/handouts/hen/Respiratory_Distress_Observation_Scale.pdf (Ctrl +  left click to view)     Anxiety:                             [ ]Mild [ ]Moderate [ ]Severe  Fatigue:                             [ ]Mild [ ]Moderate [ ]Severe  Nausea:                             [ ]Mild [ ]Moderate [ ]Severe  Loss of appetite:              [ ]Mild [ ]Moderate [ ]Severe  Constipation:                    [ ]Mild [ ]Moderate [ ]Severe    PCSSQ[Palliative Care Spiritual Screening Question]   Severity (0-10):  Score of 4 or > indicate consideration of Chaplaincy referral.  Chaplaincy Referral: [ ] yes [ ] refused [ ] following [x ] Deferred     Caregiver McNeal? : [ ] yes [ x] no [ ] Deferred [ ] Declined             Social work referral [ ] Patient & Family Centered Care Referral [ ]     Anticipatory Grief present?:  [x ] yes [ ] no  [ ] Deferred                  Social work referral [x ] Chaplaincy Referral[ ]  Palliative care SW present for conversation, emotional support provided    Other Symptoms:  [ ]All other review of systems negative     Palliative Performance Status Version 2:  20       %    http://npcrc.org/files/news/palliative_performance_scale_ppsv2.pdf  PHYSICAL EXAM:  Vital Signs Last 24 Hrs  T(C): 36.9 (30 May 2024 11:16), Max: 37.1 (30 May 2024 05:04)  T(F): 98.5 (30 May 2024 11:16), Max: 98.7 (30 May 2024 05:04)  HR: 90 (30 May 2024 16:05) (81 - 809)  BP: 115/72 (30 May 2024 11:16) (110/67 - 127/70)  BP(mean): --  RR: 18 (30 May 2024 11:16) (18 - 20)  SpO2: 99% (30 May 2024 16:05) (96% - 99%)    Parameters below as of 30 May 2024 11:16  Patient On (Oxygen Delivery Method): BiPAP/CPAP     I&O's Summary    29 May 2024 07:  -  30 May 2024 07:00  --------------------------------------------------------  IN: 1220 mL / OUT: 300 mL / NET: 920 mL    30 May 2024 07:01  -  30 May 2024 16:40  --------------------------------------------------------  IN: 0 mL / OUT: 450 mL / NET: -450 mL      GENERAL: [ ]Cachexia    [ ]Alert  [ ]Oriented x   [x ]Lethargic  [ ]Unarousable  [ ]Verbal  [ ]Non-Verbal  Behavioral:   [ ] Anxiety  [ ] Delirium [ ] Agitation [ ] Other  HEENT:  [ ]Normal   [ x]Dry mouth   [ ]ET Tube/Trach  [ ]Oral lesions  PULMONARY: NIPPV  [ ]Clear [x ]Tachypnea  [ ]Audible excessive secretions   [ ]Rhonchi        [ ]Right [ ]Left [ ]Bilateral  [ ]Crackles        [ ]Right [ ]Left [ ]Bilateral  [ ]Wheezing     [ ]Right [ ]Left [ ]Bilateral  [x ]Diminished breath sounds [ ]right [ ]left [ ]bilateral  CARDIOVASCULAR:    [x ]Regular [ ]Irregular [ ]Tachy  [ ]Dexter [ ]Murmur [ ]Other  GASTROINTESTINAL:  [x ]Soft  [ ]Distended   [ ]+BS  [ ]Non tender [ ]Tender  [ ]Other [ x]PEG [ ]OGT/ NGT  Last BM:  GENITOURINARY:  [ ]Normal [ x] Incontinent   [ ]Oliguria/Anuria   [ ]Gonzáles  MUSCULOSKELETAL:   [ ]Normal   [x ]Weakness  [x ]Bed/Wheelchair bound [ ]Edema  NEUROLOGIC:   [ ]No focal deficits  [ ]Cognitive impairment  [x ]Dysphagia [ ]Dysarthria [ ]Paresis [ ]Other   SKIN: chronic osteo LUE  [ ]Normal  [ ]Rash  [x ]Other  [ ]Pressure ulcer(s)       Present on admission [ ]y [ ]n    CRITICAL CARE:  [ ] Shock Present  [ ]Septic [ ]Cardiogenic [ ]Neurologic [ ]Hypovolemic  [ ]  Vasopressors [ ]  Inotropes   [x ]Respiratory failure present [ ]Mechanical ventilation [ x]Non-invasive ventilatory support [ ]High flow    [x ]Acute  [ ]Chronic [ ]Hypoxic  [x ]Hypercarbic [ ]Other  [ ]Other organ failure     LABS:                        8.2    15.45 )-----------( 180      ( 30 May 2024 10:45 )             29.4   05-30    148<H>  |  108  |  54<H>  ----------------------------<  356<H>  4.2   |  42<H>  |  2.00<H>    Ca    9.1      30 May 2024 10:45  Phos  3.4     05-30  Mg     3.1     05-30    TPro  5.7<L>  /  Alb  1.9<L>  /  TBili  0.4  /  DBili  x   /  AST  48<H>  /  ALT  65  /  AlkPhos  108  05-30      Urinalysis Basic - ( 30 May 2024 10:45 )    Color: x / Appearance: x / SG: x / pH: x  Gluc: 356 mg/dL / Ketone: x  / Bili: x / Urobili: x   Blood: x / Protein: x / Nitrite: x   Leuk Esterase: x / RBC: x / WBC x   Sq Epi: x / Non Sq Epi: x / Bacteria: x      RADIOLOGY & ADDITIONAL STUDIES:  < from: CT Head No Cont (24 @ 12:34) >    ACC: [id number] EXAM:  CT BRAIN   ORDERED BY: GIBRAN BOX     PROCEDURE DATE:  2024          INTERPRETATION:  CT HEAD    CLINICAL HISTORY: re eval for bleed    TECHNIQUE:  Noncontrast CT.  Axial Acquisition.  Sagittal and coronal reformations.    COMPARISON:  Exam is compared to head CT of 2024 and MRI of 2024    FINDINGS:  Exam is degraded by motion.  HEMORRHAGE/BRAIN PARENCHYMA:  Mild to moderate atrophy. Stable small to   moderate subacute left posterior frontal infarct with mild associated   petechial hemorrhage.. VENTRICLES / SHIFT:  No hydrocephalus. No midline   shift.  EXTRA-AXIAL / BASAL CISTERNS:  Partly calcified small extra-axial mass   left parietal region near the vertex appears stable. Basal cisterns   preserved.  CALVARIUM AND EXTRACRANIAL SOFT TISSUES:  No depressed calvarial fracture.  SINUSES, ORBITS, MASTOIDS:  The visualized paranasal sinuses and mastoid   air cells are well aerated.    IMPRESSION:  Degraded by motion.  Stable small to moderate subacute posterior left frontal infarct with   mild associated petechial hemorrhage.    --- End of Report ---            MEY TAVAREZ MD; Attending Radiologist  This document has been electronically signed. May 29 2024  2:08PM    < end of copied text >      PROTEIN CALORIE MALNUTRITION PRESENT: [ ]mild [ ]moderate [ ]severe [ ]underweight [ ]morbid obesity  https://www.andeal.org/vault/2440/web/files/ONC/Table_Clinical%20Characteristics%20to%20Document%20Malnutrition-White%20JV%20et%20al%2020.pdf    Height (cm): 180.3 (24 @ 15:38), 180.3 (24 @ 23:25), 180.3 (23 @ :15)  Weight (kg): 99.8 (24 @ 15:38), 101 (24 @ 23:25), 102.1 (23 @ :15)  BMI (kg/m2): 30.7 (24 @ 15:38), 31.1 (24 @ 23:25), 31.4 (23 @ :15)    [x ]PPSV2 < or = to 30% [ ]significant weight loss  [ x]poor nutritional intake  [ ]anasarca[ ]Artificial Nutrition      Other REFERRALS:  [ ]Hospice  [ ]Child Life  [x ]Social Work  [ ]Case management [ ]Holistic Therapy

## 2024-05-30 NOTE — PROGRESS NOTE ADULT - CONVERSATION/DISCUSSION
Diagnosis/Treatment Options
Prognosis/MOLST Discussed/Treatment Options/Hospice Referral
Prognosis/Treatment Options

## 2024-05-30 NOTE — PROGRESS NOTE ADULT - PROBLEM SELECTOR PLAN 7
- Chronic, hold home Cipro, now started on zosyn for concern of  aspiration PNA - Chronic, hold home Cipro, now started on zosyn for aspiration PNA

## 2024-05-30 NOTE — PROGRESS NOTE ADULT - PROBLEM SELECTOR PLAN 8
- Resume tube feed  - Nutrition consult    #Advanced Care Planning  - Pt DNR/DNI at Rehab, MOLST filled with wife Sania at bedside, placed in chart

## 2024-05-30 NOTE — PROGRESS NOTE ADULT - SUBJECTIVE AND OBJECTIVE BOX
Date/Time Patient Seen:  		  Referring MD:   Data Reviewed	       Patient is a 84y old  Male who presents with a chief complaint of AMS, CVA (29 May 2024 19:58)      Subjective/HPI     PAST MEDICAL & SURGICAL HISTORY:  Diabetes Mellitus, Type II    CAD (Coronary Artery Disease)  s/p 3v CABG 2004; stents placed in [place] in 2019    Dyslipidemia    Asymptomatic Peripheral Vascular Disease    Osteomyelitis    COPD (chronic obstructive pulmonary disease)  on 2L at home and BiPAP at night; intubated 6/18    Diabetes mellitus    Hypertension    PVD (peripheral vascular disease)    History of PAT (paroxysmal atrial tachycardia)    Asthma with COPD    BPH (benign prostatic hyperplasia)    Acute osteomyelitis    CABG (Coronary Artery Bypass Graft)  2004    Compound fracture  left leg    S/P primary angioplasty with coronary stent    H/O drainage of abscess  Left femur 12/2021          Medication list         MEDICATIONS  (STANDING):  acetaminophen     Tablet .. 650 milliGRAM(s) Oral every 6 hours  acetaZOLAMIDE    Tablet 250 milliGRAM(s) Oral once  aMIOdarone    Tablet 200 milliGRAM(s) Oral daily  apixaban 2.5 milliGRAM(s) Oral every 12 hours  atorvastatin 80 milliGRAM(s) Oral at bedtime  budesonide 160 MICROgram(s)/formoterol 4.5 MICROgram(s) Inhaler 2 Puff(s) Inhalation two times a day  dextrose 10% Bolus 125 milliLiter(s) IV Bolus once  dextrose 5%. 1000 milliLiter(s) (50 mL/Hr) IV Continuous <Continuous>  dextrose 5%. 1000 milliLiter(s) (100 mL/Hr) IV Continuous <Continuous>  dextrose 5%. 1000 milliLiter(s) (75 mL/Hr) IV Continuous <Continuous>  dextrose 50% Injectable 25 Gram(s) IV Push once  dextrose 50% Injectable 12.5 Gram(s) IV Push once  glucagon  Injectable 1 milliGRAM(s) IntraMuscular once  insulin glargine Injectable (LANTUS) 36 Unit(s) SubCutaneous at bedtime  insulin lispro (ADMELOG) corrective regimen sliding scale   SubCutaneous <User Schedule>  magnesium oxide 400 milliGRAM(s) Oral daily  metolazone 2.5 milliGRAM(s) Oral daily  metoprolol tartrate 25 milliGRAM(s) Oral two times a day  montelukast 10 milliGRAM(s) Oral at bedtime  piperacillin/tazobactam IVPB.. 3.375 Gram(s) IV Intermittent every 8 hours  tiotropium 2.5 MICROgram(s) Inhaler 2 Puff(s) Inhalation daily  zinc sulfate 220 milliGRAM(s) Oral daily    MEDICATIONS  (PRN):  albuterol    0.083% 2.5 milliGRAM(s) Nebulizer every 2 hours PRN Shortness of Breath and/or Wheezing  dextrose Oral Gel 15 Gram(s) Oral once PRN Blood Glucose LESS THAN 70 milliGRAM(s)/deciliter         Vitals log        ICU Vital Signs Last 24 Hrs  T(C): 37.1 (30 May 2024 05:04), Max: 37.3 (29 May 2024 05:34)  T(F): 98.7 (30 May 2024 05:04), Max: 99.2 (29 May 2024 05:34)  HR: 100 (30 May 2024 05:04) (81 - 115)  BP: 112/70 (30 May 2024 05:04) (110/67 - 148/79)  BP(mean): --  ABP: --  ABP(mean): --  RR: 18 (30 May 2024 05:04) (18 - 20)  SpO2: 98% (30 May 2024 05:04) (91% - 99%)    O2 Parameters below as of 30 May 2024 05:04  Patient On (Oxygen Delivery Method): BiPAP/CPAP                 Input and Output:  I&O's Detail    28 May 2024 07:01  -  29 May 2024 07:00  --------------------------------------------------------  IN:    Enteral Tube Flush: 360 mL    Free Water: 400 mL    Glucerna 1.5: 585 mL    Heparin Infusion: 60 mL    IV PiggyBack: 100 mL  Total IN: 1505 mL    OUT:  Total OUT: 0 mL    Total NET: 1505 mL          Lab Data                        9.0    17.88 )-----------( 196      ( 29 May 2024 11:00 )             32.5     05-29    149<H>  |  110<H>  |  48<H>  ----------------------------<  356<H>  4.5   |  43<H>  |  1.70<H>    Ca    8.8      29 May 2024 11:00  Phos  2.8     05-29  Mg     2.7     05-29    TPro  5.9<L>  /  Alb  2.0<L>  /  TBili  0.4  /  DBili  x   /  AST  72<H>  /  ALT  90<H>  /  AlkPhos  167<H>  05-29    ABG - ( 29 May 2024 16:26 )  pH, Arterial: 7.37  pH, Blood: x     /  pCO2: 73    /  pO2: 81    / HCO3: 42    / Base Excess: 16.9  /  SaO2: 98.3                    Review of Systems	      Objective     Physical Examination    heart s1s2  lung dc BS  head nc      Pertinent Lab findings & Imaging      Eliecer:  NO   Adequate UO     I&O's Detail    28 May 2024 07:01  -  29 May 2024 07:00  --------------------------------------------------------  IN:    Enteral Tube Flush: 360 mL    Free Water: 400 mL    Glucerna 1.5: 585 mL    Heparin Infusion: 60 mL    IV PiggyBack: 100 mL  Total IN: 1505 mL    OUT:  Total OUT: 0 mL    Total NET: 1505 mL               Discussed with:     Cultures:	        Radiology

## 2024-05-30 NOTE — PROGRESS NOTE ADULT - SUBJECTIVE AND OBJECTIVE BOX
Neurology Follow up note    YUE COTTO84yMale    HPI:  83yo M PMHx AF (on Eliquis), CAD (s/p multiple stents), HTN, DM, HLD, COPD, OM (L Femur, on Cipro), recently admitted to [facility] for RVR, hospital course complicated by new L M3 CVA, PEG tube placed 5/15, subsequently discharged to [facility]. Pt now presenting from Rehab with decline in mental status per wife Sania, at bedside. History obtained entirely from wife, as pt unable to speak presently. States she initially noticed him being "nonresponsive," noncommunicative on yesterday in the rehab, with no change into today. States that before this he did have slurred speech with waxing and waning dysarthria, however was baseline alert and oriented x3. Was also baseline with minimal to no motor function of RLE, RUE, with diminished motor function of LUE, LLE, was requiring assistance in all activities. Of note, wife states she noticed patient was not on Ciprofloxacin (chronic med for OM) while in rehab, and inquired as to why. No answer as to why, however patient was restarted on Cipro in Rehab.    ED Course:  Vitals: T 100.8 F, , BP 95/59, RR 26, SpO2 93% on 2L  Given: Ofirmev x1, Rocephin x1, 1L NS Bolus x2  Pertinent Labs: WBC 10.5, Na 150, HCO3 42, BUN/Cr 50/1.8, Glu 414. Mg 2.8. Lactate 2.2 --> 1.5. HsTi 185.6. Pro-.  AB.44 / 65 / 89 / 44    Imaging:  CT C/A/P No con: Layering secretions in the lower trachea and bilateral proximal mainstem  bronchi with scattered opacification of distal branching lobar and segmental airways bilaterally and with peribronchial wall thickening in lower lobes suggesting bronchitis. No lung consolidation. No source of infection identified in the abdomen or pelvis. PEG tube in place. Scattered colonic diverticula.  CT Head No Con: 1.  Gyriform hyperdensity in the region of a left frontal parietal subacute infarct suggesting cortical laminar necrosis or hemorrhage. 2.  Small subacute high right frontal infarct. Subacute bilateral cerebellar infarcts.    EKG: Sinus Tachycardia w/PVC on personal read; Rate 105, QTc 452 (26 May 2024 21:02)      Interval History -slightly more responsive    Patient is seen, chart was reviewed and case was discussed with the treatment team.  Pt is not in any distress.   Lying on bed comfortably.       Vital Signs Last 24 Hrs  T(C): 36.9 (30 May 2024 11:16), Max: 37.1 (30 May 2024 05:04)  T(F): 98.5 (30 May 2024 11:16), Max: 98.7 (30 May 2024 05:04)  HR: 90 (30 May 2024 16:05) (82 - 809)  BP: 115/72 (30 May 2024 11:16) (110/67 - 115/72)  BP(mean): --  RR: 18 (30 May 2024 11:16) (18 - 20)  SpO2: 99% (30 May 2024 16:05) (96% - 99%)    Parameters below as of 30 May 2024 11:16  Patient On (Oxygen Delivery Method): BiPAP/CPAP                  REVIEW OF SYSTEMS:  limited due to aphasia  not in any distress      Mental Status - Pt is unresponsive to verbal command; slight eyes opening with tactile stimulation  not following any commands     Speech - Pt is non verbal    Cranial Nerves - Pupils 3 mm equal and reactive to light, extraocular eye movements intact. Pt has no visual field deficit.  Pt has  right  facial asymmetry. Facial sensation is intact.Tongue - is in midline.    Muscle tone - is reduced on right  .      Motor Exam - RUE 0/5; RLE 1/5  Left side 2-3/5    Sensory Exam -. Pt withdraws all extremities equally on stimulation        coordination:    Finger to nose: normal    Heel to shin: normal    Deep tendon Reflexes - 2 plus all over          Neck Supple -  Yes.     MEDICATIONS  (STANDING):  acetaminophen     Tablet .. 650 milliGRAM(s) Oral every 6 hours  aMIOdarone    Tablet 200 milliGRAM(s) Oral daily  apixaban 2.5 milliGRAM(s) Oral every 12 hours  atorvastatin 80 milliGRAM(s) Oral at bedtime  budesonide 160 MICROgram(s)/formoterol 4.5 MICROgram(s) Inhaler 2 Puff(s) Inhalation two times a day  dextrose 10% Bolus 125 milliLiter(s) IV Bolus once  dextrose 5%. 1000 milliLiter(s) (100 mL/Hr) IV Continuous <Continuous>  dextrose 5%. 1000 milliLiter(s) (50 mL/Hr) IV Continuous <Continuous>  dextrose 5%. 1000 milliLiter(s) (65 mL/Hr) IV Continuous <Continuous>  dextrose 50% Injectable 12.5 Gram(s) IV Push once  dextrose 50% Injectable 25 Gram(s) IV Push once  glucagon  Injectable 1 milliGRAM(s) IntraMuscular once  insulin glargine Injectable (LANTUS) 36 Unit(s) SubCutaneous at bedtime  insulin lispro (ADMELOG) corrective regimen sliding scale   SubCutaneous every 6 hours  magnesium oxide 400 milliGRAM(s) Oral daily  metolazone 2.5 milliGRAM(s) Oral daily  metoprolol tartrate 25 milliGRAM(s) Oral two times a day  piperacillin/tazobactam IVPB.. 3.375 Gram(s) IV Intermittent every 8 hours  tiotropium 2.5 MICROgram(s) Inhaler 2 Puff(s) Inhalation daily  zinc sulfate 220 milliGRAM(s) Oral daily    MEDICATIONS  (PRN):  albuterol    0.083% 2.5 milliGRAM(s) Nebulizer every 2 hours PRN Shortness of Breath and/or Wheezing  dextrose Oral Gel 15 Gram(s) Oral once PRN Blood Glucose LESS THAN 70 milliGRAM(s)/deciliter  r      Allergies    No Known Allergies    Intolerances        148<H>  |  108  |  54<H>  ----------------------------<  356<H>  4.2   |  42<H>  |  2.00<H>    Ca    9.1      30 May 2024 10:45  Phos  3.4     05-  Mg     3.1     05-30    TPro  5.7<L>  /  Alb  1.9<L>  /  TBili  0.4  /  DBili  x   /  AST  48<H>  /  ALT  65  /  AlkPhos  108  05-30                          8.2    15.45 )-----------( 180      ( 30 May 2024 10:45 )             29.4     Hemoglobin A1C:     Vitamin B12     RADIOLOGY    ASSESSMENT AND PLAN:      seen for ams  related to-  hypercarbic resp failure  new cva      prognosis por for meaningful neuro recovery  palliative care consults appreciated  ON BIPAP  on apixaban and statin  Pain is accessed and addressed.  Plan of care was discussed with family. Questions answered.  Would continue to follow.

## 2024-05-30 NOTE — SOCIAL WORK PROGRESS NOTE - NSSWPROGRESSNOTE_GEN_ALL_CORE
Per MD, pt spouse Sania is considering hospice. Pt's sons will be coming to the hospital tomorrow with spouse Sania to assist in decision making. Palliative is also consulted for today. SW will continue to follow.

## 2024-05-31 NOTE — PROGRESS NOTE ADULT - PROBLEM SELECTOR PLAN 2
- New bilateral CVA's, hx Left M3 infarct  - Pt was out of window for thrombolytic therapy  - s/p Heparin gtt    - CT Head No Con: 1.  Gyriform hyperdensity in the region of a left frontal parietal subacute infarct suggesting cortical laminar necrosis or hemorrhage. 2.  Small subacute high right frontal infarct. Subacute bilateral cerebellar infarcts.  -MR head: New posterior left corona radiata/frontal lobe infarct.  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 Petechial hemorrhage in the left parietal lobe. Bilateral areas of recent  microhemorrhages.  -Repeat CT head 3/29: Degraded by motion. Stable small to moderate subacute posterior left frontal infarct with  mild associated petechial hemorrhage.  -Eliquis 2.5mg BID resumed on 5/29 per neurology recs as CT head is stable  - s/p Acetazolamide ordered for metabolic alkalosis  - Neuro check q4h  - C/w high-dose statin  - Dr. Underwood Neurology consulted

## 2024-05-31 NOTE — PROGRESS NOTE ADULT - PROBLEM SELECTOR PLAN 4
will follow  discussed with Dr. Will Banerjee MD, [facility]; Palliative Care Attending, Ethicist. [phone]

## 2024-05-31 NOTE — SOCIAL WORK PROGRESS NOTE - NSSWPROGRESSNOTE_GEN_ALL_CORE
Per Palliative MD, family to decide today if they wish to have hospice services in place. SW will continue to follow to assist as needed.

## 2024-05-31 NOTE — PLAN
[] : Right [FreeTextEntry1] : Patient  to have revascularization on Wednesday . We will address the ulcer next week and plan for possible distal symes and bone biopsy Patient was told if there are signs of infection ( fever, chills, nausea, vomiting ) or changes in the condition of the wound ( pain , cellulitis , purulent drainage or odor ) they should proceed to the ER as soon as possible Spent 20 minutes for patient care and medical decision making. continue wound care

## 2024-05-31 NOTE — PROGRESS NOTE ADULT - PROBLEM SELECTOR PLAN 4
- Hx T2DM with hyperglycemia > 400   -Increased Lantus 40u subq at bedtime standing  -Sliding scale to reflect times feeds  - Hypoglycemia protocol  - Tube feeds held in the setting of aspiration PNA and BiPAP

## 2024-05-31 NOTE — CASE MANAGEMENT PROGRESS NOTE - NSCMPROGRESSNOTE_GEN_ALL_CORE
CM consult noted for managing medical conditions at home, SW following, pt from nursing home, plan for hospice.

## 2024-05-31 NOTE — PROGRESS NOTE ADULT - SUBJECTIVE AND OBJECTIVE BOX
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 Pt now presenting from Rehab with decline in mental status per wife Sania, at bedside. History obtained entirely from wife, as pt unable to speak presently. States she initially noticed him being "nonresponsive," noncommunicative on yesterday in the rehab, with no change into today. States that before this he did have slurred speech with waxing and waning dysarthria, however was baseline alert and oriented x3. Was also baseline with minimal to no motor function of RLE, RUE, with diminished motor function of LUE, LLE, was requiring assistance in all activities. Of note, wife states she noticed patient was not on Ciprofloxacin (chronic med for OM) while in rehab, and inquired as to why. No answer as to why, however patient was restarted on Cipro in Rehab.    ED Course:  Vitals: T 100.8 F, , BP 95/59, RR 26, SpO2 93% on 2L  Given: Ofirmev x1, Rocephin x1, 1L NS Bolus x2  Pertinent Labs: WBC 10.5, Na 150, HCO3 42, BUN/Cr 50/1.8, Glu 414. Mg 2.8. Lactate 2.2 --> 1.5. HsTi 185.6. Pro-.  AB.44 / 65 / 89 / 44    Imaging:  CT C/A/P No con: Layering secretions in the lower trachea and bilateral proximal mainstem  bronchi with scattered opacification of distal branching lobar and segmental airways bilaterally and with peribronchial wall thickening in lower lobes suggesting bronchitis. No lung consolidation. No source of infection identified in the abdomen or pelvis. PEG tube in place. Scattered colonic diverticula.  CT Head No Con: 1.  Gyriform hyperdensity in the region of a left frontal parietal subacute infarct suggesting cortical laminar necrosis or hemorrhage. 2.  Small subacute high right frontal infarct. Subacute bilateral cerebellar infarcts.    EKG: Sinus Tachycardia w/PVC on personal read; Rate 105, QTc 452 (26 May 2024 21:02)      Interval History -sleepy     Patient is seen, chart was reviewed and case was discussed with the treatment team.  Pt is not in any distress.   Lying on bed comfortably.       Vital Signs Last 24 Hrs  T(C): 36.6 (31 May 2024 16:18), Max: 36.7 (31 May 2024 11:58)  T(F): 97.8 (31 May 2024 16:18), Max: 98 (31 May 2024 11:58)  HR: 109 (31 May 2024 16:18) (75 - 109)  BP: 101/64 (31 May 2024 16:18) (100/50 - 119/75)  BP(mean): --  RR: 19 (31 May 2024 16:18) (18 - 19)  SpO2: 92% (31 May 2024 16:18) (92% - 100%)    Parameters below as of 31 May 2024 04:50  Patient On (Oxygen Delivery Method): BiPAP/CPAP                      REVIEW OF SYSTEMS:  limited due to aphasia  not in any distress      Mental Status - Pt is unresponsive to verbal command;   not following any commands     Speech - Pt is non verbal    Cranial Nerves - Pupils 3 mm equal and reactive to light, extraocular eye movements intact. Pt has no visual field deficit.  Pt has  right  facial asymmetry. Facial sensation is intact.Tongue - is in midline.    Muscle tone - is reduced on right  .      Motor Exam - RUE 0/5; RLE 1/5  Left side 2-3/5    Sensory Exam -. Pt withdraws all extremities equally on stimulation        coordination:    Finger to nose: normal    Heel to shin: normal    Deep tendon Reflexes - 2 plus all over          Neck Supple -  Yes.     MEDICATIONS  (STANDING):  budesonide 160 MICROgram(s)/formoterol 4.5 MICROgram(s) Inhaler 2 Puff(s) Inhalation two times a day  HYDROmorphone  Injectable 0.5 milliGRAM(s) IV Push every 4 hours  LORazepam   Injectable 0.5 milliGRAM(s) IV Push every 6 hours  tiotropium 2.5 MICROgram(s) Inhaler 2 Puff(s) Inhalation daily    MEDICATIONS  (PRN):  albuterol    0.083% 2.5 milliGRAM(s) Nebulizer every 2 hours PRN Shortness of Breath and/or Wheezing  bisacodyl Suppository 10 milliGRAM(s) Rectal daily PRN Constipation  glycopyrrolate Injectable 0.2 milliGRAM(s) IV Push every 6 hours PRN secretions  HYDROmorphone  Injectable 0.2 milliGRAM(s) IV Push every 2 hours PRN Moderate Pain (4 - 6)  HYDROmorphone  Injectable 0.5 milliGRAM(s) IV Push every 2 hours PRN Severe Pain (7 - 10)  HYDROmorphone  Injectable 0.5 milliGRAM(s) IV Push every 2 hours PRN Dyspnea  LORazepam   Injectable 0.5 milliGRAM(s) IV Push every 2 hours PRN Anxiety, agitation, refractory dyspnea    r      Allergies    No Known Allergies    Intolerances    Vital Signs Last 24 Hrs  T(C): 36.6 (31 May 2024 16:18), Max: 36.7 (31 May 2024 11:58)  T(F): 97.8 (31 May 2024 16:18), Max: 98 (31 May 2024 11:58)  HR: 109 (31 May 2024 16:18) (75 - 109)  BP: 101/64 (31 May 2024 16:18) (100/50 - 119/75)  BP(mean): --  RR: 19 (31 May 2024 16:18) (18 - 19)  SpO2: 92% (31 May 2024 16:18) (92% - 100%)    Parameters below as of 31 May 2024 04:50  Patient On (Oxygen Delivery Method): BiPAP/CPAP    10:45 )             29.4     Hemoglobin A1C:     Vitamin B12     RADIOLOGY    ASSESSMENT AND PLAN:      seen for ams  related to-  hypercarbic resp failure  new cva    on comfort care  prognosis por for meaningful neuro recovery

## 2024-05-31 NOTE — PROGRESS NOTE ADULT - PROBLEM SELECTOR PLAN 3
Cr baseline appears to be 1.3 per chart review   -Likely pre-renal 2/2 dec PO intake  -Pt remains on d5 IVF @ 65cc/hr  -on tube feeds with free water

## 2024-05-31 NOTE — PROGRESS NOTE ADULT - PROBLEM SELECTOR PLAN 1
- Severe sepsis present on admission with T 100.8, , RR 26, Lactate 2.2 on admission  -CT AP: Layering secretions in the lower trachea and bilateral proximal mainstem bronchi with scattered opacification of distal branching lobar and segmental airways bilaterally and with peribronchial wall thickening in lower lobes suggesting bronchitis. No lung consolidation. No source of infection identified in the abdomen or pelvis.  - UCx, BCx both NGTD x72hrs  -Pts WBC remains elevated but has been afebrile throughout admission.  -Pt source of infection is likely aspiration PNA as pt with layering secretions in the trachei/BL mainsteam bronchi with other CT findings as above, and his declining mental status.  - ID Dr. العلي consulted, Continue zosyn 3.375gm q8 day 4 (can complete at least 5 days if family would like to continue abx)

## 2024-05-31 NOTE — PROGRESS NOTE ADULT - PROBLEM SELECTOR PLAN 9
Chronic  -On BiPAP as pt retaining pCO2  -PCO2 improved  -Montelukast DC'd  -S/p duonebs, dex  - Symbicort and Spiriva ordered  -Metolazone ordered via peg to control excess secretions

## 2024-05-31 NOTE — HISTORY OF PRESENT ILLNESS
[FreeTextEntry1] : accompanied by wife Sania [phone] alert and oriented Cr: 1.48 4/4/24 last eliquis yesterday morning last ozempic last week took metoprolol this morning/gave aspirin at 815am  BS reported 143 [FreeTextEntry5] : yesterday at 7pm  [FreeTextEntry6] : Dr. Pendleton

## 2024-05-31 NOTE — PROGRESS NOTE ADULT - SUBJECTIVE AND OBJECTIVE BOX
[facility]  INFECTIOUS DISEASES   [address] [place] [address]  Tel: [phone]     Fax: [phone]  ========================================================  MD Noman Perry Kaushal, MD Cho, Michelle, MD Sunjit, Jaspal, MD  ========================================================    [facility]-[id number]  YUE Elora     Follow up: Stroke and fever    Patient is not responding, still on BiPAP. Wife at the bedside.  No fever.    PAST MEDICAL & SURGICAL HISTORY:  Diabetes Mellitus, Type II  CAD (Coronary Artery Disease)  s/p 3v CABG ; stents placed in [place] in   Dyslipidemia  Osteomyelitis  COPD (chronic obstructive pulmonary disease)  on 2L at home and BiPAP at night; intubated   Hypertension  PVD (peripheral vascular disease)  History of PAT (paroxysmal atrial tachycardia)  Asthma with COPD  BPH (benign prostatic hyperplasia)  Acute osteomyelitis  CABG (Coronary Artery Bypass Graft)    Compound fracture  left leg  S/P primary angioplasty with coronary stent  H/O drainage of abscess  Left femur 2021    Social Hx: No current smoking, EtOH or drugs     FAMILY HISTORY:  Family history of diabetes mellitus (Sibling)    Family hx of lung cancer  brother,  age 82, used to smoke with pt    Allergies  No Known Allergies    MEDICATIONS  (STANDING):  acetaminophen     Tablet .. 650 milliGRAM(s) Oral every 6 hours  albuterol    90 MICROgram(s) HFA Inhaler 2 Puff(s) Inhalation every 6 hours  aMIOdarone    Tablet 200 milliGRAM(s) Oral daily  atorvastatin 80 milliGRAM(s) Oral at bedtime  budesonide 160 MICROgram(s)/formoterol 4.5 MICROgram(s) Inhaler 2 Puff(s) Inhalation two times a day  dextrose 10% Bolus 125 milliLiter(s) IV Bolus once  dextrose 5%. 1000 milliLiter(s) (100 mL/Hr) IV Continuous <Continuous>  dextrose 5%. 1000 milliLiter(s) (50 mL/Hr) IV Continuous <Continuous>  dextrose 50% Injectable 12.5 Gram(s) IV Push once  dextrose 50% Injectable 25 Gram(s) IV Push once  glucagon  Injectable 1 milliGRAM(s) IntraMuscular once  heparin   Injectable 8000 Unit(s) IV Push once  heparin  Infusion.  Unit(s)/Hr (18 mL/Hr) IV Continuous <Continuous>  insulin glargine Injectable (LANTUS) 30 Unit(s) SubCutaneous at bedtime  insulin lispro (ADMELOG) corrective regimen sliding scale   SubCutaneous every 6 hours  insulin lispro Injectable (ADMELOG) 3 Unit(s) SubCutaneous every 6 hours  magnesium oxide 400 milliGRAM(s) Oral daily  metoprolol tartrate 25 milliGRAM(s) Oral two times a day  montelukast 10 milliGRAM(s) Oral at bedtime  piperacillin/tazobactam IVPB. 3.375 Gram(s) IV Intermittent once  piperacillin/tazobactam IVPB.- 3.375 Gram(s) IV Intermittent once  piperacillin/tazobactam IVPB.. 3.375 Gram(s) IV Intermittent every 8 hours  tiotropium 2.5 MICROgram(s) Inhaler 2 Puff(s) Inhalation daily  zinc sulfate 220 milliGRAM(s) Oral daily    MEDICATIONS  (PRN):  dextrose Oral Gel 15 Gram(s) Oral once PRN Blood Glucose LESS THAN 70 milliGRAM(s)/deciliter  heparin   Injectable 4000 Unit(s) IV Push every 6 hours PRN For aPTT between 40 - 57  heparin   Injectable 8000 Unit(s) IV Push every 6 hours PRN For aPTT less than 40     REVIEW OF SYSTEMS:  Unable     Physical Exam:  Vital Signs Last 24 Hrs  T(C): 36.6 (31 May 2024 04:50), Max: 36.6 (31 May 2024 04:50)  T(F): 97.8 (31 May 2024 04:50), Max: 97.8 (31 May 2024 04:50)  HR: 96 (31 May 2024 08:10) (75 - 96)  BP: 102/59 (31 May 2024 04:50) (100/50 - 102/59)  BP(mean): --  RR: 18 (31 May 2024 04:50) (18 - 18)  SpO2: 98% (31 May 2024 08:10) (98% - 100%)  Parameters below as of 31 May 2024 04:50  Patient On (Oxygen Delivery Method): BiPAP/CPAP  HEENT: normocephalic and atraumatic.   NECK: Supple.  No lymphadenopathy   LUNGS: scattered rhonchi bilaterally   HEART: Irregular rate and rhythm  ABDOMEN: Soft, and nondistended. PEG in place looks fine  EXTREMITIES: Without edema. Left femur with scar and   fluctuation, no discharge from area   NEUROLOGIC: unable   PSYCHIATRIC: more awake and trying to communicate   SKIN: No rash     Labs:                        8.7    13.31 )-----------( 156      ( 31 May 2024 09:40 )             30.0         148<H>  |  108  |  54<H>  ----------------------------<  356<H>  4.2   |  42<H>  |  2.00<H>    Ca    9.1      30 May 2024 10:45  Phos  3.4       Mg     3.1         TPro  5.7<L>  /  Alb  1.9<L>  /  TBili  0.4  /  DBili  x   /  AST  48<H>  /  ALT  65  /  AlkPhos  108      Culture - Urine (collected 24 @ 17:49)  Source: Catheterized Catheterized  Final Report (24 @ 16:16):    No growth    Culture - Blood (collected 24 @ 17:00)  Source: .Blood Blood-Peripheral  Preliminary Report (24 @ 22:01):    No growth at 4 days    Culture - Blood (collected 24 @ 16:45)  Source: .Blood Blood-Peripheral  Preliminary Report (24 @ 22:01):    No growth at 4 days    Culture - Urine (collected 24 @ 01:15)  Source: Clean Catch Clean Catch (Midstream)  Final Report (24 @ 12:08):    No growth    Culture - Blood (collected 24 @ 23:50)  Source: .Blood Blood-Peripheral  Final Report (24 @ 05:01):    No growth at 5 days    Culture - Blood (collected 24 @ 23:45)  Source: .Blood Blood-Peripheral  Final Report (24 @ 05:01):    No growth at 5 days    Culture - Other (collected 24 @ 11:19)  Source: Wound Wound  Final Report (03-15-24 @ 14:55):    Moderate Staphylococcus pettenkoferi "Susceptibilities not performed"    Culture - Abscess with Gram Stain (collected 03-10-23 @ 11:50)  Source: .Abscess left leg  Final Report (03-15-23 @ 19:04):    Few Corynebacterium jeikeium    "Susceptibilities not performed"    Culture - Abscess with Gram Stain (collected 23 @ 13:50)  Source: .Abscess left thigh  Final Report (23 @ 14:49):    Moderate Coag Negative Staphylococcus "Susceptibilities not performed"    Multiple Morphological Strains    Culture - Urine (collected 23 @ 20:15)  Source: Clean Catch Clean Catch (Midstream)  Final Report (23 @ 23:02):    <10,000 CFU/mL Normal Urogenital Maria Esther    Culture - Blood (collected 23 @ 15:53)  Source: .Blood Blood-Peripheral  Final Report (23 @ 23:01):    No Growth Final    Culture - Blood (collected 23 @ 15:43)  Source: .Blood Blood-Peripheral  Final Report (23 @ 23:01):    No Growth Final    Culture - Blood (collected 22 @ 08:40)  Source: .Blood Blood-Peripheral  Final Report (22 @ 12:00):    No Growth Final    Culture - Blood (collected 22 @ 08:35)  Source: .Blood Blood-Peripheral  Final Report (22 @ 12:00):    No Growth Final    Culture - Blood (collected 22 @ 15:23)  Source: .Blood Blood-Peripheral  Final Report (08-10-22 @ 01:01):    No Growth Final    Culture - Blood (collected 22 @ 14:30)  Source: .Blood Blood-Peripheral  Final Report (08-10-22 @ 01:01):    No Growth Final    WBC Count: 13.31 K/uL (24 @ 09:40)  WBC Count: 15.45 K/uL (24 @ 10:45)  WBC Count: 17.88 K/uL (24 @ 11:00)  WBC Count: 15.71 K/uL (24 @ 06:16)  WBC Count: 15.99 K/uL (24 @ 07:27)  WBC Count: 16.67 K/uL (24 @ 03:37)  WBC Count: 10.53 K/uL (24 @ 17:10)    Creatinine: 2.00 mg/dL (24 @ 10:45)  Creatinine: 1.70 mg/dL (24 @ 11:00)  Creatinine: 1.80 mg/dL (24 @ 06:16)  Creatinine: 1.60 mg/dL (24 @ 07:27)  Creatinine: 1.80 mg/dL (24 @ 17:10)     SARS-CoV-2 Result: NotDetec (24 @ 17:10)    All imaging and other data have been reviewed.  < from: CT Abdomen and Pelvis No Cont (24 @ 18:37) >  IMPRESSION:  Layering secretions in the lower trachea and bilateral proximal mainstem   bronchi with scattered opacification of distal branching lobar and   segmental airways bilaterally and with peribronchial wall thickening in   lower lobes suggesting bronchitis. No lung consolidation.  No source of infection identified in the abdomen or pelvis.    CT head :  IMPRESSION:  1.  Gyriform hyperdensity in the region of a left frontal parietal   subacute infarct suggesting cortical laminar necrosis or hemorrhage.  2.  Small subacute high right frontal infarct. Subacute bilateral   cerebellar infarcts.    Assessment and Plan:   85yo man with PMH of HTN, CAD, DM2, Afib, COPD, OM (L Femur, on suppressive Cipro), recently admitted to [facility] for RVR, hospital course complicated by new L M3 CVA, PEG tube placed 5/15, subsequently discharged to [facility].   Pt now presenting from Rehab with decline in mental status currently not responding.   WBC 15-16k   In ED T 100.8 F  UA negative   Flu/RSV/COVID negative   CT chest/abd/p: bronchitis? otherwise negative   CT head: subacute infarcts     Most likely AMS 2' to a new stroke, but due to possible aspiration and recent hospitalization, on chronic cipro for left leg OM.     # AMS  # Acute and subacute stroke  # R/O aspiration / Bronchitis   # PEG in place  # Left femur OM    - Blood cultures NGTD   - MRI of head with a new stroke   - On zosyn 3.375gm, today is the day , can stop after or as per GOC   - Monitor WBC, today 17-->13  - Most likely family will go with comfort measures only and hospice care today   - No need to resume cirpo  Prognosis poor    Will sign off please call with any question.     Brandi العلي MD  Division of Infectious Diseases   Please call ID service at [phone] with any question.    50 minutes spent on total encounter assessing patient, examination, chart review, counseling and coordinating care by the attending physician/nurse/care manager.

## 2024-05-31 NOTE — PROGRESS NOTE ADULT - SUBJECTIVE AND OBJECTIVE BOX
CAPILLARY BLOOD GLUCOSE      POCT Blood Glucose.: 210 mg/dL (31 May 2024 07:56)  POCT Blood Glucose.: 237 mg/dL (31 May 2024 05:45)  POCT Blood Glucose.: 238 mg/dL (31 May 2024 05:44)  POCT Blood Glucose.: 391 mg/dL (31 May 2024 00:01)  POCT Blood Glucose.: 304 mg/dL (31 May 2024 00:00)  POCT Blood Glucose.: 345 mg/dL (30 May 2024 19:00)  POCT Blood Glucose.: 316 mg/dL (30 May 2024 16:57)  POCT Blood Glucose.: 299 mg/dL (30 May 2024 11:08)      Vital Signs Last 24 Hrs  T(C): 36.6 (31 May 2024 04:50), Max: 36.9 (30 May 2024 11:16)  T(F): 97.8 (31 May 2024 04:50), Max: 98.5 (30 May 2024 11:16)  HR: 92 (31 May 2024 05:00) (75 - 92)  BP: 102/59 (31 May 2024 04:50) (100/50 - 115/72)  BP(mean): --  RR: 18 (31 May 2024 04:50) (18 - 18)  SpO2: 100% (31 May 2024 05:00) (98% - 100%)    Parameters below as of 31 May 2024 04:50  Patient On (Oxygen Delivery Method): BiPAP/CPAP        General: WN/WD NAD  Respiratory: CTA B/L  CV: RRR, S1S2, no murmurs, rubs or gallops  Abdominal: Soft, NT, ND +BS, Last BM  Extremities: No edema, + peripheral pulses     05-30    148<H>  |  108  |  54<H>  ----------------------------<  356<H>  4.2   |  42<H>  |  2.00<H>    Ca    9.1      30 May 2024 10:45  Phos  3.4     05-30  Mg     3.1     05-30    TPro  5.7<L>  /  Alb  1.9<L>  /  TBili  0.4  /  DBili  x   /  AST  48<H>  /  ALT  65  /  AlkPhos  108  05-30      atorvastatin 80 milliGRAM(s) Oral at bedtime  dextrose 50% Injectable 12.5 Gram(s) IV Push once  dextrose 50% Injectable 25 Gram(s) IV Push once  dextrose Oral Gel 15 Gram(s) Oral once PRN  glucagon  Injectable 1 milliGRAM(s) IntraMuscular once  insulin glargine Injectable (LANTUS) 36 Unit(s) SubCutaneous at bedtime  insulin lispro (ADMELOG) corrective regimen sliding scale   SubCutaneous every 6 hours

## 2024-05-31 NOTE — PROCEDURE
[Groin check for bleeding/hematoma, vitals checked, and Doppler/pulse checked on admission, then every 15 minutes for an hour and every 30 minutes for 3 hours thereafter.] : Groin check for bleeding/hematoma, vitals checked, and Doppler/pulse checked on admission, then every 15 minutes for an hour and every 30 minutes for 3 hours thereafter.  [Keep flat for 2 hours, then elevate head gradually as tolerated] : Keep flat for 2 hours, then elevate head gradually as tolerated [Resume Diet] : resume diet [Discharge at _____] : Discharge at [unfilled]. [D/C IV on discharge] : D/C IV on discharge [Resume diet] : resume diet [FreeTextEntry1] : Aortogram, right leg angiogram

## 2024-05-31 NOTE — PROGRESS NOTE ADULT - PROBLEM SELECTOR PLAN 1
increase lantus 40 units qhs  cont mod dose admelog corrective scale coverage q6hrs  goal bg conservative mngmt

## 2024-05-31 NOTE — PROGRESS NOTE ADULT - SUBJECTIVE AND OBJECTIVE BOX
Indication for Geriatrics and Palliative Care Services/INTERVAL HPI:  SUBJECTIVE AND OBJECTIVE:    OVERNIGHT EVENTS:    DNR on chart:DNI  DNI      Allergies    No Known Allergies    Intolerances    MEDICATIONS  (STANDING):  acetaminophen     Tablet .. 650 milliGRAM(s) Oral every 6 hours  aMIOdarone    Tablet 200 milliGRAM(s) Oral daily  apixaban 2.5 milliGRAM(s) Oral every 12 hours  atorvastatin 80 milliGRAM(s) Oral at bedtime  budesonide 160 MICROgram(s)/formoterol 4.5 MICROgram(s) Inhaler 2 Puff(s) Inhalation two times a day  dextrose 10% Bolus 125 milliLiter(s) IV Bolus once  dextrose 5%. 1000 milliLiter(s) (100 mL/Hr) IV Continuous <Continuous>  dextrose 5%. 1000 milliLiter(s) (50 mL/Hr) IV Continuous <Continuous>  dextrose 5%. 1000 milliLiter(s) (65 mL/Hr) IV Continuous <Continuous>  dextrose 50% Injectable 12.5 Gram(s) IV Push once  dextrose 50% Injectable 25 Gram(s) IV Push once  glucagon  Injectable 1 milliGRAM(s) IntraMuscular once  insulin glargine Injectable (LANTUS) 40 Unit(s) SubCutaneous at bedtime  insulin lispro (ADMELOG) corrective regimen sliding scale   SubCutaneous every 6 hours  magnesium oxide 400 milliGRAM(s) Oral daily  metolazone 2.5 milliGRAM(s) Oral daily  metoprolol tartrate 25 milliGRAM(s) Oral two times a day  piperacillin/tazobactam IVPB.. 3.375 Gram(s) IV Intermittent every 8 hours  tiotropium 2.5 MICROgram(s) Inhaler 2 Puff(s) Inhalation daily  zinc sulfate 220 milliGRAM(s) Oral daily    MEDICATIONS  (PRN):  albuterol    0.083% 2.5 milliGRAM(s) Nebulizer every 2 hours PRN Shortness of Breath and/or Wheezing  dextrose Oral Gel 15 Gram(s) Oral once PRN Blood Glucose LESS THAN 70 milliGRAM(s)/deciliter      ITEMS UNCHECKED ARE NOT PRESENT    PRESENT SYMPTOMS: [ ]Unable to self-report - see [ ] CPOT [ ] PAINADS [ ] RDOS  Source if other than patient:  [ ]Family   [ ]Team     Pain:  [ ]yes [ ]no  QOL impact -   Location -                    Aggravating factors -  Quality -  Radiation -  Timing-  Severity (0-10 scale):  Minimal acceptable level (0-10 scale):     CPOT:    https://www.[facility].org/getattachment/[email or web address]/Critical-Care-Pain-Observation-Tool-(CPOT)    Dyspnea:                           [ ]Mild [ ]Moderate [ ]Severe  Anxiety:                             [ ]Mild [ ]Moderate [ ]Severe  Fatigue:                             [ ]Mild [ ]Moderate [ ]Severe  Nausea:                             [ ]Mild [ ]Moderate [ ]Severe  Loss of appetite:              [ ]Mild [ ]Moderate [ ]Severe  Constipation:                    [ ]Mild [ ]Moderate [ ]Severe    PCSSQ[Palliative Care Spiritual Screening Question]   Severity (0-10):  Score of 4 or > indicate consideration of Chaplaincy referral.  Chaplaincy Referral: [ ] yes [ ] refused [ ] following [ ] Deferred     Caregiver [place]? : [ ] yes [ ] no [ ] Deferred [ ] Declined             Social work referral [ ] Patient & Family Centered Care Referral [ ]     Anticipatory Grief present?:  [ ] yes [ ] no  [ ] Deferred                  Social work referral [ ] Chaplaincy Referral[ ]      Other Symptoms:  [ ]All other review of systems negative   [ ]Unable to obtain due to poor mentation    Palliative Performance Status Version 2:         %      http://npcrc.org/files/news/palliative_performance_scale_ppsv2.pdf  PHYSICAL EXAM:  Vital Signs Last 24 Hrs  T(C): 36.6 (31 May 2024 16:18), Max: 36.7 (31 May 2024 11:58)  T(F): 97.8 (31 May 2024 16:18), Max: 98 (31 May 2024 11:58)  HR: 109 (31 May 2024 16:18) (75 - 109)  BP: 101/64 (31 May 2024 16:18) (100/50 - 119/75)  BP(mean): --  RR: 19 (31 May 2024 16:18) (18 - 19)  SpO2: 92% (31 May 2024 16:18) (92% - 100%)    Parameters below as of 31 May 2024 04:50  Patient On (Oxygen Delivery Method): BiPAP/CPAP     I&O's Summary    30 May 2024 07:01  -  31 May 2024 07:00  --------------------------------------------------------  IN: 0 mL / OUT: 450 mL / NET: -450 mL       GENERAL: [ ]Cachexia    [ ]Alert  [ ]Oriented x   [ ]Lethargic  [ ]Unarousable  [ ]Verbal  [ ]Non-Verbal  Behavioral:   [ ]Anxiety  [ ]Delirium [ ]Agitation [ ]Other  HEENT:  [ ]Normal   [ ]Dry mouth   [ ]ET Tube/Trach  [ ]Oral lesions  PULMONARY:   [ ]Clear [ ]Tachypnea  [ ]Audible excessive secretions   [ ]Rhonchi        [ ]Right [ ]Left [ ]Bilateral  [ ]Crackles        [ ]Right [ ]Left [ ]Bilateral  [ ]Wheezing     [ ]Right [ ]Left [ ]Bilateral  [ ]Diminished BS [ ] Right [ ]Left [ ]Bilateral  CARDIOVASCULAR:    [ ]Regular [ ]Irregular [ ]Tachy  [ ]Dexter [ ]Murmur [ ]Other  GASTROINTESTINAL:  [ ]Soft  [ ]Distended   [ ]+BS  [ ]Non tender [ ]Tender  [ ]Other [ ]PEG [ ]OGT/ NGT   Last BM:   GENITOURINARY:  [ ]Normal [ ]Incontinent   [ ]Oliguria/Anuria   [ ]Gonzáles  MUSCULOSKELETAL:   [ ]Normal   [ ]Weakness  [ ]Bed/Wheelchair bound [ ]Edema  NEUROLOGIC:   [ ]No focal deficits  [ ] Cognitive impairment  [ ] Dysphagia [ ]Dysarthria [ ] Paresis [ ]Other   SKIN:   [ ]Normal  [ ]Rash  [ ]Other  [ ]Pressure ulcer(s) [ ]y [ ]n present on admission    CRITICAL CARE:  [ ]Shock Present  [ ]Septic [ ]Cardiogenic [ ]Neurologic [ ]Hypovolemic  [ ]Vasopressors [ ]Inotropes  [ ]Respiratory failure present [ ]Mechanical Ventilation [ ]Non-invasive ventilatory support [ ]High-Flow   [ ]Acute  [ ]Chronic [ ]Hypoxic  [ ]Hypercarbic [ ]Other  [ ]Other organ failure     LABS:                        8.7    13.31 )-----------( 156      ( 31 May 2024 09:40 )             30.0   05-30    148<H>  |  108  |  54<H>  ----------------------------<  356<H>  4.2   |  42<H>  |  2.00<H>    Ca    9.1      30 May 2024 10:45  Phos  3.4     05-30  Mg     3.1     05-30    TPro  5.7<L>  /  Alb  1.9<L>  /  TBili  0.4  /  DBili  x   /  AST  48<H>  /  ALT  65  /  AlkPhos  108  05-30      Urinalysis Basic - ( 30 May 2024 10:45 )    Color: x / Appearance: x / SG: x / pH: x  Gluc: 356 mg/dL / Ketone: x  / Bili: x / Urobili: x   Blood: x / Protein: x / Nitrite: x   Leuk Esterase: x / RBC: x / WBC x   Sq Epi: x / Non Sq Epi: x / Bacteria: x      RADIOLOGY & ADDITIONAL STUDIES:    Protein Calorie Malnutrition Present: [ ]mild [ ]moderate [ ]severe [ ]underweight [ ]morbid obesity  https://www.andeal.org/vault/2440/web/files/ONC/Table_Clinical%20Characteristics%20to%20Document%20Malnutrition-White%20JV%20et%20al%202012.pdf    Height (cm): 180.3 (05-26-24 @ 15:38), 180.3 (04-27-24 @ 23:25), 180.3 (11-14-23 @ 14:15)  Weight (kg): 99.8 (05-26-24 @ 15:38), 101 (04-27-24 @ 23:25), 102.1 (11-14-23 @ 14:15)  BMI (kg/m2): 30.7 (05-26-24 @ 15:38), 31.1 (04-27-24 @ 23:25), 31.4 (11-14-23 @ 14:15)    [ ]PPSV2 < or = 30%  [ ]significant weight loss [ ]poor nutritional intake [ ]anasarca[ ]Artificial Nutrition    Other REFERRALS:  [ ]Hospice  [ ]Child Life  [ ]Social Work  [ ]Case management [ ]Holistic Therapy        Indication for Geriatrics and Palliative Care Services/INTERVAL HPI: goals of care  SUBJECTIVE AND OBJECTIVE: Patient seen and examined; no family at bedside; per discussion with palliative RN earlier, wife wants to wait to transition to CMO for when patients sons are present    OVERNIGHT EVENTS: no acute overnight events    DNR on chart:DNI  DNI      Allergies    No Known Allergies    Intolerances    MEDICATIONS  (STANDING):  acetaminophen     Tablet .. 650 milliGRAM(s) Oral every 6 hours  aMIOdarone    Tablet 200 milliGRAM(s) Oral daily  apixaban 2.5 milliGRAM(s) Oral every 12 hours  atorvastatin 80 milliGRAM(s) Oral at bedtime  budesonide 160 MICROgram(s)/formoterol 4.5 MICROgram(s) Inhaler 2 Puff(s) Inhalation two times a day  dextrose 10% Bolus 125 milliLiter(s) IV Bolus once  dextrose 5%. 1000 milliLiter(s) (100 mL/Hr) IV Continuous <Continuous>  dextrose 5%. 1000 milliLiter(s) (50 mL/Hr) IV Continuous <Continuous>  dextrose 5%. 1000 milliLiter(s) (65 mL/Hr) IV Continuous <Continuous>  dextrose 50% Injectable 12.5 Gram(s) IV Push once  dextrose 50% Injectable 25 Gram(s) IV Push once  glucagon  Injectable 1 milliGRAM(s) IntraMuscular once  insulin glargine Injectable (LANTUS) 40 Unit(s) SubCutaneous at bedtime  insulin lispro (ADMELOG) corrective regimen sliding scale   SubCutaneous every 6 hours  magnesium oxide 400 milliGRAM(s) Oral daily  metolazone 2.5 milliGRAM(s) Oral daily  metoprolol tartrate 25 milliGRAM(s) Oral two times a day  piperacillin/tazobactam IVPB.. 3.375 Gram(s) IV Intermittent every 8 hours  tiotropium 2.5 MICROgram(s) Inhaler 2 Puff(s) Inhalation daily  zinc sulfate 220 milliGRAM(s) Oral daily    MEDICATIONS  (PRN):  albuterol    0.083% 2.5 milliGRAM(s) Nebulizer every 2 hours PRN Shortness of Breath and/or Wheezing  dextrose Oral Gel 15 Gram(s) Oral once PRN Blood Glucose LESS THAN 70 milliGRAM(s)/deciliter      ITEMS UNCHECKED ARE NOT PRESENT    PRESENT SYMPTOMS: [x ]Unable to self-report - see [ ] CPOT [x ] PAINADS [ x] RDOS  Source if other than patient:  [ ]Family   [ ]Team     Pain:  [ ]yes [ ]no  QOL impact -   Location -                    Aggravating factors -  Quality -  Radiation -  Timing-  Severity (0-10 scale):  Minimal acceptable level (0-10 scale):     CPOT:    https://www.[facility].org/getattachment/[email or web address]/Critical-Care-Pain-Observation-Tool-(CPOT)    Dyspnea:                           [ ]Mild [ ]Moderate [ ]Severe  Anxiety:                             [ ]Mild [ ]Moderate [ ]Severe  Fatigue:                             [ ]Mild [ ]Moderate [ ]Severe  Nausea:                             [ ]Mild [ ]Moderate [ ]Severe  Loss of appetite:              [ ]Mild [ ]Moderate [ ]Severe  Constipation:                    [ ]Mild [ ]Moderate [ ]Severe    PCSSQ[Palliative Care Spiritual Screening Question]   Severity (0-10):  Score of 4 or > indicate consideration of Chaplaincy referral.  Chaplaincy Referral: [ ] yes [ ] refused [ ] following [ x] Deferred     Caregiver [place]? : [ ] yes [ x] no [ ] Deferred [ ] Declined             Social work referral [ ] Patient & Family Centered Care Referral [ ]     Anticipatory Grief present?:  [x ] yes [ ] no  [ ] Deferred                  Social work referral [ ] Chaplaincy Referral[ ]      Other Symptoms:  [ ]All other review of systems negative   [x ]Unable to obtain due to poor mentation    Palliative Performance Status Version 2:       10  %      http://npcrc.org/files/news/palliative_performance_scale_ppsv2.pdf  PHYSICAL EXAM:  Vital Signs Last 24 Hrs  T(C): 36.6 (31 May 2024 16:18), Max: 36.7 (31 May 2024 11:58)  T(F): 97.8 (31 May 2024 16:18), Max: 98 (31 May 2024 11:58)  HR: 109 (31 May 2024 16:18) (75 - 109)  BP: 101/64 (31 May 2024 16:18) (100/50 - 119/75)  BP(mean): --  RR: 19 (31 May 2024 16:18) (18 - 19)  SpO2: 92% (31 May 2024 16:18) (92% - 100%)    Parameters below as of 31 May 2024 04:50  Patient On (Oxygen Delivery Method): BiPAP/CPAP     I&O's Summary    30 May 2024 07:01  -  31 May 2024 07:00  --------------------------------------------------------  IN: 0 mL / OUT: 450 mL / NET: -450 mL       GENERAL: [ ]Cachexia    [ ]Alert  [ ]Oriented x   [x ]Lethargic  [ ]Unarousable  [ ]Verbal  [ ]Non-Verbal  Behavioral:   [ ]Anxiety  [ ]Delirium [ ]Agitation [ ]Other  HEENT:  [ ]Normal   [ x]Dry mouth   [ ]ET Tube/Trach  [ ]Oral lesions  PULMONARY: NIPPV in place  [ ]Clear [ ]Tachypnea  [ ]Audible excessive secretions   [ ]Rhonchi        [ ]Right [ ]Left [ ]Bilateral  [ ]Crackles        [ ]Right [ ]Left [ ]Bilateral  [ ]Wheezing     [ ]Right [ ]Left [ ]Bilateral  [ x]Diminished BS [ ] Right [ ]Left [ ]Bilateral  CARDIOVASCULAR:    [x ]Regular [ ]Irregular [ ]Tachy  [ ]Dexter [ ]Murmur [ ]Other  GASTROINTESTINAL:  [ x]Soft  [ ]Distended   [ ]+BS  [ ]Non tender [ ]Tender  [ ]Other [x ]PEG [ ]OGT/ NGT   Last BM:   GENITOURINARY:  [ ]Normal [ x]Incontinent   [ ]Oliguria/Anuria   [ ]Gonzáles  MUSCULOSKELETAL:   [ ]Normal   [ x]Weakness  [x ]Bed/Wheelchair bound [ ]Edema  NEUROLOGIC:   [ ]No focal deficits  [x ] Cognitive impairment  [ x] Dysphagia [ ]Dysarthria [ ] Paresis [ ]Other   SKIN:   [ ]Normal  [ ]Rash  [x ]Other  [ ]Pressure ulcer(s) [ ]y [ ]n present on admission    CRITICAL CARE:  [ ]Shock Present  [ ]Septic [ ]Cardiogenic [ ]Neurologic [ ]Hypovolemic  [ ]Vasopressors [ ]Inotropes  [ ]Respiratory failure present [ ]Mechanical Ventilation [ ]Non-invasive ventilatory support [ ]High-Flow   [ ]Acute  [ ]Chronic [ ]Hypoxic  [ ]Hypercarbic [ ]Other  [ ]Other organ failure     LABS:                        8.7    13.31 )-----------( 156      ( 31 May 2024 09:40 )             30.0   05-30    148<H>  |  108  |  54<H>  ----------------------------<  356<H>  4.2   |  42<H>  |  2.00<H>    Ca    9.1      30 May 2024 10:45  Phos  3.4     05-30  Mg     3.1     05-30    TPro  5.7<L>  /  Alb  1.9<L>  /  TBili  0.4  /  DBili  x   /  AST  48<H>  /  ALT  65  /  AlkPhos  108  05-30      Urinalysis Basic - ( 30 May 2024 10:45 )    Color: x / Appearance: x / SG: x / pH: x  Gluc: 356 mg/dL / Ketone: x  / Bili: x / Urobili: x   Blood: x / Protein: x / Nitrite: x   Leuk Esterase: x / RBC: x / WBC x   Sq Epi: x / Non Sq Epi: x / Bacteria: x      RADIOLOGY & ADDITIONAL STUDIES: no new imaging    Protein Calorie Malnutrition Present: [ ]mild [ ]moderate [ ]severe [ ]underweight [ ]morbid obesity  https://www.andeal.org/vault/2440/web/files/ONC/Table_Clinical%20Characteristics%20to%20Document%20Malnutrition-White%20JV%20et%20al%202012.pdf    Height (cm): 180.3 (05-26-24 @ 15:38), 180.3 (04-27-24 @ 23:25), 180.3 (11-14-23 @ 14:15)  Weight (kg): 99.8 (05-26-24 @ 15:38), 101 (04-27-24 @ 23:25), 102.1 (11-14-23 @ 14:15)  BMI (kg/m2): 30.7 (05-26-24 @ 15:38), 31.1 (04-27-24 @ 23:25), 31.4 (11-14-23 @ 14:15)    [x ]PPSV2 < or = 30%  [ ]significant weight loss [x ]poor nutritional intake [ ]anasarca[ ]Artificial Nutrition    Other REFERRALS:  [ ]Hospice  [ ]Child Life  [x ]Social Work  [ ]Case management [ ]Holistic Therapy

## 2024-05-31 NOTE — PROGRESS NOTE ADULT - SUBJECTIVE AND OBJECTIVE BOX
Patient is a 84y old  Male who presents with a chief complaint of AMS, CVA (31 May 2024 08:26)      INTERVAL HPI/OVERNIGHT EVENTS: Pt seen and examined at bedside. Pt remains on BiPAP; repeat ABG yesterday displayed pt still retaining pCO2 despite BiPAP. Pt remains unresponsive to verbal stimuli and sternal rub. Patient's family to discuss GOC in full today.    MEDICATIONS  (STANDING):  acetaminophen     Tablet .. 650 milliGRAM(s) Oral every 6 hours  aMIOdarone    Tablet 200 milliGRAM(s) Oral daily  apixaban 2.5 milliGRAM(s) Oral every 12 hours  atorvastatin 80 milliGRAM(s) Oral at bedtime  budesonide 160 MICROgram(s)/formoterol 4.5 MICROgram(s) Inhaler 2 Puff(s) Inhalation two times a day  dextrose 10% Bolus 125 milliLiter(s) IV Bolus once  dextrose 5%. 1000 milliLiter(s) (100 mL/Hr) IV Continuous <Continuous>  dextrose 5%. 1000 milliLiter(s) (50 mL/Hr) IV Continuous <Continuous>  dextrose 5%. 1000 milliLiter(s) (65 mL/Hr) IV Continuous <Continuous>  dextrose 50% Injectable 12.5 Gram(s) IV Push once  dextrose 50% Injectable 25 Gram(s) IV Push once  glucagon  Injectable 1 milliGRAM(s) IntraMuscular once  insulin glargine Injectable (LANTUS) 40 Unit(s) SubCutaneous at bedtime  insulin lispro (ADMELOG) corrective regimen sliding scale   SubCutaneous every 6 hours  magnesium oxide 400 milliGRAM(s) Oral daily  metolazone 2.5 milliGRAM(s) Oral daily  metoprolol tartrate 25 milliGRAM(s) Oral two times a day  piperacillin/tazobactam IVPB.. 3.375 Gram(s) IV Intermittent every 8 hours  tiotropium 2.5 MICROgram(s) Inhaler 2 Puff(s) Inhalation daily  zinc sulfate 220 milliGRAM(s) Oral daily    MEDICATIONS  (PRN):  albuterol    0.083% 2.5 milliGRAM(s) Nebulizer every 2 hours PRN Shortness of Breath and/or Wheezing  dextrose Oral Gel 15 Gram(s) Oral once PRN Blood Glucose LESS THAN 70 milliGRAM(s)/deciliter      Allergies    No Known Allergies    Intolerances        REVIEW OF SYSTEMS:  unable to assess 2/2 ams    Vital Signs Last 24 Hrs  T(C): 36.6 (31 May 2024 04:50), Max: 36.9 (30 May 2024 11:16)  T(F): 97.8 (31 May 2024 04:50), Max: 98.5 (30 May 2024 11:16)  HR: 92 (31 May 2024 05:00) (75 - 92)  BP: 102/59 (31 May 2024 04:50) (100/50 - 115/72)  BP(mean): --  RR: 18 (31 May 2024 04:50) (18 - 18)  SpO2: 100% (31 May 2024 05:00) (98% - 100%)    Parameters below as of 31 May 2024 04:50  Patient On (Oxygen Delivery Method): BiPAP/CPAP        PHYSICAL EXAM:  GENERAL: ill-appearing, elderly male, does not respond to painful or verbal stimuli  HEENT:  NC/AT, unable to assess EOM, anicteric, dry mucous membranes, +BiPAP in place  CHEST/LUNG:  Coarse breath sounds bilaterally.   HEART:  RRR, S1, S2 , no tachy   ABDOMEN:  BS+, soft, nontender, nondistended. PEG tube c/d/i  MUSCULOSKELETAL: no edema, cyanosis, or calf tenderness  NEUROLOGIC: AAOX0. Does not respond to commands for strength / neuro assessment.  : External catheter        LABS:                        8.2    15.45 )-----------( 180      ( 30 May 2024 10:45 )             29.4     CBC Full  -  ( 30 May 2024 10:45 )  WBC Count : 15.45 K/uL  Hemoglobin : 8.2 g/dL  Hematocrit : 29.4 %  Platelet Count - Automated : 180 K/uL  Mean Cell Volume : 97.4 fl  Mean Cell Hemoglobin : 27.2 pg  Mean Cell Hemoglobin Concentration : 27.9 gm/dL  Auto Neutrophil # : 13.70 K/uL  Auto Lymphocyte # : 0.43 K/uL  Auto Monocyte # : 1.05 K/uL  Auto Eosinophil # : 0.00 K/uL  Auto Basophil # : 0.02 K/uL  Auto Neutrophil % : 88.7 %  Auto Lymphocyte % : 2.8 %  Auto Monocyte % : 6.8 %  Auto Eosinophil % : 0.0 %  Auto Basophil % : 0.1 %    30 May 2024 10:45    148    |  108    |  54     ----------------------------<  356    4.2     |  42     |  2.00     Ca    9.1        30 May 2024 10:45  Phos  3.4       30 May 2024 10:45  Mg     3.1       30 May 2024 10:45    TPro  5.7    /  Alb  1.9    /  TBili  0.4    /  DBili  x      /  AST  48     /  ALT  65     /  AlkPhos  108    30 May 2024 10:45      Urinalysis Basic - ( 30 May 2024 10:45 )    Color: x / Appearance: x / SG: x / pH: x  Gluc: 356 mg/dL / Ketone: x  / Bili: x / Urobili: x   Blood: x / Protein: x / Nitrite: x   Leuk Esterase: x / RBC: x / WBC x   Sq Epi: x / Non Sq Epi: x / Bacteria: x      CAPILLARY BLOOD GLUCOSE      POCT Blood Glucose.: 210 mg/dL (31 May 2024 07:56)  POCT Blood Glucose.: 237 mg/dL (31 May 2024 05:45)  POCT Blood Glucose.: 238 mg/dL (31 May 2024 05:44)  POCT Blood Glucose.: 391 mg/dL (31 May 2024 00:01)  POCT Blood Glucose.: 304 mg/dL (31 May 2024 00:00)  POCT Blood Glucose.: 345 mg/dL (30 May 2024 19:00)  POCT Blood Glucose.: 316 mg/dL (30 May 2024 16:57)  POCT Blood Glucose.: 299 mg/dL (30 May 2024 11:08)        Culture - Urine (collected 05-26-24 @ 17:49)  Source: Catheterized Catheterized  Final Report (05-27-24 @ 16:16):    No growth    Culture - Blood (collected 05-26-24 @ 17:00)  Source: .Blood Blood-Peripheral  Preliminary Report (05-30-24 @ 22:01):    No growth at 4 days    Culture - Blood (collected 05-26-24 @ 16:45)  Source: .Blood Blood-Peripheral  Preliminary Report (05-30-24 @ 22:01):    No growth at 4 days        RADIOLOGY & ADDITIONAL TESTS:    Personally reviewed.     Consultant(s) Notes Reviewed:  [x] YES  [ ] NO

## 2024-05-31 NOTE — PROGRESS NOTE ADULT - SUBJECTIVE AND OBJECTIVE BOX
Date/Time Patient Seen:  		  Referring MD:   Data Reviewed	       Patient is a 84y old  Male who presents with a chief complaint of AMS, CVA (30 May 2024 19:31)      Subjective/HPI     PAST MEDICAL & SURGICAL HISTORY:  Diabetes Mellitus, Type II    CAD (Coronary Artery Disease)  s/p 3v CABG 2004; stents placed in [place] in 2019    Dyslipidemia    Asymptomatic Peripheral Vascular Disease    Osteomyelitis    COPD (chronic obstructive pulmonary disease)  on 2L at home and BiPAP at night; intubated 6/18    Diabetes mellitus    Hypertension    PVD (peripheral vascular disease)    History of PAT (paroxysmal atrial tachycardia)    Asthma with COPD    BPH (benign prostatic hyperplasia)    Acute osteomyelitis    CABG (Coronary Artery Bypass Graft)  2004    Compound fracture  left leg    S/P primary angioplasty with coronary stent    H/O drainage of abscess  Left femur 12/2021          Medication list         MEDICATIONS  (STANDING):  acetaminophen     Tablet .. 650 milliGRAM(s) Oral every 6 hours  aMIOdarone    Tablet 200 milliGRAM(s) Oral daily  apixaban 2.5 milliGRAM(s) Oral every 12 hours  atorvastatin 80 milliGRAM(s) Oral at bedtime  budesonide 160 MICROgram(s)/formoterol 4.5 MICROgram(s) Inhaler 2 Puff(s) Inhalation two times a day  dextrose 10% Bolus 125 milliLiter(s) IV Bolus once  dextrose 5%. 1000 milliLiter(s) (100 mL/Hr) IV Continuous <Continuous>  dextrose 5%. 1000 milliLiter(s) (50 mL/Hr) IV Continuous <Continuous>  dextrose 5%. 1000 milliLiter(s) (65 mL/Hr) IV Continuous <Continuous>  dextrose 50% Injectable 12.5 Gram(s) IV Push once  dextrose 50% Injectable 25 Gram(s) IV Push once  glucagon  Injectable 1 milliGRAM(s) IntraMuscular once  insulin glargine Injectable (LANTUS) 36 Unit(s) SubCutaneous at bedtime  insulin lispro (ADMELOG) corrective regimen sliding scale   SubCutaneous every 6 hours  magnesium oxide 400 milliGRAM(s) Oral daily  metolazone 2.5 milliGRAM(s) Oral daily  metoprolol tartrate 25 milliGRAM(s) Oral two times a day  piperacillin/tazobactam IVPB.. 3.375 Gram(s) IV Intermittent every 8 hours  tiotropium 2.5 MICROgram(s) Inhaler 2 Puff(s) Inhalation daily  zinc sulfate 220 milliGRAM(s) Oral daily    MEDICATIONS  (PRN):  albuterol    0.083% 2.5 milliGRAM(s) Nebulizer every 2 hours PRN Shortness of Breath and/or Wheezing  dextrose Oral Gel 15 Gram(s) Oral once PRN Blood Glucose LESS THAN 70 milliGRAM(s)/deciliter         Vitals log        ICU Vital Signs Last 24 Hrs  T(C): 36.6 (31 May 2024 04:50), Max: 36.9 (30 May 2024 11:16)  T(F): 97.8 (31 May 2024 04:50), Max: 98.5 (30 May 2024 11:16)  HR: 92 (31 May 2024 05:00) (75 - 92)  BP: 102/59 (31 May 2024 04:50) (100/50 - 115/72)  BP(mean): --  ABP: --  ABP(mean): --  RR: 18 (31 May 2024 04:50) (18 - 18)  SpO2: 100% (31 May 2024 05:00) (98% - 100%)    O2 Parameters below as of 31 May 2024 04:50  Patient On (Oxygen Delivery Method): BiPAP/CPAP                 Input and Output:  I&O's Detail    29 May 2024 07:01  -  30 May 2024 07:00  --------------------------------------------------------  IN:    dextrose 5%: 900 mL    Free Water: 120 mL    IV PiggyBack: 200 mL  Total IN: 1220 mL    OUT:    Voided (mL): 300 mL  Total OUT: 300 mL    Total NET: 920 mL      30 May 2024 07:01  -  31 May 2024 05:31  --------------------------------------------------------  IN:  Total IN: 0 mL    OUT:    Voided (mL): 450 mL  Total OUT: 450 mL    Total NET: -450 mL          Lab Data                        8.2    15.45 )-----------( 180      ( 30 May 2024 10:45 )             29.4     05-30    148<H>  |  108  |  54<H>  ----------------------------<  356<H>  4.2   |  42<H>  |  2.00<H>    Ca    9.1      30 May 2024 10:45  Phos  3.4     05-30  Mg     3.1     05-30    TPro  5.7<L>  /  Alb  1.9<L>  /  TBili  0.4  /  DBili  x   /  AST  48<H>  /  ALT  65  /  AlkPhos  108  05-30    ABG - ( 30 May 2024 13:08 )  pH, Arterial: 7.32  pH, Blood: x     /  pCO2: 81    /  pO2: 123   / HCO3: 42    / Base Excess: 15.6  /  SaO2: 100.0                   Review of Systems	      Objective     Physical Examination  heart s1s2  lung dc BS  head nc        Pertinent Lab findings & Imaging      Eliecer:  NO   Adequate UO     I&O's Detail    29 May 2024 07:01  -  30 May 2024 07:00  --------------------------------------------------------  IN:    dextrose 5%: 900 mL    Free Water: 120 mL    IV PiggyBack: 200 mL  Total IN: 1220 mL    OUT:    Voided (mL): 300 mL  Total OUT: 300 mL    Total NET: 920 mL      30 May 2024 07:01  -  31 May 2024 05:31  --------------------------------------------------------  IN:  Total IN: 0 mL    OUT:    Voided (mL): 450 mL  Total OUT: 450 mL    Total NET: -450 mL               Discussed with:     Cultures:	        Radiology

## 2024-05-31 NOTE — PROGRESS NOTE ADULT - SUBJECTIVE AND OBJECTIVE BOX
Patient is a 84y old  Male who presents with a chief complaint of AMS, CVA (28 May 2024 10:41)    Patient seen in follow up for hypernatremia, MERRILL.        PAST MEDICAL HISTORY:  Diabetes Mellitus, Type II    CAD (Coronary Artery Disease)    Dyslipidemia    Asymptomatic Peripheral Vascular Disease    Osteomyelitis    COPD (chronic obstructive pulmonary disease)    Diabetes mellitus    Hypertension    PVD (peripheral vascular disease)    History of PAT (paroxysmal atrial tachycardia)    Asthma with COPD    BPH (benign prostatic hyperplasia)    Acute osteomyelitis      MEDICATIONS  (STANDING):  acetaminophen     Tablet .. 650 milliGRAM(s) Oral every 6 hours  aMIOdarone    Tablet 200 milliGRAM(s) Oral daily  apixaban 2.5 milliGRAM(s) Oral every 12 hours  atorvastatin 80 milliGRAM(s) Oral at bedtime  budesonide 160 MICROgram(s)/formoterol 4.5 MICROgram(s) Inhaler 2 Puff(s) Inhalation two times a day  dextrose 10% Bolus 125 milliLiter(s) IV Bolus once  dextrose 5%. 1000 milliLiter(s) (100 mL/Hr) IV Continuous <Continuous>  dextrose 5%. 1000 milliLiter(s) (50 mL/Hr) IV Continuous <Continuous>  dextrose 5%. 1000 milliLiter(s) (65 mL/Hr) IV Continuous <Continuous>  dextrose 50% Injectable 12.5 Gram(s) IV Push once  dextrose 50% Injectable 25 Gram(s) IV Push once  glucagon  Injectable 1 milliGRAM(s) IntraMuscular once  insulin glargine Injectable (LANTUS) 40 Unit(s) SubCutaneous at bedtime  insulin lispro (ADMELOG) corrective regimen sliding scale   SubCutaneous every 6 hours  magnesium oxide 400 milliGRAM(s) Oral daily  metolazone 2.5 milliGRAM(s) Oral daily  metoprolol tartrate 25 milliGRAM(s) Oral two times a day  piperacillin/tazobactam IVPB.. 3.375 Gram(s) IV Intermittent every 8 hours  tiotropium 2.5 MICROgram(s) Inhaler 2 Puff(s) Inhalation daily  zinc sulfate 220 milliGRAM(s) Oral daily    MEDICATIONS  (PRN):  albuterol    0.083% 2.5 milliGRAM(s) Nebulizer every 2 hours PRN Shortness of Breath and/or Wheezing  dextrose Oral Gel 15 Gram(s) Oral once PRN Blood Glucose LESS THAN 70 milliGRAM(s)/deciliter    T(C): 36.7 (05-31-24 @ 11:58), Max: 37.1 (05-30-24 @ 05:04)  HR: 104 (05-31-24 @ 11:58) (75 - 809)  BP: 119/75 (05-31-24 @ 11:58) (100/50 - 127/70)  RR: 18 (05-31-24 @ 11:58)  SpO2: 97% (05-31-24 @ 11:58)  Wt(kg): --  I&O's Detail    30 May 2024 07:01  -  31 May 2024 07:00  --------------------------------------------------------  IN:  Total IN: 0 mL    OUT:    Voided (mL): 450 mL  Total OUT: 450 mL    Total NET: -450 mL            PHYSICAL EXAM:  General: No distress  Respiratory: b/l air entry  Cardiovascular: S1 S2  Gastrointestinal: soft  Extremities:  + edema                            LABORATORY:                        8.7    13.31 )-----------( 156      ( 31 May 2024 09:40 )             30.0     05-30    148<H>  |  108  |  54<H>  ----------------------------<  356<H>  4.2   |  42<H>  |  2.00<H>    Ca    9.1      30 May 2024 10:45  Phos  3.4     05-30  Mg     3.1     05-30    TPro  5.7<L>  /  Alb  1.9<L>  /  TBili  0.4  /  DBili  x   /  AST  48<H>  /  ALT  65  /  AlkPhos  108  05-30    Sodium: 148 mmol/L (05-30 @ 10:45)    Potassium: 4.2 mmol/L (05-30 @ 10:45)    Hemoglobin: 8.7 g/dL (05-31 @ 09:40)  Hemoglobin: 8.2 g/dL (05-30 @ 10:45)  Hemoglobin: 9.0 g/dL (05-29 @ 11:00)    Creatinine, Serum 2.00 (05-30 @ 10:45)  Creatinine, Serum 1.70 (05-29 @ 11:00)        LIVER FUNCTIONS - ( 30 May 2024 10:45 )  Alb: 1.9 g/dL / Pro: 5.7 g/dL / ALK PHOS: 108 U/L / ALT: 65 U/L / AST: 48 U/L / GGT: x           Urinalysis Basic - ( 30 May 2024 10:45 )    Color: x / Appearance: x / SG: x / pH: x  Gluc: 356 mg/dL / Ketone: x  / Bili: x / Urobili: x   Blood: x / Protein: x / Nitrite: x   Leuk Esterase: x / RBC: x / WBC x   Sq Epi: x / Non Sq Epi: x / Bacteria: x      ABG - ( 30 May 2024 13:08 )  pH, Arterial: 7.32  pH, Blood: x     /  pCO2: 81    /  pO2: 123   / HCO3: 42    / Base Excess: 15.6  /  SaO2: 100.0

## 2024-05-31 NOTE — PROGRESS NOTE ADULT - PROBLEM SELECTOR PLAN 3
DNR/DNI in place  worsening mental status with respiratory status continues despite bipap  Palliative team visited  bedside x3 today- 1st attempt this am wife states waiting for sons to arrive before considering CMO. two subsequent attempts in afternoon, wife not present at bedside. primary team updated on the above.     In the event family considers/decides on a comfort based approach, would recommend the following:  Given organ dysfunction (renal +/- hepatic), would avoid morphine.  IV Dilaudid 0.5mg q4h ATC- order as STANDING  IV dilaudid 0.2mg q2h prn for moderate pain  IV dilaudid 0.5mg q2h prn for severe pain  IV dilaudid 0.5mg q2h prn for dyspnea- goal RR <24  IV ativan 0.5mg q6h ATC- order as STANDING  IV ativan 0.5mg q2h prn for anxiety, agitation, refractory dyspnea  IV glycopyrrolate 0.2mg q6h prn for secretions  dulcolax MN PRN constipation, daily

## 2024-06-01 NOTE — PROGRESS NOTE ADULT - PROBLEM SELECTOR PLAN 8
- Resume tube feed  - Nutrition consult    #Advanced Care Planning  - Pt DNR/DNI at Rehab, MOLST filled with wife Sania at bedside, placed in chart  - Comfort care #Advanced Care Planning  - Pt DNR/DNI at Rehab, MOLST filled with wife Sania at bedside, placed in chart  -    now comfort care

## 2024-06-01 NOTE — PROGRESS NOTE ADULT - ASSESSMENT
85yo M PMHx AF (on Eliquis), CAD (s/p multiple stents), HTN, DM, HLD, COPD, OM (L Femur, on Cipro), recently admitted to [facility] for RVR, hospital course complicated by new L M3 CVA, PEG tube placed 5/15, admitted from rehab with AMS. Palliative consulted for C
85yo M PMHx AF (on Eliquis), CAD (s/p multiple stents), HTN, DM, HLD, COPD, OM (L Femur, on Cipro), recently admitted to [facility] for RVR, hospital course complicated by new L M3 CVA, PEG tube placed 5/15, subsequently discharged to [facility]. Pt now presenting from Rehab with decline in mental status    cva  hemorrhagic cva eval  cad  htn  dm  COURTNEY  asthma  copd  OM  ataxic gait  anemia    on TF  cvs rx regimen  on inhaler rx regimen  got Nucala on recent admission at [facility] for Asthma management  follows with Dr Marcelino - [facility] -   monitor VS and Sat  assist with needs  on emp ABX  MRI and CT imaging reviewed  HOB elev - asp prec - oral hygiene  GOC - discussion
83yo M PMHx AF (on Eliquis), CAD (s/p multiple stents), HTN, DM, HLD, COPD, OM (L Femur, on Cipro), recently admitted to [facility] for RVR, hospital course complicated by new L M3 CVA, PEG tube placed 5/15, subsequently discharged to [facility]. Now presenting from rehab and admitted for new AMS, new B/L ischemic strokes. Now comfort care. 
83yo M PMHx AF (on Eliquis), CAD (s/p multiple stents), HTN, DM, HLD, COPD, OM (L Femur, on Cipro), recently admitted to [facility] for RVR, hospital course complicated by new L M3 CVA, PEG tube placed 5/15, subsequently discharged to [facility]. Pt now presenting from Rehab with decline in mental status    cva  hemorrhagic cva eval  cad  htn  dm  COURTNEY  asthma  copd  OM  ataxic gait  anemia    ICU eval noted  vs noted  on NIV  meds reviewed  GOC documented  DNR    on TF  cvs rx regimen  on inhaler rx regimen  got Nucala on recent admission at [facility] for Asthma management  follows with Dr Marcelino - [facility] -   monitor VS and Sat  assist with needs  on emp ABX  MRI and CT imaging reviewed  HOB elev - asp prec - oral hygiene  GOC - discussion  
85yo M PMHx AF (on Eliquis), CAD (s/p multiple stents), HTN, DM, HLD, COPD, OM (L Femur, on Cipro), recently admitted to [facility] for RVR, hospital course complicated by new L M3 CVA, PEG tube placed 5/15, subsequently discharged to [facility]. Pt now presenting from Rehab with decline in mental status    cva  hemorrhagic cva eval  cad  htn  dm  COURTNEY  asthma  copd  OM  ataxic gait  anemia    on zosyn  on niv - BIPAP  vs noted  meds reviewed  DNR DNI    on TF  cvs rx regimen  on inhaler rx regimen  got Nucala on recent admission at [place] for Asthma management  follows with Dr Marcelino - [facility] -   monitor VS and Sat  assist with needs  on emp ABX  MRI and CT imaging reviewed  HOB elev - asp prec - oral hygiene  GOC - discussion
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 Now presenting from rehab and admitted for new AMS, new B/L ischemic strokes.
85yo M PMHx AF (on Eliquis), CAD (s/p multiple stents), HTN, DM, HLD, COPD, OM (L Femur, on Cipro), [facility], hospital course complicated by new L M3 CVA, PEG tube placed 5/15, subsequently discharged to [facility]. Pt now presenting from Rehab with decline in mental status    cva  hemorrhagic cva eval  cad  htn  dm  COURTNEY  asthma  copd  OM  ataxic gait  anemia    transitioned to cmo  pall f/u noted  DNR DNI  GOC documented  vs noted  meds reviewed  opioids prn for comfort  benzos prn  oral hygiene  skin care  prognosis poor  
Hypernatremia: decreased free water intake  MERRILL on CKD 3: Prerenal azotemia  Hypotension  Diabetes  h/o Atrial fibrillation  h/o CVA    Events noted. For comfort measures. Overall prognosis poor. Family aware. Will sign off. Please recall if needed. Thank you. 
Hypernatremia: decreased free water intake  MERRILL on CKD 3: Prerenal azotemia  Hypotension  Diabetes  h/o Atrial fibrillation  h/o CVA    Worsening sodium levels despite increasing free water. Will add D5W. Monitor blood sugar levels. Insulin coverage as needed.   On tube feeds with free water. Monitor BP trend. Avoid nephrotoxic meds as possible. Overall prognosis poor. Discussed with patients wife at the bedside. 
Hypernatremia: decreased free water intake  MERRILL on CKD 3: Prerenal azotemia  Hypotension  Diabetes  h/o Atrial fibrillation  h/o CVA  Metabolic alkalosis    Improving sodium levels with D5W. Add Diamox for metabolic alkalosis. Monitor blood sugar levels. Insulin coverage as needed.   On tube feeds with free water. Monitor BP trend. Avoid nephrotoxic meds as possible. Overall prognosis poor. 
83yo M PMHx AF (on Eliquis), CAD (s/p multiple stents), HTN, DM, HLD, COPD, OM (L Femur, on Cipro), recently admitted to [facility] for RVR, hospital course complicated by new L M3 CVA, PEG tube placed 5/15, subsequently discharged to [facility]. Now presenting from rehab and admitted for new AMS, new B/L ischemic strokes.
83yo M PMHx AF (on Eliquis), CAD (s/p multiple stents), HTN, DM, HLD, COPD, OM (L Femur, on Cipro), recently admitted to [facility] for RVR, hospital course complicated by new L M3 CVA, PEG tube placed 5/15, subsequently discharged to [facility]. Now presenting from rehab and admitted for new AMS, new B/L ischemic strokes.
85yo M PMHx AF (on Eliquis), CAD (s/p multiple stents), HTN, DM, HLD, COPD, OM (L Femur, on Cipro), recently admitted to [facility] for [facility], hospital course complicated by new L M3 CVA, PEG tube placed 5/15, admitted from rehab with AMS. Palliative consulted for C
83yo M PMHx AF (on Eliquis), CAD (s/p multiple stents), HTN, DM, HLD, COPD, OM (L Femur, on Cipro), recently admitted to [facility] for RVR, hospital course complicated by new L M3 CVA, PEG tube placed 5/15, subsequently discharged to [facility]. Now presenting from rehab and admitted for new AMS, new B/L ischemic strokes.
83yo M PMHx AF (on Eliquis), CAD (s/p multiple stents), HTN, DM, HLD, COPD, OM (L Femur, on Cipro), recently admitted to [facility] for [facility], hospital course complicated by new L M3 CVA, PEG tube placed 5/15, admitted from rehab with AMS. Palliative consulted for C
83yo M PMHx AF (on Eliquis), CAD (s/p multiple stents), HTN, DM, HLD, COPD, OM (L Femur, on Cipro), recently admitted to [facility] for R[facility], hospital course complicated by new L M3 CVA, PEG tube placed 5/15, admitted from rehab with AMS. Palliative consulted for C

## 2024-06-01 NOTE — PROGRESS NOTE ADULT - PROVIDER SPECIALTY LIST ADULT
Infectious Disease
Infectious Disease
Hospitalist
Infectious Disease
Infectious Disease
Nephrology
Neurology
Palliative Care
Endocrinology
Hospitalist
Nephrology
Nephrology
Neurology
Pulmonology
Endocrinology
Pulmonology
Palliative Care
Hospitalist
Hospitalist
Palliative Care
Hospitalist
Palliative Care

## 2024-06-01 NOTE — PROGRESS NOTE ADULT - TIME BILLING
care coordination, chart review, discussion with family, note composition.
direct patient care including but not limited to reviewing chart, medications ,laboratory data, imaging reports, discussion of plan of care with consultants on the case, coordination of care with multidisciplinary team involved in the case and discussion of plan with patient.  Patient and family agreeable to plan of care and verbalized understanding the anticipated hospital course and treatment plan.
direct patient care including but not limited to reviewing chart, medications ,laboratory data, imaging reports, discussion of plan of care with consultants on the case, coordination of care with multidisciplinary team involved in the case and discussion of plan with family .   family agreeable to plan of care and verbalized understanding the anticipated hospital course and treatment plan.
pt seen and examine today see above plan - Severe sepsis poa   / sever hypernatremia   - Possible source from Lt Femur OM, as patient's cipro was held at Rehab since prior discharge for unknown reason  and -Other possible source is aspiration PNA  with acute hypoxic hypercarbic respiratory failure with  recurrent cva / with hx afib  back on  on full ac Eliquis  via peg    ct head  repeat  Stable small to moderate subacute posterior left frontal infarct with mild associated petechial hemorrhage / ok to start blood thinner as per neuro danyelle.   hold peg feed  s/p acute  hypercarbia   on bipap   prognosis very poor  wife bed side   discussion with    hospices as no clinical  recovery    both son  to arrive  bed side did not show up as per [facility] hospisce team / will discuss wife removal of bipap  and  pt on venti mask  for comfort  .
pt seen and examine today see above plan - Severe sepsis poa    with  sever hypernatremia   - Possible source from Lt Femur OM, as patient's cipro was held at Rehab since prior discharge for unknown reason  and -Other possible source is aspiration PNA  with acute hypoxic hypercarbic respiratory failure  s/p bipap  with  recurrent cva  on Eliquis / with hx afib     hold peg feed  s/p acute  hypercarbia   on bipap     prognosis very poor  wife bed side   discussion with    hospices as no clinical  recovery     agree for comfort care - started on iv   hydromorphone , glycopyrrolate iv  , ativan iv  no lab , no vitals , dc peg feed .
pt seen and examine today see above plan - Severe sepsis poa   / sever hypernatremia   - Possible source from Lt Femur OM, as patient's cipro was held at Rehab since prior discharge for unknown reason  and -Other possible source is aspiration PNA  with acute hypoxic hypercarbic respiratory failure with  recurrent cva / with hx afib  back on  on full ac Eliquis    ct head  repeat  Stable small to moderate subacute posterior left frontal infarct with mild associated petechial hemorrhage / ok to start blood thinner as per neuro danyelle.   hold peg feed  s/p acute  hypercarbia   on bipap   prognosis very poor  wife bed side  will have discussion with hospices  today  .

## 2024-06-01 NOTE — DISCHARGE NOTE FOR THE EXPIRED PATIENT - HOSPITAL COURSE
Patient was admitted for sepsis. CT A/P showed  Layering secretions in the lower trachea and bilateral proximal mainstem bronchi with scattered opacification of distal branching lobar and segmental airways bilaterally and with peribronchial wall thickening in lower lobes suggesting bronchitis. No lung consolidation. No source of infection identified in the abdomen or pelvis. CTH showedg yriform hyperdensity in the region of a left frontal parietal subacute infarct suggesting cortical laminar necrosis or hemorrhage. Small subacute high right frontal infarct. Subacute bilateral cerebellar infarcts. MRI head showed New posterior left corona radiata/frontal lobe infarct.  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 Petechial hemorrhage in the left parietal lobe. Bilateral areas of recent  microhemorrhages. Patient had multiple strokes and failed eliquis anticoagulation. Patient also had possible aspiration PNA w/ acute hypoxic hypercaric respiratory failure w/ bipap use. Patient was made comfort care on  as patient's prognosis was poor. Patient ultimatelly  from cardiopulmonary arrest on  at 1507.    Patient was admitted for sepsis. CT A/P showed  Layering secretions in the lower trachea and bilateral proximal mainstem bronchi with scattered opacification of distal branching lobar and segmental airways bilaterally and with peribronchial wall thickening in lower lobes suggesting bronchitis. No lung consolidation. No source of infection identified in the abdomen or pelvis. CTH showedg yriform hyperdense in the region of a left frontal parietal subacute infarct suggesting cortical laminar necrosis or hemorrhage. Small subacute high right frontal infarct. Subacute bilateral cerebellar infarcts. MRI head showed New posterior left corona radiata/frontal lobe infarct.  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 Petechial hemorrhage in the left parietal lobe. Bilateral areas of recent  micro hemorrhages. Patient had multiple strokes and failed Eliquis anticoagulation. Patient also had possible aspiration PNA w/ acute hypoxic hypercarbic respiratory failure w/ bipap use. Patient was made comfort care on  as patient's prognosis was poor. Patient ultimatelly  from cardiopulmonary arrest on  at 1507.

## 2024-06-01 NOTE — PROGRESS NOTE ADULT - PROBLEM SELECTOR PROBLEM 4
Palliative care encounter
T2DM (type 2 diabetes mellitus)
T2DM (type 2 diabetes mellitus)
Palliative care encounter
T2DM (type 2 diabetes mellitus)
Palliative care encounter
Palliative care encounter

## 2024-06-01 NOTE — PROGRESS NOTE ADULT - PROBLEM SELECTOR PROBLEM 8
On enteral nutrition

## 2024-06-01 NOTE — CHART NOTE - NSCHARTNOTEFT_GEN_A_CORE
Called by RN as patient appears to be no longer breathing. Patient seen and examined at bedside. Patient did not respond to verbal, physical, or painful stimuli. Absent heart and breath sounds on auscultation. Pupils are fixed and dilated, absent corneal reflex. No palpable pulses at radial, carotid, or femoral arteries.    Time of Death: 3:07  Dr. Solis (PMD) notified via phone.  Family notified at bedside.
per discussion with palliative RN, Dr. Solis and family, family would like Mr. Donohue to proceed with comfort care. orders to follow to reflect this
questions answered  bipap use discussed
Called by RN for multiple failed attempts to place an IV. Pt is on comfort measures and unable to receive medications given lack of access. During US assessment of bilateral upper and lower extremities for venous access, >10 superficial blood clots were appreciated throughout all extremities. US guided IV with successful placement in right calf. RN instructed to use this site, but will be unable to draw back given size of vein.

## 2024-06-01 NOTE — PROGRESS NOTE ADULT - PROBLEM SELECTOR PLAN 3
Cr baseline appears to be 1.3 per chart review   -Likely pre-renal 2/2 dec PO intake  - IVF stopped, comfort care  - tube feeds with free water held for BiPAP and asp PNA precaution Cr baseline appears to be 1.3 per chart review   -Likely pre-renal 2/2 dec PO intake  - IVF stopped peg feed stopped , comfort care

## 2024-06-01 NOTE — PROGRESS NOTE ADULT - PROBLEM SELECTOR PLAN 5
- Chronic, hold home amiodarone and metoprolol for comfort care  - Remote Tele d/c - Chronic, now comfort care   - Remote Tele d/c

## 2024-06-01 NOTE — PROGRESS NOTE ADULT - SUBJECTIVE AND OBJECTIVE BOX
Date/Time Patient Seen:  		  Referring MD:   Data Reviewed	       Patient is a 84y old  Male who presents with a chief complaint of AMS, CVA (31 May 2024 18:14)      Subjective/HPI     PAST MEDICAL & SURGICAL HISTORY:  Diabetes Mellitus, Type II    CAD (Coronary Artery Disease)  s/p 3v CABG 2004; stents placed in [place] in 2019    Dyslipidemia    Asymptomatic Peripheral Vascular Disease    Osteomyelitis    COPD (chronic obstructive pulmonary disease)  on 2L at home and BiPAP at night; intubated 6/18    Diabetes mellitus    Hypertension    PVD (peripheral vascular disease)    History of PAT (paroxysmal atrial tachycardia)    Asthma with COPD    BPH (benign prostatic hyperplasia)    Acute osteomyelitis    CABG (Coronary Artery Bypass Graft)  2004    Compound fracture  left leg    S/P primary angioplasty with coronary stent    H/O drainage of abscess  Left femur 12/2021          Medication list         MEDICATIONS  (STANDING):  budesonide 160 MICROgram(s)/formoterol 4.5 MICROgram(s) Inhaler 2 Puff(s) Inhalation two times a day  HYDROmorphone  Injectable 0.5 milliGRAM(s) IV Push every 4 hours  LORazepam   Injectable 0.5 milliGRAM(s) IV Push every 6 hours  tiotropium 2.5 MICROgram(s) Inhaler 2 Puff(s) Inhalation daily    MEDICATIONS  (PRN):  albuterol    0.083% 2.5 milliGRAM(s) Nebulizer every 2 hours PRN Shortness of Breath and/or Wheezing  bisacodyl Suppository 10 milliGRAM(s) Rectal daily PRN Constipation  glycopyrrolate Injectable 0.2 milliGRAM(s) IV Push every 6 hours PRN secretions  HYDROmorphone  Injectable 0.5 milliGRAM(s) IV Push every 2 hours PRN Severe Pain (7 - 10)  HYDROmorphone  Injectable 0.5 milliGRAM(s) IV Push every 2 hours PRN Dyspnea  HYDROmorphone  Injectable 0.2 milliGRAM(s) IV Push every 2 hours PRN Moderate Pain (4 - 6)  LORazepam   Injectable 0.5 milliGRAM(s) IV Push every 2 hours PRN Anxiety, agitation, refractory dyspnea         Vitals log        ICU Vital Signs Last 24 Hrs  T(C): 37.6 (01 Jun 2024 04:18), Max: 37.6 (01 Jun 2024 04:18)  T(F): 99.6 (01 Jun 2024 04:18), Max: 99.6 (01 Jun 2024 04:18)  HR: 106 (01 Jun 2024 04:18) (94 - 109)  BP: 119/67 (01 Jun 2024 04:18) (101/64 - 119/75)  BP(mean): --  ABP: --  ABP(mean): --  RR: 20 (01 Jun 2024 04:18) (18 - 20)  SpO2: 94% (01 Jun 2024 04:18) (92% - 98%)             Input and Output:  I&O's Detail    30 May 2024 07:01  -  31 May 2024 07:00  --------------------------------------------------------  IN:  Total IN: 0 mL    OUT:    Voided (mL): 450 mL  Total OUT: 450 mL    Total NET: -450 mL      31 May 2024 07:01  -  01 Jun 2024 05:32  --------------------------------------------------------  IN:  Total IN: 0 mL    OUT:    Voided (mL): 1200 mL  Total OUT: 1200 mL    Total NET: -1200 mL          Lab Data                        8.7    13.31 )-----------( 156      ( 31 May 2024 09:40 )             30.0     05-30    148<H>  |  108  |  54<H>  ----------------------------<  356<H>  4.2   |  42<H>  |  2.00<H>    Ca    9.1      30 May 2024 10:45  Phos  3.4     05-30  Mg     3.1     05-30    TPro  5.7<L>  /  Alb  1.9<L>  /  TBili  0.4  /  DBili  x   /  AST  48<H>  /  ALT  65  /  AlkPhos  108  05-30    ABG - ( 30 May 2024 13:08 )  pH, Arterial: 7.32  pH, Blood: x     /  pCO2: 81    /  pO2: 123   / HCO3: 42    / Base Excess: 15.6  /  SaO2: 100.0                   Review of Systems	      Objective     Physical Examination    heart s1s2  lung dc BS  head nc      Pertinent Lab findings & Imaging      Eliecer:  NO   Adequate UO     I&O's Detail    30 May 2024 07:01  -  31 May 2024 07:00  --------------------------------------------------------  IN:  Total IN: 0 mL    OUT:    Voided (mL): 450 mL  Total OUT: 450 mL    Total NET: -450 mL      31 May 2024 07:01  -  01 Jun 2024 05:32  --------------------------------------------------------  IN:  Total IN: 0 mL    OUT:    Voided (mL): 1200 mL  Total OUT: 1200 mL    Total NET: -1200 mL               Discussed with:     Cultures:	        Radiology

## 2024-06-01 NOTE — DISCHARGE NOTE FOR THE EXPIRED PATIENT - PRINCIPAL DIAGNOSIS
Cardiopulmonary arrest Composite Graft Text: The defect edges were debeveled with a #15 scalpel blade.  Given the location of the defect, shape of the defect, the proximity to free margins and the fact the defect was full thickness a composite graft was deemed most appropriate.  The defect was outline and then transferred to the donor site.  A full thickness graft was then excised from the donor site. The graft was then placed in the primary defect, oriented appropriately and then sutured into place.  The secondary defect was then repaired using a primary closure.

## 2024-06-01 NOTE — PROGRESS NOTE ADULT - PROBLEM SELECTOR PROBLEM 1
T2DM (type 2 diabetes mellitus)
T2DM (type 2 diabetes mellitus)
Sepsis

## 2024-06-01 NOTE — PROGRESS NOTE ADULT - REASON FOR ADMISSION
AMS, CVA

## 2024-06-01 NOTE — PROGRESS NOTE ADULT - PROBLEM SELECTOR PROBLEM 3
MERRILL (acute kidney injury)
ACP (advance care planning)
MERRILL (acute kidney injury)
MERRILL (acute kidney injury)
ACP (advance care planning)
MERRILL (acute kidney injury)
ACP (advance care planning)
MERRILL (acute kidney injury)
ACP (advance care planning)

## 2024-06-01 NOTE — PROGRESS NOTE ADULT - PROBLEM SELECTOR PLAN 9
Chronic  -On BiPAP as pt retaining pCO2  -PCO2 improved  -Montelukast DC'd  -S/p duonebs, dex  - Symbicort and Spiriva ordered  -Metolazone ordered via peg to control excess secretions Chronic  -On BiPAP as pt retaining pCO2  - off bipap   now comfort care

## 2024-06-01 NOTE — PROGRESS NOTE ADULT - PROBLEM SELECTOR PROBLEM 9
Chronic obstructive pulmonary disease

## 2024-06-01 NOTE — PROGRESS NOTE ADULT - PROBLEM SELECTOR PLAN 1
- Severe sepsis present on admission with T 100.8, , RR 26, Lactate 2.2 on admission  -CT AP: Layering secretions in the lower trachea and bilateral proximal mainstem bronchi with scattered opacification of distal branching lobar and segmental airways bilaterally and with peribronchial wall thickening in lower lobes suggesting bronchitis. No lung consolidation. No source of infection identified in the abdomen or pelvis.  - UCx, BCx both NGTD x72hrs  -Pts WBC remains elevated but has been afebrile throughout admission.  -Pt source of infection is likely aspiration PNA as pt with layering secretions in the trachei/BL mainsteam bronchi with other CT findings as above, and his declining mental status.  - ID Dr. العلي consulted, stopped abx for comfort care - Severe sepsis present on admission with T 100.8, , RR 26, Lactate 2.2 on admission    now on comfort care with over all prognosis poor / iv  hydromorphone  , glycopyrrolate   - as per wife request  no lab . no vitals

## 2024-06-01 NOTE — PROGRESS NOTE ADULT - PROBLEM SELECTOR PLAN 2
- New bilateral CVA's, hx Left M3 infarct  - Pt was out of window for thrombolytic therapy  - s/p Heparin gtt    - CT Head No Con: 1.  Gyriform hyperdensity in the region of a left frontal parietal subacute infarct suggesting cortical laminar necrosis or hemorrhage. 2.  Small subacute high right frontal infarct. Subacute bilateral cerebellar infarcts.  -MR head: New posterior left corona radiata/frontal lobe infarct.  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 Petechial hemorrhage in the left parietal lobe. Bilateral areas of recent  microhemorrhages.  -Repeat CT head 3/29: Degraded by motion. Stable small to moderate subacute posterior left frontal infarct with  mild associated petechial hemorrhage.  -Eliquis 2.5mg BID resumed on 5/29 per neurology recs as CT head is stable  - s/p Acetazolamide ordered for metabolic alkalosis  - Neuro check q4h  - C/w high-dose statin  - Dr. Underwood Neurology consulted - New bilateral CVA's, hx Left M3 infarct  - Pt was out of window for thrombolytic therapy  - s/p Heparin gtt   s/p Eliquis still cva   now comfort care

## 2024-06-01 NOTE — PROGRESS NOTE ADULT - PROBLEM SELECTOR PLAN 4
- Hx T2DM with hyperglycemia > 400   - holding insulin for comfort care  - Tube feeds held in the setting of aspiration PNA and BiPAP - Hx T2DM with hyperglycemia > 400   - Tube feeds held in the setting of aspiration PNA and BiPAP- now comfort care

## 2024-06-01 NOTE — PROGRESS NOTE ADULT - PROBLEM/PLAN-5
Patient ID: Mag is a 67 year old female.    Chief Complaint   Patient presents with   • Derm Problem     X 1 day. Small, itchy bump to vaginal area. Denies pain.      HPI     This visit is being performed virtually via Video visit using [employer].   Clinical Location: Home  Margoth's location Parked in car and is physically present in   the [facility] at the time of this visit.     Itchy bump in the perineal region. Located between vagina and rectum. Feels raised. Does not feel like an ulcer. Feels size of a small pimple. No other lesions. No pain or burning. Itchy but not terribly bothersome.     Has a h/o herpes outbreaks once every 5 years. A few years ago was on forehead but usually in the vaginal region.     Review of Systems   Constitutional: Negative.    Skin:        Skin lesion, as per HPI     Current Outpatient Medications   Medication Sig Dispense Refill   • ferrous fumarate 325 (106 Fe) MG tablet Take 1 tablet by mouth in the morning and 1 tablet at noon and 1 tablet in the evening. 90 tablet 0   • calcium-vitamin D-vitamin K 500-500-40 MG-UNT-MCG chewable tablet        No current facility-administered medications for this visit.     Problem List Items Addressed This Visit    None    Past Medical History:   Diagnosis Date   • Acute pain of right knee 7/24/2020   • Arthritis maybe 8 years ago?    right knee and left hip   • Bug bites 6/2/2021   • GERD (gastroesophageal reflux disease)     occasional   • No known problems    • Pes anserine bursitis 4/10/2019   • Viral syndrome 3/31/2020     Past Surgical History:   Procedure Laterality Date   • Cosmetic surgery      Blepharoplasty   • Laparoscopy,enterolysis     • Liposuction      Thighs   • Meniscectomy Right 2021   • Rotator cuff repair  2011   • Total hip replacement Right      Family History   Problem Relation Age of Onset   • Cancer, Colon Mother    • Asthma Mother    • Hearing Loss Mother    • Cancer, Colon Paternal Uncle    • Cancer, Lung 
Maternal Grandfather    • Osteoarthritis Father    • Hearing Loss Father    • Stroke Father      Social History     Socioeconomic History   • Marital status: /Civil Union     Spouse name: Not on file   • Number of children: Not on file   • Years of education: Not on file   • Highest education level: Not on file   Occupational History   • Not on file   Tobacco Use   • Smoking status: Never     Passive exposure: Never   • Smokeless tobacco: Never   Vaping Use   • Vaping Use: never used   Substance and Sexual Activity   • Alcohol use: Yes     Comment: very light drinker.   • Drug use: No   • Sexual activity: Yes     Partners: Male   Other Topics Concern   • Not on file   Social History Narrative   • Not on file     Social Determinants of Health     Financial Resource Strain: Not on file   Food Insecurity: Not on file   Transportation Needs: Not on file   Physical Activity: Not on file   Stress: Not on file   Social Connections: Not on file   Intimate Partner Violence: Not At Risk (9/26/2022)    Intimate Partner Violence    • Social Determinants: Intimate Partner Violence Past Fear: No    • Social Determinants: Intimate Partner Violence Current Fear: No       Patient's medications, allergies, past medical, surgical, social and family histories were reviewed and updated as appropriate.      Visit Vitals  Ht 5' 5.5\" (1.664 m)   Wt 79.4 kg (175 lb) Comment: Patient reported.   LMP  (LMP Unknown)   BMI 28.68 kg/m²       Physical Exam  Vitals reviewed.   Constitutional:       General: She is not in acute distress.     Appearance: Normal appearance. She is not ill-appearing, toxic-appearing or diaphoretic.   HENT:      Head: Normocephalic and atraumatic.   Pulmonary:      Effort: Pulmonary effort is normal. No respiratory distress.   Neurological:      General: No focal deficit present.      Mental Status: She is alert and oriented to person, place, and time.   Psychiatric:         Mood and Affect: Mood normal.         
Behavior: Behavior normal.         Thought Content: Thought content normal.         Judgment: Judgment normal.           Margoth was seen today for derm problem.    Diagnoses and all orders for this visit:    Skin lesion      Unable to examine today as she was in her car. Sounds like this could either be a pimple/ingrown hair vs herpes simplex recurrence vs beginning of herpes zoster (although only one lesion). She will take photos and send to me on portal in a few hours.     In meantime, if bothersome, can try some warm compresses and/or hydrocortisone for itching. She is not too bothered by it but rather would like to just know what it is.     Addendum to follow once photos are reviewed.     Medical compliance with plan discussed and risks of non-compliance reviewed.    Patient education completed on disease process, etiology & prognosis.    Patient expresses understanding of the plan.     Sarah Gabriel MD    
DISPLAY PLAN FREE TEXT

## 2024-06-01 NOTE — PROGRESS NOTE ADULT - SUBJECTIVE AND OBJECTIVE BOX
Patient is a 84y old  Male who presents with a chief complaint of AMS, CVA (01 Jun 2024 05:32)      INTERVAL HPI/OVERNIGHT EVENTS: Pt was seen and examined at bedside. US guided IV placed above R ankle ovn. During US assessment of bilateral upper and lower extremities for venous access, >10 superficial blood clots were appreciated throughout all extremities. Pt resting comfortably in bed. Non responsive to verbal or tactile stimuli.     MEDICATIONS  (STANDING):  budesonide 160 MICROgram(s)/formoterol 4.5 MICROgram(s) Inhaler 2 Puff(s) Inhalation two times a day  HYDROmorphone  Injectable 0.5 milliGRAM(s) IV Push every 4 hours  LORazepam   Injectable 0.5 milliGRAM(s) IV Push every 6 hours  tiotropium 2.5 MICROgram(s) Inhaler 2 Puff(s) Inhalation daily    MEDICATIONS  (PRN):  albuterol    0.083% 2.5 milliGRAM(s) Nebulizer every 2 hours PRN Shortness of Breath and/or Wheezing  bisacodyl Suppository 10 milliGRAM(s) Rectal daily PRN Constipation  glycopyrrolate Injectable 0.2 milliGRAM(s) IV Push every 6 hours PRN secretions  HYDROmorphone  Injectable 0.5 milliGRAM(s) IV Push every 2 hours PRN Severe Pain (7 - 10)  HYDROmorphone  Injectable 0.5 milliGRAM(s) IV Push every 2 hours PRN Dyspnea  HYDROmorphone  Injectable 0.2 milliGRAM(s) IV Push every 2 hours PRN Moderate Pain (4 - 6)  LORazepam   Injectable 0.5 milliGRAM(s) IV Push every 2 hours PRN Anxiety, agitation, refractory dyspnea      Allergies    No Known Allergies    Intolerances        REVIEW OF SYSTEMS:  CONSTITUTIONAL: No fever or chills  HEENT:  No headache, no sore throat  RESPIRATORY: No cough, wheezing, or shortness of breath  CARDIOVASCULAR: No chest pain, palpitations  GASTROINTESTINAL: No abd pain, nausea, vomiting, or diarrhea  GENITOURINARY: No dysuria, frequency, or hematuria  NEUROLOGICAL: no focal weakness or dizziness  MUSCULOSKELETAL: no myalgias     Vital Signs Last 24 Hrs  T(C): 37.6 (01 Jun 2024 04:18), Max: 37.6 (01 Jun 2024 04:18)  T(F): 99.6 (01 Jun 2024 04:18), Max: 99.6 (01 Jun 2024 04:18)  HR: 106 (01 Jun 2024 04:18) (94 - 109)  BP: 119/67 (01 Jun 2024 04:18) (101/64 - 119/75)  BP(mean): --  RR: 20 (01 Jun 2024 04:18) (18 - 20)  SpO2: 94% (01 Jun 2024 04:18) (92% - 98%)        PHYSICAL EXAM:  GENERAL: ill-appearing, elderly male, does not respond to painful or verbal stimuli  HEENT:  NC/AT, unable to assess EOM, anicteric, dry mucous membranes, +BiPAP in place  CHEST/LUNG:  Coarse breath sounds bilaterally.   HEART:  RRR, S1, S2 , no tachy   ABDOMEN:  BS+, soft, nontender, nondistended. PEG tube c/d/i  MUSCULOSKELETAL: no edema, cyanosis, or calf tenderness  NEUROLOGIC: AAOX0. Does not respond to commands for strength / neuro assessment.  : External catheter    LABS:                        8.7    13.31 )-----------( 156      ( 31 May 2024 09:40 )             30.0     CBC Full  -  ( 31 May 2024 09:40 )  WBC Count : 13.31 K/uL  Hemoglobin : 8.7 g/dL  Hematocrit : 30.0 %  Platelet Count - Automated : 156 K/uL  Mean Cell Volume : 94.6 fl  Mean Cell Hemoglobin : 27.4 pg  Mean Cell Hemoglobin Concentration : 29.0 gm/dL  Auto Neutrophil # : x  Auto Lymphocyte # : x  Auto Monocyte # : x  Auto Eosinophil # : x  Auto Basophil # : x  Auto Neutrophil % : x  Auto Lymphocyte % : x  Auto Monocyte % : x  Auto Eosinophil % : x  Auto Basophil % : x      Ca    9.1        30 May 2024 10:45        Urinalysis Basic - ( 30 May 2024 10:45 )    Color: x / Appearance: x / SG: x / pH: x  Gluc: 356 mg/dL / Ketone: x  / Bili: x / Urobili: x   Blood: x / Protein: x / Nitrite: x   Leuk Esterase: x / RBC: x / WBC x   Sq Epi: x / Non Sq Epi: x / Bacteria: x      CAPILLARY BLOOD GLUCOSE      POCT Blood Glucose.: 153 mg/dL (31 May 2024 21:26)  POCT Blood Glucose.: 145 mg/dL (31 May 2024 16:44)  POCT Blood Glucose.: 158 mg/dL (31 May 2024 16:10)  POCT Blood Glucose.: 149 mg/dL (31 May 2024 14:02)  POCT Blood Glucose.: 162 mg/dL (31 May 2024 11:57)  POCT Blood Glucose.: 189 mg/dL (31 May 2024 09:09)        Culture - Urine (collected 05-26-24 @ 17:49)  Source: Catheterized Catheterized  Final Report (05-27-24 @ 16:16):    No growth    Culture - Blood (collected 05-26-24 @ 17:00)  Source: .Blood Blood-Peripheral  Final Report (05-31-24 @ 22:00):    No growth at 5 days    Culture - Blood (collected 05-26-24 @ 16:45)  Source: .Blood Blood-Peripheral  Final Report (05-31-24 @ 22:00):    No growth at 5 days        RADIOLOGY & ADDITIONAL TESTS: ____    Personally reviewed.     Consultant(s) Notes Reviewed:  [x] YES  [ ] NO     Patient is a 84y old  Male who presents with a chief complaint of AMS, CVA (01 Jun 2024 05:32)      INTERVAL HPI/OVERNIGHT EVENTS: Pt was seen and examined at bedside. US guided IV placed above R ankle ovn. During US assessment of bilateral upper and lower extremities for venous access, >10 superficial blood clots were appreciated throughout all extremities. Pt resting comfortably in bed. Non responsive to verbal or tactile stimuli.   venti mask 94   MEDICATIONS  (STANDING):  budesonide 160 MICROgram(s)/formoterol 4.5 MICROgram(s) Inhaler 2 Puff(s) Inhalation two times a day  HYDROmorphone  Injectable 0.5 milliGRAM(s) IV Push every 4 hours  LORazepam   Injectable 0.5 milliGRAM(s) IV Push every 6 hours  tiotropium 2.5 MICROgram(s) Inhaler 2 Puff(s) Inhalation daily    MEDICATIONS  (PRN):  albuterol    0.083% 2.5 milliGRAM(s) Nebulizer every 2 hours PRN Shortness of Breath and/or Wheezing  bisacodyl Suppository 10 milliGRAM(s) Rectal daily PRN Constipation  glycopyrrolate Injectable 0.2 milliGRAM(s) IV Push every 6 hours PRN secretions  HYDROmorphone  Injectable 0.5 milliGRAM(s) IV Push every 2 hours PRN Severe Pain (7 - 10)  HYDROmorphone  Injectable 0.5 milliGRAM(s) IV Push every 2 hours PRN Dyspnea  HYDROmorphone  Injectable 0.2 milliGRAM(s) IV Push every 2 hours PRN Moderate Pain (4 - 6)  LORazepam   Injectable 0.5 milliGRAM(s) IV Push every 2 hours PRN Anxiety, agitation, refractory dyspnea          REVIEW OF SYSTEMS:  unable to obtain  sec  lethargic   Vital Signs Last 24 Hrs  T(C): 37.6 (01 Jun 2024 04:18), Max: 37.6 (01 Jun 2024 04:18)  T(F): 99.6 (01 Jun 2024 04:18), Max: 99.6 (01 Jun 2024 04:18)  HR: 106 (01 Jun 2024 04:18) (94 - 109)  BP: 119/67 (01 Jun 2024 04:18) (101/64 - 119/75)  BP(mean): --  RR: 20 (01 Jun 2024 04:18) (18 - 20)  SpO2: 94% (01 Jun 2024 04:18) (92% - 98%)        PHYSICAL EXAM:  GENERAL: ill-appearing,   HEENT:  NC/AT, unable to assess EOM, anicteric, dry mucous membranes,   CHEST/LUNG:  Coarse breath sounds bilaterally.   HEART:  RRR, S1, S2 , no tachy   ABDOMEN:  BS+, soft, nontender, nondistended. PEG tube c/d/i  MUSCULOSKELETAL: no edema, cyanosis, or calf tenderness  NEUROLOGIC: AAOX0. Does not respond to commands for strength / neuro assessment.  : External catheter    LABS:                        8.7    13.31 )-----------( 156      ( 31 May 2024 09:40 )             30.0     CBC Full  -  ( 31 May 2024 09:40 )  WBC Count : 13.31 K/uL  Hemoglobin : 8.7 g/dL  Hematocrit : 30.0 %  Platelet Count - Automated : 156 K/uL  Mean Cell Volume : 94.6 fl  Mean Cell Hemoglobin : 27.4 pg  Mean Cell Hemoglobin Concentration : 29.0 gm/dL  Auto Neutrophil # : x  Auto Lymphocyte # : x  Auto Monocyte # : x  Auto Eosinophil # : x  Auto Basophil # : x  Auto Neutrophil % : x  Auto Lymphocyte % : x  Auto Monocyte % : x  Auto Eosinophil % : x  Auto Basophil % : x      Ca    9.1        30 May 2024 10:45        Urinalysis Basic - ( 30 May 2024 10:45 )    Color: x / Appearance: x / SG: x / pH: x  Gluc: 356 mg/dL / Ketone: x  / Bili: x / Urobili: x   Blood: x / Protein: x / Nitrite: x   Leuk Esterase: x / RBC: x / WBC x   Sq Epi: x / Non Sq Epi: x / Bacteria: x      CAPILLARY BLOOD GLUCOSE      POCT Blood Glucose.: 153 mg/dL (31 May 2024 21:26)  POCT Blood Glucose.: 145 mg/dL (31 May 2024 16:44)  POCT Blood Glucose.: 158 mg/dL (31 May 2024 16:10)  POCT Blood Glucose.: 149 mg/dL (31 May 2024 14:02)  POCT Blood Glucose.: 162 mg/dL (31 May 2024 11:57)  POCT Blood Glucose.: 189 mg/dL (31 May 2024 09:09)        Culture - Urine (collected 05-26-24 @ 17:49)  Source: Catheterized Catheterized  Final Report (05-27-24 @ 16:16):    No growth    Culture - Blood (collected 05-26-24 @ 17:00)  Source: .Blood Blood-Peripheral  Final Report (05-31-24 @ 22:00):    No growth at 5 days    Culture - Blood (collected 05-26-24 @ 16:45)  Source: .Blood Blood-Peripheral  Final Report (05-31-24 @ 22:00):    No growth at 5 days

## 2024-06-04 NOTE — PATIENT PROFILE ADULT - BRADEN MOISTURE
Scheduled for colonoscopy on 07/17/2024.  She states will still be in [place].  Asking of 07/24/2024 is still available.    Scheduled at [place] 07/24/2024 arrive 2:30pm.  Will send prep instructions by [employer].   (3) occasionally moist

## 2024-06-24 NOTE — PATIENT PROFILE ADULT - PATIENT REPRESENTATIVE: ( YOU CAN CHOOSE ANY PERSON THAT CAN ASSIST YOU WITH YOUR HEALTH CARE PREFERENCES, DOES NOT HAVE TO BE A SPOUSE, IMMEDIATE FAMILY OR SIGNIFICANT OTHER/PARTNER)
Please use ibuprofen as needed and make an appointment to follow up with Your Primary Care Provider in 3-7 days if you have any concerns.    declines

## 2024-06-25 NOTE — DIETITIAN INITIAL EVALUATION ADULT - REASON
no overt s/s malnutrition Rinvoq Counseling: I discussed with the patient the risks of Rinvoq therapy including but not limited to upper respiratory tract infections, shingles, cold sores, bronchitis, nausea, cough, fever, acne, and headache. Live vaccines should be avoided.  This medication has been linked to serious infections; higher rate of mortality; malignancy and lymphoproliferative disorders; major adverse cardiovascular events; thrombosis; thrombocytopenia, anemia, and neutropenia; lipid elevations; liver enzyme elevations; and gastrointestinal perforations.

## 2024-07-01 ENCOUNTER — APPOINTMENT (OUTPATIENT)
Dept: VASCULAR SURGERY | Facility: CLINIC | Age: 84
End: 2024-07-01

## 2024-07-04 NOTE — ED ADULT NURSE NOTE - PRO INTERPRETER NEED 2
Reason for Disposition  • [1] Caller has URGENT medicine question about med that PCP or specialist prescribed AND [2] triager unable to answer question    Protocols used: Medication Question Call-ADULT-     English

## 2024-07-18 NOTE — ED ADULT NURSE NOTE - NEURO ASSESSMENT
72 hours.     Voice dictation software was used during the making of this note.  This software is not perfect and grammatical and other typographical errors may be present.  This note has not been completely proofread for errors.     Kathryn Graff MD  07/18/24 0145     WDL

## 2024-07-29 NOTE — H&P ADULT - PROBLEM/PLAN-6
Received call from patient. On 7/21/2024 he was at his Job at the [employer] in [place] when he started to experience headaches and symptoms that were prodromal for a seizure. 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     He went to the health room at the [employer] and his employer is now asking for an excuse or documentation that he in fact has Epilepsy.     Inés Esparza MD   
DISPLAY PLAN FREE TEXT

## 2024-08-06 NOTE — ED ADULT TRIAGE NOTE - PAIN: PRESENCE, MLM
Internal Medicine Hospitalist Progress Note    Reason for Admission/CC:   Fall, initial encounter [W19.XXXA]  Traumatic rhabdomyolysis, initial encounter (CMD) [T79.6XXA]  Closed fracture of twelfth thoracic vertebra with routine healing, unspecified fracture morphology, subsequent encounter [S22.549D]  Pressure injury of skin, unspecified injury stage, unspecified location [L89.90]    PCP:     SUBJECTIVE       No acute complaints.  Patient had a bowel movement earlier today.  Notes that she is urinating however still has some retention.  Discussed with nursing at bedside, still retaining around 400 mL on bladder scan      OBJECTIVE     Vital Last Value 24 Hour Range   Temperature 97.7 °F (36.5 °C) (08/06/24 1032) Temp  Min: 97.7 °F (36.5 °C)  Max: 99.7 °F (37.6 °C)   Pulse 90 (08/06/24 1032) Pulse  Min: 83  Max: 95   Respiratory 16 (08/06/24 1032) Resp  Min: 16  Max: 18   Non-Invasive  Blood Pressure 124/78 (08/06/24 1032) BP  Min: 112/73  Max: 130/75   Pulse Oximetry 95 % (08/06/24 1032) SpO2  Min: 93 %  Max: 97 %   Arterial   Blood Pressure   No data recorded        Intake/Output Summary (Last 24 hours) at 8/6/2024 1420  Last data filed at 8/6/2024 1345  Gross per 24 hour   Intake 120 ml   Output 1300 ml   Net -1180 ml       Physical Exam:  General: No acute distress  Head: Normocephalic, atraumatic  Eyes: PERRL, EOMI, anicteric sclera  Lungs: clear to auscultation bilaterally   Cardiac: regular rate and rhythm, no murmurs, rubs, or gallops  Abdomen: soft, non tender, non distended, bowel sounds presents  Extremities: 2+ pulses b/l UE/Les, no peripheral edema  Neurologic: no motor/sensory deficits, normal speech observed  Skin: no rashes/lesions noted  Psychiatric: proper mood/affect, cooperative       Labs:  Recent Results (from the past 24 hour(s))   Basic Metabolic Panel    Collection Time: 08/06/24  4:20 AM   Result Value Ref Range    Fasting Status      Sodium 137 135 - 145 mmol/L    Potassium 3.7 3.4 -  5.1 mmol/L    Chloride 104 97 - 110 mmol/L    Carbon Dioxide 27 21 - 32 mmol/L    Anion Gap 10 7 - 19 mmol/L    Glucose 119 (H) 70 - 99 mg/dL    BUN 28 (H) 6 - 20 mg/dL    Creatinine 0.58 0.51 - 0.95 mg/dL    Glomerular Filtration Rate 86 >=60    BUN/Cr 48 (H) 7 - 25    Calcium 8.1 (L) 8.4 - 10.2 mg/dL   CBC with Automated Differential (performable only)    Collection Time: 08/06/24  4:20 AM   Result Value Ref Range    WBC 9.8 4.2 - 11.0 K/mcL    RBC 3.27 (L) 4.00 - 5.20 mil/mcL    HGB 9.9 (L) 12.0 - 15.5 g/dL    HCT 31.0 (L) 36.0 - 46.5 %    MCV 94.8 78.0 - 100.0 fl    MCH 30.3 26.0 - 34.0 pg    MCHC 31.9 (L) 32.0 - 36.5 g/dL    RDW-CV 13.3 11.0 - 15.0 %    RDW-SD 46.1 39.0 - 50.0 fL     140 - 450 K/mcL    NRBC 0 <=0 /100 WBC    Neutrophil, Percent 63 %    Lymphocytes, Percent 15 %    Mono, Percent 14 %    Eosinophils, Percent 4 %    Basophils, Percent 0 %    Immature Granulocytes 4 %    Absolute Neutrophils 6.1 1.8 - 7.7 K/mcL    Absolute Lymphocytes 1.4 1.0 - 4.0 K/mcL    Absolute Monocytes 1.4 (H) 0.3 - 0.9 K/mcL    Absolute Eosinophils  0.4 0.0 - 0.5 K/mcL    Absolute Basophils 0.0 0.0 - 0.3 K/mcL    Absolute Immature Granulocytes 0.4 (H) 0.0 - 0.2 K/mcL     ?    DIAGNOSTIC STUDIES     Studies:  No results found.    Cardiac studies:   Encounter Date: 07/31/24   Electrocardiogram 12-Lead   Result Value    Ventricular Rate EKG/Min (BPM) 78    Atrial Rate (BPM) 78    IN-Interval (MSEC) 134    QRS-Interval (MSEC) 118    QT-Interval (MSEC) 424    QTc 483    P Axis (Degrees) 57    R Axis (Degrees) 33    T Axis (Degrees) 15    REPORT TEXT      Normal sinus rhythm  with sinus arrhythmia  Right bundle branch block  Abnormal ECG  No previous ECGs available  Confirmed by DOREEN AVILEZ MD ([id number]) on 7/31/2024 3:35:52 PM       TRANSTHORACIC ECHO (TTE) COMPLETE W/ W/O IMAGING AGENT  *[facility]*  [address] [place] [id number]  Phone [phone]  Fax [phone]  Transthoracic Echocardiogram (TTE)    Patient: Xi Corona     Study Date/Time:       Aug 1 2024 4:16AM  MRN:     [id number]             FIN#:                  [id number]  :     1935           Ht/Wt:                 167.6cm 71.2kg  Age:     88                   BSA/BMI:               1.83m^2 25.3kg/m^2  Gender:  F                    Baseline BP:           109 / 72  Ordering Physician:   Antonio Ferraro     Referring Physician:  Antonio Ferraro     Attending Physician:  Alise Walsh  Performing Physician: Johnnie Shah MD  Diagnostic Physician: Johnnie Shah MD  Sonographer:          Bharat Chan RDCS     Fellow:               Hugh Palmer     ------------------------------------------------------------------------------  INDICATIONS:   Fall.    ------------------------------------------------------------------------------  STUDY CONCLUSIONS  SUMMARY:    1. Left ventricle: The cavity size is mildly reduced. Wall thickness is mildly     increased. There is concentric hypertrophy. Systolic function is normal.     The average 2D speckle global longitudinal strain is normal. The global     longitudinal strain value is -25.1%. The ejection fraction was measured by     3D assessment. There is no evidence of elevated ventricular filling     pressure by Doppler parameters. The ejection fraction is 68%.  2. Mitral valve: Possible trivial prolapse, involving the anterior leaflet.     There is mild regurgitation.  3. Right ventricle: The cavity size is normal. Systolic function is normal.     Systolic pressure is severely increased. The estimated peak pressure is     64mm Hg.  4. Tricuspid valve: There is mild-moderate regurgitation.  5. Pulmonary arteries: Systolic pressure is severely increased.  6. Inferior vena cava: The IVC is dilated.  IMPRESSIONS:  There is no previous report available for comparison at this  time. Severe pulmonary  hypertension.    ------------------------------------------------------------------------------  STUDY DATA:   Procedure:  A transthoracic echocardiogram was performed. Image  quality was good.  M-mode, complete 2D, 3D, complete spectral Doppler, color  Doppler, and strain imaging.  Study status:  Routine.  Study completion:  There were no complications.    FINDINGS    BASELINE ECG:   Normal sinus rhythm.  LEFT VENTRICLE:  3D imaging and post-processing assessment performed. Well  visualized. The cavity size is mildly reduced. Wall thickness is mildly  increased. There is concentric hypertrophy. Systolic function is normal. The  average 2D speckle global longitudinal strain is normal. The global  longitudinal strain value is -25.1%. Wall motion is normal; there are no  regional wall motion abnormalities.    The ejection fraction was measured by  3D assessment. The ejection fraction is 68%. Left ventricular diastolic  function parameters are normal for the patient's age. There is no evidence of  elevated ventricular filling pressure by Doppler parameters.    AORTIC VALVE:  Well visualized. The annulus is mildly calcified. The valve is  trileaflet. The leaflets are mildly thickened and mildly calcified. Cusp  separation is normal. Mobility is not restricted. Velocity is within the  normal range. There is no stenosis. There is no significant regurgitation. The  peak systolic gradient is 8mm Hg. The ratio of LVOT to aortic valve peak  velocity is 0.99. The valve area is 2.8cm^2. The valve area index is  1.54cm^2/m^2.    AORTA:  Aortic root: The root is normal-sized and calcified.  Ascending aorta: The vessel is normal-sized and calcified.  Descending aorta: The vessel is not visualized.    MITRAL VALVE:  The annulus is mildly calcified. The leaflets are mildly  thickened and mildly calcified. Leaflet separation is normal. Mobility is not  restricted. Possible trivial prolapse, involving the anterior leaflet.  Inflow  velocity is within the normal range. There is mild regurgitation. The mean  diastolic gradient is 1mm Hg. The valve area by pressure half-time is 3.0cm^2.  The valve area index by pressure half-time is 1.64cm^2/m^2. The valve area  (LVOT continuity) is 2.5cm^2. The valve area index (LVOT continuity) is  1.38cm^2/m^2.    ATRIAL SEPTUM:  Well visualized. The septum is normal.  Color doppler shows no  obvious shunt.    LEFT ATRIUM:  Well visualized. The atrium is normal in size.    RIGHT VENTRICLE:  The cavity size is normal. Systolic function is normal. The  TAPSE is normal, suggestive of normal RV systolic function.  Systolic pressure  is severely increased. The estimated peak pressure is 64mm Hg.    VENTRICULAR SEPTUM:   Thickness is mildly increased. There is no diastolic  flattening and no systolic flattening.    PULMONIC VALVE:   Well visualized. The leaflets are normal thickness. Velocity  is within the normal range. There is no significant regurgitation. The peak  systolic gradient is 4mm Hg.    TRICUSPID VALVE:  Not well visualized. Leaflet separation is normal. There is  mild-moderate regurgitation.    PULMONARY ARTERIES:  The main pulmonary artery is normal-sized. Systolic pressure is severely  increased.    RIGHT ATRIUM:  Well visualized. The atrium is normal in size.       The  estimated central venous pressure is 8mm Hg.    PERICARDIUM:  The pericardium is normal in appearance.  There is no  pericardial effusion.    SYSTEMIC VEINS:  Inferior vena cava: The IVC is dilated.    ------------------------------------------------------------------------------  Measurements     Left ventricle           Value          Ref        Aortic valve             Value          Ref   VITALY, LAX chord       (L) 2.9   cm       3.8 - 5.2  Leaflet sep, MM          1.9   cm       ---------   ESD, LAX chord       (L) 2.1   cm       2.2 - 3.5  Peak v, S                1.4   m/sec    ---------   VITALY/bsa, LAX chord   (L) 1.6    cm/m^2   2.3 - 3.1  Peak grad, S             8     mm Hg    ---------   ESD/bsa, LAX chord   (L) 1.2   cm/m^2   1.3 - 2.1  LVOT/AV, Vpeak ratio     0.99           ---------   PW, ED, LAX          (H) 1.1   cm       0.6 - 0.9  NIHARIKA, Vmax                2.8   cm^2     ---------   IVS, ED              (H) 1.2   cm       0.6 - 0.9  NIHARIKA/bsa, Vmax            1.54  cm^2/m^2 ---------   PW, ED               (H) 1.1   cm       0.6 - 0.9   IVS/PW, ED               1.16           ---------  Mitral valve             Value          Ref   EDV                  (L) 23    ml       46 - 106   Mean v, D                0.51  m/sec    ---------   ESV                  (L) 10    ml       14 - 42    Peak E                   0.69  m/sec    ---------   EF                   (N) 68    %        54 - 74    Peak A                   0.97  m/sec    ---------   SV                       16    ml       ---------  VTI leaflet coapt        36.8  cm       ---------   EDV/bsa              (L) 13    ml/m^2   29 - 61    MiV/LVOT VTI             1.1            ---------   ESV/bsa              (L) 5     ml/m^2   8 - 24     Decel time               243   ms       ---------   SV/bsa                   9     ml/m^2   ---------  PHT                      73    ms       ---------   E', lat maggie, TDI     (L) 6.3   cm/sec   >=10.0     Mean grad, D             1     mm Hg    ---------   E/e', lat maggie, TDI   (N) 11             <=13       Peak E/A ratio           0.7            ---------   E', med maggie, TDI     (L) 6.2   cm/sec   >=7.0      A-VTI                    36.8  cm       ---------   E/e', med maggie, TDI       11             ---------  MVA, PHT                 3.0   cm^2     ---------   E', avg, TDI             6.255 cm/sec   ---------  MVA/bsa, PHT             1.64  cm^2/m^2 ---------   E/e', avg, TDI       (N) 11             <=14       MVA, LVOT cont           2.5   cm^2     ---------                                                      MVA/bsa, LVOT cont       1.38   cm^2/m^2 ---------   LVOT                     Value          Ref   Diam, S                  1.9   cm       ---------  Pulmonic valve           Value          Ref   Area                     2.8   cm^2     ---------  Peak v, S                1     m/sec    ---------   Peak edwin, S              1.42  m/sec    ---------  Peak grad, S             4     mm Hg    ---------   Peak grad, S             8     mm Hg    ---------                                                      Tricuspid valve          Value          Ref   Right ventricle          Value          Ref        TR peak v            (H) 3.7   m/sec    <=2.8   TAPSE, MM            (N) 1.8   cm       >=1.7      Peak RV-RA grad, S       56    mm Hg    ---------      Left atrium              Value          Ref        Aortic root              Value          Ref   AP dim, ES           (L) 2.6   cm       2.7 - 3.8  Root diam, ED        (N) 3.1   cm       2.5 - 4.0   AP dim index, ES     (L) 1.4   cm/m^2   1.5 - 2.3   Area ES, A4C         (N) 17    cm^2     <=20       Ascending aorta          Value          Ref   Area/bsa ES, A4C         9.11  cm^2/m^2 ---------  AAo AP diam, ED      (N) 3.0   cm       1.9 - 3.5   Area ES, A2C             15    cm^2     ---------  AAo AP diam/bsa, ED  (N) 1.6   cm/m^2   1.0 - 2.2   Area/bsa ES, A2C         8.08  cm^2/m^2 ---------   Vol, ES, 1-p A4C     (N) 41    ml       22 - 52    Pulmonary artery         Value          Ref   Vol/bsa, ES, 1-p A4C (N) 22    ml/m^2   11 - 40    Pressure, S              60    mm Hg    ---------   Vol, ES, 1-p A2C     (N) 39    ml       22 - 52   Vol/bsa, ES, 1-p A2C (N) 21    ml/m^2   13 - 40    Systemic veins           Value          Ref   Vol, ES, 2-p             43    ml       ---------  Estimated CVP            8     mm Hg    ---------   Vol/bsa, ES, 2-p     (N) 23    ml/m^2   16 - 34     Legend:  (L)  and  (H)  anushka values outside specified reference range.    (N)  marks values inside specified  reference range.    Prepared and electronically signed by  Johnnie Shah MD  08/01/2024 09:14    Prior Signatures:    Preliminary Reviewed by Hugh Palmer - 8/1/2024 8:43:07 AM         ASSESSMENT & PLAN     #Unwitnessed fall  -Likely secondary to mechanical as patient states she slept on her new mattress and hit her head on the table  -PEx bruises on Rt lip and face, Rt shoulder  -TSH, vitamin B12 folate and lipid panel within normal range  -TTE with ejection fraction of 70% with severe pulmonary hypertension and mild to moderate TR  - CT head unremarkable  Plan  -PT OT to evaluate; recommending RENE, accepted pending insurance     #Constipation -resolved  - added bowel regimen  - Patient reports BM yesterday       #Urinary retention  - Intermittent straight cath, still retaining 400 mL with bladder scan.  Likely secondary to immobility.  - Added 0.4mg tamsulosin  -Continue ambulation     #Rhabdomyolysis - resolved  - Likely secondary to prolonged time of immobility  - PEx notable for bruises on Rt shoulder, Rt lower face  -UA positive for myoglobinuria with large occult blood and minimal erythrocytes  - EKG: NSR. RBBB  - CXR/ CT H/CT cervical: unremarkable  - In the ED, given 0.9% NS 1L bolus  Plan  - S/p IVF; discontinued given normalized CK  - Tolerating PO well, renal function intact     #Troponemia - Peaked  -Likely demand in setting of prolonged immobilization  -Peaked at 115 down trended to 94, EKG without any signs of ST changes or T wave changes     #Acute T12 fracture  - Denies any symptoms  - CTA/P on admission: Suspected mildly distracted transverse fracture through the anterior upper T12 vertebral body. Otherwise, no other acute findings  -MRI thoracic spine wo contrast: Superior T12 vertebral body distraction fracture, as seen on the recent CT scan, with an associated posterior epidural hematoma leading to approximately 50% narrowing of the thecal sac.  Plan  -Pain control with tramadol  PRN  -Neurosurgery on board, TLSO brace to be placed no acute intervention  -PT OT recommending RENE  -Restart Lovenox  -Will need follow-up in 6 weeks with AP and lateral thoracic and lumbar spine x-rays ( with Dr. Fernandez)    #Severe pulmonary hypertension and mild to moderate TR  -Noted on Echo this admission on 08/1/24  -Likely will need to follow up with Pulmonology and PCP on dc     #Radiographic CAD  -Noted on CT chest abdomen pelvis without contrast     #Large hiatal hernia  -Noted on CT chest abdomen pelvis this admission     # Hypertension  - Continue PTA lisinopril 2.5 mg QD     # Hyperlipidemia  - On lovastatin 10 mg QD, replace with pravastatin 10 mg QD on admission        PPx-Lovenox  Diet-General  Full code    Marta Morales MD  Internal Medicine Hospitalist  [facility]    Available through [employer]     non-verbal indicators of pain/discomfort absent

## 2024-09-09 NOTE — OCCUPATIONAL THERAPY INITIAL EVALUATION ADULT - MANUAL MUSCLE TESTING RESULTS, REHAB EVAL
Right UE appears to be 0/5, however, pt unable to participate in formal assessment. Right UE at least 2/5 t/o, pt lethargic and falling asleep t/o assessment. Initiate Treatment: SPF Detail Level: Zone

## 2024-09-18 NOTE — PATIENT PROFILE ADULT - LOCATION #1
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

## 2024-10-11 NOTE — PATIENT PROFILE ADULT - PACKS PER DAY
Contacting your Doctor -   To contact a doctor, call [facility] or:  [phone] and ask for the resident on call for ENT-Otolaryngology (answered 24 hours a day)   Emergency Department:  [facility]: [phone]  [facility]: [phone] 911 if you are in need of immediate or emergent help   
0

## 2024-10-15 NOTE — PROGRESS NOTE PEDS - PROBLEM/PLAN-1
HPI:  Unknown age male, unknown past medical history (reported stroke and MI by coworkers) presented to [facility] with AMS, Pt was working at [employer] when he was found down by coworkers. EMS called and pt brought to [facility] ED. Intubated, sedated, started on cardene for SBPs in 200s. CT head showed brain stem bleed. Transferred to NSICU for further management.  (30 Sep 2024 12:55)    OVERNIGHT EVENTS: george, neuro stable +trach    Hospital Course:   9/30: transferred from [facility]. A line placed. Versed dc'd. Cy Rader at bedside, states pt has family in [place], cannot confirm medications or PMH other than stroke and MI. 250cc bolus 3% given. LR switched to NS. hydralazine 25q8 started, 3% started, switched propofol to precedex   10/1: stability CTH done. Added labetalol, started TF. Palliative consulted. ethics consulted to determine surrogate. febrile 103, pan cx sent  10/2: BD 2, GEORGE overnight. TF resumed. Desatt'd to 80s, FiO2 inc. to 50. Fentanyl given, ABG, CXR ordered. Maxxed on precedex, started on propofol for DARIEN -4 - -5. Precedex dc'd. Duonebs, mucomyst, hypertonic added. 3% dc'd. Cardene dc'd. Start vanc/CTX. Increased labetalol 200q8. MRSA negative, dc'd vanc. ETT pulled back 2cm x 2, good positioning after confirmatory chest xray. Ethics attempting to establish HCP with family. Na 159, starting FW 250q6 for range 150-155.   10/3: BD3, GEORGE o/n, neuro stable. Na elevating, FW increased to 300q6. Dc'd bowel reg for diarrhea. vEEG started. SQH 5000q8 tonight.   10/4: BD 4, albumn bolus, incr. LR to 80 2/2 incr. in Cr, LR to 100cc/hr for uptrending Cr. Started 7% hypersal for 48hrs and SL atropine for inline/oral thick secretions. Dc'd CTX and started ancef for MSSA in the sputum. Nephrology consulted for CKD, f/u recs. SBP 170s, given hydralazine 10mg IVP.   10/5: BD5, o/n 10mg IVP hydralazine given for SBP 170s and started on hydralazine 25q8 via OGT. 10mg IV push labetalol for SBP > 160s. RT placed for diarrhea.   10/6: BD6, o/n FW increased to 350q4 per nephrology recs. IV tylenol for temp 100.6, SBp 160s presumed uncomfortable.   10/7: BD7, overnight pancultured for temp 101.8F.   10/8: BD8. GEORGE. Cr bumped. decreased LR to 75cc/hr. Adding simethicone ATC. incr hydralazine 50mgTID. Incr labetalol 300mgTID. Na 145, decreased FWF to 250q6. Start precedex. FENa consistent with intrinsic kidney injury. Pend repeat renal US. Retaining up to 1.3L, bladder scans q6, straight cath PRN  10/9: BD 9. GEORGE overnight. Neuro stable. abd xray for distention w non-specific gas pattern, OGT to LIWS for morning. duonebs/mucomyst to q8 for improving secretions. Changed tube feeds to Jevity 1.5 20cc/hr, low rate due to abdominal distention, nepro dense and more difficult to digest. Tolerating CPAP, confirmed by ABG.   10/10: BD 10. GEORGE overnight. Neuro stable. (+) gabriel for urinary retention on bladder scan. inc TF to goal rate of 40cc/hr. family leaning toward pursuing trach/PEG. 1/2 amp for FS 81.   10/11: BD 11. GEORGE overnight. Neuro stable. Trach/PEG consults placed.   10/12: BD 12. GEORGE overnight. Neuro stable. MRI brain complete.   10/13: BD 13. Increase flomax. Hold SQH after PM dose for trach tm. IVL.   10/14: BD 14. GEORGE overnight, remains on AC/VC. Gabriel placed for urinary retention. Dc'd free water.  S/p trach with pulm. NGT placed and CXR confirmed in good position.   10/15: BD 15, GEORGE ovn. resumed feeds.     Vital Signs Last 24 Hrs  T(C): 36.6 (14 Oct 2024 22:03), Max: 37.1 (14 Oct 2024 05:23)  T(F): 97.8 (14 Oct 2024 22:03), Max: 98.8 (14 Oct 2024 05:23)  HR: 81 (15 Oct 2024 00:00) (71 - 89)  BP: --  BP(mean): --  RR: 18 (15 Oct 2024 00:00) (9 - 21)  SpO2: 98% (15 Oct 2024 00:00) (93% - 100%)    Parameters below as of 15 Oct 2024 00:00  Patient On (Oxygen Delivery Method): ventilator    O2 Concentration (%): 40    I&O's Summary    13 Oct 2024 07:01  -  14 Oct 2024 07:00  --------------------------------------------------------  IN: 2493.8 mL / OUT: 3860 mL / NET: -1366.2 mL    14 Oct 2024 07:01  -  15 Oct 2024 00:11  --------------------------------------------------------  IN: 952.4 mL / OUT: 1765 mL / NET: -812.6 mL        PHYSICAL EXAM:  General: patient seen laying supine in bed in NAD on ACVC  Neuro: not OE, +L corneal -R corneal, +cough/gag, b/l UE EP, b/l LE TF  HEENT: PERRL 2 mm  Neck: supple  Cardiac: RRR, S1S2  Pulmonary: chest rise symmetric  Abdomen: soft, nontender, nondistended  Ext: perfusing well  Skin: warm, dry      TUBES/LINES:  [x] Gabriel  [] Lumbar Drain  [] Wound Drains  [] Others      DIET:  [] NPO  [] Mechanical  [x] Tube feeds    LABS:                        11.3   11.07 )-----------( 318      ( 14 Oct 2024 05:22 )             34.5     10-14    134[L]  |  102  |  38[H]  ----------------------------<  111[H]  4.4   |  20[L]  |  3.10[H]    Ca    8.7      14 Oct 2024 05:22  Phos  4.2     10-14  Mg     1.8     10-14      PT/INR - ( 13 Oct 2024 05:30 )   PT: 11.4 sec;   INR: 0.99          PTT - ( 13 Oct 2024 05:30 )  PTT:30.9 sec  Urinalysis Basic - ( 14 Oct 2024 05:22 )    Color: x / Appearance: x / SG: x / pH: x  Gluc: 111 mg/dL / Ketone: x  / Bili: x / Urobili: x   Blood: x / Protein: x / Nitrite: x   Leuk Esterase: x / RBC: x / WBC x   Sq Epi: x / Non Sq Epi: x / Bacteria: x          CAPILLARY BLOOD GLUCOSE      POCT Blood Glucose.: 101 mg/dL (14 Oct 2024 22:59)  POCT Blood Glucose.: 107 mg/dL (14 Oct 2024 17:59)  POCT Blood Glucose.: 98 mg/dL (14 Oct 2024 11:07)  POCT Blood Glucose.: 99 mg/dL (14 Oct 2024 05:17)      Drug Levels: [] N/A    CSF Analysis: [] N/A      Allergies    Allergy Status Unknown    Intolerances      MEDICATIONS:  Antibiotics:    Neuro:  acetaminophen   Oral Liquid .. 650 milliGRAM(s) Oral every 6 hours PRN  oxyCODONE    IR 5 milliGRAM(s) Oral every 4 hours PRN    Anticoagulation:    OTHER:  acetylcysteine 10%  Inhalation 4 milliLiter(s) Inhalation every 12 hours  albuterol/ipratropium for Nebulization 3 milliLiter(s) Nebulizer every 12 hours  amLODIPine   Tablet 10 milliGRAM(s) Oral daily  chlorhexidine 2% Cloths 1 Application(s) Topical <User Schedule>  hydrALAZINE 50 milliGRAM(s) Oral every 8 hours  insulin lispro (ADMELOG) corrective regimen sliding scale   SubCutaneous every 6 hours  labetalol 300 milliGRAM(s) Enteral Tube every 8 hours  pantoprazole  Injectable 40 milliGRAM(s) IV Push daily  petrolatum Ophthalmic Ointment 1 Application(s) Both EYES two times a day  senna 2 Tablet(s) Oral at bedtime  simethicone 80 milliGRAM(s) Chew every 6 hours  tamsulosin 0.8 milliGRAM(s) Oral at bedtime    IVF:  calcium acetate 667 milliGRAM(s) Oral three times a day with meals    CULTURES:  Culture Results:   No growth at 24 hours (10-12 @ 18:14)  Culture Results:   No growth at 24 hours (10-12 @ 18:14)    RADIOLOGY & ADDITIONAL TESTS:      ASSESSMENT:  44 y/o PMH ?stroke/MI present to [facility] after collapsing at work. Decorticate posturing, vomiting, intubated for airway protection. Found to have brainstem hemorrhage (ICH score 3). Transferred to [facility] for further management. s/p trach 10/14.   AMS    Handoff    Acute myocardial infarction    CVA (cerebral vascular accident)    Intracerebral hemorrhage of brain stem    Brainstem stroke    Brain stem hemorrhage    Brain stem stroke syndrome    Hemorrhagic stroke    Brainstem stroke    Encephalopathy acute    Functional quadriplegia    Advanced care planning/counseling discussion    Encounter for palliative care    Percutaneous tracheal puncture    Altered mental status examination    [facility]    SysAdmin_VisitLink        PLAN:  Neuro:  - Neuro q4/vitals q1  - pain control: Tylenol prn   - CTH 9/30: enlarged pontine hemorrhage, CTH 10/3 done read stable   - vEEG (10/3-4)- negative  - stroke core measures, stroke neuro following  - MRI brain w/ w/o contrast 10/12: parenchymal hemorrhage, acute/subacute R cerebellar stroke      CV:  - SBP <160, levo prn  - HTN: amlodipine 10mg qd, labetalol 300mg q8h, hydralazine 50mg q8h  - echo (9/30) EF 75%    Resp:  - trach to vent, CPAP as tolerated  - duonebs/mucomyst q12    GI:  - TF via NGT   - bowel reg held for diarrhea, RT (10/5-10/12)   - GI ppx: Protonix while intubated  - Abd distension: Simethicone 80mg q6    Renal:  - IVL  - Hyperphosphatemia: phoslo TID  - Urinary retenion: Flomax 0.8mg at bedtime   - Gabriel (10/14- ) iso urinary retention   - CKD: trend Cr   - renal US 10/1: echogenicity c/w chronic med renal dz, repeat done 10/8: inc renal echogeicity, c/f medical renal disease w increased hydro     Endo:  - ISS, A1c 5.4    Heme:  - DVT ppx: SCDs, SQH 5000q8 held post trach     ID:  - Last pan cx 10/12  - MRSA swab negative, sputum MSSA + , s/p 1 dose vanc (10/2), CTX (10/2 - 10/4), Ancef (10/4-10/14)     Dispo: NSICU, full code, pending PT/OT    D/w Dr. D'Amico, Dr. Llanes    Assessment:  Present when checked    []  GCS  E   V  M     Heart Failure: []Acute, [] acute on chronic , []chronic  Heart Failure:  [] Diastolic (HFpEF), [] Systolic (HFrEF), []Combined (HFpEF and HFrEF), [] RHF, [] Pulm HTN, [] Other    [] ANIKA, [] ATN, [] AIN, [] other  [] CKD1, [] CKD2, [] CKD 3, [] CKD 4, [] CKD 5, []ESRD    Encephalopathy: [] Metabolic, [] Hepatic, [] toxic, [] Neurological, [] Other    Abnormal Nurtitional Status: [] malnurtition (see nutrition note), [ ]underweight: BMI < 19, [] morbid obesity: BMI >40, [] Cachexia    [] Sepsis  [] hypovolemic shock,[] cardiogenic shock, [] hemorrhagic shock, [] neuogenic shock  [] Acute Respiratory Failure  []Cerebral edema, [] Brain compression/ herniation,   [] Functional quadriplegia  [] Acute blood loss anemia   DISPLAY PLAN FREE TEXT

## 2024-10-23 NOTE — PROGRESS NOTE ADULT - PROBLEM SELECTOR PLAN 6
Time-based billing (NON-critical care) Resume home meds with hold parameters.  monitor renal function.

## 2024-11-02 NOTE — PROGRESS NOTE ADULT - ASSESSMENT
83-year-old M with history of COPD on home O2 PRN, coronary artery disease s/p CABG,  hypertension, diabetes, hyperlipidemia, atrial fibrillation on Eliquis as well as chronic osteomyelitis who presents to the emergency department at [facility] complaining of rapid heart rate. Cardiology consulted for afib with rvr.    Presenting with pAF with RVR  At home he had noted some SOB, his wife checked his HR and noted it up was up to 160s so brought him to the ER, now placed on Cardizem gtt, had missed home BB   EKGs showed ST, PVCs then AF with RVR and NSST, now back to NSR.     Trop peaked  507 now downtrending   Now that he is back in NSR recommended to start Amio for maintenance but family was hesitant   Continue Lopressor 50mg Q8H  Obtain TTE, TFTs.   Tele monitoring .    bp stable 120/71     Monitor and replete electrolytes. Keep K>4.0 and Mg>2.0.      83-year-old M with history of COPD on home O2 PRN, coronary artery disease s/p CABG,  hypertension, diabetes, hyperlipidemia, atrial fibrillation on Eliquis as well as chronic osteomyelitis who presents to the emergency department at [facility] complaining of rapid heart rate. Cardiology consulted for afib with rvr.    Presenting with pAF with RVR  At home he had noted some SOB, his wife checked his HR and noted it up was up to 160s so brought him to the ER, then  placed on Cardizem gtt, had missed home BB   EKGs showed ST, PVCs then AF with RVR and NSST, now back to NSR.   tele overnight SR 80-90s    Trop peaked  507 now downtrending   Now that he is back in NSR recommended to start Amio yesterday for maintenance but family was hesitant  Continue Lopressor 50mg Q8H  Obtain TTE, TFTs.   Tele monitoring     bp stable 120/71 on Lopressor every 8 hours     Follows with dr Tucker     Monitor and replete electrolytes. Keep K>4.0 and Mg>2.0.     IV discontinued, cath removed intact

## 2024-12-05 NOTE — ED ADULT NURSE NOTE - NS ED NURSE REPORT GIVEN TO FT
----- Message from Jose Roberto Odell MD sent at 12/5/2024  8:48 AM CST -----  Please call with benign results from 11/22 from the R [facility], c/w an intradermal nevus as suspected. NFT needed, fu as indicated.   Librado RAMOS

## 2024-12-05 NOTE — ED ADULT NURSE NOTE - DRUG PRE-SCREENING (DAST -1)
2024      Neeta Verde  [address]  [place] [address]      Dear Colleague,    Thank you for referring your patient, Neeta Verde, to the [facility]. Please see a copy of my visit note below.    No vitals were taken during visit as she was walked down from [facility].  [facility] 2024 2:52 PM        SUBJECTIVE:  Neeta Verde is a 55 year old female who is seen in consultation at the request of Gloria Vasquez for  left foot pain.  Mechanism of injury: her chocolate lab stepped on her foot and she twisted it.  Date of injury, if known: 2 days ago.    Treatment to this point:  ACE wrap.    Present symptoms: pain, and swelling. Pain lateral midfoot-cuboid region.  Pain worsening?: no  Pain with any weight bearing     Prior history of related problems: no prior problems with this area in the past.    Patients past medical, surgical, social and family histories reviewed.    Past Medical History:   Diagnosis Date     ASCUS favor benign 2015    Neg HPV     Depressive disorder      Moderate persistent asthma without complication      Uncomplicated asthma 20    Went to ER and followup with Allergy Doc      Past Surgical History:   Procedure Laterality Date     ZZC APPENDECTOMY        Family History:   Family History   Adopted: Yes   Problem Relation Age of Onset     Other - See Comments Son         chron's     Crohn's Disease Son      Allergy (Severe) Son      Unknown/Adopted No family hx of         Unknown family history      Asthma No family hx of      Social History:   Social History     Tobacco Use     Smoking status: Former     Current packs/day: 0.00     Types: Cigarettes     Start date: 1987     Quit date: 1989     Years since quittin.9     Smokeless tobacco: Never     Tobacco comments:     Social Use only   Substance Use Topics     Alcohol use: Yes     Alcohol/week: 14.0 standard drinks of alcohol       REVIEW OF SYSTEMS:  Review of  "Systems:  Constitutional:  NEGATIVE for fever, chills, change in weight  Integumentary/Skin:  NEGATIVE for worrisome rashes, moles or lesions  Eyes:  NEGATIVE for vision changes or irritation  ENT/Mouth:  NEGATIVE for ear, mouth and throat problems  Resp:  NEGATIVE for significant cough or SOB  Breast:  NEGATIVE for masses, tenderness or discharge  CV:  NEGATIVE for chest pain, palpitations or peripheral edema  GI:  NEGATIVE for nausea, abdominal pain, heartburn, or change in bowel habits  :  Negative   Musculoskeletal:  See HPI above  Neuro:  NEGATIVE for weakness, dizziness or paresthesias  Endocrine:  NEGATIVE for temperature intolerance, skin/hair changes  Heme/allergy/immune:  NEGATIVE for bleeding problems  Psychiatric:  NEGATIVE for changes in mood or affect    OBJECTIVE:  Physical Exam:  LMP 07/29/2015 (Exact Date)   General Appearance: healthy, alert and no distress   Skin: no suspicious lesions or rashes  Neuro: Normal strength and tone, mentation intact and speech normal  Vascular: good pulses, and capillary refill   Lymph: no lymphadenopathy   Psych:  mentation appears normal and affect normal/bright  Resp: no increased work of breathing     MUSCULOSKELETAL:  RIGHT FOOT :   Inspection: swelling over cuboid region    Tenderness:  over cuboid region  Mild redness no excessive warmth    CMSI     X-RAY INTERPRETATION   Xrays from today, of the left foot, reviewed with the patient today: lateral cuboid shows some calcification adjacent to the cortex, likely a small avulsion fracture based on the correlation with her exam. Xrays read as \"normal\" by radiology    ASSESSMENT/PLAN  Midfoot sprain likely subtle avulsion fracture of the cuboid.    Plan:   Fracture boot given.  Wear as needed for comfort, particularly with weight bearing.  I expect 4-6 week recovery  Can wean boot when able.  Can remove boot at rest  Ice, elevate, ACE as needed     Return to clinic 4-6 weeks or as needed     CAROLINE De Leon " Statement Selected MD  Dept. Orthopedic Surgery  [facility]       Again, thank you for allowing me to participate in the care of your patient.        Sincerely,        Domingo De Leon MD

## 2024-12-05 NOTE — ED ADULT NURSE NOTE - NS ED NURSE LEVEL OF CONSCIOUSNESS MENTAL STATUS
Case Management Assessment & Discharge Planning Note    Patient name Lona Cedeno  Location /-01 MRN [id number]  : 1947 Date 2024       Current Admission Date: 2024  Current Admission Diagnosis:Closed left hip fracture, initial encounter ([facility])   Patient Active Problem List    Diagnosis Date Noted Date Diagnosed    Closed left hip fracture, initial encounter ([facility]) 2024     Acute respiratory insufficiency 2024     Preoperative clearance 2024     SIRS (systemic inflammatory response syndrome) ([facility]) 2024     Acute encephalopathy 10/25/2024     Hyponatremia 10/24/2024     Open wound of right foot 2024     Hyperkalemia 2024     Acute metabolic encephalopathy 2024     Toxic encephalopathy 2024     Acute respiratory failure with hypoxia ([facility]) 2024     Sacral wound 2024     Other dietary vitamin B12 deficiency anemia 2024     Leukocytosis 2024     At risk for venous thromboembolism (VTE) 2024     At high risk for skin breakdown 2024     Wound of skin 2024     Impaired mobility and activities of daily living 2024     Anemia 2024     Constipation 2024     Age-related osteoporosis without current pathological fracture 2023     Abnormal CT of the chest 10/17/2023     COPD, severity to be determined ([facility]) 10/17/2023     Tobacco use disorder 10/17/2023     PAD (peripheral artery disease) ([facility]) 10/10/2023     Bladder neoplasm 2023     Left renal mass 2023     Hypomagnesemia 2023     Degenerative lumbar disc 2023     Urinary incontinence 2023     GERD without esophagitis 10/16/2019     Essential hypertension 02/15/2019     Anxiety and depression 02/15/2019     Type 2 diabetes mellitus with diabetic polyneuropathy, without long-term current use of insulin ([facility]) 2019       LOS (days): 1  Geometric Mean LOS (GMLOS) (days): 2.8  Days to GMLOS:1.9      OBJECTIVE:    Risk of Unplanned Readmission Score: 34.24     Current admission status: Inpatient    Preferred Pharmacy:   [id number] - [place] - [address]  [address]  [place] [address]  Phone: [phone] Fax: [phone]    [facility] [place] - [address]  [address] [place] [address]  Phone: [phone] Fax: [phone]    [facility] [place] - [address]  [address]  [place] [address]  Phone: [phone] Fax: [phone]    Primary Care Provider: Vivek Preston DO    Primary Insurance: [facility] REP  Secondary Insurance:     ASSESSMENT:  Active Health Care Proxies       Carmelo Cedeno [facility] Care Representative - Spouse   Primary Phone: [phone] (Home)                 Advance Directives  Does patient have a Health Care POA?: Yes  Does patient have Advance Directives?: Yes  Advance Directives: Living will  Primary Contact: Carmelo Cedeno spouse    Readmission Root Cause  30 Day Readmission: No    Patient Information  Admitted from:: Home  Mental Status: Alert  During Assessment patient was accompanied by: Not accompanied during assessment  Assessment information provided by:: Patient  Primary Caregiver: Self  Support Systems: Spouse/significant other  County [place] Residence: [place]  [place] [facility] do you live in?: [place]  Home entry access options. Select all that apply.: Stairs  Number of steps to enter home.: 3  Do the steps have railings?: Yes  Type of Current Residence: [facility]  Living Arrangements: Lives w/ Spouse/significant other  Is patient a ?: No    Activities of Daily Living Prior to Admission  Functional Status: Independent  Completes ADLs independently?: Yes  Ambulates independently?: Yes  Does patient use assisted devices?: Yes  Assisted Devices (DME) used: Straight Cane  Does patient currently own DME?: Yes  What DME does the patient currently own?: Straight  Cane, Walker, Crutches, Wheelchair, Home Oxygen concentrator, Portable Oxygen tanks (States only uses the oxygen at 2L PRN from [email or web address])  Does patient have a history of Outpatient Therapy (PT/OT)?: Yes ([place])  Does the patient have a history of Short-Term Rehab?: No  Does patient have a history of HHC?: Yes  Does patient currently have HHC?: No         Patient Information Continued  Income Source: Pension/MCC  Does patient have prescription coverage?: Yes ([employer])  Does patient have a history of substance abuse?: No  Does patient have a history of Mental Health Diagnosis?: No    Means of Transportation  Means of Transport to Appts:: Drives Self  DISCHARGE DETAILS:    Discharge planning discussed with:: Pt     Comments - Freedom of Choice: Pt has pending surgery to repair the hip, will need PT/OT evaluation post op     Contacts  Patient Contacts: Carmelo Cedeno spouse  Relationship to Patient:: Family  Contact Method: Phone  Phone Number: [phone]  Reason/Outcome: Emergency Contact  Pt has a hip fx after a fall at home.  Pt is scheduled to have surgery tomorrow.  Pt states she was completely IPA and drives.   Spouse does not drive d/t poor  eyesight.  Will continue to follow for care needs post op.                                                                                           Awake

## 2024-12-30 NOTE — PROVIDER CONTACT NOTE (CRITICAL VALUE NOTIFICATION) - DATE AND TIME:
LOV 8/13/24 for left knee pain, received Durolane injection at that visit.   Due to repeat anytime after 2/14/25.   Preauth placed to repeat Durolane left knee.   Will call patient to schedule when determination received by insurance.    17-May-2021 11:50

## 2025-01-03 NOTE — H&P ADULT - PROBLEM/PLAN-9
BETO Fajardo    Treatment:        Electronically signed by Tana Baez RN on 1/2/2025 at 8:37 PM    DISPLAY PLAN FREE TEXT

## 2025-01-14 NOTE — PROGRESS NOTE ADULT - ASSESSMENT
Yesi Perez   : 2016     To Whom it may Concern:    The above patient has Attention Deficit Hyperactivity Disorder and is recommended that she has a 504 plan to optimize her education potential         Sincerely,        Mary Eaton MD      cont rx   cont rx      [facility] P [phone]  1940 DOA 2020           REVIEW OF SYMPTOMS      Able to give ROS  Yes     RELIABLE No   CONSTITUTIONAL Weakness Yes  Chills No Vision changes No  ENDOCRINE No unexplained hair loss No heat or cold intolerance    ALLERGY No hives  Sore throat No   RESP Coughing blood no  Shortness of breath YES   NEURO No Headache  Confusion Pain neck No   CARDIAC No Chest pain No Palpitations   GI No Pain abdomen NO   Vomiting NO     PHYSICAL EXAM    HEENT Unremarkable PERRLA atraumatic   RESP Fair air entry EXP prolonged    Harsh breath sound Resp distres mild   CARDIAC S1 S2 No S3     NO JVD    ABDOMEN SOFT BS PRESENT NOT DISTENDED No hepatosplenomegaly PEDAL EDEMA present No calf tenderness  NO rash   GENERAL Not TOXIC looking    PT DATA/BEST PRACTICE  ALLERGY     shellfish                WT     2020 100                                 BMI       2020 30                                      ADVANCED DIRECTIVE     LINES TUBES POA.  PROCEDURES.     2020 Intubated                HEAD OF BED ELEVATION Yes      DVT PROPHYLAXIS.   lvnx 40 )      DYSPHAGIA EVAL .    DIET.. dash ()                                                                           IV F.  MCCORMICK PROPHYLAXIS.   INFECTION PPLX       chlorhexidine 2% ()                                                 OXYGENATION        VITALS/LABS       2020 afeb 68 170/70       PATIENT SUMMARY.   79 m former smoker PMH HTN, DM2 (on home Metformin and glipizide), COPD (2L home/ BIPAP at night), CAD with 3v CABG , Stent 2019 HLD, 10/26/2018 ECHO ef 55% hx hypercapnic resp failure with ho intubation c/b with cardiac arrest (2018) with ho recurrent admissions GOLD D admitted 2020 with progressive sob intubated in er Pulm consulted      COURSE  COPD rxed with bd steroids  INFECTION rxed with rocephin () azithro ()   STENOTROPHOMONAS COLONIZATION ? SPUTUM  stenotrophomonas   VENT Weaned off vent Intubated  Extubated    ABG 2020 6p cpap5/10/.3  736/53/94   MERRILL Cr increased to 1.6 on 2020   COVID Ruled out 2020   ECHO 2020 50% collapse of ivc dd1 n lvsf            PROBLEM/ASSESSMENT/RECOMMENDATIONS.         MERRILL Monitor cr  RESP FAILURE Monitor closely Is on  noct bpap   COPD ex Cont bd steroids Taper steroids   CAD On dapt bb  dc planning       TIME SPENT   Over 25 minutes aggregate care time spent on encounter; activities included   direct patient care, counseling and/or coordinating care reviewing notes, lab data/ imaging , discussion with multidisciplinary team/ patient  /family and explaining in detail risks, benefits, alternatives  of the recommendations     [facility] P [phone]  1940 DOA 2020

## 2025-02-10 NOTE — PROGRESS NOTE ADULT - SUBJECTIVE AND OBJECTIVE BOX
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 Blankenship who presents as transfer to ED for NSGY evaluation. He presented to OSH ED with suicidal ideation, was evaluated by psych there who deemed patient did not meet criteria for hold. Patient at this time states he would just like to go home, no further SI. He has 7/10 incisional pain, which he states is discretely different from his typical frontal headaches. Has not had his frontal HA since shunt placement. He notes no changes in vision, gait, or urination, no n/v. He does note some abdominal pain below abdominal incisions.    CTH showed trace IVH, stable ventricles. XRSS stable with tubing in continuity into abdomen and Hakim at 90.  CT Abdomen shows no pseudocyst.    - offered for patient to stay for pain control, but he opted to go home  - messaged Dr. Blankenship's clinic staff to move up post op follow up to this week instead of next week  - given small IVH counseled to continue to hold Eliquis until follow up this week. Small IVH almost certainly from time of surgery, no need for interval CT scan  - no current indication for surgical intervention, will consider shunt adjustments on outpatient basis    Discussed with Dr. Blankenship   [facility] -- Saud Aguilar [place], [place], Carroll Bass [place], [place]  Office # [phone]    Follow Up: CAD s/p CABG/stents, Cardiac Optimization    Subjective/Observations: Awake and alert, comfortable on RA.  c/o neck pain but denies leg pain at this point.  Denies chest pain or palpitations    REVIEW OF SYSTEMS: All other review of systems is negative unless indicated above  PAST MEDICAL & SURGICAL HISTORY:  Diabetes Mellitus, Type II    CAD (Coronary Artery Disease)  s/p 3v CABG 2004; stents placed in [place] in 2019    Dyslipidemia    Osteomyelitis    COPD (chronic obstructive pulmonary disease)  on 2L at home and BiPAP at night; intubated 6/18    Hypertension    PVD (peripheral vascular disease)    History of PAT (paroxysmal atrial tachycardia)    CABG (Coronary Artery Bypass Graft)  2004    Compound fracture  left leg    S/P primary angioplasty with coronary stent    MEDICATIONS  (STANDING):  aspirin enteric coated 81 milliGRAM(s) Oral daily  atorvastatin 80 milliGRAM(s) Oral at bedtime  azithromycin   Tablet 250 milliGRAM(s) Oral <User Schedule>  budesonide 160 MICROgram(s)/formoterol 4.5 MICROgram(s) Inhaler 2 Puff(s) Inhalation two times a day  cefepime   IVPB 2000 milliGRAM(s) IV Intermittent every 12 hours  dextrose 40% Gel 15 Gram(s) Oral once  dextrose 5%. 1000 milliLiter(s) (50 mL/Hr) IV Continuous <Continuous>  dextrose 5%. 1000 milliLiter(s) (100 mL/Hr) IV Continuous <Continuous>  dextrose 50% Injectable 25 Gram(s) IV Push once  dextrose 50% Injectable 12.5 Gram(s) IV Push once  dextrose 50% Injectable 25 Gram(s) IV Push once  glucagon  Injectable 1 milliGRAM(s) IntraMuscular once  insulin glargine Injectable (LANTUS) 7 Unit(s) SubCutaneous at bedtime  insulin lispro (ADMELOG) corrective regimen sliding scale   SubCutaneous three times a day before meals  insulin lispro (ADMELOG) corrective regimen sliding scale   SubCutaneous at bedtime  insulin lispro Injectable (ADMELOG) 2 Unit(s) SubCutaneous three times a day before meals  metoprolol tartrate 50 milliGRAM(s) Oral two times a day  predniSONE   Tablet 2 milliGRAM(s) Oral daily  tamsulosin 0.4 milliGRAM(s) Oral at bedtime  tiotropium 18 MICROgram(s) Capsule 1 Capsule(s) Inhalation daily    MEDICATIONS  (PRN):  acetaminophen     Tablet .. 650 milliGRAM(s) Oral every 6 hours PRN Mild Pain (1 - 3), Moderate Pain (4 - 6)  melatonin 5 milliGRAM(s) Oral at bedtime PRN Insomnia    Allergies    No Known Allergies    Intolerances    shellfish (Nausea)    Vital Signs Last 24 Hrs  T(C): 36.3 (25 Dec 2021 06:33), Max: 37 (24 Dec 2021 20:47)  T(F): 97.3 (25 Dec 2021 06:33), Max: 98.6 (24 Dec 2021 20:47)  HR: 81 (25 Dec 2021 06:33) (76 - 87)  BP: 108/66 (25 Dec 2021 06:33) (102/63 - 122/71)  BP(mean): --  RR: 18 (25 Dec 2021 06:33) (18 - 18)  SpO2: 97% (25 Dec 2021 06:33) (94% - 98%)  I&O's Summary    24 Dec 2021 07:01  -  25 Dec 2021 07:00  --------------------------------------------------------  IN: 170 mL / OUT: 700 mL / NET: -530 mL    PHYSICAL EXAM:  TELE: Not on tele  Constitutional: NAD, awake and alert, obese  HEENT: Moist Mucous Membranes, Anicteric  Pulmonary: Non-labored, breath sounds are clear but diminished bilaterally, No wheezing, rales or rhonchi  Cardiovascular: Regular, S1 and S2, No murmurs, rubs, gallops or clicks  Gastrointestinal: Bowel Sounds present, soft, nontender.   Lymph: No peripheral edema. No lymphadenopathy.  Skin: No visible rashes or ulcers.  Psych:  Mood & affect appropriate      LABS: All Labs Reviewed:                        11.9   6.46  )-----------( 348      ( 24 Dec 2021 09:26 )             38.7                         12.2   7.41  )-----------( 351      ( 23 Dec 2021 07:11 )             39.2     24 Dec 2021 09:26    140    |  105    |  25     ----------------------------<  234    3.8     |  28     |  1.30   23 Dec 2021 07:11    140    |  103    |  25     ----------------------------<  178    4.0     |  31     |  1.40     Ca    9.6        24 Dec 2021 09:26  Ca    10.0       23 Dec 2021 07:11    TPro  6.8    /  Alb  2.8    /  TBili  0.3    /  DBili  x      /  AST  9      /  ALT  15     /  AlkPhos  92     24 Dec 2021 09:26  TPro  6.7    /  Alb  2.7    /  TBili  0.4    /  DBili  x      /  AST  13     /  ALT  14     /  AlkPhos  91     23 Dec 2021 07:11     EXAM:  ECHO TTE WO CON COMP W DOPP         PROCEDURE DATE:  12/20/2021        INTERPRETATION:  INDICATION: Ischemic heart disease  sonographer KL    Blood Pressure 123/70    Height 180.3 cm     Weight 97.5 kg       BSA 2.2   sq m    Dimensions:  LA 3.0       Normal Values: 2.0 - 4.0 cm  Ao 3.5        Normal Values: 2.0 - 3.8 cm  SEPTUM 1.3       Normal Values: 0.6 - 1.2 cm  PWT 1.0       Normal Values: 0.6 - 1.1 cm  LVIDd 4.3         Normal Values: 3.0 - 5.6 cm  LVIDs 1.8         Normal Values: 1.8 - 4.0 cm      OBSERVATIONS:  Technically difficult and limited study  Mitral Valve: normal, trace physiologic MR.  Aortic Valve/Aorta: Sclerotic trileaflet aortic valve with normal opening.  Tricuspid Valve: Not well-visualized  Pulmonic Valve: Not well-visualized  Left Atrium: normal  Right Atrium: Not well-visualized  Left Ventricle: The left ventricular endocardium is not well-visualized.   Overall normal systolic function with an estimated LVEF of 55%. Cannot   evaluate for segmental abnormalities  Right Ventricle: Not well-visualized  Pericardium: no significant pericardial effusion.  Pulmonary/RV Pressure: estimated PA systolic pressure of 28 mmHg  LV diastolic dysfunction is present    IMPRESSION:  Technically difficult and limited study  The left ventricular endocardium is not well-visualized. Overall normal   systolic function with an estimated LVEF of 55-60%. Cannot evaluate for   segmental abnormalities  The right ventricle is not well-visualized  Sclerotic trileaflet aortic valve, without AI.  Trace physiologic MR  No significant pericardial effusion.    --- End of Report ---      ARISTIDES LEE MD; Attending Cardiologist  This document has been electronically signed. Dec 21 2021  1:38PM    ACC: [id number] EXAM:  XR CHEST AP OR PA 1V                          PROCEDURE DATE:  12/17/2021      INTERPRETATION:  Evaluate for pneumonia    AP chest. Prior 9/3/2021.    Median sternotomy. Normal heart mediastinum. Hyperinflated lungs. No   consolidation or effusion.    IMPRESSION: Hyperinflated lungs. No active infiltrates    --- End of Report ---    RODRIGO FAITH MD; Attending Radiologist  This document has been electronically signed. Dec 18 2021  9:39AM    Ventricular Rate 74 BPM    Atrial Rate 74 BPM    P-R Interval 148 ms    QRS Duration 86 ms    Q-T Interval 368 ms    QTC Calculation(Bazett) 408 ms    P Axis 65 degrees    R Axis 71 degrees    T Axis 62 degrees    Diagnosis Line Normal sinus rhythm with sinus arrhythmia  Confirmed by MICHAEL BASS ([id number]) on 12/20/2021 12:42:13 PM

## 2025-02-12 NOTE — PHYSICAL THERAPY INITIAL EVALUATION ADULT - LEVEL OF INDEPENDENCE: STAIR NEGOTIATION, REHAB EVAL
Contacted pt re: positive distress screen.  Pt is 56-year-old male being managed for head and neck cancer.  Pt known to oncology social work.  Reviewed distress screen dated 2025:     2025   Distress Tool Screening    Please select the number that describes how much distress you have experienced in the PAST WEEK: 8    Please select the number that describes how much distress you have experienced TODAY: 6    Work, concerns about job: 0 (None)    Finances, bills, insurance: 10 (Severe)    Housin (None)    Transportation: 0 (None)    Availability of caregivers: 0 (None)    Sadness/Depression: 0 (None)    Anxiety/Worry/Fear: 0 (None)    Concerns about appearance: 0 (None)    Connection to a higher power: 0 (None)    Sense of meaning and purpose: 0 (None)    Support from my spiritual community: 0 (None)    Nausea: 5    Eating/Loss of appetite: 6    Pain: 7    Fatigue: 0 (None)      Practical:  Pt discussed ongoing concerns about medical coverage.  He stated that he received two letters from [employer]:  one indicating he had been approved at 100% and one indicating that he had been denied.  Noted that pt's Medicaid application has also been submitted.  Message left for Cammy [employer] coordinator, re: above.    Emotional:  Pt expressed no significant concerns re: depression/anxiety.      Spiritual:  No immediate needs identified at this time.    Physical:  Pt being managed by palliative care at this time.    No additional needs identified at this time.  Reviewed role of oncology SW and encouraged pt to notify this provider if additional needs arise.    IBETH Harp, SHANEKAW-S  Oncology Social Worker   [phone]     supervision

## 2025-03-08 NOTE — ED ADULT NURSE NOTE - NS ED NURSE LEVEL OF CONSCIOUSNESS MENTAL STATUS
Onset: Last few days     Location / description: Pt states he is having left sided upper jaw pain / dental pain on that side. Was swollen a few days ago but the swelling has gone down. Pt states he broke his tooth about a week ago on that side. Called dentist but no appointments for 2 mo. States is bleeding at times. Cannot close his mouth all the way due to this. 2 back teeth on that side.     Precipitating Factors: Eating candy, Denies any injury     Pain Scale (1-10), 10 highest: 10/10    What  improves / worsens symptoms: Closing mouth makes his pain worse, Cold water makes his pain worse    Symptom specific medications: Has not taken any medications today     Recent visits (last 3-4 weeks) for same reason or recent surgery:  Has not had any surgeries, has not seen MD for these same symptoms recently     PLAN:    Go to the Emergency Department    [facility]  [facility] of [facility]  [address] [place], [place] [id number]    Patient/Caller agrees to follow recommendations.    Reason for Disposition   Patient sounds very sick or weak to the triager    Additional Information   Negative: Tooth pain (toothache) or swelling around a tooth     Pain is in both jaw and teeth   Negative: Jaw pain     Pain is in both jaw and teeth    Protocols used: Mouth Pain-A-AH    
Regardinyo,wi,m: teeth and jaw pain  10+/10 , cant close mouth  ----- Message from Marisol THOMAS sent at 3/8/2025  9:39 AM CST -----  Patient Name: Giorgio Ash    Specialist or PCP Name: call a nurse line     Symptoms: 32yo,wi,m: teeth and jaw pain  10+/10 , cant close mouth     Pregnant (females aged 13-60. If Yes, how long?) : n/a     Call Back # : [phone]    Which State are you currently located in?: [place]     Name of Clinic Site / Acct# : call nurse line     Call arrived during: After Hours    
Alert/Awake

## 2025-05-06 NOTE — PROGRESS NOTE ADULT - SUBJECTIVE AND OBJECTIVE BOX
Patient's hemorrhage is due to gastrointestinal bleed, this bleeding is associated with an anticoagulant, the anticoagulant is Select Anticoagulant(s): plavix, asa. Patients most recent Hgb, Hct, platelets, and INR are listed below.  Recent Labs     05/05/25  1615 05/05/25  1809 05/06/25  0208 05/06/25  0450 05/06/25  0805   HGB 6.5*   < > 7.4* 7.5* 7.5*   HCT 25.1*   < > 26.5* 26.5* 26.6*     --   --  124*  --     < > = values in this interval not displayed.     Plan  - Will trend hemoglobin/hematocrit Every 6 hours  - Will monitor and correct any coagulation defects  - Will transfuse if Hgb is <7g/dl (<8g/dl in cases of active ACS) or if patient has rapid bleeding leading to hemodynamic instability  -     [facility] -- Génesis Villavicencio Pannella, Patel, [facility]  Office # [phone]      Follow Up:    cardiac optimization   Subjective/Observations:   No events overnight resting comfortably in bed.  No complaints of chest pain, dyspnea, or palpitations reported. No signs of orthopnea or PND.  on room air     REVIEW OF SYSTEMS: All other review of systems is negative unless indicated above    PAST MEDICAL & SURGICAL HISTORY:  Diabetes Mellitus, Type II      CAD (Coronary Artery Disease)  s/p 3v CABG ; stents [place]       Dyslipidemia      Osteomyelitis      COPD (chronic obstructive pulmonary disease)  on 2L at home and BiPAP at night; intubated       Hypertension      PVD (peripheral vascular disease)      History of PAT (paroxysmal atrial tachycardia)      Asthma with COPD      BPH (benign prostatic hyperplasia)      Acute osteomyelitis      CABG (Coronary Artery Bypass Graft)        Compound fracture  left leg      S/P primary angioplasty with coronary stent      H/O drainage of abscess  Left femur 2021          MEDICATIONS  (STANDING):  ascorbic acid 500 milliGRAM(s) Oral daily  atorvastatin 20 milliGRAM(s) Oral at bedtime  budesonide 160 MICROgram(s)/formoterol 4.5 MICROgram(s) Inhaler 2 Puff(s) Inhalation two times a day  cefepime   IVPB      cefepime   IVPB 2000 milliGRAM(s) IV Intermittent every 12 hours  cholecalciferol 400 Unit(s) Oral daily  cyanocobalamin 1000 MICROGram(s) Oral daily  dextrose 5%. 1000 milliLiter(s) (50 mL/Hr) IV Continuous <Continuous>  dextrose 5%. 1000 milliLiter(s) (100 mL/Hr) IV Continuous <Continuous>  dextrose 50% Injectable 25 Gram(s) IV Push once  dextrose 50% Injectable 12.5 Gram(s) IV Push once  dextrose 50% Injectable 25 Gram(s) IV Push once  furosemide    Tablet 20 milliGRAM(s) Oral daily  glucagon  Injectable 1 milliGRAM(s) IntraMuscular once  insulin glargine Injectable (LANTUS) 15 Unit(s) SubCutaneous at bedtime  insulin lispro (ADMELOG) corrective regimen sliding scale   SubCutaneous at bedtime  insulin lispro (ADMELOG) corrective regimen sliding scale   SubCutaneous three times a day before meals  mepolizumab Subcutaneous Injectable 100 milliGRAM(s) SubCutaneous every 4 weeks  metoprolol tartrate 50 milliGRAM(s) Oral two times a day  montelukast 10 milliGRAM(s) Oral daily  multivitamin 1 Tablet(s) Oral daily  Nephro-carlos 1 Tablet(s) Oral daily  pantoprazole  Injectable 40 milliGRAM(s) IV Push every 12 hours  saccharomyces boulardii 250 milliGRAM(s) Oral two times a day  simethicone 80 milliGRAM(s) Chew every 6 hours  sucralfate 1 Gram(s) Oral four times a day  tamsulosin 0.4 milliGRAM(s) Oral at bedtime  tiotropium 2.5 MICROgram(s) Inhaler 2 Puff(s) Inhalation daily    MEDICATIONS  (PRN):  albuterol/ipratropium for Nebulization 3 milliLiter(s) Nebulizer every 6 hours PRN Shortness of Breath and/or Wheezing  dextrose Oral Gel 15 Gram(s) Oral once PRN Blood Glucose LESS THAN 70 milliGRAM(s)/deciliter      Allergies    [This allergen will not trigger allergy alert] IV DYE, IODINE CONTRAST (Unknown)  [This allergen will not trigger allergy alert] NKDA (Unknown)  latex (Unknown)    Intolerances    shellfish (Nausea)      Vital Signs Last 24 Hrs  T(C): 36.8 (15 Mar 2023 04:37), Max: 36.8 (15 Mar 2023 04:37)  T(F): 98.3 (15 Mar 2023 04:37), Max: 98.3 (15 Mar 2023 04:37)  HR: 95 (15 Mar 2023 04:37) (90 - 95)  BP: 100/61 (15 Mar 2023 04:37) (100/61 - 115/67)  BP(mean): --  RR: 18 (15 Mar 2023 04:37) (18 - 18)  SpO2: 92% (15 Mar 2023 04:37) (92% - 93%)    Parameters below as of 15 Mar 2023 04:37  Patient On (Oxygen Delivery Method): room air        I&O's Summary        PHYSICAL EXAM:  TELE: sr  Constitutional: NAD, awake and alert, obese  HEENT: Moist Mucous Membranes, Anicteric  Pulmonary: Non-labored, breath sounds are clear bilaterally, No wheezing, crackles or rhonchi  Cardiovascular: Regular, S1 and S2 nl, No murmurs, rubs, gallops or clicks  Gastrointestinal: Bowel Sounds present, soft, nontender.   Lymph: No lymphadenopathy. No peripheral edema.  Skin: No visible rashes or ulcers.  Psych:  Mood & affect appropriate    LABS: All Labs Reviewed:                        7.8    10.11 )-----------( 270      ( 14 Mar 2023 06:21 )             26.0                         7.7    7.99  )-----------( 228      ( 13 Mar 2023 07:30 )             24.7                         8.2    x     )-----------( x        ( 12 Mar 2023 16:14 )             26.8     14 Mar 2023 06:21    142    |  109    |  21     ----------------------------<  174    4.0     |  29     |  1.10   13 Mar 2023 07:30    140    |  109    |  22     ----------------------------<  153    3.8     |  27     |  1.00     Ca    8.9        14 Mar 2023 06:21  Ca    9.0        13 Mar 2023 07:30  Phos  2.0       14 Mar 2023 06:21  Mg     1.6       14 Mar 2023 06:21  Mg     1.8       13 Mar 2023 07:30               EC Lead ECG:   Ventricular Rate 93 BPM    Atrial Rate 93 BPM    P-R Interval 162 ms    QRS Duration 86 ms    Q-T Interval 354 ms    QTC Calculation(Bazett) 440 ms    P Axis 82 degrees    R Axis 83 degrees    T Axis 68 degrees    Diagnosis Line Normal sinus rhythm  Normal ECG  When compared with ECG of 2022 08:19,  No significant change was found  Confirmed by deep Heath ([id number]) on 3/7/2023 2:22:00 PM (23 @ 19:32)       EXAM:  ECHO TTE WO CON COMP W DOPP         PROCEDURE DATE:  2021        INTERPRETATION:  INDICATION: Ischemic heart disease  sonographer KL    Blood Pressure 123/70    Height 180.3 cm     Weight 97.5 kg       BSA 2.2   sq m    Dimensions:  LA 3.0       Normal Values: 2.0 - 4.0 cm  Ao 3.5        Normal Values: 2.0 - 3.8 cm  SEPTUM 1.3       Normal Values: 0.6 - 1.2 cm  PWT 1.0       Normal Values: 0.6 - 1.1 cm  LVIDd 4.3         Normal Values: 3.0 - 5.6 cm  LVIDs 1.8         Normal Values: 1.8 - 4.0 cm      OBSERVATIONS:  Technically difficult and limited study  Mitral Valve: normal, trace physiologic MR.  Aortic Valve/Aorta: Sclerotic trileaflet aortic valve with normal opening.  Tricuspid Valve: Not well-visualized  Pulmonic Valve: Not well-visualized  Left Atrium: normal  Right Atrium: Not well-visualized  Left Ventricle: The left ventricular endocardium is not well-visualized.   Overall normal systolic function with an estimated LVEF of 55%. Cannot   evaluate for segmental abnormalities  Right Ventricle: Not well-visualized  Pericardium: no significant pericardial effusion.  Pulmonary/RV Pressure: estimated PA systolic pressure of 28 mmHg  LV diastolic dysfunction is present        IMPRESSION:  Technically difficult and limited study  The left ventricular endocardium is not well-visualized. Overall normal   systolic function with an estimated LVEF of 55-60%. Cannot evaluate for   segmental abnormalities  The right ventricle is not well-visualized  Sclerotic trileaflet aortic valve, without AI.  Trace physiologic MR  No significant pericardial effusion.             [facility] -- Génesis Villavicencio Pannella, Patel, [facility]  Office # [phone]      Follow Up:    cardiac optimization   Subjective/Observations:   No events overnight resting comfortably in bed.  No complaints of chest pain, dyspnea, or palpitations reported. No signs of orthopnea or PND.  on room air laying flat   REVIEW OF SYSTEMS: All other review of systems is negative unless indicated above    PAST MEDICAL & SURGICAL HISTORY:  Diabetes Mellitus, Type II      CAD (Coronary Artery Disease)  s/p 3v CABG ; stents placed [place] in 2019      Dyslipidemia      Osteomyelitis      COPD (chronic obstructive pulmonary disease)  on 2L at home and BiPAP at night; intubated       Hypertension      PVD (peripheral vascular disease)      History of PAT (paroxysmal atrial tachycardia)      Asthma with COPD      BPH (benign prostatic hyperplasia)      Acute osteomyelitis      CABG (Coronary Artery Bypass Graft)        Compound fracture  left leg      S/P primary angioplasty with coronary stent      H/O drainage of abscess  Left femur 2021          MEDICATIONS  (STANDING):  ascorbic acid 500 milliGRAM(s) Oral daily  atorvastatin 20 milliGRAM(s) Oral at bedtime  budesonide 160 MICROgram(s)/formoterol 4.5 MICROgram(s) Inhaler 2 Puff(s) Inhalation two times a day  cefepime   IVPB      cefepime   IVPB 2000 milliGRAM(s) IV Intermittent every 12 hours  cholecalciferol 400 Unit(s) Oral daily  cyanocobalamin 1000 MICROGram(s) Oral daily  dextrose 5%. 1000 milliLiter(s) (50 mL/Hr) IV Continuous <Continuous>  dextrose 5%. 1000 milliLiter(s) (100 mL/Hr) IV Continuous <Continuous>  dextrose 50% Injectable 25 Gram(s) IV Push once  dextrose 50% Injectable 12.5 Gram(s) IV Push once  dextrose 50% Injectable 25 Gram(s) IV Push once  furosemide    Tablet 20 milliGRAM(s) Oral daily  glucagon  Injectable 1 milliGRAM(s) IntraMuscular once  insulin glargine Injectable (LANTUS) 15 Unit(s) SubCutaneous at bedtime  insulin lispro (ADMELOG) corrective regimen sliding scale   SubCutaneous at bedtime  insulin lispro (ADMELOG) corrective regimen sliding scale   SubCutaneous three times a day before meals  mepolizumab Subcutaneous Injectable 100 milliGRAM(s) SubCutaneous every 4 weeks  metoprolol tartrate 50 milliGRAM(s) Oral two times a day  montelukast 10 milliGRAM(s) Oral daily  multivitamin 1 Tablet(s) Oral daily  Nephro-carlos 1 Tablet(s) Oral daily  pantoprazole  Injectable 40 milliGRAM(s) IV Push every 12 hours  saccharomyces boulardii 250 milliGRAM(s) Oral two times a day  simethicone 80 milliGRAM(s) Chew every 6 hours  sucralfate 1 Gram(s) Oral four times a day  tamsulosin 0.4 milliGRAM(s) Oral at bedtime  tiotropium 2.5 MICROgram(s) Inhaler 2 Puff(s) Inhalation daily    MEDICATIONS  (PRN):  albuterol/ipratropium for Nebulization 3 milliLiter(s) Nebulizer every 6 hours PRN Shortness of Breath and/or Wheezing  dextrose Oral Gel 15 Gram(s) Oral once PRN Blood Glucose LESS THAN 70 milliGRAM(s)/deciliter      Allergies    [This allergen will not trigger allergy alert] IV DYE, IODINE CONTRAST (Unknown)  [This allergen will not trigger allergy alert] NKDA (Unknown)  latex (Unknown)    Intolerances    shellfish (Nausea)      Vital Signs Last 24 Hrs  T(C): 36.8 (15 Mar 2023 04:37), Max: 36.8 (15 Mar 2023 04:37)  T(F): 98.3 (15 Mar 2023 04:37), Max: 98.3 (15 Mar 2023 04:37)  HR: 95 (15 Mar 2023 04:37) (90 - 95)  BP: 100/61 (15 Mar 2023 04:37) (100/61 - 115/67)  BP(mean): --  RR: 18 (15 Mar 2023 04:37) (18 - 18)  SpO2: 92% (15 Mar 2023 04:37) (92% - 93%)    Parameters below as of 15 Mar 2023 04:37  Patient On (Oxygen Delivery Method): room air        I&O's Summary        PHYSICAL EXAM:  TELE: sr  Constitutional: NAD, awake and alert, obese  HEENT: Moist Mucous Membranes, Anicteric  Pulmonary: Non-labored, breath sounds are clear bilaterally, No wheezing, crackles or rhonchi  Cardiovascular: Regular, S1 and S2 nl, No murmurs, rubs, gallops or clicks  Gastrointestinal: Bowel Sounds present, soft, nontender.   Lymph: No lymphadenopathy. No peripheral edema.  Skin: No visible rashes or ulcers.  Psych:  Mood & affect appropriate    LABS: All Labs Reviewed:                        7.8    10.11 )-----------( 270      ( 14 Mar 2023 06:21 )             26.0                         7.7    7.99  )-----------( 228      ( 13 Mar 2023 07:30 )             24.7                         8.2    x     )-----------( x        ( 12 Mar 2023 16:14 )             26.8     14 Mar 2023 06:21    142    |  109    |  21     ----------------------------<  174    4.0     |  29     |  1.10   13 Mar 2023 07:30    140    |  109    |  22     ----------------------------<  153    3.8     |  27     |  1.00     Ca    8.9        14 Mar 2023 06:21  Ca    9.0        13 Mar 2023 07:30  Phos  2.0       14 Mar 2023 06:21  Mg     1.6       14 Mar 2023 06:21  Mg     1.8       13 Mar 2023 07:30               EC Lead ECG:   Ventricular Rate 93 BPM    Atrial Rate 93 BPM    P-R Interval 162 ms    QRS Duration 86 ms    Q-T Interval 354 ms    QTC Calculation(Bazett) 440 ms    P Axis 82 degrees    R Axis 83 degrees    T Axis 68 degrees    Diagnosis Line Normal sinus rhythm  Normal ECG  When compared with ECG of 2022 08:19,  No significant change was found  Confirmed by deep Heath ([id number]) on 3/7/2023 2:22:00 PM (23 @ 19:32)       EXAM:  ECHO TTE WO CON COMP W DOPP         PROCEDURE DATE:  2021        INTERPRETATION:  INDICATION: Ischemic heart disease  sonographer KL    Blood Pressure 123/70    Height 180.3 cm     Weight 97.5 kg       BSA 2.2   sq m    Dimensions:  LA 3.0       Normal Values: 2.0 - 4.0 cm  Ao 3.5        Normal Values: 2.0 - 3.8 cm  SEPTUM 1.3       Normal Values: 0.6 - 1.2 cm  PWT 1.0       Normal Values: 0.6 - 1.1 cm  LVIDd 4.3         Normal Values: 3.0 - 5.6 cm  LVIDs 1.8         Normal Values: 1.8 - 4.0 cm      OBSERVATIONS:  Technically difficult and limited study  Mitral Valve: normal, trace physiologic MR.  Aortic Valve/Aorta: Sclerotic trileaflet aortic valve with normal opening.  Tricuspid Valve: Not well-visualized  Pulmonic Valve: Not well-visualized  Left Atrium: normal  Right Atrium: Not well-visualized  Left Ventricle: The left ventricular endocardium is not well-visualized.   Overall normal systolic function with an estimated LVEF of 55%. Cannot   evaluate for segmental abnormalities  Right Ventricle: Not well-visualized  Pericardium: no significant pericardial effusion.  Pulmonary/RV Pressure: estimated PA systolic pressure of 28 mmHg  LV diastolic dysfunction is present        IMPRESSION:  Technically difficult and limited study  The left ventricular endocardium is not well-visualized. Overall normal   systolic function with an estimated LVEF of 55-60%. Cannot evaluate for   segmental abnormalities  The right ventricle is not well-visualized  Sclerotic trileaflet aortic valve, without AI.  Trace physiologic MR  No significant pericardial effusion.

## 2025-07-25 NOTE — ED PROVIDER NOTE - NEUROLOGICAL, MLM
oriented to person, place and time Alert and oriented, no focal deficits, no motor or sensory deficits.

## 2025-07-28 NOTE — ED ADULT NURSE NOTE - GASTROINTESTINAL WDL
Called patient and left a voicemail on 7/28/25.    Abdomen soft, nontender, nondistended, bowel sounds present in all 4 quadrants.

## (undated) DEVICE — CATH ELCTR GLIDE PRB 7FR

## (undated) DEVICE — ENDOCUFF VISION SZ 3 SM PRPL

## (undated) DEVICE — SENSOR O2 FINGER XL ADULT 24/BX 6BX/CA

## (undated) DEVICE — BRUSH COLONOSCOPY CYTOLOGY

## (undated) DEVICE — TRAP SPECIMEN SPUTUM 40CC

## (undated) DEVICE — TUBE O2 SUPL CRUSH RESIS CONN SOUTHSIDE ONLY

## (undated) DEVICE — MASK O2 NON REBREATH 3IN1 ADULT

## (undated) DEVICE — SET IV PUMP BLOOD 1VALVE 180FILTER NON-DEHP

## (undated) DEVICE — TUBING CANNULA SALTER LABS NASAL ADULT 7FT

## (undated) DEVICE — TUBING IV SET SECOND 34" W/O LOK-BLUNT

## (undated) DEVICE — Device

## (undated) DEVICE — SOL IRR POUR H2O 1000ML

## (undated) DEVICE — FORCEP RADIAL JAW 4 W NDL 2.4MM 2.8MM 240CM ORANGE DISP

## (undated) DEVICE — CANISTER SUCTION 1200CC 10/SL

## (undated) DEVICE — SYR IV POSIFLUSH NS 3ML 30/TY

## (undated) DEVICE — CATH IV SAFE BC 20G X 1.16" (PINK)

## (undated) DEVICE — TUBING SUCTION CONN 6FT STERILE

## (undated) DEVICE — CATH IV SAFE BC 22G X 1" (BLUE)

## (undated) DEVICE — SNARE POLYP SENS 27MM 240CM

## (undated) DEVICE — SNARE LRG

## (undated) DEVICE — SUCTION YANKAUER TAPERED BULBOUS NO VENT

## (undated) DEVICE — PACK IV START WITH CHG

## (undated) DEVICE — CLAMP BX HOT RAD JAW 3

## (undated) DEVICE — POLY TRAP ETRAP

## (undated) DEVICE — SOL IRR POUR H2O 500ML

## (undated) DEVICE — STERIS DEFENDO 3-PIECE KIT (AIR/WATER, SUCTION & BIOPSY VALVES)

## (undated) DEVICE — SYR LUER LOK 30CC

## (undated) DEVICE — TUBING IV SET SECONDARY 34"

## (undated) DEVICE — ELCTR GROUNDING PAD ADULT COVIDIEN

## (undated) DEVICE — RETRIEVER ROTH NET PLATINUM-UNIVERSAL

## (undated) DEVICE — BRUSH CYTO ENDO

## (undated) DEVICE — BITE BLOCK MAXI RUBBER STAMP

## (undated) DEVICE — TUBING IV SET GRAVITY 3Y 100" MACRO

## (undated) DEVICE — DRSG COMBINE 5X9"

## (undated) DEVICE — SYR ALLIANCE II INFLATION 60ML

## (undated) DEVICE — MASK LRG MED AND HIGH O2 CONC M TO M 10FT

## (undated) DEVICE — CATH ELECHMSTAT  INJ 7FR 210CM

## (undated) DEVICE — SOL IRR BAG H2O 1000ML

## (undated) DEVICE — SOL IRR NS 0.9% 250ML

## (undated) DEVICE — SYR LUER SLIP TIP 30CC

## (undated) DEVICE — VALVE BIOPSY

## (undated) DEVICE — TRAP QUICK CATCH  SINGL CHAMBER

## (undated) DEVICE — NDL INJ SCLERO INTERJECT 23G

## (undated) DEVICE — DRSG CURITY GAUZE SPONGE 4 X 4" 12-PLY

## (undated) DEVICE — SYR LUER SLIP TIP 50CC

## (undated) DEVICE — FORCEP RADIAL JAW 4 W NDL 2.2MM 2.8MM 160CM YELLOW DISP

## (undated) DEVICE — SUT HEWSON RETRIEVER

## (undated) DEVICE — FORMALIN CUPS 10% BUFFERED

## (undated) DEVICE — RADIAL JAW 4 LG CAPACITY WITH NDL

## (undated) DEVICE — ENDOCUFF VISION SZ 2 LG GRN

## (undated) DEVICE — MARKER ENDO SPOT EX

## (undated) DEVICE — TUBE RECTAL 24FR

## (undated) DEVICE — SOL IRR POUR NS 0.9% 1000ML

## (undated) DEVICE — FORCEP RADIAL JAW 4 JUMBO 2.8MM 3.2MM 240CM ORANGE DISP

## (undated) DEVICE — MASK OXYGEN PANORAMIC